# Patient Record
Sex: MALE | Race: WHITE | NOT HISPANIC OR LATINO | Employment: OTHER | URBAN - METROPOLITAN AREA
[De-identification: names, ages, dates, MRNs, and addresses within clinical notes are randomized per-mention and may not be internally consistent; named-entity substitution may affect disease eponyms.]

---

## 2017-01-03 ENCOUNTER — TELEPHONE (OUTPATIENT)
Dept: RADIOLOGY | Facility: HOSPITAL | Age: 62
End: 2017-01-03

## 2017-01-04 ENCOUNTER — HOSPITAL ENCOUNTER (OUTPATIENT)
Dept: RADIOLOGY | Facility: HOSPITAL | Age: 62
Discharge: HOME | End: 2017-01-04
Attending: INTERNAL MEDICINE | Admitting: INTERNAL MEDICINE
Payer: MEDICARE

## 2017-01-04 ENCOUNTER — OFFICE VISIT (OUTPATIENT)
Dept: PULMONOLOGY | Facility: CLINIC | Age: 62
End: 2017-01-04
Payer: MEDICARE

## 2017-01-04 VITALS
WEIGHT: 197.06 LBS | RESPIRATION RATE: 20 BRPM | OXYGEN SATURATION: 98 % | BODY MASS INDEX: 28.21 KG/M2 | SYSTOLIC BLOOD PRESSURE: 108 MMHG | DIASTOLIC BLOOD PRESSURE: 64 MMHG | HEART RATE: 88 BPM | HEIGHT: 70 IN

## 2017-01-04 DIAGNOSIS — R93.89 ABNORMAL CT OF THE CHEST: ICD-10-CM

## 2017-01-04 DIAGNOSIS — J15.0: Primary | ICD-10-CM

## 2017-01-04 DIAGNOSIS — F32.89 OTHER DEPRESSION: ICD-10-CM

## 2017-01-04 PROCEDURE — 71250 CT THORAX DX C-: CPT | Mod: 26,,, | Performed by: RADIOLOGY

## 2017-01-04 PROCEDURE — 99215 OFFICE O/P EST HI 40 MIN: CPT | Mod: PBBFAC,PO | Performed by: INTERNAL MEDICINE

## 2017-01-04 PROCEDURE — 99999 PR PBB SHADOW E&M-EST. PATIENT-LVL V: CPT | Mod: PBBFAC,,, | Performed by: INTERNAL MEDICINE

## 2017-01-04 PROCEDURE — 99215 OFFICE O/P EST HI 40 MIN: CPT | Mod: S$PBB,,, | Performed by: INTERNAL MEDICINE

## 2017-01-04 RX ORDER — PAROXETINE 10 MG/1
10 TABLET, FILM COATED ORAL EVERY MORNING
Qty: 30 TABLET | Refills: 11 | Status: SHIPPED | OUTPATIENT
Start: 2017-01-04 | End: 2017-03-01

## 2017-01-04 RX ORDER — PEN NEEDLE, DIABETIC 31 GX5/16"
NEEDLE, DISPOSABLE MISCELLANEOUS
COMMUNITY
Start: 2016-12-13 | End: 2019-01-14 | Stop reason: ALTCHOICE

## 2017-01-04 RX ORDER — TRAMADOL HYDROCHLORIDE 200 MG/1
TABLET, EXTENDED RELEASE ORAL
COMMUNITY
Start: 2016-12-15 | End: 2017-01-09 | Stop reason: ALTCHOICE

## 2017-01-04 NOTE — PATIENT INSTRUCTIONS
Paroxetine Hydrochloride Oral tablet  What is this medicine?  PAROXETINE (pa ROBI e teen) is used to treat depression. It may also be used to treat anxiety disorders, obsessive compulsive disorder, panic attacks, post traumatic stress, and premenstrual dysphoric disorder (PMDD).  This medicine may be used for other purposes; ask your health care provider or pharmacist if you have questions.  What should I tell my health care provider before I take this medicine?  They need to know if you have any of these conditions:  · bleeding disorders  · glaucoma  · heart disease  · kidney disease  · liver disease  · low levels of sodium in the blood  · kathrin or bipolar disorder  · seizures  · suicidal thoughts, plans, or attempt; a previous suicide attempt by you or a family member  · take MAOIs like Carbex, Eldepryl, Marplan, Nardil, and Parnate  · take medicines that treat or prevent blood clots  · an unusual or allergic reaction to paroxetine, other medicines, foods, dyes, or preservatives  · pregnant or trying to get pregnant  · breast-feeding  How should I use this medicine?  Take this medicine by mouth with a glass of water. Follow the directions on the prescription label. You can take it with or without food. Take your medicine at regular intervals. Do not take your medicine more often than directed. Do not stop taking this medicine suddenly except upon the advice of your doctor. Stopping this medicine too quickly may cause serious side effects or your condition may worsen.  A special MedGuide will be given to you by the pharmacist with each prescription and refill. Be sure to read this information carefully each time.  Talk to your pediatrician regarding the use of this medicine in children. Special care may be needed.  Overdosage: If you think you have taken too much of this medicine contact a poison control center or emergency room at once.  NOTE: This medicine is only for you. Do not share this medicine with  others.  What if I miss a dose?  If you miss a dose, take it as soon as you can. If it is almost time for your next dose, take only that dose. Do not take double or extra doses.  What may interact with this medicine?  Do not take this medicine with any of the following medications:  · linezolid  · MAOIs like Carbex, Eldepryl, Marplan, Nardil, and Parnate  · methylene blue (injected into a vein)  · pimozide  · thioridazine  This medicine may also interact with the following medications:  · alcohol  · aspirin and aspirin-like medicines  · atomoxetine  · certain medicines for depression, anxiety, or psychotic disturbances  · certain medicines for irregular heart beat like propafenone, flecainide, encainide, and quinidine  · certain medicines for migraine headache like almotriptan, eletriptan, frovatriptan, naratriptan, rizatriptan, sumatriptan, zolmitriptan  · cimetidine  · digoxin  · diuretics  · fentanyl  · fosamprenavir/ritonavir  · furazolidone  · isoniazid  · lithium  · medicines that treat or prevent blood clots like warfarin, enoxaparin, and dalteparin  · medicines for sleep  · metoprolol  · NSAIDs, medicines for pain and inflammation, like ibuprofen or naproxen  · phenobarbital  · phenytoin  · procarbazine  · procyclidine  · rasagiline  · supplements like Danny's wort, kava kava, valerian  · tamoxifen  · theophylline  · tramadol  · tryptophan  This list may not describe all possible interactions. Give your health care provider a list of all the medicines, herbs, non-prescription drugs, or dietary supplements you use. Also tell them if you smoke, drink alcohol, or use illegal drugs. Some items may interact with your medicine.  What should I watch for while using this medicine?  Tell your doctor if your symptoms do not get better or if they get worse. Visit your doctor or health care professional for regular checks on your progress. Because it may take several weeks to see the full effects of this medicine, it  is important to continue your treatment as prescribed by your doctor.  Patients and their families should watch out for new or worsening thoughts of suicide or depression. Also watch out for sudden changes in feelings such as feeling anxious, agitated, panicky, irritable, hostile, aggressive, impulsive, severely restless, overly excited and hyperactive, or not being able to sleep. If this happens, especially at the beginning of treatment or after a change in dose, call your health care professional.  You may get drowsy or dizzy. Do not drive, use machinery, or do anything that needs mental alertness until you know how this medicine affects you. Do not stand or sit up quickly, especially if you are an older patient. This reduces the risk of dizzy or fainting spells. Alcohol may interfere with the effect of this medicine. Avoid alcoholic drinks.  Your mouth may get dry. Chewing sugarless gum or sucking hard candy, and drinking plenty of water will help. Contact your doctor if the problem does not go away or is severe.  What side effects may I notice from receiving this medicine?  Side effects that you should report to your doctor or health care professional as soon as possible:  · allergic reactions like skin rash, itching or hives, swelling of the face, lips, or tongue  · black or bloody stools, blood in the urine or vomit  · fast, irregular heartbeat  · hallucination, loss of contact with reality  · painful or prolonged erection (men)  · seizures  · suicidal thoughts or other mood changes  · trouble passing urine or change in the amount of urine  · unusual bleeding or bruising  · unusually weak or tired  · vomiting  Side effects that usually do not require medical attention (report to your doctor or health care professional if they continue or are bothersome):  · change in appetite, weight  · change in sex drive or performance  · constipation or diarrhea  · difficulty sleeping  · drowsy  · headache  · increased  sweating  · muscle pain or weakness  · tremors  This list may not describe all possible side effects. Call your doctor for medical advice about side effects. You may report side effects to FDA at 7-017-HOI-0059.  Where should I keep my medicine?  Keep out of the reach of children.  Store at room temperature between 15 and 30 degrees C (59 and 86 degrees F). Keep container tightly closed. Throw away any unused medicine after the expiration date.  NOTE:This sheet is a summary. It may not cover all possible information. If you have questions about this medicine, talk to your doctor, pharmacist, or health care provider. Copyright© 2016 [employer]

## 2017-01-04 NOTE — MR AVS SNAPSHOT
[facility] - Pulmonary Services  [address] [address]  Phone: [phone]  Fax: [phone]                  Lavelle Ladd   2017 8:40 AM   Office Visit    Description:  Male : 1955   Provider:  Colin Garcia MD   Department:  [facility] - Pulmonary Services           Reason for Visit     Abnormal CT     Pneumonia           Diagnoses this Visit        Comments    Pneumonia of right upper lobe due to Klebsiella pneumoniae    -  Primary     Other depression                To Do List           Future Appointments        Provider Department Dept Phone    2017 8:00 AM RHEUMATOLOGY, INFUSION [facility] [phone]    2017 8:10 AM LAB, TRANSPLANT [facility] [phone]    2017 11:00 AM Radha Pereyra MD [facility] [phone]    2017 9:15 AM LABORATORY, [facility] [phone]    2017 8:45 AM LABORATORY, [facility] [phone]      Goals (5 Years of Data)     None      Follow-Up and Disposition     Return in about 2 years (around 2019) for review of CT scan.       These Medications        Disp Refills Start End    paroxetine (PAXIL) 10 MG tablet 30 tablet 11 2017    Take 1 tablet (10 mg total) by mouth every morning. - Oral    Pharmacy: [id number] - [place] - [address] [facility] Ph #: [phone]         [facility]     [facility] -  Assistance  Registered nurses in the [facility] provide clinical advisement, health education, appointment booking, and other advisory services.  Call for this free service at [phone].             Medications           Message regarding Medications     Verify the changes and/or additions to your medication regime listed below are the same as discussed with your clinician today.  If any of these changes or additions are incorrect, please notify  "your healthcare provider.        START taking these NEW medications        Refills    paroxetine (PAXIL) 10 MG tablet 11    Sig: Take 1 tablet (10 mg total) by mouth every morning.    Class: Normal    Route: Oral           Verify that the below list of medications is an accurate representation of the medications you are currently taking.  If none reported, the list may be blank. If incorrect, please contact your healthcare provider. Carry this list with you in case of emergency.           Current Medications     albuterol-ipratropium 2.5mg-0.5mg/3mL (DUO-NEB) 0.5 mg-3 mg(2.5 mg base)/3 mL nebulizer solution Take 3 mLs by nebulization every 6 (six) hours.    aspirin (ECOTRIN) 81 MG EC tablet Take 1 tablet (81 mg total) by mouth once daily.    atorvastatin (LIPITOR) 10 MG tablet Take 1 tablet (10 mg total) by mouth once daily. While on Harvoni.    atovaquone (MEPRON) 750 mg/5 mL Susp Take 10 mLs (1,500 mg total) by mouth once daily.    BD INSULIN PEN NEEDLE UF SHORT 31 gauge x 5/16" Ndle     blood sugar diagnostic Strp 1 each by Misc.(Non-Drug; Combo Route) route 3 (three) times daily.    blood sugar diagnostic Strp 1 each by Misc.(Non-Drug; Combo Route) route 4 (four) times daily.    blood-glucose meter kit Use as instructed    budesonide-formoterol 160-4.5 mcg (SYMBICORT) 160-4.5 mcg/actuation HFAA Inhale 2 puffs into the lungs every 12 (twelve) hours. Wash out mouth after using    ergocalciferol (ERGOCALCIFEROL) 50,000 unit Cap Take 1 capsule (50,000 Units total) by mouth every 7 days. Take on Mondays    ERTAPENEM SODIUM (ERTAPENEM, INVANZ, 1 G/100 ML NS, READY TO MIX,) Inject 100 mLs (1 g total) into the vein once daily.    famotidine (PEPCID) 20 MG tablet Take 1 tablet (20 mg total) by mouth every evening.    inhalation device (BREATHERITE VALVED MDI CHAMBER) Use as directed for inhalation.    insulin NPH (NOVOLIN N) 100 unit/mL injection Take 20 units subq with breakfast and 8 units at bedtime    insulin regular " "100 unit/mL Inj injection Take 10 units subq with breakfast and 8 units with dinner    k phos di & mono-sod phos mono (K-PHOS-NEUTRAL) 250 mg Tab Take 2 tablets by mouth 3 (three) times daily.    lancets Misc 1 each by Misc.(Non-Drug; Combo Route) route 3 (three) times daily.    lancets Misc 1 each by Misc.(Non-Drug; Combo Route) route 4 (four) times daily.    ledipasvir-sofosbuvir (HARVONI)  mg Tab Take 1 tablet by mouth once daily.    magnesium oxide (MAG-OX) 400 mg tablet Take 1 tablet (400 mg total) by mouth 2 (two) times daily.    metoprolol tartrate (LOPRESSOR) 50 MG tablet Take 0.5 tablets (25 mg total) by mouth 2 (two) times daily.    multivitamin (THERAGRAN) tablet Take 1 tablet by mouth once daily.    nystatin (MYCOSTATIN) 100,000 unit/mL suspension Take 5 mLs (500,000 Units total) by mouth 3 (three) times daily after meals.    olanzapine (ZYPREXA) 5 MG tablet Take 1 tablet (5 mg total) by mouth every evening.    ondansetron (ZOFRAN-ODT) 4 MG TbDL Take 2 tablets (8 mg total) by mouth every 8 (eight) hours as needed (nausea).    pen needle, diabetic (EASY COMFORT PEN NEEDLES) 32 gauge x 5/32" Ndle Inject 1 each into the skin 3 (three) times daily.    pen needle, diabetic 31 gauge x 1/4" Ndle 1 each by Misc.(Non-Drug; Combo Route) route 4 (four) times daily.    predniSONE (DELTASONE) 10 MG tablet 20 mg PO QD 12/3-1/2; 15 mg PO QD 1/3-2/2; 10 mg PO QD 2/3-3/2; 5 mg thereafter    ropinirole (REQUIP) 1 MG tablet Take 1 tablet (1 mg total) by mouth every evening.    sodium bicarbonate 650 MG tablet Take 4 tablets BID  four hours before or after Harvoni    tacrolimus (PROGRAF) 0.5 MG Cap Take 8 capsules (4 mg total) by mouth every 12 (twelve) hours. Z94.0 Kidney Transplant    tramadol (ULTRAM-ER) 200 MG Tb24     oxycodone (ROXICODONE) 15 MG Tab Take 1 tablet (15 mg total) by mouth every 6 (six) hours as needed for Pain.    paroxetine (PAXIL) 10 MG tablet Take 1 tablet (10 mg total) by mouth every " "morning.           Clinical Reference Information           Vital Signs - Last Recorded  Most recent update: 1/4/2017  8:29 AM by Lynne Salvador LPN    BP Pulse Resp Ht Wt SpO2    108/64 88 20 5' 10" (1.778 m) 89.4 kg (197 lb 1.5 oz) 98%    BMI                28.28 kg/m2          Blood Pressure          Most Recent Value    BP  108/64      Allergies as of 1/4/2017     Tylenol [Acetaminophen]      Immunizations Administered on Date of Encounter - 1/4/2017     None      Orders Placed During Today's Visit     Future Labs/Procedures Expected by Expires    CT Chest Without Contrast  1/4/2017 1/4/2018      Maintenance Dialysis History     Start End Type Comments Center    10/30/2013 5/21/2016 Hemo  [facility]            Current Dialysis Center Information     No center information to display.            Transplant Information        Txp Date Organ Coordinator Care Team    5/21/2016 Kidney Ros[facility] Newton RN Referring Physician:  Avel Estrada MD   Current Nephrologist:  Avel Estrada MD   Surgeon:  Ronny Bergeron MD   Assisting Surgeon:  Terrell Navarrete MD   Resident:  Jose David Rowley MD         Instructions    Paroxetine Hydrochloride Oral tablet  What is this medicine?  PAROXETINE (pa ROBI e teen) is used to treat depression. It may also be used to treat anxiety disorders, obsessive compulsive disorder, panic attacks, post traumatic stress, and premenstrual dysphoric disorder (PMDD).  This medicine may be used for other purposes; ask your health care provider or pharmacist if you have questions.  What should I tell my health care provider before I take this medicine?  They need to know if you have any of these conditions:  · bleeding disorders  · glaucoma  · heart disease  · kidney disease  · liver disease  · low levels of sodium in the blood  · kathrin or bipolar disorder  · seizures  · suicidal thoughts, plans, or attempt; a previous suicide attempt by you or a family member  · take MAOIs like " Carbex, Eldepryl, Marplan, Nardil, and Parnate  · take medicines that treat or prevent blood clots  · an unusual or allergic reaction to paroxetine, other medicines, foods, dyes, or preservatives  · pregnant or trying to get pregnant  · breast-feeding  How should I use this medicine?  Take this medicine by mouth with a glass of water. Follow the directions on the prescription label. You can take it with or without food. Take your medicine at regular intervals. Do not take your medicine more often than directed. Do not stop taking this medicine suddenly except upon the advice of your doctor. Stopping this medicine too quickly may cause serious side effects or your condition may worsen.  A special MedGuide will be given to you by the pharmacist with each prescription and refill. Be sure to read this information carefully each time.  Talk to your pediatrician regarding the use of this medicine in children. Special care may be needed.  Overdosage: If you think you have taken too much of this medicine contact a poison control center or emergency room at once.  NOTE: This medicine is only for you. Do not share this medicine with others.  What if I miss a dose?  If you miss a dose, take it as soon as you can. If it is almost time for your next dose, take only that dose. Do not take double or extra doses.  What may interact with this medicine?  Do not take this medicine with any of the following medications:  · linezolid  · MAOIs like Carbex, Eldepryl, Marplan, Nardil, and Parnate  · methylene blue (injected into a vein)  · pimozide  · thioridazine  This medicine may also interact with the following medications:  · alcohol  · aspirin and aspirin-like medicines  · atomoxetine  · certain medicines for depression, anxiety, or psychotic disturbances  · certain medicines for irregular heart beat like propafenone, flecainide, encainide, and quinidine  · certain medicines for migraine headache like almotriptan, eletriptan,  frovatriptan, naratriptan, rizatriptan, sumatriptan, zolmitriptan  · cimetidine  · digoxin  · diuretics  · fentanyl  · fosamprenavir/ritonavir  · furazolidone  · isoniazid  · lithium  · medicines that treat or prevent blood clots like warfarin, enoxaparin, and dalteparin  · medicines for sleep  · metoprolol  · NSAIDs, medicines for pain and inflammation, like ibuprofen or naproxen  · phenobarbital  · phenytoin  · procarbazine  · procyclidine  · rasagiline  · supplements like [place]'s wort, kava kava, valerian  · tamoxifen  · theophylline  · tramadol  · tryptophan  This list may not describe all possible interactions. Give your health care provider a list of all the medicines, herbs, non-prescription drugs, or dietary supplements you use. Also tell them if you smoke, drink alcohol, or use illegal drugs. Some items may interact with your medicine.  What should I watch for while using this medicine?  Tell your doctor if your symptoms do not get better or if they get worse. Visit your doctor or health care professional for regular checks on your progress. Because it may take several weeks to see the full effects of this medicine, it is important to continue your treatment as prescribed by your doctor.  Patients and their families should watch out for new or worsening thoughts of suicide or depression. Also watch out for sudden changes in feelings such as feeling anxious, agitated, panicky, irritable, hostile, aggressive, impulsive, severely restless, overly excited and hyperactive, or not being able to sleep. If this happens, especially at the beginning of treatment or after a change in dose, call your health care professional.  You may get drowsy or dizzy. Do not drive, use machinery, or do anything that needs mental alertness until you know how this medicine affects you. Do not stand or sit up quickly, especially if you are an older patient. This reduces the risk of dizzy or fainting spells. Alcohol may interfere  with the effect of this medicine. Avoid alcoholic drinks.  Your mouth may get dry. Chewing sugarless gum or sucking hard candy, and drinking plenty of water will help. Contact your doctor if the problem does not go away or is severe.  What side effects may I notice from receiving this medicine?  Side effects that you should report to your doctor or health care professional as soon as possible:  · allergic reactions like skin rash, itching or hives, swelling of the face, lips, or tongue  · black or bloody stools, blood in the urine or vomit  · fast, irregular heartbeat  · hallucination, loss of contact with reality  · painful or prolonged erection (men)  · seizures  · suicidal thoughts or other mood changes  · trouble passing urine or change in the amount of urine  · unusual bleeding or bruising  · unusually weak or tired  · vomiting  Side effects that usually do not require medical attention (report to your doctor or health care professional if they continue or are bothersome):  · change in appetite, weight  · change in sex drive or performance  · constipation or diarrhea  · difficulty sleeping  · drowsy  · headache  · increased sweating  · muscle pain or weakness  · tremors  This list may not describe all possible side effects. Call your doctor for medical advice about side effects. You may report side effects to FDA at 7-104-FDA-6880.  Where should I keep my medicine?  Keep out of the reach of children.  Store at room temperature between 15 and 30 degrees C (59 and 86 degrees F). Keep container tightly closed. Throw away any unused medicine after the expiration date.  NOTE:This sheet is a summary. It may not cover all possible information. If you have questions about this medicine, talk to your doctor, pharmacist, or health care provider. Copyright© 2016 [employer]

## 2017-01-04 NOTE — PROGRESS NOTES
Subjective:       Patient ID: Lavelle Ladd is a 61 y.o. male.    Chief Complaint:   He   presents for evaluation and treatment of pneumonia - Klebsiella after being discharged from the hospital  1  month ago. Since discharge symptoms have unchanged course since that time. Patient denies fever or chills. Symptoms are aggravated by activity. Symptoms improve with rest.  Respiratory: positive for dyspnea on exertion and wheezing; Cardiovascular: no chest pain or palpitations.  Patient currently is not on home oxygen therapy..  Depressed - no energy    REVIEW OF D/C Summary  Discharge Summary  Admit Date: 2016   Discharge Date and Time: 2016   Attending Physician: Kvng Cespedes MD    Discharge Physician: Kvng Cespedes MD    Principal Diagnoses: Kidney transplant rejection   Other Secondary Diagnoses:   1. DINORAH (acute kidney injury)    2. Kidney transplant rejection    3. Essential hypertension    4. Hep C w/o coma, chronic    5. Long-term use of immunosuppressant medication    6. Prophylactic immunotherapy    7. S/P kidney transplant    8. Type 2 diabetes mellitus with hyperglycemia, with long-term current use of insulin    9. H/O amputation    10. Type 2 diabetes mellitus with diabetic neuropathy, with long-term current use of insulin    11. Type 2 diabetes mellitus with retinopathy, with long-term current use of insulin, macular edema presence unspecified, unspecified laterality, unspecified retinopathy severity    12. Adverse effect of glucocorticoids and synthetic analogues, sequela       Discharged Condition: good  Indication for Admission: Kidney transplant rejection [T86.11]    Hospital Course:  Mr. Ladd is a 61 y.o. male who is status post  donor kidney transplant on 16 for DM2. His maintenance immunosuppression consists of mycophenolate mofetil and tacrolimus. Pt was admitted to the hospital for treatment of biopsy proven kidney rejection. Kidney biopsy on  suspicious for AMR,  ACR and moderate microcirculatory changes. C4d negative; early transplant glomerulopathy .Of note, patient with h/o previous rejection: 5/31 AVR tx with SM x 8 and 8/15 ACR tx with SM x 3. HLA DSA and non HLA DSA have been negative in past. During this admission, patient received SM pulse (#1 11/30, #2 12/1, #3 12/2) along with PLEX x 3 (#1 12/1, #2 12/1, #3 12/3). DSA was obtained on 12/1 with no detection seen. Creatinine trended down nicely with aforementioned treatments of SM and PLEX. Patient was prepared for discharge after completing PLEX#3. Plan to continue rejection treatments of IVIG in [place], pt with decreased IgG levels on labs from 12/12/16. Planning to discharge pt, but patient developed SOB while inpatient, no hypoxia, no cough; patient never required supplemental oxygen.      Mr. Ladd has a hx of REBEKA lung nodules with absence of cavitation seen on previous CT chest on 9/16. Repeat CT chest obtained on 12/4 as with complaints of SOB. ECHO performed on 12/12/16, Mostly unchanged from previous ECHO 6/16 with an EF 65-70%, final report pending. Chest CT showed cavitary lung lesion in RUL. Pulmonolgy and ID were consulted for help with management. Non invasive fungal markers were ordered via ID: Fungitell (negative), Aspergillus Antigen serum (negative), Urine Histoplasma (negative), Cryptococcal antigen serum (negative) along with quantiferon gold, negative Bronchoscopy/BAL performed via pulmonology on 12/6. Per ID and pulmonology, apical segment of RUL were sent for the following: Fungitell (negative), Aspergillus (negative), AFB stain (neg), AFB culture (pending). Nocardia (negative), KOH prep (negative), fungus culture (pending), RSV PCR (neg), and culture. Respiratory culture was found to be growing kleb pneumo from BAL (which per ID is known for causing nectrotixing PNA).      He was started on IV Cefepime and transitioned to oral moxi for treatment of kleb pneumo per ID with plan for 14-21  days of treatment with follow up CT Chest in 3-6 weeks per pulmonology. However, patient noted to have febrile episode on 12/8 while on IV cefepime. Blood and urine cultures were obtained. Urine culture with no growth. Blood culture from central line noted to be growing Enterobacter cloacae. Therefore, pt transitioned to IV ertapenem for coverage. PICC line placed 12/12/16. Pt will d/c home on IV ertapenem x4 weeks (stop date 1/12/17). He has been educated by [facility] for abx care and management, and will follow up in [facility] 1x weekly for line care.      Patient will need follow up with pulmonology and ID clinic upon completion of treatment/following repeat CT Chest.      Of note, patient with hx of uncontrolled DM. Endocrine was consulted for help with management while inpatient. He was placed on insulin gtt secondary to steroid induced hyperglycemia. He was transitioned off gtt prior to discharge and will be discharged home on NPH 20 units before breakfast, 8 units before bedtime; Regular insulin 10 units before breakfast, 8 units before dinner. Has NPH/R supply, plans to start pump in future with provider in . He will need follow up with Endocrinology as an outpatient.     Also of note, pt with CMV PCR detectable, not quantifiable on 11/16. Repeat CMV PCR obtained 12/1,negative.    Patient feeling well on day of discharge without complaint. He is stable and ready for discharge. He will follow up with labs and clinic appointments via transplant coordinator. Patient verbalized his understanding prior to discharge.    Consults: Endocrinology, KTM, pulmonology, ID     Abnormal CT (rev CT today) and Pneumonia (Hospital follow up, bacterial)    HPI   Abnormal CT of CH[facility]:  Lung Nodule  He presents for evaluation and treatment of a lung nodule - resolved on last CT scan. Now with pneumonia followup The patient reports that the imaging was performed to evaluate symptoms of dyspnea on exertion which have  been present for 2 months and are gradually worsening. Symptoms are exacerbated by exercise and relieved by rest. The patient denies other associated symptoms. He quit smoking approximately 5 years ago. 40 pack years history of smoking The patient has no known exposure to tuberculosis. The patient does not have a history of cancer.   CT scan - 1.5 cm pulmonary nodule has resolved ,recent pneumonia from right upper lobe nearly completely resolved.  Renal transplant on 2016    Past Medical History   Diagnosis Date    DINORAH (acute kidney injury) 2016    Arthritis     Chronic obstructive pulmonary disease 2016    Coronary artery disease involving native coronary artery of native heart without angina pectoris 2016     donor kidney transplant for DM 16      Induction with Thymo x3 and IV solumedrol to total 875mg  Kidney Biopsy  2016: 16 glomeruli, ACR type 1 AVR type 2, significant microcirculatory changes, c4d negative, No DSA, 5 to10% fibrosis. Treated with thymo x8 2016- no rejection      Diastolic heart failure     DM2 (diabetes mellitus, type 2)     Encounter for blood transfusion     ESRD on RRT since 10/2013 10/29/2013     Biopsy proven diabetic nephropathy and lymphoplasmacytic interstitial infiltrate not c/w with AIN (ddx sjogrens or assoc with tamm-horsefall protein extravasation)     GERD (gastroesophageal reflux disease)     Hep C w/o coma, chronic     Hyperlipidemia     Hypertension     Prophylactic immunotherapy     Proteinuria     Reactive airway disease     Renal hypertension     Type 2 diabetes mellitus with diabetic neuropathy, with long-term current use of insulin 2016    Type 2 diabetes mellitus with hyperglycemia, with long-term current use of insulin     Type 2 diabetes mellitus with retinopathy, with long-term current use of insulin 2016    Vitamin B12 deficiency      Past Surgical History   Procedure Laterality Date    Leg  amputation through knee  2011     right LE, started as nail puncture leading to diabetic ulcer    Av fistula placement       left UE    Av bovine graft       Left UE    Cardiac catheterization  02/2015    Kidney transplant       Social History     Social History    Marital status: Single     Spouse name: N/A    Number of children: N/A    Years of education: N/A     Occupational History    Disabled      Disabled     Social History Main Topics    Smoking status: Former Smoker     Packs/day: 1.00     Years: 40.00     Quit date: 1/11/2013    Smokeless tobacco: Never Used    Alcohol use No    Drug use: No    Sexual activity: No     Other Topics Concern    Not on file     Social History Narrative    Lives alone. Retired from construction and various jobs, now retired. Would like to return to work PT to aleviate boredom.     Review of Systems   Constitutional: Positive for fatigue. Negative for fever.   HENT: Negative for postnasal drip, rhinorrhea and congestion.    Respiratory: Positive for shortness of breath, dyspnea on extertion and use of rescue inhaler. Negative for cough, sputum production and Paroxysmal Nocturnal Dyspnea.    Cardiovascular: Negative for chest pain, palpitations and leg swelling.   Skin: Negative for rash.   Gastrointestinal: Negative for nausea and abdominal pain.   Neurological: Negative for dizziness, syncope, weakness and light-headedness.   Hematological: Negative for adenopathy. Does not bruise/bleed easily.   Psychiatric/Behavioral: Positive for sleep disturbance. The patient is not nervous/anxious.        Objective:      Physical Exam   Constitutional: He is oriented to person, place, and time. He appears well-developed and well-nourished.   HENT:   Head: Normocephalic and atraumatic.   Mouth/Throat: Oropharyngeal exudate present.   Eyes: Conjunctivae are normal. Pupils are equal, round, and reactive to light.   Neck: Neck supple. No JVD present. No tracheal deviation  "present. No thyromegaly present.   Cardiovascular: Normal rate, regular rhythm and normal heart sounds.    No murmur heard.  Pulmonary/Chest: Effort normal. He has decreased breath sounds. He has wheezes in the right lower field and the left lower field. He has no rhonchi. He has no rales.   Abdominal: Soft. Bowel sounds are normal.   Musculoskeletal: Normal range of motion. He exhibits no edema or tenderness.   Lymphadenopathy:     He has no cervical adenopathy.   Neurological: He is alert and oriented to person, place, and time.   Skin: Skin is warm and dry.   Nursing note and vitals reviewed.    Personal Diagnostic Review  CT of chest performed on 1/4/2017 without contrast revealed much improved right upper lobe pneumonia - nearly completely resolved.  .[email or web address]  No flowsheet data found.      Assessment:       1. Pneumonia of right upper lobe due to Klebsiella pneumoniae    2. Other depression       - nearly completely resolved     - stop antibiotics on 1/12/2017 as planned  Outpatient Encounter Prescriptions as of 1/4/2017   Medication Sig Dispense Refill    albuterol-ipratropium 2.5mg-0.5mg/3mL (DUO-NEB) 0.5 mg-3 mg(2.5 mg base)/3 mL nebulizer solution Take 3 mLs by nebulization every 6 (six) hours. 120 vial 12    aspirin (ECOTRIN) 81 MG EC tablet Take 1 tablet (81 mg total) by mouth once daily. 30 tablet 0    atorvastatin (LIPITOR) 10 MG tablet Take 1 tablet (10 mg total) by mouth once daily. While on Harvoni. 90 tablet 0    atovaquone (MEPRON) 750 mg/5 mL Susp Take 10 mLs (1,500 mg total) by mouth once daily. 300 mL 7    BD INSULIN PEN NEEDLE UF SHORT 31 gauge x 5/16" Ndle       blood sugar diagnostic Strp 1 each by Misc.(Non-Drug; Combo Route) route 3 (three) times daily. 100 each 11    blood sugar diagnostic Strp 1 each by Misc.(Non-Drug; Combo Route) route 4 (four) times daily. 100 each 5    blood-glucose meter kit Use as instructed 1 each 0    budesonide-formoterol 160-4.5 mcg (SYMBICORT) " 160-4.5 mcg/actuation HFAA Inhale 2 puffs into the lungs every 12 (twelve) hours. Wash out mouth after using 1 Inhaler 12    ergocalciferol (ERGOCALCIFEROL) 50,000 unit Cap Take 1 capsule (50,000 Units total) by mouth every 7 days. Take on Mondays 4 capsule 11    ERTAPENEM SODIUM (ERTAPENEM, INVANZ, 1 G/100 ML NS, READY TO MIX,) Inject 100 mLs (1 g total) into the vein once daily.      famotidine (PEPCID) 20 MG tablet Take 1 tablet (20 mg total) by mouth every evening. 30 tablet 11    inhalation device (BREATHERITE VALVED MDI CHAMBER) Use as directed for inhalation. 1 Device prn    insulin NPH (NOVOLIN N) 100 unit/mL injection Take 20 units subq with breakfast and 8 units at bedtime 1 vial 0    insulin regular 100 unit/mL Inj injection Take 10 units subq with breakfast and 8 units with dinner 10 mL 0    k phos di & mono-sod phos mono (K-PHOS-NEUTRAL) 250 mg Tab Take 2 tablets by mouth 3 (three) times daily. 180 tablet 11    lancets Misc 1 each by Misc.(Non-Drug; Combo Route) route 3 (three) times daily. 100 each 11    lancets Misc 1 each by Misc.(Non-Drug; Combo Route) route 4 (four) times daily. 100 each 5    ledipasvir-sofosbuvir (HARVONI)  mg Tab Take 1 tablet by mouth once daily. 28 tablet 2    magnesium oxide (MAG-OX) 400 mg tablet Take 1 tablet (400 mg total) by mouth 2 (two) times daily. (Patient taking differently: Take 400 mg by mouth once daily. ) 60 tablet 11    metoprolol tartrate (LOPRESSOR) 50 MG tablet Take 0.5 tablets (25 mg total) by mouth 2 (two) times daily. 30 tablet 11    multivitamin (THERAGRAN) tablet Take 1 tablet by mouth once daily. 30 tablet 11    nystatin (MYCOSTATIN) 100,000 unit/mL suspension Take 5 mLs (500,000 Units total) by mouth 3 (three) times daily after meals. 473 mL 0    olanzapine (ZYPREXA) 5 MG tablet Take 1 tablet (5 mg total) by mouth every evening. (Patient taking differently: Take 5 mg by mouth once daily. ) 30 tablet 11    ondansetron (ZOFRAN-ODT) 4  "MG TbDL Take 2 tablets (8 mg total) by mouth every 8 (eight) hours as needed (nausea). 12 tablet 0    pen needle, diabetic (EASY COMFORT PEN NEEDLES) 32 gauge x 5/32" Ndle Inject 1 each into the skin 3 (three) times daily. 100 each 11    pen needle, diabetic 31 gauge x 1/4" Ndle 1 each by Misc.(Non-Drug; Combo Route) route 4 (four) times daily. 100 each 0    predniSONE (DELTASONE) 10 MG tablet 20 mg PO QD 12/3-1/2; 15 mg PO QD 1/3-2/2; 10 mg PO QD 2/3-3/2; 5 mg thereafter 100 tablet 1    ropinirole (REQUIP) 1 MG tablet Take 1 tablet (1 mg total) by mouth every evening. 30 tablet 11    sodium bicarbonate 650 MG tablet Take 4 tablets BID  four hours before or after Harvoni 240 tablet 11    tacrolimus (PROGRAF) 0.5 MG Cap Take 8 capsules (4 mg total) by mouth every 12 (twelve) hours. Z94.0 Kidney Transplant 480 capsule 11    tramadol (ULTRAM-ER) 200 MG Tb24       oxycodone (ROXICODONE) 15 MG Tab Take 1 tablet (15 mg total) by mouth every 6 (six) hours as needed for Pain. 20 tablet 0    paroxetine (PAXIL) 10 MG tablet Take 1 tablet (10 mg total) by mouth every morning. 30 tablet 11    [DISCONTINUED] amlodipine (NORVASC) 10 MG tablet Take 1 tablet (10 mg total) by mouth once daily. 30 tablet 11    [DISCONTINUED] insulin NPH (NOVOLIN N) 100 unit/mL injection Take 20 units subq with breakfast and 12 units at bedtime 1 vial 0    [DISCONTINUED] insulin regular 100 unit/mL Inj injection Take 4 units subq with breakfast and 6 units with dinner 10 mL 0    [DISCONTINUED] oxycodone (OXYCONTIN) 40 mg 12 hr tablet Take 1 tablet (40 mg total) by mouth every 12 (twelve) hours. 60 tablet 0    [DISCONTINUED] oxycodone (ROXICODONE) 10 mg Tab immediate release tablet Take 1 tablet (10 mg total) by mouth every 6 (six) hours as needed. 7 tablet 0    [DISCONTINUED] predniSONE (DELTASONE) 10 MG tablet Take 1/2 tablet (5mg) by mouth once daily      [DISCONTINUED] sodium bicarbonate 650 MG tablet Take 3 tablets (1,950 " mg total) by mouth 2 (two) times daily. (Patient taking differently: Take 650 mg by mouth once daily. ) 300 tablet 11    [DISCONTINUED] sodium bicarbonate 650 MG tablet Take 3 tablets BID  four hours before or after Harvoni 300 tablet 11    [DISCONTINUED] tacrolimus (PROGRAF) 0.5 MG Cap Take 2mg (4 caps) in the AM and 1mg (2 caps) in the PM by mouth. Z94.0 Kidney Transplant 180 capsule 11    [DISCONTINUED] tacrolimus (PROGRAF) 0.5 MG Cap Take 6 capsules (3 mg total) by mouth every 12 (twelve) hours. Z94.0 Kidney Transplant 180 capsule 11    [DISCONTINUED] tramadol (ULTRAM-ER) 200 MG Tb24       [DISCONTINUED] acetaminophen tablet 650 mg       [DISCONTINUED] albuterol-ipratropium 2.5mg-0.5mg/3mL nebulizer solution 3 mL       [DISCONTINUED] aspirin EC tablet 81 mg       [DISCONTINUED] atorvastatin tablet 10 mg       [DISCONTINUED] atovaquone suspension 1,500 mg       [DISCONTINUED] dextrose 50% injection 12.5 g       [DISCONTINUED] dextrose 50% injection 25 g       [DISCONTINUED] ergocalciferol capsule 50,000 Units       [DISCONTINUED] ertapenem (INVANZ) 1 g in sodium chloride 0.9 % 100 mL IVPB (ready to mix system)       [DISCONTINUED] famotidine tablet 20 mg       [DISCONTINUED] fluticasone-vilanterol 100-25 mcg/dose diskus inhaler 1 puff       [DISCONTINUED] glucagon (human recombinant) injection 1 mg       [DISCONTINUED] glucose chewable tablet 16 g       [DISCONTINUED] glucose chewable tablet 24 g       [DISCONTINUED] heparin (porcine) injection 5,000 Units       [DISCONTINUED] insulin aspart pen 1-10 Units       [DISCONTINUED] insulin aspart pen 12 Units       [DISCONTINUED] insulin detemir pen 23 Units       [DISCONTINUED] k phos di & mono-sod phos mono 250 mg tablet 2 tablet       [DISCONTINUED] ledipasvir-sofosbuvir  mg Tab 1 tablet       [DISCONTINUED] magnesium oxide tablet 400 mg       [DISCONTINUED] metoprolol tartrate (LOPRESSOR) tablet 25 mg       [DISCONTINUED]  multivitamin tablet 1 tablet       [DISCONTINUED] nystatin 100,000 unit/mL suspension 500,000 Units       [DISCONTINUED] olanzapine tablet 5 mg       [DISCONTINUED] ondansetron disintegrating tablet 8 mg       [DISCONTINUED] oxycodone immediate release tablet 15 mg       [DISCONTINUED] predniSONE tablet 20 mg       [DISCONTINUED] ropinirole tablet 1 mg       [DISCONTINUED] sodium bicarbonate tablet 1,950 mg       [DISCONTINUED] sodium chloride 0.9% flush 10 mL       [DISCONTINUED] sodium chloride 0.9% flush 10 mL       [DISCONTINUED] sodium chloride 0.9% flush 3 mL       [DISCONTINUED] tacrolimus capsule 3 mg       [DISCONTINUED] tramadol tablet 50 mg        No facility-administered encounter medications on file as of 1/4/2017.      Orders Placed This Encounter   Procedures    CT Chest Without Contrast     Standing Status:   Future     Standing Expiration Date:   1/4/2018     Order Specific Question:   May the Radiologist modify the order per protocol to meet the clinical needs of the patient?     Answer:   Yes     Plan:       Requested Prescriptions     Signed Prescriptions Disp Refills    paroxetine (PAXIL) 10 MG tablet 30 tablet 11     Sig: Take 1 tablet (10 mg total) by mouth every morning.     Pneumonia of right upper lobe due to Klebsiella pneumoniae  -     CT Chest Without Contrast; Future; Expected date: 1/4/17    Other depression  -     paroxetine (PAXIL) 10 MG tablet; Take 1 tablet (10 mg total) by mouth every morning.  Dispense: 30 tablet; Refill: 11      D/C antibiotics on 1/12/2017    [facility]  [phone]         Return in about 2 months (around 3/4/2017) for review of CT scan.   MEDICAL DECISION MAKING: Moderate to high complexity.  Overall, the multiple problems listed are of moderate to high severity that may impact quality of life and activities of daily living. Side effects of medications, treatment plan as well as options and alternatives reviewed and discussed with patient.  There was counseling of patient concerning these issues.    Total time spent in face to face counseling and coordination of care - 60 minutes over 50% of time was used in discussion of prognosis, risks, benefits of treatment, instructions and compliance with regimen . Discussion with other physicians or health care providers occurred.

## 2017-01-05 ENCOUNTER — INFUSION (OUTPATIENT)
Dept: RHEUMATOLOGY | Facility: HOSPITAL | Age: 62
End: 2017-01-05
Attending: INTERNAL MEDICINE
Payer: MEDICARE

## 2017-01-05 ENCOUNTER — TELEPHONE (OUTPATIENT)
Dept: RHEUMATOLOGY | Facility: HOSPITAL | Age: 62
End: 2017-01-05

## 2017-01-05 VITALS
BODY MASS INDEX: 27.65 KG/M2 | DIASTOLIC BLOOD PRESSURE: 80 MMHG | SYSTOLIC BLOOD PRESSURE: 194 MMHG | RESPIRATION RATE: 18 BRPM | HEART RATE: 84 BPM | WEIGHT: 192.69 LBS | TEMPERATURE: 97 F

## 2017-01-05 DIAGNOSIS — T86.11 KIDNEY TRANSPLANT REJECTION: Primary | ICD-10-CM

## 2017-01-05 DIAGNOSIS — Z94.0 S/P KIDNEY TRANSPLANT: ICD-10-CM

## 2017-01-05 PROCEDURE — 96415 CHEMO IV INFUSION ADDL HR: CPT | Mod: PO

## 2017-01-05 PROCEDURE — 63600175 PHARM REV CODE 636 W HCPCS: Mod: PO | Performed by: INTERNAL MEDICINE

## 2017-01-05 PROCEDURE — 96366 THER/PROPH/DIAG IV INF ADDON: CPT | Mod: PO

## 2017-01-05 PROCEDURE — 96365 THER/PROPH/DIAG IV INF INIT: CPT | Mod: PO

## 2017-01-05 PROCEDURE — 96413 CHEMO IV INFUSION 1 HR: CPT | Mod: PO

## 2017-01-05 PROCEDURE — 25000003 PHARM REV CODE 250: Mod: PO | Performed by: INTERNAL MEDICINE

## 2017-01-05 PROCEDURE — 96375 TX/PRO/DX INJ NEW DRUG ADDON: CPT | Mod: PO

## 2017-01-05 RX ORDER — DIPHENHYDRAMINE HYDROCHLORIDE 50 MG/ML
25 INJECTION INTRAMUSCULAR; INTRAVENOUS
Status: DISCONTINUED | OUTPATIENT
Start: 2017-01-05 | End: 2017-01-05 | Stop reason: HOSPADM

## 2017-01-05 RX ORDER — HEPARIN SODIUM (PORCINE) LOCK FLUSH IV SOLN 100 UNIT/ML 100 UNIT/ML
100 SOLUTION INTRAVENOUS
Status: CANCELLED | OUTPATIENT
Start: 2017-01-05

## 2017-01-05 RX ORDER — SODIUM CHLORIDE 0.9 % (FLUSH) 0.9 %
10 SYRINGE (ML) INJECTION
Status: DISCONTINUED | OUTPATIENT
Start: 2017-01-05 | End: 2017-01-05 | Stop reason: HOSPADM

## 2017-01-05 RX ORDER — SODIUM CHLORIDE 0.9 % (FLUSH) 0.9 %
10 SYRINGE (ML) INJECTION
Status: CANCELLED | OUTPATIENT
Start: 2017-01-05

## 2017-01-05 RX ORDER — DIPHENHYDRAMINE HCL 25 MG
25 CAPSULE ORAL
Status: CANCELLED | OUTPATIENT
Start: 2017-01-05

## 2017-01-05 RX ORDER — DIPHENHYDRAMINE HYDROCHLORIDE 50 MG/ML
25 INJECTION INTRAMUSCULAR; INTRAVENOUS
Status: CANCELLED | OUTPATIENT
Start: 2017-01-05

## 2017-01-05 RX ORDER — ACETAMINOPHEN 500 MG
500 TABLET ORAL
Status: CANCELLED | OUTPATIENT
Start: 2017-01-05

## 2017-01-05 RX ADMIN — HUMAN IMMUNOGLOBULIN G 30 G: 20 LIQUID INTRAVENOUS at 09:01

## 2017-01-05 RX ADMIN — SODIUM CHLORIDE, PRESERVATIVE FREE 10 ML: 5 INJECTION INTRAVENOUS at 09:01

## 2017-01-05 RX ADMIN — DIPHENHYDRAMINE HYDROCHLORIDE 25 MG: 50 INJECTION INTRAMUSCULAR; INTRAVENOUS at 09:01

## 2017-01-05 NOTE — MR AVS SNAPSHOT
[facility]  [address] [place] [address]  Phone: [phone]  Fax: [phone]                  Lavelle Ladd   2017 8:00 AM   Infusion    Description:  Male : 1955   Provider:  RHEUMATOLOGY, INFUSION   Department:  [facility]           Diagnoses this Visit        Comments    Kidney transplant rejection    -  Primary     S/P kidney transplant                To Do List           Future Appointments        Provider Department Dept Phone    2017 8:10 AM LAB, TRANSPLANT [facility] [phone]    2017 11:00 AM Radha Pereyra MD [address]- Transplant [phone]    2017 9:15 AM LABORATORY, [facility] [phone]    2017 8:45 AM LABORATORY, [place] [facility] [phone]    3/1/2017 9:00 AM [facility] CT1 LIMIT 500 LBS [facility] - [phone]      Goals (5 Years of Data)     None      [facility]     [facility] -  Assistance  Registered nurses in the [facility] provide clinical advisement, health education, appointment booking, and other advisory services.  Call for this free service at [phone].             Medications           Message regarding Medications     Verify the changes and/or additions to your medication regime listed below are the same as discussed with your clinician today.  If any of these changes or additions are incorrect, please notify your healthcare provider.        These medications were administered today        Dose Freq    diphenhydrAMINE injection 25 mg 25 mg As needed (PRN)    Sig: Inject 0.5 mLs (25 mg total) into the vein as needed (Pre-med for Privigen).    Class: Normal    Route: Intravenous    Non-formulary Exception Code: Specific indication for non-formulary alternative    sodium chloride 0.9% flush 10 mL 10 mL As needed (PRN)    Sig: Inject 10 mLs into the vein as needed  "for Line Care.    Class: Normal    Route: Intravenous    Non-formulary Exception Code: Specific indication for non-formulary alternative    Immune Globulin G (IGG)-PRO-IGA 10 % injection (Privigen) 10 % injection 30 g 30 g Clinic/HOD 1 time    Sig: Inject 300 mLs (30 g total) into the vein one time.    Class: Normal    Route: Intravenous           Verify that the below list of medications is an accurate representation of the medications you are currently taking.  If none reported, the list may be blank. If incorrect, please contact your healthcare provider. Carry this list with you in case of emergency.           Current Medications     albuterol-ipratropium 2.5mg-0.5mg/3mL (DUO-NEB) 0.5 mg-3 mg(2.5 mg base)/3 mL nebulizer solution Take 3 mLs by nebulization every 6 (six) hours.    aspirin (ECOTRIN) 81 MG EC tablet Take 1 tablet (81 mg total) by mouth once daily.    atorvastatin (LIPITOR) 10 MG tablet Take 1 tablet (10 mg total) by mouth once daily. While on Harvoni.    atovaquone (MEPRON) 750 mg/5 mL Susp Take 10 mLs (1,500 mg total) by mouth once daily.    BD INSULIN PEN NEEDLE UF SHORT 31 gauge x 5/16" Ndle     blood sugar diagnostic Strp 1 each by Misc.(Non-Drug; Combo Route) route 3 (three) times daily.    blood sugar diagnostic Strp 1 each by Misc.(Non-Drug; Combo Route) route 4 (four) times daily.    blood-glucose meter kit Use as instructed    budesonide-formoterol 160-4.5 mcg (SYMBICORT) 160-4.5 mcg/actuation HFAA Inhale 2 puffs into the lungs every 12 (twelve) hours. Wash out mouth after using    ergocalciferol (ERGOCALCIFEROL) 50,000 unit Cap Take 1 capsule (50,000 Units total) by mouth every 7 days. Take on Mondays    ERTAPENEM SODIUM (ERTAPENEM, INVANZ, 1 G/100 ML NS, READY TO MIX,) Inject 100 mLs (1 g total) into the vein once daily.    famotidine (PEPCID) 20 MG tablet Take 1 tablet (20 mg total) by mouth every evening.    inhalation device (BREATHERITE VALVED MDI CHAMBER) Use as directed for " "inhalation.    insulin NPH (NOVOLIN N) 100 unit/mL injection Take 20 units subq with breakfast and 8 units at bedtime    insulin regular 100 unit/mL Inj injection Take 10 units subq with breakfast and 8 units with dinner    k phos di & mono-sod phos mono (K-PHOS-NEUTRAL) 250 mg Tab Take 2 tablets by mouth 3 (three) times daily.    lancets Misc 1 each by Misc.(Non-Drug; Combo Route) route 3 (three) times daily.    lancets Misc 1 each by Misc.(Non-Drug; Combo Route) route 4 (four) times daily.    ledipasvir-sofosbuvir (HARVONI)  mg Tab Take 1 tablet by mouth once daily.    magnesium oxide (MAG-OX) 400 mg tablet Take 1 tablet (400 mg total) by mouth 2 (two) times daily.    metoprolol tartrate (LOPRESSOR) 50 MG tablet Take 0.5 tablets (25 mg total) by mouth 2 (two) times daily.    multivitamin (THERAGRAN) tablet Take 1 tablet by mouth once daily.    nystatin (MYCOSTATIN) 100,000 unit/mL suspension Take 5 mLs (500,000 Units total) by mouth 3 (three) times daily after meals.    olanzapine (ZYPREXA) 5 MG tablet Take 1 tablet (5 mg total) by mouth every evening.    ondansetron (ZOFRAN-ODT) 4 MG TbDL Take 2 tablets (8 mg total) by mouth every 8 (eight) hours as needed (nausea).    oxycodone (ROXICODONE) 15 MG Tab Take 1 tablet (15 mg total) by mouth every 6 (six) hours as needed for Pain.    paroxetine (PAXIL) 10 MG tablet Take 1 tablet (10 mg total) by mouth every morning.    pen needle, diabetic (EASY COMFORT PEN NEEDLES) 32 gauge x 5/32" Ndle Inject 1 each into the skin 3 (three) times daily.    pen needle, diabetic 31 gauge x 1/4" Ndle 1 each by Misc.(Non-Drug; Combo Route) route 4 (four) times daily.    predniSONE (DELTASONE) 10 MG tablet 20 mg PO QD 12/3-1/2; 15 mg PO QD 1/3-2/2; 10 mg PO QD 2/3-3/2; 5 mg thereafter    ropinirole (REQUIP) 1 MG tablet Take 1 tablet (1 mg total) by mouth every evening.    sodium bicarbonate 650 MG tablet Take 4 tablets BID  four hours before or after Harvoni    tacrolimus " (PROGRAF) 0.5 MG Cap Take 8 capsules (4 mg total) by mouth every 12 (twelve) hours. Z94.0 Kidney Transplant    tramadol (ULTRAM-ER) 200 MG Tb24            Clinical Reference Information           Vital Signs - Last Recorded  Most recent update: 1/5/2017 10:44 AM by Milvia Moreira RN    BP Pulse Temp Resp Wt BMI    (!) 184/71 88 97.2 °F (36.2 °C) 18 87.4 kg (192 lb 10.9 oz) 27.65 kg/m2      Allergies as of 1/5/2017     Tylenol [Acetaminophen]      Immunizations Administered on Date of Encounter - 1/5/2017     None      Administrations This Visit     diphenhydrAMINE injection 25 mg     Admin Date Action Dose Route Administered By             01/05/2017 Given 25 mg Intravenous Milvia Moreira RN                    Immune Globulin G (IGG)-PRO-IGA 10 % injection (Privigen) 10 % injection 30 g     Admin Date Action Dose Rate Route Administered By          01/05/2017 New Bag 30 g 26 mL/hr Intravenous Milvia Moreira RN             Admin Date Action Dose Rate Route Administered By          01/05/2017 Rate/Dose Change 30 g 52 mL/hr Intravenous Milvia Moreira RN             Admin Date Action Dose Rate Route Administered By          01/05/2017 Rate/Dose Change 30 g 104 mL/hr Intravenous Milvia Moreira RN             Admin Date Action Dose Rate Route Administered By          01/05/2017 Rate/Dose Change 30 g 208 mL/hr Intravenous Milvia Moreira RN                   sodium chloride 0.9% flush 10 mL     Admin Date Action Dose Route Administered By             01/05/2017 Given 10 mL Intravenous Milvia Moreira RN                      Maintenance Dialysis History     Start End Type Comments Center    10/30/2013 5/21/2016 Hemo  [facility]            Current Dialysis Center Information     No center information to display.            Transplant Information        Txp Date Organ Coordinator Care Team    5/21/2016 Kidney Rosita Newton RN Referring Physician:  Avel Estrada MD   Current Nephrologist:   Avel Estrada MD   Surgeon:  Ronny Bergeron MD   Assisting Surgeon:  Terrell Navarrete MD   Resident:  Jose David Rowley MD         Instructions      Immune Globulin Solution for injection  What is this medicine?  IMMUNE GLOBULIN (im MUNE GLOB anna ken) helps to prevent or reduce the severity of certain infections in patients who are at risk. This medicine is collected from the pooled blood of many donors. It is used to treat immune system problems, thrombocytopenia, and Kawasaki syndrome.  This medicine may be used for other purposes; ask your health care provider or pharmacist if you have questions.  What should I tell my health care provider before I take this medicine?  They need to know if you have any of these conditions:  · diabetes  · extremely low or no immune antibodies in the blood  · heart disease  · history of blood clots  · hyperprolinemia  · infection in the blood, sepsis  · kidney disease  · taking medicine that may change kidney function - ask your health care provider about your medicine  · an unusual or allergic reaction to human immune globulin, albumin, maltose, sucrose, polysorbate 80, other medicines, foods, dyes, or preservatives  · pregnant or trying to get pregnant  · breast-feeding  How should I use this medicine?  This medicine is for injection into a muscle or infusion into a vein or skin. It is usually given by a health care professional in a hospital or clinic setting.  In rare cases, some brands of this medicine might be given at home. You will be taught how to give this medicine. Use exactly as directed. Take your medicine at regular intervals. Do not take your medicine more often than directed.  Talk to your pediatrician regarding the use of this medicine in children. Special care may be needed.  Overdosage: If you think you have taken too much of this medicine contact a poison control center or emergency room at once.  NOTE: This medicine is only for you. Do not share this  medicine with others.  What if I miss a dose?  It is important not to miss your dose. Call your doctor or health care professional if you are unable to keep an appointment. If you give yourself the medicine and you miss a dose, take it as soon as you can. If it is almost time for your next dose, take only that dose. Do not take double or extra doses.  What may interact with this medicine?  · aspirin and aspirin-like medicines  · cisplatin  · cyclosporine  · medicines for infection like acyclovir, adefovir, amphotericin B, bacitracin, cidofovir, foscarnet, ganciclovir, gentamicin, pentamidine, vancomycin  · NSAIDS, medicines for pain and inflammation, like ibuprofen or naproxen  · pamidronate  · vaccines  · zoledronic acid  This list may not describe all possible interactions. Give your health care provider a list of all the medicines, herbs, non-prescription drugs, or dietary supplements you use. Also tell them if you smoke, drink alcohol, or use illegal drugs. Some items may interact with your medicine.  What should I watch for while using this medicine?  Your condition will be monitored carefully while you are receiving this medicine.  This medicine is made from pooled blood donations of many different people. It may be possible to pass an infection in this medicine. However, the donors are screened for infections and all products are tested for HIV and hepatitis. The medicine is treated to kill most or all bacteria and viruses. Talk to your doctor about the risks and benefits of this medicine.  Do not have vaccinations for at least 14 days before, or until at least 3 months after receiving this medicine.  What side effects may I notice from receiving this medicine?  Side effects that you should report to your doctor or health care professional as soon as possible:  · allergic reactions like skin rash, itching or hives, swelling of the face, lips, or tongue  · breathing problems  · chest pain or  tightness  · fever, chills  · headache with nausea, vomiting  · neck pain or difficulty moving neck  · pain when moving eyes  · pain, swelling, warmth in the leg  · problems with balance, talking, walking  · sudden weight gain  · swelling of the ankles, feet, hands  · trouble passing urine or change in the amount of urine  Side effects that usually do not require medical attention (report to your doctor or health care professional if they continue or are bothersome):  · dizzy, drowsy  · flushing  · increased sweating  · leg cramps  · muscle aches and pains  · pain at site where injected  This list may not describe all possible side effects. Call your doctor for medical advice about side effects. You may report side effects to FDA at 4-681-RCW-5339.  Where should I keep my medicine?  Keep out of the reach of children.  This drug is usually given in a hospital or clinic and will not be stored at home.  In rare cases, some brands of this medicine may be given at home. If you are using this medicine at home, you will be instructed on how to store this medicine. Throw away any unused medicine after the expiration date on the label.  NOTE: This sheet is a summary. It may not cover all possible information. If you have questions about this medicine, talk to your doctor, pharmacist, or health care provider.  NOTE:This sheet is a summary. It may not cover all possible information. If you have questions about this medicine, talk to your doctor, pharmacist, or health care provider. Copyright© 2016 [employer]      WAYS TO HELP PREVENT INFECTION         WASH YOUR HANDS OFTEN DURING THE DAY, ESPECIALLY BEFORE YOU EAT, AFTER USING THE BATHROOM, AND AFTER TOUCHING ANIMALS     STAY AWAY FROM PEOPLE WHO HAVE ILLNESSES YOU CAN CATCH; SUCH AS COLDS, FLU, CHICKEN POX     TRY TO AVOID CROWDS     STAY AWAY FROM CHILDREN WHO RECENTLY HAVE RECEIVED LIVE VIRUS VACCINES     MAINTAIN GOOD MOUTH CARE     DO NOT SQUEEZE OR SCRATCH  PIMPLES     CLEAN CUTS & SCRAPES RIGHT AWAY AND DAILY UNTIL HEALED WITH WARM WATER, SOAP & AN ANTISEPTIC     AVOID CONTACT WITH LITTER BOXES, BIRD CAGES, & FISH TANKS     AVOID STANDING WATER, IE., BIRD BATHS, FLOWER POTS/VASES, OR HUMIDIFIERS     WEAR GLOVES WHEN GARDENING OR CLEANING UP AFTER OTHERS, ESPECIALLY BABIES & SMALL CHILDREN    DO NOT EAT RAW FISH, SEAFOOD, MEAT, OR EGGS

## 2017-01-05 NOTE — PATIENT INSTRUCTIONS
Immune Globulin Solution for injection  What is this medicine?  IMMUNE GLOBULIN (im MUNE GLOB anna rogers) helps to prevent or reduce the severity of certain infections in patients who are at risk. This medicine is collected from the pooled blood of many donors. It is used to treat immune system problems, thrombocytopenia, and Kawasaki syndrome.  This medicine may be used for other purposes; ask your health care provider or pharmacist if you have questions.  What should I tell my health care provider before I take this medicine?  They need to know if you have any of these conditions:  · diabetes  · extremely low or no immune antibodies in the blood  · heart disease  · history of blood clots  · hyperprolinemia  · infection in the blood, sepsis  · kidney disease  · taking medicine that may change kidney function - ask your health care provider about your medicine  · an unusual or allergic reaction to human immune globulin, albumin, maltose, sucrose, polysorbate 80, other medicines, foods, dyes, or preservatives  · pregnant or trying to get pregnant  · breast-feeding  How should I use this medicine?  This medicine is for injection into a muscle or infusion into a vein or skin. It is usually given by a health care professional in a hospital or clinic setting.  In rare cases, some brands of this medicine might be given at home. You will be taught how to give this medicine. Use exactly as directed. Take your medicine at regular intervals. Do not take your medicine more often than directed.  Talk to your pediatrician regarding the use of this medicine in children. Special care may be needed.  Overdosage: If you think you have taken too much of this medicine contact a poison control center or emergency room at once.  NOTE: This medicine is only for you. Do not share this medicine with others.  What if I miss a dose?  It is important not to miss your dose. Call your doctor or health care professional if you are unable to keep  an appointment. If you give yourself the medicine and you miss a dose, take it as soon as you can. If it is almost time for your next dose, take only that dose. Do not take double or extra doses.  What may interact with this medicine?  · aspirin and aspirin-like medicines  · cisplatin  · cyclosporine  · medicines for infection like acyclovir, adefovir, amphotericin B, bacitracin, cidofovir, foscarnet, ganciclovir, gentamicin, pentamidine, vancomycin  · NSAIDS, medicines for pain and inflammation, like ibuprofen or naproxen  · pamidronate  · vaccines  · zoledronic acid  This list may not describe all possible interactions. Give your health care provider a list of all the medicines, herbs, non-prescription drugs, or dietary supplements you use. Also tell them if you smoke, drink alcohol, or use illegal drugs. Some items may interact with your medicine.  What should I watch for while using this medicine?  Your condition will be monitored carefully while you are receiving this medicine.  This medicine is made from pooled blood donations of many different people. It may be possible to pass an infection in this medicine. However, the donors are screened for infections and all products are tested for HIV and hepatitis. The medicine is treated to kill most or all bacteria and viruses. Talk to your doctor about the risks and benefits of this medicine.  Do not have vaccinations for at least 14 days before, or until at least 3 months after receiving this medicine.  What side effects may I notice from receiving this medicine?  Side effects that you should report to your doctor or health care professional as soon as possible:  · allergic reactions like skin rash, itching or hives, swelling of the face, lips, or tongue  · breathing problems  · chest pain or tightness  · fever, chills  · headache with nausea, vomiting  · neck pain or difficulty moving neck  · pain when moving eyes  · pain, swelling, warmth in the leg  · problems  with balance, talking, walking  · sudden weight gain  · swelling of the ankles, feet, hands  · trouble passing urine or change in the amount of urine  Side effects that usually do not require medical attention (report to your doctor or health care professional if they continue or are bothersome):  · dizzy, drowsy  · flushing  · increased sweating  · leg cramps  · muscle aches and pains  · pain at site where injected  This list may not describe all possible side effects. Call your doctor for medical advice about side effects. You may report side effects to FDA at 5-373-HEO-4155.  Where should I keep my medicine?  Keep out of the reach of children.  This drug is usually given in a hospital or clinic and will not be stored at home.  In rare cases, some brands of this medicine may be given at home. If you are using this medicine at home, you will be instructed on how to store this medicine. Throw away any unused medicine after the expiration date on the label.  NOTE: This sheet is a summary. It may not cover all possible information. If you have questions about this medicine, talk to your doctor, pharmacist, or health care provider.  NOTE:This sheet is a summary. It may not cover all possible information. If you have questions about this medicine, talk to your doctor, pharmacist, or health care provider. Copyright© 2016 [employer]      WAYS TO HELP PREVENT INFECTION         WASH YOUR HANDS OFTEN DURING THE DAY, ESPECIALLY BEFORE YOU EAT, AFTER USING THE BATHROOM, AND AFTER TOUCHING ANIMALS     STAY AWAY FROM PEOPLE WHO HAVE ILLNESSES YOU CAN CATCH; SUCH AS COLDS, FLU, CHICKEN POX     TRY TO AVOID CROWDS     STAY AWAY FROM CHILDREN WHO RECENTLY HAVE RECEIVED LIVE VIRUS VACCINES     MAINTAIN GOOD MOUTH CARE     DO NOT SQUEEZE OR SCRATCH PIMPLES     CLEAN CUTS & SCRAPES RIGHT AWAY AND DAILY UNTIL HEALED WITH WARM WATER, SOAP & AN ANTISEPTIC     AVOID CONTACT WITH LITTER BOXES, BIRD CAGES, & FISH  TANKS     AVOID STANDING WATER, IE., BIRD BATHS, FLOWER POTS/VASES, OR HUMIDIFIERS     WEAR GLOVES WHEN GARDENING OR CLEANING UP AFTER OTHERS, ESPECIALLY BABIES & SMALL CHILDREN    DO NOT EAT RAW FISH, SEAFOOD, MEAT, OR EGGS

## 2017-01-05 NOTE — PROGRESS NOTES
Infusion# 1st here (patient states he has had 2 in hospital)  S/S infection noted or voiced? denies  Recent labs? yes    Premeds? Benadryl 25 mg IVP over 2 minutes    IVIG(Privigen) 30 Grams administered IV at a 0.5mgkg/minl x 30 minutes, then the dose doubled every 30 minutes to max dose of 5mg/kg/min per orders; see MAR and vitals for more  details. Tolerated well without adverse events, discharged and ambulatory out of clinic.

## 2017-01-05 NOTE — TELEPHONE ENCOUNTER
Spoke to Pelon, transplant RN, to verify product; reports she spoke to transplant pharmacist, Viviana and Privigen is the product of choice.

## 2017-01-06 RX ORDER — TACROLIMUS 1 MG/1
5 CAPSULE ORAL EVERY 12 HOURS
Qty: 300 CAPSULE | Refills: 11 | Status: SHIPPED | OUTPATIENT
Start: 2017-01-06 | End: 2017-01-11 | Stop reason: SDUPTHER

## 2017-01-09 ENCOUNTER — HOSPITAL ENCOUNTER (OUTPATIENT)
Dept: RADIOLOGY | Facility: HOSPITAL | Age: 62
Discharge: HOME | End: 2017-01-09
Attending: INTERNAL MEDICINE
Payer: MEDICARE

## 2017-01-09 ENCOUNTER — OFFICE VISIT (OUTPATIENT)
Dept: TRANSPLANT | Facility: CLINIC | Age: 62
End: 2017-01-09
Payer: MEDICARE

## 2017-01-09 ENCOUNTER — TELEPHONE (OUTPATIENT)
Dept: DIABETES | Facility: CLINIC | Age: 62
End: 2017-01-09

## 2017-01-09 VITALS
DIASTOLIC BLOOD PRESSURE: 77 MMHG | WEIGHT: 197.06 LBS | RESPIRATION RATE: 18 BRPM | BODY MASS INDEX: 28.21 KG/M2 | SYSTOLIC BLOOD PRESSURE: 114 MMHG | HEIGHT: 70 IN | TEMPERATURE: 97 F | OXYGEN SATURATION: 98 % | HEART RATE: 90 BPM

## 2017-01-09 DIAGNOSIS — Z94.0 S/P KIDNEY TRANSPLANT: Chronic | ICD-10-CM

## 2017-01-09 DIAGNOSIS — N18.30 CKD (CHRONIC KIDNEY DISEASE) STAGE 3, GFR 30-59 ML/MIN: ICD-10-CM

## 2017-01-09 DIAGNOSIS — E11.65 TYPE 2 DIABETES MELLITUS WITH HYPERGLYCEMIA, WITH LONG-TERM CURRENT USE OF INSULIN: ICD-10-CM

## 2017-01-09 DIAGNOSIS — Z79.4 TYPE 2 DIABETES MELLITUS WITH HYPERGLYCEMIA, WITH LONG-TERM CURRENT USE OF INSULIN: ICD-10-CM

## 2017-01-09 DIAGNOSIS — J44.0 CHRONIC OBSTRUCTIVE PULMONARY DISEASE WITH ACUTE LOWER RESPIRATORY INFECTION: ICD-10-CM

## 2017-01-09 DIAGNOSIS — I10 ESSENTIAL HYPERTENSION: ICD-10-CM

## 2017-01-09 DIAGNOSIS — B18.2 HEP C W/O COMA, CHRONIC: Chronic | ICD-10-CM

## 2017-01-09 DIAGNOSIS — J11.00 PNEUMONIA AND INFLUENZA: ICD-10-CM

## 2017-01-09 DIAGNOSIS — Z79.60 LONG-TERM USE OF IMMUNOSUPPRESSANT MEDICATION: ICD-10-CM

## 2017-01-09 DIAGNOSIS — Z16.12 ESBL (EXTENDED SPECTRUM BETA-LACTAMASE) PRODUCING BACTERIA INFECTION: ICD-10-CM

## 2017-01-09 DIAGNOSIS — A49.9 ESBL (EXTENDED SPECTRUM BETA-LACTAMASE) PRODUCING BACTERIA INFECTION: ICD-10-CM

## 2017-01-09 DIAGNOSIS — I13.10 MALIGNANT HTN WITH HEART DISEASE, W/O CHF, WITH CHRONIC KIDNEY DISEASE: ICD-10-CM

## 2017-01-09 DIAGNOSIS — I12.9 RENAL HYPERTENSION, STAGE 1-4 OR UNSPECIFIED CHRONIC KIDNEY DISEASE: Chronic | ICD-10-CM

## 2017-01-09 DIAGNOSIS — Z94.0 S/P KIDNEY TRANSPLANT: Primary | Chronic | ICD-10-CM

## 2017-01-09 PROCEDURE — 99215 OFFICE O/P EST HI 40 MIN: CPT | Mod: S$PBB,,, | Performed by: INTERNAL MEDICINE

## 2017-01-09 PROCEDURE — 99999 PR PBB SHADOW E&M-EST. PATIENT-LVL III: CPT | Mod: PBBFAC,,, | Performed by: INTERNAL MEDICINE

## 2017-01-09 PROCEDURE — 71020 XR CHEST PA AND LATERAL: CPT | Mod: 26,,, | Performed by: RADIOLOGY

## 2017-01-09 PROCEDURE — 71020 XR CHEST PA AND LATERAL: CPT | Mod: TC

## 2017-01-09 NOTE — TELEPHONE ENCOUNTER
----- Message from Dana Morillo RD, CDE sent at 1/9/2017  2:10 PM CST -----  Please coord visit with Flako for pump eval/orders this or next week if possible.    I'd like to see him back in Feb.   Thanks so much! Dana    ----- Message -----     From: Bryce aBdillo MD     Sent: 1/9/2017   2:00 PM       To: Dana Morillo RD, CDE, #    Hi    I saw in our transplant clinic Mr Ladd. He ius a very complex patient with a recent treatment for kidney rejection and Pneumonia. Unfortunately he has not been able to make it to see you for the insulin pump  Just wanted to check if you have an open spot to see this gentleman soon. His A1c in september 2016 was 11%    Thanks for your help    Bryce Badillo MD  Transplant Nephrology

## 2017-01-09 NOTE — LETTER
January 9, 2017        Avel Estrada  [address]  [address]  [place] [address]  Phone: [phone]  Fax: [phone]             [facility]  [address]  [facility] [address]  Phone: [phone]   Patient: Lavelle Ladd   MR Number: [id number]   YOB: 1955   Date of Visit: 1/9/2017       Dear Dr. Avel Estrada    Thank you for referring Lavelle Ladd to me for evaluation. Attached you will find relevant portions of my assessment and plan of care.    If you have questions, please do not hesitate to call me. I look forward to following Lavelle Ladd along with you.    Sincerely,    Bryce Badillo MD    Enclosure    If you would like to receive this communication electronically, please contact [email or web address] or [phone] to request [employer] access.    [employer] is a tool which provides read-only access to select patient information with whom you have a relationship. Its easy to use and provides real time access to review your patients record including encounter summaries, notes, results, and demographic information.    If you feel you have received this communication in error or would no longer like to receive these types of communications, please e-mail [email or web address]

## 2017-01-09 NOTE — PROGRESS NOTES
"   Kidney Post-Transplant Assessment    Referring Physician: Avel Estrada  Current Nephrologist: Avel Estrada    ORGAN: RIGHT KIDNEY  Donor Type:  - brain death  PHS Increased Risk: yes  Cold Ischemia: 1088 mins  Induction Medications: thymoglobulin    Subjective:     CC:  Reassessment of renal allograft function and management of chronic immunosuppression.    HPI:  Mr. Ladd is a 61 y.o. year old White male who received a  - brain death kidney transplant on 16.  He has CKD stage 3 - GFR 30-59 and his baseline creatinine is between 1.3 to 1.8. He takes tacrolimus for maintenance immunosuppression. He denies any recent hospitalizations or ER visits since his previous clinic visit.    Patient still refers lack of energy    He denies today dyspnea or chest pain    Great appetite    Patient still living alone    Patient drives himself to [facility] in [place]    No fever, No cough    Patient received last does of IVIG due to hypogammaglobulinemia and h/o AM rejection    Refers some nasal congestion and sneezing in the last 3 days, no fever    Patient was seen by Dr Garcia (pulmonary medicine), scheduled for a follow up CT of the Chest 2017    So far cultures negative for Fungal infection or TB    Only positive sputum culture for Klebsiella     Discharge summary:    "Mr. Ladd is a 61 y.o. male who is status post  donor kidney transplant on 16 for DM2. His maintenance immunosuppression consists of mycophenolate mofetil and tacrolimus. Pt was admitted to the hospital for treatment of biopsy proven kidney rejection. Kidney biopsy on  suspicious for AMR, ACR and moderate microcirculatory changes. 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 HLA DSA and non HLA DSA have been negative in past. During this admission, patient received SM pulse (#1 , #2 , #3 " 12/2) along with PLEX x 3 (#1 12/1, #2 12/1, #3 12/3). DSA was obtained on 12/1 with no detection seen. Creatinine trended down nicely with aforementioned treatments of SM and PLEX. Patient was prepared for discharge after completing PLEX#3. Plan to continue rejection treatments of IVIG in [place],  with decreased IgG levels on labs from 12/12/16. Planning to discharge pt, but patient developed SOB while inpatient, no hypoxia, no cough; patient never required supplemental oxygen.      Mr. Ladd has a hx of REBEKA lung nodules with absence of cavitation seen on previous CT chest on 9/16. Repeat CT chest obtained on 12/4 as with complaints of SOB. ECHO performed on 12/12/16, Mostly unchanged from previous ECHO 6/16 with an EF 65-70%, final report pending. Chest CT showed cavitary lung lesion in RUL. Pulmonolgy and ID were consulted for help with management. Non invasive fungal markers were ordered via ID: Fungitell (negative), Aspergillus Antigen serum (negative), Urine Histoplasma (negative), Cryptococcal antigen serum (negative) along with quantiferon gold, negative Bronchoscopy/BAL performed via pulmonology on 12/6. Per ID and pulmonology, apical segment of RUL were sent for the following: Fungitell (negative), Aspergillus (negative), AFB stain (neg), AFB culture (pending). Nocardia (negative), KOH prep (negative), fungus culture (pending), RSV PCR (neg), and culture. Respiratory culture was found to be growing kleb pneumo from BAL (which per ID is known for causing nectrotixing PNA).      He was started on IV Cefepime and transitioned to oral moxi for treatment of kleb pneumo per ID with plan for 14-21 days of treatment with follow up CT Chest in 3-6 weeks per pulmonology. However, patient noted to have febrile episode on 12/8 while on IV cefepime. Blood and urine cultures were obtained. Urine culture with no growth. Blood culture from central line noted to be growing Enterobacter cloacae. Therefore, pt  "transitioned to IV ertapenem for coverage. PICC line placed 12/12/16. Pt will d/c home on IV ertapenem x4 weeks (stop date 1/12/17). He has been educated by [facility] for abx care and management, and will follow up in [facility] 1x weekly for line care.      Patient will need follow up with pulmonology and ID clinic upon completion of treatment/following repeat CT Chest.      Of note, patient with hx of uncontrolled DM. Endocrine was consulted for help with management while inpatient. He was placed on insulin gtt secondary to steroid induced hyperglycemia. He was transitioned off gtt prior to discharge and will be discharged home on NPH 20 units before breakfast, 8 units before bedtime; Regular insulin 10 units before breakfast, 8 units before dinner. Has NPH/R supply, plans to start pump in future with provider in . He will need follow up with Endocrinology as an outpatient.       Also of note, pt with CMV PCR detectable, not quantifiable on 11/16. Repeat CMV PCR obtained 12/1,negative.        Review of Systems     As above    Fatigue  No chest pain fever or SOB    Skin: no skin rash  CNS; no headaches, blurred vision, seizure, or syncope  ENT: No JVD,  Adenopathies,  nasal congestion. No oral lesions  Cardiac: No chest pain, dyspnea, claudication, edema or palpitations  Respiratory: No SOB, cough, hemoptysis   Gastro-intestinal: No diarrhea, constipation, abdominal pain, nausea, vomit. No ascitis  Genitourinary: no hematuria, dysuria, frequency, frequency  Musculoskeletal: joint pain, arthritis or vasculitic changes  Psych: alert awake, oriented, No cranial nerves deficit.      Objective:     Blood pressure 114/77, pulse 90, temperature 97.4 °F (36.3 °C), temperature source Oral, resp. rate 18, height 5' 10" (1.778 m), weight 89.4 kg (197 lb 1.5 oz), SpO2 98 %.body mass index is 28.28 kg/(m^2).    Physical Exam     Head: normocephalic  Neck: No JVD, cervical axillary, or femoral adenopathies  Heart: no " murmurs, Normal s1 and s2, No gallops, no rubs, No murmurs  Lungs; CTA, good respiratory effort, no crackles  Abdomen: soft, non tender, no splenomegaly or hepatomegaly, no massess, no bruits  Extremities: No edema, skin rash, joint pain  SNC: awake, alert oriented. Cranial nerves are intact, no focalized, sensitivity and strength preserved      Labs:  Lab Results   Component Value Date    WBC 4.33 2017    HGB 14.6 2017    HCT 42.3 2017     2017     2017    K 4.7 2017    K 4.7 2017     2017     2017    CO2 25 2017    CO2 25 2017    BUN 28 (H) 2017    BUN 28 (H) 2017    CREATININE 1.8 (H) 2017    CREATININE 1.8 (H) 2017    EGFRNONAA 39.2 (A) 2017    EGFRNONAA 39.2 (A) 2017    CALCIUM 9.5 2017    CALCIUM 9.5 2017    PHOS 3.1 2017    MG 2.1 2017    ALBUMIN 3.1 (L) 2017    ALBUMIN 3.1 (L) 2017    AST 66 (H) 2017     (H) 2017    UTPCR 0.31 (H) 10/31/2016    .0 (H) 2016    TACROLIMUS 5.2 2017       No results found for: EXTANC, EXTWBC, EXTSEGS, EXTPLATELETS, EXTHEMOGLOBI, EXTHEMATOCRI, EXTCREATININ, EXTSODIUM, EXTPOTASSIUM, EXTBUN, EXTCO2, EXTCALCIUM, EXTPHOSPHORU, EXTGLUCOSE, EXTALBUMIN, EXTAST, EXTALT, EXTBILITOTAL, EXTLIPASE, EXTAMYLASE    No results found for: EXTCYCLOSLVL, EXTSIROLIMUS, EXTTACROLVL, EXTPROTCRE, EXTPTHINTACT, EXTPROTEINUA, EXTWBCUA, EXTRBCUA    Labs were reviewed with the patient.    Assessment:     1.  donor kidney transplant for DM 16    2. Renal hypertension, stage 1-4 or unspecified chronic kidney disease    3. Hep C w/o coma, chronic    4. Malignant HTN with heart disease, w/o CHF, with chronic kidney disease    5. Essential hypertension    6. Long-term use of immunosuppressant medication    7. Type 2 diabetes mellitus with hyperglycemia, with long-term current use of insulin    8.  Chronic obstructive pulmonary disease with acute lower respiratory infection    9. CKD (chronic kidney disease) stage 3, GFR 30-59 ml/min    10. ESBL (extended spectrum beta-lactamase) producing bacteria infection        Plan:       At the present time will continue with IV antibiotics as per ID  Will order a Chest x Ry today. X Ry was reviewed and seems unremarkable to me. No evidence of acute or worsening Pneumonia  Same dose of prograf  I sent a message to Endocrine to address appointment for Insulin pump due to poorly controlled diabetes. The NP will see the patient ASAP  Will not do a kidney biopsy at the present time. Patient is still ocnvalexcense from severe Pneumonia requiring prolonged treatment with IV antibiotics  Will monitor labs and prograf level closely for now. Dose was adjusted last week. Level today not doene. P[atient did not show for labs early and he had non fasting labs         1. CKD stage: ckd stage 3 with elevated creatinine, will monitor prograf level for now, hydrate, continue treatment with IV abs, No kidney biopsy at the present time Education provided in appropriate fluid intake, potassium intake. Continue with oral hydration    2. Immunosuppression: No change, once patient is recovered from Pneumonia can restart some Cellcept low dose at 500 bid Will closely monitor for toxicities, education provided about adherence to medicines and need to communicate any side effct to the transplant nurse or physician    3. Allograft Function: elevated creatinien, supportive care for now, he received IVIG, will monitor labs next week, including prograf level   Lab Results   Component Value Date    CREATININE 1.8 (H) 01/09/2017    CREATININE 1.8 (H) 01/09/2017       4. Hypertension management:  Well controlled, No immediate action Continue with home blood pressure monitoring, low salt and healthy life discussed with the patient    5. Metabolic Bone Disease/Secondary Hyperparathyroidism: calcium and  phosphorus as per our center protocol. Will monitor PTH, Vit D level, calcium      Lab Results   Component Value Date    .0 (H) 09/19/2016    CALCIUM 9.5 01/09/2017    CALCIUM 9.5 01/09/2017    CAION 1.10 05/30/2016    PHOS 3.1 01/09/2017       6. Electrolytes: reviewed with the patient, essentially within the normal range no need for acute changes today, will monitor as per our center guidelines     Lab Results   Component Value Date     01/09/2017     01/09/2017    K 4.7 01/09/2017    K 4.7 01/09/2017     01/09/2017     01/09/2017    CO2 25 01/09/2017    CO2 25 01/09/2017       7. Anemia: will continue monitoring as per our center guidelines. No indication for acute intervention today     Lab Results   Component Value Date    WBC 4.33 01/09/2017    HGB 14.6 01/09/2017    HCT 42.3 01/09/2017    MCV 95 01/09/2017     (L) 01/09/2017       8.Proteinuria: will continue with pr/cr ratio as per our center guidelines  Lab Results   Component Value Date    PROTEINURINE 9 10/31/2016    CREATRANDUR 29.0 10/31/2016    UTPCR 0.31 (H) 10/31/2016        9. BK virus infection screening: will continue with urine or blood PCR as per our guidelines to prevent BK virus viremia and allograft dysfunction  Lab Results   Component Value Date    BKVIRUSPCRQB <250 10/31/2016         10. Weight education: provided during the clinic visit   Body mass index is 28.28 kg/(m^2).       11.Patient safety education regarding immunosuppression including prophylaxis posttransplant for CMV, PCP : Education provided about vaccination and prevention of infections    12.  Cytopenias: no significant cytopenias will monitor as per our guidelines. Medicine list reviewed including potential causes of drug-induced cytopenias     Lab Results   Component Value Date    WBC 4.33 01/09/2017    HGB 14.6 01/09/2017    HCT 42.3 01/09/2017    MCV 95 01/09/2017     (L) 01/09/2017       I spoke with the patient for 30  minutes. More than half dedicated to counseling and education. All questions answered                Follow-up:   Clinic: return to transplant clinic weekly for the first month after transplant; every 2 weeks during months 2-3; then at 6-, 9-, 12-, 18-, 24-, and 36- months post-transplant to reassess for complications from immunosuppression toxicity and monitor for rejection.  Annually thereafter.    Labs: since patient remains at high risk for rejection and drug-related complications that warrant close monitoring, labs will be ordered as follows: continue twice weekly CBC, renal panel, and drug level for first month; then same labs once weekly through 3rd month post-transplant.  Urine for UA and protein/creatinine ratio monthly.  Urine BK - PCR at 1-, 3-, 6-, 9-, 12-, 18-, 24-, and 36- months post-transplant.  Hepatic panel at 1-, 2-, 3-, 6-, 9-, 12-, 18-, 24-, and 36- months post-transplant.    Bryce Badillo MD       Education:   Material provided to the patient.  Patient reminded to call with any health changes since these can be early signs of significant complications.  Also, I advised the patient to be sure any new medications or changes of old medications are discussed with either a pharmacist or physician knowledgeable with transplant to avoid rejection/drug toxicity related to significant drug interactions.    UNOS Patient Status  Functional Status: 70% - Cares for self: unable to carry on normal activity or active work  Physical Capacity: Limited Mobility

## 2017-01-10 ENCOUNTER — TELEPHONE (OUTPATIENT)
Dept: HEPATOLOGY | Facility: CLINIC | Age: 62
End: 2017-01-10

## 2017-01-10 DIAGNOSIS — B18.2 CHRONIC HEPATITIS C WITHOUT HEPATIC COMA: Primary | ICD-10-CM

## 2017-01-10 NOTE — TELEPHONE ENCOUNTER
HCV LAB REVIEW  Week 12 of [facility],  END OF TREATMENT  Radha 1b, tx naive, Stage 1 fibrosis  Post kidney transplant    Pertinent labs:  1/9/17  BMP stable overall - monitored by Kidney Transplant, Cr 1.8, GFR 39  LFT - elevated transaminases, presumed due to antibiotics for pneumonia  HCV neg      pls call pt:  Liver numbers are up but I suspect this is from his antibiotics. We will monitor  HCV is negative  Patient should be finishing HCV treatment any day now.   There is a small chance the virus can return. We will monitor blood for this.      Next labs due - pls schedule  - CMP, HCV RNA - SVR4 - 2/6  - CMP, HCV RNA - SVR12 - 4/3

## 2017-01-11 ENCOUNTER — DOCUMENTATION ONLY (OUTPATIENT)
Dept: TRANSPLANT | Facility: CLINIC | Age: 62
End: 2017-01-11

## 2017-01-11 RX ORDER — TACROLIMUS 1 MG/1
CAPSULE ORAL
Qty: 300 CAPSULE | Refills: 11 | Status: SHIPPED | OUTPATIENT
Start: 2017-01-11 | End: 2017-01-19 | Stop reason: SDUPTHER

## 2017-01-11 RX ORDER — MYCOPHENOLATE MOFETIL 250 MG/1
500 CAPSULE ORAL 2 TIMES DAILY
Qty: 120 CAPSULE | Refills: 11 | Status: SHIPPED | OUTPATIENT
Start: 2017-01-11 | End: 2018-01-03 | Stop reason: SDUPTHER

## 2017-01-11 NOTE — PROGRESS NOTES
Call placed to patient , Patient missed repeat labs on yesterday which were rescheduled for this AM. Patient did not complete lab draw today. Patient last Prograf level 9.7, but was not a true trough level. Patient has not adjusted and reports currently taking Prograf 4/4. Patient will continue this dose today and repeat labs in AM.

## 2017-01-12 ENCOUNTER — DOCUMENTATION ONLY (OUTPATIENT)
Dept: TRANSPLANT | Facility: CLINIC | Age: 62
End: 2017-01-12

## 2017-01-12 ENCOUNTER — LAB VISIT (OUTPATIENT)
Dept: LAB | Facility: HOSPITAL | Age: 62
End: 2017-01-12
Attending: INTERNAL MEDICINE
Payer: MEDICARE

## 2017-01-12 DIAGNOSIS — Z94.0 KIDNEY REPLACED BY TRANSPLANT: ICD-10-CM

## 2017-01-12 DIAGNOSIS — T86.10 COMPLICATION OF TRANSPLANTED KIDNEY, UNSPECIFIED COMPLICATION: Primary | ICD-10-CM

## 2017-01-12 NOTE — PROGRESS NOTES
IV ABX completed today. Patient will be seen for follow up with Dr. STEPHANIE Love, ID on Monday 1/16/17. If no further treatment is required per ID, patient will have PICC line removed by [facility] on 1/16/17. Patient called, informed of plan. States understanding. [facility] also informed.

## 2017-01-12 NOTE — TELEPHONE ENCOUNTER
MA called pt to relay provider message. Call successful. Spoke with pt. Provider message relayed. Pt verbalized understanding. Lab appts scheduled for 2/6/17

## 2017-01-13 ENCOUNTER — TELEPHONE (OUTPATIENT)
Dept: TRANSPLANT | Facility: CLINIC | Age: 62
End: 2017-01-13

## 2017-01-16 ENCOUNTER — OFFICE VISIT (OUTPATIENT)
Dept: INFECTIOUS DISEASES | Facility: CLINIC | Age: 62
End: 2017-01-16
Payer: MEDICARE

## 2017-01-16 ENCOUNTER — DOCUMENTATION ONLY (OUTPATIENT)
Dept: TRANSPLANT | Facility: CLINIC | Age: 62
End: 2017-01-16

## 2017-01-16 ENCOUNTER — TELEPHONE (OUTPATIENT)
Dept: INFECTIOUS DISEASES | Facility: CLINIC | Age: 62
End: 2017-01-16

## 2017-01-16 ENCOUNTER — LAB VISIT (OUTPATIENT)
Dept: LAB | Facility: HOSPITAL | Age: 62
End: 2017-01-16
Attending: INTERNAL MEDICINE
Payer: MEDICARE

## 2017-01-16 VITALS
TEMPERATURE: 98 F | WEIGHT: 198.44 LBS | SYSTOLIC BLOOD PRESSURE: 164 MMHG | BODY MASS INDEX: 28.41 KG/M2 | DIASTOLIC BLOOD PRESSURE: 70 MMHG | HEIGHT: 70 IN | HEART RATE: 84 BPM

## 2017-01-16 DIAGNOSIS — J98.4 CAVITARY LESION OF LUNG: Primary | ICD-10-CM

## 2017-01-16 DIAGNOSIS — Z94.0 S/P KIDNEY TRANSPLANT: Chronic | ICD-10-CM

## 2017-01-16 DIAGNOSIS — Z94.0 KIDNEY REPLACED BY TRANSPLANT: ICD-10-CM

## 2017-01-16 DIAGNOSIS — Z79.60 LONG-TERM USE OF IMMUNOSUPPRESSANT MEDICATION: ICD-10-CM

## 2017-01-16 LAB
ALBUMIN SERPL BCP-MCNC: 3.2 G/DL
ANION GAP SERPL CALC-SCNC: 11 MMOL/L
BASOPHILS # BLD AUTO: 0.02 K/UL
BASOPHILS NFR BLD: 0.5 %
BUN SERPL-MCNC: 23 MG/DL
CALCIUM SERPL-MCNC: 9.2 MG/DL
CHLORIDE SERPL-SCNC: 103 MMOL/L
CO2 SERPL-SCNC: 22 MMOL/L
CREAT SERPL-MCNC: 2 MG/DL
DIFFERENTIAL METHOD: ABNORMAL
EOSINOPHIL # BLD AUTO: 0.1 K/UL
EOSINOPHIL NFR BLD: 1.8 %
ERYTHROCYTE [DISTWIDTH] IN BLOOD BY AUTOMATED COUNT: 13.7 %
EST. GFR  (AFRICAN AMERICAN): 39.9 ML/MIN/1.73 M^2
EST. GFR  (NON AFRICAN AMERICAN): 34.5 ML/MIN/1.73 M^2
GLUCOSE SERPL-MCNC: 473 MG/DL
HCT VFR BLD AUTO: 41.9 %
HGB BLD-MCNC: 14.2 G/DL
LYMPHOCYTES # BLD AUTO: 1.4 K/UL
LYMPHOCYTES NFR BLD: 31.2 %
MAGNESIUM SERPL-MCNC: 1.8 MG/DL
MCH RBC QN AUTO: 32.9 PG
MCHC RBC AUTO-ENTMCNC: 33.9 %
MCV RBC AUTO: 97 FL
MONOCYTES # BLD AUTO: 0.4 K/UL
MONOCYTES NFR BLD: 8 %
NEUTROPHILS # BLD AUTO: 2.5 K/UL
NEUTROPHILS NFR BLD: 57.6 %
PHOSPHATE SERPL-MCNC: 3.3 MG/DL
PLATELET # BLD AUTO: 184 K/UL
PMV BLD AUTO: 10.5 FL
POTASSIUM SERPL-SCNC: 4.7 MMOL/L
RBC # BLD AUTO: 4.32 M/UL
SODIUM SERPL-SCNC: 136 MMOL/L
TACROLIMUS BLD-MCNC: 18.5 NG/ML
WBC # BLD AUTO: 4.39 K/UL

## 2017-01-16 PROCEDURE — 99215 OFFICE O/P EST HI 40 MIN: CPT | Mod: PBBFAC | Performed by: INTERNAL MEDICINE

## 2017-01-16 PROCEDURE — 99214 OFFICE O/P EST MOD 30 MIN: CPT | Mod: S$PBB,,, | Performed by: INTERNAL MEDICINE

## 2017-01-16 PROCEDURE — 99999 PR PBB SHADOW E&M-EST. PATIENT-LVL V: CPT | Mod: PBBFAC,,, | Performed by: INTERNAL MEDICINE

## 2017-01-16 NOTE — PROGRESS NOTES
Infectious Diseases Clinic Note    Subjective:       Patient ID: Lavelle Ladd is a 61 y.o. male.    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 Pt with SOB for past month, now at rest with CT chest here with cavitary lung lesion in RUL and remains clinically very well found to have kleb pneumo growing from BAL and Enterobacter septicemia now s/p over 4 weeks of IV ertapenem.  Pt states SOB he was having is now improved after treatment      Past Medical History   Diagnosis Date    DINORAH (acute kidney injury) 2016    Arthritis     Chronic obstructive pulmonary disease 2016    Coronary artery disease involving native coronary artery of native heart without angina pectoris 2016     donor kidney transplant for DM 16      Induction with Thymo x3 and IV solumedrol to total 875mg  Kidney Biopsy  2016: 16 glomeruli, ACR type 1 AVR type 2, significant microcirculatory changes, c4d negative, No DSA, 5 to10% fibrosis. Treated with thymo x8 2016- no rejection      Diastolic heart failure     DM2 (diabetes mellitus, type 2)     Encounter for blood transfusion     ESRD on RRT since 10/2013 10/29/2013     Biopsy proven diabetic nephropathy and lymphoplasmacytic interstitial infiltrate not c/w with AIN (ddx sjogrens or assoc with tamm-horsefall protein extravasation)     GERD (gastroesophageal reflux disease)     Hep C w/o coma, chronic     Hyperlipidemia     Hypertension     Prophylactic immunotherapy     Proteinuria     Reactive airway disease     Renal hypertension     Type 2 diabetes mellitus with diabetic neuropathy, with long-term current use of insulin 2016    Type 2 diabetes mellitus  "with hyperglycemia, with long-term current use of insulin     Type 2 diabetes mellitus with retinopathy, with long-term current use of insulin 12/1/2016    Vitamin B12 deficiency        Social History     Social History    Marital status: Single     Spouse name: N/A    Number of children: N/A    Years of education: N/A     Occupational History    Disabled      Disabled     Social History Main Topics    Smoking status: Former Smoker     Packs/day: 1.00     Years: 40.00     Quit date: 1/11/2013    Smokeless tobacco: Never Used    Alcohol use No    Drug use: No    Sexual activity: No     Other Topics Concern    Not on file     Social History Narrative    Lives alone. Retired from construction and various jobs, now retired. Would like to return to work PT [facility].         Current Outpatient Prescriptions:     albuterol-ipratropium 2.5mg-0.5mg/3mL (DUO-NEB) 0.5 mg-3 mg(2.5 mg base)/3 mL nebulizer solution, Take 3 mLs by nebulization every 6 (six) hours., Disp: 120 vial, Rfl: 12    aspirin (ECOTRIN) 81 MG EC tablet, Take 1 tablet (81 mg total) by mouth once daily., Disp: 30 tablet, Rfl: 0    atorvastatin (LIPITOR) 10 MG tablet, Take 1 tablet (10 mg total) by mouth once daily. While on Harvoni., Disp: 90 tablet, Rfl: 0    atovaquone (MEPRON) 750 mg/5 mL Susp, Take 10 mLs (1,500 mg total) by mouth once daily., Disp: 300 mL, Rfl: 7    BD INSULIN PEN NEEDLE UF SHORT 31 gauge x 5/16" Ndselena, , Disp: , Rfl:     blood sugar diagnostic Strp, 1 each by Misc.(Non-Drug; Combo Route) route 3 (three) times daily., Disp: 100 each, Rfl: 11    blood sugar diagnostic Strp, 1 each by Misc.(Non-Drug; Combo Route) route 4 (four) times daily., Disp: 100 each, Rfl: 5    blood-glucose meter kit, Use as instructed, Disp: 1 each, Rfl: 0    budesonide-formoterol 160-4.5 mcg (SYMBICORT) 160-4.5 mcg/actuation HFAA, Inhale 2 puffs into the lungs every 12 (twelve) hours. Wash out mouth after using, Disp: 1 Inhaler, Rfl: " 12    ergocalciferol (ERGOCALCIFEROL) 50,000 unit Cap, Take 1 capsule (50,000 Units total) by mouth every 7 days. Take on Mondays, Disp: 4 capsule, Rfl: 11    ERTAPENEM SODIUM (ERTAPENEM, INVANZ, 1 G/100 ML NS, READY TO MIX,), Inject 100 mLs (1 g total) into the vein once daily., Disp: , Rfl:     famotidine (PEPCID) 20 MG tablet, Take 1 tablet (20 mg total) by mouth every evening., Disp: 30 tablet, Rfl: 11    inhalation device (BREATHERITE VALVED MDI CHAMBER), Use as directed for inhalation., Disp: 1 Device, Rfl: prn    insulin NPH (NOVOLIN N) 100 unit/mL injection, Take 20 units subq with breakfast and 8 units at bedtime, Disp: 1 vial, Rfl: 0    insulin regular 100 unit/mL Inj injection, Take 10 units subq with breakfast and 8 units with dinner, Disp: 10 mL, Rfl: 0    k phos di & mono-sod phos mono (K-PHOS-NEUTRAL) 250 mg Tab, Take 2 tablets by mouth 3 (three) times daily., Disp: 180 tablet, Rfl: 11    lancets Misc, 1 each by Misc.(Non-Drug; Combo Route) route 3 (three) times daily., Disp: 100 each, Rfl: 11    lancets Misc, 1 each by Misc.(Non-Drug; Combo Route) route 4 (four) times daily., Disp: 100 each, Rfl: 5    magnesium oxide (MAG-OX) 400 mg tablet, Take 1 tablet (400 mg total) by mouth 2 (two) times daily. (Patient taking differently: Take 400 mg by mouth once daily. ), Disp: 60 tablet, Rfl: 11    metoprolol tartrate (LOPRESSOR) 50 MG tablet, Take 0.5 tablets (25 mg total) by mouth 2 (two) times daily., Disp: 30 tablet, Rfl: 11    multivitamin (THERAGRAN) tablet, Take 1 tablet by mouth once daily., Disp: 30 tablet, Rfl: 11    mycophenolate (CELLCEPT) 250 mg Cap, Take 2 capsules (500 mg total) by mouth 2 (two) times daily. Z94.0 Kidney Transplant 5/21/2016., Disp: 120 capsule, Rfl: 11    nystatin (MYCOSTATIN) 100,000 unit/mL suspension, Take 5 mLs (500,000 Units total) by mouth 3 (three) times daily after meals., Disp: 473 mL, Rfl: 0    olanzapine (ZYPREXA) 5 MG tablet, Take 1 tablet (5 mg  "total) by mouth every evening. (Patient taking differently: Take 5 mg by mouth once daily. ), Disp: 30 tablet, Rfl: 11    ondansetron (ZOFRAN-ODT) 4 MG TbDL, Take 2 tablets (8 mg total) by mouth every 8 (eight) hours as needed (nausea)., Disp: 12 tablet, Rfl: 0    oxycodone (ROXICODONE) 15 MG Tab, Take 1 tablet (15 mg total) by mouth every 6 (six) hours as needed for Pain., Disp: 20 tablet, Rfl: 0    paroxetine (PAXIL) 10 MG tablet, Take 1 tablet (10 mg total) by mouth every morning., Disp: 30 tablet, Rfl: 11    pen needle, diabetic (EASY COMFORT PEN NEEDLES) 32 gauge x 5/32" Ndle, Inject 1 each into the skin 3 (three) times daily., Disp: 100 each, Rfl: 11    pen needle, diabetic 31 gauge x 1/4" Ndle, 1 each by Misc.(Non-Drug; Combo Route) route 4 (four) times daily., Disp: 100 each, Rfl: 0    predniSONE (DELTASONE) 10 MG tablet, 20 mg PO QD 12/3-1/2; 15 mg PO QD 1/3-2/2; 10 mg PO QD 2/3-3/2; 5 mg thereafter, Disp: 100 tablet, Rfl: 1    ropinirole (REQUIP) 1 MG tablet, Take 1 tablet (1 mg total) by mouth every evening., Disp: 30 tablet, Rfl: 11    sodium bicarbonate 650 MG tablet, Take 4 tablets BID  four hours before or after Harvoni, Disp: 240 tablet, Rfl: 11    tacrolimus (PROGRAF) 1 MG Cap, Take 4 mg in the AM and 3 mg in the PM by mouth. Z94.0 Kidney Transplant, Disp: 300 capsule, Rfl: 11    Review of Systems   Constitutional: Negative for activity change, appetite change, chills, fatigue and fever.   HENT: Negative for congestion, dental problem, mouth sores and sinus pressure.    Eyes: Negative for pain, redness and visual disturbance.   Respiratory: Negative for cough, shortness of breath and wheezing.    Cardiovascular: Negative for chest pain and leg swelling.   Gastrointestinal: Negative for abdominal distention, abdominal pain, diarrhea, nausea and vomiting.   Endocrine: Negative for polyuria.   Genitourinary: Negative for decreased urine volume, dysuria and frequency. "   Musculoskeletal: Negative for joint swelling.   Skin: Negative for color change.   Allergic/Immunologic: Negative for food allergies.   Neurological: Negative for dizziness, weakness and headaches.   Hematological: Negative for adenopathy.   Psychiatric/Behavioral: Negative for agitation and confusion. The patient is not nervous/anxious.            Objective:      Vitals:    01/16/17 1104   BP: (!) 164/70   Pulse: 84   Temp: 97.6 °F (36.4 °C)     Physical Exam   Constitutional: He is oriented to person, place, and time. He appears well-developed and well-nourished.   HENT:   Head: Normocephalic and atraumatic.   Mouth/Throat: Oropharynx is clear and moist.   Eyes: Conjunctivae are normal.   Neck: Neck supple.   Cardiovascular: Normal rate, regular rhythm and normal heart sounds.    No murmur heard.  Pulmonary/Chest: Effort normal and breath sounds normal. No respiratory distress. He has no wheezes.   Abdominal: Soft. Bowel sounds are normal. He exhibits no distension. There is no tenderness.   Musculoskeletal: Normal range of motion. He exhibits no edema or tenderness.   R brachial PICC c/d/i   Lymphadenopathy:     He has no cervical adenopathy.   Neurological: He is alert and oriented to person, place, and time. Coordination normal.   Skin: Skin is warm and dry. No rash noted.   Psychiatric: He has a normal mood and affect. His behavior is normal.           Assessment/Plan:       62 y/o M h/o DDKT 5/21/16 (CMV +/+, thymo induction, tacro/pred) multiple episodes of AVR 5/31 s/p 8 doses of thymo, ACR 8/15 s/p SM pulse and IVIG admitted 9/23/16 for neutropenia (thought to be drug induced) now admitted 11/30 with ACR s/p sM pulse, PLEX with improvement in renal fxn. Pt with SOB for past month, now at rest with CT chest here with cavitary lung lesion in RUL and remains clinically very well found to have kleb pneumo growing from BAL and Enterobacter septicemia now s/p over 4 weeks of IV ertapenem.  Pt states SOB he was  having is now improved after treatment  - repeat CT chest pending, continue IV ertapenem until repeat imaging and will decide at that time whether to extend, discussed with Dr. Badillo

## 2017-01-16 NOTE — PROGRESS NOTES
Results reviewed, and action is needed: Reinforce need for better glucose control, and to drink more fluids since has mild DINORAH. Please repeat tacrolimus (Prograf) within next week and ensure it is drawn 12 hour after last dose for accuracy.

## 2017-01-16 NOTE — NURSING
Received critical value:  Glucose 473  Reviewed with Dr Boston, VVO:chronic elevated glucose, defer to local endocrinologist.

## 2017-01-16 NOTE — TELEPHONE ENCOUNTER
Advised Carlos from [facility] to continue Ertapenem until two weeks or until pt gets his CT chest scan.

## 2017-01-17 ENCOUNTER — TELEPHONE (OUTPATIENT)
Dept: TRANSPLANT | Facility: CLINIC | Age: 62
End: 2017-01-17

## 2017-01-17 DIAGNOSIS — R91.1 LESION OF LUNG: Primary | ICD-10-CM

## 2017-01-17 NOTE — TELEPHONE ENCOUNTER
Call placed, labs reviewed by Dr. Boston. Patient with elevated Prograf level will repeat level on tomorrow. Unsure of true trough. Patient will also complete CT Chest per order or Dr. Mccollum, ID.

## 2017-01-18 ENCOUNTER — HOSPITAL ENCOUNTER (OUTPATIENT)
Dept: RADIOLOGY | Facility: HOSPITAL | Age: 62
Discharge: HOME | End: 2017-01-18
Attending: INTERNAL MEDICINE
Payer: MEDICARE

## 2017-01-18 DIAGNOSIS — R91.1 LESION OF LUNG: ICD-10-CM

## 2017-01-18 LAB
CYTOMEGALOVIRUS DNA: NOT DETECTED
CYTOMEGALOVIRUS LOG (IU/ML): <2.4 LOG (10) COPIES/ML
CYTOMEGALOVIRUS PCR, QUANT: <250 COPIES/ML
CYTOMEGALOVIRUS SOURCE: NORMAL

## 2017-01-18 PROCEDURE — 71250 CT THORAX DX C-: CPT | Mod: 26,,, | Performed by: RADIOLOGY

## 2017-01-19 RX ORDER — TACROLIMUS 1 MG/1
CAPSULE ORAL
Qty: 150 CAPSULE | Refills: 11 | Status: SHIPPED | OUTPATIENT
Start: 2017-01-19 | End: 2017-10-14 | Stop reason: SDUPTHER

## 2017-01-19 NOTE — TELEPHONE ENCOUNTER
Call placed, labs reviewed by Dr. Boston. Prograf dose decreased to 3/2. Patient states understanding.

## 2017-01-20 ENCOUNTER — TELEPHONE (OUTPATIENT)
Dept: INFECTIOUS DISEASES | Facility: CLINIC | Age: 62
End: 2017-01-20

## 2017-01-20 NOTE — TELEPHONE ENCOUNTER
S/p over 4 weeks of IV ertapenem with repeat CT chest imaging significantly improved and clinically better.  Will stop IV ertapenem at this time and remove PICC

## 2017-01-26 ENCOUNTER — TELEPHONE (OUTPATIENT)
Dept: TRANSPLANT | Facility: CLINIC | Age: 62
End: 2017-01-26

## 2017-01-26 NOTE — TELEPHONE ENCOUNTER
Call placed, labs reviewed by Dr. Boston. Patient reminded of need for compliance with medication regiment. Prograf level low, patient will repeat labs 1/30/17.

## 2017-01-30 ENCOUNTER — TELEPHONE (OUTPATIENT)
Dept: TRANSPLANT | Facility: CLINIC | Age: 62
End: 2017-01-30

## 2017-01-31 NOTE — TELEPHONE ENCOUNTER
Spoke with pt this pm.  Advised his glucose on labs this am were over 400.  Pt stated he checked his glucose this evening and  It was 230.  Took insulin as per his sliding scale.  Advised pt to go to ER for any glucose checks over 350.  Verbalized understanding.

## 2017-02-02 ENCOUNTER — TELEPHONE (OUTPATIENT)
Dept: TRANSPLANT | Facility: CLINIC | Age: 62
End: 2017-02-02

## 2017-02-02 NOTE — TELEPHONE ENCOUNTER
Call placed, labs reviewed by Dr. Boston with no changes. As previously agreed to, message left. Repeat labs 2/6/17.

## 2017-02-06 ENCOUNTER — LAB VISIT (OUTPATIENT)
Dept: LAB | Facility: HOSPITAL | Age: 62
End: 2017-02-06
Attending: INTERNAL MEDICINE
Payer: MEDICARE

## 2017-02-06 ENCOUNTER — EPISODE CHANGES (OUTPATIENT)
Dept: HEPATOLOGY | Facility: CLINIC | Age: 62
End: 2017-02-06

## 2017-02-06 DIAGNOSIS — B18.2 CHRONIC HEPATITIS C WITHOUT HEPATIC COMA: ICD-10-CM

## 2017-02-06 LAB
ALBUMIN SERPL BCP-MCNC: 3.5 G/DL
ALP SERPL-CCNC: 115 U/L
ALT SERPL W/O P-5'-P-CCNC: 21 U/L
ANION GAP SERPL CALC-SCNC: 11 MMOL/L
AST SERPL-CCNC: 15 U/L
BILIRUB SERPL-MCNC: 0.6 MG/DL
BUN SERPL-MCNC: 21 MG/DL
CALCIUM SERPL-MCNC: 9.8 MG/DL
CHLORIDE SERPL-SCNC: 99 MMOL/L
CO2 SERPL-SCNC: 26 MMOL/L
CREAT SERPL-MCNC: 1.9 MG/DL
EST. GFR  (AFRICAN AMERICAN): 42.4 ML/MIN/1.73 M^2
EST. GFR  (NON AFRICAN AMERICAN): 36.7 ML/MIN/1.73 M^2
GLUCOSE SERPL-MCNC: 400 MG/DL
POTASSIUM SERPL-SCNC: 4.3 MMOL/L
PROT SERPL-MCNC: 7.2 G/DL
SODIUM SERPL-SCNC: 136 MMOL/L

## 2017-02-06 PROCEDURE — 80053 COMPREHEN METABOLIC PANEL: CPT

## 2017-02-06 PROCEDURE — 87522 HEPATITIS C REVRS TRNSCRPJ: CPT

## 2017-02-06 PROCEDURE — 36415 COLL VENOUS BLD VENIPUNCTURE: CPT | Mod: PO

## 2017-02-07 ENCOUNTER — TELEPHONE (OUTPATIENT)
Dept: TRANSPLANT | Facility: CLINIC | Age: 62
End: 2017-02-07

## 2017-02-07 NOTE — TELEPHONE ENCOUNTER
----- Message from Tiana Blanco MD sent at 2/7/2017  2:06 PM CST -----  Results reviewed, and action is needed: Please repeat tacrolimus (Prograf) within next week and ensure it is drawn 12 hour after last dose for accuracy. I cannot make sense of fluctuating levels.

## 2017-02-08 ENCOUNTER — TELEPHONE (OUTPATIENT)
Dept: HEPATOLOGY | Facility: CLINIC | Age: 62
End: 2017-02-08

## 2017-02-08 LAB
HCV LOG: <1.08 LOG (10) IU/ML
HCV RNA QUANT PCR: <12 IU/ML
HCV, QUALITATIVE: NOT DETECTED IU/ML

## 2017-02-08 NOTE — TELEPHONE ENCOUNTER
HCV LAB REVIEW  Completed 12 weeks of Harvoni - 1/9/17  Radha 1b, tx naive, Stage 1 fibrosis  Post kidney transplant    Pertinent labs:  2/6/17  CMP stable overall  LFTs normal  HCV neg - SVR4      pls call pt:  Liver numbers are now normal  HCV is negative still  There is a small chance the virus can return. We will monitor blood for this.      Next labs due  - CMP, HCV RNA - SVR12 - 4/3

## 2017-02-09 NOTE — TELEPHONE ENCOUNTER
MA called pt to relay provider message. Call successful. Spoke with pt.  Provider message relayed. Pt verbalized understanding. Labs scheduled for 4/3/17.Lp

## 2017-02-20 ENCOUNTER — LAB VISIT (OUTPATIENT)
Dept: LAB | Facility: HOSPITAL | Age: 62
End: 2017-02-20
Attending: INTERNAL MEDICINE
Payer: MEDICARE

## 2017-02-20 DIAGNOSIS — Z94.0 KIDNEY REPLACED BY TRANSPLANT: ICD-10-CM

## 2017-02-20 LAB
ALBUMIN SERPL BCP-MCNC: 3.1 G/DL
ANION GAP SERPL CALC-SCNC: 11 MMOL/L
BASOPHILS # BLD AUTO: 0.02 K/UL
BASOPHILS NFR BLD: 0.6 %
BUN SERPL-MCNC: 11 MG/DL
CALCIUM SERPL-MCNC: 9.5 MG/DL
CHLORIDE SERPL-SCNC: 107 MMOL/L
CO2 SERPL-SCNC: 22 MMOL/L
CREAT SERPL-MCNC: 1.7 MG/DL
DIFFERENTIAL METHOD: ABNORMAL
EOSINOPHIL # BLD AUTO: 0.1 K/UL
EOSINOPHIL NFR BLD: 2.9 %
ERYTHROCYTE [DISTWIDTH] IN BLOOD BY AUTOMATED COUNT: 14.1 %
EST. GFR  (AFRICAN AMERICAN): 48.5 ML/MIN/1.73 M^2
EST. GFR  (NON AFRICAN AMERICAN): 42 ML/MIN/1.73 M^2
GLUCOSE SERPL-MCNC: 168 MG/DL
HCT VFR BLD AUTO: 43.5 %
HGB BLD-MCNC: 14.4 G/DL
LYMPHOCYTES # BLD AUTO: 1.4 K/UL
LYMPHOCYTES NFR BLD: 40.9 %
MAGNESIUM SERPL-MCNC: 1.5 MG/DL
MCH RBC QN AUTO: 32.4 PG
MCHC RBC AUTO-ENTMCNC: 33.1 %
MCV RBC AUTO: 98 FL
MONOCYTES # BLD AUTO: 0.4 K/UL
MONOCYTES NFR BLD: 11.9 %
NEUTROPHILS # BLD AUTO: 1.5 K/UL
NEUTROPHILS NFR BLD: 43.1 %
PHOSPHATE SERPL-MCNC: 3.2 MG/DL
PLATELET # BLD AUTO: 196 K/UL
PMV BLD AUTO: 10.9 FL
POTASSIUM SERPL-SCNC: 4.6 MMOL/L
RBC # BLD AUTO: 4.44 M/UL
SODIUM SERPL-SCNC: 140 MMOL/L
WBC # BLD AUTO: 3.45 K/UL

## 2017-02-20 PROCEDURE — 83735 ASSAY OF MAGNESIUM: CPT

## 2017-02-20 PROCEDURE — 36415 COLL VENOUS BLD VENIPUNCTURE: CPT | Mod: PO

## 2017-02-20 PROCEDURE — 85025 COMPLETE CBC W/AUTO DIFF WBC: CPT

## 2017-02-20 PROCEDURE — 80069 RENAL FUNCTION PANEL: CPT

## 2017-02-20 PROCEDURE — 80197 ASSAY OF TACROLIMUS: CPT

## 2017-02-21 LAB — TACROLIMUS BLD-MCNC: 9.8 NG/ML

## 2017-02-22 ENCOUNTER — OFFICE VISIT (OUTPATIENT)
Dept: TRANSPLANT | Facility: CLINIC | Age: 62
End: 2017-02-22
Payer: MEDICARE

## 2017-02-22 ENCOUNTER — LAB VISIT (OUTPATIENT)
Dept: LAB | Facility: HOSPITAL | Age: 62
End: 2017-02-22
Payer: MEDICARE

## 2017-02-22 VITALS
BODY MASS INDEX: 29.01 KG/M2 | SYSTOLIC BLOOD PRESSURE: 168 MMHG | HEART RATE: 70 BPM | TEMPERATURE: 98 F | RESPIRATION RATE: 18 BRPM | HEIGHT: 70 IN | OXYGEN SATURATION: 97 % | DIASTOLIC BLOOD PRESSURE: 105 MMHG | WEIGHT: 202.63 LBS

## 2017-02-22 DIAGNOSIS — T86.11 KIDNEY TRANSPLANT REJECTION: ICD-10-CM

## 2017-02-22 DIAGNOSIS — E78.5 HYPERLIPIDEMIA, UNSPECIFIED HYPERLIPIDEMIA TYPE: ICD-10-CM

## 2017-02-22 DIAGNOSIS — N18.30 CKD (CHRONIC KIDNEY DISEASE) STAGE 3, GFR 30-59 ML/MIN: ICD-10-CM

## 2017-02-22 DIAGNOSIS — J98.4 CAVITARY LESION OF LUNG: ICD-10-CM

## 2017-02-22 DIAGNOSIS — Z79.60 LONG-TERM USE OF IMMUNOSUPPRESSANT MEDICATION: ICD-10-CM

## 2017-02-22 DIAGNOSIS — R06.02 SHORTNESS OF BREATH: ICD-10-CM

## 2017-02-22 DIAGNOSIS — Z94.0 KIDNEY REPLACED BY TRANSPLANT: ICD-10-CM

## 2017-02-22 DIAGNOSIS — Z94.0 S/P KIDNEY TRANSPLANT: Primary | Chronic | ICD-10-CM

## 2017-02-22 DIAGNOSIS — Z29.89 PROPHYLACTIC IMMUNOTHERAPY: Chronic | ICD-10-CM

## 2017-02-22 LAB
BASOPHILS # BLD AUTO: 0.02 K/UL
BASOPHILS NFR BLD: 0.6 %
CYTOMEGALOVIRUS DNA: NOT DETECTED
CYTOMEGALOVIRUS LOG (IU/ML): <2.4 LOG (10) COPIES/ML
CYTOMEGALOVIRUS PCR, QUANT: <250 COPIES/ML
CYTOMEGALOVIRUS SOURCE: NORMAL
DIFFERENTIAL METHOD: ABNORMAL
EOSINOPHIL # BLD AUTO: 0.1 K/UL
EOSINOPHIL NFR BLD: 2.6 %
ERYTHROCYTE [DISTWIDTH] IN BLOOD BY AUTOMATED COUNT: 14 %
HCT VFR BLD AUTO: 43.2 %
HGB BLD-MCNC: 14.4 G/DL
LYMPHOCYTES # BLD AUTO: 1.1 K/UL
LYMPHOCYTES NFR BLD: 31.9 %
MCH RBC QN AUTO: 32.7 PG
MCHC RBC AUTO-ENTMCNC: 33.3 %
MCV RBC AUTO: 98 FL
MONOCYTES # BLD AUTO: 0.5 K/UL
MONOCYTES NFR BLD: 13.4 %
NEUTROPHILS # BLD AUTO: 1.8 K/UL
NEUTROPHILS NFR BLD: 50.9 %
PLATELET # BLD AUTO: 175 K/UL
PMV BLD AUTO: 10.6 FL
RBC # BLD AUTO: 4.41 M/UL
WBC # BLD AUTO: 3.51 K/UL

## 2017-02-22 PROCEDURE — 99215 OFFICE O/P EST HI 40 MIN: CPT | Mod: S$PBB,,, | Performed by: INTERNAL MEDICINE

## 2017-02-22 PROCEDURE — 99999 PR PBB SHADOW E&M-EST. PATIENT-LVL III: CPT | Mod: PBBFAC,,, | Performed by: INTERNAL MEDICINE

## 2017-02-22 PROCEDURE — 36415 COLL VENOUS BLD VENIPUNCTURE: CPT

## 2017-02-22 PROCEDURE — 87040 BLOOD CULTURE FOR BACTERIA: CPT

## 2017-02-22 PROCEDURE — 85025 COMPLETE CBC W/AUTO DIFF WBC: CPT

## 2017-02-22 RX ORDER — TRAMADOL HYDROCHLORIDE 100 MG/1
100 TABLET, EXTENDED RELEASE ORAL
COMMUNITY
End: 2017-09-19

## 2017-02-22 RX ORDER — ATORVASTATIN CALCIUM 20 MG/1
20 TABLET, FILM COATED ORAL DAILY
Qty: 30 TABLET | Refills: 11 | Status: SHIPPED | OUTPATIENT
Start: 2017-02-22 | End: 2017-03-16 | Stop reason: ALTCHOICE

## 2017-02-22 NOTE — Clinical Note
HOLD KPN for now and see what his level does off supplement (will need to notify him as not seen he was on supplements during visit). [facility]

## 2017-02-22 NOTE — PROGRESS NOTES
Kidney Post-Transplant Assessment    Referring Physician: Avel Estrada  Current Nephrologist: Avel Estrada    ORGAN: RIGHT KIDNEY  Donor Type:  - brain death  PHS Increased Risk: yes  Cold Ischemia: 1088 mins  Induction Medications: thymoglobulin    Subjective:     CC:  Reassessment of renal allograft function and management of chronic immunosuppression.    HPI:  Mr. Ladd is a 61 y.o. year old White male who received a  - brain death kidney transplant on 16.  He has CKD stage 3 - GFR 30-59 and his baseline creatinine is between 1.7-1.9. He takes mycophenolate mofetil, prednisone and tacrolimus for maintenance immunosuppression. He denies any recent hospitalizations or ER visits since his previous clinic visit.    Pertinent History:  ESRD from DM, on hemodialysis started on 10/2013  Organ: SCD, increased PHS risk donor, 54% KDPI  partial dehiscence. 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 Thymo x8 doses (3 half doses, others full dose)  2016 f/u Biopsy - no rejection   +orthostatic hypotension in hospital, d/c'd on midodrine (d/c'd 16)  8/15/16: suspicious for ACR and AMR, no AVR. Rx- SMx3 - Non HLA Ab neg   biopsy with ACR s/p SM pulse and PLEX x3; IVIG in BR   Dec 2016 + SOB and dound to have RUL cavitary lung lesion, found to have kleb pneumo growing from BAL and Enterobacter septicemia now s/p over 4 weeks of IV ertapenem    Today Lavelle complains that his breathing is bothering him.  He states he feels like he did when he had pneumonia before.  He reports he gets short of breath easily when he lies down or with any exertion.  On arrival to clinic, his room air sat was normal at 97%.  He reports he has a CT scan and pulmonary appointment scheduled  "coming up, but it is not until March 1.  He notes several days ago he felt bad enough he almost thought about going to the ED.  He has a nebulizer at home, but reports he has not been using it.     Other than chest complaints, he reports no other symptoms.  He has no ear nose throat problems, chest pain, nausea, vomiting, abdominal pain, musculoskeletal concerns.  He also has no urinary concerns: He denies any kidney-related complaints, such as pain over allograft, flank pain, dysuria, frequency, or other LUTS.     Review of Systems   Constitutional: Positive for fatigue. Negative for diaphoresis and fever.   Respiratory: Positive for cough and shortness of breath (intermittent).    Cardiovascular: Negative for chest pain and leg swelling.   Gastrointestinal: Negative for abdominal distention, abdominal pain, constipation, diarrhea, nausea and vomiting.   Genitourinary: Negative for decreased urine volume, flank pain, frequency and hematuria.   Musculoskeletal: Positive for back pain (chronic). Negative for arthralgias and myalgias.        Right BKA with prosthesis   Skin: Negative for rash.   Allergic/Immunologic: Positive for immunocompromised state.   Neurological: Negative for tremors, weakness, light-headedness and headaches.   Psychiatric/Behavioral: Negative for confusion and sleep disturbance. The patient is not nervous/anxious.      Objective:     Blood pressure (!) 168/105, pulse 70, temperature 97.8 °F (36.6 °C), temperature source Oral, resp. rate 18, height 5' 10" (1.778 m), weight 91.9 kg (202 lb 9.6 oz), SpO2 97 %.body mass index is 29.07 kg/(m^2).    Physical Exam   Constitutional: He is oriented to person, place, and time. He appears well-developed and well-nourished.   HENT:   Head: Normocephalic.   Eyes: Conjunctivae are normal.   Neck: No JVD present.   Pulmonary/Chest: Effort normal and breath sounds normal.   + rhonchi   Abdominal: Soft. He exhibits no mass. There is no tenderness. "   Musculoskeletal: He exhibits no edema.   Lymphadenopathy:     He has no cervical adenopathy.   Neurological: He is alert and oriented to person, place, and time.   Skin: Skin is dry. No rash noted.   Psychiatric: He has a normal mood and affect. Judgment normal.     Labs:  Lab Results   Component Value Date    WBC 3.45 (L) 2017    HGB 14.4 2017    HCT 43.5 2017     2017    K 4.6 2017     2017    CO2 22 (L) 2017    BUN 11 2017    CREATININE 1.7 (H) 2017    EGFRNONAA 42.0 (A) 2017    CALCIUM 9.5 2017    PHOS 3.2 2017    MG 1.5 (L) 2017    ALBUMIN 3.1 (L) 2017    AST 19 2017    ALT 22 2017    UTPCR 0.38 (H) 2017    .0 (H) 2016    TACROLIMUS 9.8 2017   Labs were reviewed with the patient.    Assessment:     1.  donor kidney transplant for DM 16    2. Hyperlipidemia, unspecified hyperlipidemia type    3. Prophylactic immunotherapy    4. Long-term use of immunosuppressant medication    5. Kidney transplant rejection    6. CKD (chronic kidney disease) stage 3, GFR 30-59 ml/min    7. Cavitary lesion of lung    8. Shortness of breath        Plan:     Allograft function assessment  Lab Results   Component Value Date    CREATININE 1.7 (H) 2017   CKD3 s/p kidney transplantation at baseline, . Continue to monitor renal function and electrolytes, HTN, secondary hyperparathyroidism and other issues related to underlying ESRD.3     Recurrent Dyspnea - although he is scheduled for CT of chest and pulmonary follow-up next week, I will try to move up appointment given his worsened symptoms.  He is advised to go to ED if his symptoms worsen in the meantime.    Immunosuppression evaluation  He reports he has been taking tacrolimus 4 mg every morning and 2 mg every afternoon.  I have advised him to return back to 3 mg in the a.m. and 2 in the PM.      Evaluation for bone marrow  suppression (r/t immunosuppression toxicity or infection)  Lab Results   Component Value Date    WBC 3.45 (L) 02/20/2017    HGB 14.4 02/20/2017    HCT 43.5 02/20/2017     02/20/2017   Neutropenia persists but, overall stable.      Metabolic bone disease [Secondary hyperparathyroidism, Phosphorus metabolism disorders]  Lab Results   Component Value Date    .0 (H) 09/19/2016    CALCIUM 9.5 02/20/2017    CAION 1.10 05/30/2016    PHOS 3.2 02/20/2017   Calcium and phosphorus are acceptable at the present time.  Has history of hypophosphatemia.  We will HOLD for now and see what his level does off supplement (will need to notify him as not seen he was on supplements during visit).  Continue to follow with episodic labs.      Assessment of electrolytes  Lab Results   Component Value Date     02/20/2017    K 4.6 02/20/2017    CO2 22 (L) 02/20/2017     02/20/2017    MG 1.5 (L) 02/20/2017   Mild hypomagnesemia noted, acceptable given difficulty tolerating supplements. continue Mag-Ox 400 mg once daily  Acidosis-continue sodium bicarbonate.      Screening for BK infection to prevent allograft dysfunction  Lab Results   Component Value Date    BKVIRUSPCRQB <250 02/13/2017   -BK virus NOT detected in patient's blood, as desired. Continue blood PCR screening as per program guidelines to prevent BK viremia and nephropathy leading to allograft dysfunction and potential graft failure   Continue blood PCR screening as per program guidelines to prevent BK viremia and nephropathy leading to allograft dysfunction and potential graft failure    Screening for proteinuria  Lab Results   Component Value Date    PROTEINURINE 29 (H) 02/13/2017    CREATRANDUR 77.0 02/13/2017    UTPCR 0.38 (H) 02/13/2017    Negligible, as desired. Continue monitoring as per program guidelines      Follow-up:   Clinic: return to transplant clinic weekly for the first month after transplant; every 2 weeks during months 2-3; then at 6-,  9-, 12-, 18-, 24-, and 36- months post-transplant to reassess for complications from immunosuppression toxicity and monitor for rejection.  Annually thereafter.    Labs: since patient remains at high risk for rejection and drug-related complications that warrant close monitoring, labs will be ordered as follows: continue twice weekly CBC, renal panel, and drug level for first month; then same labs once weekly through 3rd month post-transplant.  Urine for UA and protein/creatinine ratio monthly.  Urine BK - PCR at 1-, 3-, 6-, 9-, 12-, 18-, 24-, and 36- months post-transplant.  Hepatic panel at 1-, 2-, 3-, 6-, 9-, 12-, 18-, 24-, and 36- months post-transplant.    Tiana Blanco MD       Education:   Material provided to the patient.  Patient reminded to call with any health changes since these can be early signs of significant complications.  Also, I advised the patient to be sure any new medications or changes of old medications are discussed with either a pharmacist or physician knowledgeable with transplant to avoid rejection/drug toxicity related to significant drug interactions.    UNOS Patient Status  Functional Status: 60% - Requires occasional assistance but is able to care for needs  Physical Capacity: Limited Mobility sometimes she might see your patient and it would only be

## 2017-02-22 NOTE — LETTER
February 22, 2017        Avel Estrada  [address]  [place] [address]  Phone: [phone]  Fax: [phone]             [facility]  [address]  [facility] [address]  Phone: [phone]   Patient: Lavelle Ladd   MR Number: [id number]   YOB: 1955   Date of Visit: 2/22/2017       Dear Dr. Avel Estrada    Thank you for referring Lavelle Ladd to me for evaluation. Attached you will find relevant portions of my assessment and plan of care.    If you have questions, please do not hesitate to call me. I look forward to following Lavelle Ladd along with you.    Sincerely,    Tiana Blanco MD    Enclosure    If you would like to receive this communication electronically, please contact [email or web address] or [phone] to request [employer] access.    [employer] is a tool which provides read-only access to select patient information with whom you have a relationship. Its easy to use and provides real time access to review your patients record including encounter summaries, notes, results, and demographic information.    If you feel you have received this communication in error or would no longer like to receive these types of communications, please e-mail [email or web address]

## 2017-02-22 NOTE — PATIENT INSTRUCTIONS
From Dr. Apple:  It is my privilege to participate in your post-transplant care!      Go back to tacrolimus 3 mg in AM and 2 mg in PM.  Get blood cultures today.  We will try to move up CT scan.  Go to ED if breathing worsens.    Please be sure to let us know if you have any questions or concerns about your health care - we cannot help you if we do not know.  Don't forget we are on call 24/7 for any emergencies.   Best Wishes,  Dr. Ambika Blanco

## 2017-02-23 ENCOUNTER — HOSPITAL ENCOUNTER (OUTPATIENT)
Dept: RADIOLOGY | Facility: HOSPITAL | Age: 62
Discharge: HOME | End: 2017-02-23
Attending: INTERNAL MEDICINE
Payer: MEDICARE

## 2017-02-23 DIAGNOSIS — J15.0: ICD-10-CM

## 2017-02-23 PROCEDURE — 71250 CT THORAX DX C-: CPT | Mod: 26,,, | Performed by: RADIOLOGY

## 2017-02-23 PROCEDURE — 71250 CT THORAX DX C-: CPT | Mod: TC,PO

## 2017-02-27 LAB — BACTERIA BLD CULT: NORMAL

## 2017-03-01 ENCOUNTER — HOSPITAL ENCOUNTER (OUTPATIENT)
Dept: RADIOLOGY | Facility: HOSPITAL | Age: 62
Discharge: HOME | End: 2017-03-01
Attending: INTERNAL MEDICINE
Payer: MEDICARE

## 2017-03-01 ENCOUNTER — OFFICE VISIT (OUTPATIENT)
Dept: PULMONOLOGY | Facility: CLINIC | Age: 62
End: 2017-03-01
Payer: MEDICARE

## 2017-03-01 VITALS
WEIGHT: 208.56 LBS | SYSTOLIC BLOOD PRESSURE: 124 MMHG | HEART RATE: 72 BPM | DIASTOLIC BLOOD PRESSURE: 76 MMHG | OXYGEN SATURATION: 98 % | HEIGHT: 70 IN | BODY MASS INDEX: 29.86 KG/M2

## 2017-03-01 DIAGNOSIS — R06.02 SOB (SHORTNESS OF BREATH): ICD-10-CM

## 2017-03-01 DIAGNOSIS — J44.89 ASTHMA WITH COPD: ICD-10-CM

## 2017-03-01 DIAGNOSIS — J44.9 CHRONIC OBSTRUCTIVE PULMONARY DISEASE, UNSPECIFIED COPD TYPE: ICD-10-CM

## 2017-03-01 DIAGNOSIS — R06.02 SOB (SHORTNESS OF BREATH): Primary | ICD-10-CM

## 2017-03-01 PROCEDURE — 71020 XR CHEST PA AND LATERAL: CPT | Mod: TC,PO

## 2017-03-01 PROCEDURE — 71020 XR CHEST PA AND LATERAL: CPT | Mod: 26,,, | Performed by: RADIOLOGY

## 2017-03-01 PROCEDURE — 99214 OFFICE O/P EST MOD 30 MIN: CPT | Mod: S$PBB,,, | Performed by: INTERNAL MEDICINE

## 2017-03-01 PROCEDURE — 99999 PR PBB SHADOW E&M-EST. PATIENT-LVL V: CPT | Mod: PBBFAC,,, | Performed by: INTERNAL MEDICINE

## 2017-03-01 RX ORDER — IPRATROPIUM BROMIDE AND ALBUTEROL SULFATE 2.5; .5 MG/3ML; MG/3ML
3 SOLUTION RESPIRATORY (INHALATION) EVERY 6 HOURS
Qty: 120 VIAL | Refills: 12 | Status: SHIPPED | OUTPATIENT
Start: 2017-03-01 | End: 2017-03-01 | Stop reason: SDUPTHER

## 2017-03-01 RX ORDER — BUDESONIDE AND FORMOTEROL FUMARATE DIHYDRATE 160; 4.5 UG/1; UG/1
2 AEROSOL RESPIRATORY (INHALATION) EVERY 12 HOURS
Qty: 1 INHALER | Refills: 12 | Status: SHIPPED | OUTPATIENT
Start: 2017-03-01 | End: 2017-09-06 | Stop reason: SDUPTHER

## 2017-03-01 RX ORDER — IPRATROPIUM BROMIDE AND ALBUTEROL SULFATE 2.5; .5 MG/3ML; MG/3ML
3 SOLUTION RESPIRATORY (INHALATION) EVERY 6 HOURS
Qty: 120 VIAL | Refills: 12 | Status: SHIPPED | OUTPATIENT
Start: 2017-03-01 | End: 2017-09-06 | Stop reason: SDUPTHER

## 2017-03-01 NOTE — PROGRESS NOTES
Subjective:       Patient ID: Lavelle Ladd is a 61 y.o. male.    Chief Complaint: He       Pneumonia    HPI   He feels like he is having a relapse of pneumonia  C/o chest congestion  He presents for evaluation and treatment of pneumonia - Klebsiella after being discharged from the hospital 3 month ago. Since discharge symptoms have unchanged course since that time. Patient denies fever or chills. Symptoms are aggravated by activity. Symptoms improve with rest.  Respiratory: positive for dyspnea on exertion and wheezing; Cardiovascular: no chest pain or palpitations.  Patient currently is not on home oxygen therapy..  Depressed - no energy    NO sinus congestion, c/o shortness of breath     Past Medical History:   Diagnosis Date    DINORAH (acute kidney injury) 2016    Arthritis     Chronic obstructive pulmonary disease 2016    Coronary artery disease involving native coronary artery of native heart without angina pectoris 2016     donor kidney transplant for DM 16     Induction with Thymo x3 and IV solumedrol to total 875mg  Kidney Biopsy  2016: 16 glomeruli, ACR type 1 AVR type 2, significant microcirculatory changes, c4d negative, No DSA, 5 to10% fibrosis. Treated with thymo x8 2016- no rejection      Diastolic heart failure     DM2 (diabetes mellitus, type 2)     Encounter for blood transfusion     ESRD on RRT since 10/2013 10/29/2013    Biopsy proven diabetic nephropathy and lymphoplasmacytic interstitial infiltrate not c/w with AIN (ddx sjogrens or assoc with tamm-horsefall protein extravasation)     GERD (gastroesophageal reflux disease)     Hep C w/o coma, chronic     Hyperlipidemia     Hypertension     Prophylactic immunotherapy     Proteinuria     Reactive airway disease     Renal hypertension     Type 2 diabetes mellitus with diabetic neuropathy, with long-term current use of insulin 2016    Type 2 diabetes mellitus with hyperglycemia, with  long-term current use of insulin     Type 2 diabetes mellitus with retinopathy, with long-term current use of insulin 12/1/2016    Vitamin B12 deficiency      Past Surgical History:   Procedure Laterality Date    av bovine graft      Left UE    AV FISTULA PLACEMENT      left UE    CARDIAC CATHETERIZATION  02/2015    KIDNEY TRANSPLANT      LEG AMPUTATION THROUGH KNEE  2011    right LE, started as nail puncture leading to diabetic ulcer     Social History     Social History    Marital status: Single     Spouse name: N/A    Number of children: N/A    Years of education: N/A     Occupational History    Disabled      Disabled     Social History Main Topics    Smoking status: Former Smoker     Packs/day: 1.00     Years: 40.00     Quit date: 1/11/2013    Smokeless tobacco: Never Used    Alcohol use No    Drug use: No    Sexual activity: No     Other Topics Concern    Not on file     Social History Narrative    Lives alone. Retired from construction and various jobs, now retired. Would like to return to work PT to aleviate boredom.     Review of Systems   Constitutional: Positive for fatigue. Negative for fever.   HENT: Positive for postnasal drip and rhinorrhea. Negative for congestion.    Respiratory: Positive for cough, sputum production, shortness of breath, dyspnea on extertion, use of rescue inhaler and Paroxysmal Nocturnal Dyspnea.    Cardiovascular: Negative for chest pain, palpitations and leg swelling.   Skin: Negative for rash.   Gastrointestinal: Negative for nausea and abdominal pain.   Neurological: Negative for dizziness, syncope, weakness and light-headedness.   Hematological: Negative for adenopathy. Does not bruise/bleed easily.   Psychiatric/Behavioral: Negative for sleep disturbance. The patient is not nervous/anxious.        Objective:      Physical Exam   Constitutional: He is oriented to person, place, and time. He appears well-developed and well-nourished.   HENT:   Head:  Normocephalic and atraumatic.   Eyes: Conjunctivae are normal. Pupils are equal, round, and reactive to light.   Neck: Neck supple. No JVD present. No tracheal deviation present. No thyromegaly present.   Cardiovascular: Normal rate and regular rhythm.    No murmur heard.  Pulmonary/Chest: He has decreased breath sounds. He has wheezes in the right lower field and the left lower field. He has no rhonchi. He has no rales.   Abdominal: Soft. Bowel sounds are normal.   Musculoskeletal: He exhibits no edema or tenderness.   Amputation left leg   Lymphadenopathy:     He has no cervical adenopathy.   Neurological: He is alert and oriented to person, place, and time.   Skin: Skin is warm and dry.   Nursing note and vitals reviewed.    Personal Diagnostic Review  Chest x-ray: hyperinflation    Spirometry: mod obsrtruction    Results for AISHA CURRIE (MRN [id number]) as of 3/1/2017 10:01   Ref. Range 2/27/2017 08:05   WBC Latest Ref Range: 3.90 - 12.70 K/uL 2.88 (L)   RBC Latest Ref Range: 4.60 - 6.20 M/uL 4.19 (L)   Hemoglobin Latest Ref Range: 14.0 - 18.0 g/dL 13.5 (L)   Hematocrit Latest Ref Range: 40.0 - 54.0 % 41.1   MCV Latest Ref Range: 82 - 98 fL 98   MCH Latest Ref Range: 27.0 - 31.0 pg 32.2 (H)   MCHC Latest Ref Range: 32.0 - 36.0 % 32.8   RDW Latest Ref Range: 11.5 - 14.5 % 14.0   Platelets Latest Ref Range: 150 - 350 K/uL 197   MPV Latest Ref Range: 9.2 - 12.9 fL 10.7   Gran% Latest Ref Range: 38.0 - 73.0 % 41.3   Gran # Latest Ref Range: 1.8 - 7.7 K/uL 1.2 (L)   Lymph% Latest Ref Range: 18.0 - 48.0 % 42.4   Lymph # Latest Ref Range: 1.0 - 4.8 K/uL 1.2   Mono% Latest Ref Range: 4.0 - 15.0 % 11.5   Mono # Latest Ref Range: 0.3 - 1.0 K/uL 0.3   Eosinophil% Latest Ref Range: 0.0 - 8.0 % 3.8   Eos # Latest Ref Range: 0.0 - 0.5 K/uL 0.1   Basophil% Latest Ref Range: 0.0 - 1.9 % 0.3   Baso # Latest Ref Range: 0.00 - 0.20 K/uL 0.01     No flowsheet data found.      Assessment:       1. SOB (shortness of breath)    2.  "Asthma with COPD    3. Chronic obstructive pulmonary disease, unspecified COPD type        Outpatient Encounter Prescriptions as of 3/1/2017   Medication Sig Dispense Refill    aspirin (ECOTRIN) 81 MG EC tablet Take 1 tablet (81 mg total) by mouth once daily. 30 tablet 0    ergocalciferol (ERGOCALCIFEROL) 50,000 unit Cap Take 1 capsule (50,000 Units total) by mouth every 7 days. Take on Mondays 4 capsule 11    famotidine (PEPCID) 20 MG tablet Take 1 tablet (20 mg total) by mouth every evening. 30 tablet 11    inhalation device (BREATHERITE VALVED MDI CHAMBER) Use as directed for inhalation. 1 Device prn    insulin NPH (NOVOLIN N) 100 unit/mL injection Take 20 units subq with breakfast and 8 units at bedtime (Patient taking differently: Take 20 units subq with breakfast and 10 units at bedtime) 1 vial 0    lancets Misc 1 each by Misc.(Non-Drug; Combo Route) route 3 (three) times daily. 100 each 11    magnesium oxide (MAG-OX) 400 mg tablet Take 1 tablet (400 mg total) by mouth 2 (two) times daily. (Patient taking differently: Take 400 mg by mouth once daily. ) 60 tablet 11    multivitamin (THERAGRAN) tablet Take 1 tablet by mouth once daily. 30 tablet 11    mycophenolate (CELLCEPT) 250 mg Cap Take 2 capsules (500 mg total) by mouth 2 (two) times daily. Z94.0 Kidney Transplant 5/21/2016. 120 capsule 11    olanzapine (ZYPREXA) 5 MG tablet Take 1 tablet (5 mg total) by mouth every evening. (Patient taking differently: Take 5 mg by mouth once daily. ) 30 tablet 11    ondansetron (ZOFRAN-ODT) 4 MG TbDL Take 2 tablets (8 mg total) by mouth every 8 (eight) hours as needed (nausea). 12 tablet 0    oxycodone (ROXICODONE) 15 MG Tab Take 1 tablet (15 mg total) by mouth every 6 (six) hours as needed for Pain. 20 tablet 0    pen needle, diabetic (EASY COMFORT PEN NEEDLES) 32 gauge x 5/32" Ndle Inject 1 each into the skin 3 (three) times daily. 100 each 11    predniSONE (DELTASONE) 10 MG tablet 20 mg PO QD 12/3-1/2; " "15 mg PO QD 1/3-2/2; 10 mg PO QD 2/3-3/2; 5 mg thereafter 100 tablet 1    sodium bicarbonate 650 MG tablet Take 4 tablets BID  four hours before or after Harvoni 240 tablet 11    tacrolimus (PROGRAF) 1 MG Cap Take 3mg in the AM and 2mg in the PM by mouth. Z94.0 Kidney Transplant 150 capsule 11    tramadol (ULTRAM-ER) 100 MG Tb24 Take 100 mg by mouth as needed.      [DISCONTINUED] k phos di & mono-sod phos mono (K-PHOS-NEUTRAL) 250 mg Tab Take 2 tablets by mouth 3 (three) times daily. 180 tablet 11    atorvastatin (LIPITOR) 20 MG tablet Take 1 tablet (20 mg total) by mouth once daily. While on Harvoni. 30 tablet 11    atovaquone (MEPRON) 750 mg/5 mL Susp Take 10 mLs (1,500 mg total) by mouth once daily. 300 mL 7    BD INSULIN PEN NEEDLE UF SHORT 31 gauge x 5/16" Ndle       blood sugar diagnostic Strp 1 each by Misc.(Non-Drug; Combo Route) route 3 (three) times daily. 100 each 11    blood sugar diagnostic Strp 1 each by Misc.(Non-Drug; Combo Route) route 4 (four) times daily. 100 each 5    blood-glucose meter kit Use as instructed 1 each 0    budesonide-formoterol 160-4.5 mcg (SYMBICORT) 160-4.5 mcg/actuation HFAA Inhale 2 puffs into the lungs every 12 (twelve) hours. Wash out mouth after using 1 Inhaler 12    ERTAPENEM SODIUM (ERTAPENEM, INVANZ, 1 G/100 ML NS, READY TO MIX,) Inject 100 mLs (1 g total) into the vein once daily.      metoprolol tartrate (LOPRESSOR) 50 MG tablet Take 0.5 tablets (25 mg total) by mouth 2 (two) times daily. 30 tablet 11    pen needle, diabetic 31 gauge x 1/4" Ndle 1 each by Misc.(Non-Drug; Combo Route) route 4 (four) times daily. 100 each 0    [DISCONTINUED] albuterol-ipratropium 2.5mg-0.5mg/3mL (DUO-NEB) 0.5 mg-3 mg(2.5 mg base)/3 mL nebulizer solution Take 3 mLs by nebulization every 6 (six) hours. 120 vial 12    [DISCONTINUED] albuterol-ipratropium 2.5mg-0.5mg/3mL (DUO-NEB) 0.5 mg-3 mg(2.5 mg base)/3 mL nebulizer solution Take 3 mLs by nebulization every 6 " (six) hours. 120 vial 12    [DISCONTINUED] budesonide-formoterol 160-4.5 mcg (SYMBICORT) 160-4.5 mcg/actuation HFAA Inhale 2 puffs into the lungs every 12 (twelve) hours. Wash out mouth after using 1 Inhaler 12    [DISCONTINUED] lancets Misc 1 each by Misc.(Non-Drug; Combo Route) route 4 (four) times daily. 100 each 5    [DISCONTINUED] paroxetine (PAXIL) 10 MG tablet Take 1 tablet (10 mg total) by mouth every morning. 30 tablet 11     No facility-administered encounter medications on file as of 3/1/2017.      Orders Placed This Encounter   Procedures    X-Ray Chest PA And Lateral     Standing Status:   Future     Number of Occurrences:   1     Standing Expiration Date:   3/1/2018     Order Specific Question:   Reason for Exam:     Answer:   SOB    Complete PFT with bronchodilator     Standing Status:   Future     Standing Expiration Date:   3/1/2018     Plan:       Requested Prescriptions     Signed Prescriptions Disp Refills    budesonide-formoterol 160-4.5 mcg (SYMBICORT) 160-4.5 mcg/actuation HFAA 1 Inhaler 12     Sig: Inhale 2 puffs into the lungs every 12 (twelve) hours. Wash out mouth after using     SOB (shortness of breath)  -     X-Ray Chest PA And Lateral; Future  -     Complete PFT with bronchodilator; Future    Asthma with COPD  -     budesonide-formoterol 160-4.5 mcg (SYMBICORT) 160-4.5 mcg/actuation HFAA; Inhale 2 puffs into the lungs every 12 (twelve) hours. Wash out mouth after using  Dispense: 1 Inhaler; Refill: 12    Chronic obstructive pulmonary disease, unspecified COPD type  -     Discontinue: albuterol-ipratropium 2.5mg-0.5mg/3mL (DUO-NEB) 0.5 mg-3 mg(2.5 mg base)/3 mL nebulizer solution; Take 3 mLs by nebulization every 6 (six) hours.  Dispense: 120 vial; Refill: 12  -     Complete PFT with bronchodilator; Future           Return in about 6 months (around 9/1/2017) for PFT.    MEDICAL DECISION MAKING: Moderate to high complexity.  Overall, the multiple problems listed are of moderate to  high severity that may impact quality of life and activities of daily living. Side effects of medications, treatment plan as well as options and alternatives reviewed and discussed with patient. There was counseling of patient concerning these issues.    Total time spent in face to face counseling and coordination of care - 25  minutes over 50% of time was used in discussion of prognosis, risks, benefits of treatment, instructions and compliance with regimen . Discussion with other physicians or health care providers (DME, NP, pharmacy, respiratory therapy) occurred.

## 2017-03-01 NOTE — PATIENT INSTRUCTIONS
Budesonide; Formoterol Inhalation  What is this medicine?  BUDESONIDE; FORMOTEROL (byoo SELENA oh nide; for [facility]) inhalation is a combination of two medicines that decrease inflammation and help to open up the airways in your lungs. It is used to treat asthma. Do NOT use for an acute asthma attack.  How should I use this medicine?  This medicine is inhaled through the mouth. Rinse your mouth with water after use. Make sure not to swallow the water. Follow the directions on your prescription label. Do not use more often than directed. Do not stop taking except on your doctor's advice. Make sure that you are using your inhaler correctly. Ask your doctor or health care provider if you have any questions.  A special MedGuide will be given to you by the pharmacist with each prescription and refill. Be sure to read this information carefully each time.  Talk to your pediatrician regarding the use of this medicine in children. While this drug may be prescribed for children as young as 12 years of age for selected conditions, precautions do apply.  What side effects may I notice from receiving this medicine?  Side effects that you should report to your doctor or health care professional as soon as possible:  · allergic reactions such as skin rash or itching, hives, swelling of the face, lips or tongue  · breathing problems  · changes in vision  · chest pain  · fast, irregular heartbeat  · feeling faint or lightheaded, falls  · fever  · high blood pressure  · nervousness  · tremors  · white patches or sores in mouth  Side effects that usually do not require medical attention (report to your doctor or health care professional if they continue or are bothersome):  · cough  · different taste in mouth  · headache  · sore throat  · stuffy nose  · stomach upset  What may interact with this medicine?  Do not take this medicine with any of the following medications:  · MAOIs like Carbex, Eldepryl, Marplan, Nardil, and  Parnate  · mifepristone  · probucol  · procarbazine  · some other medicines for asthma like formoterol, salmeterol  This medicine may also interact with the following medications:  · antibiotics like clarithromycin, erythromycin  · cimetidine  · diuretics  · grapefruit juice  · itraconazole  · ketoconazole  · medicines for depression, anxiety, or psychotic disturbances  · medicines for irregular heartbeat  · methadone  · some heart medicines like atenolol, metoprolol  · some other medicines for breathing problems  · some vaccines  What if I miss a dose?  If you miss a dose, use it as soon as you remember. If it is almost time for your next dose, use only that dose and continue with your regular schedule, spacing doses evenly. Do not use double or extra doses.  Where should I keep my medicine?  Keep out of the reach of children.  Store in a dry place at room temperature between 20 and 25 degrees C (68 and 77 degrees F). Do not get the inhaler wet. Keep track of the number of doses used. Throw away the inhaler after using the marked number of inhalations or after the expiration date, whichever comes first. Do not burn or puncture canister.  What should I tell my health care provider before I take this medicine?  They need to know if you have any of these conditions:  · bone problems  · diabetes  · heart disease or irregular heartbeat  · high blood pressure  · immune system problems  · infection  · liver disease  · worsening asthma  · an unusual or allergic reaction to budesonide, formoterol, medicines, foods, dyes, or preservatives  · pregnant or trying to get pregnant  · breast-feeding  What should I watch for while using this medicine?  Tell your doctor or health care professional if your symptoms do not improve or get worse. If you need to use your short-acting inhalers more often, call your doctor right away. Do not use more than every 12 hours.  If you have asthma, be aware that using this medicine may increase  your risk of dying from asthma related problems. Talk to your doctor about the risks and benefits of taking this medicine. NEVER use this medicine for an acute asthma attack.  This medicine may increase your risk of getting an infection. Tell your doctor or health care professional if you are around anyone with measles or chickenpox, or if you develop sores or blisters that do not heal properly.  Date Last Reviewed:   NOTE:This sheet is a summary. It may not cover all possible information. If you have questions about this medicine, talk to your doctor, pharmacist, or health care provider. Copyright© 2016 [employer]        Albuterol inhalation aerosol  What is this medicine?  ALBUTEROL (al BYOO ter ole) is a bronchodilator. It helps open up the airways in your lungs to make it easier to breathe. This medicine is used to treat and to prevent bronchospasm.  How should I use this medicine?  This medicine is for inhalation through the mouth. Follow the directions on your prescription label. Take your medicine at regular intervals. Do not use more often than directed. Make sure that you are using your inhaler correctly. Ask you doctor or health care provider if you have any questions.  Talk to your pediatrician regarding the use of this medicine in children. Special care may be needed.  What side effects may I notice from receiving this medicine?  Side effects that you should report to your doctor or health care professional as soon as possible:  · allergic reactions like skin rash, itching or hives, swelling of the face, lips, or tongue  · breathing problems  · chest pain  · feeling faint or lightheaded, falls  · high blood pressure  · irregular heartbeat  · fever  · muscle cramps or weakness  · pain, tingling, numbness in the hands or feet  · vomiting  Side effects that usually do not require medical attention (report to your doctor or health care professional if they continue or are  bothersome):  · cough  · difficulty sleeping  · headache  · nervousness or trembling  · stomach upset  · stuffy or runny nose  · throat irritation  · unusual taste  What may interact with this medicine?  · anti-infectives like chloroquine and pentamidine  · caffeine  · cisapride  · diuretics  · medicines for colds  · medicines for depression or for emotional or psychotic conditions  · medicines for weight loss including some herbal products  · methadone  · some antibiotics like clarithromycin, erythromycin, levofloxacin, and linezolid  · some heart medicines  · steroid hormones like dexamethasone, cortisone, hydrocortisone  · theophylline  · thyroid hormones  What if I miss a dose?  If you miss a dose, use it as soon as you can. If it is almost time for your next dose, use only that dose. Do not use double or extra doses.  Where should I keep my medicine?  Keep out of the reach of children.  Store at room temperature between 15 and 30 degrees C (59 and 86 degrees F). The contents are under pressure and may burst when exposed to heat or flame. Do not freeze. This medicine does not work as well if it is too cold. Throw away any unused medicine after the expiration date. Inhalers need to be thrown away after the labeled number of puffs have been used or by the expiration date; whichever comes first. Ventolin HFA should be thrown away 12 months after removing from foil pouch. Check the instructions that come with your medicine.  What should I tell my health care provider before I take this medicine?  They need to know if you have any of the following conditions:  · diabetes  · heart disease or irregular heartbeat  · high blood pressure  · pheochromocytoma  · seizures  · thyroid disease  · an unusual or allergic reaction to albuterol, levalbuterol, sulfites, other medicines, foods, dyes, or preservatives  · pregnant or trying to get pregnant  · breast-feeding  What should I watch for while using this medicine?  Tell your  doctor or health care professional if your symptoms do not improve. Do not use extra albuterol. If your asthma or bronchitis gets worse while you are using this medicine, call your doctor right away.  If your mouth gets dry try chewing sugarless gum or sucking hard candy. Drink water as directed.  Date Last Reviewed:   NOTE:This sheet is a summary. It may not cover all possible information. If you have questions about this medicine, talk to your doctor, pharmacist, or health care provider. Copyright© 2016 Gold Standard        Albuterol inhalation solution  What is this medicine?  ALBUTEROL (al BYOO ter ole) is a bronchodilator. It helps to open up the airways in your lungs to make it easier to breathe. This medicine is used to treat and to prevent bronchospasm.  How should I use this medicine?  This medicine is used in a nebulizer. Nebulizers make a liquid into an aerosol that you breathe in through your mouth or your mouth and nose into your lungs. You will be taught how to use your nebulizer. Follow the directions on your prescription label. Take your medicine at regular intervals. Do not use more often than directed.  Talk to your pediatrician regarding the use of this medicine in children. Special care may be needed.  What side effects may I notice from receiving this medicine?  Side effects that you should report to your doctor or health care professional as soon as possible:  · allergic reactions like skin rash, itching or hives, swelling of the face, lips, or tongue  · breathing problems  · chest pain  · feeling faint or lightheaded, falls  · high blood pressure  · irregular heartbeat  · fever  · muscle cramps or weakness  · pain, tingling, numbness in the hands or feet  · vomiting  Side effects that usually do not require medical attention (report to your doctor or health care professional if they continue or are bothersome):  · cough  · difficulty sleeping  · headache  · nervousness, trembling  · stomach  upset  · stuffy or runny nose  · throat irritation  · unusual taste  What may interact with this medicine?  · anti-infectives like chloroquine and pentamidine  · caffeine  · cisapride  · diuretics  · medicines for colds  · medicines for depression or emotional or psychotic conditions  · medicines for weight loss including some herbal products  · methadone  · some antibiotics like clarithromycin, erythromycin, levofloxacin, and linezolid  · some heart medicines  · steroid hormones like dexamethasone, cortisone, hydrocortisone  · theophylline  · thyroid hormones  What if I miss a dose?  If you miss a dose, use it as soon as you can. If it is almost time for your next dose, use only that dose. Do not use double or extra doses.  Where should I keep my medicine?  Keep out of the reach of children.  Store between 2 and 25 degrees C (36 and 77 degrees F). Do not freeze. Protect from light. Throw away any unused medicine after the expiration date. Most products are kept in the foil package until time of use. Some products can be used up to 1 week after they are removed from the foil pouch. Check the instructions that come with your medicine.  What should I tell my health care provider before I take this medicine?  They need to know if you have any of the following conditions:  · diabetes  · heart disease or irregular heartbeat  · high blood pressure  · pheochromocytoma  · seizures  · thyroid disease  · an unusual or allergic reaction to albuterol, levalbuterol, sulfites, other medicines, foods, dyes, or preservatives  · pregnant or trying to get pregnant  · breast-feeding  What should I watch for while using this medicine?  Tell your doctor or health care professional if your symptoms do not improve. Do not use extra albuterol. Call your doctor right away if your asthma or bronchitis gets worse while you are using this medicine.  If your mouth gets dry try chewing sugarless gum or sucking hard candy. Drink water as  directed.  Date Last Reviewed:   NOTE:This sheet is a summary. It may not cover all possible information. If you have questions about this medicine, talk to your doctor, pharmacist, or health care provider. Copyright© 2016 Gold Standard

## 2017-03-02 ENCOUNTER — TELEPHONE (OUTPATIENT)
Dept: TRANSPLANT | Facility: CLINIC | Age: 62
End: 2017-03-02

## 2017-03-02 NOTE — TELEPHONE ENCOUNTER
Call placed, labs reviewed by Dr. Boston with no changes. Patient has stopped KPN. Will move to monthly labs. Id and Pulm. To continue f/u. Patient reports Dr. Caballero started him on Symbicort inhaler yesterday.

## 2017-03-07 ENCOUNTER — NUTRITION (OUTPATIENT)
Dept: DIABETES | Facility: CLINIC | Age: 62
End: 2017-03-07
Payer: MEDICARE

## 2017-03-07 VITALS
BODY MASS INDEX: 28.53 KG/M2 | SYSTOLIC BLOOD PRESSURE: 117 MMHG | WEIGHT: 199.31 LBS | HEIGHT: 70 IN | DIASTOLIC BLOOD PRESSURE: 79 MMHG

## 2017-03-07 DIAGNOSIS — E11.65 TYPE 2 DIABETES MELLITUS WITH HYPERGLYCEMIA, WITH LONG-TERM CURRENT USE OF INSULIN: Primary | ICD-10-CM

## 2017-03-07 DIAGNOSIS — Z89.511 S/P UNILATERAL BKA (BELOW KNEE AMPUTATION), RIGHT: ICD-10-CM

## 2017-03-07 DIAGNOSIS — E11.40 TYPE 2 DIABETES MELLITUS WITH DIABETIC NEUROPATHY, WITH LONG-TERM CURRENT USE OF INSULIN: ICD-10-CM

## 2017-03-07 DIAGNOSIS — E11.319 TYPE 2 DIABETES MELLITUS WITH RETINOPATHY, WITH LONG-TERM CURRENT USE OF INSULIN, MACULAR EDEMA PRESENCE UNSPECIFIED, UNSPECIFIED LATERALITY, UNSPECIFIED RETINOPATHY SEVERITY: ICD-10-CM

## 2017-03-07 DIAGNOSIS — Z79.4 TYPE 2 DIABETES MELLITUS WITH HYPERGLYCEMIA, WITH LONG-TERM CURRENT USE OF INSULIN: Primary | ICD-10-CM

## 2017-03-07 DIAGNOSIS — Z79.4 TYPE 2 DIABETES MELLITUS WITH RETINOPATHY, WITH LONG-TERM CURRENT USE OF INSULIN, MACULAR EDEMA PRESENCE UNSPECIFIED, UNSPECIFIED LATERALITY, UNSPECIFIED RETINOPATHY SEVERITY: ICD-10-CM

## 2017-03-07 DIAGNOSIS — Z79.4 TYPE 2 DIABETES MELLITUS WITH DIABETIC NEUROPATHY, WITH LONG-TERM CURRENT USE OF INSULIN: ICD-10-CM

## 2017-03-07 PROCEDURE — G0108 DIAB MANAGE TRN  PER INDIV: HCPCS | Mod: PBBFAC,PO | Performed by: DIETITIAN, REGISTERED

## 2017-03-07 PROCEDURE — 99212 OFFICE O/P EST SF 10 MIN: CPT | Mod: PBBFAC,PO | Performed by: DIETITIAN, REGISTERED

## 2017-03-07 PROCEDURE — 99999 PR PBB SHADOW E&M-EST. PATIENT-LVL II: CPT | Mod: PBBFAC,,, | Performed by: DIETITIAN, REGISTERED

## 2017-03-07 NOTE — PROGRESS NOTES
Diabetes Education  Author: Dana Morillo RD, CDE  Date: 3/7/2017    Diabetes Education Visit  Diabetes Education Record Assessment/Progress: Initial (FU diabetes, carb counting to proceed to insulin pump therapy.)    Diabetes Type  Diabetes Type : Type II (LABS: Reviewed Diabetes Management Flowsheet and Results Reviewed.HEALTH MAINTENANCE: Reviewed and Reconciled; Eye exam:  Dr. Ramos - 3/16 (pt desires ehsan at [facility]); Podiatry exam:  7/16; Dental exam: Recommend regular exams; denies gums bleeding.)    Diabetes History  Diabetes Diagnosis: 5-10 years (2009)    Nutrition  Meal Planning: eats out often  Meal Plan 24 Hour Recall - Breakfast: excess carb, fat, sodium from fast foods     Monitoring   Monitoring: Other  Self Monitoring : Pt not testing BG. Patient denies polyuria, polydipsia, polyphagia, blurred vision, nausea, vomiting, diarrhea, and hypoglycemia  Blood Glucose Logs: No    Exercise   Intensity: Low  Frequency:  (yard work twice per week)    Current Diabetes Treatment   Current Treatment: Diet, Exercise, Insulin (Patient currently takes NPH insulin 20 units am, 12 units pm; regular insulin 4-6 units ac. No smart charts, BG glucometer for review. Pt admits to running out of NPH insulin 3 days ago w/out calling pharm or clinic for refill. Not taking Regular insulin)    Social History  Preferred Learning Method: Face to Face  Primary Support: Self, Family  Occupation: Lives alone. Retired from construction and various jobs, now retired.   Smoking Status: Ex Smoker  Alcohol Use: Rarely                   Barriers to Change  Barriers to Change: None  Learning Challenges : None    Readiness to Learn   Readiness to Learn : Non Acceptance    Cultural Influences  Cultural Influences: No    Diabetes Education Assessment/Progress  Acute Complications (preventing, detecting, and treating acute complications): Discussion, Competent (verbalizes/demonstrates), Individual Session  Chronic Complications  (preventing, detecting, and treating chronic complications): Discussion, Competent (verbalizes/demonstrates), Individual Session  Diabetes Disease Process (diabetes disease process and treatment options): Discussion, Competent (verbalizes/demonstrates), Individual Session  Nutrition (Incorporating nutritional management into one's lifestyle): Discussion, Competent (verbalizes/demonstrates), Individual Session, Written Materials Provided  Physical Activity (incorporating physical activity into one's lifestyle): Discussion, Competent (verbalizes/demonstrates), Individual Session  Medications (states correct name, dose, onset, peak, duration, side effects & timing of meds): Discussion, Competent (verbalizes/demonstrates), Requires Assistance, Individual Session, Written Materials Provided  Monitoring (monitoring blood glucose/other parameters & using results): Discussion, Competent (verbalizes/demonstrates), Individual Session, Written Materials Provided  Goal Setting and Problem Solving (verbalizes behavior change strategies & sets realistic goals): Discussion, Competent (verbalizes/demonstrates), Requires Assistance, Individual Session, Written Materials Provided  Behavior Change (developing personal strategies to health & behavior change): Discussion, Comnpetent (verbalizes/demonstrates), Individual Session, Requires Assistance  Psychosocial Issues (developing personal srategies to address psychosocial concerns): Discussion, Competent (verbalizes/demonstrates), Individual Session    Goals  Monitoring: Set  Start Date: 03/07/17 (test BG as directed - fasting, ac 4-6x/d.)  Medications: Set  Start Date: 03/07/17 (take medications as directed using written materials provided.)  Target Date: 06/07/17         Diabetes Care Plan/Intervention  Education Plan/Intervention: Individual Follow-Up DSMT, Endocrine Provider Visit Set Up, Eye Exam (Instructed to test glucose 4-6 x/d - fasting, ac.  Inbsk msg sent to [facility] to  assist w/ Rx refills. Written medication instruction provided to pt. Smart charts provided to pt.)    Diabetes Meal Plan  Restrictions:  (MNT ASSESSMENT: 9091-2032 calories; 6 ounces protein daily; 45-60 grams carb/meal; 5-15 grams carb/snack; low-fat, low-sodium, low-potassium, low-phosphorus; 150 min physical activity per week, moderate intensity, as tolerated)  Fat:      Education Units of Time   Time Spent: 30 min              Health Maintenance Due   Topic Date Due    Pneumococcal PCV13 (High Risk) (1 - PCV13 Required) 07/25/1956    Eye Exam  07/25/1963    TETANUS VACCINE  07/25/1971    Pneumococcal PPSV23 (High Risk) (1) 07/25/1971    Zoster Vaccine  07/25/2013    Influenza Vaccine  08/01/2016

## 2017-03-07 NOTE — MR AVS SNAPSHOT
[facility]  [address] [place] [address]  Phone: [phone]  Fax: [phone]                  Lavelle Ladd   3/7/2017 8:30 AM   Nutrition    Description:  Male : 1955   Provider:  Dana Morillo RD, CDE   Department:  [facility] - Diabetes Management           Reason for Visit     Follow-up           Diagnoses this Visit        Comments    Type 2 diabetes mellitus with hyperglycemia, with long-term current use of insulin    -  Primary     S/P unilateral BKA (below knee amputation), right                To Do List           Future Appointments        Provider Department Dept Phone    3/13/2017 1:45 PM Jaciel Malhotra OD [facility] - Ophthalmology [phone]    3/14/2017 11:00 AM Amira Lynn Jr., PAGladisC [facility] [phone]    3/16/2017 9:40 AM Santosh Ivory MD [facility] Cardiology [phone]    3/20/2017 9:20 AM LABORATORY, [facility] [phone]    4/3/2017 9:50 AM LABORATORY, [facility] [phone]      Goals (5 Years of Data)     None      Follow-Up and Disposition     Return in about 6 weeks (around 2017), or [facility] (Asap). NP appt for Eye exam. [facility] mid am/RD 6wks; E11.9 [place] .      [facility]     [facility] Care [facility] -  Assistance  Registered nurses in the [facility] provide clinical advisement, health education, appointment booking, and other advisory services.  Call for this free service at [phone].             Medications           Message regarding Medications     Verify the changes and/or additions to your medication regime listed below are the same as discussed with your clinician today.  If any of these changes or additions are incorrect, please notify your healthcare provider.             Verify that the below list of medications is an accurate representation of the medications you are currently taking.  If none reported, the  "list may be blank. If incorrect, please contact your healthcare provider. Carry this list with you in case of emergency.           Current Medications     albuterol-ipratropium 2.5mg-0.5mg/3mL (DUO-NEB) 0.5 mg-3 mg(2.5 mg base)/3 mL nebulizer solution Take 3 mLs by nebulization every 6 (six) hours.    aspirin (ECOTRIN) 81 MG EC tablet Take 1 tablet (81 mg total) by mouth once daily.    atorvastatin (LIPITOR) 20 MG tablet Take 1 tablet (20 mg total) by mouth once daily. While on Harvoni.    atovaquone (MEPRON) 750 mg/5 mL Susp Take 10 mLs (1,500 mg total) by mouth once daily.    BD INSULIN PEN NEEDLE UF SHORT 31 gauge x 5/16" Ndle     blood sugar diagnostic Strp 1 each by Misc.(Non-Drug; Combo Route) route 3 (three) times daily.    blood sugar diagnostic Strp 1 each by Misc.(Non-Drug; Combo Route) route 4 (four) times daily.    blood-glucose meter kit Use as instructed    budesonide-formoterol 160-4.5 mcg (SYMBICORT) 160-4.5 mcg/actuation HFAA Inhale 2 puffs into the lungs every 12 (twelve) hours. Wash out mouth after using    ergocalciferol (ERGOCALCIFEROL) 50,000 unit Cap Take 1 capsule (50,000 Units total) by mouth every 7 days. Take on Mondays    ERTAPENEM SODIUM (ERTAPENEM, INVANZ, 1 G/100 ML NS, READY TO MIX,) Inject 100 mLs (1 g total) into the vein once daily.    famotidine (PEPCID) 20 MG tablet Take 1 tablet (20 mg total) by mouth every evening.    inhalation device (BREATHERITE VALVED MDI CHAMBER) Use as directed for inhalation.    insulin NPH (NOVOLIN N) 100 unit/mL injection Take 20 units subq with breakfast and 8 units at bedtime    lancets Misc 1 each by Misc.(Non-Drug; Combo Route) route 3 (three) times daily.    magnesium oxide (MAG-OX) 400 mg tablet Take 1 tablet (400 mg total) by mouth 2 (two) times daily.    metoprolol tartrate (LOPRESSOR) 50 MG tablet Take 0.5 tablets (25 mg total) by mouth 2 (two) times daily.    multivitamin (THERAGRAN) tablet Take 1 tablet by mouth once daily.    mycophenolate " "(CELLCEPT) 250 mg Cap Take 2 capsules (500 mg total) by mouth 2 (two) times daily. Z94.0 Kidney Transplant 5/21/2016.    olanzapine (ZYPREXA) 5 MG tablet Take 1 tablet (5 mg total) by mouth every evening.    ondansetron (ZOFRAN-ODT) 4 MG TbDL Take 2 tablets (8 mg total) by mouth every 8 (eight) hours as needed (nausea).    oxycodone (ROXICODONE) 15 MG Tab Take 1 tablet (15 mg total) by mouth every 6 (six) hours as needed for Pain.    pen needle, diabetic (EASY COMFORT PEN NEEDLES) 32 gauge x 5/32" Ndle Inject 1 each into the skin 3 (three) times daily.    pen needle, diabetic 31 gauge x 1/4" Ndle 1 each by Misc.(Non-Drug; Combo Route) route 4 (four) times daily.    predniSONE (DELTASONE) 10 MG tablet 20 mg PO QD 12/3-1/2; 15 mg PO QD 1/3-2/2; 10 mg PO QD 2/3-3/2; 5 mg thereafter    sodium bicarbonate 650 MG tablet Take 4 tablets BID  four hours before or after Harvoni    tacrolimus (PROGRAF) 1 MG Cap Take 3mg in the AM and 2mg in the PM by mouth. Z94.0 Kidney Transplant    tramadol (ULTRAM-ER) 100 MG Tb24 Take 100 mg by mouth as needed.           Clinical Reference Information           Your Vitals Were     BP Height Weight BMI       117/79 (BP Location: Right arm, Patient Position: Sitting, BP Method: Manual) 5' 10" (1.778 m) 90.4 kg (199 lb 4.7 oz) 28.6 kg/m2       Allergies as of 3/7/2017     Tylenol [Acetaminophen]      Immunizations Administered on Date of Encounter - 3/7/2017     None      Maintenance Dialysis History     Start End Type Comments Center    10/30/2013 5/21/2016 Hemo  [facility]            Current Dialysis Center Information     No center information to display.            Transplant Information        Txp Date Organ Coordinator Care Team    5/21/2016 Kidney Rosita Newton RN Referring Physician:  Avel Estrada MD   Current Nephrologist:  Avel Estrada MD   Surgeon:  Ronny Bergeron MD   Assisting Surgeon:  Terrell Navarrete MD   Resident:  Jose David Rowley MD       "   Language Assistance Services     ATTENTION: Language assistance services are available, free of charge. Please call [phone].      ATENCIÓN: Si habla artemio, tiene a hollis disposición servicios gratuitos de asistencia lingüística. Llame al [phone].     CHÚ Ý: N?u b?n nói Ti?ng Vi?t, có các d?ch v? h? tr? ngôn ng? mi?n phí dành cho b?n. G?i s? [phone].         [facility] complies with applicable Federal civil rights laws and does not discriminate on the basis of race, color, national origin, age, disability, or sex.

## 2017-03-08 ENCOUNTER — TELEPHONE (OUTPATIENT)
Dept: DIABETES | Facility: CLINIC | Age: 62
End: 2017-03-08

## 2017-03-08 NOTE — TELEPHONE ENCOUNTER
----- Message from Allison Coleman sent at 3/8/2017  8:33 AM CST -----  Contact: [facility]  Sharon called to clarify the directions for:    1. [id number] [place] - [address] [address]  [address] [place] [address]  Phone: [phone] Fax: [phone]    Allison Rodríguez

## 2017-03-13 ENCOUNTER — OFFICE VISIT (OUTPATIENT)
Dept: OPHTHALMOLOGY | Facility: CLINIC | Age: 62
End: 2017-03-13
Payer: MEDICARE

## 2017-03-13 DIAGNOSIS — E11.3299 BDR (BACKGROUND DIABETIC RETINOPATHY): Primary | ICD-10-CM

## 2017-03-13 DIAGNOSIS — E11.9 ENCOUNTER FOR EYE EXAM IN PATIENT WITH TYPE 2 DIABETES MELLITUS: ICD-10-CM

## 2017-03-13 DIAGNOSIS — H52.4 BILATERAL PRESBYOPIA: ICD-10-CM

## 2017-03-13 DIAGNOSIS — Z01.00 ENCOUNTER FOR EYE EXAM IN PATIENT WITH TYPE 2 DIABETES MELLITUS: ICD-10-CM

## 2017-03-13 PROCEDURE — 92004 COMPRE OPH EXAM NEW PT 1/>: CPT | Mod: S$PBB,,, | Performed by: OPTOMETRIST

## 2017-03-13 PROCEDURE — 92015 DETERMINE REFRACTIVE STATE: CPT | Mod: ,,, | Performed by: OPTOMETRIST

## 2017-03-13 NOTE — LETTER
March 13, 2017      Dana Morillo RD, CDE  [address] [facility] [address]           [facility]  [address] [place] [address]  Phone: [phone]  Fax: [phone]          Patient: Lavelle Ladd   MR Number: [id number]   YOB: 1955   Date of Visit: 3/13/2017       Dear Dana Morillo:    Thank you for referring Lavelle Ladd to me for evaluation. Attached you will find relevant portions of my assessment and plan of care.    If you have questions, please do not hesitate to call me. I look forward to following Lavelle Ladd along with you.    Sincerely,    Jaciel Malhotra, OD    Enclosure  CC:  No Recipients    If you would like to receive this communication electronically, please contact [email or web address] or [phone] to request more information on [employer] access.    For providers and/or their staff who would like to refer a patient to [facility], please contact us through our one-stop-shop provider referral line, Alec Monsivais, at [phone].    If you feel you have received this communication in error or would no longer like to receive these types of communications, please e-mail [email or web address]

## 2017-03-14 ENCOUNTER — OFFICE VISIT (OUTPATIENT)
Dept: DIABETES | Facility: CLINIC | Age: 62
End: 2017-03-14
Payer: MEDICARE

## 2017-03-14 VITALS
WEIGHT: 201.94 LBS | BODY MASS INDEX: 28.91 KG/M2 | HEIGHT: 70 IN | SYSTOLIC BLOOD PRESSURE: 166 MMHG | DIASTOLIC BLOOD PRESSURE: 88 MMHG

## 2017-03-14 DIAGNOSIS — E11.65 TYPE 2 DIABETES MELLITUS WITH HYPERGLYCEMIA, WITH LONG-TERM CURRENT USE OF INSULIN: Primary | ICD-10-CM

## 2017-03-14 DIAGNOSIS — Z79.4 TYPE 2 DIABETES MELLITUS WITH HYPERGLYCEMIA, WITH LONG-TERM CURRENT USE OF INSULIN: Primary | ICD-10-CM

## 2017-03-14 LAB — GLUCOSE SERPL-MCNC: 228 MG/DL (ref 70–110)

## 2017-03-14 PROCEDURE — 99214 OFFICE O/P EST MOD 30 MIN: CPT | Mod: PBBFAC,PO | Performed by: PHYSICIAN ASSISTANT

## 2017-03-14 PROCEDURE — 99214 OFFICE O/P EST MOD 30 MIN: CPT | Mod: S$PBB,,, | Performed by: PHYSICIAN ASSISTANT

## 2017-03-14 PROCEDURE — 82948 REAGENT STRIP/BLOOD GLUCOSE: CPT | Mod: PBBFAC,PO | Performed by: PHYSICIAN ASSISTANT

## 2017-03-14 PROCEDURE — 99999 PR PBB SHADOW E&M-EST. PATIENT-LVL IV: CPT | Mod: PBBFAC,,, | Performed by: PHYSICIAN ASSISTANT

## 2017-03-14 NOTE — MR AVS SNAPSHOT
[facility]  [address] [facility] [place] [address]  Phone: [phone]  Fax: [phone]                  Lavelle Ladd   3/14/2017 11:00 AM   Office Visit    Description:  Male : 1955   Provider:  Amira Lynn Jr., PA-C   Department:  [facility]           Reason for Visit     Diabetes Mellitus           Diagnoses this Visit        Comments    Type 2 diabetes mellitus with hyperglycemia, with long-term current use of insulin    -  Primary            To Do List           Future Appointments        Provider Department Dept Phone    3/16/2017 9:40 AM Santosh Ivory MD [facility] [phone]    3/20/2017 9:20 AM LABORATORY, [facility] [phone]    3/24/2017 9:45 AM STEPHANIE Ruiz MD [facility] [phone]    4/3/2017 9:50 AM LAB[facility]ATORY, [facility] [phone]    2017 8:35 AM LAB[facility]ATORY, [facility] [phone]      Goals (5 Years of Data)     None      Follow-Up and Disposition     Return in about 3 months (around 2017), or if symptoms worsen or fail to improve.       These Medications        Disp Refills Start End    insulin regular (NOVOLIN R) 100 unit/mL Inj injection 10 mL 11 3/14/2017     Inject 6 units with breakfast and 8 units with dinner, subcutaneously 30 min before meal.    Pharmacy: [id number] - [place] - [address] [facility] Ph #: [phone]       insulin NPH (NOVOLIN N) 100 unit/mL injection 1 vial 11 3/14/2017     Take 25 units subq with breakfast and 15 units at bedtime    Pharmacy: [id number] - [place] - [address] [facility] Ph #: [phone]         [facility]     [facility] On Call Nurse Care Line -  Assistance  Registered nurses in the [facility] Center provide clinical advisement, health education, appointment booking, and other advisory services.  Call for this free service at  "[phone].             Medications           Message regarding Medications     Verify the changes and/or additions to your medication regime listed below are the same as discussed with your clinician today.  If any of these changes or additions are incorrect, please notify your healthcare provider.        CHANGE how you are taking these medications     Start Taking Instead of    insulin regular (NOVOLIN R) 100 unit/mL Inj injection insulin regular (NOVOLIN R) 100 unit/mL Inj injection    Dosage:  Inject 6 units with breakfast and 8 units with dinner, subcutaneously 30 min before meal. Dosage:  Inject 4 units with breakfast and 6 units with dinner, subcutaneously 30 min before meal.    Reason for Change:  Reorder     insulin NPH (NOVOLIN N) 100 unit/mL injection insulin NPH (NOVOLIN N) 100 unit/mL injection    Dosage:  Take 25 units subq with breakfast and 15 units at bedtime Dosage:  Take 20 units subq with breakfast and 10 units at bedtime    Reason for Change:  Reorder            Verify that the below list of medications is an accurate representation of the medications you are currently taking.  If none reported, the list may be blank. If incorrect, please contact your healthcare provider. Carry this list with you in case of emergency.           Current Medications     albuterol-ipratropium 2.5mg-0.5mg/3mL (DUO-NEB) 0.5 mg-3 mg(2.5 mg base)/3 mL nebulizer solution Take 3 mLs by nebulization every 6 (six) hours.    aspirin (ECOTRIN) 81 MG EC tablet Take 1 tablet (81 mg total) by mouth once daily.    atorvastatin (LIPITOR) 20 MG tablet Take 1 tablet (20 mg total) by mouth once daily. While on Harvoni.    BD INSULIN PEN NEEDLE UF SHORT 31 gauge x 5/16" Ndle     blood sugar diagnostic Strp 1 each by Misc.(Non-Drug; Combo Route) route 3 (three) times daily.    blood sugar diagnostic Strp 1 each by Misc.(Non-Drug; Combo Route) route 4 (four) times daily.    blood-glucose meter kit Use as instructed    " "budesonide-formoterol 160-4.5 mcg (SYMBICORT) 160-4.5 mcg/actuation HFAA Inhale 2 puffs into the lungs every 12 (twelve) hours. Wash out mouth after using    ergocalciferol (ERGOCALCIFEROL) 50,000 unit Cap Take 1 capsule (50,000 Units total) by mouth every 7 days. Take on Mondays    ERTAPENEM SODIUM (ERTAPENEM, INVANZ, 1 G/100 ML NS, READY TO MIX,) Inject 100 mLs (1 g total) into the vein once daily.    inhalation device (BREATHERITE VALVED MDI CHAMBER) Use as directed for inhalation.    insulin NPH (NOVOLIN N) 100 unit/mL injection Take 25 units subq with breakfast and 15 units at bedtime    insulin regular (NOVOLIN R) 100 unit/mL Inj injection Inject 6 units with breakfast and 8 units with dinner, subcutaneously 30 min before meal.    lancets Misc 1 each by Misc.(Non-Drug; Combo Route) route 3 (three) times daily.    magnesium oxide (MAG-OX) 400 mg tablet Take 1 tablet (400 mg total) by mouth 2 (two) times daily.    metoprolol tartrate (LOPRESSOR) 50 MG tablet Take 0.5 tablets (25 mg total) by mouth 2 (two) times daily.    multivitamin (THERAGRAN) tablet Take 1 tablet by mouth once daily.    mycophenolate (CELLCEPT) 250 mg Cap Take 2 capsules (500 mg total) by mouth 2 (two) times daily. Z94.0 Kidney Transplant 5/21/2016.    olanzapine (ZYPREXA) 5 MG tablet Take 1 tablet (5 mg total) by mouth every evening.    ondansetron (ZOFRAN-ODT) 4 MG TbDL Take 2 tablets (8 mg total) by mouth every 8 (eight) hours as needed (nausea).    oxycodone (ROXICODONE) 15 MG Tab Take 1 tablet (15 mg total) by mouth every 6 (six) hours as needed for Pain.    pen needle, diabetic (EASY COMFORT PEN NEEDLES) 32 gauge x 5/32" Ndle Inject 1 each into the skin 3 (three) times daily.    pen needle, diabetic 31 gauge x 1/4" Ndle 1 each by Misc.(Non-Drug; Combo Route) route 4 (four) times daily.    predniSONE (DELTASONE) 10 MG tablet 20 mg PO QD 12/3-1/2; 15 mg PO QD 1/3-2/2; 10 mg PO QD 2/3-3/2; 5 mg thereafter    sodium bicarbonate 650 MG " "tablet Take 4 tablets BID  four hours before or after Harvoni    tacrolimus (PROGRAF) 1 MG Cap Take 3mg in the AM and 2mg in the PM by mouth. Z94.0 Kidney Transplant    tramadol (ULTRAM-ER) 100 MG Tb24 Take 100 mg by mouth as needed.    atovaquone (MEPRON) 750 mg/5 mL Susp Take 10 mLs (1,500 mg total) by mouth once daily.    famotidine (PEPCID) 20 MG tablet Take 1 tablet (20 mg total) by mouth every evening.           Clinical Reference Information           Your Vitals Were     BP Height Weight BMI       166/88 (BP Location: Right arm, Patient Position: Sitting, BP Method: Manual) 5' 10" (1.778 m) 91.6 kg (201 lb 15.1 oz) 28.98 kg/m2       Blood Pressure          Most Recent Value    BP  (!)  166/88      Allergies as of 3/14/2017     Tylenol [Acetaminophen]      Immunizations Administered on Date of Encounter - 3/14/2017     None      Orders Placed During Today's Visit      Normal Orders This Visit    POCT glucose     Future Labs/Procedures Expected by Expires    C-peptide  3/14/2017 5/13/2018    Glucose, fasting  3/14/2017 5/13/2018    Glutamic acid decarboxylase  3/14/2017 5/13/2018    Hemoglobin A1c  3/14/2017 5/13/2018         3/14/2017 11:12 AM - Nicol Cuevas LPN      Component Results     Component Value Flag Ref Range Units Status    POC Glucose 228 (A) 70 - 110 mg/dL Final            Maintenance Dialysis History     Start End Type Comments Center    10/30/2013 5/21/2016 Hemo  [facility]            Current Dialysis Center Information     No center information to display.            Transplant Information        Txp Date Organ Coordinator Care Team    5/21/2016 Kidney Rosita Newton RN Referring Physician:  Avel Estrada MD   Current Nephrologist:  Avel Estrada MD   Surgeon:  Ronny Bergeron MD   Assisting Surgeon:  Terrell Navarrete MD   Resident:  Jose David Rowley MD         Language Assistance Services     ATTENTION: Language assistance services are available, free of " charge. Please call [phone].      ATENCIÓN: Si habla español, tiene a hollis disposición servicios gratuitos de asistencia lingüística. Llame al [phone].     CHÚ Ý: N?u b?n nói Ti?ng Vi?t, có các d?ch v? h? tr? ngôn ng? mi?n phí dành cho b?n. G?i s? [phone].         [facility] complies with applicable Federal civil rights laws and does not discriminate on the basis of race, color, national origin, age, disability, or sex.

## 2017-03-15 DIAGNOSIS — Z79.4 TYPE 2 DIABETES MELLITUS WITH HYPERGLYCEMIA, WITH LONG-TERM CURRENT USE OF INSULIN: Primary | ICD-10-CM

## 2017-03-15 DIAGNOSIS — E11.65 TYPE 2 DIABETES MELLITUS WITH HYPERGLYCEMIA, WITH LONG-TERM CURRENT USE OF INSULIN: Primary | ICD-10-CM

## 2017-03-15 RX ORDER — PEN NEEDLE, DIABETIC 29 G X1/2"
1 NEEDLE, DISPOSABLE MISCELLANEOUS 4 TIMES DAILY
Qty: 100 EACH | Refills: 11 | OUTPATIENT
Start: 2017-03-15

## 2017-03-15 NOTE — TELEPHONE ENCOUNTER
----- Message from Concha Marline sent at 3/15/2017  9:55 AM CDT -----  Pt at [phone]//states he is needing a refill of his insulin syringes//uses [facility] on [facility]//please call when sent in//key/tacos

## 2017-03-15 NOTE — PROGRESS NOTES
Subjective:      Patient ID: Lavelle Ladd is a 61 y.o. male.    PCP: Rishabh Esteban MD      Lavelle Ladd is a pleasant 61 y.o. male presenting to follow up on diabetes mellitus. He has had diabetes for 8 or more years. His last visit in Diabetes Management was 7/29/2016. Since that time he has had mild improvement in his symptoms. His blood sugar range fasting has been  and 2 hour post meal has been not taken, and he has been monitoring 1 times per day as directed. His glucometer down load reveals he has had 26 data points between 2/13/17 - 3/14/17; 4 of which were before meals. His average blood glucose is 201 mg/dl and his average reading per day is 0.9.  He is within the target range (ITR) 50%; above target range (ATR) 50%; and below target range (BTR) 0%; his standard deviation is 73.  His current concerns are request insulin pump.    He denies any hospital admissions, emergency room visits, hypoglycemia, syncope, diaphoresis, chest pain, or dyspnea.    He has gained 2 pounds since last visit. His BMI is 28.98    His blood sugar in the clinic today was:   Lab Results   Component Value Date    POCGLU 228 (A) 03/14/2017       We discussed the American diabetes Association recommendations:  hemoglobin A1c below 7.0%; all diabetics should be on statins unless contraindicated; one aspirin daily unless contraindicated; fasting blood sugar between 80 and 130 mg/dL; postprandial blood sugar below 180 mg/dl; prevention of hypoglycemia, may adjust goals to higher levels if persistent; ACE or ARB therapy if not contraindicated; and maintain in an ideal body weight with BMI below 25.    Lavelle is compliant most of the time with DM medications.     Lavelle is compliant most of the time with lifestyle modifications to include activity and meal planning.       STANDARDS OF CARE:  Eye doctor: Dr. AUREA Malhotra, last exam 3/13/2017.  Dental exam: Recommend regular exams; denies gums bleeding.  Podiatry  doctor:    ACTIVITY LEVEL: He exercises never.  MEAL PLANNING: Number of meals per day - 3. Number of snacks per day - 2.  Per dietary recall, patient states he is now limiting carbohydrates, saturated fats and sodium. This is working well for him and he is in the early stages and still learning. But his last 7 days blood sugar log is showing a decline and better glycemic control.  BLOOD GLUCOSE TESTING: Self-monitoring with One touch Verio  SOCIAL HISTORY: single. Lives alone. on permanent disability no tobacco use    The following results were reviewed with patient.    Lab Results   Component Value Date    WBC 2.88 (L) 02/27/2017    HGB 13.5 (L) 02/27/2017    HCT 41.1 02/27/2017     02/27/2017    CHOL 132 09/09/2016    TRIG 130 09/09/2016    HDL 48 09/09/2016    ALT 22 02/13/2017    AST 19 02/13/2017     02/27/2017    K 4.2 02/27/2017     02/27/2017    CREATININE 1.7 (H) 02/27/2017    ESTGFRAFRICA 48.5 (A) 02/27/2017    EGFRNONAA 42.0 (A) 02/27/2017    BUN 13 02/27/2017    CO2 21 (L) 02/27/2017    TSH 1.417 08/01/2016    PSA 0.29 08/25/2015    INR 1.0 11/17/2016     02/27/2017       Lab Results   Component Value Date    HGBA1C 10.5 (H) 12/01/2016    HGBA1C 11.4 (H) 09/24/2016    HGBA1C 7.4 (H) 07/16/2016       Lab Results   Component Value Date    CPEPTIDE 5.7 (H) 08/01/2016     Lab Results   Component Value Date    TSH 1.417 08/01/2016     Lab Results   Component Value Date    IRON 41 (L) 10/30/2013    TIBC 306 10/30/2013    FERRITIN 132 02/26/2013       Lab Results   Component Value Date    BGPONTIL03 192 (L) 02/26/2013       Lab Results   Component Value Date    .0 (H) 09/19/2016    CALCIUM 9.4 02/27/2017    CAION 1.10 05/30/2016    PHOS 3.8 02/27/2017           Review of patient's allergies indicates:   Allergen Reactions    Tylenol [acetaminophen]      Told not to take per Transplant Team       Past Medical History:   Diagnosis Date    DINORAH (acute kidney injury) 9/25/2016     Arthritis     Chronic obstructive pulmonary disease 2016    Coronary artery disease involving native coronary artery of native heart without angina pectoris 2016     donor kidney transplant for DM 16     Induction with Thymo x3 and IV solumedrol to total 875mg  Kidney Biopsy  2016: 16 glomeruli, ACR type 1 AVR type 2, significant microcirculatory changes, c4d negative, No DSA, 5 to10% fibrosis. Treated with thymo x8 2016- no rejection      Diastolic heart failure     DM2 (diabetes mellitus, type 2)     Encounter for blood transfusion     ESRD on RRT since 10/2013 10/29/2013    Biopsy proven diabetic nephropathy and lymphoplasmacytic interstitial infiltrate not c/w with AIN (ddx sjogrens or assoc with tamm-horsefall protein extravasation)     GERD (gastroesophageal reflux disease)     Hep C w/o coma, chronic     Hyperlipidemia     Hypertension     Prophylactic immunotherapy     Proteinuria     Reactive airway disease     Renal hypertension     Type 2 diabetes mellitus with diabetic neuropathy, with long-term current use of insulin 2016    Type 2 diabetes mellitus with hyperglycemia, with long-term current use of insulin     Type 2 diabetes mellitus with retinopathy, with long-term current use of insulin 2016    Vitamin B12 deficiency        Review of Systems   Constitutional: Positive for activity change and fatigue. Negative for appetite change, chills, diaphoresis, fever and unexpected weight change.   HENT: Negative.  Negative for dental problem, facial swelling, hearing loss, nosebleeds, trouble swallowing and voice change.    Eyes: Positive for visual disturbance. Negative for photophobia, pain, discharge, redness and itching.   Respiratory: Positive for shortness of breath (with prolong walking). Negative for cough, choking, chest tightness and wheezing.    Cardiovascular: Negative.  Negative for chest pain, palpitations and leg swelling.  "  Gastrointestinal: Negative.  Negative for abdominal distention, abdominal pain, blood in stool, constipation, diarrhea, nausea and vomiting.   Endocrine: Negative.  Negative for cold intolerance, heat intolerance, polydipsia, polyphagia and polyuria.   Genitourinary: Negative.  Negative for decreased urine volume, difficulty urinating, dysuria, frequency, scrotal swelling, testicular pain and urgency.   Musculoskeletal: Positive for gait problem (Below the knee amputation of the right knee and abrasions of the medial ankle from his foot wear, suggest he follow up with podiatry). Negative for arthralgias, back pain, joint swelling, myalgias, neck pain and neck stiffness.   Skin: Negative.  Negative for color change, pallor, rash and wound.   Allergic/Immunologic: Negative.  Negative for environmental allergies, food allergies and immunocompromised state.   Neurological: Negative for dizziness, tremors, seizures, syncope, facial asymmetry, speech difficulty, weakness, light-headedness, numbness and headaches.   Hematological: Negative.  Negative for adenopathy. Does not bruise/bleed easily.   Psychiatric/Behavioral: Negative.  Negative for agitation, behavioral problems, confusion, decreased concentration, dysphoric mood, hallucinations, self-injury, sleep disturbance and suicidal ideas. The patient is not nervous/anxious and is not hyperactive.       Objective:     Vitals - 1 value per visit 3/1/2017 3/7/2017 3/14/2017   SYSTOLIC 124 117 166   DIASTOLIC 76 79 88   PULSE 72 - -   TEMPERATURE - - -   RESPIRATIONS - - -   SPO2 98 - -   Weight (lb) 208.56 199.3 201.94   HEIGHT 5' 10" 5' 10" 5' 10"   BODY MASS INDEX 29.92 28.6 28.98   VISIT REPORT - - -   Pain Score  6 - -       Physical Exam   Constitutional: He is oriented to person, place, and time. He appears well-developed and well-nourished. He is cooperative.  Non-toxic appearance. He does not have a sickly appearance. He does not appear ill. No distress. He is " not intubated.   HENT:   Head: Normocephalic and atraumatic. Not macrocephalic and not microcephalic. Head is without raccoon's eyes, without Li's sign, without abrasion, without contusion, without laceration, without right periorbital erythema and without left periorbital erythema. Hair is normal.   Right Ear: External ear normal. No lacerations. No drainage, swelling or tenderness. No foreign bodies. No mastoid tenderness. Tympanic membrane is not injected, not scarred, not perforated, not erythematous, not retracted and not bulging. Tympanic membrane mobility is normal. No middle ear effusion. No hemotympanum. No decreased hearing is noted.   Left Ear: External ear normal. No lacerations. No drainage, swelling or tenderness. No foreign bodies. No mastoid tenderness. Tympanic membrane is not injected, not scarred, not perforated, not erythematous, not retracted and not bulging. Tympanic membrane mobility is normal.  No middle ear effusion. No hemotympanum. No decreased hearing is noted.   Nose: Nose normal.   Mouth/Throat: Oropharynx is clear and moist.   Eyes: EOM and lids are normal. Pupils are equal, round, and reactive to light. Right eye exhibits no chemosis, no discharge, no exudate and no hordeolum. No foreign body present in the right eye. Left eye exhibits no chemosis, no discharge, no exudate and no hordeolum. No foreign body present in the left eye. Right conjunctiva is not injected. Right conjunctiva has no hemorrhage. Left conjunctiva is not injected. Left conjunctiva has no hemorrhage. No scleral icterus. Right eye exhibits normal extraocular motion and no nystagmus. Left eye exhibits normal extraocular motion and no nystagmus. Right pupil is round and reactive. Left pupil is round and reactive. Pupils are equal.   Neck: Normal range of motion, full passive range of motion without pain and phonation normal. Neck supple. Normal carotid pulses, no hepatojugular reflux and no JVD present. No  tracheal tenderness, no spinous process tenderness and no muscular tenderness present. Carotid bruit is not present. No rigidity. No tracheal deviation, no edema, no erythema and normal range of motion present. No thyroid mass and no thyromegaly present.   Cardiovascular: Normal rate, regular rhythm, normal heart sounds and intact distal pulses.   No extrasystoles are present. PMI is not displaced.  Exam reveals no gallop, no friction rub and no decreased pulses.    No murmur heard.  Pulses:       Carotid pulses are 2+ on the right side, and 2+ on the left side.       Radial pulses are 2+ on the right side, and 2+ on the left side.        Dorsalis pedis pulses are 2+ on the left side.        Posterior tibial pulses are 2+ on the left side.   Pulmonary/Chest: Effort normal and breath sounds normal. No accessory muscle usage or stridor. No apnea, no tachypnea and no bradypnea. He is not intubated. No respiratory distress. He has no decreased breath sounds. He has no wheezes. He has no rhonchi. He has no rales. He exhibits no tenderness.   Abdominal: Soft. Bowel sounds are normal. He exhibits no shifting dullness, no distension, no pulsatile liver, no fluid wave, no abdominal bruit, no ascites, no pulsatile midline mass and no mass. There is no hepatosplenomegaly, splenomegaly or hepatomegaly. There is no tenderness. There is no rigidity, no rebound, no guarding, no CVA tenderness and no tenderness at McBurney's point. No hernia. Hernia confirmed negative in the ventral area.   Musculoskeletal: Normal range of motion. He exhibits deformity. He exhibits no edema or tenderness.        Left foot: There is normal range of motion and no deformity.   Below the knee amputation right lower leg.   Feet:   Right Foot: amputated  Left Foot:   Protective Sensation: 6 sites tested. 2 sites sensed.   Skin Integrity: Negative for ulcer, blister, skin breakdown (erythema and abrasion at the medial ankle), erythema, warmth, callus or  dry skin.   Lymphadenopathy:        Head (right side): No submental, no submandibular, no tonsillar, no preauricular, no posterior auricular and no occipital adenopathy present.        Head (left side): No submental, no submandibular, no tonsillar, no preauricular, no posterior auricular and no occipital adenopathy present.     He has no cervical adenopathy.        Right cervical: No superficial cervical, no deep cervical and no posterior cervical adenopathy present.       Left cervical: No superficial cervical, no deep cervical and no posterior cervical adenopathy present.   Neurological: He is alert and oriented to person, place, and time. He has normal reflexes. He displays no atrophy and no tremor. No cranial nerve deficit or sensory deficit. He exhibits normal muscle tone. He displays no seizure activity. Coordination and gait normal.   Reflex Scores:       Bicep reflexes are 2+ on the right side and 2+ on the left side.       Brachioradialis reflexes are 2+ on the right side and 2+ on the left side.       Patellar reflexes are 2+ on the right side and 2+ on the left side.  Skin: Skin is warm and dry. No rash noted. No erythema. No pallor.   Psychiatric: He has a normal mood and affect. His mood appears not anxious. His affect is not angry, not blunt, not labile and not inappropriate. His speech is not rapid and/or pressured, not delayed, not tangential and not slurred. He is not agitated, not aggressive, not hyperactive, not slowed, not withdrawn, not actively hallucinating and not combative. Thought content is not paranoid and not delusional. Cognition and memory are not impaired. He does not express impulsivity or inappropriate judgment. He does not exhibit a depressed mood. He expresses no homicidal and no suicidal ideation. He expresses no suicidal plans and no homicidal plans. He is communicative. He exhibits normal recent memory and normal remote memory. He is attentive.     Assessment:     1. Type 2  diabetes mellitus with hyperglycemia, with long-term current use of insulin       Plan:   Lavelle Ladd is seen today for   1. Type 2 diabetes mellitus with hyperglycemia, with long-term current use of insulin      We have discussed the etiology and treatment options associated with the diagnosis as well as alternatives. He has elected the following treatments.     Type 2 diabetes mellitus with hyperglycemia, with long-term current use of insulin  -     POCT glucose  -     Glucose, fasting; Future; Expected date: 3/14/17  -     C-peptide; Future; Expected date: 3/14/17  -     Glutamic acid decarboxylase; Future; Expected date: 3/14/17  -     Hemoglobin A1c; Future; Expected date: 3/14/17  -     insulin regular (NOVOLIN R) 100 unit/mL Inj injection; Inject 6 units with breakfast and 8 units with dinner, subcutaneously 30 min before meal.  Dispense: 10 mL; Refill: 11  -     insulin NPH (NOVOLIN N) 100 unit/mL injection; Take 25 units subq with breakfast and 15 units at bedtime  Dispense: 1 vial; Refill: 11  -  Will order Tandem Insulin pump to assist with better glycemic control.      1.) Patient was instructed to monitor blood glucose twice daily, fasting, and 2 hour post meal; if on Multiple Daily Injections (MDI) he will need to have pre-meal blood glucose as well. Reminded to bring BG meter or record to each visit for review.  2.) Reviewed pathophysiology of type 2 diabetes, complications related to the disease, importance of annual dilated eye exam and self daily foot examination.  3.) Continue medications as prescribed MDI with [facility] My[facility] or Phone review in 1 week with BG records for adjustment of medication.  4.) Reviewed carb counting, portion control, importance of spacing meals throughout the day to prevent post prandial elevations. Recommended low saturated fat, low sodium diet to aid in control of hypertension and cholesterol.  5.) Discussed activity, benefits, methods, and precautions.  Recommended patient start/continue some form of exercise and increase as tolerated to 60 minutes per day to facilitate weight loss and aid in control of BGs. Also reminded patient of WHO recommendation of 10,000 steps daily as a goal.   6.) A1C, TSH, Lipid Panel, CMP with eGFR and Micro/Creatinine per ADA protocol.  7.) Return to clinic in 3 months for follow up. Advised patient to call clinic with any questions or concerns.    A total of 45 minutes was spent in face to face time, of which 50 % was spent in counseling patient on disease process, complications, treatment, and side effects of medications.    The patient was explained the above plan and given opportunity to ask questions.  He understands, chooses and consents to this plan and accepts all the risks, which include but are not limited to the risks mentioned above.   He understands the alternative of having no testing, interventions or treatments at this time. He left content and without further questions.

## 2017-03-16 ENCOUNTER — OFFICE VISIT (OUTPATIENT)
Dept: CARDIOLOGY | Facility: CLINIC | Age: 62
End: 2017-03-16
Payer: MEDICARE

## 2017-03-16 VITALS
BODY MASS INDEX: 28.66 KG/M2 | WEIGHT: 200.19 LBS | HEIGHT: 70 IN | DIASTOLIC BLOOD PRESSURE: 82 MMHG | HEART RATE: 64 BPM | SYSTOLIC BLOOD PRESSURE: 138 MMHG

## 2017-03-16 DIAGNOSIS — E78.2 MIXED HYPERLIPIDEMIA: ICD-10-CM

## 2017-03-16 DIAGNOSIS — I10 ESSENTIAL HYPERTENSION: ICD-10-CM

## 2017-03-16 DIAGNOSIS — Z79.4 TYPE 2 DIABETES MELLITUS WITH HYPERGLYCEMIA, WITH LONG-TERM CURRENT USE OF INSULIN: ICD-10-CM

## 2017-03-16 DIAGNOSIS — T86.11 KIDNEY TRANSPLANT REJECTION: ICD-10-CM

## 2017-03-16 DIAGNOSIS — E11.65 TYPE 2 DIABETES MELLITUS WITH HYPERGLYCEMIA, WITH LONG-TERM CURRENT USE OF INSULIN: ICD-10-CM

## 2017-03-16 DIAGNOSIS — I25.10 CORONARY ARTERY DISEASE INVOLVING NATIVE CORONARY ARTERY OF NATIVE HEART WITHOUT ANGINA PECTORIS: Primary | ICD-10-CM

## 2017-03-16 PROCEDURE — 99213 OFFICE O/P EST LOW 20 MIN: CPT | Mod: PBBFAC,PO | Performed by: INTERNAL MEDICINE

## 2017-03-16 PROCEDURE — 99999 PR PBB SHADOW E&M-EST. PATIENT-LVL III: CPT | Mod: PBBFAC,,, | Performed by: INTERNAL MEDICINE

## 2017-03-16 PROCEDURE — 99214 OFFICE O/P EST MOD 30 MIN: CPT | Mod: S$PBB,,, | Performed by: INTERNAL MEDICINE

## 2017-03-16 NOTE — MR AVS SNAPSHOT
[facility] Card[facility]  [address] [place] [address]  Phone: [phone]  Fax: [phone]                  Lavelle Ladd   3/16/2017 9:40 AM   Office Visit    Description:  Male : 1955   Provider:  Santosh Ivory MD   Department:  [facility] - Cardiology           Reason for Visit     Coronary Artery Disease           Diagnoses this Visit        Comments    Coronary artery disease involving native coronary artery of native heart without angina pectoris    -  Primary     Essential hypertension         Kidney transplant rejection         Type 2 diabetes mellitus with hyperglycemia, with long-term current use of insulin         Mixed hyperlipidemia                To Do List           Future Appointments        Provider Department Dept Phone    3/20/2017 9:20 AM LABORATORY, [facility] [phone]    3/24/2017 9:45 AM STEPHANIE Ruiz MD [facility] Ophthalmology [phone]    4/3/2017 9:50 AM LABORATORY, [facility] [phone]    2017 8:35 AM LABORATORY, [facility] [phone]    2017 9:30 AM Dana Morillo RD, CDE [facility] Diabetes Management [phone]      Goals (5 Years of Data)     None      Follow-Up and Disposition     Return in about 6 months (around 2017).      [facility]     [facility] Nurse Care Line -  Assistance  Registered nurses in the [facility] provide clinical advisement, health education, appointment booking, and other advisory services.  Call for this free service at [phone].             Medications           Message regarding Medications     Verify the changes and/or additions to your medication regime listed below are the same as discussed with your clinician today.  If any of these changes or additions are incorrect, please notify your healthcare provider.        STOP taking these medications     atorvastatin (LIPITOR) 20 MG tablet Take 1 tablet  "(20 mg total) by mouth once daily. While on Harvoni.           Verify that the below list of medications is an accurate representation of the medications you are currently taking.  If none reported, the list may be blank. If incorrect, please contact your healthcare provider. Carry this list with you in case of emergency.           Current Medications     albuterol-ipratropium 2.5mg-0.5mg/3mL (DUO-NEB) 0.5 mg-3 mg(2.5 mg base)/3 mL nebulizer solution Take 3 mLs by nebulization every 6 (six) hours.    aspirin (ECOTRIN) 81 MG EC tablet Take 1 tablet (81 mg total) by mouth once daily.    atovaquone (MEPRON) 750 mg/5 mL Susp Take 10 mLs (1,500 mg total) by mouth once daily.    BD INSULIN PEN NEEDLE UF SHORT 31 gauge x 5/16" Ndle     blood sugar diagnostic Strp 1 each by Misc.(Non-Drug; Combo Route) route 3 (three) times daily.    blood sugar diagnostic Strp 1 each by Misc.(Non-Drug; Combo Route) route 4 (four) times daily.    blood-glucose meter kit Use as instructed    budesonide-formoterol 160-4.5 mcg (SYMBICORT) 160-4.5 mcg/actuation HFAA Inhale 2 puffs into the lungs every 12 (twelve) hours. Wash out mouth after using    ergocalciferol (ERGOCALCIFEROL) 50,000 unit Cap Take 1 capsule (50,000 Units total) by mouth every 7 days. Take on Mondays    ERTAPENEM SODIUM (ERTAPENEM, INVANZ, 1 G/100 ML NS, READY TO MIX,) Inject 100 mLs (1 g total) into the vein once daily.    famotidine (PEPCID) 20 MG tablet Take 1 tablet (20 mg total) by mouth every evening.    inhalation device (BREATHERITE VALVED MDI CHAMBER) Use as directed for inhalation.    insulin NPH (NOVOLIN N) 100 unit/mL injection Take 25 units subq with breakfast and 15 units at bedtime    insulin regular (NOVOLIN R) 100 unit/mL Inj injection Inject 6 units with breakfast and 8 units with dinner, subcutaneously 30 min before meal.    insulin syringe,safetyneedle 1 mL 31 gauge x 5/16" Syrg 1 Syringe by Misc.(Non-Drug; Combo Route) route 4 (four) times daily with " "meals and nightly.    lancets Misc 1 each by Misc.(Non-Drug; Combo Route) route 3 (three) times daily.    magnesium oxide (MAG-OX) 400 mg tablet Take 1 tablet (400 mg total) by mouth 2 (two) times daily.    metoprolol tartrate (LOPRESSOR) 50 MG tablet Take 0.5 tablets (25 mg total) by mouth 2 (two) times daily.    multivitamin (THERAGRAN) tablet Take 1 tablet by mouth once daily.    mycophenolate (CELLCEPT) 250 mg Cap Take 2 capsules (500 mg total) by mouth 2 (two) times daily. Z94.0 Kidney Transplant 5/21/2016.    olanzapine (ZYPREXA) 5 MG tablet Take 1 tablet (5 mg total) by mouth every evening.    ondansetron (ZOFRAN-ODT) 4 MG TbDL Take 2 tablets (8 mg total) by mouth every 8 (eight) hours as needed (nausea).    oxycodone (ROXICODONE) 15 MG Tab Take 1 tablet (15 mg total) by mouth every 6 (six) hours as needed for Pain.    pen needle, diabetic (EASY COMFORT PEN NEEDLES) 32 gauge x 5/32" Ndle Inject 1 each into the skin 3 (three) times daily.    pen needle, diabetic 31 gauge x 1/4" Ndle 1 each by Misc.(Non-Drug; Combo Route) route 4 (four) times daily.    predniSONE (DELTASONE) 10 MG tablet 20 mg PO QD 12/3-1/2; 15 mg PO QD 1/3-2/2; 10 mg PO QD 2/3-3/2; 5 mg thereafter    sodium bicarbonate 650 MG tablet Take 4 tablets BID  four hours before or after Harvoni    tacrolimus (PROGRAF) 1 MG Cap Take 3mg in the AM and 2mg in the PM by mouth. Z94.0 Kidney Transplant    tramadol (ULTRAM-ER) 100 MG Tb24 Take 100 mg by mouth as needed.           Clinical Reference Information           Your Vitals Were     BP Pulse Height Weight BMI    138/82 (BP Location: Right arm) 64 5' 10" (1.778 m) 90.8 kg (200 lb 2.8 oz) 28.72 kg/m2      Blood Pressure          Most Recent Value    BP  138/82      Allergies as of 3/16/2017     Tylenol [Acetaminophen]      Immunizations Administered on Date of Encounter - 3/16/2017     None      Maintenance Dialysis History     Start End Type Comments Center    10/30/2013 5/21/2016 Hemo  " [facility]            Current Dialysis Center Information     No center information to display.            Transplant Information        Txp Date Organ Coordinator Care Team    5/21/2016 Kidney Rosita Newton, MAYKEL Referring Physician:  Avel Estrada MD   Current Nephrologist:  Avel Estrada MD   Surgeon:  Ronny Bergeron MD   Assisting Surgeon:  Terrell Navarrete MD   Resident:  Jose David Rowley MD         Language Assistance Services     ATTENTION: Language assistance services are available, free of charge. Please call [phone].      ATENCIÓN: Si habla español, tiene a hollis disposición servicios gratuitos de asistencia lingüística. Llame al [phone].     CHÚ Ý: N?u b?n nói Ti?ng Vi?t, có các d?ch v? h? tr? ngôn ng? mi?n phí dành cho b?n. G?i s? [phone].         [facility] complies with applicable Federal civil rights laws and does not discriminate on the basis of race, color, national origin, age, disability, or sex.

## 2017-03-16 NOTE — PROGRESS NOTES
Subjective:   Patient ID:  Lavelle Ladd is a 61 y.o. male who presents for follow up of Coronary Artery Disease      HPI Comments: 60 yo, male, came in for routine f/u.  PMH HTN, HLP, nonobstructive CAD, COPD, ESRD, s/p kidney transplant on 2016, DM x 8yrs, s/p right BKA after skin infection, s/p left arm fistula.  2015 LHC nonobstructive CAD, LAD 50% stenosis.    2016 2D echo normal LVEF, LVH, DD, biatrial enlargement.    Chronic stable CAMARA, on inhaler.  No chest pain, palpitation, dizziness,orthopnea, and syncope.   Occasional drinking. No smoking      Past Medical History:   Diagnosis Date    DINORAH (acute kidney injury) 2016    Arthritis     Chronic obstructive pulmonary disease 2016    Coronary artery disease involving native coronary artery of native heart without angina pectoris 2016     donor kidney transplant for DM 16     Induction with Thymo x3 and IV solumedrol to total 875mg  Kidney Biopsy  2016: 16 glomeruli, ACR type 1 AVR type 2, significant microcirculatory changes, c4d negative, No DSA, 5 to10% fibrosis. Treated with thymo x8 2016- no rejection      Diastolic heart failure     DM2 (diabetes mellitus, type 2)     Encounter for blood transfusion     ESRD on RRT since 10/2013 10/29/2013    Biopsy proven diabetic nephropathy and lymphoplasmacytic interstitial infiltrate not c/w with AIN (ddx sjogrens or assoc with tamm-horsefall protein extravasation)     GERD (gastroesophageal reflux disease)     Hep C w/o coma, chronic     Hyperlipidemia     Hypertension     Prophylactic immunotherapy     Proteinuria     Reactive airway disease     Renal hypertension     Type 2 diabetes mellitus with diabetic neuropathy, with long-term current use of insulin 2016    Type 2 diabetes mellitus with hyperglycemia, with long-term current use of insulin     Type 2 diabetes mellitus with retinopathy, with long-term current use of insulin 2016     Vitamin B12 deficiency        Past Surgical History:   Procedure Laterality Date    av bovine graft      Left UE    AV FISTULA PLACEMENT      left UE    CARDIAC CATHETERIZATION  02/2015    KIDNEY TRANSPLANT      LEG AMPUTATION THROUGH KNEE  2011    right LE, started as nail puncture leading to diabetic ulcer       Social History   Substance Use Topics    Smoking status: Former Smoker     Packs/day: 1.00     Years: 40.00     Quit date: 1/11/2013    Smokeless tobacco: Never Used    Alcohol use No       Family History   Problem Relation Age of Onset    Cancer Father     Diabetes Father     Heart failure Father     Stroke Father     Heart failure Mother     Kidney disease Neg Hx          Review of Systems   Constitution: Negative.   HENT: Negative.    Eyes: Negative.    Cardiovascular: Negative for chest pain.   Respiratory: Negative.    Endocrine: Negative.    Hematologic/Lymphatic: Negative.    Skin: Negative.    Musculoskeletal: Positive for back pain.   Gastrointestinal: Negative.    Genitourinary: Negative.    Neurological: Negative.    Psychiatric/Behavioral: Negative.    Allergic/Immunologic: Negative.        Objective:   Physical Exam   Constitutional: He is oriented to person, place, and time. He appears well-developed and well-nourished.   HENT:   Head: Normocephalic and atraumatic.   Eyes: Conjunctivae are normal. Pupils are equal, round, and reactive to light.   Neck: Normal range of motion. Neck supple.   Cardiovascular: Normal rate and regular rhythm.    Pulmonary/Chest: Effort normal and breath sounds normal.   Abdominal: Soft. Bowel sounds are normal.   Musculoskeletal: Normal range of motion.   S/p right BKA.   Neurological: He is alert and oriented to person, place, and time.   Skin: Skin is warm and dry.   Psychiatric: He has a normal mood and affect.       Lab Results   Component Value Date    CHOL 132 09/09/2016    CHOL 153 08/15/2016    CHOL 151 08/01/2016     Lab Results   Component  Value Date    HDL 48 09/09/2016    HDL 63 08/15/2016    HDL 51 08/01/2016     Lab Results   Component Value Date    LDLCALC 58.0 (L) 09/09/2016    LDLCALC 73.0 08/15/2016    LDLCALC 62.4 (L) 08/01/2016     Lab Results   Component Value Date    TRIG 130 09/09/2016    TRIG 85 08/15/2016    TRIG 188 (H) 08/01/2016     Lab Results   Component Value Date    CHOLHDL 36.4 09/09/2016    CHOLHDL 41.2 08/15/2016    CHOLHDL 33.8 08/01/2016       Chemistry        Component Value Date/Time     02/27/2017 0805    K 4.2 02/27/2017 0805     02/27/2017 0805    CO2 21 (L) 02/27/2017 0805    BUN 13 02/27/2017 0805    CREATININE 1.7 (H) 02/27/2017 0805     02/27/2017 0805        Component Value Date/Time    CALCIUM 9.4 02/27/2017 0805    ALKPHOS 106 02/13/2017 0705    AST 19 02/13/2017 0705    ALT 22 02/13/2017 0705    BILITOT 0.6 02/13/2017 0705          Lab Results   Component Value Date    TSH 1.417 08/01/2016     Lab Results   Component Value Date    INR 1.0 11/17/2016    INR 1.0 10/06/2016    INR 1.0 08/15/2016     Lab Results   Component Value Date    WBC 2.88 (L) 02/27/2017    HGB 13.5 (L) 02/27/2017    HCT 41.1 02/27/2017    MCV 98 02/27/2017     02/27/2017     BMP  Sodium   Date Value Ref Range Status   02/27/2017 140 136 - 145 mmol/L Final     Potassium   Date Value Ref Range Status   02/27/2017 4.2 3.5 - 5.1 mmol/L Final     Chloride   Date Value Ref Range Status   02/27/2017 106 95 - 110 mmol/L Final     CO2   Date Value Ref Range Status   02/27/2017 21 (L) 23 - 29 mmol/L Final     BUN, Bld   Date Value Ref Range Status   02/27/2017 13 8 - 23 mg/dL Final     Creatinine   Date Value Ref Range Status   02/27/2017 1.7 (H) 0.5 - 1.4 mg/dL Final     Calcium   Date Value Ref Range Status   02/27/2017 9.4 8.7 - 10.5 mg/dL Final     Anion Gap   Date Value Ref Range Status   02/27/2017 13 8 - 16 mmol/L Final     eGFR if    Date Value Ref Range Status   02/27/2017 48.5 (A) >60 mL/min/1.73 m^2  Final     eGFR if non    Date Value Ref Range Status   02/27/2017 42.0 (A) >60 mL/min/1.73 m^2 Final     Comment:     Calculation used to obtain the estimated glomerular filtration  rate (eGFR) is the CKD-EPI equation. Since race is unknown   in our information system, the eGFR values for   -American and Non--American patients are given   for each creatinine result.       CrCl cannot be calculated (Patient has no serum creatinine result on file.).     Assessment:      1. Coronary artery disease involving native coronary artery of native heart without angina pectoris    2. Essential hypertension    3. Kidney transplant rejection    4. Type 2 diabetes mellitus with hyperglycemia, with long-term current use of insulin    5. Mixed hyperlipidemia      BP and LDL wnl    Plan:   Continue ASA and metoprolol  DASH  Continue current meds.  Recommend heart-healthy diet, weight control and regular exercise.  John. Risk modification.   RTC in 6 months    I have reviewed all pertinent labs and cardiac studies. Plans and recommendations have been formulated under my direct supervision. All questions answered and patient voiced understanding. Patient to continue current medications.

## 2017-03-20 ENCOUNTER — LAB VISIT (OUTPATIENT)
Dept: LAB | Facility: HOSPITAL | Age: 62
End: 2017-03-20
Attending: INTERNAL MEDICINE
Payer: MEDICARE

## 2017-03-20 DIAGNOSIS — Z79.4 TYPE 2 DIABETES MELLITUS WITH HYPERGLYCEMIA, WITH LONG-TERM CURRENT USE OF INSULIN: ICD-10-CM

## 2017-03-20 DIAGNOSIS — E11.65 TYPE 2 DIABETES MELLITUS WITH HYPERGLYCEMIA, WITH LONG-TERM CURRENT USE OF INSULIN: ICD-10-CM

## 2017-03-20 DIAGNOSIS — Z94.0 KIDNEY REPLACED BY TRANSPLANT: ICD-10-CM

## 2017-03-20 LAB
ALBUMIN SERPL BCP-MCNC: 3.5 G/DL
ANION GAP SERPL CALC-SCNC: 12 MMOL/L
BASOPHILS # BLD AUTO: 0.01 K/UL
BASOPHILS NFR BLD: 0.2 %
BUN SERPL-MCNC: 27 MG/DL
CALCIUM SERPL-MCNC: 9.5 MG/DL
CHLORIDE SERPL-SCNC: 106 MMOL/L
CO2 SERPL-SCNC: 23 MMOL/L
CREAT SERPL-MCNC: 2 MG/DL
DIFFERENTIAL METHOD: ABNORMAL
EOSINOPHIL # BLD AUTO: 0 K/UL
EOSINOPHIL NFR BLD: 1 %
ERYTHROCYTE [DISTWIDTH] IN BLOOD BY AUTOMATED COUNT: 13.3 %
EST. GFR  (AFRICAN AMERICAN): 39.9 ML/MIN/1.73 M^2
EST. GFR  (NON AFRICAN AMERICAN): 34.5 ML/MIN/1.73 M^2
GLUCOSE SERPL-MCNC: 171 MG/DL
HCT VFR BLD AUTO: 42.9 %
HGB BLD-MCNC: 13.9 G/DL
LYMPHOCYTES # BLD AUTO: 1 K/UL
LYMPHOCYTES NFR BLD: 25.2 %
MAGNESIUM SERPL-MCNC: 1.6 MG/DL
MCH RBC QN AUTO: 31.7 PG
MCHC RBC AUTO-ENTMCNC: 32.4 %
MCV RBC AUTO: 98 FL
MONOCYTES # BLD AUTO: 0.5 K/UL
MONOCYTES NFR BLD: 10.9 %
NEUTROPHILS # BLD AUTO: 2.6 K/UL
NEUTROPHILS NFR BLD: 62 %
PHOSPHATE SERPL-MCNC: 2.7 MG/DL
PLATELET # BLD AUTO: 225 K/UL
PMV BLD AUTO: 11.2 FL
POTASSIUM SERPL-SCNC: 4.9 MMOL/L
RBC # BLD AUTO: 4.38 M/UL
SODIUM SERPL-SCNC: 141 MMOL/L
WBC # BLD AUTO: 4.13 K/UL

## 2017-03-20 PROCEDURE — 80197 ASSAY OF TACROLIMUS: CPT

## 2017-03-20 PROCEDURE — 85025 COMPLETE CBC W/AUTO DIFF WBC: CPT

## 2017-03-20 PROCEDURE — 36415 COLL VENOUS BLD VENIPUNCTURE: CPT | Mod: PO

## 2017-03-20 PROCEDURE — 84681 ASSAY OF C-PEPTIDE: CPT

## 2017-03-20 PROCEDURE — 83036 HEMOGLOBIN GLYCOSYLATED A1C: CPT

## 2017-03-20 PROCEDURE — 82947 ASSAY GLUCOSE BLOOD QUANT: CPT

## 2017-03-20 PROCEDURE — 83735 ASSAY OF MAGNESIUM: CPT

## 2017-03-20 PROCEDURE — 80069 RENAL FUNCTION PANEL: CPT

## 2017-03-21 ENCOUNTER — TELEPHONE (OUTPATIENT)
Dept: TRANSPLANT | Facility: CLINIC | Age: 62
End: 2017-03-21

## 2017-03-21 LAB
ESTIMATED AVG GLUCOSE: 212 MG/DL
HBA1C MFR BLD HPLC: 9 %
TACROLIMUS BLD-MCNC: 25.9 NG/ML

## 2017-03-21 NOTE — PROGRESS NOTES
Results reviewed, and action is needed: Please repeat tacrolimus (Prograf) within next week and ensure it is drawn 12 hour after last dose for accuracy.

## 2017-03-22 LAB — GAD65 AB SER-SCNC: 0 NMOL/L

## 2017-03-23 ENCOUNTER — TELEPHONE (OUTPATIENT)
Dept: PULMONOLOGY | Facility: CLINIC | Age: 62
End: 2017-03-23

## 2017-03-23 DIAGNOSIS — R06.02 SOB (SHORTNESS OF BREATH): Primary | ICD-10-CM

## 2017-03-23 LAB — C PEPTIDE SERPL-MCNC: 3.5 NG/ML

## 2017-03-24 ENCOUNTER — OFFICE VISIT (OUTPATIENT)
Dept: OPHTHALMOLOGY | Facility: CLINIC | Age: 62
End: 2017-03-24
Payer: MEDICARE

## 2017-03-24 DIAGNOSIS — Z79.4 TYPE 2 DIABETES MELLITUS WITH STABLE PROLIFERATIVE RETINOPATHY OF BOTH EYES, WITH LONG-TERM CURRENT USE OF INSULIN: Primary | ICD-10-CM

## 2017-03-24 DIAGNOSIS — E11.3553 TYPE 2 DIABETES MELLITUS WITH STABLE PROLIFERATIVE RETINOPATHY OF BOTH EYES, WITH LONG-TERM CURRENT USE OF INSULIN: Primary | ICD-10-CM

## 2017-03-24 PROCEDURE — 99999 PR PBB SHADOW E&M-EST. PATIENT-LVL II: CPT | Mod: PBBFAC,,, | Performed by: OPHTHALMOLOGY

## 2017-03-24 PROCEDURE — 92002 INTRM OPH EXAM NEW PATIENT: CPT | Mod: S$PBB,,, | Performed by: OPHTHALMOLOGY

## 2017-03-24 RX ORDER — OXYCODONE HYDROCHLORIDE 10 MG/1
TABLET ORAL
COMMUNITY
Start: 2017-03-15 | End: 2017-10-16

## 2017-03-24 RX ORDER — PEN NEEDLE, DIABETIC 29 G X1/2"
NEEDLE, DISPOSABLE MISCELLANEOUS
COMMUNITY
Start: 2017-03-15 | End: 2018-06-11 | Stop reason: SDUPTHER

## 2017-03-24 NOTE — MR AVS SNAPSHOT
[facility]  [address] [place] [address]  Phone: [phone]  Fax: [phone]                  Lavelle Ladd   3/24/2017 9:45 AM   Office Visit    Description:  Male : 1955   Provider:  STEPHANIE Ruiz MD   Department:  [facility] - Op[facility]           Reason for Visit     BDR           Diagnoses this Visit        Comments    Type 2 diabetes mellitus with stable proliferative retinopathy of both eyes, with long-term current use of insulin    -  Primary            To Do List           Future Appointments        Provider Department Dept Phone    3/24/2017 10:45 AM LABORATORY, [facility] [phone]    3/30/2017 10:15 AM [facility] XR2 [facility] [phone]    3/30/2017 10:40 AM PULMONARY LAB, [facility] Pul[facility] Function [facility] [phone]    3/30/2017 11:00 AM PULMONARY LAB, [facility]- Pulmonary Function S[facility] [phone]    3/30/2017 11:20 AM Elizabeth Lejeune, NP [facility] [phone]      Goals (5 Years of Data)     None      Follow-Up and Disposition     Return in about 1 year (around 3/24/2018).      [facility]     [facility] -  Assistance  Registered nurses in the [facility] provide clinical advisement, health education, appointment booking, and other advisory services.  Call for this free service at [phone].             Medications           Message regarding Medications     Verify the changes and/or additions to your medication regime listed below are the same as discussed with your clinician today.  If any of these changes or additions are incorrect, please notify your healthcare provider.             Verify that the below list of medications is an accurate representation of the medications you are currently taking.  If none reported, the list may be blank. If incorrect, please contact your healthcare provider. Carry this list with you in case of emergency.  "          Current Medications     albuterol-ipratropium 2.5mg-0.5mg/3mL (DUO-NEB) 0.5 mg-3 mg(2.5 mg base)/3 mL nebulizer solution Take 3 mLs by nebulization every 6 (six) hours.    aspirin (ECOTRIN) 81 MG EC tablet Take 1 tablet (81 mg total) by mouth once daily.    atovaquone (MEPRON) 750 mg/5 mL Susp Take 10 mLs (1,500 mg total) by mouth once daily.    BD INSULIN PEN NEEDLE UF SHORT 31 gauge x 5/16" Ndle     BD INSULIN SYRINGE ULTRA-FINE 1 mL 31 gauge x 5/16 Syrg     blood sugar diagnostic Strp 1 each by Misc.(Non-Drug; Combo Route) route 3 (three) times daily.    blood sugar diagnostic Strp 1 each by Misc.(Non-Drug; Combo Route) route 4 (four) times daily.    blood-glucose meter kit Use as instructed    budesonide-formoterol 160-4.5 mcg (SYMBICORT) 160-4.5 mcg/actuation HFAA Inhale 2 puffs into the lungs every 12 (twelve) hours. Wash out mouth after using    ergocalciferol (ERGOCALCIFEROL) 50,000 unit Cap Take 1 capsule (50,000 Units total) by mouth every 7 days. Take on Mondays    ERTAPENEM SODIUM (ERTAPENEM, INVANZ, 1 G/100 ML NS, READY TO MIX,) Inject 100 mLs (1 g total) into the vein once daily.    famotidine (PEPCID) 20 MG tablet Take 1 tablet (20 mg total) by mouth every evening.    inhalation device (BREATHERITE VALVED MDI CHAMBER) Use as directed for inhalation.    insulin NPH (NOVOLIN N) 100 unit/mL injection Take 25 units subq with breakfast and 15 units at bedtime    insulin regular (NOVOLIN R) 100 unit/mL Inj injection Inject 6 units with breakfast and 8 units with dinner, subcutaneously 30 min before meal.    insulin syringe,safetyneedle 1 mL 31 gauge x 5/16" Syrg 1 Syringe by Misc.(Non-Drug; Combo Route) route 4 (four) times daily with meals and nightly.    lancets Misc 1 each by Misc.(Non-Drug; Combo Route) route 3 (three) times daily.    magnesium oxide (MAG-OX) 400 mg tablet Take 1 tablet (400 mg total) by mouth 2 (two) times daily.    metoprolol tartrate (LOPRESSOR) 50 MG tablet Take 0.5 " "tablets (25 mg total) by mouth 2 (two) times daily.    multivitamin (THERAGRAN) tablet Take 1 tablet by mouth once daily.    mycophenolate (CELLCEPT) 250 mg Cap Take 2 capsules (500 mg total) by mouth 2 (two) times daily. Z94.0 Kidney Transplant 5/21/2016.    olanzapine (ZYPREXA) 5 MG tablet Take 1 tablet (5 mg total) by mouth every evening.    ondansetron (ZOFRAN-ODT) 4 MG TbDL Take 2 tablets (8 mg total) by mouth every 8 (eight) hours as needed (nausea).    oxycodone (ROXICODONE) 10 mg Tab immediate release tablet     oxycodone (ROXICODONE) 15 MG Tab Take 1 tablet (15 mg total) by mouth every 6 (six) hours as needed for Pain.    pen needle, diabetic (EASY COMFORT PEN NEEDLES) 32 gauge x 5/32" Ndle Inject 1 each into the skin 3 (three) times daily.    pen needle, diabetic 31 gauge x 1/4" Ndle 1 each by Misc.(Non-Drug; Combo Route) route 4 (four) times daily.    predniSONE (DELTASONE) 10 MG tablet 20 mg PO QD 12/3-1/2; 15 mg PO QD 1/3-2/2; 10 mg PO QD 2/3-3/2; 5 mg thereafter    sodium bicarbonate 650 MG tablet Take 4 tablets BID  four hours before or after Harvoni    tacrolimus (PROGRAF) 1 MG Cap Take 3mg in the AM and 2mg in the PM by mouth. Z94.0 Kidney Transplant    tramadol (ULTRAM-ER) 100 MG Tb24 Take 100 mg by mouth as needed.           Clinical Reference Information           Allergies as of 3/24/2017     Tylenol [Acetaminophen]      Immunizations Administered on Date of Encounter - 3/24/2017     None      Maintenance Dialysis History     Start End Type Comments Center    10/30/2013 5/21/2016 Hemo  [facility]            Current Dialysis Center Information     No center information to display.            Transplant Information        Txp Date Organ Coordinator Care Team    5/21/2016 Kidney Rosita Newton RN Referring Physician:  Avel Estrada MD   Current Nephrologist:  Avel Estrada MD   Surgeon:  Ronny Bergeron MD   Assisting Surgeon:  Terrell Navarrete MD   Resident:  Jose David" NOA Rowley MD         Language Assistance Services     ATTENTION: Language assistance services are available, free of charge. Please call [phone].      ATENCIÓN: Si habla artemio, tiene a hollis disposición servicios gratuitos de asistencia lingüística. Llame al [phone].     CHÚ Ý: N?u b?n nói Ti?ng Vi?t, có các d?ch v? h? tr? ngôn ng? mi?n phí dành cho b?n. G?i s? [phone].         [facility] complies with applicable Federal civil rights laws and does not discriminate on the basis of race, color, national origin, age, disability, or sex.

## 2017-03-24 NOTE — PROGRESS NOTES
===============================  Lavelle Ladd,   61 y.o. male   Last visit [facility]: :Visit date not found   Last visit eye dept. Visit date not found  [facility]:  Corrected distance visual acuity was 20/25 in the right eye and 20/30 in the left eye.  Tonometry     Tonometry (Applanation, 9:12 AM)      Right Left   Pressure 14 14                  Not recorded         Not recorded        Chief Complaint   Patient presents with    BDR     BDR EVAL PER DR. KIRK        HPI     BDR    Additional comments: BDR EVAL PER DR. KIRK           Comments   Diabetic x 2010  Saw Dr. Ramos in the past for BDR  Had Retina Laser and Injections   Last saw him 1.5 years ago     May 21, 2016 Kidney transplant     Referred by Dana Zacarias       Last edited by Jessica Giordano on 3/24/2017  9:04 AM. (History)      Read Studies:   Vitals    ________________  3/24/2017  1. Type 2 diabetes mellitus with stable proliferative retinopathy of both eyes, with long-term current use of insulin Dm x  8 years  sp prev focal Post limited PRP No active NV, no PRH, No VH Few ma's Stable Post PDR  rtc with me in 1 year       .  Dm x  8 years   sp prev focal  Post limited PRP  No active NV, no PRH, No VH  Few ma's  Stable Post PDR  rtc with me in 1 year       ===========================

## 2017-03-30 ENCOUNTER — PROCEDURE VISIT (OUTPATIENT)
Dept: PULMONOLOGY | Facility: CLINIC | Age: 62
End: 2017-03-30
Payer: MEDICARE

## 2017-03-30 ENCOUNTER — HOSPITAL ENCOUNTER (OUTPATIENT)
Dept: RADIOLOGY | Facility: HOSPITAL | Age: 62
Discharge: HOME | End: 2017-03-30
Attending: NURSE PRACTITIONER
Payer: MEDICARE

## 2017-03-30 ENCOUNTER — OFFICE VISIT (OUTPATIENT)
Dept: PULMONOLOGY | Facility: CLINIC | Age: 62
End: 2017-03-30
Payer: MEDICARE

## 2017-03-30 VITALS
OXYGEN SATURATION: 99 % | BODY MASS INDEX: 29.92 KG/M2 | HEIGHT: 70 IN | WEIGHT: 209 LBS | SYSTOLIC BLOOD PRESSURE: 114 MMHG | DIASTOLIC BLOOD PRESSURE: 74 MMHG | HEART RATE: 67 BPM | RESPIRATION RATE: 24 BRPM

## 2017-03-30 DIAGNOSIS — R06.02 SOB (SHORTNESS OF BREATH): ICD-10-CM

## 2017-03-30 DIAGNOSIS — J44.9 CHRONIC OBSTRUCTIVE PULMONARY DISEASE: ICD-10-CM

## 2017-03-30 DIAGNOSIS — J44.9 CHRONIC OBSTRUCTIVE PULMONARY DISEASE, UNSPECIFIED COPD TYPE: Primary | ICD-10-CM

## 2017-03-30 DIAGNOSIS — R93.89 ABNORMAL CT OF THE CHEST: ICD-10-CM

## 2017-03-30 LAB
PRE FEF 25 75: 1.81 L/S (ref 2.03–3.63)
PRE FET 100: 15.77 S
PRE FEV1 FVC: 69 %
PRE FEV1: 2.99 L (ref 3.12–3.91)
PRE FVC: 4.31 L (ref 4.22–5.15)
PRE PEF: 6.28 L/S (ref 7.85–10.17)
PREDICTED FEV1 FVC: 75.05 % (ref 70.21–79.89)
PREDICTED FEV1: 3.52 L (ref 3.12–3.91)
PREDICTED FVC: 4.69 L (ref 4.22–5.15)
PROVOCATION PROTOCOL: ABNORMAL

## 2017-03-30 PROCEDURE — 71020 XR CHEST PA AND LATERAL: CPT | Mod: 26,,, | Performed by: RADIOLOGY

## 2017-03-30 PROCEDURE — 99215 OFFICE O/P EST HI 40 MIN: CPT | Mod: PBBFAC,PO | Performed by: NURSE PRACTITIONER

## 2017-03-30 PROCEDURE — 36600 WITHDRAWAL OF ARTERIAL BLOOD: CPT | Mod: S$PBB,,, | Performed by: INTERNAL MEDICINE

## 2017-03-30 PROCEDURE — 99999 PR PBB SHADOW E&M-EST. PATIENT-LVL V: CPT | Mod: PBBFAC,,, | Performed by: NURSE PRACTITIONER

## 2017-03-30 PROCEDURE — 94010 BREATHING CAPACITY TEST: CPT | Mod: 26,S$PBB,, | Performed by: INTERNAL MEDICINE

## 2017-03-30 PROCEDURE — 99214 OFFICE O/P EST MOD 30 MIN: CPT | Mod: 25,S$PBB,, | Performed by: NURSE PRACTITIONER

## 2017-03-30 RX ORDER — ALBUTEROL SULFATE 90 UG/1
2 AEROSOL, METERED RESPIRATORY (INHALATION) EVERY 4 HOURS PRN
Qty: 18 G | Refills: 3 | Status: SHIPPED | OUTPATIENT
Start: 2017-03-30 | End: 2017-09-06 | Stop reason: SDUPTHER

## 2017-03-30 RX ORDER — METHYLPREDNISOLONE 4 MG/1
TABLET ORAL
Qty: 1 PACKAGE | Refills: 0 | Status: ON HOLD | OUTPATIENT
Start: 2017-03-30 | End: 2017-04-08 | Stop reason: HOSPADM

## 2017-03-30 NOTE — PROGRESS NOTES
"Subjective:      Patient ID: Lavelle Ladd is a 61 y.o. male.    Chief Complaint: COPD    HPI Comments: Patient presents to the office today for evaluation to have a dental procedure dental procedure is anticipated to be with local anesthetic only.  Patient has COPD, shortness of breath, history of Klebsiella pneumonia 2016.  Patient states over the last few days he has had a wet type cough which is unproductive.  No fever.  Issue may be lying down in the dental chair at this time.  Possible postnasal drip.  He has Symbicort but only takes as needed.  Neb treatments as needed.    COPD   Associated symptoms include coughing.       /74  Pulse 67  Resp (!) 24  Ht 5' 10" (1.778 m)  Wt 94.8 kg (209 lb)  SpO2 99%  BMI 29.99 kg/m2  Body mass index is 29.99 kg/(m^2).    Review of Systems   Constitutional: Negative.    HENT: Negative.    Respiratory: Positive for cough.    Cardiovascular: Negative.    Musculoskeletal: Negative.    Gastrointestinal: Negative.    Neurological: Negative.      Objective:      Physical Exam   Constitutional: He is oriented to person, place, and time. He appears well-developed and well-nourished.   Neck: Normal range of motion. Neck supple.   Cardiovascular: Normal rate and regular rhythm.    Pulmonary/Chest: Effort normal and breath sounds normal.   Abdominal: Soft. He exhibits no mass.   Musculoskeletal:   R AKA   Neurological: He is alert and oriented to person, place, and time.   Skin: Skin is warm and dry.   Psychiatric: He has a normal mood and affect.     Personal Diagnostic Review  Spirometry with an FEV1 of 85% of predicted.    Results for orders placed during the hospital encounter of 03/30/17   X-Ray Chest PA And Lateral    Narrative Comparison: 3/1/2017    Technique: PA and lateral views of the chest was obtained    Findings: The cardiac and mediastinal silhouettes are within normal limits.   Minimal scarring versus subsegmental atelectasis near the left costophrenic " "angle is unchanged.  No parenchymal consolidations or pleural effusions demonstrated. Multilevel degenerative changes noted throughout the visualized spine.    Impression No acute findings              Electronically signed by: SHIRA PATEL MD  Date:     03/30/17  Time:    07:54          Assessment:       1. Chronic obstructive pulmonary disease, unspecified COPD type    2. SOB (shortness of breath)    3. Abnormal CT of the chest- transient pulmonar opacities        Outpatient Encounter Prescriptions as of 3/30/2017   Medication Sig Dispense Refill    albuterol-ipratropium 2.5mg-0.5mg/3mL (DUO-NEB) 0.5 mg-3 mg(2.5 mg base)/3 mL nebulizer solution Take 3 mLs by nebulization every 6 (six) hours. 120 vial 12    aspirin (ECOTRIN) 81 MG EC tablet Take 1 tablet (81 mg total) by mouth once daily. 30 tablet 0    atovaquone (MEPRON) 750 mg/5 mL Susp Take 10 mLs (1,500 mg total) by mouth once daily. 300 mL 7    BD INSULIN PEN NEEDLE UF SHORT 31 gauge x 5/16" Ndle       BD INSULIN SYRINGE ULTRA-FINE 1 mL 31 gauge x 5/16 Syrg       blood sugar diagnostic Strp 1 each by Misc.(Non-Drug; Combo Route) route 3 (three) times daily. 100 each 11    blood-glucose meter kit Use as instructed 1 each 0    budesonide-formoterol 160-4.5 mcg (SYMBICORT) 160-4.5 mcg/actuation HFAA Inhale 2 puffs into the lungs every 12 (twelve) hours. Wash out mouth after using 1 Inhaler 12    ergocalciferol (ERGOCALCIFEROL) 50,000 unit Cap Take 1 capsule (50,000 Units total) by mouth every 7 days. Take on Mondays 4 capsule 11    ERTAPENEM SODIUM (ERTAPENEM, INVANZ, 1 G/100 ML NS, READY TO MIX,) Inject 100 mLs (1 g total) into the vein once daily.      famotidine (PEPCID) 20 MG tablet Take 1 tablet (20 mg total) by mouth every evening. 30 tablet 11    inhalation device (BREATHERITE VALVED MDI CHAMBER) Use as directed for inhalation. 1 Device prn    insulin NPH (NOVOLIN N) 100 unit/mL injection Take 25 units subq with breakfast and 15 units at " "bedtime 1 vial 11    insulin regular (NOVOLIN R) 100 unit/mL Inj injection Inject 6 units with breakfast and 8 units with dinner, subcutaneously 30 min before meal. 10 mL 11    insulin syringe,safetyneedle 1 mL 31 gauge x 5/16" Syrg 1 Syringe by Misc.(Non-Drug; Combo Route) route 4 (four) times daily with meals and nightly. 120 Syringe 11    lancets Misc 1 each by Misc.(Non-Drug; Combo Route) route 3 (three) times daily. 100 each 11    magnesium oxide (MAG-OX) 400 mg tablet Take 1 tablet (400 mg total) by mouth 2 (two) times daily. (Patient taking differently: Take 400 mg by mouth once daily. ) 60 tablet 11    metoprolol tartrate (LOPRESSOR) 50 MG tablet Take 0.5 tablets (25 mg total) by mouth 2 (two) times daily. 30 tablet 11    multivitamin (THERAGRAN) tablet Take 1 tablet by mouth once daily. 30 tablet 11    mycophenolate (CELLCEPT) 250 mg Cap Take 2 capsules (500 mg total) by mouth 2 (two) times daily. Z94.0 Kidney Transplant 5/21/2016. 120 capsule 11    olanzapine (ZYPREXA) 5 MG tablet Take 1 tablet (5 mg total) by mouth every evening. (Patient taking differently: Take 5 mg by mouth once daily. ) 30 tablet 11    ondansetron (ZOFRAN-ODT) 4 MG TbDL Take 2 tablets (8 mg total) by mouth every 8 (eight) hours as needed (nausea). 12 tablet 0    oxycodone (ROXICODONE) 10 mg Tab immediate release tablet       oxycodone (ROXICODONE) 15 MG Tab Take 1 tablet (15 mg total) by mouth every 6 (six) hours as needed for Pain. 20 tablet 0    pen needle, diabetic (EASY COMFORT PEN NEEDLES) 32 gauge x 5/32" Ndle Inject 1 each into the skin 3 (three) times daily. 100 each 11    pen needle, diabetic 31 gauge x 1/4" Ndle 1 each by Misc.(Non-Drug; Combo Route) route 4 (four) times daily. 100 each 0    sodium bicarbonate 650 MG tablet Take 4 tablets BID  four hours before or after Harvoni 240 tablet 11    tacrolimus (PROGRAF) 1 MG Cap Take 3mg in the AM and 2mg in the PM by mouth. Z94.0 Kidney Transplant 150 " capsule 11    tramadol (ULTRAM-ER) 100 MG Tb24 Take 100 mg by mouth as needed.      [DISCONTINUED] blood sugar diagnostic Strp 1 each by Misc.(Non-Drug; Combo Route) route 4 (four) times daily. 100 each 5    [DISCONTINUED] predniSONE (DELTASONE) 10 MG tablet 20 mg PO QD 12/3-1/2; 15 mg PO QD 1/3-2/2; 10 mg PO QD 2/3-3/2; 5 mg thereafter 100 tablet 1    albuterol (VENTOLIN HFA) 90 mcg/actuation inhaler Inhale 2 puffs into the lungs every 4 (four) hours as needed for Wheezing. 18 g 3    methylPREDNISolone (MEDROL DOSEPACK) 4 mg tablet use as directed 1 Package 0     No facility-administered encounter medications on file as of 3/30/2017.      No orders of the defined types were placed in this encounter.    Plan:      No contraindication for local anesthesia for dental procedure.  At this time patient has cough which increases when lying back.  Patient will tolerate procedure once cough resolves.   Medrol dose pack and sliding  scale insulin if needed.   Takes Symbicort 2 puffs every 12 hours.    Prescription of Ventolin sent to pharmacy.

## 2017-04-03 ENCOUNTER — EPISODE CHANGES (OUTPATIENT)
Dept: HEPATOLOGY | Facility: CLINIC | Age: 62
End: 2017-04-03

## 2017-04-03 ENCOUNTER — LAB VISIT (OUTPATIENT)
Dept: LAB | Facility: HOSPITAL | Age: 62
End: 2017-04-03
Attending: INTERNAL MEDICINE
Payer: MEDICARE

## 2017-04-03 DIAGNOSIS — B18.2 CHRONIC HEPATITIS C WITHOUT HEPATIC COMA: ICD-10-CM

## 2017-04-03 LAB
ALBUMIN SERPL BCP-MCNC: 3.3 G/DL
ALP SERPL-CCNC: 96 U/L
ALT SERPL W/O P-5'-P-CCNC: 14 U/L
ANION GAP SERPL CALC-SCNC: 10 MMOL/L
AST SERPL-CCNC: 16 U/L
BILIRUB SERPL-MCNC: 0.3 MG/DL
BUN SERPL-MCNC: 40 MG/DL
CALCIUM SERPL-MCNC: 9.5 MG/DL
CHLORIDE SERPL-SCNC: 105 MMOL/L
CO2 SERPL-SCNC: 23 MMOL/L
CREAT SERPL-MCNC: 1.8 MG/DL
EST. GFR  (AFRICAN AMERICAN): 45.3 ML/MIN/1.73 M^2
EST. GFR  (NON AFRICAN AMERICAN): 39.2 ML/MIN/1.73 M^2
GLUCOSE SERPL-MCNC: 324 MG/DL
POTASSIUM SERPL-SCNC: 4.5 MMOL/L
PROT SERPL-MCNC: 6.5 G/DL
SODIUM SERPL-SCNC: 138 MMOL/L

## 2017-04-03 PROCEDURE — 80053 COMPREHEN METABOLIC PANEL: CPT

## 2017-04-03 PROCEDURE — 87522 HEPATITIS C REVRS TRNSCRPJ: CPT

## 2017-04-03 PROCEDURE — 36415 COLL VENOUS BLD VENIPUNCTURE: CPT | Mod: PO

## 2017-04-04 ENCOUNTER — TELEPHONE (OUTPATIENT)
Dept: PULMONOLOGY | Facility: CLINIC | Age: 62
End: 2017-04-04

## 2017-04-04 NOTE — TELEPHONE ENCOUNTER
Spoke with Mirlande with Dr. Jackson's office. She is requesting clearance from visit on 3/30/17. Will over to her office.

## 2017-04-04 NOTE — TELEPHONE ENCOUNTER
----- Message from Lisa Judd sent at 4/4/2017 10:22 AM CDT -----  Contact: Mirlande/[facility]  Caller request call from nurse regarding pt medical clearance for dental treatment, please contact caller at [phone] and the fax number is [phone]

## 2017-04-05 ENCOUNTER — TELEPHONE (OUTPATIENT)
Dept: HEPATOLOGY | Facility: CLINIC | Age: 62
End: 2017-04-05

## 2017-04-05 DIAGNOSIS — Z86.19 HISTORY OF HEPATITIS C: ICD-10-CM

## 2017-04-05 LAB
HCV LOG: <1.08 LOG (10) IU/ML
HCV RNA QUANT PCR: <12 IU/ML
HCV, QUALITATIVE: NOT DETECTED IU/ML

## 2017-04-05 NOTE — TELEPHONE ENCOUNTER
HCV LAB REVIEW  Completed 12 weeks of Harvoni - 1/9/17  Radha 1b, tx naive, Stage 1 fibrosis  Post kidney transplant    Pertinent labs:  4/3/17  HCV neg     These labs document SVR12 following successful HCV treatment with Harvoni   This is consistent with a cure. Relapse is not expected.   No immunity is conferred and patient could be reinfected.     Pt has been notified       Please schedule labs - HCV RNA - SVR24 - 7/5/17

## 2017-04-07 ENCOUNTER — HOSPITAL ENCOUNTER (OUTPATIENT)
Facility: HOSPITAL | Age: 62
Discharge: HOME | End: 2017-04-08
Attending: EMERGENCY MEDICINE | Admitting: INTERNAL MEDICINE
Payer: MEDICARE

## 2017-04-07 DIAGNOSIS — R53.83 FATIGUE, UNSPECIFIED TYPE: ICD-10-CM

## 2017-04-07 DIAGNOSIS — Z94.0 KIDNEY TRANSPLANT RECIPIENT: Primary | ICD-10-CM

## 2017-04-07 DIAGNOSIS — R11.2 INTRACTABLE VOMITING WITH NAUSEA, UNSPECIFIED VOMITING TYPE: ICD-10-CM

## 2017-04-07 DIAGNOSIS — R11.2 NAUSEA AND VOMITING, INTRACTABILITY OF VOMITING NOT SPECIFIED, UNSPECIFIED VOMITING TYPE: ICD-10-CM

## 2017-04-07 LAB
ALBUMIN SERPL BCP-MCNC: 3.4 G/DL
ALBUMIN SERPL BCP-MCNC: 3.5 G/DL
ALP SERPL-CCNC: 77 U/L
ALP SERPL-CCNC: 78 U/L
ALT SERPL W/O P-5'-P-CCNC: 16 U/L
ALT SERPL W/O P-5'-P-CCNC: 16 U/L
ANION GAP SERPL CALC-SCNC: 10 MMOL/L
ANION GAP SERPL CALC-SCNC: 12 MMOL/L
ANION GAP SERPL CALC-SCNC: 12 MMOL/L
ANISOCYTOSIS BLD QL SMEAR: SLIGHT
APTT BLDCRRT: 25.4 SEC
AST SERPL-CCNC: 23 U/L
AST SERPL-CCNC: 23 U/L
BACTERIA #/AREA URNS HPF: NORMAL /HPF
BASOPHILS # BLD AUTO: 0.01 K/UL
BASOPHILS # BLD AUTO: ABNORMAL K/UL
BASOPHILS NFR BLD: 0 %
BASOPHILS NFR BLD: 0.2 %
BILIRUB SERPL-MCNC: 0.6 MG/DL
BILIRUB SERPL-MCNC: 0.9 MG/DL
BILIRUB UR QL STRIP: NEGATIVE
BNP SERPL-MCNC: 94 PG/ML
BUN SERPL-MCNC: 30 MG/DL
BUN SERPL-MCNC: 36 MG/DL
BUN SERPL-MCNC: 36 MG/DL
CALCIUM SERPL-MCNC: 8.7 MG/DL
CALCIUM SERPL-MCNC: 8.7 MG/DL
CALCIUM SERPL-MCNC: 9 MG/DL
CHLORIDE SERPL-SCNC: 101 MMOL/L
CHLORIDE SERPL-SCNC: 101 MMOL/L
CHLORIDE SERPL-SCNC: 99 MMOL/L
CK SERPL-CCNC: 104 U/L
CLARITY UR: CLEAR
CO2 SERPL-SCNC: 21 MMOL/L
CO2 SERPL-SCNC: 21 MMOL/L
CO2 SERPL-SCNC: 26 MMOL/L
COLOR UR: YELLOW
CREAT SERPL-MCNC: 1.5 MG/DL
CREAT SERPL-MCNC: 1.5 MG/DL
CREAT SERPL-MCNC: 1.6 MG/DL
DACRYOCYTES BLD QL SMEAR: ABNORMAL
DIFFERENTIAL METHOD: ABNORMAL
DIFFERENTIAL METHOD: ABNORMAL
EOSINOPHIL # BLD AUTO: 0 K/UL
EOSINOPHIL # BLD AUTO: ABNORMAL K/UL
EOSINOPHIL NFR BLD: 0.3 %
EOSINOPHIL NFR BLD: 1 %
ERYTHROCYTE [DISTWIDTH] IN BLOOD BY AUTOMATED COUNT: 13.1 %
ERYTHROCYTE [DISTWIDTH] IN BLOOD BY AUTOMATED COUNT: 13.4 %
EST. GFR  (AFRICAN AMERICAN): 52 ML/MIN/1.73 M^2
EST. GFR  (AFRICAN AMERICAN): 56 ML/MIN/1.73 M^2
EST. GFR  (AFRICAN AMERICAN): 56 ML/MIN/1.73 M^2
EST. GFR  (NON AFRICAN AMERICAN): 45 ML/MIN/1.73 M^2
EST. GFR  (NON AFRICAN AMERICAN): 49 ML/MIN/1.73 M^2
EST. GFR  (NON AFRICAN AMERICAN): 49 ML/MIN/1.73 M^2
GLUCOSE SERPL-MCNC: 124 MG/DL
GLUCOSE SERPL-MCNC: 185 MG/DL
GLUCOSE SERPL-MCNC: 185 MG/DL
GLUCOSE UR QL STRIP: NEGATIVE
HCT VFR BLD AUTO: 40.9 %
HCT VFR BLD AUTO: 42.8 %
HCT VFR BLD AUTO: 43.6 %
HGB BLD-MCNC: 13.6 G/DL
HGB BLD-MCNC: 14.2 G/DL
HGB BLD-MCNC: 14.2 G/DL
HGB UR QL STRIP: NEGATIVE
HYALINE CASTS #/AREA URNS LPF: 0 /LPF
INR PPP: 1
KETONES UR QL STRIP: NEGATIVE
LEUKOCYTE ESTERASE UR QL STRIP: NEGATIVE
LIPASE SERPL-CCNC: 12 U/L
LYMPHOCYTES # BLD AUTO: 1 K/UL
LYMPHOCYTES # BLD AUTO: ABNORMAL K/UL
LYMPHOCYTES NFR BLD: 16.3 %
LYMPHOCYTES NFR BLD: 21 %
MAGNESIUM SERPL-MCNC: 1.6 MG/DL
MCH RBC QN AUTO: 31.7 PG
MCH RBC QN AUTO: 31.9 PG
MCHC RBC AUTO-ENTMCNC: 32.6 %
MCHC RBC AUTO-ENTMCNC: 33.3 %
MCV RBC AUTO: 96 FL
MCV RBC AUTO: 97 FL
MICROSCOPIC COMMENT: NORMAL
MONOCYTES # BLD AUTO: 0.5 K/UL
MONOCYTES # BLD AUTO: ABNORMAL K/UL
MONOCYTES NFR BLD: 6 %
MONOCYTES NFR BLD: 7.6 %
NEUTROPHILS # BLD AUTO: 4.6 K/UL
NEUTROPHILS NFR BLD: 70 %
NEUTROPHILS NFR BLD: 75.6 %
NEUTS BAND NFR BLD MANUAL: 2 %
NITRITE UR QL STRIP: NEGATIVE
OB PNL GAST: POSITIVE
PH UR STRIP: 6 [PH] (ref 5–8)
PHOSPHATE SERPL-MCNC: 2.8 MG/DL
PLATELET # BLD AUTO: 176 K/UL
PLATELET # BLD AUTO: 208 K/UL
PMV BLD AUTO: 10 FL
PMV BLD AUTO: 10.3 FL
POCT GLUCOSE: 142 MG/DL (ref 70–110)
POIKILOCYTOSIS BLD QL SMEAR: SLIGHT
POTASSIUM SERPL-SCNC: 3.7 MMOL/L
POTASSIUM SERPL-SCNC: 5.1 MMOL/L
POTASSIUM SERPL-SCNC: 5.1 MMOL/L
PROT SERPL-MCNC: 6.6 G/DL
PROT SERPL-MCNC: 6.6 G/DL
PROT UR QL STRIP: ABNORMAL
PROTHROMBIN TIME: 10.8 SEC
RBC # BLD AUTO: 4.27 M/UL
RBC # BLD AUTO: 4.48 M/UL
RBC #/AREA URNS HPF: 2 /HPF (ref 0–4)
SODIUM SERPL-SCNC: 134 MMOL/L
SODIUM SERPL-SCNC: 134 MMOL/L
SODIUM SERPL-SCNC: 135 MMOL/L
SP GR UR STRIP: 1.02 (ref 1–1.03)
SPHEROCYTES BLD QL SMEAR: ABNORMAL
TROPONIN I SERPL DL<=0.01 NG/ML-MCNC: <0.006 NG/ML
URN SPEC COLLECT METH UR: ABNORMAL
UROBILINOGEN UR STRIP-ACNC: NEGATIVE EU/DL
WBC # BLD AUTO: 3.65 K/UL
WBC # BLD AUTO: 6.09 K/UL
WBC #/AREA URNS HPF: 1 /HPF (ref 0–5)

## 2017-04-07 PROCEDURE — 94640 AIRWAY INHALATION TREATMENT: CPT

## 2017-04-07 PROCEDURE — 25000003 PHARM REV CODE 250: Performed by: EMERGENCY MEDICINE

## 2017-04-07 PROCEDURE — G0378 HOSPITAL OBSERVATION PER HR: HCPCS

## 2017-04-07 PROCEDURE — 81000 URINALYSIS NONAUTO W/SCOPE: CPT

## 2017-04-07 PROCEDURE — 25000003 PHARM REV CODE 250: Performed by: INTERNAL MEDICINE

## 2017-04-07 PROCEDURE — 25000003 PHARM REV CODE 250: Performed by: NURSE PRACTITIONER

## 2017-04-07 PROCEDURE — 99285 EMERGENCY DEPT VISIT HI MDM: CPT | Mod: 25

## 2017-04-07 PROCEDURE — 80053 COMPREHEN METABOLIC PANEL: CPT | Mod: 91

## 2017-04-07 PROCEDURE — 80053 COMPREHEN METABOLIC PANEL: CPT

## 2017-04-07 PROCEDURE — 96361 HYDRATE IV INFUSION ADD-ON: CPT

## 2017-04-07 PROCEDURE — 96375 TX/PRO/DX INJ NEW DRUG ADDON: CPT

## 2017-04-07 PROCEDURE — 83690 ASSAY OF LIPASE: CPT

## 2017-04-07 PROCEDURE — 96365 THER/PROPH/DIAG IV INF INIT: CPT

## 2017-04-07 PROCEDURE — 85730 THROMBOPLASTIN TIME PARTIAL: CPT

## 2017-04-07 PROCEDURE — 83735 ASSAY OF MAGNESIUM: CPT

## 2017-04-07 PROCEDURE — 63600175 PHARM REV CODE 636 W HCPCS: Performed by: NURSE PRACTITIONER

## 2017-04-07 PROCEDURE — 83880 ASSAY OF NATRIURETIC PEPTIDE: CPT

## 2017-04-07 PROCEDURE — 85610 PROTHROMBIN TIME: CPT

## 2017-04-07 PROCEDURE — 63600175 PHARM REV CODE 636 W HCPCS: Performed by: EMERGENCY MEDICINE

## 2017-04-07 PROCEDURE — 82550 ASSAY OF CK (CPK): CPT

## 2017-04-07 PROCEDURE — 85018 HEMOGLOBIN: CPT

## 2017-04-07 PROCEDURE — 36415 COLL VENOUS BLD VENIPUNCTURE: CPT

## 2017-04-07 PROCEDURE — 93005 ELECTROCARDIOGRAM TRACING: CPT

## 2017-04-07 PROCEDURE — 85025 COMPLETE CBC W/AUTO DIFF WBC: CPT

## 2017-04-07 PROCEDURE — 84100 ASSAY OF PHOSPHORUS: CPT

## 2017-04-07 PROCEDURE — 84484 ASSAY OF TROPONIN QUANT: CPT

## 2017-04-07 PROCEDURE — 25000242 PHARM REV CODE 250 ALT 637 W/ HCPCS: Performed by: NURSE PRACTITIONER

## 2017-04-07 PROCEDURE — 93010 ELECTROCARDIOGRAM REPORT: CPT | Mod: ,,, | Performed by: INTERNAL MEDICINE

## 2017-04-07 PROCEDURE — 85007 BL SMEAR W/DIFF WBC COUNT: CPT

## 2017-04-07 PROCEDURE — 85027 COMPLETE CBC AUTOMATED: CPT

## 2017-04-07 PROCEDURE — 63600175 PHARM REV CODE 636 W HCPCS: Performed by: INTERNAL MEDICINE

## 2017-04-07 PROCEDURE — 82271 OCCULT BLOOD OTHER SOURCES: CPT

## 2017-04-07 PROCEDURE — 85014 HEMATOCRIT: CPT

## 2017-04-07 RX ORDER — SODIUM CHLORIDE 9 MG/ML
INJECTION, SOLUTION INTRAVENOUS CONTINUOUS
Status: DISCONTINUED | OUTPATIENT
Start: 2017-04-07 | End: 2017-04-08 | Stop reason: HOSPADM

## 2017-04-07 RX ORDER — OLANZAPINE 5 MG/1
5 TABLET ORAL DAILY
Status: DISCONTINUED | OUTPATIENT
Start: 2017-04-07 | End: 2017-04-08 | Stop reason: HOSPADM

## 2017-04-07 RX ORDER — ONDANSETRON 2 MG/ML
4 INJECTION INTRAMUSCULAR; INTRAVENOUS EVERY 12 HOURS PRN
Status: DISCONTINUED | OUTPATIENT
Start: 2017-04-07 | End: 2017-04-07

## 2017-04-07 RX ORDER — METOPROLOL TARTRATE 25 MG/1
25 TABLET, FILM COATED ORAL 2 TIMES DAILY
Status: DISCONTINUED | OUTPATIENT
Start: 2017-04-07 | End: 2017-04-08 | Stop reason: HOSPADM

## 2017-04-07 RX ORDER — ASPIRIN 81 MG/1
81 TABLET ORAL DAILY
Status: DISCONTINUED | OUTPATIENT
Start: 2017-04-08 | End: 2017-04-08 | Stop reason: HOSPADM

## 2017-04-07 RX ORDER — LANOLIN ALCOHOL/MO/W.PET/CERES
400 CREAM (GRAM) TOPICAL 2 TIMES DAILY
Status: DISCONTINUED | OUTPATIENT
Start: 2017-04-07 | End: 2017-04-08 | Stop reason: HOSPADM

## 2017-04-07 RX ORDER — PREDNISONE 10 MG/1
10 TABLET ORAL DAILY
COMMUNITY
End: 2017-11-30 | Stop reason: SDUPTHER

## 2017-04-07 RX ORDER — ARFORMOTEROL TARTRATE 15 UG/2ML
15 SOLUTION RESPIRATORY (INHALATION) EVERY 12 HOURS
Status: DISCONTINUED | OUTPATIENT
Start: 2017-04-07 | End: 2017-04-08 | Stop reason: HOSPADM

## 2017-04-07 RX ORDER — BUDESONIDE 0.5 MG/2ML
0.5 INHALANT ORAL EVERY 12 HOURS
Status: DISCONTINUED | OUTPATIENT
Start: 2017-04-07 | End: 2017-04-08 | Stop reason: HOSPADM

## 2017-04-07 RX ORDER — PANTOPRAZOLE SODIUM 40 MG/10ML
40 INJECTION, POWDER, LYOPHILIZED, FOR SOLUTION INTRAVENOUS DAILY
Status: DISCONTINUED | OUTPATIENT
Start: 2017-04-07 | End: 2017-04-07

## 2017-04-07 RX ORDER — TACROLIMUS 1 MG/1
4 CAPSULE ORAL EVERY MORNING
Status: DISCONTINUED | OUTPATIENT
Start: 2017-04-08 | End: 2017-04-08 | Stop reason: HOSPADM

## 2017-04-07 RX ORDER — RAMELTEON 8 MG/1
8 TABLET ORAL NIGHTLY PRN
Status: DISCONTINUED | OUTPATIENT
Start: 2017-04-07 | End: 2017-04-08 | Stop reason: HOSPADM

## 2017-04-07 RX ORDER — METOCLOPRAMIDE HYDROCHLORIDE 5 MG/ML
10 INJECTION INTRAMUSCULAR; INTRAVENOUS EVERY 8 HOURS PRN
Status: DISCONTINUED | OUTPATIENT
Start: 2017-04-07 | End: 2017-04-08 | Stop reason: HOSPADM

## 2017-04-07 RX ORDER — ATOVAQUONE 750 MG/5ML
1500 SUSPENSION ORAL DAILY
Status: DISCONTINUED | OUTPATIENT
Start: 2017-04-07 | End: 2017-04-07

## 2017-04-07 RX ORDER — INSULIN ASPART 100 [IU]/ML
0-5 INJECTION, SOLUTION INTRAVENOUS; SUBCUTANEOUS
Status: DISCONTINUED | OUTPATIENT
Start: 2017-04-07 | End: 2017-04-08 | Stop reason: HOSPADM

## 2017-04-07 RX ORDER — LEDIPASVIR AND SOFOSBUVIR 90; 400 MG/1; MG/1
1 TABLET, FILM COATED ORAL DAILY
Status: DISCONTINUED | OUTPATIENT
Start: 2017-04-08 | End: 2017-04-07

## 2017-04-07 RX ORDER — IPRATROPIUM BROMIDE AND ALBUTEROL SULFATE 2.5; .5 MG/3ML; MG/3ML
3 SOLUTION RESPIRATORY (INHALATION) EVERY 6 HOURS
Status: DISCONTINUED | OUTPATIENT
Start: 2017-04-07 | End: 2017-04-07

## 2017-04-07 RX ORDER — ONDANSETRON 2 MG/ML
4 INJECTION INTRAMUSCULAR; INTRAVENOUS EVERY 8 HOURS PRN
Status: DISCONTINUED | OUTPATIENT
Start: 2017-04-07 | End: 2017-04-08 | Stop reason: HOSPADM

## 2017-04-07 RX ORDER — IPRATROPIUM BROMIDE AND ALBUTEROL SULFATE 2.5; .5 MG/3ML; MG/3ML
3 SOLUTION RESPIRATORY (INHALATION) EVERY 6 HOURS
Status: DISCONTINUED | OUTPATIENT
Start: 2017-04-07 | End: 2017-04-08 | Stop reason: HOSPADM

## 2017-04-07 RX ORDER — FAMOTIDINE 20 MG/1
20 TABLET, FILM COATED ORAL NIGHTLY
Status: DISCONTINUED | OUTPATIENT
Start: 2017-04-07 | End: 2017-04-07

## 2017-04-07 RX ORDER — ATORVASTATIN CALCIUM 10 MG/1
10 TABLET, FILM COATED ORAL DAILY
Status: DISCONTINUED | OUTPATIENT
Start: 2017-04-07 | End: 2017-04-08 | Stop reason: HOSPADM

## 2017-04-07 RX ORDER — TACROLIMUS 1 MG/1
2 CAPSULE ORAL EVERY MORNING
Status: DISCONTINUED | OUTPATIENT
Start: 2017-04-08 | End: 2017-04-08 | Stop reason: HOSPADM

## 2017-04-07 RX ORDER — AMOXICILLIN 250 MG
1 CAPSULE ORAL 2 TIMES DAILY PRN
Status: DISCONTINUED | OUTPATIENT
Start: 2017-04-07 | End: 2017-04-08 | Stop reason: HOSPADM

## 2017-04-07 RX ORDER — IBUPROFEN 200 MG
16 TABLET ORAL
Status: DISCONTINUED | OUTPATIENT
Start: 2017-04-07 | End: 2017-04-08 | Stop reason: HOSPADM

## 2017-04-07 RX ORDER — LEDIPASVIR AND SOFOSBUVIR 90; 400 MG/1; MG/1
TABLET, FILM COATED ORAL DAILY
COMMUNITY
End: 2017-10-16 | Stop reason: ALTCHOICE

## 2017-04-07 RX ORDER — ROPINIROLE 0.25 MG/1
1 TABLET, FILM COATED ORAL 3 TIMES DAILY
Status: DISCONTINUED | OUTPATIENT
Start: 2017-04-07 | End: 2017-04-08 | Stop reason: HOSPADM

## 2017-04-07 RX ORDER — OXYCODONE HYDROCHLORIDE 5 MG/1
15 TABLET ORAL EVERY 6 HOURS PRN
Status: DISCONTINUED | OUTPATIENT
Start: 2017-04-07 | End: 2017-04-08 | Stop reason: HOSPADM

## 2017-04-07 RX ORDER — PREDNISONE 10 MG/1
10 TABLET ORAL DAILY
Status: DISCONTINUED | OUTPATIENT
Start: 2017-04-07 | End: 2017-04-08 | Stop reason: HOSPADM

## 2017-04-07 RX ORDER — POLYETHYLENE GLYCOL 3350 17 G/17G
17 POWDER, FOR SOLUTION ORAL 2 TIMES DAILY PRN
Status: DISCONTINUED | OUTPATIENT
Start: 2017-04-07 | End: 2017-04-08 | Stop reason: HOSPADM

## 2017-04-07 RX ORDER — ACETAMINOPHEN 325 MG/1
650 TABLET ORAL EVERY 6 HOURS PRN
Status: DISCONTINUED | OUTPATIENT
Start: 2017-04-07 | End: 2017-04-07

## 2017-04-07 RX ORDER — GLUCAGON 1 MG
1 KIT INJECTION
Status: DISCONTINUED | OUTPATIENT
Start: 2017-04-07 | End: 2017-04-08 | Stop reason: HOSPADM

## 2017-04-07 RX ORDER — ENOXAPARIN SODIUM 100 MG/ML
40 INJECTION SUBCUTANEOUS EVERY 24 HOURS
Status: DISCONTINUED | OUTPATIENT
Start: 2017-04-07 | End: 2017-04-07

## 2017-04-07 RX ORDER — PANTOPRAZOLE SODIUM 40 MG/10ML
40 INJECTION, POWDER, LYOPHILIZED, FOR SOLUTION INTRAVENOUS 2 TIMES DAILY
Status: DISCONTINUED | OUTPATIENT
Start: 2017-04-08 | End: 2017-04-08

## 2017-04-07 RX ORDER — IBUPROFEN 200 MG
24 TABLET ORAL
Status: DISCONTINUED | OUTPATIENT
Start: 2017-04-07 | End: 2017-04-08 | Stop reason: HOSPADM

## 2017-04-07 RX ORDER — ONDANSETRON 2 MG/ML
4 INJECTION INTRAMUSCULAR; INTRAVENOUS
Status: COMPLETED | OUTPATIENT
Start: 2017-04-07 | End: 2017-04-07

## 2017-04-07 RX ADMIN — ONDANSETRON 4 MG: 2 INJECTION INTRAMUSCULAR; INTRAVENOUS at 08:04

## 2017-04-07 RX ADMIN — SODIUM CHLORIDE: 0.9 INJECTION, SOLUTION INTRAVENOUS at 01:04

## 2017-04-07 RX ADMIN — MAGNESIUM OXIDE TAB 400 MG (241.3 MG ELEMENTAL MG) 400 MG: 400 (241.3 MG) TAB at 09:04

## 2017-04-07 RX ADMIN — METOPROLOL TARTRATE 25 MG: 25 TABLET ORAL at 09:04

## 2017-04-07 RX ADMIN — SODIUM CHLORIDE 1000 ML: 0.9 INJECTION, SOLUTION INTRAVENOUS at 11:04

## 2017-04-07 RX ADMIN — IPRATROPIUM BROMIDE AND ALBUTEROL SULFATE 3 ML: .5; 3 SOLUTION RESPIRATORY (INHALATION) at 08:04

## 2017-04-07 RX ADMIN — PROMETHAZINE HYDROCHLORIDE 6.25 MG: 25 INJECTION, SOLUTION INTRAMUSCULAR; INTRAVENOUS at 10:04

## 2017-04-07 RX ADMIN — OXYCODONE HYDROCHLORIDE 15 MG: 5 TABLET ORAL at 02:04

## 2017-04-07 RX ADMIN — OLANZAPINE 5 MG: 5 TABLET, FILM COATED ORAL at 05:04

## 2017-04-07 RX ADMIN — METOCLOPRAMIDE 10 MG: 5 INJECTION, SOLUTION INTRAMUSCULAR; INTRAVENOUS at 05:04

## 2017-04-07 RX ADMIN — ARFORMOTEROL TARTRATE 15 MCG: 15 SOLUTION RESPIRATORY (INHALATION) at 08:04

## 2017-04-07 RX ADMIN — ONDANSETRON 4 MG: 2 INJECTION INTRAMUSCULAR; INTRAVENOUS at 11:04

## 2017-04-07 RX ADMIN — PREDNISONE 10 MG: 10 TABLET ORAL at 04:04

## 2017-04-07 RX ADMIN — PROMETHAZINE HYDROCHLORIDE 12.5 MG: 25 INJECTION, SOLUTION INTRAMUSCULAR; INTRAVENOUS at 10:04

## 2017-04-07 RX ADMIN — BUDESONIDE 0.5 MG: 0.5 SUSPENSION RESPIRATORY (INHALATION) at 08:04

## 2017-04-07 RX ADMIN — ROPINIROLE HYDROCHLORIDE 1 MG: 0.25 TABLET, FILM COATED ORAL at 05:04

## 2017-04-07 RX ADMIN — ROPINIROLE HYDROCHLORIDE 1 MG: 0.25 TABLET, FILM COATED ORAL at 09:04

## 2017-04-07 RX ADMIN — RAMELTEON 8 MG: 8 TABLET, FILM COATED ORAL at 09:04

## 2017-04-07 NOTE — PROGRESS NOTES
Patient seen and examined. He vomited small amount coffee ground emesis. NP spoke with Dr. Tam, Nephrology, who was consulted. Dr. Tam recommended stopping cellcept and drawing tracrolimus level in the morning. He doesn't think N/V is due to transplant rejection. NP spoke with Dr. Albert, GI, who recommended BID PPI. Reevaluated patient in the am and call her if needed. She states not uncommon for dry heaves and vomiting to cause some GI coffee ground emesis. Will draw H/H q 12 hours. No lovenox - SCD for DVT prophylaxis. ppt

## 2017-04-07 NOTE — PROGRESS NOTES
Pt arrived to floor via stretcher. BS report received/  VS taken and assessment completed ,see flow sheets for detailed assessment. Pt placed on tele monitor.  No acute distress noted.Pt oriented to room service, call bell within reach, hourly rounding and fall prevention measures in placed.

## 2017-04-07 NOTE — PLAN OF CARE
04/07/17 1504   Discharge Assessment   Assessment Type Discharge Planning Assessment   Confirmed/corrected address and phone number on facesheet? Yes   Assessment information obtained from? Patient;Medical Record   Expected Length of Stay (days) (observation)   Communicated expected length of stay with patient/caregiver yes   Prior to hospitilization cognitive status: Alert/Oriented   Prior to hospitalization functional status: Independent   Current cognitive status: Alert/Oriented   Current Functional Status: Independent   Arrived From home or self-care   Lives With parent(s)  (Patient stated that he lives with his 84 yo mother. )   Able to Return to Prior Arrangements yes   Is patient able to care for self after discharge? Yes   Patient's perception of discharge disposition home or selfcare   Readmission Within The Last 30 Days no previous admission in last 30 days   Patient currently being followed by outpatient case management? No   Patient currently receives home health services? No   Does the patient currently use HME? No   Patient currently receives private duty nursing? No   Equipment Currently Used at Home power chair;wheelchair;walker, rolling;cane, straight  (Does not currently use. )   Do you have any problems affording any of your prescribed medications? No   Is the patient taking medications as prescribed? yes   Do you have any financial concerns preventing you from receiving the healthcare you need? No   Does the patient have transportation to healthcare appointments? Yes   Transportation Available family or friend will provide   On Dialysis? No  (kidney transplant 11 months ago)   Discharge Plan A Home   Discharge Plan B Home with family   Patient/Family In Agreement With Plan yes

## 2017-04-07 NOTE — IP AVS SNAPSHOT
[facility]  [id number] [place] [address] Dr Francheska H[place]ER [address]           Patient Discharge Instructions   Our goal is to set you up for success. This packet includes information on your condition, medications, and your home care.  It will help you care for yourself to prevent having to return to the hospital.     Please ask your nurse if you have any questions.      There are many details to remember when preparing to leave the hospital. Here is what you will need to do:    1. Take your medicine. If you are prescribed medications, review your Medication List on the following pages. You may have new medications to  at the pharmacy and others that you'll need to stop taking. Review the instructions for how and when to take your medications. Talk with your doctor or nurses if you are unsure of what to do.     2. Go to your follow-up appointments. Specific follow-up information is listed in the following pages. Your may be contacted by a nurse or clinical provider about future appointments. Be sure we have all of the phone numbers to reach you. Please contact your provider's office if you are unable to make an appointment.     3. Watch for warning signs. Your doctor or nurse will give you detailed warning signs to watch for and when to call for assistance. These instructions may also include educational information about your condition. If you experience any of warning signs to your health, call your doctor.               ** Verify the list of medication(s) below is accurate and up to date. Carry this with you in case of emergency. If your medications have changed, please notify your healthcare provider.             Medication List      CHANGE how you take these medications        Additional Info                      insulin  unit/mL injection   Commonly known as:  NOVOLIN N   Quantity:  1 vial   Refills:  11   What changed:  additional instructions    Instructions:  Take 25 units subq  with breakfast and 15 units at bedtime     Begin Date    AM    Noon    PM    Bedtime       insulin regular 100 unit/mL injection   Commonly known as:  NOVOLIN R   Quantity:  10 mL   Refills:  11   What changed:  additional instructions    Instructions:  Inject 6 units with breakfast and 8 units with dinner, subcutaneously 30 min before meal.     Begin Date    AM    Noon    PM    Bedtime       magnesium oxide 400 mg tablet   Commonly known as:  MAG-OX   Quantity:  60 tablet   Refills:  11   Dose:  400 mg   What changed:  when to take this    Last time this was given:  400 mg on 4/8/2017  9:12 AM   Instructions:  Take 1 tablet (400 mg total) by mouth 2 (two) times daily.     Begin Date    AM    Noon    PM    Bedtime       olanzapine 5 MG tablet   Commonly known as:  ZyPREXA   Quantity:  30 tablet   Refills:  11   Dose:  5 mg   What changed:  when to take this    Last time this was given:  5 mg on 4/8/2017  9:11 AM   Instructions:  Take 1 tablet (5 mg total) by mouth every evening.     Begin Date    AM    Noon    PM    Bedtime       tacrolimus 1 MG Cap   Commonly known as:  PROGRAF   Quantity:  150 capsule   Refills:  11   What changed:  additional instructions   Comments:  KTx 5/21/16; Z94.0    Last time this was given:  4 mg on 4/8/2017  8:11 AM   Instructions:  Take 3mg in the AM and 2mg in the PM by mouth. Z94.0 Kidney Transplant     Begin Date    AM    Noon    PM    Bedtime         CONTINUE taking these medications        Additional Info                      albuterol 90 mcg/actuation inhaler   Commonly known as:  VENTOLIN HFA   Quantity:  18 g   Refills:  3   Dose:  2 puff    Instructions:  Inhale 2 puffs into the lungs every 4 (four) hours as needed for Wheezing.     Begin Date    AM    Noon    PM    Bedtime       albuterol-ipratropium 2.5mg-0.5mg/3mL 0.5 mg-3 mg(2.5 mg base)/3 mL nebulizer solution   Commonly known as:  DUO-NEB   Quantity:  120 vial   Refills:  12   Dose:  3 mL    Last time this was given:  3  mLs on 4/8/2017 12:41 PM   Instructions:  Take 3 mLs by nebulization every 6 (six) hours.     Begin Date    AM    Noon    PM    Bedtime       aspirin 81 MG EC tablet   Commonly known as:  ECOTRIN   Quantity:  30 tablet   Refills:  0   Dose:  81 mg    Last time this was given:  81 mg on 4/8/2017  9:12 AM   Instructions:  Take 1 tablet (81 mg total) by mouth once daily.     Begin Date    AM    Noon    PM    Bedtime       atovaquone 750 mg/5 mL Susp   Commonly known as:  MEPRON   Quantity:  300 mL   Refills:  7   Dose:  1500 mg    Instructions:  Take 10 mLs (1,500 mg total) by mouth once daily.     Begin Date    AM    Noon    PM    Bedtime       BD INSULIN SYRINGE ULTRA-FINE 1 mL 31 gauge x 5/16 Syrg   Refills:  0   Generic drug:  insulin syringe-needle U-100      Begin Date    AM    Noon    PM    Bedtime       blood sugar diagnostic Strp   Quantity:  100 each   Refills:  11   Dose:  1 each    Instructions:  1 each by Misc.(Non-Drug; Combo Route) route 3 (three) times daily.     Begin Date    AM    Noon    PM    Bedtime       blood-glucose meter kit   Quantity:  1 each   Refills:  0    Instructions:  Use as instructed     Begin Date    AM    Noon    PM    Bedtime       budesonide-formoterol 160-4.5 mcg 160-4.5 mcg/actuation Hfaa   Commonly known as:  SYMBICORT   Quantity:  1 Inhaler   Refills:  12   Dose:  2 puff    Instructions:  Inhale 2 puffs into the lungs every 12 (twelve) hours. Wash out mouth after using     Begin Date    AM    Noon    PM    Bedtime       ergocalciferol 50,000 unit Cap   Commonly known as:  ERGOCALCIFEROL   Quantity:  4 capsule   Refills:  11   Dose:  80718 Units    Instructions:  Take 1 capsule (50,000 Units total) by mouth every 7 days. Take on Mondays     Begin Date    AM    Noon    PM    Bedtime       ERTAPENEM (INVANZ) 1 G/100 ML NS (READY TO MIX)   Refills:  0   Dose:  1 g   Indications:  Bacteremia    Instructions:  Inject 100 mLs (1 g total) into the vein once daily.     Begin Date    AM  "   Noon    PM    Bedtime       famotidine 20 MG tablet   Commonly known as:  PEPCID   Quantity:  30 tablet   Refills:  11   Dose:  20 mg    Instructions:  Take 1 tablet (20 mg total) by mouth every evening.     Begin Date    AM    Noon    PM    Bedtime       inhalation spacing device   Commonly known as:  BREATHERITE VALVED MDI CHAMBER   Quantity:  1 Device   Refills:  prn    Instructions:  Use as directed for inhalation.     Begin Date    AM    Noon    PM    Bedtime       insulin syringe,safetyneedle 1 mL 31 gauge x 5/16" Syrg   Quantity:  120 Syringe   Refills:  11   Dose:  1 Syringe    Instructions:  1 Syringe by Misc.(Non-Drug; Combo Route) route 4 (four) times daily with meals and nightly.     Begin Date    AM    Noon    PM    Bedtime       lancets Misc   Quantity:  100 each   Refills:  11   Dose:  1 each    Instructions:  1 each by Misc.(Non-Drug; Combo Route) route 3 (three) times daily.     Begin Date    AM    Noon    PM    Bedtime       ledipasvir-sofosbuvir  mg Tab   Refills:  0    Instructions:  Take by mouth once daily.     Begin Date    AM    Noon    PM    Bedtime       metoprolol tartrate 50 MG tablet   Commonly known as:  LOPRESSOR   Quantity:  30 tablet   Refills:  11   Dose:  25 mg    Last time this was given:  25 mg on 4/8/2017  9:12 AM   Instructions:  Take 0.5 tablets (25 mg total) by mouth 2 (two) times daily.     Begin Date    AM    Noon    PM    Bedtime       multivitamin tablet   Commonly known as:  THERAGRAN   Quantity:  30 tablet   Refills:  11   Dose:  1 tablet    Instructions:  Take 1 tablet by mouth once daily.     Begin Date    AM    Noon    PM    Bedtime       mycophenolate 250 mg Cap   Commonly known as:  CELLCEPT   Quantity:  120 capsule   Refills:  11   Dose:  500 mg    Instructions:  Take 2 capsules (500 mg total) by mouth 2 (two) times daily. Z94.0 Kidney Transplant 5/21/2016.     Begin Date    AM    Noon    PM    Bedtime       ondansetron 4 MG Tbdl   Commonly known as:  " "ZOFRAN-ODT   Quantity:  12 tablet   Refills:  0   Dose:  8 mg    Instructions:  Take 2 tablets (8 mg total) by mouth every 8 (eight) hours as needed (nausea).     Begin Date    AM    Noon    PM    Bedtime       * oxycodone 15 MG Tab   Commonly known as:  ROXICODONE   Quantity:  20 tablet   Refills:  0   Dose:  15 mg    Last time this was given:  15 mg on 4/7/2017  2:47 PM   Instructions:  Take 1 tablet (15 mg total) by mouth every 6 (six) hours as needed for Pain.     Begin Date    AM    Noon    PM    Bedtime       * oxycodone 10 mg Tab immediate release tablet   Commonly known as:  ROXICODONE   Refills:  0    Last time this was given:  15 mg on 4/7/2017  2:47 PM     Begin Date    AM    Noon    PM    Bedtime       * pen needle, diabetic 32 gauge x 5/32" Ndle   Commonly known as:  EASY COMFORT PEN NEEDLES   Quantity:  100 each   Refills:  11   Dose:  1 each    Instructions:  Inject 1 each into the skin 3 (three) times daily.     Begin Date    AM    Noon    PM    Bedtime       * pen needle, diabetic 31 gauge x 1/4" Ndle   Quantity:  100 each   Refills:  0   Dose:  1 each    Instructions:  1 each by Misc.(Non-Drug; Combo Route) route 4 (four) times daily.     Begin Date    AM    Noon    PM    Bedtime       * BD INSULIN PEN NEEDLE UF SHORT 31 gauge x 5/16" Ndle   Refills:  0   Generic drug:  pen needle, diabetic      Begin Date    AM    Noon    PM    Bedtime       predniSONE 10 MG tablet   Commonly known as:  DELTASONE   Refills:  0   Dose:  10 mg    Last time this was given:  10 mg on 4/8/2017  9:12 AM   Instructions:  Take 10 mg by mouth once daily.     Begin Date    AM    Noon    PM    Bedtime       sodium bicarbonate 650 MG tablet   Quantity:  240 tablet   Refills:  11    Instructions:  Take 4 tablets BID  four hours before or after Harvoni     Begin Date    AM    Noon    PM    Bedtime       tramadol 100 MG Tb24   Commonly known as:  ULTRAM-ER   Refills:  0   Dose:  100 mg   Indications:  Chronic Pain    " Instructions:  Take 100 mg by mouth as needed.     Begin Date    AM    Noon    PM    Bedtime       * Notice:  This list has 5 medication(s) that are the same as other medications prescribed for you. Read the directions carefully, and ask your doctor or other care provider to review them with you.      STOP taking these medications     methylPREDNISolone 4 mg tablet   Commonly known as:  MEDROL DOSEPACK                  Please bring to all follow up appointments:    1. A copy of your discharge instructions.  2. All medicines you are currently taking in their original bottles.  3. Identification and insurance card.    Please arrive 15 minutes ahead of scheduled appointment time.    Please call 24 hours in advance if you must reschedule your appointment and/or time.        Your Scheduled Appointments     Apr 11, 2017  8:35 AM CDT   Fasting Lab with [facility]    [address]  [place] [address]   [phone]            Apr 18, 2017  9:30 AM CDT   Diabetes Ed Follow-Up with Dana Morillo RD, CDE   [facility] ([facility])    [address]  [place] [address]   [phone]            May 08, 2017  9:10 AM CDT   Fasting Lab with [facility], [facility]    [address] [facility] [address]   [phone]            May 08, 2017 10:10 AM CDT   Urine with SPECIMEN, [facility]    [address] [facility] [address]   [phone]            May 10, 2017  2:00 PM CDT   Established Patient with MD uKmar Vasquez marcelina- Transplant ([facility] )    [address]  [facility] [address] [phone]              Follow-up Information     Follow up with Rishabh Esteban MD In 3 days.    Specialty:  Family Medicine    Why:  hospital follow up     Contact information:    [address]  "[place] [address]  [phone]          Follow up with Charlette Estrada MD In 1 week.    Specialty:  Neurology    Why:  hospital follow up     Contact information:    [address] [place] [address] [phone]          Follow up with Valeri Albert MD In 1 week.    Specialty:  Gastroenterology    Why:  hospital follow up for N/V    Contact information:    [address] [place] [address] [phone]          Discharge Instructions     Future Orders    Activity as tolerated     Call MD for:  difficulty breathing or increased cough     Call MD for:  increased confusion or weakness     Call MD for:  persistent dizziness, light-headedness, or visual disturbances     Call MD for:  persistent nausea and vomiting or diarrhea     Call MD for:  severe persistent headache     Call MD for:  severe uncontrolled pain     Call MD for:  temperature >100.4     Call MD for:  worsening rash     Diet general     Questions:    Total calories:      Fat restriction, if any:      Protein restriction, if any:      Na restriction, if any:  2gNa    Fluid restriction:      Additional restrictions:  Renal    Diabetic 1800        Primary Diagnosis     Your primary diagnosis was:  Kidney Transplant Recipient      Admission Information     Date & Time Provider Department CSN    4/7/2017  9:13 AM Shelton Edgar MD [facility] -  [id number]      Care Providers     Provider Role Specialty Primary office phone    Shelton Edgar MD Attending Provider Internal Medicine [phone]    Shelton Edgar MD Team Attending  Internal Medicine [phone]    Bud Tam MD Consulting Physician  Nephrology [phone]      Your Vitals Were     BP Pulse Temp Resp Height Weight    142/86 (BP Location: Right arm, Patient Position: Lying, BP Method: Automatic) 81 98.2 °F (36.8 °C) (Oral) 20 5' 10" (1.778 m) 96.6 kg (213 lb)    SpO2 BMI             95% 30.56 kg/m2         Recent Lab Values        10/12/2013 3/18/2014 " 2/19/2015 5/22/2016 7/16/2016 9/24/2016 12/1/2016 3/20/2017      7:14 PM  6:50 AM  5:57 PM  1:06 PM  7:21 PM  4:22 AM  5:10 AM  8:21 AM    A1C 5.6 5.3 4.1 (L) 4.4 (L) 7.4 (H) 11.4 (H) 10.5 (H) 9.0 (H)    Comment for A1C at  7:21 PM on 7/16/2016:  According to ADA guidelines, hemoglobin A1C <7.0% represents  optimal control in non-pregnant diabetic patients.  Different  metrics may apply to specific populations.   Standards of Medical Care in Diabetes - 2016.  For the purpose of screening for the presence of diabetes:  <5.7%     Consistent with the absence of diabetes  5.7-6.4%  Consistent with increasing risk for diabetes   (prediabetes)  >or=6.5%  Consistent with diabetes  Currently no consensus exists for use of hemoglobin A1C  for diagnosis of diabetes for children.      Comment for A1C at  4:22 AM on 9/24/2016:  According to ADA guidelines, hemoglobin A1C <7.0% represents  optimal control in non-pregnant diabetic patients.  Different  metrics may apply to specific populations.   Standards of Medical Care in Diabetes - 2016.  For the purpose of screening for the presence of diabetes:  <5.7%     Consistent with the absence of diabetes  5.7-6.4%  Consistent with increasing risk for diabetes   (prediabetes)  >or=6.5%  Consistent with diabetes  Currently no consensus exists for use of hemoglobin A1C  for diagnosis of diabetes for children.      Comment for A1C at  5:10 AM on 12/1/2016:  According to ADA guidelines, hemoglobin A1C <7.0% represents  optimal control in non-pregnant diabetic patients.  Different  metrics may apply to specific populations.   Standards of Medical Care in Diabetes - 2016.  For the purpose of screening for the presence of diabetes:  <5.7%     Consistent with the absence of diabetes  5.7-6.4%  Consistent with increasing risk for diabetes   (prediabetes)  >or=6.5%  Consistent with diabetes  Currently no consensus exists for use of hemoglobin A1C  for diagnosis of diabetes for children.       Comment for A1C at  8:21 AM on 3/20/2017:  According to ADA guidelines, hemoglobin A1C <7.0% represents  optimal control in non-pregnant diabetic patients.  Different  metrics may apply to specific populations.   Standards of Medical Care in Diabetes - 2016.  For the purpose of screening for the presence of diabetes:  <5.7%     Consistent with the absence of diabetes  5.7-6.4%  Consistent with increasing risk for diabetes   (prediabetes)  >or=6.5%  Consistent with diabetes  Currently no consensus exists for use of hemoglobin A1C  for diagnosis of diabetes for children.        Pending Labs     Order Current Status    Tacrolimus level In process      Allergies as of 4/8/2017        Reactions    Tylenol [Acetaminophen]     Told not to take per Transplant Team      [facility]     [facility] Nurse Care Line - 24/7 Assistance  Unless otherwise directed by your provider, please contact [facility], our nurse care line that is available for 24/7 assistance.     Registered nurses in the [facility] provide clinical advisement, health education, appointment booking, and other advisory services.  Call for this free service at [phone].        Advance Directives     An advance directive is a document which, in the event you are no longer able to make decisions for yourself, tells your healthcare team what kind of treatment you do or do not want to receive, or who you would like to make those decisions for you.  If you do not currently have an advance directive, [facility] encourages you to create one.  For more information call:  [phone]), [phone] ([phone]),  or log on to [email or web address].        Smoking Cessation     If you would like to quit smoking:   You may be eligible for free services if you are a [place] resident and started smoking cigarettes before September 1, 1988.  Call the Smoking Cessation Trust (SCT) toll free at (894) 409-4344 or (907)  518-6540.   Call 1-800-QUIT-NOW if you do not meet the above criteria.   Contact us via email: [email or web address]   View our website for more information: [email or web address]        Language Assistance Services     ATTENTION: Language assistance services are available, free of charge. Please call [phone].      ATENCIÓN: Si habla artemio, tiene a hollis disposición servicios gratuitos de asistencia lingüística. Llame al [phone].     CHÚ Ý: N?u b?n nói Ti?ng Vi?t, có các d?ch v? h? tr? ngôn ng? mi?n phí dành cho b?n. G?i s? [phone].        Heart Failure Education       Heart Failure: Being Active  You have a condition called heart failure. Being active doesnt mean that you have to wear yourself out. Even a little movement each day helps to strengthen your heart. If you cant get out to exercise, you can do simple stretching and strengthening exercises at home. These are good ways to keep you well-conditioned and prevent you and your heart from becoming excessively weak.    Ideas to get you started  · Add a little movement to things you do now. Walk to mail letters. Park your car at the far end of the parking lot and walk to the store. Walk up a flight of stairs instead of taking the elevator.  · Choose activities you enjoy. You might walk, swim, or ride an exercise bike. Things like gardening and washing the car count, too. Other possibilities include: washing dishes, walking the dog, walking around the mall, and doing aerobic activities with friends.  · Join a group exercise program at a [facility] or [facility], a senior center, or a community center. Or look into a hospital cardiac rehabilitation program. Ask your doctor if you qualify.  Tips to keep you going  · Get up and get dressed each day. Go to a coffee shop and read a newspaper or go somewhere that you'll be in the presence of other active people. Youll feel more like being active.  · Make a plan. Choose one or more activities that  you enjoy and that you can easily do. Then plan to do at least one each day. You might write your plan on a calendar.  · Go with a friend or a group if you like company. This can help you feel supported and stay motivated, too.  · Plan social events that you enjoy. This will keep you mentally engaged as well as physically motivated to do things you find pleasure in.  For your safety  · Talk with your healthcare provider before starting an exercise program.  · Exercise indoors when its too hot or too cold outside, or when the air quality is poor. Try walking at a shopping mall.  · Wear socks and sturdy shoes to maintain your balance and prevent falls.  · Start slowly. Do a few minutes several times a day at first. Increase your time and speed little by little.  · Stop and rest whenever you feel tired or get short of breath.  · Dont push yourself on days when you dont feel well.  Date Last Reviewed: 3/20/2016  © 2669-4598 [employer]. [address] [place] [address]. All rights reserved. This information is not intended as a substitute for professional medical care. Always follow your healthcare professional's instructions.              Heart Failure: Evaluating Your Heart  You have a condition called heart failure. To evaluate your condition, your doctor will examine you, ask questions, and do some tests. Along with looking for signs of heart failure, the doctor looks for any other health problems that may have led to heart failure. The results of your evaluation will help your doctor form a treatment plan.  Health history and physical exam  Your visit will start with a health history. Tell the doctor about any symptoms youve noticed and about all medicines you take. Then youll have a physical exam. This includes listening to your heartbeat and breathing. Youll also be checked for swelling (edema) in your legs and neck. When you have fluid buildup or fluid in the lungs, it may be called  congestive heart failure.  Diagnosing heart failure     During an echocardiogram, sound waves bounce off the heart. These are converted into a picture on the screen.   The following may be done to help your doctor form a diagnosis:  · X-rays show the size and shape of your heart. These pictures can also show fluid in your lungs.  · An electrocardiogram (ECG or EKG) shows the pattern of your heartbeat. Small pads (electrodes) are placed on your chest, arms, and legs. Wires connect the pads to the ECG machine, which records your hearts electrical signals. This can give the doctor information about heart function.  · An echocardiogram uses ultrasound waves to show the structure and movement of your heart muscle. This shows how well the heart pumps. It also shows the thickness of the heart walls, and if the heart is enlarged. It is one of the most useful, non-invasive tests as it provides information about the heart's general function. This helps your doctor make treatment decisions.  · Lab tests evaluate small amounts of blood or urine for signs of problems. A BNP lab test can help diagnose and evaluate heart failure. BNP stands for B-type natriuretic peptide. The ventricles secrete more BNP when heart failure worsens. Lab tests can also provide information about metabolic dysfunction or heart dysfunction.  Your treatment plan  Based on the results of your evaluation and tests, your doctor will develop a treatment plan. This plan is designed to relieve some of your heart failure symptoms and help make you more comfortable. Your treatment plan may include:  · Medicine to help your heart work better and improve your quality of life  · Changes in what you eat and drink to help prevent fluid from backing up in your body  · Daily monitoring of your weight and heart failure symptoms to see how well your treatment plan is working  · Exercise to help you stay healthy  · Help with quitting smoking  · Emotional and  psychological support to help adjust to the changes  · Referrals to other specialists to make sure you are being treated comprehensively  Date Last Reviewed: 3/21/2016  © 2262-2371 [employer]. [address] [place] [address], [place] [address]. All rights reserved. This information is not intended as a substitute for professional medical care. Always follow your healthcare professional's instructions.              Heart Failure: Making Changes to Your Diet  You have a condition called heart failure. When you have heart failure, excess fluid is more likely to build up in your body because your heart isn't working well. This makes the heart work harder to pump blood. Fluid buildup causes symptoms such as shortness of breath and swelling (edema). This is often referred to as congestive heart failure or CHF. Controlling the amount of salt (sodium) you eat may help stop fluid from building up. Your doctor may also tell you to reduce the amount of fluid you drink.  Reading food labels    Your healthcare provider will tell you how much sodium you can eat each day. Read food labels to keep track. Keep in mind that certain foods are high in salt. These include canned, frozen, and processed foods. Check the amount of sodium in each serving. Watch out for high-sodium ingredients. These include MSG (monosodium glutamate), baking soda, and sodium phosphate.   Eating less salt  Give yourself time to get used to eating less salt. It may take a little while. Here are some tips to help:  · Take the saltshaker off the table. Replace it with salt-free herb mixes and spices.  · Eat fresh or plain frozen vegetables. These have much less salt than canned vegetables.  · Choose low-sodium snacks like sodium-free pretzels, crackers, or air-popped popcorn.  · Dont add salt to your food when youre cooking. Instead, season your foods with pepper, lemon, garlic, or onion.  · When you eat out, ask that your food be cooked without added  salt.  · Avoid eating fried foods as these often have a great deal of salt.  If youre told to limit fluids  You may need to limit how much fluid you have to help prevent swelling. This includes anything that is liquid at room temperature, such as ice cream and soup. If your doctor tells you to limit fluid, try these tips:  · Measure drinks in a measuring cup before you drink them. This will help you meet daily goals.  · Chill drinks to make them more refreshing.  · Suck on frozen lemon wedges to quench thirst.  · Only drink when youre thirsty.  · Chew sugarless gum or suck on hard candy to keep your mouth moist.  · Weigh yourself daily to know if your body's fluid content is rising.  My sodium goal  Your healthcare provider may give you a sodium goal to meet each day. This includes sodium found in food as well as salt that you add. My goal is to eat no more than ___________ mg of sodium per day.     When to call your doctor  Call your doctor right away if you have any symptoms of worsening heart failure. These can include:  · Sudden weight gain  · Increased swelling of your legs or ankles  · Trouble breathing when youre resting or at night  · Increase in the number of pillows you have to sleep on  · Chest pain, pressure, discomfort, or pain in the jaw, neck, or back   Date Last Reviewed: 3/21/2016  © 3523-9259 [employer]. [address] [place] [address], [place] [address]. All rights reserved. This information is not intended as a substitute for professional medical care. Always follow your healthcare professional's instructions.              Heart Failure: Medicines to Help Your Heart    You have a condition called heart failure (also known as congestive heart failure, or CHF). Your doctor will likely prescribe medicines for heart failure and any underlying health problems you have. Most heart failure patients take one or more types of medicinen. Your healthcare provider will work to find the combination  of medicines that works best for you.  Heart failure medicines  Here are the most common heart failure medicines:  · ACE inhibitors lower blood pressure and decrease strain on the heart. This makes it easier for the heart to pump. Angiotensin receptor blockers have similar effects. These are prescribed for some patients instead of ACE inhibitors.  · Beta-blockers relieve stress on the heart. They also improve symptoms. They may also improve the heart's pumping action over time.  · Diuretics (also called water pills) help rid your body of excess water. This can help rid your body of swelling (edema). Having less fluid to pump means your heart doesnt have to work as hard. Some diuretics make your body lose a mineral called potassium. Your doctor will tell you if you need to take supplements or eat more foods high in potassium.  · Digoxin helps your heart pump with more strength. This helps your heart pump more blood with each beat. So, more oxygen-rich blood travels to the rest of the body.  · Aldosterone antagonists help alter hormones and decrease strain on the heart.  · Hydralazine and nitrates are two separate medicines used together to treat heart failure. They may come in one combination pill. They lower blood pressure and decrease how hard the heart has to pump.  Medicines for related conditions  Controlling other heart problems helps keep heart failure under control, too. Depending on other heart problems you have, medicines may be prescribed to:  · Lower blood pressure (antihypertensives).  · Lower cholesterol levels (statins).  · Prevent blood clots (anticoagulants or aspirin).  · Keep the heartbeat steady (antiarrhythmics).  Date Last Reviewed: 3/5/2016  © 0139-3105 [employer]. [address] [place] [address], [place] [address]. All rights reserved. This information is not intended as a substitute for professional medical care. Always follow your healthcare professional's instructions.               Heart Failure: Procedures That May Help    The heart is a muscle that pumps oxygen-rich blood to all parts of the body. When you have heart failure, the heart is not able to pump as well as it should. Blood and fluid may back up into the lungs (congestive heart failure), and some parts of the body dont get enough oxygen-rich blood to work normally. These problems lead to the symptoms of heart failure.     Certain procedures may help the heart pump better in some cases of heart failure. Some procedures are done to treat health problems that may have caused the heart failure such as coronary artery disease or heart rhythm problems. For more serious heart failure, other options are available.  Treating artery and valve problems  If you have coronary artery disease or valve disease, procedures may be done to improve blood flow. This helps the heart pump better, which can improve heart failure symptoms. First, your doctor may do a cardiac catheterization to help detect clogged blood vessels or valve damage. During this procedure, a  thin tube (catheter) in inserted into a blood vessel and guided to the heart. There a dye is injected and a special type of X-ray (angiogram) is taken of the blood vessels. Procedures to open a blocked artery or fix damaged valves can also be done using catheterization.  · Angioplasty uses a balloon-tipped instrument at the end of the catheter. The balloon is inflated to widen the narrowed artery. In many cases, a stent is expanded to further support the narrowed artery. A stent is a metal mesh tube.  · Valve surgery repairs or replacement of faulty valves can also be done during catheterization so blood can flow properly through the chambers of the heart.  Bypass surgery is another option to help treat blocked arteries. It uses a healthy blood vessel from elsewhere in the body. The healthy blood vessel is attached above and below the blocked area so that blood can flow around the blocked  artery.  Treating heart rhythm problems  A device may be placed in the chest to help a weak heart maintain a healthy, heartbeat so the heart can pump more effectively:  · Pacemaker. A pacemaker is an implanted device that regulates your heartbeat electronically. It monitors your heart's rhythm and generates a painless electric impulse that helps the heart beat in a regular rhythm. A pacemaker is programmed to meet your specific heart rhythm needs.  · Biventricular pacing/cardiac resynchronization therapy. A type of pacemaker that paces both pumping chambers of the heart at the same time to coordinate contractions and to improve the heart's function. Some people with heart failure are candidates for this therapy.  · Implantable cardioverter defibrillator. A device similar to a pacemaker that senses when the heart is beating too fast and delivers an electrical shock to convert the fast rhythm to a normal rhythm. This can be a life saving device.  In severe cases  In more serious cases of heart failure when other treatments no longer work, other options may include:  · Ventricular assist devices (VADs). These are mechanical devices used to take over the pumping function for one or both of the heart's ventricles, or pumping chambers. A VAD may be necessary when heart failure progresses to the point that medicines and other treatments no longer help. In some cases, a VAD may be used as a bridge to transplant.  · Heart transplant. This is replacing the diseased heart with a healthy one from a donor. This is an option for a few people who are very sick. A heart transplant is very serious and not an option for all patients. Your doctor can tell you more.  Date Last Reviewed: 3/20/2016  © 4766-4873 [employer]. [address] [place] [address], [place] [address]. All rights reserved. This information is not intended as a substitute for professional medical care. Always follow your healthcare professional's  instructions.              Heart Failure: Tracking Your Weight  You have a condition called heart failure. When you have heart failure, a sudden weight gain or a steady rise in weight is a warning sign that your body is retaining too much water and salt. This could mean your heart failure is getting worse. If left untreated, it can cause problems for your lungs and result in shortness of breath. Weighing yourself each day is the best way to know if youre retaining water. If your weight goes up quickly, call your doctor. You will be given instructions on how to get rid of the excess water. You will likely need medicines and to avoid salt. This will help your heart work better.  Call your doctor if you gain more than 2 pounds in 1 day, more than 5 pounds in 1 week, or whatever weight gain you were told to report by your doctor. This is often a sign of worsening heart failure and needs to be evaluated and treated. Your doctor will tell you what to do next.   Tips for weighing yourself    · Weigh yourself at the same time each morning, wearing the same clothes. Weigh yourself after urinating and before eating.  · Use the same scale each day. Make sure the numbers are easy to read. Put the scale on a flat, hard surface -- not on a rug or carpet.  · Do not stop weighing yourself. If you forget one day, weigh again the next morning.  How to use your weight chart  · Keep your weight chart near the scale. Write your weight on the chart as soon as you get off the scale.  · Fill in the month and the start date on the chart. Then write down your weight each day. Your chart will look like this:    · If you miss a day, leave the space blank. Weigh yourself the next day and write your weight in the next space.  · Take your weight chart with you when you go to see your doctor.  Date Last Reviewed: 3/20/2016  © 5964-5300 [employer]. [address] [place] [address], [place] [address]. All rights reserved. This information is  not intended as a substitute for professional medical care. Always follow your healthcare professional's instructions.              Heart Failure: Warning Signs of a Flare-Up  You have a condition called heart failure. Once you have heart failure, flare-ups can happen. Below are signs that can mean your heart failure is getting worse. If you notice any of these warning signs, call your healthcare provider.  Swelling    · Your feet, ankles, or lower legs get puffier.  · You notice skin changes on your lower legs.  · Your shoes feel too tight.  · Your clothes are tighter in the waist.  · You have trouble getting rings on or off your fingers.  Shortness of breath  · You have to breathe harder even when youre doing your normal activities or when youre resting.  · You are short of breath walking up stairs or even short distances.  · You wake up at night short of breath or coughing.  · You need to use more pillows or sit up to sleep.  · You wake up tired or restless.  Other warning signs  · You feel weaker, dizzy, or more tired.  · You have chest pain or changes in your heartbeat.  · You have a cough that wont go away.  · You cant remember things or dont feel like eating.  Tracking your weight  Gaining weight is often the first warning sign that heart failure is getting worse. Gaining even a few pounds can be a sign that your body is retaining excess water and salt. Weighing yourself each day in the morning after you urinate and before you eat, is the best way to know if you're retaining water. Get a scale that is easy to read and make sure you wear the same clothes and use the same scale every time you weigh. Your healthcare provider will show you how to track your weight. Call your doctor if you gain more than 2 pounds in 1 day, 5 pounds in 1 week, or whatever weight gain you were told to report by your doctor. This is often a sign of worsening heart failure and needs to be evaluated and treated before it  compromises your breathing. Your doctor will tell you what to do next.    Date Last Reviewed: 3/15/2016  © 5473-6266 [employer]. [address] [place] [address], [place] [address]. All rights reserved. This information is not intended as a substitute for professional medical care. Always follow your healthcare professional's instructions.              Chronic [place]ey Disease Education             Diabetes Discharge Instructions                                    [facility] complies with applicable Federal civil rights laws and does not discriminate on the basis of race, color, national origin, age, disability, or sex.

## 2017-04-07 NOTE — H&P
[facility] Medicine  History & Physical    Patient Name: Lavelle Ladd  MRN: [id number]  Admission Date: 2017  Attending Physician: Shelton Edgar MD   Primary Care Provider: Rishabh Esteban MD         Patient information was obtained from patient and ER records.     Subjective:     Principal Problem:Kidney transplant recipient    Chief Complaint:   Chief Complaint   Patient presents with    Fatigue     weakness and vomiting.  pt had kidney transplant 11 months ago        HPI: Lavelle Ladd is a 61 y.o. male with PMHx of kidney transplant, HLP, HTN, who presented to the ER for N/V which onset suddenly this AM. Symptoms are episodic and moderate in severity. Associated sxs included fatigue, generalized weakness, and malaise. No other sxs reported. He reported vomiting/dry heaving over 10 x over night. [facility] transplant MD was called by ER. Urinalysis normal. Creatinine 1.6. Patient will be admitted to Observation for IV fluids and anti-emetics. He will be reassessed in the morning.    Past Medical History:   Diagnosis Date    DINORAH (acute kidney injury) 2016    Arthritis     Chronic obstructive pulmonary disease 2016    Coronary artery disease involving native coronary artery of native heart without angina pectoris 2016     donor kidney transplant for DM 16     Induction with Thymo x3 and IV solumedrol to total 875mg  Kidney Biopsy  2016: 16 glomeruli, ACR type 1 AVR type 2, significant microcirculatory changes, c4d negative, No DSA, 5 to10% fibrosis. Treated with thymo x8 2016- no rejection      Diastolic heart failure     DM2 (diabetes mellitus, type 2)     Encounter for blood transfusion     ESRD on RRT since 10/2013 10/29/2013    Biopsy proven diabetic nephropathy and lymphoplasmacytic interstitial infiltrate not c/w with AIN (ddx sjogrens or assoc with tamm-horsefall protein extravasation)     GERD (gastroesophageal reflux  "disease)     History of hepatitis C, s/p successful Rx w/ SVR12 - 4/2017 4/5/2017    Completed 12 weeks harvoni w/ SVR    Hyperlipidemia     Hypertension     Prophylactic immunotherapy     Proteinuria     Reactive airway disease     Renal hypertension     Type 2 diabetes mellitus with diabetic neuropathy, with long-term current use of insulin 12/1/2016    Type 2 diabetes mellitus with hyperglycemia, with long-term current use of insulin     Type 2 diabetes mellitus with retinopathy, with long-term current use of insulin 12/1/2016    Vitamin B12 deficiency        Past Surgical History:   Procedure Laterality Date    av bovine graft      Left UE    AV FISTULA PLACEMENT      left UE    CARDIAC CATHETERIZATION  02/2015    KIDNEY TRANSPLANT  05/21/2016    LEG AMPUTATION THROUGH KNEE  2011    right LE, started as nail puncture leading to diabetic ulcer       Review of patient's allergies indicates:   Allergen Reactions    Tylenol [acetaminophen]      Told not to take per Transplant Team       No current facility-administered medications on file prior to encounter.      Current Outpatient Prescriptions on File Prior to Encounter   Medication Sig    aspirin (ECOTRIN) 81 MG EC tablet Take 1 tablet (81 mg total) by mouth once daily.    atovaquone (MEPRON) 750 mg/5 mL Susp Take 10 mLs (1,500 mg total) by mouth once daily.    BD INSULIN PEN NEEDLE UF SHORT 31 gauge x 5/16" Ndle     BD INSULIN SYRINGE ULTRA-FINE 1 mL 31 gauge x 5/16 Syrg     blood sugar diagnostic Strp 1 each by Misc.(Non-Drug; Combo Route) route 3 (three) times daily.    blood-glucose meter kit Use as instructed    budesonide-formoterol 160-4.5 mcg (SYMBICORT) 160-4.5 mcg/actuation HFAA Inhale 2 puffs into the lungs every 12 (twelve) hours. Wash out mouth after using    famotidine (PEPCID) 20 MG tablet Take 1 tablet (20 mg total) by mouth every evening.    inhalation device (BREATHERITE VALVED MDI CHAMBER) Use as directed for " "inhalation.    insulin NPH (NOVOLIN N) 100 unit/mL injection Take 25 units subq with breakfast and 15 units at bedtime (Patient taking differently: Take 20 units subq with breakfast and 8 units at bedtime)    insulin regular (NOVOLIN R) 100 unit/mL Inj injection Inject 6 units with breakfast and 8 units with dinner, subcutaneously 30 min before meal. (Patient taking differently: Inject 10 units with breakfast and 8 units with dinner, subcutaneously 30 min before meal.)    insulin syringe,safetyneedle 1 mL 31 gauge x 5/16" Syrg 1 Syringe by Misc.(Non-Drug; Combo Route) route 4 (four) times daily with meals and nightly.    lancets Misc 1 each by Misc.(Non-Drug; Combo Route) route 3 (three) times daily.    magnesium oxide (MAG-OX) 400 mg tablet Take 1 tablet (400 mg total) by mouth 2 (two) times daily. (Patient taking differently: Take 400 mg by mouth once daily. )    metoprolol tartrate (LOPRESSOR) 50 MG tablet Take 0.5 tablets (25 mg total) by mouth 2 (two) times daily.    mycophenolate (CELLCEPT) 250 mg Cap Take 2 capsules (500 mg total) by mouth 2 (two) times daily. Z94.0 Kidney Transplant 5/21/2016.    olanzapine (ZYPREXA) 5 MG tablet Take 1 tablet (5 mg total) by mouth every evening. (Patient taking differently: Take 5 mg by mouth once daily. )    ondansetron (ZOFRAN-ODT) 4 MG TbDL Take 2 tablets (8 mg total) by mouth every 8 (eight) hours as needed (nausea).    pen needle, diabetic (EASY COMFORT PEN NEEDLES) 32 gauge x 5/32" Ndle Inject 1 each into the skin 3 (three) times daily.    pen needle, diabetic 31 gauge x 1/4" Ndle 1 each by Misc.(Non-Drug; Combo Route) route 4 (four) times daily.    sodium bicarbonate 650 MG tablet Take 4 tablets BID  four hours before or after Harvoni    tacrolimus (PROGRAF) 1 MG Cap Take 3mg in the AM and 2mg in the PM by mouth. Z94.0 Kidney Transplant (Patient taking differently: Take 4mg in the AM and 2mg in the PM by mouth. Z94.0 Kidney Transplant)    " albuterol (VENTOLIN HFA) 90 mcg/actuation inhaler Inhale 2 puffs into the lungs every 4 (four) hours as needed for Wheezing.    albuterol-ipratropium 2.5mg-0.5mg/3mL (DUO-NEB) 0.5 mg-3 mg(2.5 mg base)/3 mL nebulizer solution Take 3 mLs by nebulization every 6 (six) hours.    ergocalciferol (ERGOCALCIFEROL) 50,000 unit Cap Take 1 capsule (50,000 Units total) by mouth every 7 days. Take on Mondays    ERTAPENEM SODIUM (ERTAPENEM, INVANZ, 1 G/100 ML NS, READY TO MIX,) Inject 100 mLs (1 g total) into the vein once daily.    methylPREDNISolone (MEDROL DOSEPACK) 4 mg tablet use as directed    multivitamin (THERAGRAN) tablet Take 1 tablet by mouth once daily.    oxycodone (ROXICODONE) 10 mg Tab immediate release tablet     oxycodone (ROXICODONE) 15 MG Tab Take 1 tablet (15 mg total) by mouth every 6 (six) hours as needed for Pain.    tramadol (ULTRAM-ER) 100 MG Tb24 Take 100 mg by mouth as needed.     Family History     Problem Relation (Age of Onset)    Cancer Father    Diabetes Father    Heart failure Father, Mother    Stroke Father        Social History Main Topics    Smoking status: Former Smoker     Packs/day: 1.00     Years: 40.00     Quit date: 1/11/2013    Smokeless tobacco: Never Used    Alcohol use No    Drug use: No    Sexual activity: No     Review of Systems   Constitutional: Positive for fatigue. Negative for activity change, appetite change, chills, diaphoresis, fever and unexpected weight change.        Malaise   HENT: Negative for congestion, ear discharge, ear pain, facial swelling, hearing loss, postnasal drip, sore throat, tinnitus, trouble swallowing and voice change.    Eyes: Negative for photophobia, pain, discharge, redness, itching and visual disturbance.   Respiratory: Negative for cough, choking, chest tightness, shortness of breath, wheezing and stridor.    Cardiovascular: Negative for chest pain, palpitations and leg swelling.   Gastrointestinal: Positive for nausea and vomiting.  Negative for abdominal distention, abdominal pain, blood in stool, constipation and diarrhea.   Endocrine: Negative for cold intolerance, heat intolerance, polydipsia, polyphagia and polyuria.   Genitourinary: Negative for difficulty urinating, flank pain, frequency, hematuria and urgency.   Musculoskeletal: Negative for arthralgias, back pain, gait problem and myalgias.   Skin: Negative for color change, pallor, rash and wound.   Neurological: Positive for weakness. Negative for dizziness, tremors, seizures, syncope, facial asymmetry, speech difficulty, light-headedness, numbness and headaches.   Hematological: Negative for adenopathy. Does not bruise/bleed easily.   Psychiatric/Behavioral: Negative for agitation, confusion, hallucinations and suicidal ideas. The patient is not nervous/anxious.    All other systems reviewed and are negative.    Objective:     Vital Signs (Most Recent):  Temp: 98.1 °F (36.7 °C) (04/07/17 1331)  Pulse: 79 (04/07/17 1331)  Resp: 16 (04/07/17 1331)  BP: (!) 162/87 (04/07/17 1331)  SpO2: 95 % (04/07/17 1331) Vital Signs (24h Range):  Temp:  [98.1 °F (36.7 °C)-98.7 °F (37.1 °C)] 98.1 °F (36.7 °C)  Pulse:  [71-81] 79  Resp:  [14-18] 16  SpO2:  [95 %-100 %] 95 %  BP: (132-201)/(27-90) 162/87     Weight: 96.6 kg (213 lb)  Body mass index is 30.56 kg/(m^2).    Physical Exam   Constitutional: He is oriented to person, place, and time. He appears well-developed and well-nourished.   HENT:   Head: Normocephalic and atraumatic.   Nose: Nose normal.   Eyes: Conjunctivae and EOM are normal. Pupils are equal, round, and reactive to light.   Neck: Normal range of motion. Neck supple. No JVD present. No tracheal deviation present. No thyromegaly present.   Cardiovascular: Normal rate, regular rhythm, normal heart sounds and intact distal pulses.  Exam reveals no gallop and no friction rub.    No murmur heard.  Pulmonary/Chest: Effort normal. No stridor. No respiratory distress. He has no wheezes. He  has no rales. He exhibits no tenderness.   Abdominal: Soft. Bowel sounds are normal. He exhibits no distension. There is no tenderness. There is no rebound and no guarding.   Musculoskeletal: Right BKA. Normal range of motion. He exhibits no edema, tenderness or deformity.   Neurological: He is alert and oriented to person, place, and time. He has normal reflexes. No cranial nerve deficit. Coordination normal.   Skin: Skin is warm and dry. No rash noted. No erythema. No pallor.   Psychiatric: He has a normal mood and affect. His behavior is normal. Judgment and thought content normal.   Nursing note and vitals reviewed.       Significant Labs:   CBC:   Recent Labs  Lab 04/07/17  0950   WBC 3.65*   HGB 13.6*   HCT 40.9        CMP:   Recent Labs  Lab 04/07/17  0950   *   K 3.7   CL 99   CO2 26   *   BUN 30*   CREATININE 1.6*   CALCIUM 9.0   PROT 6.6   ALBUMIN 3.5   BILITOT 0.6   ALKPHOS 77   AST 23   ALT 16   ANIONGAP 10   EGFRNONAA 45*       Significant Imaging: I have reviewed all pertinent imaging results/findings within the past 24 hours.   Imaging Results         X-Ray Chest AP Portable (Final result) Result time:  04/07/17 10:11:20    Final result by ANA CRISTINA Joseph Sr., MD (04/07/17 10:11:20)    Impression:        1.  The lungs are clear.  2.  There is a mild amount of atherosclerosis.      Electronically signed by: ANA CRISTINA JOSEPH MD  Date:     04/07/17  Time:    10:11     Narrative:    One-view chest x-ray    Clinical History: Weakness    Finding: Comparison was made to a prior examination performed on 3/30/2017.  The size of the heart is normal. The lungs are clear. There is no pneumothorax.  The costophrenic angles are sharp.  There is a mild amount of atherosclerosis.            Assessment/Plan:     * Kidney transplant recipient  Resume transplant meds      Intractable vomiting with nausea  -antiemetics  -IV Fluids  -clear liquids      VTE Risk Mitigation         Ordered     enoxaparin  injection 40 mg  Daily     Route:  Subcutaneous        04/07/17 1636     Medium Risk of VTE  Once      04/07/17 1636        Reanna Pavon NP  Department of Hospital Medicine   [facility]

## 2017-04-07 NOTE — ED PROVIDER NOTES
SCRIBE #1 NOTE: I, Jose Jordan, am scribing for, and in the presence of, Judd Rasmussen MD. I have scribed the entire note.      History      Chief Complaint   Patient presents with    Fatigue     weakness and vomiting.  pt had kidney transplant 11 months ago       Review of patient's allergies indicates:   Allergen Reactions    Tylenol [acetaminophen]      Told not to take per Transplant Team        HPI   HPI    2017, 9:13 AM   History obtained from the patient      History of Present Illness: Lavelle Ladd is a 61 y.o. male patient who presents to the Emergency Department for N/V which onset suddenly this AM. Symptoms are episodic and moderate in severity. Sx are exacerbated by nothing and relieved by nothing. Associated sxs include fatigue, generalized weakness, and malaise. No other sxs reported. Pt reports Hx of kidney transplant 11 months ago. Patient denies any fever, chills, abd pain, numbness, lightheadedness, dizziness, HA, dysuria, difficulty urinating, hematuria, urinary frequency/urgency, CP, SOB, palpitations, leg swelling and all other sxs at this time. No further complaints or concerns at this time.     Arrival mode: EMS    PCP: Rishabh Esteban MD       Past Medical History:  Past Medical History:   Diagnosis Date    DINORAH (acute kidney injury) 2016    Arthritis     Chronic obstructive pulmonary disease 2016    Coronary artery disease involving native coronary artery of native heart without angina pectoris 2016     donor kidney transplant for DM 16     Induction with Thymo x3 and IV solumedrol to total 875mg  Kidney Biopsy  2016: 16 glomeruli, ACR type 1 AVR type 2, significant microcirculatory changes, c4d negative, No DSA, 5 to10% fibrosis. Treated with thymo x8 2016- no rejection      Diastolic heart failure     DM2 (diabetes mellitus, type 2)     Encounter for blood transfusion     ESRD on RRT since 10/2013 10/29/2013    Biopsy proven  diabetic nephropathy and lymphoplasmacytic interstitial infiltrate not c/w with AIN (ddx sjogrens or assoc with tamm-horsefall protein extravasation)     GERD (gastroesophageal reflux disease)     History of hepatitis C, s/p successful Rx w/ SVR12 - 4/2017 4/5/2017    Completed 12 weeks harvoni w/ SVR    Hyperlipidemia     Hypertension     Prophylactic immunotherapy     Proteinuria     Reactive airway disease     Renal hypertension     Type 2 diabetes mellitus with diabetic neuropathy, with long-term current use of insulin 12/1/2016    Type 2 diabetes mellitus with hyperglycemia, with long-term current use of insulin     Type 2 diabetes mellitus with retinopathy, with long-term current use of insulin 12/1/2016    Vitamin B12 deficiency        Past Surgical History:  Past Surgical History:   Procedure Laterality Date    av bovine graft      Left UE    AV FISTULA PLACEMENT      left UE    CARDIAC CATHETERIZATION  02/2015    KIDNEY TRANSPLANT      LEG AMPUTATION THROUGH KNEE  2011    right LE, started as nail puncture leading to diabetic ulcer         Family History:  Family History   Problem Relation Age of Onset    Cancer Father     Diabetes Father     Heart failure Father     Stroke Father     Heart failure Mother     Kidney disease Neg Hx        Social History:  Social History     Social History Main Topics    Smoking status: Former Smoker     Packs/day: 1.00     Years: 40.00     Quit date: 1/11/2013    Smokeless tobacco: Never Used    Alcohol use No    Drug use: No    Sexual activity: No       ROS   Review of Systems   Constitutional: Positive for fatigue. Negative for chills and fever.        (+)generalized weakness (+)malaise   HENT: Negative for congestion and sore throat.    Respiratory: Negative for chest tightness and shortness of breath.    Cardiovascular: Negative for chest pain.   Gastrointestinal: Positive for nausea and vomiting. Negative for abdominal pain, constipation and  diarrhea.   Genitourinary: Negative for decreased urine volume, difficulty urinating, dysuria, frequency, hematuria and urgency.   Musculoskeletal: Negative for back pain and neck pain.   Skin: Negative for rash.   Neurological: Negative for dizziness, numbness and headaches.   Psychiatric/Behavioral: Negative for agitation and confusion.   All other systems reviewed and are negative.      Physical Exam      Vitals:    04/07/17 1147 04/07/17 1152 04/07/17 1155 04/07/17 1250   BP: (!) 201/70 (!) 173/61 (!) 164/57 (!) 172/48   BP Location: Left arm Left arm Left arm    Patient Position: Lying Sitting Standing    BP Method: Automatic Automatic Automatic    Pulse: 81 77 81 77   Resp:    14   Temp:       TempSrc:       SpO2: 100% 100% 100% 99%       Physical Exam  Nursing Notes and Vital Signs Reviewed.  Constitutional: Patient is in no apparent distress. Awake and alert. Well-developed and well-nourished.  Head: Atraumatic. Normocephalic.  Eyes: PERRL. EOM intact. Conjunctivae are not pale. No scleral icterus.  ENT: Mucous membranes are moist. Oropharynx is clear and symmetric.    Neck: Supple. Full ROM. No lymphadenopathy.  Cardiovascular: Regular rate. Regular rhythm. No murmurs, rubs, or gallops. Distal pulses are 2+ and symmetric.  Pulmonary/Chest: No respiratory distress. Clear to auscultation bilaterally. No wheezing, rales, or rhonchi.  Abdominal: Soft and non-distended.  There is no tenderness.  No rebound, guarding, or rigidity. Good bowel sounds.  Musculoskeletal: Moves all extremities. R leg amputation noted. No edema. No calf tenderness.  Skin: Warm and dry.  Neurological:  Alert, awake, and appropriate.  Normal speech.  No acute focal neurological deficits are appreciated.  Psychiatric: Normal affect. Good eye contact. Appropriate in content.    ED Course    Procedures  ED Vital Signs:  Vitals:    04/07/17 0915 04/07/17 0945 04/07/17 1015 04/07/17 1030   BP: (!) 180/90  (!) 132/29 (!) 133/27   Pulse: 75 73  73 71   Resp: 18  14 16   Temp: 98.7 °F (37.1 °C)      TempSrc: Oral      SpO2: 98%  100% 100%    04/07/17 1147 04/07/17 1152 04/07/17 1155 04/07/17 1250   BP: (!) 201/70 (!) 173/61 (!) 164/57 (!) 172/48   Pulse: 81 77 81 77   Resp:    14   Temp:       TempSrc:       SpO2: 100% 100% 100% 99%       Abnormal Lab Results:  Labs Reviewed   CBC W/ AUTO DIFFERENTIAL - Abnormal; Notable for the following:        Result Value    WBC 3.65 (*)     RBC 4.27 (*)     Hemoglobin 13.6 (*)     MCH 31.9 (*)     All other components within normal limits   COMPREHENSIVE METABOLIC PANEL - Abnormal; Notable for the following:     Sodium 135 (*)     Glucose 124 (*)     BUN, Bld 30 (*)     Creatinine 1.6 (*)     eGFR if  52 (*)     eGFR if non  45 (*)     All other components within normal limits   URINALYSIS - Abnormal; Notable for the following:     Protein, UA 1+ (*)     All other components within normal limits   B-TYPE NATRIURETIC PEPTIDE   CK   TROPONIN I   URINALYSIS MICROSCOPIC        All Lab Results:  Results for orders placed or performed during the hospital encounter of 04/07/17   CBC auto differential   Result Value Ref Range    WBC 3.65 (L) 3.90 - 12.70 K/uL    RBC 4.27 (L) 4.60 - 6.20 M/uL    Hemoglobin 13.6 (L) 14.0 - 18.0 g/dL    Hematocrit 40.9 40.0 - 54.0 %    MCV 96 82 - 98 fL    MCH 31.9 (H) 27.0 - 31.0 pg    MCHC 33.3 32.0 - 36.0 %    RDW 13.1 11.5 - 14.5 %    Platelets 176 150 - 350 K/uL    MPV 10.0 9.2 - 12.9 fL    Lymph # CANCELED 1.0 - 4.8 K/uL    Mono # CANCELED 0.3 - 1.0 K/uL    Eos # CANCELED 0.0 - 0.5 K/uL    Baso # CANCELED 0.00 - 0.20 K/uL    Gran% 70.0 38.0 - 73.0 %    Lymph% 21.0 18.0 - 48.0 %    Mono% 6.0 4.0 - 15.0 %    Eosinophil% 1.0 0.0 - 8.0 %    Basophil% 0.0 0.0 - 1.9 %    Bands 2.0 %    Aniso Slight     Poik Slight     Tear Drop Cells Occasional     Spherocytes Occasional     Differential Method Manual    Comprehensive metabolic panel   Result Value Ref Range     Sodium 135 (L) 136 - 145 mmol/L    Potassium 3.7 3.5 - 5.1 mmol/L    Chloride 99 95 - 110 mmol/L    CO2 26 23 - 29 mmol/L    Glucose 124 (H) 70 - 110 mg/dL    BUN, Bld 30 (H) 8 - 23 mg/dL    Creatinine 1.6 (H) 0.5 - 1.4 mg/dL    Calcium 9.0 8.7 - 10.5 mg/dL    Total Protein 6.6 6.0 - 8.4 g/dL    Albumin 3.5 3.5 - 5.2 g/dL    Total Bilirubin 0.6 0.1 - 1.0 mg/dL    Alkaline Phosphatase 77 55 - 135 U/L    AST 23 10 - 40 U/L    ALT 16 10 - 44 U/L    Anion Gap 10 8 - 16 mmol/L    eGFR if African American 52 (A) >60 mL/min/1.73 m^2    eGFR if non African American 45 (A) >60 mL/min/1.73 m^2   Urinalysis   Result Value Ref Range    Specimen UA Urine, Clean Catch     Color, UA Yellow Yellow, Straw, Jessa    Appearance, UA Clear Clear    pH, UA 6.0 5.0 - 8.0    Specific Gravity, UA 1.020 1.005 - 1.030    Protein, UA 1+ (A) Negative    Glucose, UA Negative Negative    Ketones, UA Negative Negative    Bilirubin (UA) Negative Negative    Occult Blood UA Negative Negative    Nitrite, UA Negative Negative    Urobilinogen, UA Negative <2.0 EU/dL    Leukocytes, UA Negative Negative   Brain natriuretic peptide   Result Value Ref Range    BNP 94 0 - 99 pg/mL   CK   Result Value Ref Range     20 - 200 U/L   Troponin I   Result Value Ref Range    Troponin I <0.006 0.000 - 0.026 ng/mL   Urinalysis Microscopic   Result Value Ref Range    RBC, UA 2 0 - 4 /hpf    WBC, UA 1 0 - 5 /hpf    Bacteria, UA Rare None-Occ /hpf    Hyaline Casts, UA 0 0-1/lpf /lpf    Microscopic Comment SEE COMMENT          Imaging Results:  Imaging Results         X-Ray Chest AP Portable (Final result) Result time:  04/07/17 10:11:20    Final result by ANA CRISTINA Joseph Sr., MD (04/07/17 10:11:20)    Impression:        1.  The lungs are clear.  2.  There is a mild amount of atherosclerosis.      Electronically signed by: ANA CRISTINA JOSEPH MD  Date:     04/07/17  Time:    10:11     Narrative:    One-view chest x-ray    Clinical History: Weakness    Finding: Comparison  was made to a prior examination performed on 3/30/2017.  The size of the heart is normal. The lungs are clear. There is no pneumothorax.  The costophrenic angles are sharp.  There is a mild amount of atherosclerosis.               The EKG was ordered, reviewed, and independently interpreted by the ED provider.  Interpretation time: 0924  Rate: 73 BPM  Rhythm: normal sinus rhythm  Interpretation: Left axis deviation. No STEMI.         The Emergency Provider reviewed the vital signs and test results, which are outlined above.    ED Discussion     11:48 PM: When pt was about to get the CT done he started having N/V, unable to get the CT done at this time.    12:06 PM: Re-evaluated pt. Informed pt and family that there are no transplant services available at this time. I have discussed test results, shared treatment plan, and the need for transfer with patient. All historical, clinical, radiographic, and laboratory findings were reviewed with the patient/family in detail. Patient will be transferred by [facility] with cardiac monitoring care required en route. Patient understands that there could be unforeseen motor vehicle accidents or loss of vital signs that could result in potential death or permanent disability. Pt express understanding at this time and agree with all information. All questions answered. Pt has no further questions or concerns at this time. Pt is ready for transfer.    12:11 PM: Dr. Rasmussen discussed the pt's case with transplant team who recommends keeping him here at [facility] in [place] and placing him in observation. Transplant team recommends giving pt fluids and antiemetics.    12:22 PM: Re-evaluated pt. I have discussed test results, shared treatment plan, and the need for admission to observation here in [place]. Pt express understanding at this time and agree with all information. All questions answered. Pt has no further questions or concerns at this time. Pt is ready for  admit.    12:28 PM: Discussed case with Carola ([facility]). Dr. Edgar agrees with current care and management of pt and accepts admission.   Admitting Service: Hospital medicine   Admitting Physician: Dr. Edgar  Admit to: Obs        ED Medication(s):  Medications   promethazine (PHENERGAN) 12.5 mg in dextrose 5 % 50 mL IVPB (0 mg Intravenous Stopped 4/7/17 1101)   sodium chloride 0.9% bolus 1,000 mL (0 mLs Intravenous Stopped 4/7/17 1258)   ondansetron injection 4 mg (4 mg Intravenous Given 4/7/17 1128)       New Prescriptions    No medications on file             Medical Decision Making    Medical Decision Making:   Clinical Tests:   Lab Tests: Reviewed and Ordered  Radiological Study: Reviewed and Ordered  Medical Tests: Ordered and Reviewed           Scribe Attestation:   Scribe #1: I performed the above scribed service and the documentation accurately describes the services I performed. I attest to the accuracy of the note.    Attending:   Physician Attestation Statement for Scribe #1: I, Judd Rasmussen MD, personally performed the services described in this documentation, as scribed by Jose Jordan, in my presence, and it is both accurate and complete.          Clinical Impression       ICD-10-CM ICD-9-CM   1. Nausea and vomiting, intractability of vomiting not specified, unspecified vomiting type R11.2 787.01   2. Kidney transplant recipient Z94.0 V42.0   3. Fatigue, unspecified type R53.83 780.79       Disposition:   Disposition: Placed in Observation  Condition: Stable         Judd Rasmussen MD  04/07/17 1310

## 2017-04-07 NOTE — PLAN OF CARE
Problem: Patient Care Overview  Goal: Plan of Care Review  Outcome: Ongoing (interventions implemented as appropriate)  Patient remains free from falls, fall precaution in place. Assist x1.   VS stable. Patient nauseated and vomit. Specimen sent to lab. IV infusing. Chronic back pain. PRN pain med given.  Call bell and belongings within reach, reminded to call for assistance.

## 2017-04-07 NOTE — SUBJECTIVE & OBJECTIVE
Past Medical History:   Diagnosis Date    DINORAH (acute kidney injury) 2016    Arthritis     Chronic obstructive pulmonary disease 2016    Coronary artery disease involving native coronary artery of native heart without angina pectoris 2016     donor kidney transplant for DM 16     Induction with Thymo x3 and IV solumedrol to total 875mg  Kidney Biopsy  2016: 16 glomeruli, ACR type 1 AVR type 2, significant microcirculatory changes, c4d negative, No DSA, 5 to10% fibrosis. Treated with thymo x8 2016- no rejection      Diastolic heart failure     DM2 (diabetes mellitus, type 2)     Encounter for blood transfusion     ESRD on RRT since 10/2013 10/29/2013    Biopsy proven diabetic nephropathy and lymphoplasmacytic interstitial infiltrate not c/w with AIN (ddx sjogrens or assoc with tamm-horsefall protein extravasation)     GERD (gastroesophageal reflux disease)     History of hepatitis C, s/p successful Rx w/ SVR12 - 2017    Completed 12 weeks harvoni w/ SVR    Hyperlipidemia     Hypertension     Prophylactic immunotherapy     Proteinuria     Reactive airway disease     Renal hypertension     Type 2 diabetes mellitus with diabetic neuropathy, with long-term current use of insulin 2016    Type 2 diabetes mellitus with hyperglycemia, with long-term current use of insulin     Type 2 diabetes mellitus with retinopathy, with long-term current use of insulin 2016    Vitamin B12 deficiency        Past Surgical History:   Procedure Laterality Date    av bovine graft      Left UE    AV FISTULA PLACEMENT      left UE    CARDIAC CATHETERIZATION  2015    KIDNEY TRANSPLANT  2016    LEG AMPUTATION THROUGH KNEE      right LE, started as nail puncture leading to diabetic ulcer       Review of patient's allergies indicates:   Allergen Reactions    Tylenol [acetaminophen]      Told not to take per Transplant Team       No current  "facility-administered medications on file prior to encounter.      Current Outpatient Prescriptions on File Prior to Encounter   Medication Sig    aspirin (ECOTRIN) 81 MG EC tablet Take 1 tablet (81 mg total) by mouth once daily.    atovaquone (MEPRON) 750 mg/5 mL Susp Take 10 mLs (1,500 mg total) by mouth once daily.    BD INSULIN PEN NEEDLE UF SHORT 31 gauge x 5/16" Ndle     BD INSULIN SYRINGE ULTRA-FINE 1 mL 31 gauge x 5/16 Syrg     blood sugar diagnostic Strp 1 each by Misc.(Non-Drug; Combo Route) route 3 (three) times daily.    blood-glucose meter kit Use as instructed    budesonide-formoterol 160-4.5 mcg (SYMBICORT) 160-4.5 mcg/actuation HFAA Inhale 2 puffs into the lungs every 12 (twelve) hours. Wash out mouth after using    famotidine (PEPCID) 20 MG tablet Take 1 tablet (20 mg total) by mouth every evening.    inhalation device (BREATHERITE VALVED MDI CHAMBER) Use as directed for inhalation.    insulin NPH (NOVOLIN N) 100 unit/mL injection Take 25 units subq with breakfast and 15 units at bedtime (Patient taking differently: Take 20 units subq with breakfast and 8 units at bedtime)    insulin regular (NOVOLIN R) 100 unit/mL Inj injection Inject 6 units with breakfast and 8 units with dinner, subcutaneously 30 min before meal. (Patient taking differently: Inject 10 units with breakfast and 8 units with dinner, subcutaneously 30 min before meal.)    insulin syringe,safetyneedle 1 mL 31 gauge x 5/16" Syrg 1 Syringe by Misc.(Non-Drug; Combo Route) route 4 (four) times daily with meals and nightly.    lancets Misc 1 each by Misc.(Non-Drug; Combo Route) route 3 (three) times daily.    magnesium oxide (MAG-OX) 400 mg tablet Take 1 tablet (400 mg total) by mouth 2 (two) times daily. (Patient taking differently: Take 400 mg by mouth once daily. )    metoprolol tartrate (LOPRESSOR) 50 MG tablet Take 0.5 tablets (25 mg total) by mouth 2 (two) times daily.    mycophenolate (CELLCEPT) 250 mg Cap Take 2 " "capsules (500 mg total) by mouth 2 (two) times daily. Z94.0 Kidney Transplant 5/21/2016.    olanzapine (ZYPREXA) 5 MG tablet Take 1 tablet (5 mg total) by mouth every evening. (Patient taking differently: Take 5 mg by mouth once daily. )    ondansetron (ZOFRAN-ODT) 4 MG TbDL Take 2 tablets (8 mg total) by mouth every 8 (eight) hours as needed (nausea).    pen needle, diabetic (EASY COMFORT PEN NEEDLES) 32 gauge x 5/32" Ndle Inject 1 each into the skin 3 (three) times daily.    pen needle, diabetic 31 gauge x 1/4" Ndle 1 each by Misc.(Non-Drug; Combo Route) route 4 (four) times daily.    sodium bicarbonate 650 MG tablet Take 4 tablets BID  four hours before or after Harvoni    tacrolimus (PROGRAF) 1 MG Cap Take 3mg in the AM and 2mg in the PM by mouth. Z94.0 Kidney Transplant (Patient taking differently: Take 4mg in the AM and 2mg in the PM by mouth. Z94.0 Kidney Transplant)    albuterol (VENTOLIN HFA) 90 mcg/actuation inhaler Inhale 2 puffs into the lungs every 4 (four) hours as needed for Wheezing.    albuterol-ipratropium 2.5mg-0.5mg/3mL (DUO-NEB) 0.5 mg-3 mg(2.5 mg base)/3 mL nebulizer solution Take 3 mLs by nebulization every 6 (six) hours.    ergocalciferol (ERGOCALCIFEROL) 50,000 unit Cap Take 1 capsule (50,000 Units total) by mouth every 7 days. Take on Mondays    ERTAPENEM SODIUM (ERTAPENEM, INVANZ, 1 G/100 ML NS, READY TO MIX,) Inject 100 mLs (1 g total) into the vein once daily.    methylPREDNISolone (MEDROL DOSEPACK) 4 mg tablet use as directed    multivitamin (THERAGRAN) tablet Take 1 tablet by mouth once daily.    oxycodone (ROXICODONE) 10 mg Tab immediate release tablet     oxycodone (ROXICODONE) 15 MG Tab Take 1 tablet (15 mg total) by mouth every 6 (six) hours as needed for Pain.    tramadol (ULTRAM-ER) 100 MG Tb24 Take 100 mg by mouth as needed.     Family History     Problem Relation (Age of Onset)    Cancer Father    Diabetes Father    Heart failure Father, Mother    Stroke " Father        Social History Main Topics    Smoking status: Former Smoker     Packs/day: 1.00     Years: 40.00     Quit date: 1/11/2013    Smokeless tobacco: Never Used    Alcohol use No    Drug use: No    Sexual activity: No     Review of Systems   Constitutional: Positive for fatigue. Negative for activity change, appetite change, chills, diaphoresis, fever and unexpected weight change.        Malaise   HENT: Negative for congestion, ear discharge, ear pain, facial swelling, hearing loss, postnasal drip, sore throat, tinnitus, trouble swallowing and voice change.    Eyes: Negative for photophobia, pain, discharge, redness, itching and visual disturbance.   Respiratory: Negative for cough, choking, chest tightness, shortness of breath, wheezing and stridor.    Cardiovascular: Negative for chest pain, palpitations and leg swelling.   Gastrointestinal: Positive for nausea and vomiting. Negative for abdominal distention, abdominal pain, blood in stool, constipation and diarrhea.   Endocrine: Negative for cold intolerance, heat intolerance, polydipsia, polyphagia and polyuria.   Genitourinary: Negative for difficulty urinating, flank pain, frequency, hematuria and urgency.   Musculoskeletal: Negative for arthralgias, back pain, gait problem and myalgias.   Skin: Negative for color change, pallor, rash and wound.   Neurological: Positive for weakness. Negative for dizziness, tremors, seizures, syncope, facial asymmetry, speech difficulty, light-headedness, numbness and headaches.   Hematological: Negative for adenopathy. Does not bruise/bleed easily.   Psychiatric/Behavioral: Negative for agitation, confusion, hallucinations and suicidal ideas. The patient is not nervous/anxious.    All other systems reviewed and are negative.    Objective:     Vital Signs (Most Recent):  Temp: 98.1 °F (36.7 °C) (04/07/17 1331)  Pulse: 79 (04/07/17 1331)  Resp: 16 (04/07/17 1331)  BP: (!) 162/87 (04/07/17 1331)  SpO2: 95 %  (04/07/17 1331) Vital Signs (24h Range):  Temp:  [98.1 °F (36.7 °C)-98.7 °F (37.1 °C)] 98.1 °F (36.7 °C)  Pulse:  [71-81] 79  Resp:  [14-18] 16  SpO2:  [95 %-100 %] 95 %  BP: (132-201)/(27-90) 162/87     Weight: 96.6 kg (213 lb)  Body mass index is 30.56 kg/(m^2).    Physical Exam   Constitutional: He is oriented to person, place, and time. He appears well-developed and well-nourished.   HENT:   Head: Normocephalic and atraumatic.   Nose: Nose normal.   Eyes: Conjunctivae and EOM are normal. Pupils are equal, round, and reactive to light.   Neck: Normal range of motion. Neck supple. No JVD present. No tracheal deviation present. No thyromegaly present.   Cardiovascular: Normal rate, regular rhythm, normal heart sounds and intact distal pulses.  Exam reveals no gallop and no friction rub.    No murmur heard.  Pulmonary/Chest: Effort normal. No stridor. No respiratory distress. He has no wheezes. He has no rales. He exhibits no tenderness.   Abdominal: Soft. Bowel sounds are normal. He exhibits no distension. There is no tenderness. There is no rebound and no guarding.   Musculoskeletal: Normal range of motion. He exhibits no edema, tenderness or deformity.   Neurological: He is alert and oriented to person, place, and time. He has normal reflexes. No cranial nerve deficit. Coordination normal.   Skin: Skin is warm and dry. No rash noted. No erythema. No pallor.   Psychiatric: He has a normal mood and affect. His behavior is normal. Judgment and thought content normal.   Nursing note and vitals reviewed.       Significant Labs:   CBC:   Recent Labs  Lab 04/07/17  0950   WBC 3.65*   HGB 13.6*   HCT 40.9        CMP:   Recent Labs  Lab 04/07/17  0950   *   K 3.7   CL 99   CO2 26   *   BUN 30*   CREATININE 1.6*   CALCIUM 9.0   PROT 6.6   ALBUMIN 3.5   BILITOT 0.6   ALKPHOS 77   AST 23   ALT 16   ANIONGAP 10   EGFRNONAA 45*       Significant Imaging: I have reviewed all pertinent imaging  results/findings within the past 24 hours.   Imaging Results         X-Ray Chest AP Portable (Final result) Result time:  04/07/17 10:11:20    Final result by ANA CRISTINA Joseph Sr., MD (04/07/17 10:11:20)    Impression:        1.  The lungs are clear.  2.  There is a mild amount of atherosclerosis.      Electronically signed by: ANA CRISTINA JOSEPH MD  Date:     04/07/17  Time:    10:11     Narrative:    One-view chest x-ray    Clinical History: Weakness    Finding: Comparison was made to a prior examination performed on 3/30/2017.  The size of the heart is normal. The lungs are clear. There is no pneumothorax.  The costophrenic angles are sharp.  There is a mild amount of atherosclerosis.

## 2017-04-07 NOTE — ASSESSMENT & PLAN NOTE
-PPI TID  -No lovenox due to positive occult blood in vomitus  -NP spoke with Dr. Albert - reassess patient in am and call GI if needed  -H/H q 12  hours

## 2017-04-08 VITALS
HEART RATE: 81 BPM | DIASTOLIC BLOOD PRESSURE: 86 MMHG | SYSTOLIC BLOOD PRESSURE: 142 MMHG | TEMPERATURE: 98 F | WEIGHT: 213 LBS | RESPIRATION RATE: 20 BRPM | BODY MASS INDEX: 30.49 KG/M2 | HEIGHT: 70 IN | OXYGEN SATURATION: 95 %

## 2017-04-08 LAB
ALBUMIN SERPL BCP-MCNC: 2.9 G/DL
ALP SERPL-CCNC: 66 U/L
ALT SERPL W/O P-5'-P-CCNC: 15 U/L
ANION GAP SERPL CALC-SCNC: 10 MMOL/L
ANISOCYTOSIS BLD QL SMEAR: SLIGHT
AST SERPL-CCNC: 17 U/L
BASOPHILS # BLD AUTO: ABNORMAL K/UL
BASOPHILS NFR BLD: 0 %
BILIRUB SERPL-MCNC: 0.7 MG/DL
BUN SERPL-MCNC: 34 MG/DL
CALCIUM SERPL-MCNC: 8.5 MG/DL
CHLORIDE SERPL-SCNC: 107 MMOL/L
CO2 SERPL-SCNC: 19 MMOL/L
CREAT SERPL-MCNC: 1.4 MG/DL
DIFFERENTIAL METHOD: ABNORMAL
EOSINOPHIL # BLD AUTO: ABNORMAL K/UL
EOSINOPHIL NFR BLD: 0 %
ERYTHROCYTE [DISTWIDTH] IN BLOOD BY AUTOMATED COUNT: 13.5 %
EST. GFR  (AFRICAN AMERICAN): >60 ML/MIN/1.73 M^2
EST. GFR  (NON AFRICAN AMERICAN): 53 ML/MIN/1.73 M^2
GLUCOSE SERPL-MCNC: 149 MG/DL
HCT VFR BLD AUTO: 39 %
HGB BLD-MCNC: 12.4 G/DL
LYMPHOCYTES # BLD AUTO: ABNORMAL K/UL
LYMPHOCYTES NFR BLD: 15 %
MCH RBC QN AUTO: 31.3 PG
MCHC RBC AUTO-ENTMCNC: 31.8 %
MCV RBC AUTO: 99 FL
MONOCYTES # BLD AUTO: ABNORMAL K/UL
MONOCYTES NFR BLD: 4 %
NEUTROPHILS NFR BLD: 81 %
OVALOCYTES BLD QL SMEAR: ABNORMAL
PLATELET # BLD AUTO: 121 K/UL
PLATELET BLD QL SMEAR: ABNORMAL
PMV BLD AUTO: 11.4 FL
POCT GLUCOSE: 160 MG/DL (ref 70–110)
POCT GLUCOSE: 162 MG/DL (ref 70–110)
POCT GLUCOSE: 178 MG/DL (ref 70–110)
POIKILOCYTOSIS BLD QL SMEAR: SLIGHT
POTASSIUM SERPL-SCNC: 4.6 MMOL/L
PROT SERPL-MCNC: 5.6 G/DL
RBC # BLD AUTO: 3.96 M/UL
SODIUM SERPL-SCNC: 136 MMOL/L
SPHEROCYTES BLD QL SMEAR: ABNORMAL
WBC # BLD AUTO: 3.19 K/UL

## 2017-04-08 PROCEDURE — 63600175 PHARM REV CODE 636 W HCPCS: Performed by: NURSE PRACTITIONER

## 2017-04-08 PROCEDURE — 63600175 PHARM REV CODE 636 W HCPCS: Performed by: INTERNAL MEDICINE

## 2017-04-08 PROCEDURE — 96361 HYDRATE IV INFUSION ADD-ON: CPT

## 2017-04-08 PROCEDURE — 99214 OFFICE O/P EST MOD 30 MIN: CPT | Mod: ,,, | Performed by: INTERNAL MEDICINE

## 2017-04-08 PROCEDURE — C9113 INJ PANTOPRAZOLE SODIUM, VIA: HCPCS | Performed by: NURSE PRACTITIONER

## 2017-04-08 PROCEDURE — 25000003 PHARM REV CODE 250: Performed by: NURSE PRACTITIONER

## 2017-04-08 PROCEDURE — 36415 COLL VENOUS BLD VENIPUNCTURE: CPT

## 2017-04-08 PROCEDURE — 85007 BL SMEAR W/DIFF WBC COUNT: CPT

## 2017-04-08 PROCEDURE — G0378 HOSPITAL OBSERVATION PER HR: HCPCS

## 2017-04-08 PROCEDURE — 80197 ASSAY OF TACROLIMUS: CPT

## 2017-04-08 PROCEDURE — 82962 GLUCOSE BLOOD TEST: CPT

## 2017-04-08 PROCEDURE — 96365 THER/PROPH/DIAG IV INF INIT: CPT

## 2017-04-08 PROCEDURE — 94640 AIRWAY INHALATION TREATMENT: CPT

## 2017-04-08 PROCEDURE — 96367 TX/PROPH/DG ADDL SEQ IV INF: CPT

## 2017-04-08 PROCEDURE — 25000242 PHARM REV CODE 250 ALT 637 W/ HCPCS: Performed by: NURSE PRACTITIONER

## 2017-04-08 PROCEDURE — 85027 COMPLETE CBC AUTOMATED: CPT

## 2017-04-08 PROCEDURE — 96375 TX/PRO/DX INJ NEW DRUG ADDON: CPT

## 2017-04-08 PROCEDURE — 80053 COMPREHEN METABOLIC PANEL: CPT

## 2017-04-08 PROCEDURE — 25000003 PHARM REV CODE 250: Performed by: INTERNAL MEDICINE

## 2017-04-08 RX ORDER — PANTOPRAZOLE SODIUM 40 MG/1
40 TABLET, DELAYED RELEASE ORAL 2 TIMES DAILY
Status: DISCONTINUED | OUTPATIENT
Start: 2017-04-08 | End: 2017-04-08 | Stop reason: HOSPADM

## 2017-04-08 RX ADMIN — IPRATROPIUM BROMIDE AND ALBUTEROL SULFATE 3 ML: .5; 3 SOLUTION RESPIRATORY (INHALATION) at 12:04

## 2017-04-08 RX ADMIN — METOPROLOL TARTRATE 25 MG: 25 TABLET ORAL at 09:04

## 2017-04-08 RX ADMIN — ARFORMOTEROL TARTRATE 15 MCG: 15 SOLUTION RESPIRATORY (INHALATION) at 07:04

## 2017-04-08 RX ADMIN — ASPIRIN 81 MG: 81 TABLET, COATED ORAL at 09:04

## 2017-04-08 RX ADMIN — MAGNESIUM OXIDE TAB 400 MG (241.3 MG ELEMENTAL MG) 400 MG: 400 (241.3 MG) TAB at 09:04

## 2017-04-08 RX ADMIN — PROMETHAZINE HYDROCHLORIDE 6.25 MG: 25 INJECTION, SOLUTION INTRAMUSCULAR; INTRAVENOUS at 04:04

## 2017-04-08 RX ADMIN — BUDESONIDE 0.5 MG: 0.5 SUSPENSION RESPIRATORY (INHALATION) at 07:04

## 2017-04-08 RX ADMIN — ROPINIROLE HYDROCHLORIDE 1 MG: 0.25 TABLET, FILM COATED ORAL at 05:04

## 2017-04-08 RX ADMIN — PREDNISONE 10 MG: 10 TABLET ORAL at 09:04

## 2017-04-08 RX ADMIN — OLANZAPINE 5 MG: 5 TABLET, FILM COATED ORAL at 09:04

## 2017-04-08 RX ADMIN — ATORVASTATIN CALCIUM 10 MG: 10 TABLET, FILM COATED ORAL at 09:04

## 2017-04-08 RX ADMIN — TACROLIMUS 4 MG: 1 CAPSULE ORAL at 08:04

## 2017-04-08 RX ADMIN — PANTOPRAZOLE SODIUM 40 MG: 40 INJECTION, POWDER, FOR SOLUTION INTRAVENOUS at 09:04

## 2017-04-08 RX ADMIN — IPRATROPIUM BROMIDE AND ALBUTEROL SULFATE 3 ML: .5; 3 SOLUTION RESPIRATORY (INHALATION) at 07:04

## 2017-04-08 RX ADMIN — ROPINIROLE HYDROCHLORIDE 1 MG: 0.25 TABLET, FILM COATED ORAL at 02:04

## 2017-04-08 NOTE — CONSULTS
"HPI:  61 year old male with history of HTN, DM2, COPD, CAD, s/p renal transplant, diastolic CHF, GERD, hyperlipidemia, Hep C, proteinuria presents to [facility] with nausea/vomiting. Nephrology was consulted to help with the patient's renal transplant care while he is admitted at [facility]. I saw and examined the patient in his hospital room. Patient reports that he developed nausea/vomiting 1 day PTO. He denies any unusual food intake or going to restaurant. However, he reports that his girlfriend is "sick". He denies any abdominal pain, diarrhea, LE edema, dysuria, chest pain, fever, SOB. Nausea/vomiting has now largely resolved. Patient is renal transplant recipient ( donor transplant on 16) and is followed by renal transplant team in [place]. Patient has also been seen by Dr. Evan Estrada at [facility] in [place]. Baseline creatinine is about 1.5. He is on Cellcept/Prograf/Prednisone immunosuppressive regimen.         Past medical history:  Past Medical History:   Diagnosis Date    DINORAH (acute kidney injury) 2016    Arthritis     Chronic obstructive pulmonary disease 2016    Coronary artery disease involving native coronary artery of native heart without angina pectoris 2016     donor kidney transplant for DM 16     Induction with Thymo x3 and IV solumedrol to total 875mg  Kidney Biopsy  2016: 16 glomeruli, ACR type 1 AVR type 2, significant microcirculatory changes, c4d negative, No DSA, 5 to10% fibrosis. Treated with thymo x8 2016- no rejection      Diastolic heart failure     DM2 (diabetes mellitus, type 2)     Encounter for blood transfusion     ESRD on RRT since 10/2013 10/29/2013    Biopsy proven diabetic nephropathy and lymphoplasmacytic interstitial infiltrate not c/w with AIN (ddx sjogrens or assoc with tamm-horsefall protein extravasation)     GERD (gastroesophageal reflux disease)     History of hepatitis C, s/p " successful Rx w/ SVR12 - 4/2017 4/5/2017    Completed 12 weeks harvoni w/ SVR    Hyperlipidemia     Hypertension     Prophylactic immunotherapy     Proteinuria     Reactive airway disease     Renal hypertension     Type 2 diabetes mellitus with diabetic neuropathy, with long-term current use of insulin 12/1/2016    Type 2 diabetes mellitus with hyperglycemia, with long-term current use of insulin     Type 2 diabetes mellitus with retinopathy, with long-term current use of insulin 12/1/2016    Vitamin B12 deficiency          Surgical history:  Past Surgical History:   Procedure Laterality Date    av bovine graft      Left UE    AV FISTULA PLACEMENT      left UE    CARDIAC CATHETERIZATION  02/2015    KIDNEY TRANSPLANT  05/21/2016    LEG AMPUTATION THROUGH KNEE  2011    right LE, started as nail puncture leading to diabetic ulcer       Social History:  Social History     Social History    Marital status: Single     Spouse name: N/A    Number of children: N/A    Years of education: N/A     Occupational History    Disabled      Disabled     Social History Main Topics    Smoking status: Former Smoker     Packs/day: 1.00     Years: 40.00     Quit date: 1/11/2013    Smokeless tobacco: Never Used    Alcohol use No    Drug use: No    Sexual activity: No     Other Topics Concern    Not on file     Social History Narrative    Lives alone. Retired from construction and various jobs, now retired. Would like to return to work PT to alleviate boredom.       Family history:  Family History   Problem Relation Age of Onset    Cancer Father     Diabetes Father     Heart failure Father     Stroke Father     Heart failure Mother     Kidney disease Neg Hx        Allergies:   Review of patient's allergies indicates:   Allergen Reactions    Tylenol [acetaminophen]      Told not to take per Transplant Team       Medications:  Current Facility-Administered Medications   Medication Dose Route Frequency Provider  Last Rate Last Dose    0.9%  NaCl infusion   Intravenous Continuous Reanna P. Librado,  mL/hr at 04/07/17 1823      albuterol-ipratropium 2.5mg-0.5mg/3mL nebulizer solution 3 mL  3 mL Nebulization Q6H Reanna P. Librado, NP   3 mL at 04/08/17 0701    arformoterol nebulizer solution 15 mcg  15 mcg Nebulization Q12H Reanna P. Librado, NP   15 mcg at 04/08/17 0705    aspirin EC tablet 81 mg  81 mg Oral Daily Reanna P. Librado, NP        atorvastatin tablet 10 mg  10 mg Oral Daily Reanna P. Librado, NP        budesonide nebulizer solution 0.5 mg  0.5 mg Nebulization Q12H Reanna P. Librado, NP   0.5 mg at 04/08/17 0701    dextrose 50% injection 12.5 g  12.5 g Intravenous PRN Reanna P. Librado, NP        dextrose 50% injection 25 g  25 g Intravenous PRN Reanna P. Librado, NP        glucagon (human recombinant) injection 1 mg  1 mg Intramuscular PRN Reanna P. Librado, NP        glucose chewable tablet 16 g  16 g Oral PRN Reanna P. Librado, NP        glucose chewable tablet 24 g  24 g Oral PRN Reanna P. Librado, NP        insulin aspart pen 0-5 Units  0-5 Units Subcutaneous QID (AC + HS) PRN Reanna P. Librado, NP        magnesium oxide tablet 400 mg  400 mg Oral BID Reanna P. Librado, NP   400 mg at 04/07/17 2102    metoclopramide HCl injection 10 mg  10 mg Intravenous Q8H PRN Reanna P. Librado, NP   10 mg at 04/07/17 1742    metoprolol tartrate (LOPRESSOR) tablet 25 mg  25 mg Oral BID Reanna P. Librado, NP   25 mg at 04/07/17 2102    olanzapine tablet 5 mg  5 mg Oral Daily Reanna P. Librado, NP   5 mg at 04/07/17 1721    ondansetron injection 4 mg  4 mg Intravenous Q8H PRN Reanna PShayna Librado, NP   4 mg at 04/07/17 2043    oxycodone immediate release tablet 15 mg  15 mg Oral Q6H PRN Lavelle Kamara, VERO   15 mg at 04/07/17 1447    pantoprazole injection 40 mg  40 mg Intravenous BID Reanna Pavon NP        polyethylene glycol packet 17 g  17 g Oral BID PRN Reanna Pavon, NP  "       predniSONE tablet 10 mg  10 mg Oral Daily Reanna Pavon, NP   10 mg at 04/07/17 1603    promethazine (PHENERGAN) 6.25 mg in dextrose 5 % 50 mL IVPB  6.25 mg Intravenous Q6H PRN Shelotn Edgar  mL/hr at 04/08/17 0451 6.25 mg at 04/08/17 0451    ramelteon tablet 8 mg  8 mg Oral Nightly PRN Reanna P. Librado, NP   8 mg at 04/07/17 2102    ropinirole tablet 1 mg  1 mg Oral TID Reanna P. Librado, NP   1 mg at 04/08/17 0502    senna-docusate 8.6-50 mg per tablet 1 tablet  1 tablet Oral BID PRN Reanna P. Librado, NP        tacrolimus capsule 2 mg  2 mg Oral Daily Reanna P. Librado, NP        tacrolimus capsule 4 mg  4 mg Oral Daily Reanna STRINGER Librado, NP             Review of Systems:  1. GENERAL: patient denies fevers, weight change, generalized weakness/fatigue  2. HEENT: no headaches, visual disturbances, swallowing problems.  3. CARDIOVASCULAR: patient denies chest pain, palpitations.  4. PULMONARY: Patient denies SOB, wheezing, hemoptysis.  5. GI: no abdominal pain, patient reports nausea/vomiting, no diarrhea.  6. GENITOURINARY: no dysuria, hematuria.  7. EXTREMITIES: no LE edema.  8. NEURO: no extremity weakness, no extremity numbness/tingling.  9. SKIN: no rashes  10. MUSKULOSKELETAL: no joint pain or joint swelling.  11. HEMATOLOGY: no rectal or gum bleeding.  12. PSYCH: no anxiety or depression.      Physical Exam:  BP (!) 123/52 (BP Location: Right arm, Patient Position: Lying, BP Method: Automatic)  Pulse 80  Temp 98.7 °F (37.1 °C) (Oral)   Resp 18  Ht 5' 10" (1.778 m)  Wt 96.6 kg (213 lb)  SpO2 (!) 94%  BMI 30.56 kg/m2    HEENT: slightly dry mucosal membranes, PER, no oral exudates, no nasal discharge.  NECK: no lymphadenopathy, no thyroid masses.  HEART: Regular, S1/S2 audible, no rubs, good extremity pulses.  LUNGS: Clear to auscultation, bilaterally, no wheezing, breathing is non-labored.  ABDOMEN: soft, nontender, not distended, bowel sounds are present, no " abdominal hernia.  EXTREMITIES: no LE edema, s/p right AKA.   SKIN: no visible rashes.  NEURO: A & O x3      Labs:  Lab Results   Component Value Date    CREATININE 1.4 04/08/2017    BUN 34 (H) 04/08/2017     04/08/2017    K 4.6 04/08/2017     04/08/2017    CO2 19 (L) 04/08/2017     Lab Results   Component Value Date    .0 (H) 09/19/2016    CALCIUM 8.5 (L) 04/08/2017    CAION 1.10 05/30/2016    PHOS 2.8 04/07/2017     Lab Results   Component Value Date    ALBUMIN 2.9 (L) 04/08/2017       Recent Labs  Lab 04/07/17  2019   MG 1.6     Lab Results   Component Value Date    WBC 3.19 (L) 04/08/2017    HGB 12.4 (L) 04/08/2017    HCT 39.0 (L) 04/08/2017    MCV 99 (H) 04/08/2017     (L) 04/08/2017     Urinalysis    Recent Labs  Lab 04/07/17  1201   COLORU Yellow   SPECGRAV 1.020   PHUR 6.0   PROTEINUA 1+*   BACTERIA Rare   NITRITE Negative   LEUKOCYTESUR Negative   UROBILINOGEN Negative   HYALINECASTS 0   2 RBC, 1 WBC        Imaging:  Imaging Results         X-Ray Chest AP Portable (Final result) Result time:  04/07/17 10:11:20    Final result by ANA CRISTINA Joseph Sr., MD (04/07/17 10:11:20)    Impression:        1.  The lungs are clear.  2.  There is a mild amount of atherosclerosis.      Electronically signed by: ANA CRISTINA JOSEPH MD  Date:     04/07/17  Time:    10:11     Narrative:    One-view chest x-ray    Clinical History: Weakness    Finding: Comparison was made to a prior examination performed on 3/30/2017.  The size of the heart is normal. The lungs are clear. There is no pneumothorax.  The costophrenic angles are sharp.  There is a mild amount of atherosclerosis.                Assessment and Plan:  61 year old male with history of HTN, DM2, COPD, CAD, s/p renal transplant, diastolic CHF, GERD, hyperlipidemia, Hep C, proteinuria presents to [facility] with abdominal pain.      1. Renal transplant: Patient received edonor transplant on 5/21/16. Baseline creatinine about 1.5. Patient on  cellcept/Prograf/Prednisone immunosuppression. Cellcept was discontinued given his GI symptoms. Creatinine has remained at his baseline (1.4 today). Will follow up on Prograf level.     2. Electrolytes: at goal.     3. Acid-base: mild non-gap acidosis, monitor.     4. Volume: no gross overload.     5. HTN: good BP control.     6. Anemia: mild, monitor for now.     7. Medications: Medications reviewed. Agree with medical regimen. Avoid cellcept.     8. Nausea/vomiting: now largely resolved.

## 2017-04-08 NOTE — DISCHARGE SUMMARY
[facility] Hospital Medicine  Discharge Summary      Patient Name: Lavelle Ladd  MRN: [id number]  Admission Date: 4/7/2017  Hospital Length of Stay: 0 days  Discharge Date and Time: 4/8/2017  4:14 PM  Attending Physician: Dr. Shelton Edgar   Discharging Provider: Mimi Mazariegos NP  Primary Care Provider: Rishabh Esteban MD      HPI:   Lavelle Ladd is a 61 y.o. male with PMHx of kidney transplant, HLP, HTN, who presented to the ER for N/V which onset suddenly this AM. Symptoms are episodic and moderate in severity. Associated sxs included fatigue, generalized weakness, and malaise. No other sxs reported. He reported vomiting/dry heaving over 10 x over night. [facility] [place] transplant MD was called by ER. Urinalysis normal. Creatinine 1.6. Patient will be admitted to Observation for IV fluids and anti-emetics. He will be reassessed in the morning.    * No surgery found *      Indwelling Lines/Drains at time of discharge:   Lines/Drains/Airways     Drain                 Hemodialysis AV Fistula Left upper arm -- days              Hospital Course:   Pt admitted to Observation Unit for Nausea/Vomiting with history of kidney transplant.  Nephrology consulted and Cellcept stopped.  Pt treated with IVF and antiemetics prn.  GI consulted and PPI initiated.  Tacrolimus level obtained.  H/H stable.  Creatinine normalized.  Nausea and vomiting resolved and pt verbalized symptom relief.  Pt seen and examined on the date of discharge and deemed suitable for discharge to home.  Pepcid and current medications resumed per Nephrology recommendation.  Pt instructed to follow up with Dr. Rose (Nephrology) and GI.  Pt to keep previously scheduled appointments with Transplant team at [facility].      Consults:       Significant Diagnostic Studies:   Imaging Results         X-Ray Chest AP Portable (Final result) Result time:  04/07/17 10:11:20    Final result by ANA CRISTINA Joseph Sr., MD (04/07/17 10:11:20)    Impression:         1.  The lungs are clear.  2.  There is a mild amount of atherosclerosis.      Electronically signed by: ANA CRISTINA AGUIRRE MD  Date:     04/07/17  Time:    10:11     Narrative:    One-view chest x-ray    Clinical History: Weakness    Finding: Comparison was made to a prior examination performed on 3/30/2017.  The size of the heart is normal. The lungs are clear. There is no pneumothorax.  The costophrenic angles are sharp.  There is a mild amount of atherosclerosis.              Pending Diagnostic Studies:     None        Final Active Diagnoses:    Diagnosis Date Noted POA    PRINCIPAL PROBLEM:  Kidney transplant recipient [Z94.0] 04/07/2017 Not Applicable    Intractable vomiting with nausea [R11.2] 04/07/2017 Yes    Peptic ulcer disease [K27.9] 10/12/2013 Yes      Problems Resolved During this Admission:    Diagnosis Date Noted Date Resolved POA        Discharged Condition: stable    Disposition: Home or Self Care    Follow Up:  Follow-up Information     Follow up with Rishabh Esteban MD In 3 days.    Specialty:  Family Medicine    Why:  hospital follow up     Contact information:    [address] [facility] [address]  [place] [address]  [phone]          Follow up with Charlette Estrada MD In 1 week.    Specialty:  Neurology    Why:  hospital follow up     Contact information:    [address] [facility] [address] [phone]          Follow up with Valeri Albert MD In 1 week.    Specialty:  Gastroenterology    Why:  hospital follow up for N/V    Contact information:    [address] [facility] [address] [phone]          Patient Instructions:     Diet general   Order Specific Question Answer Comments   Na restriction, if any: 2gNa    Additional restrictions: Renal    Additional restrictions: Diabetic 1800      Activity as tolerated     Call MD for:  temperature >100.4     Call MD for:  persistent nausea and vomiting or diarrhea     Call MD for:  severe uncontrolled pain     Call MD for:  increased  "confusion or weakness     Call MD for:  persistent dizziness, light-headedness, or visual disturbances     Call MD for:  worsening rash     Call MD for:  severe persistent headache     Call MD for:  difficulty breathing or increased cough       Medications:  Reconciled Home Medications:   Discharge Medication List as of 4/8/2017  3:44 PM      CONTINUE these medications which have NOT CHANGED    Details   aspirin (ECOTRIN) 81 MG EC tablet Take 1 tablet (81 mg total) by mouth once daily., Starting 7/1/2016, Until Sat 7/1/17, No Print      atovaquone (MEPRON) 750 mg/5 mL Susp Take 10 mLs (1,500 mg total) by mouth once daily., Starting 5/12/2016, Until Fri 5/12/17, Normal      BD INSULIN PEN NEEDLE UF SHORT 31 gauge x 5/16" Ndle Starting 12/13/2016, Until Discontinued, Historical Med      BD INSULIN SYRINGE ULTRA-FINE 1 mL 31 gauge x 5/16 Syrg Starting 3/15/2017, Until Discontinued, Historical Med      blood sugar diagnostic Strp 1 each by Misc.(Non-Drug; Combo Route) route 3 (three) times daily., Starting 6/9/2016, Until Discontinued, Normal      blood-glucose meter kit Use as instructed, Normal      budesonide-formoterol 160-4.5 mcg (SYMBICORT) 160-4.5 mcg/actuation HFAA Inhale 2 puffs into the lungs every 12 (twelve) hours. Wash out mouth after using, Starting 3/1/2017, Until Thu 3/1/18, Normal      famotidine (PEPCID) 20 MG tablet Take 1 tablet (20 mg total) by mouth every evening., Starting 5/23/2016, Until Tue 5/23/17, Normal      inhalation device (BREATHERITE VALVED MDI CHAMBER) Use as directed for inhalation., Print      insulin NPH (NOVOLIN N) 100 unit/mL injection Take 25 units subq with breakfast and 15 units at bedtime, Normal      insulin regular (NOVOLIN R) 100 unit/mL Inj injection Inject 6 units with breakfast and 8 units with dinner, subcutaneously 30 min before meal., Normal      insulin syringe,safetyneedle 1 mL 31 gauge x 5/16" Syrg 1 Syringe by Misc.(Non-Drug; Combo Route) route 4 (four) times " "daily with meals and nightly., Starting 3/15/2017, Until Thu 3/15/18, Normal      lancets Misc 1 each by Misc.(Non-Drug; Combo Route) route 3 (three) times daily., Starting 6/9/2016, Until Discontinued, Normal      ledipasvir-sofosbuvir  mg Tab Take by mouth once daily., Until Discontinued, Historical Med      magnesium oxide (MAG-OX) 400 mg tablet Take 1 tablet (400 mg total) by mouth 2 (two) times daily., Starting 7/19/2016, Until Wed 7/19/17, Normal      metoprolol tartrate (LOPRESSOR) 50 MG tablet Take 0.5 tablets (25 mg total) by mouth 2 (two) times daily., Starting 9/28/2016, Until Thu 9/28/17, No Print      mycophenolate (CELLCEPT) 250 mg Cap Take 2 capsules (500 mg total) by mouth 2 (two) times daily. Z94.0 Kidney Transplant 5/21/2016., Starting 1/11/2017, Until Thu 1/11/18, Normal      olanzapine (ZYPREXA) 5 MG tablet Take 1 tablet (5 mg total) by mouth every evening., Starting 6/9/2016, Until Fri 6/9/17, Normal      ondansetron (ZOFRAN-ODT) 4 MG TbDL Take 2 tablets (8 mg total) by mouth every 8 (eight) hours as needed (nausea)., Starting 8/22/2016, Until Discontinued, Normal      pen needle, diabetic (EASY COMFORT PEN NEEDLES) 32 gauge x 5/32" Ndle Inject 1 each into the skin 3 (three) times daily., Starting 6/9/2016, Until Discontinued, Normal      pen needle, diabetic 31 gauge x 1/4" Ndle 1 each by Misc.(Non-Drug; Combo Route) route 4 (four) times daily., Starting 12/13/2016, Until Discontinued, Normal      predniSONE (DELTASONE) 10 MG tablet Take 10 mg by mouth once daily., Until Discontinued, Historical Med      sodium bicarbonate 650 MG tablet Take 4 tablets BID  four hours before or after Harvoni, Normal      tacrolimus (PROGRAF) 1 MG Cap Take 3mg in the AM and 2mg in the PM by mouth. Z94.0 Kidney Transplant, Normal      albuterol (VENTOLIN HFA) 90 mcg/actuation inhaler Inhale 2 puffs into the lungs every 4 (four) hours as needed for Wheezing., Starting 3/30/2017, Until Fri 3/30/18, " Normal      albuterol-ipratropium 2.5mg-0.5mg/3mL (DUO-NEB) 0.5 mg-3 mg(2.5 mg base)/3 mL nebulizer solution Take 3 mLs by nebulization every 6 (six) hours., Starting 3/1/2017, Until Thu 3/1/18, Normal      ergocalciferol (ERGOCALCIFEROL) 50,000 unit Cap Take 1 capsule (50,000 Units total) by mouth every 7 days. Take on Mondays, Starting 5/23/2016, Until Discontinued, Normal      ERTAPENEM SODIUM (ERTAPENEM, INVANZ, 1 G/100 ML NS, READY TO MIX,) Inject 100 mLs (1 g total) into the vein once daily., Starting 12/13/2016, Until Discontinued, No Print      multivitamin (THERAGRAN) tablet Take 1 tablet by mouth once daily., Starting 5/23/2016, Until Discontinued, Normal      !! oxycodone (ROXICODONE) 10 mg Tab immediate release tablet Starting 3/15/2017, Until Discontinued, Historical Med      !! oxycodone (ROXICODONE) 15 MG Tab Take 1 tablet (15 mg total) by mouth every 6 (six) hours as needed for Pain., Starting 12/13/2016, Until Discontinued, Print      tramadol (ULTRAM-ER) 100 MG Tb24 Take 100 mg by mouth as needed., Until Discontinued, Historical Med       !! - Potential duplicate medications found. Please discuss with provider.      STOP taking these medications       methylPREDNISolone (MEDROL DOSEPACK) 4 mg tablet Comments:   Reason for Stopping:             Time spent on the discharge of patient: 40 minutes    Mimi Mazariegos NP  Department of Hospital Medicine  [facility]

## 2017-04-08 NOTE — PLAN OF CARE
Problem: Patient Care Overview  Goal: Plan of Care Review  Outcome: Ongoing (interventions implemented as appropriate)  Patient 's spo2 and breathing are stable.

## 2017-04-08 NOTE — PLAN OF CARE
Problem: Patient Care Overview  Goal: Plan of Care Review  Outcome: Ongoing (interventions implemented as appropriate)  Pt alert and oriented. C/o continous nausea with emesis episode X1. No relief from PRN Zofran, moderate relief with Phenergan. NS @ 100 ml/hr maintained. R BKA with prosthesis present. Up in chair for comfort, repositioned self, ambulated to RR, remained free of falls during shift, call light in reach, room free of clutter, side rails up X2, pt on telemetry monitor SR, will continue to monitor.

## 2017-04-09 LAB — TACROLIMUS BLD-MCNC: 3.1 NG/ML

## 2017-04-10 ENCOUNTER — TELEPHONE (OUTPATIENT)
Dept: NEPHROLOGY | Facility: CLINIC | Age: 62
End: 2017-04-10

## 2017-04-10 NOTE — TELEPHONE ENCOUNTER
Returned call and spoke with Dr. Jackson's nurse. She states that patient is coming in tomorrow for some simple fillings. She would like to know if patient needs to be premedicated with antibiotics before the procedure? Documents needs to be faxed over to them. Please advise

## 2017-04-10 NOTE — TELEPHONE ENCOUNTER
----- Message from Vannesa Valdez sent at 4/10/2017 11:12 AM CDT -----  Contact: Dr Renetta Jackson  [phone] ,, calling to see if patient can be medicated for a dental visit,, please fax consent to [phone],,,  please call back

## 2017-04-10 NOTE — TELEPHONE ENCOUNTER
Called and informed Mirlande that patient does not need to be premedicated for dental work per Dr. Estrada. She expressed understanding.

## 2017-04-11 ENCOUNTER — TELEPHONE (OUTPATIENT)
Dept: PHARMACY | Facility: CLINIC | Age: 62
End: 2017-04-11

## 2017-04-14 ENCOUNTER — TELEPHONE (OUTPATIENT)
Dept: TRANSPLANT | Facility: CLINIC | Age: 62
End: 2017-04-14

## 2017-04-14 NOTE — TELEPHONE ENCOUNTER
Call placed, patient reports he has in Hospital prior to last lab draw with emesis. Agreeable to increase PO hydration and repeat labs next week.

## 2017-04-23 ENCOUNTER — HOSPITAL ENCOUNTER (EMERGENCY)
Facility: HOSPITAL | Age: 62
Discharge: HOME | End: 2017-04-23
Attending: EMERGENCY MEDICINE
Payer: MEDICARE

## 2017-04-23 VITALS
RESPIRATION RATE: 12 BRPM | OXYGEN SATURATION: 96 % | TEMPERATURE: 98 F | WEIGHT: 200 LBS | HEART RATE: 76 BPM | SYSTOLIC BLOOD PRESSURE: 173 MMHG | HEIGHT: 70 IN | BODY MASS INDEX: 28.63 KG/M2 | DIASTOLIC BLOOD PRESSURE: 94 MMHG

## 2017-04-23 DIAGNOSIS — R06.02 SOB (SHORTNESS OF BREATH): ICD-10-CM

## 2017-04-23 LAB
ALBUMIN SERPL BCP-MCNC: 3.3 G/DL
ALP SERPL-CCNC: 110 U/L
ALT SERPL W/O P-5'-P-CCNC: 10 U/L
ANION GAP SERPL CALC-SCNC: 11 MMOL/L
AST SERPL-CCNC: 21 U/L
BASOPHILS # BLD AUTO: 0.02 K/UL
BASOPHILS NFR BLD: 0.6 %
BILIRUB SERPL-MCNC: 0.3 MG/DL
BNP SERPL-MCNC: 100 PG/ML
BUN SERPL-MCNC: 22 MG/DL
CALCIUM SERPL-MCNC: 9 MG/DL
CHLORIDE SERPL-SCNC: 104 MMOL/L
CO2 SERPL-SCNC: 23 MMOL/L
CREAT SERPL-MCNC: 1.9 MG/DL
DIFFERENTIAL METHOD: ABNORMAL
EOSINOPHIL # BLD AUTO: 0.1 K/UL
EOSINOPHIL NFR BLD: 2.3 %
ERYTHROCYTE [DISTWIDTH] IN BLOOD BY AUTOMATED COUNT: 13.1 %
EST. GFR  (AFRICAN AMERICAN): 42 ML/MIN/1.73 M^2
EST. GFR  (NON AFRICAN AMERICAN): 37 ML/MIN/1.73 M^2
GLUCOSE SERPL-MCNC: 251 MG/DL
HCT VFR BLD AUTO: 38.4 %
HGB BLD-MCNC: 12.6 G/DL
INR PPP: 1
LYMPHOCYTES # BLD AUTO: 1.1 K/UL
LYMPHOCYTES NFR BLD: 35.3 %
MCH RBC QN AUTO: 31 PG
MCHC RBC AUTO-ENTMCNC: 32.8 %
MCV RBC AUTO: 95 FL
MONOCYTES # BLD AUTO: 0.4 K/UL
MONOCYTES NFR BLD: 13.9 %
NEUTROPHILS # BLD AUTO: 1.5 K/UL
NEUTROPHILS NFR BLD: 47.9 %
PLATELET # BLD AUTO: 218 K/UL
PMV BLD AUTO: 9.8 FL
POTASSIUM SERPL-SCNC: 4.5 MMOL/L
PROT SERPL-MCNC: 6.8 G/DL
PROTHROMBIN TIME: 10.7 SEC
RBC # BLD AUTO: 4.06 M/UL
SODIUM SERPL-SCNC: 138 MMOL/L
TROPONIN I SERPL DL<=0.01 NG/ML-MCNC: <0.006 NG/ML
WBC # BLD AUTO: 3.09 K/UL

## 2017-04-23 PROCEDURE — 94640 AIRWAY INHALATION TREATMENT: CPT

## 2017-04-23 PROCEDURE — 84484 ASSAY OF TROPONIN QUANT: CPT

## 2017-04-23 PROCEDURE — 85025 COMPLETE CBC W/AUTO DIFF WBC: CPT

## 2017-04-23 PROCEDURE — 80053 COMPREHEN METABOLIC PANEL: CPT

## 2017-04-23 PROCEDURE — 99284 EMERGENCY DEPT VISIT MOD MDM: CPT | Mod: 25

## 2017-04-23 PROCEDURE — 93005 ELECTROCARDIOGRAM TRACING: CPT

## 2017-04-23 PROCEDURE — 25000242 PHARM REV CODE 250 ALT 637 W/ HCPCS: Performed by: EMERGENCY MEDICINE

## 2017-04-23 PROCEDURE — 83880 ASSAY OF NATRIURETIC PEPTIDE: CPT

## 2017-04-23 PROCEDURE — 85610 PROTHROMBIN TIME: CPT

## 2017-04-23 RX ORDER — IPRATROPIUM BROMIDE AND ALBUTEROL SULFATE 2.5; .5 MG/3ML; MG/3ML
3 SOLUTION RESPIRATORY (INHALATION) ONCE
Status: COMPLETED | OUTPATIENT
Start: 2017-04-23 | End: 2017-04-23

## 2017-04-23 RX ADMIN — IPRATROPIUM BROMIDE AND ALBUTEROL SULFATE 3 ML: .5; 3 SOLUTION RESPIRATORY (INHALATION) at 10:04

## 2017-04-23 NOTE — ED PROVIDER NOTES
SCRIBE #1 NOTE: I, Ryan Fitzpatrick, am scribing for, and in the presence of, Maria Eugenia Rand MD. I have scribed the entire note.      History      Chief Complaint   Patient presents with    Shortness of Breath     intermittent SOB for months, hx of copd, wants a tx        Review of patient's allergies indicates:   Allergen Reactions    Tylenol [acetaminophen]      Told not to take per Transplant Team        HPI   HPI    2017, 10:38 AM   History obtained from the patient      History of Present Illness: Lavelle Ladd is a 61 y.o. male patient who presents to the Emergency Department for shortness of breath which onset gradually 2 days ago. Symptoms are intermittent and moderate in severity. Pt had a kidney transplant performed 7-8 months ago. No mitigating or exacerbating factors reported. Associated sxs include CAMARA, nausea, and cough. Patient denies any vomiting, CP, fever, chills, HA, lightheadedness, dizziness, numbness, weakness, recent long travel, and all other sxs at this time. No further complaints or concerns at this time.         Arrival mode: Personal vehicle     PCP: Rishabh Esteban MD       Past Medical History:  Past Medical History:   Diagnosis Date    DINORAH (acute kidney injury) 2016    Arthritis     Chronic obstructive pulmonary disease 2016    Coronary artery disease involving native coronary artery of native heart without angina pectoris 2016     donor kidney transplant for DM 16     Induction with Thymo x3 and IV solumedrol to total 875mg  Kidney Biopsy  2016: 16 glomeruli, ACR type 1 AVR type 2, significant microcirculatory changes, c4d negative, No DSA, 5 to10% fibrosis. Treated with thymo x8 2016- no rejection      Diastolic heart failure     DM2 (diabetes mellitus, type 2)     Encounter for blood transfusion     ESRD on RRT since 10/2013 10/29/2013    Biopsy proven diabetic nephropathy and lymphoplasmacytic interstitial infiltrate not c/w with  AIN (ddx sjogrens or assoc with tamm-horsefall protein extravasation)     GERD (gastroesophageal reflux disease)     History of hepatitis C, s/p successful Rx w/ SVR12 - 4/2017 4/5/2017    Completed 12 weeks harvoni w/ SVR    Hyperlipidemia     Hypertension     Prophylactic immunotherapy     Proteinuria     Reactive airway disease     Renal hypertension     Type 2 diabetes mellitus with diabetic neuropathy, with long-term current use of insulin 12/1/2016    Type 2 diabetes mellitus with hyperglycemia, with long-term current use of insulin     Type 2 diabetes mellitus with retinopathy, with long-term current use of insulin 12/1/2016    Vitamin B12 deficiency        Past Surgical History:  Past Surgical History:   Procedure Laterality Date    av bovine graft      Left UE    AV FISTULA PLACEMENT      left UE    CARDIAC CATHETERIZATION  02/2015    KIDNEY TRANSPLANT  05/21/2016    LEG AMPUTATION THROUGH KNEE  2011    right LE, started as nail puncture leading to diabetic ulcer         Family History:  Family History   Problem Relation Age of Onset    Cancer Father     Diabetes Father     Heart failure Father     Stroke Father     Heart failure Mother     Kidney disease Neg Hx        Social History:  Social History     Social History Main Topics    Smoking status: Former Smoker     Packs/day: 1.00     Years: 40.00     Quit date: 1/11/2013    Smokeless tobacco: Never Used    Alcohol use No    Drug use: No    Sexual activity: No       ROS   Review of Systems   Constitutional: Negative for chills and fever.        - recent long travel   HENT: Negative for sore throat.    Respiratory: Positive for cough and shortness of breath.         + CAMARA   Cardiovascular: Negative for chest pain.   Gastrointestinal: Positive for nausea. Negative for abdominal pain, diarrhea and vomiting.   Genitourinary: Negative for dysuria.   Musculoskeletal: Negative for back pain.   Skin: Negative for rash.   Neurological:  "Negative for dizziness, weakness, light-headedness, numbness and headaches.   Hematological: Does not bruise/bleed easily.       Physical Exam    Initial Vitals   BP Pulse Resp Temp SpO2   04/23/17 1014 04/23/17 1014 04/23/17 1014 04/23/17 1014 04/23/17 1014   187/86 80 16 97.5 °F (36.4 °C) 95 %      Physical Exam  Nursing Notes and Vital Signs Reviewed.  Constitutional: Patient is in no acute distress. Awake and alert. Well-developed and well-nourished.  Head: Atraumatic. Normocephalic.  Eyes: PERRL. EOM intact. Conjunctivae are not pale. No scleral icterus.  ENT: Mucous membranes are moist. Oropharynx is clear and symmetric.    Neck: Supple. Full ROM. No lymphadenopathy. No JVD.  Cardiovascular: Regular rate. Regular rhythm. No murmurs, rubs, or gallops. Distal pulses are 2+ and symmetric.  Pulmonary/Chest: No respiratory distress. Clear to auscultation bilaterally. No wheezing, rales, or rhonchi.  Abdominal: Soft and non-distended.  There is no tenderness.  No rebound, guarding, or rigidity. Good bowel sounds.  Genitourinary: No CVA tenderness  Musculoskeletal: Moves all extremities. No obvious deformities. No edema. No calf tenderness.  Skin: Warm and dry.  Neurological:  Alert, awake, and appropriate.  Normal speech.  No acute focal neurological deficits are appreciated.  Psychiatric: Normal affect. Good eye contact. Appropriate in content.    ED Course    Procedures  ED Vital Signs:  Vitals:    04/23/17 1014 04/23/17 1030 04/23/17 1148 04/23/17 1149   BP: (!) 187/86  (!) 175/73    Pulse: 80 76 75 74   Resp: 16 16     Temp: 97.5 °F (36.4 °C)      TempSrc: Oral      SpO2: 95% 98% (!) 94% 97%   Weight: 90.7 kg (200 lb)      Height: 5' 10" (1.778 m)       04/23/17 1232 04/23/17 1247 04/23/17 1249 04/23/17 1317   BP: (!) 168/89 (!) 179/94  (!) 173/94   Pulse: 79 74  76   Resp:    12   Temp:   97.7 °F (36.5 °C)    TempSrc:   Oral    SpO2: 95% 95%  96%   Weight:       Height:           Abnormal Lab Results:  Labs " Reviewed   CBC W/ AUTO DIFFERENTIAL - Abnormal; Notable for the following:        Result Value    WBC 3.09 (*)     RBC 4.06 (*)     Hemoglobin 12.6 (*)     Hematocrit 38.4 (*)     Gran # 1.5 (*)     All other components within normal limits   COMPREHENSIVE METABOLIC PANEL - Abnormal; Notable for the following:     Glucose 251 (*)     Creatinine 1.9 (*)     Albumin 3.3 (*)     eGFR if  42 (*)     eGFR if non  37 (*)     All other components within normal limits   B-TYPE NATRIURETIC PEPTIDE - Abnormal; Notable for the following:      (*)     All other components within normal limits   TROPONIN I   PROTIME-INR        All Lab Results:  Results for orders placed or performed during the hospital encounter of 04/23/17   CBC auto differential   Result Value Ref Range    WBC 3.09 (L) 3.90 - 12.70 K/uL    RBC 4.06 (L) 4.60 - 6.20 M/uL    Hemoglobin 12.6 (L) 14.0 - 18.0 g/dL    Hematocrit 38.4 (L) 40.0 - 54.0 %    MCV 95 82 - 98 fL    MCH 31.0 27.0 - 31.0 pg    MCHC 32.8 32.0 - 36.0 %    RDW 13.1 11.5 - 14.5 %    Platelets 218 150 - 350 K/uL    MPV 9.8 9.2 - 12.9 fL    Gran # 1.5 (L) 1.8 - 7.7 K/uL    Lymph # 1.1 1.0 - 4.8 K/uL    Mono # 0.4 0.3 - 1.0 K/uL    Eos # 0.1 0.0 - 0.5 K/uL    Baso # 0.02 0.00 - 0.20 K/uL    Gran% 47.9 38.0 - 73.0 %    Lymph% 35.3 18.0 - 48.0 %    Mono% 13.9 4.0 - 15.0 %    Eosinophil% 2.3 0.0 - 8.0 %    Basophil% 0.6 0.0 - 1.9 %    Differential Method Automated    Comprehensive metabolic panel   Result Value Ref Range    Sodium 138 136 - 145 mmol/L    Potassium 4.5 3.5 - 5.1 mmol/L    Chloride 104 95 - 110 mmol/L    CO2 23 23 - 29 mmol/L    Glucose 251 (H) 70 - 110 mg/dL    BUN, Bld 22 8 - 23 mg/dL    Creatinine 1.9 (H) 0.5 - 1.4 mg/dL    Calcium 9.0 8.7 - 10.5 mg/dL    Total Protein 6.8 6.0 - 8.4 g/dL    Albumin 3.3 (L) 3.5 - 5.2 g/dL    Total Bilirubin 0.3 0.1 - 1.0 mg/dL    Alkaline Phosphatase 110 55 - 135 U/L    AST 21 10 - 40 U/L    ALT 10 10 - 44 U/L     Anion Gap 11 8 - 16 mmol/L    eGFR if African American 42 (A) >60 mL/min/1.73 m^2    eGFR if non African American 37 (A) >60 mL/min/1.73 m^2   Brain natriuretic peptide   Result Value Ref Range     (H) 0 - 99 pg/mL   Troponin I   Result Value Ref Range    Troponin I <0.006 0.000 - 0.026 ng/mL   Protime-INR   Result Value Ref Range    Prothrombin Time 10.7 9.0 - 12.5 sec    INR 1.0 0.8 - 1.2         Imaging Results:  Imaging Results         X-Ray Chest PA And Lateral (Final result) Result time:  04/23/17 10:55:17    Final result by Nicolas aSwyer MD (04/23/17 10:55:17)    Impression:           1.  Mild left basilar atelectasis.  2.  Stable findings as noted above.  Negative for acute process otherwise.      Electronically signed by: NICOLAS SAWYER MD  Date:     04/23/17  Time:    10:55     Narrative:    PA and Lateral Chest x-ray    Clinical Indication: Shortness of breath.      Findings:    Comparison is made to multiple prior studies dating back to 03/16/2015.  Most recent comparison study is 04/07/2017.   Mild left basilar atelectasis.  The lungs are otherwise clear. The cardiac silhouette size is normal. The trachea is midline and the mediastinal width is normal. Negative for focal infiltrate, effusion or pneumothorax. Pulmonary vasculature is normal. Negative for osseous abnormalities. There are calcifications of the aortic knob and tortuosity of the descending thoracic aorta. There are degenerative changes of spine and both shoulder girdles.                      The Emergency Provider reviewed the vital signs and test results, which are outlined above.    ED Discussion     1:23 PM: Reassessed pt at this time.  Pt states his condition has improved at this time. Discussed with pt all pertinent ED information and results. Discussed pt dx and plan of tx. Gave pt all f/u and return to the ED instructions. All questions and concerns were addressed at this time. Pt expresses understanding of information and  instructions, and is comfortable with plan to discharge. Pt is stable for discharge.    I discussed with patient and/or family/caretaker that evaluation in the ED does not suggest any emergent or life threatening medical conditions requiring immediate intervention beyond what was provided in the ED, and I believe patient is safe for discharge.  Regardless, an unremarkable evaluation in the ED does not preclude the development or presence of a serious of life threatening condition. As such, patient was instructed to return immediately for any worsening or change in current symptoms.        ED Medication(s):  Medications   albuterol-ipratropium 2.5mg-0.5mg/3mL nebulizer solution 3 mL (3 mLs Nebulization Given 4/23/17 1030)       Discharge Medication List as of 4/23/2017  1:22 PM          Follow-up Information     Follow up with Rishabh Esteban MD In 2 days.    Specialty:  Family Medicine    Contact information:    [address]  [address]  [facility] [address]  [phone]          Follow up with [facility]    Specialty:  Emergency Medicine    Why:  As needed, If symptoms worsen    Contact information:    [address]  [facility] [id number] [phone]            Medical Decision Making    Medical Decision Making:   Clinical Tests:   Lab Tests: Reviewed and Ordered  Radiological Study: Ordered and Reviewed  Medical Tests: Reviewed and Ordered           Scribe Attestation:   Scribe #1: I performed the above scribed service and the documentation accurately describes the services I performed. I attest to the accuracy of the note.    Attending:   Physician Attestation Statement for Scribe #1: I, Maria Eugenia Rand MD, personally performed the services described in this documentation, as scribed by Ryan Fitzpatrick, in my presence, and it is both accurate and complete.          Clinical Impression       ICD-10-CM ICD-9-CM   1. SOB (shortness of breath) R06.02 786.05   2. SOB (shortness  of breath) R06.02 786.05       Disposition:   Disposition: Discharged  Condition: Stable         Maria Eugenia Rand MD  04/23/17 1427

## 2017-04-23 NOTE — DISCHARGE INSTRUCTIONS

## 2017-04-23 NOTE — ED AVS SNAPSHOT
[facility] [address] [place] [address]               Lavelle Ladd   2017 10:16 AM   ED    Description:  Male : 1955   Department:  [facility]           Your Care was Coordinated By:     Provider Role From To    Maria Eugenia Rand MD Attending Provider 17 1026 --      Reason for Visit     Shortness of Breath           Diagnoses this Visit        Comments    SOB (shortness of breath)         SOB (shortness of breath)           ED Disposition     ED Disposition Condition Comment    Discharge             To Do List           Follow-up Information     Follow up with Rishabh Esteban MD In 2 days.    Specialty:  Family Medicine    Contact information:     [facility]  [address]  [facility] [address]  [phone]          Follow up with [facility].    Specialty:  Emergency Medicine    Why:  As needed, If symptoms worsen    Contact information:    [address] [place] [id number] [phone]      [facility]     [facility] Care Line -  Assistance  Unless otherwise directed by your provider, please contact [facility], our nurse care line that is available for  assistance.     Registered nurses in the [facility] provide: appointment scheduling, clinical advisement, health education, and other advisory services.  Call: [phone] (toll free)               Medications           Message regarding Medications     Verify the changes and/or additions to your medication regime listed below are the same as discussed with your clinician today.  If any of these changes or additions are incorrect, please notify your healthcare provider.        These medications were administered today        Dose Freq    albuterol-ipratropium 2.5mg-0.5mg/3mL nebulizer solution 3 mL 3 mL Once    Sig: Take 3 mLs by nebulization once.    Class: Normal    Route: Nebulization     "       Verify that the below list of medications is an accurate representation of the medications you are currently taking.  If none reported, the list may be blank. If incorrect, please contact your healthcare provider. Carry this list with you in case of emergency.           Current Medications     albuterol (VENTOLIN HFA) 90 mcg/actuation inhaler Inhale 2 puffs into the lungs every 4 (four) hours as needed for Wheezing.    albuterol-ipratropium 2.5mg-0.5mg/3mL (DUO-NEB) 0.5 mg-3 mg(2.5 mg base)/3 mL nebulizer solution Take 3 mLs by nebulization every 6 (six) hours.    aspirin (ECOTRIN) 81 MG EC tablet Take 1 tablet (81 mg total) by mouth once daily.    atovaquone (MEPRON) 750 mg/5 mL Susp Take 10 mLs (1,500 mg total) by mouth once daily.    BD INSULIN PEN NEEDLE UF SHORT 31 gauge x 5/16" Ndle     BD INSULIN SYRINGE ULTRA-FINE 1 mL 31 gauge x 5/16 Syrg     blood sugar diagnostic Strp 1 each by Misc.(Non-Drug; Combo Route) route 3 (three) times daily.    blood-glucose meter kit Use as instructed    budesonide-formoterol 160-4.5 mcg (SYMBICORT) 160-4.5 mcg/actuation HFAA Inhale 2 puffs into the lungs every 12 (twelve) hours. Wash out mouth after using    ergocalciferol (ERGOCALCIFEROL) 50,000 unit Cap Take 1 capsule (50,000 Units total) by mouth every 7 days. Take on Mondays    ERTAPENEM SODIUM (ERTAPENEM, INVANZ, 1 G/100 ML NS, READY TO MIX,) Inject 100 mLs (1 g total) into the vein once daily.    famotidine (PEPCID) 20 MG tablet Take 1 tablet (20 mg total) by mouth every evening.    inhalation device (BREATHERITE VALVED MDI CHAMBER) Use as directed for inhalation.    insulin NPH (NOVOLIN N) 100 unit/mL injection Take 25 units subq with breakfast and 15 units at bedtime    insulin regular (NOVOLIN R) 100 unit/mL Inj injection Inject 6 units with breakfast and 8 units with dinner, subcutaneously 30 min before meal.    insulin syringe,safetyneedle 1 mL 31 gauge x 5/16" Syrg 1 Syringe by Misc.(Non-Drug; Combo Route) " "route 4 (four) times daily with meals and nightly.    lancets Misc 1 each by Misc.(Non-Drug; Combo Route) route 3 (three) times daily.    ledipasvir-sofosbuvir  mg Tab Take by mouth once daily.    magnesium oxide (MAG-OX) 400 mg tablet Take 1 tablet (400 mg total) by mouth 2 (two) times daily.    metoprolol tartrate (LOPRESSOR) 50 MG tablet Take 0.5 tablets (25 mg total) by mouth 2 (two) times daily.    multivitamin (THERAGRAN) tablet Take 1 tablet by mouth once daily.    mycophenolate (CELLCEPT) 250 mg Cap Take 2 capsules (500 mg total) by mouth 2 (two) times daily. Z94.0 Kidney Transplant 5/21/2016.    olanzapine (ZYPREXA) 5 MG tablet Take 1 tablet (5 mg total) by mouth every evening.    ondansetron (ZOFRAN-ODT) 4 MG TbDL Take 2 tablets (8 mg total) by mouth every 8 (eight) hours as needed (nausea).    oxycodone (ROXICODONE) 10 mg Tab immediate release tablet     oxycodone (ROXICODONE) 15 MG Tab Take 1 tablet (15 mg total) by mouth every 6 (six) hours as needed for Pain.    pen needle, diabetic (EASY COMFORT PEN NEEDLES) 32 gauge x 5/32" Ndle Inject 1 each into the skin 3 (three) times daily.    pen needle, diabetic 31 gauge x 1/4" Ndle 1 each by Misc.(Non-Drug; Combo Route) route 4 (four) times daily.    predniSONE (DELTASONE) 10 MG tablet Take 10 mg by mouth once daily.    sodium bicarbonate 650 MG tablet Take 4 tablets BID  four hours before or after Harvoni    tramadol (ULTRAM-ER) 100 MG Tb24 Take 100 mg by mouth as needed.    albuterol-ipratropium 2.5mg-0.5mg/3mL nebulizer solution 3 mL Take 3 mLs by nebulization once.    tacrolimus (PROGRAF) 1 MG Cap Take 3mg in the AM and 2mg in the PM by mouth. Z94.0 Kidney Transplant           Clinical Reference Information           Your Vitals Were     BP Pulse Temp Resp Height Weight    179/94 74 97.7 °F (36.5 °C) (Oral) 16 5' 10" (1.778 m) 90.7 kg (200 lb)    SpO2 BMI             95% 28.7 kg/m2         Allergies as of 4/23/2017        Reactions    " Tylenol [Acetaminophen]     Told not to take per Transplant Team      Immunizations Administered on Date of Encounter - 4/23/2017     None      ED Micro, Lab, POCT     Start Ordered       Status Ordering Provider    04/23/17 1039 04/23/17 1039  CBC auto differential  STAT      Final result     04/23/17 1039 04/23/17 1039  Comprehensive metabolic panel  STAT      Final result     04/23/17 1039 04/23/17 1039  Brain natriuretic peptide  STAT      Final result     04/23/17 1039 04/23/17 1039  Troponin I  STAT      Final result     04/23/17 1039 04/23/17 1039  Protime-INR  STAT      Final result       ED Imaging Orders     Start Ordered       Status Ordering Provider    04/23/17 1039 04/23/17 1039  X-Ray Chest PA And Lateral  1 time imaging      Final result         Discharge Instructions         Shortness of Breath (Dyspnea)  Shortness of breath is the feeling that you can't catch your breath or get enough air. It is also known as dyspnea.  Dyspnea can be caused by many different conditions. They include:  · Acute asthma attack.  · Worsening of chronic lung diseases such as chronic bronchitis and emphysema.  · Heart failure. This is when weak heart muscle allows extra fluid to collect in the lungs.  · Panic attacks or anxiety. Fear can cause rapid breathing (hyperventilation).  · Pneumonia, or an infection in the lung tissue.  · Exposure to toxic substances, fumes, smoke, or certain medicines.  · Blood clot in the lung (pulmonary embolism). This is often from a piece of blood clot in a deep vein of the leg (deep vein thrombosis) that breaks off and travels to the lungs.  · Heart attack or heart-related chest pain (angina).  · Anemia.  · Collapsed lung (pneumothorax).  · Dehydration.  · Pregnancy.  Based on your visit today, the exact cause of your shortness of breath is not certain. Your tests dont show any of the serious causes of dyspnea. You may need other tests to find out if you have a serious problem. Its  important to watch for any new symptoms or symptoms that get worse. Follow up with your healthcare provider as directed.  Home care  Follow these tips to take care of yourself at home:  · When your symptoms are better, go back to your usual activities.  · If you smoke, you should stop. Join a quit-smoking program or ask your healthcare provider for help.  · Eat a healthy diet and get plenty of sleep.  · Get regular exercise. Talk with your healthcare provider before starting to exercise, especially if you have other medical problems.  · Cut down on the amount of caffeine and stimulants you consume.  Follow-up care  Follow up with your healthcare provider, or as advised.  If tests were done, you will be told if your treatment needs to be changed. You can call as directed for the results.  (Note: If an X-ray was taken, a specialist will review it. You will be notified of any new findings that may affect your care.)  Call 911 or get immediate medical care  Shortness of breath may be a sign of a serious medical problem. For example, it may be a problem with your heart or lungs. Call 911 if you have worsening shortness of breath or trouble breathing, especially with any of the symptoms below:  · You are confused or its difficult to wake you.  · You faint or lose consciousness.  · You have a fast heartbeat, or your heartbeat is irregular.  · You are coughing up blood.  · You have pain in your chest, arm, shoulder, neck, or upper back.  · You break out in a sweat.  When to seek medical advice  Call your healthcare provider right away if any of these occur:  · Slight shortness of breath or wheezing  · Redness, pain or swelling in your leg, arm, or other body area  · Swelling in both legs or ankles  · Fast weight gain  · Dizziness or weakness  · Fever of 100.4ºF (38ºC) or higher, or as directed by your healthcare provider  Date Last Reviewed: 9/13/2015  © 2159-1926 [employer]. [address] [place] [address],  [place] [address]. All rights reserved. This information is not intended as a substitute for professional medical care. Always follow your healthcare professional's instructions.          Your Scheduled Appointments     Apr 24, 2017 11:20 AM CDT   Fasting Lab with [facility]    [address] [address]   [phone]            May 08, 2017  9:10 AM CDT   Fasting Lab with [facility]    [address] [facility] [facility] [facility] [place] [address]   [phone]            May 08, 2017 10:10 AM CDT   Urine with SPECIMEN, [facility]    [address]  [place] [address]   [phone]            May 10, 2017  2:00 PM CDT   Established Patient with Byrce Badillo MD   [facility] ([facility] )    [address]  [place] [address] [phone]            May 10, 2017  4:00 PM CDT   Diabetes Ed Follow-Up with Dana Morillo RD, [facility] - Diabetes Management ([facility])    [address] [facility] [place] [address] [phone]              Smoking Cessation     If you would like to quit smoking:   You may be eligible for free services if you are a [place] resident and started smoking cigarettes before September 1, 1988.  Call the Smoking Cessation Trust (SCT) toll free at (997) 666-9106 or (566) 477-4495.   Call -950-QUIT-NOW if you do not meet the above criteria.   Contact us via email: [email or web address]   View our website for more information: [email or web address]         [facility]  complies with applicable Federal civil rights laws and does not discriminate on the basis of race, color, national origin, age, disability, or sex.        Language Assistance Services     ATTENTION: Language assistance services are available, free of charge. Please call [phone].      ATENCIÓN: Si  habla español, tiene a hollis disposición servicios gratuitos de asistencia lingüística. Llame al [phone].     CHÚ Ý: N?u b?n nói Ti?ng Vi?t, có các d?ch v? h? tr? ngôn ng? mi?n phí dành cho b?n. G?i s? [phone].

## 2017-04-24 DIAGNOSIS — E11.65 TYPE 2 DIABETES MELLITUS WITH HYPERGLYCEMIA, WITH LONG-TERM CURRENT USE OF INSULIN: ICD-10-CM

## 2017-04-24 DIAGNOSIS — Z79.4 TYPE 2 DIABETES MELLITUS WITH HYPERGLYCEMIA, WITH LONG-TERM CURRENT USE OF INSULIN: ICD-10-CM

## 2017-04-26 ENCOUNTER — TELEPHONE (OUTPATIENT)
Dept: TRANSPLANT | Facility: CLINIC | Age: 62
End: 2017-04-26

## 2017-05-04 ENCOUNTER — HOSPITAL ENCOUNTER (EMERGENCY)
Facility: HOSPITAL | Age: 62
Discharge: HOME | End: 2017-05-04
Attending: EMERGENCY MEDICINE
Payer: MEDICARE

## 2017-05-04 VITALS
WEIGHT: 200 LBS | DIASTOLIC BLOOD PRESSURE: 69 MMHG | HEIGHT: 70 IN | RESPIRATION RATE: 18 BRPM | SYSTOLIC BLOOD PRESSURE: 123 MMHG | OXYGEN SATURATION: 98 % | BODY MASS INDEX: 28.63 KG/M2 | TEMPERATURE: 98 F | HEART RATE: 76 BPM

## 2017-05-04 DIAGNOSIS — J18.9 PNEUMONIA OF RIGHT LOWER LOBE DUE TO INFECTIOUS ORGANISM: Primary | ICD-10-CM

## 2017-05-04 DIAGNOSIS — N28.9 RENAL INSUFFICIENCY: ICD-10-CM

## 2017-05-04 DIAGNOSIS — R06.00 DYSPNEA: ICD-10-CM

## 2017-05-04 LAB
ALBUMIN SERPL BCP-MCNC: 3.3 G/DL
ALP SERPL-CCNC: 90 U/L
ALT SERPL W/O P-5'-P-CCNC: 15 U/L
ANION GAP SERPL CALC-SCNC: 9 MMOL/L
AST SERPL-CCNC: 16 U/L
BASOPHILS # BLD AUTO: 0.02 K/UL
BASOPHILS NFR BLD: 0.5 %
BILIRUB SERPL-MCNC: 0.6 MG/DL
BNP SERPL-MCNC: 170 PG/ML
BUN SERPL-MCNC: 26 MG/DL
CALCIUM SERPL-MCNC: 8.9 MG/DL
CHLORIDE SERPL-SCNC: 105 MMOL/L
CK MB SERPL-MCNC: 2 NG/ML
CK MB SERPL-RTO: 5.9 %
CK SERPL-CCNC: 34 U/L
CK SERPL-CCNC: 34 U/L
CO2 SERPL-SCNC: 23 MMOL/L
CREAT SERPL-MCNC: 1.8 MG/DL
D DIMER PPP IA.FEU-MCNC: 1.73 MG/L FEU
DIFFERENTIAL METHOD: ABNORMAL
EOSINOPHIL # BLD AUTO: 0.1 K/UL
EOSINOPHIL NFR BLD: 2.1 %
ERYTHROCYTE [DISTWIDTH] IN BLOOD BY AUTOMATED COUNT: 13.7 %
EST. GFR  (AFRICAN AMERICAN): 45 ML/MIN/1.73 M^2
EST. GFR  (NON AFRICAN AMERICAN): 39 ML/MIN/1.73 M^2
GLUCOSE SERPL-MCNC: 232 MG/DL
HCT VFR BLD AUTO: 39 %
HGB BLD-MCNC: 12.7 G/DL
LYMPHOCYTES # BLD AUTO: 0.9 K/UL
LYMPHOCYTES NFR BLD: 23.9 %
MCH RBC QN AUTO: 31.1 PG
MCHC RBC AUTO-ENTMCNC: 32.6 %
MCV RBC AUTO: 95 FL
MONOCYTES # BLD AUTO: 0.6 K/UL
MONOCYTES NFR BLD: 14.9 %
NEUTROPHILS # BLD AUTO: 2.3 K/UL
NEUTROPHILS NFR BLD: 58.6 %
PLATELET # BLD AUTO: 173 K/UL
PMV BLD AUTO: 10.3 FL
POTASSIUM SERPL-SCNC: 4.2 MMOL/L
PROT SERPL-MCNC: 6.4 G/DL
RBC # BLD AUTO: 4.09 M/UL
SODIUM SERPL-SCNC: 137 MMOL/L
TROPONIN I SERPL DL<=0.01 NG/ML-MCNC: 0.02 NG/ML
WBC # BLD AUTO: 3.89 K/UL

## 2017-05-04 PROCEDURE — 83880 ASSAY OF NATRIURETIC PEPTIDE: CPT

## 2017-05-04 PROCEDURE — 85379 FIBRIN DEGRADATION QUANT: CPT

## 2017-05-04 PROCEDURE — 80053 COMPREHEN METABOLIC PANEL: CPT

## 2017-05-04 PROCEDURE — 84484 ASSAY OF TROPONIN QUANT: CPT

## 2017-05-04 PROCEDURE — 25000003 PHARM REV CODE 250: Performed by: EMERGENCY MEDICINE

## 2017-05-04 PROCEDURE — 82553 CREATINE MB FRACTION: CPT

## 2017-05-04 PROCEDURE — 93005 ELECTROCARDIOGRAM TRACING: CPT

## 2017-05-04 PROCEDURE — 87040 BLOOD CULTURE FOR BACTERIA: CPT | Mod: 59

## 2017-05-04 PROCEDURE — 93010 ELECTROCARDIOGRAM REPORT: CPT | Mod: ,,, | Performed by: INTERNAL MEDICINE

## 2017-05-04 PROCEDURE — 99285 EMERGENCY DEPT VISIT HI MDM: CPT | Mod: 25

## 2017-05-04 PROCEDURE — 85025 COMPLETE CBC W/AUTO DIFF WBC: CPT

## 2017-05-04 RX ORDER — AZITHROMYCIN 250 MG/1
500 TABLET, FILM COATED ORAL
Status: COMPLETED | OUTPATIENT
Start: 2017-05-04 | End: 2017-05-04

## 2017-05-04 RX ORDER — AZITHROMYCIN 250 MG/1
250 TABLET, FILM COATED ORAL DAILY
Qty: 4 TABLET | Refills: 0 | Status: SHIPPED | OUTPATIENT
Start: 2017-05-04 | End: 2017-05-08

## 2017-05-04 RX ORDER — AMOXICILLIN AND CLAVULANATE POTASSIUM 500; 125 MG/1; MG/1
1 TABLET, FILM COATED ORAL
Status: COMPLETED | OUTPATIENT
Start: 2017-05-04 | End: 2017-05-04

## 2017-05-04 RX ORDER — AMOXICILLIN AND CLAVULANATE POTASSIUM 500; 125 MG/1; MG/1
1 TABLET, FILM COATED ORAL 2 TIMES DAILY
Qty: 19 TABLET | Refills: 0 | Status: SHIPPED | OUTPATIENT
Start: 2017-05-04 | End: 2017-09-06 | Stop reason: ALTCHOICE

## 2017-05-04 RX ADMIN — AZITHROMYCIN 500 MG: 250 TABLET, FILM COATED ORAL at 02:05

## 2017-05-04 RX ADMIN — AMOXICILLIN AND CLAVULANATE POTASSIUM 500 MG: 500; 125 TABLET, FILM COATED ORAL at 02:05

## 2017-05-04 NOTE — ED NOTES
Pt lying in bed resting with eyes closed in NAD, VSS, RR equal and unlabored, orientated x3. Bed is low, locked, and call light in reach. Side rails up x 2. Pt updated on POC.

## 2017-05-04 NOTE — DISCHARGE INSTRUCTIONS
Pneumonia (Adult)  Pneumonia is an infection deep within the lungs. It is in the small air sacs (alveoli). Pneumonia may be caused by a virus or bacteria. Pneumonia caused by bacteria is usually treated with an antibiotic. Severe cases may need to be treated in the hospital. Milder cases can be treated at home. Symptoms usually start to get better during the first 2 days of treatment.    Home care  Follow these guidelines when caring for yourself at home:  · Rest at home for the first 2 to 3 days, or until you feel stronger. Dont let yourself get overly tired when you go back to your activities.  · Stay away from cigarette smoke - yours or other peoples.  · You may use acetaminophen or ibuprofen to control fever or pain, unless another medicine was prescribed. If you have chronic liver or kidney disease, talk with your health care provider before using these medicines. Also talk with your provider if youve had a stomach ulcer or GI bleeding. Dont give aspirin to anyone younger than 18 years of age who is ill with a fever. It may cause severe liver damage.  · Your appetite may be poor, so a light diet is fine.  · Drink 6 to 8 glasses of fluids every day to make sure you are getting enough fluids. Beverages can include water, sport drinks, sodas without caffeine, juices, tea, or soup. Fluids will help loosen secretions in the lung. This will make it easier for you to cough up the phlegm (sputum). If you also have heart or kidney disease, check with your health care provider before you drink extra fluids.  · Take antibiotic medicine prescribed until it is all gone, even if you are feeling better after a few days.  Follow-up care  Follow up with your health care provider in the next 2 to 3 days, or as advised. This is to be sure the medicine is helping you get better.  If you are 65 or older, you should get a pneumococcal vaccine and a yearly flu (influenza) shot. You should also get these vaccines if you have  chronic lung disease like asthma, emphysema, or COPD. Ask your provider about this.  When to seek medical advice  Call your health care provider right away if any of these occur:  · You dont get better within the first 48 hours of treatment  · Shortness of breath gets worse  · Rapid breathing (more than 25 breaths per minute)  · Coughing up blood  · Chest pain gets worse with breathing  · Fever of 102°F (38°C) or higher that doesnt get better with fever medicine  · Weakness, dizziness, or fainting that gets worse  · Thirst or dry mouth that gets worse  · Sinus pain, headache, or a stiff neck  · Chest pain not caused by coughing  Date Last Reviewed: 12/23/2014  © 9746-4356 [employer]. [address] [place] [address], [place] [address]. All rights reserved. This information is not intended as a substitute for professional medical care. Always follow your healthcare professional's instructions.

## 2017-05-04 NOTE — ED AVS SNAPSHOT
[facility] [address] [place] [address]               Lavelle Ladd   2017 10:01 AM   ED    Description:  Male : 1955   Department:  [facility]           Your Care was Coordinated By:     Provider Role From To    Viviana Mosquera DO Attending Provider 17 1006 --      Reason for Visit     Shortness of Breath           Diagnoses this Visit        Comments    Pneumonia of right lower lobe due to infectious organism    -  Primary     Dyspnea         Renal insufficiency           ED Disposition     None           To Do List           Follow-up Information     Follow up with [facility]. Schedule an appointment as soon as possible for a visit in 2 days.    Specialty:  Nephrology    Why:  Return to the ED for:   shortness of breath, difficulty breathing, chest pain, chest pressure, nausea, vomiting,, weakness or other concerns.     Contact information:    [address] [place] [address]  [phone]       These Medications        Disp Refills Start End    amoxicillin-clavulanate 500-125mg (AUGMENTIN) 500-125 mg Tab 19 tablet 0 2017     Take 1 tablet (500 mg total) by mouth 2 (two) times daily. - Oral    Pharmacy: [id number] - [place] - [address] [facility] Ph #: [phone]       azithromycin (Z-KOKI) 250 MG tablet 4 tablet 0 2017    Take 1 tablet (250 mg total) by mouth once daily. 1 every day until finished. - Oral    [id number] [place] [facility] [address] [facility] Ph #: [phone]         [facility]     [facility] Care [facility] - 24/ Assistance  Unless otherwise directed by your provider, please contact [facility], our nurse care line that is available for 24/ assistance.     Registered nurses in the [facility] provide: appointment scheduling, clinical advisement, health education, and other advisory  services.  Call: [phone] (toll free)               Medications           Message regarding Medications     Verify the changes and/or additions to your medication regime listed below are the same as discussed with your clinician today.  If any of these changes or additions are incorrect, please notify your healthcare provider.        START taking these NEW medications        Refills    amoxicillin-clavulanate 500-125mg (AUGMENTIN) 500-125 mg Tab 0    Sig: Take 1 tablet (500 mg total) by mouth 2 (two) times daily.    Class: Print    Route: Oral    azithromycin (Z-KOKI) 250 MG tablet 0    Sig: Take 1 tablet (250 mg total) by mouth once daily. 1 every day until finished.    Class: Print    Route: Oral      These medications were administered today        Dose Freq    amoxicillin-clavulanate 500-125mg per tablet 500 mg 1 tablet ED 1 Time    Sig: Take 1 tablet (500 mg total) by mouth ED 1 Time.    Class: Normal    Route: Oral    azithromycin tablet 500 mg 500 mg ED 1 Time    Sig: Take 2 tablets (500 mg total) by mouth ED 1 Time.    Class: Normal    Route: Oral           Verify that the below list of medications is an accurate representation of the medications you are currently taking.  If none reported, the list may be blank. If incorrect, please contact your healthcare provider. Carry this list with you in case of emergency.           Current Medications     albuterol (VENTOLIN HFA) 90 mcg/actuation inhaler Inhale 2 puffs into the lungs every 4 (four) hours as needed for Wheezing.    albuterol-ipratropium 2.5mg-0.5mg/3mL (DUO-NEB) 0.5 mg-3 mg(2.5 mg base)/3 mL nebulizer solution Take 3 mLs by nebulization every 6 (six) hours.    amoxicillin-clavulanate 500-125mg (AUGMENTIN) 500-125 mg Tab Take 1 tablet (500 mg total) by mouth 2 (two) times daily.    amoxicillin-clavulanate 500-125mg per tablet 500 mg Take 1 tablet (500 mg total) by mouth ED 1 Time.    aspirin (ECOTRIN) 81 MG EC tablet Take 1 tablet (81 mg total) by  "mouth once daily.    atovaquone (MEPRON) 750 mg/5 mL Susp Take 10 mLs (1,500 mg total) by mouth once daily.    azithromycin (Z-KOKI) 250 MG tablet Take 1 tablet (250 mg total) by mouth once daily. 1 every day until finished.    azithromycin tablet 500 mg Take 2 tablets (500 mg total) by mouth ED 1 Time.    BD INSULIN PEN NEEDLE UF SHORT 31 gauge x 5/16" Ndle     BD INSULIN SYRINGE ULTRA-FINE 1 mL 31 gauge x 5/16 Syrg     blood sugar diagnostic Strp 1 each by Misc.(Non-Drug; Combo Route) route 3 (three) times daily.    blood-glucose meter kit Use as instructed    budesonide-formoterol 160-4.5 mcg (SYMBICORT) 160-4.5 mcg/actuation HFAA Inhale 2 puffs into the lungs every 12 (twelve) hours. Wash out mouth after using    ergocalciferol (ERGOCALCIFEROL) 50,000 unit Cap Take 1 capsule (50,000 Units total) by mouth every 7 days. Take on Mondays    ERTAPENEM SODIUM (ERTAPENEM, INVANZ, 1 G/100 ML NS, READY TO MIX,) Inject 100 mLs (1 g total) into the vein once daily.    famotidine (PEPCID) 20 MG tablet Take 1 tablet (20 mg total) by mouth every evening.    inhalation device (BREATHERITE VALVED MDI CHAMBER) Use as directed for inhalation.    insulin NPH (NOVOLIN N) 100 unit/mL injection Take 25 units subq with breakfast and 15 units at bedtime    insulin regular (NOVOLIN R) 100 unit/mL Inj injection Inject 6 units with breakfast and 8 units with dinner, subcutaneously 30 min before meal.    insulin syringe,safetyneedle 1 mL 31 gauge x 5/16" Syrg 1 Syringe by Misc.(Non-Drug; Combo Route) route 4 (four) times daily with meals and nightly.    lancets Misc 1 each by Misc.(Non-Drug; Combo Route) route 3 (three) times daily.    ledipasvir-sofosbuvir  mg Tab Take by mouth once daily.    magnesium oxide (MAG-OX) 400 mg tablet Take 1 tablet (400 mg total) by mouth 2 (two) times daily.    metoprolol tartrate (LOPRESSOR) 50 MG tablet Take 0.5 tablets (25 mg total) by mouth 2 (two) times daily.    multivitamin (THERAGRAN) tablet " "Take 1 tablet by mouth once daily.    mycophenolate (CELLCEPT) 250 mg Cap Take 2 capsules (500 mg total) by mouth 2 (two) times daily. Z94.0 Kidney Transplant 5/21/2016.    olanzapine (ZYPREXA) 5 MG tablet Take 1 tablet (5 mg total) by mouth every evening.    ondansetron (ZOFRAN-ODT) 4 MG TbDL Take 2 tablets (8 mg total) by mouth every 8 (eight) hours as needed (nausea).    oxycodone (ROXICODONE) 10 mg Tab immediate release tablet     oxycodone (ROXICODONE) 15 MG Tab Take 1 tablet (15 mg total) by mouth every 6 (six) hours as needed for Pain.    pen needle, diabetic (EASY COMFORT PEN NEEDLES) 32 gauge x 5/32" Ndle Inject 1 each into the skin 3 (three) times daily.    pen needle, diabetic 31 gauge x 1/4" Ndle 1 each by Misc.(Non-Drug; Combo Route) route 4 (four) times daily.    predniSONE (DELTASONE) 10 MG tablet Take 10 mg by mouth once daily.    sodium bicarbonate 650 MG tablet Take 4 tablets BID  four hours before or after Harvoni    tacrolimus (PROGRAF) 1 MG Cap Take 3mg in the AM and 2mg in the PM by mouth. Z94.0 Kidney Transplant    tramadol (ULTRAM-ER) 100 MG Tb24 Take 100 mg by mouth as needed.           Clinical Reference Information           Your Vitals Were     BP Pulse Temp Resp Height Weight    126/73 73 97.9 °F (36.6 °C) (Oral) 19 5' 10" (1.778 m) 90.7 kg (200 lb)    SpO2 BMI             97% 28.7 kg/m2         Allergies as of 5/4/2017        Reactions    Tylenol [Acetaminophen]     Told not to take per Transplant Team      Immunizations Administered on Date of Encounter - 5/4/2017     None      ED Micro, Lab, POCT     Start Ordered       Status Ordering Provider    05/04/17 1318 05/04/17 1318  Blood Culture #1 **CANNOT BE ORDERED STAT**  Once      In process     05/04/17 1318 05/04/17 1318  Blood Culture #2 **CANNOT BE ORDERED STAT**  Once      In process     05/04/17 1109 05/04/17 1108  Troponin I  STAT      Final result     05/04/17 1109 05/04/17 1108  CPK  STAT      Final result     " 05/04/17 1109 05/04/17 1108  CK-MB  STAT      Final result     05/04/17 1108 05/04/17 1108  CBC auto differential  STAT      Final result     05/04/17 1108 05/04/17 1108  Comprehensive metabolic panel  STAT      Final result     05/04/17 1108 05/04/17 1108  Brain natriuretic peptide  STAT      Final result     05/04/17 1108 05/04/17 1108  D dimer, quantitative  STAT      Final result       ED Imaging Orders     Start Ordered       Status Ordering Provider    05/04/17 1213 05/04/17 1212  NM Lung Ventilation Perfusion Imaging  1 time imaging      Final result     05/04/17 1109 05/04/17 1108  X-Ray Chest PA And Lateral  1 time imaging      Final result         Discharge Instructions         Pneumonia (Adult)  Pneumonia is an infection deep within the lungs. It is in the small air sacs (alveoli). Pneumonia may be caused by a virus or bacteria. Pneumonia caused by bacteria is usually treated with an antibiotic. Severe cases may need to be treated in the hospital. Milder cases can be treated at home. Symptoms usually start to get better during the first 2 days of treatment.    Home care  Follow these guidelines when caring for yourself at home:  · Rest at home for the first 2 to 3 days, or until you feel stronger. Dont let yourself get overly tired when you go back to your activities.  · Stay away from cigarette smoke - yours or other peoples.  · You may use acetaminophen or ibuprofen to control fever or pain, unless another medicine was prescribed. If you have chronic liver or kidney disease, talk with your health care provider before using these medicines. Also talk with your provider if youve had a stomach ulcer or GI bleeding. Dont give aspirin to anyone younger than 18 years of age who is ill with a fever. It may cause severe liver damage.  · Your appetite may be poor, so a light diet is fine.  · Drink 6 to 8 glasses of fluids every day to make sure you are getting enough fluids. Beverages can include water,  sport drinks, sodas without caffeine, juices, tea, or soup. Fluids will help loosen secretions in the lung. This will make it easier for you to cough up the phlegm (sputum). If you also have heart or kidney disease, check with your health care provider before you drink extra fluids.  · Take antibiotic medicine prescribed until it is all gone, even if you are feeling better after a few days.  Follow-up care  Follow up with your health care provider in the next 2 to 3 days, or as advised. This is to be sure the medicine is helping you get better.  If you are 65 or older, you should get a pneumococcal vaccine and a yearly flu (influenza) shot. You should also get these vaccines if you have chronic lung disease like asthma, emphysema, or COPD. Ask your provider about this.  When to seek medical advice  Call your health care provider right away if any of these occur:  · You dont get better within the first 48 hours of treatment  · Shortness of breath gets worse  · Rapid breathing (more than 25 breaths per minute)  · Coughing up blood  · Chest pain gets worse with breathing  · Fever of 102°F (38°C) or higher that doesnt get better with fever medicine  · Weakness, dizziness, or fainting that gets worse  · Thirst or dry mouth that gets worse  · Sinus pain, headache, or a stiff neck  · Chest pain not caused by coughing  Date Last Reviewed: 12/23/2014  © 4445-4643 [employer]. [address] [place] [address]. All rights reserved. This information is not intended as a substitute for professional medical care. Always follow your healthcare professional's instructions.          Discharge References/Attachments     ADULT, PNEUMONIA (ENGLISH)    AMOXICILLIN; CLAVULANIC ACID TABLETS (ENGLISH)    AZITHROMYCIN TABLETS (ENGLISH)    RENAL INSUFFICIENCY (ENGLISH)      Your Scheduled Appointments     May 08, 2017  7:35 AM CDT   Fasting Lab with LAB[facility]   [facility]    [address]  [facility] [address]   [phone]            May 08, 2017  9:35 AM CDT   Urine with SPECIMEN LAB, TRANSPLANT   [facility]    [address]  [facility] [address]   [phone]            May 08, 2017 10:00 AM CDT   Established Patient with Bryce Badillo MD   [facility] ([facility] )    [address]  [facility] [address]   [phone]            May 10, 2017  4:00 PM CDT   Diabetes Ed Follow-Up with Dana Morillo RD, ANALY   [facility]    [address] [facility] [address]  [place] [id number] [phone]            Jun 13, 2017 11:30 AM CDT   Established Patient Visit with Amira Lynn Jr., [facility] - Diabetes Management ([facility])    [address] [facility] [address]  [place] [id number] [phone]              Smoking Cessation     If you would like to quit smoking:   You may be eligible for free services if you are a [place] resident and started smoking cigarettes before September 1, 1988.  Call the Smoking Cessation Trust ([facility]) toll free at (744) 183-1316 or (142) 044-8813.   Call 1-800-QUIT-NOW if you do not meet the above criteria.   Contact us via email: [email or web address]   View our website for more information: [email or web address]         [facility] complies with applicable Federal civil rights laws and does not discriminate on the basis of race, color, national origin, age, disability, or sex.        Language Assistance Services     ATTENTION: Language assistance services are available, free of charge. Please call [phone].      ATENCIÓN: Si habla español, tiene a hollis disposición servicios gratuitos de asistencia lingüística. Llame al [phone].     CHÚ Ý: N?u b?n nói Ti?ng Vi?t, có các d?ch v? h? tr? ngôn ng? mi?n phí dành cho b?n. G?i s? [phone].

## 2017-05-04 NOTE — ED PROVIDER NOTES
SCRIBE #1 NOTE: I, Jose Jordan/Edilma Fierro, am scribing for, and in the presence of, Viviana Mosquera DO. I have scribed the entire note.      History      Chief Complaint   Patient presents with    Shortness of Breath     x several weeks; hx of COPD; pt denies CP       Review of patient's allergies indicates:   Allergen Reactions    Tylenol [acetaminophen]      Told not to take per Transplant Team        HPI   HPI    2017, 10:52 AM   History obtained from the patient      History of Present Illness: Lavelle Ladd is a 61 y.o. male patient who presents to the Emergency Department for SOB which onset gradually for 2-3d ago. Symptoms are intermittent and moderate in severity. Pt states that he has been battling these sx for a few months now. No mitigating or exacerbating factors reported. Associated sxs include n/v and heartburn for the past 2 nights. Patient denies any CP, dysuria, trouble urinating, penile discharge, fever, chills, diaphoresis, leg pain/swelling, and all other sxs at this time. Pt states that he is concerned because he lives alone and has anxiety about his SOB. No further complaints or concerns at this time. Pt reports PMhx of CHF and DM.     Arrival mode: Personal vehicle    PCP: Rishabh Esteban MD       Past Medical History:  Past Medical History:   Diagnosis Date    DINORAH (acute kidney injury) 2016    Arthritis     Chronic obstructive pulmonary disease 2016    Coronary artery disease involving native coronary artery of native heart without angina pectoris 2016     donor kidney transplant for DM 16     Induction with Thymo x3 and IV solumedrol to total 875mg  Kidney Biopsy  2016: 16 glomeruli, ACR type 1 AVR type 2, significant microcirculatory changes, c4d negative, No DSA, 5 to10% fibrosis. Treated with thymo x8 2016- no rejection      Diastolic heart failure     DM2 (diabetes mellitus, type 2)     Encounter for blood transfusion     ESRD  "on RRT since 10/2013 10/29/2013    Biopsy proven diabetic nephropathy and lymphoplasmacytic interstitial infiltrate not c/w with AIN (ddx sjogrens or assoc with tamm-horsefall protein extravasation)     GERD (gastroesophageal reflux disease)     History of hepatitis C, s/p successful Rx w/ SVR12 - 4/2017 4/5/2017    Completed 12 weeks harvoni w/ SVR    Hyperlipidemia     Hypertension     Prophylactic immunotherapy     Proteinuria     Reactive airway disease     Renal hypertension     Type 2 diabetes mellitus with diabetic neuropathy, with long-term current use of insulin 12/1/2016    Type 2 diabetes mellitus with hyperglycemia, with long-term current use of insulin     Type 2 diabetes mellitus with retinopathy, with long-term current use of insulin 12/1/2016    Vitamin B12 deficiency        Past Surgical History:  Past Surgical History:   Procedure Laterality Date    av bovine graft      Left UE    AV FISTULA PLACEMENT      left UE    CARDIAC CATHETERIZATION  02/2015    KIDNEY TRANSPLANT  05/21/2016    LEG AMPUTATION THROUGH KNEE  2011    right LE, started as nail puncture leading to diabetic ulcer         Family History:  Family History   Problem Relation Age of Onset    Cancer Father     Diabetes Father     Heart failure Father     Stroke Father     Heart failure Mother     Kidney disease Neg Hx        Social History:  Social History     Social History Main Topics    Smoking status: Former Smoker     Packs/day: 1.00     Years: 40.00     Quit date: 1/11/2013    Smokeless tobacco: Never Used    Alcohol use No    Drug use: No    Sexual activity: No       ROS   Review of Systems   Constitutional: Negative for chills and fever.   HENT: Negative for congestion and sore throat.    Respiratory: Positive for shortness of breath. Negative for cough and chest tightness.    Cardiovascular: Negative for chest pain.        (+)"heart burn"   Gastrointestinal: Positive for nausea and vomiting. Negative " "for abdominal pain.   Genitourinary: Negative for discharge, dysuria and hematuria.   Musculoskeletal: Negative for back pain and neck pain.   Skin: Negative for rash.   Neurological: Negative for dizziness, numbness and headaches.   Psychiatric/Behavioral: Negative for agitation and confusion.   All other systems reviewed and are negative.      Physical Exam    Initial Vitals   BP Pulse Resp Temp SpO2   05/04/17 0956 05/04/17 0956 05/04/17 0956 05/04/17 0956 05/04/17 0956   152/71 78 22 97.9 °F (36.6 °C) 95 %      Physical Exam  Nursing Notes and Vital Signs Reviewed.  Constitutional: Patient is in no apparent distress. Awake and alert. Well-developed and well-nourished.  Head: Atraumatic. Normocephalic.  Eyes: PERRL. EOM intact. Conjunctivae are not pale. No scleral icterus.  ENT: Mucous membranes are moist. Oropharynx is clear and symmetric.    Neck: Supple. Full ROM. No lymphadenopathy.  Cardiovascular: Regular rate. Regular rhythm. No murmurs, rubs, or gallops. Distal pulses are 2+ and symmetric.  Pulmonary/Chest: No respiratory distress. Clear to auscultation bilaterally. No wheezing, rales, or rhonchi.  Abdominal: Soft and non-distended.  There is no tenderness.  No rebound, guarding, or rigidity. Good bowel sounds.  Musculoskeletal: Moves all extremities. No obvious deformities. No edema. R below the knee amputation noted.   Skin: Warm and dry.  Neurological:  Alert, awake, and appropriate.  Normal speech.  No acute focal neurological deficits are appreciated.  Psychiatric: Normal affect. Good eye contact. Appropriate in content.    ED Course    Procedures  ED Vital Signs:  Vitals:    05/04/17 0956 05/04/17 1009 05/04/17 1017 05/04/17 1118   BP: (!) 152/71  123/67 128/88   Pulse: 78 67 75 77   Resp: (!) 22  16 19   Temp: 97.9 °F (36.6 °C)      TempSrc: Oral      SpO2: 95%  99% 97%   Weight: 90.7 kg (200 lb)      Height: 5' 10" (1.778 m)       05/04/17 1256   BP: 126/73   Pulse: 73   Resp: 19   Temp:  "   [facility]    SpO2: 97%   Weight:    Height:        Abnormal Lab Results:  Labs Reviewed   CBC W/ AUTO DIFFERENTIAL - Abnormal; Notable for the following:        Result Value    WBC 3.89 (*)     RBC 4.09 (*)     Hemoglobin 12.7 (*)     Hematocrit 39.0 (*)     MCH 31.1 (*)     Lymph # 0.9 (*)     All other components within normal limits   COMPREHENSIVE METABOLIC PANEL - Abnormal; Notable for the following:     Glucose 232 (*)     BUN, Bld 26 (*)     Creatinine 1.8 (*)     Albumin 3.3 (*)     eGFR if  45 (*)     eGFR if non  39 (*)     All other components within normal limits   B-TYPE NATRIURETIC PEPTIDE - Abnormal; Notable for the following:      (*)     All other components within normal limits   D DIMER, QUANTITATIVE - Abnormal; Notable for the following:     D-Dimer 1.73 (*)     All other components within normal limits   CK-MB - Abnormal; Notable for the following:     MB% 5.9 (*)     All other components within normal limits   CULTURE, BLOOD   CULTURE, BLOOD   TROPONIN I   CK        All Lab Results:  Results for orders placed or performed during the hospital encounter of 05/04/17   CBC auto differential   Result Value Ref Range    WBC 3.89 (L) 3.90 - 12.70 K/uL    RBC 4.09 (L) 4.60 - 6.20 M/uL    Hemoglobin 12.7 (L) 14.0 - 18.0 g/dL    Hematocrit 39.0 (L) 40.0 - 54.0 %    MCV 95 82 - 98 fL    MCH 31.1 (H) 27.0 - 31.0 pg    MCHC 32.6 32.0 - 36.0 %    RDW 13.7 11.5 - 14.5 %    Platelets 173 150 - 350 K/uL    MPV 10.3 9.2 - 12.9 fL    Gran # 2.3 1.8 - 7.7 K/uL    Lymph # 0.9 (L) 1.0 - 4.8 K/uL    Mono # 0.6 0.3 - 1.0 K/uL    Eos # 0.1 0.0 - 0.5 K/uL    Baso # 0.02 0.00 - 0.20 K/uL    Gran% 58.6 38.0 - 73.0 %    Lymph% 23.9 18.0 - 48.0 %    Mono% 14.9 4.0 - 15.0 %    Eosinophil% 2.1 0.0 - 8.0 %    Basophil% 0.5 0.0 - 1.9 %    Differential Method Automated    Comprehensive metabolic panel   Result Value Ref Range    Sodium 137 136 - 145 mmol/L    Potassium 4.2 3.5 - 5.1 mmol/L     Chloride 105 95 - 110 mmol/L    CO2 23 23 - 29 mmol/L    Glucose 232 (H) 70 - 110 mg/dL    BUN, Bld 26 (H) 8 - 23 mg/dL    Creatinine 1.8 (H) 0.5 - 1.4 mg/dL    Calcium 8.9 8.7 - 10.5 mg/dL    Total Protein 6.4 6.0 - 8.4 g/dL    Albumin 3.3 (L) 3.5 - 5.2 g/dL    Total Bilirubin 0.6 0.1 - 1.0 mg/dL    Alkaline Phosphatase 90 55 - 135 U/L    AST 16 10 - 40 U/L    ALT 15 10 - 44 U/L    Anion Gap 9 8 - 16 mmol/L    eGFR if African American 45 (A) >60 mL/min/1.73 m^2    eGFR if non African American 39 (A) >60 mL/min/1.73 m^2   Brain natriuretic peptide   Result Value Ref Range     (H) 0 - 99 pg/mL   D dimer, quantitative   Result Value Ref Range    D-Dimer 1.73 (H) <0.50 mg/L FEU   Troponin I   Result Value Ref Range    Troponin I 0.017 0.000 - 0.026 ng/mL   CPK   Result Value Ref Range    CPK 34 20 - 200 U/L   CK-MB   Result Value Ref Range    CPK 34 20 - 200 U/L    CPK MB 2.0 0.1 - 6.5 ng/mL    MB% 5.9 (H) 0.0 - 5.0 %         Imaging Results:  Imaging Results         NM Lung Ventilation Perfusion Imaging (Final result) Result time:  05/04/17 13:30:30    Final result by Cierra Serrano MD (05/04/17 13:30:30)    Impression:         Low probability for pulmonary embolus.      Electronically signed by: CIERRA SERRANO MD  Date:     05/04/17  Time:    13:30     Narrative:    Exam: Nuclear medicine ventilation/perfusion scan    Clinical History:     Findings:     The patient received  1  mCi of technetium 99 DTPA for ventilation images and 6 mCi of technetium 99 MAA for perfusion images. Ventilation images appear normal. Perfusion images demonstrate homogeneous activity no focal segmental or lobar defects.            X-Ray Chest PA And Lateral (Final result) Result time:  05/04/17 11:36:31    Final result by Cierra Serrano MD (05/04/17 11:36:31)    Impression:         Right lower lobe interstitial infiltrates.  Recommend followup after treatment.            Electronically signed by: CIERRA SERRANO MD  Date:      05/04/17  Time:    11:36     Narrative:    Exam: XR CHEST PA AND LATERAL    Clinical History:   Unspecified dyspnea     Findings:   Hazy reticular-nodular interstitial opacities have developed in the right lower lobe.  Left basilar atelectasis.  Aortic atherosclerosis. The cardiac silhouette is within normal limits.               The EKG was ordered, reviewed, and independently interpreted by the ED provider.  Interpretation time: 1008  Rate: 78 BPM  Rhythm: normal sinus rhythm  Interpretation: septal intract. No STEMI.             The Emergency Provider reviewed the vital signs and test results, which are outlined above.    ED Discussion     1:40 PM: Dr. Mosquera discussed the pt's case with Dr. Cherry (Nephrology) who recommends placing pt on Zithromax and Augmentin.     2:09 PM: Re-evaluated pt. Pt is resting comfortably and is in no acute distress.  D/w pt risks and benefits of Abx due to his sx. Pt agreed to be tx.  D/w pt any concerns expressed at this time. Answered all questions. Pt expresses understanding at this time.    2:19 PM: Reassessed pt at this time.  Pt states his condition has improved slightly at this time. Discussed with pt all pertinent ED information and results. Discussed pt dx and plan of tx. Gave pt all f/u and return to the ED instructions. All questions and concerns were addressed at this time. Pt expresses understanding of information and instructions, and is comfortable with plan to discharge. Pt is stable for discharge.    ED Medication(s):  Medications   amoxicillin-clavulanate 500-125mg per tablet 500 mg (not administered)   azithromycin tablet 500 mg (not administered)       New Prescriptions    AMOXICILLIN-CLAVULANATE 500-125MG (AUGMENTIN) 500-125 MG TAB    Take 1 tablet (500 mg total) by mouth 2 (two) times daily.    AZITHROMYCIN (Z-KOKI) 250 MG TABLET    Take 1 tablet (250 mg total) by mouth once daily. 1 every day until finished.       Follow-up Information     Follow up  with [facility]. Schedule an appointment as soon as possible for a visit in 2 days.    Specialty:  Nephrology    Why:  Return to the ED for:   shortness of breath, difficulty breathing, chest pain, chest pressure, nausea, vomiting,, weakness or other concerns.     Contact information:    [address] [address] [place] [address] [phone]            Medical Decision Making    Medical Decision Making:   Clinical Tests:   Lab Tests: Reviewed and Ordered  Radiological Study: Reviewed and Ordered  Medical Tests: Ordered and Reviewed           Scribe Attestation:   Scribe #1: I performed the above scribed service and the documentation accurately describes the services I performed. I attest to the accuracy of the note.    Attending:   Physician Attestation Statement for Scribe #1: I, Viviana Mosquera DO, personally performed the services described in this documentation, as scribed by Jose Jordan/Edilma Fierro, in my presence, and it is both accurate and complete.          Clinical Impression       ICD-10-CM ICD-9-CM   1. Pneumonia of right lower lobe due to infectious organism J18.1 486   2. Dyspnea R06.00 786.09   3. Renal insufficiency N28.9 593.9       Disposition:   Disposition: Discharged  Condition: Stable         Viviana Mosquera DO  05/04/17 1713

## 2017-05-05 ENCOUNTER — LAB VISIT (OUTPATIENT)
Dept: LAB | Facility: HOSPITAL | Age: 62
End: 2017-05-05
Attending: INTERNAL MEDICINE
Payer: MEDICARE

## 2017-05-05 DIAGNOSIS — Z94.0 KIDNEY REPLACED BY TRANSPLANT: ICD-10-CM

## 2017-05-05 LAB
BILIRUB UR QL STRIP: NEGATIVE
CLARITY UR REFRACT.AUTO: CLEAR
COLOR UR AUTO: YELLOW
CREAT UR-MCNC: 53 MG/DL
GLUCOSE UR QL STRIP: NEGATIVE
HGB UR QL STRIP: NEGATIVE
KETONES UR QL STRIP: NEGATIVE
LEUKOCYTE ESTERASE UR QL STRIP: NEGATIVE
NITRITE UR QL STRIP: NEGATIVE
PH UR STRIP: 7 [PH] (ref 5–8)
PROT UR QL STRIP: NEGATIVE
PROT UR-MCNC: 13 MG/DL
PROT/CREAT RATIO, UR: 0.25
SP GR UR STRIP: 1.01 (ref 1–1.03)
URN SPEC COLLECT METH UR: NORMAL
UROBILINOGEN UR STRIP-ACNC: NEGATIVE EU/DL

## 2017-05-05 PROCEDURE — 81003 URINALYSIS AUTO W/O SCOPE: CPT

## 2017-05-05 PROCEDURE — 82570 ASSAY OF URINE CREATININE: CPT

## 2017-05-08 ENCOUNTER — HOSPITAL ENCOUNTER (EMERGENCY)
Facility: HOSPITAL | Age: 62
Discharge: HOME | End: 2017-05-08
Attending: EMERGENCY MEDICINE
Payer: MEDICARE

## 2017-05-08 VITALS
HEIGHT: 70 IN | HEART RATE: 67 BPM | BODY MASS INDEX: 27.92 KG/M2 | SYSTOLIC BLOOD PRESSURE: 155 MMHG | DIASTOLIC BLOOD PRESSURE: 62 MMHG | TEMPERATURE: 99 F | WEIGHT: 195 LBS | OXYGEN SATURATION: 98 % | RESPIRATION RATE: 14 BRPM

## 2017-05-08 DIAGNOSIS — Z94.0 S/P KIDNEY TRANSPLANT: Chronic | ICD-10-CM

## 2017-05-08 DIAGNOSIS — E16.2 HYPOGLYCEMIA: ICD-10-CM

## 2017-05-08 DIAGNOSIS — Z79.60 LONG-TERM USE OF IMMUNOSUPPRESSANT MEDICATION: ICD-10-CM

## 2017-05-08 DIAGNOSIS — R06.02 SHORTNESS OF BREATH: Primary | ICD-10-CM

## 2017-05-08 LAB
ABO + RH BLD: NORMAL
ALBUMIN SERPL BCP-MCNC: 3.6 G/DL
ALP SERPL-CCNC: 68 U/L
ALT SERPL W/O P-5'-P-CCNC: 13 U/L
ANION GAP SERPL CALC-SCNC: 11 MMOL/L
APTT BLDCRRT: 23.6 SEC
AST SERPL-CCNC: 20 U/L
BASOPHILS # BLD AUTO: 0.02 K/UL
BASOPHILS NFR BLD: 0.4 %
BILIRUB SERPL-MCNC: 0.4 MG/DL
BLD GP AB SCN CELLS X3 SERPL QL: NORMAL
BNP SERPL-MCNC: 105 PG/ML
BUN SERPL-MCNC: 15 MG/DL
CALCIUM SERPL-MCNC: 9.4 MG/DL
CHLORIDE SERPL-SCNC: 105 MMOL/L
CO2 SERPL-SCNC: 27 MMOL/L
CREAT SERPL-MCNC: 1.7 MG/DL
DIFFERENTIAL METHOD: NORMAL
EOSINOPHIL # BLD AUTO: 0.1 K/UL
EOSINOPHIL NFR BLD: 2 %
ERYTHROCYTE [DISTWIDTH] IN BLOOD BY AUTOMATED COUNT: 13.5 %
EST. GFR  (AFRICAN AMERICAN): 49 ML/MIN/1.73 M^2
EST. GFR  (NON AFRICAN AMERICAN): 42 ML/MIN/1.73 M^2
GLUCOSE SERPL-MCNC: 40 MG/DL
HCT VFR BLD AUTO: 44.1 %
HGB BLD-MCNC: 14.4 G/DL
INR PPP: 1
LACTATE SERPL-SCNC: 1.4 MMOL/L
LIPASE SERPL-CCNC: 20 U/L
LYMPHOCYTES # BLD AUTO: 1.3 K/UL
LYMPHOCYTES NFR BLD: 26 %
MAGNESIUM SERPL-MCNC: 1.9 MG/DL
MCH RBC QN AUTO: 31 PG
MCHC RBC AUTO-ENTMCNC: 32.7 %
MCV RBC AUTO: 95 FL
MONOCYTES # BLD AUTO: 0.5 K/UL
MONOCYTES NFR BLD: 9.2 %
NEUTROPHILS # BLD AUTO: 3.2 K/UL
NEUTROPHILS NFR BLD: 62.4 %
PHOSPHATE SERPL-MCNC: 2.9 MG/DL
PLATELET # BLD AUTO: 229 K/UL
PMV BLD AUTO: 9.9 FL
POCT GLUCOSE: 101 MG/DL (ref 70–110)
POTASSIUM SERPL-SCNC: 3.7 MMOL/L
PROT SERPL-MCNC: 7.1 G/DL
PROTHROMBIN TIME: 10.5 SEC
RBC # BLD AUTO: 4.64 M/UL
SODIUM SERPL-SCNC: 143 MMOL/L
TROPONIN I SERPL DL<=0.01 NG/ML-MCNC: 0.02 NG/ML
TSH SERPL DL<=0.005 MIU/L-ACNC: 1.33 UIU/ML
WBC # BLD AUTO: 5.11 K/UL

## 2017-05-08 PROCEDURE — 93010 ELECTROCARDIOGRAM REPORT: CPT | Mod: ,,, | Performed by: INTERNAL MEDICINE

## 2017-05-08 PROCEDURE — 96360 HYDRATION IV INFUSION INIT: CPT

## 2017-05-08 PROCEDURE — 83605 ASSAY OF LACTIC ACID: CPT

## 2017-05-08 PROCEDURE — 83735 ASSAY OF MAGNESIUM: CPT

## 2017-05-08 PROCEDURE — 84100 ASSAY OF PHOSPHORUS: CPT

## 2017-05-08 PROCEDURE — 85610 PROTHROMBIN TIME: CPT

## 2017-05-08 PROCEDURE — 83690 ASSAY OF LIPASE: CPT

## 2017-05-08 PROCEDURE — 85730 THROMBOPLASTIN TIME PARTIAL: CPT

## 2017-05-08 PROCEDURE — 25000003 PHARM REV CODE 250: Performed by: EMERGENCY MEDICINE

## 2017-05-08 PROCEDURE — 80053 COMPREHEN METABOLIC PANEL: CPT

## 2017-05-08 PROCEDURE — 84443 ASSAY THYROID STIM HORMONE: CPT

## 2017-05-08 PROCEDURE — 96361 HYDRATE IV INFUSION ADD-ON: CPT

## 2017-05-08 PROCEDURE — 85025 COMPLETE CBC W/AUTO DIFF WBC: CPT

## 2017-05-08 PROCEDURE — 83880 ASSAY OF NATRIURETIC PEPTIDE: CPT

## 2017-05-08 PROCEDURE — 86850 RBC ANTIBODY SCREEN: CPT

## 2017-05-08 PROCEDURE — 84484 ASSAY OF TROPONIN QUANT: CPT

## 2017-05-08 PROCEDURE — 87040 BLOOD CULTURE FOR BACTERIA: CPT

## 2017-05-08 PROCEDURE — 86900 BLOOD TYPING SEROLOGIC ABO: CPT

## 2017-05-08 PROCEDURE — 99284 EMERGENCY DEPT VISIT MOD MDM: CPT | Mod: 25

## 2017-05-08 RX ADMIN — SODIUM CHLORIDE 1000 ML: 0.9 INJECTION, SOLUTION INTRAVENOUS at 11:05

## 2017-05-08 NOTE — ED NOTES
Lab reported a critical lab value:  Gluc: 40.   Md Notified. New orders received to treat pt per protocol.  Pt stated he would not take oral glucose, pt agreed to consume apple juice x2, and venus crackers x2 packs. Pt stated he did not feel weak or dizzy. Pt A&Ox3, able to sit up and ambulate.

## 2017-05-08 NOTE — ED PROVIDER NOTES
"SCRIBE #1 NOTE: I, Edilma Fierro, am scribing for, and in the presence of, Dinesh Garvin MD. I have scribed the entire note.      History      Chief Complaint   Patient presents with    Shortness of Breath     "i was here this weekend and dx with pneamonia, they told me to come back if I didn't get any better". reports increase shortness of breath       Review of patient's allergies indicates:   Allergen Reactions    Tylenol [acetaminophen]      Told not to take per Transplant Team        HPI   HPI    2017, 10:47 AM   History obtained from the patient      History of Present Illness: Lavelle Ladd is a 61 y.o. male patient who presents to the Emergency Department for SOB which onset gradually 1 week ago. Symptoms are constant and moderate in severity. No mitigating or exacerbating factors reported. Associated sxs include productive cough and RUQ abd pain. Patient denies any recent travel, long car trips, fever, leg pain/swelling, CP, N/V, and all other sxs at this time. Prior Tx includes ABX with no improvement secondary to dx of pneumonia 4 days ago. No further complaints or concerns at this time.     Arrival mode: Personal vehicle      PCP: Rishabh Esteban MD       Past Medical History:  Past Medical History:   Diagnosis Date    DINORAH (acute kidney injury) 2016    Arthritis     CHF (congestive heart failure)     Chronic obstructive pulmonary disease 2016    Coronary artery disease involving native coronary artery of native heart without angina pectoris 2016     donor kidney transplant for DM 16     Induction with Thymo x3 and IV solumedrol to total 875mg  Kidney Biopsy  2016: 16 glomeruli, ACR type 1 AVR type 2, significant microcirculatory changes, c4d negative, No DSA, 5 to10% fibrosis. Treated with thymo x8 2016- no rejection      Diastolic heart failure     DM2 (diabetes mellitus, type 2)     Encounter for blood transfusion     ESRD on RRT since 10/2013 " 10/29/2013    Biopsy proven diabetic nephropathy and lymphoplasmacytic interstitial infiltrate not c/w with AIN (ddx sjogrens or assoc with tamm-horsefall protein extravasation)     GERD (gastroesophageal reflux disease)     History of hepatitis C, s/p successful Rx w/ SVR12 - 4/2017 4/5/2017    Completed 12 weeks harvoni w/ SVR    Hyperlipidemia     Hypertension     Prophylactic immunotherapy     Proteinuria     Reactive airway disease     Renal hypertension     Type 2 diabetes mellitus with diabetic neuropathy, with long-term current use of insulin 12/1/2016    Type 2 diabetes mellitus with hyperglycemia, with long-term current use of insulin     Type 2 diabetes mellitus with retinopathy, with long-term current use of insulin 12/1/2016    Vitamin B12 deficiency        Past Surgical History:  Past Surgical History:   Procedure Laterality Date    av bovine graft      Left UE    AV FISTULA PLACEMENT      left UE    CARDIAC CATHETERIZATION  02/2015    KIDNEY TRANSPLANT  05/21/2016    LEG AMPUTATION THROUGH KNEE  2011    right LE, started as nail puncture leading to diabetic ulcer         Family History:  Family History   Problem Relation Age of Onset    Cancer Father     Diabetes Father     Heart failure Father     Stroke Father     Heart failure Mother     Kidney disease Neg Hx        Social History:  Social History     Social History Main Topics    Smoking status: Former Smoker     Packs/day: 1.00     Years: 40.00     Quit date: 1/11/2013    Smokeless tobacco: Never Used    Alcohol use No    Drug use: No    Sexual activity: No       ROS   Review of Systems   Constitutional: Negative for fever.        -recent travel   HENT: Negative for sore throat.    Respiratory: Positive for cough and shortness of breath.    Cardiovascular: Negative for chest pain.   Gastrointestinal: Positive for abdominal pain. Negative for nausea and vomiting.   Genitourinary: Negative for dysuria.   Musculoskeletal:  "Negative for back pain.        -leg pain/swelling   Skin: Negative for rash.   Neurological: Negative for weakness.   Hematological: Does not bruise/bleed easily.       Physical Exam    Initial Vitals   BP Pulse Resp Temp SpO2   05/08/17 1001 05/08/17 1001 05/08/17 1001 05/08/17 1001 05/08/17 1001   195/94 66 22 97.5 °F (36.4 °C) 97 %      Physical Exam  Nursing Notes and Vital Signs Reviewed.  Constitutional: Patient is in no apparent distress. Awake and alert. Well-developed and well-nourished.  Head: Atraumatic. Normocephalic.  Eyes: PERRL. EOM intact. Conjunctivae are not pale. No scleral icterus.  ENT: Mucous membranes are moist. Oropharynx is clear and symmetric.    Neck: Supple. Full ROM. No lymphadenopathy.  Cardiovascular: Regular rate. Regular rhythm. No murmurs, rubs, or gallops. Distal pulses are 2+ and symmetric.  Pulmonary/Chest: No respiratory distress. Clear to auscultation bilaterally. No wheezing, rales, or rhonchi.  Abdominal: Soft and non-distended.  RUQ tenderness.  No rebound, guarding, or rigidity. Good bowel sounds.  Musculoskeletal: Moves all extremities. No obvious deformities. No edema. No calf tenderness.  Skin: Warm and dry.  Neurological:  Alert, awake, and appropriate.  Normal speech.  No acute focal neurological deficits are appreciated.  Psychiatric: Normal affect. Good eye contact. Appropriate in content.    ED Course    Procedures  ED Vital Signs:  Vitals:    05/08/17 1001 05/08/17 1014 05/08/17 1234 05/08/17 1235   BP: (!) 195/94 (!) 162/78  (!) 155/62   Pulse: 66 62 67    Resp: (!) 22 16 14    Temp: 97.5 °F (36.4 °C)   98.5 °F (36.9 °C)   TempSrc: Oral   Oral   SpO2: 97% 98% 97% 98%   Weight: 88.5 kg (195 lb)      Height: 5' 10" (1.778 m)          Abnormal Lab Results:  Labs Reviewed   B-TYPE NATRIURETIC PEPTIDE - Abnormal; Notable for the following:        Result Value     (*)     All other components within normal limits   COMPREHENSIVE METABOLIC PANEL - Abnormal; " Notable for the following:     Glucose 40 (*)     Creatinine 1.7 (*)     eGFR if  49 (*)     eGFR if non  42 (*)     All other components within normal limits    Narrative:       Glusose critical result(s) called and verbal readback obtained from   April Katerine, 05/08/2017 12:18   CULTURE, BLOOD    Narrative:     Aerobic and anaerobic   CULTURE, BLOOD    Narrative:     Aerobic and anaerobic   APTT   CBC W/ AUTO DIFFERENTIAL   LACTIC ACID, PLASMA   LIPASE   MAGNESIUM   PHOSPHORUS   PROTIME-INR   TROPONIN I   TSH   TYPE & SCREEN   POCT GLUCOSE        All Lab Results:  Results for orders placed or performed during the hospital encounter of 05/08/17   APTT   Result Value Ref Range    aPTT 23.6 21.0 - 32.0 sec   Brain natriuretic peptide   Result Value Ref Range     (H) 0 - 99 pg/mL   CBC auto differential   Result Value Ref Range    WBC 5.11 3.90 - 12.70 K/uL    RBC 4.64 4.60 - 6.20 M/uL    Hemoglobin 14.4 14.0 - 18.0 g/dL    Hematocrit 44.1 40.0 - 54.0 %    MCV 95 82 - 98 fL    MCH 31.0 27.0 - 31.0 pg    MCHC 32.7 32.0 - 36.0 %    RDW 13.5 11.5 - 14.5 %    Platelets 229 150 - 350 K/uL    MPV 9.9 9.2 - 12.9 fL    Gran # 3.2 1.8 - 7.7 K/uL    Lymph # 1.3 1.0 - 4.8 K/uL    Mono # 0.5 0.3 - 1.0 K/uL    Eos # 0.1 0.0 - 0.5 K/uL    Baso # 0.02 0.00 - 0.20 K/uL    Gran% 62.4 38.0 - 73.0 %    Lymph% 26.0 18.0 - 48.0 %    Mono% 9.2 4.0 - 15.0 %    Eosinophil% 2.0 0.0 - 8.0 %    Basophil% 0.4 0.0 - 1.9 %    Differential Method Automated    Comprehensive metabolic panel   Result Value Ref Range    Sodium 143 136 - 145 mmol/L    Potassium 3.7 3.5 - 5.1 mmol/L    Chloride 105 95 - 110 mmol/L    CO2 27 23 - 29 mmol/L    Glucose 40 (LL) 70 - 110 mg/dL    BUN, Bld 15 8 - 23 mg/dL    Creatinine 1.7 (H) 0.5 - 1.4 mg/dL    Calcium 9.4 8.7 - 10.5 mg/dL    Total Protein 7.1 6.0 - 8.4 g/dL    Albumin 3.6 3.5 - 5.2 g/dL    Total Bilirubin 0.4 0.1 - 1.0 mg/dL    Alkaline Phosphatase 68 55 - 135 U/L     AST 20 10 - 40 U/L    ALT 13 10 - 44 U/L    Anion Gap 11 8 - 16 mmol/L    eGFR if African American 49 (A) >60 mL/min/1.73 m^2    eGFR if non African American 42 (A) >60 mL/min/1.73 m^2   Lactic acid, plasma #1   Result Value Ref Range    Lactate (Lactic Acid) 1.4 0.5 - 2.2 mmol/L   Lipase   Result Value Ref Range    Lipase 20 4 - 60 U/L   Magnesium   Result Value Ref Range    Magnesium 1.9 1.6 - 2.6 mg/dL   Phosphorus   Result Value Ref Range    Phosphorus 2.9 2.7 - 4.5 mg/dL   Protime-INR   Result Value Ref Range    Prothrombin Time 10.5 9.0 - 12.5 sec    INR 1.0 0.8 - 1.2   Troponin I   Result Value Ref Range    Troponin I 0.019 0.000 - 0.026 ng/mL   TSH   Result Value Ref Range    TSH 1.329 0.400 - 4.000 uIU/mL   Type & Screen   Result Value Ref Range    Group & Rh O POS     Indirect Lesly NEG    POCT glucose   Result Value Ref Range    POCT Glucose 101 70 - 110 mg/dL         Imaging Results:  Imaging Results         X-Ray Chest AP Portable (Final result) Result time:  05/08/17 11:11:51    Final result by Porter Armas MD (05/08/17 11:11:51)    Impression:     No acute findings      Electronically signed by: PORTER ARMAS MD  Date:     05/08/17  Time:    11:11     Narrative:    History: Fever, sepsis    Normal heart size. No infiltrates. Linear scar left base.            The EKG was ordered, reviewed, and independently interpreted by the ED provider.  Interpretation time: 1008  Rate: 63 BPM  Rhythm: normal sinus rhythm  Interpretation:  No STEMI.          The Emergency Provider reviewed the vital signs and test results, which are outlined above.            ED Discussion     ED Discussions:    12:42 PM: Reassessed pt at this time.  Pt states his SOB has resolved at this time secondary to having apple juice and crackers. Suspect that pt's sx are related to periodic hypoglycemia. Discussed with pt all pertinent ED information and results. Discussed pt dx and plan of tx. Gave pt renal f/u instruction and  return to the ED instructions. All questions and concerns were addressed at this time. Pt expresses understanding of information and instructions, and is comfortable with plan to discharge. Pt is stable for discharge.    I discussed with patient and/or family/caretaker that evaluation in the ED does not suggest any emergent or life threatening medical conditions requiring immediate intervention beyond what was provided in the ED, and I believe patient is safe for discharge.  Regardless, an unremarkable evaluation in the ED does not preclude the development or presence of a serious of life threatening condition. As such, patient was instructed to return immediately for any worsening or change in current symptoms.      ED Medication(s):  Medications   sodium chloride 0.9% bolus 2,655 mL (0 mL/kg × 88.5 kg Intravenous Stopped 5/8/17 1307)       Follow-up Information     Follow up with Rishabh Esteban MD In 1 day.    Specialty:  Family Medicine    Contact information:    [address]  [address]  [facility] [address]  [phone]          Follow up with [facility] - .    Specialty:  Emergency Medicine    Why:  If symptoms worsen    Contact information:    [address]  [facility] [id number] [phone]            Medical Decision Making    Medical Decision Making:   Clinical Tests:   Lab Tests: Ordered and Reviewed  Radiological Study: Ordered and Reviewed  Medical Tests: Ordered and Reviewed           Scribe Attestation:   Scribe #1: I performed the above scribed service and the documentation accurately describes the services I performed. I attest to the accuracy of the note.    Attending:   Physician Attestation Statement for Scribe #1: I, Dinesh Garvin MD, personally performed the services described in this documentation, as scribed by Edilma Fierro, in my presence, and it is both accurate and complete.          Clinical Impression       ICD-10-CM ICD-9-CM   1. Shortness of  breath R06.02 786.05   2. Hypoglycemia E16.2 251.2   3.  donor kidney transplant for DM 16 Z94.0 V42.0   4. Long-term use of immunosuppressant medication Z79.899 V58.69       Disposition:   Disposition: Discharged  Condition: Stable         Dinesh Garvin MD  [phone]

## 2017-05-08 NOTE — ED NOTES
Patient placed on continuous cardiac monitor, automatic blood pressure cuff and continuous pulse oximeter. NSR, pulse: 69

## 2017-05-08 NOTE — ED NOTES
"Pt stated he felt "more like himself" at discharge. Educated pt on ensuring adequate meals are consumed when ill. Pt stated understanding and gave examples of adequate meals to consume and to consume sufficient carbohydrates.   "

## 2017-05-08 NOTE — DISCHARGE INSTRUCTIONS

## 2017-05-08 NOTE — ED AVS SNAPSHOT
[facility]  [id number] [place] [id number]               Lavelle Ladd   2017 10:03 AM   ED    Description:  Male : 1955   Department:  [facility]           Your Care was Coordinated By:     Provider Role From To    Dinesh Garvin MD Attending Provider 17 1015 --      Reason for Visit     Shortness of Breath           Diagnoses this Visit        Comments    Shortness of breath    -  Primary     Hypoglycemia         S/P kidney transplant         Long-term use of immunosuppressant medication           ED Disposition     None           To Do List           Follow-up Information     Follow up with Rishabh Esteban MD In 1 day.    Specialty:  Family Medicine    Contact information:     [facility]  [address]  [facility] [address]  [phone]          Follow up with [facility].    Specialty:  Emergency Medicine    Why:  If symptoms worsen    Contact information:    [address] [place] [id number] [phone]      [facility]     [facility] -  Assistance  Unless otherwise directed by your provider, please contact [facility], our nurse care line that is available for  assistance.     Registered nurses in the [facility] provide: appointment scheduling, clinical advisement, health education, and other advisory services.  Call: [phone] (toll free)               Medications           Message regarding Medications     Verify the changes and/or additions to your medication regime listed below are the same as discussed with your clinician today.  If any of these changes or additions are incorrect, please notify your healthcare provider.        These medications were administered today        Dose Freq    sodium chloride 0.9% bolus 2,655 mL 30 mL/kg × 88.5 kg ED 1 Time    Sig: Inject 2,655 mLs into the vein ED 1 Time.    Class: Normal     "Route: Intravenous           Verify that the below list of medications is an accurate representation of the medications you are currently taking.  If none reported, the list may be blank. If incorrect, please contact your healthcare provider. Carry this list with you in case of emergency.           Current Medications     albuterol (VENTOLIN HFA) 90 mcg/actuation inhaler Inhale 2 puffs into the lungs every 4 (four) hours as needed for Wheezing.    albuterol-ipratropium 2.5mg-0.5mg/3mL (DUO-NEB) 0.5 mg-3 mg(2.5 mg base)/3 mL nebulizer solution Take 3 mLs by nebulization every 6 (six) hours.    amoxicillin-clavulanate 500-125mg (AUGMENTIN) 500-125 mg Tab Take 1 tablet (500 mg total) by mouth 2 (two) times daily.    aspirin (ECOTRIN) 81 MG EC tablet Take 1 tablet (81 mg total) by mouth once daily.    atovaquone (MEPRON) 750 mg/5 mL Susp Take 10 mLs (1,500 mg total) by mouth once daily.    azithromycin (Z-KOKI) 250 MG tablet Take 1 tablet (250 mg total) by mouth once daily. 1 every day until finished.    BD INSULIN PEN NEEDLE UF SHORT 31 gauge x 5/16" Ndle     BD INSULIN SYRINGE ULTRA-FINE 1 mL 31 gauge x 5/16 Syrg     blood sugar diagnostic Strp 1 each by Misc.(Non-Drug; Combo Route) route 3 (three) times daily.    blood-glucose meter kit Use as instructed    budesonide-formoterol 160-4.5 mcg (SYMBICORT) 160-4.5 mcg/actuation HFAA Inhale 2 puffs into the lungs every 12 (twelve) hours. Wash out mouth after using    ergocalciferol (ERGOCALCIFEROL) 50,000 unit Cap Take 1 capsule (50,000 Units total) by mouth every 7 days. Take on Mondays    ERTAPENEM SODIUM (ERTAPENEM, INVANZ, 1 G/100 ML NS, READY TO MIX,) Inject 100 mLs (1 g total) into the vein once daily.    famotidine (PEPCID) 20 MG tablet Take 1 tablet (20 mg total) by mouth every evening.    inhalation device (BREATHERITE VALVED MDI CHAMBER) Use as directed for inhalation.    insulin NPH (NOVOLIN N) 100 unit/mL injection Take 25 units subq with breakfast and 15 units " "at bedtime    insulin regular (NOVOLIN R) 100 unit/mL Inj injection Inject 6 units with breakfast and 8 units with dinner, subcutaneously 30 min before meal.    insulin syringe,safetyneedle 1 mL 31 gauge x 5/16" Syrg 1 Syringe by Misc.(Non-Drug; Combo Route) route 4 (four) times daily with meals and nightly.    lancets Misc 1 each by Misc.(Non-Drug; Combo Route) route 3 (three) times daily.    ledipasvir-sofosbuvir  mg Tab Take by mouth once daily.    magnesium oxide (MAG-OX) 400 mg tablet Take 1 tablet (400 mg total) by mouth 2 (two) times daily.    metoprolol tartrate (LOPRESSOR) 50 MG tablet Take 0.5 tablets (25 mg total) by mouth 2 (two) times daily.    multivitamin (THERAGRAN) tablet Take 1 tablet by mouth once daily.    mycophenolate (CELLCEPT) 250 mg Cap Take 2 capsules (500 mg total) by mouth 2 (two) times daily. Z94.0 Kidney Transplant 5/21/2016.    olanzapine (ZYPREXA) 5 MG tablet Take 1 tablet (5 mg total) by mouth every evening.    ondansetron (ZOFRAN-ODT) 4 MG TbDL Take 2 tablets (8 mg total) by mouth every 8 (eight) hours as needed (nausea).    oxycodone (ROXICODONE) 10 mg Tab immediate release tablet     oxycodone (ROXICODONE) 15 MG Tab Take 1 tablet (15 mg total) by mouth every 6 (six) hours as needed for Pain.    pen needle, diabetic (EASY COMFORT PEN NEEDLES) 32 gauge x 5/32" Ndle Inject 1 each into the skin 3 (three) times daily.    pen needle, diabetic 31 gauge x 1/4" Ndle 1 each by Misc.(Non-Drug; Combo Route) route 4 (four) times daily.    predniSONE (DELTASONE) 10 MG tablet Take 10 mg by mouth once daily.    sodium bicarbonate 650 MG tablet Take 4 tablets BID  four hours before or after Harvoni    tacrolimus (PROGRAF) 1 MG Cap Take 3mg in the AM and 2mg in the PM by mouth. Z94.0 Kidney Transplant    tramadol (ULTRAM-ER) 100 MG Tb24 Take 100 mg by mouth as needed.           Clinical Reference Information           Your Vitals Were     BP Pulse Temp Resp Height Weight    155/62 " "(BP Location: Right arm, Patient Position: Lying, BP Method: Automatic) 67 98.5 °F (36.9 °C) (Oral) 14 5' 10" (1.778 m) 88.5 kg (195 lb)    SpO2 BMI             98% 27.98 kg/m2         Allergies as of 5/8/2017        Reactions    Tylenol [Acetaminophen]     Told not to take per Transplant Team      Immunizations Administered on Date of Encounter - 5/8/2017     None      ED Micro, Lab, POCT     Start Ordered       Status Ordering Provider    05/08/17 1847 05/08/17 1047  Lactic acid, plasma #3  In 8 hours      Acknowledged     05/08/17 1447 05/08/17 1047  Lactic acid, plasma #2  In 4 hours      Acknowledged     05/08/17 1300 05/08/17 1300  POCT glucose  Once      Final result     05/08/17 1243 05/08/17 1242  POCT glucose  Once      Ordered     05/08/17 1047 05/08/17 1047  APTT  Once      Final result     05/08/17 1047 05/08/17 1047  Blood culture x two cultures. Draw prior to antibiotics.  Every 15 min     Comments:  Aerobic and anaerobic   Start Status   05/08/17 1047 In process   05/08/17 1102 In process       Acknowledged     05/08/17 1047 05/08/17 1047  Brain natriuretic peptide  STAT      Final result     05/08/17 1047 05/08/17 1047  CBC auto differential  STAT      Final result     05/08/17 1047 05/08/17 1047  Comprehensive metabolic panel  STAT      Final result     05/08/17 1047 05/08/17 1047  Lactic acid, plasma #1  STAT      Final result     05/08/17 1047 05/08/17 1047  Lipase  Once      Final result     05/08/17 1047 05/08/17 1047  Magnesium  STAT      Final result     05/08/17 1047 05/08/17 1047  Phosphorus  STAT      Final result     05/08/17 1047 05/08/17 1047  Protime-INR  STAT      Final result     05/08/17 1047 05/08/17 1047  Troponin I  STAT      Final result     05/08/17 1047 05/08/17 1047  TSH  Once      Final result     05/08/17 1047 05/08/17 1047  Urinalysis  STAT      Acknowledged     05/08/17 1047 05/08/17 1047  Urine culture  STAT      Acknowledged       ED Imaging Orders     Start Ordered    "    Status Ordering Provider    05/08/17 1047 05/08/17 1047  X-Ray Chest AP Portable  1 time imaging      Final result         Discharge Instructions         Shortness of Breath (Dyspnea)  Shortness of breath is the feeling that you can't catch your breath or get enough air. It is also known as dyspnea.  Dyspnea can be caused by many different conditions. They include:  · Acute asthma attack.  · Worsening of chronic lung diseases such as chronic bronchitis and emphysema.  · Heart failure. This is when weak heart muscle allows extra fluid to collect in the lungs.  · Panic attacks or anxiety. Fear can cause rapid breathing (hyperventilation).  · Pneumonia, or an infection in the lung tissue.  · Exposure to toxic substances, fumes, smoke, or certain medicines.  · Blood clot in the lung (pulmonary embolism). This is often from a piece of blood clot in a deep vein of the leg (deep vein thrombosis) that breaks off and travels to the lungs.  · Heart attack or heart-related chest pain (angina).  · Anemia.  · Collapsed lung (pneumothorax).  · Dehydration.  · Pregnancy.  Based on your visit today, the exact cause of your shortness of breath is not certain. Your tests dont show any of the serious causes of dyspnea. You may need other tests to find out if you have a serious problem. Its important to watch for any new symptoms or symptoms that get worse. Follow up with your healthcare provider as directed.  Home care  Follow these tips to take care of yourself at home:  · When your symptoms are better, go back to your usual activities.  · If you smoke, you should stop. Join a quit-smoking program or ask your healthcare provider for help.  · Eat a healthy diet and get plenty of sleep.  · Get regular exercise. Talk with your healthcare provider before starting to exercise, especially if you have other medical problems.  · Cut down on the amount of caffeine and stimulants you consume.  Follow-up care  Follow up with your  healthcare provider, or as advised.  If tests were done, you will be told if your treatment needs to be changed. You can call as directed for the results.  (Note: If an X-ray was taken, a specialist will review it. You will be notified of any new findings that may affect your care.)  Call 911 or get immediate medical care  Shortness of breath may be a sign of a serious medical problem. For example, it may be a problem with your heart or lungs. Call 911 if you have worsening shortness of breath or trouble breathing, especially with any of the symptoms below:  · You are confused or its difficult to wake you.  · You faint or lose consciousness.  · You have a fast heartbeat, or your heartbeat is irregular.  · You are coughing up blood.  · You have pain in your chest, arm, shoulder, neck, or upper back.  · You break out in a sweat.  When to seek medical advice  Call your healthcare provider right away if any of these occur:  · Slight shortness of breath or wheezing  · Redness, pain or swelling in your leg, arm, or other body area  · Swelling in both legs or ankles  · Fast weight gain  · Dizziness or weakness  · Fever of 100.4ºF (38ºC) or higher, or as directed by your healthcare provider  Date Last Reviewed: 9/13/2015 © 2000-2016 [email or web address]. [address] [place] [address]. All rights reserved. This information is not intended as a substitute for professional medical care. Always follow your healthcare professional's instructions.          Discharge References/Attachments     BLOOD SUGAR, LOW; HYPOGLYCEMIA (ENGLISH)      Your Scheduled Appointments     May 10, 2017  4:00 PM CDT   Diabetes Ed Follow-Up with Dana Morillo RD, CDE   [facility] ([facility])    [address] [place] [address]   [phone]            Jun 13, 2017 11:30 AM CDT   Established Patient Visit with Amira Lynn Jr., PAGladisC   [facility] ([facility])    [address]  [place] [address]   [phone]            Jul 05, 2017 11:05 AM CDT   Non-Fasting Lab with LABORATORY, [facility] ([facility]    [address] [place] [address]   [phone]            Sep 06, 2017  9:20 AM CDT   Complete PFT with PULMONARY LAB, [facility] ([facility])    [address] [place] [address]   [phone]            Sep 06, 2017 10:00 AM CDT   Established Patient Visit with Colin Garcia MD   [facility] ([facility])    [address] [place] [address]   [phone]              Smoking Cessation     If you would like to quit smoking:   You may be eligible for free services if you are a [place] resident and started smoking cigarettes before September 1, 1988.  Call the Smoking Cessation Trust ([facility]) toll free at (752) [phone] or ([phone]) 739-9551.   Call 1-800-QUIT-NOW if you do not meet the above criteria.   Contact us via email: [email or web address]   View our website for more information: [email or web address]         [facility]  complies with applicable Federal civil rights laws and does not discriminate on the basis of race, color, national origin, age, disability, or sex.        Language Assistance Services     ATTENTION: Language assistance services are available, free of charge. Please call [phone].      ATENCIÓN: Si habla español, tiene a hollis disposición servicios gratuitos de asistencia lingüística. Llame al [phone].     CHÚ Ý: N?u b?n nói Ti?ng Vi?t, có các d?ch v? h? tr? ngôn ng? mi?n phí dành cho b?n. G?i s? [phone].

## 2017-05-09 LAB
BACTERIA BLD CULT: NORMAL
BACTERIA BLD CULT: NORMAL

## 2017-05-13 LAB
BACTERIA BLD CULT: NORMAL
BACTERIA BLD CULT: NORMAL

## 2017-06-15 ENCOUNTER — HOSPITAL ENCOUNTER (EMERGENCY)
Facility: HOSPITAL | Age: 62
Discharge: HOME | End: 2017-06-15
Attending: EMERGENCY MEDICINE
Payer: MEDICARE

## 2017-06-15 VITALS
HEART RATE: 58 BPM | WEIGHT: 190 LBS | DIASTOLIC BLOOD PRESSURE: 77 MMHG | TEMPERATURE: 98 F | OXYGEN SATURATION: 100 % | SYSTOLIC BLOOD PRESSURE: 145 MMHG | RESPIRATION RATE: 14 BRPM | HEIGHT: 70 IN | BODY MASS INDEX: 27.2 KG/M2

## 2017-06-15 DIAGNOSIS — N18.9 CHRONIC KIDNEY DISEASE, UNSPECIFIED STAGE: ICD-10-CM

## 2017-06-15 DIAGNOSIS — R11.2 NAUSEA AND VOMITING, INTRACTABILITY OF VOMITING NOT SPECIFIED, UNSPECIFIED VOMITING TYPE: Primary | ICD-10-CM

## 2017-06-15 LAB
ABO + RH BLD: NORMAL
ALBUMIN SERPL BCP-MCNC: 3.6 G/DL
ALP SERPL-CCNC: 74 U/L
ALT SERPL W/O P-5'-P-CCNC: 16 U/L
ANION GAP SERPL CALC-SCNC: 11 MMOL/L
APTT BLDCRRT: 25 SEC
AST SERPL-CCNC: 21 U/L
BASOPHILS # BLD AUTO: 0.02 K/UL
BASOPHILS NFR BLD: 0.5 %
BILIRUB SERPL-MCNC: 0.7 MG/DL
BLD GP AB SCN CELLS X3 SERPL QL: NORMAL
BUN SERPL-MCNC: 27 MG/DL
CALCIUM SERPL-MCNC: 9.6 MG/DL
CHLORIDE SERPL-SCNC: 101 MMOL/L
CO2 SERPL-SCNC: 24 MMOL/L
CREAT SERPL-MCNC: 1.7 MG/DL
DIFFERENTIAL METHOD: NORMAL
EOSINOPHIL # BLD AUTO: 0.1 K/UL
EOSINOPHIL NFR BLD: 1.9 %
ERYTHROCYTE [DISTWIDTH] IN BLOOD BY AUTOMATED COUNT: 13 %
EST. GFR  (AFRICAN AMERICAN): 49 ML/MIN/1.73 M^2
EST. GFR  (NON AFRICAN AMERICAN): 42 ML/MIN/1.73 M^2
GLUCOSE SERPL-MCNC: 126 MG/DL
HCT VFR BLD AUTO: 44.3 %
HGB BLD-MCNC: 15.2 G/DL
INR PPP: 1
LIPASE SERPL-CCNC: 13 U/L
LYMPHOCYTES # BLD AUTO: 1.2 K/UL
LYMPHOCYTES NFR BLD: 27.5 %
MAGNESIUM SERPL-MCNC: 2.1 MG/DL
MCH RBC QN AUTO: 30.5 PG
MCHC RBC AUTO-ENTMCNC: 34.3 %
MCV RBC AUTO: 89 FL
MONOCYTES # BLD AUTO: 0.5 K/UL
MONOCYTES NFR BLD: 12.5 %
NEUTROPHILS # BLD AUTO: 2.5 K/UL
NEUTROPHILS NFR BLD: 57.6 %
PLATELET # BLD AUTO: 200 K/UL
PMV BLD AUTO: 10.5 FL
POTASSIUM SERPL-SCNC: 4.4 MMOL/L
PROT SERPL-MCNC: 7.2 G/DL
PROTHROMBIN TIME: 10.7 SEC
RBC # BLD AUTO: 4.98 M/UL
SODIUM SERPL-SCNC: 136 MMOL/L
WBC # BLD AUTO: 4.25 K/UL

## 2017-06-15 PROCEDURE — 93010 ELECTROCARDIOGRAM REPORT: CPT | Mod: ,,, | Performed by: INTERNAL MEDICINE

## 2017-06-15 PROCEDURE — 99284 EMERGENCY DEPT VISIT MOD MDM: CPT | Mod: 25

## 2017-06-15 PROCEDURE — 96374 THER/PROPH/DIAG INJ IV PUSH: CPT

## 2017-06-15 PROCEDURE — 85025 COMPLETE CBC W/AUTO DIFF WBC: CPT

## 2017-06-15 PROCEDURE — 96361 HYDRATE IV INFUSION ADD-ON: CPT

## 2017-06-15 PROCEDURE — 83690 ASSAY OF LIPASE: CPT

## 2017-06-15 PROCEDURE — 85610 PROTHROMBIN TIME: CPT

## 2017-06-15 PROCEDURE — 85730 THROMBOPLASTIN TIME PARTIAL: CPT

## 2017-06-15 PROCEDURE — 80053 COMPREHEN METABOLIC PANEL: CPT

## 2017-06-15 PROCEDURE — 25000003 PHARM REV CODE 250: Performed by: EMERGENCY MEDICINE

## 2017-06-15 PROCEDURE — 36415 COLL VENOUS BLD VENIPUNCTURE: CPT

## 2017-06-15 PROCEDURE — 86901 BLOOD TYPING SEROLOGIC RH(D): CPT

## 2017-06-15 PROCEDURE — 83735 ASSAY OF MAGNESIUM: CPT

## 2017-06-15 PROCEDURE — 63600175 PHARM REV CODE 636 W HCPCS: Performed by: EMERGENCY MEDICINE

## 2017-06-15 PROCEDURE — 86900 BLOOD TYPING SEROLOGIC ABO: CPT

## 2017-06-15 RX ORDER — ONDANSETRON 4 MG/1
4 TABLET, ORALLY DISINTEGRATING ORAL EVERY 8 HOURS PRN
Qty: 21 TABLET | Refills: 1 | Status: SHIPPED | OUTPATIENT
Start: 2017-06-15 | End: 2022-01-01 | Stop reason: SDUPTHER

## 2017-06-15 RX ORDER — ONDANSETRON 2 MG/ML
4 INJECTION INTRAMUSCULAR; INTRAVENOUS ONCE
Status: COMPLETED | OUTPATIENT
Start: 2017-06-15 | End: 2017-06-15

## 2017-06-15 RX ADMIN — ONDANSETRON 4 MG: 2 INJECTION, SOLUTION INTRAMUSCULAR; INTRAVENOUS at 07:06

## 2017-06-15 RX ADMIN — SODIUM CHLORIDE 1000 ML: 0.9 INJECTION, SOLUTION INTRAVENOUS at 07:06

## 2017-06-15 NOTE — ED PROVIDER NOTES
"SCRIBE #1 NOTE: I, Vipin Zhou, am scribing for, and in the presence of, Viviana Mosquera DO. I have scribed the entire note.      History      Chief Complaint   Patient presents with    Nausea     PT states, "I have been nauseated for 2 days now, I am a recent Kidney transplant patient".       Review of patient's allergies indicates:   Allergen Reactions    Tylenol [acetaminophen]      Told not to take per Transplant Team        HPI   HPI    6/15/2017, 7:13 AM   History obtained from the patient      History of Present Illness: Lavelle Ladd is a 61 y.o. male patient with a PMHx of kidney transplant in , who presents to the Emergency Department for emesis which onset gradually yesterday. Sxs are episodic (10 episodes) and moderate in severity. Pt reports seeing some red color in emesis and thinks it may be blood. There are no mitigating or exacerbating factors noted. Associated sxs include nausea.  Pt denies any fever, diarrhea, blood in stool, chills, HA, dysuria, dizziness, lightheadedness, abd pain, and all other sxs at this time. Pt states he ran out of Zofran a few months ago. Pt takes he takes oxycodone and morphine as needed for pain. No further complaints or concerns at this time.       Arrival mode: Personal vehicle     PCP: Rishabh Esteban MD       Past Medical History:  Past Medical History:   Diagnosis Date    DINORAH (acute kidney injury) 2016    Arthritis     CHF (congestive heart failure)     Chronic obstructive pulmonary disease 2016    Coronary artery disease involving native coronary artery of native heart without angina pectoris 2016     donor kidney transplant for DM 16     Induction with Thymo x3 and IV solumedrol to total 875mg  Kidney Biopsy  2016: 16 glomeruli, ACR type 1 AVR type 2, significant microcirculatory changes, c4d negative, No DSA, 5 to10% fibrosis. Treated with thymo x8 2016- no rejection      Diastolic heart failure  "    DM2 (diabetes mellitus, type 2)     Encounter for blood transfusion     ESRD on RRT since 10/2013 10/29/2013    Biopsy proven diabetic nephropathy and lymphoplasmacytic interstitial infiltrate not c/w with AIN (ddx sjogrens or assoc with tamm-horsefall protein extravasation)     GERD (gastroesophageal reflux disease)     History of hepatitis C, s/p successful Rx w/ SVR12 - 4/2017 4/5/2017    Completed 12 weeks harvoni w/ SVR    Hyperlipidemia     Hypertension     Prophylactic immunotherapy     Proteinuria     Reactive airway disease     Renal hypertension     Type 2 diabetes mellitus with diabetic neuropathy, with long-term current use of insulin 12/1/2016    Type 2 diabetes mellitus with hyperglycemia, with long-term current use of insulin     Type 2 diabetes mellitus with retinopathy, with long-term current use of insulin 12/1/2016    Vitamin B12 deficiency        Past Surgical History:  Past Surgical History:   Procedure Laterality Date    av bovine graft      Left UE    AV FISTULA PLACEMENT      left UE    CARDIAC CATHETERIZATION  02/2015    KIDNEY TRANSPLANT  05/21/2016    LEG AMPUTATION THROUGH KNEE  2011    right LE, started as nail puncture leading to diabetic ulcer         Family History:  Family History   Problem Relation Age of Onset    Cancer Father     Diabetes Father     Heart failure Father     Stroke Father     Heart failure Mother     Kidney disease Neg Hx        Social History:  Social History     Social History Main Topics    Smoking status: Former Smoker     Packs/day: 1.00     Years: 40.00     Quit date: 1/11/2013    Smokeless tobacco: Never Used    Alcohol use No    Drug use: No    Sexual activity: No       ROS   Review of Systems   Constitutional: Negative for fever.   HENT: Negative for sore throat.    Respiratory: Negative for shortness of breath.    Cardiovascular: Negative for chest pain.   Gastrointestinal: Positive for nausea and vomiting. Negative for  "abdominal pain, blood in stool, constipation and diarrhea.   Genitourinary: Negative for dysuria.   Musculoskeletal: Negative for back pain.   Skin: Negative for rash.   Neurological: Negative for dizziness, syncope, weakness, numbness and headaches.   Hematological: Does not bruise/bleed easily.       Physical Exam      Initial Vitals [06/15/17 0711]   BP Pulse Resp Temp SpO2   120/65 62 20 97.4 °F (36.3 °C) 98 %      Physical Exam  Nursing Notes and Vital Signs Reviewed.  Constitutional: Patient is in no acute distress. Well-developed and well-nourished.  Head: Atraumatic. Normocephalic.  Eyes: PERRL. EOM intact. Conjunctivae are not pale. No scleral icterus.  ENT: Mucous membranes are moist. Oropharynx is clear and symmetric.    Neck: Supple. Full ROM. No lymphadenopathy.  Cardiovascular: Regular rate. Regular rhythm. No murmurs, rubs, or gallops. Distal pulses are 2+ and symmetric.  Pulmonary/Chest: No respiratory distress. Clear to auscultation bilaterally. No wheezing, rales, or rhonchi.  Abdominal: Soft and non-distended.  There is no tenderness.  No rebound, guarding, or rigidity. Good bowel sounds.  Genitourinary: No CVA tenderness  Musculoskeletal: Moves all extremities. Right BKA. No edema. No calf tenderness.  Skin: Warm and dry.  Neurological:  Alert, awake, and appropriate.  Normal speech.  No acute focal neurological deficits are appreciated.  Psychiatric: Normal affect. Good eye contact. Appropriate in content.    ED Course    Procedures  ED Vital Signs:  Vitals:    06/15/17 0711 06/15/17 0723   BP: 120/65    Pulse: 62 61   Resp: 20    Temp: 97.4 °F (36.3 °C)    TempSrc: Oral    SpO2: 98%    Weight: 86.2 kg (190 lb)    Height: 5' 10" (1.778 m)        Abnormal Lab Results:  Labs Reviewed   COMPREHENSIVE METABOLIC PANEL - Abnormal; Notable for the following:        Result Value    Glucose 126 (*)     BUN, Bld 27 (*)     Creatinine 1.7 (*)     eGFR if  49 (*)     eGFR if non  " American 42 (*)     All other components within normal limits   CBC W/ AUTO DIFFERENTIAL   MAGNESIUM   LIPASE   PROTIME-INR   APTT   PROTIME-INR   APTT   URINALYSIS   TYPE & SCREEN        All Lab Results:  Results for orders placed or performed during the hospital encounter of 06/15/17   CBC auto differential   Result Value Ref Range    WBC 4.25 3.90 - 12.70 K/uL    RBC 4.98 4.60 - 6.20 M/uL    Hemoglobin 15.2 14.0 - 18.0 g/dL    Hematocrit 44.3 40.0 - 54.0 %    MCV 89 82 - 98 fL    MCH 30.5 27.0 - 31.0 pg    MCHC 34.3 32.0 - 36.0 %    RDW 13.0 11.5 - 14.5 %    Platelets 200 150 - 350 K/uL    MPV 10.5 9.2 - 12.9 fL    Gran # 2.5 1.8 - 7.7 K/uL    Lymph # 1.2 1.0 - 4.8 K/uL    Mono # 0.5 0.3 - 1.0 K/uL    Eos # 0.1 0.0 - 0.5 K/uL    Baso # 0.02 0.00 - 0.20 K/uL    Gran% 57.6 38.0 - 73.0 %    Lymph% 27.5 18.0 - 48.0 %    Mono% 12.5 4.0 - 15.0 %    Eosinophil% 1.9 0.0 - 8.0 %    Basophil% 0.5 0.0 - 1.9 %    Differential Method Automated    Comprehensive metabolic panel   Result Value Ref Range    Sodium 136 136 - 145 mmol/L    Potassium 4.4 3.5 - 5.1 mmol/L    Chloride 101 95 - 110 mmol/L    CO2 24 23 - 29 mmol/L    Glucose 126 (H) 70 - 110 mg/dL    BUN, Bld 27 (H) 8 - 23 mg/dL    Creatinine 1.7 (H) 0.5 - 1.4 mg/dL    Calcium 9.6 8.7 - 10.5 mg/dL    Total Protein 7.2 6.0 - 8.4 g/dL    Albumin 3.6 3.5 - 5.2 g/dL    Total Bilirubin 0.7 0.1 - 1.0 mg/dL    Alkaline Phosphatase 74 55 - 135 U/L    AST 21 10 - 40 U/L    ALT 16 10 - 44 U/L    Anion Gap 11 8 - 16 mmol/L    eGFR if African American 49 (A) >60 mL/min/1.73 m^2    eGFR if non African American 42 (A) >60 mL/min/1.73 m^2   Magnesium   Result Value Ref Range    Magnesium 2.1 1.6 - 2.6 mg/dL   Lipase   Result Value Ref Range    Lipase 13 4 - 60 U/L   Protime-INR   Result Value Ref Range    Prothrombin Time 10.7 9.0 - 12.5 sec    INR 1.0 0.8 - 1.2   APTT   Result Value Ref Range    aPTT 25.0 21.0 - 32.0 sec   Type & Screen   Result Value Ref Range    Group & Rh O POS      Indirect Lesly NEG          Imaging Results:  Imaging Results          X-Ray Chest 1 View (Final result)  Result time 06/15/17 08:10:54    Final result by Timoteo Lang MD (06/15/17 08:10:54)                 Impression:        Stable negative chest x-ray.      Electronically signed by: TIMOTEO LANG MD  Date:     06/15/17  Time:    08:10              Narrative:    Procedure: XR CHEST 1 VIEW, 06/15/17 07:48:56    Clinical indication:  Chest pain    Findings: Comparison with 05/08/2017.  The heart size is normal.  Right paratracheal soft tissue density is unchanged.  Previous CT revealed paratracheal fat.    The lung fields remain clear.                             X-Ray Abdomen Flat And Erect (Final result)  Result time 06/15/17 08:15:15    Final result by Timoteo Lang MD (06/15/17 08:15:15)                 Impression:     Nonobstructed bowel gas pattern.      Electronically signed by: TIMOTEO LANG MD  Date:     06/15/17  Time:    08:15              Narrative:    Procedure: XR ABDOMEN FLAT AND ERECT, 06/15/17 07:48:49    History:  vomiting    Two views of the abdomen. Comparison with 02/24/2015.    The lung bases appear clear.    Bowel gas pattern is unremarkable.    Vascular calcification is present.  Lumbar degenerative disc disease.                             Results for AISHA CURRIE (MRN [id number]) as of 6/15/2017 09:32   Ref. Range 5/5/2017 07:48 5/5/2017 07:48 5/8/2017 11:15 6/15/2017 07:44   BUN, Bld Latest Ref Range: 8 - 23 mg/dL 23  15 27 (H)   Creatinine Latest Ref Range: 0.5 - 1.4 mg/dL 1.8 (H)  1.7 (H) 1.7 (H)          The EKG was ordered, reviewed, and independently interpreted by the ED provider.  Interpretation time: 7:53  Rate: 60 BPM  Rhythm: normal sinus rhythm  Interpretation: No acute ST changes. No STEMI.      The Emergency Provider reviewed the vital signs and test results, which are outlined above.    ED Discussion     9:39 AM: Reassessed pt. Pt states their  condition has improved at this time.  Discussed with pt all pertinent ED information and results. Discussed plan of treatment with pt. Gave pt all f/u and return to the ED instructions. All questions and concerns were addressed at this time. Pt understands and agrees to plan as discussed. Pt is stable for discharge.       ED Medication(s):  Medications   sodium chloride 0.9% bolus 1,000 mL (1,000 mLs Intravenous New Bag 6/15/17 0744)   ondansetron injection 4 mg (4 mg Intravenous Given 6/15/17 0730)       New Prescriptions    ONDANSETRON (ZOFRAN-ODT) 4 MG TBDL    Take 1 tablet (4 mg total) by mouth every 8 (eight) hours as needed.       Follow-up Information     [facility]. Schedule an appointment as soon as possible for a visit in 2 days.    Specialty:  Nephrology  Why:  Return to the ED for:  nausea, vomiting, fever, abdominal pain or other concerns.  Contact information:  [address] [place] [address] [phone]  Additional information:  (off [facility]) 2nd floor                   Medical Decision Making    Medical Decision Making:   Clinical Tests:   Lab Tests: Reviewed and Ordered  Radiological Study: Reviewed and Ordered  Medical Tests: Reviewed and Ordered           Scribe Attestation:   Scribe #1: I performed the above scribed service and the documentation accurately describes the services I performed. I attest to the accuracy of the note.    Attending:   Physician Attestation Statement for Scribe #1: I, Viviana Mosquera, DO, personally performed the services described in this documentation, as scribed by Vipin Zhou, in my presence, and it is both accurate and complete.          Clinical Impression       ICD-10-CM ICD-9-CM   1. Nausea and vomiting, intractability of vomiting not specified, unspecified vomiting type R11.2 787.01   2. Chronic kidney disease, unspecified stage N18.9 585.9       Disposition:   Disposition: Discharged  Condition: Stable          Viviana Mosquera,   06/15/17 1706

## 2017-06-22 DIAGNOSIS — Z94.0 KIDNEY REPLACED BY TRANSPLANT: Primary | ICD-10-CM

## 2017-06-26 ENCOUNTER — LAB VISIT (OUTPATIENT)
Dept: LAB | Facility: HOSPITAL | Age: 62
End: 2017-06-26
Attending: INTERNAL MEDICINE
Payer: MEDICARE

## 2017-06-26 DIAGNOSIS — Z94.0 KIDNEY REPLACED BY TRANSPLANT: ICD-10-CM

## 2017-06-26 PROBLEM — I73.9 PVD (PERIPHERAL VASCULAR DISEASE): Chronic | Status: ACTIVE | Noted: 2017-06-26

## 2017-06-26 LAB
ALBUMIN SERPL BCP-MCNC: 3.4 G/DL
ANION GAP SERPL CALC-SCNC: 9 MMOL/L
BASOPHILS # BLD AUTO: 0.01 K/UL
BASOPHILS NFR BLD: 0.2 %
BUN SERPL-MCNC: 35 MG/DL
CALCIUM SERPL-MCNC: 9.3 MG/DL
CHLORIDE SERPL-SCNC: 104 MMOL/L
CO2 SERPL-SCNC: 24 MMOL/L
CREAT SERPL-MCNC: 2.1 MG/DL
DIFFERENTIAL METHOD: NORMAL
EOSINOPHIL # BLD AUTO: 0.1 K/UL
EOSINOPHIL NFR BLD: 1.7 %
ERYTHROCYTE [DISTWIDTH] IN BLOOD BY AUTOMATED COUNT: 13.7 %
EST. GFR  (AFRICAN AMERICAN): 37.6 ML/MIN/1.73 M^2
EST. GFR  (NON AFRICAN AMERICAN): 32.5 ML/MIN/1.73 M^2
GLUCOSE SERPL-MCNC: 145 MG/DL
HCT VFR BLD AUTO: 43.2 %
HGB BLD-MCNC: 14.2 G/DL
LYMPHOCYTES # BLD AUTO: 1.2 K/UL
LYMPHOCYTES NFR BLD: 28.3 %
MAGNESIUM SERPL-MCNC: 1.8 MG/DL
MCH RBC QN AUTO: 30.3 PG
MCHC RBC AUTO-ENTMCNC: 32.9 %
MCV RBC AUTO: 92 FL
MONOCYTES # BLD AUTO: 0.6 K/UL
MONOCYTES NFR BLD: 13.3 %
NEUTROPHILS # BLD AUTO: 2.3 K/UL
NEUTROPHILS NFR BLD: 55.1 %
PHOSPHATE SERPL-MCNC: 3.7 MG/DL
PLATELET # BLD AUTO: 170 K/UL
PMV BLD AUTO: 10.6 FL
POTASSIUM SERPL-SCNC: 4.7 MMOL/L
RBC # BLD AUTO: 4.68 M/UL
SODIUM SERPL-SCNC: 137 MMOL/L
WBC # BLD AUTO: 4.21 K/UL

## 2017-06-26 PROCEDURE — 80069 RENAL FUNCTION PANEL: CPT

## 2017-06-26 PROCEDURE — 83735 ASSAY OF MAGNESIUM: CPT

## 2017-06-26 PROCEDURE — 80197 ASSAY OF TACROLIMUS: CPT

## 2017-06-26 PROCEDURE — 85025 COMPLETE CBC W/AUTO DIFF WBC: CPT

## 2017-06-26 PROCEDURE — 36415 COLL VENOUS BLD VENIPUNCTURE: CPT | Mod: PO

## 2017-06-27 LAB — TACROLIMUS BLD-MCNC: 10 NG/ML

## 2017-06-27 NOTE — PROGRESS NOTES
Results reviewed, and action is needed: Please decrease Prograf/tacrolimus to 2 mg twice daily for goal of ~ 6.

## 2017-06-28 ENCOUNTER — TELEPHONE (OUTPATIENT)
Dept: TRANSPLANT | Facility: CLINIC | Age: 62
End: 2017-06-28

## 2017-06-28 NOTE — TELEPHONE ENCOUNTER
Call placed to patient regarding appointments. Patient unable to come to clinic visit today. Patient has a  financial HOLD, appointments cannot be booked or rescheduled. Transplant financial services called, hold should be removed. Call placed to [facility] [employer], message left on voicemail.

## 2017-06-29 ENCOUNTER — TELEPHONE (OUTPATIENT)
Dept: TRANSPLANT | Facility: CLINIC | Age: 62
End: 2017-06-29

## 2017-06-29 NOTE — TELEPHONE ENCOUNTER
Call returned from Patient financial services. Patient has a bad debt balance of $4100 in which payment arrangements can not be made. He also has a current transplant related balance of $525. Appointments are blocked for booking when this balance reached $500. Patient has received a financial assistance application and is agreeable to complete it today. HOLD temporarily lifted to book transplant related appointments today.

## 2017-07-12 ENCOUNTER — TELEPHONE (OUTPATIENT)
Dept: TRANSPLANT | Facility: CLINIC | Age: 62
End: 2017-07-12

## 2017-07-12 NOTE — TELEPHONE ENCOUNTER
Call placed to patient in review of several missed appointment. No answer. Message left on voicemail. Patient outstanding for follow up after kidney transplant. Financial services has allowed appointments to be scheduled for Transplant follow up.

## 2017-07-16 ENCOUNTER — HOSPITAL ENCOUNTER (EMERGENCY)
Facility: HOSPITAL | Age: 62
Discharge: HOME | End: 2017-07-17
Attending: INTERNAL MEDICINE
Payer: MEDICARE

## 2017-07-16 VITALS
WEIGHT: 180 LBS | TEMPERATURE: 98 F | DIASTOLIC BLOOD PRESSURE: 65 MMHG | OXYGEN SATURATION: 97 % | SYSTOLIC BLOOD PRESSURE: 178 MMHG | HEART RATE: 63 BPM | HEIGHT: 70 IN | BODY MASS INDEX: 25.77 KG/M2 | RESPIRATION RATE: 15 BRPM

## 2017-07-16 DIAGNOSIS — G47.33 OSA (OBSTRUCTIVE SLEEP APNEA): Primary | ICD-10-CM

## 2017-07-16 DIAGNOSIS — J45.30 MILD PERSISTENT ASTHMA WITHOUT COMPLICATION: ICD-10-CM

## 2017-07-16 PROCEDURE — 25500020 PHARM REV CODE 255: Performed by: INTERNAL MEDICINE

## 2017-07-16 PROCEDURE — 96360 HYDRATION IV INFUSION INIT: CPT | Mod: 59

## 2017-07-16 PROCEDURE — 99284 EMERGENCY DEPT VISIT MOD MDM: CPT | Mod: 25

## 2017-07-16 PROCEDURE — 25000003 PHARM REV CODE 250: Performed by: INTERNAL MEDICINE

## 2017-07-16 PROCEDURE — 96361 HYDRATE IV INFUSION ADD-ON: CPT

## 2017-07-16 RX ORDER — SODIUM CHLORIDE 9 MG/ML
1000 INJECTION, SOLUTION INTRAVENOUS
Status: COMPLETED | OUTPATIENT
Start: 2017-07-16 | End: 2017-07-16

## 2017-07-16 RX ADMIN — SODIUM CHLORIDE 1000 ML: 0.9 INJECTION, SOLUTION INTRAVENOUS at 07:07

## 2017-07-16 RX ADMIN — IOHEXOL 100 ML: 350 INJECTION, SOLUTION INTRAVENOUS at 08:07

## 2017-07-17 NOTE — ED PROVIDER NOTES
SCRIBE #1 NOTE: I, Bryan Lim, am scribing for, and in the presence of, Avel Estrada MD. I have scribed the entire note.      History      Chief Complaint   Patient presents with    Shortness of Breath     here in er this am states having trouble breathing again       Review of patient's allergies indicates:   Allergen Reactions    Tylenol [acetaminophen]      Told not to take per Transplant Team        HPI   HPI    2017, 7:14 PM   History obtained from the patient      History of Present Illness: Lavelle Ladd is a 61 y.o. male patient who presents to the Emergency Department for an evaluation of SOB which onset gradually this morning. Pt was reportedly seen in this ED earlier this morning for similar sx, but returned due to sx returning. Symptoms are intermittent and moderate in severity. Exacerbated by nothing and relieved by nothing. Patient denies any recent travel, long car trips, fever, leg pain/swelling, CP, cough, n/v, and all other sxs at this time. No further complaints or concerns at this time.     Arrival mode: Personal vehicle    PCP: Rishabh Esteban MD       Past Medical History:  Past Medical History:   Diagnosis Date    DINORAH (acute kidney injury) 2016    Arthritis     CHF (congestive heart failure)     Chronic obstructive pulmonary disease 2016    Coronary artery disease involving native coronary artery of native heart without angina pectoris 2016     donor kidney transplant for DM 16     Induction with Thymo x3 and IV solumedrol to total 875mg  Kidney Biopsy  2016: 16 glomeruli, ACR type 1 AVR type 2, significant microcirculatory changes, c4d negative, No DSA, 5 to10% fibrosis. Treated with thymo x8 2016- no rejection      Diastolic heart failure     DM2 (diabetes mellitus, type 2)     Encounter for blood transfusion     ESRD on RRT since 10/2013 10/29/2013    Biopsy proven diabetic nephropathy and lymphoplasmacytic interstitial  infiltrate not c/w with AIN (ddx sjogrens or assoc with tamm-horsefall protein extravasation)     GERD (gastroesophageal reflux disease)     History of hepatitis C, s/p successful Rx w/ SVR12 - 4/2017 4/5/2017    Completed 12 weeks harvoni w/ SVR    Hyperlipidemia     Hypertension     Prophylactic immunotherapy     Proteinuria     PVD (peripheral vascular disease) 6/26/2017    RLE BKA CT 12/11/16 Extensive atherosclerotic disease of the aorta and mesenteric arteries.     Reactive airway disease     Renal hypertension     Type 2 diabetes mellitus with diabetic neuropathy, with long-term current use of insulin 12/1/2016    Type 2 diabetes mellitus with hyperglycemia, with long-term current use of insulin     Type 2 diabetes mellitus with retinopathy, with long-term current use of insulin 12/1/2016    Vitamin B12 deficiency        Past Surgical History:  Past Surgical History:   Procedure Laterality Date    av bovine graft      Left UE    AV FISTULA PLACEMENT      left UE    CARDIAC CATHETERIZATION  02/2015    KIDNEY TRANSPLANT  05/21/2016    LEG AMPUTATION THROUGH KNEE  2011    right LE, started as nail puncture leading to diabetic ulcer         Family History:  Family History   Problem Relation Age of Onset    Cancer Father     Diabetes Father     Heart failure Father     Stroke Father     Heart failure Mother     Kidney disease Neg Hx        Social History:  Social History     Social History Main Topics    Smoking status: Former Smoker     Packs/day: 1.00     Years: 40.00     Quit date: 1/11/2013    Smokeless tobacco: Never Used    Alcohol use 3.6 oz/week     6 Cans of beer per week      Comment: 6 beers/week    Drug use: No    Sexual activity: No       ROS   Review of Systems   Constitutional: Negative for appetite change, chills and fever.   HENT: Negative for sore throat and trouble swallowing.    Respiratory: Positive for shortness of breath. Negative for cough and chest tightness.   "  Cardiovascular: Negative for chest pain, palpitations and leg swelling.   Gastrointestinal: Negative for abdominal pain, diarrhea, nausea and vomiting.   Genitourinary: Negative for decreased urine volume, difficulty urinating, dysuria, frequency and hematuria.   Musculoskeletal: Negative for back pain, neck pain and neck stiffness.   Skin: Negative for rash.   Neurological: Negative for dizziness, weakness, light-headedness and headaches.   Hematological: Does not bruise/bleed easily.     Physical Exam      Initial Vitals [07/16/17 1900]   BP Pulse Resp Temp SpO2   115/69 74 20 96 °F (35.6 °C) --      MAP       84.33          Physical Exam  Nursing Notes and Vital Signs Reviewed.  Constitutional: Patient is in no acute distress. Well-developed and well-nourished.  Head: Atraumatic. Normocephalic.  Eyes: PERRL. EOM intact. Conjunctivae are not pale. No scleral icterus.  ENT: Mucous membranes are moist. Oropharynx is clear and symmetric.    Neck: Supple. Full ROM. No lymphadenopathy.  Cardiovascular: Regular rate. Regular rhythm. No murmurs, rubs, or gallops. Distal pulses are 2+ and symmetric.  Pulmonary/Chest: Decreased breath sounds on the right.  Abdominal: Soft and non-distended.  There is no tenderness.   Musculoskeletal: Moves all extremities.Right BKA noted.   Neurological:  Alert, awake, and appropriate.  Normal speech.  No acute focal neurological deficits are appreciated.  Psychiatric: Normal affect. Good eye contact. Appropriate in content.    ED Course    Procedures  ED Vital Signs:  Vitals:    07/16/17 1900 07/16/17 2248 07/16/17 2348   BP: 115/69 (!) 180/106 (!) 178/65   Pulse: 74 63 63   Resp: 20 18 15   Temp: 96 °F (35.6 °C) 98.1 °F (36.7 °C) 98 °F (36.7 °C)   TempSrc: Tympanic Oral Oral   SpO2:  97% 97%   Weight: 81.6 kg (180 lb)     Height: 5' 10" (1.778 m)             Imaging Results:  Imaging Results          CTA Chest Non-Coronary (PE Study) (Final result)  Result time 07/16/17 20:59:57    " Final result by Jaydno Young Jr., MD (07/16/17 20:59:57)                 Impression:       No pulmonary emboli.  Aortic calcifications.  No acute pulmonary parenchymal findings.    All CT scans at this facility use dose modulation, iterative reconstruction, and/or weight based dosing when appropriate to reduce radiation dose to as low as reasonably achievable.      Electronically signed by: JAYDON YOUNG MD  Date:     07/16/17  Time:    20:59              Narrative:    EXAM:   CTA CHEST NON CORONARY    CLINICAL HISTORY:  severe SOB ; kidney transplant      COMPARISON: None    TECHNIQUE:  Post contrast axial images were obtained. Multiplanar thick Slab MIP reconstruction images were obtained and stored.  100 mL Omnipaque 350 was administered.    FINDINGS:     Angiogram findings: Good opacification of the pulmonary arterial system.  The central pulmonary emboli.  No peripheral pulmonary emboli.    Aortic calcifications.  No aortic aneurysm or dissection.    Pulmonary findings: Lungs appear clear of active disease.  No infiltrates or effusions.  No dominant adenopathy.  No acute upper abdominal findings.  Elongated gallbladder.  No intrahepatic bile duct dilatation is evident.                                      The Emergency Provider reviewed the vital signs and test results, which are outlined above.    ED Discussion     8:51 PM: IV fluids to protect kidney transplants from the IV dye. 1 liter given and now 1 liter of fluid IV is being given. Pt is having CT done.     10:42 PM: No signs of pneumothorax via bedside ultrasound.     11:18 PM: Reassessed pt at this time. Pt is awake, alert, and in NAD. Pt states his condition has improved at this time. Discussed with pt all pertinent ED information and results. Discussed pt dx of MARIA INES and plan of tx. Gave pt all f/u and return to the ED instructions.Advised pt to f/u with referred physician concerning possible sleep apnea or asthma. All questions and concerns were  addressed at this time. Pt expresses understanding of information and instructions, and is comfortable with plan to discharge. Pt is stable for discharge.    I discussed with patient and/or family/caretaker that evaluation in the ED does not suggest any emergent or life threatening medical conditions requiring immediate intervention beyond what was provided in the ED, and I believe patient is safe for discharge.  Regardless, an unremarkable evaluation in the ED does not preclude the development or presence of a serious of life threatening condition. As such, patient was instructed to return immediately for any worsening or change in current symptoms.      ED Medication(s):  Medications   0.9%  NaCl infusion (0 mLs Intravenous Stopped 7/16/17 6377)   omnipaque 350 iohexol 100 mL (100 mLs Intravenous Given 7/16/17 2057)       Discharge Medication List as of 7/16/2017 11:26 PM          Follow-up Information     Kaiser Wright MD. Schedule an appointment as soon as possible for a visit in 3 days.    Specialty:  Pulmonary Disease  Contact information:  [address]  [place] [address] [phone]                     Medical Decision Making    Medical Decision Making:   Clinical Tests:   Radiological Study: Ordered and Reviewed           Scribe Attestation:   Scribe #1: I performed the above scribed service and the documentation accurately describes the services I performed. I attest to the accuracy of the note.    Attending:   Physician Attestation Statement for Scribe #1: I, Avel Estrada MD, personally performed the services described in this documentation, as scribed by Bryan Lim, in my presence, and it is both accurate and complete.          Clinical Impression       ICD-10-CM ICD-9-CM   1. MARIA INES (obstructive sleep apnea) G47.33 327.23   2. Mild persistent asthma without complication J45.30 493.90       Disposition:   Disposition: Discharged  Condition: Stable         Avel Estrada  MD  07/17/17 7312

## 2017-07-21 ENCOUNTER — EPISODE CHANGES (OUTPATIENT)
Dept: HEPATOLOGY | Facility: CLINIC | Age: 62
End: 2017-07-21

## 2017-09-06 ENCOUNTER — PROCEDURE VISIT (OUTPATIENT)
Dept: PULMONOLOGY | Facility: CLINIC | Age: 62
End: 2017-09-06
Payer: MEDICARE

## 2017-09-06 ENCOUNTER — TELEPHONE (OUTPATIENT)
Dept: PULMONOLOGY | Facility: CLINIC | Age: 62
End: 2017-09-06

## 2017-09-06 ENCOUNTER — OFFICE VISIT (OUTPATIENT)
Dept: PULMONOLOGY | Facility: CLINIC | Age: 62
End: 2017-09-06
Payer: MEDICARE

## 2017-09-06 VITALS
DIASTOLIC BLOOD PRESSURE: 78 MMHG | WEIGHT: 213.88 LBS | BODY MASS INDEX: 30.62 KG/M2 | HEIGHT: 70 IN | SYSTOLIC BLOOD PRESSURE: 116 MMHG | OXYGEN SATURATION: 98 % | HEART RATE: 69 BPM

## 2017-09-06 DIAGNOSIS — J44.89 ASTHMA WITH COPD: Primary | ICD-10-CM

## 2017-09-06 DIAGNOSIS — R06.02 SOB (SHORTNESS OF BREATH): ICD-10-CM

## 2017-09-06 DIAGNOSIS — J44.9 CHRONIC OBSTRUCTIVE PULMONARY DISEASE, UNSPECIFIED COPD TYPE: ICD-10-CM

## 2017-09-06 DIAGNOSIS — I73.9 PVD (PERIPHERAL VASCULAR DISEASE): ICD-10-CM

## 2017-09-06 DIAGNOSIS — H54.7 DECREASED VISION: ICD-10-CM

## 2017-09-06 PROCEDURE — 99214 OFFICE O/P EST MOD 30 MIN: CPT | Mod: 25,S$PBB,, | Performed by: INTERNAL MEDICINE

## 2017-09-06 PROCEDURE — 94060 EVALUATION OF WHEEZING: CPT | Mod: PBBFAC,PO | Performed by: GENERAL PRACTICE

## 2017-09-06 PROCEDURE — 94729 DIFFUSING CAPACITY: CPT | Mod: 26,S$PBB,, | Performed by: INTERNAL MEDICINE

## 2017-09-06 PROCEDURE — 94726 PLETHYSMOGRAPHY LUNG VOLUMES: CPT | Mod: PBBFAC,PO | Performed by: GENERAL PRACTICE

## 2017-09-06 PROCEDURE — 94060 EVALUATION OF WHEEZING: CPT | Mod: 26,S$PBB,, | Performed by: INTERNAL MEDICINE

## 2017-09-06 PROCEDURE — 94727 GAS DIL/WSHOT DETER LNG VOL: CPT | Mod: PBBFAC,PO | Performed by: GENERAL PRACTICE

## 2017-09-06 PROCEDURE — 3078F DIAST BP <80 MM HG: CPT | Mod: ,,, | Performed by: INTERNAL MEDICINE

## 2017-09-06 PROCEDURE — 94729 DIFFUSING CAPACITY: CPT | Mod: PBBFAC,PO | Performed by: GENERAL PRACTICE

## 2017-09-06 PROCEDURE — 99215 OFFICE O/P EST HI 40 MIN: CPT | Mod: PBBFAC,PO | Performed by: INTERNAL MEDICINE

## 2017-09-06 PROCEDURE — 99999 PR PBB SHADOW E&M-EST. PATIENT-LVL V: CPT | Mod: PBBFAC,,, | Performed by: INTERNAL MEDICINE

## 2017-09-06 PROCEDURE — 3074F SYST BP LT 130 MM HG: CPT | Mod: ,,, | Performed by: INTERNAL MEDICINE

## 2017-09-06 PROCEDURE — 94726 PLETHYSMOGRAPHY LUNG VOLUMES: CPT | Mod: 26,S$PBB,, | Performed by: INTERNAL MEDICINE

## 2017-09-06 RX ORDER — ALBUTEROL SULFATE 90 UG/1
2 AEROSOL, METERED RESPIRATORY (INHALATION) EVERY 4 HOURS PRN
Qty: 18 G | Refills: 3 | Status: SHIPPED | OUTPATIENT
Start: 2017-09-06 | End: 2017-11-03

## 2017-09-06 RX ORDER — IPRATROPIUM BROMIDE AND ALBUTEROL SULFATE 2.5; .5 MG/3ML; MG/3ML
3 SOLUTION RESPIRATORY (INHALATION) EVERY 6 HOURS
Qty: 120 VIAL | Refills: 12 | Status: SHIPPED | OUTPATIENT
Start: 2017-09-06 | End: 2017-11-03

## 2017-09-06 RX ORDER — BUDESONIDE AND FORMOTEROL FUMARATE DIHYDRATE 160; 4.5 UG/1; UG/1
2 AEROSOL RESPIRATORY (INHALATION) EVERY 12 HOURS
Qty: 1 INHALER | Refills: 12 | Status: SHIPPED | OUTPATIENT
Start: 2017-09-06 | End: 2019-09-09 | Stop reason: CLARIF

## 2017-09-06 NOTE — PROGRESS NOTES
Subjective:       Patient ID: Lavelle Ladd is a 62 y.o. male.    Chief Complaint: No chief complaint on file.    HPI COPD  He presents for evaluation and treatment of COPD. The patient is not currently have symptoms / an exacerbation. The patient has COPD for approximately 10 years. Symptoms in previous episodes have included dyspnea, and typically last 2 weeks. Previous episodes have been exacerbated by moderate activity and yardwork. Current treatment includes albuterol inhaler, which generally provides some relief of symptoms.     He uses 1 pillows at night. Patient currently is not on home oxygen therapy.. The patient is having no constitutional symptoms, denying fever, chills, anorexia, or weight loss. The patient has not been hospitalized for this condition before. He quit smoking approximately 6 years ago. The patient is experiencing exercise intolerance (difficulty walking 3 blocks on flat ground).    Kidney transplant 1 year ago  Stopped smoking 5-6 years ago    C/o claudication in leg    Past Medical History:   Diagnosis Date    DINORAH (acute kidney injury) 2016    Arthritis     CHF (congestive heart failure)     Chronic obstructive pulmonary disease 2016    Coronary artery disease involving native coronary artery of native heart without angina pectoris 2016     donor kidney transplant for DM 16     Induction with Thymo x3 and IV solumedrol to total 875mg  Kidney Biopsy  2016: 16 glomeruli, ACR type 1 AVR type 2, significant microcirculatory changes, c4d negative, No DSA, 5 to10% fibrosis. Treated with thymo x8 2016- no rejection      Diastolic heart failure     DM2 (diabetes mellitus, type 2)     Encounter for blood transfusion     ESRD on RRT since 10/2013 10/29/2013    Biopsy proven diabetic nephropathy and lymphoplasmacytic interstitial infiltrate not c/w with AIN (ddx sjogrens or assoc with tamm-horsefall protein extravasation)     GERD  (gastroesophageal reflux disease)     History of hepatitis C, s/p successful Rx w/ SVR12 - 4/2017 4/5/2017    Completed 12 weeks harvoni w/ SVR    Hyperlipidemia     Hypertension     Prophylactic immunotherapy     Proteinuria     PVD (peripheral vascular disease) 6/26/2017    RLE BKA CT 12/11/16 Extensive atherosclerotic disease of the aorta and mesenteric arteries.     Reactive airway disease     Renal hypertension     Type 2 diabetes mellitus with diabetic neuropathy, with long-term current use of insulin 12/1/2016    Type 2 diabetes mellitus with hyperglycemia, with long-term current use of insulin     Type 2 diabetes mellitus with retinopathy, with long-term current use of insulin 12/1/2016    Vitamin B12 deficiency      Past Surgical History:   Procedure Laterality Date    av bovine graft      Left UE    AV FISTULA PLACEMENT      left UE    CARDIAC CATHETERIZATION  02/2015    KIDNEY TRANSPLANT  05/21/2016    LEG AMPUTATION THROUGH KNEE  2011    right LE, started as nail puncture leading to diabetic ulcer     Social History     Social History    Marital status: Single     Spouse name: N/A    Number of children: N/A    Years of education: N/A     Occupational History    Disabled      Disabled     Social History Main Topics    Smoking status: Former Smoker     Packs/day: 1.00     Years: 40.00     Quit date: 1/11/2013    Smokeless tobacco: Never Used    Alcohol use 3.6 oz/week     6 Cans of beer per week      Comment: 6 beers/week    Drug use: No    Sexual activity: No     Other Topics Concern    Not on file     Social History Narrative    Lives alone. Retired from construction and various jobs, now retired. Would like to return to work PT to alleviate boredom.     Review of Systems   Constitutional: Positive for fatigue.   Eyes:        Decreased vision left eye   Respiratory: Positive for cough, wheezing and dyspnea on extertion.    Cardiovascular:        Leg pain with exercise    Genitourinary: Negative.    Endocrine: endocrine negative   Musculoskeletal: Positive for arthralgias.   Skin: Negative.    Gastrointestinal: Negative.    Neurological: Positive for weakness.   Psychiatric/Behavioral: Negative.        Objective:      Physical Exam   Constitutional: He is oriented to person, place, and time. He appears well-developed and well-nourished.   HENT:   Head: Normocephalic and atraumatic.   Mouth/Throat: Oropharyngeal exudate present.   Eyes: Conjunctivae are normal. Pupils are equal, round, and reactive to light.   Neck: Neck supple. No JVD present. No tracheal deviation present. No thyromegaly present.   Cardiovascular: Normal rate, regular rhythm and normal heart sounds.  Exam reveals decreased pulses.    No murmur heard.  Pulmonary/Chest: Effort normal. He has decreased breath sounds. He has wheezes in the right lower field and the left lower field. He has no rhonchi. He has no rales.   Abdominal: Soft. Bowel sounds are normal.   Musculoskeletal: He exhibits no edema or tenderness.   Right leg amputated   Lymphadenopathy:     He has no cervical adenopathy.   Neurological: He is alert and oriented to person, place, and time.   Skin: Skin is warm and dry.   Nursing note and vitals reviewed.    Personal Diagnostic Review  Chest X-Ray: I personally reviewed the films and findings are:, air trapping/emphysema  Pulmonary function tests:  Normal;  No flowsheet data found.      Assessment:       1. Asthma with COPD    2. Decreased vision    3. PVD (peripheral vascular disease)    4. Chronic obstructive pulmonary disease, unspecified COPD type        Outpatient Encounter Prescriptions as of 9/6/2017   Medication Sig Dispense Refill    albuterol (VENTOLIN HFA) 90 mcg/actuation inhaler Inhale 2 puffs into the lungs every 4 (four) hours as needed for Wheezing. 18 g 3    albuterol-ipratropium 2.5mg-0.5mg/3mL (DUO-NEB) 0.5 mg-3 mg(2.5 mg base)/3 mL nebulizer solution Take 3 mLs by nebulization  "every 6 (six) hours. 120 vial 12    BD INSULIN PEN NEEDLE UF SHORT 31 gauge x 5/16" Ndle       BD INSULIN SYRINGE ULTRA-FINE 1 mL 31 gauge x 5/16 Syrg       blood sugar diagnostic Strp 1 each by Misc.(Non-Drug; Combo Route) route 3 (three) times daily. 100 each 11    blood-glucose meter kit Use as instructed 1 each 0    budesonide-formoterol 160-4.5 mcg (SYMBICORT) 160-4.5 mcg/actuation HFAA Inhale 2 puffs into the lungs every 12 (twelve) hours. Wash out mouth after using 1 Inhaler 12    ergocalciferol (ERGOCALCIFEROL) 50,000 unit Cap Take 1 capsule (50,000 Units total) by mouth every 7 days. Take on Mondays 4 capsule 11    ERTAPENEM SODIUM (ERTAPENEM, INVANZ, 1 G/100 ML NS, READY TO MIX,) Inject 100 mLs (1 g total) into the vein once daily.      insulin syringe,safetyneedle 1 mL 31 gauge x 5/16" Syrg 1 Syringe by Misc.(Non-Drug; Combo Route) route 4 (four) times daily with meals and nightly. 120 Syringe 11    lancets Misc 1 each by Misc.(Non-Drug; Combo Route) route 3 (three) times daily. 100 each 11    ledipasvir-sofosbuvir  mg Tab Take by mouth once daily.      metoprolol tartrate (LOPRESSOR) 50 MG tablet Take 0.5 tablets (25 mg total) by mouth 2 (two) times daily. 30 tablet 11    multivitamin (THERAGRAN) tablet Take 1 tablet by mouth once daily. 30 tablet 11    mycophenolate (CELLCEPT) 250 mg Cap Take 2 capsules (500 mg total) by mouth 2 (two) times daily. Z94.0 Kidney Transplant 5/21/2016. 120 capsule 11    ondansetron (ZOFRAN-ODT) 4 MG TbDL Take 1 tablet (4 mg total) by mouth every 8 (eight) hours as needed. 21 tablet 1    oxycodone (ROXICODONE) 10 mg Tab immediate release tablet       oxycodone (ROXICODONE) 15 MG Tab Take 1 tablet (15 mg total) by mouth every 6 (six) hours as needed for Pain. 20 tablet 0    pen needle, diabetic (EASY COMFORT PEN NEEDLES) 32 gauge x 5/32" Ndle Inject 1 each into the skin 3 (three) times daily. 100 each 11    pen needle, diabetic 31 gauge x 1/4" Ndle 1 " each by Misc.(Non-Drug; Combo Route) route 4 (four) times daily. 100 each 0    predniSONE (DELTASONE) 10 MG tablet Take 10 mg by mouth once daily.      sodium bicarbonate 650 MG tablet Take 4 tablets BID  four hours before or after Harvoni 240 tablet 11    tacrolimus (PROGRAF) 1 MG Cap Take 3mg in the AM and 2mg in the PM by mouth. Z94.0 Kidney Transplant (Patient taking differently: Take 4mg in the AM and 2mg in the PM by mouth. Z94.0 Kidney Transplant) 150 capsule 11    [DISCONTINUED] albuterol (VENTOLIN HFA) 90 mcg/actuation inhaler Inhale 2 puffs into the lungs every 4 (four) hours as needed for Wheezing. 18 g 3    [DISCONTINUED] albuterol-ipratropium 2.5mg-0.5mg/3mL (DUO-NEB) 0.5 mg-3 mg(2.5 mg base)/3 mL nebulizer solution Take 3 mLs by nebulization every 6 (six) hours. 120 vial 12    [DISCONTINUED] budesonide-formoterol 160-4.5 mcg (SYMBICORT) 160-4.5 mcg/actuation HFAA Inhale 2 puffs into the lungs every 12 (twelve) hours. Wash out mouth after using 1 Inhaler 12    inhalation device (BREATHERITE VALVED MDI CHAMBER) Use as directed for inhalation. 1 Device prn    insulin NPH (NOVOLIN N) 100 unit/mL injection Take 25 units subq with breakfast and 15 units at bedtime 4 vial 3    insulin regular (NOVOLIN R) 100 unit/mL Inj injection Inject 6 units with breakfast and 8 units with dinner, subcutaneously 30 min before meal. (Patient taking differently: Inject 10 units with breakfast and 8 units with dinner, subcutaneously 30 min before meal.) 10 mL 11    tramadol (ULTRAM-ER) 100 MG Tb24 Take 100 mg by mouth as needed.      [DISCONTINUED] amoxicillin-clavulanate 500-125mg (AUGMENTIN) 500-125 mg Tab Take 1 tablet (500 mg total) by mouth 2 (two) times daily. 19 tablet 0    [DISCONTINUED] famotidine (PEPCID) 20 MG tablet Take 1 tablet (20 mg total) by mouth every evening. 30 tablet 11    [DISCONTINUED] olanzapine (ZYPREXA) 5 MG tablet Take 1 tablet (5 mg total) by mouth every evening. (Patient  taking differently: Take 5 mg by mouth once daily. ) 30 tablet 11     No facility-administered encounter medications on file as of 9/6/2017.      Orders Placed This Encounter   Procedures    VAS US Doppler Arterial Leg Left     Standing Status:   Future     Standing Expiration Date:   9/6/2018    Ambulatory referral to Cardiology     Referral Priority:   Routine     Referral Type:   Consultation     Referral Reason:   Specialty Services Required     Requested Specialty:   Cardiology     Number of Visits Requested:   1    Ambulatory referral to Ophthalmology     Referral Priority:   Routine     Referral Type:   Consultation     Referral Reason:   Specialty Services Required     Requested Specialty:   Ophthalmology     Number of Visits Requested:   1    Spirometry with/without bronchodilator     Standing Status:   Future     Standing Expiration Date:   9/6/2018     Plan:       Requested Prescriptions     Signed Prescriptions Disp Refills    albuterol (VENTOLIN HFA) 90 mcg/actuation inhaler 18 g 3     Sig: Inhale 2 puffs into the lungs every 4 (four) hours as needed for Wheezing.    albuterol-ipratropium 2.5mg-0.5mg/3mL (DUO-NEB) 0.5 mg-3 mg(2.5 mg base)/3 mL nebulizer solution 120 vial 12     Sig: Take 3 mLs by nebulization every 6 (six) hours.    budesonide-formoterol 160-4.5 mcg (SYMBICORT) 160-4.5 mcg/actuation HFAA 1 Inhaler 12     Sig: Inhale 2 puffs into the lungs every 12 (twelve) hours. Wash out mouth after using     Asthma with COPD  -     albuterol (VENTOLIN HFA) 90 mcg/actuation inhaler; Inhale 2 puffs into the lungs every 4 (four) hours as needed for Wheezing.  Dispense: 18 g; Refill: 3  -     budesonide-formoterol 160-4.5 mcg (SYMBICORT) 160-4.5 mcg/actuation HFAA; Inhale 2 puffs into the lungs every 12 (twelve) hours. Wash out mouth after using  Dispense: 1 Inhaler; Refill: 12    Decreased vision  -     Ambulatory referral to Ophthalmology    PVD (peripheral vascular disease)  -     Ambulatory  referral to Cardiology  -     VAS US Doppler Arterial Leg Left; Future    Chronic obstructive pulmonary disease, unspecified COPD type  -     albuterol-ipratropium 2.5mg-0.5mg/3mL (DUO-NEB) 0.5 mg-3 mg(2.5 mg base)/3 mL nebulizer solution; Take 3 mLs by nebulization every 6 (six) hours.  Dispense: 120 vial; Refill: 12  -     Spirometry with/without bronchodilator; Future; Expected date: 03/09/2018      Return in about 6 months (around 3/6/2018) for birgit.    MEDICAL DECISION MAKING: Moderate  complexity.  Overall, the multiple problems listed are of moderate severity that may impact quality of life and activities of daily living. Side effects of medications, treatment plan as well as options and alternatives reviewed and discussed with patient. There was counseling of patient concerning these issues.    Total time spent in face to face counseling and coordination of care - 25 minutes over 50% of time was used in discussion of prognosis, risks, benefits of treatment, instructions and compliance with regimen . Discussion with other physicians and/or health care providers ( home health or for use of durable medical equipment (oxygen, nebulizers, CPAP, BiPAP) occurred.

## 2017-09-14 ENCOUNTER — TELEPHONE (OUTPATIENT)
Dept: PULMONOLOGY | Facility: CLINIC | Age: 62
End: 2017-09-14

## 2017-09-14 ENCOUNTER — HOSPITAL ENCOUNTER (OUTPATIENT)
Dept: RADIOLOGY | Facility: HOSPITAL | Age: 62
Discharge: HOME | End: 2017-09-14
Attending: INTERNAL MEDICINE
Payer: MEDICARE

## 2017-09-14 DIAGNOSIS — I73.9 PVD (PERIPHERAL VASCULAR DISEASE): ICD-10-CM

## 2017-09-14 DIAGNOSIS — I73.9 PVD (PERIPHERAL VASCULAR DISEASE): Primary | ICD-10-CM

## 2017-09-14 PROCEDURE — 93926 LOWER EXTREMITY STUDY: CPT | Mod: TC,PO

## 2017-09-14 PROCEDURE — 93926 LOWER EXTREMITY STUDY: CPT | Mod: 26,,, | Performed by: RADIOLOGY

## 2017-09-19 ENCOUNTER — OFFICE VISIT (OUTPATIENT)
Dept: CARDIOLOGY | Facility: CLINIC | Age: 62
End: 2017-09-19
Payer: MEDICARE

## 2017-09-19 VITALS
SYSTOLIC BLOOD PRESSURE: 132 MMHG | OXYGEN SATURATION: 98 % | HEART RATE: 77 BPM | HEIGHT: 70 IN | DIASTOLIC BLOOD PRESSURE: 94 MMHG | WEIGHT: 213.88 LBS | BODY MASS INDEX: 30.62 KG/M2

## 2017-09-19 DIAGNOSIS — I73.9 PVD (PERIPHERAL VASCULAR DISEASE): Chronic | ICD-10-CM

## 2017-09-19 DIAGNOSIS — I10 ESSENTIAL HYPERTENSION: ICD-10-CM

## 2017-09-19 DIAGNOSIS — Z79.4 TYPE 2 DIABETES MELLITUS WITH RETINOPATHY, WITH LONG-TERM CURRENT USE OF INSULIN, MACULAR EDEMA PRESENCE UNSPECIFIED, UNSPECIFIED LATERALITY, UNSPECIFIED RETINOPATHY SEVERITY: ICD-10-CM

## 2017-09-19 DIAGNOSIS — J44.9 CHRONIC OBSTRUCTIVE PULMONARY DISEASE, UNSPECIFIED COPD TYPE: ICD-10-CM

## 2017-09-19 DIAGNOSIS — E11.319 TYPE 2 DIABETES MELLITUS WITH RETINOPATHY, WITH LONG-TERM CURRENT USE OF INSULIN, MACULAR EDEMA PRESENCE UNSPECIFIED, UNSPECIFIED LATERALITY, UNSPECIFIED RETINOPATHY SEVERITY: ICD-10-CM

## 2017-09-19 DIAGNOSIS — I13.10 MALIGNANT HTN WITH HEART DISEASE, W/O CHF, WITH CHRONIC KIDNEY DISEASE: Primary | ICD-10-CM

## 2017-09-19 DIAGNOSIS — I25.10 CORONARY ARTERY DISEASE INVOLVING NATIVE CORONARY ARTERY OF NATIVE HEART WITHOUT ANGINA PECTORIS: ICD-10-CM

## 2017-09-19 PROCEDURE — 99999 PR PBB SHADOW E&M-EST. PATIENT-LVL III: CPT | Mod: PBBFAC,,, | Performed by: INTERNAL MEDICINE

## 2017-09-19 PROCEDURE — 99214 OFFICE O/P EST MOD 30 MIN: CPT | Mod: S$PBB,,, | Performed by: INTERNAL MEDICINE

## 2017-09-19 PROCEDURE — 99213 OFFICE O/P EST LOW 20 MIN: CPT | Mod: PBBFAC | Performed by: INTERNAL MEDICINE

## 2017-09-19 NOTE — PROGRESS NOTES
Subjective:   Patient ID:  Lavelle Ladd is a 62 y.o. male who presents for follow-up of PAD.  Pt with numbness of L foot. US with monophasic waveforms below the knee but no ?apparent high grade stenosis.  L foot exam with ischemic changes  Patient denies CP, angina or anginal equivalent.    Hypertension   This is a chronic problem. The current episode started more than 1 year ago. The problem has been gradually improving since onset. The problem is controlled. Pertinent negatives include no chest pain, palpitations or shortness of breath. Past treatments include beta blockers. The current treatment provides moderate improvement. Compliance problems include medication side effects.  Identifiable causes of hypertension include chronic renal disease.   Hyperlipidemia   This is a chronic problem. The current episode started more than 1 year ago. Exacerbating diseases include chronic renal disease. Pertinent negatives include no chest pain or shortness of breath. He is currently on no antihyperlipidemic treatment. Compliance problems include medication side effects.        Review of Systems   Constitution: Negative. Negative for weight gain.   HENT: Negative.    Eyes: Negative.    Cardiovascular: Negative.  Negative for chest pain, leg swelling and palpitations.   Respiratory: Negative.  Negative for shortness of breath.    Endocrine: Negative.    Hematologic/Lymphatic: Negative.    Skin: Negative.    Musculoskeletal: Negative for muscle weakness.   Gastrointestinal: Negative.    Genitourinary: Negative.    Neurological: Negative.  Negative for dizziness.   Psychiatric/Behavioral: Negative.    Allergic/Immunologic: Negative.      Family History   Problem Relation Age of Onset    Cancer Father     Diabetes Father     Heart failure Father     Stroke Father     Heart failure Mother     Kidney disease Neg Hx      Past Medical History:   Diagnosis Date    DINORAH (acute kidney injury) 9/25/2016    Arthritis     CHF  (congestive heart failure)     Chronic obstructive pulmonary disease 2016    Coronary artery disease involving native coronary artery of native heart without angina pectoris 2016     donor kidney transplant for DM 16     Induction with Thymo x3 and IV solumedrol to total 875mg  Kidney Biopsy  2016: 16 glomeruli, ACR type 1 AVR type 2, significant microcirculatory changes, c4d negative, No DSA, 5 to10% fibrosis. Treated with thymo x8 2016- no rejection      Diastolic heart failure     DM2 (diabetes mellitus, type 2)     Encounter for blood transfusion     ESRD on RRT since 10/2013 10/29/2013    Biopsy proven diabetic nephropathy and lymphoplasmacytic interstitial infiltrate not c/w with AIN (ddx sjogrens or assoc with tamm-horsefall protein extravasation)     GERD (gastroesophageal reflux disease)     History of hepatitis C, s/p successful Rx w/ SVR12 - 2017    Completed 12 weeks harvoni w/ SVR    Hyperlipidemia     Hypertension     Prophylactic immunotherapy     Proteinuria     PVD (peripheral vascular disease) 2017    RLE BKA CT 16 Extensive atherosclerotic disease of the aorta and mesenteric arteries.     Reactive airway disease     Renal hypertension     Type 2 diabetes mellitus with diabetic neuropathy, with long-term current use of insulin 2016    Type 2 diabetes mellitus with hyperglycemia, with long-term current use of insulin     Type 2 diabetes mellitus with retinopathy, with long-term current use of insulin 2016    Vitamin B12 deficiency      Current Outpatient Prescriptions on File Prior to Visit   Medication Sig Dispense Refill    albuterol (VENTOLIN HFA) 90 mcg/actuation inhaler Inhale 2 puffs into the lungs every 4 (four) hours as needed for Wheezing. 18 g 3    albuterol-ipratropium 2.5mg-0.5mg/3mL (DUO-NEB) 0.5 mg-3 mg(2.5 mg base)/3 mL nebulizer solution Take 3 mLs by nebulization every 6 (six) hours. 120 vial 12  "   BD INSULIN PEN NEEDLE UF SHORT 31 gauge x 5/16" Ndle       BD INSULIN SYRINGE ULTRA-FINE 1 mL 31 gauge x 5/16 Syrg       blood sugar diagnostic Strp 1 each by Misc.(Non-Drug; Combo Route) route 3 (three) times daily. 100 each 11    blood-glucose meter kit Use as instructed 1 each 0    budesonide-formoterol 160-4.5 mcg (SYMBICORT) 160-4.5 mcg/actuation HFAA Inhale 2 puffs into the lungs every 12 (twelve) hours. Wash out mouth after using 1 Inhaler 12    ergocalciferol (ERGOCALCIFEROL) 50,000 unit Cap Take 1 capsule (50,000 Units total) by mouth every 7 days. Take on Mondays 4 capsule 11    ERTAPENEM SODIUM (ERTAPENEM, INVANZ, 1 G/100 ML NS, READY TO MIX,) Inject 100 mLs (1 g total) into the vein once daily.      inhalation device (BREATHERITE VALVED MDI CHAMBER) Use as directed for inhalation. 1 Device prn    insulin NPH (NOVOLIN N) 100 unit/mL injection Take 25 units subq with breakfast and 15 units at bedtime 4 vial 3    insulin regular (NOVOLIN R) 100 unit/mL Inj injection Inject 6 units with breakfast and 8 units with dinner, subcutaneously 30 min before meal. (Patient taking differently: Inject 10 units with breakfast and 8 units with dinner, subcutaneously 30 min before meal.) 10 mL 11    insulin syringe,safetyneedle 1 mL 31 gauge x 5/16" Syrg 1 Syringe by Misc.(Non-Drug; Combo Route) route 4 (four) times daily with meals and nightly. 120 Syringe 11    lancets Misc 1 each by Misc.(Non-Drug; Combo Route) route 3 (three) times daily. 100 each 11    ledipasvir-sofosbuvir  mg Tab Take by mouth once daily.      metoprolol tartrate (LOPRESSOR) 50 MG tablet Take 0.5 tablets (25 mg total) by mouth 2 (two) times daily. 30 tablet 11    multivitamin (THERAGRAN) tablet Take 1 tablet by mouth once daily. 30 tablet 11    mycophenolate (CELLCEPT) 250 mg Cap Take 2 capsules (500 mg total) by mouth 2 (two) times daily. Z94.0 Kidney Transplant 5/21/2016. 120 capsule 11    ondansetron (ZOFRAN-ODT) 4 MG " "TbDL Take 1 tablet (4 mg total) by mouth every 8 (eight) hours as needed. 21 tablet 1    oxycodone (ROXICODONE) 10 mg Tab immediate release tablet       oxycodone (ROXICODONE) 15 MG Tab Take 1 tablet (15 mg total) by mouth every 6 (six) hours as needed for Pain. 20 tablet 0    pen needle, diabetic (EASY COMFORT PEN NEEDLES) 32 gauge x 5/32" Ndle Inject 1 each into the skin 3 (three) times daily. 100 each 11    pen needle, diabetic 31 gauge x 1/4" Ndle 1 each by Misc.(Non-Drug; Combo Route) route 4 (four) times daily. 100 each 0    predniSONE (DELTASONE) 10 MG tablet Take 10 mg by mouth once daily.      sodium bicarbonate 650 MG tablet Take 4 tablets BID  four hours before or after Harvoni 240 tablet 11    tacrolimus (PROGRAF) 1 MG Cap Take 3mg in the AM and 2mg in the PM by mouth. Z94.0 Kidney Transplant (Patient taking differently: Take 4mg in the AM and 2mg in the PM by mouth. Z94.0 Kidney Transplant) 150 capsule 11    [DISCONTINUED] tramadol (ULTRAM-ER) 100 MG Tb24 Take 100 mg by mouth as needed.       No current facility-administered medications on file prior to visit.      Review of patient's allergies indicates:   Allergen Reactions    Tylenol [acetaminophen]      Told not to take per Transplant Team       Objective:     Physical Exam   Constitutional: He is oriented to person, place, and time. He appears well-developed and well-nourished.   HENT:   Head: Normocephalic and atraumatic.   Eyes: Conjunctivae are normal. Pupils are equal, round, and reactive to light.   Neck: Normal range of motion. Neck supple.   Cardiovascular: Normal rate, regular rhythm, normal heart sounds and intact distal pulses.    Pulses:       Femoral pulses are 2+ on the right side, and 2+ on the left side.       Popliteal pulses are 2+ on the right side, and 1+ on the left side.        Dorsalis pedis pulses are 2+ on the right side, and 1+ on the left side.        Posterior tibial pulses are 2+ on the right side, and " 1+ on the left side.   Pulmonary/Chest: Effort normal and breath sounds normal.   Abdominal: Soft. Bowel sounds are normal.   Neurological: He is alert and oriented to person, place, and time.   Skin: Skin is warm and dry.   Psychiatric: He has a normal mood and affect.   Nursing note and vitals reviewed.      Assessment:   1. HTN  2. HLP  3. PAD      Plan:     LE angiogram needs clearance nephrology  Continue metoprolol-htn

## 2017-09-19 NOTE — LETTER
September 22, 2017      Colin Garcia MD  [address] [facility] [address]           [facility]  [address] [place] [id number]  Phone: [phone]  Fax: [phone]          Patient: Lavelle Ladd   MR Number: [id number]   YOB: 1955   Date of Visit: 9/19/2017       Dear Dr. Colin Garcia:    Thank you for referring Lavelle Ladd to me for evaluation. Attached you will find relevant portions of my assessment and plan of care.    If you have questions, please do not hesitate to call me. I look forward to following Lavelle Ladd along with you.    Sincerely,        Enclosure  CC:  No Recipients    If you would like to receive this communication electronically, please contact [email or web address] or [phone] to request more information on [employer] access.    For providers and/or their staff who would like to refer a patient to [facility], please contact us through our one-stop-shop provider referral line, Alec Monsivais, at [phone].    If you feel you have received this communication in error or would no longer like to receive these types of communications, please e-mail [email or web address]

## 2017-10-05 ENCOUNTER — EPISODE CHANGES (OUTPATIENT)
Dept: HEPATOLOGY | Facility: CLINIC | Age: 62
End: 2017-10-05

## 2017-10-05 ENCOUNTER — TELEPHONE (OUTPATIENT)
Dept: TRANSPLANT | Facility: CLINIC | Age: 62
End: 2017-10-05

## 2017-10-05 DIAGNOSIS — Z94.0 KIDNEY REPLACED BY TRANSPLANT: Primary | ICD-10-CM

## 2017-10-05 NOTE — TELEPHONE ENCOUNTER
Call received from patient requesting approval to take Methadone with Tacrolimus. Patient reports that he was prescribed Methadone for back pain by Pain Management, Dr. Lamar Guadalupe in [phone]. Pharmacy will not fill until clearance received.  Will review with Pharm D.

## 2017-10-06 ENCOUNTER — TELEPHONE (OUTPATIENT)
Dept: TRANSPLANT | Facility: CLINIC | Age: 62
End: 2017-10-06

## 2017-10-06 NOTE — TELEPHONE ENCOUNTER
Call placed to pain management office of Dr. Good. Medication reviewed with PharmD. Message left on Nurses line in regards to interaction of Tacrolimus and Methadone. Patient also notified. Transplant remains available for consult. Pain management should follow EKG monitoring is patient to start Methadone.

## 2017-10-06 NOTE — TELEPHONE ENCOUNTER
----- Message from Kaleigh Del Real PharmD sent at 10/6/2017  8:40 AM CDT -----  Regarding: RE: Methadone use with Tacrolimus  Interaction that is likely flagging is prolonged QTc between these two drugs.  The only additional agents on his list that can also prolong the QTc are oxycodone (would he still take this with methadone??) and ondansetron.  This interaction isn't a contraindication, just something we need to be aware of and monitor.  His most recent EKG in [facility] was from July and his QTc was 439; 500 is when we are concerned.     Thanks,  Kaleigh    ----- Message -----  From: Rosita Newton RN  Sent: 10/5/2017   4:21 PM  To: Tiana Blanco MD, #  Subject: Methadone use with Tacrolimus                    Call received from patient requesting approval to take Methadone with Tacrolimus. Patient reports that he was prescribed Methadone for back pain by Pain Management, Dr. Lamar Guadalupe in [phone]. Pharmacy will not fill until clearance received.  Please review and advise.

## 2017-10-09 ENCOUNTER — LAB VISIT (OUTPATIENT)
Dept: LAB | Facility: HOSPITAL | Age: 62
End: 2017-10-09
Attending: INTERNAL MEDICINE
Payer: MEDICARE

## 2017-10-09 DIAGNOSIS — Z94.0 KIDNEY REPLACED BY TRANSPLANT: ICD-10-CM

## 2017-10-09 LAB
ALBUMIN SERPL BCP-MCNC: 3.4 G/DL
ALBUMIN SERPL BCP-MCNC: 3.4 G/DL
ALP SERPL-CCNC: 92 U/L
ALT SERPL W/O P-5'-P-CCNC: 15 U/L
ANION GAP SERPL CALC-SCNC: 8 MMOL/L
AST SERPL-CCNC: 18 U/L
BASOPHILS # BLD AUTO: 0.02 K/UL
BASOPHILS NFR BLD: 0.6 %
BILIRUB DIRECT SERPL-MCNC: 0.2 MG/DL
BILIRUB SERPL-MCNC: 0.5 MG/DL
BUN SERPL-MCNC: 27 MG/DL
CALCIUM SERPL-MCNC: 9.9 MG/DL
CHLORIDE SERPL-SCNC: 104 MMOL/L
CO2 SERPL-SCNC: 30 MMOL/L
CREAT SERPL-MCNC: 1.9 MG/DL
DIFFERENTIAL METHOD: ABNORMAL
EOSINOPHIL # BLD AUTO: 0.1 K/UL
EOSINOPHIL NFR BLD: 4 %
ERYTHROCYTE [DISTWIDTH] IN BLOOD BY AUTOMATED COUNT: 14.3 %
EST. GFR  (AFRICAN AMERICAN): 42.1 ML/MIN/1.73 M^2
EST. GFR  (NON AFRICAN AMERICAN): 36.4 ML/MIN/1.73 M^2
GLUCOSE SERPL-MCNC: 141 MG/DL
HCT VFR BLD AUTO: 47.6 %
HGB BLD-MCNC: 14.8 G/DL
LYMPHOCYTES # BLD AUTO: 1.6 K/UL
LYMPHOCYTES NFR BLD: 46.2 %
MAGNESIUM SERPL-MCNC: 1.7 MG/DL
MCH RBC QN AUTO: 29.3 PG
MCHC RBC AUTO-ENTMCNC: 31.1 G/DL
MCV RBC AUTO: 94 FL
MONOCYTES # BLD AUTO: 0.4 K/UL
MONOCYTES NFR BLD: 12 %
NEUTROPHILS # BLD AUTO: 1.3 K/UL
NEUTROPHILS NFR BLD: 36.9 %
PHOSPHATE SERPL-MCNC: 3.4 MG/DL
PLATELET # BLD AUTO: 213 K/UL
PMV BLD AUTO: 10.3 FL
POTASSIUM SERPL-SCNC: 5.2 MMOL/L
PROT SERPL-MCNC: 7.2 G/DL
RBC # BLD AUTO: 5.05 M/UL
SODIUM SERPL-SCNC: 142 MMOL/L
WBC # BLD AUTO: 3.51 K/UL

## 2017-10-09 PROCEDURE — 82247 BILIRUBIN TOTAL: CPT

## 2017-10-09 PROCEDURE — 87799 DETECT AGENT NOS DNA QUANT: CPT

## 2017-10-09 PROCEDURE — 80197 ASSAY OF TACROLIMUS: CPT

## 2017-10-09 PROCEDURE — 80069 RENAL FUNCTION PANEL: CPT

## 2017-10-09 PROCEDURE — 83735 ASSAY OF MAGNESIUM: CPT

## 2017-10-09 PROCEDURE — 85025 COMPLETE CBC W/AUTO DIFF WBC: CPT

## 2017-10-09 PROCEDURE — 84075 ASSAY ALKALINE PHOSPHATASE: CPT

## 2017-10-10 LAB — TACROLIMUS BLD-MCNC: 16.8 NG/ML

## 2017-10-10 NOTE — PROGRESS NOTES
Results reviewed, and action is needed: Please verify his current dose and whether this is 12 hr level. Goal should be around 5, so I suspect her needs dose reduction.

## 2017-10-11 LAB
BK VIRUS DNA PCR, QUANT, BLOOD: <125 COPIES/ML
BK VIRUS DNA, BLOOD: NOT DETECTED
LOG BKV COPIES/ML: <2.1 LOG (10) COPIES/ML

## 2017-10-13 ENCOUNTER — TELEPHONE (OUTPATIENT)
Dept: TRANSPLANT | Facility: CLINIC | Age: 62
End: 2017-10-13

## 2017-10-13 ENCOUNTER — LAB VISIT (OUTPATIENT)
Dept: LAB | Facility: HOSPITAL | Age: 62
End: 2017-10-13
Attending: INTERNAL MEDICINE
Payer: MEDICARE

## 2017-10-13 DIAGNOSIS — Z94.0 KIDNEY REPLACED BY TRANSPLANT: ICD-10-CM

## 2017-10-13 LAB
ALBUMIN SERPL BCP-MCNC: 3.4 G/DL
ANION GAP SERPL CALC-SCNC: 9 MMOL/L
BUN SERPL-MCNC: 27 MG/DL
CALCIUM SERPL-MCNC: 9.4 MG/DL
CHLORIDE SERPL-SCNC: 107 MMOL/L
CO2 SERPL-SCNC: 24 MMOL/L
CREAT SERPL-MCNC: 1.8 MG/DL
EST. GFR  (AFRICAN AMERICAN): 45 ML/MIN/1.73 M^2
EST. GFR  (NON AFRICAN AMERICAN): 38.9 ML/MIN/1.73 M^2
GLUCOSE SERPL-MCNC: 65 MG/DL
PHOSPHATE SERPL-MCNC: 3.5 MG/DL
POTASSIUM SERPL-SCNC: 4.3 MMOL/L
SODIUM SERPL-SCNC: 140 MMOL/L

## 2017-10-13 PROCEDURE — 36415 COLL VENOUS BLD VENIPUNCTURE: CPT | Mod: PO

## 2017-10-13 PROCEDURE — 80197 ASSAY OF TACROLIMUS: CPT

## 2017-10-13 PROCEDURE — 80069 RENAL FUNCTION PANEL: CPT

## 2017-10-14 LAB — TACROLIMUS BLD-MCNC: 13.7 NG/ML

## 2017-10-14 NOTE — TELEPHONE ENCOUNTER
Call placed to patient in review of Prograf dose. Patient reports that he decreased his dose today to 3mg in the Am and 2mg in the PM.

## 2017-10-14 NOTE — PROGRESS NOTES
Results reviewed, and action is needed: Need to reduce tacrolimus IF this is 12 hr trough. Please clarify if he is on 3/2 or 4/2 since both doses listed.

## 2017-10-15 RX ORDER — TACROLIMUS 1 MG/1
CAPSULE ORAL
Qty: 150 CAPSULE | Refills: 11 | Status: SHIPPED | OUTPATIENT
Start: 2017-10-15 | End: 2017-10-16 | Stop reason: SDUPTHER

## 2017-10-15 NOTE — PROGRESS NOTES
Kidney Post-Transplant Assessment    Referring Physician: Avel Estrada  Current Nephrologist: Avel Estrada    ORGAN: RIGHT KIDNEY  Donor Type:  - brain death   PHS Increased Risk: yes  Cold Ischemia: 1088 mins  Induction Medications: thymoglobulin      Subjective:     CC:  Reassessment of renal allograft function and management of chronic immunosuppression.    Kidney History:    Mr. Ladd is a 61 y.o. year old White male who received a  - brain death kidney transplant on 16.  He has CKD stage 3 - GFR 30-59 and his baseline creatinine is between 1.7-1.9. He takes mycophenolate mofetil, prednisone, and tacrolimus for maintenance immunosuppression. He denies any recent hospitalizations or ER visits since his previous clinic visit.     Pertinent History:  ESRD from DM, on hemodialysis started on 10/2013  Organ: SCD, increased PHS risk donor, 54% KDPI  partial dehiscence. 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 Thymo x8 doses (3 half doses, others full dose)  2016 f/u Biopsy - no rejection   +orthostatic hypotension in hospital, d/c'd on midodrine (d/c'd 16)  8/15/16: suspicious for ACR and AMR, no AVR. Rx- SMx3 - Non HLA Ab neg  16: biopsy with ACR s/p SM pulse and PLEX x3; IVIG   Dec 2016 + SOB and dound to have RUL cavitary lung lesion, found to have kleb pneumo growing from BAL and Enterobacter septicemia now s/p over 4 weeks of IV ertapenem    Interval History: Patient last seen in transplant clinic on 2016. Since last visit he denies any hospitalizations or ER visits. He has not had any episodes of acute rejection or opportunistic infections since transplantation.  His BP usually runs between 120-125 mmHg systolic. Pt complains of SOB when he tries  "to sleep at night. Worse when he lays on his stomach and improves when he lays on his back. Improves with neb treatment. Also left foot numbness that improves once pt takes his sock off, it involves whole foot top and bottom, no radiation. He is being worked up for peripheral artery disease with his physicians in [place]. He also states that he has frequent urination at night and that he feels as though he is not completely emptying his bladder with urination. Pt states that the reason he missed last visit is that he had an outstanding balance with the hospital system and that he was not being seen because of this. Pt states that he has now arranged payment plans for his healthcare bills and now can be seen for appointment. Pt states that he has no problems getting his medications filled. Prograf level of 16 was not a true trough but the level of 13.7 was.            Medications:   Current Outpatient Prescriptions   Medication Sig Dispense Refill    albuterol-ipratropium 2.5mg-0.5mg/3mL (DUO-NEB) 0.5 mg-3 mg(2.5 mg base)/3 mL nebulizer solution Take 3 mLs by nebulization every 6 (six) hours. 120 vial 12    BD INSULIN PEN NEEDLE UF SHORT 31 gauge x 5/16" Ndle       BD INSULIN SYRINGE ULTRA-FINE 1 mL 31 gauge x 5/16 Syrg       blood sugar diagnostic Strp 1 each by Misc.(Non-Drug; Combo Route) route 3 (three) times daily. 100 each 11    blood-glucose meter kit Use as instructed 1 each 0    inhalation device (BREATHERITE VALVED MDI CHAMBER) Use as directed for inhalation. 1 Device prn    insulin NPH (NOVOLIN N) 100 unit/mL injection Take 25 units subq with breakfast and 15 units at bedtime 4 vial 3    insulin regular (NOVOLIN R) 100 unit/mL Inj injection Inject 6 units with breakfast and 8 units with dinner, subcutaneously 30 min before meal. (Patient taking differently: Inject 10 units with breakfast and 8 units with dinner, subcutaneously 30 min before meal.) 10 mL 11    insulin syringe,safetyneedle 1 mL " "31 gauge x 5/16" Syrg 1 Syringe by Misc.(Non-Drug; Combo Route) route 4 (four) times daily with meals and nightly. 120 Syringe 11    lancets Misc 1 each by Misc.(Non-Drug; Combo Route) route 3 (three) times daily. 100 each 11    methadone (DOLOPHINE) 5 MG tablet Take 5 mg by mouth 2 (two) times daily.      mycophenolate (CELLCEPT) 250 mg Cap Take 2 capsules (500 mg total) by mouth 2 (two) times daily. Z94.0 Kidney Transplant 5/21/2016. 120 capsule 11    pen needle, diabetic (EASY COMFORT PEN NEEDLES) 32 gauge x 5/32" Ndle Inject 1 each into the skin 3 (three) times daily. 100 each 11    pen needle, diabetic 31 gauge x 1/4" Ndle 1 each by Misc.(Non-Drug; Combo Route) route 4 (four) times daily. 100 each 0    predniSONE (DELTASONE) 10 MG tablet Take 10 mg by mouth once daily.      sodium bicarbonate 650 MG tablet Take 4 tablets BID  four hours before or after Harvoni 240 tablet 11    tacrolimus (PROGRAF) 1 MG Cap Take 2mg in the AM and 2mg in the PM by mouth. Z94.0 Kidney Transplant 150 capsule 11    albuterol (VENTOLIN HFA) 90 mcg/actuation inhaler Inhale 2 puffs into the lungs every 4 (four) hours as needed for Wheezing. 18 g 3    budesonide-formoterol 160-4.5 mcg (SYMBICORT) 160-4.5 mcg/actuation HFAA Inhale 2 puffs into the lungs every 12 (twelve) hours. Wash out mouth after using 1 Inhaler 12    ergocalciferol (ERGOCALCIFEROL) 50,000 unit Cap Take 1 capsule (50,000 Units total) by mouth every 7 days. Take on Mondays 4 capsule 11    multivitamin (THERAGRAN) tablet Take 1 tablet by mouth once daily. 30 tablet 11    ondansetron (ZOFRAN-ODT) 4 MG TbDL Take 1 tablet (4 mg total) by mouth every 8 (eight) hours as needed. 21 tablet 1     No current facility-administered medications for this visit.            Review of Systems  Constitutional: Negative for fever, appetite change and fatigue.   HENT: Negative for hearing loss, sore throat and mouth sores.   Eyes: Negative for photophobia, pain and " "visual disturbance.   Respiratory: Negative for cough, chest tightness. SOB as above.   Cardiovascular: Negative for chest pain, palpitations and leg swelling.   Gastrointestinal: Negative for nausea, vomiting, abdominal pain, diarrhea, constipation, blood in stool and abdominal distention.   Genitourinary: Negative for dysuria, urgency, hematuria, difficulty urinating. Frequent urination at night with small volume of urine and feels that bladder is not emptying.  Musculoskeletal: Negative for back pain, joint swelling, arthralgias and gait problem. Left foot numbness that improves when pt is not wearing sock.   Skin: Negative for pallor, rash and wound.   Neurological: Negative for dizziness, tremors, syncope, weakness, light-headedness and headaches.   Hematological: Negative for adenopathy. Does not bruise/bleed easily.   Psychiatric/Behavioral: Negative for confusion, sleep disturbance and dysphoric mood. The patient is not nervous/anxious.         Objective:     Blood pressure (!) 145/90, pulse 89, temperature 97.6 °F (36.4 °C), temperature source Oral, resp. rate 16, height 5' 11" (1.803 m), weight 98 kg (216 lb 0.8 oz), SpO2 96 %.  body mass index is 30.13 kg/m².    Physical Exam  General: No acute distress, well groomed  HEENT: Normocephalic, atraumatic, EOM's intact bilaterally, external inspection of ears and nose normal, moist mucous membranes, no oral ulcerations/lesions  Neck: Supple, symmetrical, trachea midline, no thyromegaly, no JVD  Respiratory: Clear to auscultation bilaterally, respirations unlabored, no rales/rhonchi/wheezing  Cardiovacular: Regular rate and rhythm, S1, S2 normal, no murmurs, rubs or gallops, no carotid bruit  Gastrointestinal: Soft, non-tender, bowel sounds normal, no hepatosplenomegaly  Renal allograft exam: No tenderness, no bruits, normal exam  Musculoskeletal: No knee or ankle joint tenderness or swelling.   Extremities: No clubbing or cyanosis of bilateral upper " extremities; no lower extremity edema bilaterally, radial pulses 2+ bilaterally, symmetric  Skin: warm and dry; no rash on exposed skin  Neurologic: CN grossly intact, alert and oriented x 3    Labs:  Lab Results   Component Value Date    WBC 3.51 (L) 10/09/2017    HGB 14.8 10/09/2017    HCT 47.6 10/09/2017     10/13/2017    K 4.3 10/13/2017     10/13/2017    CO2 24 10/13/2017    BUN 27 (H) 10/13/2017    CREATININE 1.8 (H) 10/13/2017    EGFRNONAA 38.9 (A) 10/13/2017    CALCIUM 9.4 10/13/2017    PHOS 3.5 10/13/2017    MG 1.7 10/09/2017    ALBUMIN 3.4 (L) 10/13/2017    AST 18 10/09/2017    ALT 15 10/09/2017    UTPCR 0.11 10/09/2017    .0 (H) 09/19/2016    TACROLIMUS 13.7 10/13/2017       Labs were reviewed with the patient.    Assessment/Plan:     1. Long-term use of immunosuppressant medication    2. Prophylactic immunotherapy    3. Type 2 diabetes mellitus with hyperglycemia, with long-term current use of insulin    4. Kidney transplant recipient    5. CKD (chronic kidney disease) stage 3, GFR 30-59 ml/min    6. Essential hypertension        Mr. Ladd is a 62 y.o. male with:       # History of ESRD presumed secondary to DM   - DDRT (Thymo x3 doses for induction, KDPI 54%, WIT 23 minutes, CIT 18 hours and 8 minutes, CMV D+/R+) on 5/21/16:   - renal allograft biopsy on 5/31/16 : ACR type 1, AVR type 2, moderate microcirculatory changes, treated with THYMO x8 (4 of them were half doses).   - repeat renal allograft biopsy on 6/21/16 which was an insufficient sample since it had no glomeruli;  - renal biopsy in August 2016: ACITVE AND CHRONIC ANTIBODY-MEDIATED  REJECTION; CHANGES SUGGESTIVE OF CALCINEURIN. Rx- SMx3 8/19-21,  Non HLA Ab neg   - renal biopsy in November 2016: EARLY TRANSPLANT GLOMERULOPATHY, SUSPICIOUS FOR ANTIBODY-MEDIATED REJECTION; and ACUTE CELLULAR REJECTION, CCTT TYPE I : s/p SM pulse and PLEX x3; IVIG  - Urine protein creatinine ratio 0.11 and Cr 1.8 which is at baseline.    #  Immunosuppression:   - Change Prograf to 2 mg BID; repeat Prograf level in one week  - continue  mg BID  - continue prednisone 10 mg daily - taper per protocol  - continue to monitor for toxicities from immunosuppressive medications     # CMV Viremia: resolved  - CMV PCR from 2/27/17 was negative    # HTN:   - BPs well controlled   - continue with home blood pressure monitoring  - low salt and healthy life discussed with the patient     # Metabolic Bone Disease/Secondary Hyperparathyroidism: last calcium/phos normal  - decrease K-phos from 500 mg BID to 250 mg BID  - Check PTH with next labs     # Metabolic acidosis:   - bicarb 24   - continue NaHCO3     # Anemia of chronic disease:   - Hb stable at 14.8  - will continue monitoring as per our center guidelines.        # DM:   - follows with endocrinologist in [place]  - continue insulin     # Chronic pain:   - pain management per his chronic pain provider       # History of COPD  - follow up with pulmonary team  - had bronchial wash with negative cytology in 2016    Danny Bui MD        I have reviewed and concur with the fellow's history, physical, assessment, and plan.  I have personally interviewed and examined the patient at bedside.     Plan:  - change prograf to 2 mg BID and check the level in a week  - check PTH and vitamin D with next lab  - clarify the dose of NaHCO3 (he does not know it and did not bring his meds at clinic)      Radha Pereyra MD

## 2017-10-16 ENCOUNTER — LAB VISIT (OUTPATIENT)
Dept: LAB | Facility: HOSPITAL | Age: 62
End: 2017-10-16
Attending: INTERNAL MEDICINE
Payer: MEDICARE

## 2017-10-16 ENCOUNTER — OFFICE VISIT (OUTPATIENT)
Dept: TRANSPLANT | Facility: CLINIC | Age: 62
End: 2017-10-16
Payer: MEDICARE

## 2017-10-16 VITALS
TEMPERATURE: 98 F | HEIGHT: 71 IN | OXYGEN SATURATION: 96 % | RESPIRATION RATE: 16 BRPM | DIASTOLIC BLOOD PRESSURE: 90 MMHG | BODY MASS INDEX: 30.25 KG/M2 | HEART RATE: 89 BPM | SYSTOLIC BLOOD PRESSURE: 145 MMHG | WEIGHT: 216.06 LBS

## 2017-10-16 DIAGNOSIS — Z86.19 HISTORY OF HEPATITIS C: ICD-10-CM

## 2017-10-16 DIAGNOSIS — N18.30 CKD (CHRONIC KIDNEY DISEASE) STAGE 3, GFR 30-59 ML/MIN: ICD-10-CM

## 2017-10-16 DIAGNOSIS — E11.65 TYPE 2 DIABETES MELLITUS WITH HYPERGLYCEMIA, WITH LONG-TERM CURRENT USE OF INSULIN: ICD-10-CM

## 2017-10-16 DIAGNOSIS — Z79.60 LONG-TERM USE OF IMMUNOSUPPRESSANT MEDICATION: Primary | ICD-10-CM

## 2017-10-16 DIAGNOSIS — Z79.4 TYPE 2 DIABETES MELLITUS WITH HYPERGLYCEMIA, WITH LONG-TERM CURRENT USE OF INSULIN: ICD-10-CM

## 2017-10-16 DIAGNOSIS — Z94.0 KIDNEY TRANSPLANT RECIPIENT: ICD-10-CM

## 2017-10-16 DIAGNOSIS — Z29.89 PROPHYLACTIC IMMUNOTHERAPY: Chronic | ICD-10-CM

## 2017-10-16 DIAGNOSIS — I10 ESSENTIAL HYPERTENSION: ICD-10-CM

## 2017-10-16 PROCEDURE — 99214 OFFICE O/P EST MOD 30 MIN: CPT | Mod: PBBFAC | Performed by: INTERNAL MEDICINE

## 2017-10-16 PROCEDURE — 99215 OFFICE O/P EST HI 40 MIN: CPT | Mod: S$PBB,,, | Performed by: INTERNAL MEDICINE

## 2017-10-16 PROCEDURE — 87522 HEPATITIS C REVRS TRNSCRPJ: CPT

## 2017-10-16 PROCEDURE — 99999 PR PBB SHADOW E&M-EST. PATIENT-LVL IV: CPT | Mod: PBBFAC,,, | Performed by: INTERNAL MEDICINE

## 2017-10-16 PROCEDURE — 36415 COLL VENOUS BLD VENIPUNCTURE: CPT

## 2017-10-16 RX ORDER — ERGOCALCIFEROL 1.25 MG/1
50000 CAPSULE ORAL
Qty: 4 CAPSULE | Refills: 11 | Status: SHIPPED | OUTPATIENT
Start: 2017-10-16 | End: 2023-01-01

## 2017-10-16 RX ORDER — METHADONE HYDROCHLORIDE 5 MG/1
5 TABLET ORAL 3 TIMES DAILY
COMMUNITY
End: 2018-05-25 | Stop reason: ALTCHOICE

## 2017-10-16 RX ORDER — TACROLIMUS 1 MG/1
CAPSULE ORAL
Qty: 150 CAPSULE | Refills: 11 | Status: SHIPPED | OUTPATIENT
Start: 2017-10-16 | End: 2018-01-03 | Stop reason: SDUPTHER

## 2017-10-16 NOTE — LETTER
October 16, 2017        Avel Estrada  [address]  [place] [address]  Phone: [phone]  Fax: [phone]             [facility]  [address]  [facility] [address]  Phone: [phone]   Patient: Lavelle Ladd   MR Number: [id number]   YOB: 1955   Date of Visit: 10/16/2017       Dear Dr. Avel Estrada    Thank you for referring Lavelle Ladd to me for evaluation. Attached you will find relevant portions of my assessment and plan of care.    If you have questions, please do not hesitate to call me. I look forward to following Lavelle Ladd along with you.    Sincerely,    Radha Pereyra MD    Enclosure    If you would like to receive this communication electronically, please contact [email or web address] or [phone] to request [facility] access.    [facility] is a tool which provides read-only access to select patient information with whom you have a relationship. Its easy to use and provides real time access to review your patients record including encounter summaries, notes, results, and demographic information.    If you feel you have received this communication in error or would no longer like to receive these types of communications, please e-mail [email or web address]

## 2017-10-17 ENCOUNTER — LAB VISIT (OUTPATIENT)
Dept: LAB | Facility: HOSPITAL | Age: 62
End: 2017-10-17
Attending: INTERNAL MEDICINE
Payer: MEDICARE

## 2017-10-17 DIAGNOSIS — Z94.0 KIDNEY REPLACED BY TRANSPLANT: ICD-10-CM

## 2017-10-17 DIAGNOSIS — Z94.0 KIDNEY REPLACED BY TRANSPLANT: Primary | ICD-10-CM

## 2017-10-17 DIAGNOSIS — E55.9 VITAMIN D DEFICIENCY: ICD-10-CM

## 2017-10-17 LAB
ALBUMIN SERPL BCP-MCNC: 3.5 G/DL
ANION GAP SERPL CALC-SCNC: 12 MMOL/L
BASOPHILS # BLD AUTO: 0.02 K/UL
BASOPHILS NFR BLD: 0.6 %
BUN SERPL-MCNC: 27 MG/DL
CALCIUM SERPL-MCNC: 9.6 MG/DL
CHLORIDE SERPL-SCNC: 104 MMOL/L
CO2 SERPL-SCNC: 24 MMOL/L
CREAT SERPL-MCNC: 1.7 MG/DL
DIFFERENTIAL METHOD: ABNORMAL
EOSINOPHIL # BLD AUTO: 0.1 K/UL
EOSINOPHIL NFR BLD: 2.8 %
ERYTHROCYTE [DISTWIDTH] IN BLOOD BY AUTOMATED COUNT: 13.9 %
EST. GFR  (AFRICAN AMERICAN): 48.2 ML/MIN/1.73 M^2
EST. GFR  (NON AFRICAN AMERICAN): 41.7 ML/MIN/1.73 M^2
GLUCOSE SERPL-MCNC: 92 MG/DL
HCT VFR BLD AUTO: 45.2 %
HGB BLD-MCNC: 14.5 G/DL
IMM GRANULOCYTES # BLD AUTO: 0.05 K/UL
IMM GRANULOCYTES NFR BLD AUTO: 1.4 %
LYMPHOCYTES # BLD AUTO: 1.3 K/UL
LYMPHOCYTES NFR BLD: 35.9 %
MAGNESIUM SERPL-MCNC: 1.7 MG/DL
MCH RBC QN AUTO: 29.6 PG
MCHC RBC AUTO-ENTMCNC: 32.1 G/DL
MCV RBC AUTO: 92 FL
MONOCYTES # BLD AUTO: 0.5 K/UL
MONOCYTES NFR BLD: 12.8 %
NEUTROPHILS # BLD AUTO: 1.6 K/UL
NEUTROPHILS NFR BLD: 46.5 %
NRBC BLD-RTO: 0 /100 WBC
PHOSPHATE SERPL-MCNC: 3.4 MG/DL
PLATELET # BLD AUTO: 207 K/UL
PMV BLD AUTO: 10.6 FL
POTASSIUM SERPL-SCNC: 4.5 MMOL/L
RBC # BLD AUTO: 4.9 M/UL
SODIUM SERPL-SCNC: 140 MMOL/L
WBC # BLD AUTO: 3.51 K/UL

## 2017-10-17 PROCEDURE — 80069 RENAL FUNCTION PANEL: CPT

## 2017-10-17 PROCEDURE — 36415 COLL VENOUS BLD VENIPUNCTURE: CPT | Mod: PO

## 2017-10-17 PROCEDURE — 83735 ASSAY OF MAGNESIUM: CPT

## 2017-10-17 PROCEDURE — 85025 COMPLETE CBC W/AUTO DIFF WBC: CPT

## 2017-10-17 PROCEDURE — 80197 ASSAY OF TACROLIMUS: CPT

## 2017-10-18 ENCOUNTER — TELEPHONE (OUTPATIENT)
Dept: TRANSPLANT | Facility: CLINIC | Age: 62
End: 2017-10-18

## 2017-10-18 LAB
HCV LOG: <1.08 LOG (10) IU/ML
HCV RNA QUANT PCR: <12 IU/ML
HCV, QUALITATIVE: NOT DETECTED IU/ML
TACROLIMUS BLD-MCNC: 9.2 NG/ML

## 2017-10-18 NOTE — TELEPHONE ENCOUNTER
----- Message from Rosita Newton RN sent at 10/16/2017  5:07 PM CDT -----  Contact: merrick jose roberto pharm/Gloria      ----- Message -----  From: Kavita Polanco  Sent: 10/16/2017   3:06 PM  To: [facility]    Needs a call right away on the [facility] script for this pt needs a new script faxed to them asap @ # [phone]

## 2017-10-19 ENCOUNTER — TELEPHONE (OUTPATIENT)
Dept: TRANSPLANT | Facility: CLINIC | Age: 62
End: 2017-10-19

## 2017-10-19 NOTE — PROGRESS NOTES
Results reviewed, and action is needed: Tacro level appears to have been collected 2-3 days after dose change (please confirm). If this is the case, let's repeat tacro level in 2 weeks (no need to repeat other labs).

## 2017-10-19 NOTE — TELEPHONE ENCOUNTER
----- Message from Tiana Blanco MD sent at 10/19/2017  4:11 PM CDT -----  Results reviewed, and action is needed: Tacro level appears to have been collected 2-3 days after dose change (please confirm). If this is the case, let's repeat tacro level in 2 weeks (no need to repeat other labs).

## 2017-10-20 ENCOUNTER — EPISODE CHANGES (OUTPATIENT)
Dept: HEPATOLOGY | Facility: CLINIC | Age: 62
End: 2017-10-20

## 2017-10-23 DIAGNOSIS — Z79.4 TYPE 2 DIABETES MELLITUS WITH HYPERGLYCEMIA, WITH LONG-TERM CURRENT USE OF INSULIN: ICD-10-CM

## 2017-10-23 DIAGNOSIS — E11.65 TYPE 2 DIABETES MELLITUS WITH HYPERGLYCEMIA, WITH LONG-TERM CURRENT USE OF INSULIN: ICD-10-CM

## 2017-10-30 ENCOUNTER — LAB VISIT (OUTPATIENT)
Dept: LAB | Facility: HOSPITAL | Age: 62
End: 2017-10-30
Attending: INTERNAL MEDICINE
Payer: MEDICARE

## 2017-10-30 DIAGNOSIS — Z94.0 KIDNEY REPLACED BY TRANSPLANT: ICD-10-CM

## 2017-10-30 DIAGNOSIS — E55.9 VITAMIN D DEFICIENCY: ICD-10-CM

## 2017-10-30 LAB
25(OH)D3+25(OH)D2 SERPL-MCNC: 20 NG/ML
PTH-INTACT SERPL-MCNC: 232 PG/ML

## 2017-10-30 PROCEDURE — 82306 VITAMIN D 25 HYDROXY: CPT

## 2017-10-30 PROCEDURE — 83970 ASSAY OF PARATHORMONE: CPT

## 2017-10-30 PROCEDURE — 36415 COLL VENOUS BLD VENIPUNCTURE: CPT | Mod: PO

## 2017-10-30 PROCEDURE — 80197 ASSAY OF TACROLIMUS: CPT

## 2017-10-31 ENCOUNTER — TELEPHONE (OUTPATIENT)
Dept: TRANSPLANT | Facility: CLINIC | Age: 62
End: 2017-10-31

## 2017-10-31 ENCOUNTER — TELEPHONE (OUTPATIENT)
Dept: NEPHROLOGY | Facility: CLINIC | Age: 62
End: 2017-10-31

## 2017-10-31 DIAGNOSIS — Z94.0 KIDNEY TRANSPLANTED: Primary | ICD-10-CM

## 2017-10-31 LAB — TACROLIMUS BLD-MCNC: 24.1 NG/ML

## 2017-10-31 NOTE — TELEPHONE ENCOUNTER
Informed patient he will need to be seen in clinic to get clearance. Patient states he will contact transplant provider.

## 2017-10-31 NOTE — PROGRESS NOTES
Results reviewed and the following message sent to patient via [facility]: Continue your vitamin D as ordered. Once it is complete, start taking daily over the counter D [id number] units daily

## 2017-10-31 NOTE — TELEPHONE ENCOUNTER
Patient call returned, request surgical clearance from transplant perspective for back surgery on Tuesday, but pt unsure of exact procedure to be performed. Patient will check with Surgeon and call back.

## 2017-10-31 NOTE — TELEPHONE ENCOUNTER
----- Message from Doris Alcocer sent at 10/31/2017  1:30 PM CDT -----  Contact: patient   Patient calling to speak with Rosita about his clearance for his back surgery.         Please call

## 2017-10-31 NOTE — TELEPHONE ENCOUNTER
----- Message from Moni Newton sent at 10/31/2017  9:51 AM CDT -----  Contact: Pt   States he needs a clearance for surgery and can be reached at [phone]//[email or web address]

## 2017-11-01 ENCOUNTER — LAB VISIT (OUTPATIENT)
Dept: LAB | Facility: HOSPITAL | Age: 62
End: 2017-11-01
Attending: INTERNAL MEDICINE
Payer: MEDICARE

## 2017-11-01 DIAGNOSIS — Z94.0 KIDNEY TRANSPLANTED: ICD-10-CM

## 2017-11-01 PROCEDURE — 36415 COLL VENOUS BLD VENIPUNCTURE: CPT | Mod: PO

## 2017-11-01 PROCEDURE — 80197 ASSAY OF TACROLIMUS: CPT

## 2017-11-02 LAB — TACROLIMUS BLD-MCNC: 4.9 NG/ML

## 2017-11-02 NOTE — PROGRESS NOTES
Subjective:      Patient ID: Lavelle Ladd is a 62 y.o. male.    PCP: Rishabh Esteban MD      Lavelle Ladd is a pleasant 62 y.o. male presenting to follow up on diabetes mellitus. He has had diabetes for 8 or more years. His last visit in Diabetes Management was 03/14/2017. Since that time he has had mild improvement in his glycemia.  He states that his blood sugar has been adequately controlled with minimal improvement since his last visit.  His current A1c is documented to be 9.0 down from 10.5.  His more expedient reason for being here today is to see if [facility] would be able to intervene and take over his case of lower back pain.  He has been denied surgery on his lower back as recommended by his neurosurgeon because he has an open lawsuit with [employer] where he fell.  His blood sugar range fasting has been 180-200 and 2 hour post meal has been 200+, and he has been monitoring 0-2 times per day as directed. His current concerns are lower back pain and glycemic control.    He denies any hospital admissions, emergency room visits, hypoglycemia, syncope, diaphoresis, chest pain, or dyspnea.    He has maintained 216 pounds since last visit. His BMI is 30.13      His blood sugar in the clinic today was:   Lab Results   Component Value Date    POCGLU 140 (A) 11/03/2017       We discussed the American diabetes Association recommendations:  hemoglobin A1c below 7.0%; all diabetics should be on statins unless contraindicated; one aspirin daily unless contraindicated; fasting blood sugar between 80 and 130 mg/dL; postprandial blood sugar below 180 mg/dl; prevention of hypoglycemia, may adjust goals to higher levels if persistent; ACE or ARB therapy if not contraindicated; and maintain in an ideal body weight with BMI below 25.    Lavelle is noncompliant some of the time with DM medications.     Lavelle is noncompliant some of the time with lifestyle modifications to include activity and meal planning.        STANDARDS OF CARE:  Eye doctor: Dr. AUREA Malhotra, last exam 3/13/2017.  Dental exam: Recommend regular exams; denies gums bleeding.  Podiatry doctor:     ACTIVITY LEVEL: He exercises never.  MEAL PLANNING: Number of meals per day - 3. Number of snacks per day - 2.  Per dietary recall, patient states he is now limiting carbohydrates, saturated fats and sodium. This is working well for him and he is in the early stages and still learning. But his last 7 days blood sugar log is showing a decline and better glycemic control.  BLOOD GLUCOSE TESTING: Self-monitoring with One [employer]  SOCIAL HISTORY: single. Lives alone. on permanent disability no tobacco use    The following results were reviewed with patient.    Lab Results   Component Value Date    WBC 3.51 (L) 10/17/2017    HGB 14.5 10/17/2017    HCT 45.2 10/17/2017     10/17/2017    CHOL 132 09/09/2016    TRIG 130 09/09/2016    HDL 48 09/09/2016    ALT 15 10/09/2017    AST 18 10/09/2017     10/17/2017    K 4.5 10/17/2017     10/17/2017    CREATININE 1.7 (H) 10/17/2017    ESTGFRAFRICA 48.2 (A) 10/17/2017    EGFRNONAA 41.7 (A) 10/17/2017    BUN 27 (H) 10/17/2017    CO2 24 10/17/2017    TSH 1.329 05/08/2017    PSA 0.29 08/25/2015    INR 1.0 06/15/2017    GLU 92 10/17/2017       Lab Results   Component Value Date    HGBA1C 9.0 (H) 03/20/2017    HGBA1C 10.5 (H) 12/01/2016    HGBA1C 11.4 (H) 09/24/2016       Lab Results   Component Value Date    GLUTAMICACID 0.00 03/20/2017    CPEPTIDE 3.5 03/20/2017     Lab Results   Component Value Date    TSH 1.329 05/08/2017     Lab Results   Component Value Date    IRON 41 (L) 10/30/2013    TIBC 306 10/30/2013    FERRITIN 132 02/26/2013     Lab Results   Component Value Date    KVOOWOWA69 192 (L) 02/26/2013     Lab Results   Component Value Date    .0 (H) 10/30/2017    CALCIUM 9.6 10/17/2017    CAION 1.10 05/30/2016    PHOS 3.4 10/17/2017       Review of patient's allergies indicates:   Allergen  Reactions    Tylenol [acetaminophen]      Told not to take per Transplant Team       Past Medical History:   Diagnosis Date    DINORAH (acute kidney injury) 2016    Arthritis     CHF (congestive heart failure)     Chronic obstructive pulmonary disease 2016    Coronary artery disease involving native coronary artery of native heart without angina pectoris 2016     donor kidney transplant for DM 16     Induction with Thymo x3 and IV solumedrol to total 875mg  Kidney Biopsy  2016: 16 glomeruli, ACR type 1 AVR type 2, significant microcirculatory changes, c4d negative, No DSA, 5 to10% fibrosis. Treated with thymo x8 2016- no rejection      Diastolic heart failure     DM2 (diabetes mellitus, type 2)     Encounter for blood transfusion     ESRD on RRT since 10/2013 10/29/2013    Biopsy proven diabetic nephropathy and lymphoplasmacytic interstitial infiltrate not c/w with AIN (ddx sjogrens or assoc with tamm-horsefall protein extravasation)     GERD (gastroesophageal reflux disease)     History of hepatitis C, s/p successful Rx w/ SVR12 - 2017    Completed 12 weeks harvoni w/ SVR    Hyperlipidemia     Hypertension     Prophylactic immunotherapy     Proteinuria     PVD (peripheral vascular disease) 2017    RLE BKA CT 16 Extensive atherosclerotic disease of the aorta and mesenteric arteries.     Reactive airway disease     Renal hypertension     Type 2 diabetes mellitus with diabetic neuropathy, with long-term current use of insulin 2016    Type 2 diabetes mellitus with hyperglycemia, with long-term current use of insulin     Type 2 diabetes mellitus with retinopathy, with long-term current use of insulin 2016    Vitamin B12 deficiency        Review of Systems   Constitutional: Negative.  Negative for activity change, appetite change, chills, diaphoresis, fatigue, fever and unexpected weight change.   HENT: Negative.  Negative for dental  "problem, facial swelling, hearing loss, nosebleeds, trouble swallowing and voice change.    Eyes: Negative.  Negative for photophobia, pain, discharge, redness, itching and visual disturbance.   Respiratory: Negative.  Negative for cough, choking, chest tightness, shortness of breath and wheezing.    Cardiovascular: Negative.  Negative for chest pain, palpitations and leg swelling.   Gastrointestinal: Negative.  Negative for abdominal distention, abdominal pain, blood in stool, constipation, diarrhea, nausea and vomiting.   Endocrine: Negative.  Negative for cold intolerance, heat intolerance, polydipsia, polyphagia and polyuria.   Genitourinary: Negative.  Negative for decreased urine volume, difficulty urinating, dysuria, frequency, scrotal swelling, testicular pain and urgency.   Musculoskeletal: Negative.  Negative for arthralgias, back pain, gait problem, joint swelling, myalgias, neck pain and neck stiffness.   Skin: Negative.  Negative for color change, pallor, rash and wound.   Allergic/Immunologic: Negative.  Negative for environmental allergies, food allergies and immunocompromised state.   Neurological: Negative.  Negative for dizziness, tremors, seizures, syncope, facial asymmetry, speech difficulty, weakness, light-headedness, numbness and headaches.   Hematological: Negative.  Negative for adenopathy. Does not bruise/bleed easily.   Psychiatric/Behavioral: Negative.  Negative for agitation, behavioral problems, confusion, decreased concentration, dysphoric mood, hallucinations, self-injury, sleep disturbance and suicidal ideas. The patient is not nervous/anxious and is not hyperactive.       Objective:     Vitals - 1 value per visit 9/19/2017 10/16/2017 11/3/2017   SYSTOLIC 132 145 136   DIASTOLIC 94 90 82   PULSE 77 89 -   TEMPERATURE - 97.6 -   RESPIRATIONS - 16 -   SPO2 98 96 -   Weight (lb) 213.85 216.05 216.05   Weight (kg) 97 98 98   HEIGHT 5' 10" 5' 11" 5' 11"   BODY MASS INDEX 30.68 30.13 30.13 "   VISIT REPORT - - -   Pain Score  - 0 4   Some recent data might be hidden       Physical Exam   Constitutional: He is oriented to person, place, and time. He appears well-developed and well-nourished. He is cooperative.  Non-toxic appearance. He does not have a sickly appearance. He does not appear ill. No distress. He is not intubated.   HENT:   Head: Normocephalic and atraumatic. Not macrocephalic and not microcephalic. Head is without raccoon's eyes, without Li's sign, without abrasion, without contusion, without laceration, without right periorbital erythema and without left periorbital erythema. Hair is normal.   Right Ear: External ear normal. No lacerations. No drainage, swelling or tenderness. No foreign bodies. No mastoid tenderness. Tympanic membrane is not injected, not scarred, not perforated, not erythematous, not retracted and not bulging. Tympanic membrane mobility is normal. No middle ear effusion. No hemotympanum. No decreased hearing is noted.   Left Ear: External ear normal. No lacerations. No drainage, swelling or tenderness. No foreign bodies. No mastoid tenderness. Tympanic membrane is not injected, not scarred, not perforated, not erythematous, not retracted and not bulging. Tympanic membrane mobility is normal.  No middle ear effusion. No hemotympanum. No decreased hearing is noted.   Nose: Nose normal.   Mouth/Throat: Oropharynx is clear and moist.   Eyes: EOM and lids are normal. Pupils are equal, round, and reactive to light. Right eye exhibits no chemosis, no discharge, no exudate and no hordeolum. No foreign body present in the right eye. Left eye exhibits no chemosis, no discharge, no exudate and no hordeolum. No foreign body present in the left eye. Right conjunctiva is not injected. Right conjunctiva has no hemorrhage. Left conjunctiva is not injected. Left conjunctiva has no hemorrhage. No scleral icterus. Right eye exhibits normal extraocular motion and no nystagmus. Left eye  exhibits normal extraocular motion and no nystagmus. Right pupil is round and reactive. Left pupil is round and reactive. Pupils are equal.   Neck: Normal range of motion, full passive range of motion without pain and phonation normal. Neck supple. Normal carotid pulses, no hepatojugular reflux and no JVD present. No tracheal tenderness, no spinous process tenderness and no muscular tenderness present. Carotid bruit is not present. No neck rigidity. No tracheal deviation, no edema, no erythema and normal range of motion present. No thyroid mass and no thyromegaly present.   Cardiovascular: Normal rate, regular rhythm, normal heart sounds and intact distal pulses.   No extrasystoles are present. PMI is not displaced.  Exam reveals no gallop, no friction rub and no decreased pulses.    No murmur heard.  Pulses:       Carotid pulses are 2+ on the right side, and 2+ on the left side.       Radial pulses are 2+ on the right side, and 2+ on the left side.        Dorsalis pedis pulses are 2+ on the right side, and 2+ on the left side.        Posterior tibial pulses are 2+ on the right side, and 2+ on the left side.   Pulmonary/Chest: Effort normal and breath sounds normal. No accessory muscle usage or stridor. No apnea, no tachypnea and no bradypnea. He is not intubated. No respiratory distress. He has no decreased breath sounds. He has no wheezes. He has no rhonchi. He has no rales. He exhibits no tenderness.   Abdominal: Soft. Bowel sounds are normal. He exhibits no shifting dullness, no distension, no pulsatile liver, no fluid wave, no abdominal bruit, no ascites, no pulsatile midline mass and no mass. There is no hepatosplenomegaly, splenomegaly or hepatomegaly. There is no tenderness. There is no rigidity, no rebound, no guarding, no CVA tenderness and no tenderness at McBurney's point. No hernia. Hernia confirmed negative in the ventral area.   Musculoskeletal: Normal range of motion. He exhibits no edema or  tenderness.        Right foot: There is normal range of motion and no deformity.        Left foot: There is normal range of motion and no deformity.   Feet:   Right Foot:   Protective Sensation: 6 sites tested. 6 sites sensed.   Skin Integrity: Negative for ulcer, blister, skin breakdown, erythema, warmth, callus or dry skin.   Left Foot:   Protective Sensation: 6 sites tested. 6 sites sensed.   Skin Integrity: Negative for ulcer, blister, skin breakdown, erythema, warmth, callus or dry skin.   Lymphadenopathy:        Head (right side): No submental, no submandibular, no tonsillar, no preauricular, no posterior auricular and no occipital adenopathy present.        Head (left side): No submental, no submandibular, no tonsillar, no preauricular, no posterior auricular and no occipital adenopathy present.     He has no cervical adenopathy.        Right cervical: No superficial cervical, no deep cervical and no posterior cervical adenopathy present.       Left cervical: No superficial cervical, no deep cervical and no posterior cervical adenopathy present.   Neurological: He is alert and oriented to person, place, and time. He has normal reflexes. He displays no atrophy and no tremor. No cranial nerve deficit or sensory deficit. He exhibits normal muscle tone. He displays no seizure activity. Coordination and gait normal.   Reflex Scores:       Bicep reflexes are 2+ on the right side and 2+ on the left side.       Brachioradialis reflexes are 2+ on the right side and 2+ on the left side.       Patellar reflexes are 2+ on the right side and 2+ on the left side.  Skin: Skin is warm and dry. No rash noted. No erythema. No pallor.   Psychiatric: He has a normal mood and affect. His mood appears not anxious. His affect is not angry, not blunt, not labile and not inappropriate. His speech is not rapid and/or pressured, not delayed, not tangential and not slurred. He is not agitated, not aggressive, not hyperactive, not  slowed, not withdrawn, not actively hallucinating and not combative. Thought content is not paranoid and not delusional. Cognition and memory are not impaired. He does not express impulsivity or inappropriate judgment. He does not exhibit a depressed mood. He expresses no homicidal and no suicidal ideation. He expresses no suicidal plans and no homicidal plans. He is communicative. He exhibits normal recent memory and normal remote memory. He is attentive.     Assessment:     1. Type 2 diabetes mellitus with hyperglycemia, with long-term current use of insulin       Plan:     Lavelle Ladd is seen today for   1. Type 2 diabetes mellitus with hyperglycemia, with long-term current use of insulin    2. Spinal stenosis of lumbar region with neurogenic claudication      We have discussed the etiology and treatment options associated with the diagnosis as well as alternatives. He has elected the following treatments.     Type 2 diabetes mellitus with hyperglycemia, with long-term current use of insulin  -     POCT glucose  -     Hemoglobin A1c; Future; Expected date: 11/03/2017    Spinal stenosis of lumbar region with neurogenic claudication  -     Ambulatory referral to Neurology      1.) Patient was instructed to monitor blood glucose twice daily, fasting, and 2 hour post meal; if on Multiple Daily Injections (MDI) he will need to have pre-meal blood glucose as well. Reminded to bring BG meter or record to each visit for review.  2.) Reviewed pathophysiology of diabetes, complications related to the disease, importance of annual dilated eye exam and self daily foot examination.  3.) Continue medications as prescribed Novolin R and Novolin N. [facility] or Phone review in 1 week with [facility] records for adjustment of medication.  4.) Advised patient to continue to follow up with Dietician/CDE as directed.  5.) Discussed activity, benefits, methods, and precautions. Recommended patient start/continue some form of  exercise and increase as tolerated to 60 minutes per day to facilitate weight loss and aid in control of BGs. Also reminded patient of WHO recommendation of 10,000 steps daily as a goal.   6.) A1C, TSH, Lipid Panel, CMP/renal panel with eGFR and Micro/Creatinine.  7.) Return to clinic in 3 months for follow up. Advised patient to call clinic with any questions or concerns.    A total of 40 minutes was spent in face to face time, of which 50 % was spent in counseling patient on disease process, complications, treatment, and side effects of medications.    The patient was explained the above plan and given opportunity to ask questions.  He understands, chooses and consents to this plan and accepts all the risks, which include but are not limited to the risks mentioned above.   He understands the alternative of having no testing, interventions or treatments at this time. He left content and without further questions.

## 2017-11-03 ENCOUNTER — LAB VISIT (OUTPATIENT)
Dept: LAB | Facility: HOSPITAL | Age: 62
End: 2017-11-03
Attending: PHYSICIAN ASSISTANT
Payer: MEDICARE

## 2017-11-03 ENCOUNTER — OFFICE VISIT (OUTPATIENT)
Dept: DIABETES | Facility: CLINIC | Age: 62
End: 2017-11-03
Payer: MEDICARE

## 2017-11-03 VITALS
WEIGHT: 216.06 LBS | HEIGHT: 71 IN | BODY MASS INDEX: 30.25 KG/M2 | DIASTOLIC BLOOD PRESSURE: 82 MMHG | SYSTOLIC BLOOD PRESSURE: 136 MMHG

## 2017-11-03 DIAGNOSIS — Z79.4 TYPE 2 DIABETES MELLITUS WITH HYPERGLYCEMIA, WITH LONG-TERM CURRENT USE OF INSULIN: Primary | ICD-10-CM

## 2017-11-03 DIAGNOSIS — M48.062 SPINAL STENOSIS OF LUMBAR REGION WITH NEUROGENIC CLAUDICATION: ICD-10-CM

## 2017-11-03 DIAGNOSIS — E11.65 TYPE 2 DIABETES MELLITUS WITH HYPERGLYCEMIA, WITH LONG-TERM CURRENT USE OF INSULIN: ICD-10-CM

## 2017-11-03 DIAGNOSIS — Z79.4 TYPE 2 DIABETES MELLITUS WITH HYPERGLYCEMIA, WITH LONG-TERM CURRENT USE OF INSULIN: ICD-10-CM

## 2017-11-03 DIAGNOSIS — E11.65 TYPE 2 DIABETES MELLITUS WITH HYPERGLYCEMIA, WITH LONG-TERM CURRENT USE OF INSULIN: Primary | ICD-10-CM

## 2017-11-03 LAB
ESTIMATED AVG GLUCOSE: 157 MG/DL
GLUCOSE SERPL-MCNC: 140 MG/DL (ref 70–110)
HBA1C MFR BLD HPLC: 7.1 %

## 2017-11-03 PROCEDURE — 82948 REAGENT STRIP/BLOOD GLUCOSE: CPT | Mod: PBBFAC,PO | Performed by: PHYSICIAN ASSISTANT

## 2017-11-03 PROCEDURE — 99214 OFFICE O/P EST MOD 30 MIN: CPT | Mod: S$PBB,,, | Performed by: PHYSICIAN ASSISTANT

## 2017-11-03 PROCEDURE — 83036 HEMOGLOBIN GLYCOSYLATED A1C: CPT

## 2017-11-03 PROCEDURE — 36415 COLL VENOUS BLD VENIPUNCTURE: CPT | Mod: PO

## 2017-11-03 PROCEDURE — 99999 PR PBB SHADOW E&M-EST. PATIENT-LVL IV: CPT | Mod: PBBFAC,,, | Performed by: PHYSICIAN ASSISTANT

## 2017-11-03 PROCEDURE — 99214 OFFICE O/P EST MOD 30 MIN: CPT | Mod: PBBFAC,PO | Performed by: PHYSICIAN ASSISTANT

## 2017-11-03 RX ORDER — OXYCODONE HCL 10 MG/1
10 TABLET, FILM COATED, EXTENDED RELEASE ORAL EVERY 12 HOURS PRN
COMMUNITY
End: 2018-01-03 | Stop reason: ALTCHOICE

## 2017-11-07 ENCOUNTER — TELEPHONE (OUTPATIENT)
Dept: TRANSPLANT | Facility: CLINIC | Age: 62
End: 2017-11-07

## 2017-11-22 ENCOUNTER — OFFICE VISIT (OUTPATIENT)
Dept: CARDIOLOGY | Facility: CLINIC | Age: 62
End: 2017-11-22
Payer: MEDICARE

## 2017-11-22 VITALS
DIASTOLIC BLOOD PRESSURE: 72 MMHG | HEIGHT: 71 IN | BODY MASS INDEX: 30.15 KG/M2 | HEART RATE: 74 BPM | WEIGHT: 215.38 LBS | SYSTOLIC BLOOD PRESSURE: 120 MMHG

## 2017-11-22 DIAGNOSIS — K21.9 GASTROESOPHAGEAL REFLUX DISEASE, ESOPHAGITIS PRESENCE NOT SPECIFIED: Primary | ICD-10-CM

## 2017-11-22 DIAGNOSIS — I25.10 CORONARY ARTERY DISEASE INVOLVING NATIVE CORONARY ARTERY OF NATIVE HEART WITHOUT ANGINA PECTORIS: ICD-10-CM

## 2017-11-22 DIAGNOSIS — E78.2 MIXED HYPERLIPIDEMIA: ICD-10-CM

## 2017-11-22 DIAGNOSIS — I12.9 RENAL HYPERTENSION: Chronic | ICD-10-CM

## 2017-11-22 DIAGNOSIS — I10 ESSENTIAL HYPERTENSION: ICD-10-CM

## 2017-11-22 DIAGNOSIS — I73.9 PVD (PERIPHERAL VASCULAR DISEASE): Chronic | ICD-10-CM

## 2017-11-22 PROCEDURE — 99213 OFFICE O/P EST LOW 20 MIN: CPT | Mod: PBBFAC | Performed by: INTERNAL MEDICINE

## 2017-11-22 PROCEDURE — 99214 OFFICE O/P EST MOD 30 MIN: CPT | Mod: S$PBB,,, | Performed by: INTERNAL MEDICINE

## 2017-11-22 PROCEDURE — 99999 PR PBB SHADOW E&M-EST. PATIENT-LVL III: CPT | Mod: PBBFAC,,, | Performed by: INTERNAL MEDICINE

## 2017-11-22 RX ORDER — ASPIRIN 81 MG/1
81 TABLET ORAL DAILY
Refills: 0 | COMMUNITY
Start: 2017-11-22 | End: 2020-04-22 | Stop reason: SDUPTHER

## 2017-11-22 NOTE — PROGRESS NOTES
Subjective:   Patient ID:  Lavelle Ladd is a 62 y.o. male who presents for follow-up of Hypertension and Coronary Artery Disease  Radiology arterial us was wnl of LLE. Pt seeing neurosurgery of neuropathy/pain of LLE.  Patient denies CP, angina or anginal equivalent.Echo-nml 1 year ago, NMT (-) 2 years ago  Coronary Artery Disease   Presents for follow-up visit. Pertinent negatives include no chest pain, chest pressure, chest tightness, dizziness, leg swelling, muscle weakness, palpitations, shortness of breath or weight gain. Risk factors include hyperlipidemia. The symptoms have been stable. Compliance with diet is variable. Compliance with exercise is variable. Compliance with medications is good.   Hyperlipidemia   This is a chronic problem. The current episode started more than 1 year ago. Recent lipid tests were reviewed and are variable. Pertinent negatives include no chest pain or shortness of breath. He is currently on no antihyperlipidemic treatment. The current treatment provides mild improvement of lipids. Compliance problems include medication side effects.        Review of Systems   Constitution: Negative. Negative for weight gain.   HENT: Negative.    Eyes: Negative.    Cardiovascular: Negative.  Negative for chest pain, leg swelling and palpitations.   Respiratory: Negative.  Negative for chest tightness and shortness of breath.    Endocrine: Negative.    Hematologic/Lymphatic: Negative.    Skin: Negative.    Musculoskeletal: Negative for muscle weakness.   Gastrointestinal: Negative.    Genitourinary: Negative.    Neurological: Negative.  Negative for dizziness.   Psychiatric/Behavioral: Negative.    Allergic/Immunologic: Negative.      Family History   Problem Relation Age of Onset    Cancer Father     Diabetes Father     Heart failure Father     Stroke Father     Heart failure Mother     Kidney disease Neg Hx      Past Medical History:   Diagnosis Date    DINORAH (acute kidney injury)  2016    Arthritis     CHF (congestive heart failure)     Chronic obstructive pulmonary disease 2016    Coronary artery disease involving native coronary artery of native heart without angina pectoris 2016     donor kidney transplant for DM 16     Induction with Thymo x3 and IV solumedrol to total 875mg  Kidney Biopsy  2016: 16 glomeruli, ACR type 1 AVR type 2, significant microcirculatory changes, c4d negative, No DSA, 5 to10% fibrosis. Treated with thymo x8 2016- no rejection      Diastolic heart failure     DM2 (diabetes mellitus, type 2)     Encounter for blood transfusion     ESRD on RRT since 10/2013 10/29/2013    Biopsy proven diabetic nephropathy and lymphoplasmacytic interstitial infiltrate not c/w with AIN (ddx sjogrens or assoc with tamm-horsefall protein extravasation)     GERD (gastroesophageal reflux disease)     History of hepatitis C, s/p successful Rx w/ SVR12 - 2017    Completed 12 weeks harvoni w/ SVR    Hyperlipidemia     Hypertension     Prophylactic immunotherapy     Proteinuria     PVD (peripheral vascular disease) 2017    RLE BKA CT 16 Extensive atherosclerotic disease of the aorta and mesenteric arteries.     Reactive airway disease     Renal hypertension     Type 2 diabetes mellitus with diabetic neuropathy, with long-term current use of insulin 2016    Type 2 diabetes mellitus with hyperglycemia, with long-term current use of insulin     Type 2 diabetes mellitus with retinopathy, with long-term current use of insulin 2016    Vitamin B12 deficiency      Current Outpatient Prescriptions on File Prior to Visit   Medication Sig Dispense Refill    ergocalciferol (ERGOCALCIFEROL) 50,000 unit Cap Take 1 capsule (50,000 Units total) by mouth every 7 days. Take on  4 capsule 11    insulin regular (NOVOLIN R) 100 unit/mL Inj injection Inject 6 units with breakfast and 8 units with dinner,  "subcutaneously 30 min before meal. (Patient taking differently: Inject 10 units with breakfast and 8 units with dinner, subcutaneously 30 min before meal.) 10 mL 11    insulin syringe,safetyneedle 1 mL 31 gauge x 5/16" Syrg 1 Syringe by Misc.(Non-Drug; Combo Route) route 4 (four) times daily with meals and nightly. 120 Syringe 11    lancets Misc 1 each by Misc.(Non-Drug; Combo Route) route 3 (three) times daily. 100 each 11    methadone (DOLOPHINE) 5 MG tablet Take 5 mg by mouth 2 (two) times daily.      mycophenolate (CELLCEPT) 250 mg Cap Take 2 capsules (500 mg total) by mouth 2 (two) times daily. Z94.0 Kidney Transplant 5/21/2016. 120 capsule 11    ondansetron (ZOFRAN-ODT) 4 MG TbDL Take 1 tablet (4 mg total) by mouth every 8 (eight) hours as needed. 21 tablet 1    oxyCODONE (OXYCONTIN) 10 mg 12 hr tablet Take 10 mg by mouth every 12 (twelve) hours as needed for Pain.      pen needle, diabetic (EASY COMFORT PEN NEEDLES) 32 gauge x 5/32" Ndle Inject 1 each into the skin 3 (three) times daily. 100 each 11    pen needle, diabetic 31 gauge x 1/4" Ndle 1 each by Misc.(Non-Drug; Combo Route) route 4 (four) times daily. 100 each 0    predniSONE (DELTASONE) 10 MG tablet Take 10 mg by mouth once daily.      sodium bicarbonate 650 MG tablet Take 4 tablets BID  four hours before or after Harvoni 240 tablet 11    tacrolimus (PROGRAF) 1 MG Cap Take 2mg in the AM and 2mg in the PM by mouth. Z94.0 Kidney Transplant 150 capsule 11    BD INSULIN PEN NEEDLE UF SHORT 31 gauge x 5/16" Ndle       BD INSULIN SYRINGE ULTRA-FINE 1 mL 31 gauge x 5/16 Syrg       blood sugar diagnostic Strp 1 each by Misc.(Non-Drug; Combo Route) route 3 (three) times daily. 100 each 11    blood-glucose meter kit Use as instructed 1 each 0    budesonide-formoterol 160-4.5 mcg (SYMBICORT) 160-4.5 mcg/actuation HFAA Inhale 2 puffs into the lungs every 12 (twelve) hours. Wash out mouth after using 1 Inhaler 12    inhalation device " (BREATHERITE VALVED MDI CHAMBER) Use as directed for inhalation. 1 Device prn    insulin NPH (NOVOLIN N) 100 unit/mL injection Take 25 units subq with breakfast and 15 units at bedtime 4 vial 0     No current facility-administered medications on file prior to visit.      Review of patient's allergies indicates:   Allergen Reactions    Tylenol [acetaminophen]      Told not to take per Transplant Team       Objective:     Physical Exam   Constitutional: He is oriented to person, place, and time. He appears well-developed and well-nourished.   HENT:   Head: Normocephalic and atraumatic.   Eyes: Conjunctivae are normal. Pupils are equal, round, and reactive to light.   Neck: Normal range of motion. Neck supple.   Cardiovascular: Normal rate, regular rhythm, normal heart sounds and intact distal pulses.    Pulmonary/Chest: Effort normal and breath sounds normal.   Abdominal: Soft. Bowel sounds are normal.   Neurological: He is alert and oriented to person, place, and time.   Skin: Skin is warm and dry.   Psychiatric: He has a normal mood and affect.   Nursing note and vitals reviewed.      Assessment:     1. Gastroesophageal reflux disease, esophagitis presence not specified    2. Essential hypertension    3. Coronary artery disease involving native coronary artery of native heart without angina pectoris    4. Mixed hyperlipidemia    5. Renal hypertension    6. PVD (peripheral vascular disease)        Plan:     Gastroesophageal reflux disease, esophagitis presence not specified    Essential hypertension    Coronary artery disease involving native coronary artery of native heart without angina pectoris    Mixed hyperlipidemia    Renal hypertension    PVD (peripheral vascular disease)      Aspirin - cad  Check lipids -note hx of liver ds

## 2017-11-22 NOTE — LETTER
November 25, 2017      Colin Garcia MD  [address] [facility] [address]           [facility]  [address] [place] [address]  Phone: [phone]  Fax: [phone]          Patient: Lavelle Ladd   MR Number: [id number]   YOB: 1955   Date of Visit: 11/22/2017       Dear Dr. Colin Garcia:    Thank you for referring Lavelle Ladd to me for evaluation. Attached you will find relevant portions of my assessment and plan of care.    If you have questions, please do not hesitate to call me. I look forward to following Lavelle Ladd along with you.    Sincerely,    Umang Hannah MD    Enclosure  CC:  No Recipients    If you would like to receive this communication electronically, please contact [email or web address] or [phone] to request more information on [employer] Link access.    For providers and/or their staff who would like to refer a patient to [facility], please contact us through our one-stop-shop provider referral line, [facility], at [phone].    If you feel you have received this communication in error or would no longer like to receive these types of communications, please e-mail [email or web address]

## 2017-11-30 RX ORDER — PREDNISONE 5 MG/1
5 TABLET ORAL DAILY
Qty: 30 TABLET | Refills: 11 | Status: SHIPPED | OUTPATIENT
Start: 2017-11-30 | End: 2018-01-03 | Stop reason: SDUPTHER

## 2017-12-04 ENCOUNTER — LAB VISIT (OUTPATIENT)
Dept: LAB | Facility: HOSPITAL | Age: 62
End: 2017-12-04
Attending: INTERNAL MEDICINE
Payer: MEDICARE

## 2017-12-04 DIAGNOSIS — I25.10 CORONARY ARTERY DISEASE INVOLVING NATIVE CORONARY ARTERY OF NATIVE HEART WITHOUT ANGINA PECTORIS: ICD-10-CM

## 2017-12-04 DIAGNOSIS — Z94.0 KIDNEY REPLACED BY TRANSPLANT: ICD-10-CM

## 2017-12-04 LAB
ALBUMIN SERPL BCP-MCNC: 3.4 G/DL
ALBUMIN SERPL BCP-MCNC: 3.4 G/DL
ALP SERPL-CCNC: 94 U/L
ALT SERPL W/O P-5'-P-CCNC: 12 U/L
ANION GAP SERPL CALC-SCNC: 12 MMOL/L
AST SERPL-CCNC: 13 U/L
BASOPHILS # BLD AUTO: 0.02 K/UL
BASOPHILS NFR BLD: 0.5 %
BILIRUB DIRECT SERPL-MCNC: 0.2 MG/DL
BILIRUB SERPL-MCNC: 0.5 MG/DL
BUN SERPL-MCNC: 25 MG/DL
CALCIUM SERPL-MCNC: 9.8 MG/DL
CHLORIDE SERPL-SCNC: 104 MMOL/L
CHOLEST SERPL-MCNC: 192 MG/DL
CHOLEST/HDLC SERPL: 3.9 {RATIO}
CO2 SERPL-SCNC: 26 MMOL/L
CREAT SERPL-MCNC: 1.7 MG/DL
DIFFERENTIAL METHOD: ABNORMAL
EOSINOPHIL # BLD AUTO: 0.1 K/UL
EOSINOPHIL NFR BLD: 2.9 %
ERYTHROCYTE [DISTWIDTH] IN BLOOD BY AUTOMATED COUNT: 13.6 %
EST. GFR  (AFRICAN AMERICAN): 48.2 ML/MIN/1.73 M^2
EST. GFR  (NON AFRICAN AMERICAN): 41.7 ML/MIN/1.73 M^2
GLUCOSE SERPL-MCNC: 134 MG/DL
HCT VFR BLD AUTO: 44.1 %
HDLC SERPL-MCNC: 49 MG/DL
HDLC SERPL: 25.5 %
HGB BLD-MCNC: 14 G/DL
IMM GRANULOCYTES # BLD AUTO: 0.03 K/UL
IMM GRANULOCYTES NFR BLD AUTO: 0.8 %
LDLC SERPL CALC-MCNC: 123.2 MG/DL
LYMPHOCYTES # BLD AUTO: 1.1 K/UL
LYMPHOCYTES NFR BLD: 30.2 %
MAGNESIUM SERPL-MCNC: 1.8 MG/DL
MCH RBC QN AUTO: 29 PG
MCHC RBC AUTO-ENTMCNC: 31.7 G/DL
MCV RBC AUTO: 91 FL
MONOCYTES # BLD AUTO: 0.4 K/UL
MONOCYTES NFR BLD: 11.6 %
NEUTROPHILS # BLD AUTO: 2 K/UL
NEUTROPHILS NFR BLD: 54 %
NONHDLC SERPL-MCNC: 143 MG/DL
NRBC BLD-RTO: 0 /100 WBC
PHOSPHATE SERPL-MCNC: 2.8 MG/DL
PLATELET # BLD AUTO: 222 K/UL
PMV BLD AUTO: 10.1 FL
POTASSIUM SERPL-SCNC: 5 MMOL/L
PROT SERPL-MCNC: 7 G/DL
RBC # BLD AUTO: 4.83 M/UL
SODIUM SERPL-SCNC: 142 MMOL/L
TRIGL SERPL-MCNC: 99 MG/DL
WBC # BLD AUTO: 3.78 K/UL

## 2017-12-04 PROCEDURE — 82247 BILIRUBIN TOTAL: CPT

## 2017-12-04 PROCEDURE — 87799 DETECT AGENT NOS DNA QUANT: CPT

## 2017-12-04 PROCEDURE — 84075 ASSAY ALKALINE PHOSPHATASE: CPT

## 2017-12-04 PROCEDURE — 36415 COLL VENOUS BLD VENIPUNCTURE: CPT | Mod: PO

## 2017-12-04 PROCEDURE — 80069 RENAL FUNCTION PANEL: CPT

## 2017-12-04 PROCEDURE — 80197 ASSAY OF TACROLIMUS: CPT

## 2017-12-04 PROCEDURE — 80061 LIPID PANEL: CPT

## 2017-12-04 PROCEDURE — 85025 COMPLETE CBC W/AUTO DIFF WBC: CPT

## 2017-12-04 PROCEDURE — 83735 ASSAY OF MAGNESIUM: CPT

## 2017-12-05 ENCOUNTER — TELEPHONE (OUTPATIENT)
Dept: CARDIOLOGY | Facility: CLINIC | Age: 62
End: 2017-12-05

## 2017-12-05 ENCOUNTER — TELEPHONE (OUTPATIENT)
Dept: TRANSPLANT | Facility: CLINIC | Age: 62
End: 2017-12-05

## 2017-12-05 LAB — TACROLIMUS BLD-MCNC: 12.3 NG/ML

## 2017-12-05 NOTE — PROGRESS NOTES
Results reviewed, and action is needed: Tacro levels are erratic and likley not accurate. Will not check as often and not adjust. Dr. Galloway PGY-2

## 2017-12-18 ENCOUNTER — LAB VISIT (OUTPATIENT)
Dept: LAB | Facility: HOSPITAL | Age: 62
End: 2017-12-18
Attending: INTERNAL MEDICINE
Payer: MEDICARE

## 2017-12-18 DIAGNOSIS — Z94.0 KIDNEY REPLACED BY TRANSPLANT: ICD-10-CM

## 2017-12-18 LAB
ALBUMIN SERPL BCP-MCNC: 3.4 G/DL
ALBUMIN SERPL BCP-MCNC: 3.4 G/DL
ALP SERPL-CCNC: 86 U/L
ALT SERPL W/O P-5'-P-CCNC: 12 U/L
ANION GAP SERPL CALC-SCNC: 11 MMOL/L
AST SERPL-CCNC: 14 U/L
BASOPHILS # BLD AUTO: 0.01 K/UL
BASOPHILS NFR BLD: 0.2 %
BILIRUB DIRECT SERPL-MCNC: 0.2 MG/DL
BILIRUB SERPL-MCNC: 0.5 MG/DL
BUN SERPL-MCNC: 29 MG/DL
CALCIUM SERPL-MCNC: 10.2 MG/DL
CHLORIDE SERPL-SCNC: 103 MMOL/L
CO2 SERPL-SCNC: 26 MMOL/L
CREAT SERPL-MCNC: 1.7 MG/DL
DIFFERENTIAL METHOD: NORMAL
EOSINOPHIL # BLD AUTO: 0.1 K/UL
EOSINOPHIL NFR BLD: 1.2 %
ERYTHROCYTE [DISTWIDTH] IN BLOOD BY AUTOMATED COUNT: 14.1 %
EST. GFR  (AFRICAN AMERICAN): 48.2 ML/MIN/1.73 M^2
EST. GFR  (NON AFRICAN AMERICAN): 41.7 ML/MIN/1.73 M^2
GLUCOSE SERPL-MCNC: 103 MG/DL
HCT VFR BLD AUTO: 45.9 %
HGB BLD-MCNC: 14.8 G/DL
LYMPHOCYTES # BLD AUTO: 1.4 K/UL
LYMPHOCYTES NFR BLD: 28.2 %
MAGNESIUM SERPL-MCNC: 1.8 MG/DL
MCH RBC QN AUTO: 29.9 PG
MCHC RBC AUTO-ENTMCNC: 32.2 G/DL
MCV RBC AUTO: 93 FL
MONOCYTES # BLD AUTO: 0.7 K/UL
MONOCYTES NFR BLD: 14 %
NEUTROPHILS # BLD AUTO: 2.7 K/UL
NEUTROPHILS NFR BLD: 56.4 %
PHOSPHATE SERPL-MCNC: 3.5 MG/DL
PLATELET # BLD AUTO: 214 K/UL
PMV BLD AUTO: 10.8 FL
POTASSIUM SERPL-SCNC: 4.3 MMOL/L
PROT SERPL-MCNC: 7 G/DL
RBC # BLD AUTO: 4.95 M/UL
SODIUM SERPL-SCNC: 140 MMOL/L
WBC # BLD AUTO: 4.85 K/UL

## 2017-12-18 PROCEDURE — 80197 ASSAY OF TACROLIMUS: CPT

## 2017-12-18 PROCEDURE — 82247 BILIRUBIN TOTAL: CPT

## 2017-12-18 PROCEDURE — 80069 RENAL FUNCTION PANEL: CPT

## 2017-12-18 PROCEDURE — 84075 ASSAY ALKALINE PHOSPHATASE: CPT

## 2017-12-18 PROCEDURE — 85025 COMPLETE CBC W/AUTO DIFF WBC: CPT | Mod: PO

## 2017-12-18 PROCEDURE — 87799 DETECT AGENT NOS DNA QUANT: CPT

## 2017-12-18 PROCEDURE — 83735 ASSAY OF MAGNESIUM: CPT

## 2017-12-18 PROCEDURE — 36415 COLL VENOUS BLD VENIPUNCTURE: CPT | Mod: PO

## 2017-12-19 LAB — TACROLIMUS BLD-MCNC: 11.5 NG/ML

## 2017-12-19 NOTE — PROGRESS NOTES
Results reviewed, and action is needed: Please assess if this is 12 hr trough and remind patient he should NOT go to lab unless level will be reliably close to 12 hrs after last tacrolimus dose.

## 2018-01-03 ENCOUNTER — OFFICE VISIT (OUTPATIENT)
Dept: TRANSPLANT | Facility: CLINIC | Age: 63
End: 2018-01-03
Payer: MEDICARE

## 2018-01-03 VITALS
DIASTOLIC BLOOD PRESSURE: 84 MMHG | SYSTOLIC BLOOD PRESSURE: 144 MMHG | TEMPERATURE: 98 F | WEIGHT: 214.94 LBS | HEART RATE: 88 BPM | OXYGEN SATURATION: 100 % | HEIGHT: 71 IN | RESPIRATION RATE: 20 BRPM | BODY MASS INDEX: 30.09 KG/M2

## 2018-01-03 DIAGNOSIS — Z94.0 S/P KIDNEY TRANSPLANT: Chronic | ICD-10-CM

## 2018-01-03 DIAGNOSIS — N18.30 CHRONIC KIDNEY DISEASE (CKD), STAGE III (MODERATE): Primary | Chronic | ICD-10-CM

## 2018-01-03 DIAGNOSIS — Z29.89 PROPHYLACTIC IMMUNOTHERAPY: Chronic | ICD-10-CM

## 2018-01-03 DIAGNOSIS — Z79.60 LONG-TERM USE OF IMMUNOSUPPRESSANT MEDICATION: ICD-10-CM

## 2018-01-03 PROCEDURE — 99214 OFFICE O/P EST MOD 30 MIN: CPT | Mod: S$GLB,,, | Performed by: INTERNAL MEDICINE

## 2018-01-03 PROCEDURE — 99213 OFFICE O/P EST LOW 20 MIN: CPT | Mod: PBBFAC | Performed by: INTERNAL MEDICINE

## 2018-01-03 PROCEDURE — 99999 PR PBB SHADOW E&M-EST. PATIENT-LVL III: CPT | Mod: PBBFAC,,, | Performed by: INTERNAL MEDICINE

## 2018-01-03 RX ORDER — PREDNISONE 5 MG/1
5 TABLET ORAL DAILY
Qty: 30 TABLET | Refills: 11 | Status: SHIPPED | OUTPATIENT
Start: 2018-01-03 | End: 2018-05-25 | Stop reason: SDUPTHER

## 2018-01-03 RX ORDER — HYDROCODONE BITARTRATE AND ACETAMINOPHEN 10; 325 MG/1; MG/1
1 TABLET ORAL 2 TIMES DAILY
COMMUNITY
End: 2018-05-25 | Stop reason: ALTCHOICE

## 2018-01-03 RX ORDER — TACROLIMUS 1 MG/1
CAPSULE ORAL
Qty: 150 CAPSULE | Refills: 11 | Status: SHIPPED | OUTPATIENT
Start: 2018-01-03 | End: 2018-05-10 | Stop reason: SDUPTHER

## 2018-01-03 RX ORDER — MYCOPHENOLATE MOFETIL 250 MG/1
500 CAPSULE ORAL 2 TIMES DAILY
Qty: 120 CAPSULE | Refills: 11 | Status: SHIPPED | OUTPATIENT
Start: 2018-01-03 | End: 2018-05-25 | Stop reason: SDUPTHER

## 2018-01-03 NOTE — LETTER
January 3, 2018        Avel Estrada  [address]  [place] [address]  Phone: [phone]  Fax: [phone]             [facility]  [address]  [facility] [address]  Phone: [phone]   Patient: Lavelle Ladd   MR Number: [id number]   YOB: 1955   Date of Visit: 1/3/2018       Dear Dr. Avel Estrada    Thank you for referring Lavelle Ladd to me for evaluation. Attached you will find relevant portions of my assessment and plan of care.    If you have questions, please do not hesitate to call me. I look forward to following Lavelle Ladd along with you.    Sincerely,    Tiana Blanco MD    Enclosure    If you would like to receive this communication electronically, please contact [email or web address] or [phone] to request [employer] Link access.    [employer] Link is a tool which provides read-only access to select patient information with whom you have a relationship. Its easy to use and provides real time access to review your patients record including encounter summaries, notes, results, and demographic information.    If you feel you have received this communication in error or would no longer like to receive these types of communications, please e-mail [email or web address]

## 2018-01-03 NOTE — PROGRESS NOTES
Kidney Post-Transplant Assessment    Referring Physician: Avel Estrada  Current Nephrologist: Avel Estrada    ORGAN: RIGHT KIDNEY  Donor Type:  - brain death  PHS Increased Risk: yes  Cold Ischemia: 1088 mins  Induction Medications: thymoglobulin    Subjective:     CC:  Reassessment of renal allograft function and management of chronic immunosuppression.    HPI:  Mr. Ladd is a 62 y.o. year old White male who received a  - brain death kidney transplant on 16.  He has CKD stage 3 - GFR 30-59 and his baseline creatinine is between 1.7-1.9. He takes mycophenolate mofetil, prednisone and tacrolimus for maintenance immunosuppression. He denies any recent hospitalizations or ER visits since his previous clinic visit.    Pertinent History:  ESRD from DM, on hemodialysis started on 10/2013  Organ: SCD, increased PHS risk donor, 54% KDPI  partial dehiscence. 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 Thymo x8 doses (3 half doses, others full dose)  2016 f/u Biopsy - no rejection   +orthostatic hypotension in hospital, d/c'd on midodrine (d/c'd 16)  8/15/16: suspicious for ACR and AMR, no AVR. Rx- SMx3 - Non HLA Ab neg   biopsy with ACR s/p SM pulse and PLEX x3; IVIG in BR   Dec 2016 + SOB and dound to have RUL cavitary lung lesion, found to have kleb pneumo growing from BAL and Enterobacter septicemia now s/p over 4 weeks of IV ertapenem    Lavelle notes he recently was found to have influenza and completed Tamiflu recently. He denies hs breathing is worse than baseline, but notes he remains tired. He denies any kidney-related complaints, such as pain over allograft, flank pain, dysuria, frequency, or other LUTS. He is walking with his prosthesis w/o  "complications.    Review of Systems   Constitutional: Positive for fatigue. Negative for diaphoresis and fever.   Respiratory: Positive for shortness of breath (intermittent).    Cardiovascular: Negative for chest pain and leg swelling.   Gastrointestinal: Negative for abdominal pain.   Genitourinary: Negative for decreased urine volume, flank pain, frequency and hematuria.   Musculoskeletal: Positive for back pain (chronic, on methadone).        Right BKA with prosthesis   Skin: Negative for rash.   Allergic/Immunologic: Positive for immunocompromised state.   Neurological: Negative for light-headedness.   Psychiatric/Behavioral: Negative for confusion.     Objective:     Blood pressure (!) 144/84, pulse 88, temperature 97.8 °F (36.6 °C), temperature source Oral, resp. rate 20, height 5' 11" (1.803 m), weight 97.5 kg (214 lb 15.2 oz), SpO2 100 %.body mass index is 29.98 kg/m².    Physical Exam   Constitutional: He is oriented to person, place, and time. He has a sickly appearance.   HENT:   Head: Normocephalic.   Eyes: Conjunctivae are normal.   Pulmonary/Chest: Effort normal. He has wheezes. He has no rales.   Abdominal: Soft. He exhibits no mass. There is no tenderness.   Musculoskeletal: He exhibits no edema.   Neurological: He is alert and oriented to person, place, and time.   Skin: Skin is dry. No rash noted.     Labs:  Lab Results   Component Value Date    WBC 4.85 12/18/2017    HGB 14.8 12/18/2017    HCT 45.9 12/18/2017     12/18/2017    K 4.3 12/18/2017     12/18/2017    CO2 26 12/18/2017    BUN 29 (H) 12/18/2017    CREATININE 1.7 (H) 12/18/2017    EGFRNONAA 41.7 (A) 12/18/2017    CALCIUM 10.2 12/18/2017    PHOS 3.5 12/18/2017    MG 1.8 12/18/2017    ALBUMIN 3.4 (L) 12/18/2017    ALBUMIN 3.4 (L) 12/18/2017    AST 14 12/18/2017    ALT 12 12/18/2017    UTPCR 0.12 12/04/2017    .0 (H) 10/30/2017    TACROLIMUS 11.5 12/18/2017   Labs were reviewed with the patient.    Assessment:     1. " Chronic kidney disease (CKD), stage III (moderate)    2.  donor kidney transplant for DM 16    3. Prophylactic immunotherapy    4. Long-term use of immunosuppressant medication        Plan:     Allograft function assessment  Lab Results   Component Value Date    CREATININE 1.7 (H) 2017   CKD3 s/p kidney transplantation at baseline, . Continue to monitor renal function and electrolytes, HTN, secondary hyperparathyroidism and other issues related to underlying ESRD.3     Continue tacrolimus-based immunosuppression.  Will continue to watch signs, symptoms and levels for side effects and drug toxicity. Levels have been mostly high and he states he has been taking his med prior to having labs done, as stated in lab sheet. Re-educated he should not take tacrolimus despite whatever it says on generic instructions. Knowing level is not trough and past rejection, I will not adjust.    -Monitoring for proteinuria and BK -  Most recent results show:   Lab Results   Component Value Date    BKVIRUSPCRQB <125 2017    BKVIRUSPCRQB <125 2017    UTPCR 0.12 2017    UTPCR 0.11 10/09/2017   -Both BK PCR and urine p/c ratio are acceptable  -Continue monitoring as per program guidelines since BK infection & proteinuria are harbingers of possible allograft dysfunction/failure.     Management of kidney disease and related issues should continue by community nephrologist.   Advised if breathing worsens, to f/u with his lung doctor given known lung disease.     Follow-up:   Clinic: return to transplant clinic at 24-, and 36- months post-transplant to reassess for complications from immunosuppression toxicity and monitor for rejection.  Annually thereafter.    Labs: since patient remains at high risk for rejection and drug-related complications that warrant close monitoring, labs will be ordered as follows: continue twice weekly CBC, renal panel, and drug level for first month; then same labs once weekly  through 3rd month post-transplant.  Urine for UA and protein/creatinine ratio monthly.  Urine BK - PCR at 1-, 3-, 6-, 9-, 12-, 18-, 24-, and 36- months post-transplant.  Hepatic panel at 1-, 2-, 3-, 6-, 9-, 12-, 18-, 24-, and 36- months post-transplant.    Tiana Blanco MD       Education:   Material provided to the patient.  Patient reminded to call with any health changes since these can be early signs of significant complications.  Also, I advised the patient to be sure any new medications or changes of old medications are discussed with either a pharmacist or physician knowledgeable with transplant to avoid rejection/drug toxicity related to significant drug interactions.    UNOS Patient Status  Functional Status: 60% - Requires occasional assistance but is able to care for needs  Physical Capacity: Limited Mobility sometimes she might see your patient and it would only be

## 2018-01-03 NOTE — PATIENT INSTRUCTIONS
From Dr. Apple:  It is my privilege to participate in your post-transplant care!  Please be sure to let us know if you have any questions or concerns about your health care - we cannot help you if we do not know.  Don't forget we are on call 24/7 for any emergencies.   Best Wishes,  Dr. Ambika Blanco

## 2018-01-25 ENCOUNTER — OFFICE VISIT (OUTPATIENT)
Dept: NEUROLOGY | Facility: CLINIC | Age: 63
End: 2018-01-25
Payer: MEDICARE

## 2018-01-25 ENCOUNTER — HOSPITAL ENCOUNTER (OUTPATIENT)
Dept: RADIOLOGY | Facility: HOSPITAL | Age: 63
Discharge: HOME | End: 2018-01-25
Attending: PSYCHIATRY & NEUROLOGY
Payer: MEDICARE

## 2018-01-25 VITALS
SYSTOLIC BLOOD PRESSURE: 140 MMHG | WEIGHT: 214.94 LBS | BODY MASS INDEX: 30.09 KG/M2 | HEIGHT: 71 IN | DIASTOLIC BLOOD PRESSURE: 78 MMHG

## 2018-01-25 DIAGNOSIS — E11.42 DIABETIC POLYNEUROPATHY ASSOCIATED WITH TYPE 2 DIABETES MELLITUS: ICD-10-CM

## 2018-01-25 DIAGNOSIS — G89.29 CHRONIC BILATERAL LOW BACK PAIN WITH LEFT-SIDED SCIATICA: ICD-10-CM

## 2018-01-25 DIAGNOSIS — M54.42 CHRONIC BILATERAL LOW BACK PAIN WITH LEFT-SIDED SCIATICA: ICD-10-CM

## 2018-01-25 DIAGNOSIS — S22.42XA CLOSED FRACTURE OF MULTIPLE RIBS OF LEFT SIDE, INITIAL ENCOUNTER: Primary | ICD-10-CM

## 2018-01-25 DIAGNOSIS — R07.89 OTHER CHEST PAIN: ICD-10-CM

## 2018-01-25 PROCEDURE — 71100 X-RAY EXAM RIBS UNI 2 VIEWS: CPT | Mod: TC

## 2018-01-25 PROCEDURE — 99999 PR PBB SHADOW E&M-EST. PATIENT-LVL V: CPT | Mod: PBBFAC,,, | Performed by: PSYCHIATRY & NEUROLOGY

## 2018-01-25 PROCEDURE — 71100 X-RAY EXAM RIBS UNI 2 VIEWS: CPT | Mod: 26,,, | Performed by: RADIOLOGY

## 2018-01-25 PROCEDURE — 99205 OFFICE O/P NEW HI 60 MIN: CPT | Mod: S$GLB,,, | Performed by: PSYCHIATRY & NEUROLOGY

## 2018-01-25 RX ORDER — GABAPENTIN 300 MG/1
300 CAPSULE ORAL 3 TIMES DAILY
Qty: 90 CAPSULE | Refills: 11 | Status: SHIPPED | OUTPATIENT
Start: 2018-01-25 | End: 2018-03-19 | Stop reason: SDUPTHER

## 2018-01-25 RX ORDER — ARM BRACE
EACH MISCELLANEOUS
Qty: 1 EACH | Status: SHIPPED | OUTPATIENT
Start: 2018-01-25 | End: 2018-06-12 | Stop reason: ALTCHOICE

## 2018-01-25 RX ORDER — NAPROXEN 500 MG/1
TABLET ORAL
COMMUNITY
Start: 2018-01-19 | End: 2018-06-12

## 2018-01-25 NOTE — PROGRESS NOTES
Consult Requested By: No att. providers found  Reason for Consult:   1. Back pain   2. Left foot numbness     SUBJECTIVE:       HPI:   This is a 62-year-old right-handed pleasant gentleman with multiple medical   problems including diabetes, kidney disease status post kidney transplant two   years ago, presented this time with the complaint of back pain and also left   foot numbness.  He had kidney transplant about two years ago and diabetes about   8 or 9 years ago.  He is now on prednisone and also CellCept.  He said that for   about a year, he has back pain  and this pain sometime goes to his leg up to   the toes.  He has two kind of pain.  One is continuous pain in the back and one   is routine pain.  Lately over 6-8 months, he has noted that his left foot is   numb, tingly, and sometimes it gets worse.  He feels cold and clammy in the left   foot.  He also had right leg amputated below-knee because of infections five   years ago.  He has been going to Pain Clinic and now on methadone and also pain   medications.  [facility]   and advised him to get surgery, but he is a little bit scared of surgery.  He   also has 2 injections in the back and also nerve was burned in the Pain Clinic.    He is here for neurological evaluation and further treatment.  He also fell a   couple of days ago on ice and now hurting on his left side of the chest.  He is   scared of broken ribs.  Otherwise, he is a very nice person and spirited.  He   needs help for his pain and neurological conditions.  MRI of the lumbar spine   done last year in [facility].      Past Medical History:   Diagnosis Date    DINORAH (acute kidney injury) 2016    Arthritis     CHF (congestive heart failure)     Chronic obstructive pulmonary disease 2016    Coronary artery disease involving native coronary artery of native heart without angina pectoris 2016     donor kidney  transplant for DM 5/21/16     Induction with Thymo x3 and IV solumedrol to total 875mg  Kidney Biopsy  5/31/2016: 16 glomeruli, ACR type 1 AVR type 2, significant microcirculatory changes, c4d negative, No DSA, 5 to10% fibrosis. Treated with thymo x8 6/21/2016- no rejection      Diastolic heart failure     DM2 (diabetes mellitus, type 2)     Encounter for blood transfusion     ESRD on RRT since 10/2013 10/29/2013    Biopsy proven diabetic nephropathy and lymphoplasmacytic interstitial infiltrate not c/w with AIN (ddx sjogrens or assoc with tamm-horsefall protein extravasation)     GERD (gastroesophageal reflux disease)     History of hepatitis C, s/p successful Rx w/ SVR12 - 4/2017 4/5/2017    Completed 12 weeks harvoni w/ SVR    Hyperlipidemia     Hypertension     Prophylactic immunotherapy     Proteinuria     PVD (peripheral vascular disease) 6/26/2017    RLE BKA CT 12/11/16 Extensive atherosclerotic disease of the aorta and mesenteric arteries.     Reactive airway disease     Renal hypertension     Type 2 diabetes mellitus with diabetic neuropathy, with long-term current use of insulin 12/1/2016    Type 2 diabetes mellitus with hyperglycemia, with long-term current use of insulin     Type 2 diabetes mellitus with retinopathy, with long-term current use of insulin 12/1/2016    Vitamin B12 deficiency      Past Surgical History:   Procedure Laterality Date    av bovine graft      Left UE    AV FISTULA PLACEMENT      left UE    CARDIAC CATHETERIZATION  02/2015    KIDNEY TRANSPLANT  05/21/2016    LEG AMPUTATION THROUGH KNEE  2011    right LE, started as nail puncture leading to diabetic ulcer     Family History   Problem Relation Age of Onset    Cancer Father     Diabetes Father     Heart failure Father     Stroke Father     Heart failure Mother     Kidney disease Neg Hx      Social History   Substance Use Topics    Smoking status: Former Smoker     Packs/day: 1.00     Years: 40.00      Quit date: 1/11/2013    Smokeless tobacco: Never Used    Alcohol use 3.6 oz/week     6 Cans of beer per week      Comment: 6 beers/week     Review of patient's allergies indicates:   Allergen Reactions    Tylenol [acetaminophen]      Told not to take per Transplant Team       Review of Systems   Constitutional: Negative for fever and weight loss.   HENT: Negative for ear pain, hearing loss and tinnitus.    Eyes: Negative for blurred vision, double vision, photophobia, pain, discharge and redness.   Respiratory: Negative for cough and shortness of breath.    Cardiovascular: Positive for chest pain. Negative for palpitations, claudication and leg swelling.   Gastrointestinal: Negative for abdominal pain, heartburn, nausea and vomiting.   Genitourinary: Negative for dysuria, flank pain, frequency and urgency.   Musculoskeletal: Positive for back pain, falls, joint pain and myalgias. Negative for neck pain.   Skin: Negative for itching and rash.   Neurological: Positive for tingling and sensory change. Negative for dizziness, tremors, speech change, focal weakness, seizures, loss of consciousness, weakness and headaches.   Endo/Heme/Allergies: Does not bruise/bleed easily.   Psychiatric/Behavioral: Negative for depression, hallucinations, memory loss and suicidal ideas. The patient is not nervous/anxious and does not have insomnia.        OBJECTIVE:     Vital Signs (Most Recent)  BP: (!) 140/78 (01/25/18 0954)    Physical Exam   Constitutional: He is oriented to person, place, and time. He appears well-developed and well-nourished. No distress.   HENT:   Head: Normocephalic.   Right Ear: External ear normal.   Left Ear: External ear normal.   Mouth/Throat: Oropharynx is clear and moist.   Eyes: Conjunctivae and EOM are normal. Pupils are equal, round, and reactive to light.   Neck: Normal range of motion. Neck supple. No tracheal deviation present. No thyromegaly present.   Cardiovascular: Regular rhythm, normal  heart sounds and intact distal pulses.    Pulmonary/Chest: Effort normal and breath sounds normal. No respiratory distress.   Abdominal: Soft. Bowel sounds are normal. There is no tenderness.   Musculoskeletal: He exhibits no edema or tenderness.   Lymphadenopathy:     He has no cervical adenopathy.   Neurological: He is alert and oriented to person, place, and time. He has normal strength. He displays no tremor and normal reflexes. A sensory deficit is present. No cranial nerve deficit. He exhibits normal muscle tone. Gait abnormal. Coordination normal. He displays no Babinski's sign on the right side. He displays no Babinski's sign on the left side.   Reflex Scores:       Tricep reflexes are 0 on the right side and 0 on the left side.       Bicep reflexes are 0 on the right side and 0 on the left side.       Brachioradialis reflexes are 0 on the right side and 0 on the left side.       Patellar reflexes are 0 on the left side.       Achilles reflexes are 0 on the left side.  He has decreased sensation in his left foot. Left side of the upper chest is tender and hurts on movements after the fall couple of days ago on the ice.  He is on artificial limb on the right side.   Skin: Skin is warm and dry. No rash noted. He is not diaphoretic. No erythema. No pallor.   Psychiatric: He has a normal mood and affect. His behavior is normal. Judgment and thought content normal.         Strength  Deltoids Triceps Biceps Wrist Extension Wrist Flexion Hand    Upper: R 5/5 5/5 5/5 5/5 5/5 5/5    L 5/5 5/5 5/5 5/5 5/5 5/5     Iliopsoas Quadriceps Knee  Flexion Tibialis  anterior Gastro- cnemius EHL   Lower: R 5/5 5/5        L 5/5 5/5 5/5 5/5 5/5 5/5     Laboratory:  Lab Results   Component Value Date    WBC 4.85 12/18/2017    HGB 14.8 12/18/2017    HCT 45.9 12/18/2017     12/18/2017    CHOL 192 12/04/2017    TRIG 99 12/04/2017    HDL 49 12/04/2017    ALT 12 12/18/2017    AST 14 12/18/2017     12/18/2017    K 4.3  12/18/2017     12/18/2017    CREATININE 1.7 (H) 12/18/2017    BUN 29 (H) 12/18/2017    CO2 26 12/18/2017    TSH 1.329 05/08/2017    PSA 0.29 08/25/2015    INR 1.0 06/15/2017    HGBA1C 7.1 (H) 11/03/2017         Diagnostic Results:    MRI of L-spine: 5/28/2016      Impression      Mild to moderate multilevel lumbar spine degenerative disease, most prominent at L3-4 were there is moderate central canal narrowing and moderate bilateral neural foramina stenosis.         ASSESSMENT/PLAN:     Primary Diagnoses:  Problem List Items Addressed This Visit     Chronic bilateral low back pain with left-sided sciatica    Overview     He has long standing back pain. He had lumbar disc disease, moderately severe and disc bulging. This is causing him to have consatant pain in the back and shooting pain in the left leg. He has injections in the back and now in the pain clinic. Constant pain is making him lose quality of life. Neurosurgerical evaluation appreciated. He might need fusion of his vertebra.         Relevant Orders    Ambulatory consult to Neurosurgery    Ambulatory consult to Neurosurgery    X-Ray Ribs 2 View Left (Completed)    Diabetic polyneuropathy    Overview     He has chronic diabetic neuropathy which is causing numb feeling in his feet . No feeling in the toes in the left foot.         Relevant Orders    Ambulatory consult to Neurosurgery    Ambulatory consult to Neurosurgery    X-Ray Ribs 2 View Left (Completed)    Other chest pain    Overview     He fell and hurt his left side of chest and rib case. Fracture is the possibility.         Relevant Orders    Ambulatory consult to Neurosurgery    Ambulatory consult to Neurosurgery    X-Ray Ribs 2 View Left (Completed)            Patient Active Problem List   Diagnosis    Renal hypertension    GERD (gastroesophageal reflux disease)    Peptic ulcer disease    Malignant HTN with heart disease, w/o CHF, with chronic kidney disease    Essential hypertension     Acute diastolic heart failure    Gastroparesis     donor kidney transplant for DM 16    Long-term use of immunosuppressant medication    Prophylactic immunotherapy    Adverse effect of glucocorticoids and synthetic analogues, sequela    Osteoarthritis of lumbar spine    Osteoarthritis of thoracic spine with myelopathy    Type 2 diabetes mellitus with hyperglycemia, with long-term current use of insulin    Coronary artery disease involving native coronary artery of native heart without angina pectoris    Hyperlipidemia    Chronic obstructive pulmonary disease    Ulnar neuropathy    Chronic kidney disease (CKD), stage III (moderate)    Reactive airway disease    Kidney transplant rejection    Type 2 diabetes mellitus with retinopathy, with long-term current use of insulin    Type 2 diabetes mellitus with diabetic neuropathy, with long-term current use of insulin    H/O amputation    Cavitary lesion of lung    Opacity of lung on imaging study    Bacteremia due to Gram-negative bacteria    ESBL (extended spectrum beta-lactamase) producing bacteria infection    History of hepatitis C, s/p successful Rx w/ SVR12 - 2017    Kidney transplant recipient    Intractable vomiting with nausea    PVD (peripheral vascular disease)        Plan: Back brace has been prescribed.  I will see him PRN

## 2018-01-25 NOTE — LETTER
January 25, 2018      Amira Lynn Jr., USMAN  [address] [facility] [address]           [facility]  [address] [place] [id number]  Phone: [phone]  Fax: [phone]          Patient: Lavelle Ladd   MR Number: [id number]   YOB: 1955   Date of Visit: 1/25/2018       Dear Amira Lynn Jr.:    Thank you for referring Lavelle Ladd to me for evaluation. Attached you will find relevant portions of my assessment and plan of care.    If you have questions, please do not hesitate to call me. I look forward to following Lavelle Ladd along with you.    Sincerely,    Charlette Estrada MD    Enclosure  CC:  No Recipients    If you would like to receive this communication electronically, please contact [email or web address] or [phone] to request more information on [employer] Link access.    For providers and/or their staff who would like to refer a patient to [facility], please contact us through our one-stop-shop provider referral line, [facility], at [phone].    If you feel you have received this communication in error or would no longer like to receive these types of communications, please e-mail [email or web address]

## 2018-01-26 ENCOUNTER — TELEPHONE (OUTPATIENT)
Dept: NEUROLOGY | Facility: CLINIC | Age: 63
End: 2018-01-26

## 2018-01-26 NOTE — TELEPHONE ENCOUNTER
Contacted pt on message he left about xray results, informed pt on results he stated he made an appt with orthopedic and  They called and cx his appt deann that did see pt with broken ribs . Pt placed me on hold and came back and told me he was in the drive thru he will get back at me and disconnected the line !    Hector Hamilton

## 2018-02-05 ENCOUNTER — TELEPHONE (OUTPATIENT)
Dept: NEUROLOGY | Facility: CLINIC | Age: 63
End: 2018-02-05

## 2018-02-05 DIAGNOSIS — M54.50 LOW BACK PAIN, NON-SPECIFIC: ICD-10-CM

## 2018-02-08 ENCOUNTER — TELEPHONE (OUTPATIENT)
Dept: RADIOLOGY | Facility: HOSPITAL | Age: 63
End: 2018-02-08

## 2018-02-09 ENCOUNTER — HOSPITAL ENCOUNTER (OUTPATIENT)
Dept: RADIOLOGY | Facility: HOSPITAL | Age: 63
Discharge: HOME | End: 2018-02-09
Attending: PSYCHIATRY & NEUROLOGY
Payer: MEDICARE

## 2018-02-09 DIAGNOSIS — M54.50 LOW BACK PAIN, NON-SPECIFIC: ICD-10-CM

## 2018-02-09 RX ORDER — ALPRAZOLAM 1 MG/1
1 TABLET ORAL ONCE AS NEEDED
Qty: 3 TABLET | Refills: 0 | Status: SHIPPED | OUTPATIENT
Start: 2018-02-09 | End: 2018-02-17

## 2018-02-12 ENCOUNTER — TELEPHONE (OUTPATIENT)
Dept: NEUROLOGY | Facility: CLINIC | Age: 63
End: 2018-02-12

## 2018-02-12 DIAGNOSIS — M54.50 LOW BACK PAIN, NON-SPECIFIC: ICD-10-CM

## 2018-02-12 NOTE — TELEPHONE ENCOUNTER
Called pt. Informed that orders have been faxed to [facility] for open MRI and advised pt. That their staff will contact him to schedule an appt.

## 2018-02-12 NOTE — TELEPHONE ENCOUNTER
----- Message from Caterina Sandoval sent at 2/12/2018 10:04 AM CST -----  Patient states that he was not able to do his MRI.  He said that it is too closed up and he has been collecting recent MRI's that he has had done.  He would like to do an open MRI.   Call him at [phone].                                                 floyd

## 2018-02-16 ENCOUNTER — TELEPHONE (OUTPATIENT)
Dept: RADIOLOGY | Facility: HOSPITAL | Age: 63
End: 2018-02-16

## 2018-02-17 ENCOUNTER — HOSPITAL ENCOUNTER (EMERGENCY)
Facility: HOSPITAL | Age: 63
Discharge: HOME | End: 2018-02-17
Attending: INTERNAL MEDICINE
Payer: MEDICARE

## 2018-02-17 ENCOUNTER — NURSE TRIAGE (OUTPATIENT)
Dept: ADMINISTRATIVE | Facility: CLINIC | Age: 63
End: 2018-02-17

## 2018-02-17 VITALS
TEMPERATURE: 98 F | BODY MASS INDEX: 30.29 KG/M2 | WEIGHT: 217.13 LBS | OXYGEN SATURATION: 98 % | RESPIRATION RATE: 15 BRPM | SYSTOLIC BLOOD PRESSURE: 168 MMHG | HEART RATE: 61 BPM | DIASTOLIC BLOOD PRESSURE: 80 MMHG

## 2018-02-17 DIAGNOSIS — Z94.0 S/P KIDNEY TRANSPLANT: Chronic | ICD-10-CM

## 2018-02-17 DIAGNOSIS — Z86.19 HISTORY OF HEPATITIS C: ICD-10-CM

## 2018-02-17 DIAGNOSIS — R07.9 CHEST PAIN: ICD-10-CM

## 2018-02-17 DIAGNOSIS — F41.0 ANXIETY ATTACK: ICD-10-CM

## 2018-02-17 DIAGNOSIS — F11.93 OPIOID WITHDRAWAL: ICD-10-CM

## 2018-02-17 DIAGNOSIS — R06.02 SOB (SHORTNESS OF BREATH): ICD-10-CM

## 2018-02-17 DIAGNOSIS — I16.0 HYPERTENSIVE URGENCY: Primary | ICD-10-CM

## 2018-02-17 LAB
ALBUMIN SERPL BCP-MCNC: 3.9 G/DL
ALP SERPL-CCNC: 131 U/L
ALT SERPL W/O P-5'-P-CCNC: 14 U/L
ANION GAP SERPL CALC-SCNC: 14 MMOL/L
AST SERPL-CCNC: 16 U/L
BASOPHILS # BLD AUTO: 0.01 K/UL
BASOPHILS NFR BLD: 0.2 %
BILIRUB SERPL-MCNC: 0.5 MG/DL
BNP SERPL-MCNC: 32 PG/ML
BUN SERPL-MCNC: 24 MG/DL
CALCIUM SERPL-MCNC: 9.8 MG/DL
CHLORIDE SERPL-SCNC: 104 MMOL/L
CO2 SERPL-SCNC: 20 MMOL/L
CREAT SERPL-MCNC: 1.7 MG/DL
D DIMER PPP IA.FEU-MCNC: 0.86 MG/L FEU
DIFFERENTIAL METHOD: NORMAL
EOSINOPHIL # BLD AUTO: 0 K/UL
EOSINOPHIL NFR BLD: 0.6 %
ERYTHROCYTE [DISTWIDTH] IN BLOOD BY AUTOMATED COUNT: 14.3 %
EST. GFR  (AFRICAN AMERICAN): 48 ML/MIN/1.73 M^2
EST. GFR  (NON AFRICAN AMERICAN): 42 ML/MIN/1.73 M^2
GLUCOSE SERPL-MCNC: 180 MG/DL
HCT VFR BLD AUTO: 47.3 %
HGB BLD-MCNC: 15.8 G/DL
LYMPHOCYTES # BLD AUTO: 1.9 K/UL
LYMPHOCYTES NFR BLD: 29.9 %
MCH RBC QN AUTO: 30.8 PG
MCHC RBC AUTO-ENTMCNC: 33.4 G/DL
MCV RBC AUTO: 92 FL
MONOCYTES # BLD AUTO: 0.7 K/UL
MONOCYTES NFR BLD: 11.8 %
NEUTROPHILS # BLD AUTO: 3.6 K/UL
NEUTROPHILS NFR BLD: 57.5 %
PLATELET # BLD AUTO: 189 K/UL
PMV BLD AUTO: 10.3 FL
POTASSIUM SERPL-SCNC: 4.9 MMOL/L
PROT SERPL-MCNC: 7.5 G/DL
RBC # BLD AUTO: 5.13 M/UL
SODIUM SERPL-SCNC: 138 MMOL/L
TROPONIN I SERPL DL<=0.01 NG/ML-MCNC: <0.006 NG/ML
WBC # BLD AUTO: 6.28 K/UL

## 2018-02-17 PROCEDURE — 85025 COMPLETE CBC W/AUTO DIFF WBC: CPT

## 2018-02-17 PROCEDURE — 25000003 PHARM REV CODE 250: Performed by: INTERNAL MEDICINE

## 2018-02-17 PROCEDURE — 80053 COMPREHEN METABOLIC PANEL: CPT

## 2018-02-17 PROCEDURE — 93010 ELECTROCARDIOGRAM REPORT: CPT | Mod: ,,, | Performed by: INTERNAL MEDICINE

## 2018-02-17 PROCEDURE — 83880 ASSAY OF NATRIURETIC PEPTIDE: CPT

## 2018-02-17 PROCEDURE — 96365 THER/PROPH/DIAG IV INF INIT: CPT | Mod: 59

## 2018-02-17 PROCEDURE — 96375 TX/PRO/DX INJ NEW DRUG ADDON: CPT | Mod: 59

## 2018-02-17 PROCEDURE — 63600175 PHARM REV CODE 636 W HCPCS: Performed by: INTERNAL MEDICINE

## 2018-02-17 PROCEDURE — 25500020 PHARM REV CODE 255: Performed by: INTERNAL MEDICINE

## 2018-02-17 PROCEDURE — 99285 EMERGENCY DEPT VISIT HI MDM: CPT | Mod: 25

## 2018-02-17 PROCEDURE — 93005 ELECTROCARDIOGRAM TRACING: CPT

## 2018-02-17 PROCEDURE — 84484 ASSAY OF TROPONIN QUANT: CPT

## 2018-02-17 PROCEDURE — 85379 FIBRIN DEGRADATION QUANT: CPT

## 2018-02-17 RX ORDER — ASPIRIN 325 MG
325 TABLET ORAL
Status: COMPLETED | OUTPATIENT
Start: 2018-02-17 | End: 2018-02-17

## 2018-02-17 RX ORDER — METOPROLOL TARTRATE 1 MG/ML
5 INJECTION, SOLUTION INTRAVENOUS
Status: COMPLETED | OUTPATIENT
Start: 2018-02-17 | End: 2018-02-17

## 2018-02-17 RX ORDER — ONDANSETRON 2 MG/ML
4 INJECTION INTRAMUSCULAR; INTRAVENOUS
Status: COMPLETED | OUTPATIENT
Start: 2018-02-17 | End: 2018-02-17

## 2018-02-17 RX ORDER — CLONIDINE HYDROCHLORIDE 0.3 MG/1
0.3 TABLET ORAL
Status: COMPLETED | OUTPATIENT
Start: 2018-02-17 | End: 2018-02-17

## 2018-02-17 RX ORDER — METHADONE HYDROCHLORIDE 5 MG/1
5 TABLET ORAL EVERY 6 HOURS PRN
Qty: 30 TABLET | Refills: 0 | Status: SHIPPED | OUTPATIENT
Start: 2018-02-17 | End: 2018-05-25 | Stop reason: ALTCHOICE

## 2018-02-17 RX ORDER — HYDRALAZINE HYDROCHLORIDE 20 MG/ML
20 INJECTION INTRAMUSCULAR; INTRAVENOUS
Status: DISCONTINUED | OUTPATIENT
Start: 2018-02-17 | End: 2018-02-17 | Stop reason: HOSPADM

## 2018-02-17 RX ORDER — ALPRAZOLAM 1 MG/1
1 TABLET ORAL 3 TIMES DAILY PRN
Qty: 30 TABLET | Refills: 0 | Status: SHIPPED | OUTPATIENT
Start: 2018-02-17 | End: 2018-06-12

## 2018-02-17 RX ORDER — ACETAMINOPHEN 10 MG/ML
1000 INJECTION, SOLUTION INTRAVENOUS
Status: COMPLETED | OUTPATIENT
Start: 2018-02-17 | End: 2018-02-17

## 2018-02-17 RX ADMIN — IOHEXOL 100 ML: 350 INJECTION, SOLUTION INTRAVENOUS at 06:02

## 2018-02-17 RX ADMIN — ACETAMINOPHEN 1000 MG: 10 INJECTION, SOLUTION INTRAVENOUS at 06:02

## 2018-02-17 RX ADMIN — CLONIDINE HYDROCHLORIDE 0.3 MG: 0.3 TABLET ORAL at 06:02

## 2018-02-17 RX ADMIN — METOPROLOL TARTRATE 5 MG: 5 INJECTION INTRAVENOUS at 05:02

## 2018-02-17 RX ADMIN — ASPIRIN 325 MG ORAL TABLET 325 MG: 325 PILL ORAL at 05:02

## 2018-02-17 RX ADMIN — ONDANSETRON 4 MG: 2 INJECTION INTRAMUSCULAR; INTRAVENOUS at 05:02

## 2018-02-17 NOTE — ED PROVIDER NOTES
SCRIBE #1 NOTE: I, Corie Murillo/Lesly José, am scribing for, and in the presence of, Avel Estrada MD. I have scribed the entire note.      History      Chief Complaint   Patient presents with    Chest Pain     began last night; also reports SOB       Review of patient's allergies indicates:   Allergen Reactions    Tylenol [acetaminophen]      Told not to take per Transplant Team        HPI   HPI    2018, 5:11 PM   History obtained from the patient      History of Present Illness: Lavelle Ladd is a 62 y.o. male patient who presents to the Emergency Department fo L sided pleuritic CP which onset gradually yesterday. Symptoms are intermittent and moderate in severity. Worse with deep breath, coughing, and with movement. No mitigating factors reported. Associated sxs include SOB and diaphoresis. Patient denies any fever, extremity weakness/numbness, dizziness, cough, N/V, BLE edema/pain, recent travel/long car rides, fever, chills, and all other sxs at this time. Prior Tx reported. Pt has RLE BKA and reports that he has been on pain management (Methadone) for chronic phantom pain. Pt states he self discontinued Methadone 6 days ago. No further complaints or concerns at this time.       Arrival mode: Personal vehicle    PCP: Rishabh Esteban MD       Past Medical History:  Past Medical History:   Diagnosis Date    DINORAH (acute kidney injury) 2016    Arthritis     CHF (congestive heart failure)     Chronic obstructive pulmonary disease 2016    Coronary artery disease involving native coronary artery of native heart without angina pectoris 2016     donor kidney transplant for DM 16     Induction with Thymo x3 and IV solumedrol to total 875mg  Kidney Biopsy  2016: 16 glomeruli, ACR type 1 AVR type 2, significant microcirculatory changes, c4d negative, No DSA, 5 to10% fibrosis. Treated with thymo x8 2016- no rejection      Diastolic heart failure     DM2 (diabetes  mellitus, type 2)     Encounter for blood transfusion     ESRD on RRT since 10/2013 10/29/2013    Biopsy proven diabetic nephropathy and lymphoplasmacytic interstitial infiltrate not c/w with AIN (ddx sjogrens or assoc with tamm-horsefall protein extravasation)     GERD (gastroesophageal reflux disease)     History of hepatitis C, s/p successful Rx w/ SVR12 - 4/2017 4/5/2017    Completed 12 weeks harvoni w/ SVR    Hyperlipidemia     Hypertension     Prophylactic immunotherapy     Proteinuria     PVD (peripheral vascular disease) 6/26/2017    RLE BKA CT 12/11/16 Extensive atherosclerotic disease of the aorta and mesenteric arteries.     Reactive airway disease     Renal hypertension     Type 2 diabetes mellitus with diabetic neuropathy, with long-term current use of insulin 12/1/2016    Type 2 diabetes mellitus with hyperglycemia, with long-term current use of insulin     Type 2 diabetes mellitus with retinopathy, with long-term current use of insulin 12/1/2016    Vitamin B12 deficiency        Past Surgical History:  Past Surgical History:   Procedure Laterality Date    av bovine graft      Left UE    AV FISTULA PLACEMENT      left UE    CARDIAC CATHETERIZATION  02/2015    KIDNEY TRANSPLANT  05/21/2016    LEG AMPUTATION THROUGH KNEE  2011    right LE, started as nail puncture leading to diabetic ulcer         Family History:  Family History   Problem Relation Age of Onset    Cancer Father     Diabetes Father     Heart failure Father     Stroke Father     Heart failure Mother     Kidney disease Neg Hx        Social History:  Social History     Social History Main Topics    Smoking status: Former Smoker     Packs/day: 1.00     Years: 40.00     Quit date: 1/11/2013    Smokeless tobacco: Never Used    Alcohol use 3.6 oz/week     6 Cans of beer per week      Comment: 6 beers/week    Drug use: No    Sexual activity: No       ROS   Review of Systems   Constitutional: Positive for diaphoresis.  Negative for chills and fever.   HENT: Negative for congestion, rhinorrhea and sore throat.    Respiratory: Positive for shortness of breath. Negative for cough.    Cardiovascular: Positive for chest pain. Negative for leg swelling.   Gastrointestinal: Negative for abdominal pain, diarrhea, nausea and vomiting.   Genitourinary: Negative for dysuria and hematuria.   Musculoskeletal: Negative for back pain, neck pain and neck stiffness.   Neurological: Negative for dizziness, weakness, light-headedness, numbness and headaches.       Physical Exam      Initial Vitals [02/17/18 1655]   BP Pulse Resp Temp SpO2   (!) 205/100 84 (!) 22 97.9 °F (36.6 °C) 99 %      MAP       135          Physical Exam  Nursing Notes and Vital Signs Reviewed.  Constitutional: Patient is in no acute distress. Well-developed and well-nourished.  Head: Atraumatic. Normocephalic.  Eyes: PERRL. EOM intact. Conjunctivae are not pale. No scleral icterus.  ENT: Mucous membranes are moist. Oropharynx is clear and symmetric.    Neck: Supple. Full ROM. No lymphadenopathy.  Cardiovascular: Regular rate. Regular rhythm. No murmurs, rubs, or gallops. Distal pulses are 2+ and symmetric.  Pulmonary/Chest: No respiratory distress. Occasional wheezing bilaterally. L lateral rib tenderness.  Abdominal: Soft and non-distended.  There is no tenderness.  No rebound, guarding, or rigidity.  Musculoskeletal: Moves all extremities. No obvious deformities. No edema. No calf tenderness. Right BKA  Skin: Warm and diaphoretic.  Neurological:  Alert, awake, and appropriate.  Normal speech.  No acute focal neurological deficits are appreciated.  Psychiatric: Normal affect. Good eye contact. Appropriate in content.    ED Course    Procedures  ED Vital Signs:  Vitals:    02/17/18 1655 02/17/18 1714 02/17/18 1726 02/17/18 1747   BP: (!) 205/100 (!) 199/119 (!) 188/91 (!) 177/92   Pulse: 84 80 69 68   Resp: (!) 22 13 16 18   Temp: 97.9 °F (36.6 °C)      TempSrc: Oral       SpO2: 99% 99% 99% 97%   Weight: 98.5 kg (217 lb 2.5 oz)       02/17/18 1834 02/17/18 1901 02/17/18 1931 02/17/18 1950   BP: (!) 213/95 (!) 202/96 (!) 174/86 (!) 168/80   Pulse: 67 65 63 61   Resp: 19 19 (!) 23 15   Temp:       TempSrc:       SpO2: 99% 98% 97% 98%   Weight:        02/17/18 1955   BP:    Pulse:    Resp:    Temp: 98 °F (36.7 °C)   TempSrc: Oral   SpO2:    Weight:        Abnormal Lab Results:  Labs Reviewed   COMPREHENSIVE METABOLIC PANEL - Abnormal; Notable for the following:        Result Value    CO2 20 (*)     Glucose 180 (*)     BUN, Bld 24 (*)     Creatinine 1.7 (*)     eGFR if  48 (*)     eGFR if non  42 (*)     All other components within normal limits   D DIMER, QUANTITATIVE - Abnormal; Notable for the following:     D-Dimer 0.86 (*)     All other components within normal limits   CBC W/ AUTO DIFFERENTIAL   TROPONIN I   B-TYPE NATRIURETIC PEPTIDE        All Lab Results:  Results for orders placed or performed during the hospital encounter of 02/17/18   CBC auto differential   Result Value Ref Range    WBC 6.28 3.90 - 12.70 K/uL    RBC 5.13 4.60 - 6.20 M/uL    Hemoglobin 15.8 14.0 - 18.0 g/dL    Hematocrit 47.3 40.0 - 54.0 %    MCV 92 82 - 98 fL    MCH 30.8 27.0 - 31.0 pg    MCHC 33.4 32.0 - 36.0 g/dL    RDW 14.3 11.5 - 14.5 %    Platelets 189 150 - 350 K/uL    MPV 10.3 9.2 - 12.9 fL    Gran # (ANC) 3.6 1.8 - 7.7 K/uL    Lymph # 1.9 1.0 - 4.8 K/uL    Mono # 0.7 0.3 - 1.0 K/uL    Eos # 0.0 0.0 - 0.5 K/uL    Baso # 0.01 0.00 - 0.20 K/uL    Gran% 57.5 38.0 - 73.0 %    Lymph% 29.9 18.0 - 48.0 %    Mono% 11.8 4.0 - 15.0 %    Eosinophil% 0.6 0.0 - 8.0 %    Basophil% 0.2 0.0 - 1.9 %    Differential Method Automated    Comprehensive metabolic panel   Result Value Ref Range    Sodium 138 136 - 145 mmol/L    Potassium 4.9 3.5 - 5.1 mmol/L    Chloride 104 95 - 110 mmol/L    CO2 20 (L) 23 - 29 mmol/L    Glucose 180 (H) 70 - 110 mg/dL    BUN, Bld 24 (H) 8 - 23 mg/dL     Creatinine 1.7 (H) 0.5 - 1.4 mg/dL    Calcium 9.8 8.7 - 10.5 mg/dL    Total Protein 7.5 6.0 - 8.4 g/dL    Albumin 3.9 3.5 - 5.2 g/dL    Total Bilirubin 0.5 0.1 - 1.0 mg/dL    Alkaline Phosphatase 131 55 - 135 U/L    AST 16 10 - 40 U/L    ALT 14 10 - 44 U/L    Anion Gap 14 8 - 16 mmol/L    eGFR if African American 48 (A) >60 mL/min/1.73 m^2    eGFR if non African American 42 (A) >60 mL/min/1.73 m^2   Troponin I #1   Result Value Ref Range    Troponin I <0.006 0.000 - 0.026 ng/mL   B-Type natriuretic peptide (BNP)   Result Value Ref Range    BNP 32 0 - 99 pg/mL   D dimer, quantitative   Result Value Ref Range    D-Dimer 0.86 (H) <0.50 mg/L FEU         Imaging Results:  Imaging Results          CTA Chest Non-Coronary - PE Study (Final result)  Result time 02/17/18 18:42:18    Final result by Seferino Laguerre MD (02/17/18 18:42:18)                 Impression:      1.  Negative CT angiogram chest.  No pulmonary embolism.  2.  Normal size heart with suspected left ventricular hypertrophy.  3 vessel coronary calcification.           All CT scans at this facility use dose modulation, iterative reconstruction, and/or weight based dosing when appropriate to reduce radiation dose to as low as reasonably achievable.      Electronically signed by: SEFERINO ALGUERRE  Date:     02/17/18  Time:    18:42              Narrative:    Exam: CTA CHEST NON CORONARY     Indication: Chest pain.  Shortness of breath.    Technique: CT angiogram chest performed with pulmonary embolism protocol with 100 cc Omnipaque 350.  Multiplanar maximum intensity projections.  Soft tissue and lung operative.    Comparison Study: 7/16/2017.    Findings: There is excellent opacification of the pulmonary arteries without intraluminal filling defect to suggest pulmonary embolism.  Normal caliber pulmonary arteries.      Normal caliber aorta without dissection.  There is aortic and great vessel atherosclerosis.      Normal-sized heart, however, the left ventricular  wall is thickened.  Coronary artery calcifications are noted.      There is no mediastinal or hilar lymphadenopathy.  Trachea and bronchi are widely patent.      Stable thin pleural-parenchymal bands of scarring anteromedial right upper lobe and bibasilar distribution.  Otherwise, lungs are clear.      Visualized portion of the upper abdominal viscera is unremarkable.  Mild thoracic spondylosis.  C6-C7 degenerative disc disease.                             X-Ray Chest 1 View (Final result)  Result time 02/17/18 17:50:12    Final result by Seferino Laguerre MD (02/17/18 17:50:12)                 Impression:     Stable chest x-ray.      Electronically signed by: SEFERINO LAGUERRE  Date:     02/17/18  Time:    17:50              Narrative:    Chest x-ray single view    Indication: Shortness of breath    Findings: Comparison study 7/16/2017.  No change.  Normal-sized heart.  Aortic calcification.  No congestion.  Minimal left basilar scarring.  Otherwise, lungs are clear.  Bilateral subclavian/upper extremity atherosclerosis.                             The EKG was ordered, reviewed, and independently interpreted by the ED provider.  Interpretation time: 17:06  Rate: 83 BPM  Rhythm: normal sinus rhythm  Interpretation: Peaked T waves in V4 and V3. No STEMI.  When compared to EKG performed 7/2017, peaked T waves are present.           The Emergency Provider reviewed the vital signs and test results, which are outlined above.    ED Discussion     5:17 PM: Reviewed pt's medical records. Pt had a heart cath in 2015 which showed nonobstructive coronary artery disease.    5:50 PM Pt re-evaluated at this time. Pt reports he has a Hx of a rib fracture. Pt states he fell yesterday and thinks he may have fractured his rib.     6:10 PM: Bedside US of lungs performed by Dr. Estrada shows no signs of pneumothorax. Normal seashore sign.    7:15 PM: Reassessed pt at this time. Discussed with pt all pertinent ED information and results. Discussed  pt dx and plan of tx. Gave pt all f/u and return to the ED instructions. All questions and concerns were addressed at this time. Pt expresses understanding of information and instructions, and is comfortable with plan to discharge. Pt is stable for discharge.    I have discussed with patient and/or family/caretaker chest pain precautions, specifically to return for worsening chest pain, shortness of breath, fever, or any concern.  I have low suspicion for cardiopulmonary, vascular, infectious, respiratory, or other emergent medical condition based on my evaluation in the ED.      ED Medication(s):  Medications   aspirin tablet 325 mg (325 mg Oral Given 2/17/18 1714)   ondansetron injection 4 mg (4 mg Intravenous Given 2/17/18 1714)   metoprolol injection 5 mg (5 mg Intravenous Given 2/17/18 1714)   omnipaque 350 iohexol 100 mL (100 mLs Intravenous Given 2/17/18 1828)   acetaminophen (10 mg/mL) injection 1,000 mg (0 mg Intravenous Stopped 2/1953)   cloNIDine tablet 0.3 mg (0.3 mg Oral Given 2/17/18 1843)       Discharge Medication List as of 2/17/2018  7:14 PM      START taking these medications    Details   !! methadone (DOLOPHINE) 5 MG tablet Take 1 tablet (5 mg total) by mouth every 6 (six) hours as needed for Pain., Starting Sat 2/17/2018, Print       !! - Potential duplicate medications found. Please discuss with provider.          Follow-up Information     Go to  [facility].    Specialty:  Emergency Medicine  Why:  If symptoms worsen  Contact information:  [address]  [facility] [address] [phone]                   Medical Decision Making    Medical Decision Making:   Clinical Tests:   Lab Tests: Ordered and Reviewed  Radiological Study: Reviewed and Ordered  Medical Tests: Reviewed and Ordered           Scribe Attestation:   Scribe #1: I performed the above scribed service and the documentation accurately describes the services I performed. I attest to  the accuracy of the note.    Attending:   Physician Attestation Statement for Scribe #1: I, Avel Estrada MD, personally performed the services described in this documentation, as scribed by Corie Murillo/Lesly José, in my presence, and it is both accurate and complete.          Clinical Impression       ICD-10-CM ICD-9-CM   1. Hypertensive urgency I16.0 401.9   2. Chest pain R07.9 786.50   3. SOB (shortness of breath) R06.02 786.05   4.  donor kidney transplant for DM 16 Z94.0 V42.0   5. History of hepatitis C, s/p successful Rx w/ SVR12 - 2017 Z86.19 V12.09   6. Opioid withdrawal F11.23 292.0     304.00   7. Anxiety attack F41.0 300.01       Disposition:   Disposition: Discharged  Condition: Stable         Avel Estrada MD  18 1104       Avel Estrada MD  18 1106

## 2018-02-18 NOTE — DISCHARGE INSTRUCTIONS
Drink plenty of fluids to make urine look like water     Come back to ER if cannot keep fluid down or with Nausea vomiting or diarrhea or any of the current symptoms get worse.    tylenol 500 mg 2-3 tablets every 8 hours for pain

## 2018-02-18 NOTE — ED NOTES
Pt sitting in bed. IV infusing appropriately. Pt updated on his plan of care. No acute distress noted at this time. Will continue to monitor pt.

## 2018-02-19 ENCOUNTER — TELEPHONE (OUTPATIENT)
Dept: NEUROLOGY | Facility: CLINIC | Age: 63
End: 2018-02-19

## 2018-02-19 ENCOUNTER — TELEPHONE (OUTPATIENT)
Dept: NEPHROLOGY | Facility: CLINIC | Age: 63
End: 2018-02-19

## 2018-02-19 NOTE — TELEPHONE ENCOUNTER
----- Message from Doris Yepez sent at 2/16/2018 12:28 PM CST -----  Good Afternoon!    This patient has been canceled for his MRI exam on Monday. Patient stated that he would like to be scheduled in [place] where they have a open MRI. Please contact the patient to get this scheduled.    Thank You,    Doris VICENTE  Radiology Dept

## 2018-02-19 NOTE — TELEPHONE ENCOUNTER
I called pt. To advise that I faxed orders to the  imaging center of [place] for open MRI, no answer, left voice message

## 2018-02-19 NOTE — TELEPHONE ENCOUNTER
----- Message from Louis Paredes sent at 2/16/2018  9:31 AM CST -----  Contact: [phone]  Pt would like to consult with the nurse about MRI.  Please call back at [phone].  Thx-AH

## 2018-02-26 ENCOUNTER — HOSPITAL ENCOUNTER (EMERGENCY)
Facility: HOSPITAL | Age: 63
Discharge: HOME | End: 2018-02-26
Attending: EMERGENCY MEDICINE
Payer: MEDICARE

## 2018-02-26 VITALS
BODY MASS INDEX: 29.52 KG/M2 | WEIGHT: 211.63 LBS | HEART RATE: 79 BPM | OXYGEN SATURATION: 95 % | DIASTOLIC BLOOD PRESSURE: 81 MMHG | TEMPERATURE: 99 F | RESPIRATION RATE: 33 BRPM | SYSTOLIC BLOOD PRESSURE: 114 MMHG

## 2018-02-26 DIAGNOSIS — N28.9 RENAL INSUFFICIENCY: Primary | ICD-10-CM

## 2018-02-26 DIAGNOSIS — R53.1 WEAKNESS: ICD-10-CM

## 2018-02-26 DIAGNOSIS — R41.82 ALTERED MENTAL STATUS: ICD-10-CM

## 2018-02-26 LAB
ALBUMIN SERPL BCP-MCNC: 3.4 G/DL
ALP SERPL-CCNC: 97 U/L
ALT SERPL W/O P-5'-P-CCNC: 11 U/L
AMPHET+METHAMPHET UR QL: NEGATIVE
ANION GAP SERPL CALC-SCNC: 11 MMOL/L
APAP SERPL-MCNC: <3 UG/ML
AST SERPL-CCNC: 14 U/L
BACTERIA #/AREA URNS HPF: ABNORMAL /HPF
BARBITURATES UR QL SCN>200 NG/ML: NEGATIVE
BASOPHILS # BLD AUTO: 0.01 K/UL
BASOPHILS NFR BLD: 0.1 %
BENZODIAZ UR QL SCN>200 NG/ML: NORMAL
BILIRUB SERPL-MCNC: 0.8 MG/DL
BILIRUB UR QL STRIP: NEGATIVE
BUN SERPL-MCNC: 18 MG/DL
BZE UR QL SCN: NEGATIVE
CALCIUM SERPL-MCNC: 9.6 MG/DL
CANNABINOIDS UR QL SCN: NEGATIVE
CHLORIDE SERPL-SCNC: 109 MMOL/L
CLARITY UR: ABNORMAL
CO2 SERPL-SCNC: 18 MMOL/L
COLOR UR: YELLOW
CREAT SERPL-MCNC: 1.8 MG/DL
CREAT UR-MCNC: 72 MG/DL
DIFFERENTIAL METHOD: ABNORMAL
EOSINOPHIL # BLD AUTO: 0.1 K/UL
EOSINOPHIL NFR BLD: 0.7 %
ERYTHROCYTE [DISTWIDTH] IN BLOOD BY AUTOMATED COUNT: 14.6 %
EST. GFR  (AFRICAN AMERICAN): 45 ML/MIN/1.73 M^2
EST. GFR  (NON AFRICAN AMERICAN): 39 ML/MIN/1.73 M^2
ETHANOL SERPL-MCNC: <10 MG/DL
GLUCOSE SERPL-MCNC: 174 MG/DL
GLUCOSE UR QL STRIP: NEGATIVE
HCT VFR BLD AUTO: 43.8 %
HGB BLD-MCNC: 14.5 G/DL
HGB UR QL STRIP: ABNORMAL
KETONES UR QL STRIP: NEGATIVE
LEUKOCYTE ESTERASE UR QL STRIP: NEGATIVE
LYMPHOCYTES # BLD AUTO: 1.4 K/UL
LYMPHOCYTES NFR BLD: 20.4 %
MCH RBC QN AUTO: 30.8 PG
MCHC RBC AUTO-ENTMCNC: 33.1 G/DL
MCV RBC AUTO: 93 FL
METHADONE UR QL SCN>300 NG/ML: NORMAL
MICROSCOPIC COMMENT: ABNORMAL
MONOCYTES # BLD AUTO: 0.9 K/UL
MONOCYTES NFR BLD: 13.5 %
NEUTROPHILS # BLD AUTO: 4.4 K/UL
NEUTROPHILS NFR BLD: 65.3 %
NITRITE UR QL STRIP: NEGATIVE
OPIATES UR QL SCN: NEGATIVE
PCP UR QL SCN>25 NG/ML: NEGATIVE
PH UR STRIP: 6 [PH] (ref 5–8)
PLATELET # BLD AUTO: 153 K/UL
PMV BLD AUTO: 9.8 FL
POCT GLUCOSE: 180 MG/DL (ref 70–110)
POTASSIUM SERPL-SCNC: 4.3 MMOL/L
PROT SERPL-MCNC: 6.6 G/DL
PROT UR QL STRIP: ABNORMAL
RBC # BLD AUTO: 4.71 M/UL
RBC #/AREA URNS HPF: 5 /HPF (ref 0–4)
SALICYLATES SERPL-MCNC: <5 MG/DL
SODIUM SERPL-SCNC: 138 MMOL/L
SP GR UR STRIP: 1.01 (ref 1–1.03)
SQUAMOUS #/AREA URNS HPF: 4 /HPF
TOXICOLOGY INFORMATION: NORMAL
TROPONIN I SERPL DL<=0.01 NG/ML-MCNC: 0.02 NG/ML
URN SPEC COLLECT METH UR: ABNORMAL
UROBILINOGEN UR STRIP-ACNC: NEGATIVE EU/DL
WBC # BLD AUTO: 6.68 K/UL

## 2018-02-26 PROCEDURE — 99285 EMERGENCY DEPT VISIT HI MDM: CPT | Mod: 25

## 2018-02-26 PROCEDURE — 25000003 PHARM REV CODE 250: Performed by: EMERGENCY MEDICINE

## 2018-02-26 PROCEDURE — 84484 ASSAY OF TROPONIN QUANT: CPT

## 2018-02-26 PROCEDURE — 80307 DRUG TEST PRSMV CHEM ANLYZR: CPT

## 2018-02-26 PROCEDURE — 96361 HYDRATE IV INFUSION ADD-ON: CPT

## 2018-02-26 PROCEDURE — 80053 COMPREHEN METABOLIC PANEL: CPT

## 2018-02-26 PROCEDURE — 82962 GLUCOSE BLOOD TEST: CPT

## 2018-02-26 PROCEDURE — 80320 DRUG SCREEN QUANTALCOHOLS: CPT

## 2018-02-26 PROCEDURE — 36415 COLL VENOUS BLD VENIPUNCTURE: CPT

## 2018-02-26 PROCEDURE — 85025 COMPLETE CBC W/AUTO DIFF WBC: CPT

## 2018-02-26 PROCEDURE — 96374 THER/PROPH/DIAG INJ IV PUSH: CPT

## 2018-02-26 PROCEDURE — 81000 URINALYSIS NONAUTO W/SCOPE: CPT

## 2018-02-26 PROCEDURE — 63600175 PHARM REV CODE 636 W HCPCS: Performed by: EMERGENCY MEDICINE

## 2018-02-26 PROCEDURE — 93010 ELECTROCARDIOGRAM REPORT: CPT | Mod: ,,, | Performed by: INTERNAL MEDICINE

## 2018-02-26 PROCEDURE — 80329 ANALGESICS NON-OPIOID 1 OR 2: CPT

## 2018-02-26 RX ORDER — SODIUM CHLORIDE 9 MG/ML
1000 INJECTION, SOLUTION INTRAVENOUS
Status: COMPLETED | OUTPATIENT
Start: 2018-02-26 | End: 2018-02-26

## 2018-02-26 RX ORDER — NALOXONE HCL 0.4 MG/ML
1 VIAL (ML) INJECTION ONCE
Status: COMPLETED | OUTPATIENT
Start: 2018-02-26 | End: 2018-02-26

## 2018-02-26 RX ADMIN — NALOXONE HYDROCHLORIDE 1 MG: 0.4 INJECTION, SOLUTION INTRAMUSCULAR; INTRAVENOUS; SUBCUTANEOUS at 01:02

## 2018-02-26 RX ADMIN — SODIUM CHLORIDE 1000 ML: 0.9 INJECTION, SOLUTION INTRAVENOUS at 02:02

## 2018-02-26 RX ADMIN — SODIUM CHLORIDE 1000 ML: 0.9 INJECTION, SOLUTION INTRAVENOUS at 11:02

## 2018-02-26 NOTE — ED PROVIDER NOTES
" SCRIBE #1 NOTE: I, Ryan Fitzpatrick, am scribing for, and in the presence of, Ilan Grissom Jr., MD. I have scribed the entire note.      History      Chief Complaint   Patient presents with    Fatigue     pt states everything is "not feeling good", began last night; was recently seen here for the same complaint; possible overdose (xanax/methadone) per mother       Review of patient's allergies indicates:  No Known Allergies     HPI   HPI    2018, 10:09 AM   History obtained from the patient      History of Present Illness: Lavelle Ladd is a 62 y.o. male patient who presents to the Emergency Department for fatigue which onset yesterday.. Symptoms are constant and moderate in severity. Pt states he called EMS because "he's not feeling good." When EMS arrived, pt's mother was overheard asking him "did you take too much again." Per EMS, pt is aggrevated with EMS. Everytime EMS would ask him a question pt said "it's in my chart." Pt just keeps stating "I feel terrible." Pt states he fell out of his wheelchair yesterday. No mitigating or exacerbating factors reported. No associated sx reported. Patient denies any fever, chills, n/v/d, CP, SOB, HA, lightheadedness, dizziness, and all other sxs at this time. No further complaints or concerns at this time.     Pt had 30 days worth of 5 mg methadone filled on 1/15/18. Pt had 60 pills of hydrocodone filled on 18. Pt was given xanax on 18. Pt was given percocet over 1 month ago.     Arrival mode: EMS    PCP: Rishabh Esteban MD       Past Medical History:  Past Medical History:   Diagnosis Date    DINORAH (acute kidney injury) 2016    Arthritis     CHF (congestive heart failure)     Chronic obstructive pulmonary disease 2016    Coronary artery disease involving native coronary artery of native heart without angina pectoris 2016     donor kidney transplant for DM 16     Induction with Thymo x3 and IV solumedrol to total 875mg  Kidney " Biopsy  5/31/2016: 16 glomeruli, ACR type 1 AVR type 2, significant microcirculatory changes, c4d negative, No DSA, 5 to10% fibrosis. Treated with thymo x8 6/21/2016- no rejection      Diastolic heart failure     DM2 (diabetes mellitus, type 2)     Encounter for blood transfusion     ESRD on RRT since 10/2013 10/29/2013    Biopsy proven diabetic nephropathy and lymphoplasmacytic interstitial infiltrate not c/w with AIN (ddx sjogrens or assoc with tamm-horsefall protein extravasation)     GERD (gastroesophageal reflux disease)     History of hepatitis C, s/p successful Rx w/ SVR12 - 4/2017 4/5/2017    Completed 12 weeks harvoni w/ SVR    Hyperlipidemia     Hypertension     Prophylactic immunotherapy     Proteinuria     PVD (peripheral vascular disease) 6/26/2017    RLE BKA CT 12/11/16 Extensive atherosclerotic disease of the aorta and mesenteric arteries.     Reactive airway disease     Renal hypertension     Type 2 diabetes mellitus with diabetic neuropathy, with long-term current use of insulin 12/1/2016    Type 2 diabetes mellitus with hyperglycemia, with long-term current use of insulin     Type 2 diabetes mellitus with retinopathy, with long-term current use of insulin 12/1/2016    Vitamin B12 deficiency        Past Surgical History:  Past Surgical History:   Procedure Laterality Date    av bovine graft      Left UE    AV FISTULA PLACEMENT      left UE    CARDIAC CATHETERIZATION  02/2015    KIDNEY TRANSPLANT  05/21/2016    LEG AMPUTATION THROUGH KNEE  2011    right LE, started as nail puncture leading to diabetic ulcer         Family History:  Family History   Problem Relation Age of Onset    Cancer Father     Diabetes Father     Heart failure Father     Stroke Father     Heart failure Mother     Kidney disease Neg Hx        Social History:  Social History     Social History Main Topics    Smoking status: Former Smoker     Packs/day: 1.00     Years: 40.00     Quit date: 1/11/2013     Smokeless tobacco: Never Used    Alcohol use 3.6 oz/week     6 Cans of beer per week      Comment: 6 beers/week    Drug use: No    Sexual activity: No       ROS   Review of Systems   Constitutional: Positive for fatigue. Negative for chills and fever.   HENT: Negative for sore throat.    Respiratory: Negative for shortness of breath.    Cardiovascular: Negative for chest pain.   Gastrointestinal: Negative for abdominal pain, diarrhea, nausea and vomiting.   Genitourinary: Negative for dysuria.   Musculoskeletal: Negative for back pain.   Skin: Negative for rash.   Neurological: Negative for dizziness, weakness, light-headedness, numbness and headaches.   Hematological: Does not bruise/bleed easily.       Physical Exam      Initial Vitals [02/26/18 1003]   BP Pulse Resp Temp SpO2   93/62 86 18 98.5 °F (36.9 °C) 100 %      MAP       72.33          Physical Exam  Nursing Notes and Vital Signs Reviewed.  Constitutional: Patient is in no acute distress. Older than stated age.   Head: Atraumatic. Normocephalic. White flakes noted right scalp.  Eyes: PERRL. EOM intact. Conjunctivae are not pale. No scleral icterus.  ENT: Mucous membranes are dry. Oropharynx is clear and symmetric.    Neck: Supple. Full ROM. No lymphadenopathy.  Cardiovascular: Regular rate. Regular rhythm. No murmurs, rubs, or gallops. Distal pulses are 2+ and symmetric.  Pulmonary/Chest: No respiratory distress. Clear to auscultation bilaterally. No wheezing or rales.  Abdominal: Soft and non-distended.  There is no tenderness.  No rebound, guarding, or rigidity.  Musculoskeletal: Right BKA. No edema. No calf tenderness.  Skin: Warm and dry.  Neurological: Patient is alert and oriented to person, place and time. Pupils ERRL and EOM normal. Cranial nerves II-XII are intact. Strength is full bilaterally; it is equal and 5/5 in bilateral upper and lower extremities. Speech is clear and normal. Slow to answer questions. Yawning frequently. No acute focal  neurological deficits noted.  Psychiatric: Normal affect. Good eye contact. Appropriate in content.    ED Course    Procedures  ED Vital Signs:  Vitals:    02/26/18 1003 02/26/18 1009 02/26/18 1016 02/26/18 1022   BP: 93/62 116/80     Pulse: 86 82 75 75   Resp: 18 16     Temp: 98.5 °F (36.9 °C)      TempSrc: Oral      SpO2: 100% 99%     Weight: 96 kg (211 lb 10.3 oz)          Abnormal Lab Results:  Labs Reviewed   CBC W/ AUTO DIFFERENTIAL - Abnormal; Notable for the following:        Result Value    RDW 14.6 (*)     All other components within normal limits   COMPREHENSIVE METABOLIC PANEL - Abnormal; Notable for the following:     CO2 18 (*)     Glucose 174 (*)     Creatinine 1.8 (*)     Albumin 3.4 (*)     eGFR if  45 (*)     eGFR if non  39 (*)     All other components within normal limits   URINALYSIS - Abnormal; Notable for the following:     Appearance, UA Hazy (*)     Protein, UA Trace (*)     Occult Blood UA 1+ (*)     All other components within normal limits   ACETAMINOPHEN LEVEL - Abnormal; Notable for the following:     Acetaminophen (Tylenol), Serum <3.0 (*)     All other components within normal limits   SALICYLATE LEVEL - Abnormal; Notable for the following:     Salicylate Lvl <5.0 (*)     All other components within normal limits   URINALYSIS MICROSCOPIC - Abnormal; Notable for the following:     RBC, UA 5 (*)     All other components within normal limits   POCT GLUCOSE - Abnormal; Notable for the following:     POCT Glucose 180 (*)     All other components within normal limits   TROPONIN I   DRUG SCREEN PANEL, URINE EMERGENCY   ALCOHOL,MEDICAL (ETHANOL)   POCT GLUCOSE MONITORING CONTINUOUS        All Lab Results:  Results for orders placed or performed during the hospital encounter of 02/26/18   CBC auto differential   Result Value Ref Range    WBC 6.68 3.90 - 12.70 K/uL    RBC 4.71 4.60 - 6.20 M/uL    Hemoglobin 14.5 14.0 - 18.0 g/dL    Hematocrit 43.8 40.0 - 54.0 %     MCV 93 82 - 98 fL    MCH 30.8 27.0 - 31.0 pg    MCHC 33.1 32.0 - 36.0 g/dL    RDW 14.6 (H) 11.5 - 14.5 %    Platelets 153 150 - 350 K/uL    MPV 9.8 9.2 - 12.9 fL    Gran # (ANC) 4.4 1.8 - 7.7 K/uL    Lymph # 1.4 1.0 - 4.8 K/uL    Mono # 0.9 0.3 - 1.0 K/uL    Eos # 0.1 0.0 - 0.5 K/uL    Baso # 0.01 0.00 - 0.20 K/uL    Gran% 65.3 38.0 - 73.0 %    Lymph% 20.4 18.0 - 48.0 %    Mono% 13.5 4.0 - 15.0 %    Eosinophil% 0.7 0.0 - 8.0 %    Basophil% 0.1 0.0 - 1.9 %    Differential Method Automated    Comprehensive metabolic panel   Result Value Ref Range    Sodium 138 136 - 145 mmol/L    Potassium 4.3 3.5 - 5.1 mmol/L    Chloride 109 95 - 110 mmol/L    CO2 18 (L) 23 - 29 mmol/L    Glucose 174 (H) 70 - 110 mg/dL    BUN, Bld 18 8 - 23 mg/dL    Creatinine 1.8 (H) 0.5 - 1.4 mg/dL    Calcium 9.6 8.7 - 10.5 mg/dL    Total Protein 6.6 6.0 - 8.4 g/dL    Albumin 3.4 (L) 3.5 - 5.2 g/dL    Total Bilirubin 0.8 0.1 - 1.0 mg/dL    Alkaline Phosphatase 97 55 - 135 U/L    AST 14 10 - 40 U/L    ALT 11 10 - 44 U/L    Anion Gap 11 8 - 16 mmol/L    eGFR if African American 45 (A) >60 mL/min/1.73 m^2    eGFR if non African American 39 (A) >60 mL/min/1.73 m^2   Troponin I   Result Value Ref Range    Troponin I 0.016 0.000 - 0.026 ng/mL   Urinalysis   Result Value Ref Range    Specimen UA Urine, Clean Catch     Color, UA Yellow Yellow, Straw, Jessa    Appearance, UA Hazy (A) Clear    pH, UA 6.0 5.0 - 8.0    Specific Gravity, UA 1.015 1.005 - 1.030    Protein, UA Trace (A) Negative    Glucose, UA Negative Negative    Ketones, UA Negative Negative    Bilirubin (UA) Negative Negative    Occult Blood UA 1+ (A) Negative    Nitrite, UA Negative Negative    Urobilinogen, UA Negative <2.0 EU/dL    Leukocytes, UA Negative Negative   Drug screen panel, emergency   Result Value Ref Range    Benzodiazepines Presumptive Positive     Methadone metabolites Presumptive Positive     Cocaine (Metab.) Negative     Opiate Scrn, Ur Negative     Barbiturate Screen,  Ur Negative     Amphetamine Screen, Ur Negative     THC Negative     Phencyclidine Negative     Creatinine, Random Ur 72.0 23.0 - 375.0 mg/dL    Toxicology Information SEE COMMENT    Ethanol   Result Value Ref Range    Alcohol, Medical, Serum <10 <10 mg/dL   Acetaminophen level   Result Value Ref Range    Acetaminophen (Tylenol), Serum <3.0 (L) 10.0 - 20.0 ug/mL   Salicylate level   Result Value Ref Range    Salicylate Lvl <5.0 (L) 15.0 - 30.0 mg/dL   Urinalysis Microscopic   Result Value Ref Range    RBC, UA 5 (H) 0 - 4 /hpf    Bacteria, UA Occasional None-Occ /hpf    Squam Epithel, UA 4 /hpf    Microscopic Comment SEE COMMENT    POCT glucose   Result Value Ref Range    POCT Glucose 180 (H) 70 - 110 mg/dL         Imaging Results:  Imaging Results          X-Ray Chest AP Portable (Final result)  Result time 02/26/18 11:22:42    Final result by Timoteo Lang MD (02/26/18 11:22:42)                 Impression:      Stable exam.  No acute findings.      Electronically signed by: TIMOTEO LANG MD  Date:     02/26/18  Time:    11:22              Narrative:    Exam: XR CHEST AP PORTABLE,    Date:  02/26/18 10:52:00    History: Weakness.  Shortness of breath.    Comparison:  02/17/2018    Findings: Cardiac size is normal.  Aortic arch is calcified.  The lung fields remain grossly clear.                             CT Head Without Contrast (Final result)  Result time 02/26/18 10:53:43    Final result by Kike Ashley Jr., MD (02/26/18 10:53:43)                 Impression:     No acute intracranial CT abnormality.  Chronic lacunar infarct within the right thalamus is unchanged.    All CT scans at this facility use dose modulation, iterative reconstruction, and/or weight base dosing when appropriate to reduce radiation dose to as low as reasonably achievable.       Electronically signed by: KIKE ASHLEY M.D.  Date:     02/26/18  Time:    10:53              Narrative:    EXAM: [id number] HEAD WITHOUT  CONTRAST    CLINICAL HISTORY: slurred speech     TECHNIQUE: Contiguous axial images were obtained from the skull base through the vertex without intravenous contrast.    COMPARISON: 03/16/2015.    FINDINGS: No intracranial hemorrhage. No mass effect or midline shift. No extra axial fluid collections. There is likely a chronic lacunar infarct within the right thalamus.  This is unchanged. The ventricles and sulci are normal in size and configuration. There is no evidence of hydrocephalus. The pineal region is unremarkable. The posterior fossa structures are grossly unremarkable within the limits of CT scan. The paranasal sinuses and mastoid air cells are clear. No fractures are identified. No concerning osseous lesions.                             The EKG was ordered, reviewed, and independently interpreted by the ED provider.  Interpretation time: 10:11  Rate: 81 BPM  Rhythm: normal sinus rhythm  Interpretation: Septal infarct. No STEMI.           The Emergency Provider reviewed the vital signs and test results, which are outlined above.    ED Discussion     1:39 PM: No significant change to pt condition after narcan.    4:10 PM: Reassessed pt at this time.  Pt is able to answer questions appropriately. Pt is more awake, alert, and oriented. Discussed with pt all pertinent ED information and results. Discussed pt dx and plan of tx. Gave pt all f/u and return to the ED instructions. All questions and concerns were addressed at this time. Pt expresses understanding of information and instructions, and is comfortable with plan to discharge. Pt is stable for discharge.      4:41 PM Patient is awake, alert, oriented.  GCS =15.  He was counseled on over medication. He was counseled to avoid alcohol, sleeping aids.   Labs are at baseline level.        I discussed with patient and/or family/caretaker that evaluation in the ED does not suggest any emergent or life threatening medical conditions requiring immediate  intervention beyond what was provided in the ED, and I believe patient is safe for discharge.  Regardless, an unremarkable evaluation in the ED does not preclude the development or presence of a serious of life threatening condition. As such, patient was instructed to return immediately for any worsening or change in current symptoms.      ED Medication(s):  Medications   sodium chloride 0.9% bolus 1,000 mL (0 mLs Intravenous Stopped 2/26/18 1415)   naloxone 0.4 mg/mL injection 1 mg (1 mg Intravenous Given 2/26/18 1302)   0.9%  NaCl infusion (1,000 mLs Intravenous New Bag 2/26/18 1454)       New Prescriptions    No medications on file       Follow-up Information     Rishabh Esteban MD. Schedule an appointment as soon as possible for a visit in 2 days.    Specialty:  Family Medicine  Why:  Rturn to the ED for:  weakness, confusion, slurred, or worsening condition. Avoid sedating meds, alcohol, pain medications, anxiety  medications.  Contact information:  [address] [facility] [address]  [facility] [address]  [phone]                     Medical Decision Making    Medical Decision Making:   Clinical Tests:   Lab Tests: Ordered and Reviewed  Radiological Study: Ordered and Reviewed  Medical Tests: Ordered and Reviewed           Scribe Attestation:   Scribe #1: I performed the above scribed service and the documentation accurately describes the services I performed. I attest to the accuracy of the note.    Attending:   Physician Attestation Statement for Scribe #1: I, Ilan Grissom Jr., MD, personally performed the services described in this documentation, as scribed by Ryan Fitzpatrick, in my presence, and it is both accurate and complete.          Clinical Impression       ICD-10-CM ICD-9-CM   1. Renal insufficiency N28.9 593.9   2. Weakness R53.1 780.79   3. Altered mental status R41.82 780.97       Disposition:   Disposition: Discharged  Condition: Stable         Ilan Grissom Jr., MD  02/26/18 [id number]       Viviana REHMAN  DO Demetri  02/26/18 1647

## 2018-02-26 NOTE — ED NOTES
"Patient urinated on floor, on bed, and in bedpan at this time, when patient was asked if he urinated on the floor on purpose, patient said "I'm in pain."  "

## 2018-02-26 NOTE — ED NOTES
"Patient requesting pain medication. Patient informed that he needed to give a urine specimen at this time. Patient raises voice at MAYKEL Molina, stating "I need some pain medication!". Patient asked how he used the restroom at home, stated he gets up and uses the restroom; when asked why patient soiled himself and his bed, patient stated "I was hurting". Patient instructed to call for assistance to use restroom and bedside commode.  "

## 2018-02-27 ENCOUNTER — PES CALL (OUTPATIENT)
Dept: ADMINISTRATIVE | Facility: CLINIC | Age: 63
End: 2018-02-27

## 2018-02-27 NOTE — ED NOTES
Discharge instructions explained to patient. Patient verbalizes understanding. Patient and discharge paperwork escorted to registration desk by RN at this time. Patient instructed to wait in lobby for family to pick him up. Discharge paperwork given to registration for completion of discharge.

## 2018-03-14 ENCOUNTER — TELEPHONE (OUTPATIENT)
Dept: NEPHROLOGY | Facility: CLINIC | Age: 63
End: 2018-03-14

## 2018-03-14 NOTE — TELEPHONE ENCOUNTER
----- Message from Griselda Garcia sent at 3/14/2018 10:03 AM CDT -----  Contact: Pt  Pt calling in regards to a missed call from Jessa and would like a call back at .[phone] (home)

## 2018-03-15 ENCOUNTER — TELEPHONE (OUTPATIENT)
Dept: NEPHROLOGY | Facility: CLINIC | Age: 63
End: 2018-03-15

## 2018-03-15 NOTE — TELEPHONE ENCOUNTER
----- Message from Jonathan Zaragoza sent at 3/15/2018  2:29 PM CDT -----  Contact: self [phone]  Returning call,please call back at [phone]. Md Rachel

## 2018-03-15 NOTE — TELEPHONE ENCOUNTER
Returned call to patient. He states that he has not be able to sleep due to sever numbness in legs, feet and hands. Patient is scheduled to see Neurology next month that you referred him to, but he would like you to call something in until then so he can rest at night.

## 2018-03-19 ENCOUNTER — OFFICE VISIT (OUTPATIENT)
Dept: NEUROLOGY | Facility: CLINIC | Age: 63
End: 2018-03-19
Payer: MEDICARE

## 2018-03-19 ENCOUNTER — TELEPHONE (OUTPATIENT)
Dept: NEPHROLOGY | Facility: CLINIC | Age: 63
End: 2018-03-19

## 2018-03-19 ENCOUNTER — NURSE TRIAGE (OUTPATIENT)
Dept: ADMINISTRATIVE | Facility: CLINIC | Age: 63
End: 2018-03-19

## 2018-03-19 VITALS
HEIGHT: 70 IN | WEIGHT: 213.88 LBS | HEART RATE: 82 BPM | SYSTOLIC BLOOD PRESSURE: 110 MMHG | BODY MASS INDEX: 30.62 KG/M2 | DIASTOLIC BLOOD PRESSURE: 60 MMHG

## 2018-03-19 DIAGNOSIS — R20.2 NUMBNESS AND TINGLING: ICD-10-CM

## 2018-03-19 DIAGNOSIS — M51.37 DISC DISEASE, DEGENERATIVE, LUMBAR OR LUMBOSACRAL: ICD-10-CM

## 2018-03-19 DIAGNOSIS — S22.42XD CLOSED FRACTURE OF MULTIPLE RIBS OF LEFT SIDE WITH ROUTINE HEALING, SUBSEQUENT ENCOUNTER: ICD-10-CM

## 2018-03-19 DIAGNOSIS — M47.14 OSTEOARTHRITIS OF THORACIC SPINE WITH MYELOPATHY: ICD-10-CM

## 2018-03-19 DIAGNOSIS — Z86.19 HISTORY OF HEPATITIS C: ICD-10-CM

## 2018-03-19 DIAGNOSIS — R20.0 NUMBNESS AND TINGLING: ICD-10-CM

## 2018-03-19 DIAGNOSIS — E11.42 DIABETIC POLYNEUROPATHY ASSOCIATED WITH TYPE 2 DIABETES MELLITUS: Primary | ICD-10-CM

## 2018-03-19 DIAGNOSIS — F51.02 ADJUSTMENT INSOMNIA: ICD-10-CM

## 2018-03-19 DIAGNOSIS — Z79.60 LONG-TERM USE OF IMMUNOSUPPRESSANT MEDICATION: ICD-10-CM

## 2018-03-19 PROCEDURE — 3074F SYST BP LT 130 MM HG: CPT | Mod: CPTII,S$GLB,, | Performed by: PSYCHIATRY & NEUROLOGY

## 2018-03-19 PROCEDURE — 3045F PR MOST RECENT HEMOGLOBIN A1C LEVEL 7.0-9.0%: CPT | Mod: CPTII,S$GLB,, | Performed by: PSYCHIATRY & NEUROLOGY

## 2018-03-19 PROCEDURE — 3078F DIAST BP <80 MM HG: CPT | Mod: CPTII,S$GLB,, | Performed by: PSYCHIATRY & NEUROLOGY

## 2018-03-19 PROCEDURE — 99215 OFFICE O/P EST HI 40 MIN: CPT | Mod: S$GLB,,, | Performed by: PSYCHIATRY & NEUROLOGY

## 2018-03-19 PROCEDURE — 99999 PR PBB SHADOW E&M-EST. PATIENT-LVL IV: CPT | Mod: PBBFAC,,, | Performed by: PSYCHIATRY & NEUROLOGY

## 2018-03-19 RX ORDER — ZOLPIDEM TARTRATE 5 MG/1
10 TABLET ORAL NIGHTLY PRN
Qty: 30 TABLET | Refills: 1 | Status: ON HOLD | OUTPATIENT
Start: 2018-03-19 | End: 2018-09-25 | Stop reason: HOSPADM

## 2018-03-19 RX ORDER — GABAPENTIN 300 MG/1
CAPSULE ORAL
Qty: 120 CAPSULE | Refills: 11 | Status: SHIPPED | OUTPATIENT
Start: 2018-03-19 | End: 2018-07-06

## 2018-03-19 NOTE — TELEPHONE ENCOUNTER
Reason for Disposition   Nursing judgment    Protocols used: ST NO PROTOCOL AVAILABLE - SICK ADULT-A-[place]    Pt states that he is sleep deprived from his neuropathy and is hallucinating. Advised ER. No BPA used

## 2018-03-19 NOTE — PROGRESS NOTES
Follow up:  1. Back pain   2. Left foot numbness     SUBJECTIVE:       HPI:   Today he is saying that he has stopped taking all pain medications.  He can not sleep and has increased numbness and tingling sensation  In hands , mainly in the left and also in the left foot. He has disc problems and on his last visit , he was referred to to neurosurgery. He is telling that he does not have pain now.     Past Medical History:   Diagnosis Date    DINORAH (acute kidney injury) 2016    Arthritis     CHF (congestive heart failure)     Chronic obstructive pulmonary disease 2016    Coronary artery disease involving native coronary artery of native heart without angina pectoris 2016     donor kidney transplant for DM 16     Induction with Thymo x3 and IV solumedrol to total 875mg  Kidney Biopsy  2016: 16 glomeruli, ACR type 1 AVR type 2, significant microcirculatory changes, c4d negative, No DSA, 5 to10% fibrosis. Treated with thymo x8 2016- no rejection      Diastolic heart failure     DM2 (diabetes mellitus, type 2)     Encounter for blood transfusion     ESRD on RRT since 10/2013 10/29/2013    Biopsy proven diabetic nephropathy and lymphoplasmacytic interstitial infiltrate not c/w with AIN (ddx sjogrens or assoc with tamm-horsefall protein extravasation)     GERD (gastroesophageal reflux disease)     History of hepatitis C, s/p successful Rx w/ SVR - 2017    Completed 12 weeks harvoni w/ SVR    Hyperlipidemia     Hypertension     Prophylactic immunotherapy     Proteinuria     PVD (peripheral vascular disease) 2017    RLE BKA CT 16 Extensive atherosclerotic disease of the aorta and mesenteric arteries.     Reactive airway disease     Renal hypertension     Type 2 diabetes mellitus with diabetic neuropathy, with long-term current use of insulin 2016    Type 2 diabetes mellitus with hyperglycemia, with long-term current use of insulin     Type 2  diabetes mellitus with retinopathy, with long-term current use of insulin 12/1/2016    Vitamin B12 deficiency      Past Surgical History:   Procedure Laterality Date    av bovine graft      Left UE    AV FISTULA PLACEMENT      left UE    CARDIAC CATHETERIZATION  02/2015    KIDNEY TRANSPLANT  05/21/2016    LEG AMPUTATION THROUGH KNEE  2011    right LE, started as nail puncture leading to diabetic ulcer     Family History   Problem Relation Age of Onset    Cancer Father     Diabetes Father     Heart failure Father     Stroke Father     Heart failure Mother     Kidney disease Neg Hx      Social History   Substance Use Topics    Smoking status: Former Smoker     Packs/day: 1.00     Years: 40.00     Quit date: 1/11/2013    Smokeless tobacco: Never Used    Alcohol use 3.6 oz/week     6 Cans of beer per week      Comment: 6 beers/week     Review of patient's allergies indicates:   Allergen Reactions    Tylenol [acetaminophen]      Told not to take per Transplant Team       Review of Systems   Constitutional: Negative for fever and weight loss.   HENT: Negative for ear pain, hearing loss and tinnitus.    Eyes: Negative for blurred vision, double vision, photophobia, pain, discharge and redness.   Respiratory: Negative for cough and shortness of breath.    Cardiovascular: Positive for chest pain. Negative for palpitations, claudication and leg swelling.   Gastrointestinal: Negative for abdominal pain, heartburn, nausea and vomiting.   Genitourinary: Negative for dysuria, flank pain, frequency and urgency.   Musculoskeletal: Positive for back pain, falls, joint pain and myalgias. Negative for neck pain.   Skin: Negative for itching and rash.   Neurological: Positive for tingling and sensory change. Negative for dizziness, tremors, speech change, focal weakness, seizures, loss of consciousness, weakness and headaches.   Endo/Heme/Allergies: Does not bruise/bleed easily.   Psychiatric/Behavioral: Negative for  depression, hallucinations, memory loss and suicidal ideas. The patient is not nervous/anxious and does not have insomnia.        OBJECTIVE:     Physical Exam   Constitutional: He is oriented to person, place, and time. He appears well-developed and well-nourished. No distress.   HENT:   Head: Normocephalic.   Right Ear: External ear normal.   Left Ear: External ear normal.   Mouth/Throat: Oropharynx is clear and moist.   Eyes: Conjunctivae and EOM are normal. Pupils are equal, round, and reactive to light.   Neck: Normal range of motion. Neck supple. No tracheal deviation present. No thyromegaly present.   Cardiovascular: Regular rhythm, normal heart sounds and intact distal pulses.    Pulmonary/Chest: Effort normal and breath sounds normal. No respiratory distress.   Abdominal: Soft. Bowel sounds are normal. There is no tenderness.   Musculoskeletal: He exhibits no edema or tenderness.   Lymphadenopathy:     He has no cervical adenopathy.   Neurological: He is alert and oriented to person, place, and time. He has normal strength. He displays no tremor and normal reflexes. A sensory deficit is present. No cranial nerve deficit. He exhibits normal muscle tone. Gait abnormal. Coordination normal. He displays no Babinski's sign on the right side. He displays no Babinski's sign on the left side.   Reflex Scores:       Tricep reflexes are 0 on the right side and 0 on the left side.       Bicep reflexes are 0 on the right side and 0 on the left side.       Brachioradialis reflexes are 0 on the right side and 0 on the left side.       Patellar reflexes are 0 on the left side.       Achilles reflexes are 0 on the left side.  He has decreased sensation in his left foot. Left side of the upper chest is tender and hurts on movements after the fall couple of days ago on the ice.  He is on artificial limb on the right side.   Skin: Skin is warm and dry. No rash noted. He is not diaphoretic. No erythema. No pallor.    Psychiatric: He has a normal mood and affect. His behavior is normal. Judgment and thought content normal.         Strength  Deltoids Triceps Biceps Wrist Extension Wrist Flexion Hand    Upper: R 5/5 5/5 5/5 5/5 5/5 5/5    L 5/5 5/5 5/5 5/5 5/5 5/5     Iliopsoas Quadriceps Knee  Flexion Tibialis  anterior Gastro- cnemius EHL   Lower: R 5/5 5/5        L 5/5 5/5 5/5 5/5 5/5 5/5     Laboratory:  Lab Results   Component Value Date    WBC 4.85 12/18/2017    HGB 14.8 12/18/2017    HCT 45.9 12/18/2017     12/18/2017    CHOL 192 12/04/2017    TRIG 99 12/04/2017    HDL 49 12/04/2017    ALT 12 12/18/2017    AST 14 12/18/2017     12/18/2017    K 4.3 12/18/2017     12/18/2017    CREATININE 1.7 (H) 12/18/2017    BUN 29 (H) 12/18/2017    CO2 26 12/18/2017    TSH 1.329 05/08/2017    PSA 0.29 08/25/2015    INR 1.0 06/15/2017    HGBA1C 7.1 (H) 11/03/2017         Diagnostic Results:    MRI of L-spine: 5/28/2016      Impression      Mild to moderate multilevel lumbar spine degenerative disease, most prominent at L3-4 were there is moderate central canal narrowing and moderate bilateral neural foramina stenosis.     Xray of the left Chest: 1/25/2018  Left rib series    Comparison: None.    Findings: There are acute fractures involving the anterolateral 7th and 8th ribs.  No pleural effusion or pneumothorax.  Vascular calcifications noted.  Surgical clips noted within the left arm.    New scanned MRI report is in Media file in outside radiology reports.    ASSESSMENT/PLAN:     Problem List Items Addressed This Visit     Long-term use of immunosuppressant medication    Osteoarthritis of thoracic spine with myelopathy    History of hepatitis C, s/p successful Rx w/ SVR12 - 4/2017    Overview     Completed 12 weeks harvoni w/ SVR         Current Assessment & Plan     Hepatitis C , can cause sensory neuropathy .         Diabetic polyneuropathy - Primary    Overview     He has chronic diabetic neuropathy which is  causing numb feeling in his feet . No feeling in the toes in the left foot.           Disc disease, degenerative, lumbar or lumbosacral    Overview     He has significant degenerative disc disease and its complications.         Numbness and tingling    Current Assessment & Plan     Due to neuropathy.         Adjustment insomnia      Other Visit Diagnoses     Closed fracture of multiple ribs of left side with routine healing, subsequent encounter                Patient Active Problem List   Diagnosis    Renal hypertension    GERD (gastroesophageal reflux disease)    Peptic ulcer disease    Malignant HTN with heart disease, w/o CHF, with chronic kidney disease    Essential hypertension    Acute diastolic heart failure    Gastroparesis     donor kidney transplant for DM 16    Long-term use of immunosuppressant medication    Prophylactic immunotherapy    Adverse effect of glucocorticoids and synthetic analogues, sequela    Osteoarthritis of lumbar spine    Osteoarthritis of thoracic spine with myelopathy    Type 2 diabetes mellitus with hyperglycemia, with long-term current use of insulin    Coronary artery disease involving native coronary artery of native heart without angina pectoris    Hyperlipidemia    Chronic obstructive pulmonary disease    Ulnar neuropathy    Chronic kidney disease (CKD), stage III (moderate)    Reactive airway disease    Kidney transplant rejection    Type 2 diabetes mellitus with retinopathy, with long-term current use of insulin    Type 2 diabetes mellitus with diabetic neuropathy, with long-term current use of insulin    H/O amputation    Cavitary lesion of lung    Opacity of lung on imaging study    Bacteremia due to Gram-negative bacteria    ESBL (extended spectrum beta-lactamase) producing bacteria infection    History of hepatitis C, s/p successful Rx w/ SVR - 2017    Kidney transplant recipient    Intractable vomiting with nausea    PVD  (peripheral vascular disease)        Plan:  Ambien and increase Gabapentin 3oomg po , bid and 2 at night.  Keep follow up with Neurosurgery.  Time spent: 30 minutes in face to face discussion concerning diagnosis, prognosis, review of lab and test results, benefits of treatment as well as management of disease, counseling of patient and coordination of care between various health care providers . Greater than half the time spent was used for coordination of care and counseling of patient. This note may have some spelling or wording mistakes which might have been overlooked during proof reading.

## 2018-03-19 NOTE — TELEPHONE ENCOUNTER
----- Message from Dea Cervantes MA sent at 3/19/2018  8:37 AM CDT -----  Contact: pt    Pt wants to talk to you/please advise/  ----- Message -----  From: Mariposa Centeno  Sent: 3/19/2018   8:04 AM  To: Sean DURAN Staff    Call pt regarding appt. Pt states that he has been talking to Jessa and need to talk back to her.  [phone] (home)

## 2018-03-26 ENCOUNTER — TELEPHONE (OUTPATIENT)
Dept: NEPHROLOGY | Facility: CLINIC | Age: 63
End: 2018-03-26

## 2018-03-26 NOTE — TELEPHONE ENCOUNTER
Returned call to pt. Who states that he is in pain the numbness and tingling is not better with neurontin. Please advise.

## 2018-03-26 NOTE — TELEPHONE ENCOUNTER
----- Message from Dea Cervantes MA sent at 3/26/2018  9:52 AM CDT -----  Contact: Self-[phone]  I know you have been talking to this person, was wondering if you could take care of this one message for me/thanks in advanced..  ----- Message -----  From: Louis Paredes  Sent: 3/26/2018   8:00 AM  To: Sean DURAN Staff    Pt  Would like to consult with the nurse about Rx medication and side effects.  Please call back at [phone].  Thx-AH

## 2018-03-26 NOTE — TELEPHONE ENCOUNTER
----- Message from Yari Bob sent at 3/26/2018 12:38 PM CDT -----  Contact: pt  Pt states the medication the doctor proscribed is not working and he can be reached at [phone] Thanks

## 2018-03-27 DIAGNOSIS — G89.29 CHRONIC BACK PAIN, UNSPECIFIED BACK LOCATION, UNSPECIFIED BACK PAIN LATERALITY: Primary | ICD-10-CM

## 2018-03-27 DIAGNOSIS — M54.9 CHRONIC BACK PAIN, UNSPECIFIED BACK LOCATION, UNSPECIFIED BACK PAIN LATERALITY: Primary | ICD-10-CM

## 2018-03-27 NOTE — TELEPHONE ENCOUNTER
Called pt. To schedule with pain management advised of availablity in June, pt. Declines stating he will wait to see neurosurgery in April.

## 2018-04-11 ENCOUNTER — TELEPHONE (OUTPATIENT)
Dept: NEUROLOGY | Facility: CLINIC | Age: 63
End: 2018-04-11

## 2018-04-11 NOTE — TELEPHONE ENCOUNTER
----- Message from Marimar Boyle LPN sent at 4/10/2018 12:56 PM CDT -----  Pt was not able to have MRI d/t claustrophobia. Pt is requesting that he have an open MRI. Once he has that, we will be able to reschedule his appt with Dr. Berrios. Thanks in advance.

## 2018-04-18 ENCOUNTER — TELEPHONE (OUTPATIENT)
Dept: NEUROLOGY | Facility: CLINIC | Age: 63
End: 2018-04-18

## 2018-04-18 ENCOUNTER — TELEPHONE (OUTPATIENT)
Dept: NEUROSURGERY | Facility: CLINIC | Age: 63
End: 2018-04-18

## 2018-04-18 NOTE — TELEPHONE ENCOUNTER
----- Message from Anuradha Felix sent at 4/18/2018 10:23 AM CDT -----  Contact: Patient  1. What is the name of the medication you are requesting? Generic Ambien  2. What is the dose? 5mg  3. How do you take the medication? Orally, topically, etc? orally  4. How often do you take this medication?   5. Do you need a 30 day or 90 day supply? 90  6. How many refills are you requesting?   7. What is your preferred pharmacy and location of the pharmacy? [facility] on [address]  8. Who can we contact with further questions? Patient,please call when sent to pharmacy, [phone]    Also needs to know when his open MRI will be done, please call him back at [phone]

## 2018-04-18 NOTE — TELEPHONE ENCOUNTER
Attempted to call pt back, left informing to call the office to schedule an appt. Pt has no MRI in system, if he has had outside MRI, will need to bring disc to appt. Cannot discuss surgery d/t he has not been seen in our clinic at this time.     '----- Message from Anuradha Felix sent at 4/18/2018 10:27 AM CDT -----  Contact: Chan  Patient wants to know when his surgery will be, please call him back at [phone]. Thank you

## 2018-04-18 NOTE — TELEPHONE ENCOUNTER
----- Message from Benny Menon MD sent at 4/18/2018 12:49 PM CDT -----  Contact: Patient  Noted.  Do I need to do anything else or can Dr. Estrada handle it next Monday?  ----- Message -----  From: Janna Sorto LPN  Sent: 4/18/2018  10:45 AM  To: Benny Menon MD    I scheduled him for open mri 4/24  ----- Message -----  From: Anuradha Felix  Sent: 4/18/2018  10:23 AM  To: Sean DURAN Staff    1. What is the name of the medication you are requesting? Generic Ambien  2. What is the dose? 5mg  3. How do you take the medication? Orally, topically, etc? orally  4. How often do you take this medication?   5. Do you need a 30 day or 90 day supply? 90  6. How many refills are you requesting?   7. What is your preferred pharmacy and location of the pharmacy? [facility] on [address]  8. Who can we contact with further questions? Patient,please call when sent to pharmacy, [phone]    Also needs to know when his open MRI will be done, please call him back at [phone]

## 2018-04-18 NOTE — TELEPHONE ENCOUNTER
Called to inform that he will have to wait for  to return to handle this ambien refill. Dr dalton is not comfortable writing that with all his other meds. Sf4/18 1:34pm

## 2018-04-19 ENCOUNTER — TELEPHONE (OUTPATIENT)
Dept: NEUROLOGY | Facility: CLINIC | Age: 63
End: 2018-04-19

## 2018-04-19 ENCOUNTER — TELEPHONE (OUTPATIENT)
Dept: NEUROSURGERY | Facility: CLINIC | Age: 63
End: 2018-04-19

## 2018-04-19 NOTE — TELEPHONE ENCOUNTER
Spoke with pt and informed that we have not seen pt as of yet. MRI was ordered by Dr. Estrada's office. Transferred pt to his office.    ----- Message from Sheri Bernardo sent at 4/19/2018 10:02 AM CDT -----  Contact: Pt  Please give pt a  Call at ..[phone] (home) regarding the MRI not being covered through his insurance.

## 2018-04-19 NOTE — TELEPHONE ENCOUNTER
Called him to say we will get somewhere tomorrow with open mri as we are not in [facility] today. Sf 4/19

## 2018-04-19 NOTE — TELEPHONE ENCOUNTER
----- Message from Anuradha Felix sent at 4/19/2018 11:00 AM CDT -----  Contact: Patient  Patient states that he could not do MRI, because they are not in network. Please call him back at [phone]. Thank you

## 2018-04-23 ENCOUNTER — TELEPHONE (OUTPATIENT)
Dept: NEUROLOGY | Facility: CLINIC | Age: 63
End: 2018-04-23

## 2018-04-23 NOTE — TELEPHONE ENCOUNTER
Called a few times to find an open mri for him d/t claustrophia but unable to find due to his [employer] ins. He wants [facility] appt so I booked for 4/24 10am called to inform him of this. Sf 4/23 2;05pm sf

## 2018-04-24 ENCOUNTER — HOSPITAL ENCOUNTER (OUTPATIENT)
Dept: RADIOLOGY | Facility: HOSPITAL | Age: 63
Discharge: HOME | End: 2018-04-24
Attending: PSYCHIATRY & NEUROLOGY
Payer: MEDICARE

## 2018-04-24 PROCEDURE — 72148 MRI LUMBAR SPINE W/O DYE: CPT | Mod: 26,,, | Performed by: RADIOLOGY

## 2018-04-24 PROCEDURE — 72148 MRI LUMBAR SPINE W/O DYE: CPT | Mod: TC

## 2018-04-25 ENCOUNTER — TELEPHONE (OUTPATIENT)
Dept: NEUROLOGY | Facility: CLINIC | Age: 63
End: 2018-04-25

## 2018-04-25 NOTE — TELEPHONE ENCOUNTER
----- Message from Charlette Estrada MD sent at 4/25/2018  7:53 AM CDT -----  Call the patient with result. No significant changes from MRI of 5/28/2016. There is problem but not serious. Keep up Neurosurgery appointment.

## 2018-04-30 ENCOUNTER — INITIAL CONSULT (OUTPATIENT)
Dept: NEUROSURGERY | Facility: CLINIC | Age: 63
End: 2018-04-30
Payer: MEDICARE

## 2018-04-30 VITALS
BODY MASS INDEX: 31.34 KG/M2 | SYSTOLIC BLOOD PRESSURE: 169 MMHG | HEIGHT: 71 IN | HEART RATE: 78 BPM | WEIGHT: 223.88 LBS | DIASTOLIC BLOOD PRESSURE: 82 MMHG

## 2018-04-30 DIAGNOSIS — M48.062 LUMBAR STENOSIS WITH NEUROGENIC CLAUDICATION: ICD-10-CM

## 2018-04-30 PROCEDURE — 99999 PR PBB SHADOW E&M-EST. PATIENT-LVL V: CPT | Mod: PBBFAC,,, | Performed by: NEUROLOGICAL SURGERY

## 2018-04-30 PROCEDURE — 3079F DIAST BP 80-89 MM HG: CPT | Mod: CPTII,S$GLB,, | Performed by: NEUROLOGICAL SURGERY

## 2018-04-30 PROCEDURE — 3077F SYST BP >= 140 MM HG: CPT | Mod: CPTII,S$GLB,, | Performed by: NEUROLOGICAL SURGERY

## 2018-04-30 PROCEDURE — 99205 OFFICE O/P NEW HI 60 MIN: CPT | Mod: S$GLB,,, | Performed by: NEUROLOGICAL SURGERY

## 2018-04-30 RX ORDER — ACETAMINOPHEN 500 MG
15 TABLET ORAL NIGHTLY PRN
COMMUNITY
End: 2018-05-25 | Stop reason: ALTCHOICE

## 2018-04-30 NOTE — PROGRESS NOTES
Neurosurgery History and Physical    Patient ID: Lavelle Ladd is a 62 y.o. male.    Chief Complaint   Patient presents with    Lumbar Spine Pain (L-Spine)     pt states fell 1 year ago and injured back, was seeing pain management and was prescribed pain medication and pt states he was scared to become addicted, so he stopped taking it, pt states pain is from mid lumbar area into LLE causing numbness and tingling, even causing phantom pains into RLE which has been amputated, denies bowel and bladder issues, pt states pain disrupts sleep was seeing PT then had kidney transplant and stopped, was seeing Dr. Rees who offered discectomy of L3-L4 but pt refused         Review of Systems   Constitutional: Positive for activity change.   HENT: Negative.    Eyes: Negative.    Respiratory: Negative.    Cardiovascular: Negative.    Gastrointestinal: Negative.    Endocrine: Negative.    Genitourinary: Negative.    Musculoskeletal: Positive for back pain and gait problem.   Skin: Negative.    Allergic/Immunologic: Negative.    Neurological: Positive for numbness. Negative for weakness.   Hematological: Negative.    Psychiatric/Behavioral: Negative.        Past Medical History:   Diagnosis Date    DINORAH (acute kidney injury) 2016    Arthritis     CHF (congestive heart failure)     Chronic obstructive pulmonary disease 2016    Coronary artery disease involving native coronary artery of native heart without angina pectoris 2016     donor kidney transplant for DM 16     Induction with Thymo x3 and IV solumedrol to total 875mg  Kidney Biopsy  2016: 16 glomeruli, ACR type 1 AVR type 2, significant microcirculatory changes, c4d negative, No DSA, 5 to10% fibrosis. Treated with thymo x8 2016- no rejection      Diastolic heart failure     DM2 (diabetes mellitus, type 2)     Encounter for blood transfusion     ESRD on RRT since 10/2013 10/29/2013    Biopsy proven diabetic nephropathy and  "lymphoplasmacytic interstitial infiltrate not c/w with AIN (ddx sjogrens or assoc with tamm-horsefall protein extravasation)     GERD (gastroesophageal reflux disease)     History of hepatitis C, s/p successful Rx w/ SVR12 - 4/2017 4/5/2017    Completed 12 weeks harvoni w/ SVR    Hyperlipidemia     Hypertension     Prophylactic immunotherapy     Proteinuria     PVD (peripheral vascular disease) 6/26/2017    RLE BKA CT 12/11/16 Extensive atherosclerotic disease of the aorta and mesenteric arteries.     Reactive airway disease     Renal hypertension     Type 2 diabetes mellitus with diabetic neuropathy, with long-term current use of insulin 12/1/2016    Type 2 diabetes mellitus with hyperglycemia, with long-term current use of insulin     Type 2 diabetes mellitus with retinopathy, with long-term current use of insulin 12/1/2016    Vitamin B12 deficiency      Social History     Social History    Marital status: Single     Spouse name: N/A    Number of children: N/A    Years of education: N/A     Occupational History    Disabled      Disabled     Social History Main Topics    Smoking status: Former Smoker     Packs/day: 1.00     Years: 40.00     Quit date: 1/11/2013    Smokeless tobacco: Never Used    Alcohol use 3.6 oz/week     6 Cans of beer per week      Comment: 6 beers/week    Drug use: No    Sexual activity: No     Other Topics Concern    Not on file     Social History Narrative    Lives alone. Retired from construction and various jobs, now retired. Would like to return to work PT to alleviate boredom.     Family History   Problem Relation Age of Onset    Cancer Father     Diabetes Father     Heart failure Father     Stroke Father     Heart failure Mother     Kidney disease Neg Hx      Review of patient's allergies indicates:  No Known Allergies    Current Outpatient Prescriptions:     BD INSULIN PEN NEEDLE UF SHORT 31 gauge x 5/16" Ndselena, , Disp: , Rfl:     BD INSULIN SYRINGE " "ULTRA-FINE 1 mL 31 gauge x 5/16 Syrg, , Disp: , Rfl:     blood sugar diagnostic Strp, 1 each by Misc.(Non-Drug; Combo Route) route 3 (three) times daily., Disp: 100 each, Rfl: 11    blood-glucose meter kit, Use as instructed, Disp: 1 each, Rfl: 0    budesonide-formoterol 160-4.5 mcg (SYMBICORT) 160-4.5 mcg/actuation HFAA, Inhale 2 puffs into the lungs every 12 (twelve) hours. Wash out mouth after using, Disp: 1 Inhaler, Rfl: 12    ergocalciferol (ERGOCALCIFEROL) 50,000 unit Cap, Take 1 capsule (50,000 Units total) by mouth every 7 days. Take on Mondays, Disp: 4 capsule, Rfl: 11    gabapentin (NEURONTIN) 300 MG capsule, Take one tab bid and 2 at night., Disp: 120 capsule, Rfl: 11    inhalation device (BREATHERITE VALVED MDI CHAMBER), Use as directed for inhalation., Disp: 1 Device, Rfl: prn    insulin NPH (NOVOLIN N) 100 unit/mL injection, Take 25 units subq with breakfast and 15 units at bedtime, Disp: 4 vial, Rfl: 0    insulin regular (NOVOLIN R) 100 unit/mL Inj injection, Inject 6 units with breakfast and 8 units with dinner, subcutaneously 30 min before meal. (Patient taking differently: Inject 10 units with breakfast and 8 units with dinner, subcutaneously 30 min before meal.), Disp: 10 mL, Rfl: 11    lancets Misc, 1 each by Misc.(Non-Drug; Combo Route) route 3 (three) times daily., Disp: 100 each, Rfl: 11    melatonin 5 mg Tab, Take 15 mg by mouth nightly as needed., Disp: , Rfl:     mycophenolate (CELLCEPT) 250 mg Cap, Take 2 capsules (500 mg total) by mouth 2 (two) times daily. Z94.0 Kidney Transplant 5/21/2016., Disp: 120 capsule, Rfl: 11    ondansetron (ZOFRAN-ODT) 4 MG TbDL, Take 1 tablet (4 mg total) by mouth every 8 (eight) hours as needed., Disp: 21 tablet, Rfl: 1    pen needle, diabetic (EASY COMFORT PEN NEEDLES) 32 gauge x 5/32" Ndle, Inject 1 each into the skin 3 (three) times daily., Disp: 100 each, Rfl: 11    pen needle, diabetic 31 gauge x 1/4" Ndle, 1 each by Misc.(Non-Drug; Combo " "Route) route 4 (four) times daily., Disp: 100 each, Rfl: 0    predniSONE (DELTASONE) 5 MG tablet, Take 1 tablet (5 mg total) by mouth once daily., Disp: 30 tablet, Rfl: 11    sodium bicarbonate 650 MG tablet, Take 4 tablets BID  four hours before or after Harvoni, Disp: 240 tablet, Rfl: 11    tacrolimus (PROGRAF) 1 MG Cap, Take 2mg in the AM and 2mg in the PM by mouth. Z94.0 Kidney Transplant, Disp: 150 capsule, Rfl: 11    ALPRAZolam (XANAX) 1 MG tablet, Take 1 tablet (1 mg total) by mouth 3 (three) times daily as needed for Anxiety., Disp: 30 tablet, Rfl: 0    aspirin (ECOTRIN) 81 MG EC tablet, Take 1 tablet (81 mg total) by mouth once daily., Disp: , Rfl: 0    back brace Misc, Back brace, Disp: 1 each, Rfl: o    hydrocodone-acetaminophen 10-325mg (NORCO)  mg Tab, Take 1 tablet by mouth 2 (two) times daily., Disp: , Rfl:     methadone (DOLOPHINE) 5 MG tablet, Take 5 mg by mouth 3 (three) times daily. , Disp: , Rfl:     methadone (DOLOPHINE) 5 MG tablet, Take 1 tablet (5 mg total) by mouth every 6 (six) hours as needed for Pain., Disp: 30 tablet, Rfl: 0    naproxen (NAPROSYN) 500 MG tablet, , Disp: , Rfl:     zolpidem (AMBIEN) 5 MG Tab, Take 2 tablets (10 mg total) by mouth nightly as needed., Disp: 30 tablet, Rfl: 1  Blood pressure (!) 169/82, pulse 78, height 5' 11" (1.803 m), weight 101.5 kg (223 lb 14 oz).      Neurologic Exam     Mental Status   Oriented to person, place, and time.   Attention: normal. Concentration: normal.   Speech: speech is normal   Level of consciousness: alert  Knowledge: good.     Cranial Nerves     CN II   Visual acuity: normal    CN III, IV, VI   Pupils are equal, round, and reactive to light.  Extraocular motions are normal.     CN V   Facial sensation intact.     CN VII   Facial expression full, symmetric.     CN VIII   Hearing: intact    CN IX, X   Palate: symmetric    CN XI   CN XI normal.     CN XII   CN XII normal.     Motor Exam   Muscle bulk: " normal  Overall muscle tone: normal  Right arm pronator drift: absent  Left arm pronator drift: absent    Strength   Right deltoid: 5/5  Left deltoid: 5/5  Right biceps: 5/5  Left biceps: 5/5  Right triceps: 5/5  Left triceps: 5/5  Right wrist flexion: 5/5  Left wrist flexion: 5/5  Right wrist extension: 5/5  Left wrist extension: 5/5  Right interossei: 5/5  Left interossei: 5/5  Right iliopsoas: 5/5  Left iliopsoas: 5/5  Right quadriceps: 5/5  Left quadriceps: 5/5  Left anterior tibial: 5/5  Left posterior tibial: 5/5  Left peroneal: 4/5  Left gastroc: 5/5    Sensory Exam   Right arm light touch: normal  Left arm light touch: normal  Left leg light touch: decreased from knee    Gait, Coordination, and Reflexes     Gait  Gait: normal    Coordination   Romberg: negative  Finger to nose coordination: normal    Tremor   Resting tremor: absent    Reflexes   Right brachioradialis: 0  Left brachioradialis: 0  Right biceps: 0  Left biceps: 0  Right triceps: 0  Left triceps: 0  Right patellar: 0  Left patellar: 0  Right achilles: 1+  Left achilles: 0  Left plantar: normal  Right Drew: absent  Left Drew: absent  Left ankle clonus: absent      Physical Exam   Constitutional: He is oriented to person, place, and time. He appears well-developed and well-nourished.   HENT:   Head: Normocephalic and atraumatic.   Eyes: EOM are normal. Pupils are equal, round, and reactive to light.   Neck: Normal range of motion. Neck supple.   Cardiovascular: Normal rate and regular rhythm.    Pulmonary/Chest: Effort normal.   SOB     Abdominal: Soft.   Musculoskeletal: Normal range of motion.   Neurological: He is alert and oriented to person, place, and time. He has a normal Finger-Nose-Finger Test and a normal Romberg Test. Gait normal.   Reflex Scores:       Tricep reflexes are 0 on the right side and 0 on the left side.       Bicep reflexes are 0 on the right side and 0 on the left side.       Brachioradialis reflexes are 0 on the  "right side and 0 on the left side.       Patellar reflexes are 0 on the right side and 0 on the left side.       Achilles reflexes are 1+ on the right side and 0 on the left side.  Skin: Skin is warm and dry.   Psychiatric: He has a normal mood and affect. His speech is normal and behavior is normal. Judgment and thought content normal.   Nursing note and vitals reviewed.      Vital Signs  Pulse: 78  BP: (!) 169/82  Pain Score:   5  Pain Loc: Back  Height and Weight  Height: 5' 11" (180.3 cm)  Weight: 101.5 kg (223 lb 14 oz)  BSA (Calculated - sq m): 2.26 sq meters  BMI (Calculated): 31.3  Weight in (lb) to have BMI = 25: 178.9]    Provider dictation:  I reviewed the imaging. He has advanced multilevel spondylotic changes and severe stenosis at L3-L4>L4-L5. This is due to a combination of congeniatally narrow spinal canal and disc herniation with some contribution from prominent epidural fat. He describes bilateral leg pain, numbness and tingling starting in hie lower back and radiating down . This is worse with ambulation. He has a H/O diabetic peripheral neuropathy and right BKA. He has seen Dr Rees in the past who offered him L3-L4 decompression. He has tried PT and ALLI in the past with no improvement.     He has multiple severe medical comorbidities, including diabetes, renal failure S/P renal transplant on immunosuppressants, heart failure, chronic pain. I have explained that outcome of lumbar decompression in patients with severe diabetic neuropathy is not as succesful. Furthermore, surgical intervention would be risky with his medical comorbidities. Nevertheless, he may benefit from minimally invasive decompression. I will order an EMG/NCT to assess his leg pain and numbness and flexion-extension XR of the L spine to rule out dynamic instability. F/u after tests.    Visit Diagnosis:  Lumbar stenosis with neurogenic claudication      "

## 2018-04-30 NOTE — LETTER
April 30, 2018      Charlette Estrada MD  [address] [facility] [address]  [place] [address]           [facility]  [address] [facility] [address] [place] [address]  Phone: [phone]  Fax: [phone]          Patient: Lavelle Ladd   MR Number: [id number]   YOB: 1955   Date of Visit: 4/30/2018       Dear Dr. Charlette Estrada:    Thank you for referring Lavelle Ladd to me for evaluation. Attached you will find relevant portions of my assessment and plan of care.    If you have questions, please do not hesitate to call me. I look forward to following Lavelle Ladd along with you.    Sincerely,    Kary Berrios MD    Enclosure  CC:  No Recipients    If you would like to receive this communication electronically, please contact [email or web address] or [phone] to request more information on [employer] access.    For providers and/or their staff who would like to refer a patient to [facility], please contact us through our one-stop-shop provider referral line, [facility], at [phone].    If you feel you have received this communication in error or would no longer like to receive these types of communications, please e-mail [email or web address]

## 2018-05-07 ENCOUNTER — HOSPITAL ENCOUNTER (OUTPATIENT)
Dept: RADIOLOGY | Facility: HOSPITAL | Age: 63
Discharge: HOME | End: 2018-05-07
Attending: NEUROLOGICAL SURGERY
Payer: MEDICARE

## 2018-05-07 DIAGNOSIS — M48.062 LUMBAR STENOSIS WITH NEUROGENIC CLAUDICATION: ICD-10-CM

## 2018-05-07 PROCEDURE — 72100 X-RAY EXAM L-S SPINE 2/3 VWS: CPT | Mod: TC,FY,PO

## 2018-05-07 PROCEDURE — 72100 X-RAY EXAM L-S SPINE 2/3 VWS: CPT | Mod: 26,,, | Performed by: RADIOLOGY

## 2018-05-07 PROCEDURE — 72120 X-RAY BEND ONLY L-S SPINE: CPT | Mod: 26,,, | Performed by: RADIOLOGY

## 2018-05-11 RX ORDER — TACROLIMUS 0.5 MG/1
1.5 CAPSULE ORAL EVERY 12 HOURS
Qty: 180 CAPSULE | Refills: 11 | Status: SHIPPED | OUTPATIENT
Start: 2018-05-11 | End: 2018-05-25 | Stop reason: SDUPTHER

## 2018-05-22 ENCOUNTER — LAB VISIT (OUTPATIENT)
Dept: LAB | Facility: HOSPITAL | Age: 63
End: 2018-05-22
Attending: INTERNAL MEDICINE
Payer: MEDICARE

## 2018-05-22 DIAGNOSIS — Z94.0 KIDNEY REPLACED BY TRANSPLANT: ICD-10-CM

## 2018-05-22 LAB
ALBUMIN SERPL BCP-MCNC: 3.5 G/DL
ANION GAP SERPL CALC-SCNC: 12 MMOL/L
BUN SERPL-MCNC: 21 MG/DL
CALCIUM SERPL-MCNC: 9.9 MG/DL
CHLORIDE SERPL-SCNC: 105 MMOL/L
CO2 SERPL-SCNC: 22 MMOL/L
CREAT SERPL-MCNC: 1.6 MG/DL
EST. GFR  (AFRICAN AMERICAN): 51.9 ML/MIN/1.73 M^2
EST. GFR  (NON AFRICAN AMERICAN): 44.9 ML/MIN/1.73 M^2
GLUCOSE SERPL-MCNC: 163 MG/DL
PHOSPHATE SERPL-MCNC: 3.1 MG/DL
POTASSIUM SERPL-SCNC: 4.6 MMOL/L
SODIUM SERPL-SCNC: 139 MMOL/L

## 2018-05-22 PROCEDURE — 80069 RENAL FUNCTION PANEL: CPT

## 2018-05-22 PROCEDURE — 36415 COLL VENOUS BLD VENIPUNCTURE: CPT | Mod: PO

## 2018-05-22 PROCEDURE — 80197 ASSAY OF TACROLIMUS: CPT

## 2018-05-23 LAB — TACROLIMUS BLD-MCNC: 4.3 NG/ML

## 2018-05-25 ENCOUNTER — OFFICE VISIT (OUTPATIENT)
Dept: TRANSPLANT | Facility: CLINIC | Age: 63
End: 2018-05-25
Payer: MEDICARE

## 2018-05-25 VITALS
TEMPERATURE: 98 F | HEART RATE: 78 BPM | DIASTOLIC BLOOD PRESSURE: 109 MMHG | HEIGHT: 70 IN | SYSTOLIC BLOOD PRESSURE: 185 MMHG | WEIGHT: 227.06 LBS | OXYGEN SATURATION: 96 % | BODY MASS INDEX: 32.51 KG/M2 | RESPIRATION RATE: 17 BRPM

## 2018-05-25 DIAGNOSIS — M48.062 LUMBAR STENOSIS WITH NEUROGENIC CLAUDICATION: ICD-10-CM

## 2018-05-25 DIAGNOSIS — Z29.89 PROPHYLACTIC IMMUNOTHERAPY: Chronic | ICD-10-CM

## 2018-05-25 DIAGNOSIS — I12.9 RENAL HYPERTENSION: Chronic | ICD-10-CM

## 2018-05-25 DIAGNOSIS — N18.30 CHRONIC KIDNEY DISEASE (CKD), STAGE III (MODERATE): Primary | Chronic | ICD-10-CM

## 2018-05-25 DIAGNOSIS — Z94.0 S/P KIDNEY TRANSPLANT: Chronic | ICD-10-CM

## 2018-05-25 DIAGNOSIS — E87.20 ACIDOSIS: ICD-10-CM

## 2018-05-25 DIAGNOSIS — Z79.60 LONG-TERM USE OF IMMUNOSUPPRESSANT MEDICATION: ICD-10-CM

## 2018-05-25 PROCEDURE — 99999 PR PBB SHADOW E&M-EST. PATIENT-LVL III: CPT | Mod: PBBFAC,,, | Performed by: INTERNAL MEDICINE

## 2018-05-25 PROCEDURE — 3008F BODY MASS INDEX DOCD: CPT | Mod: CPTII,S$GLB,, | Performed by: INTERNAL MEDICINE

## 2018-05-25 PROCEDURE — 99215 OFFICE O/P EST HI 40 MIN: CPT | Mod: S$GLB,,, | Performed by: INTERNAL MEDICINE

## 2018-05-25 RX ORDER — SODIUM BICARBONATE 650 MG/1
2600 TABLET ORAL 2 TIMES DAILY
Qty: 240 TABLET | Refills: 11 | Status: SHIPPED | OUTPATIENT
Start: 2018-05-25 | End: 2019-12-30

## 2018-05-25 RX ORDER — TACROLIMUS 0.5 MG/1
1.5 CAPSULE ORAL EVERY 12 HOURS
Qty: 180 CAPSULE | Refills: 11 | Status: SHIPPED | OUTPATIENT
Start: 2018-05-25 | End: 2019-07-08 | Stop reason: SDUPTHER

## 2018-05-25 RX ORDER — MYCOPHENOLATE MOFETIL 250 MG/1
500 CAPSULE ORAL 2 TIMES DAILY
Qty: 120 CAPSULE | Refills: 11 | Status: SHIPPED | OUTPATIENT
Start: 2018-05-25 | End: 2018-06-01

## 2018-05-25 RX ORDER — PREDNISONE 5 MG/1
5 TABLET ORAL DAILY
Qty: 30 TABLET | Refills: 11 | Status: SHIPPED | OUTPATIENT
Start: 2018-05-25 | End: 2019-01-24 | Stop reason: SDUPTHER

## 2018-05-25 NOTE — LETTER
May 25, 2018        Avel Estrada  [address]  [place] [address]  Phone: [phone]  Fax: [phone]             [facility]  [address]  [facility] [address]  Phone: [phone]   Patient: Lavelle Ladd   MR Number: [id number]   YOB: 1955   Date of Visit: 5/25/2018       Dear Dr. Avel Estrada    Thank you for referring Lavelle Ladd to me for evaluation. Attached you will find relevant portions of my assessment and plan of care.    If you have questions, please do not hesitate to call me. I look forward to following Lavelle Ladd along with you.    Sincerely,    Tiana Blanco MD    Enclosure    If you would like to receive this communication electronically, please contact [email or web address] or [phone] to request [employer] access.    [employer] is a tool which provides read-only access to select patient information with whom you have a relationship. Its easy to use and provides real time access to review your patients record including encounter summaries, notes, results, and demographic information.    If you feel you have received this communication in error or would no longer like to receive these types of communications, please e-mail [email or web address]

## 2018-05-25 NOTE — PROGRESS NOTES
"   Kidney Post-Transplant Assessment    Referring Physician: Avel Estrada  Current Nephrologist: Avel Estrada    ORGAN: RIGHT KIDNEY  Donor Type:  - brain death  PHS Increased Risk: yes  Cold Ischemia: 1088 mins  Induction Medications: thymoglobulin    Subjective:     CC:  Reassessment of renal allograft function and management of chronic immunosuppression.    HPI:  Mr. Ladd is a 62 y.o. year old White male who received a  - brain death kidney transplant on 16.  He has CKD stage 3 - GFR 30-59 and his baseline creatinine is between 1.6-1.8. He takes mycophenolate mofetil, prednisone and tacrolimus for maintenance immunosuppression.     Pertinent History:  ESRD from DM, on hemodialysis started on 10/2013  Organ: SCD, increased PHS risk donor, 54% KDPI  partial dehiscence. 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 Thymo x8 doses (3 half doses, others full dose)  2016 f/u Biopsy - no rejection   +orthostatic hypotension in hospital, d/c'd on midodrine (d/c'd 16)  8/15/16: suspicious for ACR and AMR, no AVR. Rx- SMx3 - Non HLA Ab neg   biopsy with ACR s/p SM pulse and PLEX x3; IVIG in BR   Dec 2016 + SOB and dound to have RUL cavitary lung lesion, found to have kleb pneumo growing from BAL and Enterobacter septicemia now s/p over 4 weeks of IV ertapenem    Lavelle reports URI last week. Denies SOB, cough not productive but feels wet. Dry cough x 1 month, then last weekend started with rhinorrhea. No fever reported. Feels " a lot better than I was last weekend."     He also reports significant back and extr pain (U and L limbs). He has seen neuro and NS, and states he may need operation. Dr. Berrios(neurosurg) noted, "advanced multilevel spondylotic changes " "and severe stenosis at L3-L4>L4-L5. This is due to a combination of congeniatally narrow spinal canal and disc herniation with some contribution from prominent epidural fat." Also sees Dr. LINDSEY Estrada for neurology.    Review of Systems   Constitutional: Negative for fever.   Respiratory: Negative for cough and shortness of breath.    Cardiovascular: Negative for chest pain and leg swelling.   Gastrointestinal: Negative for abdominal pain.   Genitourinary: Negative for decreased urine volume, flank pain and frequency.   Musculoskeletal: Positive for back pain (chronic, off methadone).        Right BKA with prosthesis   Skin: Negative for rash.   Allergic/Immunologic: Positive for immunocompromised state.   Neurological: Negative for light-headedness.   Psychiatric/Behavioral: Negative for confusion.     Objective:     Pulse 78, temperature 97.8 °F (36.6 °C), temperature source Oral, resp. rate 17, height 5' 10" (1.778 m), weight 103 kg (227 lb 1.2 oz), SpO2 96 %.body mass index is 32.58 kg/m².    Physical Exam   Constitutional: He is oriented to person, place, and time. He has a sickly appearance.   HENT:   Head: Normocephalic.   Eyes: Conjunctivae are normal.   Pulmonary/Chest: Effort normal. He has wheezes. He has no rales.   Abdominal: Soft. He exhibits no mass. There is no tenderness.   Musculoskeletal: He exhibits no edema.   Neurological: He is alert and oriented to person, place, and time.   Skin: Skin is dry. No rash noted.     Labs:  Lab Results   Component Value Date    WBC 4.79 05/07/2018    HGB 16.2 05/07/2018    HCT 49.9 05/07/2018     05/22/2018    K 4.6 05/22/2018     05/22/2018    CO2 22 (L) 05/22/2018    BUN 21 05/22/2018    CREATININE 1.6 (H) 05/22/2018    EGFRNONAA 44.9 (A) 05/22/2018    CALCIUM 9.9 05/22/2018    PHOS 3.1 05/22/2018    MG 1.9 05/07/2018    ALBUMIN 3.5 05/22/2018    AST 11 05/07/2018    ALT 16 05/07/2018    UTPCR 0.28 (H) 05/07/2018    .0 (H) 10/30/2017    TACROLIMUS " 4.3 (L) 2018   Labs were reviewed with the patient.    Assessment:     1. Chronic kidney disease (CKD), stage III (moderate)    2.  donor kidney transplant for DM 16    3. Prophylactic immunotherapy    4. Renal hypertension    5. Long-term use of immunosuppressant medication    6. Acidosis    7. Lumbar stenosis with neurogenic claudication        Plan:     Allograft function assessment  Lab Results   Component Value Date    CREATININE 1.6 (H) 2018   CKD3 s/p kidney transplantation at baseline, remains stable. Continue to monitor renal function and electrolytes, HTN, secondary hyperparathyroidism and other issues related to underlying ESRD.3     Continue tacrolimus-based immunosuppression.  Will continue to watch signs, symptoms and levels for side effects and drug toxicity.  IS refilled.    -Monitoring for proteinuria and BK -  Most recent results show:   Lab Results   Component Value Date    BKVIRUSPCRQB <125 2018    BKVIRUSPCRQB <125 2017    UTPCR 0.28 (H) 2018    UTPCR 0.12 2017   -Both BK PCR and urine p/c ratio are acceptable  -Continue monitoring as per program guidelines since BK infection & proteinuria are harbingers of possible allograft dysfunction/failure.     -Resolving URI. Lungs are clear and symptoms improved. Given list of OTC remedies he may try. CXR offered (given past hx PNA), but he declined since feels better than Monday. Advised if s/s not resolving well by end of next week, he should see someone at home given known lung disease..    -Acidosis - Advised of damage acidosis can cause; encourgaed to remain on NaHCO3 2600 mg BID.    Back and neuropathic pain- W/U per neuro and NS. If surgery deemed necessary, I advised him he is on low level IS and medically stable from renal txp perspective to proceed.    He expressed understanding of the plan and agrees with recommendations. At the end of our visit, Lavelle voiced he had no further questions for  me. I reminded him on how to reach us if further questions develop.     Management of kidney disease and related issues should continue by community nephrologist, Dr. ARNULFO Estrada.        Follow-up:   Clinic: return to transplant clinic at 24-, and 36- months post-transplant to reassess for complications from immunosuppression toxicity and monitor for rejection.  Annually thereafter.    Labs: since patient remains at high risk for rejection and drug-related complications that warrant close monitoring, labs will be ordered as follows: continue twice weekly CBC, renal panel, and drug level for first month; then same labs once weekly through 3rd month post-transplant.  Urine for UA and protein/creatinine ratio monthly.  Urine BK - PCR at 1-, 3-, 6-, 9-, 12-, 18-, 24-, and 36- months post-transplant.  Hepatic panel at 1-, 2-, 3-, 6-, 9-, 12-, 18-, 24-, and 36- months post-transplant.    Tiana Blanco MD       Education:   Material provided to the patient.  Patient reminded to call with any health changes since these can be early signs of significant complications.  Also, I advised the patient to be sure any new medications or changes of old medications are discussed with either a pharmacist or physician knowledgeable with transplant to avoid rejection/drug toxicity related to significant drug interactions.    UNOS Patient Status  Functional Status: 60% - Requires occasional assistance but is able to care for needs  Physical Capacity: Limited Mobility sometimes she might see your patient and it would only be

## 2018-05-25 NOTE — PATIENT INSTRUCTIONS
From Dr. Apple:  It is my privilege to participate in your post-transplant care!    For your cold, you may try over the counter remedies after your transplant:  Fever or pain - Tylenol (acetaminophen). We do not recommend NSAIDS such as Motrin, Advil, Aleve, etc.  Cough Suppressant - Dextromethorphan Hydrobromide 30 mg  Nasal congestion - Afrin nasal spray for a few days. Note tablet form decongestants (sudafed, phenylephrine) can raise blood pressure and are not recommended  Allergy symptoms - Antihistamine (benadryl, claritin, zyrtec). These can also help dry up drainage.  For irrigating irritants and thin out mucous - Saline nasal spray    To thin mucous - Guaifenesin (Mucinex),      Please call your health care provider if the upper respiratory symptoms worsen, or you develop new symptoms (such as fever, shortness of breath, etc).    Dont' forget we recommend yearly influenza vaccine. It does not protect you against all infections, but flu kills thousands of patients every year!    Please be sure to let us know if you have any questions or concerns about your health care - we cannot help you if we do not know.  Don't forget we are on call 24/7 for any emergencies.   Best Wishes,  Dr. Ambika Blanco

## 2018-06-01 ENCOUNTER — TELEPHONE (OUTPATIENT)
Dept: TRANSPLANT | Facility: CLINIC | Age: 63
End: 2018-06-01

## 2018-06-01 ENCOUNTER — HOSPITAL ENCOUNTER (EMERGENCY)
Facility: HOSPITAL | Age: 63
Discharge: HOME | End: 2018-06-01
Attending: EMERGENCY MEDICINE
Payer: MEDICARE

## 2018-06-01 VITALS
OXYGEN SATURATION: 95 % | BODY MASS INDEX: 31.07 KG/M2 | WEIGHT: 217 LBS | RESPIRATION RATE: 20 BRPM | TEMPERATURE: 98 F | HEART RATE: 79 BPM | HEIGHT: 70 IN | SYSTOLIC BLOOD PRESSURE: 175 MMHG | DIASTOLIC BLOOD PRESSURE: 100 MMHG

## 2018-06-01 DIAGNOSIS — M48.062 LUMBAR STENOSIS WITH NEUROGENIC CLAUDICATION: Primary | ICD-10-CM

## 2018-06-01 DIAGNOSIS — M79.673 FOOT PAIN: ICD-10-CM

## 2018-06-01 DIAGNOSIS — L03.119 CELLULITIS OF FOOT: Primary | ICD-10-CM

## 2018-06-01 DIAGNOSIS — Z94.0 KIDNEY REPLACED BY TRANSPLANT: Primary | ICD-10-CM

## 2018-06-01 DIAGNOSIS — L08.9 INFECTED ABRASION OF TOE OF RIGHT FOOT, INITIAL ENCOUNTER: Primary | ICD-10-CM

## 2018-06-01 DIAGNOSIS — E08.621 DIABETIC ULCER OF TOE ASSOCIATED WITH DIABETES MELLITUS DUE TO UNDERLYING CONDITION, LIMITED TO BREAKDOWN OF SKIN, UNSPECIFIED LATERALITY: ICD-10-CM

## 2018-06-01 DIAGNOSIS — S90.414A INFECTED ABRASION OF TOE OF RIGHT FOOT, INITIAL ENCOUNTER: Primary | ICD-10-CM

## 2018-06-01 DIAGNOSIS — L97.501 DIABETIC ULCER OF TOE ASSOCIATED WITH DIABETES MELLITUS DUE TO UNDERLYING CONDITION, LIMITED TO BREAKDOWN OF SKIN, UNSPECIFIED LATERALITY: ICD-10-CM

## 2018-06-01 PROCEDURE — 25000003 PHARM REV CODE 250: Performed by: NURSE PRACTITIONER

## 2018-06-01 PROCEDURE — 99283 EMERGENCY DEPT VISIT LOW MDM: CPT | Mod: 25

## 2018-06-01 PROCEDURE — S0077 INJECTION, CLINDAMYCIN PHOSP: HCPCS | Performed by: NURSE PRACTITIONER

## 2018-06-01 PROCEDURE — 96372 THER/PROPH/DIAG INJ SC/IM: CPT

## 2018-06-01 RX ORDER — CLINDAMYCIN HYDROCHLORIDE 300 MG/1
300 CAPSULE ORAL 4 TIMES DAILY
Qty: 28 CAPSULE | Refills: 0 | Status: SHIPPED | OUTPATIENT
Start: 2018-06-01 | End: 2018-06-08

## 2018-06-01 RX ORDER — CLINDAMYCIN PHOSPHATE 150 MG/ML
450 INJECTION, SOLUTION INTRAVENOUS
Status: COMPLETED | OUTPATIENT
Start: 2018-06-01 | End: 2018-06-01

## 2018-06-01 RX ADMIN — BACITRACIN, NEOMYCIN, POLYMYXIN B 1 EACH: 400; 3.5; 5 OINTMENT TOPICAL at 12:06

## 2018-06-01 RX ADMIN — CLINDAMYCIN PHOSPHATE 450 MG: 150 INJECTION, SOLUTION INTRAMUSCULAR; INTRAVENOUS at 12:06

## 2018-06-01 NOTE — TELEPHONE ENCOUNTER
Call placed to patient. Patient reports that he is not feeling well and has started ABX. Will go to ED if symptoms worsen. Will repeat labs on Monday. Consult for ID and Podiatry entered. Cellcept placed on HOLD.

## 2018-06-01 NOTE — ED PROVIDER NOTES
Encounter Date: 2018       History     Chief Complaint   Patient presents with    Wound Check     wound check on his big toe . Hx DM      62 year old male with complaint of wound to left great toe X one week.  Pt reports that he clipped his toe nails over one week ago and injured his left great toe.  Denies pain.  Pt sent to ER for evaluation.  No fever or chills.  No other complaints.           Review of patient's allergies indicates:  No Known Allergies  Past Medical History:   Diagnosis Date    DINORAH (acute kidney injury) 2016    Arthritis     CHF (congestive heart failure)     Chronic obstructive pulmonary disease 2016    Coronary artery disease involving native coronary artery of native heart without angina pectoris 2016     donor kidney transplant for DM 16     Induction with Thymo x3 and IV solumedrol to total 875mg  Kidney Biopsy  2016: 16 glomeruli, ACR type 1 AVR type 2, significant microcirculatory changes, c4d negative, No DSA, 5 to10% fibrosis. Treated with thymo x8 2016- no rejection      Diastolic heart failure     DM2 (diabetes mellitus, type 2)     Encounter for blood transfusion     ESRD on RRT since 10/2013 10/29/2013    Biopsy proven diabetic nephropathy and lymphoplasmacytic interstitial infiltrate not c/w with AIN (ddx sjogrens or assoc with tamm-horsefall protein extravasation)     GERD (gastroesophageal reflux disease)     History of hepatitis C, s/p successful Rx w/ SVR12 - 2017    Completed 12 weeks harvoni w/ SVR    Hyperlipidemia     Hypertension     Prophylactic immunotherapy     Proteinuria     PVD (peripheral vascular disease) 2017    RLE BKA CT 16 Extensive atherosclerotic disease of the aorta and mesenteric arteries.     Reactive airway disease     Renal hypertension     Type 2 diabetes mellitus with diabetic neuropathy, with long-term current use of insulin 2016    Type 2 diabetes mellitus with  hyperglycemia, with long-term current use of insulin     Type 2 diabetes mellitus with retinopathy, with long-term current use of insulin 12/1/2016    Vitamin B12 deficiency      Past Surgical History:   Procedure Laterality Date    av bovine graft      Left UE    AV FISTULA PLACEMENT      left UE    CARDIAC CATHETERIZATION  02/2015    KIDNEY TRANSPLANT  05/21/2016    LEG AMPUTATION THROUGH KNEE  2011    right LE, started as nail puncture leading to diabetic ulcer     Family History   Problem Relation Age of Onset    Cancer Father     Diabetes Father     Heart failure Father     Stroke Father     Heart failure Mother     Kidney disease Neg Hx      Social History   Substance Use Topics    Smoking status: Former Smoker     Packs/day: 1.00     Years: 40.00     Quit date: 1/11/2013    Smokeless tobacco: Never Used    Alcohol use 3.6 oz/week     6 Cans of beer per week      Comment: 6 beers/week     Review of Systems   Constitutional: Negative for fever.   HENT: Negative for sore throat.    Respiratory: Negative for shortness of breath.    Cardiovascular: Negative for chest pain.   Gastrointestinal: Negative for nausea.   Genitourinary: Negative for dysuria.   Musculoskeletal: Negative for back pain.        Toe wound   Skin: Negative for rash.   Neurological: Negative for weakness.   Hematological: Does not bruise/bleed easily.       Physical Exam     Initial Vitals [06/01/18 1138]   BP Pulse Resp Temp SpO2   (!) 175/100 79 20 98.2 °F (36.8 °C) 95 %      MAP       125         Physical Exam    Nursing note and vitals reviewed.  Constitutional: He appears well-developed and well-nourished.   HENT:   Head: Normocephalic and atraumatic.   Eyes: Conjunctivae are normal. Pupils are equal, round, and reactive to light.   Neck: Normal range of motion. Neck supple.   Cardiovascular: Normal rate, regular rhythm, normal heart sounds and intact distal pulses.   Pulmonary/Chest: Breath sounds normal.   Abdominal:  Soft. There is no rebound and no guarding.   Musculoskeletal: Normal range of motion.   Weak left dorsalis pedis pulse, erythema left great toe with 3 mm epidermal ulceration mid dorsal toe, 3 mm area of ecchymosis distal medial great toe, no pus drainage   Neurological: He is alert.   Skin: Skin is warm and dry.   Psychiatric: He has a normal mood and affect. His behavior is normal. Thought content normal.             ED Course   Procedures  Labs Reviewed - No data to display        12:00 PM  Ecchymotic tissue debrided and removed        Medical Decision Makin:17 PM  Incidental fracture to 2nd toe, age of injury undetermined, will have pt follow up with PCP for recheck                      Clinical Impression:   The primary encounter diagnosis was Cellulitis of foot. Diagnoses of Foot pain and Diabetic ulcer of toe associated with diabetes mellitus due to underlying condition, limited to breakdown of skin, unspecified laterality were also pertinent to this visit.    X-Ray Foot Complete Left   Final Result      Minimally displaced intra-articular fracture of the 2nd proximal phalanx base.  Diffuse arterial calcifications.         Electronically signed by: Chapin Dorman MD   Date:    2018   Time:    12:05                                 Gustavo Newton NP  18 [id number]

## 2018-06-01 NOTE — ED NOTES
Triple antibiotic ointment applied to wound on toe. Non stick dressing applied. Wrapped with ace wrap

## 2018-06-01 NOTE — TELEPHONE ENCOUNTER
----- Message from Radha Pereyra MD sent at 6/1/2018  3:22 PM CDT -----  Regarding: RE: Immuno monitoring  Hey,    Hello,      From ER note,  He has active infection/wound. No blood test in ER from yesterday!!  He needs to be off MMF (is still on the med list).I can see that he is clindamycin.  Is he scheduled to follow with podiatrist/or transplant ID? If no, he should do ASAP.  Please request lab on Monday (CBC, renal panel, cylex, prograf level) and hold MMF today. Based on lab, we plan accordingly.  Please educate him to come back to ER if he develops fever, chills, pussy drain or foot swelling.    Thank you   Radha        ----- Message -----  From: Rosita Newton RN  Sent: 6/1/2018   2:20 PM  To: Tiana Blanco MD, #  Subject: Immuno monitoring                                Patient seen in ED for gangrenous infected toe. Should we change his immunosuppression? Hx of rejection and previous amputation. Should we follow labs closer than protocol? Patient was recently seen at 24 months, would not be due for any tx f/u until 2019.   Thanks,  Rosita

## 2018-06-04 ENCOUNTER — TELEPHONE (OUTPATIENT)
Dept: TRANSPLANT | Facility: HOSPITAL | Age: 63
End: 2018-06-04

## 2018-06-04 ENCOUNTER — TELEPHONE (OUTPATIENT)
Dept: TRANSPLANT | Facility: CLINIC | Age: 63
End: 2018-06-04

## 2018-06-04 ENCOUNTER — TELEPHONE (OUTPATIENT)
Dept: NEPHROLOGY | Facility: CLINIC | Age: 63
End: 2018-06-04

## 2018-06-04 NOTE — TELEPHONE ENCOUNTER
----- Message from Avel Estrada MD sent at 6/1/2018  1:12 PM CDT -----  I saw his X-ray show fracture. I didnt order it. Please check on it and let him see his orthopedic. He has seen our orthopedic I think dr. Yousif or irvin. Let them see him clinic ASAP.  Midelvel is fine too

## 2018-06-04 NOTE — TELEPHONE ENCOUNTER
Call placed to patient. Will monitor for healing of foot wound and consider lowering Is if delayed healing results. Patient states understanding.

## 2018-06-04 NOTE — TELEPHONE ENCOUNTER
----- Message from Tiana Blanco MD sent at 6/4/2018  2:48 PM CDT -----  Regarding: RE: Immuno monitoring  His IS has already been minimized. Would advise him to let us know if wound not healing, and that may warrant further lowering IS.  For now, let his local MDs manage and follow him.  Thanks for asking!    ----- Message -----  From: Rosita Newton RN  Sent: 6/1/2018   2:20 PM  To: Tiana Blanco MD, #  Subject: Immuno monitoring                                Patient seen in ED for gangrenous infected toe. Should we change his immunosuppression? Hx of rejection and previous amputation. Should we follow labs closer than protocol? Patient was recently seen at 24 months, would not be due for any tx f/u until 2019.   Thanks,  Rosita

## 2018-06-04 NOTE — TELEPHONE ENCOUNTER
----- Message from Rosita Newton RN sent at 6/1/2018  2:20 PM CDT -----  Regarding: Immuno monitoring  Patient seen in ED for gangrenous infected toe. Should we change his immunosuppression? Hx of rejection and previous amputation. Should we follow labs closer than protocol? Patient was recently seen at 24 months, would not be due for any tx f/u until 2019.   Thanks,  Rosita

## 2018-06-04 NOTE — TELEPHONE ENCOUNTER
Called patient to schedule with Orthopedic per . No answer, left message for patient to return the call.

## 2018-06-05 ENCOUNTER — LAB VISIT (OUTPATIENT)
Dept: LAB | Facility: HOSPITAL | Age: 63
End: 2018-06-05
Attending: FAMILY MEDICINE
Payer: MEDICARE

## 2018-06-05 ENCOUNTER — TELEPHONE (OUTPATIENT)
Dept: NEUROLOGY | Facility: CLINIC | Age: 63
End: 2018-06-05

## 2018-06-05 DIAGNOSIS — Z94.0 KIDNEY REPLACED BY TRANSPLANT: ICD-10-CM

## 2018-06-05 LAB
ALBUMIN SERPL BCP-MCNC: 3.3 G/DL
ANION GAP SERPL CALC-SCNC: 10 MMOL/L
BASOPHILS # BLD AUTO: 0.03 K/UL
BASOPHILS NFR BLD: 0.6 %
BUN SERPL-MCNC: 24 MG/DL
CALCIUM SERPL-MCNC: 9.6 MG/DL
CHLORIDE SERPL-SCNC: 105 MMOL/L
CO2 SERPL-SCNC: 24 MMOL/L
CREAT SERPL-MCNC: 1.7 MG/DL
DIFFERENTIAL METHOD: ABNORMAL
EOSINOPHIL # BLD AUTO: 0.1 K/UL
EOSINOPHIL NFR BLD: 1.4 %
ERYTHROCYTE [DISTWIDTH] IN BLOOD BY AUTOMATED COUNT: 12.9 %
EST. GFR  (AFRICAN AMERICAN): 48.2 ML/MIN/1.73 M^2
EST. GFR  (NON AFRICAN AMERICAN): 41.7 ML/MIN/1.73 M^2
GLUCOSE SERPL-MCNC: 138 MG/DL
HCT VFR BLD AUTO: 50.1 %
HGB BLD-MCNC: 16 G/DL
IMM GRANULOCYTES # BLD AUTO: 0.02 K/UL
IMM GRANULOCYTES NFR BLD AUTO: 0.4 %
LYMPHOCYTES # BLD AUTO: 1.5 K/UL
LYMPHOCYTES NFR BLD: 31.7 %
MCH RBC QN AUTO: 30.7 PG
MCHC RBC AUTO-ENTMCNC: 31.9 G/DL
MCV RBC AUTO: 96 FL
MONOCYTES # BLD AUTO: 0.6 K/UL
MONOCYTES NFR BLD: 12.4 %
NEUTROPHILS # BLD AUTO: 2.6 K/UL
NEUTROPHILS NFR BLD: 53.5 %
NRBC BLD-RTO: 0 /100 WBC
PHOSPHATE SERPL-MCNC: 3.1 MG/DL
PLATELET # BLD AUTO: 222 K/UL
PMV BLD AUTO: 10.2 FL
POTASSIUM SERPL-SCNC: 4.6 MMOL/L
RBC # BLD AUTO: 5.21 M/UL
SODIUM SERPL-SCNC: 139 MMOL/L
WBC # BLD AUTO: 4.83 K/UL

## 2018-06-05 PROCEDURE — 36415 COLL VENOUS BLD VENIPUNCTURE: CPT | Mod: PO

## 2018-06-05 PROCEDURE — 80197 ASSAY OF TACROLIMUS: CPT

## 2018-06-05 PROCEDURE — 85025 COMPLETE CBC W/AUTO DIFF WBC: CPT

## 2018-06-05 PROCEDURE — 80069 RENAL FUNCTION PANEL: CPT

## 2018-06-05 PROCEDURE — 86352 CELL FUNCTION ASSAY W/STIM: CPT

## 2018-06-05 NOTE — TELEPHONE ENCOUNTER
----- Message from Noah Fierro sent at 6/5/2018  7:01 AM CDT -----  Contact: pt  He's calling in regards to a missed call, [phone] (home)

## 2018-06-05 NOTE — TELEPHONE ENCOUNTER
----- Message from Noah Fierro sent at 6/5/2018  7:01 AM CDT -----  Contact: pt  He's calling in regards to a missed call, [phone] (home)

## 2018-06-06 LAB — TACROLIMUS BLD-MCNC: 8.8 NG/ML

## 2018-06-06 NOTE — PROGRESS NOTES
Results reviewed, and action is needed: tacro remains labile, but prefer to error on side of it being a bit too high than low. No change.

## 2018-06-07 ENCOUNTER — TELEPHONE (OUTPATIENT)
Dept: TRANSPLANT | Facility: CLINIC | Age: 63
End: 2018-06-07

## 2018-06-07 DIAGNOSIS — E11.65 TYPE 2 DIABETES MELLITUS WITH HYPERGLYCEMIA, WITH LONG-TERM CURRENT USE OF INSULIN: ICD-10-CM

## 2018-06-07 DIAGNOSIS — Z79.4 TYPE 2 DIABETES MELLITUS WITH HYPERGLYCEMIA, WITH LONG-TERM CURRENT USE OF INSULIN: ICD-10-CM

## 2018-06-07 LAB — IMMUNKNOW (STIMULATED): 245 NG/ML

## 2018-06-07 RX ORDER — HUMAN INSULIN 100 [IU]/ML
INJECTION, SOLUTION SUBCUTANEOUS
Qty: 10 ML | Refills: 1 | Status: ON HOLD | OUTPATIENT
Start: 2018-06-07 | End: 2018-11-13 | Stop reason: HOSPADM

## 2018-06-08 ENCOUNTER — TELEPHONE (OUTPATIENT)
Dept: TRANSPLANT | Facility: CLINIC | Age: 63
End: 2018-06-08

## 2018-06-08 NOTE — TELEPHONE ENCOUNTER
----- Message from Tiana Blanco MD sent at 6/7/2018  5:50 PM CDT -----  Regarding: RE: Immuno monitoring  Sure! No apology needed.  Issue was addressed but message left in my in basket, so it looked like not handled;-)    ----- Message -----  From: Rosita Newton RN  Sent: 6/7/2018   5:22 PM  To: Tiana Blanco MD  Subject: RE: Immuno monitoring                            Sorry, I realized that I already stopped his Cellcept on 6/1/18 as advised by Dr. Pereyra. Would it be ok to repeat labs w/ cylex in 1 month and re-evaluate? He will be scheduled to see ID and Podiatry also.  Rosita  ----- Message -----  From: Tiana Blanco MD  Sent: 6/4/2018   2:48 PM  To: Rosita Newton RN  Subject: RE: Immuno monitoring                            His IS has already been minimized. Would advise him to let us know if wound not healing, and that may warrant further lowering IS.  For now, let his local MDs manage and follow him.  Thanks for asking!    ----- Message -----  From: Rosita Newton RN  Sent: 6/1/2018   2:20 PM  To: Tiana Blanco MD, #  Subject: Immuno monitoring                                Patient seen in ED for gangrenous infected toe. Should we change his immunosuppression? Hx of rejection and previous amputation. Should we follow labs closer than protocol? Patient was recently seen at 24 months, would not be due for any tx f/u until 2019.   Thanks,  Rosita

## 2018-06-11 RX ORDER — PEN NEEDLE, DIABETIC 29 G X1/2"
NEEDLE, DISPOSABLE MISCELLANEOUS
Qty: 120 EACH | Refills: 10 | Status: SHIPPED | OUTPATIENT
Start: 2018-06-11 | End: 2022-01-01

## 2018-06-12 ENCOUNTER — HOSPITAL ENCOUNTER (INPATIENT)
Facility: HOSPITAL | Age: 63
LOS: 3 days | Discharge: HOME | DRG: 271 | End: 2018-06-15
Attending: EMERGENCY MEDICINE | Admitting: INTERNAL MEDICINE
Payer: MEDICARE

## 2018-06-12 DIAGNOSIS — M79.606 PAIN OF LOWER EXTREMITY: ICD-10-CM

## 2018-06-12 DIAGNOSIS — S91.102D OPEN WOUND OF LEFT GREAT TOE, SUBSEQUENT ENCOUNTER: ICD-10-CM

## 2018-06-12 DIAGNOSIS — Z79.4 TYPE 2 DIABETES MELLITUS WITH OTHER SKIN COMPLICATION, WITH LONG-TERM CURRENT USE OF INSULIN: ICD-10-CM

## 2018-06-12 DIAGNOSIS — I73.9 PVD (PERIPHERAL VASCULAR DISEASE): Primary | Chronic | ICD-10-CM

## 2018-06-12 DIAGNOSIS — R03.0 ELEVATED BLOOD PRESSURE READING: ICD-10-CM

## 2018-06-12 DIAGNOSIS — Z94.0 RENAL TRANSPLANT, STATUS POST: ICD-10-CM

## 2018-06-12 DIAGNOSIS — E11.628 TYPE 2 DIABETES MELLITUS WITH OTHER SKIN COMPLICATION, WITH LONG-TERM CURRENT USE OF INSULIN: ICD-10-CM

## 2018-06-12 DIAGNOSIS — D84.9 IMMUNOCOMPROMISED STATE: ICD-10-CM

## 2018-06-12 DIAGNOSIS — S91.102A OPEN WOUND OF LEFT GREAT TOE: ICD-10-CM

## 2018-06-12 LAB
ALBUMIN SERPL BCP-MCNC: 3.5 G/DL
ALP SERPL-CCNC: 73 U/L
ALT SERPL W/O P-5'-P-CCNC: 10 U/L
ANION GAP SERPL CALC-SCNC: 11 MMOL/L
AST SERPL-CCNC: 17 U/L
BASOPHILS # BLD AUTO: 0.01 K/UL
BASOPHILS NFR BLD: 0.2 %
BILIRUB SERPL-MCNC: 0.4 MG/DL
BUN SERPL-MCNC: 23 MG/DL
CALCIUM SERPL-MCNC: 9.4 MG/DL
CHLORIDE SERPL-SCNC: 102 MMOL/L
CO2 SERPL-SCNC: 23 MMOL/L
CREAT SERPL-MCNC: 1.7 MG/DL
CRP SERPL-MCNC: 2.5 MG/L
DIFFERENTIAL METHOD: ABNORMAL
EOSINOPHIL # BLD AUTO: 0.1 K/UL
EOSINOPHIL NFR BLD: 1 %
ERYTHROCYTE [DISTWIDTH] IN BLOOD BY AUTOMATED COUNT: 13.4 %
ERYTHROCYTE [SEDIMENTATION RATE] IN BLOOD BY WESTERGREN METHOD: 11 MM/HR
EST. GFR  (AFRICAN AMERICAN): 48 ML/MIN/1.73 M^2
EST. GFR  (NON AFRICAN AMERICAN): 42 ML/MIN/1.73 M^2
ESTIMATED AVG GLUCOSE: 237 MG/DL
GLUCOSE SERPL-MCNC: 233 MG/DL (ref 70–110)
GLUCOSE SERPL-MCNC: 243 MG/DL
HBA1C MFR BLD HPLC: 9.9 %
HCT VFR BLD AUTO: 46 %
HGB BLD-MCNC: 15.6 G/DL
LYMPHOCYTES # BLD AUTO: 1.4 K/UL
LYMPHOCYTES NFR BLD: 26.7 %
MCH RBC QN AUTO: 31.5 PG
MCHC RBC AUTO-ENTMCNC: 33.9 G/DL
MCV RBC AUTO: 93 FL
MONOCYTES # BLD AUTO: 0.6 K/UL
MONOCYTES NFR BLD: 11 %
NEUTROPHILS # BLD AUTO: 3.1 K/UL
NEUTROPHILS NFR BLD: 61.1 %
PLATELET # BLD AUTO: 209 K/UL
PMV BLD AUTO: 10.1 FL
POCT GLUCOSE: 230 MG/DL (ref 70–110)
POCT GLUCOSE: 233 MG/DL (ref 70–110)
POCT GLUCOSE: 268 MG/DL (ref 70–110)
POTASSIUM SERPL-SCNC: 4.4 MMOL/L
PROT SERPL-MCNC: 7 G/DL
RBC # BLD AUTO: 4.95 M/UL
SODIUM SERPL-SCNC: 136 MMOL/L
WBC # BLD AUTO: 5.09 K/UL

## 2018-06-12 PROCEDURE — 87040 BLOOD CULTURE FOR BACTERIA: CPT

## 2018-06-12 PROCEDURE — 85651 RBC SED RATE NONAUTOMATED: CPT

## 2018-06-12 PROCEDURE — 25000003 PHARM REV CODE 250: Performed by: PHYSICIAN ASSISTANT

## 2018-06-12 PROCEDURE — 25000003 PHARM REV CODE 250: Performed by: EMERGENCY MEDICINE

## 2018-06-12 PROCEDURE — 99223 1ST HOSP IP/OBS HIGH 75: CPT | Mod: ,,, | Performed by: INTERNAL MEDICINE

## 2018-06-12 PROCEDURE — 63600175 PHARM REV CODE 636 W HCPCS: Performed by: EMERGENCY MEDICINE

## 2018-06-12 PROCEDURE — 21400001 HC TELEMETRY ROOM

## 2018-06-12 PROCEDURE — 85025 COMPLETE CBC W/AUTO DIFF WBC: CPT

## 2018-06-12 PROCEDURE — A9585 GADOBUTROL INJECTION: HCPCS | Performed by: INTERNAL MEDICINE

## 2018-06-12 PROCEDURE — 80053 COMPREHEN METABOLIC PANEL: CPT

## 2018-06-12 PROCEDURE — 99285 EMERGENCY DEPT VISIT HI MDM: CPT | Mod: 25

## 2018-06-12 PROCEDURE — 82962 GLUCOSE BLOOD TEST: CPT

## 2018-06-12 PROCEDURE — 25500020 PHARM REV CODE 255: Performed by: INTERNAL MEDICINE

## 2018-06-12 PROCEDURE — 83036 HEMOGLOBIN GLYCOSYLATED A1C: CPT

## 2018-06-12 PROCEDURE — 36415 COLL VENOUS BLD VENIPUNCTURE: CPT

## 2018-06-12 PROCEDURE — 63600175 PHARM REV CODE 636 W HCPCS: Performed by: PHYSICIAN ASSISTANT

## 2018-06-12 PROCEDURE — 86140 C-REACTIVE PROTEIN: CPT

## 2018-06-12 PROCEDURE — 94640 AIRWAY INHALATION TREATMENT: CPT

## 2018-06-12 PROCEDURE — 87070 CULTURE OTHR SPECIMN AEROBIC: CPT

## 2018-06-12 PROCEDURE — 96374 THER/PROPH/DIAG INJ IV PUSH: CPT

## 2018-06-12 PROCEDURE — 25000242 PHARM REV CODE 250 ALT 637 W/ HCPCS: Performed by: PHYSICIAN ASSISTANT

## 2018-06-12 RX ORDER — BUDESONIDE 0.5 MG/2ML
0.5 INHALANT ORAL EVERY 12 HOURS
Status: DISCONTINUED | OUTPATIENT
Start: 2018-06-12 | End: 2018-06-15 | Stop reason: HOSPADM

## 2018-06-12 RX ORDER — ONDANSETRON 8 MG/1
8 TABLET, ORALLY DISINTEGRATING ORAL EVERY 8 HOURS PRN
Status: DISCONTINUED | OUTPATIENT
Start: 2018-06-12 | End: 2018-06-15 | Stop reason: HOSPADM

## 2018-06-12 RX ORDER — PREDNISONE 5 MG/1
5 TABLET ORAL DAILY
Status: DISCONTINUED | OUTPATIENT
Start: 2018-06-12 | End: 2018-06-15 | Stop reason: HOSPADM

## 2018-06-12 RX ORDER — IPRATROPIUM BROMIDE AND ALBUTEROL SULFATE 2.5; .5 MG/3ML; MG/3ML
3 SOLUTION RESPIRATORY (INHALATION) EVERY 6 HOURS PRN
Status: DISCONTINUED | OUTPATIENT
Start: 2018-06-12 | End: 2018-06-15 | Stop reason: HOSPADM

## 2018-06-12 RX ORDER — GADOBUTROL 604.72 MG/ML
9 INJECTION INTRAVENOUS
Status: COMPLETED | OUTPATIENT
Start: 2018-06-12 | End: 2018-06-12

## 2018-06-12 RX ORDER — IBUPROFEN 200 MG
24 TABLET ORAL
Status: DISCONTINUED | OUTPATIENT
Start: 2018-06-12 | End: 2018-06-15 | Stop reason: HOSPADM

## 2018-06-12 RX ORDER — GLUCAGON 1 MG
1 KIT INJECTION
Status: DISCONTINUED | OUTPATIENT
Start: 2018-06-12 | End: 2018-06-15 | Stop reason: HOSPADM

## 2018-06-12 RX ORDER — ASPIRIN 81 MG/1
81 TABLET ORAL DAILY
Status: DISCONTINUED | OUTPATIENT
Start: 2018-06-13 | End: 2018-06-15 | Stop reason: HOSPADM

## 2018-06-12 RX ORDER — ZOLPIDEM TARTRATE 5 MG/1
5 TABLET ORAL NIGHTLY PRN
Status: DISCONTINUED | OUTPATIENT
Start: 2018-06-12 | End: 2018-06-15 | Stop reason: HOSPADM

## 2018-06-12 RX ORDER — IBUPROFEN 200 MG
16 TABLET ORAL
Status: DISCONTINUED | OUTPATIENT
Start: 2018-06-12 | End: 2018-06-15 | Stop reason: HOSPADM

## 2018-06-12 RX ORDER — ACETAMINOPHEN 325 MG/1
650 TABLET ORAL EVERY 6 HOURS PRN
Status: DISCONTINUED | OUTPATIENT
Start: 2018-06-12 | End: 2018-06-15 | Stop reason: HOSPADM

## 2018-06-12 RX ORDER — INSULIN ASPART 100 [IU]/ML
0-5 INJECTION, SOLUTION INTRAVENOUS; SUBCUTANEOUS
Status: DISCONTINUED | OUTPATIENT
Start: 2018-06-12 | End: 2018-06-15 | Stop reason: HOSPADM

## 2018-06-12 RX ORDER — ARFORMOTEROL TARTRATE 15 UG/2ML
15 SOLUTION RESPIRATORY (INHALATION) EVERY 12 HOURS
Status: DISCONTINUED | OUTPATIENT
Start: 2018-06-12 | End: 2018-06-15 | Stop reason: HOSPADM

## 2018-06-12 RX ORDER — GABAPENTIN 100 MG/1
100 CAPSULE ORAL 3 TIMES DAILY
Status: DISCONTINUED | OUTPATIENT
Start: 2018-06-12 | End: 2018-06-15 | Stop reason: HOSPADM

## 2018-06-12 RX ORDER — HYDRALAZINE HYDROCHLORIDE 20 MG/ML
10 INJECTION INTRAMUSCULAR; INTRAVENOUS EVERY 6 HOURS PRN
Status: DISCONTINUED | OUTPATIENT
Start: 2018-06-12 | End: 2018-06-15 | Stop reason: HOSPADM

## 2018-06-12 RX ORDER — HYDROCODONE BITARTRATE AND ACETAMINOPHEN 10; 325 MG/1; MG/1
1 TABLET ORAL EVERY 6 HOURS PRN
Status: DISCONTINUED | OUTPATIENT
Start: 2018-06-12 | End: 2018-06-13

## 2018-06-12 RX ORDER — SODIUM BICARBONATE 650 MG/1
2600 TABLET ORAL 2 TIMES DAILY
Status: DISCONTINUED | OUTPATIENT
Start: 2018-06-12 | End: 2018-06-15 | Stop reason: HOSPADM

## 2018-06-12 RX ADMIN — INSULIN DETEMIR 5 UNITS: 100 INJECTION, SOLUTION SUBCUTANEOUS at 09:06

## 2018-06-12 RX ADMIN — INSULIN ASPART 3 UNITS: 100 INJECTION, SOLUTION INTRAVENOUS; SUBCUTANEOUS at 04:06

## 2018-06-12 RX ADMIN — BUDESONIDE 0.5 MG: 0.5 SUSPENSION RESPIRATORY (INHALATION) at 08:06

## 2018-06-12 RX ADMIN — SODIUM BICARBONATE TAB 650 MG 2600 MG: 650 TAB at 09:06

## 2018-06-12 RX ADMIN — GABAPENTIN 100 MG: 100 CAPSULE ORAL at 09:06

## 2018-06-12 RX ADMIN — HYDROCODONE BITARTRATE AND ACETAMINOPHEN 1 TABLET: 10; 325 TABLET ORAL at 12:06

## 2018-06-12 RX ADMIN — PIPERACILLIN AND TAZOBACTAM 4.5 G: 4; .5 INJECTION, POWDER, LYOPHILIZED, FOR SOLUTION INTRAVENOUS; PARENTERAL at 06:06

## 2018-06-12 RX ADMIN — HYDROCODONE BITARTRATE AND ACETAMINOPHEN 1 TABLET: 10; 325 TABLET ORAL at 06:06

## 2018-06-12 RX ADMIN — ARFORMOTEROL TARTRATE 15 MCG: 15 SOLUTION RESPIRATORY (INHALATION) at 08:06

## 2018-06-12 RX ADMIN — VANCOMYCIN HYDROCHLORIDE 1250 MG: 1 INJECTION, POWDER, LYOPHILIZED, FOR SOLUTION INTRAVENOUS at 09:06

## 2018-06-12 RX ADMIN — GADOBUTROL 9 ML: 604.72 INJECTION INTRAVENOUS at 08:06

## 2018-06-12 RX ADMIN — PIPERACILLIN AND TAZOBACTAM 4.5 G: 4; .5 INJECTION, POWDER, LYOPHILIZED, FOR SOLUTION INTRAVENOUS; PARENTERAL at 11:06

## 2018-06-12 NOTE — ASSESSMENT & PLAN NOTE
1. S/p DDRT in 2016 : stable allograft fn , cont Prograf 1.5 mg bid and Pred 5 mg daily ,    2. HTN : resume home BP meds, follow    3. Left toe gangrene, awaiting Podiatry eval , started on broad spectrum abx     4. Metabolic acidosis : resume Bicarb supplements as before     5. Anemia of CKD : stable Hb

## 2018-06-12 NOTE — CONSULTS
Pharmacy Vancomycin Consult Note    WBC=5.09  Tmax=98.4  CrCl=52.5ml/min  Scr=1.7    Cultures: pending  Dx. Skin/soft tissue  Goal trough=10-15mcg/ml    Current dose: Vancomycin 1250mg Q18  Trough due 6/13 @2030    Thank you for allowing us to participate in this patient's care.  Amy Rome, Pharm D 6/12/2018 11:59 AM

## 2018-06-12 NOTE — CONSULTS
[facility]  Nephrology  Consult Note      Patient Name: Lavelle Ladd  MRN: [id number]  Admission Date: 2018  Hospital Length of Stay: 0 days  Attending Provider: Stephanie Mueller MD   Primary Care Physician: Rishabh Esteban MD  Principal Problem:<principal problem not specified>    Consults  Subjective:     HPI: Lavelle Ladd is a pleasant 62 y old  man  who received a  - brain death kidney transplant on 16.  He has CKD stage 3 - GFR 30-59 and his baseline creatinine is between 1.6-1.8. He takes  prednisone and tacrolimus for maintenance immunosuppression.  MMF was stopped few months ago , s/p right BKA , has dry gangrene on his left great toe for few days, now developed purulent discharge, admitted for the same , Podiatry consulted , renal fn at his baseline ,        Past Medical History:   Diagnosis Date    DINORAH (acute kidney injury) 2016    Arthritis     Chronic obstructive pulmonary disease 2016    Coronary artery disease involving native coronary artery of native heart without angina pectoris 2016     donor kidney transplant for DM 16     Induction with Thymo x3 and IV solumedrol to total 875mg  Kidney Biopsy  2016: 16 glomeruli, ACR type 1 AVR type 2, significant microcirculatory changes, c4d negative, No DSA, 5 to10% fibrosis. Treated with thymo x8 2016- no rejection      Diastolic heart failure     Encounter for blood transfusion     ESRD on RRT since 10/2013 10/29/2013    Biopsy proven diabetic nephropathy and lymphoplasmacytic interstitial infiltrate not c/w with AIN (ddx sjogrens or assoc with tamm-horsefall protein extravasation)     GERD (gastroesophageal reflux disease)     History of hepatitis C, s/p successful Rx w/ SVR12 - 2017    Completed 12 weeks harvoni w/ SVR    Hyperlipidemia     Hypertension     Prophylactic immunotherapy     Proteinuria     PVD (peripheral vascular disease)  6/26/2017    RLE BKA CT 12/11/16 Extensive atherosclerotic disease of the aorta and mesenteric arteries.     Renal hypertension     Type 2 diabetes mellitus with diabetic neuropathy, with long-term current use of insulin 12/1/2016    Vitamin B12 deficiency        Past Surgical History:   Procedure Laterality Date    av bovine graft      Left UE    AV FISTULA PLACEMENT      left UE    CARDIAC CATHETERIZATION  02/2015    KIDNEY TRANSPLANT  05/21/2016    LEG AMPUTATION THROUGH KNEE  2011    right LE, started as nail puncture leading to diabetic ulcer       Review of patient's allergies indicates:  No Known Allergies  Current Facility-Administered Medications   Medication Frequency    dextrose 50% injection 12.5 g PRN    dextrose 50% injection 25 g PRN    glucagon (human recombinant) injection 1 mg PRN    glucose chewable tablet 16 g PRN    glucose chewable tablet 24 g PRN    hydrALAZINE injection 10 mg Q6H PRN    HYDROcodone-acetaminophen  mg per tablet 1 tablet Q6H PRN    insulin aspart U-100 pen 0-5 Units QID (AC + HS) PRN    piperacillin-tazobactam 4.5 g in dextrose 5 % 100 mL IVPB (ready to mix system) Q8H    predniSONE tablet 5 mg Daily    tacrolimus capsule 1.5 mg BID    [START ON 6/13/2018] vancomycin (VANCOCIN) 1,250 mg in sodium chloride 0.9% 250 mL IVPB Q18H     Family History     Problem Relation (Age of Onset)    Cancer Father    Diabetes Father    Heart failure Father, Mother    Stroke Father        Social History Main Topics    Smoking status: Former Smoker     Packs/day: 1.00     Years: 40.00     Quit date: 1/11/2013    Smokeless tobacco: Never Used    Alcohol use 3.6 oz/week     6 Cans of beer per week      Comment: 6 beers/week    Drug use: No    Sexual activity: No     Review of Systems   Constitutional: Negative for activity change and appetite change.   HENT: Negative for congestion and facial swelling.    Eyes: Negative for pain, discharge and redness.   Respiratory:  Negative for apnea, cough and chest tightness.    Cardiovascular: Negative for chest pain, palpitations and leg swelling.   Gastrointestinal: Negative for abdominal distention.   Genitourinary: Negative for difficulty urinating, dysuria and frequency.   Musculoskeletal: Negative for neck pain and neck stiffness.   Skin: Positive for wound. Negative for color change and rash.        Dry gangrene on left great toe , purulent collection noted    Neurological: Negative for dizziness, weakness and numbness.   Psychiatric/Behavioral: Negative for sleep disturbance.   All other systems reviewed and are negative.    Objective:     Vital Signs (Most Recent):  Temp: 98.2 °F (36.8 °C) (06/12/18 1108)  Pulse: 77 (06/12/18 1330)  Resp: 17 (06/12/18 1108)  BP: (!) 182/97 (06/12/18 1108)  SpO2: 97 % (06/12/18 1108)  O2 Device (Oxygen Therapy): room air (06/12/18 1108) Vital Signs (24h Range):  Temp:  [98.1 °F (36.7 °C)-98.4 °F (36.9 °C)] 98.2 °F (36.8 °C)  Pulse:  [73-82] 77  Resp:  [17-18] 17  SpO2:  [94 %-98 %] 97 %  BP: (137-190)/(84-97) 182/97     Weight: 98.4 kg (217 lb) (06/12/18 0814)  Body mass index is 30.27 kg/m².  Body surface area is 2.22 meters squared.    No intake/output data recorded.    Physical Exam   Constitutional: He is oriented to person, place, and time. He appears well-developed and well-nourished. No distress.   HENT:   Head: Normocephalic and atraumatic.   Eyes: Pupils are equal, round, and reactive to light. Right eye exhibits no discharge.   Neck: Normal range of motion. Neck supple. No tracheal deviation present. No thyromegaly present.   Cardiovascular: Normal rate, regular rhythm, normal heart sounds and intact distal pulses.  Exam reveals no gallop and no friction rub.    No murmur heard.  Pulmonary/Chest: Effort normal and breath sounds normal. He has no wheezes. He has no rales.   Abdominal: Soft. He exhibits no mass. There is no tenderness. There is no rebound and no guarding.   No allograft  tenderness    Musculoskeletal: Normal range of motion. He exhibits no edema.   S/p right BKA , left great toe with dry gangrene , purulent collection    Lymphadenopathy:     He has no cervical adenopathy.   Neurological: He is alert and oriented to person, place, and time.   Skin: Skin is warm. No rash noted. He is not diaphoretic. No erythema.   Nursing note and vitals reviewed.      Significant Labs:  Cardiac Markers: No results for input(s): CKMB, TROPONINT, MYOGLOBIN in the last 168 hours.  CBC:   Recent Labs  Lab 18  09   WBC 5.09   RBC 4.95   HGB 15.6   HCT 46.0      MCV 93   MCH 31.5*   MCHC 33.9     CMP:   Recent Labs  Lab 18   *   CALCIUM 9.4   ALBUMIN 3.5   PROT 7.0      K 4.4   CO2 23      BUN 23   CREATININE 1.7*   ALKPHOS 73   ALT 10   AST 17   BILITOT 0.4     Coagulation: No results for input(s): PT, INR, APTT in the last 168 hours.  LFTs:   Recent Labs  Lab 18  09   ALT 10   AST 17   ALKPHOS 73   BILITOT 0.4   PROT 7.0   ALBUMIN 3.5     All labs within the past 24 hours have been reviewed.    Significant Imaging: reviewed     Assessment/Plan:      donor kidney transplant for DM 16    1. S/p DDRT in 2016 : stable allograft fn , cont Prograf 1.5 mg bid and Pred 5 mg daily ,    2. HTN : resume home BP meds, follow    3. Left toe gangrene, awaiting Podiatry eval , started on broad spectrum abx     4. Metabolic acidosis : resume Bicarb supplements as before     5. Anemia of CKD : stable Hb             Thank you for your consult. I will follow-up with patient. Please contact us if you have any additional questions.     Total time spent 70 minutes including time needed to review the records,  patient  evaluation, documentation, face-to-face discussion with the patient,  primary team ,   more than 50% of the time was spent on coordination of care and counseling.       Carlos Staley MD   Nephrology  [facility]

## 2018-06-12 NOTE — PLAN OF CARE
Problem: Patient Care Overview  Goal: Plan of Care Review  Outcome: Ongoing (interventions implemented as appropriate)    Reviewed plan of care, including indications and possible side effects of administered medications. Remains free from injury at this time. Respirations even and unlabored. Bed locked and in lowest position. Call light within reach. Side rails up x2. Pt complains of pain. Administered Norco 10mg as prescribed. Telemetry monitor in place NSR. Chart check complete.

## 2018-06-12 NOTE — ED PROVIDER NOTES
SCRIBE #1 NOTE: I, Onelia Moreno, am scribing for, and in the presence of, Charito Oleary MD. I have scribed the entire note.      History      Chief Complaint   Patient presents with    Wound Infection     drainage on L foot wound. States he has podiatrist appt tomorrow. Was informed to go to ED if wound started to drain. Denies fever/chills at home.        Review of patient's allergies indicates:  No Known Allergies     HPI   HPI    2018, 8:17 AM   History obtained from the patient      History of Present Illness: Lavelle Ladd is a 62 y.o. male patient with PMHx of DM and renal transplant who presents to the Emergency Department for drainage from wound which onset gradually this morning. Patient reports injuring his L great toe when clipping his nails 3 weeks ago. Patient was evaluated in the ED for sxs 2 weeks ago and was prescribed Clindamycin. Patient states he has been compliant with the medication, but began having drainage from L toe this morning. Patient states he has a podiatry appointment tomorrow. Symptoms are constant and moderate in severity. No mitigating or exacerbating factors reported. No associated sxs included. Patient denies any fever, chills, L foot pain, L ankle pain, N/V/D, rash, extremity weakness/numbness/tingling, and all other sxs at this time. Patient reports renal transplant was 2 years ago. No further complaints or concerns at this time.     Arrival mode: Personal vehicle     PCP: Rishabh Esteban MD       Past Medical History:  Past Medical History:   Diagnosis Date    DINORAH (acute kidney injury) 2016    Arthritis     Chronic obstructive pulmonary disease 2016    Coronary artery disease involving native coronary artery of native heart without angina pectoris 2016     donor kidney transplant for DM 16     Induction with Thymo x3 and IV solumedrol to total 875mg  Kidney Biopsy  2016: 16 glomeruli, ACR type 1 AVR type 2, significant  microcirculatory changes, c4d negative, No DSA, 5 to10% fibrosis. Treated with thymo x8 6/21/2016- no rejection      Diastolic heart failure     Encounter for blood transfusion     ESRD on RRT since 10/2013 10/29/2013    Biopsy proven diabetic nephropathy and lymphoplasmacytic interstitial infiltrate not c/w with AIN (ddx sjogrens or assoc with tamm-horsefall protein extravasation)     GERD (gastroesophageal reflux disease)     History of hepatitis C, s/p successful Rx w/ SVR12 - 4/2017 4/5/2017    Completed 12 weeks harvoni w/ SVR    Hyperlipidemia     Hypertension     Prophylactic immunotherapy     Proteinuria     PVD (peripheral vascular disease) 6/26/2017    RLE BKA CT 12/11/16 Extensive atherosclerotic disease of the aorta and mesenteric arteries.     Renal hypertension     Type 2 diabetes mellitus with diabetic neuropathy, with long-term current use of insulin 12/1/2016    Vitamin B12 deficiency        Past Surgical History:  Past Surgical History:   Procedure Laterality Date    av bovine graft      Left UE    AV FISTULA PLACEMENT      left UE    CARDIAC CATHETERIZATION  02/2015    KIDNEY TRANSPLANT  05/21/2016    LEG AMPUTATION THROUGH KNEE  2011    right LE, started as nail puncture leading to diabetic ulcer         Family History:  Family History   Problem Relation Age of Onset    Cancer Father     Diabetes Father     Heart failure Father     Stroke Father     Heart failure Mother     Kidney disease Neg Hx        Social History:  Social History     Social History Main Topics    Smoking status: Former Smoker     Packs/day: 1.00     Years: 40.00     Quit date: 1/11/2013    Smokeless tobacco: Never Used    Alcohol use 3.6 oz/week     6 Cans of beer per week      Comment: 6 beers/week    Drug use: No    Sexual activity: No       ROS   Review of Systems   Constitutional: Negative for chills and fever.   HENT: Negative for sore throat.    Respiratory: Negative for shortness of breath.     Cardiovascular: Negative for chest pain.   Gastrointestinal: Negative for diarrhea, nausea and vomiting.   Genitourinary: Negative for dysuria.   Musculoskeletal: Negative for back pain.        (-) L foot pain, L ankle pain   Skin: Positive for wound (drainage from L great toe wound). Negative for rash.   Neurological: Negative for weakness and numbness.        (-) extremity tingling   Hematological: Does not bruise/bleed easily.   All other systems reviewed and are negative.      Physical Exam      Initial Vitals [06/12/18 0814]   BP Pulse Resp Temp SpO2   137/84 82 18 98.1 °F (36.7 °C) 98 %      MAP       --          Physical Exam  Nursing Notes and Vital Signs Reviewed.  Constitutional: Patient is in no acute distress. Well-developed and well-nourished.  Head: Atraumatic. Normocephalic.  Eyes: PERRL. EOM intact. Conjunctivae are not pale. No scleral icterus.  ENT: Mucous membranes are moist. Oropharynx is clear and symmetric.    Neck: Supple. Full ROM. No lymphadenopathy.  Cardiovascular: Regular rate. Regular rhythm. No murmurs, rubs, or gallops. Distal pulses are 2+ and symmetric.  Pulmonary/Chest: No respiratory distress. Clear to auscultation bilaterally. No wheezing or rales.  Abdominal: Soft and non-distended.  There is no tenderness.  No rebound, guarding, or rigidity. Good bowel sounds.  Genitourinary: No CVA tenderness  Musculoskeletal: Right BKA. No obvious deformities. No edema. No calf tenderness.  LLE: Necrosis noted to tip of L 1st toe with scant purulent drainage noted upon palpation. Mild surrounding erythema and warmth. No induration or fluctuance. No obvious abscess. Pulses diminished to L foot. Patient able to move all toes. No motor deficit. Sensation diminished, baseline for patient.  Skin: Warm and dry.  Neurological:  Alert, awake, and appropriate.  Normal speech.  No acute focal neurological deficits are appreciated.  Psychiatric: Normal affect. Good eye contact. Appropriate in  "content.                  ED Course    Procedures  ED Vital Signs:  Vitals:    06/12/18 0814 06/12/18 0858 06/12/18 0938 06/12/18 1000   BP: 137/84  (!) 190/90 (!) 180/97   Pulse: 82  73 75   Resp: 18      Temp: 98.1 °F (36.7 °C) 98.4 °F (36.9 °C)     TempSrc: Oral Oral     SpO2: 98%  95% 95%   Weight: 98.4 kg (217 lb)      Height: 5' 11" (1.803 m)       06/12/18 1030 06/12/18 1108   BP: (!) 150/90 (!) 182/97   Pulse: 75 78   Resp:  17   Temp:  98.2 °F (36.8 °C)   TempSrc:  Oral   SpO2: (!) 94% 97%   Weight:     Height:         Abnormal Lab Results:  Labs Reviewed   SEDIMENTATION RATE - Abnormal; Notable for the following:        Result Value    Sed Rate 11 (*)     All other components within normal limits   CBC W/ AUTO DIFFERENTIAL - Abnormal; Notable for the following:     MCH 31.5 (*)     All other components within normal limits   COMPREHENSIVE METABOLIC PANEL - Abnormal; Notable for the following:     Glucose 243 (*)     Creatinine 1.7 (*)     eGFR if  48 (*)     eGFR if non  42 (*)     All other components within normal limits   POCT GLUCOSE MONITORING CONTINUOUS - Abnormal; Notable for the following:     POC Glucose 233 (*)     All other components within normal limits   POCT GLUCOSE - Abnormal; Notable for the following:     POCT Glucose 233 (*)     All other components within normal limits   CULTURE, BLOOD   CULTURE, BLOOD   CULTURE, AEROBIC  (SPECIFY SOURCE)   C-REACTIVE PROTEIN        All Lab Results:  Results for orders placed or performed during the hospital encounter of 06/12/18   Sedimentation rate, manual   Result Value Ref Range    Sed Rate 11 (H) 0 - 10 mm/Hr   C-reactive protein   Result Value Ref Range    CRP 2.5 0.0 - 8.2 mg/L   CBC auto differential   Result Value Ref Range    WBC 5.09 3.90 - 12.70 K/uL    RBC 4.95 4.60 - 6.20 M/uL    Hemoglobin 15.6 14.0 - 18.0 g/dL    Hematocrit 46.0 40.0 - 54.0 %    MCV 93 82 - 98 fL    MCH 31.5 (H) 27.0 - 31.0 pg    MCHC 33.9 " 32.0 - 36.0 g/dL    RDW 13.4 11.5 - 14.5 %    Platelets 209 150 - 350 K/uL    MPV 10.1 9.2 - 12.9 fL    Gran # (ANC) 3.1 1.8 - 7.7 K/uL    Lymph # 1.4 1.0 - 4.8 K/uL    Mono # 0.6 0.3 - 1.0 K/uL    Eos # 0.1 0.0 - 0.5 K/uL    Baso # 0.01 0.00 - 0.20 K/uL    Gran% 61.1 38.0 - 73.0 %    Lymph% 26.7 18.0 - 48.0 %    Mono% 11.0 4.0 - 15.0 %    Eosinophil% 1.0 0.0 - 8.0 %    Basophil% 0.2 0.0 - 1.9 %    Differential Method Automated    Comprehensive metabolic panel   Result Value Ref Range    Sodium 136 136 - 145 mmol/L    Potassium 4.4 3.5 - 5.1 mmol/L    Chloride 102 95 - 110 mmol/L    CO2 23 23 - 29 mmol/L    Glucose 243 (H) 70 - 110 mg/dL    BUN, Bld 23 8 - 23 mg/dL    Creatinine 1.7 (H) 0.5 - 1.4 mg/dL    Calcium 9.4 8.7 - 10.5 mg/dL    Total Protein 7.0 6.0 - 8.4 g/dL    Albumin 3.5 3.5 - 5.2 g/dL    Total Bilirubin 0.4 0.1 - 1.0 mg/dL    Alkaline Phosphatase 73 55 - 135 U/L    AST 17 10 - 40 U/L    ALT 10 10 - 44 U/L    Anion Gap 11 8 - 16 mmol/L    eGFR if African American 48 (A) >60 mL/min/1.73 m^2    eGFR if non African American 42 (A) >60 mL/min/1.73 m^2   POCT glucose   Result Value Ref Range    POC Glucose 233 (A) 70 - 110 MG/DL   POCT glucose   Result Value Ref Range    POCT Glucose 233 (H) 70 - 110 mg/dL       Imaging Results:  Imaging Results          X-Ray Toe 2 or More Views Left (Final result)  Result time 06/12/18 08:43:42    Final result by Jose David Araiza MD (06/12/18 08:43:42)                 Impression:      Stable exam with small focal soft tissue defect of the distal left 1st toe.  No evidence of osteomyelitis by plain film at this time.  Chronic findings as described above.      Electronically signed by: Jose David Araiza MD  Date:    06/12/2018  Time:    08:43             Narrative:    EXAMINATION:  XR TOE 2 OR MORE VIEWS LEFT    CLINICAL HISTORY:  left first toe wound;    TECHNIQUE:  Standard radiography performed.    COMPARISON:  Plain x-ray 06/01/2018    FINDINGS:  There is a focal  defect of the soft tissues of the superior aspect of the left 1st toe along the distal margin.  Similar to the previous study.    No bone erosion or destruction.  No acute fracture.    Vascular calcification.  Old fracture deformity versus paratracheal a calcification adjacent to the 2nd metacarpophalangeal joint, similar to the previous study.                                        The Emergency Provider reviewed the vital signs and test results, which are outlined above.    ED Discussion     9:41 AM: Dr. Oleary discussed the pt's case with Dr. Pereyra (Transplant). Dr. Pereyra states patient be admitted at this facility services, [facility], patient requies.    9:58 AM: Re-evaluated pt. I have discussed test results, shared treatment plan, and the need for admission with patient at bedside. Pt expresses understanding at this time and agree with all information. All questions answered. Pt and family have no further questions or concerns at this time. Pt is ready for admit.    10:20 AM: Discussed case with JUVENAL Pelletier (Hospital Medicine) who agrees with current care and management of pt and accepts admission.   Admitting Service: Hospital medicine   Admitting Physician: Dr. Mueller  Admit to: Inpatient        ED Medication(s):  Medications   piperacillin-tazobactam 4.5 g in dextrose 5 % 100 mL IVPB (ready to mix system) (4.5 g Intravenous New Bag 6/12/18 1117)   hydrALAZINE injection 10 mg (not administered)   vancomycin (VANCOCIN) 1,250 mg in sodium chloride 0.9% 250 mL IVPB (1,250 mg Intravenous Trough Due 30 minutes Before Dose 6/13/18 2030)   HYDROcodone-acetaminophen  mg per tablet 1 tablet (1 tablet Oral Given 6/12/18 1230)   glucose chewable tablet 16 g (not administered)   glucose chewable tablet 24 g (not administered)   dextrose 50% injection 12.5 g (not administered)   dextrose 50% injection 25 g (not administered)   glucagon (human recombinant) injection 1 mg (not administered)    insulin aspart U-100 pen 0-5 Units (not administered)   vancomycin (VANCOCIN) 1,250 mg in sodium chloride 0.9% 250 mL IVPB (1,250 mg Intravenous New Bag 6/12/18 1484)       Current Discharge Medication List                Medical Decision Making    Medical Decision Making:   Clinical Tests:   Lab Tests: Ordered and Reviewed  Radiological Study: Ordered and Reviewed           Scribe Attestation:   Scribe #1: I performed the above scribed service and the documentation accurately describes the services I performed. I attest to the accuracy of the note.    Attending:   Physician Attestation Statement for Scribe #1: I, Charito Oleary MD, personally performed the services described in this documentation, as scribed by Onelia Moreno, in my presence, and it is both accurate and complete.          Clinical Impression       ICD-10-CM ICD-9-CM   1. Open wound of left great toe, subsequent encounter S91.102D V58.89     893.0   2. Elevated blood pressure reading R03.0 796.2   3. Renal transplant, status post Z94.0 V42.0   4. Immunocompromised state D84.9 279.3   5. Type 2 diabetes mellitus with other skin complication, with long-term current use of insulin E11.628 250.80    Z79.4 V58.67       Disposition:   Disposition: Admitted (Inpatient)  Condition: Fair         Charito Oleary MD  06/12/18 1708

## 2018-06-12 NOTE — H&P
[facility] Hospital Medicine  History & Physical    Patient Name: Lavelle Ladd  MRN: [id number]  Admission Date: 6/12/2018  Attending Physician: Stephanie Mueller MD   Primary Care Provider: Rishabh Esteban MD         Patient information was obtained from patient, past medical records and ER records.     Subjective:     Principal Problem:Open wound of left great toe    Chief Complaint:   Chief Complaint   Patient presents with    Wound Infection     drainage on L foot wound. States he has podiatrist appt tomorrow. Was informed to go to ED if wound started to drain. Denies fever/chills at home.         HPI: Lavelle Ladd is a 62 year old male with type II diabetes mellitus, diabetic neuropathy, chronic back pain, right above the knee amputation and history of kidney transplant on 5/21/16, on Prograf and prednisone, who presents to the ED for an infected wound on his left distal hallux. The patient states he injured his toe while cutting his toe nail approximately three weeks ago. He initially presented to the ED on 6/1/18, a week after the initial injury. In the ED, the ecchymotic tissue on the toe was debrided and he was sent home on clindamycin. The patient states he finished the clindamycin regimen on 6/9/18. He reports that he has a Podiatrist appointment tomorrow 6/13/18. However, he came in today because his foot began draining this morning. The patient has additional complaints of diffuse left foot pain, but he has decreased sensation at the site of his wound. He denies any fever, chills or known history of peripheral artery disease. He does express that he generally does not feel well and has chronic back pain. In the ED, purulent drainage was noted from his wound by the ED physician. His WBC count is within normal limits. His creatinine is at his baseline of 1.7. He has a mildly elevated ESR of 11, but CRP is normal. US of the left lower extremity is significant for evidence of  peripheral vascular disease of the left lower extremity arterial system with variable plaque, probable 50% stenosis of the distal left SFA and monophasic waveforms within the anterior tibial, posterior tibial and peroneal arteries of the left calf. X-ray of the left foot was negative for osteomyelitis.     Past Medical History:   Diagnosis Date    DINORAH (acute kidney injury) 2016    Arthritis     Chronic obstructive pulmonary disease 2016    Coronary artery disease involving native coronary artery of native heart without angina pectoris 2016     donor kidney transplant for DM 16     Induction with Thymo x3 and IV solumedrol to total 875mg  Kidney Biopsy  2016: 16 glomeruli, ACR type 1 AVR type 2, significant microcirculatory changes, c4d negative, No DSA, 5 to10% fibrosis. Treated with thymo x8 2016- no rejection      Diastolic heart failure     Encounter for blood transfusion     ESRD on RRT since 10/2013 10/29/2013    Biopsy proven diabetic nephropathy and lymphoplasmacytic interstitial infiltrate not c/w with AIN (ddx sjogrens or assoc with tamm-horsefall protein extravasation)     GERD (gastroesophageal reflux disease)     History of hepatitis C, s/p successful Rx w/ SVR12 - 2017    Completed 12 weeks harvoni w/ SVR    Hyperlipidemia     Hypertension     Prophylactic immunotherapy     Proteinuria     PVD (peripheral vascular disease) 2017    RLE BKA CT 16 Extensive atherosclerotic disease of the aorta and mesenteric arteries.     Renal hypertension     Type 2 diabetes mellitus with diabetic neuropathy, with long-term current use of insulin 2016    Vitamin B12 deficiency        Past Surgical History:   Procedure Laterality Date    av bovine graft      Left UE    AV FISTULA PLACEMENT      left UE    CARDIAC CATHETERIZATION  2015    KIDNEY TRANSPLANT  2016    LEG AMPUTATION THROUGH KNEE      right LE, started as nail  "puncture leading to diabetic ulcer       Review of patient's allergies indicates:  No Known Allergies    No current facility-administered medications on file prior to encounter.      Current Outpatient Prescriptions on File Prior to Encounter   Medication Sig    aspirin (ECOTRIN) 81 MG EC tablet Take 1 tablet (81 mg total) by mouth once daily.    BD INSULIN PEN NEEDLE UF SHORT 31 gauge x 5/16" Ndle     BD INSULIN SYRINGE ULTRA-FINE 1 mL 31 gauge x 5/16 Syrg USE ONE AS DIRECTED FOUR TIMES DAILY WITH MEALS AND AT BEDTIME    blood sugar diagnostic Strp 1 each by Misc.(Non-Drug; Combo Route) route 3 (three) times daily.    blood-glucose meter kit Use as instructed    budesonide-formoterol 160-4.5 mcg (SYMBICORT) 160-4.5 mcg/actuation HFAA Inhale 2 puffs into the lungs every 12 (twelve) hours. Wash out mouth after using    ergocalciferol (ERGOCALCIFEROL) 50,000 unit Cap Take 1 capsule (50,000 Units total) by mouth every 7 days. Take on Mondays    gabapentin (NEURONTIN) 300 MG capsule Take one tab bid and 2 at night. (Patient taking differently: 3 (three) times daily. Take one tab bid and 2 at night.)    inhalation device (BREATHERITE VALVED MDI CHAMBER) Use as directed for inhalation.    insulin NPH (NOVOLIN N) 100 unit/mL injection Take 25 units subq with breakfast and 15 units at bedtime    lancets Misc 1 each by Misc.(Non-Drug; Combo Route) route 3 (three) times daily.    NOVOLIN R REGULAR U-100 INSULN 100 unit/mL injection INJECT 6 UNITS WITH BREAKFAST AND 8 UNITS WITH DINNER, SUBCUTANEOUSLY 30 MIN BEFORE MEAL.    ondansetron (ZOFRAN-ODT) 4 MG TbDL Take 1 tablet (4 mg total) by mouth every 8 (eight) hours as needed.    pen needle, diabetic (EASY COMFORT PEN NEEDLES) 32 gauge x 5/32" Ndle Inject 1 each into the skin 3 (three) times daily.    pen needle, diabetic 31 gauge x 1/4" Ndle 1 each by Misc.(Non-Drug; Combo Route) route 4 (four) times daily.    predniSONE (DELTASONE) 5 MG tablet Take 1 tablet (5 " mg total) by mouth once daily.    sodium bicarbonate 650 MG tablet Take 4 tablets (2,600 mg total) by mouth 2 (two) times daily.    tacrolimus (PROGRAF) 0.5 MG Cap Take 3 capsules (1.5 mg total) by mouth every 12 (twelve) hours. Z94.0 Kidney Transplant    zolpidem (AMBIEN) 5 MG Tab Take 2 tablets (10 mg total) by mouth nightly as needed.    [DISCONTINUED] ALPRAZolam (XANAX) 1 MG tablet Take 1 tablet (1 mg total) by mouth 3 (three) times daily as needed for Anxiety.    [DISCONTINUED] back brace Misc Back brace    [DISCONTINUED] naproxen (NAPROSYN) 500 MG tablet      Family History     Problem Relation (Age of Onset)    Cancer Father    Diabetes Father    Heart failure Father, Mother    Stroke Father        Social History Main Topics    Smoking status: Former Smoker     Packs/day: 1.00     Years: 40.00     Quit date: 1/11/2013    Smokeless tobacco: Never Used    Alcohol use 3.6 oz/week     6 Cans of beer per week      Comment: 6 beers/week    Drug use: No    Sexual activity: No     Review of Systems   Constitutional: Negative for appetite change, chills, diaphoresis, fatigue, fever and unexpected weight change.        Positive for malaise.    HENT: Negative for congestion, ear pain, mouth sores, sore throat and trouble swallowing.    Eyes: Negative for pain and visual disturbance.   Respiratory: Negative for cough, chest tightness and shortness of breath.    Cardiovascular: Negative for chest pain, palpitations and leg swelling.   Gastrointestinal: Negative for abdominal pain, constipation, diarrhea and nausea.   Endocrine: Negative for cold intolerance, heat intolerance, polydipsia and polyuria.   Genitourinary: Negative for dysuria, frequency and hematuria.   Musculoskeletal: Positive for back pain (chronic) and myalgias (left lower extremity pain). Negative for arthralgias and neck pain.   Skin: Negative for pallor, rash and wound.   Allergic/Immunologic: Negative for environmental allergies and  immunocompromised state.   Neurological: Negative for dizziness, seizures, syncope, weakness, numbness and headaches.   Hematological: Negative for adenopathy. Does not bruise/bleed easily.   Psychiatric/Behavioral: Negative for agitation, confusion and sleep disturbance.     Objective:     Vital Signs (Most Recent):  Temp: 98.2 °F (36.8 °C) (06/12/18 1108)  Pulse: 77 (06/12/18 1330)  Resp: 17 (06/12/18 1108)  BP: (!) 182/97 (06/12/18 1108)  SpO2: 97 % (06/12/18 1108) Vital Signs (24h Range):  Temp:  [98.1 °F (36.7 °C)-98.4 °F (36.9 °C)] 98.2 °F (36.8 °C)  Pulse:  [73-82] 77  Resp:  [17-18] 17  SpO2:  [94 %-98 %] 97 %  BP: (137-190)/(84-97) 182/97     Weight: 98.4 kg (217 lb)  Body mass index is 30.27 kg/m².    Physical Exam   Constitutional: He is oriented to person, place, and time. He appears well-developed and well-nourished.   HENT:   Head: Normocephalic and atraumatic.   Eyes: Conjunctivae are normal.   Neck: Neck supple. No JVD present.   Cardiovascular: Normal rate, regular rhythm and normal heart sounds.    Pulmonary/Chest: Effort normal and breath sounds normal. He has no wheezes.   Abdominal: Soft. Bowel sounds are normal. He exhibits no distension. There is no tenderness.   Musculoskeletal: Normal range of motion.        Right knee: He exhibits deformity (BKA).   Neurological: He is alert and oriented to person, place, and time.   Skin: Skin is warm and dry. Rash (Erythematous, macular rash to torso. Apperars chronic. ) noted.   Necrotic wound to distal left great toe and ulceration with necrosis to dorsal surface of left greater toe. Surrounding erythema, mild edema and warmth.    Psychiatric: He has a normal mood and affect. His behavior is normal. Thought content normal.   Nursing note and vitals reviewed.                  Significant Labs:   CBC:   Recent Labs  Lab 06/12/18  0901   WBC 5.09   HGB 15.6   HCT 46.0        CMP:   Recent Labs  Lab 06/12/18  0901      K 4.4      CO2 23    *   BUN 23   CREATININE 1.7*   CALCIUM 9.4   PROT 7.0   ALBUMIN 3.5   BILITOT 0.4   ALKPHOS 73   AST 17   ALT 10   ANIONGAP 11   EGFRNONAA 42*     All pertinent labs within the past 24 hours have been reviewed.    Significant Imaging: I have reviewed all pertinent imaging results/findings within the past 24 hours.   Imaging Results          X-Ray Toe 2 or More Views Left (Final result)  Result time 18 08:43:42    Final result by Jose David Araiza MD (18 08:43:42)                 Impression:      Stable exam with small focal soft tissue defect of the distal left 1st toe.  No evidence of osteomyelitis by plain film at this time.  Chronic findings as described above.      Electronically signed by: Jose David Araiza MD  Date:    2018  Time:    08:43             Narrative:    EXAMINATION:  XR TOE 2 OR MORE VIEWS LEFT    CLINICAL HISTORY:  left first toe wound;    TECHNIQUE:  Standard radiography performed.    COMPARISON:  Plain x-ray 2018    FINDINGS:  There is a focal defect of the soft tissues of the superior aspect of the left 1st toe along the distal margin.  Similar to the previous study.    No bone erosion or destruction.  No acute fracture.    Vascular calcification.  Old fracture deformity versus paratracheal a calcification adjacent to the 2nd metacarpophalangeal joint, similar to the previous study.                                  Assessment/Plan:     * Open wound of left great toe    -Continue IV vancomycin and Zosyn.   -MRI of left foot.   -Consult Podiatry.   -NPO after midnight for possible Podiatry intervention.          Long-term use of immunosuppressant medication    Continue prednisone and Prograf.            donor kidney transplant for DM 16    -Continue prednisone and Prograf, per Nephrology.  -Monitor.         PVD (peripheral vascular disease)    -Vascular surgery consult.   -Continue ASA.         Type 2 diabetes mellitus with diabetic neuropathy,  with long-term current use of insulin    -Levemir and NISS.   -Diabetic diet.   -Continue gabapentin.           Chronic obstructive pulmonary disease    Continue inhaled medications.             VTE Risk Mitigation         Ordered     IP VTE HIGH RISK PATIENT  Once      06/12/18 1606     Place sequential compression device  Until discontinued      06/12/18 1044             JUVENAL Shay  Department of Hospital Medicine   [facility]

## 2018-06-12 NOTE — CONSULTS
Subjective:     Patient ID: Lavelle Ladd is a 62 y.o. male.    Chief Complaint: Wound Infection (drainage on L foot wound. States he has podiatrist appt tomorrow. Was informed to go to ED if wound started to drain. Denies fever/chills at home. )    Lavelle is a 62 y.o. male who presents to the ER for evaluation and treatment of high risk feet. Lavelle has a past medical history of DINORAH (acute kidney injury) (2016); Arthritis; Chronic obstructive pulmonary disease (2016); Coronary artery disease involving native coronary artery of native heart without angina pectoris (2016);  donor kidney transplant for DM 16; Diastolic heart failure; Encounter for blood transfusion; ESRD on RRT since 10/2013 (10/29/2013); GERD (gastroesophageal reflux disease); History of hepatitis C, s/p successful Rx w/ SVR12 - 2017 (2017); Hyperlipidemia; Hypertension; Prophylactic immunotherapy; Proteinuria; PVD (peripheral vascular disease) (2017); Renal hypertension; Type 2 diabetes mellitus with diabetic neuropathy, with long-term current use of insulin (2016); and Vitamin B12 deficiency. The patient's chief complaint is left toe wound. Patient states he clipped toenail about 4 weeks ago and cut and the wound started after that. Patient states he was seen in the ER last week and they cleaned toe up and gave him antibiotics. Patient states he has been applying neosporin to the toe and finished the antibiotics on Saturday. This patient has documented high risk feet requiring routine maintenance secondary to peripheral vascular disease.    History of Trauma: positive    Hemoglobin A1C   Date Value Ref Range Status   2017 7.1 (H) 4.0 - 5.6 % Final     Comment:     According to ADA guidelines, hemoglobin A1c <7.0% represents  optimal control in non-pregnant diabetic patients. Different  metrics may apply to specific patient populations.   Standards of Medical Care in Diabetes-2016.  For the  purpose of screening for the presence of diabetes:  <5.7%     Consistent with the absence of diabetes  5.7-6.4%  Consistent with increasing risk for diabetes   (prediabetes)  >or=6.5%  Consistent with diabetes  Currently, no consensus exists for use of hemoglobin A1c  for diagnosis of diabetes for children.  This Hemoglobin A1c assay has significant interference with fetal   hemoglobin   (HbF). The results are invalid for patients with abnormal amounts of   HbF,   including those with known Hereditary Persistence   of Fetal Hemoglobin. Heterozygous hemoglobin variants (HbAS, HbAC,   HbAD, HbAE, HbA2) do not significantly interfere with this assay;   however, presence of multiple variants in a sample may impact the %   interference.     03/20/2017 9.0 (H) 4.5 - 6.2 % Final     Comment:     According to ADA guidelines, hemoglobin A1C <7.0% represents  optimal control in non-pregnant diabetic patients.  Different  metrics may apply to specific populations.   Standards of Medical Care in Diabetes - 2016.  For the purpose of screening for the presence of diabetes:  <5.7%     Consistent with the absence of diabetes  5.7-6.4%  Consistent with increasing risk for diabetes   (prediabetes)  >or=6.5%  Consistent with diabetes  Currently no consensus exists for use of hemoglobin A1C  for diagnosis of diabetes for children.     12/01/2016 10.5 (H) 4.5 - 6.2 % Final     Comment:     According to ADA guidelines, hemoglobin A1C <7.0% represents  optimal control in non-pregnant diabetic patients.  Different  metrics may apply to specific populations.   Standards of Medical Care in Diabetes - 2016.  For the purpose of screening for the presence of diabetes:  <5.7%     Consistent with the absence of diabetes  5.7-6.4%  Consistent with increasing risk for diabetes   (prediabetes)  >or=6.5%  Consistent with diabetes  Currently no consensus exists for use of hemoglobin A1C  for diagnosis of diabetes for children.             Patient  Active Problem List   Diagnosis    Renal hypertension    GERD (gastroesophageal reflux disease)    Peptic ulcer disease    Malignant HTN with heart disease, w/o CHF, with chronic kidney disease    Acidosis    Essential hypertension    Acute diastolic heart failure    Gastroparesis     donor kidney transplant for DM 16    Long-term use of immunosuppressant medication    Prophylactic immunotherapy    Adverse effect of glucocorticoids and synthetic analogues, sequela    Osteoarthritis of lumbar spine    Osteoarthritis of thoracic spine with myelopathy    Type 2 diabetes mellitus with hyperglycemia, with long-term current use of insulin    Coronary artery disease involving native coronary artery of native heart without angina pectoris    Hyperlipidemia    Chronic obstructive pulmonary disease    Ulnar neuropathy    Chronic kidney disease (CKD), stage III (moderate)    Reactive airway disease    Kidney transplant rejection    Type 2 diabetes mellitus with retinopathy, with long-term current use of insulin    Type 2 diabetes mellitus with diabetic neuropathy, with long-term current use of insulin    H/O amputation    Cavitary lesion of lung    Opacity of lung on imaging study    Bacteremia due to Gram-negative bacteria    ESBL (extended spectrum beta-lactamase) producing bacteria infection    History of hepatitis C, s/p successful Rx w/ SVR - 2017    Kidney transplant recipient    Intractable vomiting with nausea    PVD (peripheral vascular disease)    Chronic bilateral low back pain with left-sided sciatica    Diabetic polyneuropathy    Other chest pain    Disc disease, degenerative, lumbar or lumbosacral    Numbness and tingling    Adjustment insomnia    Lumbar stenosis with neurogenic claudication    Open wound of left great toe       Current Discharge Medication List      CONTINUE these medications which have NOT CHANGED    Details   aspirin (ECOTRIN) 81 MG EC  "tablet Take 1 tablet (81 mg total) by mouth once daily.  Refills: 0      BD INSULIN PEN NEEDLE UF SHORT 31 gauge x 5/16" Ndle       BD INSULIN SYRINGE ULTRA-FINE 1 mL 31 gauge x 5/16 Syrg USE ONE AS DIRECTED FOUR TIMES DAILY WITH MEALS AND AT BEDTIME  Qty: 120 each, Refills: 10      blood sugar diagnostic Strp 1 each by Misc.(Non-Drug; Combo Route) route 3 (three) times daily.  Qty: 100 each, Refills: 11      blood-glucose meter kit Use as instructed  Qty: 1 each, Refills: 0      budesonide-formoterol 160-4.5 mcg (SYMBICORT) 160-4.5 mcg/actuation HFAA Inhale 2 puffs into the lungs every 12 (twelve) hours. Wash out mouth after using  Qty: 1 Inhaler, Refills: 12    Associated Diagnoses: Asthma with COPD      ergocalciferol (ERGOCALCIFEROL) 50,000 unit Cap Take 1 capsule (50,000 Units total) by mouth every 7 days. Take on Mondays  Qty: 4 capsule, Refills: 11      gabapentin (NEURONTIN) 300 MG capsule Take one tab bid and 2 at night.  Qty: 120 capsule, Refills: 11      inhalation device (BREATHERITE VALVED MDI CHAMBER) Use as directed for inhalation.  Qty: 1 Device, Refills: prn    Associated Diagnoses: Asthma with COPD      insulin NPH (NOVOLIN N) 100 unit/mL injection Take 25 units subq with breakfast and 15 units at bedtime  Qty: 4 vial, Refills: 0    Associated Diagnoses: Type 2 diabetes mellitus with hyperglycemia, with long-term current use of insulin      lancets Misc 1 each by Misc.(Non-Drug; Combo Route) route 3 (three) times daily.  Qty: 100 each, Refills: 11      NOVOLIN R REGULAR U-100 INSULN 100 unit/mL injection INJECT 6 UNITS WITH BREAKFAST AND 8 UNITS WITH DINNER, SUBCUTANEOUSLY 30 MIN BEFORE MEAL.  Qty: 10 mL, Refills: 1    Associated Diagnoses: Type 2 diabetes mellitus with hyperglycemia, with long-term current use of insulin      ondansetron (ZOFRAN-ODT) 4 MG TbDL Take 1 tablet (4 mg total) by mouth every 8 (eight) hours as needed.  Qty: 21 tablet, Refills: 1      pen needle, diabetic (EASY COMFORT " "PEN NEEDLES) 32 gauge x 5/32" Ndle Inject 1 each into the skin 3 (three) times daily.  Qty: 100 each, Refills: 11      pen needle, diabetic 31 gauge x 1/4" Ndle 1 each by Misc.(Non-Drug; Combo Route) route 4 (four) times daily.  Qty: 100 each, Refills: 0      predniSONE (DELTASONE) 5 MG tablet Take 1 tablet (5 mg total) by mouth once daily.  Qty: 30 tablet, Refills: 11      sodium bicarbonate 650 MG tablet Take 4 tablets (2,600 mg total) by mouth 2 (two) times daily.  Qty: 240 tablet, Refills: 11      tacrolimus (PROGRAF) 0.5 MG Cap Take 3 capsules (1.5 mg total) by mouth every 12 (twelve) hours. Z94.0 Kidney Transplant  Qty: 180 capsule, Refills: 11    Comments: KTx 5/21/16; Z94.0      zolpidem (AMBIEN) 5 MG Tab Take 2 tablets (10 mg total) by mouth nightly as needed.  Qty: 30 tablet, Refills: 1             Review of patient's allergies indicates:  No Known Allergies    Past Surgical History:   Procedure Laterality Date    av bovine graft      Left UE    AV FISTULA PLACEMENT      left UE    CARDIAC CATHETERIZATION  02/2015    KIDNEY TRANSPLANT  05/21/2016    LEG AMPUTATION THROUGH KNEE  2011    right LE, started as nail puncture leading to diabetic ulcer       Family History   Problem Relation Age of Onset    Cancer Father     Diabetes Father     Heart failure Father     Stroke Father     Heart failure Mother     Kidney disease Neg Hx        Social History     Social History    Marital status: Single     Spouse name: N/A    Number of children: N/A    Years of education: N/A     Occupational History    Disabled      Disabled     Social History Main Topics    Smoking status: Former Smoker     Packs/day: 1.00     Years: 40.00     Quit date: 1/11/2013    Smokeless tobacco: Never Used    Alcohol use 3.6 oz/week     6 Cans of beer per week      Comment: 6 beers/week    Drug use: No    Sexual activity: No     Other Topics Concern    Not on file     Social History Narrative    Lives alone. Retired " from construction and various jobs, now retired. Would like to return to work PT to alleviate boredom.       Vitals:    06/12/18 1108 06/12/18 1330 06/12/18 1522 06/12/18 1611   BP: (!) 182/97   (!) 168/81   Pulse: 78 77 72 73   Resp: 17      Temp: 98.2 °F (36.8 °C)   98.3 °F (36.8 °C)   TempSrc: Oral   Oral   SpO2: 97%   95%   Weight:       Height:           Hemoglobin A1C   Date Value Ref Range Status   11/03/2017 7.1 (H) 4.0 - 5.6 % Final     Comment:     According to ADA guidelines, hemoglobin A1c <7.0% represents  optimal control in non-pregnant diabetic patients. Different  metrics may apply to specific patient populations.   Standards of Medical Care in Diabetes-2016.  For the purpose of screening for the presence of diabetes:  <5.7%     Consistent with the absence of diabetes  5.7-6.4%  Consistent with increasing risk for diabetes   (prediabetes)  >or=6.5%  Consistent with diabetes  Currently, no consensus exists for use of hemoglobin A1c  for diagnosis of diabetes for children.  This Hemoglobin A1c assay has significant interference with fetal   hemoglobin   (HbF). The results are invalid for patients with abnormal amounts of   HbF,   including those with known Hereditary Persistence   of Fetal Hemoglobin. Heterozygous hemoglobin variants (HbAS, HbAC,   HbAD, HbAE, HbA2) do not significantly interfere with this assay;   however, presence of multiple variants in a sample may impact the %   interference.     03/20/2017 9.0 (H) 4.5 - 6.2 % Final     Comment:     According to ADA guidelines, hemoglobin A1C <7.0% represents  optimal control in non-pregnant diabetic patients.  Different  metrics may apply to specific populations.   Standards of Medical Care in Diabetes - 2016.  For the purpose of screening for the presence of diabetes:  <5.7%     Consistent with the absence of diabetes  5.7-6.4%  Consistent with increasing risk for diabetes   (prediabetes)  >or=6.5%  Consistent with diabetes  Currently no  consensus exists for use of hemoglobin A1C  for diagnosis of diabetes for children.     12/01/2016 10.5 (H) 4.5 - 6.2 % Final     Comment:     According to ADA guidelines, hemoglobin A1C <7.0% represents  optimal control in non-pregnant diabetic patients.  Different  metrics may apply to specific populations.   Standards of Medical Care in Diabetes - 2016.  For the purpose of screening for the presence of diabetes:  <5.7%     Consistent with the absence of diabetes  5.7-6.4%  Consistent with increasing risk for diabetes   (prediabetes)  >or=6.5%  Consistent with diabetes  Currently no consensus exists for use of hemoglobin A1C  for diagnosis of diabetes for children.         Review of Systems   Constitutional: Negative for chills and fever.   Respiratory: Negative for shortness of breath.    Cardiovascular: Positive for claudication. Negative for chest pain, palpitations, orthopnea and leg swelling.   Gastrointestinal: Negative for diarrhea, nausea and vomiting.   Musculoskeletal: Negative for joint pain.   Skin: Negative for rash.   Neurological: Negative for dizziness, tingling, sensory change, focal weakness and weakness.   Psychiatric/Behavioral: Negative.              Objective:      PHYSICAL EXAM: Apperance: Alert and orient in no distress,well developed, and with good attention to grooming and body habits  Lower Extremity Exam  VASCULAR: Dorsalis pedis pulses 0/4 left and Posterior Tibial pulses 1/4 left. Capillary fill time <4 seconds left. Mild edema observed left. Varicosities present left. Skin temperature of the lower extremities is warm to warm, proximal to distal. Hair growth absent left. (--) lymphangitis or (+) cellulitis noted left.  DERMATOLOGICAL: (+) edema, (+) erythema, (--) malodor, (--) drainage, (+) warmth to left foot.  Ulcer noted to left distal hallux and dorsal hallux  with eschar base. Ulcer measurement (dorsal) 0.3cm x 0.3cm (distal) 1cm x 1cm.  The ulcer does not extend into deeper  tissue and (--) sinus tracts exist.  The dorsum surface of the feet are soft and supple.  The plantar aspects of feet are dry and scaly. Webspaces 1,2,3,4 clean, dry and without evidence of break in skin integrity left.   NEUROLOGICAL: Light touch, sharp-dull, proprioception all present and equal bilaterally.    MUSCULOSKELETAL: Muscle strength is 5/5 for foot inverters, everters, plantarflexors, and dorsiflexors. Muscle tone is normal.  Inspection/palpation of bone, joints and muscles unremarkable with the exception of that noted above. Right BKA noted.                         Assessment:       Gangrene, left hallux  Open wound of left great toe, subsequent encounter    Elevated blood pressure reading    Renal transplant, status post    Immunocompromised state    Type 2 diabetes mellitus with other skin complication, with long-term current use of insulin    Other orders  -     Saline lock IV; Standing  -     Sedimentation rate, manual; Standing  -     C-reactive protein; Standing  -     CBC auto differential; Standing  -     Comprehensive metabolic panel; Standing  -     POCT glucose; Standing  -     X-Ray Toe 2 or More Views Left; Standing  -     Blood Culture #1 **CANNOT BE ORDERED STAT**; Standing  -     Blood Culture #2 **CANNOT BE ORDERED STAT**; Standing  -     Aerobic culture; Standing  -     Pharmacy to dose Vancomycin consult; Standing  -     piperacillin-tazobactam 4.5 g in dextrose 5 % 100 mL IVPB (ready to mix system); Inject 100 mLs (4.5 g total) into the vein every 8 (eight) hours.  -     vancomycin (VANCOCIN) 1,250 mg in sodium chloride 0.9% 250 mL IVPB; Inject 1,250 mg into the vein once.  -     POCT glucose; Standing  -     hydrALAZINE injection 10 mg; Inject 0.5 mLs (10 mg total) into the vein every 6 (six) hours as needed for SBP greater than (180).  -     Vital signs; Standing  -     Notify physician ; Standing  -     Cancel: IP VTE HIGH RISK PATIENT; Standing  -     Pulse Oximetry Q4H;  Standing  -     Full code; Standing  -     Admit to Inpatient; Standing  -     Cardiac Monitoring - Adult; Standing  -     Ambulate with assistance; Standing  -     Diet diabetic [facility]; 1500 Calorie; Standing  -     Place sequential compression device; Standing  -     Inpatient consult to Podiatry; Standing  -     vancomycin (VANCOCIN) 1,250 mg in sodium chloride 0.9% 250 mL IVPB; Inject 1,250 mg into the vein every 18 (eighteen) hours.  -     VANCOMYCIN, TROUGH before 3rd dose; Standing  -     HYDROcodone-acetaminophen  mg per tablet 1 tablet; Take 1 tablet by mouth every 6 (six) hours as needed for moderate pain 4-6/10 pain scale.  -     glucose chewable tablet 16 g; Take 4 tablets (16 g total) by mouth as needed for Blood Glucose less than (70 mg/dL X 1 dose for patients who can take PO.).  -     glucose chewable tablet 24 g; Take 6 tablets (24 g total) by mouth as needed for Blood Glucose less than (50 mg/dL X 1 dose for patients who can take PO.).  -     dextrose 50% injection 12.5 g; Inject 25 mLs (12.5 g total) into the vein as needed for For blood glucose (between 51 - 70 mg/dL if patient cannnot take PO but has IV access.).  -     dextrose 50% injection 25 g; Inject 50 mLs (25 g total) into the vein as needed for For blood glucose (less than 50 mg/dL if patient cannnot take PO but has IV access.).  -     glucagon (human recombinant) injection 1 mg; Inject 1 mg into the muscle as needed for Blood Glucose less than (70 mg/dL if patient is unconscious or obtunded and DOES NOT HAVE IV ACCESS.).  -     Recheck Blood Glucose:; Standing  -     Hemoglobin A1c if not done in past 6 weeks; Standing  -     POCT glucose; Standing  -     If any glucose result is less than 50 or greater than 400:; Standing  -     If 2nd result is less than 50 or greater than 400:; Standing  -     Do not admin Aspart correction between scheduled prandial Aspart; Standing  -     insulin aspart U-100 pen 0-5 Units; Inject  0-5 Units into the skin before meals and at bedtime as needed for For blood glucose (Hyperglycemia).  -     Cancel: MRI Foot Toes Without Contrast Left; Standing  -     US Lower Extremity Arteries Left; Standing  -     Inpatient consult to Podiatry; Standing  -     Inpatient consult to Nephrology; Standing  -     predniSONE tablet 5 mg; Take 1 tablet (5 mg total) by mouth once daily.  -     tacrolimus capsule 1.5 mg; Take 1.5 mg by mouth 0800, 1800.  -     Renal function panel; Standing  -     Tacrolimus level; Standing  -     MRI Foot Toes W WO Contrast Left; Standing  -     aspirin EC tablet 81 mg; Take 1 tablet (81 mg total) by mouth once daily.  -     gabapentin capsule 100 mg; Take 1 capsule (100 mg total) by mouth 3 (three) times daily.  -     sodium bicarbonate tablet 2,600 mg; Take 4 tablets (2,600 mg total) by mouth 2 (two) times daily.  -     zolpidem tablet 5 mg; Take 1 tablet (5 mg total) by mouth nightly as needed for Insomnia.  -     acetaminophen tablet 650 mg; Take 2 tablets (650 mg total) by mouth every 6 (six) hours as needed for mild pain 1-3/10 pain scale, Headaches or Temperature greater than 101.  -     ondansetron disintegrating tablet 8 mg; Take 1 tablet (8 mg total) by mouth every 8 (eight) hours as needed for Nausea/Vomiting (1st choice) - use as first treatment.  -     Basic metabolic panel; Standing  -     Magnesium; Standing  -     Phosphorus; Standing  -     CBC auto differential; Standing  -     Pulse Oximetry Q4H; Standing  -     IP VTE HIGH RISK PATIENT; Standing  -     Inpatient consult to Vascular Surgery; Standing  -     insulin detemir U-100 pen 5 Units; Inject 5 Units into the skin every evening.  -     Diet NPO; Standing  -     albuterol-ipratropium 2.5 mg-0.5 mg/3 mL nebulizer solution 3 mL; Take 3 mLs by nebulization every 6 (six) hours as needed for Wheezing.  -     Inhalation Treatment Q6H PRN; Standing  -     budesonide nebulizer solution 0.5 mg; Take 2 mLs (0.5 mg total)  by nebulization every 12 (twelve) hours.  -     Inhalation Treatment Daily; Standing  -     arformoterol nebulizer solution 15 mcg; Take 2 mLs (15 mcg total) by nebulization every 12 (twelve) hours.  -     Inhalation Treatment BID; Standing  -     POCT glucose; Standing          Plan:   Open wound of left great toe, subsequent encounter    Elevated blood pressure reading    Renal transplant, status post    Immunocompromised state    Type 2 diabetes mellitus with other skin complication, with long-term current use of insulin    Other orders  -     Saline lock IV; Standing  -     Sedimentation rate, manual; Standing  -     C-reactive protein; Standing  -     CBC auto differential; Standing  -     Comprehensive metabolic panel; Standing  -     POCT glucose; Standing  -     X-Ray Toe 2 or More Views Left; Standing  -     Blood Culture #1 **CANNOT BE ORDERED STAT**; Standing  -     Blood Culture #2 **CANNOT BE ORDERED STAT**; Standing  -     Aerobic culture; Standing  -     Pharmacy to dose Vancomycin consult; Standing  -     piperacillin-tazobactam 4.5 g in dextrose 5 % 100 mL IVPB (ready to mix system); Inject 100 mLs (4.5 g total) into the vein every 8 (eight) hours.  -     vancomycin (VANCOCIN) 1,250 mg in sodium chloride 0.9% 250 mL IVPB; Inject 1,250 mg into the vein once.  -     POCT glucose; Standing  -     hydrALAZINE injection 10 mg; Inject 0.5 mLs (10 mg total) into the vein every 6 (six) hours as needed for SBP greater than (180).  -     Vital signs; Standing  -     Notify physician ; Standing  -     Cancel: IP VTE HIGH RISK PATIENT; Standing  -     Pulse Oximetry Q4H; Standing  -     Full code; Standing  -     Admit to Inpatient; Standing  -     Cardiac Monitoring - Adult; Standing  -     Ambulate with assistance; Standing  -     Diet diabetic [facility]; 1500 Calorie; Standing  -     Place sequential compression device; Standing  -     Inpatient consult to Podiatry; Standing  -     vancomycin  (VANCOCIN) 1,250 mg in sodium chloride 0.9% 250 mL IVPB; Inject 1,250 mg into the vein every 18 (eighteen) hours.  -     VANCOMYCIN, TROUGH before 3rd dose; Standing  -     HYDROcodone-acetaminophen  mg per tablet 1 tablet; Take 1 tablet by mouth every 6 (six) hours as needed for moderate pain 4-6/10 pain scale.  -     glucose chewable tablet 16 g; Take 4 tablets (16 g total) by mouth as needed for Blood Glucose less than (70 mg/dL X 1 dose for patients who can take PO.).  -     glucose chewable tablet 24 g; Take 6 tablets (24 g total) by mouth as needed for Blood Glucose less than (50 mg/dL X 1 dose for patients who can take PO.).  -     dextrose 50% injection 12.5 g; Inject 25 mLs (12.5 g total) into the vein as needed for For blood glucose (between 51 - 70 mg/dL if patient cannnot take PO but has IV access.).  -     dextrose 50% injection 25 g; Inject 50 mLs (25 g total) into the vein as needed for For blood glucose (less than 50 mg/dL if patient cannnot take PO but has IV access.).  -     glucagon (human recombinant) injection 1 mg; Inject 1 mg into the muscle as needed for Blood Glucose less than (70 mg/dL if patient is unconscious or obtunded and DOES NOT HAVE IV ACCESS.).  -     Recheck Blood Glucose:; Standing  -     Hemoglobin A1c if not done in past 6 weeks; Standing  -     POCT glucose; Standing  -     If any glucose result is less than 50 or greater than 400:; Standing  -     If 2nd result is less than 50 or greater than 400:; Standing  -     Do not admin Aspart correction between scheduled prandial Aspart; Standing  -     insulin aspart U-100 pen 0-5 Units; Inject 0-5 Units into the skin before meals and at bedtime as needed for For blood glucose (Hyperglycemia).  -     Cancel: MRI Foot Toes Without Contrast Left; Standing  -     US Lower Extremity Arteries Left; Standing  -     Inpatient consult to Podiatry; Standing  -     Inpatient consult to Nephrology; Standing  -     predniSONE tablet 5 mg;  Take 1 tablet (5 mg total) by mouth once daily.  -     tacrolimus capsule 1.5 mg; Take 1.5 mg by mouth 0800, 1800.  -     Renal function panel; Standing  -     Tacrolimus level; Standing  -     MRI Foot Toes W WO Contrast Left; Standing  -     aspirin EC tablet 81 mg; Take 1 tablet (81 mg total) by mouth once daily.  -     gabapentin capsule 100 mg; Take 1 capsule (100 mg total) by mouth 3 (three) times daily.  -     sodium bicarbonate tablet 2,600 mg; Take 4 tablets (2,600 mg total) by mouth 2 (two) times daily.  -     zolpidem tablet 5 mg; Take 1 tablet (5 mg total) by mouth nightly as needed for Insomnia.  -     acetaminophen tablet 650 mg; Take 2 tablets (650 mg total) by mouth every 6 (six) hours as needed for mild pain 1-3/10 pain scale, Headaches or Temperature greater than 101.  -     ondansetron disintegrating tablet 8 mg; Take 1 tablet (8 mg total) by mouth every 8 (eight) hours as needed for Nausea/Vomiting (1st choice) - use as first treatment.  -     Basic metabolic panel; Standing  -     Magnesium; Standing  -     Phosphorus; Standing  -     CBC auto differential; Standing  -     Pulse Oximetry Q4H; Standing  -     IP VTE HIGH RISK PATIENT; Standing  -     Inpatient consult to Vascular Surgery; Standing  -     insulin detemir U-100 pen 5 Units; Inject 5 Units into the skin every evening.  -     Diet NPO; Standing  -     albuterol-ipratropium 2.5 mg-0.5 mg/3 mL nebulizer solution 3 mL; Take 3 mLs by nebulization every 6 (six) hours as needed for Wheezing.  -     Inhalation Treatment Q6H PRN; Standing  -     budesonide nebulizer solution 0.5 mg; Take 2 mLs (0.5 mg total) by nebulization every 12 (twelve) hours.  -     Inhalation Treatment Daily; Standing  -     arformoterol nebulizer solution 15 mcg; Take 2 mLs (15 mcg total) by nebulization every 12 (twelve) hours.  -     Inhalation Treatment BID; Standing  -     POCT glucose; Standing      I counseled the patient on his conditions, regarding findings  of my examination, my impressions, and usual treatment plan.   Discussed with patient in detail the treatment options for infected ulcer with bone infection, including (1) local wound care with IV antibiotic for 6-8 weeks, or (2) surgical excision(amputation) of infected bone. Risk for treatments including further limb loss, delayed healing, non-healing was also discussed. Patient states he understands both treatment options and would like to see the results from MRI and vascular evaluation.  Patient states he also understands that he may still need amputation.   Vascular consult pending.   MRI pending  Podiatry to continue to follow.               Sharnette Miles, DPM [facility]

## 2018-06-12 NOTE — SUBJECTIVE & OBJECTIVE
Past Medical History:   Diagnosis Date    DINORAH (acute kidney injury) 2016    Arthritis     Chronic obstructive pulmonary disease 2016    Coronary artery disease involving native coronary artery of native heart without angina pectoris 2016     donor kidney transplant for DM 16     Induction with Thymo x3 and IV solumedrol to total 875mg  Kidney Biopsy  2016: 16 glomeruli, ACR type 1 AVR type 2, significant microcirculatory changes, c4d negative, No DSA, 5 to10% fibrosis. Treated with thymo x8 2016- no rejection      Diastolic heart failure     Encounter for blood transfusion     ESRD on RRT since 10/2013 10/29/2013    Biopsy proven diabetic nephropathy and lymphoplasmacytic interstitial infiltrate not c/w with AIN (ddx sjogrens or assoc with tamm-horsefall protein extravasation)     GERD (gastroesophageal reflux disease)     History of hepatitis C, s/p successful Rx w/ SVR12 - 2017    Completed 12 weeks harvoni w/ SVR    Hyperlipidemia     Hypertension     Prophylactic immunotherapy     Proteinuria     PVD (peripheral vascular disease) 2017    RLE BKA CT 16 Extensive atherosclerotic disease of the aorta and mesenteric arteries.     Renal hypertension     Type 2 diabetes mellitus with diabetic neuropathy, with long-term current use of insulin 2016    Vitamin B12 deficiency        Past Surgical History:   Procedure Laterality Date    av bovine graft      Left UE    AV FISTULA PLACEMENT      left UE    CARDIAC CATHETERIZATION  2015    KIDNEY TRANSPLANT  2016    LEG AMPUTATION THROUGH KNEE  2011    right LE, started as nail puncture leading to diabetic ulcer       Review of patient's allergies indicates:  No Known Allergies  Current Facility-Administered Medications   Medication Frequency    dextrose 50% injection 12.5 g PRN    dextrose 50% injection 25 g PRN    glucagon (human recombinant) injection 1 mg PRN    glucose  chewable tablet 16 g PRN    glucose chewable tablet 24 g PRN    hydrALAZINE injection 10 mg Q6H PRN    HYDROcodone-acetaminophen  mg per tablet 1 tablet Q6H PRN    insulin aspart U-100 pen 0-5 Units QID (AC + HS) PRN    piperacillin-tazobactam 4.5 g in dextrose 5 % 100 mL IVPB (ready to mix system) Q8H    predniSONE tablet 5 mg Daily    tacrolimus capsule 1.5 mg BID    [START ON 6/13/2018] vancomycin (VANCOCIN) 1,250 mg in sodium chloride 0.9% 250 mL IVPB Q18H     Family History     Problem Relation (Age of Onset)    Cancer Father    Diabetes Father    Heart failure Father, Mother    Stroke Father        Social History Main Topics    Smoking status: Former Smoker     Packs/day: 1.00     Years: 40.00     Quit date: 1/11/2013    Smokeless tobacco: Never Used    Alcohol use 3.6 oz/week     6 Cans of beer per week      Comment: 6 beers/week    Drug use: No    Sexual activity: No     Review of Systems   Constitutional: Negative for activity change and appetite change.   HENT: Negative for congestion and facial swelling.    Eyes: Negative for pain, discharge and redness.   Respiratory: Negative for apnea, cough and chest tightness.    Cardiovascular: Negative for chest pain, palpitations and leg swelling.   Gastrointestinal: Negative for abdominal distention.   Genitourinary: Negative for difficulty urinating, dysuria and frequency.   Musculoskeletal: Negative for neck pain and neck stiffness.   Skin: Positive for wound. Negative for color change and rash.        Dry gangrene on left great toe , purulent collection noted    Neurological: Negative for dizziness, weakness and numbness.   Psychiatric/Behavioral: Negative for sleep disturbance.   All other systems reviewed and are negative.    Objective:     Vital Signs (Most Recent):  Temp: 98.2 °F (36.8 °C) (06/12/18 1108)  Pulse: 77 (06/12/18 1330)  Resp: 17 (06/12/18 1108)  BP: (!) 182/97 (06/12/18 1108)  SpO2: 97 % (06/12/18 1108)  O2 Device (Oxygen  Therapy): room air (06/12/18 1108) Vital Signs (24h Range):  Temp:  [98.1 °F (36.7 °C)-98.4 °F (36.9 °C)] 98.2 °F (36.8 °C)  Pulse:  [73-82] 77  Resp:  [17-18] 17  SpO2:  [94 %-98 %] 97 %  BP: (137-190)/(84-97) 182/97     Weight: 98.4 kg (217 lb) (06/12/18 0814)  Body mass index is 30.27 kg/m².  Body surface area is 2.22 meters squared.    No intake/output data recorded.    Physical Exam   Constitutional: He is oriented to person, place, and time. He appears well-developed and well-nourished. No distress.   HENT:   Head: Normocephalic and atraumatic.   Eyes: Pupils are equal, round, and reactive to light. Right eye exhibits no discharge.   Neck: Normal range of motion. Neck supple. No tracheal deviation present. No thyromegaly present.   Cardiovascular: Normal rate, regular rhythm, normal heart sounds and intact distal pulses.  Exam reveals no gallop and no friction rub.    No murmur heard.  Pulmonary/Chest: Effort normal and breath sounds normal. He has no wheezes. He has no rales.   Abdominal: Soft. He exhibits no mass. There is no tenderness. There is no rebound and no guarding.   No allograft tenderness    Musculoskeletal: Normal range of motion. He exhibits no edema.   S/p right BKA , left great toe with dry gangrene , purulent collection    Lymphadenopathy:     He has no cervical adenopathy.   Neurological: He is alert and oriented to person, place, and time.   Skin: Skin is warm. No rash noted. He is not diaphoretic. No erythema.   Nursing note and vitals reviewed.      Significant Labs:  Cardiac Markers: No results for input(s): CKMB, TROPONINT, MYOGLOBIN in the last 168 hours.  CBC:   Recent Labs  Lab 06/12/18  0901   WBC 5.09   RBC 4.95   HGB 15.6   HCT 46.0      MCV 93   MCH 31.5*   MCHC 33.9     CMP:   Recent Labs  Lab 06/12/18  0901   *   CALCIUM 9.4   ALBUMIN 3.5   PROT 7.0      K 4.4   CO2 23      BUN 23   CREATININE 1.7*   ALKPHOS 73   ALT 10   AST 17   BILITOT 0.4      Coagulation: No results for input(s): PT, INR, APTT in the last 168 hours.  LFTs:   Recent Labs  Lab 06/12/18  0901   ALT 10   AST 17   ALKPHOS 73   BILITOT 0.4   PROT 7.0   ALBUMIN 3.5     All labs within the past 24 hours have been reviewed.    Significant Imaging: reviewed

## 2018-06-12 NOTE — HPI
Lavelle Ladd is a pleasant 62 y old  man  who received a  - brain death kidney transplant on 16.  He has CKD stage 3 - GFR 30-59 and his baseline creatinine is between 1.6-1.8. He takes  prednisone and tacrolimus for maintenance immunosuppression. MMF was stopped few months ago , s/p right BKA , has dry gangrene on his left great toe for few days, now developed purulent discharge, admitted for the same , Podiatry consulted , renal fn at his baseline ,

## 2018-06-12 NOTE — SUBJECTIVE & OBJECTIVE
Past Medical History:   Diagnosis Date    DINORAH (acute kidney injury) 2016    Arthritis     Chronic obstructive pulmonary disease 2016    Coronary artery disease involving native coronary artery of native heart without angina pectoris 2016     donor kidney transplant for DM 16     Induction with Thymo x3 and IV solumedrol to total 875mg  Kidney Biopsy  2016: 16 glomeruli, ACR type 1 AVR type 2, significant microcirculatory changes, c4d negative, No DSA, 5 to10% fibrosis. Treated with thymo x8 2016- no rejection      Diastolic heart failure     Encounter for blood transfusion     ESRD on RRT since 10/2013 10/29/2013    Biopsy proven diabetic nephropathy and lymphoplasmacytic interstitial infiltrate not c/w with AIN (ddx sjogrens or assoc with tamm-horsefall protein extravasation)     GERD (gastroesophageal reflux disease)     History of hepatitis C, s/p successful Rx w/ SVR12 - 2017    Completed 12 weeks harvoni w/ SVR    Hyperlipidemia     Hypertension     Prophylactic immunotherapy     Proteinuria     PVD (peripheral vascular disease) 2017    RLE BKA CT 16 Extensive atherosclerotic disease of the aorta and mesenteric arteries.     Renal hypertension     Type 2 diabetes mellitus with diabetic neuropathy, with long-term current use of insulin 2016    Vitamin B12 deficiency        Past Surgical History:   Procedure Laterality Date    av bovine graft      Left UE    AV FISTULA PLACEMENT      left UE    CARDIAC CATHETERIZATION  2015    KIDNEY TRANSPLANT  2016    LEG AMPUTATION THROUGH KNEE  2011    right LE, started as nail puncture leading to diabetic ulcer       Review of patient's allergies indicates:  No Known Allergies    No current facility-administered medications on file prior to encounter.      Current Outpatient Prescriptions on File Prior to Encounter   Medication Sig    aspirin (ECOTRIN) 81 MG EC tablet Take 1  "tablet (81 mg total) by mouth once daily.    BD INSULIN PEN NEEDLE UF SHORT 31 gauge x 5/16" Ndle     BD INSULIN SYRINGE ULTRA-FINE 1 mL 31 gauge x 5/16 Syrg USE ONE AS DIRECTED FOUR TIMES DAILY WITH MEALS AND AT BEDTIME    blood sugar diagnostic Strp 1 each by Misc.(Non-Drug; Combo Route) route 3 (three) times daily.    blood-glucose meter kit Use as instructed    budesonide-formoterol 160-4.5 mcg (SYMBICORT) 160-4.5 mcg/actuation HFAA Inhale 2 puffs into the lungs every 12 (twelve) hours. Wash out mouth after using    ergocalciferol (ERGOCALCIFEROL) 50,000 unit Cap Take 1 capsule (50,000 Units total) by mouth every 7 days. Take on Mondays    gabapentin (NEURONTIN) 300 MG capsule Take one tab bid and 2 at night. (Patient taking differently: 3 (three) times daily. Take one tab bid and 2 at night.)    inhalation device (BREATHERITE VALVED MDI CHAMBER) Use as directed for inhalation.    insulin NPH (NOVOLIN N) 100 unit/mL injection Take 25 units subq with breakfast and 15 units at bedtime    lancets Misc 1 each by Misc.(Non-Drug; Combo Route) route 3 (three) times daily.    NOVOLIN R REGULAR U-100 INSULN 100 unit/mL injection INJECT 6 UNITS WITH BREAKFAST AND 8 UNITS WITH DINNER, SUBCUTANEOUSLY 30 MIN BEFORE MEAL.    ondansetron (ZOFRAN-ODT) 4 MG TbDL Take 1 tablet (4 mg total) by mouth every 8 (eight) hours as needed.    pen needle, diabetic (EASY COMFORT PEN NEEDLES) 32 gauge x 5/32" Ndle Inject 1 each into the skin 3 (three) times daily.    pen needle, diabetic 31 gauge x 1/4" Ndle 1 each by Misc.(Non-Drug; Combo Route) route 4 (four) times daily.    predniSONE (DELTASONE) 5 MG tablet Take 1 tablet (5 mg total) by mouth once daily.    sodium bicarbonate 650 MG tablet Take 4 tablets (2,600 mg total) by mouth 2 (two) times daily.    tacrolimus (PROGRAF) 0.5 MG Cap Take 3 capsules (1.5 mg total) by mouth every 12 (twelve) hours. Z94.0 Kidney Transplant    zolpidem (AMBIEN) 5 MG Tab Take 2 tablets (10 " mg total) by mouth nightly as needed.    [DISCONTINUED] ALPRAZolam (XANAX) 1 MG tablet Take 1 tablet (1 mg total) by mouth 3 (three) times daily as needed for Anxiety.    [DISCONTINUED] back brace Misc Back brace    [DISCONTINUED] naproxen (NAPROSYN) 500 MG tablet      Family History     Problem Relation (Age of Onset)    Cancer Father    Diabetes Father    Heart failure Father, Mother    Stroke Father        Social History Main Topics    Smoking status: Former Smoker     Packs/day: 1.00     Years: 40.00     Quit date: 1/11/2013    Smokeless tobacco: Never Used    Alcohol use 3.6 oz/week     6 Cans of beer per week      Comment: 6 beers/week    Drug use: No    Sexual activity: No     Review of Systems   Constitutional: Negative for appetite change, chills, diaphoresis, fatigue, fever and unexpected weight change.        Positive for malaise.    HENT: Negative for congestion, ear pain, mouth sores, sore throat and trouble swallowing.    Eyes: Negative for pain and visual disturbance.   Respiratory: Negative for cough, chest tightness and shortness of breath.    Cardiovascular: Negative for chest pain, palpitations and leg swelling.   Gastrointestinal: Negative for abdominal pain, constipation, diarrhea and nausea.   Endocrine: Negative for cold intolerance, heat intolerance, polydipsia and polyuria.   Genitourinary: Negative for dysuria, frequency and hematuria.   Musculoskeletal: Positive for back pain (chronic) and myalgias (left lower extremity pain). Negative for arthralgias and neck pain.   Skin: Negative for pallor, rash and wound.   Allergic/Immunologic: Negative for environmental allergies and immunocompromised state.   Neurological: Negative for dizziness, seizures, syncope, weakness, numbness and headaches.   Hematological: Negative for adenopathy. Does not bruise/bleed easily.   Psychiatric/Behavioral: Negative for agitation, confusion and sleep disturbance.     Objective:     Vital Signs (Most  Recent):  Temp: 98.2 °F (36.8 °C) (06/12/18 1108)  Pulse: 77 (06/12/18 1330)  Resp: 17 (06/12/18 1108)  BP: (!) 182/97 (06/12/18 1108)  SpO2: 97 % (06/12/18 1108) Vital Signs (24h Range):  Temp:  [98.1 °F (36.7 °C)-98.4 °F (36.9 °C)] 98.2 °F (36.8 °C)  Pulse:  [73-82] 77  Resp:  [17-18] 17  SpO2:  [94 %-98 %] 97 %  BP: (137-190)/(84-97) 182/97     Weight: 98.4 kg (217 lb)  Body mass index is 30.27 kg/m².    Physical Exam   Constitutional: He is oriented to person, place, and time. He appears well-developed and well-nourished.   HENT:   Head: Normocephalic and atraumatic.   Eyes: Conjunctivae are normal.   Neck: Neck supple. No JVD present.   Cardiovascular: Normal rate, regular rhythm and normal heart sounds.    Pulmonary/Chest: Effort normal and breath sounds normal. He has no wheezes.   Abdominal: Soft. Bowel sounds are normal. He exhibits no distension. There is no tenderness.   Musculoskeletal: Normal range of motion.        Right knee: He exhibits deformity (BKA).   Neurological: He is alert and oriented to person, place, and time.   Skin: Skin is warm and dry. Rash (Erythematous, macular rash to torso. Apperars chronic. ) noted.   Necrotic wound to distal left great toe and ulceration with necrosis to dorsal surface of left greater toe. Surrounding erythema, mild edema and warmth.    Psychiatric: He has a normal mood and affect. His behavior is normal. Thought content normal.   Nursing note and vitals reviewed.                  Significant Labs:   CBC:   Recent Labs  Lab 06/12/18  0901   WBC 5.09   HGB 15.6   HCT 46.0        CMP:   Recent Labs  Lab 06/12/18  0901      K 4.4      CO2 23   *   BUN 23   CREATININE 1.7*   CALCIUM 9.4   PROT 7.0   ALBUMIN 3.5   BILITOT 0.4   ALKPHOS 73   AST 17   ALT 10   ANIONGAP 11   EGFRNONAA 42*     All pertinent labs within the past 24 hours have been reviewed.    Significant Imaging: I have reviewed all pertinent imaging results/findings within the  past 24 hours.   Imaging Results          X-Ray Toe 2 or More Views Left (Final result)  Result time 06/12/18 08:43:42    Final result by Jose David Araiza MD (06/12/18 08:43:42)                 Impression:      Stable exam with small focal soft tissue defect of the distal left 1st toe.  No evidence of osteomyelitis by plain film at this time.  Chronic findings as described above.      Electronically signed by: Jose David Araiza MD  Date:    06/12/2018  Time:    08:43             Narrative:    EXAMINATION:  XR TOE 2 OR MORE VIEWS LEFT    CLINICAL HISTORY:  left first toe wound;    TECHNIQUE:  Standard radiography performed.    COMPARISON:  Plain x-ray 06/01/2018    FINDINGS:  There is a focal defect of the soft tissues of the superior aspect of the left 1st toe along the distal margin.  Similar to the previous study.    No bone erosion or destruction.  No acute fracture.    Vascular calcification.  Old fracture deformity versus paratracheal a calcification adjacent to the 2nd metacarpophalangeal joint, similar to the previous study.

## 2018-06-12 NOTE — HPI
Lavelle Ladd is a 62 year old male with type II diabetes mellitus, diabetic neuropathy, chronic back pain, right above the knee amputation and history of kidney transplant on 5/21/16, on Prograf and prednisone, who presents to the ED for an infected wound on his left distal hallux. The patient states he injured his toe while cutting his toe nail approximately three weeks ago. He initially presented to the ED on 6/1/18, a week after the initial injury. In the ED, the ecchymotic tissue on the toe was debrided and he was sent home on clindamycin. The patient states he finished the clindamycin regimen on 6/9/18. He reports that he has a Podiatrist appointment tomorrow 6/13/18. However, he came in today because his foot began draining this morning. The patient has additional complaints of diffuse left foot pain, but he has decreased sensation at the site of his wound. He denies any fever, chills or known history of peripheral artery disease. He does express that he generally does not feel well and has chronic back pain. In the ED, purulent drainage was noted from his wound by the ED physician. His WBC count is within normal limits. His creatinine is at his baseline of 1.7. He has a mildly elevated ESR of 11, but CRP is normal. US of the left lower extremity is significant for evidence of peripheral vascular disease of the left lower extremity arterial system with variable plaque, probable 50% stenosis of the distal left SFA and monophasic waveforms within the anterior tibial, posterior tibial and peroneal arteries of the left calf. X-ray of the left foot was negative for osteomyelitis.

## 2018-06-12 NOTE — ASSESSMENT & PLAN NOTE
-Continue IV vancomycin and Zosyn.   -MRI of left foot.   -Consult Podiatry.   -NPO after midnight for possible Podiatry intervention.

## 2018-06-13 PROBLEM — I96 GANGRENE: Status: ACTIVE | Noted: 2018-06-13

## 2018-06-13 LAB
ALBUMIN SERPL BCP-MCNC: 3.3 G/DL
ANION GAP SERPL CALC-SCNC: 8 MMOL/L
ANION GAP SERPL CALC-SCNC: 8 MMOL/L
BASOPHILS # BLD AUTO: 0.01 K/UL
BASOPHILS NFR BLD: 0.2 %
BUN SERPL-MCNC: 24 MG/DL
BUN SERPL-MCNC: 24 MG/DL
CALCIUM SERPL-MCNC: 9.2 MG/DL
CALCIUM SERPL-MCNC: 9.3 MG/DL
CHLORIDE SERPL-SCNC: 104 MMOL/L
CHLORIDE SERPL-SCNC: 104 MMOL/L
CO2 SERPL-SCNC: 23 MMOL/L
CO2 SERPL-SCNC: 23 MMOL/L
CREAT SERPL-MCNC: 1.8 MG/DL
CREAT SERPL-MCNC: 1.8 MG/DL
DIFFERENTIAL METHOD: ABNORMAL
EOSINOPHIL # BLD AUTO: 0.1 K/UL
EOSINOPHIL NFR BLD: 1.8 %
ERYTHROCYTE [DISTWIDTH] IN BLOOD BY AUTOMATED COUNT: 13.6 %
EST. GFR  (AFRICAN AMERICAN): 45 ML/MIN/1.73 M^2
EST. GFR  (AFRICAN AMERICAN): 45 ML/MIN/1.73 M^2
EST. GFR  (NON AFRICAN AMERICAN): 39 ML/MIN/1.73 M^2
EST. GFR  (NON AFRICAN AMERICAN): 39 ML/MIN/1.73 M^2
GLUCOSE SERPL-MCNC: 211 MG/DL
GLUCOSE SERPL-MCNC: 212 MG/DL
HCT VFR BLD AUTO: 46.3 %
HGB BLD-MCNC: 15.5 G/DL
LYMPHOCYTES # BLD AUTO: 1.6 K/UL
LYMPHOCYTES NFR BLD: 35.9 %
MAGNESIUM SERPL-MCNC: 1.9 MG/DL
MCH RBC QN AUTO: 31.4 PG
MCHC RBC AUTO-ENTMCNC: 33.5 G/DL
MCV RBC AUTO: 94 FL
MONOCYTES # BLD AUTO: 0.4 K/UL
MONOCYTES NFR BLD: 9.7 %
NEUTROPHILS # BLD AUTO: 2.3 K/UL
NEUTROPHILS NFR BLD: 52.4 %
PHOSPHATE SERPL-MCNC: 2.8 MG/DL
PHOSPHATE SERPL-MCNC: 2.9 MG/DL
PLATELET # BLD AUTO: 198 K/UL
PMV BLD AUTO: 10.2 FL
POCT GLUCOSE: 208 MG/DL (ref 70–110)
POCT GLUCOSE: 210 MG/DL (ref 70–110)
POCT GLUCOSE: 252 MG/DL (ref 70–110)
POCT GLUCOSE: 333 MG/DL (ref 70–110)
POTASSIUM SERPL-SCNC: 4.1 MMOL/L
POTASSIUM SERPL-SCNC: 4.1 MMOL/L
RBC # BLD AUTO: 4.94 M/UL
SODIUM SERPL-SCNC: 135 MMOL/L
SODIUM SERPL-SCNC: 135 MMOL/L
VANCOMYCIN TROUGH SERPL-MCNC: 11.6 UG/ML
WBC # BLD AUTO: 4.35 K/UL

## 2018-06-13 PROCEDURE — 25000003 PHARM REV CODE 250: Performed by: PHYSICIAN ASSISTANT

## 2018-06-13 PROCEDURE — 83735 ASSAY OF MAGNESIUM: CPT

## 2018-06-13 PROCEDURE — 63600175 PHARM REV CODE 636 W HCPCS: Performed by: PHYSICIAN ASSISTANT

## 2018-06-13 PROCEDURE — 99232 SBSQ HOSP IP/OBS MODERATE 35: CPT | Mod: ,,, | Performed by: PODIATRIST

## 2018-06-13 PROCEDURE — 63600175 PHARM REV CODE 636 W HCPCS: Performed by: EMERGENCY MEDICINE

## 2018-06-13 PROCEDURE — 94640 AIRWAY INHALATION TREATMENT: CPT

## 2018-06-13 PROCEDURE — 63600175 PHARM REV CODE 636 W HCPCS: Performed by: INTERNAL MEDICINE

## 2018-06-13 PROCEDURE — 84100 ASSAY OF PHOSPHORUS: CPT

## 2018-06-13 PROCEDURE — 80048 BASIC METABOLIC PNL TOTAL CA: CPT

## 2018-06-13 PROCEDURE — 25000003 PHARM REV CODE 250: Performed by: EMERGENCY MEDICINE

## 2018-06-13 PROCEDURE — 25000003 PHARM REV CODE 250: Performed by: INTERNAL MEDICINE

## 2018-06-13 PROCEDURE — 99233 SBSQ HOSP IP/OBS HIGH 50: CPT | Mod: ,,, | Performed by: INTERNAL MEDICINE

## 2018-06-13 PROCEDURE — 96372 THER/PROPH/DIAG INJ SC/IM: CPT

## 2018-06-13 PROCEDURE — 80197 ASSAY OF TACROLIMUS: CPT

## 2018-06-13 PROCEDURE — 80202 ASSAY OF VANCOMYCIN: CPT

## 2018-06-13 PROCEDURE — 85025 COMPLETE CBC W/AUTO DIFF WBC: CPT

## 2018-06-13 PROCEDURE — 21400001 HC TELEMETRY ROOM

## 2018-06-13 PROCEDURE — 94761 N-INVAS EAR/PLS OXIMETRY MLT: CPT

## 2018-06-13 PROCEDURE — 36415 COLL VENOUS BLD VENIPUNCTURE: CPT

## 2018-06-13 PROCEDURE — 25000242 PHARM REV CODE 250 ALT 637 W/ HCPCS: Performed by: PHYSICIAN ASSISTANT

## 2018-06-13 PROCEDURE — 80069 RENAL FUNCTION PANEL: CPT

## 2018-06-13 RX ORDER — OXYCODONE AND ACETAMINOPHEN 5; 325 MG/1; MG/1
2 TABLET ORAL EVERY 6 HOURS PRN
Status: DISCONTINUED | OUTPATIENT
Start: 2018-06-13 | End: 2018-06-15 | Stop reason: HOSPADM

## 2018-06-13 RX ORDER — SODIUM CHLORIDE 9 MG/ML
INJECTION, SOLUTION INTRAVENOUS CONTINUOUS
Status: DISCONTINUED | OUTPATIENT
Start: 2018-06-13 | End: 2018-06-15

## 2018-06-13 RX ORDER — AMLODIPINE BESYLATE 2.5 MG/1
2.5 TABLET ORAL DAILY
Status: DISCONTINUED | OUTPATIENT
Start: 2018-06-13 | End: 2018-06-15 | Stop reason: HOSPADM

## 2018-06-13 RX ADMIN — VANCOMYCIN HYDROCHLORIDE 1250 MG: 1 INJECTION, POWDER, LYOPHILIZED, FOR SOLUTION INTRAVENOUS at 10:06

## 2018-06-13 RX ADMIN — HYDROCODONE BITARTRATE AND ACETAMINOPHEN 1 TABLET: 10; 325 TABLET ORAL at 06:06

## 2018-06-13 RX ADMIN — INSULIN ASPART 2 UNITS: 100 INJECTION, SOLUTION INTRAVENOUS; SUBCUTANEOUS at 11:06

## 2018-06-13 RX ADMIN — AMLODIPINE BESYLATE 2.5 MG: 2.5 TABLET ORAL at 03:06

## 2018-06-13 RX ADMIN — PREDNISONE 5 MG: 5 TABLET ORAL at 08:06

## 2018-06-13 RX ADMIN — SODIUM BICARBONATE TAB 650 MG 2600 MG: 650 TAB at 09:06

## 2018-06-13 RX ADMIN — TACROLIMUS 1.5 MG: 1 CAPSULE ORAL at 05:06

## 2018-06-13 RX ADMIN — INSULIN ASPART 2 UNITS: 100 INJECTION, SOLUTION INTRAVENOUS; SUBCUTANEOUS at 05:06

## 2018-06-13 RX ADMIN — GABAPENTIN 100 MG: 100 CAPSULE ORAL at 02:06

## 2018-06-13 RX ADMIN — PIPERACILLIN AND TAZOBACTAM 4.5 G: 4; .5 INJECTION, POWDER, LYOPHILIZED, FOR SOLUTION INTRAVENOUS; PARENTERAL at 04:06

## 2018-06-13 RX ADMIN — ARFORMOTEROL TARTRATE 15 MCG: 15 SOLUTION RESPIRATORY (INHALATION) at 07:06

## 2018-06-13 RX ADMIN — GABAPENTIN 100 MG: 100 CAPSULE ORAL at 08:06

## 2018-06-13 RX ADMIN — HYDROCODONE BITARTRATE AND ACETAMINOPHEN 1 TABLET: 10; 325 TABLET ORAL at 01:06

## 2018-06-13 RX ADMIN — VANCOMYCIN HYDROCHLORIDE 1250 MG: 1 INJECTION, POWDER, LYOPHILIZED, FOR SOLUTION INTRAVENOUS at 02:06

## 2018-06-13 RX ADMIN — PIPERACILLIN AND TAZOBACTAM 4.5 G: 4; .5 INJECTION, POWDER, LYOPHILIZED, FOR SOLUTION INTRAVENOUS; PARENTERAL at 07:06

## 2018-06-13 RX ADMIN — SODIUM BICARBONATE TAB 650 MG 2600 MG: 650 TAB at 08:06

## 2018-06-13 RX ADMIN — TACROLIMUS 1.5 MG: 1 CAPSULE ORAL at 08:06

## 2018-06-13 RX ADMIN — SODIUM CHLORIDE: 0.9 INJECTION, SOLUTION INTRAVENOUS at 03:06

## 2018-06-13 RX ADMIN — BUDESONIDE 0.5 MG: 0.5 SUSPENSION RESPIRATORY (INHALATION) at 07:06

## 2018-06-13 RX ADMIN — INSULIN ASPART 4 UNITS: 100 INJECTION, SOLUTION INTRAVENOUS; SUBCUTANEOUS at 05:06

## 2018-06-13 RX ADMIN — HYDRALAZINE HYDROCHLORIDE 10 MG: 20 INJECTION INTRAMUSCULAR; INTRAVENOUS at 11:06

## 2018-06-13 RX ADMIN — GABAPENTIN 100 MG: 100 CAPSULE ORAL at 09:06

## 2018-06-13 RX ADMIN — OXYCODONE HYDROCHLORIDE AND ACETAMINOPHEN 2 TABLET: 5; 325 TABLET ORAL at 07:06

## 2018-06-13 RX ADMIN — HYDRALAZINE HYDROCHLORIDE 10 MG: 20 INJECTION INTRAMUSCULAR; INTRAVENOUS at 08:06

## 2018-06-13 RX ADMIN — HYDROCODONE BITARTRATE AND ACETAMINOPHEN 1 TABLET: 10; 325 TABLET ORAL at 12:06

## 2018-06-13 RX ADMIN — INSULIN DETEMIR 5 UNITS: 100 INJECTION, SOLUTION SUBCUTANEOUS at 09:06

## 2018-06-13 RX ADMIN — PIPERACILLIN AND TAZOBACTAM 4.5 G: 4; .5 INJECTION, POWDER, LYOPHILIZED, FOR SOLUTION INTRAVENOUS; PARENTERAL at 11:06

## 2018-06-13 NOTE — ASSESSMENT & PLAN NOTE
1. S/p DDRT in 2016 : stable allograft fn , cont Prograf 1.5 mg bid and Pred 5 mg daily ,    2. HTN : resume home BP meds, follow , add Aml    3. Left toe gangrene,  on broad spectrum abx , reviewed Vascular and Podiatry notes, awaiting angiogram, discussed with vascular, will start gently hydration ,     4. Metabolic acidosis : on  Bicarb supplements     5. Anemia of CKD : stable Hb

## 2018-06-13 NOTE — HOSPITAL COURSE
6/13  Patient complaining of pain in toe . Waiting on vascular recommendation . Continue with iv antibotics   6/14  Patient will have arteriogram tomorrow   6/15  Patient was seen and examined today . Case disscused with poditry dr Hill . Ok to discharge on empirically antibotics for 7 days . Follow up with dr ball outpatient . And follow up with dr monique vascular in two weeks . Added norvasc to control blood pressure . Ok to take with tacrolimus discuss with nephrology

## 2018-06-13 NOTE — PROGRESS NOTES
Podiatry Progress Note    HD: 2  IV antibiotics: vancomycin and zosyn    HPI:    Lavelle Ladd is a 62 y.o. male seen bedside for left hallux gangrene.  Patient relates that he was seen by the vascular surgeon today to his scheduling an angiogram for tomorrow.  He states that the toe currently is dry that he did see a droplet of pus yesterday morning on the corner of the nail plate.     Past Medical History:   Diagnosis Date    DINORAH (acute kidney injury) 2016    Arthritis     Chronic obstructive pulmonary disease 2016    Coronary artery disease involving native coronary artery of native heart without angina pectoris 2016     donor kidney transplant for DM 16     Induction with Thymo x3 and IV solumedrol to total 875mg  Kidney Biopsy  2016: 16 glomeruli, ACR type 1 AVR type 2, significant microcirculatory changes, c4d negative, No DSA, 5 to10% fibrosis. Treated with thymo x8 2016- no rejection      Diastolic heart failure     Encounter for blood transfusion     ESRD on RRT since 10/2013 10/29/2013    Biopsy proven diabetic nephropathy and lymphoplasmacytic interstitial infiltrate not c/w with AIN (ddx sjogrens or assoc with tamm-horsefall protein extravasation)     GERD (gastroesophageal reflux disease)     History of hepatitis C, s/p successful Rx w/ SVR12 - 2017    Completed 12 weeks harvoni w/ SVR    Hyperlipidemia     Hypertension     Prophylactic immunotherapy     Proteinuria     PVD (peripheral vascular disease) 2017    RLE BKA CT 16 Extensive atherosclerotic disease of the aorta and mesenteric arteries.     Renal hypertension     Type 2 diabetes mellitus with diabetic neuropathy, with long-term current use of insulin 2016    Vitamin B12 deficiency         Past Surgical History:   Procedure Laterality Date    av bovine graft      Left UE    AV FISTULA PLACEMENT      left UE    CARDIAC CATHETERIZATION  2015     KIDNEY TRANSPLANT  05/21/2016    LEG AMPUTATION THROUGH KNEE  2011    right LE, started as nail puncture leading to diabetic ulcer        Family History   Problem Relation Age of Onset    Cancer Father     Diabetes Father     Heart failure Father     Stroke Father     Heart failure Mother     Kidney disease Neg Hx         Social History     Social History    Marital status: Single     Spouse name: N/A    Number of children: N/A    Years of education: N/A     Occupational History    Disabled      Disabled     Social History Main Topics    Smoking status: Former Smoker     Packs/day: 1.00     Years: 40.00     Quit date: 1/11/2013    Smokeless tobacco: Never Used    Alcohol use 3.6 oz/week     6 Cans of beer per week      Comment: 6 beers/week    Drug use: No    Sexual activity: No     Other Topics Concern    Not on file     Social History Narrative    Lives alone. Retired from construction and various jobs, now retired. Would like to return to work PT to alleviate boredom.          Review of patient's allergies indicates:  No Known Allergies    Medications:      Current Facility-Administered Medications   Medication    0.9%  NaCl infusion    acetaminophen tablet 650 mg    albuterol-ipratropium 2.5 mg-0.5 mg/3 mL nebulizer solution 3 mL    amLODIPine tablet 2.5 mg    arformoterol nebulizer solution 15 mcg    aspirin EC tablet 81 mg    budesonide nebulizer solution 0.5 mg    dextrose 50% injection 12.5 g    dextrose 50% injection 25 g    gabapentin capsule 100 mg    glucagon (human recombinant) injection 1 mg    glucose chewable tablet 16 g    glucose chewable tablet 24 g    hydrALAZINE injection 10 mg    insulin aspart U-100 pen 0-5 Units    insulin detemir U-100 pen 5 Units    ondansetron disintegrating tablet 8 mg    oxyCODONE-acetaminophen 5-325 mg per tablet 2 tablet    piperacillin-tazobactam 4.5 g in dextrose 5 % 100 mL IVPB (ready to mix system)    predniSONE tablet 5 mg  "   sodium bicarbonate tablet 2,600 mg    tacrolimus capsule 1.5 mg    vancomycin (VANCOCIN) 1,250 mg in sodium chloride 0.9% 250 mL IVPB    zolpidem tablet 5 mg       No current facility-administered medications on file prior to encounter.      Current Outpatient Prescriptions on File Prior to Encounter   Medication Sig Dispense Refill    aspirin (ECOTRIN) 81 MG EC tablet Take 1 tablet (81 mg total) by mouth once daily.  0    BD INSULIN PEN NEEDLE UF SHORT 31 gauge x 5/16" Ndle       BD INSULIN SYRINGE ULTRA-FINE 1 mL 31 gauge x 5/16 Syrg USE ONE AS DIRECTED FOUR TIMES DAILY WITH MEALS AND AT BEDTIME 120 each 10    blood sugar diagnostic Strp 1 each by Misc.(Non-Drug; Combo Route) route 3 (three) times daily. 100 each 11    blood-glucose meter kit Use as instructed 1 each 0    budesonide-formoterol 160-4.5 mcg (SYMBICORT) 160-4.5 mcg/actuation HFAA Inhale 2 puffs into the lungs every 12 (twelve) hours. Wash out mouth after using 1 Inhaler 12    ergocalciferol (ERGOCALCIFEROL) 50,000 unit Cap Take 1 capsule (50,000 Units total) by mouth every 7 days. Take on Mondays 4 capsule 11    gabapentin (NEURONTIN) 300 MG capsule Take one tab bid and 2 at night. (Patient taking differently: 3 (three) times daily. Take one tab bid and 2 at night.) 120 capsule 11    inhalation device (BREATHERITE VALVED MDI CHAMBER) Use as directed for inhalation. 1 Device prn    insulin NPH (NOVOLIN N) 100 unit/mL injection Take 25 units subq with breakfast and 15 units at bedtime 4 vial 0    lancets Misc 1 each by Misc.(Non-Drug; Combo Route) route 3 (three) times daily. 100 each 11    NOVOLIN R REGULAR U-100 INSULN 100 unit/mL injection INJECT 6 UNITS WITH BREAKFAST AND 8 UNITS WITH DINNER, SUBCUTANEOUSLY 30 MIN BEFORE MEAL. 10 mL 1    ondansetron (ZOFRAN-ODT) 4 MG TbDL Take 1 tablet (4 mg total) by mouth every 8 (eight) hours as needed. 21 tablet 1    pen needle, diabetic (EASY COMFORT PEN NEEDLES) 32 gauge x 5/32" Ndle " "Inject 1 each into the skin 3 (three) times daily. 100 each 11    pen needle, diabetic 31 gauge x 1/4" Ndle 1 each by Misc.(Non-Drug; Combo Route) route 4 (four) times daily. 100 each 0    predniSONE (DELTASONE) 5 MG tablet Take 1 tablet (5 mg total) by mouth once daily. 30 tablet 11    sodium bicarbonate 650 MG tablet Take 4 tablets (2,600 mg total) by mouth 2 (two) times daily. 240 tablet 11    tacrolimus (PROGRAF) 0.5 MG Cap Take 3 capsules (1.5 mg total) by mouth every 12 (twelve) hours. Z94.0 Kidney Transplant 180 capsule 11    zolpidem (AMBIEN) 5 MG Tab Take 2 tablets (10 mg total) by mouth nightly as needed. 30 tablet 1       INTAKE & OUTPUT (Last 24H):    Intake/Output Summary (Last 24 hours) at 06/13/18 1516  Last data filed at 06/13/18 0644   Gross per 24 hour   Intake              690 ml   Output              350 ml   Net              340 ml       REVIEW OF SYSTEMS:           Patient has no current complaints of fever, chills, sweat, chest pain, shortness of breath, nausea, vomiting, or diarrhea.     PHYSICAL EXAM:      Vital Signs (Most Recent)  Temp: 98.2 °F (36.8 °C) (06/13/18 1125)  Pulse: 75 (06/13/18 1126)  Resp: 18 (06/13/18 1125)  BP: (!) 176/93 (06/13/18 1126)  SpO2: 98 % (06/13/18 1125)    Vital Signs Range (Last 24H):  Temp:  [96 °F (35.6 °C)-98.3 °F (36.8 °C)]   Pulse:  [60-81]   Resp:  [16-19]   BP: (141-225)/()   SpO2:  [92 %-98 %]     Constitutional: Appears well-developed, well-nourished and in no acute distress.  Pt is oriented to person, place, and time.     LOWER EXTREMITY PHYSICAL EXAM:    VASCULAR: Pulse absent DP/PT left.  Capillary fill time is between 3 and 5 seconds left.    DERMATOLOGIC: Lower extremities:    Thin, dry, atrophic left. Digital hair absent left. Digits cool with proximal foot warmth left.  Wound: left distal hallux 2x1cm and dorsal hipj 1x0.8cm area of dry gangrene.  Drainage: dry  Malodor: None  Erythema: Mild local periwound, dependent rubor to left " digits  Edema: Mild local periwound  Varicosities: None  Pigmentary changes: None  Interdigital maceration: None  Skin lesions: None  Nails are dystrophic and discolored left.    NEURO: Plantar protective threshold sensation by touch diminished to the left.    MUSCULOSKELETAL Lower extremities:    Deformities: right bka    Normal arch height and rectus left foot.    5/5 muscle strength and tone in all four quadrants left.    Pain-free range of motion in all four quadrants with stiffness and limitation left.     Pain: None    Laboratory Data:  Reviewed and noted in plan where applicable. Please see chart for full laboratory data.      Recent Labs  Lab 06/13/18  1151   POCTGLUCOSE 210*        Lab Results   Component Value Date    INR 1.0 06/15/2017    INR 1.0 05/08/2017    INR 1.0 04/23/2017       Lab Results   Component Value Date    WBC 4.35 06/13/2018    HGB 15.5 06/13/2018    HCT 46.3 06/13/2018    MCV 94 06/13/2018     06/13/2018       BMP    Recent Labs  Lab 06/13/18  0457   *  211*   *  135*   K 4.1  4.1     104   CO2 23  23   BUN 24*  24*   CREATININE 1.8*  1.8*   CALCIUM 9.3  9.2   MG 1.9       Lab Results   Component Value Date    HGBA1C 9.9 (H) 06/12/2018       Diagnostic Results:    Imaging Results          X-Ray Toe 2 or More Views Left (Final result)  Result time 06/12/18 08:43:42    Final result by Jose David Araiza MD (06/12/18 08:43:42)                 Impression:      Stable exam with small focal soft tissue defect of the distal left 1st toe.  No evidence of osteomyelitis by plain film at this time.  Chronic findings as described above.      Electronically signed by: Jose David Araiza MD  Date:    06/12/2018  Time:    08:43             Narrative:    EXAMINATION:  XR TOE 2 OR MORE VIEWS LEFT    CLINICAL HISTORY:  left first toe wound;    TECHNIQUE:  Standard radiography performed.    COMPARISON:  Plain x-ray 06/01/2018    FINDINGS:  There is a focal defect of the  soft tissues of the superior aspect of the left 1st toe along the distal margin.  Similar to the previous study.    No bone erosion or destruction.  No acute fracture.    Vascular calcification.  Old fracture deformity versus paratracheal a calcification adjacent to the 2nd metacarpophalangeal joint, similar to the previous study.                                Microbiology Results (last 7 days)     Procedure Component Value Units Date/Time    Aerobic culture [[id number]] Collected:  18 0912    Order Status:  Completed Specimen:  Wound from Toe, Left Foot Updated:  18 1128     Aerobic Bacterial Culture No significant isolate to date    Blood Culture #1 **CANNOT BE ORDERED STAT** [[id number]] Collected:  18 0901    Order Status:  Completed Specimen:  Blood from Peripheral, Hand, Left Updated:  18 2145     Blood Culture, Routine No Growth to date    Blood Culture #2 **CANNOT BE ORDERED STAT** [[id number]] Collected:  18 0908    Order Status:  Completed Specimen:  Blood from Peripheral, Wrist, Right Updated:  18 2145     Blood Culture, Routine No Growth to date          ASSESSMENT/PLAN:     Active Hospital Problems    Diagnosis  POA    *Open wound of left great toe [S91.102A]  Yes    PVD (peripheral vascular disease) [I73.9]  Yes     Chronic     RLE BKA  CT 16 Extensive atherosclerotic disease of the aorta and mesenteric arteries.        Type 2 diabetes mellitus with diabetic neuropathy, with long-term current use of insulin [E11.40, Z79.4]  Not Applicable    Chronic obstructive pulmonary disease [J44.9]  Yes     donor kidney transplant for DM 16 [Z94.0]  Not Applicable     Chronic     Induction with Thymo x3 and IV solumedrol to total 875mg    Kidney Biopsy   2016: 16 glomeruli, ACR type 1 AVR type 2, significant microcirculatory changes, c4d negative, No DSA, 5 to10% fibrosis. Treated with thymo x8 (5 full, 3 half doses)  2016: insufficient sample  since it had no glomeruli; C4d was negative in peritubular capillaries but insufficient tissue to comment on presence of rejection  8/15/16: 25 glomeruli, moderate microcirculatory inflammation, positive C4d (20-30%), interstitial infiltrate with tubulitis but <5% thus not meeting diagnostic criteria for ACR but suspicious for ACR, no AVR, CNI toxicity, ~10% interstitial fibrosis, 7 foci of calcium phos crystals. Rx SM pulsex3 in BR -21  16 Endothelial cell crossmatch against target cells EC1 and EC2 negative, no DILIA antibodies detected. AT1R also negative. At present time no change in management. (resulted )        Long-term use of immunosuppressant medication [Z79.899]  Not Applicable      Resolved Hospital Problems    Diagnosis Date Resolved POA   No resolved problems to display.       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     TREATMENT/PLAN: Treatment today consisted of the followin. The patient was counseled regarding findings of my examination, my impressions, treatment options, results of diagnostic tests and usual treatment plan.  MRI results were reviewed with the patient indicating that there were no signal changes consistent with osteomyelitis or bone infection.  Given patient is high risk and immunocompromise we discussed that there is the possibility of continuing with IV antibiotics short-term.  We will discuss this further with hospital medicine.  He does have an appointment with the immunologist in [place] that he will follow-up with in the near future.  2. Ulcer was painted with Betadine with nursing orders to continue.  Nothing to culture at this time.  3. Patient to continue antibiotics per hospital medicine or ID to be adjusted according to culture results.  4.  He is scheduled for angiogram tomorrow and will follow-up with results to decide how to proceed next, but for the time being he will continue with Betadine  painting.  5. Will follow up bedside.     Thank you for allowing us to participate in the care of this patent.

## 2018-06-13 NOTE — PLAN OF CARE
CM assessment complete. Pt stated that he lives alone. He uses a prosthetic leg, a straight cane, standard walker and a wheelchair. Denies falls or other injuries. Denies financial needs and is able to obtain home medication without difficulty.   Pt's plan after admission is unknown at this time whether he will need HH or a      06/13/18 1319   Discharge Assessment   Assessment Type Discharge Planning Assessment   Confirmed/corrected address and phone number on facesheet? Yes   Assessment information obtained from? Patient   Expected Length of Stay (days) (Unknown at this time)   Communicated expected length of stay with patient/caregiver yes   Prior to hospitilization cognitive status: Alert/Oriented   Prior to hospitalization functional status: Assistive Equipment   Current cognitive status: Alert/Oriented   Current Functional Status: Assistive Equipment   Facility Arrived From: (Home)   Lives With alone   Able to Return to Prior Arrangements yes   Is patient able to care for self after discharge? Unable to determine at this time (comments)   Who are your caregiver(s) and their phone number(s)? (Kecia Sagastume (Sister) [phone])   Patient's perception of discharge disposition home or selfcare   Readmission Within The Last 30 Days no previous admission in last 30 days   Patient currently being followed by outpatient case management? No   Patient currently receives home health services? No   Patient currently receives any other outside agency services? No   Equipment Currently Used at Home cane, straight;wheelchair;walker, standard   Do you have any problems affording any of your prescribed medications? No   Is the patient taking medications as prescribed? yes   Does the patient have transportation home? Yes   Transportation Available family or friend will provide   Dialysis Name and Scheduled days (n/a)   Does the patient receive services at the [facility]? No   Discharge Plan A Home Health   Discharge  Plan B Skilled Nursing Facility   Patient/Family In Agreement With Plan yes   Skilled facility. Discharge planning pamphlet, Advance directive and [facility] bedside pharmacy information discussed and placed in [facility]. CM name and number written on white board. Payor: [employer].

## 2018-06-13 NOTE — PLAN OF CARE
Problem: Patient Care Overview  Goal: Plan of Care Review  Outcome: Ongoing (interventions implemented as appropriate)  POC discussed w/patient, verbalized understanding.   NSR on monitor. AAO X 4. VSS.   Voids per urinal at bedside.   IV intact. Medications administered as prescribed.   Patient prothesis at bedside.   Patient turns independently in bed.   Fall precautions in place, call bell in reach, bed in low and locked position.  Patient remains free from falls/injuries.   Patient denies needs at this time.   Will continue to monitor.

## 2018-06-13 NOTE — PLAN OF CARE
Problem: Patient Care Overview  Goal: Plan of Care Review  Outcome: Ongoing (interventions implemented as appropriate)  Plan of care discussed with patient, and patient verbalized understanding.  Patient remained AOx4, room air, and NSR on the monitor. Medication adjusted to address pain.  Patient remained free of falls, accidents, and trama during the day shift.  Bed is in the lowest position and call light is within reach - Continue to monitor.

## 2018-06-13 NOTE — SUBJECTIVE & OBJECTIVE
Review of Systems   Constitutional: Negative for activity change and appetite change.   HENT: Negative for congestion and facial swelling.    Eyes: Negative for pain, discharge and redness.   Respiratory: Negative for apnea, cough and chest tightness.    Cardiovascular: Negative for chest pain, palpitations and leg swelling.   Gastrointestinal: Negative for abdominal distention.   Genitourinary: Negative for difficulty urinating, dysuria and frequency.   Musculoskeletal: Negative for neck pain and neck stiffness.   Skin: Positive for wound. Negative for color change and rash.        Dry gangrene on left great toe , purulent collection noted    Neurological: Negative for dizziness, weakness and numbness.   Psychiatric/Behavioral: Negative for sleep disturbance.   All other systems reviewed and are negative.    Objective:     Vital Signs (Most Recent):  Temp: 98.2 °F (36.8 °C) (06/13/18 1125)  Pulse: 75 (06/13/18 1126)  Resp: 18 (06/13/18 1125)  BP: (!) 176/93 (06/13/18 1126)  SpO2: 98 % (06/13/18 1125) Vital Signs (24h Range):  Temp:  [96 °F (35.6 °C)-98.3 °F (36.8 °C)] 98.2 °F (36.8 °C)  Pulse:  [60-81] 75  Resp:  [16-19] 18  SpO2:  [92 %-98 %] 98 %  BP: (141-225)/() 176/93     Weight: 98.4 kg (217 lb)  Body mass index is 30.27 kg/m².    Intake/Output Summary (Last 24 hours) at 06/13/18 1529  Last data filed at 06/13/18 0644   Gross per 24 hour   Intake              690 ml   Output              350 ml   Net              340 ml      Physical Exam   Constitutional: He is oriented to person, place, and time. He appears well-developed and well-nourished. No distress.   HENT:   Head: Normocephalic and atraumatic.   Eyes: Pupils are equal, round, and reactive to light. Right eye exhibits no discharge.   Neck: Normal range of motion. Neck supple. No tracheal deviation present. No thyromegaly present.   Cardiovascular: Normal rate, regular rhythm, normal heart sounds and intact distal pulses.  Exam reveals no  gallop and no friction rub.    No murmur heard.  Pulmonary/Chest: Effort normal and breath sounds normal. He has no wheezes. He has no rales.   Abdominal: Soft. He exhibits no mass. There is no tenderness. There is no rebound and no guarding.   No allograft tenderness    Musculoskeletal: Normal range of motion. He exhibits no edema.   Left toe gangrene , s/p right BKA    Lymphadenopathy:     He has no cervical adenopathy.   Neurological: He is alert and oriented to person, place, and time.   Skin: Skin is warm. No rash noted. He is not diaphoretic. No erythema.   Nursing note and vitals reviewed.      Significant Labs: All pertinent labs within the past 24 hours have been reviewed.    Significant Imaging: I have reviewed all pertinent imaging results/findings within the past 24 hours.

## 2018-06-13 NOTE — CONSULTS
"[facility] -   Vascular Surgery  Consult Note    Consults  Subjective:     Chief Complaint/Reason for Admission: left foot ulceration    History of Present Illness: 62 y.o. male who presents to the ER for evaluation and treatment of high risk feet. Lavelle has a past medical history of DINORAH (acute kidney injury) (2016); Arthritis; Chronic obstructive pulmonary disease (2016); Coronary artery disease involving native coronary artery of native heart without angina pectoris (2016);  donor kidney transplant for DM 16; Diastolic heart failure; Encounter for blood transfusion; ESRD on RRT since 10/2013 (10/29/2013); GERD (gastroesophageal reflux disease); History of hepatitis C, s/p successful Rx w/ SVR12 - 2017 (2017); Hyperlipidemia; Hypertension; Prophylactic immunotherapy; Proteinuria; PVD (peripheral vascular disease) (2017); Renal hypertension; Type 2 diabetes mellitus with diabetic neuropathy, with long-term current use of insulin (2016); and Vitamin B12 deficiency. The patient's chief complaint is left toe wound. Patient states he clipped toenail about 4 weeks ago and cut and the wound started after that. Patient states he was seen in the ER last week and they cleaned toe up and gave him antibiotics. Patient states he has been applying neosporin to the toe and finished the antibiotics on Saturday. This patient has documented high risk feet requiring routine maintenance secondary to peripheral vascular disease.    Pt has had right BKA performed by Dr. Ernst several years ago.    Prescriptions Prior to Admission   Medication Sig Dispense Refill Last Dose    aspirin (ECOTRIN) 81 MG EC tablet Take 1 tablet (81 mg total) by mouth once daily.  0 2018    BD INSULIN PEN NEEDLE UF SHORT 31 gauge x 5/16" Ndle    Taking    BD INSULIN SYRINGE ULTRA-FINE 1 mL 31 gauge x 5/16 Syrg USE ONE AS DIRECTED FOUR TIMES DAILY WITH MEALS AND AT BEDTIME 120 each 10     " "blood sugar diagnostic Strp 1 each by Misc.(Non-Drug; Combo Route) route 3 (three) times daily. 100 each 11 Taking    blood-glucose meter kit Use as instructed 1 each 0 Taking    budesonide-formoterol 160-4.5 mcg (SYMBICORT) 160-4.5 mcg/actuation HFAA Inhale 2 puffs into the lungs every 12 (twelve) hours. Wash out mouth after using 1 Inhaler 12 Taking    ergocalciferol (ERGOCALCIFEROL) 50,000 unit Cap Take 1 capsule (50,000 Units total) by mouth every 7 days. Take on Mondays 4 capsule 11 6/11/2018    gabapentin (NEURONTIN) 300 MG capsule Take one tab bid and 2 at night. (Patient taking differently: 3 (three) times daily. Take one tab bid and 2 at night.) 120 capsule 11 6/12/2018    inhalation device (BREATHERITE VALVED MDI CHAMBER) Use as directed for inhalation. 1 Device prn Taking    insulin NPH (NOVOLIN N) 100 unit/mL injection Take 25 units subq with breakfast and 15 units at bedtime 4 vial 0 6/12/2018    lancets Misc 1 each by Misc.(Non-Drug; Combo Route) route 3 (three) times daily. 100 each 11 Taking    NOVOLIN R REGULAR U-100 INSULN 100 unit/mL injection INJECT 6 UNITS WITH BREAKFAST AND 8 UNITS WITH DINNER, SUBCUTANEOUSLY 30 MIN BEFORE MEAL. 10 mL 1 6/12/2018    ondansetron (ZOFRAN-ODT) 4 MG TbDL Take 1 tablet (4 mg total) by mouth every 8 (eight) hours as needed. 21 tablet 1 Not Taking    pen needle, diabetic (EASY COMFORT PEN NEEDLES) 32 gauge x 5/32" Ndle Inject 1 each into the skin 3 (three) times daily. 100 each 11 Taking    pen needle, diabetic 31 gauge x 1/4" Ndle 1 each by Misc.(Non-Drug; Combo Route) route 4 (four) times daily. 100 each 0 Taking    predniSONE (DELTASONE) 5 MG tablet Take 1 tablet (5 mg total) by mouth once daily. 30 tablet 11 6/12/2018    sodium bicarbonate 650 MG tablet Take 4 tablets (2,600 mg total) by mouth 2 (two) times daily. 240 tablet 11 6/12/2018    tacrolimus (PROGRAF) 0.5 MG Cap Take 3 capsules (1.5 mg total) by mouth every 12 (twelve) hours. Z94.0 Kidney " Transplant 180 capsule 11 2018    zolpidem (AMBIEN) 5 MG Tab Take 2 tablets (10 mg total) by mouth nightly as needed. 30 tablet 1 Taking       Review of patient's allergies indicates:  No Known Allergies    Past Medical History:   Diagnosis Date    DINORAH (acute kidney injury) 2016    Arthritis     Chronic obstructive pulmonary disease 2016    Coronary artery disease involving native coronary artery of native heart without angina pectoris 2016     donor kidney transplant for DM 16     Induction with Thymo x3 and IV solumedrol to total 875mg  Kidney Biopsy  2016: 16 glomeruli, ACR type 1 AVR type 2, significant microcirculatory changes, c4d negative, No DSA, 5 to10% fibrosis. Treated with thymo x8 2016- no rejection      Diastolic heart failure     Encounter for blood transfusion     ESRD on RRT since 10/2013 10/29/2013    Biopsy proven diabetic nephropathy and lymphoplasmacytic interstitial infiltrate not c/w with AIN (ddx sjogrens or assoc with tamm-horsefall protein extravasation)     GERD (gastroesophageal reflux disease)     History of hepatitis C, s/p successful Rx w/ SVR12 - 2017    Completed 12 weeks harvoni w/ SVR    Hyperlipidemia     Hypertension     Prophylactic immunotherapy     Proteinuria     PVD (peripheral vascular disease) 2017    RLE BKA CT 16 Extensive atherosclerotic disease of the aorta and mesenteric arteries.     Renal hypertension     Type 2 diabetes mellitus with diabetic neuropathy, with long-term current use of insulin 2016    Vitamin B12 deficiency      Past Surgical History:   Procedure Laterality Date    av bovine graft      Left UE    AV FISTULA PLACEMENT      left UE    CARDIAC CATHETERIZATION  2015    KIDNEY TRANSPLANT  2016    LEG AMPUTATION THROUGH KNEE      right LE, started as nail puncture leading to diabetic ulcer     Family History     Problem Relation (Age of Onset)     Cancer Father    Diabetes Father    Heart failure Father, Mother    Stroke Father        Social History Main Topics    Smoking status: Former Smoker     Packs/day: 1.00     Years: 40.00     Quit date: 1/11/2013    Smokeless tobacco: Never Used    Alcohol use 3.6 oz/week     6 Cans of beer per week      Comment: 6 beers/week    Drug use: No    Sexual activity: No     Review of Systems   Negative except for above    Objective:     Vital Signs (Most Recent):  Temp: 98.2 °F (36.8 °C) (06/13/18 1125)  Pulse: 75 (06/13/18 1126)  Resp: 18 (06/13/18 1125)  BP: (!) 176/93 (06/13/18 1126)  SpO2: 98 % (06/13/18 1125) Vital Signs (24h Range):  Temp:  [96 °F (35.6 °C)-98.3 °F (36.8 °C)] 98.2 °F (36.8 °C)  Pulse:  [60-81] 75  Resp:  [16-19] 18  SpO2:  [92 %-98 %] 98 %  BP: (141-225)/() 176/93     Weight: 98.4 kg (217 lb)  Body mass index is 30.27 kg/m².         Physical Exam   Constitutional: He is oriented to person, place, and time. He appears well-developed and well-nourished. No distress.   HENT:   Head: Normocephalic and atraumatic.   Eyes: Pupils are equal, round, and reactive to light. Right eye exhibits no discharge.   Neck: Normal range of motion. Neck supple. No tracheal deviation present. No thyromegaly present.   Cardiovascular: Normal rate, regular rhythm, normal heart sounds and intact distal pulses.  Exam reveals no gallop and no friction rub.    No murmur heard.  Pulmonary/Chest: Effort normal and breath sounds normal. He has no wheezes. He has no rales.   Abdominal: Soft. He exhibits no mass. There is no tenderness. There is no rebound and no guarding.   No allograft tenderness    Musculoskeletal: Normal range of motion. He exhibits no edema.   Left toe gangrene , s/p right BKA    Lymphadenopathy:     He has no cervical adenopathy.   Neurological: He is alert and oriented to person, place, and time.   Skin: Skin is warm. No rash noted. He is not diaphoretic. No erythema.   Nursing note and vitals  reviewed.      Significant Labs:  BMP:   Recent Labs  Lab 18  0457   *  211*   *  135*   K 4.1  4.1     104   CO2 23  23   BUN 24*  24*   CREATININE 1.8*  1.8*   CALCIUM 9.3  9.2   MG 1.9     CBC:   Recent Labs  Lab 18  0457   WBC 4.35   RBC 4.94   HGB 15.5   HCT 46.3      MCV 94   MCH 31.4*   MCHC 33.5     Coagulation: No results for input(s): LABPROT, INR, APTT in the last 48 hours.    Significant Diagnostics:  I have reviewed all pertinent imaging results/findings within the past 24 hours.     Left leg arterial duplex:  Probable 50% stenosis distal left SFA.  Monophasic waveform within the anterior tibial, posterior tibial and peroneal arteries of the left calf.    Assessment/Plan:     Active Diagnoses:    Diagnosis Date Noted POA    PRINCIPAL PROBLEM:  Open wound of left great toe [S91.102A] 2018 Yes    PVD (peripheral vascular disease) [I73.9] 2017 Yes     Chronic    Type 2 diabetes mellitus with diabetic neuropathy, with long-term current use of insulin [E11.40, Z79.4] 2016 Not Applicable    Chronic obstructive pulmonary disease [J44.9] 2016 Yes     donor kidney transplant for DM 16 [Z94.0]  Not Applicable     Chronic    Long-term use of immunosuppressant medication [Z79.899]  Not Applicable      Problems Resolved During this Admission:    Diagnosis Date Noted Date Resolved POA     Will proceed with LLE angiogram with CO2 and minimal contrast tomorrow  NPO after midnight  Discussed with patient and Renal    Thank you for your consult. I will follow-up with patient. Please contact us if you have any additional questions.    Horacio Uribe IV, MD  Vascular Surgery  [facility] -

## 2018-06-13 NOTE — NURSING
Patient transported back to room from MRI by tech.  Patient in no distress. Cardiac monitor replaced. Patient denies needs at this time.

## 2018-06-13 NOTE — PROGRESS NOTES
[facility] -   Nephrology  Progress Note    Patient Name: Lavelle Ladd  MRN: [id number]  Admission Date: 2018  Hospital Length of Stay: 1 days  Attending Provider: Stephanie Mueller MD   Primary Care Physician: Rishabh Esteban MD  Principal Problem:Open wound of left great toe    Subjective:     HPI: Lavelle Ladd is a pleasant 62 y old  man  who received a  - brain death kidney transplant on 16.  He has CKD stage 3 - GFR 30-59 and his baseline creatinine is between 1.6-1.8. He takes  prednisone and tacrolimus for maintenance immunosuppression.  MMF was stopped few months ago , s/p right BKA , has dry gangrene on his left great toe for few days, now developed purulent discharge, admitted for the same , Podiatry consulted , renal fn at his baseline ,        Past Medical History:   Diagnosis Date    DINORAH (acute kidney injury) 2016    Arthritis     Chronic obstructive pulmonary disease 2016    Coronary artery disease involving native coronary artery of native heart without angina pectoris 2016     donor kidney transplant for DM 16     Induction with Thymo x3 and IV solumedrol to total 875mg  Kidney Biopsy  2016: 16 glomeruli, ACR type 1 AVR type 2, significant microcirculatory changes, c4d negative, No DSA, 5 to10% fibrosis. Treated with thymo x8 2016- no rejection      Diastolic heart failure     Encounter for blood transfusion     ESRD on RRT since 10/2013 10/29/2013    Biopsy proven diabetic nephropathy and lymphoplasmacytic interstitial infiltrate not c/w with AIN (ddx sjogrens or assoc with tamm-horsefall protein extravasation)     GERD (gastroesophageal reflux disease)     History of hepatitis C, s/p successful Rx w/ SVR12 - 2017    Completed 12 weeks harvoni w/ SVR    Hyperlipidemia     Hypertension     Prophylactic immunotherapy     Proteinuria     PVD (peripheral vascular disease) 2017    RLE BKA  CT 12/11/16 Extensive atherosclerotic disease of the aorta and mesenteric arteries.     Renal hypertension     Type 2 diabetes mellitus with diabetic neuropathy, with long-term current use of insulin 12/1/2016    Vitamin B12 deficiency        Past Surgical History:   Procedure Laterality Date    av bovine graft      Left UE    AV FISTULA PLACEMENT      left UE    CARDIAC CATHETERIZATION  02/2015    KIDNEY TRANSPLANT  05/21/2016    LEG AMPUTATION THROUGH KNEE  2011    right LE, started as nail puncture leading to diabetic ulcer       Review of patient's allergies indicates:  No Known Allergies  Current Facility-Administered Medications   Medication Frequency    acetaminophen tablet 650 mg Q6H PRN    albuterol-ipratropium 2.5 mg-0.5 mg/3 mL nebulizer solution 3 mL Q6H PRN    amLODIPine tablet 2.5 mg Daily    arformoterol nebulizer solution 15 mcg Q12H    aspirin EC tablet 81 mg Daily    budesonide nebulizer solution 0.5 mg Q12H    dextrose 50% injection 12.5 g PRN    dextrose 50% injection 25 g PRN    gabapentin capsule 100 mg TID    glucagon (human recombinant) injection 1 mg PRN    glucose chewable tablet 16 g PRN    glucose chewable tablet 24 g PRN    hydrALAZINE injection 10 mg Q6H PRN    insulin aspart U-100 pen 0-5 Units QID (AC + HS) PRN    insulin detemir U-100 pen 5 Units QHS    ondansetron disintegrating tablet 8 mg Q8H PRN    oxyCODONE-acetaminophen 5-325 mg per tablet 2 tablet Q6H PRN    piperacillin-tazobactam 4.5 g in dextrose 5 % 100 mL IVPB (ready to mix system) Q8H    predniSONE tablet 5 mg Daily    sodium bicarbonate tablet 2,600 mg BID    tacrolimus capsule 1.5 mg BID    vancomycin (VANCOCIN) 1,250 mg in sodium chloride 0.9% 250 mL IVPB Q18H    zolpidem tablet 5 mg Nightly PRN     Family History     Problem Relation (Age of Onset)    Cancer Father    Diabetes Father    Heart failure Father, Mother    Stroke Father        Social History Main Topics    Smoking status:  Former Smoker     Packs/day: 1.00     Years: 40.00     Quit date: 1/11/2013    Smokeless tobacco: Never Used    Alcohol use 3.6 oz/week     6 Cans of beer per week      Comment: 6 beers/week    Drug use: No    Sexual activity: No     Review of Systems   Constitutional: Negative for activity change and appetite change.   HENT: Negative for congestion and facial swelling.    Eyes: Negative for pain, discharge and redness.   Respiratory: Negative for apnea, cough and chest tightness.    Cardiovascular: Negative for chest pain, palpitations and leg swelling.   Gastrointestinal: Negative for abdominal distention.   Genitourinary: Negative for difficulty urinating, dysuria and frequency.   Musculoskeletal: Negative for neck pain and neck stiffness.   Skin: Positive for wound. Negative for color change and rash.        Dry gangrene on left great toe , purulent collection noted    Neurological: Negative for dizziness, weakness and numbness.   Psychiatric/Behavioral: Negative for sleep disturbance.   All other systems reviewed and are negative.    Objective:     Vital Signs (Most Recent):  Temp: 98.2 °F (36.8 °C) (06/13/18 1125)  Pulse: 75 (06/13/18 1126)  Resp: 18 (06/13/18 1125)  BP: (!) 176/93 (06/13/18 1126)  SpO2: 98 % (06/13/18 1125)  O2 Device (Oxygen Therapy): room air (06/13/18 0900) Vital Signs (24h Range):  Temp:  [96 °F (35.6 °C)-98.3 °F (36.8 °C)] 98.2 °F (36.8 °C)  Pulse:  [60-81] 75  Resp:  [16-19] 18  SpO2:  [92 %-98 %] 98 %  BP: (141-225)/() 176/93     Weight: 98.4 kg (217 lb) (06/12/18 0814)  Body mass index is 30.27 kg/m².  Body surface area is 2.22 meters squared.    I/O last 3 completed shifts:  In: 930 [P.O.:480; IV Piggyback:450]  Out: 650 [Urine:650]    Physical Exam   Constitutional: He is oriented to person, place, and time. He appears well-developed and well-nourished. No distress.   HENT:   Head: Normocephalic and atraumatic.   Eyes: Pupils are equal, round, and reactive to light. Right  eye exhibits no discharge.   Neck: Normal range of motion. Neck supple. No tracheal deviation present. No thyromegaly present.   Cardiovascular: Normal rate, regular rhythm, normal heart sounds and intact distal pulses.  Exam reveals no gallop and no friction rub.    No murmur heard.  Pulmonary/Chest: Effort normal and breath sounds normal. He has no wheezes. He has no rales.   Abdominal: Soft. He exhibits no mass. There is no tenderness. There is no rebound and no guarding.   No allograft tenderness    Musculoskeletal: Normal range of motion. He exhibits no edema.   Left toe gangrene , s/p right BKA    Lymphadenopathy:     He has no cervical adenopathy.   Neurological: He is alert and oriented to person, place, and time.   Skin: Skin is warm. No rash noted. He is not diaphoretic. No erythema.   Nursing note and vitals reviewed.      Significant Labs:  CBC:   Recent Labs  Lab 18   WBC 4.35   RBC 4.94   HGB 15.5   HCT 46.3      MCV 94   MCH 31.4*   MCHC 33.5     CMP:   Recent Labs  Lab 18  0901 18   * 212*  211*   CALCIUM 9.4 9.3  9.2   ALBUMIN 3.5 3.3*   PROT 7.0  --     135*  135*   K 4.4 4.1  4.1   CO2 23 23  23    104  104   BUN 23 24*  24*   CREATININE 1.7* 1.8*  1.8*   ALKPHOS 73  --    ALT 10  --    AST 17  --    BILITOT 0.4  --      Coagulation: No results for input(s): PT, INR, APTT in the last 168 hours.  LFTs:   Recent Labs  Lab 18  0901 18  0457   ALT 10  --    AST 17  --    ALKPHOS 73  --    BILITOT 0.4  --    PROT 7.0  --    ALBUMIN 3.5 3.3*     All labs within the past 24 hours have been reviewed.    Significant Imaging: reviewed     Assessment/Plan:      donor kidney transplant for DM 16    1. S/p DDRT in 2016 : stable allograft fn , cont Prograf 1.5 mg bid and Pred 5 mg daily ,    2. HTN : resume home BP meds, follow , add Aml    3. Left toe gangrene,  on broad spectrum abx , reviewed Vascular and Podiatry notes,  awaiting angiogram, discussed with vascular, will start gently hydration ,     4. Metabolic acidosis : on  Bicarb supplements     5. Anemia of CKD : stable Hb              I will follow-up with patient. Please contact us if you have any additional questions.     Total time spent 40 minutes including time needed to review the records,  patient  evaluation, documentation, face-to-face discussion with the patient,  primary and vascular , more than 50% of the time was spent on coordination of care and counseling.       Carlos Staley MD  Nephrology  [facility] [place]

## 2018-06-13 NOTE — SUBJECTIVE & OBJECTIVE
Past Medical History:   Diagnosis Date    DINORAH (acute kidney injury) 2016    Arthritis     Chronic obstructive pulmonary disease 2016    Coronary artery disease involving native coronary artery of native heart without angina pectoris 2016     donor kidney transplant for DM 16     Induction with Thymo x3 and IV solumedrol to total 875mg  Kidney Biopsy  2016: 16 glomeruli, ACR type 1 AVR type 2, significant microcirculatory changes, c4d negative, No DSA, 5 to10% fibrosis. Treated with thymo x8 2016- no rejection      Diastolic heart failure     Encounter for blood transfusion     ESRD on RRT since 10/2013 10/29/2013    Biopsy proven diabetic nephropathy and lymphoplasmacytic interstitial infiltrate not c/w with AIN (ddx sjogrens or assoc with tamm-horsefall protein extravasation)     GERD (gastroesophageal reflux disease)     History of hepatitis C, s/p successful Rx w/ SVR12 - 2017    Completed 12 weeks harvoni w/ SVR    Hyperlipidemia     Hypertension     Prophylactic immunotherapy     Proteinuria     PVD (peripheral vascular disease) 2017    RLE BKA CT 16 Extensive atherosclerotic disease of the aorta and mesenteric arteries.     Renal hypertension     Type 2 diabetes mellitus with diabetic neuropathy, with long-term current use of insulin 2016    Vitamin B12 deficiency        Past Surgical History:   Procedure Laterality Date    av bovine graft      Left UE    AV FISTULA PLACEMENT      left UE    CARDIAC CATHETERIZATION  2015    KIDNEY TRANSPLANT  2016    LEG AMPUTATION THROUGH KNEE  2011    right LE, started as nail puncture leading to diabetic ulcer       Review of patient's allergies indicates:  No Known Allergies  Current Facility-Administered Medications   Medication Frequency    acetaminophen tablet 650 mg Q6H PRN    albuterol-ipratropium 2.5 mg-0.5 mg/3 mL nebulizer solution 3 mL Q6H PRN    amLODIPine tablet  2.5 mg Daily    arformoterol nebulizer solution 15 mcg Q12H    aspirin EC tablet 81 mg Daily    budesonide nebulizer solution 0.5 mg Q12H    dextrose 50% injection 12.5 g PRN    dextrose 50% injection 25 g PRN    gabapentin capsule 100 mg TID    glucagon (human recombinant) injection 1 mg PRN    glucose chewable tablet 16 g PRN    glucose chewable tablet 24 g PRN    hydrALAZINE injection 10 mg Q6H PRN    insulin aspart U-100 pen 0-5 Units QID (AC + HS) PRN    insulin detemir U-100 pen 5 Units QHS    ondansetron disintegrating tablet 8 mg Q8H PRN    oxyCODONE-acetaminophen 5-325 mg per tablet 2 tablet Q6H PRN    piperacillin-tazobactam 4.5 g in dextrose 5 % 100 mL IVPB (ready to mix system) Q8H    predniSONE tablet 5 mg Daily    sodium bicarbonate tablet 2,600 mg BID    tacrolimus capsule 1.5 mg BID    vancomycin (VANCOCIN) 1,250 mg in sodium chloride 0.9% 250 mL IVPB Q18H    zolpidem tablet 5 mg Nightly PRN     Family History     Problem Relation (Age of Onset)    Cancer Father    Diabetes Father    Heart failure Father, Mother    Stroke Father        Social History Main Topics    Smoking status: Former Smoker     Packs/day: 1.00     Years: 40.00     Quit date: 1/11/2013    Smokeless tobacco: Never Used    Alcohol use 3.6 oz/week     6 Cans of beer per week      Comment: 6 beers/week    Drug use: No    Sexual activity: No     Review of Systems   Constitutional: Negative for activity change and appetite change.   HENT: Negative for congestion and facial swelling.    Eyes: Negative for pain, discharge and redness.   Respiratory: Negative for apnea, cough and chest tightness.    Cardiovascular: Negative for chest pain, palpitations and leg swelling.   Gastrointestinal: Negative for abdominal distention.   Genitourinary: Negative for difficulty urinating, dysuria and frequency.   Musculoskeletal: Negative for neck pain and neck stiffness.   Skin: Positive for wound. Negative for color change and  rash.        Dry gangrene on left great toe , purulent collection noted    Neurological: Negative for dizziness, weakness and numbness.   Psychiatric/Behavioral: Negative for sleep disturbance.   All other systems reviewed and are negative.    Objective:     Vital Signs (Most Recent):  Temp: 98.2 °F (36.8 °C) (06/13/18 1125)  Pulse: 75 (06/13/18 1126)  Resp: 18 (06/13/18 1125)  BP: (!) 176/93 (06/13/18 1126)  SpO2: 98 % (06/13/18 1125)  O2 Device (Oxygen Therapy): room air (06/13/18 0900) Vital Signs (24h Range):  Temp:  [96 °F (35.6 °C)-98.3 °F (36.8 °C)] 98.2 °F (36.8 °C)  Pulse:  [60-81] 75  Resp:  [16-19] 18  SpO2:  [92 %-98 %] 98 %  BP: (141-225)/() 176/93     Weight: 98.4 kg (217 lb) (06/12/18 0814)  Body mass index is 30.27 kg/m².  Body surface area is 2.22 meters squared.    I/O last 3 completed shifts:  In: 930 [P.O.:480; IV Piggyback:450]  Out: 650 [Urine:650]    Physical Exam   Constitutional: He is oriented to person, place, and time. He appears well-developed and well-nourished. No distress.   HENT:   Head: Normocephalic and atraumatic.   Eyes: Pupils are equal, round, and reactive to light. Right eye exhibits no discharge.   Neck: Normal range of motion. Neck supple. No tracheal deviation present. No thyromegaly present.   Cardiovascular: Normal rate, regular rhythm, normal heart sounds and intact distal pulses.  Exam reveals no gallop and no friction rub.    No murmur heard.  Pulmonary/Chest: Effort normal and breath sounds normal. He has no wheezes. He has no rales.   Abdominal: Soft. He exhibits no mass. There is no tenderness. There is no rebound and no guarding.   No allograft tenderness    Musculoskeletal: Normal range of motion. He exhibits no edema.   Left toe gangrene , s/p right BKA    Lymphadenopathy:     He has no cervical adenopathy.   Neurological: He is alert and oriented to person, place, and time.   Skin: Skin is warm. No rash noted. He is not diaphoretic. No erythema.   Nursing  note and vitals reviewed.      Significant Labs:  CBC:   Recent Labs  Lab 06/13/18 0457   WBC 4.35   RBC 4.94   HGB 15.5   HCT 46.3      MCV 94   MCH 31.4*   MCHC 33.5     CMP:   Recent Labs  Lab 06/12/18  0901 06/13/18 0457   * 212*  211*   CALCIUM 9.4 9.3  9.2   ALBUMIN 3.5 3.3*   PROT 7.0  --     135*  135*   K 4.4 4.1  4.1   CO2 23 23  23    104  104   BUN 23 24*  24*   CREATININE 1.7* 1.8*  1.8*   ALKPHOS 73  --    ALT 10  --    AST 17  --    BILITOT 0.4  --      Coagulation: No results for input(s): PT, INR, APTT in the last 168 hours.  LFTs:   Recent Labs  Lab 06/12/18 0901 06/13/18 0457   ALT 10  --    AST 17  --    ALKPHOS 73  --    BILITOT 0.4  --    PROT 7.0  --    ALBUMIN 3.5 3.3*     All labs within the past 24 hours have been reviewed.    Significant Imaging: reviewed

## 2018-06-13 NOTE — PROGRESS NOTES
[facility] Hospital Medicine  Progress Note    Patient Name: Lavelle Ladd  MRN: [id number]  Patient Class: IP- Inpatient   Admission Date: 6/12/2018  Length of Stay: 1 days  Attending Physician: Stephanie Mueller MD  Primary Care Provider: Rishabh Esteban MD        Subjective:     Principal Problem:Open wound of left great toe    HPI:  Lavelle Ladd is a 62 year old male with type II diabetes mellitus, diabetic neuropathy, chronic back pain, right above the knee amputation and history of kidney transplant on 5/21/16, on Prograf and prednisone, who presents to the ED for an infected wound on his left distal hallux. The patient states he injured his toe while cutting his toe nail approximately three weeks ago. He initially presented to the ED on 6/1/18, a week after the initial injury. In the ED, the ecchymotic tissue on the toe was debrided and he was sent home on clindamycin. The patient states he finished the clindamycin regimen on 6/9/18. He reports that he has a Podiatrist appointment tomorrow 6/13/18. However, he came in today because his foot began draining this morning. The patient has additional complaints of diffuse left foot pain, but he has decreased sensation at the site of his wound. He denies any fever, chills or known history of peripheral artery disease. He does express that he generally does not feel well and has chronic back pain. In the ED, purulent drainage was noted from his wound by the ED physician. His WBC count is within normal limits. His creatinine is at his baseline of 1.7. He has a mildly elevated ESR of 11, but CRP is normal. US of the left lower extremity is significant for evidence of peripheral vascular disease of the left lower extremity arterial system with variable plaque, probable 50% stenosis of the distal left SFA and monophasic waveforms within the anterior tibial, posterior tibial and peroneal arteries of the left calf. X-ray of the left foot was negative  for osteomyelitis.     Hospital Course:  6/13  Patient complaining of pain in toe . Waiting on vascular recommendation . Continue with iv antibotics         Review of Systems   Constitutional: Negative for activity change and appetite change.   HENT: Negative for congestion and facial swelling.    Eyes: Negative for pain, discharge and redness.   Respiratory: Negative for apnea, cough and chest tightness.    Cardiovascular: Negative for chest pain, palpitations and leg swelling.   Gastrointestinal: Negative for abdominal distention.   Genitourinary: Negative for difficulty urinating, dysuria and frequency.   Musculoskeletal: Negative for neck pain and neck stiffness.   Skin: Positive for wound. Negative for color change and rash.        Dry gangrene on left great toe , purulent collection noted    Neurological: Negative for dizziness, weakness and numbness.   Psychiatric/Behavioral: Negative for sleep disturbance.   All other systems reviewed and are negative.    Objective:     Vital Signs (Most Recent):  Temp: 98.2 °F (36.8 °C) (06/13/18 1125)  Pulse: 75 (06/13/18 1126)  Resp: 18 (06/13/18 1125)  BP: (!) 176/93 (06/13/18 1126)  SpO2: 98 % (06/13/18 1125) Vital Signs (24h Range):  Temp:  [96 °F (35.6 °C)-98.3 °F (36.8 °C)] 98.2 °F (36.8 °C)  Pulse:  [60-81] 75  Resp:  [16-19] 18  SpO2:  [92 %-98 %] 98 %  BP: (141-225)/() 176/93     Weight: 98.4 kg (217 lb)  Body mass index is 30.27 kg/m².    Intake/Output Summary (Last 24 hours) at 06/13/18 1529  Last data filed at 06/13/18 0644   Gross per 24 hour   Intake              690 ml   Output              350 ml   Net              340 ml      Physical Exam   Constitutional: He is oriented to person, place, and time. He appears well-developed and well-nourished. No distress.   HENT:   Head: Normocephalic and atraumatic.   Eyes: Pupils are equal, round, and reactive to light. Right eye exhibits no discharge.   Neck: Normal range of motion. Neck supple. No tracheal  deviation present. No thyromegaly present.   Cardiovascular: Normal rate, regular rhythm, normal heart sounds and intact distal pulses.  Exam reveals no gallop and no friction rub.    No murmur heard.  Pulmonary/Chest: Effort normal and breath sounds normal. He has no wheezes. He has no rales.   Abdominal: Soft. He exhibits no mass. There is no tenderness. There is no rebound and no guarding.   No allograft tenderness    Musculoskeletal: Normal range of motion. He exhibits no edema.   Left toe gangrene , s/p right BKA    Lymphadenopathy:     He has no cervical adenopathy.   Neurological: He is alert and oriented to person, place, and time.   Skin: Skin is warm. No rash noted. He is not diaphoretic. No erythema.   Nursing note and vitals reviewed.      Significant Labs: All pertinent labs within the past 24 hours have been reviewed.    Significant Imaging: I have reviewed all pertinent imaging results/findings within the past 24 hours.    Assessment/Plan:      * Open wound of left great toe    -Continue IV vancomycin and Zosyn.   -MRI of left foot.   -Consult Podiatry.   -NPO after midnight for possible Podiatry intervention.          PVD (peripheral vascular disease)    -Vascular surgery consult.   -Continue ASA.         Type 2 diabetes mellitus with diabetic neuropathy, with long-term current use of insulin    -Levemir and NISS.   -Diabetic diet.   -Continue gabapentin.           Chronic obstructive pulmonary disease    Continue inhaled medications.           Long-term use of immunosuppressant medication    Continue prednisone and Prograf.            donor kidney transplant for DM 16    -Continue prednisone and Prograf, per Nephrology.  -Monitor.           VTE Risk Mitigation         Ordered     IP VTE HIGH RISK PATIENT  Once      18 1606     Place sequential compression device  Until discontinued      18 1042              Stephanie Mueller MD  Department of Hospital Medicine   [facility]

## 2018-06-14 LAB
ALBUMIN SERPL BCP-MCNC: 3.4 G/DL
ANION GAP SERPL CALC-SCNC: 10 MMOL/L
ANION GAP SERPL CALC-SCNC: 9 MMOL/L
BACTERIA SPEC AEROBE CULT: NORMAL
BASOPHILS # BLD AUTO: 0.01 K/UL
BASOPHILS NFR BLD: 0.2 %
BUN SERPL-MCNC: 21 MG/DL
BUN SERPL-MCNC: 23 MG/DL
CALCIUM SERPL-MCNC: 9.7 MG/DL
CALCIUM SERPL-MCNC: 9.7 MG/DL
CHLORIDE SERPL-SCNC: 103 MMOL/L
CHLORIDE SERPL-SCNC: 103 MMOL/L
CO2 SERPL-SCNC: 22 MMOL/L
CO2 SERPL-SCNC: 23 MMOL/L
CREAT SERPL-MCNC: 1.8 MG/DL
CREAT SERPL-MCNC: 1.9 MG/DL
DIFFERENTIAL METHOD: NORMAL
EOSINOPHIL # BLD AUTO: 0.1 K/UL
EOSINOPHIL NFR BLD: 1.1 %
ERYTHROCYTE [DISTWIDTH] IN BLOOD BY AUTOMATED COUNT: 13.7 %
EST. GFR  (AFRICAN AMERICAN): 42 ML/MIN/1.73 M^2
EST. GFR  (AFRICAN AMERICAN): 45 ML/MIN/1.73 M^2
EST. GFR  (NON AFRICAN AMERICAN): 36 ML/MIN/1.73 M^2
EST. GFR  (NON AFRICAN AMERICAN): 39 ML/MIN/1.73 M^2
GLUCOSE SERPL-MCNC: 240 MG/DL
GLUCOSE SERPL-MCNC: 242 MG/DL
HCT VFR BLD AUTO: 48.7 %
HGB BLD-MCNC: 16.1 G/DL
LYMPHOCYTES # BLD AUTO: 1.4 K/UL
LYMPHOCYTES NFR BLD: 21.6 %
MAGNESIUM SERPL-MCNC: 1.8 MG/DL
MCH RBC QN AUTO: 31 PG
MCHC RBC AUTO-ENTMCNC: 33.1 G/DL
MCV RBC AUTO: 94 FL
MONOCYTES # BLD AUTO: 0.6 K/UL
MONOCYTES NFR BLD: 9.5 %
NEUTROPHILS # BLD AUTO: 4.3 K/UL
NEUTROPHILS NFR BLD: 67.6 %
PERIPHERAL PTA: YES
PHOSPHATE SERPL-MCNC: 2.4 MG/DL
PHOSPHATE SERPL-MCNC: 2.4 MG/DL
PLATELET # BLD AUTO: 214 K/UL
PMV BLD AUTO: 10 FL
POC ACTIVATED CLOTTING TIME K: 224 SEC (ref 74–137)
POCT GLUCOSE: 159 MG/DL (ref 70–110)
POCT GLUCOSE: 198 MG/DL (ref 70–110)
POCT GLUCOSE: 201 MG/DL (ref 70–110)
POTASSIUM SERPL-SCNC: 4.2 MMOL/L
POTASSIUM SERPL-SCNC: 4.2 MMOL/L
RBC # BLD AUTO: 5.19 M/UL
SAMPLE: ABNORMAL
SODIUM SERPL-SCNC: 135 MMOL/L
SODIUM SERPL-SCNC: 135 MMOL/L
TACROLIMUS BLD-MCNC: 4.2 NG/ML
WBC # BLD AUTO: 6.34 K/UL

## 2018-06-14 PROCEDURE — 80069 RENAL FUNCTION PANEL: CPT

## 2018-06-14 PROCEDURE — 25000003 PHARM REV CODE 250: Performed by: PHYSICIAN ASSISTANT

## 2018-06-14 PROCEDURE — 99233 SBSQ HOSP IP/OBS HIGH 50: CPT | Mod: ,,, | Performed by: INTERNAL MEDICINE

## 2018-06-14 PROCEDURE — 25000242 PHARM REV CODE 250 ALT 637 W/ HCPCS: Performed by: PHYSICIAN ASSISTANT

## 2018-06-14 PROCEDURE — 047L3ZZ DILATION OF LEFT FEMORAL ARTERY, PERCUTANEOUS APPROACH: ICD-10-PCS | Performed by: SURGERY

## 2018-06-14 PROCEDURE — 25000003 PHARM REV CODE 250: Performed by: INTERNAL MEDICINE

## 2018-06-14 PROCEDURE — 04CL3ZZ EXTIRPATION OF MATTER FROM LEFT FEMORAL ARTERY, PERCUTANEOUS APPROACH: ICD-10-PCS | Performed by: SURGERY

## 2018-06-14 PROCEDURE — 63600175 PHARM REV CODE 636 W HCPCS

## 2018-06-14 PROCEDURE — 82962 GLUCOSE BLOOD TEST: CPT | Performed by: SURGERY

## 2018-06-14 PROCEDURE — C1769 GUIDE WIRE: HCPCS

## 2018-06-14 PROCEDURE — 96372 THER/PROPH/DIAG INJ SC/IM: CPT

## 2018-06-14 PROCEDURE — 94761 N-INVAS EAR/PLS OXIMETRY MLT: CPT

## 2018-06-14 PROCEDURE — 25000003 PHARM REV CODE 250

## 2018-06-14 PROCEDURE — 63600175 PHARM REV CODE 636 W HCPCS: Performed by: INTERNAL MEDICINE

## 2018-06-14 PROCEDURE — 27200991 HC D-STAT PATCH: Performed by: SURGERY

## 2018-06-14 PROCEDURE — 85347 COAGULATION TIME ACTIVATED: CPT | Performed by: SURGERY

## 2018-06-14 PROCEDURE — 94640 AIRWAY INHALATION TREATMENT: CPT

## 2018-06-14 PROCEDURE — 85025 COMPLETE CBC W/AUTO DIFF WBC: CPT

## 2018-06-14 PROCEDURE — 63600175 PHARM REV CODE 636 W HCPCS: Performed by: EMERGENCY MEDICINE

## 2018-06-14 PROCEDURE — 94799 UNLISTED PULMONARY SVC/PX: CPT

## 2018-06-14 PROCEDURE — 25000003 PHARM REV CODE 250: Performed by: EMERGENCY MEDICINE

## 2018-06-14 PROCEDURE — 83735 ASSAY OF MAGNESIUM: CPT

## 2018-06-14 PROCEDURE — 36415 COLL VENOUS BLD VENIPUNCTURE: CPT

## 2018-06-14 PROCEDURE — 84100 ASSAY OF PHOSPHORUS: CPT

## 2018-06-14 PROCEDURE — 80048 BASIC METABOLIC PNL TOTAL CA: CPT

## 2018-06-14 PROCEDURE — B41D1ZZ FLUOROSCOPY OF AORTA AND BILATERAL LOWER EXTREMITY ARTERIES USING LOW OSMOLAR CONTRAST: ICD-10-PCS | Performed by: SURGERY

## 2018-06-14 PROCEDURE — 25500020 PHARM REV CODE 255

## 2018-06-14 PROCEDURE — 21400001 HC TELEMETRY ROOM

## 2018-06-14 RX ORDER — MORPHINE SULFATE 10 MG/ML
3 INJECTION INTRAMUSCULAR; INTRAVENOUS; SUBCUTANEOUS
Status: DISCONTINUED | OUTPATIENT
Start: 2018-06-14 | End: 2018-06-15 | Stop reason: HOSPADM

## 2018-06-14 RX ORDER — PROTAMINE SULFATE 10 MG/ML
25 INJECTION, SOLUTION INTRAVENOUS ONCE
Status: DISCONTINUED | OUTPATIENT
Start: 2018-06-14 | End: 2018-06-14

## 2018-06-14 RX ORDER — SODIUM CHLORIDE 450 MG/100ML
20 INJECTION, SOLUTION INTRAVENOUS CONTINUOUS
Status: DISCONTINUED | OUTPATIENT
Start: 2018-06-14 | End: 2018-06-15

## 2018-06-14 RX ORDER — HYDROCODONE BITARTRATE AND ACETAMINOPHEN 5; 325 MG/1; MG/1
1 TABLET ORAL EVERY 4 HOURS PRN
Status: DISCONTINUED | OUTPATIENT
Start: 2018-06-14 | End: 2018-06-15 | Stop reason: HOSPADM

## 2018-06-14 RX ORDER — PROTAMINE SULFATE 10 MG/ML
20 INJECTION, SOLUTION INTRAVENOUS ONCE AS NEEDED
Status: COMPLETED | OUTPATIENT
Start: 2018-06-14 | End: 2018-06-14

## 2018-06-14 RX ADMIN — PROTAMINE SULFATE 20 MG: 10 INJECTION, SOLUTION INTRAVENOUS at 06:06

## 2018-06-14 RX ADMIN — SODIUM BICARBONATE TAB 650 MG 2600 MG: 650 TAB at 09:06

## 2018-06-14 RX ADMIN — OXYCODONE HYDROCHLORIDE AND ACETAMINOPHEN 2 TABLET: 5; 325 TABLET ORAL at 01:06

## 2018-06-14 RX ADMIN — PREDNISONE 5 MG: 5 TABLET ORAL at 08:06

## 2018-06-14 RX ADMIN — OXYCODONE HYDROCHLORIDE AND ACETAMINOPHEN 2 TABLET: 5; 325 TABLET ORAL at 09:06

## 2018-06-14 RX ADMIN — OXYCODONE HYDROCHLORIDE AND ACETAMINOPHEN 2 TABLET: 5; 325 TABLET ORAL at 08:06

## 2018-06-14 RX ADMIN — ASPIRIN 81 MG: 81 TABLET, COATED ORAL at 08:06

## 2018-06-14 RX ADMIN — BUDESONIDE 0.5 MG: 0.5 SUSPENSION RESPIRATORY (INHALATION) at 07:06

## 2018-06-14 RX ADMIN — SODIUM CHLORIDE: 0.9 INJECTION, SOLUTION INTRAVENOUS at 07:06

## 2018-06-14 RX ADMIN — PIPERACILLIN AND TAZOBACTAM 4.5 G: 4; .5 INJECTION, POWDER, LYOPHILIZED, FOR SOLUTION INTRAVENOUS; PARENTERAL at 03:06

## 2018-06-14 RX ADMIN — VANCOMYCIN HYDROCHLORIDE 1250 MG: 1 INJECTION, POWDER, LYOPHILIZED, FOR SOLUTION INTRAVENOUS at 11:06

## 2018-06-14 RX ADMIN — INSULIN ASPART 2 UNITS: 100 INJECTION, SOLUTION INTRAVENOUS; SUBCUTANEOUS at 06:06

## 2018-06-14 RX ADMIN — INSULIN DETEMIR 5 UNITS: 100 INJECTION, SOLUTION SUBCUTANEOUS at 09:06

## 2018-06-14 RX ADMIN — TACROLIMUS 1.5 MG: 1 CAPSULE ORAL at 08:06

## 2018-06-14 RX ADMIN — GABAPENTIN 100 MG: 100 CAPSULE ORAL at 08:06

## 2018-06-14 RX ADMIN — SODIUM BICARBONATE TAB 650 MG 2600 MG: 650 TAB at 08:06

## 2018-06-14 RX ADMIN — AMLODIPINE BESYLATE 2.5 MG: 2.5 TABLET ORAL at 08:06

## 2018-06-14 RX ADMIN — GABAPENTIN 100 MG: 100 CAPSULE ORAL at 09:06

## 2018-06-14 RX ADMIN — TACROLIMUS 1.5 MG: 1 CAPSULE ORAL at 11:06

## 2018-06-14 RX ADMIN — ARFORMOTEROL TARTRATE 15 MCG: 15 SOLUTION RESPIRATORY (INHALATION) at 07:06

## 2018-06-14 RX ADMIN — PIPERACILLIN AND TAZOBACTAM 4.5 G: 4; .5 INJECTION, POWDER, LYOPHILIZED, FOR SOLUTION INTRAVENOUS; PARENTERAL at 11:06

## 2018-06-14 RX ADMIN — PIPERACILLIN AND TAZOBACTAM 4.5 G: 4; .5 INJECTION, POWDER, LYOPHILIZED, FOR SOLUTION INTRAVENOUS; PARENTERAL at 08:06

## 2018-06-14 RX ADMIN — ZOLPIDEM TARTRATE 5 MG: 5 TABLET ORAL at 11:06

## 2018-06-14 NOTE — INTERVAL H&P NOTE
The patient has been examined and the H&P has been reviewed:    I concur with the findings and no changes have occurred since H&P was written.    Anesthesia/Surgery risks, benefits and alternative options discussed and understood by patient/family.          Active Hospital Problems    Diagnosis  POA    *Open wound of left great toe [S91.102A]  Yes    Gangrene [I96]  Yes    PVD (peripheral vascular disease) [I73.9]  Yes     Chronic     RLE BKA  CT 16 Extensive atherosclerotic disease of the aorta and mesenteric arteries.        Type 2 diabetes mellitus with diabetic neuropathy, with long-term current use of insulin [E11.40, Z79.4]  Not Applicable    Chronic obstructive pulmonary disease [J44.9]  Yes     donor kidney transplant for DM 16 [Z94.0]  Not Applicable     Chronic     Induction with Thymo x3 and IV solumedrol to total 875mg    Kidney Biopsy   2016: 16 glomeruli, ACR type 1 AVR type 2, significant microcirculatory changes, c4d negative, No DSA, 5 to10% fibrosis. Treated with thymo x8 (5 full, 3 half doses)  2016: insufficient sample since it had no glomeruli; C4d was negative in peritubular capillaries but insufficient tissue to comment on presence of rejection  8/15/16: 25 glomeruli, moderate microcirculatory inflammation, positive C4d (20-30%), interstitial infiltrate with tubulitis but <5% thus not meeting diagnostic criteria for ACR but suspicious for ACR, no AVR, CNI toxicity, ~10% interstitial fibrosis, 7 foci of calcium phos crystals. Rx SM pulsex3 in [place] -16 Endothelial cell crossmatch against target cells EC1 and EC2 negative, no DILIA antibodies detected. AT1R also negative. At present time no change in management. (resulted )        Long-term use of immunosuppressant medication [Z79.899]  Not Applicable      Resolved Hospital Problems    Diagnosis Date Resolved POA   No resolved problems to display.

## 2018-06-14 NOTE — ASSESSMENT & PLAN NOTE
1. S/p DDRT in 2016 : stable allograft fn , cont Prograf 1.5 mg bid and Pred 5 mg daily ,    2. HTN : resume home BP meds, follow , add Aml    3. Left toe gangrene,  on broad spectrum abx , reviewed Vascular and Podiatry notes, awaiting angiogram, discussed with vascular, cont  gently hydration ,     4. Metabolic acidosis : on  Bicarb supplements       5. Anemia of CKD : stable Hb

## 2018-06-14 NOTE — SUBJECTIVE & OBJECTIVE
Interval History:  No acute events ,     Review of patient's allergies indicates: No Known Allergies    Current Facility-Administered Medications   Medication Frequency    0.9%  NaCl infusion Continuous    acetaminophen tablet 650 mg Q6H PRN    albuterol-ipratropium 2.5 mg-0.5 mg/3 mL nebulizer solution 3 mL Q6H PRN    amLODIPine tablet 2.5 mg Daily    arformoterol nebulizer solution 15 mcg Q12H    aspirin EC tablet 81 mg Daily    budesonide nebulizer solution 0.5 mg Q12H    dextrose 50% injection 12.5 g PRN    dextrose 50% injection 25 g PRN    gabapentin capsule 100 mg TID    glucagon (human recombinant) injection 1 mg PRN    glucose chewable tablet 16 g PRN    glucose chewable tablet 24 g PRN    hydrALAZINE injection 10 mg Q6H PRN    insulin aspart U-100 pen 0-5 Units QID (AC + HS) PRN    insulin detemir U-100 pen 5 Units QHS    ondansetron disintegrating tablet 8 mg Q8H PRN    oxyCODONE-acetaminophen 5-325 mg per tablet 2 tablet Q6H PRN    piperacillin-tazobactam 4.5 g in dextrose 5 % 100 mL IVPB (ready to mix system) Q8H    predniSONE tablet 5 mg Daily    sodium bicarbonate tablet 2,600 mg BID    tacrolimus capsule 1.5 mg BID    vancomycin (VANCOCIN) 1,250 mg in sodium chloride 0.9% 250 mL IVPB Q18H    zolpidem tablet 5 mg Nightly PRN       Objective:     Vital Signs (Most Recent):  Temp: 97.5 °F (36.4 °C) (06/14/18 0731)  Pulse: 79 (06/14/18 0731)  Resp: 18 (06/14/18 0731)  BP: 121/83 (06/14/18 0731)  SpO2: 96 % (06/14/18 0731)  O2 Device (Oxygen Therapy): room air (06/14/18 0900) Vital Signs (24h Range):  Temp:  [97.5 °F (36.4 °C)-98.3 °F (36.8 °C)] 97.5 °F (36.4 °C)  Pulse:  [69-83] 79  Resp:  [18-20] 18  SpO2:  [92 %-97 %] 96 %  BP: (121-182)/(72-87) 121/83     Weight: 97.3 kg (214 lb 8.1 oz) (06/14/18 0319)  Body mass index is 29.92 kg/m².  Body surface area is 2.21 meters squared.    I/O last 3 completed shifts:  In: 2140 [P.O.:690; I.V.:550; IV Piggyback:900]  Out: 1900  [Urine:1900]    Physical Exam   Constitutional: He is oriented to person, place, and time. He appears well-developed and well-nourished. No distress.   HENT:   Head: Normocephalic and atraumatic.   Eyes: Pupils are equal, round, and reactive to light. Right eye exhibits no discharge.   Neck: Normal range of motion. Neck supple. No tracheal deviation present. No thyromegaly present.   Cardiovascular: Normal rate, regular rhythm, normal heart sounds and intact distal pulses.  Exam reveals no gallop and no friction rub.    No murmur heard.  Pulmonary/Chest: Effort normal and breath sounds normal. He has no wheezes. He has no rales.   Abdominal: Soft. He exhibits no mass. There is no tenderness. There is no rebound and no guarding.   No allograft tenderness    Musculoskeletal: Normal range of motion. He exhibits no edema.   Left toe gangrene , s/p right BKA    Lymphadenopathy:     He has no cervical adenopathy.   Neurological: He is alert and oriented to person, place, and time.   Skin: Skin is warm. No rash noted. He is not diaphoretic. No erythema.   Nursing note and vitals reviewed.      Significant Labs:  CBC:   Recent Labs  Lab 06/14/18 0451   WBC 6.34   RBC 5.19   HGB 16.1   HCT 48.7      MCV 94   MCH 31.0   MCHC 33.1     CMP:   Recent Labs  Lab 06/12/18  0901 06/14/18  0451   *  < > 240*  242*   CALCIUM 9.4  < > 9.7  9.7   ALBUMIN 3.5  < > 3.4*   PROT 7.0  --   --      < > 135*  135*   K 4.4  < > 4.2  4.2   CO2 23  < > 23  22*     < > 103  103   BUN 23  < > 23  21   CREATININE 1.7*  < > 1.8*  1.9*   ALKPHOS 73  --   --    ALT 10  --   --    AST 17  --   --    BILITOT 0.4  --   --    < > = values in this interval not displayed.  Coagulation: No results for input(s): PT, INR, APTT in the last 168 hours.  LFTs:   Recent Labs  Lab 06/12/18  0901  06/14/18  0451   ALT 10  --   --    AST 17  --   --    ALKPHOS 73  --   --    BILITOT 0.4  --   --    PROT 7.0  --   --    ALBUMIN 3.5  <  > 3.4*   < > = values in this interval not displayed.     All labs within the past 24 hours have been reviewed.     Significant Imaging: reviewed

## 2018-06-14 NOTE — PLAN OF CARE
Problem: Patient Care Overview  Goal: Plan of Care Review  Outcome: Ongoing (interventions implemented as appropriate)  POC discussed w/patient, verbalized understanding.   NSR on monitor. AAO X 4. VSS.   Voids per urinal at bedside.   IV intact. Medications administered as prescribed.   PRN hydralazine given for evaluated blood pressure.  Patient NPO since midnight.      Patient prothesis at bedside.   Patient turns independently in bed.   Fall precautions in place, call bell in reach, bed in low and locked position.  Patient remains free from falls/injuries.   Patient denies needs at this time.   Will continue to monitor.

## 2018-06-14 NOTE — PROGRESS NOTES
[facility] -   Nephrology  Progress Note    Patient Name: Lavelle Ladd  MRN: [id number]  Admission Date: 2018  Hospital Length of Stay: 2 days  Attending Provider: Stephanie Mueller MD   Primary Care Physician: Rishabh Esteban MD  Principal Problem:Open wound of left great toe    Subjective:     HPI: Lavelle Ladd is a pleasant 62 y old  man  who received a  - brain death kidney transplant on 16.  He has CKD stage 3 - GFR 30-59 and his baseline creatinine is between 1.6-1.8. He takes  prednisone and tacrolimus for maintenance immunosuppression.  MMF was stopped few months ago , s/p right BKA , has dry gangrene on his left great toe for few days, now developed purulent discharge, admitted for the same , Podiatry consulted , renal fn at his baseline ,        Interval History:  No acute events ,     Review of patient's allergies indicates: No Known Allergies    Current Facility-Administered Medications   Medication Frequency    0.9%  NaCl infusion Continuous    acetaminophen tablet 650 mg Q6H PRN    albuterol-ipratropium 2.5 mg-0.5 mg/3 mL nebulizer solution 3 mL Q6H PRN    amLODIPine tablet 2.5 mg Daily    arformoterol nebulizer solution 15 mcg Q12H    aspirin EC tablet 81 mg Daily    budesonide nebulizer solution 0.5 mg Q12H    dextrose 50% injection 12.5 g PRN    dextrose 50% injection 25 g PRN    gabapentin capsule 100 mg TID    glucagon (human recombinant) injection 1 mg PRN    glucose chewable tablet 16 g PRN    glucose chewable tablet 24 g PRN    hydrALAZINE injection 10 mg Q6H PRN    insulin aspart U-100 pen 0-5 Units QID (AC + HS) PRN    insulin detemir U-100 pen 5 Units QHS    ondansetron disintegrating tablet 8 mg Q8H PRN    oxyCODONE-acetaminophen 5-325 mg per tablet 2 tablet Q6H PRN    piperacillin-tazobactam 4.5 g in dextrose 5 % 100 mL IVPB (ready to mix system) Q8H    predniSONE tablet 5 mg Daily    sodium bicarbonate tablet 2,600 mg  BID    tacrolimus capsule 1.5 mg BID    vancomycin (VANCOCIN) 1,250 mg in sodium chloride 0.9% 250 mL IVPB Q18H    zolpidem tablet 5 mg Nightly PRN       Objective:     Vital Signs (Most Recent):  Temp: 97.5 °F (36.4 °C) (06/14/18 0731)  Pulse: 79 (06/14/18 0731)  Resp: 18 (06/14/18 0731)  BP: 121/83 (06/14/18 0731)  SpO2: 96 % (06/14/18 0731)  O2 Device (Oxygen Therapy): room air (06/14/18 0900) Vital Signs (24h Range):  Temp:  [97.5 °F (36.4 °C)-98.3 °F (36.8 °C)] 97.5 °F (36.4 °C)  Pulse:  [69-83] 79  Resp:  [18-20] 18  SpO2:  [92 %-97 %] 96 %  BP: (121-182)/(72-87) 121/83     Weight: 97.3 kg (214 lb 8.1 oz) (06/14/18 0319)  Body mass index is 29.92 kg/m².  Body surface area is 2.21 meters squared.    I/O last 3 completed shifts:  In: 2140 [P.O.:690; I.V.:550; IV Piggyback:900]  Out: 1900 [Urine:1900]    Physical Exam   Constitutional: He is oriented to person, place, and time. He appears well-developed and well-nourished. No distress.   HENT:   Head: Normocephalic and atraumatic.   Eyes: Pupils are equal, round, and reactive to light. Right eye exhibits no discharge.   Neck: Normal range of motion. Neck supple. No tracheal deviation present. No thyromegaly present.   Cardiovascular: Normal rate, regular rhythm, normal heart sounds and intact distal pulses.  Exam reveals no gallop and no friction rub.    No murmur heard.  Pulmonary/Chest: Effort normal and breath sounds normal. He has no wheezes. He has no rales.   Abdominal: Soft. He exhibits no mass. There is no tenderness. There is no rebound and no guarding.   No allograft tenderness    Musculoskeletal: Normal range of motion. He exhibits no edema.   Left toe gangrene , s/p right BKA    Lymphadenopathy:     He has no cervical adenopathy.   Neurological: He is alert and oriented to person, place, and time.   Skin: Skin is warm. No rash noted. He is not diaphoretic. No erythema.   Nursing note and vitals reviewed.      Significant Labs:  CBC:   Recent  Labs  Lab 18   WBC 6.34   RBC 5.19   HGB 16.1   HCT 48.7      MCV 94   MCH 31.0   MCHC 33.1     CMP:   Recent Labs  Lab 18   *  < > 240*  242*   CALCIUM 9.4  < > 9.7  9.7   ALBUMIN 3.5  < > 3.4*   PROT 7.0  --   --      < > 135*  135*   K 4.4  < > 4.2  4.2   CO2 23  < > 23  22*     < > 103  103   BUN 23  < > 23  21   CREATININE 1.7*  < > 1.8*  1.9*   ALKPHOS 73  --   --    ALT 10  --   --    AST 17  --   --    BILITOT 0.4  --   --    < > = values in this interval not displayed.  Coagulation: No results for input(s): PT, INR, APTT in the last 168 hours.  LFTs:   Recent Labs  Lab 18   ALT 10  --   --    AST 17  --   --    ALKPHOS 73  --   --    BILITOT 0.4  --   --    PROT 7.0  --   --    ALBUMIN 3.5  < > 3.4*   < > = values in this interval not displayed.     All labs within the past 24 hours have been reviewed.     Significant Imaging: reviewed     Assessment/Plan:      donor kidney transplant for DM 16    1. S/p DDRT in 2016 : stable allograft fn , cont Prograf 1.5 mg bid and Pred 5 mg daily ,    2. HTN : resume home BP meds, follow , add Aml    3. Left toe gangrene,  on broad spectrum abx , reviewed Vascular and Podiatry notes, awaiting angiogram, discussed with vascular, cont  gently hydration ,     4. Metabolic acidosis : on  Bicarb supplements       5. Anemia of CKD : stable Hb              I will follow-up with patient. Please contact us if you have any additional questions.     Total time spent 40 minutes including time needed to review the records,  patient  evaluation, documentation, face-to-face discussion with the patient,  primary team ,   more than 50% of the time was spent on coordination of care and counseling.       Carlos Staley MD  Nephrology  [facility] -

## 2018-06-14 NOTE — NURSING
"Patient assessed for diabetes educational needs following chart review  He reports was diagnosed over 7 years ago and has attended diabetes education class in the past  He has a home glucose monitor and alternates testing times, and checks once daily    Averages 200-300's  He reports is "ok" with administering his insulin    He uses the vial/syringe method    Recommended he store insulin in the refrigerator     Reports proper site selection/rotation  Reviewed info on target glucose values/hyper/hypoglycemia  He acknowledges he "knows what he's supposed to do but is not the best at it"   Discussed making small changes for improvement and provided with examples  He was receptive but did not seem to be interested in changes  Verbalizes understanding     Literature provided  "

## 2018-06-15 VITALS
SYSTOLIC BLOOD PRESSURE: 167 MMHG | HEART RATE: 87 BPM | TEMPERATURE: 98 F | BODY MASS INDEX: 31.18 KG/M2 | RESPIRATION RATE: 17 BRPM | WEIGHT: 222.69 LBS | OXYGEN SATURATION: 93 % | DIASTOLIC BLOOD PRESSURE: 90 MMHG | HEIGHT: 71 IN

## 2018-06-15 LAB
ALBUMIN SERPL BCP-MCNC: 3.4 G/DL
ANION GAP SERPL CALC-SCNC: 10 MMOL/L
ANION GAP SERPL CALC-SCNC: 11 MMOL/L
BASOPHILS # BLD AUTO: 0.01 K/UL
BASOPHILS # BLD AUTO: 0.01 K/UL
BASOPHILS NFR BLD: 0.2 %
BASOPHILS NFR BLD: 0.2 %
BUN SERPL-MCNC: 21 MG/DL
BUN SERPL-MCNC: 21 MG/DL
CALCIUM SERPL-MCNC: 9.4 MG/DL
CALCIUM SERPL-MCNC: 9.4 MG/DL
CHLORIDE SERPL-SCNC: 107 MMOL/L
CHLORIDE SERPL-SCNC: 108 MMOL/L
CO2 SERPL-SCNC: 21 MMOL/L
CO2 SERPL-SCNC: 21 MMOL/L
CREAT SERPL-MCNC: 1.8 MG/DL
CREAT SERPL-MCNC: 1.8 MG/DL
DIFFERENTIAL METHOD: ABNORMAL
DIFFERENTIAL METHOD: ABNORMAL
EOSINOPHIL # BLD AUTO: 0.1 K/UL
EOSINOPHIL # BLD AUTO: 0.1 K/UL
EOSINOPHIL NFR BLD: 1.2 %
EOSINOPHIL NFR BLD: 1.4 %
ERYTHROCYTE [DISTWIDTH] IN BLOOD BY AUTOMATED COUNT: 14 %
ERYTHROCYTE [DISTWIDTH] IN BLOOD BY AUTOMATED COUNT: 14.2 %
EST. GFR  (AFRICAN AMERICAN): 45 ML/MIN/1.73 M^2
EST. GFR  (AFRICAN AMERICAN): 45 ML/MIN/1.73 M^2
EST. GFR  (NON AFRICAN AMERICAN): 39 ML/MIN/1.73 M^2
EST. GFR  (NON AFRICAN AMERICAN): 39 ML/MIN/1.73 M^2
GLUCOSE SERPL-MCNC: 184 MG/DL
GLUCOSE SERPL-MCNC: 184 MG/DL
HCT VFR BLD AUTO: 47.8 %
HCT VFR BLD AUTO: 48 %
HGB BLD-MCNC: 15.8 G/DL
HGB BLD-MCNC: 16 G/DL
LYMPHOCYTES # BLD AUTO: 1.3 K/UL
LYMPHOCYTES # BLD AUTO: 2.1 K/UL
LYMPHOCYTES NFR BLD: 20.7 %
LYMPHOCYTES NFR BLD: 31.3 %
MAGNESIUM SERPL-MCNC: 1.9 MG/DL
MCH RBC QN AUTO: 31.2 PG
MCH RBC QN AUTO: 31.6 PG
MCHC RBC AUTO-ENTMCNC: 32.9 G/DL
MCHC RBC AUTO-ENTMCNC: 33.5 G/DL
MCV RBC AUTO: 95 FL
MCV RBC AUTO: 95 FL
MONOCYTES # BLD AUTO: 0.5 K/UL
MONOCYTES # BLD AUTO: 0.7 K/UL
MONOCYTES NFR BLD: 8.8 %
MONOCYTES NFR BLD: 9.9 %
NEUTROPHILS # BLD AUTO: 3.7 K/UL
NEUTROPHILS # BLD AUTO: 4.2 K/UL
NEUTROPHILS NFR BLD: 57.2 %
NEUTROPHILS NFR BLD: 69.1 %
PHOSPHATE SERPL-MCNC: 2.3 MG/DL
PHOSPHATE SERPL-MCNC: 2.3 MG/DL
PLATELET # BLD AUTO: 195 K/UL
PLATELET # BLD AUTO: 205 K/UL
PMV BLD AUTO: 10.3 FL
PMV BLD AUTO: 9.9 FL
POCT GLUCOSE: 168 MG/DL (ref 70–110)
POCT GLUCOSE: 258 MG/DL (ref 70–110)
POCT GLUCOSE: 298 MG/DL (ref 70–110)
POTASSIUM SERPL-SCNC: 4 MMOL/L
POTASSIUM SERPL-SCNC: 4.1 MMOL/L
RBC # BLD AUTO: 5.06 M/UL
RBC # BLD AUTO: 5.07 M/UL
SODIUM SERPL-SCNC: 139 MMOL/L
SODIUM SERPL-SCNC: 139 MMOL/L
VANCOMYCIN TROUGH SERPL-MCNC: 18.4 UG/ML
WBC # BLD AUTO: 6.03 K/UL
WBC # BLD AUTO: 6.54 K/UL

## 2018-06-15 PROCEDURE — 80202 ASSAY OF VANCOMYCIN: CPT

## 2018-06-15 PROCEDURE — 63600175 PHARM REV CODE 636 W HCPCS: Performed by: INTERNAL MEDICINE

## 2018-06-15 PROCEDURE — 25000003 PHARM REV CODE 250: Performed by: EMERGENCY MEDICINE

## 2018-06-15 PROCEDURE — 25000003 PHARM REV CODE 250: Performed by: INTERNAL MEDICINE

## 2018-06-15 PROCEDURE — 84100 ASSAY OF PHOSPHORUS: CPT

## 2018-06-15 PROCEDURE — 63600175 PHARM REV CODE 636 W HCPCS: Performed by: EMERGENCY MEDICINE

## 2018-06-15 PROCEDURE — 80048 BASIC METABOLIC PNL TOTAL CA: CPT

## 2018-06-15 PROCEDURE — 36415 COLL VENOUS BLD VENIPUNCTURE: CPT

## 2018-06-15 PROCEDURE — 94799 UNLISTED PULMONARY SVC/PX: CPT

## 2018-06-15 PROCEDURE — 83735 ASSAY OF MAGNESIUM: CPT

## 2018-06-15 PROCEDURE — 80069 RENAL FUNCTION PANEL: CPT

## 2018-06-15 PROCEDURE — 85025 COMPLETE CBC W/AUTO DIFF WBC: CPT

## 2018-06-15 PROCEDURE — 94640 AIRWAY INHALATION TREATMENT: CPT

## 2018-06-15 PROCEDURE — 25000003 PHARM REV CODE 250: Performed by: SURGERY

## 2018-06-15 PROCEDURE — 25000242 PHARM REV CODE 250 ALT 637 W/ HCPCS: Performed by: PHYSICIAN ASSISTANT

## 2018-06-15 PROCEDURE — 99233 SBSQ HOSP IP/OBS HIGH 50: CPT | Mod: ,,, | Performed by: INTERNAL MEDICINE

## 2018-06-15 PROCEDURE — 25000003 PHARM REV CODE 250: Performed by: PHYSICIAN ASSISTANT

## 2018-06-15 RX ORDER — CEPHALEXIN 500 MG/1
500 CAPSULE ORAL EVERY 12 HOURS
Status: DISCONTINUED | OUTPATIENT
Start: 2018-06-15 | End: 2018-06-15 | Stop reason: HOSPADM

## 2018-06-15 RX ORDER — AMLODIPINE BESYLATE 2.5 MG/1
2.5 TABLET ORAL DAILY
Qty: 30 TABLET | Refills: 11 | Status: ON HOLD | OUTPATIENT
Start: 2018-06-16 | End: 2018-11-13 | Stop reason: HOSPADM

## 2018-06-15 RX ORDER — SODIUM,POTASSIUM PHOSPHATES 280-250MG
1 POWDER IN PACKET (EA) ORAL 2 TIMES DAILY
Status: DISCONTINUED | OUTPATIENT
Start: 2018-06-15 | End: 2018-06-15 | Stop reason: HOSPADM

## 2018-06-15 RX ORDER — CEPHALEXIN 500 MG/1
500 CAPSULE ORAL EVERY 12 HOURS
Qty: 14 CAPSULE | Refills: 0 | Status: SHIPPED | OUTPATIENT
Start: 2018-06-15 | End: 2018-06-22

## 2018-06-15 RX ORDER — HYDROCODONE BITARTRATE AND ACETAMINOPHEN 5; 325 MG/1; MG/1
1 TABLET ORAL EVERY 6 HOURS PRN
Qty: 20 TABLET | Refills: 0 | Status: SHIPPED | OUTPATIENT
Start: 2018-06-15 | End: 2018-07-06

## 2018-06-15 RX ADMIN — PIPERACILLIN AND TAZOBACTAM 4.5 G: 4; .5 INJECTION, POWDER, LYOPHILIZED, FOR SOLUTION INTRAVENOUS; PARENTERAL at 03:06

## 2018-06-15 RX ADMIN — PREDNISONE 5 MG: 5 TABLET ORAL at 08:06

## 2018-06-15 RX ADMIN — POTASSIUM & SODIUM PHOSPHATES POWDER PACK 280-160-250 MG 1 PACKET: 280-160-250 PACK at 04:06

## 2018-06-15 RX ADMIN — TACROLIMUS 1.5 MG: 1 CAPSULE ORAL at 05:06

## 2018-06-15 RX ADMIN — HYDRALAZINE HYDROCHLORIDE 10 MG: 20 INJECTION INTRAMUSCULAR; INTRAVENOUS at 05:06

## 2018-06-15 RX ADMIN — SODIUM BICARBONATE TAB 650 MG 2600 MG: 650 TAB at 08:06

## 2018-06-15 RX ADMIN — PIPERACILLIN AND TAZOBACTAM 4.5 G: 4; .5 INJECTION, POWDER, LYOPHILIZED, FOR SOLUTION INTRAVENOUS; PARENTERAL at 11:06

## 2018-06-15 RX ADMIN — ARFORMOTEROL TARTRATE 15 MCG: 15 SOLUTION RESPIRATORY (INHALATION) at 06:06

## 2018-06-15 RX ADMIN — INSULIN ASPART 3 UNITS: 100 INJECTION, SOLUTION INTRAVENOUS; SUBCUTANEOUS at 04:06

## 2018-06-15 RX ADMIN — BUDESONIDE 0.5 MG: 0.5 SUSPENSION RESPIRATORY (INHALATION) at 06:06

## 2018-06-15 RX ADMIN — VANCOMYCIN HYDROCHLORIDE 1250 MG: 1 INJECTION, POWDER, LYOPHILIZED, FOR SOLUTION INTRAVENOUS at 04:06

## 2018-06-15 RX ADMIN — GABAPENTIN 100 MG: 100 CAPSULE ORAL at 04:06

## 2018-06-15 RX ADMIN — OXYCODONE HYDROCHLORIDE AND ACETAMINOPHEN 2 TABLET: 5; 325 TABLET ORAL at 05:06

## 2018-06-15 RX ADMIN — HYDROCODONE BITARTRATE AND ACETAMINOPHEN 1 TABLET: 5; 325 TABLET ORAL at 12:06

## 2018-06-15 RX ADMIN — TACROLIMUS 1.5 MG: 1 CAPSULE ORAL at 08:06

## 2018-06-15 RX ADMIN — INSULIN ASPART 3 UNITS: 100 INJECTION, SOLUTION INTRAVENOUS; SUBCUTANEOUS at 11:06

## 2018-06-15 RX ADMIN — GABAPENTIN 100 MG: 100 CAPSULE ORAL at 08:06

## 2018-06-15 RX ADMIN — ASPIRIN 81 MG: 81 TABLET, COATED ORAL at 08:06

## 2018-06-15 RX ADMIN — AMLODIPINE BESYLATE 2.5 MG: 2.5 TABLET ORAL at 08:06

## 2018-06-15 NOTE — PROGRESS NOTES
[facility] Hospital Medicine  Progress Note    Patient Name: Lavelle Ladd  MRN: [id number]  Patient Class: IP- Inpatient   Admission Date: 6/12/2018  Length of Stay: 3 days  Attending Physician: Stephanie Mueller MD  Primary Care Provider: Rishabh Esteban MD        Subjective:     Principal Problem:Open wound of left great toe    HPI:  Lavelle Ladd is a 62 year old male with type II diabetes mellitus, diabetic neuropathy, chronic back pain, right above the knee amputation and history of kidney transplant on 5/21/16, on Prograf and prednisone, who presents to the ED for an infected wound on his left distal hallux. The patient states he injured his toe while cutting his toe nail approximately three weeks ago. He initially presented to the ED on 6/1/18, a week after the initial injury. In the ED, the ecchymotic tissue on the toe was debrided and he was sent home on clindamycin. The patient states he finished the clindamycin regimen on 6/9/18. He reports that he has a Podiatrist appointment tomorrow 6/13/18. However, he came in today because his foot began draining this morning. The patient has additional complaints of diffuse left foot pain, but he has decreased sensation at the site of his wound. He denies any fever, chills or known history of peripheral artery disease. He does express that he generally does not feel well and has chronic back pain. In the ED, purulent drainage was noted from his wound by the ED physician. His WBC count is within normal limits. His creatinine is at his baseline of 1.7. He has a mildly elevated ESR of 11, but CRP is normal. US of the left lower extremity is significant for evidence of peripheral vascular disease of the left lower extremity arterial system with variable plaque, probable 50% stenosis of the distal left SFA and monophasic waveforms within the anterior tibial, posterior tibial and peroneal arteries of the left calf. X-ray of the left foot was negative  for osteomyelitis.     Hospital Course:  6/13  Patient complaining of pain in toe . Waiting on vascular recommendation . Continue with iv antibotics   6/14  Patient will have arteriogram tomorrow         Review of Systems   Constitutional: Negative for activity change and appetite change.   HENT: Negative for congestion and facial swelling.    Eyes: Negative for pain, discharge and redness.   Respiratory: Negative for apnea, cough and chest tightness.    Cardiovascular: Negative for chest pain, palpitations and leg swelling.   Gastrointestinal: Negative for abdominal distention.   Genitourinary: Negative for difficulty urinating, dysuria and frequency.   Musculoskeletal: Negative for neck pain and neck stiffness.   Skin: Positive for wound. Negative for color change and rash.        Dry gangrene on left great toe , purulent collection noted    Neurological: Negative for dizziness, weakness and numbness.   Psychiatric/Behavioral: Negative for sleep disturbance.   All other systems reviewed and are negative.    Objective:     Vital Signs (Most Recent):  Temp: 97.5 °F (36.4 °C) (06/15/18 1651)  Pulse: 87 (06/15/18 1738)  Resp: 17 (06/15/18 0726)  BP: (!) 167/90 (06/15/18 1651)  SpO2: (!) 93 % (06/15/18 1651) Vital Signs (24h Range):  Temp:  [96.4 °F (35.8 °C)-98.4 °F (36.9 °C)] 97.5 °F (36.4 °C)  Pulse:  [74-99] 87  Resp:  [11-20] 17  SpO2:  [90 %-98 %] 93 %  BP: (120-207)/() 167/90     Weight: 101 kg (222 lb 10.6 oz)  Body mass index is 31.06 kg/m².    Intake/Output Summary (Last 24 hours) at 06/15/18 1803  Last data filed at 06/15/18 1209   Gross per 24 hour   Intake           1967.5 ml   Output              420 ml   Net           1547.5 ml      Physical Exam   Constitutional: He is oriented to person, place, and time. He appears well-developed and well-nourished. No distress.   HENT:   Head: Normocephalic and atraumatic.   Eyes: Pupils are equal, round, and reactive to light. Right eye exhibits no discharge.    Neck: Normal range of motion. Neck supple. No tracheal deviation present. No thyromegaly present.   Cardiovascular: Normal rate, regular rhythm, normal heart sounds and intact distal pulses.  Exam reveals no gallop and no friction rub.    No murmur heard.  Pulmonary/Chest: Effort normal and breath sounds normal. He has no wheezes. He has no rales.   Abdominal: Soft. He exhibits no mass. There is no tenderness. There is no rebound and no guarding.   No allograft tenderness    Musculoskeletal: Normal range of motion. He exhibits no edema.   Left toe gangrene , s/p right BKA    Lymphadenopathy:     He has no cervical adenopathy.   Neurological: He is alert and oriented to person, place, and time.   Skin: Skin is warm. No rash noted. He is not diaphoretic. No erythema.   Nursing note and vitals reviewed.      Significant Labs: All pertinent labs within the past 24 hours have been reviewed.    Significant Imaging: I have reviewed all pertinent imaging results/findings within the past 24 hours.    Assessment/Plan:      * Open wound of left great toe    -Continue IV vancomycin and Zosyn.   -MRI of left foot.   -Consult Podiatry.   -NPO after midnight for possible Podiatry intervention.          PVD (peripheral vascular disease)    -Vascular surgery consult.   -Continue ASA.         Type 2 diabetes mellitus with diabetic neuropathy, with long-term current use of insulin    -Levemir and NISS.   -Diabetic diet.   -Continue gabapentin.           Chronic obstructive pulmonary disease    Continue inhaled medications.            donor kidney transplant for DM 16    -Continue prednisone and Prograf, per Nephrology.  -Monitor.           VTE Risk Mitigation         Ordered     IP VTE HIGH RISK PATIENT  Once      18 1606     Place sequential compression device  Until discontinued      18 1044              Stephanie Mueller MD  Department of Hospital Medicine   [facility]

## 2018-06-15 NOTE — ASSESSMENT & PLAN NOTE
1. S/p DDRT in 2016 : stable allograft fn , cont Prograf 1.5 mg bid and Pred 5 mg daily ,    2. HTN : BP controlled,     3. Left toe gangrene,  on broad spectrum abx , reviewed Vascular and Podiatry notes,    S/p angiogram , will stop IV fluids at this point,     4. Metabolic acidosis : on  Bicarb supplements     5. Anemia of CKD : stable Hb     6. Replete Phos

## 2018-06-15 NOTE — DISCHARGE SUMMARY
[facility] Hospital Medicine  Discharge Summary      Patient Name: Lavelle Ladd  MRN: [id number]  Admission Date: 6/12/2018  Hospital Length of Stay: 3 days  Discharge Date and Time:  06/15/2018 6:28 PM  Attending Physician: Stephanie Mueller MD   Discharging Provider: Stephanie Mueller MD  Primary Care Provider: Rishabh Esteban MD      HPI:   Lavelle Ladd is a 62 year old male with type II diabetes mellitus, diabetic neuropathy, chronic back pain, right above the knee amputation and history of kidney transplant on 5/21/16, on Prograf and prednisone, who presents to the ED for an infected wound on his left distal hallux. The patient states he injured his toe while cutting his toe nail approximately three weeks ago. He initially presented to the ED on 6/1/18, a week after the initial injury. In the ED, the ecchymotic tissue on the toe was debrided and he was sent home on clindamycin. The patient states he finished the clindamycin regimen on 6/9/18. He reports that he has a Podiatrist appointment tomorrow 6/13/18. However, he came in today because his foot began draining this morning. The patient has additional complaints of diffuse left foot pain, but he has decreased sensation at the site of his wound. He denies any fever, chills or known history of peripheral artery disease. He does express that he generally does not feel well and has chronic back pain. In the ED, purulent drainage was noted from his wound by the ED physician. His WBC count is within normal limits. His creatinine is at his baseline of 1.7. He has a mildly elevated ESR of 11, but CRP is normal. US of the left lower extremity is significant for evidence of peripheral vascular disease of the left lower extremity arterial system with variable plaque, probable 50% stenosis of the distal left SFA and monophasic waveforms within the anterior tibial, posterior tibial and peroneal arteries of the left calf. X-ray of the left foot was  negative for osteomyelitis.     Procedure(s) (LRB):  LEFT LEG ANGIOGRAM (N/A)      Hospital Course:     Patient complaining of pain in toe . Waiting on vascular recommendation . Continue with iv antibotics     Patient will have arteriogram tomorrow   6/15  Patient was seen and examined today . Case disscused with poditry dr Hill . Ok to discharge on empirically antibotics for 7 days . Follow up with dr ball outpatient . And follow up with dr monique vascular in two weeks . Added norvasc to control blood pressure . Ok to take with tacrolimus discuss with nephrology      Consults:   Consults         Status Ordering Provider     Inpatient consult to Nephrology  Once     Provider:  Carlos Staley MD    Completed KAMI FLORES     Inpatient consult to Podiatry  Once     Provider:  Katerina Ball DPM    Completed RAMYA BELTRAN     Inpatient consult to Podiatry  Once     Provider:  Katerina Ball DPM    Completed KAMI FLORES     Inpatient consult to Vascular Surgery  Once     Provider:  Shelton Willis MD    Completed KAMI FLORES     Pharmacy to dose Vancomycin consult  Once     Provider:  (Not yet assigned)    Acknowledged RAMYA BELTRAN          No new Assessment & Plan notes have been filed under this hospital service since the last note was generated.  Service: Hospital Medicine    Final Active Diagnoses:    Diagnosis Date Noted POA    PRINCIPAL PROBLEM:  Open wound of left great toe [S91.102A] 2018 Yes    Gangrene [I96] 2018 Yes    PVD (peripheral vascular disease) [I73.9] 2017 Yes     Chronic    Type 2 diabetes mellitus with diabetic neuropathy, with long-term current use of insulin [E11.40, Z79.4] 2016 Not Applicable    Chronic obstructive pulmonary disease [J44.9] 2016 Yes     donor kidney transplant for DM 16 [Z94.0]  Not Applicable     Chronic      Problems Resolved During this Admission:    Diagnosis Date Noted Date  Resolved POA    Long-term use of immunosuppressant medication [Z79.899]  06/14/2018 Not Applicable       Discharged Condition: stable    Disposition: Home or Self Care    Follow Up:  Follow-up Information     Rishabh Esteban MD In 1 week.    Specialty:  Family Medicine  Contact information:  [facility]  [address]  [place] [address]  [phone]             Donal Mcdonald MD In 2 weeks.    Specialty:  Vascular Surgery  Contact information:  [address]  [place] [address]  [phone]             Katerina Magaña DPM In 1 week.    Specialty:  Podiatry  Contact information:  [id number] [place] [address] [address] [place] [address]  [phone]                 Patient Instructions:   No discharge procedures on file.    Significant Diagnostic Studies: Labs:   BMP:   Recent Labs  Lab 06/14/18  0451 06/15/18  0712   *  242* 184*  184*   *  135* 139  139   K 4.2  4.2 4.0  4.1     103 107  108   CO2 23  22* 21*  21*   BUN 23  21 21  21   CREATININE 1.8*  1.9* 1.8*  1.8*   CALCIUM 9.7  9.7 9.4  9.4   MG 1.8 1.9       Pending Diagnostic Studies:     None         Medications:  Reconciled Home Medications:      Medication List      START taking these medications    amLODIPine 2.5 MG tablet  Commonly known as:  NORVASC  Take 1 tablet (2.5 mg total) by mouth once daily.  Start taking on:  6/16/2018     cephALEXin 500 MG capsule  Commonly known as:  KEFLEX  Take 1 capsule (500 mg total) by mouth every 12 (twelve) hours.     HYDROcodone-acetaminophen 5-325 mg per tablet  Commonly known as:  NORCO  Take 1 tablet by mouth every 6 (six) hours as needed for Pain.        CHANGE how you take these medications    gabapentin 300 MG capsule  Commonly known as:  NEURONTIN  Take one tab bid and 2 at night.  What changed:  · when to take this  · additional instructions        CONTINUE taking these medications    aspirin 81 MG EC tablet  Commonly known as:  ECOTRIN  Take 1 tablet (81 mg total) by  "mouth once daily.     BD INSULIN SYRINGE ULTRA-FINE 1 mL 31 gauge x 5/16 Syrg  Generic drug:  insulin syringe-needle U-100  USE ONE AS DIRECTED FOUR TIMES DAILY WITH MEALS AND AT BEDTIME     blood sugar diagnostic Strp  1 each by Misc.(Non-Drug; Combo Route) route 3 (three) times daily.     blood-glucose meter kit  Use as instructed     budesonide-formoterol 160-4.5 mcg 160-4.5 mcg/actuation Hfaa  Commonly known as:  SYMBICORT  Inhale 2 puffs into the lungs every 12 (twelve) hours. Wash out mouth after using     ergocalciferol 50,000 unit Cap  Commonly known as:  ERGOCALCIFEROL  Take 1 capsule (50,000 Units total) by mouth every 7 days. Take on Mondays     inhalation spacing device  Commonly known as:  BREATHERITE VALVED MDI CHAMBER  Use as directed for inhalation.     insulin  unit/mL injection  Commonly known as:  NovoLIN N NPH U-100 Insulin  Take 25 units subq with breakfast and 15 units at bedtime     lancets Misc  1 each by Misc.(Non-Drug; Combo Route) route 3 (three) times daily.     NovoLIN R Regular U-100 Insuln 100 unit/mL injection  Generic drug:  insulin regular  INJECT 6 UNITS WITH BREAKFAST AND 8 UNITS WITH DINNER, SUBCUTANEOUSLY 30 MIN BEFORE MEAL.     ondansetron 4 MG Tbdl  Commonly known as:  ZOFRAN-ODT  Take 1 tablet (4 mg total) by mouth every 8 (eight) hours as needed.     * pen needle, diabetic 32 gauge x 5/32" Ndle  Commonly known as:  EASY COMFORT PEN NEEDLES  Inject 1 each into the skin 3 (three) times daily.     * pen needle, diabetic 31 gauge x 1/4" Ndle  1 each by Misc.(Non-Drug; Combo Route) route 4 (four) times daily.     * BD ULTRA-FINE SHORT PEN NEEDLE 31 gauge x 5/16" Ndle  Generic drug:  pen needle, diabetic     predniSONE 5 MG tablet  Commonly known as:  DELTASONE  Take 1 tablet (5 mg total) by mouth once daily.     sodium bicarbonate 650 MG tablet  Take 4 tablets (2,600 mg total) by mouth 2 (two) times daily.     tacrolimus 0.5 MG Cap  Commonly known as:  PROGRAF  Take 3 " capsules (1.5 mg total) by mouth every 12 (twelve) hours. Z94.0 Kidney Transplant     zolpidem 5 MG Tab  Commonly known as:  AMBIEN  Take 2 tablets (10 mg total) by mouth nightly as needed.        * This list has 3 medication(s) that are the same as other medications prescribed for you. Read the directions carefully, and ask your doctor or other care provider to review them with you.                Indwelling Lines/Drains at time of discharge:   Lines/Drains/Airways          No matching active lines, drains, or airways          Time spent on the discharge of patient: 40 minutes  Patient was seen and examined on the date of discharge and determined to be suitable for discharge.         Stephanie Mueller MD  Department of Hospital Medicine  [facility]

## 2018-06-15 NOTE — NURSING TRANSFER
Nursing Transfer Note      6/14/2018     Transfer To: 214    Transfer via bed    Transfer with cardiac monitoring    Transported by rocio jones    Medicines sent: none    Chart send with patient: Yes    Notified: no family available    Patient reassessed at: TRANSFERRED TO ROOM. TRANSFERRED TO BED.  TELEMETRY MONITER ON.  IV FLUIDS ON PUMP AT PRESCRIBED RATE.  PROCEDURAL ACCESS SITE WITHOUT BLEEDING OR HEMATOMA.  DRESSING DRY AND INTACT.  CARE HANDED OFF TOana 6/14/18 1930......... (date, time)    Upon arrival to floor: cardiac monitor applied, patient oriented to room, call bell in reach and bed in lowest position

## 2018-06-15 NOTE — SUBJECTIVE & OBJECTIVE
Review of Systems   Constitutional: Negative for activity change and appetite change.   HENT: Negative for congestion and facial swelling.    Eyes: Negative for pain, discharge and redness.   Respiratory: Negative for apnea, cough and chest tightness.    Cardiovascular: Negative for chest pain, palpitations and leg swelling.   Gastrointestinal: Negative for abdominal distention.   Genitourinary: Negative for difficulty urinating, dysuria and frequency.   Musculoskeletal: Negative for neck pain and neck stiffness.   Skin: Positive for wound. Negative for color change and rash.        Dry gangrene on left great toe , purulent collection noted    Neurological: Negative for dizziness, weakness and numbness.   Psychiatric/Behavioral: Negative for sleep disturbance.   All other systems reviewed and are negative.    Objective:     Vital Signs (Most Recent):  Temp: 97.5 °F (36.4 °C) (06/15/18 1651)  Pulse: 87 (06/15/18 1738)  Resp: 17 (06/15/18 0726)  BP: (!) 167/90 (06/15/18 1651)  SpO2: (!) 93 % (06/15/18 1651) Vital Signs (24h Range):  Temp:  [96.4 °F (35.8 °C)-98.4 °F (36.9 °C)] 97.5 °F (36.4 °C)  Pulse:  [74-99] 87  Resp:  [11-20] 17  SpO2:  [90 %-98 %] 93 %  BP: (120-207)/() 167/90     Weight: 101 kg (222 lb 10.6 oz)  Body mass index is 31.06 kg/m².    Intake/Output Summary (Last 24 hours) at 06/15/18 1803  Last data filed at 06/15/18 1209   Gross per 24 hour   Intake           1967.5 ml   Output              420 ml   Net           1547.5 ml      Physical Exam   Constitutional: He is oriented to person, place, and time. He appears well-developed and well-nourished. No distress.   HENT:   Head: Normocephalic and atraumatic.   Eyes: Pupils are equal, round, and reactive to light. Right eye exhibits no discharge.   Neck: Normal range of motion. Neck supple. No tracheal deviation present. No thyromegaly present.   Cardiovascular: Normal rate, regular rhythm, normal heart sounds and intact distal pulses.  Exam  reveals no gallop and no friction rub.    No murmur heard.  Pulmonary/Chest: Effort normal and breath sounds normal. He has no wheezes. He has no rales.   Abdominal: Soft. He exhibits no mass. There is no tenderness. There is no rebound and no guarding.   No allograft tenderness    Musculoskeletal: Normal range of motion. He exhibits no edema.   Left toe gangrene , s/p right BKA    Lymphadenopathy:     He has no cervical adenopathy.   Neurological: He is alert and oriented to person, place, and time.   Skin: Skin is warm. No rash noted. He is not diaphoretic. No erythema.   Nursing note and vitals reviewed.      Significant Labs: All pertinent labs within the past 24 hours have been reviewed.    Significant Imaging: I have reviewed all pertinent imaging results/findings within the past 24 hours.

## 2018-06-15 NOTE — PLAN OF CARE
Problem: Patient Care Overview  Goal: Plan of Care Review  Outcome: Ongoing (interventions implemented as appropriate)  POC reviewed with patient, verbalized understanding. Pt remains free from falls. Fall precautions in place. NS on cardiac monitor. VSS. No other complaints at this time. Call bell w/in reach. Reminded to call for assistance. Pt off of bedrest at 2300 and has been up since the 2 hour charissa at 45 degrees. Groin site intact and no active bleeding or hematoma. Left foot has been slightly warmer just underneath the toes. And pulse is palpable without the doppler. A weak pulse but it is present. Pain controlled with PO pain medication. Pt prosthetic by the bedside.

## 2018-06-15 NOTE — PLAN OF CARE
Problem: Patient Care Overview  Goal: Plan of Care Review  Outcome: Ongoing (interventions implemented as appropriate)  Patient 's spo2 and breathing are stable in room air.

## 2018-06-15 NOTE — PROGRESS NOTES
[facility] -   Nephrology  Progress Note    Patient Name: Lavelle Ladd  MRN: [id number]  Admission Date: 2018  Hospital Length of Stay: 3 days  Attending Provider: Stephanie Mueller MD   Primary Care Physician: Rishabh Esteban MD  Principal Problem:Open wound of left great toe    Subjective:     HPI: Lavelle Ladd is a pleasant 62 y old  man  who received a  - brain death kidney transplant on 16.  He has CKD stage 3 - GFR 30-59 and his baseline creatinine is between 1.6-1.8. He takes  prednisone and tacrolimus for maintenance immunosuppression.  MMF was stopped few months ago , s/p right BKA , has dry gangrene on his left great toe for few days, now developed purulent discharge, admitted for the same , Podiatry consulted , renal fn at his baseline ,        Interval History:  No acute events , s/p angiogram,.     Review of patient's allergies indicates: No Known Allergies    Current Facility-Administered Medications   Medication Frequency    0.45% NaCl infusion Continuous    0.9%  NaCl infusion Continuous    acetaminophen tablet 650 mg Q6H PRN    albuterol-ipratropium 2.5 mg-0.5 mg/3 mL nebulizer solution 3 mL Q6H PRN    amLODIPine tablet 2.5 mg Daily    arformoterol nebulizer solution 15 mcg Q12H    aspirin EC tablet 81 mg Daily    budesonide nebulizer solution 0.5 mg Q12H    dextrose 50% injection 12.5 g PRN    dextrose 50% injection 25 g PRN    gabapentin capsule 100 mg TID    glucagon (human recombinant) injection 1 mg PRN    glucose chewable tablet 16 g PRN    glucose chewable tablet 24 g PRN    hydrALAZINE injection 10 mg Q6H PRN    HYDROcodone-acetaminophen 5-325 mg per tablet 1 tablet Q4H PRN    insulin aspart U-100 pen 0-5 Units QID (AC + HS) PRN    insulin detemir U-100 pen 5 Units QHS    morphine injection 3 mg Q1H PRN    ondansetron disintegrating tablet 8 mg Q8H PRN    oxyCODONE-acetaminophen 5-325 mg per tablet 2 tablet Q6H PRN     piperacillin-tazobactam 4.5 g in dextrose 5 % 100 mL IVPB (ready to mix system) Q8H    potassium, sodium phosphates 280-160-250 mg packet 1 packet BID    predniSONE tablet 5 mg Daily    sodium bicarbonate tablet 2,600 mg BID    tacrolimus capsule 1.5 mg BID    vancomycin (VANCOCIN) 1,250 mg in sodium chloride 0.9% 250 mL IVPB Q18H    zolpidem tablet 5 mg Nightly PRN       Objective:     Vital Signs (Most Recent):  Temp: 98.3 °F (36.8 °C) (06/15/18 1247)  Pulse: 99 (06/15/18 1314)  Resp: 17 (06/15/18 0726)  BP: 126/73 (06/15/18 1247)  SpO2: (!) 90 % (06/15/18 1247)  O2 Device (Oxygen Therapy): room air (06/15/18 1247) Vital Signs (24h Range):  Temp:  [96.2 °F (35.7 °C)-98.4 °F (36.9 °C)] 98.3 °F (36.8 °C)  Pulse:  [74-99] 99  Resp:  [11-20] 17  SpO2:  [90 %-99 %] 90 %  BP: (120-207)/() 126/73     Weight: 101 kg (222 lb 10.6 oz) (06/15/18 0533)  Body mass index is 31.06 kg/m².  Body surface area is 2.25 meters squared.    I/O last 3 completed shifts:  In: 1210 [P.O.:210; I.V.:550; IV Piggyback:450]  Out: 1300 [Urine:1300]    Physical Exam   Constitutional: He is oriented to person, place, and time. He appears well-developed and well-nourished. No distress.   HENT:   Head: Normocephalic and atraumatic.   Eyes: Pupils are equal, round, and reactive to light. Right eye exhibits no discharge.   Neck: Normal range of motion. Neck supple. No tracheal deviation present. No thyromegaly present.   Cardiovascular: Normal rate, regular rhythm, normal heart sounds and intact distal pulses.  Exam reveals no gallop and no friction rub.    No murmur heard.  Pulmonary/Chest: Effort normal and breath sounds normal. He has no wheezes. He has no rales.   Abdominal: Soft. He exhibits no mass. There is no tenderness. There is no rebound and no guarding.   No allograft tenderness    Musculoskeletal: Normal range of motion. He exhibits no edema.   Left toe gangrene , s/p right BKA    Lymphadenopathy:     He has no cervical  adenopathy.   Neurological: He is alert and oriented to person, place, and time.   Skin: Skin is warm. No rash noted. He is not diaphoretic. No erythema.   Nursing note and vitals reviewed.      Significant Labs:  CBC:     Recent Labs  Lab 06/15/18  0712   WBC 6.54   RBC 5.07   HGB 15.8   HCT 48.0      MCV 95   MCH 31.2*   MCHC 32.9     CMP:   Recent Labs  Lab 18  0901  06/15/18  0712   *  < > 184*  184*   CALCIUM 9.4  < > 9.4  9.4   ALBUMIN 3.5  < > 3.4*   PROT 7.0  --   --      < > 139  139   K 4.4  < > 4.0  4.1   CO2 23  < > 21*  21*     < > 107  108   BUN 23  < > 21  21   CREATININE 1.7*  < > 1.8*  1.8*   ALKPHOS 73  --   --    ALT 10  --   --    AST 17  --   --    BILITOT 0.4  --   --    < > = values in this interval not displayed.  Coagulation: No results for input(s): PT, INR, APTT in the last 168 hours.  LFTs:   Recent Labs  Lab 18  0901  06/15/18  0712   ALT 10  --   --    AST 17  --   --    ALKPHOS 73  --   --    BILITOT 0.4  --   --    PROT 7.0  --   --    ALBUMIN 3.5  < > 3.4*   < > = values in this interval not displayed.     All labs within the past 24 hours have been reviewed.     Significant Imaging: reviewed     Assessment/Plan:      donor kidney transplant for DM 16    1. S/p DDRT in 2016 : stable allograft fn , cont Prograf 1.5 mg bid and Pred 5 mg daily ,    2. HTN : BP controlled,     3. Left toe gangrene,  on broad spectrum abx , reviewed Vascular and Podiatry notes,    S/p angiogram , will stop IV fluids at this point,     4. Metabolic acidosis : on  Bicarb supplements     5. Anemia of CKD : stable Hb     6. Replete Phos              I will follow-up with patient. Please contact us if you have any additional questions.     Total time spent 40 minutes including time needed to review the records,  patient  evaluation, documentation, face-to-face discussion with the patient,  primary team ,   more than 50% of the time was spent on  coordination of care and counseling.       Carlos Staley MD  Nephrology  [facility]

## 2018-06-15 NOTE — PROGRESS NOTES
[facility] Hospital Medicine  Progress Note    Patient Name: Lavelle Ladd  MRN: [id number]  Patient Class: IP- Inpatient   Admission Date: 6/12/2018  Length of Stay: 3 days  Attending Physician: Stephanie Mueller MD  Primary Care Provider: Rishabh Esteban MD        Subjective:     Principal Problem:Open wound of left great toe    HPI:  Lavelle Ladd is a 62 year old male with type II diabetes mellitus, diabetic neuropathy, chronic back pain, right above the knee amputation and history of kidney transplant on 5/21/16, on Prograf and prednisone, who presents to the ED for an infected wound on his left distal hallux. The patient states he injured his toe while cutting his toe nail approximately three weeks ago. He initially presented to the ED on 6/1/18, a week after the initial injury. In the ED, the ecchymotic tissue on the toe was debrided and he was sent home on clindamycin. The patient states he finished the clindamycin regimen on 6/9/18. He reports that he has a Podiatrist appointment tomorrow 6/13/18. However, he came in today because his foot began draining this morning. The patient has additional complaints of diffuse left foot pain, but he has decreased sensation at the site of his wound. He denies any fever, chills or known history of peripheral artery disease. He does express that he generally does not feel well and has chronic back pain. In the ED, purulent drainage was noted from his wound by the ED physician. His WBC count is within normal limits. His creatinine is at his baseline of 1.7. He has a mildly elevated ESR of 11, but CRP is normal. US of the left lower extremity is significant for evidence of peripheral vascular disease of the left lower extremity arterial system with variable plaque, probable 50% stenosis of the distal left SFA and monophasic waveforms within the anterior tibial, posterior tibial and peroneal arteries of the left calf. X-ray of the left foot was negative  for osteomyelitis.     Hospital Course:  6/13  Patient complaining of pain in toe . Waiting on vascular recommendation . Continue with iv antibotics   6/14  Patient will have arteriogram tomorrow         Review of Systems   Constitutional: Negative for activity change and appetite change.   HENT: Negative for congestion and facial swelling.    Eyes: Negative for pain, discharge and redness.   Respiratory: Negative for apnea, cough and chest tightness.    Cardiovascular: Negative for chest pain, palpitations and leg swelling.   Gastrointestinal: Negative for abdominal distention.   Genitourinary: Negative for difficulty urinating, dysuria and frequency.   Musculoskeletal: Negative for neck pain and neck stiffness.   Skin: Positive for wound. Negative for color change and rash.        Dry gangrene on left great toe , purulent collection noted    Neurological: Negative for dizziness, weakness and numbness.   Psychiatric/Behavioral: Negative for sleep disturbance.   All other systems reviewed and are negative.    Objective:     Vital Signs (Most Recent):  Temp: 97.5 °F (36.4 °C) (06/15/18 1651)  Pulse: 87 (06/15/18 1738)  Resp: 17 (06/15/18 0726)  BP: (!) 167/90 (06/15/18 1651)  SpO2: (!) 93 % (06/15/18 1651) Vital Signs (24h Range):  Temp:  [96.2 °F (35.7 °C)-98.4 °F (36.9 °C)] 97.5 °F (36.4 °C)  Pulse:  [74-99] 87  Resp:  [11-20] 17  SpO2:  [90 %-99 %] 93 %  BP: (120-207)/() 167/90     Weight: 101 kg (222 lb 10.6 oz)  Body mass index is 31.06 kg/m².    Intake/Output Summary (Last 24 hours) at 06/15/18 1739  Last data filed at 06/15/18 1209   Gross per 24 hour   Intake           1967.5 ml   Output              420 ml   Net           1547.5 ml      Physical Exam   Constitutional: He is oriented to person, place, and time. He appears well-developed and well-nourished. No distress.   HENT:   Head: Normocephalic and atraumatic.   Eyes: Pupils are equal, round, and reactive to light. Right eye exhibits no discharge.    Neck: Normal range of motion. Neck supple. No tracheal deviation present. No thyromegaly present.   Cardiovascular: Normal rate, regular rhythm, normal heart sounds and intact distal pulses.  Exam reveals no gallop and no friction rub.    No murmur heard.  Pulmonary/Chest: Effort normal and breath sounds normal. He has no wheezes. He has no rales.   Abdominal: Soft. He exhibits no mass. There is no tenderness. There is no rebound and no guarding.   No allograft tenderness    Musculoskeletal: Normal range of motion. He exhibits no edema.   Left toe gangrene , s/p right BKA    Lymphadenopathy:     He has no cervical adenopathy.   Neurological: He is alert and oriented to person, place, and time.   Skin: Skin is warm. No rash noted. He is not diaphoretic. No erythema.   Nursing note and vitals reviewed.      Significant Labs: All pertinent labs within the past 24 hours have been reviewed.    Significant Imaging: I have reviewed all pertinent imaging results/findings within the past 24 hours.    Assessment/Plan:      * Open wound of left great toe    -Continue IV vancomycin and Zosyn.   -MRI of left foot.   -Consult Podiatry.   -NPO after midnight for possible Podiatry intervention.          PVD (peripheral vascular disease)    -Vascular surgery consult.   -Continue ASA.         Type 2 diabetes mellitus with diabetic neuropathy, with long-term current use of insulin    -Levemir and NISS.   -Diabetic diet.   -Continue gabapentin.           Chronic obstructive pulmonary disease    Continue inhaled medications.            donor kidney transplant for DM 16    -Continue prednisone and Prograf, per Nephrology.  -Monitor.           VTE Risk Mitigation         Ordered     IP VTE HIGH RISK PATIENT  Once      18 1606     Place sequential compression device  Until discontinued      18 1044              Stephanie Mueller MD  Department of Hospital Medicine   [facility]

## 2018-06-15 NOTE — SUBJECTIVE & OBJECTIVE
Interval History:  No acute events , s/p angiogram,.     Review of patient's allergies indicates: No Known Allergies    Current Facility-Administered Medications   Medication Frequency    0.45% NaCl infusion Continuous    0.9%  NaCl infusion Continuous    acetaminophen tablet 650 mg Q6H PRN    albuterol-ipratropium 2.5 mg-0.5 mg/3 mL nebulizer solution 3 mL Q6H PRN    amLODIPine tablet 2.5 mg Daily    arformoterol nebulizer solution 15 mcg Q12H    aspirin EC tablet 81 mg Daily    budesonide nebulizer solution 0.5 mg Q12H    dextrose 50% injection 12.5 g PRN    dextrose 50% injection 25 g PRN    gabapentin capsule 100 mg TID    glucagon (human recombinant) injection 1 mg PRN    glucose chewable tablet 16 g PRN    glucose chewable tablet 24 g PRN    hydrALAZINE injection 10 mg Q6H PRN    HYDROcodone-acetaminophen 5-325 mg per tablet 1 tablet Q4H PRN    insulin aspart U-100 pen 0-5 Units QID (AC + HS) PRN    insulin detemir U-100 pen 5 Units QHS    morphine injection 3 mg Q1H PRN    ondansetron disintegrating tablet 8 mg Q8H PRN    oxyCODONE-acetaminophen 5-325 mg per tablet 2 tablet Q6H PRN    piperacillin-tazobactam 4.5 g in dextrose 5 % 100 mL IVPB (ready to mix system) Q8H    potassium, sodium phosphates 280-160-250 mg packet 1 packet BID    predniSONE tablet 5 mg Daily    sodium bicarbonate tablet 2,600 mg BID    tacrolimus capsule 1.5 mg BID    vancomycin (VANCOCIN) 1,250 mg in sodium chloride 0.9% 250 mL IVPB Q18H    zolpidem tablet 5 mg Nightly PRN       Objective:     Vital Signs (Most Recent):  Temp: 98.3 °F (36.8 °C) (06/15/18 1247)  Pulse: 99 (06/15/18 1314)  Resp: 17 (06/15/18 0726)  BP: 126/73 (06/15/18 1247)  SpO2: (!) 90 % (06/15/18 1247)  O2 Device (Oxygen Therapy): room air (06/15/18 1247) Vital Signs (24h Range):  Temp:  [96.2 °F (35.7 °C)-98.4 °F (36.9 °C)] 98.3 °F (36.8 °C)  Pulse:  [74-99] 99  Resp:  [11-20] 17  SpO2:  [90 %-99 %] 90 %  BP: (120-207)/() 126/73      Weight: 101 kg (222 lb 10.6 oz) (06/15/18 0533)  Body mass index is 31.06 kg/m².  Body surface area is 2.25 meters squared.    I/O last 3 completed shifts:  In: 1210 [P.O.:210; I.V.:550; IV Piggyback:450]  Out: 1300 [Urine:1300]    Physical Exam   Constitutional: He is oriented to person, place, and time. He appears well-developed and well-nourished. No distress.   HENT:   Head: Normocephalic and atraumatic.   Eyes: Pupils are equal, round, and reactive to light. Right eye exhibits no discharge.   Neck: Normal range of motion. Neck supple. No tracheal deviation present. No thyromegaly present.   Cardiovascular: Normal rate, regular rhythm, normal heart sounds and intact distal pulses.  Exam reveals no gallop and no friction rub.    No murmur heard.  Pulmonary/Chest: Effort normal and breath sounds normal. He has no wheezes. He has no rales.   Abdominal: Soft. He exhibits no mass. There is no tenderness. There is no rebound and no guarding.   No allograft tenderness    Musculoskeletal: Normal range of motion. He exhibits no edema.   Left toe gangrene , s/p right BKA    Lymphadenopathy:     He has no cervical adenopathy.   Neurological: He is alert and oriented to person, place, and time.   Skin: Skin is warm. No rash noted. He is not diaphoretic. No erythema.   Nursing note and vitals reviewed.      Significant Labs:  CBC:     Recent Labs  Lab 06/15/18  0712   WBC 6.54   RBC 5.07   HGB 15.8   HCT 48.0      MCV 95   MCH 31.2*   MCHC 32.9     CMP:   Recent Labs  Lab 06/12/18  0901  06/15/18  0712   *  < > 184*  184*   CALCIUM 9.4  < > 9.4  9.4   ALBUMIN 3.5  < > 3.4*   PROT 7.0  --   --      < > 139  139   K 4.4  < > 4.0  4.1   CO2 23  < > 21*  21*     < > 107  108   BUN 23  < > 21  21   CREATININE 1.7*  < > 1.8*  1.8*   ALKPHOS 73  --   --    ALT 10  --   --    AST 17  --   --    BILITOT 0.4  --   --    < > = values in this interval not displayed.  Coagulation: No results for  input(s): PT, INR, APTT in the last 168 hours.  LFTs:   Recent Labs  Lab 06/12/18  0901  06/15/18  0712   ALT 10  --   --    AST 17  --   --    ALKPHOS 73  --   --    BILITOT 0.4  --   --    PROT 7.0  --   --    ALBUMIN 3.5  < > 3.4*   < > = values in this interval not displayed.     All labs within the past 24 hours have been reviewed.     Significant Imaging: reviewed

## 2018-06-15 NOTE — PROGRESS NOTES
[facility] -   Vascular Surgery  Progress Note    Patient Name: Lavelle Ladd  MRN: [id number]  Admission Date: 6/12/2018  Primary Care Provider: Rishabh Esteban MD    Subjective:     Interval History: pod 1 left leg arthrectomy with successful in line flow to foot now.     Post-Op Info:  Procedure(s) (LRB):  LEFT LEG ANGIOGRAM (N/A)   1 Day Post-Op      Medications:  Continuous Infusions:   sodium chloride 0.45%      sodium chloride 0.9% 50 mL/hr at 06/14/18 1911     Scheduled Meds:   amLODIPine  2.5 mg Oral Daily    arformoterol  15 mcg Nebulization Q12H    aspirin  81 mg Oral Daily    budesonide  0.5 mg Nebulization Q12H    gabapentin  100 mg Oral TID    insulin detemir U-100  5 Units Subcutaneous QHS    piperacillin-tazobactam (ZOSYN) IVPB  4.5 g Intravenous Q8H    predniSONE  5 mg Oral Daily    sodium bicarbonate  2,600 mg Oral BID    tacrolimus  1.5 mg Oral BID    vancomycin (VANCOCIN) IVPB  1,250 mg Intravenous Q18H     PRN Meds:acetaminophen, albuterol-ipratropium, dextrose 50%, dextrose 50%, glucagon (human recombinant), glucose, glucose, hydrALAZINE, HYDROcodone-acetaminophen, insulin aspart U-100, morphine, ondansetron, oxyCODONE-acetaminophen, zolpidem     Objective:     Vital Signs (Most Recent):  Temp: 98.4 °F (36.9 °C) (06/15/18 0726)  Pulse: 91 (06/15/18 0915)  Resp: 17 (06/15/18 0726)  BP: 120/68 (06/15/18 0726)  SpO2: (!) 92 % (06/15/18 0726) Vital Signs (24h Range):  Temp:  [96.2 °F (35.7 °C)-98.4 °F (36.9 °C)] 98.4 °F (36.9 °C)  Pulse:  [74-96] 91  Resp:  [11-20] 17  SpO2:  [92 %-99 %] 92 %  BP: (120-207)/() 120/68          Physical Exam    Significant Labs:  All pertinent labs from the last 24 hours have been reviewed.    Significant Diagnostics:  I have reviewed all pertinent imaging results/findings within the past 24 hours.    Assessment/Plan:     Active Diagnoses:    Diagnosis Date Noted POA    PRINCIPAL PROBLEM:  Open wound of left great toe [S91.102A]  2018 Yes    Gangrene [I96] 2018 Yes    PVD (peripheral vascular disease) [I73.9] 2017 Yes     Chronic    Type 2 diabetes mellitus with diabetic neuropathy, with long-term current use of insulin [E11.40, Z79.4] 2016 Not Applicable    Chronic obstructive pulmonary disease [J44.9] 2016 Yes     donor kidney transplant for DM 16 [Z94.0]  Not Applicable     Chronic      Problems Resolved During this Admission:    Diagnosis Date Noted Date Resolved POA    Long-term use of immunosuppressant medication [Z79.899]  2018 Not Applicable   now has good flow to foot.  No further vascular issues.  Should heal with wound care.  Please have him follow up with Dr. Mcdonald in 2 weeks.    thx for consult    Ilan Rivera MD  Vascular Surgery  [facility]

## 2018-06-15 NOTE — SUBJECTIVE & OBJECTIVE
Review of Systems   Constitutional: Negative for activity change and appetite change.   HENT: Negative for congestion and facial swelling.    Eyes: Negative for pain, discharge and redness.   Respiratory: Negative for apnea, cough and chest tightness.    Cardiovascular: Negative for chest pain, palpitations and leg swelling.   Gastrointestinal: Negative for abdominal distention.   Genitourinary: Negative for difficulty urinating, dysuria and frequency.   Musculoskeletal: Negative for neck pain and neck stiffness.   Skin: Positive for wound. Negative for color change and rash.        Dry gangrene on left great toe , purulent collection noted    Neurological: Negative for dizziness, weakness and numbness.   Psychiatric/Behavioral: Negative for sleep disturbance.   All other systems reviewed and are negative.    Objective:     Vital Signs (Most Recent):  Temp: 97.5 °F (36.4 °C) (06/15/18 1651)  Pulse: 87 (06/15/18 1738)  Resp: 17 (06/15/18 0726)  BP: (!) 167/90 (06/15/18 1651)  SpO2: (!) 93 % (06/15/18 1651) Vital Signs (24h Range):  Temp:  [96.2 °F (35.7 °C)-98.4 °F (36.9 °C)] 97.5 °F (36.4 °C)  Pulse:  [74-99] 87  Resp:  [11-20] 17  SpO2:  [90 %-99 %] 93 %  BP: (120-207)/() 167/90     Weight: 101 kg (222 lb 10.6 oz)  Body mass index is 31.06 kg/m².    Intake/Output Summary (Last 24 hours) at 06/15/18 1739  Last data filed at 06/15/18 1209   Gross per 24 hour   Intake           1967.5 ml   Output              420 ml   Net           1547.5 ml      Physical Exam   Constitutional: He is oriented to person, place, and time. He appears well-developed and well-nourished. No distress.   HENT:   Head: Normocephalic and atraumatic.   Eyes: Pupils are equal, round, and reactive to light. Right eye exhibits no discharge.   Neck: Normal range of motion. Neck supple. No tracheal deviation present. No thyromegaly present.   Cardiovascular: Normal rate, regular rhythm, normal heart sounds and intact distal pulses.  Exam  reveals no gallop and no friction rub.    No murmur heard.  Pulmonary/Chest: Effort normal and breath sounds normal. He has no wheezes. He has no rales.   Abdominal: Soft. He exhibits no mass. There is no tenderness. There is no rebound and no guarding.   No allograft tenderness    Musculoskeletal: Normal range of motion. He exhibits no edema.   Left toe gangrene , s/p right BKA    Lymphadenopathy:     He has no cervical adenopathy.   Neurological: He is alert and oriented to person, place, and time.   Skin: Skin is warm. No rash noted. He is not diaphoretic. No erythema.   Nursing note and vitals reviewed.      Significant Labs: All pertinent labs within the past 24 hours have been reviewed.    Significant Imaging: I have reviewed all pertinent imaging results/findings within the past 24 hours.

## 2018-06-15 NOTE — PLAN OF CARE
Problem: Patient Care Overview  Goal: Plan of Care Review  Outcome: Ongoing (interventions implemented as appropriate)  Pt AAO x4.  VSS.  Pt remained afebrile throughout this shift.   IV fluids and abx administered per order.   Pt remained free of falls this shift.   Pt complains of pain this shift.  Plan of care reviewed. Patient verbalizes understanding.   Pain meds administered as ordered.   Pt moving/turing independently.   Patient SR on monitor.   Blood glucose monitoring.  Bed low, side rails up x 2, wheels locked, call light in reach.   Patient instructed to call for assistance.   Hourly rounding completed.   Will continue to monitor.

## 2018-06-15 NOTE — CONSULTS
Pharmacy Vancomycin Consult Note    WBC=6.54  Tmax=98.4  CrCl=50.2ml/min Scr=1.8    Cultures: NGTD  Dx. Open wound of left great toe  Goal trough=10-15mcg/ml    VAncomycin trough=18.4mcg/ml (previous trough at same dose=11.6mcg/ml)  Rn gave next dose.  Will decrease dosing interval to Vancomycin 1250mg Q24.   Trough due 6/16 @1700    Thank you for allowing us to participate in this patient's care.  Amy Rome, Pharm D 6/15/2018 4:34 PM

## 2018-06-15 NOTE — PLAN OF CARE
Problem: Patient Care Overview  Goal: Plan of Care Review  Outcome: Ongoing (interventions implemented as appropriate)  Plan of care discussed with patient, and patient verbalized understanding.  Patient remained AOx4, room air, and NSR on the monitor.  Patient remained free of falls, accidents, and trama during the day shift.  Bed is in the lowest position and call light is within reach - Continue to monitor.

## 2018-06-15 NOTE — PROGRESS NOTES
Clinical Pharmacy Progress Note: Vancomycin Dosing     Vancomycin Day # 4  Estimated Creatinine Clearance: 50.2 mL/min (A) (based on SCr of 1.8 mg/dL (H)).   SCr trend: (stable @ 1.8 x 2 days )   Lab Results   Component Value Date    WBC 6.54 06/15/2018     WBC trend: (6.54 wnl)         Tmax/24h: 98.4 F  Goal trough: 10-15  Indication:   Open wound of left great toe/Hx-DM2  Cultures: Blood and wound cxs -ngtd  Next level due:  6/15 @ 1530    Thank you for allowing us to participate in this patient's care.    Mari Amaya [facility] 6/15/2018 11:22 AM

## 2018-06-16 NOTE — PLAN OF CARE
Jun21 EMG - 2 Limbs with Samantha Restrepo MD   Thursday Jun 21, 2018 9:00 AM  [facility] - [facility]   [address] [facility] [address] [place] [address]   [phone]    Jun22 Established Patient Visit with Kary Berrios MD   Friday Jun 22, 2018 9:30 AM  [facility] - Neurosurgery   [address] [place] [address] Dr.  Second Floor  [place] [address]   [phone]    Jul3 New Patient - Pain with Mumtaz Menchaca MD   Tuesday Jul 3, 2018 9:20 AM  [facility]   [address] [facility] [address] [place] [address]   [phone]    Jul9 Established Patient Visit with Katerina Magaña DPM   Monday Jul 9, 2018 8:40 AM  [facility] - Podiatry   [address] [facility] [place] [address]   [phone]         06/16/18 1549   Final Note   Assessment Type Final Discharge Note   Discharge Disposition Home   Hospital Follow Up  Appt(s) scheduled? Yes   Right Care Referral Info   Post Acute Recommendation No Care

## 2018-06-16 NOTE — NURSING
Discharged orders received and reviewed with family and pt. Pt instructed when to take each medication next dose. IV removed, telemetry removed. Pt assisted with dressing by staff. Pt transported to [facility] via w/c by staff for family to transport home.

## 2018-06-17 LAB
BACTERIA BLD CULT: NORMAL
BACTERIA BLD CULT: NORMAL

## 2018-06-18 NOTE — OP NOTE
DATE OF PROCEDURE:  06/14/2018    PREOPERATIVE DIAGNOSIS:  Peripheral vascular disease, nonhealing toe ulcer.    POSTOPERATIVE DIAGNOSIS:  Peripheral vascular disease, nonhealing toe ulcer.    ATTENDING SURGEON:  Donal Mcdonald M.D.    ANESTHESIA:  Conscious sedation with local.    PROCEDURE:  Right common femoral artery puncture, aortogram with CO2 left lower   extremity runoff, left lower extremity, third order tibial and posterior tibial   artery atherectomy with a balloon angioplasty and a left SFA atherectomy with   balloon angioplasty.    COMPLICATIONS:  None.    SPECIMENS:  None.    MEDICATIONS GIVEN:  IV heparin as well as nitro intra-arterially.    INDICATIONS:  A 62-year-old male with a history of remote kidney injury.  He has   known peripheral vascular disease.  He had a toenail removed on the left foot   several weeks ago and developed an infection was found to have significant   peripheral vascular disease and presents now for the above procedure.    PROCEDURE IN DETAIL:  After discussion with the patient regarding risks,   benefits, and potential complication, he agreed and signed informed consent.  He   was brought to the specialist procedure room and placed in supine position.    Then, after adequate conscious sedation, we prepped and draped the patient's   bilateral groins in the standard surgical fashion.  At this point, I cannulated   the right common femoral artery using a micropuncture needle, placing a metal   guidewire upsized to a 5-[place] introducer sheath and passed 0.035 guidewire   into the aorta and advanced pigtail catheter over the wire, I shot aortoiliac   CO2 angiogram, which revealed no significant aortoiliac disease despite   calcified vessels.  We then selectively cannulated the contralateral iliac limb   positioned the catheter in the left lower extremity.  We then shot a left lower   extremity CO2 runoff angiogram.    RADIOLOGIC FINDINGS:  The patient had a widely patent  calcified common femoral   and deep femoral arteries, superficial femoral artery was widely patent.  There   was a focal calcific plaque in the mid and distal SFA.  There appeared to be a   patent popliteal with a patent tibioperoneal trunk.  The origins of the anterior   tibial, posterior tibial and peroneal were patent.  The peroneal continued down   to the mid calf.  The posterior tibial occluded for a long segment and then   reconstituted at the ankle.  The anterior tibial also had a mid segment short   segmental chronic total occlusion with chronic stenosis.  At this point, we   heparinized the patient fully and advanced a 6-Citizen of [place] up and over catheter and   parked in the left mid SFA.  We then selectively cannulated in a third order   fashion the left popliteal artery and then advanced our 0.035 Glidewire into the   posterior tibial artery.  We then downsized to 0.014  tip cath wire and   advanced the guidewire and positioned it in the distal posterior tibial artery   near the ankle.  We then performed third order tibial atherectomy using a CSI at   1.8 bullet.  After multiple passes, we postdilated after the atherectomy and   performed balloon angioplasty using a 2.5 x 300 balloon.  Subsequent images   revealed improvement in flow with only mild residual stenosis.  The segment of   chronic total occlusion appeared to be patent and there was flow into the foot.    At this point, we backed our 6-Citizen of [place] destination catheter back into the mid   SFA and then performed a mid distal SFA atherectomy of the focal calcific   lesion.  We used a 2.2 CSI device and then this was postdilated with a 6 x 40 mm   chocolate balloon and subsequent images revealed improvement in flow with only   mild residual stenosis.  At this point, no further interventions were   undertaken.  All catheters and wires removed from the groin.  Direct manual   pressure was held for approximately 30 minutes.  The patient tolerated procedure   and  transferred to Recovery Room with stable vital signs.      VT/HN  dd: 06/14/2018 17:28:47 (CDT)  td: 06/14/2018 18:40:53 (CDT)  Doc ID   #[id number]  Job ID #[id number]    CC:

## 2018-06-20 LAB — POCT GLUCOSE: 193 MG/DL (ref 70–110)

## 2018-06-21 ENCOUNTER — OFFICE VISIT (OUTPATIENT)
Dept: PHYSICAL MEDICINE AND REHAB | Facility: CLINIC | Age: 63
End: 2018-06-21
Payer: MEDICARE

## 2018-06-21 ENCOUNTER — TELEPHONE (OUTPATIENT)
Dept: TRANSPLANT | Facility: CLINIC | Age: 63
End: 2018-06-21

## 2018-06-21 VITALS
DIASTOLIC BLOOD PRESSURE: 85 MMHG | RESPIRATION RATE: 14 BRPM | HEART RATE: 91 BPM | WEIGHT: 222 LBS | BODY MASS INDEX: 31.08 KG/M2 | HEIGHT: 71 IN | SYSTOLIC BLOOD PRESSURE: 142 MMHG

## 2018-06-21 DIAGNOSIS — M54.16 ACUTE LUMBAR RADICULOPATHY: Primary | ICD-10-CM

## 2018-06-21 DIAGNOSIS — M48.062 LUMBAR STENOSIS WITH NEUROGENIC CLAUDICATION: ICD-10-CM

## 2018-06-21 DIAGNOSIS — M54.16 CHRONIC LUMBAR RADICULOPATHY: ICD-10-CM

## 2018-06-21 PROCEDURE — 99204 OFFICE O/P NEW MOD 45 MIN: CPT | Mod: 25,S$GLB,, | Performed by: PHYSICAL MEDICINE & REHABILITATION

## 2018-06-21 PROCEDURE — 99999 PR PBB SHADOW E&M-EST. PATIENT-LVL III: CPT | Mod: PBBFAC,,, | Performed by: PHYSICAL MEDICINE & REHABILITATION

## 2018-06-21 PROCEDURE — 3008F BODY MASS INDEX DOCD: CPT | Mod: CPTII,S$GLB,, | Performed by: PHYSICAL MEDICINE & REHABILITATION

## 2018-06-21 PROCEDURE — 3077F SYST BP >= 140 MM HG: CPT | Mod: CPTII,S$GLB,, | Performed by: PHYSICAL MEDICINE & REHABILITATION

## 2018-06-21 PROCEDURE — 3079F DIAST BP 80-89 MM HG: CPT | Mod: CPTII,S$GLB,, | Performed by: PHYSICAL MEDICINE & REHABILITATION

## 2018-06-21 PROCEDURE — 95886 MUSC TEST DONE W/N TEST COMP: CPT | Mod: S$GLB,,, | Performed by: PHYSICAL MEDICINE & REHABILITATION

## 2018-06-21 PROCEDURE — 95908 NRV CNDJ TST 3-4 STUDIES: CPT | Mod: S$GLB,,, | Performed by: PHYSICAL MEDICINE & REHABILITATION

## 2018-06-21 NOTE — TELEPHONE ENCOUNTER
----- Message from Daisy Woods sent at 6/20/2018  8:18 AM CDT -----  Contact: pt  Pt calling bc he has clear drainage from toe     Advised by Kavita Laguna  To take msg and she will call pt back once she pulls his chart    Pt contact [phone]

## 2018-06-21 NOTE — PATIENT INSTRUCTIONS
Exercises to Strengthen Your Lower Back  Strong lower back and abdominal muscles work together to support your spine. The exercises below will help strengthen the lower back. It is important that you begin exercising slowly and increase levels gradually.  Always begin any exercise program with stretching. If you feel pain while doing any of these exercises, stop and talk to your doctor about a more specific exercise program that better suits your condition.   Low back stretch  The point of stretching is to make you more flexible and increase your range of motion. Stretch only as much as you are able. Stretch slowly. Do not push your stretch to the limit. If at any point you feel pain while stretching, this is your (temporary) limit.  · Lie on your back with your knees bent and both feet on the ground.  · Slowly raise your left knee to your chest as you flatten your lower back against the floor. Hold for 5 seconds.  · Relax and repeat the exercise with your right knee.  · Do 10 of these exercises for each leg.  · Repeat hugging both knees to your chest at the same time.  Building lower back strength  Start your exercise routine with 10 to 30 minutes a day, 1 to 3 times a day.  Initial exercises  Lying on your back:  1. Ankle pumps: Move your foot up and down, towards your head, and then away. Repeat 10 times with each foot.  2. Heel slides: Slowly bend your knee, drawing the heel of your foot towards you. Then slide your heel/foot from you, straightening your knee. Do not lift your foot off the floor (this is not a leg lift).  3. Abdominal contraction: Bend your knees and put your hands on your stomach. Tighten your stomach muscles. Hold for 5 seconds, then relax. Repeat 10 times.  4. Straight leg raise: Bend one leg at the knee and keep the other leg straight. Tighten your stomach muscles. Slowly lift your straight leg 6 to 12 inches off the floor and hold for up to 5 seconds. Repeat 10 times on each  side.  Standin. Wall squats: Stand with your back against the wall. Move your feet about 12 inches away from the wall. Tighten your stomach muscles, and slowly bend your knees until they are at about a 45 degree angle. Do not go down too far. Hold about 5 seconds. Then slowly return to your starting position. Repeat 10 times.  2. Heel raises: Stand facing the wall. Slowly raise the heels of your feet up and down, while keeping your toes on the floor. If you have trouble balancing, you can touch the wall with your hands. Repeat 10 times.  More advanced exercises  When you feel comfortable enough, try these exercises.  1. Kneeling lumbar extension: Begin on your hands and knees. At the same time, raise and straighten your right arm and left leg until they are parallel to the ground. Hold for 2 seconds and come back slowly to a starting position. Repeat with left arm and right leg, alternating 10 times.  2. Prone lumbar extension: Lie face down, arms extended overhead, palms on the floor. At the same time, raise your right arm and left leg as high as comfortably possible. Hold for 10 seconds and slowly return to start. Repeat with left arm and right leg, alternating 10 times. Gradually build up to 20 times. (Advanced: Repeat this exercise raising both arms and both legs a few inches off the floor at the same time. Hold for 5 seconds and release.)  3. Pelvic tilt: Lie on the floor on your back with your knees bent at 90 degrees. Your feet should be flat on the floor. Inhale, exhale, then slowly contract your abdominal muscles bringing your navel toward your spine. Let your pelvis rock back until your lower back is flat on the floor. Hold for 10 seconds while breathing smoothly.  4. Abdominal crunch: Perform a pelvic tilt (above) flattening your lower back against the floor. Holding the tension in your abdominal muscles, take another breath and raise your shoulder blades off the ground (this is not a full sit-up).  Keep your head in line with your body (dont bend your neck forward). Hold for 2 seconds, then slowly lower.  Date Last Reviewed: 6/1/2016  © 0683-0362 [employer]. [address] [place] [address]. All rights reserved. This information is not intended as a substitute for professional medical care. Always follow your healthcare professional's instructions.        Possible Causes of Low Back or Leg Pain    The symptoms in your back or leg may be due to pressure on a nerve. This pressure may be caused by a damaged disk or by abnormal bone growth. Either way, you may feel pain, burning, tingling, or numbness. If you have pressure on a nerve that connects to the sciatic nerve, pain may shoot down your leg.    Pressure from the disk  Constant wear and tear can weaken a disk over time and cause back pain. The disk can then be damaged by a sudden movement or injury. If its soft center begins to bulge, the disk may press on a nerve. Or the outside of the disk may tear, and the soft center may squeeze through and pinch a nerve.    Pressure from bone  As a disk wears out, the vertebrae right above and below the disk begin to touch. This can put pressure on a nerve. Often, abnormal bone (called bone spurs) grows where the vertebrae rub against each other. This can cause the foramen or the spinal canal to narrow (called stenosis) and press against a nerve.  Date Last Reviewed: 10/4/2015  © 2129-1892 [employer]. [address] [place] [address]. All rights reserved. This information is not intended as a substitute for professional medical care. Always follow your healthcare professional's instructions.        Understanding Lumbar Radiculopathy    Lumbar radiculopathy is irritation or inflammation of a nerve root in the low back. It causes symptoms that spread out from the back down one or both legs. To understand this condition, it helps to understand the parts of the  spine:  · Vertebrae. These are bones that stack to form the spine. The lumbar spine contains the 5 bottom vertebrae.  · Disks. These are soft pads of tissue between the vertebrae. They act as shock absorbers for the spine.  · Spinal canal. This is a tunnel formed within the stacked vertebrae. In the lumbar spine, nerves run through this canal.  · Nerves. These branch off and leave the spinal canal, traveling out to parts of the body. As they leave the spinal canal, nerves pass through openings between the vertebrae. The nerve root is the part of the nerve that is closest to the spinal canal.  · Sciatic nerve. This is a large nerve formed from several nerve roots in the low back. This nerve extends down the back of the leg to the foot.  With lumbar radiculopathy, nerve roots in the low back become irritated. This leads to pain and symptoms. The sciatic nerve is commonly involved, so the condition is often called sciatica.  What causes lumbar radiculopathy?  Aging, injury, poor posture, extra body weight, and other issues can lead to problems in the low back. These problems may then irritate nerve roots. They include:  · Damage to a disk in the lumbar spine. The damaged disk may then press on nearby nerve roots.  · Degeneration from wear and tear, and aging. This can lead to narrowing (stenosis) of the openings between the vertebrae. The narrowed openings press on nerve roots as they leave the spinal canal.  · Unstable spine. This is when a vertebra slips forward. It can then press on a nerve root.  Other, less common things can put pressure on nerves in the low back. These include diabetes, infection, or a tumor.  Symptoms of lumbar radiculopathy  These include:  · Pain in the low back  · Pain, numbness, tingling, or weakness that travels into the buttocks, hip, groin, or leg  · Muscle spasms  Treatment for lumbar radiculopathy  In most cases, your healthcare provider will first try treatments that help relieve  symptoms. These may include:  · Prescription and over-the-counter pain medicines. These help relieve pain, swelling, and irritation.  · Limits on positions and activities that increase pain. But lying in bed or avoiding all movement is only recommended for a short period of time.  · Physical therapy, including exercises and stretches. This helps decrease pain and increase movement and function.  · Steroid shots into the lower back. This may help relieve symptoms for a time.  · Weight-loss program. If you are overweight, losing extra pounds may help relieve symptoms.  In some cases, you may need surgery to fix the underlying problem. This depends on the cause, the symptoms, and how long the pain has lasted.  Possible complications  Over time, an irritated and inflamed nerve may become damaged. This may lead to long-lasting (permanent) numbness or weakness in your legs and feet. If symptoms change suddenly or get worse, be sure to let your healthcare provider know.  When to call your healthcare provider  Call your healthcare provider right away if you have any of these:  · New pain or pain that gets worse  · New or increasing weakness, tingling, or numbness in your leg or foot  · Problems controlling your bladder or bowel   Date Last Reviewed: 3/10/2016  © 1116-0487 [employer]. [address] [place] [address], [place] [address]. All rights reserved. This information is not intended as a substitute for professional medical care. Always follow your healthcare professional's instructions.

## 2018-06-21 NOTE — PROGRESS NOTES
[facility] [address] [place] [address]  Phone: [phone]          Full Name: marilynn tsang YOB: 1955  Patient ID: [id number]      Visit Date: 6/21/2018 09:06  Age: 62 Years 10 Months Old  Examining Physician: Samantha Restrepo M.D.      Chief Complaint   Patient presents with    Leg Pain       HPI: This is a 62 y.o.  male being seen in clinic today for evaluation of chronic low back and left foot pain with numbness/tingling.  His symptoms are constant, but often worse with prolonged walking/standing.  Rest/sitting provides some relief.  He has difficulty walking due to abnormal sensation in his foot and his right BKA.  He denies weakness in his leg.  He is still undergoing treatment for his left great toe ulcer-still may be facing amputation     History obtained from patient    Past family, medical, social, and surgical history reviewed in chart    Review of Systems:     General- denies lethargy, weight change, fever, chills  Head/neck- denies swallowing difficulties  ENT- denies hearing changes  Cardiovascular-denies chest pain  Pulmonary- denies shortness of breath  GI- denies constipation or bowel incontinence  - denies bladder incontinence  Skin- denies wounds or rashes  Musculoskeletal- denies weakness, +pain  Neurologic- +numbness and tingling  Psychiatric- denies depressive or psychotic features, denies anxiety  Lymphatic-denies swelling  Endocrine- denies hypoglycemic symptoms/+DM history  All other pertinent systems negative     Physical Examination:  General: Well developed, well nourished male, NAD  HEENT:NCAT EOMI bilaterally   Pulmonary:Normal respirations    Spinal Examination: CERVICAL  Active ROM is within normal limits.  Inspection: No deformity of spinal alignment.  Palpation: No vertebral tenderness to percussion.      Spinal Examination: LUMBAR or THORACIC  Active ROM is limited at endranges  Inspection: No deformity of spinal alignment.     Palpation: No vertebral tenderness to percussion. Ttpat si joints and paraspinals       Bilateral Upper and Lower Extremities:  Pulses are 2+ at radial bilaterally.  Shoulder/Elbow/Wrist/Hand ROM   Hip/Knee/Ankle ROM wnl  Bilateral Extremities show normal capillary refill.  No signs of cyanosis, rubor, edema, skin changes, or dysvascular changes of appendages.  Nails appear intact.    Neurological Exam:  Cranial Nerves:  II-XII grossly intact    Manual Muscle Testing: (Motor 5=normal)  5/5 strength bilateral lower extremities except nt at right adf/apf ehl (BKA)    No focal atrophy is noted of either lower extremity.    Bilateral Reflexes:hypo at patellar  No clonus at knee or ankle.    Sensation: tested to light touch  - intact in legs except dec at plantar and dorsal surface of foot, mid-lateral shin    Gait: mildly antalgic, slight hip hike with right BKA     Entire procedure explained to patient prior to proceeding.  Verbal consent obtained    SNC      Nerve / Sites Rec. Site Onset Lat Peak Lat Amp Segments Distance Velocity     ms ms µV  mm m/s   L Sural - Ankle (Calf)      Calf Ankle 3.8 4.3 1.6 Calf - Ankle 140 37       MNC      Nerve / Sites Muscle Latency Amplitude Duration Rel Amp Segments Distance Lat Diff Velocity     ms mV ms %  mm ms m/s   L Peroneal - EDB      Ankle EDB 6.6 0.1 5.9 100 Ankle - EDB 80        Fib head EDB 16.6 0.0 5.9 51.1 Fib head - Ankle  10.0       Pop fossa EDB 16.7 0.0 6.6 117 Pop fossa - Fib head  0.1          Pop fossa - Ankle  10.1    L Tibial - AH      Ankle AH 6.4 0.0 5.5 100 Ankle - AH 80        Pop fossa AH 24.7 0.0 5.5 14.3 Pop fossa - Ankle 420 18.3 23       EMG            EMG Summary Table     Spontaneous MUAP Recruitment   Muscle IA Fib PSW Fasc Other Amp Dur Polys Pattern Effort   L. Tibialis anterior Incr 1+ 1+ None . N Sl Incr 1+ Reduced Max   L. Gastrocnemius (Medial head) N None None None . N Sl Incr 1+ Reduced Max   L. Rectus femoris Incr None None None . N n 1+  Reduced Max   L. Peroneus longus Incr None None None . N n 1+ Reduced Max   L. Biceps femoris (short head) N None None None . N Sl Incr 1+ Reduced Max                    INTERPRETATION  -Left sural sensory nerve  conduction study showed normal peak latency and dec amplitude  -Left peroneal motor nerve conduction study showed prolonged latency, dec amplitude, and dec conduction velocity  -Right tibial motor nerve conduction study showed prolonged latency, dec amplitude, and dec conduction velocity  -Needle EMG examination performed to above mentioned muscles     IMPRESSION  1. ABNORMAL study  2. There is electrodiagnostic evidence of an ACUTE on CHRONIC radiculopathy of the Left L4 nerve root and a CHRONIC radiculopathy of the Left L5 and S1 nerve roots     PLAN  1. Follow up with referring provider: Dr Berrios  2. Handouts on lumbar radic provided.  3. This study is good for one year. If symptoms worsen or do not improve, please re-consult.    Samantha Restrepo M.D.  Physical Medicine and Rehab

## 2018-06-21 NOTE — TELEPHONE ENCOUNTER
Returned patient's call regarding issues with his foot. Patient reports he noticed clear drainage on his foot after walking around. Patient states he was calling for directions. Patient reports he had a doctor's appointment today & the nurse look at it and there was no drainage, redness or swelling noted. Patient reports he has a follow-up appointment with podiatry 7/9/18. Coordinator advised patient to contact his Podiatrist if he notices any usual drainage, redness, swelling, foul odor or fever; also advised patient to keep his appointment with podiatry. Patient verbalized understanding.

## 2018-06-22 ENCOUNTER — OFFICE VISIT (OUTPATIENT)
Dept: NEUROSURGERY | Facility: CLINIC | Age: 63
End: 2018-06-22
Payer: MEDICARE

## 2018-06-22 VITALS — BODY MASS INDEX: 32.26 KG/M2 | HEIGHT: 70 IN | WEIGHT: 225.31 LBS

## 2018-06-22 DIAGNOSIS — M48.062 LUMBAR STENOSIS WITH NEUROGENIC CLAUDICATION: Primary | ICD-10-CM

## 2018-06-22 PROCEDURE — 99214 OFFICE O/P EST MOD 30 MIN: CPT | Mod: S$GLB,,, | Performed by: NEUROLOGICAL SURGERY

## 2018-06-22 PROCEDURE — 3008F BODY MASS INDEX DOCD: CPT | Mod: CPTII,S$GLB,, | Performed by: NEUROLOGICAL SURGERY

## 2018-06-22 NOTE — PROGRESS NOTES
Neurosurgery History and Physical    Patient ID: Lavelle Ladd is a 62 y.o. male.    Chief Complaint   Patient presents with    Lumbar Spine Pain (L-Spine)     pt states lower back and LLE with numbness to foot; increase in urination; infection to left foot and spent 4 days in hospital for it at [facility]; bowel issues only d/t antibiotics lately; had angiogram recently; had EMG and xrays         Review of Systems   Constitutional: Positive for activity change.   HENT: Negative.    Eyes: Negative.    Respiratory: Negative.    Cardiovascular: Negative.    Gastrointestinal: Negative.    Endocrine: Negative.    Genitourinary: Negative.    Musculoskeletal: Positive for back pain and gait problem.   Skin: Positive for wound.   Allergic/Immunologic: Negative.    Neurological: Positive for numbness. Negative for weakness.   Hematological: Negative.    Psychiatric/Behavioral: Negative.        Past Medical History:   Diagnosis Date    DINORAH (acute kidney injury) 2016    Arthritis     Chronic obstructive pulmonary disease 2016    Coronary artery disease involving native coronary artery of native heart without angina pectoris 2016     donor kidney transplant for DM 16     Induction with Thymo x3 and IV solumedrol to total 875mg  Kidney Biopsy  2016: 16 glomeruli, ACR type 1 AVR type 2, significant microcirculatory changes, c4d negative, No DSA, 5 to10% fibrosis. Treated with thymo x8 2016- no rejection      Diastolic heart failure     Encounter for blood transfusion     ESRD on RRT since 10/2013 10/29/2013    Biopsy proven diabetic nephropathy and lymphoplasmacytic interstitial infiltrate not c/w with AIN (ddx sjogrens or assoc with tamm-horsefall protein extravasation)     GERD (gastroesophageal reflux disease)     History of hepatitis C, s/p successful Rx w/ SVR12 - 2017    Completed 12 weeks harvoni w/ SVR    Hyperlipidemia     Hypertension     Prophylactic  "immunotherapy     Proteinuria     PVD (peripheral vascular disease) 6/26/2017    RLE BKA CT 12/11/16 Extensive atherosclerotic disease of the aorta and mesenteric arteries.     Renal hypertension     Type 2 diabetes mellitus with diabetic neuropathy, with long-term current use of insulin 12/1/2016    Vitamin B12 deficiency      Social History     Social History    Marital status: Single     Spouse name: N/A    Number of children: N/A    Years of education: N/A     Occupational History    Disabled      Disabled     Social History Main Topics    Smoking status: Former Smoker     Packs/day: 1.00     Years: 40.00     Quit date: 1/11/2013    Smokeless tobacco: Never Used    Alcohol use 3.6 oz/week     6 Cans of beer per week      Comment: 6 beers/week    Drug use: No    Sexual activity: No     Other Topics Concern    Not on file     Social History Narrative    Lives alone. Retired from construction and various jobs, now retired. Would like to return to work PT to alleviate boredom.     Family History   Problem Relation Age of Onset    Cancer Father     Diabetes Father     Heart failure Father     Stroke Father     Heart failure Mother     Kidney disease Neg Hx      Review of patient's allergies indicates:  No Known Allergies    Current Outpatient Prescriptions:     amLODIPine (NORVASC) 2.5 MG tablet, Take 1 tablet (2.5 mg total) by mouth once daily., Disp: 30 tablet, Rfl: 11    aspirin (ECOTRIN) 81 MG EC tablet, Take 1 tablet (81 mg total) by mouth once daily., Disp: , Rfl: 0    BD INSULIN PEN NEEDLE UF SHORT 31 gauge x 5/16" Ndle, , Disp: , Rfl:     BD INSULIN SYRINGE ULTRA-FINE 1 mL 31 gauge x 5/16 Syrg, USE ONE AS DIRECTED FOUR TIMES DAILY WITH MEALS AND AT BEDTIME, Disp: 120 each, Rfl: 10    blood sugar diagnostic Strp, 1 each by Misc.(Non-Drug; Combo Route) route 3 (three) times daily., Disp: 100 each, Rfl: 11    blood-glucose meter kit, Use as instructed, Disp: 1 each, Rfl: 0    " "budesonide-formoterol 160-4.5 mcg (SYMBICORT) 160-4.5 mcg/actuation HFAA, Inhale 2 puffs into the lungs every 12 (twelve) hours. Wash out mouth after using, Disp: 1 Inhaler, Rfl: 12    cephALEXin (KEFLEX) 500 MG capsule, Take 1 capsule (500 mg total) by mouth every 12 (twelve) hours., Disp: 14 capsule, Rfl: 0    ergocalciferol (ERGOCALCIFEROL) 50,000 unit Cap, Take 1 capsule (50,000 Units total) by mouth every 7 days. Take on Mondays, Disp: 4 capsule, Rfl: 11    gabapentin (NEURONTIN) 300 MG capsule, Take one tab bid and 2 at night. (Patient taking differently: 3 (three) times daily. Take one tab bid and 2 at night.), Disp: 120 capsule, Rfl: 11    HYDROcodone-acetaminophen (NORCO) 5-325 mg per tablet, Take 1 tablet by mouth every 6 (six) hours as needed for Pain., Disp: 20 tablet, Rfl: 0    inhalation device (BREATHERITE VALVED MDI CHAMBER), Use as directed for inhalation., Disp: 1 Device, Rfl: prn    insulin NPH (NOVOLIN N) 100 unit/mL injection, Take 25 units subq with breakfast and 15 units at bedtime, Disp: 4 vial, Rfl: 0    lancets Misc, 1 each by Misc.(Non-Drug; Combo Route) route 3 (three) times daily., Disp: 100 each, Rfl: 11    NOVOLIN R REGULAR U-100 INSULN 100 unit/mL injection, INJECT 6 UNITS WITH BREAKFAST AND 8 UNITS WITH DINNER, SUBCUTANEOUSLY 30 MIN BEFORE MEAL., Disp: 10 mL, Rfl: 1    ondansetron (ZOFRAN-ODT) 4 MG TbDL, Take 1 tablet (4 mg total) by mouth every 8 (eight) hours as needed., Disp: 21 tablet, Rfl: 1    pen needle, diabetic (EASY COMFORT PEN NEEDLES) 32 gauge x 5/32" Ndle, Inject 1 each into the skin 3 (three) times daily., Disp: 100 each, Rfl: 11    pen needle, diabetic 31 gauge x 1/4" Ndle, 1 each by Misc.(Non-Drug; Combo Route) route 4 (four) times daily., Disp: 100 each, Rfl: 0    predniSONE (DELTASONE) 5 MG tablet, Take 1 tablet (5 mg total) by mouth once daily., Disp: 30 tablet, Rfl: 11    sodium bicarbonate 650 MG tablet, Take 4 tablets (2,600 mg total) by mouth 2 " "(two) times daily., Disp: 240 tablet, Rfl: 11    tacrolimus (PROGRAF) 0.5 MG Cap, Take 3 capsules (1.5 mg total) by mouth every 12 (twelve) hours. Z94.0 Kidney Transplant, Disp: 180 capsule, Rfl: 11    zolpidem (AMBIEN) 5 MG Tab, Take 2 tablets (10 mg total) by mouth nightly as needed., Disp: 30 tablet, Rfl: 1  Height 5' 10" (1.778 m), weight 102.2 kg (225 lb 5 oz).      Neurologic Exam     Mental Status   Oriented to person, place, and time.   Attention: normal. Concentration: normal.   Speech: speech is normal   Level of consciousness: alert  Knowledge: good.     Cranial Nerves     CN II   Visual acuity: normal    CN III, IV, VI   Pupils are equal, round, and reactive to light.  Extraocular motions are normal.     CN V   Facial sensation intact.     CN VII   Facial expression full, symmetric.     CN VIII   Hearing: intact    CN IX, X   Palate: symmetric    CN XI   CN XI normal.     CN XII   CN XII normal.     Motor Exam   Muscle bulk: normal  Overall muscle tone: normal  Right arm pronator drift: absent  Left arm pronator drift: absent    Strength   Right deltoid: 5/5  Left deltoid: 5/5  Right biceps: 5/5  Left biceps: 5/5  Right triceps: 5/5  Left triceps: 5/5  Right wrist flexion: 5/5  Left wrist flexion: 5/5  Right wrist extension: 5/5  Left wrist extension: 5/5  Right interossei: 5/5  Left interossei: 5/5  Right iliopsoas: 5/5  Left iliopsoas: 5/5  Right quadriceps: 5/5  Left quadriceps: 5/5  Left anterior tibial: 5/5  Left posterior tibial: 5/5  Left peroneal: 4/5  Left gastroc: 5/5    Sensory Exam   Right arm light touch: normal  Left arm light touch: normal  Left leg light touch: decreased from knee    Gait, Coordination, and Reflexes     Gait  Gait: normal    Coordination   Romberg: negative  Finger to nose coordination: normal    Tremor   Resting tremor: absent    Reflexes   Right brachioradialis: 0  Left brachioradialis: 0  Right biceps: 0  Left biceps: 0  Right triceps: 0  Left triceps: 0  Right " "patellar: 0  Left patellar: 0  Right achilles: 1+  Left achilles: 0  Left plantar: normal  Right Drew: absent  Left Drew: absent  Left ankle clonus: absent      Physical Exam   Constitutional: He is oriented to person, place, and time. He appears well-developed and well-nourished.   HENT:   Head: Normocephalic and atraumatic.   Eyes: EOM are normal. Pupils are equal, round, and reactive to light.   Neck: Normal range of motion. Neck supple.   Cardiovascular: Normal rate and regular rhythm.    Pulmonary/Chest: Effort normal.   SOB     Abdominal: Soft.   Musculoskeletal: Normal range of motion.   Neurological: He is alert and oriented to person, place, and time. He has a normal Finger-Nose-Finger Test and a normal Romberg Test. Gait normal.   Reflex Scores:       Tricep reflexes are 0 on the right side and 0 on the left side.       Bicep reflexes are 0 on the right side and 0 on the left side.       Brachioradialis reflexes are 0 on the right side and 0 on the left side.       Patellar reflexes are 0 on the right side and 0 on the left side.       Achilles reflexes are 1+ on the right side and 0 on the left side.  Skin: Skin is warm and dry.   Psychiatric: He has a normal mood and affect. His speech is normal and behavior is normal. Judgment and thought content normal.   Nursing note and vitals reviewed.      Vital Signs  Pain Score:   5  Pain Loc: Back  Height and Weight  Height: 5' 10" (177.8 cm)  Weight: 102.2 kg (225 lb 5 oz)  BSA (Calculated - sq m): 2.25 sq meters  BMI (Calculated): 32.4  Weight in (lb) to have BMI = 25: 173.9]    Provider dictation:  I reviewed the imaging. He has advanced multilevel spondylotic changes and severe stenosis at L3-L4>L4-L5. This is due to a combination of congeniatally narrow spinal canal and disc herniation with some contribution from prominent epidural fat. He describes bilateral leg pain, numbness and tingling starting in hie lower back and radiating down . This is worse " with ambulation. He has a H/O diabetic peripheral neuropathy and right BKA. He has seen Dr Rees in the past who offered him L3-L4 decompression. He has tried PT and ALLI in the past with no improvement.     He was recently admitted for a left foot infection. He also recently underwent endovascular revascularization of his left leg. He continues to have progressive low back pain and leg weakness when he stands or walks for short distances.    EMG/NCT showed active left L4 and chronic left L5 and S1 radiculopathies. Flexion-extension XR did not show any instability at L3-L4.     He has multiple severe medical comorbidities, including diabetes, renal failure S/P renal transplant on immunosuppressants, heart failure, chronic pain. I have again explained that outcome of lumbar decompression in patients with severe diabetic neuropathy is often not as successful and that surgical intervention would be risky with his medical comorbidities. Furthermore, at this point, he is not a candidate for elective spine surgery due to his chronic left toe infection. He stated that he understands and he wants something to be done to help with low back and leg symptoms and his walking limitation. He will call to schedule an appointment once his chronic infection issues are resolved to be re-evaluated for possible left L3-L4 microdiscectomy.    Visit Diagnosis:  Lumbar stenosis with neurogenic claudication

## 2018-06-27 LAB
POC ACTIVATED CLOTTING TIME K: 125 SEC (ref 74–137)
SAMPLE: NORMAL

## 2018-06-28 ENCOUNTER — HOSPITAL ENCOUNTER (EMERGENCY)
Facility: HOSPITAL | Age: 63
Discharge: HOME | End: 2018-06-29
Attending: EMERGENCY MEDICINE
Payer: MEDICARE

## 2018-06-28 DIAGNOSIS — L08.9 FOOT INFECTION: Primary | ICD-10-CM

## 2018-06-28 LAB
BACTERIA #/AREA URNS HPF: NORMAL /HPF
BASOPHILS # BLD AUTO: 0.01 K/UL
BASOPHILS NFR BLD: 0.2 %
BILIRUB UR QL STRIP: NEGATIVE
CLARITY UR: CLEAR
COLOR UR: YELLOW
DIFFERENTIAL METHOD: ABNORMAL
EOSINOPHIL # BLD AUTO: 0.1 K/UL
EOSINOPHIL NFR BLD: 1 %
ERYTHROCYTE [DISTWIDTH] IN BLOOD BY AUTOMATED COUNT: 13.4 %
GLUCOSE UR QL STRIP: ABNORMAL
HCT VFR BLD AUTO: 46.5 %
HGB BLD-MCNC: 15.5 G/DL
HGB UR QL STRIP: ABNORMAL
HYALINE CASTS #/AREA URNS LPF: 0 /LPF
KETONES UR QL STRIP: NEGATIVE
LEUKOCYTE ESTERASE UR QL STRIP: NEGATIVE
LYMPHOCYTES # BLD AUTO: 1.5 K/UL
LYMPHOCYTES NFR BLD: 25.7 %
MCH RBC QN AUTO: 31.4 PG
MCHC RBC AUTO-ENTMCNC: 33.3 G/DL
MCV RBC AUTO: 94 FL
MICROSCOPIC COMMENT: NORMAL
MONOCYTES # BLD AUTO: 0.6 K/UL
MONOCYTES NFR BLD: 9.4 %
NEUTROPHILS # BLD AUTO: 3.7 K/UL
NEUTROPHILS NFR BLD: 63.7 %
NITRITE UR QL STRIP: NEGATIVE
PH UR STRIP: 7 [PH] (ref 5–8)
PLATELET # BLD AUTO: 178 K/UL
PMV BLD AUTO: 10.5 FL
PROT UR QL STRIP: ABNORMAL
RBC # BLD AUTO: 4.93 M/UL
RBC #/AREA URNS HPF: 1 /HPF (ref 0–4)
SP GR UR STRIP: 1.01 (ref 1–1.03)
SQUAMOUS #/AREA URNS HPF: 1 /HPF
URN SPEC COLLECT METH UR: ABNORMAL
UROBILINOGEN UR STRIP-ACNC: NEGATIVE EU/DL
WBC # BLD AUTO: 5.87 K/UL
WBC #/AREA URNS HPF: 0 /HPF (ref 0–5)
YEAST URNS QL MICRO: NORMAL

## 2018-06-28 PROCEDURE — 96372 THER/PROPH/DIAG INJ SC/IM: CPT | Mod: 59

## 2018-06-28 PROCEDURE — 82962 GLUCOSE BLOOD TEST: CPT

## 2018-06-28 PROCEDURE — 87040 BLOOD CULTURE FOR BACTERIA: CPT | Mod: 59

## 2018-06-28 PROCEDURE — 85025 COMPLETE CBC W/AUTO DIFF WBC: CPT

## 2018-06-28 PROCEDURE — 96375 TX/PRO/DX INJ NEW DRUG ADDON: CPT

## 2018-06-28 PROCEDURE — 25000003 PHARM REV CODE 250: Performed by: EMERGENCY MEDICINE

## 2018-06-28 PROCEDURE — 99284 EMERGENCY DEPT VISIT MOD MDM: CPT | Mod: 25

## 2018-06-28 PROCEDURE — 96365 THER/PROPH/DIAG IV INF INIT: CPT

## 2018-06-28 PROCEDURE — 80053 COMPREHEN METABOLIC PANEL: CPT

## 2018-06-28 PROCEDURE — 63600175 PHARM REV CODE 636 W HCPCS: Performed by: EMERGENCY MEDICINE

## 2018-06-28 PROCEDURE — 96361 HYDRATE IV INFUSION ADD-ON: CPT

## 2018-06-28 PROCEDURE — 81000 URINALYSIS NONAUTO W/SCOPE: CPT

## 2018-06-28 RX ADMIN — VANCOMYCIN HYDROCHLORIDE 1250 MG: 100 INJECTION, POWDER, LYOPHILIZED, FOR SOLUTION INTRAVENOUS at 10:06

## 2018-06-29 VITALS
SYSTOLIC BLOOD PRESSURE: 195 MMHG | HEART RATE: 82 BPM | OXYGEN SATURATION: 97 % | DIASTOLIC BLOOD PRESSURE: 90 MMHG | HEIGHT: 71 IN | TEMPERATURE: 98 F | WEIGHT: 215 LBS | BODY MASS INDEX: 30.1 KG/M2 | RESPIRATION RATE: 18 BRPM

## 2018-06-29 LAB
ALBUMIN SERPL BCP-MCNC: 3.5 G/DL
ALP SERPL-CCNC: 127 U/L
ALT SERPL W/O P-5'-P-CCNC: 12 U/L
ANION GAP SERPL CALC-SCNC: 13 MMOL/L
AST SERPL-CCNC: 13 U/L
BILIRUB SERPL-MCNC: 0.6 MG/DL
BUN SERPL-MCNC: 31 MG/DL
CALCIUM SERPL-MCNC: 9.8 MG/DL
CHLORIDE SERPL-SCNC: 100 MMOL/L
CO2 SERPL-SCNC: 21 MMOL/L
CREAT SERPL-MCNC: 2.1 MG/DL
EST. GFR  (AFRICAN AMERICAN): 37 ML/MIN/1.73 M^2
EST. GFR  (NON AFRICAN AMERICAN): 32 ML/MIN/1.73 M^2
GLUCOSE SERPL-MCNC: 311 MG/DL (ref 70–110)
GLUCOSE SERPL-MCNC: 451 MG/DL
POCT GLUCOSE: 311 MG/DL (ref 70–110)
POTASSIUM SERPL-SCNC: 4.6 MMOL/L
PROT SERPL-MCNC: 7.2 G/DL
SODIUM SERPL-SCNC: 134 MMOL/L

## 2018-06-29 PROCEDURE — 63600175 PHARM REV CODE 636 W HCPCS: Performed by: EMERGENCY MEDICINE

## 2018-06-29 PROCEDURE — 25000003 PHARM REV CODE 250: Performed by: EMERGENCY MEDICINE

## 2018-06-29 RX ORDER — CLINDAMYCIN HYDROCHLORIDE 150 MG/1
450 CAPSULE ORAL EVERY 8 HOURS
Qty: 45 CAPSULE | Refills: 0 | Status: SHIPPED | OUTPATIENT
Start: 2018-06-29 | End: 2018-07-04

## 2018-06-29 RX ORDER — HYDRALAZINE HYDROCHLORIDE 20 MG/ML
10 INJECTION INTRAMUSCULAR; INTRAVENOUS
Status: COMPLETED | OUTPATIENT
Start: 2018-06-29 | End: 2018-06-29

## 2018-06-29 RX ADMIN — INSULIN HUMAN 10 UNITS: 100 INJECTION, SOLUTION PARENTERAL at 12:06

## 2018-06-29 RX ADMIN — HYDRALAZINE HYDROCHLORIDE 10 MG: 20 INJECTION INTRAMUSCULAR; INTRAVENOUS at 12:06

## 2018-06-29 RX ADMIN — SODIUM CHLORIDE 1000 ML: 0.9 INJECTION, SOLUTION INTRAVENOUS at 12:06

## 2018-06-29 NOTE — ED PROVIDER NOTES
SCRIBE #1 NOTE: I, Bryan Lim, am scribing for, and in the presence of, Judd Rasmussen MD. I have scribed the entire note.      History      Chief Complaint   Patient presents with    Wound Infection     left big toe infection. Reports hx of same        Review of patient's allergies indicates:  No Known Allergies     HPI   HPI    2018, 9:14 PM   History obtained from the patient      History of Present Illness: Lavelle Ladd is a 62 y.o. male patient who presents to the Emergency Department for an evaluation of an infection to his left great toe which onset gradually a few days ago. Pt reports having a similar infection in the past. Pt has reportedly been on abx for his foot since being admitted during his visit on 18. Symptoms are constant and moderate in severity. Exacerbated by nothing and relieved by nothing. Associated sxs include redness/swelling/warmth to area. Patient denies any fever, chills, gait problem, drainage, trauma, and all other sxs at this time. No further complaints or concerns at this time.       Arrival mode: Personal vehicle    PCP: Rishabh Esteban MD       Past Medical History:  Past Medical History:   Diagnosis Date    DINORAH (acute kidney injury) 2016    Arthritis     Chronic obstructive pulmonary disease 2016    Coronary artery disease involving native coronary artery of native heart without angina pectoris 2016     donor kidney transplant for DM 16     Induction with Thymo x3 and IV solumedrol to total 875mg  Kidney Biopsy  2016: 16 glomeruli, ACR type 1 AVR type 2, significant microcirculatory changes, c4d negative, No DSA, 5 to10% fibrosis. Treated with thymo x8 2016- no rejection      Diastolic heart failure     Encounter for blood transfusion     ESRD on RRT since 10/2013 10/29/2013    Biopsy proven diabetic nephropathy and lymphoplasmacytic interstitial infiltrate not c/w with AIN (ddx sjogrens or assoc with tamm-horsefall  protein extravasation)     GERD (gastroesophageal reflux disease)     History of hepatitis C, s/p successful Rx w/ SVR12 - 4/2017 4/5/2017    Completed 12 weeks harvoni w/ SVR    Hyperlipidemia     Hypertension     Prophylactic immunotherapy     Proteinuria     PVD (peripheral vascular disease) 6/26/2017    RLE BKA CT 12/11/16 Extensive atherosclerotic disease of the aorta and mesenteric arteries.     Renal hypertension     Type 2 diabetes mellitus with diabetic neuropathy, with long-term current use of insulin 12/1/2016    Vitamin B12 deficiency        Past Surgical History:  Past Surgical History:   Procedure Laterality Date    AORTOGRAPHY WITH SERIALOGRAPHY N/A 6/14/2018    Procedure: LEFT LEG ANGIOGRAM;  Surgeon: Donal Mcdonald MD;  Location: [facility] CATH LAB;  Service: Vascular;  Laterality: N/A;    av bovine graft      Left UE    AV FISTULA PLACEMENT      left UE    CARDIAC CATHETERIZATION  02/2015    KIDNEY TRANSPLANT  05/21/2016    LEG AMPUTATION THROUGH KNEE  2011    right LE, started as nail puncture leading to diabetic ulcer         Family History:  Family History   Problem Relation Age of Onset    Cancer Father     Diabetes Father     Heart failure Father     Stroke Father     Heart failure Mother     Kidney disease Neg Hx        Social History:  Social History     Social History Main Topics    Smoking status: Former Smoker     Packs/day: 1.00     Years: 40.00     Quit date: 1/11/2013    Smokeless tobacco: Never Used    Alcohol use 3.6 oz/week     6 Cans of beer per week      Comment: 6 beers/week    Drug use: No    Sexual activity: No       ROS   Review of Systems   Constitutional: Negative for chills and fever.        (-) Trauma   HENT: Negative for congestion and sore throat.    Respiratory: Negative for cough and shortness of breath.    Cardiovascular: Negative for chest pain.   Gastrointestinal: Negative for abdominal pain, nausea and vomiting.   Genitourinary: Negative for  "dysuria, frequency, hematuria and urgency.   Musculoskeletal: Negative for back pain and neck pain.   Skin: Negative for pallor and rash.        (+) Redness/swelling/warmth to left great toe  (-) Drainage   Neurological: Negative for weakness and numbness.        (-) Paresthesia   Hematological: Does not bruise/bleed easily.     Physical Exam      Initial Vitals [06/28/18 1905]   BP Pulse Resp Temp SpO2   (!) 187/97 98 18 97.7 °F (36.5 °C) 96 %      MAP       --          Physical Exam  Nursing Notes and Vital Signs Reviewed.  Constitutional: Patient is in no acute distress. Well-developed and well-nourished.  Head: Atraumatic. Normocephalic.  Eyes: PERRL. EOM intact. Conjunctivae are not pale. No scleral icterus.  ENT: Mucous membranes are moist. Oropharynx is clear and symmetric.    Neck: Supple. Full ROM. No lymphadenopathy.  Cardiovascular: Regular rate. Regular rhythm.  Pulmonary/Chest: No respiratory distress. Clear to auscultation bilaterally. No wheezing or rales.  Abdominal: Soft and non-distended.  There is no tenderness.   Musculoskeletal: Moves all extremities. Right BKA. Purulent drainage noted coming from left great toenail with an ulcer to the distal tip of left great toe.  Skin: Warm and dry.  Neurological:  Alert, awake, and appropriate.  Normal speech.  No acute focal neurological deficits are appreciated.  Psychiatric: Normal affect. Good eye contact. Appropriate in content.    ED Course    Procedures  ED Vital Signs:  Vitals:    06/28/18 1905 06/28/18 2251 06/29/18 0006   BP: (!) 187/97 (!) 180/97 (!) 209/105   Pulse: 98 74 77   Resp: 18 18 18   Temp: 97.7 °F (36.5 °C) 98 °F (36.7 °C)    TempSrc: Oral Oral    SpO2: 96% 97% 99%   Weight: 97.5 kg (215 lb)     Height: 5' 11" (1.803 m)         Abnormal Lab Results:  Labs Reviewed   CBC W/ AUTO DIFFERENTIAL - Abnormal; Notable for the following:        Result Value    MCH 31.4 (*)     All other components within normal limits   COMPREHENSIVE METABOLIC " PANEL - Abnormal; Notable for the following:     Sodium 134 (*)     CO2 21 (*)     Glucose 451 (*)     BUN, Bld 31 (*)     Creatinine 2.1 (*)     eGFR if  37 (*)     eGFR if non  32 (*)     All other components within normal limits    Narrative:     Glucose critical result(s) called and verbal readback obtained from   Marek Taylor RN., 06/29/2018 00:06   URINALYSIS, REFLEX TO URINE CULTURE - Abnormal; Notable for the following:     Protein, UA 1+ (*)     Glucose, UA 3+ (*)     Occult Blood UA Trace (*)     All other components within normal limits    Narrative:     Preferred Collection Type->Urine, Clean Catch   POCT GLUCOSE MONITORING CONTINUOUS - Abnormal; Notable for the following:     POC Glucose 311 (*)     All other components within normal limits   POCT GLUCOSE - Abnormal; Notable for the following:     POCT Glucose 311 (*)     All other components within normal limits   CULTURE, BLOOD   CULTURE, BLOOD   URINALYSIS MICROSCOPIC    Narrative:     Preferred Collection Type->Urine, Clean Catch        All Lab Results:  Results for orders placed or performed during the hospital encounter of 06/28/18   CBC auto differential   Result Value Ref Range    WBC 5.87 3.90 - 12.70 K/uL    RBC 4.93 4.60 - 6.20 M/uL    Hemoglobin 15.5 14.0 - 18.0 g/dL    Hematocrit 46.5 40.0 - 54.0 %    MCV 94 82 - 98 fL    MCH 31.4 (H) 27.0 - 31.0 pg    MCHC 33.3 32.0 - 36.0 g/dL    RDW 13.4 11.5 - 14.5 %    Platelets 178 150 - 350 K/uL    MPV 10.5 9.2 - 12.9 fL    Gran # (ANC) 3.7 1.8 - 7.7 K/uL    Lymph # 1.5 1.0 - 4.8 K/uL    Mono # 0.6 0.3 - 1.0 K/uL    Eos # 0.1 0.0 - 0.5 K/uL    Baso # 0.01 0.00 - 0.20 K/uL    Gran% 63.7 38.0 - 73.0 %    Lymph% 25.7 18.0 - 48.0 %    Mono% 9.4 4.0 - 15.0 %    Eosinophil% 1.0 0.0 - 8.0 %    Basophil% 0.2 0.0 - 1.9 %    Differential Method Automated    Comprehensive metabolic panel   Result Value Ref Range    Sodium 134 (L) 136 - 145 mmol/L    Potassium 4.6 3.5 - 5.1  mmol/L    Chloride 100 95 - 110 mmol/L    CO2 21 (L) 23 - 29 mmol/L    Glucose 451 (HH) 70 - 110 mg/dL    BUN, Bld 31 (H) 8 - 23 mg/dL    Creatinine 2.1 (H) 0.5 - 1.4 mg/dL    Calcium 9.8 8.7 - 10.5 mg/dL    Total Protein 7.2 6.0 - 8.4 g/dL    Albumin 3.5 3.5 - 5.2 g/dL    Total Bilirubin 0.6 0.1 - 1.0 mg/dL    Alkaline Phosphatase 127 55 - 135 U/L    AST 13 10 - 40 U/L    ALT 12 10 - 44 U/L    Anion Gap 13 8 - 16 mmol/L    eGFR if African American 37 (A) >60 mL/min/1.73 m^2    eGFR if non African American 32 (A) >60 mL/min/1.73 m^2   Urinalysis, Reflex to Urine Culture Urine, Clean Catch   Result Value Ref Range    Specimen UA Urine, Clean Catch     Color, UA Yellow Yellow, Straw, Jessa    Appearance, UA Clear Clear    pH, UA 7.0 5.0 - 8.0    Specific Gravity, UA 1.010 1.005 - 1.030    Protein, UA 1+ (A) Negative    Glucose, UA 3+ (A) Negative    Ketones, UA Negative Negative    Bilirubin (UA) Negative Negative    Occult Blood UA Trace (A) Negative    Nitrite, UA Negative Negative    Urobilinogen, UA Negative <2.0 EU/dL    Leukocytes, UA Negative Negative   Urinalysis Microscopic   Result Value Ref Range    RBC, UA 1 0 - 4 /hpf    WBC, UA 0 0 - 5 /hpf    Bacteria, UA None None-Occ /hpf    Yeast, UA None None    Squam Epithel, UA 1 /hpf    Hyaline Casts, UA 0 0-1/lpf /lpf    Microscopic Comment SEE COMMENT    POCT glucose   Result Value Ref Range    POC Glucose 311 (A) 70 - 110 MG/DL   POCT glucose   Result Value Ref Range    POCT Glucose 311 (H) 70 - 110 mg/dL     *Note: Due to a large number of results and/or encounters for the requested time period, some results have not been displayed. A complete set of results can be found in Results Review.         Imaging Results:  Imaging Results          X-Ray Foot Complete Left (Final result)  Result time 06/28/18 21:48:13    Final result by Nicolas Sawyer MD (06/28/18 21:48:13)                 Impression:      1.  Stable non healed fracture of the base of the 2nd  proximal phalanx.  Other stable findings as noted above.    2.  Negative for acute process otherwise.  Negative for plain film evidence for osteomyelitis.  Early osteomyelitis can be radiographically occult, however.    3.  Stable findings as noted above.      Electronically signed by: Nicolas Sawyer MD  Date:    06/28/2018  Time:    21:48             Narrative:    EXAMINATION:  XR FOOT COMPLETE 3 VIEW LEFT    CLINICAL HISTORY:  .  Local infection of the skin and subcutaneous tissue, unspecified    TECHNIQUE:  AP, lateral and oblique views of the left foot were performed.    COMPARISON:  January 1, 2018    FINDINGS:  Stable non healed fracture of the base of the 2nd proximal phalanx.  Stable osteopenia.  Stable degenerative changes of the interphalangeal joints, dorsal surface of the tarsal bones along with a large Achilles calcaneal spur.  Stable vascular calcifications.    No other acute osseous abnormalities are seen.                                        The Emergency Provider reviewed the vital signs and test results, which are outlined above.    ED Discussion     12:12 AM: Re-evaluated pt. Pt is resting comfortably and is in no acute distress. Pt was offered admission but denied it stating he   Would rather go home and return if his sxs worsen.  D/w pt all pertinent results. D/w pt any concerns expressed at this time. Answered all questions. Pt expresses understanding at this time.    12:24 AM: Reassessed pt at this time. Pt is awake, alert, and in NAD. Pt states his condition has improved at this time. Discussed with pt all pertinent ED information and results. Discussed pt dx and plan of tx. Gave pt all f/u and return to the ED instructions. All questions and concerns were addressed at this time. Pt expresses understanding of information and instructions, and is comfortable with plan to discharge. Pt is stable for discharge.    I discussed with patient and/or family/caretaker that evaluation in the ED does  not suggest any emergent or life threatening medical conditions requiring immediate intervention beyond what was provided in the ED, and I believe patient is safe for discharge.  Regardless, an unremarkable evaluation in the ED does not preclude the development or presence of a serious of life threatening condition. As such, patient was instructed to return immediately for any worsening or change in current symptoms.        ED Medication(s):  Medications   vancomycin (VANCOCIN) 1,250 mg in dextrose 5 % 250 mL IVPB (0 mg/kg × 84.2 kg (Order-Specific) Intravenous Stopped 6/29/18 0016)   hydrALAZINE injection 10 mg (10 mg Intravenous Given 6/29/18 0022)   insulin regular injection 10 Units (10 Units Subcutaneous Given 6/29/18 0023)   sodium chloride 0.9% bolus 1,000 mL (1,000 mLs Intravenous New Bag 6/29/18 0022)       New Prescriptions    CLINDAMYCIN (CLEOCIN) 150 MG CAPSULE    Take 3 capsules (450 mg total) by mouth every 8 (eight) hours. for 5 days       Follow-up Information     Rishabh Esteban MD In 2 days.    Specialty:  Family Medicine  Contact information:  [address]  [address]  [facility] [address]  [phone]                     Medical Decision Making              Scribe Attestation:   Scribe #1: I performed the above scribed service and the documentation accurately describes the services I performed. I attest to the accuracy of the note.    Attending:   Physician Attestation Statement for Scribe #1: I, Judd Rasmussen MD, personally performed the services described in this documentation, as scribed by Bryan Lim, in my presence, and it is both accurate and complete.          Clinical Impression       ICD-10-CM ICD-9-CM   1. Foot infection L08.9 686.9       Disposition:   Disposition: Discharged  Condition: Stable         Judd Rasmussen MD  06/29/18 0041

## 2018-06-29 NOTE — ED NOTES
Pt aware of bp and states he is ready to go. Pt also stating he does not need glucose checked again. Dr Rasmussen notified and states ok to discharge home.

## 2018-07-04 LAB
BACTERIA BLD CULT: NORMAL
BACTERIA BLD CULT: NORMAL

## 2018-07-06 ENCOUNTER — HOSPITAL ENCOUNTER (INPATIENT)
Facility: HOSPITAL | Age: 63
LOS: 4 days | Discharge: HOME | DRG: 264 | End: 2018-07-10
Attending: EMERGENCY MEDICINE | Admitting: INTERNAL MEDICINE
Payer: MEDICARE

## 2018-07-06 DIAGNOSIS — Z79.4 TYPE 2 DIABETES MELLITUS WITH DIABETIC NEUROPATHY, WITH LONG-TERM CURRENT USE OF INSULIN: ICD-10-CM

## 2018-07-06 DIAGNOSIS — Z79.899 LONG TERM CURRENT USE OF IMMUNOSUPPRESSIVE DRUG: ICD-10-CM

## 2018-07-06 DIAGNOSIS — I96 GANGRENE OF TOE OF LEFT FOOT: Primary | ICD-10-CM

## 2018-07-06 DIAGNOSIS — L08.9 DIABETIC FOOT INFECTION: ICD-10-CM

## 2018-07-06 DIAGNOSIS — I96 GANGRENE: ICD-10-CM

## 2018-07-06 DIAGNOSIS — I73.9 PVD (PERIPHERAL VASCULAR DISEASE): Chronic | ICD-10-CM

## 2018-07-06 DIAGNOSIS — E11.628 DIABETIC FOOT INFECTION: ICD-10-CM

## 2018-07-06 DIAGNOSIS — L08.9 LEFT FOOT INFECTION: ICD-10-CM

## 2018-07-06 DIAGNOSIS — Z94.0 KIDNEY TRANSPLANT RECIPIENT: ICD-10-CM

## 2018-07-06 DIAGNOSIS — E11.40 TYPE 2 DIABETES MELLITUS WITH DIABETIC NEUROPATHY, WITH LONG-TERM CURRENT USE OF INSULIN: ICD-10-CM

## 2018-07-06 LAB
ALBUMIN SERPL BCP-MCNC: 3.6 G/DL
ALP SERPL-CCNC: 99 U/L
ALT SERPL W/O P-5'-P-CCNC: 15 U/L
ANION GAP SERPL CALC-SCNC: 12 MMOL/L
APTT BLDCRRT: 23.9 SEC
AST SERPL-CCNC: 14 U/L
BASOPHILS # BLD AUTO: 0.01 K/UL
BASOPHILS NFR BLD: 0.2 %
BILIRUB SERPL-MCNC: 0.7 MG/DL
BUN SERPL-MCNC: 21 MG/DL
CALCIUM SERPL-MCNC: 9.6 MG/DL
CHLORIDE SERPL-SCNC: 102 MMOL/L
CO2 SERPL-SCNC: 21 MMOL/L
CREAT SERPL-MCNC: 1.9 MG/DL
CRP SERPL-MCNC: 9.6 MG/L
DIFFERENTIAL METHOD: ABNORMAL
EOSINOPHIL # BLD AUTO: 0.1 K/UL
EOSINOPHIL NFR BLD: 1.1 %
ERYTHROCYTE [DISTWIDTH] IN BLOOD BY AUTOMATED COUNT: 13.1 %
ERYTHROCYTE [SEDIMENTATION RATE] IN BLOOD BY WESTERGREN METHOD: 18 MM/HR
EST. GFR  (AFRICAN AMERICAN): 42 ML/MIN/1.73 M^2
EST. GFR  (NON AFRICAN AMERICAN): 36 ML/MIN/1.73 M^2
GLUCOSE SERPL-MCNC: 356 MG/DL
HCT VFR BLD AUTO: 45.6 %
HGB BLD-MCNC: 15.6 G/DL
INR PPP: 0.9
LACTATE SERPL-SCNC: 2.2 MMOL/L
LYMPHOCYTES # BLD AUTO: 1.4 K/UL
LYMPHOCYTES NFR BLD: 24.4 %
MCH RBC QN AUTO: 31.8 PG
MCHC RBC AUTO-ENTMCNC: 34.2 G/DL
MCV RBC AUTO: 93 FL
MONOCYTES # BLD AUTO: 0.5 K/UL
MONOCYTES NFR BLD: 9.1 %
NEUTROPHILS # BLD AUTO: 3.7 K/UL
NEUTROPHILS NFR BLD: 65.2 %
PLATELET # BLD AUTO: 185 K/UL
PMV BLD AUTO: 10.3 FL
POCT GLUCOSE: 191 MG/DL (ref 70–110)
POTASSIUM SERPL-SCNC: 5 MMOL/L
PROT SERPL-MCNC: 7.3 G/DL
PROTHROMBIN TIME: 9.8 SEC
RBC # BLD AUTO: 4.91 M/UL
SODIUM SERPL-SCNC: 135 MMOL/L
WBC # BLD AUTO: 5.62 K/UL

## 2018-07-06 PROCEDURE — 63600175 PHARM REV CODE 636 W HCPCS: Performed by: NURSE PRACTITIONER

## 2018-07-06 PROCEDURE — 80053 COMPREHEN METABOLIC PANEL: CPT

## 2018-07-06 PROCEDURE — 25000003 PHARM REV CODE 250: Performed by: NURSE PRACTITIONER

## 2018-07-06 PROCEDURE — 82962 GLUCOSE BLOOD TEST: CPT

## 2018-07-06 PROCEDURE — 11000001 HC ACUTE MED/SURG PRIVATE ROOM

## 2018-07-06 PROCEDURE — 87040 BLOOD CULTURE FOR BACTERIA: CPT

## 2018-07-06 PROCEDURE — 25000242 PHARM REV CODE 250 ALT 637 W/ HCPCS: Performed by: EMERGENCY MEDICINE

## 2018-07-06 PROCEDURE — 96365 THER/PROPH/DIAG IV INF INIT: CPT

## 2018-07-06 PROCEDURE — 83605 ASSAY OF LACTIC ACID: CPT

## 2018-07-06 PROCEDURE — 99285 EMERGENCY DEPT VISIT HI MDM: CPT | Mod: 25

## 2018-07-06 PROCEDURE — 99900031 HC PATIENT EDUCATION (STAT)

## 2018-07-06 PROCEDURE — 85730 THROMBOPLASTIN TIME PARTIAL: CPT

## 2018-07-06 PROCEDURE — 94640 AIRWAY INHALATION TREATMENT: CPT

## 2018-07-06 PROCEDURE — 85651 RBC SED RATE NONAUTOMATED: CPT

## 2018-07-06 PROCEDURE — 63600175 PHARM REV CODE 636 W HCPCS: Performed by: EMERGENCY MEDICINE

## 2018-07-06 PROCEDURE — 85025 COMPLETE CBC W/AUTO DIFF WBC: CPT

## 2018-07-06 PROCEDURE — 96372 THER/PROPH/DIAG INJ SC/IM: CPT

## 2018-07-06 PROCEDURE — 85610 PROTHROMBIN TIME: CPT

## 2018-07-06 PROCEDURE — 96375 TX/PRO/DX INJ NEW DRUG ADDON: CPT

## 2018-07-06 PROCEDURE — 25000003 PHARM REV CODE 250: Performed by: EMERGENCY MEDICINE

## 2018-07-06 PROCEDURE — 86140 C-REACTIVE PROTEIN: CPT

## 2018-07-06 PROCEDURE — 96366 THER/PROPH/DIAG IV INF ADDON: CPT

## 2018-07-06 PROCEDURE — 96367 TX/PROPH/DG ADDL SEQ IV INF: CPT

## 2018-07-06 RX ORDER — GLUCAGON 1 MG
1 KIT INJECTION
Status: DISCONTINUED | OUTPATIENT
Start: 2018-07-06 | End: 2018-07-10 | Stop reason: HOSPADM

## 2018-07-06 RX ORDER — SODIUM CHLORIDE 9 MG/ML
INJECTION, SOLUTION INTRAVENOUS CONTINUOUS
Status: DISCONTINUED | OUTPATIENT
Start: 2018-07-06 | End: 2018-07-10 | Stop reason: HOSPADM

## 2018-07-06 RX ORDER — SODIUM BICARBONATE 650 MG/1
2600 TABLET ORAL 2 TIMES DAILY
Status: DISCONTINUED | OUTPATIENT
Start: 2018-07-06 | End: 2018-07-10 | Stop reason: HOSPADM

## 2018-07-06 RX ORDER — ONDANSETRON 2 MG/ML
4 INJECTION INTRAMUSCULAR; INTRAVENOUS EVERY 8 HOURS PRN
Status: DISCONTINUED | OUTPATIENT
Start: 2018-07-06 | End: 2018-07-10 | Stop reason: HOSPADM

## 2018-07-06 RX ORDER — IBUPROFEN 200 MG
16 TABLET ORAL
Status: DISCONTINUED | OUTPATIENT
Start: 2018-07-06 | End: 2018-07-10 | Stop reason: HOSPADM

## 2018-07-06 RX ORDER — VANCOMYCIN HCL IN 5 % DEXTROSE 1G/250ML
1000 PLASTIC BAG, INJECTION (ML) INTRAVENOUS
Status: COMPLETED | OUTPATIENT
Start: 2018-07-06 | End: 2018-07-06

## 2018-07-06 RX ORDER — IBUPROFEN 200 MG
24 TABLET ORAL
Status: DISCONTINUED | OUTPATIENT
Start: 2018-07-06 | End: 2018-07-10 | Stop reason: HOSPADM

## 2018-07-06 RX ORDER — PREDNISONE 5 MG/1
5 TABLET ORAL DAILY
Status: DISCONTINUED | OUTPATIENT
Start: 2018-07-07 | End: 2018-07-10 | Stop reason: HOSPADM

## 2018-07-06 RX ORDER — ASPIRIN 81 MG/1
81 TABLET ORAL DAILY
Status: DISCONTINUED | OUTPATIENT
Start: 2018-07-07 | End: 2018-07-10 | Stop reason: HOSPADM

## 2018-07-06 RX ORDER — IBUPROFEN 200 MG
24 TABLET ORAL
Status: CANCELLED | OUTPATIENT
Start: 2018-07-06

## 2018-07-06 RX ORDER — GLUCAGON 1 MG
1 KIT INJECTION
Status: CANCELLED | OUTPATIENT
Start: 2018-07-06

## 2018-07-06 RX ORDER — BUDESONIDE 0.5 MG/2ML
0.5 INHALANT ORAL 2 TIMES DAILY
Status: DISCONTINUED | OUTPATIENT
Start: 2018-07-06 | End: 2018-07-10 | Stop reason: HOSPADM

## 2018-07-06 RX ORDER — ENOXAPARIN SODIUM 100 MG/ML
40 INJECTION SUBCUTANEOUS EVERY 24 HOURS
Status: CANCELLED | OUTPATIENT
Start: 2018-07-06

## 2018-07-06 RX ORDER — MORPHINE SULFATE 4 MG/ML
4 INJECTION, SOLUTION INTRAMUSCULAR; INTRAVENOUS EVERY 4 HOURS PRN
Status: DISCONTINUED | OUTPATIENT
Start: 2018-07-06 | End: 2018-07-08

## 2018-07-06 RX ORDER — AMLODIPINE BESYLATE 2.5 MG/1
2.5 TABLET ORAL DAILY
Status: DISCONTINUED | OUTPATIENT
Start: 2018-07-07 | End: 2018-07-09

## 2018-07-06 RX ORDER — FORMOTEROL FUMARATE DIHYDRATE 20 UG/2ML
20 SOLUTION RESPIRATORY (INHALATION) EVERY 12 HOURS
Status: DISCONTINUED | OUTPATIENT
Start: 2018-07-07 | End: 2018-07-06

## 2018-07-06 RX ORDER — IBUPROFEN 200 MG
16 TABLET ORAL
Status: CANCELLED | OUTPATIENT
Start: 2018-07-06

## 2018-07-06 RX ORDER — INSULIN ASPART 100 [IU]/ML
1-10 INJECTION, SOLUTION INTRAVENOUS; SUBCUTANEOUS
Status: DISCONTINUED | OUTPATIENT
Start: 2018-07-06 | End: 2018-07-10 | Stop reason: HOSPADM

## 2018-07-06 RX ORDER — VANCOMYCIN HCL IN 5 % DEXTROSE 1G/250ML
1000 PLASTIC BAG, INJECTION (ML) INTRAVENOUS
Status: DISCONTINUED | OUTPATIENT
Start: 2018-07-08 | End: 2018-07-07

## 2018-07-06 RX ORDER — INSULIN ASPART 100 [IU]/ML
0-5 INJECTION, SOLUTION INTRAVENOUS; SUBCUTANEOUS
Status: CANCELLED | OUTPATIENT
Start: 2018-07-06

## 2018-07-06 RX ORDER — MORPHINE SULFATE 4 MG/ML
2 INJECTION, SOLUTION INTRAMUSCULAR; INTRAVENOUS EVERY 4 HOURS PRN
Status: DISCONTINUED | OUTPATIENT
Start: 2018-07-06 | End: 2018-07-08

## 2018-07-06 RX ORDER — ARFORMOTEROL TARTRATE 15 UG/2ML
15 SOLUTION RESPIRATORY (INHALATION) 2 TIMES DAILY
Status: DISCONTINUED | OUTPATIENT
Start: 2018-07-06 | End: 2018-07-10 | Stop reason: HOSPADM

## 2018-07-06 RX ADMIN — ONDANSETRON 4 MG: 2 INJECTION INTRAMUSCULAR; INTRAVENOUS at 07:07

## 2018-07-06 RX ADMIN — SODIUM CHLORIDE: 0.9 INJECTION, SOLUTION INTRAVENOUS at 07:07

## 2018-07-06 RX ADMIN — MORPHINE SULFATE 2 MG: 4 INJECTION INTRAVENOUS at 07:07

## 2018-07-06 RX ADMIN — INSULIN ASPART 2 UNITS: 100 INJECTION, SOLUTION INTRAVENOUS; SUBCUTANEOUS at 07:07

## 2018-07-06 RX ADMIN — SODIUM BICARBONATE 2600 MG: 650 TABLET ORAL at 09:07

## 2018-07-06 RX ADMIN — PIPERACILLIN AND TAZOBACTAM 4.5 G: 4; .5 INJECTION, POWDER, LYOPHILIZED, FOR SOLUTION INTRAVENOUS; PARENTERAL at 07:07

## 2018-07-06 RX ADMIN — VANCOMYCIN HYDROCHLORIDE 1000 MG: 1 INJECTION, POWDER, LYOPHILIZED, FOR SOLUTION INTRAVENOUS at 07:07

## 2018-07-06 RX ADMIN — TACROLIMUS 1.5 MG: 1 CAPSULE ORAL at 11:07

## 2018-07-06 RX ADMIN — INSULIN DETEMIR 10 UNITS: 100 INJECTION, SOLUTION SUBCUTANEOUS at 07:07

## 2018-07-06 RX ADMIN — ARFORMOTEROL TARTRATE 15 MCG: 15 SOLUTION RESPIRATORY (INHALATION) at 08:07

## 2018-07-06 NOTE — ASSESSMENT & PLAN NOTE
Baseline 1.6 - 1.8  Nephrology consult as patient is renal transplant recipient taking Prograf and prednisone

## 2018-07-06 NOTE — ASSESSMENT & PLAN NOTE
Podiatry consult - spoke with Dr. Magaña in the ED  Likely will need amputation per Dr. Magaña  Empiric ABX with Vanco and Zosyn  Vascular consult to evaluate for possible intervention vs amputation  Neurovascular checks  NPO after MN for possible procedure in AM

## 2018-07-06 NOTE — ED PROVIDER NOTES
"SCRIBE #1 NOTE: I, Ambar Grissom, am scribing for, and in the presence of, Ilan Grissom Jr., MD. I have scribed the entire note.      History      Chief Complaint   Patient presents with    Foot Pain     left foot wound and infection       Review of patient's allergies indicates:  No Known Allergies     HPI   HPI    2018, 2:04 PM   History obtained from the patient      History of Present Illness: Lavelle Ladd is a 62 y.o. male patient HTN, PVD, DM, who presents to the Emergency Department for a L great toe wound which began 1 month ago after he clipped his toenail too deep.  He states that he has been seen here 4 times within the last month for the wound. He was prescribed Clindamycin at the last visit, which he finished yesterday. Sxs have not improved. He has an area on his great toe that is gangrenous. Pt reportedly had an angiogram weeks ago. He states "they found blockages and opened them up." Symptoms are constant and moderate in severity. No mitigating or exacerbating factors reported. Patient denies fever, chills, N/V, and all other sxs at this time. No further complaints or concerns at this time.     Arrival mode: Personal vehicle    PCP: Rishabh Esteban MD       Past Medical History:  Past Medical History:   Diagnosis Date    DINORAH (acute kidney injury) 2016    Arthritis     Chronic obstructive pulmonary disease 2016    Coronary artery disease involving native coronary artery of native heart without angina pectoris 2016     donor kidney transplant for DM 16     Induction with Thymo x3 and IV solumedrol to total 875mg  Kidney Biopsy  2016: 16 glomeruli, ACR type 1 AVR type 2, significant microcirculatory changes, c4d negative, No DSA, 5 to10% fibrosis. Treated with thymo x8 2016- no rejection      Diastolic heart failure     Encounter for blood transfusion     ESRD on RRT since 10/2013 10/29/2013    Biopsy proven diabetic nephropathy and lymphoplasmacytic " interstitial infiltrate not c/w with AIN (ddx sjogrens or assoc with tamm-horsefall protein extravasation)     GERD (gastroesophageal reflux disease)     History of hepatitis C, s/p successful Rx w/ SVR12 - 4/2017 4/5/2017    Completed 12 weeks harvoni w/ SVR    Hyperlipidemia     Hypertension     Prophylactic immunotherapy     Proteinuria     PVD (peripheral vascular disease) 6/26/2017    RLE BKA CT 12/11/16 Extensive atherosclerotic disease of the aorta and mesenteric arteries.     Renal hypertension     Type 2 diabetes mellitus with diabetic neuropathy, with long-term current use of insulin 12/1/2016    Vitamin B12 deficiency        Past Surgical History:  Past Surgical History:   Procedure Laterality Date    AORTOGRAPHY WITH SERIALOGRAPHY N/A 6/14/2018    Procedure: LEFT LEG ANGIOGRAM;  Surgeon: Donal Mcdonald MD;  Location: [facility] CATH LAB;  Service: Vascular;  Laterality: N/A;    av bovine graft      Left UE    AV FISTULA PLACEMENT      left UE    CARDIAC CATHETERIZATION  02/2015    KIDNEY TRANSPLANT  05/21/2016    LEG AMPUTATION THROUGH KNEE  2011    right LE, started as nail puncture leading to diabetic ulcer         Family History:  Family History   Problem Relation Age of Onset    Cancer Father     Diabetes Father     Heart failure Father     Stroke Father     Heart failure Mother     Kidney disease Neg Hx        Social History:  Social History     Social History Main Topics    Smoking status: Former Smoker     Packs/day: 1.00     Years: 40.00     Quit date: 1/11/2013    Smokeless tobacco: Never Used    Alcohol use 3.6 oz/week     6 Cans of beer per week      Comment: 6 beers/week    Drug use: No    Sexual activity: No       ROS   Review of Systems   Constitutional: Negative for chills and fever.   HENT: Negative for sore throat.    Respiratory: Negative for shortness of breath.    Cardiovascular: Negative for chest pain.   Gastrointestinal: Negative for abdominal pain, diarrhea,  "nausea and vomiting.   Genitourinary: Negative for dysuria.   Musculoskeletal: Negative for back pain.   Skin: Positive for color change and wound. Negative for rash.   Allergic/Immunologic: Positive for immunocompromised state.   Neurological: Negative for weakness and numbness.   Hematological: Does not bruise/bleed easily.   All other systems reviewed and are negative.      Physical Exam      Initial Vitals [07/06/18 1357]   BP Pulse Resp Temp SpO2   (!) 185/98 90 18 97.7 °F (36.5 °C) (!) 94 %      MAP       --          Physical Exam  Nursing Notes and Vital Signs Reviewed.  Constitutional: Patient is in no acute distress. Awake and alert. Well-developed and well-nourished.  Head: Atraumatic. Normocephalic.  Eyes: PERRL. EOM intact. Conjunctivae are not pale. No scleral icterus.  ENT: Mucous membranes are moist. Oropharynx is clear and symmetric.    Neck: Supple. Full ROM.  Cardiovascular: Regular rate. Regular rhythm. No murmurs, rubs, or gallops.   Pulmonary/Chest: No respiratory distress. Clear to auscultation bilaterally. No wheezing, rales, or rhonchi.  Abdominal: Soft and non-distended.  There is no tenderness.  Musculoskeletal: R BKA. No calf tenderness. No edema.   L foot: Gangrene to the L first toe with erythema surrounding the sole of foot. There is warmth and TTP. Minimally palpable pulse.  Skin: Warm and dry.   Neurological:  Alert, awake, and appropriate.  Normal speech.  No acute focal neurological deficits are appreciated.  Psychiatric: Normal affect. Good eye contact. Appropriate in content.    ED Course    Procedures  ED Vital Signs:  Vitals:    07/06/18 1357   BP: (!) 185/98   Pulse: 90   Resp: 18   Temp: 97.7 °F (36.5 °C)   TempSrc: Oral   SpO2: (!) 94%   Weight: 101.4 kg (223 lb 8.7 oz)   Height: 5' 11" (1.803 m)       Abnormal Lab Results:  Labs Reviewed   CBC W/ AUTO DIFFERENTIAL - Abnormal; Notable for the following:        Result Value    MCH 31.8 (*)     All other components within " normal limits   COMPREHENSIVE METABOLIC PANEL - Abnormal; Notable for the following:     Sodium 135 (*)     CO2 21 (*)     Glucose 356 (*)     Creatinine 1.9 (*)     eGFR if  42 (*)     eGFR if non  36 (*)     All other components within normal limits   C-REACTIVE PROTEIN - Abnormal; Notable for the following:     CRP 9.6 (*)     All other components within normal limits   LACTIC ACID, PLASMA   PROTIME-INR   APTT   SEDIMENTATION RATE        All Lab Results:  Results for orders placed or performed during the hospital encounter of 07/06/18   CBC auto differential   Result Value Ref Range    WBC 5.62 3.90 - 12.70 K/uL    RBC 4.91 4.60 - 6.20 M/uL    Hemoglobin 15.6 14.0 - 18.0 g/dL    Hematocrit 45.6 40.0 - 54.0 %    MCV 93 82 - 98 fL    MCH 31.8 (H) 27.0 - 31.0 pg    MCHC 34.2 32.0 - 36.0 g/dL    RDW 13.1 11.5 - 14.5 %    Platelets 185 150 - 350 K/uL    MPV 10.3 9.2 - 12.9 fL    Gran # (ANC) 3.7 1.8 - 7.7 K/uL    Lymph # 1.4 1.0 - 4.8 K/uL    Mono # 0.5 0.3 - 1.0 K/uL    Eos # 0.1 0.0 - 0.5 K/uL    Baso # 0.01 0.00 - 0.20 K/uL    Gran% 65.2 38.0 - 73.0 %    Lymph% 24.4 18.0 - 48.0 %    Mono% 9.1 4.0 - 15.0 %    Eosinophil% 1.1 0.0 - 8.0 %    Basophil% 0.2 0.0 - 1.9 %    Differential Method Automated    Comprehensive metabolic panel   Result Value Ref Range    Sodium 135 (L) 136 - 145 mmol/L    Potassium 5.0 3.5 - 5.1 mmol/L    Chloride 102 95 - 110 mmol/L    CO2 21 (L) 23 - 29 mmol/L    Glucose 356 (H) 70 - 110 mg/dL    BUN, Bld 21 8 - 23 mg/dL    Creatinine 1.9 (H) 0.5 - 1.4 mg/dL    Calcium 9.6 8.7 - 10.5 mg/dL    Total Protein 7.3 6.0 - 8.4 g/dL    Albumin 3.6 3.5 - 5.2 g/dL    Total Bilirubin 0.7 0.1 - 1.0 mg/dL    Alkaline Phosphatase 99 55 - 135 U/L    AST 14 10 - 40 U/L    ALT 15 10 - 44 U/L    Anion Gap 12 8 - 16 mmol/L    eGFR if African American 42 (A) >60 mL/min/1.73 m^2    eGFR if non African American 36 (A) >60 mL/min/1.73 m^2   C-reactive protein   Result Value Ref Range     CRP 9.6 (H) 0.0 - 8.2 mg/L   Lactic acid, plasma   Result Value Ref Range    Lactate (Lactic Acid) 2.2 0.5 - 2.2 mmol/L   Protime-INR   Result Value Ref Range    Prothrombin Time 9.8 9.0 - 12.5 sec    INR 0.9 0.8 - 1.2   APTT   Result Value Ref Range    aPTT 23.9 21.0 - 32.0 sec     *Note: Due to a large number of results and/or encounters for the requested time period, some results have not been displayed. A complete set of results can be found in Results Review.     Imaging Results:  Imaging Results          US Lower Extremity Arteries Left (Final result)  Result time 07/06/18 17:53:04    Final result by Porter Young MD (07/06/18 17:53:04)                 Impression:      1. Diffuse atherosclerotic change of the left lower extremity arteries.  2. No findings suspicious for focal stenosis or occlusion.      Electronically signed by: Porter Young MD  Date:    07/06/2018  Time:    17:53             Narrative:    EXAMINATION:  US LOWER EXTREMITY ARTERIES LEFT    CLINICAL HISTORY:  LEFT leg infection; peripheral vascular disease.  History of previous left lower extremity angioplasty.    COMPARISON:  None    FINDINGS:  Left lower extremity arterial Doppler evaluation demonstrates diffuse atherosclerotic changes.  Velocities within the common femoral, superficial femoral, popliteal and deep profunda are within normal limits without evidence of focal stenosis or occlusion.  Anterior tibial, posterior tibial and dorsalis pedis arteries are patent with slightly diminished flow, but no evidence of focal stenosis or occlusion.                               X-Ray Foot Complete Left (Final result)  Result time 07/06/18 15:57:20    Final result by Porter Armas MD (07/06/18 15:57:20)                 Impression:      No acute findings.  No change in the incompletely healed fracture of the base of the proximal phalanx of the 2nd toe.      Electronically signed by: Porter Armas  MD  Date:    07/06/2018  Time:    15:57             Narrative:    EXAMINATION:  XR FOOT COMPLETE 3 VIEW LEFT    CLINICAL HISTORY:  Local infection of the skin and subcutaneous tissue, unspecified    COMPARISON:  06/28/2018    FINDINGS:  There is unchanged non healed fracture of the base of the proximal phalanx of the 2nd toe.  No acute bony or joint abnormality is seen.  Extensive arterial calcifications.  No air in the soft tissues.                               The Emergency Provider reviewed the vital signs and test results, which are outlined above.    ED Discussion     5:40 PM: Discussed case with Gloria Pavon NP ([facility]). Dr. Mathias agrees with current care and management of pt and accepts admission.   Admitting Service: [facility] medicine   Admitting Physician: Dr. Mathias  Admit to: Med surg    6:09 PM: Re-evaluated pt. I have discussed test results, shared treatment plan, and the need for admission with patient and family at bedside. Pt and family express understanding at this time and agree with all information. All questions answered. Pt and family have no further questions or concerns at this time. Pt is ready for admit.    ED Medication(s):  Medications - No data to display    New Prescriptions    No medications on file            Medical Decision Making    Medical Decision Making:   Clinical Tests:   Lab Tests: Ordered and Reviewed  Radiological Study: Ordered and Reviewed           Scribe Attestation:   Scribe #1: I performed the above scribed service and the documentation accurately describes the services I performed. I attest to the accuracy of the note.    Attending:   Physician Attestation Statement for Scribe #1: I, Ilan Grissom Jr., MD, personally performed the services described in this documentation, as scribed by Ambar Grissom, in my presence, and it is both accurate and complete.          Clinical Impression       ICD-10-CM ICD-9-CM   1. Gangrene I96 785.4   2. Left foot infection L08.9  686.9   3. Long term current use of immunosuppressive drug Z79.899 V58.69   4. Kidney transplant recipient Z94.0 V42.0   5. Type 2 diabetes mellitus with diabetic neuropathy, with long-term current use of insulin E11.40 250.60    Z79.4 357.2     V58.67   6. PVD (peripheral vascular disease) I73.9 443.9   7. Diabetic foot infection E11.628 250.80    L08.9 686.9       Disposition:   Disposition: Admitted (Med-surg)  Condition: Kurt Grissom Jr., MD  07/06/18 2030

## 2018-07-06 NOTE — ED NOTES
Patient reports coming to ER multiple previous times for right foot wound/ ulceration.    Patient identifiers verified and correct for Lavelle Ladd.    LOC: The patient is awake, alert and aware of environment with an appropriate affect, the patient is oriented x 3 and speaking appropriately.  APPEARANCE: Patient resting comfortably and in no acute distress, patient is clean and well groomed, patient's clothing is properly fastened.  SKIN: The skin is warm and dry, color consistent with ethnicity, patient has normal skin turgor and moist mucus membranes, no bruising noted. Wound to left great toe with eschar present; redness and swelling to toes of left foot.  MUSCULOSKELETAL: Patient moving all extremities spontaneously.  Above knee amputation to right leg, prosthetic in place.  RESPIRATORY: Airway is open and patent, respirations are spontaneous.  CARDIAC: Patient has a normal rate, no periphreal edema noted, capillary refill < 3 seconds.  ABDOMEN: Soft and non tender to palpation.

## 2018-07-06 NOTE — HPI
Lavelle Ladd is a 62 year old male with type II diabetes mellitus, diabetic neuropathy, chronic back pain, right above the knee amputation and history of kidney transplant on 5/21/16, on Prograf and prednisone, who presents to the ED for an darkened wound on his left distal hallux. He describes increased pain and swelling to the left foot. He states the area of eschar has increased, the nail has fallen off and there is clear fluid drainage from the wound. He denies fever, chills.          Arterial US indicates normal velocities within the common femoral, superficial femoral, popliteal and deep profunda - no focal stenosis or occlusion.          Plain film shows a non healed fracture of the base of the proximal phalanx of the 2nd toe, no air in the soft tissues. CBC is unremarkable, CMP shows hyponatremia, CKD stage 3 (baseline creatinine 1.6 - 1.8), glucose 356, CRP 9.6, lactate 2.2. Pt was admitted in early June for toe infection, treated with ABX and underwent angiogram performed by Dr. Mcdonald.  (tibial and posterior tibial artery atherectomy with a balloon angioplasty and a left SFA atherectomy with balloon angioplasty). He was discharged on Keflex. Mr. Ladd returned to the ED on 6/29 for toe infection and prescribed Clindamycin.

## 2018-07-06 NOTE — ASSESSMENT & PLAN NOTE
Early June/2018  underwent angiogram performed by Dr. Mcdonald to left leg (tibial and posterior tibial artery atherectomy with a balloon angioplasty and a left SFA atherectomy with balloon angioplasty).   Vascular consult - ? Intervention vs amputation

## 2018-07-07 LAB
ALBUMIN SERPL BCP-MCNC: 3.3 G/DL
ALP SERPL-CCNC: 77 U/L
ALT SERPL W/O P-5'-P-CCNC: 13 U/L
ANION GAP SERPL CALC-SCNC: 12 MMOL/L
AST SERPL-CCNC: 10 U/L
BACTERIA #/AREA URNS HPF: NORMAL /HPF
BASOPHILS # BLD AUTO: 0.01 K/UL
BASOPHILS NFR BLD: 0.2 %
BILIRUB SERPL-MCNC: 0.6 MG/DL
BILIRUB UR QL STRIP: NEGATIVE
BUN SERPL-MCNC: 22 MG/DL
CALCIUM SERPL-MCNC: 9.4 MG/DL
CHLORIDE SERPL-SCNC: 107 MMOL/L
CLARITY UR: CLEAR
CO2 SERPL-SCNC: 22 MMOL/L
COLOR UR: YELLOW
CREAT SERPL-MCNC: 1.7 MG/DL
DIFFERENTIAL METHOD: ABNORMAL
EOSINOPHIL # BLD AUTO: 0.1 K/UL
EOSINOPHIL NFR BLD: 1.7 %
ERYTHROCYTE [DISTWIDTH] IN BLOOD BY AUTOMATED COUNT: 13.3 %
EST. GFR  (AFRICAN AMERICAN): 48 ML/MIN/1.73 M^2
EST. GFR  (NON AFRICAN AMERICAN): 42 ML/MIN/1.73 M^2
ESTIMATED AVG GLUCOSE: 246 MG/DL
GLUCOSE SERPL-MCNC: 112 MG/DL
GLUCOSE UR QL STRIP: ABNORMAL
HBA1C MFR BLD HPLC: 10.2 %
HCT VFR BLD AUTO: 45.2 %
HGB BLD-MCNC: 15.3 G/DL
HGB UR QL STRIP: ABNORMAL
HYALINE CASTS #/AREA URNS LPF: 0 /LPF
KETONES UR QL STRIP: NEGATIVE
LEUKOCYTE ESTERASE UR QL STRIP: NEGATIVE
LYMPHOCYTES # BLD AUTO: 1.5 K/UL
LYMPHOCYTES NFR BLD: 26.9 %
MCH RBC QN AUTO: 31.7 PG
MCHC RBC AUTO-ENTMCNC: 33.8 G/DL
MCV RBC AUTO: 94 FL
MICROSCOPIC COMMENT: NORMAL
MONOCYTES # BLD AUTO: 0.7 K/UL
MONOCYTES NFR BLD: 13.1 %
NEUTROPHILS # BLD AUTO: 3.2 K/UL
NEUTROPHILS NFR BLD: 58.1 %
NITRITE UR QL STRIP: NEGATIVE
PH UR STRIP: 6 [PH] (ref 5–8)
PLATELET # BLD AUTO: 190 K/UL
PMV BLD AUTO: 10.3 FL
POCT GLUCOSE: 112 MG/DL (ref 70–110)
POCT GLUCOSE: 150 MG/DL (ref 70–110)
POCT GLUCOSE: 250 MG/DL (ref 70–110)
POCT GLUCOSE: 265 MG/DL (ref 70–110)
POTASSIUM SERPL-SCNC: 4.1 MMOL/L
PROT SERPL-MCNC: 6.7 G/DL
PROT UR QL STRIP: ABNORMAL
RBC # BLD AUTO: 4.82 M/UL
RBC #/AREA URNS HPF: 3 /HPF (ref 0–4)
SODIUM SERPL-SCNC: 141 MMOL/L
SP GR UR STRIP: >=1.03 (ref 1–1.03)
SQUAMOUS #/AREA URNS HPF: 3 /HPF
URN SPEC COLLECT METH UR: ABNORMAL
UROBILINOGEN UR STRIP-ACNC: NEGATIVE EU/DL
VANCOMYCIN SERPL-MCNC: 9.2 UG/ML
WBC # BLD AUTO: 5.43 K/UL
WBC #/AREA URNS HPF: 1 /HPF (ref 0–5)

## 2018-07-07 PROCEDURE — 25000242 PHARM REV CODE 250 ALT 637 W/ HCPCS: Performed by: EMERGENCY MEDICINE

## 2018-07-07 PROCEDURE — 94640 AIRWAY INHALATION TREATMENT: CPT

## 2018-07-07 PROCEDURE — 63600175 PHARM REV CODE 636 W HCPCS: Performed by: NURSE PRACTITIONER

## 2018-07-07 PROCEDURE — 63600175 PHARM REV CODE 636 W HCPCS: Performed by: EMERGENCY MEDICINE

## 2018-07-07 PROCEDURE — 99223 1ST HOSP IP/OBS HIGH 75: CPT | Mod: ,,, | Performed by: INTERNAL MEDICINE

## 2018-07-07 PROCEDURE — 25000003 PHARM REV CODE 250: Performed by: PODIATRIST

## 2018-07-07 PROCEDURE — 81000 URINALYSIS NONAUTO W/SCOPE: CPT

## 2018-07-07 PROCEDURE — 25000003 PHARM REV CODE 250: Performed by: NURSE PRACTITIONER

## 2018-07-07 PROCEDURE — 25000003 PHARM REV CODE 250: Performed by: INTERNAL MEDICINE

## 2018-07-07 PROCEDURE — 83036 HEMOGLOBIN GLYCOSYLATED A1C: CPT

## 2018-07-07 PROCEDURE — 25000242 PHARM REV CODE 250 ALT 637 W/ HCPCS: Performed by: NURSE PRACTITIONER

## 2018-07-07 PROCEDURE — 80202 ASSAY OF VANCOMYCIN: CPT

## 2018-07-07 PROCEDURE — 96372 THER/PROPH/DIAG INJ SC/IM: CPT

## 2018-07-07 PROCEDURE — 85025 COMPLETE CBC W/AUTO DIFF WBC: CPT

## 2018-07-07 PROCEDURE — 63600175 PHARM REV CODE 636 W HCPCS: Performed by: INTERNAL MEDICINE

## 2018-07-07 PROCEDURE — 80053 COMPREHEN METABOLIC PANEL: CPT

## 2018-07-07 PROCEDURE — 11000001 HC ACUTE MED/SURG PRIVATE ROOM

## 2018-07-07 RX ORDER — VANCOMYCIN HCL IN 5 % DEXTROSE 1G/250ML
1000 PLASTIC BAG, INJECTION (ML) INTRAVENOUS
Status: DISCONTINUED | OUTPATIENT
Start: 2018-07-14 | End: 2018-07-08

## 2018-07-07 RX ORDER — SILVER SULFADIAZINE 10 G/1000G
CREAM TOPICAL DAILY
Status: DISCONTINUED | OUTPATIENT
Start: 2018-07-07 | End: 2018-07-09

## 2018-07-07 RX ADMIN — MORPHINE SULFATE 4 MG: 4 INJECTION INTRAVENOUS at 05:07

## 2018-07-07 RX ADMIN — BUDESONIDE 0.5 MG: 0.5 SUSPENSION RESPIRATORY (INHALATION) at 08:07

## 2018-07-07 RX ADMIN — ASPIRIN 81 MG: 81 TABLET, COATED ORAL at 08:07

## 2018-07-07 RX ADMIN — ARFORMOTEROL TARTRATE 15 MCG: 15 SOLUTION RESPIRATORY (INHALATION) at 08:07

## 2018-07-07 RX ADMIN — TACROLIMUS 1.5 MG: 1 CAPSULE ORAL at 08:07

## 2018-07-07 RX ADMIN — ARFORMOTEROL TARTRATE 15 MCG: 15 SOLUTION RESPIRATORY (INHALATION) at 07:07

## 2018-07-07 RX ADMIN — SODIUM BICARBONATE 2600 MG: 650 TABLET ORAL at 08:07

## 2018-07-07 RX ADMIN — SILVER SULFADIAZINE: 10 CREAM TOPICAL at 04:07

## 2018-07-07 RX ADMIN — MORPHINE SULFATE 4 MG: 4 INJECTION INTRAVENOUS at 02:07

## 2018-07-07 RX ADMIN — SODIUM BICARBONATE 2600 MG: 650 TABLET ORAL at 09:07

## 2018-07-07 RX ADMIN — VANCOMYCIN HYDROCHLORIDE 1250 MG: 10 INJECTION, POWDER, LYOPHILIZED, FOR SOLUTION INTRAVENOUS at 08:07

## 2018-07-07 RX ADMIN — PREDNISONE 5 MG: 5 TABLET ORAL at 08:07

## 2018-07-07 RX ADMIN — TACROLIMUS 1.5 MG: 1 CAPSULE ORAL at 05:07

## 2018-07-07 RX ADMIN — INSULIN DETEMIR 10 UNITS: 100 INJECTION, SOLUTION SUBCUTANEOUS at 09:07

## 2018-07-07 RX ADMIN — MORPHINE SULFATE 4 MG: 4 INJECTION INTRAVENOUS at 01:07

## 2018-07-07 RX ADMIN — PIPERACILLIN SODIUM AND TAZOBACTAM SODIUM 4.5 G: 4; .5 INJECTION, POWDER, LYOPHILIZED, FOR SOLUTION INTRAVENOUS at 10:07

## 2018-07-07 RX ADMIN — MORPHINE SULFATE 4 MG: 4 INJECTION INTRAVENOUS at 07:07

## 2018-07-07 RX ADMIN — BUDESONIDE 0.5 MG: 0.5 SUSPENSION RESPIRATORY (INHALATION) at 07:07

## 2018-07-07 RX ADMIN — MORPHINE SULFATE 4 MG: 4 INJECTION INTRAVENOUS at 11:07

## 2018-07-07 RX ADMIN — AMLODIPINE BESYLATE 2.5 MG: 2.5 TABLET ORAL at 09:07

## 2018-07-07 RX ADMIN — PIPERACILLIN SODIUM AND TAZOBACTAM SODIUM 4.5 G: 4; .5 INJECTION, POWDER, LYOPHILIZED, FOR SOLUTION INTRAVENOUS at 05:07

## 2018-07-07 RX ADMIN — INSULIN ASPART 2 UNITS: 100 INJECTION, SOLUTION INTRAVENOUS; SUBCUTANEOUS at 09:07

## 2018-07-07 RX ADMIN — INSULIN ASPART 6 UNITS: 100 INJECTION, SOLUTION INTRAVENOUS; SUBCUTANEOUS at 05:07

## 2018-07-07 RX ADMIN — MORPHINE SULFATE 4 MG: 4 INJECTION INTRAVENOUS at 10:07

## 2018-07-07 NOTE — HOSPITAL COURSE
On 7/6 patient was admitted to Medicine secondary to Gangrene of Left great toe.  Xray of left foot showed no acute findings.  No change in the incompletely healed fracture of the base of the proximal phalanx of the 2nd toe.  Arterial US showed diffuse atherosclerotic change of the left lower extremity arteries.  No findings suspicious for focal stenosis or occlusion.  He has a h/o PVD s/p Angiogram with balloon angioplasty and atherectomy to LLE per Dr. Mcdonald in June.  Podiatry consulted in the ER and per Dr. Magaña he will likely need amputation.  Vascular consult pending.  Started on Vanc and Zosyn.  Nephrology consulted given h/o kidney transplant.  BC continue to show NGTD.  As of 7/8 patient has been evaluated by vascular surgeon Dr. Uribe.  Per vascular, left foot arterial blood flow is adequate.  Ok for podiatry to proceed with OR for aggressive local debridement/amputation.  Reconsult if any further assistance is required.  Will d/w Dr. Magaña to determine POC moving forward.  Continue local care per podiatry recs for now.  As of 7/9 patient is s/p I and D per Dr. Magaña today.  Wound culture pending.  As of 7/10 patient evaluated by Dr. Guthrie with podiatry and cleared for DC from their standpoint.  Per podiatry patient will continue local wound care daily and was instructed to f/u with Dr. Magaña later this week for a wound re-check and verbalized + understanding.  Per podiatry, patient can complete 7 days of oral Zyvox to cover for MRSA.  BC and wound culture continues to show NGTD.  Case d/w Dr. Mueller.  Patient seen and examined and deemed medically stable to DC home today.  He was offered home health to continue wound care, but declined.  He will be instructed on daily wound care per nursing prior to DC.  Medications reconicled for DC home.  Also instructed to f/u with his PCP within 3 days and with Nephrology within 2-3 weeks and verbalized + understanding.

## 2018-07-07 NOTE — PLAN OF CARE
Problem: Patient Care Overview  Goal: Plan of Care Review  Outcome: Ongoing (interventions implemented as appropriate)  Remains free from harm, pain controlled via IV pain meds, refuses assistance and bed alarm, will continue to monitor. 24 hour chart check complete.

## 2018-07-07 NOTE — ASSESSMENT & PLAN NOTE
- Baseline Cr 1.6 - 1.8  - Nephrology consulted as patient is renal transplant recipient taking Prograf and prednisone

## 2018-07-07 NOTE — PROGRESS NOTES
Clinical Pharmacy Progress Note: Vancomycin Dosing     Vancomycin Day #2  Estimated Creatinine Clearance: 53.2 mL/min (A) (based on SCr of 1.7 mg/dL (H)).   SCr trend: (decreased x 1 day)   Lab Results   Component Value Date    WBC 5.43 07/07/2018   WBC trend: (stable x 1 day)         Tmax/24h: 98.2   Level:   Vancomycin, random [[id number]] Collected: 07/07/18 0432   Specimen: Blood from Blood Updated: 07/07/18 0727    Vancomycin, Random 9.2 ug/mL    Goal trough: 15-20 mcg/mL   Indication: left foot gangrene, likely will require amputation per Podiatry      PMH of T2DM, kidney transplant on immunosuppressive therapy, right AKA, HTN, CKD, hx of ESBL infection, Hep C  Cultures: Blood on 7/6- NGTD   Plan: Pharmacy will continue current vancomycin pulse dose approach. Patient will receive vancomycin 1250 mg (15 mg/Kg adjusted body weight) dose today. Will re-dose patient when random level < 18 mcg/mL   Please note: A vancomycin 1000 mg IV every 48 hours placeholder dose only has been entered.    Next level due:  Vancomycin random due 07/08/18 @ 0600 with AM labs.     Thank you for allowing us to participate in this patient's care.    Radha Lopez, PharmD 7/7/2018 9:20 AM

## 2018-07-07 NOTE — PROGRESS NOTES
[facility] Hospital Medicine  Progress Note    Patient Name: Lavelle Ladd  MRN: [id number]  Patient Class: IP- Inpatient   Admission Date: 7/6/2018  Length of Stay: 1 days  Attending Physician: Gabi Mathias MD  Primary Care Provider: Rishabh Esteban MD        Subjective:     Principal Problem:Gangrene of toe of left foot    HPI:  Lavelle Ladd is a 62 year old male with type II diabetes mellitus, diabetic neuropathy, chronic back pain, right above the knee amputation and history of kidney transplant on 5/21/16, on Prograf and prednisone, who presents to the ED for an darkened wound on his left distal hallux. He describes increased pain and swelling to the left foot. He states the area of eschar has increased, the nail has fallen off and there is clear fluid drainage from the wound. He denies fever, chills.          Arterial US indicates normal velocities within the common femoral, superficial femoral, popliteal and deep profunda - no focal stenosis or occlusion.          Plain film shows a non healed fracture of the base of the proximal phalanx of the 2nd toe, no air in the soft tissues. CBC is unremarkable, CMP shows hyponatremia, CKD stage 3 (baseline creatinine 1.6 - 1.8), glucose 356, CRP 9.6, lactate 2.2. Pt was admitted in early June for toe infection, treated with ABX and underwent angiogram performed by Dr. Mcdonald.  (tibial and posterior tibial artery atherectomy with a balloon angioplasty and a left SFA atherectomy with balloon angioplasty). He was discharged on Keflex. Mr. Ladd returned to the ED on 6/29 for toe infection and prescribed Clindamycin.           Hospital Course:  On 7/6 patient was admitted to Medicine secondary to Gangrene of Left great toe.  Xray of left foot showed no acute findings.  No change in the incompletely healed fracture of the base of the proximal phalanx of the 2nd toe.  Arterial US showed diffuse atherosclerotic change of the left lower extremity  arteries.  No findings suspicious for focal stenosis or occlusion.  He has a h/o PVD s/p Angiogram with balloon angioplasty and atherectomy to LLE per Dr. Mcdonald in June.  Podiatry consulted in the ER and per Dr. Magaña he will likely need amputation.  Vascular consult pending.  Started on Vanc and Zosyn.  Nephrology consulted given h/o kidney transplant.  BC show NGTD.      Interval History: No acute events overnight.  Resting comfortably in bed.  Vascular consult pending.  Continue IV ABX.      Review of Systems   Constitutional: Negative for activity change, appetite change, chills, fatigue and fever.   HENT: Negative.  Negative for congestion.    Eyes: Negative for pain, redness and visual disturbance.   Respiratory: Negative for cough, shortness of breath and wheezing.    Cardiovascular: Positive for leg swelling. Negative for chest pain and palpitations.   Gastrointestinal: Negative for abdominal pain, constipation, diarrhea, nausea and vomiting.   Genitourinary: Negative.    Musculoskeletal: Positive for back pain and gait problem.   Skin: Positive for color change and wound.   Neurological: Positive for numbness. Negative for dizziness and light-headedness.        + numbness to bilateral feet   Psychiatric/Behavioral: Negative for agitation and confusion. The patient is not nervous/anxious.      Objective:     Vital Signs (Most Recent):  Temp: 97.7 °F (36.5 °C) (07/07/18 1201)  Pulse: 69 (07/07/18 1201)  Resp: 18 (07/07/18 1201)  BP: 126/82 (07/07/18 1201)  SpO2: 95 % (07/07/18 1201) Vital Signs (24h Range):  Temp:  [97.6 °F (36.4 °C)-98.2 °F (36.8 °C)] 97.7 °F (36.5 °C)  Pulse:  [69-93] 69  Resp:  [16-33] 18  SpO2:  [93 %-99 %] 95 %  BP: (115-202)/() 126/82     Weight: 101.4 kg (223 lb 8.7 oz)  Body mass index is 31.18 kg/m².    Intake/Output Summary (Last 24 hours) at 07/07/18 1429  Last data filed at 07/07/18 1300   Gross per 24 hour   Intake             1990 ml   Output             1150 ml   Net               840 ml      Physical Exam   Constitutional: He is oriented to person, place, and time. He appears well-developed and well-nourished. No distress.   HENT:   Head: Normocephalic and atraumatic.   Eyes: Conjunctivae and EOM are normal. Right eye exhibits no discharge.   Neck: Normal range of motion. Neck supple. No tracheal deviation present. No thyromegaly present.   Cardiovascular: Normal rate, regular rhythm and normal heart sounds.  Exam reveals no gallop and no friction rub.    No murmur heard.  Left PT and DP pulses audible per doppler   Pulmonary/Chest: Effort normal and breath sounds normal. He has no wheezes. He has no rales.   Abdominal: Soft. Bowel sounds are normal. He exhibits no mass. There is no tenderness. There is no rebound and no guarding.   Musculoskeletal: He exhibits edema.   Left toe gangrene , s/p right BKA, mild edema to left foot   Lymphadenopathy:     He has no cervical adenopathy.   Neurological: He is alert and oriented to person, place, and time.   Skin: Skin is warm. No rash noted. He is not diaphoretic. There is erythema.   Eschar to distal left great toe, nail is removed from nailbed, mild erythema present to the toe   Psychiatric: He has a normal mood and affect. His behavior is normal.   Nursing note and vitals reviewed.              Significant Labs:   A1C:   Recent Labs  Lab 06/12/18  0901 07/07/18  0432   HGBA1C 9.9* 10.2*     Blood Culture:   Recent Labs  Lab 07/06/18  1800 07/06/18  1837   LABBLOO No Growth to date No Growth to date     CBC:   Recent Labs  Lab 07/06/18  1521 07/07/18  0432   WBC 5.62 5.43   HGB 15.6 15.3   HCT 45.6 45.2    190     CMP:   Recent Labs  Lab 07/06/18  1521 07/07/18  0432   * 141   K 5.0 4.1    107   CO2 21* 22*   * 112*   BUN 21 22   CREATININE 1.9* 1.7*   CALCIUM 9.6 9.4   PROT 7.3 6.7   ALBUMIN 3.6 3.3*   BILITOT 0.7 0.6   ALKPHOS 99 77   AST 14 10   ALT 15 13   ANIONGAP 12 12   EGFRNONAA 36* 42*       Significant  Imaging: I have reviewed all pertinent imaging results/findings within the past 24 hours.    Assessment/Plan:      * Gangrene of toe of left foot    - Admitted to Medicine  - Podiatry consulted from the ER; per Dr. Magaña will likely need amputation  - Vascular consult pending  - Continue empiric ABX with Vanco and Zosyn  - Local wound care per podiatry recs  - Neurovascular checks  - BC show NGTD  - Daily labs  - Monitor        Diabetic foot infection    - See #1          Peripheral vascular disease    -S/P angiogram performed by Dr. Mcdonald in early June to left leg (tibial and posterior tibial artery atherectomy with a balloon angioplasty and a left SFA atherectomy with balloon angioplasty).   - Vascular consult pending   - Continue ASA         Kidney transplant recipient    - Nephrology consulted  - Continue Prograf and Prednisone  - Monitor Prograf levels          Chronic kidney disease (CKD), stage III (moderate)    - Baseline Cr 1.6 - 1.8  - Nephrology consulted as patient is renal transplant recipient taking Prograf and prednisone          Type 2 diabetes mellitus with hyperglycemia, with long-term current use of insulin    - A1c 10.2  - Continue Detemir  - Accu checks with sliding scale insulin prn  - Diabetic diet          Chronic bilateral low back pain with left-sided sciatica    - PRN analgesia  - Monitor          Long term current use of immunosuppressive drug    - Continue Prednisone and Prograf per Nephrology             Essential hypertension    - BP labile  - Continue Norvasc  - Monitor            VTE Risk Mitigation         Ordered     IP VTE HIGH RISK PATIENT  Once      07/06/18 1914     Reason for no Mechanical VTE Prophylaxis  Once     Hold pharmacological AC pending vascular consult and potential need for surgery.  07/06/18 1914   Place SCD to ARELIS Robbins, MARCELINO, ACNP-BC  Department of Hospital Medicine   [facility]

## 2018-07-07 NOTE — SUBJECTIVE & OBJECTIVE
Past Medical History:   Diagnosis Date    DINORAH (acute kidney injury) 2016    Arthritis     Chronic obstructive pulmonary disease 2016    Coronary artery disease involving native coronary artery of native heart without angina pectoris 2016     donor kidney transplant for DM 16     Induction with Thymo x3 and IV solumedrol to total 875mg  Kidney Biopsy  2016: 16 glomeruli, ACR type 1 AVR type 2, significant microcirculatory changes, c4d negative, No DSA, 5 to10% fibrosis. Treated with thymo x8 2016- no rejection      Diastolic heart failure     Encounter for blood transfusion     ESRD on RRT since 10/2013 10/29/2013    Biopsy proven diabetic nephropathy and lymphoplasmacytic interstitial infiltrate not c/w with AIN (ddx sjogrens or assoc with tamm-horsefall protein extravasation)     GERD (gastroesophageal reflux disease)     History of hepatitis C, s/p successful Rx w/ SVR12 - 2017    Completed 12 weeks harvoni w/ SVR    Hyperlipidemia     Hypertension     Prophylactic immunotherapy     Proteinuria     PVD (peripheral vascular disease) 2017    RLE BKA CT 16 Extensive atherosclerotic disease of the aorta and mesenteric arteries.     Renal hypertension     Type 2 diabetes mellitus with diabetic neuropathy, with long-term current use of insulin 2016    Vitamin B12 deficiency        Past Surgical History:   Procedure Laterality Date    AORTOGRAPHY WITH SERIALOGRAPHY N/A 2018    Procedure: LEFT LEG ANGIOGRAM;  Surgeon: Donal Mcdonald MD;  [facility] C[facility] LAB;  Service: Vascular;  Laterality: N/A;    av bovine graft      Left UE    AV FISTULA PLACEMENT      left UE    CARDIAC CATHETERIZATION  2015    KIDNEY TRANSPLANT  2016    LEG AMPUTATION THROUGH KNEE      right LE, started as nail puncture leading to diabetic ulcer       Review of patient's allergies indicates:  No Known Allergies  Current Facility-Administered  Medications   Medication Frequency    0.9%  NaCl infusion Continuous    amLODIPine tablet 2.5 mg Daily    arformoterol nebulizer solution 15 mcg BID    aspirin EC tablet 81 mg Daily    budesonide nebulizer solution 0.5 mg BID    dextrose 50% injection 12.5 g PRN    dextrose 50% injection 25 g PRN    glucagon (human recombinant) injection 1 mg PRN    glucose chewable tablet 16 g PRN    glucose chewable tablet 24 g PRN    insulin aspart U-100 pen 1-10 Units QID (AC + HS) PRN    insulin detemir U-100 pen 10 Units QHS    morphine injection 2 mg Q4H PRN    morphine injection 4 mg Q4H PRN    ondansetron injection 4 mg Q8H PRN    piperacillin-tazobactam 4.5 g in dextrose 5 % 100 mL IVPB (ready to mix system) Q8H    predniSONE tablet 5 mg Daily    silver sulfADIAZINE 1% cream Daily    sodium bicarbonate tablet 2,600 mg BID    tacrolimus capsule 1.5 mg BID    [START ON 7/14/2018] vancomycin in dextrose 5 % 1 gram/250 mL IVPB 1,000 mg Q48H     Family History     Problem Relation (Age of Onset)    Cancer Father    Diabetes Father    Heart failure Father, Mother    Stroke Father        Social History Main Topics    Smoking status: Former Smoker     Packs/day: 1.00     Years: 40.00     Quit date: 1/11/2013    Smokeless tobacco: Never Used    Alcohol use 3.6 oz/week     6 Cans of beer per week      Comment: 6 beers/week    Drug use: No    Sexual activity: No     Review of Systems   Constitutional: Positive for fatigue. Negative for activity change and appetite change.   HENT: Negative for congestion and facial swelling.    Eyes: Negative for pain, discharge and redness.   Respiratory: Negative for apnea, cough and chest tightness.    Cardiovascular: Negative for chest pain, palpitations and leg swelling.   Gastrointestinal: Negative for abdominal distention.   Genitourinary: Negative for difficulty urinating, dysuria and frequency.   Musculoskeletal: Negative for neck pain and neck stiffness.   Skin:  Negative for color change, rash and wound.   Neurological: Negative for dizziness, weakness and numbness.   Psychiatric/Behavioral: Negative for sleep disturbance.   All other systems reviewed and are negative.    Objective:     Vital Signs (Most Recent):  Temp: 97.7 °F (36.5 °C) (07/07/18 1201)  Pulse: 69 (07/07/18 1201)  Resp: 18 (07/07/18 1201)  BP: 126/82 (07/07/18 1201)  SpO2: 95 % (07/07/18 1201)  O2 Device (Oxygen Therapy): room air (07/07/18 0745) Vital Signs (24h Range):  Temp:  [97.6 °F (36.4 °C)-98.2 °F (36.8 °C)] 97.7 °F (36.5 °C)  Pulse:  [69-93] 69  Resp:  [16-33] 18  SpO2:  [93 %-99 %] 95 %  BP: (115-202)/() 126/82     Weight: 101.4 kg (223 lb 8.7 oz) (07/06/18 1357)  Body mass index is 31.18 kg/m².  Body surface area is 2.25 meters squared.    I/O last 3 completed shifts:  In: 1048.8 [I.V.:698.8; IV Piggyback:350]  Out: 1150 [Urine:1150]    Physical Exam   Constitutional: He is oriented to person, place, and time. He appears well-developed and well-nourished. No distress.   HENT:   Head: Normocephalic and atraumatic.   Eyes: Pupils are equal, round, and reactive to light.   Neck: Normal range of motion. Neck supple. No tracheal deviation present. No thyromegaly present.   Cardiovascular: Normal rate, regular rhythm, normal heart sounds and intact distal pulses.  Exam reveals no gallop and no friction rub.    No murmur heard.  Pulmonary/Chest: Effort normal and breath sounds normal. He has no wheezes. He has no rales.   Abdominal: Soft. He exhibits no mass. There is no tenderness. There is no rebound and no guarding.   No allograft tenderness    Musculoskeletal: Normal range of motion. He exhibits no edema.   S/p right BKA , left great toe with partial gangrene   Lymphadenopathy:     He has no cervical adenopathy.   Neurological: He is alert and oriented to person, place, and time.   Skin: Skin is warm. No rash noted. He is not diaphoretic. No erythema.   Nursing note and vitals  reviewed.      Significant Labs:  CBC:   Recent Labs  Lab 07/07/18  0432   WBC 5.43   RBC 4.82   HGB 15.3   HCT 45.2      MCV 94   MCH 31.7*   MCHC 33.8     CMP:   Recent Labs  Lab 07/07/18  0432   *   CALCIUM 9.4   ALBUMIN 3.3*   PROT 6.7      K 4.1   CO2 22*      BUN 22   CREATININE 1.7*   ALKPHOS 77   ALT 13   AST 10   BILITOT 0.6     Coagulation:   Recent Labs  Lab 07/06/18  1521   INR 0.9   APTT 23.9     LFTs:   Recent Labs  Lab 07/07/18  0432   ALT 13   AST 10   ALKPHOS 77   BILITOT 0.6   PROT 6.7   ALBUMIN 3.3*     All labs within the past 24 hours have been reviewed.    Lab Results   Component Value Date    .0 (H) 10/30/2017    CALCIUM 9.4 07/07/2018    CAION 1.10 05/30/2016    PHOS 2.3 (L) 06/15/2018    PHOS 2.3 (L) 06/15/2018           Significant Imaging: reviewed

## 2018-07-07 NOTE — ASSESSMENT & PLAN NOTE
- Admitted to Medicine  - Podiatry consulted from the ER; per Dr. Magaña will likely need amputation  - Vascular consult pending  - Continue empiric ABX with Vanco and Zosyn  - Local wound care per podiatry recs  - Neurovascular checks  - BC show NGTD  - Daily labs  - Monitor

## 2018-07-07 NOTE — PROGRESS NOTES
On savita pager called for vascular. States that Dr. Uribe is on call today. The answering service states that when they page Dr. Uribe, he states that he is not on call and will not speak with us, then the answering service hangs up. We have called back the on call pager trying to speak with the answering service to ask questions and keep getting hung up on. Robert BRAR, OC notified. He is following the chain of command.

## 2018-07-07 NOTE — PLAN OF CARE
Met with patient and family. Patient denies any post hospital needs or services at this time. Discussed that his discharge plans are dependent upon hospital procedures and progress. Discussed skilled placement and home health.   Weekday  to follow for discharge needs. Patient indicated that he would prefer to be able to discharge home with services. Transitional Care Folder, Discharge Planning Begins on Admission pamphlet, [facility] Delivery pamphlet, Advance Directive information given to patient along with the contact information given.Instructed patient or family to call with any questions or concerns.        [id number] - [place], [place] - [address]  [address]  [place] [address]  Phone: [phone] Fax: [phone]    [facility] [place] [address] [facility] [place] [address]  Phone: [phone] Fax: [phone]    [facility] [id number] - [place], [place] - [address]  [address]  [place] [address]  Phone: [phone] Fax: [phone]    [facility]  [address] [facility]  [place] [address]  Phone: [phone] Fax: [phone]    Rishabh Esteban MD  Payor: [employer] MANAGED MEDICARE / Plan: BLOVES TOTAL CARE ADVANTAGE / Product Type: Medicare Advantage /           07/07/18 1453   Discharge Assessment   Assessment Type Discharge Planning Assessment   Confirmed/corrected address and phone number on facesheet? Yes   Assessment information obtained from? Patient;Medical Record   Expected Length of Stay (days) (tbd)   Communicated expected length of stay with patient/caregiver yes   Prior to hospitilization cognitive status: Alert/Oriented   Prior to hospitalization functional status: Assistive Equipment   Current cognitive status: Alert/Oriented   Current Functional Status: Needs Assistance   Facility Arrived From: (home)   Lives With alone   Able to Return to Prior Arrangements unable to determine at this time  (comments)   Is patient able to care for self after discharge? Unable to determine at this time (comments)   Who are your caregiver(s) and their phone number(s)? Kecia Sagastume ( sister ) [phone]   Patient's perception of discharge disposition home health;skilled nursing facility   Readmission Within The Last 30 Days previous discharge plan unsuccessful   If yes, [facility]   Patient currently being followed by outpatient case management? No   Patient currently receives home health services? No   Patient currently receives any other outside agency services? No   Equipment Currently Used at Home cane, straight;walker, standard;wheelchair;other (see comments)  (prosthesis)   Do you have any problems affording any of your prescribed medications? No   Is the patient taking medications as prescribed? yes   Does the patient have transportation home? Yes   Transportation Available car;family or friend will provide   Does the patient receive services at the [facility]? No   Discharge Plan A Home;Home with family   Discharge Plan B Skilled Nursing Facility   Patient/Family In Agreement With Plan yes

## 2018-07-07 NOTE — PROGRESS NOTES
Have tried to contact through secure chat to Vascular, Dr. Mcdonald as well as the vascular on call MD. I have no response from either MD. Will discuss plans with Dr. Magaña, podiatry and if ok will allow patient to eat.

## 2018-07-07 NOTE — PLAN OF CARE
Problem: Patient Care Overview  Goal: Plan of Care Review  Outcome: Ongoing (interventions implemented as appropriate)  Patient remains free of falls and safety precautions maintained. Afebrile. Pain controlled. Patient seen by podiatry, not yet from vascular. Accu checks as ordered. Doppler used for left pedal pulse. Wound care done as ordered. Will continue to monitor. 12 hour chart check.

## 2018-07-07 NOTE — ASSESSMENT & PLAN NOTE
-S/P angiogram performed by Dr. Mcdonald in early June to left leg (tibial and posterior tibial artery atherectomy with a balloon angioplasty and a left SFA atherectomy with balloon angioplasty).   - Vascular consult pending   - Continue ASA

## 2018-07-07 NOTE — PLAN OF CARE
07/07/18 1450   Readmission Questionnaire   At the time of your discharge, did someone talk to you about what your health problems were? Yes   At the time of discharge, did someone talk to you about what to watch out for regarding worsening of your health problem? Yes   At the time of discharge, did someone talk to you about what to do if you experienced worsening of your health problem? Yes   What do you believe caused you to be sick enough to be re-admitted? foot draining after completion of abx.   How often do you need to have someone help you when you read instructions, pamphlets, or other written material from your doctor or pharmacy? Never   Do you have problems taking your medications as prescribed? No   Do you have any problems affording any of  your prescribed medications? No   Do you have problems obtaining/receiving your medications? No   Does the patient have transportation to healthcare appointments? Yes   Lives With alone   Living Arrangements house   Does the patient have family/friends to help with healtcare needs after discharge? yes   Who are your caregiver(s) and their phone number(s)? Kecia Tanika ( sister ) [phone]   If no, what kind of help do you need at home? (TBD)   Are you currently feeling confused? No   Are you currently having problems thinking? No   Are you currently having memory problems? No

## 2018-07-07 NOTE — SUBJECTIVE & OBJECTIVE
Past Medical History:   Diagnosis Date    DINORAH (acute kidney injury) 2016    Arthritis     Chronic obstructive pulmonary disease 2016    Coronary artery disease involving native coronary artery of native heart without angina pectoris 2016     donor kidney transplant for DM 16     Induction with Thymo x3 and IV solumedrol to total 875mg  Kidney Biopsy  2016: 16 glomeruli, ACR type 1 AVR type 2, significant microcirculatory changes, c4d negative, No DSA, 5 to10% fibrosis. Treated with thymo x8 2016- no rejection      Diastolic heart failure     Encounter for blood transfusion     ESRD on RRT since 10/2013 10/29/2013    Biopsy proven diabetic nephropathy and lymphoplasmacytic interstitial infiltrate not c/w with AIN (ddx sjogrens or assoc with tamm-horsefall protein extravasation)     GERD (gastroesophageal reflux disease)     History of hepatitis C, s/p successful Rx w/ SVR12 - 2017    Completed 12 weeks harvoni w/ SVR    Hyperlipidemia     Hypertension     Prophylactic immunotherapy     Proteinuria     PVD (peripheral vascular disease) 2017    RLE BKA CT 16 Extensive atherosclerotic disease of the aorta and mesenteric arteries.     Renal hypertension     Type 2 diabetes mellitus with diabetic neuropathy, with long-term current use of insulin 2016    Vitamin B12 deficiency        Past Surgical History:   Procedure Laterality Date    AORTOGRAPHY WITH SERIALOGRAPHY N/A 2018    Procedure: LEFT LEG ANGIOGRAM;  Surgeon: Donal Mcdonald MD;  Location: [facility] CATH LAB;  Service: Vascular;  Laterality: N/A;    av bovine graft      Left UE    AV FISTULA PLACEMENT      left UE    CARDIAC CATHETERIZATION  2015    KIDNEY TRANSPLANT  2016    LEG AMPUTATION THROUGH KNEE      right LE, started as nail puncture leading to diabetic ulcer       Review of patient's allergies indicates:  No Known Allergies    No current  "facility-administered medications on file prior to encounter.      Current Outpatient Prescriptions on File Prior to Encounter   Medication Sig    amLODIPine (NORVASC) 2.5 MG tablet Take 1 tablet (2.5 mg total) by mouth once daily.    aspirin (ECOTRIN) 81 MG EC tablet Take 1 tablet (81 mg total) by mouth once daily.    BD INSULIN PEN NEEDLE UF SHORT 31 gauge x 5/16" Ndle     BD INSULIN SYRINGE ULTRA-FINE 1 mL 31 gauge x 5/16 Syrg USE ONE AS DIRECTED FOUR TIMES DAILY WITH MEALS AND AT BEDTIME    blood sugar diagnostic Strp 1 each by Misc.(Non-Drug; Combo Route) route 3 (three) times daily.    blood-glucose meter kit Use as instructed    ergocalciferol (ERGOCALCIFEROL) 50,000 unit Cap Take 1 capsule (50,000 Units total) by mouth every 7 days. Take on Mondays    insulin NPH (NOVOLIN N) 100 unit/mL injection Take 25 units subq with breakfast and 15 units at bedtime    lancets Misc 1 each by Misc.(Non-Drug; Combo Route) route 3 (three) times daily.    NOVOLIN R REGULAR U-100 INSULN 100 unit/mL injection INJECT 6 UNITS WITH BREAKFAST AND 8 UNITS WITH DINNER, SUBCUTANEOUSLY 30 MIN BEFORE MEAL.    pen needle, diabetic (EASY COMFORT PEN NEEDLES) 32 gauge x 5/32" Ndle Inject 1 each into the skin 3 (three) times daily.    pen needle, diabetic 31 gauge x 1/4" Ndle 1 each by Misc.(Non-Drug; Combo Route) route 4 (four) times daily.    predniSONE (DELTASONE) 5 MG tablet Take 1 tablet (5 mg total) by mouth once daily.    sodium bicarbonate 650 MG tablet Take 4 tablets (2,600 mg total) by mouth 2 (two) times daily.    tacrolimus (PROGRAF) 0.5 MG Cap Take 3 capsules (1.5 mg total) by mouth every 12 (twelve) hours. Z94.0 Kidney Transplant    zolpidem (AMBIEN) 5 MG Tab Take 2 tablets (10 mg total) by mouth nightly as needed.    [DISCONTINUED] gabapentin (NEURONTIN) 300 MG capsule Take one tab bid and 2 at night. (Patient taking differently: 3 (three) times daily. Take one tab bid and 2 at night.)    " budesonide-formoterol 160-4.5 mcg (SYMBICORT) 160-4.5 mcg/actuation HFAA Inhale 2 puffs into the lungs every 12 (twelve) hours. Wash out mouth after using    inhalation device (BREATHERITE VALVED MDI CHAMBER) Use as directed for inhalation.    ondansetron (ZOFRAN-ODT) 4 MG TbDL Take 1 tablet (4 mg total) by mouth every 8 (eight) hours as needed.    [DISCONTINUED] HYDROcodone-acetaminophen (NORCO) 5-325 mg per tablet Take 1 tablet by mouth every 6 (six) hours as needed for Pain.     Family History     Problem Relation (Age of Onset)    Cancer Father    Diabetes Father    Heart failure Father, Mother    Stroke Father        Social History Main Topics    Smoking status: Former Smoker     Packs/day: 1.00     Years: 40.00     Quit date: 1/11/2013    Smokeless tobacco: Never Used    Alcohol use 3.6 oz/week     6 Cans of beer per week      Comment: 6 beers/week    Drug use: No    Sexual activity: No     Review of Systems   Constitutional: Negative for activity change, appetite change, chills, fatigue and fever.   HENT: Negative.    Eyes: Negative for pain, redness and visual disturbance.   Respiratory: Positive for cough. Negative for shortness of breath and wheezing.    Cardiovascular: Positive for leg swelling. Negative for chest pain and palpitations.   Gastrointestinal: Negative for abdominal pain, constipation, diarrhea, nausea and vomiting.   Genitourinary: Negative.    Musculoskeletal: Positive for back pain and gait problem.   Skin: Positive for color change and wound.   Neurological: Positive for numbness. Negative for dizziness and light-headedness.   Psychiatric/Behavioral: Negative for agitation and confusion. The patient is not nervous/anxious.      Objective:     Vital Signs (Most Recent):  Temp: 97.7 °F (36.5 °C) (07/06/18 1357)  Pulse: 90 (07/06/18 1357)  Resp: 18 (07/06/18 1357)  BP: (!) 185/98 (07/06/18 1357)  SpO2: (!) 94 % (07/06/18 1357) Vital Signs (24h Range):  Temp:  [97.7 °F (36.5 °C)] 97.7  °F (36.5 °C)  Pulse:  [90] 90  Resp:  [18] 18  SpO2:  [94 %] 94 %  BP: (185)/(98) 185/98     Weight: 101.4 kg (223 lb 8.7 oz)  Body mass index is 31.18 kg/m².    Physical Exam   Constitutional: He is oriented to person, place, and time. He appears well-developed and well-nourished. No distress.   HENT:   Head: Normocephalic and atraumatic.   Eyes: Conjunctivae are normal. Right eye exhibits no discharge.   Neck: Normal range of motion. Neck supple. No tracheal deviation present. No thyromegaly present.   Cardiovascular: Normal rate, regular rhythm and normal heart sounds.  Exam reveals no gallop and no friction rub.    No murmur heard.  Pulmonary/Chest: Effort normal and breath sounds normal. He has no wheezes. He has no rales.   Abdominal: Soft. Bowel sounds are normal. He exhibits no mass. There is no tenderness. There is no rebound and no guarding.   obese   Musculoskeletal: He exhibits edema.   Left toe gangrene , s/p right BKA, mild edema to left foot   Lymphadenopathy:     He has no cervical adenopathy.   Neurological: He is alert and oriented to person, place, and time.   Skin: Skin is warm. No rash noted. He is not diaphoretic. No erythema.   Eschar to distal left great toe, nail is removed from nailbed, mild erythema present to the toe   Psychiatric: He has a normal mood and affect. His behavior is normal.   Nursing note and vitals reviewed.          Significant Labs:   CBC:   Recent Labs  Lab 07/06/18  1521   WBC 5.62   HGB 15.6   HCT 45.6        CMP:   Recent Labs  Lab 07/06/18  1521   *   K 5.0      CO2 21*   *   BUN 21   CREATININE 1.9*   CALCIUM 9.6   PROT 7.3   ALBUMIN 3.6   BILITOT 0.7   ALKPHOS 99   AST 14   ALT 15   ANIONGAP 12   EGFRNONAA 36*     All pertinent labs within the past 24 hours have been reviewed.    Significant Imaging: I have reviewed all pertinent imaging results/findings within the past 24 hours.

## 2018-07-07 NOTE — PROGRESS NOTES
Vancomycin Consult Note    Pharmacy consulted to dose vancomycin by Dr. Grissom in ER     Pt is a 63 y/o male with PMH of T2DM, kidney transplant on immunosuppressive therapy, right AKA, HTN, CKD, hx of ESBL infection, Hep C    Indication: L foot gangrene   WBC = 5.62  Tmax = 97.7  Blood cultures pending     IBW = 75.3 kg  ABW = 101.4 kg  Adj wt = 85.7 kg --> use for dosing  SCr = 1.9 --> we will pulse-dose vancomycin PRN     Pt received a 1 gm dose @ 1954  Random level due tomorrow 7/7 with AM labs  Re-dose when level < 18 mcg/ml     Thank you for allowing us to participate in this patient's care.   Katherine McArdle, Pharm.D. 7/6/2018 8:57 PM

## 2018-07-07 NOTE — ASSESSMENT & PLAN NOTE
1. S/p DDRT in 2016 : stable allograft fn , Cr at baseline at 1.7 mg/dl,     cont Prograf 1.5 mg bid and Pred 5 mg daily , follow Tac level ,      2. HTN : resume home BP meds, follow      3. Left toe gangrene, awaiting Podiatry eval , started on broad spectrum abx      4. Metabolic acidosis : resume Bicarb supplements      5. Anemia of CKD : stable Hb     6. Replete Phos

## 2018-07-07 NOTE — CONSULTS
[facility]  Nephrology  Consult Note        Patient Name: Lavelle Ladd  MRN: [id number]  Admission Date: 2018  Hospital Length of Stay: 1 days  Attending Provider: Gabi Mathias MD   Primary Care Physician: Rishabh Esteban MD  Principal Problem:Gangrene of toe of left foot    Consults  Subjective:     HPI: Lavelle Ladd is a pleasant 62 y old  man  who received a  - ( brain death ) kidney transplant on 16.  He has CKD stage 3 - GFR 30-59 and his baseline creatinine is between 1.6-1.8. He takes  prednisone and tacrolimus for maintenance immunosuppression.  MMF was stopped few months ago , s/p right BKA in  , has dry gangrene on his left great toe for few days, now developed purulent discharge, admitted for management,  Podiatry consulted , renal fn at his baseline , several admissions in the past few months for same reason ,     Past Medical History:   Diagnosis Date    DINORAH (acute kidney injury) 2016    Arthritis     Chronic obstructive pulmonary disease 2016    Coronary artery disease involving native coronary artery of native heart without angina pectoris 2016     donor kidney transplant for DM 16     Induction with Thymo x3 and IV solumedrol to total 875mg  Kidney Biopsy  2016: 16 glomeruli, ACR type 1 AVR type 2, significant microcirculatory changes, c4d negative, No DSA, 5 to10% fibrosis. Treated with thymo x8 2016- no rejection      Diastolic heart failure     Encounter for blood transfusion     ESRD on RRT since 10/2013 10/29/2013    Biopsy proven diabetic nephropathy and lymphoplasmacytic interstitial infiltrate not c/w with AIN (ddx sjogrens or assoc with tamm-horsefall protein extravasation)     GERD (gastroesophageal reflux disease)     History of hepatitis C, s/p successful Rx w/ SVR12 - 2017    Completed 12 weeks harvoni w/ SVR    Hyperlipidemia     Hypertension     Prophylactic  immunotherapy     Proteinuria     PVD (peripheral vascular disease) 6/26/2017    RLE BKA CT 12/11/16 Extensive atherosclerotic disease of the aorta and mesenteric arteries.     Renal hypertension     Type 2 diabetes mellitus with diabetic neuropathy, with long-term current use of insulin 12/1/2016    Vitamin B12 deficiency        Past Surgical History:   Procedure Laterality Date    AORTOGRAPHY WITH SERIALOGRAPHY N/A 6/14/2018    Procedure: LEFT LEG ANGIOGRAM;  Surgeon: Donal Mcdonald MD;  Location: [facility] CATH LAB;  Service: Vascular;  Laterality: N/A;    av bovine graft      Left UE    AV FISTULA PLACEMENT      left UE    CARDIAC CATHETERIZATION  02/2015    KIDNEY TRANSPLANT  05/21/2016    LEG AMPUTATION THROUGH KNEE  2011    right LE, started as nail puncture leading to diabetic ulcer       Review of patient's allergies indicates:  No Known Allergies  Current Facility-Administered Medications   Medication Frequency    0.9%  NaCl infusion Continuous    amLODIPine tablet 2.5 mg Daily    arformoterol nebulizer solution 15 mcg BID    aspirin EC tablet 81 mg Daily    budesonide nebulizer solution 0.5 mg BID    dextrose 50% injection 12.5 g PRN    dextrose 50% injection 25 g PRN    glucagon (human recombinant) injection 1 mg PRN    glucose chewable tablet 16 g PRN    glucose chewable tablet 24 g PRN    insulin aspart U-100 pen 1-10 Units QID (AC + HS) PRN    insulin detemir U-100 pen 10 Units QHS    morphine injection 2 mg Q4H PRN    morphine injection 4 mg Q4H PRN    ondansetron injection 4 mg Q8H PRN    piperacillin-tazobactam 4.5 g in dextrose 5 % 100 mL IVPB (ready to mix system) Q8H    predniSONE tablet 5 mg Daily    silver sulfADIAZINE 1% cream Daily    sodium bicarbonate tablet 2,600 mg BID    tacrolimus capsule 1.5 mg BID    [START ON 7/14/2018] vancomycin in dextrose 5 % 1 gram/250 mL IVPB 1,000 mg Q48H     Family History     Problem Relation (Age of Onset)    Cancer Father     Diabetes Father    Heart failure Father, Mother    Stroke Father        Social History Main Topics    Smoking status: Former Smoker     Packs/day: 1.00     Years: 40.00     Quit date: 1/11/2013    Smokeless tobacco: Never Used    Alcohol use 3.6 oz/week     6 Cans of beer per week      Comment: 6 beers/week    Drug use: No    Sexual activity: No     Review of Systems   Constitutional: Positive for fatigue. Negative for activity change and appetite change.   HENT: Negative for congestion and facial swelling.    Eyes: Negative for pain, discharge and redness.   Respiratory: Negative for apnea, cough and chest tightness.    Cardiovascular: Negative for chest pain, palpitations and leg swelling.   Gastrointestinal: Negative for abdominal distention.   Genitourinary: Negative for difficulty urinating, dysuria and frequency.   Musculoskeletal: Negative for neck pain and neck stiffness.   Skin: Negative for color change, rash and wound.   Neurological: Negative for dizziness, weakness and numbness.   Psychiatric/Behavioral: Negative for sleep disturbance.   All other systems reviewed and are negative.    Objective:     Vital Signs (Most Recent):  Temp: 97.7 °F (36.5 °C) (07/07/18 1201)  Pulse: 69 (07/07/18 1201)  Resp: 18 (07/07/18 1201)  BP: 126/82 (07/07/18 1201)  SpO2: 95 % (07/07/18 1201)  O2 Device (Oxygen Therapy): room air (07/07/18 0745) Vital Signs (24h Range):  Temp:  [97.6 °F (36.4 °C)-98.2 °F (36.8 °C)] 97.7 °F (36.5 °C)  Pulse:  [69-93] 69  Resp:  [16-33] 18  SpO2:  [93 %-99 %] 95 %  BP: (115-202)/() 126/82     Weight: 101.4 kg (223 lb 8.7 oz) (07/06/18 1357)  Body mass index is 31.18 kg/m².  Body surface area is 2.25 meters squared.    I/O last 3 completed shifts:  In: 1048.8 [I.V.:698.8; IV Piggyback:350]  Out: 1150 [Urine:1150]    Physical Exam   Constitutional: He is oriented to person, place, and time. He appears well-developed and well-nourished. No distress.   HENT:   Head: Normocephalic and  atraumatic.   Eyes: Pupils are equal, round, and reactive to light.   Neck: Normal range of motion. Neck supple. No tracheal deviation present. No thyromegaly present.   Cardiovascular: Normal rate, regular rhythm, normal heart sounds and intact distal pulses.  Exam reveals no gallop and no friction rub.    No murmur heard.  Pulmonary/Chest: Effort normal and breath sounds normal. He has no wheezes. He has no rales.   Abdominal: Soft. He exhibits no mass. There is no tenderness. There is no rebound and no guarding.   No allograft tenderness    Musculoskeletal: Normal range of motion. He exhibits no edema.   S/p right BKA , left great toe with partial gangrene   Lymphadenopathy:     He has no cervical adenopathy.   Neurological: He is alert and oriented to person, place, and time.   Skin: Skin is warm. No rash noted. He is not diaphoretic. No erythema.   Nursing note and vitals reviewed.      Significant Labs:  CBC:   Recent Labs  Lab 07/07/18  0432   WBC 5.43   RBC 4.82   HGB 15.3   HCT 45.2      MCV 94   MCH 31.7*   MCHC 33.8     CMP:   Recent Labs  Lab 07/07/18  0432   *   CALCIUM 9.4   ALBUMIN 3.3*   PROT 6.7      K 4.1   CO2 22*      BUN 22   CREATININE 1.7*   ALKPHOS 77   ALT 13   AST 10   BILITOT 0.6     Coagulation:   Recent Labs  Lab 07/06/18  1521   INR 0.9   APTT 23.9     LFTs:   Recent Labs  Lab 07/07/18  0432   ALT 13   AST 10   ALKPHOS 77   BILITOT 0.6   PROT 6.7   ALBUMIN 3.3*     All labs within the past 24 hours have been reviewed.    Lab Results   Component Value Date    .0 (H) 10/30/2017    CALCIUM 9.4 07/07/2018    CAION 1.10 05/30/2016    PHOS 2.3 (L) 06/15/2018    PHOS 2.3 (L) 06/15/2018           Significant Imaging: reviewed     Assessment/Plan:     Kidney transplant recipient     1. S/p DDRT in 2016 : stable allograft fn , Cr at baseline at 1.7 mg/dl,     cont Prograf 1.5 mg bid and Pred 5 mg daily , follow Tac level ,      2. HTN : resume home BP meds, follow       3. Left toe gangrene, awaiting Podiatry eval , started on broad spectrum abx      4. Metabolic acidosis : resume Bicarb supplements      5. Anemia of CKD : stable Hb     6. Replete Phos                     Thank you for your consult. I will follow-up with patient. Please contact us if you have any additional questions.     Total time spent 70 minutes including time needed to review the records,  patient  evaluation, documentation, face-to-face discussion with the patient,  primary team   more than 50% of the time was spent on coordination of care and counseling.       Carlos Staley MD   Nephrology  [facility]

## 2018-07-07 NOTE — H&P
[facility] Hospital Medicine  History & Physical    Patient Name: Lavelle Ladd  MRN: [id number]  Admission Date: 7/6/2018  Attending Physician: Ilan Grissom Jr., MD   Primary Care Provider: Rishabh Esteban MD         Patient information was obtained from patient, past medical records and ER records.     Subjective:     Principal Problem:Gangrene of toe of left foot    Chief Complaint:   Chief Complaint   Patient presents with    Foot Pain     left foot wound and infection        HPI: Lavelle Ladd is a 62 year old male with type II diabetes mellitus, diabetic neuropathy, chronic back pain, right above the knee amputation and history of kidney transplant on 5/21/16, on Prograf and prednisone, who presents to the ED for an darkened wound on his left distal hallux. He describes increased pain and swelling to the left foot. He states the area of eschar has increased, the nail has fallen off and there is clear fluid drainage from the wound. He denies fever, chills.          Arterial US indicates normal velocities within the common femoral, superficial femoral, popliteal and deep profunda - no focal stenosis or occlusion.          Plain film shows a non healed fracture of the base of the proximal phalanx of the 2nd toe, no air in the soft tissues. CBC is unremarkable, CMP shows hyponatremia, CKD stage 3 (baseline creatinine 1.6 - 1.8), glucose 356, CRP 9.6, lactate 2.2. Pt was admitted in early June for toe infection, treated with ABX and underwent angiogram performed by Dr. Mcdonald.  (tibial and posterior tibial artery atherectomy with a balloon angioplasty and a left SFA atherectomy with balloon angioplasty). He was discharged on Keflex. Mr. Ladd returned to the ED on 6/29 for toe infection and prescribed Clindamycin.           Past Medical History:   Diagnosis Date    DINORAH (acute kidney injury) 9/25/2016    Arthritis     Chronic obstructive pulmonary disease 9/12/2016    Coronary artery disease  involving native coronary artery of native heart without angina pectoris 2016     donor kidney transplant for DM 16     Induction with Thymo x3 and IV solumedrol to total 875mg  Kidney Biopsy  2016: 16 glomeruli, ACR type 1 AVR type 2, significant microcirculatory changes, c4d negative, No DSA, 5 to10% fibrosis. Treated with thymo x8 2016- no rejection      Diastolic heart failure     Encounter for blood transfusion     ESRD on RRT since 10/2013 10/29/2013    Biopsy proven diabetic nephropathy and lymphoplasmacytic interstitial infiltrate not c/w with AIN (ddx sjogrens or assoc with tamm-horsefall protein extravasation)     GERD (gastroesophageal reflux disease)     History of hepatitis C, s/p successful Rx w/ SVR12 - 2017    Completed 12 weeks harvoni w/ SVR    Hyperlipidemia     Hypertension     Prophylactic immunotherapy     Proteinuria     PVD (peripheral vascular disease) 2017    RLE BKA CT 16 Extensive atherosclerotic disease of the aorta and mesenteric arteries.     Renal hypertension     Type 2 diabetes mellitus with diabetic neuropathy, with long-term current use of insulin 2016    Vitamin B12 deficiency        Past Surgical History:   Procedure Laterality Date    AORTOGRAPHY WITH SERIALOGRAPHY N/A 2018    Procedure: LEFT LEG ANGIOGRAM;  Surgeon: Donal Mcdonald MD;  Location: [facility] CATH LAB;  Service: Vascular;  Laterality: N/A;    av bovine graft      Left UE    AV FISTULA PLACEMENT      left UE    CARDIAC CATHETERIZATION  2015    KIDNEY TRANSPLANT  2016    LEG AMPUTATION THROUGH KNEE      right LE, started as nail puncture leading to diabetic ulcer       Review of patient's allergies indicates:  No Known Allergies    No current facility-administered medications on file prior to encounter.      Current Outpatient Prescriptions on File Prior to Encounter   Medication Sig    amLODIPine (NORVASC) 2.5 MG tablet Take 1  "tablet (2.5 mg total) by mouth once daily.    aspirin (ECOTRIN) 81 MG EC tablet Take 1 tablet (81 mg total) by mouth once daily.    BD INSULIN PEN NEEDLE UF SHORT 31 gauge x 5/16" Ndle     BD INSULIN SYRINGE ULTRA-FINE 1 mL 31 gauge x 5/16 Syrg USE ONE AS DIRECTED FOUR TIMES DAILY WITH MEALS AND AT BEDTIME    blood sugar diagnostic Strp 1 each by Misc.(Non-Drug; Combo Route) route 3 (three) times daily.    blood-glucose meter kit Use as instructed    ergocalciferol (ERGOCALCIFEROL) 50,000 unit Cap Take 1 capsule (50,000 Units total) by mouth every 7 days. Take on Mondays    insulin NPH (NOVOLIN N) 100 unit/mL injection Take 25 units subq with breakfast and 15 units at bedtime    lancets Misc 1 each by Misc.(Non-Drug; Combo Route) route 3 (three) times daily.    NOVOLIN R REGULAR U-100 INSULN 100 unit/mL injection INJECT 6 UNITS WITH BREAKFAST AND 8 UNITS WITH DINNER, SUBCUTANEOUSLY 30 MIN BEFORE MEAL.    pen needle, diabetic (EASY COMFORT PEN NEEDLES) 32 gauge x 5/32" Ndle Inject 1 each into the skin 3 (three) times daily.    pen needle, diabetic 31 gauge x 1/4" Ndle 1 each by Misc.(Non-Drug; Combo Route) route 4 (four) times daily.    predniSONE (DELTASONE) 5 MG tablet Take 1 tablet (5 mg total) by mouth once daily.    sodium bicarbonate 650 MG tablet Take 4 tablets (2,600 mg total) by mouth 2 (two) times daily.    tacrolimus (PROGRAF) 0.5 MG Cap Take 3 capsules (1.5 mg total) by mouth every 12 (twelve) hours. Z94.0 Kidney Transplant    zolpidem (AMBIEN) 5 MG Tab Take 2 tablets (10 mg total) by mouth nightly as needed.    [DISCONTINUED] gabapentin (NEURONTIN) 300 MG capsule Take one tab bid and 2 at night. (Patient taking differently: 3 (three) times daily. Take one tab bid and 2 at night.)    budesonide-formoterol 160-4.5 mcg (SYMBICORT) 160-4.5 mcg/actuation HFAA Inhale 2 puffs into the lungs every 12 (twelve) hours. Wash out mouth after using    inhalation device (BREATHERITE VALVED MDI " CHAMBER) Use as directed for inhalation.    ondansetron (ZOFRAN-ODT) 4 MG TbDL Take 1 tablet (4 mg total) by mouth every 8 (eight) hours as needed.    [DISCONTINUED] HYDROcodone-acetaminophen (NORCO) 5-325 mg per tablet Take 1 tablet by mouth every 6 (six) hours as needed for Pain.     Family History     Problem Relation (Age of Onset)    Cancer Father    Diabetes Father    Heart failure Father, Mother    Stroke Father        Social History Main Topics    Smoking status: Former Smoker     Packs/day: 1.00     Years: 40.00     Quit date: 1/11/2013    Smokeless tobacco: Never Used    Alcohol use 3.6 oz/week     6 Cans of beer per week      Comment: 6 beers/week    Drug use: No    Sexual activity: No     Review of Systems   Constitutional: Negative for activity change, appetite change, chills, fatigue and fever.   HENT: Negative.    Eyes: Negative for pain, redness and visual disturbance.   Respiratory: Positive for cough. Negative for shortness of breath and wheezing.    Cardiovascular: Positive for leg swelling. Negative for chest pain and palpitations.   Gastrointestinal: Negative for abdominal pain, constipation, diarrhea, nausea and vomiting.   Genitourinary: Negative.    Musculoskeletal: Positive for back pain and gait problem.   Skin: Positive for color change and wound.   Neurological: Positive for numbness. Negative for dizziness and light-headedness.   Psychiatric/Behavioral: Negative for agitation and confusion. The patient is not nervous/anxious.      Objective:     Vital Signs (Most Recent):  Temp: 97.7 °F (36.5 °C) (07/06/18 1357)  Pulse: 90 (07/06/18 1357)  Resp: 18 (07/06/18 1357)  BP: (!) 185/98 (07/06/18 1357)  SpO2: (!) 94 % (07/06/18 1357) Vital Signs (24h Range):  Temp:  [97.7 °F (36.5 °C)] 97.7 °F (36.5 °C)  Pulse:  [90] 90  Resp:  [18] 18  SpO2:  [94 %] 94 %  BP: (185)/(98) 185/98     Weight: 101.4 kg (223 lb 8.7 oz)  Body mass index is 31.18 kg/m².    Physical Exam   Constitutional: He is  oriented to person, place, and time. He appears well-developed and well-nourished. No distress.   HENT:   Head: Normocephalic and atraumatic.   Eyes: Conjunctivae are normal. Right eye exhibits no discharge.   Neck: Normal range of motion. Neck supple. No tracheal deviation present. No thyromegaly present.   Cardiovascular: Normal rate, regular rhythm and normal heart sounds.  Exam reveals no gallop and no friction rub.    No murmur heard.  Pulmonary/Chest: Effort normal and breath sounds normal. He has no wheezes. He has no rales.   Abdominal: Soft. Bowel sounds are normal. He exhibits no mass. There is no tenderness. There is no rebound and no guarding.   obese   Musculoskeletal: He exhibits edema.   Left toe gangrene , s/p right BKA, mild edema to left foot   Lymphadenopathy:     He has no cervical adenopathy.   Neurological: He is alert and oriented to person, place, and time.   Skin: Skin is warm. No rash noted. He is not diaphoretic. No erythema.   Eschar to distal left great toe, nail is removed from nailbed, mild erythema present to the toe   Psychiatric: He has a normal mood and affect. His behavior is normal.   Nursing note and vitals reviewed.          Significant Labs:   CBC:   Recent Labs  Lab 07/06/18  1521   WBC 5.62   HGB 15.6   HCT 45.6        CMP:   Recent Labs  Lab 07/06/18  1521   *   K 5.0      CO2 21*   *   BUN 21   CREATININE 1.9*   CALCIUM 9.6   PROT 7.3   ALBUMIN 3.6   BILITOT 0.7   ALKPHOS 99   AST 14   ALT 15   ANIONGAP 12   EGFRNONAA 36*     All pertinent labs within the past 24 hours have been reviewed.    Significant Imaging: I have reviewed all pertinent imaging results/findings within the past 24 hours.    Assessment/Plan:     * Gangrene of toe of left foot    Podiatry consult - spoke with Dr. Magaña in the ED  Likely will need amputation per Dr. Magaña  Empiric ABX with Vanco and Zosyn  Vascular consult to evaluate for possible intervention vs  amputation  Neurovascular checks  NPO after MN for possible procedure in AM          Long term current use of immunosuppressive drug    Continue prednisone and Prograf per Nephrology             Peripheral vascular disease    Early June/2018  underwent angiogram performed by Dr. Mcdonald to left leg (tibial and posterior tibial artery atherectomy with a balloon angioplasty and a left SFA atherectomy with balloon angioplasty).   Vascular consult - ? Intervention vs amputation        Kidney transplant recipient    Nephrology consult  Do we resume Prograf and prednisone        Chronic kidney disease (CKD), stage III (moderate)    Baseline 1.6 - 1.8  Nephrology consult as patient is renal transplant recipient taking Prograf and prednisone          Type 2 diabetes mellitus with hyperglycemia, with long-term current use of insulin    Continue long acting insulin  Sliding scale insulin  Diabetic diet            VTE Risk Mitigation     None             Indira Perry NP  Department of Hospital Medicine   [facility]

## 2018-07-07 NOTE — SUBJECTIVE & OBJECTIVE
Interval History: No acute events overnight.  Resting comfortably in bed.  Vascular consult pending.  Continue IV ABX.      Review of Systems   Constitutional: Negative for activity change, appetite change, chills, fatigue and fever.   HENT: Negative.  Negative for congestion.    Eyes: Negative for pain, redness and visual disturbance.   Respiratory: Negative for cough, shortness of breath and wheezing.    Cardiovascular: Positive for leg swelling. Negative for chest pain and palpitations.   Gastrointestinal: Negative for abdominal pain, constipation, diarrhea, nausea and vomiting.   Genitourinary: Negative.    Musculoskeletal: Positive for back pain and gait problem.   Skin: Positive for color change and wound.   Neurological: Positive for numbness. Negative for dizziness and light-headedness.        + numbness to bilateral feet   Psychiatric/Behavioral: Negative for agitation and confusion. The patient is not nervous/anxious.      Objective:     Vital Signs (Most Recent):  Temp: 97.7 °F (36.5 °C) (07/07/18 1201)  Pulse: 69 (07/07/18 1201)  Resp: 18 (07/07/18 1201)  BP: 126/82 (07/07/18 1201)  SpO2: 95 % (07/07/18 1201) Vital Signs (24h Range):  Temp:  [97.6 °F (36.4 °C)-98.2 °F (36.8 °C)] 97.7 °F (36.5 °C)  Pulse:  [69-93] 69  Resp:  [16-33] 18  SpO2:  [93 %-99 %] 95 %  BP: (115-202)/() 126/82     Weight: 101.4 kg (223 lb 8.7 oz)  Body mass index is 31.18 kg/m².    Intake/Output Summary (Last 24 hours) at 07/07/18 1429  Last data filed at 07/07/18 1300   Gross per 24 hour   Intake             1990 ml   Output             1150 ml   Net              840 ml      Physical Exam   Constitutional: He is oriented to person, place, and time. He appears well-developed and well-nourished. No distress.   HENT:   Head: Normocephalic and atraumatic.   Eyes: Conjunctivae and EOM are normal. Right eye exhibits no discharge.   Neck: Normal range of motion. Neck supple. No tracheal deviation present. No thyromegaly present.    Cardiovascular: Normal rate, regular rhythm and normal heart sounds.  Exam reveals no gallop and no friction rub.    No murmur heard.  Left PT and DP pulses audible per doppler   Pulmonary/Chest: Effort normal and breath sounds normal. He has no wheezes. He has no rales.   Abdominal: Soft. Bowel sounds are normal. He exhibits no mass. There is no tenderness. There is no rebound and no guarding.   Musculoskeletal: He exhibits edema.   Left toe gangrene , s/p right BKA, mild edema to left foot   Lymphadenopathy:     He has no cervical adenopathy.   Neurological: He is alert and oriented to person, place, and time.   Skin: Skin is warm. No rash noted. He is not diaphoretic. There is erythema.   Eschar to distal left great toe, nail is removed from nailbed, mild erythema present to the toe   Psychiatric: He has a normal mood and affect. His behavior is normal.   Nursing note and vitals reviewed.              Significant Labs:   A1C:   Recent Labs  Lab 06/12/18  0901 07/07/18  0432   HGBA1C 9.9* 10.2*     Blood Culture:   Recent Labs  Lab 07/06/18  1800 07/06/18  1837   LABBLOO No Growth to date No Growth to date     CBC:   Recent Labs  Lab 07/06/18  1521 07/07/18  0432   WBC 5.62 5.43   HGB 15.6 15.3   HCT 45.6 45.2    190     CMP:   Recent Labs  Lab 07/06/18  1521 07/07/18  0432   * 141   K 5.0 4.1    107   CO2 21* 22*   * 112*   BUN 21 22   CREATININE 1.9* 1.7*   CALCIUM 9.6 9.4   PROT 7.3 6.7   ALBUMIN 3.6 3.3*   BILITOT 0.7 0.6   ALKPHOS 99 77   AST 14 10   ALT 15 13   ANIONGAP 12 12   EGFRNONAA 36* 42*       Significant Imaging: I have reviewed all pertinent imaging results/findings within the past 24 hours.

## 2018-07-08 LAB
ALBUMIN SERPL BCP-MCNC: 3.2 G/DL
ANION GAP SERPL CALC-SCNC: 11 MMOL/L
BASOPHILS # BLD AUTO: 0.03 K/UL
BASOPHILS NFR BLD: 0.6 %
BUN SERPL-MCNC: 22 MG/DL
CALCIUM SERPL-MCNC: 9.3 MG/DL
CHLORIDE SERPL-SCNC: 104 MMOL/L
CO2 SERPL-SCNC: 22 MMOL/L
CREAT SERPL-MCNC: 1.8 MG/DL
DIFFERENTIAL METHOD: NORMAL
EOSINOPHIL # BLD AUTO: 0.1 K/UL
EOSINOPHIL NFR BLD: 2.4 %
ERYTHROCYTE [DISTWIDTH] IN BLOOD BY AUTOMATED COUNT: 13.2 %
EST. GFR  (AFRICAN AMERICAN): 45 ML/MIN/1.73 M^2
EST. GFR  (NON AFRICAN AMERICAN): 39 ML/MIN/1.73 M^2
GLUCOSE SERPL-MCNC: 139 MG/DL
HCT VFR BLD AUTO: 44.5 %
HGB BLD-MCNC: 14.7 G/DL
LYMPHOCYTES # BLD AUTO: 1.4 K/UL
LYMPHOCYTES NFR BLD: 27.9 %
MCH RBC QN AUTO: 30.8 PG
MCHC RBC AUTO-ENTMCNC: 33 G/DL
MCV RBC AUTO: 93 FL
MONOCYTES # BLD AUTO: 0.6 K/UL
MONOCYTES NFR BLD: 12.4 %
NEUTROPHILS # BLD AUTO: 2.9 K/UL
NEUTROPHILS NFR BLD: 56.7 %
PHOSPHATE SERPL-MCNC: 2.6 MG/DL
PLATELET # BLD AUTO: 187 K/UL
PMV BLD AUTO: 10 FL
POCT GLUCOSE: 155 MG/DL (ref 70–110)
POCT GLUCOSE: 210 MG/DL (ref 70–110)
POCT GLUCOSE: 275 MG/DL (ref 70–110)
POCT GLUCOSE: 330 MG/DL (ref 70–110)
POTASSIUM SERPL-SCNC: 4.3 MMOL/L
RBC # BLD AUTO: 4.77 M/UL
SODIUM SERPL-SCNC: 137 MMOL/L
TACROLIMUS BLD-MCNC: 5.6 NG/ML
VANCOMYCIN SERPL-MCNC: 13.2 UG/ML
WBC # BLD AUTO: 5.02 K/UL

## 2018-07-08 PROCEDURE — 25000003 PHARM REV CODE 250: Performed by: NURSE PRACTITIONER

## 2018-07-08 PROCEDURE — 63600175 PHARM REV CODE 636 W HCPCS: Performed by: NURSE PRACTITIONER

## 2018-07-08 PROCEDURE — 63600175 PHARM REV CODE 636 W HCPCS: Performed by: INTERNAL MEDICINE

## 2018-07-08 PROCEDURE — 25000003 PHARM REV CODE 250: Performed by: INTERNAL MEDICINE

## 2018-07-08 PROCEDURE — 96372 THER/PROPH/DIAG INJ SC/IM: CPT

## 2018-07-08 PROCEDURE — 63600175 PHARM REV CODE 636 W HCPCS: Performed by: EMERGENCY MEDICINE

## 2018-07-08 PROCEDURE — 25000242 PHARM REV CODE 250 ALT 637 W/ HCPCS: Performed by: EMERGENCY MEDICINE

## 2018-07-08 PROCEDURE — 80197 ASSAY OF TACROLIMUS: CPT

## 2018-07-08 PROCEDURE — 85025 COMPLETE CBC W/AUTO DIFF WBC: CPT

## 2018-07-08 PROCEDURE — 25000242 PHARM REV CODE 250 ALT 637 W/ HCPCS: Performed by: NURSE PRACTITIONER

## 2018-07-08 PROCEDURE — 94640 AIRWAY INHALATION TREATMENT: CPT

## 2018-07-08 PROCEDURE — 99233 SBSQ HOSP IP/OBS HIGH 50: CPT | Mod: ,,, | Performed by: INTERNAL MEDICINE

## 2018-07-08 PROCEDURE — 80202 ASSAY OF VANCOMYCIN: CPT

## 2018-07-08 PROCEDURE — 80069 RENAL FUNCTION PANEL: CPT

## 2018-07-08 PROCEDURE — 11000001 HC ACUTE MED/SURG PRIVATE ROOM

## 2018-07-08 PROCEDURE — 36415 COLL VENOUS BLD VENIPUNCTURE: CPT

## 2018-07-08 PROCEDURE — 94760 N-INVAS EAR/PLS OXIMETRY 1: CPT

## 2018-07-08 RX ORDER — HYDROCODONE BITARTRATE AND ACETAMINOPHEN 10; 325 MG/1; MG/1
1 TABLET ORAL EVERY 6 HOURS PRN
Status: DISCONTINUED | OUTPATIENT
Start: 2018-07-08 | End: 2018-07-10 | Stop reason: HOSPADM

## 2018-07-08 RX ORDER — HYDROCODONE BITARTRATE AND ACETAMINOPHEN 10; 325 MG/1; MG/1
1 TABLET ORAL EVERY 6 HOURS PRN
Status: DISCONTINUED | OUTPATIENT
Start: 2018-07-08 | End: 2018-07-08

## 2018-07-08 RX ORDER — HYDROCODONE BITARTRATE AND ACETAMINOPHEN 5; 325 MG/1; MG/1
1 TABLET ORAL EVERY 6 HOURS PRN
Status: DISCONTINUED | OUTPATIENT
Start: 2018-07-08 | End: 2018-07-10 | Stop reason: HOSPADM

## 2018-07-08 RX ORDER — MORPHINE SULFATE 4 MG/ML
2 INJECTION, SOLUTION INTRAMUSCULAR; INTRAVENOUS EVERY 4 HOURS PRN
Status: DISCONTINUED | OUTPATIENT
Start: 2018-07-08 | End: 2018-07-10 | Stop reason: HOSPADM

## 2018-07-08 RX ORDER — HYDRALAZINE HYDROCHLORIDE 20 MG/ML
10 INJECTION INTRAMUSCULAR; INTRAVENOUS EVERY 8 HOURS PRN
Status: DISCONTINUED | OUTPATIENT
Start: 2018-07-08 | End: 2018-07-09

## 2018-07-08 RX ORDER — DIPHENHYDRAMINE HYDROCHLORIDE 50 MG/ML
25 INJECTION INTRAMUSCULAR; INTRAVENOUS EVERY 6 HOURS PRN
Status: DISCONTINUED | OUTPATIENT
Start: 2018-07-08 | End: 2018-07-10 | Stop reason: HOSPADM

## 2018-07-08 RX ADMIN — PREDNISONE 5 MG: 5 TABLET ORAL at 09:07

## 2018-07-08 RX ADMIN — ARFORMOTEROL TARTRATE 15 MCG: 15 SOLUTION RESPIRATORY (INHALATION) at 07:07

## 2018-07-08 RX ADMIN — TACROLIMUS 1.5 MG: 1 CAPSULE ORAL at 08:07

## 2018-07-08 RX ADMIN — INSULIN DETEMIR 10 UNITS: 100 INJECTION, SOLUTION SUBCUTANEOUS at 08:07

## 2018-07-08 RX ADMIN — SODIUM CHLORIDE: 0.9 INJECTION, SOLUTION INTRAVENOUS at 04:07

## 2018-07-08 RX ADMIN — VANCOMYCIN HYDROCHLORIDE 1250 MG: 1 INJECTION, POWDER, LYOPHILIZED, FOR SOLUTION INTRAVENOUS at 08:07

## 2018-07-08 RX ADMIN — PIPERACILLIN SODIUM AND TAZOBACTAM SODIUM 4.5 G: 4; .5 INJECTION, POWDER, LYOPHILIZED, FOR SOLUTION INTRAVENOUS at 05:07

## 2018-07-08 RX ADMIN — HYDRALAZINE HYDROCHLORIDE 10 MG: 20 INJECTION INTRAMUSCULAR; INTRAVENOUS at 08:07

## 2018-07-08 RX ADMIN — MORPHINE SULFATE 4 MG: 4 INJECTION INTRAVENOUS at 07:07

## 2018-07-08 RX ADMIN — MORPHINE SULFATE 4 MG: 4 INJECTION INTRAVENOUS at 03:07

## 2018-07-08 RX ADMIN — INSULIN ASPART 3 UNITS: 100 INJECTION, SOLUTION INTRAVENOUS; SUBCUTANEOUS at 10:07

## 2018-07-08 RX ADMIN — HYDROCODONE BITARTRATE AND ACETAMINOPHEN 1 TABLET: 10; 325 TABLET ORAL at 10:07

## 2018-07-08 RX ADMIN — BUDESONIDE 0.5 MG: 0.5 SUSPENSION RESPIRATORY (INHALATION) at 07:07

## 2018-07-08 RX ADMIN — ASPIRIN 81 MG: 81 TABLET, COATED ORAL at 09:07

## 2018-07-08 RX ADMIN — SILVER SULFADIAZINE: 10 CREAM TOPICAL at 10:07

## 2018-07-08 RX ADMIN — PIPERACILLIN SODIUM AND TAZOBACTAM SODIUM 4.5 G: 4; .5 INJECTION, POWDER, LYOPHILIZED, FOR SOLUTION INTRAVENOUS at 10:07

## 2018-07-08 RX ADMIN — PIPERACILLIN SODIUM AND TAZOBACTAM SODIUM 4.5 G: 4; .5 INJECTION, POWDER, LYOPHILIZED, FOR SOLUTION INTRAVENOUS at 02:07

## 2018-07-08 RX ADMIN — HYDROCODONE BITARTRATE AND ACETAMINOPHEN 1 TABLET: 10; 325 TABLET ORAL at 04:07

## 2018-07-08 RX ADMIN — SODIUM BICARBONATE 2600 MG: 650 TABLET ORAL at 09:07

## 2018-07-08 RX ADMIN — TACROLIMUS 1.5 MG: 1 CAPSULE ORAL at 05:07

## 2018-07-08 RX ADMIN — AMLODIPINE BESYLATE 2.5 MG: 2.5 TABLET ORAL at 09:07

## 2018-07-08 RX ADMIN — INSULIN ASPART 4 UNITS: 100 INJECTION, SOLUTION INTRAVENOUS; SUBCUTANEOUS at 04:07

## 2018-07-08 RX ADMIN — MORPHINE SULFATE 4 MG: 4 INJECTION INTRAVENOUS at 11:07

## 2018-07-08 RX ADMIN — INSULIN ASPART 8 UNITS: 100 INJECTION, SOLUTION INTRAVENOUS; SUBCUTANEOUS at 11:07

## 2018-07-08 RX ADMIN — SODIUM BICARBONATE 2600 MG: 650 TABLET ORAL at 08:07

## 2018-07-08 RX ADMIN — DIPHENHYDRAMINE HYDROCHLORIDE 25 MG: 50 INJECTION, SOLUTION INTRAMUSCULAR; INTRAVENOUS at 10:07

## 2018-07-08 RX ADMIN — MORPHINE SULFATE 2 MG: 4 INJECTION INTRAVENOUS at 07:07

## 2018-07-08 NOTE — PROGRESS NOTES
The below note by Carole Muñoz RN is NOT correct.      I NEVER told the answering service that I was not on call.    I was only contacted one time by the answering service and corrected them and informed them that I was on call for my group.  This took place at 631 pm.  At that time I spoke to the nursing supervisor for the floor and ordered appropriate vascular studies.      Again, I NEVER told the answering service that I would not speak to [facility] or take a consult

## 2018-07-08 NOTE — PLAN OF CARE
Problem: Patient Care Overview  Goal: Plan of Care Review  Outcome: Ongoing (interventions implemented as appropriate)  Remained free from injury. Tolerating diet. Insulin given per sliding scale. Ambulating with assist. Prosthetic at bedside. Pain contolled with po and iv medication. Wound care performed this shift. Continuing iv abx. 12 hour chart check complete.

## 2018-07-08 NOTE — PROGRESS NOTES
[facility] Hospital Medicine  Progress Note    Patient Name: Lavelle Ladd  MRN: [id number]  Patient Class: IP- Inpatient   Admission Date: 7/6/2018  Length of Stay: 2 days  Attending Physician: Gabi Mathias MD  Primary Care Provider: Rishabh Esteban MD        Subjective:     Principal Problem:Gangrene of toe of left foot    HPI:  Lavelle Ladd is a 62 year old male with type II diabetes mellitus, diabetic neuropathy, chronic back pain, right above the knee amputation and history of kidney transplant on 5/21/16, on Prograf and prednisone, who presents to the ED for an darkened wound on his left distal hallux. He describes increased pain and swelling to the left foot. He states the area of eschar has increased, the nail has fallen off and there is clear fluid drainage from the wound. He denies fever, chills.          Arterial US indicates normal velocities within the common femoral, superficial femoral, popliteal and deep profunda - no focal stenosis or occlusion.          Plain film shows a non healed fracture of the base of the proximal phalanx of the 2nd toe, no air in the soft tissues. CBC is unremarkable, CMP shows hyponatremia, CKD stage 3 (baseline creatinine 1.6 - 1.8), glucose 356, CRP 9.6, lactate 2.2. Pt was admitted in early June for toe infection, treated with ABX and underwent angiogram performed by Dr. Mcdonald.  (tibial and posterior tibial artery atherectomy with a balloon angioplasty and a left SFA atherectomy with balloon angioplasty). He was discharged on Keflex. Mr. Ladd returned to the ED on 6/29 for toe infection and prescribed Clindamycin.           Hospital Course:  On 7/6 patient was admitted to Medicine secondary to Gangrene of Left great toe.  Xray of left foot showed no acute findings.  No change in the incompletely healed fracture of the base of the proximal phalanx of the 2nd toe.  Arterial US showed diffuse atherosclerotic change of the left lower extremity  arteries.  No findings suspicious for focal stenosis or occlusion.  He has a h/o PVD s/p Angiogram with balloon angioplasty and atherectomy to LLE per Dr. Mcdonald in June.  Podiatry consulted in the ER and per Dr. Magaña he will likely need amputation.  Vascular consult pending.  Started on Vanc and Zosyn.  Nephrology consulted given h/o kidney transplant.  BC continue to show NGTD.  As of 7/8 patient has been evaluated by vascular surgeon Dr. Uribe.  Per vascular, left foot arterial blood flow is adequate.  Ok for podiatry to proceed with OR for aggressive local debridement/amputation.  Reconsult if any further assistance is required.  Will d/w Dr. Magaña to determine POC moving forward.  Continue local care per podiatry recs for now.     Interval History:  No acute events overnight.  Resting comfortably in bed.  Evaluated by vascular surgeon Dr. Uribe.  Per vascular, left foot arterial blood flow is adequate.  Ok for podiatry to proceed with OR for aggressive local debridement/amputation.  Reconsult if any further assistance is required.  Will d/w Dr. Magaña to determine POC moving forward.  Continue local care per podiatry recs for now.       Review of Systems   Constitutional: Negative for activity change, appetite change, chills, fatigue and fever.   HENT: Negative.  Negative for congestion.    Eyes: Negative for pain, redness and visual disturbance.   Respiratory: Negative for cough, shortness of breath and wheezing.    Cardiovascular: Positive for leg swelling. Negative for chest pain and palpitations.   Gastrointestinal: Negative for abdominal pain, constipation, diarrhea, nausea and vomiting.   Genitourinary: Negative.    Musculoskeletal: Positive for back pain and gait problem.   Skin: Positive for color change and wound.   Neurological: Positive for numbness. Negative for dizziness and light-headedness.        + numbness to bilateral feet   Psychiatric/Behavioral: Negative for agitation and confusion. The  patient is not nervous/anxious.      Objective:     Vital Signs (Most Recent):  Temp: 97.8 °F (36.6 °C) (07/08/18 0800)  Pulse: 72 (07/08/18 0800)  Resp: 16 (07/08/18 0800)  BP: 114/80 (07/08/18 0800)  SpO2: (!) 94 % (07/08/18 0800) Vital Signs (24h Range):  Temp:  [96.7 °F (35.9 °C)-97.9 °F (36.6 °C)] 97.8 °F (36.6 °C)  Pulse:  [68-86] 72  Resp:  [16-20] 16  SpO2:  [94 %-97 %] 94 %  BP: (114-139)/(61-84) 114/80     Weight: 101.4 kg (223 lb 8.7 oz)  Body mass index is 31.18 kg/m².    Intake/Output Summary (Last 24 hours) at 07/08/18 1051  Last data filed at 07/08/18 0500   Gross per 24 hour   Intake          2336.66 ml   Output              825 ml   Net          1511.66 ml      Physical Exam   Constitutional: He is oriented to person, place, and time. He appears well-developed and well-nourished. No distress.   HENT:   Head: Normocephalic and atraumatic.   Eyes: Conjunctivae and EOM are normal. Right eye exhibits no discharge.   Neck: Normal range of motion. Neck supple. No tracheal deviation present. No thyromegaly present.   Cardiovascular: Normal rate, regular rhythm and normal heart sounds.  Exam reveals no gallop and no friction rub.    No murmur heard.  Left PT and DP pulses audible per doppler   Pulmonary/Chest: Effort normal and breath sounds normal. He has no wheezes. He has no rales.   Abdominal: Soft. Bowel sounds are normal. He exhibits no mass. There is no tenderness. There is no rebound and no guarding.   Musculoskeletal: He exhibits edema.   Left toe gangrene , s/p right BKA, mild edema to left foot   Lymphadenopathy:     He has no cervical adenopathy.   Neurological: He is alert and oriented to person, place, and time.   Skin: Skin is warm. No rash noted. He is not diaphoretic. There is erythema.   Eschar to distal left great toe, nail is removed from nailbed, mild erythema present to the toe   Psychiatric: He has a normal mood and affect. His behavior is normal.   Nursing note and vitals  reviewed.      Significant Labs:   Blood Culture:   Recent Labs  Lab 07/06/18  1800 07/06/18  1837   LABBLOO No Growth to date  No Growth to date No Growth to date  No Growth to date     BMP:   Recent Labs  Lab 07/08/18  0429   *      K 4.3      CO2 22*   BUN 22   CREATININE 1.8*   CALCIUM 9.3     CBC:   Recent Labs  Lab 07/06/18  1521 07/07/18  0432 07/08/18  0429   WBC 5.62 5.43 5.02   HGB 15.6 15.3 14.7   HCT 45.6 45.2 44.5    190 187       Significant Imaging: I have reviewed all pertinent imaging results/findings within the past 24 hours.    Assessment/Plan:      * Gangrene of toe of left foot    - Admitted to Medicine  - Podiatry consulted from the ER; per Dr. Magaña will likely need amputation  - Evaluated by vascular surgeon Dr. Uribe.  Per vascular, left foot arterial blood flow is adequate.  Ok for podiatry to proceed with OR for aggressive local debridement/amputation.  Reconsult if any further assistance is required.   - Will d/w Dr. Magaña to determine POC moving forward  - Continue empiric ABX with Vanco and Zosyn  - Local wound care per podiatry recs  - Neurovascular checks  - BC show NGTD  - Daily labs  - Monitor        Diabetic foot infection    - See #1          Peripheral vascular disease    -S/P angiogram performed by Dr. Mcdonald in early June to left leg (tibial and posterior tibial artery atherectomy with a balloon angioplasty and a left SFA atherectomy with balloon angioplasty).   - Vascular evaluated; left foot arterial blood flow is adequate.   - Continue ASA         Kidney transplant recipient    - Nephrology consulted  - Continue Prograf and Prednisone  - Monitor Prograf levels; currently 5.6          Chronic kidney disease (CKD), stage III (moderate)    - Baseline Cr 1.6 - 1.8  - Nephrology consulted as patient is renal transplant recipient taking Prograf and prednisone          Type 2 diabetes mellitus with hyperglycemia, with long-term current use of insulin    -  A1c 10.2  - BG under fair control here  - Continue Detemir  - Accu checks with sliding scale insulin prn  - Diabetic diet          Chronic bilateral low back pain with left-sided sciatica    - PRN analgesia  - Monitor          Long term current use of immunosuppressive drug    - Continue Prednisone and Prograf per Nephrology             Essential hypertension    - BP well controlled  - Continue Norvasc  - Monitor            VTE Risk Mitigation         Ordered     Place sequential compression device  Until discontinued      07/07/18 1441     IP VTE HIGH RISK PATIENT  Once      07/06/18 1914     Reason for no Mechanical VTE Prophylaxis  Once     Hold off on pharmacological AC for now pending need for surgery. 07/06/18 1914              hSeyla Robbins, MARCELINO, ACNP-BC  Department of Hospital Medicine   [facility]

## 2018-07-08 NOTE — ASSESSMENT & PLAN NOTE
- A1c 10.2  - BG under fair control here  - Continue Detemir  - Accu checks with sliding scale insulin prn  - Diabetic diet

## 2018-07-08 NOTE — PLAN OF CARE
Problem: Patient Care Overview  Goal: Plan of Care Review  Outcome: Ongoing (interventions implemented as appropriate)  Fall prevention precautions maintained, pt remained free of falls throughout shift, call bell and personal items within reach, pt's pain adequately controlled by prn pain medication, IV antibiotics continued. 24 hour chart check completed. Will continue to monitor

## 2018-07-08 NOTE — CONSULTS
"[facility] -   Vascular Surgery  Consult Note    Consults  Subjective:     Chief Complaint/Reason for Admission: non-healing left 1st toe gangrene    History of Present Illness: 62 year old male with type II diabetes mellitus, diabetic neuropathy, chronic back pain, right above the knee amputation and history of kidney transplant on 5/21/16, on Prograf and prednisone, who presents to the ED for an darkened wound on his left distal hallux. He describes increased pain and swelling to the left foot. He states the area of eschar has increased, the nail has fallen off and there is clear fluid drainage from the wound. He denies fever, chills.          Arterial US indicates normal velocities within the common femoral, superficial femoral, popliteal and deep profunda - no focal stenosis or occlusion.          Plain film shows a non healed fracture of the base of the proximal phalanx of the 2nd toe, no air in the soft tissues. CBC is unremarkable, CMP shows hyponatremia, CKD stage 3 (baseline creatinine 1.6 - 1.8), glucose 356, CRP 9.6, lactate 2.2. Pt was admitted in early June for toe infection, treated with ABX and underwent angiogram performed by Dr. Mcdonald.  (tibial and posterior tibial artery atherectomy with a balloon angioplasty and a left SFA atherectomy with balloon angioplasty). He was discharged on Keflex. Mr. Ladd returned to the ED on 6/29 for toe infection and prescribed Clindamycin.    Prescriptions Prior to Admission   Medication Sig Dispense Refill Last Dose    amLODIPine (NORVASC) 2.5 MG tablet Take 1 tablet (2.5 mg total) by mouth once daily. 30 tablet 11 7/6/2018    aspirin (ECOTRIN) 81 MG EC tablet Take 1 tablet (81 mg total) by mouth once daily.  0 7/6/2018    BD INSULIN PEN NEEDLE UF SHORT 31 gauge x 5/16" Ndle    7/6/2018    BD INSULIN SYRINGE ULTRA-FINE 1 mL 31 gauge x 5/16 Syrg USE ONE AS DIRECTED FOUR TIMES DAILY WITH MEALS AND AT BEDTIME 120 each 10 7/6/2018    blood sugar " "diagnostic Strp 1 each by Misc.(Non-Drug; Combo Route) route 3 (three) times daily. 100 each 11 7/6/2018    blood-glucose meter kit Use as instructed 1 each 0 7/6/2018    ergocalciferol (ERGOCALCIFEROL) 50,000 unit Cap Take 1 capsule (50,000 Units total) by mouth every 7 days. Take on Mondays 4 capsule 11 7/6/2018    insulin NPH (NOVOLIN N) 100 unit/mL injection Take 25 units subq with breakfast and 15 units at bedtime 4 vial 0 7/6/2018    lancets Misc 1 each by Misc.(Non-Drug; Combo Route) route 3 (three) times daily. 100 each 11 7/6/2018    NOVOLIN R REGULAR U-100 INSULN 100 unit/mL injection INJECT 6 UNITS WITH BREAKFAST AND 8 UNITS WITH DINNER, SUBCUTANEOUSLY 30 MIN BEFORE MEAL. 10 mL 1 7/6/2018    pen needle, diabetic (EASY COMFORT PEN NEEDLES) 32 gauge x 5/32" Ndle Inject 1 each into the skin 3 (three) times daily. 100 each 11 7/6/2018    pen needle, diabetic 31 gauge x 1/4" Ndle 1 each by Misc.(Non-Drug; Combo Route) route 4 (four) times daily. 100 each 0 7/6/2018    predniSONE (DELTASONE) 5 MG tablet Take 1 tablet (5 mg total) by mouth once daily. 30 tablet 11 7/6/2018    sodium bicarbonate 650 MG tablet Take 4 tablets (2,600 mg total) by mouth 2 (two) times daily. 240 tablet 11 7/6/2018    tacrolimus (PROGRAF) 0.5 MG Cap Take 3 capsules (1.5 mg total) by mouth every 12 (twelve) hours. Z94.0 Kidney Transplant 180 capsule 11 7/6/2018    zolpidem (AMBIEN) 5 MG Tab Take 2 tablets (10 mg total) by mouth nightly as needed. 30 tablet 1 7/5/2018    budesonide-formoterol 160-4.5 mcg (SYMBICORT) 160-4.5 mcg/actuation HFAA Inhale 2 puffs into the lungs every 12 (twelve) hours. Wash out mouth after using 1 Inhaler 12 Unknown    inhalation device (BREATHERITE VALVED MDI CHAMBER) Use as directed for inhalation. 1 Device prn Unknown    ondansetron (ZOFRAN-ODT) 4 MG TbDL Take 1 tablet (4 mg total) by mouth every 8 (eight) hours as needed. 21 tablet 1 More than a month       Review of patient's allergies " indicates:  No Known Allergies    Past Medical History:   Diagnosis Date    DINORAH (acute kidney injury) 2016    Arthritis     Chronic obstructive pulmonary disease 2016    Coronary artery disease involving native coronary artery of native heart without angina pectoris 2016     donor kidney transplant for DM 16     Induction with Thymo x3 and IV solumedrol to total 875mg  Kidney Biopsy  2016: 16 glomeruli, ACR type 1 AVR type 2, significant microcirculatory changes, c4d negative, No DSA, 5 to10% fibrosis. Treated with thymo x8 2016- no rejection      Diastolic heart failure     Encounter for blood transfusion     ESRD on RRT since 10/2013 10/29/2013    Biopsy proven diabetic nephropathy and lymphoplasmacytic interstitial infiltrate not c/w with AIN (ddx sjogrens or assoc with tamm-horsefall protein extravasation)     GERD (gastroesophageal reflux disease)     History of hepatitis C, s/p successful Rx w/ SVR12 - 2017    Completed 12 weeks harvoni w/ SVR    Hyperlipidemia     Hypertension     Prophylactic immunotherapy     Proteinuria     PVD (peripheral vascular disease) 2017    RLE BKA CT 16 Extensive atherosclerotic disease of the aorta and mesenteric arteries.     Renal hypertension     Type 2 diabetes mellitus with diabetic neuropathy, with long-term current use of insulin 2016    Vitamin B12 deficiency      Past Surgical History:   Procedure Laterality Date    AORTOGRAPHY WITH SERIALOGRAPHY N/A 2018    Procedure: LEFT LEG ANGIOGRAM;  Surgeon: Donal Mcdonald MD;  [facility] C[facility] LAB;  Service: Vascular;  Laterality: N/A;    av bovine graft      Left UE    AV FISTULA PLACEMENT      left UE    CARDIAC CATHETERIZATION  2015    KIDNEY TRANSPLANT  2016    LEG AMPUTATION THROUGH KNEE      right LE, started as nail puncture leading to diabetic ulcer     Family History     Problem Relation (Age of Onset)    Cancer  Father    Diabetes Father    Heart failure Father, Mother    Stroke Father        Social History Main Topics    Smoking status: Former Smoker     Packs/day: 1.00     Years: 40.00     Quit date: 1/11/2013    Smokeless tobacco: Never Used    Alcohol use 3.6 oz/week     6 Cans of beer per week      Comment: 6 beers/week    Drug use: No    Sexual activity: No     Review of Systems   Negative except for above    Objective:     Vital Signs (Most Recent):  Temp: 97.8 °F (36.6 °C) (07/08/18 0800)  Pulse: 72 (07/08/18 0800)  Resp: 16 (07/08/18 0800)  BP: 114/80 (07/08/18 0800)  SpO2: (!) 94 % (07/08/18 0800) Vital Signs (24h Range):  Temp:  [96.7 °F (35.9 °C)-97.9 °F (36.6 °C)] 97.8 °F (36.6 °C)  Pulse:  [68-86] 72  Resp:  [16-20] 16  SpO2:  [94 %-97 %] 94 %  BP: (114-139)/(61-84) 114/80     Weight: 101.4 kg (223 lb 8.7 oz)  Body mass index is 31.18 kg/m².         Physical Exam  Constitutional: He is oriented to person, place, and time. He appears well-developed and well-nourished. No distress.   HENT:   Head: Normocephalic and atraumatic.   Eyes: Conjunctivae and EOM are normal. Right eye exhibits no discharge.   Neck: Normal range of motion. Neck supple. No tracheal deviation present. No thyromegaly present.   Cardiovascular: Normal rate, regular rhythm and normal heart sounds.  Exam reveals no gallop and no friction rub.    No murmur heard.  Left PT and DP pulses audible per doppler   Pulmonary/Chest: Effort normal and breath sounds normal. He has no wheezes. He has no rales.   Abdominal: Soft. Bowel sounds are normal. He exhibits no mass. There is no tenderness. There is no rebound and no guarding.   Musculoskeletal: He exhibits edema.   Left toe gangrene , s/p right BKA, mild edema to left foot   Lymphadenopathy:     He has no cervical adenopathy.   Neurological: He is alert and oriented to person, place, and time.   Skin: Skin is warm. No rash noted. He is not diaphoretic. There is erythema.   Eschar to distal  left great toe, nail is removed from nailbed, mild erythema present to the toe   Psychiatric: He has a normal mood and affect. His behavior is normal.   Nursing note and vitals reviewed.    Significant Labs:  BMP:   Recent Labs  Lab 07/08/18  0429   *      K 4.3      CO2 22*   BUN 22   CREATININE 1.8*   CALCIUM 9.3     CBC:   Recent Labs  Lab 07/08/18  0429   WBC 5.02   RBC 4.77   HGB 14.7   HCT 44.5      MCV 93   MCH 30.8   MCHC 33.0     Coagulation:   Recent Labs  Lab 07/06/18  1521   LABPROT 9.8   INR 0.9   APTT 23.9       Significant Diagnostics:  Left Lower Extremity arterial ultrasound:Left lower extremity arterial Doppler evaluation demonstrates diffuse atherosclerotic changes.  Velocities within the common femoral, superficial femoral, popliteal and deep profunda are within normal limits without evidence of focal stenosis or occlusion.  Anterior tibial, posterior tibial and dorsalis pedis arteries are patent with slightly diminished flow, but no evidence of focal stenosis or occlusion.    Assessment/Plan:     Active Diagnoses:    Diagnosis Date Noted POA    PRINCIPAL PROBLEM:  Gangrene of toe of left foot [I96] 07/06/2018 Yes    Long term current use of immunosuppressive drug [Z79.899] 07/06/2018 Not Applicable    Peripheral vascular disease [I73.9] 07/06/2018 Yes    Diabetic foot infection [E11.628, L08.9] 07/06/2018 Yes    Chronic bilateral low back pain with left-sided sciatica [M54.42, G89.29] 01/25/2018 Yes    Kidney transplant recipient [Z94.0] 04/07/2017 Not Applicable    Chronic kidney disease (CKD), stage III (moderate) [N18.3] 09/25/2016 Yes     Chronic    Type 2 diabetes mellitus with hyperglycemia, with long-term current use of insulin [E11.65, Z79.4]  Not Applicable    Essential hypertension [I10] 02/20/2015 Yes      Problems Resolved During this Admission:    Diagnosis Date Noted Date Resolved POA     Nonhealing left 1st toe gangrene  Left foot arterial blood  flow is adequate   Ok for podiatry to proceed with OR for aggressive local debridement/amputation  reconsult if any further assistance is required  Will sign off    Thank you for your consult. I will sign off. Please contact us if you have any additional questions.    Horacio Uribe IV, MD  Vascular Surgery  [facility] [place]

## 2018-07-08 NOTE — ASSESSMENT & PLAN NOTE
1. S/p DDRT in 2016 : stable allograft fn , Cr at baseline at 1.8 mg/dl,     cont Prograf 1.5 mg bid and Pred 5 mg daily , follow Tac level ,      2. HTN : on amlodipine ,      3. Left toe gangrene,  Podiatry and vascular following , on broad spectrum abx , follow Vanc levels closely,      4. Metabolic acidosis : cont Bicarb supplements      5. Anemia of CKD : stable Hb

## 2018-07-08 NOTE — CONSULTS
Subjective:     Patient ID: Lavelle Ladd is a 62 y.o. male.    Chief Complaint: Foot Pain (left foot wound and infection)    Lavelle is a 62 y.o. male who presents to the clinic for evaluation and treatment of high risk feet. Lavelle has a past medical history of DINORAH (acute kidney injury) (2016); Arthritis; Chronic obstructive pulmonary disease (2016); Coronary artery disease involving native coronary artery of native heart without angina pectoris (2016);  donor kidney transplant for DM 16; Diastolic heart failure; Encounter for blood transfusion; ESRD on RRT since 10/2013 (10/29/2013); GERD (gastroesophageal reflux disease); History of hepatitis C, s/p successful Rx w/ SVR12 - 2017 (2017); Hyperlipidemia; Hypertension; Prophylactic immunotherapy; Proteinuria; PVD (peripheral vascular disease) (2017); Renal hypertension; Type 2 diabetes mellitus with diabetic neuropathy, with long-term current use of insulin (2016); and Vitamin B12 deficiency. The patient's chief complaint is left toe wound. Patient states he clipped toenail about 2 months ago and cut and the wound started after that. Patient states he was seen in the ER last week and they cleaned toe up and gave him antibiotics. Patient states he has been applying neosporin to the toe and taking the antibiotics. Patient admits minimal drainage from the toe. This patient has documented high risk feet requiring routine maintenance secondary to peripheral vascular disease.      Hemoglobin A1C   Date Value Ref Range Status   2018 10.2 (H) 4.0 - 5.6 % Final     Comment:     ADA Screening Guidelines:  5.7-6.4%  Consistent with prediabetes  >or=6.5%  Consistent with diabetes  High levels of fetal hemoglobin interfere with the HbA1C  assay. Heterozygous hemoglobin variants (HbS, HgC, etc)do  not significantly interfere with this assay.   However, presence of multiple variants may affect accuracy.     2018 9.9 (H)  4.0 - 5.6 % Final     Comment:     ADA Screening Guidelines:  5.7-6.4%  Consistent with prediabetes  >or=6.5%  Consistent with diabetes  High levels of fetal hemoglobin interfere with the HbA1C  assay. Heterozygous hemoglobin variants (HbS, HgC, etc)do  not significantly interfere with this assay.   However, presence of multiple variants may affect accuracy.     2017 7.1 (H) 4.0 - 5.6 % Final     Comment:     According to ADA guidelines, hemoglobin A1c <7.0% represents  optimal control in non-pregnant diabetic patients. Different  metrics may apply to specific patient populations.   Standards of Medical Care in Diabetes-2016.  For the purpose of screening for the presence of diabetes:  <5.7%     Consistent with the absence of diabetes  5.7-6.4%  Consistent with increasing risk for diabetes   (prediabetes)  >or=6.5%  Consistent with diabetes  Currently, no consensus exists for use of hemoglobin A1c  for diagnosis of diabetes for children.  This Hemoglobin A1c assay has significant interference with fetal   hemoglobin   (HbF). The results are invalid for patients with abnormal amounts of   HbF,   including those with known Hereditary Persistence   of Fetal Hemoglobin. Heterozygous hemoglobin variants (HbAS, HbAC,   HbAD, HbAE, HbA2) do not significantly interfere with this assay;   however, presence of multiple variants in a sample may impact the %   interference.             Patient Active Problem List   Diagnosis    Renal hypertension    GERD (gastroesophageal reflux disease)    Peptic ulcer disease    Malignant HTN with heart disease, w/o CHF, with chronic kidney disease    Acidosis    Essential hypertension    Acute diastolic heart failure    Gastroparesis     donor kidney transplant for DM 16    Prophylactic immunotherapy    Adverse effect of glucocorticoids and synthetic analogues, sequela    Osteoarthritis of lumbar spine    Osteoarthritis of thoracic spine with myelopathy     "Type 2 diabetes mellitus with hyperglycemia, with long-term current use of insulin    Coronary artery disease involving native coronary artery of native heart without angina pectoris    Hyperlipidemia    Chronic obstructive pulmonary disease    Ulnar neuropathy    Chronic kidney disease (CKD), stage III (moderate)    Reactive airway disease    Kidney transplant rejection    Type 2 diabetes mellitus with retinopathy, with long-term current use of insulin    Type 2 diabetes mellitus with diabetic neuropathy, with long-term current use of insulin    H/O amputation    Cavitary lesion of lung    Opacity of lung on imaging study    Bacteremia due to Gram-negative bacteria    ESBL (extended spectrum beta-lactamase) producing bacteria infection    History of hepatitis C, s/p successful Rx w/ SVR12 - 4/2017    Kidney transplant recipient    Intractable vomiting with nausea    PVD (peripheral vascular disease)    Chronic bilateral low back pain with left-sided sciatica    Diabetic polyneuropathy    Other chest pain    Disc disease, degenerative, lumbar or lumbosacral    Numbness and tingling    Adjustment insomnia    Lumbar stenosis with neurogenic claudication    Open wound of left great toe    Gangrene    Acute lumbar radiculopathy    Chronic lumbar radiculopathy    Gangrene of toe of left foot    Long term current use of immunosuppressive drug    Peripheral vascular disease    Diabetic foot infection       Current Discharge Medication List      CONTINUE these medications which have NOT CHANGED    Details   amLODIPine (NORVASC) 2.5 MG tablet Take 1 tablet (2.5 mg total) by mouth once daily.  Qty: 30 tablet, Refills: 11      aspirin (ECOTRIN) 81 MG EC tablet Take 1 tablet (81 mg total) by mouth once daily.  Refills: 0      BD INSULIN PEN NEEDLE UF SHORT 31 gauge x 5/16" Ndle       BD INSULIN SYRINGE ULTRA-FINE 1 mL 31 gauge x 5/16 Syrg USE ONE AS DIRECTED FOUR TIMES DAILY WITH MEALS AND AT " "BEDTIME  Qty: 120 each, Refills: 10      blood sugar diagnostic Strp 1 each by Misc.(Non-Drug; Combo Route) route 3 (three) times daily.  Qty: 100 each, Refills: 11      blood-glucose meter kit Use as instructed  Qty: 1 each, Refills: 0      ergocalciferol (ERGOCALCIFEROL) 50,000 unit Cap Take 1 capsule (50,000 Units total) by mouth every 7 days. Take on Mondays  Qty: 4 capsule, Refills: 11      insulin NPH (NOVOLIN N) 100 unit/mL injection Take 25 units subq with breakfast and 15 units at bedtime  Qty: 4 vial, Refills: 0    Associated Diagnoses: Type 2 diabetes mellitus with hyperglycemia, with long-term current use of insulin      lancets Misc 1 each by Misc.(Non-Drug; Combo Route) route 3 (three) times daily.  Qty: 100 each, Refills: 11      NOVOLIN R REGULAR U-100 INSULN 100 unit/mL injection INJECT 6 UNITS WITH BREAKFAST AND 8 UNITS WITH DINNER, SUBCUTANEOUSLY 30 MIN BEFORE MEAL.  Qty: 10 mL, Refills: 1    Associated Diagnoses: Type 2 diabetes mellitus with hyperglycemia, with long-term current use of insulin      pen needle, diabetic (EASY COMFORT PEN NEEDLES) 32 gauge x 5/32" Ndle Inject 1 each into the skin 3 (three) times daily.  Qty: 100 each, Refills: 11      pen needle, diabetic 31 gauge x 1/4" Ndle 1 each by Misc.(Non-Drug; Combo Route) route 4 (four) times daily.  Qty: 100 each, Refills: 0      predniSONE (DELTASONE) 5 MG tablet Take 1 tablet (5 mg total) by mouth once daily.  Qty: 30 tablet, Refills: 11      sodium bicarbonate 650 MG tablet Take 4 tablets (2,600 mg total) by mouth 2 (two) times daily.  Qty: 240 tablet, Refills: 11      tacrolimus (PROGRAF) 0.5 MG Cap Take 3 capsules (1.5 mg total) by mouth every 12 (twelve) hours. Z94.0 Kidney Transplant  Qty: 180 capsule, Refills: 11    Comments: KTx 5/21/16; Z94.0      zolpidem (AMBIEN) 5 MG Tab Take 2 tablets (10 mg total) by mouth nightly as needed.  Qty: 30 tablet, Refills: 1      budesonide-formoterol 160-4.5 mcg (SYMBICORT) 160-4.5 " mcg/actuation HFAA Inhale 2 puffs into the lungs every 12 (twelve) hours. Wash out mouth after using  Qty: 1 Inhaler, Refills: 12    Associated Diagnoses: Asthma with COPD      inhalation device (BREATHERITE VALVED MDI CHAMBER) Use as directed for inhalation.  Qty: 1 Device, Refills: prn    Associated Diagnoses: Asthma with COPD      ondansetron (ZOFRAN-ODT) 4 MG TbDL Take 1 tablet (4 mg total) by mouth every 8 (eight) hours as needed.  Qty: 21 tablet, Refills: 1             Review of patient's allergies indicates:  No Known Allergies    Past Surgical History:   Procedure Laterality Date    AORTOGRAPHY WITH SERIALOGRAPHY N/A 6/14/2018    Procedure: LEFT LEG ANGIOGRAM;  Surgeon: Donal Mcdonald MD;  Location[facility] CATH LAB;  Service: Vascular;  Laterality: N/A;    av bovine graft      Left UE    AV FISTULA PLACEMENT      left UE    CARDIAC CATHETERIZATION  02/2015    KIDNEY TRANSPLANT  05/21/2016    LEG AMPUTATION THROUGH KNEE  2011    right LE, started as nail puncture leading to diabetic ulcer       Family History   Problem Relation Age of Onset    Cancer Father     Diabetes Father     Heart failure Father     Stroke Father     Heart failure Mother     Kidney disease Neg Hx        Social History     Social History    Marital status: Single     Spouse name: N/A    Number of children: N/A    Years of education: N/A     Occupational History    Disabled      Disabled     Social History Main Topics    Smoking status: Former Smoker     Packs/day: 1.00     Years: 40.00     Quit date: 1/11/2013    Smokeless tobacco: Never Used    Alcohol use 3.6 oz/week     6 Cans of beer per week      Comment: 6 beers/week    Drug use: No    Sexual activity: No     Other Topics Concern    Not on file     Social History Narrative    Lives alone. Retired from construction and various jobs, now retired. Would like to return to work PT to alleviate boredom.       Vitals:    07/07/18 1201 07/07/18 1550 07/07/18 1954  07/07/18 2007   BP: 126/82 130/84 139/75    Pulse: 69 70 69 70   Resp: 18 18 18 18   Temp: 97.7 °F (36.5 °C) 97.9 °F (36.6 °C) 97.5 °F (36.4 °C)    TempSrc: Oral  Oral    SpO2: 95% (!) 94% 97% 97%   Weight:       Height:           Hemoglobin A1C   Date Value Ref Range Status   07/07/2018 10.2 (H) 4.0 - 5.6 % Final     Comment:     ADA Screening Guidelines:  5.7-6.4%  Consistent with prediabetes  >or=6.5%  Consistent with diabetes  High levels of fetal hemoglobin interfere with the HbA1C  assay. Heterozygous hemoglobin variants (HbS, HgC, etc)do  not significantly interfere with this assay.   However, presence of multiple variants may affect accuracy.     06/12/2018 9.9 (H) 4.0 - 5.6 % Final     Comment:     ADA Screening Guidelines:  5.7-6.4%  Consistent with prediabetes  >or=6.5%  Consistent with diabetes  High levels of fetal hemoglobin interfere with the HbA1C  assay. Heterozygous hemoglobin variants (HbS, HgC, etc)do  not significantly interfere with this assay.   However, presence of multiple variants may affect accuracy.     11/03/2017 7.1 (H) 4.0 - 5.6 % Final     Comment:     According to ADA guidelines, hemoglobin A1c <7.0% represents  optimal control in non-pregnant diabetic patients. Different  metrics may apply to specific patient populations.   Standards of Medical Care in Diabetes-2016.  For the purpose of screening for the presence of diabetes:  <5.7%     Consistent with the absence of diabetes  5.7-6.4%  Consistent with increasing risk for diabetes   (prediabetes)  >or=6.5%  Consistent with diabetes  Currently, no consensus exists for use of hemoglobin A1c  for diagnosis of diabetes for children.  This Hemoglobin A1c assay has significant interference with fetal   hemoglobin   (HbF). The results are invalid for patients with abnormal amounts of   HbF,   including those with known Hereditary Persistence   of Fetal Hemoglobin. Heterozygous hemoglobin variants (HbAS, HbAC,   HbAD, HbAE, HbA2) do not  significantly interfere with this assay;   however, presence of multiple variants in a sample may impact the %   interference.         Review of Systems   Constitutional: Negative for chills and fever.   Respiratory: Negative for shortness of breath.    Cardiovascular: Negative for chest pain, palpitations, orthopnea, claudication and leg swelling.   Gastrointestinal: Negative for diarrhea, nausea and vomiting.   Musculoskeletal: Negative for joint pain.   Skin: Negative for rash.   Neurological: Positive for sensory change. Negative for dizziness, tingling, focal weakness and weakness.   Psychiatric/Behavioral: Negative.              Objective:      PHYSICAL EXAM: Apperance: Alert and orient in no distress,well developed, and with good attention to grooming and body habits  Lower Extremity Exam  VASCULAR: Dorsalis pedis pulses 0/4 left and Posterior Tibial pulses 1/4 left. Capillary fill time <4 seconds left. Mild edema observed left. Varicosities present left. Skin temperature of the lower extremities is warm to warm, proximal to distal. Hair growth absent left. (--) lymphangitis or (+) cellulitis noted left.  DERMATOLOGICAL: (+) edema, (+) minimal erythema, (--) malodor, (--) drainage, (+) warmth to left foot.  Ulcer noted to left distal hallux and dorsal hallux  with eschar base. Ulcer measurement (dorsal) 0.5cm x 0.5cm (distal) 1.5cm x 1cm.  The ulcer does not extend into deeper tissue and (--) sinus tracts exist.  The dorsum surface of the feet are soft and supple.  The plantar aspects of feet are dry and scaly. Webspaces 1,2,3,4 clean, dry and without evidence of break in skin integrity left.   NEUROLOGICAL: Light touch, sharp-dull, proprioception all present and equal bilaterally.    MUSCULOSKELETAL: Muscle strength is 5/5 for foot inverters, everters, plantarflexors, and dorsiflexors. Muscle tone is normal.  Inspection/palpation of bone, joints and muscles unremarkable with the exception of that noted  above. Right BKA noted.                   Imaging Results          US Lower Extremity Arteries Left (Final result)  Result time 07/06/18 17:53:04    Final result by Porter Young MD (07/06/18 17:53:04)                 Impression:      1. Diffuse atherosclerotic change of the left lower extremity arteries.  2. No findings suspicious for focal stenosis or occlusion.      Electronically signed by: Porter Young MD  Date:    07/06/2018  Time:    17:53             Narrative:    EXAMINATION:  US LOWER EXTREMITY ARTERIES LEFT    CLINICAL HISTORY:  LEFT leg infection; peripheral vascular disease.  History of previous left lower extremity angioplasty.    COMPARISON:  None    FINDINGS:  Left lower extremity arterial Doppler evaluation demonstrates diffuse atherosclerotic changes.  Velocities within the common femoral, superficial femoral, popliteal and deep profunda are within normal limits without evidence of focal stenosis or occlusion.  Anterior tibial, posterior tibial and dorsalis pedis arteries are patent with slightly diminished flow, but no evidence of focal stenosis or occlusion.                               X-Ray Foot Complete Left (Final result)  Result time 07/06/18 15:57:20    Final result by Porter Armas MD (07/06/18 15:57:20)                 Impression:      No acute findings.  No change in the incompletely healed fracture of the base of the proximal phalanx of the 2nd toe.      Electronically signed by: Porter Armas MD  Date:    07/06/2018  Time:    15:57             Narrative:    EXAMINATION:  XR FOOT COMPLETE 3 VIEW LEFT    CLINICAL HISTORY:  Local infection of the skin and subcutaneous tissue, unspecified    COMPARISON:  06/28/2018    FINDINGS:  There is unchanged non healed fracture of the base of the proximal phalanx of the 2nd toe.  No acute bony or joint abnormality is seen.  Extensive arterial calcifications.  No air in the soft tissues.                                       Assessment:       Small Vessel disease  Gangrene    Left foot infection  -     X-Ray Foot Complete Left; Standing    Long term current use of immunosuppressive drug    Kidney transplant recipient    Type 2 diabetes mellitus with diabetic neuropathy, with long-term current use of insulin    PVD (peripheral vascular disease)    Diabetic foot infection    Gangrene of toe of left foot    Other orders  -     Saline lock IV; Standing  -     CBC auto differential; Standing  -     Comprehensive metabolic panel; Standing  -     Sedimentation rate; Standing  -     C-reactive protein; Standing  -     Lactic acid, plasma; Standing  -     Protime-INR; Standing  -     APTT; Standing  -     US Lower Extremity Arteries Left; Standing  -     Blood culture; Standing  -     Blood Culture #2 **CANNOT BE ORDERED STAT**; Standing  -     Pharmacy to dose Vancomycin consult; Standing  -     piperacillin-tazobactam 4.5 g in dextrose 5 % 100 mL IVPB (ready to mix system); Inject 100 mLs (4.5 g total) into the vein ED 1 Time.  -     vancomycin in dextrose 5 % 1 gram/250 mL IVPB 1,000 mg; Inject 250 mLs (1,000 mg total) into the vein ED 1 Time.  -     Vital signs; Standing  -     Notify physician ; Standing  -     IP VTE HIGH RISK PATIENT; Standing  -     Cancel: glucose chewable tablet 16 g; Take 4 tablets (16 g total) by mouth as needed for Blood Glucose less than (70 mg/dL X 1 dose for patients who can take PO.).  -     Cancel: glucose chewable tablet 24 g; Take 6 tablets (24 g total) by mouth as needed for Blood Glucose less than (50 mg/dL X 1 dose for patients who can take PO.).  -     Cancel: dextrose 50% injection 12.5 g; Inject 25 mLs (12.5 g total) into the vein as needed for For blood glucose (between 51 - 70 mg/dL if patient cannnot take PO but has IV access.).  -     Cancel: dextrose 50% injection 25 g; Inject 50 mLs (25 g total) into the vein as needed for For blood glucose (less than 50 mg/dL if patient cannnot take PO but has  IV access.).  -     Cancel: glucagon (human recombinant) injection 1 mg; Inject 1 mg into the muscle as needed for Blood Glucose less than (70 mg/dL if patient is unconscious or obtunded and DOES NOT HAVE IV ACCESS.).  -     Cancel: Recheck Blood Glucose:; Standing  -     Pulse Oximetry Q4H; Standing  -     Cancel: POCT glucose; Standing  -     Cancel: If any glucose result is less than 50 or greater than 400:; Standing  -     Cancel: If 2nd result is less than 50 or greater than 400:; Standing  -     Do not admin Aspart correction between scheduled prandial Aspart; Standing  -     Full code; Standing  -     Admit to Inpatient; Standing  -     Up in chair; Standing  -     Cancel: Diet diabetic [facility]; 2000 Calorie; Standing  -     Reason for no Mechanical VTE Prophylaxis; Standing  -     Cancel: enoxaparin injection 40 mg; Inject 0.4 mLs (40 mg total) into the skin Daily.  -     Discontinue: piperacillin-tazobactam 4.5 g in dextrose 5 % 100 mL IVPB (ready to mix system); Inject 100 mLs (4.5 g total) into the vein every 8 (eight) hours.  -     morphine injection 2 mg; Inject 0.5 mLs (2 mg total) into the vein every 4 (four) hours as needed for moderate pain 4-6/10 pain scale (not relieved by oral medication).  -     morphine injection 4 mg; Inject 1 mL (4 mg total) into the vein every 4 (four) hours as needed for severe pain 7-10/10 pain scale.  -     ondansetron injection 4 mg; Inject 4 mg into the vein every 8 (eight) hours as needed for Nausea/Vomiting (1st choice) - use as first treatment.  -     Cancel: insulin aspart U-100 pen 0-5 Units; Inject 0-5 Units into the skin before meals and at bedtime as needed for For blood glucose (Hyperglycemia).  -     Cancel: Hemoglobin A1c; Standing  -     Cancel: CBC auto differential; Standing  -     Cancel: Comprehensive metabolic panel; Standing  -     Inpatient consult to Vascular Surgery; Standing  -     Pharmacy to dose Vancomycin consult; Standing  -      Cancel: Diet NPO; Standing  -     Discontinue: piperacillin-tazobactam 4.5 g in dextrose 5 % 100 mL IVPB (ready to mix system); Inject 100 mLs (4.5 g total) into the vein every 8 (eight) hours.  -     Inpatient consult to Podiatry; Standing  -     amLODIPine tablet 2.5 mg; Take 1 tablet (2.5 mg total) by mouth once daily.  -     aspirin EC tablet 81 mg; Take 1 tablet (81 mg total) by mouth once daily.  -     predniSONE tablet 5 mg; Take 1 tablet (5 mg total) by mouth once daily.  -     sodium bicarbonate tablet 2,600 mg; Take 4 tablets (2,600 mg total) by mouth 2 (two) times daily.  -     tacrolimus capsule 1.5 mg; Take 1.5 mg by mouth 0800, 1800.  -     0.9%  NaCl infusion; Inject into the vein continuous.  -     insulin detemir U-100 pen 10 Units; Inject 10 Units into the skin every evening.  -     budesonide nebulizer solution 0.5 mg; Take 2 mLs (0.5 mg total) by nebulization 2 (two) times daily.  -     Inhalation Treatment Daily; Standing  -     Cancel: Inhalation Treatment Q12H; Standing  -     Discontinue: formoterol nebulizer solution 20 mcg; Take 2 mLs (20 mcg total) by nebulization every 12 (twelve) hours.  -     glucose chewable tablet 16 g; Take 4 tablets (16 g total) by mouth as needed for Blood Glucose less than (70 mg/dL X 1 dose for patients who can take PO.).  -     glucose chewable tablet 24 g; Take 6 tablets (24 g total) by mouth as needed for Blood Glucose less than (50 mg/dL X 1 dose for patients who can take PO.).  -     dextrose 50% injection 12.5 g; Inject 25 mLs (12.5 g total) into the vein as needed for For blood glucose (between 51 - 70 mg/dL if patient cannnot take PO but has IV access.).  -     dextrose 50% injection 25 g; Inject 50 mLs (25 g total) into the vein as needed for For blood glucose (less than 50 mg/dL if patient cannnot take PO but has IV access.).  -     glucagon (human recombinant) injection 1 mg; Inject 1 mg into the muscle as needed for Blood Glucose less than (70 mg/dL  if patient is unconscious or obtunded and DOES NOT HAVE IV ACCESS.).  -     Recheck Blood Glucose:; Standing  -     Cancel: POCT glucose; Standing  -     If any glucose result is less than 50 or greater than 400:; Standing  -     If 2nd result is less than 50 or greater than 400:; Standing  -     Do not admin Aspart correction between scheduled prandial Aspart; Standing  -     insulin aspart U-100 pen 1-10 Units; Inject 1-10 Units into the skin before meals and at bedtime as needed for For blood glucose (Hyperglycemia).  -     Neurovascular checks; Standing  -     Inpatient consult to Nephrology; Standing  -     arformoterol nebulizer solution 15 mcg; Take 2 mLs (15 mcg total) by nebulization 2 (two) times daily.  -     Inhalation Treatment BID; Standing  -     POCT glucose; Standing  -     Discontinue: vancomycin in dextrose 5 % 1 gram/250 mL IVPB 1,000 mg; Inject 250 mLs (1,000 mg total) into the vein every 48 hours.  -     Cancel: Vancomycin, random; Standing  -     Cancel: Hemoglobin A1c; Standing  -     IP consult to case management; Standing  -     CBC auto differential; Standing  -     Cancel: Comprehensive metabolic panel; Standing  -     Hemoglobin A1c; Standing  -     Vancomycin, random; Standing  -     POCT glucose; Standing  -     POCT glucose; Standing  -     vancomycin (VANCOCIN) 1,250 mg in dextrose 5 % 250 mL IVPB; Inject 1,250 mg into the vein ED 1 Time.  -     Cancel: Diet NPO Except for: Sips with Medication; Standing  -     piperacillin-tazobactam 4.5 g in dextrose 5 % 100 mL IVPB (ready to mix system); Inject 100 mLs (4.5 g total) into the vein every 8 (eight) hours.  -     vancomycin in dextrose 5 % 1 gram/250 mL IVPB 1,000 mg; Inject 250 mLs (1,000 mg total) into the vein every 48 hours.  -     Vancomycin, random; Standing  -     POCT glucose; Standing  -     Diet diabetic [facility]; 1500 Calorie; Standing  -     silver sulfADIAZINE 1% cream; Apply topically once daily.  -     Change  dressing; Standing  -     Tacrolimus level; Standing  -     Renal function panel; Standing  -     Urinalysis; Standing  -     Place sequential compression device; Standing  -     Urinalysis Microscopic; Standing  -     POCT glucose; Standing  -     US Lower Extremity Arteries Left; Standing  -     US Lower Extrem Arteries Bilat with LYNN (xpd); Standing            Plan:   Gangrene    Left foot infection  -     X-Ray Foot Complete Left; Standing    Long term current use of immunosuppressive drug    Kidney transplant recipient    Type 2 diabetes mellitus with diabetic neuropathy, with long-term current use of insulin    PVD (peripheral vascular disease)    Diabetic foot infection    Gangrene of toe of left foot    Other orders  -     Saline lock IV; Standing  -     CBC auto differential; Standing  -     Comprehensive metabolic panel; Standing  -     Sedimentation rate; Standing  -     C-reactive protein; Standing  -     Lactic acid, plasma; Standing  -     Protime-INR; Standing  -     APTT; Standing  -     US Lower Extremity Arteries Left; Standing  -     Blood culture; Standing  -     Blood Culture #2 **CANNOT BE ORDERED STAT**; Standing  -     Pharmacy to dose Vancomycin consult; Standing  -     piperacillin-tazobactam 4.5 g in dextrose 5 % 100 mL IVPB (ready to mix system); Inject 100 mLs (4.5 g total) into the vein ED 1 Time.  -     vancomycin in dextrose 5 % 1 gram/250 mL IVPB 1,000 mg; Inject 250 mLs (1,000 mg total) into the vein ED 1 Time.  -     Vital signs; Standing  -     Notify physician ; Standing  -     IP VTE HIGH RISK PATIENT; Standing  -     Cancel: glucose chewable tablet 16 g; Take 4 tablets (16 g total) by mouth as needed for Blood Glucose less than (70 mg/dL X 1 dose for patients who can take PO.).  -     Cancel: glucose chewable tablet 24 g; Take 6 tablets (24 g total) by mouth as needed for Blood Glucose less than (50 mg/dL X 1 dose for patients who can take PO.).  -     Cancel: dextrose 50%  injection 12.5 g; Inject 25 mLs (12.5 g total) into the vein as needed for For blood glucose (between 51 - 70 mg/dL if patient cannnot take PO but has IV access.).  -     Cancel: dextrose 50% injection 25 g; Inject 50 mLs (25 g total) into the vein as needed for For blood glucose (less than 50 mg/dL if patient cannnot take PO but has IV access.).  -     Cancel: glucagon (human recombinant) injection 1 mg; Inject 1 mg into the muscle as needed for Blood Glucose less than (70 mg/dL if patient is unconscious or obtunded and DOES NOT HAVE IV ACCESS.).  -     Cancel: Recheck Blood Glucose:; Standing  -     Pulse Oximetry Q4H; Standing  -     Cancel: POCT glucose; Standing  -     Cancel: If any glucose result is less than 50 or greater than 400:; Standing  -     Cancel: If 2nd result is less than 50 or greater than 400:; Standing  -     Do not admin Aspart correction between scheduled prandial Aspart; Standing  -     Full code; Standing  -     Admit to Inpatient; Standing  -     Up in chair; Standing  -     Cancel: Diet diabetic [facility]; 2000 Calorie; Standing  -     Reason for no Mechanical VTE Prophylaxis; Standing  -     Cancel: enoxaparin injection 40 mg; Inject 0.4 mLs (40 mg total) into the skin Daily.  -     Discontinue: piperacillin-tazobactam 4.5 g in dextrose 5 % 100 mL IVPB (ready to mix system); Inject 100 mLs (4.5 g total) into the vein every 8 (eight) hours.  -     morphine injection 2 mg; Inject 0.5 mLs (2 mg total) into the vein every 4 (four) hours as needed for moderate pain 4-6/10 pain scale (not relieved by oral medication).  -     morphine injection 4 mg; Inject 1 mL (4 mg total) into the vein every 4 (four) hours as needed for severe pain 7-10/10 pain scale.  -     ondansetron injection 4 mg; Inject 4 mg into the vein every 8 (eight) hours as needed for Nausea/Vomiting (1st choice) - use as first treatment.  -     Cancel: insulin aspart U-100 pen 0-5 Units; Inject 0-5 Units into the skin  before meals and at bedtime as needed for For blood glucose (Hyperglycemia).  -     Cancel: Hemoglobin A1c; Standing  -     Cancel: CBC auto differential; Standing  -     Cancel: Comprehensive metabolic panel; Standing  -     Inpatient consult to Vascular Surgery; Standing  -     Pharmacy to dose Vancomycin consult; Standing  -     Cancel: Diet NPO; Standing  -     Discontinue: piperacillin-tazobactam 4.5 g in dextrose 5 % 100 mL IVPB (ready to mix system); Inject 100 mLs (4.5 g total) into the vein every 8 (eight) hours.  -     Inpatient consult to Podiatry; Standing  -     amLODIPine tablet 2.5 mg; Take 1 tablet (2.5 mg total) by mouth once daily.  -     aspirin EC tablet 81 mg; Take 1 tablet (81 mg total) by mouth once daily.  -     predniSONE tablet 5 mg; Take 1 tablet (5 mg total) by mouth once daily.  -     sodium bicarbonate tablet 2,600 mg; Take 4 tablets (2,600 mg total) by mouth 2 (two) times daily.  -     tacrolimus capsule 1.5 mg; Take 1.5 mg by mouth 0800, 1800.  -     0.9%  NaCl infusion; Inject into the vein continuous.  -     insulin detemir U-100 pen 10 Units; Inject 10 Units into the skin every evening.  -     budesonide nebulizer solution 0.5 mg; Take 2 mLs (0.5 mg total) by nebulization 2 (two) times daily.  -     Inhalation Treatment Daily; Standing  -     Cancel: Inhalation Treatment Q12H; Standing  -     Discontinue: formoterol nebulizer solution 20 mcg; Take 2 mLs (20 mcg total) by nebulization every 12 (twelve) hours.  -     glucose chewable tablet 16 g; Take 4 tablets (16 g total) by mouth as needed for Blood Glucose less than (70 mg/dL X 1 dose for patients who can take PO.).  -     glucose chewable tablet 24 g; Take 6 tablets (24 g total) by mouth as needed for Blood Glucose less than (50 mg/dL X 1 dose for patients who can take PO.).  -     dextrose 50% injection 12.5 g; Inject 25 mLs (12.5 g total) into the vein as needed for For blood glucose (between 51 - 70 mg/dL if patient cannnot  take PO but has IV access.).  -     dextrose 50% injection 25 g; Inject 50 mLs (25 g total) into the vein as needed for For blood glucose (less than 50 mg/dL if patient cannnot take PO but has IV access.).  -     glucagon (human recombinant) injection 1 mg; Inject 1 mg into the muscle as needed for Blood Glucose less than (70 mg/dL if patient is unconscious or obtunded and DOES NOT HAVE IV ACCESS.).  -     Recheck Blood Glucose:; Standing  -     Cancel: POCT glucose; Standing  -     If any glucose result is less than 50 or greater than 400:; Standing  -     If 2nd result is less than 50 or greater than 400:; Standing  -     Do not admin Aspart correction between scheduled prandial Aspart; Standing  -     insulin aspart U-100 pen 1-10 Units; Inject 1-10 Units into the skin before meals and at bedtime as needed for For blood glucose (Hyperglycemia).  -     Neurovascular checks; Standing  -     Inpatient consult to Nephrology; Standing  -     arformoterol nebulizer solution 15 mcg; Take 2 mLs (15 mcg total) by nebulization 2 (two) times daily.  -     Inhalation Treatment BID; Standing  -     POCT glucose; Standing  -     Discontinue: vancomycin in dextrose 5 % 1 gram/250 mL IVPB 1,000 mg; Inject 250 mLs (1,000 mg total) into the vein every 48 hours.  -     Cancel: Vancomycin, random; Standing  -     Cancel: Hemoglobin A1c; Standing  -     IP consult to case management; Standing  -     CBC auto differential; Standing  -     Cancel: Comprehensive metabolic panel; Standing  -     Hemoglobin A1c; Standing  -     Vancomycin, random; Standing  -     POCT glucose; Standing  -     POCT glucose; Standing  -     vancomycin (VANCOCIN) 1,250 mg in dextrose 5 % 250 mL IVPB; Inject 1,250 mg into the vein ED 1 Time.  -     Cancel: Diet NPO Except for: Sips with Medication; Standing  -     piperacillin-tazobactam 4.5 g in dextrose 5 % 100 mL IVPB (ready to mix system); Inject 100 mLs (4.5 g total) into the vein every 8 (eight)  hours.  -     vancomycin in dextrose 5 % 1 gram/250 mL IVPB 1,000 mg; Inject 250 mLs (1,000 mg total) into the vein every 48 hours.  -     Vancomycin, random; Standing  -     POCT glucose; Standing  -     Diet diabetic [facility]; 1500 Calorie; Standing  -     silver sulfADIAZINE 1% cream; Apply topically once daily.  -     Change dressing; Standing  -     Tacrolimus level; Standing  -     Renal function panel; Standing  -     Urinalysis; Standing  -     Place sequential compression device; Standing  -     Urinalysis Microscopic; Standing  -     POCT glucose; Standing  -     US Lower Extremity Arteries Left; Standing  -     US Lower Extrem Arteries Bilat with LYNN (xpd); Standing      I counseled the patient on his conditions, regarding findings of my examination, my impressions, and usual treatment plan.   Reviewed labs, x-rays, and arterial studies results at bedside with patient.   Discussed with patient in detail the treatment options for dry gangrene, including (1) local wound care, or (2) surgical excision(amputation) of dry gangrene. Risk for treatments including further limb loss, delayed healing, non-healing was also discussed. Patient states he understands both treatment options and would like to try local wound care pending the vascular evaluation. Patient states he also understands that he may still need amputation if local wound care does not work.   Ordered Silvadene to be applied to left hallux once daily by nursing.   Vascular consult pending.   Home health to be ordered upon discharge.   Referral to Hyperbarics to be completed.   Patient to be scheduled with myself at the [facility] clinic upon discharge.                   Katerina Magaña DPM  [facility]

## 2018-07-08 NOTE — PROGRESS NOTES
[facility] -   Nephrology  Progress Note    Patient Name: Lavelle Ladd  MRN: [id number]  Admission Date: 2018  Hospital Length of Stay: 2 days  Attending Provider: Gabi Mathias MD   Primary Care Physician: Rishabh Esteban MD  Principal Problem:Gangrene of toe of left foot    Subjective:     HPI: Lavelle Ladd is a pleasant 62 y old  man  who received a  - ( brain death ) kidney transplant on 16.  He has CKD stage 3 - GFR 30-59 and his baseline creatinine is between 1.6-1.8. He takes  prednisone and tacrolimus for maintenance immunosuppression.  MMF was stopped few months ago , s/p right BKA in  , has dry gangrene on his left great toe for few days, now developed purulent discharge, admitted for management,  Podiatry consulted , renal fn at his baseline , several admissions in the past few months for same reason ,     Interval History:  No acute events ,     Review of patient's allergies indicates: No Known Allergies      Current Facility-Administered Medications   Medication Frequency    0.9%  NaCl infusion Continuous    amLODIPine tablet 2.5 mg Daily    arformoterol nebulizer solution 15 mcg BID    aspirin EC tablet 81 mg Daily    budesonide nebulizer solution 0.5 mg BID    dextrose 50% injection 12.5 g PRN    dextrose 50% injection 25 g PRN    glucagon (human recombinant) injection 1 mg PRN    glucose chewable tablet 16 g PRN    glucose chewable tablet 24 g PRN    insulin aspart U-100 pen 1-10 Units QID (AC + HS) PRN    insulin detemir U-100 pen 10 Units QHS    morphine injection 2 mg Q4H PRN    morphine injection 4 mg Q4H PRN    ondansetron injection 4 mg Q8H PRN    piperacillin-tazobactam 4.5 g in dextrose 5 % 100 mL IVPB (ready to mix system) Q8H    predniSONE tablet 5 mg Daily    silver sulfADIAZINE 1% cream Daily    sodium bicarbonate tablet 2,600 mg BID    tacrolimus capsule 1.5 mg BID    vancomycin (VANCOCIN) 1,250 mg in dextrose 5 %  250 mL IVPB Q24H       Objective:     Vital Signs (Most Recent):  Temp: 97.9 °F (36.6 °C) (07/08/18 1152)  Pulse: 71 (07/08/18 1152)  Resp: 18 (07/08/18 1152)  BP: (!) 168/75 (07/08/18 1152)  SpO2: (!) 93 % (07/08/18 1152)  O2 Device (Oxygen Therapy): room air (07/08/18 0800) Vital Signs (24h Range):  Temp:  [96.7 °F (35.9 °C)-97.9 °F (36.6 °C)] 97.9 °F (36.6 °C)  Pulse:  [68-86] 71  Resp:  [16-20] 18  SpO2:  [93 %-97 %] 93 %  BP: (114-168)/(61-84) 168/75     Weight: 101.4 kg (223 lb 8.7 oz) (07/06/18 1357)  Body mass index is 31.18 kg/m².  Body surface area is 2.25 meters squared.    I/O last 3 completed shifts:  In: 3385.4 [P.O.:360; I.V.:2125.4; IV Piggyback:900]  Out: 1975 [Urine:1975]    Physical Exam   Constitutional: He is oriented to person, place, and time. He appears well-developed and well-nourished. No distress.   HENT:   Head: Normocephalic and atraumatic.   Eyes: Pupils are equal, round, and reactive to light.   Neck: Normal range of motion. Neck supple. No tracheal deviation present. No thyromegaly present.   Cardiovascular: Normal rate, regular rhythm, normal heart sounds and intact distal pulses.  Exam reveals no gallop and no friction rub.    No murmur heard.  Pulmonary/Chest: Effort normal and breath sounds normal. He has no wheezes. He has no rales.   Abdominal: Soft. He exhibits no mass. There is no tenderness. There is no rebound and no guarding.   No allograft tenderness    Musculoskeletal: Normal range of motion. He exhibits no edema.   S/p right BKA , left great toe with partial gangrene   Lymphadenopathy:     He has no cervical adenopathy.   Neurological: He is alert and oriented to person, place, and time.   Skin: Skin is warm. No rash noted. He is not diaphoretic. No erythema.   Nursing note and vitals reviewed.      Significant Labs:    CBC:   Recent Labs  Lab 07/08/18  0429   WBC 5.02   RBC 4.77   HGB 14.7   HCT 44.5      MCV 93   MCH 30.8   MCHC 33.0     CMP:   Recent Labs  Lab  07/07/18  0432 07/08/18  0429   * 139*   CALCIUM 9.4 9.3   ALBUMIN 3.3* 3.2*   PROT 6.7  --     137   K 4.1 4.3   CO2 22* 22*    104   BUN 22 22   CREATININE 1.7* 1.8*   ALKPHOS 77  --    ALT 13  --    AST 10  --    BILITOT 0.6  --      Coagulation:   Recent Labs  Lab 07/06/18  1521   INR 0.9   APTT 23.9     LFTs:   Recent Labs  Lab 07/07/18  0432 07/08/18  0429   ALT 13  --    AST 10  --    ALKPHOS 77  --    BILITOT 0.6  --    PROT 6.7  --    ALBUMIN 3.3* 3.2*     All labs within the past 24 hours have been reviewed.     Lab Results   Component Value Date    .0 (H) 10/30/2017    CALCIUM 9.3 07/08/2018    CAION 1.10 05/30/2016    PHOS 2.6 (L) 07/08/2018           Significant Imaging: reviewed     Assessment/Plan:     Kidney transplant recipient      1. S/p DDRT in 2016 : stable allograft fn , Cr at baseline at 1.8 mg/dl,     cont Prograf 1.5 mg bid and Pred 5 mg daily , follow Tac level ,      2. HTN : on amlodipine ,      3. Left toe gangrene,  Podiatry and vascular following , on broad spectrum abx , follow Vanc levels closely,      4. Metabolic acidosis : cont Bicarb supplements      5. Anemia of CKD : stable Hb                  I will follow-up with patient. Please contact us if you have any additional questions.     Total time spent 40 minutes including time needed to review the records,  patient  evaluation, documentation, face-to-face discussion with the patient,  primary team,   more than 50% of the time was spent on coordination of care and counseling.       Carlos Staley MD  Nephrology  [facility] -

## 2018-07-08 NOTE — ASSESSMENT & PLAN NOTE
- Admitted to Medicine  - Podiatry consulted from the ER; per Dr. Magaña will likely need amputation  - Evaluated by vascular surgeon Dr. Uribe.  Per vascular, left foot arterial blood flow is adequate.  Ok for podiatry to proceed with OR for aggressive local debridement/amputation.  Reconsult if any further assistance is required.   - Will d/w Dr. Magaña to determine POC moving forward  - Continue empiric ABX with Vanco and Zosyn  - Local wound care per podiatry recs  - Neurovascular checks  - BC show NGTD  - Daily labs  - Monitor

## 2018-07-08 NOTE — PROGRESS NOTES
Clinical Pharmacy Progress Note: Vancomycin Dosing     Vancomycin Day #3    Estimated Creatinine Clearance: 50.3 mL/min (A) (based on SCr of 1.8 mg/dL (H)).   SCr trend: (increased x 1 day)   Lab Results   Component Value Date    WBC 5.02 07/08/2018   WBC trend: (stable)         Tmax/24h: 97.9  Level:   Vancomycin, random [[id number]] Collected: 07/08/18 0429   Specimen: Blood from Blood Updated: 07/08/18 0604    Vancomycin, Random 13.2 ug/mL    Goal trough: 15-20 mcg/mL   Indication: left foot gangrene, likely will require amputation per Podiatry      PMH of T2DM, kidney transplant on immunosuppressive therapy, right AKA, HTN, CKD, hx of ESBL infection, Hep C    Cultures: Blood on 7/6- NGTD   Plan: Pharmacy will change vancomycin dose to 1250 mg IV every 24 hours and draw a trough prior to the 3rd dose.   Next level due: Vancomycin trough due 07/09/18 @ 0700.     Thank you for allowing us to participate in this patient's care.    Radha Lopez, PharmD 7/8/2018 6:36 AM

## 2018-07-08 NOTE — ASSESSMENT & PLAN NOTE
-S/P angiogram performed by Dr. Mcdonald in early June to left leg (tibial and posterior tibial artery atherectomy with a balloon angioplasty and a left SFA atherectomy with balloon angioplasty).   - Vascular evaluated; left foot arterial blood flow is adequate.   - Continue ASA

## 2018-07-08 NOTE — SUBJECTIVE & OBJECTIVE
Interval History:  No acute events ,     Review of patient's allergies indicates: No Known Allergies      Current Facility-Administered Medications   Medication Frequency    0.9%  NaCl infusion Continuous    amLODIPine tablet 2.5 mg Daily    arformoterol nebulizer solution 15 mcg BID    aspirin EC tablet 81 mg Daily    budesonide nebulizer solution 0.5 mg BID    dextrose 50% injection 12.5 g PRN    dextrose 50% injection 25 g PRN    glucagon (human recombinant) injection 1 mg PRN    glucose chewable tablet 16 g PRN    glucose chewable tablet 24 g PRN    insulin aspart U-100 pen 1-10 Units QID (AC + HS) PRN    insulin detemir U-100 pen 10 Units QHS    morphine injection 2 mg Q4H PRN    morphine injection 4 mg Q4H PRN    ondansetron injection 4 mg Q8H PRN    piperacillin-tazobactam 4.5 g in dextrose 5 % 100 mL IVPB (ready to mix system) Q8H    predniSONE tablet 5 mg Daily    silver sulfADIAZINE 1% cream Daily    sodium bicarbonate tablet 2,600 mg BID    tacrolimus capsule 1.5 mg BID    vancomycin (VANCOCIN) 1,250 mg in dextrose 5 % 250 mL IVPB Q24H       Objective:     Vital Signs (Most Recent):  Temp: 97.9 °F (36.6 °C) (07/08/18 1152)  Pulse: 71 (07/08/18 1152)  Resp: 18 (07/08/18 1152)  BP: (!) 168/75 (07/08/18 1152)  SpO2: (!) 93 % (07/08/18 1152)  O2 Device (Oxygen Therapy): room air (07/08/18 0800) Vital Signs (24h Range):  Temp:  [96.7 °F (35.9 °C)-97.9 °F (36.6 °C)] 97.9 °F (36.6 °C)  Pulse:  [68-86] 71  Resp:  [16-20] 18  SpO2:  [93 %-97 %] 93 %  BP: (114-168)/(61-84) 168/75     Weight: 101.4 kg (223 lb 8.7 oz) (07/06/18 1357)  Body mass index is 31.18 kg/m².  Body surface area is 2.25 meters squared.    I/O last 3 completed shifts:  In: 3385.4 [P.O.:360; I.V.:2125.4; IV Piggyback:900]  Out: 1975 [Urine:1975]    Physical Exam   Constitutional: He is oriented to person, place, and time. He appears well-developed and well-nourished. No distress.   HENT:   Head: Normocephalic and atraumatic.    Eyes: Pupils are equal, round, and reactive to light.   Neck: Normal range of motion. Neck supple. No tracheal deviation present. No thyromegaly present.   Cardiovascular: Normal rate, regular rhythm, normal heart sounds and intact distal pulses.  Exam reveals no gallop and no friction rub.    No murmur heard.  Pulmonary/Chest: Effort normal and breath sounds normal. He has no wheezes. He has no rales.   Abdominal: Soft. He exhibits no mass. There is no tenderness. There is no rebound and no guarding.   No allograft tenderness    Musculoskeletal: Normal range of motion. He exhibits no edema.   S/p right BKA , left great toe with partial gangrene   Lymphadenopathy:     He has no cervical adenopathy.   Neurological: He is alert and oriented to person, place, and time.   Skin: Skin is warm. No rash noted. He is not diaphoretic. No erythema.   Nursing note and vitals reviewed.      Significant Labs:    CBC:   Recent Labs  Lab 07/08/18 0429   WBC 5.02   RBC 4.77   HGB 14.7   HCT 44.5      MCV 93   MCH 30.8   MCHC 33.0     CMP:   Recent Labs  Lab 07/07/18  0432 07/08/18  0429   * 139*   CALCIUM 9.4 9.3   ALBUMIN 3.3* 3.2*   PROT 6.7  --     137   K 4.1 4.3   CO2 22* 22*    104   BUN 22 22   CREATININE 1.7* 1.8*   ALKPHOS 77  --    ALT 13  --    AST 10  --    BILITOT 0.6  --      Coagulation:   Recent Labs  Lab 07/06/18  1521   INR 0.9   APTT 23.9     LFTs:   Recent Labs  Lab 07/07/18  0432 07/08/18  0429   ALT 13  --    AST 10  --    ALKPHOS 77  --    BILITOT 0.6  --    PROT 6.7  --    ALBUMIN 3.3* 3.2*     All labs within the past 24 hours have been reviewed.     Lab Results   Component Value Date    .0 (H) 10/30/2017    CALCIUM 9.3 07/08/2018    CAION 1.10 05/30/2016    PHOS 2.6 (L) 07/08/2018           Significant Imaging: reviewed

## 2018-07-08 NOTE — ASSESSMENT & PLAN NOTE
- Nephrology consulted  - Continue Prograf and Prednisone  - Monitor Prograf levels; currently 5.6

## 2018-07-08 NOTE — SUBJECTIVE & OBJECTIVE
Interval History:  No acute events overnight.  Resting comfortably in bed.  Evaluated by vascular surgeon Dr. Uribe.  Per vascular, left foot arterial blood flow is adequate.  Ok for podiatry to proceed with OR for aggressive local debridement/amputation.  Reconsult if any further assistance is required.  Will d/w Dr. Magaña to determine POC moving forward.  Continue local care per podiatry recs for now.       Review of Systems   Constitutional: Negative for activity change, appetite change, chills, fatigue and fever.   HENT: Negative.  Negative for congestion.    Eyes: Negative for pain, redness and visual disturbance.   Respiratory: Negative for cough, shortness of breath and wheezing.    Cardiovascular: Positive for leg swelling. Negative for chest pain and palpitations.   Gastrointestinal: Negative for abdominal pain, constipation, diarrhea, nausea and vomiting.   Genitourinary: Negative.    Musculoskeletal: Positive for back pain and gait problem.   Skin: Positive for color change and wound.   Neurological: Positive for numbness. Negative for dizziness and light-headedness.        + numbness to bilateral feet   Psychiatric/Behavioral: Negative for agitation and confusion. The patient is not nervous/anxious.      Objective:     Vital Signs (Most Recent):  Temp: 97.8 °F (36.6 °C) (07/08/18 0800)  Pulse: 72 (07/08/18 0800)  Resp: 16 (07/08/18 0800)  BP: 114/80 (07/08/18 0800)  SpO2: (!) 94 % (07/08/18 0800) Vital Signs (24h Range):  Temp:  [96.7 °F (35.9 °C)-97.9 °F (36.6 °C)] 97.8 °F (36.6 °C)  Pulse:  [68-86] 72  Resp:  [16-20] 16  SpO2:  [94 %-97 %] 94 %  BP: (114-139)/(61-84) 114/80     Weight: 101.4 kg (223 lb 8.7 oz)  Body mass index is 31.18 kg/m².    Intake/Output Summary (Last 24 hours) at 07/08/18 1051  Last data filed at 07/08/18 0500   Gross per 24 hour   Intake          2336.66 ml   Output              825 ml   Net          1511.66 ml      Physical Exam   Constitutional: He is oriented to person,  place, and time. He appears well-developed and well-nourished. No distress.   HENT:   Head: Normocephalic and atraumatic.   Eyes: Conjunctivae and EOM are normal. Right eye exhibits no discharge.   Neck: Normal range of motion. Neck supple. No tracheal deviation present. No thyromegaly present.   Cardiovascular: Normal rate, regular rhythm and normal heart sounds.  Exam reveals no gallop and no friction rub.    No murmur heard.  Left PT and DP pulses audible per doppler   Pulmonary/Chest: Effort normal and breath sounds normal. He has no wheezes. He has no rales.   Abdominal: Soft. Bowel sounds are normal. He exhibits no mass. There is no tenderness. There is no rebound and no guarding.   Musculoskeletal: He exhibits edema.   Left toe gangrene , s/p right BKA, mild edema to left foot   Lymphadenopathy:     He has no cervical adenopathy.   Neurological: He is alert and oriented to person, place, and time.   Skin: Skin is warm. No rash noted. He is not diaphoretic. There is erythema.   Eschar to distal left great toe, nail is removed from nailbed, mild erythema present to the toe   Psychiatric: He has a normal mood and affect. His behavior is normal.   Nursing note and vitals reviewed.      Significant Labs:   Blood Culture:   Recent Labs  Lab 07/06/18  1800 07/06/18  1837   LABBLOO No Growth to date  No Growth to date No Growth to date  No Growth to date     BMP:   Recent Labs  Lab 07/08/18  0429   *      K 4.3      CO2 22*   BUN 22   CREATININE 1.8*   CALCIUM 9.3     CBC:   Recent Labs  Lab 07/06/18  1521 07/07/18  0432 07/08/18  0429   WBC 5.62 5.43 5.02   HGB 15.6 15.3 14.7   HCT 45.6 45.2 44.5    190 187       Significant Imaging: I have reviewed all pertinent imaging results/findings within the past 24 hours.

## 2018-07-09 ENCOUNTER — ANESTHESIA (OUTPATIENT)
Dept: SURGERY | Facility: HOSPITAL | Age: 63
DRG: 264 | End: 2018-07-09
Payer: MEDICARE

## 2018-07-09 ENCOUNTER — ANESTHESIA EVENT (OUTPATIENT)
Dept: SURGERY | Facility: HOSPITAL | Age: 63
DRG: 264 | End: 2018-07-09
Payer: MEDICARE

## 2018-07-09 LAB
ALBUMIN SERPL BCP-MCNC: 3.5 G/DL
ANION GAP SERPL CALC-SCNC: 12 MMOL/L
BASOPHILS # BLD AUTO: 0.02 K/UL
BASOPHILS NFR BLD: 0.4 %
BUN SERPL-MCNC: 21 MG/DL
CALCIUM SERPL-MCNC: 10.5 MG/DL
CHLORIDE SERPL-SCNC: 104 MMOL/L
CO2 SERPL-SCNC: 22 MMOL/L
CREAT SERPL-MCNC: 1.9 MG/DL
DIFFERENTIAL METHOD: ABNORMAL
EOSINOPHIL # BLD AUTO: 0.1 K/UL
EOSINOPHIL NFR BLD: 2.3 %
ERYTHROCYTE [DISTWIDTH] IN BLOOD BY AUTOMATED COUNT: 13.2 %
EST. GFR  (AFRICAN AMERICAN): 42 ML/MIN/1.73 M^2
EST. GFR  (NON AFRICAN AMERICAN): 36 ML/MIN/1.73 M^2
GLUCOSE SERPL-MCNC: 184 MG/DL
HCT VFR BLD AUTO: 45.6 %
HGB BLD-MCNC: 15.5 G/DL
LYMPHOCYTES # BLD AUTO: 1.9 K/UL
LYMPHOCYTES NFR BLD: 33.3 %
MCH RBC QN AUTO: 31.4 PG
MCHC RBC AUTO-ENTMCNC: 34 G/DL
MCV RBC AUTO: 93 FL
MONOCYTES # BLD AUTO: 0.6 K/UL
MONOCYTES NFR BLD: 10.6 %
NEUTROPHILS # BLD AUTO: 3 K/UL
NEUTROPHILS NFR BLD: 53.4 %
PHOSPHATE SERPL-MCNC: 2.8 MG/DL
PLATELET # BLD AUTO: 211 K/UL
PMV BLD AUTO: 10.2 FL
POCT GLUCOSE: 143 MG/DL (ref 70–110)
POCT GLUCOSE: 172 MG/DL (ref 70–110)
POCT GLUCOSE: 259 MG/DL (ref 70–110)
POCT GLUCOSE: 260 MG/DL (ref 70–110)
POCT GLUCOSE: 285 MG/DL (ref 70–110)
POTASSIUM SERPL-SCNC: 3.9 MMOL/L
RBC # BLD AUTO: 4.93 M/UL
SODIUM SERPL-SCNC: 138 MMOL/L
VANCOMYCIN TROUGH SERPL-MCNC: 14.7 UG/ML
WBC # BLD AUTO: 5.58 K/UL

## 2018-07-09 PROCEDURE — 87075 CULTR BACTERIA EXCEPT BLOOD: CPT

## 2018-07-09 PROCEDURE — 25000003 PHARM REV CODE 250: Performed by: INTERNAL MEDICINE

## 2018-07-09 PROCEDURE — 11000001 HC ACUTE MED/SURG PRIVATE ROOM

## 2018-07-09 PROCEDURE — 63600175 PHARM REV CODE 636 W HCPCS: Performed by: NURSE PRACTITIONER

## 2018-07-09 PROCEDURE — 11042 DBRDMT SUBQ TIS 1ST 20SQCM/<: CPT | Mod: ,,, | Performed by: PODIATRIST

## 2018-07-09 PROCEDURE — 87070 CULTURE OTHR SPECIMN AEROBIC: CPT

## 2018-07-09 PROCEDURE — 25000242 PHARM REV CODE 250 ALT 637 W/ HCPCS: Performed by: EMERGENCY MEDICINE

## 2018-07-09 PROCEDURE — 63600175 PHARM REV CODE 636 W HCPCS: Performed by: INTERNAL MEDICINE

## 2018-07-09 PROCEDURE — 25000003 PHARM REV CODE 250: Performed by: PODIATRIST

## 2018-07-09 PROCEDURE — 25000242 PHARM REV CODE 250 ALT 637 W/ HCPCS: Performed by: NURSE PRACTITIONER

## 2018-07-09 PROCEDURE — 25000003 PHARM REV CODE 250: Performed by: NURSE ANESTHETIST, CERTIFIED REGISTERED

## 2018-07-09 PROCEDURE — 94760 N-INVAS EAR/PLS OXIMETRY 1: CPT

## 2018-07-09 PROCEDURE — 0JBR0ZZ EXCISION OF LEFT FOOT SUBCUTANEOUS TISSUE AND FASCIA, OPEN APPROACH: ICD-10-PCS | Performed by: PODIATRIST

## 2018-07-09 PROCEDURE — 80069 RENAL FUNCTION PANEL: CPT

## 2018-07-09 PROCEDURE — 85025 COMPLETE CBC W/AUTO DIFF WBC: CPT

## 2018-07-09 PROCEDURE — 87186 SC STD MICRODIL/AGAR DIL: CPT

## 2018-07-09 PROCEDURE — 96372 THER/PROPH/DIAG INJ SC/IM: CPT

## 2018-07-09 PROCEDURE — 94640 AIRWAY INHALATION TREATMENT: CPT

## 2018-07-09 PROCEDURE — 88311 DECALCIFY TISSUE: CPT | Mod: 26,,, | Performed by: PATHOLOGY

## 2018-07-09 PROCEDURE — S0020 INJECTION, BUPIVICAINE HYDRO: HCPCS | Performed by: PODIATRIST

## 2018-07-09 PROCEDURE — 71000033 HC RECOVERY, INTIAL HOUR: Performed by: PODIATRIST

## 2018-07-09 PROCEDURE — 37000008 HC ANESTHESIA 1ST 15 MINUTES: Performed by: PODIATRIST

## 2018-07-09 PROCEDURE — 37000009 HC ANESTHESIA EA ADD 15 MINS: Performed by: PODIATRIST

## 2018-07-09 PROCEDURE — 88305 TISSUE EXAM BY PATHOLOGIST: CPT | Mod: 26,,, | Performed by: PATHOLOGY

## 2018-07-09 PROCEDURE — 36000707: Performed by: PODIATRIST

## 2018-07-09 PROCEDURE — 36415 COLL VENOUS BLD VENIPUNCTURE: CPT

## 2018-07-09 PROCEDURE — 36000706: Performed by: PODIATRIST

## 2018-07-09 PROCEDURE — 25000003 PHARM REV CODE 250: Performed by: NURSE PRACTITIONER

## 2018-07-09 PROCEDURE — 94761 N-INVAS EAR/PLS OXIMETRY MLT: CPT

## 2018-07-09 PROCEDURE — 87077 CULTURE AEROBIC IDENTIFY: CPT

## 2018-07-09 PROCEDURE — 99900035 HC TECH TIME PER 15 MIN (STAT)

## 2018-07-09 PROCEDURE — 99233 SBSQ HOSP IP/OBS HIGH 50: CPT | Mod: ,,, | Performed by: INTERNAL MEDICINE

## 2018-07-09 PROCEDURE — 80202 ASSAY OF VANCOMYCIN: CPT

## 2018-07-09 PROCEDURE — 88311 DECALCIFY TISSUE: CPT | Performed by: PATHOLOGY

## 2018-07-09 PROCEDURE — 63600175 PHARM REV CODE 636 W HCPCS: Performed by: NURSE ANESTHETIST, CERTIFIED REGISTERED

## 2018-07-09 RX ORDER — HYDRALAZINE HYDROCHLORIDE 20 MG/ML
10 INJECTION INTRAMUSCULAR; INTRAVENOUS EVERY 6 HOURS PRN
Status: DISCONTINUED | OUTPATIENT
Start: 2018-07-09 | End: 2018-07-10 | Stop reason: HOSPADM

## 2018-07-09 RX ORDER — SODIUM CHLORIDE 0.9 % (FLUSH) 0.9 %
3 SYRINGE (ML) INJECTION EVERY 8 HOURS
Status: DISCONTINUED | OUTPATIENT
Start: 2018-07-09 | End: 2018-07-09

## 2018-07-09 RX ORDER — MEPERIDINE HYDROCHLORIDE 50 MG/ML
12.5 INJECTION INTRAMUSCULAR; INTRAVENOUS; SUBCUTANEOUS EVERY 10 MIN PRN
Status: DISCONTINUED | OUTPATIENT
Start: 2018-07-09 | End: 2018-07-09

## 2018-07-09 RX ORDER — PROPOFOL 10 MG/ML
VIAL (ML) INTRAVENOUS
Status: DISCONTINUED | OUTPATIENT
Start: 2018-07-09 | End: 2018-07-09

## 2018-07-09 RX ORDER — FENTANYL CITRATE 50 UG/ML
25 INJECTION, SOLUTION INTRAMUSCULAR; INTRAVENOUS EVERY 5 MIN PRN
Status: DISCONTINUED | OUTPATIENT
Start: 2018-07-09 | End: 2018-07-09

## 2018-07-09 RX ORDER — MIDAZOLAM HYDROCHLORIDE 1 MG/ML
INJECTION, SOLUTION INTRAMUSCULAR; INTRAVENOUS
Status: DISCONTINUED | OUTPATIENT
Start: 2018-07-09 | End: 2018-07-09

## 2018-07-09 RX ORDER — DIPHENHYDRAMINE HYDROCHLORIDE 50 MG/ML
12.5 INJECTION INTRAMUSCULAR; INTRAVENOUS EVERY 6 HOURS PRN
Status: DISCONTINUED | OUTPATIENT
Start: 2018-07-09 | End: 2018-07-09

## 2018-07-09 RX ORDER — LIDOCAINE HCL/PF 100 MG/5ML
SYRINGE (ML) INTRAVENOUS
Status: DISCONTINUED | OUTPATIENT
Start: 2018-07-09 | End: 2018-07-09

## 2018-07-09 RX ORDER — SODIUM CHLORIDE, SODIUM LACTATE, POTASSIUM CHLORIDE, CALCIUM CHLORIDE 600; 310; 30; 20 MG/100ML; MG/100ML; MG/100ML; MG/100ML
INJECTION, SOLUTION INTRAVENOUS CONTINUOUS PRN
Status: DISCONTINUED | OUTPATIENT
Start: 2018-07-09 | End: 2018-07-09

## 2018-07-09 RX ORDER — LIDOCAINE HYDROCHLORIDE 10 MG/ML
INJECTION, SOLUTION EPIDURAL; INFILTRATION; INTRACAUDAL; PERINEURAL
Status: DISCONTINUED | OUTPATIENT
Start: 2018-07-09 | End: 2018-07-09 | Stop reason: HOSPADM

## 2018-07-09 RX ORDER — OXYCODONE HYDROCHLORIDE 5 MG/1
5 TABLET ORAL
Status: DISCONTINUED | OUTPATIENT
Start: 2018-07-09 | End: 2018-07-09

## 2018-07-09 RX ORDER — SODIUM CHLORIDE 0.9 % (FLUSH) 0.9 %
3 SYRINGE (ML) INJECTION
Status: DISCONTINUED | OUTPATIENT
Start: 2018-07-09 | End: 2018-07-10 | Stop reason: HOSPADM

## 2018-07-09 RX ORDER — BUPIVACAINE HYDROCHLORIDE 5 MG/ML
INJECTION, SOLUTION EPIDURAL; INTRACAUDAL
Status: DISCONTINUED | OUTPATIENT
Start: 2018-07-09 | End: 2018-07-09 | Stop reason: HOSPADM

## 2018-07-09 RX ADMIN — MORPHINE SULFATE 2 MG: 4 INJECTION INTRAVENOUS at 01:07

## 2018-07-09 RX ADMIN — SODIUM CHLORIDE, SODIUM LACTATE, POTASSIUM CHLORIDE, AND CALCIUM CHLORIDE: 600; 310; 30; 20 INJECTION, SOLUTION INTRAVENOUS at 08:07

## 2018-07-09 RX ADMIN — MORPHINE SULFATE 2 MG: 4 INJECTION INTRAVENOUS at 11:07

## 2018-07-09 RX ADMIN — INSULIN ASPART 6 UNITS: 100 INJECTION, SOLUTION INTRAVENOUS; SUBCUTANEOUS at 04:07

## 2018-07-09 RX ADMIN — TACROLIMUS 1.5 MG: 1 CAPSULE ORAL at 10:07

## 2018-07-09 RX ADMIN — ASPIRIN 81 MG: 81 TABLET, COATED ORAL at 10:07

## 2018-07-09 RX ADMIN — PIPERACILLIN SODIUM AND TAZOBACTAM SODIUM 4.5 G: 4; .5 INJECTION, POWDER, LYOPHILIZED, FOR SOLUTION INTRAVENOUS at 11:07

## 2018-07-09 RX ADMIN — ARFORMOTEROL TARTRATE 15 MCG: 15 SOLUTION RESPIRATORY (INHALATION) at 07:07

## 2018-07-09 RX ADMIN — PREDNISONE 5 MG: 5 TABLET ORAL at 10:07

## 2018-07-09 RX ADMIN — BUDESONIDE 0.5 MG: 0.5 SUSPENSION RESPIRATORY (INHALATION) at 07:07

## 2018-07-09 RX ADMIN — INSULIN ASPART 6 UNITS: 100 INJECTION, SOLUTION INTRAVENOUS; SUBCUTANEOUS at 11:07

## 2018-07-09 RX ADMIN — HYDROCODONE BITARTRATE AND ACETAMINOPHEN 1 TABLET: 10; 325 TABLET ORAL at 01:07

## 2018-07-09 RX ADMIN — AMLODIPINE BESYLATE 2.5 MG: 2.5 TABLET ORAL at 10:07

## 2018-07-09 RX ADMIN — HYDROCODONE BITARTRATE AND ACETAMINOPHEN 1 TABLET: 10; 325 TABLET ORAL at 05:07

## 2018-07-09 RX ADMIN — HYDROCODONE BITARTRATE AND ACETAMINOPHEN 1 TABLET: 10; 325 TABLET ORAL at 09:07

## 2018-07-09 RX ADMIN — INSULIN DETEMIR 10 UNITS: 100 INJECTION, SOLUTION SUBCUTANEOUS at 11:07

## 2018-07-09 RX ADMIN — LIDOCAINE HYDROCHLORIDE 50 ML: 20 INJECTION, SOLUTION INTRAVENOUS at 08:07

## 2018-07-09 RX ADMIN — MORPHINE SULFATE 2 MG: 4 INJECTION INTRAVENOUS at 04:07

## 2018-07-09 RX ADMIN — MIDAZOLAM 2 MG: 1 INJECTION INTRAMUSCULAR; INTRAVENOUS at 08:07

## 2018-07-09 RX ADMIN — SODIUM BICARBONATE 2600 MG: 650 TABLET ORAL at 09:07

## 2018-07-09 RX ADMIN — TACROLIMUS 1.5 MG: 1 CAPSULE ORAL at 05:07

## 2018-07-09 RX ADMIN — VANCOMYCIN HYDROCHLORIDE 1250 MG: 10 INJECTION, POWDER, LYOPHILIZED, FOR SOLUTION INTRAVENOUS at 09:07

## 2018-07-09 RX ADMIN — PROPOFOL 50 MG: 10 INJECTION, EMULSION INTRAVENOUS at 08:07

## 2018-07-09 RX ADMIN — SODIUM BICARBONATE 2600 MG: 650 TABLET ORAL at 10:07

## 2018-07-09 RX ADMIN — INSULIN ASPART 3 UNITS: 100 INJECTION, SOLUTION INTRAVENOUS; SUBCUTANEOUS at 11:07

## 2018-07-09 RX ADMIN — PIPERACILLIN SODIUM AND TAZOBACTAM SODIUM 4.5 G: 4; .5 INJECTION, POWDER, LYOPHILIZED, FOR SOLUTION INTRAVENOUS at 05:07

## 2018-07-09 RX ADMIN — HYDRALAZINE HYDROCHLORIDE 10 MG: 20 INJECTION INTRAMUSCULAR; INTRAVENOUS at 03:07

## 2018-07-09 RX ADMIN — PIPERACILLIN SODIUM AND TAZOBACTAM SODIUM 4.5 G: 4; .5 INJECTION, POWDER, LYOPHILIZED, FOR SOLUTION INTRAVENOUS at 01:07

## 2018-07-09 NOTE — PROGRESS NOTES
[facility] -   Nephrology  Progress Note    Patient Name: Lavelle Ladd  MRN: [id number]  Admission Date: 2018  Hospital Length of Stay: 3 days  Attending Provider: Stephanie Mueller MD   Primary Care Physician: Rishabh Esteban MD  Principal Problem:Gangrene of toe of left foot    Subjective:     HPI: Lavelle Ladd is a pleasant 62 y old  man  who received a  - ( brain death ) kidney transplant on 16.  He has CKD stage 3 - GFR 30-59 and his baseline creatinine is between 1.6-1.8. He takes  prednisone and tacrolimus for maintenance immunosuppression.  MMF was stopped few months ago , s/p right BKA in  , has dry gangrene on his left great toe for few days, now developed purulent discharge, admitted for management,  Podiatry consulted , renal fn at his baseline , several admissions in the past few months for same reason ,     Interval History:  No acute events , s/p surgery for right toe gangrene,     Review of patient's allergies indicates: No Known Allergies      Current Facility-Administered Medications   Medication Frequency    0.9%  NaCl infusion Continuous    arformoterol nebulizer solution 15 mcg BID    aspirin EC tablet 81 mg Daily    budesonide nebulizer solution 0.5 mg BID    dextrose 50% injection 12.5 g PRN    dextrose 50% injection 25 g PRN    diphenhydrAMINE injection 25 mg Q6H PRN    glucagon (human recombinant) injection 1 mg PRN    glucose chewable tablet 16 g PRN    glucose chewable tablet 24 g PRN    hydrALAZINE injection 10 mg Q6H PRN    HYDROcodone-acetaminophen  mg per tablet 1 tablet Q6H PRN    HYDROcodone-acetaminophen 5-325 mg per tablet 1 tablet Q6H PRN    insulin aspart U-100 pen 1-10 Units QID (AC + HS) PRN    insulin detemir U-100 pen 10 Units QHS    morphine injection 2 mg Q4H PRN    nozaseptin (NOZIN) nasal  BID    ondansetron injection 4 mg Q8H PRN    piperacillin-tazobactam 4.5 g in dextrose 5 % 100 mL  IVPB (ready to mix system) Q8H    predniSONE tablet 5 mg Daily    sodium bicarbonate tablet 2,600 mg BID    sodium chloride 0.9% flush 3 mL PRN    tacrolimus capsule 1.5 mg BID    vancomycin (VANCOCIN) 1,250 mg in dextrose 5 % 250 mL IVPB Q48H       Objective:     Vital Signs (Most Recent):  Temp: 97.5 °F (36.4 °C) (07/09/18 1123)  Pulse: 93 (07/09/18 1123)  Resp: 18 (07/09/18 1123)  BP: (!) 108/59 (07/09/18 1123)  SpO2: 95 % (07/09/18 1123)  O2 Device (Oxygen Therapy): room air (07/09/18 1002) Vital Signs (24h Range):  Temp:  [97.5 °F (36.4 °C)-98 °F (36.7 °C)] 97.5 °F (36.4 °C)  Pulse:  [] 93  Resp:  [14-20] 18  SpO2:  [92 %-99 %] 95 %  BP: (105-230)/() 108/59     Weight: 101.4 kg (223 lb 8.7 oz) (07/06/18 1357)  Body mass index is 31.18 kg/m².  Body surface area is 2.25 meters squared.    I/O last 3 completed shifts:  In: 3210.8 [P.O.:720; I.V.:1840.8; IV Piggyback:650]  Out: 425 [Urine:425]    Physical Exam   Constitutional: He is oriented to person, place, and time. He appears well-developed and well-nourished. No distress.   HENT:   Head: Normocephalic and atraumatic.   Eyes: Pupils are equal, round, and reactive to light.   Neck: Normal range of motion. Neck supple. No tracheal deviation present. No thyromegaly present.   Cardiovascular: Normal rate, regular rhythm, normal heart sounds and intact distal pulses.  Exam reveals no gallop and no friction rub.    No murmur heard.  Pulmonary/Chest: Effort normal and breath sounds normal. He has no wheezes. He has no rales.   Abdominal: Soft. He exhibits no mass. There is no tenderness. There is no rebound and no guarding.   No allograft tenderness    Musculoskeletal: Normal range of motion. He exhibits no edema.   S/p right BKA , left great toe with partial gangrene   Lymphadenopathy:     He has no cervical adenopathy.   Neurological: He is alert and oriented to person, place, and time.   Skin: Skin is warm. No rash noted. He is not diaphoretic. No  erythema.   Nursing note and vitals reviewed.      Significant Labs:    CBC:     Recent Labs  Lab 07/09/18 0452   WBC 5.58   RBC 4.93   HGB 15.5   HCT 45.6      MCV 93   MCH 31.4*   MCHC 34.0     CMP:   Recent Labs  Lab 07/07/18 0432 07/09/18 0452   *  < > 184*   CALCIUM 9.4  < > 10.5   ALBUMIN 3.3*  < > 3.5   PROT 6.7  --   --      < > 138   K 4.1  < > 3.9   CO2 22*  < > 22*     < > 104   BUN 22  < > 21   CREATININE 1.7*  < > 1.9*   ALKPHOS 77  --   --    ALT 13  --   --    AST 10  --   --    BILITOT 0.6  --   --    < > = values in this interval not displayed.  Coagulation:     Recent Labs  Lab 07/06/18  1521   INR 0.9   APTT 23.9     LFTs:   Recent Labs  Lab 07/07/18 0432 07/09/18 0452   ALT 13  --   --    AST 10  --   --    ALKPHOS 77  --   --    BILITOT 0.6  --   --    PROT 6.7  --   --    ALBUMIN 3.3*  < > 3.5   < > = values in this interval not displayed.  All labs within the past 24 hours have been reviewed.     Lab Results   Component Value Date    .0 (H) 10/30/2017    CALCIUM 10.5 07/09/2018    CAION 1.10 05/30/2016    PHOS 2.8 07/09/2018           Significant Imaging: reviewed     Assessment/Plan:     Kidney transplant recipient      1. S/p DDRT in 2016 : stable allograft fn , Cr at baseline at 1.9 mg/dl,     cont Prograf 1.5 mg bid and Pred 5 mg daily , follow Tac level ,      2. HTN : BP on low side, will stop amlodipine and monitor      3. Left toe gangrene,  Podiatry and vascular following , on broad spectrum abx , follow Vanc levels closely,      4. Metabolic acidosis : cont Bicarb supplements      5. Anemia of CKD : stable Hb                  I will follow-up with patient. Please contact us if you have any additional questions.     Total time spent 40 minutes including time needed to review the records,  patient  evaluation, documentation, face-to-face discussion with the patient,  primary team   more than 50% of the time was spent on coordination of care and  counseling.       Carlos Staley MD  Nephrology  [facility] [place]

## 2018-07-09 NOTE — PLAN OF CARE
Problem: Patient Care Overview  Goal: Plan of Care Review  Outcome: Ongoing (interventions implemented as appropriate)  Remained free from injury. Sx this shift. Pain controlled with po and iv medication. Continuing iv abx. Tolerating diet. insulin given per sliding scale. 12 hour chart check complete.

## 2018-07-09 NOTE — PROGRESS NOTES
Vancomycin Progress Notes:    Dx: Toe Gangrene of left Foot  Hx:    Chronic kidney disease (CKD), stage III (moderate   Secondary to Diabetes Mellitus Type 2   Estimated Creatinine Clearance: 47.6 mL/min (A) (based on SCr of 1.9 mg/dL (H)).  Scr increased today from 1.8 yesterday  white blood cell count = 5.58   Tmax/24h = 97.9  Cultures -Blood-ngtd x 4 days/wound from left toe-collected 7/9/18  Trough today is 14.7-instructed nursing to give a dose today.  Changed current dosing to pulse dosing since increase in scr and considering patient CKD 3 stage history with kidney transplant on 5/21/16(normal baseline is 1.6-1.8) per Dr. Staley's notes.  Pulse dosing with 1250 mg iv q48hrs as a place torres dose only and will give a dose when randoms are < 18.   Random ordered for 7/10 @ 0700  Mari Amaya [facility] 7/9/2018 11:14 AM

## 2018-07-09 NOTE — ASSESSMENT & PLAN NOTE
- Cr stable at 1.9  - Baseline Cr 1.6 - 1.8  - Nephrology consulted as patient is renal transplant recipient taking Prograf and prednisone

## 2018-07-09 NOTE — PLAN OF CARE
CM met with the patient post surg debridement. He stated he stays alone but has assistance of his sister if needed. He stated he has no needs at this time

## 2018-07-09 NOTE — PLAN OF CARE
Problem: Patient Care Overview  Goal: Plan of Care Review  Outcome: Ongoing (interventions implemented as appropriate)  Fall prevention precautions maintained, pt remained free of falls throughout shift, call bell and personal items within reach, pt's pain moderately controlled by prn pain medication. 24 hour chart check completed. Will continue to monitor

## 2018-07-09 NOTE — PROGRESS NOTES
Subjective:     Patient ID: Lavelle Ladd is a 62 y.o. male.    Chief Complaint: Foot Pain (left foot wound and infection)    Lavelle is a 62 y.o. male who presents to the OR for evaluation and treatment of high risk feet. Lavelle has a past medical history of DINORAH (acute kidney injury) (2016); Arthritis; Chronic obstructive pulmonary disease (2016); Coronary artery disease involving native coronary artery of native heart without angina pectoris (2016);  donor kidney transplant for DM 16; Diastolic heart failure; Encounter for blood transfusion; ESRD on RRT since 10/2013 (10/29/2013); GERD (gastroesophageal reflux disease); History of hepatitis C, s/p successful Rx w/ SVR12 - 2017 (2017); Hyperlipidemia; Hypertension; Prophylactic immunotherapy; Proteinuria; PVD (peripheral vascular disease) (2017); Renal hypertension; Type 2 diabetes mellitus with diabetic neuropathy, with long-term current use of insulin (2016); and Vitamin B12 deficiency. The patient's chief complaint is  for surgical management of left big toe wound.       Hemoglobin A1C   Date Value Ref Range Status   2018 10.2 (H) 4.0 - 5.6 % Final     Comment:     ADA Screening Guidelines:  5.7-6.4%  Consistent with prediabetes  >or=6.5%  Consistent with diabetes  High levels of fetal hemoglobin interfere with the HbA1C  assay. Heterozygous hemoglobin variants (HbS, HgC, etc)do  not significantly interfere with this assay.   However, presence of multiple variants may affect accuracy.     2018 9.9 (H) 4.0 - 5.6 % Final     Comment:     ADA Screening Guidelines:  5.7-6.4%  Consistent with prediabetes  >or=6.5%  Consistent with diabetes  High levels of fetal hemoglobin interfere with the HbA1C  assay. Heterozygous hemoglobin variants (HbS, HgC, etc)do  not significantly interfere with this assay.   However, presence of multiple variants may affect accuracy.     2017 7.1 (H) 4.0 - 5.6 % Final      Comment:     According to ADA guidelines, hemoglobin A1c <7.0% represents  optimal control in non-pregnant diabetic patients. Different  metrics may apply to specific patient populations.   Standards of Medical Care in Diabetes-2016.  For the purpose of screening for the presence of diabetes:  <5.7%     Consistent with the absence of diabetes  5.7-6.4%  Consistent with increasing risk for diabetes   (prediabetes)  >or=6.5%  Consistent with diabetes  Currently, no consensus exists for use of hemoglobin A1c  for diagnosis of diabetes for children.  This Hemoglobin A1c assay has significant interference with fetal   hemoglobin   (HbF). The results are invalid for patients with abnormal amounts of   HbF,   including those with known Hereditary Persistence   of Fetal Hemoglobin. Heterozygous hemoglobin variants (HbAS, HbAC,   HbAD, HbAE, HbA2) do not significantly interfere with this assay;   however, presence of multiple variants in a sample may impact the %   interference.             Patient Active Problem List   Diagnosis    Renal hypertension    GERD (gastroesophageal reflux disease)    Peptic ulcer disease    Malignant HTN with heart disease, w/o CHF, with chronic kidney disease    Acidosis    Essential hypertension    Acute diastolic heart failure    Gastroparesis     donor kidney transplant for DM 16    Prophylactic immunotherapy    Adverse effect of glucocorticoids and synthetic analogues, sequela    Osteoarthritis of lumbar spine    Osteoarthritis of thoracic spine with myelopathy    Type 2 diabetes mellitus with hyperglycemia, with long-term current use of insulin    Coronary artery disease involving native coronary artery of native heart without angina pectoris    Hyperlipidemia    Chronic obstructive pulmonary disease    Ulnar neuropathy    Chronic kidney disease (CKD), stage III (moderate)    Reactive airway disease    Kidney transplant rejection    Type 2 diabetes  "mellitus with retinopathy, with long-term current use of insulin    Type 2 diabetes mellitus with diabetic neuropathy, with long-term current use of insulin    H/O amputation    Cavitary lesion of lung    Opacity of lung on imaging study    Bacteremia due to Gram-negative bacteria    ESBL (extended spectrum beta-lactamase) producing bacteria infection    History of hepatitis C, s/p successful Rx w/ SVR12 - 4/2017    Kidney transplant recipient    Intractable vomiting with nausea    PVD (peripheral vascular disease)    Chronic bilateral low back pain with left-sided sciatica    Diabetic polyneuropathy    Other chest pain    Disc disease, degenerative, lumbar or lumbosacral    Numbness and tingling    Adjustment insomnia    Lumbar stenosis with neurogenic claudication    Open wound of left great toe    Gangrene    Acute lumbar radiculopathy    Chronic lumbar radiculopathy    Gangrene of toe of left foot    Long term current use of immunosuppressive drug    Peripheral vascular disease    Diabetic foot infection       Current Discharge Medication List      CONTINUE these medications which have NOT CHANGED    Details   amLODIPine (NORVASC) 2.5 MG tablet Take 1 tablet (2.5 mg total) by mouth once daily.  Qty: 30 tablet, Refills: 11      aspirin (ECOTRIN) 81 MG EC tablet Take 1 tablet (81 mg total) by mouth once daily.  Refills: 0      BD INSULIN PEN NEEDLE UF SHORT 31 gauge x 5/16" Ndle       BD INSULIN SYRINGE ULTRA-FINE 1 mL 31 gauge x 5/16 Syrg USE ONE AS DIRECTED FOUR TIMES DAILY WITH MEALS AND AT BEDTIME  Qty: 120 each, Refills: 10      blood sugar diagnostic Strp 1 each by Misc.(Non-Drug; Combo Route) route 3 (three) times daily.  Qty: 100 each, Refills: 11      blood-glucose meter kit Use as instructed  Qty: 1 each, Refills: 0      ergocalciferol (ERGOCALCIFEROL) 50,000 unit Cap Take 1 capsule (50,000 Units total) by mouth every 7 days. Take on Mondays  Qty: 4 capsule, Refills: 11    " "  insulin NPH (NOVOLIN N) 100 unit/mL injection Take 25 units subq with breakfast and 15 units at bedtime  Qty: 4 vial, Refills: 0    Associated Diagnoses: Type 2 diabetes mellitus with hyperglycemia, with long-term current use of insulin      lancets Misc 1 each by Misc.(Non-Drug; Combo Route) route 3 (three) times daily.  Qty: 100 each, Refills: 11      NOVOLIN R REGULAR U-100 INSULN 100 unit/mL injection INJECT 6 UNITS WITH BREAKFAST AND 8 UNITS WITH DINNER, SUBCUTANEOUSLY 30 MIN BEFORE MEAL.  Qty: 10 mL, Refills: 1    Associated Diagnoses: Type 2 diabetes mellitus with hyperglycemia, with long-term current use of insulin      pen needle, diabetic (EASY COMFORT PEN NEEDLES) 32 gauge x 5/32" Ndle Inject 1 each into the skin 3 (three) times daily.  Qty: 100 each, Refills: 11      pen needle, diabetic 31 gauge x 1/4" Ndle 1 each by Misc.(Non-Drug; Combo Route) route 4 (four) times daily.  Qty: 100 each, Refills: 0      predniSONE (DELTASONE) 5 MG tablet Take 1 tablet (5 mg total) by mouth once daily.  Qty: 30 tablet, Refills: 11      sodium bicarbonate 650 MG tablet Take 4 tablets (2,600 mg total) by mouth 2 (two) times daily.  Qty: 240 tablet, Refills: 11      tacrolimus (PROGRAF) 0.5 MG Cap Take 3 capsules (1.5 mg total) by mouth every 12 (twelve) hours. Z94.0 Kidney Transplant  Qty: 180 capsule, Refills: 11    Comments: KTx 5/21/16; Z94.0      zolpidem (AMBIEN) 5 MG Tab Take 2 tablets (10 mg total) by mouth nightly as needed.  Qty: 30 tablet, Refills: 1      budesonide-formoterol 160-4.5 mcg (SYMBICORT) 160-4.5 mcg/actuation HFAA Inhale 2 puffs into the lungs every 12 (twelve) hours. Wash out mouth after using  Qty: 1 Inhaler, Refills: 12    Associated Diagnoses: Asthma with COPD      inhalation device (BREATHERITE VALVED MDI CHAMBER) Use as directed for inhalation.  Qty: 1 Device, Refills: prn    Associated Diagnoses: Asthma with COPD      ondansetron (ZOFRAN-ODT) 4 MG TbDL Take 1 tablet (4 mg total) by mouth " every 8 (eight) hours as needed.  Qty: 21 tablet, Refills: 1             Review of patient's allergies indicates:  No Known Allergies    Past Surgical History:   Procedure Laterality Date    AORTOGRAPHY WITH SERIALOGRAPHY N/A 6/14/2018    Procedure: LEFT LEG ANGIOGRAM;  Surgeon: Donal Mcdonald MD;  Location: [facility] CATH LAB;  Service: Vascular;  Laterality: N/A;    av bovine graft      Left UE    AV FISTULA PLACEMENT      left UE    CARDIAC CATHETERIZATION  02/2015    KIDNEY TRANSPLANT  05/21/2016    LEG AMPUTATION THROUGH KNEE  2011    right LE, started as nail puncture leading to diabetic ulcer       Family History   Problem Relation Age of Onset    Cancer Father     Diabetes Father     Heart failure Father     Stroke Father     Heart failure Mother     Kidney disease Neg Hx        Social History     Social History    Marital status: Single     Spouse name: N/A    Number of children: N/A    Years of education: N/A     Occupational History    Disabled      Disabled     Social History Main Topics    Smoking status: Former Smoker     Packs/day: 1.00     Years: 40.00     Quit date: 1/11/2013    Smokeless tobacco: Never Used    Alcohol use 3.6 oz/week     6 Cans of beer per week      Comment: 6 beers/week    Drug use: No    Sexual activity: No     Other Topics Concern    Not on file     Social History Narrative    Lives alone. Retired from construction and various jobs, now retired. Would like to return to work PT to alleviate boredom.       Vitals:    07/09/18 0535 07/09/18 0704 07/09/18 0707 07/09/18 0730   BP: 126/76   105/69   Pulse: 93 91 91 100   Resp:  16 14 18   Temp:    97.5 °F (36.4 °C)   TempSrc:    Oral   SpO2:  95% 95% (!) 92%   Weight:       Height:           Hemoglobin A1C   Date Value Ref Range Status   07/07/2018 10.2 (H) 4.0 - 5.6 % Final     Comment:     ADA Screening Guidelines:  5.7-6.4%  Consistent with prediabetes  >or=6.5%  Consistent with diabetes  High levels of fetal  hemoglobin interfere with the HbA1C  assay. Heterozygous hemoglobin variants (HbS, HgC, etc)do  not significantly interfere with this assay.   However, presence of multiple variants may affect accuracy.     06/12/2018 9.9 (H) 4.0 - 5.6 % Final     Comment:     ADA Screening Guidelines:  5.7-6.4%  Consistent with prediabetes  >or=6.5%  Consistent with diabetes  High levels of fetal hemoglobin interfere with the HbA1C  assay. Heterozygous hemoglobin variants (HbS, HgC, etc)do  not significantly interfere with this assay.   However, presence of multiple variants may affect accuracy.     11/03/2017 7.1 (H) 4.0 - 5.6 % Final     Comment:     According to ADA guidelines, hemoglobin A1c <7.0% represents  optimal control in non-pregnant diabetic patients. Different  metrics may apply to specific patient populations.   Standards of Medical Care in Diabetes-2016.  For the purpose of screening for the presence of diabetes:  <5.7%     Consistent with the absence of diabetes  5.7-6.4%  Consistent with increasing risk for diabetes   (prediabetes)  >or=6.5%  Consistent with diabetes  Currently, no consensus exists for use of hemoglobin A1c  for diagnosis of diabetes for children.  This Hemoglobin A1c assay has significant interference with fetal   hemoglobin   (HbF). The results are invalid for patients with abnormal amounts of   HbF,   including those with known Hereditary Persistence   of Fetal Hemoglobin. Heterozygous hemoglobin variants (HbAS, HbAC,   HbAD, HbAE, HbA2) do not significantly interfere with this assay;   however, presence of multiple variants in a sample may impact the %   interference.         Review of Systems   Constitutional: Negative for chills and fever.   Respiratory: Negative for shortness of breath.    Cardiovascular: Negative for chest pain, palpitations, orthopnea, claudication and leg swelling.   Gastrointestinal: Negative for diarrhea, nausea and vomiting.   Musculoskeletal: Negative for joint pain.    Skin: Negative for rash.   Neurological: Positive for sensory change. Negative for dizziness, tingling, focal weakness and weakness.   Psychiatric/Behavioral: Negative.              Objective:      PHYSICAL EXAM: Apperance: Alert and orient in no distress,well developed, and with good attention to grooming and body habits  Lower Extremity Exam  VASCULAR: Dorsalis pedis pulses 0/4 left and Posterior Tibial pulses 1/4 left. Capillary fill time <4 seconds left. Mild edema observed left. Varicosities present left. Skin temperature of the lower extremities is warm to warm, proximal to distal. Hair growth absent left. (--) lymphangitis or (+) cellulitis noted left.  DERMATOLOGICAL: (+) edema, (+) minimal erythema, (--) malodor, (--) drainage, (+) warmth to left foot.  Ulcer noted to left distal hallux and dorsal hallux  with eschar base. Ulcer measurement (dorsal) 0.5cm x 0.5cm (distal) 1.5cm x 1cm.  The ulcer does not extend into deeper tissue and (--) sinus tracts exist.  The dorsum surface of the feet are soft and supple.  The plantar aspects of feet are dry and scaly. Webspaces 1,2,3,4 clean, dry and without evidence of break in skin integrity left.   NEUROLOGICAL: Light touch, sharp-dull, proprioception all present and equal bilaterally.    MUSCULOSKELETAL: Muscle strength is 5/5 for foot inverters, everters, plantarflexors, and dorsiflexors. Muscle tone is normal.  Inspection/palpation of bone, joints and muscles unremarkable with the exception of that noted above. Right BKA noted.                         Assessment:       Gangrene  -     Case Request Operating Room: IRRIGATION AND DEBRIDEMENT, AMPUTATION, TOE; Standing    Left foot infection  -     X-Ray Foot Complete Left; Standing    Long term current use of immunosuppressive drug    Kidney transplant recipient    Type 2 diabetes mellitus with diabetic neuropathy, with long-term current use of insulin    PVD (peripheral vascular disease)    Diabetic foot  infection    Gangrene of toe of left foot    Other orders  -     Saline lock IV; Standing  -     CBC auto differential; Standing  -     Comprehensive metabolic panel; Standing  -     Sedimentation rate; Standing  -     C-reactive protein; Standing  -     Lactic acid, plasma; Standing  -     Protime-INR; Standing  -     APTT; Standing  -     US Lower Extremity Arteries Left; Standing  -     Blood culture; Standing  -     Blood Culture #2 **CANNOT BE ORDERED STAT**; Standing  -     Pharmacy to dose Vancomycin consult; Standing  -     piperacillin-tazobactam 4.5 g in dextrose 5 % 100 mL IVPB (ready to mix system); Inject 100 mLs (4.5 g total) into the vein ED 1 Time.  -     vancomycin in dextrose 5 % 1 gram/250 mL IVPB 1,000 mg; Inject 250 mLs (1,000 mg total) into the vein ED 1 Time.  -     Vital signs; Standing  -     Notify physician ; Standing  -     IP VTE HIGH RISK PATIENT; Standing  -     Cancel: glucose chewable tablet 16 g; Take 4 tablets (16 g total) by mouth as needed for Blood Glucose less than (70 mg/dL X 1 dose for patients who can take PO.).  -     Cancel: glucose chewable tablet 24 g; Take 6 tablets (24 g total) by mouth as needed for Blood Glucose less than (50 mg/dL X 1 dose for patients who can take PO.).  -     Cancel: dextrose 50% injection 12.5 g; Inject 25 mLs (12.5 g total) into the vein as needed for For blood glucose (between 51 - 70 mg/dL if patient cannnot take PO but has IV access.).  -     Cancel: dextrose 50% injection 25 g; Inject 50 mLs (25 g total) into the vein as needed for For blood glucose (less than 50 mg/dL if patient cannnot take PO but has IV access.).  -     Cancel: glucagon (human recombinant) injection 1 mg; Inject 1 mg into the muscle as needed for Blood Glucose less than (70 mg/dL if patient is unconscious or obtunded and DOES NOT HAVE IV ACCESS.).  -     Cancel: Recheck Blood Glucose:; Standing  -     Pulse Oximetry Q4H; Standing  -     Cancel: POCT glucose; Standing  -      Cancel: If any glucose result is less than 50 or greater than 400:; Standing  -     Cancel: If 2nd result is less than 50 or greater than 400:; Standing  -     Do not admin Aspart correction between scheduled prandial Aspart; Standing  -     Full code; Standing  -     Admit to Inpatient; Standing  -     Up in chair; Standing  -     Cancel: Diet diabetic [facility]; 2000 Calorie; Standing  -     Reason for no Mechanical VTE Prophylaxis; Standing  -     Cancel: enoxaparin injection 40 mg; Inject 0.4 mLs (40 mg total) into the skin Daily.  -     Discontinue: piperacillin-tazobactam 4.5 g in dextrose 5 % 100 mL IVPB (ready to mix system); Inject 100 mLs (4.5 g total) into the vein every 8 (eight) hours.  -     Discontinue: morphine injection 2 mg; Inject 0.5 mLs (2 mg total) into the vein every 4 (four) hours as needed for moderate pain 4-6/10 pain scale (not relieved by oral medication).  -     Discontinue: morphine injection 4 mg; Inject 1 mL (4 mg total) into the vein every 4 (four) hours as needed for severe pain 7-10/10 pain scale.  -     ondansetron injection 4 mg; Inject 4 mg into the vein every 8 (eight) hours as needed for Nausea/Vomiting (1st choice) - use as first treatment.  -     Cancel: insulin aspart U-100 pen 0-5 Units; Inject 0-5 Units into the skin before meals and at bedtime as needed for For blood glucose (Hyperglycemia).  -     Cancel: Hemoglobin A1c; Standing  -     Cancel: CBC auto differential; Standing  -     Cancel: Comprehensive metabolic panel; Standing  -     Inpatient consult to Vascular Surgery; Standing  -     Pharmacy to dose Vancomycin consult; Standing  -     Cancel: Diet NPO; Standing  -     Discontinue: piperacillin-tazobactam 4.5 g in dextrose 5 % 100 mL IVPB (ready to mix system); Inject 100 mLs (4.5 g total) into the vein every 8 (eight) hours.  -     Inpatient consult to Podiatry; Standing  -     Discontinue: amLODIPine tablet 2.5 mg; Take 1 tablet (2.5 mg total) by mouth  once daily.  -     aspirin EC tablet 81 mg; Take 1 tablet (81 mg total) by mouth once daily.  -     predniSONE tablet 5 mg; Take 1 tablet (5 mg total) by mouth once daily.  -     sodium bicarbonate tablet 2,600 mg; Take 4 tablets (2,600 mg total) by mouth 2 (two) times daily.  -     tacrolimus capsule 1.5 mg; Take 1.5 mg by mouth 0800, 1800.  -     0.9%  NaCl infusion; Inject into the vein continuous.  -     insulin detemir U-100 pen 10 Units; Inject 10 Units into the skin every evening.  -     budesonide nebulizer solution 0.5 mg; Take 2 mLs (0.5 mg total) by nebulization 2 (two) times daily.  -     Inhalation Treatment Daily; Standing  -     Cancel: Inhalation Treatment Q12H; Standing  -     Discontinue: formoterol nebulizer solution 20 mcg; Take 2 mLs (20 mcg total) by nebulization every 12 (twelve) hours.  -     glucose chewable tablet 16 g; Take 4 tablets (16 g total) by mouth as needed for Blood Glucose less than (70 mg/dL X 1 dose for patients who can take PO.).  -     glucose chewable tablet 24 g; Take 6 tablets (24 g total) by mouth as needed for Blood Glucose less than (50 mg/dL X 1 dose for patients who can take PO.).  -     dextrose 50% injection 12.5 g; Inject 25 mLs (12.5 g total) into the vein as needed for For blood glucose (between 51 - 70 mg/dL if patient cannnot take PO but has IV access.).  -     dextrose 50% injection 25 g; Inject 50 mLs (25 g total) into the vein as needed for For blood glucose (less than 50 mg/dL if patient cannnot take PO but has IV access.).  -     glucagon (human recombinant) injection 1 mg; Inject 1 mg into the muscle as needed for Blood Glucose less than (70 mg/dL if patient is unconscious or obtunded and DOES NOT HAVE IV ACCESS.).  -     Recheck Blood Glucose:; Standing  -     Cancel: POCT glucose; Standing  -     If any glucose result is less than 50 or greater than 400:; Standing  -     If 2nd result is less than 50 or greater than 400:; Standing  -     Do not admin  Aspart correction between scheduled prandial Aspart; Standing  -     insulin aspart U-100 pen 1-10 Units; Inject 1-10 Units into the skin before meals and at bedtime as needed for For blood glucose (Hyperglycemia).  -     Neurovascular checks; Standing  -     Inpatient consult to Nephrology; Standing  -     arformoterol nebulizer solution 15 mcg; Take 2 mLs (15 mcg total) by nebulization 2 (two) times daily.  -     Inhalation Treatment BID; Standing  -     POCT glucose; Standing  -     Discontinue: vancomycin in dextrose 5 % 1 gram/250 mL IVPB 1,000 mg; Inject 250 mLs (1,000 mg total) into the vein every 48 hours.  -     Cancel: Vancomycin, random; Standing  -     Cancel: Hemoglobin A1c; Standing  -     IP consult to case management; Standing  -     CBC auto differential; Standing  -     Cancel: Comprehensive metabolic panel; Standing  -     Hemoglobin A1c; Standing  -     Vancomycin, random; Standing  -     POCT glucose; Standing  -     POCT glucose; Standing  -     vancomycin (VANCOCIN) 1,250 mg in dextrose 5 % 250 mL IVPB; Inject 1,250 mg into the vein ED 1 Time.  -     Cancel: Diet NPO Except for: Sips with Medication; Standing  -     piperacillin-tazobactam 4.5 g in dextrose 5 % 100 mL IVPB (ready to mix system); Inject 100 mLs (4.5 g total) into the vein every 8 (eight) hours.  -     Discontinue: vancomycin in dextrose 5 % 1 gram/250 mL IVPB 1,000 mg; Inject 250 mLs (1,000 mg total) into the vein every 48 hours.  -     Vancomycin, random; Standing  -     POCT glucose; Standing  -     Cancel: Diet diabetic [facility]; 1500 Calorie; Standing  -     Discontinue: silver sulfADIAZINE 1% cream; Apply topically once daily.  -     Cancel: Change dressing; Standing  -     Tacrolimus level; Standing  -     Renal function panel; Standing  -     Urinalysis; Standing  -     Place sequential compression device; Standing  -     Urinalysis Microscopic; Standing  -     POCT glucose; Standing  -     US Lower Extremity  Arteries Left; Standing  -     Cancel: US Lower Extrem Arteries Bilat with LYNN (xpd); Standing  -     POCT glucose; Standing  -     POCT glucose; Standing  -     Discontinue: vancomycin (VANCOCIN) 1,250 mg in dextrose 5 % 250 mL IVPB; Inject 1,250 mg into the vein every 24 hours.  -     VANCOMYCIN, TROUGH before 3rd dose; Standing  -     POCT glucose; Standing  -     HYDROcodone-acetaminophen 5-325 mg per tablet 1 tablet; Take 1 tablet by mouth every 6 (six) hours as needed for mild pain 1-3/10 pain scale.  -     Discontinue: HYDROcodone-acetaminophen  mg per tablet 1 tablet; Take 1 tablet by mouth every 6 (six) hours as needed for moderate pain 4-6/10 pain scale.  -     morphine injection 2 mg; Inject 0.5 mLs (2 mg total) into the vein every 4 (four) hours as needed for severe pain 7-10/10 pain scale (not relieved by oral medication).  -     HYDROcodone-acetaminophen  mg per tablet 1 tablet; Take 1 tablet by mouth every 6 (six) hours as needed for moderate pain 4-6/10 pain scale or severe pain 7-10/10 pain scale.  -     POCT glucose; Standing  -     Discontinue: hydrALAZINE injection 10 mg; Inject 0.5 mLs (10 mg total) into the vein every 8 (eight) hours as needed for SBP greater than (SBP >180).  -     Cancel: Diet NPO Except for: Sips with Medication; Standing  -     POCT glucose; Standing  -     diphenhydrAMINE injection 25 mg; Inject 0.5 mLs (25 mg total) into the vein every 6 (six) hours as needed for Itching.  -     hydrALAZINE injection 10 mg; Inject 0.5 mLs (10 mg total) into the vein every 6 (six) hours as needed for SBP greater than (SBP >180).  -     POCT glucose; Standing  -     nozaseptin (NOZIN) nasal ; by Each Nare route 2 (two) times daily.  -     Cancel: Admit to Phase 1 PACU, transfer to Phase 2 per protocol when indicated ; Standing  -     Cancel: Vital signs; Standing  -     Cancel: Intake and output Per protocol; Standing  -     Cancel: Apply warming blanket; Standing  -      Cancel: Notify Anesthesiologist; Standing  -     Notify Physician - Potential Need of Opioid Reversal; Standing  -     Discontinue: sodium chloride 0.9% flush 3 mL; Inject 3 mLs into the vein every 8 (eight) hours.  -     sodium chloride 0.9% flush 3 mL; Inject 3 mLs into the vein as needed for Line Care.  -     Cancel: Oxygen Continuous; Standing  -     Cancel: Pulse Oximetry Q4H; Standing  -     Discontinue: oxyCODONE immediate release tablet 5 mg; Take 1 tablet (5 mg total) by mouth every 3 (three) hours as needed (moderate pain 2-5/10 pain scale).  -     Discontinue: fentaNYL injection 25 mcg; Inject 0.5 mLs (25 mcg total) into the vein every 5 (five) minutes as needed for severe pain 7-10/10 pain scale.  -     Discontinue: meperidine injection 12.5 mg; Inject 0.25 mLs (12.5 mg total) into the vein every 10 (ten) minutes as needed for tremors.  -     Discontinue: diphenhydrAMINE injection 12.5 mg; Inject 0.25 mLs (12.5 mg total) into the vein every 6 (six) hours as needed for Itching.  -     Discontinue: promethazine (PHENERGAN) 6.25 mg in dextrose 5 % 50 mL IVPB; Inject 6.25 mg into the vein every 10 (ten) minutes as needed for Nausea/Vomiting (1st choice) - use as first treatment.  -     vancomycin (VANCOCIN) 1,250 mg in dextrose 5 % 250 mL IVPB; Inject 1,250 mg into the vein every 48 hours.  -     Discontinue: lidocaine (PF) 10 mg/ml (1%) injection; as needed.  -     Discontinue: bupivacaine (PF) injection; as needed.  -     Transfer patient; Standing  -     Diet diabetic [facility]; 2000 Calorie; Standing  -     Culture, Anaerobe; Standing  -     Aerobic culture; Standing  -     Specimen to Pathology - Surgery; Standing  -     POCT glucose; Standing  -     Vancomycin, random; Standing  -     POCT glucose; Standing          Plan:   Gangrene  -     Case Request Operating Room: IRRIGATION AND DEBRIDEMENT, AMPUTATION, TOE; Standing    Left foot infection  -     X-Ray Foot Complete Left; Standing    Long  term current use of immunosuppressive drug    Kidney transplant recipient    Type 2 diabetes mellitus with diabetic neuropathy, with long-term current use of insulin    PVD (peripheral vascular disease)    Diabetic foot infection    Gangrene of toe of left foot    Other orders  -     Saline lock IV; Standing  -     CBC auto differential; Standing  -     Comprehensive metabolic panel; Standing  -     Sedimentation rate; Standing  -     C-reactive protein; Standing  -     Lactic acid, plasma; Standing  -     Protime-INR; Standing  -     APTT; Standing  -     US Lower Extremity Arteries Left; Standing  -     Blood culture; Standing  -     Blood Culture #2 **CANNOT BE ORDERED STAT**; Standing  -     Pharmacy to dose Vancomycin consult; Standing  -     piperacillin-tazobactam 4.5 g in dextrose 5 % 100 mL IVPB (ready to mix system); Inject 100 mLs (4.5 g total) into the vein ED 1 Time.  -     vancomycin in dextrose 5 % 1 gram/250 mL IVPB 1,000 mg; Inject 250 mLs (1,000 mg total) into the vein ED 1 Time.  -     Vital signs; Standing  -     Notify physician ; Standing  -     IP VTE HIGH RISK PATIENT; Standing  -     Cancel: glucose chewable tablet 16 g; Take 4 tablets (16 g total) by mouth as needed for Blood Glucose less than (70 mg/dL X 1 dose for patients who can take PO.).  -     Cancel: glucose chewable tablet 24 g; Take 6 tablets (24 g total) by mouth as needed for Blood Glucose less than (50 mg/dL X 1 dose for patients who can take PO.).  -     Cancel: dextrose 50% injection 12.5 g; Inject 25 mLs (12.5 g total) into the vein as needed for For blood glucose (between 51 - 70 mg/dL if patient cannnot take PO but has IV access.).  -     Cancel: dextrose 50% injection 25 g; Inject 50 mLs (25 g total) into the vein as needed for For blood glucose (less than 50 mg/dL if patient cannnot take PO but has IV access.).  -     Cancel: glucagon (human recombinant) injection 1 mg; Inject 1 mg into the muscle as needed for Blood  Glucose less than (70 mg/dL if patient is unconscious or obtunded and DOES NOT HAVE IV ACCESS.).  -     Cancel: Recheck Blood Glucose:; Standing  -     Pulse Oximetry Q4H; Standing  -     Cancel: POCT glucose; Standing  -     Cancel: If any glucose result is less than 50 or greater than 400:; Standing  -     Cancel: If 2nd result is less than 50 or greater than 400:; Standing  -     Do not admin Aspart correction between scheduled prandial Aspart; Standing  -     Full code; Standing  -     Admit to Inpatient; Standing  -     Up in chair; Standing  -     Cancel: Diet diabetic [facility]; 2000 Calorie; Standing  -     Reason for no Mechanical VTE Prophylaxis; Standing  -     Cancel: enoxaparin injection 40 mg; Inject 0.4 mLs (40 mg total) into the skin Daily.  -     Discontinue: piperacillin-tazobactam 4.5 g in dextrose 5 % 100 mL IVPB (ready to mix system); Inject 100 mLs (4.5 g total) into the vein every 8 (eight) hours.  -     Discontinue: morphine injection 2 mg; Inject 0.5 mLs (2 mg total) into the vein every 4 (four) hours as needed for moderate pain 4-6/10 pain scale (not relieved by oral medication).  -     Discontinue: morphine injection 4 mg; Inject 1 mL (4 mg total) into the vein every 4 (four) hours as needed for severe pain 7-10/10 pain scale.  -     ondansetron injection 4 mg; Inject 4 mg into the vein every 8 (eight) hours as needed for Nausea/Vomiting (1st choice) - use as first treatment.  -     Cancel: insulin aspart U-100 pen 0-5 Units; Inject 0-5 Units into the skin before meals and at bedtime as needed for For blood glucose (Hyperglycemia).  -     Cancel: Hemoglobin A1c; Standing  -     Cancel: CBC auto differential; Standing  -     Cancel: Comprehensive metabolic panel; Standing  -     Inpatient consult to Vascular Surgery; Standing  -     Pharmacy to dose Vancomycin consult; Standing  -     Cancel: Diet NPO; Standing  -     Discontinue: piperacillin-tazobactam 4.5 g in dextrose 5 % 100 mL  IVPB (ready to mix system); Inject 100 mLs (4.5 g total) into the vein every 8 (eight) hours.  -     Inpatient consult to Podiatry; Standing  -     amLODIPine tablet 2.5 mg; Take 1 tablet (2.5 mg total) by mouth once daily.  -     aspirin EC tablet 81 mg; Take 1 tablet (81 mg total) by mouth once daily.  -     predniSONE tablet 5 mg; Take 1 tablet (5 mg total) by mouth once daily.  -     sodium bicarbonate tablet 2,600 mg; Take 4 tablets (2,600 mg total) by mouth 2 (two) times daily.  -     tacrolimus capsule 1.5 mg; Take 1.5 mg by mouth 0800, 1800.  -     0.9%  NaCl infusion; Inject into the vein continuous.  -     insulin detemir U-100 pen 10 Units; Inject 10 Units into the skin every evening.  -     budesonide nebulizer solution 0.5 mg; Take 2 mLs (0.5 mg total) by nebulization 2 (two) times daily.  -     Inhalation Treatment Daily; Standing  -     Cancel: Inhalation Treatment Q12H; Standing  -     Discontinue: formoterol nebulizer solution 20 mcg; Take 2 mLs (20 mcg total) by nebulization every 12 (twelve) hours.  -     glucose chewable tablet 16 g; Take 4 tablets (16 g total) by mouth as needed for Blood Glucose less than (70 mg/dL X 1 dose for patients who can take PO.).  -     glucose chewable tablet 24 g; Take 6 tablets (24 g total) by mouth as needed for Blood Glucose less than (50 mg/dL X 1 dose for patients who can take PO.).  -     dextrose 50% injection 12.5 g; Inject 25 mLs (12.5 g total) into the vein as needed for For blood glucose (between 51 - 70 mg/dL if patient cannnot take PO but has IV access.).  -     dextrose 50% injection 25 g; Inject 50 mLs (25 g total) into the vein as needed for For blood glucose (less than 50 mg/dL if patient cannnot take PO but has IV access.).  -     glucagon (human recombinant) injection 1 mg; Inject 1 mg into the muscle as needed for Blood Glucose less than (70 mg/dL if patient is unconscious or obtunded and DOES NOT HAVE IV ACCESS.).  -     Recheck Blood Glucose:;  Standing  -     Cancel: POCT glucose; Standing  -     If any glucose result is less than 50 or greater than 400:; Standing  -     If 2nd result is less than 50 or greater than 400:; Standing  -     Do not admin Aspart correction between scheduled prandial Aspart; Standing  -     insulin aspart U-100 pen 1-10 Units; Inject 1-10 Units into the skin before meals and at bedtime as needed for For blood glucose (Hyperglycemia).  -     Neurovascular checks; Standing  -     Inpatient consult to Nephrology; Standing  -     arformoterol nebulizer solution 15 mcg; Take 2 mLs (15 mcg total) by nebulization 2 (two) times daily.  -     Inhalation Treatment BID; Standing  -     POCT glucose; Standing  -     Discontinue: vancomycin in dextrose 5 % 1 gram/250 mL IVPB 1,000 mg; Inject 250 mLs (1,000 mg total) into the vein every 48 hours.  -     Cancel: Vancomycin, random; Standing  -     Cancel: Hemoglobin A1c; Standing  -     IP consult to case management; Standing  -     CBC auto differential; Standing  -     Cancel: Comprehensive metabolic panel; Standing  -     Hemoglobin A1c; Standing  -     Vancomycin, random; Standing  -     POCT glucose; Standing  -     POCT glucose; Standing  -     vancomycin (VANCOCIN) 1,250 mg in dextrose 5 % 250 mL IVPB; Inject 1,250 mg into the vein ED 1 Time.  -     Cancel: Diet NPO Except for: Sips with Medication; Standing  -     piperacillin-tazobactam 4.5 g in dextrose 5 % 100 mL IVPB (ready to mix system); Inject 100 mLs (4.5 g total) into the vein every 8 (eight) hours.  -     Discontinue: vancomycin in dextrose 5 % 1 gram/250 mL IVPB 1,000 mg; Inject 250 mLs (1,000 mg total) into the vein every 48 hours.  -     Vancomycin, random; Standing  -     POCT glucose; Standing  -     Cancel: Diet diabetic [facility]; 1500 Calorie; Standing  -     silver sulfADIAZINE 1% cream; Apply topically once daily.  -     Change dressing; Standing  -     Tacrolimus level; Standing  -     Renal function panel;  Standing  -     Urinalysis; Standing  -     Place sequential compression device; Standing  -     Urinalysis Microscopic; Standing  -     POCT glucose; Standing  -     US Lower Extremity Arteries Left; Standing  -     Cancel: US Lower Extrem Arteries Bilat with LYNN (xpd); Standing  -     POCT glucose; Standing  -     POCT glucose; Standing  -     vancomycin (VANCOCIN) 1,250 mg in dextrose 5 % 250 mL IVPB; Inject 1,250 mg into the vein every 24 hours.  -     VANCOMYCIN, TROUGH before 3rd dose; Standing  -     POCT glucose; Standing  -     HYDROcodone-acetaminophen 5-325 mg per tablet 1 tablet; Take 1 tablet by mouth every 6 (six) hours as needed for mild pain 1-3/10 pain scale.  -     Discontinue: HYDROcodone-acetaminophen  mg per tablet 1 tablet; Take 1 tablet by mouth every 6 (six) hours as needed for moderate pain 4-6/10 pain scale.  -     morphine injection 2 mg; Inject 0.5 mLs (2 mg total) into the vein every 4 (four) hours as needed for severe pain 7-10/10 pain scale (not relieved by oral medication).  -     HYDROcodone-acetaminophen  mg per tablet 1 tablet; Take 1 tablet by mouth every 6 (six) hours as needed for moderate pain 4-6/10 pain scale or severe pain 7-10/10 pain scale.  -     POCT glucose; Standing  -     Discontinue: hydrALAZINE injection 10 mg; Inject 0.5 mLs (10 mg total) into the vein every 8 (eight) hours as needed for SBP greater than (SBP >180).  -     Diet NPO Except for: Sips with Medication; Standing  -     POCT glucose; Standing  -     diphenhydrAMINE injection 25 mg; Inject 0.5 mLs (25 mg total) into the vein every 6 (six) hours as needed for Itching.  -     hydrALAZINE injection 10 mg; Inject 0.5 mLs (10 mg total) into the vein every 6 (six) hours as needed for SBP greater than (SBP >180).  -     POCT glucose; Standing  -     nozaseptin (NOZIN) nasal ; by Each Nare route 2 (two) times daily.      I counseled the patient on his conditions, regarding findings of my  examination, my impressions, and usual treatment plan.   Discussed with patient in detail the treatment options for dry gangrene, including (1) local wound care, or (2) surgical excision(amputation) of dry gangrene. Risk for treatments including further limb loss, delayed healing, non-healing was also discussed. Patient states he understands both treatment options and would like to proceed with surgical wound debridement and local wound care. Patient states he also understands that he may still need amputation if local wound care does not work.   Patient to OR 7/9/18 for surgical management for gangrene and ulcer of left hallux. All concerns and questions answered by myself, Dr. Gómez DPM. No gurantees given nor implied.                 Katerina Magaña DPM  [facility]

## 2018-07-09 NOTE — ANESTHESIA PREPROCEDURE EVALUATION
07/09/2018  Lavelle Ladd is a 62 y.o., male with ESRD secondary to diabetic nephropathy on HD since 2013, HTN, DM2, anemia, secondary hyperparathyroidism, diastolic heart failure, Hep C, & GERD who presents for:    Past Surgical History:   Procedure Laterality Date    AORTOGRAPHY WITH SERIALOGRAPHY N/A 6/14/2018    Procedure: LEFT LEG ANGIOGRAM;  Surgeon: Donal Mcdonald MD;  Location: [facility] CATH LAB;  Service: Vascular;  Laterality: N/A;    av bovine graft      Left UE    AV FISTULA PLACEMENT      left UE    CARDIAC CATHETERIZATION  02/2015    KIDNEY TRANSPLANT  05/21/2016    LEG AMPUTATION THROUGH KNEE  2011    right LE, started as nail puncture leading to diabetic ulcer       Pre-op Assessment    I have reviewed the Patient Summary Reports.     I have reviewed the Nursing Notes.   I have reviewed the Medications.     Review of Systems  Anesthesia Hx:  No problems with previous Anesthesia  History of prior surgery of interest to airway management or planning: nephrectomy. Previous anesthesia: General kidney transplant 2016 with general anesthesia.  Denies Family Hx of Anesthesia complications.   Denies Personal Hx of Anesthesia complications.   Social:  Former Smoker    Hematology/Oncology:     Oncology Normal    -- Anemia:   Cardiovascular:   Hypertension, poorly controlled CAD   Angina CHF PND ECG has been reviewed. EKG - NSR     2015 2D echo -EF 55-60%   Pulmonary:   Denies COPD. Shortness of breath  Denies Sleep Apnea.    Renal/:   Chronic Renal Disease, ESRD, Dialysis    Hepatic/GI:   PUD, GERD Liver Disease, Hepatitis, C    Musculoskeletal:   Arthritis  DDD and osteoarthritis in Lumbar region   Neurological:   Denies CVA. Denies Seizures. Ulnar nueropathy   Endocrine:   Diabetes, type 2        Physical Exam  General:  Well nourished    Airway/Jaw/Neck:  Airway Findings: Mouth Opening: Normal  Tongue: Normal  Pre-Existing Airway Tube(s): Oral Endotracheal tube  General Airway Assessment: Adult  Mallampati: II  Improves to I with phonation.  TM Distance: Normal, at least 6 cm  Jaw/Neck Findings:  Neck ROM: Normal ROM      Dental:  Dental Findings:    Chest/Lungs:  Chest/Lungs Findings: Normal Respiratory Rate     Heart/Vascular:  Heart Findings: Rate: Normal       Skin:  R BKA   Mental Status:  Mental Status Findings:  Cooperative, Alert and Oriented         Anesthesia Plan  Type of Anesthesia, risks & benefits discussed:  Anesthesia Type:  MAC  Patient's Preference:   Intra-op Monitoring Plan: standard ASA monitors  Intra-op Monitoring Plan Comments:   Post Op Pain Control Plan:   Post Op Pain Control Plan Comments:   Induction:   IV  Beta Blocker:  Patient is not currently on a Beta-Blocker (No further documentation required).       Informed Consent: Patient understands risks and agrees with Anesthesia plan.  Questions answered. Anesthesia consent signed with patient.  ASA Score: 3     Day of Surgery Review of History & Physical: I have interviewed and examined the patient. I have reviewed the patient's H&P dated:            Ready For Surgery From Anesthesia Perspective.

## 2018-07-09 NOTE — TRANSFER OF CARE
"Anesthesia Transfer of Care Note    Patient: Lavelle Ladd    Procedure(s) Performed: Procedure(s) (LRB):  IRRIGATION AND DEBRIDEMENT (Left)    Patient location: PACU    Anesthesia Type: MAC    Transport from OR: Transported from OR on room air with adequate spontaneous ventilation    Post pain: adequate analgesia    Post assessment: no apparent anesthetic complications    Post vital signs: stable    Level of consciousness: awake    Nausea/Vomiting: no nausea/vomiting    Complications: none    Transfer of care protocol was followed      Last vitals:   Visit Vitals  /69 (BP Location: Right arm, Patient Position: Sitting)   Pulse 100   Temp 36.4 °C (97.5 °F) (Oral)   Resp 18   Ht 5' 11" (1.803 m)   Wt 101.4 kg (223 lb 8.7 oz)   SpO2 (!) 92%   BMI 31.18 kg/m²     "

## 2018-07-09 NOTE — SUBJECTIVE & OBJECTIVE
Interval History:  No acute events overnight.  Resting comfortably in bed eating breakfast.  s/p I and D per Dr. Magaña today.  Wound culture pending.        Review of Systems   Constitutional: Negative for activity change, appetite change, chills, fatigue and fever.   HENT: Negative.  Negative for congestion.    Eyes: Negative for pain, redness and visual disturbance.   Respiratory: Negative for cough, shortness of breath and wheezing.    Cardiovascular: Positive for leg swelling. Negative for chest pain and palpitations.   Gastrointestinal: Negative for abdominal pain, constipation, diarrhea, nausea and vomiting.   Genitourinary: Negative.    Musculoskeletal: Positive for back pain and gait problem.   Skin: Positive for color change and wound.   Neurological: Positive for numbness. Negative for dizziness and light-headedness.        + numbness to bilateral feet   Psychiatric/Behavioral: Negative for agitation and confusion. The patient is not nervous/anxious.      Objective:     Vital Signs (Most Recent):  Temp: 97.5 °F (36.4 °C) (07/09/18 1123)  Pulse: 93 (07/09/18 1123)  Resp: 18 (07/09/18 1123)  BP: (!) 108/59 (07/09/18 1123)  SpO2: 95 % (07/09/18 1123) Vital Signs (24h Range):  Temp:  [97.5 °F (36.4 °C)-98 °F (36.7 °C)] 97.5 °F (36.4 °C)  Pulse:  [] 93  Resp:  [14-20] 18  SpO2:  [92 %-99 %] 95 %  BP: (105-230)/() 108/59     Weight: 101.4 kg (223 lb 8.7 oz)  Body mass index is 31.18 kg/m².    Intake/Output Summary (Last 24 hours) at 07/09/18 1126  Last data filed at 07/09/18 1000   Gross per 24 hour   Intake          2879.16 ml   Output                0 ml   Net          2879.16 ml      Physical Exam   Constitutional: He is oriented to person, place, and time. He appears well-developed and well-nourished. No distress.   HENT:   Head: Normocephalic and atraumatic.   Eyes: Conjunctivae and EOM are normal. Right eye exhibits no discharge.   Neck: Normal range of motion. Neck supple. No tracheal  deviation present. No thyromegaly present.   Cardiovascular: Normal rate, regular rhythm and normal heart sounds.  Exam reveals no gallop and no friction rub.    No murmur heard.  Pulmonary/Chest: Effort normal and breath sounds normal. He has no wheezes. He has no rales.   Abdominal: Soft. Bowel sounds are normal. He exhibits no mass. There is no tenderness. There is no rebound and no guarding.   Musculoskeletal: He exhibits edema.   Left toe gangrene , s/p right BKA, mild edema to left foot   Lymphadenopathy:     He has no cervical adenopathy.   Neurological: He is alert and oriented to person, place, and time.   Skin: Skin is warm. No rash noted. He is not diaphoretic. There is erythema.   Surgical DSG to Left foot CDI   Psychiatric: He has a normal mood and affect. His behavior is normal.   Nursing note and vitals reviewed.      Significant Labs:   Blood Culture: No results for input(s): LABBLOO in the last 48 hours.  BMP:   Recent Labs  Lab 07/09/18  0452   *      K 3.9      CO2 22*   BUN 21   CREATININE 1.9*   CALCIUM 10.5     CBC:   Recent Labs  Lab 07/08/18  0429 07/09/18  0452   WBC 5.02 5.58   HGB 14.7 15.5   HCT 44.5 45.6    211       Significant Imaging: I have reviewed all pertinent imaging results/findings within the past 24 hours.

## 2018-07-09 NOTE — ASSESSMENT & PLAN NOTE
- BP labile with elevated levels noted last night  - Continue Norvasc  - Hydralazine PRN SBP >180  - Monitor

## 2018-07-09 NOTE — ASSESSMENT & PLAN NOTE
1. S/p DDRT in 2016 : stable allograft fn , Cr at baseline at 1.9 mg/dl,     cont Prograf 1.5 mg bid and Pred 5 mg daily , follow Tac level ,      2. HTN : BP on low side, will stop amlodipine and monitor      3. Left toe gangrene,  Podiatry and vascular following , on broad spectrum abx , follow Vanc levels closely,      4. Metabolic acidosis : cont Bicarb supplements      5. Anemia of CKD : stable Hb

## 2018-07-09 NOTE — ASSESSMENT & PLAN NOTE
- Admitted to Medicine  - Podiatry consulted from the ER; per Dr. Magaña will likely need amputation  - Evaluated by vascular surgeon Dr. Uribe.  Per vascular, left foot arterial blood flow is adequate.  Ok for podiatry to proceed with OR for aggressive local debridement/amputation.  Reconsult if any further assistance is required.   - s/p I and D per Dr. Magaña on 7/9  - Wound culture pending.    - Continue empiric ABX with Vanco and Zosyn  - Local wound care per podiatry recs  - Neurovascular checks  - BC show NGTD  - Daily labs  - Monitor

## 2018-07-09 NOTE — OP NOTE
Patient: Lavelle Ladd  : 1955  MR# [id number]  DOS: 16  Surgeon: Dr. Katerina Magaña DPM  Assistant: None  Pre-Op Dx: Gangrene and Ulcer Left Hallux  Post-Op Dx: Gangrene and Ulcer Left Hallux  Procedure: Irrigation and Debridement of Left Hallux Gangrene and Ulcer  Anesthesia: Local MAC  Hemostasis: Pressure  EBL: 2cc  Materials: None  Findings: Necrotic soft tissue and bone      Procedure in Detail:    Patient was brought into operating room and placed on operating table in the supine position. Following IV sedation the Left foot was then scrubbed, prepped, and draped in the usual aseptic manner.    Attention was then directed to the dorsal hallux ulcer of the Left foot, where utilizing a #15 blade sharp debridement the fibrotic slough tissue was removed until pik granular tissue was seen. Next utilizing a #15 blade sharp debridement of all eschar and necrotic tissue at the distal medial hallux was removed until pink granular tissue was seen. At this time the distal medial aspect of the distal phalanx was noted to be soft and necrotic. The wound was then irrigated with sterile saline solution.   Wound was reinspected and found to have a pink granular base. The wound was then dressed with compressive dressing consisting of 4x4 gauze, leonard, and ACE bandages    The patient tolerated anesthesia and procedure well. The patient was transported to PACU with vital signs stable and neurovascular status intact to the Left foot.

## 2018-07-09 NOTE — PROGRESS NOTES
[facility] Hospital Medicine  Progress Note    Patient Name: Lavelle Ladd  MRN: [id number]  Patient Class: IP- Inpatient   Admission Date: 7/6/2018  Length of Stay: 3 days  Attending Physician: Stephanie Mueller MD  Primary Care Provider: Rishabh Esteban MD        Subjective:     Principal Problem:Gangrene of toe of left foot    HPI:  Lavelle Ladd is a 62 year old male with type II diabetes mellitus, diabetic neuropathy, chronic back pain, right above the knee amputation and history of kidney transplant on 5/21/16, on Prograf and prednisone, who presents to the ED for an darkened wound on his left distal hallux. He describes increased pain and swelling to the left foot. He states the area of eschar has increased, the nail has fallen off and there is clear fluid drainage from the wound. He denies fever, chills.          Arterial US indicates normal velocities within the common femoral, superficial femoral, popliteal and deep profunda - no focal stenosis or occlusion.          Plain film shows a non healed fracture of the base of the proximal phalanx of the 2nd toe, no air in the soft tissues. CBC is unremarkable, CMP shows hyponatremia, CKD stage 3 (baseline creatinine 1.6 - 1.8), glucose 356, CRP 9.6, lactate 2.2. Pt was admitted in early June for toe infection, treated with ABX and underwent angiogram performed by Dr. Mcdonald.  (tibial and posterior tibial artery atherectomy with a balloon angioplasty and a left SFA atherectomy with balloon angioplasty). He was discharged on Keflex. Mr. Ladd returned to the ED on 6/29 for toe infection and prescribed Clindamycin.           Hospital Course:  On 7/6 patient was admitted to Medicine secondary to Gangrene of Left great toe.  Xray of left foot showed no acute findings.  No change in the incompletely healed fracture of the base of the proximal phalanx of the 2nd toe.  Arterial US showed diffuse atherosclerotic change of the left lower extremity  arteries.  No findings suspicious for focal stenosis or occlusion.  He has a h/o PVD s/p Angiogram with balloon angioplasty and atherectomy to LLE per Dr. Mcdonald in June.  Podiatry consulted in the ER and per Dr. Magaña he will likely need amputation.  Vascular consult pending.  Started on Vanc and Zosyn.  Nephrology consulted given h/o kidney transplant.  BC continue to show NGTD.  As of 7/8 patient has been evaluated by vascular surgeon Dr. Uribe.  Per vascular, left foot arterial blood flow is adequate.  Ok for podiatry to proceed with OR for aggressive local debridement/amputation.  Reconsult if any further assistance is required.  Will d/w Dr. Magaña to determine POC moving forward.  Continue local care per podiatry recs for now.  As of 7/9 patient is s/p I and D per Dr. Magaña today.  Wound culture pending.      Interval History:  No acute events overnight.  Resting comfortably in bed eating breakfast.  s/p I and D per Dr. Magaña today.  Wound culture pending.        Review of Systems   Constitutional: Negative for activity change, appetite change, chills, fatigue and fever.   HENT: Negative.  Negative for congestion.    Eyes: Negative for pain, redness and visual disturbance.   Respiratory: Negative for cough, shortness of breath and wheezing.    Cardiovascular: Positive for leg swelling. Negative for chest pain and palpitations.   Gastrointestinal: Negative for abdominal pain, constipation, diarrhea, nausea and vomiting.   Genitourinary: Negative.    Musculoskeletal: Positive for back pain and gait problem.   Skin: Positive for color change and wound.   Neurological: Positive for numbness. Negative for dizziness and light-headedness.        + numbness to bilateral feet   Psychiatric/Behavioral: Negative for agitation and confusion. The patient is not nervous/anxious.      Objective:     Vital Signs (Most Recent):  Temp: 97.5 °F (36.4 °C) (07/09/18 1123)  Pulse: 93 (07/09/18 1123)  Resp: 18 (07/09/18 1123)  BP:  (!) 108/59 (07/09/18 1123)  SpO2: 95 % (07/09/18 1123) Vital Signs (24h Range):  Temp:  [97.5 °F (36.4 °C)-98 °F (36.7 °C)] 97.5 °F (36.4 °C)  Pulse:  [] 93  Resp:  [14-20] 18  SpO2:  [92 %-99 %] 95 %  BP: (105-230)/() 108/59     Weight: 101.4 kg (223 lb 8.7 oz)  Body mass index is 31.18 kg/m².    Intake/Output Summary (Last 24 hours) at 07/09/18 1126  Last data filed at 07/09/18 1000   Gross per 24 hour   Intake          2879.16 ml   Output                0 ml   Net          2879.16 ml      Physical Exam   Constitutional: He is oriented to person, place, and time. He appears well-developed and well-nourished. No distress.   HENT:   Head: Normocephalic and atraumatic.   Eyes: Conjunctivae and EOM are normal. Right eye exhibits no discharge.   Neck: Normal range of motion. Neck supple. No tracheal deviation present. No thyromegaly present.   Cardiovascular: Normal rate, regular rhythm and normal heart sounds.  Exam reveals no gallop and no friction rub.    No murmur heard.  Pulmonary/Chest: Effort normal and breath sounds normal. He has no wheezes. He has no rales.   Abdominal: Soft. Bowel sounds are normal. He exhibits no mass. There is no tenderness. There is no rebound and no guarding.   Musculoskeletal: He exhibits edema.   Left toe gangrene , s/p right BKA, mild edema to left foot   Lymphadenopathy:     He has no cervical adenopathy.   Neurological: He is alert and oriented to person, place, and time.   Skin: Skin is warm. No rash noted. He is not diaphoretic. There is erythema.   Surgical DSG to Left foot CDI   Psychiatric: He has a normal mood and affect. His behavior is normal.   Nursing note and vitals reviewed.      Significant Labs:     BMP:   Recent Labs  Lab 07/09/18  0452   *      K 3.9      CO2 22*   BUN 21   CREATININE 1.9*   CALCIUM 10.5     CBC:   Recent Labs  Lab 07/08/18  0429 07/09/18  0452   WBC 5.02 5.58   HGB 14.7 15.5   HCT 44.5 45.6    211        Significant Imaging: I have reviewed all pertinent imaging results/findings within the past 24 hours.    Assessment/Plan:      * Gangrene of toe of left foot    - Admitted to Medicine  - Podiatry consulted from the ER; per Dr. Magaña will likely need amputation  - Evaluated by vascular surgeon Dr. Uribe.  Per vascular, left foot arterial blood flow is adequate.  Ok for podiatry to proceed with OR for aggressive local debridement/amputation.  Reconsult if any further assistance is required.   - s/p I and D per Dr. Magaña on 7/9  - Wound culture pending.    - Continue empiric ABX with Vanco and Zosyn  - Local wound care per podiatry recs  - Neurovascular checks  - BC show NGTD  - Daily labs  - Monitor        Diabetic foot infection    - See #1          Peripheral vascular disease    -S/P angiogram performed by Dr. Mcdonald in early June to left leg (tibial and posterior tibial artery atherectomy with a balloon angioplasty and a left SFA atherectomy with balloon angioplasty).   - Vascular evaluated; left foot arterial blood flow is adequate.   - Continue ASA         Kidney transplant recipient    - Nephrology consulted  - Continue Prograf and Prednisone  - Monitor Prograf levels; currently 5.6          Chronic kidney disease (CKD), stage III (moderate)    - Cr stable at 1.9  - Baseline Cr 1.6 - 1.8  - Nephrology consulted as patient is renal transplant recipient taking Prograf and prednisone          Type 2 diabetes mellitus with hyperglycemia, with long-term current use of insulin    - A1c 10.2  - BG under fair control here  - Continue Detemir  - Accu checks with sliding scale insulin prn  - Diabetic diet          Chronic bilateral low back pain with left-sided sciatica    - PRN analgesia  - Monitor          Long term current use of immunosuppressive drug    - Continue Prednisone and Prograf per Nephrology             Essential hypertension    - BP labile with elevated levels noted last night  - Continue Norvasc  -  Hydralazine PRN SBP >180  - Monitor            VTE Risk Mitigation         Ordered     Place sequential compression device  Until discontinued      07/07/18 1441     IP VTE HIGH RISK PATIENT  Once      07/06/18 1914     Reason for no Mechanical VTE Prophylaxis  Once      07/06/18 1914              Sheyla Robbins, MARCELINO, ACNP-BC  Department of Hospital Medicine   [facility]

## 2018-07-09 NOTE — BRIEF OP NOTE
[facility] -   Brief Operative Note    SUMMARY     Surgery Date: 7/9/2018     Surgeon(s) and Role:     * Katerina Magaña DPM - Primary    Assisting Surgeon: None    Pre-op Diagnosis:  Gangrene [I96]    Post-op Diagnosis:  Post-Op Diagnosis Codes:     * Gangrene [I96]      Anesthesia: Local MAC    Description of Procedure: Debridement of left hallux ulcer/gangrene    Description of the findings of the procedure: Necrotic soft tissue bone of distal left hallux    Estimated Blood Loss: 2cc         Specimens:   Specimen (12h ago through future)    None

## 2018-07-09 NOTE — SUBJECTIVE & OBJECTIVE
Interval History:  No acute events , s/p surgery for right toe gangrene,     Review of patient's allergies indicates: No Known Allergies      Current Facility-Administered Medications   Medication Frequency    0.9%  NaCl infusion Continuous    arformoterol nebulizer solution 15 mcg BID    aspirin EC tablet 81 mg Daily    budesonide nebulizer solution 0.5 mg BID    dextrose 50% injection 12.5 g PRN    dextrose 50% injection 25 g PRN    diphenhydrAMINE injection 25 mg Q6H PRN    glucagon (human recombinant) injection 1 mg PRN    glucose chewable tablet 16 g PRN    glucose chewable tablet 24 g PRN    hydrALAZINE injection 10 mg Q6H PRN    HYDROcodone-acetaminophen  mg per tablet 1 tablet Q6H PRN    HYDROcodone-acetaminophen 5-325 mg per tablet 1 tablet Q6H PRN    insulin aspart U-100 pen 1-10 Units QID (AC + HS) PRN    insulin detemir U-100 pen 10 Units QHS    morphine injection 2 mg Q4H PRN    nozaseptin (NOZIN) nasal  BID    ondansetron injection 4 mg Q8H PRN    piperacillin-tazobactam 4.5 g in dextrose 5 % 100 mL IVPB (ready to mix system) Q8H    predniSONE tablet 5 mg Daily    sodium bicarbonate tablet 2,600 mg BID    sodium chloride 0.9% flush 3 mL PRN    tacrolimus capsule 1.5 mg BID    vancomycin (VANCOCIN) 1,250 mg in dextrose 5 % 250 mL IVPB Q48H       Objective:     Vital Signs (Most Recent):  Temp: 97.5 °F (36.4 °C) (07/09/18 1123)  Pulse: 93 (07/09/18 1123)  Resp: 18 (07/09/18 1123)  BP: (!) 108/59 (07/09/18 1123)  SpO2: 95 % (07/09/18 1123)  O2 Device (Oxygen Therapy): room air (07/09/18 1002) Vital Signs (24h Range):  Temp:  [97.5 °F (36.4 °C)-98 °F (36.7 °C)] 97.5 °F (36.4 °C)  Pulse:  [] 93  Resp:  [14-20] 18  SpO2:  [92 %-99 %] 95 %  BP: (105-230)/() 108/59     Weight: 101.4 kg (223 lb 8.7 oz) (07/06/18 1357)  Body mass index is 31.18 kg/m².  Body surface area is 2.25 meters squared.    I/O last 3 completed shifts:  In: 3210.8 [P.O.:720; I.V.:1840.8;  IV Piggyback:650]  Out: 425 [Urine:425]    Physical Exam   Constitutional: He is oriented to person, place, and time. He appears well-developed and well-nourished. No distress.   HENT:   Head: Normocephalic and atraumatic.   Eyes: Pupils are equal, round, and reactive to light.   Neck: Normal range of motion. Neck supple. No tracheal deviation present. No thyromegaly present.   Cardiovascular: Normal rate, regular rhythm, normal heart sounds and intact distal pulses.  Exam reveals no gallop and no friction rub.    No murmur heard.  Pulmonary/Chest: Effort normal and breath sounds normal. He has no wheezes. He has no rales.   Abdominal: Soft. He exhibits no mass. There is no tenderness. There is no rebound and no guarding.   No allograft tenderness    Musculoskeletal: Normal range of motion. He exhibits no edema.   S/p right BKA , left great toe with partial gangrene   Lymphadenopathy:     He has no cervical adenopathy.   Neurological: He is alert and oriented to person, place, and time.   Skin: Skin is warm. No rash noted. He is not diaphoretic. No erythema.   Nursing note and vitals reviewed.      Significant Labs:    CBC:     Recent Labs  Lab 07/09/18 0452   WBC 5.58   RBC 4.93   HGB 15.5   HCT 45.6      MCV 93   MCH 31.4*   MCHC 34.0     CMP:   Recent Labs  Lab 07/07/18 0432 07/09/18 0452   *  < > 184*   CALCIUM 9.4  < > 10.5   ALBUMIN 3.3*  < > 3.5   PROT 6.7  --   --      < > 138   K 4.1  < > 3.9   CO2 22*  < > 22*     < > 104   BUN 22  < > 21   CREATININE 1.7*  < > 1.9*   ALKPHOS 77  --   --    ALT 13  --   --    AST 10  --   --    BILITOT 0.6  --   --    < > = values in this interval not displayed.  Coagulation:     Recent Labs  Lab 07/06/18  1521   INR 0.9   APTT 23.9     LFTs:   Recent Labs  Lab 07/07/18 0432 07/09/18  0452   ALT 13  --   --    AST 10  --   --    ALKPHOS 77  --   --    BILITOT 0.6  --   --    PROT 6.7  --   --    ALBUMIN 3.3*  < > 3.5   < > = values in this  interval not displayed.  All labs within the past 24 hours have been reviewed.     Lab Results   Component Value Date    .0 (H) 10/30/2017    CALCIUM 10.5 07/09/2018    CAION 1.10 05/30/2016    PHOS 2.8 07/09/2018           Significant Imaging: reviewed

## 2018-07-09 NOTE — ANESTHESIA RELEASE NOTE
"Anesthesia Release from PACU Note    Patient: Lavelle Ladd    Procedure(s) Performed: Procedure(s) (LRB):  IRRIGATION AND DEBRIDEMENT (Left)    Anesthesia type: MAC    Post pain: Adequate analgesia    Post assessment: no apparent anesthetic complications, tolerated procedure well and no evidence of recall    Last Vitals:   Visit Vitals  BP (!) 108/59 (Patient Position: Lying)   Pulse 93   Temp 36.4 °C (97.5 °F) (Oral)   Resp 18   Ht 5' 11" (1.803 m)   Wt 101.4 kg (223 lb 8.7 oz)   SpO2 95%   BMI 31.18 kg/m²       Post vital signs: stable    Level of consciousness: awake, alert  and oriented    Nausea/Vomiting: no nausea/no vomiting    Complications: none    Airway Patency: patent    Respiratory: unassisted, spontaneous ventilation, room air    Cardiovascular: stable and blood pressure at baseline    Hydration: euvolemic  "

## 2018-07-09 NOTE — ANESTHESIA POSTPROCEDURE EVALUATION
"Anesthesia Post Evaluation    Patient: Lavelle Ladd    Procedure(s) Performed: Procedure(s) (LRB):  IRRIGATION AND DEBRIDEMENT (Left)    Final Anesthesia Type: MAC  Patient location during evaluation: PACU  Level of consciousness: awake and alert and oriented  Post-procedure vital signs: reviewed and stable  Pain management: adequate  Airway patency: patent  PONV status at discharge: No PONV  Anesthetic complications: no      Cardiovascular status: hemodynamically stable  Respiratory status: spontaneous ventilation  Hydration status: euvolemic  Follow-up not needed.        Visit Vitals  BP (!) 108/59 (Patient Position: Lying)   Pulse 93   Temp 36.4 °C (97.5 °F) (Oral)   Resp 18   Ht 5' 11" (1.803 m)   Wt 101.4 kg (223 lb 8.7 oz)   SpO2 95%   BMI 31.18 kg/m²       Pain/Shereen Score: Pain Assessment Performed: Yes (7/9/2018  1:36 PM)  Presence of Pain: complains of pain/discomfort (7/9/2018  1:36 PM)  Pain Rating Prior to Med Admin: 5 (7/9/2018  1:43 PM)  Pain Rating Post Med Admin: 6 (7/9/2018  6:33 AM)  Shereen Score: 10 (7/9/2018  9:30 AM)      "

## 2018-07-10 VITALS
RESPIRATION RATE: 18 BRPM | SYSTOLIC BLOOD PRESSURE: 186 MMHG | DIASTOLIC BLOOD PRESSURE: 90 MMHG | WEIGHT: 223.56 LBS | HEIGHT: 71 IN | HEART RATE: 92 BPM | TEMPERATURE: 98 F | OXYGEN SATURATION: 92 % | BODY MASS INDEX: 31.3 KG/M2

## 2018-07-10 LAB
ALBUMIN SERPL BCP-MCNC: 3.2 G/DL
ANION GAP SERPL CALC-SCNC: 11 MMOL/L
BASOPHILS # BLD AUTO: 0.01 K/UL
BASOPHILS NFR BLD: 0.2 %
BUN SERPL-MCNC: 21 MG/DL
CALCIUM SERPL-MCNC: 10.4 MG/DL
CHLORIDE SERPL-SCNC: 107 MMOL/L
CO2 SERPL-SCNC: 22 MMOL/L
CREAT SERPL-MCNC: 1.9 MG/DL
DIFFERENTIAL METHOD: NORMAL
EOSINOPHIL # BLD AUTO: 0.1 K/UL
EOSINOPHIL NFR BLD: 2.1 %
ERYTHROCYTE [DISTWIDTH] IN BLOOD BY AUTOMATED COUNT: 13.5 %
EST. GFR  (AFRICAN AMERICAN): 42 ML/MIN/1.73 M^2
EST. GFR  (NON AFRICAN AMERICAN): 36 ML/MIN/1.73 M^2
GLUCOSE SERPL-MCNC: 164 MG/DL
HCT VFR BLD AUTO: 45.2 %
HGB BLD-MCNC: 15 G/DL
LYMPHOCYTES # BLD AUTO: 1.5 K/UL
LYMPHOCYTES NFR BLD: 29.7 %
MCH RBC QN AUTO: 31 PG
MCHC RBC AUTO-ENTMCNC: 33.2 G/DL
MCV RBC AUTO: 93 FL
MONOCYTES # BLD AUTO: 0.6 K/UL
MONOCYTES NFR BLD: 10.8 %
NEUTROPHILS # BLD AUTO: 3 K/UL
NEUTROPHILS NFR BLD: 57.2 %
PHOSPHATE SERPL-MCNC: 2.5 MG/DL
PLATELET # BLD AUTO: 219 K/UL
PMV BLD AUTO: 10.1 FL
POCT GLUCOSE: 162 MG/DL (ref 70–110)
POCT GLUCOSE: 286 MG/DL (ref 70–110)
POTASSIUM SERPL-SCNC: 4 MMOL/L
RBC # BLD AUTO: 4.84 M/UL
SODIUM SERPL-SCNC: 140 MMOL/L
VANCOMYCIN SERPL-MCNC: 17.1 UG/ML
WBC # BLD AUTO: 5.19 K/UL

## 2018-07-10 PROCEDURE — 63600175 PHARM REV CODE 636 W HCPCS: Performed by: INTERNAL MEDICINE

## 2018-07-10 PROCEDURE — 25000003 PHARM REV CODE 250: Performed by: NURSE PRACTITIONER

## 2018-07-10 PROCEDURE — 63600175 PHARM REV CODE 636 W HCPCS: Performed by: NURSE PRACTITIONER

## 2018-07-10 PROCEDURE — 25000242 PHARM REV CODE 250 ALT 637 W/ HCPCS: Performed by: EMERGENCY MEDICINE

## 2018-07-10 PROCEDURE — 96372 THER/PROPH/DIAG INJ SC/IM: CPT

## 2018-07-10 PROCEDURE — 25000242 PHARM REV CODE 250 ALT 637 W/ HCPCS: Performed by: NURSE PRACTITIONER

## 2018-07-10 PROCEDURE — 80069 RENAL FUNCTION PANEL: CPT

## 2018-07-10 PROCEDURE — 85025 COMPLETE CBC W/AUTO DIFF WBC: CPT

## 2018-07-10 PROCEDURE — 99232 SBSQ HOSP IP/OBS MODERATE 35: CPT | Mod: ,,, | Performed by: PODIATRIST

## 2018-07-10 PROCEDURE — 97802 MEDICAL NUTRITION INDIV IN: CPT

## 2018-07-10 PROCEDURE — 80202 ASSAY OF VANCOMYCIN: CPT

## 2018-07-10 PROCEDURE — 94640 AIRWAY INHALATION TREATMENT: CPT

## 2018-07-10 PROCEDURE — 25000003 PHARM REV CODE 250: Performed by: INTERNAL MEDICINE

## 2018-07-10 PROCEDURE — 36415 COLL VENOUS BLD VENIPUNCTURE: CPT

## 2018-07-10 PROCEDURE — 99233 SBSQ HOSP IP/OBS HIGH 50: CPT | Mod: ,,, | Performed by: INTERNAL MEDICINE

## 2018-07-10 PROCEDURE — 99900035 HC TECH TIME PER 15 MIN (STAT)

## 2018-07-10 RX ORDER — AMLODIPINE BESYLATE 2.5 MG/1
2.5 TABLET ORAL DAILY
Status: DISCONTINUED | OUTPATIENT
Start: 2018-07-10 | End: 2018-07-10 | Stop reason: HOSPADM

## 2018-07-10 RX ORDER — HYDROCODONE BITARTRATE AND ACETAMINOPHEN 10; 325 MG/1; MG/1
1 TABLET ORAL EVERY 6 HOURS PRN
Qty: 20 TABLET | Refills: 0 | Status: ON HOLD | OUTPATIENT
Start: 2018-07-10 | End: 2018-09-25 | Stop reason: HOSPADM

## 2018-07-10 RX ORDER — LINEZOLID 600 MG/1
600 TABLET, FILM COATED ORAL EVERY 12 HOURS
Qty: 14 TABLET | Refills: 0 | Status: SHIPPED | OUTPATIENT
Start: 2018-07-10 | End: 2018-07-10

## 2018-07-10 RX ORDER — LINEZOLID 600 MG/1
600 TABLET, FILM COATED ORAL EVERY 12 HOURS
Qty: 14 TABLET | Refills: 0 | Status: SHIPPED | OUTPATIENT
Start: 2018-07-10 | End: 2018-07-17

## 2018-07-10 RX ADMIN — PREDNISONE 5 MG: 5 TABLET ORAL at 08:07

## 2018-07-10 RX ADMIN — TACROLIMUS 1.5 MG: 1 CAPSULE ORAL at 08:07

## 2018-07-10 RX ADMIN — HYDRALAZINE HYDROCHLORIDE 10 MG: 20 INJECTION INTRAMUSCULAR; INTRAVENOUS at 01:07

## 2018-07-10 RX ADMIN — MORPHINE SULFATE 2 MG: 4 INJECTION INTRAVENOUS at 07:07

## 2018-07-10 RX ADMIN — HYDROCODONE BITARTRATE AND ACETAMINOPHEN 1 TABLET: 10; 325 TABLET ORAL at 05:07

## 2018-07-10 RX ADMIN — ASPIRIN 81 MG: 81 TABLET, COATED ORAL at 08:07

## 2018-07-10 RX ADMIN — INSULIN ASPART 2 UNITS: 100 INJECTION, SOLUTION INTRAVENOUS; SUBCUTANEOUS at 05:07

## 2018-07-10 RX ADMIN — SODIUM BICARBONATE 2600 MG: 650 TABLET ORAL at 08:07

## 2018-07-10 RX ADMIN — VANCOMYCIN HYDROCHLORIDE 1250 MG: 10 INJECTION, POWDER, LYOPHILIZED, FOR SOLUTION INTRAVENOUS at 09:07

## 2018-07-10 RX ADMIN — PIPERACILLIN SODIUM AND TAZOBACTAM SODIUM 4.5 G: 4; .5 INJECTION, POWDER, LYOPHILIZED, FOR SOLUTION INTRAVENOUS at 01:07

## 2018-07-10 RX ADMIN — BUDESONIDE 0.5 MG: 0.5 SUSPENSION RESPIRATORY (INHALATION) at 07:07

## 2018-07-10 RX ADMIN — AMLODIPINE BESYLATE 2.5 MG: 2.5 TABLET ORAL at 01:07

## 2018-07-10 RX ADMIN — PIPERACILLIN SODIUM AND TAZOBACTAM SODIUM 4.5 G: 4; .5 INJECTION, POWDER, LYOPHILIZED, FOR SOLUTION INTRAVENOUS at 11:07

## 2018-07-10 RX ADMIN — ARFORMOTEROL TARTRATE 15 MCG: 15 SOLUTION RESPIRATORY (INHALATION) at 07:07

## 2018-07-10 RX ADMIN — INSULIN ASPART 6 UNITS: 100 INJECTION, SOLUTION INTRAVENOUS; SUBCUTANEOUS at 11:07

## 2018-07-10 NOTE — ASSESSMENT & PLAN NOTE
1. S/p DDRT in 2016 : stable allograft fn , Cr at baseline at 1.9 mg/dl,     cont Prograf 1.5 mg bid and Pred 5 mg daily ,      2. HTN : BP fluctuations noted,      3. Left toe gangrene,  Podiatry and vascular following , on broad spectrum abx , primary team to decide on discharge regimen , ? Zyvox ,      4. Metabolic acidosis : cont Bicarb supplements      5. Anemia of CKD : stable Hb     6. Ok d/c home and f/u in clinic in 2-3 weeks

## 2018-07-10 NOTE — DISCHARGE SUMMARY
[facility] Hospital Medicine  Discharge Summary      Patient Name: Lavelle Ladd  MRN: [id number]  Admission Date: 7/6/2018   Hospital Length of Stay: 4 days  Discharge Date and Time:  07/10/2018 1:33 PM  Attending Physician: Stephanie Mueller MD   Discharging Provider: Sheyla Robbins NP  Primary Care Provider: Rishabh Esteban MD      HPI:   Lavelle Ladd is a 62 year old male with type II diabetes mellitus, diabetic neuropathy, chronic back pain, right above the knee amputation and history of kidney transplant on 5/21/16, on Prograf and prednisone, who presents to the ED for an darkened wound on his left distal hallux. He describes increased pain and swelling to the left foot. He states the area of eschar has increased, the nail has fallen off and there is clear fluid drainage from the wound. He denies fever, chills.          Arterial US indicates normal velocities within the common femoral, superficial femoral, popliteal and deep profunda - no focal stenosis or occlusion.          Plain film shows a non healed fracture of the base of the proximal phalanx of the 2nd toe, no air in the soft tissues. CBC is unremarkable, CMP shows hyponatremia, CKD stage 3 (baseline creatinine 1.6 - 1.8), glucose 356, CRP 9.6, lactate 2.2. Pt was admitted in early June for toe infection, treated with ABX and underwent angiogram performed by Dr. Mcdonald.  (tibial and posterior tibial artery atherectomy with a balloon angioplasty and a left SFA atherectomy with balloon angioplasty). He was discharged on Keflex. Mr. Ladd returned to the ED on 6/29 for toe infection and prescribed Clindamycin.           Procedure(s) (LRB):  IRRIGATION AND DEBRIDEMENT (Left)      Hospital Course:   On 7/6 patient was admitted to Medicine secondary to Gangrene of Left great toe.  Xray of left foot showed no acute findings.  No change in the incompletely healed fracture of the base of the proximal phalanx of the 2nd toe.  Arterial  US showed diffuse atherosclerotic change of the left lower extremity arteries.  No findings suspicious for focal stenosis or occlusion.  He has a h/o PVD s/p Angiogram with balloon angioplasty and atherectomy to LLE per Dr. Mcdonald in June.  Podiatry consulted in the ER and per Dr. Magaña he will likely need amputation.  Vascular consult pending.  Started on Vanc and Zosyn.  Nephrology consulted given h/o kidney transplant.  BC continue to show NGTD.  As of 7/8 patient has been evaluated by vascular surgeon Dr. Uribe.  Per vascular, left foot arterial blood flow is adequate.  Ok for podiatry to proceed with OR for aggressive local debridement/amputation.  Reconsult if any further assistance is required.  Will d/w Dr. Magaña to determine POC moving forward.  Continue local care per podiatry recs for now.  As of 7/9 patient is s/p I and D per Dr. Magaña today.  Wound culture pending.  As of 7/10 patient evaluated by Dr. Guthrie with podiatry and cleared for DC from their standpoint.  Per podiatry patient will continue local wound care daily and was instructed to f/u with Dr. Magaña later this week for a wound re-check and verbalized + understanding.  Per podiatry, patient can complete 7 days of oral Zyvox to cover for MRSA.  BC and wound culture continues to show NGTD.  Case d/w Dr. Mueller.  Patient seen and examined and deemed medically stable to DC home today.  He was offered home health to continue wound care, but declined.  He will be instructed on daily wound care per nursing prior to DC.  Medications reconicled for DC home.  Also instructed to f/u with his PCP within 3 days and with Nephrology within 2-3 weeks and verbalized + understanding.       Consults:   Consults         Status Ordering Provider     Inpatient consult to Nephrology  Once     Provider:  Carlos Staley MD    Completed PATRICIA ALVAREZ     Inpatient consult to Podiatry  Once     Provider:  Katerina Magaña DPM    Completed PATRICIA ALVAREZ      Inpatient consult to Vascular Surgery  Once     Provider:  Horacio Uribe IV, MD    Completed SABA JAVIER JR     IP consult to case management  Once     Provider:  (Not yet assigned)    Completed KARINE OREILLY     Pharmacy to dose Vancomycin consult  Once     Provider:  (Not yet assigned)    Acknowledged SABA JAVIER JR     Pharmacy to dose Vancomycin consult  Once     Provider:  (Not yet assigned)    Acknowledged PATRICIA ALVAREZ          No new Assessment & Plan notes have been filed under this hospital service since the last note was generated.  Service: Hospital Medicine    Final Active Diagnoses:    Diagnosis Date Noted POA    PRINCIPAL PROBLEM:  Gangrene of toe of left foot [I96] 07/06/2018 Yes    Diabetic foot infection [E11.628, L08.9] 07/06/2018 Yes    Peripheral vascular disease [I73.9] 07/06/2018 Yes    Kidney transplant recipient [Z94.0] 04/07/2017 Not Applicable    Chronic kidney disease (CKD), stage III (moderate) [N18.3] 09/25/2016 Yes     Chronic    Type 2 diabetes mellitus with hyperglycemia, with long-term current use of insulin [E11.65, Z79.4]  Not Applicable    Chronic bilateral low back pain with left-sided sciatica [M54.42, G89.29] 01/25/2018 Yes    Long term current use of immunosuppressive drug [Z79.899] 07/06/2018 Not Applicable    Essential hypertension [I10] 02/20/2015 Yes      Problems Resolved During this Admission:    Diagnosis Date Noted Date Resolved POA       Discharged Condition: stable    Disposition: Home or Self Care    Follow Up:  Follow-up Information     Rishabh Esteban MD In 3 days.    Specialty:  Family Medicine  Why:  F/U with PCP within 3 days for post hospital evaluation  Contact information:  [address]  [facility] [address]  [phone]             Katerina Magaña DPM In 3 days.    Specialty:  Podiatry  Why:  F/U with Podiatry later this week for wound re-check, call to make appointment  Contact information:  [id number] [place] [address]  Dr [place] [address]  [phone]             Bud Tam MD In 2 weeks.    Specialty:  Nephrology  Why:  F/U with Nephrology within 2-3 weeks for routine monitoring  Contact information:  [address]  [place] [address]  [phone]                 Patient Instructions:     Notify your health care provider if you experience any of the following:  temperature >100.4     Notify your health care provider if you experience any of the following:  severe uncontrolled pain     Notify your health care provider if you experience any of the following:  redness, tenderness, or signs of infection (pain, swelling, redness, odor or green/yellow discharge around incision site)     Notify your health care provider if you experience any of the following:  worsening rash     Change dressing (specify)   Order Comments: Dressing change Daily: Flush left hallux with sterile saline. Apply wet to dry gauze to wound base. Cover with kerlix and paper tape.     Activity as tolerated         Significant Diagnostic Studies: Labs:   BMP:   Recent Labs  Lab 07/09/18  0452 07/10/18  0422   * 164*    140   K 3.9 4.0    107   CO2 22* 22*   BUN 21 21   CREATININE 1.9* 1.9*   CALCIUM 10.5 10.4    and CBC   Recent Labs  Lab 07/09/18 0452 07/10/18  0422   WBC 5.58 5.19   HGB 15.5 15.0   HCT 45.6 45.2    219     Microbiology:   Blood Culture   Lab Results   Component Value Date    LABBLOO No Growth to date 07/06/2018    LABBLOO No Growth to date 07/06/2018    LABBLOO No Growth to date 07/06/2018    LABBLOO No Growth to date 07/06/2018    and Wound Culture: negative    Pending Diagnostic Studies:     Procedure Component Value Units Date/Time    US Lower Extremity Arteries Left [[id number]] Resulted:  07/08/18 0946    Order Status:  Sent Lab Status:  No result Updated:  07/08/18 1154         Imaging Results          US Lower Extremity Arteries Left (Final result)  Result time 07/06/18 17:53:04    Final result by  Porter Young MD (07/06/18 17:53:04)                 Impression:      1. Diffuse atherosclerotic change of the left lower extremity arteries.  2. No findings suspicious for focal stenosis or occlusion.      Electronically signed by: Porter Young MD  Date:    07/06/2018  Time:    17:53             Narrative:    EXAMINATION:  US LOWER EXTREMITY ARTERIES LEFT    CLINICAL HISTORY:  LEFT leg infection; peripheral vascular disease.  History of previous left lower extremity angioplasty.    COMPARISON:  None    FINDINGS:  Left lower extremity arterial Doppler evaluation demonstrates diffuse atherosclerotic changes.  Velocities within the common femoral, superficial femoral, popliteal and deep profunda are within normal limits without evidence of focal stenosis or occlusion.  Anterior tibial, posterior tibial and dorsalis pedis arteries are patent with slightly diminished flow, but no evidence of focal stenosis or occlusion.                               X-Ray Foot Complete Left (Final result)  Result time 07/06/18 15:57:20    Final result by Porter Armas MD (07/06/18 15:57:20)                 Impression:      No acute findings.  No change in the incompletely healed fracture of the base of the proximal phalanx of the 2nd toe.      Electronically signed by: Porter Armas MD  Date:    07/06/2018  Time:    15:57             Narrative:    EXAMINATION:  XR FOOT COMPLETE 3 VIEW LEFT    CLINICAL HISTORY:  Local infection of the skin and subcutaneous tissue, unspecified    COMPARISON:  06/28/2018    FINDINGS:  There is unchanged non healed fracture of the base of the proximal phalanx of the 2nd toe.  No acute bony or joint abnormality is seen.  Extensive arterial calcifications.  No air in the soft tissues.                                Medications:  Reconciled Home Medications:      Medication List      START taking these medications    linezolid 600 mg Tab  Commonly known as:  ZYVOX  Take 1 tablet (600 mg  "total) by mouth every 12 (twelve) hours. for 7 days        CONTINUE taking these medications    amLODIPine 2.5 MG tablet  Commonly known as:  NORVASC  Take 1 tablet (2.5 mg total) by mouth once daily.     aspirin 81 MG EC tablet  Commonly known as:  ECOTRIN  Take 1 tablet (81 mg total) by mouth once daily.     BD INSULIN SYRINGE ULTRA-FINE 1 mL 31 gauge x 5/16 Syrg  Generic drug:  insulin syringe-needle U-100  USE ONE AS DIRECTED FOUR TIMES DAILY WITH MEALS AND AT BEDTIME     blood sugar diagnostic Strp  1 each by Misc.(Non-Drug; Combo Route) route 3 (three) times daily.     blood-glucose meter kit  Use as instructed     budesonide-formoterol 160-4.5 mcg 160-4.5 mcg/actuation Hfaa  Commonly known as:  SYMBICORT  Inhale 2 puffs into the lungs every 12 (twelve) hours. Wash out mouth after using     ergocalciferol 50,000 unit Cap  Commonly known as:  ERGOCALCIFEROL  Take 1 capsule (50,000 Units total) by mouth every 7 days. Take on Mondays     inhalation spacing device  Commonly known as:  BREATHERITE VALVED MDI CHAMBER  Use as directed for inhalation.     insulin  unit/mL injection  Commonly known as:  NovoLIN N NPH U-100 Insulin  Take 25 units subq with breakfast and 15 units at bedtime     lancets Misc  1 each by Misc.(Non-Drug; Combo Route) route 3 (three) times daily.     NovoLIN R Regular U-100 Insuln 100 unit/mL injection  Generic drug:  insulin regular  INJECT 6 UNITS WITH BREAKFAST AND 8 UNITS WITH DINNER, SUBCUTANEOUSLY 30 MIN BEFORE MEAL.     ondansetron 4 MG Tbdl  Commonly known as:  ZOFRAN-ODT  Take 1 tablet (4 mg total) by mouth every 8 (eight) hours as needed.     * pen needle, diabetic 32 gauge x 5/32" Ndle  Commonly known as:  EASY COMFORT PEN NEEDLES  Inject 1 each into the skin 3 (three) times daily.     * pen needle, diabetic 31 gauge x 1/4" Ndle  1 each by Misc.(Non-Drug; Combo Route) route 4 (four) times daily.     * BD ULTRA-FINE SHORT PEN NEEDLE 31 gauge x 5/16" Ndle  Generic drug:  pen " needle, diabetic     predniSONE 5 MG tablet  Commonly known as:  DELTASONE  Take 1 tablet (5 mg total) by mouth once daily.     sodium bicarbonate 650 MG tablet  Take 4 tablets (2,600 mg total) by mouth 2 (two) times daily.     tacrolimus 0.5 MG Cap  Commonly known as:  PROGRAF  Take 3 capsules (1.5 mg total) by mouth every 12 (twelve) hours. Z94.0 Kidney Transplant     zolpidem 5 MG Tab  Commonly known as:  AMBIEN  Take 2 tablets (10 mg total) by mouth nightly as needed.        * This list has 3 medication(s) that are the same as other medications prescribed for you. Read the directions carefully, and ask your doctor or other care provider to review them with you.                Indwelling Lines/Drains at time of discharge:   Lines/Drains/Airways          No matching active lines, drains, or airways          Time spent on the discharge of patient: 45 minutes  Patient was seen and examined on the date of discharge and determined to be suitable for discharge.         Sheyla Robbins DNP, ACNP-BC  Department of Hospital Medicine  [facility]

## 2018-07-10 NOTE — PHYSICIAN QUERY
"PT Name: Lavelle Ladd  MR #: [id number]     Physician Query Form - Documentation Clarification      CDS: Sheyla Kingston RN, CCDS         Contact information :[phone]  [email or web address]       This form is a permanent document in the medical record.     Query Date: July 10, 2018    By submitting this query, we are merely seeking further clarification of documentation. Please utilize your independent clinical judgment when addressing the question(s) below.    The Medical record reflects the following:    Supporting Clinical Findings Location in Medical Record     "Type 2 diabetes mellitus with diabetic neuropathy, with long-term current use of insulin   PVD (peripheral vascular disease)   Diabetic foot infection   Gangrene of toe of left foot"    "Ulcer noted to left distal hallux and dorsal hallux  with eschar base. Ulcer measurement (dorsal) 0.5cm x 0.5cm (distal) 1.5cm x 1cm.  The ulcer does not extend into deeper tissue and (--) sinus tracts exist."       Podiatry Consult 7/7/18                                                                                  Doctor, Please specify diagnosis or diagnoses associated with above clinical findings.  Please document depth of ulcer.  Provider Use Only      ___Skin breakdown only  ___Fat layer exposed  ___Muscle involvement without evidence of necrosis  ___Muscle necrosis  ___Bone involvement without evidence of necrosis  ___Bone necrosis  _x_other, skin and tissue necrosis                                                                                                               [  ] Clinically undetermined            "

## 2018-07-10 NOTE — PLAN OF CARE
Problem: Patient Care Overview  Goal: Plan of Care Review  Outcome: Ongoing (interventions implemented as appropriate)  Wound care performed to LLE as ordered, pt tolerated well. Pain adequately controlled. Pt refused a skin assessment to his back side the entire shift. Cbg corrected with SSI. Hydralazine administered once this shift as ordered, adequate relief obtained. Chart reviewed. Will monitor.

## 2018-07-10 NOTE — PLAN OF CARE
Problem: Patient Care Overview  Goal: Plan of Care Review  Outcome: Ongoing (interventions implemented as appropriate)  Recommendations     Recommendation/Intervention: 1.Add cardiac restriction to current diet. 2. Add beneprotein TID. 3. Patient was educated on diabetic diet. Pt verbalized understanding. All questions and concerns answered. 4. Will continue to monitor.   Goals: Meet > 85 % EEN/EPN while admitted  Nutrition Goal Status: new  Communication of RD Recs:  (POC, sticky note)

## 2018-07-10 NOTE — PROGRESS NOTES
[facility] Progress Notes:    Left toe gangrene  Estimated Creatinine Clearance: 47.6 mL/min (A) (based on SCr of 1.9 mg/dL (H)).  white blood cell count = 5.19  Tmax/24h = 98.1 F  Cultures -Blood -ngtd x 5 days/wound from lt toe -ngtd x 1 day    Serum creatinine stable x 2 days at 1.9  Patient to be discharged home this afternoon/evening per Podiatrist notes today/Primary team to decide on discharge regimen ? Zyvox per nephrology notes/Mari Amaya [facility] 7/10/2018 12:40 PM

## 2018-07-10 NOTE — SUBJECTIVE & OBJECTIVE
Interval History:  No acute events , s/p surgery for right toe gangrene,     Review of patient's allergies indicates: No Known Allergies      Current Facility-Administered Medications   Medication Frequency    0.9%  NaCl infusion Continuous    arformoterol nebulizer solution 15 mcg BID    aspirin EC tablet 81 mg Daily    budesonide nebulizer solution 0.5 mg BID    dextrose 50% injection 12.5 g PRN    dextrose 50% injection 25 g PRN    diphenhydrAMINE injection 25 mg Q6H PRN    glucagon (human recombinant) injection 1 mg PRN    glucose chewable tablet 16 g PRN    glucose chewable tablet 24 g PRN    hydrALAZINE injection 10 mg Q6H PRN    HYDROcodone-acetaminophen  mg per tablet 1 tablet Q6H PRN    HYDROcodone-acetaminophen 5-325 mg per tablet 1 tablet Q6H PRN    insulin aspart U-100 pen 1-10 Units QID (AC + HS) PRN    insulin detemir U-100 pen 10 Units QHS    morphine injection 2 mg Q4H PRN    nozaseptin (NOZIN) nasal  BID    ondansetron injection 4 mg Q8H PRN    piperacillin-tazobactam 4.5 g in dextrose 5 % 100 mL IVPB (ready to mix system) Q8H    predniSONE tablet 5 mg Daily    sodium bicarbonate tablet 2,600 mg BID    sodium chloride 0.9% flush 3 mL PRN    tacrolimus capsule 1.5 mg BID    vancomycin (VANCOCIN) 1,250 mg in dextrose 5 % 250 mL IVPB Q48H       Objective:     Vital Signs (Most Recent):  Temp: 97.6 °F (36.4 °C) (07/10/18 0728)  Pulse: 74 (07/10/18 0735)  Resp: 16 (07/10/18 0735)  BP: (!) 179/86 (07/10/18 0728)  SpO2: 95 % (07/10/18 0735)  O2 Device (Oxygen Therapy): room air (07/10/18 0732) Vital Signs (24h Range):  Temp:  [97.6 °F (36.4 °C)-98.1 °F (36.7 °C)] 97.6 °F (36.4 °C)  Pulse:  [74-89] 74  Resp:  [16-18] 16  SpO2:  [93 %-97 %] 95 %  BP: (123-196)/(74-94) 179/86     Weight: 101.4 kg (223 lb 8.7 oz) (07/06/18 1357)  Body mass index is 31.18 kg/m².  Body surface area is 2.25 meters squared.    I/O last 3 completed shifts:  In: 3873.3 [P.O.:1160; I.V.:2063.3;  IV Piggyback:650]  Out: -     Physical Exam   Constitutional: He is oriented to person, place, and time. He appears well-developed and well-nourished. No distress.   HENT:   Head: Normocephalic and atraumatic.   Eyes: Pupils are equal, round, and reactive to light.   Neck: Normal range of motion. Neck supple. No tracheal deviation present. No thyromegaly present.   Cardiovascular: Normal rate, regular rhythm, normal heart sounds and intact distal pulses.  Exam reveals no gallop and no friction rub.    No murmur heard.  Pulmonary/Chest: Effort normal and breath sounds normal. He has no wheezes. He has no rales.   Abdominal: Soft. He exhibits no mass. There is no tenderness. There is no rebound and no guarding.   No allograft tenderness    Musculoskeletal: Normal range of motion. He exhibits no edema.   S/p right BKA , left great toe with partial gangrene   Lymphadenopathy:     He has no cervical adenopathy.   Neurological: He is alert and oriented to person, place, and time.   Skin: Skin is warm. No rash noted. He is not diaphoretic. No erythema.   Nursing note and vitals reviewed.      Significant Labs:    CBC:     Recent Labs  Lab 07/10/18  0422   WBC 5.19   RBC 4.84   HGB 15.0   HCT 45.2      MCV 93   MCH 31.0   MCHC 33.2     CMP:   Recent Labs  Lab 07/07/18  0432  07/10/18  0422   *  < > 164*   CALCIUM 9.4  < > 10.4   ALBUMIN 3.3*  < > 3.2*   PROT 6.7  --   --      < > 140   K 4.1  < > 4.0   CO2 22*  < > 22*     < > 107   BUN 22  < > 21   CREATININE 1.7*  < > 1.9*   ALKPHOS 77  --   --    ALT 13  --   --    AST 10  --   --    BILITOT 0.6  --   --    < > = values in this interval not displayed.  Coagulation:     Recent Labs  Lab 07/06/18  1521   INR 0.9   APTT 23.9     LFTs:   Recent Labs  Lab 07/07/18 0432  07/10/18  0422   ALT 13  --   --    AST 10  --   --    ALKPHOS 77  --   --    BILITOT 0.6  --   --    PROT 6.7  --   --    ALBUMIN 3.3*  < > 3.2*   < > = values in this interval not  displayed.  All labs within the past 24 hours have been reviewed.     Lab Results   Component Value Date    .0 (H) 10/30/2017    CALCIUM 10.4 07/10/2018    CAION 1.10 05/30/2016    PHOS 2.5 (L) 07/10/2018           Significant Imaging: reviewed

## 2018-07-10 NOTE — PROGRESS NOTES
[facility] -   Nephrology  Progress Note    Patient Name: Lavelle Ladd  MRN: [id number]  Admission Date: 2018  Hospital Length of Stay: 4 days  Attending Provider: Stephanie Mueller MD   Primary Care Physician: Rishabh Esteban MD  Principal Problem:Gangrene of toe of left foot    Subjective:     HPI: Lavelle Ladd is a pleasant 62 y old  man  who received a  - ( brain death ) kidney transplant on 16.  He has CKD stage 3 - GFR 30-59 and his baseline creatinine is between 1.6-1.8. He takes  prednisone and tacrolimus for maintenance immunosuppression.  MMF was stopped few months ago , s/p right BKA in  , has dry gangrene on his left great toe for few days, now developed purulent discharge, admitted for management,  Podiatry consulted , renal fn at his baseline , several admissions in the past few months for same reason ,     Interval History:  No acute events , s/p surgery for right toe gangrene,     Review of patient's allergies indicates: No Known Allergies      Current Facility-Administered Medications   Medication Frequency    0.9%  NaCl infusion Continuous    arformoterol nebulizer solution 15 mcg BID    aspirin EC tablet 81 mg Daily    budesonide nebulizer solution 0.5 mg BID    dextrose 50% injection 12.5 g PRN    dextrose 50% injection 25 g PRN    diphenhydrAMINE injection 25 mg Q6H PRN    glucagon (human recombinant) injection 1 mg PRN    glucose chewable tablet 16 g PRN    glucose chewable tablet 24 g PRN    hydrALAZINE injection 10 mg Q6H PRN    HYDROcodone-acetaminophen  mg per tablet 1 tablet Q6H PRN    HYDROcodone-acetaminophen 5-325 mg per tablet 1 tablet Q6H PRN    insulin aspart U-100 pen 1-10 Units QID (AC + HS) PRN    insulin detemir U-100 pen 10 Units QHS    morphine injection 2 mg Q4H PRN    nozaseptin (NOZIN) nasal  BID    ondansetron injection 4 mg Q8H PRN    piperacillin-tazobactam 4.5 g in dextrose 5 % 100 mL  IVPB (ready to mix system) Q8H    predniSONE tablet 5 mg Daily    sodium bicarbonate tablet 2,600 mg BID    sodium chloride 0.9% flush 3 mL PRN    tacrolimus capsule 1.5 mg BID    vancomycin (VANCOCIN) 1,250 mg in dextrose 5 % 250 mL IVPB Q48H       Objective:     Vital Signs (Most Recent):  Temp: 97.6 °F (36.4 °C) (07/10/18 0728)  Pulse: 74 (07/10/18 0735)  Resp: 16 (07/10/18 0735)  BP: (!) 179/86 (07/10/18 0728)  SpO2: 95 % (07/10/18 0735)  O2 Device (Oxygen Therapy): room air (07/10/18 0732) Vital Signs (24h Range):  Temp:  [97.6 °F (36.4 °C)-98.1 °F (36.7 °C)] 97.6 °F (36.4 °C)  Pulse:  [74-89] 74  Resp:  [16-18] 16  SpO2:  [93 %-97 %] 95 %  BP: (123-196)/(74-94) 179/86     Weight: 101.4 kg (223 lb 8.7 oz) (07/06/18 1357)  Body mass index is 31.18 kg/m².  Body surface area is 2.25 meters squared.    I/O last 3 completed shifts:  In: 3873.3 [P.O.:1160; I.V.:2063.3; IV Piggyback:650]  Out: -     Physical Exam   Constitutional: He is oriented to person, place, and time. He appears well-developed and well-nourished. No distress.   HENT:   Head: Normocephalic and atraumatic.   Eyes: Pupils are equal, round, and reactive to light.   Neck: Normal range of motion. Neck supple. No tracheal deviation present. No thyromegaly present.   Cardiovascular: Normal rate, regular rhythm, normal heart sounds and intact distal pulses.  Exam reveals no gallop and no friction rub.    No murmur heard.  Pulmonary/Chest: Effort normal and breath sounds normal. He has no wheezes. He has no rales.   Abdominal: Soft. He exhibits no mass. There is no tenderness. There is no rebound and no guarding.   No allograft tenderness    Musculoskeletal: Normal range of motion. He exhibits no edema.   S/p right BKA , left great toe with partial gangrene   Lymphadenopathy:     He has no cervical adenopathy.   Neurological: He is alert and oriented to person, place, and time.   Skin: Skin is warm. No rash noted. He is not diaphoretic. No erythema.    Nursing note and vitals reviewed.      Significant Labs:    CBC:     Recent Labs  Lab 07/10/18  0422   WBC 5.19   RBC 4.84   HGB 15.0   HCT 45.2      MCV 93   MCH 31.0   MCHC 33.2     CMP:   Recent Labs  Lab 07/07/18  0432  07/10/18  0422   *  < > 164*   CALCIUM 9.4  < > 10.4   ALBUMIN 3.3*  < > 3.2*   PROT 6.7  --   --      < > 140   K 4.1  < > 4.0   CO2 22*  < > 22*     < > 107   BUN 22  < > 21   CREATININE 1.7*  < > 1.9*   ALKPHOS 77  --   --    ALT 13  --   --    AST 10  --   --    BILITOT 0.6  --   --    < > = values in this interval not displayed.  Coagulation:     Recent Labs  Lab 07/06/18  1521   INR 0.9   APTT 23.9     LFTs:   Recent Labs  Lab 07/07/18  0432  07/10/18  0422   ALT 13  --   --    AST 10  --   --    ALKPHOS 77  --   --    BILITOT 0.6  --   --    PROT 6.7  --   --    ALBUMIN 3.3*  < > 3.2*   < > = values in this interval not displayed.  All labs within the past 24 hours have been reviewed.     Lab Results   Component Value Date    .0 (H) 10/30/2017    CALCIUM 10.4 07/10/2018    CAION 1.10 05/30/2016    PHOS 2.5 (L) 07/10/2018           Significant Imaging: reviewed     Assessment/Plan:     Kidney transplant recipient      1. S/p DDRT in 2016 : stable allograft fn , Cr at baseline at 1.9 mg/dl,     cont Prograf 1.5 mg bid and Pred 5 mg daily ,      2. HTN : BP fluctuations noted,      3. Left toe gangrene,  Podiatry and vascular following , on broad spectrum abx , primary team to decide on discharge regimen , ? Zyvox ,      4. Metabolic acidosis : cont Bicarb supplements      5. Anemia of CKD : stable Hb     6. Ok d/c home and f/u in clinic in 2-3 weeks                  I will follow-up with patient. Please contact us if you have any additional questions.     Total time spent 40 minutes including time needed to review the records,  patient  evaluation, documentation, face-to-face discussion with the patient,  primary team ,   more than 50% of the time was spent  on coordination of care and counseling.       Carlos Staley MD  Nephrology  [facility] [place]

## 2018-07-10 NOTE — PROGRESS NOTES
"Patient refused to let MAYKEL Floyd change his dressing for patient educational purposes. Patient states, "I've seen y'all change it a bunch of times. I know how to do it." Patient educated verbally on how to change the dressing, verbalized understanding. Saline, gauze, kerlex, and paper tape given to patient. Surgical shoe placed on patient, instructed to wear at all times. Verbalized understanding. Waiting on medications from pharmacy.   "

## 2018-07-10 NOTE — DISCHARGE INSTRUCTIONS
Linezolid tablets  What is this medicine?  LINEZOLID (li NE zoh lid) is an oxazolidinone antibiotic. It is used to treat certain kinds of bacterial infections. It will not work for colds, flu, or other viral infections.  How should I use this medicine?  Take this medicine by mouth with a glass of water. Follow the directions on the prescription label. Take with food or on an empty stomach. Take your medicine at regular intervals. Do not take your medicine more often than directed. Take all of your medicine as directed even if you think your are better. Do not skip doses or stop your medicine early.  Talk to your pediatrician regarding the use of this medicine in children. While this drug may be prescribed for selected conditions, precautions do apply.  What side effects may I notice from receiving this medicine?  Side effects that you should report to your doctor or health care professional as soon as possible:  · allergic reactions like skin rash, itching or hives, swelling of the face, lips, or tongue  · breathing problems  · burning, numbness, or tingling  · changes in vision  · confused, restless  · discolored, sore mouth  · fever  · irregular heart beat, blood pressure  · seizures  · tremor, trouble walking  · unusual bleeding or bruising  · unusually weak or tired  Side effects that usually do not require medical attention (report to your doctor or health care professional if they continue or are bothersome):  · changes in taste  · constipation or diarrhea  · dizzy  · headache  · nausea, vomiting  · stomach upset  · trouble sleeping  · vaginal itch, irritation  What may interact with this medicine?  Do not take this medicine with any of the following medications:  · bupropion  · certain medicines for depression, anxiety, or psychotic disturbances  · doxepin  · fluoxetine  · furazolidone  · green tea  · MAOIs like Carbex, Eldepryl, Marplan, Nardil, and  Parnate  · milnacipran  · procarbazine  · rasagiline  · selegiline  · Danny's wort  · tryptophan  This medicine may also interact with the following medications:  · birth control pills  · medicines for allergies or colds like phenylpropanolamine, pseudoephedrine  · medicines for blood pressure  · stimulant medicines for attention disorders, weight loss, or to stay awake  What if I miss a dose?  If you miss a dose, take it as soon as you can. If it is almost time for your next dose, take only that dose. Do not take double or extra doses.  Where should I keep my medicine?  Keep out of the reach of children.  Store at room temperature between 15 and 30 degrees C (59 and 86 degrees F). Keep container tightly closed to protect from light and moisture. Throw away any unused medicine after the expiration date.  What should I tell my health care provider before I take this medicine?  They need to know if you have any of these conditions:  · cancer  · diabetes  · heart disease  · high blood pressure  · kidney disease  · pheochromocytoma  · untreated thyroid disease  · an unusual or allergic reaction to linezolid, other antibiotics or medicines, foods, dyes, or preservatives  · pregnant or trying to get pregnant  · breast-feeding  What should I watch for while using this medicine?  Tell your doctor or health care professional if your symptoms do not begin to improve or if you get new symptoms.  You will need to be on a special diet while taking this medicine. Ask your doctor or health care professional for a list of foods that you should try to avoid. This includes, but is not limited to, smoked or processed meats, aged cheeses, soy sauce, red janak and beer.  Do not treat diarrhea with over-the-counter products. Contact your doctor if you have diarrhea that lasts more than 2 days or if the diarrhea is severe and watery.  NOTE:This sheet is a summary. It may not cover all possible information. If you have questions about  this medicine, talk to your doctor, pharmacist, or health care provider. Copyright© 2017 [employer]          Cleanse left great toe with saline. Apply gauze saturated with saline to great toe. Cover wet gauze with dry gauze. Wrap foot with kerlex. Secure with paper tape. Change dressing twice a day and when dressing is dirty.

## 2018-07-10 NOTE — PROGRESS NOTES
[facility] -   Adult Nutrition  Progress Note    SUMMARY     Recommendations    Recommendation/Intervention: 1.Add cardiac restriction to current diet. 2. Add beneprotein TID. 3. Patient was educated on diabetic diet. Pt verbalized understanding. All questions and concerns answered. 4. Will continue to monitor.   Goals: Meet > 85 % EEN/EPN while admitted  Nutrition Goal Status: new  Communication of RD Recs:  (POC, sticky note)    Reason for Assessment    Reason for Assessment: identified at risk by screening criteria  Dx:.  1. Gangrene    2. Left foot infection    3. Long term current use of immunosuppressive drug    4. Kidney transplant recipient    5. Type 2 diabetes mellitus with diabetic neuropathy, with long-term current use of insulin    6. PVD (peripheral vascular disease)    7. Diabetic foot infection    8. Gangrene of toe of left foot      Past Medical History:   Diagnosis Date    DINORAH (acute kidney injury) 2016    Arthritis     Chronic obstructive pulmonary disease 2016    Coronary artery disease involving native coronary artery of native heart without angina pectoris 2016     donor kidney transplant for DM 16     Induction with Thymo x3 and IV solumedrol to total 875mg  Kidney Biopsy  2016: 16 glomeruli, ACR type 1 AVR type 2, significant microcirculatory changes, c4d negative, No DSA, 5 to10% fibrosis. Treated with thymo x8 2016- no rejection      Diastolic heart failure     Encounter for blood transfusion     ESRD on RRT since 10/2013 10/29/2013    Biopsy proven diabetic nephropathy and lymphoplasmacytic interstitial infiltrate not c/w with AIN (ddx sjogrens or assoc with tamm-horsefall protein extravasation)     GERD (gastroesophageal reflux disease)     History of hepatitis C, s/p successful Rx w/ SVR12 - 2017    Completed 12 weeks harvoni w/ SVR    Hyperlipidemia     Hypertension     Prophylactic immunotherapy      "Proteinuria     PVD (peripheral vascular disease) 6/26/2017    RLE BKA CT 12/11/16 Extensive atherosclerotic disease of the aorta and mesenteric arteries.     Renal hypertension     Type 2 diabetes mellitus with diabetic neuropathy, with long-term current use of insulin 12/1/2016    Vitamin B12 deficiency        General Information Comments: Presented to the ED x darkened wound on his left distal hallux. Right BKA. Patient is s/p excision of wound debridement of left great toe.  Patient currently on diabetic diet, tolerating. Patient reports good appetite( PO intake today ~ 100 %). Patient states he does not follow a diabetic diet at home. Patient was educated on diabetic diet.   Nutrition Discharge Planning: Diabetic cardiac diet    Nutrition Risk Screen    Nutrition Risk Screen: large or nonhealing wound, burn or pressure ulcer    Nutrition/Diet History    Do you have any cultural, spiritual, Cheondoism conflicts, given your current situation?: na    Anthropometrics    Temp: 97.7 °F (36.5 °C)  Height Method: Stated  Height: 5' 11" (180.3 cm)  Height (inches): 71 in  Weight Method: Standard Scale  Weight: 101.4 kg (223 lb 8.7 oz)  Weight (lb): 223.55 lb  Ideal Body Weight (IBW), Male: 172 lb  % Ideal Body Weight, Male (lb): 129.97 lb  BMI (Calculated): 31.2  BMI Grade: 30 - 34.9- obesity - grade I  Amputation %: 5.9  Total Amputation %: 5.9  Amputation Ideal Body Weight (IBW), Male (lb): 166.1 lb  Amputee BMI (kg/m2): 33.22 kg/m2       Lab/Procedures/Meds    Pertinent Labs Reviewed: reviewed  .[email or web address]  BMP  Lab Results   Component Value Date     07/10/2018    K 4.0 07/10/2018     07/10/2018    CO2 22 (L) 07/10/2018    BUN 21 07/10/2018    CREATININE 1.9 (H) 07/10/2018    CALCIUM 10.4 07/10/2018    ANIONGAP 11 07/10/2018    ESTGFRAFRICA 42 (A) 07/10/2018    EGFRNONAA 36 (A) 07/10/2018     Lab Results   Component Value Date    CALCIUM 10.4 07/10/2018    PHOS 2.5 (L) 07/10/2018       Lab Results "   Component Value Date    ALBUMIN 3.2 (L) 07/10/2018       Recent Labs  Lab 07/10/18  1110   POCTGLUCOSE 286*       Lab Results   Component Value Date    HGBA1C 10.2 (H) 07/07/2018       Pertinent Medications Reviewed: reviewed    Physical Findings/Assessment    Overall Physical Appearance: obese  Oral/Mouth Cavity: tooth/teeth missing  Skin:  (incision site foot, wound toe )    Estimated/Assessed Needs    Weight Used For Calorie Calculations: 101.4 kg (223 lb 8.7 oz)  Energy Calorie Requirements (kcal): 2191  Energy Need Method: [place]-St Jeor (x1.2 )  Protein Requirements: 121 g   Weight Used For Protein Calculations: 101.4 kg (223 lb 8.7 oz)     Fluid Need Method: RDA Method (or per MD)  RDA Method (mL): 2191  CHO Requirement: 50 % EEN      Nutrition Prescription Ordered    Current Diet Order: Diabetic 2000 kcal    Evaluation of Received Nutrient/Fluid Intake         Intake/Output Summary (Last 24 hours) at 07/10/18 1304  Last data filed at 07/10/18 0902   Gross per 24 hour   Intake          2039.16 ml   Output                0 ml   Net          2039.16 ml       % Intake of Estimated Energy Needs: 75 - 100 %  % Meal Intake: 75 - 100 %    Nutrition Risk      1xweekly    Assessment and Plan    Nutrition Problem  Excessive carbohydrate intake     Related to (etiology):   undesirable food choices    Signs and Symptoms (as evidenced by):   Lab Results   Component Value Date    HGBA1C 10.2 (H) 07/07/2018         Interventions/Recommendations (treatment strategy):  See above     Nutrition Diagnosis Status:   New      Nutrition Problem  Increased nutrient needs (protein)    Related to (etiology):   Wound healing     Signs and Symptoms (as evidenced by):   EPN    Interventions/Recommendations (treatment strategy):  See above    Nutrition Diagnosis Status:   New        Monitor and Evaluation    Food and Nutrient Intake: energy intake, food and beverage intake  Food and Nutrient Adminstration: diet order  Anthropometric  Measurements: weight  Biochemical Data, Medical Tests and Procedures: electrolyte and renal panel, glucose/endocrine profile  Nutrition-Focused Physical Findings: overall appearance, skin     Nutrition Follow-Up    RD Follow-up?: Yes

## 2018-07-10 NOTE — PLAN OF CARE
Patient refused to sign - ready to go home     07/10/18 1539   Medicare Message   Important Message from Medicare regarding Discharge Appeal Rights Given to patient/caregiver;Explained to patient/caregiver;Signed/date by patient/caregiver   Date IMM was signed 07/10/18

## 2018-07-10 NOTE — PROGRESS NOTES
[facility]  Podiatry  Progress Note    Patient Name: Lavelle Ladd  MRN: [id number]  Admission Date: 7/6/2018  Hospital Length of Stay: 4 days  Attending Physician: Stephanie Mueller MD  Primary Care Provider: Rishabh Esteban MD     Subjective:     Interval History:  Patient is status post 07/09/2018 excision of wound debridement of left great toe due to ulceration which was secondary to trauma as result of self nail debridement in the setting of DMII with PVD and Peripheral Neuropathy.  Patient denies pain to the extremity at this time.  He does have a contralateral below-knee amputation.  States that limit weight-bearing ambulation.  No GI distress as stated.  Anxious and eager to be discharged home.  States that he had to replace the tape distressing several times due to wait dislodging.  His covering nurse has no other information for me.  No overnight complaints are stated in relation to the foot. Vancomycin intravenous is currently running at bedside.    Follow-up For: Procedure(s) (LRB):  IRRIGATION AND DEBRIDEMENT (Left)    Post-Operative Day: 1 Day Post-Op    Scheduled Meds:   arformoterol  15 mcg Nebulization BID    aspirin  81 mg Oral Daily    budesonide  0.5 mg Nebulization BID    insulin detemir U-100  10 Units Subcutaneous QHS    nozaseptin   Each Nare BID    piperacillin-tazobactam (ZOSYN) IVPB  4.5 g Intravenous Q8H    predniSONE  5 mg Oral Daily    sodium bicarbonate  2,600 mg Oral BID    tacrolimus  1.5 mg Oral BID    vancomycin (VANCOCIN) IVPB  1,250 mg Intravenous Q48H     Continuous Infusions:   sodium chloride 0.9% 50 mL/hr at 07/08/18 1623     PRN Meds:dextrose 50%, dextrose 50%, diphenhydrAMINE, glucagon (human recombinant), glucose, glucose, hydrALAZINE, HYDROcodone-acetaminophen, HYDROcodone-acetaminophen, insulin aspart U-100, morphine, ondansetron, sodium chloride 0.9%    Review of Systems   Constitutional: Positive for activity change. Negative for appetite  change, chills, diaphoresis, fatigue and fever.   HENT: Negative for congestion, ear pain, facial swelling, hearing loss, nosebleeds, sneezing, sore throat and trouble swallowing.    Eyes: Positive for photophobia. Negative for pain, redness and itching.   Respiratory: Negative for apnea, cough and chest tightness.    Cardiovascular: Positive for leg swelling. Negative for palpitations.   Gastrointestinal: Negative for abdominal pain, diarrhea and nausea.   Endocrine: Negative for cold intolerance, heat intolerance, polydipsia, polyphagia and polyuria.   Genitourinary: Negative for difficulty urinating.   Musculoskeletal: Positive for joint swelling. Negative for arthralgias, myalgias and neck pain.   Skin: Positive for color change and wound. Negative for pallor and rash.   Neurological: Positive for numbness. Negative for dizziness, seizures, facial asymmetry and headaches.   Psychiatric/Behavioral: Negative for agitation, behavioral problems and confusion.     Objective:     Vital Signs (Most Recent):  Temp: 97.6 °F (36.4 °C) (07/10/18 0728)  Pulse: 74 (07/10/18 0735)  Resp: 16 (07/10/18 0735)  BP: (!) 179/86 (07/10/18 0728)  SpO2: 95 % (07/10/18 0735) Vital Signs (24h Range):  Temp:  [97.5 °F (36.4 °C)-98.1 °F (36.7 °C)] 97.6 °F (36.4 °C)  Pulse:  [74-93] 74  Resp:  [16-18] 16  SpO2:  [93 %-97 %] 95 %  BP: (108-196)/(59-94) 179/86     Weight: 101.4 kg (223 lb 8.7 oz)  Body mass index is 31.18 kg/m².    Foot Exam    General  General Appearance: appears stated age and healthy   Orientation: alert and oriented to person, place, and time   Affect: appropriate   Assistance: (Right lower extremity below knee amputation)      Left Foot/Ankle      Inspection and Palpation  Ecchymosis: none  Tenderness: none   Swelling: (Minimal edema, distal left forefoot, particularly the great toe.)  Skin Exam: (Ulceration, subcutaneous tissues, fascia exposure, distal medial aspect of the left great toe in the area of the status post  wound debridement.  No malodor is noted. No drainage is noted. Minimal periwound erythema is noted. No cellulitis noted.  )    Neurovascular  Dorsalis pedis: 1+  Posterior tibial: absent  Saphenous nerve sensation: absent  Tibial nerve sensation: absent  Superficial peroneal nerve sensation: absent  Deep peroneal nerve sensation: absent  Sural nerve sensation: absent    Muscle Strength  Ankle dorsiflexion: 5  Ankle plantar flexion: 5  Ankle inversion: 5  Great toe extension: 4+  Great toe flexion: 4+    Comments  Fibro granular wound base is noted to the distal medial aspect of the left great toe in the area status post excision wound debridement site.  No malodor is appreciated.  No sinus tract noted. No probe to bone or osseous exposure is noted.  The wound measures approximately 2.25 x 2.1 cm and length and width with a depth of approximately 2 mm.  No signs of localized infection.      Laboratory:  A1C:   Recent Labs  Lab 06/12/18  0901 07/07/18  0432   HGBA1C 9.9* 10.2*     BMP:   Recent Labs  Lab 07/10/18  0422   *      K 4.0      CO2 22*   BUN 21   CREATININE 1.9*   CALCIUM 10.4     Cardiac Markers: No results for input(s): CKMB, TROPONINT, MYOGLOBIN in the last 168 hours.  CBC:   Recent Labs  Lab 07/10/18  0422   WBC 5.19   RBC 4.84   HGB 15.0   HCT 45.2      MCV 93   MCH 31.0   MCHC 33.2     CMP:   Recent Labs  Lab 07/07/18  0432  07/10/18  0422   *  < > 164*   CALCIUM 9.4  < > 10.4   ALBUMIN 3.3*  < > 3.2*   PROT 6.7  --   --      < > 140   K 4.1  < > 4.0   CO2 22*  < > 22*     < > 107   BUN 22  < > 21   CREATININE 1.7*  < > 1.9*   ALKPHOS 77  --   --    ALT 13  --   --    AST 10  --   --    BILITOT 0.6  --   --    < > = values in this interval not displayed.  Coagulation:   Recent Labs  Lab 07/06/18  1521   INR 0.9   APTT 23.9     CRP:   Recent Labs  Lab 07/06/18  1521   CRP 9.6*     ESR:   Recent Labs  Lab 07/06/18  1521   SEDRATE 18*     LFTs:   Recent  Labs  Lab 07/07/18  0432  07/10/18  0422   ALT 13  --   --    AST 10  --   --    ALKPHOS 77  --   --    BILITOT 0.6  --   --    PROT 6.7  --   --    ALBUMIN 3.3*  < > 3.2*   < > = values in this interval not displayed.  Prealbumin: No results for input(s): PREALBUMIN in the last 48 hours.  Wound Cultures:   Recent Labs  Lab 06/12/18 0912 07/09/18 0856   LABAERO Skin skylar,  no predominant organism No growth     Microbiology Results (last 7 days)     Procedure Component Value Units Date/Time    Aerobic culture [[id number]] Collected:  07/09/18 0856    Order Status:  Completed Specimen:  Wound from Toe, Left Foot Updated:  07/10/18 0854     Aerobic Bacterial Culture No growth    Narrative:       Left great toe    Culture, Anaerobe [[id number]] Collected:  07/09/18 0856    Order Status:  Completed Specimen:  Wound from Toe, Left Foot Updated:  07/10/18 0724     Anaerobic Culture Culture in progress    Narrative:       Great toe    Blood culture [[id number]] Collected:  07/06/18 1800    Order Status:  Completed Specimen:  Blood from Peripheral, Forearm, Right Updated:  07/10/18 0612     Blood Culture, Routine No Growth to date     Blood Culture, Routine No Growth to date     Blood Culture, Routine No Growth to date     Blood Culture, Routine No Growth to date    Blood Culture #2 **CANNOT BE ORDERED STAT** [[id number]] Collected:  07/06/18 1837    Order Status:  Completed Specimen:  Blood from Peripheral, Hand, Right Updated:  07/10/18 0612     Blood Culture, Routine No Growth to date     Blood Culture, Routine No Growth to date     Blood Culture, Routine No Growth to date     Blood Culture, Routine No Growth to date        Specimen (12h ago through future)    None          Diagnostic Results:  X-Ray: I have reviewed all pertinent results/findings within the past 24 hours.  Done.    Clinical Findings:  There was no tenderness to palpation in the area of pathology at the s/p excision wound debridement site.  No local signs  of infection noted.     Assessment/Plan:     Active Diagnoses:    Diagnosis Date Noted POA    PRINCIPAL PROBLEM:  Gangrene of toe of left foot [I96] 07/06/2018 Yes    Long term current use of immunosuppressive drug [Z79.899] 07/06/2018 Not Applicable    Peripheral vascular disease [I73.9] 07/06/2018 Yes    Diabetic foot infection [E11.628, L08.9] 07/06/2018 Yes    Chronic bilateral low back pain with left-sided sciatica [M54.42, G89.29] 01/25/2018 Yes    Kidney transplant recipient [Z94.0] 04/07/2017 Not Applicable    Chronic kidney disease (CKD), stage III (moderate) [N18.3] 09/25/2016 Yes     Chronic    Type 2 diabetes mellitus with hyperglycemia, with long-term current use of insulin [E11.65, Z79.4]  Not Applicable    Essential hypertension [I10] 02/20/2015 Yes      Problems Resolved During this Admission:    Diagnosis Date Noted Date Resolved POA       Dx: DMII with Peripheral Neuropathy, DMII with RLE BKA, DMII with PVD, DMII with Left Foot Ulceration.  The patient is status post irrigation with excisional wound debridement on last evening.  At this time, there are no signs of infection.  Hospitalist management for comorbid state.  Patient be discharged home this afternoon/evening.  Intravenous antibiotics as per primary team and Infectious Disease consultants.  Patient may follow up with Dr. Katerina Magaña DPM this week at the [facility]. DSD is applied (wet-2-dry).  Please limit weight-bearing ambulation.  Surgical shoe to the left lower extremity at all times.  Patient may continue daily wet to dry dressings until follow-up.  Please establish home health orders.     Tex Guthrie DPM  Podiatry  [facility]  [phone] (cellular)

## 2018-07-10 NOTE — PLAN OF CARE
Problem: Patient Care Overview  Goal: Plan of Care Review  Outcome: Ongoing (interventions implemented as appropriate)  Remained free from injury. Tolerating diet. Insulin given per sliding scale. Ambulating independently. Pain controlled with po medication. Chart check complete.

## 2018-07-11 NOTE — PLAN OF CARE
D/C home with self care.      07/11/18 0931   Final Note   Assessment Type Final Discharge Note   Discharge Disposition Home   Discharge plans and expectations educations in teach back method with documentation complete? Yes   Right Care Referral Info   Post Acute Recommendation No Care

## 2018-07-12 ENCOUNTER — PATIENT OUTREACH (OUTPATIENT)
Dept: ADMINISTRATIVE | Facility: CLINIC | Age: 63
End: 2018-07-12

## 2018-07-12 LAB
BACTERIA BLD CULT: NORMAL
BACTERIA BLD CULT: NORMAL
BACTERIA SPEC AEROBE CULT: NORMAL
BACTERIA SPEC AEROBE CULT: NORMAL

## 2018-07-12 NOTE — PATIENT INSTRUCTIONS
Discharge Instructions: Caring for Your Incision  You are going home with stitches (sutures), surgical staples, special strips of surgical tape called Steri-Strips, or surgical skin glue. One of these items was used to close your incision, help stop bleeding, and speed healing. Follow the tips on this sheet to help your incision heal.  Home care  · Always wash your hands before touching your incision.  · Keep your incision clean and dry.  · Avoid doing things that could cause dirt or sweat to get on your incision.  · Dont pick at scabs. They help protect the wound.  · Keep your incision out of water.  · Take a sponge bath to avoid getting your incision wet, unless your healthcare provider tells you otherwise.  · Ask your provider when can you take a shower or bathe.  · Ask your provider about the best way to keep your incision dry when bathing or showering.  · Pat sutures dry if they get wet. Dont rub.  · Leave the dressing (bandage) in place until you are told to remove it or change it. Change it only as directed, using clean hands.  · After the first 12 hours, change your dressing every 24 hours, or as directed by your healthcare provider.  · Change your dressing if it gets wet or soiled.  Care for specific closures  Follow these guidelines unless your child's healthcare provider tells you otherwise:  · Sutures or staples. Once you no longer need to keep these dry, clean the incision or wound daily. First remove the bandage using clean hands. Then wash the area gently with soap and warm water. Use a wet cotton swab to loosen and remove any blood or crust that forms. After cleaning, put a thin layer of antibiotic ointment on. Then put on a new bandage.  · Skin glue. Dont put liquid, ointment, or cream on your incision or wound while the glue is in place. Avoid activities that cause heavy sweating. Protect the incision or wound from sunlight. Do not scratch, rub, or pick at the glue. Do not put tape directly over  the glue. The glue should peel off within 5 to 10 days.  · Surgical tape. Keep your incision or wound dry. If it gets wet, blot the area dry with a clean towel. Surgical tape usually falls off within 7 to 10 days. If it has not fallen off after 10 days, contact your healthcare provider before taking it off yourself. If you are told to remove the tape, put mineral oil or petroleum jelly on a cotton ball. Gently rub the tape until it is removed.  Follow-up care  Follow up with your healthcare provider to ask how long sutures or staples should be left in place. Be sure to return for suture or staple removal as directed. If dissolving stitches were used in your mouth, these will not need to be removed. They should fall out or dissolve on their own.  If tape closures were used, remove them yourself when your provider recommends if they have not fallen off on their own. If skin glue was used, the glue will wear off by itself.      When to seek medical care  Call your healthcare provider right away if you have any of the following:  · More pain, redness, swelling, bleeding, or foul-smelling discharge around the incision area  · Fever of 100.4°F (38°C) or higher, or as directed by your healthcare provider  · Shaking chills  · Vomiting or nausea that doesnt go away  · Numbness, coldness, or tingling around the incision area, or changes in skin color  · Opening of the sutures or wound  · Stitches or staples come apart or fall out or surgical tape falls off before 7 days, or as directed by your provider   Date Last Reviewed: 10/22/2014  © 1081-2946 [employer]. [address] [place] [address], [place] [address]. All rights reserved. This information is not intended as a substitute for professional medical care. Always follow your healthcare professional's instructions.

## 2018-07-13 ENCOUNTER — OFFICE VISIT (OUTPATIENT)
Dept: PODIATRY | Facility: CLINIC | Age: 63
End: 2018-07-13
Payer: MEDICARE

## 2018-07-13 VITALS
WEIGHT: 225.06 LBS | DIASTOLIC BLOOD PRESSURE: 97 MMHG | RESPIRATION RATE: 20 BRPM | HEART RATE: 73 BPM | BODY MASS INDEX: 31.51 KG/M2 | HEIGHT: 71 IN | SYSTOLIC BLOOD PRESSURE: 187 MMHG

## 2018-07-13 DIAGNOSIS — M86.172 ACUTE OSTEOMYELITIS OF TOE OF LEFT FOOT: ICD-10-CM

## 2018-07-13 DIAGNOSIS — Z98.890 STATUS POST LEFT FOOT SURGERY: Primary | ICD-10-CM

## 2018-07-13 LAB — BACTERIA SPEC ANAEROBE CULT: NORMAL

## 2018-07-13 PROCEDURE — 99024 POSTOP FOLLOW-UP VISIT: CPT | Mod: S$GLB,,, | Performed by: PODIATRIST

## 2018-07-13 PROCEDURE — 99999 PR PBB SHADOW E&M-EST. PATIENT-LVL III: CPT | Mod: PBBFAC,,, | Performed by: PODIATRIST

## 2018-07-13 NOTE — PROGRESS NOTES
Subjective:     Patient ID: Lavelle Ladd is a 62 y.o. male.    Chief Complaint: Post-op Evaluation    HPI: This 62 year old male returns to the clinic 4 days status post left toe ulcer debridement procedure. Patient has no complaints of fever chills or sweats. Positive pain. Patient states dressing was kept dry, clean, and intact.       Patient Active Problem List   Diagnosis    Renal hypertension    GERD (gastroesophageal reflux disease)    Peptic ulcer disease    Malignant HTN with heart disease, w/o CHF, with chronic kidney disease    Acidosis    Essential hypertension    Acute diastolic heart failure    Gastroparesis     donor kidney transplant for DM 16    Prophylactic immunotherapy    Adverse effect of glucocorticoids and synthetic analogues, sequela    Osteoarthritis of lumbar spine    Osteoarthritis of thoracic spine with myelopathy    Type 2 diabetes mellitus with hyperglycemia, with long-term current use of insulin    Coronary artery disease involving native coronary artery of native heart without angina pectoris    Hyperlipidemia    Chronic obstructive pulmonary disease    Ulnar neuropathy    Chronic kidney disease (CKD), stage III (moderate)    Reactive airway disease    Kidney transplant rejection    Type 2 diabetes mellitus with retinopathy, with long-term current use of insulin    Type 2 diabetes mellitus with diabetic neuropathy, with long-term current use of insulin    H/O amputation    Cavitary lesion of lung    Opacity of lung on imaging study    Bacteremia due to Gram-negative bacteria    ESBL (extended spectrum beta-lactamase) producing bacteria infection    History of hepatitis C, s/p successful Rx w/ SVR12 - 2017    Kidney transplant recipient    Intractable vomiting with nausea    PVD (peripheral vascular disease)    Chronic bilateral low back pain with left-sided sciatica    Diabetic polyneuropathy    Other chest pain    Disc disease,  "degenerative, lumbar or lumbosacral    Numbness and tingling    Adjustment insomnia    Lumbar stenosis with neurogenic claudication    Open wound of left great toe    Gangrene    Acute lumbar radiculopathy    Chronic lumbar radiculopathy    Gangrene of toe of left foot    Long term current use of immunosuppressive drug    Peripheral vascular disease    Diabetic foot infection       Medication List with Changes/Refills   New Medications    CIPROFLOXACIN HCL (CIPRO) 500 MG TABLET    Take 1 tablet (500 mg total) by mouth every 12 (twelve) hours. for 10 days   Current Medications    AMLODIPINE (NORVASC) 2.5 MG TABLET    Take 1 tablet (2.5 mg total) by mouth once daily.    ASPIRIN (ECOTRIN) 81 MG EC TABLET    Take 1 tablet (81 mg total) by mouth once daily.    BD INSULIN PEN NEEDLE UF SHORT 31 GAUGE X 5/16" NDLE        BD INSULIN SYRINGE ULTRA-FINE 1 ML 31 GAUGE X 5/16 SYRG    USE ONE AS DIRECTED FOUR TIMES DAILY WITH MEALS AND AT BEDTIME    BLOOD SUGAR DIAGNOSTIC STRP    1 each by Misc.(Non-Drug; Combo Route) route 3 (three) times daily.    BLOOD-GLUCOSE METER KIT    Use as instructed    BUDESONIDE-FORMOTEROL 160-4.5 MCG (SYMBICORT) 160-4.5 MCG/ACTUATION HFAA    Inhale 2 puffs into the lungs every 12 (twelve) hours. Wash out mouth after using    ERGOCALCIFEROL (ERGOCALCIFEROL) 50,000 UNIT CAP    Take 1 capsule (50,000 Units total) by mouth every 7 days. Take on Mondays    HYDROCODONE-ACETAMINOPHEN (NORCO)  MG PER TABLET    Take 1 tablet by mouth every 6 (six) hours as needed.    INHALATION DEVICE (BREATHERITE VALVED MDI CHAMBER)    Use as directed for inhalation.    INSULIN NPH (NOVOLIN N) 100 UNIT/ML INJECTION    Take 25 units subq with breakfast and 15 units at bedtime    LANCETS MISC    1 each by Misc.(Non-Drug; Combo Route) route 3 (three) times daily.    NOVOLIN R REGULAR U-100 INSULN 100 UNIT/ML INJECTION    INJECT 6 UNITS WITH BREAKFAST AND 8 UNITS WITH DINNER, SUBCUTANEOUSLY 30 MIN BEFORE MEAL. " "   ONDANSETRON (ZOFRAN-ODT) 4 MG TBDL    Take 1 tablet (4 mg total) by mouth every 8 (eight) hours as needed.    PEN NEEDLE, DIABETIC (EASY COMFORT PEN NEEDLES) 32 GAUGE X 5/32" NDLE    Inject 1 each into the skin 3 (three) times daily.    PEN NEEDLE, DIABETIC 31 GAUGE X 1/4" NDLE    1 each by Misc.(Non-Drug; Combo Route) route 4 (four) times daily.    PREDNISONE (DELTASONE) 5 MG TABLET    Take 1 tablet (5 mg total) by mouth once daily.    SODIUM BICARBONATE 650 MG TABLET    Take 4 tablets (2,600 mg total) by mouth 2 (two) times daily.    TACROLIMUS (PROGRAF) 0.5 MG CAP    Take 3 capsules (1.5 mg total) by mouth every 12 (twelve) hours. Z94.0 Kidney Transplant    ZOLPIDEM (AMBIEN) 5 MG TAB    Take 2 tablets (10 mg total) by mouth nightly as needed.       Review of patient's allergies indicates:  No Known Allergies    Past Surgical History:   Procedure Laterality Date    AORTOGRAPHY WITH SERIALOGRAPHY N/A 6/14/2018    Procedure: LEFT LEG ANGIOGRAM;  Surgeon: Donal Mcdonald MD;  Location: [facility] CATH LAB;  Service: Vascular;  Laterality: N/A;    av bovine graft      Left UE    AV FISTULA PLACEMENT      left UE    CARDIAC CATHETERIZATION  02/2015    KIDNEY TRANSPLANT  05/21/2016    LEG AMPUTATION THROUGH KNEE  2011    right LE, started as nail puncture leading to diabetic ulcer       Family History   Problem Relation Age of Onset    Cancer Father     Diabetes Father     Heart failure Father     Stroke Father     Heart failure Mother     Kidney disease Neg Hx        Social History     Social History    Marital status: Single     Spouse name: N/A    Number of children: N/A    Years of education: N/A     Occupational History    Disabled      Disabled     Social History Main Topics    Smoking status: Former Smoker     Packs/day: 1.00     Years: 40.00     Quit date: 1/11/2013    Smokeless tobacco: Never Used    Alcohol use 3.6 oz/week     6 Cans of beer per week      Comment: 6 beers/week    Drug use: No " "   Sexual activity: No     Other Topics Concern    Not on file     Social History Narrative    Lives alone. Retired from construction and various jobs, now retired. Would like to return to work PT to alleviate boredom.       Vitals:    07/13/18 0857   BP: (!) 187/97   Pulse: 73   Resp: 20   Weight: 102.1 kg (225 lb 1.4 oz)   Height: 5' 11" (1.803 m)   PainSc:   6   PainLoc: Foot       Hemoglobin A1C   Date Value Ref Range Status   07/07/2018 10.2 (H) 4.0 - 5.6 % Final     Comment:     ADA Screening Guidelines:  5.7-6.4%  Consistent with prediabetes  >or=6.5%  Consistent with diabetes  High levels of fetal hemoglobin interfere with the HbA1C  assay. Heterozygous hemoglobin variants (HbS, HgC, etc)do  not significantly interfere with this assay.   However, presence of multiple variants may affect accuracy.     06/12/2018 9.9 (H) 4.0 - 5.6 % Final     Comment:     ADA Screening Guidelines:  5.7-6.4%  Consistent with prediabetes  >or=6.5%  Consistent with diabetes  High levels of fetal hemoglobin interfere with the HbA1C  assay. Heterozygous hemoglobin variants (HbS, HgC, etc)do  not significantly interfere with this assay.   However, presence of multiple variants may affect accuracy.     11/03/2017 7.1 (H) 4.0 - 5.6 % Final     Comment:     According to ADA guidelines, hemoglobin A1c <7.0% represents  optimal control in non-pregnant diabetic patients. Different  metrics may apply to specific patient populations.   Standards of Medical Care in Diabetes-2016.  For the purpose of screening for the presence of diabetes:  <5.7%     Consistent with the absence of diabetes  5.7-6.4%  Consistent with increasing risk for diabetes   (prediabetes)  >or=6.5%  Consistent with diabetes  Currently, no consensus exists for use of hemoglobin A1c  for diagnosis of diabetes for children.  This Hemoglobin A1c assay has significant interference with fetal   hemoglobin   (HbF). The results are invalid for patients with abnormal amounts of "   HbF,   including those with known Hereditary Persistence   of Fetal Hemoglobin. Heterozygous hemoglobin variants (HbAS, HbAC,   HbAD, HbAE, HbA2) do not significantly interfere with this assay;   however, presence of multiple variants in a sample may impact the %   interference.         Review of Systems   Constitutional: Negative for chills and fever.   Respiratory: Negative for shortness of breath.    Cardiovascular: Negative for chest pain, palpitations, orthopnea, claudication and leg swelling.   Gastrointestinal: Negative for diarrhea, nausea and vomiting.   Musculoskeletal: Negative for joint pain.   Skin: Negative for rash.   Neurological: Positive for tingling and sensory change. Negative for dizziness, focal weakness and weakness.   Psychiatric/Behavioral: Negative.              Objective:      Physical examination: General: Patient is in no acute distress, alert and oriented x 3.  Dressing to left foot clean, dry, and intact.   Lower Extremity Exam:  Vascular: Dorsalis pedis and Posterior tibial pulses faint on left foot.  Capillary fill time blanchabe sec to toes on left foot. No edema noted on left foot.   Dermatologic: Left hallux dorsal ulcer noted with granular base. Left distal hallux ulcer noted with fibrotic/granular base. No exposed bone noted.  Positive decreased erythema, drainage, or increased temp noted to surgical site.   Neurological: Light touch sensation intact to left foot.   Musculoskeletal: Negative pain on palpation/ROM of left foot. Right BKA noted.     TEST RESULTS: Pathology bone culture results reveals acute osteomyelitis and aerobic cultures reveals                   Assessment:       Encounter Diagnoses   Name Primary?    Status post left foot surgery Yes    Acute osteomyelitis of toe of left foot          Plan:   Status post left foot surgery    Acute osteomyelitis of toe of left foot  -     ciprofloxacin HCl (CIPRO) 500 MG tablet; Take 1 tablet (500 mg total) by mouth every  12 (twelve) hours. for 10 days  Dispense: 20 tablet; Refill: 1      I counseled the patient on his conditions, regarding findings of my examination, my impressions, and usual treatment plan.   Reviewed bone pathology results in exam room with patient.   Short-term goals include maintaining good offloading and minimizing bioburden, promoting granulation and epithelialization to healing.  Long-term goals include keeping the wound healed by good offloading and medical management under the direction of internist.  Wound was irrigated with sterile saline and cleansed with wound cleanse cloth. The patient tolerated this well. Wound was then dressed with Mesalt, gauze, and papertape. Patient was given instructions on daily dressing changes. Patient was also instructed on the importance of keeping the wound and dressings clean dry and intact and keeping pressure off the wound area until complete healing of the wound. Home wound care instructions ar provided.  Pathology and culture results discussed with infectious disease (Dr. Veliz) and prescription for Ciprofloxacin 500mg to be taken twice daily was prescribed. Patient to be scheduled with infectious disease in 2 weeks.   Follow-up:Patient is to return to the clinic in 1 weeks  for follow-up but should call [facility] immediately if any signs of infection, such as fever, chills, sweats, increased redness or pain.                        Sharnette Miles, DPM [facility]

## 2018-07-16 RX ORDER — CIPROFLOXACIN 500 MG/1
500 TABLET ORAL EVERY 12 HOURS
Qty: 20 TABLET | Refills: 1 | Status: SHIPPED | OUTPATIENT
Start: 2018-07-16 | End: 2018-07-26

## 2018-07-20 ENCOUNTER — HOSPITAL ENCOUNTER (INPATIENT)
Facility: HOSPITAL | Age: 63
LOS: 3 days | Discharge: HOME HEALTH | DRG: 300 | End: 2018-07-23
Attending: EMERGENCY MEDICINE | Admitting: INTERNAL MEDICINE
Payer: MEDICARE

## 2018-07-20 ENCOUNTER — OFFICE VISIT (OUTPATIENT)
Dept: PODIATRY | Facility: CLINIC | Age: 63
DRG: 300 | End: 2018-07-20
Payer: MEDICARE

## 2018-07-20 VITALS
HEART RATE: 96 BPM | RESPIRATION RATE: 20 BRPM | BODY MASS INDEX: 31.26 KG/M2 | HEIGHT: 71 IN | DIASTOLIC BLOOD PRESSURE: 82 MMHG | SYSTOLIC BLOOD PRESSURE: 118 MMHG | WEIGHT: 223.31 LBS

## 2018-07-20 DIAGNOSIS — E11.628 DIABETIC FOOT INFECTION: ICD-10-CM

## 2018-07-20 DIAGNOSIS — I73.9 PVD (PERIPHERAL VASCULAR DISEASE): Chronic | ICD-10-CM

## 2018-07-20 DIAGNOSIS — L03.90 CELLULITIS: ICD-10-CM

## 2018-07-20 DIAGNOSIS — L03.116 CELLULITIS OF LEFT FOOT: Primary | ICD-10-CM

## 2018-07-20 DIAGNOSIS — L97.522 TOE ULCER, LEFT, WITH FAT LAYER EXPOSED: ICD-10-CM

## 2018-07-20 DIAGNOSIS — L08.9 DIABETIC FOOT INFECTION: ICD-10-CM

## 2018-07-20 DIAGNOSIS — M86.172 ACUTE OSTEOMYELITIS OF TOE OF LEFT FOOT: ICD-10-CM

## 2018-07-20 DIAGNOSIS — I96 GANGRENE OF TOE OF LEFT FOOT: Primary | ICD-10-CM

## 2018-07-20 LAB
ALBUMIN SERPL BCP-MCNC: 3.6 G/DL
ALP SERPL-CCNC: 92 U/L
ALT SERPL W/O P-5'-P-CCNC: 16 U/L
ANION GAP SERPL CALC-SCNC: 12 MMOL/L
AST SERPL-CCNC: 12 U/L
BACTERIA #/AREA URNS HPF: ABNORMAL /HPF
BASOPHILS # BLD AUTO: 0.01 K/UL
BASOPHILS NFR BLD: 0.1 %
BILIRUB SERPL-MCNC: 0.6 MG/DL
BILIRUB UR QL STRIP: NEGATIVE
BUN SERPL-MCNC: 19 MG/DL
CALCIUM SERPL-MCNC: 9.5 MG/DL
CHLORIDE SERPL-SCNC: 100 MMOL/L
CLARITY UR: CLEAR
CO2 SERPL-SCNC: 23 MMOL/L
COLOR UR: YELLOW
CREAT SERPL-MCNC: 1.8 MG/DL
DIFFERENTIAL METHOD: NORMAL
EOSINOPHIL # BLD AUTO: 0.1 K/UL
EOSINOPHIL NFR BLD: 0.6 %
ERYTHROCYTE [DISTWIDTH] IN BLOOD BY AUTOMATED COUNT: 12.7 %
EST. GFR  (AFRICAN AMERICAN): 45 ML/MIN/1.73 M^2
EST. GFR  (NON AFRICAN AMERICAN): 39 ML/MIN/1.73 M^2
GLUCOSE SERPL-MCNC: 280 MG/DL
GLUCOSE UR QL STRIP: ABNORMAL
HCT VFR BLD AUTO: 44.1 %
HGB BLD-MCNC: 14.8 G/DL
HGB UR QL STRIP: ABNORMAL
HYALINE CASTS #/AREA URNS LPF: 1 /LPF
KETONES UR QL STRIP: NEGATIVE
LACTATE SERPL-SCNC: 1.2 MMOL/L
LACTATE SERPL-SCNC: 1.3 MMOL/L
LEUKOCYTE ESTERASE UR QL STRIP: NEGATIVE
LYMPHOCYTES # BLD AUTO: 1.6 K/UL
LYMPHOCYTES NFR BLD: 18.4 %
MCH RBC QN AUTO: 31 PG
MCHC RBC AUTO-ENTMCNC: 33.6 G/DL
MCV RBC AUTO: 92 FL
MICROSCOPIC COMMENT: ABNORMAL
MONOCYTES # BLD AUTO: 0.9 K/UL
MONOCYTES NFR BLD: 10.6 %
NEUTROPHILS # BLD AUTO: 6.2 K/UL
NEUTROPHILS NFR BLD: 70.3 %
NITRITE UR QL STRIP: NEGATIVE
PH UR STRIP: 7 [PH] (ref 5–8)
PLATELET # BLD AUTO: 161 K/UL
PMV BLD AUTO: 10.1 FL
POCT GLUCOSE: 269 MG/DL (ref 70–110)
POTASSIUM SERPL-SCNC: 4.2 MMOL/L
PROCALCITONIN SERPL IA-MCNC: 0.11 NG/ML
PROT SERPL-MCNC: 7.4 G/DL
PROT UR QL STRIP: ABNORMAL
RBC # BLD AUTO: 4.78 M/UL
RBC #/AREA URNS HPF: 1 /HPF (ref 0–4)
SODIUM SERPL-SCNC: 135 MMOL/L
SP GR UR STRIP: 1.01 (ref 1–1.03)
SQUAMOUS #/AREA URNS HPF: 1 /HPF
URN SPEC COLLECT METH UR: ABNORMAL
UROBILINOGEN UR STRIP-ACNC: NEGATIVE EU/DL
WBC # BLD AUTO: 8.85 K/UL
WBC #/AREA URNS HPF: 4 /HPF (ref 0–5)
YEAST URNS QL MICRO: ABNORMAL

## 2018-07-20 PROCEDURE — 63600175 PHARM REV CODE 636 W HCPCS: Performed by: PHYSICIAN ASSISTANT

## 2018-07-20 PROCEDURE — 21400001 HC TELEMETRY ROOM

## 2018-07-20 PROCEDURE — 93010 ELECTROCARDIOGRAM REPORT: CPT | Mod: ,,, | Performed by: INTERNAL MEDICINE

## 2018-07-20 PROCEDURE — 25000003 PHARM REV CODE 250: Performed by: EMERGENCY MEDICINE

## 2018-07-20 PROCEDURE — 87040 BLOOD CULTURE FOR BACTERIA: CPT

## 2018-07-20 PROCEDURE — 81000 URINALYSIS NONAUTO W/SCOPE: CPT

## 2018-07-20 PROCEDURE — 11000001 HC ACUTE MED/SURG PRIVATE ROOM

## 2018-07-20 PROCEDURE — 99024 POSTOP FOLLOW-UP VISIT: CPT | Mod: S$GLB,,, | Performed by: PODIATRIST

## 2018-07-20 PROCEDURE — 80053 COMPREHEN METABOLIC PANEL: CPT

## 2018-07-20 PROCEDURE — 99999 PR PBB SHADOW E&M-EST. PATIENT-LVL III: CPT | Mod: PBBFAC,,, | Performed by: PODIATRIST

## 2018-07-20 PROCEDURE — 83605 ASSAY OF LACTIC ACID: CPT | Mod: 91

## 2018-07-20 PROCEDURE — 36415 COLL VENOUS BLD VENIPUNCTURE: CPT

## 2018-07-20 PROCEDURE — 63600175 PHARM REV CODE 636 W HCPCS: Performed by: INTERNAL MEDICINE

## 2018-07-20 PROCEDURE — 63600175 PHARM REV CODE 636 W HCPCS: Performed by: EMERGENCY MEDICINE

## 2018-07-20 PROCEDURE — 84145 PROCALCITONIN (PCT): CPT

## 2018-07-20 PROCEDURE — 96375 TX/PRO/DX INJ NEW DRUG ADDON: CPT

## 2018-07-20 PROCEDURE — 85025 COMPLETE CBC W/AUTO DIFF WBC: CPT

## 2018-07-20 PROCEDURE — 93005 ELECTROCARDIOGRAM TRACING: CPT

## 2018-07-20 PROCEDURE — 99285 EMERGENCY DEPT VISIT HI MDM: CPT | Mod: 25

## 2018-07-20 PROCEDURE — 96365 THER/PROPH/DIAG IV INF INIT: CPT

## 2018-07-20 RX ORDER — INSULIN ASPART 100 [IU]/ML
1-10 INJECTION, SOLUTION INTRAVENOUS; SUBCUTANEOUS
Status: DISCONTINUED | OUTPATIENT
Start: 2018-07-20 | End: 2018-07-23 | Stop reason: HOSPADM

## 2018-07-20 RX ORDER — IBUPROFEN 200 MG
16 TABLET ORAL
Status: DISCONTINUED | OUTPATIENT
Start: 2018-07-20 | End: 2018-07-23 | Stop reason: HOSPADM

## 2018-07-20 RX ORDER — AMLODIPINE BESYLATE 2.5 MG/1
2.5 TABLET ORAL DAILY
Status: DISCONTINUED | OUTPATIENT
Start: 2018-07-21 | End: 2018-07-21

## 2018-07-20 RX ORDER — HYDROMORPHONE HYDROCHLORIDE 2 MG/ML
1 INJECTION, SOLUTION INTRAMUSCULAR; INTRAVENOUS; SUBCUTANEOUS EVERY 4 HOURS PRN
Status: DISCONTINUED | OUTPATIENT
Start: 2018-07-20 | End: 2018-07-21

## 2018-07-20 RX ORDER — MORPHINE SULFATE 4 MG/ML
4 INJECTION, SOLUTION INTRAMUSCULAR; INTRAVENOUS
Status: COMPLETED | OUTPATIENT
Start: 2018-07-20 | End: 2018-07-20

## 2018-07-20 RX ORDER — HYDROCODONE BITARTRATE AND ACETAMINOPHEN 10; 325 MG/1; MG/1
1 TABLET ORAL EVERY 6 HOURS PRN
Status: DISCONTINUED | OUTPATIENT
Start: 2018-07-20 | End: 2018-07-23 | Stop reason: HOSPADM

## 2018-07-20 RX ORDER — IBUPROFEN 200 MG
24 TABLET ORAL
Status: DISCONTINUED | OUTPATIENT
Start: 2018-07-20 | End: 2018-07-23 | Stop reason: HOSPADM

## 2018-07-20 RX ORDER — GLUCAGON 1 MG
1 KIT INJECTION
Status: DISCONTINUED | OUTPATIENT
Start: 2018-07-20 | End: 2018-07-23 | Stop reason: HOSPADM

## 2018-07-20 RX ORDER — PREDNISONE 5 MG/1
5 TABLET ORAL DAILY
Status: DISCONTINUED | OUTPATIENT
Start: 2018-07-21 | End: 2018-07-23 | Stop reason: HOSPADM

## 2018-07-20 RX ADMIN — INSULIN DETEMIR 15 UNITS: 100 INJECTION, SOLUTION SUBCUTANEOUS at 09:07

## 2018-07-20 RX ADMIN — VANCOMYCIN HYDROCHLORIDE 1250 MG: 10 INJECTION, POWDER, LYOPHILIZED, FOR SOLUTION INTRAVENOUS at 06:07

## 2018-07-20 RX ADMIN — HYDROMORPHONE HYDROCHLORIDE 1 MG: 2 INJECTION, SOLUTION INTRAMUSCULAR; INTRAVENOUS; SUBCUTANEOUS at 06:07

## 2018-07-20 RX ADMIN — INSULIN ASPART 3 UNITS: 100 INJECTION, SOLUTION INTRAVENOUS; SUBCUTANEOUS at 10:07

## 2018-07-20 RX ADMIN — MORPHINE SULFATE 4 MG: 4 INJECTION INTRAVENOUS at 04:07

## 2018-07-20 RX ADMIN — HYDROMORPHONE HYDROCHLORIDE 1 MG: 2 INJECTION, SOLUTION INTRAMUSCULAR; INTRAVENOUS; SUBCUTANEOUS at 10:07

## 2018-07-20 RX ADMIN — TACROLIMUS 1.5 MG: 1 CAPSULE ORAL at 09:07

## 2018-07-20 RX ADMIN — PIPERACILLIN AND TAZOBACTAM 4.5 G: 4; .5 INJECTION, POWDER, LYOPHILIZED, FOR SOLUTION INTRAVENOUS; PARENTERAL at 05:07

## 2018-07-20 NOTE — HPI
62 year old male with type II diabetes mellitus, diabetic neuropathy, chronic back pain, right above the knee amputation and history of kidney transplant on 5/21/16, on Prograf and prednisone presented to the er  Due to left foot infection and cellulitis. Pt was referred to the ED by podiatry for IV abx. Symptoms are constant and moderate in severity. No mitigating or exacerbating factors reported. Associated sxs include L foot pain, L foot swelling. Patient denies any falls, trauma, fever, chills, worsening extremity weakness/numbness, and all other sxs at this time. Pt had a debridement of his L great toe on 7/9. No further complaints or concerns at this time.   He was  Recently d/c  from [facility] : Per D/C summary :On 7/6 patient was admitted to Medicine secondary to Gangrene of Left great toe.  Xray of left foot showed no acute findings.  No change in the incompletely healed fracture of the base of the proximal phalanx of the 2nd toe.  Arterial US showed diffuse atherosclerotic change of the left lower extremity arteries.  No findings suspicious for focal stenosis or occlusion.  He has a h/o PVD s/p Angiogram with balloon angioplasty and atherectomy to LLE per Dr. Mcdonald in June.  Podiatry consulted in the ER and per Dr. Magaña he will likely need amputation.  Vascular consult pending.  Started on Vanc and Zosyn.  Nephrology consulted given h/o kidney transplant.  BC continue to show NGTD.  As of 7/8 patient has been evaluated by vascular surgeon Dr. Uribe.  Per vascular, left foot arterial blood flow is adequate.  Ok for podiatry to proceed with OR for aggressive local debridement/amputation.  Reconsult if any further assistance is required.  Will d/w Dr. Magaña to determine POC moving forward.  Continue local care per podiatry recs for now.  As of 7/9 patient is s/p I and D per Dr. Magaña today.  Wound culture pending.  As of 7/10 patient evaluated by Dr. Guthrie with podiatry and cleared for DC from their standpoint.   Per podiatry patient will continue local wound care daily and was instructed to f/u with Dr. Magaña later this week for a wound re-check and verbalized + understanding.  Per podiatry, patient can complete 7 days of oral Zyvox to cover for MRSA.  BC and wound culture continues to show NGTD.  Case d/w Dr. Mueller.  Patient seen and examined and deemed medically stable to DC home today.  He was offered home health to continue wound care, but declined.  He will be instructed on daily wound care per nursing prior to DC.  Medications reconicled for DC home.  Also instructed to f/u with his PCP within 3 days and with Nephrology within 2-3 weeks and verbalized + understanding.

## 2018-07-20 NOTE — PROGRESS NOTES
Subjective:     Patient ID: Lavelle Ladd is a 62 y.o. male.    Chief Complaint: Post-op Evaluation (DOS 2018, rated pain 6/10)    HPI: This 62 year old male returns to the clinic 11 days status post status post left toe ulcer debridement procedure. Patient has no complaints of fever chills or sweats. Positive pain, rates pain 6/10 Patient states dressing was kept dry, clean, and intact. Patient admits discoloration of left toes 3,4,5.         Patient Active Problem List   Diagnosis    Renal hypertension    GERD (gastroesophageal reflux disease)    Peptic ulcer disease    Malignant HTN with heart disease, w/o CHF, with chronic kidney disease    Acidosis    Essential hypertension    Acute diastolic heart failure    Gastroparesis     donor kidney transplant for DM 16    Prophylactic immunotherapy    Adverse effect of glucocorticoids and synthetic analogues, sequela    Osteoarthritis of lumbar spine    Osteoarthritis of thoracic spine with myelopathy    Type 2 diabetes mellitus with hyperglycemia, with long-term current use of insulin    Coronary artery disease involving native coronary artery of native heart without angina pectoris    Hyperlipidemia    Chronic obstructive pulmonary disease    Ulnar neuropathy    Chronic kidney disease (CKD), stage III (moderate)    Reactive airway disease    Kidney transplant rejection    Type 2 diabetes mellitus with retinopathy, with long-term current use of insulin    Type 2 diabetes mellitus with diabetic neuropathy, with long-term current use of insulin    H/O amputation    Cavitary lesion of lung    Opacity of lung on imaging study    Bacteremia due to Gram-negative bacteria    ESBL (extended spectrum beta-lactamase) producing bacteria infection    History of hepatitis C, s/p successful Rx w/ SVR12 - 2017    Kidney transplant recipient    Intractable vomiting with nausea    PVD (peripheral vascular disease)    Chronic  "bilateral low back pain with left-sided sciatica    Diabetic polyneuropathy    Other chest pain    Disc disease, degenerative, lumbar or lumbosacral    Numbness and tingling    Adjustment insomnia    Lumbar stenosis with neurogenic claudication    Open wound of left great toe    Gangrene    Acute lumbar radiculopathy    Chronic lumbar radiculopathy    Gangrene of toe of left foot    Long term current use of immunosuppressive drug    Peripheral vascular disease    Diabetic foot infection       Medication List with Changes/Refills   Current Medications    AMLODIPINE (NORVASC) 2.5 MG TABLET    Take 1 tablet (2.5 mg total) by mouth once daily.    ASPIRIN (ECOTRIN) 81 MG EC TABLET    Take 1 tablet (81 mg total) by mouth once daily.    BD INSULIN PEN NEEDLE UF SHORT 31 GAUGE X 5/16" NDLE        BD INSULIN SYRINGE ULTRA-FINE 1 ML 31 GAUGE X 5/16 SYRG    USE ONE AS DIRECTED FOUR TIMES DAILY WITH MEALS AND AT BEDTIME    BLOOD SUGAR DIAGNOSTIC STRP    1 each by Misc.(Non-Drug; Combo Route) route 3 (three) times daily.    BLOOD-GLUCOSE METER KIT    Use as instructed    BUDESONIDE-FORMOTEROL 160-4.5 MCG (SYMBICORT) 160-4.5 MCG/ACTUATION HFAA    Inhale 2 puffs into the lungs every 12 (twelve) hours. Wash out mouth after using    CIPROFLOXACIN HCL (CIPRO) 500 MG TABLET    Take 1 tablet (500 mg total) by mouth every 12 (twelve) hours. for 10 days    ERGOCALCIFEROL (ERGOCALCIFEROL) 50,000 UNIT CAP    Take 1 capsule (50,000 Units total) by mouth every 7 days. Take on Mondays    HYDROCODONE-ACETAMINOPHEN (NORCO)  MG PER TABLET    Take 1 tablet by mouth every 6 (six) hours as needed.    INHALATION DEVICE (BREATHERITE VALVED MDI CHAMBER)    Use as directed for inhalation.    INSULIN NPH (NOVOLIN N) 100 UNIT/ML INJECTION    Take 25 units subq with breakfast and 15 units at bedtime    LANCETS MISC    1 each by Misc.(Non-Drug; Combo Route) route 3 (three) times daily.    NOVOLIN R REGULAR U-100 INSULN 100 UNIT/ML " "INJECTION    INJECT 6 UNITS WITH BREAKFAST AND 8 UNITS WITH DINNER, SUBCUTANEOUSLY 30 MIN BEFORE MEAL.    ONDANSETRON (ZOFRAN-ODT) 4 MG TBDL    Take 1 tablet (4 mg total) by mouth every 8 (eight) hours as needed.    PEN NEEDLE, DIABETIC (EASY COMFORT PEN NEEDLES) 32 GAUGE X 5/32" NDLE    Inject 1 each into the skin 3 (three) times daily.    PEN NEEDLE, DIABETIC 31 GAUGE X 1/4" NDLE    1 each by Misc.(Non-Drug; Combo Route) route 4 (four) times daily.    PREDNISONE (DELTASONE) 5 MG TABLET    Take 1 tablet (5 mg total) by mouth once daily.    SODIUM BICARBONATE 650 MG TABLET    Take 4 tablets (2,600 mg total) by mouth 2 (two) times daily.    TACROLIMUS (PROGRAF) 0.5 MG CAP    Take 3 capsules (1.5 mg total) by mouth every 12 (twelve) hours. Z94.0 Kidney Transplant    ZOLPIDEM (AMBIEN) 5 MG TAB    Take 2 tablets (10 mg total) by mouth nightly as needed.       Review of patient's allergies indicates:  No Known Allergies    Past Surgical History:   Procedure Laterality Date    AORTOGRAPHY WITH SERIALOGRAPHY N/A 6/14/2018    Procedure: LEFT LEG ANGIOGRAM;  Surgeon: Donal Mcdonald MD;  Location: [facility] CATH LAB;  Service: Vascular;  Laterality: N/A;    av bovine graft      Left UE    AV FISTULA PLACEMENT      left UE    CARDIAC CATHETERIZATION  02/2015    KIDNEY TRANSPLANT  05/21/2016    LEG AMPUTATION THROUGH KNEE  2011    right LE, started as nail puncture leading to diabetic ulcer       Family History   Problem Relation Age of Onset    Cancer Father     Diabetes Father     Heart failure Father     Stroke Father     Heart failure Mother     Kidney disease Neg Hx        Social History     Social History    Marital status: Single     Spouse name: N/A    Number of children: N/A    Years of education: N/A     Occupational History    Disabled      Disabled     Social History Main Topics    Smoking status: Former Smoker     Packs/day: 1.00     Years: 40.00     Quit date: 1/11/2013    Smokeless tobacco: Never Used " "   Alcohol use 3.6 oz/week     6 Cans of beer per week      Comment: 6 beers/week    Drug use: No    Sexual activity: No     Other Topics Concern    Not on file     Social History Narrative    Lives alone. Retired from construction and various jobs, now retired. Would like to return to work PT to alleviate boredom.       Vitals:    07/20/18 1031   BP: 118/82   Pulse: 96   Resp: 20   Weight: 101.3 kg (223 lb 5.2 oz)   Height: 5' 11" (1.803 m)   PainSc:   6   PainLoc: Foot       Hemoglobin A1C   Date Value Ref Range Status   07/07/2018 10.2 (H) 4.0 - 5.6 % Final     Comment:     ADA Screening Guidelines:  5.7-6.4%  Consistent with prediabetes  >or=6.5%  Consistent with diabetes  High levels of fetal hemoglobin interfere with the HbA1C  assay. Heterozygous hemoglobin variants (HbS, HgC, etc)do  not significantly interfere with this assay.   However, presence of multiple variants may affect accuracy.     06/12/2018 9.9 (H) 4.0 - 5.6 % Final     Comment:     ADA Screening Guidelines:  5.7-6.4%  Consistent with prediabetes  >or=6.5%  Consistent with diabetes  High levels of fetal hemoglobin interfere with the HbA1C  assay. Heterozygous hemoglobin variants (HbS, HgC, etc)do  not significantly interfere with this assay.   However, presence of multiple variants may affect accuracy.     11/03/2017 7.1 (H) 4.0 - 5.6 % Final     Comment:     According to ADA guidelines, hemoglobin A1c <7.0% represents  optimal control in non-pregnant diabetic patients. Different  metrics may apply to specific patient populations.   Standards of Medical Care in Diabetes-2016.  For the purpose of screening for the presence of diabetes:  <5.7%     Consistent with the absence of diabetes  5.7-6.4%  Consistent with increasing risk for diabetes   (prediabetes)  >or=6.5%  Consistent with diabetes  Currently, no consensus exists for use of hemoglobin A1c  for diagnosis of diabetes for children.  This Hemoglobin A1c assay has significant " interference with fetal   hemoglobin   (HbF). The results are invalid for patients with abnormal amounts of   HbF,   including those with known Hereditary Persistence   of Fetal Hemoglobin. Heterozygous hemoglobin variants (HbAS, HbAC,   HbAD, HbAE, HbA2) do not significantly interfere with this assay;   however, presence of multiple variants in a sample may impact the %   interference.         Review of Systems   Constitutional: Negative for chills and fever.   Respiratory: Negative for shortness of breath.    Cardiovascular: Negative for chest pain, palpitations, orthopnea, claudication and leg swelling.   Gastrointestinal: Negative for diarrhea, nausea and vomiting.   Musculoskeletal: Negative for joint pain.   Skin: Negative for rash.   Neurological: Positive for sensory change. Negative for dizziness, tingling, focal weakness and weakness.   Psychiatric/Behavioral: Negative.            Objective:      Physical examination: General: Patient is in no acute distress, alert and oriented x 3.  Dressing to left foot clean, dry, and intact.   Lower Extremity Exam:  Vascular: Dorsalis pedis and Posterior tibial pulses faint on left foot.  Capillary fill time blanchabe  to toes on left foot. No edema noted on left foot.   Dermatologic: Left hallux dorsal ulcer noted with fibrotic base. Left distal hallux ulcer noted with fibrotic/eschar base. No exposed bone noted. Positive increased erythema toe left toe 1,3,4,5.   Neurological: Light touch sensation intact to left foot.   Musculoskeletal: Negative pain on palpation/ROM of left foot. Right BKA noted.     TEST RESULTS: Pathology bone culture results reveals acute osteomyelitis and aerobic cultures reveals         Assessment:       Encounter Diagnoses   Name Primary?    Cellulitis of left foot Yes    Acute osteomyelitis of toe of left foot     PVD (peripheral vascular disease)     Toe ulcer, left, with fat layer exposed          Plan:   Cellulitis of left  foot    Acute osteomyelitis of toe of left foot    PVD (peripheral vascular disease)    Toe ulcer, left, with fat layer exposed      I counseled the patient on his conditions, regarding findings of my examination, my impressions, and usual treatment plan.   Discussed with patient the need for IV antibiotics and SNF due to failed oral antibiotics (Cipro). Reminded patient that he did refuse these treatment options when he was admitted to hospital on 7/6/18. Patient states he understands and would like to be report to ER for admittance for IV antibiotics and placement into SNF for IV antibiotics and local wound care.             Katerina Magaña, LASHONDA  [facility]

## 2018-07-20 NOTE — ED PROVIDER NOTES
SCRIBE #1 NOTE: I, Corie Murillo, am scribing for, and in the presence of, Ronny Medley PA-C/Wade Segura MD. I have scribed the entire note.      History      Chief Complaint   Patient presents with    Wound Infection     sent here by Dr. [facility] podiatrist for IV antibiotics       Review of patient's allergies indicates:  No Known Allergies     HPI   HPI    2018, 3:20 PM   History obtained from the patient      History of Present Illness: Lavelle Ladd is a 62 y.o. male patient with PSHx of Kidney Txp, PMHx DM, COPD, CAD who presents to the Emergency Department for further evaluation of worsening L foot erythema which onset gradually PTA. Pt was referred to the ED by podiatry for IV abx. Symptoms are constant and moderate in severity. No mitigating or exacerbating factors reported. Associated sxs include L foot pain, L foot swelling. Patient denies any falls, trauma, fever, chills, worsening extremity weakness/numbness, and all other sxs at this time. Pt had a debridement of his L great toe on . No further complaints or concerns at this time.       Arrival mode: Personal vehicle    PCP: Rishabh Esteban MD       Past Medical History:  Past Medical History:   Diagnosis Date    DIONRAH (acute kidney injury) 2016    Arthritis     Chronic obstructive pulmonary disease 2016    Coronary artery disease involving native coronary artery of native heart without angina pectoris 2016     donor kidney transplant for DM 16     Induction with Thymo x3 and IV solumedrol to total 875mg  Kidney Biopsy  2016: 16 glomeruli, ACR type 1 AVR type 2, significant microcirculatory changes, c4d negative, No DSA, 5 to10% fibrosis. Treated with thymo x8 2016- no rejection      Diastolic heart failure     Encounter for blood transfusion     ESRD on RRT since 10/2013 10/29/2013    Biopsy proven diabetic nephropathy and lymphoplasmacytic interstitial infiltrate not c/w with AIN (ddx  sjogrens or assoc with tamm-horsefall protein extravasation)     GERD (gastroesophageal reflux disease)     History of hepatitis C, s/p successful Rx w/ SVR12 - 4/2017 4/5/2017    Completed 12 weeks harvoni w/ SVR    Hyperlipidemia     Hypertension     Prophylactic immunotherapy     Proteinuria     PVD (peripheral vascular disease) 6/26/2017    RLE BKA CT 12/11/16 Extensive atherosclerotic disease of the aorta and mesenteric arteries.     Renal hypertension     Type 2 diabetes mellitus with diabetic neuropathy, with long-term current use of insulin 12/1/2016    Vitamin B12 deficiency        Past Surgical History:  Past Surgical History:   Procedure Laterality Date    AORTOGRAPHY WITH SERIALOGRAPHY N/A 6/14/2018    Procedure: LEFT LEG ANGIOGRAM;  Surgeon: Donal Mcdonald MD;  Location: [facility] CATH LAB;  Service: Vascular;  Laterality: N/A;    av bovine graft      Left UE    AV FISTULA PLACEMENT      left UE    CARDIAC CATHETERIZATION  02/2015    KIDNEY TRANSPLANT  05/21/2016    LEG AMPUTATION THROUGH KNEE  2011    right LE, started as nail puncture leading to diabetic ulcer         Family History:  Family History   Problem Relation Age of Onset    Cancer Father     Diabetes Father     Heart failure Father     Stroke Father     Heart failure Mother     Kidney disease Neg Hx        Social History:  Social History     Social History Main Topics    Smoking status: Former Smoker     Packs/day: 1.00     Years: 40.00     Quit date: 1/11/2013    Smokeless tobacco: Never Used    Alcohol use 3.6 oz/week     6 Cans of beer per week      Comment: 6 beers/week    Drug use: No    Sexual activity: No       ROS   Review of Systems   Constitutional: Negative for chills and fever.   HENT: Negative for sore throat.    Respiratory: Negative for shortness of breath.    Cardiovascular: Negative for chest pain.   Gastrointestinal: Negative for nausea and vomiting.   Genitourinary: Negative for dysuria.    Musculoskeletal: Positive for arthralgias (L foot) and joint swelling (L foot). Negative for back pain.   Skin: Positive for color change (erythema, L foot).   Neurological: Negative for weakness and numbness.   Hematological: Does not bruise/bleed easily.   All other systems reviewed and are negative.      Physical Exam      Initial Vitals [07/20/18 1145]   BP Pulse Resp Temp SpO2   123/72 98 20 98 °F (36.7 °C) 96 %      MAP       --          Physical Exam  Nursing Notes and Vital Signs Reviewed.  Constitutional: Patient is in no acute distress. Well-developed and well-nourished.  Head: Atraumatic. Normocephalic.  Eyes: PERRL. EOM intact. Conjunctivae are not pale. No scleral icterus.  ENT: Mucous membranes are moist. Oropharynx is clear and symmetric.    Neck: Supple. Full ROM. No lymphadenopathy.  Cardiovascular: Regular rate. Regular rhythm. No murmurs, rubs, or gallops. Distal pulses are 2+ and symmetric.  Pulmonary/Chest: No respiratory distress. Clear to auscultation bilaterally. No wheezing or rales.  Abdominal: Soft and non-distended.  There is no tenderness.  No rebound, guarding, or rigidity.   Musculoskeletal: R BKA. Moves all extremities. No obvious deformities. No calf tenderness.  Left foot:  Partial amputation of great toe with possible gangrenous tissue. Erythema and edema extending from mid foot up to toes with TTP. Ankle dorsiflexion and ankle plantar flexion are intact.  Intact sensation to light touch. Distal capillary refill takes less than 2 seconds.  PT and DP pulses are 2+ bilaterally.  Skin: Warm and dry.  Neurological:  Alert, awake, and appropriate.  Normal speech.  No acute focal neurological deficits are appreciated.  Psychiatric: Normal affect. Good eye contact. Appropriate in content.    ED Course    Procedures  ED Vital Signs:  Vitals:    07/20/18 1145 07/20/18 1444 07/20/18 1600 07/20/18 1656   BP: 123/72 (!) 165/109 (!) 182/104 (!) 185/106   Pulse: 98 82 75 79   Resp: 20 18 18 16    Temp: 98 °F (36.7 °C)  98.5 °F (36.9 °C)    TempSrc: Oral  Oral    SpO2: 96% 97% 96% 97%   Weight: 101 kg (222 lb 10.6 oz)       07/20/18 1800 07/20/18 2003 07/20/18 2301   BP: (!) 185/96 (!) 144/88 (!) 173/98   Pulse: 83 78 77   Resp: 18 16 20   Temp: 98.3 °F (36.8 °C) 98.4 °F (36.9 °C) 98.3 °F (36.8 °C)   TempSrc: Oral Oral    SpO2: 95% 95% (!) 94%   Weight:          Abnormal Lab Results:  Labs Reviewed   COMPREHENSIVE METABOLIC PANEL - Abnormal; Notable for the following:        Result Value    Sodium 135 (*)     Glucose 280 (*)     Creatinine 1.8 (*)     eGFR if  45 (*)     eGFR if non  39 (*)     All other components within normal limits   URINALYSIS - Abnormal; Notable for the following:     Protein, UA 1+ (*)     Glucose, UA 3+ (*)     Occult Blood UA Trace (*)     All other components within normal limits   URINALYSIS MICROSCOPIC - Abnormal; Notable for the following:     Yeast, UA Few (*)     All other components within normal limits   CBC W/ AUTO DIFFERENTIAL   LACTIC ACID, PLASMA   PROCALCITONIN   PROCALCITONIN   LACTIC ACID, PLASMA   POCT GLUCOSE MONITORING CONTINUOUS        All Lab Results:  Results for orders placed or performed during the hospital encounter of 07/20/18   CBC auto differential   Result Value Ref Range    WBC 8.85 3.90 - 12.70 K/uL    RBC 4.78 4.60 - 6.20 M/uL    Hemoglobin 14.8 14.0 - 18.0 g/dL    Hematocrit 44.1 40.0 - 54.0 %    MCV 92 82 - 98 fL    MCH 31.0 27.0 - 31.0 pg    MCHC 33.6 32.0 - 36.0 g/dL    RDW 12.7 11.5 - 14.5 %    Platelets 161 150 - 350 K/uL    MPV 10.1 9.2 - 12.9 fL    Gran # (ANC) 6.2 1.8 - 7.7 K/uL    Lymph # 1.6 1.0 - 4.8 K/uL    Mono # 0.9 0.3 - 1.0 K/uL    Eos # 0.1 0.0 - 0.5 K/uL    Baso # 0.01 0.00 - 0.20 K/uL    Gran% 70.3 38.0 - 73.0 %    Lymph% 18.4 18.0 - 48.0 %    Mono% 10.6 4.0 - 15.0 %    Eosinophil% 0.6 0.0 - 8.0 %    Basophil% 0.1 0.0 - 1.9 %    Differential Method Automated    Comprehensive metabolic panel   Result  Value Ref Range    Sodium 135 (L) 136 - 145 mmol/L    Potassium 4.2 3.5 - 5.1 mmol/L    Chloride 100 95 - 110 mmol/L    CO2 23 23 - 29 mmol/L    Glucose 280 (H) 70 - 110 mg/dL    BUN, Bld 19 8 - 23 mg/dL    Creatinine 1.8 (H) 0.5 - 1.4 mg/dL    Calcium 9.5 8.7 - 10.5 mg/dL    Total Protein 7.4 6.0 - 8.4 g/dL    Albumin 3.6 3.5 - 5.2 g/dL    Total Bilirubin 0.6 0.1 - 1.0 mg/dL    Alkaline Phosphatase 92 55 - 135 U/L    AST 12 10 - 40 U/L    ALT 16 10 - 44 U/L    Anion Gap 12 8 - 16 mmol/L    eGFR if African American 45 (A) >60 mL/min/1.73 m^2    eGFR if non African American 39 (A) >60 mL/min/1.73 m^2   Lactic acid, plasma #1   Result Value Ref Range    Lactate (Lactic Acid) 1.3 0.5 - 2.2 mmol/L   Urinalysis   Result Value Ref Range    Specimen UA Urine, Clean Catch     Color, UA Yellow Yellow, Straw, Jessa    Appearance, UA Clear Clear    pH, UA 7.0 5.0 - 8.0    Specific Gravity, UA 1.015 1.005 - 1.030    Protein, UA 1+ (A) Negative    Glucose, UA 3+ (A) Negative    Ketones, UA Negative Negative    Bilirubin (UA) Negative Negative    Occult Blood UA Trace (A) Negative    Nitrite, UA Negative Negative    Urobilinogen, UA Negative <2.0 EU/dL    Leukocytes, UA Negative Negative   Procalcitonin   Result Value Ref Range    Procalcitonin 0.11 <0.25 ng/mL   Urinalysis Microscopic   Result Value Ref Range    RBC, UA 1 0 - 4 /hpf    WBC, UA 4 0 - 5 /hpf    Bacteria, UA Rare None-Occ /hpf    Yeast, UA Few (A) None    Squam Epithel, UA 1 /hpf    Hyaline Casts, UA 1 0-1/lpf /lpf    Microscopic Comment SEE COMMENT    Lactic acid, plasma #2   Result Value Ref Range    Lactate (Lactic Acid) 1.2 0.5 - 2.2 mmol/L   POCT glucose   Result Value Ref Range    POCT Glucose 269 (H) 70 - 110 mg/dL     *Note: Due to a large number of results and/or encounters for the requested time period, some results have not been displayed. A complete set of results can be found in Results Review.       Imaging Results:  Imaging Results          X-Ray  Foot Complete Left (Final result)  Result time 07/20/18 17:31:42    Final result by Jose David Araiza MD (07/20/18 17:31:42)                 Impression:      Soft tissue defect of the 1st toe with apparent exposure of the distal phalanx.  Please correlate with clinical exam.  No evidence of focal bone destruction.    Otherwise chronic findings.      Electronically signed by: Jose David Araiza MD  Date:    07/20/2018  Time:    17:31             Narrative:    EXAMINATION:  XR FOOT COMPLETE 3 VIEW LEFT    CLINICAL HISTORY:  Cellulitis, unspecified    TECHNIQUE:  Standard radiography performed.    COMPARISON:  07/06/2018    FINDINGS:  There appears to be a defect of the soft tissues of the tip of the 1st toe with possible exposed bone.  Please correlate with clinical exam.  No definite bone destruction.    Extensive vascular calcification.    Multifocal degenerative joint disease.  Prominent calcaneal spurring.                               X-Ray Chest AP Portable (Final result)  Result time 07/20/18 14:05:40    Final result by Jose David Araiza MD (07/20/18 14:05:40)                 Impression:      Aortic atherosclerosis.  No acute findings.      Electronically signed by: Jose David Araiza MD  Date:    07/20/2018  Time:    14:05             Narrative:    EXAMINATION:  XR CHEST AP PORTABLE    CLINICAL HISTORY:  Fever., Sepsis;    COMPARISON:  02/26/2018    FINDINGS:  Heart size is normal.  Calcification of the aortic arch is noted.  The lung fields are stable.  No acute cardiopulmonary in full trade.                               The EKG was ordered, reviewed, and independently interpreted by the ED provider.  Interpretation time: 1411  Rate: 82 BPM  Rhythm: normal sinus rhythm  Interpretation: Low voltage QRS. No STEMI.         The Emergency Provider reviewed the vital signs and test results, which are outlined above.    ED Discussion     4:00 PM: JUVENAL Mata transfers care to Dr. Segura.    4:38 PM:   Imelda evaluated pt at this time. He has been on Cipro x3-4 days and had a debridement of the first digit on his L foot on 7/9 after being diagnosed with gangrene. He reports worsening erythema, swelling, and pain to his L foot. He was referred to the ED by podiatry for IV abx.     4:45 PM: Discussed case with JUVENAL Pelletier ([facility]). Dr. Perkins agrees with current care and management of pt and accepts admission.   Admitting Service: Hospital medicine   Admitting Physician: Dr. Perkins  Admit to: Med/surg    4:49 PM: Re-evaluated pt. I have discussed test results, shared treatment plan, and the need for admission with patient. Pt expresses understanding at this time and agree with all information. All questions answered. Pt has no further questions or concerns at this time. Pt is ready for admit.      ED Medication(s):  Medications   amLODIPine tablet 2.5 mg (not administered)   predniSONE tablet 5 mg (not administered)   tacrolimus capsule 1.5 mg (1.5 mg Oral Given 7/20/18 2118)   hydromorphone (PF) injection 1 mg (1 mg Intravenous Given 7/20/18 2236)   glucose chewable tablet 16 g (not administered)   glucose chewable tablet 24 g (not administered)   dextrose 50% injection 12.5 g (not administered)   dextrose 50% injection 25 g (not administered)   glucagon (human recombinant) injection 1 mg (not administered)   insulin aspart U-100 pen 1-10 Units (3 Units Subcutaneous Given 7/20/18 2242)   HYDROcodone-acetaminophen  mg per tablet 1 tablet (not administered)   insulin detemir U-100 pen 15 Units (15 Units Subcutaneous Given 7/20/18 2118)   vancomycin (VANCOCIN) 1,250 mg in dextrose 5 % 250 mL IVPB (not administered)   morphine injection 4 mg (4 mg Intravenous Given 7/20/18 1627)   vancomycin (VANCOCIN) 1,250 mg in dextrose 5 % 250 mL IVPB (1,250 mg Intravenous New Bag 7/20/18 1812)   piperacillin-tazobactam 4.5 g in dextrose 5 % 100 mL IVPB (ready to mix system) (0 g Intravenous Stopped  7/20/18 1810)       Current Discharge Medication List                Medical Decision Making    Medical Decision Making:   Clinical Tests:   Lab Tests: Ordered and Reviewed  Radiological Study: Reviewed and Ordered  Medical Tests: Reviewed and Ordered           Scribe Attestation:   Scribe #1: I performed the above scribed service and the documentation accurately describes the services I performed. I attest to the accuracy of the note.    Attending:   Physician Attestation Statement for Scribe #1: I, JUVENAL Mata/Wade Segura MD, personally performed the services described in this documentation, as scribed by Corie Murillo, in my presence, and it is both accurate and complete.          Clinical Impression       ICD-10-CM ICD-9-CM   1. Cellulitis L03.90 682.9       Disposition:   Disposition: Admitted  Condition: Fair         Wade Segura MD  07/20/18 4227

## 2018-07-21 LAB
ANION GAP SERPL CALC-SCNC: 10 MMOL/L
BUN SERPL-MCNC: 18 MG/DL
CALCIUM SERPL-MCNC: 9.5 MG/DL
CHLORIDE SERPL-SCNC: 101 MMOL/L
CO2 SERPL-SCNC: 22 MMOL/L
CREAT SERPL-MCNC: 1.7 MG/DL
EST. GFR  (AFRICAN AMERICAN): 48 ML/MIN/1.73 M^2
EST. GFR  (NON AFRICAN AMERICAN): 42 ML/MIN/1.73 M^2
GLUCOSE SERPL-MCNC: 189 MG/DL
POCT GLUCOSE: 169 MG/DL (ref 70–110)
POCT GLUCOSE: 186 MG/DL (ref 70–110)
POCT GLUCOSE: 229 MG/DL (ref 70–110)
POCT GLUCOSE: 251 MG/DL (ref 70–110)
POTASSIUM SERPL-SCNC: 3.8 MMOL/L
SODIUM SERPL-SCNC: 133 MMOL/L
VANCOMYCIN SERPL-MCNC: 8.6 UG/ML

## 2018-07-21 PROCEDURE — 25000003 PHARM REV CODE 250: Performed by: INTERNAL MEDICINE

## 2018-07-21 PROCEDURE — 63600175 PHARM REV CODE 636 W HCPCS: Performed by: NURSE PRACTITIONER

## 2018-07-21 PROCEDURE — 36415 COLL VENOUS BLD VENIPUNCTURE: CPT

## 2018-07-21 PROCEDURE — 21400001 HC TELEMETRY ROOM

## 2018-07-21 PROCEDURE — 96372 THER/PROPH/DIAG INJ SC/IM: CPT

## 2018-07-21 PROCEDURE — 25000003 PHARM REV CODE 250: Performed by: PODIATRIST

## 2018-07-21 PROCEDURE — 25000242 PHARM REV CODE 250 ALT 637 W/ HCPCS: Performed by: INTERNAL MEDICINE

## 2018-07-21 PROCEDURE — 80202 ASSAY OF VANCOMYCIN: CPT

## 2018-07-21 PROCEDURE — 11000001 HC ACUTE MED/SURG PRIVATE ROOM

## 2018-07-21 PROCEDURE — 63600175 PHARM REV CODE 636 W HCPCS: Performed by: EMERGENCY MEDICINE

## 2018-07-21 PROCEDURE — 80048 BASIC METABOLIC PNL TOTAL CA: CPT

## 2018-07-21 PROCEDURE — 63600175 PHARM REV CODE 636 W HCPCS: Performed by: INTERNAL MEDICINE

## 2018-07-21 PROCEDURE — 94640 AIRWAY INHALATION TREATMENT: CPT

## 2018-07-21 PROCEDURE — 25000003 PHARM REV CODE 250: Performed by: EMERGENCY MEDICINE

## 2018-07-21 RX ORDER — DIPHENHYDRAMINE HCL 12.5MG/5ML
12.5 ELIXIR ORAL 3 TIMES DAILY PRN
Status: DISCONTINUED | OUTPATIENT
Start: 2018-07-21 | End: 2018-07-23 | Stop reason: HOSPADM

## 2018-07-21 RX ORDER — HYDRALAZINE HYDROCHLORIDE 20 MG/ML
10 INJECTION INTRAMUSCULAR; INTRAVENOUS EVERY 6 HOURS PRN
Status: DISCONTINUED | OUTPATIENT
Start: 2018-07-21 | End: 2018-07-23 | Stop reason: HOSPADM

## 2018-07-21 RX ORDER — ARFORMOTEROL TARTRATE 15 UG/2ML
15 SOLUTION RESPIRATORY (INHALATION) 2 TIMES DAILY
Status: DISCONTINUED | OUTPATIENT
Start: 2018-07-21 | End: 2018-07-23 | Stop reason: HOSPADM

## 2018-07-21 RX ORDER — BUDESONIDE 0.5 MG/2ML
0.5 INHALANT ORAL 2 TIMES DAILY
Status: DISCONTINUED | OUTPATIENT
Start: 2018-07-21 | End: 2018-07-23 | Stop reason: HOSPADM

## 2018-07-21 RX ORDER — AMLODIPINE BESYLATE 5 MG/1
5 TABLET ORAL DAILY
Status: DISCONTINUED | OUTPATIENT
Start: 2018-07-22 | End: 2018-07-23 | Stop reason: HOSPADM

## 2018-07-21 RX ORDER — FORMOTEROL FUMARATE DIHYDRATE 20 UG/2ML
20 SOLUTION RESPIRATORY (INHALATION) EVERY 12 HOURS
Status: DISCONTINUED | OUTPATIENT
Start: 2018-07-21 | End: 2018-07-21

## 2018-07-21 RX ORDER — MORPHINE SULFATE 4 MG/ML
4 INJECTION, SOLUTION INTRAMUSCULAR; INTRAVENOUS EVERY 4 HOURS PRN
Status: DISCONTINUED | OUTPATIENT
Start: 2018-07-22 | End: 2018-07-23 | Stop reason: HOSPADM

## 2018-07-21 RX ADMIN — VANCOMYCIN HYDROCHLORIDE 1250 MG: 1 INJECTION, POWDER, LYOPHILIZED, FOR SOLUTION INTRAVENOUS at 02:07

## 2018-07-21 RX ADMIN — BUDESONIDE 0.5 MG: 0.5 SUSPENSION RESPIRATORY (INHALATION) at 09:07

## 2018-07-21 RX ADMIN — ARFORMOTEROL TARTRATE 15 MCG: 15 SOLUTION RESPIRATORY (INHALATION) at 09:07

## 2018-07-21 RX ADMIN — COLLAGENASE SANTYL: 250 OINTMENT TOPICAL at 02:07

## 2018-07-21 RX ADMIN — PREDNISONE 5 MG: 5 TABLET ORAL at 08:07

## 2018-07-21 RX ADMIN — HYDROMORPHONE HYDROCHLORIDE 1 MG: 2 INJECTION, SOLUTION INTRAMUSCULAR; INTRAVENOUS; SUBCUTANEOUS at 02:07

## 2018-07-21 RX ADMIN — HYDROMORPHONE HYDROCHLORIDE 1 MG: 2 INJECTION, SOLUTION INTRAMUSCULAR; INTRAVENOUS; SUBCUTANEOUS at 11:07

## 2018-07-21 RX ADMIN — INSULIN ASPART 2 UNITS: 100 INJECTION, SOLUTION INTRAVENOUS; SUBCUTANEOUS at 06:07

## 2018-07-21 RX ADMIN — TACROLIMUS 1.5 MG: 1 CAPSULE ORAL at 08:07

## 2018-07-21 RX ADMIN — TACROLIMUS 1.5 MG: 1 CAPSULE ORAL at 05:07

## 2018-07-21 RX ADMIN — INSULIN ASPART 2 UNITS: 100 INJECTION, SOLUTION INTRAVENOUS; SUBCUTANEOUS at 11:07

## 2018-07-21 RX ADMIN — DIPHENHYDRAMINE HYDROCHLORIDE 12.5 MG: 12.5 SOLUTION ORAL at 09:07

## 2018-07-21 RX ADMIN — AMLODIPINE BESYLATE 2.5 MG: 2.5 TABLET ORAL at 08:07

## 2018-07-21 RX ADMIN — HYDROMORPHONE HYDROCHLORIDE 1 MG: 2 INJECTION, SOLUTION INTRAMUSCULAR; INTRAVENOUS; SUBCUTANEOUS at 06:07

## 2018-07-21 RX ADMIN — INSULIN ASPART 6 UNITS: 100 INJECTION, SOLUTION INTRAVENOUS; SUBCUTANEOUS at 05:07

## 2018-07-21 RX ADMIN — INSULIN DETEMIR 15 UNITS: 100 INJECTION, SOLUTION SUBCUTANEOUS at 09:07

## 2018-07-21 RX ADMIN — HYDROMORPHONE HYDROCHLORIDE 1 MG: 2 INJECTION, SOLUTION INTRAMUSCULAR; INTRAVENOUS; SUBCUTANEOUS at 04:07

## 2018-07-21 RX ADMIN — HYDRALAZINE HYDROCHLORIDE 10 MG: 20 INJECTION, SOLUTION INTRAMUSCULAR; INTRAVENOUS at 05:07

## 2018-07-21 NOTE — PLAN OF CARE
07/21/18 1001   Readmission Questionnaire   At the time of your discharge, did someone talk to you about what your health problems were? Yes   At the time of discharge, did someone talk to you about what to watch out for regarding worsening of your health problem? Yes   At the time of discharge, did someone talk to you about what to do if you experienced worsening of your health problem? Yes   At the time of discharge, did someone talk to you about which medication to take when you left the hospital and which ones to stop taking? Yes   At the time of discharge, did someone talk to you about when and where to follow up with a doctor after you left the hospital? Yes   What do you believe caused you to be sick enough to be re-admitted? (Infection keeps coming back)   How often do you need to have someone help you when you read instructions, pamphlets, or other written material from your doctor or pharmacy? Never   Do you have problems taking your medications as prescribed? No   Do you have any problems affording any of  your prescribed medications? No   Do you have problems obtaining/receiving your medications? No   Does the patient have transportation to healthcare appointments? Yes   Lives With alone   Living Arrangements house   Does the patient have family/friends to help with healtcare needs after discharge? yes   Who are your caregiver(s) and their phone number(s)? (Kecia Sagastume [phone])   Does your caregiver provide all the help you need? No   If no, what kind of help do you need at home? NONE   Are you currently feeling confused? No   Are you currently having problems thinking? No   Are you currently having memory problems? No   Have you felt down, depressed, or hopeless? 0   Have you felt little interest or pleasure in doing things? 0   In the last 7 days, my sleep quality was: fair

## 2018-07-21 NOTE — PROGRESS NOTES
[facility] Hospital Medicine  Progress Note    Patient Name: Lavelle Ladd  MRN: [id number]  Patient Class: IP- Inpatient   Admission Date: 7/20/2018  Length of Stay: 1 days  Attending Physician: Tahir Carmichael, *  Primary Care Provider: Rishabh Esteban MD        Subjective:     Principal Problem:Cellulitis    HPI:   62 year old male with type II diabetes mellitus, diabetic neuropathy, chronic back pain, right above the knee amputation and history of kidney transplant on 5/21/16, on Prograf and prednisone presented to the er  Due to left foot infection and cellulitis. Pt was referred to the ED by podiatry for IV abx. Symptoms are constant and moderate in severity. No mitigating or exacerbating factors reported. Associated sxs include L foot pain, L foot swelling. Patient denies any falls, trauma, fever, chills, worsening extremity weakness/numbness, and all other sxs at this time. Pt had a debridement of his L great toe on 7/9. No further complaints or concerns at this time.   He was  Recently d/c  from [facility] : Per D/C summary :On 7/6 patient was admitted to Medicine secondary to Gangrene of Left great toe.  Xray of left foot showed no acute findings.  No change in the incompletely healed fracture of the base of the proximal phalanx of the 2nd toe.  Arterial US showed diffuse atherosclerotic change of the left lower extremity arteries.  No findings suspicious for focal stenosis or occlusion.  He has a h/o PVD s/p Angiogram with balloon angioplasty and atherectomy to LLE per Dr. Mcdonald in June.  Podiatry consulted in the ER and per Dr. Magaña he will likely need amputation.  Vascular consult pending.  Started on Vanc and Zosyn.  Nephrology consulted given h/o kidney transplant.  BC continue to show NGTD.  As of 7/8 patient has been evaluated by vascular surgeon Dr. Uribe.  Per vascular, left foot arterial blood flow is adequate.  Ok for podiatry to proceed with OR for aggressive local  debridement/amputation.  Reconsult if any further assistance is required.  Will d/w Dr. Magaña to determine POC moving forward.  Continue local care per podiatry recs for now.  As of 7/9 patient is s/p I and D per Dr. Magaña today.  Wound culture pending.  As of 7/10 patient evaluated by Dr. Guthrie with podiatry and cleared for DC from their standpoint.  Per podiatry patient will continue local wound care daily and was instructed to f/u with Dr. Magaña later this week for a wound re-check and verbalized + understanding.  Per podiatry, patient can complete 7 days of oral Zyvox to cover for MRSA.  BC and wound culture continues to show NGTD.  Case d/w Dr. Mueller.  Patient seen and examined and deemed medically stable to DC home today.  He was offered home health to continue wound care, but declined.  He will be instructed on daily wound care per nursing prior to DC.  Medications reconicled for DC home.  Also instructed to f/u with his PCP within 3 days and with Nephrology within 2-3 weeks and verbalized + understanding.            Hospital Course:  61 y/o  Wm admitted with a dx of left foot   Cellulitis  And gangrene .  He was started on IVAB . Podiatrist was consulted from the ER     Interval History: Pt  Was seen and examined at bedside . He denies any complaint for today .     Review of Systems   Constitutional: Negative for activity change, appetite change, chills, fatigue and fever.   HENT: Negative.  Negative for congestion.    Eyes: Negative for pain, redness and visual disturbance.   Respiratory: Negative for cough, shortness of breath and wheezing.    Cardiovascular: Positive for leg swelling. Negative for chest pain and palpitations.   Gastrointestinal: Negative for abdominal pain, constipation, diarrhea, nausea and vomiting.   Genitourinary: Negative.    Musculoskeletal: Positive for back pain and gait problem.   Skin: Positive for color change and wound.   Neurological: Positive for numbness. Negative for  dizziness and light-headedness.        + numbness to bilateral feet   Psychiatric/Behavioral: Negative for agitation and confusion. The patient is not nervous/anxious.      Objective:     Vital Signs (Most Recent):  Temp: 97.9 °F (36.6 °C) (07/21/18 0840)  Pulse: 79 (07/21/18 0840)  Resp: 20 (07/21/18 0840)  BP: 115/60 (07/21/18 0840)  SpO2: 100 % (07/21/18 0840) Vital Signs (24h Range):  Temp:  [97.9 °F (36.6 °C)-98.5 °F (36.9 °C)] 97.9 °F (36.6 °C)  Pulse:  [75-98] 79  Resp:  [16-20] 20  SpO2:  [94 %-100 %] 100 %  BP: (115-185)/() 115/60     Weight: 101 kg (222 lb 10.6 oz) (with prosthesis)  Body mass index is 31.06 kg/m².    Intake/Output Summary (Last 24 hours) at 07/21/18 1021  Last data filed at 07/21/18 0900   Gross per 24 hour   Intake              540 ml   Output                0 ml   Net              540 ml      Physical Exam   Constitutional: He is oriented to person, place, and time. He appears well-developed and well-nourished. No distress.   HENT:   Head: Normocephalic and atraumatic.   Eyes: Conjunctivae and EOM are normal. Right eye exhibits no discharge.   Neck: Normal range of motion. Neck supple. No tracheal deviation present. No thyromegaly present.   Cardiovascular: Normal rate, regular rhythm and normal heart sounds.  Exam reveals no gallop and no friction rub.    No murmur heard.  Pulmonary/Chest: Effort normal and breath sounds normal. He has no wheezes. He has no rales.   Abdominal: Soft. Bowel sounds are normal. He exhibits no mass. There is no tenderness. There is no rebound and no guarding.   Musculoskeletal: He exhibits edema.   Left toe gangrene , s/p right BKA, mild edema to left foot   Lymphadenopathy:     He has no cervical adenopathy.   Neurological: He is alert and oriented to person, place, and time.   Skin: Skin is warm. No rash noted. He is not diaphoretic. There is erythema.   Surgical DSG to Left foot CDI   Psychiatric: He has a normal mood and affect. His behavior is  normal.   Nursing note and vitals reviewed.      Significant Labs:   Recent Lab Results       07/21/18  0624 07/20/18  2240 07/20/18  1744 07/20/18  1433 07/20/18  1415      Procalcitonin          Albumin          Alkaline Phosphatase          ALT          Anion Gap          Appearance, UA    Clear      AST          Bacteria, UA    Rare      Baso #          Basophil%          Bilirubin (UA)    Negative      Total Bilirubin          Blood Culture, Routine     No Growth to date[P]     BUN, Bld          Calcium          Chloride          CO2          Color, UA    Yellow      Creatinine          Differential Method          eGFR if           eGFR if non           Eos #          Eosinophil%          Glucose          Glucose, UA    3+(A)      Gran # (ANC)          Gran%          Hematocrit          Hemoglobin          Hyaline Casts, UA    1      Ketones, UA    Negative      Lactate, Massimo   1.2  Comment:  Falsely low lactic acid results can be found in samples   containing >=13.0 mg/dL total bilirubin and/or >=3.5 mg/dL   direct bilirubin.         Leukocytes, UA    Negative      Lymph #          Lymph%          MCH          MCHC          MCV          Microscopic Comment    SEE COMMENT  Comment:  Other formed elements not mentioned in the report are not   present in the microscopic examination.         Mono #          Mono%          MPV          Nitrite, UA    Negative      Occult Blood UA    Trace(A)      pH, UA    7.0      Platelets          POCT Glucose 169(H) 269(H)        Potassium          Total Protein          Protein, UA    1+  Comment:  Recommend a 24 hour urine protein or a urine   protein/creatinine ratio if globulin induced proteinuria is  clinically suspected.  (A)      RBC          RBC, UA    1      RDW          Sodium          Specific [place], UA    1.015      Specimen UA    Urine, Clean Catch      Squam Epithel, UA    1      Urobilinogen, UA    Negative      WBC, UA    4       WBC          Yeast, UA    Few(A)                  07/20/18  1359 07/20/18  1345      Procalcitonin 0.11  Comment:  Please re-baseline procalcitonin if a patient is transferred from  other facilities to [facility].  A concentration < 0.25 ng/mL represents a low risk bacterial   infection.  Procalcitonin may not be accurate among patients with localized   infection, recent trauma or major surgery, immunosuppressed state,   invasive fungal infection, renal dysfunction. Decisions regarding   initiation or continuation of antibiotic therapy should not be based   solely on procalcitonin levels.        Albumin  3.6     Alkaline Phosphatase  92     ALT  16     Anion Gap  12     Appearance, UA       AST  12     Bacteria, UA       Baso #  0.01     Basophil%  0.1     Bilirubin (UA)       Total Bilirubin  0.6  Comment:  For infants and newborns, interpretation of results should be based  on gestational age, weight and in agreement with clinical  observations.  Premature Infant recommended reference ranges:  Up to 24 hours.............<8.0 mg/dL  Up to 48 hours............<12.0 mg/dL  3-5 days..................<15.0 mg/dL  6-29 days.................<15.0 mg/dL       Blood Culture, Routine  No Growth to date[P]     BUN, Bld  19     Calcium  9.5     Chloride  100     CO2  23     Color, UA       Creatinine  1.8(H)     Differential Method  Automated     eGFR if African American  45(A)     eGFR if non   39  Comment:  Calculation used to obtain the estimated glomerular filtration  rate (eGFR) is the CKD-EPI equation.   (A)     Eos #  0.1     Eosinophil%  0.6     Glucose  280(H)     Glucose, UA       Gran # (ANC)  6.2     Gran%  70.3     Hematocrit  44.1     Hemoglobin  14.8     Hyaline Casts, UA       Ketones, UA       Lactate, Massimo  1.3  Comment:  Falsely low lactic acid results can be found in samples   containing >=13.0 mg/dL total bilirubin and/or >=3.5 mg/dL   direct bilirubin.       Leukocytes, UA       Lymph  #  1.6     Lymph%  18.4     MCH  31.0     MCHC  33.6     MCV  92     Microscopic Comment       Mono #  0.9     Mono%  10.6     MPV  10.1     Nitrite, UA       Occult Blood UA       pH, UA       Platelets  161     POCT Glucose       Potassium  4.2     Total Protein  7.4     Protein, UA       RBC  4.78     RBC, UA       RDW  12.7     Sodium  135(L)     Specific [place], UA       Specimen UA       Squam Epithel, UA       Urobilinogen, UA       WBC, UA       WBC  8.85     Yeast, UA             Significant Imaging: I have reviewed all pertinent imaging results/findings within the past 24 hours.    Assessment/Plan:      * Cellulitis      Cellulitis and gangrene  Of the right foot   Cont broad spectrum ivab  Podiatrist consulted           PVD (peripheral vascular disease)    Cont asa         H/O amputation      Cont current tx         Chronic kidney disease (CKD), stage III (moderate)      Monitor kidney function           Chronic obstructive pulmonary disease      Resume laba/ihc         Hyperlipidemia    Cont statin           Type 2 diabetes mellitus with hyperglycemia, with long-term current use of insulin      Start levemir and iss        Prophylactic immunotherapy               donor kidney transplant for DM 14    Cont prograf            VTE Risk Mitigation         Ordered     IP VTE HIGH RISK PATIENT  Once      18     Place GOPI hose  Until discontinued      18     Place sequential compression device  Until discontinued      18              Tahir Carmichael MD  Department of Hospital Medicine   [facility]

## 2018-07-21 NOTE — PLAN OF CARE
Assessment completed.  Met with the patient/ family. CM explained and left info in blue transition of care folder regarding Advance Directives, Living Will ( FULL CODE ) ,  Pamphlet on D/C planning on admission and Pharmacy bedside delivery.  The role of CM explained for  transitions of care/ discharge planning. Patient stated he was told to go  to the ED by podiatry for IV abx Patient has family support of his sister. CM asked patient about having assistance with with wd care for HH. This is his 5 th admit over a few months. A skilled RN would be able to immediately assess for s/s infection and treatment can be done on outpatient bases instead of inpatient and she can as well assist with your technique for wd care changes. Patient again refused HH but really needs to reconsider assistance or possible skilled placement. Patient has [employer]  insurance. Patient has no needs at this time. CM to f/u for safe transition     07/21/18 0900   Discharge Assessment   Assessment Type Discharge Planning Assessment   Confirmed/corrected address and phone number on facesheet? Yes   Assessment information obtained from? Patient;Medical Record   Expected Length of Stay (days) (TBD)   Communicated expected length of stay with patient/caregiver yes   Prior to hospitilization cognitive status: Alert/Oriented   Prior to hospitalization functional status: Assistive Equipment  (right leg Prothesis)   Current cognitive status: Alert/Oriented   Current Functional Status: Assistive Equipment;Needs Assistance   Lives With alone   Able to Return to Prior Arrangements unable to determine at this time (comments)   Is patient able to care for self after discharge? Unable to determine at this time (comments)   Patient's perception of discharge disposition home or selfcare   Readmission Within The Last 30 Days previous discharge plan unsuccessful   If yes, most recent facility name: ([facility])   Patient currently being followed by  outpatient case management? No   Patient currently receives home health services? No   Patient currently receives any other outside agency services? No   Equipment Currently Used at Home cane, straight;walker, standard;wheelchair  (right leg prothesis)   Do you have any problems affording any of your prescribed medications? No   Is the patient taking medications as prescribed? yes   Does the patient have transportation home? Yes   Transportation Available car;family or friend will provide   Does the patient receive services at the [facility]? No   Patient/Family In Agreement With Plan yes

## 2018-07-21 NOTE — PLAN OF CARE
Problem: Patient Care Overview  Goal: Plan of Care Review  Outcome: Ongoing (interventions implemented as appropriate)  Remained free from injury. Tolerating diet. Insulin given per sliding scale. Pain controlled with iv medication. Continuing iv abx. Wound care performed this shift. NSR on monitor. 12 hour chart check complete.

## 2018-07-21 NOTE — HOSPITAL COURSE
Admitted with a dx of left great toe cellulitis and gangrene.  He was started on IVAB.  Podiatrist was consulted from the ER.  Continuing wound care and Vancomycin.  Infectious Disease evaluation.  Discharge plan for home daily wound care and Cefepime 2 g twice daily for 6 weeks.

## 2018-07-21 NOTE — H&P
[facility] Hospital Medicine  History & Physical    Patient Name: Lavelle Ladd  MRN: [id number]  Admission Date: 7/20/2018  Attending Physician: Wade Segura MD   Primary Care Provider: Rishabh Esteban MD         Patient information was obtained from patient and ER records.     Subjective:     Principal Problem:Cellulitis    Chief Complaint:   Chief Complaint   Patient presents with    Wound Infection     sent here by Dr. [facility] podiatrist for IV antibiotics        HPI:  62 year old male with type II diabetes mellitus, diabetic neuropathy, chronic back pain, right above the knee amputation and history of kidney transplant on 5/21/16, on Prograf and prednisone presented to the er  Due to left foot infection and cellulitis. Pt was referred to the ED by podiatry for IV abx. Symptoms are constant and moderate in severity. No mitigating or exacerbating factors reported. Associated sxs include L foot pain, L foot swelling. Patient denies any falls, trauma, fever, chills, worsening extremity weakness/numbness, and all other sxs at this time. Pt had a debridement of his L great toe on 7/9. No further complaints or concerns at this time.   He was  Recently d/c  from [facility] : Per D/C summary :On 7/6 patient was admitted to Medicine secondary to Gangrene of Left great toe.  Xray of left foot showed no acute findings.  No change in the incompletely healed fracture of the base of the proximal phalanx of the 2nd toe.  Arterial US showed diffuse atherosclerotic change of the left lower extremity arteries.  No findings suspicious for focal stenosis or occlusion.  He has a h/o PVD s/p Angiogram with balloon angioplasty and atherectomy to LLE per Dr. Mcdonald in June.  Podiatry consulted in the ER and per Dr. Magaña he will likely need amputation.  Vascular consult pending.  Started on Vanc and Zosyn.  Nephrology consulted given h/o kidney transplant.  BC continue to show NGTD.  As of 7/8 patient has been  evaluated by vascular surgeon Dr. Uribe.  Per vascular, left foot arterial blood flow is adequate.  Ok for podiatry to proceed with OR for aggressive local debridement/amputation.  Reconsult if any further assistance is required.  Will d/w Dr. Magaña to determine POC moving forward.  Continue local care per podiatry recs for now.  As of  patient is s/p I and D per Dr. Magaña today.  Wound culture pending.  As of 7/10 patient evaluated by Dr. Guthrie with podiatry and cleared for DC from their standpoint.  Per podiatry patient will continue local wound care daily and was instructed to f/u with Dr. Magaña later this week for a wound re-check and verbalized + understanding.  Per podiatry, patient can complete 7 days of oral Zyvox to cover for MRSA.  BC and wound culture continues to show NGTD.  Case d/w Dr. Mueller.  Patient seen and examined and deemed medically stable to DC home today.  He was offered home health to continue wound care, but declined.  He will be instructed on daily wound care per nursing prior to DC.  Medications reconicled for DC home.  Also instructed to f/u with his PCP within 3 days and with Nephrology within 2-3 weeks and verbalized + understanding.            Past Medical History:   Diagnosis Date    DINORAH (acute kidney injury) 2016    Arthritis     Chronic obstructive pulmonary disease 2016    Coronary artery disease involving native coronary artery of native heart without angina pectoris 2016     donor kidney transplant for DM 16     Induction with Thymo x3 and IV solumedrol to total 875mg  Kidney Biopsy  2016: 16 glomeruli, ACR type 1 AVR type 2, significant microcirculatory changes, c4d negative, No DSA, 5 to10% fibrosis. Treated with thymo x8 2016- no rejection      Diastolic heart failure     Encounter for blood transfusion     ESRD on RRT since 10/2013 10/29/2013    Biopsy proven diabetic nephropathy and lymphoplasmacytic interstitial infiltrate  "not c/w with AIN (ddx sjogrens or assoc with tamm-horsefall protein extravasation)     GERD (gastroesophageal reflux disease)     History of hepatitis C, s/p successful Rx w/ SVR12 - 4/2017 4/5/2017    Completed 12 weeks harvoni w/ SVR    Hyperlipidemia     Hypertension     Prophylactic immunotherapy     Proteinuria     PVD (peripheral vascular disease) 6/26/2017    RLE BKA CT 12/11/16 Extensive atherosclerotic disease of the aorta and mesenteric arteries.     Renal hypertension     Type 2 diabetes mellitus with diabetic neuropathy, with long-term current use of insulin 12/1/2016    Vitamin B12 deficiency        Past Surgical History:   Procedure Laterality Date    AORTOGRAPHY WITH SERIALOGRAPHY N/A 6/14/2018    Procedure: LEFT LEG ANGIOGRAM;  Surgeon: Donal Mcdonald MD;  Location: [facility] CATH LAB;  Service: Vascular;  Laterality: N/A;    av bovine graft      Left UE    AV FISTULA PLACEMENT      left UE    CARDIAC CATHETERIZATION  02/2015    KIDNEY TRANSPLANT  05/21/2016    LEG AMPUTATION THROUGH KNEE  2011    right LE, started as nail puncture leading to diabetic ulcer       Review of patient's allergies indicates:  No Known Allergies    No current facility-administered medications on file prior to encounter.      Current Outpatient Prescriptions on File Prior to Encounter   Medication Sig    BD INSULIN PEN NEEDLE UF SHORT 31 gauge x 5/16" Ndle     BD INSULIN SYRINGE ULTRA-FINE 1 mL 31 gauge x 5/16 Syrg USE ONE AS DIRECTED FOUR TIMES DAILY WITH MEALS AND AT BEDTIME    blood sugar diagnostic Strp 1 each by Misc.(Non-Drug; Combo Route) route 3 (three) times daily.    blood-glucose meter kit Use as instructed    ciprofloxacin HCl (CIPRO) 500 MG tablet Take 1 tablet (500 mg total) by mouth every 12 (twelve) hours. for 10 days    ergocalciferol (ERGOCALCIFEROL) 50,000 unit Cap Take 1 capsule (50,000 Units total) by mouth every 7 days. Take on Mondays    HYDROcodone-acetaminophen (NORCO)  mg per " "tablet Take 1 tablet by mouth every 6 (six) hours as needed.    insulin NPH (NOVOLIN N) 100 unit/mL injection Take 25 units subq with breakfast and 15 units at bedtime    lancets Misc 1 each by Misc.(Non-Drug; Combo Route) route 3 (three) times daily.    NOVOLIN R REGULAR U-100 INSULN 100 unit/mL injection INJECT 6 UNITS WITH BREAKFAST AND 8 UNITS WITH DINNER, SUBCUTANEOUSLY 30 MIN BEFORE MEAL.    pen needle, diabetic (EASY COMFORT PEN NEEDLES) 32 gauge x 5/32" Ndle Inject 1 each into the skin 3 (three) times daily.    pen needle, diabetic 31 gauge x 1/4" Ndle 1 each by Misc.(Non-Drug; Combo Route) route 4 (four) times daily.    predniSONE (DELTASONE) 5 MG tablet Take 1 tablet (5 mg total) by mouth once daily.    sodium bicarbonate 650 MG tablet Take 4 tablets (2,600 mg total) by mouth 2 (two) times daily.    tacrolimus (PROGRAF) 0.5 MG Cap Take 3 capsules (1.5 mg total) by mouth every 12 (twelve) hours. Z94.0 Kidney Transplant    zolpidem (AMBIEN) 5 MG Tab Take 2 tablets (10 mg total) by mouth nightly as needed.    amLODIPine (NORVASC) 2.5 MG tablet Take 1 tablet (2.5 mg total) by mouth once daily.    aspirin (ECOTRIN) 81 MG EC tablet Take 1 tablet (81 mg total) by mouth once daily.    budesonide-formoterol 160-4.5 mcg (SYMBICORT) 160-4.5 mcg/actuation HFAA Inhale 2 puffs into the lungs every 12 (twelve) hours. Wash out mouth after using    inhalation device (BREATHERITE VALVED MDI CHAMBER) Use as directed for inhalation.    ondansetron (ZOFRAN-ODT) 4 MG TbDL Take 1 tablet (4 mg total) by mouth every 8 (eight) hours as needed.     Family History     Problem Relation (Age of Onset)    Cancer Father    Diabetes Father    Heart failure Father, Mother    Stroke Father        Social History Main Topics    Smoking status: Former Smoker     Packs/day: 1.00     Years: 40.00     Quit date: 1/11/2013    Smokeless tobacco: Never Used    Alcohol use 3.6 oz/week     6 Cans of beer per week      Comment: 6 " beers/week    Drug use: No    Sexual activity: No     Review of Systems   Constitutional: Negative for activity change, appetite change, chills, fatigue and fever.   HENT: Negative.  Negative for congestion.    Eyes: Negative for pain, redness and visual disturbance.   Respiratory: Negative for cough, shortness of breath and wheezing.    Cardiovascular: Positive for leg swelling. Negative for chest pain and palpitations.   Gastrointestinal: Negative for abdominal pain, constipation, diarrhea, nausea and vomiting.   Genitourinary: Negative.    Musculoskeletal: Positive for back pain and gait problem.   Skin: Positive for color change and wound.   Neurological: Positive for numbness. Negative for dizziness and light-headedness.        + numbness to bilateral feet   Psychiatric/Behavioral: Negative for agitation and confusion. The patient is not nervous/anxious.      Objective:     Vital Signs (Most Recent):  Temp: 98.3 °F (36.8 °C) (07/20/18 1800)  Pulse: 83 (07/20/18 1800)  Resp: 18 (07/20/18 1800)  BP: (!) 185/96 (07/20/18 1800)  SpO2: 95 % (07/20/18 1800) Vital Signs (24h Range):  Temp:  [98 °F (36.7 °C)-98.5 °F (36.9 °C)] 98.3 °F (36.8 °C)  Pulse:  [75-98] 83  Resp:  [16-20] 18  SpO2:  [95 %-97 %] 95 %  BP: (118-185)/() 185/96     Weight: 101 kg (222 lb 10.6 oz) (with prosthesis)  Body mass index is 31.06 kg/m².    Physical Exam   Constitutional: He is oriented to person, place, and time. He appears well-developed and well-nourished. No distress.   HENT:   Head: Normocephalic and atraumatic.   Eyes: Conjunctivae and EOM are normal. Right eye exhibits no discharge.   Neck: Normal range of motion. Neck supple. No tracheal deviation present. No thyromegaly present.   Cardiovascular: Normal rate, regular rhythm and normal heart sounds.  Exam reveals no gallop and no friction rub.    No murmur heard.  Pulmonary/Chest: Effort normal and breath sounds normal. He has no wheezes. He has no rales.   Abdominal: Soft.  Bowel sounds are normal. He exhibits no mass. There is no tenderness. There is no rebound and no guarding.   Musculoskeletal: He exhibits edema.   Left toe gangrene , s/p right BKA, mild edema to left foot   Lymphadenopathy:     He has no cervical adenopathy.   Neurological: He is alert and oriented to person, place, and time.   Skin: Skin is warm. No rash noted. He is not diaphoretic. There is erythema.   Surgical DSG to Left foot CDI   Psychiatric: He has a normal mood and affect. His behavior is normal.   Nursing note and vitals reviewed.        CRANIAL NERVES     CN III, IV, VI   Extraocular motions are normal.        Significant Labs:   Recent Lab Results       07/20/18  1744 07/20/18  1433 07/20/18  1359 07/20/18  1345      Procalcitonin   0.11  Comment:  Please re-baseline procalcitonin if a patient is transferred from  other facilities to [facility].  A concentration < 0.25 ng/mL represents a low risk bacterial   infection.  Procalcitonin may not be accurate among patients with localized   infection, recent trauma or major surgery, immunosuppressed state,   invasive fungal infection, renal dysfunction. Decisions regarding   initiation or continuation of antibiotic therapy should not be based   solely on procalcitonin levels.        Albumin    3.6     Alkaline Phosphatase    92     ALT    16     Anion Gap    12     Appearance, UA  Clear       AST    12     Bacteria, UA  Rare       Baso #    0.01     Basophil%    0.1     Bilirubin (UA)  Negative       Total Bilirubin    0.6  Comment:  For infants and newborns, interpretation of results should be based  on gestational age, weight and in agreement with clinical  observations.  Premature Infant recommended reference ranges:  Up to 24 hours.............<8.0 mg/dL  Up to 48 hours............<12.0 mg/dL  3-5 days..................<15.0 mg/dL  6-29 days.................<15.0 mg/dL       BUN, Bld    19     Calcium    9.5     Chloride    100     CO2    23      Color, UA  Yellow       Creatinine    1.8(H)     Differential Method    Automated     eGFR if     45(A)     eGFR if non     39  Comment:  Calculation used to obtain the estimated glomerular filtration  rate (eGFR) is the CKD-EPI equation.   (A)     Eos #    0.1     Eosinophil%    0.6     Glucose    280(H)     Glucose, UA  3+(A)       Gran # (ANC)    6.2     Gran%    70.3     Hematocrit    44.1     Hemoglobin    14.8     Hyaline Casts, UA  1       Ketones, UA  Negative       Lactate, Massimo 1.2  Comment:  Falsely low lactic acid results can be found in samples   containing >=13.0 mg/dL total bilirubin and/or >=3.5 mg/dL   direct bilirubin.     1.3  Comment:  Falsely low lactic acid results can be found in samples   containing >=13.0 mg/dL total bilirubin and/or >=3.5 mg/dL   direct bilirubin.       Leukocytes, UA  Negative       Lymph #    1.6     Lymph%    18.4     MCH    31.0     MCHC    33.6     MCV    92     Microscopic Comment  SEE COMMENT  Comment:  Other formed elements not mentioned in the report are not   present in the microscopic examination.          Mono #    0.9     Mono%    10.6     MPV    10.1     Nitrite, UA  Negative       Occult Blood UA  Trace(A)       pH, UA  7.0       Platelets    161     Potassium    4.2     Total Protein    7.4     Protein, UA  1+  Comment:  Recommend a 24 hour urine protein or a urine   protein/creatinine ratio if globulin induced proteinuria is  clinically suspected.  (A)       RBC    4.78     RBC, UA  1       RDW    12.7     Sodium    135(L)     Specific [place], UA  1.015       Specimen UA  Urine, Clean Catch       Squam Epithel, UA  1       Urobilinogen, UA  Negative       WBC, UA  4       WBC    8.85     Yeast, UA  Few(A)             Significant Imaging: I have reviewed all pertinent imaging results/findings within the past 24 hours.    Assessment/Plan:     * Cellulitis      Cellulitis and gangrene  Of the right foot   Cont broad spectrum  ivab  Podiatrist consulted           H/O amputation      Cont current tx         Chronic kidney disease (CKD), stage III (moderate)      Monitor kidney function           Chronic obstructive pulmonary disease      Resume laba/ihc         Hyperlipidemia    Cont statin           Type 2 diabetes mellitus with hyperglycemia, with long-term current use of insulin      Start levemir and iss         donor kidney transplant for DM 16    Cont prograf            VTE Risk Mitigation     None             Tahir Carmichael MD  Department of Hospital Medicine   [facility]

## 2018-07-21 NOTE — SUBJECTIVE & OBJECTIVE
Interval History: Pt  Was seen and examined at bedside . He denies any complaint for today .     Review of Systems   Constitutional: Negative for activity change, appetite change, chills, fatigue and fever.   HENT: Negative.  Negative for congestion.    Eyes: Negative for pain, redness and visual disturbance.   Respiratory: Negative for cough, shortness of breath and wheezing.    Cardiovascular: Positive for leg swelling. Negative for chest pain and palpitations.   Gastrointestinal: Negative for abdominal pain, constipation, diarrhea, nausea and vomiting.   Genitourinary: Negative.    Musculoskeletal: Positive for back pain and gait problem.   Skin: Positive for color change and wound.   Neurological: Positive for numbness. Negative for dizziness and light-headedness.        + numbness to bilateral feet   Psychiatric/Behavioral: Negative for agitation and confusion. The patient is not nervous/anxious.      Objective:     Vital Signs (Most Recent):  Temp: 97.9 °F (36.6 °C) (07/21/18 0840)  Pulse: 79 (07/21/18 0840)  Resp: 20 (07/21/18 0840)  BP: 115/60 (07/21/18 0840)  SpO2: 100 % (07/21/18 0840) Vital Signs (24h Range):  Temp:  [97.9 °F (36.6 °C)-98.5 °F (36.9 °C)] 97.9 °F (36.6 °C)  Pulse:  [75-98] 79  Resp:  [16-20] 20  SpO2:  [94 %-100 %] 100 %  BP: (115-185)/() 115/60     Weight: 101 kg (222 lb 10.6 oz) (with prosthesis)  Body mass index is 31.06 kg/m².    Intake/Output Summary (Last 24 hours) at 07/21/18 1021  Last data filed at 07/21/18 0900   Gross per 24 hour   Intake              540 ml   Output                0 ml   Net              540 ml      Physical Exam   Constitutional: He is oriented to person, place, and time. He appears well-developed and well-nourished. No distress.   HENT:   Head: Normocephalic and atraumatic.   Eyes: Conjunctivae and EOM are normal. Right eye exhibits no discharge.   Neck: Normal range of motion. Neck supple. No tracheal deviation present. No thyromegaly present.    Cardiovascular: Normal rate, regular rhythm and normal heart sounds.  Exam reveals no gallop and no friction rub.    No murmur heard.  Pulmonary/Chest: Effort normal and breath sounds normal. He has no wheezes. He has no rales.   Abdominal: Soft. Bowel sounds are normal. He exhibits no mass. There is no tenderness. There is no rebound and no guarding.   Musculoskeletal: He exhibits edema.   Left toe gangrene , s/p right BKA, mild edema to left foot   Lymphadenopathy:     He has no cervical adenopathy.   Neurological: He is alert and oriented to person, place, and time.   Skin: Skin is warm. No rash noted. He is not diaphoretic. There is erythema.   Surgical DSG to Left foot CDI   Psychiatric: He has a normal mood and affect. His behavior is normal.   Nursing note and vitals reviewed.      Significant Labs:   Recent Lab Results       07/21/18  0624 07/20/18  2240 07/20/18  1744 07/20/18  1433 07/20/18  1415      Procalcitonin          Albumin          Alkaline Phosphatase          ALT          Anion Gap          Appearance, UA    Clear      AST          Bacteria, UA    Rare      Baso #          Basophil%          Bilirubin (UA)    Negative      Total Bilirubin          Blood Culture, Routine     No Growth to date[P]     BUN, Bld          Calcium          Chloride          CO2          Color, UA    Yellow      Creatinine          Differential Method          eGFR if           eGFR if non           Eos #          Eosinophil%          Glucose          Glucose, UA    3+(A)      Gran # (ANC)          Gran%          Hematocrit          Hemoglobin          Hyaline Casts, UA    1      Ketones, UA    Negative      Lactate, Massimo   1.2  Comment:  Falsely low lactic acid results can be found in samples   containing >=13.0 mg/dL total bilirubin and/or >=3.5 mg/dL   direct bilirubin.         Leukocytes, UA    Negative      Lymph #          Lymph%          MCH          MCHC          MCV           Microscopic Comment    SEE COMMENT  Comment:  Other formed elements not mentioned in the report are not   present in the microscopic examination.         Mono #          Mono%          MPV          Nitrite, UA    Negative      Occult Blood UA    Trace(A)      pH, UA    7.0      Platelets          POCT Glucose 169(H) 269(H)        Potassium          Total Protein          Protein, UA    1+  Comment:  Recommend a 24 hour urine protein or a urine   protein/creatinine ratio if globulin induced proteinuria is  clinically suspected.  (A)      RBC          RBC, UA    1      RDW          Sodium          Specific [place], UA    1.015      Specimen UA    Urine, Clean Catch      Squam Epithel, UA    1      Urobilinogen, UA    Negative      WBC, UA    4      WBC          Yeast, UA    Few(A)                  07/20/18  1359 07/20/18  1345      Procalcitonin 0.11  Comment:  Please re-baseline procalcitonin if a patient is transferred from  other facilities to [facility].  A concentration < 0.25 ng/mL represents a low risk bacterial   infection.  Procalcitonin may not be accurate among patients with localized   infection, recent trauma or major surgery, immunosuppressed state,   invasive fungal infection, renal dysfunction. Decisions regarding   initiation or continuation of antibiotic therapy should not be based   solely on procalcitonin levels.        Albumin  3.6     Alkaline Phosphatase  92     ALT  16     Anion Gap  12     Appearance, UA       AST  12     Bacteria, UA       Baso #  0.01     Basophil%  0.1     Bilirubin (UA)       Total Bilirubin  0.6  Comment:  For infants and newborns, interpretation of results should be based  on gestational age, weight and in agreement with clinical  observations.  Premature Infant recommended reference ranges:  Up to 24 hours.............<8.0 mg/dL  Up to 48 hours............<12.0 mg/dL  3-5 days..................<15.0 mg/dL  6-29 days.................<15.0 mg/dL       Blood Culture,  Routine  No Growth to date[P]     BUN, Bld  19     Calcium  9.5     Chloride  100     CO2  23     Color, UA       Creatinine  1.8(H)     Differential Method  Automated     eGFR if African American  45(A)     eGFR if non   39  Comment:  Calculation used to obtain the estimated glomerular filtration  rate (eGFR) is the CKD-EPI equation.   (A)     Eos #  0.1     Eosinophil%  0.6     Glucose  280(H)     Glucose, UA       Gran # (ANC)  6.2     Gran%  70.3     Hematocrit  44.1     Hemoglobin  14.8     Hyaline Casts, UA       Ketones, UA       Lactate, Massimo  1.3  Comment:  Falsely low lactic acid results can be found in samples   containing >=13.0 mg/dL total bilirubin and/or >=3.5 mg/dL   direct bilirubin.       Leukocytes, UA       Lymph #  1.6     Lymph%  18.4     MCH  31.0     MCHC  33.6     MCV  92     Microscopic Comment       Mono #  0.9     Mono%  10.6     MPV  10.1     Nitrite, UA       Occult Blood UA       pH, UA       Platelets  161     POCT Glucose       Potassium  4.2     Total Protein  7.4     Protein, UA       RBC  4.78     RBC, UA       RDW  12.7     Sodium  135(L)     Specific [place], UA       Specimen UA       Squam Epithel, UA       Urobilinogen, UA       WBC, UA       WBC  8.85     Yeast, UA             Significant Imaging: I have reviewed all pertinent imaging results/findings within the past 24 hours.

## 2018-07-21 NOTE — PLAN OF CARE
Problem: Patient Care Overview  Goal: Plan of Care Review  Outcome: Ongoing (interventions implemented as appropriate)  Pt refused full skin assessment. VSS. NSR on monitor. Pt repositions independently. Prosthesis to RLE  Fall precautions in place. Call light and personal items within reach. Educated patient on side effects of medication administered. Pt verbalized understanding. 24 hour order check done.  Will continue to monitor.

## 2018-07-21 NOTE — CONSULTS
Subjective:     Patient ID: Lavelle Ladd is a 62 y.o. male.    Chief Complaint: Wound Infection (sent here by Dr. [facility] podiatrist for IV antibiotics)    Lavelle is a 62 y.o. male who presents to the ER for evaluation and treatment of high risk feet. Lavelle has a past medical history of DINORAH (acute kidney injury) (2016); Arthritis; Chronic obstructive pulmonary disease (2016); Coronary artery disease involving native coronary artery of native heart without angina pectoris (2016);  donor kidney transplant for DM 16; Diastolic heart failure; Encounter for blood transfusion; ESRD on RRT since 10/2013 (10/29/2013); GERD (gastroesophageal reflux disease); History of hepatitis C, s/p successful Rx w/ SVR12 - 2017 (2017); Hyperlipidemia; Hypertension; Prophylactic immunotherapy; Proteinuria; PVD (peripheral vascular disease) (2017); Renal hypertension; Type 2 diabetes mellitus with diabetic neuropathy, with long-term current use of insulin (2016); and Vitamin B12 deficiency. The patient was sent from my clinic office yesterday due to left foot cellulitis and acute osteomyelitis. Patient seen at bedside eating lunch. Patient states the foot looks a little better today.  This patient has documented high risk feet requiring routine maintenance secondary to diabetes mellitis and those secondary complications of diabetes, as mentioned..        Hemoglobin A1C   Date Value Ref Range Status   2018 10.2 (H) 4.0 - 5.6 % Final     Comment:     ADA Screening Guidelines:  5.7-6.4%  Consistent with prediabetes  >or=6.5%  Consistent with diabetes  High levels of fetal hemoglobin interfere with the HbA1C  assay. Heterozygous hemoglobin variants (HbS, HgC, etc)do  not significantly interfere with this assay.   However, presence of multiple variants may affect accuracy.     2018 9.9 (H) 4.0 - 5.6 % Final     Comment:     ADA Screening Guidelines:  5.7-6.4%  Consistent with  prediabetes  >or=6.5%  Consistent with diabetes  High levels of fetal hemoglobin interfere with the HbA1C  assay. Heterozygous hemoglobin variants (HbS, HgC, etc)do  not significantly interfere with this assay.   However, presence of multiple variants may affect accuracy.     2017 7.1 (H) 4.0 - 5.6 % Final     Comment:     According to ADA guidelines, hemoglobin A1c <7.0% represents  optimal control in non-pregnant diabetic patients. Different  metrics may apply to specific patient populations.   Standards of Medical Care in Diabetes-2016.  For the purpose of screening for the presence of diabetes:  <5.7%     Consistent with the absence of diabetes  5.7-6.4%  Consistent with increasing risk for diabetes   (prediabetes)  >or=6.5%  Consistent with diabetes  Currently, no consensus exists for use of hemoglobin A1c  for diagnosis of diabetes for children.  This Hemoglobin A1c assay has significant interference with fetal   hemoglobin   (HbF). The results are invalid for patients with abnormal amounts of   HbF,   including those with known Hereditary Persistence   of Fetal Hemoglobin. Heterozygous hemoglobin variants (HbAS, HbAC,   HbAD, HbAE, HbA2) do not significantly interfere with this assay;   however, presence of multiple variants in a sample may impact the %   interference.             Patient Active Problem List   Diagnosis    Renal hypertension    GERD (gastroesophageal reflux disease)    Peptic ulcer disease    Malignant HTN with heart disease, w/o CHF, with chronic kidney disease    Acidosis    Essential hypertension    Acute diastolic heart failure    Gastroparesis     donor kidney transplant for DM 16    Prophylactic immunotherapy    Adverse effect of glucocorticoids and synthetic analogues, sequela    Osteoarthritis of lumbar spine    Osteoarthritis of thoracic spine with myelopathy    Type 2 diabetes mellitus with hyperglycemia, with long-term current use of insulin     "Coronary artery disease involving native coronary artery of native heart without angina pectoris    Hyperlipidemia    Chronic obstructive pulmonary disease    Ulnar neuropathy    Chronic kidney disease (CKD), stage III (moderate)    Reactive airway disease    Kidney transplant rejection    Type 2 diabetes mellitus with retinopathy, with long-term current use of insulin    Type 2 diabetes mellitus with diabetic neuropathy, with long-term current use of insulin    H/O amputation    Cavitary lesion of lung    Opacity of lung on imaging study    Bacteremia due to Gram-negative bacteria    ESBL (extended spectrum beta-lactamase) producing bacteria infection    History of hepatitis C, s/p successful Rx w/ SVR12 - 4/2017    Kidney transplant recipient    Intractable vomiting with nausea    PVD (peripheral vascular disease)    Chronic bilateral low back pain with left-sided sciatica    Diabetic polyneuropathy    Other chest pain    Disc disease, degenerative, lumbar or lumbosacral    Numbness and tingling    Adjustment insomnia    Lumbar stenosis with neurogenic claudication    Open wound of left great toe    Gangrene    Acute lumbar radiculopathy    Chronic lumbar radiculopathy    Gangrene of toe of left foot    Long term current use of immunosuppressive drug    Peripheral vascular disease    Diabetic foot infection    Cellulitis       Current Discharge Medication List      CONTINUE these medications which have NOT CHANGED    Details   BD INSULIN PEN NEEDLE UF SHORT 31 gauge x 5/16" Ndle       BD INSULIN SYRINGE ULTRA-FINE 1 mL 31 gauge x 5/16 Syrg USE ONE AS DIRECTED FOUR TIMES DAILY WITH MEALS AND AT BEDTIME  Qty: 120 each, Refills: 10      blood sugar diagnostic Strp 1 each by Misc.(Non-Drug; Combo Route) route 3 (three) times daily.  Qty: 100 each, Refills: 11      blood-glucose meter kit Use as instructed  Qty: 1 each, Refills: 0      ciprofloxacin HCl (CIPRO) 500 MG tablet Take 1 " "tablet (500 mg total) by mouth every 12 (twelve) hours. for 10 days  Qty: 20 tablet, Refills: 1    Associated Diagnoses: Acute osteomyelitis of toe of left foot      ergocalciferol (ERGOCALCIFEROL) 50,000 unit Cap Take 1 capsule (50,000 Units total) by mouth every 7 days. Take on Mondays  Qty: 4 capsule, Refills: 11      HYDROcodone-acetaminophen (NORCO)  mg per tablet Take 1 tablet by mouth every 6 (six) hours as needed.  Qty: 20 tablet, Refills: 0      insulin NPH (NOVOLIN N) 100 unit/mL injection Take 25 units subq with breakfast and 15 units at bedtime  Qty: 4 vial, Refills: 0    Associated Diagnoses: Type 2 diabetes mellitus with hyperglycemia, with long-term current use of insulin      lancets Misc 1 each by Misc.(Non-Drug; Combo Route) route 3 (three) times daily.  Qty: 100 each, Refills: 11      NOVOLIN R REGULAR U-100 INSULN 100 unit/mL injection INJECT 6 UNITS WITH BREAKFAST AND 8 UNITS WITH DINNER, SUBCUTANEOUSLY 30 MIN BEFORE MEAL.  Qty: 10 mL, Refills: 1    Associated Diagnoses: Type 2 diabetes mellitus with hyperglycemia, with long-term current use of insulin      pen needle, diabetic (EASY COMFORT PEN NEEDLES) 32 gauge x 5/32" Ndle Inject 1 each into the skin 3 (three) times daily.  Qty: 100 each, Refills: 11      pen needle, diabetic 31 gauge x 1/4" Ndle 1 each by Misc.(Non-Drug; Combo Route) route 4 (four) times daily.  Qty: 100 each, Refills: 0      predniSONE (DELTASONE) 5 MG tablet Take 1 tablet (5 mg total) by mouth once daily.  Qty: 30 tablet, Refills: 11      sodium bicarbonate 650 MG tablet Take 4 tablets (2,600 mg total) by mouth 2 (two) times daily.  Qty: 240 tablet, Refills: 11      tacrolimus (PROGRAF) 0.5 MG Cap Take 3 capsules (1.5 mg total) by mouth every 12 (twelve) hours. Z94.0 Kidney Transplant  Qty: 180 capsule, Refills: 11    Comments: KTx 5/21/16; Z94.0      zolpidem (AMBIEN) 5 MG Tab Take 2 tablets (10 mg total) by mouth nightly as needed.  Qty: 30 tablet, Refills: 1    "   amLODIPine (NORVASC) 2.5 MG tablet Take 1 tablet (2.5 mg total) by mouth once daily.  Qty: 30 tablet, Refills: 11      aspirin (ECOTRIN) 81 MG EC tablet Take 1 tablet (81 mg total) by mouth once daily.  Refills: 0      budesonide-formoterol 160-4.5 mcg (SYMBICORT) 160-4.5 mcg/actuation HFAA Inhale 2 puffs into the lungs every 12 (twelve) hours. Wash out mouth after using  Qty: 1 Inhaler, Refills: 12    Associated Diagnoses: Asthma with COPD      inhalation device (BREATHERITE VALVED MDI CHAMBER) Use as directed for inhalation.  Qty: 1 Device, Refills: prn    Associated Diagnoses: Asthma with COPD      ondansetron (ZOFRAN-ODT) 4 MG TbDL Take 1 tablet (4 mg total) by mouth every 8 (eight) hours as needed.  Qty: 21 tablet, Refills: 1             Review of patient's allergies indicates:  No Known Allergies    Past Surgical History:   Procedure Laterality Date    AORTOGRAPHY WITH SERIALOGRAPHY N/A 6/14/2018    Procedure: LEFT LEG ANGIOGRAM;  Surgeon: Donal Mcdonald MD;  Location: [facility] CATH LAB;  Service: Vascular;  Laterality: N/A;    av bovine graft      Left UE    AV FISTULA PLACEMENT      left UE    CARDIAC CATHETERIZATION  02/2015    KIDNEY TRANSPLANT  05/21/2016    LEG AMPUTATION THROUGH KNEE  2011    right LE, started as nail puncture leading to diabetic ulcer       Family History   Problem Relation Age of Onset    Cancer Father     Diabetes Father     Heart failure Father     Stroke Father     Heart failure Mother     Kidney disease Neg Hx        Social History     Social History    Marital status: Single     Spouse name: N/A    Number of children: N/A    Years of education: N/A     Occupational History    Disabled      Disabled     Social History Main Topics    Smoking status: Former Smoker     Packs/day: 1.00     Years: 40.00     Quit date: 1/11/2013    Smokeless tobacco: Never Used    Alcohol use 3.6 oz/week     6 Cans of beer per week      Comment: 6 beers/week    Drug use: No    Sexual  activity: No     Other Topics Concern    Not on file     Social History Narrative    Lives alone. Retired from construction and various jobs, now retired. Would like to return to work PT to alleviate boredom.       Vitals:    07/20/18 2354 07/21/18 0347 07/21/18 0840 07/21/18 1129   BP: 120/81 (!) 164/82 115/60 129/79   Pulse: 80 79 79 75   Resp:  19 20 18   Temp:  98.4 °F (36.9 °C) 97.9 °F (36.6 °C) 98 °F (36.7 °C)   TempSrc:   Oral Oral   SpO2:  95% 100% (!) 93%   Weight:       Height:           Hemoglobin A1C   Date Value Ref Range Status   07/07/2018 10.2 (H) 4.0 - 5.6 % Final     Comment:     ADA Screening Guidelines:  5.7-6.4%  Consistent with prediabetes  >or=6.5%  Consistent with diabetes  High levels of fetal hemoglobin interfere with the HbA1C  assay. Heterozygous hemoglobin variants (HbS, HgC, etc)do  not significantly interfere with this assay.   However, presence of multiple variants may affect accuracy.     06/12/2018 9.9 (H) 4.0 - 5.6 % Final     Comment:     ADA Screening Guidelines:  5.7-6.4%  Consistent with prediabetes  >or=6.5%  Consistent with diabetes  High levels of fetal hemoglobin interfere with the HbA1C  assay. Heterozygous hemoglobin variants (HbS, HgC, etc)do  not significantly interfere with this assay.   However, presence of multiple variants may affect accuracy.     11/03/2017 7.1 (H) 4.0 - 5.6 % Final     Comment:     According to ADA guidelines, hemoglobin A1c <7.0% represents  optimal control in non-pregnant diabetic patients. Different  metrics may apply to specific patient populations.   Standards of Medical Care in Diabetes-2016.  For the purpose of screening for the presence of diabetes:  <5.7%     Consistent with the absence of diabetes  5.7-6.4%  Consistent with increasing risk for diabetes   (prediabetes)  >or=6.5%  Consistent with diabetes  Currently, no consensus exists for use of hemoglobin A1c  for diagnosis of diabetes for children.  This Hemoglobin A1c assay has  significant interference with fetal   hemoglobin   (HbF). The results are invalid for patients with abnormal amounts of   HbF,   including those with known Hereditary Persistence   of Fetal Hemoglobin. Heterozygous hemoglobin variants (HbAS, HbAC,   HbAD, HbAE, HbA2) do not significantly interfere with this assay;   however, presence of multiple variants in a sample may impact the %   interference.         Review of Systems   Constitutional: Negative for chills and fever.   Respiratory: Negative for shortness of breath.    Cardiovascular: Negative for chest pain, palpitations, orthopnea, claudication and leg swelling.   Gastrointestinal: Negative for diarrhea, nausea and vomiting.   Musculoskeletal: Negative for joint pain.   Skin: Negative for rash.   Neurological: Positive for tingling and sensory change. Negative for dizziness, focal weakness and weakness.   Psychiatric/Behavioral: Negative.              Objective:      Physical examination: General: Patient is in no acute distress, alert and oriented x 3.  Dressing to left toe clean, dry, and intact.   Lower Extremity Exam:  Vascular: Dorsalis pedis and Posterior tibial pulses faint on left foot.  Capillary fill time blanchabe  to toes on left foot. No edema noted on left foot.   Dermatologic: Left hallux dorsal ulcer noted with fibrotic base. Left distal hallux ulcer noted with fibrotic/eschar base. No exposed bone noted. Decreased  erythema toe left toe 1,3,4,5.   Neurological: Light touch sensation intact to left foot.   Musculoskeletal: Negative pain on palpation/ROM of left foot. Right BKA noted.      TEST RESULTS: Pathology bone culture results reveals acute osteomyelitis and aerobic cultures reveals           Assessment:       Cellulitis  -     EKG 12-lead; Standing  -     X-Ray Foot Complete Left; Standing    Other orders  -     Cancel: ED Preference List Used to Initiate Sepsis Orders; Standing  -     Blood culture x two cultures. Draw prior to  antibiotics.; Standing  -     CBC auto differential; Standing  -     Comprehensive metabolic panel; Standing  -     Lactic acid, plasma #1; Standing  -     Lactic acid, plasma #2; Standing  -     Urinalysis; Standing  -     Saline lock IV; Standing  -     X-Ray Chest AP Portable; Standing  -     Cancel: Procalcitonin; Standing  -     Procalcitonin; Standing  -     Procalcitonin; Standing  -     Urinalysis Microscopic; Standing  -     morphine injection 4 mg; Inject 1 mL (4 mg total) into the vein ED 1 Time.  -     Discontinue: piperacillin-tazobactam 4.5 g in dextrose 5 % 100 mL IVPB (ready to mix system); Inject 100 mLs (4.5 g total) into the vein ED 1 Time.  -     Pharmacy to dose Vancomycin consult; Standing  -     Discontinue: amLODIPine tablet 2.5 mg; Take 1 tablet (2.5 mg total) by mouth once daily.  -     predniSONE tablet 5 mg; Take 1 tablet (5 mg total) by mouth once daily.  -     tacrolimus capsule 1.5 mg; Take 1.5 mg by mouth 0800, 1800.  -     Vital signs; Standing  -     Notify physician ; Standing  -     IP VTE HIGH RISK PATIENT; Standing  -     Pulse Oximetry Q4H; Standing  -     Full code; Standing  -     Cardiac Monitoring - Adult; Standing  -     Ambulate; Standing  -     Diet diabetic [facility]; 2000 Calorie; Standing  -     Place GOPI hose; Standing  -     Place sequential compression device; Standing  -     hydromorphone (PF) injection 1 mg; Inject 0.5 mLs (1 mg total) into the vein every 4 (four) hours as needed for severe pain 7-10/10 pain scale.  -     Admit to Inpatient; Standing  -     glucose chewable tablet 16 g; Take 4 tablets (16 g total) by mouth as needed for Blood Glucose less than (70 mg/dL X 1 dose for patients who can take PO.).  -     glucose chewable tablet 24 g; Take 6 tablets (24 g total) by mouth as needed for Blood Glucose less than (50 mg/dL X 1 dose for patients who can take PO.).  -     dextrose 50% injection 12.5 g; Inject 25 mLs (12.5 g total) into the vein as  needed for For blood glucose (between 51 - 70 mg/dL if patient cannnot take PO but has IV access.).  -     dextrose 50% injection 25 g; Inject 50 mLs (25 g total) into the vein as needed for For blood glucose (less than 50 mg/dL if patient cannnot take PO but has IV access.).  -     glucagon (human recombinant) injection 1 mg; Inject 1 mg into the muscle as needed for Blood Glucose less than (70 mg/dL if patient is unconscious or obtunded and DOES NOT HAVE IV ACCESS.).  -     Recheck Blood Glucose:; Standing  -     POCT glucose; Standing  -     If any glucose result is less than 50 or greater than 400:; Standing  -     If 2nd result is less than 50 or greater than 400:; Standing  -     Do not admin Aspart correction between scheduled prandial Aspart; Standing  -     insulin aspart U-100 pen 1-10 Units; Inject 1-10 Units into the skin before meals and at bedtime as needed for For blood glucose (Hyperglycemia).  -     vancomycin (VANCOCIN) 1,250 mg in dextrose 5 % 250 mL IVPB; Inject 1,250 mg into the vein ED 1 Time.  -     piperacillin-tazobactam 4.5 g in dextrose 5 % 100 mL IVPB (ready to mix system); Inject 100 mLs (4.5 g total) into the vein ED 1 Time.  -     HYDROcodone-acetaminophen  mg per tablet 1 tablet; Take 1 tablet by mouth every 6 (six) hours as needed for moderate pain 4-6/10 pain scale.  -     insulin detemir U-100 pen 15 Units; Inject 15 Units into the skin every evening.  -     Vancomycin, random; Standing  -     vancomycin (VANCOCIN) 1,250 mg in dextrose 5 % 250 mL IVPB; Inject 1,250 mg into the vein every 48 hours.  -     POCT glucose; Standing  -     IP consult to case management; Standing  -     POCT glucose; Standing  -     diphenhydrAMINE 12.5 mg/5 mL elixir 12.5 mg; Take 5 mLs (12.5 mg total) by mouth 3 (three) times daily as needed for Itching.  -     Basic metabolic panel; Standing  -     amLODIPine tablet 5 mg; Take 1 tablet (5 mg total) by mouth once daily.  -     budesonide nebulizer  solution 0.5 mg; Take 2 mLs (0.5 mg total) by nebulization 2 (two) times daily.  -     Inhalation Treatment Daily; Standing  -     Cancel: Inhalation Treatment Q12H; Standing  -     Discontinue: formoterol nebulizer solution 20 mcg; Take 2 mLs (20 mcg total) by nebulization every 12 (twelve) hours.  -     arformoterol nebulizer solution 15 mcg; Take 2 mLs (15 mcg total) by nebulization 2 (two) times daily.  -     Inhalation Treatment BID; Standing  -     POCT glucose; Standing            Plan:   Cellulitis  -     EKG 12-lead; Standing  -     X-Ray Foot Complete Left; Standing    Other orders  -     Cancel: ED Preference List Used to Initiate Sepsis Orders; Standing  -     Blood culture x two cultures. Draw prior to antibiotics.; Standing  -     CBC auto differential; Standing  -     Comprehensive metabolic panel; Standing  -     Lactic acid, plasma #1; Standing  -     Lactic acid, plasma #2; Standing  -     Urinalysis; Standing  -     Saline lock IV; Standing  -     X-Ray Chest AP Portable; Standing  -     Cancel: Procalcitonin; Standing  -     Procalcitonin; Standing  -     Procalcitonin; Standing  -     Urinalysis Microscopic; Standing  -     morphine injection 4 mg; Inject 1 mL (4 mg total) into the vein ED 1 Time.  -     Discontinue: piperacillin-tazobactam 4.5 g in dextrose 5 % 100 mL IVPB (ready to mix system); Inject 100 mLs (4.5 g total) into the vein ED 1 Time.  -     Pharmacy to dose Vancomycin consult; Standing  -     Discontinue: amLODIPine tablet 2.5 mg; Take 1 tablet (2.5 mg total) by mouth once daily.  -     predniSONE tablet 5 mg; Take 1 tablet (5 mg total) by mouth once daily.  -     tacrolimus capsule 1.5 mg; Take 1.5 mg by mouth 0800, 1800.  -     Vital signs; Standing  -     Notify physician ; Standing  -     IP VTE HIGH RISK PATIENT; Standing  -     Pulse Oximetry Q4H; Standing  -     Full code; Standing  -     Cardiac Monitoring - Adult; Standing  -     Ambulate; Standing  -     Diet diabetic  [facility]; 2000 Calorie; Standing  -     Place GOPI hose; Standing  -     Place sequential compression device; Standing  -     hydromorphone (PF) injection 1 mg; Inject 0.5 mLs (1 mg total) into the vein every 4 (four) hours as needed for severe pain 7-10/10 pain scale.  -     Admit to Inpatient; Standing  -     glucose chewable tablet 16 g; Take 4 tablets (16 g total) by mouth as needed for Blood Glucose less than (70 mg/dL X 1 dose for patients who can take PO.).  -     glucose chewable tablet 24 g; Take 6 tablets (24 g total) by mouth as needed for Blood Glucose less than (50 mg/dL X 1 dose for patients who can take PO.).  -     dextrose 50% injection 12.5 g; Inject 25 mLs (12.5 g total) into the vein as needed for For blood glucose (between 51 - 70 mg/dL if patient cannnot take PO but has IV access.).  -     dextrose 50% injection 25 g; Inject 50 mLs (25 g total) into the vein as needed for For blood glucose (less than 50 mg/dL if patient cannnot take PO but has IV access.).  -     glucagon (human recombinant) injection 1 mg; Inject 1 mg into the muscle as needed for Blood Glucose less than (70 mg/dL if patient is unconscious or obtunded and DOES NOT HAVE IV ACCESS.).  -     Recheck Blood Glucose:; Standing  -     POCT glucose; Standing  -     If any glucose result is less than 50 or greater than 400:; Standing  -     If 2nd result is less than 50 or greater than 400:; Standing  -     Do not admin Aspart correction between scheduled prandial Aspart; Standing  -     insulin aspart U-100 pen 1-10 Units; Inject 1-10 Units into the skin before meals and at bedtime as needed for For blood glucose (Hyperglycemia).  -     vancomycin (VANCOCIN) 1,250 mg in dextrose 5 % 250 mL IVPB; Inject 1,250 mg into the vein ED 1 Time.  -     piperacillin-tazobactam 4.5 g in dextrose 5 % 100 mL IVPB (ready to mix system); Inject 100 mLs (4.5 g total) into the vein ED 1 Time.  -     HYDROcodone-acetaminophen  mg per tablet  1 tablet; Take 1 tablet by mouth every 6 (six) hours as needed for moderate pain 4-6/10 pain scale.  -     insulin detemir U-100 pen 15 Units; Inject 15 Units into the skin every evening.  -     Vancomycin, random; Standing  -     vancomycin (VANCOCIN) 1,250 mg in dextrose 5 % 250 mL IVPB; Inject 1,250 mg into the vein every 48 hours.  -     POCT glucose; Standing  -     IP consult to case management; Standing  -     POCT glucose; Standing  -     diphenhydrAMINE 12.5 mg/5 mL elixir 12.5 mg; Take 5 mLs (12.5 mg total) by mouth 3 (three) times daily as needed for Itching.  -     Basic metabolic panel; Standing  -     amLODIPine tablet 5 mg; Take 1 tablet (5 mg total) by mouth once daily.  -     budesonide nebulizer solution 0.5 mg; Take 2 mLs (0.5 mg total) by nebulization 2 (two) times daily.  -     Inhalation Treatment Daily; Standing  -     Cancel: Inhalation Treatment Q12H; Standing  -     Discontinue: formoterol nebulizer solution 20 mcg; Take 2 mLs (20 mcg total) by nebulization every 12 (twelve) hours.  -     arformoterol nebulizer solution 15 mcg; Take 2 mLs (15 mcg total) by nebulization 2 (two) times daily.  -     Inhalation Treatment BID; Standing  -     POCT glucose; Standing      I counseled the patient on his conditions, regarding findings of my examination, my impressions, and usual treatment plan.   Again discussed with patient in detail the treatment options for infected ulcer with bone infection, including (1) local wound care with IV antibiotic for 6-8 weeks, or (2) surgical excision(amputation) of infected bone. Risk for treatments including further limb loss, delayed healing, non-healing was also discussed. Patient states he understands both treatment options and would like to try local wound care with IV antibiotics. Patient states he also understands that he may still need amputation if IV antibiotics and wound care does not work.   Social work orders completed for LTAC placement for IV  antibiotics and local wound care.   Wound dressing changes completed.   Please contact or reconsult podiatry if needed.             Katerina Magaña DPM  [facility]

## 2018-07-21 NOTE — CONSULTS
Lavelle Ladd [id number] is a 62 y.o. male who has been consulted for vancomycin dosing.    Dx: SSTI of left foot  Goal trough: 10-15    The patient has the following labs:     Date Creatinine (mg/dl)    BUN WBC Count   7/20/2018 Scr 1.8 Lab Results   Component Value Date    BUN 19 07/20/2018     Lab Results   Component Value Date    WBC 8.85 07/20/2018        Current weight is 101 kg (222 lb 10.6 oz)    The patient will be started on vancomycin at a dose of 1,250 mg every ED 1 time. Patient will be PULSE DOSED. Placeholder dose of 1,250 mg to be given once Vancomycin level <13. Patient will be followed by pharmacy for changes in renal function, toxicity, and efficacy.  The vancomycin RANDOM has been ordered for 7/21 at 1200.      Thank you for allowing us to participate in this patient's care.     Mary Jane Aguiar

## 2018-07-21 NOTE — SUBJECTIVE & OBJECTIVE
Past Medical History:   Diagnosis Date    DINORAH (acute kidney injury) 2016    Arthritis     Chronic obstructive pulmonary disease 2016    Coronary artery disease involving native coronary artery of native heart without angina pectoris 2016     donor kidney transplant for DM 16     Induction with Thymo x3 and IV solumedrol to total 875mg  Kidney Biopsy  2016: 16 glomeruli, ACR type 1 AVR type 2, significant microcirculatory changes, c4d negative, No DSA, 5 to10% fibrosis. Treated with thymo x8 2016- no rejection      Diastolic heart failure     Encounter for blood transfusion     ESRD on RRT since 10/2013 10/29/2013    Biopsy proven diabetic nephropathy and lymphoplasmacytic interstitial infiltrate not c/w with AIN (ddx sjogrens or assoc with tamm-horsefall protein extravasation)     GERD (gastroesophageal reflux disease)     History of hepatitis C, s/p successful Rx w/ SVR12 - 2017    Completed 12 weeks harvoni w/ SVR    Hyperlipidemia     Hypertension     Prophylactic immunotherapy     Proteinuria     PVD (peripheral vascular disease) 2017    RLE BKA CT 16 Extensive atherosclerotic disease of the aorta and mesenteric arteries.     Renal hypertension     Type 2 diabetes mellitus with diabetic neuropathy, with long-term current use of insulin 2016    Vitamin B12 deficiency        Past Surgical History:   Procedure Laterality Date    AORTOGRAPHY WITH SERIALOGRAPHY N/A 2018    Procedure: LEFT LEG ANGIOGRAM;  Surgeon: Donal Mcdonald MD;  Location: [facility] CATH LAB;  Service: Vascular;  Laterality: N/A;    av bovine graft      Left UE    AV FISTULA PLACEMENT      left UE    CARDIAC CATHETERIZATION  2015    KIDNEY TRANSPLANT  2016    LEG AMPUTATION THROUGH KNEE      right LE, started as nail puncture leading to diabetic ulcer       Review of patient's allergies indicates:  No Known Allergies    No current  "facility-administered medications on file prior to encounter.      Current Outpatient Prescriptions on File Prior to Encounter   Medication Sig    BD INSULIN PEN NEEDLE UF SHORT 31 gauge x 5/16" Ndle     BD INSULIN SYRINGE ULTRA-FINE 1 mL 31 gauge x 5/16 Syrg USE ONE AS DIRECTED FOUR TIMES DAILY WITH MEALS AND AT BEDTIME    blood sugar diagnostic Strp 1 each by Misc.(Non-Drug; Combo Route) route 3 (three) times daily.    blood-glucose meter kit Use as instructed    ciprofloxacin HCl (CIPRO) 500 MG tablet Take 1 tablet (500 mg total) by mouth every 12 (twelve) hours. for 10 days    ergocalciferol (ERGOCALCIFEROL) 50,000 unit Cap Take 1 capsule (50,000 Units total) by mouth every 7 days. Take on Mondays    HYDROcodone-acetaminophen (NORCO)  mg per tablet Take 1 tablet by mouth every 6 (six) hours as needed.    insulin NPH (NOVOLIN N) 100 unit/mL injection Take 25 units subq with breakfast and 15 units at bedtime    lancets Misc 1 each by Misc.(Non-Drug; Combo Route) route 3 (three) times daily.    NOVOLIN R REGULAR U-100 INSULN 100 unit/mL injection INJECT 6 UNITS WITH BREAKFAST AND 8 UNITS WITH DINNER, SUBCUTANEOUSLY 30 MIN BEFORE MEAL.    pen needle, diabetic (EASY COMFORT PEN NEEDLES) 32 gauge x 5/32" Ndle Inject 1 each into the skin 3 (three) times daily.    pen needle, diabetic 31 gauge x 1/4" Ndle 1 each by Misc.(Non-Drug; Combo Route) route 4 (four) times daily.    predniSONE (DELTASONE) 5 MG tablet Take 1 tablet (5 mg total) by mouth once daily.    sodium bicarbonate 650 MG tablet Take 4 tablets (2,600 mg total) by mouth 2 (two) times daily.    tacrolimus (PROGRAF) 0.5 MG Cap Take 3 capsules (1.5 mg total) by mouth every 12 (twelve) hours. Z94.0 Kidney Transplant    zolpidem (AMBIEN) 5 MG Tab Take 2 tablets (10 mg total) by mouth nightly as needed.    amLODIPine (NORVASC) 2.5 MG tablet Take 1 tablet (2.5 mg total) by mouth once daily.    aspirin (ECOTRIN) 81 MG EC tablet Take 1 tablet " (81 mg total) by mouth once daily.    budesonide-formoterol 160-4.5 mcg (SYMBICORT) 160-4.5 mcg/actuation HFAA Inhale 2 puffs into the lungs every 12 (twelve) hours. Wash out mouth after using    inhalation device (BREATHERITE VALVED MDI CHAMBER) Use as directed for inhalation.    ondansetron (ZOFRAN-ODT) 4 MG TbDL Take 1 tablet (4 mg total) by mouth every 8 (eight) hours as needed.     Family History     Problem Relation (Age of Onset)    Cancer Father    Diabetes Father    Heart failure Father, Mother    Stroke Father        Social History Main Topics    Smoking status: Former Smoker     Packs/day: 1.00     Years: 40.00     Quit date: 1/11/2013    Smokeless tobacco: Never Used    Alcohol use 3.6 oz/week     6 Cans of beer per week      Comment: 6 beers/week    Drug use: No    Sexual activity: No     Review of Systems   Constitutional: Negative for activity change, appetite change, chills, fatigue and fever.   HENT: Negative.  Negative for congestion.    Eyes: Negative for pain, redness and visual disturbance.   Respiratory: Negative for cough, shortness of breath and wheezing.    Cardiovascular: Positive for leg swelling. Negative for chest pain and palpitations.   Gastrointestinal: Negative for abdominal pain, constipation, diarrhea, nausea and vomiting.   Genitourinary: Negative.    Musculoskeletal: Positive for back pain and gait problem.   Skin: Positive for color change and wound.   Neurological: Positive for numbness. Negative for dizziness and light-headedness.        + numbness to bilateral feet   Psychiatric/Behavioral: Negative for agitation and confusion. The patient is not nervous/anxious.      Objective:     Vital Signs (Most Recent):  Temp: 98.3 °F (36.8 °C) (07/20/18 1800)  Pulse: 83 (07/20/18 1800)  Resp: 18 (07/20/18 1800)  BP: (!) 185/96 (07/20/18 1800)  SpO2: 95 % (07/20/18 1800) Vital Signs (24h Range):  Temp:  [98 °F (36.7 °C)-98.5 °F (36.9 °C)] 98.3 °F (36.8 °C)  Pulse:  [75-98]  83  Resp:  [16-20] 18  SpO2:  [95 %-97 %] 95 %  BP: (118-185)/() 185/96     Weight: 101 kg (222 lb 10.6 oz) (with prosthesis)  Body mass index is 31.06 kg/m².    Physical Exam   Constitutional: He is oriented to person, place, and time. He appears well-developed and well-nourished. No distress.   HENT:   Head: Normocephalic and atraumatic.   Eyes: Conjunctivae and EOM are normal. Right eye exhibits no discharge.   Neck: Normal range of motion. Neck supple. No tracheal deviation present. No thyromegaly present.   Cardiovascular: Normal rate, regular rhythm and normal heart sounds.  Exam reveals no gallop and no friction rub.    No murmur heard.  Pulmonary/Chest: Effort normal and breath sounds normal. He has no wheezes. He has no rales.   Abdominal: Soft. Bowel sounds are normal. He exhibits no mass. There is no tenderness. There is no rebound and no guarding.   Musculoskeletal: He exhibits edema.   Left toe gangrene , s/p right BKA, mild edema to left foot   Lymphadenopathy:     He has no cervical adenopathy.   Neurological: He is alert and oriented to person, place, and time.   Skin: Skin is warm. No rash noted. He is not diaphoretic. There is erythema.   Surgical DSG to Left foot CDI   Psychiatric: He has a normal mood and affect. His behavior is normal.   Nursing note and vitals reviewed.        CRANIAL NERVES     CN III, IV, VI   Extraocular motions are normal.        Significant Labs:   Recent Lab Results       07/20/18  1744 07/20/18  1433 07/20/18  1359 07/20/18  1345      Procalcitonin   0.11  Comment:  Please re-baseline procalcitonin if a patient is transferred from  other facilities to [facility].  A concentration < 0.25 ng/mL represents a low risk bacterial   infection.  Procalcitonin may not be accurate among patients with localized   infection, recent trauma or major surgery, immunosuppressed state,   invasive fungal infection, renal dysfunction. Decisions regarding   initiation or  continuation of antibiotic therapy should not be based   solely on procalcitonin levels.        Albumin    3.6     Alkaline Phosphatase    92     ALT    16     Anion Gap    12     Appearance, UA  Clear       AST    12     Bacteria, UA  Rare       Baso #    0.01     Basophil%    0.1     Bilirubin (UA)  Negative       Total Bilirubin    0.6  Comment:  For infants and newborns, interpretation of results should be based  on gestational age, weight and in agreement with clinical  observations.  Premature Infant recommended reference ranges:  Up to 24 hours.............<8.0 mg/dL  Up to 48 hours............<12.0 mg/dL  3-5 days..................<15.0 mg/dL  6-29 days.................<15.0 mg/dL       BUN, Bld    19     Calcium    9.5     Chloride    100     CO2    23     Color, UA  Yellow       Creatinine    1.8(H)     Differential Method    Automated     eGFR if     45(A)     eGFR if non     39  Comment:  Calculation used to obtain the estimated glomerular filtration  rate (eGFR) is the CKD-EPI equation.   (A)     Eos #    0.1     Eosinophil%    0.6     Glucose    280(H)     Glucose, UA  3+(A)       Gran # (ANC)    6.2     Gran%    70.3     Hematocrit    44.1     Hemoglobin    14.8     Hyaline Casts, UA  1       Ketones, UA  Negative       Lactate, Massimo 1.2  Comment:  Falsely low lactic acid results can be found in samples   containing >=13.0 mg/dL total bilirubin and/or >=3.5 mg/dL   direct bilirubin.     1.3  Comment:  Falsely low lactic acid results can be found in samples   containing >=13.0 mg/dL total bilirubin and/or >=3.5 mg/dL   direct bilirubin.       Leukocytes, UA  Negative       Lymph #    1.6     Lymph%    18.4     MCH    31.0     MCHC    33.6     MCV    92     Microscopic Comment  SEE COMMENT  Comment:  Other formed elements not mentioned in the report are not   present in the microscopic examination.          Mono #    0.9     Mono%    10.6     MPV    10.1     Nitrite, UA   Negative       Occult Blood UA  Trace(A)       pH, UA  7.0       Platelets    161     Potassium    4.2     Total Protein    7.4     Protein, UA  1+  Comment:  Recommend a 24 hour urine protein or a urine   protein/creatinine ratio if globulin induced proteinuria is  clinically suspected.  (A)       RBC    4.78     RBC, UA  1       RDW    12.7     Sodium    135(L)     Specific [place], UA  1.015       Specimen UA  Urine, Clean Catch       Squam Epithel, UA  1       Urobilinogen, UA  Negative       WBC, UA  4       WBC    8.85     Yeast, UA  Few(A)             Significant Imaging: I have reviewed all pertinent imaging results/findings within the past 24 hours.

## 2018-07-21 NOTE — PROGRESS NOTES
Clinical Pharmacy Review    Pharmacy Vancomycin consult day #2   PULSE DOSE  Scr 1.7, donor kidney   Vancomycin random 7/21 at 1105 = 8.6 (approximately 17 hour charissa)   Give 1,250 mg dose 7/21 at 1330  AM random level due 7/22 at 0430  Placeholder dose of 1,250 mg every 48 hours to be given once Vancomycin level <18  Dx: Cellulitis WITH Osteomyelitis, therefore  Goal trough: 15-20   Patient will need several weeks of IV antibiotics for osteo   Patient is on no other antibiotics at this time   WBC/Temp WNL  Blood cultures x 2 (7/20) NGTD    Pharmacy will continue to closely monitor patient and make adjustments as necessary.   Thank you for allowing us to participate in this patient's care,   Mary Jane Aguiar 7/21/2018 1:04 PM

## 2018-07-22 LAB
CRP SERPL-MCNC: 34.18 MG/L
ERYTHROCYTE [SEDIMENTATION RATE] IN BLOOD BY WESTERGREN METHOD: 31 MM/HR
POCT GLUCOSE: 189 MG/DL (ref 70–110)
POCT GLUCOSE: 229 MG/DL (ref 70–110)
POCT GLUCOSE: 238 MG/DL (ref 70–110)
POCT GLUCOSE: 369 MG/DL (ref 70–110)
VANCOMYCIN SERPL-MCNC: 13.5 UG/ML

## 2018-07-22 PROCEDURE — 11000001 HC ACUTE MED/SURG PRIVATE ROOM

## 2018-07-22 PROCEDURE — 63600175 PHARM REV CODE 636 W HCPCS: Performed by: EMERGENCY MEDICINE

## 2018-07-22 PROCEDURE — 36415 COLL VENOUS BLD VENIPUNCTURE: CPT

## 2018-07-22 PROCEDURE — 21400001 HC TELEMETRY ROOM

## 2018-07-22 PROCEDURE — 25000242 PHARM REV CODE 250 ALT 637 W/ HCPCS: Performed by: INTERNAL MEDICINE

## 2018-07-22 PROCEDURE — 94640 AIRWAY INHALATION TREATMENT: CPT

## 2018-07-22 PROCEDURE — 85651 RBC SED RATE NONAUTOMATED: CPT

## 2018-07-22 PROCEDURE — 86141 C-REACTIVE PROTEIN HS: CPT

## 2018-07-22 PROCEDURE — 80202 ASSAY OF VANCOMYCIN: CPT

## 2018-07-22 PROCEDURE — 63600175 PHARM REV CODE 636 W HCPCS: Performed by: INTERNAL MEDICINE

## 2018-07-22 PROCEDURE — 25000003 PHARM REV CODE 250: Performed by: INTERNAL MEDICINE

## 2018-07-22 PROCEDURE — 25000003 PHARM REV CODE 250: Performed by: EMERGENCY MEDICINE

## 2018-07-22 PROCEDURE — 96372 THER/PROPH/DIAG INJ SC/IM: CPT

## 2018-07-22 PROCEDURE — 63600175 PHARM REV CODE 636 W HCPCS: Performed by: HOSPITALIST

## 2018-07-22 RX ADMIN — HYDROCODONE BITARTRATE AND ACETAMINOPHEN 1 TABLET: 10; 325 TABLET ORAL at 06:07

## 2018-07-22 RX ADMIN — HYDROCODONE BITARTRATE AND ACETAMINOPHEN 1 TABLET: 10; 325 TABLET ORAL at 12:07

## 2018-07-22 RX ADMIN — INSULIN ASPART 10 UNITS: 100 INJECTION, SOLUTION INTRAVENOUS; SUBCUTANEOUS at 04:07

## 2018-07-22 RX ADMIN — COLLAGENASE SANTYL: 250 OINTMENT TOPICAL at 02:07

## 2018-07-22 RX ADMIN — INSULIN ASPART 2 UNITS: 100 INJECTION, SOLUTION INTRAVENOUS; SUBCUTANEOUS at 09:07

## 2018-07-22 RX ADMIN — INSULIN DETEMIR 15 UNITS: 100 INJECTION, SOLUTION SUBCUTANEOUS at 08:07

## 2018-07-22 RX ADMIN — TACROLIMUS 1.5 MG: 1 CAPSULE ORAL at 06:07

## 2018-07-22 RX ADMIN — TACROLIMUS 1.5 MG: 1 CAPSULE ORAL at 08:07

## 2018-07-22 RX ADMIN — VANCOMYCIN HYDROCHLORIDE 1250 MG: 1 INJECTION, POWDER, LYOPHILIZED, FOR SOLUTION INTRAVENOUS at 02:07

## 2018-07-22 RX ADMIN — AMLODIPINE BESYLATE 5 MG: 5 TABLET ORAL at 08:07

## 2018-07-22 RX ADMIN — INSULIN ASPART 2 UNITS: 100 INJECTION, SOLUTION INTRAVENOUS; SUBCUTANEOUS at 06:07

## 2018-07-22 RX ADMIN — MORPHINE SULFATE 4 MG: 4 INJECTION, SOLUTION INTRAMUSCULAR; INTRAVENOUS at 02:07

## 2018-07-22 RX ADMIN — ARFORMOTEROL TARTRATE 15 MCG: 15 SOLUTION RESPIRATORY (INHALATION) at 08:07

## 2018-07-22 RX ADMIN — BUDESONIDE 0.5 MG: 0.5 SUSPENSION RESPIRATORY (INHALATION) at 08:07

## 2018-07-22 RX ADMIN — PREDNISONE 5 MG: 5 TABLET ORAL at 08:07

## 2018-07-22 RX ADMIN — INSULIN ASPART 6 UNITS: 100 INJECTION, SOLUTION INTRAVENOUS; SUBCUTANEOUS at 11:07

## 2018-07-22 NOTE — PLAN OF CARE
Problem: Patient Care Overview  Goal: Plan of Care Review  Outcome: Ongoing (interventions implemented as appropriate)  Remained free from injury. Ambulating with stand by assist. Prosthesis at bedside. Continuing iv abx. Tolerating diet. Supplemental insulin given per sliding scale. Pain controlled with po medication. NSR on monitor. 12 hour chart check complete.

## 2018-07-22 NOTE — PLAN OF CARE
"Problem: Patient Care Overview  Goal: Plan of Care Review  Outcome: Ongoing (interventions implemented as appropriate)  Patient had no adverse events during shift. BG was elevated and insulin coverage was needed. Dressing change performed per order. Pt refuses SCD's. IV SL. Pain adequately controlled with IV PRN meds but pt requested Morphine instead of Dilaudid due to feelings of nausea and "flushed feeling". NSR on cardiac monitor in the 60s-70s. Elevated BP controlled with PRN Hydralazine. Accuchecks have been >200 required additional insulin. Chart reviewed will continue to monitor.       "

## 2018-07-22 NOTE — ASSESSMENT & PLAN NOTE
Cellulitis and gangrene of the right foot.  Cont broad spectrum antibiotics.  Podiatrist and Infectious Disease consulted.

## 2018-07-22 NOTE — PROGRESS NOTES
Clinical Pharmacy:Vancomycin Progress Note    Day #3 of Vancomycin    Vancomycin random level = 13.5 mcg/ml    WBC and Temp are WNL  SCr = 1.7 (trending down x 2 days from 1.9)    Blood Cx x 2 sets (07/20) -- NGTD x 2 days  Dx: Osteomyelitis/Cellulitis    Goal trough: 15-20 mcg/ml    Will order a 1250mg dose to be given at 1400. Patient will continue to be pulse dosed at this time. A random level will be ordered with AM labs. Patient may receive another dose once level is <18. If patient's SCr remains stable, patient's dosing may be able to be scheduled.   Random level due: 07/23 at 0430    Thank you for allowing us to participate in this patient's care.   Latoya Florez, PharmD 7/22/2018 3:10 PM

## 2018-07-22 NOTE — PROGRESS NOTES
[facility] Hospital Medicine  Progress Note    Patient Name: Lavelle Ladd  MRN: [id number]  Patient Class: IP- Inpatient   Admission Date: 7/20/2018  Length of Stay: 2 days  Attending Physician: Shelton Edgar MD  Primary Care Provider: Rishabh Esteban MD        Subjective:     Principal Problem:Cellulitis    HPI:   62 year old male with type II diabetes mellitus, diabetic neuropathy, chronic back pain, right above the knee amputation and history of kidney transplant on 5/21/16, on Prograf and prednisone presented to the er  Due to left foot infection and cellulitis. Pt was referred to the ED by podiatry for IV abx. Symptoms are constant and moderate in severity. No mitigating or exacerbating factors reported. Associated sxs include L foot pain, L foot swelling. Patient denies any falls, trauma, fever, chills, worsening extremity weakness/numbness, and all other sxs at this time. Pt had a debridement of his L great toe on 7/9. No further complaints or concerns at this time.   He was  Recently d/c  from [facility] : Per D/C summary :On 7/6 patient was admitted to Medicine secondary to Gangrene of Left great toe.  Xray of left foot showed no acute findings.  No change in the incompletely healed fracture of the base of the proximal phalanx of the 2nd toe.  Arterial US showed diffuse atherosclerotic change of the left lower extremity arteries.  No findings suspicious for focal stenosis or occlusion.  He has a h/o PVD s/p Angiogram with balloon angioplasty and atherectomy to LLE per Dr. Mcdonald in June.  Podiatry consulted in the ER and per Dr. Magaña he will likely need amputation.  Vascular consult pending.  Started on Vanc and Zosyn.  Nephrology consulted given h/o kidney transplant.  BC continue to show NGTD.  As of 7/8 patient has been evaluated by vascular surgeon Dr. Uribe.  Per vascular, left foot arterial blood flow is adequate.  Ok for podiatry to proceed with OR for aggressive local  debridement/amputation.  Reconsult if any further assistance is required.  Will d/w Dr. Magaña to determine POC moving forward.  Continue local care per podiatry recs for now.  As of 7/9 patient is s/p I and D per Dr. Magaña today.  Wound culture pending.  As of 7/10 patient evaluated by Dr. Guthrie with podiatry and cleared for DC from their standpoint.  Per podiatry patient will continue local wound care daily and was instructed to f/u with Dr. Magaña later this week for a wound re-check and verbalized + understanding.  Per podiatry, patient can complete 7 days of oral Zyvox to cover for MRSA.  BC and wound culture continues to show NGTD.  Case d/w Dr. Mueller.  Patient seen and examined and deemed medically stable to DC home today.  He was offered home health to continue wound care, but declined.  He will be instructed on daily wound care per nursing prior to DC.  Medications reconicled for DC home.  Also instructed to f/u with his PCP within 3 days and with Nephrology within 2-3 weeks and verbalized + understanding.            Hospital Course:  Admitted with a dx of left great toe cellulitis and gangrene.  He was started on IVAB.  Podiatrist was consulted from the ER.  Continuing wound care and Vancomycin.  Infectious Disease evaluation.    Interval History:  Seen and examined at bedside . He denies any complaint for today .     Review of Systems   Constitutional: Negative for activity change, appetite change, chills, fatigue and fever.   HENT: Negative.  Negative for congestion.    Eyes: Negative for pain, redness and visual disturbance.   Respiratory: Negative for cough, shortness of breath and wheezing.    Cardiovascular: Positive for leg swelling. Negative for chest pain and palpitations.   Gastrointestinal: Negative for abdominal pain, constipation, diarrhea, nausea and vomiting.   Genitourinary: Negative.    Musculoskeletal: Positive for back pain and gait problem.   Skin: Positive for color change and wound.    Neurological: Positive for numbness. Negative for dizziness and light-headedness.        + numbness to bilateral feet   Psychiatric/Behavioral: Negative for agitation and confusion. The patient is not nervous/anxious.      Objective:     Vital Signs (Most Recent):  Temp: 98 °F (36.7 °C) (07/22/18 1210)  Pulse: 70 (07/22/18 1210)  Resp: 17 (07/22/18 1210)  BP: 139/86 (07/22/18 1210)  SpO2: (!) 94 % (07/22/18 1210) Vital Signs (24h Range):  Temp:  [97.9 °F (36.6 °C)-98.4 °F (36.9 °C)] 98 °F (36.7 °C)  Pulse:  [62-91] 70  Resp:  [16-20] 17  SpO2:  [94 %-97 %] 94 %  BP: (120-205)/(63-99) 139/86     Weight: 101 kg (222 lb 10.6 oz) (with prosthesis)  Body mass index is 31.06 kg/m².    Intake/Output Summary (Last 24 hours) at 07/22/18 1524  Last data filed at 07/22/18 1247   Gross per 24 hour   Intake              850 ml   Output              200 ml   Net              650 ml      Physical Exam   Constitutional: He is oriented to person, place, and time. He appears well-developed and well-nourished. No distress.   HENT:   Head: Normocephalic and atraumatic.   Eyes: Conjunctivae and EOM are normal. Right eye exhibits no discharge.   Neck: Normal range of motion. Neck supple. No tracheal deviation present. No thyromegaly present.   Cardiovascular: Normal rate, regular rhythm and normal heart sounds.  Exam reveals no gallop and no friction rub.    No murmur heard.  Pulmonary/Chest: Effort normal and breath sounds normal. He has no wheezes. He has no rales.   Abdominal: Soft. Bowel sounds are normal. He exhibits no mass. There is no tenderness. There is no rebound and no guarding.   Musculoskeletal: He exhibits edema.   Left toe gangrene , s/p right BKA, mild edema to left foot   Lymphadenopathy:     He has no cervical adenopathy.   Neurological: He is alert and oriented to person, place, and time.   Skin: Skin is warm. No rash noted. He is not diaphoretic. There is erythema.   Surgical DSG to Left foot CDI   Psychiatric: He  has a normal mood and affect. His behavior is normal.   Nursing note and vitals reviewed.      Significant Labs:   Recent Lab Results       18  1145 18  0614 18  0421 18  2121 18  1704      POCT Glucose 238(H) 189(H)  229(H) 251(H)     Vancomycin, Random   13.5                       Significant Imaging: I have reviewed all pertinent imaging results/findings within the past 24 hours.    Assessment/Plan:      * Cellulitis    Cellulitis and gangrene of the right foot.  Cont broad spectrum antibiotics.  Podiatrist and Infectious Disease consulted.        PVD (peripheral vascular disease)    Cont asa         H/O amputation      Cont current tx         Chronic kidney disease (CKD), stage III (moderate)      Monitor kidney function           Chronic obstructive pulmonary disease      Resume laba/ihc         Hyperlipidemia    Cont statin           Type 2 diabetes mellitus with hyperglycemia, with long-term current use of insulin      Start levemir and iss        Prophylactic immunotherapy               donor kidney transplant for DM 16    Cont prograf          VTE Risk Mitigation         Ordered     IP VTE HIGH RISK PATIENT  Once      18     Place GOPI hose  Until discontinued      18     Place sequential compression device  Until discontinued      18              Shelton Edgar MD  Department of Hospital Medicine   [facility]

## 2018-07-22 NOTE — SUBJECTIVE & OBJECTIVE
Interval History:  Seen and examined at bedside . He denies any complaint for today .     Review of Systems   Constitutional: Negative for activity change, appetite change, chills, fatigue and fever.   HENT: Negative.  Negative for congestion.    Eyes: Negative for pain, redness and visual disturbance.   Respiratory: Negative for cough, shortness of breath and wheezing.    Cardiovascular: Positive for leg swelling. Negative for chest pain and palpitations.   Gastrointestinal: Negative for abdominal pain, constipation, diarrhea, nausea and vomiting.   Genitourinary: Negative.    Musculoskeletal: Positive for back pain and gait problem.   Skin: Positive for color change and wound.   Neurological: Positive for numbness. Negative for dizziness and light-headedness.        + numbness to bilateral feet   Psychiatric/Behavioral: Negative for agitation and confusion. The patient is not nervous/anxious.      Objective:     Vital Signs (Most Recent):  Temp: 98 °F (36.7 °C) (07/22/18 1210)  Pulse: 70 (07/22/18 1210)  Resp: 17 (07/22/18 1210)  BP: 139/86 (07/22/18 1210)  SpO2: (!) 94 % (07/22/18 1210) Vital Signs (24h Range):  Temp:  [97.9 °F (36.6 °C)-98.4 °F (36.9 °C)] 98 °F (36.7 °C)  Pulse:  [62-91] 70  Resp:  [16-20] 17  SpO2:  [94 %-97 %] 94 %  BP: (120-205)/(63-99) 139/86     Weight: 101 kg (222 lb 10.6 oz) (with prosthesis)  Body mass index is 31.06 kg/m².    Intake/Output Summary (Last 24 hours) at 07/22/18 1524  Last data filed at 07/22/18 1247   Gross per 24 hour   Intake              850 ml   Output              200 ml   Net              650 ml      Physical Exam   Constitutional: He is oriented to person, place, and time. He appears well-developed and well-nourished. No distress.   HENT:   Head: Normocephalic and atraumatic.   Eyes: Conjunctivae and EOM are normal. Right eye exhibits no discharge.   Neck: Normal range of motion. Neck supple. No tracheal deviation present. No thyromegaly present.   Cardiovascular:  Normal rate, regular rhythm and normal heart sounds.  Exam reveals no gallop and no friction rub.    No murmur heard.  Pulmonary/Chest: Effort normal and breath sounds normal. He has no wheezes. He has no rales.   Abdominal: Soft. Bowel sounds are normal. He exhibits no mass. There is no tenderness. There is no rebound and no guarding.   Musculoskeletal: He exhibits edema.   Left toe gangrene , s/p right BKA, mild edema to left foot   Lymphadenopathy:     He has no cervical adenopathy.   Neurological: He is alert and oriented to person, place, and time.   Skin: Skin is warm. No rash noted. He is not diaphoretic. There is erythema.   Surgical DSG to Left foot CDI   Psychiatric: He has a normal mood and affect. His behavior is normal.   Nursing note and vitals reviewed.      Significant Labs:   Recent Lab Results       07/22/18  1145 07/22/18  0614 07/22/18  0421 07/21/18  2121 07/21/18  1704      POCT Glucose 238(H) 189(H)  229(H) 251(H)     Vancomycin, Random   13.5                       Significant Imaging: I have reviewed all pertinent imaging results/findings within the past 24 hours.

## 2018-07-23 VITALS
TEMPERATURE: 98 F | BODY MASS INDEX: 31.18 KG/M2 | HEIGHT: 71 IN | OXYGEN SATURATION: 95 % | SYSTOLIC BLOOD PRESSURE: 168 MMHG | DIASTOLIC BLOOD PRESSURE: 73 MMHG | WEIGHT: 222.69 LBS | RESPIRATION RATE: 18 BRPM | HEART RATE: 93 BPM

## 2018-07-23 DIAGNOSIS — J44.89 ASTHMA WITH COPD: Primary | ICD-10-CM

## 2018-07-23 LAB
ALBUMIN SERPL BCP-MCNC: 3.1 G/DL
ANION GAP SERPL CALC-SCNC: 10 MMOL/L
BASOPHILS # BLD AUTO: 0.03 K/UL
BASOPHILS NFR BLD: 0.5 %
BUN SERPL-MCNC: 23 MG/DL
CALCIUM SERPL-MCNC: 9.6 MG/DL
CHLORIDE SERPL-SCNC: 106 MMOL/L
CO2 SERPL-SCNC: 20 MMOL/L
CREAT SERPL-MCNC: 1.7 MG/DL
DIFFERENTIAL METHOD: NORMAL
EOSINOPHIL # BLD AUTO: 0.1 K/UL
EOSINOPHIL NFR BLD: 1.5 %
ERYTHROCYTE [DISTWIDTH] IN BLOOD BY AUTOMATED COUNT: 13 %
EST. GFR  (AFRICAN AMERICAN): 48 ML/MIN/1.73 M^2
EST. GFR  (NON AFRICAN AMERICAN): 42 ML/MIN/1.73 M^2
GLUCOSE SERPL-MCNC: 183 MG/DL
HCT VFR BLD AUTO: 43.2 %
HGB BLD-MCNC: 14.5 G/DL
LYMPHOCYTES # BLD AUTO: 1.3 K/UL
LYMPHOCYTES NFR BLD: 21.5 %
MCH RBC QN AUTO: 30.9 PG
MCHC RBC AUTO-ENTMCNC: 33.6 G/DL
MCV RBC AUTO: 92 FL
MONOCYTES # BLD AUTO: 0.8 K/UL
MONOCYTES NFR BLD: 12.8 %
NEUTROPHILS # BLD AUTO: 3.8 K/UL
NEUTROPHILS NFR BLD: 63.7 %
PHOSPHATE SERPL-MCNC: 2.8 MG/DL
PLATELET # BLD AUTO: 183 K/UL
PMV BLD AUTO: 10.2 FL
POCT GLUCOSE: 172 MG/DL (ref 70–110)
POCT GLUCOSE: 344 MG/DL (ref 70–110)
POTASSIUM SERPL-SCNC: 4.1 MMOL/L
RBC # BLD AUTO: 4.69 M/UL
SODIUM SERPL-SCNC: 136 MMOL/L
VANCOMYCIN SERPL-MCNC: 16.2 UG/ML
WBC # BLD AUTO: 5.96 K/UL

## 2018-07-23 PROCEDURE — 25000003 PHARM REV CODE 250: Performed by: INTERNAL MEDICINE

## 2018-07-23 PROCEDURE — 25000242 PHARM REV CODE 250 ALT 637 W/ HCPCS: Performed by: INTERNAL MEDICINE

## 2018-07-23 PROCEDURE — 25000003 PHARM REV CODE 250: Performed by: NURSE PRACTITIONER

## 2018-07-23 PROCEDURE — 63600175 PHARM REV CODE 636 W HCPCS: Performed by: INTERNAL MEDICINE

## 2018-07-23 PROCEDURE — 36415 COLL VENOUS BLD VENIPUNCTURE: CPT

## 2018-07-23 PROCEDURE — 99900035 HC TECH TIME PER 15 MIN (STAT)

## 2018-07-23 PROCEDURE — 63600175 PHARM REV CODE 636 W HCPCS: Performed by: EMERGENCY MEDICINE

## 2018-07-23 PROCEDURE — 80202 ASSAY OF VANCOMYCIN: CPT

## 2018-07-23 PROCEDURE — 94640 AIRWAY INHALATION TREATMENT: CPT

## 2018-07-23 PROCEDURE — 02HV33Z INSERTION OF INFUSION DEVICE INTO SUPERIOR VENA CAVA, PERCUTANEOUS APPROACH: ICD-10-PCS

## 2018-07-23 PROCEDURE — 96372 THER/PROPH/DIAG INJ SC/IM: CPT

## 2018-07-23 PROCEDURE — 94760 N-INVAS EAR/PLS OXIMETRY 1: CPT

## 2018-07-23 PROCEDURE — 63600175 PHARM REV CODE 636 W HCPCS: Performed by: NURSE PRACTITIONER

## 2018-07-23 PROCEDURE — 80069 RENAL FUNCTION PANEL: CPT

## 2018-07-23 PROCEDURE — 85025 COMPLETE CBC W/AUTO DIFF WBC: CPT

## 2018-07-23 PROCEDURE — 25000003 PHARM REV CODE 250: Performed by: EMERGENCY MEDICINE

## 2018-07-23 RX ORDER — CEFEPIME HYDROCHLORIDE 2 G/50ML
2 INJECTION, SOLUTION INTRAVENOUS
Status: DISCONTINUED | OUTPATIENT
Start: 2018-07-23 | End: 2018-07-23

## 2018-07-23 RX ORDER — SODIUM BICARBONATE 650 MG/1
2600 TABLET ORAL 2 TIMES DAILY
Status: DISCONTINUED | OUTPATIENT
Start: 2018-07-23 | End: 2018-07-23 | Stop reason: HOSPADM

## 2018-07-23 RX ADMIN — TACROLIMUS 1.5 MG: 1 CAPSULE ORAL at 05:07

## 2018-07-23 RX ADMIN — ERTAPENEM SODIUM 1 G: 1 INJECTION, POWDER, LYOPHILIZED, FOR SOLUTION INTRAMUSCULAR; INTRAVENOUS at 02:07

## 2018-07-23 RX ADMIN — HYDRALAZINE HYDROCHLORIDE 10 MG: 20 INJECTION, SOLUTION INTRAMUSCULAR; INTRAVENOUS at 05:07

## 2018-07-23 RX ADMIN — ARFORMOTEROL TARTRATE 15 MCG: 15 SOLUTION RESPIRATORY (INHALATION) at 07:07

## 2018-07-23 RX ADMIN — HYDROCODONE BITARTRATE AND ACETAMINOPHEN 1 TABLET: 10; 325 TABLET ORAL at 08:07

## 2018-07-23 RX ADMIN — SODIUM BICARBONATE 2600 MG: 650 TABLET ORAL at 08:07

## 2018-07-23 RX ADMIN — BUDESONIDE 0.5 MG: 0.5 SUSPENSION RESPIRATORY (INHALATION) at 07:07

## 2018-07-23 RX ADMIN — INSULIN ASPART 6 UNITS: 100 INJECTION, SOLUTION INTRAVENOUS; SUBCUTANEOUS at 05:07

## 2018-07-23 RX ADMIN — PREDNISONE 5 MG: 5 TABLET ORAL at 08:07

## 2018-07-23 RX ADMIN — INSULIN ASPART 2 UNITS: 100 INJECTION, SOLUTION INTRAVENOUS; SUBCUTANEOUS at 05:07

## 2018-07-23 RX ADMIN — AMLODIPINE BESYLATE 5 MG: 5 TABLET ORAL at 08:07

## 2018-07-23 RX ADMIN — HYDROCODONE BITARTRATE AND ACETAMINOPHEN 1 TABLET: 10; 325 TABLET ORAL at 12:07

## 2018-07-23 RX ADMIN — INSULIN ASPART 8 UNITS: 100 INJECTION, SOLUTION INTRAVENOUS; SUBCUTANEOUS at 11:07

## 2018-07-23 RX ADMIN — COLLAGENASE SANTYL: 250 OINTMENT TOPICAL at 08:07

## 2018-07-23 RX ADMIN — VANCOMYCIN HYDROCHLORIDE 1250 MG: 10 INJECTION, POWDER, LYOPHILIZED, FOR SOLUTION INTRAVENOUS at 10:07

## 2018-07-23 RX ADMIN — CEFEPIME 2 G: 2 INJECTION, POWDER, FOR SOLUTION INTRAVENOUS at 05:07

## 2018-07-23 RX ADMIN — TACROLIMUS 1.5 MG: 1 CAPSULE ORAL at 08:07

## 2018-07-23 NOTE — PLAN OF CARE
Problem: Patient Care Overview  Goal: Plan of Care Review  Outcome: Ongoing (interventions implemented as appropriate)  Pt is maintaining adequate SpO2 levels on room air.Pt is receiving nebulized medications as ordered with NARN. Pt board in room updated with RT POC.

## 2018-07-23 NOTE — PLAN OF CARE
Problem: Patient Care Overview  Goal: Plan of Care Review  Outcome: Ongoing (interventions implemented as appropriate)  Pt had no adverse events during shift. Accuchecks >200 administered corrective insulin as ordered. Dressing changed during shift w/ NS, Santyl, gauze, and paper tape and pt tolerated well. Pt NSR on monitor. Pain well controlled with PRN PO Norco 10mg. VS stable. Did not require PRN Hydralazine. Pt complained of sweating not associated w/ fever or pain, encouraged to discuss with physician in the morning. Pt requested we restart Sodium Bicarb 2600mg PO BID that he takes at home. Chart reviewed. Will continue to monitor.

## 2018-07-23 NOTE — OP NOTE
The RUE was sterilely prepped and draped.  Lidocaine was used as a local anesthetic.  Using u/s guidance a 5 Serbian 40 cm picc was placed into the brachial vein into the SVC/right atrium junction.  Placement was verified using X Ray.  PICC was secured in place with attachment device and is ready to use.  Pt tolerated proc well without immediate complications.

## 2018-07-23 NOTE — NURSING
RENE picc has blood return on both lumen, flushes w/o difficulty. First dose of IV abx currently infusing.

## 2018-07-23 NOTE — PROGRESS NOTES
Clinical Pharmacy Progress Note: Vancomycin Dosing     Vancomycin Day #4   Estimated Creatinine Clearance: 53.2 mL/min (A) (based on SCr of 1.7 mg/dL (H)).   SCr trend: (stable x 1 day)   Lab Results   Component Value Date    WBC 5.96 07/23/2018   WBC trend: (WNL)         Tmax/24h: 98.1   Level:   Vancomycin, random [[id number]] Collected: 07/23/18 0439   Specimen: Blood from Blood Updated: 07/23/18 0546    Vancomycin, Random 16.2 ug/mL    Dx: Osteomyelitis/Cellulitis    Goal trough: 15-20 mcg/ml  Cultures:   Blood on 7/20 NGTD   Toe wound on 7/9- C. Freundii, Klebsiella oxytoca   Plan: Pharmacy will change vancomycin dose to 1250 mg IV every 18 hours in light of continued improvement/stability in renal function with consistent therapeutic levels  Next level due:  Vancomycin trough due 07/24/18 @ 0300 prior to 3rd scheduled dose.     Thank you for allowing us to participate in this patient's care.    Radha Lopez, PharmD 7/23/2018 8:06 AM

## 2018-07-23 NOTE — PLAN OF CARE
Patient's discharge noted. His home health is [facility] ( has been accepted) also his infusion company is [facility] ( [employer]). IMM signed.     Per Norma Liaison with [facility], patient has a large balance over $ 700.00  and his co pay is $3526.80  If CP puts him on a 6 month payment plan he would need to pay $ 800.00 per month.  per Dr Veliz the medication may be changed to  Vanco and rocephin. If changed the  co pay will be $1743.90  * his balance of $700.00 so his payment plan would be $400.00 per month. Update to Dr Edgar.      IMM signed

## 2018-07-23 NOTE — PLAN OF CARE
Dressing clean, dry, and intact. Denies pain. PICC line dressing clean, dry, and intact. Both lumen flushes without difficulty, blood return noted. Adequate for dc.

## 2018-07-23 NOTE — NURSING
Discharge instructions, medications, and appointments reviewed with patient. Teach back method used for PICC line care and dressing change. Pt verbalized understanding. First dose of Cefepime infusing.

## 2018-07-23 NOTE — NURSING
Dressing change done to left toe. Irrigated with NS, dressed with santyl, gauze, and paper tape. Pt tolerated well. Educated on dressing change, pt verbalized understanding.

## 2018-07-24 ENCOUNTER — TELEPHONE (OUTPATIENT)
Dept: TRANSPLANT | Facility: CLINIC | Age: 63
End: 2018-07-24

## 2018-07-24 LAB — POCT GLUCOSE: 251 MG/DL (ref 70–110)

## 2018-07-24 NOTE — TELEPHONE ENCOUNTER
Hosp d/c on IV abx via PICC line. Off MMF until foot wound infection resolved. Transplant will f/u with review in 30 days.

## 2018-07-24 NOTE — PLAN OF CARE
07/24/18 0828   Final Note   Assessment Type Final Discharge Note   Discharge Disposition Home-Health   Right Care Referral Info   Post Acute Recommendation Home-care   [facility] Name [facility]

## 2018-07-25 LAB
BACTERIA BLD CULT: NORMAL
BACTERIA BLD CULT: NORMAL

## 2018-07-30 ENCOUNTER — LAB VISIT (OUTPATIENT)
Dept: LAB | Facility: HOSPITAL | Age: 63
End: 2018-07-30
Attending: INTERNAL MEDICINE
Payer: MEDICARE

## 2018-07-30 DIAGNOSIS — L03.90 CELLULITIS OF SKIN WITH LYMPHANGITIS: Primary | ICD-10-CM

## 2018-07-30 LAB
ANION GAP SERPL CALC-SCNC: 16 MMOL/L
BASOPHILS # BLD AUTO: 0.02 K/UL
BASOPHILS NFR BLD: 0.4 %
BUN SERPL-MCNC: 22 MG/DL
CALCIUM SERPL-MCNC: 9.6 MG/DL
CHLORIDE SERPL-SCNC: 99 MMOL/L
CO2 SERPL-SCNC: 26 MMOL/L
CREAT SERPL-MCNC: 1.6 MG/DL
CRP SERPL-MCNC: 6.6 MG/L
DIFFERENTIAL METHOD: ABNORMAL
EOSINOPHIL # BLD AUTO: 0.2 K/UL
EOSINOPHIL NFR BLD: 2.9 %
ERYTHROCYTE [DISTWIDTH] IN BLOOD BY AUTOMATED COUNT: 13.4 %
EST. GFR  (AFRICAN AMERICAN): 51.5 ML/MIN/1.73 M^2
EST. GFR  (NON AFRICAN AMERICAN): 44.5 ML/MIN/1.73 M^2
GLUCOSE SERPL-MCNC: 221 MG/DL
HCT VFR BLD AUTO: 45 %
HGB BLD-MCNC: 14.2 G/DL
LYMPHOCYTES # BLD AUTO: 1.2 K/UL
LYMPHOCYTES NFR BLD: 21.4 %
MCH RBC QN AUTO: 29.9 PG
MCHC RBC AUTO-ENTMCNC: 31.6 G/DL
MCV RBC AUTO: 95 FL
MONOCYTES # BLD AUTO: 0.6 K/UL
MONOCYTES NFR BLD: 10 %
NEUTROPHILS # BLD AUTO: 3.6 K/UL
NEUTROPHILS NFR BLD: 65.1 %
PLATELET # BLD AUTO: 215 K/UL
PMV BLD AUTO: 10.6 FL
POTASSIUM SERPL-SCNC: 4.5 MMOL/L
RBC # BLD AUTO: 4.75 M/UL
SODIUM SERPL-SCNC: 141 MMOL/L
WBC # BLD AUTO: 5.52 K/UL

## 2018-07-30 PROCEDURE — 86140 C-REACTIVE PROTEIN: CPT | Mod: PO

## 2018-07-30 PROCEDURE — 85025 COMPLETE CBC W/AUTO DIFF WBC: CPT | Mod: PO

## 2018-07-30 PROCEDURE — 85651 RBC SED RATE NONAUTOMATED: CPT

## 2018-07-30 PROCEDURE — 80048 BASIC METABOLIC PNL TOTAL CA: CPT | Mod: PO

## 2018-07-31 LAB — ERYTHROCYTE [SEDIMENTATION RATE] IN BLOOD BY WESTERGREN METHOD: 13 MM/HR

## 2018-08-02 ENCOUNTER — OFFICE VISIT (OUTPATIENT)
Dept: PODIATRY | Facility: CLINIC | Age: 63
End: 2018-08-02
Payer: MEDICARE

## 2018-08-02 VITALS
WEIGHT: 225.5 LBS | DIASTOLIC BLOOD PRESSURE: 75 MMHG | HEART RATE: 80 BPM | BODY MASS INDEX: 31.57 KG/M2 | HEIGHT: 71 IN | SYSTOLIC BLOOD PRESSURE: 143 MMHG

## 2018-08-02 DIAGNOSIS — I73.9 PVD (PERIPHERAL VASCULAR DISEASE): ICD-10-CM

## 2018-08-02 DIAGNOSIS — L97.524 TOE ULCER, LEFT, WITH NECROSIS OF BONE: ICD-10-CM

## 2018-08-02 DIAGNOSIS — M86.172 ACUTE OSTEOMYELITIS OF TOE OF LEFT FOOT: Primary | ICD-10-CM

## 2018-08-02 PROCEDURE — 99999 PR PBB SHADOW E&M-EST. PATIENT-LVL III: CPT | Mod: PBBFAC,,, | Performed by: PODIATRIST

## 2018-08-02 PROCEDURE — 3077F SYST BP >= 140 MM HG: CPT | Mod: CPTII,S$GLB,, | Performed by: PODIATRIST

## 2018-08-02 PROCEDURE — 3078F DIAST BP <80 MM HG: CPT | Mod: CPTII,S$GLB,, | Performed by: PODIATRIST

## 2018-08-02 PROCEDURE — 99212 OFFICE O/P EST SF 10 MIN: CPT | Mod: S$GLB,,, | Performed by: PODIATRIST

## 2018-08-02 PROCEDURE — 3008F BODY MASS INDEX DOCD: CPT | Mod: CPTII,S$GLB,, | Performed by: PODIATRIST

## 2018-08-02 NOTE — PATIENT INSTRUCTIONS
[facility]   [id number] [facility] [address] [facility] [place] [address]  [phone]  Dr. Tamara Rodríguez, August 7, 2018  1:45pm

## 2018-08-02 NOTE — PROGRESS NOTES
Subjective:     Patient ID: Lavelle Ladd is a 63 y.o. male.    Chief Complaint: Foot Problem (diabetic patient, left great toe, 3rd and 4th toe needs to be examined )    HPI: This 63 year old male returns to the clinic 7 weeks status post post status post left toe ulcer debridement procedure. Patient has no complaints of fever chills or sweats. Positive pain, rates pain 2/10 Patient states dressing was kept dry, clean, and intact. Patient admits discoloration of left toe 4. Patient states he has been applying the Santyl to the toes as instructed. Patient states home health has not come out much, but the antibiotic people have been coming out.       Patient Active Problem List   Diagnosis    Renal hypertension    GERD (gastroesophageal reflux disease)    Peptic ulcer disease    Malignant HTN with heart disease, w/o CHF, with chronic kidney disease    Acidosis    Essential hypertension    Acute diastolic heart failure    Gastroparesis     donor kidney transplant for DM 16    Prophylactic immunotherapy    Adverse effect of glucocorticoids and synthetic analogues, sequela    Osteoarthritis of lumbar spine    Osteoarthritis of thoracic spine with myelopathy    Type 2 diabetes mellitus with hyperglycemia, with long-term current use of insulin    Coronary artery disease involving native coronary artery of native heart without angina pectoris    Hyperlipidemia    Chronic obstructive pulmonary disease    Ulnar neuropathy    Chronic kidney disease (CKD), stage III (moderate)    Reactive airway disease    Kidney transplant rejection    Type 2 diabetes mellitus with retinopathy, with long-term current use of insulin    Type 2 diabetes mellitus with diabetic neuropathy, with long-term current use of insulin    H/O amputation    Cavitary lesion of lung    Opacity of lung on imaging study    Bacteremia due to Gram-negative bacteria    ESBL (extended spectrum beta-lactamase) producing  "bacteria infection    History of hepatitis C, s/p successful Rx w/ SVR12 - 4/2017    Kidney transplant recipient    Intractable vomiting with nausea    PVD (peripheral vascular disease)    Chronic bilateral low back pain with left-sided sciatica    Diabetic polyneuropathy    Other chest pain    Disc disease, degenerative, lumbar or lumbosacral    Numbness and tingling    Adjustment insomnia    Lumbar stenosis with neurogenic claudication    Open wound of left great toe    Gangrene    Acute lumbar radiculopathy    Chronic lumbar radiculopathy    Gangrene of toe of left foot    Long term current use of immunosuppressive drug    Peripheral vascular disease    Diabetic foot infection    Cellulitis       Medication List with Changes/Refills   Current Medications    AMLODIPINE (NORVASC) 2.5 MG TABLET    Take 1 tablet (2.5 mg total) by mouth once daily.    ASPIRIN (ECOTRIN) 81 MG EC TABLET    Take 1 tablet (81 mg total) by mouth once daily.    BD INSULIN PEN NEEDLE UF SHORT 31 GAUGE X 5/16" NDLE        BD INSULIN SYRINGE ULTRA-FINE 1 ML 31 GAUGE X 5/16 SYRG    USE ONE AS DIRECTED FOUR TIMES DAILY WITH MEALS AND AT BEDTIME    BLOOD SUGAR DIAGNOSTIC STRP    1 each by Misc.(Non-Drug; Combo Route) route 3 (three) times daily.    BLOOD-GLUCOSE METER KIT    Use as instructed    BUDESONIDE-FORMOTEROL 160-4.5 MCG (SYMBICORT) 160-4.5 MCG/ACTUATION HFAA    Inhale 2 puffs into the lungs every 12 (twelve) hours. Wash out mouth after using    CEFEPIME (MAXIPIME) 2 GRAM INJECTION    Inject 2 g into the vein every 12 (twelve) hours.    ERGOCALCIFEROL (ERGOCALCIFEROL) 50,000 UNIT CAP    Take 1 capsule (50,000 Units total) by mouth every 7 days. Take on Mondays    HYDROCODONE-ACETAMINOPHEN (NORCO)  MG PER TABLET    Take 1 tablet by mouth every 6 (six) hours as needed.    INHALATION DEVICE (BREATHERITE VALVED MDI CHAMBER)    Use as directed for inhalation.    INSULIN NPH (NOVOLIN N) 100 UNIT/ML INJECTION    Take 25 " "units subq with breakfast and 15 units at bedtime    LANCETS MISC    1 each by Misc.(Non-Drug; Combo Route) route 3 (three) times daily.    NOVOLIN R REGULAR U-100 INSULN 100 UNIT/ML INJECTION    INJECT 6 UNITS WITH BREAKFAST AND 8 UNITS WITH DINNER, SUBCUTANEOUSLY 30 MIN BEFORE MEAL.    ONDANSETRON (ZOFRAN-ODT) 4 MG TBDL    Take 1 tablet (4 mg total) by mouth every 8 (eight) hours as needed.    PEN NEEDLE, DIABETIC (EASY COMFORT PEN NEEDLES) 32 GAUGE X 5/32" NDLE    Inject 1 each into the skin 3 (three) times daily.    PEN NEEDLE, DIABETIC 31 GAUGE X 1/4" NDLE    1 each by Misc.(Non-Drug; Combo Route) route 4 (four) times daily.    PREDNISONE (DELTASONE) 5 MG TABLET    Take 1 tablet (5 mg total) by mouth once daily.    SODIUM BICARBONATE 650 MG TABLET    Take 4 tablets (2,600 mg total) by mouth 2 (two) times daily.    TACROLIMUS (PROGRAF) 0.5 MG CAP    Take 3 capsules (1.5 mg total) by mouth every 12 (twelve) hours. Z94.0 Kidney Transplant    ZOLPIDEM (AMBIEN) 5 MG TAB    Take 2 tablets (10 mg total) by mouth nightly as needed.       Review of patient's allergies indicates:  No Known Allergies    Past Surgical History:   Procedure Laterality Date    AORTOGRAPHY WITH SERIALOGRAPHY N/A 6/14/2018    Procedure: LEFT LEG ANGIOGRAM;  Surgeon: Donal Mcdonald MD;  Location: [facility] CATH LAB;  Service: Vascular;  Laterality: N/A;    av bovine graft      Left UE    AV FISTULA PLACEMENT      left UE    CARDIAC CATHETERIZATION  02/2015    KIDNEY TRANSPLANT  05/21/2016    LEG AMPUTATION THROUGH KNEE  2011    right LE, started as nail puncture leading to diabetic ulcer       Family History   Problem Relation Age of Onset    Cancer Father     Diabetes Father     Heart failure Father     Stroke Father     Heart failure Mother     Kidney disease Neg Hx        Social History     Social History    Marital status: Single     Spouse name: N/A    Number of children: N/A    Years of education: N/A     Occupational History    " "Disabled      Disabled     Social History Main Topics    Smoking status: Former Smoker     Packs/day: 1.00     Years: 40.00     Quit date: 1/11/2013    Smokeless tobacco: Never Used    Alcohol use 3.6 oz/week     6 Cans of beer per week      Comment: 6 beers/week    Drug use: No    Sexual activity: No     Other Topics Concern    Not on file     Social History Narrative    Lives alone. Retired from construction and various jobs, now retired. Would like to return to work PT to alleviate boredom.       Vitals:    08/02/18 0906   BP: (!) 143/75   Pulse: 80   Weight: 102.3 kg (225 lb 8.5 oz)   Height: 5' 11" (1.803 m)   PainSc:   5       Hemoglobin A1C   Date Value Ref Range Status   07/07/2018 10.2 (H) 4.0 - 5.6 % Final     Comment:     ADA Screening Guidelines:  5.7-6.4%  Consistent with prediabetes  >or=6.5%  Consistent with diabetes  High levels of fetal hemoglobin interfere with the HbA1C  assay. Heterozygous hemoglobin variants (HbS, HgC, etc)do  not significantly interfere with this assay.   However, presence of multiple variants may affect accuracy.     06/12/2018 9.9 (H) 4.0 - 5.6 % Final     Comment:     ADA Screening Guidelines:  5.7-6.4%  Consistent with prediabetes  >or=6.5%  Consistent with diabetes  High levels of fetal hemoglobin interfere with the HbA1C  assay. Heterozygous hemoglobin variants (HbS, HgC, etc)do  not significantly interfere with this assay.   However, presence of multiple variants may affect accuracy.     11/03/2017 7.1 (H) 4.0 - 5.6 % Final     Comment:     According to ADA guidelines, hemoglobin A1c <7.0% represents  optimal control in non-pregnant diabetic patients. Different  metrics may apply to specific patient populations.   Standards of Medical Care in Diabetes-2016.  For the purpose of screening for the presence of diabetes:  <5.7%     Consistent with the absence of diabetes  5.7-6.4%  Consistent with increasing risk for diabetes   (prediabetes)  >or=6.5%  Consistent with " diabetes  Currently, no consensus exists for use of hemoglobin A1c  for diagnosis of diabetes for children.  This Hemoglobin A1c assay has significant interference with fetal   hemoglobin   (HbF). The results are invalid for patients with abnormal amounts of   HbF,   including those with known Hereditary Persistence   of Fetal Hemoglobin. Heterozygous hemoglobin variants (HbAS, HbAC,   HbAD, HbAE, HbA2) do not significantly interfere with this assay;   however, presence of multiple variants in a sample may impact the %   interference.         Review of Systems   Constitutional: Negative for chills and fever.   Respiratory: Negative for shortness of breath.    Cardiovascular: Positive for claudication. Negative for chest pain, palpitations, orthopnea and leg swelling.   Gastrointestinal: Negative for diarrhea, nausea and vomiting.   Musculoskeletal: Negative for joint pain.   Skin: Negative for rash.   Neurological: Positive for sensory change. Negative for dizziness, tingling, focal weakness and weakness.   Psychiatric/Behavioral: Negative.              Objective:      Physical examination: General: Patient is in no acute distress, alert and oriented x 3.  Dressing to left foot clean, dry, and intact.   Lower Extremity Exam:  Vascular: Dorsalis pedis and Posterior tibial pulses faint on left foot.  Capillary fill time blanchabe  to toes on left foot. Toes are cold. No edema noted on left foot.   Dermatologic: Left hallux dorsal ulcer noted with fibrotic base. Left distal hallux ulcer noted with eschar base. No exposed bone noted. Positive decreased erythema toe left toe 1,3,4,5.   Neurological: Light touch sensation intact to left foot.   Musculoskeletal: Negative pain on palpation/ROM of left foot. Right BKA noted.     TEST RESULTS: Pathology bone culture results reveals acute osteomyelitis and aerobic cultures reveals               Assessment:       Encounter Diagnoses   Name Primary?    Acute osteomyelitis of  toe of left foot Yes    Toe ulcer, left, with necrosis of bone     PVD (peripheral vascular disease)          Plan:   Acute osteomyelitis of toe of left foot    Toe ulcer, left, with necrosis of bone    PVD (peripheral vascular disease)      I counseled the patient on his conditions, regarding findings of my examination, my impressions, and usual treatment plan.   Short-term goals include maintaining good offloading and minimizing bioburden, promoting granulation and epithelialization to healing.  Long-term goals include keeping the wound healed by good offloading and medical management under the direction of internist.  Wound was irrigated with sterile saline and cleansed with wound cleanse cloth. The patient tolerated this well. Wound was then dressed with Santyl, gauze, and papertape. Patient was given instructions on daily dressing changes. Patient was also instructed on the importance of keeping the wound and dressings clean dry and intact and keeping pressure off the wound area until complete healing of the wound. Home wound care instructions ar provided.  Patient was scheduled with Dr. Mcdonald (vascular) for 8/7/18 for hospital f/u gangrene.   Patient to continue home health and IV antibiotics.   Follow-up:Patient is to return to the clinic in 2 weeks  for follow-up but should call [facility] immediately if any signs of infection, such as fever, chills, sweats, increased redness or pain.                    [facility]

## 2018-08-16 ENCOUNTER — OFFICE VISIT (OUTPATIENT)
Dept: PODIATRY | Facility: CLINIC | Age: 63
End: 2018-08-16
Payer: MEDICARE

## 2018-08-16 ENCOUNTER — HOSPITAL ENCOUNTER (OUTPATIENT)
Dept: RADIOLOGY | Facility: HOSPITAL | Age: 63
Discharge: HOME | End: 2018-08-16
Attending: PODIATRIST
Payer: MEDICARE

## 2018-08-16 VITALS
HEART RATE: 90 BPM | BODY MASS INDEX: 32.07 KG/M2 | HEIGHT: 71 IN | WEIGHT: 229.06 LBS | SYSTOLIC BLOOD PRESSURE: 142 MMHG | DIASTOLIC BLOOD PRESSURE: 76 MMHG

## 2018-08-16 DIAGNOSIS — I73.9 PVD (PERIPHERAL VASCULAR DISEASE): ICD-10-CM

## 2018-08-16 DIAGNOSIS — M86.172 ACUTE OSTEOMYELITIS OF TOE OF LEFT FOOT: ICD-10-CM

## 2018-08-16 DIAGNOSIS — L97.524 TOE ULCER, LEFT, WITH NECROSIS OF BONE: ICD-10-CM

## 2018-08-16 DIAGNOSIS — M86.172 ACUTE OSTEOMYELITIS OF TOE OF LEFT FOOT: Primary | ICD-10-CM

## 2018-08-16 PROCEDURE — 99999 PR PBB SHADOW E&M-EST. PATIENT-LVL III: CPT | Mod: PBBFAC,,, | Performed by: PODIATRIST

## 2018-08-16 PROCEDURE — 99024 POSTOP FOLLOW-UP VISIT: CPT | Mod: S$GLB,,, | Performed by: PODIATRIST

## 2018-08-16 PROCEDURE — 73630 X-RAY EXAM OF FOOT: CPT | Mod: TC,FY,PO,LT

## 2018-08-16 PROCEDURE — 73630 X-RAY EXAM OF FOOT: CPT | Mod: 26,LT,, | Performed by: RADIOLOGY

## 2018-08-16 NOTE — PROGRESS NOTES
Subjective:     Patient ID: Lavelle Ladd is a 63 y.o. male.    Chief Complaint: toe problem (diabetic patient, left great toe and left 4th toe )    HPI: This 63 year old male returns to the clinic 9 weeks status post post status post left toe ulcer debridement procedure. Patient has no complaints of fever chills or sweats. Positive pain, rates pain 2/10 Patient states dressing was kept dry, clean, and intact. Patienet states he did see Dr. Mcdonald and circulation is fine. Patient states he has been applying the Santyl to the toes as instructed. Patient states home health has not come out much, but the antibiotic people have been coming out.       Patient Active Problem List   Diagnosis    Renal hypertension    GERD (gastroesophageal reflux disease)    Peptic ulcer disease    Malignant HTN with heart disease, w/o CHF, with chronic kidney disease    Acidosis    Essential hypertension    Acute diastolic heart failure    Gastroparesis     donor kidney transplant for DM 16    Prophylactic immunotherapy    Adverse effect of glucocorticoids and synthetic analogues, sequela    Osteoarthritis of lumbar spine    Osteoarthritis of thoracic spine with myelopathy    Type 2 diabetes mellitus with hyperglycemia, with long-term current use of insulin    Coronary artery disease involving native coronary artery of native heart without angina pectoris    Hyperlipidemia    Chronic obstructive pulmonary disease    Ulnar neuropathy    Chronic kidney disease (CKD), stage III (moderate)    Reactive airway disease    Kidney transplant rejection    Type 2 diabetes mellitus with retinopathy, with long-term current use of insulin    Type 2 diabetes mellitus with diabetic neuropathy, with long-term current use of insulin    H/O amputation    Cavitary lesion of lung    Opacity of lung on imaging study    Bacteremia due to Gram-negative bacteria    ESBL (extended spectrum beta-lactamase) producing  "bacteria infection    History of hepatitis C, s/p successful Rx w/ SVR12 - 4/2017    Kidney transplant recipient    Intractable vomiting with nausea    PVD (peripheral vascular disease)    Chronic bilateral low back pain with left-sided sciatica    Diabetic polyneuropathy    Other chest pain    Disc disease, degenerative, lumbar or lumbosacral    Numbness and tingling    Adjustment insomnia    Lumbar stenosis with neurogenic claudication    Open wound of left great toe    Gangrene    Acute lumbar radiculopathy    Chronic lumbar radiculopathy    Gangrene of toe of left foot    Long term current use of immunosuppressive drug    Peripheral vascular disease    Diabetic foot infection    Cellulitis       Medication List with Changes/Refills   Current Medications    AMLODIPINE (NORVASC) 2.5 MG TABLET    Take 1 tablet (2.5 mg total) by mouth once daily.    ASPIRIN (ECOTRIN) 81 MG EC TABLET    Take 1 tablet (81 mg total) by mouth once daily.    BD INSULIN PEN NEEDLE UF SHORT 31 GAUGE X 5/16" NDLE        BD INSULIN SYRINGE ULTRA-FINE 1 ML 31 GAUGE X 5/16 SYRG    USE ONE AS DIRECTED FOUR TIMES DAILY WITH MEALS AND AT BEDTIME    BLOOD SUGAR DIAGNOSTIC STRP    1 each by Misc.(Non-Drug; Combo Route) route 3 (three) times daily.    BLOOD-GLUCOSE METER KIT    Use as instructed    BUDESONIDE-FORMOTEROL 160-4.5 MCG (SYMBICORT) 160-4.5 MCG/ACTUATION HFAA    Inhale 2 puffs into the lungs every 12 (twelve) hours. Wash out mouth after using    CEFEPIME (MAXIPIME) 2 GRAM INJECTION    Inject 2 g into the vein every 12 (twelve) hours.    ERGOCALCIFEROL (ERGOCALCIFEROL) 50,000 UNIT CAP    Take 1 capsule (50,000 Units total) by mouth every 7 days. Take on Mondays    HYDROCODONE-ACETAMINOPHEN (NORCO)  MG PER TABLET    Take 1 tablet by mouth every 6 (six) hours as needed.    INHALATION DEVICE (BREATHERITE VALVED MDI CHAMBER)    Use as directed for inhalation.    INSULIN NPH (NOVOLIN N) 100 UNIT/ML INJECTION    Take 25 " "units subq with breakfast and 15 units at bedtime    LANCETS MISC    1 each by Misc.(Non-Drug; Combo Route) route 3 (three) times daily.    NOVOLIN R REGULAR U-100 INSULN 100 UNIT/ML INJECTION    INJECT 6 UNITS WITH BREAKFAST AND 8 UNITS WITH DINNER, SUBCUTANEOUSLY 30 MIN BEFORE MEAL.    ONDANSETRON (ZOFRAN-ODT) 4 MG TBDL    Take 1 tablet (4 mg total) by mouth every 8 (eight) hours as needed.    PEN NEEDLE, DIABETIC (EASY COMFORT PEN NEEDLES) 32 GAUGE X 5/32" NDLE    Inject 1 each into the skin 3 (three) times daily.    PEN NEEDLE, DIABETIC 31 GAUGE X 1/4" NDLE    1 each by Misc.(Non-Drug; Combo Route) route 4 (four) times daily.    PREDNISONE (DELTASONE) 5 MG TABLET    Take 1 tablet (5 mg total) by mouth once daily.    SODIUM BICARBONATE 650 MG TABLET    Take 4 tablets (2,600 mg total) by mouth 2 (two) times daily.    TACROLIMUS (PROGRAF) 0.5 MG CAP    Take 3 capsules (1.5 mg total) by mouth every 12 (twelve) hours. Z94.0 Kidney Transplant    ZOLPIDEM (AMBIEN) 5 MG TAB    Take 2 tablets (10 mg total) by mouth nightly as needed.       Review of patient's allergies indicates:  No Known Allergies    Past Surgical History:   Procedure Laterality Date    av bovine graft      Left UE    AV FISTULA PLACEMENT      left UE    CARDIAC CATHETERIZATION  02/2015    KIDNEY TRANSPLANT  05/21/2016    LEG AMPUTATION THROUGH KNEE  2011    right LE, started as nail puncture leading to diabetic ulcer       Family History   Problem Relation Age of Onset    Cancer Father     Diabetes Father     Heart failure Father     Stroke Father     Heart failure Mother     Kidney disease Neg Hx        Social History     Socioeconomic History    Marital status: Single     Spouse name: Not on file    Number of children: Not on file    Years of education: Not on file    Highest education level: Not on file   Social Needs    Financial resource strain: Not on file    Food insecurity - worry: Not on file    Food insecurity - inability: " "Not on file    Transportation needs - medical: Not on file    Transportation needs - non-medical: Not on file   Occupational History    Occupation: Disabled     Employer: DISABLED   Tobacco Use    Smoking status: Former Smoker     Packs/day: 1.00     Years: 40.00     Pack years: 40.00     Last attempt to quit: 2013     Years since quittin.5    Smokeless tobacco: Never Used   Substance and Sexual Activity    Alcohol use: Yes     Alcohol/week: 3.6 oz     Types: 6 Cans of beer per week     Comment: 6 beers/week    Drug use: No    Sexual activity: No   Other Topics Concern    Not on file   Social History Narrative    Lives alone. Retired from construction and various jobs, now retired. Would like to return to work PT to alleviate boredom.       Vitals:    18 0842   BP: (!) 142/76   Pulse: 90   Weight: 103.9 kg (229 lb 0.9 oz)   Height: 5' 11" (1.803 m)   PainSc: 0-No pain       Hemoglobin A1C   Date Value Ref Range Status   2018 10.2 (H) 4.0 - 5.6 % Final     Comment:     ADA Screening Guidelines:  5.7-6.4%  Consistent with prediabetes  >or=6.5%  Consistent with diabetes  High levels of fetal hemoglobin interfere with the HbA1C  assay. Heterozygous hemoglobin variants (HbS, HgC, etc)do  not significantly interfere with this assay.   However, presence of multiple variants may affect accuracy.     2018 9.9 (H) 4.0 - 5.6 % Final     Comment:     ADA Screening Guidelines:  5.7-6.4%  Consistent with prediabetes  >or=6.5%  Consistent with diabetes  High levels of fetal hemoglobin interfere with the HbA1C  assay. Heterozygous hemoglobin variants (HbS, HgC, etc)do  not significantly interfere with this assay.   However, presence of multiple variants may affect accuracy.     2017 7.1 (H) 4.0 - 5.6 % Final     Comment:     According to ADA guidelines, hemoglobin A1c <7.0% represents  optimal control in non-pregnant diabetic patients. Different  metrics may apply to specific patient " populations.   Standards of Medical Care in Diabetes-2016.  For the purpose of screening for the presence of diabetes:  <5.7%     Consistent with the absence of diabetes  5.7-6.4%  Consistent with increasing risk for diabetes   (prediabetes)  >or=6.5%  Consistent with diabetes  Currently, no consensus exists for use of hemoglobin A1c  for diagnosis of diabetes for children.  This Hemoglobin A1c assay has significant interference with fetal   hemoglobin   (HbF). The results are invalid for patients with abnormal amounts of   HbF,   including those with known Hereditary Persistence   of Fetal Hemoglobin. Heterozygous hemoglobin variants (HbAS, HbAC,   HbAD, HbAE, HbA2) do not significantly interfere with this assay;   however, presence of multiple variants in a sample may impact the %   interference.         Review of Systems   Constitutional: Negative for chills and fever.   Respiratory: Negative for shortness of breath.    Cardiovascular: Positive for claudication. Negative for chest pain, palpitations, orthopnea and leg swelling.   Gastrointestinal: Negative for diarrhea, nausea and vomiting.   Musculoskeletal: Negative for joint pain.   Skin: Negative for rash.   Neurological: Positive for sensory change. Negative for dizziness, tingling, focal weakness and weakness.   Psychiatric/Behavioral: Negative.              Objective:      Physical examination: General: Patient is in no acute distress, alert and oriented x 3.  Dressing to left foot clean, dry, and intact.   Lower Extremity Exam:  Vascular: Dorsalis pedis and Posterior tibial pulses faint on left foot.  Capillary fill time blanchabe  to toes on left foot. Toes are cold. No edema noted on left foot.   Dermatologic: Left hallux dorsal ulcer noted with fibrotic base. Left distal hallux ulcer noted with eschar base. Exposed bone noted. Positive decreased erythema toe left toe 1,3,4,5. Dry eschar noted to left 4th toe.   Neurological: Light touch sensation  intact to left foot.   Musculoskeletal: Negative pain on palpation/ROM of left foot. Right BKA noted.     TEST RESULTS: Pathology bone culture results reveals acute osteomyelitis and aerobic cultures reveals                       Assessment:       Encounter Diagnoses   Name Primary?    Acute osteomyelitis of toe of left foot Yes    Toe ulcer, left, with necrosis of bone     PVD (peripheral vascular disease)          Plan:   Acute osteomyelitis of toe of left foot  -     X-Ray Foot Complete Left; Future; Expected date: 08/16/2018    Toe ulcer, left, with necrosis of bone  -     X-Ray Foot Complete Left; Future; Expected date: 08/16/2018    PVD (peripheral vascular disease)  -     X-Ray Foot Complete Left; Future; Expected date: 08/16/2018      I counseled the patient on his conditions, regarding findings of my examination, my impressions, and usual treatment plan.   Short-term goals include maintaining good offloading and minimizing bioburden, promoting granulation and epithelialization to healing.  Long-term goals include keeping the wound healed by good offloading and medical management under the direction of internist.  Wound was irrigated with sterile saline and cleansed with wound cleanse cloth. The patient tolerated this well. Wound was then dressed with Santyl, gauze, and papertape. Patient was given instructions on daily dressing changes. Patient was also instructed on the importance of keeping the wound and dressings clean dry and intact and keeping pressure off the wound area until complete healing of the wound. Home wound care instructions ar provided.  Patient to continue home health and IV antibiotics.   Follow-up:Patient is to return to the clinic in 2 weeks  for follow-up but should call [facility] immediately if any signs of infection, such as fever, chills, sweats, increased redness or pain.                    Katerina Magaña DPM  [facility]

## 2018-08-20 ENCOUNTER — LAB VISIT (OUTPATIENT)
Dept: LAB | Facility: HOSPITAL | Age: 63
End: 2018-08-20
Attending: INTERNAL MEDICINE
Payer: MEDICARE

## 2018-08-20 DIAGNOSIS — Z94.0 KIDNEY REPLACED BY TRANSPLANT: ICD-10-CM

## 2018-08-20 LAB
ALBUMIN SERPL BCP-MCNC: 3.1 G/DL
ANION GAP SERPL CALC-SCNC: 10 MMOL/L
BASOPHILS # BLD AUTO: 0.04 K/UL
BASOPHILS NFR BLD: 0.8 %
BUN SERPL-MCNC: 23 MG/DL
CALCIUM SERPL-MCNC: 10.1 MG/DL
CHLORIDE SERPL-SCNC: 106 MMOL/L
CO2 SERPL-SCNC: 24 MMOL/L
CREAT SERPL-MCNC: 1.3 MG/DL
DIFFERENTIAL METHOD: ABNORMAL
EOSINOPHIL # BLD AUTO: 0.3 K/UL
EOSINOPHIL NFR BLD: 5.2 %
ERYTHROCYTE [DISTWIDTH] IN BLOOD BY AUTOMATED COUNT: 13 %
EST. GFR  (AFRICAN AMERICAN): >60 ML/MIN/1.73 M^2
EST. GFR  (NON AFRICAN AMERICAN): 57.3 ML/MIN/1.73 M^2
GLUCOSE SERPL-MCNC: 112 MG/DL
HCT VFR BLD AUTO: 46.1 %
HGB BLD-MCNC: 14.4 G/DL
IMM GRANULOCYTES # BLD AUTO: 0.01 K/UL
IMM GRANULOCYTES NFR BLD AUTO: 0.2 %
LYMPHOCYTES # BLD AUTO: 1.4 K/UL
LYMPHOCYTES NFR BLD: 27.5 %
MAGNESIUM SERPL-MCNC: 1.8 MG/DL
MCH RBC QN AUTO: 29.5 PG
MCHC RBC AUTO-ENTMCNC: 31.2 G/DL
MCV RBC AUTO: 95 FL
MONOCYTES # BLD AUTO: 0.6 K/UL
MONOCYTES NFR BLD: 12.4 %
NEUTROPHILS # BLD AUTO: 2.7 K/UL
NEUTROPHILS NFR BLD: 53.9 %
NRBC BLD-RTO: 0 /100 WBC
PHOSPHATE SERPL-MCNC: 3.2 MG/DL
PLATELET # BLD AUTO: 198 K/UL
PMV BLD AUTO: 10.8 FL
POTASSIUM SERPL-SCNC: 4.4 MMOL/L
RBC # BLD AUTO: 4.88 M/UL
SODIUM SERPL-SCNC: 140 MMOL/L
WBC # BLD AUTO: 4.98 K/UL

## 2018-08-20 PROCEDURE — 80197 ASSAY OF TACROLIMUS: CPT

## 2018-08-20 PROCEDURE — 80069 RENAL FUNCTION PANEL: CPT

## 2018-08-20 PROCEDURE — 36415 COLL VENOUS BLD VENIPUNCTURE: CPT | Mod: PO

## 2018-08-20 PROCEDURE — 85025 COMPLETE CBC W/AUTO DIFF WBC: CPT

## 2018-08-20 PROCEDURE — 83735 ASSAY OF MAGNESIUM: CPT

## 2018-08-21 ENCOUNTER — TELEPHONE (OUTPATIENT)
Dept: TRANSPLANT | Facility: CLINIC | Age: 63
End: 2018-08-21

## 2018-08-21 LAB — TACROLIMUS BLD-MCNC: 4.1 NG/ML

## 2018-08-21 NOTE — TELEPHONE ENCOUNTER
Call placed to patient. Labs reviewed with no changes. Message left on voicemail in inquiry of foot wound and still off [facility]. Contact info provided.

## 2018-08-31 ENCOUNTER — OFFICE VISIT (OUTPATIENT)
Dept: PODIATRY | Facility: CLINIC | Age: 63
End: 2018-08-31
Payer: MEDICARE

## 2018-08-31 VITALS
SYSTOLIC BLOOD PRESSURE: 167 MMHG | WEIGHT: 229.5 LBS | DIASTOLIC BLOOD PRESSURE: 80 MMHG | HEART RATE: 82 BPM | BODY MASS INDEX: 32.13 KG/M2 | HEIGHT: 71 IN

## 2018-08-31 DIAGNOSIS — I73.9 PVD (PERIPHERAL VASCULAR DISEASE): ICD-10-CM

## 2018-08-31 DIAGNOSIS — L97.524 TOE ULCER, LEFT, WITH NECROSIS OF BONE: ICD-10-CM

## 2018-08-31 DIAGNOSIS — L97.521 ISCHEMIC TOE ULCER, LEFT, LIMITED TO BREAKDOWN OF SKIN: ICD-10-CM

## 2018-08-31 DIAGNOSIS — M86.172 ACUTE OSTEOMYELITIS OF TOE OF LEFT FOOT: Primary | ICD-10-CM

## 2018-08-31 PROCEDURE — 99999 PR PBB SHADOW E&M-EST. PATIENT-LVL III: CPT | Mod: PBBFAC,,, | Performed by: PODIATRIST

## 2018-08-31 PROCEDURE — 99213 OFFICE O/P EST LOW 20 MIN: CPT | Mod: PBBFAC,PO | Performed by: PODIATRIST

## 2018-08-31 PROCEDURE — 99024 POSTOP FOLLOW-UP VISIT: CPT | Mod: ,,, | Performed by: PODIATRIST

## 2018-08-31 RX ORDER — GABAPENTIN 300 MG/1
300 CAPSULE ORAL 3 TIMES DAILY
Status: ON HOLD | COMMUNITY
Start: 2018-08-22 | End: 2018-11-13 | Stop reason: SDUPTHER

## 2018-08-31 NOTE — PROGRESS NOTES
Subjective:     Patient ID: Lavelle Ladd is a 63 y.o. male.    Chief Complaint: Foot Problem (left great toe, and left 4th toe )    HPI: This 63 year old male returns to the clinic 12  weeks status post post status post left toe ulcer debridement procedure. Patient has no complaints of fever chills or sweats. Positive pain, rates pain 2/10 Patient states dressing was kept dry, clean, and intact. Patienet states he did see Dr. Mcdonald and circulation is fine. Patient states he has been applying the Santyl to the toes as instructed. Patient states home health has not come out much, but the antibiotic people have been coming out.       Patient Active Problem List   Diagnosis    Renal hypertension    GERD (gastroesophageal reflux disease)    Peptic ulcer disease    Malignant HTN with heart disease, w/o CHF, with chronic kidney disease    Acidosis    Essential hypertension    Acute diastolic heart failure    Gastroparesis     donor kidney transplant for DM 16    Prophylactic immunotherapy    Adverse effect of glucocorticoids and synthetic analogues, sequela    Osteoarthritis of lumbar spine    Osteoarthritis of thoracic spine with myelopathy    Type 2 diabetes mellitus with hyperglycemia, with long-term current use of insulin    Coronary artery disease involving native coronary artery of native heart without angina pectoris    Hyperlipidemia    Chronic obstructive pulmonary disease    Ulnar neuropathy    Chronic kidney disease (CKD), stage III (moderate)    Reactive airway disease    Kidney transplant rejection    Type 2 diabetes mellitus with retinopathy, with long-term current use of insulin    Type 2 diabetes mellitus with diabetic neuropathy, with long-term current use of insulin    H/O amputation    Cavitary lesion of lung    Opacity of lung on imaging study    Bacteremia due to Gram-negative bacteria    ESBL (extended spectrum beta-lactamase) producing bacteria infection     History of hepatitis C, s/p successful Rx w/ SVR12 - 4/2017    Kidney transplant recipient    Intractable vomiting with nausea    PVD (peripheral vascular disease)    Chronic bilateral low back pain with left-sided sciatica    Diabetic polyneuropathy    Other chest pain    Disc disease, degenerative, lumbar or lumbosacral    Numbness and tingling    Adjustment insomnia    Lumbar stenosis with neurogenic claudication    Open wound of left great toe    Gangrene    Acute lumbar radiculopathy    Chronic lumbar radiculopathy    Gangrene of toe of left foot    Long term current use of immunosuppressive drug    Peripheral vascular disease    Diabetic foot infection    Cellulitis          Medication List           Accurate as of 8/31/18 11:59 PM. If you have any questions, ask your nurse or doctor.               CONTINUE taking these medications    amLODIPine 2.5 MG tablet  Commonly known as:  NORVASC  Take 1 tablet (2.5 mg total) by mouth once daily.     aspirin 81 MG EC tablet  Commonly known as:  ECOTRIN  Take 1 tablet (81 mg total) by mouth once daily.     BD INSULIN SYRINGE ULTRA-FINE 1 mL 31 gauge x 5/16 Syrg  Generic drug:  insulin syringe-needle U-100  USE ONE AS DIRECTED FOUR TIMES DAILY WITH MEALS AND AT BEDTIME     blood sugar diagnostic Strp  1 each by Misc.(Non-Drug; Combo Route) route 3 (three) times daily.     blood-glucose meter kit  Use as instructed     budesonide-formoterol 160-4.5 mcg 160-4.5 mcg/actuation Hfaa  Commonly known as:  SYMBICORT  Inhale 2 puffs into the lungs every 12 (twelve) hours. Wash out mouth after using     ceFEPIme 2 gram injection  Commonly known as:  MAXIPIME  Inject 2 g into the vein every 12 (twelve) hours.     ergocalciferol 50,000 unit Cap  Commonly known as:  ERGOCALCIFEROL  Take 1 capsule (50,000 Units total) by mouth every 7 days. Take on Mondays     gabapentin 300 MG capsule  Commonly known as:  NEURONTIN     HYDROcodone-acetaminophen  mg per  "tablet  Commonly known as:  NORCO  Take 1 tablet by mouth every 6 (six) hours as needed.     inhalation spacing device  Commonly known as:  BREATHERITE VALVED MDI CHAMBER  Use as directed for inhalation.     insulin  unit/mL injection  Commonly known as:  NovoLIN N NPH U-100 Insulin  Take 25 units subq with breakfast and 15 units at bedtime     lancets Misc  1 each by Misc.(Non-Drug; Combo Route) route 3 (three) times daily.     NovoLIN R Regular U-100 Insuln 100 unit/mL injection  Generic drug:  insulin regular  INJECT 6 UNITS WITH BREAKFAST AND 8 UNITS WITH DINNER, SUBCUTANEOUSLY 30 MIN BEFORE MEAL.     ondansetron 4 MG Tbdl  Commonly known as:  ZOFRAN-ODT  Take 1 tablet (4 mg total) by mouth every 8 (eight) hours as needed.     * pen needle, diabetic 32 gauge x 5/32" Ndle  Commonly known as:  EASY COMFORT PEN NEEDLES  Inject 1 each into the skin 3 (three) times daily.     * pen needle, diabetic 31 gauge x 1/4" Ndle  1 each by Misc.(Non-Drug; Combo Route) route 4 (four) times daily.     * BD ULTRA-FINE SHORT PEN NEEDLE 31 gauge x 5/16" Ndle  Generic drug:  pen needle, diabetic     predniSONE 5 MG tablet  Commonly known as:  DELTASONE  Take 1 tablet (5 mg total) by mouth once daily.     sodium bicarbonate 650 MG tablet  Take 4 tablets (2,600 mg total) by mouth 2 (two) times daily.     tacrolimus 0.5 MG Cap  Commonly known as:  PROGRAF  Take 3 capsules (1.5 mg total) by mouth every 12 (twelve) hours. Z94.0 Kidney Transplant     zolpidem 5 MG Tab  Commonly known as:  AMBIEN  Take 2 tablets (10 mg total) by mouth nightly as needed.         * This list has 3 medication(s) that are the same as other medications prescribed for you. Read the directions carefully, and ask your doctor or other care provider to review them with you.                Review of patient's allergies indicates:  No Known Allergies    Past Surgical History:   Procedure Laterality Date    AORTOGRAPHY WITH SERIALOGRAPHY N/A 6/14/2018    " Procedure: LEFT LEG ANGIOGRAM;  Surgeon: Donal Mcdonald MD;  Location: [facility] CATH LAB;  Service: Vascular;  Laterality: N/A;    av bovine graft      Left UE    AV FISTULA PLACEMENT      left UE    BIOPSY Tranplanted Kidney N/A 8/15/2016    Per[facility] at [facility] OR [address] [place] [address]    BIOPSY, LIVER, TRANSJUGULAR APPROACH N/A 2014    Performed by [facility] Diagnostic Provider at [facility] CATH LAB    CARDIAC CATHETERIZATION  2015    INSERTION, CATHETER, VASCULAR N/A 10/31/2013    Performed by Ralph Mckeon MD at [facility] CATH LAB    IRRIGATION AND DEBRIDEMENT Left 2018    Performed by Katerina Magaña DPM at [facility] OR    KIDNEY TRANSPLANT  2016    LEFT LEG ANGIOGRAM N/A 2018    Performed by Donal Mcdonald MD at [facility] CATH LAB    LEG AMPUTATION THROUGH KNEE      right LE, started as nail puncture leading to diabetic ulcer    TRANSPLANT-KIDNEY Right 2016    [facility]       Family History   Problem Relation Age of Onset    Cancer Father     Diabetes Father     Heart failure Father     Stroke Father     Heart failure Mother     Kidney disease Neg Hx        Social History     Socioeconomic History    Marital status: Single     Spouse name: Not on file    Number of children: Not on file    Years of education: Not on file    Highest education level: Not on file   Social Needs    Financial resource strain: Not on file    Food insecurity - worry: Not on file    Food insecurity - inability: Not on file    Transportation needs - medical: Not on file    Transportation needs - non-medical: Not on file   Occupational History    Occupation: Disabled     Employer: DISABLED   Tobacco Use    Smoking status: Former Smoker     Packs/day: 1.00     Years: 40.00     Pack years: 40.00     Last attempt to quit: 2013     Years since quittin.6    Smokeless tobacco: Never Used   Substance and Sexual Activity    Alcohol use: Yes     Alcohol/week:  "3.6 oz     Types: 6 Cans of beer per week     Comment: 6 beers/week    Drug use: No    Sexual activity: No   Other Topics Concern    Not on file   Social History Narrative    Lives alone. Retired from construction and various jobs, now retired. Would like to return to work PT to alleviate boredom.       Vitals:    08/31/18 0913 08/31/18 0915   BP:  (!) 167/80   Pulse:  82   Weight:  104.1 kg (229 lb 8 oz)   Height: 5' 11" (1.803 m) 5' 11" (1.803 m)   PainSc: 0-No pain   5       Hemoglobin A1C   Date Value Ref Range Status   07/07/2018 10.2 (H) 4.0 - 5.6 % Final     Comment:     ADA Screening Guidelines:  5.7-6.4%  Consistent with prediabetes  >or=6.5%  Consistent with diabetes  High levels of fetal hemoglobin interfere with the HbA1C  assay. Heterozygous hemoglobin variants (HbS, HgC, etc)do  not significantly interfere with this assay.   However, presence of multiple variants may affect accuracy.     06/12/2018 9.9 (H) 4.0 - 5.6 % Final     Comment:     ADA Screening Guidelines:  5.7-6.4%  Consistent with prediabetes  >or=6.5%  Consistent with diabetes  High levels of fetal hemoglobin interfere with the HbA1C  assay. Heterozygous hemoglobin variants (HbS, HgC, etc)do  not significantly interfere with this assay.   However, presence of multiple variants may affect accuracy.     11/03/2017 7.1 (H) 4.0 - 5.6 % Final     Comment:     According to ADA guidelines, hemoglobin A1c <7.0% represents  optimal control in non-pregnant diabetic patients. Different  metrics may apply to specific patient populations.   Standards of Medical Care in Diabetes-2016.  For the purpose of screening for the presence of diabetes:  <5.7%     Consistent with the absence of diabetes  5.7-6.4%  Consistent with increasing risk for diabetes   (prediabetes)  >or=6.5%  Consistent with diabetes  Currently, no consensus exists for use of hemoglobin A1c  for diagnosis of diabetes for children.  This Hemoglobin A1c assay has significant " interference with fetal   hemoglobin   (HbF). The results are invalid for patients with abnormal amounts of   HbF,   including those with known Hereditary Persistence   of Fetal Hemoglobin. Heterozygous hemoglobin variants (HbAS, HbAC,   HbAD, HbAE, HbA2) do not significantly interfere with this assay;   however, presence of multiple variants in a sample may impact the %   interference.         Review of Systems   Constitutional: Negative for chills and fever.   Respiratory: Negative for shortness of breath.    Cardiovascular: Positive for claudication. Negative for chest pain, palpitations, orthopnea and leg swelling.   Gastrointestinal: Negative for diarrhea, nausea and vomiting.   Musculoskeletal: Negative for joint pain.   Skin: Negative for rash.   Neurological: Positive for sensory change. Negative for dizziness, tingling, focal weakness and weakness.   Psychiatric/Behavioral: Negative.              Objective:      Physical examination: General: Patient is in no acute distress, alert and oriented x 3.  Dressing to left foot clean, dry, and intact.   Lower Extremity Exam:  Vascular: Dorsalis pedis and Posterior tibial pulses faint on left foot.  Capillary fill time blanchabe  to toes on left foot. Toes are cold. No edema noted on left foot.   Dermatologic: Left hallux dorsal ulcer noted with fibrotic base. Left distal hallux ulcer noted with eschar base. Exposed bone noted. Positive decreased erythema toe left toe 1,3,4,5. Dry eschar noted to left 4th toe.   Neurological: Light touch sensation intact to left foot.   Musculoskeletal: Negative pain on palpation/ROM of left foot. Right BKA noted.     TEST RESULTS: Pathology bone culture results reveals acute osteomyelitis and aerobic cultures reveals                           Assessment:       Encounter Diagnoses   Name Primary?    Acute osteomyelitis of toe of left foot Yes    Toe ulcer, left, with necrosis of bone     PVD (peripheral vascular disease)      Ischemic toe ulcer, left, limited to breakdown of skin          Plan:   Acute osteomyelitis of toe of left foot    Toe ulcer, left, with necrosis of bone    PVD (peripheral vascular disease)    Ischemic toe ulcer, left, limited to breakdown of skin      I counseled the patient on his conditions, regarding findings of my examination, my impressions, and usual treatment plan.   Short-term goals include maintaining good offloading and minimizing bioburden, promoting granulation and epithelialization to healing.  Long-term goals include keeping the wound healed by good offloading and medical management under the direction of internist.  Wound was irrigated with sterile saline and cleansed with wound cleanse cloth. The patient tolerated this well. Wound was then dressed with Santyl, gauze, and papertape. Patient was given instructions on daily dressing changes. Patient was also instructed on the importance of keeping the wound and dressings clean dry and intact and keeping pressure off the wound area until complete healing of the wound. Home wound care instructions ar provided.  Patient to continue home health and IV antibiotics.   Contact Dr. Mcdonald (vascular) for future apportionment for re-evaluation.   Follow-up:Patient is to return to the clinic in 3 weeks  for follow-up but should call [facility] immediately if any signs of infection, such as fever, chills, sweats, increased redness or pain.                    Sharnette Miles, DPM [facility]

## 2018-09-14 ENCOUNTER — TELEPHONE (OUTPATIENT)
Dept: PODIATRY | Facility: CLINIC | Age: 63
End: 2018-09-14

## 2018-09-14 NOTE — TELEPHONE ENCOUNTER
Rescheduled pt for 9/17/2018  ----- Message from Allsion Coleman sent at 9/14/2018  8:34 AM CDT -----  Contact: Kecia/sister  Kecia called and cancelled this patient's appointment today because he fell. The next available is in Oct but she wants him to be worked in next week because he is coming in for the doctor to check on his foot that he has an infection in (he is on antibiotics).    She can be contacted at [phone] or [phone].    Thanks,  Allison

## 2018-09-18 ENCOUNTER — HOSPITAL ENCOUNTER (INPATIENT)
Facility: HOSPITAL | Age: 63
LOS: 8 days | Discharge: HOME HEALTH | DRG: 853 | End: 2018-09-26
Attending: EMERGENCY MEDICINE | Admitting: HOSPITALIST
Payer: MEDICARE

## 2018-09-18 DIAGNOSIS — I96 GANGRENE: ICD-10-CM

## 2018-09-18 DIAGNOSIS — Z79.4 TYPE 2 DIABETES MELLITUS WITH HYPERGLYCEMIA, WITH LONG-TERM CURRENT USE OF INSULIN: ICD-10-CM

## 2018-09-18 DIAGNOSIS — A41.9 SEPSIS: ICD-10-CM

## 2018-09-18 DIAGNOSIS — E86.0 DEHYDRATION: Primary | ICD-10-CM

## 2018-09-18 DIAGNOSIS — I96 GANGRENE OF TOE OF LEFT FOOT: ICD-10-CM

## 2018-09-18 DIAGNOSIS — E11.311 TYPE 2 DIABETES MELLITUS WITH RIGHT EYE AFFECTED BY RETINOPATHY AND MACULAR EDEMA, WITH LONG-TERM CURRENT USE OF INSULIN, UNSPECIFIED RETINOPATHY SEVERITY: ICD-10-CM

## 2018-09-18 DIAGNOSIS — R73.9 HYPERGLYCEMIA: ICD-10-CM

## 2018-09-18 DIAGNOSIS — R62.7 FAILURE TO THRIVE IN ADULT: ICD-10-CM

## 2018-09-18 DIAGNOSIS — R78.81 BACTEREMIA: ICD-10-CM

## 2018-09-18 DIAGNOSIS — I96 GANGRENE OF LEFT FOOT: ICD-10-CM

## 2018-09-18 DIAGNOSIS — Z79.4 TYPE 2 DIABETES MELLITUS WITH RIGHT EYE AFFECTED BY RETINOPATHY AND MACULAR EDEMA, WITH LONG-TERM CURRENT USE OF INSULIN, UNSPECIFIED RETINOPATHY SEVERITY: ICD-10-CM

## 2018-09-18 DIAGNOSIS — A48.0 GAS GANGRENE OF FOOT: ICD-10-CM

## 2018-09-18 DIAGNOSIS — I73.9 PVD (PERIPHERAL VASCULAR DISEASE): Chronic | ICD-10-CM

## 2018-09-18 DIAGNOSIS — E11.65 TYPE 2 DIABETES MELLITUS WITH HYPERGLYCEMIA, WITH LONG-TERM CURRENT USE OF INSULIN: ICD-10-CM

## 2018-09-18 DIAGNOSIS — R10.9 FLANK PAIN: ICD-10-CM

## 2018-09-18 LAB
ALBUMIN SERPL BCP-MCNC: 2.5 G/DL
ALP SERPL-CCNC: 95 U/L
ALT SERPL W/O P-5'-P-CCNC: 13 U/L
AMORPH CRY URNS QL MICRO: ABNORMAL
ANION GAP SERPL CALC-SCNC: 18 MMOL/L
AST SERPL-CCNC: 19 U/L
BACTERIA #/AREA URNS HPF: ABNORMAL /HPF
BASOPHILS # BLD AUTO: 0.01 K/UL
BASOPHILS NFR BLD: 0.1 %
BILIRUB SERPL-MCNC: 0.9 MG/DL
BILIRUB UR QL STRIP: ABNORMAL
BNP SERPL-MCNC: 218 PG/ML
BUN SERPL-MCNC: 28 MG/DL
CALCIUM SERPL-MCNC: 9.2 MG/DL
CHLORIDE SERPL-SCNC: 96 MMOL/L
CK MB SERPL-MCNC: 1 NG/ML
CK MB SERPL-RTO: 1 %
CK SERPL-CCNC: 98 U/L
CK SERPL-CCNC: 98 U/L
CLARITY UR: CLEAR
CO2 SERPL-SCNC: 13 MMOL/L
COLOR UR: YELLOW
CREAT SERPL-MCNC: 1.9 MG/DL
DIFFERENTIAL METHOD: ABNORMAL
EOSINOPHIL # BLD AUTO: 0 K/UL
EOSINOPHIL NFR BLD: 0 %
ERYTHROCYTE [DISTWIDTH] IN BLOOD BY AUTOMATED COUNT: 13.5 %
EST. GFR  (AFRICAN AMERICAN): 42 ML/MIN/1.73 M^2
EST. GFR  (NON AFRICAN AMERICAN): 36 ML/MIN/1.73 M^2
GLUCOSE SERPL-MCNC: 329 MG/DL
GLUCOSE UR QL STRIP: ABNORMAL
HCT VFR BLD AUTO: 40.6 %
HGB BLD-MCNC: 13.4 G/DL
HGB UR QL STRIP: ABNORMAL
HYALINE CASTS #/AREA URNS LPF: 0 /LPF
KETONES UR QL STRIP: ABNORMAL
LACTATE SERPL-SCNC: 1.2 MMOL/L
LEUKOCYTE ESTERASE UR QL STRIP: NEGATIVE
LYMPHOCYTES # BLD AUTO: 0.8 K/UL
LYMPHOCYTES NFR BLD: 5 %
MAGNESIUM SERPL-MCNC: 1.9 MG/DL
MCH RBC QN AUTO: 29.1 PG
MCHC RBC AUTO-ENTMCNC: 33 G/DL
MCV RBC AUTO: 88 FL
MICROSCOPIC COMMENT: ABNORMAL
MONOCYTES # BLD AUTO: 1.8 K/UL
MONOCYTES NFR BLD: 10.9 %
NEUTROPHILS # BLD AUTO: 14.1 K/UL
NEUTROPHILS NFR BLD: 84 %
NITRITE UR QL STRIP: NEGATIVE
PH UR STRIP: 6 [PH] (ref 5–8)
PHOSPHATE SERPL-MCNC: 2.1 MG/DL
PLATELET # BLD AUTO: 263 K/UL
PMV BLD AUTO: 9.8 FL
POCT GLUCOSE: 282 MG/DL (ref 70–110)
POTASSIUM SERPL-SCNC: 4.6 MMOL/L
PROT SERPL-MCNC: 7.1 G/DL
PROT UR QL STRIP: ABNORMAL
RBC # BLD AUTO: 4.61 M/UL
RBC #/AREA URNS HPF: 0 /HPF (ref 0–4)
SODIUM SERPL-SCNC: 127 MMOL/L
SP GR UR STRIP: >=1.03 (ref 1–1.03)
T4 FREE SERPL-MCNC: 1.15 NG/DL
TROPONIN I SERPL DL<=0.01 NG/ML-MCNC: 0.04 NG/ML
TSH SERPL DL<=0.005 MIU/L-ACNC: 0.39 UIU/ML
URN SPEC COLLECT METH UR: ABNORMAL
UROBILINOGEN UR STRIP-ACNC: NEGATIVE EU/DL
WBC # BLD AUTO: 16.72 K/UL
WBC #/AREA URNS HPF: 0 /HPF (ref 0–5)

## 2018-09-18 PROCEDURE — 84439 ASSAY OF FREE THYROXINE: CPT

## 2018-09-18 PROCEDURE — 51701 INSERT BLADDER CATHETER: CPT

## 2018-09-18 PROCEDURE — 96367 TX/PROPH/DG ADDL SEQ IV INF: CPT

## 2018-09-18 PROCEDURE — 63600175 PHARM REV CODE 636 W HCPCS: Performed by: EMERGENCY MEDICINE

## 2018-09-18 PROCEDURE — 25000003 PHARM REV CODE 250: Performed by: EMERGENCY MEDICINE

## 2018-09-18 PROCEDURE — 93005 ELECTROCARDIOGRAM TRACING: CPT

## 2018-09-18 PROCEDURE — 87186 SC STD MICRODIL/AGAR DIL: CPT

## 2018-09-18 PROCEDURE — 63600175 PHARM REV CODE 636 W HCPCS: Performed by: NURSE PRACTITIONER

## 2018-09-18 PROCEDURE — 85025 COMPLETE CBC W/AUTO DIFF WBC: CPT

## 2018-09-18 PROCEDURE — 83735 ASSAY OF MAGNESIUM: CPT

## 2018-09-18 PROCEDURE — 84443 ASSAY THYROID STIM HORMONE: CPT

## 2018-09-18 PROCEDURE — 87077 CULTURE AEROBIC IDENTIFY: CPT

## 2018-09-18 PROCEDURE — 82550 ASSAY OF CK (CPK): CPT

## 2018-09-18 PROCEDURE — 81000 URINALYSIS NONAUTO W/SCOPE: CPT

## 2018-09-18 PROCEDURE — 96361 HYDRATE IV INFUSION ADD-ON: CPT

## 2018-09-18 PROCEDURE — 84484 ASSAY OF TROPONIN QUANT: CPT

## 2018-09-18 PROCEDURE — 25000242 PHARM REV CODE 250 ALT 637 W/ HCPCS: Performed by: NURSE PRACTITIONER

## 2018-09-18 PROCEDURE — 82962 GLUCOSE BLOOD TEST: CPT

## 2018-09-18 PROCEDURE — 82553 CREATINE MB FRACTION: CPT

## 2018-09-18 PROCEDURE — 96375 TX/PRO/DX INJ NEW DRUG ADDON: CPT

## 2018-09-18 PROCEDURE — 21400001 HC TELEMETRY ROOM

## 2018-09-18 PROCEDURE — 83880 ASSAY OF NATRIURETIC PEPTIDE: CPT

## 2018-09-18 PROCEDURE — 87040 BLOOD CULTURE FOR BACTERIA: CPT | Mod: 59

## 2018-09-18 PROCEDURE — 84100 ASSAY OF PHOSPHORUS: CPT

## 2018-09-18 PROCEDURE — 96372 THER/PROPH/DIAG INJ SC/IM: CPT | Mod: 59

## 2018-09-18 PROCEDURE — 94640 AIRWAY INHALATION TREATMENT: CPT

## 2018-09-18 PROCEDURE — 96365 THER/PROPH/DIAG IV INF INIT: CPT

## 2018-09-18 PROCEDURE — 83605 ASSAY OF LACTIC ACID: CPT

## 2018-09-18 PROCEDURE — 99285 EMERGENCY DEPT VISIT HI MDM: CPT | Mod: 25

## 2018-09-18 PROCEDURE — 80053 COMPREHEN METABOLIC PANEL: CPT

## 2018-09-18 PROCEDURE — 25000003 PHARM REV CODE 250: Performed by: NURSE PRACTITIONER

## 2018-09-18 PROCEDURE — 93010 ELECTROCARDIOGRAM REPORT: CPT | Mod: ,,, | Performed by: INTERNAL MEDICINE

## 2018-09-18 RX ORDER — SODIUM CHLORIDE 0.9 % (FLUSH) 0.9 %
5 SYRINGE (ML) INJECTION
Status: DISCONTINUED | OUTPATIENT
Start: 2018-09-18 | End: 2018-09-26 | Stop reason: HOSPADM

## 2018-09-18 RX ORDER — IBUPROFEN 200 MG
16 TABLET ORAL
Status: DISCONTINUED | OUTPATIENT
Start: 2018-09-18 | End: 2018-09-26 | Stop reason: HOSPADM

## 2018-09-18 RX ORDER — ASPIRIN 81 MG/1
81 TABLET ORAL DAILY
Status: DISCONTINUED | OUTPATIENT
Start: 2018-09-19 | End: 2018-09-26 | Stop reason: HOSPADM

## 2018-09-18 RX ORDER — MORPHINE SULFATE 4 MG/ML
2 INJECTION, SOLUTION INTRAMUSCULAR; INTRAVENOUS ONCE
Status: COMPLETED | OUTPATIENT
Start: 2018-09-18 | End: 2018-09-18

## 2018-09-18 RX ORDER — PREDNISONE 5 MG/1
5 TABLET ORAL DAILY
Status: DISCONTINUED | OUTPATIENT
Start: 2018-09-19 | End: 2018-09-26 | Stop reason: HOSPADM

## 2018-09-18 RX ORDER — SODIUM CHLORIDE 9 MG/ML
1000 INJECTION, SOLUTION INTRAVENOUS
Status: COMPLETED | OUTPATIENT
Start: 2018-09-18 | End: 2018-09-18

## 2018-09-18 RX ORDER — AMLODIPINE BESYLATE 2.5 MG/1
2.5 TABLET ORAL DAILY
Status: DISCONTINUED | OUTPATIENT
Start: 2018-09-19 | End: 2018-09-22

## 2018-09-18 RX ORDER — GABAPENTIN 100 MG/1
100 CAPSULE ORAL 3 TIMES DAILY
Status: DISCONTINUED | OUTPATIENT
Start: 2018-09-18 | End: 2018-09-26 | Stop reason: HOSPADM

## 2018-09-18 RX ORDER — ONDANSETRON 2 MG/ML
4 INJECTION INTRAMUSCULAR; INTRAVENOUS EVERY 8 HOURS PRN
Status: DISCONTINUED | OUTPATIENT
Start: 2018-09-18 | End: 2018-09-26 | Stop reason: HOSPADM

## 2018-09-18 RX ORDER — BUDESONIDE 0.5 MG/2ML
0.5 INHALANT ORAL EVERY 12 HOURS
Status: DISCONTINUED | OUTPATIENT
Start: 2018-09-18 | End: 2018-09-26 | Stop reason: HOSPADM

## 2018-09-18 RX ORDER — IBUPROFEN 200 MG
24 TABLET ORAL
Status: DISCONTINUED | OUTPATIENT
Start: 2018-09-18 | End: 2018-09-26 | Stop reason: HOSPADM

## 2018-09-18 RX ORDER — ARFORMOTEROL TARTRATE 15 UG/2ML
15 SOLUTION RESPIRATORY (INHALATION) EVERY 12 HOURS
Status: DISCONTINUED | OUTPATIENT
Start: 2018-09-18 | End: 2018-09-26 | Stop reason: HOSPADM

## 2018-09-18 RX ORDER — HEPARIN SODIUM 5000 [USP'U]/ML
5000 INJECTION, SOLUTION INTRAVENOUS; SUBCUTANEOUS EVERY 8 HOURS
Status: DISCONTINUED | OUTPATIENT
Start: 2018-09-18 | End: 2018-09-19

## 2018-09-18 RX ORDER — GLUCAGON 1 MG
1 KIT INJECTION
Status: DISCONTINUED | OUTPATIENT
Start: 2018-09-18 | End: 2018-09-26 | Stop reason: HOSPADM

## 2018-09-18 RX ORDER — ACETAMINOPHEN 325 MG/1
650 TABLET ORAL EVERY 4 HOURS PRN
Status: DISCONTINUED | OUTPATIENT
Start: 2018-09-18 | End: 2018-09-26 | Stop reason: HOSPADM

## 2018-09-18 RX ORDER — SODIUM BICARBONATE 650 MG/1
2600 TABLET ORAL 2 TIMES DAILY
Status: DISCONTINUED | OUTPATIENT
Start: 2018-09-18 | End: 2018-09-26 | Stop reason: HOSPADM

## 2018-09-18 RX ORDER — MORPHINE SULFATE 4 MG/ML
4 INJECTION, SOLUTION INTRAMUSCULAR; INTRAVENOUS
Status: COMPLETED | OUTPATIENT
Start: 2018-09-18 | End: 2018-09-18

## 2018-09-18 RX ORDER — TRAMADOL HYDROCHLORIDE 50 MG/1
50 TABLET ORAL EVERY 8 HOURS PRN
Status: DISCONTINUED | OUTPATIENT
Start: 2018-09-18 | End: 2018-09-20

## 2018-09-18 RX ORDER — ONDANSETRON 2 MG/ML
4 INJECTION INTRAMUSCULAR; INTRAVENOUS
Status: COMPLETED | OUTPATIENT
Start: 2018-09-18 | End: 2018-09-18

## 2018-09-18 RX ORDER — INSULIN ASPART 100 [IU]/ML
0-5 INJECTION, SOLUTION INTRAVENOUS; SUBCUTANEOUS
Status: DISCONTINUED | OUTPATIENT
Start: 2018-09-18 | End: 2018-09-26 | Stop reason: HOSPADM

## 2018-09-18 RX ADMIN — MORPHINE SULFATE 4 MG: 4 INJECTION INTRAVENOUS at 04:09

## 2018-09-18 RX ADMIN — TACROLIMUS 1.5 MG: 1 CAPSULE ORAL at 09:09

## 2018-09-18 RX ADMIN — PIPERACILLIN SODIUM AND TAZOBACTAM SODIUM 4.5 G: 4; .5 INJECTION, POWDER, LYOPHILIZED, FOR SOLUTION INTRAVENOUS at 06:09

## 2018-09-18 RX ADMIN — INSULIN ASPART 1 UNITS: 100 INJECTION, SOLUTION INTRAVENOUS; SUBCUTANEOUS at 10:09

## 2018-09-18 RX ADMIN — SODIUM CHLORIDE 1000 ML: 0.9 INJECTION, SOLUTION INTRAVENOUS at 04:09

## 2018-09-18 RX ADMIN — GABAPENTIN 100 MG: 100 CAPSULE ORAL at 08:09

## 2018-09-18 RX ADMIN — ONDANSETRON 4 MG: 2 INJECTION, SOLUTION INTRAMUSCULAR; INTRAVENOUS at 04:09

## 2018-09-18 RX ADMIN — VANCOMYCIN HYDROCHLORIDE 1250 MG: 10 INJECTION, POWDER, LYOPHILIZED, FOR SOLUTION INTRAVENOUS at 07:09

## 2018-09-18 RX ADMIN — SODIUM BICARBONATE 650 MG TABLET 2600 MG: at 09:09

## 2018-09-18 RX ADMIN — ARFORMOTEROL TARTRATE 15 MCG: 15 SOLUTION RESPIRATORY (INHALATION) at 09:09

## 2018-09-18 RX ADMIN — MORPHINE SULFATE 2 MG: 4 INJECTION INTRAVENOUS at 09:09

## 2018-09-18 RX ADMIN — BUDESONIDE 0.5 MG: 0.5 SUSPENSION RESPIRATORY (INHALATION) at 09:09

## 2018-09-18 RX ADMIN — SODIUM CHLORIDE 1000 ML: 0.9 INJECTION, SOLUTION INTRAVENOUS at 03:09

## 2018-09-18 RX ADMIN — HEPARIN SODIUM 5000 UNITS: 5000 INJECTION, SOLUTION INTRAVENOUS; SUBCUTANEOUS at 09:09

## 2018-09-18 NOTE — ED NOTES
Patient presents with altered mental status.     Level of Consciousness: The patient is awake, lethargic, and oriented to person, place and time.  Appearance: Sitting up in bed, malodorous. Feces present on multiple parts of patients body including hands, groin, legs.   Skin: Skin is warm and dry with good skin turgor; intact; color consistent with ethnicity.  Mucous membranes are dry. amputation to right lower extremity above knee. Left greater toe wound. Third left middle toe is black. Erythema present to left foot.   Musculoskeletal: Moves all extremities well in full range of motion. No obvious deformities or swelling noted. PT complaint of back pain.   Respiratory: Airway open and patent, respirations spontaneous, even and unlabored. No accessory muscles in use. Breath sounds clear.  Cardiac: Regular rate and rhythm, no peripheral edema noted, good pulses palpated peripherally, capillary refill < 3 seconds. Blood pressure elevated.   Abdomen: Soft, non-tender to palpation. No distention noted.complaint of nausea.   Neurologic: PERRLA, face exhibits symmetrical expression, hand grasps equal and even bilaterally.  Psychosocial: patient unable to sit still.     Patient verbalized understanding of status and plan of care.Side rails up x 2, call light in reach, bed low and locked. Cardiac monitor applied to patient; alarms on, audible, and set. Will continue to monitor.

## 2018-09-18 NOTE — ED PROVIDER NOTES
SCRIBE #1 NOTE: I, Ambar Grissom, am scribing for, and in the presence of, Jn Lopez MD. I have scribed the HPI, ROS, PEx.    SCRIBE #2 NOTE: I, Herbert Kyle, am scribing for, and in the presence of,  Jackie Oleary MD. I have scribed the remaining portions of the note not scribed by Scribe #1.     History      Chief Complaint   Patient presents with    Failure To Thrive     pts room was covered in human and dog feces, pt refusing to take his medications, diabetic, glucose 348 by EMS       Review of patient's allergies indicates:  No Known Allergies     HPI   HPI    2018, 2:31 PM   History obtained from the EMS and patient      History of Present Illness: Lavelle Ladd is a 63 y.o. male kidney transplant patient with hx of HTN, CAD, diastolic heart failure, Type 2 DM with diabetic neuropathy, ESRD, PVD, COPD who was brought to ED via EMS secondary to failure to thrive which has been present for an unknown amount of time. EMS reports that there was human and dog feces in patient's room and that patient does not want to take his medications. His CBG was 348. Patient is currently c/o of R flank pain and is requesting pain medication. Symptoms are constant and moderate in severity. No mitigating or exacerbating factors reported. No c/o fever, chills, N/V, abdominal pain, diarrhea, constipation, dysuria, hematuria, and all other sxs at this time. No further complaints or concerns at this time.       Arrival mode: Personal vehicle    PCP: Rishabh Esteban MD       Past Medical History:  Past Medical History:   Diagnosis Date    DINORAH (acute kidney injury) 2016    Arthritis     Chronic obstructive pulmonary disease 2016    Coronary artery disease involving native coronary artery of native heart without angina pectoris 2016     donor kidney transplant for DM 16     Induction with Thymo x3 and IV solumedrol to total 875mg  Kidney Biopsy  2016: 16 glomeruli, ACR  type 1 AVR type 2, significant microcirculatory changes, c4d negative, No DSA, 5 to10% fibrosis. Treated with thymo x8 6/21/2016- no rejection      Diastolic heart failure     Encounter for blood transfusion     ESRD on RRT since 10/2013 10/29/2013    Biopsy proven diabetic nephropathy and lymphoplasmacytic interstitial infiltrate not c/w with AIN (ddx sjogrens or assoc with tamm-horsefall protein extravasation)     GERD (gastroesophageal reflux disease)     History of hepatitis C, s/p successful Rx w/ SVR12 - 4/2017 4/5/2017    Completed 12 weeks harvoni w/ SVR    Hyperlipidemia     Hypertension     PIC line (peripherally inserted central catheter) flush     Prophylactic immunotherapy     Proteinuria     PVD (peripheral vascular disease) 6/26/2017    RLE BKA CT 12/11/16 Extensive atherosclerotic disease of the aorta and mesenteric arteries.     Renal hypertension     Type 2 diabetes mellitus with diabetic neuropathy, with long-term current use of insulin 12/1/2016    Vitamin B12 deficiency        Past Surgical History:  Past Surgical History:   Procedure Laterality Date    AORTOGRAPHY WITH SERIALOGRAPHY N/A 6/14/2018    Procedure: LEFT LEG ANGIOGRAM;  Surgeon: Donal Mcdonald MD;  Location: [facility] CATH LAB;  Service: Vascular;  Laterality: N/A;    av bovine graft      Left UE    AV FISTULA PLACEMENT      left UE    BIOPSY Tranplanted Kidney N/A 8/15/2016    Performed by Paulette Hanna MD at [facility] OR 2ND FLR    BIOPSY, LIVER, TRANSJUGULAR APPROACH N/A 4/24/2014    Performed by [facility] Diagnostic Provider at [facility] CATH LAB    CARDIAC CATHETERIZATION  02/2015    INSERTION, CATHETER, VASCULAR N/A 10/31/2013    Performed by Ralph Mckeon MD at [facility] CATH LAB    IRRIGATION AND DEBRIDEMENT Left 7/9/2018    Performed by Katerina Magaña DPM at [facility] OR    KIDNEY TRANSPLANT  05/21/2016    LEFT LEG ANGIOGRAM N/A 6/14/2018    Performed by Donal Mcdonald MD at [facility] CATH LAB    LEG AMPUTATION THROUGH KNEE  2011     right LE, started as nail puncture leading to diabetic ulcer    TRANSPLANT-KIDNEY Right 2016    Performed by Ronny Bergeron MD at [facility] OR [address] [place] [address]         Family History:  Family History   Problem Relation Age of Onset    Cancer Father     Diabetes Father     Heart failure Father     Stroke Father     Heart failure Mother     Kidney disease Neg Hx        Social History:  Social History     Tobacco Use    Smoking status: Former Smoker     Packs/day: 1.00     Years: 40.00     Pack years: 40.00     Last attempt to quit: 2013     Years since quittin.6    Smokeless tobacco: Never Used   Substance and Sexual Activity    Alcohol use: Yes     Alcohol/week: 3.6 oz     Types: 6 Cans of beer per week     Comment: 6 beers/week    Drug use: No    Sexual activity: No       ROS   Review of Systems   Constitutional: Negative for chills and fever.        (+) failure to thrive   HENT: Negative for sore throat.    Respiratory: Negative for shortness of breath.    Cardiovascular: Negative for chest pain.   Gastrointestinal: Negative for abdominal pain, constipation, diarrhea, nausea and vomiting.   Genitourinary: Positive for flank pain. Negative for dysuria and hematuria.   Musculoskeletal: Negative for back pain.   Skin: Negative for rash.   Neurological: Negative for weakness.   Hematological: Does not bruise/bleed easily.   All other systems reviewed and are negative.      Physical Exam      Initial Vitals [18 1427]   BP Pulse Resp Temp SpO2   (!) 152/80 110 16 98.3 °F (36.8 °C) 98 %      MAP       --          Physical Exam  Nursing Notes and Vital Signs Reviewed.  Constitutional: Patient is in no acute distress. Awake and alert. Well-developed and well-nourished.  Head: Atraumatic. Normocephalic.  Eyes: PERRL. EOM intact. Conjunctivae are not pale. No scleral icterus.  ENT: Mucous membranes are dry. Oropharynx is clear and symmetric.    Neck: Supple. Full ROM. No lymphadenopathy.  Cardiovascular:  "Regular rate. Regular rhythm. No murmurs, rubs, or gallops. Distal pulses are 2+ and symmetric.  Pulmonary/Chest: No respiratory distress. Clear to auscultation bilaterally. No wheezing, rales, or rhonchi.  Abdominal: Soft and non-distended.  There is no tenderness.  No rebound, guarding, or rigidity.  Good bowel sounds.    : No CVA TTP.  Musculoskeletal: R BKA. Moves all extremities. No calf tenderness.  Skin: Blackened, gangrenous toes on the left foot. There is a wound to the great toe.  Neurological: GCS 15. Alert, awake, and appropriate.  Normal speech.  No acute focal neurological deficits are appreciated.  Psychiatric: Normal affect. Good eye contact. Appropriate in content.    ED Course    Procedures  ED Vital Signs:  Vitals:    09/18/18 2100 09/18/18 2104 09/18/18 2159 09/18/18 2207   BP:    (!) 165/84   Pulse: 109 100 106 104   Resp: 18 20 17 20   Temp:    98.8 °F (37.1 °C)   TempSrc:    Oral   SpO2: 100% 100% 100% 100%   Weight:       Height:        09/18/18 2231 09/18/18 2309 09/19/18 0416 09/19/18 0557   BP: (!) 152/81  (!) 174/93    Pulse: 104 106 108 105   Resp: 18      Temp: 100 °F (37.8 °C)  98.6 °F (37 °C)    TempSrc: Oral  Oral    SpO2: (!) 92%  (!) 92%    Weight:       Height:        09/19/18 0710 09/19/18 0713 09/19/18 1205 09/19/18 1517   BP:  131/85 (!) 152/67 (!) 170/63   Pulse: 107 (!) 111 100 97   Resp: 18 14 18 18   Temp:  96.9 °F (36.1 °C)  99.7 °F (37.6 °C)   TempSrc:  Oral Oral Oral   SpO2: (!) 93% (!) 93% (!) 93% (!) 93%   Weight:       Height:        09/19/18 1520 09/19/18 1526 09/19/18 2058   BP:   (!) 181/77   Pulse:   92   Resp:   18   Temp:   97.1 °F (36.2 °C)   TempSrc:   Oral   SpO2:   (!) 94%   Weight: 93.1 kg (205 lb 3.2 oz)     Height: 5' 11" (1.803 m) 5' 11" (1.803 m)        Abnormal Lab Results:  Labs Reviewed   CBC W/ AUTO DIFFERENTIAL - Abnormal; Notable for the following components:       Result Value    WBC 16.72 (*)     Hemoglobin 13.4 (*)     Gran # (ANC) 14.1 (*)  "    Lymph # 0.8 (*)     Mono # 1.8 (*)     Gran% 84.0 (*)     Lymph% 5.0 (*)     All other components within normal limits   URINALYSIS - Abnormal; Notable for the following components:    Specific Gravity, UA >=1.030 (*)     Protein, UA 3+ (*)     Glucose, UA 2+ (*)     Ketones, UA 2+ (*)     Bilirubin (UA) 2+ (*)     Occult Blood UA 3+ (*)     All other components within normal limits   TSH - Abnormal; Notable for the following components:    TSH 0.387 (*)     All other components within normal limits   TROPONIN I - Abnormal; Notable for the following components:    Troponin I 0.036 (*)     All other components within normal limits   B-TYPE NATRIURETIC PEPTIDE - Abnormal; Notable for the following components:     (*)     All other components within normal limits   COMPREHENSIVE METABOLIC PANEL - Abnormal; Notable for the following components:    Sodium 127 (*)     CO2 13 (*)     Glucose 329 (*)     BUN, Bld 28 (*)     Creatinine 1.9 (*)     Albumin 2.5 (*)     Anion Gap 18 (*)     eGFR if  42 (*)     eGFR if non  36 (*)     All other components within normal limits   URINALYSIS MICROSCOPIC - Abnormal; Notable for the following components:    Bacteria, UA Few (*)     Amorphous, UA Many (*)     All other components within normal limits   PHOSPHORUS - Abnormal; Notable for the following components:    Phosphorus 2.1 (*)     All other components within normal limits   POCT GLUCOSE - Abnormal; Notable for the following components:    POCT Glucose 282 (*)     All other components within normal limits   CK   CK-MB   T4, FREE   LACTIC ACID, PLASMA   MAGNESIUM   PHOSPHORUS   MAGNESIUM   URINALYSIS   POCT GLUCOSE MONITORING CONTINUOUS        All Lab Results:  Results for orders placed or performed during the hospital encounter of 09/18/18   Blood Culture #1 **CANNOT BE ORDERED STAT**   Result Value Ref Range    Blood Culture, Routine       Gram stain susan bottle: Gram positive cocci in  clusters resembling Staph     Blood Culture, Routine       Results called to and read back by:NOA Eckert RN 09/19/2018  15:30    Blood Culture, Routine       Gram stain aer bottle: Gram positive cocci in clusters resembling Staph     Blood Culture, Routine 09/19/2018  15:56    Blood Culture #2 **CANNOT BE ORDERED STAT**   Result Value Ref Range    Blood Culture, Routine       Gram stain aer bottle: Gram positive cocci in clusters resembling Staph     Blood Culture, Routine       Gram stain susan bottle: Gram positive cocci in clusters resembling Staph     Blood Culture, Routine       Results called to and read back by:NOA Eckert RN 09/19/2018  15:31   CBC auto differential   Result Value Ref Range    WBC 16.72 (H) 3.90 - 12.70 K/uL    RBC 4.61 4.60 - 6.20 M/uL    Hemoglobin 13.4 (L) 14.0 - 18.0 g/dL    Hematocrit 40.6 40.0 - 54.0 %    MCV 88 82 - 98 fL    MCH 29.1 27.0 - 31.0 pg    MCHC 33.0 32.0 - 36.0 g/dL    RDW 13.5 11.5 - 14.5 %    Platelets 263 150 - 350 K/uL    MPV 9.8 9.2 - 12.9 fL    Gran # (ANC) 14.1 (H) 1.8 - 7.7 K/uL    Lymph # 0.8 (L) 1.0 - 4.8 K/uL    Mono # 1.8 (H) 0.3 - 1.0 K/uL    Eos # 0.0 0.0 - 0.5 K/uL    Baso # 0.01 0.00 - 0.20 K/uL    Gran% 84.0 (H) 38.0 - 73.0 %    Lymph% 5.0 (L) 18.0 - 48.0 %    Mono% 10.9 4.0 - 15.0 %    Eosinophil% 0.0 0.0 - 8.0 %    Basophil% 0.1 0.0 - 1.9 %    Differential Method Automated    Urinalysis   Result Value Ref Range    Specimen UA Urine, Catheterized     Color, UA Yellow Yellow, Straw, Jessa    Appearance, UA Clear Clear    pH, UA 6.0 5.0 - 8.0    Specific Gravity, UA >=1.030 (A) 1.005 - 1.030    Protein, UA 3+ (A) Negative    Glucose, UA 2+ (A) Negative    Ketones, UA 2+ (A) Negative    Bilirubin (UA) 2+ (A) Negative    Occult Blood UA 3+ (A) Negative    Nitrite, UA Negative Negative    Urobilinogen, UA Negative <2.0 EU/dL    Leukocytes, UA Negative Negative   TSH   Result Value Ref Range    TSH 0.387 (L) 0.400 - 4.000 uIU/mL   CK   Result Value Ref Range    CPK 98  20 - 200 U/L   CK-MB   Result Value Ref Range    CPK 98 20 - 200 U/L    CPK MB 1.0 0.1 - 6.5 ng/mL    MB% 1.0 0.0 - 5.0 %   Troponin I   Result Value Ref Range    Troponin I 0.036 (H) 0.000 - 0.026 ng/mL   Brain natriuretic peptide   Result Value Ref Range     (H) 0 - 99 pg/mL   Comprehensive metabolic panel   Result Value Ref Range    Sodium 127 (L) 136 - 145 mmol/L    Potassium 4.6 3.5 - 5.1 mmol/L    Chloride 96 95 - 110 mmol/L    CO2 13 (L) 23 - 29 mmol/L    Glucose 329 (H) 70 - 110 mg/dL    BUN, Bld 28 (H) 8 - 23 mg/dL    Creatinine 1.9 (H) 0.5 - 1.4 mg/dL    Calcium 9.2 8.7 - 10.5 mg/dL    Total Protein 7.1 6.0 - 8.4 g/dL    Albumin 2.5 (L) 3.5 - 5.2 g/dL    Total Bilirubin 0.9 0.1 - 1.0 mg/dL    Alkaline Phosphatase 95 55 - 135 U/L    AST 19 10 - 40 U/L    ALT 13 10 - 44 U/L    Anion Gap 18 (H) 8 - 16 mmol/L    eGFR if African American 42 (A) >60 mL/min/1.73 m^2    eGFR if non African American 36 (A) >60 mL/min/1.73 m^2   T4, free   Result Value Ref Range    Free T4 1.15 0.71 - 1.51 ng/dL   Urinalysis Microscopic   Result Value Ref Range    RBC, UA 0 0 - 4 /hpf    WBC, UA 0 0 - 5 /hpf    Bacteria, UA Few (A) None-Occ /hpf    Hyaline Casts, UA 0 0-1/lpf /lpf    Amorphous, UA Many (A) None-Moderate    Microscopic Comment SEE COMMENT    Lactic acid, plasma   Result Value Ref Range    Lactate (Lactic Acid) 1.2 0.5 - 2.2 mmol/L   Phosphorus   Result Value Ref Range    Phosphorus 2.1 (L) 2.7 - 4.5 mg/dL   Magnesium   Result Value Ref Range    Magnesium 1.9 1.6 - 2.6 mg/dL   TSH   Result Value Ref Range    TSH 0.238 (L) 0.400 - 4.000 uIU/mL   Basic Metabolic Panel (BMP)   Result Value Ref Range    Sodium 129 (L) 136 - 145 mmol/L    Potassium 4.2 3.5 - 5.1 mmol/L    Chloride 99 95 - 110 mmol/L    CO2 17 (L) 23 - 29 mmol/L    Glucose 204 (H) 70 - 110 mg/dL    BUN, Bld 24 (H) 8 - 23 mg/dL    Creatinine 1.8 (H) 0.5 - 1.4 mg/dL    Calcium 8.9 8.7 - 10.5 mg/dL    Anion Gap 13 8 - 16 mmol/L    eGFR if African  American 45 (A) >60 mL/min/1.73 m^2    eGFR if non African American 39 (A) >60 mL/min/1.73 m^2   Lipid panel   Result Value Ref Range    Cholesterol 149 120 - 199 mg/dL    Triglycerides 99 30 - 150 mg/dL    HDL 29 (L) 40 - 75 mg/dL    LDL Cholesterol 100.2 63.0 - 159.0 mg/dL    HDL/Chol Ratio 19.5 (L) 20.0 - 50.0 %    Total Cholesterol/HDL Ratio 5.1 (H) 2.0 - 5.0    Non-HDL Cholesterol 120 mg/dL   Hemoglobin A1c   Result Value Ref Range    Hemoglobin A1C 8.0 (H) 4.0 - 5.6 %    Estimated Avg Glucose 183 (H) 68 - 131 mg/dL   CBC with Automated Differential   Result Value Ref Range    WBC 13.63 (H) 3.90 - 12.70 K/uL    RBC 4.39 (L) 4.60 - 6.20 M/uL    Hemoglobin 12.7 (L) 14.0 - 18.0 g/dL    Hematocrit 38.6 (L) 40.0 - 54.0 %    MCV 88 82 - 98 fL    MCH 28.9 27.0 - 31.0 pg    MCHC 32.9 32.0 - 36.0 g/dL    RDW 13.4 11.5 - 14.5 %    Platelets 222 150 - 350 K/uL    MPV 9.3 9.2 - 12.9 fL    Gran # (ANC) 11.2 (H) 1.8 - 7.7 K/uL    Lymph # 0.8 (L) 1.0 - 4.8 K/uL    Mono # 1.6 (H) 0.3 - 1.0 K/uL    Eos # 0.0 0.0 - 0.5 K/uL    Baso # 0.01 0.00 - 0.20 K/uL    Gran% 82.2 (H) 38.0 - 73.0 %    Lymph% 6.1 (L) 18.0 - 48.0 %    Mono% 11.6 4.0 - 15.0 %    Eosinophil% 0.0 0.0 - 8.0 %    Basophil% 0.1 0.0 - 1.9 %    Differential Method Automated    PT/INR   Result Value Ref Range    Prothrombin Time 11.8 9.0 - 12.5 sec    INR 1.1 0.8 - 1.2   PTT   Result Value Ref Range    aPTT 33.1 (H) 21.0 - 32.0 sec   Tacrolimus level   Result Value Ref Range    Tacrolimus Lvl 5.3 5.0 - 15.0 ng/mL   Vancomycin, random   Result Value Ref Range    Vancomycin, Random 11.8 Not established ug/mL   T4, free   Result Value Ref Range    Free T4 1.10 0.71 - 1.51 ng/dL   POCT glucose   Result Value Ref Range    POCT Glucose 282 (H) 70 - 110 mg/dL   POCT glucose   Result Value Ref Range    POCT Glucose 203 (H) 70 - 110 mg/dL   POCT glucose   Result Value Ref Range    POCT Glucose 214 (H) 70 - 110 mg/dL   POCT glucose   Result Value Ref Range    POCT Glucose 223  (H) 70 - 110 mg/dL   POCT glucose   Result Value Ref Range    POCT Glucose 248 (H) 70 - 110 mg/dL   POCT glucose   Result Value Ref Range    POCT Glucose 246 (H) 70 - 110 mg/dL   POCT glucose   Result Value Ref Range    POCT Glucose 293 (H) 70 - 110 mg/dL     *Note: Due to a large number of results and/or encounters for the requested time period, some results have not been displayed. A complete set of results can be found in Results Review.         Imaging Results:  Imaging Results          X-Ray Foot Complete Left (Final result)  Result time 09/18/18 19:50:43    Final result by Nicolas Sawyer MD (09/18/18 19:50:43)                 Impression:      1.  Progressive erosive changes with bone loss involving the 1st distal phalanx concerning for osteomyelitis.    2.  Air bubbles within the left 5th toe, concerning for possible infectious process or gangrene.  Clinical correlation is advised.    3.  Progressive soft tissue loss involving the 4th toe, which could also indicate gangrene.  Clinical correlation is advised.    4.  Persistent healing fracture of the 2nd proximal phalanx.  Other stable findings as noted above.      Electronically signed by: Nicolas Sawyer MD  Date:    09/18/2018  Time:    19:50             Narrative:    EXAMINATION:  XR FOOT COMPLETE 3 VIEW LEFT    CLINICAL HISTORY:  Pain in left foot.  Gangrene, not elsewhere classified    TECHNIQUE:  AP, lateral and oblique views of the left foot were performed.    COMPARISON:  August 16, 2018    FINDINGS:  There is been interval bone loss involving the distal 1st phalanx, with overlying soft tissue defect.  Healing fracture of the 2nd proximal phalanx again seen.  There has been progressive soft tissue loss overlying the 4th toe and there are air bubbles in the 5th toe soft tissues.  No other bone loss is seen.    Plantar and Achilles calcaneal spurs.  Osteopenia.  Vascular calcifications.  Accessory ossicle adjacent to the navicular bone.                                CT Renal Stone Study ABD Pelvis WO (Final result)  Result time 09/18/18 18:31:38    Final result by Nicolas Sawyer MD (09/18/18 18:31:38)                 Impression:      1.  Negative for acute process involving the abdomen or pelvis.  Negative for inflammatory changes.  Normal appendix.  Negative for renal stone disease or hydronephrosis, in this patient who has atrophic native kidneys and a right lower quadrant transplant kidney.    2. Again seen is mild prominence of the transplant kidney pelvocaliceal system, without dilation of the ureter, likely a chronic finding.    3.  Mild distention of the gallbladder, which is otherwise normal, nonspecific finding.    4.  Fluid within the nondilated colon, which can be seen in patients with diarrhea or gastroenteritis.  Clinical correlation is advised.    5.  Dependent atelectasis in the lung bases.    6.  Moderate sliding-type hiatal hernia with possible distal esophageal thickening.  Does the patient have symptoms of esophagitis?    7.  Stable findings as noted above.    All CT scans at this facility are performed  using dose modulation techniques as appropriate to performed exam including the following:  automated exposure control; adjustment of mA and/or kV according to the patients size (this includes techniques or standardized protocols for targeted exams where dose is matched to indication/reason for exam: i.e. extremities or head);  iterative reconstruction technique.      Electronically signed by: Nicolas Sawyer MD  Date:    09/18/2018  Time:    18:31             Narrative:    EXAMINATION:  CT RENAL STONE STUDY ABD PELVIS WO    CLINICAL HISTORY:  Flank pain, stone disease suspected;    TECHNIQUE:  Axial images through the abdomen and pelvis were obtained without the use of IV contrast.  Sagittal and coronal reconstructions are provided for review.  Oral contrast was not utilized.    COMPARISON:  December 11, 2016    FINDINGS:  LUNG BASES: Respiratory  motion artifact is present on a number of images.  Subtle abnormalities could be overlooked.  Stable scarring or atelectasis in the inferior lingula and middle lobe.  Lung bases are otherwise clear.  Negative for pleural effusions.  There is a small amount of pericardial fluid.  Coronary artery calcifications.  Moderate size hiatal hernia with thickening of the distal esophagus.    ABDOMEN: The gallbladder is mildly distended.  No surrounding inflammatory changes or wall thickening is seen.  Bilateral perinephric stranding densities noted, with mild atrophy of both kidneys again seen.  Vascular calcifications within the spleen.  Again seen is mild prominence of the biliary tree measuring up to 8 mm.  The liver, spleen and gallbladder otherwise appear normal.  The pancreas is unremarkable.  Kidneys and adrenal glands are otherwise normal.  Negative for hydronephrosis or renal stone disease.  Significant vascular calcifications are present within the renal pelves, with bilateral renal sinus lipomatosis again seen.    There remains to be a transplant kidney in the right anterior hemipelvis.  Mild prominence of the renal collecting system is again seen.  Negative for perinephric stranding densities.  Negative for definite renal stones.    Negative for adenopathy, free fluid or inflammatory change noted within the abdomen or pelvis.  Vascular calcifications are present without aneurysmal changes, with the aorta measuring a maximum of 2.9 cm.    The small bowel appears normal. Normal appendix.  There is fluid within the proximal and mid colon, without dilation or surrounding inflammatory changes.    PELVIS: The urinary bladder is mildly thick wall, a stable finding.  Prostate calcifications.  The   pelvic organs are breast of e-mail.  There are pelvic phleboliths.    No significant osseous abnormality is identified.  Negative for significant spinal canal stenosis. Similar degree of degenerative disc changes throughout the  thoracic and lumbar spine, most significantly involving L3/L4.    Negative for groin adenopathy.    Postoperative changes involve the right pelvic abdominal wall from the transplant kidney placement.  The abdominal wall is intact.                               X-Ray Chest 1 View (Final result)  Result time 09/18/18 17:03:13    Final result by ANA CRISTINA Joseph Sr., MD (09/18/18 17:03:13)                 Impression:      1. There is a mild amount of haziness in the lateral aspect of the base of the left hemithorax.  This is characteristic of atelectasis or subtle pneumonia.  2. The left costophrenic angle is blunted.  This is characteristic of a tiny pleural effusion.  3. There is a mild amount of levoconvex curvature of the thoracic spine.  4. There is mild cardiomegaly.  .      Electronically signed by: Porter Joseph MD  Date:    09/18/2018  Time:    17:03             Narrative:    EXAMINATION:  XR CHEST 1 VIEW    CLINICAL HISTORY:  Adult failure to thrive    COMPARISON:  07/20/2018    FINDINGS:  The size of the heart is prominent.  There is a mild amount of haziness in the lateral aspect of the base of the left hemithorax.  The right lung is clear.  There is no pneumothorax.  The left costophrenic angle is blunted.  The right costophrenic angle is sharp.  There is a mild amount of levoconvex curvature of the thoracic spine.                               The EKG was ordered, reviewed, and independently interpreted by the ED provider.  Interpretation time: 1456  Rate: 107 BPM  Rhythm: sinus tachycardia  Interpretation: Borderline ECG. No STEMI.        The Emergency Provider reviewed the vital signs and test results, which are outlined above.    ED Discussion   4:00 PM: Dr. Lopez transfers care of pt to Dr. Oleary, pending imaging and lab results.    5:15 PM: Discussed pt with his sister. She reports that he lives with his 88 year old mother. She reports that she stopped by his house today and she realized that  "something "wasn't right" due to the state of disarray of his room (there were feces in the room). She reports he has a hx of kidney transplant. She reports that he already had a btk amputation of the right knee. She reports that his left foot has worsened substantially over the past 3 weeks. She reports that he had an appointment with Dr. Chapman (podiatry) on Friday but did not go to his appointment. He was prescribed abxs for the foot, and claims to have finished the entire course. Pt's family POC is Ms. Kecia Williamson. Her number is [phone]. Pt has blackened, gangrenous toes on the left foot. There is a wound to the great toe.    7:45 PM: Discussed case with Ryan Fowler NP, (Hospital Medicine). Dr. Dominguez agrees with current care and management of pt and accepts admission. Dr. Dominguez recommends using a doppler to check pulse on pt's foot.  Admitting Service: Hospital medicine   Admitting Physician: Alberto  Admit to: Telemetry    7:53 PM: Re-evaluated pt. I have discussed test results, shared treatment plan, and the need for admission with patient and family at bedside. Pt and family express understanding at this time and agree with all information. All questions answered. Pt and family have no further questions or concerns at this time. Pt is ready for admit.    8:11 PM: Discussed pt's case with Ryan Fowler NP. He states that they have decidie to consult with Dr. Estrada (nephrology) prior to admitting pt, due to pt's kidney transplant.    ED Medication(s):  Medications   piperacillin-tazobactam 4.5 g in dextrose 5 % 100 mL IVPB (ready to mix system) (4.5 g Intravenous New Bag 9/19/18 1732)   amLODIPine tablet 2.5 mg (2.5 mg Oral Given 9/19/18 0941)   aspirin EC tablet 81 mg (81 mg Oral Not Given 9/19/18 0900)   gabapentin capsule 100 mg (100 mg Oral Given 9/19/18 2042)   predniSONE tablet 5 mg (5 mg Oral Given 9/19/18 0944)   sodium bicarbonate tablet 2,600 mg (2,600 mg Oral Given 9/19/18 2042)   sodium chloride 0.9% " flush 5 mL (not administered)   acetaminophen tablet 650 mg (650 mg Oral Given 9/19/18 2042)   ondansetron injection 4 mg (not administered)   insulin aspart U-100 pen 0-5 Units (1 Units Subcutaneous Given 9/19/18 2051)   glucose chewable tablet 16 g (not administered)   glucose chewable tablet 24 g (not administered)   dextrose 50% injection 12.5 g (not administered)   dextrose 50% injection 25 g (not administered)   glucagon (human recombinant) injection 1 mg (not administered)   tacrolimus capsule 1.5 mg (1.5 mg Oral Given 9/19/18 1732)   arformoterol nebulizer solution 15 mcg (15 mcg Nebulization Not Given 9/19/18 1900)   budesonide nebulizer solution 0.5 mg (0.5 mg Nebulization Not Given 9/19/18 1900)   traMADol tablet 50 mg (50 mg Oral Given 9/19/18 1813)   olanzapine zydis disintegrating tablet 5 mg (5 mg Oral Given 9/19/18 2044)   pantoprazole EC tablet 40 mg (40 mg Oral Given 9/19/18 1033)   vancomycin in dextrose 5 % 1 gram/250 mL PLACEHOLDER DOSE (not administered)   sodium chloride 0.9% bolus 1,000 mL (0 mLs Intravenous Stopped 9/18/18 1627)   morphine injection 4 mg (4 mg Intravenous Given 9/18/18 1634)   ondansetron injection 4 mg (4 mg Intravenous Given 9/18/18 1634)   0.9%  NaCl infusion (0 mLs Intravenous Stopped 9/18/18 1636)   morphine injection 2 mg (2 mg Intravenous Given 9/18/18 2154)   vancomycin (VANCOCIN) 1,250 mg in dextrose 5 % 250 mL IVPB (1,250 mg Intravenous New Bag 9/19/18 0940)          Medication List      ASK your doctor about these medications    amLODIPine 2.5 MG tablet  Commonly known as:  NORVASC  Take 1 tablet (2.5 mg total) by mouth once daily.     aspirin 81 MG EC tablet  Commonly known as:  ECOTRIN  Take 1 tablet (81 mg total) by mouth once daily.     BD INSULIN SYRINGE ULTRA-FINE 1 mL 31 gauge x 5/16 Syrg  Generic drug:  insulin syringe-needle U-100  USE ONE AS DIRECTED FOUR TIMES DAILY WITH MEALS AND AT BEDTIME     blood sugar diagnostic Strp  1 each by Misc.(Non-Drug;  "Combo Route) route 3 (three) times daily.     blood-glucose meter kit  Use as instructed     budesonide-formoterol 160-4.5 mcg 160-4.5 mcg/actuation Hfaa  Commonly known as:  SYMBICORT  Inhale 2 puffs into the lungs every 12 (twelve) hours. Wash out mouth after using     ergocalciferol 50,000 unit Cap  Commonly known as:  ERGOCALCIFEROL  Take 1 capsule (50,000 Units total) by mouth every 7 days. Take on Mondays     gabapentin 300 MG capsule  Commonly known as:  NEURONTIN     HYDROcodone-acetaminophen  mg per tablet  Commonly known as:  NORCO  Take 1 tablet by mouth every 6 (six) hours as needed.     inhalation spacing device  Commonly known as:  BREATHERITE VALVED MDI CHAMBER  Use as directed for inhalation.     insulin  unit/mL injection  Commonly known as:  NovoLIN N NPH U-100 Insulin  Take 25 units subq with breakfast and 15 units at bedtime     lancets Misc  1 each by Misc.(Non-Drug; Combo Route) route 3 (three) times daily.     NovoLIN R Regular U-100 Insuln 100 unit/mL injection  Generic drug:  insulin regular  INJECT 6 UNITS WITH BREAKFAST AND 8 UNITS WITH DINNER, SUBCUTANEOUSLY 30 MIN BEFORE MEAL.     ondansetron 4 MG Tbdl  Commonly known as:  ZOFRAN-ODT  Take 1 tablet (4 mg total) by mouth every 8 (eight) hours as needed.     * pen needle, diabetic 32 gauge x 5/32" Ndle  Commonly known as:  EASY COMFORT PEN NEEDLES  Inject 1 each into the skin 3 (three) times daily.     * pen needle, diabetic 31 gauge x 1/4" Ndle  1 each by Misc.(Non-Drug; Combo Route) route 4 (four) times daily.     * BD ULTRA-FINE SHORT PEN NEEDLE 31 gauge x 5/16" Ndle  Generic drug:  pen needle, diabetic     predniSONE 5 MG tablet  Commonly known as:  DELTASONE  Take 1 tablet (5 mg total) by mouth once daily.     sodium bicarbonate 650 MG tablet  Take 4 tablets (2,600 mg total) by mouth 2 (two) times daily.     tacrolimus 0.5 MG Cap  Commonly known as:  PROGRAF  Take 3 capsules (1.5 mg total) by mouth every 12 (twelve) " hours. Z94.0 Kidney Transplant     zolpidem 5 MG Tab  Commonly known as:  AMBIEN  Take 2 tablets (10 mg total) by mouth nightly as needed.         * This list has 3 medication(s) that are the same as other medications prescribed for you. Read the directions carefully, and ask your doctor or other care provider to review them with you.                     Medical Decision Making    Medical Decision Making:   Clinical Tests:   Lab Tests: Ordered and Reviewed  Radiological Study: Ordered and Reviewed  Medical Tests: Ordered and Reviewed          Scribe Attestation:   Scribe #1: I performed the above scribed service and the documentation accurately describes the services I performed. I attest to the accuracy of the note.    Attending:   Physician Attestation Statement for Scribe #1: I, Jn Lopez MD, personally performed the services described in this documentation, as scribed by Ambar Grissom, in my presence, and it is both accurate and complete.       Scribe Attestation:   Scribe #2: I performed the above scribed service and the documentation accurately describes the services I performed. I attest to the accuracy of the note.    Attending Attestation:           Physician Attestation for Scribe:    Physician Attestation Statement for Scribe #2: I, Jackie Oleary MD, reviewed documentation, as scribed by Herbert Kyle in my presence, and it is both accurate and complete. I also acknowledge and confirm the content of the note done by Scribe #1.          Clinical Impression       ICD-10-CM ICD-9-CM   1. Dehydration E86.0 276.51   2. Flank pain R10.9 789.09   3. Failure to thrive in adult R62.7 783.7   4. Gangrene of left foot I96 785.4   5. Hyperglycemia R73.9 790.29   6. Sepsis A41.9 038.9     995.91   7. PVD (peripheral vascular disease) I73.9 443.9   8. Gas gangrene of foot A48.0 040.0   9. Type 2 diabetes mellitus with hyperglycemia, with long-term current use of insulin E11.65 250.00    Z79.4 790.29      V58.67       Disposition:   Disposition: Admitted  Condition: Fair         Jackie Oleary MD  09/19/18 2200       Jackie Oleary MD  09/19/18 2205

## 2018-09-18 NOTE — ED NOTES
Elderly Protective services contacted and report given.  EMS reported to ED staff that patient home/room was covered with feces.

## 2018-09-18 NOTE — PROGRESS NOTES
[facility] Consult / Dr Guzman:    Diagnosis: Skin/Soft Tissue, Kidney Transplant.  CrCl 45.2, SCR 1.9, WBC 16.72, Temp 98.3.   Vancomycin 1250 mg q48h pulse dosing. Random in AM.     Darcy Reynaga RPh. 9/18/2018 5:52 PM

## 2018-09-19 PROBLEM — M86.9 OSTEOMYELITIS OF LEFT FOOT: Status: ACTIVE | Noted: 2018-09-19

## 2018-09-19 PROBLEM — A48.0 GAS GANGRENE OF FOOT: Status: ACTIVE | Noted: 2018-09-19

## 2018-09-19 PROBLEM — R62.7 FAILURE TO THRIVE IN ADULT: Status: ACTIVE | Noted: 2018-09-19

## 2018-09-19 LAB
ANION GAP SERPL CALC-SCNC: 13 MMOL/L
APTT BLDCRRT: 33.1 SEC
BASOPHILS # BLD AUTO: 0.01 K/UL
BASOPHILS NFR BLD: 0.1 %
BUN SERPL-MCNC: 24 MG/DL
CALCIUM SERPL-MCNC: 8.9 MG/DL
CHLORIDE SERPL-SCNC: 99 MMOL/L
CHOLEST SERPL-MCNC: 149 MG/DL
CHOLEST/HDLC SERPL: 5.1 {RATIO}
CO2 SERPL-SCNC: 17 MMOL/L
CREAT SERPL-MCNC: 1.8 MG/DL
DIFFERENTIAL METHOD: ABNORMAL
EOSINOPHIL # BLD AUTO: 0 K/UL
EOSINOPHIL NFR BLD: 0 %
ERYTHROCYTE [DISTWIDTH] IN BLOOD BY AUTOMATED COUNT: 13.4 %
EST. GFR  (AFRICAN AMERICAN): 45 ML/MIN/1.73 M^2
EST. GFR  (NON AFRICAN AMERICAN): 39 ML/MIN/1.73 M^2
ESTIMATED AVG GLUCOSE: 183 MG/DL
GLUCOSE SERPL-MCNC: 204 MG/DL
HBA1C MFR BLD HPLC: 8 %
HCT VFR BLD AUTO: 38.6 %
HDLC SERPL-MCNC: 29 MG/DL
HDLC SERPL: 19.5 %
HGB BLD-MCNC: 12.7 G/DL
INR PPP: 1.1
LDLC SERPL CALC-MCNC: 100.2 MG/DL
LYMPHOCYTES # BLD AUTO: 0.8 K/UL
LYMPHOCYTES NFR BLD: 6.1 %
MCH RBC QN AUTO: 28.9 PG
MCHC RBC AUTO-ENTMCNC: 32.9 G/DL
MCV RBC AUTO: 88 FL
MONOCYTES # BLD AUTO: 1.6 K/UL
MONOCYTES NFR BLD: 11.6 %
NEUTROPHILS # BLD AUTO: 11.2 K/UL
NEUTROPHILS NFR BLD: 82.2 %
NONHDLC SERPL-MCNC: 120 MG/DL
PLATELET # BLD AUTO: 222 K/UL
PMV BLD AUTO: 9.3 FL
POCT GLUCOSE: 203 MG/DL (ref 70–110)
POCT GLUCOSE: 214 MG/DL (ref 70–110)
POCT GLUCOSE: 223 MG/DL (ref 70–110)
POCT GLUCOSE: 246 MG/DL (ref 70–110)
POCT GLUCOSE: 248 MG/DL (ref 70–110)
POCT GLUCOSE: 293 MG/DL (ref 70–110)
POTASSIUM SERPL-SCNC: 4.2 MMOL/L
PROTHROMBIN TIME: 11.8 SEC
RBC # BLD AUTO: 4.39 M/UL
SODIUM SERPL-SCNC: 129 MMOL/L
T4 FREE SERPL-MCNC: 1.1 NG/DL
TACROLIMUS BLD-MCNC: 5.3 NG/ML
TRIGL SERPL-MCNC: 99 MG/DL
TSH SERPL DL<=0.005 MIU/L-ACNC: 0.24 UIU/ML
VANCOMYCIN SERPL-MCNC: 11.8 UG/ML
WBC # BLD AUTO: 13.63 K/UL

## 2018-09-19 PROCEDURE — 80061 LIPID PANEL: CPT

## 2018-09-19 PROCEDURE — 94799 UNLISTED PULMONARY SVC/PX: CPT

## 2018-09-19 PROCEDURE — 83036 HEMOGLOBIN GLYCOSYLATED A1C: CPT

## 2018-09-19 PROCEDURE — 85730 THROMBOPLASTIN TIME PARTIAL: CPT

## 2018-09-19 PROCEDURE — 63600175 PHARM REV CODE 636 W HCPCS: Performed by: NURSE PRACTITIONER

## 2018-09-19 PROCEDURE — 80202 ASSAY OF VANCOMYCIN: CPT

## 2018-09-19 PROCEDURE — 84439 ASSAY OF FREE THYROXINE: CPT

## 2018-09-19 PROCEDURE — 99900035 HC TECH TIME PER 15 MIN (STAT)

## 2018-09-19 PROCEDURE — 63600175 PHARM REV CODE 636 W HCPCS: Performed by: INTERNAL MEDICINE

## 2018-09-19 PROCEDURE — 84443 ASSAY THYROID STIM HORMONE: CPT

## 2018-09-19 PROCEDURE — 80048 BASIC METABOLIC PNL TOTAL CA: CPT

## 2018-09-19 PROCEDURE — 94640 AIRWAY INHALATION TREATMENT: CPT

## 2018-09-19 PROCEDURE — 25000242 PHARM REV CODE 250 ALT 637 W/ HCPCS: Performed by: NURSE PRACTITIONER

## 2018-09-19 PROCEDURE — 85025 COMPLETE CBC W/AUTO DIFF WBC: CPT

## 2018-09-19 PROCEDURE — 99900037 HC PT THERAPY SCREENING (STAT)

## 2018-09-19 PROCEDURE — 36415 COLL VENOUS BLD VENIPUNCTURE: CPT

## 2018-09-19 PROCEDURE — 80197 ASSAY OF TACROLIMUS: CPT

## 2018-09-19 PROCEDURE — 85610 PROTHROMBIN TIME: CPT

## 2018-09-19 PROCEDURE — 25000003 PHARM REV CODE 250: Performed by: INTERNAL MEDICINE

## 2018-09-19 PROCEDURE — 63600175 PHARM REV CODE 636 W HCPCS: Performed by: EMERGENCY MEDICINE

## 2018-09-19 PROCEDURE — 25000003 PHARM REV CODE 250: Performed by: EMERGENCY MEDICINE

## 2018-09-19 PROCEDURE — 25000003 PHARM REV CODE 250: Performed by: NURSE PRACTITIONER

## 2018-09-19 PROCEDURE — 97802 MEDICAL NUTRITION INDIV IN: CPT

## 2018-09-19 PROCEDURE — 21400001 HC TELEMETRY ROOM

## 2018-09-19 PROCEDURE — 94761 N-INVAS EAR/PLS OXIMETRY MLT: CPT

## 2018-09-19 PROCEDURE — 99222 1ST HOSP IP/OBS MODERATE 55: CPT | Mod: ,,, | Performed by: INTERNAL MEDICINE

## 2018-09-19 RX ORDER — PANTOPRAZOLE SODIUM 40 MG/1
40 TABLET, DELAYED RELEASE ORAL DAILY
Status: DISCONTINUED | OUTPATIENT
Start: 2018-09-19 | End: 2018-09-26 | Stop reason: HOSPADM

## 2018-09-19 RX ORDER — OLANZAPINE 5 MG/1
5 TABLET, ORALLY DISINTEGRATING ORAL NIGHTLY
Status: DISCONTINUED | OUTPATIENT
Start: 2018-09-19 | End: 2018-09-26 | Stop reason: HOSPADM

## 2018-09-19 RX ORDER — VANCOMYCIN HCL IN 5 % DEXTROSE 1G/250ML
1000 PLASTIC BAG, INJECTION (ML) INTRAVENOUS
Status: DISCONTINUED | OUTPATIENT
Start: 2018-09-26 | End: 2018-09-20

## 2018-09-19 RX ADMIN — TRAMADOL HYDROCHLORIDE 50 MG: 50 TABLET, FILM COATED ORAL at 06:09

## 2018-09-19 RX ADMIN — VANCOMYCIN HYDROCHLORIDE 1250 MG: 10 INJECTION, POWDER, LYOPHILIZED, FOR SOLUTION INTRAVENOUS at 09:09

## 2018-09-19 RX ADMIN — TRAMADOL HYDROCHLORIDE 50 MG: 50 TABLET, FILM COATED ORAL at 09:09

## 2018-09-19 RX ADMIN — GABAPENTIN 100 MG: 100 CAPSULE ORAL at 09:09

## 2018-09-19 RX ADMIN — INSULIN ASPART 2 UNITS: 100 INJECTION, SOLUTION INTRAVENOUS; SUBCUTANEOUS at 11:09

## 2018-09-19 RX ADMIN — PIPERACILLIN SODIUM AND TAZOBACTAM SODIUM 4.5 G: 4; .5 INJECTION, POWDER, LYOPHILIZED, FOR SOLUTION INTRAVENOUS at 05:09

## 2018-09-19 RX ADMIN — SODIUM BICARBONATE 650 MG TABLET 2600 MG: at 09:09

## 2018-09-19 RX ADMIN — AMLODIPINE BESYLATE 2.5 MG: 2.5 TABLET ORAL at 09:09

## 2018-09-19 RX ADMIN — PIPERACILLIN SODIUM AND TAZOBACTAM SODIUM 4.5 G: 4; .5 INJECTION, POWDER, LYOPHILIZED, FOR SOLUTION INTRAVENOUS at 01:09

## 2018-09-19 RX ADMIN — OLANZAPINE 5 MG: 5 TABLET, ORALLY DISINTEGRATING ORAL at 02:09

## 2018-09-19 RX ADMIN — TRAMADOL HYDROCHLORIDE 50 MG: 50 TABLET, FILM COATED ORAL at 01:09

## 2018-09-19 RX ADMIN — OLANZAPINE 5 MG: 5 TABLET, ORALLY DISINTEGRATING ORAL at 08:09

## 2018-09-19 RX ADMIN — PIPERACILLIN SODIUM AND TAZOBACTAM SODIUM 4.5 G: 4; .5 INJECTION, POWDER, LYOPHILIZED, FOR SOLUTION INTRAVENOUS at 11:09

## 2018-09-19 RX ADMIN — INSULIN ASPART 2 UNITS: 100 INJECTION, SOLUTION INTRAVENOUS; SUBCUTANEOUS at 06:09

## 2018-09-19 RX ADMIN — ARFORMOTEROL TARTRATE 15 MCG: 15 SOLUTION RESPIRATORY (INHALATION) at 07:09

## 2018-09-19 RX ADMIN — TACROLIMUS 1.5 MG: 1 CAPSULE ORAL at 05:09

## 2018-09-19 RX ADMIN — PREDNISONE 5 MG: 5 TABLET ORAL at 09:09

## 2018-09-19 RX ADMIN — BUDESONIDE 0.5 MG: 0.5 SUSPENSION RESPIRATORY (INHALATION) at 07:09

## 2018-09-19 RX ADMIN — SODIUM BICARBONATE 650 MG TABLET 2600 MG: at 08:09

## 2018-09-19 RX ADMIN — GABAPENTIN 100 MG: 100 CAPSULE ORAL at 08:09

## 2018-09-19 RX ADMIN — ACETAMINOPHEN 650 MG: 325 TABLET ORAL at 08:09

## 2018-09-19 RX ADMIN — GABAPENTIN 100 MG: 100 CAPSULE ORAL at 05:09

## 2018-09-19 RX ADMIN — TACROLIMUS 1.5 MG: 1 CAPSULE ORAL at 09:09

## 2018-09-19 RX ADMIN — HEPARIN SODIUM 5000 UNITS: 5000 INJECTION, SOLUTION INTRAVENOUS; SUBCUTANEOUS at 05:09

## 2018-09-19 RX ADMIN — PANTOPRAZOLE SODIUM 40 MG: 40 TABLET, DELAYED RELEASE ORAL at 10:09

## 2018-09-19 RX ADMIN — INSULIN ASPART 1 UNITS: 100 INJECTION, SOLUTION INTRAVENOUS; SUBCUTANEOUS at 08:09

## 2018-09-19 NOTE — PLAN OF CARE
Attempted to complete d/c planning assessment. Pt asleep in room and did not wake for MSW. Assessment completed via chart review. Per notes, Teja Peoples MSW made EPS report yesterday due to living conditions patient was found in. Patient was confused, not taking his medication, and found in room with feces. PAOLA Forte Emelyn will follow for d/c needs.      09/19/18 1055   Discharge Assessment   Assessment Type Discharge Planning Assessment   Confirmed/corrected address and phone number on facesheet? Yes   Assessment information obtained from? Medical Record   Prior to hospitilization cognitive status: Unable to Assess   Prior to hospitalization functional status: Assistive Equipment;Needs Assistance   Current cognitive status: Unable to Assess   Current Functional Status: Assistive Equipment;Needs Assistance   Lives With alone   Able to Return to Prior Arrangements no   Is patient able to care for self after discharge? No   Who are your caregiver(s) and their phone number(s)? sisterKecia [phone]   Readmission Within The Last 30 Days no previous admission in last 30 days   Patient currently being followed by outpatient case management? No   Patient currently receives any other outside agency services? No   Equipment Currently Used at Home cane, straight;walker, standard;wheelchair   Is the patient taking medications as prescribed? no   Does the patient have transportation home? Yes   Transportation Available car;family or friend will provide   Discharge Plan A Home with family;Home   Discharge Plan B New Nursing Home placement - care home care facility   Patient/Family In Agreement With Plan unable to assess

## 2018-09-19 NOTE — PLAN OF CARE
Problem: Patient Care Overview  Goal: Plan of Care Review  Outcome: Ongoing (interventions implemented as appropriate)  Recommendations    Recommendation/Intervention:   1. Continue current diet order.   2. Will continue to monitor, provide diet education when pt more alert    Goals: Meal intake >50% at all meals  Nutrition Goal Status: new  Communication of RD Recs: (POC review, sticky note)

## 2018-09-19 NOTE — ASSESSMENT & PLAN NOTE
Gunjan/zosyn  Podiatry consult  Source of sepsis  -Toe amputation scheduled 9/19, possible foot amputation

## 2018-09-19 NOTE — HOSPITAL COURSE
The patient was admitted to Telemetry with gas gangrene of the left foot under the care of Hospital Medicine. He was started on IV vancomycin and IV Zosyn. Vascular Surgery was consulted and recommended a left BKA. Podiatry was consulted who also recommended a left BKA, but the patient refused, instead agreeing to a left TMA. Blood cultures on 9/18/18 grew MSSA. Repeat blood cultures on 9/21/18 were negative. On 9/21/18, the patient had a TMA. On 9/22/18, the patient was ambulating on his TMA and advised of his non-weight bearing status. On 9/24/18, the patient underwent wound closure by Podiatry. SNF was recommended, but the patient declined and requests home health and home IV infusion. Infectious Disease recommended IV Cefazolin every 8 hours for 14 days for his MSSA gangrenous infection. Plans for PICC line placement today.

## 2018-09-19 NOTE — PT/OT/SLP PROGRESS
Occupational Therapy      Patient Name:  Lavelle Ladd   MRN:  [id number]    Eval initiated via chart review and interview. . Pt adamantaly refused.  Will complete eval on later date  Lola Quintero OT  9/19/2018   [id number]

## 2018-09-19 NOTE — PT/OT/SLP PROGRESS
Physical Therapy      Patient Name:  Lavelle Ladd   MRN:  [id number]    JAMES ALSTON INITIATED THIS AM VIA CHART REVIEW AND PT INTERVIEW, PT REFUSED TO PARTICIPATE DESPITE MAX ENCOURAGEMENT AND EDUCATION, WILL RE-ATTEMPT COMPLETION OF JAMES ALSTON NEXT VISIT, NOTIFIED NURSE MARIPOSA OF PT'S STATUS    Kavita Arango, PT   9/19/2018  [id number]

## 2018-09-19 NOTE — PROGRESS NOTES
Patient has been evaluated by Dr. Willis, Vascular Surgery who recommends left BKA. Patient is undecided at this time. Will cancel toe amputation today until arterial studies completed per Podiatry. If patient has adequate circulation TMA will be considered. He will be evaluated by Podiatry in AM. Diet advanced. Further recommendations to follow pending arterial studies.

## 2018-09-19 NOTE — PROGRESS NOTES
[facility]  Vascular Surgery  Progress Note    Patient Name: Lavelle Ladd  MRN: [id number]  Admission Date: 9/18/2018  Primary Care Provider: Rishabh Esteban MD    Subjective:     Interval History: gangrene of toes left foot; also right BKA and hx of angio left leg     Post-Op Info:  Procedure(s) (LRB):  AMPUTATION, TOE (Left)  AMPUTATION, FOOT, TRANSMETATARSAL (Left)          Medications:  Continuous Infusions:  Scheduled Meds:   amLODIPine  2.5 mg Oral Daily    arformoterol  15 mcg Nebulization Q12H    aspirin  81 mg Oral Daily    budesonide  0.5 mg Nebulization Q12H    gabapentin  100 mg Oral TID    olanzapine zydis  5 mg Oral QHS    pantoprazole  40 mg Oral Daily    piperacillin-tazobactam (ZOSYN) IVPB  4.5 g Intravenous Q8H    predniSONE  5 mg Oral Daily    sodium bicarbonate  2,600 mg Oral BID    tacrolimus  1.5 mg Oral BID    [START ON 9/26/2018] vancomycin (VANCOCIN) IVPB  1,000 mg Intravenous Q48H     PRN Meds:acetaminophen, dextrose 50%, dextrose 50%, glucagon (human recombinant), glucose, glucose, insulin aspart U-100, ondansetron, sodium chloride 0.9%, traMADol     Objective:     Vital Signs (Most Recent):  Temp: 99.7 °F (37.6 °C) (09/19/18 1517)  Pulse: 97 (09/19/18 1517)  Resp: 18 (09/19/18 1517)  BP: (!) 170/63 (09/19/18 1517)  SpO2: (!) 93 % (09/19/18 1517) Vital Signs (24h Range):  Temp:  [96.9 °F (36.1 °C)-100 °F (37.8 °C)] 99.7 °F (37.6 °C)  Pulse:  [] 97  Resp:  [14-47] 18  SpO2:  [92 %-100 %] 93 %  BP: (104-183)/() 170/63          Physical Exam  Gangrene toes left foot and erythema dorsum of foot; good popliteal pulse but absent pedal pulses  Significant Labs:  All pertinent labs from the last 24 hours have been reviewed.    Significant Diagnostics:  I have reviewed all pertinent imaging results/findings within the past 24 hours.    Assessment/Plan:   Doubt TMA will heal - best option I believe would be BKA - will proceed if OK with patient and  family  Active Diagnoses:    Diagnosis Date Noted POA    PRINCIPAL PROBLEM:  Gas gangrene of foot [A48.0] 2018 Yes    Diabetes mellitus type 2, uncontrolled, with complications [E11.8, E11.65] 2018 Yes    Failure to thrive in adult [R62.7] 2018 Yes    Osteomyelitis of left foot [M86.9] 2018 Yes    Sepsis [A41.9] 2018 Yes    PVD (peripheral vascular disease) [I73.9] 2017 Yes     Chronic    Chronic kidney disease (CKD), stage III (moderate) [N18.3] 2016 Yes     Chronic     donor kidney transplant for DM 14 [Z94.0]  Not Applicable     Chronic    Essential hypertension [I10] 2015 Yes      Problems Resolved During this Admission:       Shelton Willis MD  Vascular Surgery  [facility]

## 2018-09-19 NOTE — CONSULTS
[facility] -   PODIATRIC MEDICINE AND SURGERY  CONSULTATION NOTE  2018  Hospital Length of Stay: 2    CONSULTING PHYSICIAN Ryan Rae    REASON FOR CONSULTATION left foot gangrene     CHIEF COMPLAINT left foot gangrene    HISTORY OF PRESENT ILLNESS    Lavelle Ladd is a 63 y.o. male  w/ PMH of PVD, RLE VKA 2/2 to atherosclerotic disease, ESRD, Renal transplant,  hx of Hep C, CAD, DM2 with neuropathy  and other multiple medical co-morbidities, who was admitted to the hospital for left foot gangrene.  Patient is well known to [facility] as he is a patient of my colleague, Dr. Magaña.  Patient has history of gangrene on left toe and apparently gangrenous changes appeared on left 4th and 5th toe over the past week.  Patient was admitted to hospital found to have gas gangrene on x-ray.  Vascular surgeon (Dr. Willis) has evaluated patient and recommend BKA.  Patient and sister at bedside and declines below-knee amputation.  Patient relates he would like to proceed with proximal foot amputation .  Arterial ultrasound performed and consistent with monophasic waveform ELLIE.     PMH   Past Medical History:   Diagnosis Date    DINORAH (acute kidney injury) 2016    Arthritis     Chronic obstructive pulmonary disease 2016    Coronary artery disease involving native coronary artery of native heart without angina pectoris 2016     donor kidney transplant for DM 16     Induction with Thymo x3 and IV solumedrol to total 875mg  Kidney Biopsy  2016: 16 glomeruli, ACR type 1 AVR type 2, significant microcirculatory changes, c4d negative, No DSA, 5 to10% fibrosis. Treated with thymo x8 2016- no rejection      Diastolic heart failure     Encounter for blood transfusion     ESRD on RRT since 10/2013 10/29/2013    Biopsy proven diabetic nephropathy and lymphoplasmacytic interstitial infiltrate not c/w with AIN (ddx sjogrens or assoc with tamm-horsefall protein  extravasation)     GERD (gastroesophageal reflux disease)     History of hepatitis C, s/p successful Rx w/ SVR12 - 4/2017 4/5/2017    Completed 12 weeks harvoni w/ SVR    Hyperlipidemia     Hypertension     PIC line (peripherally inserted central catheter) flush     Prophylactic immunotherapy     Proteinuria     PVD (peripheral vascular disease) 6/26/2017    RLE BKA CT 12/11/16 Extensive atherosclerotic disease of the aorta and mesenteric arteries.     Renal hypertension     Type 2 diabetes mellitus with diabetic neuropathy, with long-term current use of insulin 12/1/2016    Vitamin B12 deficiency         MEDS  Current Facility-Administered Medications   Medication Dose Route Frequency Provider Last Rate Last Dose    acetaminophen tablet 650 mg  650 mg Oral Q4H PRN Ryan Rae, NP   650 mg at 09/20/18 0621    amLODIPine tablet 2.5 mg  2.5 mg Oral Daily Ryan Rae, NP   2.5 mg at 09/20/18 0924    arformoterol nebulizer solution 15 mcg  15 mcg Nebulization Q12H Ryan Rae, NP   15 mcg at 09/20/18 0721    aspirin EC tablet 81 mg  81 mg Oral Daily Ryan Rae, NP   81 mg at 09/20/18 0924    budesonide nebulizer solution 0.5 mg  0.5 mg Nebulization Q12H Ryan Rae, NP   0.5 mg at 09/20/18 0715    dextrose 50% injection 12.5 g  12.5 g Intravenous PRN Ryan Rae NP        dextrose 50% injection 25 g  25 g Intravenous PRN Ryan Rae NP        gabapentin capsule 100 mg  100 mg Oral TID Ryan Rae NP   100 mg at 09/20/18 0924    glucagon (human recombinant) injection 1 mg  1 mg Intramuscular PRN Ryan Rae NP        glucose chewable tablet 16 g  16 g Oral PRN Ryan Rae NP        glucose chewable tablet 24 g  24 g Oral PRN Ryan Rae NP        hydrALAZINE injection 10 mg  10 mg Intravenous Q8H PRN Rodrick Dominguez MD   10 mg at 09/20/18 0443    HYDROcodone-acetaminophen  mg per tablet 1 tablet  1 tablet Oral TID PRN FAN Goodrich         insulin aspart U-100 pen 0-5 Units  0-5 Units Subcutaneous QID (AC + HS) PRN Ryan Rae NP   3 Units at 09/20/18 0610    olanzapine zydis disintegrating tablet 5 mg  5 mg Oral QHS Ryan Rae NP   5 mg at 09/19/18 2044    ondansetron injection 4 mg  4 mg Intravenous Q8H PRN Ryan Rae NP        pantoprazole EC tablet 40 mg  40 mg Oral Daily Ryan aRe NP   40 mg at 09/20/18 0924    piperacillin-tazobactam 4.5 g in dextrose 5 % 100 mL IVPB (ready to mix system)  4.5 g Intravenous Q8H Jackie Oleary MD 25 mL/hr at 09/20/18 0142 4.5 g at 09/20/18 0142    predniSONE tablet 5 mg  5 mg Oral Daily Ryan Rae NP   5 mg at 09/20/18 0924    sodium bicarbonate tablet 2,600 mg  2,600 mg Oral BID Ryan Rae NP   2,600 mg at 09/20/18 0923    sodium chloride 0.9% flush 5 mL  5 mL Intravenous PRN Ryan Rae NP        tacrolimus capsule 1.5 mg  1.5 mg Oral BID Ryan Rae NP   1.5 mg at 09/20/18 0924    [START ON 9/21/2018] vancomycin (VANCOCIN) 1,250 mg in dextrose 5 % 250 mL IVPB  1,250 mg Intravenous Q18H Stephanie Mueller MD           Current Facility-Administered Medications   Medication    acetaminophen tablet 650 mg    amLODIPine tablet 2.5 mg    arformoterol nebulizer solution 15 mcg    aspirin EC tablet 81 mg    budesonide nebulizer solution 0.5 mg    dextrose 50% injection 12.5 g    dextrose 50% injection 25 g    gabapentin capsule 100 mg    glucagon (human recombinant) injection 1 mg    glucose chewable tablet 16 g    glucose chewable tablet 24 g    hydrALAZINE injection 10 mg    HYDROcodone-acetaminophen  mg per tablet 1 tablet    insulin aspart U-100 pen 0-5 Units    olanzapine zydis disintegrating tablet 5 mg    ondansetron injection 4 mg    pantoprazole EC tablet 40 mg    piperacillin-tazobactam 4.5 g in dextrose 5 % 100 mL IVPB (ready to mix system)    predniSONE tablet 5 mg    sodium bicarbonate tablet 2,600 mg    sodium chloride 0.9%  flush 5 mL    tacrolimus capsule 1.5 mg    [START ON 2018] vancomycin (VANCOCIN) 1,250 mg in dextrose 5 % 250 mL IVPB       VITAL SIGNS (Last 24H):  Temp:  [97.1 °F (36.2 °C)-99.7 °F (37.6 °C)]   Pulse:  []   Resp:  [17-20]   BP: (123-214)/()   SpO2:  [88 %-95 %]     PSH   Past Surgical History:   Procedure Laterality Date    AORTOGRAPHY WITH SERIALOGRAPHY N/A 2018    Procedure: LEFT LEG ANGIOGRAM;  Surgeon: Donal Mcdonald MD;  Location: [facility] CATH LAB;  Service: Vascular;  Laterality: N/A;    av bovine graft      Left UE    AV FISTULA PLACEMENT      left UE    BIOPSY Tranplanted Kidney N/A 8/15/2016    Performed by Paulette Hanna MD at [facility] OR [address] [place] [address]    BIOPSY, LIVER, TRANSJUGULAR APPROACH N/A 2014    Performed by [facility] Diagnostic Provider at [facility] CATH LAB    CARDIAC CATHETERIZATION  2015    INSERTION, CATHETER, VASCULAR N/A 10/31/2013    Performed by Ralph Mckeon MD at [facility] CATH LAB    IRRIGATION AND DEBRIDEMENT Left 2018    Performed by Katerina Magaña DPM at [facility] OR    KIDNEY TRANSPLANT  2016    LEFT LEG ANGIOGRAM N/A 2018    Performed by Donal Mcdonald MD at [facility] CATH LAB    LEG AMPUTATION THROUGH KNEE      right LE, started as nail puncture leading to diabetic ulcer    TRANSPLANT-KIDNEY Right 2016    Performed by Ronny Bergeron MD at [facility] OR [address] [place] [address]        ALL  Review of patient's allergies indicates:  No Known Allergies     SOC    Social History     Tobacco Use    Smoking status: Former Smoker     Packs/day: 1.00     Years: 40.00     Pack years: 40.00     Last attempt to quit: 2013     Years since quittin.6    Smokeless tobacco: Never Used   Substance Use Topics    Alcohol use: Yes     Alcohol/week: 3.6 oz     Types: 6 Cans of beer per week     Comment: 6 beers/week    Drug use: No       Family HX  Family History   Problem Relation Age of Onset    Cancer Father     Diabetes Father     Heart failure Father     Stroke Father   "   Heart failure Mother     Kidney disease Neg Hx           OBJECTIVE  Temp (24hrs), Av.2 °F (36.8 °C), Min:97.1 °F (36.2 °C), Max:99.7 °F (37.6 °C)    Vitals:    18 1517 18 1520 18 1526 18 1900   BP: (!) 170/63      Pulse: 97   90   Resp: 18      Temp: 99.7 °F (37.6 °C)      TempSrc: Oral      SpO2: (!) 93%      Weight:  93.1 kg (205 lb 3.2 oz)     Height:  5' 11" (1.803 m) 5' 11" (1.803 m)     188 18 2100 18 2300 18 2305   BP: (!) 181/77   (!) 159/76   Pulse: 92  80 80   Resp: 18   17   Temp: 97.1 °F (36.2 °C)   97.9 °F (36.6 °C)   TempSrc: Oral   Oral   SpO2: (!) 94% (!) 94%  (!) 93%   Weight:       Height:        18 0105 18 0300 18 0429 18 0507   BP:   (!) 214/109 (!) 143/80   Pulse: 79 100 91 96   Resp:       Temp:   97.9 °F (36.6 °C)    TempSrc:   Oral    SpO2:   95%    Weight:       Height:        18 0715 18 0721 18 0735   BP:   123/83   Pulse: 94 96 94   Resp:  18   Temp:   98.2 °F (36.8 °C)   TempSrc:   Oral   SpO2: (!) 94% 95% (S) (!) 88%   Weight:      Height:          VITAL SIGNS (Last 24H):  Temp:  [97.1 °F (36.2 °C)-99.7 °F (37.6 °C)]   Pulse:  []   Resp:  [17-20]   BP: (123-214)/()   SpO2:  [88 %-95 %]     GENERAL  -14 point ROS negative other than stated in HPI  - Pleasant male with no apparent distress    LOWER EXTREMITY PHYSICAL EXAM  VASCULAR  Dorsalis pedis and posterior tibial pulses palpable 1/4  Capillary refill time immediate to the toes.   Feet are warm to the touch. Skin temperature warm to warm from proximally to distally   There is localized erythema left foot   There is left foot gross lower extremity edema.    DERMATOLOGIC  There is gangrenous changes to fourth and fifth digit, plantar pulp left hallux, there is  cyanosis to left second digit, there is malodor noted , negative drainage to above noted sites            NEUROLOGIC  Epicritic sensation is grossly " diminished  Babinski reflex absent    ORTHOPEDIC/BIOMECHANICAL  R BKA  Muscle strength AT/EHL/EDL/PT: 5/5; Achilles/Gastroc/Soleus: 5/5; PB/PL: 5/5 Muscle tone is normal.   There is no crepitus or fluctuance with palpation of left foot    SELECTED RESULTS  Recent Labs      09/18/18   1446  09/19/18   0520  09/20/18   0524   NA  127*  129*  131*   K  4.6  4.2  3.9   CL  96  99  99   CO2  13*  17*  17*   BUN  28*  24*  27*   ALT  13   --    --    AST  19   --    --      Lab Results   Component Value Date     (L) 09/20/2018    K 3.9 09/20/2018    CL 99 09/20/2018    CO2 17 (L) 09/20/2018     Lab Results   Component Value Date    WBC 14.14 (H) 09/20/2018    HGB 13.1 (L) 09/20/2018    HCT 38.7 (L) 09/20/2018    MCV 87 09/20/2018     09/20/2018     Lab Results   Component Value Date    SEDRATE 13 (H) 07/30/2018     Lab Results   Component Value Date    CRP 6.6 07/30/2018     Lab Results   Component Value Date    HGBA1C 8.0 (H) 09/19/2018       Reviewed and noted in plan where applicable. Please see chart for full laboratory data.    No results for input(s): CPK, CPKMB, TROPONINI, MB in the last 24 hours.   Recent Labs   Lab  09/20/18   1002   POCTGLUCOSE  379*        Lab Results   Component Value Date    INR 1.1 09/19/2018    INR 0.9 07/06/2018    INR 1.0 06/15/2017       Lab Results   Component Value Date    WBC 14.14 (H) 09/20/2018    HGB 13.1 (L) 09/20/2018    HCT 38.7 (L) 09/20/2018    MCV 87 09/20/2018     09/20/2018       BMP  Recent Labs   Lab  09/20/18   0524   GLU  274*   NA  131*   K  3.9   CL  99   CO2  17*   BUN  27*   CREATININE  1.7*   CALCIUM  9.1       INTAKE & OUTPUT (Last 24H):    Intake/Output Summary (Last 24 hours) at 9/20/2018 1107  Last data filed at 9/20/2018 1000  Gross per 24 hour   Intake --   Output 2200 ml   Net -2200 ml       RESULTS OF DIAGNOSTIC STUDIES  Imaging Results          X-Ray Foot Complete Left (Final result)  Result time 09/18/18 19:50:43    Final result by  Nicolas Sawyer MD (09/18/18 19:50:43)                 Impression:      1.  Progressive erosive changes with bone loss involving the 1st distal phalanx concerning for osteomyelitis.    2.  Air bubbles within the left 5th toe, concerning for possible infectious process or gangrene.  Clinical correlation is advised.    3.  Progressive soft tissue loss involving the 4th toe, which could also indicate gangrene.  Clinical correlation is advised.    4.  Persistent healing fracture of the 2nd proximal phalanx.  Other stable findings as noted above.      Electronically signed by: Nicolas Sawyer MD  Date:    09/18/2018  Time:    19:50             Narrative:    EXAMINATION:  XR FOOT COMPLETE 3 VIEW LEFT    CLINICAL HISTORY:  Pain in left foot.  Gangrene, not elsewhere classified    TECHNIQUE:  AP, lateral and oblique views of the left foot were performed.    COMPARISON:  August 16, 2018    FINDINGS:  There is been interval bone loss involving the distal 1st phalanx, with overlying soft tissue defect.  Healing fracture of the 2nd proximal phalanx again seen.  There has been progressive soft tissue loss overlying the 4th toe and there are air bubbles in the 5th toe soft tissues.  No other bone loss is seen.    Plantar and Achilles calcaneal spurs.  Osteopenia.  Vascular calcifications.  Accessory ossicle adjacent to the navicular bone.                               CT Renal Stone Study ABD Pelvis WO (Final result)  Result time 09/18/18 18:31:38    Final result by Nicolas Sawyer MD (09/18/18 18:31:38)                 Impression:      1.  Negative for acute process involving the abdomen or pelvis.  Negative for inflammatory changes.  Normal appendix.  Negative for renal stone disease or hydronephrosis, in this patient who has atrophic native kidneys and a right lower quadrant transplant kidney.    2. Again seen is mild prominence of the transplant kidney pelvocaliceal system, without dilation of the ureter, likely a chronic  finding.    3.  Mild distention of the gallbladder, which is otherwise normal, nonspecific finding.    4.  Fluid within the nondilated colon, which can be seen in patients with diarrhea or gastroenteritis.  Clinical correlation is advised.    5.  Dependent atelectasis in the lung bases.    6.  Moderate sliding-type hiatal hernia with possible distal esophageal thickening.  Does the patient have symptoms of esophagitis?    7.  Stable findings as noted above.    All CT scans at this facility are performed  using dose modulation techniques as appropriate to performed exam including the following:  automated exposure control; adjustment of mA and/or kV according to the patients size (this includes techniques or standardized protocols for targeted exams where dose is matched to indication/reason for exam: i.e. extremities or head);  iterative reconstruction technique.      Electronically signed by: Nicolas Sawyer MD  Date:    09/18/2018  Time:    18:31             Narrative:    EXAMINATION:  CT RENAL STONE STUDY ABD PELVIS WO    CLINICAL HISTORY:  Flank pain, stone disease suspected;    TECHNIQUE:  Axial images through the abdomen and pelvis were obtained without the use of IV contrast.  Sagittal and coronal reconstructions are provided for review.  Oral contrast was not utilized.    COMPARISON:  December 11, 2016    FINDINGS:  LUNG BASES: Respiratory motion artifact is present on a number of images.  Subtle abnormalities could be overlooked.  Stable scarring or atelectasis in the inferior lingula and middle lobe.  Lung bases are otherwise clear.  Negative for pleural effusions.  There is a small amount of pericardial fluid.  Coronary artery calcifications.  Moderate size hiatal hernia with thickening of the distal esophagus.    ABDOMEN: The gallbladder is mildly distended.  No surrounding inflammatory changes or wall thickening is seen.  Bilateral perinephric stranding densities noted, with mild atrophy of both kidneys  again seen.  Vascular calcifications within the spleen.  Again seen is mild prominence of the biliary tree measuring up to 8 mm.  The liver, spleen and gallbladder otherwise appear normal.  The pancreas is unremarkable.  Kidneys and adrenal glands are otherwise normal.  Negative for hydronephrosis or renal stone disease.  Significant vascular calcifications are present within the renal pelves, with bilateral renal sinus lipomatosis again seen.    There remains to be a transplant kidney in the right anterior hemipelvis.  Mild prominence of the renal collecting system is again seen.  Negative for perinephric stranding densities.  Negative for definite renal stones.    Negative for adenopathy, free fluid or inflammatory change noted within the abdomen or pelvis.  Vascular calcifications are present without aneurysmal changes, with the aorta measuring a maximum of 2.9 cm.    The small bowel appears normal. Normal appendix.  There is fluid within the proximal and mid colon, without dilation or surrounding inflammatory changes.    PELVIS: The urinary bladder is mildly thick wall, a stable finding.  Prostate calcifications.  The   pelvic organs are breast of e-mail.  There are pelvic phleboliths.    No significant osseous abnormality is identified.  Negative for significant spinal canal stenosis. Similar degree of degenerative disc changes throughout the thoracic and lumbar spine, most significantly involving L3/L4.    Negative for groin adenopathy.    Postoperative changes involve the right pelvic abdominal wall from the transplant kidney placement.  The abdominal wall is intact.                               X-Ray Chest 1 View (Final result)  Result time 09/18/18 17:03:13    Final result by ANA CRISTINA Joseph Sr., MD (09/18/18 17:03:13)                 Impression:      1. There is a mild amount of haziness in the lateral aspect of the base of the left hemithorax.  This is characteristic of atelectasis or subtle  pneumonia.  2. The left costophrenic angle is blunted.  This is characteristic of a tiny pleural effusion.  3. There is a mild amount of levoconvex curvature of the thoracic spine.  4. There is mild cardiomegaly.  .      Electronically signed by: Porter Joseph MD  Date:    09/18/2018  Time:    17:03             Narrative:    EXAMINATION:  XR CHEST 1 VIEW    CLINICAL HISTORY:  Adult failure to thrive    COMPARISON:  07/20/2018    FINDINGS:  The size of the heart is prominent.  There is a mild amount of haziness in the lateral aspect of the base of the left hemithorax.  The right lung is clear.  There is no pneumothorax.  The left costophrenic angle is blunted.  The right costophrenic angle is sharp.  There is a mild amount of levoconvex curvature of the thoracic spine.                                MICRO  Microbiology Results (last 7 days)     Procedure Component Value Units Date/Time    Blood Culture #2 **CANNOT BE ORDERED STAT** [[id number]] Collected:  09/18/18 1812    Order Status:  Completed Specimen:  Blood from Peripheral, Forearm, Right Updated:  09/20/18 0806     Blood Culture, Routine Gram stain aer bottle: Gram positive cocci in clusters resembling Staph      Blood Culture, Routine Gram stain susan bottle: Gram positive cocci in clusters resembling Staph      Blood Culture, Routine Results called to and read back by:NOA Eckert RN 09/19/2018  15:31     Blood Culture, Routine --     STAPHYLOCOCCUS AUREUS  ID consult required at [facility] and [facility].  For susceptibility see order # [id number]      Blood Culture #1 **CANNOT BE ORDERED STAT** [[id number]] Collected:  09/18/18 1800    Order Status:  Completed Specimen:  Blood from Peripheral, Lower Arm, Left Updated:  09/20/18 0804     Blood Culture, Routine Gram stain susan bottle: Gram positive cocci in clusters resembling Staph      Blood Culture, Routine Results called to and read back by:NOA Eckert RN 09/19/2018  15:30     Blood Culture,  Routine Gram stain aer bottle: Gram positive cocci in clusters resembling Staph      Blood Culture, Routine 09/19/2018  15:56     Blood Culture, Routine --     STAPHYLOCOCCUS AUREUS  Susceptibility pending  ID consult required at [facility] and [facility] locations.          Antibiotics (From admission, onward)    Start     Stop Route Frequency Ordered    09/21/18 0300  vancomycin (VANCOCIN) 1,250 mg in dextrose 5 % 250 mL IVPB      -- IV Every 18 hours 09/20/18 0758    09/18/18 1800  piperacillin-tazobactam 4.5 g in dextrose 5 % 100 mL IVPB (ready to mix system)      -- IV Every 8 hours (non-standard times) 09/18/18 1721          ASSESSMENT / PLAN    Sepsis  WBC elevated  Bacteremia - Staph  Life and/or limb threatening infection left lower extremity.  A thorough discussion was made with patient and sister at bedside this a.m..  I did discuss with patient all attempts with limb salvage can be performed and will be scheduled for transmetatarsal amputation of left lower extremity.  With that said, I do agree with vascular surgeon recommendations that proximal of BKA would be optimal prognosis as patient has poor healing potential and viability of amputation of foot is low. Arterial ultrasound reviewed- no stenosis noted. LYNN pending.  Patient does understand all risks associated and still relates that he is adamant about not proceeding with BKA as he already has a BKA on his contralateral extremity.  Surgery will be scheduled tomorrow, patient has been NPO after midnight.  Informed consent was provided to the patient written consent will be obtained tomorrow at bedside.  All questions were answered to patient and sister satisfaction.  Nurse orders to continue with Betadine paint to gangrenous digits.  DC anticoagulants 24 hours prior to surgery, restart post operatively  NPO after midnight      PVD (peripheral vascular disease)  Appreciate vascular recommendations. Pt declines BKA. Will proceed with TMA and  patient instructed if TMA fails, no further options except BKA.   LYNN pending  Arterial ultrasound reviewed     Gas gangrene of foot  Vanc/zosyn      Osteomyelitis of left foot  Vanc/zosyn      Report Electronically Signed By:     Karla Wheeler DPM   Podiatric Medicine & Surgery  [facility]  9/20/2018

## 2018-09-19 NOTE — HPI
HPI limited due to patient confusion and obtained from Er records.  Lavelle Ladd is a 63 y.o. male kidney transplant patient with hx of HTN, CAD, diastolic heart failure, Type 2 DM with diabetic neuropathy, ESRD, PVD, COPD who was brought to ED via EMS secondary to failure to thrive which has been present for an unknown amount of time. EMS reported that there was human and dog feces in patient's room and that patient does not want to take his medications. His CBG was 348. Patient with lower foot wound to left. Patient evalauted in ER. WBC 16.72, Na 127, Anion 18, Cr. 1.9, Trop 0.036, ,  CT renal:1. Negative for acute process involving the abdomen or pelvis. Negative for inflammatory changes. Normal appendix. Negative for renal stone disease or hydronephrosis, in this patient who has atrophic native kidneys and a right lower quadrant transplant kidney.2. Again seen is mild prominence of the transplant kidney pelvocaliceal system, without dilation of the ureter, likely a chronic finding.3. Mild distention of the gallbladder, which is otherwise normal, nonspecific finding.4. Fluid within the nondilated colon, which can be seen in patients with diarrhea or gastroenteritis. Clinical correlation is advised.5. Dependent atelectasis in the lung bases.6. Moderate sliding-type hiatal hernia with possible distal esophageal thickening. Does the patient have symptoms of esophagitis?7. Stable findings as noted above.  Chest Xray1. There is a mild amount of haziness in the lateral aspect of the base of the left hemithorax.  This is characteristic of atelectasis or subtle pneumonia.2. The left costophrenic angle is blunted.  This is characteristic of a tiny pleural effusion.3. There is a mild amount of levoconvex curvature of the thoracic spine.4. There is mild cardiomegaly. Xray left foot:1.  Progressive erosive changes with bone loss involving the 1st distal phalanx concerning for osteomyelitis.2.  Air bubbles within  the left 5th toe, concerning for possible infectious process or gangrene.  Clinical correlation is advised.3.  Progressive soft tissue loss involving the 4th toe, which could also indicate gangrene.  Clinical correlation is advised. Hosptial medicine consulted. Patient admitted    HPI Composed by Ryan Rae 9/19/18 0559

## 2018-09-19 NOTE — HPI
Patient is a 63-year-old male with type 1 diabetes with multiple complications including peripheral neuropathy and peripheral arterial disease.  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  Patient has been struggling with left foot wound and peripheral arterial disease along with osteomyelitis and gangrene of the left foot.  Patient is admitted with severe infection of the left foot with the possibility of needing surgical intervention.  Patient's baseline creatinine has been ranging between 1.6 and 1.9.  Patient's creatinine on admission is 1.9 and today is 1.8.  Patient is off and on confused and in bed social situation at home he was found to have feces all over himself.  Patient has an elderly mother at home as well with multiple social issues which are being addressed at this time.  Patient seen and examined in the hospital room bedside for renal transplant issues.  Patient has been on Prograf and prednisone.  No CellCept has been given to her at this time.

## 2018-09-19 NOTE — ASSESSMENT & PLAN NOTE
Consider flow studies pending course  Consider vascular consult pending cousre  poditary consult for foot woudn.

## 2018-09-19 NOTE — ASSESSMENT & PLAN NOTE
Dr. Estrada with renal consulted due to patient with transplant.  He reports patient can stay here  Renal consulted   Monitor rfp

## 2018-09-19 NOTE — SUBJECTIVE & OBJECTIVE
"Interval History: Patient is complaining of pain and says he is "not good." He is upset and does not want to talk. He does not check his sugar levels at home. He sees Dr. Magaña, Podiatry, as outpatient. He says he lives with his mother right now.     Review of Systems   Unable to perform ROS: Other   Patient not willing to provide history or answer questions.    Objective:     Vital Signs (Most Recent):  Temp: 96.9 °F (36.1 °C) (09/19/18 0713)  Pulse: 100 (09/19/18 1205)  Resp: 18 (09/19/18 1205)  BP: (!) 152/67 (09/19/18 1205)  SpO2: (!) 93 % (09/19/18 1205) Vital Signs (24h Range):  Temp:  [96.9 °F (36.1 °C)-100 °F (37.8 °C)] 96.9 °F (36.1 °C)  Pulse:  [100-111] 100  Resp:  [14-47] 18  SpO2:  [92 %-100 %] 93 %  BP: (104-183)/() 152/67     Weight: 93.1 kg (205 lb 3.2 oz)  Body mass index is 28.62 kg/m².    Intake/Output Summary (Last 24 hours) at 9/19/2018 1207  Last data filed at 9/18/2018 1636  Gross per 24 hour   Intake 2000 ml   Output --   Net 2000 ml      Physical Exam   Constitutional: He appears well-developed. He has a sickly appearance. He appears ill. No distress.   Elderly, unkept   HENT:   Head: Normocephalic and atraumatic.   Nose: Nose normal.   Eyes: Conjunctivae and EOM are normal. Pupils are equal, round, and reactive to light. No scleral icterus.   Neck: Normal range of motion. Neck supple. No tracheal deviation present.   Cardiovascular: Normal rate, regular rhythm, normal heart sounds and intact distal pulses.   No murmur heard.  Pulmonary/Chest: Effort normal and breath sounds normal. No stridor. No respiratory distress. He has no wheezes. He has no rales.   Abdominal: Soft. Bowel sounds are normal. He exhibits no distension. There is no tenderness. There is no guarding.   obese   Genitourinary:   Genitourinary Comments: See ua   Musculoskeletal: Normal range of motion. He exhibits edema (1+pitting to left foot.). He exhibits no deformity.   Amputation to right lower   Left foot wounds "   Neurological: No cranial nerve deficit.   Drowsy, oriented to person and situation     Skin: Skin is warm and dry. Capillary refill takes 2 to 3 seconds. He is not diaphoretic.   Unkept, dusky 2nd toe, necrotic wound to 4th and 5th toes.  See image of foot wound to left   Psychiatric:   Withdrawn, depressed    Nursing note and vitals reviewed.                Significant Labs:   Recent Lab Results       09/19/18  1117 09/19/18  1025 09/19/18  0916 09/19/18  0602 09/19/18  0520      Albumin          Alkaline Phosphatase          ALT          Amorphous, UA          Anion Gap     13     Appearance, UA          aPTT     33.1  Comment:  aPTT therapeutic range = 39-69 seconds     AST          Bacteria, UA          Baso #     0.01     Basophil%     0.1     Bilirubin (UA)          Total Bilirubin          Blood Culture, Routine          BNP          BUN, Bld     24     Calcium     8.9     Chloride     99     Cholesterol     149  Comment:  The National Cholesterol Education Program (NCEP) has set the  following guidelines (reference ranges) for Cholesterol:  Optimal.....................<200 mg/dL  Borderline High.............200-239 mg/dL  High........................> or = 240 mg/dL       CO2     17     Color, UA          CPK          CPK MB          Creatinine     1.8     Differential Method     Automated     eGFR if      45     eGFR if non      39  Comment:  Calculation used to obtain the estimated glomerular filtration  rate (eGFR) is the CKD-EPI equation.        Eos #     0.0     Eosinophil%     0.0     Estimated Avg Glucose     183     Free T4     1.10     Glucose     204     Glucose, UA          Gran # (ANC)     11.2     Gran%     82.2     HDL     29  Comment:  The National Cholesterol Education Program (NCEP) has set the  following guidelines (reference values) for HDL Cholesterol:  Low...............<40 mg/dL  Optimal...........>60 mg/dL       HDL/Chol Ratio     19.5     Hematocrit      38.6     Hemoglobin     12.7     Hemoglobin A1C     8.0  Comment:  ADA Screening Guidelines:  5.7-6.4%  Consistent with prediabetes  >or=6.5%  Consistent with diabetes  High levels of fetal hemoglobin interfere with the HbA1C  assay. Heterozygous hemoglobin variants (HbS, HgC, etc)do  not significantly interfere with this assay.   However, presence of multiple variants may affect accuracy.       Hyaline Casts, UA          Coumadin Monitoring INR     1.1  Comment:  Coumadin Therapy:  2.0 - 3.0 for INR for all indicators except mechanical heart valves  and antiphospholipid syndromes which should use 2.5 - 3.5.       Ketones, UA          Lactate, Massimo          LDL Cholesterol     100.2  Comment:  The National Cholesterol Education Program (NCEP) has set the  following guidelines (reference values) for LDL Cholesterol:  Optimal.......................<130 mg/dL  Borderline High...............130-159 mg/dL  High..........................160-189 mg/dL  Very High.....................>190 mg/dL       Leukocytes, UA          Lymph #     0.8     Lymph%     6.1     Magnesium          MB%          MCH     28.9     MCHC     32.9     MCV     88     Microscopic Comment          Mono #     1.6     Mono%     11.6     MPV     9.3     Nitrite, UA          Non-HDL Cholesterol     120  Comment:  Risk category and Non-HDL cholesterol goals:  Coronary heart disease (CHD)or equivalent (10-year risk of CHD >20%):  Non-HDL cholesterol goal     <130 mg/dL  Two or more CHD risk factors and 10-year risk of CHD <= 20%:  Non-HDL cholesterol goal     <160 mg/dL  0 to 1 CHD risk factor:  Non-HDL cholesterol goal     <190 mg/dL       Occult Blood UA          pH, UA          Phosphorus          Platelets     222     POCT Glucose 248 223 214 203      Potassium     4.2     Total Protein          Protein, UA          Protime     11.8     RBC     4.39     RBC, UA          RDW     13.4     Sodium     129     Specific Stendal, UA          Specimen UA           Total Cholesterol/HDL Ratio     5.1     Triglycerides     99  Comment:  The National Cholesterol Education Program (NCEP) has set the  following guidelines (reference values) for triglycerides:  Normal......................<150 mg/dL  Borderline High.............150-199 mg/dL  High........................200-499 mg/dL       Troponin I          TSH     0.238     Urobilinogen, UA          Vancomycin, Random     11.8     WBC, UA          WBC     13.63                 09/18/18  2200 09/18/18  1813 09/18/18  1812 09/18/18  1800 09/18/18  1706      Albumin          Alkaline Phosphatase          ALT          Amorphous, UA     Many     Anion Gap          Appearance, UA     Clear     aPTT          AST          Bacteria, UA     Few     Baso #          Basophil%          Bilirubin (UA)     2+  Comment:  Positive urine bilirubin is not confirmed. Correlate with   serum bilirubin and clinical presentation.       Total Bilirubin          Blood Culture, Routine   No Growth to date[P] No Growth to date[P]      BNP          BUN, Bld          Calcium          Chloride          Cholesterol          CO2          Color, UA     Yellow     CPK          CPK MB          Creatinine          Differential Method          eGFR if           eGFR if non           Eos #          Eosinophil%          Estimated Avg Glucose          Free T4          Glucose          Glucose, UA     2+     Gran # (ANC)          Gran%          HDL          HDL/Chol Ratio          Hematocrit          Hemoglobin          Hemoglobin A1C          Hyaline Casts, UA     0     Coumadin Monitoring INR          Ketones, UA     2+     Lactate, Massimo  1.2  Comment:  Falsely low lactic acid results can be found in samples   containing >=13.0 mg/dL total bilirubin and/or >=3.5 mg/dL   direct bilirubin.          LDL Cholesterol          Leukocytes, UA     Negative     Lymph #          Lymph%          Magnesium          MB%          MCH          MCHC           MCV          Microscopic Comment     SEE COMMENT  Comment:  Other formed elements not mentioned in the report are not   present in the microscopic examination.        Mono #          Mono%          MPV          Nitrite, UA     Negative     Non-HDL Cholesterol          Occult Blood UA     3+     pH, UA     6.0     Phosphorus          Platelets          POCT Glucose 282         Potassium          Total Protein          Protein, UA     3+  Comment:  Recommend a 24 hour urine protein or a urine   protein/creatinine ratio if globulin induced proteinuria is  clinically suspected.       Protime          RBC          RBC, UA     0     RDW          Sodium          Specific Gravity, UA     >=1.030     Specimen UA     Urine, Catheterized     Total Cholesterol/HDL Ratio          Triglycerides          Troponin I          TSH          Urobilinogen, UA     Negative     Vancomycin, Random          WBC, UA     0     WBC                      09/18/18  1446      Albumin 2.5     Alkaline Phosphatase 95     ALT 13     Amorphous, UA      Anion Gap 18     Appearance, UA      aPTT      AST 19     Bacteria, UA      Baso # 0.01     Basophil% 0.1     Bilirubin (UA)      Total Bilirubin 0.9  Comment:  For infants and newborns, interpretation of results should be based  on gestational age, weight and in agreement with clinical  observations.  Premature Infant recommended reference ranges:  Up to 24 hours.............<8.0 mg/dL  Up to 48 hours............<12.0 mg/dL  3-5 days..................<15.0 mg/dL  6-29 days.................<15.0 mg/dL       Blood Culture, Routine        Comment:  Values of less than 100 pg/ml are consistent with non-CHF populations.     BUN, Bld 28     Calcium 9.2     Chloride 96     Cholesterol      CO2 13     Color, UA      CPK 98      98     CPK MB 1.0     Creatinine 1.9     Differential Method Automated     eGFR if  42     eGFR if non  36  Comment:  Calculation used to  "obtain the estimated glomerular filtration  rate (eGFR) is the CKD-EPI equation.        Eos # 0.0     Eosinophil% 0.0     Estimated Avg Glucose      Free T4 1.15     Glucose 329     Glucose, UA      Gran # (ANC) 14.1     Gran% 84.0     HDL      HDL/Chol Ratio      Hematocrit 40.6     Hemoglobin 13.4     Hemoglobin A1C      Hyaline Casts, UA      Coumadin Monitoring INR      Ketones, UA      Lactate, Massimo      LDL Cholesterol      Leukocytes, UA      Lymph # 0.8     Lymph% 5.0     Magnesium 1.9     MB% 1.0  Comment:  To be positive, the MB% must be greater than 5% AND the CK-MB  greater than 6.5 ng/mL. Values not in the reference interval,   but not qualifying as positive, should be considered "trace".       MCH 29.1     MCHC 33.0     MCV 88     Microscopic Comment      Mono # 1.8     Mono% 10.9     MPV 9.8     Nitrite, UA      Non-HDL Cholesterol      Occult Blood UA      pH, UA      Phosphorus 2.1     Platelets 263     POCT Glucose      Potassium 4.6     Total Protein 7.1     Protein, UA      Protime      RBC 4.61     RBC, UA      RDW 13.5     Sodium 127     Specific Gravity, UA      Specimen UA      Total Cholesterol/HDL Ratio      Triglycerides      Troponin I 0.036  Comment:  The reference interval for Troponin I represents the 99th percentile   cutoff   for our facility and is consistent with 3rd generation assay   performance.       TSH 0.387     Urobilinogen, UA      Vancomycin, Random      WBC, UA      WBC 16.72         All pertinent labs within the past 24 hours have been reviewed.    Significant Imaging: I have reviewed all pertinent imaging results/findings within the past 24 hours.     Imaging Results          X-Ray Foot Complete Left (Final result)  Result time 09/18/18 19:50:43    Final result by Nicolas Sawyer MD (09/18/18 19:50:43)                 Impression:      1.  Progressive erosive changes with bone loss involving the 1st distal phalanx concerning for osteomyelitis.    2.  Air bubbles within " the left 5th toe, concerning for possible infectious process or gangrene.  Clinical correlation is advised.    3.  Progressive soft tissue loss involving the 4th toe, which could also indicate gangrene.  Clinical correlation is advised.    4.  Persistent healing fracture of the 2nd proximal phalanx.  Other stable findings as noted above.      Electronically signed by: Nicolas Sawyer MD  Date:    09/18/2018  Time:    19:50             Narrative:    EXAMINATION:  XR FOOT COMPLETE 3 VIEW LEFT    CLINICAL HISTORY:  Pain in left foot.  Gangrene, not elsewhere classified    TECHNIQUE:  AP, lateral and oblique views of the left foot were performed.    COMPARISON:  August 16, 2018    FINDINGS:  There is been interval bone loss involving the distal 1st phalanx, with overlying soft tissue defect.  Healing fracture of the 2nd proximal phalanx again seen.  There has been progressive soft tissue loss overlying the 4th toe and there are air bubbles in the 5th toe soft tissues.  No other bone loss is seen.    Plantar and Achilles calcaneal spurs.  Osteopenia.  Vascular calcifications.  Accessory ossicle adjacent to the navicular bone.                               CT Renal Stone Study ABD Pelvis WO (Final result)  Result time 09/18/18 18:31:38    Final result by Nicolas Sawyer MD (09/18/18 18:31:38)                 Impression:      1.  Negative for acute process involving the abdomen or pelvis.  Negative for inflammatory changes.  Normal appendix.  Negative for renal stone disease or hydronephrosis, in this patient who has atrophic native kidneys and a right lower quadrant transplant kidney.    2. Again seen is mild prominence of the transplant kidney pelvocaliceal system, without dilation of the ureter, likely a chronic finding.    3.  Mild distention of the gallbladder, which is otherwise normal, nonspecific finding.    4.  Fluid within the nondilated colon, which can be seen in patients with diarrhea or gastroenteritis.   Clinical correlation is advised.    5.  Dependent atelectasis in the lung bases.    6.  Moderate sliding-type hiatal hernia with possible distal esophageal thickening.  Does the patient have symptoms of esophagitis?    7.  Stable findings as noted above.    All CT scans at this facility are performed  using dose modulation techniques as appropriate to performed exam including the following:  automated exposure control; adjustment of mA and/or kV according to the patients size (this includes techniques or standardized protocols for targeted exams where dose is matched to indication/reason for exam: i.e. extremities or head);  iterative reconstruction technique.      Electronically signed by: Nicolas Sawyer MD  Date:    09/18/2018  Time:    18:31             Narrative:    EXAMINATION:  CT RENAL STONE STUDY ABD PELVIS WO    CLINICAL HISTORY:  Flank pain, stone disease suspected;    TECHNIQUE:  Axial images through the abdomen and pelvis were obtained without the use of IV contrast.  Sagittal and coronal reconstructions are provided for review.  Oral contrast was not utilized.    COMPARISON:  December 11, 2016    FINDINGS:  LUNG BASES: Respiratory motion artifact is present on a number of images.  Subtle abnormalities could be overlooked.  Stable scarring or atelectasis in the inferior lingula and middle lobe.  Lung bases are otherwise clear.  Negative for pleural effusions.  There is a small amount of pericardial fluid.  Coronary artery calcifications.  Moderate size hiatal hernia with thickening of the distal esophagus.    ABDOMEN: The gallbladder is mildly distended.  No surrounding inflammatory changes or wall thickening is seen.  Bilateral perinephric stranding densities noted, with mild atrophy of both kidneys again seen.  Vascular calcifications within the spleen.  Again seen is mild prominence of the biliary tree measuring up to 8 mm.  The liver, spleen and gallbladder otherwise appear normal.  The pancreas is  unremarkable.  Kidneys and adrenal glands are otherwise normal.  Negative for hydronephrosis or renal stone disease.  Significant vascular calcifications are present within the renal pelves, with bilateral renal sinus lipomatosis again seen.    There remains to be a transplant kidney in the right anterior hemipelvis.  Mild prominence of the renal collecting system is again seen.  Negative for perinephric stranding densities.  Negative for definite renal stones.    Negative for adenopathy, free fluid or inflammatory change noted within the abdomen or pelvis.  Vascular calcifications are present without aneurysmal changes, with the aorta measuring a maximum of 2.9 cm.    The small bowel appears normal. Normal appendix.  There is fluid within the proximal and mid colon, without dilation or surrounding inflammatory changes.    PELVIS: The urinary bladder is mildly thick wall, a stable finding.  Prostate calcifications.  The   pelvic organs are breast of e-mail.  There are pelvic phleboliths.    No significant osseous abnormality is identified.  Negative for significant spinal canal stenosis. Similar degree of degenerative disc changes throughout the thoracic and lumbar spine, most significantly involving L3/L4.    Negative for groin adenopathy.    Postoperative changes involve the right pelvic abdominal wall from the transplant kidney placement.  The abdominal wall is intact.                               X-Ray Chest 1 View (Final result)  Result time 09/18/18 17:03:13    Final result by ANA CRISTINA Joseph Sr., MD (09/18/18 17:03:13)                 Impression:      1. There is a mild amount of haziness in the lateral aspect of the base of the left hemithorax.  This is characteristic of atelectasis or subtle pneumonia.  2. The left costophrenic angle is blunted.  This is characteristic of a tiny pleural effusion.  3. There is a mild amount of levoconvex curvature of the thoracic spine.  4. There is mild  cardiomegaly.  .      Electronically signed by: Porter Joseph MD  Date:    09/18/2018  Time:    17:03             Narrative:    EXAMINATION:  XR CHEST 1 VIEW    CLINICAL HISTORY:  Adult failure to thrive    COMPARISON:  07/20/2018    FINDINGS:  The size of the heart is prominent.  There is a mild amount of haziness in the lateral aspect of the base of the left hemithorax.  The right lung is clear.  There is no pneumothorax.  The left costophrenic angle is blunted.  The right costophrenic angle is sharp.  There is a mild amount of levoconvex curvature of the thoracic spine.

## 2018-09-19 NOTE — SUBJECTIVE & OBJECTIVE
Past Medical History:   Diagnosis Date    DINORAH (acute kidney injury) 2016    Arthritis     Chronic obstructive pulmonary disease 2016    Coronary artery disease involving native coronary artery of native heart without angina pectoris 2016     donor kidney transplant for DM 16     Induction with Thymo x3 and IV solumedrol to total 875mg  Kidney Biopsy  2016: 16 glomeruli, ACR type 1 AVR type 2, significant microcirculatory changes, c4d negative, No DSA, 5 to10% fibrosis. Treated with thymo x8 2016- no rejection      Diastolic heart failure     Encounter for blood transfusion     ESRD on RRT since 10/2013 10/29/2013    Biopsy proven diabetic nephropathy and lymphoplasmacytic interstitial infiltrate not c/w with AIN (ddx sjogrens or assoc with tamm-horsefall protein extravasation)     GERD (gastroesophageal reflux disease)     History of hepatitis C, s/p successful Rx w/ SVR12 - 2017    Completed 12 weeks harvoni w/ SVR    Hyperlipidemia     Hypertension     PIC line (peripherally inserted central catheter) flush     Prophylactic immunotherapy     Proteinuria     PVD (peripheral vascular disease) 2017    RLE BKA CT 16 Extensive atherosclerotic disease of the aorta and mesenteric arteries.     Renal hypertension     Type 2 diabetes mellitus with diabetic neuropathy, with long-term current use of insulin 2016    Vitamin B12 deficiency        Past Surgical History:   Procedure Laterality Date    AORTOGRAPHY WITH SERIALOGRAPHY N/A 2018    Procedure: LEFT LEG ANGIOGRAM;  Surgeon: Donal Mcdonald MD;  Location: [facility] CATH LAB;  Service: Vascular;  Laterality: N/A;    av bovine graft      Left UE    AV FISTULA PLACEMENT      left UE    BIOPSY Tranplanted Kidney N/A 8/15/2016    Performed by Paulette Hanna MD at [facility] OR 2ND FLR    BIOPSY, LIVER, TRANSJUGULAR APPROACH N/A 2014    Performed by [facility] Diagnostic Provider at [facility] CATH LAB     "CARDIAC CATHETERIZATION  02/2015    INSERTION, CATHETER, VASCULAR N/A 10/31/2013    Performed by Ralph Mckeon MD at [facility] CATH LAB    IRRIGATION AND DEBRIDEMENT Left 7/9/2018    Performed by Katerina Magaña DPM at [facility] OR    KIDNEY TRANSPLANT  05/21/2016    LEFT LEG ANGIOGRAM N/A 6/14/2018    Performed by Donal Mcdonald MD at [facility] CATH LAB    LEG AMPUTATION THROUGH KNEE  2011    right LE, started as nail puncture leading to diabetic ulcer    TRANSPLANT-KIDNEY Right 5/21/2016    Performed by Ronny Bergeron MD at [facility] OR [address] [place] [address]       Review of patient's allergies indicates:  No Known Allergies    No current facility-administered medications on file prior to encounter.      Current Outpatient Medications on File Prior to Encounter   Medication Sig    amLODIPine (NORVASC) 2.5 MG tablet Take 1 tablet (2.5 mg total) by mouth once daily.    aspirin (ECOTRIN) 81 MG EC tablet Take 1 tablet (81 mg total) by mouth once daily.    BD INSULIN PEN NEEDLE UF SHORT 31 gauge x 5/16" Ndle     BD INSULIN SYRINGE ULTRA-FINE 1 mL 31 gauge x 5/16 Syrg USE ONE AS DIRECTED FOUR TIMES DAILY WITH MEALS AND AT BEDTIME    blood sugar diagnostic Strp 1 each by Misc.(Non-Drug; Combo Route) route 3 (three) times daily.    blood-glucose meter kit Use as instructed    budesonide-formoterol 160-4.5 mcg (SYMBICORT) 160-4.5 mcg/actuation HFAA Inhale 2 puffs into the lungs every 12 (twelve) hours. Wash out mouth after using    ergocalciferol (ERGOCALCIFEROL) 50,000 unit Cap Take 1 capsule (50,000 Units total) by mouth every 7 days. Take on Mondays    gabapentin (NEURONTIN) 300 MG capsule     HYDROcodone-acetaminophen (NORCO)  mg per tablet Take 1 tablet by mouth every 6 (six) hours as needed.    inhalation device (BREATHERITE VALVED MDI CHAMBER) Use as directed for inhalation.    insulin NPH (NOVOLIN N) 100 unit/mL injection Take 25 units subq with breakfast and 15 units at bedtime    lancets Misc 1 each by Misc.(Non-Drug; " "Combo Route) route 3 (three) times daily.    NOVOLIN R REGULAR U-100 INSULN 100 unit/mL injection INJECT 6 UNITS WITH BREAKFAST AND 8 UNITS WITH DINNER, SUBCUTANEOUSLY 30 MIN BEFORE MEAL.    ondansetron (ZOFRAN-ODT) 4 MG TbDL Take 1 tablet (4 mg total) by mouth every 8 (eight) hours as needed.    pen needle, diabetic (EASY COMFORT PEN NEEDLES) 32 gauge x 5/32" Ndle Inject 1 each into the skin 3 (three) times daily.    pen needle, diabetic 31 gauge x 1/4" Ndle 1 each by Misc.(Non-Drug; Combo Route) route 4 (four) times daily.    predniSONE (DELTASONE) 5 MG tablet Take 1 tablet (5 mg total) by mouth once daily.    sodium bicarbonate 650 MG tablet Take 4 tablets (2,600 mg total) by mouth 2 (two) times daily.    tacrolimus (PROGRAF) 0.5 MG Cap Take 3 capsules (1.5 mg total) by mouth every 12 (twelve) hours. Z94.0 Kidney Transplant    zolpidem (AMBIEN) 5 MG Tab Take 2 tablets (10 mg total) by mouth nightly as needed.     Family History     Problem Relation (Age of Onset)    Cancer Father    Diabetes Father    Heart failure Father, Mother    Stroke Father        Tobacco Use    Smoking status: Former Smoker     Packs/day: 1.00     Years: 40.00     Pack years: 40.00     Last attempt to quit: 2013     Years since quittin.6    Smokeless tobacco: Never Used   Substance and Sexual Activity    Alcohol use: Yes     Alcohol/week: 3.6 oz     Types: 6 Cans of beer per week     Comment: 6 beers/week    Drug use: No    Sexual activity: No     Review of Systems   Unable to perform ROS: Mental status change     Objective:     Vital Signs (Most Recent):  Temp: 98.6 °F (37 °C) (18)  Pulse: 108 (18)  Resp: 18 (18)  BP: (!) 174/93 (18)  SpO2: (!) 92 % (18) Vital Signs (24h Range):  Temp:  [98.3 °F (36.8 °C)-100 °F (37.8 °C)] 98.6 °F (37 °C)  Pulse:  [100-110] 108  Resp:  [16-47] 18  SpO2:  [92 %-100 %] 92 %  BP: (104-183)/() 174/93     Weight: 93.1 kg (205 lb " 3.2 oz)  Body mass index is 28.62 kg/m².    Physical Exam   Constitutional: He appears well-developed. He has a sickly appearance. He appears ill. No distress.   Elderly, unkept   HENT:   Head: Normocephalic and atraumatic.   Nose: Nose normal.   Eyes: Conjunctivae and EOM are normal. Pupils are equal, round, and reactive to light. No scleral icterus.   Neck: Normal range of motion. Neck supple. No tracheal deviation present.   Cardiovascular: Normal rate, regular rhythm, normal heart sounds and intact distal pulses.   No murmur heard.  Pulmonary/Chest: Effort normal and breath sounds normal. No stridor. No respiratory distress. He has no wheezes. He has no rales.   Abdominal: Soft. Bowel sounds are normal. He exhibits no distension. There is no tenderness. There is no guarding.   obese   Genitourinary:   Genitourinary Comments: See ua   Musculoskeletal: Normal range of motion. He exhibits no edema or deformity.   Amputation to right lower   Left foot wounds   Neurological: No cranial nerve deficit.   Drowsy, oriented to person and situation     Skin: Skin is warm and dry. Capillary refill takes 2 to 3 seconds. No rash noted. He is not diaphoretic.   Unkept,   See image of foot wound to left   Psychiatric:   Withdrawn, depressed    Nursing note and vitals reviewed.        CRANIAL NERVES     CN III, IV, VI   Pupils are equal, round, and reactive to light.  Extraocular motions are normal.                Significant Labs: All pertinent labs within the past 24 hours have been reviewed.  Results for orders placed or performed during the hospital encounter of 09/18/18   CBC auto differential   Result Value Ref Range    WBC 16.72 (H) 3.90 - 12.70 K/uL    RBC 4.61 4.60 - 6.20 M/uL    Hemoglobin 13.4 (L) 14.0 - 18.0 g/dL    Hematocrit 40.6 40.0 - 54.0 %    MCV 88 82 - 98 fL    MCH 29.1 27.0 - 31.0 pg    MCHC 33.0 32.0 - 36.0 g/dL    RDW 13.5 11.5 - 14.5 %    Platelets 263 150 - 350 K/uL    MPV 9.8 9.2 - 12.9 fL    Gran #  (ANC) 14.1 (H) 1.8 - 7.7 K/uL    Lymph # 0.8 (L) 1.0 - 4.8 K/uL    Mono # 1.8 (H) 0.3 - 1.0 K/uL    Eos # 0.0 0.0 - 0.5 K/uL    Baso # 0.01 0.00 - 0.20 K/uL    Gran% 84.0 (H) 38.0 - 73.0 %    Lymph% 5.0 (L) 18.0 - 48.0 %    Mono% 10.9 4.0 - 15.0 %    Eosinophil% 0.0 0.0 - 8.0 %    Basophil% 0.1 0.0 - 1.9 %    Differential Method Automated    Urinalysis   Result Value Ref Range    Specimen UA Urine, Catheterized     Color, UA Yellow Yellow, Straw, Jessa    Appearance, UA Clear Clear    pH, UA 6.0 5.0 - 8.0    Specific Gravity, UA >=1.030 (A) 1.005 - 1.030    Protein, UA 3+ (A) Negative    Glucose, UA 2+ (A) Negative    Ketones, UA 2+ (A) Negative    Bilirubin (UA) 2+ (A) Negative    Occult Blood UA 3+ (A) Negative    Nitrite, UA Negative Negative    Urobilinogen, UA Negative <2.0 EU/dL    Leukocytes, UA Negative Negative   TSH   Result Value Ref Range    TSH 0.387 (L) 0.400 - 4.000 uIU/mL   CK   Result Value Ref Range    CPK 98 20 - 200 U/L   CK-MB   Result Value Ref Range    CPK 98 20 - 200 U/L    CPK MB 1.0 0.1 - 6.5 ng/mL    MB% 1.0 0.0 - 5.0 %   Troponin I   Result Value Ref Range    Troponin I 0.036 (H) 0.000 - 0.026 ng/mL   Brain natriuretic peptide   Result Value Ref Range     (H) 0 - 99 pg/mL   Comprehensive metabolic panel   Result Value Ref Range    Sodium 127 (L) 136 - 145 mmol/L    Potassium 4.6 3.5 - 5.1 mmol/L    Chloride 96 95 - 110 mmol/L    CO2 13 (L) 23 - 29 mmol/L    Glucose 329 (H) 70 - 110 mg/dL    BUN, Bld 28 (H) 8 - 23 mg/dL    Creatinine 1.9 (H) 0.5 - 1.4 mg/dL    Calcium 9.2 8.7 - 10.5 mg/dL    Total Protein 7.1 6.0 - 8.4 g/dL    Albumin 2.5 (L) 3.5 - 5.2 g/dL    Total Bilirubin 0.9 0.1 - 1.0 mg/dL    Alkaline Phosphatase 95 55 - 135 U/L    AST 19 10 - 40 U/L    ALT 13 10 - 44 U/L    Anion Gap 18 (H) 8 - 16 mmol/L    eGFR if African American 42 (A) >60 mL/min/1.73 m^2    eGFR if non African American 36 (A) >60 mL/min/1.73 m^2   T4, free   Result Value Ref Range    Free T4 1.15 0.71 -  1.51 ng/dL   Urinalysis Microscopic   Result Value Ref Range    RBC, UA 0 0 - 4 /hpf    WBC, UA 0 0 - 5 /hpf    Bacteria, UA Few (A) None-Occ /hpf    Hyaline Casts, UA 0 0-1/lpf /lpf    Amorphous, UA Many (A) None-Moderate    Microscopic Comment SEE COMMENT    Lactic acid, plasma   Result Value Ref Range    Lactate (Lactic Acid) 1.2 0.5 - 2.2 mmol/L   Phosphorus   Result Value Ref Range    Phosphorus 2.1 (L) 2.7 - 4.5 mg/dL   Magnesium   Result Value Ref Range    Magnesium 1.9 1.6 - 2.6 mg/dL   CBC with Automated Differential   Result Value Ref Range    WBC 13.63 (H) 3.90 - 12.70 K/uL    RBC 4.39 (L) 4.60 - 6.20 M/uL    Hemoglobin 12.7 (L) 14.0 - 18.0 g/dL    Hematocrit 38.6 (L) 40.0 - 54.0 %    MCV 88 82 - 98 fL    MCH 28.9 27.0 - 31.0 pg    MCHC 32.9 32.0 - 36.0 g/dL    RDW 13.4 11.5 - 14.5 %    Platelets 222 150 - 350 K/uL    MPV 9.3 9.2 - 12.9 fL    Gran # (ANC) 11.2 (H) 1.8 - 7.7 K/uL    Lymph # 0.8 (L) 1.0 - 4.8 K/uL    Mono # 1.6 (H) 0.3 - 1.0 K/uL    Eos # 0.0 0.0 - 0.5 K/uL    Baso # 0.01 0.00 - 0.20 K/uL    Gran% 82.2 (H) 38.0 - 73.0 %    Lymph% 6.1 (L) 18.0 - 48.0 %    Mono% 11.6 4.0 - 15.0 %    Eosinophil% 0.0 0.0 - 8.0 %    Basophil% 0.1 0.0 - 1.9 %    Differential Method Automated    PT/INR   Result Value Ref Range    Prothrombin Time 11.8 9.0 - 12.5 sec    INR 1.1 0.8 - 1.2   PTT   Result Value Ref Range    aPTT 33.1 (H) 21.0 - 32.0 sec   Vancomycin, random   Result Value Ref Range    Vancomycin, Random 11.8 Not established ug/mL   POCT glucose   Result Value Ref Range    POCT Glucose 282 (H) 70 - 110 mg/dL     *Note: Due to a large number of results and/or encounters for the requested time period, some results have not been displayed. A complete set of results can be found in Results Review.       Significant Imaging: I have reviewed all pertinent imaging results/findings within the past 24 hours.   Imaging Results          X-Ray Foot Complete Left (Final result)  Result time 09/18/18 19:50:43     Final result by Nicolas Sawyer MD (09/18/18 19:50:43)                 Impression:      1.  Progressive erosive changes with bone loss involving the 1st distal phalanx concerning for osteomyelitis.    2.  Air bubbles within the left 5th toe, concerning for possible infectious process or gangrene.  Clinical correlation is advised.    3.  Progressive soft tissue loss involving the 4th toe, which could also indicate gangrene.  Clinical correlation is advised.    4.  Persistent healing fracture of the 2nd proximal phalanx.  Other stable findings as noted above.      Electronically signed by: Nicolas Sawyer MD  Date:    09/18/2018  Time:    19:50             Narrative:    EXAMINATION:  XR FOOT COMPLETE 3 VIEW LEFT    CLINICAL HISTORY:  Pain in left foot.  Gangrene, not elsewhere classified    TECHNIQUE:  AP, lateral and oblique views of the left foot were performed.    COMPARISON:  August 16, 2018    FINDINGS:  There is been interval bone loss involving the distal 1st phalanx, with overlying soft tissue defect.  Healing fracture of the 2nd proximal phalanx again seen.  There has been progressive soft tissue loss overlying the 4th toe and there are air bubbles in the 5th toe soft tissues.  No other bone loss is seen.    Plantar and Achilles calcaneal spurs.  Osteopenia.  Vascular calcifications.  Accessory ossicle adjacent to the navicular bone.                               CT Renal Stone Study ABD Pelvis WO (Final result)  Result time 09/18/18 18:31:38    Final result by Nicolas Sawyer MD (09/18/18 18:31:38)                 Impression:      1.  Negative for acute process involving the abdomen or pelvis.  Negative for inflammatory changes.  Normal appendix.  Negative for renal stone disease or hydronephrosis, in this patient who has atrophic native kidneys and a right lower quadrant transplant kidney.    2. Again seen is mild prominence of the transplant kidney pelvocaliceal system, without dilation of the ureter, likely  a chronic finding.    3.  Mild distention of the gallbladder, which is otherwise normal, nonspecific finding.    4.  Fluid within the nondilated colon, which can be seen in patients with diarrhea or gastroenteritis.  Clinical correlation is advised.    5.  Dependent atelectasis in the lung bases.    6.  Moderate sliding-type hiatal hernia with possible distal esophageal thickening.  Does the patient have symptoms of esophagitis?    7.  Stable findings as noted above.    All CT scans at this facility are performed  using dose modulation techniques as appropriate to performed exam including the following:  automated exposure control; adjustment of mA and/or kV according to the patients size (this includes techniques or standardized protocols for targeted exams where dose is matched to indication/reason for exam: i.e. extremities or head);  iterative reconstruction technique.      Electronically signed by: Nicolas Sawyer MD  Date:    09/18/2018  Time:    18:31             Narrative:    EXAMINATION:  CT RENAL STONE STUDY ABD PELVIS WO    CLINICAL HISTORY:  Flank pain, stone disease suspected;    TECHNIQUE:  Axial images through the abdomen and pelvis were obtained without the use of IV contrast.  Sagittal and coronal reconstructions are provided for review.  Oral contrast was not utilized.    COMPARISON:  December 11, 2016    FINDINGS:  LUNG BASES: Respiratory motion artifact is present on a number of images.  Subtle abnormalities could be overlooked.  Stable scarring or atelectasis in the inferior lingula and middle lobe.  Lung bases are otherwise clear.  Negative for pleural effusions.  There is a small amount of pericardial fluid.  Coronary artery calcifications.  Moderate size hiatal hernia with thickening of the distal esophagus.    ABDOMEN: The gallbladder is mildly distended.  No surrounding inflammatory changes or wall thickening is seen.  Bilateral perinephric stranding densities noted, with mild atrophy of both  kidneys again seen.  Vascular calcifications within the spleen.  Again seen is mild prominence of the biliary tree measuring up to 8 mm.  The liver, spleen and gallbladder otherwise appear normal.  The pancreas is unremarkable.  Kidneys and adrenal glands are otherwise normal.  Negative for hydronephrosis or renal stone disease.  Significant vascular calcifications are present within the renal pelves, with bilateral renal sinus lipomatosis again seen.    There remains to be a transplant kidney in the right anterior hemipelvis.  Mild prominence of the renal collecting system is again seen.  Negative for perinephric stranding densities.  Negative for definite renal stones.    Negative for adenopathy, free fluid or inflammatory change noted within the abdomen or pelvis.  Vascular calcifications are present without aneurysmal changes, with the aorta measuring a maximum of 2.9 cm.    The small bowel appears normal. Normal appendix.  There is fluid within the proximal and mid colon, without dilation or surrounding inflammatory changes.    PELVIS: The urinary bladder is mildly thick wall, a stable finding.  Prostate calcifications.  The   pelvic organs are breast of e-mail.  There are pelvic phleboliths.    No significant osseous abnormality is identified.  Negative for significant spinal canal stenosis. Similar degree of degenerative disc changes throughout the thoracic and lumbar spine, most significantly involving L3/L4.    Negative for groin adenopathy.    Postoperative changes involve the right pelvic abdominal wall from the transplant kidney placement.  The abdominal wall is intact.                               X-Ray Chest 1 View (Final result)  Result time 09/18/18 17:03:13    Final result by ANA CRISTINA Joseph Sr., MD (09/18/18 17:03:13)                 Impression:      1. There is a mild amount of haziness in the lateral aspect of the base of the left hemithorax.  This is characteristic of atelectasis or subtle  pneumonia.  2. The left costophrenic angle is blunted.  This is characteristic of a tiny pleural effusion.  3. There is a mild amount of levoconvex curvature of the thoracic spine.  4. There is mild cardiomegaly.  .      Electronically signed by: Porter Joseph MD  Date:    09/18/2018  Time:    17:03             Narrative:    EXAMINATION:  XR CHEST 1 VIEW    CLINICAL HISTORY:  Adult failure to thrive    COMPARISON:  07/20/2018    FINDINGS:  The size of the heart is prominent.  There is a mild amount of haziness in the lateral aspect of the base of the left hemithorax.  The right lung is clear.  There is no pneumothorax.  The left costophrenic angle is blunted.  The right costophrenic angle is sharp.  There is a mild amount of levoconvex curvature of the thoracic spine.

## 2018-09-19 NOTE — CONSULTS
Consulted on this 64 y/o M patient due to necrotic left foot wounds. Podiatry consult also noted and patient was seen in podiatry clinic 10 days ago.  On this visit, patient lying in bed with eyes closed.  He opens eyes to light touch, follows commands, but does not participate in care at this time.  Right BKA noted.  Left distal foot wet gangrene noted. Left great toe with exposed bone and small amount tan creamy drainage from intact black eschar.  Left 4th and 5th toes necrotic with moist tan borders.  Foul odor noted.  Painted all with betadine at this time and await podiatry consultation.  Sacrum, coccyx, bilateral buttock assessed with no redness or breakdown noted.  Left distal knee abrasion with intact scab, cleansed with saline and patted dry. Painted with cavilon and covered with tegaderm.  Defer to podiatry for further wound management.

## 2018-09-19 NOTE — CONSULTS
"  [facility] -   Adult Nutrition  Consult Note    SUMMARY     Recommendations    Recommendation/Intervention:   1. Continue current diet order.   2. Will continue to monitor, provide diet education when pt more alert    Goals: Meal intake >50% at all meals  Nutrition Goal Status: new  Communication of RD Recs: (POC review, sticky note)    Reason for Assessment    Reason for Assessment: consult("assess needs")  Dx:  1. Dehydration    2. Flank pain    3. Failure to thrive in adult    4. Gangrene of left foot    5. Hyperglycemia    6. Sepsis    7. PVD (peripheral vascular disease)    8. Gas gangrene of foot    9. Type 2 diabetes mellitus with hyperglycemia, with long-term current use of insulin      Past Medical History:   Diagnosis Date    DINORAH (acute kidney injury) 2016    Arthritis     Chronic obstructive pulmonary disease 2016    Coronary artery disease involving native coronary artery of native heart without angina pectoris 2016     donor kidney transplant for DM 16     Induction with Thymo x3 and IV solumedrol to total 875mg  Kidney Biopsy  2016: 16 glomeruli, ACR type 1 AVR type 2, significant microcirculatory changes, c4d negative, No DSA, 5 to10% fibrosis. Treated with thymo x8 2016- no rejection      Diastolic heart failure     Encounter for blood transfusion     ESRD on RRT since 10/2013 10/29/2013    Biopsy proven diabetic nephropathy and lymphoplasmacytic interstitial infiltrate not c/w with AIN (ddx sjogrens or assoc with tamm-horsefall protein extravasation)     GERD (gastroesophageal reflux disease)     History of hepatitis C, s/p successful Rx w/ SVR12 - 2017    Completed 12 weeks harvoni w/ SVR    Hyperlipidemia     Hypertension     PIC line (peripherally inserted central catheter) flush     Prophylactic immunotherapy     Proteinuria     PVD (peripheral vascular disease) 2017    RLE BKA CT 16 Extensive " "atherosclerotic disease of the aorta and mesenteric arteries.     Renal hypertension     Type 2 diabetes mellitus with diabetic neuropathy, with long-term current use of insulin 12/1/2016    Vitamin B12 deficiency        General Information Comments: Pt admitted w/ flank pain, FTT. Pt was oriented, not alert, had slurred speech. Pt noted to be noncompliant, states he doesn't follow any diet at home. RD encouraged following diabetic diet to control BG levels, prevent adverse consequences. Pt verbalized understanding. Pt diet just changed to ADA 2000 today. Before, pt reports eating yesterday w/ no subsequent n/v.  Nutrition Discharge Planning: Diabetic diet    Nutrition Risk Screen    Nutrition Risk Screen: no indicators present    Nutrition/Diet History    Do you have any cultural, spiritual, Rastafarian conflicts, given your current situation?: Bharti    Anthropometrics    Temp: 99.7 °F (37.6 °C)  Height Method: Stated  Height: 5' 11" (180.3 cm)  Height (inches): 71 in  Weight Method: Bed Scale  Weight: 93.1 kg (205 lb 3.2 oz)  Weight (lb): 205.2 lb  Ideal Body Weight (IBW), Male: 172 lb  % Ideal Body Weight, Male (lb): 119.3 lb  BMI (Calculated): 28.7  BMI Grade: 25 - 29.9 - overweight  Amputation %: 5.9  Total Amputation %: 5.9  Amputation Ideal Body Weight (IBW), Male (lb): 166.1 lb  Amputee BMI (kg/m2): 30.5 kg/m2  Anthropometrics Special Considerations: Amputations Present (Ideal Body Weight)    Lab/Procedures/Meds    Pertinent Labs Reviewed: reviewed  BMP  Lab Results   Component Value Date     (L) 09/19/2018    K 4.2 09/19/2018    CL 99 09/19/2018    CO2 17 (L) 09/19/2018    BUN 24 (H) 09/19/2018    CREATININE 1.8 (H) 09/19/2018    CALCIUM 8.9 09/19/2018    ANIONGAP 13 09/19/2018    ESTGFRAFRICA 45 (A) 09/19/2018    EGFRNONAA 39 (A) 09/19/2018     Lab Results   Component Value Date    CALCIUM 8.9 09/19/2018    PHOS 2.1 (L) 09/18/2018     Lab Results   Component Value Date    ALBUMIN 2.5 (L) " 09/18/2018     Recent Labs   Lab  09/19/18   1117   POCTGLUCOSE  248*     Lab Results   Component Value Date    HGBA1C 8.0 (H) 09/19/2018       Pertinent Medications Reviewed: reviewed  Scheduled Meds:   amLODIPine  2.5 mg Oral Daily    arformoterol  15 mcg Nebulization Q12H    aspirin  81 mg Oral Daily    budesonide  0.5 mg Nebulization Q12H    gabapentin  100 mg Oral TID    olanzapine zydis  5 mg Oral QHS    pantoprazole  40 mg Oral Daily    piperacillin-tazobactam (ZOSYN) IVPB  4.5 g Intravenous Q8H    predniSONE  5 mg Oral Daily    sodium bicarbonate  2,600 mg Oral BID    tacrolimus  1.5 mg Oral BID    [START ON 9/26/2018] vancomycin (VANCOCIN) IVPB  1,000 mg Intravenous Q48H     Continuous Infusions:  PRN Meds:.acetaminophen, dextrose 50%, dextrose 50%, glucagon (human recombinant), glucose, glucose, insulin aspart U-100, ondansetron, sodium chloride 0.9%, traMADol    Physical Findings/Assessment    Overall Physical Appearance: obese  Oral/Mouth Cavity: decay/carries, tooth/teeth missing  Skin: (incision to L foot; Matthew=15)    Estimated/Assessed Needs    Weight Used For Calorie Calculations: 93.1 kg (205 lb 4 oz)  Energy Calorie Requirements (kcal): 2086 kcal/day  Energy Need Method: [place](x1.2)  Protein Requirements:  gm/day  Weight Used For Protein Calculations: 93.1 kg (205 lb 4 oz)(x1-1.2)     Fluid Need Method: RDA Method(or per MD)  RDA Method (mL): 2086  CHO Requirement: 50% EEN      Nutrition Prescription Ordered    Current Diet Order: ADA 2000    Evaluation of Received Nutrient/Fluid Intake       % Intake of Estimated Energy Needs: STEVENSON  % Meal Intake: STEVENSON    Nutrition Risk      F/u x2 weekly    Assessment and Plan    Nutrition Problem  Altered nutrition-related lab values    Related to (etiology):   Endocrine dysfunction    Signs and Symptoms (as evidenced by):   Lab Results   Component Value Date    HGBA1C 8.0 (H) 09/19/2018     Interventions/Recommendations (treatment  strategy):  See above    Nutrition Diagnosis Status:   New         Monitor and Evaluation    Food and Nutrient Intake: energy intake, food and beverage intake  Food and Nutrient Adminstration: diet order  Knowledge/Beliefs/Attitudes: food and nutrition knowledge/skill  Anthropometric Measurements: weight  Biochemical Data, Medical Tests and Procedures: electrolyte and renal panel, glucose/endocrine profile  Nutrition-Focused Physical Findings: overall appearance, skin     Nutrition Follow-Up     Yes

## 2018-09-19 NOTE — PROGRESS NOTES
Clinical Pharmacy Progress Note: Vancomycin Dosing     Vancomycin Day #1 (<24h of therapy)     Estimated Creatinine Clearance: 47.7 mL/min (A) (based on SCr of 1.8 mg/dL (H)).   Kidney transplant patient. Baseline SCr is around 1.7.   Lab Results   Component Value Date    WBC 13.63 (H) 09/19/2018   WBC trend: (decreased x 1 day)         Tmax/24h: 100   Level: 11.8 mcg/mL @ 0520   Goal trough: 15-20 mcg/mL   Indication: osteomyelitis of left foot/gas gangrene       Cultures:   Blood on 9/18 NGTD   Historic culture from 7/9/18: C. Freundii, K. Oxytoca. Both susceptible to Zosyn, which patient is currently receiving.     Plan: Pharmacy will continue current vancomycin pulse dose approach; however, will change the trough goal to 15-20 mcg/mL in light of osteomyelitis. Patient will receive vancomycin 1250 mg IV vancomycin dose today. Patient will receive another dose when random level < 18 mcg/mL.   Please note: A vancomycin 1000 mg IV every 48 hours placeholder dose only has been entered.       Next level due:  Vancomycin random level due 09/20/18 @ 0600.     Thank you for allowing us to participate in this patient's care.    Radha Lopez, PharmD 9/19/2018 7:27 AM

## 2018-09-19 NOTE — ASSESSMENT & PLAN NOTE
Gunjan/zosyn  poditary consult   -Left toe amputation scheduled 9/19, possible left foot amputation might be required.

## 2018-09-19 NOTE — PROGRESS NOTES
[facility] Hospital Medicine  Progress Note    Patient Name: Lavelle Ladd  MRN: [id number]  Patient Class: IP- Inpatient   Admission Date: 9/18/2018  Length of Stay: 1 days  Attending Physician: Stephanie Mueller MD  Primary Care Provider: Rishabh Esteban MD        Subjective:     Principal Problem:Gas gangrene of foot    HPI:  HPI limited due to patient confusion and obtained from Er records.  Lavelle Ladd is a 63 y.o. male kidney transplant patient with hx of HTN, CAD, diastolic heart failure, Type 2 DM with diabetic neuropathy, ESRD, PVD, COPD who was brought to ED via EMS secondary to failure to thrive which has been present for an unknown amount of time. EMS reported that there was human and dog feces in patient's room and that patient does not want to take his medications. His CBG was 348. Patient with lower foot wound to left. Patient evalauted in ER. WBC 16.72, Na 127, Anion 18, Cr. 1.9, Trop 0.036, ,  CT renal:1. Negative for acute process involving the abdomen or pelvis. Negative for inflammatory changes. Normal appendix. Negative for renal stone disease or hydronephrosis, in this patient who has atrophic native kidneys and a right lower quadrant transplant kidney.2. Again seen is mild prominence of the transplant kidney pelvocaliceal system, without dilation of the ureter, likely a chronic finding.3. Mild distention of the gallbladder, which is otherwise normal, nonspecific finding.4. Fluid within the nondilated colon, which can be seen in patients with diarrhea or gastroenteritis. Clinical correlation is advised.5. Dependent atelectasis in the lung bases.6. Moderate sliding-type hiatal hernia with possible distal esophageal thickening. Does the patient have symptoms of esophagitis?7. Stable findings as noted above.  Chest Xray1. There is a mild amount of haziness in the lateral aspect of the base of the left hemithorax.  This is characteristic of atelectasis or subtle  "pneumonia.2. The left costophrenic angle is blunted.  This is characteristic of a tiny pleural effusion.3. There is a mild amount of levoconvex curvature of the thoracic spine.4. There is mild cardiomegaly. Xray left foot:1.  Progressive erosive changes with bone loss involving the 1st distal phalanx concerning for osteomyelitis.2.  Air bubbles within the left 5th toe, concerning for possible infectious process or gangrene.  Clinical correlation is advised.3.  Progressive soft tissue loss involving the 4th toe, which could also indicate gangrene.  Clinical correlation is advised. Hosptial medicine consulted. Patient admitted    Hospital Course:  9/19/18 - The patient was admitted to Inpatient for possible gas gangrene of the left foot under the care of Hospital Medicine. Dr. Wheeler, Podiatry, is following with plans to amputate the left 5th toe with consideration of left foot amputation. Patient is NPO for surgery. Vascular surgery consulted. Cr 1.8, BUN 24, eGFR 39. Nephrology consulted.      Interval History: Patient is complaining of pain and says he is "not good." He is upset and does not want to talk. He does not check his sugar levels at home. He sees Dr. Magaña, Podiatry, as outpatient. He says he lives with his mother right now.     Review of Systems   Unable to perform ROS: Other   Patient not willing to provide history or answer questions.    Objective:     Vital Signs (Most Recent):  Temp: 96.9 °F (36.1 °C) (09/19/18 0713)  Pulse: 100 (09/19/18 1205)  Resp: 18 (09/19/18 1205)  BP: (!) 152/67 (09/19/18 1205)  SpO2: (!) 93 % (09/19/18 1205) Vital Signs (24h Range):  Temp:  [96.9 °F (36.1 °C)-100 °F (37.8 °C)] 96.9 °F (36.1 °C)  Pulse:  [100-111] 100  Resp:  [14-47] 18  SpO2:  [92 %-100 %] 93 %  BP: (104-183)/() 152/67     Weight: 93.1 kg (205 lb 3.2 oz)  Body mass index is 28.62 kg/m².    Intake/Output Summary (Last 24 hours) at 9/19/2018 1207  Last data filed at 9/18/2018 1636  Gross per 24 hour "   Intake 2000 ml   Output --   Net 2000 ml      Physical Exam   Constitutional: He appears well-developed. He has a sickly appearance. He appears ill. No distress.   Elderly, unkept   HENT:   Head: Normocephalic and atraumatic.   Nose: Nose normal.   Eyes: Conjunctivae and EOM are normal. Pupils are equal, round, and reactive to light. No scleral icterus.   Neck: Normal range of motion. Neck supple. No tracheal deviation present.   Cardiovascular: Normal rate, regular rhythm, normal heart sounds and intact distal pulses.   No murmur heard.  Pulmonary/Chest: Effort normal and breath sounds normal. No stridor. No respiratory distress. He has no wheezes. He has no rales.   Abdominal: Soft. Bowel sounds are normal. He exhibits no distension. There is no tenderness. There is no guarding.   obese   Genitourinary:   Genitourinary Comments: See ua   Musculoskeletal: Normal range of motion. He exhibits edema (1+pitting to left foot.). He exhibits no deformity.   Amputation to right lower   Left foot wounds   Neurological: No cranial nerve deficit.   Drowsy, oriented to person and situation     Skin: Skin is warm and dry. Capillary refill takes 2 to 3 seconds. He is not diaphoretic.   Unkept, dusky 2nd toe, necrotic wound to 4th and 5th toes.  Malodorous wound.  See image of foot wound to left   Psychiatric:   Withdrawn, depressed    Nursing note and vitals reviewed.                Significant Labs:   Recent Lab Results       09/19/18  1117 09/19/18  1025 09/19/18  0916 09/19/18  0602 09/19/18  0520      Albumin          Alkaline Phosphatase          ALT          Amorphous, UA          Anion Gap     13     Appearance, UA          aPTT     33.1  Comment:  aPTT therapeutic range = 39-69 seconds     AST          Bacteria, UA          Baso #     0.01     Basophil%     0.1     Bilirubin (UA)          Total Bilirubin          Blood Culture, Routine          BNP          BUN, Bld     24     Calcium     8.9     Chloride     99      Cholesterol     149  Comment:  The National Cholesterol Education Program (NCEP) has set the  following guidelines (reference ranges) for Cholesterol:  Optimal.....................<200 mg/dL  Borderline High.............200-239 mg/dL  High........................> or = 240 mg/dL       CO2     17     Color, UA          CPK          CPK MB          Creatinine     1.8     Differential Method     Automated     eGFR if      45     eGFR if non      39  Comment:  Calculation used to obtain the estimated glomerular filtration  rate (eGFR) is the CKD-EPI equation.        Eos #     0.0     Eosinophil%     0.0     Estimated Avg Glucose     183     Free T4     1.10     Glucose     204     Glucose, UA          Gran # (ANC)     11.2     Gran%     82.2     HDL     29  Comment:  The National Cholesterol Education Program (NCEP) has set the  following guidelines (reference values) for HDL Cholesterol:  Low...............<40 mg/dL  Optimal...........>60 mg/dL       HDL/Chol Ratio     19.5     Hematocrit     38.6     Hemoglobin     12.7     Hemoglobin A1C     8.0  Comment:  ADA Screening Guidelines:  5.7-6.4%  Consistent with prediabetes  >or=6.5%  Consistent with diabetes  High levels of fetal hemoglobin interfere with the HbA1C  assay. Heterozygous hemoglobin variants (HbS, HgC, etc)do  not significantly interfere with this assay.   However, presence of multiple variants may affect accuracy.       Hyaline Casts, UA          Coumadin Monitoring INR     1.1  Comment:  Coumadin Therapy:  2.0 - 3.0 for INR for all indicators except mechanical heart valves  and antiphospholipid syndromes which should use 2.5 - 3.5.       Ketones, UA          Lactate, Massimo          LDL Cholesterol     100.2  Comment:  The National Cholesterol Education Program (NCEP) has set the  following guidelines (reference values) for LDL Cholesterol:  Optimal.......................<130 mg/dL  Borderline High...............130-159  mg/dL  High..........................160-189 mg/dL  Very High.....................>190 mg/dL       Leukocytes, UA          Lymph #     0.8     Lymph%     6.1     Magnesium          MB%          MCH     28.9     MCHC     32.9     MCV     88     Microscopic Comment          Mono #     1.6     Mono%     11.6     MPV     9.3     Nitrite, UA          Non-HDL Cholesterol     120  Comment:  Risk category and Non-HDL cholesterol goals:  Coronary heart disease (CHD)or equivalent (10-year risk of CHD >20%):  Non-HDL cholesterol goal     <130 mg/dL  Two or more CHD risk factors and 10-year risk of CHD <= 20%:  Non-HDL cholesterol goal     <160 mg/dL  0 to 1 CHD risk factor:  Non-HDL cholesterol goal     <190 mg/dL       Occult Blood UA          pH, UA          Phosphorus          Platelets     222     POCT Glucose 248 223 214 203      Potassium     4.2     Total Protein          Protein, UA          Protime     11.8     RBC     4.39     RBC, UA          RDW     13.4     Sodium     129     Specific [place], UA          Specimen UA          Total Cholesterol/HDL Ratio     5.1     Triglycerides     99  Comment:  The National Cholesterol Education Program (NCEP) has set the  following guidelines (reference values) for triglycerides:  Normal......................<150 mg/dL  Borderline High.............150-199 mg/dL  High........................200-499 mg/dL       Troponin I          TSH     0.238     Urobilinogen, UA          Vancomycin, Random     11.8     WBC, UA          WBC     13.63                 09/18/18  2200 09/18/18  1813 09/18/18  1812 09/18/18  1800 09/18/18  1706      Albumin          Alkaline Phosphatase          ALT          Amorphous, UA     Many     Anion Gap          Appearance, UA     Clear     aPTT          AST          Bacteria, UA     Few     Baso #          Basophil%          Bilirubin (UA)     2+  Comment:  Positive urine bilirubin is not confirmed. Correlate with   serum bilirubin and clinical  presentation.       Total Bilirubin          Blood Culture, Routine   No Growth to date[P] No Growth to date[P]      BNP          BUN, Bld          Calcium          Chloride          Cholesterol          CO2          Color, UA     Yellow     CPK          CPK MB          Creatinine          Differential Method          eGFR if           eGFR if non           Eos #          Eosinophil%          Estimated Avg Glucose          Free T4          Glucose          Glucose, UA     2+     Gran # (ANC)          Gran%          HDL          HDL/Chol Ratio          Hematocrit          Hemoglobin          Hemoglobin A1C          Hyaline Casts, UA     0     Coumadin Monitoring INR          Ketones, UA     2+     Lactate, Massimo  1.2  Comment:  Falsely low lactic acid results can be found in samples   containing >=13.0 mg/dL total bilirubin and/or >=3.5 mg/dL   direct bilirubin.          LDL Cholesterol          Leukocytes, UA     Negative     Lymph #          Lymph%          Magnesium          MB%          MCH          MCHC          MCV          Microscopic Comment     SEE COMMENT  Comment:  Other formed elements not mentioned in the report are not   present in the microscopic examination.        Mono #          Mono%          MPV          Nitrite, UA     Negative     Non-HDL Cholesterol          Occult Blood UA     3+     pH, UA     6.0     Phosphorus          Platelets          POCT Glucose 282         Potassium          Total Protein          Protein, UA     3+  Comment:  Recommend a 24 hour urine protein or a urine   protein/creatinine ratio if globulin induced proteinuria is  clinically suspected.       Protime          RBC          RBC, UA     0     RDW          Sodium          Specific Gravity, UA     >=1.030     Specimen UA     Urine, Catheterized     Total Cholesterol/HDL Ratio          Triglycerides          Troponin I          TSH          Urobilinogen, UA     Negative     Vancomycin, Random   "        WBC, UA     0     WBC                      09/18/18  1446      Albumin 2.5     Alkaline Phosphatase 95     ALT 13     Amorphous, UA      Anion Gap 18     Appearance, UA      aPTT      AST 19     Bacteria, UA      Baso # 0.01     Basophil% 0.1     Bilirubin (UA)      Total Bilirubin 0.9  Comment:  For infants and newborns, interpretation of results should be based  on gestational age, weight and in agreement with clinical  observations.  Premature Infant recommended reference ranges:  Up to 24 hours.............<8.0 mg/dL  Up to 48 hours............<12.0 mg/dL  3-5 days..................<15.0 mg/dL  6-29 days.................<15.0 mg/dL       Blood Culture, Routine        Comment:  Values of less than 100 pg/ml are consistent with non-CHF populations.     BUN, Bld 28     Calcium 9.2     Chloride 96     Cholesterol      CO2 13     Color, UA      CPK 98      98     CPK MB 1.0     Creatinine 1.9     Differential Method Automated     eGFR if  42     eGFR if non  36  Comment:  Calculation used to obtain the estimated glomerular filtration  rate (eGFR) is the CKD-EPI equation.        Eos # 0.0     Eosinophil% 0.0     Estimated Avg Glucose      Free T4 1.15     Glucose 329     Glucose, UA      Gran # (ANC) 14.1     Gran% 84.0     HDL      HDL/Chol Ratio      Hematocrit 40.6     Hemoglobin 13.4     Hemoglobin A1C      Hyaline Casts, UA      Coumadin Monitoring INR      Ketones, UA      Lactate, Massimo      LDL Cholesterol      Leukocytes, UA      Lymph # 0.8     Lymph% 5.0     Magnesium 1.9     MB% 1.0  Comment:  To be positive, the MB% must be greater than 5% AND the CK-MB  greater than 6.5 ng/mL. Values not in the reference interval,   but not qualifying as positive, should be considered "trace".       MCH 29.1     MCHC 33.0     MCV 88     Microscopic Comment      Mono # 1.8     Mono% 10.9     MPV 9.8     Nitrite, UA      Non-HDL Cholesterol      Occult Blood UA      pH, UA      " Phosphorus 2.1     Platelets 263     POCT Glucose      Potassium 4.6     Total Protein 7.1     Protein, UA      Protime      RBC 4.61     RBC, UA      RDW 13.5     Sodium 127     Specific Gravity, UA      Specimen UA      Total Cholesterol/HDL Ratio      Triglycerides      Troponin I 0.036  Comment:  The reference interval for Troponin I represents the 99th percentile   cutoff   for our facility and is consistent with 3rd generation assay   performance.       TSH 0.387     Urobilinogen, UA      Vancomycin, Random      WBC, UA      WBC 16.72         All pertinent labs within the past 24 hours have been reviewed.    Significant Imaging: I have reviewed all pertinent imaging results/findings within the past 24 hours.     Imaging Results          X-Ray Foot Complete Left (Final result)  Result time 09/18/18 19:50:43    Final result by Nicolas Sawyer MD (09/18/18 19:50:43)                 Impression:      1.  Progressive erosive changes with bone loss involving the 1st distal phalanx concerning for osteomyelitis.    2.  Air bubbles within the left 5th toe, concerning for possible infectious process or gangrene.  Clinical correlation is advised.    3.  Progressive soft tissue loss involving the 4th toe, which could also indicate gangrene.  Clinical correlation is advised.    4.  Persistent healing fracture of the 2nd proximal phalanx.  Other stable findings as noted above.      Electronically signed by: Nicolas Sawyer MD  Date:    09/18/2018  Time:    19:50             Narrative:    EXAMINATION:  XR FOOT COMPLETE 3 VIEW LEFT    CLINICAL HISTORY:  Pain in left foot.  Gangrene, not elsewhere classified    TECHNIQUE:  AP, lateral and oblique views of the left foot were performed.    COMPARISON:  August 16, 2018    FINDINGS:  There is been interval bone loss involving the distal 1st phalanx, with overlying soft tissue defect.  Healing fracture of the 2nd proximal phalanx again seen.  There has been progressive soft tissue  loss overlying the 4th toe and there are air bubbles in the 5th toe soft tissues.  No other bone loss is seen.    Plantar and Achilles calcaneal spurs.  Osteopenia.  Vascular calcifications.  Accessory ossicle adjacent to the navicular bone.                               CT Renal Stone Study ABD Pelvis WO (Final result)  Result time 09/18/18 18:31:38    Final result by Nicolas Sawyer MD (09/18/18 18:31:38)                 Impression:      1.  Negative for acute process involving the abdomen or pelvis.  Negative for inflammatory changes.  Normal appendix.  Negative for renal stone disease or hydronephrosis, in this patient who has atrophic native kidneys and a right lower quadrant transplant kidney.    2. Again seen is mild prominence of the transplant kidney pelvocaliceal system, without dilation of the ureter, likely a chronic finding.    3.  Mild distention of the gallbladder, which is otherwise normal, nonspecific finding.    4.  Fluid within the nondilated colon, which can be seen in patients with diarrhea or gastroenteritis.  Clinical correlation is advised.    5.  Dependent atelectasis in the lung bases.    6.  Moderate sliding-type hiatal hernia with possible distal esophageal thickening.  Does the patient have symptoms of esophagitis?    7.  Stable findings as noted above.    All CT scans at this facility are performed  using dose modulation techniques as appropriate to performed exam including the following:  automated exposure control; adjustment of mA and/or kV according to the patients size (this includes techniques or standardized protocols for targeted exams where dose is matched to indication/reason for exam: i.e. extremities or head);  iterative reconstruction technique.      Electronically signed by: Nicolas Sawyer MD  Date:    09/18/2018  Time:    18:31             Narrative:    EXAMINATION:  CT RENAL STONE STUDY ABD PELVIS WO    CLINICAL HISTORY:  Flank pain, stone disease  suspected;    TECHNIQUE:  Axial images through the abdomen and pelvis were obtained without the use of IV contrast.  Sagittal and coronal reconstructions are provided for review.  Oral contrast was not utilized.    COMPARISON:  December 11, 2016    FINDINGS:  LUNG BASES: Respiratory motion artifact is present on a number of images.  Subtle abnormalities could be overlooked.  Stable scarring or atelectasis in the inferior lingula and middle lobe.  Lung bases are otherwise clear.  Negative for pleural effusions.  There is a small amount of pericardial fluid.  Coronary artery calcifications.  Moderate size hiatal hernia with thickening of the distal esophagus.    ABDOMEN: The gallbladder is mildly distended.  No surrounding inflammatory changes or wall thickening is seen.  Bilateral perinephric stranding densities noted, with mild atrophy of both kidneys again seen.  Vascular calcifications within the spleen.  Again seen is mild prominence of the biliary tree measuring up to 8 mm.  The liver, spleen and gallbladder otherwise appear normal.  The pancreas is unremarkable.  Kidneys and adrenal glands are otherwise normal.  Negative for hydronephrosis or renal stone disease.  Significant vascular calcifications are present within the renal pelves, with bilateral renal sinus lipomatosis again seen.    There remains to be a transplant kidney in the right anterior hemipelvis.  Mild prominence of the renal collecting system is again seen.  Negative for perinephric stranding densities.  Negative for definite renal stones.    Negative for adenopathy, free fluid or inflammatory change noted within the abdomen or pelvis.  Vascular calcifications are present without aneurysmal changes, with the aorta measuring a maximum of 2.9 cm.    The small bowel appears normal. Normal appendix.  There is fluid within the proximal and mid colon, without dilation or surrounding inflammatory changes.    PELVIS: The urinary bladder is mildly thick  wall, a stable finding.  Prostate calcifications.  The   pelvic organs are breast of e-mail.  There are pelvic phleboliths.    No significant osseous abnormality is identified.  Negative for significant spinal canal stenosis. Similar degree of degenerative disc changes throughout the thoracic and lumbar spine, most significantly involving L3/L4.    Negative for groin adenopathy.    Postoperative changes involve the right pelvic abdominal wall from the transplant kidney placement.  The abdominal wall is intact.                               X-Ray Chest 1 View (Final result)  Result time 09/18/18 17:03:13    Final result by ANA CRISTINA Joseph Sr., MD (09/18/18 17:03:13)                 Impression:      1. There is a mild amount of haziness in the lateral aspect of the base of the left hemithorax.  This is characteristic of atelectasis or subtle pneumonia.  2. The left costophrenic angle is blunted.  This is characteristic of a tiny pleural effusion.  3. There is a mild amount of levoconvex curvature of the thoracic spine.  4. There is mild cardiomegaly.  .      Electronically signed by: Porter Joseph MD  Date:    09/18/2018  Time:    17:03             Narrative:    EXAMINATION:  XR CHEST 1 VIEW    CLINICAL HISTORY:  Adult failure to thrive    COMPARISON:  07/20/2018    FINDINGS:  The size of the heart is prominent.  There is a mild amount of haziness in the lateral aspect of the base of the left hemithorax.  The right lung is clear.  There is no pneumothorax.  The left costophrenic angle is blunted.  The right costophrenic angle is sharp.  There is a mild amount of levoconvex curvature of the thoracic spine.                                  Assessment/Plan:      * Gas gangrene of foot    Vanc/zosyn  Podiatry consult  Source of sepsis  -Toe amputation scheduled 9/19, possible foot amputation        Osteomyelitis of left foot    Vanc/zosyn  poditary consult   -Left toe amputation scheduled 9/19, possible left foot  amputation might be required.          Failure to thrive in adult    Social work, pt/ot, dieatry consults          Diabetes mellitus type 2, uncontrolled, with complications    Hgb A1c 8.0  ssi  Consider long acting pending course          Sepsis    Not severe sepsis. Patients kidney function baseline with kidney transplant and troponin elevation related to CKD.   Continue IV antx therapy with vanc and zosyn for suspected source of foot wound  Cultures pending  Monitor labs        PVD (peripheral vascular disease)    Vascular surgery consult.  Poditary consult for foot wound.           Chronic kidney disease (CKD), stage III (moderate)    Dr. Estrada with renal consulted due to patient with transplant.  He reports patient can stay here  Renal consulted   Monitor rfp           Essential hypertension    Hold bp meds at this time due to sepsis   Monitor trends and restart once need    -sepsis status improving.  -Restart BP meds.  -Monitor VS          VTE Risk Mitigation (From admission, onward)        Ordered     Place GOPI hose  Until discontinued      09/18/18 2020     Place sequential compression device  Until discontinued      09/18/18 2020     IP VTE HIGH RISK PATIENT  Once      09/18/18 2020              Albino Paredes NP  Department of Hospital Medicine   [facility]

## 2018-09-19 NOTE — ASSESSMENT & PLAN NOTE
Patient's kidney function is about at baseline.  Mild increase in creatinine in the presence of infection is not a concern at this time.  Patient is has being life-threatening infections and peripheral arterial disease remained require either partial foot or complete below-knee amputation.  At this point I would recommend continuation of Prograf and prednisone without any adjustment.  Prograf level is 5.2 which is acceptable at this time.  We will continue follow the patient very closely.

## 2018-09-19 NOTE — SUBJECTIVE & OBJECTIVE
Past Medical History:   Diagnosis Date    DINORAH (acute kidney injury) 2016    Arthritis     Chronic obstructive pulmonary disease 2016    Coronary artery disease involving native coronary artery of native heart without angina pectoris 2016     donor kidney transplant for DM 16     Induction with Thymo x3 and IV solumedrol to total 875mg  Kidney Biopsy  2016: 16 glomeruli, ACR type 1 AVR type 2, significant microcirculatory changes, c4d negative, No DSA, 5 to10% fibrosis. Treated with thymo x8 2016- no rejection      Diastolic heart failure     Encounter for blood transfusion     ESRD on RRT since 10/2013 10/29/2013    Biopsy proven diabetic nephropathy and lymphoplasmacytic interstitial infiltrate not c/w with AIN (ddx sjogrens or assoc with tamm-horsefall protein extravasation)     GERD (gastroesophageal reflux disease)     History of hepatitis C, s/p successful Rx w/ SVR12 - 2017    Completed 12 weeks harvoni w/ SVR    Hyperlipidemia     Hypertension     PIC line (peripherally inserted central catheter) flush     Prophylactic immunotherapy     Proteinuria     PVD (peripheral vascular disease) 2017    RLE BKA CT 16 Extensive atherosclerotic disease of the aorta and mesenteric arteries.     Renal hypertension     Type 2 diabetes mellitus with diabetic neuropathy, with long-term current use of insulin 2016    Vitamin B12 deficiency        Past Surgical History:   Procedure Laterality Date    AORTOGRAPHY WITH SERIALOGRAPHY N/A 2018    Procedure: LEFT LEG ANGIOGRAM;  Surgeon: Donal Mcdonald MD;  Location: [facility] CATH LAB;  Service: Vascular;  Laterality: N/A;    av bovine graft      Left UE    AV FISTULA PLACEMENT      left UE    BIOPSY Tranplanted Kidney N/A 8/15/2016    Performed [facility] 2ND FLR    BIOPSY, LIVER, TRANSJUGULAR APPROACH N/A 2014    Performed by [facility] Diagnostic Provider at [facility] CATH LAB     CARDIAC CATHETERIZATION  02/2015    INSERTION, CATHETER, VASCULAR N/A 10/31/2013    Performed by Ralph Mckeon MD at [facility] CATH LAB    IRRIGATION AND DEBRIDEMENT Left 7/9/2018    Performed by Katerina Magaña DPM at [facility] OR    KIDNEY TRANSPLANT  05/21/2016    LEFT LEG ANGIOGRAM N/A 6/14/2018    Performed by Donal Mcdonald MD at [facility] CATH LAB    LEG AMPUTATION THROUGH KNEE  2011    right LE, started as nail puncture leading to diabetic ulcer    TRANSPLANT-KIDNEY Right 5/21/2016    Performed by Ronny Bergeron MD at [facility] OR [address] [place] [address]       Review of patient's allergies indicates:  No Known Allergies  Current Facility-Administered Medications   Medication Frequency    acetaminophen tablet 650 mg Q4H PRN    amLODIPine tablet 2.5 mg Daily    arformoterol nebulizer solution 15 mcg Q12H    aspirin EC tablet 81 mg Daily    budesonide nebulizer solution 0.5 mg Q12H    dextrose 50% injection 12.5 g PRN    dextrose 50% injection 25 g PRN    gabapentin capsule 100 mg TID    glucagon (human recombinant) injection 1 mg PRN    glucose chewable tablet 16 g PRN    glucose chewable tablet 24 g PRN    insulin aspart U-100 pen 0-5 Units QID (AC + HS) PRN    olanzapine zydis disintegrating tablet 5 mg QHS    ondansetron injection 4 mg Q8H PRN    pantoprazole EC tablet 40 mg Daily    piperacillin-tazobactam 4.5 g in dextrose 5 % 100 mL IVPB (ready to mix system) Q8H    predniSONE tablet 5 mg Daily    sodium bicarbonate tablet 2,600 mg BID    sodium chloride 0.9% flush 5 mL PRN    tacrolimus capsule 1.5 mg BID    traMADol tablet 50 mg Q8H PRN    [START ON 9/26/2018] vancomycin in dextrose 5 % 1 gram/250 mL PLACEHOLDER DOSE Q48H     Family History     Problem Relation (Age of Onset)    Cancer Father    Diabetes Father    Heart failure Father, Mother    Stroke Father        Tobacco Use    Smoking status: Former Smoker     Packs/day: 1.00     Years: 40.00     Pack years: 40.00     Last attempt to quit: 1/11/2013      Years since quittin.6    Smokeless tobacco: Never Used   Substance and Sexual Activity    Alcohol use: Yes     Alcohol/week: 3.6 oz     Types: 6 Cans of beer per week     Comment: 6 beers/week    Drug use: No    Sexual activity: No     Review of Systems   Unable to perform ROS: Acuity of condition   Constitutional: Negative.  Negative for activity change, appetite change, chills, diaphoresis, fatigue and fever.        At times patient is confused.  Patient was able to recognize me.  Patient knows that he is in the hospital.  At times he gives irrelevant answers.  At times patient is not reliable.  Denies any chest pain or shortness of Breath at this time.  Does complain of left foot with pain and numbness issues.  He does feel like he does not feel well overall accurate review of system is not obtainable   HENT: Negative.  Negative for congestion and trouble swallowing.    Eyes: Negative.    Respiratory: Negative.  Negative for cough, chest tightness, shortness of breath and wheezing.    Cardiovascular: Negative.  Negative for chest pain, palpitations and leg swelling.   Gastrointestinal: Negative.  Negative for abdominal distention, abdominal pain, nausea and vomiting.   Genitourinary: Negative.  Negative for decreased urine volume, difficulty urinating, dysuria, enuresis, flank pain, frequency, hematuria, penile swelling, scrotal swelling and urgency.   Musculoskeletal: Negative.  Negative for arthralgias, back pain, joint swelling and neck pain.   Skin: Negative for rash.   Neurological: Negative.  Negative for tremors, seizures and headaches.   Psychiatric/Behavioral: Negative.  Negative for confusion and sleep disturbance. The patient is not nervous/anxious.      Objective:     Vital Signs (Most Recent):  Temp: 96.9 °F (36.1 °C) (18 0713)  Pulse: 100 (18 1205)  Resp: 18 (18 1205)  BP: (!) 152/67 (18 1205)  SpO2: (!) 93 % (18 1205)  O2 Device (Oxygen Therapy): room air  (09/19/18 0713) Vital Signs (24h Range):  Temp:  [96.9 °F (36.1 °C)-100 °F (37.8 °C)] 96.9 °F (36.1 °C)  Pulse:  [100-111] 100  Resp:  [14-47] 18  SpO2:  [92 %-100 %] 93 %  BP: (104-183)/() 152/67     Weight: 93.1 kg (205 lb 3.2 oz) (09/18/18 1427)  Body mass index is 28.62 kg/m².  Body surface area is 2.16 meters squared.    I/O last 3 completed shifts:  In: 2000 [I.V.:1000; IV Piggyback:1000]  Out: -     Physical Exam   Constitutional: He appears well-developed. He has a sickly appearance. He appears ill. No distress.   Elderly, unkept   HENT:   Head: Normocephalic and atraumatic.   Nose: Nose normal.   Eyes: Conjunctivae and EOM are normal. Pupils are equal, round, and reactive to light. No scleral icterus.   Neck: Normal range of motion. Neck supple. No tracheal deviation present.   Cardiovascular: Normal rate, regular rhythm, normal heart sounds and intact distal pulses.   No murmur heard.  Pulmonary/Chest: Effort normal and breath sounds normal. No stridor. No respiratory distress. He has no wheezes. He has no rales.   Abdominal: Soft. Bowel sounds are normal. He exhibits no distension. There is no tenderness. There is no guarding.   obese   Genitourinary:   Genitourinary Comments: See ua   Musculoskeletal: Normal range of motion. He exhibits edema (1+pitting to left foot.). He exhibits no deformity.   Amputation to right lower   Left foot wounds   Neurological: No cranial nerve deficit.   Drowsy, oriented to person and situation     Skin: Skin is warm and dry. Capillary refill takes 2 to 3 seconds. He is not diaphoretic.   Unkept, dusky 2nd toe, necrotic wound to 4th and 5th toes.  See image of foot wound to left   Psychiatric:   Withdrawn, depressed    Nursing note and vitals reviewed.      Significant Labs:  All labs within the past 24 hours have been reviewed.    Significant Imaging:  Labs: Reviewed  US: Reviewed

## 2018-09-19 NOTE — CONSULTS
[facility]  Nephrology  Consult Note        Patient Name: Lavelle Ladd  MRN: [id number]  Admission Date: 2018  Hospital Length of Stay: 1 days  Attending Provider: Stephanie Mueller MD   Primary Care Physician: Rishabh Esteban MD  Principal Problem:Gas gangrene of foot    Consults  Subjective:     HPI: Patient is a 63-year-old male with type 1 diabetes with multiple complications including peripheral neuropathy and peripheral arterial disease.  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  Patient has been struggling with left foot wound and peripheral arterial disease along with osteomyelitis and gangrene of the left foot.  Patient is admitted with severe infection of the left foot with the possibility of needing surgical intervention.  Patient's baseline creatinine has been ranging between 1.6 and 1.9.  Patient's creatinine on admission is 1.9 and today is 1.8.  Patient is off and on confused and in bed social situation at home he was found to have feces all over himself.  Patient has an elderly mother at home as well with multiple social issues which are being addressed at this time.  Patient seen and examined in the hospital room bedside for renal transplant issues.  Patient has been on Prograf and prednisone.  No CellCept has been given to her at this time.    Past Medical History:   Diagnosis Date    DINORAH (acute kidney injury) 2016    Arthritis     Chronic obstructive pulmonary disease 2016    Coronary artery disease involving native coronary artery of native heart without angina pectoris 2016     donor kidney transplant for DM 16     Induction with Thymo x3 and IV solumedrol to total 875mg  Kidney Biopsy  2016: 16 glomeruli, ACR type 1 AVR type 2, significant microcirculatory changes, c4d negative, No DSA, 5 to10% fibrosis. Treated with thymo x8 2016- no rejection      Diastolic heart failure     Encounter for blood transfusion     ESRD on RRT since  10/2013 10/29/2013    Biopsy proven diabetic nephropathy and lymphoplasmacytic interstitial infiltrate not c/w with AIN (ddx sjogrens or assoc with tamm-horsefall protein extravasation)     GERD (gastroesophageal reflux disease)     History of hepatitis C, s/p successful Rx w/ SVR12 - 4/2017 4/5/2017    Completed 12 weeks harvoni w/ SVR    Hyperlipidemia     Hypertension     PIC line (peripherally inserted central catheter) flush     Prophylactic immunotherapy     Proteinuria     PVD (peripheral vascular disease) 6/26/2017    RLE BKA CT 12/11/16 Extensive atherosclerotic disease of the aorta and mesenteric arteries.     Renal hypertension     Type 2 diabetes mellitus with diabetic neuropathy, with long-term current use of insulin 12/1/2016    Vitamin B12 deficiency        Past Surgical History:   Procedure Laterality Date    AORTOGRAPHY WITH SERIALOGRAPHY N/A 6/14/2018    Procedure: LEFT LEG ANGIOGRAM;  Surgeon: Donal Mcdonald MD;  Location: [facility] CATH LAB;  Service: Vascular;  Laterality: N/A;    av bovine graft      Left UE    AV FISTULA PLACEMENT      left UE    BIOPSY Tranplanted Kidney N/A 8/15/2016    Performed by Paulette Hanna MD at [facility] OR 2ND FLR    BIOPSY, LIVER, TRANSJUGULAR APPROACH N/A 4/24/2014    Performed by [facility] Provider at [facility] CATH LAB    CARDIAC CATHETERIZATION  02/2015    INSERTION, CATHETER, VASCULAR N/A 10/31/2013    Performed by Ralph Mckeon MD at [facility] CATH LAB    IRRIGATION AND DEBRIDEMENT Left 7/9/2018    Performed by Katerina Magaña DPM at [facility] OR    KIDNEY TRANSPLANT  05/21/2016    LEFT LEG ANGIOGRAM N/A 6/14/2018    Performed by Donal Mcdonald MD at [facility] CATH LAB    LEG AMPUTATION THROUGH KNEE  2011    right LE, started as nail puncture leading to diabetic ulcer    TRANSPLANT-KIDNEY Right 5/21/2016    Performed by Ronny Bergeron MD at [facility] OR 2ND FLR       Review of patient's allergies indicates:  No Known Allergies  Current Facility-Administered  Medications   Medication Frequency    acetaminophen tablet 650 mg Q4H PRN    amLODIPine tablet 2.5 mg Daily    arformoterol nebulizer solution 15 mcg Q12H    aspirin EC tablet 81 mg Daily    budesonide nebulizer solution 0.5 mg Q12H    dextrose 50% injection 12.5 g PRN    dextrose 50% injection 25 g PRN    gabapentin capsule 100 mg TID    glucagon (human recombinant) injection 1 mg PRN    glucose chewable tablet 16 g PRN    glucose chewable tablet 24 g PRN    insulin aspart U-100 pen 0-5 Units QID (AC + HS) PRN    olanzapine zydis disintegrating tablet 5 mg QHS    ondansetron injection 4 mg Q8H PRN    pantoprazole EC tablet 40 mg Daily    piperacillin-tazobactam 4.5 g in dextrose 5 % 100 mL IVPB (ready to mix system) Q8H    predniSONE tablet 5 mg Daily    sodium bicarbonate tablet 2,600 mg BID    sodium chloride 0.9% flush 5 mL PRN    tacrolimus capsule 1.5 mg BID    traMADol tablet 50 mg Q8H PRN    [START ON 2018] vancomycin in dextrose 5 % 1 gram/250 mL PLACEHOLDER DOSE Q48H     Family History     Problem Relation (Age of Onset)    Cancer Father    Diabetes Father    Heart failure Father, Mother    Stroke Father        Tobacco Use    Smoking status: Former Smoker     Packs/day: 1.00     Years: 40.00     Pack years: 40.00     Last attempt to quit: 2013     Years since quittin.6    Smokeless tobacco: Never Used   Substance and Sexual Activity    Alcohol use: Yes     Alcohol/week: 3.6 oz     Types: 6 Cans of beer per week     Comment: 6 beers/week    Drug use: No    Sexual activity: No     Review of Systems   Unable to perform ROS: Acuity of condition   Constitutional: Negative.  Negative for activity change, appetite change, chills, diaphoresis, fatigue and fever.        At times patient is confused.  Patient was able to recognize me.  Patient knows that he is in the hospital.  At times he gives irrelevant answers.  At times patient is not reliable.  Denies any chest pain or  shortness of Breath at this time.  Does complain of left foot with pain and numbness issues.  He does feel like he does not feel well overall accurate review of system is not obtainable   HENT: Negative.  Negative for congestion and trouble swallowing.    Eyes: Negative.    Respiratory: Negative.  Negative for cough, chest tightness, shortness of breath and wheezing.    Cardiovascular: Negative.  Negative for chest pain, palpitations and leg swelling.   Gastrointestinal: Negative.  Negative for abdominal distention, abdominal pain, nausea and vomiting.   Genitourinary: Negative.  Negative for decreased urine volume, difficulty urinating, dysuria, enuresis, flank pain, frequency, hematuria, penile swelling, scrotal swelling and urgency.   Musculoskeletal: Negative.  Negative for arthralgias, back pain, joint swelling and neck pain.   Skin: Negative for rash.   Neurological: Negative.  Negative for tremors, seizures and headaches.   Psychiatric/Behavioral: Negative.  Negative for confusion and sleep disturbance. The patient is not nervous/anxious.      Objective:     Vital Signs (Most Recent):  Temp: 96.9 °F (36.1 °C) (09/19/18 0713)  Pulse: 100 (09/19/18 1205)  Resp: 18 (09/19/18 1205)  BP: (!) 152/67 (09/19/18 1205)  SpO2: (!) 93 % (09/19/18 1205)  O2 Device (Oxygen Therapy): room air (09/19/18 0713) Vital Signs (24h Range):  Temp:  [96.9 °F (36.1 °C)-100 °F (37.8 °C)] 96.9 °F (36.1 °C)  Pulse:  [100-111] 100  Resp:  [14-47] 18  SpO2:  [92 %-100 %] 93 %  BP: (104-183)/() 152/67     Weight: 93.1 kg (205 lb 3.2 oz) (09/18/18 1427)  Body mass index is 28.62 kg/m².  Body surface area is 2.16 meters squared.    I/O last 3 completed shifts:  In: 2000 [I.V.:1000; IV Piggyback:1000]  Out: -     Physical Exam   Constitutional: He appears well-developed. He has a sickly appearance. He appears ill. No distress.   Elderly, unkept   HENT:   Head: Normocephalic and atraumatic.   Nose: Nose normal.   Eyes: Conjunctivae and  EOM are normal. Pupils are equal, round, and reactive to light. No scleral icterus.   Neck: Normal range of motion. Neck supple. No tracheal deviation present.   Cardiovascular: Normal rate, regular rhythm, normal heart sounds and intact distal pulses.   No murmur heard.  Pulmonary/Chest: Effort normal and breath sounds normal. No stridor. No respiratory distress. He has no wheezes. He has no rales.   Abdominal: Soft. Bowel sounds are normal. He exhibits no distension. There is no tenderness. There is no guarding.   obese   Genitourinary:   Genitourinary Comments: See ua   Musculoskeletal: Normal range of motion. He exhibits edema (1+pitting to left foot.). He exhibits no deformity.   Amputation to right lower   Left foot wounds   Neurological: No cranial nerve deficit.   Drowsy, oriented to person and situation     Skin: Skin is warm and dry. Capillary refill takes 2 to 3 seconds. He is not diaphoretic.   Unkept, dusky 2nd toe, necrotic wound to 4th and 5th toes.  See image of foot wound to left   Psychiatric:   Withdrawn, depressed    Nursing note and vitals reviewed.      Significant Labs:  All labs within the past 24 hours have been reviewed.    Significant Imaging:  Labs: Reviewed  US: Reviewed    Assessment/Plan:      donor kidney transplant for DM 16    Patient's kidney function is about at baseline.  Mild increase in creatinine in the presence of infection is not a concern at this time.  Patient is has being life-threatening infections and peripheral arterial disease remained require either partial foot or complete below-knee amputation.  At this point I would recommend continuation of Prograf and prednisone without any adjustment.  Prograf level is 5.2 which is acceptable at this time.  We will continue follow the patient very closely.            Thank you for your consult.     Avel Estrada MD   Nephrology  [facility]

## 2018-09-19 NOTE — ASSESSMENT & PLAN NOTE
Not severe sepsis. Patients kidney function baseline with kidney transplant and troponin elevation related to CKD.   Continue IV antx therapy with vanc and zosyn for suspected source of foot wound  Cultures pending  Monitor labs

## 2018-09-19 NOTE — ASSESSMENT & PLAN NOTE
Hold bp meds at this time due to sepsis   Monitor trends and restart once need    -sepsis status improving.  -Restart BP meds.  -Monitor VS

## 2018-09-19 NOTE — H&P
[facility] Hospital Medicine  History & Physical    Patient Name: Lavelle Ladd  MRN: [id number]  Admission Date: 9/18/2018  Attending Physician: Rodrick Dominguez MD   Primary Care Provider: Rishabh Esteban MD         Patient information was obtained from past medical records and ER records.     Subjective:     Principal Problem:Gas gangrene of foot    Chief Complaint:   Chief Complaint   Patient presents with    Failure To Thrive     pts room was covered in human and dog feces, pt refusing to take his medications, diabetic, glucose 348 by EMS        HPI: HPI limited due to patient confusion and obtained from Er records.  Lavelle Ladd is a 63 y.o. male kidney transplant patient with hx of HTN, CAD, diastolic heart failure, Type 2 DM with diabetic neuropathy, ESRD, PVD, COPD who was brought to ED via EMS secondary to failure to thrive which has been present for an unknown amount of time. EMS reported that there was human and dog feces in patient's room and that patient does not want to take his medications. His CBG was 348. Patient with lower foot wound to left. Patient evalauted in ER. WBC 16.72, Na 127, Anion 18, Cr. 1.9, Trop 0.036, ,  CT renal:1. Negative for acute process involving the abdomen or pelvis. Negative for inflammatory changes. Normal appendix. Negative for renal stone disease or hydronephrosis, in this patient who has atrophic native kidneys and a right lower quadrant transplant kidney.2. Again seen is mild prominence of the transplant kidney pelvocaliceal system, without dilation of the ureter, likely a chronic finding.3. Mild distention of the gallbladder, which is otherwise normal, nonspecific finding.4. Fluid within the nondilated colon, which can be seen in patients with diarrhea or gastroenteritis. Clinical correlation is advised.5. Dependent atelectasis in the lung bases.6. Moderate sliding-type hiatal hernia with possible distal esophageal thickening. Does the  patient have symptoms of esophagitis?7. Stable findings as noted above.  Chest Xray1. There is a mild amount of haziness in the lateral aspect of the base of the left hemithorax.  This is characteristic of atelectasis or subtle pneumonia.2. The left costophrenic angle is blunted.  This is characteristic of a tiny pleural effusion.3. There is a mild amount of levoconvex curvature of the thoracic spine.4. There is mild cardiomegaly. Xray left foot:1.  Progressive erosive changes with bone loss involving the 1st distal phalanx concerning for osteomyelitis.2.  Air bubbles within the left 5th toe, concerning for possible infectious process or gangrene.  Clinical correlation is advised.3.  Progressive soft tissue loss involving the 4th toe, which could also indicate gangrene.  Clinical correlation is advised. Hosptial medicine consulted. Patient admitted    Past Medical History:   Diagnosis Date    DINORAH (acute kidney injury) 2016    Arthritis     Chronic obstructive pulmonary disease 2016    Coronary artery disease involving native coronary artery of native heart without angina pectoris 2016     donor kidney transplant for DM 16     Induction with Thymo x3 and IV solumedrol to total 875mg  Kidney Biopsy  2016: 16 glomeruli, ACR type 1 AVR type 2, significant microcirculatory changes, c4d negative, No DSA, 5 to10% fibrosis. Treated with thymo x8 2016- no rejection      Diastolic heart failure     Encounter for blood transfusion     ESRD on RRT since 10/2013 10/29/2013    Biopsy proven diabetic nephropathy and lymphoplasmacytic interstitial infiltrate not c/w with AIN (ddx sjogrens or assoc with tamm-horsefall protein extravasation)     GERD (gastroesophageal reflux disease)     History of hepatitis C, s/p successful Rx w/ SVR12 - 2017    Completed 12 weeks harvoni w/ SVR    Hyperlipidemia     Hypertension     PIC line (peripherally inserted central catheter) flush  "    Prophylactic immunotherapy     Proteinuria     PVD (peripheral vascular disease) 6/26/2017    RLE BKA CT 12/11/16 Extensive atherosclerotic disease of the aorta and mesenteric arteries.     Renal hypertension     Type 2 diabetes mellitus with diabetic neuropathy, with long-term current use of insulin 12/1/2016    Vitamin B12 deficiency        Past Surgical History:   Procedure Laterality Date    AORTOGRAPHY WITH SERIALOGRAPHY N/A 6/14/2018    Procedure: LEFT LEG ANGIOGRAM;  Surgeon: Donal Mcdonald MD;  Location: [facility] CATH LAB;  Service: Vascular;  Laterality: N/A;    av bovine graft      Left UE    AV FISTULA PLACEMENT      left UE    BIOPSY Tranplanted Kidney N/A 8/15/2016    Performed by Paulette Hanna MD at [facility] OR [address] [place] [address]    BIOPSY, LIVER, TRANSJUGULAR APPROACH N/A 4/24/2014    Performed by [facility] Diagnostic Provider at [facility] CATH LAB    CARDIAC CATHETERIZATION  02/2015    INSERTION, CATHETER, VASCULAR N/A 10/31/2013    Performed by Ralph Mckeon MD at [facility] CATH LAB    IRRIGATION AND DEBRIDEMENT Left 7/9/2018    Performed by Katerina Magaña DPM at [facility] OR    KIDNEY TRANSPLANT  05/21/2016    LEFT LEG ANGIOGRAM N/A 6/14/2018    Performed by Donal Mcdonald MD at [facility] CATH LAB    LEG AMPUTATION THROUGH KNEE  2011    right LE, started as nail puncture leading to diabetic ulcer    TRANSPLANT-KIDNEY Right 5/21/2016    Performed by Ronny Bergeron MD at [facility] OR [address] [place] [address]       Review of patient's allergies indicates:  No Known Allergies    No current facility-administered medications on file prior to encounter.      Current Outpatient Medications on File Prior to Encounter   Medication Sig    amLODIPine (NORVASC) 2.5 MG tablet Take 1 tablet (2.5 mg total) by mouth once daily.    aspirin (ECOTRIN) 81 MG EC tablet Take 1 tablet (81 mg total) by mouth once daily.    BD INSULIN PEN NEEDLE UF SHORT 31 gauge x 5/16" Ndle     BD INSULIN SYRINGE ULTRA-FINE 1 mL 31 gauge x 5/16 Syrg USE ONE AS DIRECTED FOUR " "TIMES DAILY WITH MEALS AND AT BEDTIME    blood sugar diagnostic Strp 1 each by Misc.(Non-Drug; Combo Route) route 3 (three) times daily.    blood-glucose meter kit Use as instructed    budesonide-formoterol 160-4.5 mcg (SYMBICORT) 160-4.5 mcg/actuation HFAA Inhale 2 puffs into the lungs every 12 (twelve) hours. Wash out mouth after using    ergocalciferol (ERGOCALCIFEROL) 50,000 unit Cap Take 1 capsule (50,000 Units total) by mouth every 7 days. Take on Mondays    gabapentin (NEURONTIN) 300 MG capsule     HYDROcodone-acetaminophen (NORCO)  mg per tablet Take 1 tablet by mouth every 6 (six) hours as needed.    inhalation device (BREATHERITE VALVED MDI CHAMBER) Use as directed for inhalation.    insulin NPH (NOVOLIN N) 100 unit/mL injection Take 25 units subq with breakfast and 15 units at bedtime    lancets Misc 1 each by Misc.(Non-Drug; Combo Route) route 3 (three) times daily.    NOVOLIN R REGULAR U-100 INSULN 100 unit/mL injection INJECT 6 UNITS WITH BREAKFAST AND 8 UNITS WITH DINNER, SUBCUTANEOUSLY 30 MIN BEFORE MEAL.    ondansetron (ZOFRAN-ODT) 4 MG TbDL Take 1 tablet (4 mg total) by mouth every 8 (eight) hours as needed.    pen needle, diabetic (EASY COMFORT PEN NEEDLES) 32 gauge x 5/32" Ndle Inject 1 each into the skin 3 (three) times daily.    pen needle, diabetic 31 gauge x 1/4" Ndle 1 each by Misc.(Non-Drug; Combo Route) route 4 (four) times daily.    predniSONE (DELTASONE) 5 MG tablet Take 1 tablet (5 mg total) by mouth once daily.    sodium bicarbonate 650 MG tablet Take 4 tablets (2,600 mg total) by mouth 2 (two) times daily.    tacrolimus (PROGRAF) 0.5 MG Cap Take 3 capsules (1.5 mg total) by mouth every 12 (twelve) hours. Z94.0 Kidney Transplant    zolpidem (AMBIEN) 5 MG Tab Take 2 tablets (10 mg total) by mouth nightly as needed.     Family History     Problem Relation (Age of Onset)    Cancer Father    Diabetes Father    Heart failure Father, Mother    Stroke Father    "     Tobacco Use    Smoking status: Former Smoker     Packs/day: 1.00     Years: 40.00     Pack years: 40.00     Last attempt to quit: 2013     Years since quittin.6    Smokeless tobacco: Never Used   Substance and Sexual Activity    Alcohol use: Yes     Alcohol/week: 3.6 oz     Types: 6 Cans of beer per week     Comment: 6 beers/week    Drug use: No    Sexual activity: No     *I have personally reviewed the patients allergies, medical, surgical, family and social history as listed above. Unable to verify with patient .    Review of Systems   Unable to perform ROS: Mental status change     Objective:     Vital Signs (Most Recent):  Temp: 98.6 °F (37 °C) (18 041)  Pulse: 108 (18)  Resp: 18 (18)  BP: (!) 174/93 (18)  SpO2: (!) 92 % (18) Vital Signs (24h Range):  Temp:  [98.3 °F (36.8 °C)-100 °F (37.8 °C)] 98.6 °F (37 °C)  Pulse:  [100-110] 108  Resp:  [16-47] 18  SpO2:  [92 %-100 %] 92 %  BP: (104-183)/() 174/93     Weight: 93.1 kg (205 lb 3.2 oz)  Body mass index is 28.62 kg/m².    Physical Exam   Constitutional: He appears well-developed. He has a sickly appearance. He appears ill. No distress.   Elderly, unkept   HENT:   Head: Normocephalic and atraumatic.   Nose: Nose normal.   Eyes: Conjunctivae and EOM are normal. Pupils are equal, round, and reactive to light. No scleral icterus.   Neck: Normal range of motion. Neck supple. No tracheal deviation present.   Cardiovascular: Normal rate, regular rhythm, normal heart sounds and intact distal pulses.   No murmur heard.  Pulmonary/Chest: Effort normal and breath sounds normal. No stridor. No respiratory distress. He has no wheezes. He has no rales.   Abdominal: Soft. Bowel sounds are normal. He exhibits no distension. There is no tenderness. There is no guarding.   obese   Genitourinary:   Genitourinary Comments: See ua   Musculoskeletal: Normal range of motion. He exhibits no edema or deformity.    Amputation to right lower   Left foot wounds   Neurological: No cranial nerve deficit.   Drowsy, oriented to person and situation     Skin: Skin is warm and dry. Capillary refill takes 2 to 3 seconds. No rash noted. He is not diaphoretic.   Unkept,   See image of foot wound to left   Psychiatric:   Withdrawn, depressed    Nursing note and vitals reviewed.        CRANIAL NERVES     CN III, IV, VI   Pupils are equal, round, and reactive to light.  Extraocular motions are normal.                Significant Labs: All pertinent labs within the past 24 hours have been reviewed.  Results for orders placed or performed during the hospital encounter of 09/18/18   CBC auto differential   Result Value Ref Range    WBC 16.72 (H) 3.90 - 12.70 K/uL    RBC 4.61 4.60 - 6.20 M/uL    Hemoglobin 13.4 (L) 14.0 - 18.0 g/dL    Hematocrit 40.6 40.0 - 54.0 %    MCV 88 82 - 98 fL    MCH 29.1 27.0 - 31.0 pg    MCHC 33.0 32.0 - 36.0 g/dL    RDW 13.5 11.5 - 14.5 %    Platelets 263 150 - 350 K/uL    MPV 9.8 9.2 - 12.9 fL    Gran # (ANC) 14.1 (H) 1.8 - 7.7 K/uL    Lymph # 0.8 (L) 1.0 - 4.8 K/uL    Mono # 1.8 (H) 0.3 - 1.0 K/uL    Eos # 0.0 0.0 - 0.5 K/uL    Baso # 0.01 0.00 - 0.20 K/uL    Gran% 84.0 (H) 38.0 - 73.0 %    Lymph% 5.0 (L) 18.0 - 48.0 %    Mono% 10.9 4.0 - 15.0 %    Eosinophil% 0.0 0.0 - 8.0 %    Basophil% 0.1 0.0 - 1.9 %    Differential Method Automated    Urinalysis   Result Value Ref Range    Specimen UA Urine, Catheterized     Color, UA Yellow Yellow, Straw, Jessa    Appearance, UA Clear Clear    pH, UA 6.0 5.0 - 8.0    Specific Gravity, UA >=1.030 (A) 1.005 - 1.030    Protein, UA 3+ (A) Negative    Glucose, UA 2+ (A) Negative    Ketones, UA 2+ (A) Negative    Bilirubin (UA) 2+ (A) Negative    Occult Blood UA 3+ (A) Negative    Nitrite, UA Negative Negative    Urobilinogen, UA Negative <2.0 EU/dL    Leukocytes, UA Negative Negative   TSH   Result Value Ref Range    TSH 0.387 (L) 0.400 - 4.000 uIU/mL   CK   Result Value Ref  Range    CPK 98 20 - 200 U/L   CK-MB   Result Value Ref Range    CPK 98 20 - 200 U/L    CPK MB 1.0 0.1 - 6.5 ng/mL    MB% 1.0 0.0 - 5.0 %   Troponin I   Result Value Ref Range    Troponin I 0.036 (H) 0.000 - 0.026 ng/mL   Brain natriuretic peptide   Result Value Ref Range     (H) 0 - 99 pg/mL   Comprehensive metabolic panel   Result Value Ref Range    Sodium 127 (L) 136 - 145 mmol/L    Potassium 4.6 3.5 - 5.1 mmol/L    Chloride 96 95 - 110 mmol/L    CO2 13 (L) 23 - 29 mmol/L    Glucose 329 (H) 70 - 110 mg/dL    BUN, Bld 28 (H) 8 - 23 mg/dL    Creatinine 1.9 (H) 0.5 - 1.4 mg/dL    Calcium 9.2 8.7 - 10.5 mg/dL    Total Protein 7.1 6.0 - 8.4 g/dL    Albumin 2.5 (L) 3.5 - 5.2 g/dL    Total Bilirubin 0.9 0.1 - 1.0 mg/dL    Alkaline Phosphatase 95 55 - 135 U/L    AST 19 10 - 40 U/L    ALT 13 10 - 44 U/L    Anion Gap 18 (H) 8 - 16 mmol/L    eGFR if African American 42 (A) >60 mL/min/1.73 m^2    eGFR if non African American 36 (A) >60 mL/min/1.73 m^2   T4, free   Result Value Ref Range    Free T4 1.15 0.71 - 1.51 ng/dL   Urinalysis Microscopic   Result Value Ref Range    RBC, UA 0 0 - 4 /hpf    WBC, UA 0 0 - 5 /hpf    Bacteria, UA Few (A) None-Occ /hpf    Hyaline Casts, UA 0 0-1/lpf /lpf    Amorphous, UA Many (A) None-Moderate    Microscopic Comment SEE COMMENT    Lactic acid, plasma   Result Value Ref Range    Lactate (Lactic Acid) 1.2 0.5 - 2.2 mmol/L   Phosphorus   Result Value Ref Range    Phosphorus 2.1 (L) 2.7 - 4.5 mg/dL   Magnesium   Result Value Ref Range    Magnesium 1.9 1.6 - 2.6 mg/dL   CBC with Automated Differential   Result Value Ref Range    WBC 13.63 (H) 3.90 - 12.70 K/uL    RBC 4.39 (L) 4.60 - 6.20 M/uL    Hemoglobin 12.7 (L) 14.0 - 18.0 g/dL    Hematocrit 38.6 (L) 40.0 - 54.0 %    MCV 88 82 - 98 fL    MCH 28.9 27.0 - 31.0 pg    MCHC 32.9 32.0 - 36.0 g/dL    RDW 13.4 11.5 - 14.5 %    Platelets 222 150 - 350 K/uL    MPV 9.3 9.2 - 12.9 fL    Gran # (ANC) 11.2 (H) 1.8 - 7.7 K/uL    Lymph # 0.8 (L) 1.0  - 4.8 K/uL    Mono # 1.6 (H) 0.3 - 1.0 K/uL    Eos # 0.0 0.0 - 0.5 K/uL    Baso # 0.01 0.00 - 0.20 K/uL    Gran% 82.2 (H) 38.0 - 73.0 %    Lymph% 6.1 (L) 18.0 - 48.0 %    Mono% 11.6 4.0 - 15.0 %    Eosinophil% 0.0 0.0 - 8.0 %    Basophil% 0.1 0.0 - 1.9 %    Differential Method Automated    PT/INR   Result Value Ref Range    Prothrombin Time 11.8 9.0 - 12.5 sec    INR 1.1 0.8 - 1.2   PTT   Result Value Ref Range    aPTT 33.1 (H) 21.0 - 32.0 sec   Vancomycin, random   Result Value Ref Range    Vancomycin, Random 11.8 Not established ug/mL   POCT glucose   Result Value Ref Range    POCT Glucose 282 (H) 70 - 110 mg/dL     *Note: Due to a large number of results and/or encounters for the requested time period, some results have not been displayed. A complete set of results can be found in Results Review.       Significant Imaging: I have reviewed all pertinent imaging results/findings within the past 24 hours.   Imaging Results          X-Ray Foot Complete Left (Final result)  Result time 09/18/18 19:50:43    Final result by Nicolas Sawyer MD (09/18/18 19:50:43)                 Impression:      1.  Progressive erosive changes with bone loss involving the 1st distal phalanx concerning for osteomyelitis.    2.  Air bubbles within the left 5th toe, concerning for possible infectious process or gangrene.  Clinical correlation is advised.    3.  Progressive soft tissue loss involving the 4th toe, which could also indicate gangrene.  Clinical correlation is advised.    4.  Persistent healing fracture of the 2nd proximal phalanx.  Other stable findings as noted above.      Electronically signed by: Nicolas Sawyer MD  Date:    09/18/2018  Time:    19:50             Narrative:    EXAMINATION:  XR FOOT COMPLETE 3 VIEW LEFT    CLINICAL HISTORY:  Pain in left foot.  Gangrene, not elsewhere classified    TECHNIQUE:  AP, lateral and oblique views of the left foot were performed.    COMPARISON:  August 16, 2018    FINDINGS:  There is  been interval bone loss involving the distal 1st phalanx, with overlying soft tissue defect.  Healing fracture of the 2nd proximal phalanx again seen.  There has been progressive soft tissue loss overlying the 4th toe and there are air bubbles in the 5th toe soft tissues.  No other bone loss is seen.    Plantar and Achilles calcaneal spurs.  Osteopenia.  Vascular calcifications.  Accessory ossicle adjacent to the navicular bone.                               CT Renal Stone Study ABD Pelvis WO (Final result)  Result time 09/18/18 18:31:38    Final result by Nicolas Sawyer MD (09/18/18 18:31:38)                 Impression:      1.  Negative for acute process involving the abdomen or pelvis.  Negative for inflammatory changes.  Normal appendix.  Negative for renal stone disease or hydronephrosis, in this patient who has atrophic native kidneys and a right lower quadrant transplant kidney.    2. Again seen is mild prominence of the transplant kidney pelvocaliceal system, without dilation of the ureter, likely a chronic finding.    3.  Mild distention of the gallbladder, which is otherwise normal, nonspecific finding.    4.  Fluid within the nondilated colon, which can be seen in patients with diarrhea or gastroenteritis.  Clinical correlation is advised.    5.  Dependent atelectasis in the lung bases.    6.  Moderate sliding-type hiatal hernia with possible distal esophageal thickening.  Does the patient have symptoms of esophagitis?    7.  Stable findings as noted above.    All CT scans at this facility are performed  using dose modulation techniques as appropriate to performed exam including the following:  automated exposure control; adjustment of mA and/or kV according to the patients size (this includes techniques or standardized protocols for targeted exams where dose is matched to indication/reason for exam: i.e. extremities or head);  iterative reconstruction technique.      Electronically signed by: Nicolas  MD Digna  Date:    09/18/2018  Time:    18:31             Narrative:    EXAMINATION:  CT RENAL STONE STUDY ABD PELVIS WO    CLINICAL HISTORY:  Flank pain, stone disease suspected;    TECHNIQUE:  Axial images through the abdomen and pelvis were obtained without the use of IV contrast.  Sagittal and coronal reconstructions are provided for review.  Oral contrast was not utilized.    COMPARISON:  December 11, 2016    FINDINGS:  LUNG BASES: Respiratory motion artifact is present on a number of images.  Subtle abnormalities could be overlooked.  Stable scarring or atelectasis in the inferior lingula and middle lobe.  Lung bases are otherwise clear.  Negative for pleural effusions.  There is a small amount of pericardial fluid.  Coronary artery calcifications.  Moderate size hiatal hernia with thickening of the distal esophagus.    ABDOMEN: The gallbladder is mildly distended.  No surrounding inflammatory changes or wall thickening is seen.  Bilateral perinephric stranding densities noted, with mild atrophy of both kidneys again seen.  Vascular calcifications within the spleen.  Again seen is mild prominence of the biliary tree measuring up to 8 mm.  The liver, spleen and gallbladder otherwise appear normal.  The pancreas is unremarkable.  Kidneys and adrenal glands are otherwise normal.  Negative for hydronephrosis or renal stone disease.  Significant vascular calcifications are present within the renal pelves, with bilateral renal sinus lipomatosis again seen.    There remains to be a transplant kidney in the right anterior hemipelvis.  Mild prominence of the renal collecting system is again seen.  Negative for perinephric stranding densities.  Negative for definite renal stones.    Negative for adenopathy, free fluid or inflammatory change noted within the abdomen or pelvis.  Vascular calcifications are present without aneurysmal changes, with the aorta measuring a maximum of 2.9 cm.    The small bowel appears  normal. Normal appendix.  There is fluid within the proximal and mid colon, without dilation or surrounding inflammatory changes.    PELVIS: The urinary bladder is mildly thick wall, a stable finding.  Prostate calcifications.  The   pelvic organs are breast of e-mail.  There are pelvic phleboliths.    No significant osseous abnormality is identified.  Negative for significant spinal canal stenosis. Similar degree of degenerative disc changes throughout the thoracic and lumbar spine, most significantly involving L3/L4.    Negative for groin adenopathy.    Postoperative changes involve the right pelvic abdominal wall from the transplant kidney placement.  The abdominal wall is intact.                               X-Ray Chest 1 View (Final result)  Result time 09/18/18 17:03:13    Final result by ANA CRISTINA Joseph Sr., MD (09/18/18 17:03:13)                 Impression:      1. There is a mild amount of haziness in the lateral aspect of the base of the left hemithorax.  This is characteristic of atelectasis or subtle pneumonia.  2. The left costophrenic angle is blunted.  This is characteristic of a tiny pleural effusion.  3. There is a mild amount of levoconvex curvature of the thoracic spine.  4. There is mild cardiomegaly.  .      Electronically signed by: Porter Joseph MD  Date:    09/18/2018  Time:    17:03             Narrative:    EXAMINATION:  XR CHEST 1 VIEW    CLINICAL HISTORY:  Adult failure to thrive    COMPARISON:  07/20/2018    FINDINGS:  The size of the heart is prominent.  There is a mild amount of haziness in the lateral aspect of the base of the left hemithorax.  The right lung is clear.  There is no pneumothorax.  The left costophrenic angle is blunted.  The right costophrenic angle is sharp.  There is a mild amount of levoconvex curvature of the thoracic spine.                                I have personally reviewed the patients labs, imaging, and discussed the patient case in detail with the Er  provider. Discussed case with Dr. Estrada, he reports ok to keep renal transplant patient here and to check prograf level in AM. States continue predinose and prograf.     Assessment/Plan:     * Gas gangrene of foot    Vanc/zosyn  Podiatry consult  Source of sepsis        Sepsis    Not severe sepsis. Patients kidney function baseline with kidney transplant and troponin elevation related to CKD.   Continue IV antx therapy with vanc and zosyn for suspected source of foot wound  Cultures pending  Monitor labs        Osteomyelitis of left foot    Vanc/zosyn  poditary consult           Failure to thrive in adult    Social work, pt/ot, dieatry consults          Diabetes mellitus type 2, uncontrolled, with complications    Check a1c  ssi  Consider long acting pending course          PVD (peripheral vascular disease)    Consider flow studies pending course  Consider vascular consult pending cousre  poditary consult for foot woudn.           Chronic kidney disease (CKD), stage III (moderate)    Dr. Estrada with renal consulted due to patient with transplant.  He reports patient can stay here  Renal consulted   Monitor rfp           Essential hypertension      Hold bp meds at this time due to sepsis   Monitor trends and restart once need          VTE Risk Mitigation (From admission, onward)        Ordered     heparin (porcine) injection 5,000 Units  Every 8 hours      09/18/18 2020     Place GOPI hose  Until discontinued      09/18/18 2020     Place sequential compression device  Until discontinued      09/18/18 2020     IP VTE HIGH RISK PATIENT  Once      09/18/18 2020         total critical care time on case: 55 mins    Ryan Rae NP  Department of Hospital Medicine   [facility]

## 2018-09-20 PROBLEM — R78.81 BACTEREMIA: Status: ACTIVE | Noted: 2018-09-20

## 2018-09-20 LAB
ANION GAP SERPL CALC-SCNC: 15 MMOL/L
BASOPHILS # BLD AUTO: 0.01 K/UL
BASOPHILS NFR BLD: 0.1 %
BUN SERPL-MCNC: 27 MG/DL
CALCIUM SERPL-MCNC: 9.1 MG/DL
CHLORIDE SERPL-SCNC: 99 MMOL/L
CO2 SERPL-SCNC: 17 MMOL/L
CREAT SERPL-MCNC: 1.7 MG/DL
DIFFERENTIAL METHOD: ABNORMAL
EOSINOPHIL # BLD AUTO: 0 K/UL
EOSINOPHIL NFR BLD: 0.1 %
ERYTHROCYTE [DISTWIDTH] IN BLOOD BY AUTOMATED COUNT: 13.4 %
EST. GFR  (AFRICAN AMERICAN): 48 ML/MIN/1.73 M^2
EST. GFR  (NON AFRICAN AMERICAN): 41 ML/MIN/1.73 M^2
GLUCOSE SERPL-MCNC: 274 MG/DL
HCT VFR BLD AUTO: 38.7 %
HGB BLD-MCNC: 13.1 G/DL
LYMPHOCYTES # BLD AUTO: 1.4 K/UL
LYMPHOCYTES NFR BLD: 9.8 %
MCH RBC QN AUTO: 29.4 PG
MCHC RBC AUTO-ENTMCNC: 33.9 G/DL
MCV RBC AUTO: 87 FL
MONOCYTES # BLD AUTO: 1.3 K/UL
MONOCYTES NFR BLD: 9 %
NEUTROPHILS # BLD AUTO: 11.5 K/UL
NEUTROPHILS NFR BLD: 81 %
PLATELET # BLD AUTO: 247 K/UL
PMV BLD AUTO: 9.8 FL
POCT GLUCOSE: 261 MG/DL (ref 70–110)
POCT GLUCOSE: 300 MG/DL (ref 70–110)
POCT GLUCOSE: 309 MG/DL (ref 70–110)
POCT GLUCOSE: 379 MG/DL (ref 70–110)
POCT GLUCOSE: 384 MG/DL (ref 70–110)
POTASSIUM SERPL-SCNC: 3.9 MMOL/L
RBC # BLD AUTO: 4.45 M/UL
SODIUM SERPL-SCNC: 131 MMOL/L
VANCOMYCIN SERPL-MCNC: 13.6 UG/ML
WBC # BLD AUTO: 14.14 K/UL

## 2018-09-20 PROCEDURE — 80048 BASIC METABOLIC PNL TOTAL CA: CPT

## 2018-09-20 PROCEDURE — 94761 N-INVAS EAR/PLS OXIMETRY MLT: CPT

## 2018-09-20 PROCEDURE — G8978 MOBILITY CURRENT STATUS: HCPCS | Mod: CK

## 2018-09-20 PROCEDURE — 99900035 HC TECH TIME PER 15 MIN (STAT)

## 2018-09-20 PROCEDURE — 97162 PT EVAL MOD COMPLEX 30 MIN: CPT

## 2018-09-20 PROCEDURE — 25000003 PHARM REV CODE 250: Performed by: EMERGENCY MEDICINE

## 2018-09-20 PROCEDURE — 63600175 PHARM REV CODE 636 W HCPCS: Performed by: INTERNAL MEDICINE

## 2018-09-20 PROCEDURE — 25000242 PHARM REV CODE 250 ALT 637 W/ HCPCS: Performed by: NURSE PRACTITIONER

## 2018-09-20 PROCEDURE — 25000003 PHARM REV CODE 250: Performed by: NURSE PRACTITIONER

## 2018-09-20 PROCEDURE — 63600175 PHARM REV CODE 636 W HCPCS: Performed by: HOSPITALIST

## 2018-09-20 PROCEDURE — 99223 1ST HOSP IP/OBS HIGH 75: CPT | Mod: ,,, | Performed by: PODIATRIST

## 2018-09-20 PROCEDURE — G8987 SELF CARE CURRENT STATUS: HCPCS | Mod: CK

## 2018-09-20 PROCEDURE — 21400001 HC TELEMETRY ROOM

## 2018-09-20 PROCEDURE — 63600175 PHARM REV CODE 636 W HCPCS: Performed by: EMERGENCY MEDICINE

## 2018-09-20 PROCEDURE — 63600175 PHARM REV CODE 636 W HCPCS: Performed by: NURSE PRACTITIONER

## 2018-09-20 PROCEDURE — 25000003 PHARM REV CODE 250: Performed by: INTERNAL MEDICINE

## 2018-09-20 PROCEDURE — 85025 COMPLETE CBC W/AUTO DIFF WBC: CPT

## 2018-09-20 PROCEDURE — 36415 COLL VENOUS BLD VENIPUNCTURE: CPT

## 2018-09-20 PROCEDURE — 97530 THERAPEUTIC ACTIVITIES: CPT

## 2018-09-20 PROCEDURE — 94640 AIRWAY INHALATION TREATMENT: CPT

## 2018-09-20 PROCEDURE — 80202 ASSAY OF VANCOMYCIN: CPT

## 2018-09-20 PROCEDURE — 97166 OT EVAL MOD COMPLEX 45 MIN: CPT

## 2018-09-20 PROCEDURE — 99232 SBSQ HOSP IP/OBS MODERATE 35: CPT | Mod: ,,, | Performed by: INTERNAL MEDICINE

## 2018-09-20 PROCEDURE — G8979 MOBILITY GOAL STATUS: HCPCS | Mod: CI

## 2018-09-20 PROCEDURE — G8988 SELF CARE GOAL STATUS: HCPCS | Mod: CJ

## 2018-09-20 RX ORDER — HYDROCODONE BITARTRATE AND ACETAMINOPHEN 10; 325 MG/1; MG/1
1 TABLET ORAL 3 TIMES DAILY PRN
Status: DISCONTINUED | OUTPATIENT
Start: 2018-09-20 | End: 2018-09-20

## 2018-09-20 RX ORDER — HYDROCODONE BITARTRATE AND ACETAMINOPHEN 10; 325 MG/1; MG/1
1 TABLET ORAL EVERY 4 HOURS PRN
Status: DISCONTINUED | OUTPATIENT
Start: 2018-09-20 | End: 2018-09-26 | Stop reason: HOSPADM

## 2018-09-20 RX ORDER — HYDRALAZINE HYDROCHLORIDE 20 MG/ML
10 INJECTION INTRAMUSCULAR; INTRAVENOUS EVERY 8 HOURS PRN
Status: DISCONTINUED | OUTPATIENT
Start: 2018-09-20 | End: 2018-09-22

## 2018-09-20 RX ORDER — TRAZODONE HYDROCHLORIDE 50 MG/1
50 TABLET ORAL NIGHTLY PRN
Status: DISCONTINUED | OUTPATIENT
Start: 2018-09-20 | End: 2018-09-26 | Stop reason: HOSPADM

## 2018-09-20 RX ADMIN — HYDRALAZINE HYDROCHLORIDE 10 MG: 20 INJECTION INTRAMUSCULAR; INTRAVENOUS at 04:09

## 2018-09-20 RX ADMIN — HYDROCODONE BITARTRATE AND ACETAMINOPHEN 1 TABLET: 10; 325 TABLET ORAL at 05:09

## 2018-09-20 RX ADMIN — BUDESONIDE 0.5 MG: 0.5 SUSPENSION RESPIRATORY (INHALATION) at 07:09

## 2018-09-20 RX ADMIN — GABAPENTIN 100 MG: 100 CAPSULE ORAL at 03:09

## 2018-09-20 RX ADMIN — TRAMADOL HYDROCHLORIDE 50 MG: 50 TABLET, FILM COATED ORAL at 09:09

## 2018-09-20 RX ADMIN — INSULIN ASPART 1 UNITS: 100 INJECTION, SOLUTION INTRAVENOUS; SUBCUTANEOUS at 09:09

## 2018-09-20 RX ADMIN — PIPERACILLIN SODIUM AND TAZOBACTAM SODIUM 4.5 G: 4; .5 INJECTION, POWDER, LYOPHILIZED, FOR SOLUTION INTRAVENOUS at 05:09

## 2018-09-20 RX ADMIN — PIPERACILLIN SODIUM AND TAZOBACTAM SODIUM 4.5 G: 4; .5 INJECTION, POWDER, LYOPHILIZED, FOR SOLUTION INTRAVENOUS at 01:09

## 2018-09-20 RX ADMIN — ACETAMINOPHEN 650 MG: 325 TABLET ORAL at 06:09

## 2018-09-20 RX ADMIN — ARFORMOTEROL TARTRATE 15 MCG: 15 SOLUTION RESPIRATORY (INHALATION) at 07:09

## 2018-09-20 RX ADMIN — TACROLIMUS 1.5 MG: 1 CAPSULE ORAL at 05:09

## 2018-09-20 RX ADMIN — PREDNISONE 5 MG: 5 TABLET ORAL at 09:09

## 2018-09-20 RX ADMIN — VANCOMYCIN HYDROCHLORIDE 1250 MG: 10 INJECTION, POWDER, LYOPHILIZED, FOR SOLUTION INTRAVENOUS at 09:09

## 2018-09-20 RX ADMIN — AMLODIPINE BESYLATE 2.5 MG: 2.5 TABLET ORAL at 09:09

## 2018-09-20 RX ADMIN — TACROLIMUS 1.5 MG: 1 CAPSULE ORAL at 09:09

## 2018-09-20 RX ADMIN — INSULIN ASPART 5 UNITS: 100 INJECTION, SOLUTION INTRAVENOUS; SUBCUTANEOUS at 11:09

## 2018-09-20 RX ADMIN — TRAMADOL HYDROCHLORIDE 50 MG: 50 TABLET, FILM COATED ORAL at 01:09

## 2018-09-20 RX ADMIN — TRAZODONE HYDROCHLORIDE 50 MG: 50 TABLET ORAL at 09:09

## 2018-09-20 RX ADMIN — PANTOPRAZOLE SODIUM 40 MG: 40 TABLET, DELAYED RELEASE ORAL at 09:09

## 2018-09-20 RX ADMIN — PIPERACILLIN SODIUM AND TAZOBACTAM SODIUM 4.5 G: 4; .5 INJECTION, POWDER, LYOPHILIZED, FOR SOLUTION INTRAVENOUS at 11:09

## 2018-09-20 RX ADMIN — HYDROCODONE BITARTRATE AND ACETAMINOPHEN 1 TABLET: 10; 325 TABLET ORAL at 09:09

## 2018-09-20 RX ADMIN — SODIUM BICARBONATE 650 MG TABLET 2600 MG: at 09:09

## 2018-09-20 RX ADMIN — HYDROCODONE BITARTRATE AND ACETAMINOPHEN 1 TABLET: 10; 325 TABLET ORAL at 11:09

## 2018-09-20 RX ADMIN — INSULIN DETEMIR 10 UNITS: 100 INJECTION, SOLUTION SUBCUTANEOUS at 09:09

## 2018-09-20 RX ADMIN — ASPIRIN 81 MG: 81 TABLET, COATED ORAL at 09:09

## 2018-09-20 RX ADMIN — INSULIN ASPART 3 UNITS: 100 INJECTION, SOLUTION INTRAVENOUS; SUBCUTANEOUS at 06:09

## 2018-09-20 RX ADMIN — GABAPENTIN 100 MG: 100 CAPSULE ORAL at 09:09

## 2018-09-20 RX ADMIN — OLANZAPINE 5 MG: 5 TABLET, ORALLY DISINTEGRATING ORAL at 09:09

## 2018-09-20 RX ADMIN — INSULIN ASPART 4 UNITS: 100 INJECTION, SOLUTION INTRAVENOUS; SUBCUTANEOUS at 05:09

## 2018-09-20 NOTE — SUBJECTIVE & OBJECTIVE
Interval History: Patient has agreed to BKA in AM. Will be NPO after MN for AM surgery. Pt sees pain management and has pain contract. Restarted home pain meds.    Review of Systems   Constitutional: Negative for chills, diaphoresis, fatigue and fever.   HENT: Negative for congestion, postnasal drip, rhinorrhea and sore throat.    Eyes: Negative for pain and visual disturbance.   Respiratory: Negative for cough, shortness of breath and wheezing.    Cardiovascular: Negative for chest pain, palpitations and leg swelling.   Gastrointestinal: Negative for abdominal distention, abdominal pain, constipation, diarrhea, nausea and vomiting.   Endocrine: Negative for cold intolerance and heat intolerance.   Genitourinary: Negative for difficulty urinating, dysuria and hematuria.   Musculoskeletal: Positive for back pain.   Skin: Positive for wound.   Allergic/Immunologic: Positive for immunocompromised state.   Neurological: Negative for dizziness, syncope, speech difficulty, weakness, light-headedness and headaches.   Hematological: Negative.    Psychiatric/Behavioral: Negative for agitation, behavioral problems and confusion.     Objective:     Vital Signs (Most Recent):  Temp: 96.8 °F (36 °C) (09/20/18 1142)  Pulse: 79 (09/20/18 1142)  Resp: 18 (09/20/18 1142)  BP: (!) 165/74 (09/20/18 1142)  SpO2: (!) 93 % (09/20/18 1142) Vital Signs (24h Range):  Temp:  [96.8 °F (36 °C)-99.7 °F (37.6 °C)] 96.8 °F (36 °C)  Pulse:  [] 79  Resp:  [17-20] 18  SpO2:  [88 %-95 %] 93 %  BP: (123-214)/() 165/74     Weight: 93.1 kg (205 lb 3.2 oz)  Body mass index is 28.62 kg/m².    Intake/Output Summary (Last 24 hours) at 9/20/2018 1208  Last data filed at 9/20/2018 1000  Gross per 24 hour   Intake --   Output 2200 ml   Net -2200 ml      Physical Exam   Constitutional: He is oriented to person, place, and time. He appears well-developed. He has a sickly appearance. He appears ill. No distress.   Elderly, unkept   HENT:   Head:  Normocephalic and atraumatic.   Nose: Nose normal.   Eyes: Conjunctivae and EOM are normal. Pupils are equal, round, and reactive to light. No scleral icterus.   Neck: Normal range of motion. Neck supple. No JVD present. No tracheal deviation present.   Cardiovascular: Normal rate, regular rhythm, normal heart sounds and intact distal pulses.   No murmur heard.  Pulmonary/Chest: Effort normal and breath sounds normal. No respiratory distress. He has no wheezes.   Abdominal: Soft. Bowel sounds are normal. He exhibits no distension. There is no tenderness.   obese   Genitourinary:   Genitourinary Comments: Urinating independently   Musculoskeletal: Normal range of motion. He exhibits edema (1+pitting to left foot.). He exhibits no deformity.   Amputation to right lower   Left foot wounds   Neurological: He is alert and oriented to person, place, and time. No cranial nerve deficit.        Skin: Skin is warm and dry. Capillary refill takes 2 to 3 seconds. He is not diaphoretic.   Unkept, dusky 2nd toe, necrotic wound to 4th and 5th toes.     Psychiatric: He has a normal mood and affect. His behavior is normal. Judgment and thought content normal.   Nursing note and vitals reviewed.                Significant Labs:   Recent Lab Results       09/20/18  1115 09/20/18  1002 09/20/18  0608 09/20/18  0524 09/19/18  2048      Anion Gap    15      Baso #    0.01      Basophil%    0.1      BUN, Bld    27      Calcium    9.1      Chloride    99      CO2    17      Creatinine    1.7      Differential Method    Automated      eGFR if     48      eGFR if non     41  Comment:  Calculation used to obtain the estimated glomerular filtration  rate (eGFR) is the CKD-EPI equation.         Eos #    0.0      Eosinophil%    0.1      Glucose    274      Gran # (ANC)    11.5      Gran%    81.0      Hematocrit    38.7      Hemoglobin    13.1      Lymph #    1.4      Lymph%    9.8      MCH    29.4      MCHC     33.9      MCV    87      Mono #    1.3      Mono%    9.0      MPV    9.8      Platelets    247      POCT Glucose 384 379 261  293     Potassium    3.9      RBC    4.45      RDW    13.4      Sodium    131      Vancomycin, Random    13.6      WBC    14.14                  09/19/18  1725      Anion Gap      Baso #      Basophil%      BUN, Bld      Calcium      Chloride      CO2      Creatinine      Differential Method      eGFR if       eGFR if non       Eos #      Eosinophil%      Glucose      Gran # (ANC)      Gran%      Hematocrit      Hemoglobin      Lymph #      Lymph%      MCH      MCHC      MCV      Mono #      Mono%      MPV      Platelets      POCT Glucose 246     Potassium      RBC      RDW      Sodium      Vancomycin, Random      WBC          All pertinent labs within the past 24 hours have been reviewed.    Significant Imaging: I have reviewed all pertinent imaging results/findings within the past 24 hours.

## 2018-09-20 NOTE — ASSESSMENT & PLAN NOTE
Gunjan/zosyn  Podiatry consult  Source of sepsis  -Toe amputation scheduled 9/19, possible foot amputation    --TMA scheduled for 9/21

## 2018-09-20 NOTE — PROGRESS NOTES
[facility] -   Nephrology  Progress Note    Patient Name: Lavelle Ladd  MRN: [id number]  Admission Date: 9/18/2018  Hospital Length of Stay: 2 days  Attending Provider: Stephanie Mueller MD   Primary Care Physician: Rishabh Esteban MD  Principal Problem:Gas gangrene of foot    Subjective:     HPI: Patient is a 63-year-old male with type 1 diabetes with multiple complications including peripheral neuropathy and peripheral arterial disease.  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  Patient has been struggling with left foot wound and peripheral arterial disease along with osteomyelitis and gangrene of the left foot.  Patient is admitted with severe infection of the left foot with the possibility of needing surgical intervention.  Patient's baseline creatinine has been ranging between 1.6 and 1.9.  Patient's creatinine on admission is 1.9 and today is 1.8.  Patient is off and on confused and in bed social situation at home he was found to have feces all over himself.  Patient has an elderly mother at home as well with multiple social issues which are being addressed at this time.  Patient seen and examined in the hospital room bedside for renal transplant issues.  Patient has been on Prograf and prednisone.  No CellCept has been given to her at this time.    Interval History: foot surgery tomorrow Blood Cx +    Review of patient's allergies indicates:  No Known Allergies  Current Facility-Administered Medications   Medication Frequency    acetaminophen tablet 650 mg Q4H PRN    amLODIPine tablet 2.5 mg Daily    arformoterol nebulizer solution 15 mcg Q12H    aspirin EC tablet 81 mg Daily    budesonide nebulizer solution 0.5 mg Q12H    dextrose 50% injection 12.5 g PRN    dextrose 50% injection 25 g PRN    gabapentin capsule 100 mg TID    glucagon (human recombinant) injection 1 mg PRN    glucose chewable tablet 16 g PRN    glucose chewable tablet 24 g PRN    hydrALAZINE injection 10 mg Q8H PRN     HYDROcodone-acetaminophen  mg per tablet 1 tablet TID PRN    insulin aspart U-100 pen 0-5 Units QID (AC + HS) PRN    olanzapine zydis disintegrating tablet 5 mg QHS    ondansetron injection 4 mg Q8H PRN    pantoprazole EC tablet 40 mg Daily    piperacillin-tazobactam 4.5 g in dextrose 5 % 100 mL IVPB (ready to mix system) Q8H    predniSONE tablet 5 mg Daily    sodium bicarbonate tablet 2,600 mg BID    sodium chloride 0.9% flush 5 mL PRN    tacrolimus capsule 1.5 mg BID    [START ON 9/21/2018] vancomycin (VANCOCIN) 1,250 mg in dextrose 5 % 250 mL IVPB Q18H       Objective:     Vital Signs (Most Recent):  Temp: 96.8 °F (36 °C) (09/20/18 1142)  Pulse: 79 (09/20/18 1142)  Resp: 18 (09/20/18 1142)  BP: (!) 165/74 (09/20/18 1142)  SpO2: (!) 93 % (09/20/18 1142)  O2 Device (Oxygen Therapy): (S) nasal cannula(Simultaneous filing. User may not have seen previous data.) (09/20/18 0735) Vital Signs (24h Range):  Temp:  [96.8 °F (36 °C)-99.7 °F (37.6 °C)] 96.8 °F (36 °C)  Pulse:  [] 79  Resp:  [17-20] 18  SpO2:  [88 %-95 %] 93 %  BP: (123-214)/() 165/74     Weight: 93.1 kg (205 lb 3.2 oz) (09/19/18 1520)  Body mass index is 28.62 kg/m².  Body surface area is 2.16 meters squared.    I/O last 3 completed shifts:  In: -   Out: 1400 [Urine:1400]    Physical Exam   Constitutional: He appears well-developed. He has a sickly appearance. He appears ill. No distress.   Elderly, unkept   HENT:   Head: Normocephalic and atraumatic.   Nose: Nose normal.   Eyes: Conjunctivae and EOM are normal. Pupils are equal, round, and reactive to light. No scleral icterus.   Neck: Normal range of motion. Neck supple. No tracheal deviation present.   Cardiovascular: Normal rate, regular rhythm, normal heart sounds and intact distal pulses.   No murmur heard.  Pulmonary/Chest: Effort normal and breath sounds normal. No stridor. No respiratory distress. He has no wheezes. He has no rales.   Abdominal: Soft. Bowel sounds are  normal. He exhibits no distension. There is no tenderness. There is no guarding.   obese   Genitourinary:   Genitourinary Comments: See ua   Musculoskeletal: Normal range of motion. He exhibits edema (1+pitting to left foot.). He exhibits no deformity.   Amputation to right lower   Left foot wounds   Neurological: No cranial nerve deficit.   Drowsy, oriented to person and situation     Skin: Skin is warm and dry. Capillary refill takes 2 to 3 seconds. He is not diaphoretic.   Unkept, dusky 2nd toe, necrotic wound to 4th and 5th toes.  See image of foot wound to left   Psychiatric:   Withdrawn, depressed    Nursing note and vitals reviewed.      Significant Labs:  All labs within the past 24 hours have been reviewed.     Significant Imaging:  Labs: Reviewed    Assessment/Plan:      donor kidney transplant for DM 16    Patient's kidney function is about at baseline.  Mild increase in creatinine in the presence of infection is not a concern at this time.  Patient is has being life-threatening infections and peripheral arterial disease remained require either partial foot or complete below-knee amputation.  At this point I would recommend continuation of Prograf and prednisone without any adjustment.  Prograf levelis acceptable at this time.  We will continue follow the patient very closely.    Foot surgery tomorrow ( ? refused BKA ) ? TMA             Thank you for your consult.     Avel Estrada MD  Nephrology  [facility] -

## 2018-09-20 NOTE — SUBJECTIVE & OBJECTIVE
Interval History: foot surgery tomorrow Blood Cx +    Review of patient's allergies indicates:  No Known Allergies  Current Facility-Administered Medications   Medication Frequency    acetaminophen tablet 650 mg Q4H PRN    amLODIPine tablet 2.5 mg Daily    arformoterol nebulizer solution 15 mcg Q12H    aspirin EC tablet 81 mg Daily    budesonide nebulizer solution 0.5 mg Q12H    dextrose 50% injection 12.5 g PRN    dextrose 50% injection 25 g PRN    gabapentin capsule 100 mg TID    glucagon (human recombinant) injection 1 mg PRN    glucose chewable tablet 16 g PRN    glucose chewable tablet 24 g PRN    hydrALAZINE injection 10 mg Q8H PRN    HYDROcodone-acetaminophen  mg per tablet 1 tablet TID PRN    insulin aspart U-100 pen 0-5 Units QID (AC + HS) PRN    olanzapine zydis disintegrating tablet 5 mg QHS    ondansetron injection 4 mg Q8H PRN    pantoprazole EC tablet 40 mg Daily    piperacillin-tazobactam 4.5 g in dextrose 5 % 100 mL IVPB (ready to mix system) Q8H    predniSONE tablet 5 mg Daily    sodium bicarbonate tablet 2,600 mg BID    sodium chloride 0.9% flush 5 mL PRN    tacrolimus capsule 1.5 mg BID    [START ON 9/21/2018] vancomycin (VANCOCIN) 1,250 mg in dextrose 5 % 250 mL IVPB Q18H       Objective:     Vital Signs (Most Recent):  Temp: 96.8 °F (36 °C) (09/20/18 1142)  Pulse: 79 (09/20/18 1142)  Resp: 18 (09/20/18 1142)  BP: (!) 165/74 (09/20/18 1142)  SpO2: (!) 93 % (09/20/18 1142)  O2 Device (Oxygen Therapy): (S) nasal cannula(Simultaneous filing. User may not have seen previous data.) (09/20/18 0735) Vital Signs (24h Range):  Temp:  [96.8 °F (36 °C)-99.7 °F (37.6 °C)] 96.8 °F (36 °C)  Pulse:  [] 79  Resp:  [17-20] 18  SpO2:  [88 %-95 %] 93 %  BP: (123-214)/() 165/74     Weight: 93.1 kg (205 lb 3.2 oz) (09/19/18 1520)  Body mass index is 28.62 kg/m².  Body surface area is 2.16 meters squared.    I/O last 3 completed shifts:  In: -   Out: 1400  [Urine:1400]    Physical Exam   Constitutional: He appears well-developed. He has a sickly appearance. He appears ill. No distress.   Elderly, unkept   HENT:   Head: Normocephalic and atraumatic.   Nose: Nose normal.   Eyes: Conjunctivae and EOM are normal. Pupils are equal, round, and reactive to light. No scleral icterus.   Neck: Normal range of motion. Neck supple. No tracheal deviation present.   Cardiovascular: Normal rate, regular rhythm, normal heart sounds and intact distal pulses.   No murmur heard.  Pulmonary/Chest: Effort normal and breath sounds normal. No stridor. No respiratory distress. He has no wheezes. He has no rales.   Abdominal: Soft. Bowel sounds are normal. He exhibits no distension. There is no tenderness. There is no guarding.   obese   Genitourinary:   Genitourinary Comments: See ua   Musculoskeletal: Normal range of motion. He exhibits edema (1+pitting to left foot.). He exhibits no deformity.   Amputation to right lower   Left foot wounds   Neurological: No cranial nerve deficit.   Drowsy, oriented to person and situation     Skin: Skin is warm and dry. Capillary refill takes 2 to 3 seconds. He is not diaphoretic.   Unkept, dusky 2nd toe, necrotic wound to 4th and 5th toes.  See image of foot wound to left   Psychiatric:   Withdrawn, depressed    Nursing note and vitals reviewed.      Significant Labs:  All labs within the past 24 hours have been reviewed.     Significant Imaging:  Labs: Reviewed

## 2018-09-20 NOTE — PROGRESS NOTES
Clinical Pharmacy Progress Note: Vancomycin Dosing     Vancomycin Day #2   Estimated Creatinine Clearance: 50.5 mL/min (A) (based on SCr of 1.7 mg/dL (H)).   SCr trend: (decreased x 1 day)   Kidney transplant patient   Lab Results   Component Value Date    WBC 14.14 (H) 09/20/2018   WBC trend: (increased slightly x 1 day)         Tmax/24h: 99.7   Level:   Vancomycin, random [[id number]] Collected: 09/20/18 0524   Specimen: Blood Updated: 09/20/18 0617    Vancomycin, Random 13.6 ug/mL    Goal trough: 15-20 mcg/mL   Indication: Staph bacteremia, osteomyelitis of left foot/gas gangrene  Potential BKA per Vascular Surgery, which Podiatry evaluation planned for today.      Cultures:   Blood on 9/18- G+ cocci in clusters x 2 tubes  Historic culture from 7/9/18: C. Freundii, K. Oxytoca. Both susceptible to Zosyn, which patient is currently receiving.   Plan: Pharmacy will change vancomycin dose to 1250 mg IV every 18 hours and draw a trough prior to the 3rd dose of the scheduled regimen.   Next level due: Vancomycin trough due 09/21/18 @ 2000.     Thank you for allowing us to participate in this patient's care.    Radha Lopez, PharmD 9/20/2018 7:47 AM

## 2018-09-20 NOTE — PROGRESS NOTES
Pain not controlled under current regimen. TMA scheduled for the AM. Pain med frequency increased to q4h.

## 2018-09-20 NOTE — PROGRESS NOTES
Pt. Is ready for discharge.  Family member going home to get home O2.  Patient will let me know when she returns with oxygen.

## 2018-09-20 NOTE — ASSESSMENT & PLAN NOTE
Not severe sepsis. Patients kidney function baseline with kidney transplant and troponin elevation related to CKD.   Continue IV antx therapy with vanc and zosyn for suspected source of foot wound  --BC grew STAPHYLOCOCCUS AUREUS, sensitivities pending   --continue IV Vancomycin  Monitor labs

## 2018-09-20 NOTE — PT/OT/SLP EVAL
Occupational Therapy   Evaluation    Name: Lavelle Ladd  MRN: [id number]  Admitting Diagnosis:  Gas gangrene of foot      Recommendations:     Discharge Recommendations: nursing facility, skilled, LTACH (long term acute care hospital)  Discharge Equipment Recommendations:  none  Barriers to discharge:  Decreased caregiver support    History:     Occupational Profile:  Living Environment: lives 90 year old mother in 1 story house with no steps to enter  Previous level of function: (i) with adl's and functional mobility  Roles and Routines: occupational therapy  Equipment Used at Home:  walker, rolling, wheelchair, cane, straight, bath bench, power chair  Assistance upon Discharge:     Past Medical History:   Diagnosis Date    DINORAH (acute kidney injury) 2016    Arthritis     Chronic obstructive pulmonary disease 2016    Coronary artery disease involving native coronary artery of native heart without angina pectoris 2016     donor kidney transplant for DM 16     Induction with Thymo x3 and IV solumedrol to total 875mg  Kidney Biopsy  2016: 16 glomeruli, ACR type 1 AVR type 2, significant microcirculatory changes, c4d negative, No DSA, 5 to10% fibrosis. Treated with thymo x8 2016- no rejection      Diastolic heart failure     Encounter for blood transfusion     ESRD on RRT since 10/2013 10/29/2013    Biopsy proven diabetic nephropathy and lymphoplasmacytic interstitial infiltrate not c/w with AIN (ddx sjogrens or assoc with tamm-horsefall protein extravasation)     GERD (gastroesophageal reflux disease)     History of hepatitis C, s/p successful Rx w/ SVR12 - 2017    Completed 12 weeks harvoni w/ SVR    Hyperlipidemia     Hypertension     PIC line (peripherally inserted central catheter) flush     Prophylactic immunotherapy     Proteinuria     PVD (peripheral vascular disease) 2017    RLE BKA CT 16 Extensive atherosclerotic disease of the aorta  and mesenteric arteries.     Renal hypertension     Type 2 diabetes mellitus with diabetic neuropathy, with long-term current use of insulin 12/1/2016    Vitamin B12 deficiency        Past Surgical History:   Procedure Laterality Date    AORTOGRAPHY WITH SERIALOGRAPHY N/A 6/14/2018    Procedure: LEFT LEG ANGIOGRAM;  Surgeon: Donal Mcdonald MD;  Location: [facility] CATH LAB;  Service: Vascular;  Laterality: N/A;    av bovine graft      Left UE    AV FISTULA PLACEMENT      left UE    BIOPSY Tranplanted Kidney N/A 8/15/2016    Performed by Paulette Hanna MD at [facility] OR [address] [place] [address]    BIOPSY, LIVER, TRANSJUGULAR APPROACH N/A 4/24/2014    Performed by [facility] Diagnostic Provider at [facility] CATH LAB    CARDIAC CATHETERIZATION  02/2015    INSERTION, CATHETER, VASCULAR N/A 10/31/2013    Performed by Ralph Mckeon MD at [facility] CATH LAB    IRRIGATION AND DEBRIDEMENT Left 7/9/2018    Performed by Katerina Magaña DPM at [facility] OR    KIDNEY TRANSPLANT  05/21/2016    LEFT LEG ANGIOGRAM N/A 6/14/2018    Performed by Donal Mcdonald MD at [facility] CATH LAB    LEG AMPUTATION THROUGH KNEE  2011    right LE, started as nail puncture leading to diabetic ulcer    TRANSPLANT-KIDNEY Right 5/21/2016    [facility]       Subjective     Chief Complaint: debility and generalized weakness  Patient/Family Comments/goals:     Pain/Comfort:  · Pain Rating 1: 9/10  · Location - Side 1: Right  · Location - Orientation 1: lower  · Location 1: foot    Patients cultural, spiritual, Catholic conflicts given the current situation:      Objective:     Communicated with: nurse and epic chrt review prior to session.  Patient found all lines intact, call button in reach and nurse  notified    and telemetry, peripheral IV upon OT entry to room.    General Precautions: Standard, fall   Orthopedic Precautions:N/A   Braces: N/A     Occupational Performance:    Bed Mobility:    · Patient completed Rolling/Turning to Left with   stand by assistance  · Patient completed Rolling/Turning to Right with stand by assistance  · Patient completed Scooting/Bridging with contact guard assistance  · Patient completed Supine to Sit with contact guard assistance  · Patient completed Sit to Supine with contact guard assistance    Functional Mobility/Transfers:  · Patient completed Sit <> Stand Transfer with contact guard assistance  with  rolling walker   · Functional Mobility: pt req cga with functional mobility with rolling walker and slow pace    Activities of Daily Living:  · Upper Body Dressing: minimum assistance .       Cognitive/Visual Perceptual:  Cognitive/Psychosocial Skills:     -       Oriented to: Person, Place, Time and Situation   -       Follows Commands/attention:Follows multistep  commands  -       Communication: clear/fluent  -       Memory: No Deficits noted  -       Safety awareness/insight to disability: impaired   Visual/Perceptual:      -Intact .    Physical Exam:  Upper Extremity Range of Motion:     -       Right Upper Extremity: WFL .  -       Left Upper Extremity: WFL . .  Upper Extremity Strength:    -       Right Upper Extremity: mmt: 3+/5 grossly  -       Left Upper Extremity: mmt: 3+/5 grossly .   Strength:    -       Right Upper Extremity: mmt: 3+/5 grossly .  -       Left Upper Extremity: mmt: 3+/5 grossly .       AMPAC 6 Click ADL:  AMPAC Total Score: 20    Treatment & Education:    Education:    Patient left right sidelying with all lines intact, call button in reach, bed alarm on and nurse Gretchne notified    Assessment:     Lavelle Ladd is a 63 y.o. male with a medical diagnosis of Gas gangrene of foot.  He presents with the following performance deficits affecting function: weakness, impaired self care skills, impaired balance, decreased safety awareness, impaired endurance, impaired functional mobilty, gait instability, pain.      Rehab Prognosis: Good; patient would benefit from acute skilled OT services  "to address these deficits and reach maximum level of function.         Clinical Decision Makin.  OT Mod:  "Pt evaluation falls under moderate complexity for evaluation coding due to identification of 3-5 performance deficits noted as stated above. Eval required Min/Mod assistance to complete on this date and detailed assessment(s) were utilized. Moreover, an expanded review of history and occupational profile obtained with additional review of cognitive, physical and psychosocial hx."     Plan:     Patient to be seen 3 x/week to address the above listed problems via self-care/home management, therapeutic exercises, therapeutic activities  · Plan of Care Expires: 18  · Plan of Care Reviewed with: parent    This Plan of care has been discussed with the patient who was involved in its development and understands and is in agreement with the identified goals and treatment plan    GOALS:   Multidisciplinary Problems     Occupational Therapy Goals        Problem: Occupational Therapy Goal    Goal Priority Disciplines Outcome Interventions   Occupational Therapy Goal     OT, PT/OT     Description:  OT goals to be met by 18  sba with ue dressing  Pt will tolerate 1 set x 15 reps b ue rom exercise with min resistance  sba with bsc t/f's.                     Time Tracking:     OT Date of Treatment: 18  OT Start Time: 1016  OT Stop Time: 1040  OT Total Time (min): 24 min    Billable Minutes:Evaluation 10 minutes  Therapeutic Activity 14 minutes    Lola Quintero OT  2018    "

## 2018-09-20 NOTE — ASSESSMENT & PLAN NOTE
Gunjan/zosyn  poditary consult   -Left toe amputation scheduled 9/19, possible left foot amputation might be required.    --TMA schedueled 9/21

## 2018-09-20 NOTE — ASSESSMENT & PLAN NOTE
Patient's kidney function is about at baseline.  Mild increase in creatinine in the presence of infection is not a concern at this time.  Patient is has being life-threatening infections and peripheral arterial disease remained require either partial foot or complete below-knee amputation.  At this point I would recommend continuation of Prograf and prednisone without any adjustment.  Prograf levelis acceptable at this time.  We will continue follow the patient very closely.    Foot surgery tomorrow ( ? refused BKA ) ? TMA

## 2018-09-20 NOTE — PLAN OF CARE
09/20/18 1138   Medicare Message   Important Message from Medicare regarding Discharge Appeal Rights Given to patient/caregiver;Explained to patient/caregiver;Signed/date by patient/caregiver   Date IMM was signed 09/20/18   Time IMM was signed 1133

## 2018-09-20 NOTE — ASSESSMENT & PLAN NOTE
--vascular and podiatry following  --Pt to have TMA tomorrow - refuses BKA  --continue daily  ASA

## 2018-09-20 NOTE — PROGRESS NOTES
[facility] Hospital Medicine  Progress Note    Patient Name: Lavelle Ladd  MRN: [id number]  Patient Class: IP- Inpatient   Admission Date: 9/18/2018  Length of Stay: 2 days  Attending Physician: Stephanie Mueller MD  Primary Care Provider: Rishabh Esteban MD        Subjective:     Principal Problem:Gas gangrene of foot    HPI:  HPI limited due to patient confusion and obtained from Er records.  Lavelle Ladd is a 63 y.o. male kidney transplant patient with hx of HTN, CAD, diastolic heart failure, Type 2 DM with diabetic neuropathy, ESRD, PVD, COPD who was brought to ED via EMS secondary to failure to thrive which has been present for an unknown amount of time. EMS reported that there was human and dog feces in patient's room and that patient does not want to take his medications. His CBG was 348. Patient with lower foot wound to left. Patient evalauted in ER. WBC 16.72, Na 127, Anion 18, Cr. 1.9, Trop 0.036, ,  CT renal:1. Negative for acute process involving the abdomen or pelvis. Negative for inflammatory changes. Normal appendix. Negative for renal stone disease or hydronephrosis, in this patient who has atrophic native kidneys and a right lower quadrant transplant kidney.2. Again seen is mild prominence of the transplant kidney pelvocaliceal system, without dilation of the ureter, likely a chronic finding.3. Mild distention of the gallbladder, which is otherwise normal, nonspecific finding.4. Fluid within the nondilated colon, which can be seen in patients with diarrhea or gastroenteritis. Clinical correlation is advised.5. Dependent atelectasis in the lung bases.6. Moderate sliding-type hiatal hernia with possible distal esophageal thickening. Does the patient have symptoms of esophagitis?7. Stable findings as noted above.  Chest Xray1. There is a mild amount of haziness in the lateral aspect of the base of the left hemithorax.  This is characteristic of atelectasis or subtle  pneumonia.2. The left costophrenic angle is blunted.  This is characteristic of a tiny pleural effusion.3. There is a mild amount of levoconvex curvature of the thoracic spine.4. There is mild cardiomegaly. Xray left foot:1.  Progressive erosive changes with bone loss involving the 1st distal phalanx concerning for osteomyelitis.2.  Air bubbles within the left 5th toe, concerning for possible infectious process or gangrene.  Clinical correlation is advised.3.  Progressive soft tissue loss involving the 4th toe, which could also indicate gangrene.  Clinical correlation is advised. Hosptial medicine consulted. Patient admitted    Hospital Course:  9/19/18 - The patient was admitted to Inpatient for possible gas gangrene of the left foot under the care of Hospital Medicine. Dr. Wheeler, Podiatry, is following with plans to amputate the left 5th toe with consideration of left foot amputation. Patient is NPO for surgery. Vascular surgery consulted. Cr 1.8, BUN 24, eGFR 39. Nephrology consulted.     Interval History: Patient has agreed to BKA in AM. Will be NPO after MN for AM surgery. Pt sees pain management and has pain contract. Restarted home pain meds.    Review of Systems   Constitutional: Negative for chills, diaphoresis, fatigue and fever.   HENT: Negative for congestion, postnasal drip, rhinorrhea and sore throat.    Eyes: Negative for pain and visual disturbance.   Respiratory: Negative for cough, shortness of breath and wheezing.    Cardiovascular: Negative for chest pain, palpitations and leg swelling.   Gastrointestinal: Negative for abdominal distention, abdominal pain, constipation, diarrhea, nausea and vomiting.   Endocrine: Negative for cold intolerance and heat intolerance.   Genitourinary: Negative for difficulty urinating, dysuria and hematuria.   Musculoskeletal: Positive for back pain.   Skin: Positive for wound.   Allergic/Immunologic: Positive for immunocompromised state.   Neurological: Negative  for dizziness, syncope, speech difficulty, weakness, light-headedness and headaches.   Hematological: Negative.    Psychiatric/Behavioral: Negative for agitation, behavioral problems and confusion.     Objective:     Vital Signs (Most Recent):  Temp: 96.8 °F (36 °C) (09/20/18 1142)  Pulse: 79 (09/20/18 1142)  Resp: 18 (09/20/18 1142)  BP: (!) 165/74 (09/20/18 1142)  SpO2: (!) 93 % (09/20/18 1142) Vital Signs (24h Range):  Temp:  [96.8 °F (36 °C)-99.7 °F (37.6 °C)] 96.8 °F (36 °C)  Pulse:  [] 79  Resp:  [17-20] 18  SpO2:  [88 %-95 %] 93 %  BP: (123-214)/() 165/74     Weight: 93.1 kg (205 lb 3.2 oz)  Body mass index is 28.62 kg/m².    Intake/Output Summary (Last 24 hours) at 9/20/2018 1208  Last data filed at 9/20/2018 1000  Gross per 24 hour   Intake --   Output 2200 ml   Net -2200 ml      Physical Exam   Constitutional: He is oriented to person, place, and time. He appears well-developed. He has a sickly appearance. He appears ill. No distress.   Elderly, unkept   HENT:   Head: Normocephalic and atraumatic.   Nose: Nose normal.   Eyes: Conjunctivae and EOM are normal. Pupils are equal, round, and reactive to light. No scleral icterus.   Neck: Normal range of motion. Neck supple. No JVD present. No tracheal deviation present.   Cardiovascular: Normal rate, regular rhythm, normal heart sounds and intact distal pulses.   No murmur heard.  Pulmonary/Chest: Effort normal and breath sounds normal. No respiratory distress. He has no wheezes.   Abdominal: Soft. Bowel sounds are normal. He exhibits no distension. There is no tenderness.   obese   Genitourinary:   Genitourinary Comments: Urinating independently   Musculoskeletal: Normal range of motion. He exhibits edema (1+pitting to left foot.). He exhibits no deformity.   Amputation to right lower   Left foot wounds   Neurological: He is alert and oriented to person, place, and time. No cranial nerve deficit.        Skin: Skin is warm and dry. Capillary refill  takes 2 to 3 seconds. He is not diaphoretic.   Unkept, dusky 2nd toe, necrotic wound to 4th and 5th toes.     Psychiatric: He has a normal mood and affect. His behavior is normal. Judgment and thought content normal.   Nursing note and vitals reviewed.                Significant Labs:   Recent Lab Results       09/20/18  1115 09/20/18  1002 09/20/18  0608 09/20/18  0524 09/19/18  2048      Anion Gap    15      Baso #    0.01      Basophil%    0.1      BUN, Bld    27      Calcium    9.1      Chloride    99      CO2    17      Creatinine    1.7      Differential Method    Automated      eGFR if     48      eGFR if non     41  Comment:  Calculation used to obtain the estimated glomerular filtration  rate (eGFR) is the CKD-EPI equation.         Eos #    0.0      Eosinophil%    0.1      Glucose    274      Gran # (ANC)    11.5      Gran%    81.0      Hematocrit    38.7      Hemoglobin    13.1      Lymph #    1.4      Lymph%    9.8      MCH    29.4      MCHC    33.9      MCV    87      Mono #    1.3      Mono%    9.0      MPV    9.8      Platelets    247      POCT Glucose 384 379 261  293     Potassium    3.9      RBC    4.45      RDW    13.4      Sodium    131      Vancomycin, Random    13.6      WBC    14.14                  09/19/18  1725      Anion Gap      Baso #      Basophil%      BUN, Bld      Calcium      Chloride      CO2      Creatinine      Differential Method      eGFR if       eGFR if non       Eos #      Eosinophil%      Glucose      Gran # (ANC)      Gran%      Hematocrit      Hemoglobin      Lymph #      Lymph%      MCH      MCHC      MCV      Mono #      Mono%      MPV      Platelets      POCT Glucose 246     Potassium      RBC      RDW      Sodium      Vancomycin, Random      WBC          All pertinent labs within the past 24 hours have been reviewed.    Significant Imaging: I have reviewed all pertinent imaging results/findings within the  past 24 hours.    Assessment/Plan:      * Gas gangrene of foot    Vanc/zosyn  Podiatry consult  Source of sepsis  -Toe amputation scheduled , possible foot amputation    --TMA scheduled for         Bacteremia    --BC grew STAPHYLOCOCCUS AUREUS, sensitivities pending   --continue IV Vancomycin          Osteomyelitis of left foot    Vanc/zosyn  poditary consult   -Left toe amputation scheduled , possible left foot amputation might be required.    --TMA schedueled           Failure to thrive in adult    --registered dietitian following            Diabetes mellitus type 2, uncontrolled, with complications    Hgb A1c 8.0  --accuchecks and SSI  --Levemir 10U qd            Sepsis    Not severe sepsis. Patients kidney function baseline with kidney transplant and troponin elevation related to CKD.   Continue IV antx therapy with vanc and zosyn for suspected source of foot wound  --BC grew STAPHYLOCOCCUS AUREUS, sensitivities pending   --continue IV Vancomycin  Monitor labs        PVD (peripheral vascular disease)    --vascular and podiatry following  --Pt to have TMA tomorrow - refuses BKA  --continue daily  ASA          Chronic kidney disease (CKD), stage III (moderate)    Dr. Estrada with renal consulted due to patient with transplant.  He reports patient can stay here  Renal consulted   Monitor rfp            donor kidney transplant for DM 16    --Nephrology following  --see above        Essential hypertension    Hold bp meds at this time due to sepsis   Monitor trends and restart once need    -sepsis status improving.  -Restart BP meds.  -Monitor VS          VTE Risk Mitigation (From admission, onward)        Ordered     Place GOPI hose  Until discontinued      18     Place sequential compression device  Until discontinued      18     IP VTE HIGH RISK PATIENT  Once      18              THIAGO Salas-C  Department of Hospital Medicine   [facility]

## 2018-09-21 ENCOUNTER — ANESTHESIA EVENT (OUTPATIENT)
Dept: SURGERY | Facility: HOSPITAL | Age: 63
DRG: 853 | End: 2018-09-21
Payer: MEDICARE

## 2018-09-21 ENCOUNTER — ANESTHESIA (OUTPATIENT)
Dept: SURGERY | Facility: HOSPITAL | Age: 63
DRG: 853 | End: 2018-09-21
Payer: MEDICARE

## 2018-09-21 PROBLEM — I96 GANGRENE OF LEFT FOOT: Status: ACTIVE | Noted: 2018-09-21

## 2018-09-21 LAB
ANION GAP SERPL CALC-SCNC: 11 MMOL/L
BACTERIA BLD CULT: NORMAL
BASOPHILS # BLD AUTO: 0.01 K/UL
BASOPHILS NFR BLD: 0.1 %
BUN SERPL-MCNC: 26 MG/DL
CALCIUM SERPL-MCNC: 9 MG/DL
CHLORIDE SERPL-SCNC: 96 MMOL/L
CO2 SERPL-SCNC: 25 MMOL/L
CREAT SERPL-MCNC: 1.6 MG/DL
DIASTOLIC DYSFUNCTION: NO
DIFFERENTIAL METHOD: ABNORMAL
EOSINOPHIL # BLD AUTO: 0 K/UL
EOSINOPHIL NFR BLD: 0.2 %
ERYTHROCYTE [DISTWIDTH] IN BLOOD BY AUTOMATED COUNT: 13.6 %
EST. GFR  (AFRICAN AMERICAN): 51 ML/MIN/1.73 M^2
EST. GFR  (NON AFRICAN AMERICAN): 45 ML/MIN/1.73 M^2
GLUCOSE SERPL-MCNC: 274 MG/DL
HCT VFR BLD AUTO: 37.8 %
HGB BLD-MCNC: 12.4 G/DL
LYMPHOCYTES # BLD AUTO: 1 K/UL
LYMPHOCYTES NFR BLD: 8.9 %
MCH RBC QN AUTO: 28.9 PG
MCHC RBC AUTO-ENTMCNC: 32.8 G/DL
MCV RBC AUTO: 88 FL
MONOCYTES # BLD AUTO: 1 K/UL
MONOCYTES NFR BLD: 8.1 %
NEUTROPHILS # BLD AUTO: 9.7 K/UL
NEUTROPHILS NFR BLD: 82.7 %
PLATELET # BLD AUTO: 255 K/UL
PMV BLD AUTO: 10 FL
POCT GLUCOSE: 223 MG/DL (ref 70–110)
POCT GLUCOSE: 264 MG/DL (ref 70–110)
POCT GLUCOSE: 277 MG/DL (ref 70–110)
POCT GLUCOSE: 320 MG/DL (ref 70–110)
POTASSIUM SERPL-SCNC: 3.7 MMOL/L
RBC # BLD AUTO: 4.29 M/UL
RETIRED EF AND QEF - SEE NOTES: 60 (ref 55–65)
SODIUM SERPL-SCNC: 132 MMOL/L
WBC # BLD AUTO: 11.73 K/UL

## 2018-09-21 PROCEDURE — 88311 DECALCIFY TISSUE: CPT | Performed by: PATHOLOGY

## 2018-09-21 PROCEDURE — 25000003 PHARM REV CODE 250: Performed by: PODIATRIST

## 2018-09-21 PROCEDURE — 0Y6N0Z9 DETACHMENT AT LEFT FOOT, PARTIAL 1ST RAY, OPEN APPROACH: ICD-10-PCS | Performed by: PODIATRIST

## 2018-09-21 PROCEDURE — 87077 CULTURE AEROBIC IDENTIFY: CPT

## 2018-09-21 PROCEDURE — 71000033 HC RECOVERY, INTIAL HOUR: Performed by: PODIATRIST

## 2018-09-21 PROCEDURE — 25000003 PHARM REV CODE 250: Performed by: CLINIC/CENTER

## 2018-09-21 PROCEDURE — 37000008 HC ANESTHESIA 1ST 15 MINUTES: Performed by: PODIATRIST

## 2018-09-21 PROCEDURE — 21400001 HC TELEMETRY ROOM

## 2018-09-21 PROCEDURE — 63600175 PHARM REV CODE 636 W HCPCS: Performed by: EMERGENCY MEDICINE

## 2018-09-21 PROCEDURE — 25000003 PHARM REV CODE 250: Performed by: INTERNAL MEDICINE

## 2018-09-21 PROCEDURE — 99900035 HC TECH TIME PER 15 MIN (STAT)

## 2018-09-21 PROCEDURE — 36000706: Performed by: PODIATRIST

## 2018-09-21 PROCEDURE — 28805 AMPUTATION THRU METATARSAL: CPT | Mod: LT,,, | Performed by: PODIATRIST

## 2018-09-21 PROCEDURE — 0Y6N0ZF DETACHMENT AT LEFT FOOT, PARTIAL 5TH RAY, OPEN APPROACH: ICD-10-PCS | Performed by: PODIATRIST

## 2018-09-21 PROCEDURE — 93306 TTE W/DOPPLER COMPLETE: CPT | Mod: 26,,, | Performed by: INTERNAL MEDICINE

## 2018-09-21 PROCEDURE — 87070 CULTURE OTHR SPECIMN AEROBIC: CPT

## 2018-09-21 PROCEDURE — 94761 N-INVAS EAR/PLS OXIMETRY MLT: CPT

## 2018-09-21 PROCEDURE — 36415 COLL VENOUS BLD VENIPUNCTURE: CPT

## 2018-09-21 PROCEDURE — 63600175 PHARM REV CODE 636 W HCPCS: Performed by: INTERNAL MEDICINE

## 2018-09-21 PROCEDURE — 37000009 HC ANESTHESIA EA ADD 15 MINS: Performed by: PODIATRIST

## 2018-09-21 PROCEDURE — 25000242 PHARM REV CODE 250 ALT 637 W/ HCPCS: Performed by: NURSE PRACTITIONER

## 2018-09-21 PROCEDURE — 87075 CULTR BACTERIA EXCEPT BLOOD: CPT

## 2018-09-21 PROCEDURE — 0Y6N0ZC DETACHMENT AT LEFT FOOT, PARTIAL 3RD RAY, OPEN APPROACH: ICD-10-PCS | Performed by: PODIATRIST

## 2018-09-21 PROCEDURE — 97803 MED NUTRITION INDIV SUBSEQ: CPT

## 2018-09-21 PROCEDURE — 94640 AIRWAY INHALATION TREATMENT: CPT

## 2018-09-21 PROCEDURE — 85025 COMPLETE CBC W/AUTO DIFF WBC: CPT

## 2018-09-21 PROCEDURE — 87040 BLOOD CULTURE FOR BACTERIA: CPT

## 2018-09-21 PROCEDURE — 63600175 PHARM REV CODE 636 W HCPCS: Performed by: ANESTHESIOLOGY

## 2018-09-21 PROCEDURE — 87205 SMEAR GRAM STAIN: CPT

## 2018-09-21 PROCEDURE — 63600175 PHARM REV CODE 636 W HCPCS: Performed by: NURSE PRACTITIONER

## 2018-09-21 PROCEDURE — 99233 SBSQ HOSP IP/OBS HIGH 50: CPT | Mod: ,,, | Performed by: INTERNAL MEDICINE

## 2018-09-21 PROCEDURE — 36000707: Performed by: PODIATRIST

## 2018-09-21 PROCEDURE — 0Y6N0ZD DETACHMENT AT LEFT FOOT, PARTIAL 4TH RAY, OPEN APPROACH: ICD-10-PCS | Performed by: PODIATRIST

## 2018-09-21 PROCEDURE — 25000003 PHARM REV CODE 250: Performed by: NURSE PRACTITIONER

## 2018-09-21 PROCEDURE — 63600175 PHARM REV CODE 636 W HCPCS: Performed by: CLINIC/CENTER

## 2018-09-21 PROCEDURE — 27201423 OPTIME MED/SURG SUP & DEVICES STERILE SUPPLY: Performed by: PODIATRIST

## 2018-09-21 PROCEDURE — 80048 BASIC METABOLIC PNL TOTAL CA: CPT

## 2018-09-21 PROCEDURE — 88305 TISSUE EXAM BY PATHOLOGIST: CPT | Mod: 26,,, | Performed by: PATHOLOGY

## 2018-09-21 PROCEDURE — 0Y6N0ZB DETACHMENT AT LEFT FOOT, PARTIAL 2ND RAY, OPEN APPROACH: ICD-10-PCS | Performed by: PODIATRIST

## 2018-09-21 PROCEDURE — S0020 INJECTION, BUPIVICAINE HYDRO: HCPCS | Performed by: PODIATRIST

## 2018-09-21 PROCEDURE — 88311 DECALCIFY TISSUE: CPT | Mod: 26,,, | Performed by: PATHOLOGY

## 2018-09-21 PROCEDURE — 87186 SC STD MICRODIL/AGAR DIL: CPT

## 2018-09-21 PROCEDURE — 93306 TTE W/DOPPLER COMPLETE: CPT

## 2018-09-21 RX ORDER — FENTANYL CITRATE 50 UG/ML
INJECTION, SOLUTION INTRAMUSCULAR; INTRAVENOUS
Status: DISCONTINUED | OUTPATIENT
Start: 2018-09-21 | End: 2018-09-21

## 2018-09-21 RX ORDER — PHENYLEPHRINE HYDROCHLORIDE 10 MG/ML
INJECTION INTRAVENOUS
Status: DISCONTINUED | OUTPATIENT
Start: 2018-09-21 | End: 2018-09-21

## 2018-09-21 RX ORDER — ACETAMINOPHEN 10 MG/ML
INJECTION, SOLUTION INTRAVENOUS
Status: DISCONTINUED | OUTPATIENT
Start: 2018-09-21 | End: 2018-09-21

## 2018-09-21 RX ORDER — SODIUM CHLORIDE 9 MG/ML
INJECTION, SOLUTION INTRAVENOUS CONTINUOUS PRN
Status: DISCONTINUED | OUTPATIENT
Start: 2018-09-21 | End: 2018-09-21

## 2018-09-21 RX ORDER — LIDOCAINE HYDROCHLORIDE 10 MG/ML
INJECTION INFILTRATION; PERINEURAL
Status: DISCONTINUED | OUTPATIENT
Start: 2018-09-21 | End: 2018-09-21

## 2018-09-21 RX ORDER — MORPHINE SULFATE 4 MG/ML
2 INJECTION, SOLUTION INTRAMUSCULAR; INTRAVENOUS ONCE
Status: COMPLETED | OUTPATIENT
Start: 2018-09-21 | End: 2018-09-21

## 2018-09-21 RX ORDER — BUPIVACAINE HYDROCHLORIDE 5 MG/ML
INJECTION, SOLUTION EPIDURAL; INTRACAUDAL
Status: DISCONTINUED | OUTPATIENT
Start: 2018-09-21 | End: 2018-09-21 | Stop reason: HOSPADM

## 2018-09-21 RX ORDER — CISATRACURIUM BESYLATE 2 MG/ML
INJECTION, SOLUTION INTRAVENOUS
Status: DISCONTINUED | OUTPATIENT
Start: 2018-09-21 | End: 2018-09-21

## 2018-09-21 RX ORDER — SUCCINYLCHOLINE CHLORIDE 20 MG/ML
INJECTION INTRAMUSCULAR; INTRAVENOUS
Status: DISCONTINUED | OUTPATIENT
Start: 2018-09-21 | End: 2018-09-21

## 2018-09-21 RX ORDER — DEXAMETHASONE SODIUM PHOSPHATE 4 MG/ML
INJECTION, SOLUTION INTRA-ARTICULAR; INTRALESIONAL; INTRAMUSCULAR; INTRAVENOUS; SOFT TISSUE
Status: DISCONTINUED | OUTPATIENT
Start: 2018-09-21 | End: 2018-09-21

## 2018-09-21 RX ORDER — ETOMIDATE 2 MG/ML
INJECTION INTRAVENOUS
Status: DISCONTINUED | OUTPATIENT
Start: 2018-09-21 | End: 2018-09-21

## 2018-09-21 RX ORDER — GLYCOPYRROLATE 0.2 MG/ML
INJECTION INTRAMUSCULAR; INTRAVENOUS
Status: DISCONTINUED | OUTPATIENT
Start: 2018-09-21 | End: 2018-09-21

## 2018-09-21 RX ORDER — PROPOFOL 10 MG/ML
VIAL (ML) INTRAVENOUS
Status: DISCONTINUED | OUTPATIENT
Start: 2018-09-21 | End: 2018-09-21

## 2018-09-21 RX ORDER — MIDAZOLAM HYDROCHLORIDE 1 MG/ML
INJECTION, SOLUTION INTRAMUSCULAR; INTRAVENOUS
Status: DISCONTINUED | OUTPATIENT
Start: 2018-09-21 | End: 2018-09-21

## 2018-09-21 RX ADMIN — GABAPENTIN 100 MG: 100 CAPSULE ORAL at 09:09

## 2018-09-21 RX ADMIN — ROBINUL 0.2 MG: 0.2 INJECTION INTRAMUSCULAR; INTRAVENOUS at 01:09

## 2018-09-21 RX ADMIN — ETOMIDATE 10 MG: 2 INJECTION, SOLUTION INTRAVENOUS at 12:09

## 2018-09-21 RX ADMIN — MIDAZOLAM 2 MG: 1 INJECTION INTRAMUSCULAR; INTRAVENOUS at 11:09

## 2018-09-21 RX ADMIN — ARFORMOTEROL TARTRATE 15 MCG: 15 SOLUTION RESPIRATORY (INHALATION) at 07:09

## 2018-09-21 RX ADMIN — PROPOFOL 50 MG: 10 INJECTION, EMULSION INTRAVENOUS at 12:09

## 2018-09-21 RX ADMIN — PREDNISONE 5 MG: 5 TABLET ORAL at 04:09

## 2018-09-21 RX ADMIN — HYDROCODONE BITARTRATE AND ACETAMINOPHEN 1 TABLET: 10; 325 TABLET ORAL at 04:09

## 2018-09-21 RX ADMIN — INSULIN HUMAN 6 UNITS: 100 INJECTION, SOLUTION PARENTERAL at 10:09

## 2018-09-21 RX ADMIN — PHENYLEPHRINE HYDROCHLORIDE 100 MCG: 10 INJECTION INTRAVENOUS at 01:09

## 2018-09-21 RX ADMIN — BUDESONIDE 0.5 MG: 0.5 SUSPENSION RESPIRATORY (INHALATION) at 07:09

## 2018-09-21 RX ADMIN — HYDROCODONE BITARTRATE AND ACETAMINOPHEN 1 TABLET: 10; 325 TABLET ORAL at 03:09

## 2018-09-21 RX ADMIN — PANTOPRAZOLE SODIUM 40 MG: 40 TABLET, DELAYED RELEASE ORAL at 04:09

## 2018-09-21 RX ADMIN — TACROLIMUS 1.5 MG: 1 CAPSULE ORAL at 05:09

## 2018-09-21 RX ADMIN — FENTANYL CITRATE 100 MCG: 50 INJECTION, SOLUTION INTRAMUSCULAR; INTRAVENOUS at 12:09

## 2018-09-21 RX ADMIN — AMLODIPINE BESYLATE 2.5 MG: 2.5 TABLET ORAL at 04:09

## 2018-09-21 RX ADMIN — SODIUM BICARBONATE 650 MG TABLET 2600 MG: at 04:09

## 2018-09-21 RX ADMIN — INSULIN ASPART 3 UNITS: 100 INJECTION, SOLUTION INTRAVENOUS; SUBCUTANEOUS at 04:09

## 2018-09-21 RX ADMIN — INSULIN DETEMIR 10 UNITS: 100 INJECTION, SOLUTION SUBCUTANEOUS at 09:09

## 2018-09-21 RX ADMIN — GABAPENTIN 100 MG: 100 CAPSULE ORAL at 04:09

## 2018-09-21 RX ADMIN — HYDROCODONE BITARTRATE AND ACETAMINOPHEN 1 TABLET: 10; 325 TABLET ORAL at 09:09

## 2018-09-21 RX ADMIN — HYDROCORTISONE SODIUM SUCCINATE 50 MG: 100 INJECTION, POWDER, FOR SOLUTION INTRAMUSCULAR; INTRAVENOUS at 01:09

## 2018-09-21 RX ADMIN — PIPERACILLIN SODIUM AND TAZOBACTAM SODIUM 4.5 G: 4; .5 INJECTION, POWDER, LYOPHILIZED, FOR SOLUTION INTRAVENOUS at 04:09

## 2018-09-21 RX ADMIN — SUCCINYLCHOLINE CHLORIDE 100 MG: 20 INJECTION, SOLUTION INTRAMUSCULAR; INTRAVENOUS at 12:09

## 2018-09-21 RX ADMIN — VANCOMYCIN HYDROCHLORIDE 1250 MG: 10 INJECTION, POWDER, LYOPHILIZED, FOR SOLUTION INTRAVENOUS at 02:09

## 2018-09-21 RX ADMIN — OLANZAPINE 5 MG: 5 TABLET, ORALLY DISINTEGRATING ORAL at 09:09

## 2018-09-21 RX ADMIN — CISATRACURIUM BESYLATE 1 MG: 2 INJECTION INTRAVENOUS at 12:09

## 2018-09-21 RX ADMIN — ACETAMINOPHEN 1000 MG: 10 INJECTION, SOLUTION INTRAVENOUS at 12:09

## 2018-09-21 RX ADMIN — TACROLIMUS 1.5 MG: 1 CAPSULE ORAL at 07:09

## 2018-09-21 RX ADMIN — INSULIN ASPART 2 UNITS: 100 INJECTION, SOLUTION INTRAVENOUS; SUBCUTANEOUS at 09:09

## 2018-09-21 RX ADMIN — MORPHINE SULFATE 2 MG: 4 INJECTION INTRAVENOUS at 10:09

## 2018-09-21 RX ADMIN — ASPIRIN 81 MG: 81 TABLET, COATED ORAL at 04:09

## 2018-09-21 RX ADMIN — LIDOCAINE HYDROCHLORIDE 40 MG: 10 INJECTION, SOLUTION INFILTRATION; PERINEURAL at 12:09

## 2018-09-21 RX ADMIN — SODIUM CHLORIDE: 9 INJECTION, SOLUTION INTRAVENOUS at 11:09

## 2018-09-21 RX ADMIN — PIPERACILLIN SODIUM AND TAZOBACTAM SODIUM 4.5 G: 4; .5 INJECTION, POWDER, LYOPHILIZED, FOR SOLUTION INTRAVENOUS at 11:09

## 2018-09-21 RX ADMIN — ONDANSETRON 4 MG: 2 INJECTION INTRAMUSCULAR; INTRAVENOUS at 12:09

## 2018-09-21 RX ADMIN — HYDROCODONE BITARTRATE AND ACETAMINOPHEN 1 TABLET: 10; 325 TABLET ORAL at 07:09

## 2018-09-21 RX ADMIN — PIPERACILLIN SODIUM AND TAZOBACTAM SODIUM 4.5 G: 4; .5 INJECTION, POWDER, LYOPHILIZED, FOR SOLUTION INTRAVENOUS at 09:09

## 2018-09-21 RX ADMIN — SODIUM BICARBONATE 650 MG TABLET 2600 MG: at 09:09

## 2018-09-21 RX ADMIN — DEXAMETHASONE SODIUM PHOSPHATE 4 MG: 4 INJECTION, SOLUTION INTRA-ARTICULAR; INTRALESIONAL; INTRAMUSCULAR; INTRAVENOUS; SOFT TISSUE at 12:09

## 2018-09-21 NOTE — PROGRESS NOTES
[facility] Hospital Medicine  Progress Note    Patient Name: Lavelle Ladd  MRN: [id number]  Patient Class: IP- Inpatient   Admission Date: 9/18/2018  Length of Stay: 3 days  Attending Physician: Stephanie Mueller MD  Primary Care Provider: Rishabh Esteban MD        Subjective:     Principal Problem:Gas gangrene of foot    HPI:  HPI limited due to patient confusion and obtained from Er records.  Lavelle Ladd is a 63 y.o. male kidney transplant patient with hx of HTN, CAD, diastolic heart failure, Type 2 DM with diabetic neuropathy, ESRD, PVD, COPD who was brought to ED via EMS secondary to failure to thrive which has been present for an unknown amount of time. EMS reported that there was human and dog feces in patient's room and that patient does not want to take his medications. His CBG was 348. Patient with lower foot wound to left. Patient evalauted in ER. WBC 16.72, Na 127, Anion 18, Cr. 1.9, Trop 0.036, ,  CT renal:1. Negative for acute process involving the abdomen or pelvis. Negative for inflammatory changes. Normal appendix. Negative for renal stone disease or hydronephrosis, in this patient who has atrophic native kidneys and a right lower quadrant transplant kidney.2. Again seen is mild prominence of the transplant kidney pelvocaliceal system, without dilation of the ureter, likely a chronic finding.3. Mild distention of the gallbladder, which is otherwise normal, nonspecific finding.4. Fluid within the nondilated colon, which can be seen in patients with diarrhea or gastroenteritis. Clinical correlation is advised.5. Dependent atelectasis in the lung bases.6. Moderate sliding-type hiatal hernia with possible distal esophageal thickening. Does the patient have symptoms of esophagitis?7. Stable findings as noted above.  Chest Xray1. There is a mild amount of haziness in the lateral aspect of the base of the left hemithorax.  This is characteristic of atelectasis or subtle  pneumonia.2. The left costophrenic angle is blunted.  This is characteristic of a tiny pleural effusion.3. There is a mild amount of levoconvex curvature of the thoracic spine.4. There is mild cardiomegaly. Xray left foot:1.  Progressive erosive changes with bone loss involving the 1st distal phalanx concerning for osteomyelitis.2.  Air bubbles within the left 5th toe, concerning for possible infectious process or gangrene.  Clinical correlation is advised.3.  Progressive soft tissue loss involving the 4th toe, which could also indicate gangrene.  Clinical correlation is advised. Hosptial medicine consulted. Patient admitted    Hospital Course:  Patient admitted to Telemetry gas gangrene of the left foot under the care of Hospital Medicine. Vascular surgery was consulted who recommended BKA. Podiatry was consulted who also recommended BKA but pt refuses, instead agreeing to TMA.     Interval History: TMA today. BC+ - will need 6 weeks IV abx. ID consulted. Repeat BC drawn 9/21. Pt's surgical wound to be flushed BID - will likely close early next week, pending clinical course. Pt refuses to discuss SNF or rehab saying he will go home on DC. Case Management aware.    Review of Systems   Constitutional: Negative for chills, diaphoresis, fatigue and fever.   HENT: Negative for congestion, postnasal drip, rhinorrhea and sore throat.    Eyes: Negative for pain and visual disturbance.   Respiratory: Negative for cough, shortness of breath and wheezing.    Cardiovascular: Negative for chest pain, palpitations and leg swelling.   Gastrointestinal: Negative for abdominal distention, abdominal pain, constipation, diarrhea, nausea and vomiting.   Endocrine: Negative for cold intolerance and heat intolerance.   Genitourinary: Negative for difficulty urinating, dysuria and hematuria.   Musculoskeletal: Positive for back pain.   Skin: Positive for wound.   Allergic/Immunologic: Positive for immunocompromised state.   Neurological:  Negative for dizziness, syncope, speech difficulty, weakness, light-headedness and headaches.   Hematological: Negative.    Psychiatric/Behavioral: Negative for agitation, behavioral problems and confusion.     Objective:     Vital Signs (Most Recent):  Temp: 98.8 °F (37.1 °C) (09/21/18 1327)  Pulse: 79 (09/21/18 1351)  Resp: 12 (09/21/18 1351)  BP: 131/75 (09/21/18 1351)  SpO2: (!) 94 % (09/21/18 1351) Vital Signs (24h Range):  Temp:  [97.4 °F (36.3 °C)-98.8 °F (37.1 °C)] 98.8 °F (37.1 °C)  Pulse:  [70-85] 79  Resp:  [10-26] 12  SpO2:  [90 %-100 %] 94 %  BP: (103-185)/(65-97) 131/75     Weight: 93 kg (205 lb 0.4 oz)  Body mass index is 28.6 kg/m².    Intake/Output Summary (Last 24 hours) at 9/21/2018 1401  Last data filed at 9/21/2018 1323  Gross per 24 hour   Intake 350 ml   Output 500 ml   Net -150 ml      Physical Exam   Constitutional: He is oriented to person, place, and time. He appears well-developed. He has a sickly appearance. He appears ill. No distress.   Elderly, unkept   HENT:   Head: Normocephalic and atraumatic.   Nose: Nose normal.   Mouth/Throat: Oropharynx is clear and moist.   Eyes: Conjunctivae and EOM are normal. Pupils are equal, round, and reactive to light. No scleral icterus.   Neck: Normal range of motion. Neck supple. No JVD present. No tracheal deviation present.   Cardiovascular: Normal rate, regular rhythm, normal heart sounds and intact distal pulses.   No murmur heard.  Pulmonary/Chest: Effort normal and breath sounds normal. No respiratory distress. He has no wheezes.   Abdominal: Soft. Bowel sounds are normal. He exhibits no distension. There is no tenderness.   obese   Genitourinary:   Genitourinary Comments: Urinating independently   Musculoskeletal: Normal range of motion. He exhibits edema (1+pitting to left foot.). He exhibits no deformity.   Amputation to right lower   Left foot wounds   Neurological: He is alert and oriented to person, place, and time. No cranial nerve  deficit.        Skin: Skin is warm and dry. Capillary refill takes 2 to 3 seconds. He is not diaphoretic.   Unkept, dusky 2nd toe, necrotic wound to 4th and 5th toes.     Psychiatric: He has a normal mood and affect. His behavior is normal. Judgment and thought content normal.   Nursing note and vitals reviewed.      Significant Labs:   Recent Lab Results       09/21/18  1351 09/21/18  1011 09/21/18  0535 09/20/18  2107 09/20/18  1730      Anion Gap   11       Baso #   0.01       Basophil%   0.1       BUN, Bld   26       Calcium   9.0       Chloride   96       CO2   25       Creatinine   1.6       Differential Method   Automated       eGFR if    51       eGFR if non    45  Comment:  Calculation used to obtain the estimated glomerular filtration  rate (eGFR) is the CKD-EPI equation.          Eos #   0.0       Eosinophil%   0.2       Glucose   274       Gran # (ANC)   9.7       Gran%   82.7       Hematocrit   37.8       Hemoglobin   12.4       Lymph #   1.0       Lymph%   8.9       MCH   28.9       MCHC   32.8       MCV   88       Mono #   1.0       Mono%   8.1       MPV   10.0       Platelets   255       POCT Glucose 223 264  300 309     Potassium   3.7       RBC   4.29       RDW   13.6       Sodium   132       WBC   11.73                     All pertinent labs within the past 24 hours have been reviewed.    Significant Imaging: I have reviewed all pertinent imaging results/findings within the past 24 hours.    Assessment/Plan:      * Gas gangrene of foot    Vanc/zosyn  Podiatry consult  Source of sepsis    --TMA scheduled for 9/21  --continue IV vanc, dc IV Zosyn  --BC + staph aureus  --ID consulted  --repeat BC drawn 9/21        Bacteremia    --BC grew STAPHYLOCOCCUS AUREUS, sensitivities pending   --continue IV Vancomycin  --ID consulted  --repeat bc drawn 9/21          Osteomyelitis of left foot    Vanc/zosyn  poditary consult   --TMA schedueled 9/21          Failure to thrive in  adult    --registered dietitian following            Diabetes mellitus type 2, uncontrolled, with complications    Hgb A1c 8.0  --accuchecks and SSI  --Levemir 15U qd            Sepsis    Not severe sepsis. Patients kidney function baseline with kidney transplant and troponin elevation related to CKD.   Continue IV antx therapy with vanc and zosyn for suspected source of foot wound  --BC grew STAPHYLOCOCCUS AUREUS, sensitivities pending   --continue IV Vancomycin, DC IV Zosyn  --ID consulted  --repeat bc drawn   Monitor labs        PVD (peripheral vascular disease)    --vascular and podiatry following  --TMA today ()  --continue daily  ASA          Chronic kidney disease (CKD), stage III (moderate)    Dr. Estrada with renal consulted due to patient with transplant.  He reports patient can stay here  Renal consulted   Monitor rfp            donor kidney transplant for DM 14    --Nephrology following  --see above        Essential hypertension    Hold bp meds at this time due to sepsis   Monitor trends and restart once need    -sepsis status improving.  -Restart BP meds.  -Monitor VS          VTE Risk Mitigation (From admission, onward)        Ordered     Place GOPI hose  Until discontinued      18     Place sequential compression device  Until discontinued      18     IP VTE HIGH RISK PATIENT  Once      18              FAN Salas  Department of Hospital Medicine   [facility]

## 2018-09-21 NOTE — PROGRESS NOTES
Pt s/p open TMA left lower extremity with gangrene and purulent drainage noted intraoperatively. Wound packed open   Nursing dressing changes BID   Dr. Magaña on call this weekend and will follow up at bedside   Tentative wound closure Monday pending clinical improvement     Report Electronically Signed By:     Karla Wheeler DPM   Podiatry  [facility]  9/21/2018

## 2018-09-21 NOTE — ASSESSMENT & PLAN NOTE
Patient's renal function stable with creatinine at 1.6 (his baseline).     Continue immunosuppression with Prograf, prednisone.     Will monitor closely with repeat renal panel.    Tacrolimus at goal.

## 2018-09-21 NOTE — ASSESSMENT & PLAN NOTE
Not severe sepsis. Patients kidney function baseline with kidney transplant and troponin elevation related to CKD.   Continue IV antx therapy with vanc and zosyn for suspected source of foot wound  --BC grew STAPHYLOCOCCUS AUREUS, sensitivities pending   --continue IV Vancomycin, DC IV Zosyn  --ID consulted  --repeat bc drawn 9/21  Monitor labs    9/22  Monitor BC  PICC  IV abx x 2 weeks  ID  [employer]

## 2018-09-21 NOTE — ASSESSMENT & PLAN NOTE
--BC grew STAPHYLOCOCCUS AUREUS, sensitivities pending   --continue IV Vancomycin  --ID consulted  --repeat bc drawn 9/21

## 2018-09-21 NOTE — PROGRESS NOTES
"Recommendations   Recommendation/Intervention 1. ADAT to cardiac, diabetic after procedure 2. RD to monitor    Goals Meal intake of >50% of meals after NPO    Nutrition Goal Status new   Communication of RD Recs (POC, sticky note)            09/21/18 1100   Reason for Assessment   Reason for Assessment F/u    Diagnosis (Heart block)   Relevant Medical History (CHF, COPD, DM, HTN)   General Information Comments Unable to speak w/pt d/t pt being in procedure. Will attempt to interview and educate pt at f/u. Per previous RD notes, pt was educated on diabetic diet on  7/10/18.   Nutrition Discharge Planning D/c on diabetic cardiac diet   Anthropometrics   Height Method Stated   Height 5' 11" (1.803 m)   Height (inches) 71 in   Weight Method Standard Scale   Weight 93 kg (205 lb 0.4 oz)   Weight (lb) 205.03 lb   Ideal Body Weight (IBW), Male 172 lb   % Ideal Body Weight, Male (lb) 119.2 lb   BMI (Calculated) 28.7   BMI Grade 25 - 29.9 - overweight   Labs/Tests/Procedures/Meds   Pertinent Labs Reviewed Reviewed  BMP  Lab Results   Component Value Date     (L) 09/21/2018    K 3.7 09/21/2018    CL 96 09/21/2018    CO2 25 09/21/2018    BUN 26 (H) 09/21/2018    CREATININE 1.6 (H) 09/21/2018    CALCIUM 9.0 09/21/2018    ANIONGAP 11 09/21/2018    ESTGFRAFRICA 51 (A) 09/21/2018    EGFRNONAA 45 (A) 09/21/2018     Lab Results   Component Value Date    CALCIUM 9.0 09/21/2018    PHOS 2.1 (L) 09/18/2018     Lab Results   Component Value Date    ALBUMIN 2.5 (L) 09/18/2018     Recent Labs   Lab  09/21/18   1351   POCTGLUCOSE  223*     Lab Results   Component Value Date    HGBA1C 8.0 (H) 09/19/2018        Pertinent Medications Reviewed reviewed   Physical Findings/Assessment   Oral/Mouth Cavity tooth/teeth missing   Skin (Matthew score = 18)   Estimated/Assessed Needs   Weight Used For Calorie Calculations 93 kg (205 lb 0.4 oz)   Energy Calorie Requirements (kcal) 2084   Energy Need Method Grayson-St Jeor  (x 1.2)   RMR " (Clarke-St. Jeor Equation) 1737.12   Protein Requirements 75-93 g, 0.8-1.0 g/kg   Weight Used For Protein Calculations 93 kg (205 lb 0.4 oz)   0.8 gm Protein (gm) 74.56   1.0 gm Protein (gm) 93.19   Fluid Requirements (mL) 1 mL/kcal or per MD   Fluid Need Method RDA Method   RDA Method (mL) 2084   Nutrition Prescription Ordered   Current Diet Order (NPO )   Nutrition Risk   Level of Risk/Frequency of Follow-up (f/u 2x/week )   Monitor and Evaluation   Food and Nutrient Intake energy intake   Food and Nutrient Adminstration diet order   Anthropometric Measurements height/length;weight   Nutrition Follow-up   RD Follow-up? Yes   Next Date to be Seen by RD 09/24/18         Aakash Puckett, Dietetic intern      I certify that I directed the dietetic intern in service delivery and guided them using my skilled judgment. As the cosigning dietitian, I have reviewed the dietetic interns documentation and am responsible for the treatment, assessment, and plan.

## 2018-09-21 NOTE — SUBJECTIVE & OBJECTIVE
Interval History:     9/21/18: s/p left TMA today. Patient reports fatigue and mild abdominal pain (he had fall and fell on his abdomen, work-up was negative).         Review of patient's allergies indicates:  No Known Allergies  Current Facility-Administered Medications   Medication Frequency    acetaminophen tablet 650 mg Q4H PRN    amLODIPine tablet 2.5 mg Daily    arformoterol nebulizer solution 15 mcg Q12H    aspirin EC tablet 81 mg Daily    budesonide nebulizer solution 0.5 mg Q12H    dextrose 50% injection 12.5 g PRN    dextrose 50% injection 25 g PRN    gabapentin capsule 100 mg TID    glucagon (human recombinant) injection 1 mg PRN    glucose chewable tablet 16 g PRN    glucose chewable tablet 24 g PRN    hydrALAZINE injection 10 mg Q8H PRN    HYDROcodone-acetaminophen  mg per tablet 1 tablet Q4H PRN    insulin aspart U-100 pen 0-5 Units QID (AC + HS) PRN    insulin detemir U-100 pen 10 Units QHS    nozaseptin (NOZIN) nasal  BID    olanzapine zydis disintegrating tablet 5 mg QHS    ondansetron injection 4 mg Q8H PRN    pantoprazole EC tablet 40 mg Daily    piperacillin-tazobactam 4.5 g in dextrose 5 % 100 mL IVPB (ready to mix system) Q8H    predniSONE tablet 5 mg Daily    sodium bicarbonate tablet 2,600 mg BID    sodium chloride 0.9% flush 5 mL PRN    tacrolimus capsule 1.5 mg BID    traZODone tablet 50 mg Nightly PRN    vancomycin (VANCOCIN) 1,250 mg in dextrose 5 % 250 mL IVPB Q18H       Objective:     Vital Signs (Most Recent):  Temp: 98.6 °F (37 °C) (09/21/18 1015)  Pulse: 80 (09/21/18 1015)  Resp: 19 (09/21/18 1015)  BP: (!) 154/97 (09/21/18 1015)  SpO2: (!) 90 % (09/21/18 1015)  O2 Device (Oxygen Therapy): nasal cannula (09/21/18 1015) Vital Signs (24h Range):  Temp:  [96.8 °F (36 °C)-98.6 °F (37 °C)] 98.6 °F (37 °C)  Pulse:  [70-85] 80  Resp:  [18-20] 19  SpO2:  [90 %-95 %] 90 %  BP: (113-185)/(65-97) 154/97     Weight: 93 kg (205 lb 0.4 oz) (09/21/18  1015)  Body mass index is 28.6 kg/m².  Body surface area is 2.16 meters squared.    I/O last 3 completed shifts:  In: -   Out: 2400 [Urine:2400]    Physical Exam   Constitutional: He is oriented to person, place, and time. He appears well-developed. He is cooperative.   HENT:   Head: Normocephalic.   Nose: No rhinorrhea.   Mouth/Throat: Mucous membranes are normal. No oropharyngeal exudate.   Eyes: Pupils are equal, round, and reactive to light.   Neck: No thyroid mass present.   Cardiovascular: Normal rate, regular rhythm, S1 normal, S2 normal and intact distal pulses.   Pulmonary/Chest: Effort normal. No respiratory distress. He has no wheezes.   Abdominal: Soft. Bowel sounds are normal. He exhibits no distension. There is no tenderness. No hernia.   Musculoskeletal: Tenderness: lastpt.   S/p left TMA.    S/p right BKA.   Lymphadenopathy:     He has no cervical adenopathy.   Neurological: He is alert and oriented to person, place, and time.   Skin: Skin is warm and dry.   Psychiatric: He has a normal mood and affect.       Significant Labs:  Lab Results   Component Value Date    CREATININE 1.6 (H) 09/21/2018    BUN 26 (H) 09/21/2018     (L) 09/21/2018    K 3.7 09/21/2018    CL 96 09/21/2018    CO2 25 09/21/2018     Lab Results   Component Value Date    .0 (H) 10/30/2017    CALCIUM 9.0 09/21/2018    CAION 1.10 05/30/2016    PHOS 2.1 (L) 09/18/2018     Lab Results   Component Value Date    ALBUMIN 2.5 (L) 09/18/2018     Lab Results   Component Value Date    WBC 11.73 09/21/2018    HGB 12.4 (L) 09/21/2018    HCT 37.8 (L) 09/21/2018    MCV 88 09/21/2018     09/21/2018     Recent Labs   Lab  09/18/18   1446   MG  1.9     Tacrolimus: 5.3 (9/19/18)      Significant Imaging:  Imaging Results          X-Ray Foot Complete Left (Final result)  Result time 09/18/18 19:50:43    Final result by Nicolas Sawyer MD (09/18/18 19:50:43)                 Impression:      1.  Progressive erosive changes with bone  loss involving the 1st distal phalanx concerning for osteomyelitis.    2.  Air bubbles within the left 5th toe, concerning for possible infectious process or gangrene.  Clinical correlation is advised.    3.  Progressive soft tissue loss involving the 4th toe, which could also indicate gangrene.  Clinical correlation is advised.    4.  Persistent healing fracture of the 2nd proximal phalanx.  Other stable findings as noted above.      Electronically signed by: Nicolas Sawyer MD  Date:    09/18/2018  Time:    19:50             Narrative:    EXAMINATION:  XR FOOT COMPLETE 3 VIEW LEFT    CLINICAL HISTORY:  Pain in left foot.  Gangrene, not elsewhere classified    TECHNIQUE:  AP, lateral and oblique views of the left foot were performed.    COMPARISON:  August 16, 2018    FINDINGS:  There is been interval bone loss involving the distal 1st phalanx, with overlying soft tissue defect.  Healing fracture of the 2nd proximal phalanx again seen.  There has been progressive soft tissue loss overlying the 4th toe and there are air bubbles in the 5th toe soft tissues.  No other bone loss is seen.    Plantar and Achilles calcaneal spurs.  Osteopenia.  Vascular calcifications.  Accessory ossicle adjacent to the navicular bone.                               CT Renal Stone Study ABD Pelvis WO (Final result)  Result time 09/18/18 18:31:38    Final result by Nicolas Sawyer MD (09/18/18 18:31:38)                 Impression:      1.  Negative for acute process involving the abdomen or pelvis.  Negative for inflammatory changes.  Normal appendix.  Negative for renal stone disease or hydronephrosis, in this patient who has atrophic native kidneys and a right lower quadrant transplant kidney.    2. Again seen is mild prominence of the transplant kidney pelvocaliceal system, without dilation of the ureter, likely a chronic finding.    3.  Mild distention of the gallbladder, which is otherwise normal, nonspecific finding.    4.  Fluid within  the nondilated colon, which can be seen in patients with diarrhea or gastroenteritis.  Clinical correlation is advised.    5.  Dependent atelectasis in the lung bases.    6.  Moderate sliding-type hiatal hernia with possible distal esophageal thickening.  Does the patient have symptoms of esophagitis?    7.  Stable findings as noted above.    All CT scans at this facility are performed  using dose modulation techniques as appropriate to performed exam including the following:  automated exposure control; adjustment of mA and/or kV according to the patients size (this includes techniques or standardized protocols for targeted exams where dose is matched to indication/reason for exam: i.e. extremities or head);  iterative reconstruction technique.      Electronically signed by: Nicolas Sawyer MD  Date:    09/18/2018  Time:    18:31             Narrative:    EXAMINATION:  CT RENAL STONE STUDY ABD PELVIS WO    CLINICAL HISTORY:  Flank pain, stone disease suspected;    TECHNIQUE:  Axial images through the abdomen and pelvis were obtained without the use of IV contrast.  Sagittal and coronal reconstructions are provided for review.  Oral contrast was not utilized.    COMPARISON:  December 11, 2016    FINDINGS:  LUNG BASES: Respiratory motion artifact is present on a number of images.  Subtle abnormalities could be overlooked.  Stable scarring or atelectasis in the inferior lingula and middle lobe.  Lung bases are otherwise clear.  Negative for pleural effusions.  There is a small amount of pericardial fluid.  Coronary artery calcifications.  Moderate size hiatal hernia with thickening of the distal esophagus.    ABDOMEN: The gallbladder is mildly distended.  No surrounding inflammatory changes or wall thickening is seen.  Bilateral perinephric stranding densities noted, with mild atrophy of both kidneys again seen.  Vascular calcifications within the spleen.  Again seen is mild prominence of the biliary tree measuring up  to 8 mm.  The liver, spleen and gallbladder otherwise appear normal.  The pancreas is unremarkable.  Kidneys and adrenal glands are otherwise normal.  Negative for hydronephrosis or renal stone disease.  Significant vascular calcifications are present within the renal pelves, with bilateral renal sinus lipomatosis again seen.    There remains to be a transplant kidney in the right anterior hemipelvis.  Mild prominence of the renal collecting system is again seen.  Negative for perinephric stranding densities.  Negative for definite renal stones.    Negative for adenopathy, free fluid or inflammatory change noted within the abdomen or pelvis.  Vascular calcifications are present without aneurysmal changes, with the aorta measuring a maximum of 2.9 cm.    The small bowel appears normal. Normal appendix.  There is fluid within the proximal and mid colon, without dilation or surrounding inflammatory changes.    PELVIS: The urinary bladder is mildly thick wall, a stable finding.  Prostate calcifications.  The   pelvic organs are breast of e-mail.  There are pelvic phleboliths.    No significant osseous abnormality is identified.  Negative for significant spinal canal stenosis. Similar degree of degenerative disc changes throughout the thoracic and lumbar spine, most significantly involving L3/L4.    Negative for groin adenopathy.    Postoperative changes involve the right pelvic abdominal wall from the transplant kidney placement.  The abdominal wall is intact.                               X-Ray Chest 1 View (Final result)  Result time 09/18/18 17:03:13    Final result by ANA CRISTINA Joseph Sr., MD (09/18/18 17:03:13)                 Impression:      1. There is a mild amount of haziness in the lateral aspect of the base of the left hemithorax.  This is characteristic of atelectasis or subtle pneumonia.  2. The left costophrenic angle is blunted.  This is characteristic of a tiny pleural effusion.  3. There is a mild  amount of levoconvex curvature of the thoracic spine.  4. There is mild cardiomegaly.  .      Electronically signed by: Porter Joseph MD  Date:    09/18/2018  Time:    17:03             Narrative:    EXAMINATION:  XR CHEST 1 VIEW    CLINICAL HISTORY:  Adult failure to thrive    COMPARISON:  07/20/2018    FINDINGS:  The size of the heart is prominent.  There is a mild amount of haziness in the lateral aspect of the base of the left hemithorax.  The right lung is clear.  There is no pneumothorax.  The left costophrenic angle is blunted.  The right costophrenic angle is sharp.  There is a mild amount of levoconvex curvature of the thoracic spine.

## 2018-09-21 NOTE — ANESTHESIA POSTPROCEDURE EVALUATION
"Anesthesia Post Evaluation    Patient: Lavelle Ladd    Procedure(s) Performed: Procedure(s) (LRB):  AMPUTATION, FOOT, TRANSMETATARSAL (Left)    Final Anesthesia Type: general  Patient location during evaluation: PACU  Patient participation: Yes- Able to Participate  Level of consciousness: awake and alert  Post-procedure vital signs: reviewed and stable  Pain management: adequate  Airway patency: patent  PONV status at discharge: No PONV  Anesthetic complications: no      Cardiovascular status: blood pressure returned to baseline  Respiratory status: unassisted  Hydration status: euvolemic  Follow-up not needed.        Visit Vitals  /75   Pulse 79   Temp 37.1 °C (98.8 °F) (Oral)   Resp 12   Ht 5' 11" (1.803 m)   Wt 93 kg (205 lb 0.4 oz)   SpO2 (!) 94%   BMI 28.60 kg/m²       Pain/Shereen Score: Pain Assessment Performed: Yes (9/21/2018 10:17 AM)  Presence of Pain: denies (9/21/2018  1:45 PM)  Pain Rating Prior to Med Admin: 7 (9/21/2018 10:17 AM)  Pain Rating Post Med Admin: 0 (9/20/2018  6:27 PM)  Shereen Score: 10 (9/21/2018  1:45 PM)        "

## 2018-09-21 NOTE — NURSING
Chart reviewed for diabetes  Attempted to assess patient unable   Ultrasound in progress  9:40am   Re-attempted to assess unable as patient is preparing for surgery  Diabetes is not a new diagnosis

## 2018-09-21 NOTE — ASSESSMENT & PLAN NOTE
Vanc/zosyn  Podiatry consult  Source of sepsis    --TMA scheduled for 9/21  --continue IV vanc, dc IV Zosyn  --BC + staph aureus  --ID consulted  --repeat BC drawn 9/21 9/22  ABX  Podiatry  S/p TMA  ID  Plan for Home Support - PICC / WC / [facility]  CM on Consult

## 2018-09-21 NOTE — OP NOTE
[facility]  LONG OPERATIVE NOTE    Date: 9/21/2018   Patient: Lavelle Ladd   Medical Record Number: [id number]   Surgeon: Karla Wheeler DPM   Assistant: None  Preoperative Diagnosis: Left foot gangrene   Postoperative Diagnosis: Left foot gangrene  Surgical Procedure(s): Left Transmetatarsal Amputation   Pathology: deep wound cultures, left forefoot, Left 1st metatarsal and second metatarsal with clean margins marked  Anesthesia: General   Hemostasis: pneumatic ankle tourniquet @ 250mm Hg for 40 minutes  Estimated blood loss: 30mL  Materials:   1.2-0 nylon sutures, Iodoform packing   Implants:none   Injectables: Post operative block consisting of 20 ccs of 0.5% marcaine plain   Complications: None  Condition: Stable    Indications for Surgery:   Lavelle Ladd is a 63 y.o. male who was admitted to the hospital on for left foot infection complicated with gangrene of multiple digits. Pt was placed on IV antibiosis and obtained diagnostic imaging. Potential risks and complications were discussed with the patient in detail. The patient voiced understanding of such. The patient elected to undergo surgical treatment to aid in treatment of infection and accepts risks and possible complications. Preoperative history and physical were reviewed. There were no obvious contraindications noted to the surgical procedure. All the patient's questions were answered. Absolutely no guarantees were given or implied.     The patient was brought to operating room and placed on table in supine position.  A time out was performed to confirm correct patient identification and surgical procedure. General anesthesia was performed by the CRNA/Anesthesiologist. A well padded ankle tourniquet was applied to the operative extremity.  The operative foot was then scrubbed, prepped and draped in usual sterile manner.       Transmetatarsal Amputation, Left Foot   Attention was directed to the distal aspect of the operative lower  extremity. The necrotic digits were noted- left hallux, fourth, and fifth digits. The left second digit was noted to be cyanotic. A fishmouth incision was placed over the distal aspect of the foot encompassing all digits. The incision was deepened down through the subcutaneous tissues with sharp dissection to the deep fascial layer. There was adequate bleeding noted and hemostasis was obtained with electrocautery. The soft tissue was freed from the metatarsal bones. There was purulent pocket extending from second metatarsal and third interspace. The area was milked until no further purulent drainage was noted. At this point, the tourniquet was inflated to 250 mmhg.  The power sagittal saw was utilized to remove the distal forefoot at the distal metatarsal level. Care was taken to leave a good parabola shape of distal bones with no rohit prominences. The first metatarsal was angulated dorsal distal to plantar proximal slanted slightly proximally from medially to laterally. The lesser metatarsals were cut in a distal to proximal fashion with care to resect more bone laterally.  The second metatarsal bone was noted to be necrotic at the distal aspect. Additional bone was removed from the first and second metatarsal with clean margins marked for further pathological analysis. The forefoot was then disarticulated from the plantar flap with care to preserve maximum soft tissue coverage in the plantar flap. The specimens were passed from the operative field and sent to pathology. Any remaining non viable tissue and tendon was removed. The wound was then copiously irrigated with 3 L of pulse lavage with normal Saline. The remaining tissue was evaluated and no further necrosis was noted. The tourniquet was deflated at this time and hyperemia was noted to amputation stump.  Hemostasis was achieved with electrocauterization and hand ties as necessary. The wound was then packed open with iodoform gauze. Retention sutures were  placed on the skin flaps with 2-0 nylon sutures.     A post operative block was injected into the surgical site in an ankle block fashion consisting of 20 ccs of 0.5% marcaine plain. The incision site was dressed with adaptic, gauze, and a dry sterile dressing.The patient tolerated procedure and anesthesia well and was transferred to recovery room with vital signs stable and vascular status to pre operative level. The patient will be transferred back to the medical surgical floor in which bedside irrigations will ensue twice daily. This is a staged procedure in which final wound closure will be performed in 48-72 hours pending clinical improvement.     Report Electronically Signed By:     Karla Wheeler DPM   Podiatry  [facility]  9/21/2018

## 2018-09-21 NOTE — PROGRESS NOTES
[facility] -   Nephrology  Progress Note    Patient Name: Lavelle Ladd  MRN: [id number]  Admission Date: 9/18/2018  Hospital Length of Stay: 3 days  Attending Provider: Stephanie uMeller MD   Primary Care Physician: Rishabh Esteban MD  Principal Problem:Gas gangrene of foot    Subjective:     HPI: Patient is a 63-year-old male with type 1 diabetes with multiple complications including peripheral neuropathy and peripheral arterial disease.  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  Patient has been struggling with left foot wound and peripheral arterial disease along with osteomyelitis and gangrene of the left foot.  Patient is admitted with severe infection of the left foot with the possibility of needing surgical intervention.  Patient's baseline creatinine has been ranging between 1.6 and 1.9.  Patient's creatinine on admission is 1.9 and today is 1.8.  Patient is off and on confused and in bed social situation at home he was found to have feces all over himself.  Patient has an elderly mother at home as well with multiple social issues which are being addressed at this time.  Patient seen and examined in the hospital room bedside for renal transplant issues.  Patient has been on Prograf and prednisone.  No CellCept has been given to her at this time.    Interval History:     9/21/18: s/p left TMA today. Patient reports fatigue and mild abdominal pain (he had fall and fell on his abdomen, work-up was negative).         Review of patient's allergies indicates:  No Known Allergies  Current Facility-Administered Medications   Medication Frequency    acetaminophen tablet 650 mg Q4H PRN    amLODIPine tablet 2.5 mg Daily    arformoterol nebulizer solution 15 mcg Q12H    aspirin EC tablet 81 mg Daily    budesonide nebulizer solution 0.5 mg Q12H    dextrose 50% injection 12.5 g PRN    dextrose 50% injection 25 g PRN    gabapentin capsule 100 mg TID    glucagon (human recombinant) injection 1 mg PRN     glucose chewable tablet 16 g PRN    glucose chewable tablet 24 g PRN    hydrALAZINE injection 10 mg Q8H PRN    HYDROcodone-acetaminophen  mg per tablet 1 tablet Q4H PRN    insulin aspart U-100 pen 0-5 Units QID (AC + HS) PRN    insulin detemir U-100 pen 10 Units QHS    nozaseptin (NOZIN) nasal  BID    olanzapine zydis disintegrating tablet 5 mg QHS    ondansetron injection 4 mg Q8H PRN    pantoprazole EC tablet 40 mg Daily    piperacillin-tazobactam 4.5 g in dextrose 5 % 100 mL IVPB (ready to mix system) Q8H    predniSONE tablet 5 mg Daily    sodium bicarbonate tablet 2,600 mg BID    sodium chloride 0.9% flush 5 mL PRN    tacrolimus capsule 1.5 mg BID    traZODone tablet 50 mg Nightly PRN    vancomycin (VANCOCIN) 1,250 mg in dextrose 5 % 250 mL IVPB Q18H       Objective:     Vital Signs (Most Recent):  Temp: 98.6 °F (37 °C) (09/21/18 1015)  Pulse: 80 (09/21/18 1015)  Resp: 19 (09/21/18 1015)  BP: (!) 154/97 (09/21/18 1015)  SpO2: (!) 90 % (09/21/18 1015)  O2 Device (Oxygen Therapy): nasal cannula (09/21/18 1015) Vital Signs (24h Range):  Temp:  [96.8 °F (36 °C)-98.6 °F (37 °C)] 98.6 °F (37 °C)  Pulse:  [70-85] 80  Resp:  [18-20] 19  SpO2:  [90 %-95 %] 90 %  BP: (113-185)/(65-97) 154/97     Weight: 93 kg (205 lb 0.4 oz) (09/21/18 1015)  Body mass index is 28.6 kg/m².  Body surface area is 2.16 meters squared.    I/O last 3 completed shifts:  In: -   Out: 2400 [Urine:2400]    Physical Exam   Constitutional: He is oriented to person, place, and time. He appears well-developed. He is cooperative.   HENT:   Head: Normocephalic.   Nose: No rhinorrhea.   Mouth/Throat: Mucous membranes are normal. No oropharyngeal exudate.   Eyes: Pupils are equal, round, and reactive to light.   Neck: No thyroid mass present.   Cardiovascular: Normal rate, regular rhythm, S1 normal, S2 normal and intact distal pulses.   Pulmonary/Chest: Effort normal. No respiratory distress. He has no wheezes.   Abdominal:  Soft. Bowel sounds are normal. He exhibits no distension. There is no tenderness. No hernia.   Musculoskeletal: Tenderness: lastpt.   S/p left TMA.    S/p right BKA.   Lymphadenopathy:     He has no cervical adenopathy.   Neurological: He is alert and oriented to person, place, and time.   Skin: Skin is warm and dry.   Psychiatric: He has a normal mood and affect.       Significant Labs:  Lab Results   Component Value Date    CREATININE 1.6 (H) 09/21/2018    BUN 26 (H) 09/21/2018     (L) 09/21/2018    K 3.7 09/21/2018    CL 96 09/21/2018    CO2 25 09/21/2018     Lab Results   Component Value Date    .0 (H) 10/30/2017    CALCIUM 9.0 09/21/2018    CAION 1.10 05/30/2016    PHOS 2.1 (L) 09/18/2018     Lab Results   Component Value Date    ALBUMIN 2.5 (L) 09/18/2018     Lab Results   Component Value Date    WBC 11.73 09/21/2018    HGB 12.4 (L) 09/21/2018    HCT 37.8 (L) 09/21/2018    MCV 88 09/21/2018     09/21/2018     Recent Labs   Lab  09/18/18   1446   MG  1.9     Tacrolimus: 5.3 (9/19/18)      Significant Imaging:  Imaging Results          X-Ray Foot Complete Left (Final result)  Result time 09/18/18 19:50:43    Final result by Nicolas Sawyer MD (09/18/18 19:50:43)                 Impression:      1.  Progressive erosive changes with bone loss involving the 1st distal phalanx concerning for osteomyelitis.    2.  Air bubbles within the left 5th toe, concerning for possible infectious process or gangrene.  Clinical correlation is advised.    3.  Progressive soft tissue loss involving the 4th toe, which could also indicate gangrene.  Clinical correlation is advised.    4.  Persistent healing fracture of the 2nd proximal phalanx.  Other stable findings as noted above.      Electronically signed by: Nicolas Sawyer MD  Date:    09/18/2018  Time:    19:50             Narrative:    EXAMINATION:  XR FOOT COMPLETE 3 VIEW LEFT    CLINICAL HISTORY:  Pain in left foot.  Gangrene, not elsewhere  classified    TECHNIQUE:  AP, lateral and oblique views of the left foot were performed.    COMPARISON:  August 16, 2018    FINDINGS:  There is been interval bone loss involving the distal 1st phalanx, with overlying soft tissue defect.  Healing fracture of the 2nd proximal phalanx again seen.  There has been progressive soft tissue loss overlying the 4th toe and there are air bubbles in the 5th toe soft tissues.  No other bone loss is seen.    Plantar and Achilles calcaneal spurs.  Osteopenia.  Vascular calcifications.  Accessory ossicle adjacent to the navicular bone.                               CT Renal Stone Study ABD Pelvis WO (Final result)  Result time 09/18/18 18:31:38    Final result by Nicolas Sawyer MD (09/18/18 18:31:38)                 Impression:      1.  Negative for acute process involving the abdomen or pelvis.  Negative for inflammatory changes.  Normal appendix.  Negative for renal stone disease or hydronephrosis, in this patient who has atrophic native kidneys and a right lower quadrant transplant kidney.    2. Again seen is mild prominence of the transplant kidney pelvocaliceal system, without dilation of the ureter, likely a chronic finding.    3.  Mild distention of the gallbladder, which is otherwise normal, nonspecific finding.    4.  Fluid within the nondilated colon, which can be seen in patients with diarrhea or gastroenteritis.  Clinical correlation is advised.    5.  Dependent atelectasis in the lung bases.    6.  Moderate sliding-type hiatal hernia with possible distal esophageal thickening.  Does the patient have symptoms of esophagitis?    7.  Stable findings as noted above.    All CT scans at this facility are performed  using dose modulation techniques as appropriate to performed exam including the following:  automated exposure control; adjustment of mA and/or kV according to the patients size (this includes techniques or standardized protocols for targeted exams where dose is  matched to indication/reason for exam: i.e. extremities or head);  iterative reconstruction technique.      Electronically signed by: Nicolas Sawyer MD  Date:    09/18/2018  Time:    18:31             Narrative:    EXAMINATION:  CT RENAL STONE STUDY ABD PELVIS WO    CLINICAL HISTORY:  Flank pain, stone disease suspected;    TECHNIQUE:  Axial images through the abdomen and pelvis were obtained without the use of IV contrast.  Sagittal and coronal reconstructions are provided for review.  Oral contrast was not utilized.    COMPARISON:  December 11, 2016    FINDINGS:  LUNG BASES: Respiratory motion artifact is present on a number of images.  Subtle abnormalities could be overlooked.  Stable scarring or atelectasis in the inferior lingula and middle lobe.  Lung bases are otherwise clear.  Negative for pleural effusions.  There is a small amount of pericardial fluid.  Coronary artery calcifications.  Moderate size hiatal hernia with thickening of the distal esophagus.    ABDOMEN: The gallbladder is mildly distended.  No surrounding inflammatory changes or wall thickening is seen.  Bilateral perinephric stranding densities noted, with mild atrophy of both kidneys again seen.  Vascular calcifications within the spleen.  Again seen is mild prominence of the biliary tree measuring up to 8 mm.  The liver, spleen and gallbladder otherwise appear normal.  The pancreas is unremarkable.  Kidneys and adrenal glands are otherwise normal.  Negative for hydronephrosis or renal stone disease.  Significant vascular calcifications are present within the renal pelves, with bilateral renal sinus lipomatosis again seen.    There remains to be a transplant kidney in the right anterior hemipelvis.  Mild prominence of the renal collecting system is again seen.  Negative for perinephric stranding densities.  Negative for definite renal stones.    Negative for adenopathy, free fluid or inflammatory change noted within the abdomen or pelvis.   Vascular calcifications are present without aneurysmal changes, with the aorta measuring a maximum of 2.9 cm.    The small bowel appears normal. Normal appendix.  There is fluid within the proximal and mid colon, without dilation or surrounding inflammatory changes.    PELVIS: The urinary bladder is mildly thick wall, a stable finding.  Prostate calcifications.  The   pelvic organs are breast of e-mail.  There are pelvic phleboliths.    No significant osseous abnormality is identified.  Negative for significant spinal canal stenosis. Similar degree of degenerative disc changes throughout the thoracic and lumbar spine, most significantly involving L3/L4.    Negative for groin adenopathy.    Postoperative changes involve the right pelvic abdominal wall from the transplant kidney placement.  The abdominal wall is intact.                               X-Ray Chest 1 View (Final result)  Result time 09/18/18 17:03:13    Final result by ANA CRISTINA Joseph Sr., MD (09/18/18 17:03:13)                 Impression:      1. There is a mild amount of haziness in the lateral aspect of the base of the left hemithorax.  This is characteristic of atelectasis or subtle pneumonia.  2. The left costophrenic angle is blunted.  This is characteristic of a tiny pleural effusion.  3. There is a mild amount of levoconvex curvature of the thoracic spine.  4. There is mild cardiomegaly.  .      Electronically signed by: Porter Joseph MD  Date:    09/18/2018  Time:    17:03             Narrative:    EXAMINATION:  XR CHEST 1 VIEW    CLINICAL HISTORY:  Adult failure to thrive    COMPARISON:  07/20/2018    FINDINGS:  The size of the heart is prominent.  There is a mild amount of haziness in the lateral aspect of the base of the left hemithorax.  The right lung is clear.  There is no pneumothorax.  The left costophrenic angle is blunted.  The right costophrenic angle is sharp.  There is a mild amount of levoconvex curvature of the thoracic spine.                                   Assessment/Plan:     Gangrene of left foot    S/p left TMA on 18. Podiatry is following.         Osteomyelitis of left foot    Continue antibiotics.         Chronic kidney disease (CKD), stage III (moderate)    Patient with CKD 3. Creatinine has remained at his baseline.          donor kidney transplant for DM 16    Patient's renal function stable with creatinine at 1.6 (his baseline).     Continue immunosuppression with Prograf, prednisone.     Will monitor closely with repeat renal panel.    Tacrolimus at goal.             Thank you for your consult. I will follow-up with patient. Please contact us if you have any additional questions.    Bud Tam MD  Nephrology  [facility]

## 2018-09-21 NOTE — ANESTHESIA PREPROCEDURE EVALUATION
09/21/2018  Lavelle Ladd is a 63 y.o., male.    Pre-op Assessment    I have reviewed the Patient Summary Reports.     I have reviewed the Nursing Notes.   I have reviewed the Medications.     Review of Systems  Anesthesia Hx:  No problems with previous Anesthesia  Denies Family Hx of Anesthesia complications.   Denies Personal Hx of Anesthesia complications.   Social:  Former Smoker    Cardiovascular:   Hypertension CAD   ECG has been reviewed. Diastolic dysfunction   Pulmonary:   COPD    Renal/:   Chronic Renal Disease Kidney transplant.  On prednisone    Hepatic/GI:   GERD Liver Disease, Hepatitis    Musculoskeletal:   Arthritis     Neurological:   Neuromuscular Disease,    Endocrine:   Diabetes, type 2        Physical Exam  General:  Well nourished    Airway/Jaw/Neck:  Airway Findings: Mouth Opening: Normal Mallampati: II      Dental:  Poor dentition   Chest/Lungs:  Chest/Lungs Findings: Normal Respiratory Rate, Clear to auscultation     Heart/Vascular:  Heart Findings: Rate: Normal  Rhythm: Regular Rhythm  Sounds: Normal             Anesthesia Plan  Type of Anesthesia, risks & benefits discussed:  Anesthesia Type:  general  Patient's Preference:   Intra-op Monitoring Plan:   Intra-op Monitoring Plan Comments:   Post Op Pain Control Plan: multimodal analgesia  Post Op Pain Control Plan Comments:   Induction:   IV  Beta Blocker:  Patient is not currently on a Beta-Blocker (No further documentation required).       Informed Consent: Patient understands risks and agrees with Anesthesia plan.  Questions answered. Anesthesia consent signed with patient.  ASA Score: 4     Day of Surgery Review of History & Physical: I have interviewed and examined the patient. I have reviewed the patient's H&P dated:  There are no significant changes.      Anesthesia Plan Notes: Gangrene of L foot.

## 2018-09-21 NOTE — PLAN OF CARE
Problem: Patient Care Overview  Goal: Plan of Care Review  Outcome: Ongoing (interventions implemented as appropriate)  Pt free of falls during shift. VSS. Peripheral IV intact. No Fluids infusing. Tele ordered pt on the monitor. Pt on RA no shortness of breath noted. Pain controlled as much as possible with ordered meds. Call light in reach. Hourly rounds done. Family present at bedside. Will continue to monitor

## 2018-09-21 NOTE — PLAN OF CARE
Problem: Patient Care Overview  Goal: Plan of Care Review  Outcome: Ongoing (interventions implemented as appropriate)  Recommendations   Recommendation/Intervention 1. ADAT to cardiac, diabetic after procedure 2. RD to monitor    Goals Meal intake of >50% of meals after NPO    Nutrition Goal Status new   Communication of RD Recs (POC, sticky note)

## 2018-09-21 NOTE — ASSESSMENT & PLAN NOTE
Gunjan/zosyn  poditary consult   --TMA schedueled 9/21 9/22  Plan for wound closure on Monday  Podiatry  Plan for post acute care  Patient declining SNF placement  CM on consult

## 2018-09-21 NOTE — ASSESSMENT & PLAN NOTE
Not severe sepsis. Patients kidney function baseline with kidney transplant and troponin elevation related to CKD.   Continue IV antx therapy with vanc and zosyn for suspected source of foot wound  --BC grew STAPHYLOCOCCUS AUREUS, sensitivities pending   --continue IV Vancomycin  --ID consulted  --repeat bc drawn 9/21  Monitor labs

## 2018-09-21 NOTE — TRANSFER OF CARE
"Anesthesia Transfer of Care Note    Patient: Lavelle Ladd    Procedure(s) Performed: Procedure(s) (LRB):  AMPUTATION, FOOT, TRANSMETATARSAL (Left)    Patient location: PACU    Anesthesia Type: general    Transport from OR: Transported from OR on room air with adequate spontaneous ventilation    Post pain: adequate analgesia    Post assessment: no apparent anesthetic complications and tolerated procedure well    Post vital signs: stable    Level of consciousness: awake    Nausea/Vomiting: no nausea/vomiting    Complications: none    Transfer of care protocol was followed      Last vitals:   Visit Vitals  BP (!) 154/97 (BP Location: Right arm, Patient Position: Lying)   Pulse 80   Temp 37 °C (98.6 °F) (Tympanic)   Resp 19   Ht 5' 11" (1.803 m)   Wt 93 kg (205 lb 0.4 oz)   SpO2 (!) 90%   BMI 28.60 kg/m²     "

## 2018-09-21 NOTE — PT/OT/SLP PROGRESS
Physical Therapy      Patient Name:  Lavelle Ladd   MRN:  [id number]    Patient not seen today secondary to at sx now for toe amp.  . Will follow-up when appropriate.  Time: 11:40a.jessica Alvarez PTA

## 2018-09-21 NOTE — PLAN OF CARE
Problem: Patient Care Overview  Goal: Plan of Care Review  Outcome: Ongoing (interventions implemented as appropriate)  Plan of care discussed with patient, and patient verbalized understanding.  Patient remained AOx4, 2L NC, and NSR on the monitor.  Pain medication adjusted.  Patient will be NPO after midnight for a procedure 9/20.  Patient remained free of falls, accidents, and trama during the day shift.  Bed is in the lowest position and call light is within reach - Continue to monitor.

## 2018-09-21 NOTE — SUBJECTIVE & OBJECTIVE
Interval History: TMA today. BC+ - will need 6 weeks IV abx. ID consulted.     Review of Systems   Constitutional: Negative for chills, diaphoresis, fatigue and fever.   HENT: Negative for congestion, postnasal drip, rhinorrhea and sore throat.    Eyes: Negative for pain and visual disturbance.   Respiratory: Negative for cough, shortness of breath and wheezing.    Cardiovascular: Negative for chest pain, palpitations and leg swelling.   Gastrointestinal: Negative for abdominal distention, abdominal pain, constipation, diarrhea, nausea and vomiting.   Endocrine: Negative for cold intolerance and heat intolerance.   Genitourinary: Negative for difficulty urinating, dysuria and hematuria.   Musculoskeletal: Positive for back pain.   Skin: Positive for wound.   Allergic/Immunologic: Positive for immunocompromised state.   Neurological: Negative for dizziness, syncope, speech difficulty, weakness, light-headedness and headaches.   Hematological: Negative.    Psychiatric/Behavioral: Negative for agitation, behavioral problems and confusion.     Objective:     Vital Signs (Most Recent):  Temp: 98.8 °F (37.1 °C) (09/21/18 1327)  Pulse: 79 (09/21/18 1351)  Resp: 12 (09/21/18 1351)  BP: 131/75 (09/21/18 1351)  SpO2: (!) 94 % (09/21/18 1351) Vital Signs (24h Range):  Temp:  [97.4 °F (36.3 °C)-98.8 °F (37.1 °C)] 98.8 °F (37.1 °C)  Pulse:  [70-85] 79  Resp:  [10-26] 12  SpO2:  [90 %-100 %] 94 %  BP: (103-185)/(65-97) 131/75     Weight: 93 kg (205 lb 0.4 oz)  Body mass index is 28.6 kg/m².    Intake/Output Summary (Last 24 hours) at 9/21/2018 1401  Last data filed at 9/21/2018 1323  Gross per 24 hour   Intake 350 ml   Output 500 ml   Net -150 ml      Physical Exam   Constitutional: He is oriented to person, place, and time. He appears well-developed. He has a sickly appearance. He appears ill. No distress.   Elderly, unkept   HENT:   Head: Normocephalic and atraumatic.   Nose: Nose normal.   Mouth/Throat: Oropharynx is clear and  moist.   Eyes: Conjunctivae and EOM are normal. Pupils are equal, round, and reactive to light. No scleral icterus.   Neck: Normal range of motion. Neck supple. No JVD present. No tracheal deviation present.   Cardiovascular: Normal rate, regular rhythm, normal heart sounds and intact distal pulses.   No murmur heard.  Pulmonary/Chest: Effort normal and breath sounds normal. No respiratory distress. He has no wheezes.   Abdominal: Soft. Bowel sounds are normal. He exhibits no distension. There is no tenderness.   obese   Genitourinary:   Genitourinary Comments: Urinating independently   Musculoskeletal: Normal range of motion. He exhibits edema (1+pitting to left foot.). He exhibits no deformity.   Amputation to right lower   Left foot wounds   Neurological: He is alert and oriented to person, place, and time. No cranial nerve deficit.        Skin: Skin is warm and dry. Capillary refill takes 2 to 3 seconds. He is not diaphoretic.   Unkept, dusky 2nd toe, necrotic wound to 4th and 5th toes.     Psychiatric: He has a normal mood and affect. His behavior is normal. Judgment and thought content normal.   Nursing note and vitals reviewed.      Significant Labs:   Recent Lab Results       09/21/18  1351 09/21/18  1011 09/21/18  0535 09/20/18  2107 09/20/18  1730      Anion Gap   11       Baso #   0.01       Basophil%   0.1       BUN, Bld   26       Calcium   9.0       Chloride   96       CO2   25       Creatinine   1.6       Differential Method   Automated       eGFR if    51       eGFR if non    45  Comment:  Calculation used to obtain the estimated glomerular filtration  rate (eGFR) is the CKD-EPI equation.          Eos #   0.0       Eosinophil%   0.2       Glucose   274       Gran # (ANC)   9.7       Gran%   82.7       Hematocrit   37.8       Hemoglobin   12.4       Lymph #   1.0       Lymph%   8.9       MCH   28.9       MCHC   32.8       MCV   88       Mono #   1.0       Mono%   8.1        MPV   10.0       Platelets   255       POCT Glucose 223 264  300 309     Potassium   3.7       RBC   4.29       RDW   13.6       Sodium   132       WBC   11.73                     All pertinent labs within the past 24 hours have been reviewed.    Significant Imaging: I have reviewed all pertinent imaging results/findings within the past 24 hours.

## 2018-09-21 NOTE — ASSESSMENT & PLAN NOTE
Hold bp meds at this time due to sepsis   Monitor trends and restart once need    -sepsis status improving.  -Restart BP meds.  -Monitor VS    9/22  [facility]  Monitor

## 2018-09-21 NOTE — ASSESSMENT & PLAN NOTE
--BC grew STAPHYLOCOCCUS AUREUS, sensitivities pending   --continue IV Vancomycin  --ID consulted  --repeat bc drawn 9/21 9/22  ABX  ID  Monitor  PICC  Home IV abx infusion

## 2018-09-22 ENCOUNTER — ANESTHESIA EVENT (OUTPATIENT)
Dept: SURGERY | Facility: HOSPITAL | Age: 63
DRG: 853 | End: 2018-09-22
Payer: MEDICARE

## 2018-09-22 LAB
ANION GAP SERPL CALC-SCNC: 14 MMOL/L
BASOPHILS # BLD AUTO: 0.01 K/UL
BASOPHILS NFR BLD: 0.1 %
BUN SERPL-MCNC: 28 MG/DL
CALCIUM SERPL-MCNC: 9 MG/DL
CHLORIDE SERPL-SCNC: 96 MMOL/L
CO2 SERPL-SCNC: 22 MMOL/L
CREAT SERPL-MCNC: 1.8 MG/DL
DIFFERENTIAL METHOD: ABNORMAL
EOSINOPHIL # BLD AUTO: 0 K/UL
EOSINOPHIL NFR BLD: 0 %
ERYTHROCYTE [DISTWIDTH] IN BLOOD BY AUTOMATED COUNT: 13.6 %
EST. GFR  (AFRICAN AMERICAN): 45 ML/MIN/1.73 M^2
EST. GFR  (NON AFRICAN AMERICAN): 39 ML/MIN/1.73 M^2
GLUCOSE SERPL-MCNC: 382 MG/DL
GRAM STN SPEC: NORMAL
GRAM STN SPEC: NORMAL
HCT VFR BLD AUTO: 38 %
HGB BLD-MCNC: 12.3 G/DL
LYMPHOCYTES # BLD AUTO: 1.4 K/UL
LYMPHOCYTES NFR BLD: 11.2 %
MCH RBC QN AUTO: 28.5 PG
MCHC RBC AUTO-ENTMCNC: 32.4 G/DL
MCV RBC AUTO: 88 FL
MONOCYTES # BLD AUTO: 0.9 K/UL
MONOCYTES NFR BLD: 7.2 %
NEUTROPHILS # BLD AUTO: 10.1 K/UL
NEUTROPHILS NFR BLD: 81.5 %
PHOSPHATE SERPL-MCNC: 2.1 MG/DL
PLATELET # BLD AUTO: 283 K/UL
PMV BLD AUTO: 10.4 FL
POCT GLUCOSE: 260 MG/DL (ref 70–110)
POCT GLUCOSE: 294 MG/DL (ref 70–110)
POCT GLUCOSE: 298 MG/DL (ref 70–110)
POCT GLUCOSE: 370 MG/DL (ref 70–110)
POTASSIUM SERPL-SCNC: 4.1 MMOL/L
RBC # BLD AUTO: 4.31 M/UL
SODIUM SERPL-SCNC: 132 MMOL/L
WBC # BLD AUTO: 12.39 K/UL

## 2018-09-22 PROCEDURE — 85025 COMPLETE CBC W/AUTO DIFF WBC: CPT

## 2018-09-22 PROCEDURE — 36415 COLL VENOUS BLD VENIPUNCTURE: CPT

## 2018-09-22 PROCEDURE — 99900035 HC TECH TIME PER 15 MIN (STAT)

## 2018-09-22 PROCEDURE — 63600175 PHARM REV CODE 636 W HCPCS: Performed by: NURSE PRACTITIONER

## 2018-09-22 PROCEDURE — 63600175 PHARM REV CODE 636 W HCPCS: Performed by: HOSPITALIST

## 2018-09-22 PROCEDURE — 25000003 PHARM REV CODE 250: Performed by: INTERNAL MEDICINE

## 2018-09-22 PROCEDURE — 63600175 PHARM REV CODE 636 W HCPCS: Performed by: INTERNAL MEDICINE

## 2018-09-22 PROCEDURE — 94761 N-INVAS EAR/PLS OXIMETRY MLT: CPT

## 2018-09-22 PROCEDURE — 25000003 PHARM REV CODE 250: Performed by: NURSE PRACTITIONER

## 2018-09-22 PROCEDURE — 25000242 PHARM REV CODE 250 ALT 637 W/ HCPCS: Performed by: NURSE PRACTITIONER

## 2018-09-22 PROCEDURE — 94640 AIRWAY INHALATION TREATMENT: CPT

## 2018-09-22 PROCEDURE — 21400001 HC TELEMETRY ROOM

## 2018-09-22 PROCEDURE — 84100 ASSAY OF PHOSPHORUS: CPT

## 2018-09-22 PROCEDURE — 80048 BASIC METABOLIC PNL TOTAL CA: CPT

## 2018-09-22 PROCEDURE — 99233 SBSQ HOSP IP/OBS HIGH 50: CPT | Mod: ,,, | Performed by: INTERNAL MEDICINE

## 2018-09-22 RX ORDER — SODIUM CHLORIDE 0.9 % (FLUSH) 0.9 %
3 SYRINGE (ML) INJECTION
Status: CANCELLED | OUTPATIENT
Start: 2018-09-22

## 2018-09-22 RX ORDER — METOPROLOL TARTRATE 25 MG/1
25 TABLET, FILM COATED ORAL 2 TIMES DAILY
Status: DISCONTINUED | OUTPATIENT
Start: 2018-09-22 | End: 2018-09-26 | Stop reason: HOSPADM

## 2018-09-22 RX ORDER — MEPERIDINE HYDROCHLORIDE 50 MG/ML
12.5 INJECTION INTRAMUSCULAR; INTRAVENOUS; SUBCUTANEOUS ONCE AS NEEDED
Status: CANCELLED | OUTPATIENT
Start: 2018-09-22 | End: 2018-09-22

## 2018-09-22 RX ORDER — SODIUM CHLORIDE, SODIUM LACTATE, POTASSIUM CHLORIDE, CALCIUM CHLORIDE 600; 310; 30; 20 MG/100ML; MG/100ML; MG/100ML; MG/100ML
INJECTION, SOLUTION INTRAVENOUS CONTINUOUS
Status: CANCELLED | OUTPATIENT
Start: 2018-09-22

## 2018-09-22 RX ORDER — MORPHINE SULFATE 4 MG/ML
2 INJECTION, SOLUTION INTRAMUSCULAR; INTRAVENOUS EVERY 5 MIN PRN
Status: CANCELLED | OUTPATIENT
Start: 2018-09-22

## 2018-09-22 RX ORDER — MORPHINE SULFATE 4 MG/ML
3 INJECTION, SOLUTION INTRAMUSCULAR; INTRAVENOUS EVERY 4 HOURS PRN
Status: DISCONTINUED | OUTPATIENT
Start: 2018-09-22 | End: 2018-09-26 | Stop reason: HOSPADM

## 2018-09-22 RX ORDER — MORPHINE SULFATE 2 MG/ML
2 INJECTION, SOLUTION INTRAMUSCULAR; INTRAVENOUS ONCE
Status: COMPLETED | OUTPATIENT
Start: 2018-09-22 | End: 2018-09-22

## 2018-09-22 RX ORDER — METOCLOPRAMIDE HYDROCHLORIDE 5 MG/ML
10 INJECTION INTRAMUSCULAR; INTRAVENOUS EVERY 10 MIN PRN
Status: CANCELLED | OUTPATIENT
Start: 2018-09-22

## 2018-09-22 RX ORDER — AMLODIPINE BESYLATE 5 MG/1
5 TABLET ORAL ONCE
Status: COMPLETED | OUTPATIENT
Start: 2018-09-22 | End: 2018-09-22

## 2018-09-22 RX ORDER — OXYCODONE HYDROCHLORIDE 5 MG/1
5 TABLET ORAL
Status: CANCELLED | OUTPATIENT
Start: 2018-09-22

## 2018-09-22 RX ORDER — HYDRALAZINE HYDROCHLORIDE 20 MG/ML
10 INJECTION INTRAMUSCULAR; INTRAVENOUS EVERY 6 HOURS PRN
Status: DISCONTINUED | OUTPATIENT
Start: 2018-09-22 | End: 2018-09-26 | Stop reason: HOSPADM

## 2018-09-22 RX ORDER — SODIUM CHLORIDE 0.9 % (FLUSH) 0.9 %
3 SYRINGE (ML) INJECTION EVERY 8 HOURS
Status: CANCELLED | OUTPATIENT
Start: 2018-09-22

## 2018-09-22 RX ORDER — AMLODIPINE BESYLATE 5 MG/1
5 TABLET ORAL DAILY
Status: DISCONTINUED | OUTPATIENT
Start: 2018-09-23 | End: 2018-09-26 | Stop reason: HOSPADM

## 2018-09-22 RX ORDER — MORPHINE SULFATE 2 MG/ML
2 INJECTION, SOLUTION INTRAMUSCULAR; INTRAVENOUS EVERY 6 HOURS PRN
Status: DISCONTINUED | OUTPATIENT
Start: 2018-09-22 | End: 2018-09-22

## 2018-09-22 RX ADMIN — PANTOPRAZOLE SODIUM 40 MG: 40 TABLET, DELAYED RELEASE ORAL at 09:09

## 2018-09-22 RX ADMIN — HYDROCODONE BITARTRATE AND ACETAMINOPHEN 1 TABLET: 10; 325 TABLET ORAL at 05:09

## 2018-09-22 RX ADMIN — INSULIN ASPART 1 UNITS: 100 INJECTION, SOLUTION INTRAVENOUS; SUBCUTANEOUS at 11:09

## 2018-09-22 RX ADMIN — BUDESONIDE 0.5 MG: 0.5 SUSPENSION RESPIRATORY (INHALATION) at 07:09

## 2018-09-22 RX ADMIN — GABAPENTIN 100 MG: 100 CAPSULE ORAL at 09:09

## 2018-09-22 RX ADMIN — INSULIN DETEMIR 25 UNITS: 100 INJECTION, SOLUTION SUBCUTANEOUS at 09:09

## 2018-09-22 RX ADMIN — PREDNISONE 5 MG: 5 TABLET ORAL at 09:09

## 2018-09-22 RX ADMIN — SODIUM BICARBONATE 650 MG TABLET 2600 MG: at 09:09

## 2018-09-22 RX ADMIN — ARFORMOTEROL TARTRATE 15 MCG: 15 SOLUTION RESPIRATORY (INHALATION) at 07:09

## 2018-09-22 RX ADMIN — HYDRALAZINE HYDROCHLORIDE 10 MG: 20 INJECTION INTRAMUSCULAR; INTRAVENOUS at 04:09

## 2018-09-22 RX ADMIN — HYDROCODONE BITARTRATE AND ACETAMINOPHEN 1 TABLET: 10; 325 TABLET ORAL at 01:09

## 2018-09-22 RX ADMIN — GABAPENTIN 100 MG: 100 CAPSULE ORAL at 02:09

## 2018-09-22 RX ADMIN — METOPROLOL TARTRATE 25 MG: 25 TABLET ORAL at 06:09

## 2018-09-22 RX ADMIN — OLANZAPINE 5 MG: 5 TABLET, ORALLY DISINTEGRATING ORAL at 09:09

## 2018-09-22 RX ADMIN — AMLODIPINE BESYLATE 2.5 MG: 2.5 TABLET ORAL at 09:09

## 2018-09-22 RX ADMIN — AMLODIPINE BESYLATE 5 MG: 5 TABLET ORAL at 10:09

## 2018-09-22 RX ADMIN — HYDROCODONE BITARTRATE AND ACETAMINOPHEN 1 TABLET: 10; 325 TABLET ORAL at 09:09

## 2018-09-22 RX ADMIN — PIPERACILLIN SODIUM AND TAZOBACTAM SODIUM 4.5 G: 4; .5 INJECTION, POWDER, LYOPHILIZED, FOR SOLUTION INTRAVENOUS at 05:09

## 2018-09-22 RX ADMIN — ASPIRIN 81 MG: 81 TABLET, COATED ORAL at 09:09

## 2018-09-22 RX ADMIN — INSULIN ASPART 3 UNITS: 100 INJECTION, SOLUTION INTRAVENOUS; SUBCUTANEOUS at 05:09

## 2018-09-22 RX ADMIN — INSULIN ASPART 3 UNITS: 100 INJECTION, SOLUTION INTRAVENOUS; SUBCUTANEOUS at 11:09

## 2018-09-22 RX ADMIN — TACROLIMUS 1.5 MG: 1 CAPSULE ORAL at 09:09

## 2018-09-22 RX ADMIN — HYDROCODONE BITARTRATE AND ACETAMINOPHEN 1 TABLET: 10; 325 TABLET ORAL at 02:09

## 2018-09-22 RX ADMIN — Medication 2 MG: at 05:09

## 2018-09-22 RX ADMIN — TACROLIMUS 1.5 MG: 1 CAPSULE ORAL at 05:09

## 2018-09-22 RX ADMIN — INSULIN ASPART 5 UNITS: 100 INJECTION, SOLUTION INTRAVENOUS; SUBCUTANEOUS at 06:09

## 2018-09-22 RX ADMIN — PIPERACILLIN SODIUM AND TAZOBACTAM SODIUM 4.5 G: 4; .5 INJECTION, POWDER, LYOPHILIZED, FOR SOLUTION INTRAVENOUS at 09:09

## 2018-09-22 RX ADMIN — PIPERACILLIN SODIUM AND TAZOBACTAM SODIUM 4.5 G: 4; .5 INJECTION, POWDER, LYOPHILIZED, FOR SOLUTION INTRAVENOUS at 02:09

## 2018-09-22 RX ADMIN — MORPHINE SULFATE 3 MG: 4 INJECTION INTRAVENOUS at 09:09

## 2018-09-22 NOTE — ASSESSMENT & PLAN NOTE
Dr. Estrada with renal consulted due to patient with transplant.  He reports patient can stay here  Renal consulted   Monitor rfp    9/22  IVF's  Nephrology

## 2018-09-22 NOTE — ASSESSMENT & PLAN NOTE
S/P TMA of left foot .  Blood cultures are positive for MSSA.     Will continue Zosyn.Stop Vancomycin as there is no MRSA.

## 2018-09-22 NOTE — PROGRESS NOTES
[facility] -   Nephrology  Progress Note    Patient Name: Lavelle Ladd  MRN: [id number]  Admission Date: 9/18/2018  Hospital Length of Stay: 4 days  Attending Provider: Chapin Proctor MD   Primary Care Physician: Rishabh Esteban MD  Principal Problem:Gas gangrene of foot    Subjective:     HPI: Patient is a 63-year-old male with type 1 diabetes with multiple complications including peripheral neuropathy and peripheral arterial disease.  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  Patient has been struggling with left foot wound and peripheral arterial disease along with osteomyelitis and gangrene of the left foot.  Patient is admitted with severe infection of the left foot with the possibility of needing surgical intervention.  Patient's baseline creatinine has been ranging between 1.6 and 1.9.  Patient's creatinine on admission is 1.9 and today is 1.8.  Patient is off and on confused and in bed social situation at home he was found to have feces all over himself.  Patient has an elderly mother at home as well with multiple social issues which are being addressed at this time.  Patient seen and examined in the hospital room bedside for renal transplant issues.  Patient has been on Prograf and prednisone.  No CellCept has been given to her at this time.    Interval History:     9/21/18: s/p left TMA today. Patient reports fatigue and mild abdominal pain (he had fall and fell on his abdomen, work-up was negative).     9/22/18:Patient states that he has been drinking plenty of fluids. He reports intermittent back pain.             Review of patient's allergies indicates:  No Known Allergies  Current Facility-Administered Medications   Medication Frequency    acetaminophen tablet 650 mg Q4H PRN    amLODIPine tablet 2.5 mg Daily    arformoterol nebulizer solution 15 mcg Q12H    aspirin EC tablet 81 mg Daily    budesonide nebulizer solution 0.5 mg Q12H    dextrose 50% injection 12.5 g PRN    dextrose 50%  injection 25 g PRN    gabapentin capsule 100 mg TID    glucagon (human recombinant) injection 1 mg PRN    glucose chewable tablet 16 g PRN    glucose chewable tablet 24 g PRN    hydrALAZINE injection 10 mg Q8H PRN    HYDROcodone-acetaminophen  mg per tablet 1 tablet Q4H PRN    insulin aspart U-100 pen 0-5 Units QID (AC + HS) PRN    insulin detemir U-100 pen 20 Units QHS    nozaseptin (NOZIN) nasal  BID    olanzapine zydis disintegrating tablet 5 mg QHS    ondansetron injection 4 mg Q8H PRN    pantoprazole EC tablet 40 mg Daily    piperacillin-tazobactam 4.5 g in dextrose 5 % 100 mL IVPB (ready to mix system) Q8H    predniSONE tablet 5 mg Daily    sodium bicarbonate tablet 2,600 mg BID    sodium chloride 0.9% flush 5 mL PRN    tacrolimus capsule 1.5 mg BID    traZODone tablet 50 mg Nightly PRN       Objective:     Vital Signs (Most Recent):  Temp: 98.1 °F (36.7 °C) (09/22/18 0743)  Pulse: 71 (09/22/18 0930)  Resp: 18 (09/22/18 0743)  BP: (!) 145/82 (09/22/18 0743)  SpO2: 95 % (09/22/18 0743)  O2 Device (Oxygen Therapy): room air (09/22/18 0743) Vital Signs (24h Range):  Temp:  [96.2 °F (35.7 °C)-98.8 °F (37.1 °C)] 98.1 °F (36.7 °C)  Pulse:  [71-84] 71  Resp:  [10-26] 18  SpO2:  [90 %-100 %] 95 %  BP: (103-177)/(65-93) 145/82     Weight: 93 kg (205 lb 0.4 oz) (09/21/18 1100)  Body mass index is 28.6 kg/m².  Body surface area is 2.16 meters squared.    I/O last 3 completed shifts:  In: 350 [I.V.:350]  Out: 1850 [Urine:1850]    Physical Exam   Constitutional: He is oriented to person, place, and time. He appears well-developed. He is cooperative.   HENT:   Head: Normocephalic.   Nose: No rhinorrhea.   Mouth/Throat: Mucous membranes are normal. No oropharyngeal exudate.   Eyes: Pupils are equal, round, and reactive to light.   Neck: No thyroid mass present.   Cardiovascular: Normal rate, regular rhythm, S1 normal, S2 normal and intact distal pulses.   Pulmonary/Chest: Effort normal. No  respiratory distress. He has no wheezes.   Abdominal: Soft. Bowel sounds are normal. He exhibits no distension. There is no tenderness. No hernia.   Musculoskeletal: Tenderness: lastpt.   S/p left TMA.    S/p right BKA.   Lymphadenopathy:     He has no cervical adenopathy.   Neurological: He is alert and oriented to person, place, and time.   Skin: Skin is warm and dry.   Psychiatric: He has a normal mood and affect.       Significant Labs:  Lab Results   Component Value Date    CREATININE 1.8 (H) 09/22/2018    BUN 28 (H) 09/22/2018     (L) 09/22/2018    K 4.1 09/22/2018    CL 96 09/22/2018    CO2 22 (L) 09/22/2018     Lab Results   Component Value Date    .0 (H) 10/30/2017    CALCIUM 9.0 09/22/2018    CAION 1.10 05/30/2016    PHOS 2.1 (L) 09/18/2018     Lab Results   Component Value Date    ALBUMIN 2.5 (L) 09/18/2018     Lab Results   Component Value Date    WBC 12.39 09/22/2018    HGB 12.3 (L) 09/22/2018    HCT 38.0 (L) 09/22/2018    MCV 88 09/22/2018     09/22/2018     Recent Labs   Lab  09/18/18   1446   MG  1.9     Tacrolimus: 5.3 (9/19/18)      Significant Imaging:  Imaging Results          X-Ray Foot Complete Left (Final result)  Result time 09/18/18 19:50:43    Final result by Nicolas Sawyer MD (09/18/18 19:50:43)                 Impression:      1.  Progressive erosive changes with bone loss involving the 1st distal phalanx concerning for osteomyelitis.    2.  Air bubbles within the left 5th toe, concerning for possible infectious process or gangrene.  Clinical correlation is advised.    3.  Progressive soft tissue loss involving the 4th toe, which could also indicate gangrene.  Clinical correlation is advised.    4.  Persistent healing fracture of the 2nd proximal phalanx.  Other stable findings as noted above.      Electronically signed by: Nicolas Sawyer MD  Date:    09/18/2018  Time:    19:50             Narrative:    EXAMINATION:  XR FOOT COMPLETE 3 VIEW LEFT    CLINICAL  HISTORY:  Pain in left foot.  Gangrene, not elsewhere classified    TECHNIQUE:  AP, lateral and oblique views of the left foot were performed.    COMPARISON:  August 16, 2018    FINDINGS:  There is been interval bone loss involving the distal 1st phalanx, with overlying soft tissue defect.  Healing fracture of the 2nd proximal phalanx again seen.  There has been progressive soft tissue loss overlying the 4th toe and there are air bubbles in the 5th toe soft tissues.  No other bone loss is seen.    Plantar and Achilles calcaneal spurs.  Osteopenia.  Vascular calcifications.  Accessory ossicle adjacent to the navicular bone.                               CT Renal Stone Study ABD Pelvis WO (Final result)  Result time 09/18/18 18:31:38    Final result by Nicolas Sawyer MD (09/18/18 18:31:38)                 Impression:      1.  Negative for acute process involving the abdomen or pelvis.  Negative for inflammatory changes.  Normal appendix.  Negative for renal stone disease or hydronephrosis, in this patient who has atrophic native kidneys and a right lower quadrant transplant kidney.    2. Again seen is mild prominence of the transplant kidney pelvocaliceal system, without dilation of the ureter, likely a chronic finding.    3.  Mild distention of the gallbladder, which is otherwise normal, nonspecific finding.    4.  Fluid within the nondilated colon, which can be seen in patients with diarrhea or gastroenteritis.  Clinical correlation is advised.    5.  Dependent atelectasis in the lung bases.    6.  Moderate sliding-type hiatal hernia with possible distal esophageal thickening.  Does the patient have symptoms of esophagitis?    7.  Stable findings as noted above.    All CT scans at this facility are performed  using dose modulation techniques as appropriate to performed exam including the following:  automated exposure control; adjustment of mA and/or kV according to the patients size (this includes techniques or  standardized protocols for targeted exams where dose is matched to indication/reason for exam: i.e. extremities or head);  iterative reconstruction technique.      Electronically signed by: Nicolas Sawyer MD  Date:    09/18/2018  Time:    18:31             Narrative:    EXAMINATION:  CT RENAL STONE STUDY ABD PELVIS WO    CLINICAL HISTORY:  Flank pain, stone disease suspected;    TECHNIQUE:  Axial images through the abdomen and pelvis were obtained without the use of IV contrast.  Sagittal and coronal reconstructions are provided for review.  Oral contrast was not utilized.    COMPARISON:  December 11, 2016    FINDINGS:  LUNG BASES: Respiratory motion artifact is present on a number of images.  Subtle abnormalities could be overlooked.  Stable scarring or atelectasis in the inferior lingula and middle lobe.  Lung bases are otherwise clear.  Negative for pleural effusions.  There is a small amount of pericardial fluid.  Coronary artery calcifications.  Moderate size hiatal hernia with thickening of the distal esophagus.    ABDOMEN: The gallbladder is mildly distended.  No surrounding inflammatory changes or wall thickening is seen.  Bilateral perinephric stranding densities noted, with mild atrophy of both kidneys again seen.  Vascular calcifications within the spleen.  Again seen is mild prominence of the biliary tree measuring up to 8 mm.  The liver, spleen and gallbladder otherwise appear normal.  The pancreas is unremarkable.  Kidneys and adrenal glands are otherwise normal.  Negative for hydronephrosis or renal stone disease.  Significant vascular calcifications are present within the renal pelves, with bilateral renal sinus lipomatosis again seen.    There remains to be a transplant kidney in the right anterior hemipelvis.  Mild prominence of the renal collecting system is again seen.  Negative for perinephric stranding densities.  Negative for definite renal stones.    Negative for adenopathy, free fluid or  inflammatory change noted within the abdomen or pelvis.  Vascular calcifications are present without aneurysmal changes, with the aorta measuring a maximum of 2.9 cm.    The small bowel appears normal. Normal appendix.  There is fluid within the proximal and mid colon, without dilation or surrounding inflammatory changes.    PELVIS: The urinary bladder is mildly thick wall, a stable finding.  Prostate calcifications.  The   pelvic organs are breast of e-mail.  There are pelvic phleboliths.    No significant osseous abnormality is identified.  Negative for significant spinal canal stenosis. Similar degree of degenerative disc changes throughout the thoracic and lumbar spine, most significantly involving L3/L4.    Negative for groin adenopathy.    Postoperative changes involve the right pelvic abdominal wall from the transplant kidney placement.  The abdominal wall is intact.                               X-Ray Chest 1 View (Final result)  Result time 09/18/18 17:03:13    Final result by ANA CRISTINA Joseph Sr., MD (09/18/18 17:03:13)                 Impression:      1. There is a mild amount of haziness in the lateral aspect of the base of the left hemithorax.  This is characteristic of atelectasis or subtle pneumonia.  2. The left costophrenic angle is blunted.  This is characteristic of a tiny pleural effusion.  3. There is a mild amount of levoconvex curvature of the thoracic spine.  4. There is mild cardiomegaly.  .      Electronically signed by: Porter Joseph MD  Date:    09/18/2018  Time:    17:03             Narrative:    EXAMINATION:  XR CHEST 1 VIEW    CLINICAL HISTORY:  Adult failure to thrive    COMPARISON:  07/20/2018    FINDINGS:  The size of the heart is prominent.  There is a mild amount of haziness in the lateral aspect of the base of the left hemithorax.  The right lung is clear.  There is no pneumothorax.  The left costophrenic angle is blunted.  The right costophrenic angle is sharp.  There is a  mild amount of levoconvex curvature of the thoracic spine.                                  Assessment/Plan:     Gangrene of left foot    S/p left TMA on 18. Podiatry is following.         Osteomyelitis of left foot    Continue antibiotics.         Chronic kidney disease (CKD), stage III (moderate)    Patient with CKD 3. Creatinine has remained at his baseline.          donor kidney transplant for DM 16    Patient's renal function stable with creatinine at 1.8.     Continue immunosuppression with Prograf, prednisone.     Will monitor closely with repeat renal panel.    Tacrolimus at goal.             Thank you for your consult. I will follow-up with patient. Please contact us if you have any additional questions.    Bud Tam MD  Nephrology  [facility]

## 2018-09-22 NOTE — CONSULTS
[facility]  Infectious Disease  Consult Note    Patient Name: Lavelle Ladd  MRN: [id number]  Admission Date: 9/18/2018  Hospital Length of Stay: 4 days  Attending Physician: Chapin Proctor MD  Primary Care Provider: Rishabh Esteban MD     Isolation Status: No active isolations    Patient information was obtained from patient, past medical records and ER records.      Consults  Assessment/Plan:     * Gas gangrene of foot    S/P TMA of left foot .  Blood cultures are positive for MSSA.     Will continue Zosyn.Stop Vancomycin as there is no MRSA.        Bacteremia    Will continue IV Zosyn.  Source is likely the left foot infection.  Will repeat blood cultures and complete 2 weeks of IV therapy .         Osteomyelitis of left foot    S/p TMA -will need therapy for 2 weeks for MSSA bacteremia         Type 2 diabetes mellitus with retinopathy, with long-term current use of insulin    Continue insulin sliding scale , diabetic diet             Thank you for your consult. I will follow-up with patient. Please contact us if you have any additional questions.    Ronny Veliz MD  Infectious Disease  [facility]    Subjective:     Principal Problem: Gas gangrene of foot    HPI:  63 year old man s/p  kidney transplant patient with hx of HTN, CAD, diastolic heart failure, Type 2 DM with diabetic neuropathy, ESRD, PVD, COPD who was brought to ED via EMS secondary to failure to thrive .He was noted to be covered in human and dog feces when he seen by EMS  Since admission, imaging test showed 1.  Progressive erosive changes with bone loss involving the 1st distal phalanx concerning for osteomyelitis.2.  Air bubbles within the left 5th toe, concerning for possible infectious process or gangrene.3.  Progressive soft tissue loss involving the 4th toe, which could also indicate gangrene.    Since admission, he was seen by podiatry and had  open TMA left lower extremity with gangrene and purulent  drainage noted intraoperatively..  Blood cultures are positive for MSSA.      Past Medical History:   Diagnosis Date    DINORAH (acute kidney injury) 2016    Arthritis     Chronic obstructive pulmonary disease 2016    Coronary artery disease involving native coronary artery of native heart without angina pectoris 2016     donor kidney transplant for DM 16     Induction with Thymo x3 and IV solumedrol to total 875mg  Kidney Biopsy  2016: 16 glomeruli, ACR type 1 AVR type 2, significant microcirculatory changes, c4d negative, No DSA, 5 to10% fibrosis. Treated with thymo x8 2016- no rejection      Diastolic heart failure     Encounter for blood transfusion     ESRD on RRT since 10/2013 10/29/2013    Biopsy proven diabetic nephropathy and lymphoplasmacytic interstitial infiltrate not c/w with AIN (ddx sjogrens or assoc with tamm-horsefall protein extravasation)     GERD (gastroesophageal reflux disease)     History of hepatitis C, s/p successful Rx w/ SVR12 - 2017    Completed 12 weeks harvoni w/ SVR    Hyperlipidemia     Hypertension     PIC line (peripherally inserted central catheter) flush     Prophylactic immunotherapy     Proteinuria     PVD (peripheral vascular disease) 2017    RLE BKA CT 16 Extensive atherosclerotic disease of the aorta and mesenteric arteries.     Renal hypertension     Type 2 diabetes mellitus with diabetic neuropathy, with long-term current use of insulin 2016    Vitamin B12 deficiency        Past Surgical History:   Procedure Laterality Date    AORTOGRAPHY WITH SERIALOGRAPHY N/A 2018    Procedure: LEFT LEG ANGIOGRAM;  Surgeon: Donal Mcdonald MD;  Location[facility] CATH LAB;  Service: Vascular;  Laterality: N/A;    av bovine graft      Left UE    AV FISTULA PLACEMENT      left UE    BIOPSY Tranplanted Kidney N/A 8/15/2016    Performed by Paulette Hanna [facility] at [facility] OR 2ND FLR    BIOPSY, LIVER, TRANSJUGULAR  "APPROACH N/A 4/24/2014    [facility]    CARDIAC CATHETERIZATION  02/2015    INSERTION, CATHETER, VASCULAR N/A 10/31/2013    Performed by Ralph Mckeon MD at [facility] CATH LAB    IRRIGATION AND DEBRIDEMENT Left 7/9/2018    Performed by Katerina Magaña DPM at [facility] OR    KIDNEY TRANSPLANT  05/21/2016    LEFT LEG ANGIOGRAM N/A 6/14/2018    Performed by Donal Mcdonald MD at [facility] CATH LAB    LEG AMPUTATION THROUGH KNEE  2011    right LE, started as nail puncture leading to diabetic ulcer    TRANSPLANT-KIDNEY Right 5/21/2016    Performed by [facility]ny Bergeron MD at [facility] OR [address] [place] [address]       Review of patient's allergies indicates:  No Known Allergies    Medications:  Medications Prior to Admission   Medication Sig    amLODIPine (NORVASC) 2.5 MG tablet Take 1 tablet (2.5 mg total) by mouth once daily.    aspirin (ECOTRIN) 81 MG EC tablet Take 1 tablet (81 mg total) by mouth once daily.    BD INSULIN PEN NEEDLE UF SHORT 31 gauge x 5/16" Ndle     BD INSULIN SYRINGE ULTRA-FINE 1 mL 31 gauge x 5/16 Syrg USE ONE AS DIRECTED FOUR TIMES DAILY WITH MEALS AND AT BEDTIME    blood sugar diagnostic Strp 1 each by Misc.(Non-Drug; Combo Route) route 3 (three) times daily.    blood-glucose meter kit Use as instructed    budesonide-formoterol 160-4.5 mcg (SYMBICORT) 160-4.5 mcg/actuation HFAA Inhale 2 puffs into the lungs every 12 (twelve) hours. Wash out mouth after using    ergocalciferol (ERGOCALCIFEROL) 50,000 unit Cap Take 1 capsule (50,000 Units total) by mouth every 7 days. Take on Mondays    gabapentin (NEURONTIN) 300 MG capsule     HYDROcodone-acetaminophen (NORCO)  mg per tablet Take 1 tablet by mouth every 6 (six) hours as needed.    inhalation device (BREATHERITE VALVED MDI CHAMBER) Use as directed for inhalation.    insulin NPH (NOVOLIN N) 100 unit/mL injection Take 25 units subq with breakfast and 15 units at bedtime    lancets Misc 1 each by Misc.(Non-Drug; Combo " "Route) route 3 (three) times daily.    NOVOLIN R REGULAR U-100 INSULN 100 unit/mL injection INJECT 6 UNITS WITH BREAKFAST AND 8 UNITS WITH DINNER, SUBCUTANEOUSLY 30 MIN BEFORE MEAL.    ondansetron (ZOFRAN-ODT) 4 MG TbDL Take 1 tablet (4 mg total) by mouth every 8 (eight) hours as needed.    pen needle, diabetic (EASY COMFORT PEN NEEDLES) 32 gauge x 5/32" Ndle Inject 1 each into the skin 3 (three) times daily.    pen needle, diabetic 31 gauge x 1/4" Ndle 1 each by Misc.(Non-Drug; Combo Route) route 4 (four) times daily.    predniSONE (DELTASONE) 5 MG tablet Take 1 tablet (5 mg total) by mouth once daily.    sodium bicarbonate 650 MG tablet Take 4 tablets (2,600 mg total) by mouth 2 (two) times daily.    tacrolimus (PROGRAF) 0.5 MG Cap Take 3 capsules (1.5 mg total) by mouth every 12 (twelve) hours. Z94.0 Kidney Transplant    zolpidem (AMBIEN) 5 MG Tab Take 2 tablets (10 mg total) by mouth nightly as needed.     Antibiotics (From admission, onward)    Start     Stop Route Frequency Ordered    09/21/18 2100  piperacillin-tazobactam 4.5 g in dextrose 5 % 100 mL IVPB (ready to mix system)      -- IV Every 8 hours (non-standard times) 09/21/18 1955        Antifungals (From admission, onward)    None        Antivirals (From admission, onward)    None           Immunization History   Administered Date(s) Administered    Hepatitis A 03/18/2014       Family History     Problem Relation (Age of Onset)    Cancer Father    Diabetes Father    Heart failure Father, Mother    Stroke Father        Social History     Socioeconomic History    Marital status: Single     Spouse name: None    Number of children: None    Years of education: None    Highest education level: None   Social Needs    Financial resource strain: None    Food insecurity - worry: None    Food insecurity - inability: None    Transportation needs - medical: None    Transportation needs - non-medical: None   Occupational History    Occupation: " Disabled     Employer: DISABLED   Tobacco Use    Smoking status: Former Smoker     Packs/day: 1.00     Years: 40.00     Pack years: 40.00     Last attempt to quit: 2013     Years since quittin.6    Smokeless tobacco: Never Used   Substance and Sexual Activity    Alcohol use: Yes     Alcohol/week: 3.6 oz     Types: 6 Cans of beer per week     Comment: 6 beers/week    Drug use: No    Sexual activity: No   Other Topics Concern    None   Social History Narrative    Lives alone. Retired from construction and various jobs, now retired. Would like to return to work PT to alleviate boredom.     Review of Systems   Constitutional: Negative for chills, diaphoresis, fatigue and fever.   HENT: Negative for congestion, postnasal drip, rhinorrhea and sore throat.    Eyes: Negative for pain and visual disturbance.   Respiratory: Negative for cough, shortness of breath and wheezing.    Cardiovascular: Negative for chest pain, palpitations and leg swelling.   Gastrointestinal: Negative for abdominal distention, abdominal pain, constipation, diarrhea, nausea and vomiting.   Endocrine: Negative for cold intolerance and heat intolerance.   Genitourinary: Negative for difficulty urinating, dysuria and hematuria.   Musculoskeletal: Positive for back pain.   Skin: Positive for wound.   Allergic/Immunologic: Positive for immunocompromised state.   Neurological: Negative for dizziness, syncope, speech difficulty, weakness, light-headedness and headaches.   Hematological: Negative.    Psychiatric/Behavioral: Negative for agitation, behavioral problems and confusion.     Objective:     Vital Signs (Most Recent):  Temp: 98.1 °F (36.7 °C) (18 0743)  Pulse: 82 (18 0743)  Resp: 18 (18 0743)  BP: (!) 145/82 (18 0743)  SpO2: 95 % (18 0743) Vital Signs (24h Range):  Temp:  [96.2 °F (35.7 °C)-98.8 °F (37.1 °C)] 98.1 °F (36.7 °C)  Pulse:  [77-84] 82  Resp:  [10-26] 18  SpO2:  [90 %-100 %] 95 %  BP:  (103-177)/(65-97) 145/82     Weight: 93 kg (205 lb 0.4 oz)  Body mass index is 28.6 kg/m².    Estimated Creatinine Clearance: 47.7 mL/min (A) (based on SCr of 1.8 mg/dL (H)).    Physical Exam   Constitutional: He is oriented to person, place, and time. He appears well-developed. He is cooperative.   HENT:   Head: Normocephalic.   Nose: No rhinorrhea.   Mouth/Throat: Mucous membranes are normal. No oropharyngeal exudate.   Eyes: Pupils are equal, round, and reactive to light.   Neck: No thyroid mass present.   Cardiovascular: Normal rate, regular rhythm, S1 normal, S2 normal and intact distal pulses.   Pulmonary/Chest: Effort normal. No respiratory distress. He has no wheezes.   Abdominal: Soft. Bowel sounds are normal. He exhibits no distension. There is no tenderness. No hernia.   Musculoskeletal: Tenderness: lastpt.   S/p left TMA.    S/p right BKA.   Lymphadenopathy:     He has no cervical adenopathy.   Neurological: He is alert and oriented to person, place, and time.   Skin: Skin is warm and dry.   Psychiatric: He has a normal mood and affect.   Nursing note and vitals reviewed.      Significant Labs:   BMP:   Recent Labs   Lab  09/22/18   0521   GLU  382*   NA  132*   K  4.1   CL  96   CO2  22*   BUN  28*   CREATININE  1.8*   CALCIUM  9.0     CBC:   Recent Labs   Lab  09/21/18   0535  09/22/18   0521   WBC  11.73  12.39   HGB  12.4*  12.3*   HCT  37.8*  38.0*   PLT  255  283     Wound Culture:   Recent Labs   Lab  06/12/18   0912  07/09/18   0856   LABAERO  Skin skylar,  no predominant organism  CITROBACTER FREUNDII  Rare    KLEBSIELLA OXYTOCA  Rare       All pertinent labs within the past 24 hours have been reviewed.    Significant Imaging: I have reviewed all pertinent imaging results/findings within the past 24 hours.

## 2018-09-22 NOTE — PT/OT/SLP PROGRESS
Physical Therapy      Patient Name:  Lavelle Ladd   MRN:  [id number]    Patient not seen today secondary to patient refusal.  PT spent time attempting to obtain patient's weight-bearing status after his surgical procedure yesterday as foot wound was left open with plan to close it in 48-72 hours per doctor's noted.  Charge nurse stated that when the doctor rounds this p.m. they will inquire about weight-bearing status vs bedrest until wound closure.  PT offered patient to do supine therapeutic exercises today; patient refused.  Will follow-up at next visit.    Dinorah Fishman, PT, DPT  9/22/2018  1123

## 2018-09-22 NOTE — SUBJECTIVE & OBJECTIVE
Interval History:     9/21/18: s/p left TMA today. Patient reports fatigue and mild abdominal pain (he had fall and fell on his abdomen, work-up was negative).     9/22/18:Patient states that he has been drinking plenty of fluids. He reports intermittent back pain.             Review of patient's allergies indicates:  No Known Allergies  Current Facility-Administered Medications   Medication Frequency    acetaminophen tablet 650 mg Q4H PRN    amLODIPine tablet 2.5 mg Daily    arformoterol nebulizer solution 15 mcg Q12H    aspirin EC tablet 81 mg Daily    budesonide nebulizer solution 0.5 mg Q12H    dextrose 50% injection 12.5 g PRN    dextrose 50% injection 25 g PRN    gabapentin capsule 100 mg TID    glucagon (human recombinant) injection 1 mg PRN    glucose chewable tablet 16 g PRN    glucose chewable tablet 24 g PRN    hydrALAZINE injection 10 mg Q8H PRN    HYDROcodone-acetaminophen  mg per tablet 1 tablet Q4H PRN    insulin aspart U-100 pen 0-5 Units QID (AC + HS) PRN    insulin detemir U-100 pen 20 Units QHS    nozaseptin (NOZIN) nasal  BID    olanzapine zydis disintegrating tablet 5 mg QHS    ondansetron injection 4 mg Q8H PRN    pantoprazole EC tablet 40 mg Daily    piperacillin-tazobactam 4.5 g in dextrose 5 % 100 mL IVPB (ready to mix system) Q8H    predniSONE tablet 5 mg Daily    sodium bicarbonate tablet 2,600 mg BID    sodium chloride 0.9% flush 5 mL PRN    tacrolimus capsule 1.5 mg BID    traZODone tablet 50 mg Nightly PRN       Objective:     Vital Signs (Most Recent):  Temp: 98.1 °F (36.7 °C) (09/22/18 0743)  Pulse: 71 (09/22/18 0930)  Resp: 18 (09/22/18 0743)  BP: (!) 145/82 (09/22/18 0743)  SpO2: 95 % (09/22/18 0743)  O2 Device (Oxygen Therapy): room air (09/22/18 0743) Vital Signs (24h Range):  Temp:  [96.2 °F (35.7 °C)-98.8 °F (37.1 °C)] 98.1 °F (36.7 °C)  Pulse:  [71-84] 71  Resp:  [10-26] 18  SpO2:  [90 %-100 %] 95 %  BP: (103-177)/(65-93) 145/82      Weight: 93 kg (205 lb 0.4 oz) (09/21/18 1100)  Body mass index is 28.6 kg/m².  Body surface area is 2.16 meters squared.    I/O last 3 completed shifts:  In: 350 [I.V.:350]  Out: 1850 [Urine:1850]    Physical Exam   Constitutional: He is oriented to person, place, and time. He appears well-developed. He is cooperative.   HENT:   Head: Normocephalic.   Nose: No rhinorrhea.   Mouth/Throat: Mucous membranes are normal. No oropharyngeal exudate.   Eyes: Pupils are equal, round, and reactive to light.   Neck: No thyroid mass present.   Cardiovascular: Normal rate, regular rhythm, S1 normal, S2 normal and intact distal pulses.   Pulmonary/Chest: Effort normal. No respiratory distress. He has no wheezes.   Abdominal: Soft. Bowel sounds are normal. He exhibits no distension. There is no tenderness. No hernia.   Musculoskeletal: Tenderness: lastpt.   S/p left TMA.    S/p right BKA.   Lymphadenopathy:     He has no cervical adenopathy.   Neurological: He is alert and oriented to person, place, and time.   Skin: Skin is warm and dry.   Psychiatric: He has a normal mood and affect.       Significant Labs:  Lab Results   Component Value Date    CREATININE 1.8 (H) 09/22/2018    BUN 28 (H) 09/22/2018     (L) 09/22/2018    K 4.1 09/22/2018    CL 96 09/22/2018    CO2 22 (L) 09/22/2018     Lab Results   Component Value Date    .0 (H) 10/30/2017    CALCIUM 9.0 09/22/2018    CAION 1.10 05/30/2016    PHOS 2.1 (L) 09/18/2018     Lab Results   Component Value Date    ALBUMIN 2.5 (L) 09/18/2018     Lab Results   Component Value Date    WBC 12.39 09/22/2018    HGB 12.3 (L) 09/22/2018    HCT 38.0 (L) 09/22/2018    MCV 88 09/22/2018     09/22/2018     Recent Labs   Lab  09/18/18   1446   MG  1.9     Tacrolimus: 5.3 (9/19/18)      Significant Imaging:  Imaging Results          X-Ray Foot Complete Left (Final result)  Result time 09/18/18 19:50:43    Final result by Nicolas Sawyer MD (09/18/18 19:50:43)                  Impression:      1.  Progressive erosive changes with bone loss involving the 1st distal phalanx concerning for osteomyelitis.    2.  Air bubbles within the left 5th toe, concerning for possible infectious process or gangrene.  Clinical correlation is advised.    3.  Progressive soft tissue loss involving the 4th toe, which could also indicate gangrene.  Clinical correlation is advised.    4.  Persistent healing fracture of the 2nd proximal phalanx.  Other stable findings as noted above.      Electronically signed by: Nicolas Sawyer MD  Date:    09/18/2018  Time:    19:50             Narrative:    EXAMINATION:  XR FOOT COMPLETE 3 VIEW LEFT    CLINICAL HISTORY:  Pain in left foot.  Gangrene, not elsewhere classified    TECHNIQUE:  AP, lateral and oblique views of the left foot were performed.    COMPARISON:  August 16, 2018    FINDINGS:  There is been interval bone loss involving the distal 1st phalanx, with overlying soft tissue defect.  Healing fracture of the 2nd proximal phalanx again seen.  There has been progressive soft tissue loss overlying the 4th toe and there are air bubbles in the 5th toe soft tissues.  No other bone loss is seen.    Plantar and Achilles calcaneal spurs.  Osteopenia.  Vascular calcifications.  Accessory ossicle adjacent to the navicular bone.                               CT Renal Stone Study ABD Pelvis WO (Final result)  Result time 09/18/18 18:31:38    Final result by Nicolas Sawyer MD (09/18/18 18:31:38)                 Impression:      1.  Negative for acute process involving the abdomen or pelvis.  Negative for inflammatory changes.  Normal appendix.  Negative for renal stone disease or hydronephrosis, in this patient who has atrophic native kidneys and a right lower quadrant transplant kidney.    2. Again seen is mild prominence of the transplant kidney pelvocaliceal system, without dilation of the ureter, likely a chronic finding.    3.  Mild distention of the gallbladder, which is  otherwise normal, nonspecific finding.    4.  Fluid within the nondilated colon, which can be seen in patients with diarrhea or gastroenteritis.  Clinical correlation is advised.    5.  Dependent atelectasis in the lung bases.    6.  Moderate sliding-type hiatal hernia with possible distal esophageal thickening.  Does the patient have symptoms of esophagitis?    7.  Stable findings as noted above.    All CT scans at this facility are performed  using dose modulation techniques as appropriate to performed exam including the following:  automated exposure control; adjustment of mA and/or kV according to the patients size (this includes techniques or standardized protocols for targeted exams where dose is matched to indication/reason for exam: i.e. extremities or head);  iterative reconstruction technique.      Electronically signed by: Nicolas Sawyer MD  Date:    09/18/2018  Time:    18:31             Narrative:    EXAMINATION:  CT RENAL STONE STUDY ABD PELVIS WO    CLINICAL HISTORY:  Flank pain, stone disease suspected;    TECHNIQUE:  Axial images through the abdomen and pelvis were obtained without the use of IV contrast.  Sagittal and coronal reconstructions are provided for review.  Oral contrast was not utilized.    COMPARISON:  December 11, 2016    FINDINGS:  LUNG BASES: Respiratory motion artifact is present on a number of images.  Subtle abnormalities could be overlooked.  Stable scarring or atelectasis in the inferior lingula and middle lobe.  Lung bases are otherwise clear.  Negative for pleural effusions.  There is a small amount of pericardial fluid.  Coronary artery calcifications.  Moderate size hiatal hernia with thickening of the distal esophagus.    ABDOMEN: The gallbladder is mildly distended.  No surrounding inflammatory changes or wall thickening is seen.  Bilateral perinephric stranding densities noted, with mild atrophy of both kidneys again seen.  Vascular calcifications within the spleen.  Again  seen is mild prominence of the biliary tree measuring up to 8 mm.  The liver, spleen and gallbladder otherwise appear normal.  The pancreas is unremarkable.  Kidneys and adrenal glands are otherwise normal.  Negative for hydronephrosis or renal stone disease.  Significant vascular calcifications are present within the renal pelves, with bilateral renal sinus lipomatosis again seen.    There remains to be a transplant kidney in the right anterior hemipelvis.  Mild prominence of the renal collecting system is again seen.  Negative for perinephric stranding densities.  Negative for definite renal stones.    Negative for adenopathy, free fluid or inflammatory change noted within the abdomen or pelvis.  Vascular calcifications are present without aneurysmal changes, with the aorta measuring a maximum of 2.9 cm.    The small bowel appears normal. Normal appendix.  There is fluid within the proximal and mid colon, without dilation or surrounding inflammatory changes.    PELVIS: The urinary bladder is mildly thick wall, a stable finding.  Prostate calcifications.  The   pelvic organs are breast of e-mail.  There are pelvic phleboliths.    No significant osseous abnormality is identified.  Negative for significant spinal canal stenosis. Similar degree of degenerative disc changes throughout the thoracic and lumbar spine, most significantly involving L3/L4.    Negative for groin adenopathy.    Postoperative changes involve the right pelvic abdominal wall from the transplant kidney placement.  The abdominal wall is intact.                               X-Ray Chest 1 View (Final result)  Result time 09/18/18 17:03:13    Final result by ANA CRISTINA Joseph Sr., MD (09/18/18 17:03:13)                 Impression:      1. There is a mild amount of haziness in the lateral aspect of the base of the left hemithorax.  This is characteristic of atelectasis or subtle pneumonia.  2. The left costophrenic angle is blunted.  This is  characteristic of a tiny pleural effusion.  3. There is a mild amount of levoconvex curvature of the thoracic spine.  4. There is mild cardiomegaly.  .      Electronically signed by: Porter Joseph MD  Date:    09/18/2018  Time:    17:03             Narrative:    EXAMINATION:  XR CHEST 1 VIEW    CLINICAL HISTORY:  Adult failure to thrive    COMPARISON:  07/20/2018    FINDINGS:  The size of the heart is prominent.  There is a mild amount of haziness in the lateral aspect of the base of the left hemithorax.  The right lung is clear.  There is no pneumothorax.  The left costophrenic angle is blunted.  The right costophrenic angle is sharp.  There is a mild amount of levoconvex curvature of the thoracic spine.

## 2018-09-22 NOTE — PLAN OF CARE
Problem: Patient Care Overview  Goal: Individualization & Mutuality  P.T. NOTE: PT CURRENTLY REQUIRES SBA FOR BED MOBILITY, CGA FOR TF'S AND GAIT USING RW   Outcome: Ongoing (interventions implemented as appropriate)  Patient awake and alert, in NAD. Patient had uneventful shift. VS stable. Patient denies pain or SOB. Patient on telemetry, NSR. Patient ambulates with assistance. Fall precautions in place. Bed locked and in lowest position. Yellow fall risk band and non-skid socks on patient. Patient free from fall/injury. Plan of care reviewed with patient. All questions answered. Will continue to monitor.

## 2018-09-22 NOTE — ASSESSMENT & PLAN NOTE
Will continue IV Zosyn.  Source is likely the left foot infection.  Will repeat blood cultures and complete 2 weeks of IV therapy .

## 2018-09-22 NOTE — PROGRESS NOTES
[facility] Hospital Medicine  Progress Note    Patient Name: Lavelle Ladd  MRN: [id number]  Patient Class: IP- Inpatient   Admission Date: 9/18/2018  Length of Stay: 4 days  Attending Physician: Chapin Proctor MD  Primary Care Provider: Rishabh Esteban MD        Subjective:     Principal Problem:Gas gangrene of foot    HPI:  HPI limited due to patient confusion and obtained from Er records.  Lavelle Ladd is a 63 y.o. male kidney transplant patient with hx of HTN, CAD, diastolic heart failure, Type 2 DM with diabetic neuropathy, ESRD, PVD, COPD who was brought to ED via EMS secondary to failure to thrive which has been present for an unknown amount of time. EMS reported that there was human and dog feces in patient's room and that patient does not want to take his medications. His CBG was 348. Patient with lower foot wound to left. Patient evalauted in ER. WBC 16.72, Na 127, Anion 18, Cr. 1.9, Trop 0.036, ,  CT renal:1. Negative for acute process involving the abdomen or pelvis. Negative for inflammatory changes. Normal appendix. Negative for renal stone disease or hydronephrosis, in this patient who has atrophic native kidneys and a right lower quadrant transplant kidney.2. Again seen is mild prominence of the transplant kidney pelvocaliceal system, without dilation of the ureter, likely a chronic finding.3. Mild distention of the gallbladder, which is otherwise normal, nonspecific finding.4. Fluid within the nondilated colon, which can be seen in patients with diarrhea or gastroenteritis. Clinical correlation is advised.5. Dependent atelectasis in the lung bases.6. Moderate sliding-type hiatal hernia with possible distal esophageal thickening. Does the patient have symptoms of esophagitis?7. Stable findings as noted above.  Chest Xray1. There is a mild amount of haziness in the lateral aspect of the base of the left hemithorax.  This is characteristic of atelectasis or subtle  pneumonia.2. The left costophrenic angle is blunted.  This is characteristic of a tiny pleural effusion.3. There is a mild amount of levoconvex curvature of the thoracic spine.4. There is mild cardiomegaly. Xray left foot:1.  Progressive erosive changes with bone loss involving the 1st distal phalanx concerning for osteomyelitis.2.  Air bubbles within the left 5th toe, concerning for possible infectious process or gangrene.  Clinical correlation is advised.3.  Progressive soft tissue loss involving the 4th toe, which could also indicate gangrene.  Clinical correlation is advised. Hosptial medicine consulted. Patient admitted    Hospital Course:  Patient admitted to Telemetry with gas gangrene of the left foot under the care of Hospital Medicine. Started on IV vancomycin and IV zosyn. Vascular surgery was consulted who recommended BKA. Podiatry was consulted who also recommended BKA but pt refuses, instead agreeing to TMA. BC grew staphylococcus aureus - sensitivities pending. ID consulted. Pt on IV vancomycin - Zosyn DC'd. Pt had TMA on 9/21. Surgical incision to be flushed twice daily and podiatry to close at later time, depending on clinical course (likely Monday)    9/22  Patient with some bleeding o/n due to ambulating to BR. Advised patient to remain non-weight bearing and to notify Nsg of assistance needs. Patient declining the need for SNF referral. PT/OT to work with patient. Plan for Wound Closure  On Monday per Podiatry. Patient denies cp / SOB. CM for Post acute needs Wheel Chair / [facility] Follow up.     Interval History: Bleeding noted O/N Denies discomfort    Review of Systems   Constitutional: Positive for activity change and fatigue. Negative for appetite change and fever.   HENT: Negative.  Negative for congestion, sinus pressure and sore throat.    Eyes: Negative.  Negative for photophobia, discharge and visual disturbance.   Respiratory: Negative.  Negative for cough, chest tightness, shortness of  breath and wheezing.    Cardiovascular: Negative.  Negative for chest pain and palpitations.   Gastrointestinal: Negative.  Negative for abdominal pain, blood in stool, constipation, diarrhea, nausea and vomiting.   Endocrine: Negative.    Genitourinary: Negative.    Musculoskeletal: Positive for back pain and myalgias. Negative for neck pain and neck stiffness.   Skin: Positive for wound.   Allergic/Immunologic: Negative.    Neurological: Positive for weakness. Negative for seizures, syncope and headaches.   Hematological: Negative.    Psychiatric/Behavioral: Negative.  Negative for agitation, behavioral problems, hallucinations, self-injury and suicidal ideas.     Objective:     Vital Signs (Most Recent):  Temp: 97.7 °F (36.5 °C) (09/22/18 1129)  Pulse: 76 (09/22/18 1129)  Resp: 18 (09/22/18 1129)  BP: (!) 166/76 (09/22/18 1129)  SpO2: (!) 93 % (09/22/18 1129) Vital Signs (24h Range):  Temp:  [96.2 °F (35.7 °C)-98.8 °F (37.1 °C)] 97.7 °F (36.5 °C)  Pulse:  [71-84] 76  Resp:  [10-26] 18  SpO2:  [90 %-100 %] 93 %  BP: (103-177)/(65-93) 166/76     Weight: 93 kg (205 lb 0.4 oz)  Body mass index is 28.6 kg/m².    Intake/Output Summary (Last 24 hours) at 9/22/2018 1223  Last data filed at 9/22/2018 0100  Gross per 24 hour   Intake 350 ml   Output 1350 ml   Net -1000 ml      Physical Exam   Constitutional: He is oriented to person, place, and time. He appears well-developed and well-nourished.   HENT:   Head: Normocephalic and atraumatic.   Eyes: EOM are normal. Pupils are equal, round, and reactive to light.   Neck: Normal range of motion. Neck supple.   Cardiovascular: Normal rate, regular rhythm and intact distal pulses.   Murmur heard.   Systolic murmur is present with a grade of 3/6.  Pulmonary/Chest: Effort normal and breath sounds normal. No respiratory distress. He has no wheezes.   Abdominal: Soft. Bowel sounds are normal.   Musculoskeletal: Normal range of motion. He exhibits no deformity.        Legs:  Feet:    Left Foot: amputated  Neurological: He is alert and oriented to person, place, and time. He has normal reflexes.   Skin: Skin is warm and dry. Capillary refill takes 2 to 3 seconds.   Psychiatric: He has a normal mood and affect. His behavior is normal. Judgment and thought content normal.   Nursing note and vitals reviewed.      Significant Labs:   BMP:   Recent Labs   Lab  09/22/18 0521   GLU  382*   NA  132*   K  4.1   CL  96   CO2  22*   BUN  28*   CREATININE  1.8*   CALCIUM  9.0     CBC:   Recent Labs   Lab  09/21/18 0535  09/22/18 0521   WBC  11.73  12.39   HGB  12.4*  12.3*   HCT  37.8*  38.0*   PLT  255  283     CMP:   Recent Labs   Lab  09/21/18 0535  09/22/18 0521   NA  132*  132*   K  3.7  4.1   CL  96  96   CO2  25  22*   GLU  274*  382*   BUN  26*  28*   CREATININE  1.6*  1.8*   CALCIUM  9.0  9.0   ANIONGAP  11  14   EGFRNONAA  45*  39*     All pertinent labs within the past 24 hours have been reviewed.    Significant Imaging: I have reviewed all pertinent imaging results/findings within the past 24 hours.    Assessment/Plan:      * Gas gangrene of foot    Vanc/zosyn  Podiatry consult  Source of sepsis    --TMA scheduled for 9/21  --continue IV vanc, dc IV Zosyn  --BC + staph aureus  --ID consulted  --repeat BC drawn 9/21 9/22  ABX  Podiatry  S/p TMA  ID  Plan for Home Support - PICC / WC / [facility]  CM on Consult        Bacteremia    --BC grew STAPHYLOCOCCUS AUREUS, sensitivities pending   --continue IV Vancomycin  --ID consulted  --repeat bc drawn 9/21 9/22  ABX  ID  Monitor  PICC  Home IV abx infusion          Osteomyelitis of left foot    Vanc/zosyn  poditary consult   --TMA schedueled 9/21 9/22  Plan for wound closure on Monday  Podiatry  Plan for post acute care  Patient declining SNF placement  CM on consult          Failure to thrive in adult    --registered dietitian following    9/22  Diet  Monitor              Diabetes mellitus type 2, uncontrolled, with complications     Hgb A1c 8.0  --accuchecks and SSI  --Levemir 15U qd      Increase Levemir to 25 units            Sepsis    Not severe sepsis. Patients kidney function baseline with kidney transplant and troponin elevation related to CKD.   Continue IV antx therapy with vanc and zosyn for suspected source of foot wound  --BC grew STAPHYLOCOCCUS AUREUS, sensitivities pending   --continue IV Vancomycin, DC IV Zosyn  --ID consulted  --repeat bc drawn   Monitor labs      Monitor BC  PICC  IV abx x 2 weeks  ID  [facility]        PVD (peripheral vascular disease)    --vascular and podiatry following  --TMA today ()  --continue daily  ASA          Type 2 diabetes mellitus with retinopathy, with long-term current use of insulin      Increase Levemir  NISS  Accuchecks          Chronic kidney disease (CKD), stage III (moderate)    Dr. Estrada with renal consulted due to patient with transplant.  He reports patient can stay here  Renal consulted   Monitor rfp      [facility]  Nephrology            donor kidney transplant for DM 16    --Nephrology following  --see above        Essential hypertension    Hold bp meds at this time due to sepsis   Monitor trends and restart once need    -sepsis status improving.  -Restart BP meds.  -Monitor VS      CCB  Monitor          VTE Risk Mitigation (From admission, onward)        Ordered     Place GOPI hose  Until discontinued      18     Place sequential compression device  Until discontinued      18     IP VTE HIGH RISK PATIENT  Once      18              Chapin Proctor MD  Department of Hospital Medicine   [facility]

## 2018-09-22 NOTE — ASSESSMENT & PLAN NOTE
Patient's renal function stable with creatinine at 1.8.     Continue immunosuppression with Prograf, prednisone.     Will monitor closely with repeat renal panel.    Tacrolimus at goal.

## 2018-09-22 NOTE — SUBJECTIVE & OBJECTIVE
Past Medical History:   Diagnosis Date    DINORAH (acute kidney injury) 2016    Arthritis     Chronic obstructive pulmonary disease 2016    Coronary artery disease involving native coronary artery of native heart without angina pectoris 2016     donor kidney transplant for DM 16     Induction with Thymo x3 and IV solumedrol to total 875mg  Kidney Biopsy  2016: 16 glomeruli, ACR type 1 AVR type 2, significant microcirculatory changes, c4d negative, No DSA, 5 to10% fibrosis. Treated with thymo x8 2016- no rejection      Diastolic heart failure     Encounter for blood transfusion     ESRD on RRT since 10/2013 10/29/2013    Biopsy proven diabetic nephropathy and lymphoplasmacytic interstitial infiltrate not c/w with AIN (ddx sjogrens or assoc with tamm-horsefall protein extravasation)     GERD (gastroesophageal reflux disease)     History of hepatitis C, s/p successful Rx w/ SVR12 - 2017    Completed 12 weeks harvoni w/ SVR    Hyperlipidemia     Hypertension     PIC line (peripherally inserted central catheter) flush     Prophylactic immunotherapy     Proteinuria     PVD (peripheral vascular disease) 2017    RLE BKA CT 16 Extensive atherosclerotic disease of the aorta and mesenteric arteries.     Renal hypertension     Type 2 diabetes mellitus with diabetic neuropathy, with long-term current use of insulin 2016    Vitamin B12 deficiency        Past Surgical History:   Procedure Laterality Date    AORTOGRAPHY WITH SERIALOGRAPHY N/A 2018    Procedure: LEFT LEG ANGIOGRAM;  Surgeon: Donal Mcdonald MD;  Location: [facility] CATH LAB;  Service: Vascular;  Laterality: N/A;    av bovine graft      Left UE    AV FISTULA PLACEMENT      left UE    BIOPSY Tranplanted Kidney N/A 8/15/2016    Performed by Paulette Hanna [facility] at [facility] OR 2ND FLR    BIOPSY, LIVER, TRANSJUGULAR APPROACH N/A 2014    Performed by [facility] Diagnostic Provider at [facility] CATH LAB     "CARDIAC CATHETERIZATION  02/2015    INSERTION, CATHETER, VASCULAR N/A 10/31/2013    Performed by Ralph Mckeon MD at [facility] CATH LAB    IRRIGATION AND DEBRIDEMENT Left 7/9/2018    Performed by Katerina Magaña DPM at [facility] OR    KIDNEY TRANSPLANT  05/21/2016    LEFT LEG ANGIOGRAM N/A 6/14/2018    Performed by Donal Mcdonald MD at [facility] CATH LAB    LEG AMPUTATION THROUGH KNEE  2011    right LE, started as nail puncture leading to diabetic ulcer    TRANSPLANT-KIDNEY Right 5/21/2016    Performed by [facility]ny Bergeron [facility] at [facility] OR [address] [place] [address]       Review of patient's allergies indicates:  No Known Allergies    Medications:  Medications Prior to Admission   Medication Sig    amLODIPine (NORVASC) 2.5 MG tablet Take 1 tablet (2.5 mg total) by mouth once daily.    aspirin (ECOTRIN) 81 MG EC tablet Take 1 tablet (81 mg total) by mouth once daily.    BD INSULIN PEN NEEDLE UF SHORT 31 gauge x 5/16" Ndle     BD INSULIN SYRINGE ULTRA-FINE 1 mL 31 gauge x 5/16 Syrg USE ONE AS DIRECTED FOUR TIMES DAILY WITH MEALS AND AT BEDTIME    blood sugar diagnostic Strp 1 each by Misc.(Non-Drug; Combo Route) route 3 (three) times daily.    blood-glucose meter kit Use as instructed    budesonide-formoterol 160-4.5 mcg (SYMBICORT) 160-4.5 mcg/actuation HFAA Inhale 2 puffs into the lungs every 12 (twelve) hours. Wash out mouth after using    ergocalciferol (ERGOCALCIFEROL) 50,000 unit Cap Take 1 capsule (50,000 Units total) by mouth every 7 days. Take on Mondays    gabapentin (NEURONTIN) 300 MG capsule     HYDROcodone-acetaminophen (NORCO)  mg per tablet Take 1 tablet by mouth every 6 (six) hours as needed.    inhalation device (BREATHERITE VALVED MDI CHAMBER) Use as directed for inhalation.    insulin NPH (NOVOLIN N) 100 unit/mL injection Take 25 units subq with breakfast and 15 units at bedtime    lancets Misc 1 each by Misc.(Non-Drug; Combo Route) route 3 (three) times daily.    NOVOLIN R REGULAR U-100 INSULN 100 unit/mL " "injection INJECT 6 UNITS WITH BREAKFAST AND 8 UNITS WITH DINNER, SUBCUTANEOUSLY 30 MIN BEFORE MEAL.    ondansetron (ZOFRAN-ODT) 4 MG TbDL Take 1 tablet (4 mg total) by mouth every 8 (eight) hours as needed.    pen needle, diabetic (EASY COMFORT PEN NEEDLES) 32 gauge x 5/32" Ndle Inject 1 each into the skin 3 (three) times daily.    pen needle, diabetic 31 gauge x 1/4" Ndle 1 each by Misc.(Non-Drug; Combo Route) route 4 (four) times daily.    predniSONE (DELTASONE) 5 MG tablet Take 1 tablet (5 mg total) by mouth once daily.    sodium bicarbonate 650 MG tablet Take 4 tablets (2,600 mg total) by mouth 2 (two) times daily.    tacrolimus (PROGRAF) 0.5 MG Cap Take 3 capsules (1.5 mg total) by mouth every 12 (twelve) hours. Z94.0 Kidney Transplant    zolpidem (AMBIEN) 5 MG Tab Take 2 tablets (10 mg total) by mouth nightly as needed.     Antibiotics (From admission, onward)    Start     Stop Route Frequency Ordered    09/21/18 2100  piperacillin-tazobactam 4.5 g in dextrose 5 % 100 mL IVPB (ready to mix system)      -- IV Every 8 hours (non-standard times) 09/21/18 1955        Antifungals (From admission, onward)    None        Antivirals (From admission, onward)    None           Immunization History   Administered Date(s) Administered    Hepatitis A 03/18/2014       Family History     Problem Relation (Age of Onset)    Cancer Father    Diabetes Father    Heart failure Father, Mother    Stroke Father        Social History     Socioeconomic History    Marital status: Single     Spouse name: None    Number of children: None    Years of education: None    Highest education level: None   Social Needs    Financial resource strain: None    Food insecurity - worry: None    Food insecurity - inability: None    Transportation needs - medical: None    Transportation needs - non-medical: None   Occupational History    Occupation: Disabled     Employer: DISABLED   Tobacco Use    Smoking status: Former Smoker     " Packs/day: 1.00     Years: 40.00     Pack years: 40.00     Last attempt to quit: 2013     Years since quittin.6    Smokeless tobacco: Never Used   Substance and Sexual Activity    Alcohol use: Yes     Alcohol/week: 3.6 oz     Types: 6 Cans of beer per week     Comment: 6 beers/week    Drug use: No    Sexual activity: No   Other Topics Concern    None   Social History Narrative    Lives alone. Retired from construction and various jobs, now retired. Would like to return to work PT to alleviate boredom.     Review of Systems   Constitutional: Negative for chills, diaphoresis, fatigue and fever.   HENT: Negative for congestion, postnasal drip, rhinorrhea and sore throat.    Eyes: Negative for pain and visual disturbance.   Respiratory: Negative for cough, shortness of breath and wheezing.    Cardiovascular: Negative for chest pain, palpitations and leg swelling.   Gastrointestinal: Negative for abdominal distention, abdominal pain, constipation, diarrhea, nausea and vomiting.   Endocrine: Negative for cold intolerance and heat intolerance.   Genitourinary: Negative for difficulty urinating, dysuria and hematuria.   Musculoskeletal: Positive for back pain.   Skin: Positive for wound.   Allergic/Immunologic: Positive for immunocompromised state.   Neurological: Negative for dizziness, syncope, speech difficulty, weakness, light-headedness and headaches.   Hematological: Negative.    Psychiatric/Behavioral: Negative for agitation, behavioral problems and confusion.     Objective:     Vital Signs (Most Recent):  Temp: 98.1 °F (36.7 °C) (18 0743)  Pulse: 82 (18 0743)  Resp: 18 (18 0743)  BP: (!) 145/82 (18 0743)  SpO2: 95 % (18 0743) Vital Signs (24h Range):  Temp:  [96.2 °F (35.7 °C)-98.8 °F (37.1 °C)] 98.1 °F (36.7 °C)  Pulse:  [77-84] 82  Resp:  [10-26] 18  SpO2:  [90 %-100 %] 95 %  BP: (103-177)/(65-97) 145/82     Weight: 93 kg (205 lb 0.4 oz)  Body mass index is 28.6  kg/m².    Estimated Creatinine Clearance: 47.7 mL/min (A) (based on SCr of 1.8 mg/dL (H)).    Physical Exam   Constitutional: He is oriented to person, place, and time. He appears well-developed. He is cooperative.   HENT:   Head: Normocephalic.   Nose: No rhinorrhea.   Mouth/Throat: Mucous membranes are normal. No oropharyngeal exudate.   Eyes: Pupils are equal, round, and reactive to light.   Neck: No thyroid mass present.   Cardiovascular: Normal rate, regular rhythm, S1 normal, S2 normal and intact distal pulses.   Pulmonary/Chest: Effort normal. No respiratory distress. He has no wheezes.   Abdominal: Soft. Bowel sounds are normal. He exhibits no distension. There is no tenderness. No hernia.   Musculoskeletal: Tenderness: lastpt.   S/p left TMA.    S/p right BKA.   Lymphadenopathy:     He has no cervical adenopathy.   Neurological: He is alert and oriented to person, place, and time.   Skin: Skin is warm and dry.   Psychiatric: He has a normal mood and affect.   Nursing note and vitals reviewed.      Significant Labs:   BMP:   Recent Labs   Lab  09/22/18   0521   GLU  382*   NA  132*   K  4.1   CL  96   CO2  22*   BUN  28*   CREATININE  1.8*   CALCIUM  9.0     CBC:   Recent Labs   Lab  09/21/18   0535  09/22/18   0521   WBC  11.73  12.39   HGB  12.4*  12.3*   HCT  37.8*  38.0*   PLT  255  283     Wound Culture:   Recent Labs   Lab  06/12/18   0912  07/09/18   0856   LABAERO  Skin skylar,  no predominant organism  CITROBACTER FREUNDII  Rare    KLEBSIELLA OXYTOCA  Rare       All pertinent labs within the past 24 hours have been reviewed.    Significant Imaging: I have reviewed all pertinent imaging results/findings within the past 24 hours.

## 2018-09-22 NOTE — PT/OT/SLP PROGRESS
Occupational Therapy      Patient Name:  Lavelle Ladd   MRN:  [id number]    Patient not seen today secondary to pt refused. Will follow-up on next visit.    Lola Quintero OT  9/22/2018   1123

## 2018-09-22 NOTE — NURSING
"Entered pt room for hourly rounds to find pt sitting at edge of bed, bathroom door open, and a blood trail from the toilet to the bed. Pt states he "thought he could make it to the bathroom by himself" with use of his walker. Pt had partial amputation of his left foot on 9/20. Bandages to the site were dripping with fresh blood. ACE wrap removed, dressing reinforced with gauze, and wrap reapplied. Pt verbalized an understanding of not getting out of bed without assistance, and importance of remaining on bedrest during healing process. Will continue to monitor.   "

## 2018-09-22 NOTE — HPI
63 year old man s/p  kidney transplant patient with hx of HTN, CAD, diastolic heart failure, Type 2 DM with diabetic neuropathy, ESRD, PVD, COPD who was brought to ED via EMS secondary to failure to thrive .He was noted to be covered in human and dog feces when he seen by EMS  Since admission, imaging test showed 1.  Progressive erosive changes with bone loss involving the 1st distal phalanx concerning for osteomyelitis.2.  Air bubbles within the left 5th toe, concerning for possible infectious process or gangrene.3.  Progressive soft tissue loss involving the 4th toe, which could also indicate gangrene.    Since admission, he was seen by podiatry and had  open TMA left lower extremity with gangrene and purulent drainage noted intraoperatively..  Blood cultures are positive for MSSA.

## 2018-09-22 NOTE — CONSULTS
Home health choice form obtained for [facility] and referral made via [facility]. Infusion referral made to [facility] via [facility]. PICC not yet in place. Patient has [employer] and is not eligible for [facility].

## 2018-09-22 NOTE — SUBJECTIVE & OBJECTIVE
Interval History: Bleeding noted O/N Denies discomfort    Review of Systems   Constitutional: Positive for activity change and fatigue. Negative for appetite change and fever.   HENT: Negative.  Negative for congestion, sinus pressure and sore throat.    Eyes: Negative.  Negative for photophobia, discharge and visual disturbance.   Respiratory: Negative.  Negative for cough, chest tightness, shortness of breath and wheezing.    Cardiovascular: Negative.  Negative for chest pain and palpitations.   Gastrointestinal: Negative.  Negative for abdominal pain, blood in stool, constipation, diarrhea, nausea and vomiting.   Endocrine: Negative.    Genitourinary: Negative.    Musculoskeletal: Positive for back pain and myalgias. Negative for neck pain and neck stiffness.   Skin: Positive for wound.   Allergic/Immunologic: Negative.    Neurological: Positive for weakness. Negative for seizures, syncope and headaches.   Hematological: Negative.    Psychiatric/Behavioral: Negative.  Negative for agitation, behavioral problems, hallucinations, self-injury and suicidal ideas.     Objective:     Vital Signs (Most Recent):  Temp: 97.7 °F (36.5 °C) (09/22/18 1129)  Pulse: 76 (09/22/18 1129)  Resp: 18 (09/22/18 1129)  BP: (!) 166/76 (09/22/18 1129)  SpO2: (!) 93 % (09/22/18 1129) Vital Signs (24h Range):  Temp:  [96.2 °F (35.7 °C)-98.8 °F (37.1 °C)] 97.7 °F (36.5 °C)  Pulse:  [71-84] 76  Resp:  [10-26] 18  SpO2:  [90 %-100 %] 93 %  BP: (103-177)/(65-93) 166/76     Weight: 93 kg (205 lb 0.4 oz)  Body mass index is 28.6 kg/m².    Intake/Output Summary (Last 24 hours) at 9/22/2018 1223  Last data filed at 9/22/2018 0100  Gross per 24 hour   Intake 350 ml   Output 1350 ml   Net -1000 ml      Physical Exam   Constitutional: He is oriented to person, place, and time. He appears well-developed and well-nourished.   HENT:   Head: Normocephalic and atraumatic.   Eyes: EOM are normal. Pupils are equal, round, and reactive to light.   Neck:  Normal range of motion. Neck supple.   Cardiovascular: Normal rate, regular rhythm and intact distal pulses.   Murmur heard.   Systolic murmur is present with a grade of 3/6.  Pulmonary/Chest: Effort normal and breath sounds normal. No respiratory distress. He has no wheezes.   Abdominal: Soft. Bowel sounds are normal.   Musculoskeletal: Normal range of motion. He exhibits no deformity.        Legs:  Feet:   Left Foot: amputated  Neurological: He is alert and oriented to person, place, and time. He has normal reflexes.   Skin: Skin is warm and dry. Capillary refill takes 2 to 3 seconds.   Psychiatric: He has a normal mood and affect. His behavior is normal. Judgment and thought content normal.   Nursing note and vitals reviewed.      Significant Labs:   BMP:   Recent Labs   Lab  09/22/18   0521   GLU  382*   NA  132*   K  4.1   CL  96   CO2  22*   BUN  28*   CREATININE  1.8*   CALCIUM  9.0     CBC:   Recent Labs   Lab  09/21/18   0535  09/22/18   0521   WBC  11.73  12.39   HGB  12.4*  12.3*   HCT  37.8*  38.0*   PLT  255  283     CMP:   Recent Labs   Lab  09/21/18   0535  09/22/18   0521   NA  132*  132*   K  3.7  4.1   CL  96  96   CO2  25  22*   GLU  274*  382*   BUN  26*  28*   CREATININE  1.6*  1.8*   CALCIUM  9.0  9.0   ANIONGAP  11  14   EGFRNONAA  45*  39*     All pertinent labs within the past 24 hours have been reviewed.    Significant Imaging: I have reviewed all pertinent imaging results/findings within the past 24 hours.

## 2018-09-23 PROBLEM — E83.39 HYPOPHOSPHATEMIA: Status: ACTIVE | Noted: 2018-09-23

## 2018-09-23 LAB
ANION GAP SERPL CALC-SCNC: 12 MMOL/L
BASOPHILS # BLD AUTO: 0.01 K/UL
BASOPHILS NFR BLD: 0.1 %
BUN SERPL-MCNC: 25 MG/DL
CALCIUM SERPL-MCNC: 9.1 MG/DL
CHLORIDE SERPL-SCNC: 101 MMOL/L
CO2 SERPL-SCNC: 25 MMOL/L
CREAT SERPL-MCNC: 1.5 MG/DL
DIFFERENTIAL METHOD: ABNORMAL
EOSINOPHIL # BLD AUTO: 0.1 K/UL
EOSINOPHIL NFR BLD: 0.8 %
ERYTHROCYTE [DISTWIDTH] IN BLOOD BY AUTOMATED COUNT: 13.6 %
EST. GFR  (AFRICAN AMERICAN): 56 ML/MIN/1.73 M^2
EST. GFR  (NON AFRICAN AMERICAN): 48 ML/MIN/1.73 M^2
GLUCOSE SERPL-MCNC: 151 MG/DL
HCT VFR BLD AUTO: 39.3 %
HGB BLD-MCNC: 12.7 G/DL
LYMPHOCYTES # BLD AUTO: 1.5 K/UL
LYMPHOCYTES NFR BLD: 16.6 %
MCH RBC QN AUTO: 28.7 PG
MCHC RBC AUTO-ENTMCNC: 32.3 G/DL
MCV RBC AUTO: 89 FL
MONOCYTES # BLD AUTO: 0.8 K/UL
MONOCYTES NFR BLD: 9.3 %
NEUTROPHILS # BLD AUTO: 6.4 K/UL
NEUTROPHILS NFR BLD: 73.2 %
PLATELET # BLD AUTO: 292 K/UL
PMV BLD AUTO: 10.2 FL
POCT GLUCOSE: 106 MG/DL (ref 70–110)
POCT GLUCOSE: 199 MG/DL (ref 70–110)
POCT GLUCOSE: 209 MG/DL (ref 70–110)
POCT GLUCOSE: 291 MG/DL (ref 70–110)
POCT GLUCOSE: 312 MG/DL (ref 70–110)
POTASSIUM SERPL-SCNC: 3.6 MMOL/L
RBC # BLD AUTO: 4.43 M/UL
SODIUM SERPL-SCNC: 138 MMOL/L
WBC # BLD AUTO: 8.75 K/UL

## 2018-09-23 PROCEDURE — 25000003 PHARM REV CODE 250: Performed by: INTERNAL MEDICINE

## 2018-09-23 PROCEDURE — 25000003 PHARM REV CODE 250: Performed by: NURSE PRACTITIONER

## 2018-09-23 PROCEDURE — 63600175 PHARM REV CODE 636 W HCPCS: Performed by: NURSE PRACTITIONER

## 2018-09-23 PROCEDURE — 21400001 HC TELEMETRY ROOM

## 2018-09-23 PROCEDURE — 85025 COMPLETE CBC W/AUTO DIFF WBC: CPT

## 2018-09-23 PROCEDURE — 99900035 HC TECH TIME PER 15 MIN (STAT)

## 2018-09-23 PROCEDURE — 63600175 PHARM REV CODE 636 W HCPCS: Performed by: INTERNAL MEDICINE

## 2018-09-23 PROCEDURE — 36415 COLL VENOUS BLD VENIPUNCTURE: CPT

## 2018-09-23 PROCEDURE — 80048 BASIC METABOLIC PNL TOTAL CA: CPT

## 2018-09-23 PROCEDURE — 99233 SBSQ HOSP IP/OBS HIGH 50: CPT | Mod: ,,, | Performed by: INTERNAL MEDICINE

## 2018-09-23 PROCEDURE — 94761 N-INVAS EAR/PLS OXIMETRY MLT: CPT

## 2018-09-23 RX ADMIN — INSULIN ASPART 2 UNITS: 100 INJECTION, SOLUTION INTRAVENOUS; SUBCUTANEOUS at 08:09

## 2018-09-23 RX ADMIN — INSULIN ASPART 2 UNITS: 100 INJECTION, SOLUTION INTRAVENOUS; SUBCUTANEOUS at 12:09

## 2018-09-23 RX ADMIN — METOPROLOL TARTRATE 25 MG: 25 TABLET ORAL at 08:09

## 2018-09-23 RX ADMIN — GABAPENTIN 100 MG: 100 CAPSULE ORAL at 02:09

## 2018-09-23 RX ADMIN — HYDROCODONE BITARTRATE AND ACETAMINOPHEN 1 TABLET: 10; 325 TABLET ORAL at 08:09

## 2018-09-23 RX ADMIN — SODIUM PHOSPHATE, MONOBASIC, MONOHYDRATE 30 MMOL: 276; 142 INJECTION, SOLUTION INTRAVENOUS at 06:09

## 2018-09-23 RX ADMIN — PANTOPRAZOLE SODIUM 40 MG: 40 TABLET, DELAYED RELEASE ORAL at 08:09

## 2018-09-23 RX ADMIN — ASPIRIN 81 MG: 81 TABLET, COATED ORAL at 08:09

## 2018-09-23 RX ADMIN — PIPERACILLIN SODIUM AND TAZOBACTAM SODIUM 4.5 G: 4; .5 INJECTION, POWDER, LYOPHILIZED, FOR SOLUTION INTRAVENOUS at 12:09

## 2018-09-23 RX ADMIN — SODIUM BICARBONATE 650 MG TABLET 2600 MG: at 08:09

## 2018-09-23 RX ADMIN — OLANZAPINE 5 MG: 5 TABLET, ORALLY DISINTEGRATING ORAL at 08:09

## 2018-09-23 RX ADMIN — GABAPENTIN 100 MG: 100 CAPSULE ORAL at 08:09

## 2018-09-23 RX ADMIN — PIPERACILLIN SODIUM AND TAZOBACTAM SODIUM 4.5 G: 4; .5 INJECTION, POWDER, LYOPHILIZED, FOR SOLUTION INTRAVENOUS at 04:09

## 2018-09-23 RX ADMIN — HYDROCODONE BITARTRATE AND ACETAMINOPHEN 1 TABLET: 10; 325 TABLET ORAL at 12:09

## 2018-09-23 RX ADMIN — HYDROCODONE BITARTRATE AND ACETAMINOPHEN 1 TABLET: 10; 325 TABLET ORAL at 02:09

## 2018-09-23 RX ADMIN — AMLODIPINE BESYLATE 5 MG: 5 TABLET ORAL at 08:09

## 2018-09-23 RX ADMIN — MORPHINE SULFATE 3 MG: 4 INJECTION INTRAVENOUS at 08:09

## 2018-09-23 RX ADMIN — TACROLIMUS 1.5 MG: 1 CAPSULE ORAL at 05:09

## 2018-09-23 RX ADMIN — INSULIN ASPART 3 UNITS: 100 INJECTION, SOLUTION INTRAVENOUS; SUBCUTANEOUS at 05:09

## 2018-09-23 RX ADMIN — TACROLIMUS 1.5 MG: 1 CAPSULE ORAL at 08:09

## 2018-09-23 RX ADMIN — MORPHINE SULFATE 3 MG: 4 INJECTION INTRAVENOUS at 01:09

## 2018-09-23 RX ADMIN — MORPHINE SULFATE 3 MG: 4 INJECTION INTRAVENOUS at 05:09

## 2018-09-23 RX ADMIN — PREDNISONE 5 MG: 5 TABLET ORAL at 08:09

## 2018-09-23 NOTE — PLAN OF CARE
Problem: Patient Care Overview  Goal: Plan of Care Review  Outcome: Ongoing (interventions implemented as appropriate)  Pt AAO x4.  VSS.  Pt remained afebrile throughout this shift.   IV antibiotics administered per order.   Pt remained free of falls this shift.   Pt has complaints of right sided hip pain from a fall prior to his hospitalization. Pt also c/o L foot pain.  Pain meds administered as ordered.   Blood glucose monitored, corrected via SSI.  Plan of care reviewed. Patient verbalizes understanding.   Pt moving/turing independently. Frequent weight shifting encouraged.   R BKA, uses prosthetic and walker, both at bedside. L foot non weightbearing.  Patient NSR on monitor.   Bed low, side rails up x 2, wheels locked, call light in reach.   Patient instructed to call for assistance.   Hourly rounding completed.   Will continue to monitor.

## 2018-09-23 NOTE — PROGRESS NOTES
[facility] Hospital Medicine  Progress Note    Patient Name: Lavelle Ladd  MRN: [id number]  Patient Class: IP- Inpatient   Admission Date: 9/18/2018  Length of Stay: 5 days  Attending Physician: Chapin Proctor MD  Primary Care Provider: Rishabh Esteban MD        Subjective:     Principal Problem:Gas gangrene of foot    HPI:  HPI limited due to patient confusion and obtained from Er records.  Lavelle Ladd is a 63 y.o. male kidney transplant patient with hx of HTN, CAD, diastolic heart failure, Type 2 DM with diabetic neuropathy, ESRD, PVD, COPD who was brought to ED via EMS secondary to failure to thrive which has been present for an unknown amount of time. EMS reported that there was human and dog feces in patient's room and that patient does not want to take his medications. His CBG was 348. Patient with lower foot wound to left. Patient evalauted in ER. WBC 16.72, Na 127, Anion 18, Cr. 1.9, Trop 0.036, ,  CT renal:1. Negative for acute process involving the abdomen or pelvis. Negative for inflammatory changes. Normal appendix. Negative for renal stone disease or hydronephrosis, in this patient who has atrophic native kidneys and a right lower quadrant transplant kidney.2. Again seen is mild prominence of the transplant kidney pelvocaliceal system, without dilation of the ureter, likely a chronic finding.3. Mild distention of the gallbladder, which is otherwise normal, nonspecific finding.4. Fluid within the nondilated colon, which can be seen in patients with diarrhea or gastroenteritis. Clinical correlation is advised.5. Dependent atelectasis in the lung bases.6. Moderate sliding-type hiatal hernia with possible distal esophageal thickening. Does the patient have symptoms of esophagitis?7. Stable findings as noted above.  Chest Xray1. There is a mild amount of haziness in the lateral aspect of the base of the left hemithorax.  This is characteristic of atelectasis or subtle  pneumonia.2. The left costophrenic angle is blunted.  This is characteristic of a tiny pleural effusion.3. There is a mild amount of levoconvex curvature of the thoracic spine.4. There is mild cardiomegaly. Xray left foot:1.  Progressive erosive changes with bone loss involving the 1st distal phalanx concerning for osteomyelitis.2.  Air bubbles within the left 5th toe, concerning for possible infectious process or gangrene.  Clinical correlation is advised.3.  Progressive soft tissue loss involving the 4th toe, which could also indicate gangrene.  Clinical correlation is advised. Hosptial medicine consulted. Patient admitted    Hospital Course:  Patient admitted to Telemetry with gas gangrene of the left foot under the care of Hospital Medicine. Started on IV vancomycin and IV zosyn. Vascular surgery was consulted who recommended BKA. Podiatry was consulted who also recommended BKA but pt refuses, instead agreeing to TMA. BC grew staphylococcus aureus - sensitivities pending. ID consulted. Pt on IV vancomycin - Zosyn DC'd. Pt had TMA on 9/21. Surgical incision to be flushed twice daily and podiatry to close at later time, depending on clinical course (likely Monday)    9/22  Patient with some bleeding o/n due to ambulating to BR. Advised patient to remain non-weight bearing and to notify Nsg of assistance needs. Patient declining the need for SNF referral. PT/OT to work with patient. Plan for Wound Closure  On Monday per Podiatry. Patient denies cp / SOB. CM for Post acute needs Wheel Chair / [facility] Follow up.     9/23  Patient improving steadily. No events. C/O rib pain / Foot pain. Plan for wound closure tomorrow.  CM on consult for home needs- patient refusing SNF placement    Interval History: Awaiting wound closure planned for tomorrow. Non Weight Bearing    Review of Systems   Constitutional: Positive for activity change and fatigue. Negative for appetite change and fever.   HENT: Negative.  Negative for  congestion, sinus pressure and sore throat.    Eyes: Negative.  Negative for photophobia, discharge and visual disturbance.   Respiratory: Negative.  Negative for cough, chest tightness, shortness of breath and wheezing.    Cardiovascular: Negative.  Negative for chest pain and palpitations.   Gastrointestinal: Negative.  Negative for abdominal pain, blood in stool, constipation, diarrhea, nausea and vomiting.   Endocrine: Negative.    Genitourinary: Negative.    Musculoskeletal: Positive for myalgias. Negative for neck pain and neck stiffness.   Skin: Positive for wound.   Allergic/Immunologic: Negative.    Neurological: Positive for weakness. Negative for seizures, syncope and headaches.   Hematological: Negative.    Psychiatric/Behavioral: Negative.  Negative for agitation, behavioral problems, hallucinations, self-injury and suicidal ideas.     Objective:     Vital Signs (Most Recent):  Temp: 97 °F (36.1 °C) (09/23/18 0739)  Pulse: 62 (09/23/18 0757)  Resp: 16 (09/23/18 0757)  BP: 135/79 (09/23/18 0739)  SpO2: (!) 94 % (09/23/18 0757) Vital Signs (24h Range):  Temp:  [96.2 °F (35.7 °C)-98.5 °F (36.9 °C)] 97 °F (36.1 °C)  Pulse:  [59-83] 62  Resp:  [16-18] 16  SpO2:  [91 %-98 %] 94 %  BP: (123-207)/(76-97) 135/79     Weight: 93 kg (205 lb 0.4 oz)  Body mass index is 28.6 kg/m².    Intake/Output Summary (Last 24 hours) at 9/23/2018 0945  Last data filed at 9/23/2018 0500  Gross per 24 hour   Intake 1710 ml   Output 2600 ml   Net -890 ml      Physical Exam   Constitutional: He is oriented to person, place, and time. He appears well-developed and well-nourished.   HENT:   Head: Normocephalic and atraumatic.   Eyes: EOM are normal. Pupils are equal, round, and reactive to light.   Neck: Normal range of motion. Neck supple.   Cardiovascular: Normal rate, regular rhythm and intact distal pulses.   Murmur heard.   Systolic murmur is present with a grade of 3/6.  Pulmonary/Chest: Effort normal and breath sounds normal.  No respiratory distress. He has no wheezes.   Abdominal: Soft. Bowel sounds are normal.   Musculoskeletal: Normal range of motion. He exhibits no deformity.        Legs:  Feet:   Left Foot: amputated  Neurological: He is alert and oriented to person, place, and time. He has normal reflexes.   Skin: Skin is warm and dry. Capillary refill takes 2 to 3 seconds.   Psychiatric: He has a normal mood and affect. His behavior is normal. Judgment and thought content normal.   Nursing note and vitals reviewed.      Significant Labs:   Blood Culture:   Recent Labs   Lab  09/21/18   1516   LABBLOO  No Growth to date  No Growth to date     BMP:   Recent Labs   Lab  09/23/18   0553   GLU  151*   NA  138   K  3.6   CL  101   CO2  25   BUN  25*   CREATININE  1.5*   CALCIUM  9.1     CBC:   Recent Labs   Lab  09/22/18   0521 09/23/18   0553   WBC  12.39  8.75   HGB  12.3*  12.7*   HCT  38.0*  39.3*   PLT  283  292     CMP:   Recent Labs   Lab  09/22/18   0521 09/23/18   0553   NA  132*  138   K  4.1  3.6   CL  96  101   CO2  22*  25   GLU  382*  151*   BUN  28*  25*   CREATININE  1.8*  1.5*   CALCIUM  9.0  9.1   ANIONGAP  14  12   EGFRNONAA  39*  48*     All pertinent labs within the past 24 hours have been reviewed.    Significant Imaging: I have reviewed all pertinent imaging results/findings within the past 24 hours.    Assessment/Plan:      * Gas gangrene of foot    Vanc/zosyn  Podiatry consult  Source of sepsis    --TMA scheduled for 9/21  --continue IV vanc, dc IV Zosyn  --BC + staph aureus  --ID consulted  --repeat BC drawn 9/21 9/22  ABX  Podiatry  S/p TMA  ID  Plan for Home Support - PICC / WC / [facility]  CM on Consult    9/23  ABX  Podiatry for wound closure tomorrow  ID on Consult  CM on Consult        Gangrene of left foot              Bacteremia    --BC grew STAPHYLOCOCCUS AUREUS, sensitivities pending   --continue IV Vancomycin  --ID consulted  --repeat bc drawn 9/21 9/22  ABX  ID  Monitor  PICC  Home IV abx  infusion      ABX  ID on Consult  PICC         Osteomyelitis of left foot    Vanc/zosyn  poditary consult   --TMA schedueled   Plan for wound closure on Monday  Podiatry  Plan for post acute care  Patient declining SNF placement  CM on consult      ABX  Wound Closure  ID on Consult  CM for Home needs          Failure to thrive in adult    --registered dietitian following      Diet  Monitor        Nutrition  Refusing Post Acute Services - SNF  Will get H/H [facility]            Diabetes mellitus type 2, uncontrolled, with complications    Hgb A1c 8.0  --accuchecks and SSI  --Levemir 15U qd      Increase Levemir to 25 units      Improved control  Levemir to 28            Sepsis    Not severe sepsis. Patients kidney function baseline with kidney transplant and troponin elevation related to CKD.   Continue IV antx therapy with vanc and zosyn for suspected source of foot wound  --BC grew STAPHYLOCOCCUS AUREUS, sensitivities pending   --continue IV Vancomycin, DC IV Zosyn  --ID consulted  --repeat bc drawn   Monitor labs      Monitor BC  PICC  IV abx x 2 weeks  ID  [facility]      ABX  ID  Plan for possible PICC        PVD (peripheral vascular disease)    --vascular and podiatry following  --TMA today ()  --continue daily  ASA      NPO after MN for wound closure tomorrow per podiatry          Type 2 diabetes mellitus with retinopathy, with long-term current use of insulin      Increase Levemir  NISS  Accuchecks      Improved  NISS  Accuchecks          Chronic kidney disease (CKD), stage III (moderate)    Dr. Estrada with renal consulted due to patient with transplant.  He reports patient can stay here  Renal consulted   Monitor rfp      IVF's  Nephrology       IVF's  Nephrology  Improved           donor kidney transplant for DM 14    --Nephrology following  --see above        Essential hypertension    Hold bp meds at this time due to sepsis    Monitor trends and restart once need    -sepsis status improving.  -Restart BP meds.  -Monitor VS    9/22  CCB  Monitor    9/23  Stable  Monitor          VTE Risk Mitigation (From admission, onward)        Ordered     Place GOPI hose  Until discontinued      09/18/18 2020     Place sequential compression device  Until discontinued      09/18/18 2020     IP VTE HIGH RISK PATIENT  Once      09/18/18 2020              Chapin Proctor MD  Department of Hospital Medicine   [facility]

## 2018-09-23 NOTE — ASSESSMENT & PLAN NOTE
Vanc/zosyn  Podiatry consult  Source of sepsis    --TMA scheduled for 9/21  --continue IV vanc, dc IV Zosyn  --BC + staph aureus  --ID consulted  --repeat BC drawn 9/21 9/22  ABX  Podiatry  S/p TMA  ID  Plan for Home Support - PICC / WC / [facility]  CM on Consult    9/23  ABX  Podiatry for wound closure tomorrow  ID on Consult  CM on Consult

## 2018-09-23 NOTE — PLAN OF CARE
Problem: Fall Risk (Adult)  Goal: Absence of Falls  Patient will demonstrate the desired outcomes by discharge/transition of care.  No falls

## 2018-09-23 NOTE — ASSESSMENT & PLAN NOTE
S/p TMA -will need therapy for 2 weeks for MSSA bacteremia   Will continue IV Zosyn  .  He had interval TMA.

## 2018-09-23 NOTE — ASSESSMENT & PLAN NOTE
Not severe sepsis. Patients kidney function baseline with kidney transplant and troponin elevation related to CKD.   Continue IV antx therapy with vanc and zosyn for suspected source of foot wound  --BC grew STAPHYLOCOCCUS AUREUS, sensitivities pending   --continue IV Vancomycin, DC IV Zosyn  --ID consulted  --repeat bc drawn 9/21  Monitor labs    9/22  Monitor BC  PICC  IV abx x 2 weeks  ID  [facility]    9/23  ABX  ID  Plan for possible PICC

## 2018-09-23 NOTE — PROGRESS NOTES
[facility]  Infectious Disease  Progress Note    Patient Name: Lavelle Ladd  MRN: [id number]  Admission Date: 9/18/2018  Length of Stay: 5 days  Attending Physician: Chapin Proctor MD  Primary Care Provider: Rishabh Esteban MD    Isolation Status: No active isolations  Assessment/Plan:      Osteomyelitis of left foot    S/p TMA -will need therapy for 2 weeks for MSSA bacteremia   Will continue IV Zosyn  .  He had interval TMA.          Type 2 diabetes mellitus with retinopathy, with long-term current use of insulin    Continue insulin sliding scale , diabetic diet         Chronic kidney disease (CKD), stage III (moderate)    Will continue close monitoring of serum creatinine .  Avoid nephrotoxic meds.        Essential hypertension    Will continue Norvasc             Anticipated Disposition:     Thank you for your consult. I will follow-up with patient. Please contact us if you have any additional questions.    Ronny Veliz MD  Infectious Disease  [facility]    Subjective:     Principal Problem:Gas gangrene of foot    HPI:  63 year old man s/p  kidney transplant patient with hx of HTN, CAD, diastolic heart failure, Type 2 DM with diabetic neuropathy, ESRD, PVD, COPD who was brought to ED via EMS secondary to failure to thrive .He was noted to be covered in human and dog feces when he seen by EMS  Since admission, imaging test showed 1.  Progressive erosive changes with bone loss involving the 1st distal phalanx concerning for osteomyelitis.2.  Air bubbles within the left 5th toe, concerning for possible infectious process or gangrene.3.  Progressive soft tissue loss involving the 4th toe, which could also indicate gangrene.    Since admission, he was seen by podiatry and had  open TMA left lower extremity with gangrene and purulent drainage noted intraoperatively..  Blood cultures are positive for MSSA.    Interval History:  63 year old man with history of     Review of Systems    Constitutional: Negative for chills, diaphoresis, fatigue and fever.   HENT: Negative for congestion, postnasal drip, rhinorrhea and sore throat.    Eyes: Negative for pain and visual disturbance.   Respiratory: Negative for cough, shortness of breath and wheezing.    Cardiovascular: Negative for chest pain, palpitations and leg swelling.   Gastrointestinal: Negative for abdominal distention, abdominal pain, constipation, diarrhea, nausea and vomiting.   Endocrine: Negative for cold intolerance and heat intolerance.   Genitourinary: Negative for difficulty urinating, dysuria and hematuria.   Musculoskeletal: Positive for back pain.   Skin: Positive for wound.   Allergic/Immunologic: Positive for immunocompromised state.   Neurological: Negative for dizziness, syncope, speech difficulty, weakness, light-headedness and headaches.   Hematological: Negative.    Psychiatric/Behavioral: Negative for agitation, behavioral problems and confusion.     Objective:     Vital Signs (Most Recent):  Temp: 97 °F (36.1 °C) (09/23/18 0739)  Pulse: 63 (09/23/18 0739)  Resp: 16 (09/23/18 0739)  BP: 135/79 (09/23/18 0739)  SpO2: 98 % (09/23/18 0739) Vital Signs (24h Range):  Temp:  [96.2 °F (35.7 °C)-98.5 °F (36.9 °C)] 97 °F (36.1 °C)  Pulse:  [59-83] 63  Resp:  [16-18] 16  SpO2:  [91 %-98 %] 98 %  BP: (123-207)/(76-97) 135/79     Weight: 93 kg (205 lb 0.4 oz)  Body mass index is 28.6 kg/m².    Estimated Creatinine Clearance: 57.2 mL/min (A) (based on SCr of 1.5 mg/dL (H)).    Physical Exam   Constitutional: He is oriented to person, place, and time. He appears well-developed. He is cooperative.   HENT:   Head: Normocephalic.   Nose: No rhinorrhea.   Mouth/Throat: Mucous membranes are normal. No oropharyngeal exudate.   Eyes: Pupils are equal, round, and reactive to light.   Neck: No thyroid mass present.   Cardiovascular: Normal rate, regular rhythm, S1 normal, S2 normal and intact distal pulses.   Pulmonary/Chest: Effort normal. No  respiratory distress. He has no wheezes.   Abdominal: Soft. Bowel sounds are normal. He exhibits no distension. There is no tenderness. No hernia.   Musculoskeletal: Tenderness: lastpt.   S/p left TMA.    S/p right BKA.   Lymphadenopathy:     He has no cervical adenopathy.   Neurological: He is alert and oriented to person, place, and time.   Skin: Skin is warm and dry.   Psychiatric: He has a normal mood and affect.   Nursing note and vitals reviewed.      Significant Labs:   Blood Culture:   Recent Labs   Lab  07/20/18   1415  09/18/18   1800  09/18/18   1812  09/21/18   0907  09/21/18   1516   LABBLOO  No growth after 5 days.  Gram stain susan bottle: Gram positive cocci in clusters resembling Staph   Results called to and read back by:NOA Eckert RN 09/19/2018  15:30  Gram stain aer bottle: Gram positive cocci in clusters resembling Staph   09/19/2018  15:56  STAPHYLOCOCCUS AUREUS  ID consult required at [facility].    Gram stain aer bottle: Gram positive cocci in clusters resembling Staph   Gram stain susan bottle: Gram positive cocci in clusters resembling Staph   Results called to and read back by:NOA Eckert RN 09/19/2018  15:31  STAPHYLOCOCCUS AUREUS  ID consult required at [facility].  For susceptibility see order # [id number]    No Growth to date  No Growth to date  No Growth to date  No Growth to date     BMP:   Recent Labs   Lab  09/23/18   0553   GLU  151*   NA  138   K  3.6   CL  101   CO2  25   BUN  25*   CREATININE  1.5*   CALCIUM  9.1     CBC:   Recent Labs   Lab  09/22/18   0521  09/23/18   0553   WBC  12.39  8.75   HGB  12.3*  12.7*   HCT  38.0*  39.3*   PLT  283  292     All pertinent labs within the past 24 hours have been reviewed.    Significant Imaging: I have reviewed all pertinent imaging results/findings within the past 24 hours.

## 2018-09-23 NOTE — PROGRESS NOTES
Subjective:     Patient ID: Lavelle Ladd is a 63 y.o. male.    Chief Complaint: Failure To Thrive (pts room was covered in human and dog feces, pt refusing to take his medications, diabetic, glucose 348 by EMS)    HPI: This 63 year old male returns to the clinic 2 days status post left open TMA procedure. Patient has no complaints of fever chills or sweats. Positive improved pain. Patient states dressing was kept dry, clean, and intact.         Patient Active Problem List   Diagnosis    Renal hypertension    GERD (gastroesophageal reflux disease)    Peptic ulcer disease    Malignant HTN with heart disease, w/o CHF, with chronic kidney disease    Acidosis    Essential hypertension    Acute diastolic heart failure    Gastroparesis     donor kidney transplant for DM 16    Prophylactic immunotherapy    Adverse effect of glucocorticoids and synthetic analogues, sequela    Osteoarthritis of lumbar spine    Osteoarthritis of thoracic spine with myelopathy    Type 2 diabetes mellitus with hyperglycemia, with long-term current use of insulin    Coronary artery disease involving native coronary artery of native heart without angina pectoris    Hyperlipidemia    Chronic obstructive pulmonary disease    Ulnar neuropathy    Chronic kidney disease (CKD), stage III (moderate)    Reactive airway disease    Kidney transplant rejection    Type 2 diabetes mellitus with retinopathy, with long-term current use of insulin    Type 2 diabetes mellitus with diabetic neuropathy, with long-term current use of insulin    H/O amputation    Cavitary lesion of lung    Opacity of lung on imaging study    Bacteremia due to Gram-negative bacteria    ESBL (extended spectrum beta-lactamase) producing bacteria infection    History of hepatitis C, s/p successful Rx w/ SVR12 - 2017    Kidney transplant recipient    Intractable vomiting with nausea    PVD (peripheral vascular disease)    Chronic  bilateral low back pain with left-sided sciatica    Diabetic polyneuropathy    Other chest pain    Disc disease, degenerative, lumbar or lumbosacral    Numbness and tingling    Adjustment insomnia    Lumbar stenosis with neurogenic claudication    Open wound of left great toe    Gangrene    Acute lumbar radiculopathy    Chronic lumbar radiculopathy    Gangrene of toe of left foot    Long term current use of immunosuppressive drug    Peripheral vascular disease    Diabetic foot infection    Cellulitis    Sepsis    Diabetes mellitus type 2, uncontrolled, with complications    Gas gangrene of foot    Failure to thrive in adult    Osteomyelitis of left foot    Bacteremia    Gangrene of left foot          Medication List      ASK your doctor about these medications    amLODIPine 2.5 MG tablet  Commonly known as:  NORVASC  Take 1 tablet (2.5 mg total) by mouth once daily.     aspirin 81 MG EC tablet  Commonly known as:  ECOTRIN  Take 1 tablet (81 mg total) by mouth once daily.     BD INSULIN SYRINGE ULTRA-FINE 1 mL 31 gauge x 5/16 Syrg  Generic drug:  insulin syringe-needle U-100  USE ONE AS DIRECTED FOUR TIMES DAILY WITH MEALS AND AT BEDTIME     blood sugar diagnostic Strp  1 each by Misc.(Non-Drug; Combo Route) route 3 (three) times daily.     blood-glucose meter kit  Use as instructed     budesonide-formoterol 160-4.5 mcg 160-4.5 mcg/actuation Hfaa  Commonly known as:  SYMBICORT  Inhale 2 puffs into the lungs every 12 (twelve) hours. Wash out mouth after using     ergocalciferol 50,000 unit Cap  Commonly known as:  ERGOCALCIFEROL  Take 1 capsule (50,000 Units total) by mouth every 7 days. Take on Mondays     gabapentin 300 MG capsule  Commonly known as:  NEURONTIN     HYDROcodone-acetaminophen  mg per tablet  Commonly known as:  NORCO  Take 1 tablet by mouth every 6 (six) hours as needed.     inhalation spacing device  Commonly known as:  BREATHERITE VALVED MDI CHAMBER  Use as directed for  "inhalation.     insulin  unit/mL injection  Commonly known as:  NovoLIN N NPH U-100 Insulin  Take 25 units subq with breakfast and 15 units at bedtime     lancets Misc  1 each by Misc.(Non-Drug; Combo Route) route 3 (three) times daily.     NovoLIN R Regular U-100 Insuln 100 unit/mL injection  Generic drug:  insulin regular  INJECT 6 UNITS WITH BREAKFAST AND 8 UNITS WITH DINNER, SUBCUTANEOUSLY 30 MIN BEFORE MEAL.     ondansetron 4 MG Tbdl  Commonly known as:  ZOFRAN-ODT  Take 1 tablet (4 mg total) by mouth every 8 (eight) hours as needed.     * pen needle, diabetic 32 gauge x 5/32" Ndle  Commonly known as:  EASY COMFORT PEN NEEDLES  Inject 1 each into the skin 3 (three) times daily.     * pen needle, diabetic 31 gauge x 1/4" Ndle  1 each by Misc.(Non-Drug; Combo Route) route 4 (four) times daily.     * BD ULTRA-FINE SHORT PEN NEEDLE 31 gauge x 5/16" Ndle  Generic drug:  pen needle, diabetic     predniSONE 5 MG tablet  Commonly known as:  DELTASONE  Take 1 tablet (5 mg total) by mouth once daily.     sodium bicarbonate 650 MG tablet  Take 4 tablets (2,600 mg total) by mouth 2 (two) times daily.     tacrolimus 0.5 MG Cap  Commonly known as:  PROGRAF  Take 3 capsules (1.5 mg total) by mouth every 12 (twelve) hours. Z94.0 Kidney Transplant     zolpidem 5 MG Tab  Commonly known as:  AMBIEN  Take 2 tablets (10 mg total) by mouth nightly as needed.         * This list has 3 medication(s) that are the same as other medications prescribed for you. Read the directions carefully, and ask your doctor or other care provider to review them with you.                Review of patient's allergies indicates:  No Known Allergies    Past Surgical History:   Procedure Laterality Date    AORTOGRAPHY WITH SERIALOGRAPHY N/A 6/14/2018    Procedure: LEFT LEG ANGIOGRAM;  Surgeon: Donal Mcdonald MD;  Location: [facility] CATH LAB;  Service: Vascular;  Laterality: N/A;    av bovine graft      Left UE    AV FISTULA PLACEMENT      left UE    " BIOPSY Tranplanted Kidney N/A 8/15/2016    Performed by Paulette Hanna MD at [facility] OR 2ND FLR    BIOPSY, LIVER, TRANSJUGULAR APPROACH N/A 2014    Performed by [facility] at [facility] C[facility] LAB    CARDIAC CATHETERIZATION  2015    INSERTION, CATHETER, VASCULAR N/A 10/31/2013    Performed by Ralph Mckeon MD at [facility] CATH LAB    IRRIGATION AND DEBRIDEMENT Left 2018    Performed by Katerina Magaña DPM at [facility] OR    KIDNEY TRANSPLANT  2016    LEFT LEG ANGIOGRAM N/A 2018    Performed by Donal Mcdonald MD at [facility] CATH LAB    LEG AMPUTATION THROUGH KNEE      right LE, started as nail puncture leading to diabetic ulcer    TRANSPLANT-KIDNEY Right 2016    Performed by Ronny Bergeron MD at [facility] OR 2ND FLR       Family History   Problem Relation Age of Onset    Cancer Father     Diabetes Father     Heart failure Father     Stroke Father     Heart failure Mother     Kidney disease Neg Hx        Social History     Socioeconomic History    Marital status: Single     Spouse name: Not on file    Number of children: Not on file    Years of education: Not on file    Highest education level: Not on file   Social Needs    Financial resource strain: Not on file    Food insecurity - worry: Not on file    Food insecurity - inability: Not on file    Transportation needs - medical: Not on file    Transportation needs - non-medical: Not on file   Occupational History    Occupation: Disabled     Employer: DISABLED   Tobacco Use    Smoking status: Former Smoker     Packs/day: 1.00     Years: 40.00     Pack years: 40.00     Last attempt to quit: 2013     Years since quittin.7    Smokeless tobacco: Never Used   Substance and Sexual Activity    Alcohol use: Yes     Alcohol/week: 3.6 oz     Types: 6 Cans of beer per week     Comment: 6 beers/week    Drug use: No    Sexual activity: No   Other Topics Concern    Not on file   Social History Narrative    Lives alone.  Retired from construction and various jobs, now retired. Would like to return to work PT to alleviate boredom.       Vitals:    09/23/18 0441 09/23/18 0500 09/23/18 0739 09/23/18 0757   BP: 123/79  135/79    Pulse: 66 (!) 59 63 62   Resp: 18  16 16   Temp: 98.5 °F (36.9 °C)  97 °F (36.1 °C)    TempSrc:   Oral    SpO2: 95%  98% (!) 94%   Weight:       Height:       PainSc:           Hemoglobin A1C   Date Value Ref Range Status   09/19/2018 8.0 (H) 4.0 - 5.6 % Final     Comment:     ADA Screening Guidelines:  5.7-6.4%  Consistent with prediabetes  >or=6.5%  Consistent with diabetes  High levels of fetal hemoglobin interfere with the HbA1C  assay. Heterozygous hemoglobin variants (HbS, HgC, etc)do  not significantly interfere with this assay.   However, presence of multiple variants may affect accuracy.     07/07/2018 10.2 (H) 4.0 - 5.6 % Final     Comment:     ADA Screening Guidelines:  5.7-6.4%  Consistent with prediabetes  >or=6.5%  Consistent with diabetes  High levels of fetal hemoglobin interfere with the HbA1C  assay. Heterozygous hemoglobin variants (HbS, HgC, etc)do  not significantly interfere with this assay.   However, presence of multiple variants may affect accuracy.     06/12/2018 9.9 (H) 4.0 - 5.6 % Final     Comment:     ADA Screening Guidelines:  5.7-6.4%  Consistent with prediabetes  >or=6.5%  Consistent with diabetes  High levels of fetal hemoglobin interfere with the HbA1C  assay. Heterozygous hemoglobin variants (HbS, HgC, etc)do  not significantly interfere with this assay.   However, presence of multiple variants may affect accuracy.         Review of Systems   Constitutional: Negative for chills and fever.   Respiratory: Negative for shortness of breath.    Cardiovascular: Negative for chest pain, palpitations, orthopnea, claudication and leg swelling.   Gastrointestinal: Negative for diarrhea, nausea and vomiting.   Musculoskeletal: Negative for joint pain.   Skin: Negative for rash.    Neurological: Positive for sensory change. Negative for dizziness, tingling, focal weakness and weakness.   Psychiatric/Behavioral: Negative.              Objective:      Physical examination: General: Patient is in no acute distress, alert and oriented x 3.  Dressing to left foot clean and intact. Blood strike through noted to dressing.   Lower Extremity Exam:  Vascular: Dorsalis pedis and Posterior tibial pulses palpable on left foot. Mild edema noted on left foot.   Dermatologic: Sutures Intact. Open TMA site clean with granular viable tissue and wound margins. No purulent drainage, malodor, or increased temp noted to surgical site.   Neurological: Light touch sensation intact to left foot.   Musculoskeletal: Negative pain on palpation/ROM of left foot.                           Assessment:       Dehydration    Flank pain  -     EKG 12-lead; Standing    Failure to thrive in adult  -     X-Ray Chest 1 View; Standing    Gangrene of left foot  -     X-Ray Foot Complete Left; Standing  -     Case Request Operating Room: AMPUTATION, TOE, AMPUTATION, FOOT, TRANSMETATARSAL; Standing  -     US Lower Extremity Arteries Left; Standing  -     US Ankle Brachial Indices Ext LTD WO Str; Standing  -     Case Request Operating Room: AMPUTATION, FOOT, TRANSMETATARSAL; Standing  -     Case Request Operating Room: Layered Extremity Wound Closure, Left   ; Standing    Hyperglycemia    Sepsis  -     Inpatient consult to Vascular Surgery; Standing  -     Cancel: Verify surgical site; Standing  -     Cancel: Verify informed consent; Standing  -     Cancel: Void on call to OR; Standing  -     Cancel: Notify physician ; Standing  -     Cancel: Diet NPO; Standing  -     Cancel: Consult to Anesthesiology; Standing    PVD (peripheral vascular disease)  -     Inpatient consult to Vascular Surgery; Standing  -     Cancel: Verify surgical site; Standing  -     Cancel: Verify informed consent; Standing  -     Cancel: Void on call to OR;  Standing  -     Cancel: Notify physician ; Standing  -     Cancel: Diet NPO; Standing  -     Cancel: Consult to Anesthesiology; Standing  -     US Lower Extremity Arteries Left; Standing  -     US Ankle Brachial Indices Ext LTD WO Str; Standing    Gas gangrene of foot  -     Inpatient consult to Vascular Surgery; Standing  -     Cancel: Verify surgical site; Standing  -     Cancel: Verify informed consent; Standing  -     Cancel: Void on call to OR; Standing  -     Cancel: Notify physician ; Standing  -     Cancel: Diet NPO; Standing  -     Cancel: Consult to Anesthesiology; Standing  -     Cancel: Diet NPO; Standing  -     Vital signs; Standing  -     POCT glucose; Standing  -     Verify surgical site; Standing  -     Verify informed consent; Standing  -     Void on call to OR; Standing  -     Cancel: Diet NPO Except for: Sips with Medication; Standing  -     Consult to Anesthesiology; Standing  -     Vital signs; Standing  -     Verify surgical site; Standing  -     Verify informed consent; Standing  -     Void on call to OR; Standing  -     Notify physician ; Standing  -     Notify PCP of Admission ; Standing  -     Diet NPO Except for: Sips with Medication; Standing  -     Consult to Anesthesiology; Standing    Type 2 diabetes mellitus with hyperglycemia, with long-term current use of insulin  -     Cancel: Diet diabetic [facility]; 2000 Calorie; Standing    Bacteremia  -     2D echo with color flow doppler; Standing    Gangrene  -     Transfer patient; Standing  -     Notify Physician - Potential Need of Opioid Reversal; Standing  -     Cancel: Pulse Oximetry Q4H; Standing  -     Transfer patient; Standing  -     X-Ray Foot 2 View Left; Standing  -     Advance diet as tolerated; Standing  -     Change dressing; Standing    Gangrene of toe of left foot  -     Transfer patient; Standing  -     Notify Physician - Potential Need of Opioid Reversal; Standing  -     Cancel: Pulse Oximetry Q4H; Standing  -      Transfer patient; Standing  -     X-Ray Foot 2 View Left; Standing  -     Advance diet as tolerated; Standing  -     Change dressing; Standing    Other orders  -     Insert peripheral IV; Standing  -     sodium chloride 0.9% bolus 1,000 mL; Inject 1,000 mLs into the vein ED 1 Time.  -     CBC auto differential; Standing  -     Urinalysis; Standing  -     TSH; Standing  -     Cardiac Monitoring - Adult; Standing  -     CK; Standing  -     CK-MB; Standing  -     Troponin I; Standing  -     CT Renal Stone Study ABD Pelvis WO; Standing  -     Brain natriuretic peptide; Standing  -     Comprehensive metabolic panel; Standing  -     morphine injection 4 mg; Inject 1 mL (4 mg total) into the vein ED 1 Time.  -     ondansetron injection 4 mg; Inject 4 mg into the vein ED 1 Time.  -     0.9%  NaCl infusion; Inject 1,000 mLs into the vein ED 1 Time.  -     Straight cath if unable to void; Standing  -     T4, free; Standing  -     Urinalysis Microscopic; Standing  -     Blood Culture #1 **CANNOT BE ORDERED STAT**; Standing  -     Blood Culture #2 **CANNOT BE ORDERED STAT**; Standing  -     Lactic acid, plasma; Standing  -     Cancel: Pharmacy to dose Vancomycin consult; Standing  -     Discontinue: piperacillin-tazobactam 4.5 g in dextrose 5 % 100 mL IVPB (ready to mix system); Inject 100 mLs (4.5 g total) into the vein every 8 (eight) hours.  -     Discontinue: vancomycin (VANCOCIN) 1,250 mg in dextrose 5 % 250 mL IVPB; Inject 1,250 mg into the vein every 48 hours.  -     Cancel: Vancomycin, random; Standing  -     Inpatient consult to Hospitalist; Standing  -     Discontinue: amLODIPine tablet 2.5 mg; Take 1 tablet (2.5 mg total) by mouth once daily.  -     aspirin EC tablet 81 mg; Take 1 tablet (81 mg total) by mouth once daily.  -     gabapentin capsule 100 mg; Take 1 capsule (100 mg total) by mouth 3 (three) times daily.  -     predniSONE tablet 5 mg; Take 1 tablet (5 mg total) by mouth once daily.  -     sodium  bicarbonate tablet 2,600 mg; Take 4 tablets (2,600 mg total) by mouth 2 (two) times daily.  -     Vital Signs; Standing  -     Cardiac Monitoring - Adult; Standing  -     Progressive Mobility Protocol (mobilize patient to their highest level of functioning at least twice daily); Standing  -     Fall precautions; Standing  -     Aspiration precautions; Standing  -     Weigh patient; Standing  -     Strict intake and output (1) Document % meals taken (2) # of urinations (3) # and consistency of BMs; Standing  -     Neuro checks; Standing  -     Notify Physician; Standing  -     sodium chloride 0.9% flush 5 mL; Inject 5 mLs into the vein as needed for Line Care.  -     Insert saline lock; Standing  -     acetaminophen tablet 650 mg; Take 2 tablets (650 mg total) by mouth every 4 (four) hours as needed for mild pain 1-3/10 pain scale or Headaches.  -     ondansetron injection 4 mg; Inject 4 mg into the vein every 8 (eight) hours as needed for Nausea/Vomiting (1st choice) - use as first treatment.  -     insulin aspart U-100 pen 0-5 Units; Inject 0-5 Units into the skin before meals and at bedtime as needed for For blood glucose (Hyperglycemia).  -     glucose chewable tablet 16 g; Take 4 tablets (16 g total) by mouth as needed for Blood Glucose less than (70 mg/dL X 1 dose for patients who can take PO.).  -     glucose chewable tablet 24 g; Take 6 tablets (24 g total) by mouth as needed for Blood Glucose less than (50 mg/dL X 1 dose for patients who can take PO.).  -     dextrose 50% injection 12.5 g; Inject 25 mLs (12.5 g total) into the vein as needed for For blood glucose (between 51 - 70 mg/dL if patient cannnot take PO but has IV access.).  -     dextrose 50% injection 25 g; Inject 50 mLs (25 g total) into the vein as needed for For blood glucose (less than 50 mg/dL if patient cannnot take PO but has IV access.).  -     glucagon (human recombinant) injection 1 mg; Inject 1 mg into the muscle as needed for Blood  Glucose less than (70 mg/dL if patient is unconscious or obtunded and DOES NOT HAVE IV ACCESS.).  -     Recheck Blood Glucose:; Standing  -     TSH; Standing  -     Basic Metabolic Panel (BMP); Standing  -     Magnesium; Standing  -     Phosphorus; Standing  -     Lipid panel; Standing  -     Hemoglobin A1c; Standing  -     CBC with Automated Differential; Standing  -     PT/INR; Standing  -     PTT; Standing  -     Urinalysis; Standing  -     Pulse Oximetry Q4H; Standing  -     Full code; Standing  -     Admit to Inpatient; Standing  -     Turn patient; Standing  -     Cancel: Diet NPO; Standing  -     IP VTE HIGH RISK PATIENT; Standing  -     Discontinue: heparin (porcine) injection 5,000 Units; Inject 1 mL (5,000 Units total) into the skin every 8 (eight) hours.  -     Place GOPI hose; Standing  -     Place sequential compression device; Standing  -     Turn cough deep breathe; Standing  -     Incentive spirometry; Standing  -     POCT glucose; Standing  -     tacrolimus capsule 1.5 mg; Take 1.5 mg by mouth 0800, 1800.  -     arformoterol nebulizer solution 15 mcg; Take 2 mLs (15 mcg total) by nebulization every 12 (twelve) hours.  -     Inhalation Treatment BID; Standing  -     budesonide nebulizer solution 0.5 mg; Take 2 mLs (0.5 mg total) by nebulization every 12 (twelve) hours.  -     Oxygen Continuous; Standing  -     Tacrolimus level; Standing  -     Inpatient consult to Nephrology; Standing  -     Phosphorus; Standing  -     Magnesium; Standing  -     morphine injection 2 mg; Inject 0.5 mLs (2 mg total) into the vein once.  -     Discontinue: traMADol tablet 50 mg; Take 1 tablet (50 mg total) by mouth every 8 (eight) hours as needed for moderate pain 4-6/10 pain scale or severe pain 7-10/10 pain scale.  -     Vancomycin, random; Standing  -     POCT glucose; Standing  -     olanzapine zydis disintegrating tablet 5 mg; Take 1 tablet (5 mg total) by mouth every evening.  -     Inpatient consult to Wound Care;  Standing  -     POCT glucose; Standing  -     pantoprazole EC tablet 40 mg; Take 1 tablet (40 mg total) by mouth once daily.  -     Inpatient consult to Podiatry; Standing  -     Inpatient consult to Registered Dietitian/Nutritionist; Standing  -     Inpatient consult to Social Work; Standing  -     PT evaluate and treat; Standing  -     OT evaluate and treat; Standing  -     T4, free; Standing  -     vancomycin (VANCOCIN) 1,250 mg in dextrose 5 % 250 mL IVPB; Inject 1,250 mg into the vein once.  -     Discontinue: vancomycin in dextrose 5 % 1 gram/250 mL PLACEHOLDER DOSE; Inject 250 mLs (1,000 mg total) into the vein every 48 hours.  -     Vancomycin, random; Standing  -     POCT glucose; Standing  -     POCT glucose; Standing  -     POCT glucose; Standing  -     CARDIAC MONITORING STRIPS  -     EKG 12-LEAD  -     Cancel: Diet diabetic [facility]; 1500 Calorie; Standing  -     Nursing communication; Standing  -     POCT glucose; Standing  -     POCT glucose; Standing  -     Discontinue: hydrALAZINE injection 10 mg; Inject 0.5 mLs (10 mg total) into the vein every 8 (eight) hours as needed for SBP greater than (180).  -     POCT glucose; Standing  -     Cancel: Diet NPO Except for: Sips with Medication; Standing  -     vancomycin (VANCOCIN) 1,250 mg in dextrose 5 % 250 mL IVPB; Inject 1,250 mg into the vein once.  -     Discontinue: vancomycin (VANCOCIN) 1,250 mg in dextrose 5 % 250 mL IVPB; Inject 1,250 mg into the vein every 18 (eighteen) hours.  -     Cancel: VANCOMYCIN, TROUGH before 3rd dose; Standing  -     POCT glucose; Standing  -     Cancel: Diet Cardiac; Standing  -     Discontinue: HYDROcodone-acetaminophen  mg per tablet 1 tablet; Take 1 tablet by mouth 3 (three) times daily as needed for moderate pain 4-6/10 pain scale.  -     POCT glucose; Standing  -     Discontinue: insulin detemir U-100 pen 10 Units; Inject 10 Units into the skin every evening.  -     HYDROcodone-acetaminophen  mg  per tablet 1 tablet; Take 1 tablet by mouth every 4 (four) hours as needed for moderate pain 4-6/10 pain scale.  -     POCT glucose; Standing  -     POCT glucose; Standing  -     traZODone tablet 50 mg; Take 1 tablet (50 mg total) by mouth nightly as needed for Insomnia.  -     Inpatient consult to Infectious Diseases; Standing  -     Blood culture; Standing  -     Blood culture; Standing  -     Nursing communication; Standing  -     nozaseptin (NOZIN) nasal ; by Each Nare route 2 (two) times daily.  -     morphine injection 2 mg; Inject 0.5 mLs (2 mg total) into the vein once.  -     insulin regular injection 6 Units; Inject 6 Units into the vein once.  -     POCT glucose; Standing  -     Discontinue: bupivacaine (PF) injection; as needed.  -     Culture, Anaerobe; Standing  -     Aerobic culture; Standing  -     Gram stain; Standing  -     Specimen to Pathology - Surgery; Standing  -     Specimen to Pathology - Surgery; Standing  -     IP VTE HIGH RISK PATIENT; Standing  -     Specimen to Pathology - Surgery; Standing  -     POCT glucose; Standing  -     Diet Cardiac; Standing  -     POCT glucose; Standing  -     piperacillin-tazobactam 4.5 g in dextrose 5 % 100 mL IVPB (ready to mix system); Inject 100 mLs (4.5 g total) into the vein every 8 (eight) hours.  -     POCT glucose; Standing  -     POCT glucose; Standing  -     Discontinue: insulin detemir U-100 pen 20 Units; Inject 20 Units into the skin every evening.  -     Phosphorus; Standing  -     POCT glucose; Standing  -     Inpatient consult to Social Work/Case Management; Standing  -     Inpatient consult to Social Work/Case Management; Standing  -     Discontinue: insulin detemir U-100 pen 25 Units; Inject 25 Units into the skin every evening.  -     POCT glucose; Standing  -     morphine injection 2 mg; Inject 1 mL (2 mg total) into the vein once.  -     Vital signs; Standing  -     Insert peripheral IV; Standing  -     Use 1% lidocaine at IV  site; Standing  -     Notify Physician - Potential Need of Opioid Reversal; Standing  -     lactated ringers infusion; Inject into the vein continuous.  -     Notify Anesthesiologist if Patient on Home Insulin Pump; Standing  -     Pulse Oximetry Q4H; Standing  -     Admit to Phase 1 PACU, transfer to Phase 2 per protocol when indicated ; Standing  -     Vital signs; Standing  -     Apply warming blanket; Standing  -     Notify Anesthesiologist; Standing  -     Notify Physician - Potential Need of Opioid Reversal; Standing  -     maintenance fluids per service; Standing  -     sodium chloride 0.9% flush 3 mL; Inject 3 mLs into the vein every 8 (eight) hours.  -     sodium chloride 0.9% flush 3 mL; Inject 3 mLs into the vein as needed for Line Care.  -     oxyCODONE immediate release tablet 5 mg; Take 1 tablet (5 mg total) by mouth every 3 (three) hours as needed (moderate pain 2-5/10 pain scale).  -     morphine injection 2 mg; Inject 0.5 mLs (2 mg total) into the vein every 5 (five) minutes as needed (Severe pain 6-10/10 pain scale).  -     meperidine injection 12.5 mg; Inject 0.25 mLs (12.5 mg total) into the vein once as needed for tremors.  -     metoclopramide HCl injection 10 mg; Inject 2 mLs (10 mg total) into the vein every 10 (ten) minutes as needed for Nausea/Vomiting (1st choice) - use as first treatment.  -     Oxygen Continuous; Standing  -     Pulse Oximetry Continuous; Standing  -     Discontinue: morphine injection 2 mg; Inject 1 mL (2 mg total) into the vein every 6 (six) hours as needed for severe pain 7-10/10 pain scale.  -     metoprolol tartrate (LOPRESSOR) tablet 25 mg; Take 1 tablet (25 mg total) by mouth 2 (two) times daily.  -     morphine injection 3 mg; Inject 0.75 mLs (3 mg total) into the vein every 4 (four) hours as needed for severe pain 7-10/10 pain scale.  -     amLODIPine tablet 5 mg; Take 1 tablet (5 mg total) by mouth once daily.  -     amLODIPine tablet 5 mg; Take 1 tablet (5 mg  total) by mouth once.  -     hydrALAZINE injection 10 mg; Inject 0.5 mLs (10 mg total) into the vein every 6 (six) hours as needed for SBP greater than (180).  -     POCT glucose; Standing  -     POCT glucose; Standing  -     insulin detemir U-100 pen 28 Units; Inject 28 Units into the skin every evening.  -     POCT glucose; Standing  -     sodium phosphate 30 mmol in dextrose 5 % 250 mL IVPB; Inject 30 mmol into the vein once.  -     Phosphorus; Standing  -     POCT glucose; Standing  -     POCT glucose; Standing    e      Plan:   Dehydration    Flank pain  -     EKG 12-lead; Standing    Failure to thrive in adult  -     X-Ray Chest 1 View; Standing    Gangrene of left foot  -     X-Ray Foot Complete Left; Standing  -     Case Request Operating Room: AMPUTATION, TOE, AMPUTATION, FOOT, TRANSMETATARSAL; Standing  -     US Lower Extremity Arteries Left; Standing  -     US Ankle Brachial Indices Ext LTD WO Str; Standing  -     Case Request Operating Room: AMPUTATION, FOOT, TRANSMETATARSAL; Standing  -     Case Request Operating Room: Layered Extremity Wound Closure, Left   ; Standing    Hyperglycemia    Sepsis  -     Inpatient consult to Vascular Surgery; Standing  -     Cancel: Verify surgical site; Standing  -     Cancel: Verify informed consent; Standing  -     Cancel: Void on call to OR; Standing  -     Cancel: Notify physician ; Standing  -     Cancel: Diet NPO; Standing  -     Cancel: Consult to Anesthesiology; Standing    PVD (peripheral vascular disease)  -     Inpatient consult to Vascular Surgery; Standing  -     Cancel: Verify surgical site; Standing  -     Cancel: Verify informed consent; Standing  -     Cancel: Void on call to OR; Standing  -     Cancel: Notify physician ; Standing  -     Cancel: Diet NPO; Standing  -     Cancel: Consult to Anesthesiology; Standing  -     US Lower Extremity Arteries Left; Standing  -     US Ankle Brachial Indices Ext LTD WO Str; Standing    Gas gangrene of foot  -      Inpatient consult to Vascular Surgery; Standing  -     Cancel: Verify surgical site; Standing  -     Cancel: Verify informed consent; Standing  -     Cancel: Void on call to OR; Standing  -     Cancel: Notify physician ; Standing  -     Cancel: Diet NPO; Standing  -     Cancel: Consult to Anesthesiology; Standing  -     Cancel: Diet NPO; Standing  -     Vital signs; Standing  -     POCT glucose; Standing  -     Verify surgical site; Standing  -     Verify informed consent; Standing  -     Void on call to OR; Standing  -     Cancel: Diet NPO Except for: Sips with Medication; Standing  -     Consult to Anesthesiology; Standing  -     Vital signs; Standing  -     Verify surgical site; Standing  -     Verify informed consent; Standing  -     Void on call to OR; Standing  -     Notify physician ; Standing  -     Notify PCP of Admission ; Standing  -     Diet NPO Except for: Sips with Medication; Standing  -     Consult to Anesthesiology; Standing    Type 2 diabetes mellitus with hyperglycemia, with long-term current use of insulin  -     Cancel: Diet diabetic [facility]; 2000 Calorie; Standing    Bacteremia  -     2D echo with color flow doppler; Standing    Gangrene  -     Transfer patient; Standing  -     Notify Physician - Potential Need of Opioid Reversal; Standing  -     Cancel: Pulse Oximetry Q4H; Standing  -     Transfer patient; Standing  -     X-Ray Foot 2 View Left; Standing  -     Advance diet as tolerated; Standing  -     Change dressing; Standing    Gangrene of toe of left foot  -     Transfer patient; Standing  -     Notify Physician - Potential Need of Opioid Reversal; Standing  -     Cancel: Pulse Oximetry Q4H; Standing  -     Transfer patient; Standing  -     X-Ray Foot 2 View Left; Standing  -     Advance diet as tolerated; Standing  -     Change dressing; Standing    Other orders  -     Insert peripheral IV; Standing  -     sodium chloride 0.9% bolus 1,000 mL; Inject 1,000 mLs into the vein ED 1  Time.  -     CBC auto differential; Standing  -     Urinalysis; Standing  -     TSH; Standing  -     Cardiac Monitoring - Adult; Standing  -     CK; Standing  -     CK-MB; Standing  -     Troponin I; Standing  -     CT Renal Stone Study ABD Pelvis WO; Standing  -     Brain natriuretic peptide; Standing  -     Comprehensive metabolic panel; Standing  -     morphine injection 4 mg; Inject 1 mL (4 mg total) into the vein ED 1 Time.  -     ondansetron injection 4 mg; Inject 4 mg into the vein ED 1 Time.  -     0.9%  NaCl infusion; Inject 1,000 mLs into the vein ED 1 Time.  -     Straight cath if unable to void; Standing  -     T4, free; Standing  -     Urinalysis Microscopic; Standing  -     Blood Culture #1 **CANNOT BE ORDERED STAT**; Standing  -     Blood Culture #2 **CANNOT BE ORDERED STAT**; Standing  -     Lactic acid, plasma; Standing  -     Cancel: Pharmacy to dose Vancomycin consult; Standing  -     Discontinue: piperacillin-tazobactam 4.5 g in dextrose 5 % 100 mL IVPB (ready to mix system); Inject 100 mLs (4.5 g total) into the vein every 8 (eight) hours.  -     Discontinue: vancomycin (VANCOCIN) 1,250 mg in dextrose 5 % 250 mL IVPB; Inject 1,250 mg into the vein every 48 hours.  -     Cancel: Vancomycin, random; Standing  -     Inpatient consult to Hospitalist; Standing  -     Discontinue: amLODIPine tablet 2.5 mg; Take 1 tablet (2.5 mg total) by mouth once daily.  -     aspirin EC tablet 81 mg; Take 1 tablet (81 mg total) by mouth once daily.  -     gabapentin capsule 100 mg; Take 1 capsule (100 mg total) by mouth 3 (three) times daily.  -     predniSONE tablet 5 mg; Take 1 tablet (5 mg total) by mouth once daily.  -     sodium bicarbonate tablet 2,600 mg; Take 4 tablets (2,600 mg total) by mouth 2 (two) times daily.  -     Vital Signs; Standing  -     Cardiac Monitoring - Adult; Standing  -     Progressive Mobility Protocol (mobilize patient to their highest level of functioning at least twice daily);  Standing  -     Fall precautions; Standing  -     Aspiration precautions; Standing  -     Weigh patient; Standing  -     Strict intake and output (1) Document % meals taken (2) # of urinations (3) # and consistency of BMs; Standing  -     Neuro checks; Standing  -     Notify Physician; Standing  -     sodium chloride 0.9% flush 5 mL; Inject 5 mLs into the vein as needed for Line Care.  -     Insert saline lock; Standing  -     acetaminophen tablet 650 mg; Take 2 tablets (650 mg total) by mouth every 4 (four) hours as needed for mild pain 1-3/10 pain scale or Headaches.  -     ondansetron injection 4 mg; Inject 4 mg into the vein every 8 (eight) hours as needed for Nausea/Vomiting (1st choice) - use as first treatment.  -     insulin aspart U-100 pen 0-5 Units; Inject 0-5 Units into the skin before meals and at bedtime as needed for For blood glucose (Hyperglycemia).  -     glucose chewable tablet 16 g; Take 4 tablets (16 g total) by mouth as needed for Blood Glucose less than (70 mg/dL X 1 dose for patients who can take PO.).  -     glucose chewable tablet 24 g; Take 6 tablets (24 g total) by mouth as needed for Blood Glucose less than (50 mg/dL X 1 dose for patients who can take PO.).  -     dextrose 50% injection 12.5 g; Inject 25 mLs (12.5 g total) into the vein as needed for For blood glucose (between 51 - 70 mg/dL if patient cannnot take PO but has IV access.).  -     dextrose 50% injection 25 g; Inject 50 mLs (25 g total) into the vein as needed for For blood glucose (less than 50 mg/dL if patient cannnot take PO but has IV access.).  -     glucagon (human recombinant) injection 1 mg; Inject 1 mg into the muscle as needed for Blood Glucose less than (70 mg/dL if patient is unconscious or obtunded and DOES NOT HAVE IV ACCESS.).  -     Recheck Blood Glucose:; Standing  -     TSH; Standing  -     Basic Metabolic Panel (BMP); Standing  -     Magnesium; Standing  -     Phosphorus; Standing  -     Lipid panel;  Standing  -     Hemoglobin A1c; Standing  -     CBC with Automated Differential; Standing  -     PT/INR; Standing  -     PTT; Standing  -     Urinalysis; Standing  -     Pulse Oximetry Q4H; Standing  -     Full code; Standing  -     Admit to Inpatient; Standing  -     Turn patient; Standing  -     Cancel: Diet NPO; Standing  -     IP VTE HIGH RISK PATIENT; Standing  -     Discontinue: heparin (porcine) injection 5,000 Units; Inject 1 mL (5,000 Units total) into the skin every 8 (eight) hours.  -     Place GOPI hose; Standing  -     Place sequential compression device; Standing  -     Turn cough deep breathe; Standing  -     Incentive spirometry; Standing  -     POCT glucose; Standing  -     tacrolimus capsule 1.5 mg; Take 1.5 mg by mouth 0800, 1800.  -     arformoterol nebulizer solution 15 mcg; Take 2 mLs (15 mcg total) by nebulization every 12 (twelve) hours.  -     Inhalation Treatment BID; Standing  -     budesonide nebulizer solution 0.5 mg; Take 2 mLs (0.5 mg total) by nebulization every 12 (twelve) hours.  -     Oxygen Continuous; Standing  -     Tacrolimus level; Standing  -     Inpatient consult to Nephrology; Standing  -     Phosphorus; Standing  -     Magnesium; Standing  -     morphine injection 2 mg; Inject 0.5 mLs (2 mg total) into the vein once.  -     Discontinue: traMADol tablet 50 mg; Take 1 tablet (50 mg total) by mouth every 8 (eight) hours as needed for moderate pain 4-6/10 pain scale or severe pain 7-10/10 pain scale.  -     Vancomycin, random; Standing  -     POCT glucose; Standing  -     olanzapine zydis disintegrating tablet 5 mg; Take 1 tablet (5 mg total) by mouth every evening.  -     Inpatient consult to Wound Care; Standing  -     POCT glucose; Standing  -     pantoprazole EC tablet 40 mg; Take 1 tablet (40 mg total) by mouth once daily.  -     Inpatient consult to Podiatry; Standing  -     Inpatient consult to Registered Dietitian/Nutritionist; Standing  -     Inpatient consult to  Social Work; Standing  -     PT evaluate and treat; Standing  -     OT evaluate and treat; Standing  -     T4, free; Standing  -     vancomycin (VANCOCIN) 1,250 mg in dextrose 5 % 250 mL IVPB; Inject 1,250 mg into the vein once.  -     Discontinue: vancomycin in dextrose 5 % 1 gram/250 mL PLACEHOLDER DOSE; Inject 250 mLs (1,000 mg total) into the vein every 48 hours.  -     Vancomycin, random; Standing  -     POCT glucose; Standing  -     POCT glucose; Standing  -     POCT glucose; Standing  -     CARDIAC MONITORING STRIPS  -     EKG 12-LEAD  -     Cancel: Diet diabetic [facility]; 1500 Calorie; Standing  -     Nursing communication; Standing  -     POCT glucose; Standing  -     POCT glucose; Standing  -     Discontinue: hydrALAZINE injection 10 mg; Inject 0.5 mLs (10 mg total) into the vein every 8 (eight) hours as needed for SBP greater than (180).  -     POCT glucose; Standing  -     Cancel: Diet NPO Except for: Sips with Medication; Standing  -     vancomycin (VANCOCIN) 1,250 mg in dextrose 5 % 250 mL IVPB; Inject 1,250 mg into the vein once.  -     Discontinue: vancomycin (VANCOCIN) 1,250 mg in dextrose 5 % 250 mL IVPB; Inject 1,250 mg into the vein every 18 (eighteen) hours.  -     Cancel: VANCOMYCIN, TROUGH before 3rd dose; Standing  -     POCT glucose; Standing  -     Cancel: Diet Cardiac; Standing  -     Discontinue: HYDROcodone-acetaminophen  mg per tablet 1 tablet; Take 1 tablet by mouth 3 (three) times daily as needed for moderate pain 4-6/10 pain scale.  -     POCT glucose; Standing  -     Discontinue: insulin detemir U-100 pen 10 Units; Inject 10 Units into the skin every evening.  -     HYDROcodone-acetaminophen  mg per tablet 1 tablet; Take 1 tablet by mouth every 4 (four) hours as needed for moderate pain 4-6/10 pain scale.  -     POCT glucose; Standing  -     POCT glucose; Standing  -     traZODone tablet 50 mg; Take 1 tablet (50 mg total) by mouth nightly as needed for  Insomnia.  -     Inpatient consult to Infectious Diseases; Standing  -     Blood culture; Standing  -     Blood culture; Standing  -     Nursing communication; Standing  -     nozaseptin (NOZIN) nasal ; by Each Nare route 2 (two) times daily.  -     morphine injection 2 mg; Inject 0.5 mLs (2 mg total) into the vein once.  -     insulin regular injection 6 Units; Inject 6 Units into the vein once.  -     POCT glucose; Standing  -     Discontinue: bupivacaine (PF) injection; as needed.  -     Culture, Anaerobe; Standing  -     Aerobic culture; Standing  -     Gram stain; Standing  -     Specimen to Pathology - Surgery; Standing  -     Specimen to Pathology - Surgery; Standing  -     IP VTE HIGH RISK PATIENT; Standing  -     Specimen to Pathology - Surgery; Standing  -     POCT glucose; Standing  -     Diet Cardiac; Standing  -     POCT glucose; Standing  -     piperacillin-tazobactam 4.5 g in dextrose 5 % 100 mL IVPB (ready to mix system); Inject 100 mLs (4.5 g total) into the vein every 8 (eight) hours.  -     POCT glucose; Standing  -     POCT glucose; Standing  -     Discontinue: insulin detemir U-100 pen 20 Units; Inject 20 Units into the skin every evening.  -     Phosphorus; Standing  -     POCT glucose; Standing  -     Inpatient consult to Social Work/Case Management; Standing  -     Inpatient consult to Social Work/Case Management; Standing  -     Discontinue: insulin detemir U-100 pen 25 Units; Inject 25 Units into the skin every evening.  -     POCT glucose; Standing  -     morphine injection 2 mg; Inject 1 mL (2 mg total) into the vein once.  -     Vital signs; Standing  -     Insert peripheral IV; Standing  -     Use 1% lidocaine at IV site; Standing  -     Notify Physician - Potential Need of Opioid Reversal; Standing  -     lactated ringers infusion; Inject into the vein continuous.  -     Notify Anesthesiologist if Patient on Home Insulin Pump; Standing  -     Pulse Oximetry Q4H; Standing  -      Admit to Phase 1 PACU, transfer to Phase 2 per protocol when indicated ; Standing  -     Vital signs; Standing  -     Apply warming blanket; Standing  -     Notify Anesthesiologist; Standing  -     Notify Physician - Potential Need of Opioid Reversal; Standing  -     maintenance fluids per service; Standing  -     sodium chloride 0.9% flush 3 mL; Inject 3 mLs into the vein every 8 (eight) hours.  -     sodium chloride 0.9% flush 3 mL; Inject 3 mLs into the vein as needed for Line Care.  -     oxyCODONE immediate release tablet 5 mg; Take 1 tablet (5 mg total) by mouth every 3 (three) hours as needed (moderate pain 2-5/10 pain scale).  -     morphine injection 2 mg; Inject 0.5 mLs (2 mg total) into the vein every 5 (five) minutes as needed (Severe pain 6-10/10 pain scale).  -     meperidine injection 12.5 mg; Inject 0.25 mLs (12.5 mg total) into the vein once as needed for tremors.  -     metoclopramide HCl injection 10 mg; Inject 2 mLs (10 mg total) into the vein every 10 (ten) minutes as needed for Nausea/Vomiting (1st choice) - use as first treatment.  -     Oxygen Continuous; Standing  -     Pulse Oximetry Continuous; Standing  -     Discontinue: morphine injection 2 mg; Inject 1 mL (2 mg total) into the vein every 6 (six) hours as needed for severe pain 7-10/10 pain scale.  -     metoprolol tartrate (LOPRESSOR) tablet 25 mg; Take 1 tablet (25 mg total) by mouth 2 (two) times daily.  -     morphine injection 3 mg; Inject 0.75 mLs (3 mg total) into the vein every 4 (four) hours as needed for severe pain 7-10/10 pain scale.  -     amLODIPine tablet 5 mg; Take 1 tablet (5 mg total) by mouth once daily.  -     amLODIPine tablet 5 mg; Take 1 tablet (5 mg total) by mouth once.  -     hydrALAZINE injection 10 mg; Inject 0.5 mLs (10 mg total) into the vein every 6 (six) hours as needed for SBP greater than (180).  -     POCT glucose; Standing  -     POCT glucose; Standing  -     insulin detemir U-100 pen 28 Units;  Inject 28 Units into the skin every evening.  -     POCT glucose; Standing      I counseled the patient on his conditions, regarding findings of my examination, my impressions, and usual treatment plan.   With patient's permission, the left foot wound was irrigated with sterile saline and bleeding was controlled with direct pressure. Minimal blood loss.  Wound was then dressed with packing, gauze, kerlix, and ACE.   Patient to OR in am for delayed primary closure.             Katerina Magaña DPM  [facility]

## 2018-09-23 NOTE — PT/OT/SLP PROGRESS
Physical Therapy      Patient Name:  Lavelle Ladd   MRN:  [id number]    Patient not seen today secondary to PT REFUSING THERAPY, STATING HE GETS UP AND MOVES ON HIS OWN.  PT EDUCATED ON NWB STATUS PER DR DAVIDSON, AND SAFETY WITH TRANSFERS NWB LLE.  PT ALSO EDUCATED ON REASONS FOR NWB, AND RISKS OF FALLS, AND TO CALL FOR ASSISTANCE.  HE VERBALIZED UNDERSTANDING.      Abbey Estrada, PT                  09/23/18                     [id number]

## 2018-09-23 NOTE — ASSESSMENT & PLAN NOTE
Dr. Estrada with renal consulted due to patient with transplant.  He reports patient can stay here  Renal consulted   Monitor rfp    9/22  IVF's  Nephrology     9/23  IVF's  Nephrology  Improved

## 2018-09-23 NOTE — ASSESSMENT & PLAN NOTE
Hold bp meds at this time due to sepsis   Monitor trends and restart once need    -sepsis status improving.  -Restart BP meds.  -Monitor VS    9/22  [facility]  Monitor    9/23  Stable  Monitor

## 2018-09-23 NOTE — SUBJECTIVE & OBJECTIVE
Interval History:     9/21/18: s/p left TMA today. Patient reports fatigue and mild abdominal pain (he had fall and fell on his abdomen, work-up was negative).     9/22/18: Patient states that he has been drinking plenty of fluids. He reports intermittent back pain.     9/23/18: Patient is feeling OK today. He mentions that he was diagnosed with AAA and request follow-up as outpatient.             Review of patient's allergies indicates:  No Known Allergies  Current Facility-Administered Medications   Medication Frequency    acetaminophen tablet 650 mg Q4H PRN    amLODIPine tablet 5 mg Daily    arformoterol nebulizer solution 15 mcg Q12H    aspirin EC tablet 81 mg Daily    budesonide nebulizer solution 0.5 mg Q12H    dextrose 50% injection 12.5 g PRN    dextrose 50% injection 25 g PRN    gabapentin capsule 100 mg TID    glucagon (human recombinant) injection 1 mg PRN    glucose chewable tablet 16 g PRN    glucose chewable tablet 24 g PRN    hydrALAZINE injection 10 mg Q6H PRN    HYDROcodone-acetaminophen  mg per tablet 1 tablet Q4H PRN    insulin aspart U-100 pen 0-5 Units QID (AC + HS) PRN    insulin detemir U-100 pen 28 Units QHS    metoprolol tartrate (LOPRESSOR) tablet 25 mg BID    morphine injection 3 mg Q4H PRN    nozaseptin (NOZIN) nasal  BID    olanzapine zydis disintegrating tablet 5 mg QHS    ondansetron injection 4 mg Q8H PRN    pantoprazole EC tablet 40 mg Daily    piperacillin-tazobactam 4.5 g in dextrose 5 % 100 mL IVPB (ready to mix system) Q8H    predniSONE tablet 5 mg Daily    sodium bicarbonate tablet 2,600 mg BID    sodium chloride 0.9% flush 5 mL PRN    tacrolimus capsule 1.5 mg BID    traZODone tablet 50 mg Nightly PRN       Objective:     Vital Signs (Most Recent):  Temp: 97 °F (36.1 °C) (09/23/18 0739)  Pulse: 61 (09/23/18 0900)  Resp: 16 (09/23/18 0757)  BP: 135/79 (09/23/18 0739)  SpO2: (!) 94 % (09/23/18 0757)  O2 Device (Oxygen Therapy): room air  (09/23/18 0757) Vital Signs (24h Range):  Temp:  [96.2 °F (35.7 °C)-98.5 °F (36.9 °C)] 97 °F (36.1 °C)  Pulse:  [59-83] 61  Resp:  [16-18] 16  SpO2:  [91 %-98 %] 94 %  BP: (123-207)/(76-97) 135/79     Weight: 93 kg (205 lb 0.4 oz) (09/21/18 1100)  Body mass index is 28.6 kg/m².  Body surface area is 2.16 meters squared.    I/O last 3 completed shifts:  In: 1950 [P.O.:1720; I.V.:30; IV Piggyback:200]  Out: 3600 [Urine:3600]    Physical Exam   Constitutional: He is oriented to person, place, and time. He appears well-developed. He is cooperative.   HENT:   Head: Normocephalic.   Nose: No rhinorrhea.   Mouth/Throat: Mucous membranes are normal. No oropharyngeal exudate.   Eyes: Pupils are equal, round, and reactive to light.   Neck: No thyroid mass present.   Cardiovascular: Normal rate, regular rhythm, S1 normal, S2 normal and intact distal pulses.   Pulmonary/Chest: Effort normal. No respiratory distress. He has no wheezes.   Abdominal: Soft. Bowel sounds are normal. He exhibits no distension. There is no tenderness. No hernia.   Musculoskeletal: Tenderness: lastpt.   S/p left TMA.    S/p right BKA.   Lymphadenopathy:     He has no cervical adenopathy.   Neurological: He is alert and oriented to person, place, and time.   Skin: Skin is warm and dry.   Psychiatric: He has a normal mood and affect.       Significant Labs:  Lab Results   Component Value Date    CREATININE 1.5 (H) 09/23/2018    BUN 25 (H) 09/23/2018     09/23/2018    K 3.6 09/23/2018     09/23/2018    CO2 25 09/23/2018     Lab Results   Component Value Date    .0 (H) 10/30/2017    CALCIUM 9.1 09/23/2018    CAION 1.10 05/30/2016    PHOS 2.1 (L) 09/22/2018     Lab Results   Component Value Date    ALBUMIN 2.5 (L) 09/18/2018     Lab Results   Component Value Date    WBC 8.75 09/23/2018    HGB 12.7 (L) 09/23/2018    HCT 39.3 (L) 09/23/2018    MCV 89 09/23/2018     09/23/2018     Recent Labs   Lab  09/18/18   1446   MG  1.9      Tacrolimus: 5.3 (9/19/18)      Significant Imaging:  Imaging Results          X-Ray Foot Complete Left (Final result)  Result time 09/18/18 19:50:43    Final result by Nicolas Sawyer MD (09/18/18 19:50:43)                 Impression:      1.  Progressive erosive changes with bone loss involving the 1st distal phalanx concerning for osteomyelitis.    2.  Air bubbles within the left 5th toe, concerning for possible infectious process or gangrene.  Clinical correlation is advised.    3.  Progressive soft tissue loss involving the 4th toe, which could also indicate gangrene.  Clinical correlation is advised.    4.  Persistent healing fracture of the 2nd proximal phalanx.  Other stable findings as noted above.      Electronically signed by: Nicolas Sawyer MD  Date:    09/18/2018  Time:    19:50             Narrative:    EXAMINATION:  XR FOOT COMPLETE 3 VIEW LEFT    CLINICAL HISTORY:  Pain in left foot.  Gangrene, not elsewhere classified    TECHNIQUE:  AP, lateral and oblique views of the left foot were performed.    COMPARISON:  August 16, 2018    FINDINGS:  There is been interval bone loss involving the distal 1st phalanx, with overlying soft tissue defect.  Healing fracture of the 2nd proximal phalanx again seen.  There has been progressive soft tissue loss overlying the 4th toe and there are air bubbles in the 5th toe soft tissues.  No other bone loss is seen.    Plantar and Achilles calcaneal spurs.  Osteopenia.  Vascular calcifications.  Accessory ossicle adjacent to the navicular bone.                               CT Renal Stone Study ABD Pelvis WO (Final result)  Result time 09/18/18 18:31:38    Final result by Nicolas Sawyer MD (09/18/18 18:31:38)                 Impression:      1.  Negative for acute process involving the abdomen or pelvis.  Negative for inflammatory changes.  Normal appendix.  Negative for renal stone disease or hydronephrosis, in this patient who has atrophic native kidneys and a right  lower quadrant transplant kidney.    2. Again seen is mild prominence of the transplant kidney pelvocaliceal system, without dilation of the ureter, likely a chronic finding.    3.  Mild distention of the gallbladder, which is otherwise normal, nonspecific finding.    4.  Fluid within the nondilated colon, which can be seen in patients with diarrhea or gastroenteritis.  Clinical correlation is advised.    5.  Dependent atelectasis in the lung bases.    6.  Moderate sliding-type hiatal hernia with possible distal esophageal thickening.  Does the patient have symptoms of esophagitis?    7.  Stable findings as noted above.    All CT scans at this facility are performed  using dose modulation techniques as appropriate to performed exam including the following:  automated exposure control; adjustment of mA and/or kV according to the patients size (this includes techniques or standardized protocols for targeted exams where dose is matched to indication/reason for exam: i.e. extremities or head);  iterative reconstruction technique.      Electronically signed by: Nicolas Sawyer MD  Date:    09/18/2018  Time:    18:31             Narrative:    EXAMINATION:  CT RENAL STONE STUDY ABD PELVIS WO    CLINICAL HISTORY:  Flank pain, stone disease suspected;    TECHNIQUE:  Axial images through the abdomen and pelvis were obtained without the use of IV contrast.  Sagittal and coronal reconstructions are provided for review.  Oral contrast was not utilized.    COMPARISON:  December 11, 2016    FINDINGS:  LUNG BASES: Respiratory motion artifact is present on a number of images.  Subtle abnormalities could be overlooked.  Stable scarring or atelectasis in the inferior lingula and middle lobe.  Lung bases are otherwise clear.  Negative for pleural effusions.  There is a small amount of pericardial fluid.  Coronary artery calcifications.  Moderate size hiatal hernia with thickening of the distal esophagus.    ABDOMEN: The gallbladder is  mildly distended.  No surrounding inflammatory changes or wall thickening is seen.  Bilateral perinephric stranding densities noted, with mild atrophy of both kidneys again seen.  Vascular calcifications within the spleen.  Again seen is mild prominence of the biliary tree measuring up to 8 mm.  The liver, spleen and gallbladder otherwise appear normal.  The pancreas is unremarkable.  Kidneys and adrenal glands are otherwise normal.  Negative for hydronephrosis or renal stone disease.  Significant vascular calcifications are present within the renal pelves, with bilateral renal sinus lipomatosis again seen.    There remains to be a transplant kidney in the right anterior hemipelvis.  Mild prominence of the renal collecting system is again seen.  Negative for perinephric stranding densities.  Negative for definite renal stones.    Negative for adenopathy, free fluid or inflammatory change noted within the abdomen or pelvis.  Vascular calcifications are present without aneurysmal changes, with the aorta measuring a maximum of 2.9 cm.    The small bowel appears normal. Normal appendix.  There is fluid within the proximal and mid colon, without dilation or surrounding inflammatory changes.    PELVIS: The urinary bladder is mildly thick wall, a stable finding.  Prostate calcifications.  The   pelvic organs are breast of e-mail.  There are pelvic phleboliths.    No significant osseous abnormality is identified.  Negative for significant spinal canal stenosis. Similar degree of degenerative disc changes throughout the thoracic and lumbar spine, most significantly involving L3/L4.    Negative for groin adenopathy.    Postoperative changes involve the right pelvic abdominal wall from the transplant kidney placement.  The abdominal wall is intact.                               X-Ray Chest 1 View (Final result)  Result time 09/18/18 17:03:13    Final result by ANA CRISTINA Joseph Sr., MD (09/18/18 17:03:13)                  Impression:      1. There is a mild amount of haziness in the lateral aspect of the base of the left hemithorax.  This is characteristic of atelectasis or subtle pneumonia.  2. The left costophrenic angle is blunted.  This is characteristic of a tiny pleural effusion.  3. There is a mild amount of levoconvex curvature of the thoracic spine.  4. There is mild cardiomegaly.  .      Electronically signed by: Porter Joseph MD  Date:    09/18/2018  Time:    17:03             Narrative:    EXAMINATION:  XR CHEST 1 VIEW    CLINICAL HISTORY:  Adult failure to thrive    COMPARISON:  07/20/2018    FINDINGS:  The size of the heart is prominent.  There is a mild amount of haziness in the lateral aspect of the base of the left hemithorax.  The right lung is clear.  There is no pneumothorax.  The left costophrenic angle is blunted.  The right costophrenic angle is sharp.  There is a mild amount of levoconvex curvature of the thoracic spine.

## 2018-09-23 NOTE — SUBJECTIVE & OBJECTIVE
Interval History: Awaiting wound closure planned for tomorrow. Non Weight Bearing    Review of Systems   Constitutional: Positive for activity change and fatigue. Negative for appetite change and fever.   HENT: Negative.  Negative for congestion, sinus pressure and sore throat.    Eyes: Negative.  Negative for photophobia, discharge and visual disturbance.   Respiratory: Negative.  Negative for cough, chest tightness, shortness of breath and wheezing.    Cardiovascular: Negative.  Negative for chest pain and palpitations.   Gastrointestinal: Negative.  Negative for abdominal pain, blood in stool, constipation, diarrhea, nausea and vomiting.   Endocrine: Negative.    Genitourinary: Negative.    Musculoskeletal: Positive for myalgias. Negative for neck pain and neck stiffness.   Skin: Positive for wound.   Allergic/Immunologic: Negative.    Neurological: Positive for weakness. Negative for seizures, syncope and headaches.   Hematological: Negative.    Psychiatric/Behavioral: Negative.  Negative for agitation, behavioral problems, hallucinations, self-injury and suicidal ideas.     Objective:     Vital Signs (Most Recent):  Temp: 97 °F (36.1 °C) (09/23/18 0739)  Pulse: 62 (09/23/18 0757)  Resp: 16 (09/23/18 0757)  BP: 135/79 (09/23/18 0739)  SpO2: (!) 94 % (09/23/18 0757) Vital Signs (24h Range):  Temp:  [96.2 °F (35.7 °C)-98.5 °F (36.9 °C)] 97 °F (36.1 °C)  Pulse:  [59-83] 62  Resp:  [16-18] 16  SpO2:  [91 %-98 %] 94 %  BP: (123-207)/(76-97) 135/79     Weight: 93 kg (205 lb 0.4 oz)  Body mass index is 28.6 kg/m².    Intake/Output Summary (Last 24 hours) at 9/23/2018 0945  Last data filed at 9/23/2018 0500  Gross per 24 hour   Intake 1710 ml   Output 2600 ml   Net -890 ml      Physical Exam   Constitutional: He is oriented to person, place, and time. He appears well-developed and well-nourished.   HENT:   Head: Normocephalic and atraumatic.   Eyes: EOM are normal. Pupils are equal, round, and reactive to light.   Neck:  Normal range of motion. Neck supple.   Cardiovascular: Normal rate, regular rhythm and intact distal pulses.   Murmur heard.   Systolic murmur is present with a grade of 3/6.  Pulmonary/Chest: Effort normal and breath sounds normal. No respiratory distress. He has no wheezes.   Abdominal: Soft. Bowel sounds are normal.   Musculoskeletal: Normal range of motion. He exhibits no deformity.        Legs:  Feet:   Left Foot: amputated  Neurological: He is alert and oriented to person, place, and time. He has normal reflexes.   Skin: Skin is warm and dry. Capillary refill takes 2 to 3 seconds.   Psychiatric: He has a normal mood and affect. His behavior is normal. Judgment and thought content normal.   Nursing note and vitals reviewed.      Significant Labs:   Blood Culture:   Recent Labs   Lab  09/21/18   1516   LABBLOO  No Growth to date  No Growth to date     BMP:   Recent Labs   Lab  09/23/18   0553   GLU  151*   NA  138   K  3.6   CL  101   CO2  25   BUN  25*   CREATININE  1.5*   CALCIUM  9.1     CBC:   Recent Labs   Lab  09/22/18   0521  09/23/18   0553   WBC  12.39  8.75   HGB  12.3*  12.7*   HCT  38.0*  39.3*   PLT  283  292     CMP:   Recent Labs   Lab  09/22/18   0521  09/23/18   0553   NA  132*  138   K  4.1  3.6   CL  96  101   CO2  22*  25   GLU  382*  151*   BUN  28*  25*   CREATININE  1.8*  1.5*   CALCIUM  9.0  9.1   ANIONGAP  14  12   EGFRNONAA  39*  48*     All pertinent labs within the past 24 hours have been reviewed.    Significant Imaging: I have reviewed all pertinent imaging results/findings within the past 24 hours.

## 2018-09-23 NOTE — ASSESSMENT & PLAN NOTE
Hgb A1c 8.0  --accuchecks and SSI  --Levemir 15U qd    9/22  Increase Levemir to 25 units    9/23  Improved control  Levemir to 28

## 2018-09-23 NOTE — PROGRESS NOTES
[facility] -   Nephrology  Progress Note    Patient Name: Lavelle Ladd  MRN: [id number]  Admission Date: 9/18/2018  Hospital Length of Stay: 5 days  Attending Provider: Chapin Proctor MD   Primary Care Physician: Rishabh Esteban MD  Principal Problem:Gas gangrene of foot    Subjective:     HPI: Patient is a 63-year-old male with type 1 diabetes with multiple complications including peripheral neuropathy and peripheral arterial disease.  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  Patient has been struggling with left foot wound and peripheral arterial disease along with osteomyelitis and gangrene of the left foot.  Patient is admitted with severe infection of the left foot with the possibility of needing surgical intervention.  Patient's baseline creatinine has been ranging between 1.6 and 1.9.  Patient's creatinine on admission is 1.9 and today is 1.8.  Patient is off and on confused and in bed social situation at home he was found to have feces all over himself.  Patient has an elderly mother at home as well with multiple social issues which are being addressed at this time.  Patient seen and examined in the hospital room bedside for renal transplant issues.  Patient has been on Prograf and prednisone.  No CellCept has been given to her at this time.    Interval History:     9/21/18: s/p left TMA today. Patient reports fatigue and mild abdominal pain (he had fall and fell on his abdomen, work-up was negative).     9/22/18: Patient states that he has been drinking plenty of fluids. He reports intermittent back pain.     9/23/18: Patient is feeling OK today. He mentions that he was diagnosed with AAA and request follow-up as outpatient.             Review of patient's allergies indicates:  No Known Allergies  Current Facility-Administered Medications   Medication Frequency    acetaminophen tablet 650 mg Q4H PRN    amLODIPine tablet 5 mg Daily    arformoterol nebulizer solution 15 mcg Q12H    aspirin EC  tablet 81 mg Daily    budesonide nebulizer solution 0.5 mg Q12H    dextrose 50% injection 12.5 g PRN    dextrose 50% injection 25 g PRN    gabapentin capsule 100 mg TID    glucagon (human recombinant) injection 1 mg PRN    glucose chewable tablet 16 g PRN    glucose chewable tablet 24 g PRN    hydrALAZINE injection 10 mg Q6H PRN    HYDROcodone-acetaminophen  mg per tablet 1 tablet Q4H PRN    insulin aspart U-100 pen 0-5 Units QID (AC + HS) PRN    insulin detemir U-100 pen 28 Units QHS    metoprolol tartrate (LOPRESSOR) tablet 25 mg BID    morphine injection 3 mg Q4H PRN    nozaseptin (NOZIN) nasal  BID    olanzapine zydis disintegrating tablet 5 mg QHS    ondansetron injection 4 mg Q8H PRN    pantoprazole EC tablet 40 mg Daily    piperacillin-tazobactam 4.5 g in dextrose 5 % 100 mL IVPB (ready to mix system) Q8H    predniSONE tablet 5 mg Daily    sodium bicarbonate tablet 2,600 mg BID    sodium chloride 0.9% flush 5 mL PRN    tacrolimus capsule 1.5 mg BID    traZODone tablet 50 mg Nightly PRN       Objective:     Vital Signs (Most Recent):  Temp: 97 °F (36.1 °C) (09/23/18 0739)  Pulse: 61 (09/23/18 0900)  Resp: 16 (09/23/18 0757)  BP: 135/79 (09/23/18 0739)  SpO2: (!) 94 % (09/23/18 0757)  O2 Device (Oxygen Therapy): room air (09/23/18 0757) Vital Signs (24h Range):  Temp:  [96.2 °F (35.7 °C)-98.5 °F (36.9 °C)] 97 °F (36.1 °C)  Pulse:  [59-83] 61  Resp:  [16-18] 16  SpO2:  [91 %-98 %] 94 %  BP: (123-207)/(76-97) 135/79     Weight: 93 kg (205 lb 0.4 oz) (09/21/18 1100)  Body mass index is 28.6 kg/m².  Body surface area is 2.16 meters squared.    I/O last 3 completed shifts:  In: 1950 [P.O.:1720; I.V.:30; IV Piggyback:200]  Out: 3600 [Urine:3600]    Physical Exam   Constitutional: He is oriented to person, place, and time. He appears well-developed. He is cooperative.   HENT:   Head: Normocephalic.   Nose: No rhinorrhea.   Mouth/Throat: Mucous membranes are normal. No  oropharyngeal exudate.   Eyes: Pupils are equal, round, and reactive to light.   Neck: No thyroid mass present.   Cardiovascular: Normal rate, regular rhythm, S1 normal, S2 normal and intact distal pulses.   Pulmonary/Chest: Effort normal. No respiratory distress. He has no wheezes.   Abdominal: Soft. Bowel sounds are normal. He exhibits no distension. There is no tenderness. No hernia.   Musculoskeletal: Tenderness: lastpt.   S/p left TMA.    S/p right BKA.   Lymphadenopathy:     He has no cervical adenopathy.   Neurological: He is alert and oriented to person, place, and time.   Skin: Skin is warm and dry.   Psychiatric: He has a normal mood and affect.       Significant Labs:  Lab Results   Component Value Date    CREATININE 1.5 (H) 09/23/2018    BUN 25 (H) 09/23/2018     09/23/2018    K 3.6 09/23/2018     09/23/2018    CO2 25 09/23/2018     Lab Results   Component Value Date    .0 (H) 10/30/2017    CALCIUM 9.1 09/23/2018    CAION 1.10 05/30/2016    PHOS 2.1 (L) 09/22/2018     Lab Results   Component Value Date    ALBUMIN 2.5 (L) 09/18/2018     Lab Results   Component Value Date    WBC 8.75 09/23/2018    HGB 12.7 (L) 09/23/2018    HCT 39.3 (L) 09/23/2018    MCV 89 09/23/2018     09/23/2018     Recent Labs   Lab  09/18/18   1446   MG  1.9     Tacrolimus: 5.3 (9/19/18)      Significant Imaging:  Imaging Results          X-Ray Foot Complete Left (Final result)  Result time 09/18/18 19:50:43    Final result by Nicolas Sawyer MD (09/18/18 19:50:43)                 Impression:      1.  Progressive erosive changes with bone loss involving the 1st distal phalanx concerning for osteomyelitis.    2.  Air bubbles within the left 5th toe, concerning for possible infectious process or gangrene.  Clinical correlation is advised.    3.  Progressive soft tissue loss involving the 4th toe, which could also indicate gangrene.  Clinical correlation is advised.    4.  Persistent healing fracture of the 2nd  proximal phalanx.  Other stable findings as noted above.      Electronically signed by: Nicolas Sawyer MD  Date:    09/18/2018  Time:    19:50             Narrative:    EXAMINATION:  XR FOOT COMPLETE 3 VIEW LEFT    CLINICAL HISTORY:  Pain in left foot.  Gangrene, not elsewhere classified    TECHNIQUE:  AP, lateral and oblique views of the left foot were performed.    COMPARISON:  August 16, 2018    FINDINGS:  There is been interval bone loss involving the distal 1st phalanx, with overlying soft tissue defect.  Healing fracture of the 2nd proximal phalanx again seen.  There has been progressive soft tissue loss overlying the 4th toe and there are air bubbles in the 5th toe soft tissues.  No other bone loss is seen.    Plantar and Achilles calcaneal spurs.  Osteopenia.  Vascular calcifications.  Accessory ossicle adjacent to the navicular bone.                               CT Renal Stone Study ABD Pelvis WO (Final result)  Result time 09/18/18 18:31:38    Final result by Nicolas Sawyer MD (09/18/18 18:31:38)                 Impression:      1.  Negative for acute process involving the abdomen or pelvis.  Negative for inflammatory changes.  Normal appendix.  Negative for renal stone disease or hydronephrosis, in this patient who has atrophic native kidneys and a right lower quadrant transplant kidney.    2. Again seen is mild prominence of the transplant kidney pelvocaliceal system, without dilation of the ureter, likely a chronic finding.    3.  Mild distention of the gallbladder, which is otherwise normal, nonspecific finding.    4.  Fluid within the nondilated colon, which can be seen in patients with diarrhea or gastroenteritis.  Clinical correlation is advised.    5.  Dependent atelectasis in the lung bases.    6.  Moderate sliding-type hiatal hernia with possible distal esophageal thickening.  Does the patient have symptoms of esophagitis?    7.  Stable findings as noted above.    All CT scans at this facility  are performed  using dose modulation techniques as appropriate to performed exam including the following:  automated exposure control; adjustment of mA and/or kV according to the patients size (this includes techniques or standardized protocols for targeted exams where dose is matched to indication/reason for exam: i.e. extremities or head);  iterative reconstruction technique.      Electronically signed by: Nicolas Sawyer MD  Date:    09/18/2018  Time:    18:31             Narrative:    EXAMINATION:  CT RENAL STONE STUDY ABD PELVIS WO    CLINICAL HISTORY:  Flank pain, stone disease suspected;    TECHNIQUE:  Axial images through the abdomen and pelvis were obtained without the use of IV contrast.  Sagittal and coronal reconstructions are provided for review.  Oral contrast was not utilized.    COMPARISON:  December 11, 2016    FINDINGS:  LUNG BASES: Respiratory motion artifact is present on a number of images.  Subtle abnormalities could be overlooked.  Stable scarring or atelectasis in the inferior lingula and middle lobe.  Lung bases are otherwise clear.  Negative for pleural effusions.  There is a small amount of pericardial fluid.  Coronary artery calcifications.  Moderate size hiatal hernia with thickening of the distal esophagus.    ABDOMEN: The gallbladder is mildly distended.  No surrounding inflammatory changes or wall thickening is seen.  Bilateral perinephric stranding densities noted, with mild atrophy of both kidneys again seen.  Vascular calcifications within the spleen.  Again seen is mild prominence of the biliary tree measuring up to 8 mm.  The liver, spleen and gallbladder otherwise appear normal.  The pancreas is unremarkable.  Kidneys and adrenal glands are otherwise normal.  Negative for hydronephrosis or renal stone disease.  Significant vascular calcifications are present within the renal pelves, with bilateral renal sinus lipomatosis again seen.    There remains to be a transplant kidney in  the right anterior hemipelvis.  Mild prominence of the renal collecting system is again seen.  Negative for perinephric stranding densities.  Negative for definite renal stones.    Negative for adenopathy, free fluid or inflammatory change noted within the abdomen or pelvis.  Vascular calcifications are present without aneurysmal changes, with the aorta measuring a maximum of 2.9 cm.    The small bowel appears normal. Normal appendix.  There is fluid within the proximal and mid colon, without dilation or surrounding inflammatory changes.    PELVIS: The urinary bladder is mildly thick wall, a stable finding.  Prostate calcifications.  The   pelvic organs are breast of e-mail.  There are pelvic phleboliths.    No significant osseous abnormality is identified.  Negative for significant spinal canal stenosis. Similar degree of degenerative disc changes throughout the thoracic and lumbar spine, most significantly involving L3/L4.    Negative for groin adenopathy.    Postoperative changes involve the right pelvic abdominal wall from the transplant kidney placement.  The abdominal wall is intact.                               X-Ray Chest 1 View (Final result)  Result time 09/18/18 17:03:13    Final result by ANA CRISTINA Joseph Sr., MD (09/18/18 17:03:13)                 Impression:      1. There is a mild amount of haziness in the lateral aspect of the base of the left hemithorax.  This is characteristic of atelectasis or subtle pneumonia.  2. The left costophrenic angle is blunted.  This is characteristic of a tiny pleural effusion.  3. There is a mild amount of levoconvex curvature of the thoracic spine.  4. There is mild cardiomegaly.  .      Electronically signed by: Porter Joseph MD  Date:    09/18/2018  Time:    17:03             Narrative:    EXAMINATION:  XR CHEST 1 VIEW    CLINICAL HISTORY:  Adult failure to thrive    COMPARISON:  07/20/2018    FINDINGS:  The size of the heart is prominent.  There is a mild amount  of haziness in the lateral aspect of the base of the left hemithorax.  The right lung is clear.  There is no pneumothorax.  The left costophrenic angle is blunted.  The right costophrenic angle is sharp.  There is a mild amount of levoconvex curvature of the thoracic spine.                                  Assessment/Plan:     Hypophosphatemia    Replete phos.           Gangrene of left foot    S/p left TMA on 18. Podiatry is following.         Osteomyelitis of left foot    Continue antibiotics.         Chronic kidney disease (CKD), stage III (moderate)    Patient with CKD 3. Creatinine has declined to 1.5 (from 1.8).          donor kidney transplant for DM 16    Patient's renal function has improved with creatinine declining to 1.5.     Continue immunosuppression with Prograf, prednisone.     Will monitor closely with repeat renal panel.    Tacrolimus at goal.             Thank you for your consult. I will follow-up with patient. Please contact us if you have any additional questions.    Bud Tam MD  Nephrology  [facility]

## 2018-09-23 NOTE — ASSESSMENT & PLAN NOTE
--vascular and podiatry following  --TMA today (9/21)  --continue daily  ASA    9/23  NPO after MN for wound closure tomorrow per podiatry

## 2018-09-23 NOTE — PLAN OF CARE
Problem: Patient Care Overview  Goal: Plan of Care Review  Outcome: Ongoing (interventions implemented as appropriate)  POC reviewed, verbalized understanding. Pt remained free from falls, fall precautions in place. Pt is on monitor. VSS. No other c/o at this time. PIV intact. Call bell and personal belongings within reach. Hourly rounding complete. Reminded to call for assistance. Will continue to monitor., plan to closed wound on Monday,

## 2018-09-23 NOTE — NURSING
Spoke with Dr. Proctor concerning pt's work with PT. States that pt should work on transferring and should remain non-weightbearing.

## 2018-09-23 NOTE — SUBJECTIVE & OBJECTIVE
Interval History:  63 year old man with history of     Review of Systems   Constitutional: Negative for chills, diaphoresis, fatigue and fever.   HENT: Negative for congestion, postnasal drip, rhinorrhea and sore throat.    Eyes: Negative for pain and visual disturbance.   Respiratory: Negative for cough, shortness of breath and wheezing.    Cardiovascular: Negative for chest pain, palpitations and leg swelling.   Gastrointestinal: Negative for abdominal distention, abdominal pain, constipation, diarrhea, nausea and vomiting.   Endocrine: Negative for cold intolerance and heat intolerance.   Genitourinary: Negative for difficulty urinating, dysuria and hematuria.   Musculoskeletal: Positive for back pain.   Skin: Positive for wound.   Allergic/Immunologic: Positive for immunocompromised state.   Neurological: Negative for dizziness, syncope, speech difficulty, weakness, light-headedness and headaches.   Hematological: Negative.    Psychiatric/Behavioral: Negative for agitation, behavioral problems and confusion.     Objective:     Vital Signs (Most Recent):  Temp: 97 °F (36.1 °C) (09/23/18 0739)  Pulse: 63 (09/23/18 0739)  Resp: 16 (09/23/18 0739)  BP: 135/79 (09/23/18 0739)  SpO2: 98 % (09/23/18 0739) Vital Signs (24h Range):  Temp:  [96.2 °F (35.7 °C)-98.5 °F (36.9 °C)] 97 °F (36.1 °C)  Pulse:  [59-83] 63  Resp:  [16-18] 16  SpO2:  [91 %-98 %] 98 %  BP: (123-207)/(76-97) 135/79     Weight: 93 kg (205 lb 0.4 oz)  Body mass index is 28.6 kg/m².    Estimated Creatinine Clearance: 57.2 mL/min (A) (based on SCr of 1.5 mg/dL (H)).    Physical Exam   Constitutional: He is oriented to person, place, and time. He appears well-developed. He is cooperative.   HENT:   Head: Normocephalic.   Nose: No rhinorrhea.   Mouth/Throat: Mucous membranes are normal. No oropharyngeal exudate.   Eyes: Pupils are equal, round, and reactive to light.   Neck: No thyroid mass present.   Cardiovascular: Normal rate, regular rhythm, S1 normal,  S2 normal and intact distal pulses.   Pulmonary/Chest: Effort normal. No respiratory distress. He has no wheezes.   Abdominal: Soft. Bowel sounds are normal. He exhibits no distension. There is no tenderness. No hernia.   Musculoskeletal: Tenderness: lastpt.   S/p left TMA.    S/p right BKA.   Lymphadenopathy:     He has no cervical adenopathy.   Neurological: He is alert and oriented to person, place, and time.   Skin: Skin is warm and dry.   Psychiatric: He has a normal mood and affect.   Nursing note and vitals reviewed.      Significant Labs:   Blood Culture:   Recent Labs   Lab  07/20/18   1415  09/18/18   1800  09/18/18   1812  09/21/18   0907  09/21/18   1516   LABBLOO  No growth after 5 days.  Gram stain susan bottle: Gram positive cocci in clusters resembling Staph   Results called to and read back by:NOA Eckert RN 09/19/2018  15:30  Gram stain aer bottle: Gram positive cocci in clusters resembling Staph   09/19/2018  15:56  STAPHYLOCOCCUS AUREUS  ID consult required at [facility] locations.    Gram stain aer bottle: Gram positive cocci in clusters resembling Staph   Gram stain susan bottle: Gram positive cocci in clusters resembling Staph   Results called to and read back by:NOA Eckert RN 09/19/2018  15:31  STAPHYLOCOCCUS AUREUS  ID consult required at [facility].  For susceptibility see order # [id number]    No Growth to date  No Growth to date  No Growth to date  No Growth to date     BMP:   Recent Labs   Lab  09/23/18   0553   GLU  151*   NA  138   K  3.6   CL  101   CO2  25   BUN  25*   CREATININE  1.5*   CALCIUM  9.1     CBC:   Recent Labs   Lab  09/22/18   0521  09/23/18   0553   WBC  12.39  8.75   HGB  12.3*  12.7*   HCT  38.0*  39.3*   PLT  283  292     All pertinent labs within the past 24 hours have been reviewed.    Significant Imaging: I have reviewed all pertinent imaging results/findings within the past 24 hours.

## 2018-09-23 NOTE — ASSESSMENT & PLAN NOTE
--BC grew STAPHYLOCOCCUS AUREUS, sensitivities pending   --continue IV Vancomycin  --ID consulted  --repeat bc drawn 9/21 9/22  ABX  ID  Monitor  PICC  Home IV abx infusion    9/23  ABX  ID on Consult  PICC

## 2018-09-23 NOTE — ASSESSMENT & PLAN NOTE
--registered dietitian following    9/22  Diet  Monitor      9/23  Nutrition  Refusing Post Acute Services - SNF  Will get H/H [facility]

## 2018-09-24 ENCOUNTER — ANESTHESIA (OUTPATIENT)
Dept: SURGERY | Facility: HOSPITAL | Age: 63
DRG: 853 | End: 2018-09-24
Payer: MEDICARE

## 2018-09-24 PROBLEM — A48.0 GAS GANGRENE: Status: ACTIVE | Noted: 2018-09-24

## 2018-09-24 LAB
ANION GAP SERPL CALC-SCNC: 12 MMOL/L
BACTERIA SPEC AEROBE CULT: NORMAL
BASOPHILS # BLD AUTO: 0.02 K/UL
BASOPHILS NFR BLD: 0.2 %
BUN SERPL-MCNC: 25 MG/DL
CALCIUM SERPL-MCNC: 8.7 MG/DL
CHLORIDE SERPL-SCNC: 101 MMOL/L
CO2 SERPL-SCNC: 23 MMOL/L
CREAT SERPL-MCNC: 1.6 MG/DL
DIFFERENTIAL METHOD: ABNORMAL
EOSINOPHIL # BLD AUTO: 0.1 K/UL
EOSINOPHIL NFR BLD: 1.5 %
ERYTHROCYTE [DISTWIDTH] IN BLOOD BY AUTOMATED COUNT: 13.9 %
EST. GFR  (AFRICAN AMERICAN): 51 ML/MIN/1.73 M^2
EST. GFR  (NON AFRICAN AMERICAN): 45 ML/MIN/1.73 M^2
GLUCOSE SERPL-MCNC: 216 MG/DL
HCT VFR BLD AUTO: 38.7 %
HGB BLD-MCNC: 12.5 G/DL
LYMPHOCYTES # BLD AUTO: 1.5 K/UL
LYMPHOCYTES NFR BLD: 17.4 %
MCH RBC QN AUTO: 28.9 PG
MCHC RBC AUTO-ENTMCNC: 32.3 G/DL
MCV RBC AUTO: 89 FL
MONOCYTES # BLD AUTO: 0.9 K/UL
MONOCYTES NFR BLD: 10.8 %
NEUTROPHILS # BLD AUTO: 6 K/UL
NEUTROPHILS NFR BLD: 70.1 %
PHOSPHATE SERPL-MCNC: 2.4 MG/DL
PLATELET # BLD AUTO: 333 K/UL
PMV BLD AUTO: 10 FL
POCT GLUCOSE: 152 MG/DL (ref 70–110)
POCT GLUCOSE: 207 MG/DL (ref 70–110)
POCT GLUCOSE: 213 MG/DL (ref 70–110)
POCT GLUCOSE: 244 MG/DL (ref 70–110)
POCT GLUCOSE: 311 MG/DL (ref 70–110)
POTASSIUM SERPL-SCNC: 3.8 MMOL/L
RBC # BLD AUTO: 4.33 M/UL
SODIUM SERPL-SCNC: 136 MMOL/L
WBC # BLD AUTO: 8.5 K/UL

## 2018-09-24 PROCEDURE — 85025 COMPLETE CBC W/AUTO DIFF WBC: CPT

## 2018-09-24 PROCEDURE — 37000009 HC ANESTHESIA EA ADD 15 MINS: Performed by: PODIATRIST

## 2018-09-24 PROCEDURE — 25000003 PHARM REV CODE 250: Performed by: INTERNAL MEDICINE

## 2018-09-24 PROCEDURE — 27605 INCISION OF ACHILLES TENDON: CPT | Mod: 58,51,LT, | Performed by: PODIATRIST

## 2018-09-24 PROCEDURE — 25000003 PHARM REV CODE 250: Performed by: NURSE PRACTITIONER

## 2018-09-24 PROCEDURE — 71000033 HC RECOVERY, INTIAL HOUR: Performed by: PODIATRIST

## 2018-09-24 PROCEDURE — 63600175 PHARM REV CODE 636 W HCPCS: Performed by: INTERNAL MEDICINE

## 2018-09-24 PROCEDURE — 84100 ASSAY OF PHOSPHORUS: CPT

## 2018-09-24 PROCEDURE — 37000008 HC ANESTHESIA 1ST 15 MINUTES: Performed by: PODIATRIST

## 2018-09-24 PROCEDURE — S0020 INJECTION, BUPIVICAINE HYDRO: HCPCS | Performed by: PODIATRIST

## 2018-09-24 PROCEDURE — 80048 BASIC METABOLIC PNL TOTAL CA: CPT

## 2018-09-24 PROCEDURE — 63600175 PHARM REV CODE 636 W HCPCS: Performed by: NURSE ANESTHETIST, CERTIFIED REGISTERED

## 2018-09-24 PROCEDURE — 0L8P0ZZ DIVISION OF LEFT LOWER LEG TENDON, OPEN APPROACH: ICD-10-PCS | Performed by: PODIATRIST

## 2018-09-24 PROCEDURE — 27201423 OPTIME MED/SURG SUP & DEVICES STERILE SUPPLY: Performed by: PODIATRIST

## 2018-09-24 PROCEDURE — 63600175 PHARM REV CODE 636 W HCPCS: Performed by: NURSE PRACTITIONER

## 2018-09-24 PROCEDURE — 21400001 HC TELEMETRY ROOM

## 2018-09-24 PROCEDURE — 25000003 PHARM REV CODE 250: Performed by: PODIATRIST

## 2018-09-24 PROCEDURE — 94640 AIRWAY INHALATION TREATMENT: CPT

## 2018-09-24 PROCEDURE — 36415 COLL VENOUS BLD VENIPUNCTURE: CPT

## 2018-09-24 PROCEDURE — 36000709 HC OR TIME LEV III EA ADD 15 MIN: Performed by: PODIATRIST

## 2018-09-24 PROCEDURE — 36000708 HC OR TIME LEV III 1ST 15 MIN: Performed by: PODIATRIST

## 2018-09-24 PROCEDURE — 25000242 PHARM REV CODE 250 ALT 637 W/ HCPCS: Performed by: NURSE PRACTITIONER

## 2018-09-24 PROCEDURE — 99233 SBSQ HOSP IP/OBS HIGH 50: CPT | Mod: ,,, | Performed by: INTERNAL MEDICINE

## 2018-09-24 PROCEDURE — 25000003 PHARM REV CODE 250: Performed by: NURSE ANESTHETIST, CERTIFIED REGISTERED

## 2018-09-24 PROCEDURE — 13160 SEC CLSR SURG WND/DEHSN XTN: CPT | Mod: 58,LT,, | Performed by: PODIATRIST

## 2018-09-24 RX ORDER — LIDOCAINE HYDROCHLORIDE 10 MG/ML
INJECTION INFILTRATION; PERINEURAL
Status: DISCONTINUED | OUTPATIENT
Start: 2018-09-24 | End: 2018-09-24

## 2018-09-24 RX ORDER — SODIUM CHLORIDE 0.9 % (FLUSH) 0.9 %
3 SYRINGE (ML) INJECTION EVERY 8 HOURS
Status: DISCONTINUED | OUTPATIENT
Start: 2018-09-24 | End: 2018-09-24 | Stop reason: HOSPADM

## 2018-09-24 RX ORDER — FENTANYL CITRATE 50 UG/ML
INJECTION, SOLUTION INTRAMUSCULAR; INTRAVENOUS
Status: DISCONTINUED | OUTPATIENT
Start: 2018-09-24 | End: 2018-09-24

## 2018-09-24 RX ORDER — MEPERIDINE HYDROCHLORIDE 50 MG/ML
12.5 INJECTION INTRAMUSCULAR; INTRAVENOUS; SUBCUTANEOUS ONCE AS NEEDED
Status: DISCONTINUED | OUTPATIENT
Start: 2018-09-24 | End: 2018-09-24 | Stop reason: HOSPADM

## 2018-09-24 RX ORDER — SODIUM CHLORIDE 0.9 % (FLUSH) 0.9 %
3 SYRINGE (ML) INJECTION
Status: DISCONTINUED | OUTPATIENT
Start: 2018-09-24 | End: 2018-09-24 | Stop reason: HOSPADM

## 2018-09-24 RX ORDER — LIDOCAINE HYDROCHLORIDE 10 MG/ML
INJECTION, SOLUTION EPIDURAL; INFILTRATION; INTRACAUDAL; PERINEURAL
Status: DISCONTINUED | OUTPATIENT
Start: 2018-09-24 | End: 2018-09-24 | Stop reason: HOSPADM

## 2018-09-24 RX ORDER — SODIUM CHLORIDE, SODIUM LACTATE, POTASSIUM CHLORIDE, CALCIUM CHLORIDE 600; 310; 30; 20 MG/100ML; MG/100ML; MG/100ML; MG/100ML
INJECTION, SOLUTION INTRAVENOUS CONTINUOUS PRN
Status: DISCONTINUED | OUTPATIENT
Start: 2018-09-24 | End: 2018-09-24

## 2018-09-24 RX ORDER — HYDROMORPHONE HYDROCHLORIDE 2 MG/ML
0.2 INJECTION, SOLUTION INTRAMUSCULAR; INTRAVENOUS; SUBCUTANEOUS EVERY 5 MIN PRN
Status: DISCONTINUED | OUTPATIENT
Start: 2018-09-24 | End: 2018-09-24 | Stop reason: HOSPADM

## 2018-09-24 RX ORDER — PROPOFOL 10 MG/ML
VIAL (ML) INTRAVENOUS
Status: DISCONTINUED | OUTPATIENT
Start: 2018-09-24 | End: 2018-09-24

## 2018-09-24 RX ORDER — BUPIVACAINE HYDROCHLORIDE 5 MG/ML
INJECTION, SOLUTION EPIDURAL; INTRACAUDAL
Status: DISCONTINUED | OUTPATIENT
Start: 2018-09-24 | End: 2018-09-24 | Stop reason: HOSPADM

## 2018-09-24 RX ORDER — ACETAMINOPHEN 10 MG/ML
1000 INJECTION, SOLUTION INTRAVENOUS ONCE
Status: DISCONTINUED | OUTPATIENT
Start: 2018-09-24 | End: 2018-09-24 | Stop reason: HOSPADM

## 2018-09-24 RX ADMIN — METOPROLOL TARTRATE 25 MG: 25 TABLET ORAL at 08:09

## 2018-09-24 RX ADMIN — PIPERACILLIN SODIUM AND TAZOBACTAM SODIUM 4.5 G: 4; .5 INJECTION, POWDER, LYOPHILIZED, FOR SOLUTION INTRAVENOUS at 05:09

## 2018-09-24 RX ADMIN — HYDROCODONE BITARTRATE AND ACETAMINOPHEN 1 TABLET: 10; 325 TABLET ORAL at 12:09

## 2018-09-24 RX ADMIN — MORPHINE SULFATE 3 MG: 4 INJECTION INTRAVENOUS at 05:09

## 2018-09-24 RX ADMIN — ASPIRIN 81 MG: 81 TABLET, COATED ORAL at 11:09

## 2018-09-24 RX ADMIN — PROPOFOL 100 MG: 10 INJECTION, EMULSION INTRAVENOUS at 07:09

## 2018-09-24 RX ADMIN — PROPOFOL 20 MG: 10 INJECTION, EMULSION INTRAVENOUS at 07:09

## 2018-09-24 RX ADMIN — SODIUM PHOSPHATE, MONOBASIC, MONOHYDRATE 30 MMOL: 276; 142 INJECTION, SOLUTION INTRAVENOUS at 05:09

## 2018-09-24 RX ADMIN — FENTANYL CITRATE 50 MCG: 50 INJECTION, SOLUTION INTRAMUSCULAR; INTRAVENOUS at 07:09

## 2018-09-24 RX ADMIN — ARFORMOTEROL TARTRATE 15 MCG: 15 SOLUTION RESPIRATORY (INHALATION) at 08:09

## 2018-09-24 RX ADMIN — PREDNISONE 5 MG: 5 TABLET ORAL at 11:09

## 2018-09-24 RX ADMIN — HYDROCODONE BITARTRATE AND ACETAMINOPHEN 1 TABLET: 10; 325 TABLET ORAL at 04:09

## 2018-09-24 RX ADMIN — SODIUM CHLORIDE, SODIUM LACTATE, POTASSIUM CHLORIDE, AND CALCIUM CHLORIDE: 600; 310; 30; 20 INJECTION, SOLUTION INTRAVENOUS at 06:09

## 2018-09-24 RX ADMIN — GABAPENTIN 100 MG: 100 CAPSULE ORAL at 11:09

## 2018-09-24 RX ADMIN — SODIUM BICARBONATE 650 MG TABLET 2600 MG: at 08:09

## 2018-09-24 RX ADMIN — SODIUM BICARBONATE 650 MG TABLET 2600 MG: at 11:09

## 2018-09-24 RX ADMIN — HYDROCODONE BITARTRATE AND ACETAMINOPHEN 1 TABLET: 10; 325 TABLET ORAL at 08:09

## 2018-09-24 RX ADMIN — PIPERACILLIN SODIUM AND TAZOBACTAM SODIUM 4.5 G: 4; .5 INJECTION, POWDER, LYOPHILIZED, FOR SOLUTION INTRAVENOUS at 01:09

## 2018-09-24 RX ADMIN — AMLODIPINE BESYLATE 5 MG: 5 TABLET ORAL at 10:09

## 2018-09-24 RX ADMIN — LIDOCAINE HYDROCHLORIDE 40 MG: 10 INJECTION, SOLUTION INFILTRATION; PERINEURAL at 07:09

## 2018-09-24 RX ADMIN — TACROLIMUS 1.5 MG: 1 CAPSULE ORAL at 11:09

## 2018-09-24 RX ADMIN — HYDROCODONE BITARTRATE AND ACETAMINOPHEN 1 TABLET: 10; 325 TABLET ORAL at 10:09

## 2018-09-24 RX ADMIN — PANTOPRAZOLE SODIUM 40 MG: 40 TABLET, DELAYED RELEASE ORAL at 10:09

## 2018-09-24 RX ADMIN — INSULIN ASPART 1 UNITS: 100 INJECTION, SOLUTION INTRAVENOUS; SUBCUTANEOUS at 10:09

## 2018-09-24 RX ADMIN — GABAPENTIN 100 MG: 100 CAPSULE ORAL at 09:09

## 2018-09-24 RX ADMIN — TACROLIMUS 1.5 MG: 1 CAPSULE ORAL at 05:09

## 2018-09-24 RX ADMIN — BUDESONIDE 0.5 MG: 0.5 SUSPENSION RESPIRATORY (INHALATION) at 08:09

## 2018-09-24 RX ADMIN — PROPOFOL 20 MG: 10 INJECTION, EMULSION INTRAVENOUS at 08:09

## 2018-09-24 RX ADMIN — OLANZAPINE 5 MG: 5 TABLET, ORALLY DISINTEGRATING ORAL at 09:09

## 2018-09-24 RX ADMIN — METOPROLOL TARTRATE 25 MG: 25 TABLET ORAL at 11:09

## 2018-09-24 RX ADMIN — PIPERACILLIN SODIUM AND TAZOBACTAM SODIUM 4.5 G: 4; .5 INJECTION, POWDER, LYOPHILIZED, FOR SOLUTION INTRAVENOUS at 07:09

## 2018-09-24 RX ADMIN — INSULIN ASPART 4 UNITS: 100 INJECTION, SOLUTION INTRAVENOUS; SUBCUTANEOUS at 06:09

## 2018-09-24 RX ADMIN — GABAPENTIN 100 MG: 100 CAPSULE ORAL at 04:09

## 2018-09-24 RX ADMIN — PROPOFOL 40 MG: 10 INJECTION, EMULSION INTRAVENOUS at 07:09

## 2018-09-24 RX ADMIN — PIPERACILLIN SODIUM AND TAZOBACTAM SODIUM 4.5 G: 4; .5 INJECTION, POWDER, LYOPHILIZED, FOR SOLUTION INTRAVENOUS at 11:09

## 2018-09-24 RX ADMIN — INSULIN DETEMIR 30 UNITS: 100 INJECTION, SOLUTION SUBCUTANEOUS at 09:09

## 2018-09-24 NOTE — ASSESSMENT & PLAN NOTE
9/22  Increase Levemir  NISS  Accuchecks    9/23  Improved  NISS  Accuchecks    9/24  Improved  NISS  Accuchecks

## 2018-09-24 NOTE — ASSESSMENT & PLAN NOTE
Dr. Estrada with renal consulted due to patient with transplant.  He reports patient can stay here  Renal consulted   Monitor rfp    9/22  IVF's  Nephrology     9/23  IVF's  Nephrology  Improved    9/24  Monitor  Nephrology

## 2018-09-24 NOTE — CONSULTS
Consult received for ltac services.  Discussed with Dr Proctor that [facility] to be contacted to evaluate for medical necessity.  Also discussed that [facility] [employer] does not have long term acute care benefits.      Contacted Nancy at [facility], referral discussed to evaluate for medical necessity .  Post Acute to call with determination.     Contacted [facility]  @ [phone]. Discussed benefits, Patient does not currently have long term acute care benefits.  Spoke with Swapna  at [facility] , she confirmed that patient does not have LTAC benefits. Call Ref# [id number].

## 2018-09-24 NOTE — PROGRESS NOTES
[facility] Hospital Medicine  Progress Note    Patient Name: Lavelle Ladd  MRN: [id number]  Patient Class: IP- Inpatient   Admission Date: 9/18/2018  Length of Stay: 6 days  Attending Physician: Chapin Proctor MD  Primary Care Provider: Rishabh Esteban MD        Subjective:     Principal Problem:Gas gangrene of foot    HPI:  HPI limited due to patient confusion and obtained from Er records.  Lavelle Ladd is a 63 y.o. male kidney transplant patient with hx of HTN, CAD, diastolic heart failure, Type 2 DM with diabetic neuropathy, ESRD, PVD, COPD who was brought to ED via EMS secondary to failure to thrive which has been present for an unknown amount of time. EMS reported that there was human and dog feces in patient's room and that patient does not want to take his medications. His CBG was 348. Patient with lower foot wound to left. Patient evalauted in ER. WBC 16.72, Na 127, Anion 18, Cr. 1.9, Trop 0.036, ,  CT renal:1. Negative for acute process involving the abdomen or pelvis. Negative for inflammatory changes. Normal appendix. Negative for renal stone disease or hydronephrosis, in this patient who has atrophic native kidneys and a right lower quadrant transplant kidney.2. Again seen is mild prominence of the transplant kidney pelvocaliceal system, without dilation of the ureter, likely a chronic finding.3. Mild distention of the gallbladder, which is otherwise normal, nonspecific finding.4. Fluid within the nondilated colon, which can be seen in patients with diarrhea or gastroenteritis. Clinical correlation is advised.5. Dependent atelectasis in the lung bases.6. Moderate sliding-type hiatal hernia with possible distal esophageal thickening. Does the patient have symptoms of esophagitis?7. Stable findings as noted above.  Chest Xray1. There is a mild amount of haziness in the lateral aspect of the base of the left hemithorax.  This is characteristic of atelectasis or subtle  pneumonia.2. The left costophrenic angle is blunted.  This is characteristic of a tiny pleural effusion.3. There is a mild amount of levoconvex curvature of the thoracic spine.4. There is mild cardiomegaly. Xray left foot:1.  Progressive erosive changes with bone loss involving the 1st distal phalanx concerning for osteomyelitis.2.  Air bubbles within the left 5th toe, concerning for possible infectious process or gangrene.  Clinical correlation is advised.3.  Progressive soft tissue loss involving the 4th toe, which could also indicate gangrene.  Clinical correlation is advised. Hosptial medicine consulted. Patient admitted    Hospital Course:  Patient admitted to Telemetry with gas gangrene of the left foot under the care of Hospital Medicine. Started on IV vancomycin and IV zosyn. Vascular surgery was consulted who recommended BKA. Podiatry was consulted who also recommended BKA but pt refuses, instead agreeing to TMA. BC grew staphylococcus aureus - sensitivities pending. ID consulted. Pt on IV vancomycin - Zosyn DC'd. Pt had TMA on 9/21. Surgical incision to be flushed twice daily and podiatry to close at later time, depending on clinical course (likely Monday)    9/22  Patient with some bleeding o/n due to ambulating to BR. Advised patient to remain non-weight bearing and to notify Nsg of assistance needs. Patient declining the need for SNF referral. PT/OT to work with patient. Plan for Wound Closure  On Monday per Podiatry. Patient denies cp / SOB. CM for Post acute needs Wheel [facility] / [facility] Follow up.     9/23  Patient improving steadily. No events. C/O rib pain / Foot pain. Plan for wound closure tomorrow.  CM on consult for home needs- patient refusing SNF placement    9/24  Patient s/p wound closure per podiatry. Discussed with clinical team. Patient plan for LTAC with 6 weeks of IV abx. CM on consult    Interval History: Patient s/p wound closure. Plan for Post Acute Care - CM consulted    Review of  Systems   Constitutional: Positive for activity change and fatigue. Negative for appetite change and fever.   HENT: Negative.  Negative for congestion, sinus pressure and sore throat.    Eyes: Negative.  Negative for photophobia, discharge and visual disturbance.   Respiratory: Negative.  Negative for cough, chest tightness, shortness of breath and wheezing.    Cardiovascular: Negative.  Negative for chest pain and palpitations.   Gastrointestinal: Negative.  Negative for abdominal pain, blood in stool, constipation, diarrhea, nausea and vomiting.   Endocrine: Negative.    Genitourinary: Negative.    Musculoskeletal: Positive for myalgias. Negative for neck pain and neck stiffness.   Skin: Positive for wound.   Allergic/Immunologic: Negative.    Neurological: Positive for weakness. Negative for seizures, syncope and headaches.   Hematological: Negative.    Psychiatric/Behavioral: Negative.  Negative for agitation, behavioral problems, hallucinations, self-injury and suicidal ideas.     Objective:     Vital Signs (Most Recent):  Temp: 98.7 °F (37.1 °C) (09/24/18 1120)  Pulse: 64 (09/24/18 1120)  Resp: 18 (09/24/18 1120)  BP: 131/75 (09/24/18 1120)  SpO2: (!) 93 % (09/24/18 1120) Vital Signs (24h Range):  Temp:  [97.8 °F (36.6 °C)-98.9 °F (37.2 °C)] 98.7 °F (37.1 °C)  Pulse:  [58-71] 64  Resp:  [16-20] 18  SpO2:  [88 %-95 %] 93 %  BP: (131-212)/() 131/75     Weight: 93 kg (205 lb 0.4 oz)  Body mass index is 28.6 kg/m².    Intake/Output Summary (Last 24 hours) at 9/24/2018 1204  Last data filed at 9/24/2018 1100  Gross per 24 hour   Intake 800 ml   Output 1115 ml   Net -315 ml      Physical Exam   Constitutional: He is oriented to person, place, and time. He appears well-developed and well-nourished.   HENT:   Head: Normocephalic and atraumatic.   Eyes: EOM are normal. Pupils are equal, round, and reactive to light.   Neck: Normal range of motion. Neck supple.   Cardiovascular: Normal rate, regular rhythm and  intact distal pulses.   Murmur heard.   Systolic murmur is present with a grade of 3/6.  Pulmonary/Chest: Effort normal and breath sounds normal. No respiratory distress. He has no wheezes.   Abdominal: Soft. Bowel sounds are normal.   Musculoskeletal: Normal range of motion. He exhibits no deformity.        Legs:  Feet:   Left Foot: amputated  Neurological: He is alert and oriented to person, place, and time. He has normal reflexes.   Skin: Skin is warm and dry. Capillary refill takes 2 to 3 seconds.   Psychiatric: He has a normal mood and affect. His behavior is normal. Judgment and thought content normal.   Nursing note and vitals reviewed.      Significant Labs:   Blood Culture: No results for input(s): LABBLOO in the last 48 hours.  BMP:   Recent Labs   Lab  09/24/18   0503   GLU  216*   NA  136   K  3.8   CL  101   CO2  23   BUN  25*   CREATININE  1.6*   CALCIUM  8.7     CBC:   Recent Labs   Lab  09/23/18   0553  09/24/18   0503   WBC  8.75  8.50   HGB  12.7*  12.5*   HCT  39.3*  38.7*   PLT  292  333     CMP:   Recent Labs   Lab  09/23/18   0553  09/24/18   0503   NA  138  136   K  3.6  3.8   CL  101  101   CO2  25  23   GLU  151*  216*   BUN  25*  25*   CREATININE  1.5*  1.6*   CALCIUM  9.1  8.7   ANIONGAP  12  12   EGFRNONAA  48*  45*     All pertinent labs within the past 24 hours have been reviewed.    Significant Imaging: I have reviewed all pertinent imaging results/findings within the past 24 hours.    Assessment/Plan:      * Gas gangrene of foot    Vanc/zosyn  Podiatry consult  Source of sepsis    --TMA scheduled for 9/21  --continue IV vanc, dc IV Zosyn  --BC + staph aureus  --ID consulted  --repeat BC drawn 9/21 9/22  ABX  Podiatry  S/p TMA  ID  Plan for Home Support - PICC / WC / [facility]  CM on Consult    9/23  ABX  Podiatry for wound closure tomorrow  ID on Consult  CM on Consult        Gangrene of left foot              Bacteremia    --BC grew STAPHYLOCOCCUS AUREUS, sensitivities pending    --continue IV Vancomycin  --ID consulted  --repeat bc drawn 9/21 9/22  ABX  ID  Monitor  PICC  Home IV abx infusion    9/23  ABX  ID on Consult  PICC     9/24  ABX  PICC  ID on Consult        Osteomyelitis of left foot    Vanc/zosyn  poditary consult   --TMA schedueled 9/21 9/22  Plan for wound closure on Monday  Podiatry  Plan for post acute care  Patient declining SNF placement  CM on consult    9/23  ABX  Wound Closure  ID on Consult  CM for Home needs    9/24  ABX per ID  S/p wound closure  CM for LTAC referral        Failure to thrive in adult    --registered dietitian following    9/22  Diet  Monitor      9/23  Nutrition  Refusing Post Acute Services - SNF  Will get H/H [facility]    9/24  Podiatry recommending LTAC  CM on Consult  Nutrition              Diabetes mellitus type 2, uncontrolled, with complications    Hgb A1c 8.0  --accuchecks and SSI  --Levemir 15U qd    9/22  Increase Levemir to 25 units    9/23  Improved control  Levemir to 28    9/24  Increase Levemir to 30  NISS  Accuchecks            Sepsis    Not severe sepsis. Patients kidney function baseline with kidney transplant and troponin elevation related to CKD.   Continue IV antx therapy with vanc and zosyn for suspected source of foot wound  --BC grew STAPHYLOCOCCUS AUREUS, sensitivities pending   --continue IV Vancomycin, DC IV Zosyn  --ID consulted  --repeat bc drawn 9/21  Monitor labs    9/22  Monitor BC  PICC  IV abx x 2 weeks  ID  [facility]    9/23  ABX  ID  Plan for possible PICC    9/24  ABX  ID  PICC placement        PVD (peripheral vascular disease)    --vascular and podiatry following  --TMA today (9/21)  --continue daily  ASA    9/23  NPO after MN for wound closure tomorrow per podiatry          Type 2 diabetes mellitus with retinopathy, with long-term current use of insulin    9/22  Increase Levemir  NISS  Accuchecks    9/23  Improved  NISS  Accuchecks    9/24  Improved  NISS  Accuchecks          Chronic kidney disease (CKD),  stage III (moderate)    Dr. Estrada with renal consulted due to patient with transplant.  He reports patient can stay here  Renal consulted   Monitor rfp      IVF's  Nephrology       IVF's  Nephrology  Improved      Monitor  Nephrology           donor kidney transplant for DM 16    --Nephrology following  --see above        Essential hypertension    Hold bp meds at this time due to sepsis   Monitor trends and restart once need    -sepsis status improving.  -Restart BP meds.  -Monitor VS      CCB  Monitor      Stable  Monitor          VTE Risk Mitigation (From admission, onward)        Ordered     Place GOPI hose  Until discontinued      18     Place sequential compression device  Until discontinued      18     IP VTE HIGH RISK PATIENT  Once      18              Chapin Proctor MD  Department of Hospital Medicine   [facility]

## 2018-09-24 NOTE — PROGRESS NOTES
[facility]   Nephrology  Progress Note    Patient Name: Lavelle Ladd  MRN: [id number]  Admission Date: 9/18/2018  Hospital Length of Stay: 6 days  Attending Provider: Chapin Proctor MD   Primary Care Physician: Rishabh Esteban MD  Principal Problem:Gas gangrene of foot    Subjective:     HPI: Patient is a 63-year-old male with type 1 diabetes with multiple complications including peripheral neuropathy and peripheral arterial disease.  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  Patient has been struggling with left foot wound and peripheral arterial disease along with osteomyelitis and gangrene of the left foot.  Patient is admitted with severe infection of the left foot with the possibility of needing surgical intervention.  Patient's baseline creatinine has been ranging between 1.6 and 1.9.  Patient's creatinine on admission is 1.9 and today is 1.8.  Patient is off and on confused and in bed social situation at home he was found to have feces all over himself.  Patient has an elderly mother at home as well with multiple social issues which are being addressed at this time.  Patient seen and examined in the hospital room bedside for renal transplant issues.  Patient has been on Prograf and prednisone.  No CellCept has been given to her at this time.    Interval History:     9/21/18: s/p left TMA today. Patient reports fatigue and mild abdominal pain (he had fall and fell on his abdomen, work-up was negative).     9/22/18: Patient states that he has been drinking plenty of fluids. He reports intermittent back pain.     9/23/18: Patient is feeling OK today. He mentions that he was diagnosed with AAA and request follow-up as outpatient.     9/24/18: patient had wound closure and tendoachilles lengthening performed today in am. Patient reports back/flank pain.             Review of patient's allergies indicates:  No Known Allergies  Current Facility-Administered Medications   Medication Frequency     acetaminophen tablet 650 mg Q4H PRN    amLODIPine tablet 5 mg Daily    arformoterol nebulizer solution 15 mcg Q12H    aspirin EC tablet 81 mg Daily    budesonide nebulizer solution 0.5 mg Q12H    dextrose 50% injection 12.5 g PRN    dextrose 50% injection 25 g PRN    gabapentin capsule 100 mg TID    glucagon (human recombinant) injection 1 mg PRN    glucose chewable tablet 16 g PRN    glucose chewable tablet 24 g PRN    hydrALAZINE injection 10 mg Q6H PRN    HYDROcodone-acetaminophen  mg per tablet 1 tablet Q4H PRN    insulin aspart U-100 pen 0-5 Units QID (AC + HS) PRN    insulin detemir U-100 pen 30 Units QHS    metoprolol tartrate (LOPRESSOR) tablet 25 mg BID    morphine injection 3 mg Q4H PRN    nozaseptin (NOZIN) nasal  BID    olanzapine zydis disintegrating tablet 5 mg QHS    ondansetron injection 4 mg Q8H PRN    pantoprazole EC tablet 40 mg Daily    piperacillin-tazobactam 4.5 g in dextrose 5 % 100 mL IVPB (ready to mix system) Q8H    predniSONE tablet 5 mg Daily    sodium bicarbonate tablet 2,600 mg BID    sodium chloride 0.9% flush 5 mL PRN    tacrolimus capsule 1.5 mg BID    traZODone tablet 50 mg Nightly PRN       Objective:     Vital Signs (Most Recent):  Temp: 98.7 °F (37.1 °C) (09/24/18 1120)  Pulse: 64 (09/24/18 1120)  Resp: 18 (09/24/18 1120)  BP: 131/75 (09/24/18 1120)  SpO2: (!) 93 % (09/24/18 1120)  O2 Device (Oxygen Therapy): room air (09/24/18 0915) Vital Signs (24h Range):  Temp:  [97.8 °F (36.6 °C)-98.9 °F (37.2 °C)] 98.7 °F (37.1 °C)  Pulse:  [58-71] 64  Resp:  [16-20] 18  SpO2:  [88 %-95 %] 93 %  BP: (131-212)/() 131/75     Weight: 93 kg (205 lb 0.4 oz) (09/21/18 1100)  Body mass index is 28.6 kg/m².  Body surface area is 2.16 meters squared.    I/O last 3 completed shifts:  In: 1200 [P.O.:1000; IV Piggyback:200]  Out: 2650 [Urine:2650]    Physical Exam   Constitutional: He is oriented to person, place, and time. He appears well-developed. He is  cooperative.   HENT:   Head: Normocephalic.   Nose: No rhinorrhea.   Mouth/Throat: Mucous membranes are normal. No oropharyngeal exudate.   Eyes: Pupils are equal, round, and reactive to light.   Neck: No thyroid mass present.   Cardiovascular: Normal rate, regular rhythm, S1 normal, S2 normal and intact distal pulses.   Pulmonary/Chest: Effort normal. No respiratory distress. He has no wheezes.   Abdominal: Soft. Bowel sounds are normal. He exhibits no distension. There is no tenderness. No hernia.   Musculoskeletal: Tenderness: lastpt.   S/p left TMA.    S/p right BKA.   Lymphadenopathy:     He has no cervical adenopathy.   Neurological: He is alert and oriented to person, place, and time.   Skin: Skin is warm and dry.   Psychiatric: He has a normal mood and affect.       Significant Labs:  Lab Results   Component Value Date    CREATININE 1.6 (H) 09/24/2018    BUN 25 (H) 09/24/2018     09/24/2018    K 3.8 09/24/2018     09/24/2018    CO2 23 09/24/2018     Lab Results   Component Value Date    .0 (H) 10/30/2017    CALCIUM 8.7 09/24/2018    CAION 1.10 05/30/2016    PHOS 2.4 (L) 09/24/2018     Lab Results   Component Value Date    ALBUMIN 2.5 (L) 09/18/2018     Lab Results   Component Value Date    WBC 8.50 09/24/2018    HGB 12.5 (L) 09/24/2018    HCT 38.7 (L) 09/24/2018    MCV 89 09/24/2018     09/24/2018     Recent Labs   Lab  09/18/18   1446   MG  1.9     Tacrolimus: 5.3 (9/19/18)      Significant Imaging:  Imaging Results          X-Ray Foot Complete Left (Final result)  Result time 09/18/18 19:50:43    Final result by Nicolas Sawyer MD (09/18/18 19:50:43)                 Impression:      1.  Progressive erosive changes with bone loss involving the 1st distal phalanx concerning for osteomyelitis.    2.  Air bubbles within the left 5th toe, concerning for possible infectious process or gangrene.  Clinical correlation is advised.    3.  Progressive soft tissue loss involving the 4th toe,  which could also indicate gangrene.  Clinical correlation is advised.    4.  Persistent healing fracture of the 2nd proximal phalanx.  Other stable findings as noted above.      Electronically signed by: Nicolas Sawyer MD  Date:    09/18/2018  Time:    19:50             Narrative:    EXAMINATION:  XR FOOT COMPLETE 3 VIEW LEFT    CLINICAL HISTORY:  Pain in left foot.  Gangrene, not elsewhere classified    TECHNIQUE:  AP, lateral and oblique views of the left foot were performed.    COMPARISON:  August 16, 2018    FINDINGS:  There is been interval bone loss involving the distal 1st phalanx, with overlying soft tissue defect.  Healing fracture of the 2nd proximal phalanx again seen.  There has been progressive soft tissue loss overlying the 4th toe and there are air bubbles in the 5th toe soft tissues.  No other bone loss is seen.    Plantar and Achilles calcaneal spurs.  Osteopenia.  Vascular calcifications.  Accessory ossicle adjacent to the navicular bone.                               CT Renal Stone Study ABD Pelvis WO (Final result)  Result time 09/18/18 18:31:38    Final result by Nicolas Sawyer MD (09/18/18 18:31:38)                 Impression:      1.  Negative for acute process involving the abdomen or pelvis.  Negative for inflammatory changes.  Normal appendix.  Negative for renal stone disease or hydronephrosis, in this patient who has atrophic native kidneys and a right lower quadrant transplant kidney.    2. Again seen is mild prominence of the transplant kidney pelvocaliceal system, without dilation of the ureter, likely a chronic finding.    3.  Mild distention of the gallbladder, which is otherwise normal, nonspecific finding.    4.  Fluid within the nondilated colon, which can be seen in patients with diarrhea or gastroenteritis.  Clinical correlation is advised.    5.  Dependent atelectasis in the lung bases.    6.  Moderate sliding-type hiatal hernia with possible distal esophageal thickening.  Does  the patient have symptoms of esophagitis?    7.  Stable findings as noted above.    All CT scans at this facility are performed  using dose modulation techniques as appropriate to performed exam including the following:  automated exposure control; adjustment of mA and/or kV according to the patients size (this includes techniques or standardized protocols for targeted exams where dose is matched to indication/reason for exam: i.e. extremities or head);  iterative reconstruction technique.      Electronically signed by: Nicolas Sawyer MD  Date:    09/18/2018  Time:    18:31             Narrative:    EXAMINATION:  CT RENAL STONE STUDY ABD PELVIS WO    CLINICAL HISTORY:  Flank pain, stone disease suspected;    TECHNIQUE:  Axial images through the abdomen and pelvis were obtained without the use of IV contrast.  Sagittal and coronal reconstructions are provided for review.  Oral contrast was not utilized.    COMPARISON:  December 11, 2016    FINDINGS:  LUNG BASES: Respiratory motion artifact is present on a number of images.  Subtle abnormalities could be overlooked.  Stable scarring or atelectasis in the inferior lingula and middle lobe.  Lung bases are otherwise clear.  Negative for pleural effusions.  There is a small amount of pericardial fluid.  Coronary artery calcifications.  Moderate size hiatal hernia with thickening of the distal esophagus.    ABDOMEN: The gallbladder is mildly distended.  No surrounding inflammatory changes or wall thickening is seen.  Bilateral perinephric stranding densities noted, with mild atrophy of both kidneys again seen.  Vascular calcifications within the spleen.  Again seen is mild prominence of the biliary tree measuring up to 8 mm.  The liver, spleen and gallbladder otherwise appear normal.  The pancreas is unremarkable.  Kidneys and adrenal glands are otherwise normal.  Negative for hydronephrosis or renal stone disease.  Significant vascular calcifications are present within the  renal pelves, with bilateral renal sinus lipomatosis again seen.    There remains to be a transplant kidney in the right anterior hemipelvis.  Mild prominence of the renal collecting system is again seen.  Negative for perinephric stranding densities.  Negative for definite renal stones.    Negative for adenopathy, free fluid or inflammatory change noted within the abdomen or pelvis.  Vascular calcifications are present without aneurysmal changes, with the aorta measuring a maximum of 2.9 cm.    The small bowel appears normal. Normal appendix.  There is fluid within the proximal and mid colon, without dilation or surrounding inflammatory changes.    PELVIS: The urinary bladder is mildly thick wall, a stable finding.  Prostate calcifications.  The   pelvic organs are breast of e-mail.  There are pelvic phleboliths.    No significant osseous abnormality is identified.  Negative for significant spinal canal stenosis. Similar degree of degenerative disc changes throughout the thoracic and lumbar spine, most significantly involving L3/L4.    Negative for groin adenopathy.    Postoperative changes involve the right pelvic abdominal wall from the transplant kidney placement.  The abdominal wall is intact.                               X-Ray Chest 1 View (Final result)  Result time 09/18/18 17:03:13    Final result by ANA CRISTINA Joseph Sr., MD (09/18/18 17:03:13)                 Impression:      1. There is a mild amount of haziness in the lateral aspect of the base of the left hemithorax.  This is characteristic of atelectasis or subtle pneumonia.  2. The left costophrenic angle is blunted.  This is characteristic of a tiny pleural effusion.  3. There is a mild amount of levoconvex curvature of the thoracic spine.  4. There is mild cardiomegaly.  .      Electronically signed by: Porter Joseph MD  Date:    09/18/2018  Time:    17:03             Narrative:    EXAMINATION:  XR CHEST 1 VIEW    CLINICAL HISTORY:  Adult failure  to thrive    COMPARISON:  2018    FINDINGS:  The size of the heart is prominent.  There is a mild amount of haziness in the lateral aspect of the base of the left hemithorax.  The right lung is clear.  There is no pneumothorax.  The left costophrenic angle is blunted.  The right costophrenic angle is sharp.  There is a mild amount of levoconvex curvature of the thoracic spine.                                  Assessment/Plan:     Hypophosphatemia    S/p IV Na-phosphate. P at 2.4 now. Monitor for now.         Gangrene of left foot    S/p left TMA on 18.     S/p wound closure and tendoachilles lengthening today in am.     Podiatry is following.         Osteomyelitis of left foot    Continue antibiotics.         Chronic kidney disease (CKD), stage III (moderate)    Patient with CKD 3. Creatinine has stabilized at 1.6.           donor kidney transplant for DM 14    Patient's renal function has stabilized with creatinine at 1.6.     Continue immunosuppression with Prograf, prednisone.     Will monitor closely with repeat renal panel.    Tacrolimus at goal.             Thank you for your consult. I will follow-up with patient. Please contact us if you have any additional questions.    Bud Tam MD  Nephrology  [facility]

## 2018-09-24 NOTE — ASSESSMENT & PLAN NOTE
--registered dietitian following    9/22  Diet  Monitor      9/23  Nutrition  Refusing Post Acute Services - SNF  Will get H/H [facility]    9/24  Podiatry recommending LTAC  CM on Consult  Nutrition

## 2018-09-24 NOTE — ASSESSMENT & PLAN NOTE
Patient's renal function has stabilized with creatinine at 1.6.     Continue immunosuppression with Prograf, prednisone.     Will monitor closely with repeat renal panel.    Tacrolimus at goal.

## 2018-09-24 NOTE — ASSESSMENT & PLAN NOTE
S/p left TMA on 9/21/18.     S/p wound closure and tendoachilles lengthening today in am.     Podiatry is following.

## 2018-09-24 NOTE — ASSESSMENT & PLAN NOTE
Hgb A1c 8.0  --accuchecks and SSI  --Levemir 15U qd    9/22  Increase Levemir to 25 units    9/23  Improved control  Levemir to 28    9/24  Increase Levemir to 30  NISS  Accuchecks

## 2018-09-24 NOTE — ASSESSMENT & PLAN NOTE
--BC grew STAPHYLOCOCCUS AUREUS, sensitivities pending   --continue IV Vancomycin  --ID consulted  --repeat bc drawn 9/21 9/22  ABX  ID  Monitor  PICC  Home IV abx infusion    9/23  ABX  ID on Consult  PICC     9/24  ABX  PICC  ID on Consult

## 2018-09-24 NOTE — PROGRESS NOTES
Pt s/p left TMA with secondary wound closure, DOS: 9/24/2018 by Dr. Wheeler  Anatomic wound closure obtained and posterior splint dressing applied  PT orders NWB left lower extremity  Although patient has declined, I reconsulted social work for discussion with patient for LTAC. Patient agrees to discuss again. High risk of loss of limb. Patient has poor living dispo, also needs close monitoring of wound site for infection and/or gangrenous changes that would require re-admission to hospital if not monitored closely  I recommend completion total IV antibiosis 6-8 wks (discuss with Dr. Veliz)    Future Appointments   Date Time Provider Department Center   10/3/2018 11:00 AM Karla Wheeler DPM [facility]   10/11/2018 11:30 AM Tex Guthrie DPM [facility]   10/18/2018 11:00 AM LASHONDA [facility]a [facility]

## 2018-09-24 NOTE — ASSESSMENT & PLAN NOTE
Not severe sepsis. Patients kidney function baseline with kidney transplant and troponin elevation related to CKD.   Continue IV antx therapy with vanc and zosyn for suspected source of foot wound  --BC grew STAPHYLOCOCCUS AUREUS, sensitivities pending   --continue IV Vancomycin, DC IV Zosyn  --ID consulted  --repeat bc drawn 9/21  Monitor labs    9/22  Monitor BC  PICC  IV abx x 2 weeks  ID  [facility]    9/23  ABX  ID  Plan for possible PICC    9/24  ABX  ID  PICC placement

## 2018-09-24 NOTE — PT/OT/SLP PROGRESS
Occupational Therapy      Patient Name:  Lavelle Ladd   MRN:  [id number]  ASSESSED OT THIS AM, NURSE MARIPOSA REPORTS PT JUST BACK FROM SX. AND IN TERRIBLE PAIN, WILL HOLD PER PT'S REQUEST AND ASSESS NEXT VISIT      Lola Quintero, OT  9/24/2018   1052

## 2018-09-24 NOTE — ANESTHESIA PREPROCEDURE EVALUATION
09/23/2018  Lavelle Ladd is a 63 y.o., male.    Anesthesia Evaluation    I have reviewed the Patient Summary Reports.    I have reviewed the Nursing Notes.      Review of Systems  Anesthesia Hx:  No problems with previous Anesthesia  Denies Family Hx of Anesthesia complications.   Denies Personal Hx of Anesthesia complications.   Social:  Former Smoker, Alcohol Use    Hematology/Oncology:  Hematology Normal   Oncology Normal     EENT/Dental:EENT/Dental Normal   Cardiovascular:   Hypertension CAD   PVD hyperlipidemia Diastolic heart failure  AAA?    Sepsis   Pulmonary:   COPD Asthma    Renal/:   Chronic Renal Disease, ESRD, renal hypertension    Hepatic/GI:   PUD, GERD Liver Disease, Hepatitis, C FTT   Musculoskeletal:   Arthritis  Gas gangrene and osteomyelitis of foot   Neurological:   Neuromuscular Disease,    Endocrine:   Diabetes    Dermatological:  Skin Normal    Psych:  Psychiatric Normal           Physical Exam  General:  Obesity       Chest/Lungs:  Chest/Lungs Findings: Clear to auscultation, Normal Respiratory Rate     Heart/Vascular:  Heart Findings: Rate: Normal  Rhythm: Regular Rhythm        Mental Status:  Mental Status Findings:  Cooperative, Alert and Oriented         Anesthesia Plan  Type of Anesthesia, risks & benefits discussed:  Anesthesia Type:  MAC  Patient's Preference:   Intra-op Monitoring Plan: standard ASA monitors  Intra-op Monitoring Plan Comments:   Post Op Pain Control Plan: IV/PO Opioids PRN  Post Op Pain Control Plan Comments:   Induction:   IV  Beta Blocker:         Informed Consent: Patient understands risks and agrees with Anesthesia plan.  Questions answered. Anesthesia consent signed with patient.  ASA Score: 4     Day of Surgery Review of History & Physical: I have interviewed and examined the patient. I have reviewed the patient's H&P dated:            Ready For  Surgery From Anesthesia Perspective.

## 2018-09-24 NOTE — ASSESSMENT & PLAN NOTE
Gunjan/zosyn  poditary consult   --TMA schedueled 9/21 9/22  Plan for wound closure on Monday  Podiatry  Plan for post acute care  Patient declining SNF placement  CM on consult    9/23  ABX  Wound Closure  ID on Consult  CM for Home needs    9/24  ABX per ID  S/p wound closure  CM for LTAC referral

## 2018-09-24 NOTE — ANESTHESIA POSTPROCEDURE EVALUATION
"Anesthesia Post Evaluation    Patient: Lavelle Ladd    Procedure(s) Performed: Procedure(s) (LRB):  Layered Extremity Wound Closure, Left   (Left)  LENGTHENING, TENDON, ACHILLES (Left)    Final Anesthesia Type: MAC  Patient location during evaluation: PACU  Patient participation: Yes- Able to Participate  Level of consciousness: awake and alert and oriented  Post-procedure vital signs: reviewed and stable  Pain management: adequate  Airway patency: patent  PONV status at discharge: No PONV  Anesthetic complications: no      Cardiovascular status: hemodynamically stable  Respiratory status: unassisted, room air and spontaneous ventilation  Hydration status: euvolemic  Follow-up not needed.        Visit Vitals  BP (!) 178/93 (BP Location: Right arm, Patient Position: Lying)   Pulse 65   Temp 37 °C (98.6 °F) (Oral)   Resp 18   Ht 5' 11" (1.803 m)   Wt 93 kg (205 lb 0.4 oz)   SpO2 (!) 93%   BMI 28.60 kg/m²       Pain/Shereen Score: Pain Assessment Performed: Yes (9/24/2018  9:10 AM)  Presence of Pain: denies (9/24/2018  9:10 AM)  Pain Rating Prior to Med Admin: 10 (9/24/2018 10:52 AM)  Pain Rating Post Med Admin: 0 (9/24/2018  1:45 AM)  Shereen Score: 10 (9/24/2018  9:10 AM)        "

## 2018-09-24 NOTE — SUBJECTIVE & OBJECTIVE
Interval History: Patient s/p wound closure. Plan for Post Acute Care - CM consulted    Review of Systems   Constitutional: Positive for activity change and fatigue. Negative for appetite change and fever.   HENT: Negative.  Negative for congestion, sinus pressure and sore throat.    Eyes: Negative.  Negative for photophobia, discharge and visual disturbance.   Respiratory: Negative.  Negative for cough, chest tightness, shortness of breath and wheezing.    Cardiovascular: Negative.  Negative for chest pain and palpitations.   Gastrointestinal: Negative.  Negative for abdominal pain, blood in stool, constipation, diarrhea, nausea and vomiting.   Endocrine: Negative.    Genitourinary: Negative.    Musculoskeletal: Positive for myalgias. Negative for neck pain and neck stiffness.   Skin: Positive for wound.   Allergic/Immunologic: Negative.    Neurological: Positive for weakness. Negative for seizures, syncope and headaches.   Hematological: Negative.    Psychiatric/Behavioral: Negative.  Negative for agitation, behavioral problems, hallucinations, self-injury and suicidal ideas.     Objective:     Vital Signs (Most Recent):  Temp: 98.7 °F (37.1 °C) (09/24/18 1120)  Pulse: 64 (09/24/18 1120)  Resp: 18 (09/24/18 1120)  BP: 131/75 (09/24/18 1120)  SpO2: (!) 93 % (09/24/18 1120) Vital Signs (24h Range):  Temp:  [97.8 °F (36.6 °C)-98.9 °F (37.2 °C)] 98.7 °F (37.1 °C)  Pulse:  [58-71] 64  Resp:  [16-20] 18  SpO2:  [88 %-95 %] 93 %  BP: (131-212)/() 131/75     Weight: 93 kg (205 lb 0.4 oz)  Body mass index is 28.6 kg/m².    Intake/Output Summary (Last 24 hours) at 9/24/2018 1204  Last data filed at 9/24/2018 1100  Gross per 24 hour   Intake 800 ml   Output 1115 ml   Net -315 ml      Physical Exam   Constitutional: He is oriented to person, place, and time. He appears well-developed and well-nourished.   HENT:   Head: Normocephalic and atraumatic.   Eyes: EOM are normal. Pupils are equal, round, and reactive to light.    Neck: Normal range of motion. Neck supple.   Cardiovascular: Normal rate, regular rhythm and intact distal pulses.   Murmur heard.   Systolic murmur is present with a grade of 3/6.  Pulmonary/Chest: Effort normal and breath sounds normal. No respiratory distress. He has no wheezes.   Abdominal: Soft. Bowel sounds are normal.   Musculoskeletal: Normal range of motion. He exhibits no deformity.        Legs:  Feet:   Left Foot: amputated  Neurological: He is alert and oriented to person, place, and time. He has normal reflexes.   Skin: Skin is warm and dry. Capillary refill takes 2 to 3 seconds.   Psychiatric: He has a normal mood and affect. His behavior is normal. Judgment and thought content normal.   Nursing note and vitals reviewed.      Significant Labs:   Blood Culture: No results for input(s): LABBLOO in the last 48 hours.  BMP:   Recent Labs   Lab  09/24/18   0503   GLU  216*   NA  136   K  3.8   CL  101   CO2  23   BUN  25*   CREATININE  1.6*   CALCIUM  8.7     CBC:   Recent Labs   Lab  09/23/18   0553  09/24/18   0503   WBC  8.75  8.50   HGB  12.7*  12.5*   HCT  39.3*  38.7*   PLT  292  333     CMP:   Recent Labs   Lab  09/23/18   0553  09/24/18   0503   NA  138  136   K  3.6  3.8   CL  101  101   CO2  25  23   GLU  151*  216*   BUN  25*  25*   CREATININE  1.5*  1.6*   CALCIUM  9.1  8.7   ANIONGAP  12  12   EGFRNONAA  48*  45*     All pertinent labs within the past 24 hours have been reviewed.    Significant Imaging: I have reviewed all pertinent imaging results/findings within the past 24 hours.

## 2018-09-24 NOTE — PT/OT/SLP PROGRESS
Physical Therapy      Patient Name:  Lavelle Ladd   MRN:  [id number]    ASSESSED PT THIS AM, NURSE MARIPOSA REPORTS PT JUST BACK FROM SX. AND IN TERRIBLE PAIN, WILL HOLD PER PT'S REQUEST AND ASSESS NEXT VISIT    Kavita Arango, PT   9/24/2018  1050

## 2018-09-24 NOTE — OP NOTE
[facility]  LONG OPERATIVE NOTE    Date: 9/24/2018   Patient: Lavelle Ladd   Medical Record Number: [id number]   Surgeon: Karla Wheeler DPM   Assistant: None  Preoperative Diagnosis: Left foot gangrene   Postoperative Diagnosis: Left foot gangrene  Surgical Procedure(s): 1. Secondary Wound closure, extensive, Left foot, 2. Left tendoachilles lengthening  Pathology: none sent  Anesthesia: MAC with local injection of 20 ccs of 1:1 mixture 0.5% marcaine plain and 1% lidocaine plain  Hemostasis:none  Estimated blood loss: 15mL  Materials:   1.2-0 nylon sutures, 3-0 nylon sutures   Implants:none   Injectables: none   Complications: None  Condition: Stable    Indications for Surgery:   Lavelle Ladd is a 63 y.o. male who was admitted to the hospital  for left foot infection complicated with gangrene of multiple digits. He underwent open TMA on 9/21/18 . He has consented to proceed with wound closure of left foot. Potential risks and complications were discussed with the patient in detail. The patient voiced understanding of such. The patient elected to undergo surgical treatment to aid in treatment of infection and accepts risks and possible complications. Preoperative history and physical were reviewed. There were no obvious contraindications noted to the surgical procedure. All the patient's questions were answered. Absolutely no guarantees were given or implied.     The patient was brought to operating room and placed on table in supine position.  A time out was performed to confirm correct patient identification and surgical procedure.   IV sedation was performed by the CRNA/Anesthesiologist. A local injection along achilles tendon was injected with 3 ccs of 1:1 mixture of 0.5% marcaine plain and 1% lidocaine plain was infiltrated into the surgical site. An ankle block was then performed with 17 ccs of 1:1 mixture of 0.5% marcaine plain and 1% lidocaine plain. The operative foot was then scrubbed,  prepped and draped in usual sterile manner.       Tendoachilles lengthening, Left Lower extremity  Attention was then directed to the posterior aspect of the patients left Achilles tendon where 3 incisions were made approximately 3 cm from one another in a transverse fashion. The most distal and the most proximal of the 3 incisions were made along the medial border of the Achilles tendon while the center incision was made along the lateral border of the Achilles tendon at the midpoint between the 2 medial incisions. The incisions were deepened with a #15 blade directly down to the subcutaneous layer and into the paratenon, into the Achilles tendon creating a hemisection. Active doriflexion of the ankle joint with supination of the forefoot was able to increase the palpable dells made from the hemisection. Lengthening of the Achilles tendon was performed to increase the available dorsiflexion of the ankle past perpendicular with the knee; with this procedure, the patient was able to achieve approximately 5 degrees of dorsiflexion from neutral with knee fully extended. Negative Castanedas test insured an intact Achilles. The wounds were then copiously irrigated with normal saline solution. The skin margins were then reapproximated maintaining physiological normal tension with simple interrupted suture with 4-0 Nylon.      Secondary Wound Closure, Left Foot    Attention was directed to the open amputation site of the left foot. The wound was inspected and the minimal overlying necrotic or non viable tissue was debrided. The foot was milked and no remaining purulent drainage was expressed.  No deep pockets were noted.  The wound was copiously irrigated with pulse lavage using 3 Liters of normal saline.  Any bleeders were cauterized as needed.  Adequate bleeding tissue was noted with granular base.  The amputation flaps were then reconstructed with care to remove any non viable tissue and/or excess skin flaps for  optimal closure. There was adequate tissue preserved on both plantar and dorsal flaps. The skin was reapproximated without tension with a series of interrupted 2-0 nylon sutures first and then 3-0 Nylon sutures.  The wound was dressed with betadine ointment, betadine impregnated adaptic and dry sterile dressing. A posterior splint was then applied to the lower extremity. Patient tolerated procedure and anesthesia well.  He was transferred to recovery room with vital signs stable and vascular status to the pre operative level.      The patient tolerated procedure and anesthesia well and was transferred to recovery room with vital signs stable and vascular status to pre operative level.    Report Electronically Signed By:     Karla Wheeler DPM   Podiatry  [facility]  9/24/2018

## 2018-09-24 NOTE — SUBJECTIVE & OBJECTIVE
Interval History:     9/21/18: s/p left TMA today. Patient reports fatigue and mild abdominal pain (he had fall and fell on his abdomen, work-up was negative).     9/22/18: Patient states that he has been drinking plenty of fluids. He reports intermittent back pain.     9/23/18: Patient is feeling OK today. He mentions that he was diagnosed with AAA and request follow-up as outpatient.     9/24/18: patient had wound closure and tendoachilles lengthening performed today in am. Patient reports back/flank pain.             Review of patient's allergies indicates:  No Known Allergies  Current Facility-Administered Medications   Medication Frequency    acetaminophen tablet 650 mg Q4H PRN    amLODIPine tablet 5 mg Daily    arformoterol nebulizer solution 15 mcg Q12H    aspirin EC tablet 81 mg Daily    budesonide nebulizer solution 0.5 mg Q12H    dextrose 50% injection 12.5 g PRN    dextrose 50% injection 25 g PRN    gabapentin capsule 100 mg TID    glucagon (human recombinant) injection 1 mg PRN    glucose chewable tablet 16 g PRN    glucose chewable tablet 24 g PRN    hydrALAZINE injection 10 mg Q6H PRN    HYDROcodone-acetaminophen  mg per tablet 1 tablet Q4H PRN    insulin aspart U-100 pen 0-5 Units QID (AC + HS) PRN    insulin detemir U-100 pen 30 Units QHS    metoprolol tartrate (LOPRESSOR) tablet 25 mg BID    morphine injection 3 mg Q4H PRN    nozaseptin (NOZIN) nasal  BID    olanzapine zydis disintegrating tablet 5 mg QHS    ondansetron injection 4 mg Q8H PRN    pantoprazole EC tablet 40 mg Daily    piperacillin-tazobactam 4.5 g in dextrose 5 % 100 mL IVPB (ready to mix system) Q8H    predniSONE tablet 5 mg Daily    sodium bicarbonate tablet 2,600 mg BID    sodium chloride 0.9% flush 5 mL PRN    tacrolimus capsule 1.5 mg BID    traZODone tablet 50 mg Nightly PRN       Objective:     Vital Signs (Most Recent):  Temp: 98.7 °F (37.1 °C) (09/24/18 1120)  Pulse: 64 (09/24/18  1120)  Resp: 18 (09/24/18 1120)  BP: 131/75 (09/24/18 1120)  SpO2: (!) 93 % (09/24/18 1120)  O2 Device (Oxygen Therapy): room air (09/24/18 0915) Vital Signs (24h Range):  Temp:  [97.8 °F (36.6 °C)-98.9 °F (37.2 °C)] 98.7 °F (37.1 °C)  Pulse:  [58-71] 64  Resp:  [16-20] 18  SpO2:  [88 %-95 %] 93 %  BP: (131-212)/() 131/75     Weight: 93 kg (205 lb 0.4 oz) (09/21/18 1100)  Body mass index is 28.6 kg/m².  Body surface area is 2.16 meters squared.    I/O last 3 completed shifts:  In: 1200 [P.O.:1000; IV Piggyback:200]  Out: 2650 [Urine:2650]    Physical Exam   Constitutional: He is oriented to person, place, and time. He appears well-developed. He is cooperative.   HENT:   Head: Normocephalic.   Nose: No rhinorrhea.   Mouth/Throat: Mucous membranes are normal. No oropharyngeal exudate.   Eyes: Pupils are equal, round, and reactive to light.   Neck: No thyroid mass present.   Cardiovascular: Normal rate, regular rhythm, S1 normal, S2 normal and intact distal pulses.   Pulmonary/Chest: Effort normal. No respiratory distress. He has no wheezes.   Abdominal: Soft. Bowel sounds are normal. He exhibits no distension. There is no tenderness. No hernia.   Musculoskeletal: Tenderness: lastpt.   S/p left TMA.    S/p right BKA.   Lymphadenopathy:     He has no cervical adenopathy.   Neurological: He is alert and oriented to person, place, and time.   Skin: Skin is warm and dry.   Psychiatric: He has a normal mood and affect.       Significant Labs:  Lab Results   Component Value Date    CREATININE 1.6 (H) 09/24/2018    BUN 25 (H) 09/24/2018     09/24/2018    K 3.8 09/24/2018     09/24/2018    CO2 23 09/24/2018     Lab Results   Component Value Date    .0 (H) 10/30/2017    CALCIUM 8.7 09/24/2018    CAION 1.10 05/30/2016    PHOS 2.4 (L) 09/24/2018     Lab Results   Component Value Date    ALBUMIN 2.5 (L) 09/18/2018     Lab Results   Component Value Date    WBC 8.50 09/24/2018    HGB 12.5 (L) 09/24/2018     HCT 38.7 (L) 09/24/2018    MCV 89 09/24/2018     09/24/2018     Recent Labs   Lab  09/18/18   1446   MG  1.9     Tacrolimus: 5.3 (9/19/18)      Significant Imaging:  Imaging Results          X-Ray Foot Complete Left (Final result)  Result time 09/18/18 19:50:43    Final result by Nicolas Sawyer MD (09/18/18 19:50:43)                 Impression:      1.  Progressive erosive changes with bone loss involving the 1st distal phalanx concerning for osteomyelitis.    2.  Air bubbles within the left 5th toe, concerning for possible infectious process or gangrene.  Clinical correlation is advised.    3.  Progressive soft tissue loss involving the 4th toe, which could also indicate gangrene.  Clinical correlation is advised.    4.  Persistent healing fracture of the 2nd proximal phalanx.  Other stable findings as noted above.      Electronically signed by: Nicolas Sawyer MD  Date:    09/18/2018  Time:    19:50             Narrative:    EXAMINATION:  XR FOOT COMPLETE 3 VIEW LEFT    CLINICAL HISTORY:  Pain in left foot.  Gangrene, not elsewhere classified    TECHNIQUE:  AP, lateral and oblique views of the left foot were performed.    COMPARISON:  August 16, 2018    FINDINGS:  There is been interval bone loss involving the distal 1st phalanx, with overlying soft tissue defect.  Healing fracture of the 2nd proximal phalanx again seen.  There has been progressive soft tissue loss overlying the 4th toe and there are air bubbles in the 5th toe soft tissues.  No other bone loss is seen.    Plantar and Achilles calcaneal spurs.  Osteopenia.  Vascular calcifications.  Accessory ossicle adjacent to the navicular bone.                               CT Renal Stone Study ABD Pelvis WO (Final result)  Result time 09/18/18 18:31:38    Final result by Nicolas Sawyer MD (09/18/18 18:31:38)                 Impression:      1.  Negative for acute process involving the abdomen or pelvis.  Negative for inflammatory changes.  Normal  appendix.  Negative for renal stone disease or hydronephrosis, in this patient who has atrophic native kidneys and a right lower quadrant transplant kidney.    2. Again seen is mild prominence of the transplant kidney pelvocaliceal system, without dilation of the ureter, likely a chronic finding.    3.  Mild distention of the gallbladder, which is otherwise normal, nonspecific finding.    4.  Fluid within the nondilated colon, which can be seen in patients with diarrhea or gastroenteritis.  Clinical correlation is advised.    5.  Dependent atelectasis in the lung bases.    6.  Moderate sliding-type hiatal hernia with possible distal esophageal thickening.  Does the patient have symptoms of esophagitis?    7.  Stable findings as noted above.    All CT scans at this facility are performed  using dose modulation techniques as appropriate to performed exam including the following:  automated exposure control; adjustment of mA and/or kV according to the patients size (this includes techniques or standardized protocols for targeted exams where dose is matched to indication/reason for exam: i.e. extremities or head);  iterative reconstruction technique.      Electronically signed by: Nicolas Sawyer MD  Date:    09/18/2018  Time:    18:31             Narrative:    EXAMINATION:  CT RENAL STONE STUDY ABD PELVIS WO    CLINICAL HISTORY:  Flank pain, stone disease suspected;    TECHNIQUE:  Axial images through the abdomen and pelvis were obtained without the use of IV contrast.  Sagittal and coronal reconstructions are provided for review.  Oral contrast was not utilized.    COMPARISON:  December 11, 2016    FINDINGS:  LUNG BASES: Respiratory motion artifact is present on a number of images.  Subtle abnormalities could be overlooked.  Stable scarring or atelectasis in the inferior lingula and middle lobe.  Lung bases are otherwise clear.  Negative for pleural effusions.  There is a small amount of pericardial fluid.  Coronary  artery calcifications.  Moderate size hiatal hernia with thickening of the distal esophagus.    ABDOMEN: The gallbladder is mildly distended.  No surrounding inflammatory changes or wall thickening is seen.  Bilateral perinephric stranding densities noted, with mild atrophy of both kidneys again seen.  Vascular calcifications within the spleen.  Again seen is mild prominence of the biliary tree measuring up to 8 mm.  The liver, spleen and gallbladder otherwise appear normal.  The pancreas is unremarkable.  Kidneys and adrenal glands are otherwise normal.  Negative for hydronephrosis or renal stone disease.  Significant vascular calcifications are present within the renal pelves, with bilateral renal sinus lipomatosis again seen.    There remains to be a transplant kidney in the right anterior hemipelvis.  Mild prominence of the renal collecting system is again seen.  Negative for perinephric stranding densities.  Negative for definite renal stones.    Negative for adenopathy, free fluid or inflammatory change noted within the abdomen or pelvis.  Vascular calcifications are present without aneurysmal changes, with the aorta measuring a maximum of 2.9 cm.    The small bowel appears normal. Normal appendix.  There is fluid within the proximal and mid colon, without dilation or surrounding inflammatory changes.    PELVIS: The urinary bladder is mildly thick wall, a stable finding.  Prostate calcifications.  The   pelvic organs are breast of e-mail.  There are pelvic phleboliths.    No significant osseous abnormality is identified.  Negative for significant spinal canal stenosis. Similar degree of degenerative disc changes throughout the thoracic and lumbar spine, most significantly involving L3/L4.    Negative for groin adenopathy.    Postoperative changes involve the right pelvic abdominal wall from the transplant kidney placement.  The abdominal wall is intact.                               X-Ray Chest 1 View (Final  result)  Result time 09/18/18 17:03:13    Final result by ANA CRISTINA Joseph Sr., MD (09/18/18 17:03:13)                 Impression:      1. There is a mild amount of haziness in the lateral aspect of the base of the left hemithorax.  This is characteristic of atelectasis or subtle pneumonia.  2. The left costophrenic angle is blunted.  This is characteristic of a tiny pleural effusion.  3. There is a mild amount of levoconvex curvature of the thoracic spine.  4. There is mild cardiomegaly.  .      Electronically signed by: Porter Joseph MD  Date:    09/18/2018  Time:    17:03             Narrative:    EXAMINATION:  XR CHEST 1 VIEW    CLINICAL HISTORY:  Adult failure to thrive    COMPARISON:  07/20/2018    FINDINGS:  The size of the heart is prominent.  There is a mild amount of haziness in the lateral aspect of the base of the left hemithorax.  The right lung is clear.  There is no pneumothorax.  The left costophrenic angle is blunted.  The right costophrenic angle is sharp.  There is a mild amount of levoconvex curvature of the thoracic spine.

## 2018-09-24 NOTE — TRANSFER OF CARE
"Anesthesia Transfer of Care Note    Patient: Lavelle Ladd    Procedure(s) Performed: Procedure(s) (LRB):  Layered Extremity Wound Closure, Left   (Left)  LENGTHENING, TENDON, ACHILLES (Left)    Patient location: PACU    Anesthesia Type: MAC    Transport from OR: Transported from OR on room air with adequate spontaneous ventilation    Post pain: adequate analgesia    Post assessment: no apparent anesthetic complications    Post vital signs: stable    Level of consciousness: awake    Complications: none    Transfer of care protocol was followed      Last vitals:   Visit Vitals  BP (!) 178/79 (Patient Position: Lying)   Pulse (!) 59   Temp 37 °C (98.6 °F) (Oral)   Resp 18   Ht 5' 11" (1.803 m)   Wt 93 kg (205 lb 0.4 oz)   SpO2 95%   BMI 28.60 kg/m²     "

## 2018-09-25 PROBLEM — A48.0 GAS GANGRENE OF FOOT: Status: RESOLVED | Noted: 2018-09-19 | Resolved: 2018-09-25

## 2018-09-25 PROBLEM — A41.9 SEPSIS: Status: RESOLVED | Noted: 2018-09-18 | Resolved: 2018-09-25

## 2018-09-25 PROBLEM — R78.81 BACTEREMIA: Status: RESOLVED | Noted: 2018-09-20 | Resolved: 2018-09-25

## 2018-09-25 PROBLEM — A48.0 GAS GANGRENE: Status: RESOLVED | Noted: 2018-09-24 | Resolved: 2018-09-25

## 2018-09-25 LAB
ANION GAP SERPL CALC-SCNC: 13 MMOL/L
BASOPHILS # BLD AUTO: 0.02 K/UL
BASOPHILS NFR BLD: 0.2 %
BUN SERPL-MCNC: 23 MG/DL
CALCIUM SERPL-MCNC: 8.8 MG/DL
CHLORIDE SERPL-SCNC: 100 MMOL/L
CO2 SERPL-SCNC: 22 MMOL/L
CREAT SERPL-MCNC: 1.7 MG/DL
DIFFERENTIAL METHOD: ABNORMAL
EOSINOPHIL # BLD AUTO: 0.1 K/UL
EOSINOPHIL NFR BLD: 1.5 %
ERYTHROCYTE [DISTWIDTH] IN BLOOD BY AUTOMATED COUNT: 14.1 %
EST. GFR  (AFRICAN AMERICAN): 48 ML/MIN/1.73 M^2
EST. GFR  (NON AFRICAN AMERICAN): 41 ML/MIN/1.73 M^2
GLUCOSE SERPL-MCNC: 167 MG/DL
HCT VFR BLD AUTO: 37.9 %
HGB BLD-MCNC: 12.2 G/DL
LYMPHOCYTES # BLD AUTO: 1.6 K/UL
LYMPHOCYTES NFR BLD: 20.3 %
MCH RBC QN AUTO: 28.6 PG
MCHC RBC AUTO-ENTMCNC: 32.2 G/DL
MCV RBC AUTO: 89 FL
MONOCYTES # BLD AUTO: 1 K/UL
MONOCYTES NFR BLD: 12.2 %
NEUTROPHILS # BLD AUTO: 5.3 K/UL
NEUTROPHILS NFR BLD: 65.8 %
PHOSPHATE SERPL-MCNC: 2.8 MG/DL
PLATELET # BLD AUTO: 348 K/UL
PMV BLD AUTO: 10.2 FL
POCT GLUCOSE: 153 MG/DL (ref 70–110)
POCT GLUCOSE: 213 MG/DL (ref 70–110)
POCT GLUCOSE: 237 MG/DL (ref 70–110)
POTASSIUM SERPL-SCNC: 3.9 MMOL/L
RBC # BLD AUTO: 4.26 M/UL
SODIUM SERPL-SCNC: 135 MMOL/L
WBC # BLD AUTO: 8.03 K/UL

## 2018-09-25 PROCEDURE — 63600175 PHARM REV CODE 636 W HCPCS: Performed by: INTERNAL MEDICINE

## 2018-09-25 PROCEDURE — 99232 SBSQ HOSP IP/OBS MODERATE 35: CPT | Mod: ,,, | Performed by: INTERNAL MEDICINE

## 2018-09-25 PROCEDURE — 36415 COLL VENOUS BLD VENIPUNCTURE: CPT

## 2018-09-25 PROCEDURE — 25000242 PHARM REV CODE 250 ALT 637 W/ HCPCS: Performed by: NURSE PRACTITIONER

## 2018-09-25 PROCEDURE — 25000003 PHARM REV CODE 250: Performed by: NURSE PRACTITIONER

## 2018-09-25 PROCEDURE — 84100 ASSAY OF PHOSPHORUS: CPT

## 2018-09-25 PROCEDURE — 63600175 PHARM REV CODE 636 W HCPCS: Performed by: NURSE PRACTITIONER

## 2018-09-25 PROCEDURE — 25000003 PHARM REV CODE 250: Performed by: INTERNAL MEDICINE

## 2018-09-25 PROCEDURE — 80048 BASIC METABOLIC PNL TOTAL CA: CPT

## 2018-09-25 PROCEDURE — C1751 CATH, INF, PER/CENT/MIDLINE: HCPCS

## 2018-09-25 PROCEDURE — 97803 MED NUTRITION INDIV SUBSEQ: CPT

## 2018-09-25 PROCEDURE — 94640 AIRWAY INHALATION TREATMENT: CPT

## 2018-09-25 PROCEDURE — 21400001 HC TELEMETRY ROOM

## 2018-09-25 PROCEDURE — 97530 THERAPEUTIC ACTIVITIES: CPT

## 2018-09-25 PROCEDURE — 94761 N-INVAS EAR/PLS OXIMETRY MLT: CPT

## 2018-09-25 PROCEDURE — 36569 INSJ PICC 5 YR+ W/O IMAGING: CPT

## 2018-09-25 PROCEDURE — 02HV33Z INSERTION OF INFUSION DEVICE INTO SUPERIOR VENA CAVA, PERCUTANEOUS APPROACH: ICD-10-PCS | Performed by: INTERNAL MEDICINE

## 2018-09-25 PROCEDURE — 85025 COMPLETE CBC W/AUTO DIFF WBC: CPT

## 2018-09-25 RX ORDER — METOPROLOL TARTRATE 25 MG/1
25 TABLET, FILM COATED ORAL 2 TIMES DAILY
Qty: 60 TABLET | Refills: 11 | Status: SHIPPED | OUTPATIENT
Start: 2018-09-25 | End: 2019-11-21 | Stop reason: SDUPTHER

## 2018-09-25 RX ORDER — CEFAZOLIN SODIUM 2 G/50ML
2 SOLUTION INTRAVENOUS
Status: DISCONTINUED | OUTPATIENT
Start: 2018-09-25 | End: 2018-09-26 | Stop reason: HOSPADM

## 2018-09-25 RX ORDER — HYDROCODONE BITARTRATE AND ACETAMINOPHEN 10; 325 MG/1; MG/1
1 TABLET ORAL EVERY 4 HOURS PRN
Qty: 30 TABLET | Refills: 0 | Status: SHIPPED | OUTPATIENT
Start: 2018-09-25 | End: 2018-10-18 | Stop reason: SDUPTHER

## 2018-09-25 RX ORDER — CEFAZOLIN SODIUM 2 G/50ML
2000 SOLUTION INTRAVENOUS EVERY 8 HOURS
Qty: 2100 ML | Refills: 0 | Status: SHIPPED | OUTPATIENT
Start: 2018-09-25 | End: 2018-10-09

## 2018-09-25 RX ADMIN — CEFAZOLIN SODIUM 2 G: 2 SOLUTION INTRAVENOUS at 09:09

## 2018-09-25 RX ADMIN — TACROLIMUS 1.5 MG: 1 CAPSULE ORAL at 05:09

## 2018-09-25 RX ADMIN — CEFAZOLIN SODIUM 2 G: 2 SOLUTION INTRAVENOUS at 04:09

## 2018-09-25 RX ADMIN — ONDANSETRON 4 MG: 2 INJECTION INTRAMUSCULAR; INTRAVENOUS at 10:09

## 2018-09-25 RX ADMIN — MORPHINE SULFATE 3 MG: 4 INJECTION INTRAVENOUS at 10:09

## 2018-09-25 RX ADMIN — TACROLIMUS 1.5 MG: 1 CAPSULE ORAL at 09:09

## 2018-09-25 RX ADMIN — GABAPENTIN 100 MG: 100 CAPSULE ORAL at 02:09

## 2018-09-25 RX ADMIN — METOPROLOL TARTRATE 25 MG: 25 TABLET ORAL at 08:09

## 2018-09-25 RX ADMIN — SODIUM BICARBONATE 650 MG TABLET 2600 MG: at 08:09

## 2018-09-25 RX ADMIN — MORPHINE SULFATE 3 MG: 4 INJECTION INTRAVENOUS at 05:09

## 2018-09-25 RX ADMIN — HYDROCODONE BITARTRATE AND ACETAMINOPHEN 1 TABLET: 10; 325 TABLET ORAL at 07:09

## 2018-09-25 RX ADMIN — PANTOPRAZOLE SODIUM 40 MG: 40 TABLET, DELAYED RELEASE ORAL at 09:09

## 2018-09-25 RX ADMIN — HYDROCODONE BITARTRATE AND ACETAMINOPHEN 1 TABLET: 10; 325 TABLET ORAL at 02:09

## 2018-09-25 RX ADMIN — ARFORMOTEROL TARTRATE 15 MCG: 15 SOLUTION RESPIRATORY (INHALATION) at 07:09

## 2018-09-25 RX ADMIN — HYDROCODONE BITARTRATE AND ACETAMINOPHEN 1 TABLET: 10; 325 TABLET ORAL at 06:09

## 2018-09-25 RX ADMIN — INSULIN DETEMIR 30 UNITS: 100 INJECTION, SOLUTION SUBCUTANEOUS at 08:09

## 2018-09-25 RX ADMIN — AMLODIPINE BESYLATE 5 MG: 5 TABLET ORAL at 09:09

## 2018-09-25 RX ADMIN — GABAPENTIN 100 MG: 100 CAPSULE ORAL at 09:09

## 2018-09-25 RX ADMIN — MORPHINE SULFATE 3 MG: 4 INJECTION INTRAVENOUS at 03:09

## 2018-09-25 RX ADMIN — INSULIN ASPART 1 UNITS: 100 INJECTION, SOLUTION INTRAVENOUS; SUBCUTANEOUS at 08:09

## 2018-09-25 RX ADMIN — MORPHINE SULFATE 3 MG: 4 INJECTION INTRAVENOUS at 09:09

## 2018-09-25 RX ADMIN — GABAPENTIN 100 MG: 100 CAPSULE ORAL at 08:09

## 2018-09-25 RX ADMIN — METOPROLOL TARTRATE 25 MG: 25 TABLET ORAL at 09:09

## 2018-09-25 RX ADMIN — BUDESONIDE 0.5 MG: 0.5 SUSPENSION RESPIRATORY (INHALATION) at 07:09

## 2018-09-25 RX ADMIN — PREDNISONE 5 MG: 5 TABLET ORAL at 09:09

## 2018-09-25 RX ADMIN — OLANZAPINE 5 MG: 5 TABLET, ORALLY DISINTEGRATING ORAL at 08:09

## 2018-09-25 RX ADMIN — SODIUM BICARBONATE 650 MG TABLET 2600 MG: at 09:09

## 2018-09-25 RX ADMIN — ASPIRIN 81 MG: 81 TABLET, COATED ORAL at 09:09

## 2018-09-25 RX ADMIN — INSULIN ASPART 2 UNITS: 100 INJECTION, SOLUTION INTRAVENOUS; SUBCUTANEOUS at 12:09

## 2018-09-25 NOTE — PLAN OF CARE
Problem: Patient Care Overview  Goal: Plan of Care Review  Outcome: Ongoing (interventions implemented as appropriate)  Recommendations     Recommendation/Intervention:   1. Continue current diet order.   2. Will continue to monitor     Goals: Meal intake >50% at all meals  Nutrition Goal Status: continues  Communication of RD Recs: (POC review, sticky note)

## 2018-09-25 NOTE — PT/OT/SLP PROGRESS
Physical Therapy  Treatment    Lavelle Ladd   MRN: [id number]   Admitting Diagnosis: Gas gangrene of foot    PT Received On: 09/25/18  PT Start Time: 1040     PT Stop Time: 1055    PT Total Time (min): 15 min       Billable Minutes:  Therapeutic Activity 15    Treatment Type: Treatment  PT/PTA: PT       General Precautions: Standard, fall  Orthopedic Precautions: LLE non weight bearing   Braces: N/A    Subjective:  Communicated with NURSE MARIPOSA prior to session.  Pain/Comfort  Pain Rating 1: 8/10  Location 1: rib(s)    Objective:   Patient found with: telemetry, peripheral IV    Functional Mobility:  Therapeutic Activities and Exercises:  PT FOUND SUPINE IN BED UPON ARRIVAL, 2ND ATTEMPT TODAY FOR PT TO PARTICIPATE, PT AGREEABLE ONLY DUE TO REQUEST TO GO TO BATHROOM, PT REFUSED TO USE BEDSIDE COMMODE, WANTING TO WALK TO BATHROOM.  SBA FOR SUP>SIT TF, PT ABLE TO [place] PROSTHESIS TO RLE WITH SET UP, REVIEW RW USE AND SAFETY WITH PT, REVIEW NWB TO LLE WITH PT, SIT>STAND WITH SBA, PT REFUSING PHYSICAL ASSISTANCE FROM THERAPIST, PT AMB 8' FROM BED TO BATHROOM WITH RW AND SBA, NON-COMPLIANT WITH NWB TO LLE DESPITE CONSTANT EDUCATION, UNSTEADY TF IN BATHROOM BUT STILL REFUSING ASSISTANCE FROM THERAPIST, TF TO TOILET WITH SBA, PT LEFT SEATED WITH DOOR CLOSED PER HIS REQUEST, NOTIFIED NURSE MARIPOSA AND AIDE PT IN BATHROOM NOT ALLOWING ASSISTANCE    AM-PAC 6 CLICK MOBILITY  How much help from another person does this patient currently need?   1 = Unable, Total/Dependent Assistance  2 = A lot, Maximum/Moderate Assistance  3 = A little, Minimum/Contact Guard/Supervision  4 = None, Modified [place]/Independent    Turning over in bed (including adjusting bedclothes, sheets and blankets)?: 4  Sitting down on and standing up from a chair with arms (e.g., wheelchair, bedside commode, etc.): 4  Moving from lying on back to sitting on the side of the bed?: 4  Moving to and from a bed to a chair (including a wheelchair)?: 4  Need  to walk in hospital room?: 4  Climbing 3-5 steps with a railing?: 1  Basic Mobility Total Score: 21    AM-PAC Raw Score CMS G-Code Modifier Level of Impairment Assistance   6 % Total / Unable   7 - 9 CM 80 - 100% Maximal Assist   10 - 14 CL 60 - 80% Moderate Assist   15 - 19 CK 40 - 60% Moderate Assist   20 - 22 CJ 20 - 40% Minimal Assist   23 CI 1-20% SBA / CGA   24 CH 0% Independent/ Mod I     Patient left SEATED ON TOILET IN BATHROOM with all lines intact, call button in reach and NURSE notified.    Assessment:  Lavelle Ladd is a 63 y.o. male with a medical diagnosis of Gas gangrene of foot and presents with IMPAIRED FUNCTIONAL MOBILITY. PT WILL BENEFIT FROM CONT. SKILLED P.T. TO ADDRESS IMPAIRMENTS IF PT WILLING TO PARTICIPATE    Rehab identified problem list/impairments: Rehab identified problem list/impairments: weakness, impaired endurance, impaired functional mobilty, gait instability, impaired balance, decreased safety awareness, decreased coordination    Rehab potential is fair.    Activity tolerance: Fair    Discharge recommendations: Discharge Facility/Level Of Care Needs: nursing facility, skilled     Barriers to discharge:      Equipment recommendations: Equipment Needed After Discharge: none     GOALS:   Multidisciplinary Problems     Physical Therapy Goals        Problem: Physical Therapy Goal    Goal Priority Disciplines Outcome Goal Variances Interventions   Physical Therapy Goal     PT, PT/OT Ongoing (interventions implemented as appropriate)     Description:  LTG'S TO BE MET IN 7 DAYS (9-27-18)  1. PT WILL BE JOE WITH BED MOBILITY  2. PT WILL REQUIRE SBA FOR TF'S  3. PT WILL AMB 45' WITH RW AND SBA  4. PT WILL DEMO F DYNAMIC BALANCE DURING TF'S                    PLAN:    Patient to be seen 5 x/week  to address the above listed problems via gait training, therapeutic activities, therapeutic exercises  Plan of Care expires: 09/27/18  Plan of Care reviewed with: patient    PT  ENCOURAGED TO CALL FOR ASSISTANCE WITH ALL NEEDS DUE TO FALL RISK STATUS, PT AGREEABLE    Kavita Arango, PT  09/25/2018

## 2018-09-25 NOTE — ASSESSMENT & PLAN NOTE
Patient's renal function has stabilized with creatinine at 1.7.     Continue immunosuppression with Prograf, prednisone.     Will monitor closely with repeat renal panel.    Tacrolimus at goal.

## 2018-09-25 NOTE — PLAN OF CARE
Problem: Patient Care Overview  Goal: Plan of Care Review  Outcome: Ongoing (interventions implemented as appropriate)  Assessment complete per flow sheet  AAO3 name date and situation  Neuro checks assessed   Blood glucose monitoring; insulin administered  Tolerated PRN pain medication well;patient stated moderated relief   Vital signs stable   Patient rounds assessed; Free of falls on current shift   Will continue monitor for any signs or symptoms of vital decline

## 2018-09-25 NOTE — SUBJECTIVE & OBJECTIVE
Interval History:     9/21/18: s/p left TMA today. Patient reports fatigue and mild abdominal pain (he had fall and fell on his abdomen, work-up was negative).     9/22/18: Patient states that he has been drinking plenty of fluids. He reports intermittent back pain.     9/23/18: Patient is feeling OK today. He mentions that he was diagnosed with AAA and request follow-up as outpatient.     9/24/18: patient had wound closure and tendoachilles lengthening performed today in am. Patient reports back/flank pain.     9/25/18:             Review of patient's allergies indicates:  No Known Allergies  Current Facility-Administered Medications   Medication Frequency    acetaminophen tablet 650 mg Q4H PRN    amLODIPine tablet 5 mg Daily    arformoterol nebulizer solution 15 mcg Q12H    aspirin EC tablet 81 mg Daily    budesonide nebulizer solution 0.5 mg Q12H    cefazolin (ANCEF) 2 gram in dextrose 5% 50 mL IVPB (premix) Q8H    dextrose 50% injection 12.5 g PRN    dextrose 50% injection 25 g PRN    gabapentin capsule 100 mg TID    glucagon (human recombinant) injection 1 mg PRN    glucose chewable tablet 16 g PRN    glucose chewable tablet 24 g PRN    hydrALAZINE injection 10 mg Q6H PRN    HYDROcodone-acetaminophen  mg per tablet 1 tablet Q4H PRN    insulin aspart U-100 pen 0-5 Units QID (AC + HS) PRN    insulin detemir U-100 pen 30 Units QHS    metoprolol tartrate (LOPRESSOR) tablet 25 mg BID    morphine injection 3 mg Q4H PRN    nozaseptin (NOZIN) nasal  BID    olanzapine zydis disintegrating tablet 5 mg QHS    ondansetron injection 4 mg Q8H PRN    pantoprazole EC tablet 40 mg Daily    predniSONE tablet 5 mg Daily    sodium bicarbonate tablet 2,600 mg BID    sodium chloride 0.9% flush 5 mL PRN    tacrolimus capsule 1.5 mg BID    traZODone tablet 50 mg Nightly PRN       Objective:     Vital Signs (Most Recent):  Temp: 98.5 °F (36.9 °C) (09/25/18 1149)  Pulse: 64 (09/25/18  1149)  Resp: 18 (09/25/18 1149)  BP: (!) 153/73 (09/25/18 1149)  SpO2: (!) 92 % (09/25/18 1149)  O2 Device (Oxygen Therapy): room air (09/25/18 0729) Vital Signs (24h Range):  Temp:  [97.8 °F (36.6 °C)-98.8 °F (37.1 °C)] 98.5 °F (36.9 °C)  Pulse:  [60-70] 64  Resp:  [16-20] 18  SpO2:  [0 %-100 %] 92 %  BP: (139-178)/(65-93) 153/73     Weight: 93 kg (205 lb 0.4 oz) (09/24/18 2014)  Body mass index is 28.6 kg/m².  Body surface area is 2.16 meters squared.    I/O last 3 completed shifts:  In: 800 [I.V.:800]  Out: 2565 [Urine:2550; Blood:15]    Physical Exam   Constitutional: He is oriented to person, place, and time. He appears well-developed. He is cooperative.   HENT:   Head: Normocephalic.   Nose: No rhinorrhea.   Mouth/Throat: Mucous membranes are normal. No oropharyngeal exudate.   Eyes: Pupils are equal, round, and reactive to light.   Neck: No thyroid mass present.   Cardiovascular: Normal rate, regular rhythm, S1 normal, S2 normal and intact distal pulses.   Pulmonary/Chest: Effort normal. No respiratory distress. He has no wheezes.   Abdominal: Soft. Bowel sounds are normal. He exhibits no distension. There is no tenderness. No hernia.   Musculoskeletal: Tenderness: lastpt.   S/p left TMA.    S/p right BKA.   Lymphadenopathy:     He has no cervical adenopathy.   Neurological: He is alert and oriented to person, place, and time.   Skin: Skin is warm and dry.   Psychiatric: He has a normal mood and affect.       Significant Labs:  Lab Results   Component Value Date    CREATININE 1.7 (H) 09/25/2018    BUN 23 09/25/2018     (L) 09/25/2018    K 3.9 09/25/2018     09/25/2018    CO2 22 (L) 09/25/2018     Lab Results   Component Value Date    .0 (H) 10/30/2017    CALCIUM 8.8 09/25/2018    CAION 1.10 05/30/2016    PHOS 2.8 09/25/2018     Lab Results   Component Value Date    ALBUMIN 2.5 (L) 09/18/2018     Lab Results   Component Value Date    WBC 8.03 09/25/2018    HGB 12.2 (L) 09/25/2018    HCT 37.9  (L) 09/25/2018    MCV 89 09/25/2018     09/25/2018     Recent Labs   Lab  09/18/18   1446   MG  1.9     Tacrolimus: 5.3 (9/19/18)      Significant Imaging:  Imaging Results          X-Ray Foot Complete Left (Final result)  Result time 09/18/18 19:50:43    Final result by Nicolas Sawyer MD (09/18/18 19:50:43)                 Impression:      1.  Progressive erosive changes with bone loss involving the 1st distal phalanx concerning for osteomyelitis.    2.  Air bubbles within the left 5th toe, concerning for possible infectious process or gangrene.  Clinical correlation is advised.    3.  Progressive soft tissue loss involving the 4th toe, which could also indicate gangrene.  Clinical correlation is advised.    4.  Persistent healing fracture of the 2nd proximal phalanx.  Other stable findings as noted above.      Electronically signed by: Nicolas Sawyer MD  Date:    09/18/2018  Time:    19:50             Narrative:    EXAMINATION:  XR FOOT COMPLETE 3 VIEW LEFT    CLINICAL HISTORY:  Pain in left foot.  Gangrene, not elsewhere classified    TECHNIQUE:  AP, lateral and oblique views of the left foot were performed.    COMPARISON:  August 16, 2018    FINDINGS:  There is been interval bone loss involving the distal 1st phalanx, with overlying soft tissue defect.  Healing fracture of the 2nd proximal phalanx again seen.  There has been progressive soft tissue loss overlying the 4th toe and there are air bubbles in the 5th toe soft tissues.  No other bone loss is seen.    Plantar and Achilles calcaneal spurs.  Osteopenia.  Vascular calcifications.  Accessory ossicle adjacent to the navicular bone.                               CT Renal Stone Study ABD Pelvis WO (Final result)  Result time 09/18/18 18:31:38    Final result by Nicolas Sawyer MD (09/18/18 18:31:38)                 Impression:      1.  Negative for acute process involving the abdomen or pelvis.  Negative for inflammatory changes.  Normal appendix.   Negative for renal stone disease or hydronephrosis, in this patient who has atrophic native kidneys and a right lower quadrant transplant kidney.    2. Again seen is mild prominence of the transplant kidney pelvocaliceal system, without dilation of the ureter, likely a chronic finding.    3.  Mild distention of the gallbladder, which is otherwise normal, nonspecific finding.    4.  Fluid within the nondilated colon, which can be seen in patients with diarrhea or gastroenteritis.  Clinical correlation is advised.    5.  Dependent atelectasis in the lung bases.    6.  Moderate sliding-type hiatal hernia with possible distal esophageal thickening.  Does the patient have symptoms of esophagitis?    7.  Stable findings as noted above.    All CT scans at this facility are performed  using dose modulation techniques as appropriate to performed exam including the following:  automated exposure control; adjustment of mA and/or kV according to the patients size (this includes techniques or standardized protocols for targeted exams where dose is matched to indication/reason for exam: i.e. extremities or head);  iterative reconstruction technique.      Electronically signed by: Nicolas Sawyer MD  Date:    09/18/2018  Time:    18:31             Narrative:    EXAMINATION:  CT RENAL STONE STUDY ABD PELVIS WO    CLINICAL HISTORY:  Flank pain, stone disease suspected;    TECHNIQUE:  Axial images through the abdomen and pelvis were obtained without the use of IV contrast.  Sagittal and coronal reconstructions are provided for review.  Oral contrast was not utilized.    COMPARISON:  December 11, 2016    FINDINGS:  LUNG BASES: Respiratory motion artifact is present on a number of images.  Subtle abnormalities could be overlooked.  Stable scarring or atelectasis in the inferior lingula and middle lobe.  Lung bases are otherwise clear.  Negative for pleural effusions.  There is a small amount of pericardial fluid.  Coronary artery  calcifications.  Moderate size hiatal hernia with thickening of the distal esophagus.    ABDOMEN: The gallbladder is mildly distended.  No surrounding inflammatory changes or wall thickening is seen.  Bilateral perinephric stranding densities noted, with mild atrophy of both kidneys again seen.  Vascular calcifications within the spleen.  Again seen is mild prominence of the biliary tree measuring up to 8 mm.  The liver, spleen and gallbladder otherwise appear normal.  The pancreas is unremarkable.  Kidneys and adrenal glands are otherwise normal.  Negative for hydronephrosis or renal stone disease.  Significant vascular calcifications are present within the renal pelves, with bilateral renal sinus lipomatosis again seen.    There remains to be a transplant kidney in the right anterior hemipelvis.  Mild prominence of the renal collecting system is again seen.  Negative for perinephric stranding densities.  Negative for definite renal stones.    Negative for adenopathy, free fluid or inflammatory change noted within the abdomen or pelvis.  Vascular calcifications are present without aneurysmal changes, with the aorta measuring a maximum of 2.9 cm.    The small bowel appears normal. Normal appendix.  There is fluid within the proximal and mid colon, without dilation or surrounding inflammatory changes.    PELVIS: The urinary bladder is mildly thick wall, a stable finding.  Prostate calcifications.  The   pelvic organs are breast of e-mail.  There are pelvic phleboliths.    No significant osseous abnormality is identified.  Negative for significant spinal canal stenosis. Similar degree of degenerative disc changes throughout the thoracic and lumbar spine, most significantly involving L3/L4.    Negative for groin adenopathy.    Postoperative changes involve the right pelvic abdominal wall from the transplant kidney placement.  The abdominal wall is intact.                               X-Ray Chest 1 View (Final result)   Result time 09/18/18 17:03:13    Final result by ANA CRISTINA Joseph Sr., MD (09/18/18 17:03:13)                 Impression:      1. There is a mild amount of haziness in the lateral aspect of the base of the left hemithorax.  This is characteristic of atelectasis or subtle pneumonia.  2. The left costophrenic angle is blunted.  This is characteristic of a tiny pleural effusion.  3. There is a mild amount of levoconvex curvature of the thoracic spine.  4. There is mild cardiomegaly.  .      Electronically signed by: Porter Joseph MD  Date:    09/18/2018  Time:    17:03             Narrative:    EXAMINATION:  XR CHEST 1 VIEW    CLINICAL HISTORY:  Adult failure to thrive    COMPARISON:  07/20/2018    FINDINGS:  The size of the heart is prominent.  There is a mild amount of haziness in the lateral aspect of the base of the left hemithorax.  The right lung is clear.  There is no pneumothorax.  The left costophrenic angle is blunted.  The right costophrenic angle is sharp.  There is a mild amount of levoconvex curvature of the thoracic spine.

## 2018-09-25 NOTE — PT/OT/SLP PROGRESS
Physical Therapy      Patient Name:  Lavelle Ladd   MRN:  [id number]    ATTEMPTED P.T. TX. THIS AM, PT REFUSED TX. DUE TO FEELING POORLY AND INCREASED PAIN TO RIBS, WILL ASSESS PT LATER THIS AM IF WILLING TO PARTICIPATE    Kavita Arango, PT   9/25/2018  0817

## 2018-09-25 NOTE — PLAN OF CARE
Problem: Physical Therapy Goal  Goal: Physical Therapy Goal  LTG'S TO BE MET IN 7 DAYS (9-27-18)  1. PT WILL BE JOE WITH BED MOBILITY  2. PT WILL REQUIRE SBA FOR TF'S  3. PT WILL AMB 45' WITH RW AND SBA  4. PT WILL DEMO F DYNAMIC BALANCE DURING TF'S   Outcome: Ongoing (interventions implemented as appropriate)  PT AGREEABLE TO GO TO BATHROOM ONLY, WOULD NOT ALLOW ASSISTANCE FROM THERAPIST DESPITE EDUCATION

## 2018-09-25 NOTE — PROGRESS NOTES
[facility] -   Adult Nutrition  Progress Note    SUMMARY     Recommendations    Recommendation/Intervention:   1. Continue current diet order.   2. Will continue to monitor    Goals: Meal intake >50% at all meals  Nutrition Goal Status: continues  Communication of RD Recs: (POC review, sticky note)    Reason for Assessment    Reason for Assessment: RD follow up   Dx:  1. Dehydration    2. Flank pain    3. Failure to thrive in adult    4. Gangrene of left foot    5. Hyperglycemia    6. Sepsis    7. PVD (peripheral vascular disease)    8. Gas gangrene of foot    9. Type 2 diabetes mellitus with hyperglycemia, with long-term current use of insulin    10. Bacteremia    11. Gangrene    12. Gangrene of toe of left foot      Past Medical History:   Diagnosis Date    DINORAH (acute kidney injury) 2016    Arthritis     Chronic obstructive pulmonary disease 2016    Coronary artery disease involving native coronary artery of native heart without angina pectoris 2016     donor kidney transplant for DM 16     Induction with Thymo x3 and IV solumedrol to total 875mg  Kidney Biopsy  2016: 16 glomeruli, ACR type 1 AVR type 2, significant microcirculatory changes, c4d negative, No DSA, 5 to10% fibrosis. Treated with thymo x8 2016- no rejection      Diastolic heart failure     Encounter for blood transfusion     ESRD on RRT since 10/2013 10/29/2013    Biopsy proven diabetic nephropathy and lymphoplasmacytic interstitial infiltrate not c/w with AIN (ddx sjogrens or assoc with tamm-horsefall protein extravasation)     GERD (gastroesophageal reflux disease)     History of hepatitis C, s/p successful Rx w/ SVR12 - 2017    Completed 12 weeks harvoni w/ SVR    Hyperlipidemia     Hypertension     PIC line (peripherally inserted central catheter) flush     Prophylactic immunotherapy     Proteinuria     PVD (peripheral vascular disease) 2017    RLE BKA CT  "12/11/16 Extensive atherosclerotic disease of the aorta and mesenteric arteries.     Renal hypertension     Type 2 diabetes mellitus with diabetic neuropathy, with long-term current use of insulin 12/1/2016    Vitamin B12 deficiency        General Information Comments:     9/19/18 Pt admitted w/ flank pain, FTT. Pt was oriented, not alert, had slurred speech. Pt noted to be noncompliant, states he doesn't follow any diet at home. RD encouraged following diabetic diet to control BG levels, prevent adverse consequences. Pt verbalized understanding. Pt diet just changed to ADA 2000 today. Before, pt reports eating yesterday w/ no subsequent n/v.    9/25/18 Pt reports fair appetite, PO intake of ~ 50% of meals. Denies N/V/D/C. Chews and swallows fine. Pt is not interested in oral supplement at this time. Pt was reeducated on diabetic diet. Pt verbalized understanding. All questions and concerns answered.     Nutrition Discharge Planning: Diabetic diet    Nutrition Risk Screen    Nutrition Risk Screen: no indicators present    Nutrition/Diet History    Do you have any cultural, spiritual, Congregation conflicts, given your current situation?: none    Anthropometrics    Temp: 98.8 °F (37.1 °C)  Height Method: Stated  Height: 5' 11" (180.3 cm)  Height (inches): 71 in  Weight Method: Standard Scale  Weight: 93 kg (205 lb 0.4 oz)  Weight (lb): 205.03 lb  Ideal Body Weight (IBW), Male: 172 lb  % Ideal Body Weight, Male (lb): 119.2 lb  BMI (Calculated): 28.7  BMI Grade: 25 - 29.9 - overweight  Amputation %: 5.9  Total Amputation %: 5.9  Amputation Ideal Body Weight (IBW), Male (lb): 166.1 lb  Amputee BMI (kg/m2): 30.5 kg/m2  Anthropometrics Special Considerations: Amputations Present (Ideal Body Weight)    Lab/Procedures/Meds    Pertinent Labs Reviewed: reviewed  BMP  Lab Results   Component Value Date     (L) 09/25/2018    K 3.9 09/25/2018     09/25/2018    CO2 22 (L) 09/25/2018    BUN 23 09/25/2018    CREATININE " 1.7 (H) 09/25/2018    CALCIUM 8.8 09/25/2018    ANIONGAP 13 09/25/2018    ESTGFRAFRICA 48 (A) 09/25/2018    EGFRNONAA 41 (A) 09/25/2018     Lab Results   Component Value Date    CALCIUM 8.8 09/25/2018    PHOS 2.8 09/25/2018     Lab Results   Component Value Date    ALBUMIN 2.5 (L) 09/18/2018     Recent Labs   Lab  09/25/18   0632   POCTGLUCOSE  153*     Lab Results   Component Value Date    HGBA1C 8.0 (H) 09/19/2018       Pertinent Medications Reviewed: reviewed  Scheduled Meds:   amLODIPine  5 mg Oral Daily    arformoterol  15 mcg Nebulization Q12H    aspirin  81 mg Oral Daily    budesonide  0.5 mg Nebulization Q12H    ceFAZolin (ANCEF) IVPB  2 g Intravenous Q8H    gabapentin  100 mg Oral TID    insulin detemir U-100  30 Units Subcutaneous QHS    metoprolol tartrate  25 mg Oral BID    nozaseptin   Each Nare BID    olanzapine zydis  5 mg Oral QHS    pantoprazole  40 mg Oral Daily    predniSONE  5 mg Oral Daily    sodium bicarbonate  2,600 mg Oral BID    tacrolimus  1.5 mg Oral BID     Continuous Infusions:  PRN Meds:.acetaminophen, dextrose 50%, dextrose 50%, glucagon (human recombinant), glucose, glucose, hydrALAZINE, HYDROcodone-acetaminophen, insulin aspart U-100, morphine, ondansetron, sodium chloride 0.9%, traZODone    Physical Findings/Assessment    Overall Physical Appearance: obese  Oral/Mouth Cavity: tooth/teeth missing  Skin: (Matthew score = 18)    Estimated/Assessed Needs    Weight Used For Calorie Calculations: 93 kg (205 lb 0.4 oz)  Energy Calorie Requirements (kcal): 2084  Energy Need Method: [place]-St Jeor(x 1.2)  Protein Requirements: 75-93 g, 0.8-1.0 g/kg  Weight Used For Protein Calculations: 93 kg (205 lb 0.4 oz)  Fluid Requirements (mL): 1 mL/kcal or per MD  Fluid Need Method: RDA Method  RDA Method (mL): 2084  CHO Requirement: 50% EEN      Nutrition Prescription Ordered    Current Diet Order: ADA 2000 kcal    Evaluation of Received Nutrient/Fluid Intake       % Intake of Estimated  Energy Needs: STEVENSON  % Meal Intake: STEVENSON    Nutrition Risk    Level of Risk/Frequency of Follow-up: (f/u 1x/week ) F/u x2 weekly    Assessment and Plan    Nutrition Problem  Altered nutrition-related lab values    Related to (etiology):   Endocrine dysfunction    Signs and Symptoms (as evidenced by):   Lab Results   Component Value Date    HGBA1C 8.0 (H) 09/19/2018     Interventions/Recommendations (treatment strategy):  See above    Nutrition Diagnosis Status:   Continues         Monitor and Evaluation    Food and Nutrient Intake: energy intake  Food and Nutrient Adminstration: diet order  Knowledge/Beliefs/Attitudes: food and nutrition knowledge/skill  Anthropometric Measurements: height/length, weight  Biochemical Data, Medical Tests and Procedures: electrolyte and renal panel, glucose/endocrine profile  Nutrition-Focused Physical Findings: overall appearance, skin     Nutrition Follow-Up    RD Follow-up?: YesAshley Puckett, Dietetic intern      I certify that I directed the dietetic intern in service delivery and guided them using my skilled judgment. As the cosigning dietitian, I have reviewed the dietetic interns documentation and am responsible for the treatment, assessment, and plan.

## 2018-09-25 NOTE — DISCHARGE SUMMARY
[facility] Hospital Medicine  Discharge Summary      Patient Name: Lavelle Ladd  MRN: [id number]  Admission Date: 9/18/2018  Hospital Length of Stay: 8 days  Discharge Date and Time: 9/26/18 10:20 AM  Attending Physician: Chapin Procotr MD   Discharging Provider: JUVENAL Shay  Primary Care Provider: Rishabh Esteban MD      HPI:   HPI limited due to patient confusion and obtained from Er records.  Lavelle Ladd is a 63 y.o. male kidney transplant patient with hx of HTN, CAD, diastolic heart failure, Type 2 DM with diabetic neuropathy, ESRD, PVD, COPD who was brought to ED via EMS secondary to failure to thrive which has been present for an unknown amount of time. EMS reported that there was human and dog feces in patient's room and that patient does not want to take his medications. His CBG was 348. Patient with lower foot wound to left. Patient evalauted in ER. WBC 16.72, Na 127, Anion 18, Cr. 1.9, Trop 0.036, ,  CT renal:1. Negative for acute process involving the abdomen or pelvis. Negative for inflammatory changes. Normal appendix. Negative for renal stone disease or hydronephrosis, in this patient who has atrophic native kidneys and a right lower quadrant transplant kidney.2. Again seen is mild prominence of the transplant kidney pelvocaliceal system, without dilation of the ureter, likely a chronic finding.3. Mild distention of the gallbladder, which is otherwise normal, nonspecific finding.4. Fluid within the nondilated colon, which can be seen in patients with diarrhea or gastroenteritis. Clinical correlation is advised.5. Dependent atelectasis in the lung bases.6. Moderate sliding-type hiatal hernia with possible distal esophageal thickening. Does the patient have symptoms of esophagitis?7. Stable findings as noted above.  Chest Xray1. There is a mild amount of haziness in the lateral aspect of the base of the left hemithorax.  This is characteristic of atelectasis or  subtle pneumonia.2. The left costophrenic angle is blunted.  This is characteristic of a tiny pleural effusion.3. There is a mild amount of levoconvex curvature of the thoracic spine.4. There is mild cardiomegaly. Xray left foot:1.  Progressive erosive changes with bone loss involving the 1st distal phalanx concerning for osteomyelitis.2.  Air bubbles within the left 5th toe, concerning for possible infectious process or gangrene.  Clinical correlation is advised.3.  Progressive soft tissue loss involving the 4th toe, which could also indicate gangrene.  Clinical correlation is advised. Hosptial medicine consulted. Patient admitted    [facility] Composed by Ryan Rae 9/19/18 0559    Procedure(s) (LRB):  CLOSURE, WOUND (Left)  LENGTHENING, TENDON, ACHILLES (Left)      Hospital Course:   The patient was admitted to Telemetry with gas gangrene of the left foot under the care of Hospital Medicine. He was started on IV vancomycin and IV Zosyn. Vascular Surgery was consulted and recommended a left BKA. Podiatry was consulted who also recommended a left BKA, but the patient refused, instead agreeing to a left TMA. Blood cultures on 9/18/18 grew MSSA. Repeat blood cultures on 9/21/18 were negative. On 9/21/18, the patient had a TMA. On 9/22/18, the patient was ambulating on his TMA and advised of his non-weight bearing status. On 9/24/18, the patient underwent wound closure by Podiatry. SNF was recommended, but the patient declined and requests home health and home IV infusion. Infectious Disease recommended IV Cefazolin every 8 hours for 14 days for his MSSA gangrenous infection. Plans for PICC line placement today.         Consults:   Consults (From admission, onward)        Status Ordering Provider     Inpatient consult to Hospitalist  Once     Provider:  Rodrick Dominguez MD    Acknowledged MAMI FERRARO     Inpatient consult to Infectious Diseases  Once     Provider:  Ronny Veliz MD    Acknowledged ESTEBAN STANLEY  E.     Inpatient consult to Nephrology  Once     Provider:  Avel Estrada MD    Acknowledged FLORA MORA     Inpatient consult to PICC team ([facility])  Once     Provider:  (Not yet assigned)    Acknowledged EUSEBIA DAVIDSON     Inpatient consult to Podiatry  Once     Provider:  Karla Corbett DPM    Completed FLORA MORA     Inpatient consult to Registered Dietitian/Nutritionist  Once     Provider:  (Not yet assigned)    Completed FLORA MORA     Inpatient consult to Social Work  Once     Provider:  (Not yet assigned)    Completed FLORA MORA     Inpatient consult to Social Work/Case Management  Once     Provider:  (Not yet assigned)    Completed EUSEBIA DAVIDSON     Inpatient consult to Social Work/Case Management  Once     Provider:  (Not yet assigned)    Completed EUSEBIA DAVIDSON     Inpatient consult to Social Work/Case Management  Once     Provider:  (Not yet assigned)    Completed EUSEBIA DAVIDSON     Inpatient consult to Social Work/Case Management  Once     Provider:  (Not yet assigned)    Completed EUSEBIA DAVIDSON     Inpatient consult to Vascular Surgery  Once     Provider:  Horacio Uribe IV, MD    Acknowledged KARLA CORBETT          Final Active Diagnoses:    Diagnosis Date Noted POA    Gangrene of left foot [I96] 2018 Yes    MSSA bacteremia [R78.81] 2018 Yes     donor kidney transplant for DM 16 [Z94.0]  Not Applicable     Chronic    Fracture of one rib of right side [S22.31XA] 2018 Yes    PVD (peripheral vascular disease) [I73.9] 2017 Yes     Chronic    Failure to thrive in adult [R62.7] 2018 Yes    Type 2 diabetes mellitus with retinopathy, with long-term current use of insulin [E11.319, Z79.4] 2016 Not Applicable    Chronic kidney disease (CKD), stage III (moderate) [N18.3] 2016 Yes     Chronic    Essential hypertension [I10] 2015 Yes      Problems Resolved During this Admission:    Diagnosis  Date Noted Date Resolved POA    PRINCIPAL PROBLEM:  Gas gangrene of foot [A48.0] 09/19/2018 09/25/2018 Yes    Hypophosphatemia [E83.39] 09/23/2018 09/26/2018 Yes    Gas gangrene [A48.0] 09/24/2018 09/25/2018 Yes    Bacteremia [R78.81] 09/20/2018 09/25/2018 Yes    Osteomyelitis of left foot [M86.9] 09/19/2018 09/26/2018 Yes    Sepsis [A41.9] 09/18/2018 09/25/2018 Yes       Discharged Condition: stable    Disposition: Home-Health Care [facility]    Follow Up:  Follow-up Information     Rishabh Esteban MD In 3 days.    Specialty:  Family Medicine  Contact information:  [address]  [address]  [place] [address]  [phone]             Karla Wheeler DPM In 1 week.    Specialty:  Podiatry  Contact information:  [address]  [place] [address]  [phone]             [facility] .    Specialty:  Home Health Services  Contact information:  [address]  [place] [address]  [phone]             [facility].    Specialties:  Pharmacist, DME Provider, IV Infusion  Why:  Infusion  Contact information:  [address]  [address]  [place] [address]  [phone]             [facility] .    Specialty:  Home Health Services  Contact information:  [address]  [place] [address]  [phone]                 Patient Instructions:      Ambulatory referral to Home Health   Referral Priority: Routine Referral Type: Home Health   Referral Reason: Specialty Services Required   Ref[facility] Provider: [facility] Requested Specialty: Home Health Services   Number of Visits Requested: 1     Diet diabetic     Leave dressing on - Keep it clean, dry, and intact until clinic visit     Weight bearing restrictions (specify):   Order Comments: Non weight bearing L Foot       Significant Diagnostic Studies: Labs:   BMP:   Recent Labs   Lab  09/25/18   0536  09/26/18   0503   GLU  167*  199*   NA  135*  136   K  3.9  4.2  "  CL  100  102   CO2  22*  22*   BUN  23  26*   CREATININE  1.7*  1.7*   CALCIUM  8.8  9.2   , CMP   Recent Labs   Lab  09/25/18   0536  09/26/18   0503   NA  135*  136   K  3.9  4.2   CL  100  102   CO2  22*  22*   GLU  167*  199*   BUN  23  26*   CREATININE  1.7*  1.7*   CALCIUM  8.8  9.2   ANIONGAP  13  12   ESTGFRAFRICA  48*  48*   EGFRNONAA  41*  41*   , CBC   Recent Labs   Lab  09/25/18   0536  09/26/18   0503   WBC  8.03  8.00   HGB  12.2*  12.1*   HCT  37.9*  38.2*   PLT  348  364*   , INR   Lab Results   Component Value Date    INR 1.1 09/19/2018    INR 0.9 07/06/2018    INR 1.0 06/15/2017   , Troponin   No results for input(s): TROPONINI in the last 168 hours. and All labs within the past 24 hours have been reviewed    Pending Diagnostic Studies:     Procedure Component Value Units Date/Time    US Ankle Brachial Indices Ext LTD WO Str [[id number]]     Order Status:  Sent Lab Status:  No result          Medications:  Reconciled Home Medications:      Medication List      START taking these medications    ceFAZolin 2 g/50mL Dextrose IVPB 2 gram/50 mL Pgbk  Commonly known as:  ANCEF  Inject 50 mLs (2,000 mg total) into the vein every 8 (eight) hours. Patient to receive Home IV infusion from [facility]. X q 8 hrs x 14 days for 14 days     metoprolol tartrate 25 MG tablet  Commonly known as:  LOPRESSOR  Take 1 tablet (25 mg total) by mouth 2 (two) times daily.     nozaseptin nasal   Commonly known as:  NOZIN  1 each by Each Nare route 2 (two) times daily.        CHANGE how you take these medications    HYDROcodone-acetaminophen  mg per tablet  Commonly known as:  NORCO  Take 1 tablet by mouth every 4 (four) hours as needed.  What changed:  when to take this     * pen needle, diabetic 32 gauge x 5/32" Ndle  Commonly known as:  EASY COMFORT PEN NEEDLES  Inject 1 each into the skin 3 (three) times daily.  What changed:  Another medication with the same name was removed. Continue taking this medication, " "and follow the directions you see here.     * BD ULTRA-FINE SHORT PEN NEEDLE 31 gauge x 5/16" Ndle  Generic drug:  pen needle, diabetic  What changed:  Another medication with the same name was removed. Continue taking this medication, and follow the directions you see here.         * This list has 2 medication(s) that are the same as other medications prescribed for you. Read the directions carefully, and ask your doctor or other care provider to review them with you.            CONTINUE taking these medications    amLODIPine 2.5 MG tablet  Commonly known as:  NORVASC  Take 1 tablet (2.5 mg total) by mouth once daily.     aspirin 81 MG EC tablet  Commonly known as:  ECOTRIN  Take 1 tablet (81 mg total) by mouth once daily.     BD INSULIN SYRINGE ULTRA-FINE 1 mL 31 gauge x 5/16 Syrg  Generic drug:  insulin syringe-needle U-100  USE ONE AS DIRECTED FOUR TIMES DAILY WITH MEALS AND AT BEDTIME     blood sugar diagnostic Strp  1 each by Misc.(Non-Drug; Combo Route) route 3 (three) times daily.     blood-glucose meter kit  Use as instructed     budesonide-formoterol 160-4.5 mcg 160-4.5 mcg/actuation Hfaa  Commonly known as:  SYMBICORT  Inhale 2 puffs into the lungs every 12 (twelve) hours. Wash out mouth after using     ergocalciferol 50,000 unit Cap  Commonly known as:  ERGOCALCIFEROL  Take 1 capsule (50,000 Units total) by mouth every 7 days. Take on Mondays     gabapentin 300 MG capsule  Commonly known as:  NEURONTIN     inhalation spacing device  Commonly known as:  BREATHERITE VALVED MDI CHAMBER  Use as directed for inhalation.     insulin  unit/mL injection  Commonly known as:  NovoLIN N NPH U-100 Insulin  Take 25 units subq with breakfast and 15 units at bedtime     lancets Misc  1 each by Misc.(Non-Drug; Combo Route) route 3 (three) times daily.     NovoLIN R Regular U-100 Insuln 100 unit/mL injection  Generic drug:  insulin regular  INJECT 6 UNITS WITH BREAKFAST AND 8 UNITS WITH DINNER, SUBCUTANEOUSLY 30 " MIN BEFORE MEAL.     ondansetron 4 MG Tbdl  Commonly known as:  ZOFRAN-ODT  Take 1 tablet (4 mg total) by mouth every 8 (eight) hours as needed.     predniSONE 5 MG tablet  Commonly known as:  DELTASONE  Take 1 tablet (5 mg total) by mouth once daily.     sodium bicarbonate 650 MG tablet  Take 4 tablets (2,600 mg total) by mouth 2 (two) times daily.     tacrolimus 0.5 MG Cap  Commonly known as:  PROGRAF  Take 3 capsules (1.5 mg total) by mouth every 12 (twelve) hours. Z94.0 Kidney Transplant        STOP taking these medications    zolpidem 5 MG Tab  Commonly known as:  AMBIEN            Indwelling Lines/Drains at time of discharge:   Lines/Drains/Airways          None          Time spent on the discharge of patient: 45 minutes  Patient was seen and examined on the date of discharge and determined to be suitable for discharge.         JUVENAL Shay  Department of Hospital Medicine  [facility]

## 2018-09-25 NOTE — PROGRESS NOTES
[facility]  Infectious Disease  Progress Note    Patient Name: Lavelle Ladd  MRN: [id number]  Admission Date: 9/18/2018  Length of Stay: 7 days  Attending Physician: Chapin Proctor MD  Primary Care Provider: Rishabh Esteban MD    Isolation Status: No active isolations  Assessment/Plan:      Osteomyelitis of left foot    S/p TMA -will need therapy for 2 weeks for MSSA bacteremia   Will continue IV Zosyn  .  He had interval TMA.      09/24- will follow final cultures and will switch to IV Cefazolin in am .'All cultures -blood and from the foot -MSSA.        PVD (peripheral vascular disease)    Vascular surgery follow up         Type 2 diabetes mellitus with retinopathy, with long-term current use of insulin    Continue insulin sliding scale , diabetic diet   Closely follow primary team.        Chronic kidney disease (CKD), stage III (moderate)    Will continue close monitoring of serum creatinine .  Avoid nephrotoxic meds.            Anticipated Disposition:     Thank you for your consult. I will follow-up with patient. Please contact us if you have any additional questions.    Ronny Veliz MD  Infectious Disease  [facility]    Subjective:     Principal Problem:Gas gangrene of foot    HPI:  63 year old man s/p  kidney transplant patient with hx of HTN, CAD, diastolic heart failure, Type 2 DM with diabetic neuropathy, ESRD, PVD, COPD who was brought to ED via EMS secondary to failure to thrive .He was noted to be covered in human and dog feces when he seen by EMS  Since admission, imaging test showed 1.  Progressive erosive changes with bone loss involving the 1st distal phalanx concerning for osteomyelitis.2.  Air bubbles within the left 5th toe, concerning for possible infectious process or gangrene.3.  Progressive soft tissue loss involving the 4th toe, which could also indicate gangrene.    Since admission, he was seen by podiatry and had  open TMA left lower extremity with  gangrene and purulent drainage noted intraoperatively..  Blood cultures are positive for MSSA.    Interval History:  63 year old man with history of MSSA bacteremia  ,osteomyelitis , cultures from the foot -MSSA.  He has persistent pain over the right chest wall in the back.  Review of Systems   Constitutional: Negative for chills, diaphoresis, fatigue and fever.   HENT: Negative for congestion, postnasal drip, rhinorrhea and sore throat.    Eyes: Negative for pain and visual disturbance.   Respiratory: Negative for cough, shortness of breath and wheezing.    Cardiovascular: Negative for chest pain, palpitations and leg swelling.   Gastrointestinal: Negative for abdominal distention, abdominal pain, constipation, diarrhea, nausea and vomiting.   Endocrine: Negative for cold intolerance and heat intolerance.   Genitourinary: Negative for difficulty urinating, dysuria and hematuria.   Musculoskeletal: Positive for back pain.   Skin: Positive for wound.   Allergic/Immunologic: Positive for immunocompromised state.   Neurological: Negative for dizziness, syncope, speech difficulty, weakness, light-headedness and headaches.   Hematological: Negative.    Psychiatric/Behavioral: Negative for agitation, behavioral problems and confusion.     Objective:     Vital Signs (Most Recent):  Temp: 98.5 °F (36.9 °C) (09/24/18 1945)  Pulse: 66 (09/24/18 2014)  Resp: 18 (09/24/18 2014)  BP: (!) 176/85 (09/24/18 1945)  SpO2: 95 % (09/24/18 2014) Vital Signs (24h Range):  Temp:  [98.5 °F (36.9 °C)-98.9 °F (37.2 °C)] 98.5 °F (36.9 °C)  Pulse:  [58-70] 66  Resp:  [16-20] 18  SpO2:  [0 %-95 %] 95 %  BP: (131-212)/() 176/85     Weight: 93 kg (205 lb 0.4 oz)  Body mass index is 28.6 kg/m².    Estimated Creatinine Clearance: 53.6 mL/min (A) (based on SCr of 1.6 mg/dL (H)).    Physical Exam   Constitutional: He is oriented to person, place, and time. He appears well-developed. He is cooperative.   HENT:   Head: Normocephalic.   Nose: No  rhinorrhea.   Mouth/Throat: Mucous membranes are normal. No oropharyngeal exudate.   Eyes: Pupils are equal, round, and reactive to light.   Neck: No thyroid mass present.   Cardiovascular: Normal rate, regular rhythm, S1 normal, S2 normal and intact distal pulses.   Pulmonary/Chest: Effort normal. No respiratory distress. He has no wheezes.   Abdominal: Soft. Bowel sounds are normal. He exhibits no distension. There is no tenderness. No hernia.   Musculoskeletal: Tenderness: lastpt.   S/p left TMA.    S/p right BKA.   Lymphadenopathy:     He has no cervical adenopathy.   Neurological: He is alert and oriented to person, place, and time.   Skin: Skin is warm and dry.   Psychiatric: He has a normal mood and affect.   Nursing note and vitals reviewed.      Significant Labs:   Blood Culture:   Recent Labs   Lab  07/20/18   1415  09/18/18   1800  09/18/18   1812  09/21/18   0907  09/21/18   1516   LABBLOO  No growth after 5 days.  Gram stain susan bottle: Gram positive cocci in clusters resembling Staph   Results called to and read back by:NOA Eckert RN 09/19/2018  15:30  Gram stain aer bottle: Gram positive cocci in clusters resembling Staph   09/19/2018  15:56  STAPHYLOCOCCUS AUREUS  ID consult required at [facility].    Gram stain aer bottle: Gram positive cocci in clusters resembling Staph   Gram stain susan bottle: Gram positive cocci in clusters resembling Staph   Results called to and read back by:NOA Eckert RN 09/19/2018  15:31  STAPHYLOCOCCUS AUREUS  ID consult required at [facility].  For susceptibility see order # [id number]    No Growth to date  No Growth to date  No Growth to date  No Growth to date  No Growth to date  No Growth to date  No Growth to date     BMP:   Recent Labs   Lab  09/24/18   0503   GLU  216*   NA  136   K  3.8   CL  101   CO2  23   BUN  25*   CREATININE  1.6*   CALCIUM  8.7     CBC:   Recent Labs   Lab  09/23/18   0553   09/24/18   0503   WBC  8.75  8.50   HGB  12.7*  12.5*   HCT  39.3*  38.7*   PLT  292  333     All pertinent labs within the past 24 hours have been reviewed.    Significant Imaging: I have reviewed all pertinent imaging results/findings within the past 24 hours.

## 2018-09-25 NOTE — CONSULTS
Consult received for home infusion and home health. Patient previously signed preference letter for [facility]. Notified Norma Irby Clinical Liaison with [facility]. Patient has been accepted by Marie . [facility].      @ [phone] Discharge orders noted. Faxed appropriate paperwork to [facility]. Patient to discharge once picc placed and medication received.

## 2018-09-25 NOTE — ASSESSMENT & PLAN NOTE
S/p TMA -will need therapy for 2 weeks for MSSA bacteremia   Will continue IV Zosyn  .  He had interval TMA.      09/24- will follow final cultures and will switch to IV Cefazolin in am .'All cultures -blood and from the foot -MSSA.

## 2018-09-25 NOTE — PROGRESS NOTES
[facility] -   Nephrology  Progress Note    Patient Name: Lavelle Ladd  MRN: [id number]  Admission Date: 9/18/2018  Hospital Length of Stay: 7 days  Attending Provider: Chapin Proctor MD   Primary Care Physician: Rishabh Esteban MD  Principal Problem:Gas gangrene of foot    Subjective:     HPI: Patient is a 63-year-old male with type 1 diabetes with multiple complications including peripheral neuropathy and peripheral arterial disease.  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  Patient has been struggling with left foot wound and peripheral arterial disease along with osteomyelitis and gangrene of the left foot.  Patient is admitted with severe infection of the left foot with the possibility of needing surgical intervention.  Patient's baseline creatinine has been ranging between 1.6 and 1.9.  Patient's creatinine on admission is 1.9 and today is 1.8.  Patient is off and on confused and in bed social situation at home he was found to have feces all over himself.  Patient has an elderly mother at home as well with multiple social issues which are being addressed at this time.  Patient seen and examined in the hospital room bedside for renal transplant issues.  Patient has been on Prograf and prednisone.  No CellCept has been given to her at this time.    Interval History:     9/21/18: s/p left TMA today. Patient reports fatigue and mild abdominal pain (he had fall and fell on his abdomen, work-up was negative).     9/22/18: Patient states that he has been drinking plenty of fluids. He reports intermittent back pain.     9/23/18: Patient is feeling OK today. He mentions that he was diagnosed with AAA and request follow-up as outpatient.     9/24/18: patient had wound closure and tendoachilles lengthening performed today in am. Patient reports back/flank pain.     9/25/18: patient without complaints at present. Renal function has remained stable.             Review of patient's allergies indicates:  No  Known Allergies  Current Facility-Administered Medications   Medication Frequency    acetaminophen tablet 650 mg Q4H PRN    amLODIPine tablet 5 mg Daily    arformoterol nebulizer solution 15 mcg Q12H    aspirin EC tablet 81 mg Daily    budesonide nebulizer solution 0.5 mg Q12H    cefazolin (ANCEF) 2 gram in dextrose 5% 50 mL IVPB (premix) Q8H    dextrose 50% injection 12.5 g PRN    dextrose 50% injection 25 g PRN    gabapentin capsule 100 mg TID    glucagon (human recombinant) injection 1 mg PRN    glucose chewable tablet 16 g PRN    glucose chewable tablet 24 g PRN    hydrALAZINE injection 10 mg Q6H PRN    HYDROcodone-acetaminophen  mg per tablet 1 tablet Q4H PRN    insulin aspart U-100 pen 0-5 Units QID (AC + HS) PRN    insulin detemir U-100 pen 30 Units QHS    metoprolol tartrate (LOPRESSOR) tablet 25 mg BID    morphine injection 3 mg Q4H PRN    nozaseptin (NOZIN) nasal  BID    olanzapine zydis disintegrating tablet 5 mg QHS    ondansetron injection 4 mg Q8H PRN    pantoprazole EC tablet 40 mg Daily    predniSONE tablet 5 mg Daily    sodium bicarbonate tablet 2,600 mg BID    sodium chloride 0.9% flush 5 mL PRN    tacrolimus capsule 1.5 mg BID    traZODone tablet 50 mg Nightly PRN       Objective:     Vital Signs (Most Recent):  Temp: 98.5 °F (36.9 °C) (09/25/18 1149)  Pulse: 64 (09/25/18 1149)  Resp: 18 (09/25/18 1149)  BP: (!) 153/73 (09/25/18 1149)  SpO2: (!) 92 % (09/25/18 1149)  O2 Device (Oxygen Therapy): room air (09/25/18 0729) Vital Signs (24h Range):  Temp:  [97.8 °F (36.6 °C)-98.8 °F (37.1 °C)] 98.5 °F (36.9 °C)  Pulse:  [60-70] 64  Resp:  [16-20] 18  SpO2:  [0 %-100 %] 92 %  BP: (139-178)/(65-93) 153/73     Weight: 93 kg (205 lb 0.4 oz) (09/24/18 2014)  Body mass index is 28.6 kg/m².  Body surface area is 2.16 meters squared.    I/O last 3 completed shifts:  In: 800 [I.V.:800]  Out: 2565 [Urine:2550; Blood:15]    Physical Exam   Constitutional: He is oriented  to person, place, and time. He appears well-developed. He is cooperative.   HENT:   Head: Normocephalic.   Nose: No rhinorrhea.   Mouth/Throat: Mucous membranes are normal. No oropharyngeal exudate.   Eyes: Pupils are equal, round, and reactive to light.   Neck: No thyroid mass present.   Cardiovascular: Normal rate, regular rhythm, S1 normal, S2 normal and intact distal pulses.   Pulmonary/Chest: Effort normal. No respiratory distress. He has no wheezes.   Abdominal: Soft. Bowel sounds are normal. He exhibits no distension. There is no tenderness. No hernia.   Musculoskeletal: Tenderness: lastpt.   S/p left TMA.    S/p right BKA.   Lymphadenopathy:     He has no cervical adenopathy.   Neurological: He is alert and oriented to person, place, and time.   Skin: Skin is warm and dry.   Psychiatric: He has a normal mood and affect.       Significant Labs:  Lab Results   Component Value Date    CREATININE 1.7 (H) 09/25/2018    BUN 23 09/25/2018     (L) 09/25/2018    K 3.9 09/25/2018     09/25/2018    CO2 22 (L) 09/25/2018     Lab Results   Component Value Date    .0 (H) 10/30/2017    CALCIUM 8.8 09/25/2018    CAION 1.10 05/30/2016    PHOS 2.8 09/25/2018     Lab Results   Component Value Date    ALBUMIN 2.5 (L) 09/18/2018     Lab Results   Component Value Date    WBC 8.03 09/25/2018    HGB 12.2 (L) 09/25/2018    HCT 37.9 (L) 09/25/2018    MCV 89 09/25/2018     09/25/2018     Recent Labs   Lab  09/18/18   1446   MG  1.9     Tacrolimus: 5.3 (9/19/18)      Significant Imaging:  Imaging Results          X-Ray Foot Complete Left (Final result)  Result time 09/18/18 19:50:43    Final result by Nicolas Sawyer MD (09/18/18 19:50:43)                 Impression:      1.  Progressive erosive changes with bone loss involving the 1st distal phalanx concerning for osteomyelitis.    2.  Air bubbles within the left 5th toe, concerning for possible infectious process or gangrene.  Clinical correlation is  advised.    3.  Progressive soft tissue loss involving the 4th toe, which could also indicate gangrene.  Clinical correlation is advised.    4.  Persistent healing fracture of the 2nd proximal phalanx.  Other stable findings as noted above.      Electronically signed by: Nicolas Sawyer MD  Date:    09/18/2018  Time:    19:50             Narrative:    EXAMINATION:  XR FOOT COMPLETE 3 VIEW LEFT    CLINICAL HISTORY:  Pain in left foot.  Gangrene, not elsewhere classified    TECHNIQUE:  AP, lateral and oblique views of the left foot were performed.    COMPARISON:  August 16, 2018    FINDINGS:  There is been interval bone loss involving the distal 1st phalanx, with overlying soft tissue defect.  Healing fracture of the 2nd proximal phalanx again seen.  There has been progressive soft tissue loss overlying the 4th toe and there are air bubbles in the 5th toe soft tissues.  No other bone loss is seen.    Plantar and Achilles calcaneal spurs.  Osteopenia.  Vascular calcifications.  Accessory ossicle adjacent to the navicular bone.                               CT Renal Stone Study ABD Pelvis WO (Final result)  Result time 09/18/18 18:31:38    Final result by Nicolas Sawyer MD (09/18/18 18:31:38)                 Impression:      1.  Negative for acute process involving the abdomen or pelvis.  Negative for inflammatory changes.  Normal appendix.  Negative for renal stone disease or hydronephrosis, in this patient who has atrophic native kidneys and a right lower quadrant transplant kidney.    2. Again seen is mild prominence of the transplant kidney pelvocaliceal system, without dilation of the ureter, likely a chronic finding.    3.  Mild distention of the gallbladder, which is otherwise normal, nonspecific finding.    4.  Fluid within the nondilated colon, which can be seen in patients with diarrhea or gastroenteritis.  Clinical correlation is advised.    5.  Dependent atelectasis in the lung bases.    6.  Moderate  sliding-type hiatal hernia with possible distal esophageal thickening.  Does the patient have symptoms of esophagitis?    7.  Stable findings as noted above.    All CT scans at this facility are performed  using dose modulation techniques as appropriate to performed exam including the following:  automated exposure control; adjustment of mA and/or kV according to the patients size (this includes techniques or standardized protocols for targeted exams where dose is matched to indication/reason for exam: i.e. extremities or head);  iterative reconstruction technique.      Electronically signed by: Nicolas Sawyer MD  Date:    09/18/2018  Time:    18:31             Narrative:    EXAMINATION:  CT RENAL STONE STUDY ABD PELVIS WO    CLINICAL HISTORY:  Flank pain, stone disease suspected;    TECHNIQUE:  Axial images through the abdomen and pelvis were obtained without the use of IV contrast.  Sagittal and coronal reconstructions are provided for review.  Oral contrast was not utilized.    COMPARISON:  December 11, 2016    FINDINGS:  LUNG BASES: Respiratory motion artifact is present on a number of images.  Subtle abnormalities could be overlooked.  Stable scarring or atelectasis in the inferior lingula and middle lobe.  Lung bases are otherwise clear.  Negative for pleural effusions.  There is a small amount of pericardial fluid.  Coronary artery calcifications.  Moderate size hiatal hernia with thickening of the distal esophagus.    ABDOMEN: The gallbladder is mildly distended.  No surrounding inflammatory changes or wall thickening is seen.  Bilateral perinephric stranding densities noted, with mild atrophy of both kidneys again seen.  Vascular calcifications within the spleen.  Again seen is mild prominence of the biliary tree measuring up to 8 mm.  The liver, spleen and gallbladder otherwise appear normal.  The pancreas is unremarkable.  Kidneys and adrenal glands are otherwise normal.  Negative for hydronephrosis or  renal stone disease.  Significant vascular calcifications are present within the renal pelves, with bilateral renal sinus lipomatosis again seen.    There remains to be a transplant kidney in the right anterior hemipelvis.  Mild prominence of the renal collecting system is again seen.  Negative for perinephric stranding densities.  Negative for definite renal stones.    Negative for adenopathy, free fluid or inflammatory change noted within the abdomen or pelvis.  Vascular calcifications are present without aneurysmal changes, with the aorta measuring a maximum of 2.9 cm.    The small bowel appears normal. Normal appendix.  There is fluid within the proximal and mid colon, without dilation or surrounding inflammatory changes.    PELVIS: The urinary bladder is mildly thick wall, a stable finding.  Prostate calcifications.  The   pelvic organs are breast of e-mail.  There are pelvic phleboliths.    No significant osseous abnormality is identified.  Negative for significant spinal canal stenosis. Similar degree of degenerative disc changes throughout the thoracic and lumbar spine, most significantly involving L3/L4.    Negative for groin adenopathy.    Postoperative changes involve the right pelvic abdominal wall from the transplant kidney placement.  The abdominal wall is intact.                               X-Ray Chest 1 View (Final result)  Result time 09/18/18 17:03:13    Final result by ANA CRISTINA Joseph Sr., MD (09/18/18 17:03:13)                 Impression:      1. There is a mild amount of haziness in the lateral aspect of the base of the left hemithorax.  This is characteristic of atelectasis or subtle pneumonia.  2. The left costophrenic angle is blunted.  This is characteristic of a tiny pleural effusion.  3. There is a mild amount of levoconvex curvature of the thoracic spine.  4. There is mild cardiomegaly.  .      Electronically signed by: Porter Joseph MD  Date:    09/18/2018  Time:    17:03              Narrative:    EXAMINATION:  XR CHEST 1 VIEW    CLINICAL HISTORY:  Adult failure to thrive    COMPARISON:  2018    FINDINGS:  The size of the heart is prominent.  There is a mild amount of haziness in the lateral aspect of the base of the left hemithorax.  The right lung is clear.  There is no pneumothorax.  The left costophrenic angle is blunted.  The right costophrenic angle is sharp.  There is a mild amount of levoconvex curvature of the thoracic spine.                                  Assessment/Plan:     Hypophosphatemia    S/p IV Na-phosphate. Hypophosphatemia has resolved.         Gangrene of left foot    S/p left TMA on 18.     S/p wound closure and tendoachilles lengthening on 18.     Podiatry is following.         Osteomyelitis of left foot    Continue antibiotics.         Chronic kidney disease (CKD), stage III (moderate)    Patient with CKD 3. Creatinine has stabilized at 1.7.           donor kidney transplant for DM 16    Patient's renal function has stabilized with creatinine at 1.7.     Continue immunosuppression with Prograf, prednisone.     Will monitor closely with repeat renal panel.    Tacrolimus at goal.             Thank you for your consult. I will follow-up with patient. Please contact us if you have any additional questions.    Bud Tam MD  Nephrology  [facility] -

## 2018-09-25 NOTE — PT/OT/SLP PROGRESS
Occupational Therapy  Treatment    Lavelle Ladd   MRN: [id number]   Admitting Diagnosis: Gas gangrene of foot    OT Date of Treatment: 09/25/18   OT Start Time: 1025  OT Stop Time: 1050  OT Total Time (min): 25 min    Billable Minutes:  Therapeutic Activity 25 minutes    General Precautions: Standard, fall  Orthopedic Precautions: LLE non weight bearing  Braces: N/A         Subjective:  Communicated with Dr Proctor and [facility] chart review prior to session.    Pain/Comfort  Pain Rating 1: 8/10  Location - Side 1: Right  Location - Orientation 1: lower  Location 1: rib(s)    Objective:  Patient found with: telemetry, peripheral IV     Functional Mobility:  Bed Mobility:   sba    Transfers:    sba non compliant with prothesis    Functional Ambulation: pt ambulated o8 feet to ret room not adhering to weight bearing precautions    Activities of Daily Living:     Feeding adaptive equipment: na     UE adaptive equipment: na     LE adaptive equipment: sba with sudhir prothesis                    Bathing adaptive equipment: na    Balance:   Static Sit: GOOD+: Takes MAXIMAL challenges from all directions.    Dynamic Sit: GOOD+: Maintains balance through MAXIMAL excursions of active trunk motion  Static Stand: FAIR+: Takes MINIMAL challenges from all directions  Dynamic stand: FAIR+: Needs CLOSE SUPERVISION during gait and is able to right self with minor LOB    Therapeutic Activities and Exercises:  Pt seen in room. Pt requested toileting. Pt req sba with bed mobility and sit<>stand from EOB. Pt ambulated to restroom non compliant with weight bearing precautions. Pt refused assistance and req max encouragement to participate with therapy session. Pt is non compliant with Non-weight bearing on left le. Pt left in restroom with all needs met and call button in reach . Nurse notified    AM-PAC 6 CLICK ADL   How much help from another person does this patient currently need?   1 = Unable, Total/Dependent Assistance  2 = A lot,  "Maximum/Moderate Assistance  3 = A little, Minimum/Contact Guard/Supervision  4 = None, Modified [place]/Independent    Putting on and taking off regular lower body clothing? : 3  Bathing (including washing, rinsing, drying)?: 3  Toileting, which includes using toilet, bedpan, or urinal? : 3  Putting on and taking off regular upper body clothing?: 3  Taking care of personal grooming such as brushing teeth?: 4  Eating meals?: 4  Daily Activity Total Score: 20     AM-PAC Raw Score CMS "G-Code Modifier Level of Impairment Assistance   6 % Total / Unable   7 - 8 CM 80 - 100% Maximal Assist   9-13 CL 60 - 80% Moderate Assist   14 - 19 CK 40 - 60% Moderate Assist   20 - 22 CJ 20 - 40% Minimal Assist   23 CI 1-20% SBA / CGA   24 CH 0% Independent/ Mod I       Patient left left sitting on toilet with all lines intact, call button in reach and nurse notified    ASSESSMENT:  Lavelle Ladd is a 63 y.o. male with a medical diagnosis of Gas gangrene of foot and presents with debility and generalized weakness. Pt will continue to benefit from skilled O.T.    Rehab identified problem list/impairments: Rehab identified problem list/impairments: weakness, impaired endurance, impaired self care skills, impaired functional mobilty, gait instability, impaired balance, pain    Rehab potential is fair.    Activity tolerance: Fair    Discharge recommendations: Discharge Facility/Level Of Care Needs: nursing facility, skilled     Barriers to discharge: Barriers to Discharge: Decreased caregiver support    Equipment recommendations: none     GOALS:   Multidisciplinary Problems     Occupational Therapy Goals        Problem: Occupational Therapy Goal    Goal Priority Disciplines Outcome Interventions   Occupational Therapy Goal     OT, PT/OT Ongoing (interventions implemented as appropriate)    Description:  OT goals to be met by 9-27-18  sba with ue dressing  Pt will tolerate 1 set x 15 reps b ue rom exercise with min " resistance  sba with bsc t/f's.                     Plan:  Patient to be seen 3 x/week to address the above listed problems via self-care/home management, therapeutic activities, therapeutic exercises  Plan of Care expires: 09/20/18  Plan of Care reviewed with: patient         Lola Quintero, OT  09/25/2018

## 2018-09-25 NOTE — ASSESSMENT & PLAN NOTE
S/p left TMA on 9/21/18.     S/p wound closure and tendoachilles lengthening on 9/24/18.     Podiatry is following.

## 2018-09-25 NOTE — SUBJECTIVE & OBJECTIVE
Interval History:  63 year old man with history of MSSA bacteremia  ,osteomyelitis , cultures from the foot -MSSA.  He has persistent pain over the right chest wall in the back.  Review of Systems   Constitutional: Negative for chills, diaphoresis, fatigue and fever.   HENT: Negative for congestion, postnasal drip, rhinorrhea and sore throat.    Eyes: Negative for pain and visual disturbance.   Respiratory: Negative for cough, shortness of breath and wheezing.    Cardiovascular: Negative for chest pain, palpitations and leg swelling.   Gastrointestinal: Negative for abdominal distention, abdominal pain, constipation, diarrhea, nausea and vomiting.   Endocrine: Negative for cold intolerance and heat intolerance.   Genitourinary: Negative for difficulty urinating, dysuria and hematuria.   Musculoskeletal: Positive for back pain.   Skin: Positive for wound.   Allergic/Immunologic: Positive for immunocompromised state.   Neurological: Negative for dizziness, syncope, speech difficulty, weakness, light-headedness and headaches.   Hematological: Negative.    Psychiatric/Behavioral: Negative for agitation, behavioral problems and confusion.     Objective:     Vital Signs (Most Recent):  Temp: 98.5 °F (36.9 °C) (09/24/18 1945)  Pulse: 66 (09/24/18 2014)  Resp: 18 (09/24/18 2014)  BP: (!) 176/85 (09/24/18 1945)  SpO2: 95 % (09/24/18 2014) Vital Signs (24h Range):  Temp:  [98.5 °F (36.9 °C)-98.9 °F (37.2 °C)] 98.5 °F (36.9 °C)  Pulse:  [58-70] 66  Resp:  [16-20] 18  SpO2:  [0 %-95 %] 95 %  BP: (131-212)/() 176/85     Weight: 93 kg (205 lb 0.4 oz)  Body mass index is 28.6 kg/m².    Estimated Creatinine Clearance: 53.6 mL/min (A) (based on SCr of 1.6 mg/dL (H)).    Physical Exam   Constitutional: He is oriented to person, place, and time. He appears well-developed. He is cooperative.   HENT:   Head: Normocephalic.   Nose: No rhinorrhea.   Mouth/Throat: Mucous membranes are normal. No oropharyngeal exudate.   Eyes: Pupils  are equal, round, and reactive to light.   Neck: No thyroid mass present.   Cardiovascular: Normal rate, regular rhythm, S1 normal, S2 normal and intact distal pulses.   Pulmonary/Chest: Effort normal. No respiratory distress. He has no wheezes.   Abdominal: Soft. Bowel sounds are normal. He exhibits no distension. There is no tenderness. No hernia.   Musculoskeletal: Tenderness: lastpt.   S/p left TMA.    S/p right BKA.   Lymphadenopathy:     He has no cervical adenopathy.   Neurological: He is alert and oriented to person, place, and time.   Skin: Skin is warm and dry.   Psychiatric: He has a normal mood and affect.   Nursing note and vitals reviewed.      Significant Labs:   Blood Culture:   Recent Labs   Lab  07/20/18   1415  09/18/18   1800  09/18/18   1812  09/21/18   0907  09/21/18   1516   LABBLOO  No growth after 5 days.  Gram stain susan bottle: Gram positive cocci in clusters resembling Staph   Results called to and read back by:NOA Eckert RN 09/19/2018  15:30  Gram stain aer bottle: Gram positive cocci in clusters resembling Staph   09/19/2018  15:56  STAPHYLOCOCCUS AUREUS  ID consult required at [facility] locations.    Gram stain aer bottle: Gram positive cocci in clusters resembling Staph   Gram stain susan bottle: Gram positive cocci in clusters resembling Staph   Results called to and read back by:NOA Eckert RN 09/19/2018  15:31  STAPHYLOCOCCUS AUREUS  ID consult required at [facility].  For susceptibility see order # [id number]    No Growth to date  No Growth to date  No Growth to date  No Growth to date  No Growth to date  No Growth to date  No Growth to date     BMP:   Recent Labs   Lab  09/24/18   0503   GLU  216*   NA  136   K  3.8   CL  101   CO2  23   BUN  25*   CREATININE  1.6*   CALCIUM  8.7     CBC:   Recent Labs   Lab  09/23/18   0553  09/24/18   0503   WBC  8.75  8.50   HGB  12.7*  12.5*   HCT  39.3*  38.7*   PLT  292  333     All  pertinent labs within the past 24 hours have been reviewed.    Significant Imaging: I have reviewed all pertinent imaging results/findings within the past 24 hours.

## 2018-09-26 VITALS
RESPIRATION RATE: 20 BRPM | HEART RATE: 64 BPM | HEIGHT: 71 IN | SYSTOLIC BLOOD PRESSURE: 131 MMHG | DIASTOLIC BLOOD PRESSURE: 58 MMHG | WEIGHT: 205 LBS | BODY MASS INDEX: 28.7 KG/M2 | OXYGEN SATURATION: 93 % | TEMPERATURE: 97 F

## 2018-09-26 PROBLEM — S22.31XA FRACTURE OF ONE RIB OF RIGHT SIDE: Status: ACTIVE | Noted: 2018-09-26

## 2018-09-26 PROBLEM — R78.81 MSSA BACTEREMIA: Status: ACTIVE | Noted: 2018-09-26

## 2018-09-26 PROBLEM — E83.39 HYPOPHOSPHATEMIA: Status: RESOLVED | Noted: 2018-09-23 | Resolved: 2018-09-26

## 2018-09-26 PROBLEM — M86.9 OSTEOMYELITIS OF LEFT FOOT: Status: RESOLVED | Noted: 2018-09-19 | Resolved: 2018-09-26

## 2018-09-26 PROBLEM — B95.61 MSSA BACTEREMIA: Status: ACTIVE | Noted: 2018-09-26

## 2018-09-26 LAB
ANION GAP SERPL CALC-SCNC: 12 MMOL/L
BACTERIA BLD CULT: NORMAL
BACTERIA BLD CULT: NORMAL
BACTERIA SPEC ANAEROBE CULT: NORMAL
BASOPHILS # BLD AUTO: 0.01 K/UL
BASOPHILS NFR BLD: 0.1 %
BUN SERPL-MCNC: 26 MG/DL
CALCIUM SERPL-MCNC: 9.2 MG/DL
CHLORIDE SERPL-SCNC: 102 MMOL/L
CO2 SERPL-SCNC: 22 MMOL/L
CREAT SERPL-MCNC: 1.7 MG/DL
DIFFERENTIAL METHOD: ABNORMAL
EOSINOPHIL # BLD AUTO: 0.1 K/UL
EOSINOPHIL NFR BLD: 1.8 %
ERYTHROCYTE [DISTWIDTH] IN BLOOD BY AUTOMATED COUNT: 14.1 %
EST. GFR  (AFRICAN AMERICAN): 48 ML/MIN/1.73 M^2
EST. GFR  (NON AFRICAN AMERICAN): 41 ML/MIN/1.73 M^2
GLUCOSE SERPL-MCNC: 199 MG/DL
HCT VFR BLD AUTO: 38.2 %
HGB BLD-MCNC: 12.1 G/DL
LYMPHOCYTES # BLD AUTO: 1.7 K/UL
LYMPHOCYTES NFR BLD: 21.1 %
MCH RBC QN AUTO: 28.2 PG
MCHC RBC AUTO-ENTMCNC: 31.7 G/DL
MCV RBC AUTO: 89 FL
MONOCYTES # BLD AUTO: 1 K/UL
MONOCYTES NFR BLD: 11.9 %
NEUTROPHILS # BLD AUTO: 5.2 K/UL
NEUTROPHILS NFR BLD: 65.1 %
PHOSPHATE SERPL-MCNC: 3.2 MG/DL
PLATELET # BLD AUTO: 364 K/UL
PMV BLD AUTO: 10 FL
POCT GLUCOSE: 186 MG/DL (ref 70–110)
POCT GLUCOSE: 209 MG/DL (ref 70–110)
POTASSIUM SERPL-SCNC: 4.2 MMOL/L
RBC # BLD AUTO: 4.29 M/UL
SODIUM SERPL-SCNC: 136 MMOL/L
WBC # BLD AUTO: 8 K/UL

## 2018-09-26 PROCEDURE — 63600175 PHARM REV CODE 636 W HCPCS: Performed by: NURSE PRACTITIONER

## 2018-09-26 PROCEDURE — 36415 COLL VENOUS BLD VENIPUNCTURE: CPT

## 2018-09-26 PROCEDURE — 94640 AIRWAY INHALATION TREATMENT: CPT

## 2018-09-26 PROCEDURE — 25000242 PHARM REV CODE 250 ALT 637 W/ HCPCS: Performed by: NURSE PRACTITIONER

## 2018-09-26 PROCEDURE — 25000003 PHARM REV CODE 250: Performed by: INTERNAL MEDICINE

## 2018-09-26 PROCEDURE — 84100 ASSAY OF PHOSPHORUS: CPT

## 2018-09-26 PROCEDURE — 80048 BASIC METABOLIC PNL TOTAL CA: CPT

## 2018-09-26 PROCEDURE — 25000003 PHARM REV CODE 250: Performed by: NURSE PRACTITIONER

## 2018-09-26 PROCEDURE — 63600175 PHARM REV CODE 636 W HCPCS: Performed by: INTERNAL MEDICINE

## 2018-09-26 PROCEDURE — 96372 THER/PROPH/DIAG INJ SC/IM: CPT

## 2018-09-26 PROCEDURE — 85025 COMPLETE CBC W/AUTO DIFF WBC: CPT

## 2018-09-26 PROCEDURE — 94761 N-INVAS EAR/PLS OXIMETRY MLT: CPT

## 2018-09-26 RX ADMIN — BUDESONIDE 0.5 MG: 0.5 SUSPENSION RESPIRATORY (INHALATION) at 07:09

## 2018-09-26 RX ADMIN — CEFAZOLIN SODIUM 2 G: 2 SOLUTION INTRAVENOUS at 12:09

## 2018-09-26 RX ADMIN — PANTOPRAZOLE SODIUM 40 MG: 40 TABLET, DELAYED RELEASE ORAL at 08:09

## 2018-09-26 RX ADMIN — SODIUM BICARBONATE 650 MG TABLET 2600 MG: at 08:09

## 2018-09-26 RX ADMIN — ARFORMOTEROL TARTRATE 15 MCG: 15 SOLUTION RESPIRATORY (INHALATION) at 07:09

## 2018-09-26 RX ADMIN — HYDROCODONE BITARTRATE AND ACETAMINOPHEN 1 TABLET: 10; 325 TABLET ORAL at 08:09

## 2018-09-26 RX ADMIN — HYDROCODONE BITARTRATE AND ACETAMINOPHEN 1 TABLET: 10; 325 TABLET ORAL at 01:09

## 2018-09-26 RX ADMIN — TACROLIMUS 1.5 MG: 1 CAPSULE ORAL at 08:09

## 2018-09-26 RX ADMIN — PREDNISONE 5 MG: 5 TABLET ORAL at 08:09

## 2018-09-26 RX ADMIN — MORPHINE SULFATE 3 MG: 4 INJECTION INTRAVENOUS at 05:09

## 2018-09-26 RX ADMIN — ASPIRIN 81 MG: 81 TABLET, COATED ORAL at 08:09

## 2018-09-26 RX ADMIN — CEFAZOLIN SODIUM 2 G: 2 SOLUTION INTRAVENOUS at 08:09

## 2018-09-26 RX ADMIN — GABAPENTIN 100 MG: 100 CAPSULE ORAL at 08:09

## 2018-09-26 RX ADMIN — METOPROLOL TARTRATE 25 MG: 25 TABLET ORAL at 08:09

## 2018-09-26 RX ADMIN — AMLODIPINE BESYLATE 5 MG: 5 TABLET ORAL at 08:09

## 2018-09-26 NOTE — PT/OT/SLP PROGRESS
"Physical Therapy      Patient Name:  Lavelle Ladd   MRN:  [id number]     ATTEMPTED P.T. TX THIS AM, PT REFUSED DUE TO "ABOUT TO D/C HOME", NOTIFIED NURSE AVERY Arango, PT   9/26/2018  0915    "

## 2018-09-26 NOTE — ASSESSMENT & PLAN NOTE
-Repeat blood cultures 9/21/18 were negative.   -Cefazolin per ID.   -Outpatient IV infusion arranged.

## 2018-09-26 NOTE — PLAN OF CARE
Problem: Infection, Risk/Actual (Adult)  Intervention: Prevent Infection/Maximize Resistance  Patient awake and alert free from falls and injury neuro checks in progress, normal sinus rhythm on monitor, pain controlled with prn medication, weight shift assistance provided, dressing clean, dry and  intact to left leg and foot,  Right BKA with prosthetic, ABT continues,  POC reviewed with patient,  bed low locked, call light within reach, will continue to monitor

## 2018-09-26 NOTE — ASSESSMENT & PLAN NOTE
Continue insulin sliding scale , diabetic diet   Closely follow primary team.    09/25-will continue accuchecks, insulin sliding scale.

## 2018-09-26 NOTE — PLAN OF CARE
09/26/18 0835   Medicare Message   Important Message from Medicare regarding Discharge Appeal Rights Given to patient/caregiver;Signed/date by patient/caregiver;Explained to patient/caregiver   Date IMM was signed 09/26/18   Time IMM was signed 0835

## 2018-09-26 NOTE — PLAN OF CARE
Patient discharge order dated 9/25. Per physician patient did not obtain a picc line needed for home infusion until late last evening. Will discharge today. IMM given.       09/26/18 0836   Discharge Reassessment   Assessment Type Discharge Planning Reassessment   Do you have any problems affording any of your prescribed medications? No   Discharge Plan A Home;Home with family;Home Health   Patient choice form signed by patient/caregiver Yes   Can the patient answer the patient profile reliably? Yes, cognitively intact   How does the patient rate their overall health at the present time? Fair   Describe the patient's ability to walk at the present time. Walks with the help of equipment   How often would a person be available to care for the patient? Often   Number of comorbid conditions (as recorded on the chart) Five or more   During the past month, has the patient often been bothered by feeling down, depressed or hopeless? Yes

## 2018-09-26 NOTE — SUBJECTIVE & OBJECTIVE
Interval History: Plans for PICC placement today.     Review of Systems   Constitutional: Positive for activity change and fatigue. Negative for appetite change and fever.   HENT: Negative.  Negative for congestion, sinus pressure and sore throat.    Eyes: Negative.  Negative for photophobia, discharge and visual disturbance.   Respiratory: Negative.  Negative for cough, chest tightness, shortness of breath and wheezing.    Cardiovascular: Negative.  Negative for chest pain and palpitations.   Gastrointestinal: Negative.  Negative for abdominal pain, blood in stool, constipation, diarrhea, nausea and vomiting.   Endocrine: Negative.    Genitourinary: Negative.    Musculoskeletal: Positive for myalgias. Negative for neck pain and neck stiffness.   Skin: Positive for wound.   Allergic/Immunologic: Negative.    Neurological: Positive for weakness. Negative for seizures, syncope and headaches.   Hematological: Negative.    Psychiatric/Behavioral: Negative.  Negative for agitation, behavioral problems, hallucinations, self-injury and suicidal ideas.     Objective:     Vital Signs (Most Recent):  Temp: 96.6 °F (35.9 °C) (09/26/18 0736)  Pulse: 64 (09/26/18 0900)  Resp: 20 (09/26/18 0736)  BP: (!) 131/58 (09/26/18 0736)  SpO2: (!) 93 % (09/26/18 0736) Vital Signs (24h Range):  Temp:  [96.6 °F (35.9 °C)-99.1 °F (37.3 °C)] 96.6 °F (35.9 °C)  Pulse:  [62-69] 64  Resp:  [18-20] 20  SpO2:  [91 %-98 %] 93 %  BP: (131-161)/(58-87) 131/58     Weight: 93 kg (205 lb 0.4 oz)  Body mass index is 28.6 kg/m².    Intake/Output Summary (Last 24 hours) at 9/26/2018 1006  Last data filed at 9/26/2018 0150  Gross per 24 hour   Intake --   Output 300 ml   Net -300 ml      Physical Exam   Constitutional: He is oriented to person, place, and time. He appears well-developed and well-nourished.   HENT:   Head: Normocephalic and atraumatic.   Eyes: EOM are normal. Pupils are equal, round, and reactive to light.   Neck: Normal range of motion. Neck  supple.   Cardiovascular: Normal rate, regular rhythm and intact distal pulses.   Murmur heard.   Systolic murmur is present with a grade of 3/6.  Pulmonary/Chest: Effort normal and breath sounds normal. No respiratory distress. He has no wheezes.   Abdominal: Soft. Bowel sounds are normal.   Musculoskeletal: Normal range of motion. He exhibits no deformity.        Legs:  Feet:   Left Foot: amputated  Neurological: He is alert and oriented to person, place, and time. He has normal reflexes.   Skin: Skin is warm and dry. Capillary refill takes 2 to 3 seconds.   Psychiatric: He has a normal mood and affect. His behavior is normal. Judgment and thought content normal.   Nursing note and vitals reviewed.      Significant Labs:   CBC:   Recent Labs   Lab  09/25/18   0536  09/26/18   0503   WBC  8.03  8.00   HGB  12.2*  12.1*   HCT  37.9*  38.2*   PLT  348  364*     CMP:   Recent Labs   Lab  09/25/18   0536  09/26/18   0503   NA  135*  136   K  3.9  4.2   CL  100  102   CO2  22*  22*   GLU  167*  199*   BUN  23  26*   CREATININE  1.7*  1.7*   CALCIUM  8.8  9.2   ANIONGAP  13  12   EGFRNONAA  41*  41*     All pertinent labs within the past 24 hours have been reviewed.    Significant Imaging: I have reviewed all pertinent imaging results/findings within the past 24 hours.

## 2018-09-26 NOTE — ASSESSMENT & PLAN NOTE
S/p TMA -will need therapy for 2 weeks for MSSA bacteremia   Will continue IV Zosyn  .  He had interval TMA.      09/24- will follow final cultures and will switch to IV Cefazolin in am .'All cultures -blood and from the foot -MSSA.    09/25- will plan to complete 2 weeks of IV Cefazolin -  Day 4/14.  Will follow final pathology results form the TMA.

## 2018-09-26 NOTE — PROGRESS NOTES
[facility]  Infectious Disease  Progress Note    Patient Name: Lavelle Ladd  MRN: [id number]  Admission Date: 9/18/2018  Length of Stay: 8 days  Attending Physician: Chapin Proctor MD  Primary Care Provider: Rishabh Esteban MD    Isolation Status: No active isolations  Assessment/Plan:      Fracture of one rib of right side    Supportive care , generous analgesia ,incentive spirometry , ortho/primary team follow up         Osteomyelitis of left foot    S/p TMA -will need therapy for 2 weeks for MSSA bacteremia   Will continue IV Zosyn  .  He had interval TMA.      09/24- will follow final cultures and will switch to IV Cefazolin in am .'All cultures -blood and from the foot -MSSA.    09/25- will plan to complete 2 weeks of IV Cefazolin -  Day 4/14.  Will follow final pathology results form the TMA.        Type 2 diabetes mellitus with retinopathy, with long-term current use of insulin    Continue insulin sliding scale , diabetic diet   Closely follow primary team.    09/25-will continue accuchecks, insulin sliding scale.            Anticipated Disposition:     Thank you for your consult. I will follow-up with patient. Please contact us if you have any additional questions.    Ronny Veliz MD  Infectious Disease  [facility]    Subjective:     Principal Problem:Gas gangrene of foot    HPI:  63 year old man s/p  kidney transplant patient with hx of HTN, CAD, diastolic heart failure, Type 2 DM with diabetic neuropathy, ESRD, PVD, COPD who was brought to ED via EMS secondary to failure to thrive .He was noted to be covered in human and dog feces when he seen by EMS  Since admission, imaging test showed 1.  Progressive erosive changes with bone loss involving the 1st distal phalanx concerning for osteomyelitis.2.  Air bubbles within the left 5th toe, concerning for possible infectious process or gangrene.3.  Progressive soft tissue loss involving the 4th toe, which could also  indicate gangrene.    Since admission, he was seen by podiatry and had  open TMA left lower extremity with gangrene and purulent drainage noted intraoperatively..  Blood cultures are positive for MSSA.    Interval History:  63 year old man with history of MSSA bacteremia  ,osteomyelitis , cultures from the foot -MSSA.  He has persistent pain over the right chest wall in the back.    09/25-  Rib series showed acute fracture of the posterolateral right 10th rib.  Blood culture has remained negative.  Review of Systems   Constitutional: Negative for chills, diaphoresis, fatigue and fever.   HENT: Negative for congestion, postnasal drip, rhinorrhea and sore throat.    Eyes: Negative for pain and visual disturbance.   Respiratory: Negative for cough, shortness of breath and wheezing.    Cardiovascular: Negative for chest pain, palpitations and leg swelling.   Gastrointestinal: Negative for abdominal distention, abdominal pain, constipation, diarrhea, nausea and vomiting.   Endocrine: Negative for cold intolerance and heat intolerance.   Genitourinary: Negative for difficulty urinating, dysuria and hematuria.   Musculoskeletal: Positive for back pain.   Skin: Positive for wound.   Allergic/Immunologic: Positive for immunocompromised state.   Neurological: Negative for dizziness, syncope, speech difficulty, weakness, light-headedness and headaches.   Hematological: Negative.    Psychiatric/Behavioral: Negative for agitation, behavioral problems and confusion.     Objective:     Vital Signs (Most Recent):  Temp: 98.8 °F (37.1 °C) (09/26/18 0356)  Pulse: 64 (09/26/18 0705)  Resp: 18 (09/26/18 0705)  BP: (!) 161/84 (09/26/18 0356)  SpO2: 95 % (09/26/18 0705) Vital Signs (24h Range):  Temp:  [97.8 °F (36.6 °C)-99.1 °F (37.3 °C)] 98.8 °F (37.1 °C)  Pulse:  [62-69] 64  Resp:  [16-18] 18  SpO2:  [91 %-100 %] 95 %  BP: (135-161)/(65-87) 161/84     Weight: 93 kg (205 lb 0.4 oz)  Body mass index is 28.6 kg/m².    Estimated  Creatinine Clearance: 50.5 mL/min (A) (based on SCr of 1.7 mg/dL (H)).    Physical Exam   Constitutional: He is oriented to person, place, and time. He appears well-developed. He is cooperative.   HENT:   Head: Normocephalic.   Nose: No rhinorrhea.   Mouth/Throat: Mucous membranes are normal. No oropharyngeal exudate.   Eyes: Pupils are equal, round, and reactive to light.   Neck: No thyroid mass present.   Cardiovascular: Normal rate, regular rhythm, S1 normal, S2 normal and intact distal pulses.   Pulmonary/Chest: Effort normal. No respiratory distress. He has no wheezes.   Abdominal: Soft. Bowel sounds are normal. He exhibits no distension. There is no tenderness. No hernia.   Musculoskeletal: Tenderness: lastpt.   S/p left TMA.    S/p right BKA.   Lymphadenopathy:     He has no cervical adenopathy.   Neurological: He is alert and oriented to person, place, and time.   Skin: Skin is warm and dry.   Psychiatric: He has a normal mood and affect.   Nursing note and vitals reviewed.      Significant Labs:   Blood Culture:   Recent Labs   Lab  07/20/18   1415  09/18/18   1800  09/18/18   1812  09/21/18   0907  09/21/18   1516   LABBLOO  No growth after 5 days.  Gram stain susan bottle: Gram positive cocci in clusters resembling Staph   Results called to and read back by:NOA Eckert RN 09/19/2018  15:30  Gram stain aer bottle: Gram positive cocci in clusters resembling Staph   09/19/2018  15:56  STAPHYLOCOCCUS AUREUS  [facility]    Gram stain aer bottle: Gram positive cocci in clusters resembling Staph   Gram stain susan bottle: Gram positive cocci in clusters resembling Staph   Results called to and read back by:NOA Eckert RN 09/19/2018  15:31  STAPHYLOCOCCUS AUREUS  ID consult required at [facility].  For susceptibility see order # [id number]    No Growth to date  No Growth to date  No Growth to date  No Growth to date  No Growth  to date  No Growth to date  No Growth to date  No Growth to date  No Growth to date  No Growth to date     BMP:   Recent Labs   Lab  09/26/18   0503   GLU  199*   NA  136   K  4.2   CL  102   CO2  22*   BUN  26*   CREATININE  1.7*   CALCIUM  9.2     CBC:   Recent Labs   Lab  09/25/18   0536  09/26/18   0503   WBC  8.03  8.00   HGB  12.2*  12.1*   HCT  37.9*  38.2*   PLT  348  364*     All pertinent labs within the past 24 hours have been reviewed.    Significant Imaging: I have reviewed all pertinent imaging results/findings within the past 24 hours.

## 2018-09-26 NOTE — PROGRESS NOTES
[facility] Hospital Medicine  Progress Note    Patient Name: Lavelle Ladd  MRN: [id number]  Patient Class: IP- Inpatient   Admission Date: 9/18/2018  Length of Stay: 8 days  Attending Physician: Chapin Proctor MD  Primary Care Provider: Rishabh Esteban MD        Subjective:     Principal Problem:Gas gangrene of foot    HPI:  HPI limited due to patient confusion and obtained from Er records.  Laevlle Ladd is a 63 y.o. male kidney transplant patient with hx of HTN, CAD, diastolic heart failure, Type 2 DM with diabetic neuropathy, ESRD, PVD, COPD who was brought to ED via EMS secondary to failure to thrive which has been present for an unknown amount of time. EMS reported that there was human and dog feces in patient's room and that patient does not want to take his medications. His CBG was 348. Patient with lower foot wound to left. Patient evalauted in ER. WBC 16.72, Na 127, Anion 18, Cr. 1.9, Trop 0.036, ,  CT renal:1. Negative for acute process involving the abdomen or pelvis. Negative for inflammatory changes. Normal appendix. Negative for renal stone disease or hydronephrosis, in this patient who has atrophic native kidneys and a right lower quadrant transplant kidney.2. Again seen is mild prominence of the transplant kidney pelvocaliceal system, without dilation of the ureter, likely a chronic finding.3. Mild distention of the gallbladder, which is otherwise normal, nonspecific finding.4. Fluid within the nondilated colon, which can be seen in patients with diarrhea or gastroenteritis. Clinical correlation is advised.5. Dependent atelectasis in the lung bases.6. Moderate sliding-type hiatal hernia with possible distal esophageal thickening. Does the patient have symptoms of esophagitis?7. Stable findings as noted above.  Chest Xray1. There is a mild amount of haziness in the lateral aspect of the base of the left hemithorax.  This is characteristic of atelectasis or subtle  pneumonia.2. The left costophrenic angle is blunted.  This is characteristic of a tiny pleural effusion.3. There is a mild amount of levoconvex curvature of the thoracic spine.4. There is mild cardiomegaly. Xray left foot:1.  Progressive erosive changes with bone loss involving the 1st distal phalanx concerning for osteomyelitis.2.  Air bubbles within the left 5th toe, concerning for possible infectious process or gangrene.  Clinical correlation is advised.3.  Progressive soft tissue loss involving the 4th toe, which could also indicate gangrene.  Clinical correlation is advised. Hosptial medicine consulted. Patient admitted    \[facility]\"" Composed by Ryan Rae 9/19/18 0559    Hospital Course:  The patient was admitted to Telemetry with gas gangrene of the left foot under the care of Hospital Medicine. He was started on IV vancomycin and IV Zosyn. Vascular Surgery was consulted and recommended a left BKA. Podiatry was consulted who also recommended a left BKA, but the patient refused, instead agreeing to a left TMA. Blood cultures on 9/18/18 grew MSSA. Repeat blood cultures on 9/21/18 were negative. On 9/21/18, the patient had a TMA. On 9/22/18, the patient was ambulating on his TMA and advised of his non-weight bearing status. On 9/24/18, the patient underwent wound closure by Podiatry. SNF was recommended, but the patient declined and requests home health and home IV infusion. Infectious Disease recommended IV Cefazolin every 8 hours for 14 days for his MSSA gangrenous infection. Plans for PICC line placement today.       Interval History: Plans for PICC placement today.     Review of Systems   Constitutional: Positive for activity change and fatigue. Negative for appetite change and fever.   HENT: Negative.  Negative for congestion, sinus pressure and sore throat.    Eyes: Negative.  Negative for photophobia, discharge and visual disturbance.   Respiratory: Negative.  Negative for cough, chest tightness, shortness of  breath and wheezing.    Cardiovascular: Negative.  Negative for chest pain and palpitations.   Gastrointestinal: Negative.  Negative for abdominal pain, blood in stool, constipation, diarrhea, nausea and vomiting.   Endocrine: Negative.    Genitourinary: Negative.    Musculoskeletal: Positive for myalgias. Negative for neck pain and neck stiffness.   Skin: Positive for wound.   Allergic/Immunologic: Negative.    Neurological: Positive for weakness. Negative for seizures, syncope and headaches.   Hematological: Negative.    Psychiatric/Behavioral: Negative.  Negative for agitation, behavioral problems, hallucinations, self-injury and suicidal ideas.     Objective:     Vital Signs (Most Recent):  Temp: 96.6 °F (35.9 °C) (09/26/18 0736)  Pulse: 64 (09/26/18 0900)  Resp: 20 (09/26/18 0736)  BP: (!) 131/58 (09/26/18 0736)  SpO2: (!) 93 % (09/26/18 0736) Vital Signs (24h Range):  Temp:  [96.6 °F (35.9 °C)-99.1 °F (37.3 °C)] 96.6 °F (35.9 °C)  Pulse:  [62-69] 64  Resp:  [18-20] 20  SpO2:  [91 %-98 %] 93 %  BP: (131-161)/(58-87) 131/58     Weight: 93 kg (205 lb 0.4 oz)  Body mass index is 28.6 kg/m².    Intake/Output Summary (Last 24 hours) at 9/26/2018 1006  Last data filed at 9/26/2018 0150  Gross per 24 hour   Intake --   Output 300 ml   Net -300 ml      Physical Exam   Constitutional: He is oriented to person, place, and time. He appears well-developed and well-nourished.   HENT:   Head: Normocephalic and atraumatic.   Eyes: EOM are normal. Pupils are equal, round, and reactive to light.   Neck: Normal range of motion. Neck supple.   Cardiovascular: Normal rate, regular rhythm and intact distal pulses.   Murmur heard.   Systolic murmur is present with a grade of 3/6.  Pulmonary/Chest: Effort normal and breath sounds normal. No respiratory distress. He has no wheezes.   Abdominal: Soft. Bowel sounds are normal.   Musculoskeletal: Normal range of motion. He exhibits no deformity.        Legs:  Feet:   Left Foot:  amputated  Neurological: He is alert and oriented to person, place, and time. He has normal reflexes.   Skin: Skin is warm and dry. Capillary refill takes 2 to 3 seconds.   Psychiatric: He has a normal mood and affect. His behavior is normal. Judgment and thought content normal.   Nursing note and vitals reviewed.      Significant Labs:   CBC:   Recent Labs   Lab  18   0536  18   0503   WBC  8.03  8.00   HGB  12.2*  12.1*   HCT  37.9*  38.2*   PLT  348  364*     CMP:   Recent Labs   Lab  18   0536  18   0503   NA  135*  136   K  3.9  4.2   CL  100  102   CO2  22*  22*   GLU  167*  199*   BUN  23  26*   CREATININE  1.7*  1.7*   CALCIUM  8.8  9.2   ANIONGAP  13  12   EGFRNONAA  41*  41*     All pertinent labs within the past 24 hours have been reviewed.    Significant Imaging: I have reviewed all pertinent imaging results/findings within the past 24 hours.    Assessment/Plan:      Gangrene of left foot    -S/P TMA.   -Outpatient Podiatry follow up.   -Cefazolin per ID.           MSSA bacteremia    -Repeat blood cultures 18 were negative.   -Cefazolin per ID.   -Outpatient IV infusion arranged.          donor kidney transplant for DM 14    Nephrology following.         Fracture of one rib of right side    Supportive care.           PVD (peripheral vascular disease)    -Vascular and podiatry following  -S/P TMA with subsequent closure.   -Continue daily ASA.         Failure to thrive in adult    -S/P Dietary consult.   -SNF recommended, but patient refused.             Type 2 diabetes mellitus with retinopathy, with long-term current use of insulin    Continue Levemir and NISS.           Chronic kidney disease (CKD), stage III (moderate)    Nephrology following.           Essential hypertension    -Continue Norvasc and metoprolol.   -Hydralazine as needed.           VTE Risk Mitigation (From admission, onward)        Ordered     Place GOPI hose  Until discontinued      18  2020     Place sequential compression device  Until discontinued      09/18/18 2020     IP VTE HIGH RISK PATIENT  Once      09/18/18 2020              JUEVNAL Shay  Department of Hospital Medicine   [facility]

## 2018-09-26 NOTE — NURSING
Went over discharge instructions with patient.   Stressed importance of home health care services.  Patient verbalized understanding and has no questions in regards to discharge.  IV removed, catheter intact. PICC line in place.  Telemetry box removed.  Patient discharged to personal transportation via wheelchair.

## 2018-09-26 NOTE — SUBJECTIVE & OBJECTIVE
Interval History:  63 year old man with history of MSSA bacteremia  ,osteomyelitis , cultures from the foot -MSSA.  He has persistent pain over the right chest wall in the back.    09/25-  Rib series showed acute fracture of the posterolateral right 10th rib.  Blood culture has remained negative.  Review of Systems   Constitutional: Negative for chills, diaphoresis, fatigue and fever.   HENT: Negative for congestion, postnasal drip, rhinorrhea and sore throat.    Eyes: Negative for pain and visual disturbance.   Respiratory: Negative for cough, shortness of breath and wheezing.    Cardiovascular: Negative for chest pain, palpitations and leg swelling.   Gastrointestinal: Negative for abdominal distention, abdominal pain, constipation, diarrhea, nausea and vomiting.   Endocrine: Negative for cold intolerance and heat intolerance.   Genitourinary: Negative for difficulty urinating, dysuria and hematuria.   Musculoskeletal: Positive for back pain.   Skin: Positive for wound.   Allergic/Immunologic: Positive for immunocompromised state.   Neurological: Negative for dizziness, syncope, speech difficulty, weakness, light-headedness and headaches.   Hematological: Negative.    Psychiatric/Behavioral: Negative for agitation, behavioral problems and confusion.     Objective:     Vital Signs (Most Recent):  Temp: 98.8 °F (37.1 °C) (09/26/18 0356)  Pulse: 64 (09/26/18 0705)  Resp: 18 (09/26/18 0705)  BP: (!) 161/84 (09/26/18 0356)  SpO2: 95 % (09/26/18 0705) Vital Signs (24h Range):  Temp:  [97.8 °F (36.6 °C)-99.1 °F (37.3 °C)] 98.8 °F (37.1 °C)  Pulse:  [62-69] 64  Resp:  [16-18] 18  SpO2:  [91 %-100 %] 95 %  BP: (135-161)/(65-87) 161/84     Weight: 93 kg (205 lb 0.4 oz)  Body mass index is 28.6 kg/m².    Estimated Creatinine Clearance: 50.5 mL/min (A) (based on SCr of 1.7 mg/dL (H)).    Physical Exam   Constitutional: He is oriented to person, place, and time. He appears well-developed. He is cooperative.   HENT:   Head:  Normocephalic.   Nose: No rhinorrhea.   Mouth/Throat: Mucous membranes are normal. No oropharyngeal exudate.   Eyes: Pupils are equal, round, and reactive to light.   Neck: No thyroid mass present.   Cardiovascular: Normal rate, regular rhythm, S1 normal, S2 normal and intact distal pulses.   Pulmonary/Chest: Effort normal. No respiratory distress. He has no wheezes.   Abdominal: Soft. Bowel sounds are normal. He exhibits no distension. There is no tenderness. No hernia.   Musculoskeletal: Tenderness: lastpt.   S/p left TMA.    S/p right BKA.   Lymphadenopathy:     He has no cervical adenopathy.   Neurological: He is alert and oriented to person, place, and time.   Skin: Skin is warm and dry.   Psychiatric: He has a normal mood and affect.   Nursing note and vitals reviewed.      Significant Labs:   Blood Culture:   Recent Labs   Lab  07/20/18   1415  09/18/18   1800  09/18/18   1812  09/21/18   0907  09/21/18   1516   LABBLOO  No growth after 5 days.  Gram stain susan bottle: Gram positive cocci in clusters resembling Staph   Results called to and read back by:NOA Eckert RN 09/19/2018  15:30  Gram stain aer bottle: Gram positive cocci in clusters resembling Staph   09/19/2018  15:56  STAPHYLOCOCCUS AUREUS  ID consult required at [facility].    Gram stain aer bottle: Gram positive cocci in clusters resembling Staph   Gram stain susan bottle: Gram positive cocci in clusters resembling Staph   Results called to and read back by:NOA Eckert RN 09/19/2018  15:31  STAPHYLOCOCCUS AUREUS  ID consult required at [facility].  For susceptibility see order # [id number]    No Growth to date  No Growth to date  No Growth to date  No Growth to date  No Growth to date  No Growth to date  No Growth to date  No Growth to date  No Growth to date  No Growth to date     BMP:   Recent Labs   Lab  09/26/18   0503   GLU  199*   NA  136   K  4.2   CL  102   CO2  22*   BUN   26*   CREATININE  1.7*   CALCIUM  9.2     CBC:   Recent Labs   Lab  09/25/18   0536  09/26/18   0503   WBC  8.03  8.00   HGB  12.2*  12.1*   HCT  37.9*  38.2*   PLT  348  364*     All pertinent labs within the past 24 hours have been reviewed.    Significant Imaging: I have reviewed all pertinent imaging results/findings within the past 24 hours.

## 2018-09-26 NOTE — PLAN OF CARE
09/26/18 1329   Final Note   Assessment Type Final Discharge Note   Discharge Disposition Home-Health   [facility] Care Referral Info   Post Acute Recommendation Home-care   Facility Name [facility]         Notified Josephine at [facility] of patient's discharge today.  Patient discharge orders faxed via [employer] yesterday.

## 2018-09-28 ENCOUNTER — NURSE TRIAGE (OUTPATIENT)
Dept: ADMINISTRATIVE | Facility: CLINIC | Age: 63
End: 2018-09-28

## 2018-09-28 ENCOUNTER — PATIENT OUTREACH (OUTPATIENT)
Dept: ADMINISTRATIVE | Facility: CLINIC | Age: 63
End: 2018-09-28

## 2018-09-28 NOTE — TELEPHONE ENCOUNTER
Reason for Disposition   SEVERE difficulty breathing (e.g., struggling for each breath, speaks in single words, pulse > 120)    Protocols used: ST BREATHING DIFFICULTY-A-OH    I spoke with pt for a post discharge follow up call after his recent admission at [facility].  Patient seems very confused on the phone, speaking in single words and having difficult speaking.  I told pt to hang the phone and call 911.  Phone disconnected.  I called back in a few minutes and pt stated he did not fall 911 that he was just a little SOB.  I called [facility] and spoke with Linette MEDINA who is sending a nurse to the home to evaluate patient.  Patient refused disposition.

## 2018-09-28 NOTE — PATIENT INSTRUCTIONS
Understanding Sepsis  Sepsis is a severe response the body has to an infection. It is most often caused by bacteria. It is also known as septicemia, or systemic inflammatory response syndrome (SIRS). Sepsis is a medical emergency. It needs to be treated right away.  What is sepsis?  Sepsis is when the body reacts to an infection with a severe inflammatory response. It can be caused by bacteria, fungus, or a virus. Sepsis can cause many kinds of problems around the body. It can lead severe low blood pressure (shock) and organ failure. This can lead to death if not treated.  Sepsis is most common in:  · Infants and older adults  · People with an infection such as pneumonia, meningitis, or a urinary tract infection  · People who have an illness such as cancer, AIDS, or diabetes  · People being treated with chemotherapy medications or radiation  · People who have had a transplant  Symptoms of sepsis  Symptoms of sepsis can include:  · Chills and shaking  · Rapid heartbeat  · Rapid breathing  · Shortness of breath  · Severe nausea or uncontrolled vomiting  · Confusion  · Dizziness  · Decreased urination  · Severe pain, including in the back or joints   Diagnosing sepsis  If your health care provider thinks you may have sepsis, you will be given tests. You may have blood and urine tests. These are done to look for bacteria, viruses, or fungus. You may also have X-rays or other imaging tests. These may be done to look at your organs to locate the source of infection.  Treating sepsis  If you have sepsis, your health care provider will give you antibiotics through a thin, flexible tube put into a vein in your arm (IV). You will also be given fluids through the IV. You may also be given nutrition or other medications through your IV. Your health care provider will talk with you about other treatments you may need. These may include using an oxygen mask or a ventilator to help with breathing. Treatment may last at least 7  to 10 days. Sepsis must be treated in the hospital.  Date Last Reviewed: 7/15/2015  © 5833-6189 [employer]. [address] [place] [address], [place] [address]. All rights reserved. This information is not intended as a substitute for professional medical care. Always follow your healthcare professional's instructions.

## 2018-10-01 ENCOUNTER — HOSPITAL ENCOUNTER (EMERGENCY)
Facility: HOSPITAL | Age: 63
Discharge: HOME | End: 2018-10-01
Attending: FAMILY MEDICINE
Payer: MEDICARE

## 2018-10-01 VITALS
RESPIRATION RATE: 20 BRPM | OXYGEN SATURATION: 95 % | SYSTOLIC BLOOD PRESSURE: 132 MMHG | TEMPERATURE: 98 F | HEART RATE: 83 BPM | BODY MASS INDEX: 30.8 KG/M2 | WEIGHT: 220 LBS | DIASTOLIC BLOOD PRESSURE: 67 MMHG | HEIGHT: 71 IN

## 2018-10-01 DIAGNOSIS — R07.81 RIB PAIN: Primary | ICD-10-CM

## 2018-10-01 DIAGNOSIS — R07.9 CHEST PAIN: ICD-10-CM

## 2018-10-01 PROCEDURE — 25000003 PHARM REV CODE 250: Performed by: FAMILY MEDICINE

## 2018-10-01 PROCEDURE — 99283 EMERGENCY DEPT VISIT LOW MDM: CPT | Mod: 25

## 2018-10-01 PROCEDURE — 93010 ELECTROCARDIOGRAM REPORT: CPT | Mod: ,,, | Performed by: INTERNAL MEDICINE

## 2018-10-01 PROCEDURE — 93005 ELECTROCARDIOGRAM TRACING: CPT

## 2018-10-01 RX ORDER — HYDROCODONE BITARTRATE AND ACETAMINOPHEN 5; 325 MG/1; MG/1
2 TABLET ORAL
Status: COMPLETED | OUTPATIENT
Start: 2018-10-01 | End: 2018-10-01

## 2018-10-01 RX ADMIN — HYDROCODONE BITARTRATE AND ACETAMINOPHEN 2 TABLET: 5; 325 TABLET ORAL at 04:10

## 2018-10-01 NOTE — ED NOTES
Pt stated picc line was would not flush, nurse went in with 10 cc flush, and the line flushed with no problems with 10 cc normal saline

## 2018-10-01 NOTE — ED NOTES
Called to pt's room to speak with him regarding his discharge. I spoke with Carlee at [facility]/ She advised me that today their MSW was in contact with his PCP and to start  process of getting pt admitted into SNF. Pt is aggressive and argumentive. I advised him that he deemed medically clear by MD and that he had no life threatening illness that required intervention. Our MSW spoke with him today as well. Pt was provided transportation by cab which he subsequently refused in the lobby. Pt is still refusing to get out of bed. Security at bedside.

## 2018-10-01 NOTE — PLAN OF CARE
"Sw was consulted by ED to visit with pt. Sw met with pt at bedside. Pt reports he was recently discharged from [facility] with [facility]. Pt reports his nurse sent him to the ED to be admitted to Assisted Living. Sw explained Assisted living vs SNF with pt. Pt reports Snf was offered to him on his previous visit and he wants to go to SNF now. Sw reports pt has to stay three midnights on admission to be referred to SNF. Sw explained to pt he cannot be referred to SNF today because he possibly will not be admitted. Pt became angry and Sw explained its Medicare rules not [facility] rules. Sw offered pt a cab voucher. Pt reports he will call his ride himself when staff "is ready to kick him out".     Addendum  Pt does not have to be admitted three midnights to be considered for SNF with his type of insurance.   "

## 2018-10-01 NOTE — PLAN OF CARE
Sw obtained a cab voucher from house supervisor due to pt refusing to call a ride. Sw informed pt he can contact his PCP. Pt reports he have [facility] and [facility] in the home. Sw reports there is no other assistance she can provide. Sw asked could he afford a sitter to assist. Pt reports he cannot. However, pt reports his mother and girlfriend reside in the home with him. He reports his girlfriend dropped him off to the hospital. Sw reports she will provide him a cab voucher and explained he can follow up with his PCP. No further needs at this time.

## 2018-10-01 NOTE — ED PROVIDER NOTES
"SCRIBE #1 NOTE: I, Corinne Mack, am scribing for, and in the presence of, Kavita Nunez MD. I have scribed the entire note.      History      Chief Complaint   Patient presents with    Chest Pain     Pt states, "I had my foot amputated last week and I also hurt my ribs at the same time, anyway when I was discharged they wanted to send me to an assisted living place and I refused but that was a mistake so my home health people told me to come to the Er to be readmitted so I can be sent to the assisted living place."       Review of patient's allergies indicates:  No Known Allergies     HPI   HPI    10/1/2018, 4:15 PM   History obtained from the patient      History of Present Illness: Lavelle Ladd is a 63 y.o. male patient with PMHx of CAD, ESRD, HTN, PVD, and DM who presents to the Emergency Department for assisted living placement. Pt was admitted at this facility on 09/18/18 for multiple problems, including a gangrenous foot. Pt had his L foot amputated on 09/21/18 and was advised to go into assisted living for his recovery which the pt declined. Once the pt was home he "realized I needed more help" and stated that his refusal of assisted living placement was "a mistake". Pt was informed by home health that the quickest way to get into assisted living is to be redmitted to the hospital and d/c into the care of an assisted living facility. Pt c/o pain to the amputation site which onset several days ago after the pt was d/c from the hospital. Pt also c/o R rib pain secondary to an old rib Fx. Symptoms are constant and moderate in severity. Palpation worsens the pt's pain. No mitigating factors reported. No associated sxs reported. Pt's wound is healing well. Patient denies any fever, chills, fatigue, CP, SOB, N/V, back pain, neck pain, HA, dizziness, and all other sxs at this time. No prior Tx reported outside of the pt's daily medications (prescribed at d/c after his surgery). No further complaints or " concerns at this time.         Arrival mode: Personal vehicle    PCP: Rishabh Esteban MD       Past Medical History:  Past Medical History:   Diagnosis Date    DINORAH (acute kidney injury) 2016    Arthritis     Chronic obstructive pulmonary disease 2016    Coronary artery disease involving native coronary artery of native heart without angina pectoris 2016     donor kidney transplant for DM 16     Induction with Thymo x3 and IV solumedrol to total 875mg  Kidney Biopsy  2016: 16 glomeruli, ACR type 1 AVR type 2, significant microcirculatory changes, c4d negative, No DSA, 5 to10% fibrosis. Treated with thymo x8 2016- no rejection      Diastolic heart failure     Encounter for blood transfusion     ESRD on RRT since 10/2013 10/29/2013    Biopsy proven diabetic nephropathy and lymphoplasmacytic interstitial infiltrate not c/w with AIN (ddx sjogrens or assoc with tamm-horsefall protein extravasation)     GERD (gastroesophageal reflux disease)     History of hepatitis C, s/p successful Rx w/ SVR12 - 2017    Completed 12 weeks harvoni w/ SVR    Hyperlipidemia     Hypertension     PIC line (peripherally inserted central catheter) flush     Prophylactic immunotherapy     Proteinuria     PVD (peripheral vascular disease) 2017    RLE BKA CT 16 Extensive atherosclerotic disease of the aorta and mesenteric arteries.     Renal hypertension     Type 2 diabetes mellitus with diabetic neuropathy, with long-term current use of insulin 2016    Vitamin B12 deficiency        Past Surgical History:  Past Surgical History:   Procedure Laterality Date    AMPUTATION, FOOT, TRANSMETATARSAL Left 2018    Performed by Karla Wheeler DPM at [facility] OR    AORTOGRAPHY WITH SERIALOGRAPHY N/A 2018    Procedure: LEFT LEG ANGIOGRAM;  Surgeon: Donal Mcdonald MD;  Location: [facility] CATH LAB;  Service: Vascular;  Laterality: N/A;    av bovine graft      Left UE     AV FISTULA PLACEMENT      left UE    BIOPSY Tranplanted Kidney N/A 8/15/2016    Performed by Paulette Hanna [facility] at [facility] OR 2ND FLR    BIOPSY, LIVER, TRANSJUGULAR APPROACH N/A 2014    Performed by [facility] Diagnostic Provider at [facility] CATH LAB    CARDIAC CATHETERIZATION  2015    CLOSURE OF WOUND Left 2018    Procedure: CLOSURE, WOUND;  Surgeon: Karla Wheeler DPM;  Location: [facility] OR;  Service: Podiatry;  Laterality: Left;  Secondary Wound closure, extensive    CLOSURE, WOUND Left 2018    Performed by Karla Wheeler DPM at [facility] OR    FOOT AMPUTATION THROUGH METATARSAL Left 2018    Procedure: AMPUTATION, FOOT, TRANSMETATARSAL;  Surgeon: Karla Wheeler DPM;  Location: [facility] OR;  Service: Podiatry;  Laterality: Left;    INSERTION, CATHETER, VASCULAR N/A 10/31/2013    Performed by Ralph Mckeon MD at [facility] CATH LAB    IRRIGATION AND DEBRIDEMENT Left 2018    Performed by Katerina Magaña DPM at [facility] OR    KIDNEY TRANSPLANT  2016    LEFT LEG ANGIOGRAM N/A 2018    Performed by Donal Mcdonald MD at [facility] CATH LAB    LEG AMPUTATION THROUGH KNEE      right LE, started as nail puncture leading to diabetic ulcer    LENGTHENING, TENDON, ACHILLES Left 2018    Performed by Karla Wheeler DPM at [facility] OR    TRANSPLANT-KIDNEY Right 2016    Performed by Ronny Bergeron MD at [facility] OR 2ND FLR         Family History:  Family History   Problem Relation Age of Onset    Cancer Father     Diabetes Father     Heart failure Father     Stroke Father     Heart failure Mother     Kidney disease Neg Hx        Social History:  Social History     Tobacco Use    Smoking status: Former Smoker     Packs/day: 1.00     Years: 40.00     Pack years: 40.00     Last attempt to quit: 2013     Years since quittin.7    Smokeless tobacco: Never Used   Substance and Sexual Activity    Alcohol use: Yes     Alcohol/week: 3.6 oz     Types: 6 Cans of beer per week      Comment: 6 beers/week    Drug use: No    Sexual activity: No       ROS   Review of Systems   Constitutional: Negative for chills, diaphoresis, fatigue and fever.   Respiratory: Negative for cough and shortness of breath.    Cardiovascular: Negative for chest pain and leg swelling.   Gastrointestinal: Negative for abdominal pain, diarrhea, nausea and vomiting.   Musculoskeletal: Negative for back pain, neck pain and neck stiffness.        (+) chest wall pain   Skin: Positive for wound (surgical; healing; LLE). Negative for rash.   Neurological: Negative for dizziness, light-headedness, numbness and headaches.   All other systems reviewed and are negative.    Physical Exam      Initial Vitals [10/01/18 1427]   BP Pulse Resp Temp SpO2   132/67 83 20 97.7 °F (36.5 °C) 95 %      MAP       --          Physical Exam  Nursing Notes and Vital Signs Reviewed.  Constitutional: Patient is in no acute distress. Well-developed and well-nourished.  Head: Atraumatic. Normocephalic.  Eyes: PERRL. EOM intact. Conjunctivae are not pale. No scleral icterus.  ENT: Mucous membranes are moist. Oropharynx is clear and symmetric.    Neck: Supple. Full ROM. No lymphadenopathy.  Cardiovascular: Regular rate. Regular rhythm. No murmurs, rubs, or gallops. Distal pulses are 2+ and symmetric.  Pulmonary/Chest: No respiratory distress. Clear to auscultation bilaterally. No wheezing or rales. R lateral chest wall TTP with no redness or obvious deformities.  Abdominal: Soft and non-distended.  There is no tenderness.  No rebound, guarding, or rigidity.  Musculoskeletal: Moves all extremities. No obvious deformities. L transmetatarsal amputation with no drainage, erythema, warmth, or drainage.  Skin: Warm and dry.  Neurological:  Alert, awake, and appropriate.  Normal speech.  No acute focal neurological deficits are appreciated.  Psychiatric: Normal affect. Good eye contact. Appropriate in content.    ED Course    Procedures  ED Vital  "Signs:  Vitals:    10/01/18 1427   BP: 132/67   Pulse: 83   Resp: 20   Temp: 97.7 °F (36.5 °C)   TempSrc: Oral   SpO2: 95%   Weight: 99.8 kg (220 lb)   Height: 5' 11" (1.803 m)       Abnormal Lab Results:  Labs Reviewed - No data to display     All Lab Results:  None    Imaging Results:  Imaging Results    None          The EKG was ordered, reviewed, and independently interpreted by the ED provider.  Interpretation time: 1445  Rate: 84 BPM  Rhythm: normal sinus rhythm  Interpretation: Possible L atrial enlargement. No STEMI.             The Emergency Provider reviewed the vital signs and test results, which are outlined above.    ED Discussion     4:47 PM:  at pt bedside.    5:01 PM:  states that the pt refused to go to SN when he was d/c from this facility following his admission and surgery. Pt then decided that it was a mistake to refuse to go to SN and presented to the ED to be placed in SN.  explained to the pt that he cannot go to SN from the ED and that it requires a 3 day admission process. Pt requests admission. It has been explained to the pt that he does not meet in-patient criteria and that admitting to the hospital for this reason would be insurance fraud. Pt has been medically cleared and deemed stable for discharge home. Pt given resources for nursing homes and assisted living facilities. Pt states he only wants SNIF and does not need any of the resources offered. No further questions or complaints at this time.    5:14 PM: Reassessed pt at this time. Pt is awake, alert, and in no distress. Discussed with pt all pertinent ED information and results. Discussed pt dx and plan of tx. Gave pt all f/u and return to the ED instructions. All questions and concerns were addressed at this time. Pt expresses understanding of information and instructions, and is comfortable with plan to discharge. Pt is stable for discharge.    I discussed with patient and/or " "family/caretaker that evaluation in the ED does not suggest any emergent or life threatening medical conditions requiring immediate intervention beyond what was provided in the ED, and I believe patient is safe for discharge.  Regardless, an unremarkable evaluation in the ED does not preclude the development or presence of a serious of life threatening condition. As such, patient was instructed to return immediately for any worsening or change in current symptoms.    6:13 PM: Pt is refusing to leave the ED, citing he needs to be admitted so that he can go to [facility]. Pt states "I need help and y'all need to treat me". Pt is becoming verbally aggressive and abusive towards staff. Security has been called to the pt bedside.    6:22 PM: Pt has been escorted out of the ED by security. Pt remains verbally abusive towards staff.    ED Medication(s):  Medications   HYDROcodone-acetaminophen 5-325 mg per tablet 2 tablet (2 tablets Oral Given 10/1/18 1640)          Medication List      ASK your doctor about these medications    amLODIPine 2.5 MG tablet  Commonly known as:  NORVASC  Take 1 tablet (2.5 mg total) by mouth once daily.     aspirin 81 MG EC tablet  Commonly known as:  ECOTRIN  Take 1 tablet (81 mg total) by mouth once daily.     BD INSULIN SYRINGE ULTRA-FINE 1 mL 31 gauge x 5/16 Syrg  Generic drug:  insulin syringe-needle U-100  USE ONE AS DIRECTED FOUR TIMES DAILY WITH MEALS AND AT BEDTIME     blood sugar diagnostic Strp  1 each by Misc.(Non-Drug; Combo Route) route 3 (three) times daily.     blood-glucose meter kit  Use as instructed     budesonide-formoterol 160-4.5 mcg 160-4.5 mcg/actuation Hfaa  Commonly known as:  SYMBICORT  Inhale 2 puffs into the lungs every 12 (twelve) hours. Wash out mouth after using     ceFAZolin 2 g/50mL Dextrose IVPB 2 gram/50 mL Pgbk  Commonly known as:  ANCEF  Inject 50 mLs (2,000 mg total) into the vein every 8 (eight) hours. Patient to receive Home IV infusion from [facility]. X q 8 " "hrs x 14 days for 14 days     ergocalciferol 50,000 unit Cap  Commonly known as:  ERGOCALCIFEROL  Take 1 capsule (50,000 Units total) by mouth every 7 days. Take on Mondays     gabapentin 300 MG capsule  Commonly known as:  NEURONTIN     HYDROcodone-acetaminophen  mg per tablet  Commonly known as:  NORCO  Take 1 tablet by mouth every 4 (four) hours as needed.     inhalation spacing device  Commonly known as:  BREATHERITE VALVED MDI CHAMBER  Use as directed for inhalation.     insulin  unit/mL injection  Commonly known as:  NovoLIN N NPH U-100 Insulin  Take 25 units subq with breakfast and 15 units at bedtime     lancets Misc  1 each by Misc.(Non-Drug; Combo Route) route 3 (three) times daily.     metoprolol tartrate 25 MG tablet  Commonly known as:  LOPRESSOR  Take 1 tablet (25 mg total) by mouth 2 (two) times daily.     NovoLIN R Regular U-100 Insuln 100 unit/mL injection  Generic drug:  insulin regular  INJECT 6 UNITS WITH BREAKFAST AND 8 UNITS WITH DINNER, SUBCUTANEOUSLY 30 MIN BEFORE MEAL.     nozaseptin nasal   Commonly known as:  NOZIN  1 each by Each Nare route 2 (two) times daily.     ondansetron 4 MG Tbdl  Commonly known as:  ZOFRAN-ODT  Take 1 tablet (4 mg total) by mouth every 8 (eight) hours as needed.     * pen needle, diabetic 32 gauge x 5/32" Ndle  Commonly known as:  EASY COMFORT PEN NEEDLES  Inject 1 each into the skin 3 (three) times daily.     * BD ULTRA-FINE SHORT PEN NEEDLE 31 gauge x 5/16" Ndle  Generic drug:  pen needle, diabetic     predniSONE 5 MG tablet  Commonly known as:  DELTASONE  Take 1 tablet (5 mg total) by mouth once daily.     sodium bicarbonate 650 MG tablet  Take 4 tablets (2,600 mg total) by mouth 2 (two) times daily.     tacrolimus 0.5 MG Cap  Commonly known as:  PROGRAF  Take 3 capsules (1.5 mg total) by mouth every 12 (twelve) hours. Z94.0 Kidney Transplant         * This list has 2 medication(s) that are the same as other medications prescribed for you. " Read the directions carefully, and ask your doctor or other care provider to review them with you.                Follow-up Information     Rishabh Esteban MD. Schedule an appointment as soon as possible for a visit in 1 week.    Specialty:  Family Medicine  Contact information:  [address] [facility]  [address]  [place] [address]  [phone]                     Medical Decision Making    Medical Decision Making:   Clinical Tests:   Medical Tests: Ordered and Reviewed            Scribe Attestation:   Scribe #1: I performed the above scribed service and the documentation accurately describes the services I performed. I attest to the accuracy of the note.    Attending:   Physician Attestation Statement for Scribe #1: I, Kavita Nunez MD, personally performed the services described in this documentation, as scribed by Corinne Mack, in my presence, and it is both accurate and complete.          Clinical Impression       ICD-10-CM ICD-9-CM   1. Rib pain R07.81 786.50   2. Chest pain R07.9 786.50       Disposition:   Disposition: Discharged  Condition: Stable           Kavita Nunez MD  10/05/18 1150

## 2018-10-03 ENCOUNTER — TELEPHONE (OUTPATIENT)
Dept: PODIATRY | Facility: CLINIC | Age: 63
End: 2018-10-03

## 2018-10-03 ENCOUNTER — OFFICE VISIT (OUTPATIENT)
Dept: PODIATRY | Facility: CLINIC | Age: 63
End: 2018-10-03
Payer: MEDICARE

## 2018-10-03 VITALS — TEMPERATURE: 99 F | BODY MASS INDEX: 30.8 KG/M2 | WEIGHT: 220 LBS | HEIGHT: 71 IN

## 2018-10-03 DIAGNOSIS — E11.42 DIABETIC POLYNEUROPATHY ASSOCIATED WITH TYPE 2 DIABETES MELLITUS: ICD-10-CM

## 2018-10-03 DIAGNOSIS — I73.9 PVD (PERIPHERAL VASCULAR DISEASE): ICD-10-CM

## 2018-10-03 DIAGNOSIS — M86.172 ACUTE OSTEOMYELITIS OF TOE OF LEFT FOOT: ICD-10-CM

## 2018-10-03 DIAGNOSIS — Z94.0 KIDNEY TRANSPLANT RECIPIENT: ICD-10-CM

## 2018-10-03 DIAGNOSIS — Z89.432 HISTORY OF TRANSMETATARSAL AMPUTATION OF LEFT FOOT: Primary | ICD-10-CM

## 2018-10-03 PROCEDURE — 99214 OFFICE O/P EST MOD 30 MIN: CPT | Mod: PBBFAC,PO | Performed by: PODIATRIST

## 2018-10-03 PROCEDURE — 99999 PR PBB SHADOW E&M-EST. PATIENT-LVL IV: CPT | Mod: PBBFAC,,, | Performed by: PODIATRIST

## 2018-10-03 PROCEDURE — 99024 POSTOP FOLLOW-UP VISIT: CPT | Mod: ,,, | Performed by: PODIATRIST

## 2018-10-03 NOTE — TELEPHONE ENCOUNTER
----- Message from Becca Muñoz sent at 10/3/2018 12:03 PM CDT -----  Contact: self  Pt states he was seen today by Dr Wheeler. Pt is calling in regards to rx (pain meds) that was to be sent to pharmacy. Pharmacy does not have pt's rx and pt was calling for status. Please call back at [phone].      Pt uses:    [id number] - [place] - [address] [facility]  [address] [facility]  [place] [address]  Phone: [phone] Fax: [phone]      Thanks,   Becca Muñoz

## 2018-10-03 NOTE — PROGRESS NOTES
"[facility]  PODIATRIC MEDICINE AND SURGERY  PROGRESS NOTE  Chief Complaint   Patient presents with    Post-op Evaluation     DOS: 9/21/18 Ampution of foot      PCP: Dr. Esteban     SUBJECTIVE   Lavelle Ladd is a 63 y.o. male status post left transmetatarsal amputation secondary to gangrene of multiple digits and JOSE R. Pt underwent  delayed primary wound closure, DOS 9/24/18. Pt is appx 1 week(s) post operatively.  Patient admits the pain to the site.  He has been taking Tylenol to help as needed for pain.  Initially patient was recommended it is discharged to be admitted to skilled nursing facility but he declined.  He states on October 1st she reports [facility] due to shortness of breath and requested to be admitted to skilled nursing facility as he states he is unable to perform daily activities and remain compliant with postoperative care as he initially thought however he was declined admission.  Currently patient has  home health and home IV infusion. Infectious Disease recommended IV Cefazolin every 8 hours for 14 days for his MSSA gangrenous infection.  Patient denies any nausea vomiting fever chills or shortness of breath.  He relates he does try to stay off of his left lower extremity.  He has no further pedal complaints.      OBJECTIVE   Vitals:    10/03/18 1047   Temp: 98.5 °F (36.9 °C)   TempSrc: Oral   Weight: 99.8 kg (220 lb 0.3 oz)   Height: 5' 11" (1.803 m)       Neurovascular status - vascular status grossly intact.   Surgical incision well coapted with sutures, there is area of macerations at the central aspect of incision with mild serosanguineous drainage expressed from the site.  Skin texture warm from proximally distally, there and there are no evident size of necrosis or gangrenous changes; there is edema and erythema plantar flap; there is no ascending or dorsal foot erythema    Posterior achilles tendon sutures sites, clean, dry, intact                FINAL PATHOLOGIC " DIAGNOSIS  1. Left foot 1st metatarsal:  Benign bone.  Negative for osteomyelitis.  2. Left foot 2nd metatarsal:  Benign bone.  Negative for osteomyelitis.  3. Left forefoot:  Gangrenous necrosis.  Negative for osteomyelitis.  Inflammation extends to the skin and soft tissue resection margin.      Radiographs reviewed:   -  post op.    ASSESSMENT  1. Surgery follow-up examination    PLAN  - Weight bearing status -  Strict NWB to site   - Ambulatory aids -wheel chair   - Dressing - Dressing change was performed.  Macerated area was painted with gentian violet dye; well-padded sterile dressing and Valdez compression splint was applied to left lower extremity.  Reinforce compliance with strict nonweightbearing.  Pt instructed to keep clean, dry, intact. Do not get wet.     I discussed with patient as I initially informed him that I do recommend LTAC or SNF admission  due to postoperative demands, IV antibiosis, and high risk of further limb loss and/or further infection.  I will contact the hospital and try to get patient admitted as well as contact his primary care physician.  I did inform patient that he does have Norco prescribed and has been sent to his pharmacy for pickup.  All questions were answered to patient's satisfaction.      If Patient is not able to be admitted to skilled nursing facility he is to follow up as scheduled with my colleague, Dr. Guthrie, next week at our [facility] for post operative dressing change and re-application of splint.  I discussed with patient will closely monitor if signs of further maceration, drainage, and/or any signs of early ischemia. He will then follow back up with me the following week for possible suture removal.     Disclaimer:  This note may have been prepared using voice recognition software, it may have not been extensively proofed, as such there could be errors within the text such as sound alike errors.       Future Appointments   Date Time Provider Department  [place]   10/11/2018 11:30 AM Tex Guthrie DPM [facility]   10/18/2018 11:00 AM Karla Wheeler DPM [facility] [place]       Report Electronically Signed By:     Karla Wheeler DPM   Podiatric Medicine & Surgery  [facility]  10/3/2018

## 2018-10-03 NOTE — TELEPHONE ENCOUNTER
Contact pt and informed him of the information I was given about a possible SNF and was told he wanted to do it on his own and that he will stay where he is currently. I tried to inform him that it would be beneficial but he told me thanks anyway and the call ended.

## 2018-10-03 NOTE — TELEPHONE ENCOUNTER
----- Message from Karla Wheeler DPM sent at 10/3/2018  1:45 PM CDT -----  Inform patient I spoke with . They can try to get him admitted to SKILLED nursing facility. He will need to go to ER before 4 PM. Otherwise he can try tomorrow anytime before 4 PM and he must tell the ER that he needs to speak with case management to get admitted to SNF, he can let them know that he was sent here by podiatrist who was informed that case management at hospital will need to admit him to SNF since they did not do it when he came to ER on 10/1. The key is he has to be there b4 4 PM.

## 2018-10-11 ENCOUNTER — OFFICE VISIT (OUTPATIENT)
Dept: PODIATRY | Facility: CLINIC | Age: 63
End: 2018-10-11
Payer: MEDICARE

## 2018-10-11 DIAGNOSIS — Z89.432 HISTORY OF TRANSMETATARSAL AMPUTATION OF LEFT FOOT: Primary | ICD-10-CM

## 2018-10-11 DIAGNOSIS — M86.172 ACUTE OSTEOMYELITIS OF TOE OF LEFT FOOT: ICD-10-CM

## 2018-10-11 DIAGNOSIS — E11.51 TYPE 2 DIABETES MELLITUS WITH DIABETIC PERIPHERAL ANGIOPATHY WITHOUT GANGRENE, WITH LONG-TERM CURRENT USE OF INSULIN: ICD-10-CM

## 2018-10-11 DIAGNOSIS — Z79.4 TYPE 2 DIABETES MELLITUS WITH DIABETIC PERIPHERAL ANGIOPATHY WITHOUT GANGRENE, WITH LONG-TERM CURRENT USE OF INSULIN: ICD-10-CM

## 2018-10-11 DIAGNOSIS — E11.42 DIABETIC POLYNEUROPATHY ASSOCIATED WITH TYPE 2 DIABETES MELLITUS: ICD-10-CM

## 2018-10-11 PROCEDURE — 99999 PR PBB SHADOW E&M-EST. PATIENT-LVL II: CPT | Mod: PBBFAC,,, | Performed by: PODIATRIST

## 2018-10-11 PROCEDURE — 99212 OFFICE O/P EST SF 10 MIN: CPT | Mod: PBBFAC | Performed by: PODIATRIST

## 2018-10-11 PROCEDURE — 99024 POSTOP FOLLOW-UP VISIT: CPT | Mod: ,,, | Performed by: PODIATRIST

## 2018-10-18 ENCOUNTER — OFFICE VISIT (OUTPATIENT)
Dept: PODIATRY | Facility: CLINIC | Age: 63
End: 2018-10-18
Payer: MEDICARE

## 2018-10-18 VITALS
BODY MASS INDEX: 30.8 KG/M2 | HEART RATE: 77 BPM | WEIGHT: 220 LBS | TEMPERATURE: 95 F | DIASTOLIC BLOOD PRESSURE: 90 MMHG | HEIGHT: 71 IN | SYSTOLIC BLOOD PRESSURE: 140 MMHG

## 2018-10-18 DIAGNOSIS — Z89.432 HISTORY OF TRANSMETATARSAL AMPUTATION OF LEFT FOOT: ICD-10-CM

## 2018-10-18 DIAGNOSIS — T81.31XA POSTOPERATIVE WOUND DEHISCENCE, INITIAL ENCOUNTER: Primary | ICD-10-CM

## 2018-10-18 DIAGNOSIS — E11.42 DIABETIC POLYNEUROPATHY ASSOCIATED WITH TYPE 2 DIABETES MELLITUS: ICD-10-CM

## 2018-10-18 DIAGNOSIS — Z94.0 KIDNEY TRANSPLANT RECIPIENT: ICD-10-CM

## 2018-10-18 DIAGNOSIS — M86.172 ACUTE OSTEOMYELITIS OF TOE OF LEFT FOOT: ICD-10-CM

## 2018-10-18 DIAGNOSIS — I73.9 PVD (PERIPHERAL VASCULAR DISEASE): ICD-10-CM

## 2018-10-18 PROCEDURE — 87075 CULTR BACTERIA EXCEPT BLOOD: CPT

## 2018-10-18 PROCEDURE — 3045F PR MOST RECENT HEMOGLOBIN A1C LEVEL 7.0-9.0%: CPT | Mod: CPTII,,, | Performed by: PODIATRIST

## 2018-10-18 PROCEDURE — 99999 PR PBB SHADOW E&M-EST. PATIENT-LVL IV: CPT | Mod: PBBFAC,,, | Performed by: PODIATRIST

## 2018-10-18 PROCEDURE — 13160 SEC CLSR SURG WND/DEHSN XTN: CPT | Mod: S$PBB,58,, | Performed by: PODIATRIST

## 2018-10-18 PROCEDURE — 99214 OFFICE O/P EST MOD 30 MIN: CPT | Mod: PBBFAC,PO | Performed by: PODIATRIST

## 2018-10-18 PROCEDURE — 3080F DIAST BP >= 90 MM HG: CPT | Mod: CPTII,,, | Performed by: PODIATRIST

## 2018-10-18 PROCEDURE — 3008F BODY MASS INDEX DOCD: CPT | Mod: CPTII,,, | Performed by: PODIATRIST

## 2018-10-18 PROCEDURE — 99024 POSTOP FOLLOW-UP VISIT: CPT | Mod: S$PBB,,, | Performed by: PODIATRIST

## 2018-10-18 PROCEDURE — 13160 SEC CLSR SURG WND/DEHSN XTN: CPT | Mod: PBBFAC,PO | Performed by: PODIATRIST

## 2018-10-18 PROCEDURE — 3077F SYST BP >= 140 MM HG: CPT | Mod: CPTII,,, | Performed by: PODIATRIST

## 2018-10-18 PROCEDURE — 1101F PT FALLS ASSESS-DOCD LE1/YR: CPT | Mod: CPTII,,, | Performed by: PODIATRIST

## 2018-10-18 PROCEDURE — 87070 CULTURE OTHR SPECIMN AEROBIC: CPT

## 2018-10-18 RX ORDER — HYDROCODONE BITARTRATE AND ACETAMINOPHEN 10; 325 MG/1; MG/1
1 TABLET ORAL EVERY 4 HOURS PRN
Qty: 30 TABLET | Refills: 0 | Status: ON HOLD | OUTPATIENT
Start: 2018-10-18 | End: 2018-11-13 | Stop reason: SDUPTHER

## 2018-10-18 RX ORDER — AMOXICILLIN AND CLAVULANATE POTASSIUM 875; 125 MG/1; MG/1
1 TABLET, FILM COATED ORAL EVERY 12 HOURS
Qty: 20 TABLET | Refills: 0 | Status: SHIPPED | OUTPATIENT
Start: 2018-10-18 | End: 2018-10-28

## 2018-10-18 NOTE — PROGRESS NOTES
"[facility]   PODIATRIC MEDICINE AND SURGERY  PROGRESS NOTE    CC:  Pt is s/p LEFT TMA, Left JOSE R, DPC 9/27/18    PCP: Dr. Esteban     SUBJECTIVE   Lavelle Ladd is a 63 y.o. male status post left transmetatarsal amputation secondary to gangrene of multiple digits and JOSE R. Pt underwent  delayed primary wound closure, DOS 9/24/18. Pt is appx 1 week(s) post operatively.  Patient admits the pain to the site.  He has been taking Tylenol to help as needed for pain.  Initially patient was recommended it is discharged to be admitted to skilled nursing facility but he declined.  He states on October 1st she reports [facility] due to shortness of breath and requested to be admitted to skilled nursing facility as he states he is unable to perform daily activities and remain compliant with postoperative care as he initially thought however he was declined admission.  Currently patient has  home health and home IV infusion. Infectious Disease recommended IV Cefazolin every 8 hours for 14 days for his MSSA gangrenous infection.  Patient denies any nausea vomiting fever chills or shortness of breath.  He relates he does try to stay off of his left lower extremity.  He has no further pedal complaints.    10/18/18  Pt notes that he also has rib fractures. Has been changing dressing daily as instructed by my colleague. Notes pain still present to feet. Requests pain medication. He has not seen his PCP. No further pedal complaints. Denies N/V/F/C.    OBJECTIVE   Vitals:    10/18/18 1143   BP: (!) 140/90   Pulse: 77   Temp: (!) 94.5 °F (34.7 °C)   Weight: 99.8 kg (220 lb 0.3 oz)   Height: 5' 11" (1.803 m)       Neurovascular status - vascular status grossly intact.   Surgical incision well coapted with sutures at medial and lateral aspect of amputation stump, there is wound dehiscence throughout entire central aspect of TMA stump with serosanguineous drainage expressed from site.  Skin texture warm from proximally " distally, there and there are no evident size of necrosis or gangrenous changes; there is edema and erythema plantar flap; there is no ascending or dorsal foot erythema  Posterior achilles tendon sutures sites, clean, dry, intact    FINAL PATHOLOGIC DIAGNOSIS  1. Left foot 1st metatarsal:  Benign bone.  Negative for osteomyelitis.  2. Left foot 2nd metatarsal:  Benign bone.  Negative for osteomyelitis.  3. Left forefoot:  Gangrenous necrosis.  Negative for osteomyelitis.  Inflammation extends to the skin and soft tissue resection margin.      Radiographs reviewed:   -  post op.    ASSESSMENT  Postoperative wound dehiscence, initial encounter  -     Aerobic culture  -     Culture, Anaerobic    History of transmetatarsal amputation of left foot  -     Aerobic culture  -     Culture, Anaerobic    Acute osteomyelitis of toe of left foot  -     Aerobic culture  -     Culture, Anaerobic    Diabetic polyneuropathy associated with type 2 diabetes mellitus    PVD (peripheral vascular disease)    Kidney transplant recipient    Other orders  -     amoxicillin-clavulanate 875-125mg (AUGMENTIN) 875-125 mg per tablet; Take 1 tablet by mouth every 12 (twelve) hours. for 10 days  Dispense: 20 tablet; Refill: 0  -     HYDROcodone-acetaminophen (NORCO)  mg per tablet; Take 1 tablet by mouth every 4 (four) hours as needed.  Dispense: 30 tablet; Refill: 0          PLAN  At this point, I discussed with patient will need to irrigate wound, take deep cultures, and perform wound closure. If closure fails, will need operative debridement and flap remodeling. Written consent obtained and patient wishes to proceed with attempt at secondary wound closure today.     Date: 10/18/18  Patient: Lavelle Ladd   Medical Record Number: [id number]   Surgeon: Karla Wheeler DPM  Assistant: Latrice Centeno MA   Preoperative Diagnosis: Left foot wound dehiscence   Postoperative Diagnosis: left foot wound dehiscence   Surgical Procedure(s): Wound  closure, left foot, extensive   Pathology: Deep wound cultures   Anesthesia: 5ccs of 1% lidocaine plain   Hemostasis: none   Estimated blood loss: <5 ccs   Materials: 3-0 Nylon sutures, staples   Injectables: none   Complications: None  Condition: Stable    After written and informed consent obtained, a  local block was infiltrated into the left foot utilizing 5cc of  1% lidocaine plain. The left lower extremity was scrubbed, prepped and draped in the usual sterile manner. Attention was directed to the open wound on the left TMA stump. The sutures were removed from the amputation stump to fully access the wound dehiscence. There was a mild amount of serous drainage expressed from the amputation stump. Deep wound cultures were takne and sent for aerobic and anaerobic. The wound was inspected and any non viable tissue was debrided. The foot was milked and no  purulent drainage was expressed.  The wound was copiously irrigated with normal saline. Deep cultures were taken and sent for aerobic and anaerobic. The amputation skin flaps were then lightly debrided with #15 blade to freshen the wound edges. The skin flaps were then reapproximated with 3-0 Nylon and staples across the amputation stump. The wound was dressed with betadine ointment, adaptic and dry sterile dressing. A posterior splint was then re-applied. Pt was prescribed antibiotics and will f/u via telephone pending wound culture results. Discussed if failure of skin coaption in the setting of multiple co-morbidities specifically DM2 complicated with peripheral angiopathy , will need operative intervention.     The total visit time face to face with the patient was over 30 minutes. Time spent in counseling, discussion and coordination of care with the patient was over 15 minutes. Topics discussed as noted above.      Disclaimer:  This note may have been prepared using voice recognition software, it may have not been extensively proofed, as such there could be  errors within the text such as sound alike errors.       Future Appointments   Date Time Provider Department Center   10/25/2018  4:40 PM Karla Wheeler DPM [facility] POD [place]       Report Electronically Signed By:     Karla Wheeler DPM   Podiatric Medicine & Surgery  [facility]  10/22/2018

## 2018-10-22 LAB — BACTERIA SPEC AEROBE CULT: NORMAL

## 2018-10-23 LAB — BACTERIA SPEC ANAEROBE CULT: NORMAL

## 2018-10-31 ENCOUNTER — ANESTHESIA EVENT (OUTPATIENT)
Dept: SURGERY | Facility: HOSPITAL | Age: 63
DRG: 475 | End: 2018-10-31
Payer: MEDICARE

## 2018-10-31 ENCOUNTER — HOSPITAL ENCOUNTER (INPATIENT)
Facility: HOSPITAL | Age: 63
LOS: 13 days | Discharge: NURSING, REHAB OR LONG-TERM CARE | DRG: 475 | End: 2018-11-13
Attending: PODIATRIST | Admitting: INTERNAL MEDICINE
Payer: MEDICARE

## 2018-10-31 ENCOUNTER — ANESTHESIA (OUTPATIENT)
Dept: SURGERY | Facility: HOSPITAL | Age: 63
DRG: 475 | End: 2018-10-31
Payer: MEDICARE

## 2018-10-31 ENCOUNTER — OFFICE VISIT (OUTPATIENT)
Dept: PODIATRY | Facility: CLINIC | Age: 63
DRG: 475 | End: 2018-10-31
Payer: MEDICARE

## 2018-10-31 VITALS
WEIGHT: 220 LBS | HEART RATE: 64 BPM | TEMPERATURE: 97 F | SYSTOLIC BLOOD PRESSURE: 161 MMHG | DIASTOLIC BLOOD PRESSURE: 90 MMHG | HEIGHT: 71 IN | BODY MASS INDEX: 30.8 KG/M2

## 2018-10-31 DIAGNOSIS — L02.612 ABSCESS OF LEFT FOOT: ICD-10-CM

## 2018-10-31 DIAGNOSIS — N18.30 STAGE 3 CHRONIC KIDNEY DISEASE: ICD-10-CM

## 2018-10-31 DIAGNOSIS — N17.9 AKI (ACUTE KIDNEY INJURY): ICD-10-CM

## 2018-10-31 DIAGNOSIS — I10 ESSENTIAL HYPERTENSION: ICD-10-CM

## 2018-10-31 DIAGNOSIS — Z89.432 S/P TRANSMETATARSAL AMPUTATION OF FOOT, LEFT: Primary | ICD-10-CM

## 2018-10-31 DIAGNOSIS — I96 GANGRENE OF LEFT FOOT: ICD-10-CM

## 2018-10-31 DIAGNOSIS — L03.116 CELLULITIS OF LEFT FOOT: ICD-10-CM

## 2018-10-31 DIAGNOSIS — I73.9 PVD (PERIPHERAL VASCULAR DISEASE): ICD-10-CM

## 2018-10-31 DIAGNOSIS — I73.9 PERIPHERAL VASCULAR DISEASE: ICD-10-CM

## 2018-10-31 DIAGNOSIS — Z94.0 KIDNEY TRANSPLANT RECIPIENT: ICD-10-CM

## 2018-10-31 DIAGNOSIS — D84.9 IMMUNOSUPPRESSION: ICD-10-CM

## 2018-10-31 DIAGNOSIS — E11.628 DIABETIC FOOT INFECTION: ICD-10-CM

## 2018-10-31 DIAGNOSIS — B95.61 MSSA BACTEREMIA: ICD-10-CM

## 2018-10-31 DIAGNOSIS — Z89.511 HX OF RIGHT BKA: ICD-10-CM

## 2018-10-31 DIAGNOSIS — R00.1 BRADYCARDIA: ICD-10-CM

## 2018-10-31 DIAGNOSIS — Z71.89 ENCOUNTER FOR MEDICATION REVIEW AND COUNSELING: ICD-10-CM

## 2018-10-31 DIAGNOSIS — L08.9 DIABETIC FOOT INFECTION: ICD-10-CM

## 2018-10-31 DIAGNOSIS — R78.81 MSSA BACTEREMIA: ICD-10-CM

## 2018-10-31 PROBLEM — Z91.199 NON COMPLIANCE WITH MEDICAL TREATMENT: Status: ACTIVE | Noted: 2018-10-31

## 2018-10-31 LAB
ALBUMIN SERPL BCP-MCNC: 3.3 G/DL
ALP SERPL-CCNC: 86 U/L
ALT SERPL W/O P-5'-P-CCNC: 12 U/L
ANION GAP SERPL CALC-SCNC: 12 MMOL/L
AST SERPL-CCNC: 20 U/L
BASOPHILS # BLD AUTO: 0.02 K/UL
BASOPHILS NFR BLD: 0.3 %
BILIRUB SERPL-MCNC: 0.6 MG/DL
BUN SERPL-MCNC: 20 MG/DL
CALCIUM SERPL-MCNC: 9.5 MG/DL
CHLORIDE SERPL-SCNC: 101 MMOL/L
CO2 SERPL-SCNC: 23 MMOL/L
CREAT SERPL-MCNC: 1.5 MG/DL
CRP SERPL-MCNC: 1.2 MG/L
DIFFERENTIAL METHOD: ABNORMAL
EOSINOPHIL # BLD AUTO: 0 K/UL
EOSINOPHIL NFR BLD: 0.3 %
ERYTHROCYTE [DISTWIDTH] IN BLOOD BY AUTOMATED COUNT: 16 %
ERYTHROCYTE [SEDIMENTATION RATE] IN BLOOD BY WESTERGREN METHOD: 14 MM/HR
EST. GFR  (AFRICAN AMERICAN): 56 ML/MIN/1.73 M^2
EST. GFR  (NON AFRICAN AMERICAN): 48 ML/MIN/1.73 M^2
GLUCOSE SERPL-MCNC: 117 MG/DL
HCT VFR BLD AUTO: 43.2 %
HGB BLD-MCNC: 13.9 G/DL
INR PPP: 1
LACTATE SERPL-SCNC: 2 MMOL/L
LYMPHOCYTES # BLD AUTO: 2.4 K/UL
LYMPHOCYTES NFR BLD: 32.6 %
MAGNESIUM SERPL-MCNC: 1.9 MG/DL
MCH RBC QN AUTO: 29.2 PG
MCHC RBC AUTO-ENTMCNC: 32.2 G/DL
MCV RBC AUTO: 91 FL
MONOCYTES # BLD AUTO: 0.6 K/UL
MONOCYTES NFR BLD: 8.3 %
NEUTROPHILS # BLD AUTO: 4.3 K/UL
NEUTROPHILS NFR BLD: 58.5 %
PHOSPHATE SERPL-MCNC: 2.9 MG/DL
PLATELET # BLD AUTO: 193 K/UL
PMV BLD AUTO: 9.5 FL
POCT GLUCOSE: 190 MG/DL (ref 70–110)
POCT GLUCOSE: 66 MG/DL (ref 70–110)
POCT GLUCOSE: 78 MG/DL (ref 70–110)
POTASSIUM SERPL-SCNC: 4.8 MMOL/L
PROCALCITONIN SERPL IA-MCNC: 0.02 NG/ML
PROT SERPL-MCNC: 7.2 G/DL
PROTHROMBIN TIME: 9.9 SEC
RBC # BLD AUTO: 4.76 M/UL
SODIUM SERPL-SCNC: 136 MMOL/L
WBC # BLD AUTO: 7.39 K/UL

## 2018-10-31 PROCEDURE — 0QBP0ZZ EXCISION OF LEFT METATARSAL, OPEN APPROACH: ICD-10-PCS | Performed by: PODIATRIST

## 2018-10-31 PROCEDURE — 63600175 PHARM REV CODE 636 W HCPCS: Performed by: NURSE PRACTITIONER

## 2018-10-31 PROCEDURE — 93005 ELECTROCARDIOGRAM TRACING: CPT

## 2018-10-31 PROCEDURE — 63600175 PHARM REV CODE 636 W HCPCS: Performed by: ANESTHESIOLOGY

## 2018-10-31 PROCEDURE — 63600175 PHARM REV CODE 636 W HCPCS: Performed by: PODIATRIST

## 2018-10-31 PROCEDURE — 25000003 PHARM REV CODE 250: Performed by: PODIATRIST

## 2018-10-31 PROCEDURE — 25000003 PHARM REV CODE 250: Performed by: NURSE PRACTITIONER

## 2018-10-31 PROCEDURE — 36000706: Performed by: PODIATRIST

## 2018-10-31 PROCEDURE — 21400001 HC TELEMETRY ROOM

## 2018-10-31 PROCEDURE — 84100 ASSAY OF PHOSPHORUS: CPT

## 2018-10-31 PROCEDURE — 36415 COLL VENOUS BLD VENIPUNCTURE: CPT

## 2018-10-31 PROCEDURE — 36000707: Performed by: PODIATRIST

## 2018-10-31 PROCEDURE — 99024 POSTOP FOLLOW-UP VISIT: CPT | Mod: ,,, | Performed by: PODIATRIST

## 2018-10-31 PROCEDURE — 99223 1ST HOSP IP/OBS HIGH 75: CPT | Mod: ,,, | Performed by: INTERNAL MEDICINE

## 2018-10-31 PROCEDURE — 0Y6N0ZD DETACHMENT AT LEFT FOOT, PARTIAL 4TH RAY, OPEN APPROACH: ICD-10-PCS | Performed by: PODIATRIST

## 2018-10-31 PROCEDURE — 85610 PROTHROMBIN TIME: CPT

## 2018-10-31 PROCEDURE — 99999 PR PBB SHADOW E&M-EST. PATIENT-LVL V: CPT | Mod: PBBFAC,,, | Performed by: PODIATRIST

## 2018-10-31 PROCEDURE — 87186 SC STD MICRODIL/AGAR DIL: CPT | Mod: 59

## 2018-10-31 PROCEDURE — 87075 CULTR BACTERIA EXCEPT BLOOD: CPT | Mod: 59

## 2018-10-31 PROCEDURE — 37000008 HC ANESTHESIA 1ST 15 MINUTES: Performed by: PODIATRIST

## 2018-10-31 PROCEDURE — 83735 ASSAY OF MAGNESIUM: CPT

## 2018-10-31 PROCEDURE — 85025 COMPLETE CBC W/AUTO DIFF WBC: CPT

## 2018-10-31 PROCEDURE — 93010 ELECTROCARDIOGRAM REPORT: CPT | Mod: ,,, | Performed by: INTERNAL MEDICINE

## 2018-10-31 PROCEDURE — 28005 TREAT FOOT BONE LESION: CPT | Mod: 58,HCNC,LT, | Performed by: PODIATRIST

## 2018-10-31 PROCEDURE — 37000009 HC ANESTHESIA EA ADD 15 MINS: Performed by: PODIATRIST

## 2018-10-31 PROCEDURE — 27201423 OPTIME MED/SURG SUP & DEVICES STERILE SUPPLY: Performed by: PODIATRIST

## 2018-10-31 PROCEDURE — 99215 OFFICE O/P EST HI 40 MIN: CPT | Mod: PBBFAC,PO,25 | Performed by: PODIATRIST

## 2018-10-31 PROCEDURE — 11000001 HC ACUTE MED/SURG PRIVATE ROOM

## 2018-10-31 PROCEDURE — 87075 CULTR BACTERIA EXCEPT BLOOD: CPT

## 2018-10-31 PROCEDURE — 87186 SC STD MICRODIL/AGAR DIL: CPT

## 2018-10-31 PROCEDURE — 99223 1ST HOSP IP/OBS HIGH 75: CPT | Mod: 25,,, | Performed by: PODIATRIST

## 2018-10-31 PROCEDURE — 85651 RBC SED RATE NONAUTOMATED: CPT

## 2018-10-31 PROCEDURE — 63600175 PHARM REV CODE 636 W HCPCS: Performed by: NURSE ANESTHETIST, CERTIFIED REGISTERED

## 2018-10-31 PROCEDURE — 84145 PROCALCITONIN (PCT): CPT

## 2018-10-31 PROCEDURE — 87070 CULTURE OTHR SPECIMN AEROBIC: CPT | Mod: 59

## 2018-10-31 PROCEDURE — 0Y6N0Z9 DETACHMENT AT LEFT FOOT, PARTIAL 1ST RAY, OPEN APPROACH: ICD-10-PCS | Performed by: PODIATRIST

## 2018-10-31 PROCEDURE — 71000033 HC RECOVERY, INTIAL HOUR: Performed by: PODIATRIST

## 2018-10-31 PROCEDURE — S0020 INJECTION, BUPIVICAINE HYDRO: HCPCS | Performed by: PODIATRIST

## 2018-10-31 PROCEDURE — 25000003 PHARM REV CODE 250: Performed by: NURSE ANESTHETIST, CERTIFIED REGISTERED

## 2018-10-31 PROCEDURE — 87040 BLOOD CULTURE FOR BACTERIA: CPT

## 2018-10-31 PROCEDURE — 87070 CULTURE OTHR SPECIMN AEROBIC: CPT

## 2018-10-31 PROCEDURE — 80053 COMPREHEN METABOLIC PANEL: CPT

## 2018-10-31 PROCEDURE — 87077 CULTURE AEROBIC IDENTIFY: CPT | Mod: 59

## 2018-10-31 PROCEDURE — 86140 C-REACTIVE PROTEIN: CPT

## 2018-10-31 PROCEDURE — 0Y6N0ZF DETACHMENT AT LEFT FOOT, PARTIAL 5TH RAY, OPEN APPROACH: ICD-10-PCS | Performed by: PODIATRIST

## 2018-10-31 PROCEDURE — 83605 ASSAY OF LACTIC ACID: CPT

## 2018-10-31 RX ORDER — ASPIRIN 81 MG/1
81 TABLET ORAL DAILY
Status: DISCONTINUED | OUTPATIENT
Start: 2018-11-01 | End: 2018-11-05

## 2018-10-31 RX ORDER — ARFORMOTEROL TARTRATE 15 UG/2ML
15 SOLUTION RESPIRATORY (INHALATION) EVERY 12 HOURS
Status: DISCONTINUED | OUTPATIENT
Start: 2018-10-31 | End: 2018-11-13 | Stop reason: HOSPADM

## 2018-10-31 RX ORDER — CEFAZOLIN SODIUM 2 G/50ML
2 SOLUTION INTRAVENOUS
Status: DISCONTINUED | OUTPATIENT
Start: 2018-10-31 | End: 2018-10-31

## 2018-10-31 RX ORDER — SODIUM CHLORIDE 0.9 % (FLUSH) 0.9 %
5 SYRINGE (ML) INJECTION
Status: DISCONTINUED | OUTPATIENT
Start: 2018-10-31 | End: 2018-11-06 | Stop reason: SDUPTHER

## 2018-10-31 RX ORDER — INSULIN ASPART 100 [IU]/ML
0-5 INJECTION, SOLUTION INTRAVENOUS; SUBCUTANEOUS
Status: DISCONTINUED | OUTPATIENT
Start: 2018-10-31 | End: 2018-11-04

## 2018-10-31 RX ORDER — SODIUM CHLORIDE 0.9 % (FLUSH) 0.9 %
3 SYRINGE (ML) INJECTION
Status: DISCONTINUED | OUTPATIENT
Start: 2018-10-31 | End: 2018-11-06 | Stop reason: SDUPTHER

## 2018-10-31 RX ORDER — BUDESONIDE 0.5 MG/2ML
0.5 INHALANT ORAL EVERY 12 HOURS
Status: DISCONTINUED | OUTPATIENT
Start: 2018-10-31 | End: 2018-11-13 | Stop reason: HOSPADM

## 2018-10-31 RX ORDER — GLUCAGON 1 MG
1 KIT INJECTION
Status: DISCONTINUED | OUTPATIENT
Start: 2018-10-31 | End: 2018-11-13 | Stop reason: HOSPADM

## 2018-10-31 RX ORDER — AMLODIPINE BESYLATE 2.5 MG/1
2.5 TABLET ORAL DAILY
Status: DISCONTINUED | OUTPATIENT
Start: 2018-10-31 | End: 2018-11-03

## 2018-10-31 RX ORDER — TACROLIMUS 0.5 MG/1
1.5 CAPSULE ORAL 2 TIMES DAILY
Status: DISCONTINUED | OUTPATIENT
Start: 2018-10-31 | End: 2018-11-13 | Stop reason: HOSPADM

## 2018-10-31 RX ORDER — SODIUM BICARBONATE 650 MG/1
2600 TABLET ORAL 2 TIMES DAILY
Status: DISCONTINUED | OUTPATIENT
Start: 2018-10-31 | End: 2018-11-13 | Stop reason: HOSPADM

## 2018-10-31 RX ORDER — METOPROLOL TARTRATE 25 MG/1
25 TABLET, FILM COATED ORAL 2 TIMES DAILY
Status: DISCONTINUED | OUTPATIENT
Start: 2018-10-31 | End: 2018-11-09

## 2018-10-31 RX ORDER — IBUPROFEN 200 MG
24 TABLET ORAL
Status: DISCONTINUED | OUTPATIENT
Start: 2018-10-31 | End: 2018-11-04

## 2018-10-31 RX ORDER — HYDRALAZINE HYDROCHLORIDE 20 MG/ML
5 INJECTION INTRAMUSCULAR; INTRAVENOUS EVERY 10 MIN PRN
Status: COMPLETED | OUTPATIENT
Start: 2018-10-31 | End: 2018-10-31

## 2018-10-31 RX ORDER — GABAPENTIN 300 MG/1
300 CAPSULE ORAL 3 TIMES DAILY
Status: DISCONTINUED | OUTPATIENT
Start: 2018-10-31 | End: 2018-11-13 | Stop reason: HOSPADM

## 2018-10-31 RX ORDER — MYCOPHENOLATE MOFETIL 500 MG/1
500 TABLET ORAL 2 TIMES DAILY
COMMUNITY
End: 2019-02-08 | Stop reason: SDUPTHER

## 2018-10-31 RX ORDER — LIDOCAINE HYDROCHLORIDE 10 MG/ML
1 INJECTION, SOLUTION EPIDURAL; INFILTRATION; INTRACAUDAL; PERINEURAL ONCE
Status: CANCELLED | OUTPATIENT
Start: 2018-10-31 | End: 2018-10-31

## 2018-10-31 RX ORDER — FLUTICASONE FUROATE AND VILANTEROL 100; 25 UG/1; UG/1
1 POWDER RESPIRATORY (INHALATION) DAILY
Status: DISCONTINUED | OUTPATIENT
Start: 2018-10-31 | End: 2018-10-31

## 2018-10-31 RX ORDER — SODIUM CHLORIDE, SODIUM LACTATE, POTASSIUM CHLORIDE, CALCIUM CHLORIDE 600; 310; 30; 20 MG/100ML; MG/100ML; MG/100ML; MG/100ML
INJECTION, SOLUTION INTRAVENOUS CONTINUOUS PRN
Status: DISCONTINUED | OUTPATIENT
Start: 2018-10-31 | End: 2018-10-31

## 2018-10-31 RX ORDER — IBUPROFEN 200 MG
16 TABLET ORAL
Status: DISCONTINUED | OUTPATIENT
Start: 2018-10-31 | End: 2018-11-04

## 2018-10-31 RX ORDER — ONDANSETRON 2 MG/ML
4 INJECTION INTRAMUSCULAR; INTRAVENOUS EVERY 8 HOURS PRN
Status: DISCONTINUED | OUTPATIENT
Start: 2018-10-31 | End: 2018-11-13 | Stop reason: HOSPADM

## 2018-10-31 RX ORDER — HYDROCODONE BITARTRATE AND ACETAMINOPHEN 10; 325 MG/1; MG/1
1 TABLET ORAL EVERY 4 HOURS PRN
Status: DISCONTINUED | OUTPATIENT
Start: 2018-10-31 | End: 2018-11-06

## 2018-10-31 RX ORDER — ONDANSETRON 8 MG/1
8 TABLET, ORALLY DISINTEGRATING ORAL EVERY 8 HOURS PRN
Status: DISCONTINUED | OUTPATIENT
Start: 2018-10-31 | End: 2018-11-13 | Stop reason: HOSPADM

## 2018-10-31 RX ORDER — MORPHINE SULFATE 10 MG/ML
2 INJECTION INTRAMUSCULAR; INTRAVENOUS; SUBCUTANEOUS
Status: COMPLETED | OUTPATIENT
Start: 2018-10-31 | End: 2018-10-31

## 2018-10-31 RX ORDER — LIDOCAINE HYDROCHLORIDE 10 MG/ML
INJECTION, SOLUTION EPIDURAL; INFILTRATION; INTRACAUDAL; PERINEURAL
Status: DISCONTINUED | OUTPATIENT
Start: 2018-10-31 | End: 2018-10-31 | Stop reason: HOSPADM

## 2018-10-31 RX ORDER — PREDNISONE 5 MG/1
5 TABLET ORAL DAILY
Status: DISCONTINUED | OUTPATIENT
Start: 2018-10-31 | End: 2018-11-13 | Stop reason: HOSPADM

## 2018-10-31 RX ORDER — HYDRALAZINE HYDROCHLORIDE 25 MG/1
25 TABLET, FILM COATED ORAL EVERY 8 HOURS PRN
Status: DISCONTINUED | OUTPATIENT
Start: 2018-10-31 | End: 2018-11-04

## 2018-10-31 RX ORDER — PROPOFOL 10 MG/ML
VIAL (ML) INTRAVENOUS CONTINUOUS PRN
Status: DISCONTINUED | OUTPATIENT
Start: 2018-10-31 | End: 2018-10-31

## 2018-10-31 RX ORDER — BUPIVACAINE HYDROCHLORIDE 5 MG/ML
INJECTION, SOLUTION EPIDURAL; INTRACAUDAL
Status: DISCONTINUED | OUTPATIENT
Start: 2018-10-31 | End: 2018-10-31 | Stop reason: HOSPADM

## 2018-10-31 RX ORDER — MEPERIDINE HYDROCHLORIDE 25 MG/ML
12.5 INJECTION INTRAMUSCULAR; INTRAVENOUS; SUBCUTANEOUS ONCE
Status: COMPLETED | OUTPATIENT
Start: 2018-10-31 | End: 2018-10-31

## 2018-10-31 RX ADMIN — HYDROCODONE BITARTRATE AND ACETAMINOPHEN 1 TABLET: 10; 325 TABLET ORAL at 07:10

## 2018-10-31 RX ADMIN — HYDRALAZINE HYDROCHLORIDE 5 MG: 20 INJECTION INTRAMUSCULAR; INTRAVENOUS at 02:10

## 2018-10-31 RX ADMIN — TACROLIMUS 1.5 MG: 1 CAPSULE ORAL at 11:10

## 2018-10-31 RX ADMIN — VANCOMYCIN HYDROCHLORIDE 1250 MG: 10 INJECTION, POWDER, LYOPHILIZED, FOR SOLUTION INTRAVENOUS at 08:10

## 2018-10-31 RX ADMIN — MORPHINE SULFATE 2 MG: 10 INJECTION INTRAVENOUS at 03:10

## 2018-10-31 RX ADMIN — SODIUM BICARBONATE 650 MG TABLET 2600 MG: at 08:10

## 2018-10-31 RX ADMIN — METOPROLOL TARTRATE 25 MG: 25 TABLET ORAL at 11:10

## 2018-10-31 RX ADMIN — METOPROLOL TARTRATE 25 MG: 25 TABLET ORAL at 08:10

## 2018-10-31 RX ADMIN — SODIUM CHLORIDE, SODIUM LACTATE, POTASSIUM CHLORIDE, AND CALCIUM CHLORIDE: 600; 310; 30; 20 INJECTION, SOLUTION INTRAVENOUS at 03:10

## 2018-10-31 RX ADMIN — SODIUM BICARBONATE 650 MG TABLET 2600 MG: at 11:10

## 2018-10-31 RX ADMIN — PIPERACILLIN SODIUM AND TAZOBACTAM SODIUM 4.5 G: 4; .5 INJECTION, POWDER, LYOPHILIZED, FOR SOLUTION INTRAVENOUS at 10:10

## 2018-10-31 RX ADMIN — PIPERACILLIN SODIUM AND TAZOBACTAM SODIUM 4.5 G: 4; .5 INJECTION, POWDER, LYOPHILIZED, FOR SOLUTION INTRAVENOUS at 05:10

## 2018-10-31 RX ADMIN — PROPOFOL 800 MG/HR: 10 INJECTION, EMULSION INTRAVENOUS at 03:10

## 2018-10-31 RX ADMIN — PREDNISONE 5 MG: 5 TABLET ORAL at 11:10

## 2018-10-31 RX ADMIN — GABAPENTIN 300 MG: 300 CAPSULE ORAL at 08:10

## 2018-10-31 RX ADMIN — CEFAZOLIN SODIUM 2 G: 2 SOLUTION INTRAVENOUS at 03:10

## 2018-10-31 RX ADMIN — MEPERIDINE HYDROCHLORIDE 12.5 MG: 25 INJECTION INTRAMUSCULAR; INTRAVENOUS; SUBCUTANEOUS at 10:10

## 2018-10-31 RX ADMIN — MORPHINE SULFATE 2 MG: 10 INJECTION INTRAVENOUS at 02:10

## 2018-10-31 NOTE — ASSESSMENT & PLAN NOTE
- with delayed wound healing secondary to non-compliance with podiatry recommendations (patient reports getting foot wet and bearing weight)  - Defer care to podiatry  - See plan as per above

## 2018-10-31 NOTE — ASSESSMENT & PLAN NOTE
1. S/p DDRT in 2016 : stable allograft fn , Cr at baseline at 1.5 mg/dl, serum creatinine fluctuates between 1.5 and 2 mg/dL,     cont Prograf 1.5 mg bid and Pred 5 mg daily , was not CellCept 500 mg twice a day, this medication will be held due to cellulitis of his leg,     2. HTN : BP controlled,      3. Left foot infection,  Podiatry and ID  consulted, likely for surgery today,     4. Metabolic acidosis : cont Bicarb supplements      5. Anemia of CKD : stable Hb     6. DM-2 - per medicine team

## 2018-10-31 NOTE — CONSULTS
[facility] -   Podiatry  Consult Note    Patient Name: Lavelle Ladd  MRN: [id number]  Admission Date: 10/31/2018  Hospital Length of Stay: 0 days  Attending Physician: Karla Wheeler DPM  Primary Care Provider: Rishabh Esteban MD     Inpatient consult to Podiatry  Consult performed by: Karla Wheeler DPM  Consult ordered by: Sheyla Robbins NP  Reason for consult: left foot infection         Subjective:     History of Present Illness: Lavelle Ladd is a 63 y.o. male w/ PMH of PVD, Hep C, Right BKA, Kidney transplant, Uncontrolled DM2 and multiple other medical comoribdities who was seen at bedside this PM.  Patient was sent to hospital  for direct admission for surgical intervention consisting of incision and drainage of left transmetatarsal amputation site after presenting to my outpatient clinic with signs of infection.  Patient has long history of poor compliance and peripheral angiopathy complicated with uncontrolled diabetes.  Patient is immunocompromised and has had issues with healing the surgical site.  He underwent secondary wound closure 2 weeks ago and has been lost to follow-up.  He presented to my outpatient clinic with cellulitis of the amputation site. He has been NPO since midnight. Plan for surgical intervention scheduled this afternoon.    Scheduled Meds:   amLODIPine  2.5 mg Oral Daily    arformoterol  15 mcg Nebulization Q12H    [START ON 11/1/2018] aspirin  81 mg Oral Daily    budesonide  0.5 mg Nebulization Q12H    gabapentin  300 mg Oral TID    metoprolol tartrate  25 mg Oral BID    nozaseptin   Each Nare BID    piperacillin-tazobactam (ZOSYN) IVPB  4.5 g Intravenous Q8H    predniSONE  5 mg Oral Daily    sodium bicarbonate  2,600 mg Oral BID    tacrolimus  1.5 mg Oral BID     Continuous Infusions:  PRN Meds:ceFAZolin (ANCEF) IVPB, dextrose 50%, dextrose 50%, glucagon (human recombinant), glucose, glucose, HYDROcodone-acetaminophen, insulin aspart  U-100, ondansetron, ondansetron, sodium chloride 0.9%    Review of patient's allergies indicates:  No Known Allergies     Past Medical History:   Diagnosis Date    DINORAH (acute kidney injury) 2016    Arthritis     Chronic obstructive pulmonary disease 2016    Coronary artery disease involving native coronary artery of native heart without angina pectoris 2016     donor kidney transplant for DM 16     Induction with Thymo x3 and IV solumedrol to total 875mg  Kidney Biopsy  2016: 16 glomeruli, ACR type 1 AVR type 2, significant microcirculatory changes, c4d negative, No DSA, 5 to10% fibrosis. Treated with thymo x8 2016- no rejection      Diastolic heart failure     Encounter for blood transfusion     ESRD on RRT since 10/2013 10/29/2013    Biopsy proven diabetic nephropathy and lymphoplasmacytic interstitial infiltrate not c/w with AIN (ddx sjogrens or assoc with tamm-horsefall protein extravasation)     GERD (gastroesophageal reflux disease)     History of hepatitis C, s/p successful Rx w/ SVR12 - 2017    Completed 12 weeks harvoni w/ SVR    Hyperlipidemia     Hypertension     PIC line (peripherally inserted central catheter) flush     Prophylactic immunotherapy     Proteinuria     PVD (peripheral vascular disease) 2017    RLE BKA CT 16 Extensive atherosclerotic disease of the aorta and mesenteric arteries.     Renal hypertension     Type 2 diabetes mellitus with diabetic neuropathy, with long-term current use of insulin 2016    Vitamin B12 deficiency      Past Surgical History:   Procedure Laterality Date    AMPUTATION, FOOT, TRANSMETATARSAL Left 2018    Performed by Karla Wheeler DPM at [facility] OR    AORTOGRAPHY WITH SERIALOGRAPHY N/A 2018    Procedure: LEFT LEG ANGIOGRAM;  Surgeon: Donal Mcdonald MD;  Location: [facility] CATH LAB;  Service: Vascular;  Laterality: N/A;    av bovine graft      Left UE    AV FISTULA PLACEMENT       left UE    BIOPSY Tranplanted Kidney N/A 8/15/2016    Performed by Paulette Hanna [facility] at [facility] OR 2ND FLR    BIOPSY, LIVER, TRANSJUGULAR APPROACH N/A 2014    Performed by [facility] Provider at [facility] CATH LAB    CARDIAC CATHETERIZATION  2015    CLOSURE OF WOUND Left 2018    Procedure: CLOSURE, WOUND;  Surgeon: Karla Wheeler DPM;  Location: [facility] OR;  Service: Podiatry;  Laterality: Left;  Secondary Wound closure, extensive    CLOSURE, WOUND Left 2018    Performed by Karla Wheeler DPM at [facility] OR    FOOT AMPUTATION THROUGH METATARSAL Left 2018    Procedure: AMPUTATION, FOOT, TRANSMETATARSAL;  Surgeon: Karla Wheeler DPM;  Location: [facility] OR;  Service: Podiatry;  Laterality: Left;    INSERTION, CATHETER, VASCULAR N/A 10/31/2013    Performed by Ralph Mckeon MD at [facility] CATH LAB    IRRIGATION AND DEBRIDEMENT Left 2018    Performed by Katerina Magaña DPM at [facility] OR    KIDNEY TRANSPLANT  2016    LEFT LEG ANGIOGRAM N/A 2018    Performed by Donal Mcdonald MD at [facility] CATH LAB    LEG AMPUTATION THROUGH KNEE      right LE, started as nail puncture leading to diabetic ulcer    LENGTHENING, TENDON, ACHILLES Left 2018    Performed by Karla Wheeler DPM at [facility] OR    TRANSPLANT-KIDNEY Right 2016    Performed by Ronny Bergeron MD at [facility] OR 2ND FLR       Family History     Problem Relation (Age of Onset)    Cancer Father    Diabetes Father    Heart failure Father, Mother    Stroke Father        Tobacco Use    Smoking status: Former Smoker     Packs/day: 1.00     Years: 40.00     Pack years: 40.00     Last attempt to quit: 2013     Years since quittin.8    Smokeless tobacco: Never Used   Substance and Sexual Activity    Alcohol use: Yes     Alcohol/week: 3.6 oz     Types: 6 Cans of beer per week     Comment: 6 beers/week    Drug use: No    Sexual activity: No     Review of Systems  Objective:     Vital Signs (Most  Recent):  Temp: 97.8 °F (36.6 °C) (10/31/18 1426)  Pulse: 66 (10/31/18 1455)  Resp: (!) 23 (10/31/18 1455)  BP: (!) 186/78 (10/31/18 1455)  SpO2: 97 % (10/31/18 1455) Vital Signs (24h Range):  Temp:  [97 °F (36.1 °C)-98 °F (36.7 °C)] 97.8 °F (36.6 °C)  Pulse:  [62-67] 66  Resp:  [10-23] 23  SpO2:  [96 %-98 %] 97 %  BP: (139-208)/(57-99) 186/78     Weight: 99.8 kg (220 lb 0.3 oz)  Body mass index is 30.69 kg/m².    Foot Exam    Neurovascular status - vascular status grossly intact.   There is malodor,  Sutures are well coapted and staples to incision sites, there is a mild serosanguineous drainage and central aspect of amputation stump with mild maceration. There is localized erythema to amputation stump, there is no ascending lymphangitis; posterior incision site at Achilles tendon is clean dry intact and no acute no acute signs of infection noted                   Laboratory:  Blood Cultures: No results for input(s): LABBLOO in the last 48 hours.  BMP:   Recent Labs   Lab 10/31/18  1052 10/31/18  1053   GLU  --  117*   NA  --  136   K  --  4.8   CL  --  101   CO2  --  23   BUN  --  20   CREATININE  --  1.5*   CALCIUM  --  9.5   MG 1.9  --      CBC:   Recent Labs   Lab 10/31/18  1054   WBC 7.39   RBC 4.76   HGB 13.9*   HCT 43.2      MCV 91   MCH 29.2   MCHC 32.2     CMP:   Recent Labs   Lab 10/31/18  1053   *   CALCIUM 9.5   ALBUMIN 3.3*   PROT 7.2      K 4.8   CO2 23      BUN 20   CREATININE 1.5*   ALKPHOS 86   ALT 12   AST 20   BILITOT 0.6     CRP:   Recent Labs   Lab 10/31/18  1053   CRP 1.2     ESR:   Recent Labs   Lab 10/31/18  1054   SEDRATE 14*     Microbiology Results (last 7 days)     Procedure Component Value Units Date/Time    Blood culture [[id number]]     Order Status:  Canceled Specimen:  Blood     Blood culture [[id number]]     Order Status:  Canceled Specimen:  Blood     Blood culture [[id number]] Collected:  10/31/18 1055    Order Status:  Sent Specimen:  Blood Updated:   10/31/18 1055    Blood culture [[id number]] Collected:  10/31/18 1054    Order Status:  Sent Specimen:  Blood Updated:  10/31/18 1055          Diagnostic Results:  X-Ray: I have reviewed all pertinent results/findings within the past 24 hours.  no acute findings on foot xray      Assessment/Plan:     Active Diagnoses:    Diagnosis Date Noted POA    PRINCIPAL PROBLEM:  Abscess of left foot [L02.612] 10/31/2018 Unknown    S/P transmetatarsal amputation of foot, left [Z89.432] 10/31/2018 Not Applicable    Non compliance with medical treatment [Z91.19] 10/31/2018 Not Applicable    Cellulitis of left foot [L03.116] 07/20/2018 Yes    Peripheral vascular disease [I73.9] 07/06/2018 Yes    Chronic bilateral low back pain with left-sided sciatica [M54.42, G89.29] 01/25/2018 Yes    Kidney transplant recipient [Z94.0] 04/07/2017 Not Applicable    Chronic kidney disease (CKD), stage III (moderate) [N18.3] 09/25/2016 Yes     Chronic    Chronic obstructive pulmonary disease [J44.9] 09/12/2016 Yes    Coronary artery disease involving native coronary artery of native heart without angina pectoris [I25.10] 07/01/2016 Yes    Type 2 diabetes mellitus with hyperglycemia, with long-term current use of insulin [E11.65, Z79.4]  Not Applicable    Essential hypertension [I10] 02/20/2015 Yes      Problems Resolved During this Admission:         Informed Consent has been given to the patient. Plan for Incision and drainage of left foot, revisional amputation pending further debridement of amputation flaps. The patient understands the surgery, procedure, risks, alternatives and complications, including but not limited to reaction to medication/anesthetics, bleeding, infection, continuous pain, incomplete pain relief, worse pain, complex regional pain syndrome/RSD, failure to relieve pain, incomplete bone and soft tissue healing, numbness, nerve damage, prolonged or incomplete correction, recurrence, foot/ankle deterioration, new  deformity, permanent edema, nerve or vascular damage, blood clots, pulmonary embolism, scarring, instability, limb length inequality, problems with motion and strength, failure of implants/fixation (if applicable), the possible need for more extensive surgery, the possible need for future surgery, and the distant possibility of myocardial infarction, stroke, loss of limb/life. We also discussed the expected benefits and alternatives of the procedure, including the consequences of not proceeding with the surgery, as well as the expected postop course. No guarantees were given nor implied. The procedure has been fully reviewed with the patient and written informed consent has been obtained. We have discussed the perioperative expectations. Having answered these questions and understanding the risks and benefits of the surgery, patient has opted to proceed with the surgery.     This will be a staged procedure, and final closure will not ensue until clinical signs of infection has resolved. Dispo planning- highly recommend LTAC and/or SNF placement.      Thank you for your consult.     Karla Wheeler DPM  Podiatry  [facility]

## 2018-10-31 NOTE — TRANSFER OF CARE
"Anesthesia Transfer of Care Note    Patient: Lavelle Ladd    Procedure(s) Performed: Procedure(s) (LRB):  Incision and Drainage, Left Foot Possible Revisional Amputation- Transmetatarsal (Left)    Patient location: PACU    Anesthesia Type: MAC    Transport from OR: Transported from OR on room air with adequate spontaneous ventilation    Post pain: adequate analgesia    Post assessment: no apparent anesthetic complications    Post vital signs: stable    Level of consciousness: awake    Nausea/Vomiting: no nausea/vomiting    Complications: none    Transfer of care protocol was followed      Last vitals:   Visit Vitals  BP (!) 176/91 (BP Location: Left arm, Patient Position: Lying)   Pulse 65   Temp 36.6 °C (97.8 °F) (Temporal)   Resp (!) 21   Ht 5' 11" (1.803 m)   Wt 99.8 kg (220 lb 0.3 oz)   SpO2 96%   BMI 30.69 kg/m²     "

## 2018-10-31 NOTE — ASSESSMENT & PLAN NOTE
- Admit to Medicine  - Podiatry following and plans for surgery today  - Wound cx and BD pending, will need to f/u  - Continue local wound care post-op per podiatry rec  - Start Vanc and Zosyn post-operatively  - ID consult to guide ABX given h/o MSSA in wound on 9/21  - Monitor

## 2018-10-31 NOTE — CONSULTS
[facility]  Nephrology  Consult Note      Patient Name: Lavelle Ladd  MRN: [id number]  Admission Date: 10/31/2018  Hospital Length of Stay: 0 days  Attending Provider: Karla Wheeler DPM   Primary Care Physician: Rishabh Esteban MD  Principal Problem:Abscess of left foot    Consults  Subjective:     HPI: Lavelle Ladd is a pleasant 63 y old  man  , s/p   donor ( brain death ) kidney transplant on 16.  He has CKD stage 3 - GFR 30-59 and his baseline creatinine is between 1.5-1.8. He takes  prednisone 5 mg daily and tacrolimus 1.5 mg twice a day , for maintenance immunosuppression.  He he is also on CellCept 500 mg twice a day, will hold this medication due to cellulitis of his left foot, he is status post left transmetatarsal amputation on 2018, patient was seen for follow-up appointment in the clinic yesterday and notice that his foot was infected, patient was admitted to the hospital for further management,      Past Medical History:   Diagnosis Date    DINORAH (acute kidney injury) 2016    Arthritis     Chronic obstructive pulmonary disease 2016    Coronary artery disease involving native coronary artery of native heart without angina pectoris 2016     donor kidney transplant for DM 16     Induction with Thymo x3 and IV solumedrol to total 875mg  Kidney Biopsy  2016: 16 glomeruli, ACR type 1 AVR type 2, significant microcirculatory changes, c4d negative, No DSA, 5 to10% fibrosis. Treated with thymo x8 2016- no rejection      Diastolic heart failure     Encounter for blood transfusion     ESRD on RRT since 10/2013 10/29/2013    Biopsy proven diabetic nephropathy and lymphoplasmacytic interstitial infiltrate not c/w with AIN (ddx sjogrens or assoc with tamm-horsefall protein extravasation)     GERD (gastroesophageal reflux disease)     History of hepatitis C, s/p successful Rx w/ SVR12 - 2017    Completed 12  weeks harvoni w/ SVR    Hyperlipidemia     Hypertension     PIC line (peripherally inserted central catheter) flush     Prophylactic immunotherapy     Proteinuria     PVD (peripheral vascular disease) 6/26/2017    RLE BKA CT 12/11/16 Extensive atherosclerotic disease of the aorta and mesenteric arteries.     Renal hypertension     Type 2 diabetes mellitus with diabetic neuropathy, with long-term current use of insulin 12/1/2016    Vitamin B12 deficiency        Past Surgical History:   Procedure Laterality Date    AMPUTATION, FOOT, TRANSMETATARSAL Left 9/21/2018    Performed by Karla Wheeler DPM at [facility] OR    AORTOGRAPHY WITH SERIALOGRAPHY N/A 6/14/2018    Procedure: LEFT LEG ANGIOGRAM;  Surgeon: Donal Mcdonald MD;  Location: [facility] CATH LAB;  Service: Vascular;  Laterality: N/A;    av bovine graft      Left UE    AV FISTULA PLACEMENT      left UE    BIOPSY Tranplanted Kidney N/A 8/15/2016    Performed by Paulette Hanna MD at [facility] OR 2ND FLR    BIOPSY, LIVER, TRANSJUGULAR APPROACH N/A 4/24/2014    Performed by [facility] Provider at [facility] CATH LAB    CARDIAC CATHETERIZATION  02/2015    CLOSURE OF WOUND Left 9/24/2018    Procedure: CLOSURE, WOUND;  Surgeon: Karla Wheeler DPM;  Location: [facility] OR;  Service: Podiatry;  Laterality: Left;  Secondary Wound closure, extensive    CLOSURE, WOUND Left 9/24/2018    Performed by Karla Wheeler DPM at [facility] OR    FOOT AMPUTATION THROUGH METATARSAL Left 9/21/2018    Procedure: AMPUTATION, FOOT, TRANSMETATARSAL;  Surgeon: Karla Wheeler DPM;  Location: [facility] OR;  Service: Podiatry;  Laterality: Left;    INSERTION, CATHETER, VASCULAR N/A 10/31/2013    Performed by Ralph Mckeon MD at [facility] CATH LAB    IRRIGATION AND DEBRIDEMENT Left 7/9/2018    Performed by Katerina Magaña DPM at [facility] OR    KIDNEY TRANSPLANT  05/21/2016    LEFT LEG ANGIOGRAM N/A 6/14/2018    Performed by Donal Mcdonald MD at [facility] CATH LAB    LEG AMPUTATION THROUGH KNEE   2011    right LE, started as nail puncture leading to diabetic ulcer    LENGTHENING, TENDON, ACHILLES Left 2018    Performed by Karla Wheeler DPM at [facility] OR    TRANSPLANT-KIDNEY Right 2016    Performed by Ronny Bergeron MD at [facility] OR [address] [place] [address]       Review of patient's allergies indicates:  No Known Allergies  Current Facility-Administered Medications   Medication Frequency    amLODIPine tablet 2.5 mg Daily    arformoterol nebulizer solution 15 mcg Q12H    [START ON 2018] aspirin EC tablet 81 mg Daily    budesonide nebulizer solution 0.5 mg Q12H    dextrose 50% injection 12.5 g PRN    dextrose 50% injection 25 g PRN    gabapentin capsule 300 mg TID    glucagon (human recombinant) injection 1 mg PRN    glucose chewable tablet 16 g PRN    glucose chewable tablet 24 g PRN    hydrALAZINE injection 5 mg Q10 Min PRN    HYDROcodone-acetaminophen  mg per tablet 1 tablet Q4H PRN    insulin aspart U-100 pen 0-5 Units QID (AC + HS) PRN    metoprolol tartrate (LOPRESSOR) tablet 25 mg BID    nozaseptin (NOZIN) nasal  BID    ondansetron disintegrating tablet 8 mg Q8H PRN    ondansetron injection 4 mg Q8H PRN    piperacillin-tazobactam 4.5 g in dextrose 5 % 100 mL IVPB (ready to mix system) Q8H    predniSONE tablet 5 mg Daily    sodium bicarbonate tablet 2,600 mg BID    sodium chloride 0.9% flush 5 mL PRN    tacrolimus capsule 1.5 mg BID     Family History     Problem Relation (Age of Onset)    Cancer Father    Diabetes Father    Heart failure Father, Mother    Stroke Father        Tobacco Use    Smoking status: Former Smoker     Packs/day: 1.00     Years: 40.00     Pack years: 40.00     Last attempt to quit: 2013     Years since quittin.8    Smokeless tobacco: Never Used   Substance and Sexual Activity    Alcohol use: Yes     Alcohol/week: 3.6 oz     Types: 6 Cans of beer per week     Comment: 6 beers/week    Drug use: No    Sexual activity: No     Review of  Systems   Constitutional: Positive for activity change and fatigue. Negative for appetite change.   HENT: Negative for congestion and facial swelling.    Eyes: Negative for pain, discharge and redness.   Respiratory: Negative for apnea, cough and chest tightness.    Cardiovascular: Negative for chest pain, palpitations and leg swelling.   Gastrointestinal: Negative for abdominal distention.   Genitourinary: Negative for difficulty urinating, dysuria and frequency.   Musculoskeletal: Positive for arthralgias. Negative for neck pain and neck stiffness.        Left foot discomfort    Skin: Negative for color change, rash and wound.   Neurological: Negative for dizziness, weakness and numbness.   Psychiatric/Behavioral: Negative for sleep disturbance.   All other systems reviewed and are negative.    Objective:     Vital Signs (Most Recent):  Temp: 98 °F (36.7 °C) (10/31/18 1049)  Pulse: 62 (10/31/18 1049)  Resp: 16 (10/31/18 1049)  BP: 139/82 (10/31/18 1049)  SpO2: 96 % (10/31/18 1049)  O2 Device (Oxygen Therapy): room air (10/31/18 1049) Vital Signs (24h Range):  Temp:  [97 °F (36.1 °C)-98 °F (36.7 °C)] 98 °F (36.7 °C)  Pulse:  [62-64] 62  Resp:  [16] 16  SpO2:  [96 %] 96 %  BP: (139-161)/(82-90) 139/82     Weight: 99.8 kg (220 lb 0.3 oz) (10/31/18 1049)  Body mass index is 30.69 kg/m².  Body surface area is 2.24 meters squared.    No intake/output data recorded.    Physical Exam   Constitutional: He is oriented to person, place, and time. He appears well-developed and well-nourished. No distress.   HENT:   Head: Normocephalic and atraumatic.   Eyes: Pupils are equal, round, and reactive to light.   Neck: Normal range of motion. Neck supple. No tracheal deviation present. No thyromegaly present.   Cardiovascular: Normal rate, regular rhythm, normal heart sounds and intact distal pulses. Exam reveals no gallop and no friction rub.   No murmur heard.  Pulmonary/Chest: Effort normal and breath sounds normal. He has no  wheezes. He has no rales.   Abdominal: Soft. He exhibits no mass. There is no tenderness. There is no rebound and no guarding.   No allograft tenderness    Musculoskeletal: Normal range of motion. He exhibits no edema.   S/p right BKS, Left foot in dressings, s/p left transmetatarsal amputation   Lymphadenopathy:     He has no cervical adenopathy.   Neurological: He is alert and oriented to person, place, and time.   Skin: Skin is warm. No rash noted. He is not diaphoretic. No erythema.   Nursing note and vitals reviewed.      Significant Labs:  CBC:   Recent Labs   Lab 10/31/18  1054   WBC 7.39   RBC 4.76   HGB 13.9*   HCT 43.2      MCV 91   MCH 29.2   MCHC 32.2     CMP:   Recent Labs   Lab 10/31/18  1053   *   CALCIUM 9.5   ALBUMIN 3.3*   PROT 7.2      K 4.8   CO2 23      BUN 20   CREATININE 1.5*   ALKPHOS 86   ALT 12   AST 20   BILITOT 0.6     Coagulation:   Recent Labs   Lab 10/31/18  1126   INR 1.0     LFTs:   Recent Labs   Lab 10/31/18  1053   ALT 12   AST 20   ALKPHOS 86   BILITOT 0.6   PROT 7.2   ALBUMIN 3.3*     All labs within the past 24 hours have been reviewed.    Significant Imaging:  Reviewed     Lab Results   Component Value Date    .0 (H) 10/30/2017    CALCIUM 9.5 10/31/2018    CAION 1.10 05/30/2016    PHOS 2.9 10/31/2018         Assessment/Plan:     Kidney transplant recipient    1. S/p DDRT in 2016 : stable allograft fn , Cr at baseline at 1.5 mg/dl, serum creatinine fluctuates between 1.5 and 2 mg/dL,     cont Prograf 1.5 mg bid and Pred 5 mg daily , was not CellCept 500 mg twice a day, this medication will be held due to cellulitis of his leg,     2. HTN : BP controlled,      3. Left foot infection,  Podiatry and ID  consulted, likely for surgery today,     4. Metabolic acidosis : cont Bicarb supplements      5. Anemia of CKD : stable Hb     6. DM-2 - per medicine team               Thank you for your consult. I will follow-up with patient. Please contact us if  you have any additional questions.     Total time spent 70 minutes including time needed to review the records,  patient  evaluation, documentation, face-to-face discussion with the patient,  primary team,   more than 50% of the time was spent on coordination of care and counseling.       Carlos Staley MD   Nephrology  [facility]

## 2018-10-31 NOTE — OP NOTE
[facility]  Surgery Department  Operative Note    SUMMARY     Date of Procedure: 10/31/2018     Procedure: Procedure(s) (LRB):  Incision and Drainage, Left Foot Possible Revisional Amputation- Transmetatarsal (Left)     Surgeon(s) and Role:     * Karla Wheeler DPM - Primary    Assisting Surgeon: None    Pre-Operative Diagnosis:   Left Foot Abscess  Cellulitis of left foot [L03.116]    Post-Operative Diagnosis: Post-Op Diagnosis Codes:  Left Foot Abscess  Cellulitis of Left foot     Anesthesia: Local Mac with 20 ccs of 1:1 mixture 1% lidocaine plain and 0.5% marcain plain     Technical Procedures Used:   1. Left foot Irrigation and debridement to bone  2. Revisional Transmetatarsal Amputation, Left foot    Description of the Findings of the Procedure:   The patient was brought to operating room and placed on table in supine position.  A time out was performed to confirm correct patient identification and surgical procedure. Anesthesia was performed by the CRNA/Anesthesiologist. A well padded ankle tourniquet was applied to the operative extremity.  The operative foot was then scrubbed, prepped and draped in usual sterile manner.       Left Foot Irrigation and Debridement to Bone  Attention was directed to the amputation stump of the left lower extremity. The sutures and staples were removed from the skin flaps. The wound was then opened and there was a mild amount of purulent drainage expressed deep to subcutaneous tissue. There was noted fibrotic tissue and soft non viable bone which was sharply debrided with use of # 15 blade and rongeur. The wound was milked from proximal to distally and inspected plantarly and dorsally. No further purulent drainage noted.  There was adequate bleeding noted and hemostasis was obtained with electrocautery. The soft tissue was freed from the metatarsal bones in order to resect bone. The power sagittal saw was utilized to remove the first metatarsal, fourth and  fifth metatarsal further proximally. Care was taken to leave a good parabola shape of distal bones with no rohit prominences. The wound was then copiously irrigated with 3 L of pulse lavage with normal Saline. Deep wound cultures were taken from the site.  The remaining tissue was evaluated and no further necrosis was noted. The skin flaps were remodeled with use of #15 blade. Good approximation of skin flaps with no evidence of sharp rohit prominences plantarly was noted. The wound was then packed open with plain packing gauze. Retention sutures were placed on the skin flaps with 2-0 nylon sutures. The incision site was dressed with adaptic, gauze, and a dry sterile dressing. The patient tolerated procedure and anesthesia well and was transferred to recovery room with vital signs stable and vascular status to pre operative level. The patient will be transferred back to the medical surgical floor for post operative monitoring. This is a staged procedure in which final wound closure will be performed in 48-72 hours pending clinical improvement.     Significant Surgical Tasks Conducted by the Assistant(s), if Applicable: N/A    Complications: No    Estimated Blood Loss (EBL): * No values recorded between 10/31/2018  3:58 PM and 10/31/2018  4:53 PM *           Implants: * No implants in log *    Specimens:   Specimen (12h ago, onward)    None                  Condition: Good    Disposition: PACU - hemodynamically stable.    Attestation: I performed the procedure.

## 2018-10-31 NOTE — H&P
[facility] Hospital Medicine  History & Physical    Patient Name: Lavelle Ladd  MRN: [id number]  Admission Date: 10/31/2018  Attending Physician: Karla Wheeler DPM   Primary Care Provider: Rishabh Esteban MD         Patient information was obtained from patient, past medical records and ER records.     Subjective:     Principal Problem:Abscess of left foot    Chief Complaint:   Chief Complaint   Patient presents with    Non-healing Wound Follow Up        HPI: Lavelle Ladd is a 63 y.o. male  with a PMHx of COPD, CAD, Diastolic CHF, CKD, Renal transplant, GERD, Hepatitis C, HLD, HTN, PVD, and IDDM who presented to the hospital today as a direct admit for planned surgery today per Dr. Wheeler.  Left foot xray today showed amputation of the distal aspect of the left foot at the level of the metatarsal bones.  No lytic abnormalities to suggest osteomyelitis by plain radiograph.  No evidence of acute fracture or dislocation.  Bony mineralization is normal.  Diffuse soft tissue and dense vascular calcifications.  Large plantar calcaneal spur.  He is s/p left transmetatarsal amputation secondary to gangrene of multiple digits and JOSE R.  Patient underwent delayed primary wound closure, on 9/24/18.  He presented to podiatry today for his 2 week follow-up of wound closure 2/2 to wound dehiscence.  Patient missed his last postoperative visit as he states he was unable to get a ride to his appointment.  He also admits to ambulating on his amputation site.  He drove himself to clinic today as he was unable to get a ride.  He states that he has gotten his foot wet as he has taken showers covering extremity in a garbage bag.  He denies any fever, chills, foot pain, CP, SOB, N/V/D, and all other symptoms at this time.  Patient has a history of poor compliance and difficult living dispo as he lives alone.  In the past we did try to get patient admitted to skilled nursing facility, but he has refused  such services.   Wound cx on  showed MSSA for which he has completed IV cefazolin for 14 days secondary to MSSA gangrene.  He has been NPO since midnight.  He will be admitted to the Medicine unit under the care of Hospital Medicine.  ID consulted per podiatry recs to guide antibiotic therapy postoperatively.  He is a Full Code.  His SDM is his sister Kecia Williamson who can be reached at [phone].                Past Medical History:   Diagnosis Date    DINORAH (acute kidney injury) 2016    Arthritis     Chronic obstructive pulmonary disease 2016    Coronary artery disease involving native coronary artery of native heart without angina pectoris 2016     donor kidney transplant for DM 16     Induction with Thymo x3 and IV solumedrol to total 875mg  Kidney Biopsy  2016: 16 glomeruli, ACR type 1 AVR type 2, significant microcirculatory changes, c4d negative, No DSA, 5 to10% fibrosis. Treated with thymo x8 2016- no rejection      Diastolic heart failure     Encounter for blood transfusion     ESRD on RRT since 10/2013 10/29/2013    Biopsy proven diabetic nephropathy and lymphoplasmacytic interstitial infiltrate not c/w with AIN (ddx sjogrens or assoc with tamm-horsefall protein extravasation)     GERD (gastroesophageal reflux disease)     History of hepatitis C, s/p successful Rx w/ SVR - 2017    Completed 12 weeks harvoni w/ SVR    Hyperlipidemia     Hypertension     PIC line (peripherally inserted central catheter) flush     Prophylactic immunotherapy     Proteinuria     PVD (peripheral vascular disease) 2017    RLE BKA CT 16 Extensive atherosclerotic disease of the aorta and mesenteric arteries.     Renal hypertension     Type 2 diabetes mellitus with diabetic neuropathy, with long-term current use of insulin 2016    Vitamin B12 deficiency        Past Surgical History:   Procedure Laterality Date    AMPUTATION, FOOT,  TRANSMETATARSAL Left 9/21/2018    Performed by Kalra Wheeler DPM at [facility] OR    AORTOGRAPHY WITH SERIALOGRAPHY N/A 6/14/2018    Procedure: LEFT LEG ANGIOGRAM;  Surgeon: Donal Mcdonald MD;  Location: [facility] CATH LAB;  Service: Vascular;  Laterality: N/A;    av bovine graft      Left UE    AV FISTULA PLACEMENT      left UE    BIOPSY Tranplanted Kidney N/A 8/15/2016    [facility]    BIOPSY, LIVER, TRANSJUGULAR APPROACH N/A 4/24/2014    Performed by [facility] Diagnostic Provider at [facility] CATH LAB    CARDIAC CATHETERIZATION  02/2015    CLOSURE OF WOUND Left 9/24/2018    Procedure: CLOSURE, WOUND;  Surgeon: Karla Wheeler DPM;  Location: [facility] OR;  Service: Podiatry;  Laterality: Left;  Secondary Wound closure, extensive    CLOSURE, WOUND Left 9/24/2018    Performed by Karla Wheeler DPM at [facility] OR    FOOT AMPUTATION THROUGH METATARSAL Left 9/21/2018    Procedure: AMPUTATION, FOOT, TRANSMETATARSAL;  Surgeon: Karla Wheeler DPM;  Location: [facility] OR;  Service: Podiatry;  Laterality: Left;    INSERTION, CATHETER, VASCULAR N/A 10/31/2013    Performed by Ralph Mckeon MD at [facility] CATH LAB    IRRIGATION AND DEBRIDEMENT Left 7/9/2018    Performed by Katerina Magaña DPM at [facility] OR    KIDNEY TRANSPLANT  05/21/2016    LEFT LEG ANGIOGRAM N/A 6/14/2018    Performed by Donal Mcdonald MD at [facility] CATH LAB    LEG AMPUTATION THROUGH KNEE  2011    right LE, started as nail puncture leading to diabetic ulcer    LENGTHENING, TENDON, ACHILLES Left 9/24/2018    Performed by Karla Wheeler DPM at [facility] OR    TRANSPLANT-KIDNEY Right 5/21/2016    Performed by Ronny Bergeron MD at [facility] OR [address] [place] [address]       Review of patient's allergies indicates:  No Known Allergies    No current facility-administered medications on file prior to encounter.      Current Outpatient Medications on File Prior to Encounter   Medication Sig    amLODIPine (NORVASC) 2.5 MG tablet Take 1 tablet (2.5 mg  "total) by mouth once daily.    gabapentin (NEURONTIN) 300 MG capsule Take 300 mg by mouth 3 (three) times daily.     insulin NPH (NOVOLIN N) 100 unit/mL injection Take 25 units subq with breakfast and 15 units at bedtime    mycophenolate (CELLCEPT) 500 mg Tab Take 500 mg by mouth 2 (two) times daily.    predniSONE (DELTASONE) 5 MG tablet Take 1 tablet (5 mg total) by mouth once daily.    sodium bicarbonate 650 MG tablet Take 4 tablets (2,600 mg total) by mouth 2 (two) times daily.    tacrolimus (PROGRAF) 0.5 MG Cap Take 3 capsules (1.5 mg total) by mouth every 12 (twelve) hours. Z94.0 Kidney Transplant    aspirin (ECOTRIN) 81 MG EC tablet Take 1 tablet (81 mg total) by mouth once daily.    BD INSULIN PEN NEEDLE UF SHORT 31 gauge x 5/16" Ndle     BD INSULIN SYRINGE ULTRA-FINE 1 mL 31 gauge x 5/16 Syrg USE ONE AS DIRECTED FOUR TIMES DAILY WITH MEALS AND AT BEDTIME    blood sugar diagnostic Strp 1 each by Misc.(Non-Drug; Combo Route) route 3 (three) times daily.    blood-glucose meter kit Use as instructed    budesonide-formoterol 160-4.5 mcg (SYMBICORT) 160-4.5 mcg/actuation HFAA Inhale 2 puffs into the lungs every 12 (twelve) hours. Wash out mouth after using    ergocalciferol (ERGOCALCIFEROL) 50,000 unit Cap Take 1 capsule (50,000 Units total) by mouth every 7 days. Take on Mondays    HYDROcodone-acetaminophen (NORCO)  mg per tablet Take 1 tablet by mouth every 4 (four) hours as needed.    inhalation device (BREATHERITE VALVED MDI CHAMBER) Use as directed for inhalation.    lancets Misc 1 each by Misc.(Non-Drug; Combo Route) route 3 (three) times daily.    metoprolol tartrate (LOPRESSOR) 25 MG tablet Take 1 tablet (25 mg total) by mouth 2 (two) times daily.    NOVOLIN R REGULAR U-100 INSULN 100 unit/mL injection INJECT 6 UNITS WITH BREAKFAST AND 8 UNITS WITH DINNER, SUBCUTANEOUSLY 30 MIN BEFORE MEAL.    nozaseptin (NOZIN) nasal  1 each by Each Nare route 2 (two) times daily.    " "ondansetron (ZOFRAN-ODT) 4 MG TbDL Take 1 tablet (4 mg total) by mouth every 8 (eight) hours as needed.    pen needle, diabetic (EASY COMFORT PEN NEEDLES) 32 gauge x 5/32" Ndle Inject 1 each into the skin 3 (three) times daily.     Family History     Problem Relation (Age of Onset)    Cancer Father    Diabetes Father    Heart failure Father, Mother    Stroke Father        Tobacco Use    Smoking status: Former Smoker     Packs/day: 1.00     Years: 40.00     Pack years: 40.00     Last attempt to quit: 2013     Years since quittin.8    Smokeless tobacco: Never Used   Substance and Sexual Activity    Alcohol use: Yes     Alcohol/week: 3.6 oz     Types: 6 Cans of beer per week     Comment: 6 beers/week    Drug use: No    Sexual activity: No     Review of Systems   Constitutional: Negative for chills, diaphoresis, fatigue and fever.   HENT: Negative for hearing loss, mouth sores, sore throat, tinnitus and trouble swallowing.    Eyes: Negative for pain, discharge and redness.   Respiratory: Negative for apnea, cough, choking, chest tightness, shortness of breath, wheezing and stridor.    Cardiovascular: Negative for chest pain, palpitations and leg swelling.   Gastrointestinal: Negative for abdominal distention, abdominal pain, blood in stool, constipation, diarrhea, nausea, rectal pain and vomiting.   Endocrine: Negative for cold intolerance, heat intolerance, polydipsia, polyphagia and polyuria.   Genitourinary: Negative for difficulty urinating, dysuria, flank pain, frequency, hematuria and urgency.   Musculoskeletal: Negative for arthralgias, back pain, gait problem, joint swelling, neck pain and neck stiffness.   Skin: Positive for color change and wound. Negative for rash.        Left foot with worsening erythema.    Allergic/Immunologic: Negative for food allergies.   Neurological: Negative for dizziness, tremors, seizures, syncope, speech difficulty, light-headedness and headaches.   Hematological: " Negative for adenopathy. Does not bruise/bleed easily.   Psychiatric/Behavioral: Negative for agitation and confusion. The patient is not nervous/anxious.    All other systems reviewed and are negative.    Objective:     Vital Signs (Most Recent):  Temp: 98 °F (36.7 °C) (10/31/18 1049)  Pulse: 62 (10/31/18 1049)  Resp: 16 (10/31/18 1049)  BP: 139/82 (10/31/18 1049)  SpO2: 96 % (10/31/18 1049) Vital Signs (24h Range):  Temp:  [97 °F (36.1 °C)-98 °F (36.7 °C)] 98 °F (36.7 °C)  Pulse:  [62-64] 62  Resp:  [16] 16  SpO2:  [96 %] 96 %  BP: (139-161)/(82-90) 139/82     Weight: 99.8 kg (220 lb 0.3 oz)  Body mass index is 30.69 kg/m².    Physical Exam   Constitutional: He is oriented to person, place, and time. He appears well-developed and well-nourished. No distress.   HENT:   Head: Normocephalic and atraumatic.   Mouth/Throat: Oropharynx is clear and moist.   Eyes: Conjunctivae and EOM are normal. Pupils are equal, round, and reactive to light.   Neck: Normal range of motion. Neck supple. No JVD present.   Cardiovascular: Normal rate, regular rhythm, normal heart sounds and intact distal pulses. Exam reveals no gallop and no friction rub.   No murmur heard.  Pulmonary/Chest: Effort normal and breath sounds normal. No stridor. No respiratory distress. He has no wheezes. He has no rales. He exhibits no tenderness.   Abdominal: Soft. Bowel sounds are normal. He exhibits no distension and no mass. There is no tenderness. There is no rebound and no guarding.   Musculoskeletal: Normal range of motion. He exhibits no edema or tenderness.   Neurological: He is alert and oriented to person, place, and time. No cranial nerve deficit.   Skin: Skin is warm and dry. No rash noted. He is not diaphoretic. There is erythema.   LLE dressing CDI.  Pictures in clinic today show + erythema with some sutures still intact.  + malodor per podiatry (see pictures)   Psychiatric: He has a normal mood and affect. His speech is normal. Judgment and  thought content normal. He is withdrawn. Cognition and memory are normal. He is inattentive.   Nursing note and vitals reviewed.        CRANIAL NERVES     CN III, IV, VI   Pupils are equal, round, and reactive to light.  Extraocular motions are normal.                Significant Labs:   CBC:   Recent Labs   Lab 10/31/18  1054   WBC 7.39   HGB 13.9*   HCT 43.2        CMP:   Recent Labs   Lab 10/31/18  1053      K 4.8      CO2 23   *   BUN 20   CREATININE 1.5*   CALCIUM 9.5   PROT 7.2   ALBUMIN 3.3*   BILITOT 0.6   ALKPHOS 86   AST 20   ALT 12   ANIONGAP 12   EGFRNONAA 48*     Coagulation:   Recent Labs   Lab 10/31/18  1126   INR 1.0     Lactic Acid:   Recent Labs   Lab 10/31/18  1121   LACTATE 2.0       Significant Imaging: I have reviewed all pertinent imaging results/findings within the past 24 hours.             Assessment/Plan:     * Abscess of left foot    - Admit to Medicine  - Podiatry following and plans for surgery today  - Wound cx and BD pending, will need to f/u  - Continue local wound care post-op per podiatry rec  - Start Vanc and Zosyn post-operatively  - ID consult to guide ABX given h/o MSSA in wound on 9/21  - Monitor         Cellulitis of left foot    - See plan as per above       S/P transmetatarsal amputation of foot, left    - with delayed wound healing secondary to non-compliance with podiatry recommendations (patient reports getting foot wet and bearing weight)  - Defer care to podiatry  - See plan as per above       Kidney transplant recipient    - patient is noted to be a poor historian and does not know what medications he takes at home  - per d/w Edmundo Hernandez today, he is currently prescribed Prednisone 5 mg daily, Cellcept 500 mg BID, and Prograf 1.5 mg BID  - Nephrology consulted  - Continue home Prednisone and Prograf for now  - Hold Cellcept per Dr. Staley  - Monitor Prograf levels   - Followed by Dr. Blanco in [place]        Chronic kidney disease (CKD),  stage III (moderate)    - Cr stable at 1.5  - Daily BMP  - Monitor       Peripheral vascular disease    - Resume home ASA tomorrow  - Vascular f/u as needed       Type 2 diabetes mellitus with hyperglycemia, with long-term current use of insulin    - A1c 8.0 in September  - Accu checks ACHS with SSI PRN  - Resume home insulin once patient is eating  - ADA diet after surgery  - Monitor       Chronic bilateral low back pain with left-sided sciatica    - Resume home Norco PRN  - Monitor       Essential hypertension    - BP well controlled  - Continue home Norvasc and Metoprolol  - Monitor       Chronic obstructive pulmonary disease    - Currently appears compensated  - Continue home meds  - Supplemental O2 PRN  - Monitor       Coronary artery disease involving native coronary artery of native heart without angina pectoris    - Currently asymptomatic  - Continue home ASA in AM  - Tele montoring       Non compliance with medical treatment    - Educated on the importance of adhering to medical treatment plan  - Patient has a limited support system  -  consult for DC planning         VTE Risk Mitigation (From admission, onward)        Ordered     IP VTE HIGH RISK PATIENT  Once      10/31/18 1104     Place sequential compression device  Until discontinued     Start pharmacological AC after surgery complete. 10/31/18 1104             Sheyla Robbins, MARCELINO, ACNP-BC  Department of Hospital Medicine   [facility]

## 2018-10-31 NOTE — HPI
Lavelle Ladd is a pleasant 63 y old  man  , s/p   donor ( brain death ) kidney transplant on 16.  He has CKD stage 3 - GFR 30-59 and his baseline creatinine is between 1.5-1.8. He takes  prednisone 5 mg daily and tacrolimus 1.5 mg twice a day , for maintenance immunosuppression.  He he is also on CellCept 500 mg twice a day, will hold this medication due to cellulitis of his left foot, he is status post left transmetatarsal amputation on 2018, patient was seen for follow-up appointment in the clinic yesterday and notice that his foot was infected, patient was admitted to the hospital for further management,

## 2018-10-31 NOTE — PROGRESS NOTES
"[facility]   PODIATRIC MEDICINE AND SURGERY  PROGRESS NOTE    CC:  Pt is s/p LEFT TMA, Left JOSE R, DPC 9/27/18      SUBJECTIVE   Lavelle Ladd is a 63 y.o. male status post left transmetatarsal amputation secondary to gangrene of multiple digits and JOSE R. Pt underwent  delayed primary wound closure, DOS 9/24/18. Patient presents to clinic today for 2 week follow-up of s/p clinic wound closure 2/2 to wound dehiscence.  Wound cultures at that time was negative for any bacteria.  Patient missed his last postoperative visit as he states he was unable to get a ride to his appointment.  He also admits to ambulating on his amputation site. He drove himself to clinic today as he was unable to get a ride.  He states that he has gotten his foot wet as he has taken showers with garbage bag.  He denies any fever or pain.  There is moderate strike through noted to splint on left lower extremity.  Patient has a history of poor compliance and difficult living dispo as he lives alone.  In the past we did try to get patient admitted to skilled nursing facility.  Patient has completed postoperatively IV cefazolin for 14 days for MSSA gangrene. Pt has been NPO since midnight.      OBJECTIVE   Vitals:    10/31/18 0915   BP: (!) 161/90   Pulse: 64   Temp: 97 °F (36.1 °C)   TempSrc: Oral   Weight: 99.8 kg (220 lb 0.3 oz)   Height: 5' 11" (1.803 m)       Neurovascular status - vascular status grossly intact.   Upon removal of dressing there is moderate strike through to the outer layers, there is malodor, sutures are well coapted and staples to incision sites, there is a mild serosanguineous drainage and central aspect of amputation stump with mild maceration.  Centrally the sutures are removed and wound cultures are taken from the site.  There is localized erythema to amputation stump, there is no ascending lymphangitis; posterior incision site at Achilles tendon is clean dry intact and no acute no acute signs of " infection noted                FINAL PATHOLOGIC DIAGNOSIS  1. Left foot 1st metatarsal:  Benign bone.  Negative for osteomyelitis.  2. Left foot 2nd metatarsal:  Benign bone.  Negative for osteomyelitis.  3. Left forefoot:  Gangrenous necrosis.  Negative for osteomyelitis.  Inflammation extends to the skin and soft tissue resection margin.      Radiographs reviewed:   -  post op.    ASSESSMENT    Encounter Diagnoses   Name Primary?    S/P transmetatarsal amputation of foot, left Yes    Cellulitis of left foot     Abscess of left foot     PVD (peripheral vascular disease)     Gangrene of left foot     MSSA bacteremia          PLAN    Patient has history of poor compliance and has failed outpatient therapy and subsequent wound closure.  He presents today with cellulitis and infection of amputation stump.  He is at high risk of loss of limb in the setting of peripheral vascular disease and diabetic peripheral angiopathy.  He has also failed outpatient IV antibiosis at this point I recommend hospital admission for incision and drainage of amputation site, and possible revisional amputation.  Patient instructed to report to the hospital for direct admission.  I will consult hospital medicine for admission.  Patient is to remain NPO.  He has been NPO since midnight and case is scheduled for 2:30 p.m. this afternoon.    Upon admission to hospital, do not start IV antibiosis. Wound cultures will be taken intra-operatively and post surgery, initiation of broad spectrum antibiosis, MSSA coverage  Will need medical clearance prior to surgery and baseline labs      S/P transmetatarsal amputation of foot, left  -     Aerobic culture  -     Culture, Anaerobic  -     Case Request Operating Room: Incision and Drainage, Left Foot  Possible Revisional Amputation- Transmetatarsal  -     Place in Outpatient; Standing  -     Vital signs; Standing  -     Height and weight; Standing  -     POCT glucose; Standing  -     Verify  surgical site; Standing  -     Verify informed consent; Standing  -     Notify physician ; Standing  -     Pulse Oximetry Q4H; Standing  -     Consult to Anesthesiology; Standing  -     Full code; Standing    Cellulitis of left foot  -     Case Request Operating Room: Incision and Drainage, Left Foot  Possible Revisional Amputation- Transmetatarsal  -     Place in Outpatient; Standing  -     Vital signs; Standing  -     Height and weight; Standing  -     POCT glucose; Standing  -     Verify surgical site; Standing  -     Verify informed consent; Standing  -     Notify physician ; Standing  -     Pulse Oximetry Q4H; Standing  -     Consult to Anesthesiology; Standing  -     Full code; Standing    Abscess of left foot  -     Place in Outpatient; Standing  -     Vital signs; Standing  -     Height and weight; Standing  -     POCT glucose; Standing  -     Verify surgical site; Standing  -     Verify informed consent; Standing  -     Notify physician ; Standing  -     Pulse Oximetry Q4H; Standing  -     Consult to Anesthesiology; Standing  -     Full code; Standing    PVD (peripheral vascular disease)    Gangrene of left foot    MSSA bacteremia    Other orders  -     lidocaine (PF) 10 mg/ml (1%) injection 10 mg  -     ceFAZolin (ANCEF) 2 g in dextrose 5 % 50 mL IVPB      Disclaimer:  This note may have been prepared using voice recognition software, it may have not been extensively proofed, as such there could be errors within the text such as sound alike errors.       Report Electronically Signed By:     Karla Wheeler DPM   Podiatric Medicine & Surgery  [facility]  10/31/2018

## 2018-10-31 NOTE — ASSESSMENT & PLAN NOTE
- A1c 8.0 in September  - Accu checks ACHS with SSI PRN  - Resume home insulin once patient is eating  - ADA diet after surgery  - Monitor

## 2018-10-31 NOTE — INTERVAL H&P NOTE
The patient has been examined and the H&P has been reviewed:    I concur with the findings and no changes have occurred since H&P was written.    Anesthesia/Surgery risks, benefits and alternative options discussed and understood by patient/family.          Active Hospital Problems    Diagnosis  POA    *Abscess of left foot [L02.612]  Unknown    S/P transmetatarsal amputation of foot, left [Z89.432]  Not Applicable    Non compliance with medical treatment [Z91.19]  Not Applicable    Cellulitis of left foot [L03.116]  Yes    Peripheral vascular disease [I73.9]  Yes    Chronic bilateral low back pain with left-sided sciatica [M54.42, G89.29]  Yes     He has long standing back pain. He had lumbar disc disease, moderately severe and disc bulging. This is causing him to have consatant pain in the back and shooting pain in the left leg. He has injections in the back and now in the pain clinic. Constant pain is making him lose quality of life. Neurosurgerical evaluation appreciated. He might need fusion of his vertebra.      Kidney transplant recipient [Z94.0]  Not Applicable    Chronic kidney disease (CKD), stage III (moderate) [N18.3]  Yes     Chronic    Chronic obstructive pulmonary disease [J44.9]  Yes    Coronary artery disease involving native coronary artery of native heart without angina pectoris [I25.10]  Yes    Type 2 diabetes mellitus with hyperglycemia, with long-term current use of insulin [E11.65, Z79.4]  Not Applicable    Essential hypertension [I10]  Yes      Resolved Hospital Problems   No resolved problems to display.

## 2018-10-31 NOTE — HPI
Lavelle Ladd is a 63 y.o. male with a PMHx of COPD, CAD, Diastolic CHF, CKD, Renal transplant, GERD, Hepatitis C, HLD, HTN, PVD, and IDDM who presented to the hospital today as a direct admit for planned surgery today per Dr. Wheeler.  Left foot xray today showed amputation of the distal aspect of the left foot at the level of the metatarsal bones.  No lytic abnormalities to suggest osteomyelitis by plain radiograph.  No evidence of acute fracture or dislocation.  Bony mineralization is normal.  Diffuse soft tissue and dense vascular calcifications.  Large plantar calcaneal spur.  He is s/p left transmetatarsal amputation secondary to gangrene of multiple digits and JOSE R.  Patient underwent delayed primary wound closure, on 9/24/18.  He presented to podiatry today for his 2 week follow-up of wound closure 2/2 to wound dehiscence.  Patient missed his last postoperative visit as he states he was unable to get a ride to his appointment.  He also admits to ambulating on his amputation site.  He drove himself to clinic today as he was unable to get a ride.  He states that he has gotten his foot wet as he has taken showers covering extremity in a garbage bag.  He denies any fever, chills, foot pain, CP, SOB, N/V/D, and all other symptoms at this time.  Patient has a history of poor compliance and difficult living dispo as he lives alone.  In the past we did try to get patient admitted to skilled nursing facility, but he has refused such services.  Wound cx on 9/21 showed MSSA for which he has completed IV cefazolin for 14 days secondary to MSSA gangrene.  He has been NPO since midnight.  He will be admitted to the Medicine unit under the care of [facility].  ID consulted per podiatry recs to guide antibiotic therapy postoperatively.  He is a Full Code.  His SDM is his sister Kecia Williamson who can be reached at [phone].

## 2018-10-31 NOTE — PLAN OF CARE
Problem: Patient Care Overview  Goal: Plan of Care Review  Outcome: Ongoing (interventions implemented as appropriate)  Plan of care reviewed with patient; verbalized understanding. Fall precautions maintained, patient remains free from injury. Pain beng managed appropriately. Medications being administered as ordered. Vital signs stable with no signs of distress noted. Bed low and locked with call light in reach. Will continue monitor.

## 2018-10-31 NOTE — ASSESSMENT & PLAN NOTE
- Educated on the importance of adhering to medical treatment plan  - Patient has a limited support system  -  consult for DC planning

## 2018-10-31 NOTE — ASSESSMENT & PLAN NOTE
- patient is noted to be a poor historian and does not know what medications he takes at home  - per d/w Edmundo Hernandez today, he is currently prescribed Prednisone 5 mg daily, Cellcept 500 mg BID, and Prograf 1.5 mg BID  - Nephrology consulted  - Continue home Prednisone and Prograf for now  - Hold Cellcept per Dr. Staley  - Monitor Prograf levels   - Followed by Dr. Blanco in [place]

## 2018-10-31 NOTE — ANESTHESIA PREPROCEDURE EVALUATION
10/31/2018  Lavelle Ladd is a 63 y.o., male.    Anesthesia Evaluation    I have reviewed the Patient Summary Reports.    I have reviewed the Nursing Notes.   I have reviewed the Medications.     Review of Systems  Anesthesia Hx:  No problems with previous Anesthesia   Denies Personal Hx of Anesthesia complications.   Cardiovascular:   Hypertension CAD   ECG has been reviewed. EF normal  Diastolic dysfunction   Pulmonary:   COPD, mild    Renal/:   Chronic Renal Disease S/p renal transplant 2 years ago   Hepatic/GI:   PUD, GERD, well controlled Liver Disease, Hepatitis, C    Musculoskeletal:   Arthritis     Neurological:   Neuromuscular Disease,    Endocrine:   Diabetes, type 2, using insulin        Physical Exam  General:  Well nourished    Airway/Jaw/Neck:  Airway Findings: Mouth Opening: Normal Tongue: Normal  General Airway Assessment: Adult  Mallampati: II  Improves to II with phonation.  TM Distance: Normal, at least 6 cm  Jaw/Neck Findings:  Neck ROM: Normal ROM       Chest/Lungs:  Chest/Lungs Findings: Clear to auscultation, Normal Respiratory Rate     Heart/Vascular:  Heart Findings: Rate: Normal  Rhythm: Regular Rhythm  Sounds: Normal        Mental Status:  Mental Status Findings:  Cooperative, Alert and Oriented         Anesthesia Plan  Type of Anesthesia, risks & benefits discussed:  Anesthesia Type:  MAC  Patient's Preference:   Intra-op Monitoring Plan: standard ASA monitors  Intra-op Monitoring Plan Comments:   Post Op Pain Control Plan: multimodal analgesia and peripheral nerve block  Post Op Pain Control Plan Comments:   Induction:   IV  Beta Blocker:  Patient is on a Beta-Blocker and has received one dose within the past 24 hours (No further documentation required).       Informed Consent: Patient understands risks and agrees with Anesthesia plan.  Questions answered. Anesthesia consent  signed with patient.  ASA Score: 3     Day of Surgery Review of History & Physical: I have interviewed and examined the patient. I have reviewed the patient's H&P dated:  There are no significant changes.          Ready For Surgery From Anesthesia Perspective.

## 2018-10-31 NOTE — SUBJECTIVE & OBJECTIVE
Past Medical History:   Diagnosis Date    DINORAH (acute kidney injury) 2016    Arthritis     Chronic obstructive pulmonary disease 2016    Coronary artery disease involving native coronary artery of native heart without angina pectoris 2016     donor kidney transplant for DM 16     Induction with Thymo x3 and IV solumedrol to total 875mg  Kidney Biopsy  2016: 16 glomeruli, ACR type 1 AVR type 2, significant microcirculatory changes, c4d negative, No DSA, 5 to10% fibrosis. Treated with thymo x8 2016- no rejection      Diastolic heart failure     Encounter for blood transfusion     ESRD on RRT since 10/2013 10/29/2013    Biopsy proven diabetic nephropathy and lymphoplasmacytic interstitial infiltrate not c/w with AIN (ddx sjogrens or assoc with tamm-horsefall protein extravasation)     GERD (gastroesophageal reflux disease)     History of hepatitis C, s/p successful Rx w/ SVR12 - 2017    Completed 12 weeks harvoni w/ SVR    Hyperlipidemia     Hypertension     PIC line (peripherally inserted central catheter) flush     Prophylactic immunotherapy     Proteinuria     PVD (peripheral vascular disease) 2017    RLE BKA CT 16 Extensive atherosclerotic disease of the aorta and mesenteric arteries.     Renal hypertension     Type 2 diabetes mellitus with diabetic neuropathy, with long-term current use of insulin 2016    Vitamin B12 deficiency        Past Surgical History:   Procedure Laterality Date    AMPUTATION, FOOT, TRANSMETATARSAL Left 2018    Performed by Karla Wheeler DPM at [facility] OR    AORTOGRAPHY WITH SERIALOGRAPHY N/A 2018    Procedure: LEFT LEG ANGIOGRAM;  Surgeon: Donal Mcdonald MD;  Location: [facility] CATH LAB;  Service: Vascular;  Laterality: N/A;    av bovine graft      Left UE    AV FISTULA PLACEMENT      left UE    BIOPSY Tranplanted Kidney N/A 8/15/2016    Performed by Paulette Hanna MD at [facility] OR 2ND FLR    BIOPSY,  LIVER, TRANSJUGULAR APPROACH N/A 4/24/2014    Performed by [facility] at [facility] CATH LAB    CARDIAC CATHETERIZATION  02/2015    CLOSURE OF WOUND Left 9/24/2018    Procedure: CLOSURE, WOUND;  Surgeon: Karla Wheeler DPM;  Location: [facility] OR;  Service: Podiatry;  Laterality: Left;  Secondary Wound closure, extensive    CLOSURE, WOUND Left 9/24/2018    Performed by Karla Wheeler DPM at [facility] OR    FOOT AMPUTATION THROUGH METATARSAL Left 9/21/2018    Procedure: AMPUTATION, FOOT, TRANSMETATARSAL;  Surgeon: Karla Wheeler DPM;  Location: [facility] OR;  Service: Podiatry;  Laterality: Left;    INSERTION, CATHETER, VASCULAR N/A 10/31/2013    Performed by Ralph Mckeon MD at [facility] CATH LAB    IRRIGATION AND DEBRIDEMENT Left 7/9/2018    Performed by Katerina Magaña DPM at [facility] OR    KIDNEY TRANSPLANT  05/21/2016    LEFT LEG ANGIOGRAM N/A 6/14/2018    Performed by Donal Mcdonald MD at [facility] CATH LAB    LEG AMPUTATION THROUGH KNEE  2011    right LE, started as nail puncture leading to diabetic ulcer    LENGTHENING, TENDON, ACHILLES Left 9/24/2018    Performed by Karla Wheeler DPM at [facility] OR    TRANSPLANT-KIDNEY Right 5/21/2016    Performed by Ronny Bergeron MD at [facility] OR [address] [place] [address]       Review of patient's allergies indicates:  No Known Allergies  Current Facility-Administered Medications   Medication Frequency    amLODIPine tablet 2.5 mg Daily    arformoterol nebulizer solution 15 mcg Q12H    [START ON 11/1/2018] aspirin EC tablet 81 mg Daily    budesonide nebulizer solution 0.5 mg Q12H    dextrose 50% injection 12.5 g PRN    dextrose 50% injection 25 g PRN    gabapentin capsule 300 mg TID    glucagon (human recombinant) injection 1 mg PRN    glucose chewable tablet 16 g PRN    glucose chewable tablet 24 g PRN    hydrALAZINE injection 5 mg Q10 Min PRN    HYDROcodone-acetaminophen  mg per tablet 1 tablet Q4H PRN    insulin aspart U-100 pen 0-5 Units QID (AC + HS) PRN     metoprolol tartrate (LOPRESSOR) tablet 25 mg BID    nozaseptin (NOZIN) nasal  BID    ondansetron disintegrating tablet 8 mg Q8H PRN    ondansetron injection 4 mg Q8H PRN    piperacillin-tazobactam 4.5 g in dextrose 5 % 100 mL IVPB (ready to mix system) Q8H    predniSONE tablet 5 mg Daily    sodium bicarbonate tablet 2,600 mg BID    sodium chloride 0.9% flush 5 mL PRN    tacrolimus capsule 1.5 mg BID     Family History     Problem Relation (Age of Onset)    Cancer Father    Diabetes Father    Heart failure Father, Mother    Stroke Father        Tobacco Use    Smoking status: Former Smoker     Packs/day: 1.00     Years: 40.00     Pack years: 40.00     Last attempt to quit: 2013     Years since quittin.8    Smokeless tobacco: Never Used   Substance and Sexual Activity    Alcohol use: Yes     Alcohol/week: 3.6 oz     Types: 6 Cans of beer per week     Comment: 6 beers/week    Drug use: No    Sexual activity: No     Review of Systems   Constitutional: Positive for activity change and fatigue. Negative for appetite change.   HENT: Negative for congestion and facial swelling.    Eyes: Negative for pain, discharge and redness.   Respiratory: Negative for apnea, cough and chest tightness.    Cardiovascular: Negative for chest pain, palpitations and leg swelling.   Gastrointestinal: Negative for abdominal distention.   Genitourinary: Negative for difficulty urinating, dysuria and frequency.   Musculoskeletal: Positive for arthralgias. Negative for neck pain and neck stiffness.        Left foot discomfort    Skin: Negative for color change, rash and wound.   Neurological: Negative for dizziness, weakness and numbness.   Psychiatric/Behavioral: Negative for sleep disturbance.   All other systems reviewed and are negative.    Objective:     Vital Signs (Most Recent):  Temp: 98 °F (36.7 °C) (10/31/18 1049)  Pulse: 62 (10/31/18 1049)  Resp: 16 (10/31/18 1049)  BP: 139/82 (10/31/18 1049)  SpO2: 96 %  (10/31/18 1049)  O2 Device (Oxygen Therapy): room air (10/31/18 1049) Vital Signs (24h Range):  Temp:  [97 °F (36.1 °C)-98 °F (36.7 °C)] 98 °F (36.7 °C)  Pulse:  [62-64] 62  Resp:  [16] 16  SpO2:  [96 %] 96 %  BP: (139-161)/(82-90) 139/82     Weight: 99.8 kg (220 lb 0.3 oz) (10/31/18 1049)  Body mass index is 30.69 kg/m².  Body surface area is 2.24 meters squared.    No intake/output data recorded.    Physical Exam   Constitutional: He is oriented to person, place, and time. He appears well-developed and well-nourished. No distress.   HENT:   Head: Normocephalic and atraumatic.   Eyes: Pupils are equal, round, and reactive to light.   Neck: Normal range of motion. Neck supple. No tracheal deviation present. No thyromegaly present.   Cardiovascular: Normal rate, regular rhythm, normal heart sounds and intact distal pulses. Exam reveals no gallop and no friction rub.   No murmur heard.  Pulmonary/Chest: Effort normal and breath sounds normal. He has no wheezes. He has no rales.   Abdominal: Soft. He exhibits no mass. There is no tenderness. There is no rebound and no guarding.   No allograft tenderness    Musculoskeletal: Normal range of motion. He exhibits no edema.   S/p right BKS, Left foot in dressings, s/p left transmetatarsal amputation   Lymphadenopathy:     He has no cervical adenopathy.   Neurological: He is alert and oriented to person, place, and time.   Skin: Skin is warm. No rash noted. He is not diaphoretic. No erythema.   Nursing note and vitals reviewed.      Significant Labs:  CBC:   Recent Labs   Lab 10/31/18  1054   WBC 7.39   RBC 4.76   HGB 13.9*   HCT 43.2      MCV 91   MCH 29.2   MCHC 32.2     CMP:   Recent Labs   Lab 10/31/18  1053   *   CALCIUM 9.5   ALBUMIN 3.3*   PROT 7.2      K 4.8   CO2 23      BUN 20   CREATININE 1.5*   ALKPHOS 86   ALT 12   AST 20   BILITOT 0.6     Coagulation:   Recent Labs   Lab 10/31/18  1126   INR 1.0     LFTs:   Recent Labs   Lab  10/31/18  1053   ALT 12   AST 20   ALKPHOS 86   BILITOT 0.6   PROT 7.2   ALBUMIN 3.3*     All labs within the past 24 hours have been reviewed.    Significant Imaging:  Reviewed     Lab Results   Component Value Date    .0 (H) 10/30/2017    CALCIUM 9.5 10/31/2018    CAION 1.10 05/30/2016    PHOS 2.9 10/31/2018

## 2018-10-31 NOTE — ANESTHESIA RELEASE NOTE
"Anesthesia Release from PACU Note    Patient: Lavelle Ladd    Procedure(s) Performed: Procedure(s) (LRB):  Incision and Drainage, Left Foot Possible Revisional Amputation- Transmetatarsal (Left)    Anesthesia type: MAC    Post pain: Adequate analgesia    Post assessment: no apparent anesthetic complications    Last Vitals:   Visit Vitals  BP (!) 176/91 (BP Location: Left arm, Patient Position: Lying)   Pulse 65   Temp 36.6 °C (97.8 °F) (Temporal)   Resp (!) 21   Ht 5' 11" (1.803 m)   Wt 99.8 kg (220 lb 0.3 oz)   SpO2 96%   BMI 30.69 kg/m²       Post vital signs: stable    Level of consciousness: awake    Nausea/Vomiting: no nausea/no vomiting    Complications: none    Airway Patency: patent    Respiratory: unassisted    Cardiovascular: stable and blood pressure at baseline    Hydration: euvolemic  "

## 2018-10-31 NOTE — SUBJECTIVE & OBJECTIVE
Past Medical History:   Diagnosis Date    DINORAH (acute kidney injury) 2016    Arthritis     Chronic obstructive pulmonary disease 2016    Coronary artery disease involving native coronary artery of native heart without angina pectoris 2016     donor kidney transplant for DM 16     Induction with Thymo x3 and IV solumedrol to total 875mg  Kidney Biopsy  2016: 16 glomeruli, ACR type 1 AVR type 2, significant microcirculatory changes, c4d negative, No DSA, 5 to10% fibrosis. Treated with thymo x8 2016- no rejection      Diastolic heart failure     Encounter for blood transfusion     ESRD on RRT since 10/2013 10/29/2013    Biopsy proven diabetic nephropathy and lymphoplasmacytic interstitial infiltrate not c/w with AIN (ddx sjogrens or assoc with tamm-horsefall protein extravasation)     GERD (gastroesophageal reflux disease)     History of hepatitis C, s/p successful Rx w/ SVR12 - 2017    Completed 12 weeks harvoni w/ SVR    Hyperlipidemia     Hypertension     PIC line (peripherally inserted central catheter) flush     Prophylactic immunotherapy     Proteinuria     PVD (peripheral vascular disease) 2017    RLE BKA CT 16 Extensive atherosclerotic disease of the aorta and mesenteric arteries.     Renal hypertension     Type 2 diabetes mellitus with diabetic neuropathy, with long-term current use of insulin 2016    Vitamin B12 deficiency        Past Surgical History:   Procedure Laterality Date    AMPUTATION, FOOT, TRANSMETATARSAL Left 2018    Performed by Karla Wheeler DPM at [facility] OR    AORTOGRAPHY WITH SERIALOGRAPHY N/A 2018    Procedure: LEFT LEG ANGIOGRAM;  Surgeon: Donal Mcdonald MD;  Location: [facility] CATH LAB;  Service: Vascular;  Laterality: N/A;    av bovine graft      Left UE    AV FISTULA PLACEMENT      left UE    BIOPSY Tranplanted Kidney N/A 8/15/2016    Performed by Paulette Hanna MD at [facility] OR 2ND FLR    BIOPSY,  LIVER, TRANSJUGULAR APPROACH N/A 4/24/2014    Performed by [facility] at [facility] CATH LAB    CARDIAC CATHETERIZATION  02/2015    CLOSURE OF WOUND Left 9/24/2018    Procedure: CLOSURE, WOUND;  Surgeon: Karla Wheeler DPM;  Location: [facility] OR;  Service: Podiatry;  Laterality: Left;  Secondary Wound closure, extensive    CLOSURE, WOUND Left 9/24/2018    Performed by Karla Wheeler DPM at [facility] OR    FOOT AMPUTATION THROUGH METATARSAL Left 9/21/2018    Procedure: AMPUTATION, FOOT, TRANSMETATARSAL;  Surgeon: Karla Wheeler DPM;  Location: [facility] OR;  Service: Podiatry;  Laterality: Left;    INSERTION, CATHETER, VASCULAR N/A 10/31/2013    Performed by Ralph Mckeon MD at [facility] CATH LAB    IRRIGATION AND DEBRIDEMENT Left 7/9/2018    Performed by Katerina Magaña DPM at [facility] OR    KIDNEY TRANSPLANT  05/21/2016    LEFT LEG ANGIOGRAM N/A 6/14/2018    Performed by Donal Mcdonald MD at [facility] CATH LAB    LEG AMPUTATION THROUGH KNEE  2011    right LE, started as nail puncture leading to diabetic ulcer    LENGTHENING, TENDON, ACHILLES Left 9/24/2018    Performed by Karla Wheeler DPM at [facility] OR    TRANSPLANT-KIDNEY Right 5/21/2016    Performed by Ronny Bergeron MD at [facility] OR [address] [place] [address]       Review of patient's allergies indicates:  No Known Allergies    No current facility-administered medications on file prior to encounter.      Current Outpatient Medications on File Prior to Encounter   Medication Sig    amLODIPine (NORVASC) 2.5 MG tablet Take 1 tablet (2.5 mg total) by mouth once daily.    gabapentin (NEURONTIN) 300 MG capsule Take 300 mg by mouth 3 (three) times daily.     insulin NPH (NOVOLIN N) 100 unit/mL injection Take 25 units subq with breakfast and 15 units at bedtime    mycophenolate (CELLCEPT) 500 mg Tab Take 500 mg by mouth 2 (two) times daily.    predniSONE (DELTASONE) 5 MG tablet Take 1 tablet (5 mg total) by mouth once daily.    sodium bicarbonate 650 MG tablet Take 4  "tablets (2,600 mg total) by mouth 2 (two) times daily.    tacrolimus (PROGRAF) 0.5 MG Cap Take 3 capsules (1.5 mg total) by mouth every 12 (twelve) hours. Z94.0 Kidney Transplant    aspirin (ECOTRIN) 81 MG EC tablet Take 1 tablet (81 mg total) by mouth once daily.    BD INSULIN PEN NEEDLE UF SHORT 31 gauge x 5/16" Ndle     BD INSULIN SYRINGE ULTRA-FINE 1 mL 31 gauge x 5/16 Syrg USE ONE AS DIRECTED FOUR TIMES DAILY WITH MEALS AND AT BEDTIME    blood sugar diagnostic Strp 1 each by Misc.(Non-Drug; Combo Route) route 3 (three) times daily.    blood-glucose meter kit Use as instructed    budesonide-formoterol 160-4.5 mcg (SYMBICORT) 160-4.5 mcg/actuation HFAA Inhale 2 puffs into the lungs every 12 (twelve) hours. Wash out mouth after using    ergocalciferol (ERGOCALCIFEROL) 50,000 unit Cap Take 1 capsule (50,000 Units total) by mouth every 7 days. Take on Mondays    HYDROcodone-acetaminophen (NORCO)  mg per tablet Take 1 tablet by mouth every 4 (four) hours as needed.    inhalation device (BREATHERITE VALVED MDI CHAMBER) Use as directed for inhalation.    lancets Misc 1 each by Misc.(Non-Drug; Combo Route) route 3 (three) times daily.    metoprolol tartrate (LOPRESSOR) 25 MG tablet Take 1 tablet (25 mg total) by mouth 2 (two) times daily.    NOVOLIN R REGULAR U-100 INSULN 100 unit/mL injection INJECT 6 UNITS WITH BREAKFAST AND 8 UNITS WITH DINNER, SUBCUTANEOUSLY 30 MIN BEFORE MEAL.    nozaseptin (NOZIN) nasal  1 each by Each Nare route 2 (two) times daily.    ondansetron (ZOFRAN-ODT) 4 MG TbDL Take 1 tablet (4 mg total) by mouth every 8 (eight) hours as needed.    pen needle, diabetic (EASY COMFORT PEN NEEDLES) 32 gauge x 5/32" Ndle Inject 1 each into the skin 3 (three) times daily.     Family History     Problem Relation (Age of Onset)    Cancer Father    Diabetes Father    Heart failure Father, Mother    Stroke Father        Tobacco Use    Smoking status: Former Smoker     Packs/day: " 1.00     Years: 40.00     Pack years: 40.00     Last attempt to quit: 2013     Years since quittin.8    Smokeless tobacco: Never Used   Substance and Sexual Activity    Alcohol use: Yes     Alcohol/week: 3.6 oz     Types: 6 Cans of beer per week     Comment: 6 beers/week    Drug use: No    Sexual activity: No     Review of Systems   Constitutional: Negative for chills, diaphoresis, fatigue and fever.   HENT: Negative for hearing loss, mouth sores, sore throat, tinnitus and trouble swallowing.    Eyes: Negative for pain, discharge and redness.   Respiratory: Negative for apnea, cough, choking, chest tightness, shortness of breath, wheezing and stridor.    Cardiovascular: Negative for chest pain, palpitations and leg swelling.   Gastrointestinal: Negative for abdominal distention, abdominal pain, blood in stool, constipation, diarrhea, nausea, rectal pain and vomiting.   Endocrine: Negative for cold intolerance, heat intolerance, polydipsia, polyphagia and polyuria.   Genitourinary: Negative for difficulty urinating, dysuria, flank pain, frequency, hematuria and urgency.   Musculoskeletal: Negative for arthralgias, back pain, gait problem, joint swelling, neck pain and neck stiffness.   Skin: Positive for color change and wound. Negative for rash.        Left foot with worsening erythema.    Allergic/Immunologic: Negative for food allergies.   Neurological: Negative for dizziness, tremors, seizures, syncope, speech difficulty, light-headedness and headaches.   Hematological: Negative for adenopathy. Does not bruise/bleed easily.   Psychiatric/Behavioral: Negative for agitation and confusion. The patient is not nervous/anxious.    All other systems reviewed and are negative.    Objective:     Vital Signs (Most Recent):  Temp: 98 °F (36.7 °C) (10/31/18 104)  Pulse: 62 (10/31/18 104)  Resp: 16 (10/31/18 104)  BP: 139/82 (10/31/18 1049)  SpO2: 96 % (10/31/18 1049) Vital Signs (24h Range):  Temp:  [97 °F  (36.1 °C)-98 °F (36.7 °C)] 98 °F (36.7 °C)  Pulse:  [62-64] 62  Resp:  [16] 16  SpO2:  [96 %] 96 %  BP: (139-161)/(82-90) 139/82     Weight: 99.8 kg (220 lb 0.3 oz)  Body mass index is 30.69 kg/m².    Physical Exam   Constitutional: He is oriented to person, place, and time. He appears well-developed and well-nourished. No distress.   HENT:   Head: Normocephalic and atraumatic.   Mouth/Throat: Oropharynx is clear and moist.   Eyes: Conjunctivae and EOM are normal. Pupils are equal, round, and reactive to light.   Neck: Normal range of motion. Neck supple. No JVD present.   Cardiovascular: Normal rate, regular rhythm, normal heart sounds and intact distal pulses. Exam reveals no gallop and no friction rub.   No murmur heard.  Pulmonary/Chest: Effort normal and breath sounds normal. No stridor. No respiratory distress. He has no wheezes. He has no rales. He exhibits no tenderness.   Abdominal: Soft. Bowel sounds are normal. He exhibits no distension and no mass. There is no tenderness. There is no rebound and no guarding.   Musculoskeletal: Normal range of motion. He exhibits no edema or tenderness.   Neurological: He is alert and oriented to person, place, and time. No cranial nerve deficit.   Skin: Skin is warm and dry. No rash noted. He is not diaphoretic. There is erythema.   LLE dressing CDI.  Pictures in clinic today show + erythema with some sutures still intact.  + malodor per podiatry (see pictures)   Psychiatric: He has a normal mood and affect. His speech is normal. Judgment and thought content normal. He is withdrawn. Cognition and memory are normal. He is inattentive.   Nursing note and vitals reviewed.        CRANIAL NERVES     CN III, IV, VI   Pupils are equal, round, and reactive to light.  Extraocular motions are normal.                Significant Labs:   CBC:   Recent Labs   Lab 10/31/18  1054   WBC 7.39   HGB 13.9*   HCT 43.2        CMP:   Recent Labs   Lab 10/31/18  1053      K 4.8       CO2 23   *   BUN 20   CREATININE 1.5*   CALCIUM 9.5   PROT 7.2   ALBUMIN 3.3*   BILITOT 0.6   ALKPHOS 86   AST 20   ALT 12   ANIONGAP 12   EGFRNONAA 48*     Coagulation:   Recent Labs   Lab 10/31/18  1126   INR 1.0     Lactic Acid:   Recent Labs   Lab 10/31/18  1121   LACTATE 2.0       Significant Imaging: I have reviewed all pertinent imaging results/findings within the past 24 hours.

## 2018-11-01 PROBLEM — Z89.511 HX OF RIGHT BKA: Status: ACTIVE | Noted: 2018-11-01

## 2018-11-01 LAB
ANION GAP SERPL CALC-SCNC: 9 MMOL/L
BASOPHILS # BLD AUTO: 0.02 K/UL
BASOPHILS NFR BLD: 0.2 %
BUN SERPL-MCNC: 24 MG/DL
CALCIUM SERPL-MCNC: 8.8 MG/DL
CHLORIDE SERPL-SCNC: 100 MMOL/L
CO2 SERPL-SCNC: 24 MMOL/L
CREAT SERPL-MCNC: 1.6 MG/DL
DIFFERENTIAL METHOD: ABNORMAL
EOSINOPHIL # BLD AUTO: 0.1 K/UL
EOSINOPHIL NFR BLD: 0.4 %
ERYTHROCYTE [DISTWIDTH] IN BLOOD BY AUTOMATED COUNT: 16.6 %
EST. GFR  (AFRICAN AMERICAN): 51 ML/MIN/1.73 M^2
EST. GFR  (NON AFRICAN AMERICAN): 45 ML/MIN/1.73 M^2
GLUCOSE SERPL-MCNC: 193 MG/DL
HCT VFR BLD AUTO: 39.6 %
HGB BLD-MCNC: 12.4 G/DL
LYMPHOCYTES # BLD AUTO: 2.2 K/UL
LYMPHOCYTES NFR BLD: 18.9 %
MCH RBC QN AUTO: 28.5 PG
MCHC RBC AUTO-ENTMCNC: 31.3 G/DL
MCV RBC AUTO: 91 FL
MONOCYTES # BLD AUTO: 0.9 K/UL
MONOCYTES NFR BLD: 7.3 %
NEUTROPHILS # BLD AUTO: 8.5 K/UL
NEUTROPHILS NFR BLD: 73.2 %
PLATELET # BLD AUTO: 182 K/UL
PMV BLD AUTO: 9.9 FL
POCT GLUCOSE: 343 MG/DL (ref 70–110)
POTASSIUM SERPL-SCNC: 4.5 MMOL/L
RBC # BLD AUTO: 4.35 M/UL
SODIUM SERPL-SCNC: 133 MMOL/L
TACROLIMUS BLD-MCNC: 4.7 NG/ML
WBC # BLD AUTO: 11.65 K/UL

## 2018-11-01 PROCEDURE — 80197 ASSAY OF TACROLIMUS: CPT

## 2018-11-01 PROCEDURE — 25000003 PHARM REV CODE 250: Performed by: PODIATRIST

## 2018-11-01 PROCEDURE — 85025 COMPLETE CBC W/AUTO DIFF WBC: CPT

## 2018-11-01 PROCEDURE — 63600175 PHARM REV CODE 636 W HCPCS: Performed by: NURSE PRACTITIONER

## 2018-11-01 PROCEDURE — 25000003 PHARM REV CODE 250: Performed by: NURSE PRACTITIONER

## 2018-11-01 PROCEDURE — 80048 BASIC METABOLIC PNL TOTAL CA: CPT

## 2018-11-01 PROCEDURE — 25000242 PHARM REV CODE 250 ALT 637 W/ HCPCS: Performed by: PODIATRIST

## 2018-11-01 PROCEDURE — 94640 AIRWAY INHALATION TREATMENT: CPT

## 2018-11-01 PROCEDURE — 63600175 PHARM REV CODE 636 W HCPCS: Performed by: PODIATRIST

## 2018-11-01 PROCEDURE — 99233 SBSQ HOSP IP/OBS HIGH 50: CPT | Mod: ,,, | Performed by: INTERNAL MEDICINE

## 2018-11-01 PROCEDURE — 36415 COLL VENOUS BLD VENIPUNCTURE: CPT

## 2018-11-01 PROCEDURE — 21400001 HC TELEMETRY ROOM

## 2018-11-01 PROCEDURE — 94760 N-INVAS EAR/PLS OXIMETRY 1: CPT

## 2018-11-01 PROCEDURE — 99900035 HC TECH TIME PER 15 MIN (STAT)

## 2018-11-01 RX ADMIN — ASPIRIN 81 MG: 81 TABLET, COATED ORAL at 09:11

## 2018-11-01 RX ADMIN — GABAPENTIN 300 MG: 300 CAPSULE ORAL at 03:11

## 2018-11-01 RX ADMIN — HYDRALAZINE HYDROCHLORIDE 25 MG: 25 TABLET, FILM COATED ORAL at 12:11

## 2018-11-01 RX ADMIN — BUDESONIDE 0.5 MG: 0.5 SUSPENSION RESPIRATORY (INHALATION) at 07:11

## 2018-11-01 RX ADMIN — TACROLIMUS 1.5 MG: 0.5 CAPSULE ORAL at 07:11

## 2018-11-01 RX ADMIN — ARFORMOTEROL TARTRATE 15 MCG: 15 SOLUTION RESPIRATORY (INHALATION) at 09:11

## 2018-11-01 RX ADMIN — GABAPENTIN 300 MG: 300 CAPSULE ORAL at 09:11

## 2018-11-01 RX ADMIN — INSULIN ASPART 2 UNITS: 100 INJECTION, SOLUTION INTRAVENOUS; SUBCUTANEOUS at 09:11

## 2018-11-01 RX ADMIN — METOPROLOL TARTRATE 25 MG: 25 TABLET ORAL at 09:11

## 2018-11-01 RX ADMIN — HYDROCODONE BITARTRATE AND ACETAMINOPHEN 1 TABLET: 10; 325 TABLET ORAL at 08:11

## 2018-11-01 RX ADMIN — PIPERACILLIN SODIUM AND TAZOBACTAM SODIUM 4.5 G: 4; .5 INJECTION, POWDER, LYOPHILIZED, FOR SOLUTION INTRAVENOUS at 06:11

## 2018-11-01 RX ADMIN — ARFORMOTEROL TARTRATE 15 MCG: 15 SOLUTION RESPIRATORY (INHALATION) at 07:11

## 2018-11-01 RX ADMIN — AMLODIPINE BESYLATE 2.5 MG: 2.5 TABLET ORAL at 09:11

## 2018-11-01 RX ADMIN — HYDROCODONE BITARTRATE AND ACETAMINOPHEN 1 TABLET: 10; 325 TABLET ORAL at 10:11

## 2018-11-01 RX ADMIN — PREDNISONE 5 MG: 5 TABLET ORAL at 09:11

## 2018-11-01 RX ADMIN — SODIUM BICARBONATE 650 MG TABLET 2600 MG: at 09:11

## 2018-11-01 RX ADMIN — PIPERACILLIN SODIUM AND TAZOBACTAM SODIUM 4.5 G: 4; .5 INJECTION, POWDER, LYOPHILIZED, FOR SOLUTION INTRAVENOUS at 03:11

## 2018-11-01 RX ADMIN — HYDROCODONE BITARTRATE AND ACETAMINOPHEN 1 TABLET: 10; 325 TABLET ORAL at 06:11

## 2018-11-01 RX ADMIN — VANCOMYCIN HYDROCHLORIDE 1250 MG: 10 INJECTION, POWDER, LYOPHILIZED, FOR SOLUTION INTRAVENOUS at 09:11

## 2018-11-01 RX ADMIN — BUDESONIDE 0.5 MG: 0.5 SUSPENSION RESPIRATORY (INHALATION) at 09:11

## 2018-11-01 RX ADMIN — HYDROCODONE BITARTRATE AND ACETAMINOPHEN 1 TABLET: 10; 325 TABLET ORAL at 03:11

## 2018-11-01 NOTE — HPI
63 year old  male  with a PMHx of COPD, CAD, Diastolic CHF, CKD, Renal transplant, GERD, Hepatitis C, HLD, HTN, PVD, and IDDM who presented to the hospital as a direct admit for planned surgery today per Dr. Wheeler.   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  Patient underwent delayed primary wound closure, on 9/24/18.  He presented to podiatry today for his 2 week follow-up of wound closure 2/2 to wound dehiscence.    All previous cultures reviewed -  09/21-    Wound cx on 9/21 showed MSSA  07/2018-  Newer results are available. Click to view them now.   Component 3mo ago   Aerobic Bacterial Culture CITROBACTER FREUNDII   Rare       Aerobic Bacterial Culture KLEBSIELLA OXYTOCA   Rare           He had on 10/31-   1. Left foot Irrigation and debridement to bone  2. Revisional Transmetatarsal Amputation, Left foot

## 2018-11-01 NOTE — SUBJECTIVE & OBJECTIVE
Interval History: The patient is S/P I&D of left foot yesterday. No acute issues overnight. The patient reports that his pain is well controlled. Continue IV ABX, cultures pending. Infectious disease is following.    Review of Systems   Constitutional: Negative for chills, diaphoresis, fatigue and fever.   HENT: Negative for hearing loss, mouth sores, sore throat, tinnitus and trouble swallowing.    Eyes: Negative for pain, discharge and redness.   Respiratory: Negative for apnea, cough, choking, chest tightness, shortness of breath, wheezing and stridor.    Cardiovascular: Negative for chest pain, palpitations and leg swelling.   Gastrointestinal: Negative for abdominal distention, abdominal pain, blood in stool, constipation, diarrhea, nausea, rectal pain and vomiting.   Endocrine: Negative for cold intolerance, heat intolerance, polydipsia, polyphagia and polyuria.   Genitourinary: Negative for difficulty urinating, dysuria, flank pain, frequency, hematuria and urgency.   Musculoskeletal: Negative for arthralgias, back pain, gait problem, joint swelling, neck pain and neck stiffness.   Skin: Positive for color change and wound. Negative for rash.        Left foot with worsening erythema.    Allergic/Immunologic: Negative for food allergies.   Neurological: Negative for dizziness, tremors, seizures, syncope, speech difficulty, light-headedness and headaches.   Hematological: Negative for adenopathy. Does not bruise/bleed easily.   Psychiatric/Behavioral: Negative for agitation and confusion. The patient is not nervous/anxious.    All other systems reviewed and are negative.    Objective:     Vital Signs (Most Recent):  Temp: 96.6 °F (35.9 °C) (11/01/18 0949)  Pulse: 70 (11/01/18 0949)  Resp: 18 (11/01/18 0949)  BP: (!) 122/58 (11/01/18 0949)  SpO2: (!) 92 % (11/01/18 0949) Vital Signs (24h Range):  Temp:  [96.6 °F (35.9 °C)-99.8 °F (37.7 °C)] 96.6 °F (35.9 °C)  Pulse:  [62-80] 70  Resp:  [10-23] 18  SpO2:  [91  %-99 %] 92 %  BP: (107-208)/(52-99) 122/58     Weight: 99.8 kg (220 lb 0.3 oz)  Body mass index is 30.69 kg/m².    Intake/Output Summary (Last 24 hours) at 11/1/2018 1334  Last data filed at 11/1/2018 0606  Gross per 24 hour   Intake 600 ml   Output --   Net 600 ml      Physical Exam   Constitutional: He is oriented to person, place, and time. He appears well-developed and well-nourished. No distress.   HENT:   Head: Normocephalic and atraumatic.   Mouth/Throat: Oropharynx is clear and moist.   Eyes: Conjunctivae and EOM are normal. Pupils are equal, round, and reactive to light.   Neck: Normal range of motion. Neck supple. No JVD present.   Cardiovascular: Normal rate, regular rhythm, normal heart sounds and intact distal pulses. Exam reveals no gallop and no friction rub.   No murmur heard.  Pulmonary/Chest: Effort normal and breath sounds normal. No stridor. No respiratory distress. He has no wheezes. He has no rales. He exhibits no tenderness.   Abdominal: Soft. Bowel sounds are normal. He exhibits no distension and no mass. There is no tenderness. There is no rebound and no guarding.   Musculoskeletal: Normal range of motion. He exhibits no edema or tenderness.   Neurological: He is alert and oriented to person, place, and time. No cranial nerve deficit.   Skin: Skin is warm and dry. No rash noted. He is not diaphoretic. There is erythema.   LLE dressing CDI.  Pictures in clinic today show + erythema with some sutures still intact.  + malodor per podiatry (see pictures)   Psychiatric: He has a normal mood and affect. His speech is normal. Judgment and thought content normal. He is withdrawn. Cognition and memory are normal. He is inattentive.   Nursing note and vitals reviewed.      Significant Labs: All pertinent labs within the past 24 hours have been reviewed.    Significant Imaging:

## 2018-11-01 NOTE — PROGRESS NOTES
BP elevated. Denies any headache, pain, n/v, and numbness or tingling in extremities. No PRN orders. Informed Ryan Rae NP. New orders noted. Will continue to monitor.

## 2018-11-01 NOTE — ASSESSMENT & PLAN NOTE
10/31- will continue close podiatry follow up , will use cultures to guide therapy   All previous cultures reviewed-  Newer results are available. Click to view them now.   Component 3mo ago   Aerobic Bacterial Culture CITROBACTER FREUNDII   Rare       Aerobic Bacterial Culture KLEBSIELLA OXYTOCA   Rare         09/2018- MSSA  Will continue Zosyn/Vanco for now.  Vancomycin might be stopped soon

## 2018-11-01 NOTE — NURSING
Chart reviewed for diabetes  Patient has been seen by this nurse on recent admit  See note on 6-14-18

## 2018-11-01 NOTE — PLAN OF CARE
CM met with patient at the bedside to assess for discharge needs.  Patient states that she lives at home alone and is independent.  He states that he was sent to the hospital from his physicians office for surgery.  He said he has had home health/[facility] recently but services ended about a week ago.  He is unsure of what needs he will have at this time.  CM will continue to follow.  CM provided a transitional care folder, information on advanced directives, information on pharmacy bedside delivery, and discharge planning begins on admission with contact information for any needs/questions.    [id number] - [place], [place] - [address]  [address]  [place] [address]  Phone: [phone] Fax: [phone]    [facility] [address]  [place] [address]  Phone: [phone] Fax: [phone]    [address] [place], [place] - [address]  [address]  [place] [address]  Phone: [phone] Fax: [phone]    [facility]  [address] [facility]  [place] [address]  Phone: [phone] Fax: [phone]    [id number] - [place], [place] - [address]  [address]  [place] [address]  Phone: [phone] Fax: [phone]    Rishabh Esteban MD  Payor: [employer] MANAGED MEDICARE / Plan: [employer] TOTAL CARE ADVANTAGE / Product Type: Medicare Advantage /     D/C Plan:  Home vs Home Health     11/01/18 1014   Discharge Assessment   Assessment Type Discharge Planning Assessment   Confirmed/corrected address and phone number on facesheet? Yes   Assessment information obtained from? Medical Record   Expected Length of Stay (days) (TBD)   Communicated expected length of stay with patient/caregiver yes   Prior to hospitilization cognitive status: Alert/Oriented   Prior to hospitalization functional status: Independent;Assistive Equipment   Current cognitive status: Alert/Oriented   Current Functional Status:  Independent;Assistive Equipment   Facility Arrived From: MD office   Lives With alone   Able to Return to Prior Arrangements yes   Is patient able to care for self after discharge? Yes   Who are your caregiver(s) and their phone number(s)? Kecia Sagastume, sister [phone]   Patient's perception of discharge disposition home or selfcare   Patient currently being followed by outpatient case management? No   Patient currently receives any other outside agency services? No   Equipment Currently Used at Home cane, straight;walker, rolling;glucometer;wheelchair   Do you have any problems affording any of your prescribed medications? No   Is the patient taking medications as prescribed? yes   Does the patient have transportation home? Yes   Transportation Available car   Dialysis Name and Scheduled days NA   Does the patient receive services at the [facility]? No   Discharge Plan A Home   Discharge Plan B Home;Home Health   Patient/Family In Agreement With Plan yes

## 2018-11-01 NOTE — CONSULTS
[facility]  Infectious Disease  Consult Note    Patient Name: Lavelle Ladd  MRN: [id number]  Admission Date: 10/31/2018  Hospital Length of Stay: 1 days  Attending Physician: Karla Wheeler DPM  Primary Care Provider: Rishabh Esteban MD     Isolation Status: No active isolations    Patient information was obtained from patient, past medical records and ER records.      Consults  Assessment/Plan:     * Abscess of left foot    10/31- will continue close podiatry follow up , will use cultures to guide therapy   All previous cultures reviewed-  Newer results are available. Click to view them now.   Component 3mo ago   Aerobic Bacterial Culture CITROBACTER FREUNDII   Rare       Aerobic Bacterial Culture KLEBSIELLA OXYTOCA   Rare         09/2018- MSSA  Will continue Zosyn/Vanco for now.  Vancomycin might be stopped soon     S/P transmetatarsal amputation of foot, left    10/31- podiatry follow up , continue close follow up      Kidney transplant recipient    10/31- nephrology follow up      Chronic kidney disease (CKD), stage III (moderate)    10/31- will continue close monitoring , avoid nephrotoxic meds.       Type 2 diabetes mellitus with hyperglycemia, with long-term current use of insulin    10/31- will continue close glucose control.  Insulin therapy          Thank you for your consult. I will follow-up with patient. Please contact us if you have any additional questions.    Ronny Veliz MD  Infectious Disease  [facility]    Subjective:     Principal Problem: Abscess of left foot    HPI:      63 year old  male  with a PMHx of COPD, CAD, Diastolic CHF, CKD, Renal transplant, GERD, Hepatitis C, HLD, HTN, PVD, and IDDM who presented to the hospital as a direct admit for planned surgery today per Dr. Wheeler.   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  Patient underwent delayed primary wound closure, on 9/24/18.  He presented  to podiatry today for his 2 week follow-up of wound closure  to wound dehiscence.    All previous cultures reviewed -  -    Wound cx on  showed MSSA  2018-  Newer results are available. Click to view them now.   Component 3mo ago   Aerobic Bacterial Culture CITROBACTER FREUNDII   Rare       Aerobic Bacterial Culture KLEBSIELLA OXYTOCA   Rare           He had on 10/31-   1. Left foot Irrigation and debridement to bone  2. Revisional Transmetatarsal Amputation, Left foot          Past Medical History:   Diagnosis Date    DINORAH (acute kidney injury) 2016    Arthritis     Chronic obstructive pulmonary disease 2016    Coronary artery disease involving native coronary artery of native heart without angina pectoris 2016     donor kidney transplant for DM 16     Induction with Thymo x3 and IV solumedrol to total 875mg  Kidney Biopsy  2016: 16 glomeruli, ACR type 1 AVR type 2, significant microcirculatory changes, c4d negative, No DSA, 5 to10% fibrosis. Treated with thymo x8 2016- no rejection      Diastolic heart failure     Encounter for blood transfusion     ESRD on RRT since 10/2013 10/29/2013    Biopsy proven diabetic nephropathy and lymphoplasmacytic interstitial infiltrate not c/w with AIN (ddx sjogrens or assoc with tamm-horsefall protein extravasation)     GERD (gastroesophageal reflux disease)     History of hepatitis C, s/p successful Rx w/ SVR - 2017    Completed 12 weeks harvoni w/ SVR    Hyperlipidemia     Hypertension     PIC line (peripherally inserted central catheter) flush     Prophylactic immunotherapy     Proteinuria     PVD (peripheral vascular disease) 2017    RLE BKA CT 16 Extensive atherosclerotic disease of the aorta and mesenteric arteries.     Renal hypertension     Type 2 diabetes mellitus with diabetic neuropathy, with long-term current use of insulin 2016    Vitamin B12 deficiency        Past  Surgical History:   Procedure Laterality Date    AMPUTATION, FOOT, TRANSMETATARSAL Left 10/31/2018    Performed by Karla Wheeler DPM at [facility] OR    AMPUTATION, FOOT, TRANSMETATARSAL Left 9/21/2018    Performed by Karla Wheeler DPM at [facility] OR    AORTOGRAPHY WITH SERIALOGRAPHY N/A 6/14/2018    Procedure: LEFT LEG ANGIOGRAM;  Surgeon: Donal Mcdonald MD;  Location: [facility] CATH LAB;  Service: Vascular;  Laterality: N/A;    av bovine graft      Left UE    AV FISTULA PLACEMENT      left UE    BIOPSY Tranplanted Kidney N/A 8/15/2016    Performed by Paulette Hanna MD at [facility] OR 2ND FLR    BIOPSY, LIVER, TRANSJUGULAR APPROACH N/A 4/24/2014    Performed by [facility] Diagnostic Provider at [facility] CATH LAB    CARDIAC CATHETERIZATION  02/2015    CLOSURE OF WOUND Left 9/24/2018    Procedure: CLOSURE, WOUND;  Surgeon: Karla Wheeler DPM;  Location: [facility] OR;  Service: Podiatry;  Laterality: Left;  Secondary Wound closure, extensive    CLOSURE, WOUND Left 9/24/2018    Performed by Karla Wheeler DPM at [facility] OR    FOOT AMPUTATION THROUGH METATARSAL Left 9/21/2018    Procedure: AMPUTATION, FOOT, TRANSMETATARSAL;  Surgeon: Karla Wheeler DPM;  Location: [facility] OR;  Service: Podiatry;  Laterality: Left;    FOOT AMPUTATION THROUGH METATARSAL Left 10/31/2018    Procedure: AMPUTATION, FOOT, TRANSMETATARSAL;  Surgeon: Karla Wheeler DPM;  Location: [facility] OR;  Service: Podiatry;  Laterality: Left;    INSERTION, CATHETER, VASCULAR N/A 10/31/2013    Performed by Ralph Mckeon MD at [facility] CATH LAB    IRRIGATION AND DEBRIDEMENT Left 7/9/2018    Performed by Katerina Magaña DPM at [facility] OR    IRRIGATION AND DEBRIDEMENT OF LOWER EXTREMITY Left 10/31/2018    Procedure: IRRIGATION AND DEBRIDEMENT, LOWER EXTREMITY;  Surgeon: Karla Wheeler DPM;  Location: [facility] OR;  Service: Podiatry;  Laterality: Left;    IRRIGATION AND DEBRIDEMENT, LOWER EXTREMITY Left 10/31/2018    Performed by Karla Wheeler DPM at  "[facility] OR    KIDNEY TRANSPLANT  05/21/2016    LEFT LEG ANGIOGRAM N/A 6/14/2018    Performed by Donal Mcdonald MD at [facility] CATH LAB    LEG AMPUTATION THROUGH KNEE  2011    right LE, started as nail puncture leading to diabetic ulcer    LENGTHENING, TENDON, ACHILLES Left 9/24/2018    Performed by Karla Wheeler DPM at [facility] OR    TRANSPLANT-KIDNEY Right 5/21/2016    Performed by Ronny Bergeron MD at [facility] OR [address] [place] [address]       Review of patient's allergies indicates:  No Known Allergies    Medications:  Medications Prior to Admission   Medication Sig    amLODIPine (NORVASC) 2.5 MG tablet Take 1 tablet (2.5 mg total) by mouth once daily.    gabapentin (NEURONTIN) 300 MG capsule Take 300 mg by mouth 3 (three) times daily.     insulin NPH (NOVOLIN N) 100 unit/mL injection Take 25 units subq with breakfast and 15 units at bedtime    metoprolol tartrate (LOPRESSOR) 25 MG tablet Take 1 tablet (25 mg total) by mouth 2 (two) times daily.    mycophenolate (CELLCEPT) 500 mg Tab Take 500 mg by mouth 2 (two) times daily.    predniSONE (DELTASONE) 5 MG tablet Take 1 tablet (5 mg total) by mouth once daily.    sodium bicarbonate 650 MG tablet Take 4 tablets (2,600 mg total) by mouth 2 (two) times daily.    tacrolimus (PROGRAF) 0.5 MG Cap Take 3 capsules (1.5 mg total) by mouth every 12 (twelve) hours. Z94.0 Kidney Transplant    aspirin (ECOTRIN) 81 MG EC tablet Take 1 tablet (81 mg total) by mouth once daily.    BD INSULIN PEN NEEDLE UF SHORT 31 gauge x 5/16" Ndle     BD INSULIN SYRINGE ULTRA-FINE 1 mL 31 gauge x 5/16 Syrg USE ONE AS DIRECTED FOUR TIMES DAILY WITH MEALS AND AT BEDTIME    blood sugar diagnostic Strp 1 each by Misc.(Non-Drug; Combo Route) route 3 (three) times daily.    blood-glucose meter kit Use as instructed    budesonide-formoterol 160-4.5 mcg (SYMBICORT) 160-4.5 mcg/actuation HFAA Inhale 2 puffs into the lungs every 12 (twelve) hours. Wash out mouth after using    ergocalciferol (ERGOCALCIFEROL) " "50,000 unit Cap Take 1 capsule (50,000 Units total) by mouth every 7 days. Take on Mondays    HYDROcodone-acetaminophen (NORCO)  mg per tablet Take 1 tablet by mouth every 4 (four) hours as needed.    inhalation device (BREATHERITE VALVED MDI CHAMBER) Use as directed for inhalation.    lancets Misc 1 each by Misc.(Non-Drug; Combo Route) route 3 (three) times daily.    NOVOLIN R REGULAR U-100 INSULN 100 unit/mL injection INJECT 6 UNITS WITH BREAKFAST AND 8 UNITS WITH DINNER, SUBCUTANEOUSLY 30 MIN BEFORE MEAL.    nozaseptin (NOZIN) nasal  1 each by Each Nare route 2 (two) times daily.    ondansetron (ZOFRAN-ODT) 4 MG TbDL Take 1 tablet (4 mg total) by mouth every 8 (eight) hours as needed.    pen needle, diabetic (EASY COMFORT PEN NEEDLES) 32 gauge x 5/32" Ndle Inject 1 each into the skin 3 (three) times daily.     Antibiotics (From admission, onward)    Start     Stop Route Frequency Ordered    11/01/18 2000  vancomycin (VANCOCIN) 1,250 mg in dextrose 5 % 250 mL IVPB      -- IV Every 24 hours (non-standard times) 10/31/18 2125    10/31/18 1445  piperacillin-tazobactam 4.5 g in dextrose 5 % 100 mL IVPB (ready to mix system)      -- IV Every 8 hours (non-standard times) 10/31/18 1332        Antifungals (From admission, onward)    None        Antivirals (From admission, onward)    None           Immunization History   Administered Date(s) Administered    Hepatitis A 03/18/2014       Family History     Problem Relation (Age of Onset)    Cancer Father    Diabetes Father    Heart failure Father, Mother    Stroke Father        Social History     Socioeconomic History    Marital status: Single     Spouse name: None    Number of children: None    Years of education: None    Highest education level: None   Social Needs    Financial resource strain: None    Food insecurity - worry: None    Food insecurity - inability: None    Transportation needs - medical: None    Transportation needs - " non-medical: None   Occupational History    Occupation: Disabled     Employer: DISABLED   Tobacco Use    Smoking status: Former Smoker     Packs/day: 1.00     Years: 40.00     Pack years: 40.00     Last attempt to quit: 2013     Years since quittin.8    Smokeless tobacco: Never Used   Substance and Sexual Activity    Alcohol use: Yes     Alcohol/week: 3.6 oz     Types: 6 Cans of beer per week     Comment: 6 beers/week    Drug use: No    Sexual activity: No   Other Topics Concern    None   Social History Narrative    Lives alone. Retired from construction and various jobs, now retired. Would like to return to work PT to alleviate boredom.     Review of Systems   Constitutional: Negative for chills, diaphoresis, fatigue and fever.   HENT: Negative for hearing loss, mouth sores, sore throat, tinnitus and trouble swallowing.    Eyes: Negative for pain, discharge and redness.   Respiratory: Negative for apnea, cough, choking, chest tightness, shortness of breath, wheezing and stridor.    Cardiovascular: Negative for chest pain, palpitations and leg swelling.   Gastrointestinal: Negative for abdominal distention, abdominal pain, blood in stool, constipation, diarrhea, nausea, rectal pain and vomiting.   Endocrine: Negative for cold intolerance, heat intolerance, polydipsia, polyphagia and polyuria.   Genitourinary: Negative for difficulty urinating, dysuria, flank pain, frequency, hematuria and urgency.   Musculoskeletal: Negative for arthralgias, back pain, gait problem, joint swelling, neck pain and neck stiffness.   Skin: Positive for color change and wound. Negative for rash.        Left foot with worsening erythema.    Allergic/Immunologic: Negative for food allergies.   Neurological: Negative for dizziness, tremors, seizures, syncope, speech difficulty, light-headedness and headaches.   Hematological: Negative for adenopathy. Does not bruise/bleed easily.   Psychiatric/Behavioral: Negative for  agitation and confusion. The patient is not nervous/anxious.    All other systems reviewed and are negative.    Objective:     Vital Signs (Most Recent):  Temp: 96.6 °F (35.9 °C) (11/01/18 0949)  Pulse: 70 (11/01/18 0949)  Resp: 18 (11/01/18 0949)  BP: (!) 122/58 (11/01/18 0949)  SpO2: (!) 92 % (11/01/18 0949) Vital Signs (24h Range):  Temp:  [96.6 °F (35.9 °C)-99.8 °F (37.7 °C)] 96.6 °F (35.9 °C)  Pulse:  [62-80] 70  Resp:  [10-23] 18  SpO2:  [91 %-99 %] 92 %  BP: (107-208)/(52-99) 122/58     Weight: 99.8 kg (220 lb 0.3 oz)  Body mass index is 30.69 kg/m².    Estimated Creatinine Clearance: 55.4 mL/min (A) (based on SCr of 1.6 mg/dL (H)).    Physical Exam   Constitutional: He is oriented to person, place, and time. He appears well-developed and well-nourished. No distress.   HENT:   Head: Normocephalic and atraumatic.   Mouth/Throat: Oropharynx is clear and moist.   Eyes: Conjunctivae and EOM are normal. Pupils are equal, round, and reactive to light.   Neck: Normal range of motion. Neck supple. No JVD present.   Cardiovascular: Normal rate, regular rhythm, normal heart sounds and intact distal pulses. Exam reveals no gallop and no friction rub.   No murmur heard.  Pulmonary/Chest: Effort normal and breath sounds normal. No stridor. No respiratory distress. He has no wheezes. He has no rales. He exhibits no tenderness.   Abdominal: Soft. Bowel sounds are normal. He exhibits no distension and no mass. There is no tenderness. There is no rebound and no guarding.   Musculoskeletal: Normal range of motion. He exhibits no edema or tenderness.   Neurological: He is alert and oriented to person, place, and time. No cranial nerve deficit.   Skin: Skin is warm and dry. No rash noted. He is not diaphoretic. There is erythema.   LLE dressing CDI.  Pictures in clinic today show + erythema with some sutures still intact.  + malodor per podiatry (see pictures)   Psychiatric: He has a normal mood and affect. His speech is normal.  Judgment and thought content normal. He is withdrawn. Cognition and memory are normal. He is inattentive.   Nursing note and vitals reviewed.              Significant Labs:   Blood Culture:   Recent Labs   Lab 09/18/18  1812 09/21/18  0907 09/21/18  1516 10/31/18  1054 10/31/18  1055   LABBLOO Gram stain aer bottle: Gram positive cocci in clusters resembling Staph   Gram stain susan bottle: Gram positive cocci in clusters resembling Staph   Results called to and read back by:NOA Eckert RN 09/19/2018  15:31  STAPHYLOCOCCUS AUREUS  ID consult required at [facility] and [place] locations.  For susceptibility see order # [id number]   No growth after 5 days. No growth after 5 days. No Growth to date No Growth to date     BMP:   Recent Labs   Lab 10/31/18  1052  11/01/18  0533   GLU  --    < > 193*   NA  --    < > 133*   K  --    < > 4.5   CL  --    < > 100   CO2  --    < > 24   BUN  --    < > 24*   CREATININE  --    < > 1.6*   CALCIUM  --    < > 8.8   MG 1.9  --   --     < > = values in this interval not displayed.     CBC:   Recent Labs   Lab 10/31/18  1054 11/01/18  0533   WBC 7.39 11.65   HGB 13.9* 12.4*   HCT 43.2 39.6*    182     Wound Culture:   Recent Labs   Lab 06/12/18  0912 07/09/18  0856 09/21/18  1235 10/18/18  1328   LABAERO Skin skylar,  no predominant organism CITROBACTER FREUNDII  Rare    KLEBSIELLA OXYTOCA  Rare   STAPHYLOCOCCUS AUREUS  Few  Skin skylar also present   Skin skylar,  no predominant organism     All pertinent labs within the past 24 hours have been reviewed.    Significant Imaging: I have reviewed all pertinent imaging results/findings within the past 24 hours.

## 2018-11-01 NOTE — PROGRESS NOTES
[facility] Hospital Medicine  Progress Note    Patient Name: Lavelle Ladd  MRN: [id number]  Patient Class: IP- Inpatient   Admission Date: 10/31/2018  Length of Stay: 1 days  Attending Physician: Karla Wheeler DPM  Primary Care Provider: Rishabh Esteban MD        Subjective:     Principal Problem:Abscess of left foot    HPI:  Lavelle Ladd is a 63 y.o. male  with a PMHx of COPD, CAD, Diastolic CHF, CKD, Renal transplant, GERD, Hepatitis C, HLD, HTN, PVD, and IDDM who presented to the hospital today as a direct admit for planned surgery today per Dr. Wheeler.  Left foot xray today showed amputation of the distal aspect of the left foot at the level of the metatarsal bones.  No lytic abnormalities to suggest osteomyelitis by plain radiograph.  No evidence of acute fracture or dislocation.  Bony mineralization is normal.  Diffuse soft tissue and dense vascular calcifications.  Large plantar calcaneal spur.  He is s/p left transmetatarsal amputation secondary to gangrene of multiple digits and JOSE R.  Patient underwent delayed primary wound closure, on 9/24/18.  He presented to podiatry today for his 2 week follow-up of wound closure 2/2 to wound dehiscence.  Patient missed his last postoperative visit as he states he was unable to get a ride to his appointment.  He also admits to ambulating on his amputation site.  He drove himself to clinic today as he was unable to get a ride.  He states that he has gotten his foot wet as he has taken showers covering extremity in a garbage bag.  He denies any fever, chills, foot pain, CP, SOB, N/V/D, and all other symptoms at this time.  Patient has a history of poor compliance and difficult living dispo as he lives alone.  In the past we did try to get patient admitted to skilled nursing facility, but he has refused such services.   Wound cx on 9/21 showed MSSA for which he has completed IV cefazolin for 14 days secondary to MSSA gangrene.  He has been  NPO since midnight.  He will be admitted to the Medicine unit under the care of [facility].  ID consulted per podiatry recs to guide antibiotic therapy postoperatively.  He is a Full Code.  His SDM is his sister Kecia Williamson who can be reached at [phone].                Hospital Course:  11/1/18 The patient is S/P I&D of left foot yesterday. No acute issues overnight. The patient reports that his pain is well controlled. Continue IV ABX, cultures pending. Infectious disease is following.     Interval History: The patient is S/P I&D of left foot yesterday. No acute issues overnight. The patient reports that his pain is well controlled. Continue IV ABX, cultures pending. Infectious disease is following.    Review of Systems   Constitutional: Negative for chills, diaphoresis, fatigue and fever.   HENT: Negative for hearing loss, mouth sores, sore throat, tinnitus and trouble swallowing.    Eyes: Negative for pain, discharge and redness.   Respiratory: Negative for apnea, cough, choking, chest tightness, shortness of breath, wheezing and stridor.    Cardiovascular: Negative for chest pain, palpitations and leg swelling.   Gastrointestinal: Negative for abdominal distention, abdominal pain, blood in stool, constipation, diarrhea, nausea, rectal pain and vomiting.   Endocrine: Negative for cold intolerance, heat intolerance, polydipsia, polyphagia and polyuria.   Genitourinary: Negative for difficulty urinating, dysuria, flank pain, frequency, hematuria and urgency.   Musculoskeletal: Negative for arthralgias, back pain, gait problem, joint swelling, neck pain and neck stiffness.   Skin: Positive for color change and wound. Negative for rash.        Left foot with worsening erythema.    Allergic/Immunologic: Negative for food allergies.   Neurological: Negative for dizziness, tremors, seizures, syncope, speech difficulty, light-headedness and headaches.   Hematological: Negative for adenopathy. Does not  bruise/bleed easily.   Psychiatric/Behavioral: Negative for agitation and confusion. The patient is not nervous/anxious.    All other systems reviewed and are negative.    Objective:     Vital Signs (Most Recent):  Temp: 96.6 °F (35.9 °C) (11/01/18 0949)  Pulse: 70 (11/01/18 0949)  Resp: 18 (11/01/18 0949)  BP: (!) 122/58 (11/01/18 0949)  SpO2: (!) 92 % (11/01/18 0949) Vital Signs (24h Range):  Temp:  [96.6 °F (35.9 °C)-99.8 °F (37.7 °C)] 96.6 °F (35.9 °C)  Pulse:  [62-80] 70  Resp:  [10-23] 18  SpO2:  [91 %-99 %] 92 %  BP: (107-208)/(52-99) 122/58     Weight: 99.8 kg (220 lb 0.3 oz)  Body mass index is 30.69 kg/m².    Intake/Output Summary (Last 24 hours) at 11/1/2018 1334  Last data filed at 11/1/2018 0606  Gross per 24 hour   Intake 600 ml   Output --   Net 600 ml      Physical Exam   Constitutional: He is oriented to person, place, and time. He appears well-developed and well-nourished. No distress.   HENT:   Head: Normocephalic and atraumatic.   Mouth/Throat: Oropharynx is clear and moist.   Eyes: Conjunctivae and EOM are normal. Pupils are equal, round, and reactive to light.   Neck: Normal range of motion. Neck supple. No JVD present.   Cardiovascular: Normal rate, regular rhythm, normal heart sounds and intact distal pulses. Exam reveals no gallop and no friction rub.   No murmur heard.  Pulmonary/Chest: Effort normal and breath sounds normal. No stridor. No respiratory distress. He has no wheezes. He has no rales. He exhibits no tenderness.   Abdominal: Soft. Bowel sounds are normal. He exhibits no distension and no mass. There is no tenderness. There is no rebound and no guarding.   Musculoskeletal: Normal range of motion. He exhibits no edema or tenderness.   Neurological: He is alert and oriented to person, place, and time. No cranial nerve deficit.   Skin: Skin is warm and dry. No rash noted. He is not diaphoretic. There is erythema.   LLE dressing CDI.  Pictures in clinic today show + erythema with  some sutures still intact.  + malodor per podiatry (see pictures)   Psychiatric: He has a normal mood and affect. His speech is normal. Judgment and thought content normal. He is withdrawn. Cognition and memory are normal. He is inattentive.   Nursing note and vitals reviewed.      Significant Labs: All pertinent labs within the past 24 hours have been reviewed.    Significant Imaging:            Assessment/Plan:      * Abscess of left foot    - Admit to Medicine  - Podiatry following and plans for surgery today  - Wound cx and BD pending, will need to f/u  - Continue local wound care post-op per podiatry rec  - Start Vanc and Zosyn post-operatively  - ID consult to guide ABX given h/o MSSA in wound on 9/21  - Monitor  11/1/18 The patient is S/P I&D of left foot yesterday. No acute issues overnight. The patient reports that his pain is well controlled. Continue IV ABX, cultures pending. Infectious disease is following.       Hx of right BKA    - Site is intact       Non compliance with medical treatment    - Educated on the importance of adhering to medical treatment plan  - Patient has a limited support system  -  consult for DC planning       S/P transmetatarsal amputation of foot, left    - with delayed wound healing secondary to non-compliance with podiatry recommendations (patient reports getting foot wet and bearing weight)  - Defer care to podiatry  - See plan as per above       Cellulitis of left foot    - See plan as per above       Peripheral vascular disease    - Resume home ASA tomorrow  - Vascular f/u as needed       Chronic bilateral low back pain with left-sided sciatica    - Resume home Norco PRN  - Monitor       Kidney transplant recipient    - patient is noted to be a poor historian and does not know what medications he takes at home  - per d/w Edmundo Hernandez today, he is currently prescribed Prednisone 5 mg daily, Cellcept 500 mg BID, and Prograf 1.5 mg BID  - Nephrology consulted  - Continue home  Prednisone and Prograf for now  - Hold Cellcept per Dr. Staley  - Monitor Prograf levels   - Followed by Dr. Blanco in [place]        Chronic kidney disease (CKD), stage III (moderate)    - Cr stable at 1.5  - Daily BMP  - Monitor       Chronic obstructive pulmonary disease    - Currently appears compensated  - Continue home meds  - Supplemental O2 PRN  - Monitor       Coronary artery disease involving native coronary artery of native heart without angina pectoris    - Currently asymptomatic  - Continue home ASA in AM  - Tele montoring       Type 2 diabetes mellitus with hyperglycemia, with long-term current use of insulin    - A1c 8.0 in September  - Accu checks ACHS with SSI PRN  - Resume home insulin once patient is eating  - ADA diet after surgery  - Monitor       Essential hypertension    - BP well controlled  - Continue home Norvasc and Metoprolol  - Monitor         VTE Risk Mitigation (From admission, onward)        Ordered     IP VTE HIGH RISK PATIENT  Once      10/31/18 1731     Place sequential compression device  Until discontinued      10/31/18 1109              Lavelle Kamara NP  Department of Hospital Medicine   [facility]

## 2018-11-01 NOTE — PROGRESS NOTES
[facility]   Nephrology  Progress Note    Patient Name: Lavelle Ladd  MRN: [id number]  Admission Date: 10/31/2018  Hospital Length of Stay: 1 days  Attending Provider: Karla Wheeler DPM   Primary Care Physician: Rishabh Esteban MD  Principal Problem:Abscess of left foot    Subjective:     HPI: Lavelle Ladd is a pleasant 63 y old  man  , s/p   donor ( brain death ) kidney transplant on 16.  He has CKD stage 3 - GFR 30-59 and his baseline creatinine is between 1.5-1.8. He takes  prednisone 5 mg daily and tacrolimus 1.5 mg twice a day , for maintenance immunosuppression.  He he is also on CellCept 500 mg twice a day, will hold this medication due to cellulitis of his left foot, he is status post left transmetatarsal amputation on 2018, patient was seen for follow-up appointment in the clinic yesterday and notice that his foot was infected, patient was admitted to the hospital for further management,      Interval History:     18 - s/p surgery on his left foot y'day, wound closure ,     Review of patient's allergies indicates:No Known Allergies    Current Facility-Administered Medications   Medication Frequency    amLODIPine tablet 2.5 mg Daily    arformoterol nebulizer solution 15 mcg Q12H    aspirin EC tablet 81 mg Daily    budesonide nebulizer solution 0.5 mg Q12H    dextrose 50% injection 12.5 g PRN    dextrose 50% injection 25 g PRN    gabapentin capsule 300 mg TID    glucagon (human recombinant) injection 1 mg PRN    glucose chewable tablet 16 g PRN    glucose chewable tablet 24 g PRN    hydrALAZINE tablet 25 mg Q8H PRN    HYDROcodone-acetaminophen  mg per tablet 1 tablet Q4H PRN    insulin aspart U-100 pen 0-5 Units QID (AC + HS) PRN    metoprolol tartrate (LOPRESSOR) tablet 25 mg BID    nozaseptin (NOZIN) nasal  BID    ondansetron disintegrating tablet 8 mg Q8H PRN    ondansetron injection 4 mg Q8H PRN     piperacillin-tazobactam 4.5 g in dextrose 5 % 100 mL IVPB (ready to mix system) Q8H    predniSONE tablet 5 mg Daily    sodium bicarbonate tablet 2,600 mg BID    sodium chloride 0.9% flush 3 mL PRN    sodium chloride 0.9% flush 3 mL PRN    sodium chloride 0.9% flush 5 mL PRN    tacrolimus capsule 1.5 mg BID    vancomycin (VANCOCIN) 1,250 mg in dextrose 5 % 250 mL IVPB Q24H       Objective:     Vital Signs (Most Recent):  Temp: 96.6 °F (35.9 °C) (11/01/18 0949)  Pulse: 70 (11/01/18 0949)  Resp: 18 (11/01/18 0949)  BP: (!) 122/58 (11/01/18 0949)  SpO2: (!) 92 % (11/01/18 0949)  O2 Device (Oxygen Therapy): room air (11/01/18 0949) Vital Signs (24h Range):  Temp:  [96.6 °F (35.9 °C)-99.8 °F (37.7 °C)] 96.6 °F (35.9 °C)  Pulse:  [62-80] 70  Resp:  [10-23] 18  SpO2:  [91 %-99 %] 92 %  BP: (107-208)/(52-91) 122/58     Weight: 99.8 kg (220 lb 0.3 oz) (10/31/18 1049)  Body mass index is 30.69 kg/m².  Body surface area is 2.24 meters squared.    I/O last 3 completed shifts:  In: 600 [IV Piggyback:600]  Out: -     Physical Exam   Constitutional: He is oriented to person, place, and time. He appears well-developed and well-nourished. No distress.   HENT:   Head: Normocephalic and atraumatic.   Eyes: Pupils are equal, round, and reactive to light.   Neck: Normal range of motion. Neck supple. No tracheal deviation present. No thyromegaly present.   Cardiovascular: Normal rate, regular rhythm, normal heart sounds and intact distal pulses. Exam reveals no gallop and no friction rub.   No murmur heard.  Pulmonary/Chest: Effort normal and breath sounds normal. He has no wheezes. He has no rales.   Abdominal: Soft. He exhibits no mass. There is no tenderness. There is no rebound and no guarding.   No allograft tenderness    Musculoskeletal: Normal range of motion. He exhibits no edema.   S/p right BKS, Left foot in dressings, s/p left transmetatarsal amputation   Lymphadenopathy:     He has no cervical adenopathy.    Neurological: He is alert and oriented to person, place, and time.   Skin: Skin is warm. No rash noted. He is not diaphoretic. No erythema.   Nursing note and vitals reviewed.      Significant Labs:  CBC:   Recent Labs   Lab 11/01/18  0533   WBC 11.65   RBC 4.35*   HGB 12.4*   HCT 39.6*      MCV 91   MCH 28.5   MCHC 31.3*     CMP:   Recent Labs   Lab 10/31/18  1053 11/01/18  0533   * 193*   CALCIUM 9.5 8.8   ALBUMIN 3.3*  --    PROT 7.2  --     133*   K 4.8 4.5   CO2 23 24    100   BUN 20 24*   CREATININE 1.5* 1.6*   ALKPHOS 86  --    ALT 12  --    AST 20  --    BILITOT 0.6  --      Coagulation:   Recent Labs   Lab 10/31/18  1126   INR 1.0     LFTs:   Recent Labs   Lab 10/31/18  1053   ALT 12   AST 20   ALKPHOS 86   BILITOT 0.6   PROT 7.2   ALBUMIN 3.3*     All labs within the past 24 hours have been reviewed.     Significant Imaging:  Reviewed     Prograf level - 4.7     Assessment/Plan:     Kidney transplant recipient    1. S/p DDRT in 2016 : stable allograft fn , Cr at baseline at 1.5 mg/dl, serum creatinine fluctuates between 1.5 and 2 mg/dL,     cont Prograf 1.5 mg bid and Pred 5 mg daily , was on CellCept 500 mg twice a day prior to admission, this medication will be held due to cellulitis of his leg,     2. HTN : BP controlled,      3. Left foot infection,  Podiatry and ID  Following ,      4. Metabolic acidosis : cont Bicarb supplements      5. Anemia of CKD : stable Hb     6. DM-2 - per medicine team                I will follow-up with patient. Please contact us if you have any additional questions.     Total time spent 40 minutes including time needed to review the records,  patient  evaluation, documentation, face-to-face discussion with the patient,  primay team,   more than 50% of the time was spent on coordination of care and counseling.       Carlos Staley MD  Nephrology  [facility]

## 2018-11-01 NOTE — HOSPITAL COURSE
11/1/18 The patient is S/P I&D of left foot yesterday. No acute issues overnight. The patient reports that his pain is well controlled. Continue IV ABX, cultures pending. Infectious disease is following. 11/2/18 wound culture growing Enterococcus and Enterobacter. Podiatry recommends SNF vs LTAC placement due to potential for limb loss. 11/3/18- Will continue zyvox and zosyn. Creatinine level at baseline. Surgery planned for Monday by Dr. Wheeler for secondary wound closure. 11/4/18- Patient awake and alert today. Dr. Guthrie at bedside performing wound care to left transmetatarsal amputation stump. Patient tolerated well. Plan for surgery tomorrow. Continue zyvox, IV zosyn discontinued and IV Rocephin added. 11/5/18-Creatinine stable. Blood pressure elevate this morning, lisinopril added per nephrology. Awaiting surgery for wound closure. 11/6/18-  S/p Left foot Secondary Wound Closure and debridement of Bone/Transmetatarsal Amputation by Dr. Wheeler.  Patine spike fever this afternoon. WBCs remain negative. CXR pending. Encourage IS use. Infectious disease on board. Social work consult for discharge planning (SNF vs LTAC). Patient will need IV abx x 6 weeks. 11/7/18- Early morning patient became hypotensive. Bidil and Lisinopril discontinued, amlodipine decreased to 2.5 mg daily. Blood pressure still marginally low but better. Creatinine increased to 2.6, most likely due to hypotension. Continue IVFs. 11/8/18- Creatinine trending down, will continue gentle IVFs. CT chest negative for acute findings. 11/9/18-No change in status, will continue current management plan.  11/10/18- Osteomyelitis diagnosed per pathology report-per Podiatry with long term IV antibiotics anticipated.  ID following.  CM to assist with placement with discharge to [facility] anticipated.  11/11/18-Dressing to LLE changed per Podiatry.  Creatinine stable.  H/H stable.  Vital signs stable.  Zyvox and Levaquin continued.  11/12/18- PICC  line placed.  Pt counseled on the importance of maintaining compliance with prescribed medication regime and diabetic dietary restrictions with understanding verbalized.  Pt accepted to [facility] pending insurance authorization.  11/13/18- pt stable and eligible for discharge to [facility].  Pt seen and examined on the date of discharge and deemed suitable for discharge to SNF.  Pt encouraged to maintain compliance with prescribed medication regime, diabetic dietary restrictions, and follow up appointments with understanding verbalized.  Current medications resumed with Tessalon, Levaquin, Zyvox, and Norvasc prescribed.  Pt instructed to follow up with PCP, ID, and Podiatry.

## 2018-11-01 NOTE — ASSESSMENT & PLAN NOTE
- Admit to Medicine  - Podiatry following and plans for surgery today  - Wound cx and BD pending, will need to f/u  - Continue local wound care post-op per podiatry rec  - Start Vanc and Zosyn post-operatively  - ID consult to guide ABX given h/o MSSA in wound on 9/21  - Monitor  11/1/18 The patient is S/P I&D of left foot yesterday. No acute issues overnight. The patient reports that his pain is well controlled. Continue IV ABX, cultures pending. Infectious disease is following.

## 2018-11-01 NOTE — ANESTHESIA POSTPROCEDURE EVALUATION
"Anesthesia Post Evaluation    Patient: Lavelle Ladd    Procedure(s) Performed: Procedure(s) (LRB):  Incision and Drainage, Left Foot Possible Revisional Amputation- Transmetatarsal (Left)    Final Anesthesia Type: MAC  Patient location during evaluation: PACU  Patient participation: Yes- Able to Participate  Level of consciousness: awake and alert and oriented  Post-procedure vital signs: reviewed and stable  Pain management: adequate  Airway patency: patent  PONV status at discharge: No PONV  Anesthetic complications: no      Cardiovascular status: hemodynamically stable  Respiratory status: spontaneous ventilation  Hydration status: euvolemic  Follow-up not needed.        Visit Vitals  BP (!) 173/79   Pulse 63   Temp 36.2 °C (97.2 °F) (Temporal)   Resp 20   Ht 5' 11" (1.803 m)   Wt 99.8 kg (220 lb 0.3 oz)   SpO2 (!) 93%   BMI 30.69 kg/m²       Pain/Shereen Score: Pain Assessment Performed: Yes (10/31/2018  5:30 PM)  Presence of Pain: denies (10/31/2018  5:30 PM)  Pain Rating Prior to Med Admin: 6 (10/31/2018  7:39 PM)  Pain Rating Post Med Admin: 4 (10/31/2018  3:14 PM)  Shereen Score: 8 (10/31/2018  5:30 PM)        "

## 2018-11-01 NOTE — CONSULTS
Clinical Pharmacy Consult Note: Vancomycin Dosing   Date: 10/31/2018      Pharmacy consulted by JAY DuarteM, to dose vancomycin for treatment of left foot abscess/cellulitis of left foot.   Goal trough: 10-15 mcg/mL   Tmax/24h: 98.6   Estimated Creatinine Clearance: 59.1 mL/min (A) (based on SCr of 1.5 mg/dL (H)).   Lab Results   Component Value Date    BUN 20 10/31/2018      Lab Results   Component Value Date    WBC 7.39 10/31/2018      Microbiology Results (last 7 days)       Procedure Component Value Units Date/Time    Aerobic culture [[id number]] Collected:  10/31/18 1640    Order Status:  Sent Specimen:  Incision site from Foot, Left Updated:  10/31/18 2117    Culture, Anaerobic [[id number]] Collected:  10/31/18 1640    Order Status:  Sent Specimen:  Incision site from Foot, Left Updated:  10/31/18 2117    Blood culture [[id number]] Collected:  10/31/18 1054    Order Status:  Sent Specimen:  Blood Updated:  10/31/18 1650    Blood culture [[id number]] Collected:  10/31/18 1055    Order Status:  Sent Specimen:  Blood Updated:  10/31/18 1650    Blood culture [[id number]]     Order Status:  Canceled Specimen:  Blood     Blood culture [[id number]]     Order Status:  Canceled Specimen:  Blood            Ideal BW: 75.3 Kg    Actual BW: 99.8 Kg  Adjusted BW: 85.1 Kg <---Will use for dosing weight.     Antibiotics (From admission, onward)      Start     Stop Route Frequency Ordered    10/31/18 1915  vancomycin (VANCOCIN) 1,250 mg in dextrose 5 % 250 mL IVPB      -- IV Once 10/31/18 1813    10/31/18 1445  piperacillin-tazobactam 4.5 g in dextrose 5 % 100 mL IVPB (ready to mix system)      -- IV Every 8 hours (non-standard times) 10/31/18 1332            Plan: Based on Estimated Creatinine Clearance: 59.1 mL/min (A) (based on SCr of 1.5 mg/dL (H)). and weight of 85.1 Kg, pharmacy will initiate vancomycin 1250 mg IV every 24 hours and draw a level prior to the 3rd dose.     Vancomycin trough due 11/2/18 at 1900.      Pharmacy will continue to follow patients clinical status, renal function, C&S, and adjust dose as necessary to maintain trough levels between 10 and 15 mcg/mL.     Thank you for allowing us to participate in this patient's care.     Radha Lopez   10/31/2018 9:18 PM

## 2018-11-01 NOTE — SUBJECTIVE & OBJECTIVE
Past Medical History:   Diagnosis Date    DINORAH (acute kidney injury) 2016    Arthritis     Chronic obstructive pulmonary disease 2016    Coronary artery disease involving native coronary artery of native heart without angina pectoris 2016     donor kidney transplant for DM 16     Induction with Thymo x3 and IV solumedrol to total 875mg  Kidney Biopsy  2016: 16 glomeruli, ACR type 1 AVR type 2, significant microcirculatory changes, c4d negative, No DSA, 5 to10% fibrosis. Treated with thymo x8 2016- no rejection      Diastolic heart failure     Encounter for blood transfusion     ESRD on RRT since 10/2013 10/29/2013    Biopsy proven diabetic nephropathy and lymphoplasmacytic interstitial infiltrate not c/w with AIN (ddx sjogrens or assoc with tamm-horsefall protein extravasation)     GERD (gastroesophageal reflux disease)     History of hepatitis C, s/p successful Rx w/ SVR12 - 2017    Completed 12 weeks harvoni w/ SVR    Hyperlipidemia     Hypertension     PIC line (peripherally inserted central catheter) flush     Prophylactic immunotherapy     Proteinuria     PVD (peripheral vascular disease) 2017    RLE BKA CT 16 Extensive atherosclerotic disease of the aorta and mesenteric arteries.     Renal hypertension     Type 2 diabetes mellitus with diabetic neuropathy, with long-term current use of insulin 2016    Vitamin B12 deficiency        Past Surgical History:   Procedure Laterality Date    AMPUTATION, FOOT, TRANSMETATARSAL Left 10/31/2018    Performed by Karla Wheeler DPM at [facility] OR    AMPUTATION, FOOT, TRANSMETATARSAL Left 2018    Performed by Karla Wheeler DPM at [facility] OR    AORTOGRAPHY WITH SERIALOGRAPHY N/A 2018    Procedure: LEFT LEG ANGIOGRAM;  Surgeon: Donal Mcdonald MD;  Location: [facility] CATH LAB;  Service: Vascular;  Laterality: N/A;    av bovine graft      Left UE    AV FISTULA PLACEMENT      left UE     BIOPSY Tranplanted Kidney N/A 8/15/2016    Performed by Paulette Hanna MD at [facility] OR 2ND FLR    BIOPSY, LIVER, TRANSJUGULAR APPROACH N/A 4/24/2014    Performed by [facility] Diagnostic Provider at [facility] CATH LAB    CARDIAC CATHETERIZATION  02/2015    CLOSURE OF WOUND Left 9/24/2018    Procedure: CLOSURE, WOUND;  Surgeon: Karla Wheeler DPM;  Location: [facility] OR;  Service: Podiatry;  Laterality: Left;  Secondary Wound closure, extensive    CLOSURE, WOUND Left 9/24/2018    Performed by Karla Wheeler DPM at [facility] OR    FOOT AMPUTATION THROUGH METATARSAL Left 9/21/2018    Procedure: AMPUTATION, FOOT, TRANSMETATARSAL;  Surgeon: Karla Wheeler DPM;  Location: [facility] OR;  Service: Podiatry;  Laterality: Left;    FOOT AMPUTATION THROUGH METATARSAL Left 10/31/2018    Procedure: AMPUTATION, FOOT, TRANSMETATARSAL;  Surgeon: Karla Wheeler DPM;  Location: [facility] OR;  Service: Podiatry;  Laterality: Left;    INSERTION, CATHETER, VASCULAR N/A 10/31/2013    Performed by Ralph Mckeon MD at [facility] CATH LAB    IRRIGATION AND DEBRIDEMENT Left 7/9/2018    Performed by Katerina Magaña DPM at [facility] OR    IRRIGATION AND DEBRIDEMENT OF LOWER EXTREMITY Left 10/31/2018    Procedure: IRRIGATION AND DEBRIDEMENT, LOWER EXTREMITY;  Surgeon: Karla Wheeler DPM;  Location: [facility] OR;  Service: Podiatry;  Laterality: Left;    IRRIGATION AND DEBRIDEMENT, LOWER EXTREMITY Left 10/31/2018    Performed by Karla Wheeler DPM at [facility] OR    KIDNEY TRANSPLANT  05/21/2016    LEFT LEG ANGIOGRAM N/A 6/14/2018    Performed by Donal Mcdonald MD at [facility] CATH LAB    LEG AMPUTATION THROUGH KNEE  2011    right LE, started as nail puncture leading to diabetic ulcer    LENGTHENING, TENDON, ACHILLES Left 9/24/2018    Performed by Karla Wheeler DPM at [facility] OR    TRANSPLANT-KIDNEY Right 5/21/2016    Performed by Ronny Bergeron MD at [facility] OR 2ND FLR       Review of patient's allergies indicates:  No Known  "Allergies    Medications:  Medications Prior to Admission   Medication Sig    amLODIPine (NORVASC) 2.5 MG tablet Take 1 tablet (2.5 mg total) by mouth once daily.    gabapentin (NEURONTIN) 300 MG capsule Take 300 mg by mouth 3 (three) times daily.     insulin NPH (NOVOLIN N) 100 unit/mL injection Take 25 units subq with breakfast and 15 units at bedtime    metoprolol tartrate (LOPRESSOR) 25 MG tablet Take 1 tablet (25 mg total) by mouth 2 (two) times daily.    mycophenolate (CELLCEPT) 500 mg Tab Take 500 mg by mouth 2 (two) times daily.    predniSONE (DELTASONE) 5 MG tablet Take 1 tablet (5 mg total) by mouth once daily.    sodium bicarbonate 650 MG tablet Take 4 tablets (2,600 mg total) by mouth 2 (two) times daily.    tacrolimus (PROGRAF) 0.5 MG Cap Take 3 capsules (1.5 mg total) by mouth every 12 (twelve) hours. Z94.0 Kidney Transplant    aspirin (ECOTRIN) 81 MG EC tablet Take 1 tablet (81 mg total) by mouth once daily.    BD INSULIN PEN NEEDLE UF SHORT 31 gauge x 5/16" Ndle     BD INSULIN SYRINGE ULTRA-FINE 1 mL 31 gauge x 5/16 Syrg USE ONE AS DIRECTED FOUR TIMES DAILY WITH MEALS AND AT BEDTIME    blood sugar diagnostic Strp 1 each by Misc.(Non-Drug; Combo Route) route 3 (three) times daily.    blood-glucose meter kit Use as instructed    budesonide-formoterol 160-4.5 mcg (SYMBICORT) 160-4.5 mcg/actuation HFAA Inhale 2 puffs into the lungs every 12 (twelve) hours. Wash out mouth after using    ergocalciferol (ERGOCALCIFEROL) 50,000 unit Cap Take 1 capsule (50,000 Units total) by mouth every 7 days. Take on Mondays    HYDROcodone-acetaminophen (NORCO)  mg per tablet Take 1 tablet by mouth every 4 (four) hours as needed.    inhalation device (BREATHERITE VALVED MDI CHAMBER) Use as directed for inhalation.    lancets Misc 1 each by Misc.(Non-Drug; Combo Route) route 3 (three) times daily.    NOVOLIN R REGULAR U-100 INSULN 100 unit/mL injection INJECT 6 UNITS WITH BREAKFAST AND 8 UNITS WITH " "DINNER, SUBCUTANEOUSLY 30 MIN BEFORE MEAL.    nozaseptin (NOZIN) nasal  1 each by Each Nare route 2 (two) times daily.    ondansetron (ZOFRAN-ODT) 4 MG TbDL Take 1 tablet (4 mg total) by mouth every 8 (eight) hours as needed.    pen needle, diabetic (EASY COMFORT PEN NEEDLES) 32 gauge x 5/32" Ndle Inject 1 each into the skin 3 (three) times daily.     Antibiotics (From admission, onward)    Start     Stop Route Frequency Ordered    18  vancomycin (VANCOCIN) 1,250 mg in dextrose 5 % 250 mL IVPB      -- IV Every 24 hours (non-standard times) 10/31/18 2125    10/31/18 1445  piperacillin-tazobactam 4.5 g in dextrose 5 % 100 mL IVPB (ready to mix system)      -- IV Every 8 hours (non-standard times) 10/31/18 1332        Antifungals (From admission, onward)    None        Antivirals (From admission, onward)    None           Immunization History   Administered Date(s) Administered    Hepatitis A 2014       Family History     Problem Relation (Age of Onset)    Cancer Father    Diabetes Father    Heart failure Father, Mother    Stroke Father        Social History     Socioeconomic History    Marital status: Single     Spouse name: None    Number of children: None    Years of education: None    Highest education level: None   Social Needs    Financial resource strain: None    Food insecurity - worry: None    Food insecurity - inability: None    Transportation needs - medical: None    Transportation needs - non-medical: None   Occupational History    Occupation: Disabled     Employer: DISABLED   Tobacco Use    Smoking status: Former Smoker     Packs/day: 1.00     Years: 40.00     Pack years: 40.00     Last attempt to quit: 2013     Years since quittin.8    Smokeless tobacco: Never Used   Substance and Sexual Activity    Alcohol use: Yes     Alcohol/week: 3.6 oz     Types: 6 Cans of beer per week     Comment: 6 beers/week    Drug use: No    Sexual activity: No   Other " Topics Concern    None   Social History Narrative    Lives alone. Retired from construction and various jobs, now retired. Would like to return to work PT to alleviate boredom.     Review of Systems   Constitutional: Negative for chills, diaphoresis, fatigue and fever.   HENT: Negative for hearing loss, mouth sores, sore throat, tinnitus and trouble swallowing.    Eyes: Negative for pain, discharge and redness.   Respiratory: Negative for apnea, cough, choking, chest tightness, shortness of breath, wheezing and stridor.    Cardiovascular: Negative for chest pain, palpitations and leg swelling.   Gastrointestinal: Negative for abdominal distention, abdominal pain, blood in stool, constipation, diarrhea, nausea, rectal pain and vomiting.   Endocrine: Negative for cold intolerance, heat intolerance, polydipsia, polyphagia and polyuria.   Genitourinary: Negative for difficulty urinating, dysuria, flank pain, frequency, hematuria and urgency.   Musculoskeletal: Negative for arthralgias, back pain, gait problem, joint swelling, neck pain and neck stiffness.   Skin: Positive for color change and wound. Negative for rash.        Left foot with worsening erythema.    Allergic/Immunologic: Negative for food allergies.   Neurological: Negative for dizziness, tremors, seizures, syncope, speech difficulty, light-headedness and headaches.   Hematological: Negative for adenopathy. Does not bruise/bleed easily.   Psychiatric/Behavioral: Negative for agitation and confusion. The patient is not nervous/anxious.    All other systems reviewed and are negative.    Objective:     Vital Signs (Most Recent):  Temp: 96.6 °F (35.9 °C) (11/01/18 0949)  Pulse: 70 (11/01/18 0949)  Resp: 18 (11/01/18 0949)  BP: (!) 122/58 (11/01/18 0949)  SpO2: (!) 92 % (11/01/18 0949) Vital Signs (24h Range):  Temp:  [96.6 °F (35.9 °C)-99.8 °F (37.7 °C)] 96.6 °F (35.9 °C)  Pulse:  [62-80] 70  Resp:  [10-23] 18  SpO2:  [91 %-99 %] 92 %  BP: (107-208)/(52-99)  122/58     Weight: 99.8 kg (220 lb 0.3 oz)  Body mass index is 30.69 kg/m².    Estimated Creatinine Clearance: 55.4 mL/min (A) (based on SCr of 1.6 mg/dL (H)).    Physical Exam   Constitutional: He is oriented to person, place, and time. He appears well-developed and well-nourished. No distress.   HENT:   Head: Normocephalic and atraumatic.   Mouth/Throat: Oropharynx is clear and moist.   Eyes: Conjunctivae and EOM are normal. Pupils are equal, round, and reactive to light.   Neck: Normal range of motion. Neck supple. No JVD present.   Cardiovascular: Normal rate, regular rhythm, normal heart sounds and intact distal pulses. Exam reveals no gallop and no friction rub.   No murmur heard.  Pulmonary/Chest: Effort normal and breath sounds normal. No stridor. No respiratory distress. He has no wheezes. He has no rales. He exhibits no tenderness.   Abdominal: Soft. Bowel sounds are normal. He exhibits no distension and no mass. There is no tenderness. There is no rebound and no guarding.   Musculoskeletal: Normal range of motion. He exhibits no edema or tenderness.   Neurological: He is alert and oriented to person, place, and time. No cranial nerve deficit.   Skin: Skin is warm and dry. No rash noted. He is not diaphoretic. There is erythema.   LLE dressing CDI.  Pictures in clinic today show + erythema with some sutures still intact.  + malodor per podiatry (see pictures)   Psychiatric: He has a normal mood and affect. His speech is normal. Judgment and thought content normal. He is withdrawn. Cognition and memory are normal. He is inattentive.   Nursing note and vitals reviewed.              Significant Labs:   Blood Culture:   Recent Labs   Lab 09/18/18  1812 09/21/18  0907 09/21/18  1516 10/31/18  1054 10/31/18  1055   LABBLOO Gram stain aer bottle: Gram positive cocci in clusters resembling Staph   Gram stain susan bottle: Gram positive cocci in clusters resembling Staph   Results called to and read back by:NOA  Tomeka BRAR 09/19/2018  15:31  STAPHYLOCOCCUS AUREUS  ID consult required at [facility] and [facility] locations.  For susceptibility see order # [id number]   No growth after 5 days. No growth after 5 days. No Growth to date No Growth to date     BMP:   Recent Labs   Lab 10/31/18  1052  11/01/18  0533   GLU  --    < > 193*   NA  --    < > 133*   K  --    < > 4.5   CL  --    < > 100   CO2  --    < > 24   BUN  --    < > 24*   CREATININE  --    < > 1.6*   CALCIUM  --    < > 8.8   MG 1.9  --   --     < > = values in this interval not displayed.     CBC:   Recent Labs   Lab 10/31/18  1054 11/01/18  0533   WBC 7.39 11.65   HGB 13.9* 12.4*   HCT 43.2 39.6*    182     Wound Culture:   Recent Labs   Lab 06/12/18  0912 07/09/18  0856 09/21/18  1235 10/18/18  1328   LABAERO Skin skylar,  no predominant organism CITROBACTER FREUNDII  Rare    KLEBSIELLA OXYTOCA  Rare   STAPHYLOCOCCUS AUREUS  Few  Skin skylar also present   Skin skylar,  no predominant organism     All pertinent labs within the past 24 hours have been reviewed.    Significant Imaging: I have reviewed all pertinent imaging results/findings within the past 24 hours.

## 2018-11-01 NOTE — PLAN OF CARE
Problem: Patient Care Overview  Goal: Plan of Care Review  Outcome: Ongoing (interventions implemented as appropriate)  POC reviewed, including indications and possible side effects of administered medications. Patient verbalized understanding and teach back. No adverse reactions noted. Patient c/o L foot pain. Administered medications per order. Dressing remains CDI. NSR on heart monitor. Contact precautions maintained. Patient remains free of falls and injuries during shift. Will continue to monitor.      Chart check complete.

## 2018-11-01 NOTE — ASSESSMENT & PLAN NOTE
1. S/p DDRT in 2016 : stable allograft fn , Cr at baseline at 1.5 mg/dl, serum creatinine fluctuates between 1.5 and 2 mg/dL,     cont Prograf 1.5 mg bid and Pred 5 mg daily , was on CellCept 500 mg twice a day prior to admission, this medication will be held due to cellulitis of his leg,     2. HTN : BP controlled,      3. Left foot infection,  Podiatry and ID  Following ,      4. Metabolic acidosis : cont Bicarb supplements      5. Anemia of CKD : stable Hb     6. DM-2 - per medicine team

## 2018-11-01 NOTE — ASSESSMENT & PLAN NOTE
- patient is noted to be a poor historian and does not know what medications he takes at home  - per d/w Edmundo Hernandez today, he is currently prescribed Prednisone 5 mg daily, Cellcept 500 mg BID, and Prograf 1.5 mg BID  - Nephrology consulted  - Continue home Prednisone and Prograf for now  - Hold Cellcept per Dr. Staley  - Monitor Prograf levels   - Followed by Dr. Blanco in [place]

## 2018-11-01 NOTE — PROGRESS NOTES
[facility] -   PODIATRIC MEDICINE AND SURGERY  PROGRESS NOTE    Admission Date: 10/31/2018  LOS: 1      SUBJECTIVE  Pt seen at bedside this PM. Pt is s/p LEFT foot irrigation and debridement of bone.  This is POD #1.  Pt denies overnight events. Denies fever, chills, nausea, or vomiting. Relates pain is much improved with pain medication. He has had bowel movements.     OBJECTIVE  [unfilled]  Temp (24hrs), Av.5 °F (36.9 °C), Min:96.6 °F (35.9 °C), Max:99.8 °F (37.7 °C)    Vitals:    10/31/18 1730 10/31/18 1939 10/31/18 2030 10/31/18 2100   BP: (!) 173/79  (!) 124/58    Pulse: 63 70 65 76   Resp:   18    Temp:   98.6 °F (37 °C)    TempSrc:   Oral    SpO2: (!) 93%  95%    Weight:       Height:        10/31/18 2302 10/31/18 2341 18 0059 18 0108   BP:  (!) 194/85 135/74    Pulse: 80 79 75 73   Resp:  20     Temp:  99.8 °F (37.7 °C)     TempSrc:  Oral     SpO2:  95%     Weight:       Height:        18 0300 18 0355 18 0558 18 0944   BP:  (!) 107/52     Pulse: 75 73 70 68   Resp:    18   Temp:  99.5 °F (37.5 °C)     TempSrc:  Oral     SpO2:  95%  (!) 92%   Weight:       Height:        18 0945 18 0949 18 1559   BP:  (!) 122/58 (!) 143/65   Pulse: 67 70 74   Resp: 18 18 18   Temp:  96.6 °F (35.9 °C) 98 °F (36.7 °C)   TempSrc:  Oral Oral   SpO2:  (!) 92% (!) 94%   Weight:      Height:              GENERAL  - Pleasant male with no apparent distress    LOWER EXTREMITY PHYSICAL EXAM    Moderate strike through noted to outer layer of dressing Neurovascular status - vascular status grossly intact.   Retention stitches intact, negative malodor noted  Edema - mild distal amputation stump  Warmth - no increase in skin temp from prox to distally b/l     Radiographs reviewed:  -post op.      SELECTED RESULTS  Recent Labs     10/31/18  1054 18  0533   WBC 7.39 11.65   HGB 13.9* 12.4*   HCT 43.2 39.6*   MCV 91 91    182   LYMPH 32.6  2.4 18.9  2.2   MONO  8.3  0.6 7.3  0.9   EOS 0.0 0.1     Recent Labs     10/31/18  1053 11/01/18  0533    133*   K 4.8 4.5    100   CO2 23 24   BUN 20 24*   ALT 12  --    AST 20  --      Lab Results   Component Value Date     (L) 11/01/2018    K 4.5 11/01/2018     11/01/2018    CO2 24 11/01/2018     Lab Results   Component Value Date    WBC 11.65 11/01/2018    HGB 12.4 (L) 11/01/2018    HCT 39.6 (L) 11/01/2018    MCV 91 11/01/2018     11/01/2018      [unfilled]  Lab Results   Component Value Date    SEDRATE 14 (H) 10/31/2018     Lab Results   Component Value Date    CRP 1.2 10/31/2018     Lab Results   Component Value Date    HGBA1C 8.0 (H) 09/19/2018     ASSESSMENT  Lavelle Ladd is a 63 y.o. male  S/P Left TMA irrigation and debridement, POD #1 with history of non compliance, PVD, uncontrolled DM2.     PLAN  · Wound was flushed and repacked. Dry sterile dressing applied to site. Strict compliance with NWB instructed to patient. Plan for secondary wound closure scheduled Monday. Pt should be NPO midnight of surgery.   · Irrigation and packing performed by podiatry. I will discuss with my colleague Dr. Guthrie who will be on call this weekend.   · Social work- reconsulted. Pt high risk of loss of limb. Strongly recommend LTAC or SNF placement. Pending clinical improvement will need nursing dressing changes, IV antibiotics x 2 weeks, physical therapy. I discussed with patient who agrees with LTAC or SNF placement.       Report Electronically Signed By:    Karla Wheeler DPM   Podiatric Medicine & Surgery  [facility]

## 2018-11-01 NOTE — SUBJECTIVE & OBJECTIVE
Interval History:     11.1.18 - s/p surgery on his left foot y'day, wound closure ,     Review of patient's allergies indicates:No Known Allergies    Current Facility-Administered Medications   Medication Frequency    amLODIPine tablet 2.5 mg Daily    arformoterol nebulizer solution 15 mcg Q12H    aspirin EC tablet 81 mg Daily    budesonide nebulizer solution 0.5 mg Q12H    dextrose 50% injection 12.5 g PRN    dextrose 50% injection 25 g PRN    gabapentin capsule 300 mg TID    glucagon (human recombinant) injection 1 mg PRN    glucose chewable tablet 16 g PRN    glucose chewable tablet 24 g PRN    hydrALAZINE tablet 25 mg Q8H PRN    HYDROcodone-acetaminophen  mg per tablet 1 tablet Q4H PRN    insulin aspart U-100 pen 0-5 Units QID (AC + HS) PRN    metoprolol tartrate (LOPRESSOR) tablet 25 mg BID    nozaseptin (NOZIN) nasal  BID    ondansetron disintegrating tablet 8 mg Q8H PRN    ondansetron injection 4 mg Q8H PRN    piperacillin-tazobactam 4.5 g in dextrose 5 % 100 mL IVPB (ready to mix system) Q8H    predniSONE tablet 5 mg Daily    sodium bicarbonate tablet 2,600 mg BID    sodium chloride 0.9% flush 3 mL PRN    sodium chloride 0.9% flush 3 mL PRN    sodium chloride 0.9% flush 5 mL PRN    tacrolimus capsule 1.5 mg BID    vancomycin (VANCOCIN) 1,250 mg in dextrose 5 % 250 mL IVPB Q24H       Objective:     Vital Signs (Most Recent):  Temp: 96.6 °F (35.9 °C) (11/01/18 0949)  Pulse: 70 (11/01/18 0949)  Resp: 18 (11/01/18 0949)  BP: (!) 122/58 (11/01/18 0949)  SpO2: (!) 92 % (11/01/18 0949)  O2 Device (Oxygen Therapy): room air (11/01/18 0949) Vital Signs (24h Range):  Temp:  [96.6 °F (35.9 °C)-99.8 °F (37.7 °C)] 96.6 °F (35.9 °C)  Pulse:  [62-80] 70  Resp:  [10-23] 18  SpO2:  [91 %-99 %] 92 %  BP: (107-208)/(52-91) 122/58     Weight: 99.8 kg (220 lb 0.3 oz) (10/31/18 1049)  Body mass index is 30.69 kg/m².  Body surface area is 2.24 meters squared.    I/O last 3 completed  shifts:  In: 600 [IV Piggyback:600]  Out: -     Physical Exam   Constitutional: He is oriented to person, place, and time. He appears well-developed and well-nourished. No distress.   HENT:   Head: Normocephalic and atraumatic.   Eyes: Pupils are equal, round, and reactive to light.   Neck: Normal range of motion. Neck supple. No tracheal deviation present. No thyromegaly present.   Cardiovascular: Normal rate, regular rhythm, normal heart sounds and intact distal pulses. Exam reveals no gallop and no friction rub.   No murmur heard.  Pulmonary/Chest: Effort normal and breath sounds normal. He has no wheezes. He has no rales.   Abdominal: Soft. He exhibits no mass. There is no tenderness. There is no rebound and no guarding.   No allograft tenderness    Musculoskeletal: Normal range of motion. He exhibits no edema.   S/p right BKS, Left foot in dressings, s/p left transmetatarsal amputation   Lymphadenopathy:     He has no cervical adenopathy.   Neurological: He is alert and oriented to person, place, and time.   Skin: Skin is warm. No rash noted. He is not diaphoretic. No erythema.   Nursing note and vitals reviewed.      Significant Labs:  CBC:   Recent Labs   Lab 11/01/18  0533   WBC 11.65   RBC 4.35*   HGB 12.4*   HCT 39.6*      MCV 91   MCH 28.5   MCHC 31.3*     CMP:   Recent Labs   Lab 10/31/18  1053 11/01/18  0533   * 193*   CALCIUM 9.5 8.8   ALBUMIN 3.3*  --    PROT 7.2  --     133*   K 4.8 4.5   CO2 23 24    100   BUN 20 24*   CREATININE 1.5* 1.6*   ALKPHOS 86  --    ALT 12  --    AST 20  --    BILITOT 0.6  --      Coagulation:   Recent Labs   Lab 10/31/18  1126   INR 1.0     LFTs:   Recent Labs   Lab 10/31/18  1053   ALT 12   AST 20   ALKPHOS 86   BILITOT 0.6   PROT 7.2   ALBUMIN 3.3*     All labs within the past 24 hours have been reviewed.     Significant Imaging:  Reviewed     Prograf level - 4.7

## 2018-11-02 LAB
ANION GAP SERPL CALC-SCNC: 13 MMOL/L
BASOPHILS # BLD AUTO: 0.02 K/UL
BASOPHILS NFR BLD: 0.3 %
BUN SERPL-MCNC: 26 MG/DL
CALCIUM SERPL-MCNC: 9 MG/DL
CHLORIDE SERPL-SCNC: 99 MMOL/L
CO2 SERPL-SCNC: 21 MMOL/L
CREAT SERPL-MCNC: 1.8 MG/DL
DIFFERENTIAL METHOD: ABNORMAL
EOSINOPHIL # BLD AUTO: 0.1 K/UL
EOSINOPHIL NFR BLD: 1.9 %
ERYTHROCYTE [DISTWIDTH] IN BLOOD BY AUTOMATED COUNT: 16.7 %
EST. GFR  (AFRICAN AMERICAN): 45 ML/MIN/1.73 M^2
EST. GFR  (NON AFRICAN AMERICAN): 39 ML/MIN/1.73 M^2
GLUCOSE SERPL-MCNC: 267 MG/DL
HCT VFR BLD AUTO: 35.9 %
HGB BLD-MCNC: 11.5 G/DL
LYMPHOCYTES # BLD AUTO: 1.8 K/UL
LYMPHOCYTES NFR BLD: 25.8 %
MCH RBC QN AUTO: 29.3 PG
MCHC RBC AUTO-ENTMCNC: 32 G/DL
MCV RBC AUTO: 92 FL
MONOCYTES # BLD AUTO: 0.7 K/UL
MONOCYTES NFR BLD: 10.2 %
NEUTROPHILS # BLD AUTO: 4.2 K/UL
NEUTROPHILS NFR BLD: 61.8 %
PLATELET # BLD AUTO: 155 K/UL
PMV BLD AUTO: 10 FL
POCT GLUCOSE: 237 MG/DL (ref 70–110)
POCT GLUCOSE: 238 MG/DL (ref 70–110)
POCT GLUCOSE: 267 MG/DL (ref 70–110)
POCT GLUCOSE: 280 MG/DL (ref 70–110)
POTASSIUM SERPL-SCNC: 3.8 MMOL/L
RBC # BLD AUTO: 3.92 M/UL
SODIUM SERPL-SCNC: 133 MMOL/L
TACROLIMUS BLD-MCNC: 5.2 NG/ML
WBC # BLD AUTO: 6.86 K/UL

## 2018-11-02 PROCEDURE — 63600175 PHARM REV CODE 636 W HCPCS: Performed by: NURSE PRACTITIONER

## 2018-11-02 PROCEDURE — 99024 POSTOP FOLLOW-UP VISIT: CPT | Mod: ,,, | Performed by: PODIATRIST

## 2018-11-02 PROCEDURE — 99233 SBSQ HOSP IP/OBS HIGH 50: CPT | Mod: ,,, | Performed by: INTERNAL MEDICINE

## 2018-11-02 PROCEDURE — 96372 THER/PROPH/DIAG INJ SC/IM: CPT

## 2018-11-02 PROCEDURE — 80048 BASIC METABOLIC PNL TOTAL CA: CPT

## 2018-11-02 PROCEDURE — 80197 ASSAY OF TACROLIMUS: CPT

## 2018-11-02 PROCEDURE — 36415 COLL VENOUS BLD VENIPUNCTURE: CPT

## 2018-11-02 PROCEDURE — 25000003 PHARM REV CODE 250: Performed by: INTERNAL MEDICINE

## 2018-11-02 PROCEDURE — 94640 AIRWAY INHALATION TREATMENT: CPT

## 2018-11-02 PROCEDURE — 25000242 PHARM REV CODE 250 ALT 637 W/ HCPCS: Performed by: PODIATRIST

## 2018-11-02 PROCEDURE — 25000003 PHARM REV CODE 250: Performed by: NURSE PRACTITIONER

## 2018-11-02 PROCEDURE — 21400001 HC TELEMETRY ROOM

## 2018-11-02 PROCEDURE — 85025 COMPLETE CBC W/AUTO DIFF WBC: CPT

## 2018-11-02 RX ORDER — MORPHINE SULFATE 2 MG/ML
1 INJECTION, SOLUTION INTRAMUSCULAR; INTRAVENOUS EVERY 4 HOURS PRN
Status: DISCONTINUED | OUTPATIENT
Start: 2018-11-02 | End: 2018-11-04

## 2018-11-02 RX ORDER — LINEZOLID 600 MG/1
600 TABLET, FILM COATED ORAL EVERY 12 HOURS
Status: COMPLETED | OUTPATIENT
Start: 2018-11-02 | End: 2018-11-09

## 2018-11-02 RX ADMIN — MORPHINE SULFATE 1 MG: 2 INJECTION, SOLUTION INTRAMUSCULAR; INTRAVENOUS at 11:11

## 2018-11-02 RX ADMIN — TACROLIMUS 1.5 MG: 0.5 CAPSULE ORAL at 09:11

## 2018-11-02 RX ADMIN — AMLODIPINE BESYLATE 2.5 MG: 2.5 TABLET ORAL at 09:11

## 2018-11-02 RX ADMIN — HUMAN INSULIN 10 UNITS: 100 INJECTION, SUSPENSION SUBCUTANEOUS at 05:11

## 2018-11-02 RX ADMIN — MORPHINE SULFATE 1 MG: 2 INJECTION, SOLUTION INTRAMUSCULAR; INTRAVENOUS at 07:11

## 2018-11-02 RX ADMIN — HYDRALAZINE HYDROCHLORIDE 25 MG: 25 TABLET, FILM COATED ORAL at 07:11

## 2018-11-02 RX ADMIN — LINEZOLID 600 MG: 600 TABLET, FILM COATED ORAL at 08:11

## 2018-11-02 RX ADMIN — ASPIRIN 81 MG: 81 TABLET, COATED ORAL at 09:11

## 2018-11-02 RX ADMIN — HYDROCODONE BITARTRATE AND ACETAMINOPHEN 1 TABLET: 10; 325 TABLET ORAL at 04:11

## 2018-11-02 RX ADMIN — HYDROCODONE BITARTRATE AND ACETAMINOPHEN 1 TABLET: 10; 325 TABLET ORAL at 01:11

## 2018-11-02 RX ADMIN — PREDNISONE 5 MG: 5 TABLET ORAL at 09:11

## 2018-11-02 RX ADMIN — PIPERACILLIN SODIUM AND TAZOBACTAM SODIUM 4.5 G: 4; .5 INJECTION, POWDER, LYOPHILIZED, FOR SOLUTION INTRAVENOUS at 11:11

## 2018-11-02 RX ADMIN — TACROLIMUS 1.5 MG: 0.5 CAPSULE ORAL at 05:11

## 2018-11-02 RX ADMIN — BUDESONIDE 0.5 MG: 0.5 SUSPENSION RESPIRATORY (INHALATION) at 07:11

## 2018-11-02 RX ADMIN — ARFORMOTEROL TARTRATE 15 MCG: 15 SOLUTION RESPIRATORY (INHALATION) at 07:11

## 2018-11-02 RX ADMIN — SODIUM BICARBONATE 650 MG TABLET 2600 MG: at 09:11

## 2018-11-02 RX ADMIN — PIPERACILLIN SODIUM AND TAZOBACTAM SODIUM 4.5 G: 4; .5 INJECTION, POWDER, LYOPHILIZED, FOR SOLUTION INTRAVENOUS at 12:11

## 2018-11-02 RX ADMIN — METOPROLOL TARTRATE 25 MG: 25 TABLET ORAL at 09:11

## 2018-11-02 RX ADMIN — HYDROCODONE BITARTRATE AND ACETAMINOPHEN 1 TABLET: 10; 325 TABLET ORAL at 08:11

## 2018-11-02 RX ADMIN — SODIUM BICARBONATE 650 MG TABLET 2600 MG: at 08:11

## 2018-11-02 RX ADMIN — PIPERACILLIN SODIUM AND TAZOBACTAM SODIUM 4.5 G: 4; .5 INJECTION, POWDER, LYOPHILIZED, FOR SOLUTION INTRAVENOUS at 04:11

## 2018-11-02 RX ADMIN — INSULIN ASPART 2 UNITS: 100 INJECTION, SOLUTION INTRAVENOUS; SUBCUTANEOUS at 12:11

## 2018-11-02 RX ADMIN — GABAPENTIN 300 MG: 300 CAPSULE ORAL at 09:11

## 2018-11-02 RX ADMIN — METOPROLOL TARTRATE 25 MG: 25 TABLET ORAL at 08:11

## 2018-11-02 RX ADMIN — GABAPENTIN 300 MG: 300 CAPSULE ORAL at 08:11

## 2018-11-02 RX ADMIN — HYDROCODONE BITARTRATE AND ACETAMINOPHEN 1 TABLET: 10; 325 TABLET ORAL at 09:11

## 2018-11-02 RX ADMIN — PIPERACILLIN SODIUM AND TAZOBACTAM SODIUM 4.5 G: 4; .5 INJECTION, POWDER, LYOPHILIZED, FOR SOLUTION INTRAVENOUS at 09:11

## 2018-11-02 RX ADMIN — HYDROCODONE BITARTRATE AND ACETAMINOPHEN 1 TABLET: 10; 325 TABLET ORAL at 05:11

## 2018-11-02 RX ADMIN — INSULIN ASPART 2 UNITS: 100 INJECTION, SOLUTION INTRAVENOUS; SUBCUTANEOUS at 06:11

## 2018-11-02 RX ADMIN — GABAPENTIN 300 MG: 300 CAPSULE ORAL at 04:11

## 2018-11-02 NOTE — ASSESSMENT & PLAN NOTE
10/31- will continue close glucose control.  Insulin therapy     11/1- will continue insulin sliding scale , continue close follow up

## 2018-11-02 NOTE — ASSESSMENT & PLAN NOTE
- A1c 8.0 in September  -Glucose slightly uncontrolled  - Accu checks ACHS with SSI PRN  - add NPH at lower doses  - Monitor

## 2018-11-02 NOTE — ASSESSMENT & PLAN NOTE
- with delayed wound healing secondary to non-compliance with podiatry recommendations (patient reports getting foot wet and bearing weight)  - Defer care to podiatry  -wound cultures growing Enterobacter and Enterococcus  -scheduled for wound closure on Monday 11/5/18  -Infectious Disease following  -will need LTAC vs SNF

## 2018-11-02 NOTE — PROGRESS NOTES
[facility] -   Nephrology  Progress Note    Patient Name: Lavelle Ladd  MRN: [id number]  Admission Date: 10/31/2018  Hospital Length of Stay: 2 days  Attending Provider: Shelton Edgar MD   Primary Care Physician: Rishabh Esteban MD  Principal Problem:Abscess of left foot, h/o of kidney transplant    Subjective:     HPI: Lavelle Ladd is a pleasant 63 y old  man  , s/p   donor ( brain death ) kidney transplant on 16.  He has CKD stage 3 - GFR 30-59 and his baseline creatinine is between 1.5-1.8. He takes  prednisone 5 mg daily and tacrolimus 1.5 mg twice a day , for maintenance immunosuppression.  He he is also on CellCept 500 mg twice a day, will hold this medication due to cellulitis of his left foot, he is status post left transmetatarsal amputation on 2018, patient was seen for follow-up appointment in the clinic yesterday and notice that his foot was infected, patient was admitted to the hospital for further management,      Interval History: Pt was seen and examined. H/o was reviewed. Pt is a 64 y/o male [place] [facility] [place] being treated for L foot infections after L forefoot ampuation about 3 weeks ago. Wound cx is growing both Enterococcus and Enterobacter. Pt has no new c/o's today, no new issues overnight.    Review of patient's allergies indicates:  No Known Allergies  Current Facility-Administered Medications   Medication Frequency    amLODIPine tablet 2.5 mg Daily    arformoterol nebulizer solution 15 mcg Q12H    aspirin EC tablet 81 mg Daily    budesonide nebulizer solution 0.5 mg Q12H    dextrose 50% injection 12.5 g PRN    dextrose 50% injection 25 g PRN    gabapentin capsule 300 mg TID    glucagon (human recombinant) injection 1 mg PRN    glucose chewable tablet 16 g PRN    glucose chewable tablet 24 g PRN    hydrALAZINE tablet 25 mg Q8H PRN    HYDROcodone-acetaminophen  mg per tablet 1 tablet Q4H PRN     insulin aspart U-100 pen 0-5 Units QID (AC + HS) PRN    insulin NPH injection 10 Units BID AC    linezolid tablet 600 mg Q12H    metoprolol tartrate (LOPRESSOR) tablet 25 mg BID    nozaseptin (NOZIN) nasal  BID    ondansetron disintegrating tablet 8 mg Q8H PRN    ondansetron injection 4 mg Q8H PRN    piperacillin-tazobactam 4.5 g in dextrose 5 % 100 mL IVPB (ready to mix system) Q8H    predniSONE tablet 5 mg Daily    sodium bicarbonate tablet 2,600 mg BID    sodium chloride 0.9% flush 3 mL PRN    sodium chloride 0.9% flush 3 mL PRN    sodium chloride 0.9% flush 5 mL PRN    tacrolimus capsule 1.5 mg BID       Objective:     Vital Signs (Most Recent):  Temp: 97.6 °F (36.4 °C) (11/02/18 1617)  Pulse: 83 (11/02/18 1617)  Resp: 18 (11/02/18 1617)  BP: (!) 169/83 (11/02/18 1617)  SpO2: (!) 94 % (11/02/18 1617)  O2 Device (Oxygen Therapy): room air (11/02/18 1617) Vital Signs (24h Range):  Temp:  [97.6 °F (36.4 °C)-98.4 °F (36.9 °C)] 97.6 °F (36.4 °C)  Pulse:  [65-83] 83  Resp:  [18-20] 18  SpO2:  [92 %-97 %] 94 %  BP: (111-169)/(54-83) 169/83     Weight: 99.8 kg (220 lb 0.3 oz) (10/31/18 1049)  Body mass index is 30.69 kg/m².  Body surface area is 2.24 meters squared.    I/O last 3 completed shifts:  In: 1140 [P.O.:240; IV Piggyback:900]  Out: 725 [Urine:725]    Physical Exam   Constitutional: He is oriented to person, place, and time. He appears well-developed and well-nourished.   HENT:   Head: Normocephalic and atraumatic.   Neck: No JVD present.   Cardiovascular: Normal rate, regular rhythm and normal heart sounds.   Pulmonary/Chest: No respiratory distress. He has no rales.   Abdominal: Soft. He exhibits no distension.   Musculoskeletal: He exhibits no edema.   Neurological: He is oriented to person, place, and time.   Skin: Skin is dry.   Psychiatric: He has a normal mood and affect. His behavior is normal.   Nursing note and vitals reviewed.      Significant Labs: reviewed  BMP  Lab  Results   Component Value Date     (L) 11/02/2018    K 3.8 11/02/2018    CL 99 11/02/2018    CO2 21 (L) 11/02/2018    BUN 26 (H) 11/02/2018    CREATININE 1.8 (H) 11/02/2018    CALCIUM 9.0 11/02/2018    ANIONGAP 13 11/02/2018    ESTGFRAFRICA 45 (A) 11/02/2018    EGFRNONAA 39 (A) 11/02/2018     Lab Results   Component Value Date    WBC 6.86 11/02/2018    HGB 11.5 (L) 11/02/2018    HCT 35.9 (L) 11/02/2018    MCV 92 11/02/2018     11/02/2018     Prograf level 5.2    Significant Imaging: reviewed    Assessment/Plan:   64 y/o male with a h/o of KTx has wound infection after amputation:         Kidney transplant recipient    1. H/o of CKD stage 3.  s Cr is stable and has not worsened from baseline  K normal  Metabolic acidosis, mild  Mild hyponatremia noted     H/o of cadaveric kidney transplant in May 2016  Doing well, stable hemodynamically  On immunosuppressive therapy  Prograf level is within the therapeutic range  No change in dose of prograf  Cellcept is on hold, keep on hold, due to current and active infection    HTN : BP not controlled,   Meds reviewed  Will increase amlodipine dose to 5 mg po qd    H/o of DM  Diabetic nephropathy        * Abscess of left foot    Left foot wound infection post mid foot amputation  Wound cx growing Enterococcus and Enterobacter  Abx reviewed with ID,   Pt on zosyn and was on vancomycin until today  zyvox was added.  Agree with current mgmt.            Plans and recommendations:  As detailed above  Will f/u closely.    Maureen Cherry MD  Nephrology  [facility]

## 2018-11-02 NOTE — ASSESSMENT & PLAN NOTE
Left foot wound infection post mid foot amputation  Wound cx growing Enterococcus and Enterobacter  Abx reviewed with ID,   Pt on zosyn and was on vancomycin until today  zyvox was added.  Agree with current mgmt.

## 2018-11-02 NOTE — PROGRESS NOTES
[facility]  Infectious Disease  Progress Note    Patient Name: Lavelle Ladd  MRN: [id number]  Admission Date: 10/31/2018  Length of Stay: 2 days  Attending Physician: Shelton Edgar MD  Primary Care Provider: Rishabh Esteban MD    Isolation Status: Contact  Assessment/Plan:      Hx of right BKA    11/1- supportive care      S/P transmetatarsal amputation of foot, left    10/31- podiatry follow up , continue close follow up     11/1- will use operative cultures to guide therapy -prelim cultures -GNR, will continue zosyn and stop vanco ,follow ID of GNR      Peripheral vascular disease    11/1-will follow vascular surgery on discharge       Kidney transplant recipient    10/31- nephrology follow up   11/1- close nephrology follow up .     Type 2 diabetes mellitus with hyperglycemia, with long-term current use of insulin    10/31- will continue close glucose control.  Insulin therapy     11/1- will continue insulin sliding scale , continue close follow up     Essential hypertension    11/1- will continue norvasc          Anticipated Disposition:     Thank you for your consult. I will follow-up with patient. Please contact us if you have any additional questions.    Ronny Veliz MD  Infectious Disease  [facility]    Subjective:     Principal Problem:Abscess of left foot    HPI:      63 year old  male  with a PMHx of COPD, CAD, Diastolic CHF, CKD, Renal transplant, GERD, Hepatitis C, HLD, HTN, PVD, and IDDM who presented to the hospital as a direct admit for planned surgery today per Dr. Wheeler.   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  Patient underwent delayed primary wound closure, on 9/24/18.  He presented to podiatry today for his 2 week follow-up of wound closure 2/2 to wound dehiscence.    All previous cultures reviewed -  09/21-    Wound cx on 9/21 showed MSSA  07/2018-  Newer results are available. Click to view  them now.   Component 3mo ago   Aerobic Bacterial Culture CITROBACTER FREUNDII   Rare       Aerobic Bacterial Culture KLEBSIELLA OXYTOCA   Rare           He had on 10/31-   1. Left foot Irrigation and debridement to bone  2. Revisional Transmetatarsal Amputation, Left foot        Interval History: 63 year old man with diabetic foot.  All previous cultures reviewed   10/31- GNR  09/21- MSSA  He does not want LTAC   Review of Systems   Constitutional: Negative for chills, diaphoresis, fatigue and fever.   HENT: Negative for hearing loss, mouth sores, sore throat, tinnitus and trouble swallowing.    Eyes: Negative for pain, discharge and redness.   Respiratory: Negative for apnea, cough, choking, chest tightness, shortness of breath, wheezing and stridor.    Cardiovascular: Negative for chest pain, palpitations and leg swelling.   Gastrointestinal: Negative for abdominal distention, abdominal pain, blood in stool, constipation, diarrhea, nausea, rectal pain and vomiting.   Endocrine: Negative for cold intolerance, heat intolerance, polydipsia, polyphagia and polyuria.   Genitourinary: Negative for difficulty urinating, dysuria, flank pain, frequency, hematuria and urgency.   Musculoskeletal: Negative for arthralgias, back pain, gait problem, joint swelling, neck pain and neck stiffness.   Skin: Positive for color change and wound. Negative for rash.        Left foot with worsening erythema.    Allergic/Immunologic: Negative for food allergies.   Neurological: Negative for dizziness, tremors, seizures, syncope, speech difficulty, light-headedness and headaches.   Hematological: Negative for adenopathy. Does not bruise/bleed easily.   Psychiatric/Behavioral: Negative for agitation and confusion. The patient is not nervous/anxious.    All other systems reviewed and are negative.    Objective:     Vital Signs (Most Recent):  Temp: 97.6 °F (36.4 °C) (11/02/18 0923)  Pulse: 77 (11/02/18 0923)  Resp: 18 (11/02/18 0923)  BP:  (!) 111/54 (11/02/18 0923)  SpO2: 97 % (11/02/18 0923) Vital Signs (24h Range):  Temp:  [97.6 °F (36.4 °C)-98.4 °F (36.9 °C)] 97.6 °F (36.4 °C)  Pulse:  [65-77] 77  Resp:  [18-20] 18  SpO2:  [92 %-97 %] 97 %  BP: (111-157)/(54-70) 111/54     Weight: 99.8 kg (220 lb 0.3 oz)  Body mass index is 30.69 kg/m².    Estimated Creatinine Clearance: 49.2 mL/min (A) (based on SCr of 1.8 mg/dL (H)).    Physical Exam   Constitutional: He is oriented to person, place, and time. He appears well-developed and well-nourished. No distress.   HENT:   Head: Normocephalic and atraumatic.   Mouth/Throat: Oropharynx is clear and moist.   Eyes: Conjunctivae and EOM are normal. Pupils are equal, round, and reactive to light.   Neck: Normal range of motion. Neck supple. No JVD present.   Cardiovascular: Normal rate, regular rhythm, normal heart sounds and intact distal pulses. Exam reveals no gallop and no friction rub.   No murmur heard.  Pulmonary/Chest: Effort normal and breath sounds normal. No stridor. No respiratory distress. He has no wheezes. He has no rales. He exhibits no tenderness.   Abdominal: Soft. Bowel sounds are normal. He exhibits no distension and no mass. There is no tenderness. There is no rebound and no guarding.   Musculoskeletal: Normal range of motion. He exhibits no edema or tenderness.   Neurological: He is alert and oriented to person, place, and time. No cranial nerve deficit.   Skin: Skin is warm and dry. No rash noted. He is not diaphoretic. There is erythema.   LLE dressing CDI.  Pictures in clinic today show + erythema with some sutures still intact.  + malodor per podiatry (see pictures)   Psychiatric: He has a normal mood and affect. His speech is normal. Judgment and thought content normal. He is withdrawn. Cognition and memory are normal. He is inattentive.   Nursing note and vitals reviewed.      Significant Labs:   Blood Culture:   Recent Labs   Lab 09/18/18  1812 09/21/18  0907 09/21/18  1518  10/31/18  1054 10/31/18  1055   [facility] Gram stain aer bottle: Gram positive cocci in clusters resembling Staph   Gram stain susan bottle: Gram positive cocci in clusters resembling Staph   Results called to and read back by:NOA Eckert RN 09/19/2018  15:31  STAPHYLOCOCCUS AUREUS  ID consult required at [facility] and [facility] locations.  For susceptibility see order # [id number]   No growth after 5 days. No growth after 5 days. No Growth to date  No Growth to date No Growth to date  No Growth to date     BMP:   Recent Labs   Lab 10/31/18  1052  11/02/18  0450   GLU  --    < > 267*   NA  --    < > 133*   K  --    < > 3.8   CL  --    < > 99   CO2  --    < > 21*   BUN  --    < > 26*   CREATININE  --    < > 1.8*   CALCIUM  --    < > 9.0   MG 1.9  --   --     < > = values in this interval not displayed.     CBC:   Recent Labs   Lab 10/31/18  1054 11/01/18  0533 11/02/18  0450   WBC 7.39 11.65 6.86   HGB 13.9* 12.4* 11.5*   HCT 43.2 39.6* 35.9*    182 155     CMP:   Recent Labs   Lab 10/31/18  1053 11/01/18  0533 11/02/18  0450    133* 133*   K 4.8 4.5 3.8    100 99   CO2 23 24 21*   * 193* 267*   BUN 20 24* 26*   CREATININE 1.5* 1.6* 1.8*   CALCIUM 9.5 8.8 9.0   PROT 7.2  --   --    ALBUMIN 3.3*  --   --    BILITOT 0.6  --   --    ALKPHOS 86  --   --    AST 20  --   --    ALT 12  --   --    ANIONGAP 12 9 13   EGFRNONAA 48* 45* 39*     Wound Culture:   Recent Labs   Lab 06/12/18  0912 07/09/18  0856 09/21/18  1235 10/18/18  1328 10/31/18  0911   LABAERO Skin skylar,  no predominant organism CITROBACTER FREUNDII  Rare    KLEBSIELLA OXYTOCA  Rare   STAPHYLOCOCCUS AUREUS  Few  Skin skylar also present   Skin skylar,  no predominant organism GRAM NEGATIVE MIGUEL ANGEL  Many  Identification and susceptibility pending         Significant Imaging: I have reviewed all pertinent imaging results/findings within the past 24 hours.

## 2018-11-02 NOTE — PLAN OF CARE
Problem: Patient Care Overview  Goal: Plan of Care Review  Outcome: Ongoing (interventions implemented as appropriate)  Patient remained free from injury.c/o left LE pain.PRN pain meds administered as prescribed. Calm. Watching TV. No distress noted. Oriented x3. Respirations even and non labored. IV patent and infusing. Bed locked and low. LLE elevated on pillows.  Call light in reach. Safety measures in place. Will continue to monitor. Reviewed plan of care. Patient verbalized understanding and teach back.    12hr chart check complete.

## 2018-11-02 NOTE — PLAN OF CARE
Problem: Patient Care Overview  Goal: Plan of Care Review  Outcome: Ongoing (interventions implemented as appropriate)  Patient remained free from injury. C/o back pain. PRN pain meds administered as prescribed. . Calm. Watching TV. No distress noted.left LE elevated on pullow. Oriented x3. Respirations even and non labored. IV infusing patent and infusing. Bed locked and low. Call light in reach. Safety measures in place. Will continue to monitor. Reviewed plan of care. Patient verbalized understanding and teach back.    12hr chart check complete.

## 2018-11-02 NOTE — ASSESSMENT & PLAN NOTE
10/31- podiatry follow up , continue close follow up     11/1- will use operative cultures to guide therapy -prelim cultures -GNR, will continue zosyn and stop vanco ,follow ID of GNR

## 2018-11-02 NOTE — PROGRESS NOTES
[facility] Hospital Medicine  Progress Note    Patient Name: Lavelle Ladd  MRN: [id number]  Patient Class: IP- Inpatient   Admission Date: 10/31/2018  Length of Stay: 2 days  Attending Physician: Shelton Edgar MD  Primary Care Provider: Rishabh Esteban MD    Subjective:     Principal Problem:Abscess of left foot    HPI:  Lavelle Ladd is a 63 y.o. male  with a PMHx of COPD, CAD, Diastolic CHF, CKD, Renal transplant, GERD, Hepatitis C, HLD, HTN, PVD, and IDDM who presented to the hospital today as a direct admit for planned surgery today per Dr. Wheeler.  Left foot xray today showed amputation of the distal aspect of the left foot at the level of the metatarsal bones.  No lytic abnormalities to suggest osteomyelitis by plain radiograph.  No evidence of acute fracture or dislocation.  Bony mineralization is normal.  Diffuse soft tissue and dense vascular calcifications.  Large plantar calcaneal spur.  He is s/p left transmetatarsal amputation secondary to gangrene of multiple digits and JOSE R.  Patient underwent delayed primary wound closure, on 9/24/18.  He presented to podiatry today for his 2 week follow-up of wound closure 2/2 to wound dehiscence.  Patient missed his last postoperative visit as he states he was unable to get a ride to his appointment.  He also admits to ambulating on his amputation site.  He drove himself to clinic today as he was unable to get a ride.  He states that he has gotten his foot wet as he has taken showers covering extremity in a garbage bag.  He denies any fever, chills, foot pain, CP, SOB, N/V/D, and all other symptoms at this time.  Patient has a history of poor compliance and difficult living dispo as he lives alone.  In the past we did try to get patient admitted to skilled nursing facility, but he has refused such services.   Wound cx on 9/21 showed MSSA for which he has completed IV cefazolin for 14 days secondary to MSSA gangrene.  He has been NPO  since midnight.  He will be admitted to the Medicine unit under the care of [facility].  ID consulted per podiatry recs to guide antibiotic therapy postoperatively.  He is a Full [employer].  His SDM is his sister Kecia Williamson who can be reached at [phone].                Hospital Course:  11/1/18 The patient is S/P I&D of left foot yesterday. No acute issues overnight. The patient reports that his pain is well controlled. Continue IV ABX, cultures pending. Infectious disease is following.     11/2/18 wound culture growing Enterococcus and Enterobacter. Podiatry recommends SNF vs LTAC placement due to potential for limb loss. Continue Zosyn for now.     Interval History: Patient is without complaints. Did not talk much today. Reinforced NPO past midnight on Sunday for wound surgery.     Review of Systems   Constitutional: Negative for chills, diaphoresis, fatigue and fever.   HENT: Negative for hearing loss, mouth sores, sore throat, tinnitus and trouble swallowing.    Eyes: Negative for pain, discharge and redness.   Respiratory: Negative for apnea, cough, choking, chest tightness, shortness of breath, wheezing and stridor.    Cardiovascular: Negative for chest pain, palpitations and leg swelling.   Gastrointestinal: Negative for abdominal distention, abdominal pain, blood in stool, constipation, diarrhea, nausea, rectal pain and vomiting.   Endocrine: Negative for cold intolerance, heat intolerance, polydipsia, polyphagia and polyuria.   Genitourinary: Negative for difficulty urinating, dysuria, flank pain, frequency, hematuria and urgency.   Musculoskeletal: Negative for arthralgias, back pain, gait problem, joint swelling, neck pain and neck stiffness.   Skin: Positive for color change and wound. Negative for rash.   Allergic/Immunologic: Negative for food allergies.   Neurological: Negative for dizziness, tremors, seizures, syncope, speech difficulty, light-headedness and headaches.   Hematological:  Negative for adenopathy. Does not bruise/bleed easily.   Psychiatric/Behavioral: Negative for agitation and confusion. The patient is not nervous/anxious.    All other systems reviewed and are negative.    Objective:     Vital Signs (Most Recent):  Temp: 97.6 °F (36.4 °C) (11/02/18 0923)  Pulse: 77 (11/02/18 0923)  Resp: 18 (11/02/18 0923)  BP: (!) 111/54 (11/02/18 0923)  SpO2: 97 % (11/02/18 0923) Vital Signs (24h Range):  Temp:  [97.6 °F (36.4 °C)-98.4 °F (36.9 °C)] 97.6 °F (36.4 °C)  Pulse:  [65-77] 77  Resp:  [18-20] 18  SpO2:  [92 %-97 %] 97 %  BP: (111-157)/(54-70) 111/54     Weight: 99.8 kg (220 lb 0.3 oz)  Body mass index is 30.69 kg/m².    Intake/Output Summary (Last 24 hours) at 11/2/2018 1224  Last data filed at 11/2/2018 0850  Gross per 24 hour   Intake 810 ml   Output 725 ml   Net 85 ml      Physical Exam   Constitutional: He is oriented to person, place, and time. He appears well-developed and well-nourished. No distress.   HENT:   Head: Normocephalic and atraumatic.   Mouth/Throat: Oropharynx is clear and moist.   Eyes: Conjunctivae and EOM are normal. Pupils are equal, round, and reactive to light.   Neck: Normal range of motion. Neck supple. No JVD present.   Cardiovascular: Normal rate, regular rhythm, normal heart sounds and intact distal pulses. Exam reveals no gallop and no friction rub.   No murmur heard.  Pulmonary/Chest: Effort normal and breath sounds normal. No stridor. No respiratory distress. He has no wheezes. He has no rales. He exhibits no tenderness.   Abdominal: Soft. Bowel sounds are normal. He exhibits no distension and no mass. There is no tenderness. There is no rebound and no guarding.   Musculoskeletal: Normal range of motion. He exhibits no edema or tenderness.   Neurological: He is alert and oriented to person, place, and time. No cranial nerve deficit.   Skin: Skin is warm and dry. No rash noted. He is not diaphoretic. No erythema.   LLE dressing CDI.      Psychiatric: He  has a normal mood and affect. His speech is normal. Judgment and thought content normal. He is withdrawn. Cognition and memory are normal. He is inattentive.   Nursing note and vitals reviewed.    Significant Labs:   CBC:   Recent Labs   Lab 11/01/18 0533 11/02/18  0450   WBC 11.65 6.86   HGB 12.4* 11.5*   HCT 39.6* 35.9*    155     CMP:   Recent Labs   Lab 11/01/18 0533 11/02/18  0450   * 133*   K 4.5 3.8    99   CO2 24 21*   * 267*   BUN 24* 26*   CREATININE 1.6* 1.8*   CALCIUM 8.8 9.0   ANIONGAP 9 13   EGFRNONAA 45* 39*     Aerobic culture   Order: [id number]   Status:  Preliminary result   Visible to patient:  No (Not Released)   Next appt:  None   Specimen Information: Foot, Left; Incision site        Component 2d ago   Aerobic Bacterial Culture ENTEROCOCCUS FAECIUM   Rare   Susceptibility pending  P      Aerobic Bacterial Culture ENTEROBACTER CLOACAE COMPLEX   Rare   Susceptibility pending                   Significant Imaging:   EXAMINATION:  XR FOOT 2 VIEW LEFT    CLINICAL HISTORY:  post op;.  Cutaneous abscess of left foot    TECHNIQUE:  AP, lateral and oblique views of the left foot were performed.    COMPARISON:  10/31/2018 10:59 a.m.    FINDINGS:  Again noted is mid metatarsal amputation of the forefoot.  Interval removal of this skin staples.  Mild soft tissue irregularity distally.  Bones appear generally osteopenic.    No appreciable osseous erosion to suggest osteomyelitis.  If there is clinical concern for osteomyelitis, consider MRI of the left foot with IV contrast.    Atherosclerotic calcifications again noted.  Plantar calcaneal spur.    Assessment/Plan:      * Abscess of left foot    See plan for s/p transmetatarsal amputation of foot         Hx of right BKA    - Site is intact       Non compliance with medical treatment    - Educated on the importance of adhering to medical treatment plan  - Patient has a limited support system  - SW consult for DC planning       S/P  transmetatarsal amputation of foot, left    - with delayed wound healing secondary to non-compliance with podiatry recommendations (patient reports getting foot wet and bearing weight)  - Defer care to podiatry  -wound cultures growing Enterobacter and Enterococcus  -scheduled for wound closure on Monday 11/5/18  -Infectious Disease following  -will need LTAC vs SNF     Cellulitis of left foot    - See plan as per above       Peripheral vascular disease    - Resume home ASA tomorrow  - Vascular f/u as needed       Chronic bilateral low back pain with left-sided sciatica    - Resume home Norco PRN  - Monitor       Kidney transplant recipient    - patient is noted to be a poor historian and does not know what medications he takes at home  - per d/w Edmundo Hernandez today, he is currently prescribed Prednisone 5 mg daily, Cellcept 500 mg BID, and Prograf 1.5 mg BID  - Nephrology consulted  - Continue home Prednisone and Prograf for now  - Hold Cellcept per Dr. Staley  - Monitor Prograf levels   - Followed by Dr. Blanco in [place]        Chronic kidney disease (CKD), stage III (moderate)    -Creatinine 1.8 today. Slight increase. Closely monitor  - Daily BMP  - Monitor       Chronic obstructive pulmonary disease    - Currently appears compensated  - Continue home meds  - Supplemental O2 PRN  - Monitor       Coronary artery disease involving native coronary artery of native heart without angina pectoris    -Continue Cardiac medications  -Cardiac monitoring       Type 2 diabetes mellitus with hyperglycemia, with long-term current use of insulin    - A1c 8.0 in September  -Glucose slightly uncontrolled  - Accu checks ACHS with SSI PRN  - add NPH at lower doses  - Monitor       Essential hypertension    - BP well controlled  - Continue home Norvasc and Metoprolol  - Monitor         VTE Risk Mitigation (From admission, onward)        Ordered     IP VTE HIGH RISK PATIENT  Once      10/31/18 1731     Place sequential compression  device  Until discontinued      10/31/18 1108              Albino Paredes NP  Department of Hospital Medicine   [facility]

## 2018-11-02 NOTE — SUBJECTIVE & OBJECTIVE
Interval History: Pt was seen and examined. H/o was reviewed. Pt is a 64 y/o male with a h/o of CKTx in may 2016 at [facility]- who is being treated for L foot infections after L forefoot ampuation about 3 weeks ago. Wound cx is growing both Enterococcus and Enterobacter. Pt has no new c/o's today, no new issues overnight.    Review of patient's allergies indicates:  No Known Allergies  Current Facility-Administered Medications   Medication Frequency    amLODIPine tablet 2.5 mg Daily    arformoterol nebulizer solution 15 mcg Q12H    aspirin EC tablet 81 mg Daily    budesonide nebulizer solution 0.5 mg Q12H    dextrose 50% injection 12.5 g PRN    dextrose 50% injection 25 g PRN    gabapentin capsule 300 mg TID    glucagon (human recombinant) injection 1 mg PRN    glucose chewable tablet 16 g PRN    glucose chewable tablet 24 g PRN    hydrALAZINE tablet 25 mg Q8H PRN    HYDROcodone-acetaminophen  mg per tablet 1 tablet Q4H PRN    insulin aspart U-100 pen 0-5 Units QID (AC + HS) PRN    insulin NPH injection 10 Units BID AC    linezolid tablet 600 mg Q12H    metoprolol tartrate (LOPRESSOR) tablet 25 mg BID    nozaseptin (NOZIN) nasal  BID    ondansetron disintegrating tablet 8 mg Q8H PRN    ondansetron injection 4 mg Q8H PRN    piperacillin-tazobactam 4.5 g in dextrose 5 % 100 mL IVPB (ready to mix system) Q8H    predniSONE tablet 5 mg Daily    sodium bicarbonate tablet 2,600 mg BID    sodium chloride 0.9% flush 3 mL PRN    sodium chloride 0.9% flush 3 mL PRN    sodium chloride 0.9% flush 5 mL PRN    tacrolimus capsule 1.5 mg BID       Objective:     Vital Signs (Most Recent):  Temp: 97.6 °F (36.4 °C) (11/02/18 1617)  Pulse: 83 (11/02/18 1617)  Resp: 18 (11/02/18 1617)  BP: (!) 169/83 (11/02/18 1617)  SpO2: (!) 94 % (11/02/18 1617)  O2 Device (Oxygen Therapy): room air (11/02/18 1617) Vital Signs (24h Range):  Temp:  [97.6 °F (36.4 °C)-98.4 °F (36.9 °C)] 97.6 °F (36.4 °C)  Pulse:   [65-83] 83  Resp:  [18-20] 18  SpO2:  [92 %-97 %] 94 %  BP: (111-169)/(54-83) 169/83     Weight: 99.8 kg (220 lb 0.3 oz) (10/31/18 1049)  Body mass index is 30.69 kg/m².  Body surface area is 2.24 meters squared.    I/O last 3 completed shifts:  In: 1140 [P.O.:240; IV Piggyback:900]  Out: 725 [Urine:725]    Physical Exam   Constitutional: He is oriented to person, place, and time. He appears well-developed and well-nourished.   HENT:   Head: Normocephalic and atraumatic.   Neck: No JVD present.   Cardiovascular: Normal rate, regular rhythm and normal heart sounds.   Pulmonary/Chest: No respiratory distress. He has no rales.   Abdominal: Soft. He exhibits no distension.   Musculoskeletal: He exhibits no edema.   Neurological: He is oriented to person, place, and time.   Skin: Skin is dry.   Psychiatric: He has a normal mood and affect. His behavior is normal.   Nursing note and vitals reviewed.      Significant Labs: reviewed  BMP  Lab Results   Component Value Date     (L) 11/02/2018    K 3.8 11/02/2018    CL 99 11/02/2018    CO2 21 (L) 11/02/2018    BUN 26 (H) 11/02/2018    CREATININE 1.8 (H) 11/02/2018    CALCIUM 9.0 11/02/2018    ANIONGAP 13 11/02/2018    ESTGFRAFRICA 45 (A) 11/02/2018    EGFRNONAA 39 (A) 11/02/2018     Lab Results   Component Value Date    WBC 6.86 11/02/2018    HGB 11.5 (L) 11/02/2018    HCT 35.9 (L) 11/02/2018    MCV 92 11/02/2018     11/02/2018     Prograf level 5.2    Significant Imaging: reviewed

## 2018-11-03 LAB
ANION GAP SERPL CALC-SCNC: 12 MMOL/L
BACTERIA SPEC AEROBE CULT: NORMAL
BACTERIA SPEC AEROBE CULT: NORMAL
BASOPHILS # BLD AUTO: 0.01 K/UL
BASOPHILS NFR BLD: 0.1 %
BUN SERPL-MCNC: 21 MG/DL
CALCIUM SERPL-MCNC: 9.4 MG/DL
CHLORIDE SERPL-SCNC: 101 MMOL/L
CO2 SERPL-SCNC: 22 MMOL/L
CREAT SERPL-MCNC: 1.5 MG/DL
DIFFERENTIAL METHOD: ABNORMAL
EOSINOPHIL # BLD AUTO: 0.2 K/UL
EOSINOPHIL NFR BLD: 1.8 %
ERYTHROCYTE [DISTWIDTH] IN BLOOD BY AUTOMATED COUNT: 16.6 %
EST. GFR  (AFRICAN AMERICAN): 56 ML/MIN/1.73 M^2
EST. GFR  (NON AFRICAN AMERICAN): 48 ML/MIN/1.73 M^2
GLUCOSE SERPL-MCNC: 132 MG/DL
HCT VFR BLD AUTO: 37.7 %
HGB BLD-MCNC: 11.9 G/DL
LYMPHOCYTES # BLD AUTO: 2 K/UL
LYMPHOCYTES NFR BLD: 23.7 %
MCH RBC QN AUTO: 28.9 PG
MCHC RBC AUTO-ENTMCNC: 31.6 G/DL
MCV RBC AUTO: 92 FL
MONOCYTES # BLD AUTO: 0.8 K/UL
MONOCYTES NFR BLD: 9.8 %
NEUTROPHILS # BLD AUTO: 5.4 K/UL
NEUTROPHILS NFR BLD: 64.6 %
PLATELET # BLD AUTO: 176 K/UL
PMV BLD AUTO: 9.9 FL
POCT GLUCOSE: 150 MG/DL (ref 70–110)
POCT GLUCOSE: 265 MG/DL (ref 70–110)
POCT GLUCOSE: 325 MG/DL (ref 70–110)
POTASSIUM SERPL-SCNC: 3.9 MMOL/L
RBC # BLD AUTO: 4.12 M/UL
SODIUM SERPL-SCNC: 135 MMOL/L
TACROLIMUS BLD-MCNC: 5.2 NG/ML
WBC # BLD AUTO: 8.43 K/UL

## 2018-11-03 PROCEDURE — 63600175 PHARM REV CODE 636 W HCPCS: Performed by: NURSE PRACTITIONER

## 2018-11-03 PROCEDURE — 25000003 PHARM REV CODE 250: Performed by: INTERNAL MEDICINE

## 2018-11-03 PROCEDURE — 21400001 HC TELEMETRY ROOM

## 2018-11-03 PROCEDURE — 85025 COMPLETE CBC W/AUTO DIFF WBC: CPT

## 2018-11-03 PROCEDURE — 94640 AIRWAY INHALATION TREATMENT: CPT

## 2018-11-03 PROCEDURE — 80197 ASSAY OF TACROLIMUS: CPT

## 2018-11-03 PROCEDURE — 36415 COLL VENOUS BLD VENIPUNCTURE: CPT

## 2018-11-03 PROCEDURE — 99900035 HC TECH TIME PER 15 MIN (STAT)

## 2018-11-03 PROCEDURE — 25000003 PHARM REV CODE 250: Performed by: NURSE PRACTITIONER

## 2018-11-03 PROCEDURE — 99233 SBSQ HOSP IP/OBS HIGH 50: CPT | Mod: ,,, | Performed by: INTERNAL MEDICINE

## 2018-11-03 PROCEDURE — 25000242 PHARM REV CODE 250 ALT 637 W/ HCPCS: Performed by: PODIATRIST

## 2018-11-03 PROCEDURE — 80048 BASIC METABOLIC PNL TOTAL CA: CPT

## 2018-11-03 RX ORDER — CLONIDINE HYDROCHLORIDE 0.2 MG/1
0.2 TABLET ORAL ONCE
Status: COMPLETED | OUTPATIENT
Start: 2018-11-03 | End: 2018-11-03

## 2018-11-03 RX ORDER — AMLODIPINE BESYLATE 5 MG/1
5 TABLET ORAL DAILY
Status: DISCONTINUED | OUTPATIENT
Start: 2018-11-03 | End: 2018-11-04

## 2018-11-03 RX ADMIN — HUMAN INSULIN 20 UNITS: 100 INJECTION, SUSPENSION SUBCUTANEOUS at 05:11

## 2018-11-03 RX ADMIN — METOPROLOL TARTRATE 25 MG: 25 TABLET ORAL at 09:11

## 2018-11-03 RX ADMIN — BUDESONIDE 0.5 MG: 0.5 SUSPENSION RESPIRATORY (INHALATION) at 07:11

## 2018-11-03 RX ADMIN — PIPERACILLIN SODIUM AND TAZOBACTAM SODIUM 4.5 G: 4; .5 INJECTION, POWDER, LYOPHILIZED, FOR SOLUTION INTRAVENOUS at 09:11

## 2018-11-03 RX ADMIN — HYDROCODONE BITARTRATE AND ACETAMINOPHEN 1 TABLET: 10; 325 TABLET ORAL at 12:11

## 2018-11-03 RX ADMIN — GABAPENTIN 300 MG: 300 CAPSULE ORAL at 04:11

## 2018-11-03 RX ADMIN — GABAPENTIN 300 MG: 300 CAPSULE ORAL at 09:11

## 2018-11-03 RX ADMIN — SODIUM BICARBONATE 650 MG TABLET 2600 MG: at 10:11

## 2018-11-03 RX ADMIN — LINEZOLID 600 MG: 600 TABLET, FILM COATED ORAL at 09:11

## 2018-11-03 RX ADMIN — LINEZOLID 600 MG: 600 TABLET, FILM COATED ORAL at 10:11

## 2018-11-03 RX ADMIN — INSULIN ASPART 4 UNITS: 100 INJECTION, SOLUTION INTRAVENOUS; SUBCUTANEOUS at 02:11

## 2018-11-03 RX ADMIN — INSULIN ASPART 1 UNITS: 100 INJECTION, SOLUTION INTRAVENOUS; SUBCUTANEOUS at 09:11

## 2018-11-03 RX ADMIN — METOPROLOL TARTRATE 25 MG: 25 TABLET ORAL at 10:11

## 2018-11-03 RX ADMIN — SODIUM BICARBONATE 650 MG TABLET 2600 MG: at 09:11

## 2018-11-03 RX ADMIN — ARFORMOTEROL TARTRATE 15 MCG: 15 SOLUTION RESPIRATORY (INHALATION) at 07:11

## 2018-11-03 RX ADMIN — PREDNISONE 5 MG: 5 TABLET ORAL at 10:11

## 2018-11-03 RX ADMIN — ASPIRIN 81 MG: 81 TABLET, COATED ORAL at 10:11

## 2018-11-03 RX ADMIN — PIPERACILLIN SODIUM AND TAZOBACTAM SODIUM 4.5 G: 4; .5 INJECTION, POWDER, LYOPHILIZED, FOR SOLUTION INTRAVENOUS at 05:11

## 2018-11-03 RX ADMIN — TACROLIMUS 1.5 MG: 0.5 CAPSULE ORAL at 09:11

## 2018-11-03 RX ADMIN — TACROLIMUS 1.5 MG: 0.5 CAPSULE ORAL at 05:11

## 2018-11-03 RX ADMIN — HYDROCODONE BITARTRATE AND ACETAMINOPHEN 1 TABLET: 10; 325 TABLET ORAL at 07:11

## 2018-11-03 RX ADMIN — AMLODIPINE BESYLATE 5 MG: 5 TABLET ORAL at 10:11

## 2018-11-03 RX ADMIN — GABAPENTIN 300 MG: 300 CAPSULE ORAL at 10:11

## 2018-11-03 RX ADMIN — CLONIDINE HYDROCHLORIDE 0.2 MG: 0.2 TABLET ORAL at 09:11

## 2018-11-03 RX ADMIN — HYDRALAZINE HYDROCHLORIDE 25 MG: 25 TABLET, FILM COATED ORAL at 07:11

## 2018-11-03 NOTE — PROGRESS NOTES
[facility]  Infectious Disease  Progress Note    Patient Name: Lavelle Ladd  MRN: [id number]  Admission Date: 10/31/2018  Length of Stay: 3 days  Attending Physician: Shelton Edgar MD  Primary Care Provider: Rishabh Esteban MD    Isolation Status: Contact  Assessment/Plan:      S/P transmetatarsal amputation of foot, left    10/31- podiatry follow up , continue close follow up     11/1- will use operative cultures to guide therapy -prelim cultures -GNR, will continue zosyn and stop vanco ,follow ID of GNR     11/2- will follow pathology report and final cultures -prelim cultures- enterobacter /enterococcus-will add zyvox for possible VRE to zosyn.  Will adjust therapy with final drug susceptibility      Kidney transplant recipient    10/31- nephrology follow up   11/1- close nephrology follow up .    11/2- will continue to closely monitor , nephrology follow up      Type 2 diabetes mellitus with hyperglycemia, with long-term current use of insulin    10/31- will continue close glucose control.  Insulin therapy     11/1- will continue insulin sliding scale , continue close follow up    11/20 insulin sliding scale as per primary team      Essential hypertension    11/1- will continue norvasc     11/2- will continue Norvasc , closely monitor BP         Anticipated Disposition:     Thank you for your consult. I will follow-up with patient. Please contact us if you have any additional questions.    Ronny Veliz MD  Infectious Disease  [facility]    Subjective:     Principal Problem:Abscess of left foot    HPI:      63 year old  male  with a PMHx of COPD, CAD, Diastolic CHF, CKD, Renal transplant, GERD, Hepatitis C, HLD, HTN, PVD, and IDDM who presented to the hospital as a direct admit for planned surgery today per Dr. Wheeler.   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  Patient underwent delayed primary wound closure, on  9/24/18.  He presented to podiatry today for his 2 week follow-up of wound closure 2/2 to wound dehiscence.    All previous cultures reviewed -  09/21-    Wound cx on 9/21 showed MSSA  07/2018-  Newer results are available. Click to view them now.   Component 3mo ago   Aerobic Bacterial Culture CITROBACTER FREUNDII   Rare       Aerobic Bacterial Culture KLEBSIELLA OXYTOCA   Rare           He had on 10/31-   1. Left foot Irrigation and debridement to bone  2. Revisional Transmetatarsal Amputation, Left foot        Interval History: 63 year old man with diabetic foot.  All previous cultures reviewed   10/31- GNR  09/21- MSSA  He does not want LTAC   11/2- latest wound cultures -enterococcus/enterobacter   He is afebrile   Review of Systems   Constitutional: Negative for chills, diaphoresis, fatigue and fever.   HENT: Negative for hearing loss, mouth sores, sore throat, tinnitus and trouble swallowing.    Eyes: Negative for pain, discharge and redness.   Respiratory: Negative for apnea, cough, choking, chest tightness, shortness of breath, wheezing and stridor.    Cardiovascular: Negative for chest pain, palpitations and leg swelling.   Gastrointestinal: Negative for abdominal distention, abdominal pain, blood in stool, constipation, diarrhea, nausea, rectal pain and vomiting.   Endocrine: Negative for cold intolerance, heat intolerance, polydipsia, polyphagia and polyuria.   Genitourinary: Negative for difficulty urinating, dysuria, flank pain, frequency, hematuria and urgency.   Musculoskeletal: Negative for arthralgias, back pain, gait problem, joint swelling, neck pain and neck stiffness.   Skin: Positive for color change and wound. Negative for rash.        Left foot with worsening erythema.    Allergic/Immunologic: Negative for food allergies.   Neurological: Negative for dizziness, tremors, seizures, syncope, speech difficulty, light-headedness and headaches.   Hematological: Negative for adenopathy. Does not  bruise/bleed easily.   Psychiatric/Behavioral: Negative for agitation and confusion. The patient is not nervous/anxious.    All other systems reviewed and are negative.    Objective:     Vital Signs (Most Recent):  Temp: 98.5 °F (36.9 °C) (11/03/18 0400)  Pulse: 79 (11/03/18 0715)  Resp: 18 (11/03/18 0715)  BP: (!) 164/79 (11/03/18 0400)  SpO2: (!) 94 % (11/03/18 0715) Vital Signs (24h Range):  Temp:  [97.6 °F (36.4 °C)-98.6 °F (37 °C)] 98.5 °F (36.9 °C)  Pulse:  [68-94] 79  Resp:  [18-20] 18  SpO2:  [92 %-97 %] 94 %  BP: (111-175)/(54-95) 164/79     Weight: 99.8 kg (220 lb 0.3 oz)  Body mass index is 30.69 kg/m².    Estimated Creatinine Clearance: 59.1 mL/min (A) (based on SCr of 1.5 mg/dL (H)).    Physical Exam   Constitutional: He is oriented to person, place, and time. He appears well-developed and well-nourished. No distress.   HENT:   Head: Normocephalic and atraumatic.   Mouth/Throat: Oropharynx is clear and moist.   Eyes: Conjunctivae and EOM are normal. Pupils are equal, round, and reactive to light.   Neck: Normal range of motion. Neck supple. No JVD present.   Cardiovascular: Normal rate, regular rhythm, normal heart sounds and intact distal pulses. Exam reveals no gallop and no friction rub.   No murmur heard.  Pulmonary/Chest: Effort normal and breath sounds normal. No stridor. No respiratory distress. He has no wheezes. He has no rales. He exhibits no tenderness.   Abdominal: Soft. Bowel sounds are normal. He exhibits no distension and no mass. There is no tenderness. There is no rebound and no guarding.   Musculoskeletal: Normal range of motion. He exhibits no edema or tenderness.   Dressing over foot noted   Neurological: He is alert and oriented to person, place, and time. No cranial nerve deficit.   Skin: Skin is warm and dry. No rash noted. He is not diaphoretic. There is erythema.   -   Psychiatric: He has a normal mood and affect. His speech is normal. Judgment and thought content normal. He is  withdrawn. Cognition and memory are normal. He is inattentive.   Nursing note and vitals reviewed.      Significant Labs:   Blood Culture:   Recent Labs   Lab 09/18/18  1812 09/21/18  0907 09/21/18  1516 10/31/18  1054 10/31/18  1055   LABBLOO Gram stain aer bottle: Gram positive cocci in clusters resembling Staph   Gram stain susan bottle: Gram positive cocci in clusters resembling Staph   Results called to and read back by:NOA Eckert RN 09/19/2018  15:31  STAPHYLOCOCCUS AUREUS  ID consult required at [facility].[place] and [place] locations.  For susceptibility see order # [id number]   No growth after 5 days. No growth after 5 days. No Growth to date  No Growth to date  No Growth to date No Growth to date  No Growth to date  No Growth to date     BMP:   Recent Labs   Lab 11/03/18  0508   *   *   K 3.9      CO2 22*   BUN 21   CREATININE 1.5*   CALCIUM 9.4     CBC:   Recent Labs   Lab 11/02/18  0450 11/03/18  0508   WBC 6.86 8.43   HGB 11.5* 11.9*   HCT 35.9* 37.7*    176     CMP:   Recent Labs   Lab 11/02/18  0450 11/03/18  0508   * 135*   K 3.8 3.9   CL 99 101   CO2 21* 22*   * 132*   BUN 26* 21   CREATININE 1.8* 1.5*   CALCIUM 9.0 9.4   ANIONGAP 13 12   EGFRNONAA 39* 48*     Wound Culture:   Recent Labs   Lab 07/09/18  0856 09/21/18  1235 10/18/18  1328 10/31/18  0911 10/31/18  1640   LABAERO CITROBACTER FREUNDII  Rare    KLEBSIELLA OXYTOCA  Rare   STAPHYLOCOCCUS AUREUS  Few  Skin skylar also present   Skin skylar,  no predominant organism GRAM NEGATIVE MIGUEL ANGEL  Many  Identification and susceptibility pending   ENTEROCOCCUS FAECIUM  Rare  Susceptibility pending    ENTEROBACTER CLOACAE COMPLEX  Rare  Susceptibility pending         Significant Imaging: I have reviewed all pertinent imaging results/findings within the past 24 hours.

## 2018-11-03 NOTE — SUBJECTIVE & OBJECTIVE
Interval History: 63 year old man with diabetic foot.  All previous cultures reviewed   10/31- GNR  09/21- MSSA  He does not want LTAC   11/2- latest wound cultures -enterococcus/enterobacter   He is afebrile   Review of Systems   Constitutional: Negative for chills, diaphoresis, fatigue and fever.   HENT: Negative for hearing loss, mouth sores, sore throat, tinnitus and trouble swallowing.    Eyes: Negative for pain, discharge and redness.   Respiratory: Negative for apnea, cough, choking, chest tightness, shortness of breath, wheezing and stridor.    Cardiovascular: Negative for chest pain, palpitations and leg swelling.   Gastrointestinal: Negative for abdominal distention, abdominal pain, blood in stool, constipation, diarrhea, nausea, rectal pain and vomiting.   Endocrine: Negative for cold intolerance, heat intolerance, polydipsia, polyphagia and polyuria.   Genitourinary: Negative for difficulty urinating, dysuria, flank pain, frequency, hematuria and urgency.   Musculoskeletal: Negative for arthralgias, back pain, gait problem, joint swelling, neck pain and neck stiffness.   Skin: Positive for color change and wound. Negative for rash.        Left foot with worsening erythema.    Allergic/Immunologic: Negative for food allergies.   Neurological: Negative for dizziness, tremors, seizures, syncope, speech difficulty, light-headedness and headaches.   Hematological: Negative for adenopathy. Does not bruise/bleed easily.   Psychiatric/Behavioral: Negative for agitation and confusion. The patient is not nervous/anxious.    All other systems reviewed and are negative.    Objective:     Vital Signs (Most Recent):  Temp: 98.5 °F (36.9 °C) (11/03/18 0400)  Pulse: 79 (11/03/18 0715)  Resp: 18 (11/03/18 0715)  BP: (!) 164/79 (11/03/18 0400)  SpO2: (!) 94 % (11/03/18 0715) Vital Signs (24h Range):  Temp:  [97.6 °F (36.4 °C)-98.6 °F (37 °C)] 98.5 °F (36.9 °C)  Pulse:  [68-94] 79  Resp:  [18-20] 18  SpO2:  [92 %-97 %] 94  %  BP: (111-175)/(54-95) 164/79     Weight: 99.8 kg (220 lb 0.3 oz)  Body mass index is 30.69 kg/m².    Estimated Creatinine Clearance: 59.1 mL/min (A) (based on SCr of 1.5 mg/dL (H)).    Physical Exam   Constitutional: He is oriented to person, place, and time. He appears well-developed and well-nourished. No distress.   HENT:   Head: Normocephalic and atraumatic.   Mouth/Throat: Oropharynx is clear and moist.   Eyes: Conjunctivae and EOM are normal. Pupils are equal, round, and reactive to light.   Neck: Normal range of motion. Neck supple. No JVD present.   Cardiovascular: Normal rate, regular rhythm, normal heart sounds and intact distal pulses. Exam reveals no gallop and no friction rub.   No murmur heard.  Pulmonary/Chest: Effort normal and breath sounds normal. No stridor. No respiratory distress. He has no wheezes. He has no rales. He exhibits no tenderness.   Abdominal: Soft. Bowel sounds are normal. He exhibits no distension and no mass. There is no tenderness. There is no rebound and no guarding.   Musculoskeletal: Normal range of motion. He exhibits no edema or tenderness.   Dressing over foot noted   Neurological: He is alert and oriented to person, place, and time. No cranial nerve deficit.   Skin: Skin is warm and dry. No rash noted. He is not diaphoretic. There is erythema.   -   Psychiatric: He has a normal mood and affect. His speech is normal. Judgment and thought content normal. He is withdrawn. Cognition and memory are normal. He is inattentive.   Nursing note and vitals reviewed.      Significant Labs:   Blood Culture:   Recent Labs   Lab 09/18/18  1812 09/21/18  0907 09/21/18  1516 10/31/18  1054 10/31/18  1055   LABBLOO Gram stain aer bottle: Gram positive cocci in clusters resembling Staph   Gram stain susan bottle: Gram positive cocci in clusters resembling Staph   Results called to and read back by:NOA Eckert RN 09/19/2018  15:31  STAPHYLOCOCCUS AUREUS  [place] consult [place] [facility] [email or web address] and [facility] locations.  For susceptibility see order # [id number]   No growth after 5 days. No growth after 5 days. No Growth to date  No Growth to date  No Growth to date No Growth to date  No Growth to date  No Growth to date     BMP:   Recent Labs   Lab 11/03/18  0508   *   *   K 3.9      CO2 22*   BUN 21   CREATININE 1.5*   CALCIUM 9.4     CBC:   Recent Labs   Lab 11/02/18  0450 11/03/18  0508   WBC 6.86 8.43   HGB 11.5* 11.9*   HCT 35.9* 37.7*    176     CMP:   Recent Labs   Lab 11/02/18  0450 11/03/18  0508   * 135*   K 3.8 3.9   CL 99 101   CO2 21* 22*   * 132*   BUN 26* 21   CREATININE 1.8* 1.5*   CALCIUM 9.0 9.4   ANIONGAP 13 12   EGFRNONAA 39* 48*     Wound Culture:   Recent Labs   Lab 07/09/18  0856 09/21/18  1235 10/18/18  1328 10/31/18  0911 10/31/18  1640   LABAERO CITROBACTER FREUNDII  Rare    KLEBSIELLA OXYTOCA  Rare   STAPHYLOCOCCUS AUREUS  Few  Skin skylar also present   Skin skylar,  no predominant organism GRAM NEGATIVE MIGUEL ANGEL  Many  Identification and susceptibility pending   ENTEROCOCCUS FAECIUM  Rare  Susceptibility pending    ENTEROBACTER CLOACAE COMPLEX  Rare  Susceptibility pending         Significant Imaging: I have reviewed all pertinent imaging results/findings within the past 24 hours.

## 2018-11-03 NOTE — ASSESSMENT & PLAN NOTE
- patient is noted to be a poor historian and does not know what medications he takes at home  - per d/w Edmundo Hernandez today, he is currently prescribed Prednisone 5 mg daily, Cellcept 500 mg BID, and Prograf 1.5 mg BID  - Nephrology consulted  - Continue home Prednisone and Prograf for now  - Hold Cellcept per Dr. Staley  - Monitor Prograf levels   - Followed by Dr. Blanco in [place]   -Continue current medical management plan

## 2018-11-03 NOTE — ASSESSMENT & PLAN NOTE
- A1c 8.0 in September  -Glucose slightly uncontrolled  - Accu checks ACHS with SSI PRN  - add NPH at lower doses  - Monitor  -Better controlled

## 2018-11-03 NOTE — ASSESSMENT & PLAN NOTE
- with delayed wound healing secondary to non-compliance with podiatry recommendations (patient reports getting foot wet and bearing weight)  - Defer care to podiatry  -wound cultures growing Enterobacter and Enterococcus  -scheduled for wound closure on Monday 11/5/18  -Infectious Disease following  -will need LTAC vs SNF  -Will continue IV zyvox and zosyn

## 2018-11-03 NOTE — ASSESSMENT & PLAN NOTE
10/31- will continue close glucose control.  Insulin therapy     11/1- will continue insulin sliding scale , continue close follow up    11/20 insulin sliding scale as per primary team

## 2018-11-03 NOTE — ASSESSMENT & PLAN NOTE
-Creatinine 1.8 today. Slight increase. Closely monitor  - Daily BMP  - Monitor  -Creatinine 1.5 at baseline

## 2018-11-03 NOTE — PLAN OF CARE
Consult addressed but per Podiatry The wound does appear to be viable and will be scheduled for secondary closure by my colleague Dr. Wheeler on this coming Monday. Today's dressings are flush and pack. These may stay in place until my next evaluation early in the AM on Sunday.  IV antibiotics.

## 2018-11-03 NOTE — PLAN OF CARE
Problem: Patient Care Overview  Goal: Plan of Care Review  Outcome: Ongoing (interventions implemented as appropriate)   Patient remained free from injury. No c/o pain at this time. Calm. lethargic but arouses to voice. No distress noted. Oriented x3. Respirations even and non labored. IV patent and infusing. Dressing changed. Bed locked and low. Left LE elevated on pillow. Call light in reach. Safety measures in place. Will continue to monitor. Reviewed plan of care. Patient verbalized understanding and teach back.    12hr chart check complete.

## 2018-11-03 NOTE — ASSESSMENT & PLAN NOTE
10/31- podiatry follow up , continue close follow up     11/1- will use operative cultures to guide therapy -prelim cultures -GNR, will continue zosyn and stop vanco ,follow ID of GNR     11/2- will follow pathology report and final cultures -prelim cultures- enterobacter /enterococcus-will add zyvox for possible VRE to zosyn.  Will adjust therapy with final drug susceptibility

## 2018-11-03 NOTE — ASSESSMENT & PLAN NOTE
10/31- nephrology follow up   11/1- close nephrology follow up .    11/2- will continue to closely monitor , nephrology follow up

## 2018-11-03 NOTE — PLAN OF CARE
Problem: Patient Care Overview  Goal: Plan of Care Review  Outcome: Ongoing (interventions implemented as appropriate)  Plan of care discussed with patient . Possible side effects of medications was discussed. No concerns voiced.  Pain controled with PO & IV medications, positioning, and relaxation techniques. Pt advised to call for assistance when ambulating. Remains free from injuries. Side rails up, bed alarm initiated, call bell within reach, personal area free from clutter.

## 2018-11-03 NOTE — PLAN OF CARE
Problem: Patient Care Overview  Goal: Plan of Care Review  Outcome: Ongoing (interventions implemented as appropriate)  Pt is on room air; tolerates txs well.

## 2018-11-03 NOTE — PROGRESS NOTES
[facility]  Podiatry  Progress Note    Patient Name: Lavelle Ladd  MRN: [id number]  Admission Date: 10/31/2018  Hospital Length of Stay: 2 days  Attending Physician: Shelton Edgar MD  Primary Care Provider: Rishabh Esteban MD     Subjective:     Interval History: 63M, s/p 10/31/2018 incision and drainage to the level of bone, left foot, with revisional transmetatarsal amputation, open.  Patient does have a contralateral below-knee amputation.  Patient denies further systemic signs infection.  Resting comfortably in bed at this time.  Patient is pending repeat closure wound on this coming Monday with my colleague, Dr. Wheeler.     Follow-up For: Procedure(s) (LRB):  AMPUTATION, FOOT, TRANSMETATARSAL (Left)  IRRIGATION AND DEBRIDEMENT, LOWER EXTREMITY (Left)    Post-Operative Day: 2 Days Post-Op    Scheduled Meds:   amLODIPine  2.5 mg Oral Daily    arformoterol  15 mcg Nebulization Q12H    aspirin  81 mg Oral Daily    budesonide  0.5 mg Nebulization Q12H    gabapentin  300 mg Oral TID    insulin NPH  10 Units Subcutaneous BID AC    linezolid  600 mg Oral Q12H    metoprolol tartrate  25 mg Oral BID    nozaseptin   Each Nare BID    piperacillin-tazobactam (ZOSYN) IVPB  4.5 g Intravenous Q8H    predniSONE  5 mg Oral Daily    sodium bicarbonate  2,600 mg Oral BID    tacrolimus  1.5 mg Oral BID     Continuous Infusions:  PRN Meds:dextrose 50%, dextrose 50%, glucagon (human recombinant), glucose, glucose, hydrALAZINE, HYDROcodone-acetaminophen, insulin aspart U-100, morphine, ondansetron, ondansetron, sodium chloride 0.9%, sodium chloride 0.9%, sodium chloride 0.9%    Review of Systems  Objective:     Vital Signs (Most Recent):  Temp: 98.1 °F (36.7 °C) (11/02/18 1926)  Pulse: 72 (11/02/18 1944)  Resp: 18 (11/02/18 1944)  BP: (!) 175/95 (11/02/18 1926)  SpO2: (!) 93 % (11/02/18 1944) Vital Signs (24h Range):  Temp:  [97.6 °F (36.4 °C)-98.4 °F (36.9 °C)] 98.1 °F (36.7 °C)  Pulse:   [65-83] 72  Resp:  [18-20] 18  SpO2:  [92 %-97 %] 93 %  BP: (111-175)/(54-95) 175/95     Weight: 99.8 kg (220 lb 0.3 oz)  Body mass index is 30.69 kg/m².    Foot Exam    Laboratory:  A1C:   Recent Labs   Lab 06/12/18  0901 07/07/18  0432 09/19/18  0520   HGBA1C 9.9* 10.2* 8.0*     ABGs: No results for input(s): PH, PCO2, HCO3, POCSATURATED, BE in the last 168 hours.  Blood Cultures: No results for input(s): LABBLOO in the last 48 hours.  BMP:   Recent Labs   Lab 10/31/18  1052  11/02/18  0450   GLU  --    < > 267*   NA  --    < > 133*   K  --    < > 3.8   CL  --    < > 99   CO2  --    < > 21*   BUN  --    < > 26*   CREATININE  --    < > 1.8*   CALCIUM  --    < > 9.0   MG 1.9  --   --     < > = values in this interval not displayed.     Cardiac Markers: No results for input(s): CKMB, TROPONINT, MYOGLOBIN in the last 168 hours.  CBC:   Recent Labs   Lab 11/02/18  0450   WBC 6.86   RBC 3.92*   HGB 11.5*   HCT 35.9*      MCV 92   MCH 29.3   MCHC 32.0     CMP:   Recent Labs   Lab 10/31/18  1053  11/02/18  0450   *   < > 267*   CALCIUM 9.5   < > 9.0   ALBUMIN 3.3*  --   --    PROT 7.2  --   --       < > 133*   K 4.8   < > 3.8   CO2 23   < > 21*      < > 99   BUN 20   < > 26*   CREATININE 1.5*   < > 1.8*   ALKPHOS 86  --   --    ALT 12  --   --    AST 20  --   --    BILITOT 0.6  --   --     < > = values in this interval not displayed.     Coagulation:   Recent Labs   Lab 10/31/18  1126   INR 1.0     CPS: {:LNK,LABRCNTIP[  CRP:   Recent Labs   Lab 10/31/18  1053   CRP 1.2     ESR:   Recent Labs   Lab 10/31/18  1054   SEDRATE 14*     LFTs:   Recent Labs   Lab 10/31/18  1053   ALT 12   AST 20   ALKPHOS 86   BILITOT 0.6   PROT 7.2   ALBUMIN 3.3*     Prealbumin: No results for input(s): PREALBUMIN in the last 48 hours.  Wound Cultures:   Recent Labs   Lab 07/09/18  0856 09/21/18  1235 10/18/18  1328 10/31/18  0911 10/31/18  1640   LABAERO CITROBACTER FREUNDII  Rare    KLEBSIELLA OXYTOCA  Rare    STAPHYLOCOCCUS AUREUS  Few  Skin skylar also present   Skin skylar,  no predominant organism GRAM NEGATIVE MIGUEL ANGEL  Many  Identification and susceptibility pending   ENTEROCOCCUS FAECIUM  Rare  Susceptibility pending    ENTEROBACTER CLOACAE COMPLEX  Rare  Susceptibility pending       Microbiology Results (last 7 days)     Procedure Component Value Units Date/Time    Blood culture [[id number]] Collected:  10/31/18 1055    Order Status:  Completed Specimen:  Blood Updated:  11/02/18 1812     Blood Culture, Routine No Growth to date     Blood Culture, Routine No Growth to date     Blood Culture, Routine No Growth to date    Blood culture [[id number]] Collected:  10/31/18 1054    Order Status:  Completed Specimen:  Blood Updated:  11/02/18 1812     Blood Culture, Routine No Growth to date     Blood Culture, Routine No Growth to date     Blood Culture, Routine No Growth to date    Aerobic culture [[id number]] Collected:  10/31/18 1640    Order Status:  Completed Specimen:  Incision site from Foot, Left Updated:  11/02/18 1154     Aerobic Bacterial Culture --     ENTEROCOCCUS FAECIUM  Rare  Susceptibility pending       Aerobic Bacterial Culture --     ENTEROBACTER CLOACAE COMPLEX  Rare  Susceptibility pending      Culture, Anaerobic [[id number]] Collected:  10/31/18 1640    Order Status:  Completed Specimen:  Incision site from Foot, Left Updated:  11/02/18 0746     Anaerobic Culture Culture in progress    Blood culture [[id number]]     Order Status:  Canceled Specimen:  Blood     Blood culture [[id number]]     Order Status:  Canceled Specimen:  Blood         Specimen (12h ago, onward)    None          Diagnostic Results:  I have reviewed all pertinent imaging results/findings within the past 24 hours.    Clinical Findings:  Retention sutures remain intact. No purulence or malodor is noted. No periwound erythema cellulitis noted. No calor fluctuance noted.  Probe to bone.  Wound edges do appear to be viable.  Nonpalpable pedal  pulses.    Assessment/Plan:     Active Diagnoses:    Diagnosis Date Noted POA    PRINCIPAL PROBLEM:  Abscess of left foot [L02.612] 10/31/2018 Unknown    Immunosuppression [D89.9]  Unknown    Encounter for medication review and counseling [Z71.89]  Not Applicable    Hx of right BKA [Z89.511] 11/01/2018 Not Applicable    S/P transmetatarsal amputation of foot, left [Z89.432] 10/31/2018 Not Applicable    Non compliance with medical treatment [Z91.19] 10/31/2018 Not Applicable    Cellulitis of left foot [L03.116] 07/20/2018 Yes    Peripheral vascular disease [I73.9] 07/06/2018 Yes    Chronic bilateral low back pain with left-sided sciatica [M54.42, G89.29] 01/25/2018 Yes    Kidney transplant recipient [Z94.0] 04/07/2017 Not Applicable    Stage 3 chronic kidney disease [N18.3] 09/25/2016 Unknown     Chronic    Chronic obstructive pulmonary disease [J44.9] 09/12/2016 Yes    Coronary artery disease involving native coronary artery of native heart without angina pectoris [I25.10] 07/01/2016 Yes    Type 2 diabetes mellitus with hyperglycemia, with long-term current use of insulin [E11.65, Z79.4]  Not Applicable    Essential hypertension [I10] 02/20/2015 Yes      Problems Resolved During this Admission:       63M, s/p 10/31/2018 incision and drainage to the level of bone, left foot, with revisional transmetatarsal amputation, open.  No signs of complications postop.  The wound does appear to be viable and will be scheduled for secondary closure by my colleague Dr. Wheeler on this coming Monday. Today's dressings are flush and pack. These may stay in place until my next evaluation early in the AM on Sunday.  IV antibiotics.    Txe Guthrie DPM  Podiatry  [facility]

## 2018-11-03 NOTE — ASSESSMENT & PLAN NOTE
62 y/o male with a h/o of KTx has wound infection after amputation:                Kidney transplant recipient     1. H/o of CKD stage 3.  s Cr is stable and has not worsened from baseline  K normal  Metabolic acidosis, mild  Mild hyponatremia noted     H/o of cadaveric kidney transplant in May 2016  Doing well, stable hemodynamically  On immunosuppressive therapy  Prograf level is within the therapeutic range  No change in dose of prograf  Cellcept is on hold, keep on hold, due to current and active infection     HTN : BP not controlled,   Meds reviewed  Will increase amlodipine dose to 5 mg po qd     H/o of DM  Diabetic nephropathy             * Abscess of left foot     Left foot wound infection post mid foot amputation  Wound cx growing Enterococcus and Enterobacter  Abx reviewed with ID,   Pt on zosyn and was on vancomycin until today  zyvox was added.  Agree with current mgmt.                Plans and recommendations:  As detailed above  Will f/u closely.

## 2018-11-03 NOTE — PROGRESS NOTES
[facility]   Nephrology  Progress Note    Patient Name: Laevlle Ladd  MRN: [id number]  Admission Date: 10/31/2018  Hospital Length of Stay: 3 days  Attending Provider: Shelton Edgar MD   Primary Care Physician: Rishabh Estbean MD  Principal Problem:S/P transmetatarsal amputation of foot, left    Subjective:     HPI: Lavelle Ladd is a pleasant 63 y old  man  , s/p   donor ( brain death ) kidney transplant on 16.  He has CKD stage 3 - GFR 30-59 and his baseline creatinine is between 1.5-1.8. He takes  prednisone 5 mg daily and tacrolimus 1.5 mg twice a day , for maintenance immunosuppression.  He he is also on CellCept 500 mg twice a day, will hold this medication due to cellulitis of his left foot, he is status post left transmetatarsal amputation on 2018, patient was seen for follow-up appointment in the clinic yesterday and notice that his foot was infected, patient was admitted to the hospital for further management,      Interval History: Pt was seen and examined. No new c/o's, stable last pm, labs and meds reviewed with pt.    Review of patient's allergies indicates:  No Known Allergies  Current Facility-Administered Medications   Medication Frequency    amLODIPine tablet 5 mg Daily    arformoterol nebulizer solution 15 mcg Q12H    aspirin EC tablet 81 mg Daily    budesonide nebulizer solution 0.5 mg Q12H    dextrose 50% injection 12.5 g PRN    dextrose 50% injection 25 g PRN    gabapentin capsule 300 mg TID    glucagon (human recombinant) injection 1 mg PRN    glucose chewable tablet 16 g PRN    glucose chewable tablet 24 g PRN    hydrALAZINE tablet 25 mg Q8H PRN    HYDROcodone-acetaminophen  mg per tablet 1 tablet Q4H PRN    insulin aspart U-100 pen 0-5 Units QID (AC + HS) PRN    insulin NPH injection 20 Units BID AC    linezolid tablet 600 mg Q12H    metoprolol tartrate (LOPRESSOR) tablet 25 mg BID    morphine injection 1 mg Q4H  PRN    nozaseptin (NOZIN) nasal  BID    ondansetron disintegrating tablet 8 mg Q8H PRN    ondansetron injection 4 mg Q8H PRN    piperacillin-tazobactam 4.5 g in dextrose 5 % 100 mL IVPB (ready to mix system) Q8H    predniSONE tablet 5 mg Daily    sodium bicarbonate tablet 2,600 mg BID    sodium chloride 0.9% flush 3 mL PRN    sodium chloride 0.9% flush 3 mL PRN    sodium chloride 0.9% flush 5 mL PRN    tacrolimus capsule 1.5 mg BID       Objective:     Vital Signs (Most Recent):  Temp: 98.4 °F (36.9 °C) (11/03/18 1154)  Pulse: 102 (11/03/18 1154)  Resp: 20 (11/03/18 1154)  BP: (!) 125/58 (11/03/18 1154)  SpO2: 95 % (11/03/18 1154)  O2 Device (Oxygen Therapy): nasal cannula (11/03/18 1020) Vital Signs (24h Range):  Temp:  [97.6 °F (36.4 °C)-98.7 °F (37.1 °C)] 98.4 °F (36.9 °C)  Pulse:  [] 102  Resp:  [18-20] 20  SpO2:  [92 %-95 %] 95 %  BP: (125-175)/(58-95) 125/58     Weight: 99.8 kg (220 lb 0.3 oz) (10/31/18 1049)  Body mass index is 30.69 kg/m².  Body surface area is 2.24 meters squared.    I/O last 3 completed shifts:  In: 1855 [P.O.:1105; IV Piggyback:750]  Out: 1375 [Urine:1375]    Physical Exam   Constitutional: He is oriented to person, place, and time. He appears well-developed and well-nourished.   HENT:   Head: Normocephalic and atraumatic.   Neck: No JVD present.   Cardiovascular: Normal rate, regular rhythm and normal heart sounds.   Pulmonary/Chest: No respiratory distress. He has no rales.   Abdominal: Soft. He exhibits no distension.   Musculoskeletal: He exhibits no edema.   Neurological: He is oriented to person, place, and time.   Skin: Skin is dry.   Psychiatric: He has a normal mood and affect. His behavior is normal.   Nursing note and vitals reviewed.      Significant Labs: reviewed  BMP  Lab Results   Component Value Date     (L) 11/03/2018    K 3.9 11/03/2018     11/03/2018    CO2 22 (L) 11/03/2018    BUN 21 11/03/2018    CREATININE 1.5 (H) 11/03/2018     CALCIUM 9.4 11/03/2018    ANIONGAP 12 11/03/2018    ESTGFRAFRICA 56 (A) 11/03/2018    EGFRNONAA 48 (A) 11/03/2018     Lab Results   Component Value Date    WBC 8.43 11/03/2018    HGB 11.9 (L) 11/03/2018    HCT 37.7 (L) 11/03/2018    MCV 92 11/03/2018     11/03/2018     Prograf 5.2    Assessment/Plan:         64 y/o male with a h/o of KTx has wound infection after amputation:                Kidney transplant recipient     1. H/o of CKD stage 3. stable renal function  s Cr noted lower today.   K normal  Metabolic acidosis, mild, stable  Mild hyponatremia noted, stable     H/o of cadaveric kidney transplant in May 2016  Doing well, stable hemodynamically  On immunosuppressive therapy  Prograf level is within the therapeutic range  No change in dose of prograf  Cellcept is on hold, keep on hold, due to current and active infection     HTN : BP controlled now after amlodipine dose increase  Meds reviewed    H/o of DM  Diabetic nephropathy             * Abscess of left foot     Left foot wound infection post mid foot amputation  Wound cx growing Enterococcus and Enterobacter  Abx reviewed   Agree with current mgmt  Continue zosyn and zyvox             Plans and recommendations:  As discussed above  Will f/u closely.               Maureen Cherry MD  Nephrology  [facility]

## 2018-11-03 NOTE — PROGRESS NOTES
[facility] Hospital Medicine  Progress Note    Patient Name: Lavelle Ladd  MRN: [id number]  Patient Class: IP- Inpatient   Admission Date: 10/31/2018  Length of Stay: 3 days  Attending Physician: Shelton Edgar MD  Primary Care Provider: Rishabh Esteban MD        Subjective:     Principal Problem:S/P transmetatarsal amputation of foot, left    HPI:  Lavelle Ladd is a 63 y.o. male  with a PMHx of COPD, CAD, Diastolic CHF, CKD, Renal transplant, GERD, Hepatitis C, HLD, HTN, PVD, and IDDM who presented to the hospital today as a direct admit for planned surgery today per Dr. Wheeler.  Left foot xray today showed amputation of the distal aspect of the left foot at the level of the metatarsal bones.  No lytic abnormalities to suggest osteomyelitis by plain radiograph.  No evidence of acute fracture or dislocation.  Bony mineralization is normal.  Diffuse soft tissue and dense vascular calcifications.  Large plantar calcaneal spur.  He is s/p left transmetatarsal amputation secondary to gangrene of multiple digits and JOSE R.  Patient underwent delayed primary wound closure, on 9/24/18.  He presented to podiatry today for his 2 week follow-up of wound closure 2/2 to wound dehiscence.  Patient missed his last postoperative visit as he states he was unable to get a ride to his appointment.  He also admits to ambulating on his amputation site.  He drove himself to clinic today as he was unable to get a ride.  He states that he has gotten his foot wet as he has taken showers covering extremity in a garbage bag.  He denies any fever, chills, foot pain, CP, SOB, N/V/D, and all other symptoms at this time.  Patient has a history of poor compliance and difficult living dispo as he lives alone.  In the past we did try to get patient admitted to skilled nursing facility, but he has refused such services.   Wound cx on 9/21 showed MSSA for which he has completed IV cefazolin for 14 days secondary to MSSA  gangrene.  He has been NPO since midnight.  He will be admitted to the Medicine unit under the care of Hospital Medicine.  ID consulted per podiatry recs to guide antibiotic therapy postoperatively.  He is a Full [employer].  His SDM is his sister Kecia Williamson who can be reached at [phone].                Hospital Course:  11/1/18 The patient is S/P I&D of left foot yesterday. No acute issues overnight. The patient reports that his pain is well controlled. Continue IV ABX, cultures pending. Infectious disease is following. 11/2/18 wound culture growing Enterococcus and Enterobacter. Podiatry recommends SNF vs LTAC placement due to potential for limb loss. 11/3/18- Will continue IV zyvox and zosyn. Creatinine level at baseline. Surgery planned for Monday by Dr. Wheeler for secondary wound closure.           Interval History: No acute events overnight. Patient reports his not feeling well. Nurse reports patient started feeling like this last night after receiving morphine. Patient drowsy but easily aroused. Oriented x3.  Pain medications currently on HOLD. Monitor closely.     Review of Systems   Constitutional: Negative for chills, diaphoresis, fatigue and fever.   HENT: Negative for hearing loss, mouth sores, sore throat, tinnitus and trouble swallowing.    Eyes: Negative for pain, discharge and redness.   Respiratory: Negative for apnea, cough, choking, chest tightness, shortness of breath, wheezing and stridor.    Cardiovascular: Negative for chest pain, palpitations and leg swelling.   Gastrointestinal: Negative for abdominal distention, abdominal pain, blood in stool, constipation, diarrhea, nausea, rectal pain and vomiting.   Endocrine: Negative for cold intolerance, heat intolerance, polydipsia, polyphagia and polyuria.   Genitourinary: Negative for difficulty urinating, dysuria, flank pain, frequency, hematuria and urgency.   Musculoskeletal: Negative for arthralgias, back pain, gait problem, joint  swelling, neck pain and neck stiffness.   Skin: Positive for color change and wound. Negative for rash.   Allergic/Immunologic: Negative for food allergies.   Neurological: Negative for dizziness, tremors, seizures, syncope, speech difficulty, light-headedness and headaches.   Hematological: Negative for adenopathy. Does not bruise/bleed easily.   Psychiatric/Behavioral: Negative for agitation and confusion. The patient is not nervous/anxious.    All other systems reviewed and are negative.    Objective:     Vital Signs (Most Recent):  Temp: 98.4 °F (36.9 °C) (11/03/18 1154)  Pulse: 102 (11/03/18 1154)  Resp: 20 (11/03/18 1154)  BP: (!) 125/58 (11/03/18 1154)  SpO2: 95 % (11/03/18 1154) Vital Signs (24h Range):  Temp:  [97.6 °F (36.4 °C)-98.7 °F (37.1 °C)] 98.4 °F (36.9 °C)  Pulse:  [] 102  Resp:  [18-20] 20  SpO2:  [92 %-95 %] 95 %  BP: (125-175)/(58-95) 125/58     Weight: 99.8 kg (220 lb 0.3 oz)  Body mass index is 30.69 kg/m².    Intake/Output Summary (Last 24 hours) at 11/3/2018 1508  Last data filed at 11/3/2018 0800  Gross per 24 hour   Intake 1165 ml   Output 650 ml   Net 515 ml      Physical Exam   Constitutional: He is oriented to person, place, and time. He appears well-developed and well-nourished. No distress.   HENT:   Head: Normocephalic and atraumatic.   Mouth/Throat: Oropharynx is clear and moist.   Eyes: Conjunctivae and EOM are normal. Pupils are equal, round, and reactive to light.   Neck: Normal range of motion. Neck supple. No JVD present.   Cardiovascular: Normal rate, regular rhythm, normal heart sounds and intact distal pulses. Exam reveals no gallop and no friction rub.   No murmur heard.  Pulmonary/Chest: Effort normal and breath sounds normal. No stridor. No respiratory distress. He has no wheezes. He has no rales. He exhibits no tenderness.   Abdominal: Soft. Bowel sounds are normal. He exhibits no distension and no mass. There is no tenderness. There is no rebound and no guarding.    Musculoskeletal: Normal range of motion. He exhibits no edema or tenderness.   Neurological: He is alert and oriented to person, place, and time. No cranial nerve deficit.   Skin: Skin is warm and dry. No rash noted. He is not diaphoretic. No erythema.   LLE dressing CDI.    Psychiatric: He has a normal mood and affect. His speech is normal. Judgment and thought content normal. He is withdrawn. Cognition and memory are normal. He is inattentive.   Nursing note and vitals reviewed.      Significant Labs:   Blood Culture: No results for input(s): LABBLOO in the last 48 hours.  BMP:   Recent Labs   Lab 11/03/18  0508   *   *   K 3.9      CO2 22*   BUN 21   CREATININE 1.5*   CALCIUM 9.4     CBC:   Recent Labs   Lab 11/02/18  0450 11/03/18  0508   WBC 6.86 8.43   HGB 11.5* 11.9*   HCT 35.9* 37.7*    176     CMP:   Recent Labs   Lab 11/02/18  0450 11/03/18  0508   * 135*   K 3.8 3.9   CL 99 101   CO2 21* 22*   * 132*   BUN 26* 21   CREATININE 1.8* 1.5*   CALCIUM 9.0 9.4   ANIONGAP 13 12   EGFRNONAA 39* 48*     All pertinent labs within the past 24 hours have been reviewed.    Significant Imaging:       Assessment/Plan:      * S/P transmetatarsal amputation of foot, left    - with delayed wound healing secondary to non-compliance with podiatry recommendations (patient reports getting foot wet and bearing weight)  - Defer care to podiatry  -wound cultures growing Enterobacter and Enterococcus  -scheduled for wound closure on Monday 11/5/18  -Infectious Disease following  -will need LTAC vs SNF  -Will continue IV zyvox and zosyn      Immunosuppression    Hx of kidney transplant   Continue prednisone and Prograf per Nephrology       Hx of right BKA    - Site is intact  -No s/s of infection          Non compliance with medical treatment    - Educated on the importance of adhering to medical treatment plan  - Patient has a limited support system  - Social work consult for DC planning        Abscess of left foot    See plan for s/p transmetatarsal amputation of foot         Cellulitis of left foot    - See plan as per above       Peripheral vascular disease    - Resume home ASA   - Vascular f/u as needed       Chronic bilateral low back pain with left-sided sciatica    - Resume home Norco PRN  - Monitor  -Pain controlled     Kidney transplant recipient    - patient is noted to be a poor historian and does not know what medications he takes at home  - per d/w Edmundo Hernandez today, he is currently prescribed Prednisone 5 mg daily, Cellcept 500 mg BID, and Prograf 1.5 mg BID  - Nephrology consulted  - Continue home Prednisone and Prograf for now  - Hold Cellcept per Dr. Staley  - Monitor Prograf levels   - Followed by Dr. Blanco in [place]   -Continue current medical management plan        Stage 3 chronic kidney disease    -Creatinine 1.8 today. Slight increase. Closely monitor  - Daily BMP  - Monitor  -Creatinine 1.5 at baseline        Chronic obstructive pulmonary disease    - Currently appears compensated  - Continue home meds  - Supplemental O2 PRN  - Monitor       Coronary artery disease involving native coronary artery of native heart without angina pectoris    -Continue Cardiac medications  -Cardiac monitoring  -Stable        Type 2 diabetes mellitus with hyperglycemia, with long-term current use of insulin    - A1c 8.0 in September  -Glucose slightly uncontrolled  - Accu checks ACHS with SSI PRN  - add NPH at lower doses  - Monitor  -Better controlled        Essential hypertension    - BP well controlled  - Continue home Norvasc and Metoprolol  - Monitor         VTE Risk Mitigation (From admission, onward)        Ordered     IP VTE HIGH RISK PATIENT  Once      10/31/18 1731     Place sequential compression device  Until discontinued      10/31/18 1104              Carla Irby NP  Department of Hospital Medicine   [facility]

## 2018-11-03 NOTE — SUBJECTIVE & OBJECTIVE
Interval History: No acute events overnight. Patient reports his not feeling well. Nurse reports patient started feeling like this last night after receiving morphine. Patient drowsy but easily aroused. Oriented x3.  Pain medications currently on HOLD. Monitor closely.     Review of Systems   Constitutional: Negative for chills, diaphoresis, fatigue and fever.   HENT: Negative for hearing loss, mouth sores, sore throat, tinnitus and trouble swallowing.    Eyes: Negative for pain, discharge and redness.   Respiratory: Negative for apnea, cough, choking, chest tightness, shortness of breath, wheezing and stridor.    Cardiovascular: Negative for chest pain, palpitations and leg swelling.   Gastrointestinal: Negative for abdominal distention, abdominal pain, blood in stool, constipation, diarrhea, nausea, rectal pain and vomiting.   Endocrine: Negative for cold intolerance, heat intolerance, polydipsia, polyphagia and polyuria.   Genitourinary: Negative for difficulty urinating, dysuria, flank pain, frequency, hematuria and urgency.   Musculoskeletal: Negative for arthralgias, back pain, gait problem, joint swelling, neck pain and neck stiffness.   Skin: Positive for color change and wound. Negative for rash.   Allergic/Immunologic: Negative for food allergies.   Neurological: Negative for dizziness, tremors, seizures, syncope, speech difficulty, light-headedness and headaches.   Hematological: Negative for adenopathy. Does not bruise/bleed easily.   Psychiatric/Behavioral: Negative for agitation and confusion. The patient is not nervous/anxious.    All other systems reviewed and are negative.    Objective:     Vital Signs (Most Recent):  Temp: 98.4 °F (36.9 °C) (11/03/18 1154)  Pulse: 102 (11/03/18 1154)  Resp: 20 (11/03/18 1154)  BP: (!) 125/58 (11/03/18 1154)  SpO2: 95 % (11/03/18 1154) Vital Signs (24h Range):  Temp:  [97.6 °F (36.4 °C)-98.7 °F (37.1 °C)] 98.4 °F (36.9 °C)  Pulse:  [] 102  Resp:  [18-20]  20  SpO2:  [92 %-95 %] 95 %  BP: (125-175)/(58-95) 125/58     Weight: 99.8 kg (220 lb 0.3 oz)  Body mass index is 30.69 kg/m².    Intake/Output Summary (Last 24 hours) at 11/3/2018 1508  Last data filed at 11/3/2018 0800  Gross per 24 hour   Intake 1165 ml   Output 650 ml   Net 515 ml      Physical Exam   Constitutional: He is oriented to person, place, and time. He appears well-developed and well-nourished. No distress.   HENT:   Head: Normocephalic and atraumatic.   Mouth/Throat: Oropharynx is clear and moist.   Eyes: Conjunctivae and EOM are normal. Pupils are equal, round, and reactive to light.   Neck: Normal range of motion. Neck supple. No JVD present.   Cardiovascular: Normal rate, regular rhythm, normal heart sounds and intact distal pulses. Exam reveals no gallop and no friction rub.   No murmur heard.  Pulmonary/Chest: Effort normal and breath sounds normal. No stridor. No respiratory distress. He has no wheezes. He has no rales. He exhibits no tenderness.   Abdominal: Soft. Bowel sounds are normal. He exhibits no distension and no mass. There is no tenderness. There is no rebound and no guarding.   Musculoskeletal: Normal range of motion. He exhibits no edema or tenderness.   Neurological: He is alert and oriented to person, place, and time. No cranial nerve deficit.   Skin: Skin is warm and dry. No rash noted. He is not diaphoretic. No erythema.   LLE dressing CDI.    Psychiatric: He has a normal mood and affect. His speech is normal. Judgment and thought content normal. He is withdrawn. Cognition and memory are normal. He is inattentive.   Nursing note and vitals reviewed.      Significant Labs:   Blood Culture: No results for input(s): LABBLOO in the last 48 hours.  BMP:   Recent Labs   Lab 11/03/18  0508   *   *   K 3.9      CO2 22*   BUN 21   CREATININE 1.5*   CALCIUM 9.4     CBC:   Recent Labs   Lab 11/02/18  0450 11/03/18  0508   WBC 6.86 8.43   HGB 11.5* 11.9*   HCT 35.9* 37.7*     176     CMP:   Recent Labs   Lab 11/02/18  0450 11/03/18  0508   * 135*   K 3.8 3.9   CL 99 101   CO2 21* 22*   * 132*   BUN 26* 21   CREATININE 1.8* 1.5*   CALCIUM 9.0 9.4   ANIONGAP 13 12   EGFRNONAA 39* 48*     All pertinent labs within the past 24 hours have been reviewed.    Significant Imaging:

## 2018-11-03 NOTE — SUBJECTIVE & OBJECTIVE
Interval History: Pt was seen and examined. No new c/o's, stable last pm, labs and meds reviewed with pt.    Review of patient's allergies indicates:  No Known Allergies  Current Facility-Administered Medications   Medication Frequency    amLODIPine tablet 5 mg Daily    arformoterol nebulizer solution 15 mcg Q12H    aspirin EC tablet 81 mg Daily    budesonide nebulizer solution 0.5 mg Q12H    dextrose 50% injection 12.5 g PRN    dextrose 50% injection 25 g PRN    gabapentin capsule 300 mg TID    glucagon (human recombinant) injection 1 mg PRN    glucose chewable tablet 16 g PRN    glucose chewable tablet 24 g PRN    hydrALAZINE tablet 25 mg Q8H PRN    HYDROcodone-acetaminophen  mg per tablet 1 tablet Q4H PRN    insulin aspart U-100 pen 0-5 Units QID (AC + HS) PRN    insulin NPH injection 20 Units BID AC    linezolid tablet 600 mg Q12H    metoprolol tartrate (LOPRESSOR) tablet 25 mg BID    morphine injection 1 mg Q4H PRN    nozaseptin (NOZIN) nasal  BID    ondansetron disintegrating tablet 8 mg Q8H PRN    ondansetron injection 4 mg Q8H PRN    piperacillin-tazobactam 4.5 g in dextrose 5 % 100 mL IVPB (ready to mix system) Q8H    predniSONE tablet 5 mg Daily    sodium bicarbonate tablet 2,600 mg BID    sodium chloride 0.9% flush 3 mL PRN    sodium chloride 0.9% flush 3 mL PRN    sodium chloride 0.9% flush 5 mL PRN    tacrolimus capsule 1.5 mg BID       Objective:     Vital Signs (Most Recent):  Temp: 98.4 °F (36.9 °C) (11/03/18 1154)  Pulse: 102 (11/03/18 1154)  Resp: 20 (11/03/18 1154)  BP: (!) 125/58 (11/03/18 1154)  SpO2: 95 % (11/03/18 1154)  O2 Device (Oxygen Therapy): nasal cannula (11/03/18 1020) Vital Signs (24h Range):  Temp:  [97.6 °F (36.4 °C)-98.7 °F (37.1 °C)] 98.4 °F (36.9 °C)  Pulse:  [] 102  Resp:  [18-20] 20  SpO2:  [92 %-95 %] 95 %  BP: (125-175)/(58-95) 125/58     Weight: 99.8 kg (220 lb 0.3 oz) (10/31/18 1049)  Body mass index is 30.69 kg/m².  Body  surface area is 2.24 meters squared.    I/O last 3 completed shifts:  In: 1855 [P.O.:1105; IV Piggyback:750]  Out: 1375 [Urine:1375]    Physical Exam   Constitutional: He is oriented to person, place, and time. He appears well-developed and well-nourished.   HENT:   Head: Normocephalic and atraumatic.   Neck: No JVD present.   Cardiovascular: Normal rate, regular rhythm and normal heart sounds.   Pulmonary/Chest: No respiratory distress. He has no rales.   Abdominal: Soft. He exhibits no distension.   Musculoskeletal: He exhibits no edema.   Neurological: He is oriented to person, place, and time.   Skin: Skin is dry.   Psychiatric: He has a normal mood and affect. His behavior is normal.   Nursing note and vitals reviewed.      Significant Labs: reviewed  BMP  Lab Results   Component Value Date     (L) 11/03/2018    K 3.9 11/03/2018     11/03/2018    CO2 22 (L) 11/03/2018    BUN 21 11/03/2018    CREATININE 1.5 (H) 11/03/2018    CALCIUM 9.4 11/03/2018    ANIONGAP 12 11/03/2018    ESTGFRAFRICA 56 (A) 11/03/2018    EGFRNONAA 48 (A) 11/03/2018     Lab Results   Component Value Date    WBC 8.43 11/03/2018    HGB 11.9 (L) 11/03/2018    HCT 37.7 (L) 11/03/2018    MCV 92 11/03/2018     11/03/2018     Prograf 5.2

## 2018-11-04 LAB
ANION GAP SERPL CALC-SCNC: 12 MMOL/L
BASOPHILS # BLD AUTO: 0.01 K/UL
BASOPHILS NFR BLD: 0.2 %
BUN SERPL-MCNC: 19 MG/DL
CALCIUM SERPL-MCNC: 9.3 MG/DL
CHLORIDE SERPL-SCNC: 103 MMOL/L
CO2 SERPL-SCNC: 20 MMOL/L
CREAT SERPL-MCNC: 1.6 MG/DL
DIFFERENTIAL METHOD: ABNORMAL
EOSINOPHIL # BLD AUTO: 0.1 K/UL
EOSINOPHIL NFR BLD: 2.4 %
ERYTHROCYTE [DISTWIDTH] IN BLOOD BY AUTOMATED COUNT: 16.5 %
EST. GFR  (AFRICAN AMERICAN): 51 ML/MIN/1.73 M^2
EST. GFR  (NON AFRICAN AMERICAN): 45 ML/MIN/1.73 M^2
GLUCOSE SERPL-MCNC: 302 MG/DL
HCT VFR BLD AUTO: 36.5 %
HGB BLD-MCNC: 11.4 G/DL
LYMPHOCYTES # BLD AUTO: 1.6 K/UL
LYMPHOCYTES NFR BLD: 31.5 %
MCH RBC QN AUTO: 28.6 PG
MCHC RBC AUTO-ENTMCNC: 31.2 G/DL
MCV RBC AUTO: 92 FL
MONOCYTES # BLD AUTO: 0.6 K/UL
MONOCYTES NFR BLD: 11 %
NEUTROPHILS # BLD AUTO: 2.8 K/UL
NEUTROPHILS NFR BLD: 54.9 %
PLATELET # BLD AUTO: 180 K/UL
PMV BLD AUTO: 10.2 FL
POCT GLUCOSE: 198 MG/DL (ref 70–110)
POCT GLUCOSE: 231 MG/DL (ref 70–110)
POCT GLUCOSE: 256 MG/DL (ref 70–110)
POCT GLUCOSE: 285 MG/DL (ref 70–110)
POTASSIUM SERPL-SCNC: 4 MMOL/L
RBC # BLD AUTO: 3.99 M/UL
SODIUM SERPL-SCNC: 135 MMOL/L
TACROLIMUS BLD-MCNC: 4.4 NG/ML
WBC # BLD AUTO: 5.08 K/UL

## 2018-11-04 PROCEDURE — 25000003 PHARM REV CODE 250: Performed by: NURSE PRACTITIONER

## 2018-11-04 PROCEDURE — 99233 SBSQ HOSP IP/OBS HIGH 50: CPT | Mod: ,,, | Performed by: INTERNAL MEDICINE

## 2018-11-04 PROCEDURE — 36415 COLL VENOUS BLD VENIPUNCTURE: CPT

## 2018-11-04 PROCEDURE — 63600175 PHARM REV CODE 636 W HCPCS: Performed by: NURSE PRACTITIONER

## 2018-11-04 PROCEDURE — 21400001 HC TELEMETRY ROOM

## 2018-11-04 PROCEDURE — 94640 AIRWAY INHALATION TREATMENT: CPT

## 2018-11-04 PROCEDURE — 25000242 PHARM REV CODE 250 ALT 637 W/ HCPCS: Performed by: PODIATRIST

## 2018-11-04 PROCEDURE — 99024 POSTOP FOLLOW-UP VISIT: CPT | Mod: ,,, | Performed by: PODIATRIST

## 2018-11-04 PROCEDURE — 96372 THER/PROPH/DIAG INJ SC/IM: CPT

## 2018-11-04 PROCEDURE — 94760 N-INVAS EAR/PLS OXIMETRY 1: CPT

## 2018-11-04 PROCEDURE — 63600175 PHARM REV CODE 636 W HCPCS: Performed by: INTERNAL MEDICINE

## 2018-11-04 PROCEDURE — 80197 ASSAY OF TACROLIMUS: CPT

## 2018-11-04 PROCEDURE — 80048 BASIC METABOLIC PNL TOTAL CA: CPT

## 2018-11-04 PROCEDURE — 25000003 PHARM REV CODE 250: Performed by: INTERNAL MEDICINE

## 2018-11-04 PROCEDURE — 85025 COMPLETE CBC W/AUTO DIFF WBC: CPT

## 2018-11-04 RX ORDER — AMLODIPINE BESYLATE 10 MG/1
10 TABLET ORAL DAILY
Status: DISCONTINUED | OUTPATIENT
Start: 2018-11-04 | End: 2018-11-07

## 2018-11-04 RX ORDER — HYDRALAZINE HYDROCHLORIDE 25 MG/1
25 TABLET, FILM COATED ORAL EVERY 8 HOURS
Status: DISCONTINUED | OUTPATIENT
Start: 2018-11-04 | End: 2018-11-04

## 2018-11-04 RX ORDER — INSULIN ASPART 100 [IU]/ML
1-10 INJECTION, SOLUTION INTRAVENOUS; SUBCUTANEOUS
Status: DISCONTINUED | OUTPATIENT
Start: 2018-11-04 | End: 2018-11-13 | Stop reason: HOSPADM

## 2018-11-04 RX ORDER — ISOSORBIDE DINITRATE AND HYDRALAZINE HYDROCHLORIDE 37.5; 2 MG/1; MG/1
1 TABLET ORAL 3 TIMES DAILY
Status: DISCONTINUED | OUTPATIENT
Start: 2018-11-04 | End: 2018-11-07

## 2018-11-04 RX ORDER — IBUPROFEN 200 MG
24 TABLET ORAL
Status: DISCONTINUED | OUTPATIENT
Start: 2018-11-04 | End: 2018-11-13 | Stop reason: HOSPADM

## 2018-11-04 RX ORDER — HYDRALAZINE HYDROCHLORIDE 25 MG/1
25 TABLET, FILM COATED ORAL ONCE
Status: COMPLETED | OUTPATIENT
Start: 2018-11-04 | End: 2018-11-04

## 2018-11-04 RX ORDER — IBUPROFEN 200 MG
16 TABLET ORAL
Status: DISCONTINUED | OUTPATIENT
Start: 2018-11-04 | End: 2018-11-13 | Stop reason: HOSPADM

## 2018-11-04 RX ADMIN — CEFTRIAXONE 1 G: 1 INJECTION, SOLUTION INTRAVENOUS at 01:11

## 2018-11-04 RX ADMIN — INSULIN ASPART 3 UNITS: 100 INJECTION, SOLUTION INTRAVENOUS; SUBCUTANEOUS at 06:11

## 2018-11-04 RX ADMIN — TACROLIMUS 1.5 MG: 0.5 CAPSULE ORAL at 08:11

## 2018-11-04 RX ADMIN — GABAPENTIN 300 MG: 300 CAPSULE ORAL at 09:11

## 2018-11-04 RX ADMIN — HUMAN INSULIN 20 UNITS: 100 INJECTION, SUSPENSION SUBCUTANEOUS at 04:11

## 2018-11-04 RX ADMIN — SODIUM BICARBONATE 650 MG TABLET 2600 MG: at 11:11

## 2018-11-04 RX ADMIN — HYDRALAZINE HYDROCHLORIDE AND ISOSORBIDE DINITRATE 1 TABLET: 37.5; 2 TABLET, FILM COATED ORAL at 04:11

## 2018-11-04 RX ADMIN — BUDESONIDE 0.5 MG: 0.5 SUSPENSION RESPIRATORY (INHALATION) at 07:11

## 2018-11-04 RX ADMIN — INSULIN ASPART 2 UNITS: 100 INJECTION, SOLUTION INTRAVENOUS; SUBCUTANEOUS at 10:11

## 2018-11-04 RX ADMIN — PREDNISONE 5 MG: 5 TABLET ORAL at 09:11

## 2018-11-04 RX ADMIN — LINEZOLID 600 MG: 600 TABLET, FILM COATED ORAL at 09:11

## 2018-11-04 RX ADMIN — PIPERACILLIN SODIUM AND TAZOBACTAM SODIUM 4.5 G: 4; .5 INJECTION, POWDER, LYOPHILIZED, FOR SOLUTION INTRAVENOUS at 12:11

## 2018-11-04 RX ADMIN — ASPIRIN 81 MG: 81 TABLET, COATED ORAL at 09:11

## 2018-11-04 RX ADMIN — AMLODIPINE BESYLATE 10 MG: 10 TABLET ORAL at 05:11

## 2018-11-04 RX ADMIN — ARFORMOTEROL TARTRATE 15 MCG: 15 SOLUTION RESPIRATORY (INHALATION) at 07:11

## 2018-11-04 RX ADMIN — HUMAN INSULIN 20 UNITS: 100 INJECTION, SUSPENSION SUBCUTANEOUS at 08:11

## 2018-11-04 RX ADMIN — HYDROCODONE BITARTRATE AND ACETAMINOPHEN 1 TABLET: 10; 325 TABLET ORAL at 09:11

## 2018-11-04 RX ADMIN — HYDRALAZINE HYDROCHLORIDE AND ISOSORBIDE DINITRATE 1 TABLET: 37.5; 2 TABLET, FILM COATED ORAL at 11:11

## 2018-11-04 RX ADMIN — HYDROCODONE BITARTRATE AND ACETAMINOPHEN 1 TABLET: 10; 325 TABLET ORAL at 01:11

## 2018-11-04 RX ADMIN — INSULIN ASPART 6 UNITS: 100 INJECTION, SOLUTION INTRAVENOUS; SUBCUTANEOUS at 06:11

## 2018-11-04 RX ADMIN — TACROLIMUS 1.5 MG: 0.5 CAPSULE ORAL at 05:11

## 2018-11-04 RX ADMIN — SODIUM BICARBONATE 650 MG TABLET 2600 MG: at 09:11

## 2018-11-04 RX ADMIN — GABAPENTIN 300 MG: 300 CAPSULE ORAL at 04:11

## 2018-11-04 RX ADMIN — METOPROLOL TARTRATE 25 MG: 25 TABLET ORAL at 09:11

## 2018-11-04 RX ADMIN — HYDRALAZINE HYDROCHLORIDE 25 MG: 25 TABLET, FILM COATED ORAL at 01:11

## 2018-11-04 RX ADMIN — HYDRALAZINE HYDROCHLORIDE AND ISOSORBIDE DINITRATE 1 TABLET: 37.5; 2 TABLET, FILM COATED ORAL at 09:11

## 2018-11-04 RX ADMIN — HYDROCODONE BITARTRATE AND ACETAMINOPHEN 1 TABLET: 10; 325 TABLET ORAL at 05:11

## 2018-11-04 NOTE — ASSESSMENT & PLAN NOTE
- with delayed wound healing secondary to non-compliance with podiatry recommendations (patient reports getting foot wet and bearing weight)  - Defer care to podiatry  -wound cultures growing Enterobacter and Enterococcus  -scheduled for wound closure on Monday 11/5/18  -Infectious Disease following  -will need LTAC vs SNF  -Will continue zyvox and zosyn   11/4/18  -Surgery planned for tomorrow  -ID following  -IV zosyn discontinued   -Continue Zyvox po and IV Rocephin added

## 2018-11-04 NOTE — ASSESSMENT & PLAN NOTE
10/31- will continue close podiatry follow up , will use cultures to guide therapy   All previous cultures reviewed-  Newer results are available. Click to view them now.   Component 3mo ago   Aerobic Bacterial Culture CITROBACTER FREUNDII   Rare       Aerobic Bacterial Culture KLEBSIELLA OXYTOCA   Rare         09/2018- MSSA  Will continue Zosyn/Vanco for now.  Vancomycin might be stopped soon    11/3- wound cultures =VRE and enterobacter -will continue Zyvox and will use Rocephin .  Will follow anaerobic cultures

## 2018-11-04 NOTE — ASSESSMENT & PLAN NOTE
- A1c 8.0 in September  -Glucose slightly uncontrolled  - Accu checks ACHS with SSI PRN  - add NPH at lower doses  - Monitor  -SSI dose increased to moderate

## 2018-11-04 NOTE — ASSESSMENT & PLAN NOTE
See plan for s/p transmetatarsal amputation of foot  Wound cultures grew VRE and enterobacter  ID following   Zyvox po and IV Rocephin .  Will follow anaerobic cultures

## 2018-11-04 NOTE — ASSESSMENT & PLAN NOTE
11/1-will follow vascular surgery on discharge      11/2- will continue vascular surgery follow up

## 2018-11-04 NOTE — PROGRESS NOTES
[facility]  Infectious Disease  Progress Note    Patient Name: Lavelle Ladd  MRN: [id number]  Admission Date: 10/31/2018  Length of Stay: 4 days  Attending Physician: Shelton Edgar MD  Primary Care Provider: Rishabh Esteban MD    Isolation Status: Contact  Assessment/Plan:      Abscess of left foot    10/31- will continue close podiatry follow up , will use cultures to guide therapy   All previous cultures reviewed-  Newer results are available. Click to view them now.   Component 3mo ago   Aerobic Bacterial Culture CITROBACTER FREUNDII   Rare       Aerobic Bacterial Culture KLEBSIELLA OXYTOCA   Rare         09/2018- MSSA  Will continue Zosyn/Vanco for now.  Vancomycin might be stopped soon    11/3- wound cultures =VRE and enterobacter -will continue Zyvox and will use Rocephin .  Will follow anaerobic cultures     Peripheral vascular disease    11/1-will follow vascular surgery on discharge      11/2- will continue vascular surgery follow up      Kidney transplant recipient    10/31- nephrology follow up   11/1- close nephrology follow up .    11/2- will continue to closely monitor , nephrology follow up     11/3- will monitor closely as he is an immunocompromised host     Stage 3 chronic kidney disease    10/31- will continue close monitoring , avoid nephrotoxic meds.    11/3- will continue close monitoring of serum creatinine , avoid nephrotic meds     Type 2 diabetes mellitus with hyperglycemia, with long-term current use of insulin    10/31- will continue close glucose control.  Insulin therapy     11/1- will continue insulin sliding scale , continue close follow up    11/2- insulin sliding scale as per primary team   11/3-  Will continue insulin sliding scale ,diabetic diet          Anticipated Disposition:     Thank you for your consult. I will follow-up with patient. Please contact us if you have any additional questions.    Ronny Veliz MD  Infectious Disease  [facility]     Subjective:     Principal Problem:S/P transmetatarsal amputation of foot, left    HPI:      63 year old  male  with a PMHx of COPD, CAD, Diastolic CHF, CKD, Renal transplant, GERD, Hepatitis C, HLD, HTN, PVD, and IDDM who presented to the hospital as a direct admit for planned surgery today per Dr. Wheeler.   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  Patient underwent delayed primary wound closure, on 9/24/18.  He presented to podiatry today for his 2 week follow-up of wound closure 2/2 to wound dehiscence.    All previous cultures reviewed -  09/21-    Wound cx on 9/21 showed MSSA  07/2018-  Newer results are available. Click to view them now.   Component 3mo ago   Aerobic Bacterial Culture CITROBACTER FREUNDII   Rare       Aerobic Bacterial Culture KLEBSIELLA OXYTOCA   Rare           He had on 10/31-   1. Left foot Irrigation and debridement to bone  2. Revisional Transmetatarsal Amputation, Left foot        Interval History: 63 year old man with diabetic foot.  All previous cultures reviewed   10/31- GNR  09/21- MSSA  He does not want LTAC   11/2- latest wound cultures -enterococcus/enterobacter   He is afebrile   11/3-wound cultures -VRE/ enterobacter   He says he does not feel well   Review of Systems   Constitutional: Negative for chills, diaphoresis, fatigue and fever.   HENT: Negative for hearing loss, mouth sores, sore throat, tinnitus and trouble swallowing.    Eyes: Negative for pain, discharge and redness.   Respiratory: Negative for apnea, cough, choking, chest tightness, shortness of breath, wheezing and stridor.    Cardiovascular: Negative for chest pain, palpitations and leg swelling.   Gastrointestinal: Negative for abdominal distention, abdominal pain, blood in stool, constipation, diarrhea, nausea, rectal pain and vomiting.   Endocrine: Negative for cold intolerance, heat intolerance, polydipsia, polyphagia and polyuria.    Genitourinary: Negative for difficulty urinating, dysuria, flank pain, frequency, hematuria and urgency.   Musculoskeletal: Negative for arthralgias, back pain, gait problem, joint swelling, neck pain and neck stiffness.   Skin: Positive for color change and wound. Negative for rash.        Left foot with worsening erythema.    Allergic/Immunologic: Negative for food allergies.   Neurological: Negative for dizziness, tremors, seizures, syncope, speech difficulty, light-headedness and headaches.   Hematological: Negative for adenopathy. Does not bruise/bleed easily.   Psychiatric/Behavioral: Negative for agitation and confusion. The patient is not nervous/anxious.    All other systems reviewed and are negative.    Objective:     Vital Signs (Most Recent):  Temp: 98.6 °F (37 °C) (11/04/18 0811)  Pulse: (!) 57 (11/04/18 0811)  Resp: 14 (11/04/18 0811)  BP: (!) 184/79 (11/04/18 0811)  SpO2: 97 % (11/04/18 0811) Vital Signs (24h Range):  Temp:  [98 °F (36.7 °C)-99.3 °F (37.4 °C)] 98.6 °F (37 °C)  Pulse:  [] 57  Resp:  [14-20] 14  SpO2:  [93 %-97 %] 97 %  BP: (125-210)/(58-99) 184/79     Weight: 99.8 kg (220 lb 0.3 oz)  Body mass index is 30.69 kg/m².    Estimated Creatinine Clearance: 55.4 mL/min (A) (based on SCr of 1.6 mg/dL (H)).    Physical Exam   Constitutional: He is oriented to person, place, and time. He appears well-developed and well-nourished. No distress.   HENT:   Head: Normocephalic and atraumatic.   Mouth/Throat: Oropharynx is clear and moist.   Eyes: Conjunctivae and EOM are normal. Pupils are equal, round, and reactive to light.   Neck: Normal range of motion. Neck supple. No JVD present.   Cardiovascular: Normal rate, regular rhythm, normal heart sounds and intact distal pulses. Exam reveals no gallop and no friction rub.   No murmur heard.  Pulmonary/Chest: Effort normal and breath sounds normal. No stridor. No respiratory distress. He has no wheezes. He has no rales. He exhibits no tenderness.    Abdominal: Soft. Bowel sounds are normal. He exhibits no distension and no mass. There is no tenderness. There is no rebound and no guarding.   Musculoskeletal: Normal range of motion. He exhibits no edema or tenderness.   Dressing over foot noted   Neurological: He is alert and oriented to person, place, and time. No cranial nerve deficit.   Skin: Skin is warm and dry. No rash noted. He is not diaphoretic. There is erythema.   -   Psychiatric: He has a normal mood and affect. His speech is normal. Judgment and thought content normal. He is withdrawn. Cognition and memory are normal. He is inattentive.   Nursing note and vitals reviewed.      Significant Labs:   Blood Culture:   Recent Labs   Lab 09/18/18  1812 09/21/18  0907 09/21/18  1516 10/31/18  1054 10/31/18  1055   LABBLOO Gram stain aer bottle: Gram positive cocci in clusters resembling Staph   Gram stain susan bottle: Gram positive cocci in clusters resembling Staph   Results called to and read back by:NOA Eckert RN 09/19/2018  15:31  STAPHYLOCOCCUS AUREUS  ID consult required at [facility] locations.  For susceptibility see order # [id number]   No growth after 5 days. No growth after 5 days. No Growth to date  No Growth to date  No Growth to date  No Growth to date No Growth to date  No Growth to date  No Growth to date  No Growth to date     BMP:   Recent Labs   Lab 11/04/18  0430   *   *   K 4.0      CO2 20*   BUN 19   CREATININE 1.6*   CALCIUM 9.3     CBC:   Recent Labs   Lab 11/03/18  0508 11/04/18  0430   WBC 8.43 5.08   HGB 11.9* 11.4*   HCT 37.7* 36.5*    180     CMP:   Recent Labs   Lab 11/03/18  0508 11/04/18  0430   * 135*   K 3.9 4.0    103   CO2 22* 20*   * 302*   BUN 21 19   CREATININE 1.5* 1.6*   CALCIUM 9.4 9.3   ANIONGAP 12 12   EGFRNONAA 48* 45*     Wound Culture:   Recent Labs   Lab 07/09/18  0856 09/21/18  1235 10/18/18  1328 10/31/18  0911 10/31/18  1640   LABAERO  CITROBACTER FREUNDII  Rare    KLEBSIELLA OXYTOCA  Rare   STAPHYLOCOCCUS AUREUS  Few  Skin skylar also present   Skin skylar,  no predominant organism ENTEROBACTER CLOACAE  Many    STENOTROPHOMONAS (X.) MALTOPHILIA  Moderate  Susceptibility pending   ENTEROCOCCUS FAECIUM VRE  Rare    ENTEROBACTER CLOACAE  Rare         Significant Imaging: I have reviewed all pertinent imaging results/findings within the past 24 hours.

## 2018-11-04 NOTE — PROGRESS NOTES
[facility]  Podiatry  Progress Note    Patient Name: Lavelle Ladd  MRN: [id number]  Admission Date: 10/31/2018  Hospital Length of Stay: 4 days  Attending Physician: Shelton Edgar MD  Primary Care Provider: Rishabh Esteban MD     Subjective:     Interval History: 63M, s/p 10/31/2018 incision and drainage to the level of bone, left foot, with revisional transmetatarsal amputation, open.  No interval change in past medical history or status since my last evaluation with this patient on Friday evening.    Follow-up For: Procedure(s) (LRB):  AMPUTATION, FOOT, TRANSMETATARSAL (Left)  IRRIGATION AND DEBRIDEMENT, LOWER EXTREMITY (Left)    Post-Operative Day: 4 Days Post-Op    Scheduled Meds:   amLODIPine  10 mg Oral Daily    arformoterol  15 mcg Nebulization Q12H    aspirin  81 mg Oral Daily    budesonide  0.5 mg Nebulization Q12H    cefTRIAXone (ROCEPHIN) IVPB  1 g Intravenous Q24H    gabapentin  300 mg Oral TID    insulin NPH  20 Units Subcutaneous BID AC    isosorbide-hydrALAZINE 20-37.5 mg  1 tablet Oral TID    linezolid  600 mg Oral Q12H    metoprolol tartrate  25 mg Oral BID    nozaseptin   Each Nare BID    predniSONE  5 mg Oral Daily    sodium bicarbonate  2,600 mg Oral BID    tacrolimus  1.5 mg Oral BID     Continuous Infusions:  PRN Meds:dextrose 50%, dextrose 50%, glucagon (human recombinant), glucose, glucose, HYDROcodone-acetaminophen, insulin aspart U-100, ondansetron, ondansetron, sodium chloride 0.9%, sodium chloride 0.9%, sodium chloride 0.9%    Review of Systems   Constitutional: Negative for chills, fatigue and fever.   HENT: Negative for hearing loss.    Eyes: Negative for photophobia and visual disturbance.   Respiratory: Negative for cough, chest tightness, shortness of breath and wheezing.    Cardiovascular: Negative for chest pain and palpitations.   Gastrointestinal: Negative for constipation, diarrhea, nausea and vomiting.   Endocrine: Negative for cold  intolerance and heat intolerance.   Genitourinary: Negative for flank pain.   Musculoskeletal: Negative for neck pain and neck stiffness.   Skin: Positive for color change and wound.   Neurological: Positive for numbness. Negative for light-headedness and headaches.   Psychiatric/Behavioral: Negative for sleep disturbance.     Objective:     Vital Signs (Most Recent):  Temp: 97.9 °F (36.6 °C) (11/04/18 1247)  Pulse: 67 (11/04/18 1247)  Resp: 18 (11/04/18 1247)  BP: 133/72 (11/04/18 1247)  SpO2: 96 % (11/04/18 1247) Vital Signs (24h Range):  Temp:  [97.9 °F (36.6 °C)-99.3 °F (37.4 °C)] 97.9 °F (36.6 °C)  Pulse:  [57-97] 67  Resp:  [14-20] 18  SpO2:  [94 %-97 %] 96 %  BP: (133-210)/(72-99) 133/72     Weight: 99.8 kg (220 lb 0.3 oz)  Body mass index is 30.69 kg/m².    Foot Exam    Laboratory:  A1C:   Recent Labs   Lab 06/12/18  0901 07/07/18  0432 09/19/18  0520   HGBA1C 9.9* 10.2* 8.0*     ABGs: No results for input(s): PH, PCO2, HCO3, POCSATURATED, BE in the last 168 hours.  Blood Cultures: No results for input(s): LABBLOO in the last 48 hours.  BMP:   Recent Labs   Lab 10/31/18  1052  11/04/18  0430   GLU  --    < > 302*   NA  --    < > 135*   K  --    < > 4.0   CL  --    < > 103   CO2  --    < > 20*   BUN  --    < > 19   CREATININE  --    < > 1.6*   CALCIUM  --    < > 9.3   MG 1.9  --   --     < > = values in this interval not displayed.     Cardiac Markers: No results for input(s): CKMB, TROPONINT, MYOGLOBIN in the last 168 hours.  CBC:   Recent Labs   Lab 11/04/18  0430   WBC 5.08   RBC 3.99*   HGB 11.4*   HCT 36.5*      MCV 92   MCH 28.6   MCHC 31.2*     CMP:   Recent Labs   Lab 10/31/18  1053  11/04/18  0430   *   < > 302*   CALCIUM 9.5   < > 9.3   ALBUMIN 3.3*  --   --    PROT 7.2  --   --       < > 135*   K 4.8   < > 4.0   CO2 23   < > 20*      < > 103   BUN 20   < > 19   CREATININE 1.5*   < > 1.6*   ALKPHOS 86  --   --    ALT 12  --   --    AST 20  --   --    BILITOT 0.6  --   --      < > = values in this interval not displayed.     Coagulation:   Recent Labs   Lab 10/31/18  1126   INR 1.0     CPS: {:LNK,LABRCNTIP[  CRP:   Recent Labs   Lab 10/31/18  1053   CRP 1.2     ESR:   Recent Labs   Lab 10/31/18  1054   SEDRATE 14*     LFTs:   Recent Labs   Lab 10/31/18  1053   ALT 12   AST 20   ALKPHOS 86   BILITOT 0.6   PROT 7.2   ALBUMIN 3.3*     Prealbumin: No results for input(s): PREALBUMIN in the last 48 hours.  Wound Cultures:   Recent Labs   Lab 07/09/18  0856 09/21/18  1235 10/18/18  1328 10/31/18  0911 10/31/18  1640   LABAERO CITROBACTER FREUNDII  Rare    KLEBSIELLA OXYTOCA  Rare   STAPHYLOCOCCUS AUREUS  Few  Skin skylar also present   Skin skylar,  no predominant organism ENTEROBACTER CLOACAE  Many    STENOTROPHOMONAS (X.) MALTOPHILIA  Moderate  Susceptibility pending   ENTEROCOCCUS FAECIUM VRE  Rare    ENTEROBACTER CLOACAE  Rare       Microbiology Results (last 7 days)     Procedure Component Value Units Date/Time    Blood culture [[id number]] Collected:  10/31/18 1055    Order Status:  Completed Specimen:  Blood Updated:  11/03/18 1812     Blood Culture, Routine No Growth to date     Blood Culture, Routine No Growth to date     Blood Culture, Routine No Growth to date     Blood Culture, Routine No Growth to date    Blood culture [[id number]] Collected:  10/31/18 1054    Order Status:  Completed Specimen:  Blood Updated:  11/03/18 1812     Blood Culture, Routine No Growth to date     Blood Culture, Routine No Growth to date     Blood Culture, Routine No Growth to date     Blood Culture, Routine No Growth to date    Aerobic culture [[id number]]  (Susceptibility) Collected:  10/31/18 1640    Order Status:  Completed Specimen:  Incision site from Foot, Left Updated:  11/03/18 1139     Aerobic Bacterial Culture --     ENTEROCOCCUS FAECIUM VRE  Rare       Aerobic Bacterial Culture --     ENTEROBACTER CLOACAE  Rare      Culture, Anaerobic [[id number]] Collected:  10/31/18 1640    Order Status:   Completed Specimen:  Incision site from Foot, Left Updated:  11/02/18 0746     Anaerobic Culture Culture in progress    Blood culture [[id number]]     Order Status:  Canceled Specimen:  Blood     Blood culture [[id number]]     Order Status:  Canceled Specimen:  Blood         Specimen (12h ago, onward)    None          Diagnostic Results:  I have reviewed all pertinent imaging results/findings within the past 24 hours.    Clinical Findings:  The skin edges appear viable to the left transmetatarsal amputation stump.  Probe to bone with osseous exposure.  No malodor fluctuance is noted. No purulence.  Slight fibrosis to the plantar flap at the subcutaneous tissue level.  No drainage is noted. No malodor.  Resolved periwound erythema cellulitis noted. No overt edema is noted.    Assessment/Plan:     Active Diagnoses:    Diagnosis Date Noted POA    PRINCIPAL PROBLEM:  S/P transmetatarsal amputation of foot, left [Z89.432] 10/31/2018 Not Applicable    Immunosuppression [D89.9]  Yes    Hx of right BKA [Z89.511] 11/01/2018 Not Applicable    Abscess of left foot [L02.612] 10/31/2018 Unknown    Non compliance with medical treatment [Z91.19] 10/31/2018 Not Applicable    Cellulitis of left foot [L03.116] 07/20/2018 Yes    Peripheral vascular disease [I73.9] 07/06/2018 Yes    Chronic bilateral low back pain with left-sided sciatica [M54.42, G89.29] 01/25/2018 Yes    Kidney transplant recipient [Z94.0] 04/07/2017 Not Applicable    Stage 3 chronic kidney disease [N18.3] 09/25/2016 Yes     Chronic    Chronic obstructive pulmonary disease [J44.9] 09/12/2016 Yes    Coronary artery disease involving native coronary artery of native heart without angina pectoris [I25.10] 07/01/2016 Yes    Type 2 diabetes mellitus with hyperglycemia, with long-term current use of insulin [E11.65, Z79.4]  Not Applicable    Essential hypertension [I10] 02/20/2015 Yes      Problems Resolved During this Admission:       At this time, patient has  open transmetatarsal amputation site is amenable to secondary closure with subsequent debridement of subcutaneous tissues and possible further bone resection.  Continue intravenous antibiotics for now.  Limit weight-bearing ambulation to the left foot. Will place NPO for procedure on tomorrow with Dr. Wheeler. PT and OT.  Local wound care this afternoon flush irrigation and packing.    Tex Guthrie, LASHONDA  Podiatry  [facility]

## 2018-11-04 NOTE — PROGRESS NOTES
Pt's BP has remained elevated post PRN Hydralazine and pain medication. Notified Theodora Dumont NP. New orders for one time dose of Clonidine 0.2 mg PO. Will re-check BP

## 2018-11-04 NOTE — SUBJECTIVE & OBJECTIVE
Interval History: 63 year old man with diabetic foot.  All previous cultures reviewed   10/31- GNR  09/21- MSSA  He does not want LTAC   11/2- latest wound cultures -enterococcus/enterobacter   He is afebrile   11/3-wound cultures -VRE/ enterobacter   He says he does not feel well   Review of Systems   Constitutional: Negative for chills, diaphoresis, fatigue and fever.   HENT: Negative for hearing loss, mouth sores, sore throat, tinnitus and trouble swallowing.    Eyes: Negative for pain, discharge and redness.   Respiratory: Negative for apnea, cough, choking, chest tightness, shortness of breath, wheezing and stridor.    Cardiovascular: Negative for chest pain, palpitations and leg swelling.   Gastrointestinal: Negative for abdominal distention, abdominal pain, blood in stool, constipation, diarrhea, nausea, rectal pain and vomiting.   Endocrine: Negative for cold intolerance, heat intolerance, polydipsia, polyphagia and polyuria.   Genitourinary: Negative for difficulty urinating, dysuria, flank pain, frequency, hematuria and urgency.   Musculoskeletal: Negative for arthralgias, back pain, gait problem, joint swelling, neck pain and neck stiffness.   Skin: Positive for color change and wound. Negative for rash.        Left foot with worsening erythema.    Allergic/Immunologic: Negative for food allergies.   Neurological: Negative for dizziness, tremors, seizures, syncope, speech difficulty, light-headedness and headaches.   Hematological: Negative for adenopathy. Does not bruise/bleed easily.   Psychiatric/Behavioral: Negative for agitation and confusion. The patient is not nervous/anxious.    All other systems reviewed and are negative.    Objective:     Vital Signs (Most Recent):  Temp: 98.6 °F (37 °C) (11/04/18 0811)  Pulse: (!) 57 (11/04/18 0811)  Resp: 14 (11/04/18 0811)  BP: (!) 184/79 (11/04/18 0811)  SpO2: 97 % (11/04/18 0811) Vital Signs (24h Range):  Temp:  [98 °F (36.7 °C)-99.3 °F (37.4 °C)] 98.6 °F  (37 °C)  Pulse:  [] 57  Resp:  [14-20] 14  SpO2:  [93 %-97 %] 97 %  BP: (125-210)/(58-99) 184/79     Weight: 99.8 kg (220 lb 0.3 oz)  Body mass index is 30.69 kg/m².    Estimated Creatinine Clearance: 55.4 mL/min (A) (based on SCr of 1.6 mg/dL (H)).    Physical Exam   Constitutional: He is oriented to person, place, and time. He appears well-developed and well-nourished. No distress.   HENT:   Head: Normocephalic and atraumatic.   Mouth/Throat: Oropharynx is clear and moist.   Eyes: Conjunctivae and EOM are normal. Pupils are equal, round, and reactive to light.   Neck: Normal range of motion. Neck supple. No JVD present.   Cardiovascular: Normal rate, regular rhythm, normal heart sounds and intact distal pulses. Exam reveals no gallop and no friction rub.   No murmur heard.  Pulmonary/Chest: Effort normal and breath sounds normal. No stridor. No respiratory distress. He has no wheezes. He has no rales. He exhibits no tenderness.   Abdominal: Soft. Bowel sounds are normal. He exhibits no distension and no mass. There is no tenderness. There is no rebound and no guarding.   Musculoskeletal: Normal range of motion. He exhibits no edema or tenderness.   Dressing over foot noted   Neurological: He is alert and oriented to person, place, and time. No cranial nerve deficit.   Skin: Skin is warm and dry. No rash noted. He is not diaphoretic. There is erythema.   -   Psychiatric: He has a normal mood and affect. His speech is normal. Judgment and thought content normal. He is withdrawn. Cognition and memory are normal. He is inattentive.   Nursing note and vitals reviewed.      Significant Labs:   Blood Culture:   Recent Labs   Lab 09/18/18  1812 09/21/18  0907 09/21/18  1516 10/31/18  1054 10/31/18  1055   LABBLOO Gram stain aer bottle: Gram positive cocci in clusters resembling Staph   Gram stain susan bottle: Gram positive cocci in clusters resembling Staph   Results called to and read back by:NOA Eckert RN  09/19/2018  15:31  STAPHYLOCOCCUS AUREUS  ID consult required at [facility].  For susceptibility see order # [id number]   No growth after 5 days. No growth after 5 days. No Growth to date  No Growth to date  No Growth to date  No Growth to date No Growth to date  No Growth to date  No Growth to date  No Growth to date     BMP:   Recent Labs   Lab 11/04/18  0430   *   *   K 4.0      CO2 20*   BUN 19   CREATININE 1.6*   CALCIUM 9.3     CBC:   Recent Labs   Lab 11/03/18  0508 11/04/18  0430   WBC 8.43 5.08   HGB 11.9* 11.4*   HCT 37.7* 36.5*    180     CMP:   Recent Labs   Lab 11/03/18  0508 11/04/18  0430   * 135*   K 3.9 4.0    103   CO2 22* 20*   * 302*   BUN 21 19   CREATININE 1.5* 1.6*   CALCIUM 9.4 9.3   ANIONGAP 12 12   EGFRNONAA 48* 45*     Wound Culture:   Recent Labs   Lab 07/09/18  0856 09/21/18  1235 10/18/18  1328 10/31/18  0911 10/31/18  1640   LABAERO CITROBACTER FREUNDII  Rare    KLEBSIELLA OXYTOCA  Rare   STAPHYLOCOCCUS AUREUS  Few  Skin skylar also present   Skin skylar,  no predominant organism ENTEROBACTER CLOACAE  Many    STENOTROPHOMONAS (X.) MALTOPHILIA  Moderate  Susceptibility pending   ENTEROCOCCUS FAECIUM VRE  Rare    ENTEROBACTER CLOACAE  Rare         Significant Imaging: I have reviewed all pertinent imaging results/findings within the past 24 hours.

## 2018-11-04 NOTE — ASSESSMENT & PLAN NOTE
10/31- will continue close monitoring , avoid nephrotoxic meds.    11/3- will continue close monitoring of serum creatinine , avoid nephrotic meds

## 2018-11-04 NOTE — ASSESSMENT & PLAN NOTE
10/31- nephrology follow up   11/1- close nephrology follow up .    11/2- will continue to closely monitor , nephrology follow up     11/3- will monitor closely as he is an immunocompromised host

## 2018-11-04 NOTE — ASSESSMENT & PLAN NOTE
-Creatinine 1.8 today. Slight increase. Closely monitor  - Daily BMP  - Monitor  -Creatinine 1.5 at baseline   -Creatinine stable  -Nephrology following

## 2018-11-04 NOTE — ASSESSMENT & PLAN NOTE
10/31- will continue close glucose control.  Insulin therapy     11/1- will continue insulin sliding scale , continue close follow up    11/2- insulin sliding scale as per primary team   11/3-  Will continue insulin sliding scale ,diabetic diet

## 2018-11-04 NOTE — ASSESSMENT & PLAN NOTE
- Currently appears compensated  - Continue home meds  - Supplemental O2 PRN  - Monitor  -No change

## 2018-11-04 NOTE — ASSESSMENT & PLAN NOTE
- Educated on the importance of adhering to medical treatment plan  - Patient has a limited support system  - Social work consult for DC planning

## 2018-11-04 NOTE — PROGRESS NOTES
[facility] -   Nephrology  Progress Note    Patient Name: Lavelle Ladd  MRN: [id number]  Admission Date: 10/31/2018  Hospital Length of Stay: 4 days  Attending Provider: Shelton Edgar MD   Primary Care Physician: Rishabh Esteban MD  Principal Problem:S/P transmetatarsal amputation of foot, left    Subjective:     HPI: Lavelle Ladd is a pleasant 63 y old  man  , s/p   donor ( brain death ) kidney transplant on 16.  He has CKD stage 3 - GFR 30-59 and his baseline creatinine is between 1.5-1.8. He takes  prednisone 5 mg daily and tacrolimus 1.5 mg twice a day , for maintenance immunosuppression.  He he is also on CellCept 500 mg twice a day, will hold this medication due to cellulitis of his left foot, he is status post left transmetatarsal amputation on 2018, patient was seen for follow-up appointment in the clinic yesterday and notice that his foot was infected, patient was admitted to the hospital for further management,      Interval History: Pt was seen and examined. No new events, stable last night, no c/o's this am.    Review of patient's allergies indicates:  No Known Allergies  Current Facility-Administered Medications   Medication Frequency    amLODIPine tablet 10 mg Daily    arformoterol nebulizer solution 15 mcg Q12H    aspirin EC tablet 81 mg Daily    budesonide nebulizer solution 0.5 mg Q12H    cefTRIAXone (ROCEPHIN) 1 g in dextrose 5 % 50 mL IVPB Q24H    dextrose 50% injection 12.5 g PRN    dextrose 50% injection 25 g PRN    gabapentin capsule 300 mg TID    glucagon (human recombinant) injection 1 mg PRN    glucose chewable tablet 16 g PRN    glucose chewable tablet 24 g PRN    HYDROcodone-acetaminophen  mg per tablet 1 tablet Q4H PRN    insulin aspart U-100 pen 0-5 Units QID (AC + HS) PRN    insulin NPH injection 20 Units BID AC    isosorbide-hydrALAZINE 20-37.5 mg per tablet 1 tablet TID    linezolid tablet 600 mg Q12H     metoprolol tartrate (LOPRESSOR) tablet 25 mg BID    nozaseptin (NOZIN) nasal  BID    ondansetron disintegrating tablet 8 mg Q8H PRN    ondansetron injection 4 mg Q8H PRN    predniSONE tablet 5 mg Daily    sodium bicarbonate tablet 2,600 mg BID    sodium chloride 0.9% flush 3 mL PRN    sodium chloride 0.9% flush 3 mL PRN    sodium chloride 0.9% flush 5 mL PRN    tacrolimus capsule 1.5 mg BID       Objective:     Vital Signs (Most Recent):  Temp: 97.9 °F (36.6 °C) (11/04/18 1247)  Pulse: 67 (11/04/18 1247)  Resp: 18 (11/04/18 1247)  BP: 133/72 (11/04/18 1247)  SpO2: 96 % (11/04/18 1247)  O2 Device (Oxygen Therapy): room air (11/04/18 0811) Vital Signs (24h Range):  Temp:  [97.9 °F (36.6 °C)-99.3 °F (37.4 °C)] 97.9 °F (36.6 °C)  Pulse:  [57-97] 67  Resp:  [14-20] 18  SpO2:  [94 %-97 %] 96 %  BP: (133-210)/(72-99) 133/72     Weight: 99.8 kg (220 lb 0.3 oz) (10/31/18 1049)  Body mass index is 30.69 kg/m².  Body surface area is 2.24 meters squared.    I/O last 3 completed shifts:  In: 1745 [P.O.:1445; IV Piggyback:300]  Out: 2300 [Urine:2300]    Physical Exam   Constitutional: He is oriented to person, place, and time. He appears well-developed and well-nourished.   HENT:   Head: Normocephalic and atraumatic.   Neck: No JVD present.   Cardiovascular: Normal rate, regular rhythm and normal heart sounds.   Pulmonary/Chest: No respiratory distress. He has no rales.   Abdominal: Soft. He exhibits no distension.   Musculoskeletal: He exhibits no edema.   Neurological: He is oriented to person, place, and time.   Skin: Skin is dry.   Psychiatric: He has a normal mood and affect. His behavior is normal.   Nursing note and vitals reviewed.      Significant Labs: reviewed  BMP  Lab Results   Component Value Date     (L) 11/04/2018    K 4.0 11/04/2018     11/04/2018    CO2 20 (L) 11/04/2018    BUN 19 11/04/2018    CREATININE 1.6 (H) 11/04/2018    CALCIUM 9.3 11/04/2018    ANIONGAP 12 11/04/2018     ESTGFRAFRICA 51 (A) 11/04/2018    EGFRNONAA 45 (A) 11/04/2018     Lab Results   Component Value Date    WBC 5.08 11/04/2018    HGB 11.4 (L) 11/04/2018    HCT 36.5 (L) 11/04/2018    MCV 92 11/04/2018     11/04/2018       Significant Imaging: reviewed    Assessment/Plan:         62 y/o male with a h/o of KTx has wound infection after amputation:                  Kidney transplant recipient     CKD stage 3. stable renal function, no change, no worsening  s Cr stable  K normal  Metabolic acidosis, mild, stable  Hyponatremia, mild, stable     H/o of cadaveric kidney transplant in May 2016  Doing well, stable hemodynamically  On immunosuppressive therapy  Last prograf level was within the therapeutic range  No change in dose of prograf, continue same dose  Cellcept is on hold, keep on hold, due to current and active infection     HTN : BP well controlled after amlodipine dose increase  Meds reviewed     H/o of DM  Diabetic nephropathy               * Abscess of left foot     Left foot wound infection post mid foot amputation  Wound cx growing Enterococcus and Enterobacter  Abx reviewed   Agree with current mgmt  Continue zosyn and zyvox             Plans and recommendations:  As discussed above  Will f/u closely.               Maureen Cherry MD  Nephrology  [facility] -

## 2018-11-04 NOTE — SUBJECTIVE & OBJECTIVE
Interval History: No acute events overnight. Patient awake and alert today. Plan for secondary wound closure by .     Review of Systems   Constitutional: Negative for chills, diaphoresis, fatigue and fever.   HENT: Negative for hearing loss, mouth sores, sore throat, tinnitus and trouble swallowing.    Eyes: Negative for pain, discharge and redness.   Respiratory: Negative for apnea, cough, choking, chest tightness, shortness of breath, wheezing and stridor.    Cardiovascular: Negative for chest pain, palpitations and leg swelling.   Gastrointestinal: Negative for abdominal distention, abdominal pain, blood in stool, constipation, diarrhea, nausea, rectal pain and vomiting.   Endocrine: Negative for cold intolerance, heat intolerance, polydipsia, polyphagia and polyuria.   Genitourinary: Negative for difficulty urinating, dysuria, flank pain, frequency, hematuria and urgency.   Musculoskeletal: Negative for arthralgias, back pain, gait problem, joint swelling, neck pain and neck stiffness.   Skin: Positive for color change and wound. Negative for rash.   Allergic/Immunologic: Negative for food allergies.   Neurological: Negative for dizziness, tremors, seizures, syncope, speech difficulty, light-headedness and headaches.   Hematological: Negative for adenopathy. Does not bruise/bleed easily.   Psychiatric/Behavioral: Negative for agitation and confusion. The patient is not nervous/anxious.    All other systems reviewed and are negative.    Objective:     Vital Signs (Most Recent):  Temp: 97.9 °F (36.6 °C) (11/04/18 1247)  Pulse: 67 (11/04/18 1247)  Resp: 18 (11/04/18 1247)  BP: 133/72 (11/04/18 1247)  SpO2: 96 % (11/04/18 1247) Vital Signs (24h Range):  Temp:  [97.9 °F (36.6 °C)-99.3 °F (37.4 °C)] 97.9 °F (36.6 °C)  Pulse:  [57-97] 67  Resp:  [14-20] 18  SpO2:  [94 %-97 %] 96 %  BP: (133-210)/(72-99) 133/72     Weight: 99.8 kg (220 lb 0.3 oz)  Body mass index is 30.69 kg/m².    Intake/Output Summary  (Last 24 hours) at 11/4/2018 1455  Last data filed at 11/4/2018 1000  Gross per 24 hour   Intake 940 ml   Output 1950 ml   Net -1010 ml      Physical Exam   Constitutional: He is oriented to person, place, and time. He appears well-developed and well-nourished. No distress.   HENT:   Head: Normocephalic and atraumatic.   Mouth/Throat: Oropharynx is clear and moist.   Eyes: Conjunctivae and EOM are normal. Pupils are equal, round, and reactive to light.   Neck: Normal range of motion. Neck supple. No JVD present.   Cardiovascular: Normal rate, regular rhythm, normal heart sounds and intact distal pulses. Exam reveals no gallop and no friction rub.   No murmur heard.  Pulmonary/Chest: Effort normal and breath sounds normal. No stridor. No respiratory distress. He has no wheezes. He has no rales. He exhibits no tenderness.   Abdominal: Soft. Bowel sounds are normal. He exhibits no distension and no mass. There is no tenderness. There is no rebound and no guarding.   Musculoskeletal: Normal range of motion. He exhibits no edema or tenderness.   Neurological: He is alert and oriented to person, place, and time. No cranial nerve deficit.   Skin: Skin is warm and dry. No rash noted. He is not diaphoretic. No erythema.   LLE dressing CDI.    Psychiatric: He has a normal mood and affect. His speech is normal and behavior is normal. Judgment and thought content normal. Cognition and memory are normal.   Nursing note and vitals reviewed.      Significant Labs:   Blood Culture: No results for input(s): LABBLOO in the last 48 hours.  BMP:   Recent Labs   Lab 11/04/18  0430   *   *   K 4.0      CO2 20*   BUN 19   CREATININE 1.6*   CALCIUM 9.3     CBC:   Recent Labs   Lab 11/03/18  0508 11/04/18  0430   WBC 8.43 5.08   HGB 11.9* 11.4*   HCT 37.7* 36.5*    180     All pertinent labs within the past 24 hours have been reviewed.    Significant Imaging: I have reviewed all pertinent imaging results/findings  within the past 24 hours.

## 2018-11-04 NOTE — PROGRESS NOTES
[facility] Hospital Medicine  Progress Note    Patient Name: Lavelle Ladd  MRN: [id number]  Patient Class: IP- Inpatient   Admission Date: 10/31/2018  Length of Stay: 4 days  Attending Physician: Shelton Edgar MD  Primary Care Provider: Rishabh Esteban MD        Subjective:     Principal Problem:S/P transmetatarsal amputation of foot, left    HPI:  Lavelle Ladd is a 63 y.o. male  with a PMHx of COPD, CAD, Diastolic CHF, CKD, Renal transplant, GERD, Hepatitis C, HLD, HTN, PVD, and IDDM who presented to the hospital today as a direct admit for planned surgery today per Dr. Wheeler.  Left foot xray today showed amputation of the distal aspect of the left foot at the level of the metatarsal bones.  No lytic abnormalities to suggest osteomyelitis by plain radiograph.  No evidence of acute fracture or dislocation.  Bony mineralization is normal.  Diffuse soft tissue and dense vascular calcifications.  Large plantar calcaneal spur.  He is s/p left transmetatarsal amputation secondary to gangrene of multiple digits and JOSE R.  Patient underwent delayed primary wound closure, on 9/24/18.  He presented to podiatry today for his 2 week follow-up of wound closure 2/2 to wound dehiscence.  Patient missed his last postoperative visit as he states he was unable to get a ride to his appointment.  He also admits to ambulating on his amputation site.  He drove himself to clinic today as he was unable to get a ride.  He states that he has gotten his foot wet as he has taken showers covering extremity in a garbage bag.  He denies any fever, chills, foot pain, CP, SOB, N/V/D, and all other symptoms at this time.  Patient has a history of poor compliance and difficult living dispo as he lives alone.  In the past we did try to get patient admitted to skilled nursing facility, but he has refused such services.   Wound cx on 9/21 showed MSSA for which he has completed IV cefazolin for 14 days secondary to MSSA  gangrene.  He has been NPO since midnight.  He will be admitted to the Medicine unit under the care of Hospital Medicine.  ID consulted per podiatry recs to guide antibiotic therapy postoperatively.  He is a [employer].  His SDM is his sister Kecia Williamson who can be reached at [phone].                Hospital Course:  11/1/18 The patient is S/P I&D of left foot yesterday. No acute issues overnight. The patient reports that his pain is well controlled. Continue IV ABX, cultures pending. Infectious disease is following. 11/2/18 wound culture growing Enterococcus and Enterobacter. Podiatry recommends SNF vs LTAC placement due to potential for limb loss. 11/3/18- Will continue zyvox and zosyn. Creatinine level at baseline. Surgery planned for Monday by Dr. Wheeler for secondary wound closure. 11/4/18- Patient awake and alert today. Dr. Guthrie at bedside performing wound care to left transmetatarsal amputation stump. Patient tolerated well. Plan for surgery tomorrow. Continue zyvox, IV zosyn discontinued and IV Rocephin added.                Interval History: No acute events overnight. Patient awake and alert today. Plan for secondary wound closure by .     Review of Systems   Constitutional: Negative for chills, diaphoresis, fatigue and fever.   HENT: Negative for hearing loss, mouth sores, sore throat, tinnitus and trouble swallowing.    Eyes: Negative for pain, discharge and redness.   Respiratory: Negative for apnea, cough, choking, chest tightness, shortness of breath, wheezing and stridor.    Cardiovascular: Negative for chest pain, palpitations and leg swelling.   Gastrointestinal: Negative for abdominal distention, abdominal pain, blood in stool, constipation, diarrhea, nausea, rectal pain and vomiting.   Endocrine: Negative for cold intolerance, heat intolerance, polydipsia, polyphagia and polyuria.   Genitourinary: Negative for difficulty urinating, dysuria, flank pain, frequency, hematuria  and urgency.   Musculoskeletal: Negative for arthralgias, back pain, gait problem, joint swelling, neck pain and neck stiffness.   Skin: Positive for color change and wound. Negative for rash.   Allergic/Immunologic: Negative for food allergies.   Neurological: Negative for dizziness, tremors, seizures, syncope, speech difficulty, light-headedness and headaches.   Hematological: Negative for adenopathy. Does not bruise/bleed easily.   Psychiatric/Behavioral: Negative for agitation and confusion. The patient is not nervous/anxious.    All other systems reviewed and are negative.    Objective:     Vital Signs (Most Recent):  Temp: 97.9 °F (36.6 °C) (11/04/18 1247)  Pulse: 67 (11/04/18 1247)  Resp: 18 (11/04/18 1247)  BP: 133/72 (11/04/18 1247)  SpO2: 96 % (11/04/18 1247) Vital Signs (24h Range):  Temp:  [97.9 °F (36.6 °C)-99.3 °F (37.4 °C)] 97.9 °F (36.6 °C)  Pulse:  [57-97] 67  Resp:  [14-20] 18  SpO2:  [94 %-97 %] 96 %  BP: (133-210)/(72-99) 133/72     Weight: 99.8 kg (220 lb 0.3 oz)  Body mass index is 30.69 kg/m².    Intake/Output Summary (Last 24 hours) at 11/4/2018 1455  Last data filed at 11/4/2018 1000  Gross per 24 hour   Intake 940 ml   Output 1950 ml   Net -1010 ml      Physical Exam   Constitutional: He is oriented to person, place, and time. He appears well-developed and well-nourished. No distress.   HENT:   Head: Normocephalic and atraumatic.   Mouth/Throat: Oropharynx is clear and moist.   Eyes: Conjunctivae and EOM are normal. Pupils are equal, round, and reactive to light.   Neck: Normal range of motion. Neck supple. No JVD present.   Cardiovascular: Normal rate, regular rhythm, normal heart sounds and intact distal pulses. Exam reveals no gallop and no friction rub.   No murmur heard.  Pulmonary/Chest: Effort normal and breath sounds normal. No stridor. No respiratory distress. He has no wheezes. He has no rales. He exhibits no tenderness.   Abdominal: Soft. Bowel sounds are normal. He exhibits no  distension and no mass. There is no tenderness. There is no rebound and no guarding.   Musculoskeletal: Normal range of motion. He exhibits no edema or tenderness.   Neurological: He is alert and oriented to person, place, and time. No cranial nerve deficit.   Skin: Skin is warm and dry. No rash noted. He is not diaphoretic. No erythema.   LLE dressing CDI.    Psychiatric: He has a normal mood and affect. His speech is normal and behavior is normal. Judgment and thought content normal. Cognition and memory are normal.   Nursing note and vitals reviewed.      Significant Labs:   Blood Culture: No results for input(s): LABBLOO in the last 48 hours.  BMP:   Recent Labs   Lab 11/04/18  0430   *   *   K 4.0      CO2 20*   BUN 19   CREATININE 1.6*   CALCIUM 9.3     CBC:   Recent Labs   Lab 11/03/18  0508 11/04/18  0430   WBC 8.43 5.08   HGB 11.9* 11.4*   HCT 37.7* 36.5*    180     All pertinent labs within the past 24 hours have been reviewed.    Significant Imaging: I have reviewed all pertinent imaging results/findings within the past 24 hours.    Assessment/Plan:      * S/P transmetatarsal amputation of foot, left    - with delayed wound healing secondary to non-compliance with podiatry recommendations (patient reports getting foot wet and bearing weight)  - Defer care to podiatry  -wound cultures growing Enterobacter and Enterococcus  -scheduled for wound closure on Monday 11/5/18  -Infectious Disease following  -will need LTAC vs SNF  -Will continue zyvox and zosyn   11/4/18  -Surgery planned for tomorrow  -ID following  -IV zosyn discontinued   -Continue Zyvox po and IV Rocephin added      Immunosuppression    Hx of kidney transplant   Continue prednisone and Prograf per Nephrology       Hx of right BKA    - Site is intact  -No s/s of infection          Non compliance with medical treatment    - Educated on the importance of adhering to medical treatment plan  - Patient has a limited  support system  - Social work consult for DC planning       Abscess of left foot    See plan for s/p transmetatarsal amputation of foot  Wound cultures grew VRE and enterobacter  ID following   Zyvox po and IV Rocephin .  Will follow anaerobic cultures       Cellulitis of left foot    - See plan as per above         Peripheral vascular disease    - Resume home ASA   - Vascular f/u as needed       Chronic bilateral low back pain with left-sided sciatica    - Resume home Norco PRN  - Monitor  -Pain controlled     Kidney transplant recipient    - patient is noted to be a poor historian and does not know what medications he takes at home  - per d/w Edmundo Hernandez today, he is currently prescribed Prednisone 5 mg daily, Cellcept 500 mg BID, and Prograf 1.5 mg BID  - Nephrology consulted  - Continue home Prednisone and Prograf for now  - Hold Cellcept per Dr. Staley  - Monitor Prograf levels   - Followed by Dr. Blanco in [place]   -Continue current medical management plan   -No change in plan        Stage 3 chronic kidney disease    -Creatinine 1.8 today. Slight increase. Closely monitor  - Daily BMP  - Monitor  -Creatinine 1.5 at baseline   -Creatinine stable  -Nephrology following        Chronic obstructive pulmonary disease    - Currently appears compensated  - Continue home meds  - Supplemental O2 PRN  - Monitor  -No change      Coronary artery disease involving native coronary artery of native heart without angina pectoris    -Continue Cardiac medications  -Cardiac monitoring  -Stable        Type 2 diabetes mellitus with hyperglycemia, with long-term current use of insulin    - A1c 8.0 in September  -Glucose slightly uncontrolled  - Accu checks ACHS with SSI PRN  - add NPH at lower doses  - Monitor  -SSI dose increased to moderate        Essential hypertension    - BP well controlled  - Continue home Norvasc and Metoprolol  - Monitor  -Blood pressure needing better control, bidil added        VTE Risk Mitigation  (From admission, onward)        Ordered     IP VTE HIGH RISK PATIENT  Once      10/31/18 1731     Place sequential compression device  Until discontinued      10/31/18 1108              Carla Irby NP  Department of Hospital Medicine   [facility]

## 2018-11-04 NOTE — ASSESSMENT & PLAN NOTE
- patient is noted to be a poor historian and does not know what medications he takes at home  - per d/w Edmundo Hernandez today, he is currently prescribed Prednisone 5 mg daily, Cellcept 500 mg BID, and Prograf 1.5 mg BID  - Nephrology consulted  - Continue home Prednisone and Prograf for now  - Hold Cellcept per Dr. Staley  - Monitor Prograf levels   - Followed by Dr. Blanco in [place]   -Continue current medical management plan   -No change in plan

## 2018-11-04 NOTE — ASSESSMENT & PLAN NOTE
64 y/o male with a h/o of KTx has wound infection after amputation:                  Kidney transplant recipient     1. H/o of CKD stage 3. stable renal function  s Cr noted lower today.   K normal  Metabolic acidosis, mild, stable  Mild hyponatremia noted, stable     H/o of cadaveric kidney transplant in May 2016  Doing well, stable hemodynamically  On immunosuppressive therapy  Prograf level is within the therapeutic range  No change in dose of prograf  Cellcept is on hold, keep on hold, due to current and active infection     HTN : BP controlled now after amlodipine dose increase  Meds reviewed     H/o of DM  Diabetic nephropathy               * Abscess of left foot     Left foot wound infection post mid foot amputation  Wound cx growing Enterococcus and Enterobacter  Abx reviewed   Agree with current mgmt  Continue zosyn and zyvox             Plans and recommendations:  As discussed above  Will f/u closely.

## 2018-11-04 NOTE — ASSESSMENT & PLAN NOTE
- BP well controlled  - Continue home Norvasc and Metoprolol  - Monitor  -Blood pressure needing better control, bidil added

## 2018-11-04 NOTE — PROGRESS NOTES
Pt's BP now 174/73. Notified Theodora. New orders to increase daily amlodopine to 10 mg daily and give dose now.

## 2018-11-04 NOTE — SUBJECTIVE & OBJECTIVE
Interval History: Pt was seen and examined. No new events, stable last night, no c/o's this am.    Review of patient's allergies indicates:  No Known Allergies  Current Facility-Administered Medications   Medication Frequency    amLODIPine tablet 10 mg Daily    arformoterol nebulizer solution 15 mcg Q12H    aspirin EC tablet 81 mg Daily    budesonide nebulizer solution 0.5 mg Q12H    cefTRIAXone (ROCEPHIN) 1 g in dextrose 5 % 50 mL IVPB Q24H    dextrose 50% injection 12.5 g PRN    dextrose 50% injection 25 g PRN    gabapentin capsule 300 mg TID    glucagon (human recombinant) injection 1 mg PRN    glucose chewable tablet 16 g PRN    glucose chewable tablet 24 g PRN    HYDROcodone-acetaminophen  mg per tablet 1 tablet Q4H PRN    insulin aspart U-100 pen 0-5 Units QID (AC + HS) PRN    insulin NPH injection 20 Units BID AC    isosorbide-hydrALAZINE 20-37.5 mg per tablet 1 tablet TID    linezolid tablet 600 mg Q12H    metoprolol tartrate (LOPRESSOR) tablet 25 mg BID    nozaseptin (NOZIN) nasal  BID    ondansetron disintegrating tablet 8 mg Q8H PRN    ondansetron injection 4 mg Q8H PRN    predniSONE tablet 5 mg Daily    sodium bicarbonate tablet 2,600 mg BID    sodium chloride 0.9% flush 3 mL PRN    sodium chloride 0.9% flush 3 mL PRN    sodium chloride 0.9% flush 5 mL PRN    tacrolimus capsule 1.5 mg BID       Objective:     Vital Signs (Most Recent):  Temp: 97.9 °F (36.6 °C) (11/04/18 1247)  Pulse: 67 (11/04/18 1247)  Resp: 18 (11/04/18 1247)  BP: 133/72 (11/04/18 1247)  SpO2: 96 % (11/04/18 1247)  O2 Device (Oxygen Therapy): room air (11/04/18 0811) Vital Signs (24h Range):  Temp:  [97.9 °F (36.6 °C)-99.3 °F (37.4 °C)] 97.9 °F (36.6 °C)  Pulse:  [57-97] 67  Resp:  [14-20] 18  SpO2:  [94 %-97 %] 96 %  BP: (133-210)/(72-99) 133/72     Weight: 99.8 kg (220 lb 0.3 oz) (10/31/18 1049)  Body mass index is 30.69 kg/m².  Body surface area is 2.24 meters squared.    I/O last 3 completed  shifts:  In: 1745 [P.O.:1445; IV Piggyback:300]  Out: 2300 [Urine:2300]    Physical Exam   Constitutional: He is oriented to person, place, and time. He appears well-developed and well-nourished.   HENT:   Head: Normocephalic and atraumatic.   Neck: No JVD present.   Cardiovascular: Normal rate, regular rhythm and normal heart sounds.   Pulmonary/Chest: No respiratory distress. He has no rales.   Abdominal: Soft. He exhibits no distension.   Musculoskeletal: He exhibits no edema.   Neurological: He is oriented to person, place, and time.   Skin: Skin is dry.   Psychiatric: He has a normal mood and affect. His behavior is normal.   Nursing note and vitals reviewed.      Significant Labs: reviewed  BMP  Lab Results   Component Value Date     (L) 11/04/2018    K 4.0 11/04/2018     11/04/2018    CO2 20 (L) 11/04/2018    BUN 19 11/04/2018    CREATININE 1.6 (H) 11/04/2018    CALCIUM 9.3 11/04/2018    ANIONGAP 12 11/04/2018    ESTGFRAFRICA 51 (A) 11/04/2018    EGFRNONAA 45 (A) 11/04/2018     Lab Results   Component Value Date    WBC 5.08 11/04/2018    HGB 11.4 (L) 11/04/2018    HCT 36.5 (L) 11/04/2018    MCV 92 11/04/2018     11/04/2018       Significant Imaging: reviewed

## 2018-11-05 ENCOUNTER — ANESTHESIA EVENT (OUTPATIENT)
Dept: SURGERY | Facility: HOSPITAL | Age: 63
DRG: 475 | End: 2018-11-05
Payer: MEDICARE

## 2018-11-05 ENCOUNTER — ANESTHESIA (OUTPATIENT)
Dept: SURGERY | Facility: HOSPITAL | Age: 63
DRG: 475 | End: 2018-11-05
Payer: MEDICARE

## 2018-11-05 LAB
ANION GAP SERPL CALC-SCNC: 12 MMOL/L
BACTERIA BLD CULT: NORMAL
BACTERIA BLD CULT: NORMAL
BACTERIA SPEC AEROBE CULT: NORMAL
BACTERIA SPEC AEROBE CULT: NORMAL
BASOPHILS # BLD AUTO: 0.01 K/UL
BASOPHILS NFR BLD: 0.2 %
BUN SERPL-MCNC: 19 MG/DL
CALCIUM SERPL-MCNC: 9.4 MG/DL
CHLORIDE SERPL-SCNC: 103 MMOL/L
CO2 SERPL-SCNC: 22 MMOL/L
CREAT SERPL-MCNC: 1.6 MG/DL
DIFFERENTIAL METHOD: ABNORMAL
EOSINOPHIL # BLD AUTO: 0.1 K/UL
EOSINOPHIL NFR BLD: 2.1 %
ERYTHROCYTE [DISTWIDTH] IN BLOOD BY AUTOMATED COUNT: 16.4 %
EST. GFR  (AFRICAN AMERICAN): 51 ML/MIN/1.73 M^2
EST. GFR  (NON AFRICAN AMERICAN): 45 ML/MIN/1.73 M^2
GLUCOSE SERPL-MCNC: 211 MG/DL
HCT VFR BLD AUTO: 35.3 %
HGB BLD-MCNC: 11.1 G/DL
LYMPHOCYTES # BLD AUTO: 1.6 K/UL
LYMPHOCYTES NFR BLD: 30.6 %
MCH RBC QN AUTO: 28.6 PG
MCHC RBC AUTO-ENTMCNC: 31.4 G/DL
MCV RBC AUTO: 91 FL
MONOCYTES # BLD AUTO: 0.6 K/UL
MONOCYTES NFR BLD: 11.2 %
NEUTROPHILS # BLD AUTO: 2.9 K/UL
NEUTROPHILS NFR BLD: 55.9 %
PLATELET # BLD AUTO: 215 K/UL
PMV BLD AUTO: 10 FL
POCT GLUCOSE: 146 MG/DL (ref 70–110)
POCT GLUCOSE: 169 MG/DL (ref 70–110)
POCT GLUCOSE: 193 MG/DL (ref 70–110)
POCT GLUCOSE: 85 MG/DL (ref 70–110)
POTASSIUM SERPL-SCNC: 4 MMOL/L
RBC # BLD AUTO: 3.88 M/UL
SODIUM SERPL-SCNC: 137 MMOL/L
TACROLIMUS BLD-MCNC: 5.6 NG/ML
WBC # BLD AUTO: 5.26 K/UL

## 2018-11-05 PROCEDURE — 99233 SBSQ HOSP IP/OBS HIGH 50: CPT | Mod: ,,, | Performed by: INTERNAL MEDICINE

## 2018-11-05 PROCEDURE — 63600175 PHARM REV CODE 636 W HCPCS: Mod: HCNC | Performed by: NURSE PRACTITIONER

## 2018-11-05 PROCEDURE — 63600175 PHARM REV CODE 636 W HCPCS: Mod: HCNC | Performed by: NURSE ANESTHETIST, CERTIFIED REGISTERED

## 2018-11-05 PROCEDURE — 25000003 PHARM REV CODE 250: Performed by: INTERNAL MEDICINE

## 2018-11-05 PROCEDURE — 85025 COMPLETE CBC W/AUTO DIFF WBC: CPT | Mod: HCNC

## 2018-11-05 PROCEDURE — 0JDR0ZZ EXTRACTION OF LEFT FOOT SUBCUTANEOUS TISSUE AND FASCIA, OPEN APPROACH: ICD-10-PCS | Performed by: PODIATRIST

## 2018-11-05 PROCEDURE — 27201423 OPTIME MED/SURG SUP & DEVICES STERILE SUPPLY: Mod: HCNC | Performed by: PODIATRIST

## 2018-11-05 PROCEDURE — 25000003 PHARM REV CODE 250: Mod: HCNC | Performed by: NURSE ANESTHETIST, CERTIFIED REGISTERED

## 2018-11-05 PROCEDURE — 13160 SEC CLSR SURG WND/DEHSN XTN: CPT | Mod: 58,HCNC,, | Performed by: PODIATRIST

## 2018-11-05 PROCEDURE — 25000003 PHARM REV CODE 250: Performed by: NURSE PRACTITIONER

## 2018-11-05 PROCEDURE — 28122 PARTIAL REMOVAL OF FOOT BONE: CPT | Mod: 58,TA,HCNC, | Performed by: PODIATRIST

## 2018-11-05 PROCEDURE — 63600175 PHARM REV CODE 636 W HCPCS: Performed by: NURSE PRACTITIONER

## 2018-11-05 PROCEDURE — 25000003 PHARM REV CODE 250: Performed by: ANESTHESIOLOGY

## 2018-11-05 PROCEDURE — 36000706: Mod: HCNC | Performed by: PODIATRIST

## 2018-11-05 PROCEDURE — 0QBP0ZZ EXCISION OF LEFT METATARSAL, OPEN APPROACH: ICD-10-PCS | Performed by: PODIATRIST

## 2018-11-05 PROCEDURE — 88311 DECALCIFY TISSUE: CPT | Mod: HCNC | Performed by: PATHOLOGY

## 2018-11-05 PROCEDURE — 94640 AIRWAY INHALATION TREATMENT: CPT

## 2018-11-05 PROCEDURE — 37000009 HC ANESTHESIA EA ADD 15 MINS: Mod: HCNC | Performed by: PODIATRIST

## 2018-11-05 PROCEDURE — 25000003 PHARM REV CODE 250: Mod: HCNC | Performed by: NURSE PRACTITIONER

## 2018-11-05 PROCEDURE — 21400001 HC TELEMETRY ROOM: Mod: HCNC

## 2018-11-05 PROCEDURE — 80197 ASSAY OF TACROLIMUS: CPT | Mod: HCNC

## 2018-11-05 PROCEDURE — 63600175 PHARM REV CODE 636 W HCPCS: Performed by: INTERNAL MEDICINE

## 2018-11-05 PROCEDURE — A4216 STERILE WATER/SALINE, 10 ML: HCPCS | Mod: HCNC | Performed by: ANESTHESIOLOGY

## 2018-11-05 PROCEDURE — 25000003 PHARM REV CODE 250: Performed by: PODIATRIST

## 2018-11-05 PROCEDURE — 37000008 HC ANESTHESIA 1ST 15 MINUTES: Mod: HCNC | Performed by: PODIATRIST

## 2018-11-05 PROCEDURE — 36415 COLL VENOUS BLD VENIPUNCTURE: CPT | Mod: HCNC

## 2018-11-05 PROCEDURE — 80048 BASIC METABOLIC PNL TOTAL CA: CPT | Mod: HCNC

## 2018-11-05 PROCEDURE — 88311 DECALCIFY TISSUE: CPT | Mod: 26,HCNC,, | Performed by: PATHOLOGY

## 2018-11-05 PROCEDURE — 63600175 PHARM REV CODE 636 W HCPCS: Performed by: ANESTHESIOLOGY

## 2018-11-05 PROCEDURE — 71000033 HC RECOVERY, INTIAL HOUR: Mod: HCNC | Performed by: PODIATRIST

## 2018-11-05 PROCEDURE — 25000242 PHARM REV CODE 250 ALT 637 W/ HCPCS: Performed by: PODIATRIST

## 2018-11-05 PROCEDURE — 36000707: Mod: HCNC | Performed by: PODIATRIST

## 2018-11-05 PROCEDURE — 88305 TISSUE EXAM BY PATHOLOGIST: CPT | Mod: 26,HCNC,, | Performed by: PATHOLOGY

## 2018-11-05 PROCEDURE — S0020 INJECTION, BUPIVICAINE HYDRO: HCPCS | Performed by: PODIATRIST

## 2018-11-05 PROCEDURE — 88305 TISSUE EXAM BY PATHOLOGIST: CPT | Mod: HCNC | Performed by: PATHOLOGY

## 2018-11-05 RX ORDER — BUPIVACAINE HYDROCHLORIDE 5 MG/ML
INJECTION, SOLUTION EPIDURAL; INTRACAUDAL
Status: DISCONTINUED | OUTPATIENT
Start: 2018-11-05 | End: 2018-11-05 | Stop reason: HOSPADM

## 2018-11-05 RX ORDER — FENTANYL CITRATE 50 UG/ML
25 INJECTION, SOLUTION INTRAMUSCULAR; INTRAVENOUS EVERY 5 MIN PRN
Status: DISCONTINUED | OUTPATIENT
Start: 2018-11-05 | End: 2018-11-06

## 2018-11-05 RX ORDER — OXYCODONE HYDROCHLORIDE 5 MG/1
5 TABLET ORAL
Status: DISCONTINUED | OUTPATIENT
Start: 2018-11-05 | End: 2018-11-05 | Stop reason: SDUPTHER

## 2018-11-05 RX ORDER — SULFAMETHOXAZOLE AND TRIMETHOPRIM 800; 160 MG/1; MG/1
1 TABLET ORAL 2 TIMES DAILY
Status: DISCONTINUED | OUTPATIENT
Start: 2018-11-05 | End: 2018-11-07

## 2018-11-05 RX ORDER — MEPERIDINE HYDROCHLORIDE 50 MG/ML
12.5 INJECTION INTRAMUSCULAR; INTRAVENOUS; SUBCUTANEOUS ONCE AS NEEDED
Status: DISCONTINUED | OUTPATIENT
Start: 2018-11-05 | End: 2018-11-05 | Stop reason: SDUPTHER

## 2018-11-05 RX ORDER — ONDANSETRON 2 MG/ML
INJECTION INTRAMUSCULAR; INTRAVENOUS
Status: DISCONTINUED | OUTPATIENT
Start: 2018-11-05 | End: 2018-11-05

## 2018-11-05 RX ORDER — SODIUM CHLORIDE 0.9 % (FLUSH) 0.9 %
3 SYRINGE (ML) INJECTION
Status: DISCONTINUED | OUTPATIENT
Start: 2018-11-05 | End: 2018-11-13 | Stop reason: HOSPADM

## 2018-11-05 RX ORDER — PROPOFOL 10 MG/ML
VIAL (ML) INTRAVENOUS
Status: DISCONTINUED | OUTPATIENT
Start: 2018-11-05 | End: 2018-11-05

## 2018-11-05 RX ORDER — MORPHINE SULFATE 4 MG/ML
2 INJECTION, SOLUTION INTRAMUSCULAR; INTRAVENOUS EVERY 5 MIN PRN
Status: DISCONTINUED | OUTPATIENT
Start: 2018-11-05 | End: 2018-11-05 | Stop reason: SDUPTHER

## 2018-11-05 RX ORDER — MORPHINE SULFATE 2 MG/ML
2 INJECTION, SOLUTION INTRAMUSCULAR; INTRAVENOUS ONCE
Status: COMPLETED | OUTPATIENT
Start: 2018-11-05 | End: 2018-11-05

## 2018-11-05 RX ORDER — MIDAZOLAM HYDROCHLORIDE 1 MG/ML
INJECTION, SOLUTION INTRAMUSCULAR; INTRAVENOUS
Status: DISCONTINUED | OUTPATIENT
Start: 2018-11-05 | End: 2018-11-05

## 2018-11-05 RX ORDER — ASPIRIN 81 MG/1
81 TABLET ORAL DAILY
Status: DISCONTINUED | OUTPATIENT
Start: 2018-11-06 | End: 2018-11-13 | Stop reason: HOSPADM

## 2018-11-05 RX ORDER — METOCLOPRAMIDE HYDROCHLORIDE 5 MG/ML
10 INJECTION INTRAMUSCULAR; INTRAVENOUS EVERY 10 MIN PRN
Status: DISCONTINUED | OUTPATIENT
Start: 2018-11-05 | End: 2018-11-05 | Stop reason: SDUPTHER

## 2018-11-05 RX ORDER — SODIUM CHLORIDE 0.9 % (FLUSH) 0.9 %
3 SYRINGE (ML) INJECTION EVERY 8 HOURS
Status: DISCONTINUED | OUTPATIENT
Start: 2018-11-05 | End: 2018-11-05 | Stop reason: SDUPTHER

## 2018-11-05 RX ORDER — FENTANYL CITRATE 50 UG/ML
INJECTION, SOLUTION INTRAMUSCULAR; INTRAVENOUS
Status: DISCONTINUED | OUTPATIENT
Start: 2018-11-05 | End: 2018-11-05

## 2018-11-05 RX ORDER — BENZONATATE 100 MG/1
100 CAPSULE ORAL 3 TIMES DAILY PRN
Status: DISCONTINUED | OUTPATIENT
Start: 2018-11-05 | End: 2018-11-13 | Stop reason: HOSPADM

## 2018-11-05 RX ORDER — OXYCODONE HYDROCHLORIDE 5 MG/1
5 TABLET ORAL
Status: DISCONTINUED | OUTPATIENT
Start: 2018-11-05 | End: 2018-11-06

## 2018-11-05 RX ORDER — PROPOFOL 10 MG/ML
VIAL (ML) INTRAVENOUS CONTINUOUS PRN
Status: DISCONTINUED | OUTPATIENT
Start: 2018-11-05 | End: 2018-11-05

## 2018-11-05 RX ORDER — MORPHINE SULFATE 4 MG/ML
2 INJECTION, SOLUTION INTRAMUSCULAR; INTRAVENOUS EVERY 5 MIN PRN
Status: DISCONTINUED | OUTPATIENT
Start: 2018-11-05 | End: 2018-11-06

## 2018-11-05 RX ORDER — GLYCOPYRROLATE 0.2 MG/ML
INJECTION INTRAMUSCULAR; INTRAVENOUS
Status: DISCONTINUED | OUTPATIENT
Start: 2018-11-05 | End: 2018-11-05

## 2018-11-05 RX ORDER — LISINOPRIL 10 MG/1
10 TABLET ORAL DAILY
Status: DISCONTINUED | OUTPATIENT
Start: 2018-11-05 | End: 2018-11-07

## 2018-11-05 RX ORDER — METOCLOPRAMIDE HYDROCHLORIDE 5 MG/ML
10 INJECTION INTRAMUSCULAR; INTRAVENOUS EVERY 10 MIN PRN
Status: DISCONTINUED | OUTPATIENT
Start: 2018-11-05 | End: 2018-11-13 | Stop reason: HOSPADM

## 2018-11-05 RX ORDER — SODIUM CHLORIDE 450 MG/100ML
INJECTION, SOLUTION INTRAVENOUS CONTINUOUS
Status: ACTIVE | OUTPATIENT
Start: 2018-11-06 | End: 2018-11-06

## 2018-11-05 RX ORDER — LIDOCAINE HYDROCHLORIDE 10 MG/ML
INJECTION, SOLUTION EPIDURAL; INFILTRATION; INTRACAUDAL; PERINEURAL
Status: DISCONTINUED | OUTPATIENT
Start: 2018-11-05 | End: 2018-11-05 | Stop reason: HOSPADM

## 2018-11-05 RX ORDER — LIDOCAINE HCL/PF 100 MG/5ML
SYRINGE (ML) INTRAVENOUS
Status: DISCONTINUED | OUTPATIENT
Start: 2018-11-05 | End: 2018-11-05

## 2018-11-05 RX ORDER — MEPERIDINE HYDROCHLORIDE 50 MG/ML
12.5 INJECTION INTRAMUSCULAR; INTRAVENOUS; SUBCUTANEOUS ONCE AS NEEDED
Status: ACTIVE | OUTPATIENT
Start: 2018-11-05 | End: 2018-11-05

## 2018-11-05 RX ORDER — MEPERIDINE HYDROCHLORIDE 50 MG/ML
12.5 INJECTION INTRAMUSCULAR; INTRAVENOUS; SUBCUTANEOUS ONCE
Status: ACTIVE | OUTPATIENT
Start: 2018-11-05 | End: 2018-11-06

## 2018-11-05 RX ORDER — SODIUM CHLORIDE, SODIUM LACTATE, POTASSIUM CHLORIDE, CALCIUM CHLORIDE 600; 310; 30; 20 MG/100ML; MG/100ML; MG/100ML; MG/100ML
INJECTION, SOLUTION INTRAVENOUS CONTINUOUS PRN
Status: DISCONTINUED | OUTPATIENT
Start: 2018-11-05 | End: 2018-11-05

## 2018-11-05 RX ORDER — SODIUM CHLORIDE 0.9 % (FLUSH) 0.9 %
3 SYRINGE (ML) INJECTION EVERY 8 HOURS
Status: DISCONTINUED | OUTPATIENT
Start: 2018-11-05 | End: 2018-11-07

## 2018-11-05 RX ADMIN — SODIUM CHLORIDE, SODIUM LACTATE, POTASSIUM CHLORIDE, AND CALCIUM CHLORIDE: .6; .31; .03; .02 INJECTION, SOLUTION INTRAVENOUS at 01:11

## 2018-11-05 RX ADMIN — GABAPENTIN 300 MG: 300 CAPSULE ORAL at 08:11

## 2018-11-05 RX ADMIN — PREDNISONE 5 MG: 5 TABLET ORAL at 09:11

## 2018-11-05 RX ADMIN — TACROLIMUS 1.5 MG: 0.5 CAPSULE ORAL at 05:11

## 2018-11-05 RX ADMIN — LIDOCAINE HYDROCHLORIDE 80 MG: 20 INJECTION, SOLUTION INTRAVENOUS at 01:11

## 2018-11-05 RX ADMIN — BUDESONIDE 0.5 MG: 0.5 SUSPENSION RESPIRATORY (INHALATION) at 07:11

## 2018-11-05 RX ADMIN — FENTANYL CITRATE 25 MCG: 50 INJECTION, SOLUTION INTRAMUSCULAR; INTRAVENOUS at 02:11

## 2018-11-05 RX ADMIN — INSULIN ASPART 2 UNITS: 100 INJECTION, SOLUTION INTRAVENOUS; SUBCUTANEOUS at 05:11

## 2018-11-05 RX ADMIN — PROPOFOL 50 MCG/KG/MIN: 10 INJECTION, EMULSION INTRAVENOUS at 01:11

## 2018-11-05 RX ADMIN — LINEZOLID 600 MG: 600 TABLET, FILM COATED ORAL at 09:11

## 2018-11-05 RX ADMIN — GABAPENTIN 300 MG: 300 CAPSULE ORAL at 09:11

## 2018-11-05 RX ADMIN — METOPROLOL TARTRATE 25 MG: 25 TABLET ORAL at 08:11

## 2018-11-05 RX ADMIN — LISINOPRIL 10 MG: 10 TABLET ORAL at 10:11

## 2018-11-05 RX ADMIN — SODIUM BICARBONATE 650 MG TABLET 2600 MG: at 04:11

## 2018-11-05 RX ADMIN — PROPOFOL 50 MG: 10 INJECTION, EMULSION INTRAVENOUS at 01:11

## 2018-11-05 RX ADMIN — HYDROCODONE BITARTRATE AND ACETAMINOPHEN 1 TABLET: 10; 325 TABLET ORAL at 08:11

## 2018-11-05 RX ADMIN — INSULIN ASPART 2 UNITS: 100 INJECTION, SOLUTION INTRAVENOUS; SUBCUTANEOUS at 11:11

## 2018-11-05 RX ADMIN — HYDROCODONE BITARTRATE AND ACETAMINOPHEN 1 TABLET: 10; 325 TABLET ORAL at 03:11

## 2018-11-05 RX ADMIN — SULFAMETHOXAZOLE AND TRIMETHOPRIM 1 TABLET: 800; 160 TABLET ORAL at 09:11

## 2018-11-05 RX ADMIN — SODIUM BICARBONATE 650 MG TABLET 2600 MG: at 08:11

## 2018-11-05 RX ADMIN — LINEZOLID 600 MG: 600 TABLET, FILM COATED ORAL at 08:11

## 2018-11-05 RX ADMIN — HYDRALAZINE HYDROCHLORIDE AND ISOSORBIDE DINITRATE 1 TABLET: 37.5; 2 TABLET, FILM COATED ORAL at 09:11

## 2018-11-05 RX ADMIN — PROMETHAZINE HYDROCHLORIDE 6.25 MG: 25 INJECTION INTRAMUSCULAR; INTRAVENOUS at 08:11

## 2018-11-05 RX ADMIN — HYDRALAZINE HYDROCHLORIDE AND ISOSORBIDE DINITRATE 1 TABLET: 37.5; 2 TABLET, FILM COATED ORAL at 04:11

## 2018-11-05 RX ADMIN — METOPROLOL TARTRATE 25 MG: 25 TABLET ORAL at 09:11

## 2018-11-05 RX ADMIN — HYDRALAZINE HYDROCHLORIDE AND ISOSORBIDE DINITRATE 1 TABLET: 37.5; 2 TABLET, FILM COATED ORAL at 08:11

## 2018-11-05 RX ADMIN — TACROLIMUS 1.5 MG: 0.5 CAPSULE ORAL at 09:11

## 2018-11-05 RX ADMIN — FENTANYL CITRATE 25 MCG: 50 INJECTION, SOLUTION INTRAMUSCULAR; INTRAVENOUS at 01:11

## 2018-11-05 RX ADMIN — SULFAMETHOXAZOLE AND TRIMETHOPRIM 1 TABLET: 800; 160 TABLET ORAL at 08:11

## 2018-11-05 RX ADMIN — PROMETHAZINE HYDROCHLORIDE 6.25 MG: 25 INJECTION INTRAMUSCULAR; INTRAVENOUS at 05:11

## 2018-11-05 RX ADMIN — MORPHINE SULFATE 2 MG: 2 INJECTION, SOLUTION INTRAMUSCULAR; INTRAVENOUS at 01:11

## 2018-11-05 RX ADMIN — ARFORMOTEROL TARTRATE 15 MCG: 15 SOLUTION RESPIRATORY (INHALATION) at 07:11

## 2018-11-05 RX ADMIN — CEFTRIAXONE 1 G: 1 INJECTION, SOLUTION INTRAVENOUS at 09:11

## 2018-11-05 RX ADMIN — MIDAZOLAM 2 MG: 1 INJECTION INTRAMUSCULAR; INTRAVENOUS at 01:11

## 2018-11-05 RX ADMIN — HUMAN INSULIN 20 UNITS: 100 INJECTION, SUSPENSION SUBCUTANEOUS at 04:11

## 2018-11-05 RX ADMIN — AMLODIPINE BESYLATE 10 MG: 10 TABLET ORAL at 09:11

## 2018-11-05 RX ADMIN — ONDANSETRON 4 MG: 2 INJECTION, SOLUTION INTRAMUSCULAR; INTRAVENOUS at 01:11

## 2018-11-05 RX ADMIN — GABAPENTIN 300 MG: 300 CAPSULE ORAL at 04:11

## 2018-11-05 RX ADMIN — HYDROCODONE BITARTRATE AND ACETAMINOPHEN 1 TABLET: 10; 325 TABLET ORAL at 05:11

## 2018-11-05 RX ADMIN — Medication 3 ML: at 10:11

## 2018-11-05 RX ADMIN — ROBINUL 0.2 MG: 0.2 INJECTION INTRAMUSCULAR; INTRAVENOUS at 01:11

## 2018-11-05 RX ADMIN — HYDROCODONE BITARTRATE AND ACETAMINOPHEN 1 TABLET: 10; 325 TABLET ORAL at 10:11

## 2018-11-05 NOTE — SUBJECTIVE & OBJECTIVE
Interval History: 63 year old man with diabetic foot.  All previous cultures reviewed   10/31- GNR  09/21- MSSA  He does not want LTAC   11/2- latest wound cultures -enterococcus/enterobacter   He is afebrile   11/3-wound cultures -VRE/ enterobacter   He says he does not feel well     11/4- more wound cultures= stenotrophomonas  VRE and enterobacter   He is afebrile   Review of Systems   Constitutional: Negative for chills, diaphoresis, fatigue and fever.   HENT: Negative for hearing loss, mouth sores, sore throat, tinnitus and trouble swallowing.    Eyes: Negative for pain, discharge and redness.   Respiratory: Negative for apnea, cough, choking, chest tightness, shortness of breath, wheezing and stridor.    Cardiovascular: Negative for chest pain, palpitations and leg swelling.   Gastrointestinal: Negative for abdominal distention, abdominal pain, blood in stool, constipation, diarrhea, nausea, rectal pain and vomiting.   Endocrine: Negative for cold intolerance, heat intolerance, polydipsia, polyphagia and polyuria.   Genitourinary: Negative for difficulty urinating, dysuria, flank pain, frequency, hematuria and urgency.   Musculoskeletal: Negative for arthralgias, back pain, gait problem, joint swelling, neck pain and neck stiffness.   Skin: Positive for color change and wound. Negative for rash.        Left foot with worsening erythema.    Allergic/Immunologic: Negative for food allergies.   Neurological: Negative for dizziness, tremors, seizures, syncope, speech difficulty, light-headedness and headaches.   Hematological: Negative for adenopathy. Does not bruise/bleed easily.   Psychiatric/Behavioral: Negative for agitation and confusion. The patient is not nervous/anxious.    All other systems reviewed and are negative.    Objective:     Vital Signs (Most Recent):  Temp: 97.8 °F (36.6 °C) (11/05/18 0447)  Pulse: 66 (11/05/18 0521)  Resp: 18 (11/05/18 0447)  BP: 134/78 (11/05/18 0447)  SpO2: (!) 93 % (11/05/18  0447) Vital Signs (24h Range):  Temp:  [97.7 °F (36.5 °C)-98.6 °F (37 °C)] 97.8 °F (36.6 °C)  Pulse:  [57-70] 66  Resp:  [14-18] 18  SpO2:  [93 %-97 %] 93 %  BP: (107-184)/(55-79) 134/78     Weight: 99.8 kg (220 lb 0.3 oz)  Body mass index is 30.69 kg/m².    Estimated Creatinine Clearance: 55.4 mL/min (A) (based on SCr of 1.6 mg/dL (H)).    Physical Exam   Constitutional: He is oriented to person, place, and time. He appears well-developed and well-nourished. No distress.   HENT:   Head: Normocephalic and atraumatic.   Mouth/Throat: Oropharynx is clear and moist.   Eyes: Conjunctivae and EOM are normal. Pupils are equal, round, and reactive to light.   Neck: Normal range of motion. Neck supple. No JVD present.   Cardiovascular: Normal rate, regular rhythm, normal heart sounds and intact distal pulses. Exam reveals no gallop and no friction rub.   No murmur heard.  Pulmonary/Chest: Effort normal and breath sounds normal. No stridor. No respiratory distress. He has no wheezes. He has no rales. He exhibits no tenderness.   Abdominal: Soft. Bowel sounds are normal. He exhibits no distension and no mass. There is no tenderness. There is no rebound and no guarding.   Musculoskeletal: Normal range of motion. He exhibits no edema or tenderness.   Dressing over foot noted   Neurological: He is alert and oriented to person, place, and time. No cranial nerve deficit.   Skin: Skin is warm and dry. No rash noted. He is not diaphoretic. There is erythema.   -   Psychiatric: He has a normal mood and affect. His speech is normal. Judgment and thought content normal. He is withdrawn. Cognition and memory are normal. He is inattentive.   Nursing note and vitals reviewed.      Significant Labs:   Blood Culture:   Recent Labs   Lab 09/18/18  1812 09/21/18  0907 09/21/18  1516 10/31/18  1054 10/31/18  1055   LABBLOO Gram stain aer bottle: Gram positive cocci in clusters resembling Staph   Gram stain susan bottle: Gram positive cocci in  clusters resembling Staph   Results called to and read back by:NOA Eckert RN 09/19/2018  15:31  STAPHYLOCOCCUS AUREUS  ID consult required at [facility].Gerhard,Carla and Leigh Ann knapp.  For susceptibility see order # [id number]   No growth after 5 days. No growth after 5 days. No Growth to date  No Growth to date  No Growth to date  No Growth to date  No Growth to date No Growth to date  No Growth to date  No Growth to date  No Growth to date  No Growth to date     BMP:   Recent Labs   Lab 11/05/18  0507   *      K 4.0      CO2 22*   BUN 19   CREATININE 1.6*   CALCIUM 9.4     CBC:   Recent Labs   Lab 11/04/18  0430 11/05/18  0508   WBC 5.08 5.26   HGB 11.4* 11.1*   HCT 36.5* 35.3*    215     CMP:   Recent Labs   Lab 11/04/18  0430 11/05/18  0507   * 137   K 4.0 4.0    103   CO2 20* 22*   * 211*   BUN 19 19   CREATININE 1.6* 1.6*   CALCIUM 9.3 9.4   ANIONGAP 12 12   EGFRNONAA 45* 45*     Wound Culture:   Recent Labs   Lab 07/09/18  0856 09/21/18  1235 10/18/18  1328 10/31/18  0911 10/31/18  1640   LABAERO CITROBACTER FREUNDII  Rare    KLEBSIELLA OXYTOCA  Rare   STAPHYLOCOCCUS AUREUS  Few  Skin skylar also present   Skin skylar,  no predominant organism ENTEROBACTER CLOACAE  Many    STENOTROPHOMONAS (X.) MALTOPHILIA  Moderate  Susceptibility pending   ENTEROCOCCUS FAECIUM VRE  Rare    ENTEROBACTER CLOACAE  Rare         Significant Imaging: I have reviewed all pertinent imaging results/findings within the past 24 hours.

## 2018-11-05 NOTE — PROGRESS NOTES
[facility] -   Nephrology  Progress Note    Patient Name: Lavelle Ladd  MRN: [id number]  Admission Date: 10/31/2018  Hospital Length of Stay: 5 days  Attending Provider: Ruma Rand MD   Primary Care Physician: Rishabh Esteban MD  Principal Problem:S/P transmetatarsal amputation of foot, left    Subjective:     HPI: Lavelle Ladd is a pleasant 63 y old  man  , s/p   donor ( brain death ) kidney transplant on 16.  He has CKD stage 3 - GFR 30-59 and his baseline creatinine is between 1.5-1.8. He takes  prednisone 5 mg daily and tacrolimus 1.5 mg twice a day , for maintenance immunosuppression.  He he is also on CellCept 500 mg twice a day, will hold this medication due to cellulitis of his left foot, he is status post left transmetatarsal amputation on 2018, patient was seen for follow-up appointment in the clinic yesterday and notice that his foot was infected, patient was admitted to the hospital for further management,      Interval History: Pt was seen and examined. Stable last pm, no new issues, no c/o's this am, no SOB, no fever.    Review of patient's allergies indicates:  No Known Allergies  Current Facility-Administered Medications   Medication Frequency    amLODIPine tablet 10 mg Daily    arformoterol nebulizer solution 15 mcg Q12H    aspirin EC tablet 81 mg Daily    budesonide nebulizer solution 0.5 mg Q12H    cefTRIAXone (ROCEPHIN) 1 g in dextrose 5 % 50 mL IVPB Q24H    dextrose 50% injection 12.5 g PRN    dextrose 50% injection 25 g PRN    gabapentin capsule 300 mg TID    glucagon (human recombinant) injection 1 mg PRN    glucose chewable tablet 16 g PRN    glucose chewable tablet 24 g PRN    HYDROcodone-acetaminophen  mg per tablet 1 tablet Q4H PRN    insulin aspart U-100 pen 1-10 Units QID (AC + HS) PRN    insulin NPH injection 20 Units BID AC    isosorbide-hydrALAZINE 20-37.5 mg per tablet 1 tablet TID    linezolid tablet 600  mg Q12H    metoprolol tartrate (LOPRESSOR) tablet 25 mg BID    nozaseptin (NOZIN) nasal  BID    ondansetron disintegrating tablet 8 mg Q8H PRN    ondansetron injection 4 mg Q8H PRN    predniSONE tablet 5 mg Daily    sodium bicarbonate tablet 2,600 mg BID    sodium chloride 0.9% flush 3 mL PRN    sodium chloride 0.9% flush 3 mL PRN    sodium chloride 0.9% flush 5 mL PRN    sulfamethoxazole-trimethoprim 800-160mg per tablet 1 tablet BID    tacrolimus capsule 1.5 mg BID       Objective:     Vital Signs (Most Recent):  Temp: 97.7 °F (36.5 °C) (11/05/18 0917)  Pulse: 65 (11/05/18 0917)  Resp: 20 (11/05/18 0737)  BP: (!) 194/83 (11/05/18 0917)  SpO2: 95 % (11/05/18 0917)  O2 Device (Oxygen Therapy): room air (11/05/18 0737) Vital Signs (24h Range):  Temp:  [97.7 °F (36.5 °C)-98.1 °F (36.7 °C)] 97.7 °F (36.5 °C)  Pulse:  [60-70] 65  Resp:  [16-20] 20  SpO2:  [93 %-98 %] 95 %  BP: (107-194)/(55-83) 194/83     Weight: 99.8 kg (220 lb 0.3 oz) (10/31/18 1049)  Body mass index is 30.69 kg/m².  Body surface area is 2.24 meters squared.    I/O last 3 completed shifts:  In: 2210 [P.O.:2160; IV Piggyback:50]  Out: 2700 [Urine:2700]    Physical Exam   Constitutional: He is oriented to person, place, and time. He appears well-developed and well-nourished.   HENT:   Head: Normocephalic and atraumatic.   Neck: No JVD present.   Cardiovascular: Normal rate, regular rhythm and normal heart sounds.   Pulmonary/Chest: No respiratory distress. He has no rales.   Abdominal: Soft. He exhibits no distension.   Musculoskeletal: He exhibits no edema.   Neurological: He is oriented to person, place, and time.   Skin: Skin is dry.   Psychiatric: He has a normal mood and affect. His behavior is normal.   Nursing note and vitals reviewed.      Significant Labs: reviewed  BMP  Lab Results   Component Value Date     11/05/2018    K 4.0 11/05/2018     11/05/2018    CO2 22 (L) 11/05/2018    BUN 19 11/05/2018    CREATININE  1.6 (H) 11/05/2018    CALCIUM 9.4 11/05/2018    ANIONGAP 12 11/05/2018    ESTGFRAFRICA 51 (A) 11/05/2018    EGFRNONAA 45 (A) 11/05/2018     Lab Results   Component Value Date    WBC 5.26 11/05/2018    HGB 11.1 (L) 11/05/2018    HCT 35.3 (L) 11/05/2018    MCV 91 11/05/2018     11/05/2018         Assessment/Plan:         64 y/o male with a h/o of KTx has wound infection after amputation:                  Kidney transplant recipient     CKD stage 3. No change in s Cr, stable.  Stable renal function, no change, no worsening  K normal  Metabolic acidosis, mild, stable, improved  Hyponatremia, mild, improved, Na normal now     H/o of cadaveric kidney transplant in May 2016  Doing well, stable hemodynamically  On immunosuppressive therapy  Last prograf level was within the therapeutic range  Will repeat prograf level in am  No change in dose of prograf, continue same dose for now  Cellcept is on hold, keep on hold, due to current and active infection     HTN : BP was controlled yesterday  Elevated today  Meds reviewed  Will add lisinopril 10 mg po qd     H/o of DM  Diabetic nephropathy               * Abscess of left foot     Left foot wound infection post mid foot amputation  Wound cx growing Enterococcus and Enterobacter  Abx reviewed   Continue zosyn and zyvox   Pt going for further surgery and wound closure today            Plans and recommendations:  As discussed above  Will f/u closely.               Maureen Cherry MD  Nephrology  [facility] -

## 2018-11-05 NOTE — ASSESSMENT & PLAN NOTE
- A1c 8.0 in September  -Glucose slightly uncontrolled  - Accu checks ACHS with SSI PRN  - add NPH at lower doses  - Monitor  -SSI dose increased to moderate   -Continue to monitor adjust insulin accordingly

## 2018-11-05 NOTE — PLAN OF CARE
Problem: Patient Care Overview  Goal: Plan of Care Review  Outcome: Ongoing (interventions implemented as appropriate)  Pt had no adverse events during shift. Pt free of falls. Call light in reach. Side rails x 2. Pain controlled w/ PRN PO meds and 1x dose of IV meds (see note). NPO status maintained since midnight. LLE elevated during shift. Dressing maintained CDI. NSR on tele monitor. Blood glucose monitored, supplemental insulin given as needed. Contact precautions maintained. VSS. Chart reviewed, will continue to monitor.

## 2018-11-05 NOTE — ASSESSMENT & PLAN NOTE
11/1-will follow vascular surgery on discharge      11/2- will continue vascular surgery follow up     11/4- will continue close follow up by vascular surgery

## 2018-11-05 NOTE — ASSESSMENT & PLAN NOTE
-Creatinine 1.8 today. Slight increase. Closely monitor  - Daily BMP  - Monitor  -Creatinine 1.5 at baseline   -Creatinine stable  -Nephrology following   -Remains stable

## 2018-11-05 NOTE — PLAN OF CARE
Problem: Patient Care Overview  Goal: Plan of Care Review  Outcome: Ongoing (interventions implemented as appropriate)   11/05/18 1456   Coping/Psychosocial   Plan Of Care Reviewed With patient     Goal: Individualization & Mutuality  Patient anticipating wound closure. No current complaints at this time. Will continue to monitor. Chart check complete.    Problem: Diabetes, Type 2 (Adult)  Intervention: Support/Optimize Psychosocial Response to Condition   11/04/18 0913 11/05/18 0243 11/05/18 1456   Coping/Psychosocial Interventions   Supportive Measures --  --  active listening utilized;goal setting facilitated;counseling provided;decision-making supported   Environmental Support calm environment promoted --  --    Psychosocial Support   Family/Support System Care --  self-care encouraged --      Intervention: Optimize Glycemic Control   11/05/18 1456   Nutrition Interventions   Glycemic Management blood glucose monitoring;carbohydrate replacement provided;insulin infusion adjusted         Problem: Infection, Risk/Actual (Adult)  Intervention: Manage Suspected/Actual Infection   11/05/18 1456   Safety Interventions   Isolation Precautions environmental surveillance;contact precautions maintained     Intervention: Prevent Infection/Maximize Resistance   11/05/18 1456   Nutrition Interventions   Glycemic Management blood glucose monitoring;carbohydrate replacement provided;insulin infusion adjusted         Problem: Fall Risk (Adult)  Intervention: Monitor/Assist with Self Care   11/05/18 1456   Activity   Activity Assistance Provided assistance, 1 person   Functional Level Current   Ambulation 1 - assistive equipment   Transferring 1 - assistive equipment   Toileting 1 - assistive equipment   Bathing 1 - assistive equipment   Dressing 1 - assistive equipment   Eating 1 - assistive equipment   Communication 0 - understands/communicates without difficulty   Swallowing 0 - swallows foods/liquids without difficulty

## 2018-11-05 NOTE — ANESTHESIA PREPROCEDURE EVALUATION
11/05/2018  Lavelle Ladd is a 63 y.o., male.    Pre-op Assessment    I have reviewed the Patient Summary Reports.     I have reviewed the Nursing Notes.   I have reviewed the Medications.     Review of Systems  Anesthesia Hx:  No problems with previous Anesthesia  Denies Family Hx of Anesthesia complications.   Denies Personal Hx of Anesthesia complications.   Social:  Former Smoker    Cardiovascular:   Hypertension CAD   ECG has been reviewed. EF normal  Diastolic dysfunction   Pulmonary:   COPD, mild    Renal/:   Chronic Renal Disease S/p renal transplant 2 years ago   Hepatic/GI:   PUD, GERD, well controlled Liver Disease, Hepatitis, C    Musculoskeletal:   Arthritis     Neurological:   Neuromuscular Disease,    Endocrine:   Diabetes, type 2, using insulin           Anesthesia Plan  Type of Anesthesia, risks & benefits discussed:  Anesthesia Type:  MAC  Patient's Preference:   Intra-op Monitoring Plan:   Intra-op Monitoring Plan Comments:   Post Op Pain Control Plan:   Post Op Pain Control Plan Comments:   Induction:    Beta Blocker:         Informed Consent: Patient understands risks and agrees with Anesthesia plan.  Questions answered. Anesthesia consent signed with patient.  ASA Score: 3     Day of Surgery Review of History & Physical: I have interviewed and examined the patient. I have reviewed the patient's H&P dated:  There are no significant changes.

## 2018-11-05 NOTE — PROGRESS NOTES
Pt is s/p Left foot Secondary Wound Closure + Debridement of Bone/Transmetatarsal Amputation, DOS: 11/5/2018  I highly recommend transfer to LTAC or SNF for discharge.   Pt has history of NON COMPLIANCE and inability to remain NWB at home or comply with home care with dressing  He will need IV antibiotics and LTAC for at least 2 weeks to monitor wound for further necrosis/ and/or infection  He is unable to comply with NWB instructions at home. He has adamantly declined LTAC in the past, and then tried to get admitted after he was discharged  He has a high probability of wound failure requiring readmission to hospital with further amputation- Leg if he does not have close monitoring and follow up.  Nursing dressing orders:  1. Change surgical dressings every 3 days: adaptic, betadine along incision line, kerlix, cast padding, and application of posterior splint. If any signs of necrosis, contact MD.  2. Pt to remain NWB to operative extremity. Will benefit from physical therapy with gait training- NWB to operative extremity  3. Follow up in my outpatient clinic in 2 weeks after LTAC discharge or if patient still declines, then will f/u in my outpatient clinic in 3-5 days from discharge. Keep dressing clean, dry, intact. Do not remove. Do not get wet. Do not ambulate on operative extremity.     Report Electronically Signed By:     Karla Wheeler DPM   Podiatry  [facility]  11/5/2018

## 2018-11-05 NOTE — NURSING
Spoke to ANTONIO Dumont NP regarding pain management. She ordered 2mg Morphine for one time dose IV, continue Norco use.

## 2018-11-05 NOTE — ASSESSMENT & PLAN NOTE
10/31- will continue close monitoring , avoid nephrotoxic meds.    11/3- will continue close monitoring of serum creatinine , avoid nephrotoxic  meds    11/4- nephrology follow up , avoid nephrotoxic meds

## 2018-11-05 NOTE — SUBJECTIVE & OBJECTIVE
Interval History: Pt was seen and examined. Stable last pm, no new issues, no c/o's this am, no SOB, no fever.    Review of patient's allergies indicates:  No Known Allergies  Current Facility-Administered Medications   Medication Frequency    amLODIPine tablet 10 mg Daily    arformoterol nebulizer solution 15 mcg Q12H    aspirin EC tablet 81 mg Daily    budesonide nebulizer solution 0.5 mg Q12H    cefTRIAXone (ROCEPHIN) 1 g in dextrose 5 % 50 mL IVPB Q24H    dextrose 50% injection 12.5 g PRN    dextrose 50% injection 25 g PRN    gabapentin capsule 300 mg TID    glucagon (human recombinant) injection 1 mg PRN    glucose chewable tablet 16 g PRN    glucose chewable tablet 24 g PRN    HYDROcodone-acetaminophen  mg per tablet 1 tablet Q4H PRN    insulin aspart U-100 pen 1-10 Units QID (AC + HS) PRN    insulin NPH injection 20 Units BID AC    isosorbide-hydrALAZINE 20-37.5 mg per tablet 1 tablet TID    linezolid tablet 600 mg Q12H    metoprolol tartrate (LOPRESSOR) tablet 25 mg BID    nozaseptin (NOZIN) nasal  BID    ondansetron disintegrating tablet 8 mg Q8H PRN    ondansetron injection 4 mg Q8H PRN    predniSONE tablet 5 mg Daily    sodium bicarbonate tablet 2,600 mg BID    sodium chloride 0.9% flush 3 mL PRN    sodium chloride 0.9% flush 3 mL PRN    sodium chloride 0.9% flush 5 mL PRN    sulfamethoxazole-trimethoprim 800-160mg per tablet 1 tablet BID    tacrolimus capsule 1.5 mg BID       Objective:     Vital Signs (Most Recent):  Temp: 97.7 °F (36.5 °C) (11/05/18 0917)  Pulse: 65 (11/05/18 0917)  Resp: 20 (11/05/18 0737)  BP: (!) 194/83 (11/05/18 0917)  SpO2: 95 % (11/05/18 0917)  O2 Device (Oxygen Therapy): room air (11/05/18 0737) Vital Signs (24h Range):  Temp:  [97.7 °F (36.5 °C)-98.1 °F (36.7 °C)] 97.7 °F (36.5 °C)  Pulse:  [60-70] 65  Resp:  [16-20] 20  SpO2:  [93 %-98 %] 95 %  BP: (107-194)/(55-83) 194/83     Weight: 99.8 kg (220 lb 0.3 oz) (10/31/18 1049)  Body mass  index is 30.69 kg/m².  Body surface area is 2.24 meters squared.    I/O last 3 completed shifts:  In: 2210 [P.O.:2160; IV Piggyback:50]  Out: 2700 [Urine:2700]    Physical Exam   Constitutional: He is oriented to person, place, and time. He appears well-developed and well-nourished.   HENT:   Head: Normocephalic and atraumatic.   Neck: No JVD present.   Cardiovascular: Normal rate, regular rhythm and normal heart sounds.   Pulmonary/Chest: No respiratory distress. He has no rales.   Abdominal: Soft. He exhibits no distension.   Musculoskeletal: He exhibits no edema.   Neurological: He is oriented to person, place, and time.   Skin: Skin is dry.   Psychiatric: He has a normal mood and affect. His behavior is normal.   Nursing note and vitals reviewed.      Significant Labs: reviewed  BMP  Lab Results   Component Value Date     11/05/2018    K 4.0 11/05/2018     11/05/2018    CO2 22 (L) 11/05/2018    BUN 19 11/05/2018    CREATININE 1.6 (H) 11/05/2018    CALCIUM 9.4 11/05/2018    ANIONGAP 12 11/05/2018    ESTGFRAFRICA 51 (A) 11/05/2018    EGFRNONAA 45 (A) 11/05/2018     Lab Results   Component Value Date    WBC 5.26 11/05/2018    HGB 11.1 (L) 11/05/2018    HCT 35.3 (L) 11/05/2018    MCV 91 11/05/2018     11/05/2018

## 2018-11-05 NOTE — ASSESSMENT & PLAN NOTE
10/31- will continue close podiatry follow up , will use cultures to guide therapy   All previous cultures reviewed-  Newer results are available. Click to view them now.   Component 3mo ago   Aerobic Bacterial Culture CITROBACTER FREUNDII   Rare       Aerobic Bacterial Culture KLEBSIELLA OXYTOCA   Rare         09/2018- MSSA  Will continue Zosyn/Vanco for now.  Vancomycin might be stopped soon    11/3- wound cultures =VRE and enterobacter -will continue Zyvox and will use Rocephin .  Will follow anaerobic cultures    11/4 All cultures reviewed- VRE, enterobacter and stenotrophomonas- will add bactrim for stenotrophomonas and will contact microlab for Avelox sensitivity , continue Zyvox for VRE and Rocephin for enterobacter

## 2018-11-05 NOTE — NURSING
Pt complains of constant pain. Norco not available for 1 more hour. Pt was found to be drowsy after receiving breakthrough Morphine 2 days ago. Sent secure chat to \[facility]\"" regarding pain management.

## 2018-11-05 NOTE — ASSESSMENT & PLAN NOTE
64 y/o male with a h/o of KTx has wound infection after amputation:                  Kidney transplant recipient     CKD stage 3. stable renal function, no change, no worsening  s Cr stable  K normal  Metabolic acidosis, mild, stable  Hyponatremia, mild, stable     H/o of cadaveric kidney transplant in May 2016  Doing well, stable hemodynamically  On immunosuppressive therapy  Last prograf level was within the therapeutic range  No change in dose of prograf, continue same dose  Cellcept is on hold, keep on hold, due to current and active infection     HTN : BP well controlled after amlodipine dose increase  Meds reviewed     H/o of DM  Diabetic nephropathy               * Abscess of left foot     Left foot wound infection post mid foot amputation  Wound cx growing Enterococcus and Enterobacter  Abx reviewed   Agree with current mgmt  Continue zosyn and zyvox             Plans and recommendations:  As discussed above  Will f/u closely.

## 2018-11-05 NOTE — SUBJECTIVE & OBJECTIVE
Interval History: No acute events overnight. Creatinine stable. Lisinopril added for better blood pressure control. Awaiting surgery for wound closure.     Review of Systems   Constitutional: Negative for chills, diaphoresis, fatigue and fever.   HENT: Negative for hearing loss, mouth sores, sore throat, tinnitus and trouble swallowing.    Eyes: Negative for pain, discharge and redness.   Respiratory: Negative for apnea, cough, choking, chest tightness, shortness of breath, wheezing and stridor.    Cardiovascular: Negative for chest pain, palpitations and leg swelling.   Gastrointestinal: Negative for abdominal distention, abdominal pain, blood in stool, constipation, diarrhea, nausea, rectal pain and vomiting.   Endocrine: Negative for cold intolerance, heat intolerance, polydipsia, polyphagia and polyuria.   Genitourinary: Negative for difficulty urinating, dysuria, flank pain, frequency, hematuria and urgency.   Musculoskeletal: Negative for arthralgias, back pain, gait problem, joint swelling, neck pain and neck stiffness.   Skin: Positive for color change and wound. Negative for rash.   Allergic/Immunologic: Negative for food allergies.   Neurological: Negative for dizziness, tremors, seizures, syncope, speech difficulty, light-headedness and headaches.   Hematological: Negative for adenopathy. Does not bruise/bleed easily.   Psychiatric/Behavioral: Negative for agitation and confusion. The patient is not nervous/anxious.    All other systems reviewed and are negative.    Objective:     Vital Signs (Most Recent):  Temp: 97.7 °F (36.5 °C) (11/05/18 1137)  Pulse: 67 (11/05/18 1137)  Resp: 20 (11/05/18 0737)  BP: (!) 149/78 (11/05/18 1137)  SpO2: 98 % (11/05/18 1137) Vital Signs (24h Range):  Temp:  [97.7 °F (36.5 °C)-98.1 °F (36.7 °C)] 97.7 °F (36.5 °C)  Pulse:  [60-70] 67  Resp:  [16-20] 20  SpO2:  [93 %-98 %] 98 %  BP: (107-194)/(55-83) 149/78     Weight: 99.8 kg (220 lb 0.3 oz)  Body mass index is 30.69  kg/m².    Intake/Output Summary (Last 24 hours) at 11/5/2018 1358  Last data filed at 11/5/2018 0600  Gross per 24 hour   Intake 1090 ml   Output 1250 ml   Net -160 ml      Physical Exam   Constitutional: He is oriented to person, place, and time. He appears well-developed and well-nourished. No distress.   HENT:   Head: Normocephalic and atraumatic.   Mouth/Throat: Oropharynx is clear and moist.   Eyes: Conjunctivae and EOM are normal. Pupils are equal, round, and reactive to light.   Neck: Normal range of motion. Neck supple. No JVD present.   Cardiovascular: Normal rate, regular rhythm, normal heart sounds and intact distal pulses. Exam reveals no gallop and no friction rub.   No murmur heard.  Pulmonary/Chest: Effort normal and breath sounds normal. No stridor. No respiratory distress. He has no wheezes. He has no rales. He exhibits no tenderness.   Abdominal: Soft. Bowel sounds are normal. He exhibits no distension and no mass. There is no tenderness. There is no rebound and no guarding.   Musculoskeletal: Normal range of motion. He exhibits no edema or tenderness.   Neurological: He is alert and oriented to person, place, and time. No cranial nerve deficit.   Skin: Skin is warm and dry. No rash noted. He is not diaphoretic. No erythema.   LLE dressing CDI.    Psychiatric: He has a normal mood and affect. His speech is normal and behavior is normal. Judgment and thought content normal. Cognition and memory are normal.   Nursing note and vitals reviewed.      Significant Labs:   BMP:   Recent Labs   Lab 11/05/18  0507   *      K 4.0      CO2 22*   BUN 19   CREATININE 1.6*   CALCIUM 9.4     CBC:   Recent Labs   Lab 11/04/18  0430 11/05/18  0508   WBC 5.08 5.26   HGB 11.4* 11.1*   HCT 36.5* 35.3*    215       Significant Imaging: I have reviewed all pertinent imaging results/findings within the past 24 hours.

## 2018-11-05 NOTE — ASSESSMENT & PLAN NOTE
10/31- will continue close glucose control.  Insulin therapy     11/1- will continue insulin sliding scale , continue close follow up    11/2- insulin sliding scale as per primary team   11/3-  Will continue insulin sliding scale ,diabetic diet     11/4- diabetic diet , insulin sliding scale

## 2018-11-05 NOTE — ASSESSMENT & PLAN NOTE
- patient is noted to be a poor historian and does not know what medications he takes at home  - per d/w Edmundo Hernandez today, he is currently prescribed Prednisone 5 mg daily, Cellcept 500 mg BID, and Prograf 1.5 mg BID  - Nephrology consulted  - Continue home Prednisone and Prograf for now  - Hold Cellcept per Dr. Staley  - Monitor Prograf levels   - Followed by Dr. Blanco in [place]   -Continue current medical management plan   -Nephrology following closely

## 2018-11-05 NOTE — ASSESSMENT & PLAN NOTE
Hx of kidney transplant   Continue prednisone and Prograf per Nephrology  Prograf level in the Am

## 2018-11-05 NOTE — ASSESSMENT & PLAN NOTE
- with delayed wound healing secondary to non-compliance with podiatry recommendations (patient reports getting foot wet and bearing weight)  - Defer care to podiatry  -wound cultures growing Enterobacter and Enterococcus  -scheduled for wound closure on Monday 11/5/18  -Infectious Disease following  -will need LTAC vs SNF  -Will continue zyvox and zosyn   11/4/18  -Surgery planned for tomorrow  -ID following  -IV zosyn discontinued   -Continue Zyvox po and IV Rocephin added   11/5/18  -Awaiting surgery for wound closure

## 2018-11-05 NOTE — PLAN OF CARE
Problem: Patient Care Overview  Goal: Plan of Care Review  Outcome: Ongoing (interventions implemented as appropriate)  Plan of care reviewed with patient. Side effects of medications discussed. Patient request pain medication to be given when next dose is available. Cardiac monitor 8644 running NSR. Patient remains free from injury. Clutter free environment maintained. Call bell in reach. Will continue to monitor

## 2018-11-05 NOTE — OP NOTE
[facility]  Surgery Department  Operative Note    SUMMARY     Date of Procedure: 11/5/2018     Procedure: Procedure(s) (LRB):  CLOSURE, WOUND (Left)  DEBRIDEMENT, METATARSAL BONE, 2 OR MORE BONES (Left)     Surgeon(s) and Role:     * Karla Wheeler DPM - Primary    Assisting Surgeon: None    Pre-Operative Diagnosis: Abscess of left foot [L02.612]  S/P transmetatarsal amputation of foot, left [Z89.432]    Post-Operative Diagnosis: Post-Op Diagnosis Codes:     * Abscess of left foot [L02.612]     * S/P transmetatarsal amputation of foot, left [Z89.432]    Anesthesia: Local MAC with 20ccs of 1:1 mixture of 0.5% marcaine plain and 1% lidocaine plain     Technical Procedures Used:   1. Secondary Wound Closure, Left Foot  2. Irrigation and Debridement of Bone with revisional transmetatarsal amputation, Left foot    Description of the Findings of the Procedure:     The patient was brought to operating room and placed on table in supine position.  A time out was performed to confirm correct patient identification and surgical procedure.  An ankle tourniquet was placed on the operative extremity.  After induction of IV sedation, a local block was injected into the operative extremity in an ankle block fashion utilizing 20cc of 1:1 mix 1% lidocaine plain and 0.5% Marcaine plain.  The operative foot was then scrubbed, prepped and draped in usual sterile manner.    Secondary Wound Closure, Left Foot  Irrigation and Debridement of Bone with revisional transmetatarsal amputation, Left foot    Attention was directed to the open amputation site of the left foot. The area were retention sutures and the medial distal flap were noted to have mild necrosis. The necrotic areas were sharply debrided with a#15 blade.  There was fibrotic non viable tissue throughout the dorsal and plantar flaps of the amputation site. All non viable tissue was removed. The foot was milked from proximally to distally and no purulent  drainage noted. The tourniquet was then inflated. Attention was directed to the metatarsals. All necrotic bone and soft tissue was sharply debrided from the site. It was then decided that due to the amount of necrotic and/or fibrotic tissue, to resect further proximally to have optimal prognosis on wound healing and reduction of tension with the skin flaps. The soft tissue was reflected off the bone.  The power sagittal saw was utilized to resect the metatarsals 1-5 from medially to laterally.  Care was taken to leave a good parabola shape of distal bones with no rohit prominences.  Any remaining non viable tissue and tendon was removed. The bone specimens were sent to pathology for analysis. The wound was then copiously pulse lavaged with 3 liters of Normal Saline. The remaining tissue was evaluated and no further necrosis was noted.  The tourniquet was deflated and hyperemia was noted to the stump of the left foot. Hemostasis was achieved with electrocauterization and hand ties as necessary. The skin flaps were remodeled and freshened with a #15 blade. There was adequate bleeding tissue.  The  skin was closed  with 3-0, 4-0 nylon under normal anatomic tension using simple sutures. Staples were also utilized for further approximation of the skin flaps. The incision site was dressed with adaptic, gauze, and a posterior splint was applied.  The patient tolerated procedure and anesthesia well and was transferred to recovery room with vital signs stable and vascular status to pre operative level.    Significant Surgical Tasks Conducted by the Assistant(s), if Applicable: N/A    Complications: No    Estimated Blood Loss (EBL): 25 mL           Implants: * No implants in log *    Specimens:   Specimen (12h ago, onward)    Start     Ordered    11/05/18 3993  Specimen to Pathology - Surgery  Once     Comments:  1.) Metatarsal bone 1-5: evaluate for osteomyelitis (PERM).DX: Left foot open wound.     Start Status   11/05/18  1435 Collected (11/05/18 1435)       11/05/18 1435                  Condition: Good    Disposition: PACU - hemodynamically stable.    Attestation: I performed the procedure.

## 2018-11-05 NOTE — ASSESSMENT & PLAN NOTE
- BP well controlled  - Continue home Norvasc and Metoprolol  - Monitor  -Blood pressure needing better control, bidil added   -Lisinopril added

## 2018-11-05 NOTE — ASSESSMENT & PLAN NOTE
See plan for s/p transmetatarsal amputation of foot  Wound cultures grew VRE and enterobacter  ID following   Zyvox po and IV Rocephin .  Will follow anaerobic cultures  Bactrim added for stenotrophomonas by Dr. Veliz

## 2018-11-05 NOTE — TRANSFER OF CARE
"Anesthesia Transfer of Care Note    Patient: Lavelle Ladd    Procedure(s) Performed: Procedure(s) (LRB):  CLOSURE, WOUND (Left)  DEBRIDEMENT, METATARSAL BONE, 2 OR MORE BONES (Left)    Patient location: PACU    Anesthesia Type: MAC    Transport from OR: Transported from OR on room air with adequate spontaneous ventilation    Post pain: adequate analgesia    Post assessment: no apparent anesthetic complications and tolerated procedure well    Post vital signs: stable    Level of consciousness: awake    Nausea/Vomiting: no nausea/vomiting    Complications: none    Transfer of care protocol was followed      Last vitals:   Visit Vitals  BP (!) 149/78 (BP Location: Right arm, Patient Position: Sitting)   Pulse 67   Temp 36.5 °C (97.7 °F) (Oral)   Resp 20   Ht 5' 11" (1.803 m)   Wt 99.8 kg (220 lb 0.3 oz)   SpO2 98%   BMI 30.69 kg/m²     "

## 2018-11-05 NOTE — PROGRESS NOTES
[facility] Hospital Medicine  Progress Note    Patient Name: Lavelle Ladd  MRN: [id number]  Patient Class: IP- Inpatient   Admission Date: 10/31/2018  Length of Stay: 5 days  Attending Physician: Ruma Rand MD  Primary Care Provider: Rishabh Esteban MD        Subjective:     Principal Problem:S/P transmetatarsal amputation of foot, left    HPI:  Lavelle Ladd is a 63 y.o. male  with a PMHx of COPD, CAD, Diastolic CHF, CKD, Renal transplant, GERD, Hepatitis C, HLD, HTN, PVD, and IDDM who presented to the hospital today as a direct admit for planned surgery today per Dr. Wheeler.  Left foot xray today showed amputation of the distal aspect of the left foot at the level of the metatarsal bones.  No lytic abnormalities to suggest osteomyelitis by plain radiograph.  No evidence of acute fracture or dislocation.  Bony mineralization is normal.  Diffuse soft tissue and dense vascular calcifications.  Large plantar calcaneal spur.  He is s/p left transmetatarsal amputation secondary to gangrene of multiple digits and JOSE R.  Patient underwent delayed primary wound closure, on 9/24/18.  He presented to podiatry today for his 2 week follow-up of wound closure 2/2 to wound dehiscence.  Patient missed his last postoperative visit as he states he was unable to get a ride to his appointment.  He also admits to ambulating on his amputation site.  He drove himself to clinic today as he was unable to get a ride.  He states that he has gotten his foot wet as he has taken showers covering extremity in a garbage bag.  He denies any fever, chills, foot pain, CP, SOB, N/V/D, and all other symptoms at this time.  Patient has a history of poor compliance and difficult living dispo as he lives alone.  In the past we did try to get patient admitted to skilled nursing facility, but he has refused such services.   Wound cx on 9/21 showed MSSA for which he has completed IV cefazolin for 14 days secondary to MSSA  gangrene.  He has been NPO since midnight.  He will be admitted to the Medicine unit under the care of Hospital Medicine.  ID consulted per podiatry recs to guide antibiotic therapy postoperatively.  He is a Full [employer].  His SDM is his sister Kecia Williamson who can be reached at [phone].                Hospital Course:  11/1/18 The patient is S/P I&D of left foot yesterday. No acute issues overnight. The patient reports that his pain is well controlled. Continue IV ABX, cultures pending. Infectious disease is following. 11/2/18 wound culture growing Enterococcus and Enterobacter. Podiatry recommends SNF vs LTAC placement due to potential for limb loss. 11/3/18- Will continue zyvox and zosyn. Creatinine level at baseline. Surgery planned for Monday by Dr. Wheeler for secondary wound closure. 11/4/18- Patient awake and alert today. Dr. Guthrie at bedside performing wound care to left transmetatarsal amputation stump. Patient tolerated well. Plan for surgery tomorrow. Continue zyvox, IV zosyn discontinued and IV Rocephin added. 11/5/18-Creatinine stable. Blood pressure elevate this morning, lisinopril added per nephrology. Awaiting surgery for wound closure.               Interval History: No acute events overnight. Creatinine stable. Lisinopril added for better blood pressure control. Awaiting surgery for wound closure.     Review of Systems   Constitutional: Negative for chills, diaphoresis, fatigue and fever.   HENT: Negative for hearing loss, mouth sores, sore throat, tinnitus and trouble swallowing.    Eyes: Negative for pain, discharge and redness.   Respiratory: Negative for apnea, cough, choking, chest tightness, shortness of breath, wheezing and stridor.    Cardiovascular: Negative for chest pain, palpitations and leg swelling.   Gastrointestinal: Negative for abdominal distention, abdominal pain, blood in stool, constipation, diarrhea, nausea, rectal pain and vomiting.   Endocrine: Negative for cold  intolerance, heat intolerance, polydipsia, polyphagia and polyuria.   Genitourinary: Negative for difficulty urinating, dysuria, flank pain, frequency, hematuria and urgency.   Musculoskeletal: Negative for arthralgias, back pain, gait problem, joint swelling, neck pain and neck stiffness.   Skin: Positive for color change and wound. Negative for rash.   Allergic/Immunologic: Negative for food allergies.   Neurological: Negative for dizziness, tremors, seizures, syncope, speech difficulty, light-headedness and headaches.   Hematological: Negative for adenopathy. Does not bruise/bleed easily.   Psychiatric/Behavioral: Negative for agitation and confusion. The patient is not nervous/anxious.    All other systems reviewed and are negative.    Objective:     Vital Signs (Most Recent):  Temp: 97.7 °F (36.5 °C) (11/05/18 1137)  Pulse: 67 (11/05/18 1137)  Resp: 20 (11/05/18 0737)  BP: (!) 149/78 (11/05/18 1137)  SpO2: 98 % (11/05/18 1137) Vital Signs (24h Range):  Temp:  [97.7 °F (36.5 °C)-98.1 °F (36.7 °C)] 97.7 °F (36.5 °C)  Pulse:  [60-70] 67  Resp:  [16-20] 20  SpO2:  [93 %-98 %] 98 %  BP: (107-194)/(55-83) 149/78     Weight: 99.8 kg (220 lb 0.3 oz)  Body mass index is 30.69 kg/m².    Intake/Output Summary (Last 24 hours) at 11/5/2018 1358  Last data filed at 11/5/2018 0600  Gross per 24 hour   Intake 1090 ml   Output 1250 ml   Net -160 ml      Physical Exam   Constitutional: He is oriented to person, place, and time. He appears well-developed and well-nourished. No distress.   HENT:   Head: Normocephalic and atraumatic.   Mouth/Throat: Oropharynx is clear and moist.   Eyes: Conjunctivae and EOM are normal. Pupils are equal, round, and reactive to light.   Neck: Normal range of motion. Neck supple. No JVD present.   Cardiovascular: Normal rate, regular rhythm, normal heart sounds and intact distal pulses. Exam reveals no gallop and no friction rub.   No murmur heard.  Pulmonary/Chest: Effort normal and breath sounds  normal. No stridor. No respiratory distress. He has no wheezes. He has no rales. He exhibits no tenderness.   Abdominal: Soft. Bowel sounds are normal. He exhibits no distension and no mass. There is no tenderness. There is no rebound and no guarding.   Musculoskeletal: Normal range of motion. He exhibits no edema or tenderness.   Neurological: He is alert and oriented to person, place, and time. No cranial nerve deficit.   Skin: Skin is warm and dry. No rash noted. He is not diaphoretic. No erythema.   LLE dressing CDI.    Psychiatric: He has a normal mood and affect. His speech is normal and behavior is normal. Judgment and thought content normal. Cognition and memory are normal.   Nursing note and vitals reviewed.      Significant Labs:   BMP:   Recent Labs   Lab 11/05/18  0507   *      K 4.0      CO2 22*   BUN 19   CREATININE 1.6*   CALCIUM 9.4     CBC:   Recent Labs   Lab 11/04/18  0430 11/05/18  0508   WBC 5.08 5.26   HGB 11.4* 11.1*   HCT 36.5* 35.3*    215       Significant Imaging: I have reviewed all pertinent imaging results/findings within the past 24 hours.    Assessment/Plan:      * S/P transmetatarsal amputation of foot, left    - with delayed wound healing secondary to non-compliance with podiatry recommendations (patient reports getting foot wet and bearing weight)  - Defer care to podiatry  -wound cultures growing Enterobacter and Enterococcus  -scheduled for wound closure on Monday 11/5/18  -Infectious Disease following  -will need LTAC vs SNF  -Will continue zyvox and zosyn   11/4/18  -Surgery planned for tomorrow  -ID following  -IV zosyn discontinued   -Continue Zyvox po and IV Rocephin added   11/5/18  -Awaiting surgery for wound closure      Immunosuppression    Hx of kidney transplant   Continue prednisone and Prograf per Nephrology  Prograf level in the Am        Hx of right BKA    - Site is intact, well healed   -No s/s of infection          Non compliance with  medical treatment    - Educated on the importance of adhering to medical treatment plan  - Patient has a limited support system  - Social work consult for DC planning       Abscess of left foot    See plan for s/p transmetatarsal amputation of foot  Wound cultures grew VRE and enterobacter  ID following   Zyvox po and IV Rocephin .  Will follow anaerobic cultures  Bactrim added for stenotrophomonas by Dr. Veliz        Cellulitis of left foot    - See plan as per above         Peripheral vascular disease    - Resume home ASA   - Vascular f/u as needed       Chronic bilateral low back pain with left-sided sciatica    - Resume home Norco PRN  - Monitor  -Pain controlled     Kidney transplant recipient    - patient is noted to be a poor historian and does not know what medications he takes at home  - per d/w Edmundo Hernandez today, he is currently prescribed Prednisone 5 mg daily, Cellcept 500 mg BID, and Prograf 1.5 mg BID  - Nephrology consulted  - Continue home Prednisone and Prograf for now  - Hold Cellcept per Dr. Staley  - Monitor Prograf levels   - Followed by Dr. Blanco in [place]   -Continue current medical management plan   -Nephrology following closely         Stage 3 chronic kidney disease    -Creatinine 1.8 today. Slight increase. Closely monitor  - Daily BMP  - Monitor  -Creatinine 1.5 at baseline   -Creatinine stable  -Nephrology following   -Remains stable        Chronic obstructive pulmonary disease    - Currently appears compensated  - Continue home meds  - Supplemental O2 PRN  - Monitor  -No change      Coronary artery disease involving native coronary artery of native heart without angina pectoris    -Continue Cardiac medications  -Cardiac monitoring  -Stable        Type 2 diabetes mellitus with hyperglycemia, with long-term current use of insulin    - A1c 8.0 in September  -Glucose slightly uncontrolled  - Accu checks ACHS with SSI PRN  - add NPH at lower doses  - Monitor  -SSI dose increased to  moderate   -Continue to monitor adjust insulin accordingly        Essential hypertension    - BP well controlled  - Continue home Norvasc and Metoprolol  - Monitor  -Blood pressure needing better control, bidil added   -Lisinopril added        VTE Risk Mitigation (From admission, onward)        Ordered     IP VTE HIGH RISK PATIENT  Once      10/31/18 7581     Place sequential compression device  Until discontinued      10/31/18 7196              Carla Irby NP  Department of Hospital Medicine   [facility]

## 2018-11-05 NOTE — PROGRESS NOTES
[facility]  Infectious Disease  Progress Note    Patient Name: Lavelle Ladd  MRN: [id number]  Admission Date: 10/31/2018  Length of Stay: 5 days  Attending Physician: Shelton Edgar MD  Primary Care Provider: Rishabh Esteban MD    Isolation Status: Contact  Assessment/Plan:      Abscess of left foot    10/31- will continue close podiatry follow up , will use cultures to guide therapy   All previous cultures reviewed-  Newer results are available. Click to view them now.   Component 3mo ago   Aerobic Bacterial Culture CITROBACTER FREUNDII   Rare       Aerobic Bacterial Culture KLEBSIELLA OXYTOCA   Rare         09/2018- MSSA  Will continue Zosyn/Vanco for now.  Vancomycin might be stopped soon    11/3- wound cultures =VRE and enterobacter -will continue Zyvox and will use Rocephin .  Will follow anaerobic cultures    11/4 All cultures reviewed- VRE, enterobacter and stenotrophomonas- will add bactrim for stenotrophomonas and will contact microlab for Avelox sensitivity , continue Zyvox for VRE and Rocephin for enterobacter      Peripheral vascular disease    11/1-will follow vascular surgery on discharge      11/2- will continue vascular surgery follow up     11/4- will continue close follow up by vascular surgery      Stage 3 chronic kidney disease    10/31- will continue close monitoring , avoid nephrotoxic meds.    11/3- will continue close monitoring of serum creatinine , avoid nephrotoxic  meds    11/4- nephrology follow up , avoid nephrotoxic meds      Type 2 diabetes mellitus with hyperglycemia, with long-term current use of insulin    10/31- will continue close glucose control.  Insulin therapy     11/1- will continue insulin sliding scale , continue close follow up    11/2- insulin sliding scale as per primary team   11/3-  Will continue insulin sliding scale ,diabetic diet     11/4- diabetic diet , insulin sliding scale          Anticipated Disposition:     Thank you for your consult.  I will follow-up with patient. Please contact us if you have any additional questions.    Ronny Veliz MD  Infectious Disease  [facility]    Subjective:     Principal Problem:S/P transmetatarsal amputation of foot, left    HPI:      63 year old  male  with a PMHx of COPD, CAD, Diastolic CHF, CKD, Renal transplant, GERD, Hepatitis C, HLD, HTN, PVD, and IDDM who presented to the hospital as a direct admit for planned surgery today per Dr. Wheeler.   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  Patient underwent delayed primary wound closure, on 9/24/18.  He presented to podiatry today for his 2 week follow-up of wound closure 2/2 to wound dehiscence.    All previous cultures reviewed -  09/21-    Wound cx on 9/21 showed MSSA  07/2018-  Newer results are available. Click to view them now.   Component 3mo ago   Aerobic Bacterial Culture CITROBACTER FREUNDII   Rare       Aerobic Bacterial Culture KLEBSIELLA OXYTOCA   Rare           He had on 10/31-   1. Left foot Irrigation and debridement to bone  2. Revisional Transmetatarsal Amputation, Left foot        Interval History: 63 year old man with diabetic foot.  All previous cultures reviewed   10/31- GNR  09/21- MSSA  He does not want LTAC   11/2- latest wound cultures -enterococcus/enterobacter   He is afebrile   11/3-wound cultures -VRE/ enterobacter   He says he does not feel well     11/4- more wound cultures= stenotrophomonas  VRE and enterobacter   He is afebrile   Review of Systems   Constitutional: Negative for chills, diaphoresis, fatigue and fever.   HENT: Negative for hearing loss, mouth sores, sore throat, tinnitus and trouble swallowing.    Eyes: Negative for pain, discharge and redness.   Respiratory: Negative for apnea, cough, choking, chest tightness, shortness of breath, wheezing and stridor.    Cardiovascular: Negative for chest pain, palpitations and leg swelling.   Gastrointestinal:  Negative for abdominal distention, abdominal pain, blood in stool, constipation, diarrhea, nausea, rectal pain and vomiting.   Endocrine: Negative for cold intolerance, heat intolerance, polydipsia, polyphagia and polyuria.   Genitourinary: Negative for difficulty urinating, dysuria, flank pain, frequency, hematuria and urgency.   Musculoskeletal: Negative for arthralgias, back pain, gait problem, joint swelling, neck pain and neck stiffness.   Skin: Positive for color change and wound. Negative for rash.        Left foot with worsening erythema.    Allergic/Immunologic: Negative for food allergies.   Neurological: Negative for dizziness, tremors, seizures, syncope, speech difficulty, light-headedness and headaches.   Hematological: Negative for adenopathy. Does not bruise/bleed easily.   Psychiatric/Behavioral: Negative for agitation and confusion. The patient is not nervous/anxious.    All other systems reviewed and are negative.    Objective:     Vital Signs (Most Recent):  Temp: 97.8 °F (36.6 °C) (11/05/18 0447)  Pulse: 66 (11/05/18 0521)  Resp: 18 (11/05/18 0447)  BP: 134/78 (11/05/18 0447)  SpO2: (!) 93 % (11/05/18 0447) Vital Signs (24h Range):  Temp:  [97.7 °F (36.5 °C)-98.6 °F (37 °C)] 97.8 °F (36.6 °C)  Pulse:  [57-70] 66  Resp:  [14-18] 18  SpO2:  [93 %-97 %] 93 %  BP: (107-184)/(55-79) 134/78     Weight: 99.8 kg (220 lb 0.3 oz)  Body mass index is 30.69 kg/m².    Estimated Creatinine Clearance: 55.4 mL/min (A) (based on SCr of 1.6 mg/dL (H)).    Physical Exam   Constitutional: He is oriented to person, place, and time. He appears well-developed and well-nourished. No distress.   HENT:   Head: Normocephalic and atraumatic.   Mouth/Throat: Oropharynx is clear and moist.   Eyes: Conjunctivae and EOM are normal. Pupils are equal, round, and reactive to light.   Neck: Normal range of motion. Neck supple. No JVD present.   Cardiovascular: Normal rate, regular rhythm, normal heart sounds and intact distal  pulses. Exam reveals no gallop and no friction rub.   No murmur heard.  Pulmonary/Chest: Effort normal and breath sounds normal. No stridor. No respiratory distress. He has no wheezes. He has no rales. He exhibits no tenderness.   Abdominal: Soft. Bowel sounds are normal. He exhibits no distension and no mass. There is no tenderness. There is no rebound and no guarding.   Musculoskeletal: Normal range of motion. He exhibits no edema or tenderness.   Dressing over foot noted   Neurological: He is alert and oriented to person, place, and time. No cranial nerve deficit.   Skin: Skin is warm and dry. No rash noted. He is not diaphoretic. There is erythema.   -   Psychiatric: He has a normal mood and affect. His speech is normal. Judgment and thought content normal. He is withdrawn. Cognition and memory are normal. He is inattentive.   Nursing note and vitals reviewed.      Significant Labs:   Blood Culture:   Recent Labs   Lab 09/18/18  1812 09/21/18  0907 09/21/18  1516 10/31/18  1054 10/31/18  1055   LABBLOO Gram stain aer bottle: Gram positive cocci in clusters resembling Staph   Gram stain susan bottle: Gram positive cocci in clusters resembling Staph   Results called to and read back by:NOA Eckert RN 09/19/2018  15:31  STAPHYLOCOCCUS AUREUS  ID consult required at [facility].  For susceptibility see order # [id number]   No growth after 5 days. No growth after 5 days. No Growth to date  No Growth to date  No Growth to date  No Growth to date  No Growth to date No Growth to date  No Growth to date  No Growth to date  No Growth to date  No Growth to date     BMP:   Recent Labs   Lab 11/05/18  0507   *      K 4.0      CO2 22*   BUN 19   CREATININE 1.6*   CALCIUM 9.4     CBC:   Recent Labs   Lab 11/04/18  0430 11/05/18  0508   WBC 5.08 5.26   HGB 11.4* 11.1*   HCT 36.5* 35.3*    215     CMP:   Recent Labs   Lab 11/04/18  0430 11/05/18  0507   * 137    K 4.0 4.0    103   CO2 20* 22*   * 211*   BUN 19 19   CREATININE 1.6* 1.6*   CALCIUM 9.3 9.4   ANIONGAP 12 12   EGFRNONAA 45* 45*     Wound Culture:   Recent Labs   Lab 07/09/18  0856 09/21/18  1235 10/18/18  1328 10/31/18  0911 10/31/18  1640   LABAERO CITROBACTER FREUNDII  Rare    KLEBSIELLA OXYTOCA  Rare   STAPHYLOCOCCUS AUREUS  Few  Skin skylar also present   Skin skylar,  no predominant organism ENTEROBACTER CLOACAE  Many    STENOTROPHOMONAS (X.) MALTOPHILIA  Moderate  Susceptibility pending   ENTEROCOCCUS FAECIUM VRE  Rare    ENTEROBACTER CLOACAE  Rare         Significant Imaging: I have reviewed all pertinent imaging results/findings within the past 24 hours.

## 2018-11-06 PROBLEM — L03.116 CELLULITIS OF LEFT FOOT: Status: RESOLVED | Noted: 2018-07-20 | Resolved: 2018-11-06

## 2018-11-06 LAB
ANION GAP SERPL CALC-SCNC: 13 MMOL/L
BACTERIA SPEC ANAEROBE CULT: NORMAL
BACTERIA SPEC ANAEROBE CULT: NORMAL
BASOPHILS # BLD AUTO: 0.01 K/UL
BASOPHILS NFR BLD: 0.1 %
BUN SERPL-MCNC: 21 MG/DL
CALCIUM SERPL-MCNC: 9.4 MG/DL
CHLORIDE SERPL-SCNC: 105 MMOL/L
CO2 SERPL-SCNC: 22 MMOL/L
CREAT SERPL-MCNC: 1.8 MG/DL
DIFFERENTIAL METHOD: ABNORMAL
EOSINOPHIL # BLD AUTO: 0.1 K/UL
EOSINOPHIL NFR BLD: 1.1 %
ERYTHROCYTE [DISTWIDTH] IN BLOOD BY AUTOMATED COUNT: 16.6 %
EST. GFR  (AFRICAN AMERICAN): 45 ML/MIN/1.73 M^2
EST. GFR  (NON AFRICAN AMERICAN): 39 ML/MIN/1.73 M^2
GLUCOSE SERPL-MCNC: 95 MG/DL
HCT VFR BLD AUTO: 35.4 %
HGB BLD-MCNC: 11 G/DL
LYMPHOCYTES # BLD AUTO: 1.8 K/UL
LYMPHOCYTES NFR BLD: 25.5 %
MCH RBC QN AUTO: 28.5 PG
MCHC RBC AUTO-ENTMCNC: 31.1 G/DL
MCV RBC AUTO: 92 FL
MONOCYTES # BLD AUTO: 0.6 K/UL
MONOCYTES NFR BLD: 8.1 %
NEUTROPHILS # BLD AUTO: 4.7 K/UL
NEUTROPHILS NFR BLD: 65.2 %
PLATELET # BLD AUTO: 249 K/UL
PMV BLD AUTO: 10 FL
POCT GLUCOSE: 101 MG/DL (ref 70–110)
POCT GLUCOSE: 119 MG/DL (ref 70–110)
POCT GLUCOSE: 142 MG/DL (ref 70–110)
POCT GLUCOSE: 170 MG/DL (ref 70–110)
POCT GLUCOSE: 174 MG/DL (ref 70–110)
POTASSIUM SERPL-SCNC: 4.3 MMOL/L
RBC # BLD AUTO: 3.86 M/UL
SODIUM SERPL-SCNC: 140 MMOL/L
TACROLIMUS BLD-MCNC: 5.8 NG/ML
WBC # BLD AUTO: 7.15 K/UL

## 2018-11-06 PROCEDURE — 21400001 HC TELEMETRY ROOM: Mod: HCNC

## 2018-11-06 PROCEDURE — 25000003 PHARM REV CODE 250: Mod: HCNC | Performed by: ANESTHESIOLOGY

## 2018-11-06 PROCEDURE — 25000003 PHARM REV CODE 250: Mod: HCNC | Performed by: NURSE PRACTITIONER

## 2018-11-06 PROCEDURE — 25000003 PHARM REV CODE 250: Mod: HCNC | Performed by: HOSPITALIST

## 2018-11-06 PROCEDURE — 25000003 PHARM REV CODE 250: Mod: HCNC | Performed by: INTERNAL MEDICINE

## 2018-11-06 PROCEDURE — 25000242 PHARM REV CODE 250 ALT 637 W/ HCPCS: Mod: HCNC | Performed by: PODIATRIST

## 2018-11-06 PROCEDURE — 25000003 PHARM REV CODE 250: Mod: HCNC | Performed by: PODIATRIST

## 2018-11-06 PROCEDURE — A4216 STERILE WATER/SALINE, 10 ML: HCPCS | Mod: HCNC | Performed by: ANESTHESIOLOGY

## 2018-11-06 PROCEDURE — 63600175 PHARM REV CODE 636 W HCPCS: Mod: HCNC | Performed by: NURSE PRACTITIONER

## 2018-11-06 PROCEDURE — 80197 ASSAY OF TACROLIMUS: CPT | Mod: HCNC

## 2018-11-06 PROCEDURE — 96372 THER/PROPH/DIAG INJ SC/IM: CPT | Mod: HCNC

## 2018-11-06 PROCEDURE — 85025 COMPLETE CBC W/AUTO DIFF WBC: CPT | Mod: HCNC

## 2018-11-06 PROCEDURE — 99024 POSTOP FOLLOW-UP VISIT: CPT | Mod: HCNC,,, | Performed by: PODIATRIST

## 2018-11-06 PROCEDURE — 36415 COLL VENOUS BLD VENIPUNCTURE: CPT | Mod: HCNC

## 2018-11-06 PROCEDURE — 99233 SBSQ HOSP IP/OBS HIGH 50: CPT | Mod: HCNC,,, | Performed by: INTERNAL MEDICINE

## 2018-11-06 PROCEDURE — 63600175 PHARM REV CODE 636 W HCPCS: Mod: HCNC | Performed by: INTERNAL MEDICINE

## 2018-11-06 PROCEDURE — 94640 AIRWAY INHALATION TREATMENT: CPT | Mod: HCNC

## 2018-11-06 PROCEDURE — 80048 BASIC METABOLIC PNL TOTAL CA: CPT | Mod: HCNC

## 2018-11-06 RX ORDER — ACETAMINOPHEN 325 MG/1
650 TABLET ORAL EVERY 6 HOURS PRN
Status: DISCONTINUED | OUTPATIENT
Start: 2018-11-06 | End: 2018-11-13 | Stop reason: HOSPADM

## 2018-11-06 RX ORDER — ACETAMINOPHEN 650 MG/20.3ML
650 LIQUID ORAL EVERY 4 HOURS PRN
Status: DISCONTINUED | OUTPATIENT
Start: 2018-11-06 | End: 2018-11-08

## 2018-11-06 RX ADMIN — ACETAMINOPHEN 650 MG: 325 TABLET ORAL at 01:11

## 2018-11-06 RX ADMIN — TACROLIMUS 1.5 MG: 0.5 CAPSULE ORAL at 07:11

## 2018-11-06 RX ADMIN — Medication 3 ML: at 01:11

## 2018-11-06 RX ADMIN — HYDROCODONE BITARTRATE AND ACETAMINOPHEN 1 TABLET: 10; 325 TABLET ORAL at 10:11

## 2018-11-06 RX ADMIN — HYDRALAZINE HYDROCHLORIDE AND ISOSORBIDE DINITRATE 1 TABLET: 37.5; 2 TABLET, FILM COATED ORAL at 10:11

## 2018-11-06 RX ADMIN — SODIUM BICARBONATE 650 MG TABLET 2600 MG: at 09:11

## 2018-11-06 RX ADMIN — Medication 3 ML: at 09:11

## 2018-11-06 RX ADMIN — BENZONATATE 100 MG: 100 CAPSULE ORAL at 12:11

## 2018-11-06 RX ADMIN — GABAPENTIN 300 MG: 300 CAPSULE ORAL at 09:11

## 2018-11-06 RX ADMIN — LISINOPRIL 10 MG: 10 TABLET ORAL at 10:11

## 2018-11-06 RX ADMIN — ARFORMOTEROL TARTRATE 15 MCG: 15 SOLUTION RESPIRATORY (INHALATION) at 07:11

## 2018-11-06 RX ADMIN — METOPROLOL TARTRATE 25 MG: 25 TABLET ORAL at 10:11

## 2018-11-06 RX ADMIN — AMLODIPINE BESYLATE 10 MG: 10 TABLET ORAL at 10:11

## 2018-11-06 RX ADMIN — ASPIRIN 81 MG: 81 TABLET, COATED ORAL at 10:11

## 2018-11-06 RX ADMIN — GABAPENTIN 300 MG: 300 CAPSULE ORAL at 10:11

## 2018-11-06 RX ADMIN — Medication 3 ML: at 06:11

## 2018-11-06 RX ADMIN — SODIUM CHLORIDE: 0.45 INJECTION, SOLUTION INTRAVENOUS at 12:11

## 2018-11-06 RX ADMIN — HUMAN INSULIN 20 UNITS: 100 INJECTION, SUSPENSION SUBCUTANEOUS at 05:11

## 2018-11-06 RX ADMIN — HYDROCODONE BITARTRATE AND ACETAMINOPHEN 1 TABLET: 10; 325 TABLET ORAL at 03:11

## 2018-11-06 RX ADMIN — SULFAMETHOXAZOLE AND TRIMETHOPRIM 1 TABLET: 800; 160 TABLET ORAL at 10:11

## 2018-11-06 RX ADMIN — CEFTRIAXONE 1 G: 1 INJECTION, SOLUTION INTRAVENOUS at 10:11

## 2018-11-06 RX ADMIN — SULFAMETHOXAZOLE AND TRIMETHOPRIM 1 TABLET: 800; 160 TABLET ORAL at 09:11

## 2018-11-06 RX ADMIN — GABAPENTIN 300 MG: 300 CAPSULE ORAL at 03:11

## 2018-11-06 RX ADMIN — TACROLIMUS 1.5 MG: 0.5 CAPSULE ORAL at 05:11

## 2018-11-06 RX ADMIN — LINEZOLID 600 MG: 600 TABLET, FILM COATED ORAL at 09:11

## 2018-11-06 RX ADMIN — LINEZOLID 600 MG: 600 TABLET, FILM COATED ORAL at 10:11

## 2018-11-06 RX ADMIN — BUDESONIDE 0.5 MG: 0.5 SUSPENSION RESPIRATORY (INHALATION) at 07:11

## 2018-11-06 RX ADMIN — PREDNISONE 5 MG: 5 TABLET ORAL at 10:11

## 2018-11-06 RX ADMIN — SODIUM BICARBONATE 650 MG TABLET 2600 MG: at 10:11

## 2018-11-06 NOTE — PROGRESS NOTES
[facility]  Podiatry  Progress Note    Patient Name: Lavelle Ladd  MRN: [id number]  Admission Date: 10/31/2018  Hospital Length of Stay: 6 days  Attending Physician: Ruma Rand MD  Primary Care Provider: Rishabh Esteban MD     Subjective:     Interval History: 63M, s/p 10/31/2018 incision and drainage to the level of bone, left foot, with revisional transmetatarsal amputation, open. Now s/p 11/05/2018 secondary closure of wound, with debridement of bone to the transmetatarsal amputation site, left foot. No other interval change in medical history since my last evaluation with this patient on Sunday afternoon.    Follow-up For: Procedure(s) (LRB):  AMPUTATION, FOOT, TRANSMETATARSAL (Left)  IRRIGATION AND DEBRIDEMENT, LOWER EXTREMITY (Left)    Post-Operative Day: 4 Days Post-Op    Scheduled Meds:   amLODIPine  10 mg Oral Daily    arformoterol  15 mcg Nebulization Q12H    aspirin  81 mg Oral Daily    budesonide  0.5 mg Nebulization Q12H    cefTRIAXone (ROCEPHIN) IVPB  1 g Intravenous Q24H    gabapentin  300 mg Oral TID    insulin NPH  20 Units Subcutaneous BID AC    isosorbide-hydrALAZINE 20-37.5 mg  1 tablet Oral TID    linezolid  600 mg Oral Q12H    lisinopril  10 mg Oral Daily    meperidine  12.5 mg Intravenous Once    metoprolol tartrate  25 mg Oral BID    nozaseptin   Each Nare BID    predniSONE  5 mg Oral Daily    sodium bicarbonate  2,600 mg Oral BID    sodium chloride 0.9%  3 mL Intravenous Q8H    sulfamethoxazole-trimethoprim 800-160mg  1 tablet Oral BID    tacrolimus  1.5 mg Oral BID     Continuous Infusions:  PRN Meds:benzonatate, dextrose 50%, dextrose 50%, fentaNYL, glucagon (human recombinant), glucose, glucose, HYDROcodone-acetaminophen, insulin aspart U-100, metoclopramide HCl, morphine, ondansetron, ondansetron, oxyCODONE, promethazine (PHENERGAN) IVPB, sodium chloride 0.9%, sodium chloride 0.9%, sodium chloride 0.9%, sodium chloride 0.9%    Review of  Systems   Constitutional: Negative for chills, fatigue and fever.   HENT: Negative for hearing loss.    Eyes: Negative for photophobia and visual disturbance.   Respiratory: Negative for cough, chest tightness, shortness of breath and wheezing.    Cardiovascular: Negative for chest pain and palpitations.   Gastrointestinal: Negative for constipation, diarrhea, nausea and vomiting.   Endocrine: Negative for cold intolerance and heat intolerance.   Genitourinary: Negative for flank pain.   Musculoskeletal: Positive for back pain and gait problem. Negative for neck pain and neck stiffness.   Skin: Positive for color change and wound.   Neurological: Positive for numbness. Negative for light-headedness and headaches.   Psychiatric/Behavioral: Negative for sleep disturbance.     Objective:     Vital Signs (Most Recent):  Temp: (!) 100.4 °F (38 °C) (11/06/18 1251)  Pulse: 81 (11/06/18 1251)  Resp: 18 (11/06/18 1251)  BP: (!) 108/55 (11/06/18 1251)  SpO2: (!) 91 % (11/06/18 1251) Vital Signs (24h Range):  Temp:  [97.6 °F (36.4 °C)-100.4 °F (38 °C)] 100.4 °F (38 °C)  Pulse:  [60-81] 81  Resp:  [16-18] 18  SpO2:  [90 %-100 %] 91 %  BP: ()/(52-74) 108/55     Weight: 99.8 kg (220 lb 0.3 oz)  Body mass index is 30.69 kg/m².    Foot Exam    Laboratory:  A1C:   Recent Labs   Lab 06/12/18  0901 07/07/18  0432 09/19/18  0520   HGBA1C 9.9* 10.2* 8.0*     ABGs: No results for input(s): PH, PCO2, HCO3, POCSATURATED, BE in the last 168 hours.  Blood Cultures: No results for input(s): LABBLOO in the last 48 hours.  BMP:   Recent Labs   Lab 10/31/18  1052  11/06/18  0435   GLU  --    < > 95   NA  --    < > 140   K  --    < > 4.3   CL  --    < > 105   CO2  --    < > 22*   BUN  --    < > 21   CREATININE  --    < > 1.8*   CALCIUM  --    < > 9.4   MG 1.9  --   --     < > = values in this interval not displayed.     Cardiac Markers: No results for input(s): CKMB, TROPONINT, MYOGLOBIN in the last 168 hours.  CBC:   Recent Labs   Lab  11/06/18  0435   WBC 7.15   RBC 3.86*   HGB 11.0*   HCT 35.4*      MCV 92   MCH 28.5   MCHC 31.1*     CMP:   Recent Labs   Lab 10/31/18  1053  11/06/18  0435   *   < > 95   CALCIUM 9.5   < > 9.4   ALBUMIN 3.3*  --   --    PROT 7.2  --   --       < > 140   K 4.8   < > 4.3   CO2 23   < > 22*      < > 105   BUN 20   < > 21   CREATININE 1.5*   < > 1.8*   ALKPHOS 86  --   --    ALT 12  --   --    AST 20  --   --    BILITOT 0.6  --   --     < > = values in this interval not displayed.     Coagulation:   Recent Labs   Lab 10/31/18  1126   INR 1.0     CPS: {:LNK,LABRCNTIP[  CRP:   Recent Labs   Lab 10/31/18  1053   CRP 1.2     ESR:   Recent Labs   Lab 10/31/18  1054   SEDRATE 14*     LFTs:   Recent Labs   Lab 10/31/18  1053   ALT 12   AST 20   ALKPHOS 86   BILITOT 0.6   PROT 7.2   ALBUMIN 3.3*     Prealbumin: No results for input(s): PREALBUMIN in the last 48 hours.  Wound Cultures:   Recent Labs   Lab 07/09/18  0856 09/21/18  1235 10/18/18  1328 10/31/18  0911 10/31/18  1640   LABAERO CITROBACTER FREUNDII  Rare    KLEBSIELLA OXYTOCA  Rare   STAPHYLOCOCCUS AUREUS  Few  Skin skylar also present   Skin skylar,  no predominant organism ENTEROBACTER CLOACAE  Many    STENOTROPHOMONAS (X.) MALTOPHILIA  Moderate   ENTEROCOCCUS FAECIUM VRE  Rare    ENTEROBACTER CLOACAE  Rare       Microbiology Results (last 7 days)     Procedure Component Value Units Date/Time    Culture, Anaerobic [[id number]] Collected:  10/31/18 1640    Order Status:  Completed Specimen:  Incision site from Foot, Left Updated:  11/06/18 0934     Anaerobic Culture No anaerobes isolated    Blood culture [[id number]] Collected:  10/31/18 1055    Order Status:  Completed Specimen:  Blood Updated:  11/05/18 1812     Blood Culture, Routine No growth after 5 days.    Blood culture [[id number]] Collected:  10/31/18 1054    Order Status:  Completed Specimen:  Blood Updated:  11/05/18 1812     Blood Culture, Routine No growth after 5 days.     Aerobic culture [[id number]]  (Susceptibility) Collected:  10/31/18 1640    Order Status:  Completed Specimen:  Incision site from Foot, Left Updated:  11/03/18 1139     Aerobic Bacterial Culture --     ENTEROCOCCUS FAECIUM VRE  Rare       Aerobic Bacterial Culture --     ENTEROBACTER CLOACAE  Rare      Blood culture [[id number]]     Order Status:  Canceled Specimen:  Blood     Blood culture [[id number]]     Order Status:  Canceled Specimen:  Blood         Specimen (12h ago, onward)    None          Diagnostic Results:  I have reviewed all pertinent imaging results/findings within the past 24 hours.    Clinical Findings:  CFT to the flap is WNL. No drainage or malodor is noted. Sutures and stables intact. No erythema or cellulitis is noted.    Assessment/Plan:     Active Diagnoses:    Diagnosis Date Noted POA    PRINCIPAL PROBLEM:  S/P transmetatarsal amputation of foot, left [Z89.432] 10/31/2018 Not Applicable    Immunosuppression [D89.9]  Yes    Hx of right BKA [Z89.511] 11/01/2018 Not Applicable    Abscess of left foot [L02.612] 10/31/2018 Unknown    Non compliance with medical treatment [Z91.19] 10/31/2018 Not Applicable    Cellulitis of left foot [L03.116] 07/20/2018 Yes    Peripheral vascular disease [I73.9] 07/06/2018 Yes    Chronic bilateral low back pain with left-sided sciatica [M54.42, G89.29] 01/25/2018 Yes    Kidney transplant recipient [Z94.0] 04/07/2017 Not Applicable    Stage 3 chronic kidney disease [N18.3] 09/25/2016 Yes     Chronic    Chronic obstructive pulmonary disease [J44.9] 09/12/2016 Yes    Coronary artery disease involving native coronary artery of native heart without angina pectoris [I25.10] 07/01/2016 Yes    Type 2 diabetes mellitus with hyperglycemia, with long-term current use of insulin [E11.65, Z79.4]  Not Applicable    Essential hypertension [I10] 02/20/2015 Yes      Problems Resolved During this Admission:       63M, s/p 10/31/2018 incision and drainage to the level of  bone, left foot, with revisional transmetatarsal amputation, open. Now s/p 11/05/2018 secondary closure of wound, with debridement of bone to the transmetatarsal amputation site, left foot.    Local woundcare this afternoon with Xeroform + wet-2-dry saline dressings w/ posterior splint..     Rec. D/C to LTAC for at least 2 weeks to monitor wound healing progress, and as to somewhat force compliance. Upon D/C, please perform Q3 days dressings changes. NWB to the LLE. Patient may follow up with Dr. Wheeler within 2 weeks of D/C from LTAC, or prior, if wound decompensation.     Tex Guthrie DPM  Podiatry  [facility]

## 2018-11-06 NOTE — ASSESSMENT & PLAN NOTE
- with delayed wound healing secondary to non-compliance with podiatry recommendations (patient reports getting foot wet and bearing weight)  - Defer care to podiatry  -wound cultures growing Enterobacter and Enterococcus  -scheduled for wound closure on Monday 11/5/18  -Infectious Disease following  -will need LTAC vs SNF  -Will continue zyvox and zosyn   11/4/18  -Surgery planned for tomorrow  -ID following  -IV zosyn discontinued   -Continue Zyvox po and IV Rocephin added   11/5/18  -Awaiting surgery for wound closure   11/6/18  -S/p Left foot Secondary Wound Closure and debridement of Bone/Transmetatarsal Amputation by Dr. Wheeler.   -Patient spike fever of 101.4F, IS encouraged   - CXR pending   -Social work consult for discharge planning (SNF vs LTAC). Patient will need IV abx for 6 weeks

## 2018-11-06 NOTE — SUBJECTIVE & OBJECTIVE
Interval History: 63 year old man with diabetic foot.  All previous cultures reviewed   10/31- GNR  09/21- MSSA  He does not want LTAC   11/2- latest wound cultures -enterococcus/enterobacter   He is afebrile   11/3-wound cultures -VRE/ enterobacter   He says he does not feel well     11/4- more wound cultures= stenotrophomonas  VRE and enterobacter   He is afebrile   11/6- All cultures reviewed   Stenotrophomas is levaquin sensitive   Other cultures -VRE and enterobacter   Review of Systems   Constitutional: Negative for chills, diaphoresis, fatigue and fever.   HENT: Negative for hearing loss, mouth sores, sore throat, tinnitus and trouble swallowing.    Eyes: Negative for pain, discharge and redness.   Respiratory: Negative for apnea, cough, choking, chest tightness, shortness of breath, wheezing and stridor.    Cardiovascular: Negative for chest pain, palpitations and leg swelling.   Gastrointestinal: Negative for abdominal distention, abdominal pain, blood in stool, constipation, diarrhea, nausea, rectal pain and vomiting.   Endocrine: Negative for cold intolerance, heat intolerance, polydipsia, polyphagia and polyuria.   Genitourinary: Negative for difficulty urinating, dysuria, flank pain, frequency, hematuria and urgency.   Musculoskeletal: Negative for arthralgias, back pain, gait problem, joint swelling, neck pain and neck stiffness.   Skin: Positive for color change and wound. Negative for rash.        Left foot with worsening erythema.    Allergic/Immunologic: Negative for food allergies.   Neurological: Negative for dizziness, tremors, seizures, syncope, speech difficulty, light-headedness and headaches.   Hematological: Negative for adenopathy. Does not bruise/bleed easily.   Psychiatric/Behavioral: Negative for agitation and confusion. The patient is not nervous/anxious.    All other systems reviewed and are negative.    Objective:     Vital Signs (Most Recent):  Temp: 98.8 °F (37.1 °C) (11/07/18  0712)  Pulse: 76 (11/07/18 0712)  Resp: 16 (11/07/18 0712)  BP: (!) 109/58 (11/07/18 0712)  SpO2: 95 % (11/07/18 0712) Vital Signs (24h Range):  Temp:  [98.1 °F (36.7 °C)-101.4 °F (38.6 °C)] 98.8 °F (37.1 °C)  Pulse:  [70-81] 76  Resp:  [12-18] 16  SpO2:  [89 %-95 %] 95 %  BP: ()/(50-58) 109/58     Weight: 99.8 kg (220 lb 0.3 oz)  Body mass index is 30.69 kg/m².    Estimated Creatinine Clearance: 34.1 mL/min (A) (based on SCr of 2.6 mg/dL (H)).    Physical Exam   Constitutional: He is oriented to person, place, and time. He appears well-developed and well-nourished. No distress.   HENT:   Head: Normocephalic and atraumatic.   Mouth/Throat: Oropharynx is clear and moist.   Eyes: Conjunctivae and EOM are normal. Pupils are equal, round, and reactive to light.   Neck: Normal range of motion. Neck supple. No JVD present.   Cardiovascular: Normal rate, regular rhythm, normal heart sounds and intact distal pulses. Exam reveals no gallop and no friction rub.   No murmur heard.  Pulmonary/Chest: Effort normal and breath sounds normal. No stridor. No respiratory distress. He has no wheezes. He has no rales. He exhibits no tenderness.   Abdominal: Soft. Bowel sounds are normal. He exhibits no distension and no mass. There is no tenderness. There is no rebound and no guarding.   Musculoskeletal: Normal range of motion. He exhibits no edema or tenderness.   Dressing over foot noted   Neurological: He is alert and oriented to person, place, and time. No cranial nerve deficit.   Skin: Skin is warm and dry. No rash noted. He is not diaphoretic. There is erythema.   -   Psychiatric: He has a normal mood and affect. His speech is normal. Judgment and thought content normal. He is withdrawn. Cognition and memory are normal. He is inattentive.   Nursing note and vitals reviewed.      Significant Labs:   Blood Culture:   Recent Labs   Lab 09/18/18  1812 09/21/18  0907 09/21/18  1516 10/31/18  1054 10/31/18  1055   LABBLOO Gram  stain aer bottle: Gram positive cocci in clusters resembling Staph   Gram stain susan bottle: Gram positive cocci in clusters resembling Staph   Results called to and read back by:NOA Eckert RN 09/19/2018  15:31  STAPHYLOCOCCUS AUREUS  ID consult required at [facility] [place] [facility].  For susceptibility see order # [id number]   No growth after 5 days. No growth after 5 days. No growth after 5 days. No growth after 5 days.     BMP:   Recent Labs   Lab 11/07/18  0457   *   *   K 4.1      CO2 23   BUN 31*   CREATININE 2.6*   CALCIUM 8.5*     CBC:   Recent Labs   Lab 11/06/18  0435 11/07/18  0457   WBC 7.15 8.62   HGB 11.0* 9.8*   HCT 35.4* 31.8*    205     CMP:   Recent Labs   Lab 11/06/18  0435 11/07/18  0457    134*   K 4.3 4.1    101   CO2 22* 23   GLU 95 194*   BUN 21 31*   CREATININE 1.8* 2.6*   CALCIUM 9.4 8.5*   ANIONGAP 13 10   EGFRNONAA 39* 25*     Wound Culture:   Recent Labs   Lab 07/09/18  0856 09/21/18  1235 10/18/18  1328 10/31/18  0911 10/31/18  1640   LABAERO CITROBACTER FREUNDII  Rare    KLEBSIELLA OXYTOCA  Rare   STAPHYLOCOCCUS AUREUS  Few  Skin skylar also present   Skin skylar,  no predominant organism ENTEROBACTER CLOACAE  Many    STENOTROPHOMONAS (X.) MALTOPHILIA  Moderate   ENTEROCOCCUS FAECIUM VRE  Rare    ENTEROBACTER CLOACAE  Rare         Significant Imaging: I have reviewed all pertinent imaging results/findings within the past 24 hours.Significant Imaging: I have reviewed all pertinent imaging results/findings within the past 24 hours.

## 2018-11-06 NOTE — PROGRESS NOTES
[facility] Hospital Medicine  Progress Note    Patient Name: Lavelle Ladd  MRN: [id number]  Patient Class: IP- Inpatient   Admission Date: 10/31/2018  Length of Stay: 6 days  Attending Physician: Ruma Rand MD  Primary Care Provider: Rishabh Esteban MD        Subjective:     Principal Problem:S/P transmetatarsal amputation of foot, left    HPI:  Lavelle Ladd is a 63 y.o. male  with a PMHx of COPD, CAD, Diastolic CHF, CKD, Renal transplant, GERD, Hepatitis C, HLD, HTN, PVD, and IDDM who presented to the hospital today as a direct admit for planned surgery today per Dr. Wheeler.  Left foot xray today showed amputation of the distal aspect of the left foot at the level of the metatarsal bones.  No lytic abnormalities to suggest osteomyelitis by plain radiograph.  No evidence of acute fracture or dislocation.  Bony mineralization is normal.  Diffuse soft tissue and dense vascular calcifications.  Large plantar calcaneal spur.  He is s/p left transmetatarsal amputation secondary to gangrene of multiple digits and JOSE R.  Patient underwent delayed primary wound closure, on 9/24/18.  He presented to podiatry today for his 2 week follow-up of wound closure 2/2 to wound dehiscence.  Patient missed his last postoperative visit as he states he was unable to get a ride to his appointment.  He also admits to ambulating on his amputation site.  He drove himself to clinic today as he was unable to get a ride.  He states that he has gotten his foot wet as he has taken showers covering extremity in a garbage bag.  He denies any fever, chills, foot pain, CP, SOB, N/V/D, and all other symptoms at this time.  Patient has a history of poor compliance and difficult living dispo as he lives alone.  In the past we did try to get patient admitted to skilled nursing facility, but he has refused such services.   Wound cx on 9/21 showed MSSA for which he has completed IV cefazolin for 14 days secondary to MSSA  gangrene.  He has been NPO since midnight.  He will be admitted to the Medicine unit under the care of Hospital Medicine.  ID consulted per podiatry recs to guide antibiotic therapy postoperatively.  He is a [employer].  His SDM is his sister Kecia Williamson who can be reached at [phone].                Hospital Course:  11/1/18 The patient is S/P I&D of left foot yesterday. No acute issues overnight. The patient reports that his pain is well controlled. Continue IV ABX, cultures pending. Infectious disease is following. 11/2/18 wound culture growing Enterococcus and Enterobacter. Podiatry recommends SNF vs LTAC placement due to potential for limb loss. 11/3/18- Will continue zyvox and zosyn. Creatinine level at baseline. Surgery planned for Monday by Dr. Wheeler for secondary wound closure. 11/4/18- Patient awake and alert today. Dr. Guthrie at bedside performing wound care to left transmetatarsal amputation stump. Patient tolerated well. Plan for surgery tomorrow. Continue zyvox, IV zosyn discontinued and IV Rocephin added. 11/5/18-Creatinine stable. Blood pressure elevate this morning, lisinopril added per nephrology. Awaiting surgery for wound closure. 11/6/18-  S/p Left foot Secondary Wound Closure and debridement of Bone/Transmetatarsal Amputation by Dr. Wheeler.  Patine spike fever this afternoon. WBCs remain negative. CXR pending. Encourage IS use. Infectious disease on board. Social work consult for discharge planning (SNF vs LTAC). Patient will need IV abx x 6 weeks.              Interval History: S/p s/p Left foot Secondary Wound Closure and debridement of Bone/Transmetatarsal Amputation by Dr. Wheeler, POD #1, pain controlled. Social work consult for discharge planning (SNF vs LTAC). Patine spike fever this afternoon. WBCs remain negative. CXR pending. Encourage IS use. Will follow fever curve.     Review of Systems   Constitutional: Negative for chills, diaphoresis, fatigue and fever.   HENT:  Negative for hearing loss, mouth sores, sore throat, tinnitus and trouble swallowing.    Eyes: Negative for pain, discharge and redness.   Respiratory: Negative for apnea, cough, choking, chest tightness, shortness of breath, wheezing and stridor.    Cardiovascular: Negative for chest pain, palpitations and leg swelling.   Gastrointestinal: Negative for abdominal distention, abdominal pain, blood in stool, constipation, diarrhea, nausea, rectal pain and vomiting.   Endocrine: Negative for cold intolerance, heat intolerance, polydipsia, polyphagia and polyuria.   Genitourinary: Negative for difficulty urinating, dysuria, flank pain, frequency, hematuria and urgency.   Musculoskeletal: Negative for arthralgias, back pain, gait problem, joint swelling, neck pain and neck stiffness.   Skin: Positive for color change and wound. Negative for rash.   Allergic/Immunologic: Negative for food allergies.   Neurological: Negative for dizziness, tremors, seizures, syncope, speech difficulty, light-headedness and headaches.   Hematological: Negative for adenopathy. Does not bruise/bleed easily.   Psychiatric/Behavioral: Negative for agitation and confusion. The patient is not nervous/anxious.    All other systems reviewed and are negative.    Objective:     Vital Signs (Most Recent):  Temp: (!) 101.4 °F (38.6 °C) (11/06/18 1347)  Pulse: 81 (11/06/18 1251)  Resp: 18 (11/06/18 1251)  BP: (!) 108/55 (11/06/18 1251)  SpO2: (!) 91 % (11/06/18 1251) Vital Signs (24h Range):  Temp:  [97.7 °F (36.5 °C)-101.4 °F (38.6 °C)] 101.4 °F (38.6 °C)  Pulse:  [60-81] 81  Resp:  [16-18] 18  SpO2:  [90 %-99 %] 91 %  BP: ()/(52-74) 108/55     Weight: 99.8 kg (220 lb 0.3 oz)  Body mass index is 30.69 kg/m².    Intake/Output Summary (Last 24 hours) at 11/6/2018 1514  Last data filed at 11/6/2018 0444  Gross per 24 hour   Intake 645.83 ml   Output --   Net 645.83 ml      Physical Exam   Constitutional: He is oriented to person, place, and time. He  appears well-developed and well-nourished. No distress.   HENT:   Head: Normocephalic and atraumatic.   Mouth/Throat: Oropharynx is clear and moist.   Eyes: Conjunctivae and EOM are normal. Pupils are equal, round, and reactive to light.   Neck: Normal range of motion. Neck supple. No JVD present.   Cardiovascular: Normal rate, regular rhythm, normal heart sounds and intact distal pulses. Exam reveals no gallop and no friction rub.   No murmur heard.  Pulmonary/Chest: Effort normal and breath sounds normal. No stridor. No respiratory distress. He has no wheezes. He has no rales. He exhibits no tenderness.   Abdominal: Soft. Bowel sounds are normal. He exhibits no distension and no mass. There is no tenderness. There is no rebound and no guarding.   Musculoskeletal: Normal range of motion. He exhibits no edema or tenderness.   Neurological: He is alert and oriented to person, place, and time. No cranial nerve deficit.   Skin: Skin is warm and dry. No rash noted. He is not diaphoretic. No erythema.   LLE dressing CDI.    Psychiatric: He has a normal mood and affect. His speech is normal and behavior is normal. Judgment and thought content normal. Cognition and memory are normal.   Nursing note and vitals reviewed.      Significant Labs:   BMP:   Recent Labs   Lab 11/06/18  0435   GLU 95      K 4.3      CO2 22*   BUN 21   CREATININE 1.8*   CALCIUM 9.4     CBC:   Recent Labs   Lab 11/05/18  0508 11/06/18  0435   WBC 5.26 7.15   HGB 11.1* 11.0*   HCT 35.3* 35.4*    249       Significant Imaging:      Assessment/Plan:      * S/P transmetatarsal amputation of foot, left    - with delayed wound healing secondary to non-compliance with podiatry recommendations (patient reports getting foot wet and bearing weight)  - Defer care to podiatry  -wound cultures growing Enterobacter and Enterococcus  -scheduled for wound closure on Monday 11/5/18  -Infectious Disease following  -will need LTAC vs SNF  -Will  continue zyvox and zosyn   11/4/18  -Surgery planned for tomorrow  -ID following  -IV zosyn discontinued   -Continue Zyvox po and IV Rocephin added   11/5/18  -Awaiting surgery for wound closure   11/6/18  -S/p Left foot Secondary Wound Closure and debridement of Bone/Transmetatarsal Amputation by Dr. Wheeler.   -Patient spike fever of 101.4F, IS encouraged   - CXR pending   -Social work consult for discharge planning (SNF vs LTAC). Patient will need IV abx for 6 weeks      Immunosuppression    Hx of kidney transplant   Continue prednisone and Prograf per Nephrology  Prograf level 5.8       Hx of right BKA    - Site is intact, well healed   -No s/s of infection          Non compliance with medical treatment    - Educated on the importance of adhering to medical treatment plan  - Patient has a limited support system  - Social work consult for DC planning (SNF vs LTAC)       Abscess of left foot    See plan for s/p transmetatarsal amputation of foot  Wound cultures grew VRE and enterobacter  ID following   Zyvox po and IV Rocephin .  Will follow anaerobic cultures  Bactrim added for stenotrophomonas by Dr. Veliz   Continue current treatment      Peripheral vascular disease    - Resume home ASA   - Vascular f/u as needed       Chronic bilateral low back pain with left-sided sciatica    - Resume home Norco PRN  - Monitor  -Pain controlled  -Stable      Kidney transplant recipient    - patient is noted to be a poor historian and does not know what medications he takes at home  - per d/w Edmundo Hernandez today, he is currently prescribed Prednisone 5 mg daily, Cellcept 500 mg BID, and Prograf 1.5 mg BID  - Nephrology consulted  - Continue home Prednisone and Prograf for now  - Hold Cellcept per Dr. Staley  - Monitor Prograf levels   - Followed by Dr. Blanco in [place]   -Continue current medical management plan   -Nephrology following closely         Stage 3 chronic kidney disease    -Creatinine 1.8 today. Slight  increase. Closely monitor  - Daily BMP  - Monitor  -Creatinine 1.5 at baseline   -Creatinine stable  -Nephrology following   -Remains stable   -No change       Chronic obstructive pulmonary disease    - Currently appears compensated  - Continue home meds  - Supplemental O2 PRN  - Monitor  -No change      Coronary artery disease involving native coronary artery of native heart without angina pectoris    -Continue Cardiac medications  -Cardiac monitoring  -Stable        Type 2 diabetes mellitus with hyperglycemia, with long-term current use of insulin    - A1c 8.0 in September  -Glucose slightly uncontrolled  - Accu checks ACHS with SSI PRN  - add NPH at lower doses  - Monitor  -SSI dose increased to moderate   -Continue to monitor adjust insulin accordingly        Essential hypertension    - BP well controlled  - Continue home Norvasc and Metoprolol  - Monitor  -Blood pressure needing better control, bidil added   -Lisinopril added   -BP stabilized        VTE Risk Mitigation (From admission, onward)        Ordered     IP VTE HIGH RISK PATIENT  Once      11/05/18 1555     Place sequential compression device  Until discontinued      11/05/18 1555     Place sequential compression device  Until discontinued      10/31/18 1104              Carla Irby NP  Department of Hospital Medicine   [facility]

## 2018-11-06 NOTE — NURSING
Pt complaining of worsening cough since sx, tried Chloraseptic but not improving. Pt also vomiting after sx. Administered Phenergan IV as ordered-vomiting resolved. BP currently 93/52 and pt is not receiving IV fluids. Sent message to [facility] [place] for orders.

## 2018-11-06 NOTE — ASSESSMENT & PLAN NOTE
62 y/o male with a h/o of KTx has wound infection after amputation:                  Kidney transplant recipient     CKD stage 3. No change in s Cr, stable.  Stable renal function, no change, no worsening  K normal  Metabolic acidosis, mild, stable, improved  Hyponatremia, mild, improved, Na normal now     H/o of cadaveric kidney transplant in May 2016  Doing well, stable hemodynamically  On immunosuppressive therapy  Last prograf level was within the therapeutic range  Will repeat prograf level in am  No change in dose of prograf, continue same dose for now  Cellcept is on hold, keep on hold, due to current and active infection     HTN : BP was controlled yesterday  Elevated today  Meds reviewed  Will add lisinopril 10 mg po qd     H/o of DM  Diabetic nephropathy               * Abscess of left foot     Left foot wound infection post mid foot amputation  Wound cx growing Enterococcus and Enterobacter  Abx reviewed   Continue zosyn and zyvox   Pt going for further surgery and wound closure today            Plans and recommendations:  As discussed above  Will f/u closely.

## 2018-11-06 NOTE — PROGRESS NOTES
"[facility]  Nephrology  Progress Note    Patient Name: Lavelle Ladd  MRN: [id number]  Admission Date: 10/31/2018  Hospital Length of Stay: 6 days  Attending Provider: Ruma Rand MD   Primary Care Physician: Rishabh Esteban MD  Principal Problem:S/P transmetatarsal amputation of foot, left    Subjective:     HPI: Lavelle Ladd is a pleasant 63 y old  man  , s/p   donor ( brain death ) kidney transplant on 16.  He has CKD stage 3 - GFR 30-59 and his baseline creatinine is between 1.5-1.8. He takes  prednisone 5 mg daily and tacrolimus 1.5 mg twice a day , for maintenance immunosuppression.  He he is also on CellCept 500 mg twice a day, will hold this medication due to cellulitis of his left foot, he is status post left transmetatarsal amputation on 2018, patient was seen for follow-up appointment in the clinic yesterday and notice that his foot was infected, patient was admitted to the hospital for further management,      Interval History: Pt was seen this am. No new c/o's, except feels "warm", no new events.    Review of patient's allergies indicates:  No Known Allergies  Current Facility-Administered Medications   Medication Frequency    amLODIPine tablet 10 mg Daily    arformoterol nebulizer solution 15 mcg Q12H    aspirin EC tablet 81 mg Daily    benzonatate capsule 100 mg TID PRN    budesonide nebulizer solution 0.5 mg Q12H    cefTRIAXone (ROCEPHIN) 1 g in dextrose 5 % 50 mL IVPB Q24H    dextrose 50% injection 12.5 g PRN    dextrose 50% injection 25 g PRN    fentaNYL injection 25 mcg Q5 Min PRN    gabapentin capsule 300 mg TID    glucagon (human recombinant) injection 1 mg PRN    glucose chewable tablet 16 g PRN    glucose chewable tablet 24 g PRN    HYDROcodone-acetaminophen  mg per tablet 1 tablet Q4H PRN    insulin aspart U-100 pen 1-10 Units QID (AC + HS) PRN    insulin NPH injection 20 Units BID AC    isosorbide-hydrALAZINE " 20-37.5 mg per tablet 1 tablet TID    linezolid tablet 600 mg Q12H    lisinopril tablet 10 mg Daily    meperidine injection 12.5 mg Once    metoclopramide HCl injection 10 mg Q10 Min PRN    metoprolol tartrate (LOPRESSOR) tablet 25 mg BID    morphine injection 2 mg Q5 Min PRN    nozaseptin (NOZIN) nasal  BID    ondansetron disintegrating tablet 8 mg Q8H PRN    ondansetron injection 4 mg Q8H PRN    oxyCODONE immediate release tablet 5 mg Q3H PRN    predniSONE tablet 5 mg Daily    promethazine (PHENERGAN) 6.25 mg in dextrose 5 % 50 mL IVPB Q6H PRN    sodium bicarbonate tablet 2,600 mg BID    sodium chloride 0.9% flush 3 mL Q8H    sodium chloride 0.9% flush 3 mL PRN    sodium chloride 0.9% flush 3 mL PRN    sodium chloride 0.9% flush 3 mL PRN    sodium chloride 0.9% flush 5 mL PRN    sulfamethoxazole-trimethoprim 800-160mg per tablet 1 tablet BID    tacrolimus capsule 1.5 mg BID       Objective:     Vital Signs (Most Recent):  Temp: 99.3 °F (37.4 °C) (11/06/18 0704)  Pulse: 76 (11/06/18 1027)  Resp: 18 (11/06/18 0745)  BP: 131/63 (11/06/18 0704)  SpO2: (!) 93 % (11/06/18 0745)  O2 Device (Oxygen Therapy): room air (11/06/18 0745) Vital Signs (24h Range):  Temp:  [97.6 °F (36.4 °C)-99.3 °F (37.4 °C)] 99.3 °F (37.4 °C)  Pulse:  [60-81] 76  Resp:  [16-18] 18  SpO2:  [90 %-100 %] 93 %  BP: ()/(52-78) 131/63     Weight: 99.8 kg (220 lb 0.3 oz) (10/31/18 1049)  Body mass index is 30.69 kg/m².  Body surface area is 2.24 meters squared.    I/O last 3 completed shifts:  In: 1525.8 [P.O.:480; I.V.:945.8; IV Piggyback:100]  Out: 1275 [Urine:1250; Blood:25]    Physical Exam   Constitutional: He is oriented to person, place, and time. He appears well-developed and well-nourished.   HENT:   Head: Normocephalic and atraumatic.   Neck: No JVD present.   Cardiovascular: Normal rate, regular rhythm and normal heart sounds.   Pulmonary/Chest: No respiratory distress. He has no rales.   Abdominal: Soft.  He exhibits no distension.   Musculoskeletal: He exhibits no edema.   Neurological: He is oriented to person, place, and time.   Skin: Skin is dry.   Psychiatric: He has a normal mood and affect. His behavior is normal.   Nursing note and vitals reviewed.      Significant Labs: reviewed  BMP  Lab Results   Component Value Date     11/06/2018    K 4.3 11/06/2018     11/06/2018    CO2 22 (L) 11/06/2018    BUN 21 11/06/2018    CREATININE 1.8 (H) 11/06/2018    CALCIUM 9.4 11/06/2018    ANIONGAP 13 11/06/2018    ESTGFRAFRICA 45 (A) 11/06/2018    EGFRNONAA 39 (A) 11/06/2018     Lab Results   Component Value Date    WBC 7.15 11/06/2018    HGB 11.0 (L) 11/06/2018    HCT 35.4 (L) 11/06/2018    MCV 92 11/06/2018     11/06/2018         Assessment/Plan:         62 y/o male with a h/o of KTx has wound infection after amputation:                  Kidney transplant recipient     CKD stage 3. Noted s Cr a little higher today  Will monitor  Stable renal function overall  K normal  Metabolic acidosis, mild, stable, improved  Hyponatremia, corrected, Na normal now     H/o of cadaveric kidney transplant in May 2016  Doing well, stable hemodynamically  On immunosuppressive therapy  Last prograf level was within the therapeutic range  Repeat prograf level pending  No change in dose of prograf, continue same dose for now  Cellcept is on hold, keep on hold, due to current and active infection     HTN : BP was controlled after adding lisinopril yesterday  Meds reviewed  Will monitor     H/o of DM  Diabetic nephropathy               * Abscess of left foot     Left foot wound infection post mid foot amputation  Wound cx growing Enterococcus and Enterobacter  Has VRE  Contact precaution  Abx reviewed   Continue zosyn and zyvox   S/p surgery and wound closure yesterday            Plans and recommendations:  As discussed above  Will f/u closely.               Maureen Cherry MD  Nephrology  [facility]

## 2018-11-06 NOTE — ASSESSMENT & PLAN NOTE
- Educated on the importance of adhering to medical treatment plan  - Patient has a limited support system  - Social work consult for DC planning (SNF vs LTAC)

## 2018-11-06 NOTE — PROGRESS NOTES
Patient instructed on incentive spirometer and correct use. Patient states he knows he has 4 at home.

## 2018-11-06 NOTE — ASSESSMENT & PLAN NOTE
-Creatinine 1.8 today. Slight increase. Closely monitor  - Daily BMP  - Monitor  -Creatinine 1.5 at baseline   -Creatinine stable  -Nephrology following   -Remains stable   -No change

## 2018-11-06 NOTE — ASSESSMENT & PLAN NOTE
See plan for s/p transmetatarsal amputation of foot  Wound cultures grew VRE and enterobacter  ID following   Zyvox po and IV Rocephin .  Will follow anaerobic cultures  Bactrim added for stenotrophomonas by Dr. Veliz   Continue current treatment

## 2018-11-06 NOTE — PLAN OF CARE
Problem: Patient Care Overview  Goal: Plan of Care Review  Outcome: Ongoing (interventions implemented as appropriate)  Pt had no adverse events during shift. Pt free of falls. Call light in reach. Side rails x 2. Pain controlled w/ PRN PO meds. Pt complains of nausea and vomiting during shift treated w/ IV Phenergan as ordered. Pt experienced low BP during shift (see prev note). LLE elevated during shift. Dressing maintained CDI. NSR on tele monitor. Blood glucose monitored, supplemental insulin given as needed. Contact precautions maintained. VSS. Chart reviewed, will continue to monitor.

## 2018-11-06 NOTE — PLAN OF CARE
CM spoke to patient regarding plan for discharge.  He is agreeable to SNF placement.  CM spoke to patient to obtain preference.  Preference is for [facility].  Choice form completed and witnessed.  Referral made via [facility] care.

## 2018-11-06 NOTE — ASSESSMENT & PLAN NOTE
- patient is noted to be a poor historian and does not know what medications he takes at home  - per d/w Edmundo Hernandez today, he is currently prescribed Prednisone 5 mg daily, Cellcept 500 mg BID, and Prograf 1.5 mg BID  - Nephrology consulted  - Continue home Prednisone and Prograf for now  - Hold Cellcept per Dr. Staley  - Monitor Prograf levels   - Followed by Dr. Blanco in [place]   -Continue current medical management plan   -Nephrology following closely

## 2018-11-06 NOTE — PLAN OF CARE
Problem: Patient Care Overview  Goal: Plan of Care Review  Outcome: Ongoing (interventions implemented as appropriate)   11/06/18 1321   Coping/Psychosocial   Plan Of Care Reviewed With patient   Patient with no complaints of pain.  Chart check reviewed. Will continue to monitor,    Problem: Diabetes, Type 2 (Adult)  Intervention: Support/Optimize Psychosocial Response to Condition   11/06/18 1321   Coping/Psychosocial Interventions   Supportive Measures active listening utilized;counseling provided;decision-making supported;goal setting facilitated;positive reinforcement provided;problem solving facilitated         Problem: Infection, Risk/Actual (Adult)  Intervention: Manage Suspected/Actual Infection   11/06/18 1321   Safety Interventions   Isolation Precautions contact precautions maintained   Prevent/Manage Colorectal Surgical Infection   Fever Reduction/Comfort Measures fluid intake increased         Problem: Fall Risk (Adult)  Intervention: Review Medications/Identify Contributors to Fall Risk   11/06/18 1321   Safety Interventions   Medication Review/Management medications reviewed;high risk medications identified;dosing adjusted     Intervention: Patient Rounds   11/06/18 1321   Safety Interventions   Patient Rounds bed in low position;clutter free environment maintained;bed wheels locked;call light in reach;ID band on         Problem: Pressure Ulcer Risk (Matthew Scale) (Adult,Obstetrics,Pediatric)  Intervention: Maintain Head of Bed Elevation Less Than 30 Degrees as Tolerated   11/06/18 1321   Positioning   Head of Bed (HOB) HOB at 30-45 degrees         Problem: Pain, Acute (Adult)  Intervention: Monitor/Manage Analgesia   11/06/18 1321   Safety Interventions   Medication Review/Management medications reviewed;high risk medications identified;dosing adjusted

## 2018-11-06 NOTE — SUBJECTIVE & OBJECTIVE
"Interval History: Pt was seen this am. No new c/o's, except feels "warm", no new events.    Review of patient's allergies indicates:  No Known Allergies  Current Facility-Administered Medications   Medication Frequency    amLODIPine tablet 10 mg Daily    arformoterol nebulizer solution 15 mcg Q12H    aspirin EC tablet 81 mg Daily    benzonatate capsule 100 mg TID PRN    budesonide nebulizer solution 0.5 mg Q12H    cefTRIAXone (ROCEPHIN) 1 g in dextrose 5 % 50 mL IVPB Q24H    dextrose 50% injection 12.5 g PRN    dextrose 50% injection 25 g PRN    fentaNYL injection 25 mcg Q5 Min PRN    gabapentin capsule 300 mg TID    glucagon (human recombinant) injection 1 mg PRN    glucose chewable tablet 16 g PRN    glucose chewable tablet 24 g PRN    HYDROcodone-acetaminophen  mg per tablet 1 tablet Q4H PRN    insulin aspart U-100 pen 1-10 Units QID (AC + HS) PRN    insulin NPH injection 20 Units BID AC    isosorbide-hydrALAZINE 20-37.5 mg per tablet 1 tablet TID    linezolid tablet 600 mg Q12H    lisinopril tablet 10 mg Daily    meperidine injection 12.5 mg Once    metoclopramide HCl injection 10 mg Q10 Min PRN    metoprolol tartrate (LOPRESSOR) tablet 25 mg BID    morphine injection 2 mg Q5 Min PRN    nozaseptin (NOZIN) nasal  BID    ondansetron disintegrating tablet 8 mg Q8H PRN    ondansetron injection 4 mg Q8H PRN    oxyCODONE immediate release tablet 5 mg Q3H PRN    predniSONE tablet 5 mg Daily    promethazine (PHENERGAN) 6.25 mg in dextrose 5 % 50 mL IVPB Q6H PRN    sodium bicarbonate tablet 2,600 mg BID    sodium chloride 0.9% flush 3 mL Q8H    sodium chloride 0.9% flush 3 mL PRN    sodium chloride 0.9% flush 3 mL PRN    sodium chloride 0.9% flush 3 mL PRN    sodium chloride 0.9% flush 5 mL PRN    sulfamethoxazole-trimethoprim 800-160mg per tablet 1 tablet BID    tacrolimus capsule 1.5 mg BID       Objective:     Vital Signs (Most Recent):  Temp: 99.3 °F (37.4 °C) " (11/06/18 0704)  Pulse: 76 (11/06/18 1027)  Resp: 18 (11/06/18 0745)  BP: 131/63 (11/06/18 0704)  SpO2: (!) 93 % (11/06/18 0745)  O2 Device (Oxygen Therapy): room air (11/06/18 0745) Vital Signs (24h Range):  Temp:  [97.6 °F (36.4 °C)-99.3 °F (37.4 °C)] 99.3 °F (37.4 °C)  Pulse:  [60-81] 76  Resp:  [16-18] 18  SpO2:  [90 %-100 %] 93 %  BP: ()/(52-78) 131/63     Weight: 99.8 kg (220 lb 0.3 oz) (10/31/18 1049)  Body mass index is 30.69 kg/m².  Body surface area is 2.24 meters squared.    I/O last 3 completed shifts:  In: 1525.8 [P.O.:480; I.V.:945.8; IV Piggyback:100]  Out: 1275 [Urine:1250; Blood:25]    Physical Exam   Constitutional: He is oriented to person, place, and time. He appears well-developed and well-nourished.   HENT:   Head: Normocephalic and atraumatic.   Neck: No JVD present.   Cardiovascular: Normal rate, regular rhythm and normal heart sounds.   Pulmonary/Chest: No respiratory distress. He has no rales.   Abdominal: Soft. He exhibits no distension.   Musculoskeletal: He exhibits no edema.   Neurological: He is oriented to person, place, and time.   Skin: Skin is dry.   Psychiatric: He has a normal mood and affect. His behavior is normal.   Nursing note and vitals reviewed.      Significant Labs: reviewed  BMP  Lab Results   Component Value Date     11/06/2018    K 4.3 11/06/2018     11/06/2018    CO2 22 (L) 11/06/2018    BUN 21 11/06/2018    CREATININE 1.8 (H) 11/06/2018    CALCIUM 9.4 11/06/2018    ANIONGAP 13 11/06/2018    ESTGFRAFRICA 45 (A) 11/06/2018    EGFRNONAA 39 (A) 11/06/2018     Lab Results   Component Value Date    WBC 7.15 11/06/2018    HGB 11.0 (L) 11/06/2018    HCT 35.4 (L) 11/06/2018    MCV 92 11/06/2018     11/06/2018

## 2018-11-06 NOTE — NURSING
Carla Irby, NP with hospital medicine notified of patients blood pressure 94/58, map 69 Bidil held. NP notified of patient being more lethargic but easily arousable. NP discontinued all narcotics for now will continue to monitor the patient.

## 2018-11-06 NOTE — SUBJECTIVE & OBJECTIVE
Interval History: S/p s/p Left foot Secondary Wound Closure and debridement of Bone/Transmetatarsal Amputation by Dr. Wheeler, POD #1, pain controlled. Social work consult for discharge planning (SNF vs LTAC). Patine spike fever this afternoon. WBCs remain negative. CXR pending. Encourage IS use. Will follow fever curve.     Review of Systems   Constitutional: Negative for chills, diaphoresis, fatigue and fever.   HENT: Negative for hearing loss, mouth sores, sore throat, tinnitus and trouble swallowing.    Eyes: Negative for pain, discharge and redness.   Respiratory: Negative for apnea, cough, choking, chest tightness, shortness of breath, wheezing and stridor.    Cardiovascular: Negative for chest pain, palpitations and leg swelling.   Gastrointestinal: Negative for abdominal distention, abdominal pain, blood in stool, constipation, diarrhea, nausea, rectal pain and vomiting.   Endocrine: Negative for cold intolerance, heat intolerance, polydipsia, polyphagia and polyuria.   Genitourinary: Negative for difficulty urinating, dysuria, flank pain, frequency, hematuria and urgency.   Musculoskeletal: Negative for arthralgias, back pain, gait problem, joint swelling, neck pain and neck stiffness.   Skin: Positive for color change and wound. Negative for rash.   Allergic/Immunologic: Negative for food allergies.   Neurological: Negative for dizziness, tremors, seizures, syncope, speech difficulty, light-headedness and headaches.   Hematological: Negative for adenopathy. Does not bruise/bleed easily.   Psychiatric/Behavioral: Negative for agitation and confusion. The patient is not nervous/anxious.    All other systems reviewed and are negative.    Objective:     Vital Signs (Most Recent):  Temp: (!) 101.4 °F (38.6 °C) (11/06/18 1347)  Pulse: 81 (11/06/18 1251)  Resp: 18 (11/06/18 1251)  BP: (!) 108/55 (11/06/18 1251)  SpO2: (!) 91 % (11/06/18 1251) Vital Signs (24h Range):  Temp:  [97.7 °F (36.5 °C)-101.4 °F (38.6  °C)] 101.4 °F (38.6 °C)  Pulse:  [60-81] 81  Resp:  [16-18] 18  SpO2:  [90 %-99 %] 91 %  BP: ()/(52-74) 108/55     Weight: 99.8 kg (220 lb 0.3 oz)  Body mass index is 30.69 kg/m².    Intake/Output Summary (Last 24 hours) at 11/6/2018 1514  Last data filed at 11/6/2018 0444  Gross per 24 hour   Intake 645.83 ml   Output --   Net 645.83 ml      Physical Exam   Constitutional: He is oriented to person, place, and time. He appears well-developed and well-nourished. No distress.   HENT:   Head: Normocephalic and atraumatic.   Mouth/Throat: Oropharynx is clear and moist.   Eyes: Conjunctivae and EOM are normal. Pupils are equal, round, and reactive to light.   Neck: Normal range of motion. Neck supple. No JVD present.   Cardiovascular: Normal rate, regular rhythm, normal heart sounds and intact distal pulses. Exam reveals no gallop and no friction rub.   No murmur heard.  Pulmonary/Chest: Effort normal and breath sounds normal. No stridor. No respiratory distress. He has no wheezes. He has no rales. He exhibits no tenderness.   Abdominal: Soft. Bowel sounds are normal. He exhibits no distension and no mass. There is no tenderness. There is no rebound and no guarding.   Musculoskeletal: Normal range of motion. He exhibits no edema or tenderness.   Neurological: He is alert and oriented to person, place, and time. No cranial nerve deficit.   Skin: Skin is warm and dry. No rash noted. He is not diaphoretic. No erythema.   LLE dressing CDI.    Psychiatric: He has a normal mood and affect. His speech is normal and behavior is normal. Judgment and thought content normal. Cognition and memory are normal.   Nursing note and vitals reviewed.      Significant Labs:   BMP:   Recent Labs   Lab 11/06/18  0435   GLU 95      K 4.3      CO2 22*   BUN 21   CREATININE 1.8*   CALCIUM 9.4     CBC:   Recent Labs   Lab 11/05/18  0508 11/06/18  0435   WBC 5.26 7.15   HGB 11.1* 11.0*   HCT 35.3* 35.4*    115        Significant Imaging:

## 2018-11-06 NOTE — ASSESSMENT & PLAN NOTE
- BP well controlled  - Continue home Norvasc and Metoprolol  - Monitor  -Blood pressure needing better control, bidil added   -Lisinopril added   -BP stabilized

## 2018-11-07 LAB
ANION GAP SERPL CALC-SCNC: 10 MMOL/L
BASOPHILS # BLD AUTO: 0.01 K/UL
BASOPHILS NFR BLD: 0.1 %
BUN SERPL-MCNC: 31 MG/DL
CALCIUM SERPL-MCNC: 8.5 MG/DL
CHLORIDE SERPL-SCNC: 101 MMOL/L
CO2 SERPL-SCNC: 23 MMOL/L
CREAT SERPL-MCNC: 2.6 MG/DL
CREAT UR-MCNC: 140 MG/DL
DIFFERENTIAL METHOD: ABNORMAL
EOSINOPHIL # BLD AUTO: 0.1 K/UL
EOSINOPHIL NFR BLD: 0.9 %
ERYTHROCYTE [DISTWIDTH] IN BLOOD BY AUTOMATED COUNT: 16.7 %
EST. GFR  (AFRICAN AMERICAN): 29 ML/MIN/1.73 M^2
EST. GFR  (NON AFRICAN AMERICAN): 25 ML/MIN/1.73 M^2
GLUCOSE SERPL-MCNC: 194 MG/DL
HCT VFR BLD AUTO: 31.8 %
HGB BLD-MCNC: 9.8 G/DL
LYMPHOCYTES # BLD AUTO: 2.3 K/UL
LYMPHOCYTES NFR BLD: 26.7 %
MCH RBC QN AUTO: 28.2 PG
MCHC RBC AUTO-ENTMCNC: 30.8 G/DL
MCV RBC AUTO: 92 FL
MONOCYTES # BLD AUTO: 0.6 K/UL
MONOCYTES NFR BLD: 6.4 %
NEUTROPHILS # BLD AUTO: 5.7 K/UL
NEUTROPHILS NFR BLD: 65.9 %
PLATELET # BLD AUTO: 205 K/UL
PMV BLD AUTO: 9.5 FL
POCT GLUCOSE: 206 MG/DL (ref 70–110)
POCT GLUCOSE: 208 MG/DL (ref 70–110)
POCT GLUCOSE: 215 MG/DL (ref 70–110)
POTASSIUM SERPL-SCNC: 4.1 MMOL/L
RBC # BLD AUTO: 3.47 M/UL
SODIUM SERPL-SCNC: 134 MMOL/L
SODIUM UR-SCNC: 68 MMOL/L
TACROLIMUS BLD-MCNC: 7 NG/ML
WBC # BLD AUTO: 8.62 K/UL

## 2018-11-07 PROCEDURE — 25000003 PHARM REV CODE 250: Mod: HCNC | Performed by: NURSE PRACTITIONER

## 2018-11-07 PROCEDURE — 82570 ASSAY OF URINE CREATININE: CPT | Mod: HCNC

## 2018-11-07 PROCEDURE — 80048 BASIC METABOLIC PNL TOTAL CA: CPT | Mod: HCNC

## 2018-11-07 PROCEDURE — 27000221 HC OXYGEN, UP TO 24 HOURS: Mod: HCNC

## 2018-11-07 PROCEDURE — 25000003 PHARM REV CODE 250: Mod: HCNC | Performed by: PODIATRIST

## 2018-11-07 PROCEDURE — 94760 N-INVAS EAR/PLS OXIMETRY 1: CPT | Mod: HCNC

## 2018-11-07 PROCEDURE — 21400001 HC TELEMETRY ROOM: Mod: HCNC

## 2018-11-07 PROCEDURE — 96372 THER/PROPH/DIAG INJ SC/IM: CPT | Mod: HCNC

## 2018-11-07 PROCEDURE — 25000003 PHARM REV CODE 250: Mod: HCNC | Performed by: ANESTHESIOLOGY

## 2018-11-07 PROCEDURE — 94640 AIRWAY INHALATION TREATMENT: CPT | Mod: HCNC

## 2018-11-07 PROCEDURE — 99900035 HC TECH TIME PER 15 MIN (STAT): Mod: HCNC

## 2018-11-07 PROCEDURE — 36415 COLL VENOUS BLD VENIPUNCTURE: CPT | Mod: HCNC

## 2018-11-07 PROCEDURE — 63600175 PHARM REV CODE 636 W HCPCS: Mod: HCNC | Performed by: INTERNAL MEDICINE

## 2018-11-07 PROCEDURE — 80197 ASSAY OF TACROLIMUS: CPT | Mod: HCNC

## 2018-11-07 PROCEDURE — A4216 STERILE WATER/SALINE, 10 ML: HCPCS | Mod: HCNC | Performed by: ANESTHESIOLOGY

## 2018-11-07 PROCEDURE — 25000003 PHARM REV CODE 250: Mod: HCNC | Performed by: INTERNAL MEDICINE

## 2018-11-07 PROCEDURE — 25000003 PHARM REV CODE 250: Mod: HCNC | Performed by: FAMILY MEDICINE

## 2018-11-07 PROCEDURE — 25000242 PHARM REV CODE 250 ALT 637 W/ HCPCS: Mod: HCNC | Performed by: PODIATRIST

## 2018-11-07 PROCEDURE — 85025 COMPLETE CBC W/AUTO DIFF WBC: CPT | Mod: HCNC

## 2018-11-07 PROCEDURE — 87040 BLOOD CULTURE FOR BACTERIA: CPT | Mod: 59,HCNC

## 2018-11-07 PROCEDURE — 84300 ASSAY OF URINE SODIUM: CPT | Mod: HCNC

## 2018-11-07 PROCEDURE — 99233 SBSQ HOSP IP/OBS HIGH 50: CPT | Mod: HCNC,,, | Performed by: INTERNAL MEDICINE

## 2018-11-07 PROCEDURE — 63600175 PHARM REV CODE 636 W HCPCS: Mod: HCNC | Performed by: NURSE PRACTITIONER

## 2018-11-07 RX ORDER — SODIUM CHLORIDE 9 MG/ML
INJECTION, SOLUTION INTRAVENOUS CONTINUOUS
Status: DISCONTINUED | OUTPATIENT
Start: 2018-11-07 | End: 2018-11-13 | Stop reason: HOSPADM

## 2018-11-07 RX ORDER — LISINOPRIL 2.5 MG/1
2.5 TABLET ORAL DAILY
Status: DISCONTINUED | OUTPATIENT
Start: 2018-11-07 | End: 2018-11-07

## 2018-11-07 RX ORDER — NALOXONE HCL 0.4 MG/ML
0.4 VIAL (ML) INJECTION ONCE
Status: DISCONTINUED | OUTPATIENT
Start: 2018-11-07 | End: 2018-11-07

## 2018-11-07 RX ORDER — AMLODIPINE BESYLATE 2.5 MG/1
2.5 TABLET ORAL DAILY
Status: DISCONTINUED | OUTPATIENT
Start: 2018-11-08 | End: 2018-11-09

## 2018-11-07 RX ORDER — AMLODIPINE BESYLATE 5 MG/1
5 TABLET ORAL DAILY
Status: DISCONTINUED | OUTPATIENT
Start: 2018-11-07 | End: 2018-11-07

## 2018-11-07 RX ORDER — LEVOFLOXACIN 750 MG/1
750 TABLET ORAL EVERY OTHER DAY
Status: DISCONTINUED | OUTPATIENT
Start: 2018-11-07 | End: 2018-11-10

## 2018-11-07 RX ORDER — LEVOFLOXACIN 500 MG/1
500 TABLET, FILM COATED ORAL DAILY
Status: DISCONTINUED | OUTPATIENT
Start: 2018-11-07 | End: 2018-11-07

## 2018-11-07 RX ADMIN — SODIUM CHLORIDE: 0.9 INJECTION, SOLUTION INTRAVENOUS at 09:11

## 2018-11-07 RX ADMIN — ARFORMOTEROL TARTRATE 15 MCG: 15 SOLUTION RESPIRATORY (INHALATION) at 07:11

## 2018-11-07 RX ADMIN — ACETAMINOPHEN 650 MG: 325 TABLET ORAL at 03:11

## 2018-11-07 RX ADMIN — BUDESONIDE 0.5 MG: 0.5 SUSPENSION RESPIRATORY (INHALATION) at 08:11

## 2018-11-07 RX ADMIN — INSULIN ASPART 4 UNITS: 100 INJECTION, SOLUTION INTRAVENOUS; SUBCUTANEOUS at 04:11

## 2018-11-07 RX ADMIN — HUMAN INSULIN 20 UNITS: 100 INJECTION, SUSPENSION SUBCUTANEOUS at 05:11

## 2018-11-07 RX ADMIN — SODIUM CHLORIDE 500 ML: 0.9 INJECTION, SOLUTION INTRAVENOUS at 01:11

## 2018-11-07 RX ADMIN — TACROLIMUS 1.5 MG: 0.5 CAPSULE ORAL at 09:11

## 2018-11-07 RX ADMIN — ASPIRIN 81 MG: 81 TABLET, COATED ORAL at 09:11

## 2018-11-07 RX ADMIN — LINEZOLID 600 MG: 600 TABLET, FILM COATED ORAL at 09:11

## 2018-11-07 RX ADMIN — Medication 3 ML: at 05:11

## 2018-11-07 RX ADMIN — GABAPENTIN 300 MG: 300 CAPSULE ORAL at 03:11

## 2018-11-07 RX ADMIN — INSULIN ASPART 4 UNITS: 100 INJECTION, SOLUTION INTRAVENOUS; SUBCUTANEOUS at 12:11

## 2018-11-07 RX ADMIN — GABAPENTIN 300 MG: 300 CAPSULE ORAL at 09:11

## 2018-11-07 RX ADMIN — TACROLIMUS 1.5 MG: 0.5 CAPSULE ORAL at 05:11

## 2018-11-07 RX ADMIN — BUDESONIDE 0.5 MG: 0.5 SUSPENSION RESPIRATORY (INHALATION) at 07:11

## 2018-11-07 RX ADMIN — METOPROLOL TARTRATE 25 MG: 25 TABLET ORAL at 09:11

## 2018-11-07 RX ADMIN — HUMAN INSULIN 20 UNITS: 100 INJECTION, SUSPENSION SUBCUTANEOUS at 04:11

## 2018-11-07 RX ADMIN — LEVOFLOXACIN 750 MG: 750 TABLET, FILM COATED ORAL at 09:11

## 2018-11-07 RX ADMIN — SODIUM BICARBONATE 650 MG TABLET 2600 MG: at 09:11

## 2018-11-07 RX ADMIN — PREDNISONE 5 MG: 5 TABLET ORAL at 09:11

## 2018-11-07 NOTE — ASSESSMENT & PLAN NOTE
10/31- will continue close glucose control.  Insulin therapy     11/1- will continue insulin sliding scale , continue close follow up    11/2- insulin sliding scale as per primary team   11/3-  Will continue insulin sliding scale ,diabetic diet     11/4- diabetic diet , insulin sliding scale     11/6- will continue insulin scale

## 2018-11-07 NOTE — PROGRESS NOTES
[facility]  Infectious Disease  Progress Note    Patient Name: Lavelle Ladd  MRN: [id number]  Admission Date: 10/31/2018  Length of Stay: 7 days  Attending Physician: Ruma Rand MD  Primary Care Provider: Rishabh Esteban MD    Isolation Status: Contact  Assessment/Plan:      * S/P transmetatarsal amputation of foot, left    10/31- podiatry follow up , continue close follow up     11/1- will use operative cultures to guide therapy -prelim cultures -GNR, will continue zosyn and stop vanco ,follow ID of GNR     11/2- will follow pathology report and final cultures -prelim cultures- enterobacter /enterococcus-will add zyvox for possible VRE to zosyn.  Will adjust therapy with final drug susceptibility   11/6- will continue close podiatry follow up, will need therapy for osteomyelitis -  For stenotrophomonas-will use Levaquin -susceptibility confirmed with microlab  VRE-will use Zyvoc  Enterobacter -will use rocephin for now   Follow fever curve      Abscess of left foot    10/31- will continue close podiatry follow up , will use cultures to guide therapy   All previous cultures reviewed-  Newer results are available. Click to view them now.   Component 3mo ago   Aerobic Bacterial Culture CITROBACTER FREUNDII   Rare       Aerobic Bacterial Culture KLEBSIELLA OXYTOCA   Rare         09/2018- MSSA  Will continue Zosyn/Vanco for now.  Vancomycin might be stopped soon    11/3- wound cultures =VRE and enterobacter -will continue Zyvox and will use Rocephin .  Will follow anaerobic cultures    11/4 All cultures reviewed- VRE, enterobacter and stenotrophomonas- will add bactrim for stenotrophomonas and will contact microlab for Avelox sensitivity , continue Zyvox for VRE and Rocephin for enterobacter   11/6 will continue  Zyvox and will add Levaquin -this will cover both enterobacter and stenotrophomas.  Will discuss with primary team about discharge options as he might need parenteral therapy       Type 2 diabetes mellitus with hyperglycemia, with long-term current use of insulin    10/31- will continue close glucose control.  Insulin therapy     11/1- will continue insulin sliding scale , continue close follow up    11/2- insulin sliding scale as per primary team   11/3-  Will continue insulin sliding scale ,diabetic diet     11/4- diabetic diet , insulin sliding scale     11/6- will continue insulin scale      Essential hypertension    11/1- will continue norvasc     11/2- will continue Norvasc , closely monitor BP    11/3- will continue norvasc          Anticipated Disposition:     Thank you for your consult. I will follow-up with patient. Please contact us if you have any additional questions.    Ronny Veliz MD  Infectious Disease  [facility]    Subjective:     Principal Problem:S/P transmetatarsal amputation of foot, left    HPI:      63 year old  male  with a PMHx of COPD, CAD, Diastolic CHF, CKD, Renal transplant, GERD, Hepatitis C, HLD, HTN, PVD, and IDDM who presented to the hospital as a direct admit for planned surgery today per Dr. Wheeler.   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  Patient underwent delayed primary wound closure, on 9/24/18.  He presented to podiatry today for his 2 week follow-up of wound closure 2/2 to wound dehiscence.    All previous cultures reviewed -  09/21-    Wound cx on 9/21 showed MSSA  07/2018-  Newer results are available. Click to view them now.   Component 3mo ago   Aerobic Bacterial Culture CITROBACTER FREUNDII   Rare       Aerobic Bacterial Culture KLEBSIELLA OXYTOCA   Rare           He had on 10/31-   1. Left foot Irrigation and debridement to bone  2. Revisional Transmetatarsal Amputation, Left foot        Interval History: 63 year old man with diabetic foot.  All previous cultures reviewed   10/31- GNR  09/21- MSSA  He does not want LTAC   11/2- latest wound cultures  -enterococcus/enterobacter   He is afebrile   11/3-wound cultures -VRE/ enterobacter   He says he does not feel well     11/4- more wound cultures= stenotrophomonas  VRE and enterobacter   He is afebrile   11/6- All cultures reviewed   Stenotrophomas is levaquin sensitive   Other cultures -VRE and enterobacter   Review of Systems   Constitutional: Negative for chills, diaphoresis, fatigue and fever.   HENT: Negative for hearing loss, mouth sores, sore throat, tinnitus and trouble swallowing.    Eyes: Negative for pain, discharge and redness.   Respiratory: Negative for apnea, cough, choking, chest tightness, shortness of breath, wheezing and stridor.    Cardiovascular: Negative for chest pain, palpitations and leg swelling.   Gastrointestinal: Negative for abdominal distention, abdominal pain, blood in stool, constipation, diarrhea, nausea, rectal pain and vomiting.   Endocrine: Negative for cold intolerance, heat intolerance, polydipsia, polyphagia and polyuria.   Genitourinary: Negative for difficulty urinating, dysuria, flank pain, frequency, hematuria and urgency.   Musculoskeletal: Negative for arthralgias, back pain, gait problem, joint swelling, neck pain and neck stiffness.   Skin: Positive for color change and wound. Negative for rash.        Left foot with worsening erythema.    Allergic/Immunologic: Negative for food allergies.   Neurological: Negative for dizziness, tremors, seizures, syncope, speech difficulty, light-headedness and headaches.   Hematological: Negative for adenopathy. Does not bruise/bleed easily.   Psychiatric/Behavioral: Negative for agitation and confusion. The patient is not nervous/anxious.    All other systems reviewed and are negative.    Objective:     Vital Signs (Most Recent):  Temp: 98.8 °F (37.1 °C) (11/07/18 0712)  Pulse: 76 (11/07/18 0712)  Resp: 16 (11/07/18 0712)  BP: (!) 109/58 (11/07/18 0712)  SpO2: 95 % (11/07/18 0712) Vital Signs (24h Range):  Temp:  [98.1 °F (36.7  °C)-101.4 °F (38.6 °C)] 98.8 °F (37.1 °C)  Pulse:  [70-81] 76  Resp:  [12-18] 16  SpO2:  [89 %-95 %] 95 %  BP: ()/(50-58) 109/58     Weight: 99.8 kg (220 lb 0.3 oz)  Body mass index is 30.69 kg/m².    Estimated Creatinine Clearance: 34.1 mL/min (A) (based on SCr of 2.6 mg/dL (H)).    Physical Exam   Constitutional: He is oriented to person, place, and time. He appears well-developed and well-nourished. No distress.   HENT:   Head: Normocephalic and atraumatic.   Mouth/Throat: Oropharynx is clear and moist.   Eyes: Conjunctivae and EOM are normal. Pupils are equal, round, and reactive to light.   Neck: Normal range of motion. Neck supple. No JVD present.   Cardiovascular: Normal rate, regular rhythm, normal heart sounds and intact distal pulses. Exam reveals no gallop and no friction rub.   No murmur heard.  Pulmonary/Chest: Effort normal and breath sounds normal. No stridor. No respiratory distress. He has no wheezes. He has no rales. He exhibits no tenderness.   Abdominal: Soft. Bowel sounds are normal. He exhibits no distension and no mass. There is no tenderness. There is no rebound and no guarding.   Musculoskeletal: Normal range of motion. He exhibits no edema or tenderness.   Dressing over foot noted   Neurological: He is alert and oriented to person, place, and time. No cranial nerve deficit.   Skin: Skin is warm and dry. No rash noted. He is not diaphoretic. There is erythema.   -   Psychiatric: He has a normal mood and affect. His speech is normal. Judgment and thought content normal. He is withdrawn. Cognition and memory are normal. He is inattentive.   Nursing note and vitals reviewed.      Significant Labs:   Blood Culture:   Recent Labs   Lab 09/18/18  1812 09/21/18  0907 09/21/18  1516 10/31/18  1054 10/31/18  1055   LABBLOO Gram stain aer bottle: Gram positive cocci in clusters resembling Staph   Gram stain susan bottle: Gram positive cocci in clusters resembling Staph   Results called to and  read back by:NOA Eckert RN 09/19/2018  15:31  STAPHYLOCOCCUS AUREUS  ID consult required at [facility] [facility] locations.  For susceptibility see order # [id number]   No growth after 5 days. No growth after 5 days. No growth after 5 days. No growth after 5 days.     BMP:   Recent Labs   Lab 11/07/18 0457   *   *   K 4.1      CO2 23   BUN 31*   CREATININE 2.6*   CALCIUM 8.5*     CBC:   Recent Labs   Lab 11/06/18 0435 11/07/18 0457   WBC 7.15 8.62   HGB 11.0* 9.8*   HCT 35.4* 31.8*    205     CMP:   Recent Labs   Lab 11/06/18 0435 11/07/18 0457    134*   K 4.3 4.1    101   CO2 22* 23   GLU 95 194*   BUN 21 31*   CREATININE 1.8* 2.6*   CALCIUM 9.4 8.5*   ANIONGAP 13 10   EGFRNONAA 39* 25*     Wound Culture:   Recent Labs   Lab 07/09/18  0856 09/21/18  1235 10/18/18  1328 10/31/18  0911 10/31/18  1640   LABAERO CITROBACTER FREUNDII  Rare    KLEBSIELLA OXYTOCA  Rare   STAPHYLOCOCCUS AUREUS  Few  Skin skylar also present   Skin skylar,  no predominant organism ENTEROBACTER CLOACAE  Many    STENOTROPHOMONAS (X.) MALTOPHILIA  Moderate   ENTEROCOCCUS FAECIUM VRE  Rare    ENTEROBACTER CLOACAE  Rare         Significant Imaging: I have reviewed all pertinent imaging results/findings within the past 24 hours.Significant Imaging: I have reviewed all pertinent imaging results/findings within the past 24 hours.

## 2018-11-07 NOTE — PROGRESS NOTES
[facility] -   Nephrology  Progress Note    Patient Name: Lavelle Ladd  MRN: [id number]  Admission Date: 10/31/2018  Hospital Length of Stay: 7 days  Attending Provider: Ruma Rand MD   Primary Care Physician: Rishabh Esteban MD  Principal Problem:S/P transmetatarsal amputation of foot, left    Subjective:     HPI: Lavelle Ladd is a pleasant 63 y old  man  , s/p   donor ( brain death ) kidney transplant on 16.  He has CKD stage 3 - GFR 30-59 and his baseline creatinine is between 1.5-1.8. He takes  prednisone 5 mg daily and tacrolimus 1.5 mg twice a day , for maintenance immunosuppression.  He he is also on CellCept 500 mg twice a day, will hold this medication due to cellulitis of his left foot, he is status post left transmetatarsal amputation on 2018, patient was seen for follow-up appointment in the clinic yesterday and notice that his foot was infected, patient was admitted to the hospital for further management,      Interval History: Pt was seen and examined. Stable last pm, noted that s Cr is higher today, denies GI losses, no diarrhea, no NSAIDs given, on narcotics prn. Has no active c/o's, no discomfort, no urinary issues.    Review of patient's allergies indicates:  No Known Allergies  Current Facility-Administered Medications   Medication Frequency    0.9%  NaCl infusion Continuous    acetaminophen oral solution 650 mg Q4H PRN    acetaminophen tablet 650 mg Q6H PRN    [START ON 2018] amLODIPine tablet 2.5 mg Daily    arformoterol nebulizer solution 15 mcg Q12H    aspirin EC tablet 81 mg Daily    benzonatate capsule 100 mg TID PRN    budesonide nebulizer solution 0.5 mg Q12H    dextrose 50% injection 12.5 g PRN    dextrose 50% injection 25 g PRN    gabapentin capsule 300 mg TID    glucagon (human recombinant) injection 1 mg PRN    glucose chewable tablet 16 g PRN    glucose chewable tablet 24 g PRN    insulin aspart U-100 pen  1-10 Units QID (AC + HS) PRN    insulin NPH injection 20 Units BID AC    levoFLOXacin tablet 750 mg Every other day    linezolid tablet 600 mg Q12H    metoclopramide HCl injection 10 mg Q10 Min PRN    metoprolol tartrate (LOPRESSOR) tablet 25 mg BID    ondansetron disintegrating tablet 8 mg Q8H PRN    ondansetron injection 4 mg Q8H PRN    predniSONE tablet 5 mg Daily    sodium bicarbonate tablet 2,600 mg BID    sodium chloride 0.9% flush 3 mL PRN    tacrolimus capsule 1.5 mg BID       Objective:     Vital Signs (Most Recent):  Temp: 98.8 °F (37.1 °C) (11/07/18 0712)  Pulse: 78 (11/07/18 0750)  Resp: 20 (11/07/18 0750)  BP: (!) 100/53 (11/07/18 0844)  SpO2: (!) 94 % (11/07/18 0750)  O2 Device (Oxygen Therapy): nasal cannula (11/07/18 0750) Vital Signs (24h Range):  Temp:  [98.1 °F (36.7 °C)-101.4 °F (38.6 °C)] 98.8 °F (37.1 °C)  Pulse:  [70-81] 78  Resp:  [12-20] 20  SpO2:  [89 %-95 %] 94 %  BP: ()/(50-58) 100/53     Weight: 99.8 kg (220 lb 0.3 oz) (10/31/18 1049)  Body mass index is 30.69 kg/m².  Body surface area is 2.24 meters squared.    I/O last 3 completed shifts:  In: 1175.8 [P.O.:480; I.V.:545.8; IV Piggyback:150]  Out: 500 [Urine:500]    Physical Exam   Constitutional: He is oriented to person, place, and time. He appears well-developed and well-nourished.   HENT:   Head: Normocephalic and atraumatic.   Neck: No JVD present.   Cardiovascular: Normal rate, regular rhythm and normal heart sounds.   Pulmonary/Chest: No respiratory distress. He has no rales.   Abdominal: Soft. He exhibits no distension.   Musculoskeletal: He exhibits no edema.   Neurological: He is oriented to person, place, and time.   Skin: Skin is dry.   Psychiatric: He has a normal mood and affect. His behavior is normal.   Nursing note and vitals reviewed.      Significant Labs: reviewed  BMP  Lab Results   Component Value Date     (L) 11/07/2018    K 4.1 11/07/2018     11/07/2018    CO2 23 11/07/2018    BUN 31  (H) 11/07/2018    CREATININE 2.6 (H) 11/07/2018    CALCIUM 8.5 (L) 11/07/2018    ANIONGAP 10 11/07/2018    ESTGFRAFRICA 29 (A) 11/07/2018    EGFRNONAA 25 (A) 11/07/2018     Lab Results   Component Value Date    WBC 8.62 11/07/2018    HGB 9.8 (L) 11/07/2018    HCT 31.8 (L) 11/07/2018    MCV 92 11/07/2018     11/07/2018     Prograf 5.8    Assessment/Plan:         64 y/o male with a h/o of KTx has wound infection after amputation:                  Kidney transplant recipient     CKD stage 3. Noted s Cr higher today, c/w DINORAH on CKD  Likely cause may be hypotension  No diarrhea reported  No urinary issues  K normal  Metabolic acidosis, mild, stable, improved  Hyponatremia, had been corrected, has reoccurred, due to renal failure     H/o of cadaveric kidney transplant in May 2016  On immunosuppressive therapy  Last prograf level was within the therapeutic range  Repeat prograf level within the therapeutic range  No change in dose of prograf, continue same dose  Cellcept is on hold, keep on hold, due to current and active infection     HTN : BP was controlled to low  Was previously high  Meds reviewed  Will hold ACE-I and lower the dose of amlodipine     H/o of DM  Diabetic nephropathy               * Abscess of left foot     Left foot wound infection post mid foot amputation  Wound cx growing Enterococcus and Enterobacter  Has VRE  Contact precaution  Has hypotension today, but is afebrile, and WBC is not high  Blood cx's from 10/31/18 negative  Likely not septic, dipesh will monitor closely.  Abx reviewed   Continue zosyn and zyvox   S/p surgery and wound closure 2 days ago            Plans and recommendations:  As discussed above  Will f/u closely.  Send urine Na and Cr to calculate FENa  D/c lisinopril  Reduce amlodipine dose from 10 to 2.5 mg po qd  Repeat labs  Agree withgentle IVF rehydration  Will repeat blood cx x 2               Maureen Cherry MD  Nephrology  [facility]

## 2018-11-07 NOTE — ASSESSMENT & PLAN NOTE
10/31- will continue close podiatry follow up , will use cultures to guide therapy   All previous cultures reviewed-  Newer results are available. Click to view them now.   Component 3mo ago   Aerobic Bacterial Culture CITROBACTER FREUNDII   Rare       Aerobic Bacterial Culture KLEBSIELLA OXYTOCA   Rare         09/2018- MSSA  Will continue Zosyn/Vanco for now.  Vancomycin might be stopped soon    11/3- wound cultures =VRE and enterobacter -will continue Zyvox and will use Rocephin .  Will follow anaerobic cultures    11/4 All cultures reviewed- VRE, enterobacter and stenotrophomonas- will add bactrim for stenotrophomonas and will contact microlab for Avelox sensitivity , continue Zyvox for VRE and Rocephin for enterobacter   11/6 will continue  Zyvox and will add Levaquin -this will cover both enterobacter and stenotrophomas.  Will discuss with primary team about discharge options as he might need parenteral therapy

## 2018-11-07 NOTE — PLAN OF CARE
Problem: Patient Care Overview  Goal: Plan of Care Review  Outcome: Ongoing (interventions implemented as appropriate)  Remains free from harm, c/o pain - refuses offered meds, continues to be rude to staff and uncooperative / argumentative, positions self, will continue to monitor. 24 hour cahrt check complete.

## 2018-11-07 NOTE — ASSESSMENT & PLAN NOTE
- patient is noted to be a poor historian and does not know what medications he takes at home  - per d/w Edmundo Hernandez today, he is currently prescribed Prednisone 5 mg daily, Cellcept 500 mg BID, and Prograf 1.5 mg BID  - Nephrology consulted  - Continue home Prednisone and Prograf for now  - Hold Cellcept per Dr. Staley  - Monitor Prograf levels   - Followed by Dr. Blanco in [place]   -Continue current medical management plan   -Nephrology following closely    -Creatinine increased to 2.6 most likely due to hypotension   -IVFs

## 2018-11-07 NOTE — PLAN OF CARE
PAOLA spoke to Sari with [facility].  She is unsure if she has a private contact isolation room.  She will check with her office and call CM back.  CM made referrals to [facility] and [facility] to review in case [facility] does not have a bed.    @[facility] declined.  No bed available.

## 2018-11-07 NOTE — ASSESSMENT & PLAN NOTE
-Creatinine 1.8 today. Slight increase. Closely monitor  - Daily BMP  - Monitor  -Creatinine 1.5 at baseline   -Creatinine stable  -Nephrology following   -Remains stable   -No change  11/7/18  -Creatinine 2.6, most likely 2/2 hypotension   -Bp medications adjusted   -IVFs

## 2018-11-07 NOTE — PROGRESS NOTES
Patient has been persistently hypotensive with SBP <100 over the past 24 hours.  He remains asymptomatic.  Bidil and lisinopril recently added 11/4 & 11/5 for uncontrolled HTN.  Bidil discontinued, and lisinopril decreased to 2.5 mg daily.  Will continue to monitor BP trends and titrate antihypertensives as needed.          Theodora Dumont, NP

## 2018-11-07 NOTE — ASSESSMENT & PLAN NOTE
10/31- podiatry follow up , continue close follow up     11/1- will use operative cultures to guide therapy -prelim cultures -GNR, will continue zosyn and stop vanco ,follow ID of GNR     11/2- will follow pathology report and final cultures -prelim cultures- enterobacter /enterococcus-will add zyvox for possible VRE to zosyn.  Will adjust therapy with final drug susceptibility   11/6- will continue close podiatry follow up, will need therapy for osteomyelitis -  For stenotrophomonas-will use Levaquin -susceptibility confirmed with microlab  VRE-will use Zyvoc  Enterobacter -will use rocephin for now   Follow fever curve

## 2018-11-07 NOTE — SUBJECTIVE & OBJECTIVE
Interval History: Pt was seen and examined. Stable last pm, noted that s Cr is higher today, denies GI losses, no diarrhea, no NSAIDs given, on narcotics prn. Has no active c/o's, no discomfort, no urinary issues.    Review of patient's allergies indicates:  No Known Allergies  Current Facility-Administered Medications   Medication Frequency    0.9%  NaCl infusion Continuous    acetaminophen oral solution 650 mg Q4H PRN    acetaminophen tablet 650 mg Q6H PRN    [START ON 11/8/2018] amLODIPine tablet 2.5 mg Daily    arformoterol nebulizer solution 15 mcg Q12H    aspirin EC tablet 81 mg Daily    benzonatate capsule 100 mg TID PRN    budesonide nebulizer solution 0.5 mg Q12H    dextrose 50% injection 12.5 g PRN    dextrose 50% injection 25 g PRN    gabapentin capsule 300 mg TID    glucagon (human recombinant) injection 1 mg PRN    glucose chewable tablet 16 g PRN    glucose chewable tablet 24 g PRN    insulin aspart U-100 pen 1-10 Units QID (AC + HS) PRN    insulin NPH injection 20 Units BID AC    levoFLOXacin tablet 750 mg Every other day    linezolid tablet 600 mg Q12H    metoclopramide HCl injection 10 mg Q10 Min PRN    metoprolol tartrate (LOPRESSOR) tablet 25 mg BID    ondansetron disintegrating tablet 8 mg Q8H PRN    ondansetron injection 4 mg Q8H PRN    predniSONE tablet 5 mg Daily    sodium bicarbonate tablet 2,600 mg BID    sodium chloride 0.9% flush 3 mL PRN    tacrolimus capsule 1.5 mg BID       Objective:     Vital Signs (Most Recent):  Temp: 98.8 °F (37.1 °C) (11/07/18 0712)  Pulse: 78 (11/07/18 0750)  Resp: 20 (11/07/18 0750)  BP: (!) 100/53 (11/07/18 0844)  SpO2: (!) 94 % (11/07/18 0750)  O2 Device (Oxygen Therapy): nasal cannula (11/07/18 0750) Vital Signs (24h Range):  Temp:  [98.1 °F (36.7 °C)-101.4 °F (38.6 °C)] 98.8 °F (37.1 °C)  Pulse:  [70-81] 78  Resp:  [12-20] 20  SpO2:  [89 %-95 %] 94 %  BP: ()/(50-58) 100/53     Weight: 99.8 kg (220 lb 0.3 oz) (10/31/18  1049)  Body mass index is 30.69 kg/m².  Body surface area is 2.24 meters squared.    I/O last 3 completed shifts:  In: 1175.8 [P.O.:480; I.V.:545.8; IV Piggyback:150]  Out: 500 [Urine:500]    Physical Exam   Constitutional: He is oriented to person, place, and time. He appears well-developed and well-nourished.   HENT:   Head: Normocephalic and atraumatic.   Neck: No JVD present.   Cardiovascular: Normal rate, regular rhythm and normal heart sounds.   Pulmonary/Chest: No respiratory distress. He has no rales.   Abdominal: Soft. He exhibits no distension.   Musculoskeletal: He exhibits no edema.   Neurological: He is oriented to person, place, and time.   Skin: Skin is dry.   Psychiatric: He has a normal mood and affect. His behavior is normal.   Nursing note and vitals reviewed.      Significant Labs: reviewed  BMP  Lab Results   Component Value Date     (L) 11/07/2018    K 4.1 11/07/2018     11/07/2018    CO2 23 11/07/2018    BUN 31 (H) 11/07/2018    CREATININE 2.6 (H) 11/07/2018    CALCIUM 8.5 (L) 11/07/2018    ANIONGAP 10 11/07/2018    ESTGFRAFRICA 29 (A) 11/07/2018    EGFRNONAA 25 (A) 11/07/2018     Lab Results   Component Value Date    WBC 8.62 11/07/2018    HGB 9.8 (L) 11/07/2018    HCT 31.8 (L) 11/07/2018    MCV 92 11/07/2018     11/07/2018

## 2018-11-07 NOTE — NURSING
"Pt requested PRN Norco. Explained to pt that narcotics had been discontinued d/t lethargy and hypotension. Pt offered acetaminophen for pain. Pt became very angry and cursed at staff, stating "I'm a kidney transplant and can't have acetaminophen." Educated pt on being able to take acetaminophen and reminded pt that he had been taking it, and had had some earlier today. Pt stated "Well I didn't f*%^ing know that. And yall don't know s%*t." NELSON Dumont, VERO made aware, no new orders.   "

## 2018-11-07 NOTE — PROGRESS NOTES
[facility] Hospital Medicine  Progress Note    Patient Name: Lavelle Ladd  MRN: [id number]  Patient Class: IP- Inpatient   Admission Date: 10/31/2018  Length of Stay: 7 days  Attending Physician: Ruma Rand MD  Primary Care Provider: Rishabh Esteban MD        Subjective:     Principal Problem:S/P transmetatarsal amputation of foot, left    HPI:  Lavelle Ladd is a 63 y.o. male  with a PMHx of COPD, CAD, Diastolic CHF, CKD, Renal transplant, GERD, Hepatitis C, HLD, HTN, PVD, and IDDM who presented to the hospital today as a direct admit for planned surgery today per Dr. Wheeler.  Left foot xray today showed amputation of the distal aspect of the left foot at the level of the metatarsal bones.  No lytic abnormalities to suggest osteomyelitis by plain radiograph.  No evidence of acute fracture or dislocation.  Bony mineralization is normal.  Diffuse soft tissue and dense vascular calcifications.  Large plantar calcaneal spur.  He is s/p left transmetatarsal amputation secondary to gangrene of multiple digits and JOSE R.  Patient underwent delayed primary wound closure, on 9/24/18.  He presented to podiatry today for his 2 week follow-up of wound closure 2/2 to wound dehiscence.  Patient missed his last postoperative visit as he states he was unable to get a ride to his appointment.  He also admits to ambulating on his amputation site.  He drove himself to clinic today as he was unable to get a ride.  He states that he has gotten his foot wet as he has taken showers covering extremity in a garbage bag.  He denies any fever, chills, foot pain, CP, SOB, N/V/D, and all other symptoms at this time.  Patient has a history of poor compliance and difficult living dispo as he lives alone.  In the past we did try to get patient admitted to skilled nursing facility, but he has refused such services.   Wound cx on 9/21 showed MSSA for which he has completed IV cefazolin for 14 days secondary to MSSA  gangrene.  He has been NPO since midnight.  He will be admitted to the Medicine unit under the care of Hospital Medicine.  ID consulted per podiatry recs to guide antibiotic therapy postoperatively.  He is a Full [employer].  His SDM is his sister Kecia Williamson who can be reached at [phone].                Hospital Course:  11/1/18 The patient is S/P I&D of left foot yesterday. No acute issues overnight. The patient reports that his pain is well controlled. Continue IV ABX, cultures pending. Infectious disease is following. 11/2/18 wound culture growing Enterococcus and Enterobacter. Podiatry recommends SNF vs LTAC placement due to potential for limb loss. 11/3/18- Will continue zyvox and zosyn. Creatinine level at baseline. Surgery planned for Monday by Dr. Wheeler for secondary wound closure. 11/4/18- Patient awake and alert today. Dr. Guthrie at bedside performing wound care to left transmetatarsal amputation stump. Patient tolerated well. Plan for surgery tomorrow. Continue zyvox, IV zosyn discontinued and IV Rocephin added. 11/5/18-Creatinine stable. Blood pressure elevate this morning, lisinopril added per nephrology. Awaiting surgery for wound closure. 11/6/18-  S/p Left foot Secondary Wound Closure and debridement of Bone/Transmetatarsal Amputation by Dr. Wheeler.  Patine spike fever this afternoon. WBCs remain negative. CXR pending. Encourage IS use. Infectious disease on board. Social work consult for discharge planning (SNF vs LTAC). Patient will need IV abx x 6 weeks. 11/7/18- Early morning patient became hypotensive. Bidil and Lisinopril discontinued, amlodipine decreased to 2.5 mg daily. Blood pressure still marginally low but better. Creatinine increased to 2.6, most likely due to hypotension. Continue IVFs.                  Interval History: Early morning patient became hypotensive. Bidil discontinued, and lisinopril decreased to 2.5 mg daily. Blood pressure better but marginally low. Monitor  closely. Creatinine increases to 2.6, most likely due to hypotension. Continue IVFs.       Review of Systems   Constitutional: Negative for chills, diaphoresis, fatigue and fever.   HENT: Negative for hearing loss, mouth sores, sore throat, tinnitus and trouble swallowing.    Eyes: Negative for pain, discharge and redness.   Respiratory: Negative for apnea, cough, choking, chest tightness, shortness of breath, wheezing and stridor.    Cardiovascular: Negative for chest pain, palpitations and leg swelling.   Gastrointestinal: Negative for abdominal distention, abdominal pain, blood in stool, constipation, diarrhea, nausea, rectal pain and vomiting.   Endocrine: Negative for cold intolerance, heat intolerance, polydipsia, polyphagia and polyuria.   Genitourinary: Negative for difficulty urinating, dysuria, flank pain, frequency, hematuria and urgency.   Musculoskeletal: Negative for arthralgias, back pain, gait problem, joint swelling, neck pain and neck stiffness.   Skin: Positive for color change and wound. Negative for rash.   Allergic/Immunologic: Negative for food allergies.   Neurological: Negative for dizziness, tremors, seizures, syncope, speech difficulty, light-headedness and headaches.   Hematological: Negative for adenopathy. Does not bruise/bleed easily.   Psychiatric/Behavioral: Negative for agitation and confusion. The patient is not nervous/anxious.    All other systems reviewed and are negative.    Objective:     Vital Signs (Most Recent):  Temp: 99.1 °F (37.3 °C) (11/07/18 1157)  Pulse: 88 (11/07/18 1157)  Resp: 17 (11/07/18 1157)  BP: (!) 115/58 (11/07/18 1157)  SpO2: (!) 88 % (11/07/18 1157) Vital Signs (24h Range):  Temp:  [98.1 °F (36.7 °C)-101.4 °F (38.6 °C)] 99.1 °F (37.3 °C)  Pulse:  [70-88] 88  Resp:  [12-20] 17  SpO2:  [88 %-95 %] 88 %  BP: ()/(50-58) 115/58     Weight: 99.8 kg (220 lb 0.3 oz)  Body mass index is 30.69 kg/m².    Intake/Output Summary (Last 24 hours) at 11/7/2018  1223  Last data filed at 11/7/2018 0830  Gross per 24 hour   Intake 770 ml   Output 300 ml   Net 470 ml      Physical Exam   Constitutional: He is oriented to person, place, and time. He appears well-developed and well-nourished. No distress.   HENT:   Head: Normocephalic and atraumatic.   Mouth/Throat: Oropharynx is clear and moist.   Eyes: Conjunctivae and EOM are normal. Pupils are equal, round, and reactive to light.   Neck: Normal range of motion. Neck supple. No JVD present.   Cardiovascular: Normal rate, regular rhythm, normal heart sounds and intact distal pulses. Exam reveals no gallop and no friction rub.   No murmur heard.  Pulmonary/Chest: Effort normal and breath sounds normal. No stridor. No respiratory distress. He has no wheezes. He has no rales. He exhibits no tenderness.   Abdominal: Soft. Bowel sounds are normal. He exhibits no distension and no mass. There is no tenderness. There is no rebound and no guarding.   Musculoskeletal: Normal range of motion. He exhibits no edema or tenderness.   Neurological: He is alert and oriented to person, place, and time. No cranial nerve deficit.   Skin: Skin is warm and dry. No rash noted. He is not diaphoretic. No erythema.   LLE dressing CDI.    Psychiatric: He has a normal mood and affect. His speech is normal and behavior is normal. Judgment and thought content normal. Cognition and memory are normal.   Nursing note and vitals reviewed.      Significant Labs:   BMP:   Recent Labs   Lab 11/07/18  0457   *   *   K 4.1      CO2 23   BUN 31*   CREATININE 2.6*   CALCIUM 8.5*     CBC:   Recent Labs   Lab 11/06/18  0435 11/07/18  0457   WBC 7.15 8.62   HGB 11.0* 9.8*   HCT 35.4* 31.8*    205     All pertinent labs within the past 24 hours have been reviewed.    Significant Imaging:       Assessment/Plan:      * S/P transmetatarsal amputation of foot, left    - with delayed wound healing secondary to non-compliance with podiatry  recommendations (patient reports getting foot wet and bearing weight)  - Defer care to podiatry  -wound cultures growing Enterobacter and Enterococcus  -scheduled for wound closure on Monday 11/5/18  -Infectious Disease following  -will need LTAC vs SNF  -Will continue zyvox and zosyn   11/4/18  -Surgery planned for tomorrow  -ID following  -IV zosyn discontinued   -Continue Zyvox po and IV Rocephin added   11/5/18  -Awaiting surgery for wound closure   11/6/18  -S/p Left foot Secondary Wound Closure and debridement of Bone/Transmetatarsal Amputation by Dr. Wheeler.   -Patient spike fever of 101.4F, IS encouraged   - CXR pending   -Social work consult for discharge planning (SNF vs LTAC). Patient will need IV abx for 6 weeks   11/7/18  -CXR showed no acute findings   -WBCs remain negative, no fever in 24 hours   -hypotension today, but is afebrile, and WBC is not high  - Likely not septic, dipesh will monitor closely.     Immunosuppression    Hx of kidney transplant   Continue prednisone and Prograf per Nephrology  Prograf level 5.8       Hx of right BKA    - Site is intact, well healed   -No s/s of infection          Non compliance with medical treatment    - Educated on the importance of adhering to medical treatment plan  - Patient has a limited support system  - Social work consult for DC planning (SNF vs LTAC)       Abscess of left foot    See plan for s/p transmetatarsal amputation of foot  Wound cultures grew VRE and enterobacter  ID following   Zyvox po and IV Rocephin .  Will follow anaerobic cultures  Bactrim added for stenotrophomonas by Dr. Veliz   Continue current treatment   Continue wound care      Peripheral vascular disease    - Resume home ASA   - Vascular f/u as needed       Chronic bilateral low back pain with left-sided sciatica    - Resume home Norco PRN  - Monitor  -Pain controlled  -Stable      Kidney transplant recipient    - patient is noted to be a poor historian and does not know what  medications he takes at home  - per d/w Edmundo Hernandez today, he is currently prescribed Prednisone 5 mg daily, Cellcept 500 mg BID, and Prograf 1.5 mg BID  - Nephrology consulted  - Continue home Prednisone and Prograf for now  - Hold Cellcept per Dr. Staley  - Monitor Prograf levels   - Followed by Dr. Blanco in [place]   -Continue current medical management plan   -Nephrology following closely    -Creatinine increased to 2.6 most likely due to hypotension   -IVFs        Acute renal failure superimposed on stage 3 chronic kidney disease    -Creatinine 1.8 today. Slight increase. Closely monitor  - Daily BMP  - Monitor  -Creatinine 1.5 at baseline   -Creatinine stable  -Nephrology following   -Remains stable   -No change  11/7/18  -Creatinine 2.6, most likely 2/2 hypotension   -Bp medications adjusted   -IVFs        Chronic obstructive pulmonary disease    - Currently appears compensated  - Continue home meds  - Supplemental O2 PRN  - Monitor  -No change      Coronary artery disease involving native coronary artery of native heart without angina pectoris    -Continue Cardiac medications  -Cardiac monitoring  -Stable        Type 2 diabetes mellitus with hyperglycemia, with long-term current use of insulin    - A1c 8.0 in September  -Glucose slightly uncontrolled  - Accu checks ACHS with SSI PRN  - add NPH at lower doses  - Monitor  -SSI dose increased to moderate   -Continue to monitor adjust insulin accordingly        Essential hypertension    - BP well controlled  - Continue home Norvasc and Metoprolol  - Monitor  -Blood pressure needing better control, bidil added   -Lisinopril added   -BP stabilized   11/7/18  -Was previously elevated   -Bidil and Lisinopril stopped and amlodipine dose decreased to 2.5 daily   -Bp still marginally low, IVFs   -Monitor        VTE Risk Mitigation (From admission, onward)        Ordered     IP VTE HIGH RISK PATIENT  Once      11/05/18 1555     Place sequential compression device   Until discontinued      11/05/18 1557              Carla Irby NP  Department of Hospital Medicine   [facility]

## 2018-11-07 NOTE — PLAN OF CARE
Problem: Patient Care Overview  Goal: Plan of Care Review  Outcome: Ongoing (interventions implemented as appropriate)  Free from falls and injuries this shift. Pain controlled. Afebrile. Refuses I/S. Chart check completed.

## 2018-11-07 NOTE — ASSESSMENT & PLAN NOTE
- with delayed wound healing secondary to non-compliance with podiatry recommendations (patient reports getting foot wet and bearing weight)  - Defer care to podiatry  -wound cultures growing Enterobacter and Enterococcus  -scheduled for wound closure on Monday 11/5/18  -Infectious Disease following  -will need LTAC vs SNF  -Will continue zyvox and zosyn   11/4/18  -Surgery planned for tomorrow  -ID following  -IV zosyn discontinued   -Continue Zyvox po and IV Rocephin added   11/5/18  -Awaiting surgery for wound closure   11/6/18  -S/p Left foot Secondary Wound Closure and debridement of Bone/Transmetatarsal Amputation by Dr. Wheeler.   -Patient spike fever of 101.4F, IS encouraged   - CXR pending   -Social work consult for discharge planning (SNF vs LTAC). Patient will need IV abx for 6 weeks   11/7/18  -CXR showed no acute findings   -WBCs remain negative, no fever in 24 hours   -hypotension today, but is afebrile, and WBC is not high  - Likely not septic, dipesh will monitor closely.

## 2018-11-07 NOTE — ASSESSMENT & PLAN NOTE
- BP well controlled  - Continue home Norvasc and Metoprolol  - Monitor  -Blood pressure needing better control, bidil added   -Lisinopril added   -BP stabilized   11/7/18  -Was previously elevated   -Bidil and Lisinopril stopped and amlodipine dose decreased to 2.5 daily   -Bp still marginally low, IVFs   -Monitor

## 2018-11-07 NOTE — SUBJECTIVE & OBJECTIVE
Interval History: Early morning patient became hypotensive. Bidil discontinued, and lisinopril decreased to 2.5 mg daily. Blood pressure better but marginally low. Monitor closely. Creatinine increases to 2.6, most likely due to hypotension. Continue IVFs.       Review of Systems   Constitutional: Negative for chills, diaphoresis, fatigue and fever.   HENT: Negative for hearing loss, mouth sores, sore throat, tinnitus and trouble swallowing.    Eyes: Negative for pain, discharge and redness.   Respiratory: Negative for apnea, cough, choking, chest tightness, shortness of breath, wheezing and stridor.    Cardiovascular: Negative for chest pain, palpitations and leg swelling.   Gastrointestinal: Negative for abdominal distention, abdominal pain, blood in stool, constipation, diarrhea, nausea, rectal pain and vomiting.   Endocrine: Negative for cold intolerance, heat intolerance, polydipsia, polyphagia and polyuria.   Genitourinary: Negative for difficulty urinating, dysuria, flank pain, frequency, hematuria and urgency.   Musculoskeletal: Negative for arthralgias, back pain, gait problem, joint swelling, neck pain and neck stiffness.   Skin: Positive for color change and wound. Negative for rash.   Allergic/Immunologic: Negative for food allergies.   Neurological: Negative for dizziness, tremors, seizures, syncope, speech difficulty, light-headedness and headaches.   Hematological: Negative for adenopathy. Does not bruise/bleed easily.   Psychiatric/Behavioral: Negative for agitation and confusion. The patient is not nervous/anxious.    All other systems reviewed and are negative.    Objective:     Vital Signs (Most Recent):  Temp: 99.1 °F (37.3 °C) (11/07/18 1157)  Pulse: 88 (11/07/18 1157)  Resp: 17 (11/07/18 1157)  BP: (!) 115/58 (11/07/18 1157)  SpO2: (!) 88 % (11/07/18 1157) Vital Signs (24h Range):  Temp:  [98.1 °F (36.7 °C)-101.4 °F (38.6 °C)] 99.1 °F (37.3 °C)  Pulse:  [70-88] 88  Resp:  [12-20] 17  SpO2:  [88  %-95 %] 88 %  BP: ()/(50-58) 115/58     Weight: 99.8 kg (220 lb 0.3 oz)  Body mass index is 30.69 kg/m².    Intake/Output Summary (Last 24 hours) at 11/7/2018 1223  Last data filed at 11/7/2018 0830  Gross per 24 hour   Intake 770 ml   Output 300 ml   Net 470 ml      Physical Exam   Constitutional: He is oriented to person, place, and time. He appears well-developed and well-nourished. No distress.   HENT:   Head: Normocephalic and atraumatic.   Mouth/Throat: Oropharynx is clear and moist.   Eyes: Conjunctivae and EOM are normal. Pupils are equal, round, and reactive to light.   Neck: Normal range of motion. Neck supple. No JVD present.   Cardiovascular: Normal rate, regular rhythm, normal heart sounds and intact distal pulses. Exam reveals no gallop and no friction rub.   No murmur heard.  Pulmonary/Chest: Effort normal and breath sounds normal. No stridor. No respiratory distress. He has no wheezes. He has no rales. He exhibits no tenderness.   Abdominal: Soft. Bowel sounds are normal. He exhibits no distension and no mass. There is no tenderness. There is no rebound and no guarding.   Musculoskeletal: Normal range of motion. He exhibits no edema or tenderness.   Neurological: He is alert and oriented to person, place, and time. No cranial nerve deficit.   Skin: Skin is warm and dry. No rash noted. He is not diaphoretic. No erythema.   LLE dressing CDI.    Psychiatric: He has a normal mood and affect. His speech is normal and behavior is normal. Judgment and thought content normal. Cognition and memory are normal.   Nursing note and vitals reviewed.      Significant Labs:   BMP:   Recent Labs   Lab 11/07/18  0457   *   *   K 4.1      CO2 23   BUN 31*   CREATININE 2.6*   CALCIUM 8.5*     CBC:   Recent Labs   Lab 11/06/18  0435 11/07/18  0457   WBC 7.15 8.62   HGB 11.0* 9.8*   HCT 35.4* 31.8*    205     All pertinent labs within the past 24 hours have been reviewed.    Significant  Imaging:

## 2018-11-07 NOTE — ASSESSMENT & PLAN NOTE
11/1- will continue norvasc     11/2- will continue Norvasc , closely monitor BP    11/3- will continue norvasc

## 2018-11-07 NOTE — ASSESSMENT & PLAN NOTE
62 y/o male with a h/o of KTx has wound infection after amputation:                  Kidney transplant recipient     CKD stage 3. Noted s Cr a little higher today  Will monitor  Stable renal function overall  K normal  Metabolic acidosis, mild, stable, improved  Hyponatremia, corrected, Na normal now     H/o of cadaveric kidney transplant in May 2016  Doing well, stable hemodynamically  On immunosuppressive therapy  Last prograf level was within the therapeutic range  Repeat prograf level pending  No change in dose of prograf, continue same dose for now  Cellcept is on hold, keep on hold, due to current and active infection     HTN : BP was controlled after adding lisinopril yesterday  Meds reviewed  Will monitor     H/o of DM  Diabetic nephropathy               * Abscess of left foot     Left foot wound infection post mid foot amputation  Wound cx growing Enterococcus and Enterobacter  Has VRE  Contact precaution  Abx reviewed   Continue zosyn and zyvox   S/p surgery and wound closure yesterday            Plans and recommendations:  As discussed above  Will f/u closely.

## 2018-11-07 NOTE — ASSESSMENT & PLAN NOTE
See plan for s/p transmetatarsal amputation of foot  Wound cultures grew VRE and enterobacter  ID following   Zyvox po and IV Rocephin .  Will follow anaerobic cultures  Bactrim added for stenotrophomonas by Dr. Veliz   Continue current treatment   Continue wound care

## 2018-11-08 LAB
ANION GAP SERPL CALC-SCNC: 10 MMOL/L
BASOPHILS # BLD AUTO: 0 K/UL
BASOPHILS NFR BLD: 0 %
BUN SERPL-MCNC: 26 MG/DL
CALCIUM SERPL-MCNC: 8.9 MG/DL
CHLORIDE SERPL-SCNC: 104 MMOL/L
CO2 SERPL-SCNC: 23 MMOL/L
CREAT SERPL-MCNC: 2.2 MG/DL
DIFFERENTIAL METHOD: ABNORMAL
EOSINOPHIL # BLD AUTO: 0.1 K/UL
EOSINOPHIL NFR BLD: 1.5 %
ERYTHROCYTE [DISTWIDTH] IN BLOOD BY AUTOMATED COUNT: 16.3 %
EST. GFR  (AFRICAN AMERICAN): 35 ML/MIN/1.73 M^2
EST. GFR  (NON AFRICAN AMERICAN): 30 ML/MIN/1.73 M^2
GLUCOSE SERPL-MCNC: 108 MG/DL
HCT VFR BLD AUTO: 32.3 %
HGB BLD-MCNC: 10.3 G/DL
LYMPHOCYTES # BLD AUTO: 1.6 K/UL
LYMPHOCYTES NFR BLD: 21.3 %
MCH RBC QN AUTO: 28.9 PG
MCHC RBC AUTO-ENTMCNC: 31.9 G/DL
MCV RBC AUTO: 91 FL
MONOCYTES # BLD AUTO: 0.6 K/UL
MONOCYTES NFR BLD: 7.6 %
NEUTROPHILS # BLD AUTO: 5.1 K/UL
NEUTROPHILS NFR BLD: 69.6 %
PLATELET # BLD AUTO: 210 K/UL
PMV BLD AUTO: 9.2 FL
POCT GLUCOSE: 111 MG/DL (ref 70–110)
POCT GLUCOSE: 180 MG/DL (ref 70–110)
POCT GLUCOSE: 183 MG/DL (ref 70–110)
POCT GLUCOSE: 217 MG/DL (ref 70–110)
POCT GLUCOSE: 86 MG/DL (ref 70–110)
POTASSIUM SERPL-SCNC: 4.1 MMOL/L
RBC # BLD AUTO: 3.56 M/UL
SODIUM SERPL-SCNC: 137 MMOL/L
WBC # BLD AUTO: 7.26 K/UL

## 2018-11-08 PROCEDURE — 99233 SBSQ HOSP IP/OBS HIGH 50: CPT | Mod: HCNC,,, | Performed by: INTERNAL MEDICINE

## 2018-11-08 PROCEDURE — 94640 AIRWAY INHALATION TREATMENT: CPT | Mod: HCNC

## 2018-11-08 PROCEDURE — 99900035 HC TECH TIME PER 15 MIN (STAT): Mod: HCNC

## 2018-11-08 PROCEDURE — 21400001 HC TELEMETRY ROOM: Mod: HCNC

## 2018-11-08 PROCEDURE — 36415 COLL VENOUS BLD VENIPUNCTURE: CPT | Mod: HCNC

## 2018-11-08 PROCEDURE — 25000003 PHARM REV CODE 250: Mod: HCNC | Performed by: NURSE PRACTITIONER

## 2018-11-08 PROCEDURE — 80048 BASIC METABOLIC PNL TOTAL CA: CPT | Mod: HCNC

## 2018-11-08 PROCEDURE — 25000003 PHARM REV CODE 250: Mod: HCNC | Performed by: INTERNAL MEDICINE

## 2018-11-08 PROCEDURE — 94761 N-INVAS EAR/PLS OXIMETRY MLT: CPT | Mod: HCNC

## 2018-11-08 PROCEDURE — 85025 COMPLETE CBC W/AUTO DIFF WBC: CPT | Mod: HCNC

## 2018-11-08 PROCEDURE — 25000003 PHARM REV CODE 250: Mod: HCNC | Performed by: FAMILY MEDICINE

## 2018-11-08 PROCEDURE — 25000242 PHARM REV CODE 250 ALT 637 W/ HCPCS: Mod: HCNC | Performed by: PODIATRIST

## 2018-11-08 PROCEDURE — 63600175 PHARM REV CODE 636 W HCPCS: Mod: HCNC | Performed by: NURSE PRACTITIONER

## 2018-11-08 PROCEDURE — 25000003 PHARM REV CODE 250: Mod: HCNC | Performed by: PODIATRIST

## 2018-11-08 RX ORDER — HYDROCODONE BITARTRATE AND ACETAMINOPHEN 5; 325 MG/1; MG/1
1 TABLET ORAL EVERY 6 HOURS PRN
Status: DISCONTINUED | OUTPATIENT
Start: 2018-11-08 | End: 2018-11-11

## 2018-11-08 RX ADMIN — LINEZOLID 600 MG: 600 TABLET, FILM COATED ORAL at 08:11

## 2018-11-08 RX ADMIN — HUMAN INSULIN 20 UNITS: 100 INJECTION, SUSPENSION SUBCUTANEOUS at 03:11

## 2018-11-08 RX ADMIN — ASPIRIN 81 MG: 81 TABLET, COATED ORAL at 08:11

## 2018-11-08 RX ADMIN — AMLODIPINE BESYLATE 2.5 MG: 2.5 TABLET ORAL at 08:11

## 2018-11-08 RX ADMIN — SODIUM CHLORIDE: 0.9 INJECTION, SOLUTION INTRAVENOUS at 12:11

## 2018-11-08 RX ADMIN — INSULIN ASPART 1 UNITS: 100 INJECTION, SOLUTION INTRAVENOUS; SUBCUTANEOUS at 12:11

## 2018-11-08 RX ADMIN — METOPROLOL TARTRATE 25 MG: 25 TABLET ORAL at 10:11

## 2018-11-08 RX ADMIN — ARFORMOTEROL TARTRATE 15 MCG: 15 SOLUTION RESPIRATORY (INHALATION) at 07:11

## 2018-11-08 RX ADMIN — SODIUM BICARBONATE 650 MG TABLET 2600 MG: at 08:11

## 2018-11-08 RX ADMIN — HYDROCODONE BITARTRATE AND ACETAMINOPHEN 1 TABLET: 5; 325 TABLET ORAL at 06:11

## 2018-11-08 RX ADMIN — GABAPENTIN 300 MG: 300 CAPSULE ORAL at 10:11

## 2018-11-08 RX ADMIN — SODIUM BICARBONATE 650 MG TABLET 2600 MG: at 09:11

## 2018-11-08 RX ADMIN — BUDESONIDE 0.5 MG: 0.5 SUSPENSION RESPIRATORY (INHALATION) at 07:11

## 2018-11-08 RX ADMIN — PREDNISONE 5 MG: 5 TABLET ORAL at 08:11

## 2018-11-08 RX ADMIN — TACROLIMUS 1.5 MG: 0.5 CAPSULE ORAL at 08:11

## 2018-11-08 RX ADMIN — HYDROCODONE BITARTRATE AND ACETAMINOPHEN 1 TABLET: 5; 325 TABLET ORAL at 12:11

## 2018-11-08 RX ADMIN — GABAPENTIN 300 MG: 300 CAPSULE ORAL at 08:11

## 2018-11-08 RX ADMIN — TACROLIMUS 1.5 MG: 0.5 CAPSULE ORAL at 05:11

## 2018-11-08 RX ADMIN — GABAPENTIN 300 MG: 300 CAPSULE ORAL at 03:11

## 2018-11-08 RX ADMIN — HUMAN INSULIN 20 UNITS: 100 INJECTION, SUSPENSION SUBCUTANEOUS at 06:11

## 2018-11-08 RX ADMIN — METOPROLOL TARTRATE 25 MG: 25 TABLET ORAL at 08:11

## 2018-11-08 RX ADMIN — LINEZOLID 600 MG: 600 TABLET, FILM COATED ORAL at 10:11

## 2018-11-08 NOTE — ASSESSMENT & PLAN NOTE
- patient is noted to be a poor historian and does not know what medications he takes at home  - per d/w Edmundo Hernandez today, he is currently prescribed Prednisone 5 mg daily, Cellcept 500 mg BID, and Prograf 1.5 mg BID  - Nephrology consulted  - Continue home Prednisone and Prograf for now  - Hold Cellcept per Dr. Staley  - Monitor Prograf levels   - Followed by Dr. Blanco in [place]   -Continue current medical management plan   -Nephrology following closely    -Creatinine increased to 2.6 most likely due to hypotension   -IVFs

## 2018-11-08 NOTE — SUBJECTIVE & OBJECTIVE
Interval History: No acute events overnight. CT chest negative for acute findings. Blood pressure improved. Creatinine trending down currently 2.2. Will continue gentle IV hydration.      Review of Systems   Constitutional: Negative for chills, diaphoresis, fatigue and fever.   HENT: Negative for hearing loss, mouth sores, sore throat, tinnitus and trouble swallowing.    Eyes: Negative for pain, discharge and redness.   Respiratory: Negative for apnea, cough, choking, chest tightness, shortness of breath, wheezing and stridor.    Cardiovascular: Negative for chest pain, palpitations and leg swelling.   Gastrointestinal: Negative for abdominal distention, abdominal pain, blood in stool, constipation, diarrhea, nausea, rectal pain and vomiting.   Endocrine: Negative for cold intolerance, heat intolerance, polydipsia, polyphagia and polyuria.   Genitourinary: Negative for difficulty urinating, dysuria, flank pain, frequency, hematuria and urgency.   Musculoskeletal: Negative for arthralgias, back pain, gait problem, joint swelling, neck pain and neck stiffness.   Skin: Positive for color change and wound. Negative for rash.   Allergic/Immunologic: Negative for food allergies.   Neurological: Negative for dizziness, tremors, seizures, syncope, speech difficulty, light-headedness and headaches.   Hematological: Negative for adenopathy. Does not bruise/bleed easily.   Psychiatric/Behavioral: Negative for agitation and confusion. The patient is not nervous/anxious.    All other systems reviewed and are negative.    Objective:     Vital Signs (Most Recent):  Temp: 98.2 °F (36.8 °C) (11/08/18 1148)  Pulse: 73 (11/08/18 1148)  Resp: 18 (11/08/18 1148)  BP: 124/81 (11/08/18 1148)  SpO2: 97 % (11/08/18 1148) Vital Signs (24h Range):  Temp:  [98.2 °F (36.8 °C)-98.9 °F (37.2 °C)] 98.2 °F (36.8 °C)  Pulse:  [71-83] 73  Resp:  [17-20] 18  SpO2:  [92 %-97 %] 97 %  BP: (121-157)/(71-81) 124/81     Weight: 99.8 kg (220 lb 0.3  oz)  Body mass index is 30.69 kg/m².    Intake/Output Summary (Last 24 hours) at 11/8/2018 1245  Last data filed at 11/8/2018 0900  Gross per 24 hour   Intake 1307.08 ml   Output 900 ml   Net 407.08 ml      Physical Exam   Constitutional: He is oriented to person, place, and time. He appears well-developed and well-nourished. No distress.   HENT:   Head: Normocephalic and atraumatic.   Mouth/Throat: Oropharynx is clear and moist.   Eyes: Conjunctivae and EOM are normal. Pupils are equal, round, and reactive to light.   Neck: Normal range of motion. Neck supple. No JVD present.   Cardiovascular: Normal rate, regular rhythm, normal heart sounds and intact distal pulses. Exam reveals no gallop and no friction rub.   No murmur heard.  Pulmonary/Chest: Effort normal and breath sounds normal. No stridor. No respiratory distress. He has no wheezes. He has no rales. He exhibits no tenderness.   Abdominal: Soft. Bowel sounds are normal. He exhibits no distension and no mass. There is no tenderness. There is no rebound and no guarding.   Musculoskeletal: Normal range of motion. He exhibits no edema or tenderness.   Neurological: He is alert and oriented to person, place, and time. No cranial nerve deficit.   Skin: Skin is warm and dry. No rash noted. He is not diaphoretic. No erythema.   LLE dressing CDI.    Psychiatric: He has a normal mood and affect. His speech is normal and behavior is normal. Judgment and thought content normal. Cognition and memory are normal.   Nursing note and vitals reviewed.      Significant Labs:   BMP:   Recent Labs   Lab 11/08/18  0453         K 4.1      CO2 23   BUN 26*   CREATININE 2.2*   CALCIUM 8.9     CBC:   Recent Labs   Lab 11/07/18  0457 11/08/18  0453   WBC 8.62 7.26   HGB 9.8* 10.3*   HCT 31.8* 32.3*    210       Significant Imaging: I have reviewed all pertinent imaging results/findings within the past 24 hours.

## 2018-11-08 NOTE — PROGRESS NOTES
[facility] Hospital Medicine  Progress Note    Patient Name: Lavelle Ladd  MRN: [id number]  Patient Class: IP- Inpatient   Admission Date: 10/31/2018  Length of Stay: 8 days  Attending Physician: Ruma Rand MD  Primary Care Provider: Rishabh Esteban MD        Subjective:     Principal Problem:S/P transmetatarsal amputation of foot, left    HPI:  Lavelle Ladd is a 63 y.o. male  with a PMHx of COPD, CAD, Diastolic CHF, CKD, Renal transplant, GERD, Hepatitis C, HLD, HTN, PVD, and IDDM who presented to the hospital today as a direct admit for planned surgery today per Dr. Wheeler.  Left foot xray today showed amputation of the distal aspect of the left foot at the level of the metatarsal bones.  No lytic abnormalities to suggest osteomyelitis by plain radiograph.  No evidence of acute fracture or dislocation.  Bony mineralization is normal.  Diffuse soft tissue and dense vascular calcifications.  Large plantar calcaneal spur.  He is s/p left transmetatarsal amputation secondary to gangrene of multiple digits and JOSE R.  Patient underwent delayed primary wound closure, on 9/24/18.  He presented to podiatry today for his 2 week follow-up of wound closure 2/2 to wound dehiscence.  Patient missed his last postoperative visit as he states he was unable to get a ride to his appointment.  He also admits to ambulating on his amputation site.  He drove himself to clinic today as he was unable to get a ride.  He states that he has gotten his foot wet as he has taken showers covering extremity in a garbage bag.  He denies any fever, chills, foot pain, CP, SOB, N/V/D, and all other symptoms at this time.  Patient has a history of poor compliance and difficult living dispo as he lives alone.  In the past we did try to get patient admitted to skilled nursing facility, but he has refused such services.   Wound cx on 9/21 showed MSSA for which he has completed IV cefazolin for 14 days secondary to MSSA  gangrene.  He has been NPO since midnight.  He will be admitted to the Medicine unit under the care of Hospital Medicine.  ID consulted per podiatry recs to guide antibiotic therapy postoperatively.  He is a [employer].  His SDM is his sister Kecia Williamson who can be reached at [phone].                Hospital Course:  11/1/18 The patient is S/P I&D of left foot yesterday. No acute issues overnight. The patient reports that his pain is well controlled. Continue IV ABX, cultures pending. Infectious disease is following. 11/2/18 wound culture growing Enterococcus and Enterobacter. Podiatry recommends SNF vs LTAC placement due to potential for limb loss. 11/3/18- Will continue zyvox and zosyn. Creatinine level at baseline. Surgery planned for Monday by Dr. Wheeler for secondary wound closure. 11/4/18- Patient awake and alert today. Dr. Guthrie at bedside performing wound care to left transmetatarsal amputation stump. Patient tolerated well. Plan for surgery tomorrow. Continue zyvox, IV zosyn discontinued and IV Rocephin added. 11/5/18-Creatinine stable. Blood pressure elevate this morning, lisinopril added per nephrology. Awaiting surgery for wound closure. 11/6/18-  S/p Left foot Secondary Wound Closure and debridement of Bone/Transmetatarsal Amputation by Dr. Wheeler.  Patine spike fever this afternoon. WBCs remain negative. CXR pending. Encourage IS use. Infectious disease on board. Social work consult for discharge planning (SNF vs LTAC). Patient will need IV abx x 6 weeks. 11/7/18- Early morning patient became hypotensive. Bidil and Lisinopril discontinued, amlodipine decreased to 2.5 mg daily. Blood pressure still marginally low but better. Creatinine increased to 2.6, most likely due to hypotension. Continue IVFs. 11/8/18- Creatinine trending down, will continue gentle IVFs. CT chest negative for acute findings.                    Interval History: No acute events overnight. CT chest negative for acute  findings. Blood pressure improved. Creatinine trending down currently 2.2. Will continue gentle IV hydration.      Review of Systems   Constitutional: Negative for chills, diaphoresis, fatigue and fever.   HENT: Negative for hearing loss, mouth sores, sore throat, tinnitus and trouble swallowing.    Eyes: Negative for pain, discharge and redness.   Respiratory: Negative for apnea, cough, choking, chest tightness, shortness of breath, wheezing and stridor.    Cardiovascular: Negative for chest pain, palpitations and leg swelling.   Gastrointestinal: Negative for abdominal distention, abdominal pain, blood in stool, constipation, diarrhea, nausea, rectal pain and vomiting.   Endocrine: Negative for cold intolerance, heat intolerance, polydipsia, polyphagia and polyuria.   Genitourinary: Negative for difficulty urinating, dysuria, flank pain, frequency, hematuria and urgency.   Musculoskeletal: Negative for arthralgias, back pain, gait problem, joint swelling, neck pain and neck stiffness.   Skin: Positive for color change and wound. Negative for rash.   Allergic/Immunologic: Negative for food allergies.   Neurological: Negative for dizziness, tremors, seizures, syncope, speech difficulty, light-headedness and headaches.   Hematological: Negative for adenopathy. Does not bruise/bleed easily.   Psychiatric/Behavioral: Negative for agitation and confusion. The patient is not nervous/anxious.    All other systems reviewed and are negative.    Objective:     Vital Signs (Most Recent):  Temp: 98.2 °F (36.8 °C) (11/08/18 1148)  Pulse: 73 (11/08/18 1148)  Resp: 18 (11/08/18 1148)  BP: 124/81 (11/08/18 1148)  SpO2: 97 % (11/08/18 1148) Vital Signs (24h Range):  Temp:  [98.2 °F (36.8 °C)-98.9 °F (37.2 °C)] 98.2 °F (36.8 °C)  Pulse:  [71-83] 73  Resp:  [17-20] 18  SpO2:  [92 %-97 %] 97 %  BP: (121-157)/(71-81) 124/81     Weight: 99.8 kg (220 lb 0.3 oz)  Body mass index is 30.69 kg/m².    Intake/Output Summary (Last 24 hours) at  11/8/2018 1245  Last data filed at 11/8/2018 0900  Gross per 24 hour   Intake 1307.08 ml   Output 900 ml   Net 407.08 ml      Physical Exam   Constitutional: He is oriented to person, place, and time. He appears well-developed and well-nourished. No distress.   HENT:   Head: Normocephalic and atraumatic.   Mouth/Throat: Oropharynx is clear and moist.   Eyes: Conjunctivae and EOM are normal. Pupils are equal, round, and reactive to light.   Neck: Normal range of motion. Neck supple. No JVD present.   Cardiovascular: Normal rate, regular rhythm, normal heart sounds and intact distal pulses. Exam reveals no gallop and no friction rub.   No murmur heard.  Pulmonary/Chest: Effort normal and breath sounds normal. No stridor. No respiratory distress. He has no wheezes. He has no rales. He exhibits no tenderness.   Abdominal: Soft. Bowel sounds are normal. He exhibits no distension and no mass. There is no tenderness. There is no rebound and no guarding.   Musculoskeletal: Normal range of motion. He exhibits no edema or tenderness.   Neurological: He is alert and oriented to person, place, and time. No cranial nerve deficit.   Skin: Skin is warm and dry. No rash noted. He is not diaphoretic. No erythema.   LLE dressing CDI.    Psychiatric: He has a normal mood and affect. His speech is normal and behavior is normal. Judgment and thought content normal. Cognition and memory are normal.   Nursing note and vitals reviewed.      Significant Labs:   BMP:   Recent Labs   Lab 11/08/18  0453         K 4.1      CO2 23   BUN 26*   CREATININE 2.2*   CALCIUM 8.9     CBC:   Recent Labs   Lab 11/07/18 0457 11/08/18  0453   WBC 8.62 7.26   HGB 9.8* 10.3*   HCT 31.8* 32.3*    210       Significant Imaging: I have reviewed all pertinent imaging results/findings within the past 24 hours.    Assessment/Plan:      * S/P transmetatarsal amputation of foot, left    - with delayed wound healing secondary to  non-compliance with podiatry recommendations (patient reports getting foot wet and bearing weight)  - Defer care to podiatry  -wound cultures growing Enterobacter and Enterococcus  -scheduled for wound closure on Monday 11/5/18  -Infectious Disease following  -will need LTAC vs SNF  -Will continue zyvox and zosyn   11/4/18  -Surgery planned for tomorrow  -ID following  -IV zosyn discontinued   -Continue Zyvox po and IV Rocephin added   11/5/18  -Awaiting surgery for wound closure   11/6/18  -S/p Left foot Secondary Wound Closure and debridement of Bone/Transmetatarsal Amputation by Dr. Wheeler.   -Patient spike fever of 101.4F, IS encouraged   - CXR pending   -Social work consult for discharge planning (SNF vs LTAC). Patient will need IV abx for 6 weeks   11/7/18  -CXR showed no acute findings   -WBCs remain negative, no fever in 24 hours   -hypotension today, but is afebrile, and WBC is not high  - Likely not septic, will monitor closely.  11/8/18  -Continue current plan      Immunosuppression    Hx of kidney transplant   Continue prednisone and Prograf per Nephrology  Prograf level 7.0 on 11/7/18       Hx of right BKA    - Site is intact, well healed   -No s/s of infection          Non compliance with medical treatment    - Educated on the importance of adhering to medical treatment plan  - Patient has a limited support system  - Social work consult for DC planning (SNF vs LTAC)       Abscess of left foot    See plan for s/p transmetatarsal amputation of foot  Wound cultures grew VRE and enterobacter  ID following   Zyvox po and IV Rocephin .  Will follow anaerobic cultures  Bactrim added for stenotrophomonas by Dr. Veliz   Continue current treatment   Continue wound care        Peripheral vascular disease    - Resume home ASA   - Vascular f/u as needed       Chronic bilateral low back pain with left-sided sciatica    - Resume home Norco PRN  - Monitor  -Pain controlled  -Stable      Kidney transplant recipient     - patient is noted to be a poor historian and does not know what medications he takes at home  - per d/w Edmundo Hernandez today, he is currently prescribed Prednisone 5 mg daily, Cellcept 500 mg BID, and Prograf 1.5 mg BID  - Nephrology consulted  - Continue home Prednisone and Prograf for now  - Hold Cellcept per Dr. Staley  - Monitor Prograf levels   - Followed by Dr. Blanco in [place]   -Continue current medical management plan   -Nephrology following closely    -Creatinine increased to 2.6 most likely due to hypotension   -IVFs        Acute renal failure superimposed on stage 3 chronic kidney disease    -Creatinine 1.8 today. Slight increase. Closely monitor  - Daily BMP  - Monitor  -Creatinine 1.5 at baseline   -Creatinine stable  -Nephrology following   -Remains stable   -No change  11/7/18  -Creatinine 2.6, most likely 2/2 hypotension   -Bp medications adjusted   -IVFs   11/8/18  -Improving Creatinine currently 2.2  -Continue gentle IV hydration        Chronic obstructive pulmonary disease    - Currently appears compensated  - Continue home meds  - Supplemental O2 PRN  - Monitor  -No change      Coronary artery disease involving native coronary artery of native heart without angina pectoris    -Continue Cardiac medications  -Cardiac monitoring  -Stable        Type 2 diabetes mellitus with hyperglycemia, with long-term current use of insulin    - A1c 8.0 in September  -Glucose slightly uncontrolled  - Accu checks ACHS with SSI PRN  - add NPH at lower doses  - Monitor  -SSI dose increased to moderate   -Continue to monitor adjust insulin accordingly        Essential hypertension    - BP well controlled  - Continue home Norvasc and Metoprolol  - Monitor  -Blood pressure needing better control, bidil added   -Lisinopril added   -BP stabilized   11/7/18  -Was previously elevated   -Bidil and Lisinopril stopped and amlodipine dose decreased to 2.5 daily   -Bp still marginally low, IVFs   -Monitor   11/8/18  -Blood  pressure improved   -Will continue to hold ACEI        VTE Risk Mitigation (From admission, onward)        Ordered     IP VTE HIGH RISK PATIENT  Once      11/05/18 1555     Place sequential compression device  Until discontinued      11/05/18 1555              Carla Irby NP  Department of Hospital Medicine   [facility]

## 2018-11-08 NOTE — ASSESSMENT & PLAN NOTE
- with delayed wound healing secondary to non-compliance with podiatry recommendations (patient reports getting foot wet and bearing weight)  - Defer care to podiatry  -wound cultures growing Enterobacter and Enterococcus  -scheduled for wound closure on Monday 11/5/18  -Infectious Disease following  -will need LTAC vs SNF  -Will continue zyvox and zosyn   11/4/18  -Surgery planned for tomorrow  -ID following  -IV zosyn discontinued   -Continue Zyvox po and IV Rocephin added   11/5/18  -Awaiting surgery for wound closure   11/6/18  -S/p Left foot Secondary Wound Closure and debridement of Bone/Transmetatarsal Amputation by Dr. Wheeler.   -Patient spike fever of 101.4F, IS encouraged   - CXR pending   -Social work consult for discharge planning (SNF vs LTAC). Patient will need IV abx for 6 weeks   11/7/18  -CXR showed no acute findings   -WBCs remain negative, no fever in 24 hours   -hypotension today, but is afebrile, and WBC is not high  - Likely not septic, will monitor closely.  11/8/18  -Continue current plan

## 2018-11-08 NOTE — PLAN OF CARE
Problem: Patient Care Overview  Goal: Plan of Care Review  Outcome: Ongoing (interventions implemented as appropriate)  Plan of care reviewed and discussed with patient.  No acute distress noted.  Safety and fall precautions in place.  Patient free from injury.  Oral hydration promoted.  IV hydration and contact precautions maintained.  Pain managed adequately.  Will continue to monitor.    12 hour chart check complete.

## 2018-11-08 NOTE — ASSESSMENT & PLAN NOTE
- BP well controlled  - Continue home Norvasc and Metoprolol  - Monitor  -Blood pressure needing better control, bidil added   -Lisinopril added   -BP stabilized   11/7/18  -Was previously elevated   -Bidil and Lisinopril stopped and amlodipine dose decreased to 2.5 daily   -Bp still marginally low, IVFs   -Monitor   11/8/18  -Blood pressure improved   -Will continue to hold ACEI

## 2018-11-08 NOTE — PLAN OF CARE
Problem: Patient Care Overview  Goal: Plan of Care Review  Outcome: Ongoing (interventions implemented as appropriate)  Patient remained free from injury. Cardiac monitored-NSR;blood glucose monitored. Refused IS. No s/s of acute distress. 12hr chart check complete.

## 2018-11-08 NOTE — PROGRESS NOTES
[facility] -   PODIATRIC MEDICINE AND SURGERY  PROGRESS NOTE    Admission Date: 10/31/2018  LOS: 8      SUBJECTIVE  Pt seen at bedside this PM. Pt is s/p Left foot Secondary Wound Closure + Debridement of Bone/Transmetatarsal Amputation, DOS: 2018 Relates pain is well controlled. Awaiting SNF placement. Hypotensive episodes monitored by hospital medicine team. CT chest negative for acute findings. Currently on IVFs with improvement in renal fxn.     OBJECTIVE    Temp (24hrs), Av.6 °F (37 °C), Min:98.2 °F (36.8 °C), Max:98.9 °F (37.2 °C)    Vitals:    18 0712 18 0750 18 0844 18 1157   BP: (!) 109/58  (!) 100/53 (!) 115/58   Pulse: 76 78  88   Resp: 16 20  17   Temp: 98.8 °F (37.1 °C)   99.1 °F (37.3 °C)   TempSrc: Oral   Oral   SpO2: 95% (!) 94%  (!) 88%   Weight:       Height:        18 1727 18 1900 18 2001 18 2139   BP: 121/75   137/71   Pulse: 83 77 76 80   Resp: 18 18 18 18   Temp: 98.9 °F (37.2 °C)   98.3 °F (36.8 °C)   TempSrc: Oral   Oral   SpO2: (!) 92% 95% (!) 94% (!) 93%   Weight:       Height:        18 2145 18 2334 18 0401 18 0410   BP:  129/74 125/73    Pulse: 82 71 75 79   Resp:  20 18    Temp:  98.6 °F (37 °C) 98.7 °F (37.1 °C)    TempSrc:  Oral Oral    SpO2:  (!) 94% (!) 94%    Weight:       Height:        18 0730 18 0847 18 1148   BP:  (!) 157/72 124/81   Pulse:  72 73   Resp:  17 18   Temp:  98.8 °F (37.1 °C) 98.2 °F (36.8 °C)   TempSrc:  Oral Oral   SpO2: (!) 94% 95% 97%   Weight:      Height:              GENERAL  - Pleasant male with no apparent distress    LOWER EXTREMITY PHYSICAL EXAM    Neurovascular status - vascular status grossly intact.   Surgical incision well coapted with sutures/staples  Pins - N/A   Erythema - distal stump  Edema - mild  Warmth - no increase in skin temp from prox to distally b/l   TTP - mild at the incision site consistent with po course    Radiographs  reviewed:  -post op.      SELECTED RESULTS  Recent Labs     11/06/18 0435 11/07/18 0457 11/08/18  0453   WBC 7.15 8.62 7.26   HGB 11.0* 9.8* 10.3*   HCT 35.4* 31.8* 32.3*   MCV 92 92 91    205 210   LYMPH 25.5  1.8 26.7  2.3 21.3  1.6   MONO 8.1  0.6 6.4  0.6 7.6  0.6   EOS 0.1 0.1 0.1     Recent Labs     11/06/18 0435 11/07/18 0457 11/08/18 0453    134* 137   K 4.3 4.1 4.1    101 104   CO2 22* 23 23   BUN 21 31* 26*     Lab Results   Component Value Date     11/08/2018    K 4.1 11/08/2018     11/08/2018    CO2 23 11/08/2018     Lab Results   Component Value Date    WBC 7.26 11/08/2018    HGB 10.3 (L) 11/08/2018    HCT 32.3 (L) 11/08/2018    MCV 91 11/08/2018     11/08/2018      [unfilled]  Lab Results   Component Value Date    SEDRATE 14 (H) 10/31/2018     Lab Results   Component Value Date    CRP 1.2 10/31/2018     Lab Results   Component Value Date    HGBA1C 8.0 (H) 09/19/2018       ASSESSMENT  Lavelle Ladd is a 63 y.o. male s/p Left foot Secondary Wound Closure + Debridement of Bone/Transmetatarsal Amputation, DOS: 11/5/2018    PLAN  Dressing change performed and splint reapplication  Transfer SNF awaiting placement   Pt has history of NON COMPLIANCE and inability to remain NWB at home or comply with home care with dressing  He will need IV antibiotics at least 2 weeks to monitor wound for further necrosis/ and/or infection  He is unable to comply with NWB instructions at home. He has adamantly declined LTAC in the past, and then tried to get admitted after he was discharged  He has a high probability of wound failure requiring readmission to hospital with further amputation- Leg if he does not have close monitoring and follow up.    Nursing dressing orders:  1. Change surgical dressings every 3 days: adaptic, betadine along incision line, kerlix, cast padding, and application of posterior splint. If any signs of necrosis, contact MD.  2. Pt to remain NWB  to operative extremity. Will benefit from physical therapy with gait training- NWB to operative extremity  3. Follow up in my outpatient clinic in 2 weeks       Report Electronically Signed By:    Karla Wheeler DPM   Podiatric Medicine & Surgery  [facility]

## 2018-11-08 NOTE — ASSESSMENT & PLAN NOTE
64 y/o male with a h/o of KTx has wound infection after amputation:                  Kidney transplant recipient     CKD stage 3. Noted s Cr higher today, c/w DINORAH on CKD  Likely cause may be hypotension  No diarrhea reported  No urinary issues  K normal  Metabolic acidosis, mild, stable, improved  Hyponatremia, had been corrected, has reoccurred, due to renal failure     H/o of cadaveric kidney transplant in May 2016  On immunosuppressive therapy  Last prograf level was within the therapeutic range  Repeat prograf level within the therapeutic range  No change in dose of prograf, continue same dose  Cellcept is on hold, keep on hold, due to current and active infection     HTN : BP was controlled to low  Was previously high  Meds reviewed  Will hold ACE-I and lower the dose of amlodipine     H/o of DM  Diabetic nephropathy               * Abscess of left foot     Left foot wound infection post mid foot amputation  Wound cx growing Enterococcus and Enterobacter  Has VRE  Contact precaution  Has hypotension today, but is afebrile, and WBC is not high  Blood cx's from 10/31/18 negative  Likely not septic, dipesh will monitor closely.  Abx reviewed   Continue zosyn and zyvox   S/p surgery and wound closure 2 days ago            Plans and recommendations:  As discussed above  Will f/u closely.  Send urine Na and Cr to calculate FENa  D/c lisinopril  Reduce amlodipine dose from 10 to 2.5 mg po qd  Repeat labs  Agree withgentle IVF rehydration  Will repeat blood cx x 2

## 2018-11-08 NOTE — PLAN OF CARE
CM received declines form all in network SNF facilities: [facility], [facility], and [facility].      PAOLA left a message for Mirtha with [facility] to find out if they would approve another facility.      PAOLA made a referral to [facility] for review in case [facility] allows a referral outside of the in network facilities.      Awaiting a call back from Mattie with [facility]

## 2018-11-08 NOTE — PROGRESS NOTES
"[facility]  Nephrology  Progress Note    Patient Name: Lavelle Ladd  MRN: [id number]  Admission Date: 10/31/2018  Hospital Length of Stay: 8 days  Attending Provider: Ruma Rand MD   Primary Care Physician: Rishabh Esteban MD  Principal Problem:S/P transmetatarsal amputation of foot, left    Subjective:     HPI: Lavelle Ladd is a pleasant 63 y old  man  , s/p   donor ( brain death ) kidney transplant on 16.  He has CKD stage 3 - GFR 30-59 and his baseline creatinine is between 1.5-1.8. He takes  prednisone 5 mg daily and tacrolimus 1.5 mg twice a day , for maintenance immunosuppression.  He he is also on CellCept 500 mg twice a day, will hold this medication due to cellulitis of his left foot, he is status post left transmetatarsal amputation on 2018, patient was seen for follow-up appointment in the clinic yesterday and notice that his foot was infected, patient was admitted to the hospital for further management,      Interval History: Pt was seen and examined. No new c/o's, no new events, noted s Cr has improved, BP is not as low, pt feels "fine", no fever, no SOB, no abd pain.    Review of patient's allergies indicates:  No Known Allergies  Current Facility-Administered Medications   Medication Frequency    0.9%  NaCl infusion Continuous    acetaminophen tablet 650 mg Q6H PRN    amLODIPine tablet 2.5 mg Daily    arformoterol nebulizer solution 15 mcg Q12H    aspirin EC tablet 81 mg Daily    benzonatate capsule 100 mg TID PRN    budesonide nebulizer solution 0.5 mg Q12H    dextrose 50% injection 12.5 g PRN    dextrose 50% injection 25 g PRN    gabapentin capsule 300 mg TID    glucagon (human recombinant) injection 1 mg PRN    glucose chewable tablet 16 g PRN    glucose chewable tablet 24 g PRN    HYDROcodone-acetaminophen 5-325 mg per tablet 1 tablet Q6H PRN    insulin aspart U-100 pen 1-10 Units QID (AC + HS) PRN    insulin NPH injection " 20 Units BID AC    levoFLOXacin tablet 750 mg Every other day    linezolid tablet 600 mg Q12H    metoclopramide HCl injection 10 mg Q10 Min PRN    metoprolol tartrate (LOPRESSOR) tablet 25 mg BID    ondansetron disintegrating tablet 8 mg Q8H PRN    ondansetron injection 4 mg Q8H PRN    predniSONE tablet 5 mg Daily    sodium bicarbonate tablet 2,600 mg BID    sodium chloride 0.9% flush 3 mL PRN    tacrolimus capsule 1.5 mg BID       Objective:     Vital Signs (Most Recent):  Temp: 98.8 °F (37.1 °C) (11/08/18 0847)  Pulse: 72 (11/08/18 0847)  Resp: 17 (11/08/18 0847)  BP: (!) 157/72 (11/08/18 0847)  SpO2: 95 % (11/08/18 0847)  O2 Device (Oxygen Therapy): room air (11/08/18 0847) Vital Signs (24h Range):  Temp:  [98.3 °F (36.8 °C)-99.1 °F (37.3 °C)] 98.8 °F (37.1 °C)  Pulse:  [71-88] 72  Resp:  [17-20] 17  SpO2:  [88 %-95 %] 95 %  BP: (115-157)/(58-75) 157/72     Weight: 99.8 kg (220 lb 0.3 oz) (10/31/18 1049)  Body mass index is 30.69 kg/m².  Body surface area is 2.24 meters squared.    I/O last 3 completed shifts:  In: 1697.1 [P.O.:720; I.V.:927.1; IV Piggyback:50]  Out: 700 [Urine:700]    Physical Exam   Constitutional: He is oriented to person, place, and time. He appears well-developed and well-nourished.   HENT:   Head: Normocephalic and atraumatic.   Neck: No JVD present.   Cardiovascular: Normal rate, regular rhythm and normal heart sounds.   Pulmonary/Chest: No respiratory distress. He has no rales.   Abdominal: Soft. He exhibits no distension.   Musculoskeletal: He exhibits no edema.   Neurological: He is oriented to person, place, and time.   Skin: Skin is dry.   Psychiatric: He has a normal mood and affect. His behavior is normal.   Nursing note and vitals reviewed.      Significant Labs: reviewed  BMP  Lab Results   Component Value Date     11/08/2018    K 4.1 11/08/2018     11/08/2018    CO2 23 11/08/2018    BUN 26 (H) 11/08/2018    CREATININE 2.2 (H) 11/08/2018    CALCIUM 8.9  11/08/2018    ANIONGAP 10 11/08/2018    ESTGFRAFRICA 35 (A) 11/08/2018    EGFRNONAA 30 (A) 11/08/2018     Lab Results   Component Value Date    WBC 7.26 11/08/2018    HGB 10.3 (L) 11/08/2018    HCT 32.3 (L) 11/08/2018    MCV 91 11/08/2018     11/08/2018     Blood cx x 2 neg    Assessment/Plan:     Kidney transplant recipient    62 y/o male with a h/o of KTx has wound infection after amputation:                  Kidney transplant recipient     CKD stage 3. DINORAH on CKD  s Cr already improving after adjustment in BP meds  Likely cause was hypotension  No diarrhea reported  No urinary issues  Blood cx's neg  K normal  Metabolic acidosis, mild, stable, improved  Hyponatremia, improved, s Na normal     H/o of cadaveric kidney transplant in May 2016  On immunosuppressive therapy  Last prograf level within the therapeutic range  No change in dose of prograf, continue same dose  Cellcept is on hold, keep on hold, due to current and active infection     HTN : BP was low yesterday  Improved, slightly elevated today  Meds reveiwed  Will reduce NS IVF's rate  ACE-I is still on hold     H/o of DM  Diabetic nephropathy               * Abscess of left foot     Left foot wound infection post mid foot amputation  Wound cx growing Enterococcus and Enterobacter  Has VRE  Contact precaution  Blood cx's neg  Abx reviewed   Continue zosyn and zyvox   S/p surgery and wound closure 3 days ago            Plans and recommendations:  As discussed above  Continue IVF rehydration, but at a lower rate, 50-75 ml/hr                 Maureen Cherry MD  Nephrology  [facility]

## 2018-11-08 NOTE — SUBJECTIVE & OBJECTIVE
"Interval History: Pt was seen and examined. No new c/o's, no new events, noted s Cr has improved, BP is not as low, pt feels "fine", no fever, no SOB, no abd pain.    Review of patient's allergies indicates:  No Known Allergies  Current Facility-Administered Medications   Medication Frequency    0.9%  NaCl infusion Continuous    acetaminophen tablet 650 mg Q6H PRN    amLODIPine tablet 2.5 mg Daily    arformoterol nebulizer solution 15 mcg Q12H    aspirin EC tablet 81 mg Daily    benzonatate capsule 100 mg TID PRN    budesonide nebulizer solution 0.5 mg Q12H    dextrose 50% injection 12.5 g PRN    dextrose 50% injection 25 g PRN    gabapentin capsule 300 mg TID    glucagon (human recombinant) injection 1 mg PRN    glucose chewable tablet 16 g PRN    glucose chewable tablet 24 g PRN    HYDROcodone-acetaminophen 5-325 mg per tablet 1 tablet Q6H PRN    insulin aspart U-100 pen 1-10 Units QID (AC + HS) PRN    insulin NPH injection 20 Units BID AC    levoFLOXacin tablet 750 mg Every other day    linezolid tablet 600 mg Q12H    metoclopramide HCl injection 10 mg Q10 Min PRN    metoprolol tartrate (LOPRESSOR) tablet 25 mg BID    ondansetron disintegrating tablet 8 mg Q8H PRN    ondansetron injection 4 mg Q8H PRN    predniSONE tablet 5 mg Daily    sodium bicarbonate tablet 2,600 mg BID    sodium chloride 0.9% flush 3 mL PRN    tacrolimus capsule 1.5 mg BID       Objective:     Vital Signs (Most Recent):  Temp: 98.8 °F (37.1 °C) (11/08/18 0847)  Pulse: 72 (11/08/18 0847)  Resp: 17 (11/08/18 0847)  BP: (!) 157/72 (11/08/18 0847)  SpO2: 95 % (11/08/18 0847)  O2 Device (Oxygen Therapy): room air (11/08/18 0847) Vital Signs (24h Range):  Temp:  [98.3 °F (36.8 °C)-99.1 °F (37.3 °C)] 98.8 °F (37.1 °C)  Pulse:  [71-88] 72  Resp:  [17-20] 17  SpO2:  [88 %-95 %] 95 %  BP: (115-157)/(58-75) 157/72     Weight: 99.8 kg (220 lb 0.3 oz) (10/31/18 1049)  Body mass index is 30.69 kg/m².  Body surface area is 2.24 " meters squared.    I/O last 3 completed shifts:  In: 1697.1 [P.O.:720; I.V.:927.1; IV Piggyback:50]  Out: 700 [Urine:700]    Physical Exam   Constitutional: He is oriented to person, place, and time. He appears well-developed and well-nourished.   HENT:   Head: Normocephalic and atraumatic.   Neck: No JVD present.   Cardiovascular: Normal rate, regular rhythm and normal heart sounds.   Pulmonary/Chest: No respiratory distress. He has no rales.   Abdominal: Soft. He exhibits no distension.   Musculoskeletal: He exhibits no edema.   Neurological: He is oriented to person, place, and time.   Skin: Skin is dry.   Psychiatric: He has a normal mood and affect. His behavior is normal.   Nursing note and vitals reviewed.      Significant Labs: reviewed  BMP  Lab Results   Component Value Date     11/08/2018    K 4.1 11/08/2018     11/08/2018    CO2 23 11/08/2018    BUN 26 (H) 11/08/2018    CREATININE 2.2 (H) 11/08/2018    CALCIUM 8.9 11/08/2018    ANIONGAP 10 11/08/2018    ESTGFRAFRICA 35 (A) 11/08/2018    EGFRNONAA 30 (A) 11/08/2018     Lab Results   Component Value Date    WBC 7.26 11/08/2018    HGB 10.3 (L) 11/08/2018    HCT 32.3 (L) 11/08/2018    MCV 91 11/08/2018     11/08/2018     Blood cx x 2 neg

## 2018-11-09 LAB
ALBUMIN SERPL BCP-MCNC: 2.4 G/DL
ANION GAP SERPL CALC-SCNC: 6 MMOL/L
ANION GAP SERPL CALC-SCNC: 9 MMOL/L
BASOPHILS # BLD AUTO: 0.01 K/UL
BASOPHILS # BLD AUTO: 0.01 K/UL
BASOPHILS NFR BLD: 0.2 %
BASOPHILS NFR BLD: 0.2 %
BUN SERPL-MCNC: 21 MG/DL
BUN SERPL-MCNC: 25 MG/DL
CALCIUM SERPL-MCNC: 9.3 MG/DL
CALCIUM SERPL-MCNC: 9.7 MG/DL
CHLORIDE SERPL-SCNC: 105 MMOL/L
CHLORIDE SERPL-SCNC: 106 MMOL/L
CO2 SERPL-SCNC: 23 MMOL/L
CO2 SERPL-SCNC: 23 MMOL/L
CREAT SERPL-MCNC: 1.8 MG/DL
CREAT SERPL-MCNC: 1.9 MG/DL
DIFFERENTIAL METHOD: ABNORMAL
DIFFERENTIAL METHOD: ABNORMAL
EOSINOPHIL # BLD AUTO: 0 K/UL
EOSINOPHIL # BLD AUTO: 0.2 K/UL
EOSINOPHIL NFR BLD: 0.5 %
EOSINOPHIL NFR BLD: 3 %
ERYTHROCYTE [DISTWIDTH] IN BLOOD BY AUTOMATED COUNT: 16.2 %
ERYTHROCYTE [DISTWIDTH] IN BLOOD BY AUTOMATED COUNT: 16.3 %
EST. GFR  (AFRICAN AMERICAN): 42 ML/MIN/1.73 M^2
EST. GFR  (AFRICAN AMERICAN): 45 ML/MIN/1.73 M^2
EST. GFR  (NON AFRICAN AMERICAN): 36 ML/MIN/1.73 M^2
EST. GFR  (NON AFRICAN AMERICAN): 39 ML/MIN/1.73 M^2
GLUCOSE SERPL-MCNC: 133 MG/DL
GLUCOSE SERPL-MCNC: 166 MG/DL
HCT VFR BLD AUTO: 32.7 %
HCT VFR BLD AUTO: 34.8 %
HGB BLD-MCNC: 10.1 G/DL
HGB BLD-MCNC: 10.9 G/DL
LYMPHOCYTES # BLD AUTO: 1.5 K/UL
LYMPHOCYTES # BLD AUTO: 1.7 K/UL
LYMPHOCYTES NFR BLD: 26.4 %
LYMPHOCYTES NFR BLD: 26.6 %
MAGNESIUM SERPL-MCNC: 1.7 MG/DL
MCH RBC QN AUTO: 28.5 PG
MCH RBC QN AUTO: 28.6 PG
MCHC RBC AUTO-ENTMCNC: 30.9 G/DL
MCHC RBC AUTO-ENTMCNC: 31.3 G/DL
MCV RBC AUTO: 91 FL
MCV RBC AUTO: 93 FL
MONOCYTES # BLD AUTO: 0.2 K/UL
MONOCYTES # BLD AUTO: 0.5 K/UL
MONOCYTES NFR BLD: 4.3 %
MONOCYTES NFR BLD: 7.2 %
NEUTROPHILS # BLD AUTO: 3.8 K/UL
NEUTROPHILS # BLD AUTO: 4 K/UL
NEUTROPHILS NFR BLD: 63 %
NEUTROPHILS NFR BLD: 68.6 %
PHOSPHATE SERPL-MCNC: 2.6 MG/DL
PHOSPHATE SERPL-MCNC: 2.6 MG/DL
PLATELET # BLD AUTO: 208 K/UL
PLATELET # BLD AUTO: 241 K/UL
PMV BLD AUTO: 9 FL
PMV BLD AUTO: 9.5 FL
POCT GLUCOSE: 112 MG/DL (ref 70–110)
POCT GLUCOSE: 129 MG/DL (ref 70–110)
POCT GLUCOSE: 144 MG/DL (ref 70–110)
POCT GLUCOSE: 151 MG/DL (ref 70–110)
POCT GLUCOSE: 167 MG/DL (ref 70–110)
POTASSIUM SERPL-SCNC: 4.7 MMOL/L
POTASSIUM SERPL-SCNC: 5.7 MMOL/L
RBC # BLD AUTO: 3.53 M/UL
RBC # BLD AUTO: 3.82 M/UL
SODIUM SERPL-SCNC: 135 MMOL/L
SODIUM SERPL-SCNC: 137 MMOL/L
WBC # BLD AUTO: 5.56 K/UL
WBC # BLD AUTO: 6.27 K/UL

## 2018-11-09 PROCEDURE — 25000003 PHARM REV CODE 250: Mod: HCNC | Performed by: NURSE PRACTITIONER

## 2018-11-09 PROCEDURE — 25000003 PHARM REV CODE 250: Mod: HCNC | Performed by: FAMILY MEDICINE

## 2018-11-09 PROCEDURE — 25000003 PHARM REV CODE 250: Mod: HCNC | Performed by: INTERNAL MEDICINE

## 2018-11-09 PROCEDURE — 94761 N-INVAS EAR/PLS OXIMETRY MLT: CPT | Mod: HCNC

## 2018-11-09 PROCEDURE — 93005 ELECTROCARDIOGRAM TRACING: CPT | Mod: HCNC

## 2018-11-09 PROCEDURE — 99900035 HC TECH TIME PER 15 MIN (STAT): Mod: HCNC

## 2018-11-09 PROCEDURE — 25000003 PHARM REV CODE 250: Mod: HCNC | Performed by: PODIATRIST

## 2018-11-09 PROCEDURE — 21400001 HC TELEMETRY ROOM: Mod: HCNC

## 2018-11-09 PROCEDURE — 25000242 PHARM REV CODE 250 ALT 637 W/ HCPCS: Mod: HCNC | Performed by: PODIATRIST

## 2018-11-09 PROCEDURE — 80048 BASIC METABOLIC PNL TOTAL CA: CPT | Mod: HCNC

## 2018-11-09 PROCEDURE — 36415 COLL VENOUS BLD VENIPUNCTURE: CPT | Mod: HCNC

## 2018-11-09 PROCEDURE — 93010 ELECTROCARDIOGRAM REPORT: CPT | Mod: HCNC,,, | Performed by: INTERNAL MEDICINE

## 2018-11-09 PROCEDURE — 96372 THER/PROPH/DIAG INJ SC/IM: CPT | Mod: HCNC

## 2018-11-09 PROCEDURE — 83735 ASSAY OF MAGNESIUM: CPT | Mod: HCNC

## 2018-11-09 PROCEDURE — 94640 AIRWAY INHALATION TREATMENT: CPT | Mod: HCNC

## 2018-11-09 PROCEDURE — 80069 RENAL FUNCTION PANEL: CPT | Mod: HCNC

## 2018-11-09 PROCEDURE — 63600175 PHARM REV CODE 636 W HCPCS: Mod: HCNC | Performed by: INTERNAL MEDICINE

## 2018-11-09 PROCEDURE — 97802 MEDICAL NUTRITION INDIV IN: CPT | Mod: HCNC

## 2018-11-09 PROCEDURE — 85025 COMPLETE CBC W/AUTO DIFF WBC: CPT | Mod: 91,HCNC

## 2018-11-09 PROCEDURE — 80197 ASSAY OF TACROLIMUS: CPT | Mod: HCNC

## 2018-11-09 PROCEDURE — 63600175 PHARM REV CODE 636 W HCPCS: Mod: HCNC | Performed by: NURSE PRACTITIONER

## 2018-11-09 RX ORDER — METOPROLOL TARTRATE 25 MG/1
25 TABLET, FILM COATED ORAL 2 TIMES DAILY
Status: DISCONTINUED | OUTPATIENT
Start: 2018-11-10 | End: 2018-11-13 | Stop reason: HOSPADM

## 2018-11-09 RX ORDER — AMLODIPINE BESYLATE 10 MG/1
10 TABLET ORAL DAILY
Status: DISCONTINUED | OUTPATIENT
Start: 2018-11-09 | End: 2018-11-09

## 2018-11-09 RX ORDER — AMLODIPINE BESYLATE 10 MG/1
10 TABLET ORAL DAILY
Status: DISCONTINUED | OUTPATIENT
Start: 2018-11-09 | End: 2018-11-13 | Stop reason: HOSPADM

## 2018-11-09 RX ADMIN — ASPIRIN 81 MG: 81 TABLET, COATED ORAL at 08:11

## 2018-11-09 RX ADMIN — GABAPENTIN 300 MG: 300 CAPSULE ORAL at 09:11

## 2018-11-09 RX ADMIN — PREDNISONE 5 MG: 5 TABLET ORAL at 08:11

## 2018-11-09 RX ADMIN — BUDESONIDE 0.5 MG: 0.5 SUSPENSION RESPIRATORY (INHALATION) at 09:11

## 2018-11-09 RX ADMIN — ONDANSETRON 8 MG: 8 TABLET, ORALLY DISINTEGRATING ORAL at 02:11

## 2018-11-09 RX ADMIN — ARFORMOTEROL TARTRATE 15 MCG: 15 SOLUTION RESPIRATORY (INHALATION) at 08:11

## 2018-11-09 RX ADMIN — GABAPENTIN 300 MG: 300 CAPSULE ORAL at 08:11

## 2018-11-09 RX ADMIN — GABAPENTIN 300 MG: 300 CAPSULE ORAL at 02:11

## 2018-11-09 RX ADMIN — HYDROCODONE BITARTRATE AND ACETAMINOPHEN 1 TABLET: 5; 325 TABLET ORAL at 02:11

## 2018-11-09 RX ADMIN — TACROLIMUS 1.5 MG: 0.5 CAPSULE ORAL at 08:11

## 2018-11-09 RX ADMIN — METOPROLOL TARTRATE 25 MG: 25 TABLET ORAL at 08:11

## 2018-11-09 RX ADMIN — SODIUM BICARBONATE 650 MG TABLET 2600 MG: at 09:11

## 2018-11-09 RX ADMIN — AMLODIPINE BESYLATE 10 MG: 10 TABLET ORAL at 08:11

## 2018-11-09 RX ADMIN — LINEZOLID 600 MG: 600 TABLET, FILM COATED ORAL at 08:11

## 2018-11-09 RX ADMIN — HUMAN INSULIN 20 UNITS: 100 INJECTION, SUSPENSION SUBCUTANEOUS at 06:11

## 2018-11-09 RX ADMIN — LEVOFLOXACIN 750 MG: 750 TABLET, FILM COATED ORAL at 09:11

## 2018-11-09 RX ADMIN — TACROLIMUS 1.5 MG: 0.5 CAPSULE ORAL at 05:11

## 2018-11-09 RX ADMIN — HYDROCODONE BITARTRATE AND ACETAMINOPHEN 1 TABLET: 5; 325 TABLET ORAL at 04:11

## 2018-11-09 RX ADMIN — AMLODIPINE BESYLATE 10 MG: 10 TABLET ORAL at 12:11

## 2018-11-09 RX ADMIN — HUMAN INSULIN 20 UNITS: 100 INJECTION, SUSPENSION SUBCUTANEOUS at 05:11

## 2018-11-09 RX ADMIN — HYDROCODONE BITARTRATE AND ACETAMINOPHEN 1 TABLET: 5; 325 TABLET ORAL at 10:11

## 2018-11-09 RX ADMIN — ARFORMOTEROL TARTRATE 15 MCG: 15 SOLUTION RESPIRATORY (INHALATION) at 09:11

## 2018-11-09 RX ADMIN — BUDESONIDE 0.5 MG: 0.5 SUSPENSION RESPIRATORY (INHALATION) at 08:11

## 2018-11-09 RX ADMIN — SODIUM CHLORIDE: 0.9 INJECTION, SOLUTION INTRAVENOUS at 08:11

## 2018-11-09 NOTE — PROGRESS NOTES
[facility] -   Adult Nutrition  Consult Note    SUMMARY     Recommendations    Recommendations/Intervention: 1. Add cardiac restriction to current diet. 2. Boost Glucose Control BID. 3. Provided diabetic diet education w/ handout from the Nutrition Care Manual. Pt verbalized understanding, all concerns addressed. RD contact info provided. 4. Will continue to monitor.   Goals: Meet >85% EEN/EPN  Nutrition Goal Status: new   Communication of RD recs: POc, sticky note    Reason for Assessment  Dx:  1. Peripheral vascular disease    2. Diabetic foot infection    3. Gangrene of left foot    4. Abscess of left foot    5. S/P transmetatarsal amputation of foot, left    6. Cellulitis of left foot    7. Essential hypertension    8. Hx of right BKA    9. Kidney transplant recipient    10. Immunosuppression    11. Encounter for medication review and counseling    12. Stage 3 chronic kidney disease    13. DINORAH (acute kidney injury)      Hx:  Past Medical History:   Diagnosis Date    DINORAH (acute kidney injury) 2016    Arthritis     Chronic obstructive pulmonary disease 2016    Coronary artery disease involving native coronary artery of native heart without angina pectoris 2016     donor kidney transplant for DM 16     Induction with Thymo x3 and IV solumedrol to total 875mg  Kidney Biopsy  2016: 16 glomeruli, ACR type 1 AVR type 2, significant microcirculatory changes, c4d negative, No DSA, 5 to10% fibrosis. Treated with thymo x8 2016- no rejection      Diastolic heart failure     Encounter for blood transfusion     ESRD on RRT since 10/2013 10/29/2013    Biopsy proven diabetic nephropathy and lymphoplasmacytic interstitial infiltrate not c/w with AIN (ddx sjogrens or assoc with tamm-horsefall protein extravasation)     GERD (gastroesophageal reflux disease)     History of hepatitis C, s/p successful Rx w/ SVR12 - 2017    Completed 12 weeks harvoni w/ SVR  "   Hyperlipidemia     Hypertension     PIC line (peripherally inserted central catheter) flush     Prophylactic immunotherapy     Proteinuria     PVD (peripheral vascular disease) 6/26/2017    RLE BKA CT 12/11/16 Extensive atherosclerotic disease of the aorta and mesenteric arteries.     Renal hypertension     Type 2 diabetes mellitus with diabetic neuropathy, with long-term current use of insulin 12/1/2016    Vitamin B12 deficiency        Reason for Assessment: length of stay   General Information Comments: Pt reports good appetite and intake at home. Pt reports no wt loss. Pt consumed 50%-75% of meal today.  NFPE not peformed due to no s/s of manutrition. Noted last A1c elevated, Pt was educated on diabetic diet.   Nutrition Discharge Planning: Cardiac diabetic     Nutrition Risk Screen    Nutrition Risk Screen: no indicators present    Nutrition/Diet History    Do you have any cultural, spiritual, Mormon conflicts, given your current situation?: none    Anthropometrics    Temp: 98.2 °F (36.8 °C)  Height Method: Stated  Height: 5' 11" (180.3 cm)  Height (inches): 71 in  Weight Method: Bed Scale  Weight: 99.8 kg (220 lb 0.3 oz)  Weight (lb): 220.02 lb  Ideal Body Weight (IBW), Male: 172 lb  % Ideal Body Weight, Male (lb): 127.92 lb  BMI (Calculated): 30.8  BMI Grade: 30 - 34.9- obesity - grade I       Lab/Procedures/Meds    BMP  Lab Results   Component Value Date     11/09/2018    K 4.7 11/09/2018     11/09/2018    CO2 23 11/09/2018    BUN 21 11/09/2018    CREATININE 1.8 (H) 11/09/2018    CALCIUM 9.7 11/09/2018    ANIONGAP 9 11/09/2018    ESTGFRAFRICA 45 (A) 11/09/2018    EGFRNONAA 39 (A) 11/09/2018     Lab Results   Component Value Date    HGBA1C 8.0 (H) 09/19/2018     Lab Results   Component Value Date    ALBUMIN 3.3 (L) 10/31/2018     Lab Results   Component Value Date    CALCIUM 9.7 11/09/2018    PHOS 2.9 10/31/2018     Recent Labs   Lab 11/09/18  0656   POCTGLUCOSE 112*     Lab Results "   Component Value Date    HGBA1C 8.0 (H) 09/19/2018         Pertinent Labs Reviewed: reviewed  Pertinent Medications Reviewed: reviewed    Physical Findings/Assessment    Overall Physical Appearance: nourished  Oral/Mouth Cavity: tooth/teeth missing  Skin: incision foot     Estimated/Assessed Needs    Weight Used For Calorie Calculations: 99.8 kg (220 lb 0.3 oz)  Energy Calorie Requirements (kcal): 2495  Energy Need Method: [place]-St Jeor (x1.2)  Protein Requirements: 99 - 120 g   Weight Used For Protein Calculations: 99.8 kg (220 lb 0.3 oz)     Fluid Need Method: RDA Method  RDA Method (mL): 2495  CHO Requirement: 50%EEN      Nutrition Prescription Ordered    Current Diet Order: diabetic diet    Evaluation of Received Nutrient/Fluid Intake          Intake/Output Summary (Last 24 hours) at 11/9/2018 1249  Last data filed at 11/9/2018 0854  Gross per 24 hour   Intake 3286.67 ml   Output 2100 ml   Net 1186.67 ml       % Intake of Estimated Energy Needs: 50%-75%  % Meal Intake: 50%-75%      Nutrition Risk    Level of Risk/Frequency of Follow-up: (1xweekly)        Assessment and Plan  Nutrition Problem  Excessive carbohydrate intake     Related to (etiology):   Undesirable food choices     Signs and Symptoms (as evidenced by):   Lab Results   Component Value Date    HGBA1C 8.0 (H) 09/19/2018         Interventions/Recommendations (treatment strategy):  See above    Nutrition Diagnosis Status:   New       Monitor and Evaluation    Food and Nutrient Intake: energy intake, food and beverage intake  Food and Nutrient Adminstration: diet order  Knowledge/Beliefs/Attitudes: food and nutrition knowledge/skill  Anthropometric Measurements: weight  Nutrition-Focused Physical Findings: overall appearance, skin    Nutrition Follow-Up    RD Follow-up?: Yes

## 2018-11-09 NOTE — ASSESSMENT & PLAN NOTE
Hx of kidney transplant   Continue prednisone and Prograf per Nephrology  Prograf level 7.0 on 11/7/18 11/9/18  -Prograf level pending

## 2018-11-09 NOTE — PLAN OF CARE
Problem: Patient Care Overview  Goal: Plan of Care Review  Outcome: Ongoing (interventions implemented as appropriate)  Patient remained free from injury. No c/o pain at this time. Calm. Watching TV. No distress noted. Oriented x3. Respirations even and non labored. IV  patent and infusing.  LLE elevated on pillow.Bed locked and low. Call light in reach. Safety measures in place. Will continue to monitor. Reviewed plan of care. Patient verbalized understanding and teach back.    12hr chart check complete.

## 2018-11-09 NOTE — SUBJECTIVE & OBJECTIVE
Interval History: No acute events overnight. Awaiting placement.     Review of Systems   Constitutional: Negative for chills, diaphoresis, fatigue and fever.   HENT: Negative for hearing loss, mouth sores, sore throat, tinnitus and trouble swallowing.    Eyes: Negative for pain, discharge and redness.   Respiratory: Negative for apnea, cough, choking, chest tightness, shortness of breath, wheezing and stridor.    Cardiovascular: Negative for chest pain, palpitations and leg swelling.   Gastrointestinal: Negative for abdominal distention, abdominal pain, blood in stool, constipation, diarrhea, nausea, rectal pain and vomiting.   Endocrine: Negative for cold intolerance, heat intolerance, polydipsia, polyphagia and polyuria.   Genitourinary: Negative for difficulty urinating, dysuria, flank pain, frequency, hematuria and urgency.   Musculoskeletal: Negative for arthralgias, back pain, gait problem, joint swelling, neck pain and neck stiffness.   Skin: Positive for color change and wound. Negative for rash.   Allergic/Immunologic: Negative for food allergies.   Neurological: Negative for dizziness, tremors, seizures, syncope, speech difficulty, light-headedness and headaches.   Hematological: Negative for adenopathy. Does not bruise/bleed easily.   Psychiatric/Behavioral: Negative for agitation and confusion. The patient is not nervous/anxious.    All other systems reviewed and are negative.    Objective:     Vital Signs (Most Recent):  Temp: 98.2 °F (36.8 °C) (11/09/18 1100)  Pulse: 79 (11/09/18 0859)  Resp: 16 (11/09/18 0859)  BP: 113/64 (11/09/18 0852)  SpO2: 100 % (11/09/18 0859) Vital Signs (24h Range):  Temp:  [98 °F (36.7 °C)-98.6 °F (37 °C)] 98.2 °F (36.8 °C)  Pulse:  [68-88] 79  Resp:  [16-18] 16  SpO2:  [94 %-100 %] 100 %  BP: (113-192)/(64-87) 113/64     Weight: 99.8 kg (220 lb 0.3 oz)  Body mass index is 30.69 kg/m².    Intake/Output Summary (Last 24 hours) at 11/9/2018 4993  Last data filed at 11/9/2018  0854  Gross per 24 hour   Intake 3106.67 ml   Output 1700 ml   Net 1406.67 ml      Physical Exam   Constitutional: He is oriented to person, place, and time. He appears well-developed and well-nourished. No distress.   HENT:   Head: Normocephalic and atraumatic.   Mouth/Throat: Oropharynx is clear and moist.   Eyes: Conjunctivae and EOM are normal. Pupils are equal, round, and reactive to light.   Neck: Normal range of motion. Neck supple. No JVD present.   Cardiovascular: Normal rate, regular rhythm, normal heart sounds and intact distal pulses. Exam reveals no gallop and no friction rub.   No murmur heard.  Pulmonary/Chest: Effort normal and breath sounds normal. No stridor. No respiratory distress. He has no wheezes. He has no rales. He exhibits no tenderness.   Abdominal: Soft. Bowel sounds are normal. He exhibits no distension and no mass. There is no tenderness. There is no rebound and no guarding.   Musculoskeletal: Normal range of motion. He exhibits no edema or tenderness.   Neurological: He is alert and oriented to person, place, and time. No cranial nerve deficit.   Skin: Skin is warm and dry. No rash noted. He is not diaphoretic. No erythema.   LLE dressing CDI.    Psychiatric: He has a normal mood and affect. His speech is normal and behavior is normal. Judgment and thought content normal. Cognition and memory are normal.   Nursing note and vitals reviewed.      Significant Labs:   Blood Culture: No results for input(s): LABBLOO in the last 48 hours.  BMP:   Recent Labs   Lab 11/09/18  0444   *      K 4.7      CO2 23   BUN 21   CREATININE 1.8*   CALCIUM 9.7     CBC:   Recent Labs   Lab 11/08/18  0453 11/09/18  0444   WBC 7.26 6.27   HGB 10.3* 10.9*   HCT 32.3* 34.8*    241       Significant Imaging:

## 2018-11-09 NOTE — ASSESSMENT & PLAN NOTE
- BP well controlled  - Continue home Norvasc and Metoprolol  - Monitor  -Blood pressure needing better control, bidil added   -Lisinopril added   -BP stabilized   11/7/18  -Was previously elevated   -Bidil and Lisinopril stopped and amlodipine dose decreased to 2.5 daily   -Bp still marginally low, IVFs   -Monitor   11/8/18  -Blood pressure improved   -Will continue to hold ACEI   11/9/18  -Monitor

## 2018-11-09 NOTE — ASSESSMENT & PLAN NOTE
- with delayed wound healing secondary to non-compliance with podiatry recommendations (patient reports getting foot wet and bearing weight)  - Defer care to podiatry  -wound cultures growing Enterobacter and Enterococcus  -scheduled for wound closure on Monday 11/5/18  -Infectious Disease following  -will need LTAC vs SNF  -Will continue zyvox and zosyn   11/4/18  -Surgery planned for tomorrow  -ID following  -IV zosyn discontinued   -Continue Zyvox po and IV Rocephin added   11/5/18  -Awaiting surgery for wound closure   11/6/18  -S/p Left foot Secondary Wound Closure and debridement of Bone/Transmetatarsal Amputation by Dr. Wheeler.   -Patient spike fever of 101.4F, IS encouraged   - CXR pending   -Social work consult for discharge planning (SNF vs LTAC). Patient will need IV abx for 6 weeks   11/7/18  -CXR showed no acute findings   -WBCs remain negative, no fever in 24 hours   -hypotension today, but is afebrile, and WBC is not high  - Likely not septic, will monitor closely.  11/8/18  -Continue current plan   11/9/18  -Awaiting placement

## 2018-11-09 NOTE — ASSESSMENT & PLAN NOTE
-Creatinine 1.8 today. Slight increase. Closely monitor  - Daily BMP  - Monitor  -Creatinine 1.5 at baseline   -Creatinine stable  -Nephrology following   -Remains stable   -No change  11/7/18  -Creatinine 2.6, most likely 2/2 hypotension   -Bp medications adjusted   -IVFs   11/8/18  -Improving Creatinine currently 2.2  -Continue gentle IV hydration   11/9/18  -Creatinine 1.8

## 2018-11-09 NOTE — PLAN OF CARE
Problem: Patient Care Overview  Goal: Plan of Care Review  Outcome: Ongoing (interventions implemented as appropriate)  Recommendations/Intervention: 1. Continue current diabetic diet. 2. Add beneprotein TID and Boost Glucose Control BID for wound healing. 3. Provided diabetic diet education w/ handout from the Nutrition Care Manual. Pt verbalized understanding, all concerns addressed. RD contact info provided. 4. Will continue to monitor.   Goals: Meet >85% EEN/EPN  Nutrition Goal Status: new

## 2018-11-09 NOTE — PLAN OF CARE
Problem: Patient Care Overview  Goal: Plan of Care Review  Outcome: Ongoing (interventions implemented as appropriate)  Patient remained free from injury. Blood glucose and cardiac monitored. No s/s of acute distress. 12hr chart check complete.

## 2018-11-09 NOTE — ASSESSMENT & PLAN NOTE
- patient is noted to be a poor historian and does not know what medications he takes at home  - per d/w Edmundo Hernandez today, he is currently prescribed Prednisone 5 mg daily, Cellcept 500 mg BID, and Prograf 1.5 mg BID  - Nephrology consulted  - Continue home Prednisone and Prograf for now  - Hold Cellcept per Dr. Staley  - Monitor Prograf levels   - Followed by Dr. Blanco in [place]   -Continue current medical management plan   -Nephrology following closely    -Creatinine increased to 2.6 most likely due to hypotension   -Gentle IV hydration

## 2018-11-09 NOTE — ANESTHESIA POSTPROCEDURE EVALUATION
"Anesthesia Post Evaluation    Patient: Lavelle Ladd    Procedure(s) Performed: Procedure(s) (LRB):  CLOSURE, WOUND (Left)  DEBRIDEMENT, METATARSAL BONE, 2 OR MORE BONES (Left)  AMPUTATION, FOOT, TRANSMETATARSAL (Left)    Final Anesthesia Type: MAC  Patient location during evaluation: PACU  Patient participation: Yes- Able to Participate  Level of consciousness: awake and alert  Post-procedure vital signs: reviewed and stable  Pain management: adequate  Airway patency: patent  PONV status at discharge: No PONV  Anesthetic complications: no      Cardiovascular status: blood pressure returned to baseline  Respiratory status: unassisted  Hydration status: euvolemic  Follow-up not needed.        Visit Vitals  /64   Pulse 79   Temp 36.8 °C (98.2 °F)   Resp 16   Ht 5' 11" (1.803 m)   Wt 99.8 kg (220 lb 0.3 oz)   SpO2 100%   BMI 30.69 kg/m²       Pain/Shereen Score: Pain Assessment Performed: Yes (11/9/2018  8:30 AM)  Presence of Pain: denies (11/9/2018  8:30 AM)  Pain Rating Prior to Med Admin: 6 (11/9/2018  4:21 AM)  Pain Rating Post Med Admin: 4 (11/8/2018  7:22 PM)        "

## 2018-11-09 NOTE — PLAN OF CARE
Problem: Patient Care Overview  Goal: Plan of Care Review  Outcome: Ongoing (interventions implemented as appropriate)  Recommendations     Recommendations/Intervention: 1. Add cardiac restriction to current diet. 2. Boost Glucose Control BID. 3. Provided diabetic diet education w/ handout from the Nutrition Care Manual. Pt verbalized understanding, all concerns addressed. RD contact info provided. 4. Will continue to monitor.   Goals: Meet >85% EEN/EPN  Nutrition Goal Status: new   Communication of RD recs: POc, sticky note

## 2018-11-09 NOTE — PROGRESS NOTES
[facility] Hospital Medicine  Progress Note    Patient Name: Lavelle Ladd  MRN: [id number]  Patient Class: IP- Inpatient   Admission Date: 10/31/2018  Length of Stay: 9 days  Attending Physician: Ruma Rand MD  Primary Care Provider: Rishabh Esteban MD        Subjective:     Principal Problem:S/P transmetatarsal amputation of foot, left    HPI:  Lavelle Ladd is a 63 y.o. male  with a PMHx of COPD, CAD, Diastolic CHF, CKD, Renal transplant, GERD, Hepatitis C, HLD, HTN, PVD, and IDDM who presented to the hospital today as a direct admit for planned surgery today per Dr. Wheeler.  Left foot xray today showed amputation of the distal aspect of the left foot at the level of the metatarsal bones.  No lytic abnormalities to suggest osteomyelitis by plain radiograph.  No evidence of acute fracture or dislocation.  Bony mineralization is normal.  Diffuse soft tissue and dense vascular calcifications.  Large plantar calcaneal spur.  He is s/p left transmetatarsal amputation secondary to gangrene of multiple digits and JOSE R.  Patient underwent delayed primary wound closure, on 9/24/18.  He presented to podiatry today for his 2 week follow-up of wound closure 2/2 to wound dehiscence.  Patient missed his last postoperative visit as he states he was unable to get a ride to his appointment.  He also admits to ambulating on his amputation site.  He drove himself to clinic today as he was unable to get a ride.  He states that he has gotten his foot wet as he has taken showers covering extremity in a garbage bag.  He denies any fever, chills, foot pain, CP, SOB, N/V/D, and all other symptoms at this time.  Patient has a history of poor compliance and difficult living dispo as he lives alone.  In the past we did try to get patient admitted to skilled nursing facility, but he has refused such services.   Wound cx on 9/21 showed MSSA for which he has completed IV cefazolin for 14 days secondary to MSSA  gangrene.  He has been NPO since midnight.  He will be admitted to the Medicine unit under the care of Hospital Medicine.  ID consulted per podiatry recs to guide antibiotic therapy postoperatively.  He is a [employer].  His SDM is his sister Kecia Williamson who can be reached at [phone].                Hospital Course:  11/1/18 The patient is S/P I&D of left foot yesterday. No acute issues overnight. The patient reports that his pain is well controlled. Continue IV ABX, cultures pending. Infectious disease is following. 11/2/18 wound culture growing Enterococcus and Enterobacter. Podiatry recommends SNF vs LTAC placement due to potential for limb loss. 11/3/18- Will continue zyvox and zosyn. Creatinine level at baseline. Surgery planned for Monday by Dr. Wheeler for secondary wound closure. 11/4/18- Patient awake and alert today. Dr. Guthrie at bedside performing wound care to left transmetatarsal amputation stump. Patient tolerated well. Plan for surgery tomorrow. Continue zyvox, IV zosyn discontinued and IV Rocephin added. 11/5/18-Creatinine stable. Blood pressure elevate this morning, lisinopril added per nephrology. Awaiting surgery for wound closure. 11/6/18-  S/p Left foot Secondary Wound Closure and debridement of Bone/Transmetatarsal Amputation by Dr. Wheeler.  Patine spike fever this afternoon. WBCs remain negative. CXR pending. Encourage IS use. Infectious disease on board. Social work consult for discharge planning (SNF vs LTAC). Patient will need IV abx x 6 weeks. 11/7/18- Early morning patient became hypotensive. Bidil and Lisinopril discontinued, amlodipine decreased to 2.5 mg daily. Blood pressure still marginally low but better. Creatinine increased to 2.6, most likely due to hypotension. Continue IVFs. 11/8/18- Creatinine trending down, will continue gentle IVFs. CT chest negative for acute findings. 11/9/18-No change in status, will continue current management plan.                Interval  History: No acute events overnight. Awaiting placement.     Review of Systems   Constitutional: Negative for chills, diaphoresis, fatigue and fever.   HENT: Negative for hearing loss, mouth sores, sore throat, tinnitus and trouble swallowing.    Eyes: Negative for pain, discharge and redness.   Respiratory: Negative for apnea, cough, choking, chest tightness, shortness of breath, wheezing and stridor.    Cardiovascular: Negative for chest pain, palpitations and leg swelling.   Gastrointestinal: Negative for abdominal distention, abdominal pain, blood in stool, constipation, diarrhea, nausea, rectal pain and vomiting.   Endocrine: Negative for cold intolerance, heat intolerance, polydipsia, polyphagia and polyuria.   Genitourinary: Negative for difficulty urinating, dysuria, flank pain, frequency, hematuria and urgency.   Musculoskeletal: Negative for arthralgias, back pain, gait problem, joint swelling, neck pain and neck stiffness.   Skin: Positive for color change and wound. Negative for rash.   Allergic/Immunologic: Negative for food allergies.   Neurological: Negative for dizziness, tremors, seizures, syncope, speech difficulty, light-headedness and headaches.   Hematological: Negative for adenopathy. Does not bruise/bleed easily.   Psychiatric/Behavioral: Negative for agitation and confusion. The patient is not nervous/anxious.    All other systems reviewed and are negative.    Objective:     Vital Signs (Most Recent):  Temp: 98.2 °F (36.8 °C) (11/09/18 1100)  Pulse: 79 (11/09/18 0859)  Resp: 16 (11/09/18 0859)  BP: 113/64 (11/09/18 0852)  SpO2: 100 % (11/09/18 0859) Vital Signs (24h Range):  Temp:  [98 °F (36.7 °C)-98.6 °F (37 °C)] 98.2 °F (36.8 °C)  Pulse:  [68-88] 79  Resp:  [16-18] 16  SpO2:  [94 %-100 %] 100 %  BP: (113-192)/(64-87) 113/64     Weight: 99.8 kg (220 lb 0.3 oz)  Body mass index is 30.69 kg/m².    Intake/Output Summary (Last 24 hours) at 11/9/2018 1413  Last data filed at 11/9/2018 0854  Gross  per 24 hour   Intake 3106.67 ml   Output 1700 ml   Net 1406.67 ml      Physical Exam   Constitutional: He is oriented to person, place, and time. He appears well-developed and well-nourished. No distress.   HENT:   Head: Normocephalic and atraumatic.   Mouth/Throat: Oropharynx is clear and moist.   Eyes: Conjunctivae and EOM are normal. Pupils are equal, round, and reactive to light.   Neck: Normal range of motion. Neck supple. No JVD present.   Cardiovascular: Normal rate, regular rhythm, normal heart sounds and intact distal pulses. Exam reveals no gallop and no friction rub.   No murmur heard.  Pulmonary/Chest: Effort normal and breath sounds normal. No stridor. No respiratory distress. He has no wheezes. He has no rales. He exhibits no tenderness.   Abdominal: Soft. Bowel sounds are normal. He exhibits no distension and no mass. There is no tenderness. There is no rebound and no guarding.   Musculoskeletal: Normal range of motion. He exhibits no edema or tenderness.   Neurological: He is alert and oriented to person, place, and time. No cranial nerve deficit.   Skin: Skin is warm and dry. No rash noted. He is not diaphoretic. No erythema.   LLE dressing CDI.    Psychiatric: He has a normal mood and affect. His speech is normal and behavior is normal. Judgment and thought content normal. Cognition and memory are normal.   Nursing note and vitals reviewed.      Significant Labs:   Blood Culture: No results for input(s): LABBLOO in the last 48 hours.  BMP:   Recent Labs   Lab 11/09/18  0444   *      K 4.7      CO2 23   BUN 21   CREATININE 1.8*   CALCIUM 9.7     CBC:   Recent Labs   Lab 11/08/18  0453 11/09/18  0444   WBC 7.26 6.27   HGB 10.3* 10.9*   HCT 32.3* 34.8*    241       Significant Imaging:       Assessment/Plan:      * S/P transmetatarsal amputation of foot, left    - with delayed wound healing secondary to non-compliance with podiatry recommendations (patient reports getting  foot wet and bearing weight)  - Defer care to podiatry  -wound cultures growing Enterobacter and Enterococcus  -scheduled for wound closure on Monday 11/5/18  -Infectious Disease following  -will need LTAC vs SNF  -Will continue zyvox and zosyn   11/4/18  -Surgery planned for tomorrow  -ID following  -IV zosyn discontinued   -Continue Zyvox po and IV Rocephin added   11/5/18  -Awaiting surgery for wound closure   11/6/18  -S/p Left foot Secondary Wound Closure and debridement of Bone/Transmetatarsal Amputation by Dr. Wheeler.   -Patient spike fever of 101.4F, IS encouraged   - CXR pending   -Social work consult for discharge planning (SNF vs LTAC). Patient will need IV abx for 6 weeks   11/7/18  -CXR showed no acute findings   -WBCs remain negative, no fever in 24 hours   -hypotension today, but is afebrile, and WBC is not high  - Likely not septic, will monitor closely.  11/8/18  -Continue current plan   11/9/18  -Awaiting placement      Immunosuppression    Hx of kidney transplant   Continue prednisone and Prograf per Nephrology  Prograf level 7.0 on 11/7/18 11/9/18  -Prograf level pending        Hx of right BKA    - Site is intact, well healed   -No s/s of infection          Non compliance with medical treatment    - Educated on the importance of adhering to medical treatment plan  - Patient has a limited support system  - Social work consult for DC planning (SNF vs LTAC)       Abscess of left foot    See plan for s/p transmetatarsal amputation of foot  Wound cultures grew VRE and enterobacter  ID following   Zyvox po and IV Rocephin .  Will follow anaerobic cultures  Bactrim added for stenotrophomonas by Dr. Veliz   Continue current treatment   Continue wound care        Peripheral vascular disease    - Resume home ASA   - Vascular f/u as needed       Chronic bilateral low back pain with left-sided sciatica    - Resume home Norco PRN  - Monitor  -Pain controlled  -Stable      Kidney transplant recipient    -  patient is noted to be a poor historian and does not know what medications he takes at home  - per d/w Edmundo Hernandez today, he is currently prescribed Prednisone 5 mg daily, Cellcept 500 mg BID, and Prograf 1.5 mg BID  - Nephrology consulted  - Continue home Prednisone and Prograf for now  - Hold Cellcept per Dr. Staley  - Monitor Prograf levels   - Followed by Dr. Blanco in [place]   -Continue current medical management plan   -Nephrology following closely    -Creatinine increased to 2.6 most likely due to hypotension   -Gentle IV hydration         Acute renal failure superimposed on stage 3 chronic kidney disease    -Creatinine 1.8 today. Slight increase. Closely monitor  - Daily BMP  - Monitor  -Creatinine 1.5 at baseline   -Creatinine stable  -Nephrology following   -Remains stable   -No change  11/7/18  -Creatinine 2.6, most likely 2/2 hypotension   -Bp medications adjusted   -IVFs   11/8/18  -Improving Creatinine currently 2.2  -Continue gentle IV hydration   11/9/18  -Creatinine 1.8       Chronic obstructive pulmonary disease    - Currently appears compensated  - Continue home meds  - Supplemental O2 PRN  - Monitor  -No change      Coronary artery disease involving native coronary artery of native heart without angina pectoris    -Continue Cardiac medications  -Cardiac monitoring  -Stable        Type 2 diabetes mellitus with hyperglycemia, with long-term current use of insulin    - A1c 8.0 in September  -Glucose slightly uncontrolled  - Accu checks ACHS with SSI PRN  - add NPH at lower doses  - Monitor  -SSI dose increased to moderate   -Continue to monitor adjust insulin accordingly        Essential hypertension    - BP well controlled  - Continue home Norvasc and Metoprolol  - Monitor  -Blood pressure needing better control, bidil added   -Lisinopril added   -BP stabilized   11/7/18  -Was previously elevated   -Bidil and Lisinopril stopped and amlodipine dose decreased to 2.5 daily   -Bp still marginally  low, IVFs   -Monitor   11/8/18  -Blood pressure improved   -Will continue to hold ACEI   11/9/18  -Monitor        VTE Risk Mitigation (From admission, onward)        Ordered     IP VTE HIGH RISK PATIENT  Once      11/05/18 1555     Place sequential compression device  Until discontinued      11/05/18 3765              Carla Irby NP  Department of Hospital Medicine   [facility]

## 2018-11-09 NOTE — PLAN OF CARE
Problem: Patient Care Overview  Goal: Plan of Care Review  Outcome: Ongoing (interventions implemented as appropriate)  Pt is on room air; tolerates txs well; pt refuses to do the incentive spirometer.

## 2018-11-09 NOTE — PLAN OF CARE
PAOLA spoke to Bill with [facility].  She is coming to hospital for bedside evaluation and review records.  Bill is awaiting the DON to say they can accept an IV/isolation patient.     PAOLA made referral to [facility] in [facility] care for review.

## 2018-11-10 PROBLEM — M86.9 OSTEOMYELITIS: Status: ACTIVE | Noted: 2018-11-10

## 2018-11-10 LAB
ANION GAP SERPL CALC-SCNC: 8 MMOL/L
BASOPHILS # BLD AUTO: 0.01 K/UL
BASOPHILS NFR BLD: 0.2 %
BUN SERPL-MCNC: 22 MG/DL
CALCIUM SERPL-MCNC: 9.2 MG/DL
CHLORIDE SERPL-SCNC: 106 MMOL/L
CO2 SERPL-SCNC: 22 MMOL/L
CREAT SERPL-MCNC: 1.6 MG/DL
DIFFERENTIAL METHOD: ABNORMAL
EOSINOPHIL # BLD AUTO: 0.1 K/UL
EOSINOPHIL NFR BLD: 1.2 %
ERYTHROCYTE [DISTWIDTH] IN BLOOD BY AUTOMATED COUNT: 16 %
EST. GFR  (AFRICAN AMERICAN): 51 ML/MIN/1.73 M^2
EST. GFR  (NON AFRICAN AMERICAN): 45 ML/MIN/1.73 M^2
GLUCOSE SERPL-MCNC: 128 MG/DL
HCT VFR BLD AUTO: 32.8 %
HGB BLD-MCNC: 10.1 G/DL
LYMPHOCYTES # BLD AUTO: 1.7 K/UL
LYMPHOCYTES NFR BLD: 35.1 %
MCH RBC QN AUTO: 28.5 PG
MCHC RBC AUTO-ENTMCNC: 30.8 G/DL
MCV RBC AUTO: 92 FL
MONOCYTES # BLD AUTO: 0.3 K/UL
MONOCYTES NFR BLD: 5.2 %
NEUTROPHILS # BLD AUTO: 2.8 K/UL
NEUTROPHILS NFR BLD: 58.3 %
PLATELET # BLD AUTO: 210 K/UL
PMV BLD AUTO: 9.3 FL
POCT GLUCOSE: 151 MG/DL (ref 70–110)
POCT GLUCOSE: 182 MG/DL (ref 70–110)
POCT GLUCOSE: 185 MG/DL (ref 70–110)
POTASSIUM SERPL-SCNC: 4.7 MMOL/L
RBC # BLD AUTO: 3.55 M/UL
SODIUM SERPL-SCNC: 136 MMOL/L
TACROLIMUS BLD-MCNC: 5.6 NG/ML
WBC # BLD AUTO: 4.81 K/UL

## 2018-11-10 PROCEDURE — 80048 BASIC METABOLIC PNL TOTAL CA: CPT | Mod: HCNC

## 2018-11-10 PROCEDURE — 25000003 PHARM REV CODE 250: Mod: HCNC | Performed by: PODIATRIST

## 2018-11-10 PROCEDURE — 25000003 PHARM REV CODE 250: Mod: HCNC | Performed by: FAMILY MEDICINE

## 2018-11-10 PROCEDURE — 25000242 PHARM REV CODE 250 ALT 637 W/ HCPCS: Mod: HCNC | Performed by: PODIATRIST

## 2018-11-10 PROCEDURE — 94760 N-INVAS EAR/PLS OXIMETRY 1: CPT | Mod: HCNC

## 2018-11-10 PROCEDURE — 85025 COMPLETE CBC W/AUTO DIFF WBC: CPT | Mod: HCNC

## 2018-11-10 PROCEDURE — 96372 THER/PROPH/DIAG INJ SC/IM: CPT | Mod: HCNC

## 2018-11-10 PROCEDURE — 21400001 HC TELEMETRY ROOM: Mod: HCNC

## 2018-11-10 PROCEDURE — 94640 AIRWAY INHALATION TREATMENT: CPT | Mod: HCNC

## 2018-11-10 PROCEDURE — 25000003 PHARM REV CODE 250: Mod: HCNC | Performed by: NURSE PRACTITIONER

## 2018-11-10 PROCEDURE — 63600175 PHARM REV CODE 636 W HCPCS: Mod: HCNC | Performed by: NURSE PRACTITIONER

## 2018-11-10 PROCEDURE — 36415 COLL VENOUS BLD VENIPUNCTURE: CPT | Mod: HCNC

## 2018-11-10 RX ORDER — LEVOFLOXACIN 5 MG/ML
750 INJECTION, SOLUTION INTRAVENOUS
Status: DISCONTINUED | OUTPATIENT
Start: 2018-11-11 | End: 2018-11-13 | Stop reason: HOSPADM

## 2018-11-10 RX ADMIN — ARFORMOTEROL TARTRATE 15 MCG: 15 SOLUTION RESPIRATORY (INHALATION) at 07:11

## 2018-11-10 RX ADMIN — GABAPENTIN 300 MG: 300 CAPSULE ORAL at 08:11

## 2018-11-10 RX ADMIN — HYDROCODONE BITARTRATE AND ACETAMINOPHEN 1 TABLET: 5; 325 TABLET ORAL at 08:11

## 2018-11-10 RX ADMIN — BUDESONIDE 0.5 MG: 0.5 SUSPENSION RESPIRATORY (INHALATION) at 07:11

## 2018-11-10 RX ADMIN — GABAPENTIN 300 MG: 300 CAPSULE ORAL at 04:11

## 2018-11-10 RX ADMIN — HUMAN INSULIN 20 UNITS: 100 INJECTION, SUSPENSION SUBCUTANEOUS at 05:11

## 2018-11-10 RX ADMIN — TACROLIMUS 1.5 MG: 0.5 CAPSULE ORAL at 08:11

## 2018-11-10 RX ADMIN — TACROLIMUS 1.5 MG: 0.5 CAPSULE ORAL at 05:11

## 2018-11-10 RX ADMIN — PREDNISONE 5 MG: 5 TABLET ORAL at 08:11

## 2018-11-10 RX ADMIN — BUDESONIDE 0.5 MG: 0.5 SUSPENSION RESPIRATORY (INHALATION) at 11:11

## 2018-11-10 RX ADMIN — METOPROLOL TARTRATE 25 MG: 25 TABLET ORAL at 08:11

## 2018-11-10 RX ADMIN — HYDROCODONE BITARTRATE AND ACETAMINOPHEN 1 TABLET: 5; 325 TABLET ORAL at 04:11

## 2018-11-10 RX ADMIN — HUMAN INSULIN 20 UNITS: 100 INJECTION, SUSPENSION SUBCUTANEOUS at 04:11

## 2018-11-10 RX ADMIN — INSULIN ASPART 2 UNITS: 100 INJECTION, SOLUTION INTRAVENOUS; SUBCUTANEOUS at 05:11

## 2018-11-10 RX ADMIN — INSULIN ASPART 1 UNITS: 100 INJECTION, SOLUTION INTRAVENOUS; SUBCUTANEOUS at 11:11

## 2018-11-10 RX ADMIN — ARFORMOTEROL TARTRATE 15 MCG: 15 SOLUTION RESPIRATORY (INHALATION) at 11:11

## 2018-11-10 RX ADMIN — SODIUM BICARBONATE 650 MG TABLET 2600 MG: at 08:11

## 2018-11-10 RX ADMIN — ASPIRIN 81 MG: 81 TABLET, COATED ORAL at 08:11

## 2018-11-10 RX ADMIN — AMLODIPINE BESYLATE 10 MG: 10 TABLET ORAL at 08:11

## 2018-11-10 NOTE — ASSESSMENT & PLAN NOTE
-Creatinine 1.8 today. Slight increase. Closely monitor  - Daily BMP  - Monitor  -Creatinine 1.5 at baseline   -Creatinine stable  -Nephrology following   -Remains stable   -No change  11/7/18  -Creatinine 2.6, most likely 2/2 hypotension   -Bp medications adjusted   -IVFs   11/8/18  -Improving Creatinine currently 2.2  -Continue gentle IV hydration   11/9/18  -Creatinine 1.8  11/10/18- trending down at 1.6- current plan of care continued

## 2018-11-10 NOTE — SUBJECTIVE & OBJECTIVE
Interval History: pt stable overnight.  Osteo suspected per Podiatry with long term antibiotics anticipated.  Blood cultures with NGTD.  ID following.  Discharge to [facility] anticipated on Monday.     Review of Systems   Constitutional: Negative for chills, diaphoresis, fatigue and fever.   HENT: Negative for hearing loss, mouth sores, sore throat, tinnitus and trouble swallowing.    Eyes: Negative for pain, discharge and redness.   Respiratory: Negative for apnea, cough, choking, chest tightness, shortness of breath, wheezing and stridor.    Cardiovascular: Negative for chest pain, palpitations and leg swelling.   Gastrointestinal: Negative for abdominal distention, abdominal pain, blood in stool, constipation, diarrhea, nausea, rectal pain and vomiting.   Endocrine: Negative for cold intolerance, heat intolerance, polydipsia, polyphagia and polyuria.   Genitourinary: Negative for difficulty urinating, dysuria, flank pain, frequency, hematuria and urgency.   Musculoskeletal: Negative for arthralgias, back pain, gait problem, joint swelling, neck pain and neck stiffness.   Skin: Positive for color change and wound. Negative for rash.   Allergic/Immunologic: Negative for food allergies.   Neurological: Negative for dizziness, tremors, seizures, syncope, speech difficulty, light-headedness and headaches.   Hematological: Negative for adenopathy. Does not bruise/bleed easily.   Psychiatric/Behavioral: Negative for agitation and confusion. The patient is not nervous/anxious.    All other systems reviewed and are negative.    Objective:     Vital Signs (Most Recent):  Temp: 98.4 °F (36.9 °C) (11/10/18 1201)  Pulse: 73 (11/10/18 1201)  Resp: 18 (11/10/18 1102)  BP: (!) 120/58 (11/10/18 1201)  SpO2: 95 % (11/10/18 1201) Vital Signs (24h Range):  Temp:  [97.9 °F (36.6 °C)-98.4 °F (36.9 °C)] 98.4 °F (36.9 °C)  Pulse:  [67-82] 73  Resp:  [16-18] 18  SpO2:  [93 %-97 %] 95 %  BP: (120-165)/(58-84) 120/58     Weight: 99.8  kg (220 lb 0.3 oz)  Body mass index is 30.69 kg/m².    Intake/Output Summary (Last 24 hours) at 11/10/2018 1615  Last data filed at 11/10/2018 1230  Gross per 24 hour   Intake 2348.33 ml   Output 1200 ml   Net 1148.33 ml      Physical Exam   Constitutional: He is oriented to person, place, and time. He appears well-developed and well-nourished. No distress.   HENT:   Head: Normocephalic and atraumatic.   Mouth/Throat: Oropharynx is clear and moist.   Eyes: Conjunctivae and EOM are normal.   Neck: Normal range of motion. Neck supple. No JVD present.   Cardiovascular: Normal rate, regular rhythm, normal heart sounds and intact distal pulses. Exam reveals no gallop and no friction rub.   No murmur heard.  Pulmonary/Chest: Effort normal and breath sounds normal. No stridor. No respiratory distress. He has no wheezes. He has no rales. He exhibits no tenderness.   Abdominal: Soft. Bowel sounds are normal. He exhibits no distension and no mass. There is no tenderness. There is no rebound and no guarding.   Genitourinary:   Genitourinary Comments: Deferred   Musculoskeletal: Normal range of motion. He exhibits no edema or tenderness.   Soft cast in place to LLE   Neurological: He is alert and oriented to person, place, and time. No cranial nerve deficit.   Skin: Skin is warm and dry. No rash noted. He is not diaphoretic. No erythema.   LLE dressing CDI.    Psychiatric: He has a normal mood and affect. His speech is normal and behavior is normal. Judgment and thought content normal. Cognition and memory are normal.   Nursing note and vitals reviewed.      Significant Labs:   CBC:   Recent Labs   Lab 11/09/18  0444 11/09/18  2007 11/10/18  0444   WBC 6.27 5.56 4.81   HGB 10.9* 10.1* 10.1*   HCT 34.8* 32.7* 32.8*    208 210     CMP:   Recent Labs   Lab 11/09/18 0444 11/09/18  2007 11/10/18  0444    135* 136   K 4.7 5.7* 4.7    106 106   CO2 23 23 22*   * 166* 128*   BUN 21 25* 22   CREATININE 1.8*  1.9* 1.6*   CALCIUM 9.7 9.3 9.2   ALBUMIN  --  2.4*  --    ANIONGAP 9 6* 8   EGFRNONAA 39* 36* 45*       Significant Imaging:

## 2018-11-10 NOTE — PLAN OF CARE
Problem: Patient Care Overview  Goal: Plan of Care Review  Outcome: Ongoing (interventions implemented as appropriate)  Remains free from injury. States relief of pain with available prn meds. Small amount of dried drainage to LLE dressing. NSR on heart monitor. Tolerating diet. Vital signs stable. Chart reviewed. Will continue to monitor.

## 2018-11-10 NOTE — PROGRESS NOTES
[facility] Hospital Medicine  Progress Note    Patient Name: Lavelle Ladd  MRN: [id number]  Patient Class: IP- Inpatient   Admission Date: 10/31/2018  Length of Stay: 10 days  Attending Physician: Shelton Edgar MD  Primary Care Provider: Rishabh Esteban MD        Subjective:     Principal Problem:S/P transmetatarsal amputation of foot, left    HPI:  Lavelle Ladd is a 63 y.o. male  with a PMHx of COPD, CAD, Diastolic CHF, CKD, Renal transplant, GERD, Hepatitis C, HLD, HTN, PVD, and IDDM who presented to the hospital today as a direct admit for planned surgery today per Dr. Wheeler.  Left foot xray today showed amputation of the distal aspect of the left foot at the level of the metatarsal bones.  No lytic abnormalities to suggest osteomyelitis by plain radiograph.  No evidence of acute fracture or dislocation.  Bony mineralization is normal.  Diffuse soft tissue and dense vascular calcifications.  Large plantar calcaneal spur.  He is s/p left transmetatarsal amputation secondary to gangrene of multiple digits and JOSE R.  Patient underwent delayed primary wound closure, on 9/24/18.  He presented to podiatry today for his 2 week follow-up of wound closure 2/2 to wound dehiscence.  Patient missed his last postoperative visit as he states he was unable to get a ride to his appointment.  He also admits to ambulating on his amputation site.  He drove himself to clinic today as he was unable to get a ride.  He states that he has gotten his foot wet as he has taken showers covering extremity in a garbage bag.  He denies any fever, chills, foot pain, CP, SOB, N/V/D, and all other symptoms at this time.  Patient has a history of poor compliance and difficult living dispo as he lives alone.  In the past we did try to get patient admitted to skilled nursing facility, but he has refused such services.   Wound cx on 9/21 showed MSSA for which he has completed IV cefazolin for 14 days secondary to MSSA  gangrene.  He has been NPO since midnight.  He will be admitted to the Medicine unit under the care of Hospital Medicine.  ID consulted per podiatry recs to guide antibiotic therapy postoperatively.  He is a [employer].  His SDM is his sister Kecia Williamson who can be reached at [phone].                Hospital Course:  11/1/18 The patient is S/P I&D of left foot yesterday. No acute issues overnight. The patient reports that his pain is well controlled. Continue IV ABX, cultures pending. Infectious disease is following. 11/2/18 wound culture growing Enterococcus and Enterobacter. Podiatry recommends SNF vs LTAC placement due to potential for limb loss. 11/3/18- Will continue zyvox and zosyn. Creatinine level at baseline. Surgery planned for Monday by Dr. Wheeler for secondary wound closure. 11/4/18- Patient awake and alert today. Dr. Guthrie at bedside performing wound care to left transmetatarsal amputation stump. Patient tolerated well. Plan for surgery tomorrow. Continue zyvox, IV zosyn discontinued and IV Rocephin added. 11/5/18-Creatinine stable. Blood pressure elevate this morning, lisinopril added per nephrology. Awaiting surgery for wound closure. 11/6/18-  S/p Left foot Secondary Wound Closure and debridement of Bone/Transmetatarsal Amputation by Dr. Wheeler.  Patine spike fever this afternoon. WBCs remain negative. CXR pending. Encourage IS use. Infectious disease on board. Social work consult for discharge planning (SNF vs LTAC). Patient will need IV abx x 6 weeks. 11/7/18- Early morning patient became hypotensive. Bidil and Lisinopril discontinued, amlodipine decreased to 2.5 mg daily. Blood pressure still marginally low but better. Creatinine increased to 2.6, most likely due to hypotension. Continue IVFs. 11/8/18- Creatinine trending down, will continue gentle IVFs. CT chest negative for acute findings. 11/9/18-No change in status, will continue current management plan.  11/10/18- Osteomyelitis  diagnosed per pathology report-per Podiatry with long term IV antibiotics anticipated.  ID following.  CM to assist with placement with discharge to [facility] anticipated.                 Interval History: pt stable overnight.  Osteo suspected per Podiatry with long term antibiotics anticipated.  Blood cultures with NGTD.  ID following.  Discharge to [facility] anticipated on Monday.     Review of Systems   Constitutional: Negative for chills, diaphoresis, fatigue and fever.   HENT: Negative for hearing loss, mouth sores, sore throat, tinnitus and trouble swallowing.    Eyes: Negative for pain, discharge and redness.   Respiratory: Negative for apnea, cough, choking, chest tightness, shortness of breath, wheezing and stridor.    Cardiovascular: Negative for chest pain, palpitations and leg swelling.   Gastrointestinal: Negative for abdominal distention, abdominal pain, blood in stool, constipation, diarrhea, nausea, rectal pain and vomiting.   Endocrine: Negative for cold intolerance, heat intolerance, polydipsia, polyphagia and polyuria.   Genitourinary: Negative for difficulty urinating, dysuria, flank pain, frequency, hematuria and urgency.   Musculoskeletal: Negative for arthralgias, back pain, gait problem, joint swelling, neck pain and neck stiffness.   Skin: Positive for color change and wound. Negative for rash.   Allergic/Immunologic: Negative for food allergies.   Neurological: Negative for dizziness, tremors, seizures, syncope, speech difficulty, light-headedness and headaches.   Hematological: Negative for adenopathy. Does not bruise/bleed easily.   Psychiatric/Behavioral: Negative for agitation and confusion. The patient is not nervous/anxious.    All other systems reviewed and are negative.    Objective:     Vital Signs (Most Recent):  Temp: 98.4 °F (36.9 °C) (11/10/18 1201)  Pulse: 73 (11/10/18 1201)  Resp: 18 (11/10/18 1102)  BP: (!) 120/58 (11/10/18 1201)  SpO2: 95 % (11/10/18 1201) Vital  Signs (24h Range):  Temp:  [97.9 °F (36.6 °C)-98.4 °F (36.9 °C)] 98.4 °F (36.9 °C)  Pulse:  [67-82] 73  Resp:  [16-18] 18  SpO2:  [93 %-97 %] 95 %  BP: (120-165)/(58-84) 120/58     Weight: 99.8 kg (220 lb 0.3 oz)  Body mass index is 30.69 kg/m².    Intake/Output Summary (Last 24 hours) at 11/10/2018 1615  Last data filed at 11/10/2018 1230  Gross per 24 hour   Intake 2348.33 ml   Output 1200 ml   Net 1148.33 ml      Physical Exam   Constitutional: He is oriented to person, place, and time. He appears well-developed and well-nourished. No distress.   HENT:   Head: Normocephalic and atraumatic.   Mouth/Throat: Oropharynx is clear and moist.   Eyes: Conjunctivae and EOM are normal.   Neck: Normal range of motion. Neck supple. No JVD present.   Cardiovascular: Normal rate, regular rhythm, normal heart sounds and intact distal pulses. Exam reveals no gallop and no friction rub.   No murmur heard.  Pulmonary/Chest: Effort normal and breath sounds normal. No stridor. No respiratory distress. He has no wheezes. He has no rales. He exhibits no tenderness.   Abdominal: Soft. Bowel sounds are normal. He exhibits no distension and no mass. There is no tenderness. There is no rebound and no guarding.   Genitourinary:   Genitourinary Comments: Deferred   Musculoskeletal: Normal range of motion. He exhibits no edema or tenderness.   Soft cast in place to LLE   Neurological: He is alert and oriented to person, place, and time. No cranial nerve deficit.   Skin: Skin is warm and dry. No rash noted. He is not diaphoretic. No erythema.   LLE dressing CDI.    Psychiatric: He has a normal mood and affect. His speech is normal and behavior is normal. Judgment and thought content normal. Cognition and memory are normal.   Nursing note and vitals reviewed.      Significant Labs:   CBC:   Recent Labs   Lab 11/09/18  0444 11/09/18  2007 11/10/18  0444   WBC 6.27 5.56 4.81   HGB 10.9* 10.1* 10.1*   HCT 34.8* 32.7* 32.8*    208 210      CMP:   Recent Labs   Lab 11/09/18  0444 11/09/18  2007 11/10/18  0444    135* 136   K 4.7 5.7* 4.7    106 106   CO2 23 23 22*   * 166* 128*   BUN 21 25* 22   CREATININE 1.8* 1.9* 1.6*   CALCIUM 9.7 9.3 9.2   ALBUMIN  --  2.4*  --    ANIONGAP 9 6* 8   EGFRNONAA 39* 36* 45*       Significant Imaging:         Assessment/Plan:      * S/P transmetatarsal amputation of foot, left    - with delayed wound healing secondary to non-compliance with podiatry recommendations (patient reports getting foot wet and bearing weight)  - Defer care to podiatry  -wound cultures growing Enterobacter and Enterococcus  -scheduled for wound closure on Monday 11/5/18  -Infectious Disease following  -will need LTAC vs SNF  -Will continue zyvox and zosyn   11/4/18  -Surgery planned for tomorrow  -ID following  -IV zosyn discontinued   -Continue Zyvox po and IV Rocephin added   11/5/18  -Awaiting surgery for wound closure   11/6/18  -S/p Left foot Secondary Wound Closure and debridement of Bone/Transmetatarsal Amputation by Dr. Wheeler.   -Patient spike fever of 101.4F, IS encouraged   - CXR pending   -Social work consult for discharge planning (SNF vs LTAC). Patient will need IV abx for 6 weeks   11/7/18  -CXR showed no acute findings   -WBCs remain negative, no fever in 24 hours   -hypotension today, but is afebrile, and WBC is not high  - Likely not septic, will monitor closely.  11/8/18  -Continue current plan   11/9/18  -Awaiting placement   11/10/18- Osteo suspected per pathology report per Podiatry. -PICC to be placed with discharge to [facility] anticipated     Osteomyelitis    -confirmed per pathology report  -Podiatry following  -ID following   -IV antibiotics   -PICC line to be placed for long term therapy  -discharge to [facility] anticipated         Immunosuppression    Hx of kidney transplant   Continue prednisone and Prograf per Nephrology  Prograf level 7.0 on 11/7/18 11/9/18-Prograf level  pending  11/10/18- Prograf level 5.6       Hx of right BKA    - Site is intact, well healed   -No s/s of infection          Non compliance with medical treatment    - Educated on the importance of adhering to medical treatment plan  - Patient has a limited support system  - Social work consult for DC planning (SNF vs LTAC)  -[facility] anticipated on Monday 11/12/18       Abscess of left foot    See plan for s/p transmetatarsal amputation of foot  Wound cultures grew VRE and enterobacter  ID following   Zyvox po and IV Rocephin .  Will follow anaerobic cultures  Bactrim added for stenotrophomonas by Dr. Veliz   Continue current treatment   Continue wound care        Peripheral vascular disease    - Resume home ASA   - Vascular f/u as needed       Chronic bilateral low back pain with left-sided sciatica    - Resume home Norco PRN  - Monitor  -Pain controlled  -Stable      Kidney transplant recipient    - patient is noted to be a poor historian and does not know what medications he takes at home  - per d/w Edmundo Hernandez today, he is currently prescribed Prednisone 5 mg daily, Cellcept 500 mg BID, and Prograf 1.5 mg BID  - Nephrology consulted  - Continue home Prednisone and Prograf for now  - Hold Cellcept per Dr. Staley  - Monitor Prograf levels   - Followed by Dr. Blanco in [place]   -Continue current medical management plan   -Nephrology following closely    -Creatinine increased to 2.6 most likely due to hypotension   -Gentle IV hydration    -Creatinine improved -1.6 11/10/18       Acute renal failure superimposed on stage 3 chronic kidney disease    -Creatinine 1.8 today. Slight increase. Closely monitor  - Daily BMP  - Monitor  -Creatinine 1.5 at baseline   -Creatinine stable  -Nephrology following   -Remains stable   -No change  11/7/18  -Creatinine 2.6, most likely 2/2 hypotension   -Bp medications adjusted   -IVFs   11/8/18  -Improving Creatinine currently 2.2  -Continue gentle IV hydration    11/9/18  -Creatinine 1.8  11/10/18- trending down at 1.6- current plan of care continued      Chronic obstructive pulmonary disease    - Currently appears compensated  - Continue home meds  - Supplemental O2 PRN  - Monitor  -Stable      Coronary artery disease involving native coronary artery of native heart without angina pectoris    -Continue ASA and Lopressor  -Cardiac monitoring  -Stable        Type 2 diabetes mellitus with hyperglycemia, with long-term current use of insulin    - A1c 8.0 in September  -Glucose slightly uncontrolled  - Accu checks ACHS with SSI PRN  - add NPH at lower doses  - Monitor  -SSI dose increased to moderate   -Continue to monitor adjust insulin accordingly        Essential hypertension    - BP well controlled  - Continue home Norvasc and Metoprolol  - Monitor  -Blood pressure needing better control, bidil added   -Lisinopril added   -BP stabilized   11/7/18  -Was previously elevated   -Bidil and Lisinopril stopped and amlodipine dose decreased to 2.5 daily   -Bp still marginally low, IVFs   -Monitor   11/8/18  -Blood pressure improved   -Will continue to hold ACEI   11/9/18  -Monitor   11/10/18- controlled on prescribed regime       VTE Risk Mitigation (From admission, onward)        Ordered     IP VTE HIGH RISK PATIENT  Once      11/05/18 1555     Place sequential compression device  Until discontinued      11/05/18 1551              Mimi Mazariegos NP  Department of Hospital Medicine   [facility]

## 2018-11-10 NOTE — ASSESSMENT & PLAN NOTE
- patient is noted to be a poor historian and does not know what medications he takes at home  - per d/w Edmundo Hernandez today, he is currently prescribed Prednisone 5 mg daily, Cellcept 500 mg BID, and Prograf 1.5 mg BID  - Nephrology consulted  - Continue home Prednisone and Prograf for now  - Hold Cellcept per Dr. Staley  - Monitor Prograf levels   - Followed by Dr. Blanco in [place]   -Continue current medical management plan   -Nephrology following closely    -Creatinine increased to 2.6 most likely due to hypotension   -Gentle IV hydration    -Creatinine improved -1.6 11/10/18

## 2018-11-10 NOTE — ASSESSMENT & PLAN NOTE
- with delayed wound healing secondary to non-compliance with podiatry recommendations (patient reports getting foot wet and bearing weight)  - Defer care to podiatry  -wound cultures growing Enterobacter and Enterococcus  -scheduled for wound closure on Monday 11/5/18  -Infectious Disease following  -will need LTAC vs SNF  -Will continue zyvox and zosyn   11/4/18  -Surgery planned for tomorrow  -ID following  -IV zosyn discontinued   -Continue Zyvox po and IV Rocephin added   11/5/18  -Awaiting surgery for wound closure   11/6/18  -S/p Left foot Secondary Wound Closure and debridement of Bone/Transmetatarsal Amputation by Dr. Wheeler.   -Patient spike fever of 101.4F, IS encouraged   - CXR pending   -Social work consult for discharge planning (SNF vs LTAC). Patient will need IV abx for 6 weeks   11/7/18  -CXR showed no acute findings   -WBCs remain negative, no fever in 24 hours   -hypotension today, but is afebrile, and WBC is not high  - Likely not septic, will monitor closely.  11/8/18  -Continue current plan   11/9/18  -Awaiting placement   11/10/18- Osteo suspected per pathology report per Podiatry. -PICC to be placed with discharge to [facility] anticipated

## 2018-11-10 NOTE — ASSESSMENT & PLAN NOTE
- Educated on the importance of adhering to medical treatment plan  - Patient has a limited support system  - Social work consult for DC planning (SNF vs LTAC)  -DC to [facility] anticipated on Monday 11/12/18

## 2018-11-10 NOTE — PLAN OF CARE
Problem: Patient Care Overview  Goal: Plan of Care Review  Outcome: Ongoing (interventions implemented as appropriate)  Patient remained free from injury. No c/o pain at this time. Calm. Watching TV. No distress noted. Oriented x3. Respirations even and non labored. IV patent and infusing. Bed locked and low.left LE elevated on pillows. Call light in reach. Safety measures in place. Will continue to monitor.contact precautions in place Reviewed plan of care. Patient verbalized understanding and teach back.    12hr chart check complete.

## 2018-11-10 NOTE — ASSESSMENT & PLAN NOTE
- BP well controlled  - Continue home Norvasc and Metoprolol  - Monitor  -Blood pressure needing better control, bidil added   -Lisinopril added   -BP stabilized   11/7/18  -Was previously elevated   -Bidil and Lisinopril stopped and amlodipine dose decreased to 2.5 daily   -Bp still marginally low, IVFs   -Monitor   11/8/18  -Blood pressure improved   -Will continue to hold ACEI   11/9/18  -Monitor   11/10/18- controlled on prescribed regime

## 2018-11-10 NOTE — ASSESSMENT & PLAN NOTE
Hx of kidney transplant   Continue prednisone and Prograf per Nephrology  Prograf level 7.0 on 11/7/18 11/9/18-Prograf level pending  11/10/18- Prograf level 5.6

## 2018-11-10 NOTE — NURSING
Patient ran a 6.8 second pause on the tele monitor. Assessed patient. No distress noted. Stated he must have fallen asleep while watching TV. Will continue to monitor.  VERO Davis notified.

## 2018-11-10 NOTE — ASSESSMENT & PLAN NOTE
-confirmed per pathology report  -Podiatry aware   -ID following   -IV antibiotics   -PICC line to be placed for long term therapy  -discharge to [facility]

## 2018-11-11 LAB
ANION GAP SERPL CALC-SCNC: 9 MMOL/L
BASOPHILS # BLD AUTO: 0.01 K/UL
BASOPHILS NFR BLD: 0.2 %
BUN SERPL-MCNC: 21 MG/DL
CALCIUM SERPL-MCNC: 9.7 MG/DL
CHLORIDE SERPL-SCNC: 107 MMOL/L
CO2 SERPL-SCNC: 22 MMOL/L
CREAT SERPL-MCNC: 1.5 MG/DL
DIFFERENTIAL METHOD: ABNORMAL
EOSINOPHIL # BLD AUTO: 0.1 K/UL
EOSINOPHIL NFR BLD: 1.1 %
ERYTHROCYTE [DISTWIDTH] IN BLOOD BY AUTOMATED COUNT: 15.9 %
EST. GFR  (AFRICAN AMERICAN): 56 ML/MIN/1.73 M^2
EST. GFR  (NON AFRICAN AMERICAN): 48 ML/MIN/1.73 M^2
GLUCOSE SERPL-MCNC: 79 MG/DL
HCT VFR BLD AUTO: 34 %
HGB BLD-MCNC: 10.6 G/DL
LYMPHOCYTES # BLD AUTO: 2.1 K/UL
LYMPHOCYTES NFR BLD: 40.4 %
MCH RBC QN AUTO: 28.4 PG
MCHC RBC AUTO-ENTMCNC: 31.2 G/DL
MCV RBC AUTO: 91 FL
MONOCYTES # BLD AUTO: 0.4 K/UL
MONOCYTES NFR BLD: 6.6 %
NEUTROPHILS # BLD AUTO: 2.7 K/UL
NEUTROPHILS NFR BLD: 51.7 %
PLATELET # BLD AUTO: 221 K/UL
PMV BLD AUTO: 9 FL
POCT GLUCOSE: 183 MG/DL (ref 70–110)
POCT GLUCOSE: 217 MG/DL (ref 70–110)
POCT GLUCOSE: 297 MG/DL (ref 70–110)
POCT GLUCOSE: 71 MG/DL (ref 70–110)
POTASSIUM SERPL-SCNC: 4.6 MMOL/L
RBC # BLD AUTO: 3.73 M/UL
SODIUM SERPL-SCNC: 138 MMOL/L
WBC # BLD AUTO: 5.27 K/UL

## 2018-11-11 PROCEDURE — 21400001 HC TELEMETRY ROOM: Mod: HCNC

## 2018-11-11 PROCEDURE — 25000003 PHARM REV CODE 250: Mod: HCNC | Performed by: NURSE PRACTITIONER

## 2018-11-11 PROCEDURE — 94761 N-INVAS EAR/PLS OXIMETRY MLT: CPT | Mod: HCNC

## 2018-11-11 PROCEDURE — 94640 AIRWAY INHALATION TREATMENT: CPT | Mod: HCNC

## 2018-11-11 PROCEDURE — 80048 BASIC METABOLIC PNL TOTAL CA: CPT | Mod: HCNC

## 2018-11-11 PROCEDURE — 25000003 PHARM REV CODE 250: Mod: HCNC | Performed by: FAMILY MEDICINE

## 2018-11-11 PROCEDURE — 25000242 PHARM REV CODE 250 ALT 637 W/ HCPCS: Mod: HCNC | Performed by: PODIATRIST

## 2018-11-11 PROCEDURE — 63600175 PHARM REV CODE 636 W HCPCS: Mod: HCNC | Performed by: HOSPITALIST

## 2018-11-11 PROCEDURE — 63600175 PHARM REV CODE 636 W HCPCS: Mod: HCNC | Performed by: INTERNAL MEDICINE

## 2018-11-11 PROCEDURE — 96372 THER/PROPH/DIAG INJ SC/IM: CPT | Mod: HCNC

## 2018-11-11 PROCEDURE — 36415 COLL VENOUS BLD VENIPUNCTURE: CPT | Mod: HCNC

## 2018-11-11 PROCEDURE — 85025 COMPLETE CBC W/AUTO DIFF WBC: CPT | Mod: HCNC

## 2018-11-11 PROCEDURE — 25000003 PHARM REV CODE 250: Mod: HCNC | Performed by: PODIATRIST

## 2018-11-11 PROCEDURE — 63600175 PHARM REV CODE 636 W HCPCS: Mod: HCNC | Performed by: NURSE PRACTITIONER

## 2018-11-11 PROCEDURE — 63600175 PHARM REV CODE 636 W HCPCS: Mod: HCNC | Performed by: PODIATRIST

## 2018-11-11 RX ORDER — MORPHINE SULFATE 2 MG/ML
2 INJECTION, SOLUTION INTRAMUSCULAR; INTRAVENOUS ONCE
Status: COMPLETED | OUTPATIENT
Start: 2018-11-11 | End: 2018-11-11

## 2018-11-11 RX ORDER — LINEZOLID 600 MG/1
600 TABLET, FILM COATED ORAL EVERY 12 HOURS
Status: DISCONTINUED | OUTPATIENT
Start: 2018-11-11 | End: 2018-11-13 | Stop reason: HOSPADM

## 2018-11-11 RX ORDER — HYDRALAZINE HYDROCHLORIDE 20 MG/ML
10 INJECTION INTRAMUSCULAR; INTRAVENOUS EVERY 8 HOURS PRN
Status: DISCONTINUED | OUTPATIENT
Start: 2018-11-11 | End: 2018-11-13 | Stop reason: HOSPADM

## 2018-11-11 RX ORDER — HYDROCODONE BITARTRATE AND ACETAMINOPHEN 5; 325 MG/1; MG/1
1 TABLET ORAL EVERY 4 HOURS PRN
Status: DISCONTINUED | OUTPATIENT
Start: 2018-11-11 | End: 2018-11-13 | Stop reason: HOSPADM

## 2018-11-11 RX ADMIN — PREDNISONE 5 MG: 5 TABLET ORAL at 09:11

## 2018-11-11 RX ADMIN — METOPROLOL TARTRATE 25 MG: 25 TABLET ORAL at 08:11

## 2018-11-11 RX ADMIN — ARFORMOTEROL TARTRATE 15 MCG: 15 SOLUTION RESPIRATORY (INHALATION) at 08:11

## 2018-11-11 RX ADMIN — LINEZOLID 600 MG: 600 TABLET, FILM COATED ORAL at 08:11

## 2018-11-11 RX ADMIN — BUDESONIDE 0.5 MG: 0.5 SUSPENSION RESPIRATORY (INHALATION) at 09:11

## 2018-11-11 RX ADMIN — GABAPENTIN 300 MG: 300 CAPSULE ORAL at 08:11

## 2018-11-11 RX ADMIN — HYDRALAZINE HYDROCHLORIDE 10 MG: 20 INJECTION INTRAMUSCULAR; INTRAVENOUS at 04:11

## 2018-11-11 RX ADMIN — TACROLIMUS 1.5 MG: 0.5 CAPSULE ORAL at 09:11

## 2018-11-11 RX ADMIN — TACROLIMUS 1.5 MG: 0.5 CAPSULE ORAL at 06:11

## 2018-11-11 RX ADMIN — INSULIN ASPART 2 UNITS: 100 INJECTION, SOLUTION INTRAVENOUS; SUBCUTANEOUS at 08:11

## 2018-11-11 RX ADMIN — INSULIN ASPART 2 UNITS: 100 INJECTION, SOLUTION INTRAVENOUS; SUBCUTANEOUS at 11:11

## 2018-11-11 RX ADMIN — SODIUM BICARBONATE 650 MG TABLET 2600 MG: at 08:11

## 2018-11-11 RX ADMIN — LINEZOLID 600 MG: 600 TABLET, FILM COATED ORAL at 03:11

## 2018-11-11 RX ADMIN — MORPHINE SULFATE 2 MG: 2 INJECTION, SOLUTION INTRAMUSCULAR; INTRAVENOUS at 11:11

## 2018-11-11 RX ADMIN — GABAPENTIN 300 MG: 300 CAPSULE ORAL at 03:11

## 2018-11-11 RX ADMIN — ASPIRIN 81 MG: 81 TABLET, COATED ORAL at 09:11

## 2018-11-11 RX ADMIN — HYDROCODONE BITARTRATE AND ACETAMINOPHEN 1 TABLET: 5; 325 TABLET ORAL at 10:11

## 2018-11-11 RX ADMIN — HYDROCODONE BITARTRATE AND ACETAMINOPHEN 1 TABLET: 5; 325 TABLET ORAL at 08:11

## 2018-11-11 RX ADMIN — HUMAN INSULIN 20 UNITS: 100 INJECTION, SUSPENSION SUBCUTANEOUS at 04:11

## 2018-11-11 RX ADMIN — SODIUM BICARBONATE 650 MG TABLET 2600 MG: at 09:11

## 2018-11-11 RX ADMIN — GABAPENTIN 300 MG: 300 CAPSULE ORAL at 09:11

## 2018-11-11 RX ADMIN — HYDROCODONE BITARTRATE AND ACETAMINOPHEN 1 TABLET: 5; 325 TABLET ORAL at 03:11

## 2018-11-11 RX ADMIN — LEVOFLOXACIN 750 MG: 750 INJECTION, SOLUTION INTRAVENOUS at 08:11

## 2018-11-11 NOTE — ASSESSMENT & PLAN NOTE
- BP well controlled  - Continue home Norvasc and Metoprolol  - Monitor  -Blood pressure needing better control, bidil added   -Lisinopril added   -BP stabilized   11/7/18  -Was previously elevated   -Bidil and Lisinopril stopped and amlodipine dose decreased to 2.5 daily   -Bp still marginally low, IVFs   -Monitor   11/8/18  -Blood pressure improved   -Will continue to hold ACEI   11/9/18  -Monitor   11/10/18- controlled on prescribed regime  11/11/18- current plan of care continued

## 2018-11-11 NOTE — SUBJECTIVE & OBJECTIVE
Interval History: pt stable overnight. Pt continues to report pain to LLE.  Morphine IV x 1 dose given per Podiatry for dressing change.  Norco continued.  PICC line to be placed in am. Zyvox and Levaquin continued.      Review of Systems   Constitutional: Negative for chills, diaphoresis, fatigue and fever.   HENT: Negative for hearing loss, mouth sores, sore throat, tinnitus and trouble swallowing.    Eyes: Negative for pain, discharge and redness.   Respiratory: Negative for apnea, cough, choking, chest tightness, shortness of breath, wheezing and stridor.    Cardiovascular: Negative for chest pain, palpitations and leg swelling.   Gastrointestinal: Negative for abdominal distention, abdominal pain, blood in stool, constipation, diarrhea, nausea, rectal pain and vomiting.   Endocrine: Negative for cold intolerance, heat intolerance, polydipsia, polyphagia and polyuria.   Genitourinary: Negative for difficulty urinating, dysuria, flank pain, frequency, hematuria and urgency.   Musculoskeletal: Negative for arthralgias, back pain, gait problem, joint swelling, neck pain and neck stiffness.   Skin: Positive for color change and wound. Negative for rash.   Allergic/Immunologic: Negative for food allergies.   Neurological: Negative for dizziness, tremors, seizures, syncope, speech difficulty, light-headedness and headaches.   Hematological: Negative for adenopathy. Does not bruise/bleed easily.   Psychiatric/Behavioral: Negative for agitation and confusion. The patient is not nervous/anxious.    All other systems reviewed and are negative.    Objective:     Vital Signs (Most Recent):  Temp: 97.6 °F (36.4 °C) (11/11/18 1225)  Pulse: 76 (11/11/18 1225)  Resp: 18 (11/11/18 1225)  BP: (!) 144/65 (11/11/18 1225)  SpO2: 98 % (11/11/18 1225) Vital Signs (24h Range):  Temp:  [97.6 °F (36.4 °C)-98.6 °F (37 °C)] 97.6 °F (36.4 °C)  Pulse:  [71-83] 76  Resp:  [14-18] 18  SpO2:  [93 %-98 %] 98 %  BP: (105-217)/(59-97) 144/65      Weight: 99.8 kg (220 lb 0.3 oz)  Body mass index is 30.69 kg/m².    Intake/Output Summary (Last 24 hours) at 11/11/2018 1412  Last data filed at 11/11/2018 1200  Gross per 24 hour   Intake 1590 ml   Output 1300 ml   Net 290 ml      Physical Exam   Constitutional: He is oriented to person, place, and time. He appears well-developed and well-nourished. No distress.   HENT:   Head: Normocephalic and atraumatic.   Mouth/Throat: Oropharynx is clear and moist.   Eyes: Conjunctivae and EOM are normal.   Neck: Normal range of motion. Neck supple. No JVD present.   Cardiovascular: Normal rate, regular rhythm, normal heart sounds and intact distal pulses. Exam reveals no gallop and no friction rub.   No murmur heard.  Pulmonary/Chest: Effort normal and breath sounds normal. No stridor. No respiratory distress. He has no wheezes. He has no rales. He exhibits no tenderness.   Abdominal: Soft. Bowel sounds are normal. He exhibits no distension and no mass. There is no tenderness. There is no rebound and no guarding.   Genitourinary:   Genitourinary Comments: Deferred   Musculoskeletal: Normal range of motion. He exhibits no edema or tenderness.   Soft cast in place to LLE. 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 No cranial nerve deficit.   Skin: Skin is warm and dry. No rash noted. He is not diaphoretic. No erythema.   LLE dressing CDI.    Psychiatric: He has a normal mood and affect. His speech is normal and behavior is normal. Judgment and thought content normal. Cognition and memory are normal.   Nursing note and vitals reviewed.      Significant Labs:   Blood Culture: No results for input(s): LABBLOO in the last 48 hours.  CBC:   Recent Labs   Lab 11/09/18  2007 11/10/18  0444 11/11/18  0500   WBC 5.56 4.81 5.27   HGB 10.1* 10.1* 10.6*   HCT 32.7* 32.8* 34.0*    210 221     CMP:   Recent Labs   Lab 11/09/18  2007 11/10/18  0444 11/11/18  0500   NA  135* 136 138   K 5.7* 4.7 4.6    106 107   CO2 23 22* 22*   * 128* 79   BUN 25* 22 21   CREATININE 1.9* 1.6* 1.5*   CALCIUM 9.3 9.2 9.7   ALBUMIN 2.4*  --   --    ANIONGAP 6* 8 9   EGFRNONAA 36* 45* 48*       Significant Imaging:

## 2018-11-11 NOTE — PROGRESS NOTES
BP elevated. No PRN orders. Informed Dr. Dominguez via secure chat. New orders noted. No s/s of acute distress noted. Patient denies chest pain, headache, SOB and numbness and tingling to extremities. Will continue to monitor.

## 2018-11-11 NOTE — PROGRESS NOTES
Subjective:     Patient ID: Lavelle Ladd is a 63 y.o. male.    Chief Complaint: Non-healing Wound Follow Up    Lavelle is a 63 y.o. male who is seen at bedside 6 days post op left foot TMA. Lavelle has a past medical history of DINORAH (acute kidney injury) (2016), Arthritis, Chronic obstructive pulmonary disease (2016), Coronary artery disease involving native coronary artery of native heart without angina pectoris (2016),  donor kidney transplant for DM 16, Diastolic heart failure, Encounter for blood transfusion, ESRD on RRT since 10/2013 (10/29/2013), GERD (gastroesophageal reflux disease), History of hepatitis C, s/p successful Rx w/ SVR12 - 2017 (2017), Hyperlipidemia, Hypertension, PIC line (peripherally inserted central catheter) flush, Prophylactic immunotherapy, Proteinuria, PVD (peripheral vascular disease) (2017), Renal hypertension, Type 2 diabetes mellitus with diabetic neuropathy, with long-term current use of insulin (2016), and Vitamin B12 deficiency. Patient does admit pain, but denies any nausea, vomiting, or chills.       Hemoglobin A1C   Date Value Ref Range Status   2018 8.0 (H) 4.0 - 5.6 % Final     Comment:     ADA Screening Guidelines:  5.7-6.4%  Consistent with prediabetes  >or=6.5%  Consistent with diabetes  High levels of fetal hemoglobin interfere with the HbA1C  assay. Heterozygous hemoglobin variants (HbS, HgC, etc)do  not significantly interfere with this assay.   However, presence of multiple variants may affect accuracy.     2018 10.2 (H) 4.0 - 5.6 % Final     Comment:     ADA Screening Guidelines:  5.7-6.4%  Consistent with prediabetes  >or=6.5%  Consistent with diabetes  High levels of fetal hemoglobin interfere with the HbA1C  assay. Heterozygous hemoglobin variants (HbS, HgC, etc)do  not significantly interfere with this assay.   However, presence of multiple variants may affect accuracy.     2018 9.9 (H) 4.0 - 5.6 %  Final     Comment:     ADA Screening Guidelines:  5.7-6.4%  Consistent with prediabetes  >or=6.5%  Consistent with diabetes  High levels of fetal hemoglobin interfere with the HbA1C  assay. Heterozygous hemoglobin variants (HbS, HgC, etc)do  not significantly interfere with this assay.   However, presence of multiple variants may affect accuracy.             Patient Active Problem List   Diagnosis    Renal hypertension    GERD (gastroesophageal reflux disease)    Peptic ulcer disease    Malignant HTN with heart disease, w/o CHF, with chronic kidney disease    Acidosis    Essential hypertension    Acute diastolic heart failure    Gastroparesis     donor kidney transplant for DM 16    Prophylactic immunotherapy    Adverse effect of glucocorticoids and synthetic analogues, sequela    Osteoarthritis of lumbar spine    Osteoarthritis of thoracic spine with myelopathy    Type 2 diabetes mellitus with hyperglycemia, with long-term current use of insulin    Coronary artery disease involving native coronary artery of native heart without angina pectoris    Hyperlipidemia    Chronic obstructive pulmonary disease    Ulnar neuropathy    Acute renal failure superimposed on stage 3 chronic kidney disease    DINORAH (acute kidney injury)    Reactive airway disease    Kidney transplant rejection    Type 2 diabetes mellitus with retinopathy, with long-term current use of insulin    Type 2 diabetes mellitus with diabetic neuropathy, with long-term current use of insulin    H/O amputation    Cavitary lesion of lung    Opacity of lung on imaging study    Bacteremia due to Gram-negative bacteria    ESBL (extended spectrum beta-lactamase) producing bacteria infection    History of hepatitis C, s/p successful Rx w/ SVR12 - 2017    Kidney transplant recipient    Intractable vomiting with nausea    PVD (peripheral vascular disease)    Chronic bilateral low back pain with left-sided sciatica     Diabetic polyneuropathy    Other chest pain    Disc disease, degenerative, lumbar or lumbosacral    Numbness and tingling    Adjustment insomnia    Lumbar stenosis with neurogenic claudication    Gangrene    Acute lumbar radiculopathy    Chronic lumbar radiculopathy    Gangrene of toe of left foot    Long term current use of immunosuppressive drug    Peripheral vascular disease    Diabetic foot infection    Failure to thrive in adult    Gangrene of left foot    Fracture of one rib of right side    MSSA bacteremia    Abscess of left foot    S/P transmetatarsal amputation of foot, left    Non compliance with medical treatment    Hx of right BKA    Immunosuppression    Encounter for medication review and counseling    Stage 3 chronic kidney disease    Osteomyelitis          Medication List      ASK your doctor about these medications    amLODIPine 2.5 MG tablet  Commonly known as:  NORVASC  Take 1 tablet (2.5 mg total) by mouth once daily.     aspirin 81 MG EC tablet  Commonly known as:  ECOTRIN  Take 1 tablet (81 mg total) by mouth once daily.     BD INSULIN SYRINGE ULTRA-FINE 1 mL 31 gauge x 5/16 Syrg  Generic drug:  insulin syringe-needle U-100  USE ONE AS DIRECTED FOUR TIMES DAILY WITH MEALS AND AT BEDTIME     blood sugar diagnostic Strp  1 each by Misc.(Non-Drug; Combo Route) route 3 (three) times daily.     blood-glucose meter kit  Use as instructed     budesonide-formoterol 160-4.5 mcg 160-4.5 mcg/actuation Hfaa  Commonly known as:  SYMBICORT  Inhale 2 puffs into the lungs every 12 (twelve) hours. Wash out mouth after using     ergocalciferol 50,000 unit Cap  Commonly known as:  ERGOCALCIFEROL  Take 1 capsule (50,000 Units total) by mouth every 7 days. Take on Mondays     gabapentin 300 MG capsule  Commonly known as:  NEURONTIN     HYDROcodone-acetaminophen  mg per tablet  Commonly known as:  NORCO  Take 1 tablet by mouth every 4 (four) hours as needed.     inhalation spacing  "device  Commonly known as:  BREATHERITE VALVED MDI CHAMBER  Use as directed for inhalation.     insulin  unit/mL injection  Commonly known as:  NovoLIN N NPH U-100 Insulin  Take 25 units subq with breakfast and 15 units at bedtime     lancets Misc  1 each by Misc.(Non-Drug; Combo Route) route 3 (three) times daily.     metoprolol tartrate 25 MG tablet  Commonly known as:  LOPRESSOR  Take 1 tablet (25 mg total) by mouth 2 (two) times daily.     mycophenolate 500 mg Tab  Commonly known as:  CELLCEPT     NovoLIN R Regular U-100 Insuln 100 unit/mL injection  Generic drug:  insulin regular  INJECT 6 UNITS WITH BREAKFAST AND 8 UNITS WITH DINNER, SUBCUTANEOUSLY 30 MIN BEFORE MEAL.     nozaseptin nasal   Commonly known as:  NOZIN  1 each by Each Nare route 2 (two) times daily.     ondansetron 4 MG Tbdl  Commonly known as:  ZOFRAN-ODT  Take 1 tablet (4 mg total) by mouth every 8 (eight) hours as needed.     * pen needle, diabetic 32 gauge x 5/32" Ndle  Commonly known as:  EASY COMFORT PEN NEEDLES  Inject 1 each into the skin 3 (three) times daily.     * BD ULTRA-FINE SHORT PEN NEEDLE 31 gauge x 5/16" Ndle  Generic drug:  pen needle, diabetic     predniSONE 5 MG tablet  Commonly known as:  DELTASONE  Take 1 tablet (5 mg total) by mouth once daily.     sodium bicarbonate 650 MG tablet  Take 4 tablets (2,600 mg total) by mouth 2 (two) times daily.     tacrolimus 0.5 MG Cap  Commonly known as:  PROGRAF  Take 3 capsules (1.5 mg total) by mouth every 12 (twelve) hours. Z94.0 Kidney Transplant         * This list has 2 medication(s) that are the same as other medications prescribed for you. Read the directions carefully, and ask your doctor or other care provider to review them with you.                Review of patient's allergies indicates:  No Known Allergies    Past Surgical History:   Procedure Laterality Date    AMPUTATION, FOOT, TRANSMETATARSAL Left 11/5/2018    Performed by [facility] at [facility] " OR    AMPUTATION, FOOT, TRANSMETATARSAL Left 10/31/2018    Performed by Karla Wheeler DPM at [facility] OR    AMPUTATION, FOOT, TRANSMETATARSAL Left 9/21/2018    Performed by Karla Wheeler DPM at [facility] OR    AORTOGRAPHY WITH SERIALOGRAPHY N/A 6/14/2018    Procedure: LEFT LEG ANGIOGRAM;  Surgeon: Donal Mcdonald MD;  Location: [facility] CATH LAB;  Service: Vascular;  Laterality: N/A;    av bovine graft      Left UE    AV FISTULA PLACEMENT      left UE    BIOPSY Tranplanted Kidney N/A 8/15/2016    Performed by Paulette Hanna MD at [facility] OR 2ND FLR    BIOPSY, LIVER, TRANSJUGULAR APPROACH N/A 4/24/2014    Performed by [facility] Provider at [facility] CATH LAB    CARDIAC CATHETERIZATION  02/2015    CLOSURE OF WOUND Left 9/24/2018    Procedure: CLOSURE, WOUND;  Surgeon: Karla Wheeler DPM;  Location: [facility] OR;  Service: Podiatry;  Laterality: Left;  Secondary Wound closure, extensive    CLOSURE OF WOUND Left 11/5/2018    Procedure: CLOSURE, WOUND;  Surgeon: Karla Wheeler DPM;  Location: [facility] OR;  Service: Podiatry;  Laterality: Left;    CLOSURE, WOUND Left 11/5/2018    Performed by Karla Wheeler DPM at [facility] OR    CLOSURE, WOUND Left 9/24/2018    Performed by Karla Wheeler DPM at [facility] OR    DEBRIDEMENT OF MULTIPLE METATARSAL BONES Left 11/5/2018    Procedure: DEBRIDEMENT, METATARSAL BONE, 2 OR MORE BONES;  Surgeon: Karla Wheeler DPM;  Location: [facility] OR;  Service: Podiatry;  Laterality: Left;    DEBRIDEMENT, METATARSAL BONE, 2 OR MORE BONES Left 11/5/2018    Performed by Karla Wheeler DPM at [facility] OR    FOOT AMPUTATION THROUGH METATARSAL Left 9/21/2018    Procedure: AMPUTATION, FOOT, TRANSMETATARSAL;  Surgeon: Karla Wheeler DPM;  Location: [facility] OR;  Service: Podiatry;  Laterality: Left;    FOOT AMPUTATION THROUGH METATARSAL Left 10/31/2018    Procedure: AMPUTATION, FOOT, TRANSMETATARSAL;  Surgeon: Karla Wheeler DPM;  Location: [facility];  Service: Podiatry;  Laterality:  Left;    FOOT AMPUTATION THROUGH METATARSAL Left 11/5/2018    Procedure: AMPUTATION, FOOT, TRANSMETATARSAL;  Surgeon: Karla Wheeler DPM;  Location: [facility] OR;  Service: Podiatry;  Laterality: Left;  revisional transmetatarsal amputation, Left foot    INSERTION, CATHETER, VASCULAR N/A 10/31/2013    Performed by Ralph Mckeon MD at [facility] CATH LAB    IRRIGATION AND DEBRIDEMENT Left 7/9/2018    Performed by Katerina Magaña DPM at [facility] OR    IRRIGATION AND DEBRIDEMENT OF LOWER EXTREMITY Left 10/31/2018    Procedure: IRRIGATION AND DEBRIDEMENT, LOWER EXTREMITY;  Surgeon: Karla Wheeler DPM;  Location: [facility] OR;  Service: Podiatry;  Laterality: Left;    IRRIGATION AND DEBRIDEMENT, LOWER EXTREMITY Left 10/31/2018    Performed by Karla Wheeler DPM at [facility] OR    KIDNEY TRANSPLANT  05/21/2016    LEFT LEG ANGIOGRAM N/A 6/14/2018    Performed by Donal Mcdonald MD at [facility] CATH LAB    LEG AMPUTATION THROUGH KNEE  2011    right LE, started as nail puncture leading to diabetic ulcer    LENGTHENING, TENDON, ACHILLES Left 9/24/2018    Performed by Karla Wheeler DPM at [facility] OR    TRANSPLANT-KIDNEY Right 5/21/2016    Performed by Ronny Bergeron MD at [facility] OR [address] [place] [address]       Family History   Problem Relation Age of Onset    Cancer Father     Diabetes Father     Heart failure Father     Stroke Father     Heart failure Mother     Kidney disease Neg Hx        Social History     Socioeconomic History    Marital status: Single     Spouse name: Not on file    Number of children: Not on file    Years of education: Not on file    Highest education level: Not on file   Social Needs    Financial resource strain: Not on file    Food insecurity - worry: Not on file    Food insecurity - inability: Not on file    Transportation needs - medical: Not on file    Transportation needs - non-medical: Not on file   Occupational History    Occupation: Disabled     Employer: DISABLED   Tobacco Use    Smoking  status: Former Smoker     Packs/day: 1.00     Years: 40.00     Pack years: 40.00     Last attempt to quit: 2013     Years since quittin.8    Smokeless tobacco: Never Used   Substance and Sexual Activity    Alcohol use: Yes     Alcohol/week: 3.6 oz     Types: 6 Cans of beer per week     Comment: 6 beers/week    Drug use: No    Sexual activity: No   Other Topics Concern    Not on file   Social History Narrative    Lives alone. Retired from construction and various jobs, now retired. Would like to return to work PT to alleviate boredom.       Vitals:    18 0724 18 0855 18 0900 18 0919   BP: 136/63   (!) 105/59   Pulse: 72 75 75 77   Resp:     Temp: 98.6 °F (37 °C)      TempSrc: Oral      SpO2: 95% 95% 95%    Weight:       Height:           Hemoglobin A1C   Date Value Ref Range Status   2018 8.0 (H) 4.0 - 5.6 % Final     Comment:     ADA Screening Guidelines:  5.7-6.4%  Consistent with prediabetes  >or=6.5%  Consistent with diabetes  High levels of fetal hemoglobin interfere with the HbA1C  assay. Heterozygous hemoglobin variants (HbS, HgC, etc)do  not significantly interfere with this assay.   However, presence of multiple variants may affect accuracy.     2018 10.2 (H) 4.0 - 5.6 % Final     Comment:     ADA Screening Guidelines:  5.7-6.4%  Consistent with prediabetes  >or=6.5%  Consistent with diabetes  High levels of fetal hemoglobin interfere with the HbA1C  assay. Heterozygous hemoglobin variants (HbS, HgC, etc)do  not significantly interfere with this assay.   However, presence of multiple variants may affect accuracy.     2018 9.9 (H) 4.0 - 5.6 % Final     Comment:     ADA Screening Guidelines:  5.7-6.4%  Consistent with prediabetes  >or=6.5%  Consistent with diabetes  High levels of fetal hemoglobin interfere with the HbA1C  assay. Heterozygous hemoglobin variants (HbS, HgC, etc)do  not significantly interfere with this assay.   However, presence of  multiple variants may affect accuracy.         Review of Systems   Constitutional: Negative for chills and fever.   Respiratory: Negative for shortness of breath.    Cardiovascular: Negative for chest pain, palpitations, orthopnea, claudication and leg swelling.   Gastrointestinal: Negative for diarrhea, nausea and vomiting.   Musculoskeletal: Negative for joint pain.   Skin: Negative for rash.   Neurological: Positive for sensory change. Negative for dizziness, tingling, focal weakness and weakness.   Psychiatric/Behavioral: Negative.            Objective:      Physical examination: General: Patient is in no acute distress, alert and oriented x 3.  Dressing to left foot clean, dry, and intact.   Lower Extremity Exam:  Vascular: Dorsalis pedis and Posterior tibial pulses palpable on left foot.  Capillary fill time <5 sec to TMA site on left foot. No edema noted on left foot.   Dermatologic: Sutures Intact. Incision site well copated on left foot. Negative erythema, drainage, or increased temp noted to surgical site.   Neurological: Light touch sensation intact to left foot.   Musculoskeletal: Positive tenderness on palpation/ROM of left foot.                       Assessment:       Peripheral vascular disease  -     Cancel: Comprehensive metabolic panel; Standing  -     Cancel: CBC auto differential; Standing  -     Cancel: Sedimentation rate; Standing  -     Cancel: C-reactive protein; Standing  -     EKG 12-lead; Standing  -     Blood culture; Standing  -     Blood culture; Standing  -     C-reactive protein; Standing  -     CBC auto differential; Standing  -     Comprehensive metabolic panel; Standing  -     Sedimentation rate; Standing    Diabetic foot infection  -     Cancel: Comprehensive metabolic panel; Standing  -     Cancel: CBC auto differential; Standing  -     Cancel: Sedimentation rate; Standing  -     Cancel: C-reactive protein; Standing  -     EKG 12-lead; Standing  -     Blood culture; Standing  -      Blood culture; Standing  -     C-reactive protein; Standing  -     CBC auto differential; Standing  -     Comprehensive metabolic panel; Standing  -     Sedimentation rate; Standing    Gangrene of left foot  -     Cancel: Diet NPO; Standing  -     X-Ray Foot 2 View Left; Standing  -     Vital signs; Standing  -     Cancel: Diet NPO; Standing  -     Consult to Anesthesiology; Standing    Abscess of left foot  -     Transfer patient; Standing  -     Cancel: Notify Physician - Potential Need of Opioid Reversal; Standing  -     Cancel: Diet diabetic [facility]; 1500 Calorie; Standing  -     X-Ray Foot 2 View Left; Standing  -     Misc nursing order (specify); Standing  -     Case Request Operating Room: SECONDARY WOUND CLOSURE, LEFT FOOT; Standing  -     Inpatient consult to Social Work; Standing  -     Misc nursing order (specify); Standing  -     Transfer patient; Standing  -     Cancel: Notify Physician - Potential Need of Opioid Reversal; Standing  -     Cancel: Pulse Oximetry Q4H; Standing  -     Diet diabetic [facility]; 2000 Calorie; Standing  -     Place sequential compression device; Standing  -     X-Ray Foot Complete Left; Standing    S/P transmetatarsal amputation of foot, left  -     Case Request Operating Room: Incision and Drainage, Left Foot  Possible Revisional Amputation- Transmetatarsal  -     Transfer patient; Standing  -     Cancel: Notify Physician - Potential Need of Opioid Reversal; Standing  -     Cancel: Diet diabetic [facility]; 1500 Calorie; Standing  -     X-Ray Foot 2 View Left; Standing  -     Misc nursing order (specify); Standing  -     Case Request Operating Room: SECONDARY WOUND CLOSURE, LEFT FOOT; Standing  -     Inpatient consult to Social Work; Standing  -     Misc nursing order (specify); Standing  -     Transfer patient; Standing  -     Cancel: Notify Physician - Potential Need of Opioid Reversal; Standing  -     Cancel: Pulse Oximetry Q4H; Standing  -     Diet  diabetic [facility]; 2000 Calorie; Standing  -     Place sequential compression device; Standing  -     X-Ray Foot Complete Left; Standing    Cellulitis of left foot  -     Case Request Operating Room: Incision and Drainage, Left Foot  Possible Revisional Amputation- Transmetatarsal    Essential hypertension    Hx of right BKA    Kidney transplant recipient    Immunosuppression    Encounter for medication review and counseling    Stage 3 chronic kidney disease    DINORAH (acute kidney injury)    Bradycardia  -     EKG 12-lead; Standing    Other orders  -     Cancel: Admit to Phase 1 PACU, transfer to Phase 2 per protocol when indicated ; Standing  -     Cancel: Vital signs; Standing  -     Intake and output Per protocol; Standing  -     Cancel: Apply warming blanket; Standing  -     Notify Anesthesiologist; Standing  -     Notify Physician - Potential Need of Opioid Reversal; Standing  -     Discontinue: sodium chloride 0.9% flush 3 mL  -     Discontinue: sodium chloride 0.9% flush 3 mL  -     Cancel: Oxygen Continuous; Standing  -     Cancel: Pulse Oximetry Continuous; Standing  -     Cancel: maintenance fluids per service; Standing  -     Discontinue: fentaNYL injection 25 mcg  -     meperidine injection 12.5 mg  -     Discontinue: promethazine (PHENERGAN) 6.25 mg in dextrose 5 % 50 mL IVPB  -     Admit to Inpatient; Standing  -     Cancel: Cardiac Monitoring - Adult; Standing  -     Notify Physician; Standing  -     Discontinue: sodium chloride 0.9% flush 5 mL  -     Insert saline lock; Standing  -     Cancel: IP VTE HIGH RISK PATIENT; Standing  -     Discontinue: glucose chewable tablet 16 g  -     Discontinue: glucose chewable tablet 24 g  -     dextrose 50% injection 12.5 g  -     dextrose 50% injection 25 g  -     glucagon (human recombinant) injection 1 mg  -     Recheck Blood Glucose:; Standing  -     Cancel: Comprehensive Metabolic Panel (CMP); Standing  -     Magnesium; Standing  -     Phosphorus;  Standing  -     Cancel: CBC with Automated Differential; Standing  -     PT/INR; Standing  -     Pulse Oximetry Q4H; Standing  -     Full code; Standing  -     Cancel: Place sequential compression device; Standing  -     ondansetron disintegrating tablet 8 mg  -     ondansetron injection 4 mg  -     Discontinue: insulin aspart U-100 pen 0-5 Units  -     Cancel: Blood culture; Standing  -     Cancel: Blood culture; Standing  -     Procalcitonin; Standing  -     Lactic acid, plasma; Standing  -     Discontinue: amLODIPine tablet 2.5 mg  -     Discontinue: aspirin EC tablet 81 mg  -     Discontinue: fluticasone-vilanterol 100-25 mcg/dose diskus inhaler 1 puff  -     Discontinue: metoprolol tartrate (LOPRESSOR) tablet 25 mg  -     predniSONE tablet 5 mg  -     sodium bicarbonate tablet 2,600 mg  -     Discontinue: tacrolimus capsule 1.5 mg  -     Inpatient consult to Nephrology; Standing  -     Cancel: Tacrolimus level; Standing  -     budesonide nebulizer solution 0.5 mg  -     Inhalation Treatment Daily; Standing  -     arformoterol nebulizer solution 15 mcg  -     Inhalation Treatment Q12H; Standing  -     Inpatient consult to Infectious Diseases; Standing  -     Inpatient consult to Podiatry; Standing  -     Discontinue: tacrolimus capsule 1.5 mg  -     Cancel: Pharmacy to dose Vancomycin consult; Standing  -     Discontinue: piperacillin-tazobactam 4.5 g in dextrose 5 % 100 mL IVPB (ready to mix system)  -     Discontinue: HYDROcodone-acetaminophen  mg per tablet 1 tablet  -     gabapentin capsule 300 mg  -     nozaseptin (NOZIN) nasal   -     POCT glucose; Standing  -     POCT glucose; Standing  -     tacrolimus capsule 1.5 mg  -     CBC auto differential; Standing  -     Basic metabolic panel; Standing  -     hydrALAZINE injection 5 mg  -     Tacrolimus level; Standing  -     morphine injection 2 mg  -     Discontinue: cefazolin (ANCEF) 2 gram in dextrose 5% 50 mL IVPB (premix)  -     morphine  injection 2 mg  -     Discontinue: lidocaine (PF) 10 mg/ml (1%) injection  -     Discontinue: bupivacaine (PF) injection  -     Aerobic culture; Standing  -     Culture, Anaerobic; Standing  -     Cancel: IP VTE HIGH RISK PATIENT; Standing  -     Cancel: maintenance fluids per service; Standing  -     Discontinue: sodium chloride 0.9% flush 3 mL  -     Cancel: Notify Physician - Potential Need of Opioid Reversal; Standing  -     vancomycin (VANCOCIN) 1,250 mg in dextrose 5 % 250 mL IVPB  -     Discontinue: vancomycin (VANCOCIN) 1,250 mg in dextrose 5 % 250 mL IVPB  -     Cancel: VANCOMYCIN, TROUGH before 3rd dose; Standing  -     meperidine (PF) injection 12.5 mg  -     POCT glucose; Standing  -     Discontinue: hydrALAZINE tablet 25 mg  -     Administer oral analgesic spray; Standing  -     Contact isolation status; Standing  -     POCT glucose; Standing  -     Cancel: POCT glucose; Standing  -     Turn patient every 2 hours; Standing  -     POCT glucose; Standing  -     POCT glucose; Standing  -     Discontinue: insulin NPH injection 10 Units  -     CARDIAC MONITORING STRIPS  -     linezolid tablet 600 mg  -     POCT glucose; Standing  -     Discontinue: morphine injection 1 mg  -     POCT glucose; Standing  -     POCT glucose; Standing  -     Discontinue: amLODIPine tablet 5 mg  -     POCT glucose; Standing  -     insulin NPH injection 20 Units  -     cloNIDine tablet 0.2 mg  -     POCT glucose; Standing  -     hydrALAZINE tablet 25 mg  -     Discontinue: amLODIPine tablet 10 mg  -     POCT glucose; Standing  -     Discontinue: cefTRIAXone (ROCEPHIN) 1 g in dextrose 5 % 50 mL IVPB  -     Discontinue: hydrALAZINE tablet 25 mg  -     Discontinue: isosorbide-hydrALAZINE 20-37.5 mg per tablet 1 tablet  -     POCT glucose; Standing  -     glucose chewable tablet 16 g  -     glucose chewable tablet 24 g  -     Recheck Blood Glucose:; Standing  -     POCT glucose; Standing  -     If any glucose result is less than 50 or  greater than 400:; Standing  -     If 2nd result is less than 50 or greater than 400:; Standing  -     Do not admin Aspart correction between scheduled prandial Aspart; Standing  -     insulin aspart U-100 pen 1-10 Units  -     POCT glucose; Standing  -     POCT glucose; Standing  -     morphine injection 2 mg  -     POCT glucose; Standing  -     Discontinue: sulfamethoxazole-trimethoprim 800-160mg per tablet 1 tablet  -     Discontinue: lisinopril tablet 10 mg  -     POCT glucose; Standing  -     Cancel: Admit to Phase 1 PACU, transfer to Phase 2 per protocol when indicated ; Standing  -     Cancel: Vital signs; Standing  -     Cancel: Apply warming blanket; Standing  -     Notify Anesthesiologist; Standing  -     Cancel: Notify Physician - Potential Need of Opioid Reversal; Standing  -     maintenance fluids per service; Standing  -     Discontinue: sodium chloride 0.9% flush 3 mL  -     sodium chloride 0.9% flush 3 mL  -     Discontinue: oxyCODONE immediate release tablet 5 mg  -     Discontinue: morphine injection 2 mg  -     meperidine injection 12.5 mg  -     Discontinue: metoclopramide HCl injection 10 mg  -     Cancel: Oxygen Continuous; Standing  -     Cancel: Pulse Oximetry Continuous; Standing  -     Discontinue: bupivacaine (PF) injection  -     Discontinue: lidocaine (PF) 10 mg/ml (1%) injection  -     Specimen to Pathology - Surgery; Standing  -     aspirin EC tablet 81 mg  -     IP VTE HIGH RISK PATIENT; Standing  -     Weight bearing Non Weight Bearing; Lower Extremity; Left; Standing  -     Cancel: Admit to Phase 1 PACU, transfer to Phase 2 per protocol when indicated ; Standing  -     Discontinue: sodium chloride 0.9% flush 3 mL  -     Discontinue: meperidine injection 12.5 mg  -     metoclopramide HCl injection 10 mg  -     Discontinue: morphine injection 2 mg  -     Discontinue: oxyCODONE immediate release tablet 5 mg  -     Cancel: Apply warming blanket; Standing  -     Notify Anesthesiologist;  Standing  -     Oxygen Continuous; Standing  -     Cancel: Pulse Oximetry Continuous; Standing  -     Cancel: Vital signs; Standing  -     POCT glucose; Standing  -     Discontinue: promethazine (PHENERGAN) 6.25 mg in dextrose 5 % 50 mL IVPB  -     POCT glucose; Standing  -     0.45% NaCl infusion  -     benzonatate capsule 100 mg  -     POCT glucose; Standing  -     POCT glucose; Standing  -     Discontinue: acetaminophen oral solution 650 mg  -     Incentive spirometry; Standing  -     acetaminophen tablet 650 mg  -     X-Ray Chest AP Portable; Standing  -     POCT glucose; Standing  -     POCT glucose; Standing  -     POCT glucose; Standing  -     Discontinue: naloxone 0.4 mg/mL injection 0.4 mg  -     sodium chloride 0.9% bolus 500 mL  -     Discontinue: lisinopril tablet 2.5 mg  -     POCT glucose; Standing  -     Discontinue: levoFLOXacin tablet 500 mg  -     Discontinue: levoFLOXacin tablet 750 mg  -     0.9%  NaCl infusion  -     Sodium, urine, random; Standing  -     Creatinine, urine, random; Standing  -     Discontinue: amLODIPine tablet 5 mg  -     Discontinue: amLODIPine tablet 2.5 mg  -     Blood culture; Standing  -     Blood culture; Standing  -     POCT glucose; Standing  -     POCT glucose; Standing  -     POCT glucose; Standing  -     POCT glucose; Standing  -     CT Chest Without Contrast; Standing  -     HYDROcodone-acetaminophen 5-325 mg per tablet 1 tablet  -     Cardiac Monitoring - Adult; Standing  -     POCT glucose; Standing  -     POCT glucose; Standing  -     POCT glucose; Standing  -     Discontinue: amLODIPine tablet 10 mg  -     amLODIPine tablet 10 mg  -     POCT glucose; Standing  -     POCT glucose; Standing  -     Tacrolimus level; Standing  -     POCT glucose; Standing  -     POCT glucose; Standing  -     Phosphorus; Standing  -     Renal function panel; Standing  -     CBC auto differential; Standing  -     Magnesium; Standing  -     metoprolol tartrate (LOPRESSOR) tablet 25  mg  -     POCT glucose; Standing  -     POCT glucose; Standing  -     Discontinue: levoFLOXacin (LEVAQUIN) 750 mg in dextrose 5 % 150 mL IVPB  -     levoFLOXacin 750 mg/150 mL IVPB 750 mg  -     POCT glucose; Standing  -     POCT glucose; Standing  -     hydrALAZINE injection 10 mg  -     POCT glucose; Standing  -     morphine injection 2 mg            Plan:   Peripheral vascular disease  -     Cancel: Comprehensive metabolic panel; Standing  -     Cancel: CBC auto differential; Standing  -     Cancel: Sedimentation rate; Standing  -     Cancel: C-reactive protein; Standing  -     EKG 12-lead; Standing  -     Blood culture; Standing  -     Blood culture; Standing  -     C-reactive protein; Standing  -     CBC auto differential; Standing  -     Comprehensive metabolic panel; Standing  -     Sedimentation rate; Standing    Diabetic foot infection  -     Cancel: Comprehensive metabolic panel; Standing  -     Cancel: CBC auto differential; Standing  -     Cancel: Sedimentation rate; Standing  -     Cancel: C-reactive protein; Standing  -     EKG 12-lead; Standing  -     Blood culture; Standing  -     Blood culture; Standing  -     C-reactive protein; Standing  -     CBC auto differential; Standing  -     Comprehensive metabolic panel; Standing  -     Sedimentation rate; Standing    Gangrene of left foot  -     Cancel: Diet NPO; Standing  -     X-Ray Foot 2 View Left; Standing  -     Vital signs; Standing  -     Cancel: Diet NPO; Standing  -     Consult to Anesthesiology; Standing    Abscess of left foot  -     Transfer patient; Standing  -     Cancel: Notify Physician - Potential Need of Opioid Reversal; Standing  -     Cancel: Diet diabetic [facility]; 1500 Calorie; Standing  -     X-Ray Foot 2 View Left; Standing  -     Misc nursing order (specify); Standing  -     Case Request Operating Room: SECONDARY WOUND CLOSURE, LEFT FOOT; Standing  -     Inpatient consult to Social Work; Standing  -     Misc nursing order  (specify); Standing  -     Transfer patient; Standing  -     Cancel: Notify Physician - Potential Need of Opioid Reversal; Standing  -     Cancel: Pulse Oximetry Q4H; Standing  -     Diet diabetic [facility]; 2000 Calorie; Standing  -     Place sequential compression device; Standing  -     X-Ray Foot Complete Left; Standing    S/P transmetatarsal amputation of foot, left  -     Case Request Operating Room: Incision and Drainage, Left Foot  Possible Revisional Amputation- Transmetatarsal  -     Transfer patient; Standing  -     Cancel: Notify Physician - Potential Need of Opioid Reversal; Standing  -     Cancel: Diet diabetic [facility]; 1500 Calorie; Standing  -     X-Ray Foot 2 View Left; Standing  -     Misc nursing order (specify); Standing  -     Case Request Operating Room: SECONDARY WOUND CLOSURE, LEFT FOOT; Standing  -     Inpatient consult to Social Work; Standing  -     Misc nursing order (specify); Standing  -     Transfer patient; Standing  -     Cancel: Notify Physician - Potential Need of Opioid Reversal; Standing  -     Cancel: Pulse Oximetry Q4H; Standing  -     Diet diabetic [facility]; 2000 Calorie; Standing  -     Place sequential compression device; Standing  -     X-Ray Foot Complete Left; Standing    Cellulitis of left foot  -     Case Request Operating Room: Incision and Drainage, Left Foot  Possible Revisional Amputation- Transmetatarsal    Essential hypertension    Hx of right BKA    Kidney transplant recipient    Immunosuppression    Encounter for medication review and counseling    Stage 3 chronic kidney disease    DINORAH (acute kidney injury)    Bradycardia  -     EKG 12-lead; Standing    Other orders  -     Cancel: Admit to Phase 1 PACU, transfer to Phase 2 per protocol when indicated ; Standing  -     Cancel: Vital signs; Standing  -     Intake and output Per protocol; Standing  -     Cancel: Apply warming blanket; Standing  -     Notify Anesthesiologist; Standing  -      Notify Physician - Potential Need of Opioid Reversal; Standing  -     Discontinue: sodium chloride 0.9% flush 3 mL  -     Discontinue: sodium chloride 0.9% flush 3 mL  -     Cancel: Oxygen Continuous; Standing  -     Cancel: Pulse Oximetry Continuous; Standing  -     Cancel: maintenance fluids per service; Standing  -     Discontinue: fentaNYL injection 25 mcg  -     meperidine injection 12.5 mg  -     Discontinue: promethazine (PHENERGAN) 6.25 mg in dextrose 5 % 50 mL IVPB  -     Admit to Inpatient; Standing  -     Cancel: Cardiac Monitoring - Adult; Standing  -     Notify Physician; Standing  -     Discontinue: sodium chloride 0.9% flush 5 mL  -     Insert saline lock; Standing  -     Cancel: IP VTE HIGH RISK PATIENT; Standing  -     Discontinue: glucose chewable tablet 16 g  -     Discontinue: glucose chewable tablet 24 g  -     dextrose 50% injection 12.5 g  -     dextrose 50% injection 25 g  -     glucagon (human recombinant) injection 1 mg  -     Recheck Blood Glucose:; Standing  -     Cancel: Comprehensive Metabolic Panel (CMP); Standing  -     Magnesium; Standing  -     Phosphorus; Standing  -     Cancel: CBC with Automated Differential; Standing  -     PT/INR; Standing  -     Pulse Oximetry Q4H; Standing  -     Full code; Standing  -     Cancel: Place sequential compression device; Standing  -     ondansetron disintegrating tablet 8 mg  -     ondansetron injection 4 mg  -     Discontinue: insulin aspart U-100 pen 0-5 Units  -     Cancel: Blood culture; Standing  -     Cancel: Blood culture; Standing  -     Procalcitonin; Standing  -     Lactic acid, plasma; Standing  -     Discontinue: amLODIPine tablet 2.5 mg  -     Discontinue: aspirin EC tablet 81 mg  -     Discontinue: fluticasone-vilanterol 100-25 mcg/dose diskus inhaler 1 puff  -     Discontinue: metoprolol tartrate (LOPRESSOR) tablet 25 mg  -     predniSONE tablet 5 mg  -     sodium bicarbonate tablet 2,600 mg  -     Discontinue: tacrolimus capsule  1.5 mg  -     Inpatient consult to Nephrology; Standing  -     Cancel: Tacrolimus level; Standing  -     budesonide nebulizer solution 0.5 mg  -     Inhalation Treatment Daily; Standing  -     arformoterol nebulizer solution 15 mcg  -     Inhalation Treatment Q12H; Standing  -     Inpatient consult to Infectious Diseases; Standing  -     Inpatient consult to Podiatry; Standing  -     Discontinue: tacrolimus capsule 1.5 mg  -     Cancel: Pharmacy to dose Vancomycin consult; Standing  -     Discontinue: piperacillin-tazobactam 4.5 g in dextrose 5 % 100 mL IVPB (ready to mix system)  -     Discontinue: HYDROcodone-acetaminophen  mg per tablet 1 tablet  -     gabapentin capsule 300 mg  -     nozaseptin (NOZIN) nasal   -     POCT glucose; Standing  -     POCT glucose; Standing  -     tacrolimus capsule 1.5 mg  -     CBC auto differential; Standing  -     Basic metabolic panel; Standing  -     hydrALAZINE injection 5 mg  -     Tacrolimus level; Standing  -     morphine injection 2 mg  -     Discontinue: cefazolin (ANCEF) 2 gram in dextrose 5% 50 mL IVPB (premix)  -     morphine injection 2 mg  -     Discontinue: lidocaine (PF) 10 mg/ml (1%) injection  -     Discontinue: bupivacaine (PF) injection  -     Aerobic culture; Standing  -     Culture, Anaerobic; Standing  -     Cancel: IP VTE HIGH RISK PATIENT; Standing  -     Cancel: maintenance fluids per service; Standing  -     Discontinue: sodium chloride 0.9% flush 3 mL  -     Cancel: Notify Physician - Potential Need of Opioid Reversal; Standing  -     vancomycin (VANCOCIN) 1,250 mg in dextrose 5 % 250 mL IVPB  -     Discontinue: vancomycin (VANCOCIN) 1,250 mg in dextrose 5 % 250 mL IVPB  -     Cancel: VANCOMYCIN, TROUGH before 3rd dose; Standing  -     meperidine (PF) injection 12.5 mg  -     POCT glucose; Standing  -     Discontinue: hydrALAZINE tablet 25 mg  -     Administer oral analgesic spray; Standing  -     Contact isolation status; Standing  -      POCT glucose; Standing  -     Cancel: POCT glucose; Standing  -     Turn patient every 2 hours; Standing  -     POCT glucose; Standing  -     POCT glucose; Standing  -     Discontinue: insulin NPH injection 10 Units  -     CARDIAC MONITORING STRIPS  -     linezolid tablet 600 mg  -     POCT glucose; Standing  -     Discontinue: morphine injection 1 mg  -     POCT glucose; Standing  -     POCT glucose; Standing  -     Discontinue: amLODIPine tablet 5 mg  -     POCT glucose; Standing  -     insulin NPH injection 20 Units  -     cloNIDine tablet 0.2 mg  -     POCT glucose; Standing  -     hydrALAZINE tablet 25 mg  -     Discontinue: amLODIPine tablet 10 mg  -     POCT glucose; Standing  -     Discontinue: cefTRIAXone (ROCEPHIN) 1 g in dextrose 5 % 50 mL IVPB  -     Discontinue: hydrALAZINE tablet 25 mg  -     Discontinue: isosorbide-hydrALAZINE 20-37.5 mg per tablet 1 tablet  -     POCT glucose; Standing  -     glucose chewable tablet 16 g  -     glucose chewable tablet 24 g  -     Recheck Blood Glucose:; Standing  -     POCT glucose; Standing  -     If any glucose result is less than 50 or greater than 400:; Standing  -     If 2nd result is less than 50 or greater than 400:; Standing  -     Do not admin Aspart correction between scheduled prandial Aspart; Standing  -     insulin aspart U-100 pen 1-10 Units  -     POCT glucose; Standing  -     POCT glucose; Standing  -     morphine injection 2 mg  -     POCT glucose; Standing  -     Discontinue: sulfamethoxazole-trimethoprim 800-160mg per tablet 1 tablet  -     Discontinue: lisinopril tablet 10 mg  -     POCT glucose; Standing  -     Cancel: Admit to Phase 1 PACU, transfer to Phase 2 per protocol when indicated ; Standing  -     Cancel: Vital signs; Standing  -     Cancel: Apply warming blanket; Standing  -     Notify Anesthesiologist; Standing  -     Cancel: Notify Physician - Potential Need of Opioid Reversal; Standing  -     maintenance fluids per service;  Standing  -     Discontinue: sodium chloride 0.9% flush 3 mL  -     sodium chloride 0.9% flush 3 mL  -     Discontinue: oxyCODONE immediate release tablet 5 mg  -     Discontinue: morphine injection 2 mg  -     meperidine injection 12.5 mg  -     Discontinue: metoclopramide HCl injection 10 mg  -     Cancel: Oxygen Continuous; Standing  -     Cancel: Pulse Oximetry Continuous; Standing  -     Discontinue: bupivacaine (PF) injection  -     Discontinue: lidocaine (PF) 10 mg/ml (1%) injection  -     Specimen to Pathology - Surgery; Standing  -     aspirin EC tablet 81 mg  -     IP VTE HIGH RISK PATIENT; Standing  -     Weight bearing Non Weight Bearing; Lower Extremity; Left; Standing  -     Cancel: Admit to Phase 1 PACU, transfer to Phase 2 per protocol when indicated ; Standing  -     Discontinue: sodium chloride 0.9% flush 3 mL  -     Discontinue: meperidine injection 12.5 mg  -     metoclopramide HCl injection 10 mg  -     Discontinue: morphine injection 2 mg  -     Discontinue: oxyCODONE immediate release tablet 5 mg  -     Cancel: Apply warming blanket; Standing  -     Notify Anesthesiologist; Standing  -     Oxygen Continuous; Standing  -     Cancel: Pulse Oximetry Continuous; Standing  -     Cancel: Vital signs; Standing  -     POCT glucose; Standing  -     Discontinue: promethazine (PHENERGAN) 6.25 mg in dextrose 5 % 50 mL IVPB  -     POCT glucose; Standing  -     0.45% NaCl infusion  -     benzonatate capsule 100 mg  -     POCT glucose; Standing  -     POCT glucose; Standing  -     Discontinue: acetaminophen oral solution 650 mg  -     Incentive spirometry; Standing  -     acetaminophen tablet 650 mg  -     X-Ray Chest AP Portable; Standing  -     POCT glucose; Standing  -     POCT glucose; Standing  -     POCT glucose; Standing  -     Discontinue: naloxone 0.4 mg/mL injection 0.4 mg  -     sodium chloride 0.9% bolus 500 mL  -     Discontinue: lisinopril tablet 2.5 mg  -     POCT glucose; Standing  -      Discontinue: levoFLOXacin tablet 500 mg  -     Discontinue: levoFLOXacin tablet 750 mg  -     0.9%  NaCl infusion  -     Sodium, urine, random; Standing  -     Creatinine, urine, random; Standing  -     Discontinue: amLODIPine tablet 5 mg  -     Discontinue: amLODIPine tablet 2.5 mg  -     Blood culture; Standing  -     Blood culture; Standing  -     POCT glucose; Standing  -     POCT glucose; Standing  -     POCT glucose; Standing  -     POCT glucose; Standing  -     CT Chest Without Contrast; Standing  -     HYDROcodone-acetaminophen 5-325 mg per tablet 1 tablet  -     Cardiac Monitoring - Adult; Standing  -     POCT glucose; Standing  -     POCT glucose; Standing  -     POCT glucose; Standing  -     Discontinue: amLODIPine tablet 10 mg  -     amLODIPine tablet 10 mg  -     POCT glucose; Standing  -     POCT glucose; Standing  -     Tacrolimus level; Standing  -     POCT glucose; Standing  -     POCT glucose; Standing  -     Phosphorus; Standing  -     Renal function panel; Standing  -     CBC auto differential; Standing  -     Magnesium; Standing  -     metoprolol tartrate (LOPRESSOR) tablet 25 mg  -     POCT glucose; Standing  -     POCT glucose; Standing  -     Discontinue: levoFLOXacin (LEVAQUIN) 750 mg in dextrose 5 % 150 mL IVPB  -     levoFLOXacin 750 mg/150 mL IVPB 750 mg  -     POCT glucose; Standing  -     POCT glucose; Standing  -     hydrALAZINE injection 10 mg  -     POCT glucose; Standing  -     morphine injection 2 mg      I counseled the patient on his conditions, regarding findings of my examination, my impressions, and usual treatment plan.   Left foot dressed with Betadine soaked adaptic, gauze, kerlix, cast padding, posterior splint, ACE.   Patient instructed to keep dressing dry, clean and intact.   Patient instructed to continue to offload extremity with pillows.   Continue IV antibiotics.   Continue PT.  SNF placement pending.  Podiatry to continue to follow, dressing will be changed every 3  days until SNF placement.               Sharnette Miles, DPM [facility]

## 2018-11-11 NOTE — PLAN OF CARE
Problem: Patient Care Overview  Goal: Plan of Care Review  Outcome: Ongoing (interventions implemented as appropriate)  Pt remained free of injury during shift, stable condition, pain adequately controlled with PRN medication, no acute distress, receiving IV fluids, receiving antibiotics, contact precautions maintained, dressing to LLE changed by podiatry and CDI, receiving breathing treatments through respiratory therapy, blood glucose monitoring performed, on cardiac monitoring (SR, HR 70-90s), and will continue to monitor. 24hr chart review performed.

## 2018-11-11 NOTE — PROGRESS NOTES
[facility] Hospital Medicine  Progress Note    Patient Name: Lavelle Ladd  MRN: [id number]  Patient Class: IP- Inpatient   Admission Date: 10/31/2018  Length of Stay: 11 days  Attending Physician: Shelton Edgar MD  Primary Care Provider: Rishabh Esteban MD        Subjective:     Principal Problem:S/P transmetatarsal amputation of foot, left    HPI:  Lavelle Ladd is a 63 y.o. male  with a PMHx of COPD, CAD, Diastolic CHF, CKD, Renal transplant, GERD, Hepatitis C, HLD, HTN, PVD, and IDDM who presented to the hospital today as a direct admit for planned surgery today per Dr. Wheeler.  Left foot xray today showed amputation of the distal aspect of the left foot at the level of the metatarsal bones.  No lytic abnormalities to suggest osteomyelitis by plain radiograph.  No evidence of acute fracture or dislocation.  Bony mineralization is normal.  Diffuse soft tissue and dense vascular calcifications.  Large plantar calcaneal spur.  He is s/p left transmetatarsal amputation secondary to gangrene of multiple digits and JOSE R.  Patient underwent delayed primary wound closure, on 9/24/18.  He presented to podiatry today for his 2 week follow-up of wound closure 2/2 to wound dehiscence.  Patient missed his last postoperative visit as he states he was unable to get a ride to his appointment.  He also admits to ambulating on his amputation site.  He drove himself to clinic today as he was unable to get a ride.  He states that he has gotten his foot wet as he has taken showers covering extremity in a garbage bag.  He denies any fever, chills, foot pain, CP, SOB, N/V/D, and all other symptoms at this time.  Patient has a history of poor compliance and difficult living dispo as he lives alone.  In the past we did try to get patient admitted to skilled nursing facility, but he has refused such services.   Wound cx on 9/21 showed MSSA for which he has completed IV cefazolin for 14 days secondary to MSSA  gangrene.  He has been NPO since midnight.  He will be admitted to the Medicine unit under the care of Hospital Medicine.  ID consulted per podiatry recs to guide antibiotic therapy postoperatively.  He is a Full [employer].  His SDM is his sister Kecia Williamson who can be reached at [phone].                Hospital Course:  11/1/18 The patient is S/P I&D of left foot yesterday. No acute issues overnight. The patient reports that his pain is well controlled. Continue IV ABX, cultures pending. Infectious disease is following. 11/2/18 wound culture growing Enterococcus and Enterobacter. Podiatry recommends SNF vs LTAC placement due to potential for limb loss. 11/3/18- Will continue zyvox and zosyn. Creatinine level at baseline. Surgery planned for Monday by Dr. Wheeler for secondary wound closure. 11/4/18- Patient awake and alert today. Dr. Guthrie at bedside performing wound care to left transmetatarsal amputation stump. Patient tolerated well. Plan for surgery tomorrow. Continue zyvox, IV zosyn discontinued and IV Rocephin added. 11/5/18-Creatinine stable. Blood pressure elevate this morning, lisinopril added per nephrology. Awaiting surgery for wound closure. 11/6/18-  S/p Left foot Secondary Wound Closure and debridement of Bone/Transmetatarsal Amputation by Dr. Wheeler.  Patine spike fever this afternoon. WBCs remain negative. CXR pending. Encourage IS use. Infectious disease on board. Social work consult for discharge planning (SNF vs LTAC). Patient will need IV abx x 6 weeks. 11/7/18- Early morning patient became hypotensive. Bidil and Lisinopril discontinued, amlodipine decreased to 2.5 mg daily. Blood pressure still marginally low but better. Creatinine increased to 2.6, most likely due to hypotension. Continue IVFs. 11/8/18- Creatinine trending down, will continue gentle IVFs. CT chest negative for acute findings. 11/9/18-No change in status, will continue current management plan.  11/10/18- Osteomyelitis  diagnosed per pathology report-per Podiatry with long term IV antibiotics anticipated.  ID following.  CM to assist with placement with discharge to [facility] anticipated.  11/11/18-Dressing to LLE changed per Podiatry.  Creatinine improved with downward trend noted.  H/H stable.  Vital signs stable.  Zyvox and Levaquin continued.                 Interval History: pt stable overnight. Pt continues to report pain to LLE.  Morphine IV x 1 dose given per Podiatry for dressing change.  Norco continued.  PICC line to be placed in am. Zyvox and Levaquin continued.      Review of Systems   Constitutional: Negative for chills, diaphoresis, fatigue and fever.   HENT: Negative for hearing loss, mouth sores, sore throat, tinnitus and trouble swallowing.    Eyes: Negative for pain, discharge and redness.   Respiratory: Negative for apnea, cough, choking, chest tightness, shortness of breath, wheezing and stridor.    Cardiovascular: Negative for chest pain, palpitations and leg swelling.   Gastrointestinal: Negative for abdominal distention, abdominal pain, blood in stool, constipation, diarrhea, nausea, rectal pain and vomiting.   Endocrine: Negative for cold intolerance, heat intolerance, polydipsia, polyphagia and polyuria.   Genitourinary: Negative for difficulty urinating, dysuria, flank pain, frequency, hematuria and urgency.   Musculoskeletal: Negative for arthralgias, back pain, gait problem, joint swelling, neck pain and neck stiffness.   Skin: Positive for color change and wound. Negative for rash.   Allergic/Immunologic: Negative for food allergies.   Neurological: Negative for dizziness, tremors, seizures, syncope, speech difficulty, light-headedness and headaches.   Hematological: Negative for adenopathy. Does not bruise/bleed easily.   Psychiatric/Behavioral: Negative for agitation and confusion. The patient is not nervous/anxious.    All other systems reviewed and are negative.    Objective:     Vital Signs  (Most Recent):  Temp: 97.6 °F (36.4 °C) (11/11/18 1225)  Pulse: 76 (11/11/18 1225)  Resp: 18 (11/11/18 1225)  BP: (!) 144/65 (11/11/18 1225)  SpO2: 98 % (11/11/18 1225) Vital Signs (24h Range):  Temp:  [97.6 °F (36.4 °C)-98.6 °F (37 °C)] 97.6 °F (36.4 °C)  Pulse:  [71-83] 76  Resp:  [14-18] 18  SpO2:  [93 %-98 %] 98 %  BP: (105-217)/(59-97) 144/65     Weight: 99.8 kg (220 lb 0.3 oz)  Body mass index is 30.69 kg/m².    Intake/Output Summary (Last 24 hours) at 11/11/2018 1412  Last data filed at 11/11/2018 1200  Gross per 24 hour   Intake 1590 ml   Output 1300 ml   Net 290 ml      Physical Exam   Constitutional: He is oriented to person, place, and time. He appears well-developed and well-nourished. No distress.   HENT:   Head: Normocephalic and atraumatic.   Mouth/Throat: Oropharynx is clear and moist.   Eyes: Conjunctivae and EOM are normal.   Neck: Normal range of motion. Neck supple. No JVD present.   Cardiovascular: Normal rate, regular rhythm, normal heart sounds and intact distal pulses. Exam reveals no gallop and no friction rub.   No murmur heard.  Pulmonary/Chest: Effort normal and breath sounds normal. No stridor. No respiratory distress. He has no wheezes. He has no rales. He exhibits no tenderness.   Abdominal: Soft. Bowel sounds are normal. He exhibits no distension and no mass. There is no tenderness. There is no rebound and no guarding.   Genitourinary:   Genitourinary Comments: Deferred   Musculoskeletal: Normal range of motion. He exhibits no edema or tenderness.   Soft cast in place to LLE. 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 No cranial nerve deficit.   Skin: Skin is warm and dry. No rash noted. He is not diaphoretic. No erythema.   LLE dressing CDI.    Psychiatric: He has a normal mood and affect. His speech is normal and behavior is normal. Judgment and thought content normal. Cognition and memory are normal.   Nursing note  and vitals reviewed.      Significant Labs:   Blood Culture: No results for input(s): LABBLOO in the last 48 hours.  CBC:   Recent Labs   Lab 11/09/18  2007 11/10/18  0444 11/11/18  0500   WBC 5.56 4.81 5.27   HGB 10.1* 10.1* 10.6*   HCT 32.7* 32.8* 34.0*    210 221     CMP:   Recent Labs   Lab 11/09/18  2007 11/10/18  0444 11/11/18  0500   * 136 138   K 5.7* 4.7 4.6    106 107   CO2 23 22* 22*   * 128* 79   BUN 25* 22 21   CREATININE 1.9* 1.6* 1.5*   CALCIUM 9.3 9.2 9.7   ALBUMIN 2.4*  --   --    ANIONGAP 6* 8 9   EGFRNONAA 36* 45* 48*       Significant Imaging:         Assessment/Plan:      * S/P transmetatarsal amputation of foot, left    - with delayed wound healing secondary to non-compliance with podiatry recommendations (patient reports getting foot wet and bearing weight)  - Defer care to podiatry  -wound cultures growing Enterobacter and Enterococcus  -scheduled for wound closure on Monday 11/5/18  -Infectious Disease following  -will need LTAC vs SNF  -Will continue zyvox and zosyn   11/4/18  -Surgery planned for tomorrow  -ID following  -IV zosyn discontinued   -Continue Zyvox po and IV Rocephin added   11/5/18  -Awaiting surgery for wound closure   11/6/18  -S/p Left foot Secondary Wound Closure and debridement of Bone/Transmetatarsal Amputation by Dr. Wheeler.   -Patient spike fever of 101.4F, IS encouraged   - CXR pending   -Social work consult for discharge planning (SNF vs LTAC). Patient will need IV abx for 6 weeks   11/7/18  -CXR showed no acute findings   -WBCs remain negative, no fever in 24 hours   -hypotension today, but is afebrile, and WBC is not high  - Likely not septic, will monitor closely.  11/8/18  -Continue current plan   11/9/18  -Awaiting placement   11/10/18- Osteo suspected per pathology report per Podiatry. -PICC to be placed with discharge to [facility] anticipated  11/11/18- current plan continued with PICC line placement in am      Osteomyelitis     -confirmed per pathology report  -Podiatry aware   -ID following   -IV antibiotics   -PICC line to be placed for long term therapy-planned for am   -discharge to [facility] anticipated         Immunosuppression    Hx of kidney transplant   Continue prednisone and Prograf per Nephrology  Prograf level 7.0 on 11/7/18 11/9/18-Prograf level pending  11/10/18- Prograf level 5.6  11/11/18-stable        Hx of right BKA    - Site is intact, well healed   -No s/s of infection          Non compliance with medical treatment    - Educated on the importance of adhering to medical treatment plan  - Patient has a limited support system  - Social work consult for DC planning (SNF vs LTAC)  -DC to [facility] anticipated on Monday 11/12/18       Abscess of left foot    See plan for s/p transmetatarsal amputation of foot  Wound cultures grew VRE and enterobacter  ID following   Zyvox po and IV Levaquin   Will follow anaerobic cultures  Bactrim added for stenotrophomonas by Dr. Veliz   Continue current treatment   Continue wound care        Peripheral vascular disease    - Resume home ASA   - Vascular f/u as needed       Chronic bilateral low back pain with left-sided sciatica    - Resume home Norco PRN  - Monitor  -Pain controlled  -Stable      Kidney transplant recipient    - patient is noted to be a poor historian and does not know what medications he takes at home  - per d/w Edmundo Hernandez today, he is currently prescribed Prednisone 5 mg daily, Cellcept 500 mg BID, and Prograf 1.5 mg BID  - Nephrology consulted  - Continue home Prednisone and Prograf for now  - Hold Cellcept per Dr. Staley  - Monitor Prograf levels   - Followed by Dr. Blanco in [place]   -Continue current medical management plan   -Nephrology following closely    -Creatinine increased to 2.6 most likely due to hypotension   -Gentle IV hydration    -Creatinine improved       Acute renal failure superimposed on stage 3 chronic kidney disease    -Creatinine  1.8 today. Slight increase. Closely monitor  - Daily BMP  - Monitor  -Creatinine 1.5 at baseline   -Creatinine stable  -Nephrology following   -Remains stable   -No change  11/7/18  -Creatinine 2.6, most likely 2/2 hypotension   -Bp medications adjusted   -IVFs   11/8/18  -Improving Creatinine currently 2.2  -Continue gentle IV hydration   11/9/18  -Creatinine 1.8  11/10/18- trending down at 1.6- current plan of care continued   11/11/18- trending down at 1.5-      Chronic obstructive pulmonary disease    - Currently appears compensated  - Continue home meds  - Supplemental O2 PRN  - Monitor  -Stable      Coronary artery disease involving native coronary artery of native heart without angina pectoris    -Continue ASA and Lopressor  -Cardiac monitoring  -Stable        Type 2 diabetes mellitus with hyperglycemia, with long-term current use of insulin    - A1c 8.0 in September  -Glucose slightly uncontrolled  - Accu checks ACHS with SSI PRN  - add NPH at lower doses  - Monitor  -SSI dose increased to moderate   -Continue to monitor adjust insulin accordingly        Essential hypertension    - BP well controlled  - Continue home Norvasc and Metoprolol  - Monitor  -Blood pressure needing better control, bidil added   -Lisinopril added   -BP stabilized   11/7/18  -Was previously elevated   -Bidil and Lisinopril stopped and amlodipine dose decreased to 2.5 daily   -Bp still marginally low, IVFs   -Monitor   11/8/18  -Blood pressure improved   -Will continue to hold ACEI   11/9/18  -Monitor   11/10/18- controlled on prescribed regime  11/11/18- current plan of care continued        VTE Risk Mitigation (From admission, onward)        Ordered     IP VTE HIGH RISK PATIENT  Once      11/05/18 1555     Place sequential compression device  Until discontinued      11/05/18 1555              Mimi Mazariegos NP  Department of Hospital Medicine   [facility]

## 2018-11-11 NOTE — PROGRESS NOTES
Patient accu check 71mg/dL. Patient asymptomatic. Offered orange juice. Informed Dr. Dominguez. Held scheduled insulin NPH per order. Will inform day nurse. Will continue to monitor.

## 2018-11-11 NOTE — ASSESSMENT & PLAN NOTE
- Educated on the importance of adhering to medical treatment plan  - Patient has a limited support system  - Social work consult for DC planning (SNF vs LTAC)  -DC to [facility] anticipated on Monday 11/12/18

## 2018-11-11 NOTE — ASSESSMENT & PLAN NOTE
-Creatinine 1.8 today. Slight increase. Closely monitor  - Daily BMP  - Monitor  -Creatinine 1.5 at baseline   -Creatinine stable  -Nephrology following   -Remains stable   -No change  11/7/18  -Creatinine 2.6, most likely 2/2 hypotension   -Bp medications adjusted   -IVFs   11/8/18  -Improving Creatinine currently 2.2  -Continue gentle IV hydration   11/9/18  -Creatinine 1.8  11/10/18- trending down at 1.6- current plan of care continued   11/11/18- trending down at 1.5-

## 2018-11-11 NOTE — PLAN OF CARE
Problem: Patient Care Overview  Goal: Plan of Care Review  Outcome: Ongoing (interventions implemented as appropriate)  POC reviewed, including indications and possible side effects of administered medications. Patient verbalized understanding and teach back. No adverse reactions noted. Patient c/o L foot pain. administered medications per order. Dressing remains RORO. Monitoring CBG's. NSR on heart monitor. Fall and contact precautions maintained. Patient remains free of falls and injuries during shift. Will continue to monitor.     Chart check complete.

## 2018-11-11 NOTE — ASSESSMENT & PLAN NOTE
- patient is noted to be a poor historian and does not know what medications he takes at home  - per d/w Edmundo Hernandez today, he is currently prescribed Prednisone 5 mg daily, Cellcept 500 mg BID, and Prograf 1.5 mg BID  - Nephrology consulted  - Continue home Prednisone and Prograf for now  - Hold Cellcept per Dr. Staley  - Monitor Prograf levels   - Followed by Dr. Blanco in [place]   -Continue current medical management plan   -Nephrology following closely    -Creatinine increased to 2.6 most likely due to hypotension   -Gentle IV hydration    -Creatinine improved

## 2018-11-11 NOTE — ASSESSMENT & PLAN NOTE
11/10- pathology report showed -  FINAL PATHOLOGIC DIAGNOSIS  Metatarsal bone, 1-5:  Acute osteomyelitis.  Reactive bone and cartilage.    Will plan to complete 6 weeks of therapy .  Out patient therapy will be IV Daptomycin 6mg/kg every 48 hours and Iv levquin 750 mg every 48 hours to complete 6 weeks of therapy   Day 11/42.

## 2018-11-11 NOTE — ASSESSMENT & PLAN NOTE
10/31- podiatry follow up , continue close follow up     11/1- will use operative cultures to guide therapy -prelim cultures -GNR, will continue zosyn and stop vanco ,follow ID of GNR     11/2- will follow pathology report and final cultures -prelim cultures- enterobacter /enterococcus-will add zyvox for possible VRE to zosyn.  Will adjust therapy with final drug susceptibility   11/6- will continue close podiatry follow up, will need therapy for osteomyelitis -  For stenotrophomonas-will use Levaquin -susceptibility confirmed with microlab  VRE-will use Zyvoc  Enterobacter -will use rocephin for now   Follow fever curve     11/10- podiatry follow up .

## 2018-11-11 NOTE — ASSESSMENT & PLAN NOTE
- with delayed wound healing secondary to non-compliance with podiatry recommendations (patient reports getting foot wet and bearing weight)  - Defer care to podiatry  -wound cultures growing Enterobacter and Enterococcus  -scheduled for wound closure on Monday 11/5/18  -Infectious Disease following  -will need LTAC vs SNF  -Will continue zyvox and zosyn   11/4/18  -Surgery planned for tomorrow  -ID following  -IV zosyn discontinued   -Continue Zyvox po and IV Rocephin added   11/5/18  -Awaiting surgery for wound closure   11/6/18  -S/p Left foot Secondary Wound Closure and debridement of Bone/Transmetatarsal Amputation by Dr. Wheeler.   -Patient spike fever of 101.4F, IS encouraged   - CXR pending   -Social work consult for discharge planning (SNF vs LTAC). Patient will need IV abx for 6 weeks   11/7/18  -CXR showed no acute findings   -WBCs remain negative, no fever in 24 hours   -hypotension today, but is afebrile, and WBC is not high  - Likely not septic, will monitor closely.  11/8/18  -Continue current plan   11/9/18  -Awaiting placement   11/10/18- Osteo suspected per pathology report per Podiatry. -PICC to be placed with discharge to [facility] anticipated  11/11/18- current plan continued with PICC line placement in am

## 2018-11-11 NOTE — ASSESSMENT & PLAN NOTE
See plan for s/p transmetatarsal amputation of foot  Wound cultures grew VRE and enterobacter  ID following   Zyvox po and IV Levaquin   Will follow anaerobic cultures  Bactrim added for stenotrophomonas by Dr. Veliz   Continue current treatment   Continue wound care

## 2018-11-11 NOTE — SUBJECTIVE & OBJECTIVE
Interval History: 63 year old man with diabetic foot.  All previous cultures reviewed   10/31- GNR  09/21- MSSA  He does not want LTAC   11/2- latest wound cultures -enterococcus/enterobacter   He is afebrile   11/3-wound cultures -VRE/ enterobacter   He says he does not feel well     11/4- more wound cultures= stenotrophomonas  VRE and enterobacter   He is afebrile   11/10  Pathology report -  FINAL PATHOLOGIC DIAGNOSIS  Metatarsal bone, 1-5:  Acute osteomyelitis.  Reactive bone and cartilage.  Wound cultures -VRE, stenotrophomonas and enterobacter   Review of Systems   Constitutional: Negative for chills, diaphoresis, fatigue and fever.   HENT: Negative for hearing loss, mouth sores, sore throat, tinnitus and trouble swallowing.    Eyes: Negative for pain, discharge and redness.   Respiratory: Negative for apnea, cough, choking, chest tightness, shortness of breath, wheezing and stridor.    Cardiovascular: Negative for chest pain, palpitations and leg swelling.   Gastrointestinal: Negative for abdominal distention, abdominal pain, blood in stool, constipation, diarrhea, nausea, rectal pain and vomiting.   Endocrine: Negative for cold intolerance, heat intolerance, polydipsia, polyphagia and polyuria.   Genitourinary: Negative for difficulty urinating, dysuria, flank pain, frequency, hematuria and urgency.   Musculoskeletal: Negative for arthralgias, back pain, gait problem, joint swelling, neck pain and neck stiffness.   Skin: Positive for color change and wound. Negative for rash.        Left foot with worsening erythema.    Allergic/Immunologic: Negative for food allergies.   Neurological: Negative for dizziness, tremors, seizures, syncope, speech difficulty, light-headedness and headaches.   Hematological: Negative for adenopathy. Does not bruise/bleed easily.   Psychiatric/Behavioral: Negative for agitation and confusion. The patient is not nervous/anxious.    All other systems reviewed and are  negative.    Objective:     Vital Signs (Most Recent):  Temp: 97.9 °F (36.6 °C) (11/10/18 1648)  Pulse: 71 (11/10/18 1648)  Resp: 18 (11/10/18 1102)  BP: (!) 180/81 (11/10/18 1648)  SpO2: (!) 93 % (11/10/18 1648) Vital Signs (24h Range):  Temp:  [97.9 °F (36.6 °C)-98.4 °F (36.9 °C)] 97.9 °F (36.6 °C)  Pulse:  [67-82] 71  Resp:  [16-18] 18  SpO2:  [93 %-97 %] 93 %  BP: (120-180)/(58-84) 180/81     Weight: 99.8 kg (220 lb 0.3 oz)  Body mass index is 30.69 kg/m².    Estimated Creatinine Clearance: 55.4 mL/min (A) (based on SCr of 1.6 mg/dL (H)).    Physical Exam   Constitutional: He is oriented to person, place, and time. He appears well-developed and well-nourished. No distress.   HENT:   Head: Normocephalic and atraumatic.   Mouth/Throat: Oropharynx is clear and moist.   Eyes: Conjunctivae and EOM are normal. Pupils are equal, round, and reactive to light.   Neck: Normal range of motion. Neck supple. No JVD present.   Cardiovascular: Normal rate, regular rhythm, normal heart sounds and intact distal pulses. Exam reveals no gallop and no friction rub.   No murmur heard.  Pulmonary/Chest: Effort normal and breath sounds normal. No stridor. No respiratory distress. He has no wheezes. He has no rales. He exhibits no tenderness.   Abdominal: Soft. Bowel sounds are normal. He exhibits no distension and no mass. There is no tenderness. There is no rebound and no guarding.   Musculoskeletal: Normal range of motion. He exhibits no edema or tenderness.   Dressing over  Left foot noted,has right LE prosthesis    Neurological: He is alert and oriented to person, place, and time. No cranial nerve deficit.   Skin: Skin is warm and dry. No rash noted. He is not diaphoretic. There is erythema.   -   Psychiatric: He has a normal mood and affect. His speech is normal. Judgment and thought content normal. He is withdrawn. Cognition and memory are normal. He is inattentive.   Nursing note and vitals reviewed.      Significant Labs:    Blood Culture:   Recent Labs   Lab 09/21/18  0907 09/21/18  1516 10/31/18  1054 10/31/18  1055 11/07/18  1014   LABBLOO No growth after 5 days. No growth after 5 days. No growth after 5 days. No growth after 5 days. No Growth to date  No Growth to date  No Growth to date  No Growth to date  No Growth to date  No Growth to date  No Growth to date  No Growth to date     BMP:   Recent Labs   Lab 11/09/18  2007 11/10/18  0444   * 128*   * 136   K 5.7* 4.7    106   CO2 23 22*   BUN 25* 22   CREATININE 1.9* 1.6*   CALCIUM 9.3 9.2   MG 1.7  --      CBC:   Recent Labs   Lab 11/09/18 0444 11/09/18  2007 11/10/18  0444   WBC 6.27 5.56 4.81   HGB 10.9* 10.1* 10.1*   HCT 34.8* 32.7* 32.8*    208 210     CMP:   Recent Labs   Lab 11/09/18 0444 11/09/18  2007 11/10/18  0444    135* 136   K 4.7 5.7* 4.7    106 106   CO2 23 23 22*   * 166* 128*   BUN 21 25* 22   CREATININE 1.8* 1.9* 1.6*   CALCIUM 9.7 9.3 9.2   ALBUMIN  --  2.4*  --    ANIONGAP 9 6* 8   EGFRNONAA 39* 36* 45*     Wound Culture:   Recent Labs   Lab 07/09/18  0856 09/21/18  1235 10/18/18  1328 10/31/18  0911 10/31/18  1640   LABAERO CITROBACTER FREUNDII  Rare    KLEBSIELLA OXYTOCA  Rare   STAPHYLOCOCCUS AUREUS  Few  Skin skylar also present   Skin skylar,  no predominant organism ENTEROBACTER CLOACAE  Many    STENOTROPHOMONAS (X.) MALTOPHILIA  Moderate   ENTEROCOCCUS FAECIUM VRE  Rare    ENTEROBACTER CLOACAE  Rare         Significant Imaging: I have reviewed all pertinent imaging results/findings within the past 24 hours.

## 2018-11-11 NOTE — ASSESSMENT & PLAN NOTE
Hx of kidney transplant   Continue prednisone and Prograf per Nephrology  Prograf level 7.0 on 11/7/18 11/9/18-Prograf level pending  11/10/18- Prograf level 5.6  11/11/18-stable

## 2018-11-11 NOTE — PROGRESS NOTES
[facility]  Infectious Disease  Progress Note    Patient Name: Lavelle Ladd  MRN: [id number]  Admission Date: 10/31/2018  Length of Stay: 10 days  Attending Physician: Shelton Edgar MD  Primary Care Provider: Rishabh Esteban MD    Isolation Status: Contact  Assessment/Plan:      * S/P transmetatarsal amputation of foot, left    10/31- podiatry follow up , continue close follow up     11/1- will use operative cultures to guide therapy -prelim cultures -GNR, will continue zosyn and stop vanco ,follow ID of GNR     11/2- will follow pathology report and final cultures -prelim cultures- enterobacter /enterococcus-will add zyvox for possible VRE to zosyn.  Will adjust therapy with final drug susceptibility   11/6- will continue close podiatry follow up, will need therapy for osteomyelitis -  For stenotrophomonas-will use Levaquin -susceptibility confirmed with microlab  VRE-will use Zyvoc  Enterobacter -will use rocephin for now   Follow fever curve     11/10- podiatry follow up .     Osteomyelitis    11/10- pathology report showed -  FINAL PATHOLOGIC DIAGNOSIS  Metatarsal bone, 1-5:  Acute osteomyelitis.  Reactive bone and cartilage.    Will plan to complete 6 weeks of therapy .  Out patient therapy will be IV Daptomycin 6mg/kg every 48 hours and Iv levquin 750 mg every 48 hours to complete 6 weeks of therapy   Day 11/42.        Peripheral vascular disease    11/1-will follow vascular surgery on discharge      11/2- will continue vascular surgery follow up     11/4- will continue close follow up by vascular surgery      Type 2 diabetes mellitus with hyperglycemia, with long-term current use of insulin    10/31- will continue close glucose control.  Insulin therapy     11/1- will continue insulin sliding scale , continue close follow up    11/2- insulin sliding scale as per primary team   11/3-  Will continue insulin sliding scale ,diabetic diet     11/4- diabetic diet , insulin sliding scale      11/6- will continue insulin scale     11/10- will continue insulin sliding scale, diabetic diet           Anticipated Disposition:     Thank you for your consult. I will follow-up with patient. Please contact us if you have any additional questions.    Ronny Veliz MD  Infectious Disease  [facility]    Subjective:     Principal Problem:S/P transmetatarsal amputation of foot, left    HPI:      63 year old  male  with a PMHx of COPD, CAD, Diastolic CHF, CKD, Renal transplant, GERD, Hepatitis C, HLD, HTN, PVD, and IDDM who presented to the hospital as a direct admit for planned surgery today per Dr. Wheeler.   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  Patient underwent delayed primary wound closure, on 9/24/18.  He presented to podiatry today for his 2 week follow-up of wound closure 2/2 to wound dehiscence.    All previous cultures reviewed -  09/21-    Wound cx on 9/21 showed MSSA  07/2018-  Newer results are available. Click to view them now.   Component 3mo ago   Aerobic Bacterial Culture CITROBACTER FREUNDII   Rare       Aerobic Bacterial Culture KLEBSIELLA OXYTOCA   Rare           He had on 10/31-   1. Left foot Irrigation and debridement to bone  2. Revisional Transmetatarsal Amputation, Left foot        Interval History: 63 year old man with diabetic foot.  All previous cultures reviewed   10/31- GNR  09/21- MSSA  He does not want LTAC   11/2- latest wound cultures -enterococcus/enterobacter   He is afebrile   11/3-wound cultures -VRE/ enterobacter   He says he does not feel well     11/4- more wound cultures= stenotrophomonas  VRE and enterobacter   He is afebrile   11/10  Pathology report -  FINAL PATHOLOGIC DIAGNOSIS  Metatarsal bone, 1-5:  Acute osteomyelitis.  Reactive bone and cartilage.  Wound cultures -VRE, stenotrophomonas and enterobacter   Review of Systems   Constitutional: Negative for chills, diaphoresis, fatigue and fever.    HENT: Negative for hearing loss, mouth sores, sore throat, tinnitus and trouble swallowing.    Eyes: Negative for pain, discharge and redness.   Respiratory: Negative for apnea, cough, choking, chest tightness, shortness of breath, wheezing and stridor.    Cardiovascular: Negative for chest pain, palpitations and leg swelling.   Gastrointestinal: Negative for abdominal distention, abdominal pain, blood in stool, constipation, diarrhea, nausea, rectal pain and vomiting.   Endocrine: Negative for cold intolerance, heat intolerance, polydipsia, polyphagia and polyuria.   Genitourinary: Negative for difficulty urinating, dysuria, flank pain, frequency, hematuria and urgency.   Musculoskeletal: Negative for arthralgias, back pain, gait problem, joint swelling, neck pain and neck stiffness.   Skin: Positive for color change and wound. Negative for rash.        Left foot with worsening erythema.    Allergic/Immunologic: Negative for food allergies.   Neurological: Negative for dizziness, tremors, seizures, syncope, speech difficulty, light-headedness and headaches.   Hematological: Negative for adenopathy. Does not bruise/bleed easily.   Psychiatric/Behavioral: Negative for agitation and confusion. The patient is not nervous/anxious.    All other systems reviewed and are negative.    Objective:     Vital Signs (Most Recent):  Temp: 97.9 °F (36.6 °C) (11/10/18 1648)  Pulse: 71 (11/10/18 1648)  Resp: 18 (11/10/18 1102)  BP: (!) 180/81 (11/10/18 1648)  SpO2: (!) 93 % (11/10/18 1648) Vital Signs (24h Range):  Temp:  [97.9 °F (36.6 °C)-98.4 °F (36.9 °C)] 97.9 °F (36.6 °C)  Pulse:  [67-82] 71  Resp:  [16-18] 18  SpO2:  [93 %-97 %] 93 %  BP: (120-180)/(58-84) 180/81     Weight: 99.8 kg (220 lb 0.3 oz)  Body mass index is 30.69 kg/m².    Estimated Creatinine Clearance: 55.4 mL/min (A) (based on SCr of 1.6 mg/dL (H)).    Physical Exam   Constitutional: He is oriented to person, place, and time. He appears well-developed and  well-nourished. No distress.   HENT:   Head: Normocephalic and atraumatic.   Mouth/Throat: Oropharynx is clear and moist.   Eyes: Conjunctivae and EOM are normal. Pupils are equal, round, and reactive to light.   Neck: Normal range of motion. Neck supple. No JVD present.   Cardiovascular: Normal rate, regular rhythm, normal heart sounds and intact distal pulses. Exam reveals no gallop and no friction rub.   No murmur heard.  Pulmonary/Chest: Effort normal and breath sounds normal. No stridor. No respiratory distress. He has no wheezes. He has no rales. He exhibits no tenderness.   Abdominal: Soft. Bowel sounds are normal. He exhibits no distension and no mass. There is no tenderness. There is no rebound and no guarding.   Musculoskeletal: Normal range of motion. He exhibits no edema or tenderness.   Dressing over  Left foot noted,has right LE prosthesis    Neurological: He is alert and oriented to person, place, and time. No cranial nerve deficit.   Skin: Skin is warm and dry. No rash noted. He is not diaphoretic. There is erythema.   -   Psychiatric: He has a normal mood and affect. His speech is normal. Judgment and thought content normal. He is withdrawn. Cognition and memory are normal. He is inattentive.   Nursing note and vitals reviewed.      Significant Labs:   Blood Culture:   Recent Labs   Lab 09/21/18  0907 09/21/18  1516 10/31/18  1054 10/31/18  1055 11/07/18  1014   LABBLOO No growth after 5 days. No growth after 5 days. No growth after 5 days. No growth after 5 days. No Growth to date  No Growth to date  No Growth to date  No Growth to date  No Growth to date  No Growth to date  No Growth to date  No Growth to date     BMP:   Recent Labs   Lab 11/09/18  2007 11/10/18  0444   * 128*   * 136   K 5.7* 4.7    106   CO2 23 22*   BUN 25* 22   CREATININE 1.9* 1.6*   CALCIUM 9.3 9.2   MG 1.7  --      CBC:   Recent Labs   Lab 11/09/18  0444 11/09/18  2007 11/10/18  0444   WBC 6.27 5.56  4.81   HGB 10.9* 10.1* 10.1*   HCT 34.8* 32.7* 32.8*    208 210     CMP:   Recent Labs   Lab 11/09/18  0444 11/09/18  2007 11/10/18  0444    135* 136   K 4.7 5.7* 4.7    106 106   CO2 23 23 22*   * 166* 128*   BUN 21 25* 22   CREATININE 1.8* 1.9* 1.6*   CALCIUM 9.7 9.3 9.2   ALBUMIN  --  2.4*  --    ANIONGAP 9 6* 8   EGFRNONAA 39* 36* 45*     Wound Culture:   Recent Labs   Lab 07/09/18  0856 09/21/18  1235 10/18/18  1328 10/31/18  0911 10/31/18  1640   LABAERO CITROBACTER FREUNDII  Rare    KLEBSIELLA OXYTOCA  Rare   STAPHYLOCOCCUS AUREUS  Few  Skin skylar also present   Skin skylar,  no predominant organism ENTEROBACTER CLOACAE  Many    STENOTROPHOMONAS (X.) MALTOPHILIA  Moderate   ENTEROCOCCUS FAECIUM VRE  Rare    ENTEROBACTER CLOACAE  Rare         Significant Imaging: I have reviewed all pertinent imaging results/findings within the past 24 hours.

## 2018-11-11 NOTE — ASSESSMENT & PLAN NOTE
10/31- will continue close glucose control.  Insulin therapy     11/1- will continue insulin sliding scale , continue close follow up    11/2- insulin sliding scale as per primary team   11/3-  Will continue insulin sliding scale ,diabetic diet     11/4- diabetic diet , insulin sliding scale     11/6- will continue insulin scale     11/10- will continue insulin sliding scale, diabetic diet

## 2018-11-12 LAB
ANION GAP SERPL CALC-SCNC: 10 MMOL/L
BACTERIA BLD CULT: NORMAL
BACTERIA BLD CULT: NORMAL
BASOPHILS # BLD AUTO: 0.01 K/UL
BASOPHILS NFR BLD: 0.2 %
BUN SERPL-MCNC: 22 MG/DL
CALCIUM SERPL-MCNC: 9.7 MG/DL
CHLORIDE SERPL-SCNC: 106 MMOL/L
CO2 SERPL-SCNC: 20 MMOL/L
CREAT SERPL-MCNC: 1.7 MG/DL
DIFFERENTIAL METHOD: ABNORMAL
EOSINOPHIL # BLD AUTO: 0.1 K/UL
EOSINOPHIL NFR BLD: 1.5 %
ERYTHROCYTE [DISTWIDTH] IN BLOOD BY AUTOMATED COUNT: 16 %
EST. GFR  (AFRICAN AMERICAN): 48 ML/MIN/1.73 M^2
EST. GFR  (NON AFRICAN AMERICAN): 41 ML/MIN/1.73 M^2
GLUCOSE SERPL-MCNC: 117 MG/DL
HCT VFR BLD AUTO: 32.9 %
HGB BLD-MCNC: 10.4 G/DL
LYMPHOCYTES # BLD AUTO: 1.8 K/UL
LYMPHOCYTES NFR BLD: 36.3 %
MCH RBC QN AUTO: 28.7 PG
MCHC RBC AUTO-ENTMCNC: 31.6 G/DL
MCV RBC AUTO: 91 FL
MONOCYTES # BLD AUTO: 0.4 K/UL
MONOCYTES NFR BLD: 8.3 %
NEUTROPHILS # BLD AUTO: 2.6 K/UL
NEUTROPHILS NFR BLD: 53.7 %
PLATELET # BLD AUTO: 200 K/UL
PMV BLD AUTO: 9.3 FL
POCT GLUCOSE: 113 MG/DL (ref 70–110)
POCT GLUCOSE: 232 MG/DL (ref 70–110)
POCT GLUCOSE: 248 MG/DL (ref 70–110)
POCT GLUCOSE: 259 MG/DL (ref 70–110)
POTASSIUM SERPL-SCNC: 4.6 MMOL/L
RBC # BLD AUTO: 3.63 M/UL
SODIUM SERPL-SCNC: 136 MMOL/L
WBC # BLD AUTO: 4.82 K/UL

## 2018-11-12 PROCEDURE — 94760 N-INVAS EAR/PLS OXIMETRY 1: CPT | Mod: HCNC

## 2018-11-12 PROCEDURE — 80048 BASIC METABOLIC PNL TOTAL CA: CPT | Mod: HCNC

## 2018-11-12 PROCEDURE — 25000242 PHARM REV CODE 250 ALT 637 W/ HCPCS: Mod: HCNC | Performed by: PODIATRIST

## 2018-11-12 PROCEDURE — 63600175 PHARM REV CODE 636 W HCPCS: Mod: HCNC | Performed by: NURSE PRACTITIONER

## 2018-11-12 PROCEDURE — 94640 AIRWAY INHALATION TREATMENT: CPT | Mod: HCNC

## 2018-11-12 PROCEDURE — 25000003 PHARM REV CODE 250: Mod: HCNC | Performed by: NURSE PRACTITIONER

## 2018-11-12 PROCEDURE — 36415 COLL VENOUS BLD VENIPUNCTURE: CPT | Mod: HCNC

## 2018-11-12 PROCEDURE — 25000003 PHARM REV CODE 250: Mod: HCNC | Performed by: PODIATRIST

## 2018-11-12 PROCEDURE — 21400001 HC TELEMETRY ROOM: Mod: HCNC

## 2018-11-12 PROCEDURE — 63600175 PHARM REV CODE 636 W HCPCS: Mod: HCNC | Performed by: HOSPITALIST

## 2018-11-12 PROCEDURE — 63600175 PHARM REV CODE 636 W HCPCS: Mod: HCNC | Performed by: INTERNAL MEDICINE

## 2018-11-12 PROCEDURE — 85025 COMPLETE CBC W/AUTO DIFF WBC: CPT | Mod: HCNC

## 2018-11-12 PROCEDURE — 96372 THER/PROPH/DIAG INJ SC/IM: CPT | Mod: HCNC

## 2018-11-12 RX ADMIN — HYDROCODONE BITARTRATE AND ACETAMINOPHEN 1 TABLET: 5; 325 TABLET ORAL at 01:11

## 2018-11-12 RX ADMIN — PREDNISONE 5 MG: 5 TABLET ORAL at 09:11

## 2018-11-12 RX ADMIN — ASPIRIN 81 MG: 81 TABLET, COATED ORAL at 09:11

## 2018-11-12 RX ADMIN — LINEZOLID 600 MG: 600 TABLET, FILM COATED ORAL at 09:11

## 2018-11-12 RX ADMIN — HYDROCODONE BITARTRATE AND ACETAMINOPHEN 1 TABLET: 5; 325 TABLET ORAL at 02:11

## 2018-11-12 RX ADMIN — ARFORMOTEROL TARTRATE 15 MCG: 15 SOLUTION RESPIRATORY (INHALATION) at 07:11

## 2018-11-12 RX ADMIN — METOPROLOL TARTRATE 25 MG: 25 TABLET ORAL at 09:11

## 2018-11-12 RX ADMIN — INSULIN ASPART 4 UNITS: 100 INJECTION, SOLUTION INTRAVENOUS; SUBCUTANEOUS at 11:11

## 2018-11-12 RX ADMIN — HYDRALAZINE HYDROCHLORIDE 10 MG: 20 INJECTION INTRAMUSCULAR; INTRAVENOUS at 05:11

## 2018-11-12 RX ADMIN — SODIUM BICARBONATE 650 MG TABLET 2600 MG: at 09:11

## 2018-11-12 RX ADMIN — TACROLIMUS 1.5 MG: 0.5 CAPSULE ORAL at 09:11

## 2018-11-12 RX ADMIN — AMLODIPINE BESYLATE 10 MG: 10 TABLET ORAL at 09:11

## 2018-11-12 RX ADMIN — GABAPENTIN 300 MG: 300 CAPSULE ORAL at 09:11

## 2018-11-12 RX ADMIN — HYDROCODONE BITARTRATE AND ACETAMINOPHEN 1 TABLET: 5; 325 TABLET ORAL at 06:11

## 2018-11-12 RX ADMIN — HUMAN INSULIN 20 UNITS: 100 INJECTION, SUSPENSION SUBCUTANEOUS at 05:11

## 2018-11-12 RX ADMIN — LEVOFLOXACIN 750 MG: 750 INJECTION, SOLUTION INTRAVENOUS at 09:11

## 2018-11-12 RX ADMIN — HYDROCODONE BITARTRATE AND ACETAMINOPHEN 1 TABLET: 5; 325 TABLET ORAL at 09:11

## 2018-11-12 RX ADMIN — INSULIN ASPART 2 UNITS: 100 INJECTION, SOLUTION INTRAVENOUS; SUBCUTANEOUS at 09:11

## 2018-11-12 RX ADMIN — BUDESONIDE 0.5 MG: 0.5 SUSPENSION RESPIRATORY (INHALATION) at 07:11

## 2018-11-12 RX ADMIN — GABAPENTIN 300 MG: 300 CAPSULE ORAL at 03:11

## 2018-11-12 RX ADMIN — TACROLIMUS 1.5 MG: 0.5 CAPSULE ORAL at 05:11

## 2018-11-12 RX ADMIN — HYDROCODONE BITARTRATE AND ACETAMINOPHEN 1 TABLET: 5; 325 TABLET ORAL at 10:11

## 2018-11-12 RX ADMIN — HYDRALAZINE HYDROCHLORIDE 10 MG: 20 INJECTION INTRAMUSCULAR; INTRAVENOUS at 04:11

## 2018-11-12 RX ADMIN — INSULIN ASPART 6 UNITS: 100 INJECTION, SOLUTION INTRAVENOUS; SUBCUTANEOUS at 05:11

## 2018-11-12 NOTE — ASSESSMENT & PLAN NOTE
- with delayed wound healing secondary to non-compliance with podiatry recommendations (patient reports getting foot wet and bearing weight)  - Defer care to podiatry  -wound cultures growing Enterobacter and Enterococcus  -scheduled for wound closure on Monday 11/5/18  -Infectious Disease following  -will need LTAC vs SNF  -Will continue zyvox and zosyn   11/4/18  -Surgery planned for tomorrow  -ID following  -IV zosyn discontinued   -Continue Zyvox po and IV Rocephin added   11/5/18  -Awaiting surgery for wound closure   11/6/18  -S/p Left foot Secondary Wound Closure and debridement of Bone/Transmetatarsal Amputation by Dr. Wheeler.   -Patient spike fever of 101.4F, IS encouraged   - CXR pending   -Social work consult for discharge planning (SNF vs LTAC). Patient will need IV abx for 6 weeks   11/7/18  -CXR showed no acute findings   -WBCs remain negative, no fever in 24 hours   -hypotension today, but is afebrile, and WBC is not high  - Likely not septic, will monitor closely.  11/8/18  -Continue current plan   11/9/18  -Awaiting placement   11/10/18- Osteo suspected per pathology report per Podiatry. -PICC to be placed with discharge to [facility] anticipated  11/11/18- current plan continued with PICC line placement in am   11/12/18- PICC line placed- current antibiotic regime continued

## 2018-11-12 NOTE — SUBJECTIVE & OBJECTIVE
Interval History: pt stable overnight.  PICC line placement planned for today.  Current plan of care continued.  Pt has been accepted to [facility] with transfer anticipated pending insurance authorization.  H/H and vital signs stable.      Review of Systems   Constitutional: Negative for chills, diaphoresis, fatigue and fever.   HENT: Negative for hearing loss, mouth sores, sore throat, tinnitus and trouble swallowing.    Eyes: Negative for pain, discharge and redness.   Respiratory: Negative for apnea, cough, choking, chest tightness, shortness of breath, wheezing and stridor.    Cardiovascular: Negative for chest pain, palpitations and leg swelling.   Gastrointestinal: Negative for abdominal distention, abdominal pain, blood in stool, constipation, diarrhea, nausea, rectal pain and vomiting.   Endocrine: Negative for cold intolerance, heat intolerance, polydipsia, polyphagia and polyuria.   Genitourinary: Negative for difficulty urinating, dysuria, flank pain, frequency, hematuria and urgency.   Musculoskeletal: Negative for arthralgias, back pain, gait problem, joint swelling, neck pain and neck stiffness.   Skin: Positive for color change and wound. Negative for rash.   Allergic/Immunologic: Negative for food allergies.   Neurological: Negative for dizziness, tremors, seizures, syncope, speech difficulty, light-headedness and headaches.   Hematological: Negative for adenopathy. Does not bruise/bleed easily.   Psychiatric/Behavioral: Negative for agitation and confusion. The patient is not nervous/anxious.    All other systems reviewed and are negative.    Objective:     Vital Signs (Most Recent):  Temp: 97.9 °F (36.6 °C) (11/12/18 0859)  Pulse: 72 (11/12/18 1328)  Resp: 20 (11/12/18 0859)  BP: (!) 146/71 (11/12/18 0859)  SpO2: 97 % (11/12/18 0859) Vital Signs (24h Range):  Temp:  [97.9 °F (36.6 °C)-98.2 °F (36.8 °C)] 97.9 °F (36.6 °C)  Pulse:  [66-81] 72  Resp:  [16-20] 20  SpO2:  [94 %-99 %] 97 %  BP:  (140-214)/(71-85) 146/71     Weight: 99.8 kg (220 lb 0.3 oz)  Body mass index is 30.69 kg/m².    Intake/Output Summary (Last 24 hours) at 11/12/2018 1424  Last data filed at 11/12/2018 1400  Gross per 24 hour   Intake 2260 ml   Output 2550 ml   Net -290 ml      Physical Exam   Constitutional: He is oriented to person, place, and time. He appears well-developed and well-nourished. No distress.   HENT:   Head: Normocephalic and atraumatic.   Mouth/Throat: Oropharynx is clear and moist.   Eyes: Conjunctivae and EOM are normal.   Neck: Normal range of motion. Neck supple. No JVD present.   Cardiovascular: Normal rate, regular rhythm, normal heart sounds and intact distal pulses. Exam reveals no gallop and no friction rub.   No murmur heard.  Pulmonary/Chest: Effort normal and breath sounds normal. No stridor. No respiratory distress. He has no wheezes. He has no rales. He exhibits no tenderness.   Abdominal: Soft. Bowel sounds are normal. He exhibits no distension and no mass. There is no tenderness. There is no rebound and no guarding.   Genitourinary:   Genitourinary Comments: Deferred   Musculoskeletal: Normal range of motion. He exhibits no edema or tenderness.   Soft cast in place to LLE. 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 No cranial nerve deficit.   Skin: Skin is warm and dry. No rash noted. He is not diaphoretic. No erythema.   LLE dressing CDI.    Psychiatric: He has a normal mood and affect. His speech is normal and behavior is normal. Judgment and thought content normal. Cognition and memory are normal.   Nursing note and vitals reviewed.      Significant Labs:   CBC:   Recent Labs   Lab 11/11/18  0500 11/12/18  0457   WBC 5.27 4.82   HGB 10.6* 10.4*   HCT 34.0* 32.9*    200     CMP:   Recent Labs   Lab 11/11/18  0500 11/12/18  0457    136   K 4.6 4.6    106   CO2 22* 20*   GLU 79 117*   BUN 21 22   CREATININE 1.5* 1.7*    CALCIUM 9.7 9.7   ANIONGAP 9 10   EGFRNONAA 48* 41*       Significant Imaging:

## 2018-11-12 NOTE — ASSESSMENT & PLAN NOTE
- A1c 8.0 in September  -Glucose controlled  - Accu checks ACHS with SSI PRN  - add NPH at lower doses  - Monitor  -SSI dose increased to moderate   -Continue to monitor adjust insulin accordingly   -pt counseled on the importance of maintaining compliance with prescribed medication regime and diabetic dietary restrictions

## 2018-11-12 NOTE — PLAN OF CARE
11/12/18 0920   Medicare Message   Important Message from Medicare regarding Discharge Appeal Rights Given to patient/caregiver;Explained to patient/caregiver;Signed/date by patient/caregiver   Date IMM was signed 11/12/18   Time IMM was signed 0920

## 2018-11-12 NOTE — PLAN OF CARE
PAOLA received a call from Bill with [facility].  She is awaiting the waiver letter from [facility] that shows payable for out of network facility. Transfer will need to be tomorrow since it is later in the day and it would be difficult to get medications this late.  Bill will update PAOLA when letter is received.

## 2018-11-12 NOTE — ASSESSMENT & PLAN NOTE
- Educated on the importance of adhering to medical treatment plan  - Patient has a limited support system  - Social work consult for DC planning (SNF vs LTAC)  -DC to [facility] anticipated on Monday 11/12/18 pending authorization

## 2018-11-12 NOTE — ASSESSMENT & PLAN NOTE
-Creatinine 1.8 today. Slight increase. Closely monitor  - Daily BMP  - Monitor  -Creatinine 1.5 at baseline   -Creatinine stable  -Nephrology following   -Remains stable   -No change  11/7/18  -Creatinine 2.6, most likely 2/2 hypotension   -Bp medications adjusted   -IVFs   11/8/18  -Improving Creatinine currently 2.2  -Continue gentle IV hydration   11/9/18  -Creatinine 1.8  11/10/18- trending down at 1.6- current plan of care continued   11/11/18- trending down at 1.5-   11/12/18- stable

## 2018-11-12 NOTE — ASSESSMENT & PLAN NOTE
- patient is noted to be a poor historian and does not know what medications he takes at home  - per d/w Edmundo Hernandez today, he is currently prescribed Prednisone 5 mg daily, Cellcept 500 mg BID, and Prograf 1.5 mg BID  - Nephrology consulted  - Continue home Prednisone and Prograf for now  - Hold Cellcept per Dr. Staley  - Monitor Prograf levels   - Followed by Dr. Blanco in [place]   -Continue current medical management plan   -Nephrology following closely    -Creatinine increased to 2.6 most likely due to hypotension   -Gentle IV hydration    -Creatinine stable

## 2018-11-12 NOTE — OP NOTE
The RUE was sterilely prepped and draped.  Lidocaine was used as a local anesthetic.  Using u/s guidance a 5 Thai 39 cm picc was placed into the basilic vein into the SVC/right atrium junction.  Placement was verified using X Ray.  PICC was secured in place with attachment device and is ready to use.  Pt tolerated proc well without immediate complications.

## 2018-11-12 NOTE — ASSESSMENT & PLAN NOTE
Hx of kidney transplant   Continue prednisone and Prograf per Nephrology  Prograf level 7.0 on 11/7/18 11/9/18-Prograf level pending  11/10/18- Prograf level 5.6  11/11/18-stable   11/12/18- stable

## 2018-11-12 NOTE — PLAN OF CARE
Problem: Patient Care Overview  Goal: Plan of Care Review  Outcome: Ongoing (interventions implemented as appropriate)  Pt remained free of injury during shift, stable condition, pain adequately controlled with PRN medication, no acute distress, receiving IV fluids, receiving antibiotics, contact precautions maintained, dressing to LLE intact with a small amount of serosanguinous drainage present, receiving breathing treatments through respiratory therapy, blood glucose monitoring performed, on cardiac monitoring (SR, HR 60-80s), and will continue to monitor. 12hr chart review performed.

## 2018-11-12 NOTE — PROGRESS NOTES
[facility] Hospital Medicine  Progress Note    Patient Name: Lavelle Ladd  MRN: [id number]  Patient Class: IP- Inpatient   Admission Date: 10/31/2018  Length of Stay: 12 days  Attending Physician: Shelton Edgar MD  Primary Care Provider: Rishabh Esteban MD        Subjective:     Principal Problem:S/P transmetatarsal amputation of foot, left    HPI:  Lavelle Ladd is a 63 y.o. male  with a PMHx of COPD, CAD, Diastolic CHF, CKD, Renal transplant, GERD, Hepatitis C, HLD, HTN, PVD, and IDDM who presented to the hospital today as a direct admit for planned surgery today per Dr. Wheeler.  Left foot xray today showed amputation of the distal aspect of the left foot at the level of the metatarsal bones.  No lytic abnormalities to suggest osteomyelitis by plain radiograph.  No evidence of acute fracture or dislocation.  Bony mineralization is normal.  Diffuse soft tissue and dense vascular calcifications.  Large plantar calcaneal spur.  He is s/p left transmetatarsal amputation secondary to gangrene of multiple digits and JOSE R.  Patient underwent delayed primary wound closure, on 9/24/18.  He presented to podiatry today for his 2 week follow-up of wound closure 2/2 to wound dehiscence.  Patient missed his last postoperative visit as he states he was unable to get a ride to his appointment.  He also admits to ambulating on his amputation site.  He drove himself to clinic today as he was unable to get a ride.  He states that he has gotten his foot wet as he has taken showers covering extremity in a garbage bag.  He denies any fever, chills, foot pain, CP, SOB, N/V/D, and all other symptoms at this time.  Patient has a history of poor compliance and difficult living dispo as he lives alone.  In the past we did try to get patient admitted to skilled nursing facility, but he has refused such services.   Wound cx on 9/21 showed MSSA for which he has completed IV cefazolin for 14 days secondary to MSSA  gangrene.  He has been NPO since midnight.  He will be admitted to the Medicine unit under the care of Hospital Medicine.  ID consulted per podiatry recs to guide antibiotic therapy postoperatively.  He is a [employer].  His SDM is his sister Kecia Williamson who can be reached at [phone].                Hospital Course:  11/1/18 The patient is S/P I&D of left foot yesterday. No acute issues overnight. The patient reports that his pain is well controlled. Continue IV ABX, cultures pending. Infectious disease is following. 11/2/18 wound culture growing Enterococcus and Enterobacter. Podiatry recommends SNF vs LTAC placement due to potential for limb loss. 11/3/18- Will continue zyvox and zosyn. Creatinine level at baseline. Surgery planned for Monday by Dr. Wheeler for secondary wound closure. 11/4/18- Patient awake and alert today. Dr. Guthrie at bedside performing wound care to left transmetatarsal amputation stump. Patient tolerated well. Plan for surgery tomorrow. Continue zyvox, IV zosyn discontinued and IV Rocephin added. 11/5/18-Creatinine stable. Blood pressure elevate this morning, lisinopril added per nephrology. Awaiting surgery for wound closure. 11/6/18-  S/p Left foot Secondary Wound Closure and debridement of Bone/Transmetatarsal Amputation by Dr. Wheeler.  Patine spike fever this afternoon. WBCs remain negative. CXR pending. Encourage IS use. Infectious disease on board. Social work consult for discharge planning (SNF vs LTAC). Patient will need IV abx x 6 weeks. 11/7/18- Early morning patient became hypotensive. Bidil and Lisinopril discontinued, amlodipine decreased to 2.5 mg daily. Blood pressure still marginally low but better. Creatinine increased to 2.6, most likely due to hypotension. Continue IVFs. 11/8/18- Creatinine trending down, will continue gentle IVFs. CT chest negative for acute findings. 11/9/18-No change in status, will continue current management plan.  11/10/18- Osteomyelitis  diagnosed per pathology report-per Podiatry with long term IV antibiotics anticipated.  ID following.  CM to assist with placement with discharge to [facility] anticipated.  11/11/18-Dressing to LLE changed per Podiatry.  Creatinine stable.  H/H stable.  Vital signs stable.  Zyvox and Levaquin continued.  11/12/18- PICC line placed.  Pt counseled on the importance of maintaining compliance with prescribed medication regime and diabetic dietary restrictions with understanding verbalized.  Pt accepted to [facility] pending insurance authorization.                 Interval History: pt stable overnight.  PICC line placement planned for today.  Current plan of care continued.  Pt has been accepted to [facility] with transfer anticipated pending insurance authorization.  H/H and vital signs stable.      Review of Systems   Constitutional: Negative for chills, diaphoresis, fatigue and fever.   HENT: Negative for hearing loss, mouth sores, sore throat, tinnitus and trouble swallowing.    Eyes: Negative for pain, discharge and redness.   Respiratory: Negative for apnea, cough, choking, chest tightness, shortness of breath, wheezing and stridor.    Cardiovascular: Negative for chest pain, palpitations and leg swelling.   Gastrointestinal: Negative for abdominal distention, abdominal pain, blood in stool, constipation, diarrhea, nausea, rectal pain and vomiting.   Endocrine: Negative for cold intolerance, heat intolerance, polydipsia, polyphagia and polyuria.   Genitourinary: Negative for difficulty urinating, dysuria, flank pain, frequency, hematuria and urgency.   Musculoskeletal: Negative for arthralgias, back pain, gait problem, joint swelling, neck pain and neck stiffness.   Skin: Positive for color change and wound. Negative for rash.   Allergic/Immunologic: Negative for food allergies.   Neurological: Negative for dizziness, tremors, seizures, syncope, speech difficulty, light-headedness and headaches.    Hematological: Negative for adenopathy. Does not bruise/bleed easily.   Psychiatric/Behavioral: Negative for agitation and confusion. The patient is not nervous/anxious.    All other systems reviewed and are negative.    Objective:     Vital Signs (Most Recent):  Temp: 97.9 °F (36.6 °C) (11/12/18 0859)  Pulse: 72 (11/12/18 1328)  Resp: 20 (11/12/18 0859)  BP: (!) 146/71 (11/12/18 0859)  SpO2: 97 % (11/12/18 0859) Vital Signs (24h Range):  Temp:  [97.9 °F (36.6 °C)-98.2 °F (36.8 °C)] 97.9 °F (36.6 °C)  Pulse:  [66-81] 72  Resp:  [16-20] 20  SpO2:  [94 %-99 %] 97 %  BP: (140-214)/(71-85) 146/71     Weight: 99.8 kg (220 lb 0.3 oz)  Body mass index is 30.69 kg/m².    Intake/Output Summary (Last 24 hours) at 11/12/2018 1424  Last data filed at 11/12/2018 1400  Gross per 24 hour   Intake 2260 ml   Output 2550 ml   Net -290 ml      Physical Exam   Constitutional: He is oriented to person, place, and time. He appears well-developed and well-nourished. No distress.   HENT:   Head: Normocephalic and atraumatic.   Mouth/Throat: Oropharynx is clear and moist.   Eyes: Conjunctivae and EOM are normal.   Neck: Normal range of motion. Neck supple. No JVD present.   Cardiovascular: Normal rate, regular rhythm, normal heart sounds and intact distal pulses. Exam reveals no gallop and no friction rub.   No murmur heard.  Pulmonary/Chest: Effort normal and breath sounds normal. No stridor. No respiratory distress. He has no wheezes. He has no rales. He exhibits no tenderness.   Abdominal: Soft. Bowel sounds are normal. He exhibits no distension and no mass. There is no tenderness. There is no rebound and no guarding.   Genitourinary:   Genitourinary Comments: Deferred   Musculoskeletal: Normal range of motion. He exhibits no edema or tenderness.   Soft cast in place to LLE. 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 No cranial nerve deficit.   Skin: Skin is warm and dry.  No rash noted. He is not diaphoretic. No erythema.   LLE dressing CDI.    Psychiatric: He has a normal mood and affect. His speech is normal and behavior is normal. Judgment and thought content normal. Cognition and memory are normal.   Nursing note and vitals reviewed.      Significant Labs:   CBC:   Recent Labs   Lab 11/11/18  0500 11/12/18  0457   WBC 5.27 4.82   HGB 10.6* 10.4*   HCT 34.0* 32.9*    200     CMP:   Recent Labs   Lab 11/11/18  0500 11/12/18  0457    136   K 4.6 4.6    106   CO2 22* 20*   GLU 79 117*   BUN 21 22   CREATININE 1.5* 1.7*   CALCIUM 9.7 9.7   ANIONGAP 9 10   EGFRNONAA 48* 41*       Significant Imaging:         Assessment/Plan:      * S/P transmetatarsal amputation of foot, left    - with delayed wound healing secondary to non-compliance with podiatry recommendations (patient reports getting foot wet and bearing weight)  - Defer care to podiatry  -wound cultures growing Enterobacter and Enterococcus  -scheduled for wound closure on Monday 11/5/18  -Infectious Disease following  -will need LTAC vs SNF  -Will continue zyvox and zosyn   11/4/18  -Surgery planned for tomorrow  -ID following  -IV zosyn discontinued   -Continue Zyvox po and IV Rocephin added   11/5/18  -Awaiting surgery for wound closure   11/6/18  -S/p Left foot Secondary Wound Closure and debridement of Bone/Transmetatarsal Amputation by Dr. Wheeler.   -Patient spike fever of 101.4F, IS encouraged   - CXR pending   -Social work consult for discharge planning (SNF vs LTAC). Patient will need IV abx for 6 weeks   11/7/18  -CXR showed no acute findings   -WBCs remain negative, no fever in 24 hours   -hypotension today, but is afebrile, and WBC is not high  - Likely not septic, will monitor closely.  11/8/18  -Continue current plan   11/9/18  -Awaiting placement   11/10/18- Osteo suspected per pathology report per Podiatry. -PICC to be placed with discharge to [facility] anticipated  11/11/18- current plan  continued with PICC line placement in am   11/12/18- PICC line placed- current antibiotic regime continued     Osteomyelitis    -confirmed per pathology report  -Podiatry aware   -ID following   -IV antibiotics   -PICC line placed for long term therapy  -discharge to [facility] anticipated pending authorization       Immunosuppression    Hx of kidney transplant   Continue prednisone and Prograf per Nephrology  Prograf level 7.0 on 11/7/18 11/9/18-Prograf level pending  11/10/18- Prograf level 5.6  11/11/18-stable   11/12/18- stable        Hx of right BKA    - Site is intact, well healed   -No s/s of infection          Non compliance with medical treatment    - Educated on the importance of adhering to medical treatment plan  - Patient has a limited support system  - Social work consult for DC planning (SNF vs LTAC)  -DC to [facility] anticipated on Monday 11/12/18 pending authorization        Abscess of left foot    See plan for s/p transmetatarsal amputation of foot  Wound cultures grew VRE and enterobacter  ID following   Zyvox po and IV Levaquin   Will follow anaerobic cultures  Bactrim added for stenotrophomonas by Dr. Veliz   Continue current treatment   Continue wound care        Peripheral vascular disease    - Resume home ASA   - Vascular f/u as needed       Chronic bilateral low back pain with left-sided sciatica    - Resume home Norco PRN  - Monitor  -Pain controlled  -Stable      Kidney transplant recipient    - patient is noted to be a poor historian and does not know what medications he takes at home  - per d/w Edmundo Hernandez today, he is currently prescribed Prednisone 5 mg daily, Cellcept 500 mg BID, and Prograf 1.5 mg BID  - Nephrology consulted  - Continue home Prednisone and Prograf for now  - Hold Cellcept per Dr. Staley  - Monitor Prograf levels   - Followed by Dr. Blanco in [place]   -Continue current medical management plan   -Nephrology following closely    -Creatinine increased to 2.6  most likely due to hypotension   -Gentle IV hydration    -Creatinine stable       Acute renal failure superimposed on stage 3 chronic kidney disease    -Creatinine 1.8 today. Slight increase. Closely monitor  - Daily BMP  - Monitor  -Creatinine 1.5 at baseline   -Creatinine stable  -Nephrology following   -Remains stable   -No change  11/7/18  -Creatinine 2.6, most likely 2/2 hypotension   -Bp medications adjusted   -IVFs   11/8/18  -Improving Creatinine currently 2.2  -Continue gentle IV hydration   11/9/18  -Creatinine 1.8  11/10/18- trending down at 1.6- current plan of care continued   11/11/18- trending down at 1.5-   11/12/18- stable     Chronic obstructive pulmonary disease    - Currently appears compensated  - Continue home meds  - Supplemental O2 PRN  - Monitor  -Stable      Coronary artery disease involving native coronary artery of native heart without angina pectoris    -Continue ASA and Lopressor  -Cardiac monitoring  -Stable        Type 2 diabetes mellitus with hyperglycemia, with long-term current use of insulin    - A1c 8.0 in September  -Glucose controlled  - Accu checks ACHS with SSI PRN  - add NPH at lower doses  - Monitor  -SSI dose increased to moderate   -Continue to monitor adjust insulin accordingly   -pt counseled on the importance of maintaining compliance with prescribed medication regime and diabetic dietary restrictions       Essential hypertension    - BP well controlled  - Continue home Norvasc and Metoprolol  - Monitor  -Blood pressure needing better control, bidil added   -Lisinopril added   -BP stabilized   11/7/18  -Was previously elevated   -Bidil and Lisinopril stopped and amlodipine dose decreased to 2.5 daily   -Bp still marginally low, IVFs   -Monitor   11/8/18  -Blood pressure improved   -Will continue to hold ACEI   11/9/18  -Monitor   11/10/18- controlled on prescribed regime  11/11/18- current plan of care continued   11/12/18- current plan of care continued       VTE Risk  Mitigation (From admission, onward)        Ordered     IP VTE HIGH RISK PATIENT  Once      11/05/18 1555     Place sequential compression device  Until discontinued      11/05/18 1555              Mimi Mazariegos NP  Department of Hospital Medicine   [facility]

## 2018-11-12 NOTE — ASSESSMENT & PLAN NOTE
-confirmed per pathology report  -Podiatry aware   -ID following   -IV antibiotics   -PICC line placed for long term therapy  -discharge to [facility] anticipated pending authorization

## 2018-11-12 NOTE — ASSESSMENT & PLAN NOTE
- BP well controlled  - Continue home Norvasc and Metoprolol  - Monitor  -Blood pressure needing better control, bidil added   -Lisinopril added   -BP stabilized   11/7/18  -Was previously elevated   -Bidil and Lisinopril stopped and amlodipine dose decreased to 2.5 daily   -Bp still marginally low, IVFs   -Monitor   11/8/18  -Blood pressure improved   -Will continue to hold ACEI   11/9/18  -Monitor   11/10/18- controlled on prescribed regime  11/11/18- current plan of care continued   11/12/18- current plan of care continued

## 2018-11-12 NOTE — PLAN OF CARE
Problem: Patient Care Overview  Goal: Plan of Care Review  Outcome: Ongoing (interventions implemented as appropriate)  No acute distress noted. Blood glucose being monitored. IV infusing. Safety measures are in place. Call bell within reach. Pain being managed. Pt free of falls. Will continue to monitor. Chart check complete.

## 2018-11-12 NOTE — PLAN OF CARE
PAOLA Reeves supervisor spoke to Mirtha with [employer].  She has routed the case to the medical director for approval of out of network facility since all in network facilities have declined the referral.  Awaiting determination on approval.

## 2018-11-13 ENCOUNTER — TELEPHONE (OUTPATIENT)
Dept: PODIATRY | Facility: CLINIC | Age: 63
End: 2018-11-13

## 2018-11-13 VITALS
DIASTOLIC BLOOD PRESSURE: 74 MMHG | TEMPERATURE: 98 F | BODY MASS INDEX: 30.8 KG/M2 | WEIGHT: 220 LBS | OXYGEN SATURATION: 98 % | RESPIRATION RATE: 20 BRPM | HEIGHT: 71 IN | HEART RATE: 77 BPM | SYSTOLIC BLOOD PRESSURE: 178 MMHG

## 2018-11-13 LAB
ANION GAP SERPL CALC-SCNC: 7 MMOL/L
BASOPHILS # BLD AUTO: 0.01 K/UL
BASOPHILS NFR BLD: 0.2 %
BUN SERPL-MCNC: 24 MG/DL
CALCIUM SERPL-MCNC: 9.4 MG/DL
CHLORIDE SERPL-SCNC: 106 MMOL/L
CO2 SERPL-SCNC: 25 MMOL/L
CREAT SERPL-MCNC: 1.5 MG/DL
DIFFERENTIAL METHOD: ABNORMAL
EOSINOPHIL # BLD AUTO: 0.1 K/UL
EOSINOPHIL NFR BLD: 1.7 %
ERYTHROCYTE [DISTWIDTH] IN BLOOD BY AUTOMATED COUNT: 16 %
EST. GFR  (AFRICAN AMERICAN): 56 ML/MIN/1.73 M^2
EST. GFR  (NON AFRICAN AMERICAN): 48 ML/MIN/1.73 M^2
GLUCOSE SERPL-MCNC: 134 MG/DL
HCT VFR BLD AUTO: 34.2 %
HGB BLD-MCNC: 10.6 G/DL
LYMPHOCYTES # BLD AUTO: 1.7 K/UL
LYMPHOCYTES NFR BLD: 31.4 %
MCH RBC QN AUTO: 28.3 PG
MCHC RBC AUTO-ENTMCNC: 31 G/DL
MCV RBC AUTO: 91 FL
MONOCYTES # BLD AUTO: 0.5 K/UL
MONOCYTES NFR BLD: 8.5 %
NEUTROPHILS # BLD AUTO: 3.1 K/UL
NEUTROPHILS NFR BLD: 58.2 %
PLATELET # BLD AUTO: 183 K/UL
PMV BLD AUTO: 9.4 FL
POCT GLUCOSE: 109 MG/DL (ref 70–110)
POCT GLUCOSE: 206 MG/DL (ref 70–110)
POCT GLUCOSE: 93 MG/DL (ref 70–110)
POTASSIUM SERPL-SCNC: 4.5 MMOL/L
RBC # BLD AUTO: 3.75 M/UL
SODIUM SERPL-SCNC: 138 MMOL/L
WBC # BLD AUTO: 5.32 K/UL

## 2018-11-13 PROCEDURE — 25000003 PHARM REV CODE 250: Mod: HCNC | Performed by: NURSE PRACTITIONER

## 2018-11-13 PROCEDURE — 94761 N-INVAS EAR/PLS OXIMETRY MLT: CPT | Mod: HCNC

## 2018-11-13 PROCEDURE — 25000003 PHARM REV CODE 250: Mod: HCNC | Performed by: PODIATRIST

## 2018-11-13 PROCEDURE — 63600175 PHARM REV CODE 636 W HCPCS: Mod: HCNC | Performed by: INTERNAL MEDICINE

## 2018-11-13 PROCEDURE — 25000242 PHARM REV CODE 250 ALT 637 W/ HCPCS: Mod: HCNC | Performed by: PODIATRIST

## 2018-11-13 PROCEDURE — 85025 COMPLETE CBC W/AUTO DIFF WBC: CPT | Mod: HCNC

## 2018-11-13 PROCEDURE — 63600175 PHARM REV CODE 636 W HCPCS: Mod: HCNC | Performed by: HOSPITALIST

## 2018-11-13 PROCEDURE — 94640 AIRWAY INHALATION TREATMENT: CPT | Mod: HCNC

## 2018-11-13 PROCEDURE — 80048 BASIC METABOLIC PNL TOTAL CA: CPT | Mod: HCNC

## 2018-11-13 PROCEDURE — 63600175 PHARM REV CODE 636 W HCPCS: Mod: HCNC | Performed by: NURSE PRACTITIONER

## 2018-11-13 RX ORDER — HYDROCODONE BITARTRATE AND ACETAMINOPHEN 10; 325 MG/1; MG/1
1 TABLET ORAL EVERY 4 HOURS PRN
Qty: 18 TABLET | Refills: 0 | Status: SHIPPED | OUTPATIENT
Start: 2018-11-13 | End: 2018-11-13 | Stop reason: SDUPTHER

## 2018-11-13 RX ORDER — LEVOFLOXACIN 5 MG/ML
750 INJECTION, SOLUTION INTRAVENOUS DAILY
Qty: 6300 ML | Refills: 0 | Status: SHIPPED | OUTPATIENT
Start: 2018-11-13 | End: 2018-11-13

## 2018-11-13 RX ORDER — LINEZOLID 600 MG/1
600 TABLET, FILM COATED ORAL EVERY 12 HOURS
Qty: 80 TABLET | Refills: 0 | Status: SHIPPED | OUTPATIENT
Start: 2018-11-13 | End: 2018-11-15 | Stop reason: SDUPTHER

## 2018-11-13 RX ORDER — GABAPENTIN 300 MG/1
300 CAPSULE ORAL 3 TIMES DAILY
Qty: 30 CAPSULE | Refills: 0 | Status: SHIPPED | OUTPATIENT
Start: 2018-11-13 | End: 2020-01-15

## 2018-11-13 RX ORDER — HYDROCODONE BITARTRATE AND ACETAMINOPHEN 10; 325 MG/1; MG/1
1 TABLET ORAL EVERY 4 HOURS PRN
Qty: 18 TABLET | Refills: 0 | Status: SHIPPED | OUTPATIENT
Start: 2018-11-13 | End: 2018-11-16

## 2018-11-13 RX ORDER — LEVOFLOXACIN 5 MG/ML
750 INJECTION, SOLUTION INTRAVENOUS DAILY
Qty: 6300 ML | Refills: 0 | Status: SHIPPED | OUTPATIENT
Start: 2018-11-13 | End: 2018-12-05 | Stop reason: ALTCHOICE

## 2018-11-13 RX ORDER — AMLODIPINE BESYLATE 10 MG/1
10 TABLET ORAL DAILY
Qty: 30 TABLET | Refills: 0 | Status: SHIPPED | OUTPATIENT
Start: 2018-11-13 | End: 2019-08-05 | Stop reason: SDUPTHER

## 2018-11-13 RX ORDER — BENZONATATE 100 MG/1
100 CAPSULE ORAL 3 TIMES DAILY PRN
Qty: 30 CAPSULE | Refills: 0 | Status: SHIPPED | OUTPATIENT
Start: 2018-11-13 | End: 2018-12-13

## 2018-11-13 RX ADMIN — PREDNISONE 5 MG: 5 TABLET ORAL at 09:11

## 2018-11-13 RX ADMIN — METOPROLOL TARTRATE 25 MG: 25 TABLET ORAL at 09:11

## 2018-11-13 RX ADMIN — HYDROCODONE BITARTRATE AND ACETAMINOPHEN 1 TABLET: 5; 325 TABLET ORAL at 02:11

## 2018-11-13 RX ADMIN — GABAPENTIN 300 MG: 300 CAPSULE ORAL at 09:11

## 2018-11-13 RX ADMIN — HYDROCODONE BITARTRATE AND ACETAMINOPHEN 1 TABLET: 5; 325 TABLET ORAL at 09:11

## 2018-11-13 RX ADMIN — LINEZOLID 600 MG: 600 TABLET, FILM COATED ORAL at 09:11

## 2018-11-13 RX ADMIN — HUMAN INSULIN 20 UNITS: 100 INJECTION, SUSPENSION SUBCUTANEOUS at 06:11

## 2018-11-13 RX ADMIN — HYDROCODONE BITARTRATE AND ACETAMINOPHEN 1 TABLET: 5; 325 TABLET ORAL at 04:11

## 2018-11-13 RX ADMIN — TACROLIMUS 1.5 MG: 0.5 CAPSULE ORAL at 05:11

## 2018-11-13 RX ADMIN — HUMAN INSULIN 20 UNITS: 100 INJECTION, SUSPENSION SUBCUTANEOUS at 05:11

## 2018-11-13 RX ADMIN — INSULIN ASPART 4 UNITS: 100 INJECTION, SOLUTION INTRAVENOUS; SUBCUTANEOUS at 05:11

## 2018-11-13 RX ADMIN — ASPIRIN 81 MG: 81 TABLET, COATED ORAL at 09:11

## 2018-11-13 RX ADMIN — BUDESONIDE 0.5 MG: 0.5 SUSPENSION RESPIRATORY (INHALATION) at 09:11

## 2018-11-13 RX ADMIN — TACROLIMUS 1.5 MG: 0.5 CAPSULE ORAL at 09:11

## 2018-11-13 RX ADMIN — SODIUM BICARBONATE 650 MG TABLET 2600 MG: at 09:11

## 2018-11-13 RX ADMIN — AMLODIPINE BESYLATE 10 MG: 10 TABLET ORAL at 09:11

## 2018-11-13 RX ADMIN — HYDRALAZINE HYDROCHLORIDE 10 MG: 20 INJECTION INTRAMUSCULAR; INTRAVENOUS at 01:11

## 2018-11-13 RX ADMIN — GABAPENTIN 300 MG: 300 CAPSULE ORAL at 02:11

## 2018-11-13 RX ADMIN — ARFORMOTEROL TARTRATE 15 MCG: 15 SOLUTION RESPIRATORY (INHALATION) at 09:11

## 2018-11-13 RX ADMIN — HYDROCODONE BITARTRATE AND ACETAMINOPHEN 1 TABLET: 5; 325 TABLET ORAL at 06:11

## 2018-11-13 RX ADMIN — LEVOFLOXACIN 750 MG: 750 INJECTION, SOLUTION INTRAVENOUS at 09:11

## 2018-11-13 NOTE — PLAN OF CARE
Form 142 received.  PAOLA spoke to Bill with [facility].  Can transfer today.  Number for report [phone] and transportation time in about 1 hour by [employer] (arranged by [facility]).  Packet provided to GURJIT Farooq

## 2018-11-13 NOTE — PLAN OF CARE
Problem: Patient Care Overview  Goal: Plan of Care Review  Outcome: Ongoing (interventions implemented as appropriate)  Patient remained free from injury. PRN pain meds managed pain. Cardiaci monitored- NSR; Blood glucose monitored. No s/s of acute distress. 12hr chart check complete.

## 2018-11-13 NOTE — DISCHARGE INSTRUCTIONS
Please monitor platelets weekly for 6 weeks while on Zyvox.  Forward results to Dr. Veliz (Infectious Disease) at [address] [place] [phone]

## 2018-11-13 NOTE — PROGRESS NOTES
RT came for breathing tx this morning. Pt said he may take it after breakfast. RT will return later.

## 2018-11-13 NOTE — TELEPHONE ENCOUNTER
Lamar Villa RN with [facility] was informed per Nevaeh that Mr. Ladd has been scheduled with Dr. Wheeler at the [facility] for 1:40pm on 11/28/2018. She informed me that this appointment will be on his paperwork and handed to the SNF.

## 2018-11-13 NOTE — PLAN OF CARE
Form 142 not received from [facility].  Patient will not transfer until form 142 received.  CM updated Delilah, NP and patient.

## 2018-11-13 NOTE — DISCHARGE SUMMARY
[facility] Hospital Medicine  Discharge Summary      Patient Name: Lavelle Ladd  MRN: [id number]  Admission Date: 10/31/2018  Hospital Length of Stay: 13 days  Discharge Date and Time: 11/13/2018  6:47 PM  Attending Physician: Shelton Edgar MD   Discharging Provider: Mimi Mazariegos NP  Primary Care Provider: Rishabh Esteban MD      HPI:   Lavelle Ladd is a 63 y.o. male  with a PMHx of COPD, CAD, Diastolic CHF, CKD, Renal transplant, GERD, Hepatitis C, HLD, HTN, PVD, and IDDM who presented to the hospital today as a direct admit for planned surgery today per Dr. Wheeler.  Left foot xray today showed amputation of the distal aspect of the left foot at the level of the metatarsal bones.  No lytic abnormalities to suggest osteomyelitis by plain radiograph.  No evidence of acute fracture or dislocation.  Bony mineralization is normal.  Diffuse soft tissue and dense vascular calcifications.  Large plantar calcaneal spur.  He is s/p left transmetatarsal amputation secondary to gangrene of multiple digits and JOSE R.  Patient underwent delayed primary wound closure, on 9/24/18.  He presented to podiatry today for his 2 week follow-up of wound closure 2/2 to wound dehiscence.  Patient missed his last postoperative visit as he states he was unable to get a ride to his appointment.  He also admits to ambulating on his amputation site.  He drove himself to clinic today as he was unable to get a ride.  He states that he has gotten his foot wet as he has taken showers covering extremity in a garbage bag.  He denies any fever, chills, foot pain, CP, SOB, N/V/D, and all other symptoms at this time.  Patient has a history of poor compliance and difficult living dispo as he lives alone.  In the past we did try to get patient admitted to skilled nursing facility, but he has refused such services.   Wound cx on 9/21 showed MSSA for which he has completed IV cefazolin for 14 days secondary to MSSA gangrene.  He  has been NPO since midnight.  He will be admitted to the Medicine unit under the care of [facility].  ID consulted per podiatry recs to guide antibiotic therapy postoperatively.  He is a Full Code.  His SDM is his sister Kecia Williamson who can be reached at [phone].              Procedure(s) (LRB):  CLOSURE, WOUND (Left)  DEBRIDEMENT, METATARSAL BONE, 2 OR MORE BONES (Left)  AMPUTATION, FOOT, TRANSMETATARSAL (Left)      Hospital Course:   11/1/18 The patient is S/P I&D of left foot yesterday. No acute issues overnight. The patient reports that his pain is well controlled. Continue IV ABX, cultures pending. Infectious disease is following. 11/2/18 wound culture growing Enterococcus and Enterobacter. Podiatry recommends SNF vs LTAC placement due to potential for limb loss. 11/3/18- Will continue zyvox and zosyn. Creatinine level at baseline. Surgery planned for Monday by Dr. Wheeler for secondary wound closure. 11/4/18- Patient awake and alert today. Dr. Guthrie at bedside performing wound care to left transmetatarsal amputation stump. Patient tolerated well. Plan for surgery tomorrow. Continue zyvox, IV zosyn discontinued and IV Rocephin added. 11/5/18-Creatinine stable. Blood pressure elevate this morning, lisinopril added per nephrology. Awaiting surgery for wound closure. 11/6/18-  S/p Left foot Secondary Wound Closure and debridement of Bone/Transmetatarsal Amputation by Dr. Wheeler.  Patine spike fever this afternoon. WBCs remain negative. CXR pending. Encourage IS use. Infectious disease on board. Social work consult for discharge planning (SNF vs LTAC). Patient will need IV abx x 6 weeks. 11/7/18- Early morning patient became hypotensive. Bidil and Lisinopril discontinued, amlodipine decreased to 2.5 mg daily. Blood pressure still marginally low but better. Creatinine increased to 2.6, most likely due to hypotension. Continue IVFs. 11/8/18- Creatinine trending down, will continue gentle IVFs. CT  chest negative for acute findings. 11/9/18-No change in status, will continue current management plan.  11/10/18- Osteomyelitis diagnosed per pathology report-per Podiatry with long term IV antibiotics anticipated.  ID following.  CM to assist with placement with discharge to [facility] anticipated.  11/11/18-Dressing to LLE changed per Podiatry.  Creatinine stable.  H/H stable.  Vital signs stable.  Zyvox and Levaquin continued.  11/12/18- PICC line placed.  Pt counseled on the importance of maintaining compliance with prescribed medication regime and diabetic dietary restrictions with understanding verbalized.  Pt accepted to [facility] pending insurance authorization.  11/13/18- pt stable and eligible for discharge to [facility].  Pt seen and examined on the date of discharge and deemed suitable for discharge to SNF.  Pt encouraged to maintain compliance with prescribed medication regime, diabetic dietary restrictions, and follow up appointments with understanding verbalized.  Current medications resumed with Tessalon, Levaquin, Zyvox, and Norvasc prescribed.  Pt instructed to follow up with PCP, ID, and Podiatry.                    Consults:   Consults (From admission, onward)        Status Ordering Provider     Inpatient consult to Infectious Diseases  Once     Provider:  Ronny Veliz MD    Acknowledged LORENA ROJAS     Inpatient consult to Nephrology  Once     Provider:  Carlos Staley MD    Completed LORENA ROJAS     Inpatient consult to Podiatry  Once     Provider:  Karla Corbett DPM    Completed LORENA ROJAS     Inpatient consult to Social Work  Once     Provider:  (Not yet assigned)    Completed KARLA CORBETT          No new Assessment & Plan notes have been filed under this hospital service since the last note was generated.  Service: Hospital Medicine    Final Active Diagnoses:    Diagnosis Date Noted POA    PRINCIPAL PROBLEM:  S/P transmetatarsal  amputation of foot, left [Z89.432] 10/31/2018 Not Applicable    Osteomyelitis [M86.9] 11/10/2018 Unknown    Immunosuppression [D89.9]  Yes    Abscess of left foot [L02.612] 10/31/2018 Unknown    Non compliance with medical treatment [Z91.19] 10/31/2018 Not Applicable    Peripheral vascular disease [I73.9] 07/06/2018 Yes    Chronic bilateral low back pain with left-sided sciatica [M54.42, G89.29] 01/25/2018 Yes    Kidney transplant recipient [Z94.0] 04/07/2017 Not Applicable    Acute renal failure superimposed on stage 3 chronic kidney disease [N17.9, N18.3] 09/25/2016 Yes    Chronic obstructive pulmonary disease [J44.9] 09/12/2016 Yes    Coronary artery disease involving native coronary artery of native heart without angina pectoris [I25.10] 07/01/2016 Yes    Type 2 diabetes mellitus with hyperglycemia, with long-term current use of insulin [E11.65, Z79.4]  Not Applicable    Essential hypertension [I10] 02/20/2015 Yes      Problems Resolved During this Admission:    Diagnosis Date Noted Date Resolved POA    Cellulitis of left foot [L03.116] 07/20/2018 11/06/2018 Yes       Discharged Condition: stable    Disposition: Long Term Care    Follow Up:  Follow-up Information     [facility].    Why:  SNF  Contact information:  [address] [address]           Rishabh Esteban MD In 3 days.    Specialty:  Family Medicine  Why:  -hospital follow up   Contact information:  [address]  [place] [address]  [phone]             Katerina Magaña DPM In 2 weeks.    Specialty:  Podiatry  Why:  -hospital follow up   Contact information:  [address] [place] [address]  [phone]             Ronny Veliz MD In 2 weeks.    Specialty:  Infectious Diseases  Why:  -hospital follow up   Contact information:  [address]  [place] [address]  [phone]                 Patient Instructions:      Diet Cardiac     Diet diabetic      Notify your health care provider if you experience any of the following:  temperature >100.4     Notify your health care provider if you experience any of the following:  persistent nausea and vomiting or diarrhea     Notify your health care provider if you experience any of the following:  redness, tenderness, or signs of infection (pain, swelling, redness, odor or green/yellow discharge around incision site)     Notify your health care provider if you experience any of the following:  difficulty breathing or increased cough     Notify your health care provider if you experience any of the following:  persistent dizziness, light-headedness, or visual disturbances     Notify your health care provider if you experience any of the following:  increased confusion or weakness     Activity as tolerated       Significant Diagnostic Studies: Labs:   CMP   Recent Labs   Lab 11/12/18 0457 11/13/18  0500    138   K 4.6 4.5    106   CO2 20* 25   * 134*   BUN 22 24*   CREATININE 1.7* 1.5*   CALCIUM 9.7 9.4   ANIONGAP 10 7*   ESTGFRAFRICA 48* 56*   EGFRNONAA 41* 48*    and CBC   Recent Labs   Lab 11/12/18 0457 11/13/18  0500   WBC 4.82 5.32   HGB 10.4* 10.6*   HCT 32.9* 34.2*    183     Microbiology:   Blood Culture   Lab Results   Component Value Date    LABBLOO No growth after 5 days. 11/07/2018    LABBLOO No growth after 5 days. 11/07/2018    and Wound Culture: positive for ENTEROCOCCUS FAECIUM VRE     Pending Diagnostic Studies:     None         Medications:  Reconciled Home Medications:      Medication List      START taking these medications    benzonatate 100 MG capsule  Commonly known as:  TESSALON  Take 1 capsule (100 mg total) by mouth 3 (three) times daily as needed for Cough.     HYDROcodone-acetaminophen  mg per tablet  Commonly known as:  NORCO  Take 1 tablet by mouth every 4 (four) hours as needed for Pain.     levoFLOXacin 750 mg/150 mL Pgbk  Commonly known as:   LEVAQUIN  Inject 150 mLs (750 mg total) into the vein once daily.     linezolid 600 mg Tab  Commonly known as:  ZYVOX  Take 1 tablet (600 mg total) by mouth every 12 (twelve) hours.        CHANGE how you take these medications    amLODIPine 10 MG tablet  Commonly known as:  NORVASC  Take 1 tablet (10 mg total) by mouth once daily.  What changed:    · medication strength  · how much to take        CONTINUE taking these medications    aspirin 81 MG EC tablet  Commonly known as:  ECOTRIN  Take 1 tablet (81 mg total) by mouth once daily.     BD INSULIN SYRINGE ULTRA-FINE 1 mL 31 gauge x 5/16 Syrg  Generic drug:  insulin syringe-needle U-100  USE ONE AS DIRECTED FOUR TIMES DAILY WITH MEALS AND AT BEDTIME     blood sugar diagnostic Strp  1 each by Misc.(Non-Drug; Combo Route) route 3 (three) times daily.     blood-glucose meter kit  Use as instructed     budesonide-formoterol 160-4.5 mcg 160-4.5 mcg/actuation Hfaa  Commonly known as:  SYMBICORT  Inhale 2 puffs into the lungs every 12 (twelve) hours. Wash out mouth after using     ergocalciferol 50,000 unit Cap  Commonly known as:  ERGOCALCIFEROL  Take 1 capsule (50,000 Units total) by mouth every 7 days. Take on Mondays     gabapentin 300 MG capsule  Commonly known as:  NEURONTIN  Take 1 capsule (300 mg total) by mouth 3 (three) times daily. for 10 days     inhalation spacing device  Commonly known as:  BREATHERITE VALVED MDI CHAMBER  Use as directed for inhalation.     insulin  unit/mL injection  Commonly known as:  NovoLIN N NPH U-100 Insulin  Take 25 units subq with breakfast and 15 units at bedtime     lancets Misc  1 each by Misc.(Non-Drug; Combo Route) route 3 (three) times daily.     metoprolol tartrate 25 MG tablet  Commonly known as:  LOPRESSOR  Take 1 tablet (25 mg total) by mouth 2 (two) times daily.     mycophenolate 500 mg Tab  Commonly known as:  CELLCEPT  Take 500 mg by mouth 2 (two) times daily.     nozaseptin nasal   Commonly known as:   "NOZIN  1 each by Each Nare route 2 (two) times daily.     ondansetron 4 MG Tbdl  Commonly known as:  ZOFRAN-ODT  Take 1 tablet (4 mg total) by mouth every 8 (eight) hours as needed.     * pen needle, diabetic 32 gauge x 5/32" Ndle  Commonly known as:  EASY COMFORT PEN NEEDLES  Inject 1 each into the skin 3 (three) times daily.     * BD ULTRA-FINE SHORT PEN NEEDLE 31 gauge x 5/16" Ndle  Generic drug:  pen needle, diabetic     predniSONE 5 MG tablet  Commonly known as:  DELTASONE  Take 1 tablet (5 mg total) by mouth once daily.     sodium bicarbonate 650 MG tablet  Take 4 tablets (2,600 mg total) by mouth 2 (two) times daily.     tacrolimus 0.5 MG Cap  Commonly known as:  PROGRAF  Take 3 capsules (1.5 mg total) by mouth every 12 (twelve) hours. Z94.0 Kidney Transplant         * This list has 2 medication(s) that are the same as other medications prescribed for you. Read the directions carefully, and ask your doctor or other care provider to review them with you.            STOP taking these medications    NovoLIN R Regular U-100 Insuln 100 unit/mL injection  Generic drug:  insulin regular            Indwelling Lines/Drains at time of discharge:   Lines/Drains/Airways     Peripherally Inserted Central Catheter Line                 PICC Double Lumen 11/12/18 1306 right basilic less than 1 day                Time spent on the discharge of patient: > 30 minutes  Patient was seen and examined on the date of discharge and determined to be suitable for discharge.         Mimi Mazariegos NP  Department of Hospital Medicine  [facility]  "

## 2018-11-13 NOTE — TELEPHONE ENCOUNTER
----- Message from Lamar Villa RN sent at 11/13/2018 10:21 AM CST -----  Patient needs 2 week post op appointment.  Patient will likely discharge today to SNF

## 2018-11-13 NOTE — PLAN OF CARE
CM left message for state to return call for locet    @[phone] [facility] called to Amy @[phone]    @9883 CM contacted [facility].  They are behind on processing Form 142 and will likely not process it today.    @1400 CM updated patient on discharge plan.  Patient understand that transfer will not be today if form 142 not received today.

## 2018-11-13 NOTE — PLAN OF CARE
CM spoke to VERO Mazariegos regarding antibiotics.  Per Delilah, Dr Veliz changed therapy to IV levaquin and po Zyvox.  CM sent message to Bill with [facility] to review since antibiotics have changed.    @7672 Bill with [facility] will price medications and contact CM when reviewed by pharmacy

## 2018-11-14 ENCOUNTER — TELEPHONE (OUTPATIENT)
Dept: PODIATRY | Facility: CLINIC | Age: 63
End: 2018-11-14

## 2018-11-14 NOTE — PLAN OF CARE
Nov28 Post OP with Karla Wheeler DPM   Wednesday Nov 28, 2018 1:40 PM  [facility] - Podiatry   [address] [place] [address]   [phone]         11/14/18 0759   Final Note   Assessment Type Final Discharge Note   Anticipated Discharge Disposition SNF   Hospital Follow Up  Appt(s) scheduled? Yes   Right Care Referral Info   Post Acute Recommendation SNF / Sub-Acute Rehab   Facility Name [facility]

## 2018-11-14 NOTE — PLAN OF CARE
Problem: Patient Care Overview  Goal: Plan of Care Review  Outcome: Ongoing (interventions implemented as appropriate)  Fall precautions maintained. Pt free from falls/injuries. Pt repositions and ambulates with assistance. Pt has occasional c/o pain. Pain moderately controlled with PRN medication. Diabetic diet maintained and tolerated. Glycemic control provided with ACHS glucose monitoring. Infection control with IV abx. Fluid promotion with PO fluids and IV fluids. Patient turns every 2 hours. POC and meds reviewed. Patient verbalized understanding. Side rails up x2. Bed Low and locked. Personal items and call light within reach. No signs/symptoms of acute distress. Chart check done. Will monitor

## 2018-11-14 NOTE — TELEPHONE ENCOUNTER
----- Message from Vivi Wolf sent at 11/14/2018  3:12 PM CST -----  Contact: pt  States he had surgery on his foot and he would like to speak to the nurse. Haim call pt at [phone]. Thank you

## 2018-11-14 NOTE — TELEPHONE ENCOUNTER
Mr. CAMILA Ladd was given a return call, and he was informed that Dr. Wheeler reached out to Dr. Veliz's office/nurse but they have not return her call as of yet. He was informed that Dr. Veliz will have to agree to writing the orders for [facility]. He was informed that Dr. Wheeler does not return to the clinic until 11/20/2018. He verbalized understanding, stating that he appreciated me for calling and he knows this will take a little time. The call ended well.

## 2018-11-14 NOTE — TELEPHONE ENCOUNTER
----- Message from Georgina Chapman sent at 11/14/2018  8:37 AM CST -----  pt needs c/b rg having home care....[phone] ([place])

## 2018-11-15 ENCOUNTER — DOCUMENTATION ONLY (OUTPATIENT)
Dept: HEPATOLOGY | Facility: HOSPITAL | Age: 63
End: 2018-11-15

## 2018-11-15 RX ORDER — LINEZOLID 600 MG/1
600 TABLET, FILM COATED ORAL 2 TIMES DAILY
Qty: 28 TABLET | Refills: 0 | Status: SHIPPED | OUTPATIENT
Start: 2018-11-15 | End: 2018-11-29

## 2018-11-15 RX ORDER — LINEZOLID 600 MG/1
600 TABLET, FILM COATED ORAL EVERY 12 HOURS
Qty: 80 TABLET | Refills: 0 | Status: SHIPPED | OUTPATIENT
Start: 2018-11-15 | End: 2018-12-26

## 2018-11-15 NOTE — PROGRESS NOTES
I was informed that the copayment for Daptomycin is 1800usd.    Will continue zyvox and levaquin at this time.  Will follow up in clinic

## 2018-11-15 NOTE — PROGRESS NOTES
I was informed by Dr Magaña that he signed out of SNF on November 13 .  He is now at home and wants home IV therapy .  Will contact [facility] to do IV daptomycin 6 mg /kg   For 6 weeks . He will need to continue Levaquin   He will need weekly ESR, CRP, CBC, CPK and CMP while on therapy .

## 2018-11-16 ENCOUNTER — TELEPHONE (OUTPATIENT)
Dept: PODIATRY | Facility: CLINIC | Age: 63
End: 2018-11-16

## 2018-11-16 NOTE — TELEPHONE ENCOUNTER
Spoke with pt on the 14th and was informed that he left the SNF and wanted to be set up with [facility]. Contacted Dr. Rosen to have that set up. Dr. Rosen informed Dr. Magaña on the 15th that he would set that up for the pt.

## 2018-11-19 ENCOUNTER — TELEPHONE (OUTPATIENT)
Dept: PODIATRY | Facility: CLINIC | Age: 63
End: 2018-11-19

## 2018-11-19 NOTE — TELEPHONE ENCOUNTER
----- Message from Jose Granda sent at 11/19/2018  8:55 AM CST -----  Contact: pt   Pt is requesting a call back from the nurse in regards to pt getting set up for wound care .  [phone] (home)

## 2018-11-19 NOTE — TELEPHONE ENCOUNTER
Mr. CAMILA Ladd was given a return call, and he was informed to give Dr. Wheeler and staff a return call on tomorrow 11/20/2018 when they return to clinic so that he can be better advised on this matter. Mr. Ladd verbalized understanding, and the call ended well.

## 2018-11-20 ENCOUNTER — OFFICE VISIT (OUTPATIENT)
Dept: PODIATRY | Facility: CLINIC | Age: 63
End: 2018-11-20
Payer: MEDICARE

## 2018-11-20 ENCOUNTER — TELEPHONE (OUTPATIENT)
Dept: PODIATRY | Facility: CLINIC | Age: 63
End: 2018-11-20

## 2018-11-20 VITALS
HEART RATE: 72 BPM | WEIGHT: 220 LBS | TEMPERATURE: 97 F | BODY MASS INDEX: 30.8 KG/M2 | SYSTOLIC BLOOD PRESSURE: 143 MMHG | HEIGHT: 71 IN | DIASTOLIC BLOOD PRESSURE: 48 MMHG

## 2018-11-20 DIAGNOSIS — Z89.432 S/P TRANSMETATARSAL AMPUTATION OF FOOT, LEFT: Primary | ICD-10-CM

## 2018-11-20 DIAGNOSIS — I73.9 PVD (PERIPHERAL VASCULAR DISEASE): ICD-10-CM

## 2018-11-20 DIAGNOSIS — M86.172 ACUTE OSTEOMYELITIS OF LEFT ANKLE OR FOOT: ICD-10-CM

## 2018-11-20 PROCEDURE — 99999 PR PBB SHADOW E&M-EST. PATIENT-LVL IV: CPT | Mod: PBBFAC,HCNC,, | Performed by: PODIATRIST

## 2018-11-20 PROCEDURE — 99024 POSTOP FOLLOW-UP VISIT: CPT | Mod: HCNC,S$GLB,, | Performed by: PODIATRIST

## 2018-11-20 RX ORDER — HYDROCODONE BITARTRATE 10 MG/1
CAPSULE, EXTENDED RELEASE ORAL
COMMUNITY
Start: 2018-11-16 | End: 2019-04-12

## 2018-11-20 NOTE — PROGRESS NOTES
"[facility]   PODIATRIC MEDICINE AND SURGERY  PROGRESS NOTE    CC:   Chief Complaint   Patient presents with    Post-op Evaluation     Wound check DOS: 11/05/18 Wound Closure, left foot            SUBJECTIVE   Lavelle Ladd is a 63 y.o. male status post left revisional transmetatarsal amputation and secondary wound closure final closure November 5, 2018. Pt is approximately 2 weeks post operatively.  Patient has history of poor compliance and relates that he left against medical advice from nursing facility as he was not pleased with standard of care.  He has been changing his dressing at home without any physician or nurse orders.  He presents my clinic today for follow-up evaluation.  Denies any nausea vomiting fever or pain to the surgical site.  He states he has tried to be compliant with nonweightbearing to left lower extremity.  He is being currently managed with p.o. Antibiotics- ZYVOX (6 weeks duration) by Dr. Veliz with Infectious Disease as he did have positive cultures for osteomyelitis on amputation resection sites. He denies any fever. States he has changed dressing on his own every 3 days. He has tried to be compliant with NWB with wheel chair.  Pt requests PICC line removal. States PICC line is bothering him and he is no longer having IV and requests that I remove the PICC line.    OBJECTIVE   Vitals:    11/20/18 1422   BP: (!) 143/48   Pulse: 72   Temp: 97.3 °F (36.3 °C)   TempSrc: Oral   Weight: 99.8 kg (220 lb 0.3 oz)   Height: 5' 11" (1.803 m)       Neurovascular status - vascular status grossly intact.   There is mild serousanguinous drainage central aspect of amputation site, sutures and staples are well coapted throughout amputation stump  There is mild necrosis on lateral aspect of amputation stump, CFT WNL   There is negative malodor noted   There is mild erythema plantar stump, there is no increase in warmth noted     MICRO:  Aerobic Bacterial Culture ENTEROCOCCUS FAECIUM " VRE   Rare       Aerobic Bacterial Culture ENTEROBACTER CLOACAE   Rare           Radiographs reviewed:   -  post op.    Pathology:  FINAL PATHOLOGIC DIAGNOSIS  Metatarsal bone, 1-5:  Acute osteomyelitis.  Reactive bone and cartilage.      ASSESSMENT  Encounter Diagnoses   Name Primary?    S/P transmetatarsal amputation of foot, left Yes    PVD (peripheral vascular disease)     Acute osteomyelitis of left ankle or foot          PLAN      S/P transmetatarsal amputation of foot, left    PVD (peripheral vascular disease)    Acute osteomyelitis of left ankle or foot      Dressing change performed. Reinforced compliance. Strict NWB. Do not change dressing  Continue with Zyvox twice daily  Follow up with Dr. Veliz (12/5/18)  Follow up with me in 1 week  PICC line removed without incident. Cleansed with alcohol at site and band aid applied. No blood loss noted.  The patient is alerted to watch for any signs of worsening of infection (redness, pus, pain, increased swelling or fever) and call if such occurs or report to Emergency room if after hours. At risk for loss of limb. Will reassess in 1 week.    Disclaimer:  This note may have been prepared using voice recognition software, it may have not been extensively proofed, as such there could be errors within the text such as sound alike errors.       Report Electronically Signed By:     Karla Wheeler DPM   Podiatric Medicine & Surgery  [facility]  11/20/2018

## 2018-11-20 NOTE — TELEPHONE ENCOUNTER
----- Message from Iveth Wen sent at 11/20/2018  9:11 AM CST -----  Contact: pt  He is calling in regards to getting Home Health for wound care, please advise [phone] (home)

## 2018-11-28 ENCOUNTER — OFFICE VISIT (OUTPATIENT)
Dept: PODIATRY | Facility: CLINIC | Age: 63
End: 2018-11-28
Payer: MEDICARE

## 2018-11-28 VITALS
SYSTOLIC BLOOD PRESSURE: 180 MMHG | TEMPERATURE: 98 F | BODY MASS INDEX: 30.8 KG/M2 | OXYGEN SATURATION: 97 % | DIASTOLIC BLOOD PRESSURE: 83 MMHG | WEIGHT: 220 LBS | HEART RATE: 71 BPM | HEIGHT: 71 IN

## 2018-11-28 DIAGNOSIS — Z89.432 S/P TRANSMETATARSAL AMPUTATION OF FOOT, LEFT: Primary | ICD-10-CM

## 2018-11-28 DIAGNOSIS — M86.172 ACUTE OSTEOMYELITIS OF LEFT ANKLE OR FOOT: ICD-10-CM

## 2018-11-28 DIAGNOSIS — I73.9 PVD (PERIPHERAL VASCULAR DISEASE): ICD-10-CM

## 2018-11-28 PROCEDURE — 99999 PR PBB SHADOW E&M-EST. PATIENT-LVL V: CPT | Mod: PBBFAC,HCNC,, | Performed by: PODIATRIST

## 2018-11-28 PROCEDURE — 99024 POSTOP FOLLOW-UP VISIT: CPT | Mod: HCNC,S$GLB,, | Performed by: PODIATRIST

## 2018-11-28 NOTE — PROGRESS NOTES
"[facility] -   PODIATRIC MEDICINE AND SURGERY  PROGRESS NOTE    CC:   Chief Complaint   Patient presents with    Post-op Evaluation       SUBJECTIVE   Lavelle Ladd is a 63 y.o. male status post left revisional transmetatarsal amputation and secondary wound closure final closure November 5, 2018. Pt is approximately 2 weeks post operatively.  Patient has history of poor compliance and relates that he left against medical advice from nursing facility as he was not pleased with standard of care.  He has been changing his dressing at home without any physician or nurse orders.  He presents my clinic today for follow-up evaluation.  Denies any nausea vomiting fever or pain to the surgical site.  He states he has tried to be compliant with nonweightbearing to left lower extremity.  He is being currently managed with p.o. Antibiotics- ZYVOX (6 weeks duration) by Dr. Veliz with Infectious Disease as he did have positive cultures for osteomyelitis on amputation resection sites. He denies any fever. States he has changed dressing on his own every 3 days. He has tried to be compliant with NWB with wheel chair.  Pt requests PICC line removal. States PICC line is bothering him and he is no longer having IV and requests that I remove the PICC line.    11/28/2018  Pt is here for 3 week follow up post operatively. He is currently taking oral Zyvox for osteomyelitis. No complaints. He has remained NWB with wheelchair at home.     OBJECTIVE   Vitals:    11/28/18 1344   BP: (!) 180/83   Pulse: 71   Temp: 97.8 °F (36.6 °C)   SpO2: 97%   Weight: 99.8 kg (220 lb 0.3 oz)   Height: 5' 11" (1.803 m)   PainSc: 0-No pain       Neurovascular status - vascular status grossly intact.   There is mild serousanguinous drainage central aspect of amputation site, sutures and staples are well coapted throughout amputation stump  There is negative malodor noted   There is no erythema, there is no increase in warmth noted "     MICRO:  Aerobic Bacterial Culture ENTEROCOCCUS FAECIUM VRE   Rare       Aerobic Bacterial Culture ENTEROBACTER CLOACAE   Rare           Radiographs reviewed:   -  post op.    Pathology:  FINAL PATHOLOGIC DIAGNOSIS  Metatarsal bone, 1-5:  Acute osteomyelitis.  Reactive bone and cartilage.      ASSESSMENT  Encounter Diagnoses   Name Primary?    S/P transmetatarsal amputation of foot, left Yes    PVD (peripheral vascular disease)     Acute osteomyelitis of left ankle or foot          PLAN      S/P transmetatarsal amputation of foot, left    PVD (peripheral vascular disease)    Acute osteomyelitis of left ankle or foot      Dressing change performed. Partial suture removal performed. Verbal consent obtained to proceed with suture reinforcement medial incision flap. 2, 3-0 nylon sutures applied to site. Pt was fitted in CAM boot. STrict NWB. Reinforced compliance. Strict NWB. Do not change dressing  Continue with Zyvox twice daily  Follow upw with Dr. Veliz 12/15/18    The patient is alerted to watch for any signs of worsening of infection (redness, pus, pain, increased swelling or fever) and call if such occurs or report to Emergency room if after hours. At risk for loss of limb. Will reassess in 1 week.    Disclaimer:  This note may have been prepared using voice recognition software, it may have not been extensively proofed, as such there could be errors within the text such as sound alike errors.       Future Appointments   Date Time Provider Department Center   12/5/2018  2:15 PM Ronny Veliz MD [facility] None   12/12/2018  4:00 PM Karla Wheeler DPM [facility] POD [place]   1/11/2019 10:30 AM Amira Lynn Jr., PA-C [facility] DIABETE [place]       Report Electronically Signed By:     Karla Wheeler DPM   Podiatric Medicine & Surgery  [facility]  11/28/2018

## 2018-12-05 ENCOUNTER — LAB VISIT (OUTPATIENT)
Dept: LAB | Facility: HOSPITAL | Age: 63
End: 2018-12-05
Attending: INTERNAL MEDICINE
Payer: MEDICARE

## 2018-12-05 DIAGNOSIS — M86.9 OSTEOMYELITIS, UNSPECIFIED SITE, UNSPECIFIED TYPE: ICD-10-CM

## 2018-12-05 LAB
ALBUMIN SERPL BCP-MCNC: 3.7 G/DL
ALP SERPL-CCNC: 86 U/L
ALT SERPL W/O P-5'-P-CCNC: 11 U/L
ANION GAP SERPL CALC-SCNC: 9 MMOL/L
AST SERPL-CCNC: 11 U/L
BILIRUB SERPL-MCNC: 0.3 MG/DL
BUN SERPL-MCNC: 25 MG/DL
CALCIUM SERPL-MCNC: 9.7 MG/DL
CHLORIDE SERPL-SCNC: 99 MMOL/L
CO2 SERPL-SCNC: 27 MMOL/L
CREAT SERPL-MCNC: 1.6 MG/DL
CRP SERPL-MCNC: 1 MG/L
ERYTHROCYTE [SEDIMENTATION RATE] IN BLOOD BY WESTERGREN METHOD: 10 MM/HR
EST. GFR  (AFRICAN AMERICAN): 51.5 ML/MIN/1.73 M^2
EST. GFR  (NON AFRICAN AMERICAN): 44.5 ML/MIN/1.73 M^2
GLUCOSE SERPL-MCNC: 153 MG/DL
POTASSIUM SERPL-SCNC: 4.9 MMOL/L
PROT SERPL-MCNC: 7.3 G/DL
SODIUM SERPL-SCNC: 135 MMOL/L

## 2018-12-05 PROCEDURE — 80053 COMPREHEN METABOLIC PANEL: CPT | Mod: HCNC

## 2018-12-05 PROCEDURE — 85651 RBC SED RATE NONAUTOMATED: CPT | Mod: HCNC

## 2018-12-05 PROCEDURE — 86140 C-REACTIVE PROTEIN: CPT | Mod: HCNC

## 2018-12-05 PROCEDURE — 36415 COLL VENOUS BLD VENIPUNCTURE: CPT | Mod: HCNC

## 2018-12-06 DIAGNOSIS — M86.9 OSTEOMYELITIS OF ANKLE AND FOOT: Primary | ICD-10-CM

## 2018-12-12 ENCOUNTER — OFFICE VISIT (OUTPATIENT)
Dept: PODIATRY | Facility: CLINIC | Age: 63
End: 2018-12-12
Payer: MEDICARE

## 2018-12-12 VITALS
DIASTOLIC BLOOD PRESSURE: 75 MMHG | BODY MASS INDEX: 30.23 KG/M2 | HEART RATE: 72 BPM | WEIGHT: 216.69 LBS | SYSTOLIC BLOOD PRESSURE: 154 MMHG

## 2018-12-12 DIAGNOSIS — Z89.432 HISTORY OF TRANSMETATARSAL AMPUTATION OF LEFT FOOT: ICD-10-CM

## 2018-12-12 DIAGNOSIS — E11.42 DIABETIC POLYNEUROPATHY ASSOCIATED WITH TYPE 2 DIABETES MELLITUS: ICD-10-CM

## 2018-12-12 DIAGNOSIS — I73.9 PVD (PERIPHERAL VASCULAR DISEASE): ICD-10-CM

## 2018-12-12 DIAGNOSIS — S91.302A OPEN WOUND OF LEFT FOOT, INITIAL ENCOUNTER: Primary | ICD-10-CM

## 2018-12-12 DIAGNOSIS — M86.172 ACUTE OSTEOMYELITIS OF LEFT ANKLE OR FOOT: ICD-10-CM

## 2018-12-12 PROCEDURE — 87070 CULTURE OTHR SPECIMN AEROBIC: CPT | Mod: HCNC

## 2018-12-12 PROCEDURE — 87075 CULTR BACTERIA EXCEPT BLOOD: CPT | Mod: HCNC

## 2018-12-12 PROCEDURE — 99024 POSTOP FOLLOW-UP VISIT: CPT | Mod: HCNC,S$GLB,, | Performed by: PODIATRIST

## 2018-12-12 PROCEDURE — 99999 PR PBB SHADOW E&M-EST. PATIENT-LVL IV: CPT | Mod: PBBFAC,HCNC,, | Performed by: PODIATRIST

## 2018-12-12 NOTE — PROGRESS NOTES
[facility] -   PODIATRIC MEDICINE AND SURGERY  PROGRESS NOTE    CC:   Chief Complaint   Patient presents with    Post-op Evaluation     6 wk f/u       SUBJECTIVE   Lavelle Ladd is a 63 y.o. male status post left revisional transmetatarsal amputation and secondary wound closure final closure November 5, 2018. Pt is approximately 2 weeks post operatively.  Patient has history of poor compliance and relates that he left against medical advice from nursing facility as he was not pleased with standard of care.  He has been changing his dressing at home without any physician or nurse orders.  He presents my clinic today for follow-up evaluation.  Denies any nausea vomiting fever or pain to the surgical site.  He states he has tried to be compliant with nonweightbearing to left lower extremity.  He is being currently managed with p.o. Antibiotics- ZYVOX (6 weeks duration) by Dr. Veliz with Infectious Disease as he did have positive cultures for osteomyelitis on amputation resection sites. He denies any fever. States he has changed dressing on his own every 3 days. He has tried to be compliant with NWB with wheel chair.  Pt requests PICC line removal. States PICC line is bothering him and he is no longer having IV and requests that I remove the PICC line.    12/14/2018  Pt is here for 3 week follow up post operatively. He is currently taking oral Zyvox for osteomyelitis. No complaints. He has remained NWB with wheelchair at home. Denies N/V/F/C.    Hemoglobin A1C   Date Value Ref Range Status   09/19/2018 8.0 (H) 4.0 - 5.6 % Final     Comment:     ADA Screening Guidelines:  5.7-6.4%  Consistent with prediabetes  >or=6.5%  Consistent with diabetes  High levels of fetal hemoglobin interfere with the HbA1C  assay. Heterozygous hemoglobin variants (HbS, HgC, etc)do  not significantly interfere with this assay.   However, presence of multiple variants may affect accuracy.     07/07/2018 10.2 (H) 4.0 - 5.6 %  Final     Comment:     ADA Screening Guidelines:  5.7-6.4%  Consistent with prediabetes  >or=6.5%  Consistent with diabetes  High levels of fetal hemoglobin interfere with the HbA1C  assay. Heterozygous hemoglobin variants (HbS, HgC, etc)do  not significantly interfere with this assay.   However, presence of multiple variants may affect accuracy.     06/12/2018 9.9 (H) 4.0 - 5.6 % Final     Comment:     ADA Screening Guidelines:  5.7-6.4%  Consistent with prediabetes  >or=6.5%  Consistent with diabetes  High levels of fetal hemoglobin interfere with the HbA1C  assay. Heterozygous hemoglobin variants (HbS, HgC, etc)do  not significantly interfere with this assay.   However, presence of multiple variants may affect accuracy.         OBJECTIVE   Vitals:    12/12/18 1521   BP: (!) 154/75   Pulse: 72   Weight: 98.3 kg (216 lb 11.4 oz)       Neurovascular status - vascular status grossly intact.   There is mild serousanguinous drainage central aspect of amputation site, sutures and staples are well coapted except at lateral aspect of stump there is wound dehiscence partial thickness measuring appx 2 cm in length  There is negative malodor noted   There is no erythema, there is no increase in warmth noted     MICRO:  Aerobic Bacterial Culture ENTEROCOCCUS FAECIUM VRE   Rare       Aerobic Bacterial Culture ENTEROBACTER CLOACAE   Rare         Radiographs reviewed:   -  post op.    Pathology:  FINAL PATHOLOGIC DIAGNOSIS  Metatarsal bone, 1-5:  Acute osteomyelitis.  Reactive bone and cartilage.      ASSESSMENT  Encounter Diagnoses   Name Primary?    Open wound of left foot, initial encounter Yes    History of transmetatarsal amputation of left foot     Acute osteomyelitis of left ankle or foot     PVD (peripheral vascular disease)     Diabetic polyneuropathy associated with type 2 diabetes mellitus          PLAN      Open wound of left foot, initial encounter  -     Aerobic culture  -     Culture, Anaerobic  -      Ambulatory referral to Home Health    History of transmetatarsal amputation of left foot  -     Ambulatory referral to Home Health    Acute osteomyelitis of left ankle or foot    PVD (peripheral vascular disease)    Diabetic polyneuropathy associated with type 2 diabetes mellitus      Continue with oral zyvox    -Goal of treatment: Prevention of infection and wound healing  -The wound is cleansed, debrided of foreign material as much as possible, and dressed.  -With patient's permission, the open wound on amputation stump site was sharply excisionally debrided of viable and nonviable tissue down to good bleeding granular tissue base at level of subcutaneous tissue utilizing sterile #15 blade and tissue nipper and forceps. Wound was irrigated with sterile saline and bleeding was controlled with direct pressure. Minimal blood loss. The patient tolerated this well. Wound was then dressed with MARIA LUISA and wet to dry packing. Home health referral placed for wound care.The patient is alerted to watch for any signs of infection (redness, pus, pain, increased swelling or fever) and call if such occurs. Home wound care instructions are provided.    -continue with oral zyvox   -Discussed potential complications with the patient and all questions fully answered.   -RTC as scheduled, or sooner if condition worsens    The patient is alerted to watch for any signs of worsening of infection (redness, pus, pain, increased swelling or fever) and call if such occurs or report to Emergency room if after hours. At risk for loss of limb. Will reassess in 1 week.    Disclaimer:  This note may have been prepared using voice recognition software, it may have not been extensively proofed, as such there could be errors within the text such as sound alike errors.       Future Appointments   Date Time Provider Department Center   12/19/2018  3:20 PM Karla Wheeler DPM [facility] POD [place]   1/7/2019  1:15 PM Ronny Veliz MD [facility] None    1/11/2019 10:30 AM Amira Lynn Jr., [facility]MAN [facility] [place]       Report Electronically Signed By:     Karla Wheeler DPM   Podiatric Medicine & Surgery  [facility]  12/14/2018

## 2018-12-17 LAB — BACTERIA SPEC AEROBE CULT: NO GROWTH

## 2018-12-19 ENCOUNTER — OFFICE VISIT (OUTPATIENT)
Dept: PODIATRY | Facility: CLINIC | Age: 63
End: 2018-12-19
Payer: MEDICARE

## 2018-12-19 VITALS
HEIGHT: 71 IN | DIASTOLIC BLOOD PRESSURE: 82 MMHG | SYSTOLIC BLOOD PRESSURE: 184 MMHG | WEIGHT: 216.69 LBS | HEART RATE: 68 BPM | BODY MASS INDEX: 30.34 KG/M2

## 2018-12-19 DIAGNOSIS — I73.9 PVD (PERIPHERAL VASCULAR DISEASE): ICD-10-CM

## 2018-12-19 DIAGNOSIS — Z89.432 S/P TRANSMETATARSAL AMPUTATION OF FOOT, LEFT: ICD-10-CM

## 2018-12-19 DIAGNOSIS — M86.172 ACUTE OSTEOMYELITIS OF LEFT ANKLE OR FOOT: ICD-10-CM

## 2018-12-19 DIAGNOSIS — Z89.511 HX OF BKA, RIGHT: ICD-10-CM

## 2018-12-19 DIAGNOSIS — T81.31XD POSTOPERATIVE WOUND DEHISCENCE, SUBSEQUENT ENCOUNTER: ICD-10-CM

## 2018-12-19 DIAGNOSIS — S91.302D OPEN WOUND OF LEFT FOOT, SUBSEQUENT ENCOUNTER: Primary | ICD-10-CM

## 2018-12-19 PROCEDURE — 99999 PR PBB SHADOW E&M-EST. PATIENT-LVL IV: CPT | Mod: PBBFAC,HCNC,, | Performed by: PODIATRIST

## 2018-12-19 PROCEDURE — 11042 DBRDMT SUBQ TIS 1ST 20SQCM/<: CPT | Mod: Q7,79,HCNC,S$GLB | Performed by: PODIATRIST

## 2018-12-19 PROCEDURE — 99024 POSTOP FOLLOW-UP VISIT: CPT | Mod: HCNC,S$GLB,, | Performed by: PODIATRIST

## 2018-12-19 RX ORDER — HYDROCODONE BITARTRATE AND ACETAMINOPHEN 10; 325 MG/1; MG/1
TABLET ORAL
Status: ON HOLD | COMMUNITY
Start: 2018-11-27 | End: 2019-09-27 | Stop reason: SDUPTHER

## 2018-12-19 RX ORDER — TRAMADOL HYDROCHLORIDE 200 MG/1
TABLET, EXTENDED RELEASE ORAL
COMMUNITY
Start: 2018-11-26 | End: 2019-04-12

## 2018-12-20 LAB — BACTERIA SPEC ANAEROBE CULT: NORMAL

## 2018-12-20 NOTE — PROGRESS NOTES
PODIATRIC MEDICINE AND SURGERY      CC:   Chief Complaint   Patient presents with    Wound Check     Wound Care, left foot. DOS: 11/05/18       SUBJECTIVE   Lavelle Ladd is a 63 y.o. male status post left revisional transmetatarsal amputation and secondary wound closure final closure November 5, 2018. Pt is approximately 2 weeks post operatively.  Patient has history of poor compliance and relates that he left against medical advice from nursing facility as he was not pleased with standard of care.  He has been changing his dressing at home without any physician or nurse orders.  He presents my clinic today for follow-up evaluation.  Denies any nausea vomiting fever or pain to the surgical site.  He states he has tried to be compliant with nonweightbearing to left lower extremity.  He is being currently managed with p.o. Antibiotics- ZYVOX (6 weeks duration) by Dr. Veliz with Infectious Disease as he did have positive cultures for osteomyelitis on amputation resection sites. He denies any fever. States he has changed dressing on his own every 3 days. He has tried to be compliant with NWB with wheel chair.  Pt requests PICC line removal. States PICC line is bothering him and he is no longer having IV and requests that I remove the PICC line.    12/20/2018  Pt is here for 3 week follow up post operatively. He is currently taking oral Zyvox for osteomyelitis. No complaints. He has remained NWB with wheelchair at home. Denies N/V/F/C. Wound cultures recently taken last week Negative for bacteria. Home health dressing changes are being performed three times weekly. Wound cultures in the past Enterobacter.  is monitoring patient for antibiotic management.     Pt is also here for mobility evaluation. He is 5'11, 216 lbs. He currently uses a wheel chair at home which is difficult to utilize causing strain on his arms. He would like to receive a power wheelchair. He is at risk for limb loss. Still undergoing  "treatment for foot amputation. He is not able to safely ambulate without assistance until the wound heals. He is on strict non weight bearing protocol to prevent loss of his leg. Pt is willing and able to perform bating, toileting, grooming and eating with a Power chair. The medical conditions limiting ambulation is an amputation of his left foot and amputation of RIGHT leg. He is a double amputee and would benefit from power wheel chair. He is unable to use a walker or a scooter  as he is unable to WALK on left foot due to recent amputation. He is unable to use a manual chair as he is a double amputee.      Hemoglobin A1C   Date Value Ref Range Status   09/19/2018 8.0 (H) 4.0 - 5.6 % Final     Comment:     ADA Screening Guidelines:  5.7-6.4%  Consistent with prediabetes  >or=6.5%  Consistent with diabetes  High levels of fetal hemoglobin interfere with the HbA1C  assay. Heterozygous hemoglobin variants (HbS, HgC, etc)do  not significantly interfere with this assay.   However, presence of multiple variants may affect accuracy.     07/07/2018 10.2 (H) 4.0 - 5.6 % Final     Comment:     ADA Screening Guidelines:  5.7-6.4%  Consistent with prediabetes  >or=6.5%  Consistent with diabetes  High levels of fetal hemoglobin interfere with the HbA1C  assay. Heterozygous hemoglobin variants (HbS, HgC, etc)do  not significantly interfere with this assay.   However, presence of multiple variants may affect accuracy.     06/12/2018 9.9 (H) 4.0 - 5.6 % Final     Comment:     ADA Screening Guidelines:  5.7-6.4%  Consistent with prediabetes  >or=6.5%  Consistent with diabetes  High levels of fetal hemoglobin interfere with the HbA1C  assay. Heterozygous hemoglobin variants (HbS, HgC, etc)do  not significantly interfere with this assay.   However, presence of multiple variants may affect accuracy.         OBJECTIVE   Vitals:    12/19/18 1514   BP: (!) 184/82   Pulse: 68   Weight: 98.3 kg (216 lb 11.4 oz)   Height: 5' 11" (1.803 m) "     LOWER EXTREMITY PHYSICAL EXAM  · Neurovascular status - vascular status grossly intact.   · There is sanguinous drainage central lateral aspect of amputation site measuring appx 3.5 cm x 0.5 cm x 0.2 cm, sutures and staples central incision, medial partial wound dehiscence measuring appx 2.0 cm x 0.5 cm.   · There is negative malodor noted; beefy granular wound base both incision sites.  · Lower extremity muscle strength: 2/5 Poor , Upper extremity strength 3/5= Fair            Radiographs reviewed:   -  post op.    Pathology:  FINAL PATHOLOGIC DIAGNOSIS  Metatarsal bone, 1-5:  Acute osteomyelitis.  Reactive bone and cartilage.      ASSESSMENT  Encounter Diagnoses   Name Primary?    Open wound of left foot, subsequent encounter Yes    Acute osteomyelitis of left ankle or foot     PVD (peripheral vascular disease)     S/P transmetatarsal amputation of foot, left     Postoperative wound dehiscence, subsequent encounter          PLAN  RX for Power wheel chair and mobility evaluation included in note. Rx faxed to [facility] and placed on file    Continue with oral zyvox; Discussed with Dr. Veliz who will add an additional antibiotic for chronic draining sinus. Pt is scheduled to follow up with Dr. Veliz.    -Goal of treatment: Prevention of infection and wound healing  -The wound is cleansed, debrided of foreign material as much as possible, and dressed.  -With patient's permission, the open wound on amputation stump sites were sharply excisionally debrided of viable and nonviable tissue down to good bleeding granular tissue base at level of subcutaneous tissue utilizing sterile #15 blade and tissue nipper and forceps. Wound was irrigated with sterile saline and bleeding was controlled with direct pressure. Minimal blood loss. The patient tolerated this well. Wound was then dressed with MARIA LUISA and wet to dry packing. The patient is alerted to watch for any signs of infection (redness, pus, pain, increased  swelling or fever) and call if such occurs. Home wound care instructions are provided.    -Discussed potential complications with the patient and all questions fully answered.   -RTC as scheduled with my colleague, Dr. Guthrie for wound care as I will be out of the office.    The total visit time face to face with the patient was over 30 minutes. Time spent in counseling, discussion and coordination of care with the patient was over 15 minutes. Topics discussed as noted above.    The patient is alerted to watch for any signs of worsening of infection (redness, pus, pain, increased swelling or fever) and call if such occurs or report to Emergency room if after hours. At risk for loss of limb.     Disclaimer:  This note may have been prepared using voice recognition software, it may have not been extensively proofed, as such there could be errors within the text such as sound alike errors.       Future Appointments   Date Time Provider Department Center   12/27/2018  2:30 PM Tex Guthrie DPM [facility]   1/7/2019  1:15 PM Ronny Veliz MD [facility] None   1/9/2019  8:20 AM Karla Wheeler DPM [facility] POD [place]   1/11/2019 10:30 AM Amira Lynn Jr., PAGladisC [facility] [place]       Report Electronically Signed By:     Karla Wheeler DPM   Podiatric Medicine & Surgery  [facility]  12/20/2018

## 2018-12-27 ENCOUNTER — OFFICE VISIT (OUTPATIENT)
Dept: PODIATRY | Facility: CLINIC | Age: 63
End: 2018-12-27
Payer: MEDICARE

## 2018-12-27 VITALS
RESPIRATION RATE: 16 BRPM | SYSTOLIC BLOOD PRESSURE: 139 MMHG | BODY MASS INDEX: 30.23 KG/M2 | HEART RATE: 68 BPM | DIASTOLIC BLOOD PRESSURE: 61 MMHG | HEIGHT: 71 IN

## 2018-12-27 DIAGNOSIS — I73.9 PERIPHERAL VASCULAR DISEASE: ICD-10-CM

## 2018-12-27 DIAGNOSIS — I25.10 CORONARY ARTERY DISEASE INVOLVING NATIVE CORONARY ARTERY OF NATIVE HEART WITHOUT ANGINA PECTORIS: ICD-10-CM

## 2018-12-27 DIAGNOSIS — N18.30 STAGE 3 CHRONIC KIDNEY DISEASE: ICD-10-CM

## 2018-12-27 DIAGNOSIS — Z89.511 HX OF RIGHT BKA: ICD-10-CM

## 2018-12-27 DIAGNOSIS — J44.9 CHRONIC OBSTRUCTIVE PULMONARY DISEASE, UNSPECIFIED COPD TYPE: ICD-10-CM

## 2018-12-27 DIAGNOSIS — L97.422 DIABETIC ULCER OF LEFT MIDFOOT ASSOCIATED WITH TYPE 2 DIABETES MELLITUS, WITH FAT LAYER EXPOSED: Primary | ICD-10-CM

## 2018-12-27 DIAGNOSIS — I13.10 MALIGNANT HTN WITH HEART DISEASE, W/O CHF, WITH CHRONIC KIDNEY DISEASE: ICD-10-CM

## 2018-12-27 DIAGNOSIS — E11.621 DIABETIC ULCER OF LEFT MIDFOOT ASSOCIATED WITH TYPE 2 DIABETES MELLITUS, WITH FAT LAYER EXPOSED: Primary | ICD-10-CM

## 2018-12-27 DIAGNOSIS — Z89.432 S/P TRANSMETATARSAL AMPUTATION OF FOOT, LEFT: ICD-10-CM

## 2018-12-27 DIAGNOSIS — I50.31 ACUTE DIASTOLIC HEART FAILURE: ICD-10-CM

## 2018-12-27 DIAGNOSIS — E11.42 TYPE 2 DIABETES MELLITUS WITH PERIPHERAL NEUROPATHY: ICD-10-CM

## 2018-12-27 PROCEDURE — 11042 DBRDMT SUBQ TIS 1ST 20SQCM/<: CPT | Mod: 79,HCNC,S$GLB, | Performed by: PODIATRIST

## 2018-12-27 PROCEDURE — 99024 POSTOP FOLLOW-UP VISIT: CPT | Mod: HCNC,S$GLB,, | Performed by: PODIATRIST

## 2018-12-27 PROCEDURE — 99999 PR PBB SHADOW E&M-EST. PATIENT-LVL III: CPT | Mod: PBBFAC,HCNC,, | Performed by: PODIATRIST

## 2018-12-27 NOTE — PROGRESS NOTES
Subjective:       Patient ID: Lavelle Ladd is a 63 y.o. male.    Chief Complaint: Post-op Evaluation (c/o right foot pain, rates pain 6/10, wears walking boot with socks, diabetic Pt, PCP Dr. Esteban)      HPI:  Lavelle Ladd presents to the office today, s/p 10/31/2018 left TMA by Dr. Karla Wheeler, followed by subsequent 2018 incision and drainage with wound irrigation and closure.  Patient does have a contralateral below-knee amputation.  Patient presents ambulating with a wheelchair.  States local wound care with [facility] for x3 weekly flush and pack.  Denies infective signs, either locally or systemic.  Patient ambulatory with a walking boot on the left lower extremity.  States pains are mild approximately 4/10.  Denies edema of the calf musculature on the left. Rishabh Esteban MD is his internal medicine provider.  He states the wound is healing progressively.  Patient also states that Dr. Wheeler did fill out and complete paperwork for motorized wheelchair, but this request was denied by the [email or web address]; he is in need of the recent operative reports.    Review of patient's allergies indicates:  No Known Allergies    Past Medical History:   Diagnosis Date    DINORAH (acute kidney injury) 2016    Arthritis     Chronic obstructive pulmonary disease 2016    Coronary artery disease involving native coronary artery of native heart without angina pectoris 2016     donor kidney transplant for DM 16     Induction with Thymo x3 and IV solumedrol to total 875mg  Kidney Biopsy  2016: 16 glomeruli, ACR type 1 AVR type 2, significant microcirculatory changes, c4d negative, No DSA, 5 to10% fibrosis. Treated with thymo x8 2016- no rejection      Diastolic heart failure     Encounter for blood transfusion     ESRD on RRT since 10/2013 10/29/2013    Biopsy proven diabetic nephropathy and lymphoplasmacytic interstitial infiltrate not c/w with AIN (ddx sjogrens or  assoc with tamm-horsefall protein extravasation)     GERD (gastroesophageal reflux disease)     History of hepatitis C, s/p successful Rx w/ SVR12 - 2017    Completed 12 weeks harvoni w/ SVR    Hyperlipidemia     Hypertension     PIC line (peripherally inserted central catheter) flush     Prophylactic immunotherapy     Proteinuria     PVD (peripheral vascular disease) 2017    RLE BKA CT 16 Extensive atherosclerotic disease of the aorta and mesenteric arteries.     Renal hypertension     Type 2 diabetes mellitus with diabetic neuropathy, with long-term current use of insulin 2016    Vitamin B12 deficiency        Family History   Problem Relation Age of Onset    Cancer Father     Diabetes Father     Heart failure Father     Stroke Father     Heart failure Mother     Kidney disease Neg Hx        Social History     Socioeconomic History    Marital status: Single     Spouse name: Not on file    Number of children: Not on file    Years of education: Not on file    Highest education level: Not on file   Social Needs    Financial resource strain: Not on file    Food insecurity - worry: Not on file    Food insecurity - inability: Not on file    Transportation needs - medical: Not on file    Transportation needs - non-medical: Not on file   Occupational History    Occupation: Disabled     Employer: DISABLED   Tobacco Use    Smoking status: Former Smoker     Packs/day: 1.00     Years: 40.00     Pack years: 40.00     Last attempt to quit: 2013     Years since quittin.9    Smokeless tobacco: Never Used   Substance and Sexual Activity    Alcohol use: Yes     Alcohol/week: 3.6 oz     Types: 6 Cans of beer per week     Comment: 6 beers/week    Drug use: No    Sexual activity: No   Other Topics Concern    Not on file   Social History Narrative    Lives alone. Retired from construction and various jobs, now retired. Would like to return to work PT to alleviate  belen.       Past Surgical History:   Procedure Laterality Date    AMPUTATION, FOOT, TRANSMETATARSAL Left 11/5/2018    Performed by Karla Wheeler DPM at [facility] OR    AMPUTATION, FOOT, TRANSMETATARSAL Left 10/31/2018    Performed by Karla Wheeler DPM at [facility] OR    AMPUTATION, FOOT, TRANSMETATARSAL Left 9/21/2018    Performed by Karla Wheeler DPM at [facility] OR    av bovine graft      Left UE    AV FISTULA PLACEMENT      left UE    BIOPSY Tranplanted Kidney N/A 8/15/2016    Performed by [facility] H[facility] at [facility] OR 2ND FLR    BIOPSY, LIVER, TRANSJUGULAR APPROACH N/A 4/24/2014    Performed by [facility] at [facility] CATH LAB    CARDIAC CATHETERIZATION  02/2015    CLOSURE, WOUND Left 11/5/2018    Performed by Karla Wheeler DPM at [facility] OR    CLOSURE, WOUND Left 9/24/2018    Performed by Karla Wheeler DPM at [facility] OR    DEBRIDEMENT, METATARSAL BONE, 2 OR MORE BONES Left 11/5/2018    Performed by Karla Wheeler DPM at [facility] OR    INSERTION, CATHETER, VASCULAR N/A 10/31/2013    Performed by Ralph Mckeon MD at [facility] CATH LAB    IRRIGATION AND DEBRIDEMENT Left 7/9/2018    Performed by Katerina Magaña DPM at [facility] OR    IRRIGATION AND DEBRIDEMENT, LOWER EXTREMITY Left 10/31/2018    Performed by Karla Wheeler DPM at [facility] OR    KIDNEY TRANSPLANT  05/21/2016    LEFT LEG ANGIOGRAM N/A 6/14/2018    Performed by Donal Mcdonald MD at [facility] CATH LAB    LEG AMPUTATION THROUGH KNEE  2011    right LE, started as nail puncture leading to diabetic ulcer    LENGTHENING, TENDON, ACHILLES Left 9/24/2018    Performed by Karla Wheeler DPM at [facility] OR    TRANSPLANT-KIDNEY Right 5/21/2016    Performed by Ronny Bergeron MD at [facility] OR 2ND FLR       Review of Systems   Constitutional: Negative for chills, fatigue and fever.   HENT: Negative for hearing loss.    Eyes: Negative for photophobia and visual disturbance.   Respiratory: Negative for cough, chest tightness, shortness of  "breath and wheezing.    Cardiovascular: Negative for chest pain and palpitations.   Gastrointestinal: Negative for constipation, diarrhea, nausea and vomiting.   Endocrine: Negative for cold intolerance and heat intolerance.   Genitourinary: Negative for flank pain.   Musculoskeletal: Positive for arthralgias and gait problem. Negative for neck pain and neck stiffness.   Skin: Positive for wound.   Neurological: Positive for numbness. Negative for light-headedness and headaches.   Psychiatric/Behavioral: Negative for sleep disturbance.          Objective:   /61 (BP Location: Right arm, Patient Position: Sitting, BP Method: Large (Automatic))   Pulse 68   Resp 16   Ht 5' 11" (1.803 m)   BMI 30.23 kg/m²       LOWER EXTREMITY PHYSICAL EXAMINATION  NEUROLOGY: Protective sensation is not intact to the left and right plantar surfaces of the foot and digits, as the patient has no sensation/detection at greater than 4 distinct points of contact with 5.07 Polo Jean Pierre monofilament. Sensation to light touch is intact on the left and right foot. Proprioception is intact, bilateral. Sensation to pin prick is reduced to absent. Vibratory sensation is diminished.    VASCULAR: On the left foot, the dorsalis pedis pulse is 1/4 and the posterior tibial pulse is 1/4. Capillary refill time is less than 3 seconds. Hair growth is present on the dorsum of the foot and at the digits. No rubor is present. Proximal to distal temperature is warm to warm. No ipsilateral contralateral calf pain or tenderness is noted with palpation and compression. No palpable cords noted.    DERMATOLOGY:  The majority of the transmetatarsal incision site of the left foot is healed and coapted.  There is one minor portion, laterally, which remains with a wound dehiscence/delayed closure.  This area measures approximately 1.25 cm by 5mm x 3mm.  There is no probe to bone or osseous exposure.  The area is the level of subcutaneous tissues with fat " layer exposure.  No undermining or drainage noted. No abscess formation.  No periwound erythema or cellulitis noted.  No fluctuance.    ORTHOPEDIC:  Transmetatarsal amputation, left lower extremity.  Below-knee amputation, right lower extremity.      Assessment:     1. Diabetic ulcer of left midfoot associated with type 2 diabetes mellitus, with fat layer exposed    2. Type 2 diabetes mellitus with peripheral neuropathy    3. S/P transmetatarsal amputation of foot, left    4. Chronic obstructive pulmonary disease, unspecified COPD type    5. Malignant HTN with heart disease, w/o CHF, with chronic kidney disease    6. Acute diastolic heart failure    7. Coronary artery disease involving native coronary artery of native heart without angina pectoris    8. Peripheral vascular disease    9. Stage 3 chronic kidney disease    10. Hx of right BKA          Plan:     Diabetic ulcer of left midfoot associated with type 2 diabetes mellitus, with fat layer exposed  Thorough discussion is had with the patient today, concerning the diagnosis, its etiology, and the treatment algorithm at present.    The wound was surgically debrided after adequate prep with alcohol and/or betadine paint. Excisional wound debridement was performed using sharp #10/#15 blade/rounded scalpel and tissue nipper, with removal of all non-viable skin and soft tissues; necrotic skin/tissue formation. The woundbase/wound bed was also debrided to encourage bleeding as to promote/stimulate healing. Debridement was excisional and included epidermal, dermal and subcutaneous tissues. Post debridement measurements are as above. Hemostasis was achieved. Patient tolerated procedure well and without complications. Local woundcare with collagen dressings and bandage thereafter. Patient will change the collagen dressings Q3 - Q4 days in standard fashion.    Patient may continue collagen dressings as out-patient with [facility]. No signs infection are noted at  this time.  Patient may continue ambulation with the tall CAM Walker with weight on the heel bone ONLY with the assistance of a cane or crutch to the contralateral extremity.    Operative notes are provided from the 10/31/2018 and 11/05/2018 procedures to provide to the DME company for motorized wheelchair dispensing.      Type 2 diabetes mellitus with peripheral neuropathy  S/P transmetatarsal amputation of foot, left  Hx of right BKA  Patient advised to follow up with Primary Care Physician for management of comorbid states.    (Diabetic) Foot counseling and education is provided at this visit. Patient is advised to wear socks and shoes at all times.  Do not walk barefoot, or with just socks, even when indoors.  Be sure to check and inspect the inside of the shoe before putting them on her feet.  Protect your feet at all times.  Walking shoes and/or athletic shoes are the best types of shoe gear. Do not wear vinyl or plastic type shoe gear, as they do not stretch and/or breathe.  Protect your feet from hot and/or cold. Elevate the extremities when sitting.  Do not wear excessively tight socks and/or shoe gear. Wiggle your toes for a few minutes throughout the day. Move your ankles up and down, in and out, to help blood flow in your lower extremity.     Chronic obstructive pulmonary disease, unspecified COPD type  Patient advised to follow up with or Primary Care Provider or Pulmonologist for management of comorbid states.    Malignant HTN with heart disease, w/o CHF, with chronic kidney disease  Acute diastolic heart failure  Coronary artery disease involving native coronary artery of native heart without angina pectoris  Peripheral vascular disease  Patient advised to follow up with Primary Care Provider and/or Cardiologist for management of comorbid cardiac states.     Stage 3 chronic kidney disease  Patient advised to follow up with Nephrologist for management of kidney pathology.        Future Appointments    Date Time Provider Department Center   1/7/2019  1:15 PM Ronny Veliz MD [facility] None   1/9/2019  7:40 AM Karla Wheeler DPM [facility] [place]   1/9/2019  8:20 AM Karla Wheeler DPM [facility] [place]   1/11/2019 10:30 AM Amira Lynn Jr., PA-C [facility] [address]

## 2019-01-04 ENCOUNTER — TELEPHONE (OUTPATIENT)
Dept: PODIATRY | Facility: CLINIC | Age: 64
End: 2019-01-04

## 2019-01-04 NOTE — TELEPHONE ENCOUNTER
----- Message from Alexia Hamilton sent at 1/4/2019 11:49 AM CST -----  Contact: Kayla - [facility] States updated wound care orders are needed for this pt, can be reached at [phone]///[phone]

## 2019-01-04 NOTE — TELEPHONE ENCOUNTER
Returned Home Health Nurses callMonique and was able to give her a verbal for his wound care orders.

## 2019-01-07 ENCOUNTER — LAB VISIT (OUTPATIENT)
Dept: LAB | Facility: HOSPITAL | Age: 64
End: 2019-01-07
Attending: INTERNAL MEDICINE
Payer: MEDICARE

## 2019-01-07 DIAGNOSIS — M86.9 OSTEOMYELITIS OF ANKLE AND FOOT: ICD-10-CM

## 2019-01-07 LAB
ALBUMIN SERPL BCP-MCNC: 3.8 G/DL
ALP SERPL-CCNC: 93 U/L
ALT SERPL W/O P-5'-P-CCNC: 10 U/L
ANION GAP SERPL CALC-SCNC: 10 MMOL/L
ANISOCYTOSIS BLD QL SMEAR: SLIGHT
AST SERPL-CCNC: 15 U/L
BASOPHILS NFR BLD: 1 %
BILIRUB SERPL-MCNC: 0.4 MG/DL
BUN SERPL-MCNC: 22 MG/DL
CALCIUM SERPL-MCNC: 9.7 MG/DL
CHLORIDE SERPL-SCNC: 102 MMOL/L
CO2 SERPL-SCNC: 26 MMOL/L
CREAT SERPL-MCNC: 1.7 MG/DL
CRP SERPL-MCNC: 1.4 MG/L
DIFFERENTIAL METHOD: ABNORMAL
EOSINOPHIL NFR BLD: 3 %
ERYTHROCYTE [DISTWIDTH] IN BLOOD BY AUTOMATED COUNT: 19.3 %
ERYTHROCYTE [SEDIMENTATION RATE] IN BLOOD BY WESTERGREN METHOD: 6 MM/HR
EST. GFR  (AFRICAN AMERICAN): 47.9 ML/MIN/1.73 M^2
EST. GFR  (NON AFRICAN AMERICAN): 41.4 ML/MIN/1.73 M^2
GLUCOSE SERPL-MCNC: 214 MG/DL
HCT VFR BLD AUTO: 40.5 %
HGB BLD-MCNC: 12.2 G/DL
HYPOCHROMIA BLD QL SMEAR: ABNORMAL
IMM GRANULOCYTES # BLD AUTO: ABNORMAL K/UL
IMM GRANULOCYTES NFR BLD AUTO: ABNORMAL %
LYMPHOCYTES NFR BLD: 21 %
MCH RBC QN AUTO: 29.8 PG
MCHC RBC AUTO-ENTMCNC: 30.1 G/DL
MCV RBC AUTO: 99 FL
MONOCYTES NFR BLD: 3 %
NEUTROPHILS NFR BLD: 68 %
NEUTS BAND NFR BLD MANUAL: 4 %
NRBC BLD-RTO: 0 /100 WBC
PLATELET # BLD AUTO: 155 K/UL
PLATELET BLD QL SMEAR: ABNORMAL
PMV BLD AUTO: 10.7 FL
POLYCHROMASIA BLD QL SMEAR: ABNORMAL
POTASSIUM SERPL-SCNC: 4.7 MMOL/L
PROT SERPL-MCNC: 7.2 G/DL
RBC # BLD AUTO: 4.1 M/UL
SODIUM SERPL-SCNC: 138 MMOL/L
WBC # BLD AUTO: 4.71 K/UL
WBC TOXIC VACUOLES BLD QL SMEAR: PRESENT

## 2019-01-07 PROCEDURE — 80053 COMPREHEN METABOLIC PANEL: CPT | Mod: HCNC

## 2019-01-07 PROCEDURE — 85027 COMPLETE CBC AUTOMATED: CPT | Mod: HCNC

## 2019-01-07 PROCEDURE — 85007 BL SMEAR W/DIFF WBC COUNT: CPT | Mod: HCNC

## 2019-01-07 PROCEDURE — 36415 COLL VENOUS BLD VENIPUNCTURE: CPT | Mod: HCNC

## 2019-01-07 PROCEDURE — 85651 RBC SED RATE NONAUTOMATED: CPT | Mod: HCNC

## 2019-01-07 PROCEDURE — 86140 C-REACTIVE PROTEIN: CPT | Mod: HCNC

## 2019-01-09 ENCOUNTER — OFFICE VISIT (OUTPATIENT)
Dept: PODIATRY | Facility: CLINIC | Age: 64
End: 2019-01-09
Payer: MEDICARE

## 2019-01-09 DIAGNOSIS — E11.42 TYPE 2 DIABETES MELLITUS WITH PERIPHERAL NEUROPATHY: ICD-10-CM

## 2019-01-09 DIAGNOSIS — I73.9 PVD (PERIPHERAL VASCULAR DISEASE): ICD-10-CM

## 2019-01-09 DIAGNOSIS — Z89.432 S/P TRANSMETATARSAL AMPUTATION OF FOOT, LEFT: Primary | ICD-10-CM

## 2019-01-09 DIAGNOSIS — Z89.511 HX OF RIGHT BKA: ICD-10-CM

## 2019-01-09 PROCEDURE — 99999 PR PBB SHADOW E&M-EST. PATIENT-LVL IV: ICD-10-PCS | Mod: PBBFAC,HCNC,, | Performed by: PODIATRIST

## 2019-01-09 PROCEDURE — 99024 PR POST-OP FOLLOW-UP VISIT: ICD-10-PCS | Mod: HCNC,S$GLB,, | Performed by: PODIATRIST

## 2019-01-09 PROCEDURE — 99999 PR PBB SHADOW E&M-EST. PATIENT-LVL IV: CPT | Mod: PBBFAC,HCNC,, | Performed by: PODIATRIST

## 2019-01-09 PROCEDURE — 99024 POSTOP FOLLOW-UP VISIT: CPT | Mod: HCNC,S$GLB,, | Performed by: PODIATRIST

## 2019-01-09 NOTE — PROGRESS NOTES
PODIATRIC MEDICINE AND SURGERY      CC:   Chief Complaint   Patient presents with    Post-op Evaluation     wound care pain rated at 5/10       SUBJECTIVE   Lavelle Ladd is a 63 y.o. male status post left revisional transmetatarsal amputation and secondary wound closure final closure November 5, 2018. Pt is approximately 2 weeks post operatively.  Patient has history of poor compliance and relates that he left against medical advice from nursing facility as he was not pleased with standard of care.  He has been changing his dressing at home without any physician or nurse orders.  He presents my clinic today for follow-up evaluation.  Denies any nausea vomiting fever or pain to the surgical site.  He states he has tried to be compliant with nonweightbearing to left lower extremity.  He is being currently managed with p.o. Antibiotics- ZYVOX (6 weeks duration) by Dr. Veliz with Infectious Disease as he did have positive cultures for osteomyelitis on amputation resection sites. He denies any fever. States he has changed dressing on his own every 3 days. He has tried to be compliant with NWB with wheel chair.  Pt requests PICC line removal. States PICC line is bothering him and he is no longer having IV and requests that I remove the PICC line.    12/19/18  Pt is here for 3 week follow up post operatively. He is currently taking oral Zyvox for osteomyelitis. No complaints. He has remained NWB with wheelchair at home. Denies N/V/F/C. Wound cultures recently taken last week Negative for bacteria. Home health dressing changes are being performed three times weekly. Wound cultures in the past Enterobacter.  is monitoring patient for antibiotic management.     Pt is also here for mobility evaluation. He is 5'11, 216 lbs. He currently uses a wheel chair at home which is difficult to utilize causing strain on his arms. He would like to receive a power wheelchair. He is at risk for limb loss. Still undergoing  treatment for foot amputation. He is not able to safely ambulate without assistance until the wound heals. He is on strict non weight bearing protocol to prevent loss of his leg. Pt is willing and able to perform bating, toileting, grooming and eating with a Power chair. The medical conditions limiting ambulation is an amputation of his left foot and amputation of RIGHT leg. He is a double amputee and would benefit from power wheel chair. He is unable to use a walker or a scooter  as he is unable to WALK on left foot due to recent amputation. He is unable to use a manual chair as he is a double amputee.       1/9/2019  Pt is here for wound care follow up. Wound has progressed with signs of healing. He is partially ambulating in CAM boot. Requests new prosthesis for R BKA. He has stopped Zyvox per ID recommendations. He has no further pedal complaints at this time.     Hemoglobin A1C   Date Value Ref Range Status   09/19/2018 8.0 (H) 4.0 - 5.6 % Final     Comment:     ADA Screening Guidelines:  5.7-6.4%  Consistent with prediabetes  >or=6.5%  Consistent with diabetes  High levels of fetal hemoglobin interfere with the HbA1C  assay. Heterozygous hemoglobin variants (HbS, HgC, etc)do  not significantly interfere with this assay.   However, presence of multiple variants may affect accuracy.     07/07/2018 10.2 (H) 4.0 - 5.6 % Final     Comment:     ADA Screening Guidelines:  5.7-6.4%  Consistent with prediabetes  >or=6.5%  Consistent with diabetes  High levels of fetal hemoglobin interfere with the HbA1C  assay. Heterozygous hemoglobin variants (HbS, HgC, etc)do  not significantly interfere with this assay.   However, presence of multiple variants may affect accuracy.     06/12/2018 9.9 (H) 4.0 - 5.6 % Final     Comment:     ADA Screening Guidelines:  5.7-6.4%  Consistent with prediabetes  >or=6.5%  Consistent with diabetes  High levels of fetal hemoglobin interfere with the HbA1C  assay. Heterozygous hemoglobin  variants (HbS, HgC, etc)do  not significantly interfere with this assay.   However, presence of multiple variants may affect accuracy.         OBJECTIVE   There were no vitals filed for this visit.  LOWER EXTREMITY PHYSICAL EXAM  · Neurovascular status - vascular status grossly intact.   ·  There is small partial opening distal lateral stump partial thickness measuring appx 1.5 cm x 0.5 cm, scant drainage sanguinous post debridement, no acute SOI                Radiographs reviewed:   -  post op.    Pathology:  FINAL PATHOLOGIC DIAGNOSIS  Metatarsal bone, 1-5:  Acute osteomyelitis.  Reactive bone and cartilage.      ASSESSMENT  Encounter Diagnoses   Name Primary?    S/P transmetatarsal amputation of foot, left Yes    Type 2 diabetes mellitus with peripheral neuropathy     Hx of right BKA     PVD (peripheral vascular disease)          PLAN  Wound almost completely healed. Ok to full WB. Consider santyl vs continued MARIA LUISA  -Goal of treatment: Prevention of infection and wound healing  -The wound is cleansed, debrided of foreign material as much as possible, and dressed.  -With patient's permission, the open wound on amputation stump sites were sharply excisionally debrided of viable and nonviable tissue down to good bleeding granular tissue base at level of subcutaneous tissue utilizing sterile #15 blade and tissue nipper and forceps. Wound was irrigated with sterile saline and bleeding was controlled with direct pressure. Minimal blood loss. The patient tolerated this well. Wound was then dressed with MARIA LUISA. The patient is alerted to watch for any signs of infection (redness, pus, pain, increased swelling or fever) and call if such occurs. Home wound care instructions are provided.    The patient is alerted to watch for any signs of worsening of infection (redness, pus, pain, increased swelling or fever) and call if such occurs or report to Emergency room if after hours. At risk for loss of limb.      Disclaimer:  This note may have been prepared using voice recognition software, it may have not been extensively proofed, as such there could be errors within the text such as sound alike errors.       Future Appointments   Date Time Provider Department Center   1/11/2019 10:30 AM Amira Lynn Jr., PACHANDAN [facility]    1/23/2019  9:00 AM Karla Wheeler DPM [facility] P[facility] [place]   4/1/2019  1:00 PM Ronny Veliz MD [facility] None       Report Electronically Signed By:     Karla Wheeler DPM   Podiatric Medicine & Surgery  [facility]  1/9/2019

## 2019-01-13 DIAGNOSIS — Z94.0 KIDNEY REPLACED BY TRANSPLANT: Primary | ICD-10-CM

## 2019-01-14 ENCOUNTER — OFFICE VISIT (OUTPATIENT)
Dept: DIABETES | Facility: CLINIC | Age: 64
End: 2019-01-14
Payer: MEDICARE

## 2019-01-14 VITALS
BODY MASS INDEX: 30.65 KG/M2 | DIASTOLIC BLOOD PRESSURE: 84 MMHG | SYSTOLIC BLOOD PRESSURE: 134 MMHG | HEIGHT: 71 IN | WEIGHT: 218.94 LBS

## 2019-01-14 DIAGNOSIS — E11.65 TYPE 2 DIABETES MELLITUS WITH HYPERGLYCEMIA, WITH LONG-TERM CURRENT USE OF INSULIN: Primary | ICD-10-CM

## 2019-01-14 DIAGNOSIS — Z79.4 TYPE 2 DIABETES MELLITUS WITH HYPERGLYCEMIA, WITH LONG-TERM CURRENT USE OF INSULIN: Primary | ICD-10-CM

## 2019-01-14 LAB — GLUCOSE SERPL-MCNC: 208 MG/DL (ref 70–110)

## 2019-01-14 PROCEDURE — 3075F PR MOST RECENT SYSTOLIC BLOOD PRESS GE 130-139MM HG: ICD-10-PCS | Mod: CPTII,HCNC,S$GLB, | Performed by: PHYSICIAN ASSISTANT

## 2019-01-14 PROCEDURE — 3045F PR MOST RECENT HEMOGLOBIN A1C LEVEL 7.0-9.0%: CPT | Mod: CPTII,HCNC,S$GLB, | Performed by: PHYSICIAN ASSISTANT

## 2019-01-14 PROCEDURE — 99999 PR PBB SHADOW E&M-EST. PATIENT-LVL III: ICD-10-PCS | Mod: PBBFAC,HCNC,, | Performed by: PHYSICIAN ASSISTANT

## 2019-01-14 PROCEDURE — 95250 CONT GLUC MNTR PHYS/QHP EQP: CPT | Mod: HCNC,S$GLB,, | Performed by: PHYSICIAN ASSISTANT

## 2019-01-14 PROCEDURE — 3079F PR MOST RECENT DIASTOLIC BLOOD PRESSURE 80-89 MM HG: ICD-10-PCS | Mod: CPTII,HCNC,S$GLB, | Performed by: PHYSICIAN ASSISTANT

## 2019-01-14 PROCEDURE — 99214 PR OFFICE/OUTPT VISIT, EST, LEVL IV, 30-39 MIN: ICD-10-PCS | Mod: HCNC,S$GLB,, | Performed by: PHYSICIAN ASSISTANT

## 2019-01-14 PROCEDURE — 3075F SYST BP GE 130 - 139MM HG: CPT | Mod: CPTII,HCNC,S$GLB, | Performed by: PHYSICIAN ASSISTANT

## 2019-01-14 PROCEDURE — 95250 PR GLUCOSE MONITORING,72 HRS,SUB-Q SENSOR: ICD-10-PCS | Mod: HCNC,S$GLB,, | Performed by: PHYSICIAN ASSISTANT

## 2019-01-14 PROCEDURE — 3079F DIAST BP 80-89 MM HG: CPT | Mod: CPTII,HCNC,S$GLB, | Performed by: PHYSICIAN ASSISTANT

## 2019-01-14 PROCEDURE — 1101F PR PT FALLS ASSESS DOC 0-1 FALLS W/OUT INJ PAST YR: ICD-10-PCS | Mod: CPTII,HCNC,S$GLB, | Performed by: PHYSICIAN ASSISTANT

## 2019-01-14 PROCEDURE — 3045F PR MOST RECENT HEMOGLOBIN A1C LEVEL 7.0-9.0%: ICD-10-PCS | Mod: CPTII,HCNC,S$GLB, | Performed by: PHYSICIAN ASSISTANT

## 2019-01-14 PROCEDURE — 99214 OFFICE O/P EST MOD 30 MIN: CPT | Mod: HCNC,S$GLB,, | Performed by: PHYSICIAN ASSISTANT

## 2019-01-14 PROCEDURE — 3008F PR BODY MASS INDEX (BMI) DOCUMENTED: ICD-10-PCS | Mod: CPTII,HCNC,S$GLB, | Performed by: PHYSICIAN ASSISTANT

## 2019-01-14 PROCEDURE — 1101F PT FALLS ASSESS-DOCD LE1/YR: CPT | Mod: CPTII,HCNC,S$GLB, | Performed by: PHYSICIAN ASSISTANT

## 2019-01-14 PROCEDURE — 99999 PR PBB SHADOW E&M-EST. PATIENT-LVL III: CPT | Mod: PBBFAC,HCNC,, | Performed by: PHYSICIAN ASSISTANT

## 2019-01-14 PROCEDURE — 3008F BODY MASS INDEX DOCD: CPT | Mod: CPTII,HCNC,S$GLB, | Performed by: PHYSICIAN ASSISTANT

## 2019-01-14 RX ORDER — INSULIN DEGLUDEC 100 U/ML
30 INJECTION, SOLUTION SUBCUTANEOUS NIGHTLY
Qty: 15 ML | Refills: 11 | Status: SHIPPED | OUTPATIENT
Start: 2019-01-14 | End: 2019-02-21 | Stop reason: SDUPTHER

## 2019-01-14 RX ORDER — PEN NEEDLE, DIABETIC 30 GX3/16"
1 NEEDLE, DISPOSABLE MISCELLANEOUS
Qty: 120 EACH | Refills: 11 | Status: SHIPPED | OUTPATIENT
Start: 2019-01-14 | End: 2020-01-15

## 2019-01-14 NOTE — PROGRESS NOTES
Subjective:      Patient ID: Lavelle Ladd is a 63 y.o. male.    PCP: Rishabh Esteban MD      Lavelle Ladd is a pleasant 63 y.o. male presenting to follow up on diabetes mellitus. He has had diabetes for 8 or more years. His last visit in Diabetes Management was 11/03/2017. Since that time he has struggled with lifestyle modifications as well as medication regime. He admits he does not check his blood glucose, forgets of just does not take his medication as directed, and eats what and when her wants. He has not been a very good patient according to his opinion. He reasons that he doesn't understand why he hast to do all this monitoring and adjustments. He would like to know more about diabetes. He has had his right leg amputated BK, and recently had metatarsal amputation of the left foot.       He denies any hospital admissions, emergency room visits, hypoglycemia, syncope, diaphoresis, chest pain, or dyspnea.    He has maintained 216 pounds since last visit. His BMI is 30.13      His blood sugar in the clinic today was:   Lab Results   Component Value Date    POCGLU 208 (A) 01/14/2019       We discussed the American diabetes Association recommendations:  hemoglobin A1c below 7.0%; all diabetics should be on statins unless contraindicated; one aspirin daily unless contraindicated; fasting blood sugar between 80 and 130 mg/dL; postprandial blood sugar below 180 mg/dl; prevention of hypoglycemia, may adjust goals to higher levels if persistent; ACE or ARB therapy if not contraindicated; and maintain in an ideal body weight with BMI below 25.    Lavelle is noncompliant some of the time with DM medications.     Lavelle is noncompliant some of the time with lifestyle modifications to include activity and meal planning.       STANDARDS OF CARE:  Eye doctor: Dr. AUREA Malhotra, last exam 3/13/2017.  Dental exam: Recommend regular exams; denies gums bleeding.  Podiatry doctor:     ACTIVITY LEVEL: He exercises never.  MEAL  PLANNING: Number of meals per day - 3. Number of snacks per day - 2.  Per dietary recall, patient states he is now limiting carbohydrates, saturated fats and sodium. This is working well for him and he is in the early stages and still learning. But his last 7 days blood sugar log is showing a decline and better glycemic control.  BLOOD GLUCOSE TESTING: Self-monitoring with One touch Verio  SOCIAL HISTORY: single. Lives alone. on permanent disability no tobacco use    The following results were reviewed with patient.    Lab Results   Component Value Date    WBC 4.71 01/07/2019    HGB 12.2 (L) 01/07/2019    HCT 40.5 01/07/2019     01/07/2019    CHOL 149 09/19/2018    TRIG 99 09/19/2018    HDL 29 (L) 09/19/2018    ALT 10 01/07/2019    AST 15 01/07/2019     01/07/2019    K 4.7 01/07/2019     01/07/2019    CREATININE 1.7 (H) 01/07/2019    ESTGFRAFRICA 47.9 (A) 01/07/2019    EGFRNONAA 41.4 (A) 01/07/2019    BUN 22 01/07/2019    CO2 26 01/07/2019    TSH 0.238 (L) 09/19/2018    PSA 0.29 08/25/2015    INR 1.0 10/31/2018     (H) 01/07/2019       Lab Results   Component Value Date    HGBA1C 8.0 (H) 09/19/2018    HGBA1C 10.2 (H) 07/07/2018    HGBA1C 9.9 (H) 06/12/2018       Lab Results   Component Value Date    GLUTAMICACID 0.00 03/20/2017    CPEPTIDE 3.5 03/20/2017     Lab Results   Component Value Date    TSH 0.238 (L) 09/19/2018     Lab Results   Component Value Date    IRON 41 (L) 10/30/2013    TIBC 306 10/30/2013    FERRITIN 132 02/26/2013     Lab Results   Component Value Date    SCBQRQDQ34 192 (L) 02/26/2013     Lab Results   Component Value Date    .0 (H) 10/30/2017    CALCIUM 9.7 01/07/2019    CAION 1.10 05/30/2016    PHOS 2.6 (L) 11/09/2018    PHOS 2.6 (L) 11/09/2018       Review of patient's allergies indicates:   Allergen Reactions    Tylenol [acetaminophen]      Told not to take per Transplant Team       Past Medical History:   Diagnosis Date    DINORAH (acute kidney injury) 9/25/2016     Arthritis     Chronic obstructive pulmonary disease 2016    Coronary artery disease involving native coronary artery of native heart without angina pectoris 2016     donor kidney transplant for DM 16     Induction with Thymo x3 and IV solumedrol to total 875mg  Kidney Biopsy  2016: 16 glomeruli, ACR type 1 AVR type 2, significant microcirculatory changes, c4d negative, No DSA, 5 to10% fibrosis. Treated with thymo x8 2016- no rejection      Diastolic heart failure     Encounter for blood transfusion     ESRD on RRT since 10/2013 10/29/2013    Biopsy proven diabetic nephropathy and lymphoplasmacytic interstitial infiltrate not c/w with AIN (ddx sjogrens or assoc with tamm-horsefall protein extravasation)     GERD (gastroesophageal reflux disease)     History of hepatitis C, s/p successful Rx w/ SVR - 2017    Completed 12 weeks harvoni w/ SVR    Hyperlipidemia     Hypertension     PIC line (peripherally inserted central catheter) flush     Prophylactic immunotherapy     Proteinuria     PVD (peripheral vascular disease) 2017    RLE BKA CT 16 Extensive atherosclerotic disease of the aorta and mesenteric arteries.     Renal hypertension     Type 2 diabetes mellitus with diabetic neuropathy, with long-term current use of insulin 2016    Vitamin B12 deficiency        Review of Systems   Constitutional: Negative.  Negative for activity change, appetite change, chills, diaphoresis, fatigue, fever and unexpected weight change.   HENT: Negative.  Negative for dental problem, facial swelling, hearing loss, nosebleeds, trouble swallowing and voice change.    Eyes: Negative.  Negative for photophobia, pain, discharge, redness, itching and visual disturbance.   Respiratory: Negative.  Negative for cough, choking, chest tightness, shortness of breath and wheezing.    Cardiovascular: Negative.  Negative for chest pain, palpitations and leg swelling.  "  Gastrointestinal: Negative.  Negative for abdominal distention, abdominal pain, blood in stool, constipation, diarrhea, nausea and vomiting.   Endocrine: Negative.  Negative for cold intolerance, heat intolerance, polydipsia, polyphagia and polyuria.   Genitourinary: Negative.  Negative for decreased urine volume, difficulty urinating, dysuria, frequency, scrotal swelling, testicular pain and urgency.   Musculoskeletal: Negative.  Negative for arthralgias, back pain, gait problem, joint swelling, myalgias, neck pain and neck stiffness.   Skin: Negative.  Negative for color change, pallor, rash and wound.   Allergic/Immunologic: Negative.  Negative for environmental allergies, food allergies and immunocompromised state.   Neurological: Negative.  Negative for dizziness, tremors, seizures, syncope, facial asymmetry, speech difficulty, weakness, light-headedness, numbness and headaches.   Hematological: Negative.  Negative for adenopathy. Does not bruise/bleed easily.   Psychiatric/Behavioral: Negative.  Negative for agitation, behavioral problems, confusion, decreased concentration, dysphoric mood, hallucinations, self-injury, sleep disturbance and suicidal ideas. The patient is not nervous/anxious and is not hyperactive.       Objective:     Vitals - 1 value per visit 12/27/2018 1/7/2019 1/14/2019   SYSTOLIC 139 133 134   DIASTOLIC 61 62 84   PULSE 68 68 -   TEMPERATURE - 97.9 -   RESPIRATIONS 16 - -   SPO2 - - -   Weight (lb) - - 218.92   Weight (kg) - - 99.3   HEIGHT 5' 11" - 5' 11"   BODY MASS INDEX 30.23 - 30.53   VISIT REPORT - - -   Pain Score  6 - -   Some recent data might be hidden       Physical Exam   Constitutional: He is oriented to person, place, and time. He appears well-developed and well-nourished.   HENT:   Head: Normocephalic and atraumatic.   Right Ear: External ear normal.   Left Ear: External ear normal.   Nose: Nose normal.   Mouth/Throat: Oropharynx is clear and moist.   Eyes: EOM are " normal. Pupils are equal, round, and reactive to light.   Neck: Normal range of motion. Neck supple. No JVD present. No tracheal deviation present. No thyromegaly present.   Cardiovascular: Normal rate, regular rhythm, normal heart sounds and intact distal pulses. Exam reveals no gallop and no friction rub.   No murmur heard.  Pulmonary/Chest: Effort normal and breath sounds normal. No respiratory distress. He has no wheezes. He has no rales. He exhibits no tenderness.   Abdominal: Soft. Bowel sounds are normal. He exhibits no distension and no mass. There is no tenderness. There is no rebound and no guarding.   Musculoskeletal: Normal range of motion. He exhibits no edema or tenderness.   Lymphadenopathy:     He has no cervical adenopathy.   Neurological: He is alert and oriented to person, place, and time. He has normal reflexes. No cranial nerve deficit. Coordination normal.   Skin: Skin is warm and dry. No rash noted. No erythema. No pallor.   Psychiatric: He has a normal mood and affect.     Assessment:     1. Type 2 diabetes mellitus with hyperglycemia, with long-term current use of insulin       Plan:     Lavelle Ladd is seen today for   1. Type 2 diabetes mellitus with hyperglycemia, with long-term current use of insulin      We have discussed the etiology and treatment options associated with the diagnosis as well as alternatives. He has elected the following treatments.      - Continue with D&E, reduce portion size, and restrict carbohydrates (no more that 45 grams ) per meal.   - Needs improvement will intensify insulin therapy as outlined below.   - CGM with follow-up in 2 weeks.        Type 2 diabetes mellitus with hyperglycemia, with long-term current use of insulin  -     POCT Glucose, Hand-Held Device  -     Hemoglobin A1c; Future; Expected date: 01/14/2019  -     Renal function panel; Future; Expected date: 01/14/2019  -     ALT (SGPT); Future; Expected date: 01/14/2019  -     AST (SGOT); Future;  "Expected date: 01/14/2019  -     Lipid panel; Future; Expected date: 01/14/2019  -     TSH; Future; Expected date: 01/14/2019  -     Protein / creatinine ratio, urine; Future  -     GLUCOSE MONITORING CONTINUOUS MIN 72 HOURS; Future    Other orders  -     liraglutide 0.6 mg/0.1 mL, 18 mg/3 mL, subq PNIJ (VICTOZA 2-KOKI) 0.6 mg/0.1 mL (18 mg/3 mL) PnIj; Inject 0.6 mg into the skin once daily.  Dispense: 3 mL; Refill: 11  -     insulin degludec (TRESIBA FLEXTOUCH U-100) 100 unit/mL (3 mL) InPn; Inject 30 Units into the skin every evening.  Dispense: 15 mL; Refill: 11  -     pen needle, diabetic (SURE-FINE PEN NEEDLES) 31 gauge x 3/16" Ndle; 1 each by Misc.(Non-Drug; Combo Route) route 4 (four) times daily with meals and nightly.  Dispense: 120 each; Refill: 11    1.) Patient was instructed to monitor blood glucose twice daily, fasting, and 2 hour post meal; if on Multiple Daily Injections (MDI) he will need to have pre-meal blood glucose as well. Reminded to bring BG meter or record to each visit for review.  2.) Reviewed pathophysiology of diabetes, complications related to the disease, importance of annual dilated eye exam and self daily foot examination.  3.) Continue medications as prescribed Novolin R and [facility] Phone review in 1 week with [facility] records for adjustment of medication.  4.) Advised patient to continue to follow up with Dietician/CDE as directed.  5.) Discussed activity, benefits, methods, and precautions. Recommended patient start/continue some form of exercise and increase as tolerated to 60 minutes per day to facilitate weight loss and aid in control of BGs. Also reminded patient of WHO recommendation of 10,000 steps daily as a goal.   6.) A1C, TSH, Lipid Panel, CMP/renal panel with eGFR and Micro/Creatinine.  7.) Return to clinic in 3 months for follow up. Advised patient to call clinic with any questions or concerns.    A total of 40 minutes was spent in face to face time, of " which 50 % was spent in counseling patient on disease process, complications, treatment, and side effects of medications.    The patient was explained the above plan and given opportunity to ask questions.  He understands, chooses and consents to this plan and accepts all the risks, which include but are not limited to the risks mentioned above.   He understands the alternative of having no testing, interventions or treatments at this time. He left content and without further questions.

## 2019-01-15 ENCOUNTER — TELEPHONE (OUTPATIENT)
Dept: PODIATRY | Facility: CLINIC | Age: 64
End: 2019-01-15

## 2019-01-16 ENCOUNTER — TELEPHONE (OUTPATIENT)
Dept: PODIATRY | Facility: CLINIC | Age: 64
End: 2019-01-16

## 2019-01-16 ENCOUNTER — CLINICAL SUPPORT (OUTPATIENT)
Dept: DIABETES | Facility: CLINIC | Age: 64
End: 2019-01-16
Payer: MEDICARE

## 2019-01-16 DIAGNOSIS — Z79.4 TYPE 2 DIABETES MELLITUS WITH HYPERGLYCEMIA, WITH LONG-TERM CURRENT USE OF INSULIN: ICD-10-CM

## 2019-01-16 DIAGNOSIS — E11.65 TYPE 2 DIABETES MELLITUS WITH HYPERGLYCEMIA, WITH LONG-TERM CURRENT USE OF INSULIN: ICD-10-CM

## 2019-01-16 NOTE — PROGRESS NOTES
"Patient was in clinic on 1/14/19 for placement of continuous glucose monitoring system. Patient was provided a Freestyle Fabrice sensor and a copy of the Continuous Glucose Monitoring Patient Log to to fill out during the study.      A detailed explanation of CGMS was provided. Patient informed that this is a blind procedure and they will not actually see the blood sugar tracing in real time. Reviewed with the patient the Freestyle Fabrice Pro Sensor education handout, "What you need to know"  to review self-care during the study to avoid sensor loosening or removal ie...bathing, swimming, dressing, and exercising.      Instructed patient to check blood sugar using home glucometer and to record the following on provided patient log sheets: blood sugar taken at home, meals and snacks, activity, and diabetes medications taken and dosage        "

## 2019-01-16 NOTE — TELEPHONE ENCOUNTER
Patient refused to call pharmacy and insisted I give them his phone number.  I agreed and the call ended pleasantly.  I called the number for the [facility] and relayed patient phone number.  Pharmacy stated it was on file and they would try calling again.  Call ended pleasantly.

## 2019-01-23 ENCOUNTER — OFFICE VISIT (OUTPATIENT)
Dept: PODIATRY | Facility: CLINIC | Age: 64
End: 2019-01-23
Payer: MEDICARE

## 2019-01-23 ENCOUNTER — TELEPHONE (OUTPATIENT)
Dept: PODIATRY | Facility: CLINIC | Age: 64
End: 2019-01-23

## 2019-01-23 VITALS
HEART RATE: 86 BPM | DIASTOLIC BLOOD PRESSURE: 100 MMHG | BODY MASS INDEX: 30.65 KG/M2 | SYSTOLIC BLOOD PRESSURE: 164 MMHG | WEIGHT: 218.94 LBS | HEIGHT: 71 IN

## 2019-01-23 DIAGNOSIS — E11.621 DIABETIC ULCER OF LEFT MIDFOOT ASSOCIATED WITH TYPE 2 DIABETES MELLITUS, WITH FAT LAYER EXPOSED: Primary | ICD-10-CM

## 2019-01-23 DIAGNOSIS — Z89.432 S/P TRANSMETATARSAL AMPUTATION OF FOOT, LEFT: ICD-10-CM

## 2019-01-23 DIAGNOSIS — I73.9 PVD (PERIPHERAL VASCULAR DISEASE): ICD-10-CM

## 2019-01-23 DIAGNOSIS — Z89.511 HX OF RIGHT BKA: ICD-10-CM

## 2019-01-23 DIAGNOSIS — L97.422 DIABETIC ULCER OF LEFT MIDFOOT ASSOCIATED WITH TYPE 2 DIABETES MELLITUS, WITH FAT LAYER EXPOSED: Primary | ICD-10-CM

## 2019-01-23 DIAGNOSIS — E11.42 TYPE 2 DIABETES MELLITUS WITH PERIPHERAL NEUROPATHY: ICD-10-CM

## 2019-01-23 PROCEDURE — 99024 POSTOP FOLLOW-UP VISIT: CPT | Mod: HCNC,S$GLB,, | Performed by: PODIATRIST

## 2019-01-23 PROCEDURE — 99024 PR POST-OP FOLLOW-UP VISIT: ICD-10-PCS | Mod: HCNC,S$GLB,, | Performed by: PODIATRIST

## 2019-01-23 PROCEDURE — 99999 PR PBB SHADOW E&M-EST. PATIENT-LVL IV: CPT | Mod: PBBFAC,HCNC,, | Performed by: PODIATRIST

## 2019-01-23 PROCEDURE — 99999 PR PBB SHADOW E&M-EST. PATIENT-LVL IV: ICD-10-PCS | Mod: PBBFAC,HCNC,, | Performed by: PODIATRIST

## 2019-01-23 NOTE — TELEPHONE ENCOUNTER
Contacted Home Health nurse and gave verbal wound care orders as well as faxing over orders.       1. Cleanse wound on operative  Foot with saline  2. Apply santyl wound gel to site  3. Cover with hydrofera blue  4. Pad with gauzes  5. Secure with kerlix and ace bandage wrap    Change dressing now THREE times weekly, Mon, Wed, Friday.

## 2019-01-23 NOTE — PROGRESS NOTES
PODIATRIC MEDICINE AND SURGERY      CC:   Chief Complaint   Patient presents with    Wound Care     Lt foot pain rates 5/10       SUBJECTIVE   Lavelle Ladd is a 63 y.o. male status post left revisional transmetatarsal amputation and secondary wound closure final closure November 5, 2018. Pt is approximately 2 weeks post operatively.  Patient has history of poor compliance and relates that he left against medical advice from nursing facility as he was not pleased with standard of care.  He has been changing his dressing at home without any physician or nurse orders.  He presents my clinic today for follow-up evaluation.  Denies any nausea vomiting fever or pain to the surgical site.  He states he has tried to be compliant with nonweightbearing to left lower extremity.  He is being currently managed with p.o. Antibiotics- ZYVOX (6 weeks duration) by Dr. Veliz with Infectious Disease as he did have positive cultures for osteomyelitis on amputation resection sites. He denies any fever. States he has changed dressing on his own every 3 days. He has tried to be compliant with NWB with wheel chair.  Pt requests PICC line removal. States PICC line is bothering him and he is no longer having IV and requests that I remove the PICC line.    12/19/18  Pt is here for 3 week follow up post operatively. He is currently taking oral Zyvox for osteomyelitis. No complaints. He has remained NWB with wheelchair at home. Denies N/V/F/C. Wound cultures recently taken last week Negative for bacteria. Home health dressing changes are being performed three times weekly. Wound cultures in the past Enterobacter.  is monitoring patient for antibiotic management.     Pt is also here for mobility evaluation. He is 5'11, 216 lbs. He currently uses a wheel chair at home which is difficult to utilize causing strain on his arms. He would like to receive a power wheelchair. He is at risk for limb loss. Still undergoing treatment for  foot amputation. He is not able to safely ambulate without assistance until the wound heals. He is on strict non weight bearing protocol to prevent loss of his leg. Pt is willing and able to perform bating, toileting, grooming and eating with a Power chair. The medical conditions limiting ambulation is an amputation of his left foot and amputation of RIGHT leg. He is a double amputee and would benefit from power wheel chair. He is unable to use a walker or a scooter  as he is unable to WALK on left foot due to recent amputation. He is unable to use a manual chair as he is a double amputee.       1/23/2019  Pt is here for wound care follow up. Wound has progressed with signs of healing. He is fully weight bearing in CAM boot. Home health twice weekly MARIA LUISA application. No further pedal complaints.     Hemoglobin A1C   Date Value Ref Range Status   09/19/2018 8.0 (H) 4.0 - 5.6 % Final     Comment:     ADA Screening Guidelines:  5.7-6.4%  Consistent with prediabetes  >or=6.5%  Consistent with diabetes  High levels of fetal hemoglobin interfere with the HbA1C  assay. Heterozygous hemoglobin variants (HbS, HgC, etc)do  not significantly interfere with this assay.   However, presence of multiple variants may affect accuracy.     07/07/2018 10.2 (H) 4.0 - 5.6 % Final     Comment:     ADA Screening Guidelines:  5.7-6.4%  Consistent with prediabetes  >or=6.5%  Consistent with diabetes  High levels of fetal hemoglobin interfere with the HbA1C  assay. Heterozygous hemoglobin variants (HbS, HgC, etc)do  not significantly interfere with this assay.   However, presence of multiple variants may affect accuracy.     06/12/2018 9.9 (H) 4.0 - 5.6 % Final     Comment:     ADA Screening Guidelines:  5.7-6.4%  Consistent with prediabetes  >or=6.5%  Consistent with diabetes  High levels of fetal hemoglobin interfere with the HbA1C  assay. Heterozygous hemoglobin variants (HbS, HgC, etc)do  not significantly interfere with this assay.  "  However, presence of multiple variants may affect accuracy.         OBJECTIVE   Vitals:    01/23/19 0948   BP: (!) 164/100   Pulse: 86   Weight: 99.3 kg (218 lb 14.7 oz)   Height: 5' 11" (1.803 m)     LOWER EXTREMITY PHYSICAL EXAM  · Neurovascular status - vascular status grossly intact.    There is small partial opening anterior medial aspect of stump measuring appx 0.5 cm x 0.3 cm, eschar cap overlying, post debridement reveals granular wound bed.       Radiographs reviewed:   -  post op.    Pathology:  FINAL PATHOLOGIC DIAGNOSIS  Metatarsal bone, 1-5:  Acute osteomyelitis.  Reactive bone and cartilage.      ASSESSMENT  Encounter Diagnoses   Name Primary?    Diabetic ulcer of left midfoot associated with type 2 diabetes mellitus, with fat layer exposed Yes    S/P transmetatarsal amputation of foot, left     PVD (peripheral vascular disease)     Type 2 diabetes mellitus with peripheral neuropathy     Hx of right BKA          PLAN  Wound almost completely healed. Ok to full WB. Consider santyl vs continued MARIA LUISA  -Goal of treatment: Prevention of infection and wound healing  -The wound is cleansed, debrided of foreign material as much as possible, and dressed.  -With patient's permission, the open wound on amputation stump sites were sharply excisionally debrided of viable and nonviable tissue down to good bleeding granular tissue base at level of subcutaneous tissue utilizing sterile #15 blade and tissue nipper and forceps. Wound was irrigated with sterile saline and bleeding was controlled with direct pressure. Minimal blood loss. The patient tolerated this well. Wound was then dressed with wound gel. The patient is alerted to watch for any signs of infection (redness, pus, pain, increased swelling or fever) and call if such occurs. Home wound care instructions are provided.    Home health wound dressing changes:    1. Cleanse wound on operative Foot with saline   2. Apply santyl wound gel to site   3. " Cover with hydrofera blue   4. Pad with gauzes   5. Secure with kerlix and ace bandage wrap     Change dressing now THREE times weekly, Mon, Wed, Friday.       The patient is alerted to watch for any signs of worsening of infection (redness, pus, pain, increased swelling or fever) and call if such occurs or report to Emergency room if after hours. At risk for loss of limb.     Disclaimer:  This note may have been prepared using voice recognition software, it may have not been extensively proofed, as such there could be errors within the text such as sound alike errors.       Future Appointments   Date Time Provider Department Center   2/6/2019 12:30 PM Amira Lynn Jr., [facility] [place]    2/7/2019  8:00 AM Karla Wheeler DPM [facility]   4/1/2019  1:00 PM Ronny Veliz MD [facility] None       Report Electronically Signed By:     Karla Wheeler DPM   Podiatric Medicine & Surgery  [facility]  1/23/2019

## 2019-01-24 DIAGNOSIS — Z94.0 KIDNEY REPLACED BY TRANSPLANT: Primary | ICD-10-CM

## 2019-01-24 RX ORDER — PREDNISONE 5 MG/1
5 TABLET ORAL DAILY
Qty: 30 TABLET | Refills: 5 | Status: SHIPPED | OUTPATIENT
Start: 2019-01-24 | End: 2019-08-27 | Stop reason: SDUPTHER

## 2019-01-24 NOTE — TELEPHONE ENCOUNTER
----- Message from Justa Silva sent at 1/24/2019 12:53 PM CST -----  Contact: [phone]  Pt is calling to request refill on predniSONE (DELTASONE) 5 MG tablet      Edmundo Hernandez phone [phone]    Thank you!  
<<----- Click to add NO pertinent Family History

## 2019-01-28 ENCOUNTER — PATIENT OUTREACH (OUTPATIENT)
Dept: ADMINISTRATIVE | Facility: HOSPITAL | Age: 64
End: 2019-01-28

## 2019-02-05 ENCOUNTER — LAB VISIT (OUTPATIENT)
Dept: LAB | Facility: HOSPITAL | Age: 64
End: 2019-02-05
Payer: MEDICARE

## 2019-02-05 DIAGNOSIS — Z79.4 TYPE 2 DIABETES MELLITUS WITH HYPERGLYCEMIA, WITH LONG-TERM CURRENT USE OF INSULIN: ICD-10-CM

## 2019-02-05 DIAGNOSIS — E11.65 TYPE 2 DIABETES MELLITUS WITH HYPERGLYCEMIA, WITH LONG-TERM CURRENT USE OF INSULIN: ICD-10-CM

## 2019-02-05 LAB
ALBUMIN SERPL BCP-MCNC: 3.6 G/DL
ALT SERPL W/O P-5'-P-CCNC: 13 U/L
ANION GAP SERPL CALC-SCNC: 9 MMOL/L
AST SERPL-CCNC: 16 U/L
BUN SERPL-MCNC: 24 MG/DL
CALCIUM SERPL-MCNC: 9.6 MG/DL
CHLORIDE SERPL-SCNC: 104 MMOL/L
CHOLEST SERPL-MCNC: 207 MG/DL
CHOLEST/HDLC SERPL: 3.3 {RATIO}
CO2 SERPL-SCNC: 27 MMOL/L
CREAT SERPL-MCNC: 1.6 MG/DL
EST. GFR  (AFRICAN AMERICAN): 51.5 ML/MIN/1.73 M^2
EST. GFR  (NON AFRICAN AMERICAN): 44.5 ML/MIN/1.73 M^2
ESTIMATED AVG GLUCOSE: 183 MG/DL
GLUCOSE SERPL-MCNC: 126 MG/DL
HBA1C MFR BLD HPLC: 8 %
HDLC SERPL-MCNC: 63 MG/DL
HDLC SERPL: 30.4 %
LDLC SERPL CALC-MCNC: 125.6 MG/DL
NONHDLC SERPL-MCNC: 144 MG/DL
PHOSPHATE SERPL-MCNC: 3.3 MG/DL
POTASSIUM SERPL-SCNC: 4.3 MMOL/L
SODIUM SERPL-SCNC: 140 MMOL/L
TRIGL SERPL-MCNC: 92 MG/DL
TSH SERPL DL<=0.005 MIU/L-ACNC: 1.7 UIU/ML

## 2019-02-05 PROCEDURE — 84443 ASSAY THYROID STIM HORMONE: CPT | Mod: HCNC

## 2019-02-05 PROCEDURE — 36415 COLL VENOUS BLD VENIPUNCTURE: CPT | Mod: HCNC

## 2019-02-05 PROCEDURE — 83036 HEMOGLOBIN GLYCOSYLATED A1C: CPT | Mod: HCNC

## 2019-02-05 PROCEDURE — 84460 ALANINE AMINO (ALT) (SGPT): CPT | Mod: HCNC

## 2019-02-05 PROCEDURE — 80061 LIPID PANEL: CPT | Mod: HCNC

## 2019-02-05 PROCEDURE — 80069 RENAL FUNCTION PANEL: CPT | Mod: HCNC

## 2019-02-05 PROCEDURE — 84450 TRANSFERASE (AST) (SGOT): CPT | Mod: HCNC

## 2019-02-06 ENCOUNTER — OFFICE VISIT (OUTPATIENT)
Dept: DIABETES | Facility: CLINIC | Age: 64
End: 2019-02-06
Payer: MEDICARE

## 2019-02-06 ENCOUNTER — CLINICAL SUPPORT (OUTPATIENT)
Dept: DIABETES | Facility: CLINIC | Age: 64
End: 2019-02-06
Payer: MEDICARE

## 2019-02-06 VITALS
BODY MASS INDEX: 30.83 KG/M2 | DIASTOLIC BLOOD PRESSURE: 82 MMHG | SYSTOLIC BLOOD PRESSURE: 138 MMHG | WEIGHT: 220.25 LBS | HEIGHT: 71 IN

## 2019-02-06 DIAGNOSIS — Z79.4 TYPE 2 DIABETES MELLITUS WITH HYPERGLYCEMIA, WITH LONG-TERM CURRENT USE OF INSULIN: Primary | ICD-10-CM

## 2019-02-06 DIAGNOSIS — E11.65 TYPE 2 DIABETES MELLITUS WITH HYPERGLYCEMIA, WITH LONG-TERM CURRENT USE OF INSULIN: Primary | ICD-10-CM

## 2019-02-06 PROCEDURE — 82962 GLUCOSE BLOOD TEST: CPT | Mod: HCNC,S$GLB,, | Performed by: PHYSICIAN ASSISTANT

## 2019-02-06 PROCEDURE — 3008F PR BODY MASS INDEX (BMI) DOCUMENTED: ICD-10-PCS | Mod: HCNC,CPTII,S$GLB, | Performed by: PHYSICIAN ASSISTANT

## 2019-02-06 PROCEDURE — 3079F PR MOST RECENT DIASTOLIC BLOOD PRESSURE 80-89 MM HG: ICD-10-PCS | Mod: HCNC,CPTII,S$GLB, | Performed by: PHYSICIAN ASSISTANT

## 2019-02-06 PROCEDURE — 3045F PR MOST RECENT HEMOGLOBIN A1C LEVEL 7.0-9.0%: CPT | Mod: HCNC,CPTII,S$GLB, | Performed by: PHYSICIAN ASSISTANT

## 2019-02-06 PROCEDURE — 1101F PR PT FALLS ASSESS DOC 0-1 FALLS W/OUT INJ PAST YR: ICD-10-PCS | Mod: HCNC,CPTII,S$GLB, | Performed by: PHYSICIAN ASSISTANT

## 2019-02-06 PROCEDURE — 3075F SYST BP GE 130 - 139MM HG: CPT | Mod: HCNC,CPTII,S$GLB, | Performed by: PHYSICIAN ASSISTANT

## 2019-02-06 PROCEDURE — 95251 CONT GLUC MNTR ANALYSIS I&R: CPT | Mod: HCNC,S$GLB,, | Performed by: PHYSICIAN ASSISTANT

## 2019-02-06 PROCEDURE — 1101F PT FALLS ASSESS-DOCD LE1/YR: CPT | Mod: HCNC,CPTII,S$GLB, | Performed by: PHYSICIAN ASSISTANT

## 2019-02-06 PROCEDURE — 99213 PR OFFICE/OUTPT VISIT, EST, LEVL III, 20-29 MIN: ICD-10-PCS | Mod: HCNC,S$GLB,, | Performed by: PHYSICIAN ASSISTANT

## 2019-02-06 PROCEDURE — 95251 PR GLUCOSE MONITOR, 72 HOUR, PHYS INTERP: ICD-10-PCS | Mod: HCNC,S$GLB,, | Performed by: PHYSICIAN ASSISTANT

## 2019-02-06 PROCEDURE — 82962 POCT GLUCOSE, HAND-HELD DEVICE: ICD-10-PCS | Mod: HCNC,S$GLB,, | Performed by: PHYSICIAN ASSISTANT

## 2019-02-06 PROCEDURE — 3075F PR MOST RECENT SYSTOLIC BLOOD PRESS GE 130-139MM HG: ICD-10-PCS | Mod: HCNC,CPTII,S$GLB, | Performed by: PHYSICIAN ASSISTANT

## 2019-02-06 PROCEDURE — 3045F PR MOST RECENT HEMOGLOBIN A1C LEVEL 7.0-9.0%: ICD-10-PCS | Mod: HCNC,CPTII,S$GLB, | Performed by: PHYSICIAN ASSISTANT

## 2019-02-06 PROCEDURE — 99999 PR PBB SHADOW E&M-EST. PATIENT-LVL IV: ICD-10-PCS | Mod: PBBFAC,HCNC,, | Performed by: PHYSICIAN ASSISTANT

## 2019-02-06 PROCEDURE — 99999 PR PBB SHADOW E&M-EST. PATIENT-LVL IV: CPT | Mod: PBBFAC,HCNC,, | Performed by: PHYSICIAN ASSISTANT

## 2019-02-06 PROCEDURE — 3008F BODY MASS INDEX DOCD: CPT | Mod: HCNC,CPTII,S$GLB, | Performed by: PHYSICIAN ASSISTANT

## 2019-02-06 PROCEDURE — 99213 OFFICE O/P EST LOW 20 MIN: CPT | Mod: HCNC,S$GLB,, | Performed by: PHYSICIAN ASSISTANT

## 2019-02-06 PROCEDURE — 3079F DIAST BP 80-89 MM HG: CPT | Mod: HCNC,CPTII,S$GLB, | Performed by: PHYSICIAN ASSISTANT

## 2019-02-06 NOTE — TELEPHONE ENCOUNTER
----- Message from Eusebia Solis sent at 2/6/2019  2:09 PM CST -----  Contact: pt  1. What is the name of the medication you are requesting? Glucose monitor system  2. What is the dose?    3. How do you take the medication? Orally, topically, etc? n/a   4. How often do you take this medication?  n/a  5. Do you need a 30 day or 90 day supply?  n/a  6. How many refills are you requesting? n/a  7. What is your preferred pharmacy and location of the pharmacy? [id number] [place] - [address] [address]  [address]  [place] [address]  Phone: [phone] Fax: [phone]    8. Who can we contact with further questions? [phone] (home)  Pt states prescription was sent to wrong pharmacy

## 2019-02-06 NOTE — PROGRESS NOTES
Continuous glucose monitoring report:     The patient's CGM was downloaded and was reviewed.  The report was technically satisfactory and there were 15 days of data reviewed. Target range was  mg/dl Hypoglycemia was below 70 and hyperglycemic was above 180 mg/dl. Patient's daily glucose summary showed a range of hypoglycemic from 0% to 0% with 0 of 15 days above 10%. His daily above target range was from 27% to 100% 0 of 15 days above 25%.  There was not a food intake diary or exercise diary submitted with this report.     Statistics:  Patient's average glucose was 220 mg/dL, Sensor usage was 15 of 15 days.  He was in time above range (TAR) 68% of the time, time in range(TIR) 32% of the time, and time below range (TBR) 0% of the time.  The target range for this patient was  mg/dL, and for the purpose of overnight this would be interpreted as 10 PM to 6 AM.     Pattern insight summary:  Nighttime lows, 0 were found     Daytime lows, 0 were found     Nighttime highs 12 was found and the most significant pattern found them between 10 PM and 6 AM.     Daytime highs, 15 were found and most significant pattern showed them to be between 6 AM- 10 PM.      Interpretation:  #1 His hypoglycemia pattern was suspected to secondary to N/A.  #2 His daytime highs pattern was suspected to secondary to erratic diet, over eating and missing medications.  #3 Recommendations were to Be more adherent to his medication regime. Basal (Tresiba) and GLP-1 (Victoza)  #4 Will have his follow up in 3 months.    A total of 30 minutes was spent in face to face time, of which 50 % was spent in counseling patient on disease process, complications, treatment, and side effects of medications.    The patient was explained the above plan and given opportunity to ask questions. He understands, chooses and consents to this plan and accepts all the risks, which include but are not limited to the risks mentioned above. He understands the  alternative of having no testing, interventions or treatments at this time. He left content and without further questions.       JONATHAN SmithC

## 2019-02-06 NOTE — PROGRESS NOTES
Patient returned to clinic today to return Glucose Sensor.      The CGMS Sensor was scanned and downloaded. All reports were imported into the patient's electronic medical record. Physician Assistant will complete data interpretation and make recommendations at visit.

## 2019-02-07 ENCOUNTER — TELEPHONE (OUTPATIENT)
Dept: PODIATRY | Facility: CLINIC | Age: 64
End: 2019-02-07

## 2019-02-07 ENCOUNTER — OFFICE VISIT (OUTPATIENT)
Dept: PODIATRY | Facility: CLINIC | Age: 64
End: 2019-02-07
Payer: MEDICARE

## 2019-02-07 VITALS
WEIGHT: 220 LBS | SYSTOLIC BLOOD PRESSURE: 154 MMHG | DIASTOLIC BLOOD PRESSURE: 97 MMHG | HEART RATE: 88 BPM | BODY MASS INDEX: 30.8 KG/M2 | HEIGHT: 71 IN

## 2019-02-07 DIAGNOSIS — I73.9 PVD (PERIPHERAL VASCULAR DISEASE): ICD-10-CM

## 2019-02-07 DIAGNOSIS — E11.621 DIABETIC ULCER OF LEFT MIDFOOT ASSOCIATED WITH TYPE 2 DIABETES MELLITUS, WITH FAT LAYER EXPOSED: Primary | ICD-10-CM

## 2019-02-07 DIAGNOSIS — M54.16 CHRONIC LUMBAR RADICULOPATHY: ICD-10-CM

## 2019-02-07 DIAGNOSIS — M48.062 LUMBAR STENOSIS WITH NEUROGENIC CLAUDICATION: ICD-10-CM

## 2019-02-07 DIAGNOSIS — L97.422 DIABETIC ULCER OF LEFT MIDFOOT ASSOCIATED WITH TYPE 2 DIABETES MELLITUS, WITH FAT LAYER EXPOSED: Primary | ICD-10-CM

## 2019-02-07 DIAGNOSIS — Z89.432 S/P TRANSMETATARSAL AMPUTATION OF FOOT, LEFT: ICD-10-CM

## 2019-02-07 PROCEDURE — 11042 DBRDMT SUBQ TIS 1ST 20SQCM/<: CPT | Mod: S$GLB,,, | Performed by: PODIATRIST

## 2019-02-07 PROCEDURE — 99999 PR PBB SHADOW E&M-EST. PATIENT-LVL V: ICD-10-PCS | Mod: PBBFAC,HCNC,, | Performed by: PODIATRIST

## 2019-02-07 PROCEDURE — 99499 NO LOS: ICD-10-PCS | Mod: S$GLB,,, | Performed by: PODIATRIST

## 2019-02-07 PROCEDURE — 99999 PR PBB SHADOW E&M-EST. PATIENT-LVL V: CPT | Mod: PBBFAC,HCNC,, | Performed by: PODIATRIST

## 2019-02-07 PROCEDURE — 99499 UNLISTED E&M SERVICE: CPT | Mod: S$GLB,,, | Performed by: PODIATRIST

## 2019-02-07 PROCEDURE — 11042 PR DEBRIDEMENT, SKIN, SUB-Q TISSUE,=<20 SQ CM: ICD-10-PCS | Mod: S$GLB,,, | Performed by: PODIATRIST

## 2019-02-07 NOTE — PROGRESS NOTES
PODIATRIC MEDICINE AND SURGERY      CC:   Chief Complaint   Patient presents with    Wound Care     left foot wound       SUBJECTIVE   Lavelle Ladd is a 63 y.o. male status post left revisional transmetatarsal amputation and secondary wound closure final closure November 5, 2018. Pt is approximately 2 weeks post operatively.  Patient has history of poor compliance and relates that he left against medical advice from nursing facility as he was not pleased with standard of care.  He has been changing his dressing at home without any physician or nurse orders.  He presents my clinic today for follow-up evaluation.  Denies any nausea vomiting fever or pain to the surgical site.  He states he has tried to be compliant with nonweightbearing to left lower extremity.  He is being currently managed with p.o. Antibiotics- ZYVOX (6 weeks duration) by Dr. Veliz with Infectious Disease as he did have positive cultures for osteomyelitis on amputation resection sites. He denies any fever. States he has changed dressing on his own every 3 days. He has tried to be compliant with NWB with wheel chair.  Pt requests PICC line removal. States PICC line is bothering him and he is no longer having IV and requests that I remove the PICC line.    12/19/18  Pt is here for 3 week follow up post operatively. He is currently taking oral Zyvox for osteomyelitis. No complaints. He has remained NWB with wheelchair at home. Denies N/V/F/C. Wound cultures recently taken last week Negative for bacteria. Home health dressing changes are being performed three times weekly. Wound cultures in the past Enterobacter.  is monitoring patient for antibiotic management.     Pt is also here for mobility evaluation. He is 5'11, 216 lbs. He currently uses a wheel chair at home which is difficult to utilize causing strain on his arms. He would like to receive a power wheelchair. He is at risk for limb loss. Still undergoing treatment for foot  amputation. He is not able to safely ambulate without assistance until the wound heals. He is on strict non weight bearing protocol to prevent loss of his leg. Pt is willing and able to perform bating, toileting, grooming and eating with a Power chair. The medical conditions limiting ambulation is an amputation of his left foot and amputation of RIGHT leg. He is a double amputee and would benefit from power wheel chair. He is unable to use a walker or a scooter  as he is unable to WALK on left foot due to recent amputation. He is unable to use a manual chair as he is a double amputee.       2/7/2019  Pt is here for wound care follow up. Wound has progressed with signs of healing. He is fully weight bearing in CAM boot. Home health twice weekly MARIA LUISA application. He has been fitted for DM shoes and toe filler (Red Stick).  No further pedal complaints.   Patient complains about chronic back pain.  He is seen a pain management specialist at outside facility, [facility].  He states he has had full workup for his lower back problems and has been informed that he will need surgery.  He would like to get a referral to a neurosurgeon here at [facility].    Hemoglobin A1C   Date Value Ref Range Status   02/05/2019 8.0 (H) 4.0 - 5.6 % Final     Comment:     ADA Screening Guidelines:  5.7-6.4%  Consistent with prediabetes  >or=6.5%  Consistent with diabetes  High levels of fetal hemoglobin interfere with the HbA1C  assay. Heterozygous hemoglobin variants (HbS, HgC, etc)do  not significantly interfere with this assay.   However, presence of multiple variants may affect accuracy.     09/19/2018 8.0 (H) 4.0 - 5.6 % Final     Comment:     ADA Screening Guidelines:  5.7-6.4%  Consistent with prediabetes  >or=6.5%  Consistent with diabetes  High levels of fetal hemoglobin interfere with the HbA1C  assay. Heterozygous hemoglobin variants (HbS, HgC, etc)do  not significantly interfere with this assay.   However, presence  "of multiple variants may affect accuracy.     07/07/2018 10.2 (H) 4.0 - 5.6 % Final     Comment:     ADA Screening Guidelines:  5.7-6.4%  Consistent with prediabetes  >or=6.5%  Consistent with diabetes  High levels of fetal hemoglobin interfere with the HbA1C  assay. Heterozygous hemoglobin variants (HbS, HgC, etc)do  not significantly interfere with this assay.   However, presence of multiple variants may affect accuracy.         OBJECTIVE   Vitals:    02/07/19 0751   BP: (!) 154/97   Pulse: 88   Weight: 99.8 kg (220 lb)   Height: 5' 11" (1.803 m)   PainSc:   3     LOWER EXTREMITY PHYSICAL EXAM  · Neurovascular status - vascular status grossly intact.    There is small partial opening anterior medial aspect of stump measuring appx 1.0 cm x 0.3 cm,  post debridement reveals granular wound bed.    no acute SOI noted    Radiographs reviewed:   -  post op.    Pathology:  FINAL PATHOLOGIC DIAGNOSIS  Metatarsal bone, 1-5:  Acute osteomyelitis.  Reactive bone and cartilage.      ASSESSMENT  Encounter Diagnoses   Name Primary?    Chronic lumbar radiculopathy     Lumbar stenosis with neurogenic claudication     PVD (peripheral vascular disease)     Diabetic ulcer of left midfoot associated with type 2 diabetes mellitus, with fat layer exposed Yes    S/P transmetatarsal amputation of foot, left          PLAN  Wound almost completely healed. Ok to full WB. Consider santyl vs continued MARIA LUISA. Pt states he is unable to afford SANTYL. Will continue with MARIA LUISA Q2 dressing changes    Home health wound dressing changes:     1. Cleanse wound on operative Foot with saline   2. Apply santyl wound gel to site   3. Cover with hydrofera blue   4. Pad with gauzes   5. Secure with kerlix and ace bandage wrap     Change dressing now THREE times weekly, Mon, Wed, Friday.     -Goal of treatment: Prevention of infection and wound healing  -The wound is cleansed, debrided of foreign material as much as possible, and dressed.  -With " patient's permission, the open wound on amputation stump sites were sharply excisionally debrided of viable and nonviable tissue down to good bleeding granular tissue base at level of subcutaneous tissue utilizing sterile #15 blade and tissue nipper and forceps. Wound was irrigated with sterile saline and bleeding was controlled with direct pressure. Minimal blood loss. The patient tolerated this well. Wound was then dressed with wound gel. The patient is alerted to watch for any signs of infection (redness, pus, pain, increased swelling or fever) and call if such occurs. Home wound care instructions are provided.    Referral to Dr. Singh neurosurgeon    The patient is alerted to watch for any signs of worsening of infection (redness, pus, pain, increased swelling or fever) and call if such occurs or report to Emergency room if after hours. At risk for loss of limb.     Disclaimer:  This note may have been prepared using voice recognition software, it may have not been extensively proofed, as such there could be errors within the text such as sound alike errors.       Future Appointments   Date Time Provider Department Center   2/21/2019  9:20 AM Karla Wheeler DPM [facility] [place]   4/1/2019  1:00 PM Ronny Veliz MD [facility] None   5/15/2019 11:00 AM Amira Lynn Jr., PA-C [facility] [place]       Report Electronically Signed By:     Karla Wheeler DPM   Podiatric Medicine & Surgery  [facility]  2/7/2019

## 2019-02-07 NOTE — TELEPHONE ENCOUNTER
Contacted Indira at Home Health and gave her updated wound care orders which are as follows:    1. Cleanse wound on operative Foot with saline   2. Apply and cut MARIA LUISA to wound site and apply saline to moisten the MARIA LUISA   3. Cover with hydrofera blue   4. Pad with gauzes   5. Secure with kerlix and ace bandage wrap     Change dressing three  times weekly, Mon, Wed, Friday.

## 2019-02-08 ENCOUNTER — TELEPHONE (OUTPATIENT)
Dept: ADMINISTRATIVE | Facility: CLINIC | Age: 64
End: 2019-02-08

## 2019-02-08 RX ORDER — MYCOPHENOLATE MOFETIL 500 MG/1
500 TABLET ORAL 2 TIMES DAILY
Qty: 120 TABLET | Refills: 11 | Status: SHIPPED | OUTPATIENT
Start: 2019-02-08 | End: 2019-07-08 | Stop reason: SDUPTHER

## 2019-02-08 NOTE — TELEPHONE ENCOUNTER
----- Message from Levi Ness sent at 2/8/2019 11:27 AM CST -----  Contact: Pt  Rx Refill/Request     Is this a Refill or New Rx: Refill    Rx Name and Strength: mycophenolate (CELLCEPT) 500 mg Tab    Preferred Pharmacy with phone number: [facility] [phone]  Communication Preference: [phone]  Additional Information: N/A

## 2019-02-17 LAB — GLUCOSE SERPL-MCNC: 197 MG/DL (ref 70–110)

## 2019-02-18 ENCOUNTER — TELEPHONE (OUTPATIENT)
Dept: NEUROSURGERY | Facility: CLINIC | Age: 64
End: 2019-02-18

## 2019-02-18 DIAGNOSIS — M54.16 CHRONIC LUMBAR RADICULOPATHY: Primary | ICD-10-CM

## 2019-02-18 NOTE — TELEPHONE ENCOUNTER
Spoke with pt d/t referral from Dr. Estrada, pt is scheduled for MRI and will give call back to schedule appt//ns

## 2019-02-20 ENCOUNTER — TELEPHONE (OUTPATIENT)
Dept: PODIATRY | Facility: CLINIC | Age: 64
End: 2019-02-20

## 2019-02-20 NOTE — TELEPHONE ENCOUNTER
Left message requesting a call back from Ms. Sosa at [facility].      ----- Message from Georgina Chapman sent at 2/20/2019 11:14 AM CST -----  Contact: ms sosa-[facility]  FYI: still needs recent clinicals faxed to her at [phone]//ph:[phone]

## 2019-02-21 ENCOUNTER — TELEPHONE (OUTPATIENT)
Dept: NEUROSURGERY | Facility: CLINIC | Age: 64
End: 2019-02-21

## 2019-02-21 ENCOUNTER — TELEPHONE (OUTPATIENT)
Dept: PODIATRY | Facility: CLINIC | Age: 64
End: 2019-02-21

## 2019-02-21 ENCOUNTER — OFFICE VISIT (OUTPATIENT)
Dept: PODIATRY | Facility: CLINIC | Age: 64
End: 2019-02-21
Payer: MEDICARE

## 2019-02-21 VITALS — WEIGHT: 220 LBS | HEIGHT: 71 IN | BODY MASS INDEX: 30.8 KG/M2

## 2019-02-21 DIAGNOSIS — E11.621 DIABETIC ULCER OF LEFT MIDFOOT ASSOCIATED WITH TYPE 2 DIABETES MELLITUS, WITH FAT LAYER EXPOSED: Primary | ICD-10-CM

## 2019-02-21 DIAGNOSIS — L97.422 DIABETIC ULCER OF LEFT MIDFOOT ASSOCIATED WITH TYPE 2 DIABETES MELLITUS, WITH FAT LAYER EXPOSED: Primary | ICD-10-CM

## 2019-02-21 DIAGNOSIS — Z89.432 S/P TRANSMETATARSAL AMPUTATION OF FOOT, LEFT: ICD-10-CM

## 2019-02-21 PROCEDURE — 3008F BODY MASS INDEX DOCD: CPT | Mod: HCNC,CPTII,S$GLB, | Performed by: PODIATRIST

## 2019-02-21 PROCEDURE — 99212 OFFICE O/P EST SF 10 MIN: CPT | Mod: HCNC,S$GLB,, | Performed by: PODIATRIST

## 2019-02-21 PROCEDURE — 99212 PR OFFICE/OUTPT VISIT, EST, LEVL II, 10-19 MIN: ICD-10-PCS | Mod: HCNC,S$GLB,, | Performed by: PODIATRIST

## 2019-02-21 PROCEDURE — 3008F PR BODY MASS INDEX (BMI) DOCUMENTED: ICD-10-PCS | Mod: HCNC,CPTII,S$GLB, | Performed by: PODIATRIST

## 2019-02-21 PROCEDURE — 3045F PR MOST RECENT HEMOGLOBIN A1C LEVEL 7.0-9.0%: CPT | Mod: HCNC,CPTII,S$GLB, | Performed by: PODIATRIST

## 2019-02-21 PROCEDURE — 99999 PR PBB SHADOW E&M-EST. PATIENT-LVL IV: ICD-10-PCS | Mod: PBBFAC,HCNC,, | Performed by: PODIATRIST

## 2019-02-21 PROCEDURE — 1101F PR PT FALLS ASSESS DOC 0-1 FALLS W/OUT INJ PAST YR: ICD-10-PCS | Mod: HCNC,CPTII,S$GLB, | Performed by: PODIATRIST

## 2019-02-21 PROCEDURE — 1101F PT FALLS ASSESS-DOCD LE1/YR: CPT | Mod: HCNC,CPTII,S$GLB, | Performed by: PODIATRIST

## 2019-02-21 PROCEDURE — 99999 PR PBB SHADOW E&M-EST. PATIENT-LVL IV: CPT | Mod: PBBFAC,HCNC,, | Performed by: PODIATRIST

## 2019-02-21 PROCEDURE — 3045F PR MOST RECENT HEMOGLOBIN A1C LEVEL 7.0-9.0%: ICD-10-PCS | Mod: HCNC,CPTII,S$GLB, | Performed by: PODIATRIST

## 2019-02-21 RX ORDER — INSULIN DEGLUDEC 100 U/ML
30 INJECTION, SOLUTION SUBCUTANEOUS NIGHTLY
Qty: 27 ML | Refills: 3 | Status: SHIPPED | OUTPATIENT
Start: 2019-02-21 | End: 2019-04-12

## 2019-02-21 NOTE — PROGRESS NOTES
PODIATRIC MEDICINE AND SURGERY      CC:   Chief Complaint   Patient presents with    Follow-up     Wound Care F/U left foot.        SUBJECTIVE   Lavelle Ladd is a 63 y.o. male status post left revisional transmetatarsal amputation and secondary wound closure final closure November 5, 2018. Pt is approximately 2 weeks post operatively.  Patient has history of poor compliance and relates that he left against medical advice from nursing facility as he was not pleased with standard of care.  He has been changing his dressing at home without any physician or nurse orders.  He presents my clinic today for follow-up evaluation.  Denies any nausea vomiting fever or pain to the surgical site.  He states he has tried to be compliant with nonweightbearing to left lower extremity.  He is being currently managed with p.o. Antibiotics- ZYVOX (6 weeks duration) by Dr. Veliz with Infectious Disease as he did have positive cultures for osteomyelitis on amputation resection sites. He denies any fever. States he has changed dressing on his own every 3 days. He has tried to be compliant with NWB with wheel chair.  Pt requests PICC line removal. States PICC line is bothering him and he is no longer having IV and requests that I remove the PICC line.    12/19/18  Pt is here for 3 week follow up post operatively. He is currently taking oral Zyvox for osteomyelitis. No complaints. He has remained NWB with wheelchair at home. Denies N/V/F/C. Wound cultures recently taken last week Negative for bacteria. Home health dressing changes are being performed three times weekly. Wound cultures in the past Enterobacter.  is monitoring patient for antibiotic management.     Pt is also here for mobility evaluation. He is 5'11, 216 lbs. He currently uses a wheel chair at home which is difficult to utilize causing strain on his arms. He would like to receive a power wheelchair. He is at risk for limb loss. Still undergoing treatment for  foot amputation. He is not able to safely ambulate without assistance until the wound heals. He is on strict non weight bearing protocol to prevent loss of his leg. Pt is willing and able to perform bating, toileting, grooming and eating with a Power chair. The medical conditions limiting ambulation is an amputation of his left foot and amputation of RIGHT leg. He is a double amputee and would benefit from power wheel chair. He is unable to use a walker or a scooter  as he is unable to WALK on left foot due to recent amputation. He is unable to use a manual chair as he is a double amputee.       2/7/19  Pt is here for wound care follow up. Wound has progressed with signs of healing. He is fully weight bearing in CAM boot. Home health twice weekly MARIA LUISA application. He has been fitted for DM shoes and toe filler (Red Stick).  No further pedal complaints.   Patient complains about chronic back pain.  He is seen a pain management specialist at outside facility, [facility].  He states he has had full workup for his lower back problems and has been informed that he will need surgery.  He would like to get a referral to a neurosurgeon here at [facility].    2/21/2019  Patient is here for wound care follow-up.  He has been fitted for his diabetic shoes and toe filler.  He states that he has not received tissue but should receiving it within the next 1-2 weeks.  He does still have home health performing dressing changes.  He has no further pedal complaints.    Hemoglobin A1C   Date Value Ref Range Status   02/05/2019 8.0 (H) 4.0 - 5.6 % Final     Comment:     ADA Screening Guidelines:  5.7-6.4%  Consistent with prediabetes  >or=6.5%  Consistent with diabetes  High levels of fetal hemoglobin interfere with the HbA1C  assay. Heterozygous hemoglobin variants (HbS, HgC, etc)do  not significantly interfere with this assay.   However, presence of multiple variants may affect accuracy.     09/19/2018 8.0 (H) 4.0 -  "5.6 % Final     Comment:     ADA Screening Guidelines:  5.7-6.4%  Consistent with prediabetes  >or=6.5%  Consistent with diabetes  High levels of fetal hemoglobin interfere with the HbA1C  assay. Heterozygous hemoglobin variants (HbS, HgC, etc)do  not significantly interfere with this assay.   However, presence of multiple variants may affect accuracy.     07/07/2018 10.2 (H) 4.0 - 5.6 % Final     Comment:     ADA Screening Guidelines:  5.7-6.4%  Consistent with prediabetes  >or=6.5%  Consistent with diabetes  High levels of fetal hemoglobin interfere with the HbA1C  assay. Heterozygous hemoglobin variants (HbS, HgC, etc)do  not significantly interfere with this assay.   However, presence of multiple variants may affect accuracy.         OBJECTIVE   Vitals:    02/21/19 0902   Weight: 99.8 kg (220 lb 0.3 oz)   Height: 5' 11" (1.803 m)     LOWER EXTREMITY PHYSICAL EXAM  · Neurovascular status - vascular status grossly intact.    There are no open wounds distal TMA stump   no acute SOI noted     Radiographs reviewed:   -  post op.    Pathology:  FINAL PATHOLOGIC DIAGNOSIS  Metatarsal bone, 1-5:  Acute osteomyelitis.  Reactive bone and cartilage.      ASSESSMENT  Encounter Diagnoses   Name Primary?    Diabetic ulcer of left midfoot associated with type 2 diabetes mellitus, with fat layer exposed Yes    S/P transmetatarsal amputation of foot, left          PLAN  Wound is completely healed.  Patient was fitted in a surgical shoe.  Patient informed to not wear shoes until he receives his diabetic shoe with toe filler.  Patient will report back to my clinic in 1 month for diabetic foot checkup and will bring his custom shoes at that point.  Patient was instructed if any signs of infection or open wounds he is to report to my clinic immediately.    Disclaimer:  This note may have been prepared using voice recognition software, it may have not been extensively proofed, as such there could be errors within the text such as " sound alike errors.       Future Appointments   Date Time Provider Department Center   2/25/2019 11:00 AM [facility] [place]   3/1/2019  9:40 AM Bryan Singh MD [facility]   3/26/2019  9:20 AM Karla Wheeler DPM [facility] [place]   4/1/2019  1:00 PM Ronny Veliz MD [facility] None   5/15/2019 11:00 AM Amira Lynn Jr., PA-C [facility] [address]       Report Electronically Signed By:     Karla Wheeler DPM   Podiatric Medicine & Surgery  [facility]  2/21/2019

## 2019-02-21 NOTE — TELEPHONE ENCOUNTER
Pt requested prosthetic prescription be sent to [facility]. Call ended pleasantly. ----- Message from Elieser Parkinson sent at 2/21/2019  3:06 PM CST -----  Contact: pt   Pt trying to get paperwork filled out for prosthetic,and would like .       ..[phone] (home)

## 2019-02-22 ENCOUNTER — HOSPITAL ENCOUNTER (OUTPATIENT)
Dept: RADIOLOGY | Facility: HOSPITAL | Age: 64
Discharge: HOME | End: 2019-02-22
Attending: NEUROLOGICAL SURGERY
Payer: MEDICARE

## 2019-02-22 ENCOUNTER — TELEPHONE (OUTPATIENT)
Dept: RADIOLOGY | Facility: HOSPITAL | Age: 64
End: 2019-02-22

## 2019-02-22 DIAGNOSIS — M54.16 CHRONIC LUMBAR RADICULOPATHY: ICD-10-CM

## 2019-02-22 PROCEDURE — 72148 MRI LUMBAR SPINE W/O DYE: CPT | Mod: TC,HCNC

## 2019-02-22 PROCEDURE — 72148 MRI LUMBAR SPINE W/O DYE: CPT | Mod: 26,HCNC,, | Performed by: RADIOLOGY

## 2019-02-22 PROCEDURE — 72148 MRI LUMBAR SPINE WITHOUT CONTRAST: ICD-10-PCS | Mod: 26,HCNC,, | Performed by: RADIOLOGY

## 2019-02-25 ENCOUNTER — TELEPHONE (OUTPATIENT)
Dept: PODIATRY | Facility: CLINIC | Age: 64
End: 2019-02-25

## 2019-02-26 NOTE — TELEPHONE ENCOUNTER
----- Message from Becca Muñoz sent at 2/26/2019 11:34 AM CST -----  Contact: [facility]  Type:  Pharmacy Calling to Clarify an RX    Name of Caller: Griselda  [facility]: [facility]  Prescription Name: Victoza   What do they need to clarify?: Pharmacy is calling in regards to pt's rx. A new is rx is needed for pt's insuline. Pt is out of insuline.  Best Call Back Number: [phone]  Additional Information: n/a    Thanks,   Becca Muñoz

## 2019-03-01 ENCOUNTER — OFFICE VISIT (OUTPATIENT)
Dept: NEUROSURGERY | Facility: CLINIC | Age: 64
End: 2019-03-01
Payer: MEDICARE

## 2019-03-01 ENCOUNTER — TELEPHONE (OUTPATIENT)
Dept: NEUROSURGERY | Facility: CLINIC | Age: 64
End: 2019-03-01

## 2019-03-01 VITALS
DIASTOLIC BLOOD PRESSURE: 97 MMHG | WEIGHT: 220 LBS | SYSTOLIC BLOOD PRESSURE: 152 MMHG | HEART RATE: 94 BPM | HEIGHT: 71 IN | BODY MASS INDEX: 30.8 KG/M2

## 2019-03-01 DIAGNOSIS — M51.36 DEGENERATIVE DISC DISEASE, LUMBAR: Primary | ICD-10-CM

## 2019-03-01 DIAGNOSIS — M54.16 CHRONIC LEFT LUMBAR RADICULOPATHY: ICD-10-CM

## 2019-03-01 PROCEDURE — 3080F DIAST BP >= 90 MM HG: CPT | Mod: HCNC,CPTII,S$GLB, | Performed by: NEUROLOGICAL SURGERY

## 2019-03-01 PROCEDURE — 3077F PR MOST RECENT SYSTOLIC BLOOD PRESSURE >= 140 MM HG: ICD-10-PCS | Mod: HCNC,CPTII,S$GLB, | Performed by: NEUROLOGICAL SURGERY

## 2019-03-01 PROCEDURE — 1101F PT FALLS ASSESS-DOCD LE1/YR: CPT | Mod: HCNC,CPTII,S$GLB, | Performed by: NEUROLOGICAL SURGERY

## 2019-03-01 PROCEDURE — 99213 PR OFFICE/OUTPT VISIT, EST, LEVL III, 20-29 MIN: ICD-10-PCS | Mod: HCNC,S$GLB,, | Performed by: NEUROLOGICAL SURGERY

## 2019-03-01 PROCEDURE — 3077F SYST BP >= 140 MM HG: CPT | Mod: HCNC,CPTII,S$GLB, | Performed by: NEUROLOGICAL SURGERY

## 2019-03-01 PROCEDURE — 1101F PR PT FALLS ASSESS DOC 0-1 FALLS W/OUT INJ PAST YR: ICD-10-PCS | Mod: HCNC,CPTII,S$GLB, | Performed by: NEUROLOGICAL SURGERY

## 2019-03-01 PROCEDURE — 3080F PR MOST RECENT DIASTOLIC BLOOD PRESSURE >= 90 MM HG: ICD-10-PCS | Mod: HCNC,CPTII,S$GLB, | Performed by: NEUROLOGICAL SURGERY

## 2019-03-01 PROCEDURE — 3008F BODY MASS INDEX DOCD: CPT | Mod: HCNC,CPTII,S$GLB, | Performed by: NEUROLOGICAL SURGERY

## 2019-03-01 PROCEDURE — 99999 PR PBB SHADOW E&M-EST. PATIENT-LVL V: CPT | Mod: PBBFAC,HCNC,, | Performed by: NEUROLOGICAL SURGERY

## 2019-03-01 PROCEDURE — 99213 OFFICE O/P EST LOW 20 MIN: CPT | Mod: HCNC,S$GLB,, | Performed by: NEUROLOGICAL SURGERY

## 2019-03-01 PROCEDURE — 99999 PR PBB SHADOW E&M-EST. PATIENT-LVL V: ICD-10-PCS | Mod: PBBFAC,HCNC,, | Performed by: NEUROLOGICAL SURGERY

## 2019-03-01 PROCEDURE — 3008F PR BODY MASS INDEX (BMI) DOCUMENTED: ICD-10-PCS | Mod: HCNC,CPTII,S$GLB, | Performed by: NEUROLOGICAL SURGERY

## 2019-03-01 NOTE — TELEPHONE ENCOUNTER
Left message in reference to pt's appt today and Dr. Singh ordering pt Nerve Block R. Lumbar 3-4 with Dr. Lamar Guadalupe//ns

## 2019-03-06 ENCOUNTER — TELEPHONE (OUTPATIENT)
Dept: ORTHOPEDICS | Facility: CLINIC | Age: 64
End: 2019-03-06

## 2019-03-06 NOTE — TELEPHONE ENCOUNTER
Spoke with Neel, informed her of the injection Dr. Singh wants pt to have Nerve block R Lumbar 3-4//ns     ----- Message from Moni Newton sent at 3/6/2019  8:20 AM CST -----  Contact: Neel from Dr guadalupe  Type:  Needs Medical Advice    Who Called: Neel   Symptoms (please be specific):   How long has patient had these symptoms:    Pharmacy name and phone #:    Would the patient rather a call back or a response via [facility]? phone  Best Call Back Number: [phone]  Additional Information: states she's calling to get the last notes for pt stating which injection that Dr wants Dr Guadalupe to give & can fax the information to [phone]//thanks/dbw

## 2019-03-13 ENCOUNTER — TELEPHONE (OUTPATIENT)
Dept: NEUROSURGERY | Facility: CLINIC | Age: 64
End: 2019-03-13

## 2019-03-13 NOTE — TELEPHONE ENCOUNTER
Spoke with pt in reference to the message below, informed pt I spoke with Neel with Dr Guadalupe's office on 3/6 informed her of the injections Dr. Singh wants pt to have, pt stated he has an appt on 3/18 and will call to see if they will perform the injections on that day//ns     ----- Message from Louis Paredes sent at 3/13/2019 11:30 AM CDT -----  Contact: Self-[phone]  Pt would like to consult with the nurse about  Spine injections.  Please call  Back at [phone]. Thx-AH

## 2019-03-19 ENCOUNTER — TELEPHONE (OUTPATIENT)
Dept: ADMINISTRATIVE | Facility: CLINIC | Age: 64
End: 2019-03-19

## 2019-03-19 ENCOUNTER — TELEPHONE (OUTPATIENT)
Dept: INTERNAL MEDICINE | Facility: CLINIC | Age: 64
End: 2019-03-19

## 2019-03-19 NOTE — TELEPHONE ENCOUNTER
----- Message from Anuradha Felix sent at 3/19/2019  2:41 PM CDT -----  Contact: Dr [facility] medicine, Chrissie  Type:  Needs Medical Advice    Who Called: Ms Dickens  Symptoms (please be specific):    How long has patient had these symptoms:    Pharmacy name and phone #:    Would the patient rather a call back or a response via [facility]? call  Best Call Back Number: [phone] fax [phone]  Additional Information: Ms Dickens needs a referral for patients UC visit on 2/12/19.

## 2019-03-19 NOTE — TELEPHONE ENCOUNTER
Spoke to Benedicto and was advised that the patient saw  on 02/12/2019.  Benedicto then advised that  was listed as the patient pcp at the time of the visit, so [facility] will not pay for the visit with .  Benedicto also stated that she needs a referral from  so that  could get reimburse.  I advised Benedicto that  has been listed on the patient chart in our system since 2016 and has not seen .  I also advised benedicto that the patient would have to [facility] get [facility] and to have [facility] reimburse  for that visit on 02/12/2019.  Benedicto thanked me and stated that she will call the patient to have him contact [facility].

## 2019-03-19 NOTE — TELEPHONE ENCOUNTER
Home Health SOC 12/14/2018 - 02/11/2019 with [facility] ([facility]) - Dr. Karla Wheeler. Patient received SN services.

## 2019-03-20 RX ORDER — GABAPENTIN 300 MG/1
CAPSULE ORAL
Qty: 120 CAPSULE | Refills: 10 | OUTPATIENT
Start: 2019-03-20

## 2019-04-12 ENCOUNTER — CLINICAL SUPPORT (OUTPATIENT)
Dept: CARDIOLOGY | Facility: CLINIC | Age: 64
End: 2019-04-12
Payer: MEDICARE

## 2019-04-12 ENCOUNTER — OFFICE VISIT (OUTPATIENT)
Dept: NEUROSURGERY | Facility: CLINIC | Age: 64
End: 2019-04-12
Payer: MEDICARE

## 2019-04-12 ENCOUNTER — LAB VISIT (OUTPATIENT)
Dept: LAB | Facility: HOSPITAL | Age: 64
End: 2019-04-12
Attending: NEUROLOGICAL SURGERY
Payer: MEDICARE

## 2019-04-12 VITALS
DIASTOLIC BLOOD PRESSURE: 100 MMHG | BODY MASS INDEX: 31.4 KG/M2 | HEART RATE: 88 BPM | WEIGHT: 224.31 LBS | SYSTOLIC BLOOD PRESSURE: 175 MMHG | HEIGHT: 71 IN

## 2019-04-12 DIAGNOSIS — M54.16 CHRONIC LUMBAR RADICULOPATHY: ICD-10-CM

## 2019-04-12 DIAGNOSIS — R58 BLEEDING: ICD-10-CM

## 2019-04-12 DIAGNOSIS — M54.16 CHRONIC LUMBAR RADICULOPATHY: Primary | ICD-10-CM

## 2019-04-12 DIAGNOSIS — M51.36 DEGENERATIVE DISC DISEASE, LUMBAR: ICD-10-CM

## 2019-04-12 LAB
ALBUMIN SERPL BCP-MCNC: 3.7 G/DL (ref 3.5–5.2)
ALP SERPL-CCNC: 87 U/L (ref 55–135)
ALT SERPL W/O P-5'-P-CCNC: 14 U/L (ref 10–44)
ANION GAP SERPL CALC-SCNC: 11 MMOL/L (ref 8–16)
AST SERPL-CCNC: 15 U/L (ref 10–40)
BASOPHILS # BLD AUTO: 0.04 K/UL (ref 0–0.2)
BASOPHILS NFR BLD: 0.6 % (ref 0–1.9)
BILIRUB SERPL-MCNC: 0.5 MG/DL (ref 0.1–1)
BUN SERPL-MCNC: 19 MG/DL (ref 8–23)
CALCIUM SERPL-MCNC: 10 MG/DL (ref 8.7–10.5)
CHLORIDE SERPL-SCNC: 102 MMOL/L (ref 95–110)
CO2 SERPL-SCNC: 26 MMOL/L (ref 23–29)
CREAT SERPL-MCNC: 1.5 MG/DL (ref 0.5–1.4)
DIFFERENTIAL METHOD: ABNORMAL
EOSINOPHIL # BLD AUTO: 0 K/UL (ref 0–0.5)
EOSINOPHIL NFR BLD: 0.6 % (ref 0–8)
ERYTHROCYTE [DISTWIDTH] IN BLOOD BY AUTOMATED COUNT: 14.6 % (ref 11.5–14.5)
EST. GFR  (AFRICAN AMERICAN): 55.7 ML/MIN/1.73 M^2
EST. GFR  (NON AFRICAN AMERICAN): 48.2 ML/MIN/1.73 M^2
GLUCOSE SERPL-MCNC: 145 MG/DL (ref 70–110)
HCT VFR BLD AUTO: 51.8 % (ref 40–54)
HGB BLD-MCNC: 16.2 G/DL (ref 14–18)
IMM GRANULOCYTES # BLD AUTO: 0.02 K/UL (ref 0–0.04)
IMM GRANULOCYTES NFR BLD AUTO: 0.3 % (ref 0–0.5)
INR PPP: 1.1 (ref 0.8–1.2)
LYMPHOCYTES # BLD AUTO: 1.8 K/UL (ref 1–4.8)
LYMPHOCYTES NFR BLD: 26.8 % (ref 18–48)
MCH RBC QN AUTO: 28.6 PG (ref 27–31)
MCHC RBC AUTO-ENTMCNC: 31.3 G/DL (ref 32–36)
MCV RBC AUTO: 92 FL (ref 82–98)
MONOCYTES # BLD AUTO: 0.5 K/UL (ref 0.3–1)
MONOCYTES NFR BLD: 7.2 % (ref 4–15)
NEUTROPHILS # BLD AUTO: 4.3 K/UL (ref 1.8–7.7)
NEUTROPHILS NFR BLD: 64.5 % (ref 38–73)
NRBC BLD-RTO: 0 /100 WBC
PLATELET # BLD AUTO: 220 K/UL (ref 150–350)
PMV BLD AUTO: 10.6 FL (ref 9.2–12.9)
POTASSIUM SERPL-SCNC: 4.5 MMOL/L (ref 3.5–5.1)
PROT SERPL-MCNC: 7.5 G/DL (ref 6–8.4)
PROTHROMBIN TIME: 11 SEC (ref 9–12.5)
RBC # BLD AUTO: 5.66 M/UL (ref 4.6–6.2)
SODIUM SERPL-SCNC: 139 MMOL/L (ref 136–145)
WBC # BLD AUTO: 6.69 K/UL (ref 3.9–12.7)

## 2019-04-12 PROCEDURE — 99214 PR OFFICE/OUTPT VISIT, EST, LEVL IV, 30-39 MIN: ICD-10-PCS | Mod: HCNC,S$GLB,, | Performed by: NEUROLOGICAL SURGERY

## 2019-04-12 PROCEDURE — 85025 COMPLETE CBC W/AUTO DIFF WBC: CPT | Mod: HCNC

## 2019-04-12 PROCEDURE — 3080F PR MOST RECENT DIASTOLIC BLOOD PRESSURE >= 90 MM HG: ICD-10-PCS | Mod: HCNC,CPTII,S$GLB, | Performed by: NEUROLOGICAL SURGERY

## 2019-04-12 PROCEDURE — 1101F PT FALLS ASSESS-DOCD LE1/YR: CPT | Mod: HCNC,CPTII,S$GLB, | Performed by: NEUROLOGICAL SURGERY

## 2019-04-12 PROCEDURE — 85610 PROTHROMBIN TIME: CPT | Mod: HCNC

## 2019-04-12 PROCEDURE — 3080F DIAST BP >= 90 MM HG: CPT | Mod: HCNC,CPTII,S$GLB, | Performed by: NEUROLOGICAL SURGERY

## 2019-04-12 PROCEDURE — 99999 PR PBB SHADOW E&M-EST. PATIENT-LVL V: ICD-10-PCS | Mod: PBBFAC,HCNC,, | Performed by: NEUROLOGICAL SURGERY

## 2019-04-12 PROCEDURE — 93000 ELECTROCARDIOGRAM COMPLETE: CPT | Mod: HCNC,S$GLB,, | Performed by: NUCLEAR MEDICINE

## 2019-04-12 PROCEDURE — 3077F SYST BP >= 140 MM HG: CPT | Mod: HCNC,CPTII,S$GLB, | Performed by: NEUROLOGICAL SURGERY

## 2019-04-12 PROCEDURE — 93000 EKG 12-LEAD: ICD-10-PCS | Mod: HCNC,S$GLB,, | Performed by: NUCLEAR MEDICINE

## 2019-04-12 PROCEDURE — 3008F PR BODY MASS INDEX (BMI) DOCUMENTED: ICD-10-PCS | Mod: HCNC,CPTII,S$GLB, | Performed by: NEUROLOGICAL SURGERY

## 2019-04-12 PROCEDURE — 1101F PR PT FALLS ASSESS DOC 0-1 FALLS W/OUT INJ PAST YR: ICD-10-PCS | Mod: HCNC,CPTII,S$GLB, | Performed by: NEUROLOGICAL SURGERY

## 2019-04-12 PROCEDURE — 3077F PR MOST RECENT SYSTOLIC BLOOD PRESSURE >= 140 MM HG: ICD-10-PCS | Mod: HCNC,CPTII,S$GLB, | Performed by: NEUROLOGICAL SURGERY

## 2019-04-12 PROCEDURE — 99999 PR PBB SHADOW E&M-EST. PATIENT-LVL V: CPT | Mod: PBBFAC,HCNC,, | Performed by: NEUROLOGICAL SURGERY

## 2019-04-12 PROCEDURE — 99214 OFFICE O/P EST MOD 30 MIN: CPT | Mod: HCNC,S$GLB,, | Performed by: NEUROLOGICAL SURGERY

## 2019-04-12 PROCEDURE — 36415 COLL VENOUS BLD VENIPUNCTURE: CPT | Mod: HCNC

## 2019-04-12 PROCEDURE — 80053 COMPREHEN METABOLIC PANEL: CPT | Mod: HCNC

## 2019-04-12 PROCEDURE — 3008F BODY MASS INDEX DOCD: CPT | Mod: HCNC,CPTII,S$GLB, | Performed by: NEUROLOGICAL SURGERY

## 2019-04-12 NOTE — PROGRESS NOTES
Neurosurgery History and Physical  Subjective:      Patient ID: Lavelle Ladd is a 63 y.o. male.    Chief Complaint: No chief complaint on file.    Patient is here today for follow-up after having a selective left L3-4 nerve block  He states that he has got approximately 40% relief of his symptoms however they have since return.  He is ambulating unassisted.  He has  a right lower leg prosthesis  Pain continues to be in the bed going down the lower pain to the back of his calf  No weakness currently  He is wanting surgical options at this point and tells me to other neurosurgeons of offer him a 1 Level decompression the past    Review of Systems   Constitutional: Negative for activity change, appetite change and chills.   HENT: Negative for hearing loss, sore throat and tinnitus.    Eyes: Negative for pain, discharge and itching.   Cardiovascular: Negative for chest pain.   Gastrointestinal: Negative for abdominal pain.   Endocrine: Negative for cold intolerance and heat intolerance.   Genitourinary: Negative for difficulty urinating and dysuria.   Musculoskeletal: Positive for back pain and gait problem.   Allergic/Immunologic: Negative for environmental allergies.   Neurological: Positive for weakness. Negative for dizziness, tremors, light-headedness and headaches.   Hematological: Negative for adenopathy.   Psychiatric/Behavioral: Negative for agitation, behavioral problems and confusion.         Objective:       Physical Exam:  Nursing note and vitals reviewed.    Constitutional: He appears well-developed and well-nourished. No distress.     Eyes: Pupils are equal, round, and reactive to light. EOM are normal.     Cardiovascular: Intact distal pulses.     Abdominal: Soft.     Psych/Behavior: He is alert. He is oriented to person, place, and time. He has a normal mood and affect.     Neurological:        DTRs: Tricep reflexes are 2+ on the right side and 2+ on the left side. Bicep reflexes are 2+ on the right  side and 2+ on the left side. Brachioradialis reflexes are 2+ on the right side and 2+ on the left side. Achilles reflexes are 2+ on the right side and 2+ on the left side.     General    Nursing note and vitals reviewed.  Constitutional: He is oriented to person, place, and time. He appears well-developed and well-nourished. No distress.   HENT:   Head: Normocephalic and atraumatic.   Nose: Nose normal.   Eyes: EOM are normal. Pupils are equal, round, and reactive to light.   Neck: Normal range of motion. Neck supple.   Cardiovascular: Intact distal pulses.    Pulmonary/Chest: Effort normal. No respiratory distress.   Abdominal: Soft. He exhibits no distension.   Neurological: He is alert and oriented to person, place, and time. No cranial nerve deficit.   Psychiatric: He has a normal mood and affect. His behavior is normal.         Right Ankle/Foot Exam     Tests   Tiptoe Walk: able to perform  Single Heel Rise: able to perform  Double Heel Rise: unable to perform double heel rise    Left Ankle/Foot Exam     Tests   Heel Walk: able to perform  Tiptoe Walk: able to perform  Double Heel Rise: able to perform  Back (L-Spine & T-Spine) / Neck (C-Spine) Exam     Tenderness Posterior midline palpation reveals tenderness of the Upper C-Spine. Right paramedian tenderness of the Upper C-Spine. Left paramedian tenderness of the Upper C-Spine.     Back (L-Spine & T-Spine) Range of Motion   Lateral bend right: normal   Lateral bend left: normal     Neck (C-Spine) Range of Motion   Flexion:     Limited and mild  Extension: Limited and mild  Right Lateral Bend: normal  Left Lateral Bend: normal  Right Rotation: normal  Left Rotation: normal    Spinal Sensation   Right Side Sensation  C-Spine Level: normal   L-Spine Level: normal  Left Side Sensation  C-Spine Level: normal  L-Spine Level: normal    Back (L-Spine & T-Spine) Tests   Left Side Tests  Squat: able to perform    Neck (C-Spine) Tests   Spurling's Test   Left:   Negative  Right: negative      Muscle Strength   Right Upper Extremity   Biceps: 5/5/5   Deltoid:  5/5  Triceps:  5/5  Wrist extension: 5/5/5   Wrist flexion: 5/5/5   Finger Flexors:  5/5  Finger Extensors:  5/5  Left Upper Extremity  Biceps: 5/5/5   Deltoid:  5/5  Triceps:  5/5  Wrist extension: 5/5/5   Wrist flexion: 5/5/5   Finger Flexors:  5/5  Finger Extensors:  5/5  Right Lower Extremity   Hip Abduction: 5/5   Hip Flexion: 5/5   Hip Extensors: 5/5  Quadriceps:  5/5   Hamstrin/5   Anterior tibial:  0/5/5  Gastrocsoleus:  0/5/5  EHL:  0/5  Left Lower Extremity   Hip Abduction: 5/5   Hip Flexion: 5/5   Hip Extensors: 5/5  Quadriceps:  5/5   Hamstrin/5   Anterior tibial:  5/5/5   Gastrocsoleus:  5/5/5  EHL:  5/5    Reflexes     Left Side  Biceps:  2+  Triceps:  2+  Brachioradialis:  2+  Quadriceps:  2+  Post Tibial:  2+  Achilles:  2+  Left Drew's Sign:  Absent    Right Side   Biceps:  2+  Triceps:  2+  Brachioradialis:  2+  Quadriceps:  2+  Post Tibial:  2+  Achilles:  2+  Right Drew's Sign:  absent    I  reviewed all pertinent imaging regarding this case.  Assessment:     1. Chronic lumbar radiculopathy    2. Bleeding      Plan:     Chronic lumbar radiculopathy  -     EKG 12-lead; Future  -     CBC auto differential; Future; Expected date: 2019  -     Comprehensive metabolic panel; Future; Expected date: 2019  -     Case Request Operating Room: LAMINECTOMY, SPINE, LUMBAR, WITH DISCECTOMY    Bleeding  -     Protime-INR; Future; Expected date: 2019  -     Case Request Operating Room: LAMINECTOMY, SPINE, LUMBAR, WITH DISCECTOMY    Degenerative disc disease, lumbar     Patient is symptomatic from compression and foraminal stenosis on the left at L3-4.  Recommended a left-sided 1 level minimally invasive foraminotomy with possible diskectomy at this level.  He has a number of medical and cardiac issues I would like to get a medical clearance prior to beginning procedure.  Once he is  cleared we will schedule the patient for operation soon      Thank you for the referral   Please call with any questions    Bryan Singh MD  Neurosurgery

## 2019-04-16 ENCOUNTER — TELEPHONE (OUTPATIENT)
Dept: NEUROSURGERY | Facility: CLINIC | Age: 64
End: 2019-04-16

## 2019-04-16 NOTE — TELEPHONE ENCOUNTER
Phoned pt to get information about PCP----- Message from Aida Kaba LPN sent at 4/16/2019 11:50 AM CDT -----      ----- Message -----  From: Bren Ruiz PA-C  Sent: 4/15/2019   5:39 PM  To: Aida Kaba LPN    Patient had abnormal EKG. Make sure he follows up with his PCP or Cardiologist if he has one

## 2019-04-25 ENCOUNTER — TELEPHONE (OUTPATIENT)
Dept: NEUROSURGERY | Facility: CLINIC | Age: 64
End: 2019-04-25

## 2019-04-25 NOTE — TELEPHONE ENCOUNTER
Spoke with pt informed him surgery has been cancelled, d/t Dr Singh having a pressing matter to handle next week, will call pt to reschedule, informed pt he'll need clearance from PCP Dr. Esteban, informed pt i've made several attempts to contact 's office to send over pre op information wasn't successful asked pt if he'd had a different number stated the number he had martin was the only number he had and when I looked up the 's office it was the same number pt had given, state he'd go over there and give me a call back //ns

## 2019-04-26 ENCOUNTER — HOSPITAL ENCOUNTER (OUTPATIENT)
Dept: RADIOLOGY | Facility: HOSPITAL | Age: 64
Discharge: HOME | End: 2019-04-26
Attending: NEUROLOGICAL SURGERY | Admitting: NEUROLOGICAL SURGERY
Payer: MEDICARE

## 2019-04-26 ENCOUNTER — TELEPHONE (OUTPATIENT)
Dept: NEUROSURGERY | Facility: CLINIC | Age: 64
End: 2019-04-26

## 2019-04-26 DIAGNOSIS — Z01.818 PRE-OP EXAM: Primary | ICD-10-CM

## 2019-04-26 DIAGNOSIS — Z01.818 PRE-OP EXAM: ICD-10-CM

## 2019-04-26 PROCEDURE — 71046 X-RAY EXAM CHEST 2 VIEWS: CPT | Mod: 26,HCNC,, | Performed by: RADIOLOGY

## 2019-04-26 PROCEDURE — 71046 XR CHEST PA AND LATERAL PRE-OP: ICD-10-PCS | Mod: 26,HCNC,, | Performed by: RADIOLOGY

## 2019-04-26 PROCEDURE — 71046 X-RAY EXAM CHEST 2 VIEWS: CPT | Mod: TC,HCNC

## 2019-04-26 NOTE — TELEPHONE ENCOUNTER
Spoke with pt in [facility], pt will be scheduled for cxr today//ns     ----- Message from Anuradha Felix sent at 4/26/2019  8:25 AM CDT -----  Contact: Patient  Patient needs to talk to Claribel about his preop clearance. Patient is going to go and get an xray done at [facility], however there are no orders, explained that to patient that without orders radiology cant do them, patient states he is going to go anyway. Please call him back at [phone]. Thank you

## 2019-04-29 ENCOUNTER — TELEPHONE (OUTPATIENT)
Dept: NEUROSURGERY | Facility: CLINIC | Age: 64
End: 2019-04-29

## 2019-04-29 DIAGNOSIS — M51.26 HERNIATED INTERVERTEBRAL DISC OF LUMBAR SPINE: Primary | ICD-10-CM

## 2019-04-29 DIAGNOSIS — M48.062 LUMBAR STENOSIS WITH NEUROGENIC CLAUDICATION: ICD-10-CM

## 2019-04-29 NOTE — TELEPHONE ENCOUNTER
Spoke with pt in reference to surgery date 5/6/19, pt has an appt tomorrow with PCP for clearance     Pt has been informed of pre- operative instructions 4/12/19  Pt is scheduled for pre op testing on 4/12/19  Pt is having labs drawn ((CBC, CMP, PT/INR),4/12/19 EKG, 4/12/19 CXR  4/26/19)   Clearance pt with Dr. Esteban 4/30/19  Consents are signed 4/12/19  Consents are scanned into chart 4/25/19  Surgery 5/6/19  F/u appt 4/22/19and 1040am  PT states understanding of the above//na

## 2019-04-29 NOTE — PROGRESS NOTES
Subjective:      Patient ID: Lavelle aLdd is a 63 y.o. male.    Chief Complaint: Lumbar Spine Pain (L-Spine)    Patient has a history of chronic lumbar radiculopathy down the left side which he has had for many years.  No inciting event.  Pain is worse with activity and better with rest.  He has been outside pain management and has had several injections in the lumbar spine without any relief of the symptoms  He has seen multiple neurosurgeons in the past.  A neurosurgeon at the [facility] has offered him a laminectomy and diskectomy.  Most recently a neurosurgeon at [facility] also offered him a laminectomy and diskectomy however at that time he was being treated for an infection with antibiotics and was unable to participate in the surgery.  Currently he states that he has now been off of all antibiotics and he is cleared to that infection of the left toe    He has and EMG done recently  Which showed acute left L4 radiculopathy along with chronic L5 and S1 radiculopathy on the left side.    Review of Systems   Constitutional: Negative for activity change, appetite change and chills.   HENT: Negative for hearing loss, sore throat and tinnitus.    Eyes: Negative for pain, discharge and itching.   Cardiovascular: Negative for chest pain.   Gastrointestinal: Negative for abdominal pain.   Endocrine: Negative for cold intolerance and heat intolerance.   Genitourinary: Negative for difficulty urinating and dysuria.   Musculoskeletal: Positive for back pain and gait problem.   Allergic/Immunologic: Negative for environmental allergies.   Neurological: Positive for weakness. Negative for dizziness, tremors, light-headedness and headaches.   Hematological: Negative for adenopathy.   Psychiatric/Behavioral: Negative for agitation, behavioral problems and confusion.         Objective:       Physical Exam:  Nursing note and vitals reviewed.    Constitutional: He appears well-developed and well-nourished. No  distress.     Eyes: Pupils are equal, round, and reactive to light. EOM are normal.     Cardiovascular: Intact distal pulses.     Abdominal: Soft.     Psych/Behavior: He is alert. He is oriented to person, place, and time. He has a normal mood and affect.     Neurological:        DTRs: Tricep reflexes are 2+ on the right side and 2+ on the left side. Bicep reflexes are 2+ on the right side and 2+ on the left side. Brachioradialis reflexes are 2+ on the right side and 2+ on the left side. Achilles reflexes are 2+ on the right side and 2+ on the left side.     General    Nursing note and vitals reviewed.  Constitutional: He is oriented to person, place, and time. He appears well-developed and well-nourished. No distress.   HENT:   Head: Normocephalic and atraumatic.   Nose: Nose normal.   Eyes: EOM are normal. Pupils are equal, round, and reactive to light.   Neck: Normal range of motion. Neck supple.   Cardiovascular: Intact distal pulses.    Pulmonary/Chest: Effort normal. No respiratory distress.   Abdominal: Soft. He exhibits no distension.   Neurological: He is alert and oriented to person, place, and time. No cranial nerve deficit.   Psychiatric: He has a normal mood and affect. His behavior is normal.         Right Ankle/Foot Exam     Tests   Heel Walk: unable to perform  Tiptoe Walk: unable to perform  Single Heel Rise: able to perform  Double Heel Rise: unable to perform double heel rise    Left Ankle/Foot Exam     Tests   Heel Walk: unable to perform  Tiptoe Walk: unable to perform  Single Heel Rise: able to perform  Double Heel Rise: able to perform  Back (L-Spine & T-Spine) / Neck (C-Spine) Exam     Tenderness Posterior midline palpation reveals tenderness of the Upper L-Spine and Lower L-Spine. Right paramedian tenderness of the Lower L-Spine, Sacrum and Upper L-Spine. Left paramedian tenderness of the Lower L-Spine, Sacrum and Upper L-Spine.     Back (L-Spine & T-Spine) Range of Motion   Lateral bend  right: abnormal   Lateral bend left: abnormal     Neck (C-Spine) Range of Motion   Flexion:     Normal  Extension: Normal  Right Lateral Bend: normal  Left Lateral Bend: normal  Right Rotation: normal  Left Rotation: normal    Spinal Sensation   Right Side Sensation  C-Spine Level: normal   Left Side Sensation  C-Spine Level: normal  L-Spine Level: decreased    Back (L-Spine & T-Spine) Tests   Right Side Tests  Squat Test: unable to perform  Left Side Tests  Squat: unable to perform    Neck (C-Spine) Tests   Spurling's Test   Left:  Negative  Right: negative    Comments:  Please note that the patient has a artificial leg on the right side.      Muscle Strength   Right Upper Extremity   Biceps: 5/5/5   Deltoid:  5/5  Triceps:  5/5  Wrist extension: 5/5/5   Wrist flexion: 5/5/5   Finger Flexors:  5/5  Finger Extensors:  5/5  Left Upper Extremity  Biceps: 5/5/5   Deltoid:  5/5  Triceps:  5/5  Wrist extension: 5/5/5   Wrist flexion: 5/5/5   Finger Flexors:  5/5  Finger Extensors:  5/5  Right Lower Extremity   Hip Abduction: 5/5   Hip Flexion: 5/5   Hip Extensors: 5/5  Quadriceps:  5/5   Left Lower Extremity   Hip Abduction: 5/5   Hip Flexion: 5/5   Hip Extensors: 5/5  Quadriceps:  5/5   Hamstrin/5   Anterior tibial:  5/5/5   Gastrocsoleus:  4/5/5  EHL:  4/5    Reflexes     Left Side  Biceps:  2+  Triceps:  2+  Brachioradialis:  2+  Quadriceps:  2+  Post Tibial:  2+  Achilles:  2+  Left Drew's Sign:  Absent    Right Side   Biceps:  2+  Triceps:  2+  Brachioradialis:  2+  Quadriceps:  2+  Post Tibial:  2+  Achilles:  2+  Right Drew's Sign:  absent     MRI lumbar spine  L3-L4: Large broad-based disc bulge.  Mild bilateral facet arthropathy.  Moderate spinal canal stenosis with moderate right and mild left neural foraminal narrowing.    L4-L5: Broad-based disc bulge with moderate bilateral facet arthropathy.  Mild spinal canal stenosis with mild left and mild-to-moderate right neural foraminal  narrowing.    L5-S1: Mild bilateral facet arthropathy.  Small broad-based disc bulge. No spinal canal or neuroforaminal stenosis.  Assessment:     1. Degenerative disc disease, lumbar    2. Chronic left lumbar radiculopathy      Plan:     Degenerative disc disease, lumbar    Chronic left lumbar radiculopathy     Patient has chronic history of lumbar radiculopathy  Sent is not had a recent injection in the lumbar spine will send him to his outside pain physician for an injection left-sided L3-4 ALLI and see how he is doing and see if this any further treatment warranted     Thank you for the referral   Please call with any questions    Bryan Singh MD  Neurosurgery

## 2019-05-01 DIAGNOSIS — Z94.0 KIDNEY REPLACED BY TRANSPLANT: Primary | ICD-10-CM

## 2019-05-06 ENCOUNTER — LAB VISIT (OUTPATIENT)
Dept: LAB | Facility: HOSPITAL | Age: 64
End: 2019-05-06
Attending: INTERNAL MEDICINE
Payer: MEDICARE

## 2019-05-06 DIAGNOSIS — Z94.0 KIDNEY REPLACED BY TRANSPLANT: ICD-10-CM

## 2019-05-06 LAB
ALBUMIN SERPL BCP-MCNC: 3.5 G/DL (ref 3.5–5.2)
ALBUMIN SERPL BCP-MCNC: 3.5 G/DL (ref 3.5–5.2)
ALP SERPL-CCNC: 85 U/L (ref 55–135)
ALT SERPL W/O P-5'-P-CCNC: 12 U/L (ref 10–44)
ANION GAP SERPL CALC-SCNC: 10 MMOL/L (ref 8–16)
AST SERPL-CCNC: 9 U/L (ref 10–40)
BASOPHILS # BLD AUTO: 0.03 K/UL (ref 0–0.2)
BASOPHILS NFR BLD: 0.6 % (ref 0–1.9)
BILIRUB DIRECT SERPL-MCNC: 0.2 MG/DL (ref 0.1–0.3)
BILIRUB SERPL-MCNC: 0.6 MG/DL (ref 0.1–1)
BUN SERPL-MCNC: 19 MG/DL (ref 8–23)
CALCIUM SERPL-MCNC: 9.9 MG/DL (ref 8.7–10.5)
CHLORIDE SERPL-SCNC: 105 MMOL/L (ref 95–110)
CO2 SERPL-SCNC: 25 MMOL/L (ref 23–29)
CREAT SERPL-MCNC: 1.6 MG/DL (ref 0.5–1.4)
DIFFERENTIAL METHOD: ABNORMAL
EOSINOPHIL # BLD AUTO: 0.1 K/UL (ref 0–0.5)
EOSINOPHIL NFR BLD: 1.7 % (ref 0–8)
ERYTHROCYTE [DISTWIDTH] IN BLOOD BY AUTOMATED COUNT: 14.8 % (ref 11.5–14.5)
EST. GFR  (AFRICAN AMERICAN): 51.5 ML/MIN/1.73 M^2
EST. GFR  (NON AFRICAN AMERICAN): 44.5 ML/MIN/1.73 M^2
GLUCOSE SERPL-MCNC: 104 MG/DL (ref 70–110)
HCT VFR BLD AUTO: 49.8 % (ref 40–54)
HGB BLD-MCNC: 15.7 G/DL (ref 14–18)
IMM GRANULOCYTES # BLD AUTO: 0.01 K/UL (ref 0–0.04)
IMM GRANULOCYTES NFR BLD AUTO: 0.2 % (ref 0–0.5)
LYMPHOCYTES # BLD AUTO: 1.8 K/UL (ref 1–4.8)
LYMPHOCYTES NFR BLD: 36.8 % (ref 18–48)
MAGNESIUM SERPL-MCNC: 1.9 MG/DL (ref 1.6–2.6)
MCH RBC QN AUTO: 27.9 PG (ref 27–31)
MCHC RBC AUTO-ENTMCNC: 31.5 G/DL (ref 32–36)
MCV RBC AUTO: 89 FL (ref 82–98)
MONOCYTES # BLD AUTO: 0.5 K/UL (ref 0.3–1)
MONOCYTES NFR BLD: 10.4 % (ref 4–15)
NEUTROPHILS # BLD AUTO: 2.4 K/UL (ref 1.8–7.7)
NEUTROPHILS NFR BLD: 50.3 % (ref 38–73)
NRBC BLD-RTO: 0 /100 WBC
PHOSPHATE SERPL-MCNC: 2.9 MG/DL (ref 2.7–4.5)
PLATELET # BLD AUTO: 218 K/UL (ref 150–350)
PMV BLD AUTO: 10.2 FL (ref 9.2–12.9)
POTASSIUM SERPL-SCNC: 4.3 MMOL/L (ref 3.5–5.1)
PROT SERPL-MCNC: 7.2 G/DL (ref 6–8.4)
RBC # BLD AUTO: 5.62 M/UL (ref 4.6–6.2)
SODIUM SERPL-SCNC: 140 MMOL/L (ref 136–145)
WBC # BLD AUTO: 4.81 K/UL (ref 3.9–12.7)

## 2019-05-06 PROCEDURE — 80069 RENAL FUNCTION PANEL: CPT | Mod: HCNC

## 2019-05-06 PROCEDURE — 84075 ASSAY ALKALINE PHOSPHATASE: CPT | Mod: HCNC

## 2019-05-06 PROCEDURE — 86352 CELL FUNCTION ASSAY W/STIM: CPT | Mod: HCNC

## 2019-05-06 PROCEDURE — 82247 BILIRUBIN TOTAL: CPT | Mod: HCNC

## 2019-05-06 PROCEDURE — 85025 COMPLETE CBC W/AUTO DIFF WBC: CPT | Mod: HCNC

## 2019-05-06 PROCEDURE — 87799 DETECT AGENT NOS DNA QUANT: CPT | Mod: HCNC

## 2019-05-06 PROCEDURE — 83735 ASSAY OF MAGNESIUM: CPT | Mod: HCNC

## 2019-05-06 PROCEDURE — 80197 ASSAY OF TACROLIMUS: CPT | Mod: HCNC

## 2019-05-07 LAB — TACROLIMUS BLD-MCNC: 5.3 NG/ML (ref 5–15)

## 2019-05-08 LAB — IMMUNKNOW (STIMULATED): 357 NG/ML (ref 226–524)

## 2019-05-09 ENCOUNTER — TELEPHONE (OUTPATIENT)
Dept: TRANSPLANT | Facility: CLINIC | Age: 64
End: 2019-05-09

## 2019-05-09 LAB
BKV DNA SERPL NAA+PROBE-ACNC: <125 COPIES/ML
BKV DNA SERPL NAA+PROBE-LOG#: <2.1 LOG (10) COPIES/ML
BKV DNA SERPL QL NAA+PROBE: NOT DETECTED

## 2019-05-09 NOTE — TELEPHONE ENCOUNTER
Annual Transplant follow up assessment    Call placed to annual post transplant patient for health assessment and evaluation of need for annual transplant clinic visit.      Called Patient,No answer left voicemail.

## 2019-05-09 NOTE — ASSESSMENT & PLAN NOTE
Nephrology consult  Do we resume Prograf and prednisone   What Type Of Note Output Would You Prefer (Optional)?: Standard Output How Severe Is Your Skin Lesion?: mild Has Your Skin Lesion Been Treated?: not been treated Is This A New Presentation, Or A Follow-Up?: Skin Lesion Additional History: The pain 0/10. Additional History: Pain 0/10.

## 2019-05-10 NOTE — TELEPHONE ENCOUNTER
Patient has not been following with Primary Nephrologist on regular bases and has had a complicated post-transplant course, will schedule for 36mo transplant office visit.

## 2019-05-10 NOTE — TELEPHONE ENCOUNTER
Annual Transplant follow up assessment    Call placed to annual post transplant patient for health assessment and evaluation of need for annual transplant clinic visit.      -Are you following with a General Nephrologist? If so, who, and when was your last visit?  and  about a year ago    -Have you had any hospitalizations since your last visit? Yes 9 months ago for big toe amputation.     -What is your current dose of Immunos? Who prescribed your last refill? Prograf 0.5 mg 3 caps in the am and 3 caps at night,  prednisone 5 mg once daily,Cellcept 250 mg 1 caps in the morning and 1 caps in the evening.     -Do you have any new health problems? no    -Have you been told you have any form of Cancer? No    -Have you had any recurrent infections? Yes infection in Big toe left foot.

## 2019-05-22 ENCOUNTER — TELEPHONE (OUTPATIENT)
Dept: NEUROSURGERY | Facility: CLINIC | Age: 64
End: 2019-05-22

## 2019-05-22 NOTE — TELEPHONE ENCOUNTER
----- Message from Anuradha Felix sent at 5/22/2019  1:17 PM CDT -----  Contact: Patient  Type:  Needs Medical Advice    Who Called: Patient  Symptoms (please be specific):    How long has patient had these symptoms:    Pharmacy name and phone #:    Would the patient rather a call back or a response via [facility]? call  Best Call Back Number: [phone]  Additional Information: Patient needs to know if needs to bring his pre- op clearance in or not.

## 2019-05-22 NOTE — TELEPHONE ENCOUNTER
Spoke with patient and informed him to have his doctors office fax over his clearance to our office. Patient states that he will call his PCP tomorrow.

## 2019-05-29 ENCOUNTER — TELEPHONE (OUTPATIENT)
Dept: TRANSPLANT | Facility: CLINIC | Age: 64
End: 2019-05-29

## 2019-05-29 NOTE — TELEPHONE ENCOUNTER
Call received from patient reporting that he does not have anyone to bring him to his appointment today and unable to drive himself. appt rescheduled to 7/29/19.

## 2019-05-30 ENCOUNTER — TELEPHONE (OUTPATIENT)
Dept: NEUROSURGERY | Facility: CLINIC | Age: 64
End: 2019-05-30

## 2019-05-30 NOTE — TELEPHONE ENCOUNTER
Spoke with pt in reference to the message below informed pt, d/t his high risk status from Dr. Esteban, Dr Singh will not be able to perform his surgery, pt stated understanding//ns     ----- Message from Moni Newton sent at 5/30/2019  7:26 AM CDT -----  Contact: Pt  States he's calling to see if surgery has been scheduled and can be reached at [phone]//thanks/gopi

## 2019-06-03 ENCOUNTER — TELEPHONE (OUTPATIENT)
Dept: NEUROSURGERY | Facility: CLINIC | Age: 64
End: 2019-06-03

## 2019-06-03 NOTE — TELEPHONE ENCOUNTER
Spoke with pt in reference to the message below, informed pt he will have to contact his PCP d/t his evaluation/ risk assessment Dr. Singh won't be able to perform surgery, pt stated he will call PCP//ns    ----- Message from Mariposa Centeno sent at 6/3/2019  9:12 AM CDT -----  Contact: pt   Call pt regarding getting a written statement of why he cant have the surgery for his .    .[phone] (home)

## 2019-06-03 NOTE — TELEPHONE ENCOUNTER
Spoke with pt in reference to the message below, informed pt d/t PCP eval pt is considered high risk, Dr. Singh, will not be able to perform his surgery//ns     ----- Message from Adriana Samson sent at 6/3/2019  8:32 AM CDT -----  Contact: self-[phone]  Pt would like a call back regarding surgery scheduling. Please call back at [phone]. Rachel Quinn

## 2019-07-04 ENCOUNTER — HOSPITAL ENCOUNTER (EMERGENCY)
Facility: HOSPITAL | Age: 64
Discharge: HOME | End: 2019-07-04
Payer: MEDICARE

## 2019-07-04 VITALS
SYSTOLIC BLOOD PRESSURE: 148 MMHG | OXYGEN SATURATION: 99 % | HEIGHT: 71 IN | DIASTOLIC BLOOD PRESSURE: 90 MMHG | RESPIRATION RATE: 20 BRPM | WEIGHT: 224.44 LBS | BODY MASS INDEX: 31.42 KG/M2 | HEART RATE: 74 BPM | TEMPERATURE: 99 F

## 2019-07-04 DIAGNOSIS — M25.561 RIGHT KNEE PAIN: ICD-10-CM

## 2019-07-04 PROCEDURE — 99283 EMERGENCY DEPT VISIT LOW MDM: CPT | Mod: HCNC

## 2019-07-04 PROCEDURE — 25000003 PHARM REV CODE 250: Mod: HCNC | Performed by: PHYSICIAN ASSISTANT

## 2019-07-04 RX ORDER — INSULIN DEGLUDEC 100 U/ML
30 INJECTION, SOLUTION SUBCUTANEOUS 2 TIMES DAILY
COMMUNITY
Start: 2019-05-16 | End: 2020-02-21 | Stop reason: SDUPTHER

## 2019-07-04 RX ORDER — OXYCODONE AND ACETAMINOPHEN 7.5; 325 MG/1; MG/1
1 TABLET ORAL ONCE
Status: COMPLETED | OUTPATIENT
Start: 2019-07-04 | End: 2019-07-04

## 2019-07-04 RX ORDER — HYDROXYZINE HYDROCHLORIDE 50 MG/1
50 TABLET, FILM COATED ORAL 3 TIMES DAILY PRN
COMMUNITY
Start: 2019-04-23 | End: 2019-09-09 | Stop reason: CLARIF

## 2019-07-04 RX ADMIN — OXYCODONE HYDROCHLORIDE AND ACETAMINOPHEN 1 TABLET: 7.5; 325 TABLET ORAL at 01:07

## 2019-07-04 NOTE — ED PROVIDER NOTES
History      Chief Complaint   Patient presents with    Leg Pain     C/O RIGHT STUMP PAIN FOR 5 DAYS       Review of patient's allergies indicates:  No Known Allergies     HPI   HPI    2019, 1:25 AM   History obtained from the patient      History of Present Illness: Lavelle Ladd is a 63 y.o. male patient with right bka, who presents to the Emergency Department for right knee pain for 5 days, ran out of pain pills today.  He's in pain management, takes hydrocodone and oxycodone.  Denies fever, injury.  Symptoms are moderate in severity.     No further complaints or concerns at this time.           PCP: Rishabh Esteban MD       Past Medical History:  Past Medical History:   Diagnosis Date    DINORAH (acute kidney injury) 2016    Arthritis     CHF (congestive heart failure)     Chronic obstructive pulmonary disease 2016    Coronary artery disease involving native coronary artery of native heart without angina pectoris 2016     donor kidney transplant for DM 16     Induction with Thymo x3 and IV solumedrol to total 875mg  Kidney Biopsy  2016: 16 glomeruli, ACR type 1 AVR type 2, significant microcirculatory changes, c4d negative, No DSA, 5 to10% fibrosis. Treated with thymo x8 2016- no rejection      Diastolic heart failure     Encounter for blood transfusion     ESRD on RRT since 10/2013 10/29/2013    Biopsy proven diabetic nephropathy and lymphoplasmacytic interstitial infiltrate not c/w with AIN (ddx sjogrens or assoc with tamm-horsefall protein extravasation)     GERD (gastroesophageal reflux disease)     History of hepatitis C, s/p successful Rx w/ SVR12 - 2017    Completed 12 weeks harvoni w/ SVR    Hyperlipidemia     Hypertension     PIC line (peripherally inserted central catheter) flush     Prophylactic immunotherapy     Proteinuria     PVD (peripheral vascular disease) 2017    RLE BKA CT 16 Extensive atherosclerotic disease of  the aorta and mesenteric arteries.     Renal hypertension     Type 2 diabetes mellitus with diabetic neuropathy, with long-term current use of insulin 12/1/2016    Vitamin B12 deficiency          Past Surgical History:  Past Surgical History:   Procedure Laterality Date    AMPUTATION, FOOT, TRANSMETATARSAL Left 11/5/2018    Per[facility] at [facility] OR    AMPUTATION, FOOT, TRANSMETATARSAL Left 10/31/2018    [facility] OR    AMPUTATION, FOOT, TRANSMETATARSAL Left 9/21/2018    [facility] OR    av bovine graft      Left UE    AV FISTULA PLACEMENT      left UE    BIOPSY Tranplanted Kidney N/A 8/15/2016    Performed by [facility]au[facility]anna [facility] at [facility] OR 2ND FLR    BIOPSY, LIVER, TRANSJUGULAR APPROACH N/A 4/24/2014    Performed by [facility] at [facility] CATH LAB    CARDIAC CATHETERIZATION  02/2015    CLOSURE, WOUND Left 11/5/2018    [facility] OR    CLOSURE, WOUND Left 9/24/2018    [facility] OR    DEBRIDEMENT, METATARSAL BONE, 2 OR MORE BONES Left 11/5/2018    Performed by [facility] OR    INSERTION, CATHETER, VASCULAR N/A 10/31/2013    Performed by Ralph Mckeon MD at [facility] CATH LAB    IRRIGATION AND DEBRIDEMENT Left 7/9/2018    Performed by Katerina Magaña DPM at [facility] OR    IRRIGATION AND DEBRIDEMENT, LOWER EXTREMITY Left 10/31/2018    Per[facility] by [facility] OR    KIDNEY TRANSPLANT  05/21/2016    LEFT LEG ANGIOGRAM N/A 6/14/2018    Performed by Donal Mcdonald MD at [facility] CATH LAB    LEG AMPUTATION THROUGH KNEE  2011    right LE, started as nail puncture leading to diabetic ulcer    LENGTHENING, TENDON, ACHILLES Left 9/24/2018    [facility] OR    TRANSPLANT-KIDNEY Right 5/21/2016    Performed by Ronny Bergeron MD at [facility] OR 2ND FLR           Family History:  Family  History   Problem Relation Age of Onset    Cancer Father     Diabetes Father     Heart failure Father     Stroke Father     Heart failure Mother     Kidney disease Neg Hx            Social History:  Social History     Tobacco Use    Smoking status: Never Smoker    Smokeless tobacco: Never Used   Substance and Sexual Activity    Alcohol use: Yes     Alcohol/week: 3.6 oz     Types: 6 Cans of beer per week     Comment: seldom    Drug use: No    Sexual activity: Never       ROS     Review of Systems   Constitutional: Negative for chills and fever.   HENT: Negative for facial swelling and trouble swallowing.    Eyes: Negative for pain and discharge.   Respiratory: Negative for chest tightness and shortness of breath.    Cardiovascular: Negative for palpitations and leg swelling.   Gastrointestinal: Negative for diarrhea and vomiting.   Endocrine: Negative for polydipsia and polyuria.   Genitourinary: Negative for decreased urine volume and flank pain.   Musculoskeletal: Positive for arthralgias. Negative for joint swelling and neck stiffness.   Skin: Negative for rash and wound.   Neurological: Negative for syncope and light-headedness.   All other systems reviewed and are negative.      Physical Exam      Initial Vitals [07/04/19 0106]   BP Pulse Resp Temp SpO2   (!) 148/90 74 20 98.7 °F (37.1 °C) 99 %      MAP       --         Physical Exam  Vital signs and nursing notes reviewed.  Constitutional: Patient is in NAD. Awake and alert. Well-developed and well-nourished.  Head: Atraumatic. Normocephalic.  Eyes: PERRL. EOM intact. Conjunctivae nl. No scleral icterus.  ENT: Mucous membranes are moist. Oropharynx is clear.  Neck: Supple. No JVD. No lymphadenopathy.  No meningismus  Cardiovascular: Regular rate and rhythm. No murmurs, rubs, or gallops. Distal pulses are 2+ and symmetric.  Pulmonary/Chest: No respiratory distress. Clear to auscultation bilaterally. No wheezing, rales, or rhonchi.  Abdominal: Soft.  "Non-distended. No TTP. No rebound, guarding, or rigidity. Good bowel sounds.  Genitourinary: No CVA tenderness  Musculoskeletal: Moves all extremities.  Right knee with no erythema, edema or heat.  FROM.   Pt says patellar tenderness.   Hip with from and nontender.  Stump cap refill 2 sec.  No erythema, edema or heat to stump.  NO signs of cellulitis or ischemia.  Skin: Warm and dry.  Neurological: Awake and alert. No acute focal neurological deficits are appreciated.  Psychiatric: Normal affect. Good eye contact. Appropriate in content.      ED Course          Procedures  ED Vital Signs:  Vitals:    07/04/19 0106   BP: (!) 148/90   Pulse: 74   Resp: 20   Temp: 98.7 °F (37.1 °C)   TempSrc: Oral   SpO2: 99%   Weight: 101.8 kg (224 lb 6.9 oz)   Height: 5' 11" (1.803 m)                 Imaging Results:  Imaging Results          X-Ray Knee Complete 4 Or More Views Right (Preliminary result)  Result time 07/04/19 02:01:00    ED Interpretation by Lamar Montgomery PA-C (07/04/19 02:01:00, [facility] - , Emergency Medicine)    NAF                                 The Emergency Provider reviewed the vital signs and test results, which are outlined above.    ED Discussion             Medication(s) given in the ER:  Medications   oxyCODONE-acetaminophen 7.5-325 mg per tablet 1 tablet (1 tablet Oral Given 7/4/19 0130)           Follow-up Information     Rishabh Esteban MD In 2 days.    Specialty:  Family Medicine  Contact information:  [address]  [facility] [address]  [phone]                       New Prescriptions    No medications on file          Medical Decision Making        All findings were reviewed with the patient/family in detail.   All remaining questions and concerns were addressed at that time.  Patient/family has been counseled regarding the need for follow-up as well as the indication to return to the emergency room should new or worrisome developments occur.        MDM         "       Clinical Impression:        ICD-10-CM ICD-9-CM   1. Right knee pain M25.561 719.46             Lamar Montgomery PA-C  07/04/19 0246

## 2019-07-04 NOTE — ED NOTES
[facility] here to get patient. Pt doesn't meet indications for them to bill the patients insurance and will require payment. Patient willing to pay. Offered to arrange a cab but patient denies and states he does not want to use yellow cab and would rather pay for the ambulance.

## 2019-07-08 ENCOUNTER — NURSE TRIAGE (OUTPATIENT)
Dept: ADMINISTRATIVE | Facility: CLINIC | Age: 64
End: 2019-07-08

## 2019-07-08 NOTE — TELEPHONE ENCOUNTER
"Kidney Transplant 5/21/2016. Has a BKA and stump has been giving him a lot of pain. 8/10. Has called EMS twice in last week and has been seen in ED for stump pain. Calling to get some advice as to what he should do. Ruled out complications in ED. Pt threatened to take overdose of pain pills but declined after suicide concerns addressed states he did not really mean that. Pt verbalized out of Transplant medication but states he has contacted Transplant and was told he has to been evaluated in transplant before refills ordered. Pt advised to folllow up with visit to transplant. Contact non [facility] surgeon for BKA and also nurse will send this message to all concerned departments.    Reason for Disposition   Referral phone numbers for crisis intervention and depression, questions about    Answer Assessment - Initial Assessment Questions  1. CONCERN: "What happened that made you call today?"       Pt in pain  2. SUICIDE ATTEMPT: "Have you tried to harm yourself recently?"  "Have you ever tried to harm yourself before?"      no  3. RISK OF HARM - SUICIDAL IDEATION:  "Do you ever have thoughts of hurting or killing yourself?"  (e.g., yes, no, no but preoccupation with thoughts about death)    - INTENT:  "Do you have thoughts of hurting or killing yourself right NOW?" (e.g., yes, no, N/A)    - PLAN: "Do you have a specific plan for how you would do this?" (e.g., gun, knife, overdose, no plan, N/A)      denies  4. RISK OF HARM - HOMICIDAL IDEATION:  "Do you ever have thoughts of hurting or killing someone else?"  (e.g., yes, no, no but preoccupation with thoughts about death)    - INTENT:  "Do you have thoughts of hurting or killing someone right NOW?" (e.g., yes, no, N/A)    - PLAN: "Do you have a specific plan for how you would do this?" (e.g., gun, knife, no plan, N/A)       no  5. ACCESS: If yes to PLAN, "Do you have access to ___?" (e.g., pills stored in bathroom, firearm in house, knife in kitchen)      denies  6. " "SUPPORT: "Who is with you now?" "Who do you live with?" "Do you have family or friends nearby who you can talk to?"       Lives aide  7. THERAPIST: "Do you have a counselor or therapist? Name?"      no  8. STRESSORS: "Has there been any new stress or recent changes in your life?"      Pain to stump  9. DRUG ABUSE/ALCOHOL: "Do you drink alcohol or use any illegal drugs?"       Drink bear  10. OTHER: "Do you have any other health or medical symptoms right now?" (e.g., fever)        Pain to stump  11. PREGNANCY: "Is there any chance you are pregnant?" "When was your last menstrual period?"        no    Protocols used: [facility] [id number]      "

## 2019-07-09 ENCOUNTER — TELEPHONE (OUTPATIENT)
Dept: NEUROSURGERY | Facility: CLINIC | Age: 64
End: 2019-07-09

## 2019-07-09 RX ORDER — TACROLIMUS 0.5 MG/1
1.5 CAPSULE ORAL EVERY 12 HOURS
Qty: 180 CAPSULE | Refills: 0 | Status: SHIPPED | OUTPATIENT
Start: 2019-07-09 | End: 2019-08-16 | Stop reason: SDUPTHER

## 2019-07-09 RX ORDER — MYCOPHENOLATE MOFETIL 500 MG/1
500 TABLET ORAL 2 TIMES DAILY
Qty: 120 TABLET | Refills: 0 | Status: SHIPPED | OUTPATIENT
Start: 2019-07-09 | End: 2019-09-16 | Stop reason: SDUPTHER

## 2019-07-09 NOTE — TELEPHONE ENCOUNTER
Spoke w/ patient he indicated he has a legal matter going on currently.     Patient says he needs a letter from MD Singh stating why previous discuss procedure can't be done in writing.       He's requesting letter to be mailed to home [facility] so he can present to his .         ----- Message from Kirby Neil sent at 7/9/2019  1:36     M CDT -----  Contact: [phone]  Would like a nurse to contact him regarding a surgery that he wasn't approved for, please contact him @ [phone]. Thanks

## 2019-07-10 NOTE — TELEPHONE ENCOUNTER
Call placed to patient in follow up of ED visit. Patient with chronic pain, but in better spirit today. Unsure if he will be able to come to [place] for Transplant Clinic follow up due to inability to drive right now. Unable to but on Prosthetic due to stump pain. Reports no infection noted on eval in ED. Call placed to Nephrology, appointment made for re-establishing care with Dr. Estrada. Patient agreeable to appointment 9/5/19

## 2019-07-21 ENCOUNTER — HOSPITAL ENCOUNTER (INPATIENT)
Facility: HOSPITAL | Age: 64
LOS: 2 days | Discharge: HOME HEALTH | DRG: 280 | End: 2019-07-23
Attending: EMERGENCY MEDICINE | Admitting: INTERNAL MEDICINE
Payer: MEDICARE

## 2019-07-21 DIAGNOSIS — I21.3 STEMI (ST ELEVATION MYOCARDIAL INFARCTION): ICD-10-CM

## 2019-07-21 DIAGNOSIS — I12.9 HYPERTENSIVE CHRONIC KIDNEY DISEASE WITH STAGE 1 THROUGH STAGE 4 CHRONIC KIDNEY DISEASE, OR UNSPECIFIED CHRONIC KIDNEY DISEASE: ICD-10-CM

## 2019-07-21 DIAGNOSIS — R07.9 CHEST PAIN: ICD-10-CM

## 2019-07-21 DIAGNOSIS — I21.09 ACUTE ST ELEVATION MYOCARDIAL INFARCTION (STEMI) OF ANTEROLATERAL WALL: ICD-10-CM

## 2019-07-21 PROBLEM — N18.9 CRI (CHRONIC RENAL INSUFFICIENCY): Status: ACTIVE | Noted: 2019-07-21

## 2019-07-21 PROBLEM — R94.31 ABNORMAL ECG: Status: ACTIVE | Noted: 2019-07-21

## 2019-07-21 PROBLEM — I21.19 ACUTE ST ELEVATION MYOCARDIAL INFARCTION (STEMI) OF INFERIOR WALL: Status: ACTIVE | Noted: 2019-07-21

## 2019-07-21 PROBLEM — I73.9 PAD (PERIPHERAL ARTERY DISEASE): Status: ACTIVE | Noted: 2019-07-21

## 2019-07-21 PROBLEM — I25.10 CAD IN NATIVE ARTERY: Status: ACTIVE | Noted: 2019-07-21

## 2019-07-21 LAB
ABO + RH BLD: NORMAL
ALBUMIN SERPL BCP-MCNC: 2.6 G/DL (ref 3.5–5.2)
ALBUMIN SERPL BCP-MCNC: 2.8 G/DL (ref 3.5–5.2)
ALP SERPL-CCNC: 84 U/L (ref 55–135)
ALP SERPL-CCNC: 92 U/L (ref 55–135)
ALT SERPL W/O P-5'-P-CCNC: 11 U/L (ref 10–44)
ALT SERPL W/O P-5'-P-CCNC: 12 U/L (ref 10–44)
ANION GAP SERPL CALC-SCNC: 12 MMOL/L (ref 8–16)
ANION GAP SERPL CALC-SCNC: 12 MMOL/L (ref 8–16)
ANION GAP SERPL CALC-SCNC: 14 MMOL/L (ref 8–16)
APTT BLDCRRT: 44 SEC (ref 21–32)
AST SERPL-CCNC: 22 U/L (ref 10–40)
AST SERPL-CCNC: 25 U/L (ref 10–40)
BASOPHILS # BLD AUTO: 0.01 K/UL (ref 0–0.2)
BASOPHILS # BLD AUTO: 0.02 K/UL (ref 0–0.2)
BASOPHILS NFR BLD: 0.1 % (ref 0–1.9)
BASOPHILS NFR BLD: 0.1 % (ref 0–1.9)
BILIRUB SERPL-MCNC: 0.8 MG/DL (ref 0.1–1)
BILIRUB SERPL-MCNC: 0.9 MG/DL (ref 0.1–1)
BILIRUB UR QL STRIP: NEGATIVE
BLD GP AB SCN CELLS X3 SERPL QL: NORMAL
BUN SERPL-MCNC: 21 MG/DL (ref 8–23)
BUN SERPL-MCNC: 22 MG/DL (ref 8–23)
BUN SERPL-MCNC: 22 MG/DL (ref 8–23)
CALCIUM SERPL-MCNC: 9 MG/DL (ref 8.7–10.5)
CALCIUM SERPL-MCNC: 9 MG/DL (ref 8.7–10.5)
CALCIUM SERPL-MCNC: 9.3 MG/DL (ref 8.7–10.5)
CHLORIDE SERPL-SCNC: 101 MMOL/L (ref 95–110)
CHLORIDE SERPL-SCNC: 104 MMOL/L (ref 95–110)
CHLORIDE SERPL-SCNC: 104 MMOL/L (ref 95–110)
CHOLEST SERPL-MCNC: 123 MG/DL (ref 120–199)
CHOLEST/HDLC SERPL: 2.7 {RATIO} (ref 2–5)
CK MB SERPL-MCNC: 8.9 NG/ML (ref 0.1–6.5)
CK MB SERPL-RTO: 13.5 % (ref 0–5)
CK SERPL-CCNC: 66 U/L (ref 20–200)
CK SERPL-CCNC: 66 U/L (ref 20–200)
CLARITY UR: CLEAR
CO2 SERPL-SCNC: 14 MMOL/L (ref 23–29)
CO2 SERPL-SCNC: 14 MMOL/L (ref 23–29)
CO2 SERPL-SCNC: 18 MMOL/L (ref 23–29)
COLOR UR: YELLOW
CORONARY STENOSIS: ABNORMAL
CREAT SERPL-MCNC: 1.4 MG/DL (ref 0.5–1.4)
CREAT SERPL-MCNC: 1.4 MG/DL (ref 0.5–1.4)
CREAT SERPL-MCNC: 1.6 MG/DL (ref 0.5–1.4)
DIFFERENTIAL METHOD: ABNORMAL
DIFFERENTIAL METHOD: ABNORMAL
EOSINOPHIL # BLD AUTO: 0 K/UL (ref 0–0.5)
EOSINOPHIL # BLD AUTO: 0 K/UL (ref 0–0.5)
EOSINOPHIL NFR BLD: 0.1 % (ref 0–8)
EOSINOPHIL NFR BLD: 0.3 % (ref 0–8)
ERYTHROCYTE [DISTWIDTH] IN BLOOD BY AUTOMATED COUNT: 13.7 % (ref 11.5–14.5)
ERYTHROCYTE [DISTWIDTH] IN BLOOD BY AUTOMATED COUNT: 13.8 % (ref 11.5–14.5)
EST. GFR  (AFRICAN AMERICAN): 51 ML/MIN/1.73 M^2
EST. GFR  (AFRICAN AMERICAN): >60 ML/MIN/1.73 M^2
EST. GFR  (AFRICAN AMERICAN): >60 ML/MIN/1.73 M^2
EST. GFR  (NON AFRICAN AMERICAN): 45 ML/MIN/1.73 M^2
EST. GFR  (NON AFRICAN AMERICAN): 52 ML/MIN/1.73 M^2
EST. GFR  (NON AFRICAN AMERICAN): 52 ML/MIN/1.73 M^2
ESTIMATED AVG GLUCOSE: 140 MG/DL (ref 68–131)
GLUCOSE SERPL-MCNC: 170 MG/DL (ref 70–110)
GLUCOSE SERPL-MCNC: 170 MG/DL (ref 70–110)
GLUCOSE SERPL-MCNC: 190 MG/DL (ref 70–110)
GLUCOSE UR QL STRIP: NEGATIVE
HBA1C MFR BLD HPLC: 6.5 % (ref 4–5.6)
HCT VFR BLD AUTO: 44.8 % (ref 40–54)
HCT VFR BLD AUTO: 45.4 % (ref 40–54)
HDLC SERPL-MCNC: 45 MG/DL (ref 40–75)
HDLC SERPL: 36.6 % (ref 20–50)
HGB BLD-MCNC: 15 G/DL (ref 14–18)
HGB BLD-MCNC: 15.3 G/DL (ref 14–18)
HGB UR QL STRIP: NEGATIVE
INR PPP: 1 (ref 0.8–1.2)
INR PPP: 1.1 (ref 0.8–1.2)
KETONES UR QL STRIP: NEGATIVE
LDLC SERPL CALC-MCNC: 59.4 MG/DL (ref 63–159)
LEUKOCYTE ESTERASE UR QL STRIP: NEGATIVE
LYMPHOCYTES # BLD AUTO: 2.1 K/UL (ref 1–4.8)
LYMPHOCYTES # BLD AUTO: 2.6 K/UL (ref 1–4.8)
LYMPHOCYTES NFR BLD: 16.9 % (ref 18–48)
LYMPHOCYTES NFR BLD: 17.5 % (ref 18–48)
MAGNESIUM SERPL-MCNC: 1.7 MG/DL (ref 1.6–2.6)
MCH RBC QN AUTO: 29.9 PG (ref 27–31)
MCH RBC QN AUTO: 30.1 PG (ref 27–31)
MCHC RBC AUTO-ENTMCNC: 33.5 G/DL (ref 32–36)
MCHC RBC AUTO-ENTMCNC: 33.7 G/DL (ref 32–36)
MCV RBC AUTO: 89 FL (ref 82–98)
MCV RBC AUTO: 90 FL (ref 82–98)
MONOCYTES # BLD AUTO: 1.8 K/UL (ref 0.3–1)
MONOCYTES # BLD AUTO: 2.1 K/UL (ref 0.3–1)
MONOCYTES NFR BLD: 14.3 % (ref 4–15)
MONOCYTES NFR BLD: 14.4 % (ref 4–15)
NEUTROPHILS # BLD AUTO: 8.5 K/UL (ref 1.8–7.7)
NEUTROPHILS # BLD AUTO: 9.9 K/UL (ref 1.8–7.7)
NEUTROPHILS NFR BLD: 68.2 % (ref 38–73)
NEUTROPHILS NFR BLD: 68.8 % (ref 38–73)
NITRITE UR QL STRIP: NEGATIVE
NONHDLC SERPL-MCNC: 78 MG/DL
PERIPHERAL STENOSIS: ABNORMAL
PH UR STRIP: 6 [PH] (ref 5–8)
PHOSPHATE SERPL-MCNC: 2.8 MG/DL (ref 2.7–4.5)
PLATELET # BLD AUTO: 253 K/UL (ref 150–350)
PLATELET # BLD AUTO: 254 K/UL (ref 150–350)
PMV BLD AUTO: 9.5 FL (ref 9.2–12.9)
PMV BLD AUTO: 9.5 FL (ref 9.2–12.9)
POC ACTIVATED CLOTTING TIME K: 109 SEC (ref 74–137)
POC ACTIVATED CLOTTING TIME K: 169 SEC (ref 74–137)
POC ACTIVATED CLOTTING TIME K: 202 SEC (ref 74–137)
POCT GLUCOSE: 117 MG/DL (ref 70–110)
POCT GLUCOSE: 147 MG/DL (ref 70–110)
POTASSIUM SERPL-SCNC: 4.3 MMOL/L (ref 3.5–5.1)
POTASSIUM SERPL-SCNC: 4.8 MMOL/L (ref 3.5–5.1)
POTASSIUM SERPL-SCNC: 4.8 MMOL/L (ref 3.5–5.1)
PROT SERPL-MCNC: 6.3 G/DL (ref 6–8.4)
PROT SERPL-MCNC: 6.7 G/DL (ref 6–8.4)
PROT UR QL STRIP: ABNORMAL
PROTHROMBIN TIME: 11.1 SEC (ref 9–12.5)
PROTHROMBIN TIME: 11.9 SEC (ref 9–12.5)
RBC # BLD AUTO: 4.99 M/UL (ref 4.6–6.2)
RBC # BLD AUTO: 5.11 M/UL (ref 4.6–6.2)
RETIRED EF AND QEF - SEE NOTES: 60 (ref 55–65)
SAMPLE: ABNORMAL
SAMPLE: ABNORMAL
SAMPLE: NORMAL
SODIUM SERPL-SCNC: 130 MMOL/L (ref 136–145)
SODIUM SERPL-SCNC: 130 MMOL/L (ref 136–145)
SODIUM SERPL-SCNC: 133 MMOL/L (ref 136–145)
SP GR UR STRIP: <=1.005 (ref 1–1.03)
TRIGL SERPL-MCNC: 93 MG/DL (ref 30–150)
TROPONIN I SERPL DL<=0.01 NG/ML-MCNC: 11.54 NG/ML (ref 0–0.03)
TROPONIN I SERPL DL<=0.01 NG/ML-MCNC: 12.45 NG/ML (ref 0–0.03)
TROPONIN I SERPL DL<=0.01 NG/ML-MCNC: 7.17 NG/ML (ref 0–0.03)
TROPONIN I SERPL DL<=0.01 NG/ML-MCNC: 9.35 NG/ML (ref 0–0.03)
TSH SERPL DL<=0.005 MIU/L-ACNC: 0.91 UIU/ML (ref 0.4–4)
URN SPEC COLLECT METH UR: ABNORMAL
UROBILINOGEN UR STRIP-ACNC: NEGATIVE EU/DL
WBC # BLD AUTO: 12.35 K/UL (ref 3.9–12.7)
WBC # BLD AUTO: 14.6 K/UL (ref 3.9–12.7)

## 2019-07-21 PROCEDURE — 85610 PROTHROMBIN TIME: CPT | Mod: HCNC

## 2019-07-21 PROCEDURE — 63600175 PHARM REV CODE 636 W HCPCS: Mod: HCNC

## 2019-07-21 PROCEDURE — 84100 ASSAY OF PHOSPHORUS: CPT | Mod: HCNC

## 2019-07-21 PROCEDURE — 99285 EMERGENCY DEPT VISIT HI MDM: CPT | Mod: HCNC

## 2019-07-21 PROCEDURE — 21400001 HC TELEMETRY ROOM: Mod: HCNC

## 2019-07-21 PROCEDURE — 63600175 PHARM REV CODE 636 W HCPCS: Mod: HCNC | Performed by: INTERNAL MEDICINE

## 2019-07-21 PROCEDURE — 93458 CATH LAB PROCEDURE: ICD-10-PCS | Mod: 26,HCNC,, | Performed by: INTERNAL MEDICINE

## 2019-07-21 PROCEDURE — 25000003 PHARM REV CODE 250: Mod: HCNC | Performed by: NURSE PRACTITIONER

## 2019-07-21 PROCEDURE — 25000003 PHARM REV CODE 250: Mod: HCNC | Performed by: INTERNAL MEDICINE

## 2019-07-21 PROCEDURE — 93458 L HRT ARTERY/VENTRICLE ANGIO: CPT | Mod: 26,HCNC,, | Performed by: INTERNAL MEDICINE

## 2019-07-21 PROCEDURE — 99152 MOD SED SAME PHYS/QHP 5/>YRS: CPT | Mod: HCNC,,, | Performed by: INTERNAL MEDICINE

## 2019-07-21 PROCEDURE — 83036 HEMOGLOBIN GLYCOSYLATED A1C: CPT | Mod: HCNC

## 2019-07-21 PROCEDURE — 94761 N-INVAS EAR/PLS OXIMETRY MLT: CPT | Mod: HCNC

## 2019-07-21 PROCEDURE — 27000014 CATH LAB PROCEDURE: Mod: HCNC

## 2019-07-21 PROCEDURE — 85730 THROMBOPLASTIN TIME PARTIAL: CPT | Mod: HCNC

## 2019-07-21 PROCEDURE — 99152 CATH LAB PROCEDURE: ICD-10-PCS | Mod: HCNC,,, | Performed by: INTERNAL MEDICINE

## 2019-07-21 PROCEDURE — 93010 EKG 12-LEAD: ICD-10-PCS | Mod: HCNC,,, | Performed by: INTERNAL MEDICINE

## 2019-07-21 PROCEDURE — 86901 BLOOD TYPING SEROLOGIC RH(D): CPT | Mod: HCNC

## 2019-07-21 PROCEDURE — 80053 COMPREHEN METABOLIC PANEL: CPT | Mod: HCNC

## 2019-07-21 PROCEDURE — 94799 UNLISTED PULMONARY SVC/PX: CPT | Mod: HCNC

## 2019-07-21 PROCEDURE — 93306 2D ECHO WITH COLOR FLOW DOPPLER: ICD-10-PCS | Mod: 26,HCNC,, | Performed by: INTERNAL MEDICINE

## 2019-07-21 PROCEDURE — 93306 TTE W/DOPPLER COMPLETE: CPT | Mod: 26,HCNC,, | Performed by: INTERNAL MEDICINE

## 2019-07-21 PROCEDURE — 84443 ASSAY THYROID STIM HORMONE: CPT | Mod: HCNC

## 2019-07-21 PROCEDURE — 36415 COLL VENOUS BLD VENIPUNCTURE: CPT | Mod: HCNC

## 2019-07-21 PROCEDURE — 83735 ASSAY OF MAGNESIUM: CPT | Mod: HCNC

## 2019-07-21 PROCEDURE — 82550 ASSAY OF CK (CPK): CPT | Mod: HCNC

## 2019-07-21 PROCEDURE — 84484 ASSAY OF TROPONIN QUANT: CPT | Mod: HCNC

## 2019-07-21 PROCEDURE — 80061 LIPID PANEL: CPT | Mod: HCNC

## 2019-07-21 PROCEDURE — 25500020 PHARM REV CODE 255: Mod: HCNC

## 2019-07-21 PROCEDURE — 84484 ASSAY OF TROPONIN QUANT: CPT | Mod: 91,HCNC

## 2019-07-21 PROCEDURE — 99223 1ST HOSP IP/OBS HIGH 75: CPT | Mod: HCNC,,, | Performed by: INTERNAL MEDICINE

## 2019-07-21 PROCEDURE — 75710 ARTERY X-RAYS ARM/LEG: CPT | Mod: 26,59,HCNC, | Performed by: INTERNAL MEDICINE

## 2019-07-21 PROCEDURE — 80053 COMPREHEN METABOLIC PANEL: CPT | Mod: 91,HCNC

## 2019-07-21 PROCEDURE — 81003 URINALYSIS AUTO W/O SCOPE: CPT | Mod: HCNC

## 2019-07-21 PROCEDURE — 27000221 HC OXYGEN, UP TO 24 HOURS: Mod: HCNC

## 2019-07-21 PROCEDURE — 99223 PR INITIAL HOSPITAL CARE,LEVL III: ICD-10-PCS | Mod: HCNC,,, | Performed by: INTERNAL MEDICINE

## 2019-07-21 PROCEDURE — 25000003 PHARM REV CODE 250: Mod: HCNC

## 2019-07-21 PROCEDURE — 99291 PR CRITICAL CARE, E/M 30-74 MINUTES: ICD-10-PCS | Mod: HCNC,,, | Performed by: INTERNAL MEDICINE

## 2019-07-21 PROCEDURE — 93005 ELECTROCARDIOGRAM TRACING: CPT | Mod: HCNC

## 2019-07-21 PROCEDURE — 99291 CRITICAL CARE FIRST HOUR: CPT | Mod: HCNC,,, | Performed by: INTERNAL MEDICINE

## 2019-07-21 PROCEDURE — 85025 COMPLETE CBC W/AUTO DIFF WBC: CPT | Mod: 91,HCNC

## 2019-07-21 PROCEDURE — 75710 CATH LAB PROCEDURE: ICD-10-PCS | Mod: 26,59,HCNC, | Performed by: INTERNAL MEDICINE

## 2019-07-21 PROCEDURE — C1887 CATHETER, GUIDING: HCPCS | Mod: HCNC

## 2019-07-21 PROCEDURE — 93306 TTE W/DOPPLER COMPLETE: CPT | Mod: HCNC

## 2019-07-21 PROCEDURE — 51702 INSERT TEMP BLADDER CATH: CPT | Mod: HCNC

## 2019-07-21 PROCEDURE — 82553 CREATINE MB FRACTION: CPT | Mod: HCNC

## 2019-07-21 PROCEDURE — 85610 PROTHROMBIN TIME: CPT | Mod: 91,HCNC

## 2019-07-21 PROCEDURE — 93010 ELECTROCARDIOGRAM REPORT: CPT | Mod: HCNC,,, | Performed by: INTERNAL MEDICINE

## 2019-07-21 PROCEDURE — 99900035 HC TECH TIME PER 15 MIN (STAT): Mod: HCNC

## 2019-07-21 RX ORDER — INSULIN ASPART 100 [IU]/ML
0-5 INJECTION, SOLUTION INTRAVENOUS; SUBCUTANEOUS
Status: DISCONTINUED | OUTPATIENT
Start: 2019-07-21 | End: 2019-07-21

## 2019-07-21 RX ORDER — INSULIN ASPART 100 [IU]/ML
1-10 INJECTION, SOLUTION INTRAVENOUS; SUBCUTANEOUS
Status: DISCONTINUED | OUTPATIENT
Start: 2019-07-21 | End: 2019-07-23 | Stop reason: HOSPADM

## 2019-07-21 RX ORDER — ONDANSETRON 2 MG/ML
4 INJECTION INTRAMUSCULAR; INTRAVENOUS EVERY 12 HOURS PRN
Status: DISCONTINUED | OUTPATIENT
Start: 2019-07-21 | End: 2019-07-21

## 2019-07-21 RX ORDER — IBUPROFEN 200 MG
16 TABLET ORAL
Status: DISCONTINUED | OUTPATIENT
Start: 2019-07-21 | End: 2019-07-23 | Stop reason: HOSPADM

## 2019-07-21 RX ORDER — GABAPENTIN 300 MG/1
300 CAPSULE ORAL 3 TIMES DAILY
Status: DISCONTINUED | OUTPATIENT
Start: 2019-07-21 | End: 2019-07-23 | Stop reason: HOSPADM

## 2019-07-21 RX ORDER — GLUCAGON 1 MG
1 KIT INJECTION
Status: DISCONTINUED | OUTPATIENT
Start: 2019-07-21 | End: 2019-07-23 | Stop reason: HOSPADM

## 2019-07-21 RX ORDER — PANTOPRAZOLE SODIUM 40 MG/1
40 TABLET, DELAYED RELEASE ORAL DAILY
Status: DISCONTINUED | OUTPATIENT
Start: 2019-07-21 | End: 2019-07-23 | Stop reason: HOSPADM

## 2019-07-21 RX ORDER — HYDROCODONE BITARTRATE AND ACETAMINOPHEN 5; 325 MG/1; MG/1
1 TABLET ORAL EVERY 4 HOURS PRN
Status: DISCONTINUED | OUTPATIENT
Start: 2019-07-21 | End: 2019-07-21

## 2019-07-21 RX ORDER — IBUPROFEN 200 MG
24 TABLET ORAL
Status: DISCONTINUED | OUTPATIENT
Start: 2019-07-21 | End: 2019-07-23 | Stop reason: HOSPADM

## 2019-07-21 RX ORDER — ATORVASTATIN CALCIUM 40 MG/1
40 TABLET, FILM COATED ORAL NIGHTLY
Status: DISCONTINUED | OUTPATIENT
Start: 2019-07-21 | End: 2019-07-21

## 2019-07-21 RX ORDER — ASPIRIN 81 MG/1
81 TABLET ORAL DAILY
Status: DISCONTINUED | OUTPATIENT
Start: 2019-07-21 | End: 2019-07-23 | Stop reason: HOSPADM

## 2019-07-21 RX ORDER — SODIUM CHLORIDE 9 MG/ML
INJECTION, SOLUTION INTRAVENOUS CONTINUOUS
Status: DISCONTINUED | OUTPATIENT
Start: 2019-07-21 | End: 2019-07-21

## 2019-07-21 RX ORDER — HYDRALAZINE HYDROCHLORIDE 20 MG/ML
10 INJECTION INTRAMUSCULAR; INTRAVENOUS EVERY 4 HOURS PRN
Status: DISCONTINUED | OUTPATIENT
Start: 2019-07-21 | End: 2019-07-23 | Stop reason: HOSPADM

## 2019-07-21 RX ORDER — METOPROLOL TARTRATE 25 MG/1
25 TABLET, FILM COATED ORAL 2 TIMES DAILY
Status: DISCONTINUED | OUTPATIENT
Start: 2019-07-21 | End: 2019-07-23 | Stop reason: HOSPADM

## 2019-07-21 RX ORDER — HYDROCODONE BITARTRATE AND ACETAMINOPHEN 10; 325 MG/1; MG/1
2 TABLET ORAL EVERY 6 HOURS PRN
Status: DISCONTINUED | OUTPATIENT
Start: 2019-07-21 | End: 2019-07-22

## 2019-07-21 RX ORDER — MORPHINE SULFATE 4 MG/ML
2 INJECTION, SOLUTION INTRAMUSCULAR; INTRAVENOUS
Status: DISCONTINUED | OUTPATIENT
Start: 2019-07-21 | End: 2019-07-21

## 2019-07-21 RX ORDER — ENOXAPARIN SODIUM 100 MG/ML
40 INJECTION SUBCUTANEOUS EVERY 24 HOURS
Status: DISCONTINUED | OUTPATIENT
Start: 2019-07-21 | End: 2019-07-21

## 2019-07-21 RX ORDER — ISOSORBIDE MONONITRATE 30 MG/1
30 TABLET, EXTENDED RELEASE ORAL DAILY
Status: DISCONTINUED | OUTPATIENT
Start: 2019-07-21 | End: 2019-07-23 | Stop reason: HOSPADM

## 2019-07-21 RX ORDER — MORPHINE SULFATE 2 MG/ML
2 INJECTION, SOLUTION INTRAMUSCULAR; INTRAVENOUS EVERY 4 HOURS PRN
Status: DISCONTINUED | OUTPATIENT
Start: 2019-07-21 | End: 2019-07-23 | Stop reason: HOSPADM

## 2019-07-21 RX ORDER — ALBUTEROL SULFATE 0.83 MG/ML
2.5 SOLUTION RESPIRATORY (INHALATION) EVERY 6 HOURS PRN
Status: DISCONTINUED | OUTPATIENT
Start: 2019-07-21 | End: 2019-07-23 | Stop reason: HOSPADM

## 2019-07-21 RX ORDER — AMLODIPINE BESYLATE 10 MG/1
10 TABLET ORAL DAILY
Status: DISCONTINUED | OUTPATIENT
Start: 2019-07-21 | End: 2019-07-23 | Stop reason: HOSPADM

## 2019-07-21 RX ORDER — GUAIFENESIN 100 MG/5ML
200 SOLUTION ORAL EVERY 4 HOURS PRN
Status: DISCONTINUED | OUTPATIENT
Start: 2019-07-21 | End: 2019-07-23 | Stop reason: HOSPADM

## 2019-07-21 RX ORDER — ALPRAZOLAM 0.25 MG/1
0.25 TABLET ORAL 3 TIMES DAILY PRN
Status: DISCONTINUED | OUTPATIENT
Start: 2019-07-21 | End: 2019-07-23 | Stop reason: HOSPADM

## 2019-07-21 RX ORDER — SODIUM BICARBONATE 650 MG/1
650 TABLET ORAL 2 TIMES DAILY
Status: DISCONTINUED | OUTPATIENT
Start: 2019-07-21 | End: 2019-07-23 | Stop reason: HOSPADM

## 2019-07-21 RX ORDER — MORPHINE SULFATE 10 MG/ML
2 INJECTION INTRAMUSCULAR; INTRAVENOUS; SUBCUTANEOUS
Status: DISCONTINUED | OUTPATIENT
Start: 2019-07-21 | End: 2019-07-21

## 2019-07-21 RX ORDER — MYCOPHENOLATE MOFETIL 500 MG/1
500 TABLET ORAL 2 TIMES DAILY
Status: DISCONTINUED | OUTPATIENT
Start: 2019-07-21 | End: 2019-07-23 | Stop reason: HOSPADM

## 2019-07-21 RX ORDER — PREDNISONE 5 MG/1
5 TABLET ORAL DAILY
Status: DISCONTINUED | OUTPATIENT
Start: 2019-07-21 | End: 2019-07-23 | Stop reason: HOSPADM

## 2019-07-21 RX ORDER — HEPARIN SODIUM 5000 [USP'U]/ML
5000 INJECTION, SOLUTION INTRAVENOUS; SUBCUTANEOUS EVERY 8 HOURS
Status: DISCONTINUED | OUTPATIENT
Start: 2019-07-21 | End: 2019-07-23 | Stop reason: HOSPADM

## 2019-07-21 RX ORDER — DIPHENHYDRAMINE HYDROCHLORIDE 50 MG/ML
25 INJECTION INTRAMUSCULAR; INTRAVENOUS EVERY 6 HOURS PRN
Status: DISCONTINUED | OUTPATIENT
Start: 2019-07-21 | End: 2019-07-21

## 2019-07-21 RX ORDER — CLOPIDOGREL BISULFATE 75 MG/1
75 TABLET ORAL DAILY
Status: DISCONTINUED | OUTPATIENT
Start: 2019-07-22 | End: 2019-07-21

## 2019-07-21 RX ORDER — GUAIFENESIN/DEXTROMETHORPHAN 100-10MG/5
10 SYRUP ORAL EVERY 4 HOURS PRN
Status: DISCONTINUED | OUTPATIENT
Start: 2019-07-21 | End: 2019-07-23 | Stop reason: HOSPADM

## 2019-07-21 RX ADMIN — SODIUM BICARBONATE 650 MG TABLET 650 MG: at 10:07

## 2019-07-21 RX ADMIN — MYCOPHENOLATE MOFETIL 500 MG: 500 TABLET, FILM COATED ORAL at 10:07

## 2019-07-21 RX ADMIN — ALPRAZOLAM 0.25 MG: 0.25 TABLET ORAL at 10:07

## 2019-07-21 RX ADMIN — METOPROLOL TARTRATE 25 MG: 25 TABLET ORAL at 10:07

## 2019-07-21 RX ADMIN — ALUMINUM HYDROXIDE, MAGNESIUM HYDROXIDE, AND SIMETHICONE: 200; 200; 20 SUSPENSION ORAL at 06:07

## 2019-07-21 RX ADMIN — ISOSORBIDE MONONITRATE 30 MG: 30 TABLET, EXTENDED RELEASE ORAL at 10:07

## 2019-07-21 RX ADMIN — ASPIRIN 81 MG: 81 TABLET, COATED ORAL at 10:07

## 2019-07-21 RX ADMIN — GABAPENTIN 300 MG: 300 CAPSULE ORAL at 10:07

## 2019-07-21 RX ADMIN — AMLODIPINE BESYLATE 10 MG: 10 TABLET ORAL at 10:07

## 2019-07-21 RX ADMIN — HEPARIN SODIUM 5000 UNITS: 5000 INJECTION, SOLUTION INTRAVENOUS; SUBCUTANEOUS at 02:07

## 2019-07-21 RX ADMIN — MORPHINE SULFATE 2 MG: 10 INJECTION, SOLUTION INTRAMUSCULAR; INTRAVENOUS at 05:07

## 2019-07-21 RX ADMIN — SODIUM CHLORIDE: 0.9 INJECTION, SOLUTION INTRAVENOUS at 10:07

## 2019-07-21 RX ADMIN — ONDANSETRON 4 MG: 2 INJECTION INTRAMUSCULAR; INTRAVENOUS at 01:07

## 2019-07-21 RX ADMIN — MORPHINE SULFATE 2 MG: 4 INJECTION, SOLUTION INTRAMUSCULAR; INTRAVENOUS at 09:07

## 2019-07-21 RX ADMIN — HEPARIN SODIUM 5000 UNITS: 5000 INJECTION, SOLUTION INTRAVENOUS; SUBCUTANEOUS at 10:07

## 2019-07-21 RX ADMIN — PREDNISONE 5 MG: 5 TABLET ORAL at 10:07

## 2019-07-21 RX ADMIN — GABAPENTIN 300 MG: 300 CAPSULE ORAL at 02:07

## 2019-07-21 RX ADMIN — PANTOPRAZOLE SODIUM 40 MG: 40 TABLET, DELAYED RELEASE ORAL at 10:07

## 2019-07-21 NOTE — H&P
[facility] Hospital Medicine  History & Physical    Patient Name: Lavelle Ladd  MRN: [id number]  Admission Date: 2019  Attending Physician: Alberto Mensah MD  Primary Care Provider: Rishabh Esteban MD         Patient information was obtained from patient, past medical records and ER records.     Subjective:     Principal Problem:Acute ST elevation myocardial infarction (STEMI) of anterolateral wall    Chief Complaint:   Chief Complaint   Patient presents with    Chest Pain     Pt c/o chest pain since 6 pm, 324 ASA, 300 mg plavix, 5000 units of heparin recieved in route. Denies pain at this time         HPI: 63-year-old male with significant medical comorbidities presented this evening with chest pain. Patient was evaluated in emergency room and classified as a STEMI was taken to the cath lab by Cardiology.  Patient is status post heart catheterization.  Hospital Medicine consulted for assistance with medical management    Past Medical History:   Diagnosis Date    DINORAH (acute kidney injury) 2016    Arthritis     CAD in native artery 2019    CHF (congestive heart failure)     Chronic obstructive pulmonary disease 2016    Coronary artery disease involving native coronary artery of native heart without angina pectoris 2016    CRI (chronic renal insufficiency) 2019     donor kidney transplant for DM 16     Induction with Thymo x3 and IV solumedrol to total 875mg  Kidney Biopsy  2016: 16 glomeruli, ACR type 1 AVR type 2, significant microcirculatory changes, c4d negative, No DSA, 5 to10% fibrosis. Treated with thymo x8 2016- no rejection      Diastolic heart failure     Encounter for blood transfusion     ESRD on RRT since 10/2013 10/29/2013    Biopsy proven diabetic nephropathy and lymphoplasmacytic interstitial infiltrate not c/w with AIN (ddx sjogrens or assoc with tamm-horsefall protein extravasation)     GERD (gastroesophageal reflux  disease)     History of hepatitis C, s/p successful Rx w/ SVR12 - 4/2017 4/5/2017    Completed 12 weeks harvoni w/ SVR    Hyperlipidemia     Hypertension     PAD (peripheral artery disease) 7/21/2019    PIC line (peripherally inserted central catheter) flush     Prophylactic immunotherapy     Proteinuria     PVD (peripheral vascular disease) 6/26/2017    RLE BKA CT 12/11/16 Extensive atherosclerotic disease of the aorta and mesenteric arteries.     Renal hypertension     Type 2 diabetes mellitus with diabetic neuropathy, with long-term current use of insulin 12/1/2016    Vitamin B12 deficiency        Past Surgical History:   Procedure Laterality Date    AMPUTATION, FOOT, TRANSMETATARSAL Left 11/5/2018    Performed by Karla Wheeler DPM at [facility] [place]    AMPUTATION, FOOT, TRANSMETATARSAL Left 10/31/2018    Performed by Karla Wheeler DPM at [facility] [place]    AMPUTATION, FOOT, TRANSMETATARSAL Left 9/21/2018    Performed by Karla Wheeelr DPM at [facility] [place]    av bovine graft      Left UE    AV FISTULA PLACEMENT      left UE    BIOPSY Tranplanted Kidney N/A 8/15/2016    Performed by Paulette Hanna MD at [facility] OR 2ND FLR    BIOPSY, LIVER, TRANSJUGULAR APPROACH N/A 4/24/2014    Performed by [facility] at [facility] [place] CATH LAB    CARDIAC CATHETERIZATION  02/2015    CLOSURE, WOUND Left 11/5/2018    Performed by Karla Wheeler DPM at [facility] [place]    CLOSURE, WOUND Left 9/24/2018    Performed by Karla Wheeler DPM at [facility] [place]    DEBRIDEMENT, METATARSAL BONE, 2 OR MORE BONES Left 11/5/2018    Performed by Karla Wheeler DPM at [facility] [place]    INSERTION, CATHETER, VASCULAR N/A 10/31/2013    Performed by Ralph Mckeon MD at [facility] [place] CATH LAB    IRRIGATION AND DEBRIDEMENT Left 7/9/2018    Performed by Katerina Magaña DPM at [facility] [place]    IRRIGATION AND DEBRIDEMENT, LOWER EXTREMITY Left 10/31/2018    Performed by Karla Wheeler DPM at [facility] [place]    KIDNEY TRANSPLANT  05/21/2016    LEFT LEG  ANGIOGRAM N/A 6/14/2018    Performed by Donal Mcdonald [facility] at [facility] C[facility] LAB    LEG AMPUTATION THROUGH KNEE  2011    right LE, started as nail puncture leading to diabetic ulcer    LENGTHENING, TENDON, ACHILLES Left 9/24/2018    Performed by Karla Wheeler [facility] at [facility] [place]    TRANSPLANT-KIDNEY Right 5/21/2016    Performed by Ronny Bergeron MD at [facility] OR [address] [place] [address]       Review of patient's allergies indicates:  No Known Allergies    No current facility-administered medications on file prior to encounter.      Current Outpatient Medications on File Prior to Encounter   Medication Sig    amLODIPine (NORVASC) 10 MG tablet Take 1 tablet (10 mg total) by mouth once daily.    aspirin (ECOTRIN) 81 MG EC tablet Take 1 tablet (81 mg total) by mouth once daily.    BD INSULIN SYRINGE ULTRA-FINE 1 mL 31 gauge x 5/16 Syrg USE ONE AS DIRECTED FOUR TIMES DAILY WITH MEALS AND AT BEDTIME    blood sugar diagnostic Strp 1 each by Misc.(Non-Drug; Combo Route) route 3 (three) times daily.    blood-glucose meter kit Use as instructed    budesonide-formoterol 160-4.5 mcg (SYMBICORT) 160-4.5 mcg/actuation HFAA Inhale 2 puffs into the lungs every 12 (twelve) hours. Wash out mouth after using    ergocalciferol (ERGOCALCIFEROL) 50,000 unit Cap Take 1 capsule (50,000 Units total) by mouth every 7 days. Take on Mondays    flash glucose scanning reader (FREESTYLE BRYAN 14 DAY READER) Misc 1 Device by Misc.(Non-Drug; Combo Route) route as needed.    flash glucose sensor (FREESTYLE BRYAN 14 DAY SENSOR) Kit 1 kit by Misc.(Non-Drug; Combo Route) route every 14 (fourteen) days.    gabapentin (NEURONTIN) 300 MG capsule Take 1 capsule (300 mg total) by mouth 3 (three) times daily. for 10 days    HYDROcodone-acetaminophen (NORCO)  mg per tablet     hydrOXYzine (ATARAX) 50 MG tablet     liraglutide 0.6 mg/0.1 mL, 18 mg/3 mL, subq PNIJ (VICTOZA 2-KOKI) 0.6 mg/0.1 mL (18 mg/3 mL) PnIj Inject 1.8 mg into the skin once daily.    metoprolol  "tartrate (LOPRESSOR) 25 MG tablet Take 1 tablet (25 mg total) by mouth 2 (two) times daily.    mycophenolate (CELLCEPT) 500 mg Tab Take 1 tablet (500 mg total) by mouth 2 (two) times daily.    ondansetron (ZOFRAN-ODT) 4 MG TbDL Take 1 tablet (4 mg total) by mouth every 8 (eight) hours as needed.    pen needle, diabetic (SURE-FINE PEN NEEDLES) 31 gauge x 3/16" Ndle 1 each by Misc.(Non-Drug; Combo Route) route 4 (four) times daily with meals and nightly.    predniSONE (DELTASONE) 5 MG tablet Take 1 tablet (5 mg total) by mouth once daily.    sodium bicarbonate 650 MG tablet Take 4 tablets (2,600 mg total) by mouth 2 (two) times daily.    tacrolimus (PROGRAF) 0.5 MG Cap Take 3 capsules (1.5 mg total) by mouth every 12 (twelve) hours. Z94.0 Kidney Transplant    TRESIBA FLEXTOUCH U-100 100 unit/mL (3 mL) InPn Inject 30 Units into the skin 2 (two) times daily.     Family History     Problem Relation (Age of Onset)    Cancer Father    Diabetes Father    Heart failure Father, Mother    Stroke Father        Tobacco Use    Smoking status: Never Smoker    Smokeless tobacco: Never Used   Substance and Sexual Activity    Alcohol use: Yes     Alcohol/week: 3.6 oz     Types: 6 Cans of beer per week     Comment: seldom    Drug use: No    Sexual activity: Never     Review of Systems   Constitutional: Negative for chills, diaphoresis, fatigue and fever.   HENT: Negative for congestion, sore throat and voice change.    Eyes: Negative for photophobia and visual disturbance.   Respiratory: Negative for cough, shortness of breath, wheezing and stridor.    Cardiovascular: Positive for chest pain. Negative for leg swelling.   Gastrointestinal: Negative for abdominal distention, abdominal pain, constipation, diarrhea, nausea and vomiting.   Endocrine: Negative for polydipsia, polyphagia and polyuria.   Genitourinary: Negative for difficulty urinating, dysuria, flank pain, testicular pain and urgency.   Musculoskeletal: Negative " for back pain, joint swelling, neck pain and neck stiffness.   Skin: Negative for color change and rash.   Allergic/Immunologic: Negative for immunocompromised state.   Neurological: Negative for dizziness, syncope, weakness, numbness and headaches.   Hematological: Does not bruise/bleed easily.   Psychiatric/Behavioral: Negative for agitation, behavioral problems and confusion.     Objective:     Vital Signs (Most Recent):  Temp: 97.6 °F (36.4 °C) (07/21/19 0436)  Pulse: 96 (07/21/19 0436)  Resp: 16 (07/21/19 0436)  BP: (!) 143/112 (07/21/19 0436)  SpO2: 99 % (07/21/19 0244) Vital Signs (24h Range):  Temp:  [97.5 °F (36.4 °C)-97.6 °F (36.4 °C)] 97.6 °F (36.4 °C)  Pulse:  [96-99] 96  Resp:  [16-23] 16  SpO2:  [99 %] 99 %  BP: (143-177)/() 143/112     Weight: 97.2 kg (214 lb 4.6 oz)  Body mass index is 29.89 kg/m².    Physical Exam   Constitutional: He is oriented to person, place, and time. He appears well-developed. No distress.   Elderly male, appears older than stated age   HENT:   Head: Normocephalic and atraumatic.   Nose: Nose normal.   Eyes: Pupils are equal, round, and reactive to light. Conjunctivae and EOM are normal. No scleral icterus.   Neck: Normal range of motion. Neck supple. No tracheal deviation present.   Cardiovascular: Normal rate, regular rhythm, normal heart sounds and intact distal pulses.   No murmur heard.  Pulmonary/Chest: Effort normal and breath sounds normal. No stridor. No respiratory distress. He has no wheezes. He has no rales.   Abdominal: Soft. Bowel sounds are normal. He exhibits no distension. There is no tenderness. There is no guarding.   obese   Genitourinary:   Genitourinary Comments: Check urine   Musculoskeletal: Normal range of motion. He exhibits no edema or deformity.   Partial left foot amp  Lower right leg amp     Neurological: He is alert and oriented to person, place, and time. No cranial nerve deficit.   Skin: Skin is warm and dry. Capillary refill takes 2 to  3 seconds. No rash noted. He is not diaphoretic. There is pallor.   Psychiatric: He has a normal mood and affect. His behavior is normal. Judgment and thought content normal.   Nursing note and vitals reviewed.        CRANIAL NERVES     CN III, IV, VI   Pupils are equal, round, and reactive to light.  Extraocular motions are normal.        Significant Labs: All pertinent labs within the past 24 hours have been reviewed.  Results for orders placed or performed during the hospital encounter of 07/21/19   CBC auto differential   Result Value Ref Range    WBC 14.60 (H) 3.90 - 12.70 K/uL    RBC 4.99 4.60 - 6.20 M/uL    Hemoglobin 15.0 14.0 - 18.0 g/dL    Hematocrit 44.8 40.0 - 54.0 %    Mean Corpuscular Volume 90 82 - 98 fL    Mean Corpuscular Hemoglobin 30.1 27.0 - 31.0 pg    Mean Corpuscular Hemoglobin Conc 33.5 32.0 - 36.0 g/dL    RDW 13.8 11.5 - 14.5 %    Platelets 254 150 - 350 K/uL    MPV 9.5 9.2 - 12.9 fL    Gran # (ANC) 9.9 (H) 1.8 - 7.7 K/uL    Lymph # 2.6 1.0 - 4.8 K/uL    Mono # 2.1 (H) 0.3 - 1.0 K/uL    Eos # 0.0 0.0 - 0.5 K/uL    Baso # 0.02 0.00 - 0.20 K/uL    Gran% 68.2 38.0 - 73.0 %    Lymph% 17.5 (L) 18.0 - 48.0 %    Mono% 14.3 4.0 - 15.0 %    Eosinophil% 0.3 0.0 - 8.0 %    Basophil% 0.1 0.0 - 1.9 %    Differential Method Automated    Comprehensive metabolic panel   Result Value Ref Range    Sodium 133 (L) 136 - 145 mmol/L    Potassium 4.3 3.5 - 5.1 mmol/L    Chloride 101 95 - 110 mmol/L    CO2 18 (L) 23 - 29 mmol/L    Glucose 190 (H) 70 - 110 mg/dL    BUN, Bld 21 8 - 23 mg/dL    Creatinine 1.6 (H) 0.5 - 1.4 mg/dL    Calcium 9.3 8.7 - 10.5 mg/dL    Total Protein 6.7 6.0 - 8.4 g/dL    Albumin 2.8 (L) 3.5 - 5.2 g/dL    Total Bilirubin 0.9 0.1 - 1.0 mg/dL    Alkaline Phosphatase 92 55 - 135 U/L    AST 25 10 - 40 U/L    ALT 12 10 - 44 U/L    Anion Gap 14 8 - 16 mmol/L    eGFR if African American 51 (A) >60 mL/min/1.73 m^2    eGFR if non African American 45 (A) >60 mL/min/1.73 m^2   Protime-INR   Result Value  Ref Range    Prothrombin Time 11.9 9.0 - 12.5 sec    INR 1.1 0.8 - 1.2   Troponin I   Result Value Ref Range    Troponin I 12.445 (H) 0.000 - 0.026 ng/mL   CK-MB   Result Value Ref Range    CPK 66 20 - 200 U/L    CPK MB 8.9 (H) 0.1 - 6.5 ng/mL    MB% 13.5 (H) 0.0 - 5.0 %   CK   Result Value Ref Range    CPK 66 20 - 200 U/L   Cath lab procedure   Result Value Ref Range    Coronary Stenosis >= 50% (A)     Peripheral Stenosis >= 50% (A)    ISTAT ACT-K   Result Value Ref Range    POC ACTIVATED CLOTTING TIME K 169 (H) 74 - 137 sec    Sample unknown    ISTAT ACT-K   Result Value Ref Range    POC ACTIVATED CLOTTING TIME K 202 (H) 74 - 137 sec    Sample unknown      *Note: Due to a large number of results and/or encounters for the requested time period, some results have not been displayed. A complete set of results can be found in Results Review.       Significant Imaging: I have reviewed all pertinent imaging results/findings within the past 24 hours.   Imaging Results          IR Heart Cath Images (Final result)  Result time 07/21/19 04:46:24    Final result by Kayleigh Membreno RN (07/21/19 04:46:24)                 Narrative:    See OP Notes for results.     IMPRESSION: See OP Notes for results.             This procedure was auto-finalized by: Virtual Radiologist                                    Assessment/Plan:     * Acute ST elevation myocardial infarction (STEMI) of anterolateral wall  Status post catheterization.  Admitted to ICU  Post catheterization care, Cardiology managing  Consult EICU for assistance in management  Further evaluation/diagnostics/interventions/consults pending course       PAD (peripheral artery disease)  Plavix, statin, aspirin  Cardiology managing  Further evaluation/diagnostics/interventions/consults pending course         CAD in native artery  Beta-blocker, statin, Plavix, aspirin, blood pressure control  Cardiology managing  Further evaluation/diagnostics/interventions/consults pending  course       Stage 3 chronic kidney disease  Stable monitor  Further evaluation/diagnostics/interventions/consults pending course         Type 2 diabetes mellitus with diabetic neuropathy, with long-term current use of insulin  ssi  Check a1c, lipid, tsh  Monitor  Further evaluation/diagnostics/interventions/consults pending course         Essential hypertension  Monitor blood pressure  CCB, beta-blocker  Further evaluation/diagnostics/interventions/consults pending course       VTE Risk Mitigation (From admission, onward)    Trevor/scd        **Portions of this note has been dictated using speech recognition software, M*Modal Fluency Direct; although, time has been taken to proof read and revise it may still contain misspellings, grammatical and or other errors.**      Critical care time spent on the evaluation and treatment of severe organ dysfunction, review of pertinent labs and imaging studies, discussions with consulting providers and discussions with patient/family: 35 minutes.     Ryan Rae NP  Department of Hospital Medicine   [facility]

## 2019-07-21 NOTE — ED PROVIDER NOTES
SCRIBE #1 NOTE: I, Genny Cuellar, am scribing for, and in the presence of, Marshall Scott Jr., MD. I have scribed the entire note.      History      Chief Complaint   Patient presents with    Chest Pain     Pt c/o chest pain since 6 pm, 324 ASA, 300 mg plavix, 5000 units of heparin recieved in route. Denies pain at this time        Review of patient's allergies indicates:  No Known Allergies     HPI   HPI    2019, 2:50 AM   History obtained from the EMS and patient      History of Present Illness: Lavelle Ladd is a 63 y.o. male patient with a PMHx of CHF, Diastolic heart failure, HLD, and HTN who presents to the Emergency Department for evaluation of chest pain/STEMI which onset gradually PTA. Symptoms are constant and moderate in severity. EMS reports the patient began c/o L sided chest pain around 1800. No mitigating or exacerbating factors reported. Patient is c/o nausea, weakness, and dizziness. Patient denies any fever, chills, emesis, diarrhea, leg swelling, and all other sxs at this time. Prior Tx includes ASA, NTG, Plavix 300mg, and Heparin 5000 en route. Patient states his chest pain has improved after administered medications. Denies being on any blood thinners. No further complaints or concerns at this time.         Arrival mode: Ambulance Service    PCP: Rishabh Esteban MD       Past Medical History:  Past Medical History:   Diagnosis Date    DINORAH (acute kidney injury) 2016    Arthritis     CAD in native artery 2019    CHF (congestive heart failure)     Chronic obstructive pulmonary disease 2016    Coronary artery disease involving native coronary artery of native heart without angina pectoris 2016    CRI (chronic renal insufficiency) 2019     donor kidney transplant for DM 16     Induction with Thymo x3 and IV solumedrol to total 875mg  Kidney Biopsy  2016: 16 glomeruli, ACR type 1 AVR type 2, significant microcirculatory changes, c4d negative, No  DSA, 5 to10% fibrosis. Treated with thymo x8 6/21/2016- no rejection      Diastolic heart failure     Encounter for blood transfusion     ESRD on RRT since 10/2013 10/29/2013    Biopsy proven diabetic nephropathy and lymphoplasmacytic interstitial infiltrate not c/w with AIN (ddx sjogrens or assoc with tamm-horsefall protein extravasation)     GERD (gastroesophageal reflux disease)     History of hepatitis C, s/p successful Rx w/ SVR12 - 4/2017 4/5/2017    Completed 12 weeks harvoni w/ SVR    Hyperlipidemia     Hypertension     PAD (peripheral artery disease) 7/21/2019    PIC line (peripherally inserted central catheter) flush     Prophylactic immunotherapy     Proteinuria     PVD (peripheral vascular disease) 6/26/2017    RLE BKA CT 12/11/16 Extensive atherosclerotic disease of the aorta and mesenteric arteries.     Renal hypertension     Type 2 diabetes mellitus with diabetic neuropathy, with long-term current use of insulin 12/1/2016    Vitamin B12 deficiency        Past Surgical History:  Past Surgical History:   Procedure Laterality Date    AMPUTATION, FOOT, TRANSMETATARSAL Left 11/5/2018    Performed by Karla Wheeler D[facility] at [facility] OR    AMPUTATION, FOOT, TRANSMETATARSAL Left 10/31/2018    Performed by Karla Wheeler D[facility] at [facility] OR    AMPUTATION, FOOT, TRANSMETATARSAL Left 9/21/2018    Performed by Karla Wheeler [facility] at [facility] OR    av bovine graft      Left UE    AV FISTULA PLACEMENT      left UE    BIOPSY Tranplanted Kidney N/A 8/15/2016    Performed by Paulette Hanna MD at [facility] OR 2ND FLR    BIOPSY, LIVER, TRANSJUGULAR APPROACH N/A 4/24/2014    Performed by [facility] Provider at [facility] CATH LAB    CARDIAC CATHETERIZATION  02/2015    CLOSURE, WOUND Left 11/5/2018    Performed by Karla Wheeler D[facility] at [facility] OR    CLOSURE, WOUND Left 9/24/2018    Performed by Karla Wheeler D[facility] at [facility] OR    DEBRIDEMENT, METATARSAL BONE, 2 OR MORE BONES Left 11/5/2018     [facility] by [facility] OR    INSERTION, CATHETER, VASCULAR N/A 10/31/2013    Performed by Ralph Mckeon MD at [facility] CATH LAB    IRRIGATION AND DEBRIDEMENT Left 7/9/2018    Performed by Katerina Magaña DPM at [facility] OR    IRRIGATION AND DEBRIDEMENT, LOWER EXTREMITY Left 10/31/2018    Performed by Karla Wheeler D[facility] at [facility] OR    KIDNEY TRANSPLANT  05/21/2016    LEFT LEG ANGIOGRAM N/A 6/14/2018    Performed by Donal Mcdonald MD at [facility] CATH LAB    LEG AMPUTATION THROUGH KNEE  2011    right LE, started as nail puncture leading to diabetic ulcer    LENGTHENING, TENDON, ACHILLES Left 9/24/2018    Performed by Karla Wheeler D[facility] at [facility] OR    TRANSPLANT-KIDNEY Right 5/21/2016    Performed [facility] at [facility] FLR         Family History:  Family History   Problem Relation Age of Onset    Cancer Father     Diabetes Father     Heart failure Father     Stroke Father     Heart failure Mother     Kidney disease Neg Hx        Social History:  Social History     Tobacco Use    Smoking status: Never Smoker    Smokeless tobacco: Never Used   Substance and Sexual Activity    Alcohol use: Yes     Alcohol/week: 3.6 oz     Types: 6 Cans of beer per week     Comment: seldom    Drug use: No    Sexual activity: Never       ROS   Review of Systems   Constitutional: Negative for chills and fever.   HENT: Negative for sore throat.    Respiratory: Negative for shortness of breath.    Cardiovascular: Positive for chest pain. Negative for leg swelling.   Gastrointestinal: Negative for diarrhea, nausea and vomiting.   Genitourinary: Negative for dysuria.   Musculoskeletal: Negative for back pain.   Skin: Negative for rash.   Neurological: Negative for weakness.   Hematological: Does not bruise/bleed easily.   All other systems reviewed and are negative.    Physical Exam      Initial Vitals   BP Pulse Resp Temp SpO2   07/21/19 0246 07/21/19 0236 07/21/19 0244 07/21/19 0246  07/21/19 0244   (!) 177/95 99 (!) 23 97.5 °F (36.4 °C) 99 %      MAP       --                 Physical Exam  Nursing Notes and Vital Signs Reviewed.  Constitutional: Patient is in no acute distress. Well-developed and well-nourished. Patient is diaphoretic.  Head: Atraumatic. Normocephalic.  Eyes: PERRL. EOM intact. Conjunctivae are not pale. No scleral icterus.  ENT: Mucous membranes are moist. Oropharynx is clear and symmetric.    Neck: Supple. Full ROM. No lymphadenopathy.  Cardiovascular: Tachycardic. No murmurs, rubs, or gallops. Distal pulses are 2+ and symmetric.  Pulmonary/Chest: No respiratory distress. Clear to auscultation bilaterally. No wheezing or rales.  Abdominal: Soft and non-distended.  There is no tenderness.  No rebound, guarding, or rigidity. Good bowel sounds.  Genitourinary: No CVA tenderness  Musculoskeletal: Moves all extremities. No obvious deformities. No edema. No calf tenderness.  Skin: Warm and dry.  Neurological:  Alert, awake, and appropriate.  Normal speech.  No acute focal neurological deficits are appreciated.  Psychiatric: Normal affect. Good eye contact. Appropriate in content.    ED Course    Critical Care  Date/Time: 8/18/2019 8:09 AM  Performed by: Marshall Scott Jr., MD  Authorized by: Marshall Scott Jr., MD   Direct patient critical care time: 10 minutes  Additional history critical care time: 10 minutes  Ordering / reviewing critical care time: 10 minutes  Documentation critical care time: 10 minutes  Consulting other physicians critical care time: 10 minutes  Consult with family critical care time: 10 minutes  Total critical care time (exclusive of procedural time) : 60 minutes  Critical care time was exclusive of separately billable procedures and treating other patients and teaching time.  Critical care was necessary to treat or prevent imminent or life-threatening deterioration of the following conditions: cardiac failure.  Critical care was time spent personally by me on  the following activities: development of treatment plan with patient or surrogate, blood draw for specimens, discussions with consultants, interpretation of cardiac output measurements, examination of patient, evaluation of patient's response to treatment, obtaining history from patient or surrogate, ordering and review of laboratory studies, pulse oximetry, review of old charts, re-evaluation of patient's condition, ordering and review of radiographic studies and ordering and performing treatments and interventions.        ED Vital Signs:  Vitals:    07/22/19 1534 07/22/19 1911 07/22/19 2100 07/22/19 2300   BP: 111/79 110/66     Pulse: 83 80 77 74   Resp: 18 17     Temp: 97.9 °F (36.6 °C) 97.1 °F (36.2 °C)     TempSrc: Oral Oral     SpO2: (!) 93% (!) 93%     Weight:       Height:        07/23/19 0007 07/23/19 0100 07/23/19 0300 07/23/19 0458   BP: (!) 162/73   (!) 179/77   Pulse: 73 83 80 80   Resp: 18   20   Temp: 97.8 °F (36.6 °C)   98.7 °F (37.1 °C)   TempSrc: Oral   Oral   SpO2: (!) 94%   (!) 94%   Weight:       Height:        07/23/19 0500 07/23/19 0659 07/23/19 0748 07/23/19 0752   BP:  134/62 (!) 153/72    Pulse: 86 83 83    Resp:   18    Temp:   97 °F (36.1 °C)    TempSrc:   Axillary    SpO2:   96% 95%   Weight:       Height:        07/23/19 0805 07/23/19 0916 07/23/19 1114   BP:      Pulse:  86 84   Resp:      Temp:      TempSrc:      SpO2: 96%     Weight:      Height:          Abnormal Lab Results:  Labs Reviewed - No data to display     All Lab Results:    Imaging Results:  Imaging Results          IR Heart Cath Images (Final result)  Result time 07/21/19 04:46:24    Final result by Kayleigh Membreno RN (07/21/19 04:46:24)                 Narrative:    See OP Notes for results.     IMPRESSION: See OP Notes for results.             This procedure was auto-finalized by: Virtual Radiologist                                      The EKG was ordered, reviewed, and independently interpreted by the ED  provider.  Interpretation time: 2:37  Rate: 92 BPM  Rhythm: Undetermined rhythm  Interpretation: Inferior infarct. Anterolateral injury pattern. ACUTE MI/ STEMI.      The Emergency Provider reviewed the vital signs and test results, which are outlined above.    ED Discussion     3:00 AM: Discussed case with Dr. Valdez (Cardiology). Dr. Valdez agrees with current care and management of pt and accepts admission.   Admitting Service: Cardiology  Admitting Physician: Dr. aVldez  Admit to: Cath lab    3:00 AM: Re-evaluated pt. I have discussed test results, shared treatment plan, and the need for admission with patient and family at bedside. Pt and family express understanding at this time and agree with all information. All questions answered. Pt and family have no further questions or concerns at this time. Pt is ready for admit.      ED Medication(s):  Medications - No data to display    Follow-up Information     [facility] [place] In 1 day.    Specialty:  Home Health Services  Why:  Home Health  Contact information:  [address], [address]  [place] [address]  [phone]             Umang Hannah MD In 1 week.    Specialties:  Cardiology, INTERVENTIONAL CARDIOLOGY  Why:  Hospital follow up. Clinic will call to arrange appt   Contact information:  [address]  [place] [address]  [phone]                     Medical Decision Making    Medical Decision Making:   Clinical Tests:   Medical Tests: Ordered and Reviewed           Scribe Attestation:   Scribe #1: I performed the above scribed service and the documentation accurately describes the services I performed. I attest to the accuracy of the note.    Attending:   Physician Attestation Statement for Scribe #1: I, Marshall Scott Jr., MD, personally performed the services described in this documentation, as scribed by Genny Cuellar, in my presence, and it is both accurate and complete.          Clinical Impression        ICD-10-CM ICD-9-CM   1. Chest pain R07.9 786.50   2. STEMI (ST elevation myocardial infarction) I21.3 410.90   3. Hypertensive chronic kidney disease with stage 1 through stage 4 chronic kidney disease, or unspecified chronic kidney disease I12.9 403.90   4. Acute ST elevation myocardial infarction (STEMI) of anterolateral wall I21.09 410.10       Disposition:   Disposition: Admitted (Cath lab)  Condition: Serious         Marshall Scott Jr., MD  07/23/19 2039       Marshall Scott Jr., MD  08/18/19 0809

## 2019-07-21 NOTE — ASSESSMENT & PLAN NOTE
ssi  Check a1c, lipid, tsh  Monitor  Further evaluation/diagnostics/interventions/consults pending course

## 2019-07-21 NOTE — HPI
63-year-old male with significant medical comorbidities presented this evening with chest pain. Patient was evaluated in emergency room and classified as a STEMI was taken to the cath lab by Cardiology.  Patient is status post heart catheterization.  Hospital Medicine consulted for assistance with medical management

## 2019-07-21 NOTE — PROGRESS NOTES
eICU Brief Admission Note       Briefly, 63-year-old male with significant medical comorbidities presented this evening with chest pain. Patient was evaluated in emergency room and classified as a STEMI was taken to the cath lab by Cardiology.  Patient is status post heart catheterization.        Cath finding   1.   Apical LAD occlusion.   2.   Normal LVEF.   3.  Severe R LE PAD.      Video assessment :  Lying comfortably in bed, denies chest pain or SOB     Vitals reviewed   Afebrile, stable vitals     LABs reviewed       Radiology reviewed         Assessment / Plan :  # Aterolateral wall STEMI s/p cath   # PAD   # CKD  # DM   # HTN   - on ASA, Plavix , statin , BB   - also on Prednisone 5 mg OD  - CXR        DVT Px : Heparin SQ / Lovenox SQ once OK from Cardiology   GI Px : N/A      Patient seen over video : Yes   Chart reviewed : Yes  Spoke with RN : Yes

## 2019-07-21 NOTE — OP NOTE
[facility] -   Cardiac Catheterization  Procedure Note    SUMMARY     Lavelle Ladd  [id number]  Rishabh Esteban MD    Date of Procedure: 7/21/2019    Procedure:  1. LHC 2. LEFT VENTRICULOGRAM  3. CORONARY ANGIOGRAM  4. RIGHT LEG ANGIOGRAM    Provider: Andrew Valdez MD    Assisting Provider:  Tomasz Baker    Indications: He was referred for cardiac catheterization to further evaluate STEMI.    Pre-Procedure Diagnosis:  Acute STEMI    Post-Procedure Diagnosis:  Occluded apical LAD    Anesthesia: Choice    Description of the Findings of the Procedure:     The risks, benefits, complications, treatment options, and expected outcomes were discussed with the patient. The patient and/or family concurred with the proposed plan, giving informed consent.     FINDINGS:  APICAL LAD IS OCCLUDED -- ADVISE MEDICAL MGT.  DIFFUSE NONOBSTRUCTIVE CAD ELSEWHERE  LVEF 60%  RIGHT SFA HEAVILY CALCIFIED/OCCLUDED.  RIGHT FEMORAL SHEATH MANUAL REMOVAL  SEE REPORT    Complications: None; patient tolerated the procedure well.    Estimated Blood Loss (EBL): Minimal           Implants: NONE    Specimens: NONE    Condition: stable    Disposition: PACU - hemodynamically stable.    Attestation: I was present and scrubbed for the entire procedure.     RECOMMENDATIONS:    USUAL  POST CATH CARE  OPTIMAL MEDICAL MGT ADVISED FOR CAD  RISK FACTOR MODIFICATION  ADMIT TO ICU  ASA  PLAVIX  STATIN  BETA BLOCKER

## 2019-07-21 NOTE — PROGRESS NOTES
Transferred to telemetry room 214 via wheelchair. Tele monitor applied prior to transfer. Up to bed with nurses Rachel and Shruti in room. Report given at bedside. All personal belongings left at bedside

## 2019-07-21 NOTE — SUBJECTIVE & OBJECTIVE
Past Medical History:   Diagnosis Date    DINORAH (acute kidney injury) 2016    Arthritis     CAD in native artery 2019    CHF (congestive heart failure)     Chronic obstructive pulmonary disease 2016    Coronary artery disease involving native coronary artery of native heart without angina pectoris 2016    CRI (chronic renal insufficiency) 2019     donor kidney transplant for DM 16     Induction with Thymo x3 and IV solumedrol to total 875mg  Kidney Biopsy  2016: 16 glomeruli, ACR type 1 AVR type 2, significant microcirculatory changes, c4d negative, No DSA, 5 to10% fibrosis. Treated with thymo x8 2016- no rejection      Diastolic heart failure     Encounter for blood transfusion     ESRD on RRT since 10/2013 10/29/2013    Biopsy proven diabetic nephropathy and lymphoplasmacytic interstitial infiltrate not c/w with AIN (ddx sjogrens or assoc with tamm-horsefall protein extravasation)     GERD (gastroesophageal reflux disease)     History of hepatitis C, s/p successful Rx w/ SVR12 - 2017    Completed 12 weeks harvoni w/ SVR    Hyperlipidemia     Hypertension     PAD (peripheral artery disease) 2019    PIC line (peripherally inserted central catheter) flush     Prophylactic immunotherapy     Proteinuria     PVD (peripheral vascular disease) 2017    RLE BKA CT 16 Extensive atherosclerotic disease of the aorta and mesenteric arteries.     Renal hypertension     Type 2 diabetes mellitus with diabetic neuropathy, with long-term current use of insulin 2016    Vitamin B12 deficiency        Past Surgical History:   Procedure Laterality Date    AMPUTATION, FOOT, TRANSMETATARSAL Left 2018    Performed by Karla Wheeler DPM at [facility] OR    AMPUTATION, FOOT, TRANSMETATARSAL Left 10/31/2018    Performed by Karla Wheeler DPM at [facility] OR    AMPUTATION, FOOT, TRANSMETATARSAL Left 2018    Performed by Karla WOODY  LASHONDA Wheeler at [facility] OR    av bovine graft      Left UE    AV FISTULA PLACEMENT      left UE    BIOPSY Tranplanted Kidney N/A 8/15/2016    Performed by Paulette Hanna MD at [facility] OR 2ND FLR    BIOPSY, LIVER, TRANSJUGULAR APPROACH N/A 4/24/2014    Performed by [facility] at [facility] CATH LAB    CARDIAC CATHETERIZATION  02/2015    CLOSURE, WOUND Left 11/5/2018    Performed by Karla Wheeler DPM at [facility] OR    CLOSURE, WOUND Left 9/24/2018    Performed by Karla Wheeler DPM at [facility] OR    DEBRIDEMENT, METATARSAL BONE, 2 OR MORE BONES Left 11/5/2018    Performed by Karla Wheeler DPM at [facility] OR    INSERTION, CATHETER, VASCULAR N/A 10/31/2013    Performed by Ralph Mckeon MD at [facility] CATH LAB    IRRIGATION AND DEBRIDEMENT Left 7/9/2018    Performed by Katerina Magaña DPM at [facility] OR    IRRIGATION AND DEBRIDEMENT, LOWER EXTREMITY Left 10/31/2018    Performed by Karla Wheeler DPM at [facility] OR    KIDNEY TRANSPLANT  05/21/2016    LEFT LEG ANGIOGRAM N/A 6/14/2018    Performed by Donal Mcdonald MD at [facility] CATH LAB    LEG AMPUTATION THROUGH KNEE  2011    right LE, started as nail puncture leading to diabetic ulcer    LENGTHENING, TENDON, ACHILLES Left 9/24/2018    Performed by Karla Wheeler DPM at [facility] OR    TRANSPLANT-KIDNEY Right 5/21/2016    [facility]       Review of patient's allergies indicates:  No Known Allergies    No current facility-administered medications on file prior to encounter.      Current Outpatient Medications on File Prior to Encounter   Medication Sig    amLODIPine (NORVASC) 10 MG tablet Take 1 tablet (10 mg total) by mouth once daily.    aspirin (ECOTRIN) 81 MG EC tablet Take 1 tablet (81 mg total) by mouth once daily.    BD INSULIN SYRINGE ULTRA-FINE 1 mL 31 gauge x 5/16 Syrg USE ONE AS DIRECTED FOUR TIMES DAILY WITH MEALS AND AT BEDTIME    blood sugar diagnostic Strp 1 each by Misc.(Non-Drug; Combo Route)  "route 3 (three) times daily.    blood-glucose meter kit Use as instructed    budesonide-formoterol 160-4.5 mcg (SYMBICORT) 160-4.5 mcg/actuation HFAA Inhale 2 puffs into the lungs every 12 (twelve) hours. Wash out mouth after using    ergocalciferol (ERGOCALCIFEROL) 50,000 unit Cap Take 1 capsule (50,000 Units total) by mouth every 7 days. Take on Mondays    flash glucose scanning reader (FREESTYLE BRYAN 14 DAY READER) Misc 1 Device by Misc.(Non-Drug; Combo Route) route as needed.    flash glucose sensor (FREESTYLE BRYAN 14 DAY SENSOR) Kit 1 kit by Misc.(Non-Drug; Combo Route) route every 14 (fourteen) days.    gabapentin (NEURONTIN) 300 MG capsule Take 1 capsule (300 mg total) by mouth 3 (three) times daily. for 10 days    HYDROcodone-acetaminophen (NORCO)  mg per tablet     hydrOXYzine (ATARAX) 50 MG tablet     liraglutide 0.6 mg/0.1 mL, 18 mg/3 mL, subq PNIJ (VICTOZA 2-KOKI) 0.6 mg/0.1 mL (18 mg/3 mL) PnIj Inject 1.8 mg into the skin once daily.    metoprolol tartrate (LOPRESSOR) 25 MG tablet Take 1 tablet (25 mg total) by mouth 2 (two) times daily.    mycophenolate (CELLCEPT) 500 mg Tab Take 1 tablet (500 mg total) by mouth 2 (two) times daily.    ondansetron (ZOFRAN-ODT) 4 MG TbDL Take 1 tablet (4 mg total) by mouth every 8 (eight) hours as needed.    pen needle, diabetic (SURE-FINE PEN NEEDLES) 31 gauge x 3/16" Ndle 1 each by Misc.(Non-Drug; Combo Route) route 4 (four) times daily with meals and nightly.    predniSONE (DELTASONE) 5 MG tablet Take 1 tablet (5 mg total) by mouth once daily.    sodium bicarbonate 650 MG tablet Take 4 tablets (2,600 mg total) by mouth 2 (two) times daily.    tacrolimus (PROGRAF) 0.5 MG Cap Take 3 capsules (1.5 mg total) by mouth every 12 (twelve) hours. Z94.0 Kidney Transplant    TRESIBA FLEXTOUCH U-100 100 unit/mL (3 mL) InPn Inject 30 Units into the skin 2 (two) times daily.     Family History     Problem Relation (Age of Onset)    Cancer Father    Diabetes " Father    Heart failure Father, Mother    Stroke Father        Tobacco Use    Smoking status: Never Smoker    Smokeless tobacco: Never Used   Substance and Sexual Activity    Alcohol use: Yes     Alcohol/week: 3.6 oz     Types: 6 Cans of beer per week     Comment: seldom    Drug use: No    Sexual activity: Never     Review of Systems   Constitutional: Negative for chills, diaphoresis, fatigue and fever.   HENT: Negative for congestion, sore throat and voice change.    Eyes: Negative for photophobia and visual disturbance.   Respiratory: Negative for cough, shortness of breath, wheezing and stridor.    Cardiovascular: Positive for chest pain. Negative for leg swelling.   Gastrointestinal: Negative for abdominal distention, abdominal pain, constipation, diarrhea, nausea and vomiting.   Endocrine: Negative for polydipsia, polyphagia and polyuria.   Genitourinary: Negative for difficulty urinating, dysuria, flank pain, testicular pain and urgency.   Musculoskeletal: Negative for back pain, joint swelling, neck pain and neck stiffness.   Skin: Negative for color change and rash.   Allergic/Immunologic: Negative for immunocompromised state.   Neurological: Negative for dizziness, syncope, weakness, numbness and headaches.   Hematological: Does not bruise/bleed easily.   Psychiatric/Behavioral: Negative for agitation, behavioral problems and confusion.     Objective:     Vital Signs (Most Recent):  Temp: 97.6 °F (36.4 °C) (07/21/19 0436)  Pulse: 96 (07/21/19 0436)  Resp: 16 (07/21/19 0436)  BP: (!) 143/112 (07/21/19 0436)  SpO2: 99 % (07/21/19 0244) Vital Signs (24h Range):  Temp:  [97.5 °F (36.4 °C)-97.6 °F (36.4 °C)] 97.6 °F (36.4 °C)  Pulse:  [96-99] 96  Resp:  [16-23] 16  SpO2:  [99 %] 99 %  BP: (143-177)/() 143/112     Weight: 97.2 kg (214 lb 4.6 oz)  Body mass index is 29.89 kg/m².    Physical Exam   Constitutional: He is oriented to person, place, and time. He appears well-developed. No distress.    Elderly male, appears older than stated age   HENT:   Head: Normocephalic and atraumatic.   Nose: Nose normal.   Eyes: Pupils are equal, round, and reactive to light. Conjunctivae and EOM are normal. No scleral icterus.   Neck: Normal range of motion. Neck supple. No tracheal deviation present.   Cardiovascular: Normal rate, regular rhythm, normal heart sounds and intact distal pulses.   No murmur heard.  Pulmonary/Chest: Effort normal and breath sounds normal. No stridor. No respiratory distress. He has no wheezes. He has no rales.   Abdominal: Soft. Bowel sounds are normal. He exhibits no distension. There is no tenderness. There is no guarding.   obese   Genitourinary:   Genitourinary Comments: Check urine   Musculoskeletal: Normal range of motion. He exhibits no edema or deformity.   Partial left foot amp  Lower right leg amp     Neurological: He is alert and oriented to person, place, and time. No cranial nerve deficit.   Skin: Skin is warm and dry. Capillary refill takes 2 to 3 seconds. No rash noted. He is not diaphoretic. There is pallor.   Psychiatric: He has a normal mood and affect. His behavior is normal. Judgment and thought content normal.   Nursing note and vitals reviewed.        CRANIAL NERVES     CN III, IV, VI   Pupils are equal, round, and reactive to light.  Extraocular motions are normal.        Significant Labs: All pertinent labs within the past 24 hours have been reviewed.  Results for orders placed or performed during the hospital encounter of 07/21/19   CBC auto differential   Result Value Ref Range    WBC 14.60 (H) 3.90 - 12.70 K/uL    RBC 4.99 4.60 - 6.20 M/uL    Hemoglobin 15.0 14.0 - 18.0 g/dL    Hematocrit 44.8 40.0 - 54.0 %    Mean Corpuscular Volume 90 82 - 98 fL    Mean Corpuscular Hemoglobin 30.1 27.0 - 31.0 pg    Mean Corpuscular Hemoglobin Conc 33.5 32.0 - 36.0 g/dL    RDW 13.8 11.5 - 14.5 %    Platelets 254 150 - 350 K/uL    MPV 9.5 9.2 - 12.9 fL    Gran # (ANC) 9.9 (H) 1.8 -  7.7 K/uL    Lymph # 2.6 1.0 - 4.8 K/uL    Mono # 2.1 (H) 0.3 - 1.0 K/uL    Eos # 0.0 0.0 - 0.5 K/uL    Baso # 0.02 0.00 - 0.20 K/uL    Gran% 68.2 38.0 - 73.0 %    Lymph% 17.5 (L) 18.0 - 48.0 %    Mono% 14.3 4.0 - 15.0 %    Eosinophil% 0.3 0.0 - 8.0 %    Basophil% 0.1 0.0 - 1.9 %    Differential Method Automated    Comprehensive metabolic panel   Result Value Ref Range    Sodium 133 (L) 136 - 145 mmol/L    Potassium 4.3 3.5 - 5.1 mmol/L    Chloride 101 95 - 110 mmol/L    CO2 18 (L) 23 - 29 mmol/L    Glucose 190 (H) 70 - 110 mg/dL    BUN, Bld 21 8 - 23 mg/dL    Creatinine 1.6 (H) 0.5 - 1.4 mg/dL    Calcium 9.3 8.7 - 10.5 mg/dL    Total Protein 6.7 6.0 - 8.4 g/dL    Albumin 2.8 (L) 3.5 - 5.2 g/dL    Total Bilirubin 0.9 0.1 - 1.0 mg/dL    Alkaline Phosphatase 92 55 - 135 U/L    AST 25 10 - 40 U/L    ALT 12 10 - 44 U/L    Anion Gap 14 8 - 16 mmol/L    eGFR if African American 51 (A) >60 mL/min/1.73 m^2    eGFR if non African American 45 (A) >60 mL/min/1.73 m^2   Protime-INR   Result Value Ref Range    Prothrombin Time 11.9 9.0 - 12.5 sec    INR 1.1 0.8 - 1.2   Troponin I   Result Value Ref Range    Troponin I 12.445 (H) 0.000 - 0.026 ng/mL   CK-MB   Result Value Ref Range    CPK 66 20 - 200 U/L    CPK MB 8.9 (H) 0.1 - 6.5 ng/mL    MB% 13.5 (H) 0.0 - 5.0 %   CK   Result Value Ref Range    CPK 66 20 - 200 U/L   Cath lab procedure   Result Value Ref Range    Coronary Stenosis >= 50% (A)     Peripheral Stenosis >= 50% (A)    ISTAT ACT-K   Result Value Ref Range    POC ACTIVATED CLOTTING TIME K 169 (H) 74 - 137 sec    Sample unknown    ISTAT ACT-K   Result Value Ref Range    POC ACTIVATED CLOTTING TIME K 202 (H) 74 - 137 sec    Sample unknown      *Note: Due to a large number of results and/or encounters for the requested time period, some results have not been displayed. A complete set of results can be found in Results Review.       Significant Imaging: I have reviewed all pertinent imaging results/findings within the past  24 hours.   Imaging Results          IR Heart Cath Images (Final result)  Result time 07/21/19 04:46:24    Final result by Kayleigh Membreno RN (07/21/19 04:46:24)                 Narrative:    See OP Notes for results.     IMPRESSION: See OP Notes for results.             This procedure was auto-finalized by: Virtual Radiologist

## 2019-07-21 NOTE — ASSESSMENT & PLAN NOTE
Monitor blood pressure  CCB, beta-blocker  Further evaluation/diagnostics/interventions/consults pending course

## 2019-07-21 NOTE — PROGRESS NOTES
[facility] Medicine  Progress Note    Patient Name: Lavelle Ladd  MRN: [id number]  Patient Class: IP- Inpatient   Admission Date: 7/21/2019  Length of Stay: 0 days  Attending Physician: Andrew Valdez MD  Primary Care Provider: Rishabh Esteban MD        Subjective:     Principal Problem:Acute ST elevation myocardial infarction (STEMI) of anterolateral wall      HPI:  63-year-old male with significant medical comorbidities presented this evening with chest pain. Patient was evaluated in emergency room and classified as a STEMI was taken to the cath lab by Cardiology.  Patient is status post heart catheterization.  Hospital Medicine consulted for assistance with medical management    Overview/Hospital Course:  07/21 -STEMI S/P. Recommend maximal medical management for apical LAD occlusion (small tortuous vessel and very distal occlusion. EF 60%   Medical management -ASA/Statin /Plavix for 1 yr      Interval History:     Review of Systems   Constitutional: Positive for activity change.   HENT: Negative.    Eyes: Negative.    Respiratory: Negative.    Cardiovascular: Negative.    Gastrointestinal: Positive for nausea.   Genitourinary: Negative.    Musculoskeletal: Negative.    Skin: Negative.    Allergic/Immunologic: Negative.    Neurological: Negative.    Hematological: Negative.    Psychiatric/Behavioral: Negative.      Objective:     Vital Signs (Most Recent):  Temp: 97.7 °F (36.5 °C) (07/21/19 1505)  Pulse: 94 (07/21/19 1515)  Resp: 14 (07/21/19 1515)  BP: 112/82 (07/21/19 1515)  SpO2: 96 % (07/21/19 1515) Vital Signs (24h Range):  Temp:  [97.5 °F (36.4 °C)-98.1 °F (36.7 °C)] 97.7 °F (36.5 °C)  Pulse:  [] 94  Resp:  [14-27] 14  SpO2:  [96 %-99 %] 96 %  BP: ()/() 112/82     Weight: 97.2 kg (214 lb 4.6 oz)  Body mass index is 29.89 kg/m².    Intake/Output Summary (Last 24 hours) at 7/21/2019 1534  Last data filed at 7/21/2019 1500  Gross per 24 hour   Intake 1067 ml    Output 920 ml   Net 147 ml      Physical Exam   Constitutional: He is oriented to person, place, and time. He appears well-developed and well-nourished. He appears distressed.   HENT:   Head: Normocephalic and atraumatic.   Eyes: Pupils are equal, round, and reactive to light. EOM are normal.   Neck: Normal range of motion. Neck supple.   Cardiovascular: Normal rate and regular rhythm.   Pulmonary/Chest: Effort normal and breath sounds normal.   Abdominal: Soft. Bowel sounds are normal.   Neurological: He is alert and oriented to person, place, and time.   Skin: Skin is warm and dry.   Psychiatric: He has a normal mood and affect. His behavior is normal. Judgment and thought content normal.       Significant Labs:   BMP:   Recent Labs   Lab 19  0515   *  170*   *  130*   K 4.8  4.8     104   CO2 14*  14*   BUN 22  22   CREATININE 1.4  1.4   CALCIUM 9.0  9.0   MG 1.7     CBC:   Recent Labs   Lab 19  0251 19  0515   WBC 14.60* 12.35   HGB 15.0 15.3   HCT 44.8 45.4    253       Significant Imaging: I have reviewed all pertinent imaging results/findings within the past 24 hours.      Assessment/Plan:      * Acute ST elevation myocardial infarction (STEMI) of anterolateral wall  Status post catheterization.      medical management for apical LAD occlusion (small tortuous vessel and very distal occlusion  ASA/Plavix for one year    PAD (peripheral artery disease)  Plavix, statin, aspirin      CAD in native artery  Beta-blocker, statin, Plavix, aspirin, blood pressure control  Cardiology managing  Further evaluation/diagnostics/interventions/consults pending course       Stage 3 chronic kidney disease  Stable monitor          Type 2 diabetes mellitus with diabetic neuropathy, with long-term current use of insulin  SSI/ Accu-check       donor kidney transplant for DM 16    Continue immunosuppressants     Essential hypertension  Monitor blood pressure  CCB,  beta-blocker  Further evaluation/diagnostics/interventions/consults pending course         VTE Risk Mitigation (From admission, onward)        Ordered     heparin (porcine) injection 5,000 Units  Every 8 hours      07/21/19 0738     Place sequential compression device  Until discontinued      07/21/19 0502          Critical care time spent on the evaluation and treatment of severe organ dysfunction, review of pertinent labs and imaging studies, discussions with consulting providers and discussions with patient/family: 40minutes.      Gabi Mathias MD  Department of Hospital Medicine   [facility]

## 2019-07-21 NOTE — PLAN OF CARE
Met with patient. He lives alone.  He has a girlfriend and his sister for any additional help.  Patient is independent with adls and iadls with assistive devices. 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  He does have a left leg prothesis.   Patient uses an electric wheelchair as well as a standard walker to get around.  States he sometimes uses a straight cane for ambulation.  Patient has had home health services in the past; grater than 6 months ago.  He had a kidney transplant in 2016.  Patient denies any post hospital needs or services at this time.  Patient does not have computer or Internet access to utilize [facility]. He does not have an Advance Directive/Living will.  He request bedside delivery of discharge medications.     Updated white board with 's name and number. Transitional Care Folder, Discharge Planning Begins on Admission pamphlet, [facility] Bedside Delivery pamphlet, Advance Directive information given to patient along with the contact information given.Instructed patient or family to call with any questions or concerns.        [id number] [place] - [address] [facility]  [address] [facility] [address]  Phone: [phone] Fax: [phone]    [id number] - [place] - [address] [address] [facility]  [address] [facility] [address] [address]  Phone: [phone] Fax: [phone]    [facility], [facility] - [place], [place] - [address]  [address]  [facility] [address]  Phone: [phone] Fax: [phone]    [facility] - [place], [place] - [address] [facility] [address]  [address] [facility] [address]  Phone: [phone] Fax: [phone]    Rishabh Esteban MD  Payor: HU[employer] MANAGED MEDICARE / Plan: HU[employer] TOTAL CARE ADVANTAGE / Product Type: Medicare Advantage /         07/21/19 1033   Discharge Assessment   Assessment Type Discharge Planning Assessment   Confirmed/corrected address and  phone number on facesheet? Yes   Assessment information obtained from? Patient   Expected Length of Stay (days)   (TBD)   Communicated expected length of stay with patient/caregiver yes   Prior to hospitilization cognitive status: Alert/Oriented   Prior to hospitalization functional status: Assistive Equipment   Current cognitive status: Alert/Oriented   Current Functional Status: Assistive Equipment   Facility Arrived From: home   Lives With alone   Able to Return to Prior Arrangements yes   Is patient able to care for self after discharge? Yes   Who are your caregiver(s) and their phone number(s)? Kecia Sagastume, sister [phone]   Patient's perception of discharge disposition home or selfcare   Readmission Within the Last 30 Days no previous admission in last 30 days   Patient currently being followed by outpatient case management? No   Patient currently receives home health services? No   Patient currently receives any other outside agency services? No   Equipment Currently Used at Home wheelchair;walker, standard;bath bench;glucometer  (Glucometer:  Freestyle Sunita continuous glucose moniter)   Do you have any problems affording any of your prescribed medications? Yes   If yes, what medications? pain medication, could not remember the name   Is the patient taking medications as prescribed? yes   Does the patient have transportation home? Yes   Transportation Anticipated family or friend will provide   Does the patient receive services at the [facility]? No   Discharge Plan A Home   Discharge Plan B Home Health   DME Needed Upon Discharge  none   Patient/Family in Agreement with Plan yes

## 2019-07-21 NOTE — H&P
[facility] -   Cardiology  History and Physical     Patient Name: Lavelle Ladd  MRN: [id number]  Admission Date: 2019  Code Status: Prior   Attending Provider: No att. providers found   Primary Care Physician: Rishabh Esteban MD  Principal Problem:Acute ST elevation myocardial infarction (STEMI) of anterolateral wall    Patient information was obtained from patient, past medical records and ER records.     Subjective:     Chief Complaint:  Chest pain     HPI:  Pt presents with cp and MI.  Pt has h/o CRI, HTN, PAD, CAD.  S/p [facility]  w Dr. Hannah, 50% prox-mid LAD stenosis with med mgt advised.  Now presents by EMS with c/o chest pain over last few hours.  ecg showed NSR, acute anterolateral and inferior ST elevation.  Code STEMI activated.    Past Medical History:   Diagnosis Date    DINORAH (acute kidney injury) 2016    Arthritis     CAD in native artery 2019    CHF (congestive heart failure)     Chronic obstructive pulmonary disease 2016    Coronary artery disease involving native coronary artery of native heart without angina pectoris 2016    CRI (chronic renal insufficiency) 2019     donor kidney transplant for DM 16     Induction with Thymo x3 and IV solumedrol to total 875mg  Kidney Biopsy  2016: 16 glomeruli, ACR type 1 AVR type 2, significant microcirculatory changes, c4d negative, No DSA, 5 to10% fibrosis. Treated with thymo x8 2016- no rejection      Diastolic heart failure     Encounter for blood transfusion     ESRD on RRT since 10/2013 10/29/2013    Biopsy proven diabetic nephropathy and lymphoplasmacytic interstitial infiltrate not c/w with AIN (ddx sjogrens or assoc with tamm-horsefall protein extravasation)     GERD (gastroesophageal reflux disease)     History of hepatitis C, s/p successful Rx w/ SVR12 - 2017    Completed 12 weeks harvoni w/ SVR    Hyperlipidemia     Hypertension     PAD (peripheral artery  disease) 7/21/2019    PIC line (peripherally inserted central catheter) flush     Prophylactic immunotherapy     Proteinuria     PVD (peripheral vascular disease) 6/26/2017    RLE BKA CT 12/11/16 Extensive atherosclerotic disease of the aorta and mesenteric arteries.     Renal hypertension     Type 2 diabetes mellitus with diabetic neuropathy, with long-term current use of insulin 12/1/2016    Vitamin B12 deficiency        Past Surgical History:   Procedure Laterality Date    AMPUTATION, FOOT, TRANSMETATARSAL Left 11/5/2018    Performed by Karla Wheeler DPM at [facility] OR    AMPUTATION, FOOT, TRANSMETATARSAL Left 10/31/2018    Performed by Karla Wheeler DPM at [facility] OR    AMPUTATION, FOOT, TRANSMETATARSAL Left 9/21/2018    Performed by Karla Wheeler DPM at [facility] OR    av bovine graft      Left UE    AV FISTULA PLACEMENT      left UE    BIOPSY Tranplanted Kidney N/A 8/15/2016    Performed by [facility] at [facility] OR 2ND FLR    BIOPSY, LIVER, TRANSJUGULAR APPROACH N/A 4/24/2014    Performed by [facility] Diagnostic Provider at [facility] CATH LAB    CARDIAC CATHETERIZATION  02/2015    CLOSURE, WOUND Left 11/5/2018    Performed by Karla Wheeler DPM at [facility] OR    CLOSURE, WOUND Left 9/24/2018    Performed by Karla Wheeler DPM at [facility] OR    DEBRIDEMENT, METATARSAL BONE, 2 OR MORE BONES Left 11/5/2018    Performed by Karla Wheeler DPM at [facility] OR    INSERTION, CATHETER, VASCULAR N/A 10/31/2013    Performed by Ralph Mckeon MD at [facility] CATH LAB    IRRIGATION AND DEBRIDEMENT Left 7/9/2018    Performed by Katerina Magaña DPM at [facility] OR    IRRIGATION AND DEBRIDEMENT, LOWER EXTREMITY Left 10/31/2018    Performed by Karla Wheeler DPM at [facility] OR    KIDNEY TRANSPLANT  05/21/2016    LEFT LEG ANGIOGRAM N/A 6/14/2018    Performed by Donal Mcdonald MD at [facility] CATH LAB    LEG AMPUTATION THROUGH KNEE  2011    right LE, started as nail puncture leading to diabetic ulcer     LENGTHENING, TENDON, ACHILLES Left 9/24/2018    Performed by [facility] at [facility] OR    TRANSPLANT-KIDNEY Right 5/21/2016    Performed by Ronny Bergeron MD at [facility] OR [address] [place] [address]       Review of patient's allergies indicates:  No Known Allergies    No current facility-administered medications on file prior to encounter.      Current Outpatient Medications on File Prior to Encounter   Medication Sig    amLODIPine (NORVASC) 10 MG tablet Take 1 tablet (10 mg total) by mouth once daily.    aspirin (ECOTRIN) 81 MG EC tablet Take 1 tablet (81 mg total) by mouth once daily.    BD INSULIN SYRINGE ULTRA-FINE 1 mL 31 gauge x 5/16 Syrg USE ONE AS DIRECTED FOUR TIMES DAILY WITH MEALS AND AT BEDTIME    blood sugar diagnostic Strp 1 each by Misc.(Non-Drug; Combo Route) route 3 (three) times daily.    blood-glucose meter kit Use as instructed    budesonide-formoterol 160-4.5 mcg (SYMBICORT) 160-4.5 mcg/actuation HFAA Inhale 2 puffs into the lungs every 12 (twelve) hours. Wash out mouth after using    ergocalciferol (ERGOCALCIFEROL) 50,000 unit Cap Take 1 capsule (50,000 Units total) by mouth every 7 days. Take on Mondays    flash glucose scanning reader (FREESTYLE BRYAN 14 DAY READER) Misc 1 Device by Misc.(Non-Drug; Combo Route) route as needed.    flash glucose sensor (FREESTYLE BRYAN 14 DAY SENSOR) Kit 1 kit by Misc.(Non-Drug; Combo Route) route every 14 (fourteen) days.    gabapentin (NEURONTIN) 300 MG capsule Take 1 capsule (300 mg total) by mouth 3 (three) times daily. for 10 days    HYDROcodone-acetaminophen (NORCO)  mg per tablet     hydrOXYzine (ATARAX) 50 MG tablet     liraglutide 0.6 mg/0.1 mL, 18 mg/3 mL, subq PNIJ (VICTOZA 2-KOKI) 0.6 mg/0.1 mL (18 mg/3 mL) PnIj Inject 1.8 mg into the skin once daily.    metoprolol tartrate (LOPRESSOR) 25 MG tablet Take 1 tablet (25 mg total) by mouth 2 (two) times daily.    mycophenolate (CELLCEPT) 500 mg Tab Take 1 tablet (500 mg total) by mouth 2 (two)  "times daily.    ondansetron (ZOFRAN-ODT) 4 MG TbDL Take 1 tablet (4 mg total) by mouth every 8 (eight) hours as needed.    pen needle, diabetic (SURE-FINE PEN NEEDLES) 31 gauge x 3/16" Ndle 1 each by Misc.(Non-Drug; Combo Route) route 4 (four) times daily with meals and nightly.    predniSONE (DELTASONE) 5 MG tablet Take 1 tablet (5 mg total) by mouth once daily.    sodium bicarbonate 650 MG tablet Take 4 tablets (2,600 mg total) by mouth 2 (two) times daily.    tacrolimus (PROGRAF) 0.5 MG Cap Take 3 capsules (1.5 mg total) by mouth every 12 (twelve) hours. Z94.0 Kidney Transplant    TRESIBA FLEXTOUCH U-100 100 unit/mL (3 mL) InPn Inject 30 Units into the skin 2 (two) times daily.     Family History     Problem Relation (Age of Onset)    Cancer Father    Diabetes Father    Heart failure Father, Mother    Stroke Father        Tobacco Use    Smoking status: Never Smoker    Smokeless tobacco: Never Used   Substance and Sexual Activity    Alcohol use: Yes     Alcohol/week: 3.6 oz     Types: 6 Cans of beer per week     Comment: seldom    Drug use: No    Sexual activity: Never     Review of Systems   Constitution: Negative.   HENT: Negative.    Eyes: Negative.    Cardiovascular: Positive for chest pain and claudication.   Respiratory: Negative.    Endocrine: Negative.    Hematologic/Lymphatic: Negative.    Skin: Negative.    Musculoskeletal: Positive for arthritis.   Gastrointestinal: Negative.    Genitourinary: Negative.    Neurological: Negative.    Psychiatric/Behavioral: Negative.    Allergic/Immunologic: Negative.      Objective:     Vital Signs (Most Recent):  Temp: 97.5 °F (36.4 °C) (07/21/19 0246)  Pulse: 99 (07/21/19 0244)  Resp: (!) 23 (07/21/19 0244)  BP: (!) 177/95 (07/21/19 0246)  SpO2: 99 % (07/21/19 0244) Vital Signs (24h Range):  Temp:  [97.5 °F (36.4 °C)] 97.5 °F (36.4 °C)  Pulse:  [99] 99  Resp:  [23] 23  SpO2:  [99 %] 99 %  BP: (177)/(95) 177/95        There is no height or weight on file to " calculate BMI.    SpO2: 99 %  O2 Device (Oxygen Therapy): nasal cannula    No intake or output data in the 24 hours ending 07/21/19 0356    Lines/Drains/Airways     Peripheral Intravenous Line                 Peripheral IV - Single Lumen 07/21/19 0251 18 G Left Antecubital less than 1 day         Peripheral IV - Single Lumen 07/21/19 18 G Left Wrist less than 1 day         Peripheral IV - Single Lumen 07/21/19 18 G Right Wrist less than 1 day                Physical Exam   Constitutional: He is oriented to person, place, and time. He appears well-developed and well-nourished.   HENT:   Head: Normocephalic.   Neck: Normal range of motion. Neck supple. Normal carotid pulses, no hepatojugular reflux and no JVD present. Carotid bruit is not present. No thyromegaly present.   Cardiovascular: Normal rate, regular rhythm, S1 normal and S2 normal. PMI is not displaced. Exam reveals no S3, no S4, no distant heart sounds, no friction rub, no midsystolic click and no opening snap.   No murmur heard.  Pulses:       Radial pulses are 2+ on the right side, and 2+ on the left side.   Pulmonary/Chest: Effort normal and breath sounds normal. He has no wheezes. He has no rales.   Abdominal: Soft. Bowel sounds are normal. He exhibits no distension, no abdominal bruit, no ascites and no mass. There is no tenderness.   Musculoskeletal: He exhibits no edema.   R BKA  Left foot amputation   Neurological: He is alert and oriented to person, place, and time.   Skin: Skin is warm.   Psychiatric: He has a normal mood and affect. His behavior is normal.   Nursing note and vitals reviewed.      Significant Labs:   ABG: No results for input(s): PH, PCO2, HCO3, POCSATURATED, BE in the last 48 hours., Blood Culture: No results for input(s): LABBLOO in the last 48 hours., BMP:   Recent Labs   Lab 07/21/19  0251   *   *   K 4.3      CO2 18*   BUN 21   CREATININE 1.6*   CALCIUM 9.3   , CMP   Recent Labs   Lab 07/21/19  0251   NA  133*   K 4.3      CO2 18*   *   BUN 21   CREATININE 1.6*   CALCIUM 9.3   PROT 6.7   ALBUMIN 2.8*   BILITOT 0.9   ALKPHOS 92   AST 25   ALT 12   ANIONGAP 14   ESTGFRAFRICA 51*   EGFRNONAA 45*   , CBC   Recent Labs   Lab 07/21/19 0251   WBC 14.60*   HGB 15.0   HCT 44.8      , INR   Recent Labs   Lab 07/21/19 0251   INR 1.1   , Lipid Panel No results for input(s): CHOL, HDL, LDLCALC, TRIG, CHOLHDL in the last 48 hours. and Troponin   Recent Labs   Lab 07/21/19 0251   TROPONINI 12.445*       Significant Imaging: Echocardiogram:   2D echo with color flow doppler:   Results for orders placed or performed during the hospital encounter of 09/18/18   2D echo with color flow doppler   Result Value Ref Range    QEF 60 55 - 65    Diastolic Dysfunction No     Narrative    Date of Procedure: 09/21/2018        TEST DESCRIPTION   Technical Quality: This is a portable study performed at the patient's bedside. This is a technically challenging study.     Aorta: The aortic root is normal in size, measuring 2.9 cm at sinotubular junction and 3.5 cm at Sinuses of Valsalva. The proximal ascending aorta is normal in size, measuring 2.8 cm across.     Left Atrium: The left atrial volume index is normal, measuring 31.59 cc/m2.     Left Ventricle: The left ventricle is normal in size, with an end-diastolic diameter of 3.7 cm, and an end-systolic diameter of 2.8 cm. Wall thickness is increased, with the septum measuring 1.6 cm and the posterior wall measuring 1.5 cm across. Relative   wall thickness was increased at 0.81, and the LV mass index was increased at 122.4 g/m2 consistent with concentric left ventricular hypertrophy. There are no regional wall motion abnormalities. Left ventricular systolic function appears normal. Visually   estimated ejection fraction is 60-65%. The LV Doppler derived stroke volume equals 79.0 ccs.     Diastolic indices: E wave velocity 0.6 m/s, E/A ratio 0.7,  msec., E/e' ratio(avg)  12. Diastolic function is normal.     Right Atrium: The right atrium is normal in size, measuring 4.1 cm in length and 2.5 cm in width in the apical view.     Right Ventricle: The right ventricle is normal in size. Global right ventricular systolic function appears normal. Tricuspid annular plane systolic excursion (TAPSE) is 1.7 cm.     Aortic Valve:  Aortic valve is normal in structure with normal leaflet mobility. The peak gradient obtained across the aortic valve is 7 mmHg, with a mean gradient of 4 mmHg. Using a left ventricular outflow tract diameter of 2.2 cm, a left ventricular   outflow tract velocity time integral of 20 cm, and a peak instantaneous transvalvular velocity time integral of 25 cm, the calculated aortic valve area is 3.14 cm2(AVAi is 1.47 cm2/m2).     Mitral Valve:  Mitral valve is normal in structure with normal leaflet mobility. The pressure half time is 65 msec. The calculated mitral valve area is 3.38 cm2.     Tricuspid Valve:  Tricuspid valve is normal in structure with normal leaflet mobility.     Pulmonary Valve:  Pulmonary valve is normal in structure with normal leaflet mobility.     IVC: IVC is normal in size and collapses > 50% with a sniff, suggesting normal right atrial pressure of 3 mmHg.     Atrial Septum: The atrial septum is intact.     Intracavitary: There is no evidence of pericardial effusion, intracavity mass, thrombi, or vegetation.         CONCLUSIONS     1 - Concentric hypertrophy.     2 - No wall motion abnormalities.     3 - Normal left ventricular systolic function (EF 60-65%).     4 - Normal left ventricular diastolic function.     5 - Normal right ventricular systolic function .             This document has been electronically    SIGNED BY: Silviano Hilton MD On: 09/21/2018 15:31     Assessment and Plan:     ACUTE STEMI, SUSPECT OCCLUDED WRAP AROUND LAD IN LIGHT OF ECG ABNORMALITIES.  LHC + POSSIBLE PCI.  TO CATH LAB NOW.  PT INFORMED OF INDICATIONS FOR LHC/PCI, ALL  RISKS/BENEFITS EXPLAINED.  PT AGREEABLE TO PROCEEDING.  INCREASED RISK FOR CATH COMPLICATIONS EXPLAINED DUE TO H/O CRI, EXTENSIVE VASCULAR DISEASE.  FURTHER RECS TO FOLLOW.    Active Diagnoses:    Diagnosis Date Noted POA    PRINCIPAL PROBLEM:  Acute ST elevation myocardial infarction (STEMI) of anterolateral wall [I21.09] 07/21/2019 Yes    Chest pain [R07.9] 07/21/2019 Yes    Acute ST elevation myocardial infarction (STEMI) of inferior wall [I21.19] 07/21/2019 Yes    CAD in native artery [I25.10] 07/21/2019 Yes    Abnormal ECG [R94.31] 07/21/2019 Yes    CRI (chronic renal insufficiency) [N18.9] 07/21/2019 Yes    PAD (peripheral artery disease) [I73.9] 07/21/2019 Yes      Problems Resolved During this Admission:       VTE Risk Mitigation (From admission, onward)    None          Andrew Valdez MD  Cardiology   [facility] -

## 2019-07-21 NOTE — ASSESSMENT & PLAN NOTE
Beta-blocker, statin, Plavix, aspirin, blood pressure control  Cardiology managing  Further evaluation/diagnostics/interventions/consults pending course

## 2019-07-21 NOTE — PLAN OF CARE
Problem: Adult Inpatient Plan of Care  Goal: Plan of Care Review  Outcome: Ongoing (interventions implemented as appropriate)  R groin sheath dc'd. Pt tolerated. Gauze and tegaderm dressing in place. Bedrest up at 1600. Site WNL. No hematoma noted. VSS. Repositioned self in bed. L heel elevated on pillow. Call light in reach. Will continue to monitor.

## 2019-07-21 NOTE — ASSESSMENT & PLAN NOTE
Status post catheterization.      medical management for apical LAD occlusion (small tortuous vessel and very distal occlusion  ASA/Plavix for one year

## 2019-07-21 NOTE — SUBJECTIVE & OBJECTIVE
Past Medical History:   Diagnosis Date    DINORAH (acute kidney injury) 2016    Arthritis     CAD in native artery 2019    CHF (congestive heart failure)     Chronic obstructive pulmonary disease 2016    Coronary artery disease involving native coronary artery of native heart without angina pectoris 2016    CRI (chronic renal insufficiency) 2019     donor kidney transplant for DM 16     Induction with Thymo x3 and IV solumedrol to total 875mg  Kidney Biopsy  2016: 16 glomeruli, ACR type 1 AVR type 2, significant microcirculatory changes, c4d negative, No DSA, 5 to10% fibrosis. Treated with thymo x8 2016- no rejection      Diastolic heart failure     Encounter for blood transfusion     ESRD on RRT since 10/2013 10/29/2013    Biopsy proven diabetic nephropathy and lymphoplasmacytic interstitial infiltrate not c/w with AIN (ddx sjogrens or assoc with tamm-horsefall protein extravasation)     GERD (gastroesophageal reflux disease)     History of hepatitis C, s/p successful Rx w/ SVR12 - 2017    Completed 12 weeks harvoni w/ SVR    Hyperlipidemia     Hypertension     PAD (peripheral artery disease) 2019    PIC line (peripherally inserted central catheter) flush     Prophylactic immunotherapy     Proteinuria     PVD (peripheral vascular disease) 2017    RLE BKA CT 16 Extensive atherosclerotic disease of the aorta and mesenteric arteries.     Renal hypertension     Type 2 diabetes mellitus with diabetic neuropathy, with long-term current use of insulin 2016    Vitamin B12 deficiency        Past Surgical History:   Procedure Laterality Date    AMPUTATION, FOOT, TRANSMETATARSAL Left 2018    [facility] OR    AMPUTATION, FOOT, TRANSMETATARSAL Left 10/31/2018    Performed by [facility] at [facility] OR    AMPUTATION, FOOT, TRANSMETATARSAL Left 2018    Performed by Karla WOODY  LASHONDA Wheeler at [facility] OR    av bovine graft      Left UE    AV FISTULA PLACEMENT      left UE    BIOPSY Tranplanted Kidney N/A 8/15/2016    Performed by [facility]anna MD at [facility] OR 2ND FLR    BIOPSY, LIVER, TRANSJUGULAR APPROACH N/A 4/24/2014    Performed by [facility] Diagnostic Provider at [facility] CATH LAB    CARDIAC CATHETERIZATION  02/2015    CLOSURE, WOUND Left 11/5/2018    Performed by Karla Wheeler DPM at [facility] OR    CLOSURE, WOUND Left 9/24/2018    Performed by Karla Wheeler DPM at [facility] OR    DEBRIDEMENT, METATARSAL BONE, 2 OR MORE BONES Left 11/5/2018    Performed by Karla Wheeler DPM at [facility] OR    INSERTION, CATHETER, VASCULAR N/A 10/31/2013    Performed by Ralph Mckeon MD at [facility] CATH LAB    IRRIGATION AND DEBRIDEMENT Left 7/9/2018    Performed by Katerina Magaña DPM at [facility] OR    IRRIGATION AND DEBRIDEMENT, LOWER EXTREMITY Left 10/31/2018    Performed by Karla Wheeler DPM at [facility] OR    KIDNEY TRANSPLANT  05/21/2016    LEFT LEG ANGIOGRAM N/A 6/14/2018    Performed by Donal Mcdonald MD at [facility] CATH LAB    LEG AMPUTATION THROUGH KNEE  2011    right LE, started as nail puncture leading to diabetic ulcer    LENGTHENING, TENDON, ACHILLES Left 9/24/2018    Performed by Karla Wheeler DPM at [facility] OR    TRANSPLANT-KIDNEY Right 5/21/2016    Performed by Ronny Bergeron MD at [facility] OR 2ND FLR       Review of patient's allergies indicates:  No Known Allergies    Family History     Problem Relation (Age of Onset)    Cancer Father    Diabetes Father    Heart failure Father, Mother    Stroke Father        Tobacco Use    Smoking status: Never Smoker    Smokeless tobacco: Never Used   Substance and Sexual Activity    Alcohol use: Yes     Alcohol/week: 3.6 oz     Types: 6 Cans of beer per week     Comment: seldom    Drug use: No    Sexual activity: Never         Review of Systems   Musculoskeletal:        Stump pain   Skin: Positive for wound (left temporal area).   All other  systems reviewed and are negative.    Objective:     Vital Signs (Most Recent):  Temp: 98.1 °F (36.7 °C) (07/21/19 0715)  Pulse: 94 (07/21/19 0800)  Resp: 17 (07/21/19 0800)  BP: 93/77 (07/21/19 0800)  SpO2: 99 % (07/21/19 0800) Vital Signs (24h Range):  Temp:  [97.5 °F (36.4 °C)-98.1 °F (36.7 °C)] 98.1 °F (36.7 °C)  Pulse:  [] 94  Resp:  [14-27] 17  SpO2:  [97 %-99 %] 99 %  BP: ()/() 93/77     Weight: 97.2 kg (214 lb 4.6 oz)  Body mass index is 29.89 kg/m².    No intake or output data in the 24 hours ending 07/21/19 0916    Physical Exam   Constitutional: He is oriented to person, place, and time. Vital signs are normal. He has a sickly appearance. Nasal cannula in place.       HENT:   Head: Normocephalic and atraumatic.   Nose: Nose normal.   Mouth/Throat: Oropharynx is clear and moist.   Eyes: Pupils are equal, round, and reactive to light. EOM are normal.   Neck: Normal range of motion. Neck supple.   Cardiovascular: Normal rate, regular rhythm and normal heart sounds.   Pulmonary/Chest: Effort normal and breath sounds normal. No stridor. No respiratory distress. He has no wheezes.   Abdominal: Soft. Bowel sounds are normal.   Musculoskeletal: Normal range of motion.   Neurological: He is alert and oriented to person, place, and time.   Skin: Skin is warm and dry.   Nursing note and vitals reviewed.      Vents:       Lines/Drains/Airways     Peripheral Intravenous Line                 Peripheral IV - Single Lumen 07/21/19 0251 18 G Left Antecubital less than 1 day         Peripheral IV - Single Lumen 07/21/19 18 G Left Wrist less than 1 day         Peripheral IV - Single Lumen 07/21/19 18 G Right Wrist less than 1 day                Significant Labs:    CBC/Anemia Profile:  Recent Labs   Lab 07/21/19  0251 07/21/19  0515   WBC 14.60* 12.35   HGB 15.0 15.3   HCT 44.8 45.4    253   MCV 90 89   RDW 13.8 13.7        Chemistries:  Recent Labs   Lab 07/21/19  0251 07/21/19  0515   * 130*   130*   K 4.3 4.8  4.8    104  104   CO2 18* 14*  14*   BUN 21 22  22   CREATININE 1.6* 1.4  1.4   CALCIUM 9.3 9.0  9.0   ALBUMIN 2.8* 2.6*   PROT 6.7 6.3   BILITOT 0.9 0.8   ALKPHOS 92 84   ALT 12 11   AST 25 22   MG  --  1.7   PHOS  --  2.8       Cardiac Markers: No results for input(s): CKMB, TROPONINT, MYOGLOBIN in the last 48 hours.  All pertinent labs within the past 24 hours have been reviewed.    Significant Imaging:   I have reviewed and interpreted all pertinent imaging results/findings within the past 24 hours.     EKG  Undetermined rhythm  Inferior infarct , new  Anterolateral injury pattern  ** ** ACUTE MI / STEMI ** **  Abnormal ECG  When compared with ECG of 12-APR-2019 11:55,  Current undetermined rhythm precludes rhythm comparison, needs review  Acute Inferior infarct is now Present    [facility]  B. Summary/Post-Operative Diagnosis      1.   Apical LAD occlusion.   2.   Normal LVEF.   3.  Severe R LE PAD.      CXR  FINDINGS:  Support devices: None    The lungs are clear, with normal appearance of pulmonary vasculature and no pleural effusion or pneumothorax.    The cardiac silhouette is normal in size. The hilar and mediastinal contours are unremarkable.    Bones are intact.      Impression       No acute abnormality.

## 2019-07-21 NOTE — PROGRESS NOTES
Now consult was ordered to consult EICU to assist with critical care patient when HM was consulted.  Called and Spoke with Primary nurse to ensure he was made aware. Primary nurse notified to consult EICU stat for assistance and any additional recommendations in the management of this critical care patient.

## 2019-07-21 NOTE — ED NOTES
Report given to Krishan in cath lab. Pt brought to cath lab with Zandra RN, Benito RN, and Charbel RN on tele monitor with defibrillator.

## 2019-07-21 NOTE — CONSULTS
[facility] -   Critical Care Medicine  Consult Note    Patient Name: Lavelle Ladd  MRN: [id number]  Admission Date: 2019  Hospital Length of Stay: 0 days  Code Status: Prior  Attending Physician: Andrew Valdez MD   Primary Care Provider: Rishabh Esteban MD   Principal Problem: Acute ST elevation myocardial infarction (STEMI) of anterolateral wall      Subjective:     HPI:  Lavelle Ladd is 63 years old  Admitted to MICU : post [facility], indication EKG changes and Chest pain.  Presented with chest pain for several hours  Anterior lateral and inferior ST elevation.Apical LAD occluded.  Medical management advised  S/P [facility]  w Dr. Hannah, 50% prox-mid LAD stenosis with med management advised.  A PMHx of CHF, Diastolic heart failure, HLD, and HTN  , Kidney transplant right side, Right BKA, Left Mid tarsal amputation, Reactive airway disease            Hospital/ICU Course:  : examined pain free, still has sheath: Woody placed: 500mls    Past Medical History:   Diagnosis Date    DINORAH (acute kidney injury) 2016    Arthritis     CAD in native artery 2019    CHF (congestive heart failure)     Chronic obstructive pulmonary disease 2016    Coronary artery disease involving native coronary artery of native heart without angina pectoris 2016    CRI (chronic renal insufficiency) 2019     donor kidney transplant for DM 16     Induction with Thymo x3 and IV solumedrol to total 875mg  Kidney Biopsy  2016: 16 glomeruli, ACR type 1 AVR type 2, significant microcirculatory changes, c4d negative, No DSA, 5 to10% fibrosis. Treated with thymo x8 2016- no rejection      Diastolic heart failure     Encounter for blood transfusion     ESRD on RRT since 10/2013 10/29/2013    Biopsy proven diabetic nephropathy and lymphoplasmacytic interstitial infiltrate not c/w with AIN (ddx sjogrens or assoc with tamm-horsefall protein extravasation)     GERD  (gastroesophageal reflux disease)     History of hepatitis C, s/p successful Rx w/ SVR12 - 4/2017 4/5/2017    Completed 12 weeks harvoni w/ SVR    Hyperlipidemia     Hypertension     PAD (peripheral artery disease) 7/21/2019    PIC line (peripherally inserted central catheter) flush     Prophylactic immunotherapy     Proteinuria     PVD (peripheral vascular disease) 6/26/2017    RLE BKA CT 12/11/16 Extensive atherosclerotic disease of the aorta and mesenteric arteries.     Renal hypertension     Type 2 diabetes mellitus with diabetic neuropathy, with long-term current use of insulin 12/1/2016    Vitamin B12 deficiency        Past Surgical History:   Procedure Laterality Date    AMPUTATION, FOOT, TRANSMETATARSAL Left 11/5/2018    Performed by [facility]la Wheeler D[facility] at [facility] OR    AMPUTATION, FOOT, TRANSMETATARSAL Left 10/31/2018    Performed by [facility] Wheeler [facility] at [facility] OR    AMPUTATION, FOOT, TRANSMETATARSAL Left 9/21/2018    Performed by [facility] Wheeler [facility] at [facility] OR    av bovine graft      Left UE    AV FISTULA PLACEMENT      left UE    BIOPSY Tranplanted Kidney N/A 8/15/2016    Performed by [facility] at [facility] 2ND FLR    BIOPSY, LIVER, TRANSJUGULAR APPROACH N/A 4/24/2014    Performed by [facility] Diagnostic Provider at [facility] CATH LAB    CARDIAC CATHETERIZATION  02/2015    CLOSURE, WOUND Left 11/5/2018    Performed by [facility] Wheeler D[facility] at [facility] OR    CLOSURE, WOUND Left 9/24/2018    Performed by [facility] Wheeler D[facility] at [facility] OR    DEBRIDEMENT, METATARSAL BONE, 2 OR MORE BONES Left 11/5/2018    Performed by [facility] Wheeler D[facility] at [facility] OR    INSERTION, CATHETER, VASCULAR N/A 10/31/2013    Performed by Ralph Mckeon MD at [facility] CATH LAB    IRRIGATION AND DEBRIDEMENT Left 7/9/2018    Performed by Katerina Magaña DPM at [facility] OR    IRRIGATION AND DEBRIDEMENT, LOWER EXTREMITY Left 10/31/2018    Performed by [facility] Wheeler [facility] at [facility] OR    KIDNEY TRANSPLANT   05/21/2016    LEFT LEG ANGIOGRAM N/A 6/14/2018    Performed by Donal Mcdonald MD at [facility] CATH LAB    LEG AMPUTATION THROUGH KNEE  2011    right LE, started as nail puncture leading to diabetic ulcer    LENGTHENING, TENDON, ACHILLES Left 9/24/2018    Performed by [facility] Wheeler D[facility] at [facility] OR    TRANSPLANT-KIDNEY Right 5/21/2016    Performed by Ronny Bergeron MD at [facility] OR [address] [place] [place] [address]       Review of patient's allergies indicates:  No Known Allergies    Family History     Problem Relation (Age of Onset)    Cancer Father    Diabetes Father    Heart failure Father, Mother    Stroke Father        Tobacco Use    Smoking status: Never Smoker    Smokeless tobacco: Never Used   Substance and Sexual Activity    Alcohol use: Yes     Alcohol/week: 3.6 oz     Types: 6 Cans of beer per week     Comment: seldom    Drug use: No    Sexual activity: Never         Review of Systems   Musculoskeletal:        Stump pain   Skin: Positive for wound (left temporal area).   All other systems reviewed and are negative.    Objective:     Vital Signs (Most Recent):  Temp: 98.1 °F (36.7 °C) (07/21/19 0715)  Pulse: 94 (07/21/19 0800)  Resp: 17 (07/21/19 0800)  BP: 93/77 (07/21/19 0800)  SpO2: 99 % (07/21/19 0800) Vital Signs (24h Range):  Temp:  [97.5 °F (36.4 °C)-98.1 °F (36.7 °C)] 98.1 °F (36.7 °C)  Pulse:  [] 94  Resp:  [14-27] 17  SpO2:  [97 %-99 %] 99 %  BP: ()/() 93/77     Weight: 97.2 kg (214 lb 4.6 oz)  Body mass index is 29.89 kg/m².    No intake or output data in the 24 hours ending 07/21/19 0916    Physical Exam   Constitutional: He is oriented to person, place, and time. Vital signs are normal. He has a sickly appearance. Nasal cannula in place.       HENT:   Head: Normocephalic and atraumatic.   Nose: Nose normal.   Mouth/Throat: Oropharynx is clear and moist.   Eyes: Pupils are equal, round, and reactive to light. EOM are normal.   Neck: Normal range of motion. Neck supple.   Cardiovascular: Normal rate,  regular rhythm and normal heart sounds.   Pulmonary/Chest: Effort normal and breath sounds normal. No stridor. No respiratory distress. He has no wheezes.   Abdominal: Soft. Bowel sounds are normal.   Musculoskeletal: Normal range of motion.   Neurological: He is alert and oriented to person, place, and time.   Skin: Skin is warm and dry.   Nursing note and vitals reviewed.      Vents:       Lines/Drains/Airways     Peripheral Intravenous Line                 Peripheral IV - Single Lumen 07/21/19 0251 18 G Left Antecubital less than 1 day         Peripheral IV - Single Lumen 07/21/19 18 G Left Wrist less than 1 day         Peripheral IV - Single Lumen 07/21/19 18 G Right Wrist less than 1 day                Significant Labs:    CBC/Anemia Profile:  Recent Labs   Lab 07/21/19 0251 07/21/19  0515   WBC 14.60* 12.35   HGB 15.0 15.3   HCT 44.8 45.4    253   MCV 90 89   RDW 13.8 13.7        Chemistries:  Recent Labs   Lab 07/21/19 0251 07/21/19  0515   * 130*  130*   K 4.3 4.8  4.8    104  104   CO2 18* 14*  14*   BUN 21 22  22   CREATININE 1.6* 1.4  1.4   CALCIUM 9.3 9.0  9.0   ALBUMIN 2.8* 2.6*   PROT 6.7 6.3   BILITOT 0.9 0.8   ALKPHOS 92 84   ALT 12 11   AST 25 22   MG  --  1.7   PHOS  --  2.8       Cardiac Markers: No results for input(s): CKMB, TROPONINT, MYOGLOBIN in the last 48 hours.  All pertinent labs within the past 24 hours have been reviewed.    Significant Imaging:   I have reviewed and interpreted all pertinent imaging results/findings within the past 24 hours.     EKG  Undetermined rhythm  Inferior infarct , new  Anterolateral injury pattern  ** ** ACUTE MI / STEMI ** **  Abnormal ECG  When compared with ECG of 12-APR-2019 11:55,  Current undetermined rhythm precludes rhythm comparison, needs review  Acute Inferior infarct is now Present    [facility]  B. Summary/Post-Operative Diagnosis      1.   Apical LAD occlusion.   2.   Normal LVEF.   3.  Severe R LE  PAD.      CXR  FINDINGS:  Support devices: None    The lungs are clear, with normal appearance of pulmonary vasculature and no pleural effusion or pneumothorax.    The cardiac silhouette is normal in size. The hilar and mediastinal contours are unremarkable.    Bones are intact.      Impression       No acute abnormality.           ABG  No results for input(s): PH, PO2, PCO2, HCO3, BE in the last 168 hours.  Assessment/Plan:     Ophtho  Type 2 diabetes mellitus with retinopathy, with long-term current use of insulin  Goals 100-180  Sliding scale Moderate  Diabetic diet    Pulmonary  Reactive airway disease  Keep Spo2 > 92%  Incentive spirometry and deep breathing  PRN EDWINA      Cardiac/Vascular  * Acute ST elevation myocardial infarction (STEMI) of anterolateral wall  Post [facility] care  Bedrest  Pull Sheath when ACT criteria met  Daily EKG  Repeat CK and Trop in AM  Antiplatelet: [x] Aspirin, [x] Clopidogrel   Antianginal: [x] isosorbide mononitrate  STATIN: [x] Atorvastatin 40 mg  Beta Blocker; [x]  Metoprolol 25 mg  IVF Crystaloid 100mls/hr  ECHO this AM        PVD (peripheral vascular disease)  Optimise : BP, Neuro vascular vital signs, Keep euvolemic: IVF    Essential hypertension  PO NORVASC  PRN Hydralazine    Renal/  Stage 3 chronic kidney disease  Keep Euvolemic IVF  PO Na bicarb       donor kidney transplant for DM 16  Continue Prednisone 5 mg and Mycophenolate BID    GI  GERD (gastroesophageal reflux disease)  Pantoprazole EC    Orthopedic  Hx of right BKA  Gabapentin for neropathic pain        Critical Care Daily Checklist:    A: Awake: RASS Goal/Actual Goal:    Actual: Villavicencio Agitation Sedation Scale (RASS): Restless   B: Spontaneous Breathing Trial Performed?     C: SAT & SBT Coordinated?  n/a                      D: Delirium: CAM-ICU     E: Early Mobility Performed? Yes   F: Feeding Goal:    Status:     Current Diet Order   Procedures    Diet diabetic [facility]; 2800 Calorie      Order Specific Question:   Indicate patient location for additional diet options:     Answer:   [facility]     Order Specific Question:   Total calories:     Answer:   2800 Calorie      AS: Analgesia/Sedation Norco, morphine   T: Thromboembolic Prophylaxis Heparin sc   H: HOB > 300 Yes   U: Stress Ulcer Prophylaxis (if needed) Pantoprazole EC   G: Glucose Control Moderate Sliding scale   B: Bowel Function     I: Indwelling Catheter (Lines & Woody) Necessity Woody   D: De-escalation of Antimicrobials/Pharmacotherapies NONE: Hx of VRE    Plan for the day/ETD DC Sheath when criteria met, Current stable cardiovascular signs, transition to TELEMETRY if okay per cardiology next 24 hrs    Code Status:  Family/Goals of Care: Prior  Home     Critical Care Time: 35 minutes  Critical secondary to Patient has a condition that poses threat to life and bodily function: Acute Myocardial Infarction     Critical care was time spent personally by me on the following activities: development of treatment plan with patient or surrogate and bedside caregivers, discussions with consultants, evaluation of patient's response to treatment, examination of patient, ordering and performing treatments and interventions, ordering and review of laboratory studies, ordering and review of radiographic studies, pulse oximetry, re-evaluation of patient's condition. This critical care time did not overlap with that of any other provider or involve time for any procedures.    Thank you for your consult. I will follow-up with patient. Please contact us if you have any additional questions.     Kaiser Wright MD  Critical Care Medicine  [facility]        Scalp ulcer suspicious for skin cancer: needs to address with derm    Sign off on transfer

## 2019-07-21 NOTE — ASSESSMENT & PLAN NOTE
Plavix, statin, aspirin  Cardiology managing  Further evaluation/diagnostics/interventions/consults pending course

## 2019-07-21 NOTE — SUBJECTIVE & OBJECTIVE
Interval History:     Review of Systems   Constitutional: Positive for activity change.   HENT: Negative.    Eyes: Negative.    Respiratory: Negative.    Cardiovascular: Negative.    Gastrointestinal: Positive for nausea.   Genitourinary: Negative.    Musculoskeletal: Negative.    Skin: Negative.    Allergic/Immunologic: Negative.    Neurological: Negative.    Hematological: Negative.    Psychiatric/Behavioral: Negative.      Objective:     Vital Signs (Most Recent):  Temp: 97.7 °F (36.5 °C) (07/21/19 1505)  Pulse: 94 (07/21/19 1515)  Resp: 14 (07/21/19 1515)  BP: 112/82 (07/21/19 1515)  SpO2: 96 % (07/21/19 1515) Vital Signs (24h Range):  Temp:  [97.5 °F (36.4 °C)-98.1 °F (36.7 °C)] 97.7 °F (36.5 °C)  Pulse:  [] 94  Resp:  [14-27] 14  SpO2:  [96 %-99 %] 96 %  BP: ()/() 112/82     Weight: 97.2 kg (214 lb 4.6 oz)  Body mass index is 29.89 kg/m².    Intake/Output Summary (Last 24 hours) at 7/21/2019 1534  Last data filed at 7/21/2019 1500  Gross per 24 hour   Intake 1067 ml   Output 920 ml   Net 147 ml      Physical Exam   Constitutional: He is oriented to person, place, and time. He appears well-developed and well-nourished. He appears distressed.   HENT:   Head: Normocephalic and atraumatic.   Eyes: Pupils are equal, round, and reactive to light. EOM are normal.   Neck: Normal range of motion. Neck supple.   Cardiovascular: Normal rate and regular rhythm.   Pulmonary/Chest: Effort normal and breath sounds normal.   Abdominal: Soft. Bowel sounds are normal.   Neurological: He is alert and oriented to person, place, and time.   Skin: Skin is warm and dry.   Psychiatric: He has a normal mood and affect. His behavior is normal. Judgment and thought content normal.       Significant Labs:   BMP:   Recent Labs   Lab 07/21/19  0515   *  170*   *  130*   K 4.8  4.8     104   CO2 14*  14*   BUN 22  22   CREATININE 1.4  1.4   CALCIUM 9.0  9.0   MG 1.7     CBC:   Recent Labs   Lab  07/21/19  0251 07/21/19  0515   WBC 14.60* 12.35   HGB 15.0 15.3   HCT 44.8 45.4    253       Significant Imaging: I have reviewed all pertinent imaging results/findings within the past 24 hours.

## 2019-07-21 NOTE — PROGRESS NOTES
R femoral sheath removed. Pressure held for 20 min by this RN, no signs of swelling, bleeding or bruising noted. Pressure drsg applied to site d/t pt very restless in bed requiring frequent reminders to keep still

## 2019-07-21 NOTE — TREATMENT PLAN
CARDIOLOGY NOTE:    Pt seen in ICU s/p cath.  No new c/o noted.  Stable.  CP improved.  Optimize med tx for CAD.  Add Imdur and Plavix.  tx to telemetry later today if pt doing well.      Dr Valdez

## 2019-07-21 NOTE — ASSESSMENT & PLAN NOTE
Post [facility] care  Bedrest  Pull Sheath when ACT criteria met  Daily EKG  Repeat CK and Trop in AM  Antiplatelet: [x] Aspirin, [x] Clopidogrel   Antianginal: [x] isosorbide mononitrate  STATIN: [x] Atorvastatin 40 mg  Beta Blocker; [x]  Metoprolol 25 mg  IVF Crystaloid 100mls/hr  ECHO this AM

## 2019-07-21 NOTE — ASSESSMENT & PLAN NOTE
Status post catheterization.  Admitted to ICU  Post catheterization care, Cardiology managing  Consult EICU for assistance in management  Further evaluation/diagnostics/interventions/consults pending course

## 2019-07-21 NOTE — PROGRESS NOTES
Patient transferred from ICU. Patient awake, alert and oriented x4. Patient's vitals are WNL. NAD noted. Patient orders transferred and continued. Iv fluids stopped and iv access saline locked. MDs contacted in regards to pain medication being continued.

## 2019-07-21 NOTE — HOSPITAL COURSE
07/21 -STEMI S/P. Recommend maximal medical management for apical LAD occlusion (small tortuous vessel and very distal occlusion. EF 60%   Medical management -ASA/Statin /Plavix for 1 yr    07/22- he denies chest pain.  Lab data showed serum creatinine -17. He was seen by Cardiology and  Medical management is planned.

## 2019-07-21 NOTE — HPI
Lavelle Ladd is 63 years old  Admitted to MICU : post [facility], indication EKG changes and Chest pain.  Presented with chest pain for several hours  Anterior lateral and inferior ST elevation.Apical LAD occluded.  Medical management advised  S/P [facility] 2015 w Dr. Hannah, 50% prox-mid LAD stenosis with med management advised.  A PMHx of CHF, Diastolic heart failure, HLD, and HTN  , Kidney transplant right side, Right BKA, Left Mid tarsal amputation, Reactive airway disease

## 2019-07-22 LAB
ALBUMIN SERPL BCP-MCNC: 2.6 G/DL (ref 3.5–5.2)
ALP SERPL-CCNC: 78 U/L (ref 55–135)
ALT SERPL W/O P-5'-P-CCNC: 10 U/L (ref 10–44)
ANION GAP SERPL CALC-SCNC: 11 MMOL/L (ref 8–16)
ANION GAP SERPL CALC-SCNC: 11 MMOL/L (ref 8–16)
AST SERPL-CCNC: 16 U/L (ref 10–40)
BASOPHILS # BLD AUTO: 0.02 K/UL (ref 0–0.2)
BASOPHILS NFR BLD: 0.2 % (ref 0–1.9)
BILIRUB SERPL-MCNC: 0.5 MG/DL (ref 0.1–1)
BUN SERPL-MCNC: 30 MG/DL (ref 8–23)
BUN SERPL-MCNC: 30 MG/DL (ref 8–23)
CALCIUM SERPL-MCNC: 9.1 MG/DL (ref 8.7–10.5)
CALCIUM SERPL-MCNC: 9.3 MG/DL (ref 8.7–10.5)
CHLORIDE SERPL-SCNC: 106 MMOL/L (ref 95–110)
CHLORIDE SERPL-SCNC: 107 MMOL/L (ref 95–110)
CO2 SERPL-SCNC: 18 MMOL/L (ref 23–29)
CO2 SERPL-SCNC: 19 MMOL/L (ref 23–29)
CREAT SERPL-MCNC: 1.7 MG/DL (ref 0.5–1.4)
CREAT SERPL-MCNC: 1.7 MG/DL (ref 0.5–1.4)
DIFFERENTIAL METHOD: ABNORMAL
EOSINOPHIL # BLD AUTO: 0 K/UL (ref 0–0.5)
EOSINOPHIL NFR BLD: 0.3 % (ref 0–8)
ERYTHROCYTE [DISTWIDTH] IN BLOOD BY AUTOMATED COUNT: 14 % (ref 11.5–14.5)
EST. GFR  (AFRICAN AMERICAN): 48 ML/MIN/1.73 M^2
EST. GFR  (AFRICAN AMERICAN): 48 ML/MIN/1.73 M^2
EST. GFR  (NON AFRICAN AMERICAN): 41 ML/MIN/1.73 M^2
EST. GFR  (NON AFRICAN AMERICAN): 41 ML/MIN/1.73 M^2
GLUCOSE SERPL-MCNC: 108 MG/DL (ref 70–110)
GLUCOSE SERPL-MCNC: 118 MG/DL (ref 70–110)
HCT VFR BLD AUTO: 43.6 % (ref 40–54)
HGB BLD-MCNC: 14.4 G/DL (ref 14–18)
LYMPHOCYTES # BLD AUTO: 1.9 K/UL (ref 1–4.8)
LYMPHOCYTES NFR BLD: 20.4 % (ref 18–48)
MCH RBC QN AUTO: 29.7 PG (ref 27–31)
MCHC RBC AUTO-ENTMCNC: 33 G/DL (ref 32–36)
MCV RBC AUTO: 90 FL (ref 82–98)
MONOCYTES # BLD AUTO: 1.2 K/UL (ref 0.3–1)
MONOCYTES NFR BLD: 12.3 % (ref 4–15)
NEUTROPHILS # BLD AUTO: 6.4 K/UL (ref 1.8–7.7)
NEUTROPHILS NFR BLD: 67.1 % (ref 38–73)
PLATELET # BLD AUTO: 272 K/UL (ref 150–350)
PMV BLD AUTO: 9.7 FL (ref 9.2–12.9)
POCT GLUCOSE: 114 MG/DL (ref 70–110)
POCT GLUCOSE: 202 MG/DL (ref 70–110)
POCT GLUCOSE: 235 MG/DL (ref 70–110)
POTASSIUM SERPL-SCNC: 4.8 MMOL/L (ref 3.5–5.1)
POTASSIUM SERPL-SCNC: 4.9 MMOL/L (ref 3.5–5.1)
PROT SERPL-MCNC: 6.3 G/DL (ref 6–8.4)
RBC # BLD AUTO: 4.85 M/UL (ref 4.6–6.2)
SODIUM SERPL-SCNC: 136 MMOL/L (ref 136–145)
SODIUM SERPL-SCNC: 136 MMOL/L (ref 136–145)
WBC # BLD AUTO: 9.52 K/UL (ref 3.9–12.7)

## 2019-07-22 PROCEDURE — 94799 UNLISTED PULMONARY SVC/PX: CPT | Mod: HCNC

## 2019-07-22 PROCEDURE — 93010 ELECTROCARDIOGRAM REPORT: CPT | Mod: HCNC,,, | Performed by: INTERNAL MEDICINE

## 2019-07-22 PROCEDURE — 25000003 PHARM REV CODE 250: Mod: HCNC | Performed by: INTERNAL MEDICINE

## 2019-07-22 PROCEDURE — 94761 N-INVAS EAR/PLS OXIMETRY MLT: CPT | Mod: HCNC

## 2019-07-22 PROCEDURE — 21400001 HC TELEMETRY ROOM: Mod: HCNC

## 2019-07-22 PROCEDURE — 51798 US URINE CAPACITY MEASURE: CPT | Mod: HCNC

## 2019-07-22 PROCEDURE — 80048 BASIC METABOLIC PNL TOTAL CA: CPT | Mod: HCNC

## 2019-07-22 PROCEDURE — 99900035 HC TECH TIME PER 15 MIN (STAT): Mod: HCNC

## 2019-07-22 PROCEDURE — 93005 ELECTROCARDIOGRAM TRACING: CPT | Mod: HCNC

## 2019-07-22 PROCEDURE — 25000003 PHARM REV CODE 250: Mod: HCNC | Performed by: NURSE PRACTITIONER

## 2019-07-22 PROCEDURE — 80053 COMPREHEN METABOLIC PANEL: CPT | Mod: HCNC

## 2019-07-22 PROCEDURE — 63600175 PHARM REV CODE 636 W HCPCS: Mod: HCNC | Performed by: INTERNAL MEDICINE

## 2019-07-22 PROCEDURE — 99232 PR SUBSEQUENT HOSPITAL CARE,LEVL II: ICD-10-PCS | Mod: HCNC,,, | Performed by: INTERNAL MEDICINE

## 2019-07-22 PROCEDURE — 93010 EKG 12-LEAD: ICD-10-PCS | Mod: HCNC,,, | Performed by: INTERNAL MEDICINE

## 2019-07-22 PROCEDURE — 99232 SBSQ HOSP IP/OBS MODERATE 35: CPT | Mod: HCNC,,, | Performed by: INTERNAL MEDICINE

## 2019-07-22 PROCEDURE — 85025 COMPLETE CBC W/AUTO DIFF WBC: CPT | Mod: HCNC

## 2019-07-22 PROCEDURE — 36415 COLL VENOUS BLD VENIPUNCTURE: CPT | Mod: HCNC

## 2019-07-22 RX ORDER — CLOPIDOGREL BISULFATE 75 MG/1
75 TABLET ORAL DAILY
Status: DISCONTINUED | OUTPATIENT
Start: 2019-07-22 | End: 2019-07-23 | Stop reason: HOSPADM

## 2019-07-22 RX ORDER — ATORVASTATIN CALCIUM 40 MG/1
80 TABLET, FILM COATED ORAL DAILY
Status: DISCONTINUED | OUTPATIENT
Start: 2019-07-22 | End: 2019-07-23 | Stop reason: HOSPADM

## 2019-07-22 RX ORDER — HYDROCODONE BITARTRATE AND ACETAMINOPHEN 10; 325 MG/1; MG/1
1 TABLET ORAL EVERY 6 HOURS PRN
Status: DISCONTINUED | OUTPATIENT
Start: 2019-07-22 | End: 2019-07-23 | Stop reason: HOSPADM

## 2019-07-22 RX ORDER — SODIUM CHLORIDE 9 MG/ML
INJECTION, SOLUTION INTRAVENOUS CONTINUOUS
Status: DISCONTINUED | OUTPATIENT
Start: 2019-07-22 | End: 2019-07-23 | Stop reason: HOSPADM

## 2019-07-22 RX ADMIN — HEPARIN SODIUM 5000 UNITS: 5000 INJECTION, SOLUTION INTRAVENOUS; SUBCUTANEOUS at 03:07

## 2019-07-22 RX ADMIN — PANTOPRAZOLE SODIUM 40 MG: 40 TABLET, DELAYED RELEASE ORAL at 09:07

## 2019-07-22 RX ADMIN — CLOPIDOGREL BISULFATE 75 MG: 75 TABLET ORAL at 09:07

## 2019-07-22 RX ADMIN — MYCOPHENOLATE MOFETIL 500 MG: 500 TABLET, FILM COATED ORAL at 08:07

## 2019-07-22 RX ADMIN — HYDROCODONE BITARTRATE AND ACETAMINOPHEN 2 TABLET: 10; 325 TABLET ORAL at 04:07

## 2019-07-22 RX ADMIN — METOPROLOL TARTRATE 25 MG: 25 TABLET ORAL at 08:07

## 2019-07-22 RX ADMIN — METOPROLOL TARTRATE 25 MG: 25 TABLET ORAL at 09:07

## 2019-07-22 RX ADMIN — MYCOPHENOLATE MOFETIL 500 MG: 500 TABLET, FILM COATED ORAL at 09:07

## 2019-07-22 RX ADMIN — ASPIRIN 81 MG: 81 TABLET, COATED ORAL at 09:07

## 2019-07-22 RX ADMIN — SODIUM BICARBONATE 650 MG TABLET 650 MG: at 08:07

## 2019-07-22 RX ADMIN — INSULIN ASPART 4 UNITS: 100 INJECTION, SOLUTION INTRAVENOUS; SUBCUTANEOUS at 11:07

## 2019-07-22 RX ADMIN — ISOSORBIDE MONONITRATE 30 MG: 30 TABLET, EXTENDED RELEASE ORAL at 09:07

## 2019-07-22 RX ADMIN — SODIUM BICARBONATE 650 MG TABLET 650 MG: at 09:07

## 2019-07-22 RX ADMIN — GABAPENTIN 300 MG: 300 CAPSULE ORAL at 03:07

## 2019-07-22 RX ADMIN — GABAPENTIN 300 MG: 300 CAPSULE ORAL at 08:07

## 2019-07-22 RX ADMIN — SODIUM CHLORIDE: 0.9 INJECTION, SOLUTION INTRAVENOUS at 08:07

## 2019-07-22 RX ADMIN — GABAPENTIN 300 MG: 300 CAPSULE ORAL at 09:07

## 2019-07-22 RX ADMIN — INSULIN ASPART 2 UNITS: 100 INJECTION, SOLUTION INTRAVENOUS; SUBCUTANEOUS at 08:07

## 2019-07-22 RX ADMIN — HEPARIN SODIUM 5000 UNITS: 5000 INJECTION, SOLUTION INTRAVENOUS; SUBCUTANEOUS at 05:07

## 2019-07-22 RX ADMIN — ATORVASTATIN CALCIUM 80 MG: 40 TABLET, FILM COATED ORAL at 11:07

## 2019-07-22 RX ADMIN — AMLODIPINE BESYLATE 10 MG: 10 TABLET ORAL at 09:07

## 2019-07-22 RX ADMIN — HYDROCODONE BITARTRATE AND ACETAMINOPHEN 1 TABLET: 10; 325 TABLET ORAL at 03:07

## 2019-07-22 RX ADMIN — PREDNISONE 5 MG: 5 TABLET ORAL at 09:07

## 2019-07-22 NOTE — PLAN OF CARE
Received consult for meals on wheels and discharge planning.  Patient was given information to call  for 10 fronzen meals to be provided by his [employer] [facility] upon discharge.  Also give him the phone number to  on aging to get on the meals on wheels program as well as laundry/housekeeping services if qualifies.    Discussed sending out home care.  Patient agreement to have [facility] for nursing and physical therapy services as long as there is no cost to him. Preference letter signed. Discussed [facility] will call and advised before visiting if there is a co-pay.          07/22/19 1143   Post-Acute Status   Post-Acute Authorization Home Health/Hospice   Post-Acute Placement Status Referrals Sent

## 2019-07-22 NOTE — PROGRESS NOTES
Patient called nurses station to state that his arm was swelling. PIV to left hand infiltrated and caused forearm to swell. Left PIV to hand removed due to infiltration with catheter in place. Infusion moved to right hand. Will continue to monitor. Patient advised to call nurses station if any pain or swelling occurs. Patient verbalized understanding.

## 2019-07-22 NOTE — PLAN OF CARE
Problem: Adult Inpatient Plan of Care  Goal: Patient-Specific Goal (Individualization)  Outcome: Ongoing (interventions implemented as appropriate)  Patient is awake, alert and oriented x4. He is neurologically intact. His vitals are WNL. He has accuchecks AC/HS. He has a right BKA and a  Left TMA. His grullon was removed with 300ml of urine in the bag. He has since voided 400ml spontaneously in the urinal. He has iv fluids going at 150ml/hr. He has complained of pain and has pain medication ordered. He is post STEMI from ICU on yesterday. His groin site on the right has pressure dressing on it that has been clean, dry and intact for this shift. He has not complained of any chest pain nor SOB. He has discharge planning ordered and the  has completed orders for home when other parameter have been met. He is waiting on BUN and Creatinine levels to stabilize. He also has had a kidney transplant.

## 2019-07-22 NOTE — PROGRESS NOTES
[facility] -   Cardiology  Progress Note    Patient Name: Lavelle Ladd  MRN: [id number]  Admission Date: 7/21/2019  Hospital Length of Stay: 1 days  Code Status: Prior   Attending Physician: Andrew Valdez MD   Primary Care Physician: Rishabh Esteban MD  Expected Discharge Date:   Principal Problem:Acute ST elevation myocardial infarction (STEMI) of anterolateral wall    Subjective:   Brief HPI:  Pt presents with cp and MI.  Pt has h/o CRI, HTN, PAD, CAD.  S/p [facility] 2015 w Dr. Hannah, 50% prox-mid LAD stenosis with med mgt advised.  Now presents by EMS with c/o chest pain over last few hours.  ecg showed NSR, acute anterolateral and inferior ST elevation.  Code STEMI activated.  Hospital Course:   7/22/19- Patient had uneventful night. No angina or equivalent. Noted to have bump in creatine to 1.7 this morning. Has no right femoral access complaints.         Review of Systems   Constitution: Negative for diaphoresis, malaise/fatigue, weight gain and weight loss.   HENT: Negative for congestion and nosebleeds.    Cardiovascular: Negative for chest pain, claudication, cyanosis, dyspnea on exertion, irregular heartbeat, leg swelling, near-syncope, orthopnea, palpitations, paroxysmal nocturnal dyspnea and syncope.   Respiratory: Negative for cough, hemoptysis, shortness of breath, sleep disturbances due to breathing, snoring, sputum production and wheezing.    Hematologic/Lymphatic: Negative for bleeding problem. Does not bruise/bleed easily.   Skin: Negative for rash.   Musculoskeletal: Negative for arthritis, back pain, falls, joint pain, muscle cramps and muscle weakness.   Gastrointestinal: Negative for abdominal pain, constipation, diarrhea, heartburn, hematemesis, hematochezia, melena, nausea and vomiting.   Genitourinary: Negative for dysuria, hematuria and nocturia.   Neurological: Negative for excessive daytime sleepiness, dizziness, headaches, light-headedness, loss of balance, numbness, vertigo  and weakness.     Objective:     Vital Signs (Most Recent):  Temp: 98.3 °F (36.8 °C) (07/22/19 0729)  Pulse: 88 (07/22/19 0729)  Resp: 18 (07/22/19 0729)  BP: (!) 153/94 (07/22/19 0729)  SpO2: 97 % (07/22/19 0729) Vital Signs (24h Range):  Temp:  [97.7 °F (36.5 °C)-98.7 °F (37.1 °C)] 98.3 °F (36.8 °C)  Pulse:  [76-99] 88  Resp:  [14-23] 18  SpO2:  [95 %-98 %] 97 %  BP: ()/(64-94) 153/94     Weight: 97.2 kg (214 lb 4.6 oz)  Body mass index is 29.89 kg/m².     SpO2: 97 %  O2 Device (Oxygen Therapy): room air      Intake/Output Summary (Last 24 hours) at 7/22/2019 1052  Last data filed at 7/22/2019 0800  Gross per 24 hour   Intake 756.66 ml   Output 1520 ml   Net -763.34 ml       Lines/Drains/Airways     Peripheral Intravenous Line                 Peripheral IV - Single Lumen 07/21/19 0251 18 G Left Antecubital 1 day         Peripheral IV - Single Lumen 07/21/19 18 G Left Wrist 1 day         Peripheral IV - Single Lumen 07/21/19 18 G Right Wrist 1 day                Physical Exam   Constitutional: He is oriented to person, place, and time. He appears well-developed and well-nourished.   Neck: Neck supple. No JVD present.   Cardiovascular: Normal rate, regular rhythm, normal heart sounds and normal pulses. Exam reveals no friction rub.   No murmur heard.  Pulmonary/Chest: Effort normal and breath sounds normal. No respiratory distress. He has no wheezes. He has no rales.   Abdominal: Soft. Bowel sounds are normal. He exhibits no distension.   Musculoskeletal: He exhibits no edema or tenderness.   Right BKA and left metatarsal amputation    Neurological: He is alert and oriented to person, place, and time.   Skin: Skin is warm and dry. No rash noted.   Right femoral access site without redness, tenderness, swelling, or drainage. There is no active external bleeding or hematoma.    Psychiatric: He has a normal mood and affect. His behavior is normal.   Nursing note and vitals reviewed.      Significant Labs:   All  pertinent lab results from the last 24 hours have been reviewed. and   Recent Lab Results       07/22/19  0437   07/21/19  1615   07/21/19  1217   07/21/19  1158        Albumin 2.6           Alkaline Phosphatase 78           ALT 10           Anion Gap 11           AST 16           Baso # 0.02           Basophil% 0.2           BILIRUBIN TOTAL 0.5  Comment:  For infants and newborns, interpretation of results should be based  on gestational age, weight and in agreement with clinical  observations.  Premature Infant recommended reference ranges:  Up to 24 hours.............<8.0 mg/dL  Up to 48 hours............<12.0 mg/dL  3-5 days..................<15.0 mg/dL  6-29 days.................<15.0 mg/dL             BUN, Bld 30           Calcium 9.3           Chloride 106           CO2 19           Creatinine 1.7           Differential Method Automated           eGFR if  48           eGFR if non  41  Comment:  Calculation used to obtain the estimated glomerular filtration  rate (eGFR) is the CKD-EPI equation.              Eos # 0.0           Eosinophil% 0.3           Glucose 108           Gran # (ANC) 6.4           Gran% 67.1           Hematocrit 43.6           Hemoglobin 14.4           Lymph # 1.9           Lymph% 20.4           MCH 29.7           MCHC 33.0           MCV 90           Mono # 1.2           Mono% 12.3           MPV 9.7           Platelets 272           POCT Glucose     117       Potassium 4.8           PROTEIN TOTAL 6.3           RBC 4.85           RDW 14.0           Sodium 136           Troponin I   9.346  Comment:  The reference interval for Troponin I represents the 99th percentile   cutoff   for our facility and is consistent with 3rd generation assay   performance.     7.172  Comment:  The reference interval for Troponin I represents the 99th percentile   cutoff   for our facility and is consistent with 3rd generation assay   performance.       WBC 9.52                  Significant Imaging: Echocardiogram:   2D echo with color flow doppler:   Results for orders placed or performed during the hospital encounter of 07/21/19   2D echo with color flow doppler   Result Value Ref Range    QEF 60 55 - 65    Narrative    Date of Procedure: 07/21/2019        TEST DESCRIPTION   Technical Quality: This is a technically challenging study. There is poor endocardial definition.     Aorta: The aortic root is normal in size, measuring 2.9 cm at sinotubular junction and 3.6 cm at Sinuses of Valsalva. The proximal ascending aorta is normal in size, measuring 2.7 cm across.     Left Atrium: The left atrial volume index is moderately enlarged, measuring 45.81 cc/m2.     Left Ventricle: The left ventricle is normal in size, with an end-diastolic diameter of 4.0 cm, and an end-systolic diameter of 2.4 cm. Posterior wall thickness is mildly increased, with the septum measuring 1.2 cm and the posterior wall measuring 1.4 cm   across. Relative wall thickness was increased at 0.70, and the LV mass index was 101.8 g/m2 consistent with concentric remodeling. The apex is akinetic.   Left ventricular systolic function appears normal. Visually estimated ejection fraction is 60-65%. The LV Doppler derived stroke volume equals 66.0 ccs.     Diastolic indices: E/e' ratio(avg) 10. Diastolic function is indeterminate.     Right Atrium: The right atrium is normal in size, measuring 5.5 cm in length and 2.8 cm in width in the apical view.     Right Ventricle: The right ventricle is normal in size. Global right ventricular systolic function appears normal.     Aortic Valve:  The aortic valve is normal in structure. The peak velocity obtained across the aortic valve is 1.06 m/s, which translates to a peak gradient of 4 mmHg. The mean gradient is 3 mmHg. Using a left ventricular outflow tract diameter of 2.4 cm,   a left ventricular outflow tract velocity time integral of 15 cm, and a peak instantaneous  transvalvular velocity time integral of 19 cm, the calculated aortic valve area is 3.59 cm2(AVAi is 1.67 cm2/m2).     Mitral Valve:  The mitral valve is normal in structure.     Tricuspid Valve:  The tricuspid valve is normal in structure.     Pulmonary Valve:  The pulmonic valve is not well seen.     IVC: IVC is normal in size and collapses > 50% with a sniff, suggesting normal right atrial pressure of 3 mmHg.     Intracavitary: There is no evidence of pericardial effusion, intracavity mass, thrombi, or vegetation.         CONCLUSIONS     1 - Normal left ventricular systolic function (EF 60-65%).     2 - Indeterminate LV diastolic function.     3 - Normal right ventricular systolic function .     4 - Wall motion abnormalities.     5 - Moderate left atrial enlargement.             This document has been electronically    SIGNED BY: Andrew Valdez MD On: 07/21/2019 11:21     Assessment and Plan:       * Acute ST elevation myocardial infarction (STEMI) of anterolateral wall  Patient is s/p [facility]. Noted to have occluded apical LAD, diffuse non obstructive CAD elsewhere . Recommendation made for medical management for findings.   Continue ASA, Plavix, Statin, BB, CCB and Imdur   No ACE/ARB due to renal function   Will give IVF and repeat creatine at 1400. If creatine down, will dc home and have patient follow up with Dr. Hannah           PAD (peripheral artery disease)  Occluded right SFA.   Continue ASA, Plavix and Statin     Essential hypertension  Continue metoprolol and amlodipine  No ACE or ARB due to renal function        VTE Risk Mitigation (From admission, onward)        Ordered     heparin (porcine) injection 5,000 Units  Every 8 hours      07/21/19 0738     Place sequential compression device  Until discontinued      07/21/19 0502         Chart reviewed. Patient examined by Dr. Hilton and agrees with plan that has been outlined.       Toribio Interiano, THIAGO  Cardiology  [facility]

## 2019-07-22 NOTE — PROGRESS NOTES
[facility] Hospital Medicine  Progress Note    Patient Name: Lavelle Ladd  MRN: [id number]  Patient Class: IP- Inpatient   Admission Date: 7/21/2019  Length of Stay: 1 days  Attending Physician: Andrew Valdez MD  Primary Care Provider: Rishabh Esteban MD        Subjective:     Principal Problem:Acute ST elevation myocardial infarction (STEMI) of anterolateral wall      HPI:  63-year-old male with significant medical comorbidities presented this evening with chest pain. Patient was evaluated in emergency room and classified as a STEMI was taken to the cath lab by Cardiology.  Patient is status post heart catheterization.  Hospital Medicine consulted for assistance with medical management    Overview/Hospital Course:  07/21 -STEMI S/P. Recommend maximal medical management for apical LAD occlusion (small tortuous vessel and very distal occlusion. EF 60%   Medical management -ASA/Statin /Plavix for 1 yr    07/22- he denies chest pain.  Lab data showed serum creatinine -17. He was seen by Cardiology and  Medical management is planned.    Interval History:   07/22- he denies chest pain.  Lab data showed serum creatinine -17. He was seen by Cardiology and  Medical management is planned.    Review of Systems   Constitutional: Negative for chills, diaphoresis, fatigue and fever.   HENT: Negative for congestion, sore throat and voice change.    Eyes: Negative for photophobia and visual disturbance.   Respiratory: Negative for cough, shortness of breath, wheezing and stridor.    Cardiovascular: Positive for chest pain. Negative for leg swelling.   Gastrointestinal: Negative for abdominal distention, abdominal pain, constipation, diarrhea, nausea and vomiting.   Endocrine: Negative for polydipsia, polyphagia and polyuria.   Genitourinary: Negative for difficulty urinating, dysuria, flank pain, testicular pain and urgency.   Musculoskeletal: Negative for back pain, joint swelling, neck pain and neck  stiffness.   Skin: Negative for color change and rash.   Allergic/Immunologic: Negative for immunocompromised state.   Neurological: Negative for dizziness, syncope, weakness, numbness and headaches.   Hematological: Does not bruise/bleed easily.   Psychiatric/Behavioral: Negative for agitation, behavioral problems and confusion.     Objective:     Vital Signs (Most Recent):  Temp: 97.9 °F (36.6 °C) (07/22/19 1534)  Pulse: 83 (07/22/19 1534)  Resp: 18 (07/22/19 1534)  BP: 111/79 (07/22/19 1534)  SpO2: (!) 93 % (07/22/19 1534) Vital Signs (24h Range):  Temp:  [97.9 °F (36.6 °C)-98.7 °F (37.1 °C)] 97.9 °F (36.6 °C)  Pulse:  [81-99] 83  Resp:  [16-20] 18  SpO2:  [93 %-97 %] 93 %  BP: (111-156)/(71-94) 111/79     Weight: 97.2 kg (214 lb 4.6 oz)  Body mass index is 29.89 kg/m².    Intake/Output Summary (Last 24 hours) at 7/22/2019 1829  Last data filed at 7/22/2019 1700  Gross per 24 hour   Intake --   Output 1250 ml   Net -1250 ml      Physical Exam   Constitutional: He is oriented to person, place, and time. He appears well-developed and well-nourished. He appears distressed.   HENT:   Head: Normocephalic and atraumatic.   Eyes: Pupils are equal, round, and reactive to light. EOM are normal.   Neck: Normal range of motion. Neck supple.   Cardiovascular: Normal rate and regular rhythm.   Pulmonary/Chest: Effort normal and breath sounds normal.   Abdominal: Soft. Bowel sounds are normal.   Musculoskeletal:   Lower extremity amputation noted   Neurological: He is alert and oriented to person, place, and time.   Skin: Skin is warm and dry.   Psychiatric: He has a normal mood and affect. His behavior is normal. Judgment and thought content normal.   Nursing note and vitals reviewed.      Significant Labs: All pertinent labs within the past 24 hours have been reviewed.    Significant Imaging: I have reviewed and interpreted all pertinent imaging results/findings within the past 24 hours.      Assessment/Plan:      * Acute ST  elevation myocardial infarction (STEMI) of anterolateral wall  Status post catheterization.      medical management for apical LAD occlusion (small tortuous vessel and very distal occlusion  ASA/Plavix for one year    PAD (peripheral artery disease)    - will continue Plavix, statin, aspirin      CAD in native artery  Beta-blocker, statin, Plavix, aspirin, blood pressure control  Cardiology managing  Further evaluation/diagnostics/interventions/consults pending course       Stage 3 chronic kidney disease  Stable monitor      - will closely monitor serum creatinine - avoid nephrotoxic meds    Type 2 diabetes mellitus with diabetic neuropathy, with long-term current use of insulin  SSI/ Accu-check      Type 2 diabetes mellitus with retinopathy, with long-term current use of insulin    - will continue insulin sliding scale , closely monitor glucose     donor kidney transplant for DM 14    Continue immunosuppressants     Essential hypertension  Monitor blood pressure  CCB, beta-blocker  Further evaluation/diagnostics/interventions/consults pending course         VTE Risk Mitigation (From admission, onward)        Ordered     heparin (porcine) injection 5,000 Units  Every 8 hours      19 0738     Place sequential compression device  Until discontinued      19 0501                Ronny Veliz MD  Department of Hospital Medicine   [facility]

## 2019-07-22 NOTE — PROGRESS NOTES
Bladder scan done on patient that showed 601ml in bladder. Secure message sent to provider and he states to re insert grullon. Patient is not complaining of any desire to urinate, nor is he having any pain or discomfort in regards to bladder area.

## 2019-07-22 NOTE — ASSESSMENT & PLAN NOTE
Patient is s/p [facility]. Noted to have occluded apical LAD, diffuse non obstructive CAD elsewhere . Recommendation made for medical management for findings.   Continue ASA, Plavix, Statin, BB, CCB and Imdur   No ACE/ARB due to renal function   Will give IVF and repeat creatine at 1400. If creatine down, will dc home and have patient follow up with Dr. Hannah

## 2019-07-22 NOTE — PROGRESS NOTES
Grullon catheter removed per pts primary nurse, Princess DAVIS. Pt due to void. Post grullon removal orders in place. Pt verbalized understanding to call for help with bathroom assistance and upon first void. Urinal placed with in pts reach as well as call bell. Bed alarm activated

## 2019-07-22 NOTE — PLAN OF CARE
Problem: Adult Inpatient Plan of Care  Goal: Patient-Specific Goal (Individualization)  Outcome: Ongoing (interventions implemented as appropriate)  Pt AAO x4.  VSS  Pt able to make needs known.  Pt remained afebrile throughout this shift.   Pt up to bedside commode.   Pt remained free of falls this shift.   Pt denies pain this shift.  Plan of care reviewed. Patient verbalizes understanding.   Pt moving/turing independent. Frequent weight shifting encouraged.  Patient sinus rhythm on monitor.   Bed low, side rails up x 2, wheels locked, call light in reach.   Hourly rounding completed.   Will continue to monitor.

## 2019-07-22 NOTE — SUBJECTIVE & OBJECTIVE
Interval History:   07/22- he denies chest pain.  Lab data showed serum creatinine -17. He was seen by Cardiology and  Medical management is planned.    Review of Systems   Constitutional: Negative for chills, diaphoresis, fatigue and fever.   HENT: Negative for congestion, sore throat and voice change.    Eyes: Negative for photophobia and visual disturbance.   Respiratory: Negative for cough, shortness of breath, wheezing and stridor.    Cardiovascular: Positive for chest pain. Negative for leg swelling.   Gastrointestinal: Negative for abdominal distention, abdominal pain, constipation, diarrhea, nausea and vomiting.   Endocrine: Negative for polydipsia, polyphagia and polyuria.   Genitourinary: Negative for difficulty urinating, dysuria, flank pain, testicular pain and urgency.   Musculoskeletal: Negative for back pain, joint swelling, neck pain and neck stiffness.   Skin: Negative for color change and rash.   Allergic/Immunologic: Negative for immunocompromised state.   Neurological: Negative for dizziness, syncope, weakness, numbness and headaches.   Hematological: Does not bruise/bleed easily.   Psychiatric/Behavioral: Negative for agitation, behavioral problems and confusion.     Objective:     Vital Signs (Most Recent):  Temp: 97.9 °F (36.6 °C) (07/22/19 1534)  Pulse: 83 (07/22/19 1534)  Resp: 18 (07/22/19 1534)  BP: 111/79 (07/22/19 1534)  SpO2: (!) 93 % (07/22/19 1534) Vital Signs (24h Range):  Temp:  [97.9 °F (36.6 °C)-98.7 °F (37.1 °C)] 97.9 °F (36.6 °C)  Pulse:  [81-99] 83  Resp:  [16-20] 18  SpO2:  [93 %-97 %] 93 %  BP: (111-156)/(71-94) 111/79     Weight: 97.2 kg (214 lb 4.6 oz)  Body mass index is 29.89 kg/m².    Intake/Output Summary (Last 24 hours) at 7/22/2019 9211  Last data filed at 7/22/2019 1700  Gross per 24 hour   Intake --   Output 1250 ml   Net -1250 ml      Physical Exam   Constitutional: He is oriented to person, place, and time. He appears well-developed and well-nourished. He appears  distressed.   HENT:   Head: Normocephalic and atraumatic.   Eyes: Pupils are equal, round, and reactive to light. EOM are normal.   Neck: Normal range of motion. Neck supple.   Cardiovascular: Normal rate and regular rhythm.   Pulmonary/Chest: Effort normal and breath sounds normal.   Abdominal: Soft. Bowel sounds are normal.   Musculoskeletal:   Lower extremity amputation noted   Neurological: He is alert and oriented to person, place, and time.   Skin: Skin is warm and dry.   Psychiatric: He has a normal mood and affect. His behavior is normal. Judgment and thought content normal.   Nursing note and vitals reviewed.      Significant Labs: All pertinent labs within the past 24 hours have been reviewed.    Significant Imaging: I have reviewed and interpreted all pertinent imaging results/findings within the past 24 hours.

## 2019-07-22 NOTE — SUBJECTIVE & OBJECTIVE
Review of Systems   Constitution: Negative for diaphoresis, malaise/fatigue, weight gain and weight loss.   HENT: Negative for congestion and nosebleeds.    Cardiovascular: Negative for chest pain, claudication, cyanosis, dyspnea on exertion, irregular heartbeat, leg swelling, near-syncope, orthopnea, palpitations, paroxysmal nocturnal dyspnea and syncope.   Respiratory: Negative for cough, hemoptysis, shortness of breath, sleep disturbances due to breathing, snoring, sputum production and wheezing.    Hematologic/Lymphatic: Negative for bleeding problem. Does not bruise/bleed easily.   Skin: Negative for rash.   Musculoskeletal: Negative for arthritis, back pain, falls, joint pain, muscle cramps and muscle weakness.   Gastrointestinal: Negative for abdominal pain, constipation, diarrhea, heartburn, hematemesis, hematochezia, melena, nausea and vomiting.   Genitourinary: Negative for dysuria, hematuria and nocturia.   Neurological: Negative for excessive daytime sleepiness, dizziness, headaches, light-headedness, loss of balance, numbness, vertigo and weakness.     Objective:     Vital Signs (Most Recent):  Temp: 98.3 °F (36.8 °C) (07/22/19 0729)  Pulse: 88 (07/22/19 0729)  Resp: 18 (07/22/19 0729)  BP: (!) 153/94 (07/22/19 0729)  SpO2: 97 % (07/22/19 0729) Vital Signs (24h Range):  Temp:  [97.7 °F (36.5 °C)-98.7 °F (37.1 °C)] 98.3 °F (36.8 °C)  Pulse:  [76-99] 88  Resp:  [14-23] 18  SpO2:  [95 %-98 %] 97 %  BP: ()/(64-94) 153/94     Weight: 97.2 kg (214 lb 4.6 oz)  Body mass index is 29.89 kg/m².     SpO2: 97 %  O2 Device (Oxygen Therapy): room air      Intake/Output Summary (Last 24 hours) at 7/22/2019 1052  Last data filed at 7/22/2019 0800  Gross per 24 hour   Intake 756.66 ml   Output 1520 ml   Net -763.34 ml       Lines/Drains/Airways     Peripheral Intravenous Line                 Peripheral IV - Single Lumen 07/21/19 0251 18 G Left Antecubital 1 day         Peripheral IV - Single Lumen 07/21/19 18  G Left Wrist 1 day         Peripheral IV - Single Lumen 07/21/19 18 G Right Wrist 1 day                Physical Exam   Constitutional: He is oriented to person, place, and time. He appears well-developed and well-nourished.   Neck: Neck supple. No JVD present.   Cardiovascular: Normal rate, regular rhythm, normal heart sounds and normal pulses. Exam reveals no friction rub.   No murmur heard.  Pulmonary/Chest: Effort normal and breath sounds normal. No respiratory distress. He has no wheezes. He has no rales.   Abdominal: Soft. Bowel sounds are normal. He exhibits no distension.   Musculoskeletal: He exhibits no edema or tenderness.   Right BKA and left metatarsal amputation    Neurological: He is alert and oriented to person, place, and time.   Skin: Skin is warm and dry. No rash noted.   Right femoral access site without redness, tenderness, swelling, or drainage. There is no active external bleeding or hematoma.    Psychiatric: He has a normal mood and affect. His behavior is normal.   Nursing note and vitals reviewed.      Significant Labs:   All pertinent lab results from the last 24 hours have been reviewed. and   Recent Lab Results       07/22/19  0437   07/21/19  1615   07/21/19  1217   07/21/19  1158        Albumin 2.6           Alkaline Phosphatase 78           ALT 10           Anion Gap 11           AST 16           Baso # 0.02           Basophil% 0.2           BILIRUBIN TOTAL 0.5  Comment:  For infants and newborns, interpretation of results should be based  on gestational age, weight and in agreement with clinical  observations.  Premature Infant recommended reference ranges:  Up to 24 hours.............<8.0 mg/dL  Up to 48 hours............<12.0 mg/dL  3-5 days..................<15.0 mg/dL  6-29 days.................<15.0 mg/dL             BUN, Bld 30           Calcium 9.3           Chloride 106           CO2 19           Creatinine 1.7           Differential Method Automated           eGFR if   American 48           eGFR if non  41  Comment:  Calculation used to obtain the estimated glomerular filtration  rate (eGFR) is the CKD-EPI equation.              Eos # 0.0           Eosinophil% 0.3           Glucose 108           Gran # (ANC) 6.4           Gran% 67.1           Hematocrit 43.6           Hemoglobin 14.4           Lymph # 1.9           Lymph% 20.4           MCH 29.7           MCHC 33.0           MCV 90           Mono # 1.2           Mono% 12.3           MPV 9.7           Platelets 272           POCT Glucose     117       Potassium 4.8           PROTEIN TOTAL 6.3           RBC 4.85           RDW 14.0           Sodium 136           Troponin I   9.346  Comment:  The reference interval for Troponin I represents the 99th percentile   cutoff   for our facility and is consistent with 3rd generation assay   performance.     7.172  Comment:  The reference interval for Troponin I represents the 99th percentile   cutoff   for our facility and is consistent with 3rd generation assay   performance.       WBC 9.52                 Significant Imaging: Echocardiogram:   2D echo with color flow doppler:   Results for orders placed or performed during the hospital encounter of 07/21/19   2D echo with color flow doppler   Result Value Ref Range    QEF 60 55 - 65    Narrative    Date of Procedure: 07/21/2019        TEST DESCRIPTION   Technical Quality: This is a technically challenging study. There is poor endocardial definition.     Aorta: The aortic root is normal in size, measuring 2.9 cm at sinotubular junction and 3.6 cm at Sinuses of Valsalva. The proximal ascending aorta is normal in size, measuring 2.7 cm across.     Left Atrium: The left atrial volume index is moderately enlarged, measuring 45.81 cc/m2.     Left Ventricle: The left ventricle is normal in size, with an end-diastolic diameter of 4.0 cm, and an end-systolic diameter of 2.4 cm. Posterior wall thickness is mildly increased, with the  septum measuring 1.2 cm and the posterior wall measuring 1.4 cm   across. Relative wall thickness was increased at 0.70, and the LV mass index was 101.8 g/m2 consistent with concentric remodeling. The apex is akinetic.   Left ventricular systolic function appears normal. Visually estimated ejection fraction is 60-65%. The LV Doppler derived stroke volume equals 66.0 ccs.     Diastolic indices: E/e' ratio(avg) 10. Diastolic function is indeterminate.     Right Atrium: The right atrium is normal in size, measuring 5.5 cm in length and 2.8 cm in width in the apical view.     Right Ventricle: The right ventricle is normal in size. Global right ventricular systolic function appears normal.     Aortic Valve:  The aortic valve is normal in structure. The peak velocity obtained across the aortic valve is 1.06 m/s, which translates to a peak gradient of 4 mmHg. The mean gradient is 3 mmHg. Using a left ventricular outflow tract diameter of 2.4 cm,   a left ventricular outflow tract velocity time integral of 15 cm, and a peak instantaneous transvalvular velocity time integral of 19 cm, the calculated aortic valve area is 3.59 cm2(AVAi is 1.67 cm2/m2).     Mitral Valve:  The mitral valve is normal in structure.     Tricuspid Valve:  The tricuspid valve is normal in structure.     Pulmonary Valve:  The pulmonic valve is not well seen.     IVC: IVC is normal in size and collapses > 50% with a sniff, suggesting normal right atrial pressure of 3 mmHg.     Intracavitary: There is no evidence of pericardial effusion, intracavity mass, thrombi, or vegetation.         CONCLUSIONS     1 - Normal left ventricular systolic function (EF 60-65%).     2 - Indeterminate LV diastolic function.     3 - Normal right ventricular systolic function .     4 - Wall motion abnormalities.     5 - Moderate left atrial enlargement.             This document has been electronically    SIGNED BY: Andrew Valdez MD On: 07/21/2019 11:21

## 2019-07-22 NOTE — HPI
Pt presents with cp and MI.  Pt has h/o CRI, HTN, PAD, CAD.  S/p [facility] 2015 w Dr. Hannah, 50% prox-mid LAD stenosis with med mgt advised.  Now presents by EMS with c/o chest pain over last few hours.  ecg showed NSR, acute anterolateral and inferior ST elevation.  Code STEMI activated.

## 2019-07-22 NOTE — NURSING
Chart reviewed for diabetes  patient has been seen by this nurse on previous admit  glucose stable    No further action unless diabetes educational needs are identified

## 2019-07-22 NOTE — PROGRESS NOTES
Current EKG placed into pts chart at this time. Notified cardiology Pa of interpretation. She will review.

## 2019-07-22 NOTE — HOSPITAL COURSE
7/22/19- Patient had uneventful night. No angina or equivalent. Noted to have bump in creatine to 1.7 this morning. Has no right femoral access complaints.

## 2019-07-23 ENCOUNTER — TELEPHONE (OUTPATIENT)
Dept: TRANSPLANT | Facility: CLINIC | Age: 64
End: 2019-07-23

## 2019-07-23 VITALS
RESPIRATION RATE: 18 BRPM | OXYGEN SATURATION: 96 % | TEMPERATURE: 97 F | HEIGHT: 71 IN | BODY MASS INDEX: 30 KG/M2 | DIASTOLIC BLOOD PRESSURE: 72 MMHG | HEART RATE: 84 BPM | WEIGHT: 214.31 LBS | SYSTOLIC BLOOD PRESSURE: 153 MMHG

## 2019-07-23 PROBLEM — I21.3 STEMI (ST ELEVATION MYOCARDIAL INFARCTION): Status: ACTIVE | Noted: 2019-07-21

## 2019-07-23 LAB
ALBUMIN SERPL BCP-MCNC: 2.5 G/DL (ref 3.5–5.2)
ALP SERPL-CCNC: 76 U/L (ref 55–135)
ALT SERPL W/O P-5'-P-CCNC: 10 U/L (ref 10–44)
ANION GAP SERPL CALC-SCNC: 10 MMOL/L (ref 8–16)
AST SERPL-CCNC: 13 U/L (ref 10–40)
BASOPHILS # BLD AUTO: 0.01 K/UL (ref 0–0.2)
BASOPHILS NFR BLD: 0.2 % (ref 0–1.9)
BILIRUB SERPL-MCNC: 0.4 MG/DL (ref 0.1–1)
BUN SERPL-MCNC: 23 MG/DL (ref 8–23)
CALCIUM SERPL-MCNC: 8.8 MG/DL (ref 8.7–10.5)
CHLORIDE SERPL-SCNC: 111 MMOL/L (ref 95–110)
CO2 SERPL-SCNC: 19 MMOL/L (ref 23–29)
CREAT SERPL-MCNC: 1.4 MG/DL (ref 0.5–1.4)
DIFFERENTIAL METHOD: ABNORMAL
EOSINOPHIL # BLD AUTO: 0.1 K/UL (ref 0–0.5)
EOSINOPHIL NFR BLD: 1.1 % (ref 0–8)
ERYTHROCYTE [DISTWIDTH] IN BLOOD BY AUTOMATED COUNT: 14.1 % (ref 11.5–14.5)
EST. GFR  (AFRICAN AMERICAN): >60 ML/MIN/1.73 M^2
EST. GFR  (NON AFRICAN AMERICAN): 52 ML/MIN/1.73 M^2
GLUCOSE SERPL-MCNC: 125 MG/DL (ref 70–110)
HCT VFR BLD AUTO: 41 % (ref 40–54)
HGB BLD-MCNC: 13.5 G/DL (ref 14–18)
LYMPHOCYTES # BLD AUTO: 1.1 K/UL (ref 1–4.8)
LYMPHOCYTES NFR BLD: 17.8 % (ref 18–48)
MCH RBC QN AUTO: 29.8 PG (ref 27–31)
MCHC RBC AUTO-ENTMCNC: 32.9 G/DL (ref 32–36)
MCV RBC AUTO: 91 FL (ref 82–98)
MONOCYTES # BLD AUTO: 0.9 K/UL (ref 0.3–1)
MONOCYTES NFR BLD: 13.4 % (ref 4–15)
NEUTROPHILS # BLD AUTO: 4.3 K/UL (ref 1.8–7.7)
NEUTROPHILS NFR BLD: 67.7 % (ref 38–73)
PLATELET # BLD AUTO: 280 K/UL (ref 150–350)
PMV BLD AUTO: 9.5 FL (ref 9.2–12.9)
POCT GLUCOSE: 131 MG/DL (ref 70–110)
POCT GLUCOSE: 226 MG/DL (ref 70–110)
POTASSIUM SERPL-SCNC: 4 MMOL/L (ref 3.5–5.1)
PROT SERPL-MCNC: 6 G/DL (ref 6–8.4)
RBC # BLD AUTO: 4.53 M/UL (ref 4.6–6.2)
SODIUM SERPL-SCNC: 140 MMOL/L (ref 136–145)
WBC # BLD AUTO: 6.35 K/UL (ref 3.9–12.7)

## 2019-07-23 PROCEDURE — 99238 PR HOSPITAL DISCHARGE DAY,<30 MIN: ICD-10-PCS | Mod: HCNC,,, | Performed by: INTERNAL MEDICINE

## 2019-07-23 PROCEDURE — 25000003 PHARM REV CODE 250: Mod: HCNC | Performed by: INTERNAL MEDICINE

## 2019-07-23 PROCEDURE — 63600175 PHARM REV CODE 636 W HCPCS: Mod: HCNC | Performed by: INTERNAL MEDICINE

## 2019-07-23 PROCEDURE — 25000003 PHARM REV CODE 250: Mod: HCNC | Performed by: NURSE PRACTITIONER

## 2019-07-23 PROCEDURE — 94761 N-INVAS EAR/PLS OXIMETRY MLT: CPT | Mod: HCNC

## 2019-07-23 PROCEDURE — 94799 UNLISTED PULMONARY SVC/PX: CPT | Mod: HCNC

## 2019-07-23 PROCEDURE — 99238 HOSP IP/OBS DSCHRG MGMT 30/<: CPT | Mod: HCNC,,, | Performed by: INTERNAL MEDICINE

## 2019-07-23 PROCEDURE — 80053 COMPREHEN METABOLIC PANEL: CPT | Mod: HCNC

## 2019-07-23 PROCEDURE — 36415 COLL VENOUS BLD VENIPUNCTURE: CPT | Mod: HCNC

## 2019-07-23 PROCEDURE — 85025 COMPLETE CBC W/AUTO DIFF WBC: CPT | Mod: HCNC

## 2019-07-23 RX ORDER — ATORVASTATIN CALCIUM 80 MG/1
80 TABLET, FILM COATED ORAL DAILY
Qty: 30 TABLET | Refills: 11 | Status: SHIPPED | OUTPATIENT
Start: 2019-07-24 | End: 2020-08-24 | Stop reason: SDUPTHER

## 2019-07-23 RX ORDER — CLOPIDOGREL BISULFATE 75 MG/1
75 TABLET ORAL DAILY
Qty: 30 TABLET | Refills: 11 | Status: SHIPPED | OUTPATIENT
Start: 2019-07-24 | End: 2020-09-16 | Stop reason: SDUPTHER

## 2019-07-23 RX ORDER — ISOSORBIDE MONONITRATE 30 MG/1
30 TABLET, EXTENDED RELEASE ORAL DAILY
Qty: 30 TABLET | Refills: 11 | Status: SHIPPED | OUTPATIENT
Start: 2019-07-24 | End: 2019-12-14 | Stop reason: SDUPTHER

## 2019-07-23 RX ADMIN — HYDRALAZINE HYDROCHLORIDE 10 MG: 20 INJECTION, SOLUTION INTRAMUSCULAR; INTRAVENOUS at 05:07

## 2019-07-23 RX ADMIN — MYCOPHENOLATE MOFETIL 500 MG: 500 TABLET, FILM COATED ORAL at 09:07

## 2019-07-23 RX ADMIN — ATORVASTATIN CALCIUM 80 MG: 40 TABLET, FILM COATED ORAL at 09:07

## 2019-07-23 RX ADMIN — GABAPENTIN 300 MG: 300 CAPSULE ORAL at 09:07

## 2019-07-23 RX ADMIN — METOPROLOL TARTRATE 25 MG: 25 TABLET ORAL at 09:07

## 2019-07-23 RX ADMIN — ISOSORBIDE MONONITRATE 30 MG: 30 TABLET, EXTENDED RELEASE ORAL at 09:07

## 2019-07-23 RX ADMIN — HYDRALAZINE HYDROCHLORIDE 10 MG: 20 INJECTION, SOLUTION INTRAMUSCULAR; INTRAVENOUS at 12:07

## 2019-07-23 RX ADMIN — GUAIFENESIN AND DEXTROMETHORPHAN 10 ML: 100; 10 SYRUP ORAL at 12:07

## 2019-07-23 RX ADMIN — CLOPIDOGREL BISULFATE 75 MG: 75 TABLET ORAL at 09:07

## 2019-07-23 RX ADMIN — INSULIN ASPART 4 UNITS: 100 INJECTION, SOLUTION INTRAVENOUS; SUBCUTANEOUS at 11:07

## 2019-07-23 RX ADMIN — MORPHINE SULFATE 2 MG: 2 INJECTION, SOLUTION INTRAMUSCULAR; INTRAVENOUS at 09:07

## 2019-07-23 RX ADMIN — AMLODIPINE BESYLATE 10 MG: 10 TABLET ORAL at 09:07

## 2019-07-23 RX ADMIN — ASPIRIN 81 MG: 81 TABLET, COATED ORAL at 09:07

## 2019-07-23 RX ADMIN — HYDROCODONE BITARTRATE AND ACETAMINOPHEN 1 TABLET: 10; 325 TABLET ORAL at 05:07

## 2019-07-23 RX ADMIN — PREDNISONE 5 MG: 5 TABLET ORAL at 09:07

## 2019-07-23 RX ADMIN — PANTOPRAZOLE SODIUM 40 MG: 40 TABLET, DELAYED RELEASE ORAL at 09:07

## 2019-07-23 RX ADMIN — SODIUM BICARBONATE 650 MG TABLET 650 MG: at 09:07

## 2019-07-23 RX ADMIN — SODIUM CHLORIDE: 0.9 INJECTION, SOLUTION INTRAVENOUS at 05:07

## 2019-07-23 NOTE — DISCHARGE SUMMARY
Discharge Note  Short Stay      SUMMARY     Admit Date: 7/21/2019    Attending Physician: Andrew Valdez MD     Discharge Physician: THIAGO Burnett     Discharge Date:  7/23/13    Final Diagnosis: CAD    Outcome of Stay:  Patient presented by EMS with c/o chest pain over last few hours.  ecg showed NSR, acute anterolateral and inferior ST elevation.  Code STEMI activated.  Cath showed occluded apical LAD, diffuse non obstructive CAD elsewhere . Recommendation made for medical management for findings.   Patient hydrated with IVF the day after angiogram due to bump in creatine to 1.7. This is also why no ACEI ordered   Will be DC'd home with ASA, Plavix, Statin, BB, CCB and Imdur   Has been counseled on post angiogram restrictions and verbalized an understanding     Disposition: Home or Self Care    Patient Instructions:   Current Discharge Medication List      START taking these medications    Details   atorvastatin (LIPITOR) 80 MG tablet Take 1 tablet (80 mg total) by mouth once daily.  Qty: 30 tablet, Refills: 11      clopidogrel (PLAVIX) 75 mg tablet Take 1 tablet (75 mg total) by mouth once daily.  Qty: 30 tablet, Refills: 11      isosorbide mononitrate (IMDUR) 30 MG 24 hr tablet Take 1 tablet (30 mg total) by mouth once daily.  Qty: 30 tablet, Refills: 11         CONTINUE these medications which have NOT CHANGED    Details   amLODIPine (NORVASC) 10 MG tablet Take 1 tablet (10 mg total) by mouth once daily.  Qty: 30 tablet, Refills: 0      aspirin (ECOTRIN) 81 MG EC tablet Take 1 tablet (81 mg total) by mouth once daily.  Refills: 0      BD INSULIN SYRINGE ULTRA-FINE 1 mL 31 gauge x 5/16 Syrg USE ONE AS DIRECTED FOUR TIMES DAILY WITH MEALS AND AT BEDTIME  Qty: 120 each, Refills: 10      blood sugar diagnostic Strp 1 each by Misc.(Non-Drug; Combo Route) route 3 (three) times daily.  Qty: 100 each, Refills: 11      blood-glucose meter kit Use as instructed  Qty: 1 each, Refills: 0     "  budesonide-formoterol 160-4.5 mcg (SYMBICORT) 160-4.5 mcg/actuation HFAA Inhale 2 puffs into the lungs every 12 (twelve) hours. Wash out mouth after using  Qty: 1 Inhaler, Refills: 12    Associated Diagnoses: Asthma with COPD      ergocalciferol (ERGOCALCIFEROL) 50,000 unit Cap Take 1 capsule (50,000 Units total) by mouth every 7 days. Take on Mondays  Qty: 4 capsule, Refills: 11      flash glucose scanning reader (FREESTYLE BRYAN 14 DAY READER) Misc 1 Device by Misc.(Non-Drug; Combo Route) route as needed.  Qty: 1 each, Refills: 0      flash glucose sensor (FREESTYLE BRYAN 14 DAY SENSOR) Kit 1 kit by Misc.(Non-Drug; Combo Route) route every 14 (fourteen) days.  Qty: 2 kit, Refills: 11      gabapentin (NEURONTIN) 300 MG capsule Take 1 capsule (300 mg total) by mouth 3 (three) times daily. for 10 days  Qty: 30 capsule, Refills: 0      HYDROcodone-acetaminophen (NORCO)  mg per tablet       hydrOXYzine (ATARAX) 50 MG tablet       liraglutide 0.6 mg/0.1 mL, 18 mg/3 mL, subq PNIJ (VICTOZA 2-KOKI) 0.6 mg/0.1 mL (18 mg/3 mL) PnIj Inject 1.8 mg into the skin once daily.  Qty: 9 mL, Refills: 11      metoprolol tartrate (LOPRESSOR) 25 MG tablet Take 1 tablet (25 mg total) by mouth 2 (two) times daily.  Qty: 60 tablet, Refills: 11      mycophenolate (CELLCEPT) 500 mg Tab Take 1 tablet (500 mg total) by mouth 2 (two) times daily.  Qty: 120 tablet, Refills: 0      ondansetron (ZOFRAN-ODT) 4 MG TbDL Take 1 tablet (4 mg total) by mouth every 8 (eight) hours as needed.  Qty: 21 tablet, Refills: 1      pen needle, diabetic (SURE-FINE PEN NEEDLES) 31 gauge x 3/16" Ndle 1 each by Misc.(Non-Drug; Combo Route) route 4 (four) times daily with meals and nightly.  Qty: 120 each, Refills: 11      predniSONE (DELTASONE) 5 MG tablet Take 1 tablet (5 mg total) by mouth once daily.  Qty: 30 tablet, Refills: 5    Associated Diagnoses: Kidney replaced by transplant      sodium bicarbonate 650 MG tablet Take 4 tablets (2,600 mg total) by " mouth 2 (two) times daily.  Qty: 240 tablet, Refills: 11      tacrolimus (PROGRAF) 0.5 MG Cap Take 3 capsules (1.5 mg total) by mouth every 12 (twelve) hours. Z94.0 Kidney Transplant  Qty: 180 capsule, Refills: 0    Comments: KTx 5/21/16; Z94.0      TRESIBA FLEXTOUCH U-100 100 unit/mL (3 mL) InPn Inject 30 Units into the skin 2 (two) times daily.             Discharge Procedure Orders (must include Diet, Follow-up, Activity)   Discharge Procedure Orders (must include Diet, Follow-up, Activity)   Diet Cardiac     Lifting restrictions   Order Comments: No lifting greater than 10 lbs x 1 week     Notify your health care provider if you experience any of the following:  temperature >100.4     Notify your health care provider if you experience any of the following:  persistent nausea and vomiting or diarrhea     Notify your health care provider if you experience any of the following:  severe uncontrolled pain     Notify your health care provider if you experience any of the following:  redness, tenderness, or signs of infection (pain, swelling, redness, odor or green/yellow discharge around incision site)     Notify your health care provider if you experience any of the following:  difficulty breathing or increased cough     Notify your health care provider if you experience any of the following:  severe persistent headache     Notify your health care provider if you experience any of the following:  worsening rash     Notify your health care provider if you experience any of the following:  persistent dizziness, light-headedness, or visual disturbances     Remove dressing in 24 hours     Activity as tolerated     Shower on day dressing removed (No bath)     Follow-up Information     [facility] [place] In 1 day.    Specialty:  Home Health Services  Why:  Home Health  Contact information:  [address], [address]  [place] [address]  [phone]             Umang Hannah MD In 1 week.     Specialties:  Cardiology, INTERVENTIONAL CARDIOLOGY  Why:  Hospital follow up. Clinic will call to arrange appt   Contact information:  [address] [place] [address] [phone]

## 2019-07-23 NOTE — PLAN OF CARE
[facility] accepted.       07/23/19 0950   Post-Acute Status   Post-Acute Authorization Home Health/Hospice   Post-Acute Placement Status Set-up Complete     Spoke with patient, advised all home health set up complete. Advised we set up dermatology appointment.  He stated he understands that it is important go see about the lesion.

## 2019-07-23 NOTE — NURSING
Went over discharge instructions with patient.   Stressed importance of making and keeping all follow ups as well as making prescribed medication changes.   Prescriptions x3 to be delivered to pt bedside via [facility] prior to discharge.  Patient verbalized understanding and has no questions in regards to discharge.  IVs removed, catheters intact.  Telemetry box 7358 removed and returned to monitor tech.  Patient awaiting personal transportation and med delivery, instructed to call nurse station once ride and meds have arrived.  Primary nurse notified of pt's discharge status.

## 2019-07-23 NOTE — PLAN OF CARE
Problem: Adult Inpatient Plan of Care  Goal: Patient-Specific Goal (Individualization)  Outcome: Ongoing (interventions implemented as appropriate)  Pt in bed AAOx4. Plan of Care reviewed, Verbalized understanding.   Patient remained free from falls, fall precautions in place.  Patient is NSR on monitor.VSS.  Oxygen therapy in use with nasal cannula @2L/min PRN. Pt free of pain.   Glucose monitoring. Blood pressure monitoring. Jac in BP earlier in shift so hydralazine IV administered and reassessed.   IV fluids administered. PO fluids encouraged.  Heparin subcu, held due to abnormally high APTT.  Call bell and personal belongings within reach. Hourly rounding complete. Reminded to call for assistance. No complaints at this time. Will continue to monitor.

## 2019-07-23 NOTE — TELEPHONE ENCOUNTER
Patient contacted office to reschedule his 36mo post txp visit, states he had a recent MI and is not up to his apt this upcoming Monday.   Leti given patient's info to contact him and reschedule labs/visit.

## 2019-07-24 ENCOUNTER — HOSPITAL ENCOUNTER (EMERGENCY)
Facility: HOSPITAL | Age: 64
Discharge: HOME | End: 2019-07-24
Attending: EMERGENCY MEDICINE
Payer: MEDICARE

## 2019-07-24 VITALS
WEIGHT: 214.31 LBS | HEART RATE: 103 BPM | TEMPERATURE: 100 F | OXYGEN SATURATION: 95 % | HEIGHT: 71 IN | BODY MASS INDEX: 30 KG/M2 | RESPIRATION RATE: 20 BRPM | SYSTOLIC BLOOD PRESSURE: 159 MMHG | DIASTOLIC BLOOD PRESSURE: 70 MMHG

## 2019-07-24 DIAGNOSIS — Z98.890 HISTORY OF LEFT HEART CATHETERIZATION: ICD-10-CM

## 2019-07-24 DIAGNOSIS — R53.1 GENERALIZED WEAKNESS: Primary | ICD-10-CM

## 2019-07-24 DIAGNOSIS — E86.1 INTRAVASCULAR VOLUME DEPLETION: ICD-10-CM

## 2019-07-24 DIAGNOSIS — N28.9 ACUTE RENAL INSUFFICIENCY: ICD-10-CM

## 2019-07-24 DIAGNOSIS — I25.2 HISTORY OF ST ELEVATION MYOCARDIAL INFARCTION (STEMI): ICD-10-CM

## 2019-07-24 DIAGNOSIS — R53.1 WEAKNESS: ICD-10-CM

## 2019-07-24 LAB
ALBUMIN SERPL BCP-MCNC: 2.5 G/DL (ref 3.5–5.2)
ALP SERPL-CCNC: 78 U/L (ref 55–135)
ALT SERPL W/O P-5'-P-CCNC: 12 U/L (ref 10–44)
ANION GAP SERPL CALC-SCNC: 11 MMOL/L (ref 8–16)
APTT BLDCRRT: 29.8 SEC (ref 21–32)
AST SERPL-CCNC: 14 U/L (ref 10–40)
BASOPHILS # BLD AUTO: 0.01 K/UL (ref 0–0.2)
BASOPHILS NFR BLD: 0.1 % (ref 0–1.9)
BILIRUB SERPL-MCNC: 0.9 MG/DL (ref 0.1–1)
BUN SERPL-MCNC: 14 MG/DL (ref 8–23)
CALCIUM SERPL-MCNC: 8.8 MG/DL (ref 8.7–10.5)
CHLORIDE SERPL-SCNC: 104 MMOL/L (ref 95–110)
CK SERPL-CCNC: 34 U/L (ref 20–200)
CO2 SERPL-SCNC: 20 MMOL/L (ref 23–29)
CREAT SERPL-MCNC: 1.7 MG/DL (ref 0.5–1.4)
DIFFERENTIAL METHOD: ABNORMAL
EOSINOPHIL # BLD AUTO: 0 K/UL (ref 0–0.5)
EOSINOPHIL NFR BLD: 0.1 % (ref 0–8)
ERYTHROCYTE [DISTWIDTH] IN BLOOD BY AUTOMATED COUNT: 13.9 % (ref 11.5–14.5)
EST. GFR  (AFRICAN AMERICAN): 48 ML/MIN/1.73 M^2
EST. GFR  (NON AFRICAN AMERICAN): 41 ML/MIN/1.73 M^2
GLUCOSE SERPL-MCNC: 210 MG/DL (ref 70–110)
HCT VFR BLD AUTO: 38.2 % (ref 40–54)
HGB BLD-MCNC: 12.5 G/DL (ref 14–18)
INR PPP: 1 (ref 0.8–1.2)
LIPASE SERPL-CCNC: 41 U/L (ref 4–60)
LYMPHOCYTES # BLD AUTO: 0.6 K/UL (ref 1–4.8)
LYMPHOCYTES NFR BLD: 5.7 % (ref 18–48)
MCH RBC QN AUTO: 29.5 PG (ref 27–31)
MCHC RBC AUTO-ENTMCNC: 32.7 G/DL (ref 32–36)
MCV RBC AUTO: 90 FL (ref 82–98)
MONOCYTES # BLD AUTO: 1.2 K/UL (ref 0.3–1)
MONOCYTES NFR BLD: 10.8 % (ref 4–15)
NEUTROPHILS # BLD AUTO: 9.3 K/UL (ref 1.8–7.7)
NEUTROPHILS NFR BLD: 83.5 % (ref 38–73)
PLATELET # BLD AUTO: 235 K/UL (ref 150–350)
PMV BLD AUTO: 9.4 FL (ref 9.2–12.9)
POTASSIUM SERPL-SCNC: 4.2 MMOL/L (ref 3.5–5.1)
PROT SERPL-MCNC: 5.9 G/DL (ref 6–8.4)
PROTHROMBIN TIME: 11.1 SEC (ref 9–12.5)
RBC # BLD AUTO: 4.24 M/UL (ref 4.6–6.2)
SODIUM SERPL-SCNC: 135 MMOL/L (ref 136–145)
TROPONIN I SERPL DL<=0.01 NG/ML-MCNC: 5.37 NG/ML (ref 0–0.03)
WBC # BLD AUTO: 11.14 K/UL (ref 3.9–12.7)

## 2019-07-24 PROCEDURE — 85730 THROMBOPLASTIN TIME PARTIAL: CPT | Mod: HCNC

## 2019-07-24 PROCEDURE — 80053 COMPREHEN METABOLIC PANEL: CPT | Mod: HCNC

## 2019-07-24 PROCEDURE — 93010 EKG 12-LEAD: ICD-10-PCS | Mod: HCNC,,, | Performed by: INTERNAL MEDICINE

## 2019-07-24 PROCEDURE — 96361 HYDRATE IV INFUSION ADD-ON: CPT | Mod: HCNC

## 2019-07-24 PROCEDURE — G0180 PR HOME HEALTH MD CERTIFICATION: ICD-10-PCS | Mod: ,,, | Performed by: INTERNAL MEDICINE

## 2019-07-24 PROCEDURE — 93005 ELECTROCARDIOGRAM TRACING: CPT | Mod: HCNC

## 2019-07-24 PROCEDURE — 25000003 PHARM REV CODE 250: Mod: HCNC | Performed by: FAMILY MEDICINE

## 2019-07-24 PROCEDURE — 84484 ASSAY OF TROPONIN QUANT: CPT | Mod: HCNC

## 2019-07-24 PROCEDURE — 85025 COMPLETE CBC W/AUTO DIFF WBC: CPT | Mod: HCNC

## 2019-07-24 PROCEDURE — 63600175 PHARM REV CODE 636 W HCPCS: Mod: HCNC | Performed by: EMERGENCY MEDICINE

## 2019-07-24 PROCEDURE — 82550 ASSAY OF CK (CPK): CPT | Mod: HCNC

## 2019-07-24 PROCEDURE — G0180 MD CERTIFICATION HHA PATIENT: HCPCS | Mod: ,,, | Performed by: INTERNAL MEDICINE

## 2019-07-24 PROCEDURE — 93010 ELECTROCARDIOGRAM REPORT: CPT | Mod: HCNC,,, | Performed by: INTERNAL MEDICINE

## 2019-07-24 PROCEDURE — 99285 EMERGENCY DEPT VISIT HI MDM: CPT | Mod: 25,HCNC

## 2019-07-24 PROCEDURE — 36415 COLL VENOUS BLD VENIPUNCTURE: CPT | Mod: HCNC

## 2019-07-24 PROCEDURE — 85610 PROTHROMBIN TIME: CPT | Mod: HCNC

## 2019-07-24 PROCEDURE — 25000003 PHARM REV CODE 250: Mod: HCNC | Performed by: EMERGENCY MEDICINE

## 2019-07-24 PROCEDURE — 96374 THER/PROPH/DIAG INJ IV PUSH: CPT | Mod: HCNC

## 2019-07-24 PROCEDURE — 83690 ASSAY OF LIPASE: CPT | Mod: HCNC

## 2019-07-24 RX ORDER — ONDANSETRON 2 MG/ML
4 INJECTION INTRAMUSCULAR; INTRAVENOUS
Status: COMPLETED | OUTPATIENT
Start: 2019-07-24 | End: 2019-07-24

## 2019-07-24 RX ORDER — OXYCODONE AND ACETAMINOPHEN 10; 325 MG/1; MG/1
2 TABLET ORAL
Status: DISCONTINUED | OUTPATIENT
Start: 2019-07-24 | End: 2019-07-24

## 2019-07-24 RX ORDER — NALOXONE HYDROCHLORIDE 4 MG/.1ML
1 SPRAY NASAL ONCE
Status: ON HOLD | COMMUNITY
End: 2022-01-01 | Stop reason: HOSPADM

## 2019-07-24 RX ORDER — OXYCODONE AND ACETAMINOPHEN 10; 325 MG/1; MG/1
1 TABLET ORAL
Status: COMPLETED | OUTPATIENT
Start: 2019-07-24 | End: 2019-07-24

## 2019-07-24 RX ORDER — OXYCODONE AND ACETAMINOPHEN 10; 325 MG/1; MG/1
1 TABLET ORAL
Status: DISCONTINUED | OUTPATIENT
Start: 2019-07-24 | End: 2019-07-24

## 2019-07-24 RX ORDER — ONDANSETRON 4 MG/1
4 TABLET, ORALLY DISINTEGRATING ORAL
Status: COMPLETED | OUTPATIENT
Start: 2019-07-24 | End: 2019-07-24

## 2019-07-24 RX ADMIN — SODIUM CHLORIDE 500 ML: 0.9 INJECTION, SOLUTION INTRAVENOUS at 12:07

## 2019-07-24 RX ADMIN — ONDANSETRON 4 MG: 4 TABLET, ORALLY DISINTEGRATING ORAL at 05:07

## 2019-07-24 RX ADMIN — ONDANSETRON 4 MG: 2 INJECTION INTRAMUSCULAR; INTRAVENOUS at 12:07

## 2019-07-24 RX ADMIN — OXYCODONE HYDROCHLORIDE AND ACETAMINOPHEN 1 TABLET: 10; 325 TABLET ORAL at 05:07

## 2019-07-24 RX ADMIN — SODIUM CHLORIDE 500 ML: 0.9 INJECTION, SOLUTION INTRAVENOUS at 01:07

## 2019-07-24 NOTE — ED NOTES
"Pt states "although I felt better after the fluids, I am feeling now like I did before and I have left testicle pain"  "

## 2019-07-24 NOTE — PLAN OF CARE
07/24/19 1515   Final Note   Assessment Type Final Discharge Note   Anticipated Discharge Disposition Home-Health   Right Care Referral Info   Post Acute Recommendation Home-care   Facility Name [facility]

## 2019-07-24 NOTE — PROVIDER PROGRESS NOTES - EMERGENCY DEPT.
Encounter Date: 7/24/2019    ED Physician Progress Notes       SCRIBE NOTE: I, Lisa Denny, am scribing for, and in the presence of,  Kavita Nunez MD.  Physician Statement: IKavita MD, personally performed the services described in this documentation as scribed by Lisa Denny in my presence, and it is both accurate and complete.          5:48 PM: Dr. Nunez evaluated pt. Pt is shaking/moaning and is actively vomiting in his bed. He reports he does not feel good at this time. Pt is c/o of L testicle pain which onset since arrival. At this time,  exam has been performed.    : Male chaperone is present. External inspection is normal. No erythema, rash, or lesions. No penile discharge. Normal bilateral testicular lie and position. Scrotum and testes appear normal with no discoloration. No scrotal, testicular, or epididymal tenderness. No masses or hernias around the scrotum, testicles, or inguinal canal.    8:20 PM: Reassessed pt at this time. Discussed with pt all pertinent ED information and results. Pt reports he is feeling better. Discussed pt dx and plan of tx. Gave pt all f/u and return to the ED instructions. All questions and concerns were addressed at this time. Pt expresses understanding of information and instructions, and is comfortable with plan to discharge. Pt is stable for discharge.    I discussed with patient and/or family/caretaker that evaluation in the ED does not suggest any emergent or life threatening medical conditions requiring immediate intervention beyond what was provided in the ED, and I believe patient is safe for discharge.  Regardless, an unremarkable evaluation in the ED does not preclude the development or presence of a serious of life threatening condition. As such, patient was instructed to return immediately for any worsening or change in current symptoms.

## 2019-07-25 ENCOUNTER — NURSE TRIAGE (OUTPATIENT)
Dept: ADMINISTRATIVE | Facility: CLINIC | Age: 64
End: 2019-07-25

## 2019-07-25 ENCOUNTER — TELEPHONE (OUTPATIENT)
Dept: CARDIOLOGY | Facility: CLINIC | Age: 64
End: 2019-07-25

## 2019-07-25 NOTE — TELEPHONE ENCOUNTER
"  Reason for Disposition   Known COPD or other severe lung disease (i.e., bronchiectasis, cystic fibrosis, lung surgery) and worsening symptoms (i.e., increased sputum purulence or amount, increased breathing difficulty)    Protocols used: COUGH-A-OH     Spoke with pt for TCC post discharge follow up call.  Recent discharge for STEMI / S/P [facility].  He stated he "felt terrible"  When I asked what was wrong he just said "I feel terrible".  Pt coughing during call and stated he is coughing up "a lot of stuff - clear". Denied fever, SOB or chest pain. Has known history of COPD.  I instructed him to be seen at his PCP or Urgent Care today and he said he would go when he felt like he was able to go.   "

## 2019-07-25 NOTE — TELEPHONE ENCOUNTER
Spoke with patient about his cough.  Instructed to contact Dr. Esteban, his PCP.  Patient verbalized understanding.

## 2019-07-25 NOTE — ED NOTES
Pt sts pain has improved and needs a few more minutes before he calls his ride. No distress noted. VSS. Safety precautions in place. Will monitor

## 2019-07-30 ENCOUNTER — TELEPHONE (OUTPATIENT)
Dept: TRANSPLANT | Facility: CLINIC | Age: 64
End: 2019-07-30

## 2019-08-05 ENCOUNTER — LAB VISIT (OUTPATIENT)
Dept: LAB | Facility: HOSPITAL | Age: 64
End: 2019-08-05
Attending: NURSE PRACTITIONER
Payer: MEDICARE

## 2019-08-05 ENCOUNTER — OFFICE VISIT (OUTPATIENT)
Dept: CARDIOLOGY | Facility: CLINIC | Age: 64
End: 2019-08-05
Payer: MEDICARE

## 2019-08-05 VITALS
DIASTOLIC BLOOD PRESSURE: 63 MMHG | HEART RATE: 67 BPM | BODY MASS INDEX: 29.96 KG/M2 | SYSTOLIC BLOOD PRESSURE: 111 MMHG | HEIGHT: 71 IN | WEIGHT: 214 LBS

## 2019-08-05 DIAGNOSIS — E78.2 MIXED HYPERLIPIDEMIA: ICD-10-CM

## 2019-08-05 DIAGNOSIS — I10 ESSENTIAL HYPERTENSION: ICD-10-CM

## 2019-08-05 DIAGNOSIS — I25.10 CAD IN NATIVE ARTERY: ICD-10-CM

## 2019-08-05 DIAGNOSIS — I73.9 PAD (PERIPHERAL ARTERY DISEASE): ICD-10-CM

## 2019-08-05 DIAGNOSIS — I25.10 CAD IN NATIVE ARTERY: Primary | ICD-10-CM

## 2019-08-05 LAB
ANION GAP SERPL CALC-SCNC: 14 MMOL/L (ref 8–16)
BUN SERPL-MCNC: 14 MG/DL (ref 8–23)
CALCIUM SERPL-MCNC: 9.5 MG/DL (ref 8.7–10.5)
CHLORIDE SERPL-SCNC: 105 MMOL/L (ref 95–110)
CO2 SERPL-SCNC: 15 MMOL/L (ref 23–29)
CREAT SERPL-MCNC: 1.7 MG/DL (ref 0.5–1.4)
EST. GFR  (AFRICAN AMERICAN): 48 ML/MIN/1.73 M^2
EST. GFR  (NON AFRICAN AMERICAN): 41 ML/MIN/1.73 M^2
GLUCOSE SERPL-MCNC: 170 MG/DL (ref 70–110)
POTASSIUM SERPL-SCNC: 5 MMOL/L (ref 3.5–5.1)
SODIUM SERPL-SCNC: 134 MMOL/L (ref 136–145)

## 2019-08-05 PROCEDURE — 1101F PR PT FALLS ASSESS DOC 0-1 FALLS W/OUT INJ PAST YR: ICD-10-PCS | Mod: HCNC,CPTII,S$GLB, | Performed by: NURSE PRACTITIONER

## 2019-08-05 PROCEDURE — 80048 BASIC METABOLIC PNL TOTAL CA: CPT | Mod: HCNC

## 2019-08-05 PROCEDURE — 99214 OFFICE O/P EST MOD 30 MIN: CPT | Mod: HCNC,S$GLB,, | Performed by: NURSE PRACTITIONER

## 2019-08-05 PROCEDURE — 36415 COLL VENOUS BLD VENIPUNCTURE: CPT | Mod: HCNC

## 2019-08-05 PROCEDURE — 99999 PR PBB SHADOW E&M-EST. PATIENT-LVL III: CPT | Mod: PBBFAC,HCNC,, | Performed by: NURSE PRACTITIONER

## 2019-08-05 PROCEDURE — 3074F SYST BP LT 130 MM HG: CPT | Mod: HCNC,CPTII,S$GLB, | Performed by: NURSE PRACTITIONER

## 2019-08-05 PROCEDURE — 1101F PT FALLS ASSESS-DOCD LE1/YR: CPT | Mod: HCNC,CPTII,S$GLB, | Performed by: NURSE PRACTITIONER

## 2019-08-05 PROCEDURE — 99999 PR PBB SHADOW E&M-EST. PATIENT-LVL III: ICD-10-PCS | Mod: PBBFAC,HCNC,, | Performed by: NURSE PRACTITIONER

## 2019-08-05 PROCEDURE — 99214 PR OFFICE/OUTPT VISIT, EST, LEVL IV, 30-39 MIN: ICD-10-PCS | Mod: HCNC,S$GLB,, | Performed by: NURSE PRACTITIONER

## 2019-08-05 PROCEDURE — 3074F PR MOST RECENT SYSTOLIC BLOOD PRESSURE < 130 MM HG: ICD-10-PCS | Mod: HCNC,CPTII,S$GLB, | Performed by: NURSE PRACTITIONER

## 2019-08-05 PROCEDURE — 3078F PR MOST RECENT DIASTOLIC BLOOD PRESSURE < 80 MM HG: ICD-10-PCS | Mod: HCNC,CPTII,S$GLB, | Performed by: NURSE PRACTITIONER

## 2019-08-05 PROCEDURE — 3078F DIAST BP <80 MM HG: CPT | Mod: HCNC,CPTII,S$GLB, | Performed by: NURSE PRACTITIONER

## 2019-08-05 RX ORDER — AMLODIPINE BESYLATE 10 MG/1
10 TABLET ORAL DAILY
Qty: 90 TABLET | Refills: 3 | Status: SHIPPED | OUTPATIENT
Start: 2019-08-05 | End: 2020-04-22 | Stop reason: SDUPTHER

## 2019-08-05 NOTE — PROGRESS NOTES
Subjective:   Patient ID:  Lavelle Ladd is a 64 y.o. male who presents for follow up of Coronary Artery Disease; Congestive Heart Failure; and Hypertension      HPI  Mr. Ladd's current medical conditions include CAD, Pt has h/o CRI, DM, HTN, HLP, PAD, right BKA, transmetatarsal amputation of left foot, renal transplant in May 2016, CAD and immunosuppressed post transplant.   History of  LHC in  w Dr. Hannah, 50% prox-mid LAD stenosis with med mgt advised    He presents to clinic today for hospital follow up. Presented to [facility] on 19 with acute anterolateral and inferior STEMI. Cath showed occluded apical LAD, diffuse non obstructive CAD elsewhere.  Patient hydrated with IVF the day after angiogram due to bump in creatine to 1.7. No ACEI ordered upon DC.     Denies any chest pain, CAMARA, orthopnea, PND, dizziness, palpitations,  near syncope, syncope or edema . Has chronic Stable SOB.  Has no symptoms concerning for angina or equivalent. No CNS Complaints to suggest TIA or CVA. Does well with limiting sodium intake.      Past Medical History:   Diagnosis Date    DINORAH (acute kidney injury) 2016    Arthritis     CAD in native artery 2019    CHF (congestive heart failure)     Chronic obstructive pulmonary disease 2016    Coronary artery disease involving native coronary artery of native heart without angina pectoris 2016    CRI (chronic renal insufficiency) 2019     donor kidney transplant for DM 16     Induction with Thymo x3 and IV solumedrol to total 875mg  Kidney Biopsy  2016: 16 glomeruli, ACR type 1 AVR type 2, significant microcirculatory changes, c4d negative, No DSA, 5 to10% fibrosis. Treated with thymo x8 2016- no rejection      Diastolic heart failure     Encounter for blood transfusion     ESRD on RRT since 10/2013 10/29/2013    Biopsy proven diabetic nephropathy and lymphoplasmacytic interstitial infiltrate not c/w with AIN (ddx sjogrens  or assoc with tamm-horsefall protein extravasation)     GERD (gastroesophageal reflux disease)     History of hepatitis C, s/p successful Rx w/ SVR12 - 4/2017 4/5/2017    Completed 12 weeks harvoni w/ SVR    Hyperlipidemia     Hypertension     PAD (peripheral artery disease) 7/21/2019    PIC line (peripherally inserted central catheter) flush     Prophylactic immunotherapy     Proteinuria     PVD (peripheral vascular disease) 6/26/2017    RLE BKA CT 12/11/16 Extensive atherosclerotic disease of the aorta and mesenteric arteries.     Renal hypertension     Type 2 diabetes mellitus with diabetic neuropathy, with long-term current use of insulin 12/1/2016    Vitamin B12 deficiency        Past Surgical History:   Procedure Laterality Date    AMPUTATION, FOOT, TRANSMETATARSAL Left 11/5/2018    Performed by Karla Wheeler DPM at [facility] OR    AMPUTATION, FOOT, TRANSMETATARSAL Left 10/31/2018    Performed by [facility] Wheeler DPM at [facility] OR    AMPUTATION, FOOT, TRANSMETATARSAL Left 9/21/2018    Performed by [facility] Wheeler DPM at [facility] OR    av bovine graft      Left UE    AV FISTULA PLACEMENT      left UE    BIOPSY Tranplanted Kidney N/A 8/15/2016    Performed by [facility] at [facility] OR 2ND FLR    BIOPSY, LIVER, TRANSJUGULAR APPROACH N/A 4/24/2014    Performed by [facility] at [facility] CATH LAB    CARDIAC CATHETERIZATION  02/2015    CATHETERIZATION, HEART, LEFT Left 7/21/2019    Performed by Andrew Valdez MD at [facility] CATH LAB    CLOSURE, WOUND Left 11/5/2018    Performed by [facility]la Wheeler DPM at [facility] OR    CLOSURE, WOUND Left 9/24/2018    Performed by [facility] Wheeler DPM at [facility] OR    DEBRIDEMENT, METATARSAL BONE, 2 OR MORE BONES Left 11/5/2018    Performed by [facility] Wheeler DPM at [facility] OR    INSERTION, CATHETER, VASCULAR N/A 10/31/2013    Performed by Ralph Mckeon MD at [facility] CATH LAB    IRRIGATION AND DEBRIDEMENT Left 7/9/2018    Performed by Katerina  LASHONDA Magaña at [facility] OR    IRRIGATION AND DEBRIDEMENT, LOWER EXTREMITY Left 10/31/2018    Performed by Karla Wheeler DPM at [facility] OR    KIDNEY TRANSPLANT  2016    LEFT LEG ANGIOGRAM N/A 2018    Performed by Donal Mcdonald MD at [facility] CATH LAB    LEG AMPUTATION THROUGH KNEE      right LE, started as nail puncture leading to diabetic ulcer    LENGTHENING, TENDON, ACHILLES Left 2018    Performed by Karla Wheeler DPM at [facility] OR    TRANSPLANT-KIDNEY Right 2016    [facility]       Social History     Tobacco Use    Smoking status: Former Smoker     Packs/day: 1.00     Years: 40.00     Pack years: 40.00     Last attempt to quit: 2013     Years since quittin.5    Smokeless tobacco: Never Used   Substance Use Topics    Alcohol use: Yes     Alcohol/week: 3.6 oz     Types: 6 Cans of beer per week     Comment: seldom    Drug use: No       Family History   Problem Relation Age of Onset    Cancer Father     Diabetes Father     Heart failure Father     Stroke Father     Heart failure Mother     Kidney disease Neg Hx        Current Outpatient Medications   Medication Sig    amLODIPine (NORVASC) 10 MG tablet Take 1 tablet (10 mg total) by mouth once daily.    aspirin (ECOTRIN) 81 MG EC tablet Take 1 tablet (81 mg total) by mouth once daily.    atorvastatin (LIPITOR) 80 MG tablet Take 1 tablet (80 mg total) by mouth once daily.    BD INSULIN SYRINGE ULTRA-FINE 1 mL 31 gauge x 5/16 Syrg USE ONE AS DIRECTED FOUR TIMES DAILY WITH MEALS AND AT BEDTIME    blood sugar diagnostic Strp 1 each by Misc.(Non-Drug; Combo Route) route 3 (three) times daily.    blood-glucose meter kit Use as instructed    clopidogrel (PLAVIX) 75 mg tablet Take 1 tablet (75 mg total) by mouth once daily.    ergocalciferol (ERGOCALCIFEROL) 50,000 unit Cap Take 1 capsule (50,000 Units total) by mouth every 7 days. Take on     flash glucose scanning reader  "(FREESTYLE BRYAN 14 DAY READER) Misc 1 Device by Misc.(Non-Drug; Combo Route) route as needed.    flash glucose sensor (FREESTYLE BRYAN 14 DAY SENSOR) Kit 1 kit by Misc.(Non-Drug; Combo Route) route every 14 (fourteen) days.    HYDROcodone-acetaminophen (NORCO)  mg per tablet     hydrOXYzine (ATARAX) 50 MG tablet 50 mg 3 (three) times daily as needed.     isosorbide mononitrate (IMDUR) 30 MG 24 hr tablet Take 1 tablet (30 mg total) by mouth once daily.    liraglutide 0.6 mg/0.1 mL, 18 mg/3 mL, subq PNIJ (VICTOZA 2-KOKI) 0.6 mg/0.1 mL (18 mg/3 mL) PnIj Inject 1.8 mg into the skin once daily.    metoprolol tartrate (LOPRESSOR) 25 MG tablet Take 1 tablet (25 mg total) by mouth 2 (two) times daily.    mycophenolate (CELLCEPT) 500 mg Tab Take 1 tablet (500 mg total) by mouth 2 (two) times daily.    naloxone (NARCAN) 4 mg/actuation Spry 1 spray by Nasal route once.    ondansetron (ZOFRAN-ODT) 4 MG TbDL Take 1 tablet (4 mg total) by mouth every 8 (eight) hours as needed.    pen needle, diabetic (SURE-FINE PEN NEEDLES) 31 gauge x 3/16" Ndle 1 each by Misc.(Non-Drug; Combo Route) route 4 (four) times daily with meals and nightly.    predniSONE (DELTASONE) 5 MG tablet Take 1 tablet (5 mg total) by mouth once daily.    sodium bicarbonate 650 MG tablet Take 4 tablets (2,600 mg total) by mouth 2 (two) times daily.    tacrolimus (PROGRAF) 0.5 MG Cap Take 3 capsules (1.5 mg total) by mouth every 12 (twelve) hours. Z94.0 Kidney Transplant    TRESIBA FLEXTOUCH U-100 100 unit/mL (3 mL) InPn Inject 30 Units into the skin 2 (two) times daily.    budesonide-formoterol 160-4.5 mcg (SYMBICORT) 160-4.5 mcg/actuation HFAA Inhale 2 puffs into the lungs every 12 (twelve) hours. Wash out mouth after using    gabapentin (NEURONTIN) 300 MG capsule Take 1 capsule (300 mg total) by mouth 3 (three) times daily. for 10 days     No current facility-administered medications for this visit.      Current Outpatient Medications on " "File Prior to Visit   Medication Sig    aspirin (ECOTRIN) 81 MG EC tablet Take 1 tablet (81 mg total) by mouth once daily.    atorvastatin (LIPITOR) 80 MG tablet Take 1 tablet (80 mg total) by mouth once daily.    BD INSULIN SYRINGE ULTRA-FINE 1 mL 31 gauge x 5/16 Syrg USE ONE AS DIRECTED FOUR TIMES DAILY WITH MEALS AND AT BEDTIME    blood sugar diagnostic Strp 1 each by Misc.(Non-Drug; Combo Route) route 3 (three) times daily.    blood-glucose meter kit Use as instructed    clopidogrel (PLAVIX) 75 mg tablet Take 1 tablet (75 mg total) by mouth once daily.    ergocalciferol (ERGOCALCIFEROL) 50,000 unit Cap Take 1 capsule (50,000 Units total) by mouth every 7 days. Take on Mondays    flash glucose scanning reader (FREESTYLE BRYAN 14 DAY READER) Misc 1 Device by Misc.(Non-Drug; Combo Route) route as needed.    flash glucose sensor (FREESTYLE BRYAN 14 DAY SENSOR) Kit 1 kit by Misc.(Non-Drug; Combo Route) route every 14 (fourteen) days.    HYDROcodone-acetaminophen (NORCO)  mg per tablet     hydrOXYzine (ATARAX) 50 MG tablet 50 mg 3 (three) times daily as needed.     isosorbide mononitrate (IMDUR) 30 MG 24 hr tablet Take 1 tablet (30 mg total) by mouth once daily.    liraglutide 0.6 mg/0.1 mL, 18 mg/3 mL, subq PNIJ (VICTOZA 2-KOKI) 0.6 mg/0.1 mL (18 mg/3 mL) PnIj Inject 1.8 mg into the skin once daily.    metoprolol tartrate (LOPRESSOR) 25 MG tablet Take 1 tablet (25 mg total) by mouth 2 (two) times daily.    mycophenolate (CELLCEPT) 500 mg Tab Take 1 tablet (500 mg total) by mouth 2 (two) times daily.    naloxone (NARCAN) 4 mg/actuation Spry 1 spray by Nasal route once.    ondansetron (ZOFRAN-ODT) 4 MG TbDL Take 1 tablet (4 mg total) by mouth every 8 (eight) hours as needed.    pen needle, diabetic (SURE-FINE PEN NEEDLES) 31 gauge x 3/16" Ndle 1 each by Misc.(Non-Drug; Combo Route) route 4 (four) times daily with meals and nightly.    predniSONE (DELTASONE) 5 MG tablet Take 1 tablet (5 mg total) " by mouth once daily.    sodium bicarbonate 650 MG tablet Take 4 tablets (2,600 mg total) by mouth 2 (two) times daily.    tacrolimus (PROGRAF) 0.5 MG Cap Take 3 capsules (1.5 mg total) by mouth every 12 (twelve) hours. Z94.0 Kidney Transplant    TRESIBA FLEXTOUCH U-100 100 unit/mL (3 mL) InPn Inject 30 Units into the skin 2 (two) times daily.    [DISCONTINUED] amLODIPine (NORVASC) 10 MG tablet Take 1 tablet (10 mg total) by mouth once daily.    budesonide-formoterol 160-4.5 mcg (SYMBICORT) 160-4.5 mcg/actuation HFAA Inhale 2 puffs into the lungs every 12 (twelve) hours. Wash out mouth after using    gabapentin (NEURONTIN) 300 MG capsule Take 1 capsule (300 mg total) by mouth 3 (three) times daily. for 10 days     No current facility-administered medications on file prior to visit.        Review of Systems   Constitution: Negative for diaphoresis, malaise/fatigue, weight gain and weight loss.   HENT: Negative for congestion and nosebleeds.    Cardiovascular: Negative for chest pain, claudication, cyanosis, dyspnea on exertion, irregular heartbeat, leg swelling, near-syncope, orthopnea, palpitations, paroxysmal nocturnal dyspnea and syncope.   Respiratory: Negative for cough, hemoptysis, shortness of breath, sleep disturbances due to breathing, snoring, sputum production and wheezing.    Hematologic/Lymphatic: Negative for bleeding problem. Does not bruise/bleed easily.   Skin: Positive for suspicious lesions (left temple). Negative for rash.   Musculoskeletal: Negative for arthritis, back pain, falls, joint pain, muscle cramps and muscle weakness.   Gastrointestinal: Negative for abdominal pain, constipation, diarrhea, heartburn, hematemesis, hematochezia, melena, nausea and vomiting.   Genitourinary: Negative for dysuria, hematuria and nocturia.   Neurological: Negative for excessive daytime sleepiness, dizziness, headaches, light-headedness, loss of balance, numbness, vertigo and weakness.       Objective:  "  Physical Exam   Constitutional: He is oriented to person, place, and time. He appears well-developed and well-nourished.   Neck: Neck supple. No JVD present.   Cardiovascular: Normal rate, regular rhythm, normal heart sounds and normal pulses. Exam reveals no friction rub.   No murmur heard.  Pulmonary/Chest: Effort normal and breath sounds normal. No respiratory distress. He has no wheezes. He has no rales.   Abdominal: Soft. Bowel sounds are normal. He exhibits no distension.   Musculoskeletal: He exhibits no edema or tenderness.   right BKA   transmetatarsal amputation of left foot   Neurological: He is alert and oriented to person, place, and time.   Skin: Skin is warm and dry. No rash noted.   Left temple lesion. (see picture below).   Right femoral access site without redness, tenderness, swelling, or drainage. There is no active external bleeding or hematoma.    Psychiatric: He has a normal mood and affect. His behavior is normal.   Nursing note and vitals reviewed.            Vitals:    08/05/19 1308 08/05/19 1310   BP: 105/64 111/63   BP Location: Left arm Left arm   Pulse: 86 67   Weight: 97.1 kg (214 lb)    Height: 5' 11" (1.803 m)      Lab Results   Component Value Date    CHOL 123 07/21/2019    CHOL 207 (H) 02/05/2019    CHOL 149 09/19/2018     Lab Results   Component Value Date    HDL 45 07/21/2019    HDL 63 02/05/2019    HDL 29 (L) 09/19/2018     Lab Results   Component Value Date    LDLCALC 59.4 (L) 07/21/2019    LDLCALC 125.6 02/05/2019    LDLCALC 100.2 09/19/2018     Lab Results   Component Value Date    TRIG 93 07/21/2019    TRIG 92 02/05/2019    TRIG 99 09/19/2018     Lab Results   Component Value Date    CHOLHDL 36.6 07/21/2019    CHOLHDL 30.4 02/05/2019    CHOLHDL 19.5 (L) 09/19/2018       Chemistry        Component Value Date/Time     (L) 07/24/2019 1240    K 4.2 07/24/2019 1240     07/24/2019 1240    CO2 20 (L) 07/24/2019 1240    BUN 14 07/24/2019 1240    CREATININE 1.7 (H) " 07/24/2019 1240     (H) 07/24/2019 1240        Component Value Date/Time    CALCIUM 8.8 07/24/2019 1240    ALKPHOS 78 07/24/2019 1240    AST 14 07/24/2019 1240    ALT 12 07/24/2019 1240    BILITOT 0.9 07/24/2019 1240    ESTGFRAFRICA 48 (A) 07/24/2019 1240    EGFRNONAA 41 (A) 07/24/2019 1240          Lab Results   Component Value Date    TSH 0.909 07/21/2019     Lab Results   Component Value Date    INR 1.0 07/24/2019    INR 1.0 07/21/2019    INR 1.1 07/21/2019     Lab Results   Component Value Date    WBC 11.14 07/24/2019    HGB 12.5 (L) 07/24/2019    HCT 38.2 (L) 07/24/2019    MCV 90 07/24/2019     07/24/2019     BMP  Sodium   Date Value Ref Range Status   07/24/2019 135 (L) 136 - 145 mmol/L Final     Potassium   Date Value Ref Range Status   07/24/2019 4.2 3.5 - 5.1 mmol/L Final     Chloride   Date Value Ref Range Status   07/24/2019 104 95 - 110 mmol/L Final     CO2   Date Value Ref Range Status   07/24/2019 20 (L) 23 - 29 mmol/L Final     BUN, Bld   Date Value Ref Range Status   07/24/2019 14 8 - 23 mg/dL Final     Creatinine   Date Value Ref Range Status   07/24/2019 1.7 (H) 0.5 - 1.4 mg/dL Final     Calcium   Date Value Ref Range Status   07/24/2019 8.8 8.7 - 10.5 mg/dL Final     Anion Gap   Date Value Ref Range Status   07/24/2019 11 8 - 16 mmol/L Final     eGFR if    Date Value Ref Range Status   07/24/2019 48 (A) >60 mL/min/1.73 m^2 Final     eGFR if non    Date Value Ref Range Status   07/24/2019 41 (A) >60 mL/min/1.73 m^2 Final     Comment:     Calculation used to obtain the estimated glomerular filtration  rate (eGFR) is the CKD-EPI equation.        CrCl cannot be calculated (Patient's most recent lab result is older than the maximum 7 days allowed.).    Assessment:     1. CAD in native artery    2. Essential hypertension    3. Mixed hyperlipidemia    4. PAD (peripheral artery disease)        Plan:   CAD in native artery  Continue ASA, Plavix, Statin, BB and  Imdur     Essential hypertension  Continue amlodipine and metoprolol   Continue Imdur     Mixed hyperlipidemia  Continue Statin     PAD (peripheral artery disease)  Continue current medical management     RTC in 6 months or sooner if needed with Dr. Camden PIÑA today to reassess renal function post angiogram   Refer to Dermatology for left temple lesion -patient would like HH to arrange appt for him

## 2019-08-15 ENCOUNTER — OFFICE VISIT (OUTPATIENT)
Dept: DERMATOLOGY | Facility: CLINIC | Age: 64
End: 2019-08-15
Payer: MEDICARE

## 2019-08-15 DIAGNOSIS — L57.0 ACTINIC KERATOSES: ICD-10-CM

## 2019-08-15 DIAGNOSIS — D48.5 NEOPLASM OF UNCERTAIN BEHAVIOR OF SKIN: Primary | ICD-10-CM

## 2019-08-15 PROCEDURE — 99999 PR PBB SHADOW E&M-EST. PATIENT-LVL II: ICD-10-PCS | Mod: PBBFAC,HCNC,, | Performed by: STUDENT IN AN ORGANIZED HEALTH CARE EDUCATION/TRAINING PROGRAM

## 2019-08-15 PROCEDURE — 88305 TISSUE SPECIMEN TO PATHOLOGY, DERMATOLOGY: ICD-10-PCS | Mod: 26,HCNC,, | Performed by: PATHOLOGY

## 2019-08-15 PROCEDURE — 99999 PR PBB SHADOW E&M-EST. PATIENT-LVL II: CPT | Mod: PBBFAC,HCNC,, | Performed by: STUDENT IN AN ORGANIZED HEALTH CARE EDUCATION/TRAINING PROGRAM

## 2019-08-15 PROCEDURE — 99202 OFFICE O/P NEW SF 15 MIN: CPT | Mod: 25,HCNC,S$GLB, | Performed by: STUDENT IN AN ORGANIZED HEALTH CARE EDUCATION/TRAINING PROGRAM

## 2019-08-15 PROCEDURE — 11102 PR TANGENTIAL BIOPSY, SKIN, SINGLE LESION: ICD-10-PCS | Mod: HCNC,S$GLB,, | Performed by: STUDENT IN AN ORGANIZED HEALTH CARE EDUCATION/TRAINING PROGRAM

## 2019-08-15 PROCEDURE — 17003 DESTRUCTION, PREMALIGNANT LESIONS; SECOND THROUGH 14 LESIONS: ICD-10-PCS | Mod: 59,HCNC,S$GLB, | Performed by: STUDENT IN AN ORGANIZED HEALTH CARE EDUCATION/TRAINING PROGRAM

## 2019-08-15 PROCEDURE — 17003 DESTRUCT PREMALG LES 2-14: CPT | Mod: 59,HCNC,S$GLB, | Performed by: STUDENT IN AN ORGANIZED HEALTH CARE EDUCATION/TRAINING PROGRAM

## 2019-08-15 PROCEDURE — 1101F PT FALLS ASSESS-DOCD LE1/YR: CPT | Mod: HCNC,CPTII,S$GLB, | Performed by: STUDENT IN AN ORGANIZED HEALTH CARE EDUCATION/TRAINING PROGRAM

## 2019-08-15 PROCEDURE — 1101F PR PT FALLS ASSESS DOC 0-1 FALLS W/OUT INJ PAST YR: ICD-10-PCS | Mod: HCNC,CPTII,S$GLB, | Performed by: STUDENT IN AN ORGANIZED HEALTH CARE EDUCATION/TRAINING PROGRAM

## 2019-08-15 PROCEDURE — 17000 PR DESTRUCTION(LASER SURGERY,CRYOSURGERY,CHEMOSURGERY),PREMALIGNANT LESIONS,FIRST LESION: ICD-10-PCS | Mod: 59,HCNC,S$GLB, | Performed by: STUDENT IN AN ORGANIZED HEALTH CARE EDUCATION/TRAINING PROGRAM

## 2019-08-15 PROCEDURE — 17000 DESTRUCT PREMALG LESION: CPT | Mod: 59,HCNC,S$GLB, | Performed by: STUDENT IN AN ORGANIZED HEALTH CARE EDUCATION/TRAINING PROGRAM

## 2019-08-15 PROCEDURE — 11102 TANGNTL BX SKIN SINGLE LES: CPT | Mod: HCNC,S$GLB,, | Performed by: STUDENT IN AN ORGANIZED HEALTH CARE EDUCATION/TRAINING PROGRAM

## 2019-08-15 PROCEDURE — 99202 PR OFFICE/OUTPT VISIT, NEW, LEVL II, 15-29 MIN: ICD-10-PCS | Mod: 25,HCNC,S$GLB, | Performed by: STUDENT IN AN ORGANIZED HEALTH CARE EDUCATION/TRAINING PROGRAM

## 2019-08-15 PROCEDURE — 88305 TISSUE EXAM BY PATHOLOGIST: CPT | Mod: 26,HCNC,, | Performed by: PATHOLOGY

## 2019-08-15 PROCEDURE — 88305 TISSUE EXAM BY PATHOLOGIST: CPT | Mod: HCNC | Performed by: PATHOLOGY

## 2019-08-15 RX ORDER — MUPIROCIN CALCIUM 20 MG/G
CREAM TOPICAL 3 TIMES DAILY
Qty: 30 G | Refills: 1 | Status: SHIPPED | OUTPATIENT
Start: 2019-08-15 | End: 2022-01-01

## 2019-08-15 NOTE — PROGRESS NOTES
Subjective:       Patient ID:  Lavelle Ladd is a 64 y.o. male who presents for   Chief Complaint   Patient presents with    Spot     c/o spot to head x 3-4 months tx triple antibiotic ointment       History of Present Illness: The patient presents with chief complaint of a skin lesion  Location:  Left temple  Duration:  3-4 months   Signs/Symptoms:  Bleeding, wont heal. Has several other painful, scaly lesions on the face as well.   Prior treatments:  Triple antibiotic ointment   Pt denies any history of skin cancer         Review of Systems   Skin: Negative for itching, rash and dry skin.        Objective:    Physical Exam   Constitutional: He appears well-developed and well-nourished. No distress.   Neurological: He is alert and oriented to person, place, and time. He is not disoriented.   Psychiatric: He has a normal mood and affect.   Skin:   Areas Examined (abnormalities noted in diagram):   Head / Face Inspection Performed  Neck Inspection Performed  RUE Inspected  LUE Inspection Performed              Diagram Legend     Erythematous scaling macule/papule c/w actinic keratosis       Vascular papule c/w angioma      Pigmented verrucoid papule/plaque c/w seborrheic keratosis      Yellow umbilicated papule c/w sebaceous hyperplasia      Irregularly shaped tan macule c/w lentigo     1-2 mm smooth white papules consistent with Milia      Movable subcutaneous cyst with punctum c/w epidermal inclusion cyst      Subcutaneous movable cyst c/w pilar cyst      Firm pink to brown papule c/w dermatofibroma      Pedunculated fleshy papule(s) c/w skin tag(s)      Evenly pigmented macule c/w junctional nevus     Mildly variegated pigmented, slightly irregular-bordered macule c/w mildly atypical nevus      Flesh colored to evenly pigmented papule c/w intradermal nevus       Pink pearly papule/plaque c/w basal cell carcinoma      Erythematous hyperkeratotic cursted plaque c/w SCC      Surgical scar with no sign of skin  cancer recurrence      Open and closed comedones      Inflammatory papules and pustules      Verrucoid papule consistent consistent with wart     Erythematous eczematous patches and plaques     Dystrophic onycholytic nail with subungual debris c/w onychomycosis     Umbilicated papule    Erythematous-base heme-crusted tan verrucoid plaque consistent with inflamed seborrheic keratosis     Erythematous Silvery Scaling Plaque c/w Psoriasis     See annotation          Assessment / Plan:      Pathology Orders:     Normal Orders This Visit    Tissue Specimen To Pathology, Dermatology     Questions:    Directional Terms:  Other(comment)    Clinical Information:  SCC vs other    Specific Site:  left temple        Neoplasm of uncertain behavior of skin  -     mupirocin calcium 2% (BACTROBAN) 2 % cream; Apply topically 3 (three) times daily.  Dispense: 30 g; Refill: 1  -     Tissue Specimen To Pathology, Dermatology    Shave biopsy procedure note:    Shave biopsy performed after verbal consent including risk of infection, scar, recurrence, need for additional treatment of site. Area prepped with alcohol, anesthetized with approximately 1.0cc of 1% lidocaine with epinephrine. Lesional tissue shaved with razor blade. Hemostasis achieved with application of aluminum chloride followed by hyfrecation. No complications. Dressing applied. Wound care explained.    If biopsy positive for malignancy, refer for Mohs surgery consultation.    Actinic keratoses  Cryosurgery Procedure Note    Verbal consent from the patient is obtained including, but not limited to, risk of hypopigmentation/hyperpigmentation, scar, recurrence of lesion. The patient is aware of the precancerous quality and need for treatment of these lesions. Liquid nitrogen cryosurgery is applied to the 8 actinic keratoses, as detailed in the physical exam, to produce a freeze injury. The patient is aware that blisters may form and is instructed on wound care with gentle  cleansing and use of vaseline ointment to keep moist until healed. The patient is supplied a handout on cryosurgery and is instructed to call if lesions do not completely resolve.         Follow up in about 3 months (around 11/15/2019).

## 2019-08-19 RX ORDER — TACROLIMUS 0.5 MG/1
CAPSULE ORAL
Qty: 180 CAPSULE | Refills: 0 | Status: SHIPPED | OUTPATIENT
Start: 2019-08-19 | End: 2019-09-23 | Stop reason: SDUPTHER

## 2019-08-20 NOTE — ASSESSMENT & PLAN NOTE
- Baseline Cr 1.6 - 1.8  - Nephrology consulted as patient is renal transplant recipient taking Prograf and prednisone     normal...

## 2019-08-23 ENCOUNTER — TELEPHONE (OUTPATIENT)
Dept: DERMATOLOGY | Facility: CLINIC | Age: 64
End: 2019-08-23

## 2019-08-23 NOTE — TELEPHONE ENCOUNTER
----- Message from Grecia Alvarado sent at 8/23/2019  4:29 PM CDT -----  Contact: self [phone]  Type:  Patient Returning Call    Who Called:Lavelle Ladd  Who Left Message for Patient:unk  Does the patient know what this is regarding?: biopsy results  Would the patient rather a call back or a response via [facility]? Call back   Best Call Back Number:[phone]  Additional Information:

## 2019-08-27 DIAGNOSIS — Z94.0 KIDNEY REPLACED BY TRANSPLANT: ICD-10-CM

## 2019-08-27 RX ORDER — PREDNISONE 5 MG/1
TABLET ORAL
Qty: 30 TABLET | Refills: 4 | Status: SHIPPED | OUTPATIENT
Start: 2019-08-27 | End: 2019-09-23 | Stop reason: SDUPTHER

## 2019-08-28 ENCOUNTER — EXTERNAL HOME HEALTH (OUTPATIENT)
Dept: HOME HEALTH SERVICES | Facility: HOSPITAL | Age: 64
End: 2019-08-28
Payer: MEDICARE

## 2019-09-03 ENCOUNTER — OFFICE VISIT (OUTPATIENT)
Dept: OTOLARYNGOLOGY | Facility: CLINIC | Age: 64
End: 2019-09-03
Payer: MEDICARE

## 2019-09-03 DIAGNOSIS — I21.19 ACUTE ST ELEVATION MYOCARDIAL INFARCTION (STEMI) OF INFERIOR WALL: ICD-10-CM

## 2019-09-03 DIAGNOSIS — N18.3 ACUTE RENAL FAILURE SUPERIMPOSED ON STAGE 3 CHRONIC KIDNEY DISEASE, UNSPECIFIED ACUTE RENAL FAILURE TYPE: ICD-10-CM

## 2019-09-03 DIAGNOSIS — N17.9 ACUTE RENAL FAILURE SUPERIMPOSED ON STAGE 3 CHRONIC KIDNEY DISEASE, UNSPECIFIED ACUTE RENAL FAILURE TYPE: ICD-10-CM

## 2019-09-03 DIAGNOSIS — C44.320 SCC (SQUAMOUS CELL CARCINOMA), FACE: Primary | ICD-10-CM

## 2019-09-03 PROCEDURE — 99499 UNLISTED E&M SERVICE: CPT | Mod: HCNC,S$GLB,, | Performed by: ORTHOPAEDIC SURGERY

## 2019-09-03 PROCEDURE — 99999 PR PBB SHADOW E&M-EST. PATIENT-LVL IV: ICD-10-PCS | Mod: PBBFAC,HCNC,, | Performed by: ORTHOPAEDIC SURGERY

## 2019-09-03 PROCEDURE — 99204 OFFICE O/P NEW MOD 45 MIN: CPT | Mod: HCNC,S$GLB,, | Performed by: ORTHOPAEDIC SURGERY

## 2019-09-03 PROCEDURE — 99204 PR OFFICE/OUTPT VISIT, NEW, LEVL IV, 45-59 MIN: ICD-10-PCS | Mod: HCNC,S$GLB,, | Performed by: ORTHOPAEDIC SURGERY

## 2019-09-03 PROCEDURE — 99999 PR PBB SHADOW E&M-EST. PATIENT-LVL IV: CPT | Mod: PBBFAC,HCNC,, | Performed by: ORTHOPAEDIC SURGERY

## 2019-09-03 PROCEDURE — 1101F PR PT FALLS ASSESS DOC 0-1 FALLS W/OUT INJ PAST YR: ICD-10-PCS | Mod: HCNC,CPTII,S$GLB, | Performed by: ORTHOPAEDIC SURGERY

## 2019-09-03 PROCEDURE — 99499 RISK ADDL DX/OHS AUDIT: ICD-10-PCS | Mod: HCNC,S$GLB,, | Performed by: ORTHOPAEDIC SURGERY

## 2019-09-03 PROCEDURE — 1101F PT FALLS ASSESS-DOCD LE1/YR: CPT | Mod: HCNC,CPTII,S$GLB, | Performed by: ORTHOPAEDIC SURGERY

## 2019-09-03 RX ORDER — NAPROXEN 500 MG/1
1 TABLET ORAL
COMMUNITY
Start: 2019-07-06 | End: 2019-09-09 | Stop reason: CLARIF

## 2019-09-03 RX ORDER — DICLOFENAC EPOLAMINE 0.01 G/1
1 SYSTEM TOPICAL DAILY
COMMUNITY
Start: 2019-07-22 | End: 2022-01-01 | Stop reason: ALTCHOICE

## 2019-09-03 NOTE — LETTER
September 3, 2019      Diego Hill MD  [address] [place] [address]           [facility]  [address] [facility] [address]  Phone: [phone]  Fax: [phone]          Patient: Lavelle Ladd   MR Number: [id number]   YOB: 1955   Date of Visit: 9/3/2019       Dear Dr. Diego Hill:    Thank you for referring Lavelle Ladd to me for evaluation. Attached you will find relevant portions of my assessment and plan of care.    If you have questions, please do not hesitate to call me. I look forward to following Lavelle Ladd along with you.    Sincerely,    Grecia Jimenez MD    Enclosure  CC:  No Recipients    If you would like to receive this communication electronically, please contact [email or web address] or [phone] to request more information on [facility] [employer] access.    For providers and/or their staff who would like to refer a patient to [facility], please contact us through our one-stop-shop provider referral line, [facility], at [phone].    If you feel you have received this communication in error or would no longer like to receive these types of communications, please e-mail [email or web address]

## 2019-09-03 NOTE — PROGRESS NOTES
Subjective:       Patient ID: Lavelle Ladd is a 64 y.o. male.    Chief Complaint: No chief complaint on file.    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  He says that he has been aware of the area for at least several months.  He has a significant history of sun exposure, did work construction.  He has a previous history of smoking, quit about 10 years ago.  He has no previous history of skin cancers.  He is on ASA and plavix, he has a history of MI several months ago.    Review of Systems   Constitutional: Negative for fatigue, fever and unexpected weight change.   HENT: Negative for congestion, ear discharge, ear pain, facial swelling, hearing loss, nosebleeds, postnasal drip, rhinorrhea, sinus pressure, sneezing, sore throat, tinnitus, trouble swallowing and voice change.    Eyes: Negative for discharge, redness and itching.   Respiratory: Negative for cough, choking, shortness of breath and wheezing.    Cardiovascular: Negative for chest pain and palpitations.   Gastrointestinal: Negative for abdominal pain.        No reflux.   Musculoskeletal: Positive for gait problem (has a right BKA). Negative for neck pain.   Skin: Positive for wound (left temple).   Neurological: Positive for light-headedness. Negative for dizziness, facial asymmetry and headaches.   Hematological: Negative for adenopathy. Does not bruise/bleed easily.   Psychiatric/Behavioral: Negative for agitation, behavioral problems, confusion and decreased concentration.       Objective:      Physical Exam   Constitutional: He is oriented to person, place, and time. Vital signs are normal. He appears well-developed and well-nourished. No distress.   HENT:   Head: Normocephalic and atraumatic.   Right Ear: Hearing, tympanic membrane, external ear and ear canal normal.   Left Ear: Hearing, tympanic membrane, external ear and ear canal normal.   Nose: Nose  normal. No mucosal edema, rhinorrhea, nasal deformity or septal deviation.   Mouth/Throat: Uvula is midline, oropharynx is clear and moist and mucous membranes are normal. No trismus in the jaw. Normal dentition. No uvula swelling. No oropharyngeal exudate or posterior oropharyngeal edema.   Left cerumen impaction, left temple with 5 cm exophytic lesion, biopsy proven SCCA   Eyes: Pupils are equal, round, and reactive to light. Conjunctivae and EOM are normal. Right eye exhibits no chemosis. Left eye exhibits no chemosis. Right conjunctiva is not injected. Left conjunctiva is not injected. No scleral icterus.   Neck: Trachea normal and phonation normal. No tracheal tenderness present. No tracheal deviation present. No thyroid mass and no thyromegaly present.   Cardiovascular: Intact distal pulses.   Pulmonary/Chest: Effort normal. No accessory muscle usage or stridor. No respiratory distress.   Lymphadenopathy:        Head (right side): No submental, no submandibular, no preauricular and no posterior auricular adenopathy present.        Head (left side): No submental, no submandibular, no preauricular and no posterior auricular adenopathy present.     He has no cervical adenopathy.        Right cervical: No superficial cervical and no deep cervical adenopathy present.       Left cervical: No superficial cervical and no deep cervical adenopathy present.   No lymphadenopathy   Neurological: He is alert and oriented to person, place, and time. No cranial nerve deficit.   Skin: Skin is warm and dry. No rash noted. No erythema.   Psychiatric: He has a normal mood and affect. His behavior is normal. Thought content normal.                 Assessment:       1. SCC (squamous cell carcinoma), face    2. Acute ST elevation myocardial infarction (STEMI) of inferior wall    3. Acute renal failure superimposed on stage 3 chronic kidney disease, unspecified acute renal failure type        Plan:       1.  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  However, he does have an extensive medical history including a recent myocardial infarction.  I would recommend complete evaluation including a CT scan of the neck with IV contrast as well as to have him meet both with Radiation Oncology as well as Head neck surgery. We discussed that in general squamous cell carcinomas of the skin her treated surgically, but the challenge for him would certainly be reconstruction.  We discussed that due to his overall medical status he may require excision with skin graft depending on Dr. Alarcon recommendations.  We also discussed that he may be a candidate for radiation therapy as a primary modality if he cannot undergo surgery from a cardiopulmonary standpoint.  At the patient's request, he was scheduled for radiation oncology, head and neck, and CT on the same day to help with coordination.

## 2019-09-05 PROBLEM — C44.320 SQUAMOUS CELL CARCINOMA OF SKIN OF FACE: Status: ACTIVE | Noted: 2019-09-05

## 2019-09-05 NOTE — PROGRESS NOTES
[facility] [place] [facility]  RADIATION ONCOLOGY CONSULTATION    Name: Lavelle Ladd  : 1955      Patient Referred To Radiation Oncology By:  Dr. Grecia Jimenez MD  [id number] [place] [address] [place] [address]    DIAGNOSIS:  Left temporal squamous cell carcinoma, 5 cm, recent STEMI    HISTORY OF PRESENT ILLNESS:  Lavelle Ladd is a pleasant 64 y.o. male who who has at least several months of an enlarging left temporal lesion measuring 5 cm in 2019.  He has an extensive sun exposure history.  8/15/19 shave biopsy returned squamous cell carcinoma.  He was seen by Dr. Jimenez of ENT.  She took photographs of the lesion which I reviewed.  2019 he had an ST-elevation MI.    REVIEW OF SYSTEMS: (Positive findings bold, otherwise negative)   Constitutional: fever, fatigue, weight change  Eyes: blurred vision in the past 3 months, double vision   ENT: ear pain, new mouth lesions, jaw pain, difficulty swallowing, sore throat  Cardiovascular: chest pain on exertion, reflux, leg swelling  Respiratory: shortness of breath, dyspnea, cough, hemoptysis.   GI: abdominal pain, diarrhea, constipation, blood in stool, painful bowel movements  : painful or burning urination, blood in urine  Musculoskeletal: new bone or joint pains  Neurologic: headache, seizure, focal numbness or tingling, balance changes, speech changes  Lymph: new or enlarged lymph nodes  Psychiatric: depression, anxiety    PRIOR RADIATION HISTORY: None    PAST MEDICAL HISTORY:  Past Medical History:   Diagnosis Date    DINORAH (acute kidney injury) 2016    Arthritis     CAD in native artery 2019    CHF (congestive heart failure)     Chronic obstructive pulmonary disease 2016    Coronary artery disease involving native coronary artery of native heart without angina pectoris 2016    CRI (chronic renal insufficiency) 2019     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 Treated with thymo x8 6/21/2016- no rejection      Diastolic heart failure     Encounter for blood transfusion     ESRD on RRT since 10/2013 10/29/2013    Biopsy proven diabetic nephropathy and lymphoplasmacytic interstitial infiltrate not c/w with AIN (ddx sjogrens or assoc with tamm-horsefall protein extravasation)     GERD (gastroesophageal reflux disease)     History of hepatitis C, s/p successful Rx w/ SVR12 - 4/2017 4/5/2017    Completed 12 weeks harvoni w/ SVR    Hyperlipidemia     Hypertension     PAD (peripheral artery disease) 7/21/2019    PIC line (peripherally inserted central catheter) flush     Prophylactic immunotherapy     Proteinuria     PVD (peripheral vascular disease) 6/26/2017    RLE BKA CT 12/11/16 Extensive atherosclerotic disease of the aorta and mesenteric arteries.     Renal hypertension     Type 2 diabetes mellitus with diabetic neuropathy, with long-term current use of insulin 12/1/2016    Vitamin B12 deficiency        PAST SURGICAL HISTORY:  Past Surgical History:   Procedure Laterality Date    AMPUTATION, FOOT, TRANSMETATARSAL Left 11/5/2018    Performed by [facility] Wheeler [facility] at [facility] OR    AMPUTATION, FOOT, TRANSMETATARSAL Left 10/31/2018    Performed by [facility] Wheeler [facility] at [facility] OR    AMPUTATION, FOOT, TRANSMETATARSAL Left 9/21/2018    Performed by [facility] Wheeler [facility] at [facility] OR    av bovine graft      Left UE    AV FISTULA PLACEMENT      left UE    BIOPSY Tranplanted Kidney N/A 8/15/2016    Performed by [facility] OR 2ND FLR    BIOPSY, LIVER, TRANSJUGULAR APPROACH N/A 4/24/2014    Performed by [facility] Provider at [facility] CATH LAB    CARDIAC CATHETERIZATION  02/2015    CATHETERIZATION, HEART, LEFT Left 7/21/2019    Performed by Andrew Valdez MD at [facility] CATH  LAB    CLOSURE, WOUND Left 11/5/2018    [facility] by [facility] at [facility] OR    CLOSURE, WOUND Left 9/24/2018    Performed by Karla Wheeler D[facility] at [facility] OR    DEBRIDEMENT, METATARSAL BONE, 2 OR MORE BONES Left 11/5/2018    Performed by Karla Wheeler D[facility] at [facility] OR    INSERTION, CATHETER, VASCULAR N/A 10/31/2013    Performed by Ralph Mckeon MD at [facility] CATH LAB    IRRIGATION AND DEBRIDEMENT Left 7/9/2018    Performed by Katerina Magaña DPM at [facility] OR    IRRIGATION AND DEBRIDEMENT, LOWER EXTREMITY Left 10/31/2018    Performed by Karla Wheeler DPM at [facility] OR    KIDNEY TRANSPLANT  05/21/2016    LEFT LEG ANGIOGRAM N/A 6/14/2018    Performed by Donal Mcdonald MD at [facility] CATH LAB    LEG AMPUTATION THROUGH KNEE  2011    right LE, started as nail puncture leading to diabetic ulcer    LENGTHENING, TENDON, ACHILLES Left 9/24/2018    Performed by Karla Wheeler D[facility] at [facility] OR    TRANSPLANT-KIDNEY Right 5/21/2016    [facility]       ALLERGIES:   Review of patient's allergies indicates:  No Known Allergies    MEDICATIONS:    Current Outpatient Medications:     amLODIPine (NORVASC) 10 MG tablet, Take 1 tablet (10 mg total) by mouth once daily., Disp: 90 tablet, Rfl: 3    aspirin (ECOTRIN) 81 MG EC tablet, Take 1 tablet (81 mg total) by mouth once daily., Disp: , Rfl: 0    atorvastatin (LIPITOR) 80 MG tablet, Take 1 tablet (80 mg total) by mouth once daily., Disp: 30 tablet, Rfl: 11    BD INSULIN SYRINGE ULTRA-FINE 1 mL 31 gauge x 5/16 Syrg, USE ONE AS DIRECTED FOUR TIMES DAILY WITH MEALS AND AT BEDTIME, Disp: 120 each, Rfl: 10    blood sugar diagnostic Strp, 1 each by Misc.(Non-Drug; Combo Route) route 3 (three) times daily., Disp: 100 each, Rfl: 11    blood-glucose meter kit, Use as instructed, Disp: 1 each, Rfl: 0    clopidogrel (PLAVIX) 75 mg tablet, Take 1 tablet (75 mg total) by mouth once daily., Disp: 30 tablet, Rfl: 11     "diclofenac (FLECTOR) 1.3 % PT12, Apply 1 packet topically once daily., Disp: , Rfl:     ergocalciferol (ERGOCALCIFEROL) 50,000 unit Cap, Take 1 capsule (50,000 Units total) by mouth every 7 days. Take on Mondays, Disp: 4 capsule, Rfl: 11    flash glucose scanning reader (FREESTYLE BRYAN 14 DAY READER) Misc, 1 Device by Misc.(Non-Drug; Combo Route) route as needed., Disp: 1 each, Rfl: 0    flash glucose sensor (FREESTYLE BRYAN 14 DAY SENSOR) Kit, 1 kit by Misc.(Non-Drug; Combo Route) route every 14 (fourteen) days., Disp: 2 kit, Rfl: 11    gabapentin (NEURONTIN) 300 MG capsule, Take 1 capsule (300 mg total) by mouth 3 (three) times daily. for 10 days, Disp: 30 capsule, Rfl: 0    HYDROcodone-acetaminophen (NORCO)  mg per tablet, , Disp: , Rfl:     hydrOXYzine (ATARAX) 50 MG tablet, 50 mg 3 (three) times daily as needed. , Disp: , Rfl:     isosorbide mononitrate (IMDUR) 30 MG 24 hr tablet, Take 1 tablet (30 mg total) by mouth once daily., Disp: 30 tablet, Rfl: 11    liraglutide 0.6 mg/0.1 mL, 18 mg/3 mL, subq PNIJ (VICTOZA 2-KOKI) 0.6 mg/0.1 mL (18 mg/3 mL) PnIj, Inject 1.8 mg into the skin once daily., Disp: 9 mL, Rfl: 11    metoprolol tartrate (LOPRESSOR) 25 MG tablet, Take 1 tablet (25 mg total) by mouth 2 (two) times daily., Disp: 60 tablet, Rfl: 11    mupirocin calcium 2% (BACTROBAN) 2 % cream, Apply topically 3 (three) times daily., Disp: 30 g, Rfl: 1    mycophenolate (CELLCEPT) 500 mg Tab, Take 1 tablet (500 mg total) by mouth 2 (two) times daily., Disp: 120 tablet, Rfl: 0    naloxone (NARCAN) 4 mg/actuation Spry, 1 spray by Nasal route once., Disp: , Rfl:     naproxen (NAPROSYN) 500 MG tablet, Take 1 tablet by mouth as needed., Disp: , Rfl:     ondansetron (ZOFRAN-ODT) 4 MG TbDL, Take 1 tablet (4 mg total) by mouth every 8 (eight) hours as needed., Disp: 21 tablet, Rfl: 1    pen needle, diabetic (SURE-FINE PEN NEEDLES) 31 gauge x 3/16" Ndle, 1 each by Misc.(Non-Drug; Combo Route) route 4 " (four) times daily with meals and nightly., Disp: 120 each, Rfl: 11    predniSONE (DELTASONE) 5 MG tablet, TAKE ONE TABLET BY MOUTH ONE TIME DAILY, Disp: 30 tablet, Rfl: 4    sodium bicarbonate 650 MG tablet, Take 4 tablets (2,600 mg total) by mouth 2 (two) times daily., Disp: 240 tablet, Rfl: 11    tacrolimus (PROGRAF) 0.5 MG Cap, TAKE THREE CAPSULES BY MOUTH EVERY 12 HOURS, Disp: 180 capsule, Rfl: 0    TRESIBA FLEXTOUCH U-100 100 unit/mL (3 mL) InPn, Inject 30 Units into the skin 2 (two) times daily., Disp: , Rfl:     budesonide-formoterol 160-4.5 mcg (SYMBICORT) 160-4.5 mcg/actuation HFAA, Inhale 2 puffs into the lungs every 12 (twelve) hours. Wash out mouth after using, Disp: 1 Inhaler, Rfl: 12  No current facility-administered medications for this visit.     SOCIAL HISTORY:  Social History     Socioeconomic History    Marital status: Single     Spouse name: Not on file    Number of children: Not on file    Years of education: Not on file    Highest education level: Not on file   Occupational History    Occupation: Disabled     Employer: DISABLED   Social Needs    Financial resource strain: Not on file    Food insecurity:     Worry: Not on file     Inability: Not on file    Transportation needs:     Medical: Not on file     Non-medical: Not on file   Tobacco Use    Smoking status: Former Smoker     Packs/day: 1.00     Years: 40.00     Pack years: 40.00     Last attempt to quit: 2013     Years since quittin.6    Smokeless tobacco: Never Used   Substance and Sexual Activity    Alcohol use: Yes     Alcohol/week: 3.6 oz     Types: 6 Cans of beer per week     Comment: seldom    Drug use: No    Sexual activity: Never   Lifestyle    Physical activity:     Days per week: Not on file     Minutes per session: Not on file    Stress: Not on file   Relationships    Social connections:     Talks on phone: Not on file     Gets together: Not on file     Attends Latter-day service: Not on file      "Active member of club or organization: Not on file     Attends meetings of clubs or organizations: Not on file     Relationship status: Not on file   Other Topics Concern    Not on file   Social History Narrative    Lives alone. Retired from construction and various jobs, now retired. Would like to return to work PT to alleviate boredom.       FAMILY HISTORY:  Family History   Problem Relation Age of Onset    Cancer Father     Diabetes Father     Heart failure Father     Stroke Father     Heart failure Mother     Kidney disease Neg Hx        PHYSICAL EXAMINATION:  Constitutional: well appearing, no acute distress, ECOG 2 - Ambulates, capable of self care only  Vitals:    BP (!) 162/94   Pulse 82   Temp 98.2 °F (36.8 °C)   Resp 18   Ht 5' 11" (1.803 m)   Wt 97.1 kg (214 lb)   SpO2 98%   BMI 29.85 kg/m²   Eyes: sclera anicteric, EOMI, pupils equal, round and reactive to light  Neck: trachea midline  Lymphatic: no cervical, supraclavicular, facial, parotid adenopathy  Cardiovascular: regular rate, no murmurs, no edema of the upper or lower extremities, radial pulse 2+  Respiratory: unlabored effort, clear to auscultation, no wheezes, bilateral decreased breath sounds  Skin:  5 cm exophytic round lesion left temporal skin with central ulceration    IMAGING AND LABORATORY FINDINGS: As per HPI; images reviewed personally.    ASSESSMENT:  Large left temporal skin squamous cell carcinoma    PLAN:  We discussed the role of radiation in the definitive an adjuvant setting for skin cancer of the head and neck.  I will review his CT which will be later today.  He will see Dr. Alarcon later today.  If he is a surgical candidate I think he should have surgery and possibly adjuvant radiation depending on the final pathology.  If he is not a surgical candidate due to recent MI and comorbidities or he refuses surgery I think definitive radiation is a reasonable option and local control should be good.  The risk of " toxicity in this region is low since it is not immediately close to the eye and also shield can be placed in the eye to shield the ocular structures other than the lid.  He will experience moist desquamation and alopecia in the treatment field, and some radiation to the underlying brain although this will be minimal because electron therapy will be used which is very superficially penetrating.  I will discuss the case with Dr. Alarcon after he sees the patient.     We discussed the techniques, toxicities and indications of radiation and I answered the patient's questions to their apparent satisfaction.    STEPHANIE Rodríguez M.D.  Radiation Oncology  [facility] [address] [place] [address]  Ph: [phone]  [email or web address]

## 2019-09-06 ENCOUNTER — INITIAL CONSULT (OUTPATIENT)
Dept: RADIATION ONCOLOGY | Facility: CLINIC | Age: 64
End: 2019-09-06
Payer: MEDICARE

## 2019-09-06 ENCOUNTER — HOSPITAL ENCOUNTER (OUTPATIENT)
Dept: RADIOLOGY | Facility: HOSPITAL | Age: 64
Discharge: HOME | End: 2019-09-06
Attending: ORTHOPAEDIC SURGERY
Payer: MEDICARE

## 2019-09-06 ENCOUNTER — OFFICE VISIT (OUTPATIENT)
Dept: OTOLARYNGOLOGY | Facility: CLINIC | Age: 64
End: 2019-09-06
Payer: MEDICARE

## 2019-09-06 VITALS
HEART RATE: 82 BPM | RESPIRATION RATE: 18 BRPM | HEIGHT: 71 IN | TEMPERATURE: 98 F | WEIGHT: 214 LBS | BODY MASS INDEX: 29.96 KG/M2 | SYSTOLIC BLOOD PRESSURE: 162 MMHG | OXYGEN SATURATION: 98 % | DIASTOLIC BLOOD PRESSURE: 94 MMHG

## 2019-09-06 DIAGNOSIS — D84.9 IMMUNOSUPPRESSION: ICD-10-CM

## 2019-09-06 DIAGNOSIS — I21.19 ACUTE ST ELEVATION MYOCARDIAL INFARCTION (STEMI) OF INFERIOR WALL: ICD-10-CM

## 2019-09-06 DIAGNOSIS — C44.320 SCC (SQUAMOUS CELL CARCINOMA), FACE: ICD-10-CM

## 2019-09-06 DIAGNOSIS — C44.320 SQUAMOUS CELL CARCINOMA OF SKIN OF FACE: ICD-10-CM

## 2019-09-06 DIAGNOSIS — C44.40 MALIGNANT NEOPLASM OF SKIN OF SCALP: Primary | ICD-10-CM

## 2019-09-06 DIAGNOSIS — Z94.0 S/P KIDNEY TRANSPLANT: Chronic | ICD-10-CM

## 2019-09-06 DIAGNOSIS — C44.92 SCC (SQUAMOUS CELL CARCINOMA): Primary | ICD-10-CM

## 2019-09-06 PROCEDURE — 3288F PR FALLS RISK ASSESSMENT DOCUMENTED: ICD-10-PCS | Mod: HCNC,CPTII,S$GLB, | Performed by: OTOLARYNGOLOGY

## 2019-09-06 PROCEDURE — 3288F PR FALLS RISK ASSESSMENT DOCUMENTED: ICD-10-PCS | Mod: HCNC,CPTII,S$GLB, | Performed by: RADIOLOGY

## 2019-09-06 PROCEDURE — 25500020 PHARM REV CODE 255: Mod: HCNC | Performed by: ORTHOPAEDIC SURGERY

## 2019-09-06 PROCEDURE — 3077F PR MOST RECENT SYSTOLIC BLOOD PRESSURE >= 140 MM HG: ICD-10-PCS | Mod: HCNC,CPTII,S$GLB, | Performed by: OTOLARYNGOLOGY

## 2019-09-06 PROCEDURE — 99499 UNLISTED E&M SERVICE: CPT | Mod: HCNC,S$GLB,, | Performed by: OTOLARYNGOLOGY

## 2019-09-06 PROCEDURE — 3288F FALL RISK ASSESSMENT DOCD: CPT | Mod: HCNC,CPTII,S$GLB, | Performed by: OTOLARYNGOLOGY

## 2019-09-06 PROCEDURE — 99205 OFFICE O/P NEW HI 60 MIN: CPT | Mod: HCNC,S$GLB,, | Performed by: RADIOLOGY

## 2019-09-06 PROCEDURE — 99999 PR PBB SHADOW E&M-EST. PATIENT-LVL III: CPT | Mod: PBBFAC,HCNC,, | Performed by: RADIOLOGY

## 2019-09-06 PROCEDURE — 3077F SYST BP >= 140 MM HG: CPT | Mod: HCNC,CPTII,S$GLB, | Performed by: RADIOLOGY

## 2019-09-06 PROCEDURE — 99499 RISK ADDL DX/OHS AUDIT: ICD-10-PCS | Mod: HCNC,S$GLB,, | Performed by: RADIOLOGY

## 2019-09-06 PROCEDURE — 99999 PR PBB SHADOW E&M-EST. PATIENT-LVL III: CPT | Mod: PBBFAC,HCNC,, | Performed by: OTOLARYNGOLOGY

## 2019-09-06 PROCEDURE — 99499 UNLISTED E&M SERVICE: CPT | Mod: HCNC,S$GLB,, | Performed by: RADIOLOGY

## 2019-09-06 PROCEDURE — 3077F PR MOST RECENT SYSTOLIC BLOOD PRESSURE >= 140 MM HG: ICD-10-PCS | Mod: HCNC,CPTII,S$GLB, | Performed by: RADIOLOGY

## 2019-09-06 PROCEDURE — 99999 PR PBB SHADOW E&M-EST. PATIENT-LVL III: ICD-10-PCS | Mod: PBBFAC,HCNC,, | Performed by: RADIOLOGY

## 2019-09-06 PROCEDURE — 3080F DIAST BP >= 90 MM HG: CPT | Mod: HCNC,CPTII,S$GLB, | Performed by: RADIOLOGY

## 2019-09-06 PROCEDURE — 1100F PTFALLS ASSESS-DOCD GE2>/YR: CPT | Mod: HCNC,CPTII,S$GLB, | Performed by: OTOLARYNGOLOGY

## 2019-09-06 PROCEDURE — 99205 PR OFFICE/OUTPT VISIT, NEW, LEVL V, 60-74 MIN: ICD-10-PCS | Mod: HCNC,S$GLB,, | Performed by: RADIOLOGY

## 2019-09-06 PROCEDURE — 1100F PTFALLS ASSESS-DOCD GE2>/YR: CPT | Mod: HCNC,CPTII,S$GLB, | Performed by: RADIOLOGY

## 2019-09-06 PROCEDURE — 70491 CT SOFT TISSUE NECK W/DYE: CPT | Mod: TC,HCNC

## 2019-09-06 PROCEDURE — 3080F DIAST BP >= 90 MM HG: CPT | Mod: HCNC,CPTII,S$GLB, | Performed by: OTOLARYNGOLOGY

## 2019-09-06 PROCEDURE — 1100F PR PT FALLS ASSESS DOC 2+ FALLS/FALL W/INJURY/YR: ICD-10-PCS | Mod: HCNC,CPTII,S$GLB, | Performed by: RADIOLOGY

## 2019-09-06 PROCEDURE — 3077F SYST BP >= 140 MM HG: CPT | Mod: HCNC,CPTII,S$GLB, | Performed by: OTOLARYNGOLOGY

## 2019-09-06 PROCEDURE — 99214 OFFICE O/P EST MOD 30 MIN: CPT | Mod: HCNC,S$GLB,, | Performed by: OTOLARYNGOLOGY

## 2019-09-06 PROCEDURE — 99999 PR PBB SHADOW E&M-EST. PATIENT-LVL III: ICD-10-PCS | Mod: PBBFAC,HCNC,, | Performed by: OTOLARYNGOLOGY

## 2019-09-06 PROCEDURE — 99214 PR OFFICE/OUTPT VISIT, EST, LEVL IV, 30-39 MIN: ICD-10-PCS | Mod: HCNC,S$GLB,, | Performed by: OTOLARYNGOLOGY

## 2019-09-06 PROCEDURE — 99499 RISK ADDL DX/OHS AUDIT: ICD-10-PCS | Mod: HCNC,S$GLB,, | Performed by: OTOLARYNGOLOGY

## 2019-09-06 PROCEDURE — 3080F PR MOST RECENT DIASTOLIC BLOOD PRESSURE >= 90 MM HG: ICD-10-PCS | Mod: HCNC,CPTII,S$GLB, | Performed by: RADIOLOGY

## 2019-09-06 PROCEDURE — 3288F FALL RISK ASSESSMENT DOCD: CPT | Mod: HCNC,CPTII,S$GLB, | Performed by: RADIOLOGY

## 2019-09-06 PROCEDURE — 1100F PR PT FALLS ASSESS DOC 2+ FALLS/FALL W/INJURY/YR: ICD-10-PCS | Mod: HCNC,CPTII,S$GLB, | Performed by: OTOLARYNGOLOGY

## 2019-09-06 PROCEDURE — 3080F PR MOST RECENT DIASTOLIC BLOOD PRESSURE >= 90 MM HG: ICD-10-PCS | Mod: HCNC,CPTII,S$GLB, | Performed by: OTOLARYNGOLOGY

## 2019-09-06 RX ADMIN — IOHEXOL 75 ML: 350 INJECTION, SOLUTION INTRAVENOUS at 11:09

## 2019-09-06 NOTE — LETTER
September 6, 2019      Grecia Jimenez MD  [address] [place] [address] Dr Francheska [address]ER [address]            [facility] - Radiation Oncology  [address] [facility]  [place] [address]  Phone: [phone]  Fax: [phone]          Patient: Lavelle Ladd   MR Number: [id number]   YOB: 1955   Date of Visit: 9/6/2019       Dear Dr. Grecia Jimenez:    Thank you for referring Lavelle Ladd to me for evaluation. Attached you will find relevant portions of my assessment and plan of care.    If you have questions, please do not hesitate to call me. I look forward to following Lavelle Ladd along with you.    Sincerely,    Chapin Rodríguez II, MD    Enclosure  CC:  No Recipients    If you would like to receive this communication electronically, please contact [email or web address] or [phone] to request more information on [employer] Link access.    For providers and/or their staff who would like to refer a patient to [facility], please contact us through our one-stop-shop provider referral line, [facility], at [phone].    If you feel you have received this communication in error or would no longer like to receive these types of communications, please e-mail [email or web address]

## 2019-09-06 NOTE — LETTER
September 6, 2019      Grecia Jimenez MD  [id number] [place] [address] Dr Francheska [place] [address]           [facility]  [address] [facility] [address]  [place] [address]  Phone: [phone]  Fax: [phone]          Patient: Lavelle Ladd   MR Number: [id number]   YOB: 1955   Date of Visit: 9/6/2019       Dear Dr. Grecia Jimenez:    Thank you for referring Lavelle Ladd to me for evaluation. Attached you will find relevant portions of my assessment and plan of care.    If you have questions, please do not hesitate to call me. I look forward to following Lavelle Ladd along with you.    Sincerely,    Lenard Alarcon MD    Enclosure  CC:  No Recipients    If you would like to receive this communication electronically, please contact [email or web address] or [phone] to request more information on [employer] Link access.    For providers and/or their staff who would like to refer a patient to [facility], please contact us through our one-stop-shop provider referral line, [facility] , at [phone].    If you feel you have received this communication in error or would no longer like to receive these types of communications, please e-mail [email or web address]

## 2019-09-06 NOTE — PROGRESS NOTES
Head and Neck Surgery Consult    Seen in consultation from Dr Jimenez    HPI: Lavelle Ladd is a 64 y.o. male presenting with 3-4 months of an enlarging, friable, malodorous mass of L temple. He is immunosuppressed S/P a kidney transplant. He had a recent MI 1 month ago and is on ASA. He has not had prior skin cancers. He has had multiple prior surgeries without anesthesia incident.     Past Medical History:   Diagnosis Date    DINORAH (acute kidney injury) 2016    Arthritis     CAD in native artery 2019    CHF (congestive heart failure)     Chronic obstructive pulmonary disease 2016    Coronary artery disease involving native coronary artery of native heart without angina pectoris 2016    CRI (chronic renal insufficiency) 2019     donor kidney transplant for DM 16     Induction with Thymo x3 and IV solumedrol to total 875mg  Kidney Biopsy  2016: 16 glomeruli, ACR type 1 AVR type 2, significant microcirculatory changes, c4d negative, No DSA, 5 to10% fibrosis. Treated with thymo x8 2016- no rejection      Diastolic heart failure     Encounter for blood transfusion     ESRD on RRT since 10/2013 10/29/2013    Biopsy proven diabetic nephropathy and lymphoplasmacytic interstitial infiltrate not c/w with AIN (ddx sjogrens or assoc with tamm-horsefall protein extravasation)     GERD (gastroesophageal reflux disease)     History of hepatitis C, s/p successful Rx w/ SVR12 - 2017    Completed 12 weeks harvoni w/ SVR    Hyperlipidemia     Hypertension     PAD (peripheral artery disease) 2019    PIC line (peripherally inserted central catheter) flush     Prophylactic immunotherapy     Proteinuria     PVD (peripheral vascular disease) 2017    RLE BKA CT 16 Extensive atherosclerotic disease of the aorta and mesenteric arteries.     Renal hypertension     Type 2 diabetes mellitus with diabetic neuropathy, with long-term current use of  insulin 12/1/2016    Vitamin B12 deficiency        Past Surgical History:   Procedure Laterality Date    AMPUTATION, FOOT, TRANSMETATARSAL Left 11/5/2018    Performed by Karla Wheeler DPM at [facility] OR    AMPUTATION, FOOT, TRANSMETATARSAL Left 10/31/2018    Performed by Karla Wheeler DPM at [facility] OR    AMPUTATION, FOOT, TRANSMETATARSAL Left 9/21/2018    Performed by Karla Wheeler DPM at [facility] OR    av bovine graft      Left UE    AV FISTULA PLACEMENT      left UE    BIOPSY Tranplanted Kidney N/A 8/15/2016    Performed by Paulette Hanna [facility] at [facility] OR 2ND FLR    BIOPSY, LIVER, TRANSJUGULAR APPROACH N/A 4/24/2014    Performed by [facility] Diagnostic Provider at [facility] CATH LAB    CARDIAC CATHETERIZATION  02/2015    CATHETERIZATION, HEART, LEFT Left 7/21/2019    Performed by Andrew Valdez MD at [facility] CATH LAB    CLOSURE, WOUND Left 11/5/2018    Performed by Karla Wheeler DPM at [facility] OR    CLOSURE, WOUND Left 9/24/2018    Performed by Karla Wheeler DPM at [facility] OR    DEBRIDEMENT, METATARSAL BONE, 2 OR MORE BONES Left 11/5/2018    Performed by Karla Wheeler DPM at [facility] OR    INSERTION, CATHETER, VASCULAR N/A 10/31/2013    Performed by Ralph Mckeon MD at [facility] CATH LAB    IRRIGATION AND DEBRIDEMENT Left 7/9/2018    Performed by Katerina Magaña DPM at [facility] OR    IRRIGATION AND DEBRIDEMENT, LOWER EXTREMITY Left 10/31/2018    Performed by Karla Wheeler DPM at [facility] OR    KIDNEY TRANSPLANT  05/21/2016    LEFT LEG ANGIOGRAM N/A 6/14/2018    Performed by Donal Mcdonald MD at [facility] CATH LAB    LEG AMPUTATION THROUGH KNEE  2011    right LE, started as nail puncture leading to diabetic ulcer    LENGTHENING, TENDON, ACHILLES Left 9/24/2018    Performed by Karla Wheeler DPM at [facility] OR    TRANSPLANT-KIDNEY Right 5/21/2016    Performed by Ronny Bergeron MD at [facility] OR 2ND FLR         Current Outpatient Medications:     amLODIPine (NORVASC) 10 MG tablet, Take 1 tablet (10 mg  total) by mouth once daily., Disp: 90 tablet, Rfl: 3    aspirin (ECOTRIN) 81 MG EC tablet, Take 1 tablet (81 mg total) by mouth once daily., Disp: , Rfl: 0    atorvastatin (LIPITOR) 80 MG tablet, Take 1 tablet (80 mg total) by mouth once daily., Disp: 30 tablet, Rfl: 11    BD INSULIN SYRINGE ULTRA-FINE 1 mL 31 gauge x 5/16 Syrg, USE ONE AS DIRECTED FOUR TIMES DAILY WITH MEALS AND AT BEDTIME, Disp: 120 each, Rfl: 10    blood sugar diagnostic Strp, 1 each by Misc.(Non-Drug; Combo Route) route 3 (three) times daily., Disp: 100 each, Rfl: 11    blood-glucose meter kit, Use as instructed, Disp: 1 each, Rfl: 0    budesonide-formoterol 160-4.5 mcg (SYMBICORT) 160-4.5 mcg/actuation HFAA, Inhale 2 puffs into the lungs every 12 (twelve) hours. Wash out mouth after using, Disp: 1 Inhaler, Rfl: 12    clopidogrel (PLAVIX) 75 mg tablet, Take 1 tablet (75 mg total) by mouth once daily., Disp: 30 tablet, Rfl: 11    diclofenac (FLECTOR) 1.3 % PT12, Apply 1 packet topically once daily., Disp: , Rfl:     ergocalciferol (ERGOCALCIFEROL) 50,000 unit Cap, Take 1 capsule (50,000 Units total) by mouth every 7 days. Take on Mondays, Disp: 4 capsule, Rfl: 11    flash glucose scanning reader (FREESTYLE BRYAN 14 DAY READER) Misc, 1 Device by Misc.(Non-Drug; Combo Route) route as needed., Disp: 1 each, Rfl: 0    flash glucose sensor (FREESTYLE BRYAN 14 DAY SENSOR) Kit, 1 kit by Misc.(Non-Drug; Combo Route) route every 14 (fourteen) days., Disp: 2 kit, Rfl: 11    gabapentin (NEURONTIN) 300 MG capsule, Take 1 capsule (300 mg total) by mouth 3 (three) times daily. for 10 days, Disp: 30 capsule, Rfl: 0    HYDROcodone-acetaminophen (NORCO)  mg per tablet, , Disp: , Rfl:     hydrOXYzine (ATARAX) 50 MG tablet, 50 mg 3 (three) times daily as needed. , Disp: , Rfl:     isosorbide mononitrate (IMDUR) 30 MG 24 hr tablet, Take 1 tablet (30 mg total) by mouth once daily., Disp: 30 tablet, Rfl: 11    liraglutide 0.6 mg/0.1 mL, 18 mg/3  "mL, subq PNIJ (VICTOZA 2-KOKI) 0.6 mg/0.1 mL (18 mg/3 mL) PnIj, Inject 1.8 mg into the skin once daily., Disp: 9 mL, Rfl: 11    metoprolol tartrate (LOPRESSOR) 25 MG tablet, Take 1 tablet (25 mg total) by mouth 2 (two) times daily., Disp: 60 tablet, Rfl: 11    mupirocin calcium 2% (BACTROBAN) 2 % cream, Apply topically 3 (three) times daily., Disp: 30 g, Rfl: 1    mycophenolate (CELLCEPT) 500 mg Tab, Take 1 tablet (500 mg total) by mouth 2 (two) times daily., Disp: 120 tablet, Rfl: 0    naloxone (NARCAN) 4 mg/actuation Spry, 1 spray by Nasal route once., Disp: , Rfl:     naproxen (NAPROSYN) 500 MG tablet, Take 1 tablet by mouth as needed., Disp: , Rfl:     ondansetron (ZOFRAN-ODT) 4 MG TbDL, Take 1 tablet (4 mg total) by mouth every 8 (eight) hours as needed., Disp: 21 tablet, Rfl: 1    pen needle, diabetic (SURE-FINE PEN NEEDLES) 31 gauge x 3/16" Ndle, 1 each by Misc.(Non-Drug; Combo Route) route 4 (four) times daily with meals and nightly., Disp: 120 each, Rfl: 11    predniSONE (DELTASONE) 5 MG tablet, TAKE ONE TABLET BY MOUTH ONE TIME DAILY, Disp: 30 tablet, Rfl: 4    sodium bicarbonate 650 MG tablet, Take 4 tablets (2,600 mg total) by mouth 2 (two) times daily., Disp: 240 tablet, Rfl: 11    tacrolimus (PROGRAF) 0.5 MG Cap, TAKE THREE CAPSULES BY MOUTH EVERY 12 HOURS, Disp: 180 capsule, Rfl: 0    TRESIBA FLEXTOUCH U-100 100 unit/mL (3 mL) InPn, Inject 30 Units into the skin 2 (two) times daily., Disp: , Rfl:   No current facility-administered medications for this visit.     Review of patient's allergies indicates:  No Known Allergies    Family History   Problem Relation Age of Onset    Cancer Father     Diabetes Father     Heart failure Father     Stroke Father     Heart failure Mother     Kidney disease Neg Hx        Social History     Socioeconomic History    Marital status: Single     Spouse name: Not on file    Number of children: Not on file    Years of education: Not on file    Highest " education level: Not on file   Occupational History    Occupation: Disabled     Employer: DISABLED   Social Needs    Financial resource strain: Not on file    Food insecurity:     Worry: Not on file     Inability: Not on file    Transportation needs:     Medical: Not on file     Non-medical: Not on file   Tobacco Use    Smoking status: Former Smoker     Packs/day: 1.00     Years: 40.00     Pack years: 40.00     Last attempt to quit: 2013     Years since quittin.6    Smokeless tobacco: Never Used   Substance and Sexual Activity    Alcohol use: Yes     Alcohol/week: 3.6 oz     Types: 6 Cans of beer per week     Comment: seldom    Drug use: No    Sexual activity: Never   Lifestyle    Physical activity:     Days per week: Not on file     Minutes per session: Not on file    Stress: Not on file   Relationships    Social connections:     Talks on phone: Not on file     Gets together: Not on file     Attends Druze service: Not on file     Active member of club or organization: Not on file     Attends meetings of clubs or organizations: Not on file     Relationship status: Not on file   Other Topics Concern    Not on file   Social History Narrative    Lives alone. Retired from construction and various jobs, now retired. Would like to return to work PT to alleviate boredom.       Review of Systems -  Constitutional: Denies having night sweats, constant fatigue, loss of appetite or recent substantial weight loss.  Eyes: Denies blurred vision or double vision.  Respiratory: Denies symptoms of shortness of breath, noisy breathing, hoarseness or chronic cough.  GI: Denies symptoms of heartburn, acid regurgitation, or the known presence of a hiatal hernia.  The remainder of a 10-point review of systems is negative    REVIEW OF RADIOLOGICAL FILMS AND RECORDS (PERSONALLY REVIEWED):  No bone or cervical LN involvement evident    REVIEW OF LABS (PERSONALLY REVIEWED)  Noncontributory    PHYSICAL EXAM:  Vitals  - There were no vitals taken for this visit.  Constitutional -      General Appearance: well developed, well nourished, without obvious deformities apart from 6cm mobile fungating mass of L temple     Communication: speaks with a normal voice without hoarseness  Head & Face -     Overall: no obvious scars, lesions or masses     Parotid and submandibular glands: no masses or tenderness     Facial strength: normal and equal bilaterally  Eyes -      EOM intact  Ear, Nose, Mouth & Throat -     Ears: both left and right external auditory canals and TM's are normal, no external deformities     Nasal exam: mucosa is pink, septum is midline, visible turbinates are normal on anterior rhinoscopy     Mastication: teeth appear present     Oral Cavity and oropharynx: mucosa, hard and soft palates, tongue, posterior pharyngeal wall, lips and gums are without lesions. Tonsils appear unseen  Respiratory:     Breathing unlabored, no stridor  Cardiovascular:     No JVD, capillary refill normal  Larynx: using the mirror for indirect laryngoscopy, the epiglottic, false cords, true cords, and pyriform sinuses are without lesions and the true vocal cords move normally  Neck: appears symmetric, and on palpation is without masses   Endocrine:     Thyroid: no asymmetry, thyromegaly, or thyroid nodules on palpation  Lymphatic:     No cervical lymphadenopathy  Cranial Nerves:      II: Pupillary reflexes normal     III, IV, VI: EOM normal     V: 1,2,3: normal sensation     VII: Normal strength in all divisions     IX, X: Normal voice, palatal elevation and sensation     XI: Shoulder strength normal       XII: Tongue mobility normal  Psychiatric:     Appropriate affect    ASSESSMENT: 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 He will likely need to continue his anticoagulants owing to recent MI, and unfortunately due to his immunosuppressed status I do not feel we can  delay his cancer resection by much. Will plan on treatment in 2 stages, a short excision and application of ACell with bolster dressing, followed by a short skin graft to be placed 1 week later once negative margins confirmed. He agrees and wishes to proceed.       Lenard Alarcon

## 2019-09-06 NOTE — H&P (VIEW-ONLY)
Head and Neck Surgery Consult    Seen in consultation from Dr Jimenez    HPI: Lavelle Ladd is a 64 y.o. male presenting with 3-4 months of an enlarging, friable, malodorous mass of L temple. He is immunosuppressed S/P a kidney transplant. He had a recent MI 1 month ago and is on ASA. He has not had prior skin cancers. He has had multiple prior surgeries without anesthesia incident.     Past Medical History:   Diagnosis Date    DINORAH (acute kidney injury) 2016    Arthritis     CAD in native artery 2019    CHF (congestive heart failure)     Chronic obstructive pulmonary disease 2016    Coronary artery disease involving native coronary artery of native heart without angina pectoris 2016    CRI (chronic renal insufficiency) 2019     donor kidney transplant for DM 16     Induction with Thymo x3 and IV solumedrol to total 875mg  Kidney Biopsy  2016: 16 glomeruli, ACR type 1 AVR type 2, significant microcirculatory changes, c4d negative, No DSA, 5 to10% fibrosis. Treated with thymo x8 2016- no rejection      Diastolic heart failure     Encounter for blood transfusion     ESRD on RRT since 10/2013 10/29/2013    Biopsy proven diabetic nephropathy and lymphoplasmacytic interstitial infiltrate not c/w with AIN (ddx sjogrens or assoc with tamm-horsefall protein extravasation)     GERD (gastroesophageal reflux disease)     History of hepatitis C, s/p successful Rx w/ SVR12 - 2017    Completed 12 weeks harvoni w/ SVR    Hyperlipidemia     Hypertension     PAD (peripheral artery disease) 2019    PIC line (peripherally inserted central catheter) flush     Prophylactic immunotherapy     Proteinuria     PVD (peripheral vascular disease) 2017    RLE BKA CT 16 Extensive atherosclerotic disease of the aorta and mesenteric arteries.     Renal hypertension     Type 2 diabetes mellitus with diabetic neuropathy, with long-term current use of  insulin 12/1/2016    Vitamin B12 deficiency        Past Surgical History:   Procedure Laterality Date    AMPUTATION, FOOT, TRANSMETATARSAL Left 11/5/2018    Performed by Karla Wheeler DPM at [facility] OR    AMPUTATION, FOOT, TRANSMETATARSAL Left 10/31/2018    Performed by Karla Wheeler DPM at [facility] OR    AMPUTATION, FOOT, TRANSMETATARSAL Left 9/21/2018    Performed by Karla Wheeler DPM at [facility] OR    av bovine graft      Left UE    AV FISTULA PLACEMENT      left UE    BIOPSY Tranplanted Kidney N/A 8/15/2016    Performed by Paulette Hanna MD at [facility] OR 2ND FLR    BIOPSY, LIVER, TRANSJUGULAR APPROACH N/A 4/24/2014    Performed by [facility] Diagnostic Provider at [facility] CATH LAB    CARDIAC CATHETERIZATION  02/2015    CATHETERIZATION, HEART, LEFT Left 7/21/2019    Performed by Andrew Valdez MD at [facility] CATH LAB    CLOSURE, WOUND Left 11/5/2018    Performed by Karla Wheeler DPM at [facility] OR    CLOSURE, WOUND Left 9/24/2018    Performed by Karla Wheeler DPM at [facility] OR    DEBRIDEMENT, METATARSAL BONE, 2 OR MORE BONES Left 11/5/2018    Performed by Kalra Wheeler DPM at [facility] OR    INSERTION, CATHETER, VASCULAR N/A 10/31/2013    Performed by Ralph Mckeon MD at [facility] CATH LAB    IRRIGATION AND DEBRIDEMENT Left 7/9/2018    Performed by Katerina Magaña DPM at [facility] OR    IRRIGATION AND DEBRIDEMENT, LOWER EXTREMITY Left 10/31/2018    Performed by Karla Wheeler DPM at [facility] OR    KIDNEY TRANSPLANT  05/21/2016    LEFT LEG ANGIOGRAM N/A 6/14/2018    Performed by Donal Mcdonald MD at [facility] CATH LAB    LEG AMPUTATION THROUGH KNEE  2011    right LE, started as nail puncture leading to diabetic ulcer    LENGTHENING, TENDON, ACHILLES Left 9/24/2018    Performed by Karla Wheeler DPM at [facility] OR    TRANSPLANT-KIDNEY Right 5/21/2016    Performed by Ronny Bergeron MD at [facility] OR 2ND FLR         Current Outpatient Medications:     amLODIPine (NORVASC) 10 MG tablet, Take 1 tablet (10 mg  total) by mouth once daily., Disp: 90 tablet, Rfl: 3    aspirin (ECOTRIN) 81 MG EC tablet, Take 1 tablet (81 mg total) by mouth once daily., Disp: , Rfl: 0    atorvastatin (LIPITOR) 80 MG tablet, Take 1 tablet (80 mg total) by mouth once daily., Disp: 30 tablet, Rfl: 11    BD INSULIN SYRINGE ULTRA-FINE 1 mL 31 gauge x 5/16 Syrg, USE ONE AS DIRECTED FOUR TIMES DAILY WITH MEALS AND AT BEDTIME, Disp: 120 each, Rfl: 10    blood sugar diagnostic Strp, 1 each by Misc.(Non-Drug; Combo Route) route 3 (three) times daily., Disp: 100 each, Rfl: 11    blood-glucose meter kit, Use as instructed, Disp: 1 each, Rfl: 0    budesonide-formoterol 160-4.5 mcg (SYMBICORT) 160-4.5 mcg/actuation HFAA, Inhale 2 puffs into the lungs every 12 (twelve) hours. Wash out mouth after using, Disp: 1 Inhaler, Rfl: 12    clopidogrel (PLAVIX) 75 mg tablet, Take 1 tablet (75 mg total) by mouth once daily., Disp: 30 tablet, Rfl: 11    diclofenac (FLECTOR) 1.3 % PT12, Apply 1 packet topically once daily., Disp: , Rfl:     ergocalciferol (ERGOCALCIFEROL) 50,000 unit Cap, Take 1 capsule (50,000 Units total) by mouth every 7 days. Take on Mondays, Disp: 4 capsule, Rfl: 11    flash glucose scanning reader (FREESTYLE BRYAN 14 DAY READER) Misc, 1 Device by Misc.(Non-Drug; Combo Route) route as needed., Disp: 1 each, Rfl: 0    flash glucose sensor (FREESTYLE BRYAN 14 DAY SENSOR) Kit, 1 kit by Misc.(Non-Drug; Combo Route) route every 14 (fourteen) days., Disp: 2 kit, Rfl: 11    gabapentin (NEURONTIN) 300 MG capsule, Take 1 capsule (300 mg total) by mouth 3 (three) times daily. for 10 days, Disp: 30 capsule, Rfl: 0    HYDROcodone-acetaminophen (NORCO)  mg per tablet, , Disp: , Rfl:     hydrOXYzine (ATARAX) 50 MG tablet, 50 mg 3 (three) times daily as needed. , Disp: , Rfl:     isosorbide mononitrate (IMDUR) 30 MG 24 hr tablet, Take 1 tablet (30 mg total) by mouth once daily., Disp: 30 tablet, Rfl: 11    liraglutide 0.6 mg/0.1 mL, 18 mg/3  "mL, subq PNIJ (VICTOZA 2-KOKI) 0.6 mg/0.1 mL (18 mg/3 mL) PnIj, Inject 1.8 mg into the skin once daily., Disp: 9 mL, Rfl: 11    metoprolol tartrate (LOPRESSOR) 25 MG tablet, Take 1 tablet (25 mg total) by mouth 2 (two) times daily., Disp: 60 tablet, Rfl: 11    mupirocin calcium 2% (BACTROBAN) 2 % cream, Apply topically 3 (three) times daily., Disp: 30 g, Rfl: 1    mycophenolate (CELLCEPT) 500 mg Tab, Take 1 tablet (500 mg total) by mouth 2 (two) times daily., Disp: 120 tablet, Rfl: 0    naloxone (NARCAN) 4 mg/actuation Spry, 1 spray by Nasal route once., Disp: , Rfl:     naproxen (NAPROSYN) 500 MG tablet, Take 1 tablet by mouth as needed., Disp: , Rfl:     ondansetron (ZOFRAN-ODT) 4 MG TbDL, Take 1 tablet (4 mg total) by mouth every 8 (eight) hours as needed., Disp: 21 tablet, Rfl: 1    pen needle, diabetic (SURE-FINE PEN NEEDLES) 31 gauge x 3/16" Ndle, 1 each by Misc.(Non-Drug; Combo Route) route 4 (four) times daily with meals and nightly., Disp: 120 each, Rfl: 11    predniSONE (DELTASONE) 5 MG tablet, TAKE ONE TABLET BY MOUTH ONE TIME DAILY, Disp: 30 tablet, Rfl: 4    sodium bicarbonate 650 MG tablet, Take 4 tablets (2,600 mg total) by mouth 2 (two) times daily., Disp: 240 tablet, Rfl: 11    tacrolimus (PROGRAF) 0.5 MG Cap, TAKE THREE CAPSULES BY MOUTH EVERY 12 HOURS, Disp: 180 capsule, Rfl: 0    TRESIBA FLEXTOUCH U-100 100 unit/mL (3 mL) InPn, Inject 30 Units into the skin 2 (two) times daily., Disp: , Rfl:   No current facility-administered medications for this visit.     Review of patient's allergies indicates:  No Known Allergies    Family History   Problem Relation Age of Onset    Cancer Father     Diabetes Father     Heart failure Father     Stroke Father     Heart failure Mother     Kidney disease Neg Hx        Social History     Socioeconomic History    Marital status: Single     Spouse name: Not on file    Number of children: Not on file    Years of education: Not on file    Highest " education level: Not on file   Occupational History    Occupation: Disabled     Employer: DISABLED   Social Needs    Financial resource strain: Not on file    Food insecurity:     Worry: Not on file     Inability: Not on file    Transportation needs:     Medical: Not on file     Non-medical: Not on file   Tobacco Use    Smoking status: Former Smoker     Packs/day: 1.00     Years: 40.00     Pack years: 40.00     Last attempt to quit: 2013     Years since quittin.6    Smokeless tobacco: Never Used   Substance and Sexual Activity    Alcohol use: Yes     Alcohol/week: 3.6 oz     Types: 6 Cans of beer per week     Comment: seldom    Drug use: No    Sexual activity: Never   Lifestyle    Physical activity:     Days per week: Not on file     Minutes per session: Not on file    Stress: Not on file   Relationships    Social connections:     Talks on phone: Not on file     Gets together: Not on file     Attends [employer] service: Not on file     Active member of club or organization: Not on file     Attends meetings of clubs or organizations: Not on file     Relationship status: Not on file   Other Topics Concern    Not on file   Social History Narrative    Lives alone. Retired from construction and various jobs, now retired. Would like to return to work PT to alleviate boredom.       Review of Systems -  Constitutional: Denies having night sweats, constant fatigue, loss of appetite or recent substantial weight loss.  Eyes: Denies blurred vision or double vision.  Respiratory: Denies symptoms of shortness of breath, noisy breathing, hoarseness or chronic cough.  GI: Denies symptoms of heartburn, acid regurgitation, or the known presence of a hiatal hernia.  The remainder of a 10-point review of systems is negative    REVIEW OF RADIOLOGICAL FILMS AND RECORDS (PERSONALLY REVIEWED):  No bone or cervical LN involvement evident    REVIEW OF LABS (PERSONALLY REVIEWED)  Noncontributory    PHYSICAL EXAM:  Vitals  - There were no vitals taken for this visit.  Constitutional -      General Appearance: well developed, well nourished, without obvious deformities apart from 6cm mobile fungating mass of L temple     Communication: speaks with a normal voice without hoarseness  Head & Face -     Overall: no obvious scars, lesions or masses     Parotid and submandibular glands: no masses or tenderness     Facial strength: normal and equal bilaterally  Eyes -      EOM intact  Ear, Nose, Mouth & Throat -     Ears: both left and right external auditory canals and TM's are normal, no external deformities     Nasal exam: mucosa is pink, septum is midline, visible turbinates are normal on anterior rhinoscopy     Mastication: teeth appear present     Oral Cavity and oropharynx: mucosa, hard and soft palates, tongue, posterior pharyngeal wall, lips and gums are without lesions. Tonsils appear unseen  Respiratory:     Breathing unlabored, no stridor  Cardiovascular:     No JVD, capillary refill normal  Larynx: using the mirror for indirect laryngoscopy, the epiglottic, false cords, true cords, and pyriform sinuses are without lesions and the true vocal cords move normally  Neck: appears symmetric, and on palpation is without masses   Endocrine:     Thyroid: no asymmetry, thyromegaly, or thyroid nodules on palpation  Lymphatic:     No cervical lymphadenopathy  Cranial Nerves:      II: Pupillary reflexes normal     III, IV, VI: EOM normal     V: 1,2,3: normal sensation     VII: Normal strength in all divisions     IX, X: Normal voice, palatal elevation and sensation     XI: Shoulder strength normal       XII: Tongue mobility normal  Psychiatric:     Appropriate affect    ASSESSMENT: 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 He will likely need to continue his anticoagulants owing to recent MI, and unfortunately due to his immunosuppressed status I do not feel we can  delay his cancer resection by much. Will plan on treatment in 2 stages, a short excision and application of ACell with bolster dressing, followed by a short skin graft to be placed 1 week later once negative margins confirmed. He agrees and wishes to proceed.       Lenard Alarcon

## 2019-09-09 ENCOUNTER — ANESTHESIA EVENT (OUTPATIENT)
Dept: SURGERY | Facility: HOSPITAL | Age: 64
End: 2019-09-09
Payer: MEDICARE

## 2019-09-09 ENCOUNTER — TELEPHONE (OUTPATIENT)
Dept: CARDIOLOGY | Facility: HOSPITAL | Age: 64
End: 2019-09-09

## 2019-09-09 NOTE — PRE ADMISSION SCREENING
Anesthesia Assessment: Preoperative EQUATION    Planned Procedure: Procedure(s) (LRB):  APPLICATION, GRAFT, SKIN, FULL-THICKNESS (Left)  Requested Anesthesia Type:General  Surgeon: Lenard Alarcon MD  Service: Plastics  Known or anticipated Date of Surgery:10/7/2019    Surgeon notes: reviewed    Electronic QUestionnaire Assessment completed via nurse interview with patient.        NO AQ        Triage considerations:     The patient has no apparent active cardiac condition (No unstable coronary Syndrome such as severe unstable angina or recent [<1 month] myocardial infarction, decompensated CHF, severe valvular   disease or significant arrhythmia)    Previous anesthesia records:GETA, MAC and No problems   Airway/Jaw/Neck:  Airway Findings: Mouth Opening: Normal Tongue: Normal  General Airway Assessment: Adult  Mallampati: II  Improves to II with phonation.  TM Distance: Normal, at least 6 cm  Jaw/Neck Findings:  Neck ROM: Normal ROM       09/21/18 Placement Time: 1204 Method of Intubation: Direct laryngoscopy Inserted by: CRNA Airway Device: Endotracheal Tube Intubated: Postinduction Blade: Martinez #2 Airway Device Size: 7.0 Style: Cuffed Cuff Inflation: Minimal occlusive pressure Placement Verified By: Auscultation;Capnometry Grade: Grade I Complicating Factors: None Findings Post-Intubation: Positive EtCO2;Bilateral breath sounds;Atraumatic/Condition of teeth unchanged Depth of Insertion (cm): 20 Secured at: Lips Complications: None Breath Sounds: Equal Bilateral Insertion attempts (enter comment if more than 2 attempts): 1  Last PCP note: > 1 year ago   Subspecialty notes: Cardiology: General, ENT, DERM, NEUROSURGERY    Last PCP note: > 1 year ago   Subspecialty notes: Cardiology: General, ENT, DERM, NEUROSURGERY    Other important co-morbidities: CAD, CHF (DIATOLIC), GERD, HISTORY OF HEP C, HTN, HLD, PAD, PVD, DM, RECENT MI (7/21/19)     Tests already available:  Available tests,  within 3 months , within [facility]  .7/24/19-EKG, CBC, CMP            Instructions given. (See in Nurse's note)    Optimization:  Anesthesia Preop Clinic Assessment  Indicated-NOT INDICATED    Medical Opinion Indicated       Sub-specialist consult indicated: [facility] CARDIOLOGY WITH MEDICATION INSTRUCTIONS      Plan:    Testing:  N/A     Consultation:[facility] CARDIOLOGY WITH MEDICATION INSTRUCTIONS     Patient  has previously scheduled Medical Appointment:9/23    Navigation:               Consults scheduled.             Results will be tracked by Preop Clinic.

## 2019-09-09 NOTE — TELEPHONE ENCOUNTER
Pt needs appt in Cards clinic to discuss risks/benefits of surgery and holding asa, plavix. Please schedule with mid level this week.    Dr Valdez

## 2019-09-09 NOTE — PRE ADMISSION SCREENING
Anesthesia Assessment: Preoperative EQUATION    Planned Procedure: Procedure(s) (LRB):  EXCISION, SKIN (Left)  Requested Anesthesia Type:General  Surgeon: Lenard Alarcon MD  Service: Plastics  Known or anticipated Date of Surgery:9/27/2019    Surgeon notes: reviewed    Electronic QUestionnaire Assessment completed via nurse interview with patient.        NO AQ        Triage considerations:     The patient has no apparent active cardiac condition (No unstable coronary Syndrome such as severe unstable angina or recent [<1 month] myocardial infarction, decompensated CHF, severe valvular   disease or significant arrhythmia)    Previous anesthesia records:GETA, MAC and No problems  Airway/Jaw/Neck:  Airway Findings: Mouth Opening: Normal Tongue: Normal  General Airway Assessment: Adult  Mallampati: II  Improves to II with phonation.  TM Distance: Normal, at least 6 cm  Jaw/Neck Findings:  Neck ROM: Normal ROM       09/21/18 Placement Time: 1204 Method of Intubation: Direct laryngoscopy Inserted by: CRNA Airway Device: Endotracheal Tube Intubated: Postinduction Blade: Martinez #2 Airway Device Size: 7.0 Style: Cuffed Cuff Inflation: Minimal occlusive pressure Placement Verified By: Auscultation;Capnometry Grade: Grade I Complicating Factors: None Findings Post-Intubation: Positive EtCO2;Bilateral breath sounds;Atraumatic/Condition of teeth unchanged Depth of Insertion (cm): 20 Secured at: Lips Complications: None Breath Sounds: Equal Bilateral Insertion attempts (enter comment if more than 2 attempts): 1  Last PCP note: > 1 year ago   Subspecialty notes: Cardiology: General, ENT, DERM, NEUROSURGERY    Other important co-morbidities: CAD, CHF (DIATOLIC), GERD, HISTORY OF HEP C, HTN, HLD, PAD, PVD, DM, RECENT MI (7/21/19)     Tests already available:  [facility]  [facility]7/24/19-EKG, CBC, CMP            Instructions given. (See in Nurse's note)    Optimization:  Anesthesia Preop Clinic  Assessment  Indicated-NOT INDICATED    Medical Opinion Indicated       Sub-specialist consult indicated:   [facility] WITH MEDICATION INSTRUCTIONS           Plan:    Testing:  N/A     Consultation: [facility] CARDIOLOGY WITH MEDICATION INSTRUCTIONS     Patient  has previously scheduled Medical Appointment:9/23    Navigation: Consults scheduled.             Results will be tracked by Preop Clinic.

## 2019-09-09 NOTE — TELEPHONE ENCOUNTER
----- Message from Clarice Baker RN sent at 9/9/2019  9:14 AM CDT -----   & Toribio Interiano,    This patient is scheduled for surgery (Skin excision) for skin cancer left temple on 9/27/19 with . This will last approximately 120 min under general anesthesia. Requesting cardiac clearance prior to this procedure along with medication instructions (Plavix & ASA). Please advise. Will await your response.                                                                Thanks so much,                                                                            Clarice Baker RN, [facility] Pre-op

## 2019-09-09 NOTE — ANESTHESIA PREPROCEDURE EVALUATION
Clarice Baker RN   Registered Nurse      Pre Admission Screening   Signed                       []Hide copied text    [][place] for details  Anesthesia Assessment: Preoperative EQUATION     Planned Procedure: Procedure(s) (LRB):  APPLICATION, GRAFT, SKIN, FULL-THICKNESS (Left)  Requested Anesthesia Type:General  Surgeon: Lenard Alarcon MD  Service: Plastics  Known or anticipated Date of Surgery:10/7/2019     Surgeon notes: reviewed     Electronic QUestionnaire Assessment completed via nurse interview with patient.         NO AQ           Triage considerations:      The patient has no apparent active cardiac condition (No unstable coronary Syndrome such as severe unstable angina or recent [<1 month] myocardial infarction, decompensated CHF, severe valvular   disease or significant arrhythmia)     Previous anesthesia records:GETA, MAC and No problems   Airway/Jaw/Neck:  Airway Findings: Mouth Opening: Normal Tongue: Normal  General Airway Assessment: Adult  Mallampati: II  Improves to II with phonation.  TM Distance: Normal, at least 6 cm  Jaw/Neck Findings:  Neck ROM: Normal ROM       09/21/18 Placement Time: 1204 Method of Intubation: Direct laryngoscopy Inserted by: CRNA Airway Device: Endotracheal Tube Intubated: Postinduction Blade: Martinez #2 Airway Device Size: 7.0 Style: Cuffed Cuff Inflation: Minimal occlusive pressure Placement Verified By: Auscultation;Capnometry Grade: Grade I Complicating Factors: None Findings Post-Intubation: Positive EtCO2;Bilateral breath sounds;Atraumatic/Condition of teeth unchanged Depth of Insertion (cm): 20 Secured at: Lips Complications: None Breath Sounds: Equal Bilateral Insertion attempts (enter comment if more than 2 attempts): 1  Last PCP note: > 1 year ago   Subspecialty notes: Cardiology: General, ENT, DERM, NEUROSURGERY     Last PCP note: > 1 year ago   Subspecialty notes: Cardiology: General, ENT, DERM, NEUROSURGERY     Other important co-morbidities: CAD, CHF  (DIATOLIC), GERD, HISTORY OF HEP C, HTN, HLD, PAD, PVD, DM, RECENT MI (7/21/19)     Tests already available:  Available tests,  within 3 months , within [facility] .7/24/19-EKG, CBC, CMP                            Instructions given. (See in Nurse's note)     Optimization:  Anesthesia Preop Clinic Assessment  Indicated-NOT INDICATED    Medical Opinion Indicated                            Sub-specialist consult indicated: [facility] WITH MEDICATION INSTRUCTIONS                Plan:    Testing:  N/A                           Consultation:[facility] WITH MEDICATION INSTRUCTIONS                           Patient  has previously scheduled Medical Appointment:9/23     Navigation:                          Consults scheduled.                        Results will be tracked by Preop Clinic.         Moderate periop risk of CV events for moderate risk procedure.  No chest pain, active arrhythmia and CHF symptoms.  Ok to proceed to the scheduled surgery without further cardiac study.  OK to hold Aspirin and plavix 7 days before the procedure and resume ASAP postop.   Continue Metoprolol and Statin periop.  Avoid periop fluid overload.                                                                                                                 09/09/2019  Lavelle Ladd is a 64 y.o., male.    Anesthesia Evaluation    I have reviewed the Patient Summary Reports.    I have reviewed the Nursing Notes.      Review of Systems  Anesthesia Hx:  No problems with previous Anesthesia    Hematology/Oncology:  Hematology Normal   Oncology Normal     EENT/Dental:EENT/Dental Normal   Cardiovascular:   Hypertension CAD   Angina CHF    Pulmonary:   COPD Asthma    Renal/:   Chronic Renal Disease    Hepatic/GI:   PUD, GERD Liver Disease, Hepatitis    Musculoskeletal:   Arthritis     Neurological:   Neuromuscular Disease,    Endocrine:   Diabetes    Dermatological:  Skin Normal    Psych:  Psychiatric Normal           Physical  Exam  General:  Well nourished    Airway/Jaw/Neck:  Airway Findings: Mouth Opening: Normal Tongue: Normal  General Airway Assessment: Adult  Mallampati: II  TM Distance: Normal, at least 6 cm        Eyes/Ears/Nose:  EYES/EARS/NOSE FINDINGS: Normal   Dental:  Dental Findings: In tact   Chest/Lungs:  Chest/Lungs Clear    Heart/Vascular:  Heart Findings: Normal Heart murmur: negative Vascular Findings: Normal    Abdomen:  Abdomen Findings: Normal    Musculoskeletal:  Musculoskeletal Findings: Normal   Skin:  Skin Findings: Normal    Mental Status:  Mental Status Findings: Normal     Anesthesia Hx:  History of prior surgery of interest to airway management or planning: heart surgery. Previous anesthesia: General 7/21/19 LEFT CARDIAC CATH with general anesthesia.  Denies Family Hx of Anesthesia complications.   Denies Personal Hx of Anesthesia complications.   Social:  Social Alcohol Use, Former Smoker    Hematology/Oncology:         -- Anemia: Skin  Current/Recent Cancer. Oncology Comments: MALIGNANT NEOPLASM OF SKIN, LEFT TEMPLE     EENT/Dental:EENT/Dental Normal   Cardiovascular:    Denies Angina. hyperlipidemia  Functional Capacity 2 METS, RIGHT BKA  Coronary Artery Disease: Hx of Myocardial Infarction, MI was within 1 year, STEMI, NSTEMI date of 7/21/19  Congestive Heart Failure (CHF) , LV Diastolic HF, on chronic Rx, no recent change PVD Peripheral Arterial Disease  Hypertension    Pulmonary:  Pulmonary Normal  Denies Shortness of breath.  Denies Recent URI.    Renal/:   KIDNEY TRANSPLANT 5/21/16. CHRONIC RENAL INSUFFICIENCY. RENAL HTN. Denies Renal Symptoms/Infections/Stones  Kidney Function/Disease S/P Kidney Transplant, functioning, stable.   Hepatic/GI:   GERD  Liver Disease, Hepatitis, chronic, Viral Hepatitis Type C    Musculoskeletal:  Musculoskeletal General/Symptoms: R BKA Joint Disease:  Arthritis  Denies Bone Disorder    Neurological:  Neurology Normal    Endocrine:   VIT B12 DEFICIENCY Diabetes, Type  2 Diabetes    Psych:  Psychiatric Normal                     Anesthesia Plan  Type of Anesthesia, risks & benefits discussed:  Anesthesia Type:  general  Patient's Preference:   Intra-op Monitoring Plan:   Intra-op Monitoring Plan Comments:   Post Op Pain Control Plan:   Post Op Pain Control Plan Comments:   Induction:   IV  Beta Blocker:  Patient is on a Beta-Blocker and has received one dose within the past 24 hours (No further documentation required).       Informed Consent: Patient understands risks and agrees with Anesthesia plan.  Questions answered. Anesthesia consent signed with patient.  ASA Score: 4     Day of Surgery Review of History & Physical:    H&P update referred to the surgeon.         Ready For Surgery From Anesthesia Perspective.

## 2019-09-09 NOTE — ANESTHESIA PREPROCEDURE EVALUATION
Clarice Baker RN   Registered Nurse      Pre Admission Screening   Signed                       []Hide copied text    [][place] for details  Anesthesia Assessment: Preoperative EQUATION     Planned Procedure: Procedure(s) (LRB):  EXCISION, SKIN (Left)  Requested Anesthesia Type:General  Surgeon: Lenard Alarcon MD  Service: Plastics  Known or anticipated Date of Surgery:9/27/2019     Surgeon notes: reviewed     Electronic QUestionnaire Assessment completed via nurse interview with patient.         NO AQ           Triage considerations:      The patient has no apparent active cardiac condition (No unstable coronary Syndrome such as severe unstable angina or recent [<1 month] myocardial infarction, decompensated CHF, severe valvular   disease or significant arrhythmia)     Previous anesthesia records:GETA, MAC and No problems  Airway/Jaw/Neck:  Airway Findings: Mouth Opening: Normal Tongue: Normal  General Airway Assessment: Adult  Mallampati: II  Improves to II with phonation.  TM Distance: Normal, at least 6 cm  Jaw/Neck Findings:  Neck ROM: Normal ROM       09/21/18 Placement Time: 1204 Method of Intubation: Direct laryngoscopy Inserted by: CRNA Airway Device: Endotracheal Tube Intubated: Postinduction Blade: Martinez #2 Airway Device Size: 7.0 Style: Cuffed Cuff Inflation: Minimal occlusive pressure Placement Verified By: Auscultation;Capnometry Grade: Grade I Complicating Factors: None Findings Post-Intubation: Positive EtCO2;Bilateral breath sounds;Atraumatic/Condition of teeth unchanged Depth of Insertion (cm): 20 Secured at: Lips Complications: None Breath Sounds: Equal Bilateral Insertion attempts (enter comment if more than 2 attempts): 1  Last PCP note: > 1 year ago   Subspecialty notes: Cardiology: General, ENT, DERM, NEUROSURGERY     Other important co-morbidities: CAD, CHF (DIATOLIC), GERD, HISTORY OF HEP C, HTN, HLD, PAD, PVD, DM, RECENT MI (7/21/19)     Tests already available:  Available tests,   within 3 months , within [facility] .7/24/19-EKG, CBC, CMP                            Instructions given. (See in Nurse's note)     Optimization:  Anesthesia Preop Clinic Assessment  Indicated-NOT INDICATED    Medical Opinion Indicated                            Sub-specialist consult indicated:   [facility] WITH MEDICATION INSTRUCTIONS                                           Plan:    Testing:  N/A                           Consultation: [facility] WITH MEDICATION INSTRUCTIONS                           Patient  has previously scheduled Medical Appointment:9/23     Navigation: Consults scheduled.                        Results will be tracked by Preop Clinic.      Moderate periop risk of CV events for moderate risk procedure.  No chest pain, active arrhythmia and CHF symptoms.  Ok to proceed to the scheduled surgery without further cardiac study.  OK to hold Aspirin and plavix 7 days before the procedure and resume ASAP postop.   Continue Metoprolol and Statin periop.  Avoid periop fluid overload.                                                                                                                  09/09/2019  Lavelle Ladd is a 64 y.o., male.    Anesthesia Evaluation    I have reviewed the Patient Summary Reports.     I have reviewed the Medications.     Review of Systems  Anesthesia Hx:  No problems with previous Anesthesia  History of prior surgery of interest to airway management or planning: heart surgery. Previous anesthesia: General 7/21/19 LEFT CARDIAC CATH with general anesthesia.  Denies Family Hx of Anesthesia complications.   Denies Personal Hx of Anesthesia complications.   Social:  Social Alcohol Use, Former Smoker    Hematology/Oncology:         -- Anemia: Skin  Current/Recent Cancer. Oncology Comments: MALIGNANT NEOPLASM OF SKIN, LEFT TEMPLE     EENT/Dental:EENT/Dental Normal   Cardiovascular:   Hypertension Past MI CAD    Denies Angina. hyperlipidemia  Functional Capacity 2  METS, RIGHT BKA  Coronary Artery Disease: Hx of Myocardial Infarction, MI was within 1 year, STEMI, NSTEMI date of 7/21/19  Congestive Heart Failure (CHF) , LV Diastolic HF, on chronic Rx, no recent change PVD Peripheral Arterial Disease  Hypertension    Pulmonary:  Pulmonary Normal  Denies Shortness of breath.  Denies Recent URI.    Renal/:   Chronic Renal Disease KIDNEY TRANSPLANT 5/21/16. CHRONIC RENAL INSUFFICIENCY. RENAL HTN. Denies Renal Symptoms/Infections/Stones  Kidney Function/Disease S/P Kidney Transplant, functioning, stable.   Hepatic/GI:   GERD  Liver Disease, Hepatitis, chronic, Viral Hepatitis Type C    Musculoskeletal:  Musculoskeletal General/Symptoms: R BKA Joint Disease:  Arthritis  Denies Bone Disorder    Neurological:  Neurology Normal    Endocrine:   Diabetes VIT B12 DEFICIENCY Diabetes, Type 2 Diabetes    Psych:  Psychiatric Normal           Physical Exam  General:  Well nourished    Airway/Jaw/Neck:  Airway Findings: Mouth Opening: Normal Tongue: Normal  General Airway Assessment: Adult  Mallampati: I  TM Distance: Normal, at least 6 cm      Dental:  Dental Findings: In tact        Mental Status:  Mental Status Findings:  Cooperative, Alert and Oriented         Anesthesia Plan  Type of Anesthesia, risks & benefits discussed:  Anesthesia Type:  general  Patient's Preference:   Intra-op Monitoring Plan: standard ASA monitors  Intra-op Monitoring Plan Comments:   Post Op Pain Control Plan: per primary service following discharge from PACU and multimodal analgesia  Post Op Pain Control Plan Comments:   Induction:   IV  Beta Blocker:  Patient is not currently on a Beta-Blocker (No further documentation required).       Informed Consent: Patient understands risks and agrees with Anesthesia plan.  Questions answered. Anesthesia consent signed with patient.  ASA Score: 3     Day of Surgery Review of History & Physical:    H&P update referred to the surgeon.         Ready For Surgery From  Anesthesia Perspective.

## 2019-09-11 ENCOUNTER — OFFICE VISIT (OUTPATIENT)
Dept: CARDIOLOGY | Facility: CLINIC | Age: 64
End: 2019-09-11
Payer: MEDICARE

## 2019-09-11 VITALS
DIASTOLIC BLOOD PRESSURE: 58 MMHG | HEIGHT: 71 IN | HEART RATE: 70 BPM | BODY MASS INDEX: 29.97 KG/M2 | WEIGHT: 214.06 LBS | SYSTOLIC BLOOD PRESSURE: 98 MMHG

## 2019-09-11 DIAGNOSIS — I13.10 MALIGNANT HTN WITH HEART DISEASE, W/O CHF, WITH CHRONIC KIDNEY DISEASE: ICD-10-CM

## 2019-09-11 DIAGNOSIS — I12.9 RENAL HYPERTENSION: Chronic | ICD-10-CM

## 2019-09-11 DIAGNOSIS — I25.118 CORONARY ARTERY DISEASE OF NATIVE ARTERY OF NATIVE HEART WITH STABLE ANGINA PECTORIS: ICD-10-CM

## 2019-09-11 DIAGNOSIS — I10 ESSENTIAL HYPERTENSION: ICD-10-CM

## 2019-09-11 DIAGNOSIS — I25.10 CAD IN NATIVE ARTERY: Primary | ICD-10-CM

## 2019-09-11 DIAGNOSIS — I73.9 PERIPHERAL VASCULAR DISEASE: ICD-10-CM

## 2019-09-11 DIAGNOSIS — E78.2 MIXED HYPERLIPIDEMIA: ICD-10-CM

## 2019-09-11 PROBLEM — I21.19 ACUTE ST ELEVATION MYOCARDIAL INFARCTION (STEMI) OF INFERIOR WALL: Status: RESOLVED | Noted: 2019-07-21 | Resolved: 2019-09-11

## 2019-09-11 PROBLEM — R94.31 ABNORMAL ECG: Status: RESOLVED | Noted: 2019-07-21 | Resolved: 2019-09-11

## 2019-09-11 PROBLEM — I21.3 STEMI (ST ELEVATION MYOCARDIAL INFARCTION): Status: RESOLVED | Noted: 2019-07-21 | Resolved: 2019-09-11

## 2019-09-11 PROCEDURE — 3078F DIAST BP <80 MM HG: CPT | Mod: HCNC,CPTII,S$GLB, | Performed by: INTERNAL MEDICINE

## 2019-09-11 PROCEDURE — 99499 UNLISTED E&M SERVICE: CPT | Mod: HCNC,S$GLB,, | Performed by: INTERNAL MEDICINE

## 2019-09-11 PROCEDURE — 99499 RISK ADDL DX/OHS AUDIT: ICD-10-PCS | Mod: HCNC,S$GLB,, | Performed by: INTERNAL MEDICINE

## 2019-09-11 PROCEDURE — 3074F SYST BP LT 130 MM HG: CPT | Mod: HCNC,CPTII,S$GLB, | Performed by: INTERNAL MEDICINE

## 2019-09-11 PROCEDURE — 1101F PR PT FALLS ASSESS DOC 0-1 FALLS W/OUT INJ PAST YR: ICD-10-PCS | Mod: HCNC,CPTII,S$GLB, | Performed by: INTERNAL MEDICINE

## 2019-09-11 PROCEDURE — 99999 PR PBB SHADOW E&M-EST. PATIENT-LVL III: ICD-10-PCS | Mod: PBBFAC,HCNC,, | Performed by: INTERNAL MEDICINE

## 2019-09-11 PROCEDURE — 99999 PR PBB SHADOW E&M-EST. PATIENT-LVL III: CPT | Mod: PBBFAC,HCNC,, | Performed by: INTERNAL MEDICINE

## 2019-09-11 PROCEDURE — 3074F PR MOST RECENT SYSTOLIC BLOOD PRESSURE < 130 MM HG: ICD-10-PCS | Mod: HCNC,CPTII,S$GLB, | Performed by: INTERNAL MEDICINE

## 2019-09-11 PROCEDURE — 1101F PT FALLS ASSESS-DOCD LE1/YR: CPT | Mod: HCNC,CPTII,S$GLB, | Performed by: INTERNAL MEDICINE

## 2019-09-11 PROCEDURE — 99214 PR OFFICE/OUTPT VISIT, EST, LEVL IV, 30-39 MIN: ICD-10-PCS | Mod: HCNC,S$GLB,, | Performed by: INTERNAL MEDICINE

## 2019-09-11 PROCEDURE — 3078F PR MOST RECENT DIASTOLIC BLOOD PRESSURE < 80 MM HG: ICD-10-PCS | Mod: HCNC,CPTII,S$GLB, | Performed by: INTERNAL MEDICINE

## 2019-09-11 PROCEDURE — 99214 OFFICE O/P EST MOD 30 MIN: CPT | Mod: HCNC,S$GLB,, | Performed by: INTERNAL MEDICINE

## 2019-09-11 NOTE — PROGRESS NOTES
Subjective:   Patient ID:  Lavelle Ladd is a 64 y.o. male who presents for cardiac consult of Pre-op Exam      HPI  The patient came in today for cardiac consult of Pre-op Exam        Lavelle Ladd is a 64 y.o. male pt with CAD s/p mult PCI, Pt has h/o CRI, DM, HTN, HLP, PAD, right BKA, transmetatarsal amputation of left foot, renal transplant in May 2016, CAD and immunosuppressed post transplant.     8/5/19 visit with Amol Alas, pt of Dr. Valdez  He presents to clinic today for hospital follow up. Presented to [facility] on 7/21/19 with acute anterolateral and inferior STEMI. Cath showed occluded apical LAD, diffuse non obstructive CAD elsewhere. Medically managed.  Patient hydrated with IVF the day after angiogram due to bump in creatine to 1.7. No ACEI ordered upon DC.     9/11/19  Pt is here for pre-op CV eval - scheduled for surgery (Skin excision) for skin cancer left temple on 9/27/19 with . Last [facility] 7/19 medically managed post STEMI. No CP/SOB.     Patient feels no chest pain, no sob, no leg swelling, no PND, no palpitation, no dizziness, no syncope, no CNS symptoms.    Patient is compliant with medications.    CONCLUSIONS     1 - Normal left ventricular systolic function (EF 60-65%).     2 - Indeterminate LV diastolic function.     3 - Normal right ventricular systolic function .     4 - Wall motion abnormalities.     5 - Moderate left atrial enlargement.       This document has been electronically    SIGNED BY: Andrew Valdez MD On: 07/21/2019 11:21    7/21/19 [facility]  1. Routine post-cath care.  2. Recommend maximal medical management for apical LAD occlusion (small tortuous vessel and very distal occlusion).   3. Risk factor reductions.  4. ASA 81mg.  5. Clopidogrel (Plavix) for one year.  6. Maximize medical tx for PAD.    Past Medical History:   Diagnosis Date    DINORAH (acute kidney injury) 9/25/2016    Arthritis     CAD in native artery 7/21/2019    CHF (congestive heart failure)     Chronic  obstructive pulmonary disease 2016    Coronary artery disease involving native coronary artery of native heart without angina pectoris 2016    CRI (chronic renal insufficiency) 2019     donor kidney transplant for DM 16     Induction with Thymo x3 and IV solumedrol to total 875mg  Kidney Biopsy  2016: 16 glomeruli, ACR type 1 AVR type 2, significant microcirculatory changes, c4d negative, No DSA, 5 to10% fibrosis. Treated with thymo x8 2016- no rejection      Diastolic heart failure     Encounter for blood transfusion     ESRD on RRT since 10/2013 10/29/2013    Biopsy proven diabetic nephropathy and lymphoplasmacytic interstitial infiltrate not c/w with AIN (ddx sjogrens or assoc with tamm-horsefall protein extravasation)     GERD (gastroesophageal reflux disease)     History of hepatitis C, s/p successful Rx w/ SVR12 - 2017    Completed 12 weeks harvoni w/ SVR    Hyperlipidemia     Hypertension     PAD (peripheral artery disease) 2019    PIC line (peripherally inserted central catheter) flush     Prophylactic immunotherapy     Proteinuria     PVD (peripheral vascular disease) 2017    RLE BKA CT 16 Extensive atherosclerotic disease of the aorta and mesenteric arteries.     Renal hypertension     Type 2 diabetes mellitus with diabetic neuropathy, with long-term current use of insulin 2016    Vitamin B12 deficiency        Past Surgical History:   Procedure Laterality Date    AMPUTATION, FOOT, TRANSMETATARSAL Left 2018    Performed by [facility]la Wheeler DPM at [facility] OR    AMPUTATION, FOOT, TRANSMETATARSAL Left 10/31/2018    Performed by [facility] Wheeler D[facility] at [facility] OR    AMPUTATION, FOOT, TRANSMETATARSAL Left 2018    Performed by [facility] at [facility] OR    av bovine graft      Left UE    AV FISTULA PLACEMENT      left UE    BIOPSY Tranplanted Kidney N/A 8/15/2016    Performed by [facility] at [facility]  OR 2ND FLR    BIOPSY, LIVER, TRANSJUGULAR APPROACH N/A 2014    [facility] CATH LAB    CARDIAC CATHETERIZATION  2015    CATHETERIZATION, HEART, LEFT Left 2019    Performed by Andrew Valdez MD at [facility] CATH LAB    CLOSURE, WOUND Left 2018    Performed by Karla Wheelre DPM at [facility] OR    CLOSURE, WOUND Left 2018    Performed by Karla Wheeler DPM at [facility] OR    DEBRIDEMENT, METATARSAL BONE, 2 OR MORE BONES Left 2018    Performed by Karla Wheeler DPM at [facility] OR    INSERTION, CATHETER, VASCULAR N/A 10/31/2013    Performed by Ralph Mckeon MD at [facility] CATH LAB    IRRIGATION AND DEBRIDEMENT Left 2018    Performed by Katerina Magaña DPM at [facility] OR    IRRIGATION AND DEBRIDEMENT, LOWER EXTREMITY Left 10/31/2018    Performed by Karla Wheeler DPM at [facility] OR    KIDNEY TRANSPLANT  2016    LEFT LEG ANGIOGRAM N/A 2018    Performed by Donal Mcdonald MD at [facility] CATH LAB    LEG AMPUTATION THROUGH KNEE      right LE, started as nail puncture leading to diabetic ulcer    LENGTHENING, TENDON, ACHILLES Left 2018    Performed by Karla Wheeler DPM at [facility] OR    TRANSPLANT-KIDNEY Right 2016    Performed by Ronny Bergeron MD at [facility] OR 2ND FLR       Social History     Tobacco Use    Smoking status: Former Smoker     Packs/day: 1.00     Years: 40.00     Pack years: 40.00     Last attempt to quit: 2013     Years since quittin.6    Smokeless tobacco: Never Used   Substance Use Topics    Alcohol use: Yes     Alcohol/week: 3.6 oz     Types: 6 Cans of beer per week     Comment: seldom    Drug use: No       Family History   Problem Relation Age of Onset    Cancer Father     Diabetes Father     Heart failure Father     Stroke Father     Heart failure Mother     Kidney disease Neg Hx        Patient's Medications   New Prescriptions    No medications on file   Previous Medications    AMLODIPINE  (NORVASC) 10 MG TABLET    Take 1 tablet (10 mg total) by mouth once daily.    ASPIRIN (ECOTRIN) 81 MG EC TABLET    Take 1 tablet (81 mg total) by mouth once daily.    ATORVASTATIN (LIPITOR) 80 MG TABLET    Take 1 tablet (80 mg total) by mouth once daily.    BD INSULIN SYRINGE ULTRA-FINE 1 ML 31 GAUGE X 5/16 SYRG    USE ONE AS DIRECTED FOUR TIMES DAILY WITH MEALS AND AT BEDTIME    BLOOD SUGAR DIAGNOSTIC STRP    1 each by Misc.(Non-Drug; Combo Route) route 3 (three) times daily.    BLOOD-GLUCOSE METER KIT    Use as instructed    CLOPIDOGREL (PLAVIX) 75 MG TABLET    Take 1 tablet (75 mg total) by mouth once daily.    DICLOFENAC (FLECTOR) 1.3 % PT12    Apply 1 packet topically once daily.    ERGOCALCIFEROL (ERGOCALCIFEROL) 50,000 UNIT CAP    Take 1 capsule (50,000 Units total) by mouth every 7 days. Take on Mondays    FLASH GLUCOSE SCANNING READER (FREESTYLE BRYAN 14 DAY READER) MISC    1 Device by Misc.(Non-Drug; Combo Route) route as needed.    FLASH GLUCOSE SENSOR (FREESTYLE BRYAN 14 DAY SENSOR) KIT    1 kit by Misc.(Non-Drug; Combo Route) route every 14 (fourteen) days.    GABAPENTIN (NEURONTIN) 300 MG CAPSULE    Take 1 capsule (300 mg total) by mouth 3 (three) times daily. for 10 days    HYDROCODONE-ACETAMINOPHEN (NORCO)  MG PER TABLET        ISOSORBIDE MONONITRATE (IMDUR) 30 MG 24 HR TABLET    Take 1 tablet (30 mg total) by mouth once daily.    LIRAGLUTIDE 0.6 MG/0.1 ML, 18 MG/3 ML, SUBQ PNIJ (VICTOZA 2-KOKI) 0.6 MG/0.1 ML (18 MG/3 ML) PNIJ    Inject 1.8 mg into the skin once daily.    METOPROLOL TARTRATE (LOPRESSOR) 25 MG TABLET    Take 1 tablet (25 mg total) by mouth 2 (two) times daily.    MUPIROCIN CALCIUM 2% (BACTROBAN) 2 % CREAM    Apply topically 3 (three) times daily.    MYCOPHENOLATE (CELLCEPT) 500 MG TAB    Take 1 tablet (500 mg total) by mouth 2 (two) times daily.    NALOXONE (NARCAN) 4 MG/ACTUATION SPRY    1 spray by Nasal route once.    ONDANSETRON (ZOFRAN-ODT) 4 MG TBDL    Take 1 tablet (4 mg  "total) by mouth every 8 (eight) hours as needed.    PEN NEEDLE, DIABETIC (SURE-FINE PEN NEEDLES) 31 GAUGE X 3/16" NDLE    1 each by Misc.(Non-Drug; Combo Route) route 4 (four) times daily with meals and nightly.    PREDNISONE (DELTASONE) 5 MG TABLET    TAKE ONE TABLET BY MOUTH ONE TIME DAILY    SODIUM BICARBONATE 650 MG TABLET    Take 4 tablets (2,600 mg total) by mouth 2 (two) times daily.    TACROLIMUS (PROGRAF) 0.5 MG CAP    TAKE THREE CAPSULES BY MOUTH EVERY 12 HOURS    TRESIBA FLEXTOUCH U-100 100 UNIT/ML (3 ML) INPN    Inject 30 Units into the skin 2 (two) times daily.   Modified Medications    No medications on file   Discontinued Medications    No medications on file       Review of Systems   Constitutional: Negative.    HENT: Negative.    Eyes: Negative.    Respiratory: Negative.    Cardiovascular: Negative.  Negative for chest pain.   Gastrointestinal: Negative.    Genitourinary: Negative.    Musculoskeletal: Negative.    Skin: Negative.    Neurological: Negative.    Endo/Heme/Allergies: Negative.    Psychiatric/Behavioral: Negative.    All 12 systems otherwise negative.      Wt Readings from Last 3 Encounters:   09/11/19 97.1 kg (214 lb 1.1 oz)   09/06/19 97.1 kg (214 lb)   08/05/19 97.1 kg (214 lb)     Temp Readings from Last 3 Encounters:   09/06/19 98.2 °F (36.8 °C)   07/24/19 99.6 °F (37.6 °C) (Oral)   07/23/19 97 °F (36.1 °C) (Axillary)     BP Readings from Last 3 Encounters:   09/11/19 (!) 98/58   09/06/19 (!) 162/94   08/05/19 111/63     Pulse Readings from Last 3 Encounters:   09/11/19 70   09/06/19 82   08/05/19 67       BP (!) 98/58   Pulse 70   Ht 5' 11" (1.803 m)   Wt 97.1 kg (214 lb 1.1 oz)   BMI 29.86 kg/m²     Objective:   Physical Exam   Constitutional: He is oriented to person, place, and time. He appears well-developed and well-nourished. No distress.   HENT:   Head: Normocephalic and atraumatic.   Nose: Nose normal.   Mouth/Throat: Oropharynx is clear and moist.   Eyes: Conjunctivae " and EOM are normal. No scleral icterus.   Neck: Normal range of motion. Neck supple. No JVD present. No thyromegaly present.   Cardiovascular: Normal rate, regular rhythm, S1 normal and S2 normal. Exam reveals no gallop, no S3, no S4 and no friction rub.   Murmur heard.  Pulmonary/Chest: Effort normal and breath sounds normal. No stridor. No respiratory distress. He has no wheezes. He has no rales. He exhibits no tenderness.   Abdominal: Soft. Bowel sounds are normal. He exhibits no distension and no mass. There is no tenderness. There is no rebound.   Genitourinary:   Genitourinary Comments: Deferred   Musculoskeletal: Normal range of motion. He exhibits no edema, tenderness or deformity.   Lymphadenopathy:     He has no cervical adenopathy.   Neurological: He is alert and oriented to person, place, and time. He exhibits normal muscle tone. Coordination normal.   Skin: Skin is warm and dry. No rash noted. He is not diaphoretic. No erythema. No pallor.   Psychiatric: He has a normal mood and affect. His behavior is normal. Judgment and thought content normal.   Nursing note and vitals reviewed.      Lab Results   Component Value Date     (L) 08/05/2019    K 5.0 08/05/2019     08/05/2019    CO2 15 (L) 08/05/2019    BUN 14 08/05/2019    CREATININE 1.5 (H) 09/06/2019     (H) 08/05/2019    HGBA1C 6.5 (H) 07/21/2019    MG 1.7 07/21/2019    AST 14 07/24/2019    ALT 12 07/24/2019    ALBUMIN 2.5 (L) 07/24/2019    PROT 5.9 (L) 07/24/2019    BILITOT 0.9 07/24/2019    WBC 11.14 07/24/2019    HGB 12.5 (L) 07/24/2019    HCT 38.2 (L) 07/24/2019    MCV 90 07/24/2019     07/24/2019    INR 1.0 07/24/2019    TSH 0.909 07/21/2019    CHOL 123 07/21/2019    HDL 45 07/21/2019    LDLCALC 59.4 (L) 07/21/2019    TRIG 93 07/21/2019     (H) 09/18/2018     Assessment:      1. CAD in native artery    2. Peripheral vascular disease    3. Coronary artery disease of native artery of native heart with stable angina  pectoris    4. Malignant HTN with heart disease, w/o CHF, with chronic kidney disease    5. Renal hypertension    6. Mixed hyperlipidemia    7. Essential hypertension        Plan:   1. Pre-OP CV evaluation prior to surgery (Skin excision) for skin cancer left temple on 9/27/19 with   Moderate periop risk of CV events for moderate risk procedure.  No chest pain, active arrhythmia and CHF symptoms.  Ok to proceed to the scheduled surgery without further cardiac study.  OK to hold Aspirin and plavix 7 days before the procedure and resume ASAP postop.   Continue Metoprolol and Statin periop.  Avoid periop fluid overload.    2. HTN  - cont meds    3. CAD  - cont asa, statin, bb, plavix, imdur    4.HLD  - cont statin    5. CKD  - cont to monitor    Follow up with Dr. Valdez or Amol Interiano    Thank you for allowing me to participate in this patient's care. Please do not hesitate to contact me with any questions or concerns. Consult note has been forwarded to the referral physician.     Michael Contreras MD, [facility]  Cardiovascular Disease  [facility], [place]  [phone] (P)

## 2019-09-12 ENCOUNTER — DOCUMENTATION ONLY (OUTPATIENT)
Dept: EMERGENCY MEDICINE | Facility: HOSPITAL | Age: 64
End: 2019-09-12

## 2019-09-12 NOTE — PROGRESS NOTES
Chart audit performed on this Hep C known positive patient.  It is noted on 4/5/2017 that patient had successful treatment with Harvoni

## 2019-09-16 DIAGNOSIS — Z94.0 KIDNEY REPLACED BY TRANSPLANT: Primary | ICD-10-CM

## 2019-09-16 RX ORDER — MYCOPHENOLATE MOFETIL 500 MG/1
TABLET ORAL
Qty: 120 TABLET | Refills: 2 | Status: SHIPPED | OUTPATIENT
Start: 2019-09-16 | End: 2019-09-23 | Stop reason: SINTOL

## 2019-09-16 NOTE — MR AVS SNAPSHOT
[facility]  [address]  [place] [address]  Phone: [phone]  Fax: [phone]                  Lavelle Ladd   3/1/2017 10:00 AM   Office Visit    Description:  Male : 1955   Provider:  Colin Garcia MD   Department:  [facility]           Reason for Visit     Pneumonia           Diagnoses this Visit        Comments    SOB (shortness of breath)    -  Primary     Asthma with COPD         Chronic obstructive pulmonary disease, unspecified COPD type                To Do List           Future Appointments        Provider Department Dept Phone    3/7/2017 8:30 AM Dana Morillo RD, CDE [facility] - Diabetes Management [phone]    4/3/2017 9:50 AM LABORATORY, [facility] [phone]    2017 9:20 AM PULMONARY LAB, [facility] Pul[facility] Function [facility] [phone]    2017 10:00 AM Colin Garcia MD [facility] [phone]      Goals (5 Years of Data)     None      Follow-Up and Disposition     Return in about 6 months (around 2017) for PFT.       These Medications        Disp Refills Start End    budesonide-formoterol 160-4.5 mcg (SYMBICORT) 160-4.5 mcg/actuation HFAA 1 Inhaler 12 3/1/2017 3/1/2018    Inhale 2 puffs into the lungs every 12 (twelve) hours. Wash out mouth after using - Inhalation    Pharmacy: [id number] [place] - [address] [address] Ph #: [phone]       albuterol-ipratropium 2.5mg-0.5mg/3mL (DUO-NEB) 0.5 mg-3 mg(2.5 mg base)/3 mL nebulizer solution 120 vial 12 3/1/2017 3/1/2018    Take 3 mLs by nebulization every 6 (six) hours. - Nebulization    Pharmacy: [id number] - [place] - [address] [address] Ph #: [phone]         [facility] On Call     [facility] - 24/7 Assistance  Registered nurses in the [facility] provide clinical advisement, health education, appointment booking, and other advisory services.  Call for  this free service at [phone].             Medications           Message regarding Medications     Verify the changes and/or additions to your medication regime listed below are the same as discussed with your clinician today.  If any of these changes or additions are incorrect, please notify your healthcare provider.        STOP taking these medications     paroxetine (PAXIL) 10 MG tablet Take 1 tablet (10 mg total) by mouth every morning.           Verify that the below list of medications is an accurate representation of the medications you are currently taking.  If none reported, the list may be blank. If incorrect, please contact your healthcare provider. Carry this list with you in case of emergency.           Current Medications     aspirin (ECOTRIN) 81 MG EC tablet Take 1 tablet (81 mg total) by mouth once daily.    ergocalciferol (ERGOCALCIFEROL) 50,000 unit Cap Take 1 capsule (50,000 Units total) by mouth every 7 days. Take on Mondays    famotidine (PEPCID) 20 MG tablet Take 1 tablet (20 mg total) by mouth every evening.    inhalation device (BREATHERITE VALVED MDI CHAMBER) Use as directed for inhalation.    insulin NPH (NOVOLIN N) 100 unit/mL injection Take 20 units subq with breakfast and 8 units at bedtime    k phos di & mono-sod phos mono (K-PHOS-NEUTRAL) 250 mg Tab Take 2 tablets by mouth 3 (three) times daily.    lancets Misc 1 each by Misc.(Non-Drug; Combo Route) route 3 (three) times daily.    magnesium oxide (MAG-OX) 400 mg tablet Take 1 tablet (400 mg total) by mouth 2 (two) times daily.    multivitamin (THERAGRAN) tablet Take 1 tablet by mouth once daily.    mycophenolate (CELLCEPT) 250 mg Cap Take 2 capsules (500 mg total) by mouth 2 (two) times daily. Z94.0 Kidney Transplant 5/21/2016.    olanzapine (ZYPREXA) 5 MG tablet Take 1 tablet (5 mg total) by mouth every evening.    ondansetron (ZOFRAN-ODT) 4 MG TbDL Take 2 tablets (8 mg total) by mouth every 8 (eight) hours as needed (nausea).  "   oxycodone (ROXICODONE) 15 MG Tab Take 1 tablet (15 mg total) by mouth every 6 (six) hours as needed for Pain.    pen needle, diabetic (EASY COMFORT PEN NEEDLES) 32 gauge x 5/32" Ndle Inject 1 each into the skin 3 (three) times daily.    predniSONE (DELTASONE) 10 MG tablet 20 mg PO QD 12/3-1/2; 15 mg PO QD 1/3-2/2; 10 mg PO QD 2/3-3/2; 5 mg thereafter    sodium bicarbonate 650 MG tablet Take 4 tablets BID  four hours before or after Harvoni    tacrolimus (PROGRAF) 1 MG Cap Take 3mg in the AM and 2mg in the PM by mouth. Z94.0 Kidney Transplant    tramadol (ULTRAM-ER) 100 MG Tb24 Take 100 mg by mouth as needed.    albuterol-ipratropium 2.5mg-0.5mg/3mL (DUO-NEB) 0.5 mg-3 mg(2.5 mg base)/3 mL nebulizer solution Take 3 mLs by nebulization every 6 (six) hours.    atorvastatin (LIPITOR) 20 MG tablet Take 1 tablet (20 mg total) by mouth once daily. While on Harvoni.    atovaquone (MEPRON) 750 mg/5 mL Susp Take 10 mLs (1,500 mg total) by mouth once daily.    BD INSULIN PEN NEEDLE UF SHORT 31 gauge x 5/16" Ndle     blood sugar diagnostic Strp 1 each by Misc.(Non-Drug; Combo Route) route 3 (three) times daily.    blood sugar diagnostic Strp 1 each by Misc.(Non-Drug; Combo Route) route 4 (four) times daily.    blood-glucose meter kit Use as instructed    budesonide-formoterol 160-4.5 mcg (SYMBICORT) 160-4.5 mcg/actuation HFAA Inhale 2 puffs into the lungs every 12 (twelve) hours. Wash out mouth after using    ERTAPENEM SODIUM (ERTAPENEM, INVANZ, 1 G/100 ML NS, READY TO MIX,) Inject 100 mLs (1 g total) into the vein once daily.    metoprolol tartrate (LOPRESSOR) 50 MG tablet Take 0.5 tablets (25 mg total) by mouth 2 (two) times daily.    pen needle, diabetic 31 gauge x 1/4" Ndle 1 each by Misc.(Non-Drug; Combo Route) route 4 (four) times daily.           Clinical Reference Information           Your Vitals Were     BP                   124/76           Blood Pressure          Most Recent Value    BP  124/76    "   Allergies as of 3/1/2017     Tylenol [Acetaminophen]      Immunizations Administered on Date of Encounter - 3/1/2017     None      Orders Placed During Today's Visit     Future Labs/Procedures Expected by Expires    Complete PFT with bronchodilator  As directed 3/1/2018    X-Ray Chest PA And Lateral  As directed 3/1/2018      Maintenance Dialysis History     Start End Type Comments Center    10/30/2013 5/21/2016 Hemo  [facility]            Current Dialysis Center Information     No center information to display.            Transplant Information        Txp Date Organ Coordinator Care Team    5/21/2016 Kidney Rosita Newton, MAYKEL Referring Physician:  Avel Estrada MD   Current Nephrologist:  Avel Estrada MD   Surgeon:  Ronny Bergeron MD   Assisting Surgeon:  Terrell Navarrete MD   Resident:  Jose aDvid Rowley MD         Instructions      Budesonide; Formoterol Inhalation  What is this medicine?  BUDESONIDE; FORMOTEROL (byoo SELENA oh nide; for MOH te rol) inhalation is a combination of two medicines that decrease inflammation and help to open up the airways in your lungs. It is used to treat asthma. Do NOT use for an acute asthma attack.  How should I use this medicine?  This medicine is inhaled through the mouth. Rinse your mouth with water after use. Make sure not to swallow the water. Follow the directions on your prescription label. Do not use more often than directed. Do not stop taking except on your doctor's advice. Make sure that you are using your inhaler correctly. Ask your doctor or health care provider if you have any questions.  A special MedGuide will be given to you by the pharmacist with each prescription and refill. Be sure to read this information carefully each time.  Talk to your pediatrician regarding the use of this medicine in children. While this drug may be prescribed for children as young as 12 years of age for selected conditions, precautions do apply.  What side effects  may I notice from receiving this medicine?  Side effects that you should report to your doctor or health care professional as soon as possible:  · allergic reactions such as skin rash or itching, hives, swelling of the face, lips or tongue  · breathing problems  · changes in vision  · chest pain  · fast, irregular heartbeat  · feeling faint or lightheaded, falls  · fever  · high blood pressure  · nervousness  · tremors  · white patches or sores in mouth  Side effects that usually do not require medical attention (report to your doctor or health care professional if they continue or are bothersome):  · cough  · different taste in mouth  · headache  · sore throat  · stuffy nose  · stomach upset  What may interact with this medicine?  Do not take this medicine with any of the following medications:  · MAOIs like Carbex, Eldepryl, Marplan, Nardil, and Parnate  · mifepristone  · probucol  · procarbazine  · some other medicines for asthma like formoterol, salmeterol  This medicine may also interact with the following medications:  · antibiotics like clarithromycin, erythromycin  · cimetidine  · diuretics  · grapefruit juice  · itraconazole  · ketoconazole  · medicines for depression, anxiety, or psychotic disturbances  · medicines for irregular heartbeat  · methadone  · some heart medicines like atenolol, metoprolol  · some other medicines for breathing problems  · some vaccines  What if I miss a dose?  If you miss a dose, use it as soon as you remember. If it is almost time for your next dose, use only that dose and continue with your regular schedule, spacing doses evenly. Do not use double or extra doses.  Where should I keep my medicine?  Keep out of the reach of children.  Store in a dry place at room temperature between 20 and 25 degrees C (68 and 77 degrees F). Do not get the inhaler wet. Keep track of the number of doses used. Throw away the inhaler after using the marked number of inhalations or after the  expiration date, whichever comes first. Do not burn or puncture canister.  What should I tell my health care provider before I take this medicine?  They need to know if you have any of these conditions:  · bone problems  · diabetes  · heart disease or irregular heartbeat  · high blood pressure  · immune system problems  · infection  · liver disease  · worsening asthma  · an unusual or allergic reaction to budesonide, formoterol, medicines, foods, dyes, or preservatives  · pregnant or trying to get pregnant  · breast-feeding  What should I watch for while using this medicine?  Tell your doctor or health care professional if your symptoms do not improve or get worse. If you need to use your short-acting inhalers more often, call your doctor right away. Do not use more than every 12 hours.  If you have asthma, be aware that using this medicine may increase your risk of dying from asthma related problems. Talk to your doctor about the risks and benefits of taking this medicine. NEVER use this medicine for an acute asthma attack.  This medicine may increase your risk of getting an infection. Tell your doctor or health care professional if you are around anyone with measles or chickenpox, or if you develop sores or blisters that do not heal properly.  Date Last Reviewed:   NOTE:This sheet is a summary. It may not cover all possible information. If you have questions about this medicine, talk to your doctor, pharmacist, or health care provider. Copyright© 2016 [employer]        Albuterol inhalation aerosol  What is this medicine?  ALBUTEROL (al BYOO ter ole) is a bronchodilator. It helps open up the airways in your lungs to make it easier to breathe. This medicine is used to treat and to prevent bronchospasm.  How should I use this medicine?  This medicine is for inhalation through the mouth. Follow the directions on your prescription label. Take your medicine at regular intervals. Do not use more often than directed.  Make sure that you are using your inhaler correctly. Ask you doctor or health care provider if you have any questions.  Talk to your pediatrician regarding the use of this medicine in children. Special care may be needed.  What side effects may I notice from receiving this medicine?  Side effects that you should report to your doctor or health care professional as soon as possible:  · allergic reactions like skin rash, itching or hives, swelling of the face, lips, or tongue  · breathing problems  · chest pain  · feeling faint or lightheaded, falls  · high blood pressure  · irregular heartbeat  · fever  · muscle cramps or weakness  · pain, tingling, numbness in the hands or feet  · vomiting  Side effects that usually do not require medical attention (report to your doctor or health care professional if they continue or are bothersome):  · cough  · difficulty sleeping  · headache  · nervousness or trembling  · stomach upset  · stuffy or runny nose  · throat irritation  · unusual taste  What may interact with this medicine?  · anti-infectives like chloroquine and pentamidine  · caffeine  · cisapride  · diuretics  · medicines for colds  · medicines for depression or for emotional or psychotic conditions  · medicines for weight loss including some herbal products  · methadone  · some antibiotics like clarithromycin, erythromycin, levofloxacin, and linezolid  · some heart medicines  · steroid hormones like dexamethasone, cortisone, hydrocortisone  · theophylline  · thyroid hormones  What if I miss a dose?  If you miss a dose, use it as soon as you can. If it is almost time for your next dose, use only that dose. Do not use double or extra doses.  Where should I keep my medicine?  Keep out of the reach of children.  Store at room temperature between 15 and 30 degrees C (59 and 86 degrees F). The contents are under pressure and may burst when exposed to heat or flame. Do not freeze. This medicine does not work as well if it  is too cold. Throw away any unused medicine after the expiration date. Inhalers need to be thrown away after the labeled number of puffs have been used or by the expiration date; whichever comes first. Ventolin HFA should be thrown away 12 months after removing from foil pouch. Check the instructions that come with your medicine.  What should I tell my health care provider before I take this medicine?  They need to know if you have any of the following conditions:  · diabetes  · heart disease or irregular heartbeat  · high blood pressure  · pheochromocytoma  · seizures  · thyroid disease  · an unusual or allergic reaction to albuterol, levalbuterol, sulfites, other medicines, foods, dyes, or preservatives  · pregnant or trying to get pregnant  · breast-feeding  What should I watch for while using this medicine?  Tell your doctor or health care professional if your symptoms do not improve. Do not use extra albuterol. If your asthma or bronchitis gets worse while you are using this medicine, call your doctor right away.  If your mouth gets dry try chewing sugarless gum or sucking hard candy. Drink water as directed.  Date Last Reviewed:   NOTE:This sheet is a summary. It may not cover all possible information. If you have questions about this medicine, talk to your doctor, pharmacist, or health care provider. Copyright© 2016 [employer] Standard        Albuterol inhalation solution  What is this medicine?  ALBUTEROL (al BYOO ter ole) is a bronchodilator. It helps to open up the airways in your lungs to make it easier to breathe. This medicine is used to treat and to prevent bronchospasm.  How should I use this medicine?  This medicine is used in a nebulizer. Nebulizers make a liquid into an aerosol that you breathe in through your mouth or your mouth and nose into your lungs. You will be taught how to use your nebulizer. Follow the directions on your prescription label. Take your medicine at regular intervals. Do not use  more often than directed.  Talk to your pediatrician regarding the use of this medicine in children. Special care may be needed.  What side effects may I notice from receiving this medicine?  Side effects that you should report to your doctor or health care professional as soon as possible:  · allergic reactions like skin rash, itching or hives, swelling of the face, lips, or tongue  · breathing problems  · chest pain  · feeling faint or lightheaded, falls  · high blood pressure  · irregular heartbeat  · fever  · muscle cramps or weakness  · pain, tingling, numbness in the hands or feet  · vomiting  Side effects that usually do not require medical attention (report to your doctor or health care professional if they continue or are bothersome):  · cough  · difficulty sleeping  · headache  · nervousness, trembling  · stomach upset  · stuffy or runny nose  · throat irritation  · unusual taste  What may interact with this medicine?  · anti-infectives like chloroquine and pentamidine  · caffeine  · cisapride  · diuretics  · medicines for colds  · medicines for depression or emotional or psychotic conditions  · medicines for weight loss including some herbal products  · methadone  · some antibiotics like clarithromycin, erythromycin, levofloxacin, and linezolid  · some heart medicines  · steroid hormones like dexamethasone, cortisone, hydrocortisone  · theophylline  · thyroid hormones  What if I miss a dose?  If you miss a dose, use it as soon as you can. If it is almost time for your next dose, use only that dose. Do not use double or extra doses.  Where should I keep my medicine?  Keep out of the reach of children.  Store between 2 and 25 degrees C (36 and 77 degrees F). Do not freeze. Protect from light. Throw away any unused medicine after the expiration date. Most products are kept in the foil package until time of use. Some products can be used up to 1 week after they are removed from the foil pouch. Check the  instructions that come with your medicine.  What should I tell my health care provider before I take this medicine?  They need to know if you have any of the following conditions:  · diabetes  · heart disease or irregular heartbeat  · high blood pressure  · pheochromocytoma  · seizures  · thyroid disease  · an unusual or allergic reaction to albuterol, levalbuterol, sulfites, other medicines, foods, dyes, or preservatives  · pregnant or trying to get pregnant  · breast-feeding  What should I watch for while using this medicine?  Tell your doctor or health care professional if your symptoms do not improve. Do not use extra albuterol. Call your doctor right away if your asthma or bronchitis gets worse while you are using this medicine.  If your mouth gets dry try chewing sugarless gum or sucking hard candy. Drink water as directed.  Date Last Reviewed:   NOTE:This sheet is a summary. It may not cover all possible information. If you have questions about this medicine, talk to your doctor, pharmacist, or health care provider. Copyright© 2016 [employer] Standard             Language Assistance Services     ATTENTION: Language assistance services are available, free of charge. Please call [phone].      ATENCIÓN: Si habla artemio, tiene a hollis disposición servicios gratuitos de asistencia lingüística. Llame al [phone].     JENNIFER Ý: N?u b?n nói Ti?ng Vi?t, có các d?ch v? h? tr? ngôn ng? mi?n phí dành cho b?n. G?i s? [phone].         [facility] complies with applicable Federal civil rights laws and does not discriminate on the basis of race, color, national origin, age, disability, or sex.         16-Sep-2019 22:40

## 2019-09-17 NOTE — TELEPHONE ENCOUNTER
Call placed to patient to review upcoming appointments. Patient reluctantly agrees to complete labs  prior to appointment for review of IS. Patient reports recent heart attack and is scheduled for skin Ca+ removal.

## 2019-09-18 ENCOUNTER — TELEPHONE (OUTPATIENT)
Dept: NEPHROLOGY | Facility: CLINIC | Age: 64
End: 2019-09-18

## 2019-09-18 ENCOUNTER — TELEPHONE (OUTPATIENT)
Dept: TRANSPLANT | Facility: CLINIC | Age: 64
End: 2019-09-18

## 2019-09-18 ENCOUNTER — HOSPITAL ENCOUNTER (EMERGENCY)
Facility: HOSPITAL | Age: 64
Discharge: HOME | End: 2019-09-19
Attending: EMERGENCY MEDICINE
Payer: MEDICARE

## 2019-09-18 DIAGNOSIS — N39.0 URINARY TRACT INFECTION WITHOUT HEMATURIA, SITE UNSPECIFIED: ICD-10-CM

## 2019-09-18 DIAGNOSIS — N39.0 UTI (URINARY TRACT INFECTION), UNCOMPLICATED: Primary | ICD-10-CM

## 2019-09-18 DIAGNOSIS — C44.92 CANCER OF SKIN, SQUAMOUS CELL: Primary | ICD-10-CM

## 2019-09-18 PROBLEM — E86.1 HYPOTENSION DUE TO HYPOVOLEMIA: Status: ACTIVE | Noted: 2019-09-18

## 2019-09-18 LAB
ALBUMIN SERPL BCP-MCNC: 3 G/DL (ref 3.5–5.2)
ALP SERPL-CCNC: 96 U/L (ref 55–135)
ALT SERPL W/O P-5'-P-CCNC: 12 U/L (ref 10–44)
ANION GAP SERPL CALC-SCNC: 13 MMOL/L (ref 8–16)
AST SERPL-CCNC: 8 U/L (ref 10–40)
BASOPHILS # BLD AUTO: 0.01 K/UL (ref 0–0.2)
BASOPHILS NFR BLD: 0.1 % (ref 0–1.9)
BILIRUB SERPL-MCNC: 0.5 MG/DL (ref 0.1–1)
BILIRUB UR QL STRIP: NEGATIVE
BUN SERPL-MCNC: 18 MG/DL (ref 8–23)
CALCIUM SERPL-MCNC: 9.2 MG/DL (ref 8.7–10.5)
CHLORIDE SERPL-SCNC: 102 MMOL/L (ref 95–110)
CLARITY UR: CLEAR
CO2 SERPL-SCNC: 20 MMOL/L (ref 23–29)
COLOR UR: YELLOW
CREAT SERPL-MCNC: 1.7 MG/DL (ref 0.5–1.4)
DIFFERENTIAL METHOD: ABNORMAL
EOSINOPHIL # BLD AUTO: 0.1 K/UL (ref 0–0.5)
EOSINOPHIL NFR BLD: 1 % (ref 0–8)
ERYTHROCYTE [DISTWIDTH] IN BLOOD BY AUTOMATED COUNT: 15.8 % (ref 11.5–14.5)
EST. GFR  (AFRICAN AMERICAN): 48 ML/MIN/1.73 M^2
EST. GFR  (NON AFRICAN AMERICAN): 41 ML/MIN/1.73 M^2
GLUCOSE SERPL-MCNC: 257 MG/DL (ref 70–110)
GLUCOSE UR QL STRIP: NEGATIVE
HCT VFR BLD AUTO: 42.1 % (ref 40–54)
HGB BLD-MCNC: 13.8 G/DL (ref 14–18)
HGB UR QL STRIP: ABNORMAL
KETONES UR QL STRIP: NEGATIVE
LACTATE SERPL-SCNC: 1.7 MMOL/L (ref 0.5–2.2)
LEUKOCYTE ESTERASE UR QL STRIP: ABNORMAL
LYMPHOCYTES # BLD AUTO: 2.3 K/UL (ref 1–4.8)
LYMPHOCYTES NFR BLD: 28 % (ref 18–48)
MCH RBC QN AUTO: 29.1 PG (ref 27–31)
MCHC RBC AUTO-ENTMCNC: 32.8 G/DL (ref 32–36)
MCV RBC AUTO: 89 FL (ref 82–98)
MONOCYTES # BLD AUTO: 0.9 K/UL (ref 0.3–1)
MONOCYTES NFR BLD: 10.8 % (ref 4–15)
NEUTROPHILS # BLD AUTO: 4.9 K/UL (ref 1.8–7.7)
NEUTROPHILS NFR BLD: 60.3 % (ref 38–73)
NITRITE UR QL STRIP: POSITIVE
PH UR STRIP: 6 [PH] (ref 5–8)
PLATELET # BLD AUTO: 272 K/UL (ref 150–350)
PMV BLD AUTO: 9.5 FL (ref 9.2–12.9)
POTASSIUM SERPL-SCNC: 4.1 MMOL/L (ref 3.5–5.1)
PROCALCITONIN SERPL IA-MCNC: 0.1 NG/ML
PROT SERPL-MCNC: 7.2 G/DL (ref 6–8.4)
PROT UR QL STRIP: NEGATIVE
RBC # BLD AUTO: 4.75 M/UL (ref 4.6–6.2)
SODIUM SERPL-SCNC: 135 MMOL/L (ref 136–145)
SP GR UR STRIP: <=1.005 (ref 1–1.03)
URN SPEC COLLECT METH UR: ABNORMAL
UROBILINOGEN UR STRIP-ACNC: NEGATIVE EU/DL
WBC # BLD AUTO: 8.12 K/UL (ref 3.9–12.7)

## 2019-09-18 PROCEDURE — G0426 PR INPT TELEHEALTH CONSULT 50M: ICD-10-PCS | Mod: GT,HCNC,, | Performed by: INTERNAL MEDICINE

## 2019-09-18 PROCEDURE — 87186 SC STD MICRODIL/AGAR DIL: CPT | Mod: 59,HCNC

## 2019-09-18 PROCEDURE — 99284 EMERGENCY DEPT VISIT MOD MDM: CPT | Mod: 25,HCNC

## 2019-09-18 PROCEDURE — 80053 COMPREHEN METABOLIC PANEL: CPT | Mod: HCNC

## 2019-09-18 PROCEDURE — 85025 COMPLETE CBC W/AUTO DIFF WBC: CPT | Mod: 91,HCNC

## 2019-09-18 PROCEDURE — 87077 CULTURE AEROBIC IDENTIFY: CPT | Mod: 59,HCNC

## 2019-09-18 PROCEDURE — 83605 ASSAY OF LACTIC ACID: CPT | Mod: HCNC

## 2019-09-18 PROCEDURE — 36415 COLL VENOUS BLD VENIPUNCTURE: CPT | Mod: HCNC

## 2019-09-18 PROCEDURE — G0426 INPT/ED TELECONSULT50: HCPCS | Mod: GT,HCNC,, | Performed by: INTERNAL MEDICINE

## 2019-09-18 PROCEDURE — 87086 URINE CULTURE/COLONY COUNT: CPT | Mod: 59,HCNC

## 2019-09-18 PROCEDURE — 36000 PLACE NEEDLE IN VEIN: CPT | Mod: HCNC

## 2019-09-18 PROCEDURE — 84145 PROCALCITONIN (PCT): CPT | Mod: HCNC

## 2019-09-18 PROCEDURE — 81000 URINALYSIS NONAUTO W/SCOPE: CPT | Mod: 91,HCNC

## 2019-09-18 PROCEDURE — 87088 URINE BACTERIA CULTURE: CPT | Mod: 59,HCNC

## 2019-09-18 NOTE — TELEPHONE ENCOUNTER
"Called as req per dr anthony. He feels fine nothing out of the ordinary" informed of uti and culture to be done.9/18/1918/19/1036/sf  "

## 2019-09-18 NOTE — TELEPHONE ENCOUNTER
----- Message from Avel Estrada MD sent at 9/18/2019  9:51 AM CDT -----  Add urine culture and check with patient if he feels ok ?

## 2019-09-19 ENCOUNTER — TELEPHONE (OUTPATIENT)
Dept: HEMATOLOGY/ONCOLOGY | Facility: CLINIC | Age: 64
End: 2019-09-19

## 2019-09-19 VITALS
HEART RATE: 87 BPM | WEIGHT: 217 LBS | BODY MASS INDEX: 30.38 KG/M2 | RESPIRATION RATE: 18 BRPM | OXYGEN SATURATION: 97 % | DIASTOLIC BLOOD PRESSURE: 76 MMHG | HEIGHT: 71 IN | TEMPERATURE: 98 F | SYSTOLIC BLOOD PRESSURE: 172 MMHG

## 2019-09-19 LAB
BACTERIA #/AREA URNS HPF: ABNORMAL /HPF
MICROSCOPIC COMMENT: ABNORMAL
RBC #/AREA URNS HPF: 2 /HPF (ref 0–4)
WBC #/AREA URNS HPF: 30 /HPF (ref 0–5)

## 2019-09-19 PROCEDURE — 63600175 PHARM REV CODE 636 W HCPCS: Mod: HCNC | Performed by: EMERGENCY MEDICINE

## 2019-09-19 RX ORDER — LEVOFLOXACIN 750 MG/1
750 TABLET ORAL
Status: COMPLETED | OUTPATIENT
Start: 2019-09-19 | End: 2019-09-19

## 2019-09-19 RX ORDER — LEVOFLOXACIN 500 MG/1
500 TABLET, FILM COATED ORAL DAILY
Qty: 5 TABLET | Refills: 0 | Status: SHIPPED | OUTPATIENT
Start: 2019-09-19 | End: 2019-09-24

## 2019-09-19 RX ORDER — LEVOFLOXACIN 750 MG/1
TABLET ORAL
Status: DISCONTINUED
Start: 2019-09-19 | End: 2019-09-19 | Stop reason: HOSPADM

## 2019-09-19 RX ADMIN — LEVOFLOXACIN 750 MG: 750 TABLET, FILM COATED ORAL at 01:09

## 2019-09-19 NOTE — TELEPHONE ENCOUNTER
Abnormal urine results reviewed with patient, no abnormal symptoms noted. Patient reports mass on temple now draining pus. Denies fever. Reviewed with Dr. Boston. Patient advised to report to ED tonight for evaluation.

## 2019-09-19 NOTE — HPI
Patient is a 64-year-old male with history of kidney transplant in 2016.  Patient had 1 episode of acute rejection in 2016 with the kidney responding to adequate treatment.  Since then patient has had no rejection.        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    Patient comes to the emergency room with recurrent bleeding and pus drainage from the exfoliating mass on the left side of the scalp.  He had a urine drawn earlier today which had greater than 100 cells but it was not a clean catch urine.  Patient denies any fevers and chills but has poor appetite and has been running low blood pressure today.  Patient seen and evaluated in the emergency room using telemedicine protocol.

## 2019-09-19 NOTE — TELEPHONE ENCOUNTER
SW spoke with pt on the phone in regards to his distress screening. The pt did not express any needs at the moment.Will follow up next visit.    SENIA Zepeda Intern

## 2019-09-19 NOTE — SUBJECTIVE & OBJECTIVE
Past Medical History:   Diagnosis Date    DINORAH (acute kidney injury) 2016    Arthritis     CAD in native artery 2019    CHF (congestive heart failure)     Chronic obstructive pulmonary disease 2016    Coronary artery disease involving native coronary artery of native heart without angina pectoris 2016    CRI (chronic renal insufficiency) 2019     donor kidney transplant for DM 16     Induction with Thymo x3 and IV solumedrol to total 875mg  Kidney Biopsy  2016: 16 glomeruli, ACR type 1 AVR type 2, significant microcirculatory changes, c4d negative, No DSA, 5 to10% fibrosis. Treated with thymo x8 2016- no rejection      Diastolic heart failure     Encounter for blood transfusion     ESRD on RRT since 10/2013 10/29/2013    Biopsy proven diabetic nephropathy and lymphoplasmacytic interstitial infiltrate not c/w with AIN (ddx sjogrens or assoc with tamm-horsefall protein extravasation)     GERD (gastroesophageal reflux disease)     History of hepatitis C, s/p successful Rx w/ SVR12 - 2017    Completed 12 weeks harvoni w/ SVR    Hyperlipidemia     Hypertension     PAD (peripheral artery disease) 2019    PIC line (peripherally inserted central catheter) flush     Prophylactic immunotherapy     Proteinuria     PVD (peripheral vascular disease) 2017    RLE BKA CT 16 Extensive atherosclerotic disease of the aorta and mesenteric arteries.     Renal hypertension     Type 2 diabetes mellitus with diabetic neuropathy, with long-term current use of insulin 2016    Vitamin B12 deficiency        Past Surgical History:   Procedure Laterality Date    AMPUTATION, FOOT, TRANSMETATARSAL Left 2018    Performed by Karla Wheeler DPM at [facility] OR    AMPUTATION, FOOT, TRANSMETATARSAL Left 10/31/2018    Performed by Karla Wheeler DPM at [facility] OR    AMPUTATION, FOOT, TRANSMETATARSAL Left 2018    Performed by Karla WOODY  LASHONDA Wheeler at [facility] [place]    av bovine graft      Left UE    AV FISTULA PLACEMENT      left UE    BIOPSY Tranplanted Kidney N/A 8/15/2016    Performed by Paulette Hanna MD at [facility] OR 2ND FLR    BIOPSY, LIVER, TRANSJUGULAR APPROACH N/A 4/24/2014    Performed by [facility] Diagnostic Provider at [facility] CATH LAB    CARDIAC CATHETERIZATION  02/2015    CATHETERIZATION, HEART, LEFT Left 7/21/2019    Performed by Andrew Valdez MD at [facility]    CLOSURE, WOUND Left 11/5/2018    Performed by Karla Wheeler DPM at [facility]    CLOSURE, WOUND Left 9/24/2018    Performed by Karla Wheeler D[facility] at [facility]    DEBRIDEMENT, METATARSAL BONE, 2 OR MORE BONES Left 11/5/2018    Performed by Karla Wheeler DPM at [facility]    INSERTION, CATHETER, VASCULAR N/A 10/31/2013    Performed by Ralph Mckeon MD at [facility] C[facility]    IRRIGATION AND DEBRIDEMENT Left 7/9/2018    Performed by Katerina Magaña DPM at [facility]    IRRIGATION AND DEBRIDEMENT, LOWER EXTREMITY Left 10/31/2018    Performed by Karla Wheeler DPM at [facility]    KIDNEY TRANSPLANT  05/21/2016    LEFT LEG ANGIOGRAM N/A 6/14/2018    Performed by Donal Mcdonald MD at [facility] C[facility]    LEG AMPUTATION THROUGH KNEE  2011    right LE, started as nail puncture leading to diabetic ulcer    LENGTHENING, TENDON, ACHILLES Left 9/24/2018    Performed by Karla Wheeler DPM at [facility]    TRANSPLANT-KIDNEY Right 5/21/2016    Performed by Ronny Bergeron MD at [facility] OR 2ND FLR       Review of patient's allergies indicates:  No Known Allergies  No current facility-administered medications for this encounter.      Current Outpatient Medications   Medication    amLODIPine (NORVASC) 10 MG tablet    aspirin (ECOTRIN) 81 MG EC tablet    atorvastatin (LIPITOR) 80 MG tablet    BD INSULIN SYRINGE ULTRA-FINE 1 mL 31 gauge x 5/16 Syrg    blood sugar diagnostic Strp    blood-glucose meter kit    clopidogrel (PLAVIX) 75 mg tablet    diclofenac (FLECTOR) 1.3 % PT12     "ergocalciferol (ERGOCALCIFEROL) 50,000 unit Cap    flash glucose scanning reader (FREESTYLE BRYAN 14 DAY READER) Misc    flash glucose sensor (FREESTYLE BRYAN 14 DAY SENSOR) Kit    gabapentin (NEURONTIN) 300 MG capsule    HYDROcodone-acetaminophen (NORCO)  mg per tablet    isosorbide mononitrate (IMDUR) 30 MG 24 hr tablet    liraglutide 0.6 mg/0.1 mL, 18 mg/3 mL, subq PNIJ (VICTOZA 2-KOKI) 0.6 mg/0.1 mL (18 mg/3 mL) PnIj    metoprolol tartrate (LOPRESSOR) 25 MG tablet    mupirocin calcium 2% (BACTROBAN) 2 % cream    mycophenolate (CELLCEPT) 500 mg Tab    naloxone (NARCAN) 4 mg/actuation Spry    ondansetron (ZOFRAN-ODT) 4 MG TbDL    pen needle, diabetic (SURE-FINE PEN NEEDLES) 31 gauge x 3/16" Ndle    predniSONE (DELTASONE) 5 MG tablet    sodium bicarbonate 650 MG tablet    tacrolimus (PROGRAF) 0.5 MG Cap    TRESIBA FLEXTOUCH U-100 100 unit/mL (3 mL) InPn     Family History     Problem Relation (Age of Onset)    Cancer Father    Diabetes Father    Heart failure Father, Mother    Stroke Father        Tobacco Use    Smoking status: Former Smoker     Packs/day: 1.00     Years: 40.00     Pack years: 40.00     Last attempt to quit: 2013     Years since quittin.6    Smokeless tobacco: Never Used   Substance and Sexual Activity    Alcohol use: Yes     Alcohol/week: 3.6 oz     Types: 6 Cans of beer per week     Comment: seldom    Drug use: No    Sexual activity: Never     Review of Systems   Constitutional: Positive for activity change, appetite change and fatigue. Negative for chills, diaphoresis and fever.   HENT: Negative.  Negative for congestion and trouble swallowing.    Eyes: Negative.    Respiratory: Negative.  Negative for cough, chest tightness, shortness of breath and wheezing.    Cardiovascular: Negative.  Negative for chest pain, palpitations and leg swelling.   Gastrointestinal: Negative.  Negative for abdominal distention, abdominal pain, nausea and vomiting.   Genitourinary: " Negative.  Negative for decreased urine volume, difficulty urinating, dysuria, enuresis, flank pain, frequency, hematuria, penile swelling, scrotal swelling and urgency.   Musculoskeletal: Negative.  Negative for arthralgias, back pain, joint swelling and neck pain.   Skin: Negative for rash.        Exfoliating mass which is bleeding and has been draining pus on the left side of the frontal scalp see picture   Neurological: Negative.  Negative for tremors, seizures and headaches.   Psychiatric/Behavioral: Negative.  Negative for confusion and sleep disturbance. The patient is not nervous/anxious.    All other systems reviewed and are negative.    Objective:     Vital Signs (Most Recent):  Temp: 97.6 °F (36.4 °C) (09/18/19 2034)  Pulse: 90 (09/18/19 2034)  Resp: 18 (09/18/19 2034)  BP: (!) 87/65 (09/18/19 2034)  SpO2: 97 % (09/18/19 2034)  O2 Device (Oxygen Therapy): room air (09/18/19 2034) Vital Signs (24h Range):  Temp:  [97.6 °F (36.4 °C)] 97.6 °F (36.4 °C)  Pulse:  [90] 90  Resp:  [18] 18  SpO2:  [97 %] 97 %  BP: (87)/(65) 87/65     Weight: 98.4 kg (217 lb) (09/18/19 2034)  Body mass index is 30.27 kg/m².  Body surface area is 2.22 meters squared.    No intake/output data recorded.    Physical Exam   Constitutional: He is oriented to person, place, and time. He appears well-developed and well-nourished. No distress.   HENT:   Head: Normocephalic and atraumatic.   Eyes: Pupils are equal, round, and reactive to light.   Neck: Normal range of motion. Neck supple. No tracheal deviation present. No thyromegaly present.   Cardiovascular: Normal rate, regular rhythm, normal heart sounds and intact distal pulses. Exam reveals no gallop and no friction rub.   No murmur heard.  Pulmonary/Chest: Effort normal and breath sounds normal. He has no wheezes. He has no rales.   Abdominal: Soft. He exhibits no mass. There is no tenderness. There is no rebound and no guarding.   No allograft tenderness    Musculoskeletal: Normal  range of motion. He exhibits no edema.   S/p right BKA   s/p left transmetatarsal amputation   Lymphadenopathy:     He has no cervical adenopathy.   Neurological: He is alert and oriented to person, place, and time.   Skin: Skin is warm. Capillary refill takes less than 2 seconds. No rash noted. He is not diaphoretic. No erythema.   Left-sided scalp showing large tumor exfoliating covered by dressing   Psychiatric: He has a normal mood and affect. His behavior is normal. Judgment and thought content normal.   Nursing note and vitals reviewed.      Significant Labs:  All labs within the past 24 hours have been reviewed.    Significant Imaging:

## 2019-09-19 NOTE — ASSESSMENT & PLAN NOTE
Although objectively there is no weight loss in the last 2 weeks.  But the blood pressures running extremely low in the ER will give IV fluids and placed in observation.

## 2019-09-19 NOTE — ED PROVIDER NOTES
"SCRIBE #1 NOTE: I, Paulie Up, am scribing for, and in the presence of, Jackie Oleary MD. I have scribed the entire note.       History     Chief Complaint   Patient presents with    General Illness     kidney transplant pt; told to come here by transplant cooridinator for UTI; also c/o carcinoma on face bleeding "a lot." bleeding controlled in triage.     Review of patient's allergies indicates:  No Known Allergies      History of Present Illness     HPI    2019, 9:46 PM  History obtained from the patient      History of Present Illness: Lavelle Ladd is a 64 y.o. 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 Pt reports recent growth in the past several days and was referred to the ED by Dr. Rose for evaluation of a possible UTI. Pt states carcinoma was bleeding at home PTA but stopped before arrival to ED. Symptoms are constant and moderate in severity. No mitigating or exacerbating factors reported. No associated sxs reported. Patient denies any fever, chills, n/v, abd pain, CP, SOB, and all other sxs at this time. No prior Tx reported. Pt has an appointment with Dr Alarcon (Plastic Surgery) on  to remove the carcinoma. No further complaints or concerns at this time.         Arrival mode: Personal vehicle    PCP: Rishabh Esteban MD        Past Medical History:  Past Medical History:   Diagnosis Date    DINORAH (acute kidney injury) 2016    Arthritis     CAD in native artery 2019    CHF (congestive heart failure)     Chronic obstructive pulmonary disease 2016    Coronary artery disease involving native coronary artery of native heart without angina pectoris 2016    CRI (chronic renal insufficiency) 2019     donor kidney transplant for DM 16     Induction with Thymo x3 and IV solumedrol to total 875mg  Kidney Biopsy  2016: 16 glomeruli, ACR type 1 AVR " type 2, significant microcirculatory changes, c4d negative, No DSA, 5 to10% fibrosis. Treated with thymo x8 6/21/2016- no rejection      Diastolic heart failure     Encounter for blood transfusion     ESRD on RRT since 10/2013 10/29/2013    Biopsy proven diabetic nephropathy and lymphoplasmacytic interstitial infiltrate not c/w with AIN (ddx sjogrens or assoc with tamm-horsefall protein extravasation)     GERD (gastroesophageal reflux disease)     History of hepatitis C, s/p successful Rx w/ SVR12 - 4/2017 4/5/2017    Completed 12 weeks harvoni w/ SVR    Hyperlipidemia     Hypertension     PAD (peripheral artery disease) 7/21/2019    PIC line (peripherally inserted central catheter) flush     Prophylactic immunotherapy     Proteinuria     PVD (peripheral vascular disease) 6/26/2017    RLE BKA CT 12/11/16 Extensive atherosclerotic disease of the aorta and mesenteric arteries.     Renal hypertension     Type 2 diabetes mellitus with diabetic neuropathy, with long-term current use of insulin 12/1/2016    Vitamin B12 deficiency        Past Surgical History:  Past Surgical History:   Procedure Laterality Date    AMPUTATION, FOOT, TRANSMETATARSAL Left 11/5/2018    Performed by [facility] Wheeler D[facility] at [facility] OR    AMPUTATION, FOOT, TRANSMETATARSAL Left 10/31/2018    Performed by Karla Wheeler [facility] at [facility] OR    AMPUTATION, FOOT, TRANSMETATARSAL Left 9/21/2018    Performed by [facility] Wheeler [facility] at [facility] OR    av bovine graft      Left UE    AV FISTULA PLACEMENT      left UE    BIOPSY Tranplanted Kidney N/A 8/15/2016    Performed by [facility] at [facility] OR 2ND FLR    BIOPSY, LIVER, TRANSJUGULAR APPROACH N/A 4/24/2014    Performed by [facility] Diagnostic Provider at [facility] CATH LAB    CARDIAC CATHETERIZATION  02/2015    CATHETERIZATION, HEART, LEFT Left 7/21/2019    Performed by Andrew Valdez MD at [facility] CATH LAB    CLOSURE, WOUND Left 11/5/2018    Performed by Karla Wheeler D[facility] at [facility]  OR    CLOSURE, WOUND Left 2018    [facility] at [facility] OR    DEBRIDEMENT, METATARSAL BONE, 2 OR MORE BONES Left 2018    Performed by Kar[facility] Wheeler [facility] at [facility] OR    INSERTION, CATHETER, VASCULAR N/A 10/31/2013    Performed by Ralph Mckeon MD at [facility] CATH LAB    IRRIGATION AND DEBRIDEMENT Left 2018    Performed by Katerina Magaña DPM at [facility] OR    IRRIGATION AND DEBRIDEMENT, LOWER EXTREMITY Left 10/31/2018    Performed by Karla Wheeler D[facility] at [facility] OR    KIDNEY TRANSPLANT  2016    LEFT LEG ANGIOGRAM N/A 2018    Performed by Donal Mcdonald MD at [facility] CATH LAB    LEG AMPUTATION THROUGH KNEE      right LE, started as nail puncture leading to diabetic ulcer    LENGTHENING, TENDON, ACHILLES Left 2018    Performed by Kar[facility] Wheeler [facility] at [facility] OR    TRANSPLANT-KIDNEY Right 2016    Performed by [facility] at [facility] OR [facility] FLR         Family History:  Family History   Problem Relation Age of Onset    Cancer Father     Diabetes Father     Heart failure Father     Stroke Father     Heart failure Mother     Kidney disease Neg Hx        Social History:  Social History     Tobacco Use    Smoking status: Former Smoker     Packs/day: 1.00     Years: 40.00     Pack years: 40.00     Last attempt to quit: 2013     Years since quittin.6    Smokeless tobacco: Never Used   Substance and Sexual Activity    Alcohol use: Yes     Alcohol/week: 3.6 oz     Types: 6 Cans of beer per week     Comment: seldom    Drug use: No    Sexual activity: Never        Review of Systems     Review of Systems   Constitutional: Negative for chills and fever.   HENT: Negative for sore throat.    Respiratory: Negative for shortness of breath.    Cardiovascular: Negative for chest pain.   Gastrointestinal: Negative for abdominal pain, nausea and vomiting.   Genitourinary: Negative for dysuria.   Musculoskeletal: Negative for back pain.   Skin:  "Negative for rash.        + carcinoma to the left temple   Neurological: Negative for weakness.   Hematological: Does not bruise/bleed easily.   All other systems reviewed and are negative.       Physical Exam     Initial Vitals [09/18/19 2034]   BP Pulse Resp Temp SpO2   (!) 87/65 90 18 97.6 °F (36.4 °C) 97 %      MAP       --          Physical Exam  Nursing Notes and Vital Signs Reviewed.  Constitutional: Patient is in no acute distress. Well-developed and well-nourished.  Head: Atraumatic. Normocephalic.  Eyes: PERRL. EOM intact. Conjunctivae are not pale. No scleral icterus.  ENT: Mucous membranes are moist. Oropharynx is clear and symmetric.    Neck: Supple. Full ROM. No lymphadenopathy.  Cardiovascular: Regular rate. Regular rhythm. No murmurs, rubs, or gallops. Distal pulses are 2+ and symmetric.  Pulmonary/Chest: No respiratory distress. Clear to auscultation bilaterally. No wheezing or rales.  Abdominal: Soft and non-distended.  There is no tenderness.  No rebound, guarding, or rigidity. Good bowel sounds.  Genitourinary: No CVA tenderness  Musculoskeletal: Moves all extremities. No edema. No calf tenderness. Right BKA.  Skin: Warm and dry. Necrotic skin lesion to the left temple area. No active bleeding.  Neurological:  Alert, awake, and appropriate.  Normal speech.  No acute focal neurological deficits are appreciated.  Psychiatric: Normal affect. Good eye contact. Appropriate in content.     ED Course   Procedures  ED Vital Signs:  Vitals:    09/18/19 2034 09/18/19 2121 09/18/19 2201 09/19/19 0031   BP: (!) 87/65 (!) 135/59 126/69 (!) 181/84   Pulse: 90 87 86 87   Resp: 18      Temp: 97.6 °F (36.4 °C)      TempSrc: Oral      SpO2: 97% 97% 96% 97%   Weight: 98.4 kg (217 lb)      Height: 5' 11" (1.803 m)       09/19/19 0100   BP: (!) 172/76   Pulse: 87   Resp:    Temp:    TempSrc:    SpO2: 97%   Weight:    Height:        Abnormal Lab Results:  Labs Reviewed   CBC W/ AUTO DIFFERENTIAL - Abnormal; Notable " for the following components:       Result Value    Hemoglobin 13.8 (*)     RDW 15.8 (*)     All other components within normal limits   COMPREHENSIVE METABOLIC PANEL - Abnormal; Notable for the following components:    Sodium 135 (*)     CO2 20 (*)     Glucose 257 (*)     Creatinine 1.7 (*)     Albumin 3.0 (*)     AST 8 (*)     eGFR if  48 (*)     eGFR if non  41 (*)     All other components within normal limits   URINALYSIS, REFLEX TO URINE CULTURE - Abnormal; Notable for the following components:    Specific Gravity, UA <=1.005 (*)     Occult Blood UA Trace (*)     Nitrite, UA Positive (*)     Leukocytes, UA 2+ (*)     All other components within normal limits    Narrative:     Preferred Collection Type->Urine, Clean Catch   URINALYSIS MICROSCOPIC - Abnormal; Notable for the following components:    WBC, UA 30 (*)     Bacteria Many (*)     All other components within normal limits    Narrative:     Preferred Collection Type->Urine, Clean Catch   CULTURE, URINE   LACTIC ACID, PLASMA   PROCALCITONIN   TACROLIMUS LEVEL        All Lab Results:  Results for orders placed or performed during the hospital encounter of 09/18/19   CBC auto differential   Result Value Ref Range    WBC 8.12 3.90 - 12.70 K/uL    RBC 4.75 4.60 - 6.20 M/uL    Hemoglobin 13.8 (L) 14.0 - 18.0 g/dL    Hematocrit 42.1 40.0 - 54.0 %    Mean Corpuscular Volume 89 82 - 98 fL    Mean Corpuscular Hemoglobin 29.1 27.0 - 31.0 pg    Mean Corpuscular Hemoglobin Conc 32.8 32.0 - 36.0 g/dL    RDW 15.8 (H) 11.5 - 14.5 %    Platelets 272 150 - 350 K/uL    MPV 9.5 9.2 - 12.9 fL    Gran # (ANC) 4.9 1.8 - 7.7 K/uL    Lymph # 2.3 1.0 - 4.8 K/uL    Mono # 0.9 0.3 - 1.0 K/uL    Eos # 0.1 0.0 - 0.5 K/uL    Baso # 0.01 0.00 - 0.20 K/uL    Gran% 60.3 38.0 - 73.0 %    Lymph% 28.0 18.0 - 48.0 %    Mono% 10.8 4.0 - 15.0 %    Eosinophil% 1.0 0.0 - 8.0 %    Basophil% 0.1 0.0 - 1.9 %    Differential Method Automated    Comprehensive metabolic  panel   Result Value Ref Range    Sodium 135 (L) 136 - 145 mmol/L    Potassium 4.1 3.5 - 5.1 mmol/L    Chloride 102 95 - 110 mmol/L    CO2 20 (L) 23 - 29 mmol/L    Glucose 257 (H) 70 - 110 mg/dL    BUN, Bld 18 8 - 23 mg/dL    Creatinine 1.7 (H) 0.5 - 1.4 mg/dL    Calcium 9.2 8.7 - 10.5 mg/dL    Total Protein 7.2 6.0 - 8.4 g/dL    Albumin 3.0 (L) 3.5 - 5.2 g/dL    Total Bilirubin 0.5 0.1 - 1.0 mg/dL    Alkaline Phosphatase 96 55 - 135 U/L    AST 8 (L) 10 - 40 U/L    ALT 12 10 - 44 U/L    Anion Gap 13 8 - 16 mmol/L    eGFR if African American 48 (A) >60 mL/min/1.73 m^2    eGFR if non African American 41 (A) >60 mL/min/1.73 m^2   Urinalysis, Reflex to Urine Culture Urine, Clean Catch   Result Value Ref Range    Specimen UA Urine, Clean Catch     Color, UA Yellow Yellow, Straw, Jessa    Appearance, UA Clear Clear    pH, UA 6.0 5.0 - 8.0    Specific Gravity, UA <=1.005 (A) 1.005 - 1.030    Protein, UA Negative Negative    Glucose, UA Negative Negative    Ketones, UA Negative Negative    Bilirubin (UA) Negative Negative    Occult Blood UA Trace (A) Negative    Nitrite, UA Positive (A) Negative    Urobilinogen, UA Negative <2.0 EU/dL    Leukocytes, UA 2+ (A) Negative   Lactic acid, plasma   Result Value Ref Range    Lactate (Lactic Acid) 1.7 0.5 - 2.2 mmol/L   Procalcitonin   Result Value Ref Range    Procalcitonin 0.10 <0.25 ng/mL   Urinalysis Microscopic   Result Value Ref Range    RBC, UA 2 0 - 4 /hpf    WBC, UA 30 (H) 0 - 5 /hpf    Bacteria Many (A) None-Occ /hpf    Microscopic Comment SEE COMMENT      *Note: Due to a large number of results and/or encounters for the requested time period, some results have not been displayed. A complete set of results can be found in Results Review.         Imaging Results:  Imaging Results    None                 The Emergency Provider reviewed the vital signs and test results, which are outlined above.     ED Discussion     11:03 PM: Re-evaluated pt. Pt is resting comfortably and is  in no acute distress.  D/w pt all pertinent results. D/w pt any concerns expressed at this time. Answered all questions. Pt expresses understanding at this time.    12:27 AM: Discussed pt's case with Dr. Estrada (Nephrology) who recommends discharge home with abx.    1:05 AM: Reassessed pt at this time.  Pt states his condition has improved at this time. Discussed with pt all pertinent ED information and results. Discussed pt dx and plan of tx. Gave pt all f/u and return to the ED instructions. All questions and concerns were addressed at this time. Pt expresses understanding of information and instructions, and is comfortable with plan to discharge. Pt is stable for discharge.    I discussed with patient and/or family/caretaker that evaluation in the ED does not suggest any emergent or life threatening medical conditions requiring immediate intervention beyond what was provided in the ED, and I believe patient is safe for discharge.  Regardless, an unremarkable evaluation in the ED does not preclude the development or presence of a serious of life threatening condition. As such, patient was instructed to return immediately for any worsening or change in current symptoms.    I counseled the patient on the risks of taking antibiotics, including taking all doses as prescribed. I explained the risk of antibiotic resistance in the future and susceptibility to C. diff infection, severe allergic reactions, and yeast infections.            Medical Decision Making:   Clinical Tests:   Lab Tests: Ordered and Reviewed           ED Medication(s):  Medications   levoFLOXacin tablet 750 mg (750 mg Oral Given 9/19/19 0109)       Discharge Medication List as of 9/19/2019 12:44 AM      START taking these medications    Details   levoFLOXacin (LEVAQUIN) 500 MG tablet Take 1 tablet (500 mg total) by mouth once daily. for 5 days, Starting Thu 9/19/2019, Until Tue 9/24/2019, Normal              Medication List      START taking these  medications    levoFLOXacin 500 MG tablet  Commonly known as:  LEVAQUIN  Take 1 tablet (500 mg total) by mouth once daily. for 5 days        ASK your doctor about these medications    amLODIPine 10 MG tablet  Commonly known as:  NORVASC  Take 1 tablet (10 mg total) by mouth once daily.     aspirin 81 MG EC tablet  Commonly known as:  ECOTRIN  Take 1 tablet (81 mg total) by mouth once daily.     atorvastatin 80 MG tablet  Commonly known as:  LIPITOR  Take 1 tablet (80 mg total) by mouth once daily.     BD INSULIN SYRINGE ULTRA-FINE 1 mL 31 gauge x 5/16 Syrg  Generic drug:  insulin syringe-needle U-100  USE ONE AS DIRECTED FOUR TIMES DAILY WITH MEALS AND AT BEDTIME     blood sugar diagnostic Strp  1 each by Misc.(Non-Drug; Combo Route) route 3 (three) times daily.     blood-glucose meter kit  Use as instructed     clopidogrel 75 mg tablet  Commonly known as:  PLAVIX  Take 1 tablet (75 mg total) by mouth once daily.     diclofenac 1.3 % Pt12  Commonly known as:  FLECTOR     ergocalciferol 50,000 unit Cap  Commonly known as:  ERGOCALCIFEROL  Take 1 capsule (50,000 Units total) by mouth every 7 days. Take on Mondays     flash glucose scanning reader Misc  Commonly known as:  FREESTYLE BRYAN 14 DAY READER  1 Device by Misc.(Non-Drug; Combo Route) route as needed.     flash glucose sensor Kit  Commonly known as:  FREESTYLE BRYAN 14 DAY SENSOR  1 kit by Misc.(Non-Drug; Combo Route) route every 14 (fourteen) days.     gabapentin 300 MG capsule  Commonly known as:  NEURONTIN  Take 1 capsule (300 mg total) by mouth 3 (three) times daily. for 10 days     HYDROcodone-acetaminophen  mg per tablet  Commonly known as:  NORCO     isosorbide mononitrate 30 MG 24 hr tablet  Commonly known as:  IMDUR  Take 1 tablet (30 mg total) by mouth once daily.     liraglutide 0.6 mg/0.1 mL (18 mg/3 mL) subq PNIJ 0.6 mg/0.1 mL (18 mg/3 mL) Pnij  Commonly known as:  VICTOZA 2-KOKI  Inject 1.8 mg into the skin once daily.     metoprolol  "tartrate 25 MG tablet  Commonly known as:  LOPRESSOR  Take 1 tablet (25 mg total) by mouth 2 (two) times daily.     mupirocin calcium 2% 2 % cream  Commonly known as:  BACTROBAN  Apply topically 3 (three) times daily.     mycophenolate 500 mg Tab  Commonly known as:  CELLCEPT  TAKE ONE TABLET BY MOUTH TWICE DAILY     naloxone 4 mg/actuation Spry  Commonly known as:  NARCAN     ondansetron 4 MG Tbdl  Commonly known as:  ZOFRAN-ODT  Take 1 tablet (4 mg total) by mouth every 8 (eight) hours as needed.     pen needle, diabetic 31 gauge x 3/16" Ndle  Commonly known as:  SURE-FINE PEN NEEDLES  1 each by Misc.(Non-Drug; Combo Route) route 4 (four) times daily with meals and nightly.     predniSONE 5 MG tablet  Commonly known as:  DELTASONE  TAKE ONE TABLET BY MOUTH ONE TIME DAILY     sodium bicarbonate 650 MG tablet  Take 4 tablets (2,600 mg total) by mouth 2 (two) times daily.     tacrolimus 0.5 MG Cap  Commonly known as:  PROGRAF  TAKE THREE CAPSULES BY MOUTH EVERY 12 HOURS     TRESIBA FLEXTOUCH U-100 100 unit/mL (3 mL) Inpn  Generic drug:  insulin degludec           Where to Get Your Medications      These medications were sent to [id number] - [place] - [id number] [facility]  [address] [facility] [address]    Phone:  [phone]   · levoFLOXacin 500 MG tablet           Follow-up Information     Avel Estrada MD. Call today.    Specialty:  Nephrology  Why:  Can return to the ER, If symptoms worsen  Contact information:  [address]   [place] [id number]  [phone]                       Scribe Attestation:   Scribe #1: I performed the above scribed service and the documentation accurately describes the services I performed. I attest to the accuracy of the note.     Attending:   Physician Attestation Statement for Scribe #1: I, Jackie Oleary MD, personally performed the services described in this documentation, as scribed by Paulie Up, in my presence, and it is both accurate " and complete.           Clinical Impression       ICD-10-CM ICD-9-CM   1. Cancer of skin, squamous cell C44.92 173.92   2. Urinary tract infection without hematuria, site unspecified N39.0 599.0       Disposition:   Disposition: Discharged  Condition: Stable         Jackie Oleary MD  09/19/19 0152

## 2019-09-19 NOTE — ASSESSMENT & PLAN NOTE
Kidney function is stable with a creatinine of 1.5.  Will continue Prograf at 3 mg twice a day.  Daily Prograf level while inpatient.

## 2019-09-19 NOTE — CONSULTS
[facility] -   Nephrology  Consult Note  Nephrology Telemedicine Consult Note    Consultation started: 9/18/2019 at 9:10 PM   The chief complaint leading to consultation is:  Kidney transplant, bleeding mass in the left scalp  This consultation was requested by: Dr. ANNALISE Oleary  The patient location is: [facility] Emergency Department  The patient arrived at: [facility] 10  Spoke nurse at bedside with patient assisting consultant. Also present with the patient at the time of the consultation:family member and Emergency room staff    The attending portion of this evaluation, treatment, and documentation was performed per Avel Estrada MD via audiovisual.         Patient Name: Lavelle Ladd  MRN: [id number]  Admission Date: 9/18/2019  Hospital Length of Stay: 0 days  Attending Provider: Jackie Oleary MD   Primary Care Physician: Rishabh Esteban MD  Principal Problem:<principal problem not specified>    Consults  Subjective:     HPI: Patient is a 64-year-old male with history of kidney transplant in 2016.  Patient had 1 episode of acute rejection in 2016 with the kidney responding to adequate treatment.  Since then patient has had no rejection.        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    Patient comes to the emergency room with recurrent bleeding and pus drainage from the exfoliating mass on the left side of the scalp.  He had a urine drawn earlier today which had greater than 100 cells but it was not a clean catch urine.  Patient denies any fevers and chills but has poor appetite and has been running low blood pressure today.  Patient seen and evaluated in the emergency room using telemedicine protocol.           Past Medical History:   Diagnosis Date    DINORAH (acute kidney injury)  2016    Arthritis     CAD in native artery 2019    CHF (congestive heart failure)     Chronic obstructive pulmonary disease 2016    Coronary artery disease involving native coronary artery of native heart without angina pectoris 2016    CRI (chronic renal insufficiency) 2019     donor kidney transplant for DM 16     Induction with Thymo x3 and IV solumedrol to total 875mg  Kidney Biopsy  2016: 16 glomeruli, ACR type 1 AVR type 2, significant microcirculatory changes, c4d negative, No DSA, 5 to10% fibrosis. Treated with thymo x8 2016- no rejection      Diastolic heart failure     Encounter for blood transfusion     ESRD on RRT since 10/2013 10/29/2013    Biopsy proven diabetic nephropathy and lymphoplasmacytic interstitial infiltrate not c/w with AIN (ddx sjogrens or assoc with tamm-horsefall protein extravasation)     GERD (gastroesophageal reflux disease)     History of hepatitis C, s/p successful Rx w/ SVR12 - 2017    Completed 12 weeks harvoni w/ SVR    Hyperlipidemia     Hypertension     PAD (peripheral artery disease) 2019    PIC line (peripherally inserted central catheter) flush     Prophylactic immunotherapy     Proteinuria     PVD (peripheral vascular disease) 2017    RLE BKA CT 16 Extensive atherosclerotic disease of the aorta and mesenteric arteries.     Renal hypertension     Type 2 diabetes mellitus with diabetic neuropathy, with long-term current use of insulin 2016    Vitamin B12 deficiency        Past Surgical History:   Procedure Laterality Date    AMPUTATION, FOOT, TRANSMETATARSAL Left 2018    Performed by [facility]la Wheeler D[facility] at [facility] OR    AMPUTATION, FOOT, TRANSMETATARSAL Left 10/31/2018    Performed by Karla Wheeler DPM at [facility] OR    AMPUTATION, FOOT, TRANSMETATARSAL Left 2018    Performed by Karla Wheeler DPM at [facility] OR    av bovine graft      Left UE    AV FISTULA  PLACEMENT      left UE    BIOPSY Tranplanted Kidney N/A 8/15/2016    Performed by Paulette Hanna MD at [facility] OR 2ND FLR    BIOPSY, LIVER, TRANSJUGULAR APPROACH N/A 4/24/2014    Performed by [facility] Diagnostic Provider at [facility] CATH LAB    CARDIAC CATHETERIZATION  02/2015    CATHETERIZATION, HEART, LEFT Left 7/21/2019    Performed by Andrew Valdez MD at [facility] CATH LAB    CLOSURE, WOUND Left 11/5/2018    Performed by Karla Wheeler DPM at [facility] OR    CLOSURE, WOUND Left 9/24/2018    Performed by Karla Wheeler DPM at [facility] OR    DEBRIDEMENT, METATARSAL BONE, 2 OR MORE BONES Left 11/5/2018    Performed by Karla Wheeler DPM at [facility] OR    INSERTION, CATHETER, VASCULAR N/A 10/31/2013    Performed by Ralph Mckeon MD at [facility] CATH LAB    IRRIGATION AND DEBRIDEMENT Left 7/9/2018    Performed by Katerian Magaña DPM at [facility] OR    IRRIGATION AND DEBRIDEMENT, LOWER EXTREMITY Left 10/31/2018    Performed by Karla Wheeler DPM at [facility] OR    KIDNEY TRANSPLANT  05/21/2016    LEFT LEG ANGIOGRAM N/A 6/14/2018    Performed by Donal Mcdonald MD at [facility] CATH LAB    LEG AMPUTATION THROUGH KNEE  2011    right LE, started as nail puncture leading to diabetic ulcer    LENGTHENING, TENDON, ACHILLES Left 9/24/2018    Performed by Karla Wheeler DPM at [facility] OR    TRANSPLANT-KIDNEY Right 5/21/2016    Performed by Ronny Bergeron MD at [facility] OR 2ND FLR       Review of patient's allergies indicates:  No Known Allergies  No current facility-administered medications for this encounter.      Current Outpatient Medications   Medication    amLODIPine (NORVASC) 10 MG tablet    aspirin (ECOTRIN) 81 MG EC tablet    atorvastatin (LIPITOR) 80 MG tablet    BD INSULIN SYRINGE ULTRA-FINE 1 mL 31 gauge x 5/16 Syrg    blood sugar diagnostic Strp    blood-glucose meter kit    clopidogrel (PLAVIX) 75 mg tablet    diclofenac (FLECTOR) 1.3 % PT12    ergocalciferol (ERGOCALCIFEROL) 50,000 unit Cap    flash glucose  "scanning reader (FREESTYLE BRYAN 14 DAY READER) Misc    flash glucose sensor (FREESTYLE BRYAN 14 DAY SENSOR) Kit    gabapentin (NEURONTIN) 300 MG capsule    HYDROcodone-acetaminophen (NORCO)  mg per tablet    isosorbide mononitrate (IMDUR) 30 MG 24 hr tablet    liraglutide 0.6 mg/0.1 mL, 18 mg/3 mL, subq PNIJ (VICTOZA 2-KOKI) 0.6 mg/0.1 mL (18 mg/3 mL) PnIj    metoprolol tartrate (LOPRESSOR) 25 MG tablet    mupirocin calcium 2% (BACTROBAN) 2 % cream    mycophenolate (CELLCEPT) 500 mg Tab    naloxone (NARCAN) 4 mg/actuation Spry    ondansetron (ZOFRAN-ODT) 4 MG TbDL    pen needle, diabetic (SURE-FINE PEN NEEDLES) 31 gauge x 3/16" Ndle    predniSONE (DELTASONE) 5 MG tablet    sodium bicarbonate 650 MG tablet    tacrolimus (PROGRAF) 0.5 MG Cap    TRESIBA FLEXTOUCH U-100 100 unit/mL (3 mL) InPn     Family History     Problem Relation (Age of Onset)    Cancer Father    Diabetes Father    Heart failure Father, Mother    Stroke Father        Tobacco Use    Smoking status: Former Smoker     Packs/day: 1.00     Years: 40.00     Pack years: 40.00     Last attempt to quit: 2013     Years since quittin.6    Smokeless tobacco: Never Used   Substance and Sexual Activity    Alcohol use: Yes     Alcohol/week: 3.6 oz     Types: 6 Cans of beer per week     Comment: seldom    Drug use: No    Sexual activity: Never     Review of Systems   Constitutional: Positive for activity change, appetite change and fatigue. Negative for chills, diaphoresis and fever.   HENT: Negative.  Negative for congestion and trouble swallowing.    Eyes: Negative.    Respiratory: Negative.  Negative for cough, chest tightness, shortness of breath and wheezing.    Cardiovascular: Negative.  Negative for chest pain, palpitations and leg swelling.   Gastrointestinal: Negative.  Negative for abdominal distention, abdominal pain, nausea and vomiting.   Genitourinary: Negative.  Negative for decreased urine volume, difficulty " urinating, dysuria, enuresis, flank pain, frequency, hematuria, penile swelling, scrotal swelling and urgency.   Musculoskeletal: Negative.  Negative for arthralgias, back pain, joint swelling and neck pain.   Skin: Negative for rash.        Exfoliating mass which is bleeding and has been draining pus on the left side of the frontal scalp see picture   Neurological: Negative.  Negative for tremors, seizures and headaches.   Psychiatric/Behavioral: Negative.  Negative for confusion and sleep disturbance. The patient is not nervous/anxious.    All other systems reviewed and are negative.    Objective:     Vital Signs (Most Recent):  Temp: 97.6 °F (36.4 °C) (09/18/19 2034)  Pulse: 90 (09/18/19 2034)  Resp: 18 (09/18/19 2034)  BP: (!) 87/65 (09/18/19 2034)  SpO2: 97 % (09/18/19 2034)  O2 Device (Oxygen Therapy): room air (09/18/19 2034) Vital Signs (24h Range):  Temp:  [97.6 °F (36.4 °C)] 97.6 °F (36.4 °C)  Pulse:  [90] 90  Resp:  [18] 18  SpO2:  [97 %] 97 %  BP: (87)/(65) 87/65     Weight: 98.4 kg (217 lb) (09/18/19 2034)  Body mass index is 30.27 kg/m².  Body surface area is 2.22 meters squared.    No intake/output data recorded.    Physical Exam   Constitutional: He is oriented to person, place, and time. He appears well-developed and well-nourished. No distress.   HENT:   Head: Normocephalic and atraumatic.   Eyes: Pupils are equal, round, and reactive to light.   Neck: Normal range of motion. Neck supple. No tracheal deviation present. No thyromegaly present.   Cardiovascular: Normal rate, regular rhythm, normal heart sounds and intact distal pulses. Exam reveals no gallop and no friction rub.   No murmur heard.  Pulmonary/Chest: Effort normal and breath sounds normal. He has no wheezes. He has no rales.   Abdominal: Soft. He exhibits no mass. There is no tenderness. There is no rebound and no guarding.   No allograft tenderness    Musculoskeletal: Normal range of motion. He exhibits no edema.   S/p right BKA   s/p  left transmetatarsal amputation   Lymphadenopathy:     He has no cervical adenopathy.   Neurological: He is alert and oriented to person, place, and time.   Skin: Skin is warm. Capillary refill takes less than 2 seconds. No rash noted. He is not diaphoretic. No erythema.   Left-sided scalp showing large tumor exfoliating covered by dressing   Psychiatric: He has a normal mood and affect. His behavior is normal. Judgment and thought content normal.   Nursing note and vitals reviewed.      Significant Labs:  All labs within the past 24 hours have been reviewed.    Significant Imaging:      Assessment/Plan:     Hypotension due to hypovolemia  Although objectively there is no weight loss in the last 2 weeks.  But the blood pressures running extremely low in the ER will give IV fluids and placed in observation.    Squamous cell carcinoma of skin of face  Recurrent bleeding and pus drainage.  Consult surgery.  Check procalcitonin.    Type 2 diabetes mellitus with retinopathy, with long-term current use of insulin  Continue home medication with sliding scale of insulin     donor kidney transplant for DM 16  Kidney function is stable with a creatinine of 1.5.  Will continue Prograf at 3 mg twice a day.  Daily Prograf level while inpatient.        Thank you for your consult.     Avel Estrada MD   Nephrology  [facility] -

## 2019-09-21 LAB — BACTERIA UR CULT: ABNORMAL

## 2019-09-23 ENCOUNTER — OFFICE VISIT (OUTPATIENT)
Dept: TRANSPLANT | Facility: CLINIC | Age: 64
End: 2019-09-23
Payer: MEDICARE

## 2019-09-23 VITALS
DIASTOLIC BLOOD PRESSURE: 76 MMHG | RESPIRATION RATE: 16 BRPM | BODY MASS INDEX: 29.91 KG/M2 | HEART RATE: 85 BPM | OXYGEN SATURATION: 99 % | HEIGHT: 71 IN | TEMPERATURE: 98 F | SYSTOLIC BLOOD PRESSURE: 146 MMHG | WEIGHT: 213.63 LBS

## 2019-09-23 DIAGNOSIS — N18.30 CHRONIC KIDNEY DISEASE (CKD), STAGE III (MODERATE): Primary | ICD-10-CM

## 2019-09-23 DIAGNOSIS — Z94.0 KIDNEY REPLACED BY TRANSPLANT: ICD-10-CM

## 2019-09-23 PROCEDURE — 99499 RISK ADDL DX/OHS AUDIT: ICD-10-PCS | Mod: HCNC,S$GLB,, | Performed by: INTERNAL MEDICINE

## 2019-09-23 PROCEDURE — 3078F DIAST BP <80 MM HG: CPT | Mod: HCNC,CPTII,S$GLB, | Performed by: INTERNAL MEDICINE

## 2019-09-23 PROCEDURE — 3078F PR MOST RECENT DIASTOLIC BLOOD PRESSURE < 80 MM HG: ICD-10-PCS | Mod: HCNC,CPTII,S$GLB, | Performed by: INTERNAL MEDICINE

## 2019-09-23 PROCEDURE — 3077F SYST BP >= 140 MM HG: CPT | Mod: HCNC,CPTII,S$GLB, | Performed by: INTERNAL MEDICINE

## 2019-09-23 PROCEDURE — 99999 PR PBB SHADOW E&M-EST. PATIENT-LVL III: CPT | Mod: PBBFAC,HCNC,, | Performed by: INTERNAL MEDICINE

## 2019-09-23 PROCEDURE — 1101F PT FALLS ASSESS-DOCD LE1/YR: CPT | Mod: HCNC,CPTII,S$GLB, | Performed by: INTERNAL MEDICINE

## 2019-09-23 PROCEDURE — 99499 UNLISTED E&M SERVICE: CPT | Mod: HCNC,S$GLB,, | Performed by: INTERNAL MEDICINE

## 2019-09-23 PROCEDURE — 3077F PR MOST RECENT SYSTOLIC BLOOD PRESSURE >= 140 MM HG: ICD-10-PCS | Mod: HCNC,CPTII,S$GLB, | Performed by: INTERNAL MEDICINE

## 2019-09-23 PROCEDURE — 99999 PR PBB SHADOW E&M-EST. PATIENT-LVL III: ICD-10-PCS | Mod: PBBFAC,HCNC,, | Performed by: INTERNAL MEDICINE

## 2019-09-23 PROCEDURE — 99215 OFFICE O/P EST HI 40 MIN: CPT | Mod: HCNC,S$GLB,, | Performed by: INTERNAL MEDICINE

## 2019-09-23 PROCEDURE — 99215 PR OFFICE/OUTPT VISIT, EST, LEVL V, 40-54 MIN: ICD-10-PCS | Mod: HCNC,S$GLB,, | Performed by: INTERNAL MEDICINE

## 2019-09-23 PROCEDURE — 1101F PR PT FALLS ASSESS DOC 0-1 FALLS W/OUT INJ PAST YR: ICD-10-PCS | Mod: HCNC,CPTII,S$GLB, | Performed by: INTERNAL MEDICINE

## 2019-09-23 RX ORDER — TACROLIMUS 0.5 MG/1
1.5 CAPSULE ORAL EVERY 12 HOURS
Qty: 180 CAPSULE | Refills: 11 | Status: SHIPPED | OUTPATIENT
Start: 2019-09-23 | End: 2020-06-30

## 2019-09-23 RX ORDER — PREDNISONE 5 MG/1
5 TABLET ORAL DAILY
Qty: 30 TABLET | Refills: 11 | Status: SHIPPED | OUTPATIENT
Start: 2019-09-23 | End: 2020-11-23

## 2019-09-23 RX ORDER — TACROLIMUS 0.5 MG/1
CAPSULE ORAL
Qty: 180 CAPSULE | Refills: 0 | OUTPATIENT
Start: 2019-09-23

## 2019-09-23 NOTE — LETTER
September 23, 2019        Avel Estrada  [address]  [place] [address]  Phone: [phone]  Fax: [phone]             [facility]  [address]  [facility] [address]  Phone: [phone]   Patient: Lavelle Ladd   MR Number: [id number]   YOB: 1955   Date of Visit: 9/23/2019       Dear Dr. Avel Estrada    Thank you for referring Lavelle Ladd to me for evaluation. Attached you will find relevant portions of my assessment and plan of care.    If you have questions, please do not hesitate to call me. I look forward to following Lavelle Ladd along with you.    Sincerely,    Tiana Blanco MD    Enclosure    If you would like to receive this communication electronically, please contact [email or web address] or [phone] to request [employer] access.    [employer] is a tool which provides read-only access to select patient information with whom you have a relationship. Its easy to use and provides real time access to review your patients record including encounter summaries, notes, results, and demographic information.    If you feel you have received this communication in error or would no longer like to receive these types of communications, please e-mail [email or web address]

## 2019-09-23 NOTE — PATIENT INSTRUCTIONS
From Dr. Apple:  It is my privilege to participate in your post-transplant care!    -STOP Cellcept. We want to reduce immunosuppression given the aggressive cancer.  -Plan repeat lab in a month  -Call your cardiologist regarding concern of arm pain/numbness. I doubt it is related to your heart, but since you are concerned, ask!    Please be sure to let us know if you have any questions or concerns about your health care - we cannot help you if we do not know.  Don't forget we are on call 24/7 for any emergencies.   Best Wishes,  Dr. Ambika Blanco

## 2019-09-23 NOTE — PROGRESS NOTES
NOAM met with pt alone in clinic.  He was seated in a w/c wearing a prosthetic leg and holding cane.  Pt is 36 months from transplant and is here for routine visit.  Pt states he is living alone and  receives support from sister.  He states he is using [facility] and is affording all of hismedications.  SW provided info on resources for DME (he is interested in new wheelchair). No further needs identified at this time.  NOAM remains available.

## 2019-09-23 NOTE — PROGRESS NOTES
Post-Transplant Assessment    Referring Physician: Avel Estrada  Current Nephrologist: Avel Estrada    ORGAN: RIGHT KIDNEY  Donor Type: donation after brain death  PHS Increased Risk: yes  Cold Ischemia: 1,088 mins  Induction Medications: thymoglobulin    Subjective:     CC:  Reassessment of renal allograft function and management of chronic immunosuppression.    HPI:  Mr. Ladd is a 64 y.o. year old White male who received a donation after brain death kidney transplant on 5/21/16.  He has CKD stage 3 - GFR 30-59 and his baseline creatinine is between 1.7-1.9. He takes mycophenolate mofetil, prednisone and tacrolimus for maintenance immunosuppression.      Pertinent History:  ESRD from DM, on hemodialysis started on 10/2013  Organ: SCD, increased PHS risk donor, 54% KDPI  partial dehiscence. 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 Thymo x8 doses (3 half doses, others full dose)  6/21/2016 f/u Biopsy - no rejection   +orthostatic hypotension in hospital, d/c'd on midodrine (d/c'd 7/13/16)  8/15/16: suspicious for ACR and AMR, no AVR. Rx- SMx3 8/19-21 Non HLA Ab neg  11/30 biopsy with ACR s/p SM pulse and PLEX x3; IVIG in BR  Dec 2016 + SOB and dound to have RUL cavitary lung lesion, found to have kleb pneumo growing from BAL and Enterobacter septicemia now s/p over 4 weeks of IV ertapenem  7/21/19 with acute anterolateral and inferior STEMI. Cath showed occluded apical LAD, diffuse non obstructive CAD elsewhereas a mass left temple rapidly growing  08/15/2019  Left temple squamous cell carcinoma    He is in need of preop clearance for planned SCC removal.  Biopsy showed: SQUAMOUS CELL CARCINOMA, EXTENDING TO THE PERIPHERAL AND DEEP BIOPSY EDGES  Having pain in stump. Denies sores.  Following with  "Cardiology after recent MI.  He reports some left arm numbness with occasional pain, and knows someone who  of an MI after similar symptoms.  He was encouraged to call his cardiologist today.   He denies any kidney-related complaints, such as pain over allograft, flank pain, dysuria, frequency, or other LUTS.   He notes difficulty with balance especially after MI, but denies falls.  He is relying more on a wheelchair.     Review of Systems   Respiratory: Negative for shortness of breath.    Cardiovascular: Negative for chest pain and leg swelling.   Gastrointestinal: Negative for abdominal pain, nausea and vomiting.   Genitourinary: Negative for difficulty urinating.   Skin: Positive for wound (Left temple SCC). Negative for rash.   Allergic/Immunologic: Positive for immunocompromised state.       Objective:   Blood pressure (!) 146/76, pulse 85, temperature 97.9 °F (36.6 °C), temperature source Oral, resp. rate 16, height 5' 11" (1.803 m), weight 96.9 kg (213 lb 10 oz), SpO2 99 %.body mass index is 29.79 kg/m².    Physical Exam   Constitutional: He is oriented to person, place, and time. He appears well-developed and well-nourished.   HENT:   Head: Normocephalic.   Eyes: Conjunctivae are normal.   Cardiovascular: Normal rate and regular rhythm.   Pulmonary/Chest: Effort normal and breath sounds normal.   Abdominal: Soft. He exhibits no mass. There is no tenderness.   Musculoskeletal: He exhibits no edema.   Neurological: He is alert and oriented to person, place, and time.   Skin:   Left temple SCC is a large raised lesion measuring several cm in diameter without obvious ulcerations.   Psychiatric: He has a normal mood and affect. Judgment normal.     Labs:  Recent Labs   Lab 10/30/17  0710  18  0727  18  0441  19  1027 19  1045  19  0515  19  1240 19  1403 19  1038 19  0721 19  0740 19  2113   WBC  --    < >  --    < >  --    < >  --  4.81   < > " 12.35   < > 11.14  --   --   --  8.34 8.12   Hemoglobin  --    < >  --    < >  --    < >  --  15.7   < > 15.3   < > 12.5 L  --   --   --  14.1 13.8 L   Hematocrit  --    < >  --    < >  --    < >  --  49.8   < > 45.4   < > 38.2 L  --   --   --  46.5 42.1   Sodium  --    < >  --    < >  --    < >  --  140   < > 130 L  130 L   < > 135 L 134 L  --   --  136 135 L   Potassium  --    < >  --    < >  --    < >  --  4.3   < > 4.8  4.8   < > 4.2 5.0  --   --  4.1 4.1   Chloride  --    < >  --    < >  --    < >  --  105   < > 104  104   < > 104 105  --   --  103 102   CO2  --    < >  --    < >  --    < >  --  25   < > 14 L  14 L   < > 20 L 15 L  --   --  23 20 L   BUN, Bld  --    < >  --    < >  --    < >  --  19   < > 22  22   < > 14 14  --   --  15 18   Creatinine  --    < >  --    < >  --    < >  --  1.6 H   < > 1.4  1.4   < > 1.7 H 1.7 H 1.5 H  --  1.5 H 1.7 H   eGFR if non   --    < >  --    < >  --    < >  --  44.5 A   < > 52 A  52 A   < > 41 A 41 A 48 A  --  47.8 A 41 A   Calcium  --    < >  --    < >  --    < >  --  9.9   < > 9.0  9.0   < > 8.8 9.5  --   --  9.3 9.2   Phosphorus  --    < >  --    < >  --    < >  --  2.9  --  2.8  --   --   --   --   --  2.5 L  --    Magnesium  --    < >  --    < >  --    < >  --  1.9  --  1.7  --   --   --   --   --  1.7  --    Albumin  --    < >  --    < >  --    < >  --  3.5  3.5   < > 2.6 L   < > 2.5 L  --   --   --  3.2 L  3.2 L 3.0 L   AST  --    < >  --    < >  --    < >  --  9 L   < > 22   < > 14  --   --   --  11 8 L   ALT  --    < >  --    < >  --    < >  --  12   < > 11   < > 12  --   --   --  12 12   Prot/Creat Ratio, Ur  --    < > 0.28 H  --   --   --  0.81 H  --   --   --   --   --   --   --  0.59 H  --   --    PTH, Intact 232.0 H  --   --   --   --   --   --   --   --   --   --   --   --   --   --   --   --    Tacrolimus Lvl 24.1 H   < >  --    < > 5.6  --   --  5.3  --   --   --   --   --   --   --  7.1  --     < > = values in this interval  not displayed.         Labs were reviewed with the patient.    Assessment:     1. Chronic kidney disease (CKD), stage III (moderate)    2. Kidney replaced by transplant        Plan:     Allograft function assessment with CKD III   -allograft function stable, CKD 3. Management of kidney disease and related issues (HTN, anemia, SPTH, metabolic bone disease) should continue by community nephrologist.   Lab Results   Component Value Date    CREATININE 1.7 (H) 09/18/2019     Left temple Squamous cell Carcinoma,  in need of surgery: Stable medically from renal perspective for planned procedure.    Immunosuppression evaluation  -We discussed his immunosuppression is likely contributing to the rapid growth of his skin cancer.  His CellCept was stopped today.  -Repeat Immuknow cylex in 1 month to see if further reduction of immunosuppression is feasible without jeopardizing the allograft.    Evaluation for bone marrow suppression (r/t immunosuppression toxicity or infection)  -Stable  Lab Results   Component Value Date    WBC 8.12 09/18/2019    HGB 13.8 (L) 09/18/2019    HCT 42.1 09/18/2019     09/18/2019     Metabolic bone disease [Secondary hyperparathyroidism, Phosphorus metabolism disorders]    Lab Results   Component Value Date    .0 (H) 10/30/2017    CALCIUM 9.2 09/18/2019    CAION 1.10 05/30/2016    PHOS 2.5 (L) 09/18/2019   -All acceptable    Assessment of electrolytes  - metabolic acidosis:  Continue sodium bicarb.  I clarified with him that the sodium bicarb is for correction of acid buildup in his blood and not for acid indigestion.  Lab Results   Component Value Date     (L) 09/18/2019    K 4.1 09/18/2019     09/18/2019    CO2 20 (L) 09/18/2019       Screening for BK infection to prevent allograft dysfunction  Lab Results   Component Value Date    BKVIRUSPCRQB <125 09/18/2019   -Negative as desired   Continue blood PCR screening as per program guidelines to prevent BK viremia and  nephropathy leading to allograft dysfunction and potential graft failure    Screening for proteinuria  Lab Results   Component Value Date    PROTEINURINE 30 (H) 09/18/2019    CREATRANDUR 51.0 09/18/2019    UTPCR 0.59 (H) 09/18/2019   - Negligible, as desired. Continue monitoring as per program guidelines      Follow-up:   He noted he is having transportation issues given his recent health changes and BKA.  I suggested he can continue to get most of his care in [place] as long as no txp related issues develop that cannot be handled locally.    Annual follow-up with kidney transplant clinic with repeat labs, including renal function panel with UA, urine protein/creatinine ratio, and drug trough level q3 months.  Patient also reminded to follow-up with general nephrologist.    Labs: since patient remains at high risk for rejection and drug-related complications that warrant close monitoring, labs will be ordered as follows: continue twice weekly CBC, renal panel, and drug level for first month; then same labs once weekly through 3rd month post-transplant.  Urine for UA and protein/creatinine ratio monthly.  Serum BK - PCR at 1-, 3-, 6-, 9-, 12-, 18-, 24-, 36-, 48-, and 60 months post-transplant.  Hepatic panel at 1-, 2-, 3-, 6-, 9-, 12-, 18-, 24-, and 36- months post-transplant.    MD YAEL Wang Patient Status  Functional Status: 50% - Requires considerable assistance and frequent medical care  Physical Capacity: Limited Mobility

## 2019-09-26 ENCOUNTER — TELEPHONE (OUTPATIENT)
Dept: OTOLARYNGOLOGY | Facility: CLINIC | Age: 64
End: 2019-09-26

## 2019-09-27 ENCOUNTER — HOSPITAL ENCOUNTER (OUTPATIENT)
Facility: HOSPITAL | Age: 64
Discharge: HOME | End: 2019-09-30
Attending: OTOLARYNGOLOGY | Admitting: OTOLARYNGOLOGY
Payer: MEDICARE

## 2019-09-27 ENCOUNTER — ANESTHESIA (OUTPATIENT)
Dept: SURGERY | Facility: HOSPITAL | Age: 64
End: 2019-09-27
Payer: MEDICARE

## 2019-09-27 DIAGNOSIS — M79.602 LEFT ARM PAIN: ICD-10-CM

## 2019-09-27 DIAGNOSIS — C44.42 SQUAMOUS CELL CARCINOMA, SCALP/NECK: Primary | ICD-10-CM

## 2019-09-27 LAB
POCT GLUCOSE: 101 MG/DL (ref 70–110)
POCT GLUCOSE: 101 MG/DL (ref 70–110)
POCT GLUCOSE: 83 MG/DL (ref 70–110)

## 2019-09-27 PROCEDURE — 27201423 OPTIME MED/SURG SUP & DEVICES STERILE SUPPLY: Mod: HCNC | Performed by: OTOLARYNGOLOGY

## 2019-09-27 PROCEDURE — 63600175 PHARM REV CODE 636 W HCPCS: Mod: HCNC | Performed by: NURSE ANESTHETIST, CERTIFIED REGISTERED

## 2019-09-27 PROCEDURE — 88305 TISSUE EXAM BY PATHOLOGIST: CPT | Mod: 26,HCNC,, | Performed by: PATHOLOGY

## 2019-09-27 PROCEDURE — 14301 PR ADJ TISS XFER ANY AREA,30.1-60 SQCM: ICD-10-PCS | Mod: HCNC,,, | Performed by: OTOLARYNGOLOGY

## 2019-09-27 PROCEDURE — 25000003 PHARM REV CODE 250: Mod: HCNC | Performed by: STUDENT IN AN ORGANIZED HEALTH CARE EDUCATION/TRAINING PROGRAM

## 2019-09-27 PROCEDURE — 25000003 PHARM REV CODE 250: Mod: HCNC | Performed by: OTOLARYNGOLOGY

## 2019-09-27 PROCEDURE — 63600175 PHARM REV CODE 636 W HCPCS: Mod: HCNC | Performed by: ANESTHESIOLOGY

## 2019-09-27 PROCEDURE — 88305 TISSUE EXAM BY PATHOLOGIST: CPT | Mod: HCNC | Performed by: PATHOLOGY

## 2019-09-27 PROCEDURE — D9220A PRA ANESTHESIA: ICD-10-PCS | Mod: HCNC,,, | Performed by: ANESTHESIOLOGY

## 2019-09-27 PROCEDURE — 14302 PR ADJ TISS XFER ANY AREA,EA ADD 30.0 SQCM: ICD-10-PCS | Mod: HCNC,,, | Performed by: OTOLARYNGOLOGY

## 2019-09-27 PROCEDURE — 94761 N-INVAS EAR/PLS OXIMETRY MLT: CPT | Mod: HCNC

## 2019-09-27 PROCEDURE — 63600175 PHARM REV CODE 636 W HCPCS: Mod: HCNC | Performed by: STUDENT IN AN ORGANIZED HEALTH CARE EDUCATION/TRAINING PROGRAM

## 2019-09-27 PROCEDURE — 17999 UNLISTD PX SKN MUC MEMB SUBQ: CPT | Mod: HCNC,,, | Performed by: OTOLARYNGOLOGY

## 2019-09-27 PROCEDURE — 37000009 HC ANESTHESIA EA ADD 15 MINS: Mod: HCNC | Performed by: OTOLARYNGOLOGY

## 2019-09-27 PROCEDURE — 36000706: Mod: HCNC | Performed by: OTOLARYNGOLOGY

## 2019-09-27 PROCEDURE — 82962 GLUCOSE BLOOD TEST: CPT | Mod: HCNC | Performed by: OTOLARYNGOLOGY

## 2019-09-27 PROCEDURE — 71000039 HC RECOVERY, EACH ADD'L HOUR: Mod: HCNC | Performed by: OTOLARYNGOLOGY

## 2019-09-27 PROCEDURE — 14302 TIS TRNFR ADDL 30 SQ CM: CPT | Mod: HCNC,,, | Performed by: OTOLARYNGOLOGY

## 2019-09-27 PROCEDURE — 17999 PR ACELL DERM MATRIX IMPLT OTHER THAN BREAST/TRK: ICD-10-PCS | Mod: HCNC,,, | Performed by: OTOLARYNGOLOGY

## 2019-09-27 PROCEDURE — 25000003 PHARM REV CODE 250: Mod: HCNC

## 2019-09-27 PROCEDURE — 14301 TIS TRNFR ANY 30.1-60 SQ CM: CPT | Mod: HCNC,,, | Performed by: OTOLARYNGOLOGY

## 2019-09-27 PROCEDURE — 88305 TISSUE SPECIMEN TO PATHOLOGY - SURGERY: ICD-10-PCS | Mod: 26,HCNC,, | Performed by: PATHOLOGY

## 2019-09-27 PROCEDURE — 36000707: Mod: HCNC | Performed by: OTOLARYNGOLOGY

## 2019-09-27 PROCEDURE — 37000008 HC ANESTHESIA 1ST 15 MINUTES: Mod: HCNC | Performed by: OTOLARYNGOLOGY

## 2019-09-27 PROCEDURE — 71000033 HC RECOVERY, INTIAL HOUR: Mod: HCNC | Performed by: OTOLARYNGOLOGY

## 2019-09-27 PROCEDURE — D9220A PRA ANESTHESIA: Mod: HCNC,,, | Performed by: ANESTHESIOLOGY

## 2019-09-27 DEVICE — IMPLANTABLE DEVICE: Type: IMPLANTABLE DEVICE | Site: TEMPLE | Status: FUNCTIONAL

## 2019-09-27 RX ORDER — ERGOCALCIFEROL 1.25 MG/1
50000 CAPSULE ORAL
Status: DISCONTINUED | OUTPATIENT
Start: 2019-09-28 | End: 2019-09-30 | Stop reason: HOSPADM

## 2019-09-27 RX ORDER — LIDOCAINE HYDROCHLORIDE AND EPINEPHRINE 10; 10 MG/ML; UG/ML
INJECTION, SOLUTION INFILTRATION; PERINEURAL
Status: DISCONTINUED | OUTPATIENT
Start: 2019-09-27 | End: 2019-09-27 | Stop reason: HOSPADM

## 2019-09-27 RX ORDER — ATORVASTATIN CALCIUM 20 MG/1
80 TABLET, FILM COATED ORAL DAILY
Status: DISCONTINUED | OUTPATIENT
Start: 2019-09-28 | End: 2019-09-30 | Stop reason: HOSPADM

## 2019-09-27 RX ORDER — LIDOCAINE HCL/PF 100 MG/5ML
SYRINGE (ML) INTRAVENOUS
Status: DISCONTINUED | OUTPATIENT
Start: 2019-09-27 | End: 2019-09-27

## 2019-09-27 RX ORDER — HYDROCODONE BITARTRATE AND ACETAMINOPHEN 10; 325 MG/1; MG/1
TABLET ORAL
Status: DISPENSED
Start: 2019-09-27 | End: 2019-09-28

## 2019-09-27 RX ORDER — SODIUM CHLORIDE 0.9 % (FLUSH) 0.9 %
10 SYRINGE (ML) INJECTION
Status: DISCONTINUED | OUTPATIENT
Start: 2019-09-27 | End: 2019-09-27

## 2019-09-27 RX ORDER — PHENYLEPHRINE HYDROCHLORIDE 10 MG/ML
INJECTION INTRAVENOUS
Status: DISCONTINUED | OUTPATIENT
Start: 2019-09-27 | End: 2019-09-27

## 2019-09-27 RX ORDER — HYDROCODONE BITARTRATE AND ACETAMINOPHEN 10; 325 MG/1; MG/1
1 TABLET ORAL EVERY 6 HOURS PRN
Qty: 15 TABLET | Refills: 0 | Status: ON HOLD | OUTPATIENT
Start: 2019-09-27 | End: 2022-01-01 | Stop reason: HOSPADM

## 2019-09-27 RX ORDER — METOPROLOL TARTRATE 25 MG/1
25 TABLET, FILM COATED ORAL 2 TIMES DAILY
Status: DISCONTINUED | OUTPATIENT
Start: 2019-09-27 | End: 2019-09-30 | Stop reason: HOSPADM

## 2019-09-27 RX ORDER — SUCCINYLCHOLINE CHLORIDE 20 MG/ML
INJECTION INTRAMUSCULAR; INTRAVENOUS
Status: DISCONTINUED | OUTPATIENT
Start: 2019-09-27 | End: 2019-09-27

## 2019-09-27 RX ORDER — ISOSORBIDE MONONITRATE 30 MG/1
30 TABLET, EXTENDED RELEASE ORAL DAILY
Status: DISCONTINUED | OUTPATIENT
Start: 2019-09-27 | End: 2019-09-30 | Stop reason: HOSPADM

## 2019-09-27 RX ORDER — HYDROCODONE BITARTRATE AND ACETAMINOPHEN 5; 325 MG/1; MG/1
1 TABLET ORAL EVERY 4 HOURS PRN
Status: DISCONTINUED | OUTPATIENT
Start: 2019-09-27 | End: 2019-09-27

## 2019-09-27 RX ORDER — SODIUM BICARBONATE 650 MG/1
650 TABLET ORAL 2 TIMES DAILY
Status: DISCONTINUED | OUTPATIENT
Start: 2019-09-27 | End: 2019-09-30 | Stop reason: HOSPADM

## 2019-09-27 RX ORDER — FENTANYL CITRATE 50 UG/ML
25 INJECTION, SOLUTION INTRAMUSCULAR; INTRAVENOUS EVERY 5 MIN PRN
Status: COMPLETED | OUTPATIENT
Start: 2019-09-27 | End: 2019-09-27

## 2019-09-27 RX ORDER — SODIUM CHLORIDE 9 MG/ML
INJECTION, SOLUTION INTRAVENOUS CONTINUOUS
Status: DISCONTINUED | OUTPATIENT
Start: 2019-09-27 | End: 2019-09-28

## 2019-09-27 RX ORDER — CLOPIDOGREL BISULFATE 75 MG/1
75 TABLET ORAL DAILY
Status: DISCONTINUED | OUTPATIENT
Start: 2019-09-27 | End: 2019-09-30 | Stop reason: HOSPADM

## 2019-09-27 RX ORDER — HYDROCODONE BITARTRATE AND ACETAMINOPHEN 10; 325 MG/1; MG/1
TABLET ORAL
Status: COMPLETED
Start: 2019-09-27 | End: 2019-09-27

## 2019-09-27 RX ORDER — CEPHALEXIN 500 MG/1
500 CAPSULE ORAL EVERY 6 HOURS
Status: DISCONTINUED | OUTPATIENT
Start: 2019-09-27 | End: 2019-09-30 | Stop reason: HOSPADM

## 2019-09-27 RX ORDER — FENTANYL CITRATE 50 UG/ML
INJECTION, SOLUTION INTRAMUSCULAR; INTRAVENOUS
Status: DISCONTINUED | OUTPATIENT
Start: 2019-09-27 | End: 2019-09-27

## 2019-09-27 RX ORDER — MORPHINE SULFATE 2 MG/ML
2 INJECTION, SOLUTION INTRAMUSCULAR; INTRAVENOUS EVERY 4 HOURS PRN
Status: DISCONTINUED | OUTPATIENT
Start: 2019-09-27 | End: 2019-09-29

## 2019-09-27 RX ORDER — PROPOFOL 10 MG/ML
VIAL (ML) INTRAVENOUS
Status: DISCONTINUED | OUTPATIENT
Start: 2019-09-27 | End: 2019-09-27

## 2019-09-27 RX ORDER — PROPOFOL 10 MG/ML
VIAL (ML) INTRAVENOUS CONTINUOUS PRN
Status: DISCONTINUED | OUTPATIENT
Start: 2019-09-27 | End: 2019-09-27

## 2019-09-27 RX ORDER — HYDROCODONE BITARTRATE AND ACETAMINOPHEN 10; 325 MG/1; MG/1
1 TABLET ORAL EVERY 6 HOURS PRN
Status: DISCONTINUED | OUTPATIENT
Start: 2019-09-27 | End: 2019-09-27

## 2019-09-27 RX ORDER — CEFAZOLIN SODIUM 1 G/3ML
2 INJECTION, POWDER, FOR SOLUTION INTRAMUSCULAR; INTRAVENOUS
Status: COMPLETED | OUTPATIENT
Start: 2019-09-27 | End: 2019-09-27

## 2019-09-27 RX ORDER — ASPIRIN 81 MG/1
81 TABLET ORAL DAILY
Status: DISCONTINUED | OUTPATIENT
Start: 2019-09-27 | End: 2019-09-30 | Stop reason: HOSPADM

## 2019-09-27 RX ORDER — AMLODIPINE BESYLATE 10 MG/1
10 TABLET ORAL DAILY
Status: DISCONTINUED | OUTPATIENT
Start: 2019-09-27 | End: 2019-09-30 | Stop reason: HOSPADM

## 2019-09-27 RX ORDER — HYDROCODONE BITARTRATE AND ACETAMINOPHEN 10; 325 MG/1; MG/1
1 TABLET ORAL EVERY 4 HOURS PRN
Status: DISCONTINUED | OUTPATIENT
Start: 2019-09-27 | End: 2019-09-29

## 2019-09-27 RX ORDER — TACROLIMUS 0.5 MG/1
0.5 CAPSULE ORAL 2 TIMES DAILY
Status: DISCONTINUED | OUTPATIENT
Start: 2019-09-27 | End: 2019-09-30 | Stop reason: HOSPADM

## 2019-09-27 RX ORDER — LIDOCAINE HYDROCHLORIDE 10 MG/ML
1 INJECTION, SOLUTION EPIDURAL; INFILTRATION; INTRACAUDAL; PERINEURAL ONCE
Status: DISCONTINUED | OUTPATIENT
Start: 2019-09-27 | End: 2019-09-27

## 2019-09-27 RX ORDER — PREDNISONE 5 MG/1
5 TABLET ORAL DAILY
Status: DISCONTINUED | OUTPATIENT
Start: 2019-09-27 | End: 2019-09-30 | Stop reason: HOSPADM

## 2019-09-27 RX ORDER — METOCLOPRAMIDE HYDROCHLORIDE 5 MG/ML
5 INJECTION INTRAMUSCULAR; INTRAVENOUS EVERY 6 HOURS PRN
Status: DISCONTINUED | OUTPATIENT
Start: 2019-09-27 | End: 2019-09-30 | Stop reason: HOSPADM

## 2019-09-27 RX ORDER — ONDANSETRON 8 MG/1
8 TABLET, ORALLY DISINTEGRATING ORAL EVERY 8 HOURS PRN
Status: DISCONTINUED | OUTPATIENT
Start: 2019-09-27 | End: 2019-09-30 | Stop reason: HOSPADM

## 2019-09-27 RX ORDER — MORPHINE SULFATE 2 MG/ML
INJECTION, SOLUTION INTRAMUSCULAR; INTRAVENOUS
Status: DISPENSED
Start: 2019-09-27 | End: 2019-09-28

## 2019-09-27 RX ORDER — BACITRACIN 500 [USP'U]/G
OINTMENT TOPICAL EVERY 12 HOURS
Status: DISCONTINUED | OUTPATIENT
Start: 2019-09-27 | End: 2019-09-30 | Stop reason: HOSPADM

## 2019-09-27 RX ORDER — MIDAZOLAM HYDROCHLORIDE 1 MG/ML
INJECTION, SOLUTION INTRAMUSCULAR; INTRAVENOUS
Status: DISCONTINUED | OUTPATIENT
Start: 2019-09-27 | End: 2019-09-27

## 2019-09-27 RX ORDER — CEPHALEXIN 250 MG/1
250 CAPSULE ORAL EVERY 8 HOURS
Qty: 21 CAPSULE | Refills: 0 | Status: ON HOLD | OUTPATIENT
Start: 2019-09-27 | End: 2019-10-08 | Stop reason: HOSPADM

## 2019-09-27 RX ORDER — GABAPENTIN 300 MG/1
300 CAPSULE ORAL 3 TIMES DAILY
Status: DISCONTINUED | OUTPATIENT
Start: 2019-09-27 | End: 2019-09-30 | Stop reason: HOSPADM

## 2019-09-27 RX ORDER — SODIUM CHLORIDE 9 MG/ML
INJECTION, SOLUTION INTRAVENOUS CONTINUOUS PRN
Status: DISCONTINUED | OUTPATIENT
Start: 2019-09-27 | End: 2019-09-27

## 2019-09-27 RX ADMIN — HYDROCODONE BITARTRATE AND ACETAMINOPHEN 1 TABLET: 10; 325 TABLET ORAL at 02:09

## 2019-09-27 RX ADMIN — METOPROLOL TARTRATE 25 MG: 25 TABLET, FILM COATED ORAL at 09:09

## 2019-09-27 RX ADMIN — PHENYLEPHRINE HYDROCHLORIDE 100 MCG: 10 INJECTION INTRAVENOUS at 01:09

## 2019-09-27 RX ADMIN — CLOPIDOGREL BISULFATE 75 MG: 75 TABLET, FILM COATED ORAL at 03:09

## 2019-09-27 RX ADMIN — TACROLIMUS 0.5 MG: 0.5 CAPSULE ORAL at 05:09

## 2019-09-27 RX ADMIN — FENTANYL CITRATE 25 MCG: 50 INJECTION, SOLUTION INTRAMUSCULAR; INTRAVENOUS at 12:09

## 2019-09-27 RX ADMIN — GABAPENTIN 300 MG: 300 CAPSULE ORAL at 03:09

## 2019-09-27 RX ADMIN — MIDAZOLAM HYDROCHLORIDE 1 MG: 1 INJECTION, SOLUTION INTRAMUSCULAR; INTRAVENOUS at 12:09

## 2019-09-27 RX ADMIN — FENTANYL CITRATE 50 MCG: 50 INJECTION, SOLUTION INTRAMUSCULAR; INTRAVENOUS at 12:09

## 2019-09-27 RX ADMIN — FENTANYL CITRATE 25 MCG: 50 INJECTION INTRAMUSCULAR; INTRAVENOUS at 05:09

## 2019-09-27 RX ADMIN — AMLODIPINE BESYLATE 10 MG: 10 TABLET ORAL at 03:09

## 2019-09-27 RX ADMIN — PROPOFOL 140 MG: 10 INJECTION, EMULSION INTRAVENOUS at 12:09

## 2019-09-27 RX ADMIN — ISOSORBIDE MONONITRATE 30 MG: 30 TABLET, EXTENDED RELEASE ORAL at 04:09

## 2019-09-27 RX ADMIN — CEFAZOLIN 2 G: 330 INJECTION, POWDER, FOR SOLUTION INTRAMUSCULAR; INTRAVENOUS at 12:09

## 2019-09-27 RX ADMIN — Medication: at 08:09

## 2019-09-27 RX ADMIN — PREDNISONE 5 MG: 5 TABLET ORAL at 03:09

## 2019-09-27 RX ADMIN — GABAPENTIN 300 MG: 300 CAPSULE ORAL at 08:09

## 2019-09-27 RX ADMIN — PROPOFOL 30 MG: 10 INJECTION, EMULSION INTRAVENOUS at 12:09

## 2019-09-27 RX ADMIN — MORPHINE SULFATE 2 MG: 2 INJECTION, SOLUTION INTRAMUSCULAR; INTRAVENOUS at 07:09

## 2019-09-27 RX ADMIN — LIDOCAINE HYDROCHLORIDE 60 MG: 20 INJECTION, SOLUTION INTRAVENOUS at 12:09

## 2019-09-27 RX ADMIN — ASPIRIN 81 MG: 81 TABLET, COATED ORAL at 03:09

## 2019-09-27 RX ADMIN — SUCCINYLCHOLINE CHLORIDE 120 MG: 20 INJECTION, SOLUTION INTRAMUSCULAR; INTRAVENOUS at 12:09

## 2019-09-27 RX ADMIN — SODIUM BICARBONATE 650 MG TABLET 650 MG: at 08:09

## 2019-09-27 RX ADMIN — SODIUM CHLORIDE: 0.9 INJECTION, SOLUTION INTRAVENOUS at 11:09

## 2019-09-27 RX ADMIN — PROPOFOL 20 MCG/KG/MIN: 10 INJECTION, EMULSION INTRAVENOUS at 12:09

## 2019-09-27 RX ADMIN — HYDROCODONE BITARTRATE AND ACETAMINOPHEN 1 TABLET: 10; 325 TABLET ORAL at 07:09

## 2019-09-27 NOTE — TRANSFER OF CARE
"Anesthesia Transfer of Care Note    Patient: Lavelle Ladd    Procedure(s) Performed: Procedure(s) (LRB):  EXCISION, SKIN (Left)    Patient location: PACU    Anesthesia Type: general    Transport from OR: Transported from OR on room air with adequate spontaneous ventilation    Post pain: adequate analgesia    Post assessment: no apparent anesthetic complications and tolerated procedure well    Post vital signs: stable    Level of consciousness: awake and alert    Nausea/Vomiting: no nausea/vomiting    Complications: none          Last vitals:   Visit Vitals  /79 (BP Location: Right arm, Patient Position: Lying)   Pulse 80   Temp 36.3 °C (97.3 °F) (Temporal)   Resp 16   Ht 5' 11" (1.803 m)   Wt 97.1 kg (214 lb)   SpO2 97%   BMI 29.85 kg/m²     "

## 2019-09-27 NOTE — BRIEF OP NOTE
[facility]  Brief Operative Note    SUMMARY     Surgery Date: 9/27/2019     Surgeon(s) and Role:     * Lenard Alarcon MD - Primary     * Ned Ng MD - Resident - Assisting        Pre-op Diagnosis:  Malignant neoplasm of skin of scalp [C44.40]    Post-op Diagnosis:  Post-Op Diagnosis Codes:     * Malignant neoplasm of skin of scalp [C44.40]    Procedure(s) (LRB):  EXCISION, SKIN (Left)    Anesthesia: General    Description of Procedure: WLE left temporal scalp. Placed of aCell ECM matrix. Placement of bolser.     Description of the findings of the procedure: See op note    Estimated Blood Loss: Less than 20cc         Specimens:   Specimen (12h ago, onward)     Start     Ordered    09/27/19 1305  Specimen to Pathology - Surgery  Once     Comments:  Pre-op Diagnosis: Malignant neoplasm of skin of scalp [C44.40] Procedure(s):EXCISION, SKIN Number of specimens: 1Name of specimens: 1.) Left temporal scalp lesion. Short suture 12:00, long suture 3:00. Permanent      09/27/19 1306

## 2019-09-27 NOTE — PLAN OF CARE
Patient prepared for procedure.  Patient states plan is to stay overnight and does not have ride home today.  Dr. Ng made aware.

## 2019-09-28 LAB
POCT GLUCOSE: 203 MG/DL (ref 70–110)
POCT GLUCOSE: 256 MG/DL (ref 70–110)
POCT GLUCOSE: 296 MG/DL (ref 70–110)
TROPONIN I SERPL DL<=0.01 NG/ML-MCNC: <0.006 NG/ML (ref 0–0.03)

## 2019-09-28 PROCEDURE — 63600175 PHARM REV CODE 636 W HCPCS: Mod: HCNC | Performed by: STUDENT IN AN ORGANIZED HEALTH CARE EDUCATION/TRAINING PROGRAM

## 2019-09-28 PROCEDURE — 93010 ELECTROCARDIOGRAM REPORT: CPT | Mod: HCNC,,, | Performed by: INTERNAL MEDICINE

## 2019-09-28 PROCEDURE — 25000003 PHARM REV CODE 250: Mod: HCNC | Performed by: STUDENT IN AN ORGANIZED HEALTH CARE EDUCATION/TRAINING PROGRAM

## 2019-09-28 PROCEDURE — 93010 EKG 12-LEAD: ICD-10-PCS | Mod: HCNC,,, | Performed by: INTERNAL MEDICINE

## 2019-09-28 PROCEDURE — 93005 ELECTROCARDIOGRAM TRACING: CPT | Mod: HCNC

## 2019-09-28 PROCEDURE — 84484 ASSAY OF TROPONIN QUANT: CPT | Mod: HCNC

## 2019-09-28 PROCEDURE — 36415 COLL VENOUS BLD VENIPUNCTURE: CPT | Mod: HCNC

## 2019-09-28 RX ORDER — IBUPROFEN 200 MG
24 TABLET ORAL
Status: DISCONTINUED | OUTPATIENT
Start: 2019-09-28 | End: 2019-09-30 | Stop reason: HOSPADM

## 2019-09-28 RX ORDER — IBUPROFEN 200 MG
16 TABLET ORAL
Status: DISCONTINUED | OUTPATIENT
Start: 2019-09-28 | End: 2019-09-30 | Stop reason: HOSPADM

## 2019-09-28 RX ORDER — GLUCAGON 1 MG
1 KIT INJECTION
Status: DISCONTINUED | OUTPATIENT
Start: 2019-09-28 | End: 2019-09-30 | Stop reason: HOSPADM

## 2019-09-28 RX ORDER — INSULIN ASPART 100 [IU]/ML
0-5 INJECTION, SOLUTION INTRAVENOUS; SUBCUTANEOUS
Status: DISCONTINUED | OUTPATIENT
Start: 2019-09-28 | End: 2019-09-30 | Stop reason: HOSPADM

## 2019-09-28 RX ORDER — INSULIN ASPART 100 [IU]/ML
0-5 INJECTION, SOLUTION INTRAVENOUS; SUBCUTANEOUS
Status: DISCONTINUED | OUTPATIENT
Start: 2019-09-28 | End: 2019-09-28

## 2019-09-28 RX ADMIN — AMLODIPINE BESYLATE 10 MG: 10 TABLET ORAL at 09:09

## 2019-09-28 RX ADMIN — TACROLIMUS 0.5 MG: 0.5 CAPSULE ORAL at 06:09

## 2019-09-28 RX ADMIN — HYDROCODONE BITARTRATE AND ACETAMINOPHEN 1 TABLET: 10; 325 TABLET ORAL at 12:09

## 2019-09-28 RX ADMIN — ATORVASTATIN CALCIUM 80 MG: 20 TABLET, FILM COATED ORAL at 09:09

## 2019-09-28 RX ADMIN — Medication: at 09:09

## 2019-09-28 RX ADMIN — GABAPENTIN 300 MG: 300 CAPSULE ORAL at 08:09

## 2019-09-28 RX ADMIN — SODIUM CHLORIDE: 0.9 INJECTION, SOLUTION INTRAVENOUS at 09:09

## 2019-09-28 RX ADMIN — PREDNISONE 5 MG: 5 TABLET ORAL at 09:09

## 2019-09-28 RX ADMIN — HYDROCODONE BITARTRATE AND ACETAMINOPHEN 1 TABLET: 10; 325 TABLET ORAL at 10:09

## 2019-09-28 RX ADMIN — MORPHINE SULFATE 2 MG: 2 INJECTION, SOLUTION INTRAMUSCULAR; INTRAVENOUS at 12:09

## 2019-09-28 RX ADMIN — CEPHALEXIN 500 MG: 500 CAPSULE ORAL at 11:09

## 2019-09-28 RX ADMIN — CLOPIDOGREL BISULFATE 75 MG: 75 TABLET, FILM COATED ORAL at 09:09

## 2019-09-28 RX ADMIN — SODIUM BICARBONATE 650 MG TABLET 650 MG: at 09:09

## 2019-09-28 RX ADMIN — GABAPENTIN 300 MG: 300 CAPSULE ORAL at 09:09

## 2019-09-28 RX ADMIN — MORPHINE SULFATE 2 MG: 2 INJECTION, SOLUTION INTRAMUSCULAR; INTRAVENOUS at 03:09

## 2019-09-28 RX ADMIN — INSULIN ASPART 2 UNITS: 100 INJECTION, SOLUTION INTRAVENOUS; SUBCUTANEOUS at 05:09

## 2019-09-28 RX ADMIN — METOPROLOL TARTRATE 25 MG: 25 TABLET, FILM COATED ORAL at 09:09

## 2019-09-28 RX ADMIN — HYDROCODONE BITARTRATE AND ACETAMINOPHEN 1 TABLET: 10; 325 TABLET ORAL at 04:09

## 2019-09-28 RX ADMIN — TACROLIMUS 0.5 MG: 0.5 CAPSULE ORAL at 09:09

## 2019-09-28 RX ADMIN — MORPHINE SULFATE 2 MG: 2 INJECTION, SOLUTION INTRAMUSCULAR; INTRAVENOUS at 08:09

## 2019-09-28 RX ADMIN — GABAPENTIN 300 MG: 300 CAPSULE ORAL at 02:09

## 2019-09-28 RX ADMIN — ERGOCALCIFEROL 50000 UNITS: 1.25 CAPSULE ORAL at 09:09

## 2019-09-28 RX ADMIN — INSULIN ASPART 3 UNITS: 100 INJECTION, SOLUTION INTRAVENOUS; SUBCUTANEOUS at 11:09

## 2019-09-28 RX ADMIN — ISOSORBIDE MONONITRATE 30 MG: 30 TABLET, EXTENDED RELEASE ORAL at 09:09

## 2019-09-28 RX ADMIN — METOPROLOL TARTRATE 25 MG: 25 TABLET, FILM COATED ORAL at 08:09

## 2019-09-28 RX ADMIN — SODIUM BICARBONATE 650 MG TABLET 650 MG: at 08:09

## 2019-09-28 RX ADMIN — HYDROCODONE BITARTRATE AND ACETAMINOPHEN 1 TABLET: 10; 325 TABLET ORAL at 06:09

## 2019-09-28 RX ADMIN — CEPHALEXIN 500 MG: 500 CAPSULE ORAL at 06:09

## 2019-09-28 RX ADMIN — ASPIRIN 81 MG: 81 TABLET, COATED ORAL at 09:09

## 2019-09-28 RX ADMIN — HYDROCODONE BITARTRATE AND ACETAMINOPHEN 1 TABLET: 10; 325 TABLET ORAL at 02:09

## 2019-09-28 RX ADMIN — CEPHALEXIN 500 MG: 500 CAPSULE ORAL at 12:09

## 2019-09-28 RX ADMIN — Medication: at 08:09

## 2019-09-28 RX ADMIN — CEPHALEXIN 500 MG: 500 CAPSULE ORAL at 04:09

## 2019-09-28 NOTE — ASSESSMENT & PLAN NOTE
64M with history of kidney transplant, recent MI (1 month ago), and chronic pain related to amputation/back who is POD#1 s/p WLE of left temple SCC with Acell placement and bolster application. Significant pain overnight.     -continue hospital stay   -continue home medications   -Keflex while bolster in place  -pain control - norco10 & morphine PRN breakthrough pain   -diabetic diet   -plan for second stage reconstruction with skin graft after negative margins are confirmed   -dispo: pending pain control, potentially Sunday

## 2019-09-28 NOTE — PROGRESS NOTES
[facility]  Otorhinolaryngology-Head & Neck Surgery  Progress Note    Subjective:     Post-Op Info:  Procedure(s) (LRB):  EXCISION, SKIN (Left)   1 Day Post-Op  Hospital Day: 2     Interval History: NAEON. Patient reports he had significant pain yesterday evening after surgery. He is worried about going home with uncontrolled pain as he has chronic pain after multiple back surgeries and leg amputation. Otherwise, he is doing well. Tolerating PO.     Medications:  Continuous Infusions:   sodium chloride 0.9% 50 mL/hr at 09/28/19 0956     Scheduled Meds:   amLODIPine  10 mg Oral Daily    aspirin  81 mg Oral Daily    atorvastatin  80 mg Oral Daily    bacitracin   Topical (Top) Q12H    cephALEXin  500 mg Oral Q6H    clopidogrel  75 mg Oral Daily    ergocalciferol  50,000 Units Oral Q7 Days    gabapentin  300 mg Oral TID    isosorbide mononitrate  30 mg Oral Daily    metoprolol tartrate  25 mg Oral BID    predniSONE  5 mg Oral Daily    sodium bicarbonate  650 mg Oral BID    tacrolimus  0.5 mg Oral BID     PRN Meds:Dextrose 10% Bolus, Dextrose 10% Bolus, glucagon (human recombinant), glucose, glucose, HYDROcodone-acetaminophen, metoclopramide HCl, morphine, ondansetron     Review of patient's allergies indicates:  No Known Allergies  Objective:     Vital Signs (24h Range):  Temp:  [96 °F (35.6 °C)-98.6 °F (37 °C)] 97.6 °F (36.4 °C)  Pulse:  [75-80] 79  Resp:  [7-18] 18  SpO2:  [94 %-99 %] 97 %  BP: (113-162)/(70-95) 136/76     Date 09/28/19 0700 - 09/29/19 0659   Shift 1698-5799 2052-2338 9709-7574 24 Hour Total   INTAKE   Shift Total(mL/kg)       OUTPUT   Urine(mL/kg/hr) 450   450   Shift Total(mL/kg) 450(4.6)   450(4.6)   Weight (kg) 97.1 97.1 97.1 97.1     Lines/Drains/Airways     Peripheral Intravenous Line                 Peripheral IV - Single Lumen 09/27/19 1116 20 G Anterior;Right Forearm less than 1 day                Physical Exam   NAD, alert, awake  Voice clear   Bolster  dressing in place over left temple, clean and dry   Normal external nose, MMM  Normal work of breathing, no stridor/stertor  AKA of right leg     Significant Labs:  None    Significant Diagnostics:  None    Assessment/Plan:     * Squamous cell carcinoma, scalp/neck  64M with history of kidney transplant, recent MI (1 month ago), and chronic pain related to amputation/back who is POD#1 s/p WLE of left temple SCC with Acell placement and bolster application. Significant pain overnight.     -continue hospital stay   -continue home medications   -Keflex while bolster in place  -pain control - norco10 & morphine PRN breakthrough pain   -diabetic diet   -plan for second stage reconstruction with skin graft after negative margins are confirmed   -dispo: pending pain control, potentially Sunday         Geeta Leahy MD  Otorhinolaryngology-Head & Neck Surgery  [facility]

## 2019-09-28 NOTE — PROGRESS NOTES
Accucheck glocose reading 81. Patient c/o feeling hungry. Patient eating Turkey sandwich and apple juice. Medicated for patient for pain.

## 2019-09-28 NOTE — NURSING TRANSFER
Nursing Transfer Note      9/27/2019     Transfer to room 511A  Transfer via stretcher    Transfer with artificial leg(R lower leg prosthesis) walking cane, blue belongings bag, clear belongings bag with clothes, cell phone.      Transported by Patient Escort    Medicines sent:     Chart send with patient: yes      Notified: sister

## 2019-09-29 LAB
POCT GLUCOSE: 210 MG/DL (ref 70–110)
POCT GLUCOSE: 219 MG/DL (ref 70–110)
POCT GLUCOSE: 227 MG/DL (ref 70–110)
POCT GLUCOSE: 364 MG/DL (ref 70–110)

## 2019-09-29 PROCEDURE — 63600175 PHARM REV CODE 636 W HCPCS: Mod: HCNC | Performed by: STUDENT IN AN ORGANIZED HEALTH CARE EDUCATION/TRAINING PROGRAM

## 2019-09-29 PROCEDURE — 90471 IMMUNIZATION ADMIN: CPT | Mod: HCNC | Performed by: OTOLARYNGOLOGY

## 2019-09-29 PROCEDURE — 63600175 PHARM REV CODE 636 W HCPCS: Mod: HCNC | Performed by: OTOLARYNGOLOGY

## 2019-09-29 PROCEDURE — 90662 IIV NO PRSV INCREASED AG IM: CPT | Mod: HCNC | Performed by: OTOLARYNGOLOGY

## 2019-09-29 PROCEDURE — G0008 ADMIN INFLUENZA VIRUS VAC: HCPCS | Mod: HCNC | Performed by: OTOLARYNGOLOGY

## 2019-09-29 PROCEDURE — 25000003 PHARM REV CODE 250: Mod: HCNC | Performed by: STUDENT IN AN ORGANIZED HEALTH CARE EDUCATION/TRAINING PROGRAM

## 2019-09-29 RX ORDER — OXYCODONE AND ACETAMINOPHEN 10; 325 MG/1; MG/1
1 TABLET ORAL EVERY 4 HOURS PRN
Status: DISCONTINUED | OUTPATIENT
Start: 2019-09-29 | End: 2019-09-30 | Stop reason: HOSPADM

## 2019-09-29 RX ORDER — MORPHINE SULFATE 2 MG/ML
3 INJECTION, SOLUTION INTRAMUSCULAR; INTRAVENOUS EVERY 4 HOURS PRN
Status: DISCONTINUED | OUTPATIENT
Start: 2019-09-29 | End: 2019-09-30 | Stop reason: HOSPADM

## 2019-09-29 RX ADMIN — CEPHALEXIN 500 MG: 500 CAPSULE ORAL at 05:09

## 2019-09-29 RX ADMIN — ASPIRIN 81 MG: 81 TABLET, COATED ORAL at 09:09

## 2019-09-29 RX ADMIN — TACROLIMUS 0.5 MG: 0.5 CAPSULE ORAL at 09:09

## 2019-09-29 RX ADMIN — INFLUENZA A VIRUS A/MICHIGAN/45/2015 X-275 (H1N1) ANTIGEN (FORMALDEHYDE INACTIVATED), INFLUENZA A VIRUS A/SINGAPORE/INFIMH-16-0019/2016 IVR-186 (H3N2) ANTIGEN (FORMALDEHYDE INACTIVATED), AND INFLUENZA B VIRUS B/MARYLAND/15/2016 BX-69A (A B/COLORADO/6/2017-LIKE VIRUS) ANTIGEN (FORMALDEHYDE INACTIVATED) 0.5 ML: 60; 60; 60 INJECTION, SUSPENSION INTRAMUSCULAR at 06:09

## 2019-09-29 RX ADMIN — TACROLIMUS 0.5 MG: 0.5 CAPSULE ORAL at 05:09

## 2019-09-29 RX ADMIN — INSULIN ASPART 2 UNITS: 100 INJECTION, SOLUTION INTRAVENOUS; SUBCUTANEOUS at 12:09

## 2019-09-29 RX ADMIN — METOPROLOL TARTRATE 25 MG: 25 TABLET, FILM COATED ORAL at 09:09

## 2019-09-29 RX ADMIN — HYDROCODONE BITARTRATE AND ACETAMINOPHEN 1 TABLET: 10; 325 TABLET ORAL at 02:09

## 2019-09-29 RX ADMIN — SODIUM BICARBONATE 650 MG TABLET 650 MG: at 09:09

## 2019-09-29 RX ADMIN — AMLODIPINE BESYLATE 10 MG: 10 TABLET ORAL at 09:09

## 2019-09-29 RX ADMIN — ATORVASTATIN CALCIUM 80 MG: 20 TABLET, FILM COATED ORAL at 09:09

## 2019-09-29 RX ADMIN — GABAPENTIN 300 MG: 300 CAPSULE ORAL at 09:09

## 2019-09-29 RX ADMIN — HYDROCODONE BITARTRATE AND ACETAMINOPHEN 1 TABLET: 10; 325 TABLET ORAL at 07:09

## 2019-09-29 RX ADMIN — CLOPIDOGREL BISULFATE 75 MG: 75 TABLET, FILM COATED ORAL at 09:09

## 2019-09-29 RX ADMIN — MORPHINE SULFATE 3 MG: 2 INJECTION, SOLUTION INTRAMUSCULAR; INTRAVENOUS at 11:09

## 2019-09-29 RX ADMIN — INSULIN ASPART 2 UNITS: 100 INJECTION, SOLUTION INTRAVENOUS; SUBCUTANEOUS at 05:09

## 2019-09-29 RX ADMIN — OXYCODONE HYDROCHLORIDE AND ACETAMINOPHEN 1 TABLET: 10; 325 TABLET ORAL at 04:09

## 2019-09-29 RX ADMIN — PREDNISONE 5 MG: 5 TABLET ORAL at 09:09

## 2019-09-29 RX ADMIN — CEPHALEXIN 500 MG: 500 CAPSULE ORAL at 11:09

## 2019-09-29 RX ADMIN — ISOSORBIDE MONONITRATE 30 MG: 30 TABLET, EXTENDED RELEASE ORAL at 09:09

## 2019-09-29 RX ADMIN — MORPHINE SULFATE 3 MG: 2 INJECTION, SOLUTION INTRAMUSCULAR; INTRAVENOUS at 06:09

## 2019-09-29 RX ADMIN — OXYCODONE HYDROCHLORIDE AND ACETAMINOPHEN 1 TABLET: 10; 325 TABLET ORAL at 11:09

## 2019-09-29 RX ADMIN — MORPHINE SULFATE 2 MG: 2 INJECTION, SOLUTION INTRAMUSCULAR; INTRAVENOUS at 03:09

## 2019-09-29 RX ADMIN — OXYCODONE HYDROCHLORIDE AND ACETAMINOPHEN 1 TABLET: 10; 325 TABLET ORAL at 08:09

## 2019-09-29 RX ADMIN — Medication: at 09:09

## 2019-09-29 RX ADMIN — GABAPENTIN 300 MG: 300 CAPSULE ORAL at 03:09

## 2019-09-29 NOTE — ASSESSMENT & PLAN NOTE
64M with history of kidney transplant, recent MI (1 month ago), and chronic pain related to amputation/back who is POD#2 s/p WLE of left temple SCC with Acell placement and bolster application. Continues to have uncontrolled pain.    -continue hospital stay   -continue home medications   -Keflex while bolster in place  -pain control adjustments - percocet 10 & morphine PRN breakthrough pain   -may need pain management consult Monday  -diabetic diet   -plan for second stage reconstruction with skin graft after negative margins are confirmed   -dispo: pending pain control, potentially Monday. Will discuss with Dr. Alarcon tomorrow

## 2019-09-29 NOTE — PLAN OF CARE
Pt AAOx4 and VSS. Pt is progressing with plan of care. Free of skin breakdown as the pt positioned/repositioned well independently. Clean, dry, and intact dressing noted on L temporal area. Prosthetic leg at bedside. SCDs in place at all times. Incentive spirometer at bedside and pt instructed on its use. Pain meds given per MD order but c/o consistent back pain. Frequent rounds made to assess pain and safety and no complaints at this time noted. Side rails up x 2. Bed locked. Call light within reach. No falls noted. Will continue to monitor.

## 2019-09-29 NOTE — PROGRESS NOTES
[facility]  Otorhinolaryngology-Head & Neck Surgery  Progress Note    Subjective:     Post-Op Info:  Procedure(s) (LRB):  EXCISION, SKIN (Left)   2 Days Post-Op  Hospital Day: 3     Interval History: Complained of left arm pain yesterday and that he felt like he was having a heart attack. EKG obtained, troponin negative. Requested to use home diclofenac patch for chronic back pain, which helped. Still feels like pain is uncontrolled. Norco is like taking tick tacs. Does not feel like he can make it home to [place]. Concerned about the cosmetic appearance of the bolster dressing.     Medications:  Continuous Infusions:    Scheduled Meds:   amLODIPine  10 mg Oral Daily    aspirin  81 mg Oral Daily    atorvastatin  80 mg Oral Daily    bacitracin   Topical (Top) Q12H    cephALEXin  500 mg Oral Q6H    clopidogrel  75 mg Oral Daily    ergocalciferol  50,000 Units Oral Q7 Days    gabapentin  300 mg Oral TID    isosorbide mononitrate  30 mg Oral Daily    metoprolol tartrate  25 mg Oral BID    predniSONE  5 mg Oral Daily    sodium bicarbonate  650 mg Oral BID    tacrolimus  0.5 mg Oral BID     PRN Meds:Dextrose 10% Bolus, Dextrose 10% Bolus, glucagon (human recombinant), glucagon (human recombinant), glucose, glucose, glucose, glucose, insulin aspart U-100, metoclopramide HCl, morphine, ondansetron, oxyCODONE-acetaminophen     Review of patient's allergies indicates:  No Known Allergies  Objective:     Vital Signs (24h Range):  Temp:  [96.2 °F (35.7 °C)-98 °F (36.7 °C)] 97.3 °F (36.3 °C)  Pulse:  [68-80] 68  Resp:  [16-20] 16  SpO2:  [92 %-97 %] 97 %  BP: (116-191)/(56-94) 191/94       Lines/Drains/Airways     Peripheral Intravenous Line                 Peripheral IV - Single Lumen 09/27/19 1116 20 G Anterior;Right Forearm 1 day                Physical Exam     NAD, alert, awake  Voice clear   Bolster dressing in place over left temple, clean and dry   Normal external nose, MMM  Normal  work of breathing, no stridor/stertor  AKA of right leg     Significant Labs:  Troponin negative.    Significant Diagnostics:  EKG read pending    Assessment/Plan:     * Squamous cell carcinoma, scalp/neck  64M with history of kidney transplant, recent MI (1 month ago), and chronic pain related to amputation/back who is POD#2 s/p WLE of left temple SCC with Acell placement and bolster application. Continues to have uncontrolled pain.    -continue hospital stay   -continue home medications   -Keflex while bolster in place  -pain control adjustments - percocet 10 & morphine PRN breakthrough pain   -may need pain management consult Monday  -diabetic diet   -plan for second stage reconstruction with skin graft after negative margins are confirmed   -dispo: pending pain control, potentially Monday. Will discuss with Dr. Alarcon tomorrow        Geeta Leahy MD  Otorhinolaryngology-Head & Neck Surgery  [facility]

## 2019-09-29 NOTE — NURSING
Pt c/o chronic back pain and asked if he uses the prescribed patch, Flector. Called to on-call MD. Lam to use the patch per Dr. Leahy.

## 2019-09-30 ENCOUNTER — TELEPHONE (OUTPATIENT)
Dept: TRANSPLANT | Facility: HOSPITAL | Age: 64
End: 2019-09-30

## 2019-09-30 VITALS
HEART RATE: 66 BPM | DIASTOLIC BLOOD PRESSURE: 86 MMHG | HEIGHT: 71 IN | SYSTOLIC BLOOD PRESSURE: 149 MMHG | RESPIRATION RATE: 16 BRPM | BODY MASS INDEX: 29.96 KG/M2 | TEMPERATURE: 98 F | WEIGHT: 214 LBS | OXYGEN SATURATION: 97 %

## 2019-09-30 LAB — POCT GLUCOSE: 148 MG/DL (ref 70–110)

## 2019-09-30 PROCEDURE — G0378 HOSPITAL OBSERVATION PER HR: HCPCS | Mod: HCNC

## 2019-09-30 PROCEDURE — 63600175 PHARM REV CODE 636 W HCPCS: Mod: HCNC | Performed by: STUDENT IN AN ORGANIZED HEALTH CARE EDUCATION/TRAINING PROGRAM

## 2019-09-30 PROCEDURE — 25000003 PHARM REV CODE 250: Mod: HCNC | Performed by: STUDENT IN AN ORGANIZED HEALTH CARE EDUCATION/TRAINING PROGRAM

## 2019-09-30 RX ADMIN — CEPHALEXIN 500 MG: 500 CAPSULE ORAL at 06:09

## 2019-09-30 RX ADMIN — AMLODIPINE BESYLATE 10 MG: 10 TABLET ORAL at 08:09

## 2019-09-30 RX ADMIN — MORPHINE SULFATE 3 MG: 2 INJECTION, SOLUTION INTRAMUSCULAR; INTRAVENOUS at 06:09

## 2019-09-30 RX ADMIN — METOPROLOL TARTRATE 25 MG: 25 TABLET, FILM COATED ORAL at 08:09

## 2019-09-30 RX ADMIN — OXYCODONE HYDROCHLORIDE AND ACETAMINOPHEN 1 TABLET: 10; 325 TABLET ORAL at 12:09

## 2019-09-30 RX ADMIN — ISOSORBIDE MONONITRATE 30 MG: 30 TABLET, EXTENDED RELEASE ORAL at 08:09

## 2019-09-30 RX ADMIN — GABAPENTIN 300 MG: 300 CAPSULE ORAL at 08:09

## 2019-09-30 RX ADMIN — SODIUM BICARBONATE 650 MG TABLET 650 MG: at 08:09

## 2019-09-30 RX ADMIN — ATORVASTATIN CALCIUM 80 MG: 20 TABLET, FILM COATED ORAL at 09:09

## 2019-09-30 RX ADMIN — OXYCODONE HYDROCHLORIDE AND ACETAMINOPHEN 1 TABLET: 10; 325 TABLET ORAL at 04:09

## 2019-09-30 RX ADMIN — CEPHALEXIN 500 MG: 500 CAPSULE ORAL at 12:09

## 2019-09-30 RX ADMIN — ASPIRIN 81 MG: 81 TABLET, COATED ORAL at 08:09

## 2019-09-30 RX ADMIN — CLOPIDOGREL BISULFATE 75 MG: 75 TABLET, FILM COATED ORAL at 08:09

## 2019-09-30 RX ADMIN — PREDNISONE 5 MG: 5 TABLET ORAL at 08:09

## 2019-09-30 RX ADMIN — TACROLIMUS 0.5 MG: 0.5 CAPSULE ORAL at 10:09

## 2019-09-30 RX ADMIN — OXYCODONE HYDROCHLORIDE AND ACETAMINOPHEN 1 TABLET: 10; 325 TABLET ORAL at 08:09

## 2019-09-30 NOTE — PLAN OF CARE
Pt A & O x4, VS stable, dressing in place near L eye, pt positions independently, fall precautions in place, pain monitored and controlled with scheduled and PRN medication, no signs of infection, call light within reach, discharge teaching provided, IV removed, wheelchair called for transport.

## 2019-09-30 NOTE — TELEPHONE ENCOUNTER
ON CALL SOCIAL WORK NOTE:  On 9/28/19,  (SW) received a call from the patient (pt) to address concerns regarding medical equipment.  Pt advised he was a prior transplant recipient; however, had been hospitalized as an ortho pt.  Pt stated he was in need of a new wheelchair due to his current device being old and worn.  SW discussed protocol to the pt regarding advising pt's nurse of concerns in an attempt to reach the hospital SW staff.  SW also inquired about pt's current device with regards to insurance coverage, etc.  Pt was adamant about his nurse coordinator, Rosita WOOD, always assisting and that the SW team did not visit the room during this inpatient stay to assess needs.  SW provided active listening and appropriate education.  Pt able to verbalized understanding and denied any other concerns/issues.  SW remains available.

## 2019-09-30 NOTE — PLAN OF CARE
Patient is a 64 year old male admitted from home 9/27/2019 and underwent Wide Scalp Excision with bolster placement.  Patient upgraded to observation for pain control and is expected to discharge home today with no needs.     Patient in room alone, angry he can not drive himself home.  Informed patient he has had IV and PO pain medication and can not drive as long as he is on any pain medication.  Reinforced need for patient to not drive at home as long as he is on pain medication.  Called patient's sister to inquire if she will  patient.  Stated she will call patient and will  patient today and bring someone to drive his car home.  Patient lives alone in a one story home with no steps to enter.  Patient has right prosthetic leg and uses a straight cane for ambulation.  [facility] given to patient and/or family with understanding verbalized.  CM name and number written on white board in patient's room with request to call for any questions and concerns.  Called [facility] to ensure they will deliver patient's medications to bedside.  Gave patient's sister this CM's number to call when she is at [facility] and patient will be brought to front of hospital.  Informed patient's nurse his sister will be leaving [place] to pick him up, stated she will get wheelchair and put it in patient's room for his transportation downstairs.  Will continue to follow for needs.    PCP  Rishabh Esteban MD  [address] / [place] [address]  [phone]      [id number] - [place] - [address]  [address]  [place] [address]  Phone: [phone] Fax: [phone]    [id number] - [place], [place] - [address]  [address]  [place] [address]  Phone: [phone] Fax: [phone]    [facility] - [place], [place] - [address]  [address]  [facility] [address]  Phone:  [phone] Fax: [phone]    [facility] - [place], [place] - [address]  [address] [address]  Phone: [phone] Fax: [phone]      Extended Emergency Contact Information  Primary Emergency Contact: Kecia Sagastume   [place]  Mobile Phone: [phone]  Relation: Sister       09/30/19 1154   Discharge Assessment   Assessment Type Discharge Planning Assessment   Confirmed/corrected address and phone number on facesheet? Yes   Assessment information obtained from? Patient   Expected Length of Stay (days) 3   Communicated expected length of stay with patient/caregiver yes   Prior to hospitilization cognitive status: Alert/Oriented   Prior to hospitalization functional status: Independent;Assistive Equipment   Current cognitive status: Alert/Oriented   Current Functional Status: Independent;Assistive Equipment   Lives With alone   Able to Return to Prior Arrangements yes   Is patient able to care for self after discharge? Yes   Who are your caregiver(s) and their phone number(s)? Sister   Patient's perception of discharge disposition home or selfcare   Equipment Currently Used at Home cane, straight   Do you have any problems affording any of your prescribed medications? No   Is the patient taking medications as prescribed? yes   Does the patient have transportation home? Yes   Transportation Anticipated family or friend will provide   Discharge Plan A Home   Discharge Plan B Home with family   DME Needed Upon Discharge  none   Patient/Family in Agreement with Plan yes

## 2019-09-30 NOTE — PLAN OF CARE
Patient discharged home with no needs.  Patient scheduled for next surgery 10/7/2019 with Dr Alarcon.    Future Appointments   Date Time Provider Department Center   10/10/2019 10:00 AM Chapin Rodríguez II, MD [facility] RAD ONC [facility]   12/3/2019  2:15 PM Diego Hill MD [facility] DERM [facility]   12/11/2019  3:40 PM Michael Contreras MD [facility] CARDIO [facility]          09/30/19 1314   Final Note   Assessment Type Final Discharge Note   Anticipated Discharge Disposition Home   Right Care Referral Info   Post Acute Recommendation No Care

## 2019-09-30 NOTE — DISCHARGE SUMMARY
[facility]  Otorhinolaryngology-Head & Neck Surgery  Discharge Summary      Patient Name: Lavelle Ladd  MRN: [id number]  Admission Date: 9/27/2019  Hospital Length of Stay: 0 days  Discharge Date and Time:  09/30/2019 12:19 PM  Attending Physician: Lenard Alarcon MD   Discharging Provider: Geeta Leahy MD  Primary Care Provider: Rishabh Esteban MD     HPI: Mr. Ladd is a 65 y/o male with past medical history notable for kidney transplant, recent MI, chronic pain, and right AKA who originally presented to the ENT clinic for evaluation of a mass over the left temple that was thought to be consistent with squamous cell carcinoma.    Procedure(s) (LRB):  EXCISION, SKIN (Left)     Hospital Course: The patient was admitted after WLE of left temple SCC with Acell placement and bolster application. On POD#1 and #2, he felt that his pain was uncontrolled and did not feel comfortable going home. He otherwise did well. He tolerated PO. Today, on POD#3, he feels comfortable with discharge and is medically ready for discharge.     Physical Exam:   NAD, alert, awake  Voice clear   Bolster dressing in place over left temple, clean and dry   Normal external nose, MMM  Normal work of breathing, no stridor/stertor  AKA of right leg       Consults: None    Significant Diagnostic Studies: None    Pending Diagnostic Studies:     None        Final Active Diagnoses:    Diagnosis Date Noted POA    PRINCIPAL PROBLEM:  Squamous cell carcinoma, scalp/neck [C44.42] 09/27/2019 Yes      Problems Resolved During this Admission:      Discharged Condition: good    Disposition: Home or Self Care    Follow Up:  Follow-up Information     Lenard Alarcon MD On 10/7/2019.    Specialty:  Otolaryngology  Why:  For surgery   [facility] information:  [facility] [id number] [phone]                 Patient Instructions:      Diet Adult Regular     Notify your health care provider if you experience any of  the following:  temperature >100.4     Notify your health care provider if you experience any of the following:  persistent nausea and vomiting or diarrhea     Notify your health care provider if you experience any of the following:  severe uncontrolled pain     Notify your health care provider if you experience any of the following:  redness, tenderness, or signs of infection (pain, swelling, redness, odor or green/yellow discharge around incision site)     Notify your health care provider if you experience any of the following:  difficulty breathing or increased cough     Notify your health care provider if you experience any of the following:  severe persistent headache     Notify your health care provider if you experience any of the following:  worsening rash     Notify your health care provider if you experience any of the following:  persistent dizziness, light-headedness, or visual disturbances     Notify your health care provider if you experience any of the following:  increased confusion or weakness     No dressing needed   Order Comments: Keep bolster in place. Keep clean and dry. Take antibiotic as prescribed until follow up.     Activity as tolerated     Medications:  Reconciled Home Medications:      Medication List      START taking these medications    cephALEXin 250 MG capsule  Commonly known as:  KEFLEX  Take 1 capsule (250 mg total) by mouth every 8 (eight) hours. for 7 days        CHANGE how you take these medications    HYDROcodone-acetaminophen  mg per tablet  Commonly known as:  NORCO  1 tablet by Per G Tube route every 6 (six) hours as needed for Pain.  What changed:    · how much to take  · how to take this  · when to take this  · reasons to take this        CONTINUE taking these medications    amLODIPine 10 MG tablet  Commonly known as:  NORVASC  Take 1 tablet (10 mg total) by mouth once daily.     aspirin 81 MG EC tablet  Commonly known as:  ECOTRIN  Take 1 tablet (81 mg total) by  mouth once daily.     atorvastatin 80 MG tablet  Commonly known as:  LIPITOR  Take 1 tablet (80 mg total) by mouth once daily.     BD INSULIN SYRINGE ULTRA-FINE 1 mL 31 gauge x 5/16 Syrg  Generic drug:  insulin syringe-needle U-100  USE ONE AS DIRECTED FOUR TIMES DAILY WITH MEALS AND AT BEDTIME     blood sugar diagnostic Strp  1 each by Misc.(Non-Drug; Combo Route) route 3 (three) times daily.     blood-glucose meter kit  Use as instructed     clopidogrel 75 mg tablet  Commonly known as:  PLAVIX  Take 1 tablet (75 mg total) by mouth once daily.     diclofenac 1.3 % Pt12  Commonly known as:  FLECTOR  Apply 1 packet topically once daily.     ergocalciferol 50,000 unit Cap  Commonly known as:  ERGOCALCIFEROL  Take 1 capsule (50,000 Units total) by mouth every 7 days. Take on Mondays     flash glucose scanning reader Misc  Commonly known as:  FREESTYLE BRYAN 14 DAY READER  1 Device by Misc.(Non-Drug; Combo Route) route as needed.     flash glucose sensor Kit  Commonly known as:  FREESTYLE BRYAN 14 DAY SENSOR  1 kit by Misc.(Non-Drug; Combo Route) route every 14 (fourteen) days.     gabapentin 300 MG capsule  Commonly known as:  NEURONTIN  Take 1 capsule (300 mg total) by mouth 3 (three) times daily. for 10 days     isosorbide mononitrate 30 MG 24 hr tablet  Commonly known as:  IMDUR  Take 1 tablet (30 mg total) by mouth once daily.     liraglutide 0.6 mg/0.1 mL (18 mg/3 mL) subq PNIJ 0.6 mg/0.1 mL (18 mg/3 mL) Pnij  Commonly known as:  VICTOZA 2-KOKI  Inject 1.8 mg into the skin once daily.     metoprolol tartrate 25 MG tablet  Commonly known as:  LOPRESSOR  Take 1 tablet (25 mg total) by mouth 2 (two) times daily.     mupirocin calcium 2% 2 % cream  Commonly known as:  BACTROBAN  Apply topically 3 (three) times daily.     naloxone 4 mg/actuation Spry  Commonly known as:  NARCAN  1 spray by Nasal route once.     ondansetron 4 MG Tbdl  Commonly known as:  ZOFRAN-ODT  Take 1 tablet (4 mg total) by mouth every 8 (eight)  "hours as needed.     pen needle, diabetic 31 gauge x 3/16" Ndle  Commonly known as:  SURE-FINE PEN NEEDLES  1 each by Misc.(Non-Drug; Combo Route) route 4 (four) times daily with meals and nightly.     predniSONE 5 MG tablet  Commonly known as:  DELTASONE  Take 1 tablet (5 mg total) by mouth once daily.     sodium bicarbonate 650 MG tablet  Take 4 tablets (2,600 mg total) by mouth 2 (two) times daily.     tacrolimus 0.5 MG Cap  Commonly known as:  PROGRAF  Take 3 capsules (1.5 mg total) by mouth every 12 (twelve) hours.     TRESIBA FLEXTOUCH U-100 100 unit/mL (3 mL) Inpn  Generic drug:  insulin degludec  Inject 30 Units into the skin 2 (two) times daily.            Geeta Leahy MD  Otorhinolaryngology-Head & Neck Surgery  [facility]  "

## 2019-09-30 NOTE — OP NOTE
DATE OF PROCEDURE:  09/27/2019.    SURGEON:  Lenard Alarcon M.D.    ASSISTANT SURGEON:  Ned Ng (RES).    ANESTHESIA:  General endotracheal anesthesia.    PROCEDURES PERFORMED:  1.  Wide local excision of malignant skin lesion of the left temple measuring 8   x 9 cm.  2.  Pursestring circumferential advancement flap.  3.  Application of ACell.    INDICATIONS FOR PROCEDURE AND PREOPERATIVE DIAGNOSES:  This is a 64-year-old   immunosuppressed gentleman with a history of a rapidly expanding squamous cell   carcinoma of the left temporal scalp.  He presents here today for definitive   resection.    POSTOPERATIVE DIAGNOSES:  This is a 64-year-old immunosuppressed gentleman with   a history of a rapidly expanding squamous cell carcinoma of the left temporal   scalp.  He presents here today for definitive resection.    PROCEDURE IN DETAIL:  After obtaining informed consent, the patient was taken to   the Operating Room in supine position.  General endotracheal anesthesia was   induced without difficulty.  The area was prepped and draped in standard sterile   fashion.  1% lidocaine with 1:100,000 epinephrine was injected into planned 1   cm margin.  The NIMS nerve monitor was inserted and sufficient time was allowed   for this to take effect.  The skin was incised with a #15 blade, carried down to   a deep dermal plane.  Dissection then proceeded centrally and circumferentially   from the border of the nearly spherical lesion.  As we approached the center of   the defect, the plane became less distinct and so dissection was then carried   down into the superficial layer of the deep temporal fascia, taking care not to   injure the temporal branch of the facial nerve.  This resulted in a good plane   off of the temporal fossa in underlying tissue.  The superficial temporal artery   was divided and ligated with 2-0 silk ties.  The specimen was then oriented and   sent for permanent histopathology.  A barbed 3-0 nylon suture  was then run   circumferentially to circumferentially close the defect and reduced with radius   without changing the circumferential orientation of the tissue.  The ACell   3-layer was then placed and secured with simple interrupted 4-0 chromic gut   suture.  Xeroform bolster was then secured and this concluded the procedure.    The patient was then rotated back to anesthesia, awakened in the Operating Room   without difficulty.  There were no complications and estimated blood loss was 10   mL.      SINAI/ELIAS  dd: 09/30/2019 09:52:41 (CDT)  td: 09/30/2019 10:09:28 (VENTURAT)  Doc ID   #[id number]  Job ID #[id number]    CC:

## 2019-09-30 NOTE — NURSING
Pt A & O x4, VS stable, dressing intact, pt positions independently, fall precautions in place, pain monitored and controlled with scheduled and PRN medication, no signs of infection, discharge medications ordered for bedside delivery, discharge teaching provided, transport via wheelchair arranged.   Pt awaiting sister's arrival for transport home. Pt is angry that he is not allowed to drive home alone after taking opioids. Pt informed that it must be greater than 24 hours since last opioid administration for independent driving.

## 2019-09-30 NOTE — PLAN OF CARE
SW following for DC needs. NOAM in communication with Elvia GUEVARA     09/30/19 1159   Post-Acute Status   Post-Acute Authorization Other   Other Status No Post-Acute Service Needs     Camila Askew LMSW  [facility]  [id number]

## 2019-10-01 NOTE — ANESTHESIA POSTPROCEDURE EVALUATION
Anesthesia Post Evaluation    Patient: Lavelle Ladd    Procedure(s) Performed: Procedure(s) (LRB):  EXCISION, SKIN (Left)    Final Anesthesia Type: general  Patient location during evaluation: PACU  Patient participation: Yes- Able to Participate  Level of consciousness: awake and alert  Post-procedure vital signs: reviewed and stable  Pain management: adequate  Airway patency: patent  PONV status at discharge: No PONV  Anesthetic complications: no      Cardiovascular status: stable  Respiratory status: unassisted and spontaneous ventilation  Hydration status: euvolemic  Follow-up not needed.          Vitals Value Taken Time   /86 9/30/2019  8:23 AM   Temp 36.4 °C (97.6 °F) 9/30/2019  8:23 AM   Pulse 66 9/30/2019  8:23 AM   Resp 16 9/30/2019  8:23 AM   SpO2 97 % 9/30/2019  8:23 AM         Event Time     Out of Recovery 15:00:00          Pain/Shereen Score: Pain Rating Prior to Med Admin: 6 (9/30/2019 12:35 PM)  Pain Rating Post Med Admin: 5 (9/30/2019 10:00 AM)

## 2019-10-04 ENCOUNTER — TELEPHONE (OUTPATIENT)
Dept: OTOLARYNGOLOGY | Facility: CLINIC | Age: 64
End: 2019-10-04

## 2019-10-04 NOTE — TELEPHONE ENCOUNTER
----- Message from Radha Jeff sent at 10/4/2019  1:18 PM CDT -----  Contact: pt   Pt is calling to speak with the nurse pt is having surgery for next Monday pt hasn't been giving a time yet can you please call pt at [phone]    NEHA

## 2019-10-07 ENCOUNTER — HOSPITAL ENCOUNTER (OUTPATIENT)
Facility: HOSPITAL | Age: 64
Discharge: HOME | End: 2019-10-08
Attending: OTOLARYNGOLOGY | Admitting: OTOLARYNGOLOGY
Payer: MEDICARE

## 2019-10-07 ENCOUNTER — ANESTHESIA (OUTPATIENT)
Dept: SURGERY | Facility: HOSPITAL | Age: 64
End: 2019-10-07
Payer: MEDICARE

## 2019-10-07 DIAGNOSIS — C44.42 SQUAMOUS CELL CARCINOMA OF SKIN OF SCALP AND NECK: Primary | ICD-10-CM

## 2019-10-07 LAB
POCT GLUCOSE: 101 MG/DL (ref 70–110)
POCT GLUCOSE: 129 MG/DL (ref 70–110)
POCT GLUCOSE: 141 MG/DL (ref 70–110)
POCT GLUCOSE: 168 MG/DL (ref 70–110)

## 2019-10-07 PROCEDURE — 15004 WOUND PREP F/N/HF/G: CPT | Mod: 58,HCNC,, | Performed by: OTOLARYNGOLOGY

## 2019-10-07 PROCEDURE — 12051 PR INTERMED WOUND REPAIR FACE/EAR/EYELID/NOSE/LIP/MUC MEBR, 2.5CM OR LESS: ICD-10-PCS | Mod: 59,79,HCNC, | Performed by: OTOLARYNGOLOGY

## 2019-10-07 PROCEDURE — 15220 PR FULL THICK GRFT SCALP,ARM,LEG <20SQC: ICD-10-PCS | Mod: 58,HCNC,, | Performed by: OTOLARYNGOLOGY

## 2019-10-07 PROCEDURE — 11442 PR EXC SKIN BENIG 1.1-2 CM FACE,FACIAL: ICD-10-PCS | Mod: 59,79,HCNC, | Performed by: OTOLARYNGOLOGY

## 2019-10-07 PROCEDURE — 36000707: Mod: HCNC | Performed by: OTOLARYNGOLOGY

## 2019-10-07 PROCEDURE — 25000003 PHARM REV CODE 250: Mod: HCNC | Performed by: OTOLARYNGOLOGY

## 2019-10-07 PROCEDURE — 15221 PR FULL THICK GRFT SCALP,ARM ADD 20 SQCM: ICD-10-PCS | Mod: 58,HCNC,, | Performed by: OTOLARYNGOLOGY

## 2019-10-07 PROCEDURE — 11442 EXC FACE-MM B9+MARG 1.1-2 CM: CPT | Mod: 59,79,HCNC, | Performed by: OTOLARYNGOLOGY

## 2019-10-07 PROCEDURE — 37000008 HC ANESTHESIA 1ST 15 MINUTES: Mod: HCNC | Performed by: OTOLARYNGOLOGY

## 2019-10-07 PROCEDURE — 37000009 HC ANESTHESIA EA ADD 15 MINS: Mod: HCNC | Performed by: OTOLARYNGOLOGY

## 2019-10-07 PROCEDURE — 71000039 HC RECOVERY, EACH ADD'L HOUR: Mod: HCNC | Performed by: OTOLARYNGOLOGY

## 2019-10-07 PROCEDURE — 88304 TISSUE SPECIMEN TO PATHOLOGY - SURGERY: ICD-10-PCS | Mod: 26,HCNC,, | Performed by: PATHOLOGY

## 2019-10-07 PROCEDURE — 63600175 PHARM REV CODE 636 W HCPCS: Mod: HCNC | Performed by: NURSE ANESTHETIST, CERTIFIED REGISTERED

## 2019-10-07 PROCEDURE — 15004 PR WND PREP PED, FACE/NCK/HND/FT/GEN 1ST 100 CM: ICD-10-PCS | Mod: 58,HCNC,, | Performed by: OTOLARYNGOLOGY

## 2019-10-07 PROCEDURE — 63600175 PHARM REV CODE 636 W HCPCS: Mod: HCNC | Performed by: STUDENT IN AN ORGANIZED HEALTH CARE EDUCATION/TRAINING PROGRAM

## 2019-10-07 PROCEDURE — 15220 FTH/GFT FR S/A/L 20 SQ CM/<: CPT | Mod: 58,HCNC,, | Performed by: OTOLARYNGOLOGY

## 2019-10-07 PROCEDURE — 36000706: Mod: HCNC | Performed by: OTOLARYNGOLOGY

## 2019-10-07 PROCEDURE — 63600175 PHARM REV CODE 636 W HCPCS: Mod: HCNC | Performed by: OTOLARYNGOLOGY

## 2019-10-07 PROCEDURE — D9220A PRA ANESTHESIA: ICD-10-PCS | Mod: HCNC,,, | Performed by: ANESTHESIOLOGY

## 2019-10-07 PROCEDURE — 25000003 PHARM REV CODE 250: Mod: HCNC | Performed by: NURSE ANESTHETIST, CERTIFIED REGISTERED

## 2019-10-07 PROCEDURE — 82962 GLUCOSE BLOOD TEST: CPT | Mod: HCNC | Performed by: OTOLARYNGOLOGY

## 2019-10-07 PROCEDURE — 12051 INTMD RPR FACE/MM 2.5 CM/<: CPT | Mod: 59,79,HCNC, | Performed by: OTOLARYNGOLOGY

## 2019-10-07 PROCEDURE — 88304 TISSUE EXAM BY PATHOLOGIST: CPT | Mod: HCNC | Performed by: PATHOLOGY

## 2019-10-07 PROCEDURE — D9220A PRA ANESTHESIA: Mod: HCNC,,, | Performed by: ANESTHESIOLOGY

## 2019-10-07 PROCEDURE — 71000033 HC RECOVERY, INTIAL HOUR: Mod: HCNC | Performed by: OTOLARYNGOLOGY

## 2019-10-07 PROCEDURE — 27000221 HC OXYGEN, UP TO 24 HOURS: Mod: HCNC

## 2019-10-07 PROCEDURE — 15221 FTH/GFT FR S/A/L EACH ADDL: CPT | Mod: 58,HCNC,, | Performed by: OTOLARYNGOLOGY

## 2019-10-07 PROCEDURE — 94761 N-INVAS EAR/PLS OXIMETRY MLT: CPT | Mod: HCNC

## 2019-10-07 RX ORDER — AMLODIPINE BESYLATE 10 MG/1
10 TABLET ORAL DAILY
Status: DISCONTINUED | OUTPATIENT
Start: 2019-10-07 | End: 2019-10-08 | Stop reason: HOSPADM

## 2019-10-07 RX ORDER — KETAMINE HCL IN 0.9 % NACL 50 MG/5 ML
SYRINGE (ML) INTRAVENOUS
Status: DISCONTINUED | OUTPATIENT
Start: 2019-10-07 | End: 2019-10-07

## 2019-10-07 RX ORDER — IBUPROFEN 200 MG
24 TABLET ORAL
Status: DISCONTINUED | OUTPATIENT
Start: 2019-10-07 | End: 2019-10-08 | Stop reason: HOSPADM

## 2019-10-07 RX ORDER — METOPROLOL TARTRATE 25 MG/1
25 TABLET, FILM COATED ORAL 2 TIMES DAILY
Status: DISCONTINUED | OUTPATIENT
Start: 2019-10-07 | End: 2019-10-08 | Stop reason: HOSPADM

## 2019-10-07 RX ORDER — MORPHINE SULFATE 2 MG/ML
4 INJECTION, SOLUTION INTRAMUSCULAR; INTRAVENOUS
Status: DISCONTINUED | OUTPATIENT
Start: 2019-10-07 | End: 2019-10-08 | Stop reason: HOSPADM

## 2019-10-07 RX ORDER — VASOPRESSIN 20 [USP'U]/ML
INJECTION, SOLUTION INTRAMUSCULAR; SUBCUTANEOUS
Status: DISCONTINUED | OUTPATIENT
Start: 2019-10-07 | End: 2019-10-07

## 2019-10-07 RX ORDER — INSULIN ASPART 100 [IU]/ML
1-10 INJECTION, SOLUTION INTRAVENOUS; SUBCUTANEOUS
Status: DISCONTINUED | OUTPATIENT
Start: 2019-10-07 | End: 2019-10-08 | Stop reason: HOSPADM

## 2019-10-07 RX ORDER — ASPIRIN 81 MG/1
81 TABLET ORAL DAILY
Status: DISCONTINUED | OUTPATIENT
Start: 2019-10-07 | End: 2019-10-08 | Stop reason: HOSPADM

## 2019-10-07 RX ORDER — CEFAZOLIN SODIUM 1 G/3ML
2 INJECTION, POWDER, FOR SOLUTION INTRAMUSCULAR; INTRAVENOUS
Status: COMPLETED | OUTPATIENT
Start: 2019-10-07 | End: 2019-10-07

## 2019-10-07 RX ORDER — FENTANYL CITRATE 50 UG/ML
INJECTION, SOLUTION INTRAMUSCULAR; INTRAVENOUS
Status: DISCONTINUED | OUTPATIENT
Start: 2019-10-07 | End: 2019-10-07

## 2019-10-07 RX ORDER — PROPOFOL 10 MG/ML
VIAL (ML) INTRAVENOUS
Status: DISCONTINUED | OUTPATIENT
Start: 2019-10-07 | End: 2019-10-07

## 2019-10-07 RX ORDER — BACITRACIN 500 [USP'U]/G
OINTMENT TOPICAL 3 TIMES DAILY
Status: DISCONTINUED | OUTPATIENT
Start: 2019-10-07 | End: 2019-10-08 | Stop reason: HOSPADM

## 2019-10-07 RX ORDER — ROCURONIUM BROMIDE 10 MG/ML
INJECTION, SOLUTION INTRAVENOUS
Status: DISCONTINUED | OUTPATIENT
Start: 2019-10-07 | End: 2019-10-07

## 2019-10-07 RX ORDER — SODIUM CHLORIDE 9 MG/ML
INJECTION, SOLUTION INTRAVENOUS CONTINUOUS
Status: DISCONTINUED | OUTPATIENT
Start: 2019-10-07 | End: 2019-10-08 | Stop reason: HOSPADM

## 2019-10-07 RX ORDER — CLOPIDOGREL BISULFATE 75 MG/1
75 TABLET ORAL DAILY
Status: DISCONTINUED | OUTPATIENT
Start: 2019-10-08 | End: 2019-10-08 | Stop reason: HOSPADM

## 2019-10-07 RX ORDER — ATORVASTATIN CALCIUM 20 MG/1
80 TABLET, FILM COATED ORAL DAILY
Status: DISCONTINUED | OUTPATIENT
Start: 2019-10-08 | End: 2019-10-08 | Stop reason: HOSPADM

## 2019-10-07 RX ORDER — PREDNISONE 5 MG/1
5 TABLET ORAL DAILY
Status: DISCONTINUED | OUTPATIENT
Start: 2019-10-08 | End: 2019-10-08 | Stop reason: HOSPADM

## 2019-10-07 RX ORDER — GLUCAGON 1 MG
1 KIT INJECTION
Status: DISCONTINUED | OUTPATIENT
Start: 2019-10-07 | End: 2019-10-08 | Stop reason: HOSPADM

## 2019-10-07 RX ORDER — HYDROCODONE BITARTRATE AND ACETAMINOPHEN 10; 325 MG/1; MG/1
2 TABLET ORAL EVERY 6 HOURS PRN
Status: DISCONTINUED | OUTPATIENT
Start: 2019-10-07 | End: 2019-10-08 | Stop reason: HOSPADM

## 2019-10-07 RX ORDER — SODIUM CHLORIDE 0.9 % (FLUSH) 0.9 %
10 SYRINGE (ML) INJECTION
Status: DISCONTINUED | OUTPATIENT
Start: 2019-10-07 | End: 2019-10-08 | Stop reason: HOSPADM

## 2019-10-07 RX ORDER — CEFAZOLIN SODIUM 1 G/3ML
2 INJECTION, POWDER, FOR SOLUTION INTRAMUSCULAR; INTRAVENOUS
Status: COMPLETED | OUTPATIENT
Start: 2019-10-07 | End: 2019-10-08

## 2019-10-07 RX ORDER — MIDAZOLAM HYDROCHLORIDE 1 MG/ML
INJECTION INTRAMUSCULAR; INTRAVENOUS
Status: DISCONTINUED | OUTPATIENT
Start: 2019-10-07 | End: 2019-10-07

## 2019-10-07 RX ORDER — LIDOCAINE 50 MG/G
1 PATCH TOPICAL
Status: DISCONTINUED | OUTPATIENT
Start: 2019-10-07 | End: 2019-10-08 | Stop reason: HOSPADM

## 2019-10-07 RX ORDER — LIDOCAINE HYDROCHLORIDE AND EPINEPHRINE 10; 10 MG/ML; UG/ML
INJECTION, SOLUTION INFILTRATION; PERINEURAL
Status: DISCONTINUED | OUTPATIENT
Start: 2019-10-07 | End: 2019-10-07 | Stop reason: HOSPADM

## 2019-10-07 RX ORDER — SODIUM CHLORIDE 0.9 % (FLUSH) 0.9 %
10 SYRINGE (ML) INJECTION
Status: DISCONTINUED | OUTPATIENT
Start: 2019-10-07 | End: 2019-10-07 | Stop reason: HOSPADM

## 2019-10-07 RX ORDER — HYDROCODONE BITARTRATE AND ACETAMINOPHEN 10; 325 MG/1; MG/1
1 TABLET ORAL EVERY 6 HOURS PRN
Status: DISCONTINUED | OUTPATIENT
Start: 2019-10-07 | End: 2019-10-08 | Stop reason: HOSPADM

## 2019-10-07 RX ORDER — LIDOCAINE HCL/PF 100 MG/5ML
SYRINGE (ML) INTRAVENOUS
Status: DISCONTINUED | OUTPATIENT
Start: 2019-10-07 | End: 2019-10-07

## 2019-10-07 RX ORDER — LIDOCAINE HYDROCHLORIDE 10 MG/ML
1 INJECTION, SOLUTION EPIDURAL; INFILTRATION; INTRACAUDAL; PERINEURAL ONCE
Status: COMPLETED | OUTPATIENT
Start: 2019-10-07 | End: 2019-10-07

## 2019-10-07 RX ORDER — ONDANSETRON HYDROCHLORIDE 2 MG/ML
INJECTION, SOLUTION INTRAMUSCULAR; INTRAVENOUS
Status: DISCONTINUED | OUTPATIENT
Start: 2019-10-07 | End: 2019-10-07

## 2019-10-07 RX ORDER — GABAPENTIN 300 MG/1
300 CAPSULE ORAL 3 TIMES DAILY
Status: DISCONTINUED | OUTPATIENT
Start: 2019-10-07 | End: 2019-10-08 | Stop reason: HOSPADM

## 2019-10-07 RX ORDER — HYDROMORPHONE HYDROCHLORIDE 1 MG/ML
0.2 INJECTION, SOLUTION INTRAMUSCULAR; INTRAVENOUS; SUBCUTANEOUS EVERY 5 MIN PRN
Status: DISCONTINUED | OUTPATIENT
Start: 2019-10-07 | End: 2019-10-07 | Stop reason: HOSPADM

## 2019-10-07 RX ORDER — ONDANSETRON 2 MG/ML
8 INJECTION INTRAMUSCULAR; INTRAVENOUS EVERY 12 HOURS PRN
Status: DISCONTINUED | OUTPATIENT
Start: 2019-10-07 | End: 2019-10-08 | Stop reason: HOSPADM

## 2019-10-07 RX ORDER — IBUPROFEN 200 MG
16 TABLET ORAL
Status: DISCONTINUED | OUTPATIENT
Start: 2019-10-07 | End: 2019-10-08 | Stop reason: HOSPADM

## 2019-10-07 RX ADMIN — PROPOFOL 100 MG: 10 INJECTION, EMULSION INTRAVENOUS at 09:10

## 2019-10-07 RX ADMIN — MORPHINE SULFATE 4 MG: 2 INJECTION, SOLUTION INTRAMUSCULAR; INTRAVENOUS at 09:10

## 2019-10-07 RX ADMIN — ASPIRIN 81 MG: 81 TABLET, COATED ORAL at 12:10

## 2019-10-07 RX ADMIN — HYDROMORPHONE HYDROCHLORIDE 0.2 MG: 1 INJECTION, SOLUTION INTRAMUSCULAR; INTRAVENOUS; SUBCUTANEOUS at 11:10

## 2019-10-07 RX ADMIN — PROMETHAZINE HYDROCHLORIDE 6.25 MG: 25 INJECTION INTRAMUSCULAR; INTRAVENOUS at 11:10

## 2019-10-07 RX ADMIN — LIDOCAINE HYDROCHLORIDE 10 MG: 10 INJECTION, SOLUTION EPIDURAL; INFILTRATION; INTRACAUDAL; PERINEURAL at 08:10

## 2019-10-07 RX ADMIN — VASOPRESSIN 2 UNITS: 20 INJECTION INTRAVENOUS at 09:10

## 2019-10-07 RX ADMIN — BACITRACIN: 500 OINTMENT TOPICAL at 09:10

## 2019-10-07 RX ADMIN — VASOPRESSIN 1 UNITS: 20 INJECTION INTRAVENOUS at 10:10

## 2019-10-07 RX ADMIN — LIDOCAINE HYDROCHLORIDE 100 MG: 20 INJECTION, SOLUTION INTRAVENOUS at 09:10

## 2019-10-07 RX ADMIN — LIDOCAINE 1 PATCH: 50 PATCH TOPICAL at 09:10

## 2019-10-07 RX ADMIN — PROPOFOL 50 MG: 10 INJECTION, EMULSION INTRAVENOUS at 09:10

## 2019-10-07 RX ADMIN — HYDROCODONE BITARTRATE AND ACETAMINOPHEN 1 TABLET: 10; 325 TABLET ORAL at 12:10

## 2019-10-07 RX ADMIN — ROCURONIUM BROMIDE 50 MG: 10 INJECTION, SOLUTION INTRAVENOUS at 09:10

## 2019-10-07 RX ADMIN — AMLODIPINE BESYLATE 10 MG: 10 TABLET ORAL at 01:10

## 2019-10-07 RX ADMIN — METOPROLOL TARTRATE 25 MG: 25 TABLET ORAL at 09:10

## 2019-10-07 RX ADMIN — HYDROCODONE BITARTRATE AND ACETAMINOPHEN 2 TABLET: 10; 325 TABLET ORAL at 06:10

## 2019-10-07 RX ADMIN — MIDAZOLAM HYDROCHLORIDE 2 MG: 1 INJECTION, SOLUTION INTRAMUSCULAR; INTRAVENOUS at 09:10

## 2019-10-07 RX ADMIN — CEFAZOLIN 2 G: 330 INJECTION, POWDER, FOR SOLUTION INTRAMUSCULAR; INTRAVENOUS at 09:10

## 2019-10-07 RX ADMIN — GABAPENTIN 300 MG: 300 CAPSULE ORAL at 09:10

## 2019-10-07 RX ADMIN — ONDANSETRON 4 MG: 2 INJECTION, SOLUTION INTRAMUSCULAR; INTRAVENOUS at 10:10

## 2019-10-07 RX ADMIN — FENTANYL CITRATE 100 MCG: 50 INJECTION, SOLUTION INTRAMUSCULAR; INTRAVENOUS at 09:10

## 2019-10-07 RX ADMIN — SODIUM CHLORIDE: 0.9 INJECTION, SOLUTION INTRAVENOUS at 08:10

## 2019-10-07 RX ADMIN — SUGAMMADEX 197 MG: 100 INJECTION, SOLUTION INTRAVENOUS at 11:10

## 2019-10-07 RX ADMIN — METOPROLOL TARTRATE 25 MG: 25 TABLET ORAL at 01:10

## 2019-10-07 RX ADMIN — Medication 20 MG: at 09:10

## 2019-10-07 RX ADMIN — CEFAZOLIN 2 G: 1 INJECTION, POWDER, FOR SOLUTION INTRAMUSCULAR; INTRAVENOUS at 06:10

## 2019-10-07 NOTE — BRIEF OP NOTE
[facility]  Brief Operative Note    SUMMARY     Surgery Date: 10/7/2019     Surgeon(s) and Role:     * Lenard Alarcon MD - Primary     * Edna Olivier MD - Resident - Assisting        Pre-op Diagnosis:  Malignant neoplasm of skin of scalp [C44.40]    Post-op Diagnosis:  Post-Op Diagnosis Codes:     * Malignant neoplasm of skin of scalp [C44.40]    Procedure(s) (LRB):  APPLICATION, GRAFT, SKIN, FULL-THICKNESS (Left)  EXCISION, LESION, FACE (Right)    Anesthesia: General    Description of Procedure: full thickness skin graft from left supraclavicular skin to left temple defect; right facial mass excision    Description of the findings of the procedure: see full op note for details    Estimated Blood Loss: * No values recorded between 10/7/2019  9:57 AM and 10/7/2019 11:09 AM *         Specimens:   Specimen (12h ago, onward)     Start     Ordered    10/07/19 1050  Specimen to Pathology - Surgery  Once     Comments:  Specimen to Pathology1)  Right cheek lesion (perm)      10/07/19 1042

## 2019-10-07 NOTE — TRANSFER OF CARE
"Anesthesia Transfer of Care Note    Patient: Lavelle Ladd    Procedure(s) Performed: Procedure(s) (LRB):  APPLICATION, GRAFT, SKIN, FULL-THICKNESS (Left)  EXCISION, LESION, FACE (Right)    Patient location: PACU    Anesthesia Type: general    Transport from OR: Transported from OR on 6-10 L/min O2 by face mask with adequate spontaneous ventilation    Post pain: adequate analgesia    Post assessment: no apparent anesthetic complications and tolerated procedure well    Post vital signs: stable    Level of consciousness: awake    Nausea/Vomiting: no nausea/vomiting    Complications: none    Transfer of care protocol was followed      Last vitals:   Visit Vitals  /74 (BP Location: Right arm, Patient Position: Lying)   Pulse 98   Temp 36.2 °C (97.2 °F) (Axillary)   Resp 20   Ht 5' 11" (1.803 m)   Wt 98.4 kg (217 lb)   SpO2 98%   BMI 30.27 kg/m²     "

## 2019-10-07 NOTE — NURSING TRANSFER
Nursing Transfer Note      10/7/2019     Transfer To: [address]    Transfer via stretcher    Transfer with cardiac monitoring, pt eyeglasses     Transported by PCT    Medicines sent: insulin pen    Chart send with patient: Yes    Notified: per pt no one to contact

## 2019-10-07 NOTE — H&P
ENT H&P    Head and Neck Surgery Consult     Seen in consultation from Dr Jimenez     HPI: Lavelle Ladd is a 64 y.o. male presenting with 3-4 months of an enlarging, friable, malodorous mass of L temple. He is immunosuppressed S/P a kidney transplant. He had a recent MI 1 month ago and is on ASA. He has not had prior skin cancers. He has had multiple prior surgeries without anesthesia incident.           Past Medical History:   Diagnosis Date    DINORAH (acute kidney injury) 2016    Arthritis      CAD in native artery 2019    CHF (congestive heart failure)      Chronic obstructive pulmonary disease 2016    Coronary artery disease involving native coronary artery of native heart without angina pectoris 2016    CRI (chronic renal insufficiency) 2019     donor kidney transplant for DM 16       Induction with Thymo x3 and IV solumedrol to total 875mg  Kidney Biopsy  2016: 16 glomeruli, ACR type 1 AVR type 2, significant microcirculatory changes, c4d negative, No DSA, 5 to10% fibrosis. Treated with thymo x8 2016- no rejection      Diastolic heart failure      Encounter for blood transfusion      ESRD on RRT since 10/2013 10/29/2013     Biopsy proven diabetic nephropathy and lymphoplasmacytic interstitial infiltrate not c/w with AIN (ddx sjogrens or assoc with tamm-horsefall protein extravasation)     GERD (gastroesophageal reflux disease)      History of hepatitis C, s/p successful Rx w/ SVR12 - 2017     Completed 12 weeks harvoni w/ SVR    Hyperlipidemia      Hypertension      PAD (peripheral artery disease) 2019    PIC line (peripherally inserted central catheter) flush      Prophylactic immunotherapy      Proteinuria      PVD (peripheral vascular disease) 2017     RLE BKA CT 16 Extensive atherosclerotic disease of the aorta and mesenteric arteries.     Renal hypertension      Type 2 diabetes mellitus with diabetic  neuropathy, with long-term current use of insulin 12/1/2016    Vitamin B12 deficiency                 Past Surgical History:   Procedure Laterality Date    AMPUTATION, FOOT, TRANSMETATARSAL Left 11/5/2018     Performed by Karla Wheeler DPM at [facility] OR    AMPUTATION, FOOT, TRANSMETATARSAL Left 10/31/2018     Performed by Karla Wheeler DPM at [facility] OR    AMPUTATION, FOOT, TRANSMETATARSAL Left 9/21/2018     Performed by Karla Wheeler DPM at [facility] OR    av bovine graft         Left UE    AV FISTULA PLACEMENT         left UE    BIOPSY Tranplanted Kidney N/A 8/15/2016     Performed by Paulette Hanna MD at [facility] OR 2ND FLR    BIOPSY, LIVER, TRANSJUGULAR APPROACH N/A 4/24/2014     Performed by [facility] at [facility] CATH LAB    CARDIAC CATHETERIZATION   02/2015    CATHETERIZATION, HEART, LEFT Left 7/21/2019     Performed by Andrew Valdez MD at [facility] CATH LAB    CLOSURE, WOUND Left 11/5/2018     Performed by Karla Wheeler DPM at [facility] OR    CLOSURE, WOUND Left 9/24/2018     Performed by Karla Wheeler DPM at [facility] OR    DEBRIDEMENT, METATARSAL BONE, 2 OR MORE BONES Left 11/5/2018     Performed by Karla Wheeler DPM at [facility] OR    INSERTION, CATHETER, VASCULAR N/A 10/31/2013     Performed by Ralph Mckeon MD at [facility] CATH LAB    IRRIGATION AND DEBRIDEMENT Left 7/9/2018     Performed by Katerina Magaña DPM at [facility] OR    IRRIGATION AND DEBRIDEMENT, LOWER EXTREMITY Left 10/31/2018     Performed by Karla Wheeler DPM at [facility] OR    KIDNEY TRANSPLANT   05/21/2016    LEFT LEG ANGIOGRAM N/A 6/14/2018     Performed by Donal Mcdonald MD at [facility] CATH LAB    LEG AMPUTATION THROUGH KNEE   2011     right LE, started as nail puncture leading to diabetic ulcer    LENGTHENING, TENDON, ACHILLES Left 9/24/2018     Performed by Karla Wheeler DPM at [facility] OR    TRANSPLANT-KIDNEY Right 5/21/2016     Performed by Ronny Bergeron MD at [facility] OR 2ND FLR            Current Outpatient  Medications:     amLODIPine (NORVASC) 10 MG tablet, Take 1 tablet (10 mg total) by mouth once daily., Disp: 90 tablet, Rfl: 3    aspirin (ECOTRIN) 81 MG EC tablet, Take 1 tablet (81 mg total) by mouth once daily., Disp: , Rfl: 0    atorvastatin (LIPITOR) 80 MG tablet, Take 1 tablet (80 mg total) by mouth once daily., Disp: 30 tablet, Rfl: 11    BD INSULIN SYRINGE ULTRA-FINE 1 mL 31 gauge x 5/16 Syrg, USE ONE AS DIRECTED FOUR TIMES DAILY WITH MEALS AND AT BEDTIME, Disp: 120 each, Rfl: 10    blood sugar diagnostic Strp, 1 each by Misc.(Non-Drug; Combo Route) route 3 (three) times daily., Disp: 100 each, Rfl: 11    blood-glucose meter kit, Use as instructed, Disp: 1 each, Rfl: 0    budesonide-formoterol 160-4.5 mcg (SYMBICORT) 160-4.5 mcg/actuation HFAA, Inhale 2 puffs into the lungs every 12 (twelve) hours. Wash out mouth after using, Disp: 1 Inhaler, Rfl: 12    clopidogrel (PLAVIX) 75 mg tablet, Take 1 tablet (75 mg total) by mouth once daily., Disp: 30 tablet, Rfl: 11    diclofenac (FLECTOR) 1.3 % PT12, Apply 1 packet topically once daily., Disp: , Rfl:     ergocalciferol (ERGOCALCIFEROL) 50,000 unit Cap, Take 1 capsule (50,000 Units total) by mouth every 7 days. Take on Mondays, Disp: 4 capsule, Rfl: 11    flash glucose scanning reader (FREESTYLE BRYAN 14 DAY READER) Misc, 1 Device by Misc.(Non-Drug; Combo Route) route as needed., Disp: 1 each, Rfl: 0    flash glucose sensor (FREESTYLE BRYAN 14 DAY SENSOR) Kit, 1 kit by Misc.(Non-Drug; Combo Route) route every 14 (fourteen) days., Disp: 2 kit, Rfl: 11    gabapentin (NEURONTIN) 300 MG capsule, Take 1 capsule (300 mg total) by mouth 3 (three) times daily. for 10 days, Disp: 30 capsule, Rfl: 0    HYDROcodone-acetaminophen (NORCO)  mg per tablet, , Disp: , Rfl:     hydrOXYzine (ATARAX) 50 MG tablet, 50 mg 3 (three) times daily as needed. , Disp: , Rfl:     isosorbide mononitrate (IMDUR) 30 MG 24 hr tablet, Take 1 tablet (30 mg total) by mouth once  "daily., Disp: 30 tablet, Rfl: 11    liraglutide 0.6 mg/0.1 mL, 18 mg/3 mL, subq PNIJ (VICTOZA 2-KOKI) 0.6 mg/0.1 mL (18 mg/3 mL) PnIj, Inject 1.8 mg into the skin once daily., Disp: 9 mL, Rfl: 11    metoprolol tartrate (LOPRESSOR) 25 MG tablet, Take 1 tablet (25 mg total) by mouth 2 (two) times daily., Disp: 60 tablet, Rfl: 11    mupirocin calcium 2% (BACTROBAN) 2 % cream, Apply topically 3 (three) times daily., Disp: 30 g, Rfl: 1    mycophenolate (CELLCEPT) 500 mg Tab, Take 1 tablet (500 mg total) by mouth 2 (two) times daily., Disp: 120 tablet, Rfl: 0    naloxone (NARCAN) 4 mg/actuation Spry, 1 spray by Nasal route once., Disp: , Rfl:     naproxen (NAPROSYN) 500 MG tablet, Take 1 tablet by mouth as needed., Disp: , Rfl:     ondansetron (ZOFRAN-ODT) 4 MG TbDL, Take 1 tablet (4 mg total) by mouth every 8 (eight) hours as needed., Disp: 21 tablet, Rfl: 1    pen needle, diabetic (SURE-FINE PEN NEEDLES) 31 gauge x 3/16" Ndle, 1 each by Misc.(Non-Drug; Combo Route) route 4 (four) times daily with meals and nightly., Disp: 120 each, Rfl: 11    predniSONE (DELTASONE) 5 MG tablet, TAKE ONE TABLET BY MOUTH ONE TIME DAILY, Disp: 30 tablet, Rfl: 4    sodium bicarbonate 650 MG tablet, Take 4 tablets (2,600 mg total) by mouth 2 (two) times daily., Disp: 240 tablet, Rfl: 11    tacrolimus (PROGRAF) 0.5 MG Cap, TAKE THREE CAPSULES BY MOUTH EVERY 12 HOURS, Disp: 180 capsule, Rfl: 0    TRESIBA FLEXTOUCH U-100 100 unit/mL (3 mL) InPn, Inject 30 Units into the skin 2 (two) times daily., Disp: , Rfl:   No current facility-administered medications for this visit.      Review of patient's allergies indicates:  No Known Allergies           Family History   Problem Relation Age of Onset    Cancer Father      Diabetes Father      Heart failure Father      Stroke Father      Heart failure Mother      Kidney disease Neg Hx           Social History               Socioeconomic History    Marital status: Single       Spouse " name: Not on file    Number of children: Not on file    Years of education: Not on file    Highest education level: Not on file   Occupational History    Occupation: Disabled       Employer: DISABLED   Social Needs    Financial resource strain: Not on file    Food insecurity:       Worry: Not on file       Inability: Not on file    Transportation needs:       Medical: Not on file       Non-medical: Not on file   Tobacco Use    Smoking status: Former Smoker       Packs/day: 1.00       Years: 40.00       Pack years: 40.00       Last attempt to quit: 2013       Years since quittin.6    Smokeless tobacco: Never Used   Substance and Sexual Activity    Alcohol use: Yes       Alcohol/week: 3.6 oz       Types: 6 Cans of beer per week       Comment: seldom    Drug use: No    Sexual activity: Never   Lifestyle    Physical activity:       Days per week: Not on file       Minutes per session: Not on file    Stress: Not on file   Relationships    Social connections:       Talks on phone: Not on file       Gets together: Not on file       Attends Yarsanism service: Not on file       Active member of club or organization: Not on file       Attends meetings of clubs or organizations: Not on file       Relationship status: Not on file   Other Topics Concern    Not on file   Social History Narrative     Lives alone. Retired from construction and various jobs, now retired. Would like to return to work PT to alleviate boredom.            Review of Systems -  Constitutional: Denies having night sweats, constant fatigue, loss of appetite or recent substantial weight loss.  Eyes: Denies blurred vision or double vision.  Respiratory: Denies symptoms of shortness of breath, noisy breathing, hoarseness or chronic cough.  GI: Denies symptoms of heartburn, acid regurgitation, or the known presence of a hiatal hernia.  The remainder of a 10-point review of systems is negative     REVIEW OF RADIOLOGICAL FILMS AND RECORDS  (PERSONALLY REVIEWED):  No bone or cervical LN involvement evident     REVIEW OF LABS (PERSONALLY REVIEWED)  Noncontributory     PHYSICAL EXAM:  Vitals - There were no vitals taken for this visit.  Constitutional -      General Appearance: well developed, well nourished, without obvious deformities apart from 6cm mobile fungating mass of L temple     Communication: speaks with a normal voice without hoarseness  Head & Face -     Overall: no obvious scars, lesions or masses     Parotid and submandibular glands: no masses or tenderness     Facial strength: normal and equal bilaterally  Eyes -      EOM intact  Ear, Nose, Mouth & Throat -     Ears: both left and right external auditory canals and TM's are normal, no external deformities     Nasal exam: mucosa is pink, septum is midline, visible turbinates are normal on anterior rhinoscopy     Mastication: teeth appear present     Oral Cavity and oropharynx: mucosa, hard and soft palates, tongue, posterior pharyngeal wall, lips and gums are without lesions. Tonsils appear unseen  Respiratory:     Breathing unlabored, no stridor  Cardiovascular:     No JVD, capillary refill normal  Larynx: using the mirror for indirect laryngoscopy, the epiglottic, false cords, true cords, and pyriform sinuses are without lesions and the true vocal cords move normally  Neck: appears symmetric, and on palpation is without masses   Endocrine:     Thyroid: no asymmetry, thyromegaly, or thyroid nodules on palpation  Lymphatic:     No cervical lymphadenopathy  Cranial Nerves:      II: Pupillary reflexes normal     III, IV, VI: EOM normal     V: 1,2,3: normal sensation     VII: Normal strength in all divisions     IX, X: Normal voice, palatal elevation and sensation     XI: Shoulder strength normal       XII: Tongue mobility normal  Psychiatric:     Appropriate affect     ASSESSMENT: Likely SCC of L temple in immunosuppressed patient     PLAN:   To OR today for application of skin graft to left  temple

## 2019-10-07 NOTE — PROGRESS NOTES
Sylvester EVANS notified of pt taking plavix this morning, no new orders given he stated to notify surgery team. Call into [facility] informed him of pt taking plavix this morning he stated that is okay, no new orders given.

## 2019-10-07 NOTE — OP NOTE
DATE OF PROCEDURE:  10/07/2019    SURGEON:  Lenard Alarcon M.D.    ASSISTANT SURGEON:  Edna Olivier M.D. (RES).    ANESTHESIA:  General endotracheal anesthesia.    PROCEDURES PERFORMED:  1.  Full-thickness skin graft from the neck with inset into the scalp, measuring   6 x 6.5 cm.  2.  Site preparation for flap inset.  3.  Wide local excision of malignant skin lesion, measuring 1.6 cm of the right   cheek.    INDICATIONS FOR PROCEDURE AND PREOPERATIVE DIAGNOSIS:  This is a 64-year-old   transplant patient with multiple medical problems who presents for closure of   his left temporal scalp defect and an excision of a right skin lesion.  He   underwent a wide local excision of a large rapidly enlarging cutaneous squamous   cell carcinoma last week with definitive margins thus established as negative.    He presents now for definitive reconstruction.    POSTOPERATIVE DIAGNOSIS:  This is a 64-year-old transplant patient with multiple   medical problems who presents for closure of his left temporal scalp defect and   an excision of a right skin lesion.  He underwent a wide local excision of a   large rapidly enlarging cutaneous squamous cell carcinoma last week with   definitive margins thus established as negative.  He presents now for definitive   reconstruction.    PROCEDURE IN DETAIL:  After obtaining informed consent, the patient was taken to   the Operating Room in supine position.  General endotracheal anesthesia was   induced without difficulty.  The area was prepped and draped in standard sterile   fashion.  Then, 1% lidocaine with 1:100,000 epinephrine was injected into the   planned elliptiform incision of the right cheek as well as into the planned   elliptiform incision of the left neck with the donor site for the skin graft   being placed within a naturally existing skin crease.  Sufficient time was   allowed for this to take effect.  Attention was first turned to the skin lesion   excision where an  elliptiform incision was then made down to a deep dermal   layer, encompassing all gross disease of this pearly white cutaneous lesion of   the right cheek.  Dissection then proceeded deeply with a #15 blade and the   elliptiform specimen was then sent for permanent histopathology.  Hemostasis was   achieved with bipolar electrocautery.  The wound was then closed in layers.    Deeply, the incision was reapproximated with simple interrupted 5-0 Monocryl   suture and the skin edges were opposed with a running locking 5-0 Prolene suture   with the final incision oriented to the relaxed skin tension line.  Attention   was then turned to the site preparation, where a 3-layer ACell had been   previously applied.  The area was then debrided and the top layer removed down   to incorporated healthy bleeding tissue.  The edges were then freshened to allow   take of the planned skin graft.  The donor site for the skin graft was incised   with a #15 blade, carried down to a subdermal plane.  A #15 blade was then used   to elevate this, taking care not to injure the underlying platysma muscle or to   injure the external jugular vein.  The skin graft was aggressively defatted down   to the papillary dermis and placed in saline for use later.  The donor site was   then closed deeply with simple interrupted 4-0 Monocryl sutures, running   subcuticular 5-0 Monocryl suture was used to oppose the deep dermal layer and   the skin edges were opposed with Dermabond.  The skin graft was then trimmed to   the appropriate size measuring 6.5 x 6 cm and then placed into the defect and   inset with simple interrupted and running locking 5-0 chromic gut sutures.    Xeroform gauze was then wrapped around a sterile scrub sponge for use as a   bolster dressing.  This was secured with simple interrupted 2-0 bolster sutures.    This concluded the procedure.  The patient was then rotated back to   anesthesia, awakened in the Operating Room without  difficulty.  There were no   complications and estimated blood loss was 10 mL.      RDW/IN  dd: 10/07/2019 11:48:18 (CDT)  td: 10/07/2019 14:08:03 (CDT)  Doc ID   #[id number]  Job ID #[id number]    CC:

## 2019-10-08 VITALS
WEIGHT: 217 LBS | HEIGHT: 71 IN | TEMPERATURE: 98 F | OXYGEN SATURATION: 96 % | SYSTOLIC BLOOD PRESSURE: 137 MMHG | RESPIRATION RATE: 18 BRPM | BODY MASS INDEX: 30.38 KG/M2 | HEART RATE: 75 BPM | DIASTOLIC BLOOD PRESSURE: 65 MMHG

## 2019-10-08 PROCEDURE — 63600175 PHARM REV CODE 636 W HCPCS: Mod: HCNC | Performed by: OTOLARYNGOLOGY

## 2019-10-08 PROCEDURE — 25000003 PHARM REV CODE 250: Mod: HCNC | Performed by: OTOLARYNGOLOGY

## 2019-10-08 RX ORDER — CEPHALEXIN 500 MG/1
500 CAPSULE ORAL EVERY 12 HOURS
Qty: 14 CAPSULE | Refills: 0 | Status: SHIPPED | OUTPATIENT
Start: 2019-10-08 | End: 2019-10-08 | Stop reason: HOSPADM

## 2019-10-08 RX ORDER — AMOXICILLIN AND CLAVULANATE POTASSIUM 875; 125 MG/1; MG/1
1 TABLET, FILM COATED ORAL EVERY 12 HOURS
Qty: 14 TABLET | Refills: 0 | Status: SHIPPED | OUTPATIENT
Start: 2019-10-08 | End: 2019-10-15

## 2019-10-08 RX ADMIN — BACITRACIN: 500 OINTMENT TOPICAL at 08:10

## 2019-10-08 RX ADMIN — PREDNISONE 5 MG: 5 TABLET ORAL at 08:10

## 2019-10-08 RX ADMIN — CEFAZOLIN 2 G: 1 INJECTION, POWDER, FOR SOLUTION INTRAMUSCULAR; INTRAVENOUS at 02:10

## 2019-10-08 RX ADMIN — CLOPIDOGREL BISULFATE 75 MG: 75 TABLET ORAL at 08:10

## 2019-10-08 RX ADMIN — HYDROCODONE BITARTRATE AND ACETAMINOPHEN 1 TABLET: 10; 325 TABLET ORAL at 01:10

## 2019-10-08 RX ADMIN — ATORVASTATIN CALCIUM 80 MG: 20 TABLET, FILM COATED ORAL at 08:10

## 2019-10-08 RX ADMIN — GABAPENTIN 300 MG: 300 CAPSULE ORAL at 08:10

## 2019-10-08 RX ADMIN — ASPIRIN 81 MG: 81 TABLET, COATED ORAL at 08:10

## 2019-10-08 RX ADMIN — METOPROLOL TARTRATE 25 MG: 25 TABLET ORAL at 08:10

## 2019-10-08 RX ADMIN — AMLODIPINE BESYLATE 10 MG: 10 TABLET ORAL at 08:10

## 2019-10-08 NOTE — ANESTHESIA POSTPROCEDURE EVALUATION
Anesthesia Post Evaluation    Patient: Lavelle Ladd    Procedure(s) Performed: Procedure(s) (LRB):  APPLICATION, GRAFT, SKIN, FULL-THICKNESS (Left)  EXCISION, LESION, FACE (Right)    Final Anesthesia Type: general  Patient location during evaluation: floor  Level of consciousness: awake  Post-procedure vital signs: reviewed and stable  Pain management: adequate  Airway patency: patent  PONV status at discharge: No PONV  Anesthetic complications: no      Cardiovascular status: blood pressure returned to baseline  Respiratory status: spontaneous ventilation and room air  Hydration status: euvolemic  Follow-up not needed.          Vitals Value Taken Time   /60 10/8/2019 12:24 AM   Temp 36.4 °C (97.5 °F) 10/8/2019 12:24 AM   Pulse 69 10/8/2019  2:52 AM   Resp 18 10/8/2019 12:24 AM   SpO2 97 % 10/8/2019 12:24 AM         Event Time     Out of Recovery 12:30:00          Pain/Shereen Score: Pain Rating Prior to Med Admin: 6 (10/8/2019  1:35 AM)  Pain Rating Post Med Admin: 6 (10/7/2019 12:20 PM)  Shereen Score: 9 (10/7/2019  1:12 PM)

## 2019-10-08 NOTE — NURSING
MD at bedside to reinforce postop neck incision with sutures, slight dehiscence noticed at shift change.

## 2019-10-08 NOTE — DISCHARGE SUMMARY
[facility]  Otorhinolaryngology-Head & Neck Surgery  Discharge Summary      Patient Name: Lavelle Ladd  MRN: [id number]  Admission Date: 10/7/2019  Hospital Length of Stay: 0 days  Discharge Date and Time:  10/08/2019 7:10 AM  Attending Physician: Lenard Alarcon MD   Discharging Provider: Geeta Leahy MD  Primary Care Provider: Rishabh Esteban MD     HPI: Mr. Ladd is a 65 y/o male with past medical history notable for kidney transplant, recent MI, chronic pain, and right AKA who originally presented to the ENT clinic for evaluation of a mass over the left temple that was thought to be consistent with squamous cell carcinoma. He underwent WLE of the left temple SCC with Acell placement and bolster application on 9/27/19. He returned yesterday for bolster removal with skin graft placement and excision of right facial lesion.     Procedure(s) (LRB):  APPLICATION, GRAFT, SKIN, FULL-THICKNESS (Left)  EXCISION, LESION, FACE (Right)     Hospital Course: Following completion of an electively scheduled procedure, the patient was transferred to the floor for postoperative monitoring.His  hospital course was uneventful and noted for adequate pain control and PO intake following surgery. He   is discharged home in good condition and will follow-up with Dr. 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    Specialty:  Otolaryngology  Why:  For suture removal, For wound re-check  Contact information:  [address] [place] [address] Dr Francheska H[place]ER [address] [phone]                 Patient Instructions:      Diet Adult Regular     Notify your health care provider if you experience any of the following:  temperature >100.4     Notify your health care provider if you experience any of the following:  persistent nausea and vomiting or diarrhea     Notify your health care provider if you experience any of the following:  severe uncontrolled pain     Notify your health care provider if you experience any of the following:  redness, tenderness, or signs of infection (pain, swelling, redness, odor or green/yellow discharge around incision site)     Notify your health care provider if you experience any of the following:  difficulty breathing or increased cough     Notify your health care provider if you experience any of the following:  severe persistent headache     Notify your health care provider if you experience any of the following:  worsening rash     Notify your health care provider if you experience any of the following:  persistent dizziness, light-headedness, or visual disturbances     Notify your health care provider if you experience any of the following:  increased confusion or weakness     No dressing needed   Order Comments: No heavy lifting greater than 10 lbs for 2 weeks. Keep Incision clean and dry. Keep open to air after 48 hrs. Okay to shower after 48 hrs. DO NOT scrub, soak, or submerge incision. Pad to dry. Apply bacitracin ointment to incision twice daily.     Activity as tolerated     Medications:  Reconciled Home Medications:      Medication List      START taking these medications    amoxicillin-clavulanate 875-125mg 875-125 mg per tablet  Commonly known as:  AUGMENTIN  Take 1 tablet by mouth every 12 (twelve) hours. for 7 days         CONTINUE taking these medications    amLODIPine 10 MG tablet  Commonly known as:  NORVASC  Take 1 tablet (10 mg total) by mouth once daily.     aspirin 81 MG EC tablet  Commonly known as:  ECOTRIN  Take 1 tablet (81 mg total) by mouth once daily.     atorvastatin 80 MG tablet  Commonly known as:  LIPITOR  Take 1 tablet (80 mg total) by mouth once daily.     BD INSULIN SYRINGE ULTRA-FINE 1 mL 31 gauge x 5/16 Syrg  Generic drug:  insulin syringe-needle U-100  USE ONE AS DIRECTED FOUR TIMES DAILY WITH MEALS AND AT BEDTIME     blood sugar diagnostic Strp  1 each by Misc.(Non-Drug; Combo Route) route 3 (three) times daily.     blood-glucose meter kit  Use as instructed     clopidogrel 75 mg tablet  Commonly known as:  PLAVIX  Take 1 tablet (75 mg total) by mouth once daily.     diclofenac 1.3 % Pt12  Commonly known as:  FLECTOR  Apply 1 packet topically once daily.     ergocalciferol 50,000 unit Cap  Commonly known as:  ERGOCALCIFEROL  Take 1 capsule (50,000 Units total) by mouth every 7 days. Take on Mondays     flash glucose scanning reader Misc  Commonly known as:  FREESTYLE BRYAN 14 DAY READER  1 Device by Misc.(Non-Drug; Combo Route) route as needed.     flash glucose sensor Kit  Commonly known as:  FREESTYLE BRYAN 14 DAY SENSOR  1 kit by Misc.(Non-Drug; Combo Route) route every 14 (fourteen) days.     gabapentin 300 MG capsule  Commonly known as:  NEURONTIN  Take 1 capsule (300 mg total) by mouth 3 (three) times daily. for 10 days     HYDROcodone-acetaminophen  mg per tablet  Commonly known as:  NORCO  1 tablet by Per G Tube route every 6 (six) hours as needed for Pain.     isosorbide mononitrate 30 MG 24 hr tablet  Commonly known as:  IMDUR  Take 1 tablet (30 mg total) by mouth once daily.     liraglutide 0.6 mg/0.1 mL (18 mg/3 mL) subq PNIJ 0.6 mg/0.1 mL (18 mg/3 mL) Pnij  Commonly known as:  VICTOZA 2-KOKI  Inject 1.8 mg into the skin once daily.     metoprolol tartrate 25 MG tablet  Commonly known as:   "LOPRESSOR  Take 1 tablet (25 mg total) by mouth 2 (two) times daily.     mupirocin calcium 2% 2 % cream  Commonly known as:  BACTROBAN  Apply topically 3 (three) times daily.     naloxone 4 mg/actuation Spry  Commonly known as:  NARCAN  1 spray by Nasal route once.     ondansetron 4 MG Tbdl  Commonly known as:  ZOFRAN-ODT  Take 1 tablet (4 mg total) by mouth every 8 (eight) hours as needed.     pen needle, diabetic 31 gauge x 3/16" Ndle  Commonly known as:  SURE-FINE PEN NEEDLES  1 each by Misc.(Non-Drug; Combo Route) route 4 (four) times daily with meals and nightly.     predniSONE 5 MG tablet  Commonly known as:  DELTASONE  Take 1 tablet (5 mg total) by mouth once daily.     sodium bicarbonate 650 MG tablet  Take 4 tablets (2,600 mg total) by mouth 2 (two) times daily.     tacrolimus 0.5 MG Cap  Commonly known as:  PROGRAF  Take 3 capsules (1.5 mg total) by mouth every 12 (twelve) hours.     TRESIBA FLEXTOUCH U-100 100 unit/mL (3 mL) Inpn  Generic drug:  insulin degludec  Inject 30 Units into the skin 2 (two) times daily.        STOP taking these medications    cephALEXin 250 MG capsule  Commonly known as:  KEFLEX            Geeta Leahy MD  Otorhinolaryngology-Head & Neck Surgery  [facility]  "

## 2019-10-08 NOTE — DISCHARGE INSTRUCTIONS
1. Keep bolster area clean and dry as you did previously on left temple.   2. No heavy lifting greater than 10 lbs for 2 weeks.   3. Keep neck incision Incision clean and dry. No dressing needed. Okay to shower after 48 hrs. DO NOT scrub, soak, or submerge incision. Pad to dry. Apply bacitracin ointment to incision twice daily.   4. Follow up with Dr. Alarcon in 1 week for wound re-check and suture removal.

## 2019-10-14 ENCOUNTER — OFFICE VISIT (OUTPATIENT)
Dept: OTOLARYNGOLOGY | Facility: CLINIC | Age: 64
End: 2019-10-14
Payer: MEDICARE

## 2019-10-14 ENCOUNTER — TELEPHONE (OUTPATIENT)
Dept: OTOLARYNGOLOGY | Facility: CLINIC | Age: 64
End: 2019-10-14

## 2019-10-14 VITALS — HEIGHT: 71 IN | WEIGHT: 217 LBS | BODY MASS INDEX: 30.38 KG/M2

## 2019-10-14 DIAGNOSIS — Z98.890 POST-OPERATIVE STATE: Primary | ICD-10-CM

## 2019-10-14 PROCEDURE — 99999 PR PBB SHADOW E&M-EST. PATIENT-LVL III: ICD-10-PCS | Mod: PBBFAC,HCNC,, | Performed by: PHYSICIAN ASSISTANT

## 2019-10-14 PROCEDURE — 99999 PR PBB SHADOW E&M-EST. PATIENT-LVL III: CPT | Mod: PBBFAC,HCNC,, | Performed by: PHYSICIAN ASSISTANT

## 2019-10-14 PROCEDURE — 99024 PR POST-OP FOLLOW-UP VISIT: ICD-10-PCS | Mod: HCNC,S$GLB,, | Performed by: PHYSICIAN ASSISTANT

## 2019-10-14 PROCEDURE — 99024 POSTOP FOLLOW-UP VISIT: CPT | Mod: HCNC,S$GLB,, | Performed by: PHYSICIAN ASSISTANT

## 2019-10-14 NOTE — PROGRESS NOTES
Subjective:   Patient: Lavelle Ladd [id number], :1955   Visit date:10/14/2019 11:12 AM    Chief Complaint:  No chief complaint on file.    HPI:  Lavelle is a 64 y.o. male who is here for follow-up after surgery:    Subjective: Patient is 1 wk post op. No complaints. Stated he takes pain medications from PMR MD. Mild pain. No drainage. No fevers.    Surgery date: 10/07/19    Operations performed:   1. Full-thickness skin graft from the neck with inset into the scalp, measuring   6 x 6.5 cm.  2. Site preparation for flap inset.  3. Wide local excision of malignant skin lesion, measuring 1.6 cm of the right   cheek.    Pathology:  In process    Cultures:  NA    Review of Systems:  -     Allergic/Immunologic: has No Known Allergies..  -     Constitutional: Current temp:      His meds, allergies, medical, surgical, social & family histories were reviewed & updated:  -     He has a current medication list which includes the following prescription(s): amlodipine, amoxicillin-clavulanate 875-125mg, aspirin, atorvastatin, bd insulin syringe ultra-fine, blood sugar diagnostic, blood-glucose meter, clopidogrel, diclofenac, ergocalciferol, flash glucose scanning reader, flash glucose sensor, gabapentin, hydrocodone-acetaminophen, isosorbide mononitrate, liraglutide 0.6 mg/0.1 ml (18 mg/3 ml) subq pnij, metoprolol tartrate, mupirocin calcium 2%, naloxone, ondansetron, pen needle, diabetic, prednisone, sodium bicarbonate, tacrolimus, and tresiba flextouch u-100.  -     He  has a past medical history of DINORAH (acute kidney injury) (2016), Arthritis, CAD in native artery (2019), CHF (congestive heart failure), Chronic obstructive pulmonary disease (2016), Coronary artery disease involving native coronary artery of native heart without angina pectoris (2016), CRI (chronic renal insufficiency) (2019),  donor kidney transplant for DM 16, Diastolic heart failure, Encounter for blood  transfusion, ESRD on RRT since 10/2013 (10/29/2013), GERD (gastroesophageal reflux disease), History of hepatitis C, s/p successful Rx w/ SVR12 - 4/2017 (4/5/2017), Hyperlipidemia, Hypertension, PAD (peripheral artery disease) (7/21/2019), PIC line (peripherally inserted central catheter) flush, Prophylactic immunotherapy, Proteinuria, PVD (peripheral vascular disease) (6/26/2017), Renal hypertension, Type 2 diabetes mellitus with diabetic neuropathy, with long-term current use of insulin (12/1/2016), and Vitamin B12 deficiency.   -     He does not have any pertinent problems on file.   -     He  has a past surgical history that includes Leg amputation through knee (2011); AV fistula placement; av bovine graft; Cardiac catheterization (02/2015); Kidney transplant (05/21/2016); Aortography with serialography (N/A, 6/14/2018); Foot amputation through metatarsal (Left, 9/21/2018); Closure of wound (Left, 9/24/2018); Foot amputation through metatarsal (Left, 10/31/2018); Irrigation and debridement of lower extremity (Left, 10/31/2018); Closure of wound (Left, 11/5/2018); Debridement of multiple metatarsal bones (Left, 11/5/2018); Foot amputation through metatarsal (Left, 11/5/2018); Left heart catheterization (Left, 7/21/2019); Excision of skin (Left, 9/27/2019); Full thickness skin graft (Left, 10/7/2019); and Surgical removal of lesion of face (Right, 10/7/2019).  -     He  reports that he quit smoking about 6 years ago. He has a 40.00 pack-year smoking history. He has never used smokeless tobacco. He reports that he drinks about 6.0 standard drinks of alcohol per week. He reports that he does not use drugs.  -     His family history includes Cancer in his father; Diabetes in his father; Heart failure in his father and mother; Stroke in his father.  -     He has No Known Allergies.    Objective:     Physical Exam:  Vitals:  There were no vitals taken for this visit.  General appearance:  Well developed, well  nourished, no apparent distress    Surgical site:   L cheek inc with prolene sutures in place C/DI, no surrounding erythema or edema   L Scalp inc C/D/I, no surrounding erythema or edema.   L neck inc is with small area of dehiscence centrally, very superficial, no drainage, dry and clean, no pus or blood. Removed 3 nylon sutures at distal end.      Assessment & Plan:   Diagnoses and all orders for this visit:    Post-operative state    All sutures removed w/o difficulty. Appears to be healing well. Advised pt to continue applying bactroban to suture sites  Pt does not have a f/u with Dr. Alarcon, our staff is contacting their office regarding this.       Lilia Cervantes PA-C  [facility]   [facility]  [address]  [place] [address]  P: [phone]  F: [phone]

## 2019-10-14 NOTE — TELEPHONE ENCOUNTER
----- Message from Lilia Gusman sent at 10/14/2019  8:04 AM CDT -----  Contact: self  Patient requesting a call back regarding his paper work from his last surgery. He states that it is telling him that he should have a follow up with Dr. Aleman for 10/14/19. But he does not know what. Please call patient back at [phone]

## 2019-10-14 NOTE — TELEPHONE ENCOUNTER
Patient states his discharge paperwork said to see Dr. Jimenez today for suture removal. He recently had surgery with Dr. Alarcon.  I explained that Dr. Jimenez is not in today but that I can get him in with one of our PA's at [facility] or [facility].  The patient requested [facility] due to  service.  I scheduled him to see Shayna Lilia Cervantes. PA at 11:40 today.  He verbalized understanding that this is on the 3rd floor.

## 2019-10-21 ENCOUNTER — OFFICE VISIT (OUTPATIENT)
Dept: RADIATION ONCOLOGY | Facility: CLINIC | Age: 64
End: 2019-10-21
Payer: MEDICARE

## 2019-10-21 ENCOUNTER — DOCUMENTATION ONLY (OUTPATIENT)
Dept: HEMATOLOGY/ONCOLOGY | Facility: CLINIC | Age: 64
End: 2019-10-21

## 2019-10-21 VITALS
DIASTOLIC BLOOD PRESSURE: 68 MMHG | BODY MASS INDEX: 29.3 KG/M2 | HEIGHT: 71 IN | SYSTOLIC BLOOD PRESSURE: 144 MMHG | WEIGHT: 209.31 LBS | RESPIRATION RATE: 18 BRPM | HEART RATE: 88 BPM | OXYGEN SATURATION: 97 % | TEMPERATURE: 97 F

## 2019-10-21 DIAGNOSIS — C44.42 SQUAMOUS CELL CARCINOMA OF SKIN OF SCALP AND NECK: ICD-10-CM

## 2019-10-21 DIAGNOSIS — C44.42 SQUAMOUS CELL CARCINOMA, SCALP/NECK: Primary | ICD-10-CM

## 2019-10-21 DIAGNOSIS — C44.320 SQUAMOUS CELL CARCINOMA OF SKIN OF FACE: ICD-10-CM

## 2019-10-21 PROCEDURE — 3078F DIAST BP <80 MM HG: CPT | Mod: HCNC,CPTII,S$GLB, | Performed by: RADIOLOGY

## 2019-10-21 PROCEDURE — 99214 PR OFFICE/OUTPT VISIT, EST, LEVL IV, 30-39 MIN: ICD-10-PCS | Mod: HCNC,S$GLB,, | Performed by: RADIOLOGY

## 2019-10-21 PROCEDURE — 1101F PR PT FALLS ASSESS DOC 0-1 FALLS W/OUT INJ PAST YR: ICD-10-PCS | Mod: HCNC,CPTII,S$GLB, | Performed by: RADIOLOGY

## 2019-10-21 PROCEDURE — 99999 PR PBB SHADOW E&M-EST. PATIENT-LVL V: ICD-10-PCS | Mod: PBBFAC,HCNC,, | Performed by: RADIOLOGY

## 2019-10-21 PROCEDURE — 99214 OFFICE O/P EST MOD 30 MIN: CPT | Mod: HCNC,S$GLB,, | Performed by: RADIOLOGY

## 2019-10-21 PROCEDURE — 1101F PT FALLS ASSESS-DOCD LE1/YR: CPT | Mod: HCNC,CPTII,S$GLB, | Performed by: RADIOLOGY

## 2019-10-21 PROCEDURE — 99999 PR PBB SHADOW E&M-EST. PATIENT-LVL V: CPT | Mod: PBBFAC,HCNC,, | Performed by: RADIOLOGY

## 2019-10-21 PROCEDURE — 3078F PR MOST RECENT DIASTOLIC BLOOD PRESSURE < 80 MM HG: ICD-10-PCS | Mod: HCNC,CPTII,S$GLB, | Performed by: RADIOLOGY

## 2019-10-21 PROCEDURE — 3077F PR MOST RECENT SYSTOLIC BLOOD PRESSURE >= 140 MM HG: ICD-10-PCS | Mod: HCNC,CPTII,S$GLB, | Performed by: RADIOLOGY

## 2019-10-21 PROCEDURE — 3077F SYST BP >= 140 MM HG: CPT | Mod: HCNC,CPTII,S$GLB, | Performed by: RADIOLOGY

## 2019-10-21 NOTE — PROGRESS NOTES
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 8/15/19 shave biopsy returned squamous cell carcinoma.  2. 2019 he had an ST-elevation MI.  3. 19 wide local excision left temple with circumferential advancement flap.  Pathology invasive squamous cell carcinoma, keratinizing measuring 6.5 cm, completely excised with negative margins.  No lymphovascular or perineural invasion.  Moderately differentiated.  Tumor was 0.4 mm from deep margin.  It was widely excised from peripheral margins.    CURRENT STATUS: Lavelle Ladd is a pleasant 64 y.o. male who presents today for follow-up.  He had surgery with advancement flap as above.  I reviewed the pathology in detail.  He gets grasped neck donor site incision in the sleep few days ago and had some bleeding but the bleeding stopped.  He continues to have some pain in the right stump, and has a pain management doctor and is due for a new prosthesis soon.    PAST MEDICAL HISTORY:  Past Medical History:   Diagnosis Date    DINORAH (acute kidney injury) 2016    Arthritis     CAD in native artery 2019    CHF (congestive heart failure)     Chronic obstructive pulmonary disease 2016    Coronary artery disease involving native coronary artery of native heart without angina pectoris 2016    CRI (chronic renal insufficiency) 2019     donor kidney transplant for DM 16     Induction with Thymo x3 and IV solumedrol to total 875mg  Kidney Biopsy  2016: 16 glomeruli, ACR type 1 AVR type 2, significant microcirculatory changes, c4d negative, No DSA, 5 to10% fibrosis. Treated with thymo x8 2016- no rejection      Diastolic heart failure     Encounter for blood transfusion     ESRD on RRT since 10/2013 10/29/2013    Biopsy proven diabetic  nephropathy and lymphoplasmacytic interstitial infiltrate not c/w with AIN (ddx sjogrens or assoc with tamm-horsefall protein extravasation)     GERD (gastroesophageal reflux disease)     History of hepatitis C, s/p successful Rx w/ SVR12 - 4/2017 4/5/2017    Completed 12 weeks harvoni w/ SVR    Hyperlipidemia     Hypertension     PAD (peripheral artery disease) 7/21/2019    PIC line (peripherally inserted central catheter) flush     Prophylactic immunotherapy     Proteinuria     PVD (peripheral vascular disease) 6/26/2017    RLE BKA CT 12/11/16 Extensive atherosclerotic disease of the aorta and mesenteric arteries.     Renal hypertension     Type 2 diabetes mellitus with diabetic neuropathy, with long-term current use of insulin 12/1/2016    Vitamin B12 deficiency        PAST SURGICAL HISTORY:  Past Surgical History:   Procedure Laterality Date    AORTOGRAPHY WITH SERIALOGRAPHY N/A 6/14/2018    Procedure: LEFT LEG ANGIOGRAM;  Surgeon: Donal Mcdonald MD;  Location: [facility] CATH LAB;  Service: Vascular;  Laterality: N/A;    av bovine graft      Left UE    AV FISTULA PLACEMENT      left UE    CARDIAC CATHETERIZATION  02/2015    CLOSURE OF WOUND Left 9/24/2018    Procedure: CLOSURE, WOUND;  Surgeon: Karla Wheeler DPM;  Location: [facility] OR;  Service: Podiatry;  Laterality: Left;  Secondary Wound closure, extensive    CLOSURE OF WOUND Left 11/5/2018    Procedure: CLOSURE, WOUND;  Surgeon: Karla Wheeler DPM;  Location: [facility] OR;  Service: Podiatry;  Laterality: Left;    DEBRIDEMENT OF MULTIPLE METATARSAL BONES Left 11/5/2018    Procedure: DEBRIDEMENT, METATARSAL BONE, 2 OR MORE BONES;  Surgeon: Karla Wheeler DPM;  Location: [facility] OR;  Service: Podiatry;  Laterality: Left;    EXCISION OF SKIN Left 9/27/2019    Procedure: EXCISION, SKIN;  Surgeon: Lenard Alarcon MD;  [facility];  Service: Plastics;  Laterality: Left;  Plastics set, NIMS monitor, ACell    FOOT AMPUTATION THROUGH  METATARSAL Left 9/21/2018    Procedure: AMPUTATION, FOOT, TRANSMETATARSAL;  Surgeon: Karla Wheeler DPM;  Location: [facility] OR;  Service: Podiatry;  Laterality: Left;    FOOT AMPUTATION THROUGH METATARSAL Left 10/31/2018    Procedure: AMPUTATION, FOOT, TRANSMETATARSAL;  Surgeon: Karla Wheeler DPM;  Location: [facility] OR;  Service: Podiatry;  Laterality: Left;    FOOT AMPUTATION THROUGH METATARSAL Left 11/5/2018    Procedure: AMPUTATION, FOOT, TRANSMETATARSAL;  Surgeon: Karla Wheeler DPM;  Location: [facility] OR;  Service: Podiatry;  Laterality: Left;  revisional transmetatarsal amputation, Left foot    IRRIGATION AND DEBRIDEMENT OF LOWER EXTREMITY Left 10/31/2018    Procedure: IRRIGATION AND DEBRIDEMENT, LOWER EXTREMITY;  Surgeon: Karla Wheeler DPM;  Location: [facility] OR;  Service: Podiatry;  Laterality: Left;    KIDNEY TRANSPLANT  05/21/2016    LEFT HEART CATHETERIZATION Left 7/21/2019    Procedure: CATHETERIZATION, HEART, LEFT;  Surgeon: Andrew Valdez MD;  Location: [facility] CATH LAB;  Service: Cardiology;  Laterality: Left;    LEG AMPUTATION THROUGH KNEE  2011    right LE, started as nail puncture leading to diabetic ulcer    SKIN FULL THICKNESS GRAFT Left 10/7/2019    Procedure: APPLICATION, GRAFT, SKIN, FULL-THICKNESS;  Surgeon: Lenard Alarcon MD;  Location: [facility] OR [address] [place] [address];  Service: Plastics;  Laterality: Left;    SURGICAL REMOVAL OF LESION OF FACE Right 10/7/2019    Procedure: EXCISION, LESION, FACE;  Surgeon: Lenard Alarcon MD;  [facility];  Service: Plastics;  Laterality: Right;       SOCIAL HISTORY:  Social History     Socioeconomic History    Marital status: Single     Spouse name: Not on file    Number of children: Not on file    Years of education: Not on file    Highest education level: Not on file   Occupational History    Occupation: Disabled     Employer: DISABLED   Social Needs    Financial resource strain: Not on file    Food insecurity:     Worry: Not on  "file     Inability: Not on file    Transportation needs:     Medical: Not on file     Non-medical: Not on file   Tobacco Use    Smoking status: Former Smoker     Packs/day: 1.00     Years: 40.00     Pack years: 40.00     Last attempt to quit: 2013     Years since quittin.7    Smokeless tobacco: Never Used   Substance and Sexual Activity    Alcohol use: Yes     Alcohol/week: 6.0 standard drinks     Types: 6 Cans of beer per week     Comment: seldom    Drug use: No    Sexual activity: Never   Lifestyle    Physical activity:     Days per week: Not on file     Minutes per session: Not on file    Stress: Not on file   Relationships    Social connections:     Talks on phone: Not on file     Gets together: Not on file     Attends Hindu service: Not on file     Active member of club or organization: Not on file     Attends meetings of clubs or organizations: Not on file     Relationship status: Not on file   Other Topics Concern    Not on file   Social History Narrative    Lives alone. Retired from construction and various jobs, now retired. Would like to return to work PT to alleviate boredom.       FAMILY HISTORY:  Family History   Problem Relation Age of Onset    Cancer Father     Diabetes Father     Heart failure Father     Stroke Father     Heart failure Mother     Kidney disease Neg Hx        PHYSICAL EXAMINATION:  Constitutional/Vitals: well appearing, no acute distress, ECOG 2 - Ambulates, capable of self care only, BP (!) 144/68   Pulse 88   Temp 97 °F (36.1 °C)   Resp 18   Ht 5' 11" (1.803 m)   Wt 94.9 kg (209 lb 4.8 oz)   SpO2 97%   BMI 29.19 kg/m²   ENT:  Left temporal circular flap healing well with some mild scabbing along the periphery and faint erythema.  No drainage, nontender.  No recurrence.  Left neck medial portion of the incision open approximately 1 cm with some dried blood and granulation tissue.  No drainage or tenderness.  Lymphatic: no cervical, supraclavicular " adenopathy    IMAGING AND LABORATORY FINDINGS: As per HPI; images, labs and medical records reviewed personally in EPIC.    ASSESSMENT:  High risk squamous cell carcinoma of the face    PLAN:  We discussed the role of adjuvant radiation.  Another option is observation.  He has high risk features for recurrence without radiation such as size, close deep margin at 0.4 mm, location of the head and neck and immunocompromise as well as ulceration. Although a local failure with possibly be salvaged with surgery, it may be deep and close to the skull and given his comorbidities, local failure could be quite morbid.  This area should be amenable to superficial radiation with electrons which would give very little dose to the underlying brain, eye and ocular structures so it should be well tolerated with the exception of radiation dermatitis.  He wishes to proceed with adjuvant radiation.  Transportation is an issue so I will try to treat him in 3-4 weeks.  I have contacted the  to discuss transportation.  We will begin in the next 2 weeks.    STEPHANIE Rodríguez M.D.  Radiation Oncology  [facility] [address] [place] [address]  Ph: [phone]  [email or web address]

## 2019-10-21 NOTE — PROGRESS NOTES
Sw was referred from [facility] volunteer to meet with patient. Sw met with patient to introduce herself and discuss any needs. Pt reports he drives himself. He consider his sister and significant other his support system. He reports he wants to do as much as he can on his own before asking for help. Sw offered gas card due to patient becoming set up for radiation. Pt denies need for gas card. Pt report he does require assistance when arriving to facility. He reports the walk is too far for his prosthetic leg. He reports he is being fitted for a new prosthetic leg which should help. Sw provided patient her contact number to call on Friday once he arrives and SW will bring wheelchair out to get patient. Sw will f/u with patient at his next appointment.

## 2019-10-23 ENCOUNTER — TELEPHONE (OUTPATIENT)
Dept: HEMATOLOGY/ONCOLOGY | Facility: CLINIC | Age: 64
End: 2019-10-23

## 2019-10-23 NOTE — TELEPHONE ENCOUNTER
Patient left VM for Sw. Sw returned patient's call on today at [phone]. Pt inquired about a wheelchair. Pt reports he obtained a wheelchair over 7 years ago. Pt reports its not working properly. Sw reports she will f/u with DME because she believes insurance will cover wheelchair if its been 5 years. Sw will f/u with MD on possible DME order.

## 2019-10-24 ENCOUNTER — DOCUMENTATION ONLY (OUTPATIENT)
Dept: HEMATOLOGY/ONCOLOGY | Facility: CLINIC | Age: 64
End: 2019-10-24

## 2019-10-24 DIAGNOSIS — M47.816 OSTEOARTHRITIS OF LUMBAR SPINE, UNSPECIFIED SPINAL OSTEOARTHRITIS COMPLICATION STATUS: Primary | ICD-10-CM

## 2019-10-24 DIAGNOSIS — Z94.0 KIDNEY TRANSPLANT RECIPIENT: ICD-10-CM

## 2019-10-25 ENCOUNTER — HOSPITAL ENCOUNTER (OUTPATIENT)
Dept: RADIATION THERAPY | Facility: HOSPITAL | Age: 64
Discharge: HOME | End: 2019-10-25
Attending: RADIOLOGY
Payer: MEDICARE

## 2019-10-25 ENCOUNTER — HOSPITAL ENCOUNTER (OUTPATIENT)
Dept: RADIOLOGY | Facility: HOSPITAL | Age: 64
Discharge: HOME | End: 2019-10-25
Attending: RADIOLOGY
Payer: MEDICARE

## 2019-10-25 PROCEDURE — 77263 THER RADIOLOGY TX PLNG CPLX: CPT | Mod: HCNC,,, | Performed by: RADIOLOGY

## 2019-10-25 PROCEDURE — 77290 THER RAD SIMULAJ FIELD CPLX: CPT | Mod: TC,HCNC | Performed by: RADIOLOGY

## 2019-10-25 PROCEDURE — 77334 PR  RADN TREATMENT AID(S) COMPLX: ICD-10-PCS | Mod: 26,HCNC,, | Performed by: RADIOLOGY

## 2019-10-25 PROCEDURE — 77014 HC CT GUIDANCE RADIATION THERAPY FLDS PLACEMENT: CPT | Mod: TC,HCNC | Performed by: RADIOLOGY

## 2019-10-25 PROCEDURE — 77290 PR  SET RADN THERAPY FIELD COMPLEX: ICD-10-PCS | Mod: 26,HCNC,, | Performed by: RADIOLOGY

## 2019-10-25 PROCEDURE — 77334 RADIATION TREATMENT AID(S): CPT | Mod: TC,HCNC | Performed by: RADIOLOGY

## 2019-10-25 PROCEDURE — 77334 RADIATION TREATMENT AID(S): CPT | Mod: 26,HCNC,, | Performed by: RADIOLOGY

## 2019-10-25 PROCEDURE — 77290 THER RAD SIMULAJ FIELD CPLX: CPT | Mod: 26,HCNC,, | Performed by: RADIOLOGY

## 2019-10-25 PROCEDURE — 77263 PR  RADIATION THERAPY PLAN COMPLEX: ICD-10-PCS | Mod: HCNC,,, | Performed by: RADIOLOGY

## 2019-10-29 PROCEDURE — 77334 RADIATION TREATMENT AID(S): CPT | Mod: TC,HCNC | Performed by: RADIOLOGY

## 2019-10-29 PROCEDURE — 77295 3-D RADIOTHERAPY PLAN: CPT | Mod: 26,HCNC,, | Performed by: RADIOLOGY

## 2019-10-29 PROCEDURE — 77295 3-D RADIOTHERAPY PLAN: CPT | Mod: TC,HCNC | Performed by: RADIOLOGY

## 2019-10-29 PROCEDURE — 77334 RADIATION TREATMENT AID(S): CPT | Mod: 26,HCNC,, | Performed by: RADIOLOGY

## 2019-10-29 PROCEDURE — 77300 RADIATION THERAPY DOSE PLAN: CPT | Mod: 26,HCNC,, | Performed by: RADIOLOGY

## 2019-10-29 PROCEDURE — 77334 PR  RADN TREATMENT AID(S) COMPLX: ICD-10-PCS | Mod: 26,HCNC,, | Performed by: RADIOLOGY

## 2019-10-29 PROCEDURE — 77295 PR 3D RADIOTHERAPY PLAN: ICD-10-PCS | Mod: 26,HCNC,, | Performed by: RADIOLOGY

## 2019-10-29 PROCEDURE — 77300 PR RADIATION THERAPY,DOSIMETRY PLAN: ICD-10-PCS | Mod: 26,HCNC,, | Performed by: RADIOLOGY

## 2019-10-29 PROCEDURE — 77300 RADIATION THERAPY DOSE PLAN: CPT | Mod: TC,HCNC | Performed by: RADIOLOGY

## 2019-10-31 ENCOUNTER — DOCUMENTATION ONLY (OUTPATIENT)
Dept: RADIATION ONCOLOGY | Facility: CLINIC | Age: 64
End: 2019-10-31

## 2019-10-31 PROCEDURE — 77412 RADIATION TX DELIVERY LVL 3: CPT | Mod: HCNC | Performed by: RADIOLOGY

## 2019-10-31 NOTE — PLAN OF CARE
Day 1 of outpatient xrt to [facility]. Head & neck handout & verbal instructions given. Skin care & side effects reviewed. Contact info was provided. Patient verbalized understanding.

## 2019-11-01 ENCOUNTER — HOSPITAL ENCOUNTER (OUTPATIENT)
Dept: RADIATION THERAPY | Facility: HOSPITAL | Age: 64
Discharge: HOME | End: 2019-11-01
Attending: RADIOLOGY
Payer: MEDICARE

## 2019-11-01 ENCOUNTER — DOCUMENTATION ONLY (OUTPATIENT)
Dept: HEMATOLOGY/ONCOLOGY | Facility: CLINIC | Age: 64
End: 2019-11-01

## 2019-11-01 PROCEDURE — 77412 RADIATION TX DELIVERY LVL 3: CPT | Mod: HCNC | Performed by: RADIOLOGY

## 2019-11-01 NOTE — PROGRESS NOTES
Sw met with patient today to follow up. Pt reports he is doing well. He reports he received his new prosthetic. He reports he is adjusting to it. Pt asked about status of his wheelchair. Sw contacted [facility] at [phone]. Staff reports staff member that is working on pt's order is out today. Sw will f/u on status of wheelchair next week and contact patient.

## 2019-11-04 PROCEDURE — 77412 RADIATION TX DELIVERY LVL 3: CPT | Mod: HCNC | Performed by: RADIOLOGY

## 2019-11-05 PROCEDURE — 77412 RADIATION TX DELIVERY LVL 3: CPT | Mod: HCNC | Performed by: RADIOLOGY

## 2019-11-06 ENCOUNTER — DOCUMENTATION ONLY (OUTPATIENT)
Dept: RADIATION ONCOLOGY | Facility: CLINIC | Age: 64
End: 2019-11-06

## 2019-11-06 ENCOUNTER — TELEPHONE (OUTPATIENT)
Dept: HEMATOLOGY/ONCOLOGY | Facility: CLINIC | Age: 64
End: 2019-11-06

## 2019-11-06 PROCEDURE — 77336 RADIATION PHYSICS CONSULT: CPT | Mod: HCNC | Performed by: RADIOLOGY

## 2019-11-06 PROCEDURE — 77412 RADIATION TX DELIVERY LVL 3: CPT | Mod: HCNC | Performed by: RADIOLOGY

## 2019-11-06 NOTE — PLAN OF CARE
Day 5 outpatient xrt to [place]. c/o weakness and fatigue. had fall yesterday. Escorted via wheelchair. c/o LBP 3/10. no headaches, no N&V. Given miaderm cream to use. Will continue to monitor.

## 2019-11-06 NOTE — TELEPHONE ENCOUNTER
Norm called [facility] to f/u on pt's wheelchair order. Wheelchair was delivered on 11/4. Norm attempted to contact patient. Norm did not receive an answer and was unable to leave  due to mailbox being full.

## 2019-11-07 ENCOUNTER — DOCUMENTATION ONLY (OUTPATIENT)
Dept: HEMATOLOGY/ONCOLOGY | Facility: CLINIC | Age: 64
End: 2019-11-07

## 2019-11-07 PROCEDURE — 77412 RADIATION TX DELIVERY LVL 3: CPT | Mod: HCNC | Performed by: RADIOLOGY

## 2019-11-07 NOTE — PROGRESS NOTES
Norm met with patient leaving radiation to ask about wheelchair. He reports he received it. Pt reports he fell last week at the [facility] and have been experiencing some back pain. Sw reports she will inform MD and encouraged pt to talk to MD about it at his next visit. Pt is driving himself to and from treatment. He reports he is well enough for now to continue driving. Norm encouraged pt to call if have any needs she can assist with.

## 2019-11-08 PROCEDURE — 77412 RADIATION TX DELIVERY LVL 3: CPT | Mod: HCNC | Performed by: RADIOLOGY

## 2019-11-11 PROCEDURE — 77412 RADIATION TX DELIVERY LVL 3: CPT | Mod: HCNC | Performed by: RADIOLOGY

## 2019-11-11 RX ORDER — PEN NEEDLE, DIABETIC 30 GX3/16"
1 NEEDLE, DISPOSABLE MISCELLANEOUS 4 TIMES DAILY
Qty: 200 EACH | Refills: 11 | Status: SHIPPED | OUTPATIENT
Start: 2019-11-11 | End: 2020-02-21 | Stop reason: SDUPTHER

## 2019-11-12 PROCEDURE — 77412 RADIATION TX DELIVERY LVL 3: CPT | Mod: HCNC | Performed by: RADIOLOGY

## 2019-11-13 ENCOUNTER — DOCUMENTATION ONLY (OUTPATIENT)
Dept: RADIATION ONCOLOGY | Facility: CLINIC | Age: 64
End: 2019-11-13

## 2019-11-13 PROCEDURE — 77336 RADIATION PHYSICS CONSULT: CPT | Mod: HCNC | Performed by: RADIOLOGY

## 2019-11-13 PROCEDURE — 77412 RADIATION TX DELIVERY LVL 3: CPT | Mod: HCNC | Performed by: RADIOLOGY

## 2019-11-13 NOTE — PLAN OF CARE
Day 10 outpatient xrt to [facility]. c/o back pain 7/10. has pain patch on and took Norco this am.. no headache no N&V. Will continue to [facility].

## 2019-11-14 PROCEDURE — 77412 RADIATION TX DELIVERY LVL 3: CPT | Mod: HCNC | Performed by: RADIOLOGY

## 2019-11-15 PROCEDURE — 77412 RADIATION TX DELIVERY LVL 3: CPT | Mod: HCNC | Performed by: RADIOLOGY

## 2019-11-18 PROCEDURE — 77412 RADIATION TX DELIVERY LVL 3: CPT | Mod: HCNC | Performed by: RADIOLOGY

## 2019-11-19 PROCEDURE — 77412 RADIATION TX DELIVERY LVL 3: CPT | Mod: HCNC | Performed by: RADIOLOGY

## 2019-11-20 ENCOUNTER — DOCUMENTATION ONLY (OUTPATIENT)
Dept: RADIATION ONCOLOGY | Facility: CLINIC | Age: 64
End: 2019-11-20

## 2019-11-20 ENCOUNTER — TELEPHONE (OUTPATIENT)
Dept: OTOLARYNGOLOGY | Facility: CLINIC | Age: 64
End: 2019-11-20

## 2019-11-20 PROCEDURE — 77412 RADIATION TX DELIVERY LVL 3: CPT | Mod: HCNC | Performed by: RADIOLOGY

## 2019-11-20 PROCEDURE — 77336 RADIATION PHYSICS CONSULT: CPT | Mod: HCNC | Performed by: RADIOLOGY

## 2019-11-20 NOTE — TELEPHONE ENCOUNTER
S/w patient. Scheduled appointment with Dr. Alarcon. He voice understanding of time/place/date. Sent reminder letter also.

## 2019-11-20 NOTE — PLAN OF CARE
Day 15 outpatient xrt to [facility]. C/o having headaches frequently. Is concerned with a spot on his right temple that looks like the skin cancer on the left. Will have examined. Will continue to monitor.

## 2019-11-20 NOTE — TELEPHONE ENCOUNTER
----- Message from Chapin Rodríguez II, MD sent at 11/20/2019  9:18 AM CST -----  This patient has some new areas on the right temple and left ear that he is concerned about.  Please get in for evaulation.

## 2019-11-20 NOTE — TELEPHONE ENCOUNTER
Yes, he does need to be seen.  He has had surgery with Dr. Alarcon in the past and he is here Friday 12/6- would be ideal to put him on his schedule for evaluation when he is here, if patient can't make that date I am happy to see him.

## 2019-11-21 DIAGNOSIS — I25.10 CORONARY ARTERY DISEASE WITHOUT ANGINA PECTORIS, UNSPECIFIED VESSEL OR LESION TYPE, UNSPECIFIED WHETHER NATIVE OR TRANSPLANTED HEART: Primary | ICD-10-CM

## 2019-11-21 PROCEDURE — 77412 RADIATION TX DELIVERY LVL 3: CPT | Mod: HCNC | Performed by: RADIOLOGY

## 2019-11-21 RX ORDER — METOPROLOL TARTRATE 25 MG/1
25 TABLET, FILM COATED ORAL 2 TIMES DAILY
Qty: 60 TABLET | Refills: 11 | Status: SHIPPED | OUTPATIENT
Start: 2019-11-21 | End: 2020-12-15 | Stop reason: SDUPTHER

## 2019-11-22 PROCEDURE — 77412 RADIATION TX DELIVERY LVL 3: CPT | Mod: HCNC | Performed by: RADIOLOGY

## 2019-11-25 PROCEDURE — 77412 RADIATION TX DELIVERY LVL 3: CPT | Mod: HCNC | Performed by: RADIOLOGY

## 2019-11-26 PROCEDURE — 77412 RADIATION TX DELIVERY LVL 3: CPT | Mod: HCNC | Performed by: RADIOLOGY

## 2019-11-27 ENCOUNTER — DOCUMENTATION ONLY (OUTPATIENT)
Dept: RADIATION ONCOLOGY | Facility: CLINIC | Age: 64
End: 2019-11-27

## 2019-11-27 PROCEDURE — 77412 RADIATION TX DELIVERY LVL 3: CPT | Mod: HCNC | Performed by: RADIOLOGY

## 2019-11-27 PROCEDURE — 77336 RADIATION PHYSICS CONSULT: CPT | Mod: HCNC | Performed by: RADIOLOGY

## 2019-12-03 ENCOUNTER — OFFICE VISIT (OUTPATIENT)
Dept: DERMATOLOGY | Facility: CLINIC | Age: 64
End: 2019-12-03
Payer: MEDICARE

## 2019-12-03 DIAGNOSIS — C44.92 SCC (SQUAMOUS CELL CARCINOMA): Primary | ICD-10-CM

## 2019-12-03 DIAGNOSIS — L57.0 ACTINIC KERATOSES: ICD-10-CM

## 2019-12-03 PROCEDURE — 1126F PR PAIN SEVERITY QUANTIFIED, NO PAIN PRESENT: ICD-10-PCS | Mod: HCNC,S$GLB,, | Performed by: STUDENT IN AN ORGANIZED HEALTH CARE EDUCATION/TRAINING PROGRAM

## 2019-12-03 PROCEDURE — 1126F AMNT PAIN NOTED NONE PRSNT: CPT | Mod: HCNC,S$GLB,, | Performed by: STUDENT IN AN ORGANIZED HEALTH CARE EDUCATION/TRAINING PROGRAM

## 2019-12-03 PROCEDURE — 99213 OFFICE O/P EST LOW 20 MIN: CPT | Mod: 25,HCNC,S$GLB, | Performed by: STUDENT IN AN ORGANIZED HEALTH CARE EDUCATION/TRAINING PROGRAM

## 2019-12-03 PROCEDURE — 1101F PR PT FALLS ASSESS DOC 0-1 FALLS W/OUT INJ PAST YR: ICD-10-PCS | Mod: HCNC,CPTII,S$GLB, | Performed by: STUDENT IN AN ORGANIZED HEALTH CARE EDUCATION/TRAINING PROGRAM

## 2019-12-03 PROCEDURE — 17000 DESTRUCT PREMALG LESION: CPT | Mod: HCNC,S$GLB,, | Performed by: STUDENT IN AN ORGANIZED HEALTH CARE EDUCATION/TRAINING PROGRAM

## 2019-12-03 PROCEDURE — 17003 DESTRUCT PREMALG LES 2-14: CPT | Mod: HCNC,S$GLB,, | Performed by: STUDENT IN AN ORGANIZED HEALTH CARE EDUCATION/TRAINING PROGRAM

## 2019-12-03 PROCEDURE — 99999 PR PBB SHADOW E&M-EST. PATIENT-LVL II: CPT | Mod: PBBFAC,HCNC,, | Performed by: STUDENT IN AN ORGANIZED HEALTH CARE EDUCATION/TRAINING PROGRAM

## 2019-12-03 PROCEDURE — 99999 PR PBB SHADOW E&M-EST. PATIENT-LVL II: ICD-10-PCS | Mod: PBBFAC,HCNC,, | Performed by: STUDENT IN AN ORGANIZED HEALTH CARE EDUCATION/TRAINING PROGRAM

## 2019-12-03 PROCEDURE — 17000 PR DESTRUCTION(LASER SURGERY,CRYOSURGERY,CHEMOSURGERY),PREMALIGNANT LESIONS,FIRST LESION: ICD-10-PCS | Mod: HCNC,S$GLB,, | Performed by: STUDENT IN AN ORGANIZED HEALTH CARE EDUCATION/TRAINING PROGRAM

## 2019-12-03 PROCEDURE — 1101F PT FALLS ASSESS-DOCD LE1/YR: CPT | Mod: HCNC,CPTII,S$GLB, | Performed by: STUDENT IN AN ORGANIZED HEALTH CARE EDUCATION/TRAINING PROGRAM

## 2019-12-03 PROCEDURE — 99213 PR OFFICE/OUTPT VISIT, EST, LEVL III, 20-29 MIN: ICD-10-PCS | Mod: 25,HCNC,S$GLB, | Performed by: STUDENT IN AN ORGANIZED HEALTH CARE EDUCATION/TRAINING PROGRAM

## 2019-12-03 PROCEDURE — 87070 CULTURE OTHR SPECIMN AEROBIC: CPT | Mod: HCNC

## 2019-12-03 PROCEDURE — 17003 DESTRUCTION, PREMALIGNANT LESIONS; SECOND THROUGH 14 LESIONS: ICD-10-PCS | Mod: HCNC,S$GLB,, | Performed by: STUDENT IN AN ORGANIZED HEALTH CARE EDUCATION/TRAINING PROGRAM

## 2019-12-03 PROCEDURE — 87077 CULTURE AEROBIC IDENTIFY: CPT | Mod: HCNC

## 2019-12-03 PROCEDURE — 87186 SC STD MICRODIL/AGAR DIL: CPT | Mod: HCNC

## 2019-12-03 PROCEDURE — 1159F MED LIST DOCD IN RCRD: CPT | Mod: HCNC,S$GLB,, | Performed by: STUDENT IN AN ORGANIZED HEALTH CARE EDUCATION/TRAINING PROGRAM

## 2019-12-03 PROCEDURE — 1159F PR MEDICATION LIST DOCUMENTED IN MEDICAL RECORD: ICD-10-PCS | Mod: HCNC,S$GLB,, | Performed by: STUDENT IN AN ORGANIZED HEALTH CARE EDUCATION/TRAINING PROGRAM

## 2019-12-03 RX ORDER — MUPIROCIN 20 MG/G
OINTMENT TOPICAL 3 TIMES DAILY
Qty: 30 G | Refills: 1 | Status: SHIPPED | OUTPATIENT
Start: 2019-12-03 | End: 2022-01-01

## 2019-12-03 NOTE — PROGRESS NOTES
Subjective:       Patient ID:  Lavelle Ladd is a 64 y.o. male who presents for   Chief Complaint   Patient presents with    Skin Check     3 month      History of Present Illness: The patient presents for follow up of skin check. This is a high risk patient here to check for the development of new lesions. 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 Complains of several red scaly lesions on the ear and temples, but does not want a full body or upper body skin exam. Site from surgical area slowly healing, with yellowish material stuck to the graft. Denies any fever/chills.             Review of Systems   Skin: Negative for itching, rash and dry skin.        Objective:    Physical Exam   Constitutional: He appears well-developed and well-nourished. No distress.   Neurological: He is alert and oriented to person, place, and time. He is not disoriented.   Psychiatric: He has a normal mood and affect.   Skin:   Areas Examined (abnormalities noted in diagram):   Scalp / Hair Palpated and Inspected  Head / Face Inspection Performed  Neck Inspection Performed              Diagram Legend     Erythematous scaling macule/papule c/w actinic keratosis       Vascular papule c/w angioma      Pigmented verrucoid papule/plaque c/w seborrheic keratosis      Yellow umbilicated papule c/w sebaceous hyperplasia      Irregularly shaped tan macule c/w lentigo     1-2 mm smooth white papules consistent with Milia      Movable subcutaneous cyst with punctum c/w epidermal inclusion cyst      Subcutaneous movable cyst c/w pilar cyst      Firm pink to brown papule c/w dermatofibroma      Pedunculated fleshy papule(s) c/w skin tag(s)      Evenly pigmented macule c/w junctional nevus     Mildly variegated pigmented, slightly irregular-bordered macule c/w mildly atypical nevus      Flesh colored to evenly pigmented papule c/w intradermal nevus        Pink pearly papule/plaque c/w basal cell carcinoma      Erythematous hyperkeratotic cursted plaque c/w SCC      Surgical scar with no sign of skin cancer recurrence      Open and closed comedones      Inflammatory papules and pustules      Verrucoid papule consistent consistent with wart     Erythematous eczematous patches and plaques     Dystrophic onycholytic nail with subungual debris c/w onychomycosis     Umbilicated papule    Erythematous-base heme-crusted tan verrucoid plaque consistent with inflamed seborrheic keratosis     Erythematous Silvery Scaling Plaque c/w Psoriasis     See annotation      Assessment / Plan:        SCC (squamous cell carcinoma) and Actinic keratoses - patient declined UBSE. Discussed risks of not undergoing skin check and potential to miss any malignancy. Patient continued to decline.   -     mupirocin (BACTROBAN) 2 % ointment; Apply topically 3 (three) times daily.  Dispense: 30 g; Refill: 1    Cryosurgery Procedure Note    Verbal consent from the patient is obtained including, but not limited to, risk of hypopigmentation/hyperpigmentation, scar, recurrence of lesion. The patient is aware of the precancerous quality and need for treatment of these lesions. Liquid nitrogen cryosurgery is applied to the 6 actinic keratoses, as detailed in the physical exam, to produce a freeze injury. The patient is aware that blisters may form and is instructed on wound care with gentle cleansing and use of vaseline ointment to keep moist until healed. The patient is supplied a handout on cryosurgery and is instructed to call if lesions do not completely resolve.                 Follow up in about 8 weeks (around 1/28/2020).

## 2019-12-05 ENCOUNTER — TELEPHONE (OUTPATIENT)
Dept: DERMATOLOGY | Facility: CLINIC | Age: 64
End: 2019-12-05

## 2019-12-05 ENCOUNTER — PATIENT MESSAGE (OUTPATIENT)
Dept: DERMATOLOGY | Facility: CLINIC | Age: 64
End: 2019-12-05

## 2019-12-05 RX ORDER — DOXYCYCLINE 100 MG/1
100 CAPSULE ORAL EVERY 12 HOURS
Qty: 28 CAPSULE | Refills: 0 | Status: SHIPPED | OUTPATIENT
Start: 2019-12-05 | End: 2019-12-19

## 2019-12-06 ENCOUNTER — OFFICE VISIT (OUTPATIENT)
Dept: OTOLARYNGOLOGY | Facility: CLINIC | Age: 64
End: 2019-12-06
Payer: MEDICARE

## 2019-12-06 ENCOUNTER — OFFICE VISIT (OUTPATIENT)
Dept: RADIATION ONCOLOGY | Facility: CLINIC | Age: 64
End: 2019-12-06
Payer: MEDICARE

## 2019-12-06 VITALS
SYSTOLIC BLOOD PRESSURE: 127 MMHG | DIASTOLIC BLOOD PRESSURE: 79 MMHG | HEART RATE: 80 BPM | RESPIRATION RATE: 18 BRPM | HEIGHT: 71 IN | BODY MASS INDEX: 29.29 KG/M2 | TEMPERATURE: 98 F | WEIGHT: 209.19 LBS

## 2019-12-06 VITALS — SYSTOLIC BLOOD PRESSURE: 127 MMHG | DIASTOLIC BLOOD PRESSURE: 79 MMHG | HEART RATE: 80 BPM | TEMPERATURE: 99 F

## 2019-12-06 DIAGNOSIS — S01.80XA OPEN WOUND OF FACE, INITIAL ENCOUNTER: ICD-10-CM

## 2019-12-06 DIAGNOSIS — L58.9 RADIATION DERMATITIS: Primary | ICD-10-CM

## 2019-12-06 DIAGNOSIS — C44.42 SQUAMOUS CELL CARCINOMA, SCALP/NECK: Primary | ICD-10-CM

## 2019-12-06 DIAGNOSIS — S88.912S: ICD-10-CM

## 2019-12-06 PROCEDURE — 99999 PR PBB SHADOW E&M-EST. PATIENT-LVL V: ICD-10-PCS | Mod: PBBFAC,HCNC,, | Performed by: OTOLARYNGOLOGY

## 2019-12-06 PROCEDURE — 99024 POSTOP FOLLOW-UP VISIT: CPT | Mod: HCNC,S$GLB,, | Performed by: OTOLARYNGOLOGY

## 2019-12-06 PROCEDURE — 1101F PT FALLS ASSESS-DOCD LE1/YR: CPT | Mod: HCNC,CPTII,S$GLB, | Performed by: OTOLARYNGOLOGY

## 2019-12-06 PROCEDURE — 3074F PR MOST RECENT SYSTOLIC BLOOD PRESSURE < 130 MM HG: ICD-10-PCS | Mod: HCNC,CPTII,S$GLB, | Performed by: OTOLARYNGOLOGY

## 2019-12-06 PROCEDURE — 3078F PR MOST RECENT DIASTOLIC BLOOD PRESSURE < 80 MM HG: ICD-10-PCS | Mod: HCNC,CPTII,S$GLB, | Performed by: RADIOLOGY

## 2019-12-06 PROCEDURE — 99024 PR POST-OP FOLLOW-UP VISIT: ICD-10-PCS | Mod: HCNC,S$GLB,, | Performed by: OTOLARYNGOLOGY

## 2019-12-06 PROCEDURE — 99999 PR PBB SHADOW E&M-EST. PATIENT-LVL V: CPT | Mod: PBBFAC,HCNC,, | Performed by: OTOLARYNGOLOGY

## 2019-12-06 PROCEDURE — 1159F PR MEDICATION LIST DOCUMENTED IN MEDICAL RECORD: ICD-10-PCS | Mod: HCNC,S$GLB,, | Performed by: OTOLARYNGOLOGY

## 2019-12-06 PROCEDURE — 99999 PR PBB SHADOW E&M-EST. PATIENT-LVL III: ICD-10-PCS | Mod: PBBFAC,HCNC,, | Performed by: RADIOLOGY

## 2019-12-06 PROCEDURE — 1125F PR PAIN SEVERITY QUANTIFIED, PAIN PRESENT: ICD-10-PCS | Mod: HCNC,S$GLB,, | Performed by: RADIOLOGY

## 2019-12-06 PROCEDURE — 99999 PR PBB SHADOW E&M-EST. PATIENT-LVL III: CPT | Mod: PBBFAC,HCNC,, | Performed by: RADIOLOGY

## 2019-12-06 PROCEDURE — 3078F PR MOST RECENT DIASTOLIC BLOOD PRESSURE < 80 MM HG: ICD-10-PCS | Mod: HCNC,CPTII,S$GLB, | Performed by: OTOLARYNGOLOGY

## 2019-12-06 PROCEDURE — 3074F SYST BP LT 130 MM HG: CPT | Mod: HCNC,CPTII,S$GLB, | Performed by: OTOLARYNGOLOGY

## 2019-12-06 PROCEDURE — 1101F PR PT FALLS ASSESS DOC 0-1 FALLS W/OUT INJ PAST YR: ICD-10-PCS | Mod: HCNC,CPTII,S$GLB, | Performed by: OTOLARYNGOLOGY

## 2019-12-06 PROCEDURE — 3074F PR MOST RECENT SYSTOLIC BLOOD PRESSURE < 130 MM HG: ICD-10-PCS | Mod: HCNC,CPTII,S$GLB, | Performed by: RADIOLOGY

## 2019-12-06 PROCEDURE — 3078F DIAST BP <80 MM HG: CPT | Mod: HCNC,CPTII,S$GLB, | Performed by: OTOLARYNGOLOGY

## 2019-12-06 PROCEDURE — 1159F MED LIST DOCD IN RCRD: CPT | Mod: HCNC,S$GLB,, | Performed by: OTOLARYNGOLOGY

## 2019-12-06 PROCEDURE — 99213 PR OFFICE/OUTPT VISIT, EST, LEVL III, 20-29 MIN: ICD-10-PCS | Mod: HCNC,S$GLB,, | Performed by: RADIOLOGY

## 2019-12-06 PROCEDURE — 1159F MED LIST DOCD IN RCRD: CPT | Mod: HCNC,S$GLB,, | Performed by: RADIOLOGY

## 2019-12-06 PROCEDURE — 1159F PR MEDICATION LIST DOCUMENTED IN MEDICAL RECORD: ICD-10-PCS | Mod: HCNC,S$GLB,, | Performed by: RADIOLOGY

## 2019-12-06 PROCEDURE — 99213 OFFICE O/P EST LOW 20 MIN: CPT | Mod: HCNC,S$GLB,, | Performed by: RADIOLOGY

## 2019-12-06 PROCEDURE — 1101F PR PT FALLS ASSESS DOC 0-1 FALLS W/OUT INJ PAST YR: ICD-10-PCS | Mod: HCNC,CPTII,S$GLB, | Performed by: RADIOLOGY

## 2019-12-06 PROCEDURE — 3074F SYST BP LT 130 MM HG: CPT | Mod: HCNC,CPTII,S$GLB, | Performed by: RADIOLOGY

## 2019-12-06 PROCEDURE — 1125F AMNT PAIN NOTED PAIN PRSNT: CPT | Mod: HCNC,S$GLB,, | Performed by: RADIOLOGY

## 2019-12-06 PROCEDURE — 3078F DIAST BP <80 MM HG: CPT | Mod: HCNC,CPTII,S$GLB, | Performed by: RADIOLOGY

## 2019-12-06 PROCEDURE — 1101F PT FALLS ASSESS-DOCD LE1/YR: CPT | Mod: HCNC,CPTII,S$GLB, | Performed by: RADIOLOGY

## 2019-12-06 NOTE — PROGRESS NOTES
HEAD AND NECK SURGICAL ONCOLOGY CLINIC NOTE    CC: F/U cutaneous SCC    TREATMENT HISTORY:  1. WLE temple SCC/ACell [id number] - 9/27/2019  2. FTSG temple 10/7/2019  3. RT completed 11/27/2019     INTERVAL HISTORY:Lavelle Ladd returns to the [facility]cology Clinic for follow-up of SCC. No complaints today.Denies dysphagia, odynophagia, throat pain and otalgia.Voice is stable. Does not experience dry mouth. Denies fevers, chills, and nightsweats. His daughter has noticed some additional cutaneous lesions near the primary site as well as on an ear. He was seen by dermatology 3 days ago and underwent cryotherapy of 6 AKs. He has significant radiation dermatitis and is having difficulty changing his dressings and caring for the wound by himself. Was recently diagnosed with a staph infection and is on Bactrim Rx by his dermatologist.    Exam:  RT dermatitis to L temple, vaseline gauze in place  No overt cellulitis    A/P: We will contact the radiation oncologists as he will need to be seen [facility] for management of his radiation dermatitis. Since Dr. Peacock is no longer at [facility], if rad/onc care is unavailable I will try to arrange a visit to the wound care clinic. In the interim I will see if we can arrange home health for wound care.

## 2019-12-06 NOTE — PROGRESS NOTES
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 8/15/19 shave biopsy returned squamous cell carcinoma.  2. 2019 he had an ST-elevation MI.  3. 19 wide local excision left temple with circumferential advancement flap.  Pathology invasive squamous cell carcinoma, keratinizing measuring 6.5 cm, completely excised with negative margins.  No lymphovascular or perineural invasion.  Moderately differentiated.  Tumor was 0.4 mm from deep margin.  It was widely excised from peripheral margins.  4. 50Gy/20fx to L temple completed 19    CURRENT STATUS: Lavelle Ladd is a  64 y.o. male who presents today for follow-up.  He was sent from ENT today. He states he had been keeping his wound uncovered and using Aquaphor since completing treatment (he was intermittently using Miaderm and Aquaphor during treatment, not every day). He notes that since he had started using the antibiotic ointment and dressing the wound daily it has improved. He thinks it is looking better. He complains of mild intermittent pain in the area and occasional pruritis.     PHYSICAL EXAMINATION:  Constitutional/Vitals: well appearing, no acute distress, ECOG 2 - Ambulates, capable of self care only, /79   Pulse 80   Temp 98.2 °F (36.8 °C)   Resp 18   Ht 5' 11" (1.803 m)   Wt 94.9 kg (209 lb 3.5 oz)   BMI 29.18 kg/m²   ENT:  Left temporal circular flap with brisk erythema and moist desquamation surrounding, notable with some purulence around auricle. Skin islands and granulation tissue present. No overt bleeding noted.  Lymphatic: no cervical, supraclavicular adenopathy        IMAGING AND LABORATORY FINDINGS: None new.    ASSESSMENT:  Radiation dermatitis as expected with likely superinfection    PLAN:  Mr. Wilders skin appears as expected in the " proper timeframe for a hypofractionated course delivered to the skin (the regimen he received can have worse skin toxicity after treatment completes). He likely has some superinfection in some areas. His skin will continue to heal as time progresses. He is recommended to use wound cleanser (given today), antibiotic ointment, followed by vaseline bandage/gauze to cover his wound.   He can see Dr. Rodríguez in ~2 weeks (one week earlier than previously scheduled).     Darell Sandoval M.D.  Radiation Oncology  [facility] [address] [place] [address]  Ph: [phone]

## 2019-12-07 LAB — BACTERIA SPEC AEROBE CULT: ABNORMAL

## 2019-12-07 PROCEDURE — G0180 PR HOME HEALTH MD CERTIFICATION: ICD-10-PCS | Mod: ,,, | Performed by: OTOLARYNGOLOGY

## 2019-12-07 PROCEDURE — G0180 MD CERTIFICATION HHA PATIENT: HCPCS | Mod: ,,, | Performed by: OTOLARYNGOLOGY

## 2019-12-11 ENCOUNTER — OFFICE VISIT (OUTPATIENT)
Dept: CARDIOLOGY | Facility: CLINIC | Age: 64
End: 2019-12-11
Payer: MEDICARE

## 2019-12-11 VITALS
HEIGHT: 71 IN | DIASTOLIC BLOOD PRESSURE: 70 MMHG | OXYGEN SATURATION: 98 % | HEART RATE: 87 BPM | BODY MASS INDEX: 29.18 KG/M2 | SYSTOLIC BLOOD PRESSURE: 130 MMHG

## 2019-12-11 DIAGNOSIS — N18.30 STAGE 3 CHRONIC KIDNEY DISEASE: ICD-10-CM

## 2019-12-11 DIAGNOSIS — I73.9 PERIPHERAL VASCULAR DISEASE: ICD-10-CM

## 2019-12-11 DIAGNOSIS — R07.89 OTHER CHEST PAIN: ICD-10-CM

## 2019-12-11 DIAGNOSIS — I25.118 CORONARY ARTERY DISEASE OF NATIVE ARTERY OF NATIVE HEART WITH STABLE ANGINA PECTORIS: Primary | ICD-10-CM

## 2019-12-11 DIAGNOSIS — E11.65 TYPE 2 DIABETES MELLITUS WITH HYPERGLYCEMIA, WITH LONG-TERM CURRENT USE OF INSULIN: ICD-10-CM

## 2019-12-11 DIAGNOSIS — E11.311 TYPE 2 DIABETES MELLITUS WITH RIGHT EYE AFFECTED BY RETINOPATHY AND MACULAR EDEMA, WITH LONG-TERM CURRENT USE OF INSULIN, UNSPECIFIED RETINOPATHY SEVERITY: ICD-10-CM

## 2019-12-11 DIAGNOSIS — Z79.4 TYPE 2 DIABETES MELLITUS WITH HYPERGLYCEMIA, WITH LONG-TERM CURRENT USE OF INSULIN: ICD-10-CM

## 2019-12-11 DIAGNOSIS — I12.9 RENAL HYPERTENSION: Chronic | ICD-10-CM

## 2019-12-11 DIAGNOSIS — I10 ESSENTIAL HYPERTENSION: ICD-10-CM

## 2019-12-11 DIAGNOSIS — E78.2 MIXED HYPERLIPIDEMIA: ICD-10-CM

## 2019-12-11 DIAGNOSIS — Z79.4 TYPE 2 DIABETES MELLITUS WITH RIGHT EYE AFFECTED BY RETINOPATHY AND MACULAR EDEMA, WITH LONG-TERM CURRENT USE OF INSULIN, UNSPECIFIED RETINOPATHY SEVERITY: ICD-10-CM

## 2019-12-11 DIAGNOSIS — J44.9 CHRONIC OBSTRUCTIVE PULMONARY DISEASE, UNSPECIFIED COPD TYPE: ICD-10-CM

## 2019-12-11 PROCEDURE — 1159F PR MEDICATION LIST DOCUMENTED IN MEDICAL RECORD: ICD-10-PCS | Mod: HCNC,S$GLB,, | Performed by: INTERNAL MEDICINE

## 2019-12-11 PROCEDURE — 1125F PR PAIN SEVERITY QUANTIFIED, PAIN PRESENT: ICD-10-PCS | Mod: HCNC,S$GLB,, | Performed by: INTERNAL MEDICINE

## 2019-12-11 PROCEDURE — 3075F SYST BP GE 130 - 139MM HG: CPT | Mod: HCNC,CPTII,S$GLB, | Performed by: INTERNAL MEDICINE

## 2019-12-11 PROCEDURE — 1101F PT FALLS ASSESS-DOCD LE1/YR: CPT | Mod: HCNC,CPTII,S$GLB, | Performed by: INTERNAL MEDICINE

## 2019-12-11 PROCEDURE — 3044F HG A1C LEVEL LT 7.0%: CPT | Mod: HCNC,CPTII,S$GLB, | Performed by: INTERNAL MEDICINE

## 2019-12-11 PROCEDURE — 99999 PR PBB SHADOW E&M-EST. PATIENT-LVL III: ICD-10-PCS | Mod: PBBFAC,HCNC,, | Performed by: INTERNAL MEDICINE

## 2019-12-11 PROCEDURE — 3075F PR MOST RECENT SYSTOLIC BLOOD PRESS GE 130-139MM HG: ICD-10-PCS | Mod: HCNC,CPTII,S$GLB, | Performed by: INTERNAL MEDICINE

## 2019-12-11 PROCEDURE — 3078F PR MOST RECENT DIASTOLIC BLOOD PRESSURE < 80 MM HG: ICD-10-PCS | Mod: HCNC,CPTII,S$GLB, | Performed by: INTERNAL MEDICINE

## 2019-12-11 PROCEDURE — 99999 PR PBB SHADOW E&M-EST. PATIENT-LVL III: CPT | Mod: PBBFAC,HCNC,, | Performed by: INTERNAL MEDICINE

## 2019-12-11 PROCEDURE — 99499 UNLISTED E&M SERVICE: CPT | Mod: HCNC,S$GLB,, | Performed by: INTERNAL MEDICINE

## 2019-12-11 PROCEDURE — 99215 PR OFFICE/OUTPT VISIT, EST, LEVL V, 40-54 MIN: ICD-10-PCS | Mod: HCNC,S$GLB,, | Performed by: INTERNAL MEDICINE

## 2019-12-11 PROCEDURE — 1101F PR PT FALLS ASSESS DOC 0-1 FALLS W/OUT INJ PAST YR: ICD-10-PCS | Mod: HCNC,CPTII,S$GLB, | Performed by: INTERNAL MEDICINE

## 2019-12-11 PROCEDURE — 99215 OFFICE O/P EST HI 40 MIN: CPT | Mod: HCNC,S$GLB,, | Performed by: INTERNAL MEDICINE

## 2019-12-11 PROCEDURE — 1159F MED LIST DOCD IN RCRD: CPT | Mod: HCNC,S$GLB,, | Performed by: INTERNAL MEDICINE

## 2019-12-11 PROCEDURE — 3078F DIAST BP <80 MM HG: CPT | Mod: HCNC,CPTII,S$GLB, | Performed by: INTERNAL MEDICINE

## 2019-12-11 PROCEDURE — 1125F AMNT PAIN NOTED PAIN PRSNT: CPT | Mod: HCNC,S$GLB,, | Performed by: INTERNAL MEDICINE

## 2019-12-11 PROCEDURE — 99499 RISK ADDL DX/OHS AUDIT: ICD-10-PCS | Mod: HCNC,S$GLB,, | Performed by: INTERNAL MEDICINE

## 2019-12-11 PROCEDURE — 3044F PR MOST RECENT HEMOGLOBIN A1C LEVEL <7.0%: ICD-10-PCS | Mod: HCNC,CPTII,S$GLB, | Performed by: INTERNAL MEDICINE

## 2019-12-11 RX ORDER — NITROGLYCERIN 0.4 MG/1
0.4 TABLET SUBLINGUAL EVERY 5 MIN PRN
Qty: 30 TABLET | Refills: 0 | Status: SHIPPED | OUTPATIENT
Start: 2019-12-11 | End: 2020-04-22 | Stop reason: SDUPTHER

## 2019-12-11 NOTE — PROGRESS NOTES
Subjective:   Patient ID:  Lavelle Ladd is a 64 y.o. male who presents for cardiac consult of No chief complaint on file.      HPI  The patient came in today for cardiac consult of No chief complaint on file.        Lavelle Ladd is a 64 y.o. male pt with CAD s/p mult PCI, Pt has h/o CRI, DM, HTN, HLP, PAD, right BKA, transmetatarsal amputation of left foot, renal transplant in May 2016, CAD and immunosuppressed post transplant.     8/5/19 visit with Amol Alas, pt of Dr. Valdez  He presents to clinic today for hospital follow up. Presented to [facility] on 7/21/19 with acute anterolateral and inferior STEMI. Cath showed occluded apical LAD, diffuse non obstructive CAD elsewhere. Medically managed.  Patient hydrated with IVF the day after angiogram due to bump in creatine to 1.7. No ACEI ordered upon DC.     9/11/19  Pt is here for pre-op CV eval - scheduled for surgery (Skin excision) for skin cancer left temple on 9/27/19 with . Last [facility] 7/19 medically managed post STEMI. No CP/SOB.     12/11/19  He is s/p surgery and 20 rounds of radiation. He has been having more nausea, and has ear ache along with SOB and L arm pain. He has been having low blood sugars.   He has been getting wound care at home with PT/OT. He has constant dull type aching pain. He has taken pain meds, takes Norco.     Patient feels no chest pain, no sob, no leg swelling, no PND, no palpitation, no dizziness, no syncope, no CNS symptoms.    Patient is compliant with medications.    CONCLUSIONS     1 - Normal left ventricular systolic function (EF 60-65%).     2 - Indeterminate LV diastolic function.     3 - Normal right ventricular systolic function .     4 - Wall motion abnormalities.     5 - Moderate left atrial enlargement.       This document has been electronically    SIGNED BY: Andrew Valdez MD On: 07/21/2019 11:21    7/21/19 [facility]  1. Routine post-cath care.  2. Recommend maximal medical management for apical LAD occlusion (small  tortuous vessel and very distal occlusion).   3. Risk factor reductions.  4. ASA 81mg.  5. Clopidogrel (Plavix) for one year.  6. Maximize medical tx for PAD.    Past Medical History:   Diagnosis Date    DINORAH (acute kidney injury) 2016    Arthritis     CAD in native artery 2019    CHF (congestive heart failure)     Chronic obstructive pulmonary disease 2016    Coronary artery disease involving native coronary artery of native heart without angina pectoris 2016    CRI (chronic renal insufficiency) 2019     donor kidney transplant for DM 16     Induction with Thymo x3 and IV solumedrol to total 875mg  Kidney Biopsy  2016: 16 glomeruli, ACR type 1 AVR type 2, significant microcirculatory changes, c4d negative, No DSA, 5 to10% fibrosis. Treated with thymo x8 2016- no rejection      Diastolic heart failure     Encounter for blood transfusion     ESRD on RRT since 10/2013 10/29/2013    Biopsy proven diabetic nephropathy and lymphoplasmacytic interstitial infiltrate not c/w with AIN (ddx sjogrens or assoc with tamm-horsefall protein extravasation)     GERD (gastroesophageal reflux disease)     History of hepatitis C, s/p successful Rx w/ SVR12 - 2017    Completed 12 weeks harvoni w/ SVR    Hyperlipidemia     Hypertension     PAD (peripheral artery disease) 2019    PIC line (peripherally inserted central catheter) flush     Prophylactic immunotherapy     Proteinuria     PVD (peripheral vascular disease) 2017    RLE BKA CT 16 Extensive atherosclerotic disease of the aorta and mesenteric arteries.     Renal hypertension     Type 2 diabetes mellitus with diabetic neuropathy, with long-term current use of insulin 2016    Vitamin B12 deficiency        Past Surgical History:   Procedure Laterality Date    AORTOGRAPHY WITH SERIALOGRAPHY N/A 2018    Procedure: LEFT LEG ANGIOGRAM;  Surgeon: Donal Mcdonald MD;  Location[facility] CATH  LAB;  Service: Vascular;  Laterality: N/A;    av bovine graft      Left UE    AV FISTULA PLACEMENT      left UE    CARDIAC CATHETERIZATION  02/2015    CLOSURE OF WOUND Left 9/24/2018    Procedure: CLOSURE, WOUND;  Surgeon: Karla Wheeler DPM;  Location: [facility] OR;  Service: Podiatry;  Laterality: Left;  Secondary Wound closure, extensive    CLOSURE OF WOUND Left 11/5/2018    Procedure: CLOSURE, WOUND;  Surgeon: Karla Wheeler DPM;  Location: [facility] OR;  Service: Podiatry;  Laterality: Left;    DEBRIDEMENT OF MULTIPLE METATARSAL BONES Left 11/5/2018    Procedure: DEBRIDEMENT, METATARSAL BONE, 2 OR MORE BONES;  Surgeon: Karla Wheeler DPM;  Location: [facility] OR;  Service: Podiatry;  Laterality: Left;    EXCISION OF SKIN Left 9/27/2019    Procedure: EXCISION, SKIN;  Surgeon: Lenard Alarcon MD;  Location: [address];  Service: Plastics;  Laterality: Left;  Plastics set, NIMS monitor, [employer]    FOOT AMPUTATION THROUGH METATARSAL Left 9/21/2018    Procedure: AMPUTATION, FOOT, TRANSMETATARSAL;  Surgeon: Karla Wheeler DPM;  Location: [facility] OR;  Service: Podiatry;  Laterality: Left;    FOOT AMPUTATION THROUGH METATARSAL Left 10/31/2018    Procedure: AMPUTATION, FOOT, TRANSMETATARSAL;  Surgeon: Karla Wheeler DPM;  Location: [facility] OR;  Service: Podiatry;  Laterality: Left;    FOOT AMPUTATION THROUGH METATARSAL Left 11/5/2018    Procedure: AMPUTATION, FOOT, TRANSMETATARSAL;  Surgeon: Karla Wheeler DPM;  Location: [facility] OR;  Service: Podiatry;  Laterality: Left;  revisional transmetatarsal amputation, Left foot    IRRIGATION AND DEBRIDEMENT OF LOWER EXTREMITY Left 10/31/2018    Procedure: IRRIGATION AND DEBRIDEMENT, LOWER EXTREMITY;  Surgeon: Karla Wheeler DPM;  Location: [facility] OR;  Service: Podiatry;  Laterality: Left;    KIDNEY TRANSPLANT  05/21/2016    LEFT HEART CATHETERIZATION Left 7/21/2019    Procedure: CATHETERIZATION, HEART, LEFT;  Surgeon: Andrew Valdez MD;  Location:  [facility] CATH LAB;  Service: Cardiology;  Laterality: Left;    LEG AMPUTATION THROUGH KNEE      right LE, started as nail puncture leading to diabetic ulcer    SKIN FULL THICKNESS GRAFT Left 10/7/2019    Procedure: APPLICATION, GRAFT, SKIN, FULL-THICKNESS;  Surgeon: Lenard Alarcon MD;  Location: [facility] OR [address] [place] [address];  Service: Plastics;  Laterality: Left;    SURGICAL REMOVAL OF LESION OF FACE Right 10/7/2019    Procedure: EXCISION, LESION, FACE;  Surgeon: Lenard Alarcon MD;  Location: [facility] OR [address] [place] [address];  Service: Plastics;  Laterality: Right;       Social History     Tobacco Use    Smoking status: Former Smoker     Packs/day: 1.00     Years: 40.00     Pack years: 40.00     Last attempt to quit: 2013     Years since quittin.9    Smokeless tobacco: Never Used   Substance Use Topics    Alcohol use: Yes     Alcohol/week: 6.0 standard drinks     Types: 6 Cans of beer per week     Comment: seldom    Drug use: No       Family History   Problem Relation Age of Onset    Cancer Father     Diabetes Father     Heart failure Father     Stroke Father     Heart failure Mother     Kidney disease Neg Hx        Patient's Medications   New Prescriptions    No medications on file   Previous Medications    AMLODIPINE (NORVASC) 10 MG TABLET    Take 1 tablet (10 mg total) by mouth once daily.    ASPIRIN (ECOTRIN) 81 MG EC TABLET    Take 1 tablet (81 mg total) by mouth once daily.    ATORVASTATIN (LIPITOR) 80 MG TABLET    Take 1 tablet (80 mg total) by mouth once daily.    BD INSULIN SYRINGE ULTRA-FINE 1 ML 31 GAUGE X 5/16 SYRG    USE ONE AS DIRECTED FOUR TIMES DAILY WITH MEALS AND AT BEDTIME    BLOOD SUGAR DIAGNOSTIC STRP    1 each by Misc.(Non-Drug; Combo Route) route 3 (three) times daily.    BLOOD-GLUCOSE METER KIT    Use as instructed    CLOPIDOGREL (PLAVIX) 75 MG TABLET    Take 1 tablet (75 mg total) by mouth once daily.    DICLOFENAC (FLECTOR) 1.3 % PT12    Apply 1 packet topically once daily.    DOXYCYCLINE  "(VIBRAMYCIN) 100 MG CAP    Take 1 capsule (100 mg total) by mouth every 12 (twelve) hours. for 14 days    ERGOCALCIFEROL (ERGOCALCIFEROL) 50,000 UNIT CAP    Take 1 capsule (50,000 Units total) by mouth every 7 days. Take on Mondays    FLASH GLUCOSE SCANNING READER (FREESTYLE BRYAN 14 DAY READER) MISC    1 Device by Misc.(Non-Drug; Combo Route) route as needed.    FLASH GLUCOSE SENSOR (FREESTYLE BRYAN 14 DAY SENSOR) KIT    1 kit by Misc.(Non-Drug; Combo Route) route every 14 (fourteen) days.    GABAPENTIN (NEURONTIN) 300 MG CAPSULE    Take 1 capsule (300 mg total) by mouth 3 (three) times daily. for 10 days    HYDROCODONE-ACETAMINOPHEN (NORCO)  MG PER TABLET    1 tablet by Per G Tube route every 6 (six) hours as needed for Pain.    ISOSORBIDE MONONITRATE (IMDUR) 30 MG 24 HR TABLET    Take 1 tablet (30 mg total) by mouth once daily.    LIRAGLUTIDE 0.6 MG/0.1 ML, 18 MG/3 ML, SUBQ PNIJ (VICTOZA 2-KOKI) 0.6 MG/0.1 ML (18 MG/3 ML) PNIJ    Inject 1.8 mg into the skin once daily.    METOPROLOL TARTRATE (LOPRESSOR) 25 MG TABLET    Take 1 tablet (25 mg total) by mouth 2 (two) times daily.    MUPIROCIN (BACTROBAN) 2 % OINTMENT    Apply topically 3 (three) times daily.    MUPIROCIN CALCIUM 2% (BACTROBAN) 2 % CREAM    Apply topically 3 (three) times daily.    NALOXONE (NARCAN) 4 MG/ACTUATION SPRY    1 spray by Nasal route once.    ONDANSETRON (ZOFRAN-ODT) 4 MG TBDL    Take 1 tablet (4 mg total) by mouth every 8 (eight) hours as needed.    PEN NEEDLE, DIABETIC (SURE-FINE PEN NEEDLES) 31 GAUGE X 3/16" NDLE    1 each by Misc.(Non-Drug; Combo Route) route 4 (four) times daily with meals and nightly.    PEN NEEDLE, DIABETIC 32 GAUGE X 5/32" NDLE    1 each by Misc.(Non-Drug; Combo Route) route 4 (four) times daily. [place]    PREDNISONE (DELTASONE) 5 MG TABLET    Take 1 tablet (5 mg total) by mouth once daily.    SODIUM BICARBONATE 650 MG TABLET    Take 4 tablets (2,600 mg total) by mouth 2 (two) times daily.    TACROLIMUS " "(PROGRAF) 0.5 MG CAP    Take 3 capsules (1.5 mg total) by mouth every 12 (twelve) hours.    TRESIBA FLEXTOUCH U-100 100 UNIT/ML (3 ML) INPN    Inject 30 Units into the skin 2 (two) times daily.   Modified Medications    No medications on file   Discontinued Medications    No medications on file       Review of Systems   Constitutional: Positive for malaise/fatigue.   HENT: Negative.    Eyes: Negative.    Respiratory: Negative.    Cardiovascular: Positive for chest pain.   Gastrointestinal: Negative.    Genitourinary: Negative.    Musculoskeletal: Positive for myalgias and neck pain.   Skin: Negative.    Neurological: Negative.    Endo/Heme/Allergies: Negative.    Psychiatric/Behavioral: Negative.    All 12 systems otherwise negative.      Wt Readings from Last 3 Encounters:   12/06/19 94.9 kg (209 lb 3.5 oz)   10/21/19 94.9 kg (209 lb 4.8 oz)   10/14/19 98.4 kg (217 lb)     Temp Readings from Last 3 Encounters:   12/06/19 98.2 °F (36.8 °C)   12/06/19 98.5 °F (36.9 °C) (Tympanic)   10/21/19 97 °F (36.1 °C)     BP Readings from Last 3 Encounters:   12/11/19 130/70   12/06/19 127/79   12/06/19 127/79     Pulse Readings from Last 3 Encounters:   12/11/19 87   12/06/19 80   12/06/19 80       /70 (BP Location: Right arm, Patient Position: Sitting, BP Method: Medium (Manual))   Pulse 87   Ht 5' 11" (1.803 m)   SpO2 98%   BMI 29.18 kg/m²     Objective:   Physical Exam   Constitutional: He is oriented to person, place, and time. He appears well-developed and well-nourished. No distress.   HENT:   Head: Normocephalic and atraumatic.   Nose: Nose normal.   Mouth/Throat: Oropharynx is clear and moist.   Eyes: Conjunctivae and EOM are normal. No scleral icterus.   Neck: Normal range of motion. Neck supple. No JVD present. No thyromegaly present.   Cardiovascular: Normal rate, regular rhythm, S1 normal and S2 normal. Exam reveals no gallop, no S3, no S4 and no friction rub.   Murmur heard.  Pulmonary/Chest: Effort " normal and breath sounds normal. No stridor. No respiratory distress. He has no wheezes. He has no rales. He exhibits no tenderness.   Abdominal: Soft. Bowel sounds are normal. He exhibits no distension and no mass. There is no tenderness. There is no rebound.   Genitourinary:   Genitourinary Comments: Deferred   Musculoskeletal: Normal range of motion. He exhibits no edema, tenderness or deformity.   Lymphadenopathy:     He has no cervical adenopathy.   Neurological: He is alert and oriented to person, place, and time. He exhibits normal muscle tone. Coordination normal.   Skin: Skin is warm and dry. No rash noted. He is not diaphoretic. No erythema. No pallor.   Psychiatric: He has a normal mood and affect. His behavior is normal. Judgment and thought content normal.   Nursing note and vitals reviewed.      Lab Results   Component Value Date     (L) 09/18/2019    K 4.1 09/18/2019     09/18/2019    CO2 20 (L) 09/18/2019    BUN 18 09/18/2019    CREATININE 1.7 (H) 09/18/2019     (H) 09/18/2019    HGBA1C 6.5 (H) 07/21/2019    MG 1.7 09/18/2019    AST 8 (L) 09/18/2019    ALT 12 09/18/2019    ALBUMIN 3.0 (L) 09/18/2019    PROT 7.2 09/18/2019    BILITOT 0.5 09/18/2019    WBC 8.12 09/18/2019    HGB 13.8 (L) 09/18/2019    HCT 42.1 09/18/2019    MCV 89 09/18/2019     09/18/2019    INR 1.0 07/24/2019    TSH 0.909 07/21/2019    CHOL 123 07/21/2019    HDL 45 07/21/2019    LDLCALC 59.4 (L) 07/21/2019    TRIG 93 07/21/2019     (H) 09/18/2018     Assessment:      1. Coronary artery disease of native artery of native heart with stable angina pectoris    2. Essential hypertension    3. Renal hypertension    4. Peripheral vascular disease    5. Mixed hyperlipidemia    6. Chronic obstructive pulmonary disease, unspecified COPD type    7. 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 Stage  3 chronic kidney disease    9. Other chest pain        Plan:     1. HTN  - cont meds    2. CAD with CP  - r/o ischemia with pharm nuclear stress test, pt cannot walk on treadmill due to weakness  - cont asa, statin, bb, plavix, imdur  - PRN NTG    3.HLD  - cont statin    4. CKD  - cont to monitor    5. DM2  - cont meds per PCP    Follow up with Dr. Valdez or Amol Interiano    Thank you for allowing me to participate in this patient's care. Please do not hesitate to contact me with any questions or concerns. Consult note has been forwarded to the referral physician.     Michael Contreras MD, [facility]  Cardiovascular Disease  [facility], [place]  [phone] (P)

## 2019-12-13 ENCOUNTER — EXTERNAL HOME HEALTH (OUTPATIENT)
Dept: HOME HEALTH SERVICES | Facility: HOSPITAL | Age: 64
End: 2019-12-13
Payer: MEDICARE

## 2019-12-13 ENCOUNTER — TELEPHONE (OUTPATIENT)
Dept: HOME HEALTH SERVICES | Facility: HOSPITAL | Age: 64
End: 2019-12-13

## 2019-12-14 ENCOUNTER — NURSE TRIAGE (OUTPATIENT)
Dept: ADMINISTRATIVE | Facility: CLINIC | Age: 64
End: 2019-12-14

## 2019-12-14 ENCOUNTER — HOSPITAL ENCOUNTER (EMERGENCY)
Facility: HOSPITAL | Age: 64
Discharge: HOME | End: 2019-12-14
Attending: EMERGENCY MEDICINE
Payer: MEDICARE

## 2019-12-14 VITALS
HEIGHT: 71 IN | DIASTOLIC BLOOD PRESSURE: 89 MMHG | HEART RATE: 85 BPM | BODY MASS INDEX: 30.35 KG/M2 | WEIGHT: 216.81 LBS | OXYGEN SATURATION: 95 % | RESPIRATION RATE: 20 BRPM | TEMPERATURE: 98 F | SYSTOLIC BLOOD PRESSURE: 148 MMHG

## 2019-12-14 DIAGNOSIS — R06.02 SOB (SHORTNESS OF BREATH): Primary | ICD-10-CM

## 2019-12-14 DIAGNOSIS — Z94.0 RENAL TRANSPLANT, STATUS POST: ICD-10-CM

## 2019-12-14 PROBLEM — R06.09 DOE (DYSPNEA ON EXERTION): Status: ACTIVE | Noted: 2019-12-14

## 2019-12-14 PROBLEM — I44.1 MOBITZ TYPE 1 SECOND DEGREE ATRIOVENTRICULAR BLOCK: Status: ACTIVE | Noted: 2019-12-14

## 2019-12-14 PROBLEM — I25.2 OLD MI (MYOCARDIAL INFARCTION): Status: ACTIVE | Noted: 2019-12-14

## 2019-12-14 PROBLEM — R07.89 ATYPICAL CHEST PAIN: Status: ACTIVE | Noted: 2019-12-14

## 2019-12-14 LAB
ALBUMIN SERPL BCP-MCNC: 3.4 G/DL (ref 3.5–5.2)
ALP SERPL-CCNC: 83 U/L (ref 55–135)
ALT SERPL W/O P-5'-P-CCNC: 17 U/L (ref 10–44)
ANION GAP SERPL CALC-SCNC: 12 MMOL/L (ref 8–16)
APTT BLDCRRT: 21.4 SEC (ref 21–32)
AST SERPL-CCNC: 29 U/L (ref 10–40)
BACTERIA #/AREA URNS HPF: NORMAL /HPF
BASOPHILS # BLD AUTO: 0.02 K/UL (ref 0–0.2)
BASOPHILS NFR BLD: 0.3 % (ref 0–1.9)
BILIRUB SERPL-MCNC: 0.7 MG/DL (ref 0.1–1)
BILIRUB UR QL STRIP: NEGATIVE
BNP SERPL-MCNC: 113 PG/ML (ref 0–99)
BUN SERPL-MCNC: 20 MG/DL (ref 8–23)
CALCIUM SERPL-MCNC: 9.3 MG/DL (ref 8.7–10.5)
CHLORIDE SERPL-SCNC: 104 MMOL/L (ref 95–110)
CLARITY UR: CLEAR
CO2 SERPL-SCNC: 20 MMOL/L (ref 23–29)
COLOR UR: YELLOW
CREAT SERPL-MCNC: 1.6 MG/DL (ref 0.5–1.4)
DIFFERENTIAL METHOD: ABNORMAL
EOSINOPHIL # BLD AUTO: 0.1 K/UL (ref 0–0.5)
EOSINOPHIL NFR BLD: 0.7 % (ref 0–8)
ERYTHROCYTE [DISTWIDTH] IN BLOOD BY AUTOMATED COUNT: 13.2 % (ref 11.5–14.5)
EST. GFR  (AFRICAN AMERICAN): 51 ML/MIN/1.73 M^2
EST. GFR  (NON AFRICAN AMERICAN): 44 ML/MIN/1.73 M^2
GLUCOSE SERPL-MCNC: 153 MG/DL (ref 70–110)
GLUCOSE UR QL STRIP: NEGATIVE
HCT VFR BLD AUTO: 43 % (ref 40–54)
HGB BLD-MCNC: 13.5 G/DL (ref 14–18)
HGB UR QL STRIP: NEGATIVE
HYALINE CASTS #/AREA URNS LPF: 0 /LPF
IMM GRANULOCYTES # BLD AUTO: 0.02 K/UL (ref 0–0.04)
IMM GRANULOCYTES NFR BLD AUTO: 0.3 % (ref 0–0.5)
INR PPP: 0.9 (ref 0.8–1.2)
KETONES UR QL STRIP: NEGATIVE
LEUKOCYTE ESTERASE UR QL STRIP: NEGATIVE
LYMPHOCYTES # BLD AUTO: 1.6 K/UL (ref 1–4.8)
LYMPHOCYTES NFR BLD: 23.2 % (ref 18–48)
MAGNESIUM SERPL-MCNC: 1.6 MG/DL (ref 1.6–2.6)
MCH RBC QN AUTO: 28.5 PG (ref 27–31)
MCHC RBC AUTO-ENTMCNC: 31.4 G/DL (ref 32–36)
MCV RBC AUTO: 91 FL (ref 82–98)
MICROSCOPIC COMMENT: NORMAL
MONOCYTES # BLD AUTO: 0.6 K/UL (ref 0.3–1)
MONOCYTES NFR BLD: 8.9 % (ref 4–15)
NEUTROPHILS # BLD AUTO: 4.6 K/UL (ref 1.8–7.7)
NEUTROPHILS NFR BLD: 66.6 % (ref 38–73)
NITRITE UR QL STRIP: NEGATIVE
NRBC BLD-RTO: 0 /100 WBC
PH UR STRIP: 6 [PH] (ref 5–8)
PHOSPHATE SERPL-MCNC: 3.5 MG/DL (ref 2.7–4.5)
PLATELET # BLD AUTO: 257 K/UL (ref 150–350)
PMV BLD AUTO: 9.7 FL (ref 9.2–12.9)
POTASSIUM SERPL-SCNC: 5 MMOL/L (ref 3.5–5.1)
PROT SERPL-MCNC: 7.7 G/DL (ref 6–8.4)
PROT UR QL STRIP: ABNORMAL
PROTHROMBIN TIME: 10.3 SEC (ref 9–12.5)
RBC # BLD AUTO: 4.73 M/UL (ref 4.6–6.2)
RBC #/AREA URNS HPF: 2 /HPF (ref 0–4)
SODIUM SERPL-SCNC: 136 MMOL/L (ref 136–145)
SP GR UR STRIP: 1.02 (ref 1–1.03)
TROPONIN I SERPL DL<=0.01 NG/ML-MCNC: 0.01 NG/ML (ref 0–0.03)
URN SPEC COLLECT METH UR: ABNORMAL
UROBILINOGEN UR STRIP-ACNC: NEGATIVE EU/DL
WBC # BLD AUTO: 6.89 K/UL (ref 3.9–12.7)
WBC #/AREA URNS HPF: 2 /HPF (ref 0–5)

## 2019-12-14 PROCEDURE — 36415 COLL VENOUS BLD VENIPUNCTURE: CPT | Mod: HCNC

## 2019-12-14 PROCEDURE — 99284 PR EMERGENCY DEPT VISIT,LEVEL IV: ICD-10-PCS | Mod: HCNC,,, | Performed by: INTERNAL MEDICINE

## 2019-12-14 PROCEDURE — 85610 PROTHROMBIN TIME: CPT | Mod: HCNC

## 2019-12-14 PROCEDURE — 93010 EKG 12-LEAD: ICD-10-PCS | Mod: HCNC,,, | Performed by: INTERNAL MEDICINE

## 2019-12-14 PROCEDURE — 99284 EMERGENCY DEPT VISIT MOD MDM: CPT | Mod: HCNC,,, | Performed by: INTERNAL MEDICINE

## 2019-12-14 PROCEDURE — 85025 COMPLETE CBC W/AUTO DIFF WBC: CPT | Mod: HCNC

## 2019-12-14 PROCEDURE — 83880 ASSAY OF NATRIURETIC PEPTIDE: CPT | Mod: HCNC

## 2019-12-14 PROCEDURE — 85730 THROMBOPLASTIN TIME PARTIAL: CPT | Mod: HCNC

## 2019-12-14 PROCEDURE — 84484 ASSAY OF TROPONIN QUANT: CPT | Mod: HCNC

## 2019-12-14 PROCEDURE — 81000 URINALYSIS NONAUTO W/SCOPE: CPT | Mod: HCNC

## 2019-12-14 PROCEDURE — 99285 EMERGENCY DEPT VISIT HI MDM: CPT | Mod: 25,HCNC

## 2019-12-14 PROCEDURE — 83735 ASSAY OF MAGNESIUM: CPT | Mod: HCNC

## 2019-12-14 PROCEDURE — 84100 ASSAY OF PHOSPHORUS: CPT | Mod: HCNC

## 2019-12-14 PROCEDURE — 93005 ELECTROCARDIOGRAM TRACING: CPT | Mod: HCNC

## 2019-12-14 PROCEDURE — 80053 COMPREHEN METABOLIC PANEL: CPT | Mod: HCNC

## 2019-12-14 PROCEDURE — 93010 ELECTROCARDIOGRAM REPORT: CPT | Mod: HCNC,,, | Performed by: INTERNAL MEDICINE

## 2019-12-14 RX ORDER — ISOSORBIDE MONONITRATE 30 MG/1
60 TABLET, EXTENDED RELEASE ORAL DAILY
Qty: 60 TABLET | Refills: 11 | Status: ON HOLD | OUTPATIENT
Start: 2019-12-14 | End: 2022-01-01 | Stop reason: HOSPADM

## 2019-12-14 NOTE — SUBJECTIVE & OBJECTIVE
Past Medical History:   Diagnosis Date    DINORAH (acute kidney injury) 2016    Arthritis     CAD in native artery 2019    CHF (congestive heart failure)     Chronic obstructive pulmonary disease 2016    Coronary artery disease involving native coronary artery of native heart without angina pectoris 2016    CRI (chronic renal insufficiency) 2019     donor kidney transplant for DM 16     Induction with Thymo x3 and IV solumedrol to total 875mg  Kidney Biopsy  2016: 16 glomeruli, ACR type 1 AVR type 2, significant microcirculatory changes, c4d negative, No DSA, 5 to10% fibrosis. Treated with thymo x8 2016- no rejection      Diastolic heart failure     Encounter for blood transfusion     ESRD on RRT since 10/2013 10/29/2013    Biopsy proven diabetic nephropathy and lymphoplasmacytic interstitial infiltrate not c/w with AIN (ddx sjogrens or assoc with tamm-horsefall protein extravasation)     GERD (gastroesophageal reflux disease)     History of hepatitis C, s/p successful Rx w/ SVR12 - 2017    Completed 12 weeks harvoni w/ SVR    Hyperlipidemia     Hypertension     PAD (peripheral artery disease) 2019    PIC line (peripherally inserted central catheter) flush     Prophylactic immunotherapy     Proteinuria     PVD (peripheral vascular disease) 2017    RLE BKA CT 16 Extensive atherosclerotic disease of the aorta and mesenteric arteries.     Renal hypertension     Type 2 diabetes mellitus with diabetic neuropathy, with long-term current use of insulin 2016    Vitamin B12 deficiency        Past Surgical History:   Procedure Laterality Date    AORTOGRAPHY WITH SERIALOGRAPHY N/A 2018    Procedure: LEFT LEG ANGIOGRAM;  Surgeon: Donal Mcdonald MD;  Location: [facility] CATH LAB;  Service: Vascular;  Laterality: N/A;    av bovine graft      Left UE    AV FISTULA PLACEMENT      left UE    CARDIAC CATHETERIZATION  2015     CLOSURE OF WOUND Left 9/24/2018    Procedure: CLOSURE, WOUND;  Surgeon: Karla Wheeler DPM;  Location: [facility] OR;  Service: Podiatry;  Laterality: Left;  Secondary Wound closure, extensive    CLOSURE OF WOUND Left 11/5/2018    Procedure: CLOSURE, WOUND;  Surgeon: Karla Wheeler DPM;  Location: [facility] OR;  Service: Podiatry;  Laterality: Left;    DEBRIDEMENT OF MULTIPLE METATARSAL BONES Left 11/5/2018    Procedure: DEBRIDEMENT, METATARSAL BONE, 2 OR MORE BONES;  Surgeon: Karla Wheeler DPM;  Location: [facility] OR;  Service: Podiatry;  Laterality: Left;    EXCISION OF SKIN Left 9/27/2019    Procedure: EXCISION, SKIN;  Surgeon: Lenard Alarcon MD;  Location: [address];  Service: Plastics;  Laterality: Left;  Plastics set, NIMS monitor, [employer]    FOOT AMPUTATION THROUGH METATARSAL Left 9/21/2018    Procedure: AMPUTATION, FOOT, TRANSMETATARSAL;  Surgeon: Karla Wheeler DPM;  Location: [facility] OR;  Service: Podiatry;  Laterality: Left;    FOOT AMPUTATION THROUGH METATARSAL Left 10/31/2018    Procedure: AMPUTATION, FOOT, TRANSMETATARSAL;  Surgeon: Kalra Wheeler DPM;  Location: [facility] OR;  Service: Podiatry;  Laterality: Left;    FOOT AMPUTATION THROUGH METATARSAL Left 11/5/2018    Procedure: AMPUTATION, FOOT, TRANSMETATARSAL;  Surgeon: Karla Wheeler DPM;  Location: [facility] OR;  Service: Podiatry;  Laterality: Left;  revisional transmetatarsal amputation, Left foot    IRRIGATION AND DEBRIDEMENT OF LOWER EXTREMITY Left 10/31/2018    Procedure: IRRIGATION AND DEBRIDEMENT, LOWER EXTREMITY;  Surgeon: Karla Wheeler DPM;  Location: [facility] OR;  Service: Podiatry;  Laterality: Left;    KIDNEY TRANSPLANT  05/21/2016    LEFT HEART CATHETERIZATION Left 7/21/2019    Procedure: CATHETERIZATION, HEART, LEFT;  Surgeon: Andrew Valdez MD;  Location: [facility] CATH LAB;  Service: Cardiology;  Laterality: Left;    LEG AMPUTATION THROUGH KNEE  2011    right LE, started as nail puncture leading to diabetic ulcer     SKIN FULL THICKNESS GRAFT Left 10/7/2019    Procedure: APPLICATION, GRAFT, SKIN, FULL-THICKNESS;  Surgeon: Lenard Alarcon MD;  Location: [facility] OR [address] [place] [address];  Service: Plastics;  Laterality: Left;    SURGICAL REMOVAL OF LESION OF FACE Right 10/7/2019    Procedure: EXCISION, LESION, FACE;  Surgeon: Lenard Alarcon MD;  Location: [facility] OR [address] [place] [address];  Service: Plastics;  Laterality: Right;       Review of patient's allergies indicates:  No Known Allergies    No current facility-administered medications on file prior to encounter.      Current Outpatient Medications on File Prior to Encounter   Medication Sig    amLODIPine (NORVASC) 10 MG tablet Take 1 tablet (10 mg total) by mouth once daily.    aspirin (ECOTRIN) 81 MG EC tablet Take 1 tablet (81 mg total) by mouth once daily.    atorvastatin (LIPITOR) 80 MG tablet Take 1 tablet (80 mg total) by mouth once daily.    BD INSULIN SYRINGE ULTRA-FINE 1 mL 31 gauge x 5/16 Syrg USE ONE AS DIRECTED FOUR TIMES DAILY WITH MEALS AND AT BEDTIME    blood sugar diagnostic Strp 1 each by Misc.(Non-Drug; Combo Route) route 3 (three) times daily.    blood-glucose meter kit Use as instructed    clopidogrel (PLAVIX) 75 mg tablet Take 1 tablet (75 mg total) by mouth once daily.    diclofenac (FLECTOR) 1.3 % PT12 Apply 1 packet topically once daily.    doxycycline (VIBRAMYCIN) 100 MG Cap Take 1 capsule (100 mg total) by mouth every 12 (twelve) hours. for 14 days    flash glucose scanning reader (FREESTYLE BRYAN 14 DAY READER) Misc 1 Device by Misc.(Non-Drug; Combo Route) route as needed.    flash glucose sensor (FREESTYLE BRYAN 14 DAY SENSOR) Kit 1 kit by Misc.(Non-Drug; Combo Route) route every 14 (fourteen) days.    gabapentin (NEURONTIN) 300 MG capsule Take 1 capsule (300 mg total) by mouth 3 (three) times daily. for 10 days    HYDROcodone-acetaminophen (NORCO)  mg per tablet 1 tablet by Per G Tube route every 6 (six) hours as needed for Pain.    liraglutide 0.6  "mg/0.1 mL, 18 mg/3 mL, subq PNIJ (VICTOZA 2-KOKI) 0.6 mg/0.1 mL (18 mg/3 mL) PnIj Inject 1.8 mg into the skin once daily.    metoprolol tartrate (LOPRESSOR) 25 MG tablet Take 1 tablet (25 mg total) by mouth 2 (two) times daily.    mupirocin (BACTROBAN) 2 % ointment Apply topically 3 (three) times daily.    mupirocin calcium 2% (BACTROBAN) 2 % cream Apply topically 3 (three) times daily.    pen needle, diabetic (SURE-FINE PEN NEEDLES) 31 gauge x 3/16" Ndle 1 each by Misc.(Non-Drug; Combo Route) route 4 (four) times daily with meals and nightly.    pen needle, diabetic 32 gauge x 5/32" Ndle 1 each by Misc.(Non-Drug; Combo Route) route 4 (four) times daily. Urszula Needles    predniSONE (DELTASONE) 5 MG tablet Take 1 tablet (5 mg total) by mouth once daily.    tacrolimus (PROGRAF) 0.5 MG Cap Take 3 capsules (1.5 mg total) by mouth every 12 (twelve) hours.    TRESIBA FLEXTOUCH U-100 100 unit/mL (3 mL) InPn Inject 30 Units into the skin 2 (two) times daily.    [DISCONTINUED] isosorbide mononitrate (IMDUR) 30 MG 24 hr tablet Take 1 tablet (30 mg total) by mouth once daily.    ergocalciferol (ERGOCALCIFEROL) 50,000 unit Cap Take 1 capsule (50,000 Units total) by mouth every 7 days. Take on     naloxone (NARCAN) 4 mg/actuation Spry 1 spray by Nasal route once.    nitroGLYCERIN (NITROSTAT) 0.4 MG SL tablet Place 1 tablet (0.4 mg total) under the tongue every 5 (five) minutes as needed.    ondansetron (ZOFRAN-ODT) 4 MG TbDL Take 1 tablet (4 mg total) by mouth every 8 (eight) hours as needed.    sodium bicarbonate 650 MG tablet Take 4 tablets (2,600 mg total) by mouth 2 (two) times daily.     Family History     Problem Relation (Age of Onset)    Cancer Father    Diabetes Father    Heart failure Father, Mother    Stroke Father        Tobacco Use    Smoking status: Former Smoker     Packs/day: 1.00     Years: 40.00     Pack years: 40.00     Last attempt to quit: 2013     Years since quittin.9    " Smokeless tobacco: Never Used   Substance and Sexual Activity    Alcohol use: Yes     Alcohol/week: 6.0 standard drinks     Types: 6 Cans of beer per week     Comment: seldom    Drug use: No    Sexual activity: Never     Review of Systems   Constitution: Positive for malaise/fatigue.   HENT: Negative.    Eyes: Negative.    Cardiovascular: Positive for chest pain and dyspnea on exertion.   Respiratory: Positive for shortness of breath.    Endocrine: Negative.    Hematologic/Lymphatic: Negative.    Skin: Negative.    Musculoskeletal: Negative.    Gastrointestinal: Negative.    Genitourinary: Negative.    Neurological: Negative.    Psychiatric/Behavioral: Negative.    Allergic/Immunologic: Negative.      Objective:     Vital Signs (Most Recent):  Temp: 97.7 °F (36.5 °C) (12/14/19 1031)  Pulse: 85 (12/14/19 1330)  Resp: 20 (12/14/19 1330)  BP: (!) 148/89 (12/14/19 1330)  SpO2: 95 % (12/14/19 1330) Vital Signs (24h Range):  Temp:  [97.7 °F (36.5 °C)] 97.7 °F (36.5 °C)  Pulse:  [79-85] 85  Resp:  [19-20] 20  SpO2:  [95 %-100 %] 95 %  BP: (121-148)/(80-90) 148/89     Weight: 98.3 kg (216 lb 12.8 oz)  Body mass index is 30.24 kg/m².    SpO2: 95 %  O2 Device (Oxygen Therapy): room air    No intake or output data in the 24 hours ending 12/14/19 1403    Lines/Drains/Airways     None                 Physical Exam   Constitutional: He is oriented to person, place, and time. He appears well-developed and well-nourished.   HENT:   Head: Normocephalic.   Neck: Normal range of motion. Neck supple. Normal carotid pulses, no hepatojugular reflux and no JVD present. Carotid bruit is not present. No thyromegaly present.   Cardiovascular: Normal rate, regular rhythm, S1 normal and S2 normal. PMI is not displaced. Exam reveals no S3, no S4, no distant heart sounds, no friction rub, no midsystolic click and no opening snap.   No murmur heard.  Pulses:       Radial pulses are 2+ on the right side, and 2+ on the left side.    Pulmonary/Chest: Effort normal and breath sounds normal. He has no wheezes. He has no rales.   Abdominal: Soft. Bowel sounds are normal. He exhibits no distension, no abdominal bruit, no ascites and no mass. There is no tenderness.   Musculoskeletal: He exhibits no edema.   Neurological: He is alert and oriented to person, place, and time.   Skin: Skin is warm.   Psychiatric: He has a normal mood and affect. His behavior is normal.   Nursing note and vitals reviewed.      Significant Labs:   BMP:   Recent Labs   Lab 12/14/19  1109   *      K 5.0      CO2 20*   BUN 20   CREATININE 1.6*   CALCIUM 9.3   MG 1.6   , CMP   Recent Labs   Lab 12/14/19  1109      K 5.0      CO2 20*   *   BUN 20   CREATININE 1.6*   CALCIUM 9.3   PROT 7.7   ALBUMIN 3.4*   BILITOT 0.7   ALKPHOS 83   AST 29   ALT 17   ANIONGAP 12   ESTGFRAFRICA 51*   EGFRNONAA 44*   , CBC   Recent Labs   Lab 12/14/19  1109   WBC 6.89   HGB 13.5*   HCT 43.0      , INR   Recent Labs   Lab 12/14/19  1109   INR 0.9    and Troponin   Recent Labs   Lab 12/14/19  1109   TROPONINI 0.015       Significant Imaging: Echocardiogram:   2D echo with color flow doppler:   Results for orders placed or performed during the hospital encounter of 07/21/19   2D echo with color flow doppler   Result Value Ref Range    QEF 60 55 - 65    Narrative    Date of Procedure: 07/21/2019        TEST DESCRIPTION   Technical Quality: This is a technically challenging study. There is poor endocardial definition.     Aorta: The aortic root is normal in size, measuring 2.9 cm at sinotubular junction and 3.6 cm at Sinuses of Valsalva. The proximal ascending aorta is normal in size, measuring 2.7 cm across.     Left Atrium: The left atrial volume index is moderately enlarged, measuring 45.81 cc/m2.     Left Ventricle: The left ventricle is normal in size, with an end-diastolic diameter of 4.0 cm, and an end-systolic diameter of 2.4 cm. Posterior wall  thickness is mildly increased, with the septum measuring 1.2 cm and the posterior wall measuring 1.4 cm   across. Relative wall thickness was increased at 0.70, and the LV mass index was 101.8 g/m2 consistent with concentric remodeling. The apex is akinetic.   Left ventricular systolic function appears normal. Visually estimated ejection fraction is 60-65%. The LV Doppler derived stroke volume equals 66.0 ccs.     Diastolic indices: E/e' ratio(avg) 10. Diastolic function is indeterminate.     Right Atrium: The right atrium is normal in size, measuring 5.5 cm in length and 2.8 cm in width in the apical view.     Right Ventricle: The right ventricle is normal in size. Global right ventricular systolic function appears normal.     Aortic Valve:  The aortic valve is normal in structure. The peak velocity obtained across the aortic valve is 1.06 m/s, which translates to a peak gradient of 4 mmHg. The mean gradient is 3 mmHg. Using a left ventricular outflow tract diameter of 2.4 cm,   a left ventricular outflow tract velocity time integral of 15 cm, and a peak instantaneous transvalvular velocity time integral of 19 cm, the calculated aortic valve area is 3.59 cm2(AVAi is 1.67 cm2/m2).     Mitral Valve:  The mitral valve is normal in structure.     Tricuspid Valve:  The tricuspid valve is normal in structure.     Pulmonary Valve:  The pulmonic valve is not well seen.     IVC: IVC is normal in size and collapses > 50% with a sniff, suggesting normal right atrial pressure of 3 mmHg.     Intracavitary: There is no evidence of pericardial effusion, intracavity mass, thrombi, or vegetation.         CONCLUSIONS     1 - Normal left ventricular systolic function (EF 60-65%).     2 - Indeterminate LV diastolic function.     3 - Normal right ventricular systolic function .     4 - Wall motion abnormalities.     5 - Moderate left atrial enlargement.             This document has been electronically    SIGNED BY: Andrew Valdez MD On:  07/21/2019 11:21

## 2019-12-14 NOTE — ASSESSMENT & PLAN NOTE
Creatinine is 1.6 no fluid overload.  Continue current conservative management with immunosuppressive therapy

## 2019-12-14 NOTE — HPI
"Pt presents to ER with c/o dyspnea and fatigue.  Pt known to Cardiology service.    S/p anterolateral STEMI 7/19 with LHC performed and showed apical LAD occlusion, diffuse nonobstructive LAD disease, normal EF.  Optimal medical mgt advised.  He has h/o renal transplant, PAD, HTN.  States he felt like he needed dialysis again recently with increased CAMARA and fatigue.  No recurrent angina like his MI pain.  Has a "twinge" of cp, occasionally, lasts seconds and resolves.  Has not had to take sl ntg.  In ER labs are stable, negative troponin, .  ECG shows sinus with wenckebach.  No dizziness or syncope.  No acute st-t abnormalities noted.  "

## 2019-12-14 NOTE — SUBJECTIVE & OBJECTIVE
Past Medical History:   Diagnosis Date    DINORAH (acute kidney injury) 2016    Arthritis     CAD in native artery 2019    CHF (congestive heart failure)     Chronic obstructive pulmonary disease 2016    Coronary artery disease involving native coronary artery of native heart without angina pectoris 2016    CRI (chronic renal insufficiency) 2019     donor kidney transplant for DM 16     Induction with Thymo x3 and IV solumedrol to total 875mg  Kidney Biopsy  2016: 16 glomeruli, ACR type 1 AVR type 2, significant microcirculatory changes, c4d negative, No DSA, 5 to10% fibrosis. Treated with thymo x8 2016- no rejection      Diastolic heart failure     Encounter for blood transfusion     ESRD on RRT since 10/2013 10/29/2013    Biopsy proven diabetic nephropathy and lymphoplasmacytic interstitial infiltrate not c/w with AIN (ddx sjogrens or assoc with tamm-horsefall protein extravasation)     GERD (gastroesophageal reflux disease)     History of hepatitis C, s/p successful Rx w/ SVR12 - 2017    Completed 12 weeks harvoni w/ SVR    Hyperlipidemia     Hypertension     PAD (peripheral artery disease) 2019    PIC line (peripherally inserted central catheter) flush     Prophylactic immunotherapy     Proteinuria     PVD (peripheral vascular disease) 2017    RLE BKA CT 16 Extensive atherosclerotic disease of the aorta and mesenteric arteries.     Renal hypertension     Type 2 diabetes mellitus with diabetic neuropathy, with long-term current use of insulin 2016    Vitamin B12 deficiency        Past Surgical History:   Procedure Laterality Date    AORTOGRAPHY WITH SERIALOGRAPHY N/A 2018    Procedure: LEFT LEG ANGIOGRAM;  Surgeon: Donal Mcdonald MD;  Location: [facility] CATH LAB;  Service: Vascular;  Laterality: N/A;    av bovine graft      Left UE    AV FISTULA PLACEMENT      left UE    CARDIAC CATHETERIZATION  2015     CLOSURE OF WOUND Left 9/24/2018    Procedure: CLOSURE, WOUND;  Surgeon: Karla Wheeler DPM;  Location: [facility] OR;  Service: Podiatry;  Laterality: Left;  Secondary Wound closure, extensive    CLOSURE OF WOUND Left 11/5/2018    Procedure: CLOSURE, WOUND;  Surgeon: Karla Wheeler DPM;  Location: [facility] OR;  Service: Podiatry;  Laterality: Left;    DEBRIDEMENT OF MULTIPLE METATARSAL BONES Left 11/5/2018    Procedure: DEBRIDEMENT, METATARSAL BONE, 2 OR MORE BONES;  Surgeon: Karla Wheeler DPM;  Location: [facility] OR;  Service: Podiatry;  Laterality: Left;    EXCISION OF SKIN Left 9/27/2019    Procedure: EXCISION, SKIN;  Surgeon: Lenard Alarcon MD;  Location: [address];  Service: Plastics;  Laterality: Left;  Plastics set, NIMS monitor, [employer]    FOOT AMPUTATION THROUGH METATARSAL Left 9/21/2018    Procedure: AMPUTATION, FOOT, TRANSMETATARSAL;  Surgeon: Karla Wheeler DPM;  Location: [facility] OR;  Service: Podiatry;  Laterality: Left;    FOOT AMPUTATION THROUGH METATARSAL Left 10/31/2018    Procedure: AMPUTATION, FOOT, TRANSMETATARSAL;  Surgeon: Karla Wheeler DPM;  Location: [facility] OR;  Service: Podiatry;  Laterality: Left;    FOOT AMPUTATION THROUGH METATARSAL Left 11/5/2018    Procedure: AMPUTATION, FOOT, TRANSMETATARSAL;  Surgeon: Karla Wheeler DPM;  Location: [facility] OR;  Service: Podiatry;  Laterality: Left;  revisional transmetatarsal amputation, Left foot    IRRIGATION AND DEBRIDEMENT OF LOWER EXTREMITY Left 10/31/2018    Procedure: IRRIGATION AND DEBRIDEMENT, LOWER EXTREMITY;  Surgeon: Karla Wheeler DPM;  Location: [facility] OR;  Service: Podiatry;  Laterality: Left;    KIDNEY TRANSPLANT  05/21/2016    LEFT HEART CATHETERIZATION Left 7/21/2019    Procedure: CATHETERIZATION, HEART, LEFT;  Surgeon: Andrew Valdez MD;  Location: [facility] CATH LAB;  Service: Cardiology;  Laterality: Left;    LEG AMPUTATION THROUGH KNEE  2011    right LE, started as nail puncture leading to diabetic ulcer  "   SKIN FULL THICKNESS GRAFT Left 10/7/2019    Procedure: APPLICATION, GRAFT, SKIN, FULL-THICKNESS;  Surgeon: Lenard Alarcon MD;  Location: [facility] OR 2ND FLR;  Service: Plastics;  Laterality: Left;    SURGICAL REMOVAL OF LESION OF FACE Right 10/7/2019    Procedure: EXCISION, LESION, FACE;  Surgeon: Leanrd Alarcon MD;  Location: [facility] OR 2ND FLR;  Service: Plastics;  Laterality: Right;       Review of patient's allergies indicates:  No Known Allergies  No current facility-administered medications for this encounter.      Current Outpatient Medications   Medication    amLODIPine (NORVASC) 10 MG tablet    aspirin (ECOTRIN) 81 MG EC tablet    atorvastatin (LIPITOR) 80 MG tablet    BD INSULIN SYRINGE ULTRA-FINE 1 mL 31 gauge x 5/16 Syrg    blood sugar diagnostic Strp    blood-glucose meter kit    clopidogrel (PLAVIX) 75 mg tablet    diclofenac (FLECTOR) 1.3 % PT12    doxycycline (VIBRAMYCIN) 100 MG Cap    flash glucose scanning reader (FREESTYLE BRYAN 14 DAY READER) Misc    flash glucose sensor (FREESTYLE BRYAN 14 DAY SENSOR) Kit    gabapentin (NEURONTIN) 300 MG capsule    HYDROcodone-acetaminophen (NORCO)  mg per tablet    isosorbide mononitrate (IMDUR) 30 MG 24 hr tablet    liraglutide 0.6 mg/0.1 mL, 18 mg/3 mL, subq PNIJ (VICTOZA 2-KOKI) 0.6 mg/0.1 mL (18 mg/3 mL) PnIj    metoprolol tartrate (LOPRESSOR) 25 MG tablet    mupirocin (BACTROBAN) 2 % ointment    mupirocin calcium 2% (BACTROBAN) 2 % cream    pen needle, diabetic (SURE-FINE PEN NEEDLES) 31 gauge x 3/16" Ndle    pen needle, diabetic 32 gauge x 5/32" Ndle    predniSONE (DELTASONE) 5 MG tablet    tacrolimus (PROGRAF) 0.5 MG Cap    TRESIBA FLEXTOUCH U-100 100 unit/mL (3 mL) InPn    ergocalciferol (ERGOCALCIFEROL) 50,000 unit Cap    naloxone (NARCAN) 4 mg/actuation Spry    nitroGLYCERIN (NITROSTAT) 0.4 MG SL tablet    ondansetron (ZOFRAN-ODT) 4 MG TbDL    sodium bicarbonate 650 MG tablet     Family History     Problem Relation " (Age of Onset)    Cancer Father    Diabetes Father    Heart failure Father, Mother    Stroke Father        Tobacco Use    Smoking status: Former Smoker     Packs/day: 1.00     Years: 40.00     Pack years: 40.00     Last attempt to quit: 2013     Years since quittin.9    Smokeless tobacco: Never Used   Substance and Sexual Activity    Alcohol use: Yes     Alcohol/week: 6.0 standard drinks     Types: 6 Cans of beer per week     Comment: seldom    Drug use: No    Sexual activity: Never     Review of Systems   Constitutional: Negative.  Negative for activity change, appetite change, chills, diaphoresis, fatigue and fever.   HENT: Negative.  Negative for congestion and trouble swallowing.    Eyes: Negative.    Respiratory: Positive for shortness of breath. Negative for cough, chest tightness and wheezing.    Cardiovascular: Negative.  Negative for chest pain, palpitations and leg swelling.   Gastrointestinal: Negative.  Negative for abdominal distention, abdominal pain, nausea and vomiting.   Genitourinary: Negative.  Negative for decreased urine volume, difficulty urinating, dysuria, enuresis, flank pain, frequency, hematuria, penile swelling, scrotal swelling and urgency.   Musculoskeletal: Negative.  Negative for arthralgias, back pain, joint swelling and neck pain.   Skin: Negative for rash.   Neurological: Negative.  Negative for tremors, seizures and headaches.   Psychiatric/Behavioral: Negative.  Negative for confusion and sleep disturbance. The patient is not nervous/anxious.    All other systems reviewed and are negative.    Objective:     Vital Signs (Most Recent):  Temp: 97.7 °F (36.5 °C) (19 1031)  Pulse: 79 (19 1050)  Resp: 20 (19 1050)  BP: 132/84 (19 1050)  SpO2: 100 % (19 1050)  O2 Device (Oxygen Therapy): room air (19 1031) Vital Signs (24h Range):  Temp:  [97.7 °F (36.5 °C)] 97.7 °F (36.5 °C)  Pulse:  [79-84] 79  Resp:  [20] 20  SpO2:  [98 %-100 %] 100  %  BP: (121-132)/(80-84) 132/84     Weight: 98.3 kg (216 lb 12.8 oz) (12/14/19 1046)  Body mass index is 30.24 kg/m².  Body surface area is 2.22 meters squared.    No intake/output data recorded.    Physical Exam   Constitutional: He is oriented to person, place, and time. He appears well-developed and well-nourished. No distress.   HENT:   Head: Normocephalic and atraumatic.   Eyes: Pupils are equal, round, and reactive to light.   Neck: Normal range of motion. Neck supple. No tracheal deviation present. No thyromegaly present.   Cardiovascular: Normal rate, regular rhythm, normal heart sounds and intact distal pulses. Exam reveals no gallop and no friction rub.   No murmur heard.  Pulmonary/Chest: Effort normal and breath sounds normal. He has no wheezes. He has no rales.   Abdominal: Soft. He exhibits no mass. There is no tenderness. There is no rebound and no guarding.   No allograft tenderness    Musculoskeletal: Normal range of motion. He exhibits no edema.   S/p right BKA   s/p left transmetatarsal amputation   Lymphadenopathy:     He has no cervical adenopathy.   Neurological: He is alert and oriented to person, place, and time.   Skin: Skin is warm. Capillary refill takes less than 2 seconds. No rash noted. He is not diaphoretic. No erythema.   Left-sided scalp showing large tumor exfoliating covered by dressing   Psychiatric: He has a normal mood and affect. His behavior is normal. Judgment and thought content normal.   Nursing note and vitals reviewed.    Point of care ultrasound was performed to evaluate abnormal breathing pattern, volume status, cardiac status, evaluation for shortness of breath.    Patient was in supine position    Pulmonary ultrasound was evaluated in multiple sites including anterior chest bilaterally, A lines were positive.  B lines were negative. No evidence of pneumothorax.  No Pleural effusion detected   no atelectasis or pneumonia.      Point of care ultrasound of the heart  shows no evidence of pericardial effusion.  Normal LV and RV function.  Normal valvular heart  function.    Point of care ultrasound of the inferior vena cava shows distended inferior vena cava patient was asked to sniff which shows no fluid overload ad and CVP of about 5-10          Significant Labs:  Cardiac Markers: No results for input(s): CKMB, TROPONINT, MYOGLOBIN in the last 168 hours.  CBC:   Recent Labs   Lab 12/14/19  1109   WBC 6.89   RBC 4.73   HGB 13.5*   HCT 43.0      MCV 91   MCH 28.5   MCHC 31.4*     CMP:   Recent Labs   Lab 12/14/19  1109   *   CALCIUM 9.3   ALBUMIN 3.4*   PROT 7.7      K 5.0   CO2 20*      BUN 20   CREATININE 1.6*   ALKPHOS 83   ALT 17   AST 29   BILITOT 0.7     All labs within the past 24 hours have been reviewed.    Significant Imaging:  X-Ray: Reviewed

## 2019-12-14 NOTE — ED PROVIDER NOTES
"SCRIBE #1 NOTE: I, Lauri Padmini, am scribing for, and in the presence of, Charito Oleary MD. I have scribed the entire note.      History      Chief Complaint   Patient presents with    General Illness     pt reports has a kidney transplant 4-5 years ago and is starting to feel "like i did before i got put on dialysis" Pt c/o SOB for over a week in which he saw a cardiloigst for on Wednesday and increased urination, Pt vague about s/s just states "i feel like crap and needs my labs checked"        Review of patient's allergies indicates:  No Known Allergies     HPI   HPI    12/14/2019, 10:41 AM   History obtained from the patient      History of Present Illness: Lavelle Ladd is a 64 y.o. male patient with a PMHx of CAD, CHF, ESRD, GERD, HTN, PAD, and DM type II who presents to the Emergency Department for SOB which onset gradually one week ago. Pt states he had a kidney transplant 4-5 year ago and states he called the transplant center where he was advised to go to the ED. Pt states "he is not on dialysis but feels like before I got on dialysis." Symptoms are constant and moderate in severity. No mitigating or exacerbating factors reported. Associated sxs include SOB, CP, and nausea. Patient denies any fever, cough, abd pain, abd distention, wheezing, difficulty urinating, urgency, frequency, and all other sxs at this time. Prior Tx included. Pt states he had an MI in October. 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 Pt states he saw his cardiologist on Wed and has a nuclear Stress Test scheduled next week.Pt's cardiologist is Dr. Contreras. Pts nephrologist is Dr. Rose. No further complaints or concerns at this time.         Arrival mode: Personal vehicle     PCP: Rishabh Esteban MD       Past Medical History:  Past Medical History:   Diagnosis Date    DINORAH (acute kidney injury) 9/25/2016    Arthritis     CAD in native artery 7/21/2019    CHF (congestive heart " failure)     Chronic obstructive pulmonary disease 2016    Coronary artery disease involving native coronary artery of native heart without angina pectoris 2016    CRI (chronic renal insufficiency) 2019     donor kidney transplant for DM 16     Induction with Thymo x3 and IV solumedrol to total 875mg  Kidney Biopsy  2016: 16 glomeruli, ACR type 1 AVR type 2, significant microcirculatory changes, c4d negative, No DSA, 5 to10% fibrosis. Treated with thymo x8 2016- no rejection      Diastolic heart failure     Encounter for blood transfusion     ESRD on RRT since 10/2013 10/29/2013    Biopsy proven diabetic nephropathy and lymphoplasmacytic interstitial infiltrate not c/w with AIN (ddx sjogrens or assoc with tamm-horsefall protein extravasation)     GERD (gastroesophageal reflux disease)     History of hepatitis C, s/p successful Rx w/ SVR12 - 2017    Completed 12 weeks harvoni w/ SVR    Hyperlipidemia     Hypertension     PAD (peripheral artery disease) 2019    PIC line (peripherally inserted central catheter) flush     Prophylactic immunotherapy     Proteinuria     PVD (peripheral vascular disease) 2017    RLE BKA CT 16 Extensive atherosclerotic disease of the aorta and mesenteric arteries.     Renal hypertension     Type 2 diabetes mellitus with diabetic neuropathy, with long-term current use of insulin 2016    Vitamin B12 deficiency        Past Surgical History:  Past Surgical History:   Procedure Laterality Date    AORTOGRAPHY WITH SERIALOGRAPHY N/A 2018    Procedure: LEFT LEG ANGIOGRAM;  Surgeon: Donal Mcdonald MD;  Location: [facility] CATH LAB;  Service: Vascular;  Laterality: N/A;    av bovine graft      Left UE    AV FISTULA PLACEMENT      left UE    CARDIAC CATHETERIZATION  2015    CLOSURE OF WOUND Left 2018    Procedure: CLOSURE, WOUND;  Surgeon: Karla Wheeler DPM;  Location: [facility] OR;  Service: Podiatry;   Laterality: Left;  Secondary Wound closure, extensive    CLOSURE OF WOUND Left 11/5/2018    Procedure: CLOSURE, WOUND;  Surgeon: Karla Wheeler DPM;  Location: [facility] OR;  Service: Podiatry;  Laterality: Left;    DEBRIDEMENT OF MULTIPLE METATARSAL BONES Left 11/5/2018    Procedure: DEBRIDEMENT, METATARSAL BONE, 2 OR MORE BONES;  Surgeon: Karla Wheeler DPM;  Location: [facility] OR;  Service: Podiatry;  Laterality: Left;    EXCISION OF SKIN Left 9/27/2019    Procedure: EXCISION, SKIN;  Surgeon: Lenard Alarcon MD;  [address];  Service: Plastics;  Laterality: Left;  Plastics set, NIMS monitor, ACell    FOOT AMPUTATION THROUGH METATARSAL Left 9/21/2018    Procedure: AMPUTATION, FOOT, TRANSMETATARSAL;  Surgeon: Karla Wheeler DPM;  Location: [facility] OR;  Service: Podiatry;  Laterality: Left;    FOOT AMPUTATION THROUGH METATARSAL Left 10/31/2018    Procedure: AMPUTATION, FOOT, TRANSMETATARSAL;  Surgeon: Karla Wheeler DPM;  Location: [facility] OR;  Service: Podiatry;  Laterality: Left;    FOOT AMPUTATION THROUGH METATARSAL Left 11/5/2018    Procedure: AMPUTATION, FOOT, TRANSMETATARSAL;  Surgeon: Karla Wheeler DPM;  Location: [facility] OR;  Service: Podiatry;  Laterality: Left;  revisional transmetatarsal amputation, Left foot    IRRIGATION AND DEBRIDEMENT OF LOWER EXTREMITY Left 10/31/2018    Procedure: IRRIGATION AND DEBRIDEMENT, LOWER EXTREMITY;  Surgeon: Karla Wheeler DPM;  Location: [facility] OR;  Service: Podiatry;  Laterality: Left;    KIDNEY TRANSPLANT  05/21/2016    LEFT HEART CATHETERIZATION Left 7/21/2019    Procedure: CATHETERIZATION, HEART, LEFT;  Surgeon: Andrew Valdez MD;  Location: [facility] CATH LAB;  Service: Cardiology;  Laterality: Left;    LEG AMPUTATION THROUGH KNEE  2011    right LE, started as nail puncture leading to diabetic ulcer    SKIN FULL THICKNESS GRAFT Left 10/7/2019    Procedure: APPLICATION, GRAFT, SKIN, FULL-THICKNESS;  Surgeon: Lenard Alarcon MD;   Location: [facility] OR [address] [place] [address];  Service: Plastics;  Laterality: Left;    SURGICAL REMOVAL OF LESION OF FACE Right 10/7/2019    Procedure: EXCISION, LESION, FACE;  Surgeon: Lenard Alarcon MD;  Location: [facility] OR [address] [place] [address];  Service: Plastics;  Laterality: Right;         Family History:  Family History   Problem Relation Age of Onset    Cancer Father     Diabetes Father     Heart failure Father     Stroke Father     Heart failure Mother     Kidney disease Neg Hx        Social History:  Social History     Tobacco Use    Smoking status: Former Smoker     Packs/day: 1.00     Years: 40.00     Pack years: 40.00     Last attempt to quit: 2013     Years since quittin.9    Smokeless tobacco: Never Used   Substance and Sexual Activity    Alcohol use: Yes     Alcohol/week: 6.0 standard drinks     Types: 6 Cans of beer per week     Comment: seldom    Drug use: No    Sexual activity: Never       ROS   Review of Systems   Constitutional: Negative for fever.   HENT: Negative for sore throat.    Respiratory: Positive for shortness of breath. Negative for cough and wheezing.    Cardiovascular: Positive for chest pain.   Gastrointestinal: Positive for nausea. Negative for abdominal distention and abdominal pain.   Genitourinary: Negative for difficulty urinating, dysuria, frequency and urgency.   Musculoskeletal: Negative for back pain.   Skin: Negative for rash.   Neurological: Negative for weakness.   Hematological: Does not bruise/bleed easily.   All other systems reviewed and are negative.    Physical Exam      Initial Vitals [19 1031]   BP Pulse Resp Temp SpO2   121/80 84 20 97.7 °F (36.5 °C) 98 %      MAP       --          Physical Exam  Nursing Notes and Vital Signs Reviewed.  Constitutional: Patient is in mild distress. Chronically ill appearance.  Head: Atraumatic. Normocephalic. Healing left frontal scalp wound with good granulation tissue.   Eyes: PERRL. EOM intact. Conjunctivae are not pale. No scleral  "icterus.  ENT: Mucous membranes are moist. Oropharynx is clear and symmetric.    Neck: Supple. Full ROM. No lymphadenopathy.  Cardiovascular: Regular rate. Regular rhythm. No murmurs, rubs, or gallops. Distal pulses are 2+ and symmetric.  Pulmonary/Chest: No respiratory distress. Clear to auscultation bilaterally. No wheezing or rales.  Abdominal: Soft and non-distended.  There is no tenderness.  No rebound, guarding, or rigidity. Good bowel sounds.  Genitourinary: No CVA tenderness  Musculoskeletal: Moves all extremities. No obvious deformities. No edema. No calf tenderness.  RLE: right lower leg prosthesis.  Skin: Warm and dry.  Neurological:  Alert, awake, and appropriate.  Normal speech.  No acute focal neurological deficits are appreciated.  Psychiatric: Normal affect. Good eye contact. Appropriate in content.    ED Course    Procedures  ED Vital Signs:  Vitals:    12/14/19 1031 12/14/19 1046 12/14/19 1050 12/14/19 1230   BP: 121/80  132/84 (!) 146/90   Pulse: 84 79 79 81   Resp: 20  20 19   Temp: 97.7 °F (36.5 °C)      TempSrc: Oral      SpO2: 98%  100% 96%   Weight:  98.3 kg (216 lb 12.8 oz)     Height: 5' 11" (1.803 m)       12/14/19 1330   BP: (!) 148/89   Pulse: 85   Resp: 20   Temp:    TempSrc:    SpO2: 95%   Weight:    Height:        Abnormal Lab Results:  Labs Reviewed   CBC W/ AUTO DIFFERENTIAL - Abnormal; Notable for the following components:       Result Value    Hemoglobin 13.5 (*)     Mean Corpuscular Hemoglobin Conc 31.4 (*)     All other components within normal limits   COMPREHENSIVE METABOLIC PANEL - Abnormal; Notable for the following components:    CO2 20 (*)     Glucose 153 (*)     Creatinine 1.6 (*)     Albumin 3.4 (*)     eGFR if  51 (*)     eGFR if non  44 (*)     All other components within normal limits   B-TYPE NATRIURETIC PEPTIDE - Abnormal; Notable for the following components:     (*)     All other components within normal limits   URINALYSIS, " REFLEX TO URINE CULTURE - Abnormal; Notable for the following components:    Protein, UA 1+ (*)     All other components within normal limits    Narrative:     Preferred Collection Type->Urine, Clean Catch   TROPONIN I   MAGNESIUM   PHOSPHORUS   PROTIME-INR   APTT   URINALYSIS MICROSCOPIC    Narrative:     Preferred Collection Type->Urine, Clean Catch        All Lab Results:  Results for orders placed or performed during the hospital encounter of 12/14/19   CBC auto differential   Result Value Ref Range    WBC 6.89 3.90 - 12.70 K/uL    RBC 4.73 4.60 - 6.20 M/uL    Hemoglobin 13.5 (L) 14.0 - 18.0 g/dL    Hematocrit 43.0 40.0 - 54.0 %    Mean Corpuscular Volume 91 82 - 98 fL    Mean Corpuscular Hemoglobin 28.5 27.0 - 31.0 pg    Mean Corpuscular Hemoglobin Conc 31.4 (L) 32.0 - 36.0 g/dL    RDW 13.2 11.5 - 14.5 %    Platelets 257 150 - 350 K/uL    MPV 9.7 9.2 - 12.9 fL    Immature Granulocytes 0.3 0.0 - 0.5 %    Gran # (ANC) 4.6 1.8 - 7.7 K/uL    Immature Grans (Abs) 0.02 0.00 - 0.04 K/uL    Lymph # 1.6 1.0 - 4.8 K/uL    Mono # 0.6 0.3 - 1.0 K/uL    Eos # 0.1 0.0 - 0.5 K/uL    Baso # 0.02 0.00 - 0.20 K/uL    nRBC 0 0 /100 WBC    Gran% 66.6 38.0 - 73.0 %    Lymph% 23.2 18.0 - 48.0 %    Mono% 8.9 4.0 - 15.0 %    Eosinophil% 0.7 0.0 - 8.0 %    Basophil% 0.3 0.0 - 1.9 %    Differential Method Automated    Comprehensive metabolic panel   Result Value Ref Range    Sodium 136 136 - 145 mmol/L    Potassium 5.0 3.5 - 5.1 mmol/L    Chloride 104 95 - 110 mmol/L    CO2 20 (L) 23 - 29 mmol/L    Glucose 153 (H) 70 - 110 mg/dL    BUN, Bld 20 8 - 23 mg/dL    Creatinine 1.6 (H) 0.5 - 1.4 mg/dL    Calcium 9.3 8.7 - 10.5 mg/dL    Total Protein 7.7 6.0 - 8.4 g/dL    Albumin 3.4 (L) 3.5 - 5.2 g/dL    Total Bilirubin 0.7 0.1 - 1.0 mg/dL    Alkaline Phosphatase 83 55 - 135 U/L    AST 29 10 - 40 U/L    ALT 17 10 - 44 U/L    Anion Gap 12 8 - 16 mmol/L    eGFR if African American 51 (A) >60 mL/min/1.73 m^2    eGFR if non  44 (A)  >60 mL/min/1.73 m^2   Troponin I #1   Result Value Ref Range    Troponin I 0.015 0.000 - 0.026 ng/mL   B-Type natriuretic peptide (BNP)   Result Value Ref Range     (H) 0 - 99 pg/mL   Magnesium   Result Value Ref Range    Magnesium 1.6 1.6 - 2.6 mg/dL   Phosphorus   Result Value Ref Range    Phosphorus 3.5 2.7 - 4.5 mg/dL   Protime-INR   Result Value Ref Range    Prothrombin Time 10.3 9.0 - 12.5 sec    INR 0.9 0.8 - 1.2   APTT   Result Value Ref Range    aPTT 21.4 21.0 - 32.0 sec   Urinalysis, Reflex to Urine Culture Urine, Clean Catch   Result Value Ref Range    Specimen UA Urine, Clean Catch     Color, UA Yellow Yellow, Straw, Jessa    Appearance, UA Clear Clear    pH, UA 6.0 5.0 - 8.0    Specific Gravity, UA 1.025 1.005 - 1.030    Protein, UA 1+ (A) Negative    Glucose, UA Negative Negative    Ketones, UA Negative Negative    Bilirubin (UA) Negative Negative    Occult Blood UA Negative Negative    Nitrite, UA Negative Negative    Urobilinogen, UA Negative <2.0 EU/dL    Leukocytes, UA Negative Negative   Urinalysis Microscopic   Result Value Ref Range    RBC, UA 2 0 - 4 /hpf    WBC, UA 2 0 - 5 /hpf    Bacteria None None-Occ /hpf    Hyaline Casts, UA 0 0-1/lpf /lpf    Microscopic Comment SEE COMMENT      *Note: Due to a large number of results and/or encounters for the requested time period, some results have not been displayed. A complete set of results can be found in Results Review.         Imaging Results:  Imaging Results          X-Ray Chest AP Portable (Final result)  Result time 12/14/19 11:47:23    Final result by Kike Cordova Jr., MD (12/14/19 11:47:23)                 Impression:      No acute findings or interval change.      Electronically signed by: Kike Cordova Jr., MD  Date:    12/14/2019  Time:    11:47             Narrative:    EXAMINATION:  XR CHEST AP PORTABLE    CLINICAL HISTORY:  shortness of breath;    COMPARISON:  Prior radiograph from July 24, 2019.    FINDINGS:  Lungs are clear.   Heart size within normal limits.  Atherosclerotic calcification of the aortic knob.  No significant bony findings.                               The EKG was ordered, reviewed, and independently interpreted by the ED provider.  Interpretation time: 10:42  Rate: 80 BPM  Rhythm: Sinus rhythm with 2nd degree AV block  Interpretation: Left axis deviation. Pulmonary disease pattern. Abnormal ECG. No STEMI.             The Emergency Provider reviewed the vital signs and test results, which are outlined above.    ED Discussion     11:15 PM: Dr. Rose (Nephrologist) is at the beside evaluating the pt.     11:25 PM: Dr. Rose dx the pt with angina.     12:10 PM: Discussed pt's case with Dr. Valdez (Cardiology) who states he will come see the pt later today.    1:46 PM:  Dr. Valdez is at bedside and recommends increase of medication and is ready for discharge. Pt has an outpatient nuclear stress test on 12/18.     1:55 PM: Reassessed pt at this time.  Pt states his condition has improved at this time. Discussed with pt all pertinent ED information and results. Discussed pt dx and plan of tx. Gave pt all f/u and return to the ED instructions. All questions and concerns were addressed at this time. Pt expresses understanding of information and instructions, and is comfortable with plan to discharge. Pt is stable for discharge.      ED Medication(s):  Medications - No data to display    Discharge Medication List as of 12/14/2019  1:52 PM           Medication List      CHANGE how you take these medications    isosorbide mononitrate 30 MG 24 hr tablet  Commonly known as:  IMDUR  Take 2 tablets (60 mg total) by mouth once daily.  What changed:  how much to take        ASK your doctor about these medications    amLODIPine 10 MG tablet  Commonly known as:  NORVASC  Take 1 tablet (10 mg total) by mouth once daily.     aspirin 81 MG EC tablet  Commonly known as:  ECOTRIN  Take 1 tablet (81 mg total) by mouth once daily.     atorvastatin 80  MG tablet  Commonly known as:  LIPITOR  Take 1 tablet (80 mg total) by mouth once daily.     BD Insulin Syringe Ultra-Fine 1 mL 31 gauge x 5/16 Syrg  Generic drug:  insulin syringe-needle U-100  USE ONE AS DIRECTED FOUR TIMES DAILY WITH MEALS AND AT BEDTIME     blood sugar diagnostic Strp  1 each by Misc.(Non-Drug; Combo Route) route 3 (three) times daily.     blood-glucose meter kit  Use as instructed     clopidogrel 75 mg tablet  Commonly known as:  PLAVIX  Take 1 tablet (75 mg total) by mouth once daily.     diclofenac 1.3 % Pt12  Commonly known as:  FLECTOR     doxycycline 100 MG Cap  Commonly known as:  VIBRAMYCIN  Take 1 capsule (100 mg total) by mouth every 12 (twelve) hours. for 14 days     ergocalciferol 50,000 unit Cap  Commonly known as:  ERGOCALCIFEROL  Take 1 capsule (50,000 Units total) by mouth every 7 days. Take on Mondays     flash glucose scanning reader Misc  Commonly known as:  FreeStyle Fabrice 14 Day [place]  1 Device by Misc.(Non-Drug; Combo Route) route as needed.     flash glucose sensor Kit  Commonly known as:  FreeStyle Fabrice 14 Day Sensor  1 kit by Misc.(Non-Drug; Combo Route) route every 14 (fourteen) days.     gabapentin 300 MG capsule  Commonly known as:  NEURONTIN  Take 1 capsule (300 mg total) by mouth 3 (three) times daily. for 10 days     HYDROcodone-acetaminophen  mg per tablet  Commonly known as:  NORCO  1 tablet by Per G Tube route every 6 (six) hours as needed for Pain.     liraglutide 0.6 mg/0.1 mL (18 mg/3 mL) subq PNIJ 0.6 mg/0.1 mL (18 mg/3 mL) Pnij  Commonly known as:  Victoza 2-Catracho  Inject 1.8 mg into the skin once daily.     metoprolol tartrate 25 MG tablet  Commonly known as:  LOPRESSOR  Take 1 tablet (25 mg total) by mouth 2 (two) times daily.     mupirocin 2 % ointment  Commonly known as:  BACTROBAN  Apply topically 3 (three) times daily.     mupirocin calcium 2% 2 % cream  Commonly known as:  Bactroban  Apply topically 3 (three) times daily.     naloxone 4  "mg/actuation Spry  Commonly known as:  NARCAN     nitroGLYCERIN 0.4 MG SL tablet  Commonly known as:  NITROSTAT  Place 1 tablet (0.4 mg total) under the tongue every 5 (five) minutes as needed.     ondansetron 4 MG Tbdl  Commonly known as:  ZOFRAN-ODT  Take 1 tablet (4 mg total) by mouth every 8 (eight) hours as needed.     * pen needle, diabetic 31 gauge x 3/16" Ndle  Commonly known as:  Sure-Fine Pen Needles  1 each by Misc.(Non-Drug; Combo Route) route 4 (four) times daily with meals and nightly.     * pen needle, diabetic 32 gauge x 5/32" Ndle  1 each by Misc.(Non-Drug; Combo Route) route 4 (four) times daily. Urszula Needles     predniSONE 5 MG tablet  Commonly known as:  DELTASONE  Take 1 tablet (5 mg total) by mouth once daily.     sodium bicarbonate 650 MG tablet  Take 4 tablets (2,600 mg total) by mouth 2 (two) times daily.     tacrolimus 0.5 MG Cap  Commonly known as:  PROGRAF  Take 3 capsules (1.5 mg total) by mouth every 12 (twelve) hours.     Tresiba FlexTouch U-100 100 unit/mL (3 mL) Inpn  Generic drug:  insulin degludec         * This list has 2 medication(s) that are the same as other medications prescribed for you. Read the directions carefully, and ask your doctor or other care provider to review them with you.               Where to Get Your Medications      These medications were sent to [id number] - [place] - [address] [facility]  [address] [facility] [address]    Phone:  [phone]   · isosorbide mononitrate 30 MG 24 hr tablet         Follow-up Information     [facility] - Cardiology On 12/18/2019.    Specialty:  Cardiology  Why:  Return to the Emergency Room, If symptoms worsen  Contact information:  [address] [facility] [address] [phone]  Additional information:  3rd Floor - From [address], take the [facility] exit [address] Enter the facility from the [address]                   Medical Decision Making    Medical Decision Making:   Clinical " Tests:   Lab Tests: Ordered and Reviewed  Radiological Study: Ordered and Reviewed  Medical Tests: Ordered and Reviewed           Scribe Attestation:   Scribe #1: I performed the above scribed service and the documentation accurately describes the services I performed. I attest to the accuracy of the note.    Attending:   Physician Attestation Statement for Scribe #1: I, Charito Oleary MD, personally performed the services described in this documentation, as scribed by Lauri Washington, in my presence, and it is both accurate and complete.          Clinical Impression       ICD-10-CM ICD-9-CM   1. SOB (shortness of breath) R06.02 786.05   2. Renal transplant, status post Z94.0 V42.0       Disposition:   Disposition: Discharged  Condition: Stable         Charito Oleary MD  12/14/19 150

## 2019-12-14 NOTE — CONSULTS
"[facility] -   Cardiology  Consult Note    Patient Name: Lavelle Ladd  MRN: [id number]  Admission Date: 2019  Hospital Length of Stay: 0 days  Code Status: Prior   Attending Provider: No att. providers found   Consulting Provider: Andrew Valdez MD  Primary Care Physician: Rishabh Esteban MD  Principal Problem:CAMARA (dyspnea on exertion)    Patient information was obtained from patient, past medical records and ER records.     Inpatient consult to Cardiology  Consult performed by: Andrew Valdez MD  Consult ordered by: Charito Oleary MD  Reason for consult: DYSPNEA        Subjective:     Chief Complaint:  DYSPNEA     HPI:   Pt presents to ER with c/o dyspnea and fatigue.  Pt known to Cardiology service.    S/p anterolateral STEMI  with LHC performed and showed apical LAD occlusion, diffuse nonobstructive LAD disease, normal EF.  Optimal medical mgt advised.  He has h/o renal transplant, PAD, HTN.  States he felt like he needed dialysis again recently with increased CAMARA and fatigue.  No recurrent angina like his MI pain.  Has a "twinge" of cp, occasionally, lasts seconds and resolves.  Has not had to take sl ntg.  In ER labs are stable, negative troponin, .  ECG shows sinus with wenckebach.  No dizziness or syncope.  No acute st-t abnormalities noted.    Past Medical History:   Diagnosis Date    DINORAH (acute kidney injury) 2016    Arthritis     CAD in native artery 2019    CHF (congestive heart failure)     Chronic obstructive pulmonary disease 2016    Coronary artery disease involving native coronary artery of native heart without angina pectoris 2016    CRI (chronic renal insufficiency) 2019     donor kidney transplant for DM 16     Induction with Thymo x3 and IV solumedrol to total 875mg  Kidney Biopsy  2016: 16 glomeruli, ACR type 1 AVR type 2, significant microcirculatory changes, c4d negative, No DSA, 5 to10% fibrosis. Treated " with thymo x8 6/21/2016- no rejection      Diastolic heart failure     Encounter for blood transfusion     ESRD on RRT since 10/2013 10/29/2013    Biopsy proven diabetic nephropathy and lymphoplasmacytic interstitial infiltrate not c/w with AIN (ddx sjogrens or assoc with tamm-horsefall protein extravasation)     GERD (gastroesophageal reflux disease)     History of hepatitis C, s/p successful Rx w/ SVR12 - 4/2017 4/5/2017    Completed 12 weeks harvoni w/ SVR    Hyperlipidemia     Hypertension     PAD (peripheral artery disease) 7/21/2019    PIC line (peripherally inserted central catheter) flush     Prophylactic immunotherapy     Proteinuria     PVD (peripheral vascular disease) 6/26/2017    RLE BKA CT 12/11/16 Extensive atherosclerotic disease of the aorta and mesenteric arteries.     Renal hypertension     Type 2 diabetes mellitus with diabetic neuropathy, with long-term current use of insulin 12/1/2016    Vitamin B12 deficiency        Past Surgical History:   Procedure Laterality Date    AORTOGRAPHY WITH SERIALOGRAPHY N/A 6/14/2018    Procedure: LEFT LEG ANGIOGRAM;  Surgeon: Donal Mcdonald MD;  Location: [facility] CATH LAB;  Service: Vascular;  Laterality: N/A;    av bovine graft      Left UE    AV FISTULA PLACEMENT      left UE    CARDIAC CATHETERIZATION  02/2015    CLOSURE OF WOUND Left 9/24/2018    Procedure: CLOSURE, WOUND;  Surgeon: Karla Wheeler DPM;  Location: [facility] OR;  Service: Podiatry;  Laterality: Left;  Secondary Wound closure, extensive    CLOSURE OF WOUND Left 11/5/2018    Procedure: CLOSURE, WOUND;  Surgeon: Karla Wheeler DPM;  Location: [facility] OR;  Service: Podiatry;  Laterality: Left;    DEBRIDEMENT OF MULTIPLE METATARSAL BONES Left 11/5/2018    Procedure: DEBRIDEMENT, METATARSAL BONE, 2 OR MORE BONES;  Surgeon: Karla Wheeler DPM;  Location: [facility] OR;  Service: Podiatry;  Laterality: Left;    EXCISION OF SKIN Left 9/27/2019    Procedure: EXCISION, SKIN;  Surgeon:  Lenard Alarcon MD;  [address]  Service: Plastics;  Laterality: Left;  Plastics set, NIMS monitor, ACell    FOOT AMPUTATION THROUGH METATARSAL Left 9/21/2018    Procedure: AMPUTATION, FOOT, TRANSMETATARSAL;  Surgeon: Karla Wheeler DPM;  Location: [facility] OR;  Service: Podiatry;  Laterality: Left;    FOOT AMPUTATION THROUGH METATARSAL Left 10/31/2018    Procedure: AMPUTATION, FOOT, TRANSMETATARSAL;  Surgeon: Karla Wheeler DPM;  Location: [facility] OR;  Service: Podiatry;  Laterality: Left;    FOOT AMPUTATION THROUGH METATARSAL Left 11/5/2018    Procedure: AMPUTATION, FOOT, TRANSMETATARSAL;  Surgeon: Karla Wheeler DPM;  Location: [facility] OR;  Service: Podiatry;  Laterality: Left;  revisional transmetatarsal amputation, Left foot    IRRIGATION AND DEBRIDEMENT OF LOWER EXTREMITY Left 10/31/2018    Procedure: IRRIGATION AND DEBRIDEMENT, LOWER EXTREMITY;  Surgeon: Karla Wheeler DPM;  Location: [facility] OR;  Service: Podiatry;  Laterality: Left;    KIDNEY TRANSPLANT  05/21/2016    LEFT HEART CATHETERIZATION Left 7/21/2019    Procedure: CATHETERIZATION, HEART, LEFT;  Surgeon: Andrew Valdez MD;  Location: [facility] CATH LAB;  Service: Cardiology;  Laterality: Left;    LEG AMPUTATION THROUGH KNEE  2011    right LE, started as nail puncture leading to diabetic ulcer    SKIN FULL THICKNESS GRAFT Left 10/7/2019    Procedure: APPLICATION, GRAFT, SKIN, FULL-THICKNESS;  Surgeon: Lenard Alarcon MD;  [address];  Service: Plastics;  Laterality: Left;    SURGICAL REMOVAL OF LESION OF FACE Right 10/7/2019    Procedure: EXCISION, LESION, FACE;  Surgeon: Lenard Alarcon MD;  [address];  Service: Plastics;  Laterality: Right;       Review of patient's allergies indicates:  No Known Allergies    No current facility-administered medications on file prior to encounter.      Current Outpatient Medications on File Prior to Encounter   Medication Sig    amLODIPine (NORVASC) 10 MG  "tablet Take 1 tablet (10 mg total) by mouth once daily.    aspirin (ECOTRIN) 81 MG EC tablet Take 1 tablet (81 mg total) by mouth once daily.    atorvastatin (LIPITOR) 80 MG tablet Take 1 tablet (80 mg total) by mouth once daily.    BD INSULIN SYRINGE ULTRA-FINE 1 mL 31 gauge x 5/16 Syrg USE ONE AS DIRECTED FOUR TIMES DAILY WITH MEALS AND AT BEDTIME    blood sugar diagnostic Strp 1 each by Misc.(Non-Drug; Combo Route) route 3 (three) times daily.    blood-glucose meter kit Use as instructed    clopidogrel (PLAVIX) 75 mg tablet Take 1 tablet (75 mg total) by mouth once daily.    diclofenac (FLECTOR) 1.3 % PT12 Apply 1 packet topically once daily.    doxycycline (VIBRAMYCIN) 100 MG Cap Take 1 capsule (100 mg total) by mouth every 12 (twelve) hours. for 14 days    flash glucose scanning reader (FREESTYLE BRYAN 14 DAY READER) Misc 1 Device by Misc.(Non-Drug; Combo Route) route as needed.    flash glucose sensor (FREESTYLE BRYAN 14 DAY SENSOR) Kit 1 kit by Misc.(Non-Drug; Combo Route) route every 14 (fourteen) days.    gabapentin (NEURONTIN) 300 MG capsule Take 1 capsule (300 mg total) by mouth 3 (three) times daily. for 10 days    HYDROcodone-acetaminophen (NORCO)  mg per tablet 1 tablet by Per G Tube route every 6 (six) hours as needed for Pain.    liraglutide 0.6 mg/0.1 mL, 18 mg/3 mL, subq PNIJ (VICTOZA 2-KOKI) 0.6 mg/0.1 mL (18 mg/3 mL) PnIj Inject 1.8 mg into the skin once daily.    metoprolol tartrate (LOPRESSOR) 25 MG tablet Take 1 tablet (25 mg total) by mouth 2 (two) times daily.    mupirocin (BACTROBAN) 2 % ointment Apply topically 3 (three) times daily.    mupirocin calcium 2% (BACTROBAN) 2 % cream Apply topically 3 (three) times daily.    pen needle, diabetic (SURE-FINE PEN NEEDLES) 31 gauge x 3/16" Ndle 1 each by Misc.(Non-Drug; Combo Route) route 4 (four) times daily with meals and nightly.    pen needle, diabetic 32 gauge x 5/32" Ndle 1 each by Misc.(Non-Drug; Combo Route) route 4 " (four) times daily. Urszula Needles    predniSONE (DELTASONE) 5 MG tablet Take 1 tablet (5 mg total) by mouth once daily.    tacrolimus (PROGRAF) 0.5 MG Cap Take 3 capsules (1.5 mg total) by mouth every 12 (twelve) hours.    TRESIBA FLEXTOUCH U-100 100 unit/mL (3 mL) InPn Inject 30 Units into the skin 2 (two) times daily.    [DISCONTINUED] isosorbide mononitrate (IMDUR) 30 MG 24 hr tablet Take 1 tablet (30 mg total) by mouth once daily.    ergocalciferol (ERGOCALCIFEROL) 50,000 unit Cap Take 1 capsule (50,000 Units total) by mouth every 7 days. Take on     naloxone (NARCAN) 4 mg/actuation Spry 1 spray by Nasal route once.    nitroGLYCERIN (NITROSTAT) 0.4 MG SL tablet Place 1 tablet (0.4 mg total) under the tongue every 5 (five) minutes as needed.    ondansetron (ZOFRAN-ODT) 4 MG TbDL Take 1 tablet (4 mg total) by mouth every 8 (eight) hours as needed.    sodium bicarbonate 650 MG tablet Take 4 tablets (2,600 mg total) by mouth 2 (two) times daily.     Family History     Problem Relation (Age of Onset)    Cancer Father    Diabetes Father    Heart failure Father, Mother    Stroke Father        Tobacco Use    Smoking status: Former Smoker     Packs/day: 1.00     Years: 40.00     Pack years: 40.00     Last attempt to quit: 2013     Years since quittin.9    Smokeless tobacco: Never Used   Substance and Sexual Activity    Alcohol use: Yes     Alcohol/week: 6.0 standard drinks     Types: 6 Cans of beer per week     Comment: seldom    Drug use: No    Sexual activity: Never     Review of Systems   Constitution: Positive for malaise/fatigue.   HENT: Negative.    Eyes: Negative.    Cardiovascular: Positive for chest pain and dyspnea on exertion.   Respiratory: Positive for shortness of breath.    Endocrine: Negative.    Hematologic/Lymphatic: Negative.    Skin: Negative.    Musculoskeletal: Negative.    Gastrointestinal: Negative.    Genitourinary: Negative.    Neurological: Negative.     Psychiatric/Behavioral: Negative.    Allergic/Immunologic: Negative.      Objective:     Vital Signs (Most Recent):  Temp: 97.7 °F (36.5 °C) (12/14/19 1031)  Pulse: 85 (12/14/19 1330)  Resp: 20 (12/14/19 1330)  BP: (!) 148/89 (12/14/19 1330)  SpO2: 95 % (12/14/19 1330) Vital Signs (24h Range):  Temp:  [97.7 °F (36.5 °C)] 97.7 °F (36.5 °C)  Pulse:  [79-85] 85  Resp:  [19-20] 20  SpO2:  [95 %-100 %] 95 %  BP: (121-148)/(80-90) 148/89     Weight: 98.3 kg (216 lb 12.8 oz)  Body mass index is 30.24 kg/m².    SpO2: 95 %  O2 Device (Oxygen Therapy): room air    No intake or output data in the 24 hours ending 12/14/19 1403    Lines/Drains/Airways     None                 Physical Exam   Constitutional: He is oriented to person, place, and time. He appears well-developed and well-nourished.   HENT:   Head: Normocephalic.   Neck: Normal range of motion. Neck supple. Normal carotid pulses, no hepatojugular reflux and no JVD present. Carotid bruit is not present. No thyromegaly present.   Cardiovascular: Normal rate, regular rhythm, S1 normal and S2 normal. PMI is not displaced. Exam reveals no S3, no S4, no distant heart sounds, no friction rub, no midsystolic click and no opening snap.   No murmur heard.  Pulses:       Radial pulses are 2+ on the right side, and 2+ on the left side.   Pulmonary/Chest: Effort normal and breath sounds normal. He has no wheezes. He has no rales.   Abdominal: Soft. Bowel sounds are normal. He exhibits no distension, no abdominal bruit, no ascites and no mass. There is no tenderness.   Musculoskeletal: He exhibits no edema.   Neurological: He is alert and oriented to person, place, and time.   Skin: Skin is warm.   Psychiatric: He has a normal mood and affect. His behavior is normal.   Nursing note and vitals reviewed.      Significant Labs:   BMP:   Recent Labs   Lab 12/14/19  1109   *      K 5.0      CO2 20*   BUN 20   CREATININE 1.6*   CALCIUM 9.3   MG 1.6   , CMP   Recent  Labs   Lab 12/14/19  1109      K 5.0      CO2 20*   *   BUN 20   CREATININE 1.6*   CALCIUM 9.3   PROT 7.7   ALBUMIN 3.4*   BILITOT 0.7   ALKPHOS 83   AST 29   ALT 17   ANIONGAP 12   ESTGFRAFRICA 51*   EGFRNONAA 44*   , CBC   Recent Labs   Lab 12/14/19  1109   WBC 6.89   HGB 13.5*   HCT 43.0      , INR   Recent Labs   Lab 12/14/19  1109   INR 0.9    and Troponin   Recent Labs   Lab 12/14/19  1109   TROPONINI 0.015       Significant Imaging: Echocardiogram:   2D echo with color flow doppler:   Results for orders placed or performed during the hospital encounter of 07/21/19   2D echo with color flow doppler   Result Value Ref Range    QEF 60 55 - 65    Narrative    Date of Procedure: 07/21/2019        TEST DESCRIPTION   Technical Quality: This is a technically challenging study. There is poor endocardial definition.     Aorta: The aortic root is normal in size, measuring 2.9 cm at sinotubular junction and 3.6 cm at Sinuses of Valsalva. The proximal ascending aorta is normal in size, measuring 2.7 cm across.     Left Atrium: The left atrial volume index is moderately enlarged, measuring 45.81 cc/m2.     Left Ventricle: The left ventricle is normal in size, with an end-diastolic diameter of 4.0 cm, and an end-systolic diameter of 2.4 cm. Posterior wall thickness is mildly increased, with the septum measuring 1.2 cm and the posterior wall measuring 1.4 cm   across. Relative wall thickness was increased at 0.70, and the LV mass index was 101.8 g/m2 consistent with concentric remodeling. The apex is akinetic.   Left ventricular systolic function appears normal. Visually estimated ejection fraction is 60-65%. The LV Doppler derived stroke volume equals 66.0 ccs.     Diastolic indices: E/e' ratio(avg) 10. Diastolic function is indeterminate.     Right Atrium: The right atrium is normal in size, measuring 5.5 cm in length and 2.8 cm in width in the apical view.     Right Ventricle: The right ventricle  is normal in size. Global right ventricular systolic function appears normal.     Aortic Valve:  The aortic valve is normal in structure. The peak velocity obtained across the aortic valve is 1.06 m/s, which translates to a peak gradient of 4 mmHg. The mean gradient is 3 mmHg. Using a left ventricular outflow tract diameter of 2.4 cm,   a left ventricular outflow tract velocity time integral of 15 cm, and a peak instantaneous transvalvular velocity time integral of 19 cm, the calculated aortic valve area is 3.59 cm2(AVAi is 1.67 cm2/m2).     Mitral Valve:  The mitral valve is normal in structure.     Tricuspid Valve:  The tricuspid valve is normal in structure.     Pulmonary Valve:  The pulmonic valve is not well seen.     IVC: IVC is normal in size and collapses > 50% with a sniff, suggesting normal right atrial pressure of 3 mmHg.     Intracavitary: There is no evidence of pericardial effusion, intracavity mass, thrombi, or vegetation.         CONCLUSIONS     1 - Normal left ventricular systolic function (EF 60-65%).     2 - Indeterminate LV diastolic function.     3 - Normal right ventricular systolic function .     4 - Wall motion abnormalities.     5 - Moderate left atrial enlargement.             This document has been electronically    SIGNED BY: Andrew Valdez MD On: 07/21/2019 11:21     Assessment and Plan:     UNCLEAR ETIOLOGY OF PATIENT'S SYMPTOMS OF DYSPNEA, AND SUBJECTIVE FEELINGS OF NOT DOING WELL.  NO FOCAL EXAM FINDINGS.  NOT VOLUME OVERLOADED ON EXAM.  DOES NOT APPEAR TO BE CHF RELATED BASED ON EXAM AND LOW .  NO ACUTE ISCHEMIC ECG CHANGES.  RECOMMEND CONTINUING OPTIMAL MEDICAL THERAPY FOR HIS CHRONIC CAD, S/P CATH 7/19 WITH APICAL LAD OCCLUSION AND LAD DISEASE (NONOBSTRUCTIVE OTHER THAN APICAL OCCLUSION).  INCREASE IMDUR TO 60 MG QD.  CONTINUE ASA, PLAVIX AND OTHER CV MEDS.  HE HAS STRESS MPI ALREADY SCHEDULED THIS WEEK IN CLINIC.  RECOMMEND PT F/U WITH STRESS TEST AND CARDS CLINIC AFTER  DISCHARGE FROM HOSPITAL.  WILL BE AVAILABLE AS NEEDED.      * CAMARA (dyspnea on exertion)  See management plan detailed above.     Mobitz type 1 second degree atrioventricular block  Asymptomatic.  Monitor in future.    Old MI (myocardial infarction)  Optimal medical mgt advised for CAD.    Atypical chest pain  See management plan detailed above.     Kidney transplant recipient  Per Nephrology recommendations.    Coronary artery disease of native artery of native heart with stable angina pectoris  See management plan detailed above.       VTE Risk Mitigation (From admission, onward)    None          Thank you for your consult.     Andrew Valdez MD  Cardiology   [facility] -

## 2019-12-14 NOTE — DISCHARGE INSTRUCTIONS
Please increase your Imdur from 30 mg daily to 60 mg daily.  A new prescription has been sent to your pharmacy. Please follow up as scheduled on Wednesday for your nuclear stress test.

## 2019-12-14 NOTE — ED NOTES
Pt c/o left sided CP x 1 week, left arm pain and SOB x 2 weeks. Pain rated 10/10. Reports heart attack in October 2019, renal transplant 4-5 years ago. Denies fever, n/v/d, HA, weakness, dizziness, vision changes, numbness, tingling or any other symptoms at this time.     Patient identifiers verified and correct for Lavelle Ladd.    LOC: The patient is awake, alert and aware of environment with an appropriate affect, the patient is oriented x 3 and speaking appropriately.  APPEARANCE: Patient appears mildly distressed, patient is clean and well groomed, patient's clothing is properly fastened.  SKIN: The skin is warm and dry, color consistent with ethnicity, patient has normal skin turgor and moist mucus membranes, generalized bruising, Flector patch to left upper arm, glucose monitor to left upper arm, wound to left side of scalp - pt reports radiation tx x 2 weeks ago - clean/dry bandage in place.  MUSCULOSKELETAL: Patient moving all extremities spontaneously, right BKA - prosthesis in place.   RESPIRATORY: Airway is open and patent, respirations are spontaneous, dyspnea upon exertion.  CARDIAC: Patient has a normal rate, no peripheral edema noted, capillary refill < 3 seconds.  ABDOMEN: Soft and non tender to palpation.

## 2019-12-14 NOTE — TELEPHONE ENCOUNTER
"Pt reports feeling like he did before he had his kidney txp, worse symptoms are weakness, dyspnea on exertion, and "feeling lousy." Pt states he had recent MI. Per triage protocol, pt advised to see physician within 4 hours. Due to pt's txp hx, I advised that he go to ED to be seen and to let them know that he is a txp patient. Pt verbalized understanding.    Reason for Disposition   [1] MILD difficulty breathing (e.g., minimal/no SOB at rest, SOB with walking, pulse <100) AND [2] NEW-onset or WORSE than normal    Additional Information   Negative: [1] Breathing stopped AND [2] hasn't returned   Negative: Choking on something   Negative: Severe difficulty breathing (e.g., struggling for each breath, speaks in single words)   Negative: Bluish (or gray) lips or face now   Negative: Difficult to awaken or acting confused (e.g., disoriented, slurred speech)   Negative: Passed out (i.e., lost consciousness, collapsed and was not responding)   Negative: Wheezing started suddenly after medicine, an allergic food or bee sting   Negative: Stridor   Negative: Slow, shallow and weak breathing   Negative: Sounds like a life-threatening emergency to the triager   Negative: Chest pain   Negative: [1] Wheezing (high pitched whistling sound) AND [2] previous asthma attacks or use of asthma medicines   Negative: [1] Difficulty breathing AND [2] only present when coughing   Negative: [1] Difficulty breathing AND [2] only from stuffy or runny nose   Negative: [1] MODERATE difficulty breathing (e.g., speaks in phrases, SOB even at rest, pulse 100-120) AND [2] NEW-onset or WORSE than normal   Negative: Wheezing can be heard across the room   Negative: Drooling or spitting out saliva (because can't swallow)   Negative: History of prior "blood clot" in leg or lungs (i.e., deep vein thrombosis, pulmonary embolism)   Negative: History of inherited increased risk of blood clots (e.g., Factor 5 Leiden, Anti-thrombin 3, " "Protein C or Protein S deficiency, Prothrombin mutation)   Negative: Recent illness requiring prolonged bedrest (i.e., immobilization)   Negative: Hip or leg fracture in past 2 months (e.g., had cast on leg or ankle)   Negative: Major surgery in the past month   Negative: Recent long-distance travel with prolonged time in car, bus, plane, or train (i.e., within past 2 weeks; 6 or  more hours duration)   Negative: Extra heart beats OR irregular heart beating   (i.e., "palpitations")   Negative: Fever > 103 F (39.4 C)   Negative: [1] Fever > 101 F (38.3 C) AND [2] age > 60   Negative: [1] Fever > 100.0 F (37.8 C) AND [2] bedridden (e.g., nursing home patient, CVA, chronic illness, recovering from surgery)   Negative: [1] Fever > 100.0 F (37.8 C) AND [2] diabetes mellitus or weak immune system (e.g., HIV positive, cancer chemo, splenectomy, chronic steroids)   Negative: Pregnant or postpartum (< 1 month since delivery)   Negative: [1] Periods where breathing stops and then resumes normally AND [2] bedridden (e.g., nursing home patient, CVA)   Negative: Patient sounds very sick or weak to the triager    Protocols used: BREATHING DIFFICULTY-A-AH      "

## 2019-12-14 NOTE — ED NOTES
Pt refuses IV to be placed in AC. Attempted to start IV in arms and hands unsuccessfully. Provider notified - states okay to hold IV placement at this time.

## 2019-12-14 NOTE — ASSESSMENT & PLAN NOTE
Kidney transplant function is stable.  Most of the shortness of breath is coming from angina.  Further discussion will be per Cardiology.  No evidence of fluid overload or renal dysfunction.  Continue immunosuppressive therapy without change.

## 2019-12-14 NOTE — ED NOTES
The patient is resting quietly, eyes closed, arouses easily to stimuli. Airway is open and patent, respirations are spontaneous, normal respiratory effort and rate noted, skin warm and dry, appearance: in no acute distress and resting comfortably. Will continue to monitor.

## 2019-12-14 NOTE — CONSULTS
[facility] -   Nephrology  Consult Note        Patient Name: Lavelle Ladd  MRN: [id number]  Admission Date: 2019  Hospital Length of Stay: 0 days  Attending Provider: Charito Oleary MD   Primary Care Physician: Rishabh Esteban MD  Principal Problem:<principal problem not specified>    Consults  Subjective:     HPI: Patient is a 64-year-old male with type 1 diabetes and ESRD  S/p kidney transplant 2016.  His baseline creatinine has been ranging between 1.5 and 2.0.  Patient has severe peripheral vascular disease and coronary artery disease.  Last heart catheterization was done in 2019 showing tortuous arteries with 50% lesions and some distal lesions.  Last heart catheterization was performed by Dr. Valdez.  Patient was seen recently in the Cardiology Division( ) with symptoms of dyspnea on exertion.  Stress test was schedule for next week.  Patient comes to the emergency room with worsening shortness of breath.  Patient seen and evaluated in the emergency room at the bedside.  Diagnosis of stable versus unstable angina discussed with the patient and the emergency room physician Dr.A. Oleary        Patient has history of rejection in the kidney in  the kidney biopsy is as follows:      Kidney Biopsy 2016: 16 glomeruli, ACR type 1 AVR type 2, significant microcirculatory changes, c4d negative, No DSA, 5 to10% fibrosis           Past Medical History:   Diagnosis Date    DINORAH (acute kidney injury) 2016    Arthritis     CAD in native artery 2019    CHF (congestive heart failure)     Chronic obstructive pulmonary disease 2016    Coronary artery disease involving native coronary artery of native heart without angina pectoris 2016    CRI (chronic renal insufficiency) 2019     donor kidney transplant for DM 16     Induction with Thymo x3 and IV solumedrol to total 875mg  Kidney Biopsy  2016: 16 glomeruli, ACR type 1 AVR  type 2, significant microcirculatory changes, c4d negative, No DSA, 5 to10% fibrosis. Treated with thymo x8 6/21/2016- no rejection      Diastolic heart failure     Encounter for blood transfusion     ESRD on RRT since 10/2013 10/29/2013    Biopsy proven diabetic nephropathy and lymphoplasmacytic interstitial infiltrate not c/w with AIN (ddx sjogrens or assoc with tamm-horsefall protein extravasation)     GERD (gastroesophageal reflux disease)     History of hepatitis C, s/p successful Rx w/ SVR12 - 4/2017 4/5/2017    Completed 12 weeks harvoni w/ SVR    Hyperlipidemia     Hypertension     PAD (peripheral artery disease) 7/21/2019    PIC line (peripherally inserted central catheter) flush     Prophylactic immunotherapy     Proteinuria     PVD (peripheral vascular disease) 6/26/2017    RLE BKA CT 12/11/16 Extensive atherosclerotic disease of the aorta and mesenteric arteries.     Renal hypertension     Type 2 diabetes mellitus with diabetic neuropathy, with long-term current use of insulin 12/1/2016    Vitamin B12 deficiency        Past Surgical History:   Procedure Laterality Date    AORTOGRAPHY WITH SERIALOGRAPHY N/A 6/14/2018    Procedure: LEFT LEG ANGIOGRAM;  Surgeon: Donal Mcdonald MD;  Location: [facility] CATH LAB;  Service: Vascular;  Laterality: N/A;    av bovine graft      Left UE    AV FISTULA PLACEMENT      left UE    CARDIAC CATHETERIZATION  02/2015    CLOSURE OF WOUND Left 9/24/2018    Procedure: CLOSURE, WOUND;  Surgeon: Karla Wheeler DPM;  Location: [facility] OR;  Service: Podiatry;  Laterality: Left;  Secondary Wound closure, extensive    CLOSURE OF WOUND Left 11/5/2018    Procedure: CLOSURE, WOUND;  Surgeon: Karla Wheeler DPM;  Location: [facility] OR;  Service: Podiatry;  Laterality: Left;    DEBRIDEMENT OF MULTIPLE METATARSAL BONES Left 11/5/2018    Procedure: DEBRIDEMENT, METATARSAL BONE, 2 OR MORE BONES;  Surgeon: Karla Wheeler DPM;  Location: [facility] OR;  Service: Podiatry;   Laterality: Left;    EXCISION OF SKIN Left 9/27/2019    Procedure: EXCISION, SKIN;  Surgeon: Lenard Alarcon MD;  [facility];  Service: Plastics;  Laterality: Left;  Plastics set, NIMS monitor, [employer]    FOOT AMPUTATION THROUGH METATARSAL Left 9/21/2018    Procedure: AMPUTATION, FOOT, TRANSMETATARSAL;  Surgeon: Karla Wheeler DPM;  Location: [facility] OR;  Service: Podiatry;  Laterality: Left;    FOOT AMPUTATION THROUGH METATARSAL Left 10/31/2018    Procedure: AMPUTATION, FOOT, TRANSMETATARSAL;  Surgeon: Karla Wheeler DPM;  Location: [facility] OR;  Service: Podiatry;  Laterality: Left;    FOOT AMPUTATION THROUGH METATARSAL Left 11/5/2018    Procedure: AMPUTATION, FOOT, TRANSMETATARSAL;  Surgeon: Karla Wheeler DPM;  Location: [facility] OR;  Service: Podiatry;  Laterality: Left;  revisional transmetatarsal amputation, Left foot    IRRIGATION AND DEBRIDEMENT OF LOWER EXTREMITY Left 10/31/2018    Procedure: IRRIGATION AND DEBRIDEMENT, LOWER EXTREMITY;  Surgeon: Karla Wheeler DPM;  Location: [facility] OR;  Service: Podiatry;  Laterality: Left;    KIDNEY TRANSPLANT  05/21/2016    LEFT HEART CATHETERIZATION Left 7/21/2019    Procedure: CATHETERIZATION, HEART, LEFT;  Surgeon: Andrew Valdez MD;  Location: [facility] CATH LAB;  Service: Cardiology;  Laterality: Left;    LEG AMPUTATION THROUGH KNEE  2011    right LE, started as nail puncture leading to diabetic ulcer    SKIN FULL THICKNESS GRAFT Left 10/7/2019    Procedure: APPLICATION, GRAFT, SKIN, FULL-THICKNESS;  Surgeon: Lenard Alarcon MD;  Location: [facility] OR 2ND FLR;  Service: Plastics;  Laterality: Left;    SURGICAL REMOVAL OF LESION OF FACE Right 10/7/2019    Procedure: EXCISION, LESION, FACE;  Surgeon: Lenard Alarcon MD;  [facility];  Service: Plastics;  Laterality: Right;       Review of patient's allergies indicates:  No Known Allergies  No current facility-administered medications for this encounter.      Current Outpatient  "Medications   Medication    amLODIPine (NORVASC) 10 MG tablet    aspirin (ECOTRIN) 81 MG EC tablet    atorvastatin (LIPITOR) 80 MG tablet    BD INSULIN SYRINGE ULTRA-FINE 1 mL 31 gauge x 5/16 Syrg    blood sugar diagnostic Strp    blood-glucose meter kit    clopidogrel (PLAVIX) 75 mg tablet    diclofenac (FLECTOR) 1.3 % PT12    doxycycline (VIBRAMYCIN) 100 MG Cap    flash glucose scanning reader (FREESTYLE BRYAN 14 DAY READER) Misc    flash glucose sensor (FREESTYLE BRYAN 14 DAY SENSOR) Kit    gabapentin (NEURONTIN) 300 MG capsule    HYDROcodone-acetaminophen (NORCO)  mg per tablet    isosorbide mononitrate (IMDUR) 30 MG 24 hr tablet    liraglutide 0.6 mg/0.1 mL, 18 mg/3 mL, subq PNIJ (VICTOZA 2-KOKI) 0.6 mg/0.1 mL (18 mg/3 mL) PnIj    metoprolol tartrate (LOPRESSOR) 25 MG tablet    mupirocin (BACTROBAN) 2 % ointment    mupirocin calcium 2% (BACTROBAN) 2 % cream    pen needle, diabetic (SURE-FINE PEN NEEDLES) 31 gauge x 3/16" Ndle    pen needle, diabetic 32 gauge x 5/32" Ndle    predniSONE (DELTASONE) 5 MG tablet    tacrolimus (PROGRAF) 0.5 MG Cap    TRESIBA FLEXTOUCH U-100 100 unit/mL (3 mL) InPn    ergocalciferol (ERGOCALCIFEROL) 50,000 unit Cap    naloxone (NARCAN) 4 mg/actuation Spry    nitroGLYCERIN (NITROSTAT) 0.4 MG SL tablet    ondansetron (ZOFRAN-ODT) 4 MG TbDL    sodium bicarbonate 650 MG tablet     Family History     Problem Relation (Age of Onset)    Cancer Father    Diabetes Father    Heart failure Father, Mother    Stroke Father        Tobacco Use    Smoking status: Former Smoker     Packs/day: 1.00     Years: 40.00     Pack years: 40.00     Last attempt to quit: 2013     Years since quittin.9    Smokeless tobacco: Never Used   Substance and Sexual Activity    Alcohol use: Yes     Alcohol/week: 6.0 standard drinks     Types: 6 Cans of beer per week     Comment: seldom    Drug use: No    Sexual activity: Never     Review of Systems   Constitutional: " Negative.  Negative for activity change, appetite change, chills, diaphoresis, fatigue and fever.   HENT: Negative.  Negative for congestion and trouble swallowing.    Eyes: Negative.    Respiratory: Positive for shortness of breath. Negative for cough, chest tightness and wheezing.    Cardiovascular: Negative.  Negative for chest pain, palpitations and leg swelling.   Gastrointestinal: Negative.  Negative for abdominal distention, abdominal pain, nausea and vomiting.   Genitourinary: Negative.  Negative for decreased urine volume, difficulty urinating, dysuria, enuresis, flank pain, frequency, hematuria, penile swelling, scrotal swelling and urgency.   Musculoskeletal: Negative.  Negative for arthralgias, back pain, joint swelling and neck pain.   Skin: Negative for rash.   Neurological: Negative.  Negative for tremors, seizures and headaches.   Psychiatric/Behavioral: Negative.  Negative for confusion and sleep disturbance. The patient is not nervous/anxious.    All other systems reviewed and are negative.    Objective:     Vital Signs (Most Recent):  Temp: 97.7 °F (36.5 °C) (12/14/19 1031)  Pulse: 79 (12/14/19 1050)  Resp: 20 (12/14/19 1050)  BP: 132/84 (12/14/19 1050)  SpO2: 100 % (12/14/19 1050)  O2 Device (Oxygen Therapy): room air (12/14/19 1031) Vital Signs (24h Range):  Temp:  [97.7 °F (36.5 °C)] 97.7 °F (36.5 °C)  Pulse:  [79-84] 79  Resp:  [20] 20  SpO2:  [98 %-100 %] 100 %  BP: (121-132)/(80-84) 132/84     Weight: 98.3 kg (216 lb 12.8 oz) (12/14/19 1046)  Body mass index is 30.24 kg/m².  Body surface area is 2.22 meters squared.    No intake/output data recorded.    Physical Exam   Constitutional: He is oriented to person, place, and time. He appears well-developed and well-nourished. No distress.   HENT:   Head: Normocephalic and atraumatic.   Eyes: Pupils are equal, round, and reactive to light.   Neck: Normal range of motion. Neck supple. No tracheal deviation present. No thyromegaly present.    Cardiovascular: Normal rate, regular rhythm, normal heart sounds and intact distal pulses. Exam reveals no gallop and no friction rub.   No murmur heard.  Pulmonary/Chest: Effort normal and breath sounds normal. He has no wheezes. He has no rales.   Abdominal: Soft. He exhibits no mass. There is no tenderness. There is no rebound and no guarding.   No allograft tenderness    Musculoskeletal: Normal range of motion. He exhibits no edema.   S/p right BKA   s/p left transmetatarsal amputation   Lymphadenopathy:     He has no cervical adenopathy.   Neurological: He is alert and oriented to person, place, and time.   Skin: Skin is warm. Capillary refill takes less than 2 seconds. No rash noted. He is not diaphoretic. No erythema.   Left-sided scalp showing large tumor exfoliating covered by dressing   Psychiatric: He has a normal mood and affect. His behavior is normal. Judgment and thought content normal.   Nursing note and vitals reviewed.    Point of care ultrasound was performed to evaluate abnormal breathing pattern, volume status, cardiac status, evaluation for shortness of breath.    Patient was in supine position    Pulmonary ultrasound was evaluated in multiple sites including anterior chest bilaterally, A lines were positive.  B lines were negative. No evidence of pneumothorax.  No Pleural effusion detected   no atelectasis or pneumonia.      Point of care ultrasound of the heart shows no evidence of pericardial effusion.  Normal LV and RV function.  Normal valvular heart  function.    Point of care ultrasound of the inferior vena cava shows distended inferior vena cava patient was asked to sniff which shows no fluid overload ad and CVP of about 5-10          Significant Labs:  Cardiac Markers: No results for input(s): CKMB, TROPONINT, MYOGLOBIN in the last 168 hours.  CBC:   Recent Labs   Lab 12/14/19  1109   WBC 6.89   RBC 4.73   HGB 13.5*   HCT 43.0      MCV 91   MCH 28.5   MCHC 31.4*     CMP:    Recent Labs   Lab 12/14/19  1109   *   CALCIUM 9.3   ALBUMIN 3.4*   PROT 7.7      K 5.0   CO2 20*      BUN 20   CREATININE 1.6*   ALKPHOS 83   ALT 17   AST 29   BILITOT 0.7     All labs within the past 24 hours have been reviewed.    Significant Imaging:  X-Ray: Reviewed    Assessment/Plan:     Kidney transplant recipient  Creatinine is 1.6 no fluid overload.  Continue current conservative management with immunosuppressive therapy    Coronary artery disease of native artery of native heart with stable angina pectoris  Kidney transplant function is stable.  Most of the shortness of breath is coming from angina.  Further discussion will be per Cardiology.  No evidence of fluid overload or renal dysfunction.  Continue immunosuppressive therapy without change.        Thank you for your consult.     Avel Estrada MD   Nephrology  [facility] -

## 2019-12-14 NOTE — HPI
Patient is a 64-year-old male with type 1 diabetes and ESRD  S/p kidney transplant 5/21/2016.  His baseline creatinine has been ranging between 1.5 and 2.0.  Patient has severe peripheral vascular disease and coronary artery disease.  Last heart catheterization was done in July of 2019 showing tortuous arteries with 50% lesions and some distal lesions.  Last heart catheterization was performed by Dr. Valdez.  Patient was seen recently in the Cardiology Division( ) with symptoms of dyspnea on exertion.  Stress test was schedule for next week.  Patient comes to the emergency room with worsening shortness of breath.  Patient seen and evaluated in the emergency room at the bedside.  Diagnosis of stable versus unstable angina discussed with the patient and the emergency room physician Dr.A. Oleary        Patient has history of rejection in the kidney in 2016 the kidney biopsy is as follows:      Kidney Biopsy 6/1/2016: 16 glomeruli, ACR type 1 AVR type 2, significant microcirculatory changes, c4d negative, No DSA, 5 to10% fibrosis

## 2019-12-16 ENCOUNTER — TELEPHONE (OUTPATIENT)
Dept: CARDIOLOGY | Facility: CLINIC | Age: 64
End: 2019-12-16

## 2019-12-16 NOTE — TELEPHONE ENCOUNTER
Called and followed up with patient--states he had to take a dose of nitroglycerin on 12/15/19 due to left arm pain and chest pain--states he had some shortness of breath as well--states he is okay to today--

## 2019-12-18 ENCOUNTER — TELEPHONE (OUTPATIENT)
Dept: OTOLARYNGOLOGY | Facility: CLINIC | Age: 64
End: 2019-12-18

## 2019-12-18 ENCOUNTER — CLINICAL SUPPORT (OUTPATIENT)
Dept: CARDIOLOGY | Facility: CLINIC | Age: 64
End: 2019-12-18
Attending: INTERNAL MEDICINE
Payer: MEDICARE

## 2019-12-18 ENCOUNTER — HOSPITAL ENCOUNTER (OUTPATIENT)
Dept: RADIOLOGY | Facility: HOSPITAL | Age: 64
Discharge: HOME | End: 2019-12-18
Attending: INTERNAL MEDICINE
Payer: MEDICARE

## 2019-12-18 DIAGNOSIS — R07.89 OTHER CHEST PAIN: ICD-10-CM

## 2019-12-18 DIAGNOSIS — I25.118 CORONARY ARTERY DISEASE OF NATIVE ARTERY OF NATIVE HEART WITH STABLE ANGINA PECTORIS: ICD-10-CM

## 2019-12-18 LAB — DIASTOLIC DYSFUNCTION: NO

## 2019-12-18 PROCEDURE — 78452 NM MULTI PHARM STRESS CARDIAC COMPONENT: ICD-10-PCS | Mod: 26,HCNC,, | Performed by: INTERNAL MEDICINE

## 2019-12-18 PROCEDURE — 93015 NM MULTI PHARM STRESS CARDIAC COMPONENT: ICD-10-PCS | Mod: HCNC,S$GLB,, | Performed by: INTERNAL MEDICINE

## 2019-12-18 PROCEDURE — 78452 HT MUSCLE IMAGE SPECT MULT: CPT | Mod: 26,HCNC,, | Performed by: INTERNAL MEDICINE

## 2019-12-18 PROCEDURE — A9502 TC99M TETROFOSMIN: HCPCS | Mod: HCNC

## 2019-12-18 PROCEDURE — 93015 CV STRESS TEST SUPVJ I&R: CPT | Mod: HCNC,S$GLB,, | Performed by: INTERNAL MEDICINE

## 2019-12-18 NOTE — TELEPHONE ENCOUNTER
Received a request from [employer] for signature for equipment or supplies. Please review and print documents scanned into [employer] of chart from [employer] on today.

## 2019-12-20 ENCOUNTER — TELEPHONE (OUTPATIENT)
Dept: CARDIOLOGY | Facility: CLINIC | Age: 64
End: 2019-12-20

## 2019-12-20 NOTE — TELEPHONE ENCOUNTER
Spoke with patient let him know that stress test results are not in yet will call him as soon as we get them.

## 2019-12-22 ENCOUNTER — NURSE TRIAGE (OUTPATIENT)
Dept: ADMINISTRATIVE | Facility: CLINIC | Age: 64
End: 2019-12-22

## 2019-12-22 NOTE — TELEPHONE ENCOUNTER
Reason for Disposition   General information question, no triage required and triager able to answer question    Additional Information   Negative: [1] Caller is not with the adult (patient) AND [2] reporting urgent symptoms   Negative: Lab result questions   Negative: Medication questions   Negative: Caller can't be reached by phone   Negative: Caller has already spoken to PCP or another triager   Negative: RN needs further essential information from caller in order to complete triage   Negative: Requesting regular office appointment   Negative: [1] Caller requesting NON-URGENT health information AND [2] PCP's office is the best resource   Negative: Health Information question, no triage required and triager able to answer question    Protocols used: INFORMATION ONLY CALL-A-  kidney tx 5/2016  [facility]  CC; pt called re appt time ramy. appt time given.

## 2019-12-23 ENCOUNTER — TELEPHONE (OUTPATIENT)
Dept: OTOLARYNGOLOGY | Facility: CLINIC | Age: 64
End: 2019-12-23

## 2019-12-23 ENCOUNTER — TELEPHONE (OUTPATIENT)
Dept: CARDIOLOGY | Facility: CLINIC | Age: 64
End: 2019-12-23

## 2019-12-23 ENCOUNTER — OFFICE VISIT (OUTPATIENT)
Dept: RADIATION ONCOLOGY | Facility: CLINIC | Age: 64
End: 2019-12-23
Payer: MEDICARE

## 2019-12-23 VITALS
DIASTOLIC BLOOD PRESSURE: 80 MMHG | HEART RATE: 90 BPM | HEIGHT: 71 IN | BODY MASS INDEX: 30.48 KG/M2 | OXYGEN SATURATION: 99 % | WEIGHT: 217.69 LBS | SYSTOLIC BLOOD PRESSURE: 118 MMHG | TEMPERATURE: 97 F | RESPIRATION RATE: 18 BRPM

## 2019-12-23 DIAGNOSIS — R06.00 DYSPNEA, UNSPECIFIED TYPE: Primary | ICD-10-CM

## 2019-12-23 PROCEDURE — 99213 PR OFFICE/OUTPT VISIT, EST, LEVL III, 20-29 MIN: ICD-10-PCS | Mod: HCNC,S$GLB,, | Performed by: RADIOLOGY

## 2019-12-23 PROCEDURE — 1101F PT FALLS ASSESS-DOCD LE1/YR: CPT | Mod: HCNC,CPTII,S$GLB, | Performed by: RADIOLOGY

## 2019-12-23 PROCEDURE — 1125F AMNT PAIN NOTED PAIN PRSNT: CPT | Mod: HCNC,S$GLB,, | Performed by: RADIOLOGY

## 2019-12-23 PROCEDURE — 99213 OFFICE O/P EST LOW 20 MIN: CPT | Mod: HCNC,S$GLB,, | Performed by: RADIOLOGY

## 2019-12-23 PROCEDURE — 1101F PR PT FALLS ASSESS DOC 0-1 FALLS W/OUT INJ PAST YR: ICD-10-PCS | Mod: HCNC,CPTII,S$GLB, | Performed by: RADIOLOGY

## 2019-12-23 PROCEDURE — 3079F PR MOST RECENT DIASTOLIC BLOOD PRESSURE 80-89 MM HG: ICD-10-PCS | Mod: HCNC,CPTII,S$GLB, | Performed by: RADIOLOGY

## 2019-12-23 PROCEDURE — 99999 PR PBB SHADOW E&M-EST. PATIENT-LVL V: ICD-10-PCS | Mod: PBBFAC,HCNC,, | Performed by: RADIOLOGY

## 2019-12-23 PROCEDURE — 3074F SYST BP LT 130 MM HG: CPT | Mod: HCNC,CPTII,S$GLB, | Performed by: RADIOLOGY

## 2019-12-23 PROCEDURE — 99999 PR PBB SHADOW E&M-EST. PATIENT-LVL V: CPT | Mod: PBBFAC,HCNC,, | Performed by: RADIOLOGY

## 2019-12-23 PROCEDURE — 1159F PR MEDICATION LIST DOCUMENTED IN MEDICAL RECORD: ICD-10-PCS | Mod: HCNC,S$GLB,, | Performed by: RADIOLOGY

## 2019-12-23 PROCEDURE — 3079F DIAST BP 80-89 MM HG: CPT | Mod: HCNC,CPTII,S$GLB, | Performed by: RADIOLOGY

## 2019-12-23 PROCEDURE — 3074F PR MOST RECENT SYSTOLIC BLOOD PRESSURE < 130 MM HG: ICD-10-PCS | Mod: HCNC,CPTII,S$GLB, | Performed by: RADIOLOGY

## 2019-12-23 PROCEDURE — 1159F MED LIST DOCD IN RCRD: CPT | Mod: HCNC,S$GLB,, | Performed by: RADIOLOGY

## 2019-12-23 PROCEDURE — 1125F PR PAIN SEVERITY QUANTIFIED, PAIN PRESENT: ICD-10-PCS | Mod: HCNC,S$GLB,, | Performed by: RADIOLOGY

## 2019-12-23 NOTE — PROGRESS NOTES
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 8/15/19 shave biopsy returned squamous cell carcinoma.   2. 2019 he had an ST-elevation MI.   3. 19 wide local excision left temple with circumferential advancement flap. Pathology invasive squamous cell carcinoma, keratinizing measuring 6.5 cm, completely excised with negative margins. No lymphovascular or perineural invasion. Moderately differentiated. Tumor was 0.4 mm from deep margin. It was widely excised from peripheral margins.   4. 19 completed 50 Gy in 20 fractions left temple adjuvant radiation     CURRENT STATUS: Lavelle Ladd is a pleasant 64 y.o. male who presents today for follow-up.  He is getting home wound care 3 times a week for the left scalp.  He feels like it is healing well.  He went to the ER about a week ago which shortness of breath and left arm pain.  He was discharged.  He has had a stress test and a follow-up with cardiology is pending.    PAST MEDICAL HISTORY:  Past Medical History:   Diagnosis Date    DINORAH (acute kidney injury) 2016    Arthritis     CAD in native artery 2019    CHF (congestive heart failure)     Chronic obstructive pulmonary disease 2016    Coronary artery disease involving native coronary artery of native heart without angina pectoris 2016    CRI (chronic renal insufficiency) 2019     donor kidney transplant for DM 16     Induction with Thymo x3 and IV solumedrol to total 875mg  Kidney Biopsy  2016: 16 glomeruli, ACR type 1 AVR type 2, significant microcirculatory changes, c4d negative, No DSA, 5 to10% fibrosis. Treated with thymo x8 2016- no rejection      Diastolic heart failure     Encounter for blood transfusion     ESRD on RRT since 10/2013 10/29/2013     Biopsy proven diabetic nephropathy and lymphoplasmacytic interstitial infiltrate not c/w with AIN (ddx sjogrens or assoc with tamm-horsefall protein extravasation)     GERD (gastroesophageal reflux disease)     History of hepatitis C, s/p successful Rx w/ SVR12 - 4/2017 4/5/2017    Completed 12 weeks harvoni w/ SVR    Hyperlipidemia     Hypertension     PAD (peripheral artery disease) 7/21/2019    PIC line (peripherally inserted central catheter) flush     Prophylactic immunotherapy     Proteinuria     PVD (peripheral vascular disease) 6/26/2017    RLE BKA CT 12/11/16 Extensive atherosclerotic disease of the aorta and mesenteric arteries.     Renal hypertension     Type 2 diabetes mellitus with diabetic neuropathy, with long-term current use of insulin 12/1/2016    Vitamin B12 deficiency        PAST SURGICAL HISTORY:  Past Surgical History:   Procedure Laterality Date    AORTOGRAPHY WITH SERIALOGRAPHY N/A 6/14/2018    Procedure: LEFT LEG ANGIOGRAM;  Surgeon: Donal Mcdonald MD;  Location: [facility] CATH LAB;  Service: Vascular;  Laterality: N/A;    av bovine graft      Left UE    AV FISTULA PLACEMENT      left UE    CARDIAC CATHETERIZATION  02/2015    CLOSURE OF WOUND Left 9/24/2018    Procedure: CLOSURE, WOUND;  Surgeon: Karla Wheeler DPM;  Location: [facility] OR;  Service: Podiatry;  Laterality: Left;  Secondary Wound closure, extensive    CLOSURE OF WOUND Left 11/5/2018    Procedure: CLOSURE, WOUND;  Surgeon: Karla Wheeler DPM;  Location: [facility] OR;  Service: Podiatry;  Laterality: Left;    DEBRIDEMENT OF MULTIPLE METATARSAL BONES Left 11/5/2018    Procedure: DEBRIDEMENT, METATARSAL BONE, 2 OR MORE BONES;  Surgeon: Karla Wheeler DPM;  Location: [facility] OR;  Service: Podiatry;  Laterality: Left;    EXCISION OF SKIN Left 9/27/2019    Procedure: EXCISION, SKIN;  Surgeon: Lenard Alarcon MD;  [address]  Service: Plastics;  Laterality: Left;  Plastics set, NIMS monitor, [facility]     FOOT AMPUTATION THROUGH METATARSAL Left 9/21/2018    Procedure: AMPUTATION, FOOT, TRANSMETATARSAL;  Surgeon: Karla Wheeler DPM;  Location: [facility] OR;  Service: Podiatry;  Laterality: Left;    FOOT AMPUTATION THROUGH METATARSAL Left 10/31/2018    Procedure: AMPUTATION, FOOT, TRANSMETATARSAL;  Surgeon: Karla Wheeler DPM;  Location: [facility] OR;  Service: Podiatry;  Laterality: Left;    FOOT AMPUTATION THROUGH METATARSAL Left 11/5/2018    Procedure: AMPUTATION, FOOT, TRANSMETATARSAL;  Surgeon: Karla Wheeler DPM;  Location: [facility] OR;  Service: Podiatry;  Laterality: Left;  revisional transmetatarsal amputation, Left foot    IRRIGATION AND DEBRIDEMENT OF LOWER EXTREMITY Left 10/31/2018    Procedure: IRRIGATION AND DEBRIDEMENT, LOWER EXTREMITY;  Surgeon: Karla Wheeler DPM;  Location: [facility] OR;  Service: Podiatry;  Laterality: Left;    KIDNEY TRANSPLANT  05/21/2016    LEFT HEART CATHETERIZATION Left 7/21/2019    Procedure: CATHETERIZATION, HEART, LEFT;  Surgeon: Andrew Valdez MD;  Location: [facility] CATH LAB;  Service: Cardiology;  Laterality: Left;    LEG AMPUTATION THROUGH KNEE  2011    right LE, started as nail puncture leading to diabetic ulcer    SKIN FULL THICKNESS GRAFT Left 10/7/2019    Procedure: APPLICATION, GRAFT, SKIN, FULL-THICKNESS;  Surgeon: Lenard Alarcon MD;  Location: [address];  Service: Plastics;  Laterality: Left;    SURGICAL REMOVAL OF LESION OF FACE Right 10/7/2019    Procedure: EXCISION, LESION, FACE;  Surgeon: Lenard Alarcon MD;  Location: [address];  Service: Plastics;  Laterality: Right;       SOCIAL HISTORY:  Social History     Socioeconomic History    Marital status: Single     Spouse name: Not on file    Number of children: Not on file    Years of education: Not on file    Highest education level: Not on file   Occupational History    Occupation: Disabled     Employer: DISABLED   Social Needs    Financial resource strain: Not on file    Food  "insecurity:     Worry: Not on file     Inability: Not on file    Transportation needs:     Medical: Not on file     Non-medical: Not on file   Tobacco Use    Smoking status: Former Smoker     Packs/day: 1.00     Years: 40.00     Pack years: 40.00     Last attempt to quit: 2013     Years since quittin.9    Smokeless tobacco: Never Used   Substance and Sexual Activity    Alcohol use: Yes     Alcohol/week: 6.0 standard drinks     Types: 6 Cans of beer per week     Comment: seldom    Drug use: No    Sexual activity: Never   Lifestyle    Physical activity:     Days per week: Not on file     Minutes per session: Not on file    Stress: Not on file   Relationships    Social connections:     Talks on phone: Not on file     Gets together: Not on file     Attends Jainism service: Not on file     Active member of club or organization: Not on file     Attends meetings of clubs or organizations: Not on file     Relationship status: Not on file   Other Topics Concern    Not on file   Social History Narrative    Lives alone. Retired from construction and various jobs, now retired. Would like to return to work PT to alleviate boredom.       FAMILY HISTORY:  Family History   Problem Relation Age of Onset    Cancer Father     Diabetes Father     Heart failure Father     Stroke Father     Heart failure Mother     Kidney disease Neg Hx        PHYSICAL EXAMINATION:  Constitutional/Vitals: well appearing, no acute distress, ECOG 2 - Ambulates, capable of self care only, /80   Pulse 90   Temp 96.9 °F (36.1 °C)   Resp 18   Ht 5' 11" (1.803 m)   Wt 98.7 kg (217 lb 11.2 oz)   SpO2 99%   BMI 30.36 kg/m²    ENT:  Dressed left scalp wound healing well, resolved radiation dermatitis  Lymphatic: no cervical, supraclavicular or parotid adenopathy    IMAGING AND LABORATORY FINDINGS: As per HPI; images, labs and medical records reviewed personally in [facility].    ASSESSMENT:  Recovering well from acute radiation " toxicity    PLAN:  Will continue wound care 3 times a week with home health for another 2 weeks or so.  He is healing slowly but reasonably well.  He may need to wear a bandage to avoid scratching the wound at night.  I referred him to pulmonology for shortness of breath.  He will see Cardiology in about 2 weeks.  I will have him see Dr. Sandoval in 3 months.  I will also have him see Dr. Alarcon in [place].    STEPHANIE Rodríguez M.D.  Radiation Oncology  [facility] [address] [place] [address]  Ph: [phone]  [email or web address]

## 2019-12-23 NOTE — TELEPHONE ENCOUNTER
Returned call to Zane with [facility]--left voice message to call our office back at [phone].

## 2019-12-23 NOTE — TELEPHONE ENCOUNTER
Zane from [facility] contacted the office on behalf of the patient; to find out if the stress test results have been posted. Zane was advised that  stress test results have not been posted and will contact the patient as soon as they are posted to his chart. The patient stated understanding with no questions or concern.

## 2019-12-23 NOTE — TELEPHONE ENCOUNTER
----- Message from Anuradha Felix sent at 12/23/2019 11:08 AM CST -----  Contact: Jules riddle [facility]  Mr nicole states that patient told him that he was suppose to have a referral for a Pulmonologist and Mr Nicole is checking on status of that. Also Mr Nicole states that patient is developing sleep apnea and needs to be seen as soon as possible. Please call him back at [phone]

## 2019-12-23 NOTE — TELEPHONE ENCOUNTER
----- Message from Grecia Renee RN sent at 12/23/2019  4:36 PM CST -----      ----- Message -----  From: Chapin Rodríguez II, MD  Sent: 12/23/2019   3:59 PM CST  To: Agnes Weinberg Staff    Please schedule follow up with this patient in [place].  We will alternate

## 2019-12-27 ENCOUNTER — TELEPHONE (OUTPATIENT)
Dept: OTOLARYNGOLOGY | Facility: CLINIC | Age: 64
End: 2019-12-27

## 2019-12-27 ENCOUNTER — PATIENT MESSAGE (OUTPATIENT)
Dept: PULMONOLOGY | Facility: CLINIC | Age: 64
End: 2019-12-27

## 2019-12-27 NOTE — TELEPHONE ENCOUNTER
Patient refused appt with Dr. Alarcon for 1/3 states that is not a good date for him. He scheduled for 2/7/20.

## 2019-12-30 ENCOUNTER — HOSPITAL ENCOUNTER (INPATIENT)
Facility: HOSPITAL | Age: 64
LOS: 3 days | Discharge: HOME HEALTH | DRG: 194 | End: 2020-01-02
Attending: EMERGENCY MEDICINE | Admitting: INTERNAL MEDICINE
Payer: MEDICARE

## 2019-12-30 DIAGNOSIS — R06.02 SOB (SHORTNESS OF BREATH): ICD-10-CM

## 2019-12-30 DIAGNOSIS — J18.9 PNEUMONIA OF BOTH LUNGS DUE TO INFECTIOUS ORGANISM: ICD-10-CM

## 2019-12-30 DIAGNOSIS — Z78.9 FAILURE OF OUTPATIENT TREATMENT: ICD-10-CM

## 2019-12-30 DIAGNOSIS — J18.9 PNEUMONIA OF BOTH LUNGS DUE TO INFECTIOUS ORGANISM, UNSPECIFIED PART OF LUNG: Primary | ICD-10-CM

## 2019-12-30 DIAGNOSIS — R06.03 RESPIRATORY DISTRESS: ICD-10-CM

## 2019-12-30 DIAGNOSIS — R07.9 CHEST PAIN: ICD-10-CM

## 2019-12-30 LAB
ALBUMIN SERPL BCP-MCNC: 3.8 G/DL (ref 3.5–5.2)
ALLENS TEST: ABNORMAL
ALP SERPL-CCNC: 72 U/L (ref 55–135)
ALT SERPL W/O P-5'-P-CCNC: 60 U/L (ref 10–44)
ANION GAP SERPL CALC-SCNC: 15 MMOL/L (ref 8–16)
AST SERPL-CCNC: 48 U/L (ref 10–40)
BASOPHILS # BLD AUTO: 0.01 K/UL (ref 0–0.2)
BASOPHILS NFR BLD: 0.2 % (ref 0–1.9)
BILIRUB SERPL-MCNC: 0.5 MG/DL (ref 0.1–1)
BNP SERPL-MCNC: 52 PG/ML (ref 0–99)
BUN SERPL-MCNC: 19 MG/DL (ref 8–23)
CALCIUM SERPL-MCNC: 9.3 MG/DL (ref 8.7–10.5)
CHLORIDE SERPL-SCNC: 102 MMOL/L (ref 95–110)
CO2 SERPL-SCNC: 21 MMOL/L (ref 23–29)
CREAT SERPL-MCNC: 1.6 MG/DL (ref 0.5–1.4)
DELSYS: ABNORMAL
DIFFERENTIAL METHOD: ABNORMAL
EOSINOPHIL # BLD AUTO: 0 K/UL (ref 0–0.5)
EOSINOPHIL NFR BLD: 0.5 % (ref 0–8)
ERYTHROCYTE [DISTWIDTH] IN BLOOD BY AUTOMATED COUNT: 13.8 % (ref 11.5–14.5)
EST. GFR  (AFRICAN AMERICAN): 51 ML/MIN/1.73 M^2
EST. GFR  (NON AFRICAN AMERICAN): 44 ML/MIN/1.73 M^2
FIO2: 28
FLOW: 2
GLUCOSE SERPL-MCNC: 143 MG/DL (ref 70–110)
HCO3 UR-SCNC: 24.3 MMOL/L (ref 24–28)
HCT VFR BLD AUTO: 44.2 % (ref 40–54)
HGB BLD-MCNC: 13.9 G/DL (ref 14–18)
IMM GRANULOCYTES # BLD AUTO: 0.01 K/UL (ref 0–0.04)
IMM GRANULOCYTES NFR BLD AUTO: 0.2 % (ref 0–0.5)
LACTATE SERPL-SCNC: 1.8 MMOL/L (ref 0.5–2.2)
LACTATE SERPL-SCNC: 2.2 MMOL/L (ref 0.5–2.2)
LYMPHOCYTES # BLD AUTO: 1.3 K/UL (ref 1–4.8)
LYMPHOCYTES NFR BLD: 23.3 % (ref 18–48)
MCH RBC QN AUTO: 28.3 PG (ref 27–31)
MCHC RBC AUTO-ENTMCNC: 31.4 G/DL (ref 32–36)
MCV RBC AUTO: 90 FL (ref 82–98)
MODE: ABNORMAL
MONOCYTES # BLD AUTO: 0.4 K/UL (ref 0.3–1)
MONOCYTES NFR BLD: 7.6 % (ref 4–15)
NEUTROPHILS # BLD AUTO: 3.8 K/UL (ref 1.8–7.7)
NEUTROPHILS NFR BLD: 68.2 % (ref 38–73)
NRBC BLD-RTO: 0 /100 WBC
PCO2 BLDA: 37.7 MMHG (ref 35–45)
PH SMN: 7.42 [PH] (ref 7.35–7.45)
PLATELET # BLD AUTO: 193 K/UL (ref 150–350)
PMV BLD AUTO: 9.9 FL (ref 9.2–12.9)
PO2 BLDA: 102 MMHG (ref 80–100)
POC BE: 0 MMOL/L
POC SATURATED O2: 98 % (ref 95–100)
POTASSIUM SERPL-SCNC: 4.4 MMOL/L (ref 3.5–5.1)
PROCALCITONIN SERPL IA-MCNC: 0.03 NG/ML
PROT SERPL-MCNC: 7.6 G/DL (ref 6–8.4)
RBC # BLD AUTO: 4.92 M/UL (ref 4.6–6.2)
SAMPLE: ABNORMAL
SITE: ABNORMAL
SODIUM SERPL-SCNC: 138 MMOL/L (ref 136–145)
TROPONIN I SERPL DL<=0.01 NG/ML-MCNC: <0.006 NG/ML (ref 0–0.03)
WBC # BLD AUTO: 5.53 K/UL (ref 3.9–12.7)

## 2019-12-30 PROCEDURE — 99285 EMERGENCY DEPT VISIT HI MDM: CPT | Mod: 25,HCNC

## 2019-12-30 PROCEDURE — 96374 THER/PROPH/DIAG INJ IV PUSH: CPT | Mod: HCNC

## 2019-12-30 PROCEDURE — 93010 ELECTROCARDIOGRAM REPORT: CPT | Mod: HCNC,,, | Performed by: INTERNAL MEDICINE

## 2019-12-30 PROCEDURE — 85025 COMPLETE CBC W/AUTO DIFF WBC: CPT | Mod: HCNC

## 2019-12-30 PROCEDURE — 84484 ASSAY OF TROPONIN QUANT: CPT | Mod: HCNC

## 2019-12-30 PROCEDURE — 83605 ASSAY OF LACTIC ACID: CPT | Mod: 91,HCNC

## 2019-12-30 PROCEDURE — 93005 ELECTROCARDIOGRAM TRACING: CPT | Mod: HCNC

## 2019-12-30 PROCEDURE — 99900035 HC TECH TIME PER 15 MIN (STAT): Mod: HCNC

## 2019-12-30 PROCEDURE — 82803 BLOOD GASES ANY COMBINATION: CPT | Mod: HCNC

## 2019-12-30 PROCEDURE — 25000242 PHARM REV CODE 250 ALT 637 W/ HCPCS: Mod: HCNC | Performed by: EMERGENCY MEDICINE

## 2019-12-30 PROCEDURE — 21400001 HC TELEMETRY ROOM: Mod: HCNC

## 2019-12-30 PROCEDURE — 83880 ASSAY OF NATRIURETIC PEPTIDE: CPT | Mod: HCNC

## 2019-12-30 PROCEDURE — 36600 WITHDRAWAL OF ARTERIAL BLOOD: CPT | Mod: HCNC

## 2019-12-30 PROCEDURE — 80053 COMPREHEN METABOLIC PANEL: CPT | Mod: HCNC

## 2019-12-30 PROCEDURE — 63600175 PHARM REV CODE 636 W HCPCS: Mod: HCNC | Performed by: EMERGENCY MEDICINE

## 2019-12-30 PROCEDURE — 63600175 PHARM REV CODE 636 W HCPCS: Mod: HCNC | Performed by: NURSE PRACTITIONER

## 2019-12-30 PROCEDURE — 25000003 PHARM REV CODE 250: Mod: HCNC | Performed by: NURSE PRACTITIONER

## 2019-12-30 PROCEDURE — 87040 BLOOD CULTURE FOR BACTERIA: CPT | Mod: 59,HCNC

## 2019-12-30 PROCEDURE — 36415 COLL VENOUS BLD VENIPUNCTURE: CPT | Mod: HCNC

## 2019-12-30 PROCEDURE — 93010 EKG 12-LEAD: ICD-10-PCS | Mod: HCNC,,, | Performed by: INTERNAL MEDICINE

## 2019-12-30 PROCEDURE — 94640 AIRWAY INHALATION TREATMENT: CPT | Mod: HCNC

## 2019-12-30 PROCEDURE — 96375 TX/PRO/DX INJ NEW DRUG ADDON: CPT | Mod: 59,HCNC

## 2019-12-30 PROCEDURE — 96372 THER/PROPH/DIAG INJ SC/IM: CPT | Mod: HCNC

## 2019-12-30 PROCEDURE — 11000001 HC ACUTE MED/SURG PRIVATE ROOM: Mod: HCNC

## 2019-12-30 PROCEDURE — 84145 PROCALCITONIN (PCT): CPT | Mod: HCNC

## 2019-12-30 RX ORDER — IBUPROFEN 200 MG
16 TABLET ORAL
Status: DISCONTINUED | OUTPATIENT
Start: 2019-12-31 | End: 2020-01-02 | Stop reason: HOSPADM

## 2019-12-30 RX ORDER — HEPARIN SODIUM 5000 [USP'U]/ML
5000 INJECTION, SOLUTION INTRAVENOUS; SUBCUTANEOUS EVERY 8 HOURS
Status: DISCONTINUED | OUTPATIENT
Start: 2019-12-30 | End: 2020-01-02 | Stop reason: HOSPADM

## 2019-12-30 RX ORDER — ASPIRIN 81 MG/1
81 TABLET ORAL DAILY
Status: DISCONTINUED | OUTPATIENT
Start: 2019-12-31 | End: 2020-01-02 | Stop reason: HOSPADM

## 2019-12-30 RX ORDER — ONDANSETRON 4 MG/1
4 TABLET, ORALLY DISINTEGRATING ORAL EVERY 8 HOURS PRN
Status: DISCONTINUED | OUTPATIENT
Start: 2019-12-30 | End: 2020-01-02 | Stop reason: HOSPADM

## 2019-12-30 RX ORDER — CLOPIDOGREL BISULFATE 75 MG/1
75 TABLET ORAL DAILY
Status: DISCONTINUED | OUTPATIENT
Start: 2019-12-31 | End: 2020-01-02 | Stop reason: HOSPADM

## 2019-12-30 RX ORDER — IPRATROPIUM BROMIDE AND ALBUTEROL SULFATE 2.5; .5 MG/3ML; MG/3ML
3 SOLUTION RESPIRATORY (INHALATION)
Status: DISCONTINUED | OUTPATIENT
Start: 2019-12-30 | End: 2019-12-30

## 2019-12-30 RX ORDER — ATORVASTATIN CALCIUM 40 MG/1
80 TABLET, FILM COATED ORAL DAILY
Status: DISCONTINUED | OUTPATIENT
Start: 2019-12-31 | End: 2020-01-02 | Stop reason: HOSPADM

## 2019-12-30 RX ORDER — METOPROLOL TARTRATE 25 MG/1
25 TABLET, FILM COATED ORAL 2 TIMES DAILY
Status: DISCONTINUED | OUTPATIENT
Start: 2019-12-30 | End: 2020-01-02 | Stop reason: HOSPADM

## 2019-12-30 RX ORDER — BUDESONIDE 0.5 MG/2ML
0.5 INHALANT ORAL EVERY 12 HOURS
Status: DISCONTINUED | OUTPATIENT
Start: 2019-12-31 | End: 2020-01-02 | Stop reason: HOSPADM

## 2019-12-30 RX ORDER — ARFORMOTEROL TARTRATE 15 UG/2ML
15 SOLUTION RESPIRATORY (INHALATION) 2 TIMES DAILY
Status: DISCONTINUED | OUTPATIENT
Start: 2019-12-31 | End: 2020-01-02 | Stop reason: HOSPADM

## 2019-12-30 RX ORDER — LEVALBUTEROL INHALATION SOLUTION 0.63 MG/3ML
1.26 SOLUTION RESPIRATORY (INHALATION)
Status: DISCONTINUED | OUTPATIENT
Start: 2019-12-30 | End: 2019-12-30

## 2019-12-30 RX ORDER — NITROGLYCERIN 0.4 MG/1
0.4 TABLET SUBLINGUAL EVERY 5 MIN PRN
Status: DISCONTINUED | OUTPATIENT
Start: 2019-12-30 | End: 2020-01-02 | Stop reason: HOSPADM

## 2019-12-30 RX ORDER — SODIUM CHLORIDE 0.9 % (FLUSH) 0.9 %
10 SYRINGE (ML) INJECTION
Status: DISCONTINUED | OUTPATIENT
Start: 2019-12-30 | End: 2020-01-02 | Stop reason: HOSPADM

## 2019-12-30 RX ORDER — INSULIN ASPART 100 [IU]/ML
0-5 INJECTION, SOLUTION INTRAVENOUS; SUBCUTANEOUS
Status: DISCONTINUED | OUTPATIENT
Start: 2019-12-31 | End: 2020-01-02 | Stop reason: HOSPADM

## 2019-12-30 RX ORDER — LEVALBUTEROL INHALATION SOLUTION 0.63 MG/3ML
0.63 SOLUTION RESPIRATORY (INHALATION) EVERY 8 HOURS
Status: DISCONTINUED | OUTPATIENT
Start: 2019-12-31 | End: 2020-01-02 | Stop reason: HOSPADM

## 2019-12-30 RX ORDER — IBUPROFEN 200 MG
24 TABLET ORAL
Status: DISCONTINUED | OUTPATIENT
Start: 2019-12-31 | End: 2020-01-02 | Stop reason: HOSPADM

## 2019-12-30 RX ORDER — GABAPENTIN 300 MG/1
300 CAPSULE ORAL 3 TIMES DAILY
Status: DISCONTINUED | OUTPATIENT
Start: 2019-12-31 | End: 2020-01-02 | Stop reason: HOSPADM

## 2019-12-30 RX ORDER — ISOSORBIDE MONONITRATE 30 MG/1
60 TABLET, EXTENDED RELEASE ORAL DAILY
Status: DISCONTINUED | OUTPATIENT
Start: 2019-12-31 | End: 2020-01-02 | Stop reason: HOSPADM

## 2019-12-30 RX ORDER — HYDROCODONE BITARTRATE AND ACETAMINOPHEN 10; 325 MG/1; MG/1
1 TABLET ORAL EVERY 6 HOURS PRN
Status: DISCONTINUED | OUTPATIENT
Start: 2019-12-30 | End: 2020-01-02 | Stop reason: HOSPADM

## 2019-12-30 RX ORDER — AMLODIPINE BESYLATE 10 MG/1
10 TABLET ORAL DAILY
Status: DISCONTINUED | OUTPATIENT
Start: 2019-12-31 | End: 2020-01-02 | Stop reason: HOSPADM

## 2019-12-30 RX ORDER — ALBUTEROL SULFATE 0.83 MG/ML
2.5 SOLUTION RESPIRATORY (INHALATION) EVERY 4 HOURS PRN
Status: DISCONTINUED | OUTPATIENT
Start: 2019-12-30 | End: 2020-01-02 | Stop reason: HOSPADM

## 2019-12-30 RX ORDER — GLUCAGON 1 MG
1 KIT INJECTION
Status: DISCONTINUED | OUTPATIENT
Start: 2019-12-31 | End: 2020-01-02 | Stop reason: HOSPADM

## 2019-12-30 RX ADMIN — LORAZEPAM 1 MG: 2 INJECTION INTRAMUSCULAR; INTRAVENOUS at 07:12

## 2019-12-30 RX ADMIN — HEPARIN SODIUM 5000 UNITS: 5000 INJECTION INTRAVENOUS; SUBCUTANEOUS at 09:12

## 2019-12-30 RX ADMIN — VANCOMYCIN HYDROCHLORIDE 2000 MG: 100 INJECTION, POWDER, LYOPHILIZED, FOR SOLUTION INTRAVENOUS at 07:12

## 2019-12-30 RX ADMIN — PIPERACILLIN AND TAZOBACTAM 4.5 G: 4; .5 INJECTION, POWDER, LYOPHILIZED, FOR SOLUTION INTRAVENOUS; PARENTERAL at 07:12

## 2019-12-30 RX ADMIN — IPRATROPIUM BROMIDE AND ALBUTEROL SULFATE 3 ML: .5; 3 SOLUTION RESPIRATORY (INHALATION) at 06:12

## 2019-12-30 RX ADMIN — METOPROLOL TARTRATE 25 MG: 25 TABLET ORAL at 09:12

## 2019-12-30 NOTE — ED PROVIDER NOTES
5:10 PM: SOB, pneumonia.  [facility] I explained to pt that due to lack of available rooms pt was seen by myself to begin the workup. Pt understands they will be seen and dispositioned by the next available physician. Pt voices understanding and agrees with plan. Pt also understands the longer than normal wait time. I am removing myself from the care of pt and pt will now be assigned to the next available physician.     Lamar Montgomery PA-C  5:10 PM    SCRIBE #1 NOTE: I, Wade Wood, am scribing for, and in the presence of, Chandu Harris Jr., MD. I have scribed the entire note.       History     Chief Complaint   Patient presents with    Shortness of Breath     dx with pneumonia yesterday. pt reports increased difficulty breathing.      Review of patient's allergies indicates:  No Known Allergies      History of Present Illness     HPI    12/30/2019, 5:50 PM   History obtained from the patient      History of Present Illness: Lavelle Ladd is a 64 y.o. male patient with a PMHx of kidney transplant, CHF, CAD, ESRD, GERD, PAD, RLE BKE, and DM who presents to the Emergency Department for evaluation of SOB which onset gradually yesterday. Pt reported to the ED yesterday and was diagnosed with pneumonia and states he took abd and breathing treatments with no relief. Symptoms are worsening and moderate in severity. No mitigating or exacerbating factors reported. Associated sxs include CP, L arm pain, and R calf pain. Patient denies any fever, diaphoresis, CP, abd pain, N/V, back pain, HA, weakness, and all other sxs at this time. No prior Tx reported. Pt reports he had a heart attack in October. No further complaints or concerns at this time.        Arrival mode: Personal vehicle      PCP: Rishabh Esteban MD        Past Medical History:  Past Medical History:   Diagnosis Date    DINORAH (acute kidney injury) 9/25/2016    Arthritis     CAD in native artery 7/21/2019    CHF (congestive heart  failure)     Chronic obstructive pulmonary disease 2016    Coronary artery disease involving native coronary artery of native heart without angina pectoris 2016    CRI (chronic renal insufficiency) 2019     donor kidney transplant for DM 16     Induction with Thymo x3 and IV solumedrol to total 875mg  Kidney Biopsy  2016: 16 glomeruli, ACR type 1 AVR type 2, significant microcirculatory changes, c4d negative, No DSA, 5 to10% fibrosis. Treated with thymo x8 2016- no rejection      Diastolic heart failure     Encounter for blood transfusion     ESRD on RRT since 10/2013 10/29/2013    Biopsy proven diabetic nephropathy and lymphoplasmacytic interstitial infiltrate not c/w with AIN (ddx sjogrens or assoc with tamm-horsefall protein extravasation)     GERD (gastroesophageal reflux disease)     History of hepatitis C, s/p successful Rx w/ SVR12 - 2017    Completed 12 weeks harvoni w/ SVR    Hyperlipidemia     Hypertension     PAD (peripheral artery disease) 2019    PIC line (peripherally inserted central catheter) flush     Prophylactic immunotherapy     Proteinuria     PVD (peripheral vascular disease) 2017    RLE BKA CT 16 Extensive atherosclerotic disease of the aorta and mesenteric arteries.     Renal hypertension     Type 2 diabetes mellitus with diabetic neuropathy, with long-term current use of insulin 2016    Vitamin B12 deficiency        Past Surgical History:  Past Surgical History:   Procedure Laterality Date    AORTOGRAPHY WITH SERIALOGRAPHY N/A 2018    Procedure: LEFT LEG ANGIOGRAM;  Surgeon: Donal Mcdonald MD;  Location: [facility] CATH LAB;  Service: Vascular;  Laterality: N/A;    av bovine graft      Left UE    AV FISTULA PLACEMENT      left UE    CARDIAC CATHETERIZATION  2015    CLOSURE OF WOUND Left 2018    Procedure: CLOSURE, WOUND;  Surgeon: Karla Wheeler DPM;  Location: [facility] OR;  Service: Podiatry;   Laterality: Left;  Secondary Wound closure, extensive    CLOSURE OF WOUND Left 11/5/2018    Procedure: CLOSURE, WOUND;  Surgeon: Karla Wheeler DPM;  Location: [facility] OR;  Service: Podiatry;  Laterality: Left;    DEBRIDEMENT OF MULTIPLE METATARSAL BONES Left 11/5/2018    Procedure: DEBRIDEMENT, METATARSAL BONE, 2 OR MORE BONES;  Surgeon: Karla Wheeler DPM;  Location: [facility] OR;  Service: Podiatry;  Laterality: Left;    EXCISION OF SKIN Left 9/27/2019    Procedure: EXCISION, SKIN;  Surgeon: Lenard Alarcon MD;  [address]  Service: Plastics;  Laterality: Left;  Plastics set, NIMS monitor, [employer]Cell    FOOT AMPUTATION THROUGH METATARSAL Left 9/21/2018    Procedure: AMPUTATION, FOOT, TRANSMETATARSAL;  Surgeon: Karla Wheeler DPM;  Location: [facility] OR;  Service: Podiatry;  Laterality: Left;    FOOT AMPUTATION THROUGH METATARSAL Left 10/31/2018    Procedure: AMPUTATION, FOOT, TRANSMETATARSAL;  Surgeon: Karla Wheeler DPM;  Location: [facility] OR;  Service: Podiatry;  Laterality: Left;    FOOT AMPUTATION THROUGH METATARSAL Left 11/5/2018    Procedure: AMPUTATION, FOOT, TRANSMETATARSAL;  Surgeon: Karla Wheeler DPM;  Location: [facility] OR;  Service: Podiatry;  Laterality: Left;  revisional transmetatarsal amputation, Left foot    IRRIGATION AND DEBRIDEMENT OF LOWER EXTREMITY Left 10/31/2018    Procedure: IRRIGATION AND DEBRIDEMENT, LOWER EXTREMITY;  Surgeon: Karla Wheeler DPM;  Location: [facility] OR;  Service: Podiatry;  Laterality: Left;    KIDNEY TRANSPLANT  05/21/2016    LEFT HEART CATHETERIZATION Left 7/21/2019    Procedure: CATHETERIZATION, HEART, LEFT;  Surgeon: Andrew Valdez MD;  Location: [facility] CATH LAB;  Service: Cardiology;  Laterality: Left;    LEG AMPUTATION THROUGH KNEE  2011    right LE, started as nail puncture leading to diabetic ulcer    SKIN FULL THICKNESS GRAFT Left 10/7/2019    Procedure: APPLICATION, GRAFT, SKIN, FULL-THICKNESS;  Surgeon: Lenard Alarcon MD;   Location: [address];  Service: Plastics;  Laterality: Left;    SURGICAL REMOVAL OF LESION OF FACE Right 10/7/2019    Procedure: EXCISION, LESION, FACE;  Surgeon: Lenard Alarcon MD;  Location: [address];  Service: Plastics;  Laterality: Right;         Family History:  Family History   Problem Relation Age of Onset    Cancer Father     Diabetes Father     Heart failure Father     Stroke Father     Heart failure Mother     Kidney disease Neg Hx        Social History:  Social History     Tobacco Use    Smoking status: Former Smoker     Packs/day: 1.00     Years: 40.00     Pack years: 40.00     Last attempt to quit: 2013     Years since quittin.9    Smokeless tobacco: Never Used   Substance and Sexual Activity    Alcohol use: Yes     Alcohol/week: 6.0 standard drinks     Types: 6 Cans of beer per week     Comment: seldom    Drug use: No    Sexual activity: Never        Review of Systems     Review of Systems   Constitutional: Negative for diaphoresis and fever.   HENT: Negative for sore throat.    Respiratory: Negative for shortness of breath.    Cardiovascular: Positive for chest pain and leg swelling (L calf).   Gastrointestinal: Negative for abdominal pain, nausea and vomiting.   Genitourinary: Negative for dysuria.   Musculoskeletal: Positive for myalgias (LUE). Negative for back pain.   Skin: Negative for rash.   Neurological: Negative for weakness and headaches.   Hematological: Does not bruise/bleed easily.   All other systems reviewed and are negative.       Physical Exam     Initial Vitals [19 1701]   BP Pulse Resp Temp SpO2   134/72 89 (!) 26 97.4 °F (36.3 °C) 95 %      MAP       --          Physical Exam  Nursing Notes and Vital Signs Reviewed.  Constitutional: Patient is in no acute distress. Well-developed and well-nourished.  Head: Atraumatic. Normocephalic.  Eyes: PERRL. EOM intact. Conjunctivae are not pale. No scleral icterus.  ENT: Mucous membranes are moist.  Oropharynx is clear and symmetric.    Neck: Supple. Full ROM. No lymphadenopathy.  Cardiovascular: Regular rate. Regular rhythm. No murmurs, rubs, or gallops. Distal pulses are 2+ and symmetric.  Pulmonary/Chest: Tachypnic. Rapid shallow breaths. Clear to auscultation bilaterally. No wheezing or rales. Mild crackles present to L lung.   Abdominal: Soft and non-distended.  There is no tenderness.  No rebound, guarding, or rigidity. Good bowel sounds.  Genitourinary: No CVA tenderness  Musculoskeletal: Moves all extremities. No obvious deformities. No edema. No calf tenderness.  Skin: Warm and dry.  Neurological:  Alert, awake, and appropriate.  Normal speech.  No acute focal neurological deficits are appreciated.  Psychiatric: Normal affect. Good eye contact. Appropriate in content.     ED Course   Procedures  ED Vital Signs:  Vitals:    12/30/19 1701 12/30/19 1811 12/30/19 1830   BP: 134/72     Pulse: 89  85   Resp: (!) 26  20   Temp: 97.4 °F (36.3 °C)     TempSrc: Oral     SpO2: 95%  99%   Weight:  98.2 kg (216 lb 7.9 oz)        Abnormal Lab Results:  Labs Reviewed   CBC W/ AUTO DIFFERENTIAL - Abnormal; Notable for the following components:       Result Value    Hemoglobin 13.9 (*)     Mean Corpuscular Hemoglobin Conc 31.4 (*)     All other components within normal limits   COMPREHENSIVE METABOLIC PANEL - Abnormal; Notable for the following components:    CO2 21 (*)     Glucose 143 (*)     Creatinine 1.6 (*)     AST 48 (*)     ALT 60 (*)     eGFR if  51 (*)     eGFR if non  44 (*)     All other components within normal limits   ISTAT PROCEDURE - Abnormal; Notable for the following components:    POC PO2 102 (*)     All other components within normal limits   CULTURE, BLOOD   CULTURE, BLOOD   LACTIC ACID, PLASMA   TROPONIN I   PROCALCITONIN   LACTIC ACID, PLASMA   URINALYSIS, REFLEX TO URINE CULTURE   B-TYPE NATRIURETIC PEPTIDE        All Lab Results:  Results for orders placed or  performed during the hospital encounter of 12/30/19   CBC auto differential   Result Value Ref Range    WBC 5.53 3.90 - 12.70 K/uL    RBC 4.92 4.60 - 6.20 M/uL    Hemoglobin 13.9 (L) 14.0 - 18.0 g/dL    Hematocrit 44.2 40.0 - 54.0 %    Mean Corpuscular Volume 90 82 - 98 fL    Mean Corpuscular Hemoglobin 28.3 27.0 - 31.0 pg    Mean Corpuscular Hemoglobin Conc 31.4 (L) 32.0 - 36.0 g/dL    RDW 13.8 11.5 - 14.5 %    Platelets 193 150 - 350 K/uL    MPV 9.9 9.2 - 12.9 fL    Immature Granulocytes 0.2 0.0 - 0.5 %    Gran # (ANC) 3.8 1.8 - 7.7 K/uL    Immature Grans (Abs) 0.01 0.00 - 0.04 K/uL    Lymph # 1.3 1.0 - 4.8 K/uL    Mono # 0.4 0.3 - 1.0 K/uL    Eos # 0.0 0.0 - 0.5 K/uL    Baso # 0.01 0.00 - 0.20 K/uL    nRBC 0 0 /100 WBC    Gran% 68.2 38.0 - 73.0 %    Lymph% 23.3 18.0 - 48.0 %    Mono% 7.6 4.0 - 15.0 %    Eosinophil% 0.5 0.0 - 8.0 %    Basophil% 0.2 0.0 - 1.9 %    Differential Method Automated    Comprehensive metabolic panel   Result Value Ref Range    Sodium 138 136 - 145 mmol/L    Potassium 4.4 3.5 - 5.1 mmol/L    Chloride 102 95 - 110 mmol/L    CO2 21 (L) 23 - 29 mmol/L    Glucose 143 (H) 70 - 110 mg/dL    BUN, Bld 19 8 - 23 mg/dL    Creatinine 1.6 (H) 0.5 - 1.4 mg/dL    Calcium 9.3 8.7 - 10.5 mg/dL    Total Protein 7.6 6.0 - 8.4 g/dL    Albumin 3.8 3.5 - 5.2 g/dL    Total Bilirubin 0.5 0.1 - 1.0 mg/dL    Alkaline Phosphatase 72 55 - 135 U/L    AST 48 (H) 10 - 40 U/L    ALT 60 (H) 10 - 44 U/L    Anion Gap 15 8 - 16 mmol/L    eGFR if African American 51 (A) >60 mL/min/1.73 m^2    eGFR if non African American 44 (A) >60 mL/min/1.73 m^2   Lactic acid, plasma #1   Result Value Ref Range    Lactate (Lactic Acid) 2.2 0.5 - 2.2 mmol/L   Troponin I   Result Value Ref Range    Troponin I <0.006 0.000 - 0.026 ng/mL   Procalcitonin   Result Value Ref Range    Procalcitonin 0.03 <0.25 ng/mL   ISTAT PROCEDURE   Result Value Ref Range    POC PH 7.417 7.35 - 7.45    POC PCO2 37.7 35 - 45 mmHg    POC PO2 102 (H) 80 - 100 mmHg     POC HCO3 24.3 24 - 28 mmol/L    POC BE 0 -2 to 2 mmol/L    POC SATURATED O2 98 95 - 100 %    Sample ARTERIAL     Site LR     Allens Test Pass     DelSys Nasal Can     Mode SPONT     Flow 2     FiO2 28      *Note: Due to a large number of results and/or encounters for the requested time period, some results have not been displayed. A complete set of results can be found in Results Review.         Imaging Results:  Imaging Results          X-Ray Chest AP Portable (Final result)  Result time 12/30/19 17:28:20    Final result by Ronny Hoang MD (12/30/19 17:28:20)                 Impression:      Increasing infiltrates or atelectatic change in the lung bases compared to prior day's exam.      Electronically signed by: Ronny Hoang  Date:    12/30/2019  Time:    17:28             Narrative:    EXAMINATION:  XR CHEST AP PORTABLE    CLINICAL HISTORY:  Sepsis;    COMPARISON:  12/29/2019    FINDINGS:  The heart is normal in size.  The aorta is atherosclerotic.  Increasing linear opacities in both lung bases.                                 The EKG was ordered, reviewed, and independently interpreted by the ED provider.  Interpretation time: 1705  Rate: 87 BPM  Rhythm: Sinus rhythm with 1st degree A-V block  Interpretation: Nonspecific ST abnormality. No STEMI.       The Emergency Provider reviewed the vital signs and test results, which are outlined above.     ED Discussion     6:49 PM: Discussed case with Albino Paredes NP ([facility]). Dr. Larry agrees with current care and management of pt and accepts admission.   Admitting Service: Hospital Medicine  Admitting Physician: Dr. Larry  Admit to: [facility] inpatient    6:50 PM: Re-evaluated pt. I have discussed test results, shared treatment plan, and the need for admission with patient and family at bedside. Pt and family express understanding at this time and agree with all information. All questions answered. Pt and family have no further questions or concerns  at this time. Pt is ready for admit.      6:56 PM  The patient is stable and not toxic.  Patient is tachypneic in has worsening chest x-ray and shortness of breath with bilateral pneumonia.  The patient does have a kidney transplant but does not desire to be transferred in [facility].  I do this outpatient treatment failure, will admit for IV antibiotics and further evaluation.  Of note, the patient does not tolerate albuterol well and becomes very anxious and intolerant.    ED Medication(s):  Medications   albuterol-ipratropium 2.5 mg-0.5 mg/3 mL nebulizer solution 3 mL (3 mLs Nebulization Given 12/30/19 1830)   piperacillin-tazobactam 4.5 g in dextrose 5 % 100 mL IVPB (ready to mix system) (has no administration in time range)   vancomycin - pharmacy to dose (has no administration in time range)   vancomycin 2 g in dextrose 5 % 500 mL IVPB (has no administration in time range)   lorazepam (ATIVAN) injection 1 mg (has no administration in time range)       New Prescriptions    No medications on file                 Medical Decision Making:   Clinical Tests:   Lab Tests: Ordered and Reviewed  Radiological Study: Ordered and Reviewed  Medical Tests: Ordered and Reviewed             Scribe Attestation:   Scribe #1: I performed the above scribed service and the documentation accurately describes the services I performed. I attest to the accuracy of the note.     Attending:   Physician Attestation Statement for Scribe #1: I, Chandu Harris Jr., MD, personally performed the services described in this documentation, as scribed by Wade Wood, in my presence, and it is both accurate and complete.           Clinical Impression       ICD-10-CM ICD-9-CM   1. Pneumonia of both lungs due to infectious organism, unspecified part of lung J18.9 483.8   2. SOB (shortness of breath) R06.02 786.05   3. Respiratory distress R06.03 786.09   4. Failure of outpatient treatment Z78.9 V49.89       Disposition:   Disposition:  Admitted  Condition: Fair         Chandu Harris Jr., MD  12/30/19 0932

## 2019-12-31 LAB
ANION GAP SERPL CALC-SCNC: 13 MMOL/L (ref 8–16)
BASOPHILS # BLD AUTO: 0.01 K/UL (ref 0–0.2)
BASOPHILS NFR BLD: 0.3 % (ref 0–1.9)
BILIRUB UR QL STRIP: NEGATIVE
BUN SERPL-MCNC: 14 MG/DL (ref 8–23)
CALCIUM SERPL-MCNC: 8.8 MG/DL (ref 8.7–10.5)
CHLORIDE SERPL-SCNC: 104 MMOL/L (ref 95–110)
CLARITY UR: CLEAR
CO2 SERPL-SCNC: 22 MMOL/L (ref 23–29)
COLOR UR: YELLOW
CREAT SERPL-MCNC: 1.4 MG/DL (ref 0.5–1.4)
DIFFERENTIAL METHOD: ABNORMAL
EOSINOPHIL # BLD AUTO: 0.1 K/UL (ref 0–0.5)
EOSINOPHIL NFR BLD: 1.4 % (ref 0–8)
ERYTHROCYTE [DISTWIDTH] IN BLOOD BY AUTOMATED COUNT: 13.7 % (ref 11.5–14.5)
EST. GFR  (AFRICAN AMERICAN): >60 ML/MIN/1.73 M^2
EST. GFR  (NON AFRICAN AMERICAN): 52 ML/MIN/1.73 M^2
ESTIMATED AVG GLUCOSE: 157 MG/DL (ref 68–131)
GLUCOSE SERPL-MCNC: 102 MG/DL (ref 70–110)
GLUCOSE UR QL STRIP: NEGATIVE
HBA1C MFR BLD HPLC: 7.1 % (ref 4–5.6)
HCT VFR BLD AUTO: 40 % (ref 40–54)
HGB BLD-MCNC: 12.3 G/DL (ref 14–18)
HGB UR QL STRIP: NEGATIVE
IMM GRANULOCYTES # BLD AUTO: 0.01 K/UL (ref 0–0.04)
IMM GRANULOCYTES NFR BLD AUTO: 0.3 % (ref 0–0.5)
KETONES UR QL STRIP: NEGATIVE
LEUKOCYTE ESTERASE UR QL STRIP: NEGATIVE
LYMPHOCYTES # BLD AUTO: 1 K/UL (ref 1–4.8)
LYMPHOCYTES NFR BLD: 28.5 % (ref 18–48)
MAGNESIUM SERPL-MCNC: 1.8 MG/DL (ref 1.6–2.6)
MCH RBC QN AUTO: 27.6 PG (ref 27–31)
MCHC RBC AUTO-ENTMCNC: 30.8 G/DL (ref 32–36)
MCV RBC AUTO: 90 FL (ref 82–98)
MONOCYTES # BLD AUTO: 0.4 K/UL (ref 0.3–1)
MONOCYTES NFR BLD: 11.8 % (ref 4–15)
NEUTROPHILS # BLD AUTO: 2.1 K/UL (ref 1.8–7.7)
NEUTROPHILS NFR BLD: 57.7 % (ref 38–73)
NITRITE UR QL STRIP: NEGATIVE
NRBC BLD-RTO: 0 /100 WBC
PH UR STRIP: 6 [PH] (ref 5–8)
PHOSPHATE SERPL-MCNC: 3.6 MG/DL (ref 2.7–4.5)
PLATELET # BLD AUTO: 170 K/UL (ref 150–350)
PMV BLD AUTO: 10 FL (ref 9.2–12.9)
POCT GLUCOSE: 156 MG/DL (ref 70–110)
POCT GLUCOSE: 212 MG/DL (ref 70–110)
POCT GLUCOSE: 94 MG/DL (ref 70–110)
POCT GLUCOSE: 97 MG/DL (ref 70–110)
POTASSIUM SERPL-SCNC: 3.8 MMOL/L (ref 3.5–5.1)
PROT UR QL STRIP: ABNORMAL
RBC # BLD AUTO: 4.45 M/UL (ref 4.6–6.2)
SODIUM SERPL-SCNC: 139 MMOL/L (ref 136–145)
SP GR UR STRIP: 1.01 (ref 1–1.03)
URN SPEC COLLECT METH UR: ABNORMAL
UROBILINOGEN UR STRIP-ACNC: NEGATIVE EU/DL
VANCOMYCIN SERPL-MCNC: 12.3 UG/ML
WBC # BLD AUTO: 3.55 K/UL (ref 3.9–12.7)

## 2019-12-31 PROCEDURE — 27000221 HC OXYGEN, UP TO 24 HOURS: Mod: HCNC

## 2019-12-31 PROCEDURE — 25000242 PHARM REV CODE 250 ALT 637 W/ HCPCS: Mod: HCNC | Performed by: NURSE PRACTITIONER

## 2019-12-31 PROCEDURE — 21400001 HC TELEMETRY ROOM: Mod: HCNC

## 2019-12-31 PROCEDURE — 96372 THER/PROPH/DIAG INJ SC/IM: CPT | Mod: HCNC

## 2019-12-31 PROCEDURE — 99900035 HC TECH TIME PER 15 MIN (STAT): Mod: HCNC

## 2019-12-31 PROCEDURE — 84100 ASSAY OF PHOSPHORUS: CPT | Mod: HCNC

## 2019-12-31 PROCEDURE — 80202 ASSAY OF VANCOMYCIN: CPT | Mod: HCNC

## 2019-12-31 PROCEDURE — 85025 COMPLETE CBC W/AUTO DIFF WBC: CPT | Mod: HCNC

## 2019-12-31 PROCEDURE — 83735 ASSAY OF MAGNESIUM: CPT | Mod: HCNC

## 2019-12-31 PROCEDURE — 36415 COLL VENOUS BLD VENIPUNCTURE: CPT | Mod: HCNC

## 2019-12-31 PROCEDURE — 81003 URINALYSIS AUTO W/O SCOPE: CPT | Mod: HCNC

## 2019-12-31 PROCEDURE — 63600175 PHARM REV CODE 636 W HCPCS: Mod: HCNC | Performed by: NURSE PRACTITIONER

## 2019-12-31 PROCEDURE — 94640 AIRWAY INHALATION TREATMENT: CPT | Mod: HCNC

## 2019-12-31 PROCEDURE — 94761 N-INVAS EAR/PLS OXIMETRY MLT: CPT | Mod: HCNC

## 2019-12-31 PROCEDURE — 25000003 PHARM REV CODE 250: Mod: HCNC | Performed by: NURSE PRACTITIONER

## 2019-12-31 PROCEDURE — 63600175 PHARM REV CODE 636 W HCPCS: Mod: HCNC | Performed by: INTERNAL MEDICINE

## 2019-12-31 PROCEDURE — 80048 BASIC METABOLIC PNL TOTAL CA: CPT | Mod: HCNC

## 2019-12-31 PROCEDURE — 83036 HEMOGLOBIN GLYCOSYLATED A1C: CPT | Mod: HCNC

## 2019-12-31 RX ORDER — VANCOMYCIN HCL IN 5 % DEXTROSE 1.5G/250ML
1500 PLASTIC BAG, INJECTION (ML) INTRAVENOUS ONCE
Status: COMPLETED | OUTPATIENT
Start: 2019-12-31 | End: 2019-12-31

## 2019-12-31 RX ORDER — MUPIROCIN 20 MG/G
OINTMENT TOPICAL DAILY
Status: DISCONTINUED | OUTPATIENT
Start: 2020-01-01 | End: 2020-01-02 | Stop reason: HOSPADM

## 2019-12-31 RX ADMIN — AMLODIPINE BESYLATE 10 MG: 10 TABLET ORAL at 08:12

## 2019-12-31 RX ADMIN — BUDESONIDE 0.5 MG: 0.5 SUSPENSION RESPIRATORY (INHALATION) at 07:12

## 2019-12-31 RX ADMIN — HYDROCODONE BITARTRATE AND ACETAMINOPHEN 1 TABLET: 10; 325 TABLET ORAL at 02:12

## 2019-12-31 RX ADMIN — PIPERACILLIN AND TAZOBACTAM 4.5 G: 4; .5 INJECTION, POWDER, FOR SOLUTION INTRAVENOUS at 11:12

## 2019-12-31 RX ADMIN — GABAPENTIN 300 MG: 300 CAPSULE ORAL at 02:12

## 2019-12-31 RX ADMIN — LEVALBUTEROL 0.63 MG: 0.63 SOLUTION RESPIRATORY (INHALATION) at 07:12

## 2019-12-31 RX ADMIN — ATORVASTATIN CALCIUM 80 MG: 40 TABLET, FILM COATED ORAL at 08:12

## 2019-12-31 RX ADMIN — ISOSORBIDE MONONITRATE 60 MG: 30 TABLET, EXTENDED RELEASE ORAL at 12:12

## 2019-12-31 RX ADMIN — HYDROCODONE BITARTRATE AND ACETAMINOPHEN 1 TABLET: 10; 325 TABLET ORAL at 08:12

## 2019-12-31 RX ADMIN — PIPERACILLIN AND TAZOBACTAM 4.5 G: 4; .5 INJECTION, POWDER, FOR SOLUTION INTRAVENOUS at 03:12

## 2019-12-31 RX ADMIN — LEVALBUTEROL 0.63 MG: 0.63 SOLUTION RESPIRATORY (INHALATION) at 03:12

## 2019-12-31 RX ADMIN — HEPARIN SODIUM 5000 UNITS: 5000 INJECTION INTRAVENOUS; SUBCUTANEOUS at 09:12

## 2019-12-31 RX ADMIN — HEPARIN SODIUM 5000 UNITS: 5000 INJECTION INTRAVENOUS; SUBCUTANEOUS at 05:12

## 2019-12-31 RX ADMIN — ARFORMOTEROL TARTRATE 15 MCG: 15 SOLUTION RESPIRATORY (INHALATION) at 08:12

## 2019-12-31 RX ADMIN — GABAPENTIN 300 MG: 300 CAPSULE ORAL at 08:12

## 2019-12-31 RX ADMIN — VANCOMYCIN HYDROCHLORIDE 1500 MG: 100 INJECTION, POWDER, LYOPHILIZED, FOR SOLUTION INTRAVENOUS at 09:12

## 2019-12-31 RX ADMIN — METOPROLOL TARTRATE 25 MG: 25 TABLET ORAL at 08:12

## 2019-12-31 RX ADMIN — INSULIN ASPART 1 UNITS: 100 INJECTION, SOLUTION INTRAVENOUS; SUBCUTANEOUS at 09:12

## 2019-12-31 RX ADMIN — PIPERACILLIN AND TAZOBACTAM 4.5 G: 4; .5 INJECTION, POWDER, FOR SOLUTION INTRAVENOUS at 07:12

## 2019-12-31 RX ADMIN — BUDESONIDE 0.5 MG: 0.5 SUSPENSION RESPIRATORY (INHALATION) at 08:12

## 2019-12-31 RX ADMIN — LEVALBUTEROL 0.63 MG: 0.63 SOLUTION RESPIRATORY (INHALATION) at 11:12

## 2019-12-31 RX ADMIN — CLOPIDOGREL BISULFATE 75 MG: 75 TABLET ORAL at 08:12

## 2019-12-31 RX ADMIN — HEPARIN SODIUM 5000 UNITS: 5000 INJECTION INTRAVENOUS; SUBCUTANEOUS at 02:12

## 2019-12-31 RX ADMIN — ASPIRIN 81 MG: 81 TABLET, COATED ORAL at 08:12

## 2019-12-31 RX ADMIN — ARFORMOTEROL TARTRATE 15 MCG: 15 SOLUTION RESPIRATORY (INHALATION) at 07:12

## 2019-12-31 NOTE — PLAN OF CARE
Patient received laying in bed, x4, Vitals remains stable parameters, tolerates medication well. nno given at this time. Will cont. To monitor progress.

## 2019-12-31 NOTE — PLAN OF CARE
Accepted by [facility]     12/31/19 1141   Post-Acute Status   Post-Acute Authorization Home Health/Hospice   Post-Acute Placement Status Set-up Complete

## 2019-12-31 NOTE — ASSESSMENT & PLAN NOTE
--IV abx  --xopenex, brovana, budesonide via nebulizer  --incentive spirometry  --supplemental oxygen   --sputum cx pending  --BC pending  12/31/19  -Blood cx remain negative   -Continue current POC

## 2019-12-31 NOTE — PLAN OF CARE
Chart reviewed, pt resting in bed with call light and personal belongings within reach. Blood sugar monitoring as ordered. Heart monitor 8636, vitals stable, pt on 2L NC. Unable to collect ua or respiratory culture during shift, will continue to try. Iv antibiotics given as ordered, vanc trough due 12/31 @ 1900. Heparin given subq. Pt has radiation burn to left check under eye covered with gauze. Pt absent of injury throughout shift, will continue to monitor.

## 2019-12-31 NOTE — PROGRESS NOTES
Pharmacokinetic Initial Assessment: IV Vancomycin    Assessment/Plan:    Initiate intravenous vancomycin with loading dose of 2000 mg once with subsequent doses when random concentrations are less than 20 mcg/mL  Desired empiric serum trough concentration is 15 to 20 mcg/mL  Draw vancomycin random level on 12/31 at 0430  Pharmacy will continue to follow and monitor vancomycin.      Please contact pharmacy at extension [phone] with any questions regarding this assessment.     Thank you for the consult,   Amy oRme       Patient brief summary:  Lavelle Ladd is a 64 y.o. male initiated on antimicrobial therapy with IV Vancomycin for treatment of suspected lower respiratory infection    Drug Allergies:   Review of patient's allergies indicates:  No Known Allergies    Actual Body Weight:   98.2kg    Renal Function:   Estimated Creatinine Clearance: 54.3 mL/min (A) (based on SCr of 1.6 mg/dL (H)).,     Dialysis Method (if applicable):  N/A    CBC (last 72 hours):  Recent Labs   Lab Result Units 12/29/19  1748 12/30/19  1741   WBC K/uL 5.39 5.53   Hemoglobin g/dL 12.9* 13.9*   Hematocrit % 41.7 44.2   Platelets K/uL 172 193   Gran% % 63.0 68.2   Lymph% % 28.9 23.3   Mono% % 6.9 7.6   Eosinophil% % 0.4 0.5   Basophil% % 0.4 0.2   Differential Method  Automated Automated       Metabolic Panel (last 72 hours):  Recent Labs   Lab Result Units 12/29/19  1748 12/29/19  1853 12/30/19  1741   Sodium mmol/L 138  --  138   Potassium mmol/L 5.0  --  4.4   Chloride mmol/L 106  --  102   CO2 mmol/L 17*  --  21*   Glucose mg/dL 189*  --  143*   Glucose, UA   --  Negative  --    BUN, Bld mg/dL 20  --  19   Creatinine mg/dL 1.7*  --  1.6*   Albumin g/dL 3.5  --  3.8   Total Bilirubin mg/dL 0.4  --  0.5   Alkaline Phosphatase U/L 74  --  72   AST U/L 79*  --  48*   ALT U/L 68*  --  60*       Drug levels (last 3 results):  No results for input(s): VANCOMYCINRA, VANCOMYCINPE, VANCOMYCINTR in the last 72 hours.    Microbiologic  Results:  Microbiology Results (last 7 days)       Procedure Component Value Units Date/Time    Blood culture x two cultures. Draw prior to antibiotics. [[id number]] Collected:  12/30/19 1740    Order Status:  Sent Specimen:  Blood from Radial Arterial Stick, Right Updated:  12/30/19 1755    Blood culture x two cultures. Draw prior to antibiotics. [[id number]] Collected:  12/30/19 1741    Order Status:  Sent Specimen:  Blood from Radial Arterial Stick, Left Updated:  12/30/19 1753

## 2019-12-31 NOTE — PROGRESS NOTES
[facility] Hospital Medicine  Progress Note    Patient Name: Lavelle Ladd  MRN: [id number]  Patient Class: IP- Inpatient   Admission Date: 12/30/2019  Length of Stay: 1 days  Attending Physician: Juliette Larry MD  Primary Care Provider: Rishabh Esteban MD        Subjective:     Principal Problem:Pneumonia of both lungs due to infectious organism        HPI:  64 y.o. male patient with a PMHx of PVD, PAD, HTN, hyperlipidemia, GERD, diastolic heart failure, CRI, CAD, COPD, CHF, and arthritis who presents to the ED with c/o SOB which onset gradually over the past several weeks. Associated symptoms include CP, edema, cough, and general malaise. Symptoms are constant and moderate in severity. No mitigating or exacerbating factors reported. Patient denies any recent travel, long car trips, fever, N/V/D, congestion, rhinorrhea, chills, sore throat, HA, dizziness, numbness, weakness, back pain, dysuria, hematuria, and all other sxs at this time. Prior Tx includes humidifiers and Albuterol inhalers at home with no relief.   He is a full code and his SDM is his sister,  Kecia.  He will be admitted to med/surg for bilat pneumonia under the care of [facility].     Overview/Hospital Course:  64 year old male admitted for bilateral PNA. Patient being treated with IV zosyn, duoneb tx, and supplemental oxygen. Blood culture remain negative.     Interval History: Will continue current POC.     Review of Systems   Constitutional: Negative for activity change, appetite change, chills, fatigue and fever.   HENT: Negative for dental problem, ear pain, hearing loss, mouth sores, nosebleeds, sinus pressure, sore throat, tinnitus and trouble swallowing.    Eyes: Negative for pain, discharge and visual disturbance.   Respiratory: Positive for cough and shortness of breath. Negative for choking, chest tightness and wheezing.    Cardiovascular: Negative for chest pain, palpitations and leg swelling.    Gastrointestinal: Negative for abdominal distention, abdominal pain, anal bleeding, blood in stool, constipation, diarrhea, nausea and vomiting.   Endocrine: Negative for cold intolerance, heat intolerance, polydipsia, polyphagia and polyuria.   Genitourinary: Negative for decreased urine volume, difficulty urinating, flank pain, frequency, hematuria and urgency.   Musculoskeletal: Negative for arthralgias, back pain, gait problem, myalgias, neck pain and neck stiffness.   Skin: Negative for color change, rash and wound.   Allergic/Immunologic: Positive for immunocompromised state.   Neurological: Positive for weakness. Negative for dizziness, tremors, seizures, syncope, speech difficulty, light-headedness and headaches.   Hematological: Negative.    Psychiatric/Behavioral: Negative for agitation, behavioral problems, confusion and sleep disturbance. The patient is not nervous/anxious.    All other systems reviewed and are negative.    Objective:     Vital Signs (Most Recent):  Temp: 98 °F (36.7 °C) (12/31/19 0807)  Pulse: 80 (12/31/19 1502)  Resp: 18 (12/31/19 1502)  BP: (!) 164/84 (12/31/19 0807)  SpO2: 96 % (12/31/19 1502) Vital Signs (24h Range):  Temp:  [98 °F (36.7 °C)-98.7 °F (37.1 °C)] 98 °F (36.7 °C)  Pulse:  [80-88] 80  Resp:  [16-33] 18  SpO2:  [94 %-100 %] 96 %  BP: (113-167)/() 164/84     Weight: 98.2 kg (216 lb 7.9 oz)  Body mass index is 30.19 kg/m².    Intake/Output Summary (Last 24 hours) at 12/31/2019 1712  Last data filed at 12/31/2019 0303  Gross per 24 hour   Intake 220 ml   Output 675 ml   Net -455 ml      Physical Exam   Constitutional: He is oriented to person, place, and time. He appears well-developed and well-nourished. No distress.   HENT:   Head: Normocephalic and atraumatic.   Nose: Nose normal.   Mouth/Throat: Oropharynx is clear and moist.   Eyes: Pupils are equal, round, and reactive to light. Conjunctivae and EOM are normal. Right eye exhibits no discharge. Left eye exhibits  no discharge.   Neck: Normal range of motion. Neck supple. No JVD present.   Cardiovascular: Normal rate, regular rhythm, normal heart sounds and intact distal pulses. Exam reveals no gallop and no friction rub.   No murmur heard.  Pulmonary/Chest: Effort normal. No respiratory distress. He has no wheezes. He has rales in the left lower field. He exhibits no tenderness.   Abdominal: Soft. Bowel sounds are normal. He exhibits no distension and no mass. There is no tenderness.   Genitourinary:   Genitourinary Comments: deferred   Musculoskeletal: Normal range of motion. He exhibits no edema, tenderness or deformity.   Right AKA    Neurological: He is alert and oriented to person, place, and time. He exhibits normal muscle tone. Coordination normal.   Skin: Skin is warm and dry. Capillary refill takes less than 2 seconds. No rash noted. He is not diaphoretic. No erythema.   Psychiatric: He has a normal mood and affect. His behavior is normal.   Nursing note and vitals reviewed.      Significant Labs:   Blood Culture:   Recent Labs   Lab 12/30/19 1740 12/30/19 1741   LABBLOO No Growth to date No Growth to date     BMP:   Recent Labs   Lab 12/31/19 0456         K 3.8      CO2 22*   BUN 14   CREATININE 1.4   CALCIUM 8.8   MG 1.8     CBC:   Recent Labs   Lab 12/29/19 1748 12/30/19  1741 12/31/19  0456   WBC 5.39 5.53 3.55*   HGB 12.9* 13.9* 12.3*   HCT 41.7 44.2 40.0    193 170     CMP:   Recent Labs   Lab 12/29/19 1748 12/30/19  1741 12/31/19  0456    138 139   K 5.0 4.4 3.8    102 104   CO2 17* 21* 22*   * 143* 102   BUN 20 19 14   CREATININE 1.7* 1.6* 1.4   CALCIUM 9.1 9.3 8.8   PROT 6.9 7.6  --    ALBUMIN 3.5 3.8  --    BILITOT 0.4 0.5  --    ALKPHOS 74 72  --    AST 79* 48*  --    ALT 68* 60*  --    ANIONGAP 15 15 13   EGFRNONAA 41* 44* 52*     All pertinent labs within the past 24 hours have been reviewed.    Significant Imaging:   Imaging Results          X-Ray Chest  AP Portable (Final result)  Result time 12/30/19 17:28:20    Final result by Ronny Hoang MD (12/30/19 17:28:20)                 Impression:      Increasing infiltrates or atelectatic change in the lung bases compared to prior day's exam.      Electronically signed by: Ronny Hoang  Date:    12/30/2019  Time:    17:28             Narrative:    EXAMINATION:  XR CHEST AP PORTABLE    CLINICAL HISTORY:  Sepsis;    COMPARISON:  12/29/2019    FINDINGS:  The heart is normal in size.  The aorta is atherosclerotic.  Increasing linear opacities in both lung bases.                                Assessment/Plan:      * Pneumonia of both lungs due to infectious organism  --IV abx  --xopenex, brovana, budesonide via nebulizer  --incentive spirometry  --supplemental oxygen   --sputum cx pending  --BC pending  12/31/19  -Blood cx remain negative   -Continue current POC     Squamous cell carcinoma, scalp/neck  --dressing c/d/i  --will f/u OP      Hx of right BKA  --well healed        Type 2 diabetes mellitus with retinopathy, with long-term current use of insulin  --accuchecks and ssi  --diabetic diet  --HA1C 7.1    Essential hypertension  --continue amlodipine, isosorbide, metoprolol  --cardiac diet        VTE Risk Mitigation (From admission, onward)         Ordered     heparin (porcine) injection 5,000 Units  Every 8 hours      12/30/19 2127     IP VTE HIGH RISK PATIENT  Once      12/30/19 2127                      Carla Irby NP  Department of Hospital Medicine   [facility]

## 2019-12-31 NOTE — ASSESSMENT & PLAN NOTE
--IV abx  --xopenex, brovana, budesonide via nebulizer  --incentive spirometry  --supplemental oxygen   --sputum cx pending  --BC pending

## 2019-12-31 NOTE — PLAN OF CARE
Pt was on 2L NC when RT walked into room. Treatment given with no problems. Pt was weaned to RA. Saturation maintained.

## 2019-12-31 NOTE — HOSPITAL COURSE
64 year old male admitted for bilateral PNA. Patient being treated with IV zosyn, duoneb tx, and supplemental oxygen. Blood culture remain negative. 1/1/20- WBCs remain normal, afebrile. Creatinine 1.9 up from 1.4. IV zosyn switched to IV cefepime. Patient c/o left arm pain US negative for DVT. 1/2/20- Symptoms improved. Renal function trending down. Blood cx remain negative. Case discussed with Dr. Proctor. Patient ready for discharge. Social work consult to arrange HH. Patient to follow-up with PCP in 3 days for hospital follow-up of PNA. Patient also to follow-up with cardiology, oncology, and vascular surgery outpatient. Home meds reconciled. New prescription given for Augmentin. Patient seen and examined on the date of discharge and found suitable for discharge.

## 2019-12-31 NOTE — SUBJECTIVE & OBJECTIVE
Past Medical History:   Diagnosis Date    DINORAH (acute kidney injury) 2016    Arthritis     CAD in native artery 2019    CHF (congestive heart failure)     Chronic obstructive pulmonary disease 2016    Coronary artery disease involving native coronary artery of native heart without angina pectoris 2016    CRI (chronic renal insufficiency) 2019     donor kidney transplant for DM 16     Induction with Thymo x3 and IV solumedrol to total 875mg  Kidney Biopsy  2016: 16 glomeruli, ACR type 1 AVR type 2, significant microcirculatory changes, c4d negative, No DSA, 5 to10% fibrosis. Treated with thymo x8 2016- no rejection      Diastolic heart failure     Encounter for blood transfusion     ESRD on RRT since 10/2013 10/29/2013    Biopsy proven diabetic nephropathy and lymphoplasmacytic interstitial infiltrate not c/w with AIN (ddx sjogrens or assoc with tamm-horsefall protein extravasation)     GERD (gastroesophageal reflux disease)     History of hepatitis C, s/p successful Rx w/ SVR12 - 2017    Completed 12 weeks harvoni w/ SVR    Hyperlipidemia     Hypertension     PAD (peripheral artery disease) 2019    PIC line (peripherally inserted central catheter) flush     Prophylactic immunotherapy     Proteinuria     PVD (peripheral vascular disease) 2017    RLE BKA CT 16 Extensive atherosclerotic disease of the aorta and mesenteric arteries.     Renal hypertension     Type 2 diabetes mellitus with diabetic neuropathy, with long-term current use of insulin 2016    Vitamin B12 deficiency        Past Surgical History:   Procedure Laterality Date    AORTOGRAPHY WITH SERIALOGRAPHY N/A 2018    Procedure: LEFT LEG ANGIOGRAM;  Surgeon: Donal Mcdonald MD;  Location: [facility] CATH LAB;  Service: Vascular;  Laterality: N/A;    av bovine graft      Left UE    AV FISTULA PLACEMENT      left UE    CARDIAC CATHETERIZATION  2015     CLOSURE OF WOUND Left 9/24/2018    Procedure: CLOSURE, WOUND;  Surgeon: Karla Wheeler DPM;  Location: [facility] OR;  Service: Podiatry;  Laterality: Left;  Secondary Wound closure, extensive    CLOSURE OF WOUND Left 11/5/2018    Procedure: CLOSURE, WOUND;  Surgeon: Karla Wheeler DPM;  Location: [facility] OR;  Service: Podiatry;  Laterality: Left;    DEBRIDEMENT OF MULTIPLE METATARSAL BONES Left 11/5/2018    Procedure: DEBRIDEMENT, METATARSAL BONE, 2 OR MORE BONES;  Surgeon: Karla Wheeler DPM;  Location: [facility] OR;  Service: Podiatry;  Laterality: Left;    EXCISION OF SKIN Left 9/27/2019    Procedure: EXCISION, SKIN;  Surgeon: Lenard Alarcon MD;  Location: [address];  Service: Plastics;  Laterality: Left;  Plastics set, NIMS monitor, [employer]    FOOT AMPUTATION THROUGH METATARSAL Left 9/21/2018    Procedure: AMPUTATION, FOOT, TRANSMETATARSAL;  Surgeon: Karla Wheeler DPM;  Location: [facility] OR;  Service: Podiatry;  Laterality: Left;    FOOT AMPUTATION THROUGH METATARSAL Left 10/31/2018    Procedure: AMPUTATION, FOOT, TRANSMETATARSAL;  Surgeon: Karla Wheeler DPM;  Location: [facility] OR;  Service: Podiatry;  Laterality: Left;    FOOT AMPUTATION THROUGH METATARSAL Left 11/5/2018    Procedure: AMPUTATION, FOOT, TRANSMETATARSAL;  Surgeon: Karla Wheeler DPM;  Location: [facility] OR;  Service: Podiatry;  Laterality: Left;  revisional transmetatarsal amputation, Left foot    IRRIGATION AND DEBRIDEMENT OF LOWER EXTREMITY Left 10/31/2018    Procedure: IRRIGATION AND DEBRIDEMENT, LOWER EXTREMITY;  Surgeon: Karla Wheeler DPM;  Location: [facility] OR;  Service: Podiatry;  Laterality: Left;    KIDNEY TRANSPLANT  05/21/2016    LEFT HEART CATHETERIZATION Left 7/21/2019    Procedure: CATHETERIZATION, HEART, LEFT;  Surgeon: Andrew Valdez MD;  Location: [facility] CATH LAB;  Service: Cardiology;  Laterality: Left;    LEG AMPUTATION THROUGH KNEE  2011    right LE, started as nail puncture leading to diabetic ulcer     SKIN FULL THICKNESS GRAFT Left 10/7/2019    Procedure: APPLICATION, GRAFT, SKIN, FULL-THICKNESS;  Surgeon: Lenard Alarcon MD;  Location: [facility] OR [address] [place] [address];  Service: Plastics;  Laterality: Left;    SURGICAL REMOVAL OF LESION OF FACE Right 10/7/2019    Procedure: EXCISION, LESION, FACE;  Surgeon: Lenard Alarcon MD;  Location: [facility] OR [address] [place] [address];  Service: Plastics;  Laterality: Right;       Review of patient's allergies indicates:  No Known Allergies    Current Facility-Administered Medications on File Prior to Encounter   Medication    [COMPLETED] cefTRIAXone (ROCEPHIN) 1 g in dextrose 5 % 50 mL IVPB     Current Outpatient Medications on File Prior to Encounter   Medication Sig    amLODIPine (NORVASC) 10 MG tablet Take 1 tablet (10 mg total) by mouth once daily.    aspirin (ECOTRIN) 81 MG EC tablet Take 1 tablet (81 mg total) by mouth once daily.    atorvastatin (LIPITOR) 80 MG tablet Take 1 tablet (80 mg total) by mouth once daily.    azithromycin (Z-KOKI) 250 MG tablet Take 1 tablet (250 mg total) by mouth once daily. Take first 2 tablets together, then 1 every day until finished.    azithromycin (Z-KOKI) 250 MG tablet Take 1 tablet (250 mg total) by mouth once daily. Take first 2 tablets together, then 1 every day until finished.    clopidogrel (PLAVIX) 75 mg tablet Take 1 tablet (75 mg total) by mouth once daily.    gabapentin (NEURONTIN) 300 MG capsule Take 1 capsule (300 mg total) by mouth 3 (three) times daily. for 10 days    HYDROcodone-acetaminophen (NORCO)  mg per tablet 1 tablet by Per G Tube route every 6 (six) hours as needed for Pain.    isosorbide mononitrate (IMDUR) 30 MG 24 hr tablet Take 2 tablets (60 mg total) by mouth once daily.    liraglutide 0.6 mg/0.1 mL, 18 mg/3 mL, subq PNIJ (VICTOZA 2-KOKI) 0.6 mg/0.1 mL (18 mg/3 mL) PnIj Inject 1.8 mg into the skin once daily.    metoprolol tartrate (LOPRESSOR) 25 MG tablet Take 1 tablet (25 mg total) by mouth 2 (two) times daily.     "mupirocin calcium 2% (BACTROBAN) 2 % cream Apply topically 3 (three) times daily.    predniSONE (DELTASONE) 5 MG tablet Take 1 tablet (5 mg total) by mouth once daily.    tacrolimus (PROGRAF) 0.5 MG Cap Take 3 capsules (1.5 mg total) by mouth every 12 (twelve) hours.    [DISCONTINUED] sodium bicarbonate 650 MG tablet Take 4 tablets (2,600 mg total) by mouth 2 (two) times daily.    albuterol sulfate 2.5 mg/0.5 mL Nebu Take 2.5 mg by nebulization every 4 (four) hours as needed. Rescue    BD INSULIN SYRINGE ULTRA-FINE 1 mL 31 gauge x 5/16 Syrg USE ONE AS DIRECTED FOUR TIMES DAILY WITH MEALS AND AT BEDTIME    blood sugar diagnostic Strp 1 each by Misc.(Non-Drug; Combo Route) route 3 (three) times daily.    blood-glucose meter kit Use as instructed    diclofenac (FLECTOR) 1.3 % PT12 Apply 1 packet topically once daily.    ergocalciferol (ERGOCALCIFEROL) 50,000 unit Cap Take 1 capsule (50,000 Units total) by mouth every 7 days. Take on Mondays    flash glucose scanning reader (FREESTYLE BRYAN 14 DAY READER) Misc 1 Device by Misc.(Non-Drug; Combo Route) route as needed.    flash glucose sensor (FREESTYLE BRYAN 14 DAY SENSOR) Kit 1 kit by Misc.(Non-Drug; Combo Route) route every 14 (fourteen) days.    mupirocin (BACTROBAN) 2 % ointment Apply topically 3 (three) times daily.    naloxone (NARCAN) 4 mg/actuation Spry 1 spray by Nasal route once.    nitroGLYCERIN (NITROSTAT) 0.4 MG SL tablet Place 1 tablet (0.4 mg total) under the tongue every 5 (five) minutes as needed.    ondansetron (ZOFRAN-ODT) 4 MG TbDL Take 1 tablet (4 mg total) by mouth every 8 (eight) hours as needed.    pen needle, diabetic (SURE-FINE PEN NEEDLES) 31 gauge x 3/16" Ndle 1 each by Misc.(Non-Drug; Combo Route) route 4 (four) times daily with meals and nightly.    pen needle, diabetic 32 gauge x 5/32" Ndle 1 each by Misc.(Non-Drug; Combo Route) route 4 (four) times daily. Urszula Needles    TRESIBA FLEXTOUCH U-100 100 unit/mL (3 mL) InPn " Inject 30 Units into the skin 2 (two) times daily.     Family History     Problem Relation (Age of Onset)    Cancer Father    Diabetes Father    Heart failure Father, Mother    Stroke Father        Tobacco Use    Smoking status: Former Smoker     Packs/day: 1.00     Years: 40.00     Pack years: 40.00     Last attempt to quit: 2013     Years since quittin.9    Smokeless tobacco: Never Used   Substance and Sexual Activity    Alcohol use: Yes     Alcohol/week: 6.0 standard drinks     Types: 6 Cans of beer per week     Comment: seldom    Drug use: No    Sexual activity: Never     Review of Systems   Constitutional: Negative for activity change, appetite change, chills, fatigue and fever.   HENT: Negative for dental problem, ear pain, hearing loss, mouth sores, nosebleeds, sinus pressure, sore throat, tinnitus and trouble swallowing.    Eyes: Negative for pain, discharge and visual disturbance.   Respiratory: Positive for cough and shortness of breath. Negative for choking, chest tightness and wheezing.    Cardiovascular: Positive for chest pain. Negative for palpitations and leg swelling.   Gastrointestinal: Negative for abdominal distention, abdominal pain, anal bleeding, blood in stool, constipation, diarrhea, nausea and vomiting.   Endocrine: Negative for cold intolerance, heat intolerance, polydipsia, polyphagia and polyuria.   Genitourinary: Negative for decreased urine volume, difficulty urinating, flank pain, frequency, hematuria and urgency.   Musculoskeletal: Negative for arthralgias, back pain, gait problem, myalgias, neck pain and neck stiffness.   Skin: Negative for color change, rash and wound.   Allergic/Immunologic: Positive for immunocompromised state.   Neurological: Positive for weakness. Negative for dizziness, tremors, seizures, syncope, speech difficulty, light-headedness and headaches.   Hematological: Negative.    Psychiatric/Behavioral: Negative for agitation, behavioral problems,  confusion and sleep disturbance. The patient is not nervous/anxious.    All other systems reviewed and are negative.    Objective:     Vital Signs (Most Recent):  Temp: 98 °F (36.7 °C) (12/30/19 1944)  Pulse: 86 (12/30/19 1944)  Resp: 18 (12/30/19 1944)  BP: (!) 167/90 (12/30/19 1944)  SpO2: 95 % (12/30/19 1944) Vital Signs (24h Range):  Temp:  [97.4 °F (36.3 °C)-98.2 °F (36.8 °C)] 98 °F (36.7 °C)  Pulse:  [85-90] 86  Resp:  [16-33] 18  SpO2:  [95 %-100 %] 95 %  BP: (134-167)/() 167/90     Weight: 98.2 kg (216 lb 7.9 oz)  Body mass index is 30.19 kg/m².    Physical Exam   Constitutional: He is oriented to person, place, and time. He appears well-developed and well-nourished. No distress.   HENT:   Head: Normocephalic and atraumatic.   Nose: Nose normal.   Mouth/Throat: Oropharynx is clear and moist.   Eyes: Pupils are equal, round, and reactive to light. Conjunctivae and EOM are normal. Right eye exhibits no discharge. Left eye exhibits no discharge.   Neck: Normal range of motion. Neck supple. No JVD present.   Cardiovascular: Normal rate, regular rhythm, normal heart sounds and intact distal pulses. Exam reveals no gallop and no friction rub.   No murmur heard.  Pulmonary/Chest: Effort normal. No respiratory distress. He has no wheezes. He has rales in the left lower field. He exhibits no tenderness.   Abdominal: Soft. Bowel sounds are normal. He exhibits no distension and no mass. There is no tenderness.   Genitourinary:   Genitourinary Comments: deferred   Musculoskeletal: Normal range of motion. He exhibits no edema, tenderness or deformity.   RAKA   Neurological: He is alert and oriented to person, place, and time. He exhibits normal muscle tone. Coordination normal.   Skin: Skin is warm and dry. Capillary refill takes less than 2 seconds. No rash noted. He is not diaphoretic. No erythema.   Psychiatric: He has a normal mood and affect. His behavior is normal.   Nursing note and vitals  reviewed.        CRANIAL NERVES     CN III, IV, VI   Pupils are equal, round, and reactive to light.  Extraocular motions are normal.        Significant Labs:   Recent Lab Results       12/30/19 1959 12/30/19  1842 12/30/19  1741        Procalcitonin     0.03  Comment:  A concentration < 0.25 ng/mL represents a low risk bacterial   infection.  Procalcitonin may not be accurate among patients with localized   infection, recent trauma or major surgery, immunosuppressed state,   invasive fungal infection, renal dysfunction. Decisions regarding   initiation or continuation of antibiotic therapy should not be based   solely on procalcitonin levels.       Albumin     3.8     Alkaline Phosphatase     72     Allens Test   Pass       ALT     60     Anion Gap     15     AST     48     Baso #     0.01     Basophil%     0.2     BILIRUBIN TOTAL     0.5  Comment:  For infants and newborns, interpretation of results should be based  on gestational age, weight and in agreement with clinical  observations.  Premature Infant recommended reference ranges:  Up to 24 hours.............<8.0 mg/dL  Up to 48 hours............<12.0 mg/dL  3-5 days..................<15.0 mg/dL  6-29 days.................<15.0 mg/dL       BNP 52  Comment:  Values of less than 100 pg/ml are consistent with non-CHF populations.         Site   LR       BUN, Bld     19     Calcium     9.3     Chloride     102     CO2     21     Creatinine     1.6     DelSys   Nasal Can       Differential Method     Automated     eGFR if      51     eGFR if non      44  Comment:  Calculation used to obtain the estimated glomerular filtration  rate (eGFR) is the CKD-EPI equation.        Eos #     0.0     Eosinophil%     0.5     FiO2   28       Flow   2       Glucose     143     Gran # (ANC)     3.8     Gran%     68.2     Hematocrit     44.2     Hemoglobin     13.9     Immature Grans (Abs)     0.01  Comment:  Mild elevation in immature  granulocytes is non specific and   can be seen in a variety of conditions including stress response,   acute inflammation, trauma and pregnancy. Correlation with other   laboratory and clinical findings is essential.       Immature Granulocytes     0.2     Lactate, Massimo 1.8  Comment:  Falsely low lactic acid results can be found in samples   containing >=13.0 mg/dL total bilirubin and/or >=3.5 mg/dL   direct bilirubin.     2.2  Comment:  Falsely low lactic acid results can be found in samples   containing >=13.0 mg/dL total bilirubin and/or >=3.5 mg/dL   direct bilirubin.       Lymph #     1.3     Lymph%     23.3     MCH     28.3     MCHC     31.4     MCV     90     Mode   SPONT       Mono #     0.4     Mono%     7.6     MPV     9.9     nRBC     0     Platelets     193     POC BE   0       POC HCO3   24.3       POC PCO2   37.7       POC PH   7.417       POC PO2   102       POC SATURATED O2   98       Potassium     4.4     PROTEIN TOTAL     7.6     RBC     4.92     RDW     13.8     Sample   ARTERIAL       Sodium     138     Troponin I     <0.006  Comment:  The reference interval for Troponin I represents the 99th percentile   cutoff   for our facility and is consistent with 3rd generation assay   performance.       WBC     5.53         All pertinent labs within the past 24 hours have been reviewed.    Significant Imaging: I have reviewed all pertinent imaging results/findings within the past 24 hours.

## 2019-12-31 NOTE — NURSING
Wound care orders faxed to facility by pt. PCP in regards to pt. left cheek dermatitis; MD/NP notified of orders received and placed in pt. Chart. Will cont. To monitor progress.

## 2019-12-31 NOTE — H&P
[facility]  History & Physical    Patient Name: Lavelle Ladd  MRN: [id number]  Admission Date: 2019  Attending Physician: Juliette Larry MD   Primary Care Provider: Rishabh Esteban MD         Patient information was obtained from patient, past medical records and ER records.     Subjective:     Principal Problem:Pneumonia of both lungs due to infectious organism    Chief Complaint:   Chief Complaint   Patient presents with    Shortness of Breath     dx with pneumonia yesterday. pt reports increased difficulty breathing.         HPI: 64 y.o. male patient with a PMHx of PVD, PAD, HTN, hyperlipidemia, GERD, diastolic heart failure, CRI, CAD, COPD, CHF, and arthritis who presents to the ED with c/o SOB which onset gradually over the past several weeks. Associated symptoms include CP, edema, cough, and general malaise. Symptoms are constant and moderate in severity. No mitigating or exacerbating factors reported. Patient denies any recent travel, long car trips, fever, N/V/D, congestion, rhinorrhea, chills, sore throat, HA, dizziness, numbness, weakness, back pain, dysuria, hematuria, and all other sxs at this time. Prior Tx includes humidifiers and Albuterol inhalers at home with no relief.   He is a full code and his SDM is his sister,  Kecia.  He will be admitted to med/surg for bilat pneumonia under the care of hospital Medicine.     Past Medical History:   Diagnosis Date    DINORAH (acute kidney injury) 2016    Arthritis     CAD in native artery 2019    CHF (congestive heart failure)     Chronic obstructive pulmonary disease 2016    Coronary artery disease involving native coronary artery of native heart without angina pectoris 2016    CRI (chronic renal insufficiency) 2019     donor kidney transplant for DM 16     Induction with Thymo x3 and IV solumedrol to total 875mg  Kidney Biopsy  2016: 16 glomeruli, ACR type 1 AVR  type 2, significant microcirculatory changes, c4d negative, No DSA, 5 to10% fibrosis. Treated with thymo x8 6/21/2016- no rejection      Diastolic heart failure     Encounter for blood transfusion     ESRD on RRT since 10/2013 10/29/2013    Biopsy proven diabetic nephropathy and lymphoplasmacytic interstitial infiltrate not c/w with AIN (ddx sjogrens or assoc with tamm-horsefall protein extravasation)     GERD (gastroesophageal reflux disease)     History of hepatitis C, s/p successful Rx w/ SVR12 - 4/2017 4/5/2017    Completed 12 weeks harvoni w/ SVR    Hyperlipidemia     Hypertension     PAD (peripheral artery disease) 7/21/2019    PIC line (peripherally inserted central catheter) flush     Prophylactic immunotherapy     Proteinuria     PVD (peripheral vascular disease) 6/26/2017    RLE BKA CT 12/11/16 Extensive atherosclerotic disease of the aorta and mesenteric arteries.     Renal hypertension     Type 2 diabetes mellitus with diabetic neuropathy, with long-term current use of insulin 12/1/2016    Vitamin B12 deficiency        Past Surgical History:   Procedure Laterality Date    AORTOGRAPHY WITH SERIALOGRAPHY N/A 6/14/2018    Procedure: LEFT LEG ANGIOGRAM;  Surgeon: Donal Mcdonald MD;  Location: [facility] CATH LAB;  Service: Vascular;  Laterality: N/A;    av bovine graft      Left UE    AV FISTULA PLACEMENT      left UE    CARDIAC CATHETERIZATION  02/2015    CLOSURE OF WOUND Left 9/24/2018    Procedure: CLOSURE, WOUND;  Surgeon: Karla Wheeler DPM;  Location: [facility] OR;  Service: Podiatry;  Laterality: Left;  Secondary Wound closure, extensive    CLOSURE OF WOUND Left 11/5/2018    Procedure: CLOSURE, WOUND;  Surgeon: Karla Wheeler DPM;  Location: [facility] OR;  Service: Podiatry;  Laterality: Left;    DEBRIDEMENT OF MULTIPLE METATARSAL BONES Left 11/5/2018    Procedure: DEBRIDEMENT, METATARSAL BONE, 2 OR MORE BONES;  Surgeon: Karla Wheeler DPM;  Location: [facility] OR;  Service: Podiatry;   Laterality: Left;    EXCISION OF SKIN Left 9/27/2019    Procedure: EXCISION, SKIN;  Surgeon: Lenard Alarcon MD;  [address]  Service: Plastics;  Laterality: Left;  Plastics set, NIMS monitor, [employer]    FOOT AMPUTATION THROUGH METATARSAL Left 9/21/2018    Procedure: AMPUTATION, FOOT, TRANSMETATARSAL;  Surgeon: Karla Wheeler DPM;  Location: [facility] OR;  Service: Podiatry;  Laterality: Left;    FOOT AMPUTATION THROUGH METATARSAL Left 10/31/2018    Procedure: AMPUTATION, FOOT, TRANSMETATARSAL;  Surgeon: Karla Wheeler DPM;  Location: [facility] OR;  Service: Podiatry;  Laterality: Left;    FOOT AMPUTATION THROUGH METATARSAL Left 11/5/2018    Procedure: AMPUTATION, FOOT, TRANSMETATARSAL;  Surgeon: Karla Wheeler DPM;  Location: [facility] OR;  Service: Podiatry;  Laterality: Left;  revisional transmetatarsal amputation, Left foot    IRRIGATION AND DEBRIDEMENT OF LOWER EXTREMITY Left 10/31/2018    Procedure: IRRIGATION AND DEBRIDEMENT, LOWER EXTREMITY;  Surgeon: Karla Wheeler DPM;  Location: [facility] OR;  Service: Podiatry;  Laterality: Left;    KIDNEY TRANSPLANT  05/21/2016    LEFT HEART CATHETERIZATION Left 7/21/2019    Procedure: CATHETERIZATION, HEART, LEFT;  Surgeon: Andrew Valdez MD;  Location: [facility] CATH LAB;  Service: Cardiology;  Laterality: Left;    LEG AMPUTATION THROUGH KNEE  2011    right LE, started as nail puncture leading to diabetic ulcer    SKIN FULL THICKNESS GRAFT Left 10/7/2019    Procedure: APPLICATION, GRAFT, SKIN, FULL-THICKNESS;  Surgeon: Lenard Alarcon MD;  Location: [facility] OR 2ND FLR;  Service: Plastics;  Laterality: Left;    SURGICAL REMOVAL OF LESION OF FACE Right 10/7/2019    Procedure: EXCISION, LESION, FACE;  Surgeon: Lenard Alarcon MD;  [facility];  Service: Plastics;  Laterality: Right;       Review of patient's allergies indicates:  No Known Allergies    Current Facility-Administered Medications on File Prior to Encounter   Medication     [COMPLETED] cefTRIAXone (ROCEPHIN) 1 g in dextrose 5 % 50 mL IVPB     Current Outpatient Medications on File Prior to Encounter   Medication Sig    amLODIPine (NORVASC) 10 MG tablet Take 1 tablet (10 mg total) by mouth once daily.    aspirin (ECOTRIN) 81 MG EC tablet Take 1 tablet (81 mg total) by mouth once daily.    atorvastatin (LIPITOR) 80 MG tablet Take 1 tablet (80 mg total) by mouth once daily.    azithromycin (Z-KOKI) 250 MG tablet Take 1 tablet (250 mg total) by mouth once daily. Take first 2 tablets together, then 1 every day until finished.    azithromycin (Z-KOKI) 250 MG tablet Take 1 tablet (250 mg total) by mouth once daily. Take first 2 tablets together, then 1 every day until finished.    clopidogrel (PLAVIX) 75 mg tablet Take 1 tablet (75 mg total) by mouth once daily.    gabapentin (NEURONTIN) 300 MG capsule Take 1 capsule (300 mg total) by mouth 3 (three) times daily. for 10 days    HYDROcodone-acetaminophen (NORCO)  mg per tablet 1 tablet by Per G Tube route every 6 (six) hours as needed for Pain.    isosorbide mononitrate (IMDUR) 30 MG 24 hr tablet Take 2 tablets (60 mg total) by mouth once daily.    liraglutide 0.6 mg/0.1 mL, 18 mg/3 mL, subq PNIJ (VICTOZA 2-KOKI) 0.6 mg/0.1 mL (18 mg/3 mL) PnIj Inject 1.8 mg into the skin once daily.    metoprolol tartrate (LOPRESSOR) 25 MG tablet Take 1 tablet (25 mg total) by mouth 2 (two) times daily.    mupirocin calcium 2% (BACTROBAN) 2 % cream Apply topically 3 (three) times daily.    predniSONE (DELTASONE) 5 MG tablet Take 1 tablet (5 mg total) by mouth once daily.    tacrolimus (PROGRAF) 0.5 MG Cap Take 3 capsules (1.5 mg total) by mouth every 12 (twelve) hours.    [DISCONTINUED] sodium bicarbonate 650 MG tablet Take 4 tablets (2,600 mg total) by mouth 2 (two) times daily.    albuterol sulfate 2.5 mg/0.5 mL Nebu Take 2.5 mg by nebulization every 4 (four) hours as needed. Rescue    BD INSULIN SYRINGE ULTRA-FINE 1 mL 31 gauge x 5/16  "Syrg USE ONE AS DIRECTED FOUR TIMES DAILY WITH MEALS AND AT BEDTIME    blood sugar diagnostic Strp 1 each by Misc.(Non-Drug; Combo Route) route 3 (three) times daily.    blood-glucose meter kit Use as instructed    diclofenac (FLECTOR) 1.3 % PT12 Apply 1 packet topically once daily.    ergocalciferol (ERGOCALCIFEROL) 50,000 unit Cap Take 1 capsule (50,000 Units total) by mouth every 7 days. Take on     flash glucose scanning reader (FREESTYLE BRYAN 14 DAY READER) Misc 1 Device by Misc.(Non-Drug; Combo Route) route as needed.    flash glucose sensor (FREESTYLE BRYAN 14 DAY SENSOR) Kit 1 kit by Misc.(Non-Drug; Combo Route) route every 14 (fourteen) days.    mupirocin (BACTROBAN) 2 % ointment Apply topically 3 (three) times daily.    naloxone (NARCAN) 4 mg/actuation Spry 1 spray by Nasal route once.    nitroGLYCERIN (NITROSTAT) 0.4 MG SL tablet Place 1 tablet (0.4 mg total) under the tongue every 5 (five) minutes as needed.    ondansetron (ZOFRAN-ODT) 4 MG TbDL Take 1 tablet (4 mg total) by mouth every 8 (eight) hours as needed.    pen needle, diabetic (SURE-FINE PEN NEEDLES) 31 gauge x 3/16" Ndle 1 each by Misc.(Non-Drug; Combo Route) route 4 (four) times daily with meals and nightly.    pen needle, diabetic 32 gauge x 5/32" Ndle 1 each by Misc.(Non-Drug; Combo Route) route 4 (four) times daily. Urszula Needles    TRESIBA FLEXTOUCH U-100 100 unit/mL (3 mL) InPn Inject 30 Units into the skin 2 (two) times daily.     Family History     Problem Relation (Age of Onset)    Cancer Father    Diabetes Father    Heart failure Father, Mother    Stroke Father        Tobacco Use    Smoking status: Former Smoker     Packs/day: 1.00     Years: 40.00     Pack years: 40.00     Last attempt to quit: 2013     Years since quittin.9    Smokeless tobacco: Never Used   Substance and Sexual Activity    Alcohol use: Yes     Alcohol/week: 6.0 standard drinks     Types: 6 Cans of beer per week     Comment: seldom "    Drug use: No    Sexual activity: Never     Review of Systems   Constitutional: Negative for activity change, appetite change, chills, fatigue and fever.   HENT: Negative for dental problem, ear pain, hearing loss, mouth sores, nosebleeds, sinus pressure, sore throat, tinnitus and trouble swallowing.    Eyes: Negative for pain, discharge and visual disturbance.   Respiratory: Positive for cough and shortness of breath. Negative for choking, chest tightness and wheezing.    Cardiovascular: Positive for chest pain. Negative for palpitations and leg swelling.   Gastrointestinal: Negative for abdominal distention, abdominal pain, anal bleeding, blood in stool, constipation, diarrhea, nausea and vomiting.   Endocrine: Negative for cold intolerance, heat intolerance, polydipsia, polyphagia and polyuria.   Genitourinary: Negative for decreased urine volume, difficulty urinating, flank pain, frequency, hematuria and urgency.   Musculoskeletal: Negative for arthralgias, back pain, gait problem, myalgias, neck pain and neck stiffness.   Skin: Negative for color change, rash and wound.   Allergic/Immunologic: Positive for immunocompromised state.   Neurological: Positive for weakness. Negative for dizziness, tremors, seizures, syncope, speech difficulty, light-headedness and headaches.   Hematological: Negative.    Psychiatric/Behavioral: Negative for agitation, behavioral problems, confusion and sleep disturbance. The patient is not nervous/anxious.    All other systems reviewed and are negative.    Objective:     Vital Signs (Most Recent):  Temp: 98 °F (36.7 °C) (12/30/19 1944)  Pulse: 86 (12/30/19 1944)  Resp: 18 (12/30/19 1944)  BP: (!) 167/90 (12/30/19 1944)  SpO2: 95 % (12/30/19 1944) Vital Signs (24h Range):  Temp:  [97.4 °F (36.3 °C)-98.2 °F (36.8 °C)] 98 °F (36.7 °C)  Pulse:  [85-90] 86  Resp:  [16-33] 18  SpO2:  [95 %-100 %] 95 %  BP: (134-167)/() 167/90     Weight: 98.2 kg (216 lb 7.9 oz)  Body mass index  is 30.19 kg/m².    Physical Exam   Constitutional: He is oriented to person, place, and time. He appears well-developed and well-nourished. No distress.   HENT:   Head: Normocephalic and atraumatic.   Nose: Nose normal.   Mouth/Throat: Oropharynx is clear and moist.   Eyes: Pupils are equal, round, and reactive to light. Conjunctivae and EOM are normal. Right eye exhibits no discharge. Left eye exhibits no discharge.   Neck: Normal range of motion. Neck supple. No JVD present.   Cardiovascular: Normal rate, regular rhythm, normal heart sounds and intact distal pulses. Exam reveals no gallop and no friction rub.   No murmur heard.  Pulmonary/Chest: Effort normal. No respiratory distress. He has no wheezes. He has rales in the left lower field. He exhibits no tenderness.   Abdominal: Soft. Bowel sounds are normal. He exhibits no distension and no mass. There is no tenderness.   Genitourinary:   Genitourinary Comments: deferred   Musculoskeletal: Normal range of motion. He exhibits no edema, tenderness or deformity.   RAKA   Neurological: He is alert and oriented to person, place, and time. He exhibits normal muscle tone. Coordination normal.   Skin: Skin is warm and dry. Capillary refill takes less than 2 seconds. No rash noted. He is not diaphoretic. No erythema.   Psychiatric: He has a normal mood and affect. His behavior is normal.   Nursing note and vitals reviewed.        CRANIAL NERVES     CN III, IV, VI   Pupils are equal, round, and reactive to light.  Extraocular motions are normal.        Significant Labs:   Recent Lab Results       12/30/19 1959 12/30/19  1842   12/30/19  1741        Procalcitonin     0.03  Comment:  A concentration < 0.25 ng/mL represents a low risk bacterial   infection.  Procalcitonin may not be accurate among patients with localized   infection, recent trauma or major surgery, immunosuppressed state,   invasive fungal infection, renal dysfunction. Decisions regarding   initiation or  continuation of antibiotic therapy should not be based   solely on procalcitonin levels.       Albumin     3.8     Alkaline Phosphatase     72     Allens Test   Pass       ALT     60     Anion Gap     15     AST     48     Baso #     0.01     Basophil%     0.2     BILIRUBIN TOTAL     0.5  Comment:  For infants and newborns, interpretation of results should be based  on gestational age, weight and in agreement with clinical  observations.  Premature Infant recommended reference ranges:  Up to 24 hours.............<8.0 mg/dL  Up to 48 hours............<12.0 mg/dL  3-5 days..................<15.0 mg/dL  6-29 days.................<15.0 mg/dL       BNP 52  Comment:  Values of less than 100 pg/ml are consistent with non-CHF populations.         Site   LR       BUN, Bld     19     Calcium     9.3     Chloride     102     CO2     21     Creatinine     1.6     DelSys   Nasal Can       Differential Method     Automated     eGFR if      51     eGFR if non      44  Comment:  Calculation used to obtain the estimated glomerular filtration  rate (eGFR) is the CKD-EPI equation.        Eos #     0.0     Eosinophil%     0.5     FiO2   28       Flow   2       Glucose     143     Gran # (ANC)     3.8     Gran%     68.2     Hematocrit     44.2     Hemoglobin     13.9     Immature Grans (Abs)     0.01  Comment:  Mild elevation in immature granulocytes is non specific and   can be seen in a variety of conditions including stress response,   acute inflammation, trauma and pregnancy. Correlation with other   laboratory and clinical findings is essential.       Immature Granulocytes     0.2     Lactate, Massimo 1.8  Comment:  Falsely low lactic acid results can be found in samples   containing >=13.0 mg/dL total bilirubin and/or >=3.5 mg/dL   direct bilirubin.     2.2  Comment:  Falsely low lactic acid results can be found in samples   containing >=13.0 mg/dL total bilirubin and/or >=3.5 mg/dL   direct  bilirubin.       Lymph #     1.3     Lymph%     23.3     MCH     28.3     MCHC     31.4     MCV     90     Mode   SPONT       Mono #     0.4     Mono%     7.6     MPV     9.9     nRBC     0     Platelets     193     POC BE   0       POC HCO3   24.3       POC PCO2   37.7       POC PH   7.417       POC PO2   102       POC SATURATED O2   98       Potassium     4.4     PROTEIN TOTAL     7.6     RBC     4.92     RDW     13.8     Sample   ARTERIAL       Sodium     138     Troponin I     <0.006  Comment:  The reference interval for Troponin I represents the 99th percentile   cutoff   for our facility and is consistent with 3rd generation assay   performance.       WBC     5.53         All pertinent labs within the past 24 hours have been reviewed.    Significant Imaging: I have reviewed all pertinent imaging results/findings within the past 24 hours.    Assessment/Plan:     Pneumonia of both lungs due to infectious organism  --IV abx  --xopenex, brovana, budesonide via nebulizer  --incentive spirometry  --supplemental oxygen   --sputum cx pending  --BC pending    Squamous cell carcinoma, scalp/neck  --dressing c/d/i  --will f/u OP      Hx of right BKA  --well healed        Type 2 diabetes mellitus with retinopathy, with long-term current use of insulin  --accuchecks and ssi  --diabetic diet  --HA1C pending    Essential hypertension  --continue amlodipine, isosorbide, metoprolol  --cardiac diet        VTE Risk Mitigation (From admission, onward)         Ordered     heparin (porcine) injection 5,000 Units  Every 8 hours      12/30/19 2127     IP VTE HIGH RISK PATIENT  Once      12/30/19 2127                   THIAGO Salas-C  Department of Hospital Medicine   [facility]

## 2019-12-31 NOTE — ED NOTES
Patient unable to be placed on cardiac monitor at present secondary to availability. Dr. Romano notified.

## 2019-12-31 NOTE — HPI
64 y.o. male patient with a PMHx of PVD, PAD, HTN, hyperlipidemia, GERD, diastolic heart failure, CRI, CAD, COPD, CHF, and arthritis who presents to the ED with c/o SOB which onset gradually over the past several weeks. Associated symptoms include CP, edema, cough, and general malaise. Symptoms are constant and moderate in severity. No mitigating or exacerbating factors reported. Patient denies any recent travel, long car trips, fever, N/V/D, congestion, rhinorrhea, chills, sore throat, HA, dizziness, numbness, weakness, back pain, dysuria, hematuria, and all other sxs at this time. Prior Tx includes humidifiers and Albuterol inhalers at home with no relief.   He is a full code and his SDM is his sister,  Kecia.  He will be admitted to med/surg for bilat pneumonia under the care of [facility].

## 2019-12-31 NOTE — SUBJECTIVE & OBJECTIVE
Interval History: Will continue current POC.     Review of Systems   Constitutional: Negative for activity change, appetite change, chills, fatigue and fever.   HENT: Negative for dental problem, ear pain, hearing loss, mouth sores, nosebleeds, sinus pressure, sore throat, tinnitus and trouble swallowing.    Eyes: Negative for pain, discharge and visual disturbance.   Respiratory: Positive for cough and shortness of breath. Negative for choking, chest tightness and wheezing.    Cardiovascular: Negative for chest pain, palpitations and leg swelling.   Gastrointestinal: Negative for abdominal distention, abdominal pain, anal bleeding, blood in stool, constipation, diarrhea, nausea and vomiting.   Endocrine: Negative for cold intolerance, heat intolerance, polydipsia, polyphagia and polyuria.   Genitourinary: Negative for decreased urine volume, difficulty urinating, flank pain, frequency, hematuria and urgency.   Musculoskeletal: Negative for arthralgias, back pain, gait problem, myalgias, neck pain and neck stiffness.   Skin: Negative for color change, rash and wound.   Allergic/Immunologic: Positive for immunocompromised state.   Neurological: Positive for weakness. Negative for dizziness, tremors, seizures, syncope, speech difficulty, light-headedness and headaches.   Hematological: Negative.    Psychiatric/Behavioral: Negative for agitation, behavioral problems, confusion and sleep disturbance. The patient is not nervous/anxious.    All other systems reviewed and are negative.    Objective:     Vital Signs (Most Recent):  Temp: 98 °F (36.7 °C) (12/31/19 0807)  Pulse: 80 (12/31/19 1502)  Resp: 18 (12/31/19 1502)  BP: (!) 164/84 (12/31/19 0807)  SpO2: 96 % (12/31/19 1502) Vital Signs (24h Range):  Temp:  [98 °F (36.7 °C)-98.7 °F (37.1 °C)] 98 °F (36.7 °C)  Pulse:  [80-88] 80  Resp:  [16-33] 18  SpO2:  [94 %-100 %] 96 %  BP: (113-167)/() 164/84     Weight: 98.2 kg (216 lb 7.9 oz)  Body mass index is 30.19  kg/m².    Intake/Output Summary (Last 24 hours) at 12/31/2019 1712  Last data filed at 12/31/2019 0303  Gross per 24 hour   Intake 220 ml   Output 675 ml   Net -455 ml      Physical Exam   Constitutional: He is oriented to person, place, and time. He appears well-developed and well-nourished. No distress.   HENT:   Head: Normocephalic and atraumatic.   Nose: Nose normal.   Mouth/Throat: Oropharynx is clear and moist.   Eyes: Pupils are equal, round, and reactive to light. Conjunctivae and EOM are normal. Right eye exhibits no discharge. Left eye exhibits no discharge.   Neck: Normal range of motion. Neck supple. No JVD present.   Cardiovascular: Normal rate, regular rhythm, normal heart sounds and intact distal pulses. Exam reveals no gallop and no friction rub.   No murmur heard.  Pulmonary/Chest: Effort normal. No respiratory distress. He has no wheezes. He has rales in the left lower field. He exhibits no tenderness.   Abdominal: Soft. Bowel sounds are normal. He exhibits no distension and no mass. There is no tenderness.   Genitourinary:   Genitourinary Comments: deferred   Musculoskeletal: Normal range of motion. He exhibits no edema, tenderness or deformity.   Right AKA    Neurological: He is alert and oriented to person, place, and time. He exhibits normal muscle tone. Coordination normal.   Skin: Skin is warm and dry. Capillary refill takes less than 2 seconds. No rash noted. He is not diaphoretic. No erythema.   Psychiatric: He has a normal mood and affect. His behavior is normal.   Nursing note and vitals reviewed.      Significant Labs:   Blood Culture:   Recent Labs   Lab 12/30/19  1740 12/30/19  1741   LABBLOO No Growth to date No Growth to date     BMP:   Recent Labs   Lab 12/31/19  0456         K 3.8      CO2 22*   BUN 14   CREATININE 1.4   CALCIUM 8.8   MG 1.8     CBC:   Recent Labs   Lab 12/29/19  1748 12/30/19  1741 12/31/19  0456   WBC 5.39 5.53 3.55*   HGB 12.9* 13.9* 12.3*    HCT 41.7 44.2 40.0    193 170     CMP:   Recent Labs   Lab 12/29/19  1748 12/30/19  1741 12/31/19  0456    138 139   K 5.0 4.4 3.8    102 104   CO2 17* 21* 22*   * 143* 102   BUN 20 19 14   CREATININE 1.7* 1.6* 1.4   CALCIUM 9.1 9.3 8.8   PROT 6.9 7.6  --    ALBUMIN 3.5 3.8  --    BILITOT 0.4 0.5  --    ALKPHOS 74 72  --    AST 79* 48*  --    ALT 68* 60*  --    ANIONGAP 15 15 13   EGFRNONAA 41* 44* 52*     All pertinent labs within the past 24 hours have been reviewed.    Significant Imaging:   Imaging Results          X-Ray Chest AP Portable (Final result)  Result time 12/30/19 17:28:20    Final result by Ronny Hoang MD (12/30/19 17:28:20)                 Impression:      Increasing infiltrates or atelectatic change in the lung bases compared to prior day's exam.      Electronically signed by: Ronny Hoang  Date:    12/30/2019  Time:    17:28             Narrative:    EXAMINATION:  XR CHEST AP PORTABLE    CLINICAL HISTORY:  Sepsis;    COMPARISON:  12/29/2019    FINDINGS:  The heart is normal in size.  The aorta is atherosclerotic.  Increasing linear opacities in both lung bases.

## 2019-12-31 NOTE — NURSING
Chart reviewed for diabetes  Patient has been seen by this nurse on previous admit  Glucose stable    No further action at this time

## 2019-12-31 NOTE — PLAN OF CARE
CM met with patient at bedside to assess for discharge needs. Pt states that he is current with [facility] and would like to resume with them upon discharge. Choice from signed. He states that he uses a cane, walker, wheelchair and glucometer at home for assistance. He states he needs home O2. He denies any other needs post discharge. CM provided a transitional care folder, information on advanced directives, information on pharmacy bedside delivery, and discharge planning begins on admission with contact information for any needs/questions.    D/C Plan: Home health  PCP: Rishabh Esteban MD  [facility]erred [facility]acy[facility]  Discharge transportation: Family  [facility]: Active  Pharmacy Bedside Delivery: Yes       12/31/19 1100   Discharge Assessment   Assessment Type Discharge Planning Assessment   Confirmed/corrected address and phone number on facesheet? Yes   Assessment information obtained from? Patient   Expected Length of Stay (days)   (TBD)   Communicated expected length of stay with patient/caregiver yes   Prior to hospitilization cognitive status: Alert/Oriented   Prior to hospitalization functional status: Independent;Assistive Equipment   Current cognitive status: Alert/Oriented   Current Functional Status: Independent;Assistive Equipment   Facility Arrived From: Home   Lives With alone   Able to Return to Prior Arrangements yes   Is patient able to care for self after discharge? Yes   Who are your caregiver(s) and their phone number(s)? Kecia Sagastume, Sister- [phone]   Patient's perception of discharge disposition home or selfcare   Readmission Within the Last 30 Days no previous admission in last 30 days   Patient currently being followed by outpatient case management? No   Patient currently receives home health services? Yes   Patient currently receives any other outside agency services? Yes   Name and contact number of agency or person providing outside services [facility]   Is it the patient/care giver preference to resume care with the current outside agency? Yes   Equipment Currently Used at Home cane, straight;walker, standard;wheelchair;glucometer   Do you have any problems affording any of your prescribed medications? No   Is the patient taking medications as prescribed? yes   Does the patient have transportation home? Yes   Transportation Anticipated family or friend will provide   Dialysis Name and Scheduled days NA   Does the patient receive services at the [facility]? No   Discharge Plan A Home Health   DME Needed Upon Discharge  none   Patient/Family in Agreement with Plan yes

## 2020-01-01 LAB
ANION GAP SERPL CALC-SCNC: 13 MMOL/L (ref 8–16)
BASOPHILS # BLD AUTO: 0.02 K/UL (ref 0–0.2)
BASOPHILS NFR BLD: 0.5 % (ref 0–1.9)
BUN SERPL-MCNC: 17 MG/DL (ref 8–23)
CALCIUM SERPL-MCNC: 9.2 MG/DL (ref 8.7–10.5)
CHLORIDE SERPL-SCNC: 104 MMOL/L (ref 95–110)
CO2 SERPL-SCNC: 20 MMOL/L (ref 23–29)
CREAT SERPL-MCNC: 1.9 MG/DL (ref 0.5–1.4)
DIFFERENTIAL METHOD: ABNORMAL
EOSINOPHIL # BLD AUTO: 0.1 K/UL (ref 0–0.5)
EOSINOPHIL NFR BLD: 3.1 % (ref 0–8)
ERYTHROCYTE [DISTWIDTH] IN BLOOD BY AUTOMATED COUNT: 14.1 % (ref 11.5–14.5)
EST. GFR  (AFRICAN AMERICAN): 42 ML/MIN/1.73 M^2
EST. GFR  (NON AFRICAN AMERICAN): 36 ML/MIN/1.73 M^2
GLUCOSE SERPL-MCNC: 157 MG/DL (ref 70–110)
HCT VFR BLD AUTO: 42.6 % (ref 40–54)
HGB BLD-MCNC: 13 G/DL (ref 14–18)
IMM GRANULOCYTES # BLD AUTO: 0.01 K/UL (ref 0–0.04)
IMM GRANULOCYTES NFR BLD AUTO: 0.3 % (ref 0–0.5)
LYMPHOCYTES # BLD AUTO: 1.2 K/UL (ref 1–4.8)
LYMPHOCYTES NFR BLD: 31.5 % (ref 18–48)
MAGNESIUM SERPL-MCNC: 2 MG/DL (ref 1.6–2.6)
MCH RBC QN AUTO: 28.3 PG (ref 27–31)
MCHC RBC AUTO-ENTMCNC: 30.5 G/DL (ref 32–36)
MCV RBC AUTO: 93 FL (ref 82–98)
MONOCYTES # BLD AUTO: 0.5 K/UL (ref 0.3–1)
MONOCYTES NFR BLD: 13.1 % (ref 4–15)
NEUTROPHILS # BLD AUTO: 2 K/UL (ref 1.8–7.7)
NEUTROPHILS NFR BLD: 51.5 % (ref 38–73)
NRBC BLD-RTO: 0 /100 WBC
PHOSPHATE SERPL-MCNC: 3.3 MG/DL (ref 2.7–4.5)
PLATELET # BLD AUTO: 172 K/UL (ref 150–350)
PMV BLD AUTO: 10.4 FL (ref 9.2–12.9)
POCT GLUCOSE: 146 MG/DL (ref 70–110)
POCT GLUCOSE: 165 MG/DL (ref 70–110)
POTASSIUM SERPL-SCNC: 4.3 MMOL/L (ref 3.5–5.1)
RBC # BLD AUTO: 4.59 M/UL (ref 4.6–6.2)
SODIUM SERPL-SCNC: 137 MMOL/L (ref 136–145)
TROPONIN I SERPL DL<=0.01 NG/ML-MCNC: 0.02 NG/ML (ref 0–0.03)
WBC # BLD AUTO: 3.9 K/UL (ref 3.9–12.7)

## 2020-01-01 PROCEDURE — 85025 COMPLETE CBC W/AUTO DIFF WBC: CPT | Mod: HCNC

## 2020-01-01 PROCEDURE — 63600175 PHARM REV CODE 636 W HCPCS: Mod: HCNC | Performed by: NURSE PRACTITIONER

## 2020-01-01 PROCEDURE — 94640 AIRWAY INHALATION TREATMENT: CPT | Mod: HCNC

## 2020-01-01 PROCEDURE — 94761 N-INVAS EAR/PLS OXIMETRY MLT: CPT | Mod: HCNC

## 2020-01-01 PROCEDURE — 93010 EKG 12-LEAD: ICD-10-PCS | Mod: HCNC,,, | Performed by: INTERNAL MEDICINE

## 2020-01-01 PROCEDURE — 93010 ELECTROCARDIOGRAM REPORT: CPT | Mod: HCNC,,, | Performed by: INTERNAL MEDICINE

## 2020-01-01 PROCEDURE — 96372 THER/PROPH/DIAG INJ SC/IM: CPT | Mod: HCNC

## 2020-01-01 PROCEDURE — 25000003 PHARM REV CODE 250: Mod: HCNC | Performed by: NURSE PRACTITIONER

## 2020-01-01 PROCEDURE — 36415 COLL VENOUS BLD VENIPUNCTURE: CPT | Mod: HCNC

## 2020-01-01 PROCEDURE — 25000242 PHARM REV CODE 250 ALT 637 W/ HCPCS: Mod: HCNC | Performed by: NURSE PRACTITIONER

## 2020-01-01 PROCEDURE — 84100 ASSAY OF PHOSPHORUS: CPT | Mod: HCNC

## 2020-01-01 PROCEDURE — 84484 ASSAY OF TROPONIN QUANT: CPT | Mod: HCNC

## 2020-01-01 PROCEDURE — 93005 ELECTROCARDIOGRAM TRACING: CPT | Mod: HCNC

## 2020-01-01 PROCEDURE — 21400001 HC TELEMETRY ROOM: Mod: HCNC

## 2020-01-01 PROCEDURE — 83735 ASSAY OF MAGNESIUM: CPT | Mod: HCNC

## 2020-01-01 PROCEDURE — 80048 BASIC METABOLIC PNL TOTAL CA: CPT | Mod: HCNC

## 2020-01-01 RX ORDER — CEFEPIME HYDROCHLORIDE 1 G/50ML
1 INJECTION, SOLUTION INTRAVENOUS
Status: DISCONTINUED | OUTPATIENT
Start: 2020-01-01 | End: 2020-01-02 | Stop reason: HOSPADM

## 2020-01-01 RX ADMIN — HYDROCODONE BITARTRATE AND ACETAMINOPHEN 1 TABLET: 10; 325 TABLET ORAL at 02:01

## 2020-01-01 RX ADMIN — ARFORMOTEROL TARTRATE 15 MCG: 15 SOLUTION RESPIRATORY (INHALATION) at 07:01

## 2020-01-01 RX ADMIN — LEVALBUTEROL 0.63 MG: 0.63 SOLUTION RESPIRATORY (INHALATION) at 04:01

## 2020-01-01 RX ADMIN — METOPROLOL TARTRATE 25 MG: 25 TABLET ORAL at 08:01

## 2020-01-01 RX ADMIN — CLOPIDOGREL BISULFATE 75 MG: 75 TABLET ORAL at 08:01

## 2020-01-01 RX ADMIN — LEVALBUTEROL 0.63 MG: 0.63 SOLUTION RESPIRATORY (INHALATION) at 07:01

## 2020-01-01 RX ADMIN — CEFEPIME HYDROCHLORIDE 1 G: 1 INJECTION, SOLUTION INTRAVENOUS at 03:01

## 2020-01-01 RX ADMIN — GABAPENTIN 300 MG: 300 CAPSULE ORAL at 02:01

## 2020-01-01 RX ADMIN — HYDROCODONE BITARTRATE AND ACETAMINOPHEN 1 TABLET: 10; 325 TABLET ORAL at 08:01

## 2020-01-01 RX ADMIN — GABAPENTIN 300 MG: 300 CAPSULE ORAL at 08:01

## 2020-01-01 RX ADMIN — HEPARIN SODIUM 5000 UNITS: 5000 INJECTION INTRAVENOUS; SUBCUTANEOUS at 02:01

## 2020-01-01 RX ADMIN — PIPERACILLIN AND TAZOBACTAM 4.5 G: 4; .5 INJECTION, POWDER, FOR SOLUTION INTRAVENOUS at 11:01

## 2020-01-01 RX ADMIN — MUPIROCIN: 20 OINTMENT TOPICAL at 08:01

## 2020-01-01 RX ADMIN — BUDESONIDE 0.5 MG: 0.5 SUSPENSION RESPIRATORY (INHALATION) at 07:01

## 2020-01-01 RX ADMIN — PIPERACILLIN AND TAZOBACTAM 4.5 G: 4; .5 INJECTION, POWDER, FOR SOLUTION INTRAVENOUS at 02:01

## 2020-01-01 RX ADMIN — HEPARIN SODIUM 5000 UNITS: 5000 INJECTION INTRAVENOUS; SUBCUTANEOUS at 10:01

## 2020-01-01 RX ADMIN — AMLODIPINE BESYLATE 10 MG: 10 TABLET ORAL at 08:01

## 2020-01-01 RX ADMIN — ISOSORBIDE MONONITRATE 60 MG: 30 TABLET, EXTENDED RELEASE ORAL at 08:01

## 2020-01-01 RX ADMIN — ASPIRIN 81 MG: 81 TABLET, COATED ORAL at 08:01

## 2020-01-01 RX ADMIN — ATORVASTATIN CALCIUM 80 MG: 40 TABLET, FILM COATED ORAL at 08:01

## 2020-01-01 RX ADMIN — HEPARIN SODIUM 5000 UNITS: 5000 INJECTION INTRAVENOUS; SUBCUTANEOUS at 05:01

## 2020-01-01 NOTE — SUBJECTIVE & OBJECTIVE
Interval History: WBCs remain normal, afebrile. Creatinine 1.9 up from 1.4. IV zosyn switched to IV cefepime. Patient c/o left arm pain US negative for DVT.      Review of Systems   Constitutional: Negative for activity change, appetite change, chills, fatigue and fever.   HENT: Negative for dental problem, ear pain, hearing loss, mouth sores, nosebleeds, sinus pressure, sore throat, tinnitus and trouble swallowing.    Eyes: Negative for pain, discharge and visual disturbance.   Respiratory: Positive for cough and shortness of breath. Negative for choking, chest tightness and wheezing.    Cardiovascular: Negative for chest pain, palpitations and leg swelling.   Gastrointestinal: Negative for abdominal distention, abdominal pain, anal bleeding, blood in stool, constipation, diarrhea, nausea and vomiting.   Endocrine: Negative for cold intolerance, heat intolerance, polydipsia, polyphagia and polyuria.   Genitourinary: Negative for decreased urine volume, difficulty urinating, flank pain, frequency, hematuria and urgency.   Musculoskeletal: Negative for arthralgias, back pain, gait problem, myalgias, neck pain and neck stiffness.   Skin: Negative for color change, rash and wound.   Allergic/Immunologic: Positive for immunocompromised state.   Neurological: Positive for weakness. Negative for dizziness, tremors, seizures, syncope, speech difficulty, light-headedness and headaches.   Hematological: Negative.    Psychiatric/Behavioral: Negative for agitation, behavioral problems, confusion and sleep disturbance. The patient is not nervous/anxious.    All other systems reviewed and are negative.    Objective:     Vital Signs (Most Recent):  Temp: 98.1 °F (36.7 °C) (01/01/20 1224)  Pulse: 80 (01/01/20 1224)  Resp: 18 (01/01/20 1224)  BP: 131/84 (01/01/20 1224)  SpO2: 97 % (01/01/20 1224) Vital Signs (24h Range):  Temp:  [97.1 °F (36.2 °C)-98.3 °F (36.8 °C)] 98.1 °F (36.7 °C)  Pulse:  [73-85] 80  Resp:  [18-20] 18  SpO2:   [93 %-97 %] 97 %  BP: (131-165)/(61-96) 131/84     Weight: 98.2 kg (216 lb 7.9 oz)  Body mass index is 30.19 kg/m².    Intake/Output Summary (Last 24 hours) at 1/1/2020 1518  Last data filed at 1/1/2020 1126  Gross per 24 hour   Intake 650 ml   Output 1250 ml   Net -600 ml      Physical Exam   Constitutional: He is oriented to person, place, and time. He appears well-developed and well-nourished. No distress.   HENT:   Head: Normocephalic and atraumatic.   Nose: Nose normal.   Mouth/Throat: Oropharynx is clear and moist.   Eyes: Pupils are equal, round, and reactive to light. Conjunctivae and EOM are normal. Right eye exhibits no discharge. Left eye exhibits no discharge.   Neck: Normal range of motion. Neck supple. No JVD present.   Cardiovascular: Normal rate, regular rhythm, normal heart sounds and intact distal pulses. Exam reveals no gallop and no friction rub.   No murmur heard.  Pulmonary/Chest: Effort normal. No respiratory distress. He has no wheezes. He has rales in the left lower field. He exhibits no tenderness.   Abdominal: Soft. Bowel sounds are normal. He exhibits no distension and no mass. There is no tenderness.   Genitourinary:   Genitourinary Comments: deferred   Musculoskeletal: Normal range of motion. He exhibits no edema, tenderness or deformity.   Right AKA    Neurological: He is alert and oriented to person, place, and time. He exhibits normal muscle tone. Coordination normal.   Skin: Skin is warm and dry. Capillary refill takes less than 2 seconds. No rash noted. He is not diaphoretic. No erythema.   Psychiatric: He has a normal mood and affect. His behavior is normal.   Nursing note and vitals reviewed.      Significant Labs:   Blood Culture:   Recent Labs   Lab 12/30/19  1740 12/30/19  1741   LABBLOO No Growth to date  No Growth to date No Growth to date  No Growth to date     BMP:   Recent Labs   Lab 01/01/20  0435   *      K 4.3      CO2 20*   BUN 17   CREATININE  1.9*   CALCIUM 9.2   MG 2.0     CBC:   Recent Labs   Lab 12/30/19  1741 12/31/19  0456 01/01/20  0435   WBC 5.53 3.55* 3.90   HGB 13.9* 12.3* 13.0*   HCT 44.2 40.0 42.6    170 172     CMP:   Recent Labs   Lab 12/30/19  1741 12/31/19  0456 01/01/20  0435    139 137   K 4.4 3.8 4.3    104 104   CO2 21* 22* 20*   * 102 157*   BUN 19 14 17   CREATININE 1.6* 1.4 1.9*   CALCIUM 9.3 8.8 9.2   PROT 7.6  --   --    ALBUMIN 3.8  --   --    BILITOT 0.5  --   --    ALKPHOS 72  --   --    AST 48*  --   --    ALT 60*  --   --    ANIONGAP 15 13 13   EGFRNONAA 44* 52* 36*     All pertinent labs within the past 24 hours have been reviewed.    Significant Imaging:  Imaging Results          X-Ray Chest AP Portable (Final result)  Result time 12/30/19 17:28:20    Final result by Ronny Hoang MD (12/30/19 17:28:20)                 Impression:      Increasing infiltrates or atelectatic change in the lung bases compared to prior day's exam.      Electronically signed by: Ronny Hoang  Date:    12/30/2019  Time:    17:28             Narrative:    EXAMINATION:  XR CHEST AP PORTABLE    CLINICAL HISTORY:  Sepsis;    COMPARISON:  12/29/2019    FINDINGS:  The heart is normal in size.  The aorta is atherosclerotic.  Increasing linear opacities in both lung bases.

## 2020-01-01 NOTE — ASSESSMENT & PLAN NOTE
--IV abx  --xopenex, brovana, budesonide via nebulizer  --incentive spirometry  --supplemental oxygen   --sputum cx pending  --BC pending  12/31/19  -Blood cx remain negative   -Continue current POC   1/1/20  -IV zosyn switched to IV cefepime   -BC show NGTD   -Remains afebrile, WBCs normal

## 2020-01-01 NOTE — PROGRESS NOTES
Therapy with vancomycin complete and/or consult discontinued by provider.  Pharmacy will sign off, please re-consult as needed.    Thank you for allowing us to participate in this patient's care.   Radha Lopez, PharmD 1/1/2020 4:06 PM

## 2020-01-01 NOTE — PLAN OF CARE
Chart reviewed, pt resting in bed with call light and personal belongings within reach. Pt getting Iv antibiotics. Pain controlled with pain medication ordered in MAR. Heart monitor 8636, vitals stable, 2L NC. Pt complains of left arm pain, heating pads were given and pt had no relief. Per NP on call day physician will handle any imaging orders or anything needed on his left arm. Pt was explained info. Blood sugar monitored. Pt absent of injury throughout shift, will continue to monitor.

## 2020-01-01 NOTE — NURSING
Pt complaining of left arm pain and is requesting an imaging done. Per night NP day shift will handle any ordering of imaging that is needed. Pt stated this is nothing new that he has been having this issue

## 2020-01-01 NOTE — PROGRESS NOTES
[facility] Hospital Medicine  Progress Note    Patient Name: Lavelle Ladd  MRN: [id number]  Patient Class: IP- Inpatient   Admission Date: 12/30/2019  Length of Stay: 2 days  Attending Physician: Chapin Cr MD  Primary Care Provider: Rishabh Esteban MD        Subjective:     Principal Problem:Pneumonia of both lungs due to infectious organism        HPI:  64 y.o. male patient with a PMHx of PVD, PAD, HTN, hyperlipidemia, GERD, diastolic heart failure, CRI, CAD, COPD, CHF, and arthritis who presents to the ED with c/o SOB which onset gradually over the past several weeks. Associated symptoms include CP, edema, cough, and general malaise. Symptoms are constant and moderate in severity. No mitigating or exacerbating factors reported. Patient denies any recent travel, long car trips, fever, N/V/D, congestion, rhinorrhea, chills, sore throat, HA, dizziness, numbness, weakness, back pain, dysuria, hematuria, and all other sxs at this time. Prior Tx includes humidifiers and Albuterol inhalers at home with no relief.   He is a full code and his SDM is his sister,  Kecia.  He will be admitted to med/surg for bilat pneumonia under the care of [facility].     Overview/Hospital Course:  64 year old male admitted for bilateral PNA. Patient being treated with IV zosyn, duoneb tx, and supplemental oxygen. Blood culture remain negative. 1/1/20- WBCs remain normal, afebrile. Creatinine 1.9 up from 1.4. IV zosyn switched to IV cefepime. Patient c/o left arm pain US negative for DVT.       Interval History: WBCs remain normal, afebrile. Creatinine 1.9 up from 1.4. IV zosyn switched to IV cefepime. Patient c/o left arm pain US negative for DVT.      Review of Systems   Constitutional: Negative for activity change, appetite change, chills, fatigue and fever.   HENT: Negative for dental problem, ear pain, hearing loss, mouth sores, nosebleeds, sinus pressure, sore throat, tinnitus and trouble swallowing.     Eyes: Negative for pain, discharge and visual disturbance.   Respiratory: Positive for cough and shortness of breath. Negative for choking, chest tightness and wheezing.    Cardiovascular: Negative for chest pain, palpitations and leg swelling.   Gastrointestinal: Negative for abdominal distention, abdominal pain, anal bleeding, blood in stool, constipation, diarrhea, nausea and vomiting.   Endocrine: Negative for cold intolerance, heat intolerance, polydipsia, polyphagia and polyuria.   Genitourinary: Negative for decreased urine volume, difficulty urinating, flank pain, frequency, hematuria and urgency.   Musculoskeletal: Negative for arthralgias, back pain, gait problem, myalgias, neck pain and neck stiffness.   Skin: Negative for color change, rash and wound.   Allergic/Immunologic: Positive for immunocompromised state.   Neurological: Positive for weakness. Negative for dizziness, tremors, seizures, syncope, speech difficulty, light-headedness and headaches.   Hematological: Negative.    Psychiatric/Behavioral: Negative for agitation, behavioral problems, confusion and sleep disturbance. The patient is not nervous/anxious.    All other systems reviewed and are negative.    Objective:     Vital Signs (Most Recent):  Temp: 98.1 °F (36.7 °C) (01/01/20 1224)  Pulse: 80 (01/01/20 1224)  Resp: 18 (01/01/20 1224)  BP: 131/84 (01/01/20 1224)  SpO2: 97 % (01/01/20 1224) Vital Signs (24h Range):  Temp:  [97.1 °F (36.2 °C)-98.3 °F (36.8 °C)] 98.1 °F (36.7 °C)  Pulse:  [73-85] 80  Resp:  [18-20] 18  SpO2:  [93 %-97 %] 97 %  BP: (131-165)/(61-96) 131/84     Weight: 98.2 kg (216 lb 7.9 oz)  Body mass index is 30.19 kg/m².    Intake/Output Summary (Last 24 hours) at 1/1/2020 1518  Last data filed at 1/1/2020 1126  Gross per 24 hour   Intake 650 ml   Output 1250 ml   Net -600 ml      Physical Exam   Constitutional: He is oriented to person, place, and time. He appears well-developed and well-nourished. No distress.   HENT:    Head: Normocephalic and atraumatic.   Nose: Nose normal.   Mouth/Throat: Oropharynx is clear and moist.   Eyes: Pupils are equal, round, and reactive to light. Conjunctivae and EOM are normal. Right eye exhibits no discharge. Left eye exhibits no discharge.   Neck: Normal range of motion. Neck supple. No JVD present.   Cardiovascular: Normal rate, regular rhythm, normal heart sounds and intact distal pulses. Exam reveals no gallop and no friction rub.   No murmur heard.  Pulmonary/Chest: Effort normal. No respiratory distress. He has no wheezes. He has rales in the left lower field. He exhibits no tenderness.   Abdominal: Soft. Bowel sounds are normal. He exhibits no distension and no mass. There is no tenderness.   Genitourinary:   Genitourinary Comments: deferred   Musculoskeletal: Normal range of motion. He exhibits no edema, tenderness or deformity.   Right AKA    Neurological: He is alert and oriented to person, place, and time. He exhibits normal muscle tone. Coordination normal.   Skin: Skin is warm and dry. Capillary refill takes less than 2 seconds. No rash noted. He is not diaphoretic. No erythema.   Psychiatric: He has a normal mood and affect. His behavior is normal.   Nursing note and vitals reviewed.      Significant Labs:   Blood Culture:   Recent Labs   Lab 12/30/19  1740 12/30/19 1741   LABBLOO No Growth to date  No Growth to date No Growth to date  No Growth to date     BMP:   Recent Labs   Lab 01/01/20  0435   *      K 4.3      CO2 20*   BUN 17   CREATININE 1.9*   CALCIUM 9.2   MG 2.0     CBC:   Recent Labs   Lab 12/30/19  1741 12/31/19  0456 01/01/20  0435   WBC 5.53 3.55* 3.90   HGB 13.9* 12.3* 13.0*   HCT 44.2 40.0 42.6    170 172     CMP:   Recent Labs   Lab 12/30/19  1741 12/31/19  0456 01/01/20  0435    139 137   K 4.4 3.8 4.3    104 104   CO2 21* 22* 20*   * 102 157*   BUN 19 14 17   CREATININE 1.6* 1.4 1.9*   CALCIUM 9.3 8.8 9.2   PROT 7.6   --   --    ALBUMIN 3.8  --   --    BILITOT 0.5  --   --    ALKPHOS 72  --   --    AST 48*  --   --    ALT 60*  --   --    ANIONGAP 15 13 13   EGFRNONAA 44* 52* 36*     All pertinent labs within the past 24 hours have been reviewed.    Significant Imaging:  Imaging Results          X-Ray Chest AP Portable (Final result)  Result time 12/30/19 17:28:20    Final result by Ronny Hoang MD (12/30/19 17:28:20)                 Impression:      Increasing infiltrates or atelectatic change in the lung bases compared to prior day's exam.      Electronically signed by: Ronny Hoang  Date:    12/30/2019  Time:    17:28             Narrative:    EXAMINATION:  XR CHEST AP PORTABLE    CLINICAL HISTORY:  Sepsis;    COMPARISON:  12/29/2019    FINDINGS:  The heart is normal in size.  The aorta is atherosclerotic.  Increasing linear opacities in both lung bases.                                Assessment/Plan:      * Pneumonia of both lungs due to infectious organism  --IV abx  --xopenex, brovana, budesonide via nebulizer  --incentive spirometry  --supplemental oxygen   --sputum cx pending  --BC pending  12/31/19  -Blood cx remain negative   -Continue current POC   1/1/20  -IV zosyn switched to IV cefepime   -BC show NGTD   -Remains afebrile, WBCs normal     Squamous cell carcinoma, scalp/neck  --dressing c/d/i  --will f/u OP      Hx of right BKA  --well healed        Type 2 diabetes mellitus with retinopathy, with long-term current use of insulin  --accuchecks and ssi  --diabetic diet  --HA1C 7.1    Essential hypertension  --continue amlodipine, isosorbide, metoprolol  --cardiac diet  -Bp stable         VTE Risk Mitigation (From admission, onward)         Ordered     heparin (porcine) injection 5,000 Units  Every 8 hours      12/30/19 2127     IP VTE HIGH RISK PATIENT  Once      12/30/19 2127                      Carla Irby NP  Department of Hospital Medicine   [facility]

## 2020-01-01 NOTE — PLAN OF CARE
Remains injury free. Pain managed with oral medication, relaxation and distraction. Receiving IV abx. Will monitor.

## 2020-01-01 NOTE — PROGRESS NOTES
Pharmacokinetic Assessment Follow Up: IV Vancomycin    Vancomycin serum concentration assessment(s):    The random level was drawn correctly and can be used to guide therapy at this time. The measurement is below the desired definitive target range of 15 to 20 mcg/mL.    Vancomycin Regimen Plan:    Vancomycin random level=12.3mcg/ml. Per protocol, patient should receive Vancomycin 1500mg dose (15mg/kg using ABW).  Re-dose when the random level is less than 20 mcg/mL, next level to be drawn at 2000 on 1/1     Drug levels (last 3 results):  Recent Labs   Lab Result Units 12/31/19  1825   Vancomycin, Random ug/mL 12.3       Pharmacy will continue to follow and monitor vancomycin.    Please contact pharmacy at extension [phone] for questions regarding this assessment.    Thank you for the consult,   Amy Rome       Patient brief summary:  Lavelle Ladd is a 64 y.o. male initiated on antimicrobial therapy with IV Vancomycin for treatment of lower respiratory infection    The patient's current regimen is pulse dosing due to hx of kidney transplant    Drug Allergies:   Review of patient's allergies indicates:  No Known Allergies    Actual Body Weight:   98.2kg    Renal Function:   Estimated Creatinine Clearance: 62 mL/min (based on SCr of 1.4 mg/dL).,     Dialysis Method (if applicable):  N/A    CBC (last 72 hours):  Recent Labs   Lab Result Units 12/29/19 1748 12/30/19 1741 12/31/19  0018 12/31/19  0456   WBC K/uL 5.39 5.53  --  3.55*   Hemoglobin g/dL 12.9* 13.9*  --  12.3*   Hemoglobin A1C %  --   --  7.1*  --    Hematocrit % 41.7 44.2  --  40.0   Platelets K/uL 172 193  --  170   Gran% % 63.0 68.2  --  57.7   Lymph% % 28.9 23.3  --  28.5   Mono% % 6.9 7.6  --  11.8   Eosinophil% % 0.4 0.5  --  1.4   Basophil% % 0.4 0.2  --  0.3   Differential Method  Automated Automated  --  Automated       Metabolic Panel (last 72 hours):  Recent Labs   Lab Result Units 12/29/19 1748 12/29/19  1853 12/30/19 1741  12/31/19  0309 12/31/19  0456   Sodium mmol/L 138  --  138  --  139   Potassium mmol/L 5.0  --  4.4  --  3.8   Chloride mmol/L 106  --  102  --  104   CO2 mmol/L 17*  --  21*  --  22*   Glucose mg/dL 189*  --  143*  --  102   Glucose, UA   --  Negative  --  Negative  --    BUN, Bld mg/dL 20  --  19  --  14   Creatinine mg/dL 1.7*  --  1.6*  --  1.4   Albumin g/dL 3.5  --  3.8  --   --    Total Bilirubin mg/dL 0.4  --  0.5  --   --    Alkaline Phosphatase U/L 74  --  72  --   --    AST U/L 79*  --  48*  --   --    ALT U/L 68*  --  60*  --   --    Magnesium mg/dL  --   --   --   --  1.8   Phosphorus mg/dL  --   --   --   --  3.6       Vancomycin Administrations:  vancomycin given in the last 96 hours                     vancomycin 2 g in dextrose 5 % 500 mL IVPB (mg) 2,000 mg New Bag 12/30/19 1947                      Microbiologic Results:  Microbiology Results (last 7 days)       Procedure Component Value Units Date/Time    Blood culture x two cultures. Draw prior to antibiotics. [[id number]] Collected:  12/30/19 1741    Order Status:  Completed Specimen:  Blood from Radial Arterial Stick, Left Updated:  12/31/19 0915     Blood Culture, Routine No Growth to date    Narrative:       Aerobic and anaerobic    Blood culture x two cultures. Draw prior to antibiotics. [[id number]] Collected:  12/30/19 1740    Order Status:  Completed Specimen:  Blood from Radial Arterial Stick, Right Updated:  12/31/19 0915     Blood Culture, Routine No Growth to date    Narrative:       Aerobic and anaerobic    Culture, Respiratory with Gram Stain [[id number]]     Order Status:  No result Specimen:  Respiratory

## 2020-01-02 ENCOUNTER — NURSE TRIAGE (OUTPATIENT)
Dept: ADMINISTRATIVE | Facility: CLINIC | Age: 65
End: 2020-01-02

## 2020-01-02 VITALS
SYSTOLIC BLOOD PRESSURE: 187 MMHG | OXYGEN SATURATION: 94 % | TEMPERATURE: 98 F | HEART RATE: 73 BPM | RESPIRATION RATE: 16 BRPM | HEIGHT: 71 IN | WEIGHT: 216.5 LBS | DIASTOLIC BLOOD PRESSURE: 93 MMHG | BODY MASS INDEX: 30.31 KG/M2

## 2020-01-02 LAB
ANION GAP SERPL CALC-SCNC: 10 MMOL/L (ref 8–16)
BASOPHILS # BLD AUTO: 0.01 K/UL (ref 0–0.2)
BASOPHILS NFR BLD: 0.4 % (ref 0–1.9)
BUN SERPL-MCNC: 16 MG/DL (ref 8–23)
CALCIUM SERPL-MCNC: 9.7 MG/DL (ref 8.7–10.5)
CHLORIDE SERPL-SCNC: 105 MMOL/L (ref 95–110)
CO2 SERPL-SCNC: 24 MMOL/L (ref 23–29)
CREAT SERPL-MCNC: 1.7 MG/DL (ref 0.5–1.4)
DIFFERENTIAL METHOD: ABNORMAL
EOSINOPHIL # BLD AUTO: 0.1 K/UL (ref 0–0.5)
EOSINOPHIL NFR BLD: 3.6 % (ref 0–8)
ERYTHROCYTE [DISTWIDTH] IN BLOOD BY AUTOMATED COUNT: 13.9 % (ref 11.5–14.5)
EST. GFR  (AFRICAN AMERICAN): 48 ML/MIN/1.73 M^2
EST. GFR  (NON AFRICAN AMERICAN): 41 ML/MIN/1.73 M^2
GLUCOSE SERPL-MCNC: 177 MG/DL (ref 70–110)
HCT VFR BLD AUTO: 41.3 % (ref 40–54)
HGB BLD-MCNC: 12.4 G/DL (ref 14–18)
IMM GRANULOCYTES # BLD AUTO: 0.01 K/UL (ref 0–0.04)
IMM GRANULOCYTES NFR BLD AUTO: 0.4 % (ref 0–0.5)
LYMPHOCYTES # BLD AUTO: 1.1 K/UL (ref 1–4.8)
LYMPHOCYTES NFR BLD: 39.9 % (ref 18–48)
MAGNESIUM SERPL-MCNC: 1.9 MG/DL (ref 1.6–2.6)
MCH RBC QN AUTO: 28.1 PG (ref 27–31)
MCHC RBC AUTO-ENTMCNC: 30 G/DL (ref 32–36)
MCV RBC AUTO: 94 FL (ref 82–98)
MONOCYTES # BLD AUTO: 0.5 K/UL (ref 0.3–1)
MONOCYTES NFR BLD: 16.9 % (ref 4–15)
NEUTROPHILS # BLD AUTO: 1.1 K/UL (ref 1.8–7.7)
NEUTROPHILS NFR BLD: 38.8 % (ref 38–73)
NRBC BLD-RTO: 0 /100 WBC
PHOSPHATE SERPL-MCNC: 3.4 MG/DL (ref 2.7–4.5)
PLATELET # BLD AUTO: 147 K/UL (ref 150–350)
PMV BLD AUTO: 10 FL (ref 9.2–12.9)
POCT GLUCOSE: 186 MG/DL (ref 70–110)
POTASSIUM SERPL-SCNC: 3.9 MMOL/L (ref 3.5–5.1)
RBC # BLD AUTO: 4.41 M/UL (ref 4.6–6.2)
SODIUM SERPL-SCNC: 139 MMOL/L (ref 136–145)
WBC # BLD AUTO: 2.78 K/UL (ref 3.9–12.7)

## 2020-01-02 PROCEDURE — 63600175 PHARM REV CODE 636 W HCPCS: Mod: HCNC | Performed by: NURSE PRACTITIONER

## 2020-01-02 PROCEDURE — 96372 THER/PROPH/DIAG INJ SC/IM: CPT | Mod: HCNC

## 2020-01-02 PROCEDURE — 25000242 PHARM REV CODE 250 ALT 637 W/ HCPCS: Mod: HCNC | Performed by: NURSE PRACTITIONER

## 2020-01-02 PROCEDURE — 94640 AIRWAY INHALATION TREATMENT: CPT | Mod: HCNC

## 2020-01-02 PROCEDURE — 85025 COMPLETE CBC W/AUTO DIFF WBC: CPT | Mod: HCNC

## 2020-01-02 PROCEDURE — 80048 BASIC METABOLIC PNL TOTAL CA: CPT | Mod: HCNC

## 2020-01-02 PROCEDURE — 36415 COLL VENOUS BLD VENIPUNCTURE: CPT | Mod: HCNC

## 2020-01-02 PROCEDURE — 25000003 PHARM REV CODE 250: Mod: HCNC | Performed by: NURSE PRACTITIONER

## 2020-01-02 PROCEDURE — 84100 ASSAY OF PHOSPHORUS: CPT | Mod: HCNC

## 2020-01-02 PROCEDURE — 83735 ASSAY OF MAGNESIUM: CPT | Mod: HCNC

## 2020-01-02 RX ORDER — AMOXICILLIN AND CLAVULANATE POTASSIUM 875; 125 MG/1; MG/1
1 TABLET, FILM COATED ORAL EVERY 12 HOURS
Qty: 14 TABLET | Refills: 0 | Status: SHIPPED | OUTPATIENT
Start: 2020-01-02 | End: 2020-01-09

## 2020-01-02 RX ADMIN — HEPARIN SODIUM 5000 UNITS: 5000 INJECTION INTRAVENOUS; SUBCUTANEOUS at 05:01

## 2020-01-02 RX ADMIN — ISOSORBIDE MONONITRATE 60 MG: 30 TABLET, EXTENDED RELEASE ORAL at 08:01

## 2020-01-02 RX ADMIN — ARFORMOTEROL TARTRATE 15 MCG: 15 SOLUTION RESPIRATORY (INHALATION) at 07:01

## 2020-01-02 RX ADMIN — METOPROLOL TARTRATE 25 MG: 25 TABLET ORAL at 08:01

## 2020-01-02 RX ADMIN — LEVALBUTEROL 0.63 MG: 0.63 SOLUTION RESPIRATORY (INHALATION) at 07:01

## 2020-01-02 RX ADMIN — HYDROCODONE BITARTRATE AND ACETAMINOPHEN 1 TABLET: 10; 325 TABLET ORAL at 02:01

## 2020-01-02 RX ADMIN — BUDESONIDE 0.5 MG: 0.5 SUSPENSION RESPIRATORY (INHALATION) at 07:01

## 2020-01-02 RX ADMIN — AMLODIPINE BESYLATE 10 MG: 10 TABLET ORAL at 08:01

## 2020-01-02 RX ADMIN — GABAPENTIN 300 MG: 300 CAPSULE ORAL at 08:01

## 2020-01-02 RX ADMIN — ASPIRIN 81 MG: 81 TABLET, COATED ORAL at 08:01

## 2020-01-02 RX ADMIN — HYDROCODONE BITARTRATE AND ACETAMINOPHEN 1 TABLET: 10; 325 TABLET ORAL at 08:01

## 2020-01-02 RX ADMIN — ATORVASTATIN CALCIUM 80 MG: 40 TABLET, FILM COATED ORAL at 08:01

## 2020-01-02 RX ADMIN — CLOPIDOGREL BISULFATE 75 MG: 75 TABLET ORAL at 08:01

## 2020-01-02 RX ADMIN — CEFEPIME HYDROCHLORIDE 1 G: 1 INJECTION, SOLUTION INTRAVENOUS at 05:01

## 2020-01-02 NOTE — DISCHARGE SUMMARY
[facility] Hospital Medicine  Discharge Summary      Patient Name: Lavelle Ladd  MRN: [id number]  Admission Date: 12/30/2019  Hospital Length of Stay: 3 days  Discharge Date and Time:  01/02/2020 2:16 PM  Attending Physician: No att. providers found   Discharging Provider: Carla Irby NP  Primary Care Provider: Rishabh Esteban MD      HPI:   64 y.o. male patient with a PMHx of PVD, PAD, HTN, hyperlipidemia, GERD, diastolic heart failure, CRI, CAD, COPD, CHF, and arthritis who presents to the ED with c/o SOB which onset gradually over the past several weeks. Associated symptoms include CP, edema, cough, and general malaise. Symptoms are constant and moderate in severity. No mitigating or exacerbating factors reported. Patient denies any recent travel, long car trips, fever, N/V/D, congestion, rhinorrhea, chills, sore throat, HA, dizziness, numbness, weakness, back pain, dysuria, hematuria, and all other sxs at this time. Prior Tx includes humidifiers and Albuterol inhalers at home with no relief.   He is a full code and his SDM is his sister,  Kecia.  He will be admitted to med/surg for bilat pneumonia under the care of hospital Medicine.     * No surgery found *      Hospital Course:   64 year old male admitted for bilateral PNA. Patient being treated with IV zosyn, duoneb tx, and supplemental oxygen. Blood culture remain negative. 1/1/20- WBCs remain normal, afebrile. Creatinine 1.9 up from 1.4. IV zosyn switched to IV cefepime. Patient c/o left arm pain US negative for DVT. 1/2/20- Symptoms improved. Renal function trending down. Blood cx remain negative. Case discussed with Dr. Proctor. Patient ready for discharge. Social work consult to arrange HH. Patient to follow-up with PCP in 3 days for hospital follow-up of PNA. Patient also to follow-up with cardiology, oncology, and vascular surgery outpatient. Home meds reconciled. New prescription given for Augmentin. Patient seen and  examined on the date of discharge and found suitable for discharge.      Consults:     No new Assessment & Plan notes have been filed under this hospital service since the last note was generated.  Service: Hospital Medicine    Final Active Diagnoses:    Diagnosis Date Noted POA    PRINCIPAL PROBLEM:  Pneumonia of both lungs due to infectious organism [J18.9] 12/30/2019 Yes    Squamous cell carcinoma, scalp/neck [C44.42] 09/27/2019 Yes    Hx of right BKA [Z89.511] 11/01/2018 Not Applicable    Type 2 diabetes mellitus with retinopathy, with long-term current use of insulin [E11.319, Z79.4] 12/01/2016 Not Applicable    Essential hypertension [I10] 02/20/2015 Yes      Problems Resolved During this Admission:       Discharged Condition: stable    Disposition: Home-Health Care [facility]    Follow Up:  Follow-up Information     [facility].    Specialty:  Home Health Services  Why:  Home Health  Contact information:  [address]  [address]  [place] [address]  [phone]             Rishabh Esteban MD In 3 days.    Specialty:  Family Medicine  Why:  for hospital follow-up   Contact information:  [address]  [address]  [place] [address]  [phone]             Chapin Rodríguez II, MD In 1 week.    Specialty:  Radiation Oncology  Why:  for hospital follow-up   Contact information:  [address] [place] [address]  [phone]             Michael Contreras Md, MD In 1 week.    Specialties:  Cardiology, Internal Medicine  Why:  for hospital follow-up   Contact information:  [address]  [place] [address]  [phone]                 Patient Instructions:      Notify your health care provider if you experience any of the following:  temperature >100.4     Notify your health care provider if you experience any of the following:  difficulty breathing or increased cough     Notify your health care provider if you experience any of the following:  increased  confusion or weakness     Notify your health care provider if you experience any of the following:  persistent dizziness, light-headedness, or visual disturbances     Activity as tolerated       Significant Diagnostic Studies: Labs:   BMP:   Recent Labs   Lab 01/01/20  0435 01/02/20  0511   * 177*    139   K 4.3 3.9    105   CO2 20* 24   BUN 17 16   CREATININE 1.9* 1.7*   CALCIUM 9.2 9.7   MG 2.0 1.9   , CMP   Recent Labs   Lab 01/01/20 0435 01/02/20  0511    139   K 4.3 3.9    105   CO2 20* 24   * 177*   BUN 17 16   CREATININE 1.9* 1.7*   CALCIUM 9.2 9.7   ANIONGAP 13 10   ESTGFRAFRICA 42* 48*   EGFRNONAA 36* 41*    and CBC   Recent Labs   Lab 01/01/20 0435 01/02/20  0511   WBC 3.90 2.78*   HGB 13.0* 12.4*   HCT 42.6 41.3    147*     Microbiology:   Blood Culture   Lab Results   Component Value Date    LABBLOO No Growth to date 12/30/2019    LABBLOO No Growth to date 12/30/2019    LABBLOO No Growth to date 12/30/2019       Pending Diagnostic Studies:     None         Medications:  Reconciled Home Medications:      Medication List      START taking these medications    amoxicillin-clavulanate 875-125mg 875-125 mg per tablet  Commonly known as:  AUGMENTIN  Take 1 tablet by mouth every 12 (twelve) hours. for 7 days        CONTINUE taking these medications    albuterol sulfate 2.5 mg/0.5 mL Nebu  Take 2.5 mg by nebulization every 4 (four) hours as needed. Rescue     amLODIPine 10 MG tablet  Commonly known as:  NORVASC  Take 1 tablet (10 mg total) by mouth once daily.     aspirin 81 MG EC tablet  Commonly known as:  ECOTRIN  Take 1 tablet (81 mg total) by mouth once daily.     atorvastatin 80 MG tablet  Commonly known as:  LIPITOR  Take 1 tablet (80 mg total) by mouth once daily.     BD Insulin Syringe Ultra-Fine 1 mL 31 gauge x 5/16 Syrg  Generic drug:  insulin syringe-needle U-100  USE ONE AS DIRECTED FOUR TIMES DAILY WITH MEALS AND AT BEDTIME     blood sugar diagnostic  Strp  1 each by Misc.(Non-Drug; Combo Route) route 3 (three) times daily.     blood-glucose meter kit  Use as instructed     clopidogrel 75 mg tablet  Commonly known as:  PLAVIX  Take 1 tablet (75 mg total) by mouth once daily.     diclofenac 1.3 % Pt12  Commonly known as:  FLECTOR  Apply 1 packet topically once daily.     ergocalciferol 50,000 unit Cap  Commonly known as:  ERGOCALCIFEROL  Take 1 capsule (50,000 Units total) by mouth every 7 days. Take on Mondays     flash glucose scanning reader Misc  Commonly known as:  FreeStyle Fabrice 14 Day [place]  1 Device by Misc.(Non-Drug; Combo Route) route as needed.     flash glucose sensor Kit  Commonly known as:  FreeStyle Fabrice 14 Day Sensor  1 kit by Misc.(Non-Drug; Combo Route) route every 14 (fourteen) days.     gabapentin 300 MG capsule  Commonly known as:  NEURONTIN  Take 1 capsule (300 mg total) by mouth 3 (three) times daily. for 10 days     HYDROcodone-acetaminophen  mg per tablet  Commonly known as:  NORCO  1 tablet by Per G Tube route every 6 (six) hours as needed for Pain.     isosorbide mononitrate 30 MG 24 hr tablet  Commonly known as:  IMDUR  Take 2 tablets (60 mg total) by mouth once daily.     liraglutide 0.6 mg/0.1 mL (18 mg/3 mL) subq PNIJ 0.6 mg/0.1 mL (18 mg/3 mL) Pnij  Commonly known as:  Victoza 2-Catracho  Inject 1.8 mg into the skin once daily.     metoprolol tartrate 25 MG tablet  Commonly known as:  LOPRESSOR  Take 1 tablet (25 mg total) by mouth 2 (two) times daily.     mupirocin 2 % ointment  Commonly known as:  BACTROBAN  Apply topically 3 (three) times daily.     mupirocin calcium 2% 2 % cream  Commonly known as:  Bactroban  Apply topically 3 (three) times daily.     naloxone 4 mg/actuation Spry  Commonly known as:  NARCAN  1 spray by Nasal route once.     nitroGLYCERIN 0.4 MG SL tablet  Commonly known as:  NITROSTAT  Place 1 tablet (0.4 mg total) under the tongue every 5 (five) minutes as needed.     ondansetron 4 MG Tbdl  Commonly known  "as:  ZOFRAN-ODT  Take 1 tablet (4 mg total) by mouth every 8 (eight) hours as needed.     * pen needle, diabetic 31 gauge x 3/16" Ndle  Commonly known as:  Sure-Fine Pen Needles  1 each by Misc.(Non-Drug; Combo Route) route 4 (four) times daily with meals and nightly.     * pen needle, diabetic 32 gauge x 5/32" Ndle  1 each by Misc.(Non-Drug; Combo Route) route 4 (four) times daily. Urszula Needles     tacrolimus 0.5 MG Cap  Commonly known as:  PROGRAF  Take 3 capsules (1.5 mg total) by mouth every 12 (twelve) hours.     Tresiba FlexTouch U-100 100 unit/mL (3 mL) Inpn  Generic drug:  insulin degludec  Inject 30 Units into the skin 2 (two) times daily.         * This list has 2 medication(s) that are the same as other medications prescribed for you. Read the directions carefully, and ask your doctor or other care provider to review them with you.            STOP taking these medications    azithromycin 250 MG tablet  Commonly known as:  Z-KOKI        ASK your doctor about these medications    predniSONE 5 MG tablet  Commonly known as:  DELTASONE  Take 1 tablet (5 mg total) by mouth once daily.            Indwelling Lines/Drains at time of discharge:   Lines/Drains/Airways     None                 Time spent on the discharge of patient: 55 minutes  Patient was seen and examined on the date of discharge and determined to be suitable for discharge.         Carla Irby NP  Department of Hospital Medicine  [facility]  "

## 2020-01-02 NOTE — NURSING
Assumed care of pt from MAYKEL Ludwig. [facility] myself and prior nurse to assess his skin. Currently on RA, O2 sats 94%. AISHWARYA.

## 2020-01-02 NOTE — PROGRESS NOTES
Home Oxygen Evaluation    Date Performed: 2020    1) Patient's Home O2 Sat on room air, while at rest: 96%        If O2 sats on room air at rest are 88% or below, patient qualifies. No additional testing needed. Document N/A in steps 2 and 3. If 89% or above, complete steps 2.      2) Patient's O2 Sat on room air while exercisin%, but short of breath while ambulating         If O2 sats on room air while exercising remain 89% or above patient does not qualify, no further testing needed Document N/A in step 3. If O2 sats on room air while exercising are 88% or below, continue to step 3.      3) Patient's O2 Sat while exercising on O2: NA at NA LPM         (Must show improvement from #2 for patients to qualify)    If O2 sats improve on oxygen, patient qualifies for portable oxygen. If not, the patient does not qualify.

## 2020-01-02 NOTE — PLAN OF CARE
Fall precautions maintained. Pt free from falls/injuries.  Patient denies any complaints of pain at this time.  Antibiotics given as prescribed.  Ambulates and repositions with assistance.  Accucheck done, coverage given as needed.  Plan of care and medications discussed with patient.  Patient verbalized understanding.  Bed locked and low, call bell within reach.  Chart check done. Will continue to monitor.

## 2020-01-02 NOTE — PLAN OF CARE
No new issues. IV abx infused as ordered. Afebrile. Unable to produce sputum sample this shift. Pt refused skin assessment this shift.Turning independently. Pain controlled. Drsng to head CDI. BP stable. Chart reviewed. Will monitor.

## 2020-01-02 NOTE — PLAN OF CARE
Jan8 Hospital Follow Up with Colin Garcia MD   Wednesday Jan 8, 2020 11:20 AM   Arrive at check-in approximately 15 minutes before your scheduled appointment time. Bring all outside medical records and imaging, along with a list of your current medications and insurance card.  3rd Floor - From [address], take the [facility] exit ([address] Enter the facility from the [address]  [facility]   [address]  [place] [address]   [phone]         01/02/20 1228   Final Note   Assessment Type Final Discharge Note   Anticipated Discharge Disposition [facility]   Hospital Follow Up  Appt(s) scheduled? Yes   Right Care Referral Info   Post Acute Recommendation Home-care   Facility Name [facility]

## 2020-01-02 NOTE — NURSING
. Pt refusing blood stick for BG. Pt has BG sensor and glucometer at bedside. Notified VERO Daley.

## 2020-01-03 ENCOUNTER — TELEPHONE (OUTPATIENT)
Dept: OTOLARYNGOLOGY | Facility: CLINIC | Age: 65
End: 2020-01-03

## 2020-01-03 NOTE — TELEPHONE ENCOUNTER
----- Message from Helder Rendon sent at 1/3/2020 11:08 AM CST -----  Contact: Monique person/ Home Health   Caller called in regards to the pt getting back home from hospital and had question s about the pt albuterol medication. Caller can be reached at [phone].

## 2020-01-03 NOTE — TELEPHONE ENCOUNTER
"Advised Monique to contact PCP or pulmonary provider, to inquire about alternative to albuterol d/t intolerance of "jitty" feeling. Verbalized understanding.   "

## 2020-01-03 NOTE — TELEPHONE ENCOUNTER
Pt states he was just released from the hospital today after being admitted for 4 days with pneumonia, he states he is now having trouble breathing again, he has to take breaths between words, sounds short of breath, advised him to go to ER for provider assessment, caller agreed     Reason for Disposition   Patient sounds very sick or weak to the triager    Protocols used: BREATHING DIFFICULTY-A-AH

## 2020-01-04 ENCOUNTER — HOSPITAL ENCOUNTER (EMERGENCY)
Facility: HOSPITAL | Age: 65
Discharge: HOME | End: 2020-01-04
Attending: INTERNAL MEDICINE
Payer: MEDICARE

## 2020-01-04 ENCOUNTER — HOSPITAL ENCOUNTER (EMERGENCY)
Facility: HOSPITAL | Age: 65
Discharge: HOME | End: 2020-01-04
Attending: FAMILY MEDICINE
Payer: MEDICARE

## 2020-01-04 VITALS
DIASTOLIC BLOOD PRESSURE: 70 MMHG | BODY MASS INDEX: 31.46 KG/M2 | HEART RATE: 82 BPM | WEIGHT: 224.75 LBS | SYSTOLIC BLOOD PRESSURE: 160 MMHG | HEIGHT: 71 IN | OXYGEN SATURATION: 94 % | RESPIRATION RATE: 18 BRPM | TEMPERATURE: 98 F

## 2020-01-04 VITALS
OXYGEN SATURATION: 98 % | WEIGHT: 225 LBS | DIASTOLIC BLOOD PRESSURE: 77 MMHG | RESPIRATION RATE: 20 BRPM | TEMPERATURE: 98 F | HEART RATE: 82 BPM | SYSTOLIC BLOOD PRESSURE: 138 MMHG | BODY MASS INDEX: 31.38 KG/M2

## 2020-01-04 DIAGNOSIS — G47.33 OSA (OBSTRUCTIVE SLEEP APNEA): Primary | ICD-10-CM

## 2020-01-04 DIAGNOSIS — Z94.0 KIDNEY TRANSPLANT RECIPIENT: ICD-10-CM

## 2020-01-04 DIAGNOSIS — R06.09 CHRONIC DYSPNEA: Primary | ICD-10-CM

## 2020-01-04 DIAGNOSIS — N18.6 ESRD (END STAGE RENAL DISEASE): ICD-10-CM

## 2020-01-04 DIAGNOSIS — N18.30 STAGE 3 CHRONIC KIDNEY DISEASE: ICD-10-CM

## 2020-01-04 DIAGNOSIS — R06.09 DOE (DYSPNEA ON EXERTION): ICD-10-CM

## 2020-01-04 DIAGNOSIS — Z79.899 LONG TERM CURRENT USE OF IMMUNOSUPPRESSIVE DRUG: ICD-10-CM

## 2020-01-04 DIAGNOSIS — Z94.0 HISTORY OF KIDNEY TRANSPLANT: ICD-10-CM

## 2020-01-04 DIAGNOSIS — R06.02 SHORTNESS OF BREATH: ICD-10-CM

## 2020-01-04 DIAGNOSIS — F41.9 ANXIETY: ICD-10-CM

## 2020-01-04 DIAGNOSIS — Z87.09 HISTORY OF COPD: ICD-10-CM

## 2020-01-04 DIAGNOSIS — Z89.511 HX OF RIGHT BKA: ICD-10-CM

## 2020-01-04 LAB
ALBUMIN SERPL BCP-MCNC: 3.8 G/DL (ref 3.5–5.2)
ALLENS TEST: ABNORMAL
ALP SERPL-CCNC: 81 U/L (ref 55–135)
ALT SERPL W/O P-5'-P-CCNC: 46 U/L (ref 10–44)
ANION GAP SERPL CALC-SCNC: 12 MMOL/L (ref 8–16)
AST SERPL-CCNC: 51 U/L (ref 10–40)
BASOPHILS # BLD AUTO: 0.02 K/UL (ref 0–0.2)
BASOPHILS NFR BLD: 0.3 % (ref 0–1.9)
BILIRUB SERPL-MCNC: 0.5 MG/DL (ref 0.1–1)
BILIRUB UR QL STRIP: NEGATIVE
BNP SERPL-MCNC: 204 PG/ML (ref 0–99)
BUN SERPL-MCNC: 20 MG/DL (ref 8–23)
CALCIUM SERPL-MCNC: 10 MG/DL (ref 8.7–10.5)
CHLORIDE SERPL-SCNC: 101 MMOL/L (ref 95–110)
CLARITY UR: CLEAR
CO2 SERPL-SCNC: 24 MMOL/L (ref 23–29)
COLOR UR: YELLOW
CREAT SERPL-MCNC: 1.6 MG/DL (ref 0.5–1.4)
DELSYS: ABNORMAL
DIFFERENTIAL METHOD: ABNORMAL
EOSINOPHIL # BLD AUTO: 0.2 K/UL (ref 0–0.5)
EOSINOPHIL NFR BLD: 2.7 % (ref 0–8)
ERYTHROCYTE [DISTWIDTH] IN BLOOD BY AUTOMATED COUNT: 13.9 % (ref 11.5–14.5)
EST. GFR  (AFRICAN AMERICAN): 51 ML/MIN/1.73 M^2
EST. GFR  (NON AFRICAN AMERICAN): 44 ML/MIN/1.73 M^2
FIO2: 21
GLUCOSE SERPL-MCNC: 96 MG/DL (ref 70–110)
GLUCOSE UR QL STRIP: NEGATIVE
HCO3 UR-SCNC: 23.2 MMOL/L (ref 24–28)
HCT VFR BLD AUTO: 44.3 % (ref 40–54)
HGB BLD-MCNC: 13.8 G/DL (ref 14–18)
HGB UR QL STRIP: NEGATIVE
IMM GRANULOCYTES # BLD AUTO: 0.01 K/UL (ref 0–0.04)
IMM GRANULOCYTES NFR BLD AUTO: 0.2 % (ref 0–0.5)
KETONES UR QL STRIP: NEGATIVE
LACTATE SERPL-SCNC: 1 MMOL/L (ref 0.5–2.2)
LEUKOCYTE ESTERASE UR QL STRIP: NEGATIVE
LYMPHOCYTES # BLD AUTO: 2.4 K/UL (ref 1–4.8)
LYMPHOCYTES NFR BLD: 39.8 % (ref 18–48)
MCH RBC QN AUTO: 28.6 PG (ref 27–31)
MCHC RBC AUTO-ENTMCNC: 31.2 G/DL (ref 32–36)
MCV RBC AUTO: 92 FL (ref 82–98)
MODE: ABNORMAL
MONOCYTES # BLD AUTO: 0.9 K/UL (ref 0.3–1)
MONOCYTES NFR BLD: 14.3 % (ref 4–15)
NEUTROPHILS # BLD AUTO: 2.5 K/UL (ref 1.8–7.7)
NEUTROPHILS NFR BLD: 42.7 % (ref 38–73)
NITRITE UR QL STRIP: NEGATIVE
NRBC BLD-RTO: 0 /100 WBC
PCO2 BLDA: 40.5 MMHG (ref 35–45)
PH SMN: 7.37 [PH] (ref 7.35–7.45)
PH UR STRIP: 7 [PH] (ref 5–8)
PLATELET # BLD AUTO: 167 K/UL (ref 150–350)
PMV BLD AUTO: 9.9 FL (ref 9.2–12.9)
PO2 BLDA: 59 MMHG (ref 80–100)
POC BE: -2 MMOL/L
POC SATURATED O2: 89 % (ref 95–100)
POTASSIUM SERPL-SCNC: 4.3 MMOL/L (ref 3.5–5.1)
PROCALCITONIN SERPL IA-MCNC: 0.03 NG/ML
PROT SERPL-MCNC: 7.9 G/DL (ref 6–8.4)
PROT UR QL STRIP: ABNORMAL
RBC # BLD AUTO: 4.82 M/UL (ref 4.6–6.2)
SAMPLE: ABNORMAL
SITE: ABNORMAL
SODIUM SERPL-SCNC: 137 MMOL/L (ref 136–145)
SP GR UR STRIP: <=1.005 (ref 1–1.03)
TROPONIN I SERPL DL<=0.01 NG/ML-MCNC: 0.01 NG/ML (ref 0–0.03)
TROPONIN I SERPL DL<=0.01 NG/ML-MCNC: 0.03 NG/ML (ref 0–0.03)
URN SPEC COLLECT METH UR: ABNORMAL
UROBILINOGEN UR STRIP-ACNC: NEGATIVE EU/DL
WBC # BLD AUTO: 5.93 K/UL (ref 3.9–12.7)

## 2020-01-04 PROCEDURE — 36600 WITHDRAWAL OF ARTERIAL BLOOD: CPT | Mod: HCNC

## 2020-01-04 PROCEDURE — 80053 COMPREHEN METABOLIC PANEL: CPT | Mod: HCNC

## 2020-01-04 PROCEDURE — 96374 THER/PROPH/DIAG INJ IV PUSH: CPT | Mod: HCNC

## 2020-01-04 PROCEDURE — 82803 BLOOD GASES ANY COMBINATION: CPT | Mod: HCNC

## 2020-01-04 PROCEDURE — 94640 AIRWAY INHALATION TREATMENT: CPT | Mod: HCNC

## 2020-01-04 PROCEDURE — 81003 URINALYSIS AUTO W/O SCOPE: CPT | Mod: HCNC

## 2020-01-04 PROCEDURE — 99283 EMERGENCY DEPT VISIT LOW MDM: CPT | Mod: 25,27,HCNC

## 2020-01-04 PROCEDURE — 83880 ASSAY OF NATRIURETIC PEPTIDE: CPT | Mod: HCNC

## 2020-01-04 PROCEDURE — 93005 ELECTROCARDIOGRAM TRACING: CPT | Mod: HCNC

## 2020-01-04 PROCEDURE — 99285 EMERGENCY DEPT VISIT HI MDM: CPT | Mod: 25,HCNC

## 2020-01-04 PROCEDURE — 84484 ASSAY OF TROPONIN QUANT: CPT | Mod: HCNC

## 2020-01-04 PROCEDURE — 93010 EKG 12-LEAD: ICD-10-PCS | Mod: HCNC,,, | Performed by: INTERNAL MEDICINE

## 2020-01-04 PROCEDURE — 93010 ELECTROCARDIOGRAM REPORT: CPT | Mod: HCNC,,, | Performed by: INTERNAL MEDICINE

## 2020-01-04 PROCEDURE — 83605 ASSAY OF LACTIC ACID: CPT | Mod: HCNC

## 2020-01-04 PROCEDURE — 63600175 PHARM REV CODE 636 W HCPCS: Mod: HCNC | Performed by: FAMILY MEDICINE

## 2020-01-04 PROCEDURE — 87040 BLOOD CULTURE FOR BACTERIA: CPT | Mod: 59,HCNC

## 2020-01-04 PROCEDURE — 25000242 PHARM REV CODE 250 ALT 637 W/ HCPCS: Mod: HCNC | Performed by: FAMILY MEDICINE

## 2020-01-04 PROCEDURE — 99900035 HC TECH TIME PER 15 MIN (STAT): Mod: HCNC

## 2020-01-04 PROCEDURE — 85025 COMPLETE CBC W/AUTO DIFF WBC: CPT | Mod: HCNC

## 2020-01-04 PROCEDURE — 84145 PROCALCITONIN (PCT): CPT | Mod: HCNC

## 2020-01-04 RX ORDER — LEVALBUTEROL INHALATION SOLUTION 0.63 MG/3ML
1 SOLUTION RESPIRATORY (INHALATION) EVERY 4 HOURS PRN
Qty: 1 BOX | Refills: 1 | Status: SHIPPED | OUTPATIENT
Start: 2020-01-04 | End: 2020-01-07 | Stop reason: ALTCHOICE

## 2020-01-04 RX ORDER — HYDROXYZINE PAMOATE 25 MG/1
25 CAPSULE ORAL EVERY 8 HOURS PRN
Qty: 20 CAPSULE | Refills: 0 | Status: SHIPPED | OUTPATIENT
Start: 2020-01-04 | End: 2022-01-01

## 2020-01-04 RX ORDER — METHYLPHENIDATE HYDROCHLORIDE 10 MG/1
10 TABLET ORAL 2 TIMES DAILY WITH MEALS
Qty: 30 TABLET | Refills: 0 | Status: SHIPPED | OUTPATIENT
Start: 2020-01-04 | End: 2020-04-15 | Stop reason: SDUPTHER

## 2020-01-04 RX ORDER — LEVALBUTEROL INHALATION SOLUTION 0.63 MG/3ML
1.25 SOLUTION RESPIRATORY (INHALATION)
Status: COMPLETED | OUTPATIENT
Start: 2020-01-04 | End: 2020-01-04

## 2020-01-04 RX ORDER — ONDANSETRON 2 MG/ML
4 INJECTION INTRAMUSCULAR; INTRAVENOUS
Status: COMPLETED | OUTPATIENT
Start: 2020-01-04 | End: 2020-01-04

## 2020-01-04 RX ORDER — METHYLPHENIDATE HYDROCHLORIDE 10 MG/1
10 TABLET ORAL 2 TIMES DAILY WITH MEALS
Qty: 30 TABLET | Refills: 0 | Status: SHIPPED | OUTPATIENT
Start: 2020-01-04 | End: 2020-01-04 | Stop reason: SDUPTHER

## 2020-01-04 RX ADMIN — ONDANSETRON 4 MG: 2 INJECTION INTRAMUSCULAR; INTRAVENOUS at 05:01

## 2020-01-04 RX ADMIN — LEVALBUTEROL 0.63 MG: 0.63 SOLUTION RESPIRATORY (INHALATION) at 04:01

## 2020-01-04 NOTE — ED NOTES
MD Nunez notified of pt. Being roomed and that pt. SOB with labored respirations. Also notified that pt. On 2L O2 via NC - placed by RN for RA O2 sat of 90%.

## 2020-01-04 NOTE — DISCHARGE INSTRUCTIONS
Need outpatient sleep study for evaluation and management of severe sleep apnea.    Elevate head end 45 degrees with blocks under the head end of the bed        No food after 7 pm     No caffeine or tea or smoking at night

## 2020-01-04 NOTE — ED NOTES
Report received from MAYKEL Ricardo. Introduced self to pt and updated whiteboard.    Patient identifiers verified and correct for Lavelle Ladd.    LOC: The patient is awake, alert and aware of environment with an appropriate affect, the patient is oriented x 3 and speaking appropriately.  APPEARANCE: Patient resting comfortably and in no acute distress, patient is clean and well groomed, patient's clothing is properly fastened.  SKIN: The skin is warm and dry, color consistent with ethnicity, patient has normal skin turgor and moist mucus membranes, skin intact EX: generalized bruising noted to extremities  MUSCULOSKELETAL: Patient moving all extremities spontaneously.  RESPIRATORY: Airway is open and patent, respirations are spontaneous.  CARDIAC: Patient has a normal rate, no peripheral edema noted, capillary refill < 3 seconds.  ABDOMEN: Soft and non tender to palpation.

## 2020-01-04 NOTE — ED PROVIDER NOTES
"SCRIBE #1 NOTE: I, Alisson Edwards, am scribing for, and in the presence of, Avel Estrada MD. I have scribed the entire note.       History     Chief Complaint   Patient presents with    General Illness     Pt states, "Everytime I fall alseep, I cant breath." Pt d/c from hospital 2 hours ago.      Review of patient's allergies indicates:  No Known Allergies      History of Present Illness     HPI    2020, 12:36 PM  History obtained from the patient      History of Present Illness: Lavelle Ladd is a 64 y.o. male patient with a PMHx of DM, PVD, renal HTN, PAD, GERD, diastolic heart failure, CRI, CAD, CHF, DINORAH, COPD, and arthritis who presents to the Emergency Department for evaluation of SOB with sleeping which onset gradually over the last 2 hours. Pt was evaluated at the ED 2 hours ago and discharged with Levalbuterol nebulizer solution and vistaril 25 mg. Pt reports that when he tried to fall asleep, he suddenly cannot breathe causing anxiety with sleep. Pt was referred to pulmonology earlier today. Symptoms are episodic and moderate in severity. Sleep increases SOB. No associated sxs reported. Patient denies any fever/ chills, cough, CP, palpations, numbness, HA, dizziness, rash, wound, abdominal pain, n/v/d, back/ neck pain, sore throat, congestion, dysuria, hematuria, localized weakness, easily bruising, BLE edema, and all other sxs at this time. No further complaints or concerns at this time.     Arrival mode: Personal vehicle      PCP: Rishabh Esteban MD        Past Medical History:  Past Medical History:   Diagnosis Date    DINORAH (acute kidney injury) 2016    Arthritis     CAD in native artery 2019    CHF (congestive heart failure)     Chronic obstructive pulmonary disease 2016    Coronary artery disease involving native coronary artery of native heart without angina pectoris 2016    CRI (chronic renal insufficiency) 2019     donor kidney transplant for DM " 5/21/16     Induction with Thymo x3 and IV solumedrol to total 875mg  Kidney Biopsy  5/31/2016: 16 glomeruli, ACR type 1 AVR type 2, significant microcirculatory changes, c4d negative, No DSA, 5 to10% fibrosis. Treated with thymo x8 6/21/2016- no rejection      Diastolic heart failure     Encounter for blood transfusion     ESRD on RRT since 10/2013 10/29/2013    Biopsy proven diabetic nephropathy and lymphoplasmacytic interstitial infiltrate not c/w with AIN (ddx sjogrens or assoc with tamm-horsefall protein extravasation)     GERD (gastroesophageal reflux disease)     History of hepatitis C, s/p successful Rx w/ SVR12 - 4/2017 4/5/2017    Completed 12 weeks harvoni w/ SVR    Hyperlipidemia     Hypertension     PAD (peripheral artery disease) 7/21/2019    PIC line (peripherally inserted central catheter) flush     Prophylactic immunotherapy     Proteinuria     PVD (peripheral vascular disease) 6/26/2017    RLE BKA CT 12/11/16 Extensive atherosclerotic disease of the aorta and mesenteric arteries.     Renal hypertension     Type 2 diabetes mellitus with diabetic neuropathy, with long-term current use of insulin 12/1/2016    Vitamin B12 deficiency        Past Surgical History:  Past Surgical History:   Procedure Laterality Date    AORTOGRAPHY WITH SERIALOGRAPHY N/A 6/14/2018    Procedure: LEFT LEG ANGIOGRAM;  Surgeon: Donal Mcdonald MD;  Location: [facility] CATH LAB;  Service: Vascular;  Laterality: N/A;    av bovine graft      Left UE    AV FISTULA PLACEMENT      left UE    CARDIAC CATHETERIZATION  02/2015    CLOSURE OF WOUND Left 9/24/2018    Procedure: CLOSURE, WOUND;  Surgeon: Karla Wheeler DPM;  Location: [facility] OR;  Service: Podiatry;  Laterality: Left;  Secondary Wound closure, extensive    CLOSURE OF WOUND Left 11/5/2018    Procedure: CLOSURE, WOUND;  Surgeon: Karla Wheeler DPM;  Location: [facility] OR;  Service: Podiatry;  Laterality: Left;    DEBRIDEMENT OF MULTIPLE METATARSAL BONES Left  11/5/2018    Procedure: DEBRIDEMENT, METATARSAL BONE, 2 OR MORE BONES;  Surgeon: Karla Wheeler DPM;  Location: [facility] OR;  Service: Podiatry;  Laterality: Left;    EXCISION OF SKIN Left 9/27/2019    Procedure: EXCISION, SKIN;  Surgeon: Lenard Alarcon MD;  Location: [facility] OR 2ND FLR;  Service: Plastics;  Laterality: Left;  Plastics set, NIMS monitor, [employer]Cell    FOOT AMPUTATION THROUGH METATARSAL Left 9/21/2018    Procedure: AMPUTATION, FOOT, TRANSMETATARSAL;  Surgeon: Karla Wheeler DPM;  Location: [facility] OR;  Service: Podiatry;  Laterality: Left;    FOOT AMPUTATION THROUGH METATARSAL Left 10/31/2018    Procedure: AMPUTATION, FOOT, TRANSMETATARSAL;  Surgeon: Karla Wheeler DPM;  Location: [facility] OR;  Service: Podiatry;  Laterality: Left;    FOOT AMPUTATION THROUGH METATARSAL Left 11/5/2018    Procedure: AMPUTATION, FOOT, TRANSMETATARSAL;  Surgeon: Karla Wheeler DPM;  Location: [facility] OR;  Service: Podiatry;  Laterality: Left;  revisional transmetatarsal amputation, Left foot    IRRIGATION AND DEBRIDEMENT OF LOWER EXTREMITY Left 10/31/2018    Procedure: IRRIGATION AND DEBRIDEMENT, LOWER EXTREMITY;  Surgeon: Karla Wheeler DPM;  Location: [facility] OR;  Service: Podiatry;  Laterality: Left;    KIDNEY TRANSPLANT  05/21/2016    LEFT HEART CATHETERIZATION Left 7/21/2019    Procedure: CATHETERIZATION, HEART, LEFT;  Surgeon: Andrew Valdez MD;  Location: [facility] CATH LAB;  Service: Cardiology;  Laterality: Left;    LEG AMPUTATION THROUGH KNEE  2011    right LE, started as nail puncture leading to diabetic ulcer    SKIN FULL THICKNESS GRAFT Left 10/7/2019    Procedure: APPLICATION, GRAFT, SKIN, FULL-THICKNESS;  Surgeon: Lenard Alarcon MD;  Location: [facility] OR 2ND FLR;  Service: Plastics;  Laterality: Left;    SURGICAL REMOVAL OF LESION OF FACE Right 10/7/2019    Procedure: EXCISION, LESION, FACE;  Surgeon: Lenard Alarcon MD;  Location: [facility] OR 2ND FLR;  Service: Plastics;  Laterality: Right;          Family History:  Family History   Problem Relation Age of Onset    Cancer Father     Diabetes Father     Heart failure Father     Stroke Father     Heart failure Mother     Kidney disease Neg Hx        Social History:  Social History     Tobacco Use    Smoking status: Former Smoker     Packs/day: 1.00     Years: 40.00     Pack years: 40.00     Last attempt to quit: 2013     Years since quittin.9    Smokeless tobacco: Never Used   Substance and Sexual Activity    Alcohol use: Yes     Alcohol/week: 6.0 standard drinks     Types: 6 Cans of beer per week     Comment: seldom    Drug use: No    Sexual activity: Never        Review of Systems     Review of Systems   Constitutional: Negative for chills and fever.   HENT: Negative for congestion and sore throat.    Respiratory: Positive for shortness of breath (with sleeping). Negative for cough.    Cardiovascular: Negative for chest pain, palpitations and leg swelling.   Gastrointestinal: Negative for abdominal pain, diarrhea, nausea and vomiting.   Genitourinary: Negative for dysuria and hematuria.   Musculoskeletal: Negative for back pain and neck pain.   Skin: Negative for rash and wound.   Neurological: Negative for dizziness, weakness, numbness and headaches.   Hematological: Does not bruise/bleed easily.   All other systems reviewed and are negative.     Physical Exam     Initial Vitals [20 1159]   BP Pulse Resp Temp SpO2   (!) 142/81 84 18 97.7 °F (36.5 °C) 97 %      MAP       --          Physical Exam  Nursing Notes and Vital Signs Reviewed.  Constitutional: Patient is in no acute distress. Well-developed and well-nourished.  Head: Atraumatic. Normocephalic.  Eyes: PERRL. EOM intact. Conjunctivae are not pale. No scleral icterus.  ENT: Mucous membranes are moist. Oropharynx is clear and symmetric.    Neck: Supple. Full ROM. No lymphadenopathy.  Cardiovascular: Regular rate. Regular rhythm. No murmurs, rubs, or gallops. Distal pulses are  2+ and symmetric.  Pulmonary/Chest: No respiratory distress. Clear to auscultation bilaterally. No wheezing or rales.  Abdominal: Soft and non-distended.  There is no tenderness.  No rebound, guarding, or rigidity. Good bowel sounds.  Genitourinary: No CVA tenderness  Musculoskeletal: Moves all extremities. No obvious deformities. No edema. No calf tenderness. RLE BKA.   Skin: Warm and dry.  Neurological:  Alert, awake, and appropriate.  Normal speech.  No acute focal neurological deficits are appreciated.  Psychiatric: Normal affect. Good eye contact. Appropriate in content.     ED Course   Procedures  ED Vital Signs:  Vitals:    01/04/20 1159 01/04/20 1316   BP: (!) 142/81 138/77   Pulse: 84 82   Resp: 18 20   Temp: 97.7 °F (36.5 °C) 97.9 °F (36.6 °C)   TempSrc: Oral Oral   SpO2: 97% 98%   Weight:  102.1 kg (225 lb)       Abnormal Lab Results:  Labs Reviewed - No data to display     All Lab Results:  Results for orders placed or performed during the hospital encounter of 01/04/20   Blood culture x two cultures. Draw prior to antibiotics.   Result Value Ref Range    Blood Culture, Routine No Growth to date    Blood culture x two cultures. Draw prior to antibiotics.   Result Value Ref Range    Blood Culture, Routine No Growth to date    CBC auto differential   Result Value Ref Range    WBC 5.93 3.90 - 12.70 K/uL    RBC 4.82 4.60 - 6.20 M/uL    Hemoglobin 13.8 (L) 14.0 - 18.0 g/dL    Hematocrit 44.3 40.0 - 54.0 %    Mean Corpuscular Volume 92 82 - 98 fL    Mean Corpuscular Hemoglobin 28.6 27.0 - 31.0 pg    Mean Corpuscular Hemoglobin Conc 31.2 (L) 32.0 - 36.0 g/dL    RDW 13.9 11.5 - 14.5 %    Platelets 167 150 - 350 K/uL    MPV 9.9 9.2 - 12.9 fL    Immature Granulocytes 0.2 0.0 - 0.5 %    Gran # (ANC) 2.5 1.8 - 7.7 K/uL    Immature Grans (Abs) 0.01 0.00 - 0.04 K/uL    Lymph # 2.4 1.0 - 4.8 K/uL    Mono # 0.9 0.3 - 1.0 K/uL    Eos # 0.2 0.0 - 0.5 K/uL    Baso # 0.02 0.00 - 0.20 K/uL    nRBC 0 0 /100 WBC    Gran% 42.7  38.0 - 73.0 %    Lymph% 39.8 18.0 - 48.0 %    Mono% 14.3 4.0 - 15.0 %    Eosinophil% 2.7 0.0 - 8.0 %    Basophil% 0.3 0.0 - 1.9 %    Differential Method Automated    Comprehensive metabolic panel   Result Value Ref Range    Sodium 137 136 - 145 mmol/L    Potassium 4.3 3.5 - 5.1 mmol/L    Chloride 101 95 - 110 mmol/L    CO2 24 23 - 29 mmol/L    Glucose 96 70 - 110 mg/dL    BUN, Bld 20 8 - 23 mg/dL    Creatinine 1.6 (H) 0.5 - 1.4 mg/dL    Calcium 10.0 8.7 - 10.5 mg/dL    Total Protein 7.9 6.0 - 8.4 g/dL    Albumin 3.8 3.5 - 5.2 g/dL    Total Bilirubin 0.5 0.1 - 1.0 mg/dL    Alkaline Phosphatase 81 55 - 135 U/L    AST 51 (H) 10 - 40 U/L    ALT 46 (H) 10 - 44 U/L    Anion Gap 12 8 - 16 mmol/L    eGFR if African American 51 (A) >60 mL/min/1.73 m^2    eGFR if non African American 44 (A) >60 mL/min/1.73 m^2   B-Type natriuretic peptide (BNP)   Result Value Ref Range     (H) 0 - 99 pg/mL   Troponin I   Result Value Ref Range    Troponin I 0.026 0.000 - 0.026 ng/mL   Lactic acid, plasma #1   Result Value Ref Range    Lactate (Lactic Acid) 1.0 0.5 - 2.2 mmol/L   Urinalysis, Reflex to Urine Culture Urine, Clean Catch   Result Value Ref Range    Specimen UA Urine, Clean Catch     Color, UA Yellow Yellow, Straw, Jessa    Appearance, UA Clear Clear    pH, UA 7.0 5.0 - 8.0    Specific Gravity, UA <=1.005 (A) 1.005 - 1.030    Protein, UA Trace (A) Negative    Glucose, UA Negative Negative    Ketones, UA Negative Negative    Bilirubin (UA) Negative Negative    Occult Blood UA Negative Negative    Nitrite, UA Negative Negative    Urobilinogen, UA Negative <2.0 EU/dL    Leukocytes, UA Negative Negative   Procalcitonin   Result Value Ref Range    Procalcitonin 0.03 <0.25 ng/mL   Troponin I   Result Value Ref Range    Troponin I 0.009 0.000 - 0.026 ng/mL   ISTAT PROCEDURE   Result Value Ref Range    POC PH 7.367 7.35 - 7.45    POC PCO2 40.5 35 - 45 mmHg    POC PO2 59 (LL) 80 - 100 mmHg    POC HCO3 23.2 (L) 24 - 28 mmol/L    POC  BE -2 -2 to 2 mmol/L    POC SATURATED O2 89 (L) 95 - 100 %    Sample ARTERIAL     Site RR     Allens Test Pass     DelSys Room Air     Mode SPONT     FiO2 21      *Note: Due to a large number of results and/or encounters for the requested time period, some results have not been displayed. A complete set of results can be found in Results Review.         Imaging Results:  Imaging Results    None        Imaging Results                   X-Ray Chest AP Portable (Final result)  Result time 01/04/20 07:22:09                Final result by Charbel Laguerre MD (01/04/20 07:22:09)                               Impression:        Negative chest x-ray.        Electronically signed by:     Charbel Laguerre MD  Date:                                            01/04/2020  Time:                                            07:22                         Narrative:     EXAMINATION:  XR CHEST AP PORTABLE     CLINICAL HISTORY:  Sepsis;     FINDINGS:  Comparison study 12/30/2019.  Normal size heart.  Aortic arch calcification no congestion.  Lungs are clear.                                            The EKG was ordered, reviewed, and independently interpreted by the ED provider.  Interpretation time: 4:25  Rate: 84 BPM  Rhythm: sinus rhythm with 1st degree AV block  Interpretation: Left axis deviation. No STEMI.    The EKG was ordered, reviewed, and independently interpreted by the ED provider.  Interpretation time: 12:16  Rate: 79 BPM  Rhythm: SR with marked sinus arrhythmia with 1st degree AV block  Interpretation: otherwise normal ECG. No acute ST/T wave changes. No STEMI.           The Emergency Provider reviewed the vital signs and test results, which are outlined above.     ED Discussion     12:43 PM: Reassessed pt at this time. Pt is referred to pulmonology with an appointment on 1/8/20. Pt will have a sleep study scheduled at that time. Will prescribe Ritalin to treat MARIA INES until appointment. Discussed with pt all pertinent ED  information. Discussed pt dx and plan of tx. Gave pt all f/u and return to the ED instructions. All questions and concerns were addressed at this time. Pt expresses understanding of information and instructions, and is comfortable with plan to discharge. Pt is stable for discharge.    I discussed with patient and/or family/caretaker that evaluation in the ED does not suggest any emergent or life threatening medical conditions requiring immediate intervention beyond what was provided in the ED, and I believe patient is safe for discharge.  Regardless, an unremarkable evaluation in the ED does not preclude the development or presence of a serious of life threatening condition. As such, patient was instructed to return immediately for any worsening or change in current symptoms.         Medical Decision Making:   Clinical Tests:   Lab Tests: Reviewed  Radiological Study: Reviewed  Medical Tests: Reviewed and Ordered           ED Medication(s):  Medications - No data to display    Discharge Medication List as of 1/4/2020 12:40 PM                  Scribe Attestation:   Scribe #1: I performed the above scribed service and the documentation accurately describes the services I performed. I attest to the accuracy of the note.     Attending:   Physician Attestation Statement for Scribe #1: I, Avel Estrada MD, personally performed the services described in this documentation, as scribed by Alisson Edwards, in my presence, and it is both accurate and complete.           Clinical Impression       ICD-10-CM ICD-9-CM   1. MARIA INES (obstructive sleep apnea) G47.33 327.23   2. CAMARA (dyspnea on exertion) R06.09 786.09   3. Stage 3 chronic kidney disease N18.3 585.3   4. Hx of right BKA Z89.511 V49.75   5. Long term current use of immunosuppressive drug Z79.899 V58.69   6. Kidney transplant recipient Z94.0 V42.0       Disposition:   Disposition: Discharged  Condition: Stable         Avel Estrada MD  01/05/20 0721

## 2020-01-04 NOTE — ED PROVIDER NOTES
SCRIBE #1 NOTE: ILazaro, am scribing for, and in the presence of, Kavita Nunez MD. I have scribed the HPI, ROS, and PEx.     SCRIBE #2 NOTE: ILavelle, am scribing for, and in the presence of,  Charito Oleary MD. I have scribed the remaining portions of the note not scribed by Scribe #1.      History     Chief Complaint   Patient presents with    Shortness of Breath     Recently admitted to this hospital for pneumonia. DC two days ago. Tachypnic in triage. Respirations moderately labored.     Review of patient's allergies indicates:  No Known Allergies      History of Present Illness     HPI    1/4/2020, 4:18 AM  History obtained from the patient      History of Present Illness: Lavelle Ladd is a 64 y.o. male patient with a PMHx of DM2, PVD, renal HTN, PAD, GERD, diastolic heart failure, CRI, CAD, CHF, DINORAH, COPD, and arthritis who presents to the Emergency Department for evaluation of SOB which onset gradually one day ago. Pt was recently d/c from this ER on 12/30/19 for pneumonia. Symptoms are constant and moderate in severity. No mitigating or exacerbating factors reported. Associated sxs include cough and LLE edema. Patient denies any diaphoresis, fever, chills, abd pain, n/v/d, HA, dizziness, sore throat, palpitations, constipation, dysuria, congestion, and all other sxs at this time. Prior Tx includes Albuterol inhaler with no relief. Of note, pt is not on oxygen at home. No further complaints or concerns at this time.         Arrival mode: Personal vehicle    PCP: Rishabh Esteban MD        Past Medical History:  Past Medical History:   Diagnosis Date    DINORAH (acute kidney injury) 9/25/2016    Arthritis     CAD in native artery 7/21/2019    CHF (congestive heart failure)     Chronic obstructive pulmonary disease 9/12/2016    Coronary artery disease involving native coronary artery of native heart without angina pectoris 7/1/2016    CRI (chronic renal insufficiency)  2019     donor kidney transplant for DM 16     Induction with Thymo x3 and IV solumedrol to total 875mg  Kidney Biopsy  2016: 16 glomeruli, ACR type 1 AVR type 2, significant microcirculatory changes, c4d negative, No DSA, 5 to10% fibrosis. Treated with thymo x8 2016- no rejection      Diastolic heart failure     Encounter for blood transfusion     ESRD on RRT since 10/2013 10/29/2013    Biopsy proven diabetic nephropathy and lymphoplasmacytic interstitial infiltrate not c/w with AIN (ddx sjogrens or assoc with tamm-horsefall protein extravasation)     GERD (gastroesophageal reflux disease)     History of hepatitis C, s/p successful Rx w/ SVR12 - 2017    Completed 12 weeks harvoni w/ SVR    Hyperlipidemia     Hypertension     PAD (peripheral artery disease) 2019    PIC line (peripherally inserted central catheter) flush     Prophylactic immunotherapy     Proteinuria     PVD (peripheral vascular disease) 2017    RLE BKA CT 16 Extensive atherosclerotic disease of the aorta and mesenteric arteries.     Renal hypertension     Type 2 diabetes mellitus with diabetic neuropathy, with long-term current use of insulin 2016    Vitamin B12 deficiency        Past Surgical History:  Past Surgical History:   Procedure Laterality Date    AORTOGRAPHY WITH SERIALOGRAPHY N/A 2018    Procedure: LEFT LEG ANGIOGRAM;  Surgeon: Donal Mcdonald MD;  Location: [facility] CATH LAB;  Service: Vascular;  Laterality: N/A;    av bovine graft      Left UE    AV FISTULA PLACEMENT      left UE    CARDIAC CATHETERIZATION  2015    CLOSURE OF WOUND Left 2018    Procedure: CLOSURE, WOUND;  Surgeon: Karla Wheeler DPM;  Location: [facility] OR;  Service: Podiatry;  Laterality: Left;  Secondary Wound closure, extensive    CLOSURE OF WOUND Left 2018    Procedure: CLOSURE, WOUND;  Surgeon: Karla Wheeler DPM;  Location: [facility] OR;  Service: Podiatry;  Laterality:  Left;    DEBRIDEMENT OF MULTIPLE METATARSAL BONES Left 11/5/2018    Procedure: DEBRIDEMENT, METATARSAL BONE, 2 OR MORE BONES;  Surgeon: Karla Wheeler DPM;  Location: [facility] OR;  Service: Podiatry;  Laterality: Left;    EXCISION OF SKIN Left 9/27/2019    Procedure: EXCISION, SKIN;  Surgeon: Lenard Alarcon MD;  Location: [facility] OR 2ND FLR;  Service: Plastics;  Laterality: Left;  Plastics set, NIMS monitor, [employer]Cell    FOOT AMPUTATION THROUGH METATARSAL Left 9/21/2018    Procedure: AMPUTATION, FOOT, TRANSMETATARSAL;  Surgeon: Karla Wheeler DPM;  Location: [facility] OR;  Service: Podiatry;  Laterality: Left;    FOOT AMPUTATION THROUGH METATARSAL Left 10/31/2018    Procedure: AMPUTATION, FOOT, TRANSMETATARSAL;  Surgeon: Karla Wheeler DPM;  Location: [facility] OR;  Service: Podiatry;  Laterality: Left;    FOOT AMPUTATION THROUGH METATARSAL Left 11/5/2018    Procedure: AMPUTATION, FOOT, TRANSMETATARSAL;  Surgeon: Karla Wheeler DPM;  Location: [facility] OR;  Service: Podiatry;  Laterality: Left;  revisional transmetatarsal amputation, Left foot    IRRIGATION AND DEBRIDEMENT OF LOWER EXTREMITY Left 10/31/2018    Procedure: IRRIGATION AND DEBRIDEMENT, LOWER EXTREMITY;  Surgeon: Karla Wheeler DPM;  Location: [facility] OR;  Service: Podiatry;  Laterality: Left;    KIDNEY TRANSPLANT  05/21/2016    LEFT HEART CATHETERIZATION Left 7/21/2019    Procedure: CATHETERIZATION, HEART, LEFT;  Surgeon: Andrew Valdez MD;  Location: [facility] CATH LAB;  Service: Cardiology;  Laterality: Left;    LEG AMPUTATION THROUGH KNEE  2011    right LE, started as nail puncture leading to diabetic ulcer    SKIN FULL THICKNESS GRAFT Left 10/7/2019    Procedure: APPLICATION, GRAFT, SKIN, FULL-THICKNESS;  Surgeon: Lenard Alarcon MD;  Location: [facility] OR [facility] FLR;  Service: Plastics;  Laterality: Left;    SURGICAL REMOVAL OF LESION OF FACE Right 10/7/2019    Procedure: EXCISION, LESION, FACE;  Surgeon: Lenard Alarcon MD;  Location: [facility] OR [facility]  FLR;  Service: Plastics;  Laterality: Right;         Family History:  Family History   Problem Relation Age of Onset    Cancer Father     Diabetes Father     Heart failure Father     Stroke Father     Heart failure Mother     Kidney disease Neg Hx        Social History:  Social History     Tobacco Use    Smoking status: Former Smoker     Packs/day: 1.00     Years: 40.00     Pack years: 40.00     Last attempt to quit: 2013     Years since quittin.9    Smokeless tobacco: Never Used   Substance and Sexual Activity    Alcohol use: Yes     Alcohol/week: 6.0 standard drinks     Types: 6 Cans of beer per week     Comment: seldom    Drug use: No    Sexual activity: Never        Review of Systems     Review of Systems   Constitutional: Negative for chills, diaphoresis and fever.   HENT: Negative for congestion and sore throat.    Respiratory: Positive for cough and shortness of breath.    Cardiovascular: Positive for leg swelling (L). Negative for chest pain and palpitations.   Gastrointestinal: Negative for abdominal pain, constipation, diarrhea, nausea and vomiting.   Genitourinary: Negative for dysuria.   Musculoskeletal: Negative for back pain.   Skin: Negative for rash.   Neurological: Negative for dizziness, weakness and headaches.   Hematological: Does not bruise/bleed easily.   All other systems reviewed and are negative.       Physical Exam     Initial Vitals   BP Pulse Resp Temp SpO2   20 0354 20 0354 20 0354 20 0441 20 0354   136/84 82 (!) 26 97.6 °F (36.4 °C) (!) 90 %      MAP       --                 Physical Exam  Nursing Notes and Vital Signs Reviewed.  Constitutional: Patient is in no acute distress. Well-developed and well-nourished.  Head: Atraumatic. Normocephalic.  Eyes: PERRL. EOM intact. Conjunctivae are not pale. No scleral icterus.  ENT: Mucous membranes are moist. Oropharynx is clear and symmetric.    Neck: Supple. Full ROM. No  "lymphadenopathy.  Cardiovascular: Regular rate. Regular rhythm. No murmurs, rubs, or gallops. Distal pulses are 2+ and symmetric.  Pulmonary/Chest: Tachypneic. Decreased breath sounds bilaterally.  Abdominal: Soft and non-distended.  There is no tenderness.  No rebound, guarding, or rigidity. Good bowel sounds.  Musculoskeletal: Moves all extremities. No obvious deformities. 2+ edema to LLE. RLE DKA.  Skin: Warm and dry.  Neurological:  Alert, awake, and appropriate.  Normal speech.  No acute focal neurological deficits are appreciated.  Psychiatric: Normal affect. Good eye contact. Appropriate in content.     ED Course   Procedures  ED Vital Signs:  Vitals:    01/04/20 0354 01/04/20 0406 01/04/20 0441 01/04/20 0449   BP: 136/84      Pulse: 82 81 82 82   Resp: (!) 26  (!) 22 20   Temp:   97.6 °F (36.4 °C)    TempSrc:   Oral    SpO2: (!) 90%  96% 100%   Weight: 101.9 kg (224 lb 12.1 oz)      Height: 5' 11" (1.803 m)       01/04/20 0617 01/04/20 0653   BP: (!) 164/71    Pulse: 82 82   Resp: 14 18   Temp:     TempSrc:     SpO2: 98% (!) 92%   Weight:     Height:         Abnormal Lab Results:  Labs Reviewed   CBC W/ AUTO DIFFERENTIAL - Abnormal; Notable for the following components:       Result Value    Hemoglobin 13.8 (*)     Mean Corpuscular Hemoglobin Conc 31.2 (*)     All other components within normal limits   COMPREHENSIVE METABOLIC PANEL - Abnormal; Notable for the following components:    Creatinine 1.6 (*)     AST 51 (*)     ALT 46 (*)     eGFR if  51 (*)     eGFR if non  44 (*)     All other components within normal limits   B-TYPE NATRIURETIC PEPTIDE - Abnormal; Notable for the following components:     (*)     All other components within normal limits   URINALYSIS, REFLEX TO URINE CULTURE - Abnormal; Notable for the following components:    Specific Gravity, UA <=1.005 (*)     Protein, UA Trace (*)     All other components within normal limits    Narrative:     " Preferred Collection Type->Urine, Clean Catch   ISTAT PROCEDURE - Abnormal; Notable for the following components:    POC PO2 59 (*)     POC HCO3 23.2 (*)     POC SATURATED O2 89 (*)     All other components within normal limits   CULTURE, BLOOD   CULTURE, BLOOD   TROPONIN I   LACTIC ACID, PLASMA   PROCALCITONIN   TROPONIN I        All Lab Results:  Results for orders placed or performed during the hospital encounter of 01/04/20   CBC auto differential   Result Value Ref Range    WBC 5.93 3.90 - 12.70 K/uL    RBC 4.82 4.60 - 6.20 M/uL    Hemoglobin 13.8 (L) 14.0 - 18.0 g/dL    Hematocrit 44.3 40.0 - 54.0 %    Mean Corpuscular Volume 92 82 - 98 fL    Mean Corpuscular Hemoglobin 28.6 27.0 - 31.0 pg    Mean Corpuscular Hemoglobin Conc 31.2 (L) 32.0 - 36.0 g/dL    RDW 13.9 11.5 - 14.5 %    Platelets 167 150 - 350 K/uL    MPV 9.9 9.2 - 12.9 fL    Immature Granulocytes 0.2 0.0 - 0.5 %    Gran # (ANC) 2.5 1.8 - 7.7 K/uL    Immature Grans (Abs) 0.01 0.00 - 0.04 K/uL    Lymph # 2.4 1.0 - 4.8 K/uL    Mono # 0.9 0.3 - 1.0 K/uL    Eos # 0.2 0.0 - 0.5 K/uL    Baso # 0.02 0.00 - 0.20 K/uL    nRBC 0 0 /100 WBC    Gran% 42.7 38.0 - 73.0 %    Lymph% 39.8 18.0 - 48.0 %    Mono% 14.3 4.0 - 15.0 %    Eosinophil% 2.7 0.0 - 8.0 %    Basophil% 0.3 0.0 - 1.9 %    Differential Method Automated    Comprehensive metabolic panel   Result Value Ref Range    Sodium 137 136 - 145 mmol/L    Potassium 4.3 3.5 - 5.1 mmol/L    Chloride 101 95 - 110 mmol/L    CO2 24 23 - 29 mmol/L    Glucose 96 70 - 110 mg/dL    BUN, Bld 20 8 - 23 mg/dL    Creatinine 1.6 (H) 0.5 - 1.4 mg/dL    Calcium 10.0 8.7 - 10.5 mg/dL    Total Protein 7.9 6.0 - 8.4 g/dL    Albumin 3.8 3.5 - 5.2 g/dL    Total Bilirubin 0.5 0.1 - 1.0 mg/dL    Alkaline Phosphatase 81 55 - 135 U/L    AST 51 (H) 10 - 40 U/L    ALT 46 (H) 10 - 44 U/L    Anion Gap 12 8 - 16 mmol/L    eGFR if African American 51 (A) >60 mL/min/1.73 m^2    eGFR if non African American 44 (A) >60 mL/min/1.73 m^2   B-Type  natriuretic peptide (BNP)   Result Value Ref Range     (H) 0 - 99 pg/mL   Troponin I   Result Value Ref Range    Troponin I 0.026 0.000 - 0.026 ng/mL   Lactic acid, plasma #1   Result Value Ref Range    Lactate (Lactic Acid) 1.0 0.5 - 2.2 mmol/L   Urinalysis, Reflex to Urine Culture Urine, Clean Catch   Result Value Ref Range    Specimen UA Urine, Clean Catch     Color, UA Yellow Yellow, Straw, Jessa    Appearance, UA Clear Clear    pH, UA 7.0 5.0 - 8.0    Specific Gravity, UA <=1.005 (A) 1.005 - 1.030    Protein, UA Trace (A) Negative    Glucose, UA Negative Negative    Ketones, UA Negative Negative    Bilirubin (UA) Negative Negative    Occult Blood UA Negative Negative    Nitrite, UA Negative Negative    Urobilinogen, UA Negative <2.0 EU/dL    Leukocytes, UA Negative Negative   Procalcitonin   Result Value Ref Range    Procalcitonin 0.03 <0.25 ng/mL   Troponin I   Result Value Ref Range    Troponin I 0.009 0.000 - 0.026 ng/mL   ISTAT PROCEDURE   Result Value Ref Range    POC PH 7.367 7.35 - 7.45    POC PCO2 40.5 35 - 45 mmHg    POC PO2 59 (LL) 80 - 100 mmHg    POC HCO3 23.2 (L) 24 - 28 mmol/L    POC BE -2 -2 to 2 mmol/L    POC SATURATED O2 89 (L) 95 - 100 %    Sample ARTERIAL     Site RR     Allens Test Pass     DelSys Room Air     Mode SPONT     FiO2 21      *Note: Due to a large number of results and/or encounters for the requested time period, some results have not been displayed. A complete set of results can be found in Results Review.       Imaging Results:  Imaging Results          X-Ray Chest AP Portable (Final result)  Result time 01/04/20 07:22:09    Final result by Charbel Laguerre MD (01/04/20 07:22:09)                 Impression:      Negative chest x-ray.      Electronically signed by: Charbel Laguerre MD  Date:    01/04/2020  Time:    07:22             Narrative:    EXAMINATION:  XR CHEST AP PORTABLE    CLINICAL HISTORY:  Sepsis;    FINDINGS:  Comparison study 12/30/2019.  Normal size heart.   Aortic arch calcification no congestion.  Lungs are clear.                             5:09 AM: Per ED physician, pt's CXR results: No acute findings.      The EKG was ordered, reviewed, and independently interpreted by the ED provider.  Interpretation time: 4:25  Rate: 84 BPM  Rhythm: sinus rhythm with 1st degree AV block  Interpretation: Left axis deviation. No STEMI.             The Emergency Provider reviewed the vital signs and test results, which are outlined above.     ED Discussion   6:00 AM: Dr. Nunez transfers care of pt to Dr. Oleary pending lab results.    7:00 AM:  Patient well known to the ED and hospital, has been having chronic dyspnea for weeks now.  He has had an extensive workup and personally I feel this is likely anxiety related. He was able to ambulate in the hallway with no significant change in oxygen level.  He does not qualify for home oxygen.  A nasal cannula was placed on patient without oxygen actually running and he felt better which makes me think there is a level of anxiety.  I do not feel he warrants admission. Labs at baseline, xray unremarkable.     7:26 AM:  Discussed with pt all pertinent ED information and results. Discussed pt dx and plan of tx. Gave pt all f/u and return to the ED instructions. All questions and concerns were addressed at this time. Pt expresses understanding of information and instructions, and is comfortable with plan to discharge. Pt is stable for discharge.    I discussed with patient and/or family/caretaker that evaluation in the ED does not suggest any emergent or life threatening medical conditions requiring immediate intervention beyond what was provided in the ED, and I believe patient is safe for discharge.  Regardless, an unremarkable evaluation in the ED does not preclude the development or presence of a serious of life threatening condition. As such, patient was instructed to return immediately for any worsening or change in current  symptoms.         Medical Decision Making:   Clinical Tests:   Lab Tests: Ordered and Reviewed  Radiological Study: Ordered and Reviewed  Medical Tests: Ordered and Reviewed           ED Medication(s):  Medications   levalbuterol nebulizer solution 1.2495 mg (0.63 mg Nebulization Given 1/4/20 4387)   ondansetron injection 4 mg (4 mg Intravenous Given 1/4/20 9189)       New Prescriptions    HYDROXYZINE PAMOATE (VISTARIL) 25 MG CAP    Take 1 capsule (25 mg total) by mouth every 8 (eight) hours as needed (anxiety).    LEVALBUTEROL (XOPENEX) 0.63 MG/3 ML NEBULIZER SOLUTION    Take 3 mLs (0.63 mg total) by nebulization every 4 (four) hours as needed for Wheezing or Shortness of Breath. Rescue       Follow-up Information     [facility]  PULMON[facility]. Schedule an appointment as soon as possible for a visit in 2 days.    Specialty:  Pulmonology  Why:  Return to the Emergency Room  Contact information:  [address] [facility] [address] [phone]                     Scribe Attestation:   Scribe #1: I performed the above scribed service and the documentation accurately describes the services I performed. I attest to the accuracy of the note.     Attending:   Physician Attestation Statement for Scribe #1: I, Kavita Nunez MD, personally performed the services described in this documentation, as scribed by Lazaro Esteves, in my presence, and it is both accurate and complete.       Scribe Attestation:   Scribe #2: I performed the above scribed service and the documentation accurately describes the services I performed. I attest to the accuracy of the note.    Attending Attestation:           Physician Attestation for Scribe:    Physician Attestation Statement for Scribe #2: I, Charito Oleary MD, reviewed documentation, as scribed by Lavelle Kamara in my presence, and it is both accurate and complete. I also acknowledge and confirm the content of the note done by Scribe #1.            Clinical Impression       ICD-10-CM ICD-9-CM   1. Chronic dyspnea R06.00 786.09   2. Shortness of breath R06.02 786.05   3. ESRD (end stage renal disease) N18.6 585.6   4. History of kidney transplant Z94.0 V42.0   5. History of COPD Z87.09 V12.69   6. Anxiety F41.9 300.00       Disposition:   Disposition: Discharged  Condition: Stable         Charito Oleary MD  01/04/20 0953

## 2020-01-04 NOTE — ED NOTES
Pt ambulated to end of mann. O2 sats dropped to 92% at lowest and immediately went up to 95% went pt sat down. MD informed.

## 2020-01-05 PROBLEM — G47.33 OSA (OBSTRUCTIVE SLEEP APNEA): Status: ACTIVE | Noted: 2020-01-05

## 2020-01-05 LAB
BACTERIA BLD CULT: NORMAL
BACTERIA BLD CULT: NORMAL

## 2020-01-06 ENCOUNTER — TELEPHONE (OUTPATIENT)
Dept: PULMONOLOGY | Facility: CLINIC | Age: 65
End: 2020-01-06

## 2020-01-06 NOTE — TELEPHONE ENCOUNTER
----- Message from Milvia Valdez sent at 1/6/2020 10:30 AM CST -----  Contact: [phone]  Would like to consult with the nurse, Patient has an Appt Jan 8, 2020, Patient has been to the Er 4 times and would like to know if he can get a sooner Appt to come in to be seen, Patient would like to speak with the nurse as soon as possible concerning this, Please call back at [phone], thanks sj

## 2020-01-07 ENCOUNTER — CLINICAL SUPPORT (OUTPATIENT)
Dept: PULMONOLOGY | Facility: CLINIC | Age: 65
End: 2020-01-07
Payer: MEDICARE

## 2020-01-07 ENCOUNTER — OFFICE VISIT (OUTPATIENT)
Dept: PULMONOLOGY | Facility: CLINIC | Age: 65
End: 2020-01-07
Payer: MEDICARE

## 2020-01-07 VITALS
WEIGHT: 225 LBS | RESPIRATION RATE: 20 BRPM | SYSTOLIC BLOOD PRESSURE: 132 MMHG | HEART RATE: 93 BPM | BODY MASS INDEX: 31.5 KG/M2 | HEIGHT: 71 IN | DIASTOLIC BLOOD PRESSURE: 86 MMHG | OXYGEN SATURATION: 99 %

## 2020-01-07 DIAGNOSIS — G47.33 OSA (OBSTRUCTIVE SLEEP APNEA): ICD-10-CM

## 2020-01-07 DIAGNOSIS — G47.34 NOCTURNAL HYPOXIA: ICD-10-CM

## 2020-01-07 DIAGNOSIS — I73.9 PVD (PERIPHERAL VASCULAR DISEASE): ICD-10-CM

## 2020-01-07 DIAGNOSIS — J44.89 ASTHMA WITH COPD: Primary | ICD-10-CM

## 2020-01-07 PROCEDURE — 3079F PR MOST RECENT DIASTOLIC BLOOD PRESSURE 80-89 MM HG: ICD-10-PCS | Mod: HCNC,CPTII,S$GLB, | Performed by: INTERNAL MEDICINE

## 2020-01-07 PROCEDURE — 1159F MED LIST DOCD IN RCRD: CPT | Mod: HCNC,S$GLB,, | Performed by: INTERNAL MEDICINE

## 2020-01-07 PROCEDURE — 99999 PR PBB SHADOW E&M-EST. PATIENT-LVL V: ICD-10-PCS | Mod: PBBFAC,HCNC,, | Performed by: INTERNAL MEDICINE

## 2020-01-07 PROCEDURE — 1101F PT FALLS ASSESS-DOCD LE1/YR: CPT | Mod: HCNC,CPTII,S$GLB, | Performed by: INTERNAL MEDICINE

## 2020-01-07 PROCEDURE — 3075F SYST BP GE 130 - 139MM HG: CPT | Mod: HCNC,CPTII,S$GLB, | Performed by: INTERNAL MEDICINE

## 2020-01-07 PROCEDURE — 3079F DIAST BP 80-89 MM HG: CPT | Mod: HCNC,CPTII,S$GLB, | Performed by: INTERNAL MEDICINE

## 2020-01-07 PROCEDURE — 99999 PR PBB SHADOW E&M-EST. PATIENT-LVL I: CPT | Mod: PBBFAC,HCNC,,

## 2020-01-07 PROCEDURE — 99215 OFFICE O/P EST HI 40 MIN: CPT | Mod: 25,HCNC,S$GLB, | Performed by: INTERNAL MEDICINE

## 2020-01-07 PROCEDURE — 1159F PR MEDICATION LIST DOCUMENTED IN MEDICAL RECORD: ICD-10-PCS | Mod: HCNC,S$GLB,, | Performed by: INTERNAL MEDICINE

## 2020-01-07 PROCEDURE — 99999 PR PBB SHADOW E&M-EST. PATIENT-LVL I: ICD-10-PCS | Mod: PBBFAC,HCNC,,

## 2020-01-07 PROCEDURE — 1101F PR PT FALLS ASSESS DOC 0-1 FALLS W/OUT INJ PAST YR: ICD-10-PCS | Mod: HCNC,CPTII,S$GLB, | Performed by: INTERNAL MEDICINE

## 2020-01-07 PROCEDURE — 99999 PR PBB SHADOW E&M-EST. PATIENT-LVL V: CPT | Mod: PBBFAC,HCNC,, | Performed by: INTERNAL MEDICINE

## 2020-01-07 PROCEDURE — 3075F PR MOST RECENT SYSTOLIC BLOOD PRESS GE 130-139MM HG: ICD-10-PCS | Mod: HCNC,CPTII,S$GLB, | Performed by: INTERNAL MEDICINE

## 2020-01-07 PROCEDURE — 99215 PR OFFICE/OUTPT VISIT, EST, LEVL V, 40-54 MIN: ICD-10-PCS | Mod: 25,HCNC,S$GLB, | Performed by: INTERNAL MEDICINE

## 2020-01-07 PROCEDURE — 94762 PR NONINVASV OXYGEN SATUR, CONT: ICD-10-PCS | Mod: HCNC,S$GLB,, | Performed by: INTERNAL MEDICINE

## 2020-01-07 PROCEDURE — 94762 N-INVAS EAR/PLS OXIMTRY CONT: CPT | Mod: HCNC,S$GLB,, | Performed by: INTERNAL MEDICINE

## 2020-01-07 RX ORDER — LEVALBUTEROL INHALATION SOLUTION 1.25 MG/3ML
1 SOLUTION RESPIRATORY (INHALATION)
Qty: 288 ML | Refills: 11 | Status: ON HOLD | OUTPATIENT
Start: 2020-01-07 | End: 2022-01-01 | Stop reason: HOSPADM

## 2020-01-07 RX ORDER — PEAK FLOW METER
EACH MISCELLANEOUS
Status: ON HOLD | COMMUNITY
Start: 2019-12-30 | End: 2022-01-01 | Stop reason: HOSPADM

## 2020-01-07 RX ORDER — SEVELAMER CARBONATE 800 MG/1
800 TABLET, FILM COATED ORAL
Status: ON HOLD | COMMUNITY
Start: 2013-12-21 | End: 2022-01-01 | Stop reason: HOSPADM

## 2020-01-07 RX ORDER — MYCOPHENOLATE MOFETIL 500 MG/1
TABLET ORAL
COMMUNITY
Start: 2019-12-07 | End: 2020-04-16

## 2020-01-07 RX ORDER — IPRATROPIUM BROMIDE AND ALBUTEROL SULFATE 2.5; .5 MG/3ML; MG/3ML
3 SOLUTION RESPIRATORY (INHALATION) EVERY 6 HOURS PRN
Qty: 1 BOX | Refills: 11 | Status: SHIPPED | OUTPATIENT
Start: 2020-01-07 | End: 2020-01-07 | Stop reason: SINTOL

## 2020-01-07 RX ORDER — IPRATROPIUM BROMIDE 0.5 MG/2.5ML
500 SOLUTION RESPIRATORY (INHALATION) 4 TIMES DAILY
Qty: 120 VIAL | Refills: 11 | Status: ON HOLD | OUTPATIENT
Start: 2020-01-07 | End: 2022-01-01 | Stop reason: HOSPADM

## 2020-01-07 NOTE — PROGRESS NOTES
Subjective:       Patient ID: Lavelle Ladd is a 64 y.o. male.    Chief Complaint: He       Pneumonia (CXR DONE 01/04/2020) and Shortness of Breath    HPI     Dyspnea  64 y.o. male patient with a PMHx of DM, PVD, renal HTN, PAD, GERD, diastolic heart failure, CRI, CAD, CHF, DINORAH, COPD, and arthritis who presents tfor evaluation of SOB with sleeping which onset gradually over the last few days. Complains of shortness of breath. Symptoms occur while supine only, while getting dressed. Symptoms began 3 months ago, gradually worsening since. Associated symptoms include  chest pain, located left chest, dry cough, dyspnea on exertion, dyspnea when laying down, shortness of breath and tightness in chest. He denies productive cough. He does not have had recent travel. Weight has been stable. Symptoms are exacerbated by any exercise. Symptoms are alleviated by rest.      Hx MI in 10/2019      Sleep Apnea  He presents for a sleep evaluation. He complains of snoring, periods of not breathing, decreased concentration, excessive daytime sleepiness, falling asleep while watching television, feels sleepy during the day, take naps during the day.  Symptoms began 2 years ago, gradually worsening since that time.  He goes to sleep at 10- 2 or 3 in the am weekdays and  weekends. He awakens 5 weekdays and 7 weekends. He falls asleep in 30 minutes.  Collar size na. He denies knees buckling with laughing, completely or partially paralyzed while falling asleep or waking up. Previous evaluation and treatment has included none.    COPD  He presents for evaluation and treatment of COPD. The patient is  currently have symptoms / an exacerbation. The patient has COPD for approximately 10 years. Symptoms in previous episodes have included dyspnea, and typically last 2 weeks. Previous episodes have been exacerbated by moderate activity and yardwork. Current treatment includes albuterol inhaler, which generally provides some relief of  symptoms.      He uses 1 pillows at night. Patient currently is not on home oxygen therapy.. The patient is having no constitutional symptoms, denying fever, chills, anorexia, or weight loss. The patient has not been hospitalized for this condition before. He quit smoking approximately 6 years ago. The patient is experiencing exercise intolerance (difficulty walking 3 blocks on flat ground).     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  Toxic Fume Exposure: multiple times - pneumonia from chlorine      Past Medical History:   Diagnosis Date    DINORAH (acute kidney injury) 2016    Arthritis     CAD in native artery 2019    CHF (congestive heart failure)     Chronic obstructive pulmonary disease 2016    Coronary artery disease involving native coronary artery of native heart without angina pectoris 2016    CRI (chronic renal insufficiency) 2019     donor kidney transplant for DM 16     Induction with Thymo x3 and IV solumedrol to total 875mg  Kidney Biopsy  2016: 16 glomeruli, ACR type 1 AVR type 2, significant microcirculatory changes, c4d negative, No DSA, 5 to10% fibrosis. Treated with thymo x8 2016- no rejection      Diastolic heart failure     Encounter for blood transfusion     ESRD on RRT since 10/2013 10/29/2013    Biopsy proven diabetic nephropathy and lymphoplasmacytic interstitial infiltrate not c/w with AIN (ddx sjogrens or assoc with tamm-horsefall protein extravasation)     GERD (gastroesophageal reflux disease)     History of hepatitis C, s/p successful Rx w/ SVR12 - 2017    Completed 12 weeks harvoni w/ SVR    Hyperlipidemia     Hypertension     PAD (peripheral artery disease) 2019    PIC line (peripherally  inserted central catheter) flush     Prophylactic immunotherapy     Proteinuria     PVD (peripheral vascular disease) 6/26/2017    RLE BKA CT 12/11/16 Extensive atherosclerotic disease of the aorta and mesenteric arteries.     Renal hypertension     Type 2 diabetes mellitus with diabetic neuropathy, with long-term current use of insulin 12/1/2016    Vitamin B12 deficiency      Past Surgical History:   Procedure Laterality Date    AORTOGRAPHY WITH SERIALOGRAPHY N/A 6/14/2018    Procedure: LEFT LEG ANGIOGRAM;  Surgeon: Donal Mcdonald MD;  Location: [facility] CATH LAB;  Service: Vascular;  Laterality: N/A;    av bovine graft      Left UE    AV FISTULA PLACEMENT      left UE    CARDIAC CATHETERIZATION  02/2015    CLOSURE OF WOUND Left 9/24/2018    Procedure: CLOSURE, WOUND;  Surgeon: Karla Wheeler DPM;  Location: [facility] OR;  Service: Podiatry;  Laterality: Left;  Secondary Wound closure, extensive    CLOSURE OF WOUND Left 11/5/2018    Procedure: CLOSURE, WOUND;  Surgeon: Karla Wheeler DPM;  Location: [facility] OR;  Service: Podiatry;  Laterality: Left;    DEBRIDEMENT OF MULTIPLE METATARSAL BONES Left 11/5/2018    Procedure: DEBRIDEMENT, METATARSAL BONE, 2 OR MORE BONES;  Surgeon: Karla Wheeler DPM;  Location: [facility] OR;  Service: Podiatry;  Laterality: Left;    EXCISION OF SKIN Left 9/27/2019    Procedure: EXCISION, SKIN;  Surgeon: Lenard Alarcon MD;  Location: [address];  Service: Plastics;  Laterality: Left;  Plastics set, NIMS monitor, [employer]    FOOT AMPUTATION THROUGH METATARSAL Left 9/21/2018    Procedure: AMPUTATION, FOOT, TRANSMETATARSAL;  Surgeon: Karla Wheeler DPM;  Location: [facility] OR;  Service: Podiatry;  Laterality: Left;    FOOT AMPUTATION THROUGH METATARSAL Left 10/31/2018    Procedure: AMPUTATION, FOOT, TRANSMETATARSAL;  Surgeon: Karla Wheeler DPM;  Location: [facility] OR;  Service: Podiatry;  Laterality: Left;    FOOT AMPUTATION THROUGH METATARSAL Left 11/5/2018     Procedure: AMPUTATION, FOOT, TRANSMETATARSAL;  Surgeon: Karla Wheeler DPM;  Location: [facility] OR;  Service: Podiatry;  Laterality: Left;  revisional transmetatarsal amputation, Left foot    IRRIGATION AND DEBRIDEMENT OF LOWER EXTREMITY Left 10/31/2018    Procedure: IRRIGATION AND DEBRIDEMENT, LOWER EXTREMITY;  Surgeon: Karla Wheeler DPM;  Location: [facility] OR;  Service: Podiatry;  Laterality: Left;    KIDNEY TRANSPLANT  2016    LEFT HEART CATHETERIZATION Left 2019    Procedure: CATHETERIZATION, HEART, LEFT;  Surgeon: Andrew Valdez MD;  Location: [facility] CATH LAB;  Service: Cardiology;  Laterality: Left;    LEG AMPUTATION THROUGH KNEE      right LE, started as nail puncture leading to diabetic ulcer    SKIN FULL THICKNESS GRAFT Left 10/7/2019    Procedure: APPLICATION, GRAFT, SKIN, FULL-THICKNESS;  Surgeon: Lenard Alarcon MD;  Location: [address];  Service: Plastics;  Laterality: Left;    SURGICAL REMOVAL OF LESION OF FACE Right 10/7/2019    Procedure: EXCISION, LESION, FACE;  Surgeon: Lenard Alarcon MD;  [facility];  Service: Plastics;  Laterality: Right;     Social History     Socioeconomic History    Marital status: Single     Spouse name: Not on file    Number of children: Not on file    Years of education: Not on file    Highest education level: Not on file   Occupational History    Occupation: Disabled     Employer: DISABLED   Social Needs    Financial resource strain: Not on file    Food insecurity:     Worry: Not on file     Inability: Not on file    Transportation needs:     Medical: Not on file     Non-medical: Not on file   Tobacco Use    Smoking status: Former Smoker     Packs/day: 1.00     Years: 40.00     Pack years: 40.00     Last attempt to quit: 2013     Years since quittin.9    Smokeless tobacco: Never Used   Substance and Sexual Activity    Alcohol use: Yes     Alcohol/week: 6.0 standard drinks     Types: 6 Cans of beer per week  "    Comment: seldom    Drug use: No    Sexual activity: Never   Lifestyle    Physical activity:     Days per week: Not on file     Minutes per session: Not on file    Stress: Not on file   Relationships    Social connections:     Talks on phone: Not on file     Gets together: Not on file     Attends Spiritism service: Not on file     Active member of club or organization: Not on file     Attends meetings of clubs or organizations: Not on file     Relationship status: Not on file   Other Topics Concern    Not on file   Social History Narrative    Lives alone. Retired from construction and various jobs, now retired. Would like to return to work PT to alleviate boredom.     Review of Systems   Constitutional: Positive for fatigue. Negative for fever.   HENT: Positive for postnasal drip, rhinorrhea and congestion.    Eyes: Negative for redness and itching.   Respiratory: Positive for cough, sputum production, shortness of breath, dyspnea on extertion, use of rescue inhaler and Paroxysmal Nocturnal Dyspnea.    Cardiovascular: Negative for chest pain, palpitations and leg swelling.   Genitourinary: Negative for difficulty urinating and hematuria.   Endocrine: Negative for cold intolerance and heat intolerance.    Skin: Negative for rash.   Gastrointestinal: Negative for nausea and abdominal pain.   Neurological: Negative for dizziness, syncope, weakness and light-headedness.   Hematological: Negative for adenopathy. Does not bruise/bleed easily.   Psychiatric/Behavioral: Negative for sleep disturbance. The patient is not nervous/anxious.        Objective:      /86   Pulse 93   Resp 20   Ht 5' 11" (1.803 m)   Wt 102.1 kg (225 lb)   SpO2 99%   BMI 31.38 kg/m²   Physical Exam   Constitutional: He is oriented to person, place, and time. He appears well-developed and well-nourished.   HENT:   Head: Normocephalic and atraumatic.   Eyes: Pupils are equal, round, and reactive to light. Conjunctivae are normal. "   Neck: Neck supple. No JVD present. No tracheal deviation present. No thyromegaly present.   Cardiovascular: Normal rate and regular rhythm. Exam reveals decreased pulses.   No murmur heard.  Pulmonary/Chest: He has decreased breath sounds. He has wheezes in the right lower field and the left lower field. He has no rhonchi. He has no rales.   Abdominal: Soft. Bowel sounds are normal.   Musculoskeletal: He exhibits no edema or tenderness.   Artificial right leg   Lymphadenopathy:     He has no cervical adenopathy.   Neurological: He is alert and oriented to person, place, and time.   Skin: Skin is warm and dry.   Nursing note and vitals reviewed.    Personal Diagnostic Review  Chest x-ray: hyperinflation    X-Ray Chest AP Portable  Narrative: EXAMINATION:  XR CHEST AP PORTABLE    CLINICAL HISTORY:  Sepsis;    FINDINGS:  Comparison study 12/30/2019.  Normal size heart.  Aortic arch calcification no congestion.  Lungs are clear.  Impression: Negative chest x-ray.    Electronically signed by: Charbel Laguerre MD  Date:    01/04/2020  Time:    07:22      Office Spirometry Results:     No flowsheet data found.  Pulmonary Studies Review 1/7/2020   SpO2 99   Height 71.000   Weight 3600   BMI (Calculated) 31.4   Predicted Distance 352.81   Predicted Distance Meters (Calculated) 544.93         Assessment:            Asthma with COPD  -     Discontinue: albuterol-ipratropium (DUO-NEB) 2.5 mg-0.5 mg/3 mL nebulizer solution; Take 3 mLs by nebulization every 6 (six) hours as needed for Wheezing. Rescue  Dispense: 1 Box; Refill: 11  -     levalbuterol (XOPENEX) 1.25 mg/3 mL nebulizer solution; Take 3 mLs (1.25 mg total) by nebulization every 6 to 8 hours as needed for Wheezing or Shortness of Breath.  Dispense: 288 mL; Refill: 11  -     ipratropium (ATROVENT) 0.02 % nebulizer solution; Take 2.5 mLs (500 mcg total) by nebulization 4 (four) times daily.  Dispense: 120 vial; Refill: 11  -     Six Minute Walk Test to qualify for Home  Oxygen; Future    MARIA INES (obstructive sleep apnea)  -     Polysomnogram (CPAP will be added if patient meets diagnostic criteria.); Future  -     Six Minute Walk Test to qualify for Home Oxygen; Future    PVD (peripheral vascular disease)  -     Ambulatory consult to Vascular Surgery    Nocturnal hypoxia  -     PULSE OXIMETRY OVERNIGHT; Future          Outpatient Encounter Medications as of 1/7/2020   Medication Sig Dispense Refill    amLODIPine (NORVASC) 10 MG tablet Take 1 tablet (10 mg total) by mouth once daily. 90 tablet 3    amoxicillin-clavulanate 875-125mg (AUGMENTIN) 875-125 mg per tablet Take 1 tablet by mouth every 12 (twelve) hours. for 7 days 14 tablet 0    aspirin (ECOTRIN) 81 MG EC tablet Take 1 tablet (81 mg total) by mouth once daily.  0    atorvastatin (LIPITOR) 80 MG tablet Take 1 tablet (80 mg total) by mouth once daily. 30 tablet 11    BD INSULIN SYRINGE ULTRA-FINE 1 mL 31 gauge x 5/16 Syrg USE ONE AS DIRECTED FOUR TIMES DAILY WITH MEALS AND AT BEDTIME 120 each 10    blood sugar diagnostic Strp 1 each by Misc.(Non-Drug; Combo Route) route 3 (three) times daily. 100 each 11    blood-glucose meter kit Use as instructed 1 each 0    clopidogrel (PLAVIX) 75 mg tablet Take 1 tablet (75 mg total) by mouth once daily. 30 tablet 11    diclofenac (FLECTOR) 1.3 % PT12 Apply 1 packet topically once daily.      ergocalciferol (ERGOCALCIFEROL) 50,000 unit Cap Take 1 capsule (50,000 Units total) by mouth every 7 days. Take on Mondays 4 capsule 11    flash glucose scanning reader (FREESTYLE BRYAN 14 DAY READER) Misc 1 Device by Misc.(Non-Drug; Combo Route) route as needed. 1 each 0    flash glucose sensor (FREESTYLE BRYAN 14 DAY SENSOR) Kit 1 kit by Misc.(Non-Drug; Combo Route) route every 14 (fourteen) days. 2 kit 11    FLUAD [id number], 65 YR UP,,PF, 45 mcg (15 mcg x 3)/0.5 mL Syrg       gabapentin (NEURONTIN) 300 MG capsule Take 1 capsule (300 mg total) by mouth 3 (three) times daily. for 10 days  "30 capsule 0    HYDROcodone-acetaminophen (NORCO)  mg per tablet 1 tablet by Per G Tube route every 6 (six) hours as needed for Pain. 15 tablet 0    hydrOXYzine pamoate (VISTARIL) 25 MG Cap Take 1 capsule (25 mg total) by mouth every 8 (eight) hours as needed (anxiety). 20 capsule 0    isosorbide mononitrate (IMDUR) 30 MG 24 hr tablet Take 2 tablets (60 mg total) by mouth once daily. 60 tablet 11    liraglutide 0.6 mg/0.1 mL, 18 mg/3 mL, subq PNIJ (VICTOZA 2-KOKI) 0.6 mg/0.1 mL (18 mg/3 mL) PnIj Inject 1.8 mg into the skin once daily. 9 mL 11    methylphenidate HCl (RITALIN) 10 MG tablet Take 1 tablet (10 mg total) by mouth 2 (two) times daily with meals. 30 tablet 0    metoprolol tartrate (LOPRESSOR) 25 MG tablet Take 1 tablet (25 mg total) by mouth 2 (two) times daily. 60 tablet 11    mupirocin (BACTROBAN) 2 % ointment Apply topically 3 (three) times daily. 30 g 1    mupirocin calcium 2% (BACTROBAN) 2 % cream Apply topically 3 (three) times daily. 30 g 1    mycophenolate (CELLCEPT) 500 mg Tab       naloxone (NARCAN) 4 mg/actuation Spry 1 spray by Nasal route once.      nitroGLYCERIN (NITROSTAT) 0.4 MG SL tablet Place 1 tablet (0.4 mg total) under the tongue every 5 (five) minutes as needed. 30 tablet 0    ondansetron (ZOFRAN-ODT) 4 MG TbDL Take 1 tablet (4 mg total) by mouth every 8 (eight) hours as needed. 21 tablet 1    pen needle, diabetic (SURE-FINE PEN NEEDLES) 31 gauge x 3/16" Ndle 1 each by Misc.(Non-Drug; Combo Route) route 4 (four) times daily with meals and nightly. 120 each 11    pen needle, diabetic 32 gauge x 5/32" Ndle 1 each by Misc.(Non-Drug; Combo Route) route 4 (four) times daily. Urszula Needles 200 each 11    predniSONE (DELTASONE) 5 MG tablet Take 1 tablet (5 mg total) by mouth once daily. 30 tablet 11    sevelamer carbonate (RENVELA) 800 mg Tab       tacrolimus (PROGRAF) 0.5 MG Cap Take 3 capsules (1.5 mg total) by mouth every 12 (twelve) hours. 180 capsule 11    TRESIBA " FLEXTOUCH U-100 100 unit/mL (3 mL) InPn Inject 30 Units into the skin 2 (two) times daily.      VIOS AEROSOL DELIVERY SYSTEM [place] USE Q 4 H PRN      [DISCONTINUED] albuterol sulfate 2.5 mg/0.5 mL Nebu Take 2.5 mg by nebulization every 4 (four) hours as needed. Rescue 20 each 0    [DISCONTINUED] levalbuterol (XOPENEX) 0.63 mg/3 mL nebulizer solution Take 3 mLs (0.63 mg total) by nebulization every 4 (four) hours as needed for Wheezing or Shortness of Breath. Rescue 1 Box 1    ipratropium (ATROVENT) 0.02 % nebulizer solution Take 2.5 mLs (500 mcg total) by nebulization 4 (four) times daily. 120 vial 11    levalbuterol (XOPENEX) 1.25 mg/3 mL nebulizer solution Take 3 mLs (1.25 mg total) by nebulization every 6 to 8 hours as needed for Wheezing or Shortness of Breath. 288 mL 11    [DISCONTINUED] albuterol-ipratropium (DUO-NEB) 2.5 mg-0.5 mg/3 mL nebulizer solution Take 3 mLs by nebulization every 6 (six) hours as needed for Wheezing. Rescue 1 Box 11     No facility-administered encounter medications on file as of 1/7/2020.      Plan:       Requested Prescriptions     Signed Prescriptions Disp Refills    levalbuterol (XOPENEX) 1.25 mg/3 mL nebulizer solution 288 mL 11     Sig: Take 3 mLs (1.25 mg total) by nebulization every 6 to 8 hours as needed for Wheezing or Shortness of Breath.    ipratropium (ATROVENT) 0.02 % nebulizer solution 120 vial 11     Sig: Take 2.5 mLs (500 mcg total) by nebulization 4 (four) times daily.     Problem List Items Addressed This Visit     MARIA INES (obstructive sleep apnea)    Relevant Orders    Polysomnogram (CPAP will be added if patient meets diagnostic criteria.)    Six Minute Walk Test to qualify for Home Oxygen      Other Visit Diagnoses     Asthma with COPD    -  Primary    Relevant Medications    levalbuterol (XOPENEX) 1.25 mg/3 mL nebulizer solution    ipratropium (ATROVENT) 0.02 % nebulizer solution    Other Relevant Orders    Six Minute Walk Test to qualify for Home Oxygen     PVD (peripheral vascular disease)        Relevant Orders    Ambulatory consult to Vascular Surgery    Nocturnal hypoxia        Relevant Orders    PULSE OXIMETRY OVERNIGHT      64 y.o. male patient with a PMHx of DM, PVD, renal HTN, PAD, GERD, diastolic heart failure, CRI, CAD, CHF, DINORAH, COPD, and arthritis who presents to the Emergency Department for evaluation of SOB with sleeping which onset gradually over the last 2 hours.       Follow up in about 4 weeks (around 2/4/2020) for Review Sleep Study, 6 min walk.    MEDICAL DECISION MAKING: Moderate to high complexity.  Overall, the multiple problems listed are of moderate to high severity that may impact quality of life and activities of daily living. Side effects of medications, treatment plan as well as options and alternatives reviewed and discussed with patient. There was counseling of patient concerning these issues.    Total time spent in face to face counseling and coordination of care - 45  minutes over 50% of time was used in discussion of prognosis, risks, benefits of treatment, instructions and compliance with regimen . Discussion with other physicians or health care providers (DME, NP, pharmacy, respiratory therapy) occurred.

## 2020-01-07 NOTE — LETTER
January 7, 2020      Chapin Rodríguez II, MD  [address] [place] [address] [address] [place] [id number]           [facility]  [address]  [place] [address]  Phone: [phone]  Fax: [phone]          Patient: Lavelle Ladd   MR Number: [id number]   YOB: 1955   Date of Visit: 1/7/2020       Dear Dr. Chapin Rodríguez II:    Thank you for referring Lavelle aLdd to me for evaluation. Attached you will find relevant portions of my assessment and plan of care.    If you have questions, please do not hesitate to call me. I look forward to following Lavelle Ladd along with you.    Sincerely,    Colin Garcia MD    Enclosure  CC:  No Recipients    If you would like to receive this communication electronically, please contact [email or web address] or [phone] to request more information on [email or web address] Link access.    For providers and/or their staff who would like to refer a patient to [facility], please contact us through our one-stop-shop provider referral line, [facility], at [phone].    If you feel you have received this communication in error or would no longer like to receive these types of communications, please e-mail [email or web address]

## 2020-01-07 NOTE — PATIENT INSTRUCTIONS
Please call the [facility] to schedule sleep study at  [phone] . Usually take 1- 2 days to get insurance company approval.  You will need to schedule at follow up clinic appointment 7 days after the sleep study to review the results.

## 2020-01-09 LAB
BACTERIA BLD CULT: NORMAL
BACTERIA BLD CULT: NORMAL

## 2020-01-15 ENCOUNTER — OFFICE VISIT (OUTPATIENT)
Dept: CARDIOLOGY | Facility: CLINIC | Age: 65
End: 2020-01-15
Payer: MEDICARE

## 2020-01-15 VITALS
SYSTOLIC BLOOD PRESSURE: 92 MMHG | HEART RATE: 84 BPM | BODY MASS INDEX: 31.38 KG/M2 | WEIGHT: 225 LBS | DIASTOLIC BLOOD PRESSURE: 62 MMHG

## 2020-01-15 DIAGNOSIS — I25.118 CORONARY ARTERY DISEASE OF NATIVE ARTERY OF NATIVE HEART WITH STABLE ANGINA PECTORIS: ICD-10-CM

## 2020-01-15 DIAGNOSIS — I25.10 CAD IN NATIVE ARTERY: Primary | ICD-10-CM

## 2020-01-15 DIAGNOSIS — J44.9 CHRONIC OBSTRUCTIVE PULMONARY DISEASE, UNSPECIFIED COPD TYPE: ICD-10-CM

## 2020-01-15 DIAGNOSIS — I10 ESSENTIAL HYPERTENSION: ICD-10-CM

## 2020-01-15 DIAGNOSIS — I73.9 PERIPHERAL VASCULAR DISEASE: ICD-10-CM

## 2020-01-15 DIAGNOSIS — I25.2 OLD MI (MYOCARDIAL INFARCTION): ICD-10-CM

## 2020-01-15 DIAGNOSIS — N18.30 STAGE 3 CHRONIC KIDNEY DISEASE: ICD-10-CM

## 2020-01-15 DIAGNOSIS — I12.9 RENAL HYPERTENSION: Chronic | ICD-10-CM

## 2020-01-15 PROCEDURE — 3074F SYST BP LT 130 MM HG: CPT | Mod: HCNC,CPTII,S$GLB, | Performed by: INTERNAL MEDICINE

## 2020-01-15 PROCEDURE — 99499 RISK ADDL DX/OHS AUDIT: ICD-10-PCS | Mod: HCNC,S$GLB,, | Performed by: INTERNAL MEDICINE

## 2020-01-15 PROCEDURE — 3078F PR MOST RECENT DIASTOLIC BLOOD PRESSURE < 80 MM HG: ICD-10-PCS | Mod: HCNC,CPTII,S$GLB, | Performed by: INTERNAL MEDICINE

## 2020-01-15 PROCEDURE — 99999 PR PBB SHADOW E&M-EST. PATIENT-LVL III: ICD-10-PCS | Mod: PBBFAC,HCNC,, | Performed by: INTERNAL MEDICINE

## 2020-01-15 PROCEDURE — 1159F PR MEDICATION LIST DOCUMENTED IN MEDICAL RECORD: ICD-10-PCS | Mod: HCNC,S$GLB,, | Performed by: INTERNAL MEDICINE

## 2020-01-15 PROCEDURE — 3078F DIAST BP <80 MM HG: CPT | Mod: HCNC,CPTII,S$GLB, | Performed by: INTERNAL MEDICINE

## 2020-01-15 PROCEDURE — 99999 PR PBB SHADOW E&M-EST. PATIENT-LVL III: CPT | Mod: PBBFAC,HCNC,, | Performed by: INTERNAL MEDICINE

## 2020-01-15 PROCEDURE — 1101F PR PT FALLS ASSESS DOC 0-1 FALLS W/OUT INJ PAST YR: ICD-10-PCS | Mod: HCNC,CPTII,S$GLB, | Performed by: INTERNAL MEDICINE

## 2020-01-15 PROCEDURE — 1159F MED LIST DOCD IN RCRD: CPT | Mod: HCNC,S$GLB,, | Performed by: INTERNAL MEDICINE

## 2020-01-15 PROCEDURE — 1101F PT FALLS ASSESS-DOCD LE1/YR: CPT | Mod: HCNC,CPTII,S$GLB, | Performed by: INTERNAL MEDICINE

## 2020-01-15 PROCEDURE — 99499 UNLISTED E&M SERVICE: CPT | Mod: HCNC,S$GLB,, | Performed by: INTERNAL MEDICINE

## 2020-01-15 PROCEDURE — 3074F PR MOST RECENT SYSTOLIC BLOOD PRESSURE < 130 MM HG: ICD-10-PCS | Mod: HCNC,CPTII,S$GLB, | Performed by: INTERNAL MEDICINE

## 2020-01-15 PROCEDURE — 99214 OFFICE O/P EST MOD 30 MIN: CPT | Mod: HCNC,S$GLB,, | Performed by: INTERNAL MEDICINE

## 2020-01-15 PROCEDURE — 99214 PR OFFICE/OUTPT VISIT, EST, LEVL IV, 30-39 MIN: ICD-10-PCS | Mod: HCNC,S$GLB,, | Performed by: INTERNAL MEDICINE

## 2020-01-15 RX ORDER — FUROSEMIDE 20 MG/1
20 TABLET ORAL DAILY PRN
Qty: 10 TABLET | Refills: 0 | Status: SHIPPED | OUTPATIENT
Start: 2020-01-15 | End: 2020-01-28

## 2020-01-15 NOTE — PROGRESS NOTES
Subjective:   Patient ID:  Lavelle Ladd is a 64 y.o. male who presents for cardiac consult of Shortness of Breath      HPI  The patient came in today for cardiac consult of Shortness of Breath    Lavelle Ladd is a 64 y.o. male pt with CAD s/p mult PCI, Pt has h/o CRI, DM, HTN, HLP, PAD, right BKA, transmetatarsal amputation of left foot, renal transplant in May 2016, COPD,  CAD and immunosuppressed post transplant here for CV follow up.      8/5/19 visit with Amol Alas, pt of Dr. Valdez  He presents to clinic today for hospital follow up. Presented to [facility] on 7/21/19 with acute anterolateral and inferior STEMI. Cath showed occluded apical LAD, diffuse non obstructive CAD elsewhere. Medically managed.  Patient hydrated with IVF the day after angiogram due to bump in creatine to 1.7. No ACEI ordered upon DC.     9/11/19  Pt is here for pre-op CV eval - scheduled for surgery (Skin excision) for skin cancer left temple on 9/27/19 with . Last [facility] 7/19 medically managed post STEMI. No CP/SOB.     12/11/19  He is s/p surgery and 20 rounds of radiation. He has been having more nausea, and has ear ache along with SOB and L arm pain. He has been having low blood sugars.   He has been getting wound care at home with PT/OT. He has constant dull type aching pain. He has taken pain meds, takes Norco.     1/15/20  Nuclear stress neg after last visit. Earlier this year was admitted for B/L PNA. He went to the ER about 10 days ago - evaluation of SOB with sleeping which onset gradually over the last 2 hours. Pt was evaluated at the ED 2 hours ago and discharged with Levalbuterol nebulizer solution and vistaril 25 mg. Pt reports that when he tried to fall asleep, he suddenly cannot breathe causing anxiety with sleep. Pt was referred to pulmonology earlier today. Symptoms are episodic and moderate in severity. Sleep increases SOB.    BNP was mildly elevated referred to pulm. He was given IV lasix and has diuresed. He  feels almost back to baseline. He also has PVD in arms will see vasc sx.     Patient feels no leg swelling, no PND, no palpitation, no dizziness, no syncope, no CNS symptoms.    Patient is compliant with medications.    Nuclear Quantitative Functional Analysis:   LVEF: 69 %    Impression: NORMAL MYOCARDIAL PERFUSION  1. The perfusion scan is free of evidence for myocardial ischemia or injury. The inferior wall is obscured by bowel.  2. Resting wall motion is physiologic.   3. Resting LV function is normal.   4. The ventricular volumes are normal at rest and stress.   5. The extracardiac distribution of radioactivity is normal.   6. When compared to the previous study from 2015, no ischecmic changes.     This document has been electronically    SIGNED BY: Michael Contreras MD On: 2019 16:02    CONCLUSIONS     1 - Normal left ventricular systolic function (EF 60-65%).     2 - Indeterminate LV diastolic function.     3 - Normal right ventricular systolic function .     4 - Wall motion abnormalities.     5 - Moderate left atrial enlargement.       This document has been electronically    SIGNED BY: Andrew Valdez MD On: 2019 11:21    19 [facility]  1. Routine post-cath care.  2. Recommend maximal medical management for apical LAD occlusion (small tortuous vessel and very distal occlusion).   3. Risk factor reductions.  4. ASA 81mg.  5. Clopidogrel (Plavix) for one year.  6. Maximize medical tx for PAD.    Past Medical History:   Diagnosis Date    DINORAH (acute kidney injury) 2016    Arthritis     CAD in native artery 2019    CHF (congestive heart failure)     Chronic obstructive pulmonary disease 2016    Coronary artery disease involving native coronary artery of native heart without angina pectoris 2016    CRI (chronic renal insufficiency) 2019     donor kidney transplant for DM 16     Induction with Thymo x3 and IV solumedrol to total 875mg  Kidney Biopsy  2016:  16 glomeruli, ACR type 1 AVR type 2, significant microcirculatory changes, c4d negative, No DSA, 5 to10% fibrosis. Treated with thymo x8 6/21/2016- no rejection      Diastolic heart failure     Encounter for blood transfusion     ESRD on RRT since 10/2013 10/29/2013    Biopsy proven diabetic nephropathy and lymphoplasmacytic interstitial infiltrate not c/w with AIN (ddx sjogrens or assoc with tamm-horsefall protein extravasation)     GERD (gastroesophageal reflux disease)     History of hepatitis C, s/p successful Rx w/ SVR12 - 4/2017 4/5/2017    Completed 12 weeks harvoni w/ SVR    Hyperlipidemia     Hypertension     PAD (peripheral artery disease) 7/21/2019    PIC line (peripherally inserted central catheter) flush     Prophylactic immunotherapy     Proteinuria     PVD (peripheral vascular disease) 6/26/2017    RLE BKA CT 12/11/16 Extensive atherosclerotic disease of the aorta and mesenteric arteries.     Renal hypertension     Type 2 diabetes mellitus with diabetic neuropathy, with long-term current use of insulin 12/1/2016    Vitamin B12 deficiency        Past Surgical History:   Procedure Laterality Date    AORTOGRAPHY WITH SERIALOGRAPHY N/A 6/14/2018    Procedure: LEFT LEG ANGIOGRAM;  Surgeon: Donal Mcdonald MD;  Location: [facility] CATH LAB;  Service: Vascular;  Laterality: N/A;    av bovine graft      Left UE    AV FISTULA PLACEMENT      left UE    CARDIAC CATHETERIZATION  02/2015    CLOSURE OF WOUND Left 9/24/2018    Procedure: CLOSURE, WOUND;  Surgeon: Karla Wheeler DPM;  Location: [facility] OR;  Service: Podiatry;  Laterality: Left;  Secondary Wound closure, extensive    CLOSURE OF WOUND Left 11/5/2018    Procedure: CLOSURE, WOUND;  Surgeon: Karla Wheeler DPM;  Location: [facility] OR;  Service: Podiatry;  Laterality: Left;    DEBRIDEMENT OF MULTIPLE METATARSAL BONES Left 11/5/2018    Procedure: DEBRIDEMENT, METATARSAL BONE, 2 OR MORE BONES;  Surgeon: Karla Wheeler DPM;  Location:  [facility] OR;  Service: Podiatry;  Laterality: Left;    EXCISION OF SKIN Left 9/27/2019    Procedure: EXCISION, SKIN;  Surgeon: Lenard Alarcon MD;  [address];  Service: Plastics;  Laterality: Left;  Plastics set, NIMS monitor, [employer]    FOOT AMPUTATION THROUGH METATARSAL Left 9/21/2018    Procedure: AMPUTATION, FOOT, TRANSMETATARSAL;  Surgeon: Karla Wheeler DPM;  Location: [facility] OR;  Service: Podiatry;  Laterality: Left;    FOOT AMPUTATION THROUGH METATARSAL Left 10/31/2018    Procedure: AMPUTATION, FOOT, TRANSMETATARSAL;  Surgeon: Karla Wheeler DPM;  Location: [facility] OR;  Service: Podiatry;  Laterality: Left;    FOOT AMPUTATION THROUGH METATARSAL Left 11/5/2018    Procedure: AMPUTATION, FOOT, TRANSMETATARSAL;  Surgeon: Karla Wheeler DPM;  Location: [facility] OR;  Service: Podiatry;  Laterality: Left;  revisional transmetatarsal amputation, Left foot    IRRIGATION AND DEBRIDEMENT OF LOWER EXTREMITY Left 10/31/2018    Procedure: IRRIGATION AND DEBRIDEMENT, LOWER EXTREMITY;  Surgeon: Karla Wheeler DPM;  Location: [facility] OR;  Service: Podiatry;  Laterality: Left;    KIDNEY TRANSPLANT  05/21/2016    LEFT HEART CATHETERIZATION Left 7/21/2019    Procedure: CATHETERIZATION, HEART, LEFT;  Surgeon: Andrew Valdez MD;  Location: [facility] CATH LAB;  Service: Cardiology;  Laterality: Left;    LEG AMPUTATION THROUGH KNEE  2011    right LE, started as nail puncture leading to diabetic ulcer    SKIN FULL THICKNESS GRAFT Left 10/7/2019    Procedure: APPLICATION, GRAFT, SKIN, FULL-THICKNESS;  Surgeon: Lenard Alarcon MD;  [facility];  Service: Plastics;  Laterality: Left;    SURGICAL REMOVAL OF LESION OF FACE Right 10/7/2019    Procedure: EXCISION, LESION, FACE;  Surgeon: Lenard Alarcon MD;  [address];  Service: Plastics;  Laterality: Right;       Social History     Tobacco Use    Smoking status: Former Smoker     Packs/day: 1.00     Years: 40.00     Pack years: 40.00      Last attempt to quit: 2013     Years since quittin.0    Smokeless tobacco: Never Used   Substance Use Topics    Alcohol use: Yes     Alcohol/week: 6.0 standard drinks     Types: 6 Cans of beer per week     Comment: seldom    Drug use: No       Family History   Problem Relation Age of Onset    Cancer Father     Diabetes Father     Heart failure Father     Stroke Father     Heart failure Mother     Kidney disease Neg Hx        Patient's Medications   New Prescriptions    FUROSEMIDE (LASIX) 20 MG TABLET    Take 1 tablet (20 mg total) by mouth daily as needed (Leg swelling).   Previous Medications    AMLODIPINE (NORVASC) 10 MG TABLET    Take 1 tablet (10 mg total) by mouth once daily.    ASPIRIN (ECOTRIN) 81 MG EC TABLET    Take 1 tablet (81 mg total) by mouth once daily.    ATORVASTATIN (LIPITOR) 80 MG TABLET    Take 1 tablet (80 mg total) by mouth once daily.    BD INSULIN SYRINGE ULTRA-FINE 1 ML 31 GAUGE X 5/16 SYRG    USE ONE AS DIRECTED FOUR TIMES DAILY WITH MEALS AND AT BEDTIME    BLOOD SUGAR DIAGNOSTIC STRP    1 each by Misc.(Non-Drug; Combo Route) route 3 (three) times daily.    BLOOD-GLUCOSE METER KIT    Use as instructed    CLOPIDOGREL (PLAVIX) 75 MG TABLET    Take 1 tablet (75 mg total) by mouth once daily.    DICLOFENAC (FLECTOR) 1.3 % PT12    Apply 1 packet topically once daily.    ERGOCALCIFEROL (ERGOCALCIFEROL) 50,000 UNIT CAP    Take 1 capsule (50,000 Units total) by mouth every 7 days. Take on     FLASH GLUCOSE SCANNING READER (FREESTYLE BRYAN 14 DAY READER) MISC    1 Device by Misc.(Non-Drug; Combo Route) route as needed.    FLASH GLUCOSE SENSOR (FREESTYLE BRYAN 14 DAY SENSOR) KIT    1 kit by Misc.(Non-Drug; Combo Route) route every 14 (fourteen) days.    FLUAD [id number], 65 YR UP,,PF, 45 MCG (15 MCG X 3)/0.5 ML SYRG        GABAPENTIN (NEURONTIN) 300 MG CAPSULE    Take 1 capsule (300 mg total) by mouth 3 (three) times daily. for 10 days    HYDROCODONE-ACETAMINOPHEN (NORCO)  " MG PER TABLET    1 tablet by Per G Tube route every 6 (six) hours as needed for Pain.    HYDROXYZINE PAMOATE (VISTARIL) 25 MG CAP    Take 1 capsule (25 mg total) by mouth every 8 (eight) hours as needed (anxiety).    IPRATROPIUM (ATROVENT) 0.02 % NEBULIZER SOLUTION    Take 2.5 mLs (500 mcg total) by nebulization 4 (four) times daily.    ISOSORBIDE MONONITRATE (IMDUR) 30 MG 24 HR TABLET    Take 2 tablets (60 mg total) by mouth once daily.    LEVALBUTEROL (XOPENEX) 1.25 MG/3 ML NEBULIZER SOLUTION    Take 3 mLs (1.25 mg total) by nebulization every 6 to 8 hours as needed for Wheezing or Shortness of Breath.    LIRAGLUTIDE 0.6 MG/0.1 ML, 18 MG/3 ML, SUBQ PNIJ (VICTOZA 2-KOKI) 0.6 MG/0.1 ML (18 MG/3 ML) PNIJ    Inject 1.8 mg into the skin once daily.    METHYLPHENIDATE HCL (RITALIN) 10 MG TABLET    Take 1 tablet (10 mg total) by mouth 2 (two) times daily with meals.    METOPROLOL TARTRATE (LOPRESSOR) 25 MG TABLET    Take 1 tablet (25 mg total) by mouth 2 (two) times daily.    MUPIROCIN (BACTROBAN) 2 % OINTMENT    Apply topically 3 (three) times daily.    MUPIROCIN CALCIUM 2% (BACTROBAN) 2 % CREAM    Apply topically 3 (three) times daily.    MYCOPHENOLATE (CELLCEPT) 500 MG TAB        NALOXONE (NARCAN) 4 MG/ACTUATION SPRY    1 spray by Nasal route once.    NITROGLYCERIN (NITROSTAT) 0.4 MG SL TABLET    Place 1 tablet (0.4 mg total) under the tongue every 5 (five) minutes as needed.    ONDANSETRON (ZOFRAN-ODT) 4 MG TBDL    Take 1 tablet (4 mg total) by mouth every 8 (eight) hours as needed.    PEN NEEDLE, DIABETIC (SURE-FINE PEN NEEDLES) 31 GAUGE X 3/16" NDLE    1 each by Misc.(Non-Drug; Combo Route) route 4 (four) times daily with meals and nightly.    PEN NEEDLE, DIABETIC 32 GAUGE X 5/32" NDLE    1 each by Misc.(Non-Drug; Combo Route) route 4 (four) times daily. Urszula [place]    PREDNISONE (DELTASONE) 5 MG TABLET    Take 1 tablet (5 mg total) by mouth once daily.    SEVELAMER CARBONATE (RENVELA) 800 MG TAB        " TACROLIMUS (PROGRAF) 0.5 MG CAP    Take 3 capsules (1.5 mg total) by mouth every 12 (twelve) hours.    TRESIBA FLEXTOUCH U-100 100 UNIT/ML (3 ML) INPN    Inject 30 Units into the skin 2 (two) times daily.    VIOS AEROSOL DELIVERY SYSTEM AURELIO    USE Q 4 H PRN   Modified Medications    No medications on file   Discontinued Medications    No medications on file       Review of Systems   Constitutional: Positive for malaise/fatigue.   HENT: Negative.    Eyes: Negative.    Respiratory: Positive for shortness of breath.    Cardiovascular: Negative for chest pain.   Gastrointestinal: Negative.    Genitourinary: Negative.    Musculoskeletal: Positive for myalgias and neck pain.   Skin: Negative.    Neurological: Negative.    Endo/Heme/Allergies: Negative.    Psychiatric/Behavioral: Negative.    All 12 systems otherwise negative.      Wt Readings from Last 3 Encounters:   01/15/20 102.1 kg (225 lb)   01/07/20 102.1 kg (225 lb)   01/04/20 102.1 kg (225 lb)     Temp Readings from Last 3 Encounters:   01/04/20 97.9 °F (36.6 °C) (Oral)   01/04/20 98 °F (36.7 °C) (Oral)   01/02/20 97.6 °F (36.4 °C) (Oral)     BP Readings from Last 3 Encounters:   01/15/20 92/62   01/07/20 132/86   01/04/20 138/77     Pulse Readings from Last 3 Encounters:   01/15/20 84   01/07/20 93   01/04/20 82       BP 92/62 (BP Location: Right arm, Patient Position: Sitting, BP Method: Medium (Manual))   Pulse 84   Wt 102.1 kg (225 lb)   BMI 31.38 kg/m²     Objective:   Physical Exam   Constitutional: He is oriented to person, place, and time. He appears well-developed and well-nourished. No distress.   HENT:   Head: Normocephalic and atraumatic.   Nose: Nose normal.   Mouth/Throat: Oropharynx is clear and moist.   Eyes: Conjunctivae and EOM are normal. No scleral icterus.   Neck: Normal range of motion. Neck supple. No JVD present. No thyromegaly present.   Cardiovascular: Normal rate, regular rhythm, S1 normal and S2 normal. Exam reveals no gallop, no  S3, no S4 and no friction rub.   Murmur heard.  Pulmonary/Chest: Effort normal and breath sounds normal. No stridor. No respiratory distress. He has no wheezes. He has no rales. He exhibits no tenderness.   Abdominal: Soft. Bowel sounds are normal. He exhibits no distension and no mass. There is no tenderness. There is no rebound.   Genitourinary:   Genitourinary Comments: Deferred   Musculoskeletal: Normal range of motion. He exhibits no edema, tenderness or deformity.   Lymphadenopathy:     He has no cervical adenopathy.   Neurological: He is alert and oriented to person, place, and time. He exhibits normal muscle tone. Coordination normal.   Skin: Skin is warm and dry. No rash noted. He is not diaphoretic. No erythema. No pallor.   Psychiatric: He has a normal mood and affect. His behavior is normal. Judgment and thought content normal.   Nursing note and vitals reviewed.      Lab Results   Component Value Date     01/04/2020    K 4.3 01/04/2020     01/04/2020    CO2 24 01/04/2020    BUN 20 01/04/2020    CREATININE 1.6 (H) 01/04/2020    GLU 96 01/04/2020    HGBA1C 7.1 (H) 12/31/2019    MG 1.9 01/02/2020    AST 51 (H) 01/04/2020    ALT 46 (H) 01/04/2020    ALBUMIN 3.8 01/04/2020    PROT 7.9 01/04/2020    BILITOT 0.5 01/04/2020    WBC 5.93 01/04/2020    HGB 13.8 (L) 01/04/2020    HCT 44.3 01/04/2020    MCV 92 01/04/2020     01/04/2020    INR 1.0 12/29/2019    TSH 0.909 07/21/2019    CHOL 123 07/21/2019    HDL 45 07/21/2019    LDLCALC 59.4 (L) 07/21/2019    TRIG 93 07/21/2019     (H) 01/04/2020     Assessment:      1. CAD in native artery    2. Old MI (myocardial infarction)    3. Coronary artery disease of native artery of native heart with stable angina pectoris    4. Essential hypertension    5. Renal hypertension    6. Chronic obstructive pulmonary disease, unspecified COPD type    7. Stage 3 chronic kidney disease    8. Peripheral vascular disease        Plan:     1. HTN  - cont  meds  - lasix 20mg for edema/swelling    2. CAD   - pharm nuclear stress test - neg  - cont asa, statin, bb, plavix, imdur  - PRN NTG    3.HLD  - cont statin    4. CKD/ renal transplant   - cont to monitor, f/u nephro    5. DM2  - cont meds per PCP    6. COPD  - cont tx per pulm    7. PVD  -cont meds  - f/u vasc sx    Follow up with Dr. Valdez or Amol Interiano    Thank you for allowing me to participate in this patient's care. Please do not hesitate to contact me with any questions or concerns. Consult note has been forwarded to the referral physician.     Michael Contreras MD, [facility]  Cardiovascular Disease  [facility] [place]  [phone] (P)

## 2020-01-18 ENCOUNTER — HOSPITAL ENCOUNTER (OUTPATIENT)
Dept: SLEEP MEDICINE | Facility: HOSPITAL | Age: 65
Discharge: HOME | End: 2020-01-18
Attending: INTERNAL MEDICINE
Payer: MEDICARE

## 2020-01-18 DIAGNOSIS — Z72.821 INADEQUATE SLEEP HYGIENE: ICD-10-CM

## 2020-01-18 DIAGNOSIS — G47.37 CENTRAL SLEEP APNEA SECONDARY TO CONGESTIVE HEART FAILURE (CHF): ICD-10-CM

## 2020-01-18 DIAGNOSIS — G47.31 CENTRAL SLEEP APNEA COMORBID WITH PRESCRIBED OPIOID USE: ICD-10-CM

## 2020-01-18 DIAGNOSIS — Z79.891 CENTRAL SLEEP APNEA COMORBID WITH PRESCRIBED OPIOID USE: ICD-10-CM

## 2020-01-18 DIAGNOSIS — F51.12 BEHAVIORALLY INDUCED INSUFFICIENT SLEEP SYNDROME: ICD-10-CM

## 2020-01-18 DIAGNOSIS — I50.9 CENTRAL SLEEP APNEA SECONDARY TO CONGESTIVE HEART FAILURE (CHF): ICD-10-CM

## 2020-01-18 DIAGNOSIS — G47.33 OBSTRUCTIVE SLEEP APNEA: Primary | ICD-10-CM

## 2020-01-18 PROCEDURE — 95810 POLYSOM 6/> YRS 4/> PARAM: CPT | Mod: 26,HCNC,, | Performed by: PSYCHOLOGIST

## 2020-01-18 PROCEDURE — 95810 PR POLYSOMNOGRAPHY, 4 OR MORE: ICD-10-PCS | Mod: 26,HCNC,, | Performed by: PSYCHOLOGIST

## 2020-01-18 PROCEDURE — 95810 POLYSOM 6/> YRS 4/> PARAM: CPT | Mod: HCNC

## 2020-01-23 NOTE — PROCEDURES
Patient Name: Lavelle Ladd  Date of Report: 20  Date of PS2020   [facility] No.: [id number]   : 1955                      Time of PS:40:28 PM - 4:47:20 AM  Sex:  Male   Age:  64   Weight:  225.0 lbs Height:  5  11          Type of PSG:  Diagnostic     REASONS FOR REFERRAL: Mr. Ladd is a 64 year old male, referred for diagnostic polysomnography by Dr. Colin Garcia, based on the patients reported snoring, observed respiratory pauses in sleep, and excessive daytime sleepiness.  Dr. Rishabh Esteban is the patients primary care physician.    STUDY PARAMETERS: This diagnostic study involved analysis of the patient's sleep pattern while breathing unassisted. The study was performed with a sleep technologist in attendance for the entire test period, with video monitoring throughout the study, and routine laboratory clinical parameters recorded:  NOTE: The polysomnography electrophysiological record for the patient has been reviewed in its entirety by Dr. Gutierrez.    SUMMARY STATEMENTS  DIAGNOSTIC IMPRESSIONS  G47.33  /  327.23  Obstructive Sleep Apnea, Adult (OSAHS)  G47.39  /  327.27  Severe Central Sleep Apnea Comorbid with Prescribed Opioid Use and  G47.37  /  428.0, 327.27 Central Sleep Apnea secondary to congestive heart failure (CHF)   F51.12   /  307.44  Mild Insufficient Sleep Syndrome  Z72.821 /  V69.4  Inadequate Sleep Hygiene  F51.04   /  307.42  r/o Psychophysiological Insomnia (stress - related and / or conditioned)    G47.01   / 327.01  r/o Insomnia due to Medical Condition (pain, discomfort)      PRIMARY TREATMENT RECOMMENDATIONS  Treat, or refer to Sleep Disorders Center.  1. The diagnostic polysomnography revealed a severe sleep apnea / hypopnea syndrome (A + H Index = 35.3 events / hr asleep with 8.6 respiratory event - related arousals / hr asleep for the study, and no RERAs (respiratory effort -  related arousals).  The mean SpO2 value was 92.5 %, moderate, minimum oxygen  saturation during sleep was 87.0 %, and waking baseline SpO2 was 98 %.  Sporadic, moderately loud snoring was noted.  CPAP titration polysomnography is recommended.            NOTE: The overall apnea-hypopnea index (AHI) was 35.3 events /hr asleep.  Mr. Ladd had 53 hypopneas  8 obstructive sleep apneas, 60 central sleep apneas, and 3 mixed sleep apneas.  There was a 1.8 hour long series of periodic central sleep apneas, in which Cheyne - Solomon breathing morphology was not evident.  Mr. Ladd has congestive heart failure and takes hydrocodone - acetaminophen / NORCO by prescription.    2. Weight loss to the normal range is recommended as it can decrease respiratory events and snoring in overweight patients.  3. The following changes in sleep hygiene / sleep - related behavior are recommended after medical treatments are successful   Set time for sleep to number of hours of sleep needed for adequate daytime functioning (7.5 to 9.0 hrs / night).    SECONDARY TREATMENT RECOMMENDATIONS  Treat, or refer to SDC if problems are not satisfactorily resolved by the above.  1. Follow - up inquiry regarding frequency, duration and nature of reported sleep loss and delayed sleep onset (r/o  stress - related and / or conditioned psychophysiological insomnia).  Please see SDI.  2. Follow - up inquiry regarding sleep disruption secondary to pain or other physical discomfort (please see SDI).    See below for a complete interpretation of data from the polysomnography and Sleep Disorders Inventory.     Thank you for referring this patient to the [facility].      Jose David Gutierrez, Ph.D., ABPP; Diplomate, American Board of Sleep Medicine

## 2020-01-24 ENCOUNTER — TELEPHONE (OUTPATIENT)
Dept: PULMONOLOGY | Facility: CLINIC | Age: 65
End: 2020-01-24

## 2020-01-24 ENCOUNTER — PATIENT MESSAGE (OUTPATIENT)
Dept: NEPHROLOGY | Facility: CLINIC | Age: 65
End: 2020-01-24

## 2020-01-24 ENCOUNTER — TELEPHONE (OUTPATIENT)
Dept: NEPHROLOGY | Facility: CLINIC | Age: 65
End: 2020-01-24

## 2020-01-24 ENCOUNTER — NURSE TRIAGE (OUTPATIENT)
Dept: ADMINISTRATIVE | Facility: CLINIC | Age: 65
End: 2020-01-24

## 2020-01-24 NOTE — TELEPHONE ENCOUNTER
Spoke with pt, Pt states he took Narcan and afterwards started feeling SOB advised pt to go to ER if symptoms worsen pt states 0 pain. Pt verbalized understanding.

## 2020-01-24 NOTE — PROGRESS NOTES
You have severe sleep apnea please let me know if he has gotten the CPAP machine from Dr. Garcia. He lives by himself and has been dangerously falling asleep and quitting breathing.  This has an urgent   Situation to expedite dispensing CPAP machine or BiPAP machine at this time.

## 2020-01-24 NOTE — TELEPHONE ENCOUNTER
----- Message from Colin Garcia MD sent at 1/24/2020  2:44 PM CST -----  .  Please call patient is a and schedule appointment as soon as possible.  Double book if needed.

## 2020-01-24 NOTE — TELEPHONE ENCOUNTER
"I called and spoke with pt. Informed that sleep study shows severe sleep apnea, pt. Complains of trouble breathing and says "I need some help". I have contacted Dr. Camacho office to see if CPAP treatment can be expedited. Will continue to follow up    "

## 2020-01-24 NOTE — TELEPHONE ENCOUNTER
Spoke to pt.  Pt states he feels worse with increased sob.  Advised pt to go to ER for eval and tx.

## 2020-01-24 NOTE — PROGRESS NOTES
"I called and spoke with pt. Informed that sleep study shows severe sleep apnea, pt. Complains of trouble breathing and says "I need some help". I have contacted Dr. Camacho office to see if CPAP treatment can be expedited. Will continue to follow up"

## 2020-01-24 NOTE — TELEPHONE ENCOUNTER
Reason for Disposition   MODERATE difficulty breathing (e.g., speaks in phrases, SOB even at rest, pulse 100-120) of new onset or worse than normal    Additional Information   Negative: Breathing stopped and hasn't returned   Negative: Choking on something   Negative: SEVERE difficulty breathing (e.g., struggling for each breath, speaks in single words, pulse > 120)   Negative: Bluish (or gray) lips or face   Negative: Difficult to awaken or acting confused (e.g., disoriented, slurred speech)   Negative: Passed out (i.e., fainted, collapsed and was not responding)   Negative: Wheezing started suddenly after medicine, an allergic food, or bee sting   Negative: Stridor   Negative: Slow, shallow and weak breathing   Negative: Sounds like a life-threatening emergency to the triager   Negative: Chest pain   Negative: Wheezing (high pitched whistling sound) and previous asthma attacks or use of asthma medicines   Negative: Difficulty breathing and only present when coughing   Negative: Difficulty breathing and only from stuffy or runny nose    Protocols used: BREATHING DIFFICULTY-A-OH

## 2020-01-24 NOTE — TELEPHONE ENCOUNTER
----- Message from Avel Estrada MD sent at 1/24/2020 10:39 AM CST -----  You have severe sleep apnea please let me know if he has gotten the CPAP machine from Dr. Garcia. He lives by himself and has been dangerously falling asleep and quitting breathing.  This has an urgent   Situation to expedite dispensing CPAP machine or BiPAP machine at this time.

## 2020-01-25 ENCOUNTER — TELEPHONE (OUTPATIENT)
Dept: NEPHROLOGY | Facility: CLINIC | Age: 65
End: 2020-01-25

## 2020-01-25 NOTE — TELEPHONE ENCOUNTER
Called to see if he has gotten cpap yet the patient says no but his insurance just called to let him know he was approved. He will call dr hill office on Monday am.1/25/2020/1026/sf

## 2020-01-27 ENCOUNTER — TELEPHONE (OUTPATIENT)
Dept: PULMONOLOGY | Facility: CLINIC | Age: 65
End: 2020-01-27

## 2020-01-27 NOTE — TELEPHONE ENCOUNTER
----- Message from Deanne Barnhart sent at 1/27/2020 10:29 AM CST -----  Patient needs call back rg cpap..[phone] (home)

## 2020-01-28 ENCOUNTER — OFFICE VISIT (OUTPATIENT)
Dept: PULMONOLOGY | Facility: CLINIC | Age: 65
End: 2020-01-28
Payer: MEDICARE

## 2020-01-28 ENCOUNTER — TELEPHONE (OUTPATIENT)
Dept: PULMONOLOGY | Facility: CLINIC | Age: 65
End: 2020-01-28

## 2020-01-28 VITALS
OXYGEN SATURATION: 98 % | HEIGHT: 71 IN | SYSTOLIC BLOOD PRESSURE: 132 MMHG | RESPIRATION RATE: 20 BRPM | BODY MASS INDEX: 31.12 KG/M2 | WEIGHT: 222.31 LBS | HEART RATE: 67 BPM | DIASTOLIC BLOOD PRESSURE: 68 MMHG

## 2020-01-28 DIAGNOSIS — Z79.899 SEVERE CENTRAL SLEEP APNEA COMORBID WITH PRESCRIBED OPIOID USE: ICD-10-CM

## 2020-01-28 DIAGNOSIS — G47.33 OSA (OBSTRUCTIVE SLEEP APNEA): ICD-10-CM

## 2020-01-28 DIAGNOSIS — G47.37 CENTRAL SLEEP APNEA SECONDARY TO CONGESTIVE HEART FAILURE (CHF): ICD-10-CM

## 2020-01-28 DIAGNOSIS — G47.33 OSA (OBSTRUCTIVE SLEEP APNEA): Primary | ICD-10-CM

## 2020-01-28 DIAGNOSIS — G47.31 SEVERE CENTRAL SLEEP APNEA COMORBID WITH PRESCRIBED OPIOID USE: Primary | ICD-10-CM

## 2020-01-28 DIAGNOSIS — G47.31 SEVERE CENTRAL SLEEP APNEA COMORBID WITH PRESCRIBED OPIOID USE: ICD-10-CM

## 2020-01-28 DIAGNOSIS — Z79.899 SEVERE CENTRAL SLEEP APNEA COMORBID WITH PRESCRIBED OPIOID USE: Primary | ICD-10-CM

## 2020-01-28 DIAGNOSIS — R60.0 LOCALIZED EDEMA: ICD-10-CM

## 2020-01-28 DIAGNOSIS — I50.9 CENTRAL SLEEP APNEA SECONDARY TO CONGESTIVE HEART FAILURE (CHF): ICD-10-CM

## 2020-01-28 PROCEDURE — 3075F SYST BP GE 130 - 139MM HG: CPT | Mod: HCNC,CPTII,S$GLB, | Performed by: INTERNAL MEDICINE

## 2020-01-28 PROCEDURE — 3078F DIAST BP <80 MM HG: CPT | Mod: HCNC,CPTII,S$GLB, | Performed by: INTERNAL MEDICINE

## 2020-01-28 PROCEDURE — 99215 OFFICE O/P EST HI 40 MIN: CPT | Mod: HCNC,S$GLB,, | Performed by: INTERNAL MEDICINE

## 2020-01-28 PROCEDURE — 1159F PR MEDICATION LIST DOCUMENTED IN MEDICAL RECORD: ICD-10-PCS | Mod: HCNC,S$GLB,, | Performed by: INTERNAL MEDICINE

## 2020-01-28 PROCEDURE — 3078F PR MOST RECENT DIASTOLIC BLOOD PRESSURE < 80 MM HG: ICD-10-PCS | Mod: HCNC,CPTII,S$GLB, | Performed by: INTERNAL MEDICINE

## 2020-01-28 PROCEDURE — 99999 PR PBB SHADOW E&M-EST. PATIENT-LVL V: CPT | Mod: PBBFAC,HCNC,, | Performed by: INTERNAL MEDICINE

## 2020-01-28 PROCEDURE — 1100F PR PT FALLS ASSESS DOC 2+ FALLS/FALL W/INJURY/YR: ICD-10-PCS | Mod: HCNC,CPTII,S$GLB, | Performed by: INTERNAL MEDICINE

## 2020-01-28 PROCEDURE — 3288F PR FALLS RISK ASSESSMENT DOCUMENTED: ICD-10-PCS | Mod: HCNC,CPTII,S$GLB, | Performed by: INTERNAL MEDICINE

## 2020-01-28 PROCEDURE — 1159F MED LIST DOCD IN RCRD: CPT | Mod: HCNC,S$GLB,, | Performed by: INTERNAL MEDICINE

## 2020-01-28 PROCEDURE — 3288F FALL RISK ASSESSMENT DOCD: CPT | Mod: HCNC,CPTII,S$GLB, | Performed by: INTERNAL MEDICINE

## 2020-01-28 PROCEDURE — 1100F PTFALLS ASSESS-DOCD GE2>/YR: CPT | Mod: HCNC,CPTII,S$GLB, | Performed by: INTERNAL MEDICINE

## 2020-01-28 PROCEDURE — 3075F PR MOST RECENT SYSTOLIC BLOOD PRESS GE 130-139MM HG: ICD-10-PCS | Mod: HCNC,CPTII,S$GLB, | Performed by: INTERNAL MEDICINE

## 2020-01-28 PROCEDURE — 99999 PR PBB SHADOW E&M-EST. PATIENT-LVL V: ICD-10-PCS | Mod: PBBFAC,HCNC,, | Performed by: INTERNAL MEDICINE

## 2020-01-28 PROCEDURE — 99215 PR OFFICE/OUTPT VISIT, EST, LEVL V, 40-54 MIN: ICD-10-PCS | Mod: HCNC,S$GLB,, | Performed by: INTERNAL MEDICINE

## 2020-01-28 RX ORDER — FUROSEMIDE 20 MG/1
20 TABLET ORAL DAILY PRN
Qty: 30 TABLET | Refills: 11 | Status: SHIPPED | OUTPATIENT
Start: 2020-01-28 | End: 2020-04-22 | Stop reason: SDUPTHER

## 2020-01-28 NOTE — LETTER
January 28, 2020      Rishabh Esteban MD  [address] [facility]  [address]  [facility] [address]           [facility]  [address] [place] [id number]  Phone: [phone]  Fax: [phone]          Patient: Lavelle Ladd   MR Number: [id number]   YOB: 1955   Date of Visit: 1/28/2020         Thank you for referring Lavelle Ladd to me for evaluation. Attached you will find relevant portions of my assessment and plan of care.    If you have questions, please do not hesitate to call me. I look forward to following Lavelle Ladd along with you.    Sincerely,    Colin Garcia MD    Enclosure  CC:  Lamar Guadalupe MD    IIf you would like to receive this communication electronically, please contact [email or web address] or [phone] to request [employer] [employer] access.    [facility] [employer] is a tool which provides read-only access to select patient information with whom you have a relationship. Its easy to use and provides real time access to review your patients record including encounter summaries, notes, results, and demographic information.    If you feel you have received this communication in error or would no longer like to receive these types of communications, please e-mail [email or web address]

## 2020-01-28 NOTE — TELEPHONE ENCOUNTER
----- Message from Grecia Alvarado sent at 1/28/2020  3:58 PM CST -----  Contact: self [phone]  States that he is calling to speak to nurse regarding what he can do to help with his breathing until he can get the equipment that was ordered. Please call back at [phone]//thank you acc

## 2020-01-28 NOTE — TELEPHONE ENCOUNTER
Gave pt phone # to [facility] customer service.  Advised pt to go to ER for eval or hold narcotics if concerned with sob per Dr. Garcia.

## 2020-01-28 NOTE — PROGRESS NOTES
Subjective:       Patient ID: Lavelle Ladd is a 64 y.o. male.    Chief Complaint: He f/u for polysomnogram  - demonstrated central sleep apnea     Sleep Apnea (REV SLEEP STUDY) and Shortness of Breath    Shortness of Breath   Associated symptoms include rhinorrhea. Pertinent negatives include no abdominal pain, chest pain, fever, leg swelling, rash or sputum production.        Dyspnea  64 y.o. male patient with a PMHx of DM, PVD, renal HTN, PAD, GERD, diastolic heart failure, CRI, CAD, CHF, DINORAH, COPD, and arthritis who presents tfor evaluation of SOB with sleeping which onset gradually over the last few days. Complains of shortness of breath. Symptoms occur while supine only, while getting dressed. Symptoms began 3 months ago, gradually worsening since. Associated symptoms include  chest pain, located left chest, dry cough, dyspnea on exertion, dyspnea when laying down, shortness of breath and tightness in chest. He denies productive cough. He does not have had recent travel. Weight has been stable. Symptoms are exacerbated by any exercise. Symptoms are alleviated by rest.      Hx MI in 10/2019      Sleep Apnea  He presents for a sleep evaluation. He complains of snoring, periods of not breathing, decreased concentration, excessive daytime sleepiness, falling asleep while watching television, feels sleepy during the day, take naps during the day.  Symptoms began 2 years ago, gradually worsening since that time.  He goes to sleep at 10- 2 or 3 in the am weekdays and  weekends. He awakens 5 weekdays and 7 weekends. He falls asleep in 30 minutes.  Collar size na. He denies knees buckling with laughing, completely or partially paralyzed while falling asleep or waking up. Previous evaluation and treatment has included polysomnogram     COPD  He presents for evaluation and treatment of COPD. The patient is  currently have symptoms / an exacerbation. The patient has COPD for approximately 10 years. Symptoms in previous  episodes have included dyspnea, and typically last 2 weeks. Previous episodes have been exacerbated by moderate activity and yardwork. Current treatment includes albuterol inhaler, which generally provides some relief of symptoms.      He uses 1 pillows at night. Patient currently is not on home oxygen therapy.. The patient is having no constitutional symptoms, denying fever, chills, anorexia, or weight loss. The patient has not been hospitalized for this condition before. He quit smoking approximately 6 years ago. The patient is experiencing exercise intolerance (difficulty walking 3 blocks on flat ground).     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  Toxic Fume Exposure: multiple times - pneumonia from chlorine      Past Medical History:   Diagnosis Date    DINORAH (acute kidney injury) 2016    Arthritis     CAD in native artery 2019    CHF (congestive heart failure)     Chronic obstructive pulmonary disease 2016    Coronary artery disease involving native coronary artery of native heart without angina pectoris 2016    CRI (chronic renal insufficiency) 2019     donor kidney transplant for DM 16     Induction with Thymo x3 and IV solumedrol to total 875mg  Kidney Biopsy  2016: 16 glomeruli, ACR type 1 AVR type 2, significant microcirculatory changes, c4d negative, No DSA, 5 to10% fibrosis. Treated with thymo x8 2016- no rejection      Diastolic heart failure     Encounter for blood transfusion     ESRD on RRT since 10/2013 10/29/2013    Biopsy proven diabetic nephropathy and lymphoplasmacytic interstitial infiltrate not c/w with AIN (ddx sjogrens or assoc with tamm-horsefall protein extravasation)     GERD (gastroesophageal reflux disease)      History of hepatitis C, s/p successful Rx w/ SVR12 - 4/2017 4/5/2017    Completed 12 weeks harvoni w/ SVR    Hyperlipidemia     Hypertension     PAD (peripheral artery disease) 7/21/2019    PIC line (peripherally inserted central catheter) flush     Prophylactic immunotherapy     Proteinuria     PVD (peripheral vascular disease) 6/26/2017    RLE BKA CT 12/11/16 Extensive atherosclerotic disease of the aorta and mesenteric arteries.     Renal hypertension     Type 2 diabetes mellitus with diabetic neuropathy, with long-term current use of insulin 12/1/2016    Vitamin B12 deficiency      Past Surgical History:   Procedure Laterality Date    AORTOGRAPHY WITH SERIALOGRAPHY N/A 6/14/2018    Procedure: LEFT LEG ANGIOGRAM;  Surgeon: Donal Mcdonald MD;  Location: [facility] CATH LAB;  Service: Vascular;  Laterality: N/A;    av bovine graft      Left UE    AV FISTULA PLACEMENT      left UE    CARDIAC CATHETERIZATION  02/2015    CLOSURE OF WOUND Left 9/24/2018    Procedure: CLOSURE, WOUND;  Surgeon: Karla Wheeler DPM;  Location: [facility] OR;  Service: Podiatry;  Laterality: Left;  Secondary Wound closure, extensive    CLOSURE OF WOUND Left 11/5/2018    Procedure: CLOSURE, WOUND;  Surgeon: Karla Wheeler DPM;  Location: [facility] OR;  Service: Podiatry;  Laterality: Left;    DEBRIDEMENT OF MULTIPLE METATARSAL BONES Left 11/5/2018    Procedure: DEBRIDEMENT, METATARSAL BONE, 2 OR MORE BONES;  Surgeon: Karla Wheeler DPM;  Location: [facility] OR;  Service: Podiatry;  Laterality: Left;    EXCISION OF SKIN Left 9/27/2019    Procedure: EXCISION, SKIN;  Surgeon: Lenard Alarcon MD;  Location: [address];  Service: Plastics;  Laterality: Left;  Plastics set, NIMS monitor, [employer]    FOOT AMPUTATION THROUGH METATARSAL Left 9/21/2018    Procedure: AMPUTATION, FOOT, TRANSMETATARSAL;  Surgeon: Karla Wheeler DPM;  Location: [facility] OR;  Service: Podiatry;  Laterality: Left;    FOOT AMPUTATION THROUGH METATARSAL Left  10/31/2018    Procedure: AMPUTATION, FOOT, TRANSMETATARSAL;  Surgeon: Karla Wheeler DPM;  Location: [facility] OR;  Service: Podiatry;  Laterality: Left;    FOOT AMPUTATION THROUGH METATARSAL Left 11/5/2018    Procedure: AMPUTATION, FOOT, TRANSMETATARSAL;  Surgeon: Karla Wheeler DPM;  Location: [facility] OR;  Service: Podiatry;  Laterality: Left;  revisional transmetatarsal amputation, Left foot    IRRIGATION AND DEBRIDEMENT OF LOWER EXTREMITY Left 10/31/2018    Procedure: IRRIGATION AND DEBRIDEMENT, LOWER EXTREMITY;  Surgeon: Karla Wheeler DPM;  Location: [facility] OR;  Service: Podiatry;  Laterality: Left;    KIDNEY TRANSPLANT  05/21/2016    LEFT HEART CATHETERIZATION Left 7/21/2019    Procedure: CATHETERIZATION, HEART, LEFT;  Surgeon: Andrew Valdez MD;  Location: [facility] CATH LAB;  Service: Cardiology;  Laterality: Left;    LEG AMPUTATION THROUGH KNEE  2011    right LE, started as nail puncture leading to diabetic ulcer    SKIN FULL THICKNESS GRAFT Left 10/7/2019    Procedure: APPLICATION, GRAFT, SKIN, FULL-THICKNESS;  Surgeon: Lenard Alarcon MD;  Location: [facility] OR [address] [place] [address];  Service: Plastics;  Laterality: Left;    SURGICAL REMOVAL OF LESION OF FACE Right 10/7/2019    Procedure: EXCISION, LESION, FACE;  Surgeon: Lenard Alarcon MD;  [facility];  Service: Plastics;  Laterality: Right;     Social History     Socioeconomic History    Marital status: Single     Spouse name: Not on file    Number of children: Not on file    Years of education: Not on file    Highest education level: Not on file   Occupational History    Occupation: Disabled     Employer: DISABLED   Social Needs    Financial resource strain: Not on file    Food insecurity:     Worry: Not on file     Inability: Not on file    Transportation needs:     Medical: Not on file     Non-medical: Not on file   Tobacco Use    Smoking status: Former Smoker     Packs/day: 1.00     Years: 40.00     Pack years: 40.00     Last attempt  "to quit: 2013     Years since quittin.0    Smokeless tobacco: Never Used   Substance and Sexual Activity    Alcohol use: Yes     Alcohol/week: 6.0 standard drinks     Types: 6 Cans of beer per week     Comment: seldom    Drug use: No    Sexual activity: Never   Lifestyle    Physical activity:     Days per week: Not on file     Minutes per session: Not on file    Stress: Not on file   Relationships    Social connections:     Talks on phone: Not on file     Gets together: Not on file     Attends [employer] service: Not on file     Active member of club or organization: Not on file     Attends meetings of clubs or organizations: Not on file     Relationship status: Not on file   Other Topics Concern    Not on file   Social History Narrative    Lives alone. Retired from construction and various jobs, now retired. Would like to return to work PT to alleviate boredom.     Review of Systems   Constitutional: Positive for fatigue. Negative for fever.   HENT: Positive for postnasal drip, rhinorrhea and congestion.    Eyes: Negative for redness and itching.   Respiratory: Positive for apnea, cough, shortness of breath, dyspnea on extertion, use of rescue inhaler and Paroxysmal Nocturnal Dyspnea. Negative for sputum production.    Cardiovascular: Negative for chest pain, palpitations and leg swelling.        Claudication in legs   Genitourinary: Negative for difficulty urinating and hematuria.   Endocrine: Negative for cold intolerance and heat intolerance.    Musculoskeletal: Positive for arthralgias.   Skin: Negative for rash.   Gastrointestinal: Negative for nausea and abdominal pain.   Neurological: Negative for dizziness, syncope, weakness and light-headedness.   Hematological: Negative for adenopathy. Does not bruise/bleed easily.   Psychiatric/Behavioral: Positive for sleep disturbance. The patient is not nervous/anxious.        Objective:      /68   Pulse 67   Resp 20   Ht 5' 11" (1.803 m)   " Wt 100.8 kg (222 lb 5.3 oz)   SpO2 98%   BMI 31.01 kg/m²   Physical Exam   Constitutional: He is oriented to person, place, and time. He appears well-developed and well-nourished.   HENT:   Head: Normocephalic and atraumatic.   Eyes: Pupils are equal, round, and reactive to light. Conjunctivae are normal.   Neck: Neck supple. No JVD present. No tracheal deviation present. No thyromegaly present.   Cardiovascular: Normal rate and regular rhythm. Exam reveals decreased pulses.   No murmur heard.  Pulmonary/Chest: He has decreased breath sounds. He has wheezes in the right lower field and the left lower field. He has no rhonchi. He has rales.   Abdominal: Soft. Bowel sounds are normal.   Musculoskeletal: He exhibits edema. He exhibits no tenderness.   Artificial right leg, edema left leg   Lymphadenopathy:     He has no cervical adenopathy.   Neurological: He is alert and oriented to person, place, and time.   Skin: Skin is warm and dry.   Nursing note and vitals reviewed.    Personal Diagnostic Review    Jose David Gutierrez, PhD Signed Jose David Gutierrez, PhD 2020  5:50 PM 2020  5:49 PM          Assoc. Orders Procedures   POLYSOMNOGRAM POLYSOMNOGRAM       Pre-Op Dx Post-Op Dx   Obstructive sleep apnea None             Patient Name: Lavelle Ladd                       Date of Report: 20           Date of PS2020   [facility] No.: [id number]                          : 1955                      Time of PS:40:28 PM - 4:47:20 AM  Sex:  Male   Age:  64   Weight:  225.0 lbs      Height:  5  11                         Type of PSG:  Diagnostic      REASONS FOR REFERRAL: Mr. Ladd is a 64 year old male, referred for diagnostic polysomnography by Dr. Colin Garcia, based on the patients reported snoring, observed respiratory pauses in sleep, and excessive daytime sleepiness.  Dr. Rishabh Esteban is the patients primary care physician.     STUDY PARAMETERS: This diagnostic study involved analysis  of the patient's sleep pattern while breathing unassisted. The study was performed with a sleep technologist in attendance for the entire test period, with video monitoring throughout the study, and routine laboratory clinical parameters recorded:  NOTE: The polysomnography electrophysiological record for the patient has been reviewed in its entirety by Dr. Gutierrez.     SUMMARY STATEMENTS  DIAGNOSTIC IMPRESSIONS  G47.33  /  327.23                    Obstructive Sleep Apnea, Adult (OSAHS)  G47.39  /  327.27                    Severe Central Sleep Apnea Comorbid with Prescribed Opioid Use and  G47.37  /  428.0, 327.27         Central Sleep Apnea secondary to congestive heart failure (CHF)   F51.12   /  307.44                    Mild Insufficient Sleep Syndrome  Z72.821 /  V69.4                     Inadequate Sleep Hygiene  F51.04   /  307.42                    r/o Psychophysiological Insomnia (stress - related and / or conditioned)    G47.01   / 327.01                    r/o Insomnia due to Medical Condition (pain, discomfort)      PRIMARY TREATMENT RECOMMENDATIONS  Treat, or refer to Sleep Disorders Center.  1. The diagnostic polysomnography revealed a severe sleep apnea / hypopnea syndrome (A + H Index = 35.3 events / hr asleep with 8.6 respiratory event - related arousals / hr asleep for the study, and no RERAs (respiratory effort -  related arousals).  The mean SpO2 value was 92.5 %, moderate, minimum oxygen saturation during sleep was 87.0 %, and waking baseline SpO2 was 98 %.  Sporadic, moderately loud snoring was noted.  CPAP titration polysomnography is recommended.            NOTE: The overall apnea-hypopnea index (AHI) was 35.3 events /hr asleep.  Mr. Ladd had 53 hypopneas  8 obstructive sleep apneas, 60 central sleep apneas, and 3 mixed sleep apneas.  There was a 1.8 hour long series of periodic central sleep apneas, in which Cheyne - Solomon breathing morphology was not evident.  Mr. Ladd has congestive  heart failure and takes hydrocodone - acetaminophen / NORCO by prescription.    2. Weight loss to the normal range is recommended as it can decrease respiratory events and snoring in overweight patients.  3. The following changes in sleep hygiene / sleep - related behavior are recommended after medical treatments are successful  · Set time for sleep to number of hours of sleep needed for adequate daytime functioning (7.5 to 9.0 hrs / night).     SECONDARY TREATMENT RECOMMENDATIONS  Treat, or refer to [facility] if problems are not satisfactorily resolved by the above.  1. Follow - up inquiry regarding frequency, duration and nature of reported sleep loss and delayed sleep onset (r/o  stress - related and / or conditioned psychophysiological insomnia).  Please see SDI.  2. Follow - up inquiry regarding sleep disruption secondary to pain or other physical discomfort (please see SDI).     See below for a complete interpretation of data from the polysomnography and Sleep Disorders Inventory.     Thank you for referring this patient to the [facility].      Jose David Gutierrez, Ph.D., ABPP; Diplomate, American Board of Sleep Medicine        Chest x-ray: hyperinflation    X-Ray Chest AP Portable  Narrative: EXAMINATION:  XR CHEST AP PORTABLE    CLINICAL HISTORY:  Sepsis;    FINDINGS:  Comparison study 12/30/2019.  Normal size heart.  Aortic arch calcification no congestion.  Lungs are clear.  Impression: Negative chest x-ray.    Electronically signed by: Charbel Laguerre MD  Date:    01/04/2020  Time:    07:22      Office Spirometry Results:     No flowsheet data found.  Pulmonary Studies Review 1/28/2020   SpO2 98   Height 71.000   Weight 3557.34   BMI (Calculated) 31   Predicted Distance 355.05   Predicted Distance Meters (Calculated) 547.06         Assessment:       Central sleep apnea secondary to congestive heart failure (CHF)  Start patient on BiPAP with a backup rate    MARIA INES (obstructive sleep apnea)  -      BIPAP FOR HOME USE  -     CPAP/BIPAP SUPPLIES    Severe central sleep apnea comorbid with prescribed opioid use  -     BIPAP FOR HOME USE  -     CPAP/BIPAP SUPPLIES    Localized edema  -     furosemide (LASIX) 20 MG tablet; Take 1 tablet (20 mg total) by mouth daily as needed (Leg swelling, Nocturnal SOB).  Dispense: 30 tablet; Refill: 11    Central sleep apnea secondary to congestive heart failure (CHF)          Outpatient Encounter Medications as of 1/28/2020   Medication Sig Dispense Refill    atorvastatin (LIPITOR) 80 MG tablet Take 1 tablet (80 mg total) by mouth once daily. 30 tablet 11    BD INSULIN SYRINGE ULTRA-FINE 1 mL 31 gauge x 5/16 Syrg USE ONE AS DIRECTED FOUR TIMES DAILY WITH MEALS AND AT BEDTIME 120 each 10    blood sugar diagnostic Strp 1 each by Misc.(Non-Drug; Combo Route) route 3 (three) times daily. 100 each 11    blood-glucose meter kit Use as instructed 1 each 0    clopidogrel (PLAVIX) 75 mg tablet Take 1 tablet (75 mg total) by mouth once daily. 30 tablet 11    diclofenac (FLECTOR) 1.3 % PT12 Apply 1 packet topically once daily.      ergocalciferol (ERGOCALCIFEROL) 50,000 unit Cap Take 1 capsule (50,000 Units total) by mouth every 7 days. Take on Mondays 4 capsule 11    flash glucose scanning reader (FREESTYLE BRYAN 14 DAY READER) Misc 1 Device by Misc.(Non-Drug; Combo Route) route as needed. 1 each 0    flash glucose sensor (FREESTYLE BRYAN 14 DAY SENSOR) Kit 1 kit by Misc.(Non-Drug; Combo Route) route every 14 (fourteen) days. 2 kit 11    FLUAD [id number], 65 YR UP,,PF, 45 mcg (15 mcg x 3)/0.5 mL Syrg       furosemide (LASIX) 20 MG tablet Take 1 tablet (20 mg total) by mouth daily as needed (Leg swelling, Nocturnal SOB). 30 tablet 11    HYDROcodone-acetaminophen (NORCO)  mg per tablet 1 tablet by Per G Tube route every 6 (six) hours as needed for Pain. 15 tablet 0    hydrOXYzine pamoate (VISTARIL) 25 MG Cap Take 1 capsule (25 mg total) by mouth every 8 (eight) hours as  "needed (anxiety). 20 capsule 0    ipratropium (ATROVENT) 0.02 % nebulizer solution Take 2.5 mLs (500 mcg total) by nebulization 4 (four) times daily. 120 vial 11    isosorbide mononitrate (IMDUR) 30 MG 24 hr tablet Take 2 tablets (60 mg total) by mouth once daily. 60 tablet 11    levalbuterol (XOPENEX) 1.25 mg/3 mL nebulizer solution Take 3 mLs (1.25 mg total) by nebulization every 6 to 8 hours as needed for Wheezing or Shortness of Breath. 288 mL 11    liraglutide 0.6 mg/0.1 mL, 18 mg/3 mL, subq PNIJ (VICTOZA 2-KOKI) 0.6 mg/0.1 mL (18 mg/3 mL) PnIj Inject 1.8 mg into the skin once daily. 9 mL 11    methylphenidate HCl (RITALIN) 10 MG tablet Take 1 tablet (10 mg total) by mouth 2 (two) times daily with meals. 30 tablet 0    metoprolol tartrate (LOPRESSOR) 25 MG tablet Take 1 tablet (25 mg total) by mouth 2 (two) times daily. 60 tablet 11    mupirocin (BACTROBAN) 2 % ointment Apply topically 3 (three) times daily. 30 g 1    mupirocin calcium 2% (BACTROBAN) 2 % cream Apply topically 3 (three) times daily. 30 g 1    mycophenolate (CELLCEPT) 500 mg Tab       naloxone (NARCAN) 4 mg/actuation Spry 1 spray by Nasal route once.      nitroGLYCERIN (NITROSTAT) 0.4 MG SL tablet Place 1 tablet (0.4 mg total) under the tongue every 5 (five) minutes as needed. 30 tablet 0    ondansetron (ZOFRAN-ODT) 4 MG TbDL Take 1 tablet (4 mg total) by mouth every 8 (eight) hours as needed. 21 tablet 1    pen needle, diabetic 32 gauge x 5/32" Ndle 1 each by Misc.(Non-Drug; Combo Route) route 4 (four) times daily. Urszula Needles 200 each 11    predniSONE (DELTASONE) 5 MG tablet Take 1 tablet (5 mg total) by mouth once daily. 30 tablet 11    sevelamer carbonate (RENVELA) 800 mg Tab       tacrolimus (PROGRAF) 0.5 MG Cap Take 3 capsules (1.5 mg total) by mouth every 12 (twelve) hours. 180 capsule 11    TRESIBA FLEXTOUCH U-100 100 unit/mL (3 mL) InPn Inject 30 Units into the skin 2 (two) times daily.      VIOS AEROSOL DELIVERY SYSTEM " [place] USE Q 4 H PRN      [DISCONTINUED] furosemide (LASIX) 20 MG tablet Take 1 tablet (20 mg total) by mouth daily as needed (Leg swelling). 10 tablet 0    amLODIPine (NORVASC) 10 MG tablet Take 1 tablet (10 mg total) by mouth once daily. 90 tablet 3    aspirin (ECOTRIN) 81 MG EC tablet Take 1 tablet (81 mg total) by mouth once daily.  0    gabapentin (NEURONTIN) 300 MG capsule Take 1 capsule (300 mg total) by mouth 3 (three) times daily. for 10 days 30 capsule 0     No facility-administered encounter medications on file as of 1/28/2020.      Plan:       Requested Prescriptions     Signed Prescriptions Disp Refills    furosemide (LASIX) 20 MG tablet 30 tablet 11     Sig: Take 1 tablet (20 mg total) by mouth daily as needed (Leg swelling, Nocturnal SOB).     Problem List Items Addressed This Visit     Central sleep apnea secondary to congestive heart failure (CHF)    Overview     Most recent sleep study did not show evidence of Cheyne-Solomon respirations.  However the patient did have central sleep apnea which was thought to be due to his opioid use         Current Assessment & Plan     Start patient on BiPAP with a backup rate         MARIA INES (obstructive sleep apnea) - Primary    Relevant Orders    BIPAP FOR HOME USE    CPAP/BIPAP SUPPLIES    Severe central sleep apnea comorbid with prescribed opioid use    Relevant Orders    BIPAP FOR HOME USE    CPAP/BIPAP SUPPLIES      Other Visit Diagnoses     Localized edema        Relevant Medications    furosemide (LASIX) 20 MG tablet          Central sleep apnea secondary to congestive heart failure (CHF)  Start patient on BiPAP with a backup rate           Follow up in about 6 weeks (around 3/10/2020) for CPAP initial download - bring CPAP machine.    MEDICAL DECISION MAKING: Moderate to high complexity.  Overall, the multiple problems listed are of moderate to high severity that may impact quality of life and activities of daily living. Side effects of medications,  treatment plan as well as options and alternatives reviewed and discussed with patient. There was counseling of patient concerning these issues.    Total time spent in face to face counseling and coordination of care - 45  minutes over 50% of time was used in discussion of prognosis, risks, benefits of treatment, instructions and compliance with regimen . Discussion with other physicians or health care providers (DME, NP, pharmacy, respiratory therapy) occurred.

## 2020-01-29 ENCOUNTER — TELEPHONE (OUTPATIENT)
Dept: PULMONOLOGY | Facility: CLINIC | Age: 65
End: 2020-01-29

## 2020-01-29 PROBLEM — G47.31: Status: ACTIVE | Noted: 2020-01-29

## 2020-01-29 PROBLEM — Z79.899: Status: ACTIVE | Noted: 2020-01-29

## 2020-01-29 NOTE — TELEPHONE ENCOUNTER
----- Message from Gregor Nick LPN sent at 1/29/2020  1:42 PM CST -----  DME states pt needs a titration study in order to get bipap.  Can you write order?  Pt asking what he can do in the interim.    ----- Message -----  From: Jhon Samson  Sent: 1/29/2020  11:52 AM CST  To: Jose MENDEZ Staff    ..Type:  Patient Returning Call    Who Called:pt   Who Left Message for Patient:  Does the patient know what this is regarding? By pap machine   Would the patient rather a call back or a response via [employer]? Call back   Best Call Back Number: [phone]  Additional Information: pt is requesting a call back to discus the delivery of by pap machine. Pt states he will need additional  test

## 2020-01-29 NOTE — TELEPHONE ENCOUNTER
Called patient, discussed with durable medical equipment ,  needs BiPAP titration study with backup rate

## 2020-01-29 NOTE — TELEPHONE ENCOUNTER
----- Message from Jhon Samson sent at 1/29/2020  9:30 AM CST -----  ..Type:  Patient Returning Call    Who Called:pt   Who Left Message for Patient:  Does the patient know what this is regarding?  By pap machine   Would the patient rather a call back or a response via [facility]? Call back   Best Call Back Number: [phone]  Additional Information: pt is requesting a call from nurse to check on status of by pap machine

## 2020-01-29 NOTE — TELEPHONE ENCOUNTER
Referred pt to [facility] for questions concerning cpap machine.

## 2020-01-29 NOTE — TELEPHONE ENCOUNTER
Subjective:       Patient ID: Lavelle Ladd is a 64 y.o. male.    Chief Complaint: He       No chief complaint on file.    HPI     Dyspnea  64 y.o. male patient with a PMHx of DM, PVD, renal HTN, PAD, GERD, diastolic heart failure, CRI, CAD, CHF, DINORAH, COPD, and arthritis who presents tfor evaluation of SOB with sleeping which onset gradually over the last few days. Complains of shortness of breath. Symptoms occur while supine only, while getting dressed. Symptoms began 3 months ago, gradually worsening since. Associated symptoms include  chest pain, located left chest, dry cough, dyspnea on exertion, dyspnea when laying down, shortness of breath and tightness in chest. He denies productive cough. He does not have had recent travel. Weight has been stable. Symptoms are exacerbated by any exercise. Symptoms are alleviated by rest.      Hx MI in 10/2019      Sleep Apnea  He presents for a sleep evaluation. He complains of snoring, periods of not breathing, decreased concentration, excessive daytime sleepiness, falling asleep while watching television, feels sleepy during the day, take naps during the day.  Symptoms began 2 years ago, gradually worsening since that time.  He goes to sleep at 10- 2 or 3 in the am weekdays and  weekends. He awakens 5 weekdays and 7 weekends. He falls asleep in 30 minutes.  Collar size na. He denies knees buckling with laughing, completely or partially paralyzed while falling asleep or waking up. Previous evaluation and treatment has included none.    COPD  He presents for evaluation and treatment of COPD. The patient is  currently have symptoms / an exacerbation. The patient has COPD for approximately 10 years. Symptoms in previous episodes have included dyspnea, and typically last 2 weeks. Previous episodes have been exacerbated by moderate activity and yardwork. Current treatment includes albuterol inhaler, which generally provides some relief of symptoms.      He uses 1 pillows at  night. Patient currently is not on home oxygen therapy.. The patient is having no constitutional symptoms, denying fever, chills, anorexia, or weight loss. The patient has not been hospitalized for this condition before. He quit smoking approximately 6 years ago. The patient is experiencing exercise intolerance (difficulty walking 3 blocks on flat ground).     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  Toxic Fume Exposure: multiple times - pneumonia from chlorine      Past Medical History:   Diagnosis Date    DINORAH (acute kidney injury) 2016    Arthritis     CAD in native artery 2019    CHF (congestive heart failure)     Chronic obstructive pulmonary disease 2016    Coronary artery disease involving native coronary artery of native heart without angina pectoris 2016    CRI (chronic renal insufficiency) 2019     donor kidney transplant for DM 16     Induction with Thymo x3 and IV solumedrol to total 875mg  Kidney Biopsy  2016: 16 glomeruli, ACR type 1 AVR type 2, significant microcirculatory changes, c4d negative, No DSA, 5 to10% fibrosis. Treated with thymo x8 2016- no rejection      Diastolic heart failure     Encounter for blood transfusion     ESRD on RRT since 10/2013 10/29/2013    Biopsy proven diabetic nephropathy and lymphoplasmacytic interstitial infiltrate not c/w with AIN (ddx sjogrens or assoc with tamm-horsefall protein extravasation)     GERD (gastroesophageal reflux disease)     History of hepatitis C, s/p successful Rx w/ SVR12 - 2017    Completed 12 weeks harvoni w/ SVR    Hyperlipidemia     Hypertension     PAD (peripheral artery disease) 2019    PIC line (peripherally inserted central catheter) flush      Prophylactic immunotherapy     Proteinuria     PVD (peripheral vascular disease) 6/26/2017    RLE BKA CT 12/11/16 Extensive atherosclerotic disease of the aorta and mesenteric arteries.     Renal hypertension     Type 2 diabetes mellitus with diabetic neuropathy, with long-term current use of insulin 12/1/2016    Vitamin B12 deficiency      Past Surgical History:   Procedure Laterality Date    AORTOGRAPHY WITH SERIALOGRAPHY N/A 6/14/2018    Procedure: LEFT LEG ANGIOGRAM;  Surgeon: Donal Mcdonald MD;  Location: [facility] CATH LAB;  Service: Vascular;  Laterality: N/A;    av bovine graft      Left UE    AV FISTULA PLACEMENT      left UE    CARDIAC CATHETERIZATION  02/2015    CLOSURE OF WOUND Left 9/24/2018    Procedure: CLOSURE, WOUND;  Surgeon: Karla Wheeler DPM;  Location: [facility] OR;  Service: Podiatry;  Laterality: Left;  Secondary Wound closure, extensive    CLOSURE OF WOUND Left 11/5/2018    Procedure: CLOSURE, WOUND;  Surgeon: Karla Wheeler DPM;  Location: [facility] OR;  Service: Podiatry;  Laterality: Left;    DEBRIDEMENT OF MULTIPLE METATARSAL BONES Left 11/5/2018    Procedure: DEBRIDEMENT, METATARSAL BONE, 2 OR MORE BONES;  Surgeon: Karla Wheeler DPM;  Location: [facility] OR;  Service: Podiatry;  Laterality: Left;    EXCISION OF SKIN Left 9/27/2019    Procedure: EXCISION, SKIN;  Surgeon: Lenard Alarcon MD;  Location: [address];  Service: Plastics;  Laterality: Left;  Plastics set, NIMS monitor, [employer]    FOOT AMPUTATION THROUGH METATARSAL Left 9/21/2018    Procedure: AMPUTATION, FOOT, TRANSMETATARSAL;  Surgeon: Karla Wheeler DPM;  Location: [facility] OR;  Service: Podiatry;  Laterality: Left;    FOOT AMPUTATION THROUGH METATARSAL Left 10/31/2018    Procedure: AMPUTATION, FOOT, TRANSMETATARSAL;  Surgeon: Karla Wheeler DPM;  Location: [facility] OR;  Service: Podiatry;  Laterality: Left;    FOOT AMPUTATION THROUGH METATARSAL Left 11/5/2018    Procedure: AMPUTATION, FOOT, TRANSMETATARSAL;   Surgeon: Karla Wheeler DPM;  Location: [facility] OR;  Service: Podiatry;  Laterality: Left;  revisional transmetatarsal amputation, Left foot    IRRIGATION AND DEBRIDEMENT OF LOWER EXTREMITY Left 10/31/2018    Procedure: IRRIGATION AND DEBRIDEMENT, LOWER EXTREMITY;  Surgeon: Karla Wheeler DPM;  Location: [facility] OR;  Service: Podiatry;  Laterality: Left;    KIDNEY TRANSPLANT  2016    LEFT HEART CATHETERIZATION Left 2019    Procedure: CATHETERIZATION, HEART, LEFT;  Surgeon: Andrew Valdez MD;  Location: [facility] CATH LAB;  Service: Cardiology;  Laterality: Left;    LEG AMPUTATION THROUGH KNEE      right LE, started as nail puncture leading to diabetic ulcer    SKIN FULL THICKNESS GRAFT Left 10/7/2019    Procedure: APPLICATION, GRAFT, SKIN, FULL-THICKNESS;  Surgeon: Lenard Alarcon MD;  [facility];  Service: Plastics;  Laterality: Left;    SURGICAL REMOVAL OF LESION OF FACE Right 10/7/2019    Procedure: EXCISION, LESION, FACE;  Surgeon: Lenard Alarcon MD;  [facility];  Service: Plastics;  Laterality: Right;     Social History     Socioeconomic History    Marital status: Single     Spouse name: Not on file    Number of children: Not on file    Years of education: Not on file    Highest education level: Not on file   Occupational History    Occupation: Disabled     Employer: DISABLED   Social Needs    Financial resource strain: Not on file    Food insecurity:     Worry: Not on file     Inability: Not on file    Transportation needs:     Medical: Not on file     Non-medical: Not on file   Tobacco Use    Smoking status: Former Smoker     Packs/day: 1.00     Years: 40.00     Pack years: 40.00     Last attempt to quit: 2013     Years since quittin.0    Smokeless tobacco: Never Used   Substance and Sexual Activity    Alcohol use: Yes     Alcohol/week: 6.0 standard drinks     Types: 6 Cans of beer per week     Comment: seldom    Drug use: No    Sexual  "activity: Never   Lifestyle    Physical activity:     Days per week: Not on file     Minutes per session: Not on file    Stress: Not on file   Relationships    Social connections:     Talks on phone: Not on file     Gets together: Not on file     Attends Gnosticism service: Not on file     Active member of club or organization: Not on file     Attends meetings of clubs or organizations: Not on file     Relationship status: Not on file   Other Topics Concern    Not on file   Social History Narrative    Lives alone. Retired from construction and various jobs, now retired. Would like to return to work PT to alleviate boredom.     Review of Systems   Constitutional: Positive for fatigue. Negative for fever.   HENT: Positive for postnasal drip, rhinorrhea and congestion.    Eyes: Negative for redness and itching.   Respiratory: Positive for cough, sputum production, shortness of breath, dyspnea on extertion, use of rescue inhaler and Paroxysmal Nocturnal Dyspnea.    Cardiovascular: Negative for chest pain, palpitations and leg swelling.   Genitourinary: Negative for difficulty urinating and hematuria.   Endocrine: Negative for cold intolerance and heat intolerance.    Skin: Negative for rash.   Gastrointestinal: Negative for nausea and abdominal pain.   Neurological: Negative for dizziness, syncope, weakness and light-headedness.   Hematological: Negative for adenopathy. Does not bruise/bleed easily.   Psychiatric/Behavioral: Negative for sleep disturbance. The patient is not nervous/anxious.        Objective:      /86   Pulse 93   Resp 20   Ht 5' 11" (1.803 m)   Wt 102.1 kg (225 lb)   SpO2 99%   BMI 31.38 kg/m²   Physical Exam   Constitutional: He is oriented to person, place, and time. He appears well-developed and well-nourished.   HENT:   Head: Normocephalic and atraumatic.   Eyes: Pupils are equal, round, and reactive to light. Conjunctivae are normal.   Neck: Neck supple. No JVD present. No tracheal " deviation present. No thyromegaly present.   Cardiovascular: Normal rate and regular rhythm. Exam reveals decreased pulses.   No murmur heard.  Pulmonary/Chest: He has decreased breath sounds. He has wheezes in the right lower field and the left lower field. He has no rhonchi. He has no rales.   Abdominal: Soft. Bowel sounds are normal.   Musculoskeletal: He exhibits no edema or tenderness.   Artificial right leg   Lymphadenopathy:     He has no cervical adenopathy.   Neurological: He is alert and oriented to person, place, and time.   Skin: Skin is warm and dry.   Nursing note and vitals reviewed.    Personal Diagnostic Review  Chest x-ray: hyperinflation    X-Ray Chest AP Portable  Narrative: EXAMINATION:  XR CHEST AP PORTABLE    CLINICAL HISTORY:  Sepsis;    FINDINGS:  Comparison study 12/30/2019.  Normal size heart.  Aortic arch calcification no congestion.  Lungs are clear.  Impression: Negative chest x-ray.    Electronically signed by: Charbel Laguerre MD  Date:    01/04/2020  Time:    07:22      Office Spirometry Results:     No flowsheet data found.  Pulmonary Studies Review 1/28/2020   SpO2 98   Height 71.000   Weight 3557.34   BMI (Calculated) 31   Predicted Distance 355.05   Predicted Distance Meters (Calculated) 547.06         Assessment:            Severe central sleep apnea comorbid with prescribed opioid use  -     CPAP Titration (Must have dx of MARIA INES from previous sleep study.); Future; Expected date: 01/29/2020    MARIA INES (obstructive sleep apnea)  -     CPAP Titration (Must have dx of MARIA INES from previous sleep study.); Future; Expected date: 01/29/2020          Outpatient Encounter Medications as of 1/28/2020   Medication Sig Dispense Refill    amLODIPine (NORVASC) 10 MG tablet Take 1 tablet (10 mg total) by mouth once daily. 90 tablet 3    aspirin (ECOTRIN) 81 MG EC tablet Take 1 tablet (81 mg total) by mouth once daily.  0    atorvastatin (LIPITOR) 80 MG tablet Take 1 tablet (80 mg total) by mouth once  daily. 30 tablet 11    BD INSULIN SYRINGE ULTRA-FINE 1 mL 31 gauge x 5/16 Syrg USE ONE AS DIRECTED FOUR TIMES DAILY WITH MEALS AND AT BEDTIME 120 each 10    blood sugar diagnostic Strp 1 each by Misc.(Non-Drug; Combo Route) route 3 (three) times daily. 100 each 11    blood-glucose meter kit Use as instructed 1 each 0    clopidogrel (PLAVIX) 75 mg tablet Take 1 tablet (75 mg total) by mouth once daily. 30 tablet 11    diclofenac (FLECTOR) 1.3 % PT12 Apply 1 packet topically once daily.      ergocalciferol (ERGOCALCIFEROL) 50,000 unit Cap Take 1 capsule (50,000 Units total) by mouth every 7 days. Take on Mondays 4 capsule 11    flash glucose scanning reader (FREESTYLE BRYAN 14 DAY READER) Misc 1 Device by Misc.(Non-Drug; Combo Route) route as needed. 1 each 0    flash glucose sensor (FREESTYLE BRYAN 14 DAY SENSOR) Kit 1 kit by Misc.(Non-Drug; Combo Route) route every 14 (fourteen) days. 2 kit 11    FLUAD 6280-2203, 65 YR UP,,PF, 45 mcg (15 mcg x 3)/0.5 mL Syrg       furosemide (LASIX) 20 MG tablet Take 1 tablet (20 mg total) by mouth daily as needed (Leg swelling, Nocturnal SOB). 30 tablet 11    gabapentin (NEURONTIN) 300 MG capsule Take 1 capsule (300 mg total) by mouth 3 (three) times daily. for 10 days 30 capsule 0    HYDROcodone-acetaminophen (NORCO)  mg per tablet 1 tablet by Per G Tube route every 6 (six) hours as needed for Pain. 15 tablet 0    hydrOXYzine pamoate (VISTARIL) 25 MG Cap Take 1 capsule (25 mg total) by mouth every 8 (eight) hours as needed (anxiety). 20 capsule 0    ipratropium (ATROVENT) 0.02 % nebulizer solution Take 2.5 mLs (500 mcg total) by nebulization 4 (four) times daily. 120 vial 11    isosorbide mononitrate (IMDUR) 30 MG 24 hr tablet Take 2 tablets (60 mg total) by mouth once daily. 60 tablet 11    levalbuterol (XOPENEX) 1.25 mg/3 mL nebulizer solution Take 3 mLs (1.25 mg total) by nebulization every 6 to 8 hours as needed for Wheezing or Shortness of Breath. 288 mL  "11    liraglutide 0.6 mg/0.1 mL, 18 mg/3 mL, subq PNIJ (VICTOZA 2-KOKI) 0.6 mg/0.1 mL (18 mg/3 mL) PnIj Inject 1.8 mg into the skin once daily. 9 mL 11    methylphenidate HCl (RITALIN) 10 MG tablet Take 1 tablet (10 mg total) by mouth 2 (two) times daily with meals. 30 tablet 0    metoprolol tartrate (LOPRESSOR) 25 MG tablet Take 1 tablet (25 mg total) by mouth 2 (two) times daily. 60 tablet 11    mupirocin (BACTROBAN) 2 % ointment Apply topically 3 (three) times daily. 30 g 1    mupirocin calcium 2% (BACTROBAN) 2 % cream Apply topically 3 (three) times daily. 30 g 1    mycophenolate (CELLCEPT) 500 mg Tab       naloxone (NARCAN) 4 mg/actuation Spry 1 spray by Nasal route once.      nitroGLYCERIN (NITROSTAT) 0.4 MG SL tablet Place 1 tablet (0.4 mg total) under the tongue every 5 (five) minutes as needed. 30 tablet 0    ondansetron (ZOFRAN-ODT) 4 MG TbDL Take 1 tablet (4 mg total) by mouth every 8 (eight) hours as needed. 21 tablet 1    pen needle, diabetic 32 gauge x 5/32" Ndle 1 each by Misc.(Non-Drug; Combo Route) route 4 (four) times daily. Urszula Needles 200 each 11    predniSONE (DELTASONE) 5 MG tablet Take 1 tablet (5 mg total) by mouth once daily. 30 tablet 11    sevelamer carbonate (RENVELA) 800 mg Tab       tacrolimus (PROGRAF) 0.5 MG Cap Take 3 capsules (1.5 mg total) by mouth every 12 (twelve) hours. 180 capsule 11    TRESIBA FLEXTOUCH U-100 100 unit/mL (3 mL) InPn Inject 30 Units into the skin 2 (two) times daily.      VIOS AEROSOL DELIVERY SYSTEM [place] USE Q 4 H PRN      [DISCONTINUED] furosemide (LASIX) 20 MG tablet Take 1 tablet (20 mg total) by mouth daily as needed (Leg swelling). 10 tablet 0     No facility-administered encounter medications on file as of 1/28/2020.      Plan:       Requested Prescriptions      No prescriptions requested or ordered in this encounter     Problem List Items Addressed This Visit     MARIA INES (obstructive sleep apnea)    Relevant Orders    CPAP Titration (Must " have dx of MARIA INES from previous sleep study.)    Severe central sleep apnea comorbid with prescribed opioid use - Primary    Relevant Orders    CPAP Titration (Must have dx of MARIA INES from previous sleep study.)      64 y.o. male patient with a PMHx of DM, PVD, renal HTN, PAD, GERD, diastolic heart failure, CRI, CAD, CHF, DINORAH, COPD, and arthritis who presents to the Emergency Department for evaluation of SOB with sleeping which onset gradually over the last 2 hours.       No follow-ups on file.    MEDICAL DECISION MAKING: Moderate to high complexity.  Overall, the multiple problems listed are of moderate to high severity that may impact quality of life and activities of daily living. Side effects of medications, treatment plan as well as options and alternatives reviewed and discussed with patient. There was counseling of patient concerning these issues.    Total time spent in face to face counseling and coordination of care - 45  minutes over 50% of time was used in discussion of prognosis, risks, benefits of treatment, instructions and compliance with regimen . Discussion with other physicians or health care providers (DME, NP, pharmacy, respiratory therapy) occurred.

## 2020-02-04 ENCOUNTER — OFFICE VISIT (OUTPATIENT)
Dept: DERMATOLOGY | Facility: CLINIC | Age: 65
End: 2020-02-04
Payer: MEDICARE

## 2020-02-04 DIAGNOSIS — L57.0 ACTINIC KERATOSES: Primary | ICD-10-CM

## 2020-02-04 PROCEDURE — 1101F PT FALLS ASSESS-DOCD LE1/YR: CPT | Mod: HCNC,CPTII,S$GLB, | Performed by: STUDENT IN AN ORGANIZED HEALTH CARE EDUCATION/TRAINING PROGRAM

## 2020-02-04 PROCEDURE — 1126F AMNT PAIN NOTED NONE PRSNT: CPT | Mod: HCNC,S$GLB,, | Performed by: STUDENT IN AN ORGANIZED HEALTH CARE EDUCATION/TRAINING PROGRAM

## 2020-02-04 PROCEDURE — 99213 OFFICE O/P EST LOW 20 MIN: CPT | Mod: HCNC,S$GLB,, | Performed by: STUDENT IN AN ORGANIZED HEALTH CARE EDUCATION/TRAINING PROGRAM

## 2020-02-04 PROCEDURE — 1159F MED LIST DOCD IN RCRD: CPT | Mod: HCNC,S$GLB,, | Performed by: STUDENT IN AN ORGANIZED HEALTH CARE EDUCATION/TRAINING PROGRAM

## 2020-02-04 PROCEDURE — 99213 PR OFFICE/OUTPT VISIT, EST, LEVL III, 20-29 MIN: ICD-10-PCS | Mod: HCNC,S$GLB,, | Performed by: STUDENT IN AN ORGANIZED HEALTH CARE EDUCATION/TRAINING PROGRAM

## 2020-02-04 PROCEDURE — 99999 PR PBB SHADOW E&M-EST. PATIENT-LVL II: CPT | Mod: PBBFAC,HCNC,, | Performed by: STUDENT IN AN ORGANIZED HEALTH CARE EDUCATION/TRAINING PROGRAM

## 2020-02-04 PROCEDURE — 1101F PR PT FALLS ASSESS DOC 0-1 FALLS W/OUT INJ PAST YR: ICD-10-PCS | Mod: HCNC,CPTII,S$GLB, | Performed by: STUDENT IN AN ORGANIZED HEALTH CARE EDUCATION/TRAINING PROGRAM

## 2020-02-04 PROCEDURE — 1126F PR PAIN SEVERITY QUANTIFIED, NO PAIN PRESENT: ICD-10-PCS | Mod: HCNC,S$GLB,, | Performed by: STUDENT IN AN ORGANIZED HEALTH CARE EDUCATION/TRAINING PROGRAM

## 2020-02-04 PROCEDURE — 99999 PR PBB SHADOW E&M-EST. PATIENT-LVL II: ICD-10-PCS | Mod: PBBFAC,HCNC,, | Performed by: STUDENT IN AN ORGANIZED HEALTH CARE EDUCATION/TRAINING PROGRAM

## 2020-02-04 PROCEDURE — 1159F PR MEDICATION LIST DOCUMENTED IN MEDICAL RECORD: ICD-10-PCS | Mod: HCNC,S$GLB,, | Performed by: STUDENT IN AN ORGANIZED HEALTH CARE EDUCATION/TRAINING PROGRAM

## 2020-02-04 RX ORDER — FLUOROURACIL 50 MG/G
CREAM TOPICAL 2 TIMES DAILY
Qty: 40 G | Refills: 2 | Status: SHIPPED | OUTPATIENT
Start: 2020-02-04 | End: 2022-01-01

## 2020-02-04 RX ORDER — GABAPENTIN 300 MG/1
300 CAPSULE ORAL 3 TIMES DAILY
COMMUNITY
Start: 2020-02-03

## 2020-02-04 NOTE — PATIENT INSTRUCTIONS
Fluorouracil, 5-FU skin cream or solution  What is this medicine?  FLUOROURACIL, 5-FU (flure oh YOOR a anel) is a chemotherapy agent. It is used on the skin to treat skin cancer and skin conditions that could become cancer.  How should I use this medicine?  This medicine is only for use on the skin. Follow the directions on the prescription label. Wash hands before and after use. Wash affected area and gently pat dry. To apply this medicine use a cotton-tipped applicator, or use gloves if applying with fingertips. If applied with unprotected fingertips, it is very important to wash your hands well after you apply this medicine. Avoid applying to the eyes, nose, or mouth. Apply enough medicine to cover the affected area. You can cover the area with a light gauze dressing, but do not use tight or air-tight dressings. Finish the full course prescribed by your doctor or health care professional, even if you think your condition is better. Do not stop taking except on the advice of your doctor or health care professional.  Talk to your pediatrician regarding the use of this medicine in children. Special care may be needed.  What side effects may I notice from receiving this medicine?  Side effects that you should report to your doctor or health care professional as soon as possible:  · severe redness and swelling of normal skin  Side effects that usually do not require medical attention (report to your doctor or health care professional if they continue or are bothersome):  · dark colored skin  · eye irritation including burning, itching, sensitivity, stinging, or watering  · sensitivity to light  · skin irritation including burning, itching, pain, redness, scarring, or swelling of the affected area  · eye irritation including burning, itching, sensitivity, stinging, or watering  · increased sensitivity of the skin to sun and ultraviolet light  · pain and burning, redness, irritation of the affected area  · skin rash,  itching of the affected area  · sensitivity to light  · tenderness  What may interact with this medicine?  Interactions are not expected. Do not use any other skin products without telling your doctor or health care professional.  What if I miss a dose?  If you miss a dose, apply it as soon as you can. If it is almost time for your next dose, only use that dose. Do not apply extra doses. Contact your doctor or health care professional if you miss more than one dose.  Where should I keep my medicine?  Keep out of the reach of children.  Store at room temperature between 15 and 30 degrees C (59 and 86 degrees F). Do not freeze. Keep container tightly closed. Throw away any unused medicine after the expiration date.  What should I tell my health care provider before I take this medicine?  They need to know if you have any of these conditions:  · DPD enzyme deficiency  · recent or current radiation therapy  · swelling or open sores at the treatment site  · an unusual or allergic reaction to fluorouracil, other chemotherapy, other medicines, foods, dyes, or preservatives  · pregnant or trying to get pregnant  · breast-feeding  What should I watch for while using this medicine?  Visit your doctor or health care professional for checks on your progress. You will need to use this medicine for 2 to 6 weeks. This may be longer depending on the condition being treated. You may not see full healing for another 1 to 2 months after you stop using the medicine.  Treated areas of skin can look unsightly during and for several weeks after treatment with this medicine.  Do not get this medicine in your eyes. If you do, rinse out with plenty of cool tap water.  This medicine can make you more sensitive to the sun. Keep out of the sun. If you cannot avoid being in the sun, wear protective clothing and use sunscreen. Do not use sun lamps or tanning beds/booths.  NOTE:This sheet is a summary. It may not cover all possible information. If  you have questions about this medicine, talk to your doctor, pharmacist, or health care provider. Copyright© 2017 [employer]

## 2020-02-04 NOTE — PROGRESS NOTES
Subjective:       Patient ID:  Lavelle Ladd is a 64 y.o. male who presents for   Chief Complaint   Patient presents with    skin lesion     History of Present Illness: The patient presents for follow up of skin check. This is a high risk patient here to check for the development of new lesions. 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 Last seen on 12/3/19. Complains of several red scaly lesions on the forehead, scalp, and temples. Had similar lesions at last visit treated with cryotherapy. Surgical site well healed today. Denies any rapidly growing, bleeding or nonhealing skin lesions.       Review of Systems   Skin: Negative for rash.        Objective:    Physical Exam   Constitutional: He appears well-developed and well-nourished. No distress.   Neurological: He is alert and oriented to person, place, and time. He is not disoriented.   Psychiatric: He has a normal mood and affect.   Skin:   Areas Examined (abnormalities noted in diagram):   Scalp / Hair Palpated and Inspected  Head / Face Inspection Performed  Neck Inspection Performed              Diagram Legend     Erythematous scaling macule/papule c/w actinic keratosis       Vascular papule c/w angioma      Pigmented verrucoid papule/plaque c/w seborrheic keratosis      Yellow umbilicated papule c/w sebaceous hyperplasia      Irregularly shaped tan macule c/w lentigo     1-2 mm smooth white papules consistent with Milia      Movable subcutaneous cyst with punctum c/w epidermal inclusion cyst      Subcutaneous movable cyst c/w pilar cyst      Firm pink to brown papule c/w dermatofibroma      Pedunculated fleshy papule(s) c/w skin tag(s)      Evenly pigmented macule c/w junctional nevus     Mildly variegated pigmented, slightly irregular-bordered macule c/w mildly atypical nevus      Flesh colored to evenly pigmented papule c/w intradermal nevus       Pink pearly  papule/plaque c/w basal cell carcinoma      Erythematous hyperkeratotic cursted plaque c/w SCC      Surgical scar with no sign of skin cancer recurrence      Open and closed comedones      Inflammatory papules and pustules      Verrucoid papule consistent consistent with wart     Erythematous eczematous patches and plaques     Dystrophic onycholytic nail with subungual debris c/w onychomycosis     Umbilicated papule    Erythematous-base heme-crusted tan verrucoid plaque consistent with inflamed seborrheic keratosis     Erythematous Silvery Scaling Plaque c/w Psoriasis     See annotation      Assessment / Plan:        Actinic keratoses - patient declined treatment with cryotherapy. Discussed treatment options, including efudex.   -     fluorouracil (EFUDEX) 5 % cream; Apply topically 2 (two) times daily. For 3 weeks. Will cause redness and irritation.  Dispense: 40 g; Refill: 2  - skin will likely become red, crusted, sore, and tender during the treatment usually starting around day 3 and continuing for about 1 week after last application. skin may take up to 6 weeks to return to its normal coloring.           Follow up in about 8 weeks (around 3/31/2020).

## 2020-02-05 ENCOUNTER — EXTERNAL HOME HEALTH (OUTPATIENT)
Dept: HOME HEALTH SERVICES | Facility: HOSPITAL | Age: 65
End: 2020-02-05
Payer: MEDICARE

## 2020-02-05 PROCEDURE — G0179 PR HOME HEALTH MD RECERTIFICATION: ICD-10-PCS | Mod: ,,, | Performed by: OTOLARYNGOLOGY

## 2020-02-05 PROCEDURE — G0179 MD RECERTIFICATION HHA PT: HCPCS | Mod: ,,, | Performed by: OTOLARYNGOLOGY

## 2020-02-08 ENCOUNTER — HOSPITAL ENCOUNTER (OUTPATIENT)
Dept: SLEEP MEDICINE | Facility: HOSPITAL | Age: 65
Discharge: HOME | End: 2020-02-08
Attending: INTERNAL MEDICINE
Payer: MEDICARE

## 2020-02-08 DIAGNOSIS — F51.12 BEHAVIORALLY INDUCED INSUFFICIENT SLEEP SYNDROME: ICD-10-CM

## 2020-02-08 DIAGNOSIS — G47.37 CENTRAL SLEEP APNEA SECONDARY TO CONGESTIVE HEART FAILURE (CHF): ICD-10-CM

## 2020-02-08 DIAGNOSIS — I50.9 CENTRAL SLEEP APNEA SECONDARY TO CONGESTIVE HEART FAILURE (CHF): ICD-10-CM

## 2020-02-08 DIAGNOSIS — G47.61 PERIODIC LIMB MOVEMENT DISORDER (PLMD): ICD-10-CM

## 2020-02-08 DIAGNOSIS — Z79.899 SEVERE CENTRAL SLEEP APNEA COMORBID WITH PRESCRIBED OPIOID USE: ICD-10-CM

## 2020-02-08 DIAGNOSIS — G47.33 OBSTRUCTIVE SLEEP APNEA: Primary | ICD-10-CM

## 2020-02-08 DIAGNOSIS — G47.31 SEVERE CENTRAL SLEEP APNEA COMORBID WITH PRESCRIBED OPIOID USE: ICD-10-CM

## 2020-02-08 DIAGNOSIS — Z72.821 INADEQUATE SLEEP HYGIENE: ICD-10-CM

## 2020-02-08 PROCEDURE — 95811 PR POLYSOMNOGRAPHY W/CPAP: ICD-10-PCS | Mod: 26,HCNC,, | Performed by: PSYCHOLOGIST

## 2020-02-08 PROCEDURE — 95811 POLYSOM 6/>YRS CPAP 4/> PARM: CPT | Mod: 26,HCNC,, | Performed by: PSYCHOLOGIST

## 2020-02-08 PROCEDURE — 95811 POLYSOM 6/>YRS CPAP 4/> PARM: CPT | Mod: HCNC

## 2020-02-14 ENCOUNTER — OFFICE VISIT (OUTPATIENT)
Dept: OTOLARYNGOLOGY | Facility: CLINIC | Age: 65
End: 2020-02-14
Payer: MEDICARE

## 2020-02-14 ENCOUNTER — CLINICAL SUPPORT (OUTPATIENT)
Dept: AUDIOLOGY | Facility: CLINIC | Age: 65
End: 2020-02-14
Payer: MEDICARE

## 2020-02-14 VITALS
BODY MASS INDEX: 30.96 KG/M2 | HEART RATE: 98 BPM | DIASTOLIC BLOOD PRESSURE: 84 MMHG | SYSTOLIC BLOOD PRESSURE: 138 MMHG | WEIGHT: 222 LBS

## 2020-02-14 DIAGNOSIS — H61.23 BILATERAL IMPACTED CERUMEN: Primary | ICD-10-CM

## 2020-02-14 DIAGNOSIS — G47.31 SEVERE CENTRAL SLEEP APNEA COMORBID WITH PRESCRIBED OPIOID USE: ICD-10-CM

## 2020-02-14 DIAGNOSIS — D84.9 IMMUNOSUPPRESSION: ICD-10-CM

## 2020-02-14 DIAGNOSIS — Z79.899 SEVERE CENTRAL SLEEP APNEA COMORBID WITH PRESCRIBED OPIOID USE: ICD-10-CM

## 2020-02-14 DIAGNOSIS — G47.33 OSA (OBSTRUCTIVE SLEEP APNEA): ICD-10-CM

## 2020-02-14 DIAGNOSIS — C44.320 SQUAMOUS CELL CARCINOMA OF SKIN OF FACE: Primary | ICD-10-CM

## 2020-02-14 PROCEDURE — 1101F PR PT FALLS ASSESS DOC 0-1 FALLS W/OUT INJ PAST YR: ICD-10-PCS | Mod: HCNC,CPTII,S$GLB, | Performed by: OTOLARYNGOLOGY

## 2020-02-14 PROCEDURE — 99214 PR OFFICE/OUTPT VISIT, EST, LEVL IV, 30-39 MIN: ICD-10-PCS | Mod: HCNC,S$GLB,, | Performed by: OTOLARYNGOLOGY

## 2020-02-14 PROCEDURE — 99214 OFFICE O/P EST MOD 30 MIN: CPT | Mod: HCNC,S$GLB,, | Performed by: OTOLARYNGOLOGY

## 2020-02-14 PROCEDURE — 99999 PR PBB SHADOW E&M-EST. PATIENT-LVL II: CPT | Mod: PBBFAC,HCNC,, | Performed by: OTOLARYNGOLOGY

## 2020-02-14 PROCEDURE — 3079F DIAST BP 80-89 MM HG: CPT | Mod: HCNC,CPTII,S$GLB, | Performed by: OTOLARYNGOLOGY

## 2020-02-14 PROCEDURE — 3079F PR MOST RECENT DIASTOLIC BLOOD PRESSURE 80-89 MM HG: ICD-10-PCS | Mod: HCNC,CPTII,S$GLB, | Performed by: OTOLARYNGOLOGY

## 2020-02-14 PROCEDURE — 99999 PR PBB SHADOW E&M-EST. PATIENT-LVL II: ICD-10-PCS | Mod: PBBFAC,HCNC,, | Performed by: OTOLARYNGOLOGY

## 2020-02-14 PROCEDURE — 3075F PR MOST RECENT SYSTOLIC BLOOD PRESS GE 130-139MM HG: ICD-10-PCS | Mod: HCNC,CPTII,S$GLB, | Performed by: OTOLARYNGOLOGY

## 2020-02-14 PROCEDURE — 1159F MED LIST DOCD IN RCRD: CPT | Mod: HCNC,S$GLB,, | Performed by: OTOLARYNGOLOGY

## 2020-02-14 PROCEDURE — 1101F PT FALLS ASSESS-DOCD LE1/YR: CPT | Mod: HCNC,CPTII,S$GLB, | Performed by: OTOLARYNGOLOGY

## 2020-02-14 PROCEDURE — 3075F SYST BP GE 130 - 139MM HG: CPT | Mod: HCNC,CPTII,S$GLB, | Performed by: OTOLARYNGOLOGY

## 2020-02-14 PROCEDURE — 1159F PR MEDICATION LIST DOCUMENTED IN MEDICAL RECORD: ICD-10-PCS | Mod: HCNC,S$GLB,, | Performed by: OTOLARYNGOLOGY

## 2020-02-14 NOTE — PROGRESS NOTES
[facility] NOTE     CC: F/U cutaneous SCC     TREATMENT HISTORY:  1. WLE temple SCC/ACell [id number] - 9/27/2019  2. FTSG temple 10/7/2019  3. RT completed 11/27/2019      INTERVAL HISTORY:Lavelle Ladd returns to the [facility] for follow-up of SCC. No complaints today.Denies dysphagia, odynophagia, throat pain and otalgia.Voice is stable. Does not experience dry mouth. Denies fevers, chills, and nightsweats. He has healed well from his skin graft and has done well after finishing his RT. He is very troubled by his sleep apnea and underwent polysomnogram with Dr. Caballero last Saturday. 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    Exam:  FTSG well-healed with 100% take  RT dermatitis has resolved  Scalp essentially entirely covered with premalignant lesions  No cervical LAD  No facial weakness    A/P: From my perspective he has healed very well. He is under the care of his dermatologist for skin surveillance.

## 2020-02-14 NOTE — PROGRESS NOTES
Referring provider: Dr. Marcos Valderrama ISRAEL Ladd was seen 02/14/2020 for an audiological evaluation.  Patient reports history of hearing loss that has gradually decreased over several years, noting equally sided hearing.  He does not appreciate any changes in hearing over the past year.  He denies tinnitus.  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  He is here today to followp-up with Dr. Alarcon for radiation dermatitis.      Otoscopy:  Right ear: Complete cerumen impaction (dark, hardened cerumen); unable to visualize tympanic membrane  Left ear: Cerumen impaction with partial visualization of tympanic membrane    Due to cerumen impaction, audiogram could not be completed today.  Patient is advised to return for ENT wax removal followed by audiogram.  I recommend using Debrox nightly for 4-5 days in the left ear prior to ENT appointment to assist with ease of cerumen removal and comfort.

## 2020-02-19 NOTE — PROCEDURES
Patient Name: Lavelle Ladd  Date of Report: 20    Date of PS2020   [facility] No.: [id number]   : 1955                         Time of PSG:  10:01:39 PM - 5:00:07 AM  Sex:  Male   Age:  64   Weight:  225.0 lbs Height:  5  11       Type of PSG:  BiPAP titration    REASONS FOR REFERRAL: Mr. Pinon diagnostic polysomnography (20) revealed an Apnea + Hypopnea Index = 35.3 events / hr asleep, severe sleep apnea / hypopnea syndrome (61 obstructive events, 60 central events, periodic but not Cheyne - Solomon) .  CPAP titration was recommended, and Dr. Colin Garcia, the referring physician, ordered it.  Dr. Rishabh Esteban is the patients primary care physician.            STUDY PARAMETERS: This study involved analysis of the patient's sleep pattern while breathing was assisted. The study was performed with a sleep technologist in attendance for the entire test period, with video monitoring throughout the study, and routine laboratory clinical parameters recorded:  NOTE: The polysomnography electrophysiological record for the patient has been reviewed in its entirety by Dr. Gutierrez.    SUMMARY STATEMENTS  DIAGNOSTIC IMPRESSIONS  G47.33  /  327.23  Mild to Moderate Obstructive Sleep Apnea, Adult (OSAHS)  G47.39  /  327.27  Mild to Moderate Central Sleep Apnea Comorbid with Prescribed Opioid Use and  G47.37  /  428.0, 327.27 Central Sleep Apnea secondary to congestive heart failure (CHF)   F51.12   /  307.44  Mild Insufficient Sleep Syndrome  Z72.821 /  V69.4  Inadequate Sleep Hygiene  F51.04   /  307.42  r/o Psychophysiological Insomnia (stress - related and / or conditioned)    G47.01   / 327.01  r/o Insomnia due to Medical Condition (pain, discomfort)  NEW DIAGNOSTIC IMPRESSIONS  G47.61  /  327.51  Periodic Limb Movement (PLM) Disorder     PRIMARY TREATMENT RECOMMENDATIONS  Treat, or refer to Sleep Disorders Center.  1. BiPAP was initiated at  10:01 pm.  The titration polysomnography revealed  that BiPAP (Bi - Flex 2, humidity 3) of 18 cm IPAP  /  14 cm EPAP, the most effective pressure most completely tested, was optimal, as it reduced the rate of respiratory events to zero in 0.7 hrs sleep (0.14 hrs REM sleep).  Lower pressure also might be successful (16 cm / 12 cm A + H I = 0.0, with 0.35 hrs REM sleep  but with < 0.1 hr NREM sleep in only 0.4 hrs sleep). Snoring was eliminated at BiPAP 16 cm / 12 cm and above.    The overall A + H Index was 2.5 / hr asleep, with only  0.8 respiratory event - related arousals / hr asleep with no RERAs for the titration trial.  The mean SpO2 value was 95.3  % throughout the study, mild, with a minimum oxygen saturation during sleep of 90.0  %  (waking baseline SpO2 was 99 %).   A medium ResMed Air Fit  F20  full -  face  CPAP mask was used and was tolerated,  but  sleep during CPAP was reduced. Please see Treatment Chart, below.    .     2. 16 cm / 12 cm BiPAP (Bi - Flex 2, [id number]) is recommended, but  only  after  successful habituation to PAP at home (gradually increased PAP pressures used for increasing amounts of time, initially while awake and occupied, and then while asleep).     3. Weight loss to the normal range is recommended as it can decrease respiratory events and snoring in overweight patients.    SECONDARY TREATMENT RECOMMENDATIONS  Treat, or refer to SDC if problems are not satisfactorily resolved by the above.  1. A severe  PLM disorder was observed (PLMS Index = 55.1 / hr sleep), but treatment might not be optimal because the PLMS were not disruptive of sleep (only 2.9 arousals / hr asleep), and there were no signs of restless legs syndrome in the SDI,   H & P or PSG;  consider treatment of PLM disorder only if PLMS symptoms are sufficiently bothersome to the patient.  Note that benzodiazepine medications sometimes used to treat PLM disorder (e.g., clonazepam) may exacerbate some sleep - related respiratory disorders, and dopaminergic medications  such as Mirapex and Requip can be used in such instances.      The following treatment recommendations from the Sleep Disorder Evaluation of  1-23-20 likely still apply:    PRIMARY TREATMENT RECOMMENDATIONS  Treat, or refer to Sleep Disorders Center.  1. The diagnostic polysomnography revealed a severe sleep apnea / hypopnea syndrome (A + H Index = 35.3 events / hr asleep with 8.6 respiratory event - related arousals / hr asleep for the study, and no RERAs (respiratory effort -  related arousals).  The mean SpO2 value was 92.5 %, moderate, minimum oxygen saturation during sleep was 87.0 %, and waking baseline SpO2 was 98 %.  Sporadic, moderately loud snoring was noted.  CPAP titration polysomnography is recommended.            NOTE: The overall apnea-hypopnea index (AHI) was 35.3 events /hr asleep.  Mr. Ladd had 53 hypopneas  8 obstructive sleep apneas, 60 central sleep apneas, and 3 mixed sleep apneas.  There was a 1.8 hour long series of periodic central sleep apneas, in which Cheyne - Solomon breathing morphology was not evident.  Mr. Ladd has congestive heart failure and takes hydrocodone - acetaminophen / NORCO by prescription.    2. Weight loss to the normal range is recommended as it can decrease respiratory events and snoring in overweight patients.  3. The following changes in sleep hygiene / sleep - related behavior are recommended after medical treatments are successful   Set time for sleep to number of hours of sleep needed for adequate daytime functioning (7.5 to 9.0 hrs / night).    SECONDARY TREATMENT RECOMMENDATIONS  Treat, or refer to SDC if problems are not satisfactorily resolved by the above.  1. Follow - up inquiry regarding frequency, duration and nature of reported sleep loss and delayed sleep onset (r/o  stress - related and / or conditioned psychophysiological insomnia).  Please see SDI.  2. Follow - up inquiry regarding sleep disruption secondary to pain or other physical discomfort  (please see SDI).    See below for a complete interpretation of data from the polysomnography and Sleep Disorders Inventory.     Thank you for referring this patient to the [facility].      Jose David Gutierrez, Ph.D., ABPP; Diplomate, American Board of Sleep Medicine

## 2020-02-20 ENCOUNTER — TELEPHONE (OUTPATIENT)
Dept: NEPHROLOGY | Facility: CLINIC | Age: 65
End: 2020-02-20

## 2020-02-20 DIAGNOSIS — G47.33 OBSTRUCTIVE SLEEP APNEA: Primary | ICD-10-CM

## 2020-02-20 NOTE — TELEPHONE ENCOUNTER
----- Message from Avel Estrada MD sent at 2/19/2020  8:20 PM CST -----  Regarding: Reminder about Lavelle Ladd [MRN: [id number]]  Severe MARIA INES

## 2020-02-21 ENCOUNTER — OFFICE VISIT (OUTPATIENT)
Dept: DIABETES | Facility: CLINIC | Age: 65
End: 2020-02-21
Payer: MEDICARE

## 2020-02-21 ENCOUNTER — TELEPHONE (OUTPATIENT)
Dept: DIABETES | Facility: CLINIC | Age: 65
End: 2020-02-21

## 2020-02-21 VITALS
SYSTOLIC BLOOD PRESSURE: 154 MMHG | HEART RATE: 87 BPM | HEIGHT: 71 IN | WEIGHT: 228.38 LBS | DIASTOLIC BLOOD PRESSURE: 92 MMHG | BODY MASS INDEX: 31.97 KG/M2

## 2020-02-21 DIAGNOSIS — E78.2 MIXED HYPERLIPIDEMIA: ICD-10-CM

## 2020-02-21 DIAGNOSIS — I25.10 CAD IN NATIVE ARTERY: ICD-10-CM

## 2020-02-21 DIAGNOSIS — E11.65 TYPE 2 DIABETES MELLITUS WITH HYPERGLYCEMIA, WITH LONG-TERM CURRENT USE OF INSULIN: Primary | ICD-10-CM

## 2020-02-21 DIAGNOSIS — Z89.9 H/O AMPUTATION: ICD-10-CM

## 2020-02-21 DIAGNOSIS — K31.84 GASTROPARESIS: ICD-10-CM

## 2020-02-21 DIAGNOSIS — N18.30 STAGE 3 CHRONIC KIDNEY DISEASE: ICD-10-CM

## 2020-02-21 DIAGNOSIS — Z94.0 S/P KIDNEY TRANSPLANT: Chronic | ICD-10-CM

## 2020-02-21 DIAGNOSIS — E11.42 DIABETIC POLYNEUROPATHY ASSOCIATED WITH TYPE 2 DIABETES MELLITUS: ICD-10-CM

## 2020-02-21 DIAGNOSIS — Z79.4 TYPE 2 DIABETES MELLITUS WITH HYPERGLYCEMIA, WITH LONG-TERM CURRENT USE OF INSULIN: Primary | ICD-10-CM

## 2020-02-21 DIAGNOSIS — Z91.199 NON COMPLIANCE WITH MEDICAL TREATMENT: ICD-10-CM

## 2020-02-21 DIAGNOSIS — I10 ESSENTIAL HYPERTENSION: ICD-10-CM

## 2020-02-21 DIAGNOSIS — I73.9 PERIPHERAL VASCULAR DISEASE: ICD-10-CM

## 2020-02-21 LAB — GLUCOSE SERPL-MCNC: 267 MG/DL (ref 70–110)

## 2020-02-21 PROCEDURE — 1125F AMNT PAIN NOTED PAIN PRSNT: CPT | Mod: HCNC,S$GLB,, | Performed by: PHYSICIAN ASSISTANT

## 2020-02-21 PROCEDURE — 3051F HG A1C>EQUAL 7.0%<8.0%: CPT | Mod: HCNC,CPTII,S$GLB, | Performed by: PHYSICIAN ASSISTANT

## 2020-02-21 PROCEDURE — 95249 CONT GLUC MNTR PT PROV EQP: CPT | Mod: HCNC,S$GLB,, | Performed by: PHYSICIAN ASSISTANT

## 2020-02-21 PROCEDURE — 99215 PR OFFICE/OUTPT VISIT, EST, LEVL V, 40-54 MIN: ICD-10-PCS | Mod: HCNC,S$GLB,, | Performed by: PHYSICIAN ASSISTANT

## 2020-02-21 PROCEDURE — 3077F SYST BP >= 140 MM HG: CPT | Mod: HCNC,CPTII,S$GLB, | Performed by: PHYSICIAN ASSISTANT

## 2020-02-21 PROCEDURE — 1100F PTFALLS ASSESS-DOCD GE2>/YR: CPT | Mod: HCNC,CPTII,S$GLB, | Performed by: PHYSICIAN ASSISTANT

## 2020-02-21 PROCEDURE — 3288F PR FALLS RISK ASSESSMENT DOCUMENTED: ICD-10-PCS | Mod: HCNC,CPTII,S$GLB, | Performed by: PHYSICIAN ASSISTANT

## 2020-02-21 PROCEDURE — 3051F PR MOST RECENT HEMOGLOBIN A1C LEVEL 7.0 - < 8.0%: ICD-10-PCS | Mod: HCNC,CPTII,S$GLB, | Performed by: PHYSICIAN ASSISTANT

## 2020-02-21 PROCEDURE — 1159F MED LIST DOCD IN RCRD: CPT | Mod: HCNC,S$GLB,, | Performed by: PHYSICIAN ASSISTANT

## 2020-02-21 PROCEDURE — 99215 OFFICE O/P EST HI 40 MIN: CPT | Mod: HCNC,S$GLB,, | Performed by: PHYSICIAN ASSISTANT

## 2020-02-21 PROCEDURE — 99999 PR PBB SHADOW E&M-EST. PATIENT-LVL III: CPT | Mod: PBBFAC,HCNC,, | Performed by: PHYSICIAN ASSISTANT

## 2020-02-21 PROCEDURE — 3080F DIAST BP >= 90 MM HG: CPT | Mod: HCNC,CPTII,S$GLB, | Performed by: PHYSICIAN ASSISTANT

## 2020-02-21 PROCEDURE — 3288F FALL RISK ASSESSMENT DOCD: CPT | Mod: HCNC,CPTII,S$GLB, | Performed by: PHYSICIAN ASSISTANT

## 2020-02-21 PROCEDURE — 82962 POCT GLUCOSE, HAND-HELD DEVICE: ICD-10-PCS | Mod: HCNC,S$GLB,, | Performed by: PHYSICIAN ASSISTANT

## 2020-02-21 PROCEDURE — 99999 PR PBB SHADOW E&M-EST. PATIENT-LVL III: ICD-10-PCS | Mod: PBBFAC,HCNC,, | Performed by: PHYSICIAN ASSISTANT

## 2020-02-21 PROCEDURE — 95249 PR GLUCOSE MONITORING, 72 HRS, SUB-Q SENSOR, PATIENT PROVIDED: ICD-10-PCS | Mod: HCNC,S$GLB,, | Performed by: PHYSICIAN ASSISTANT

## 2020-02-21 PROCEDURE — 99499 UNLISTED E&M SERVICE: CPT | Mod: HCNC,S$GLB,, | Performed by: PHYSICIAN ASSISTANT

## 2020-02-21 PROCEDURE — 3077F PR MOST RECENT SYSTOLIC BLOOD PRESSURE >= 140 MM HG: ICD-10-PCS | Mod: HCNC,CPTII,S$GLB, | Performed by: PHYSICIAN ASSISTANT

## 2020-02-21 PROCEDURE — 99499 RISK ADDL DX/OHS AUDIT: ICD-10-PCS | Mod: HCNC,S$GLB,, | Performed by: PHYSICIAN ASSISTANT

## 2020-02-21 PROCEDURE — 1100F PR PT FALLS ASSESS DOC 2+ FALLS/FALL W/INJURY/YR: ICD-10-PCS | Mod: HCNC,CPTII,S$GLB, | Performed by: PHYSICIAN ASSISTANT

## 2020-02-21 PROCEDURE — 3080F PR MOST RECENT DIASTOLIC BLOOD PRESSURE >= 90 MM HG: ICD-10-PCS | Mod: HCNC,CPTII,S$GLB, | Performed by: PHYSICIAN ASSISTANT

## 2020-02-21 PROCEDURE — 82962 GLUCOSE BLOOD TEST: CPT | Mod: HCNC,S$GLB,, | Performed by: PHYSICIAN ASSISTANT

## 2020-02-21 PROCEDURE — 1159F PR MEDICATION LIST DOCUMENTED IN MEDICAL RECORD: ICD-10-PCS | Mod: HCNC,S$GLB,, | Performed by: PHYSICIAN ASSISTANT

## 2020-02-21 PROCEDURE — 1125F PR PAIN SEVERITY QUANTIFIED, PAIN PRESENT: ICD-10-PCS | Mod: HCNC,S$GLB,, | Performed by: PHYSICIAN ASSISTANT

## 2020-02-21 RX ORDER — PEN NEEDLE, DIABETIC 30 GX3/16"
1 NEEDLE, DISPOSABLE MISCELLANEOUS 4 TIMES DAILY
Qty: 200 EACH | Refills: 11 | Status: SHIPPED | OUTPATIENT
Start: 2020-02-21 | End: 2021-03-05

## 2020-02-21 RX ORDER — INSULIN DEGLUDEC 100 U/ML
30 INJECTION, SOLUTION SUBCUTANEOUS DAILY
Qty: 9 ML | Refills: 11 | Status: SHIPPED | OUTPATIENT
Start: 2020-02-21 | End: 2020-11-24 | Stop reason: SDUPTHER

## 2020-02-21 NOTE — TELEPHONE ENCOUNTER
----- Message from Yari Bob sent at 2/21/2020  9:32 AM CST -----  Contact: [facility] - Mr [facility]  Stated they're calling verify pt medication for (semaglutide (OZEMPIC) 0.25 mg or 0.5 mg(2 mg/1.5 mL) PnIj) , he can be reached at [phone]

## 2020-02-21 NOTE — TELEPHONE ENCOUNTER
Called back to clarify medication dose. Clarify dose is 0.5mg.   ----- Message from Yari Bob sent at 2/21/2020  9:32 AM CST -----  Contact: [facility] - Mr [facility]  Stated they're calling verify pt medication for (semaglutide (OZEMPIC) 0.25 mg or 0.5 mg(2 mg/1.5 mL) PnIj) , he can be reached at [phone]

## 2020-02-21 NOTE — PATIENT INSTRUCTIONS
Today:   - Restart Tresiba 28 units daily  - Start Ozempic 0.5 mg weekly   - Check Fabrice 4 times daily: Fasting, before lunch, before dinner, and before bed. Also, whenever Low.

## 2020-02-21 NOTE — PROGRESS NOTES
PCP: Rishabh Esteban MD    Subjective:     Chief Complaint: Diabetes - Established Patient    HISTORY OF PRESENT ILLNESS: 64 y.o.   male presenting for diabetes management visit.   Patient has previously been established with the Diabetes Management Department and was last seen by Amira Lynn PA-C on 02/16/19.   Patient is new to me and is here for establishment of future care .   Patient has had Type II diabetes since 2010.  Pertinent to decision making is the following comorbidities: HTN, HLD, CAD, CHF, PVD, S/p Transplant - Kidney in 2016, Obesity by BMI, COPD and Immunocompromised  Patient has the following Diabetes complications: with diabetic chronic kidney disease, with diabetic polyneuropathy and with diabetic retinopathy;  He  has attended diabetes education in the past.     Patient's most recent A1c of 7.1% was completed 2 months ago.   Patient states since His last A1c His blood glucose levels have been both high and low throughout the day .  Patient reports that he has had several health problems which he thinks has been contributing to this including a history of an MI in October and radiation for his skin cancer.   Patient monitors blood glucose Continuously with CGM Fabrice.   Patient blood glucose monitoring device will be uploaded into [employer] [facility] today.  Interpretation of CGM meter includes Avg  mg/dl. Patient's time in range was 7%, time above range was 93%, and time below ranges was 0%.   Patient's records show not continuous use of CGM with several days without any readings.  Records show hyperglycemia up to 500 postprandial.   Patient endorses the following diabetes related symptoms: None*.   Patient is due today for the following diabetes-related health maintenance standards: Foot Exam - to be completed today and Eye Exam.   He denies any recent hospital admissions or emergency room visits.   He voices having hypoglycemia overnight and early am.  Patient did not record any  "of these episodes of hypoglycemia on Fabrice.   Patient's concerns today include glycemic control.  Of note, patient would like prescription for diabetic shoes.   Patient medication regimen is as below.     CURRENT DM MEDICATIONS:    Tresiba 32 units daily - patient reports being without for 1 and half weeks   Victoza 1.8 mg daily    Patient has failed the following Diabetes medications:    Metformin      Labs Reviewed.       Lab Results   Component Value Date    CPEPTIDE 3.5 03/20/2017     Lab Results   Component Value Date    GLUTAMICACID 0.00 03/20/2017       Height: 5' 11" (180.3 cm)  //  Weight: 103.6 kg (228 lb 6.3 oz), Body mass index is 31.85 kg/m².  His blood sugar in clinic today is:    Lab Results   Component Value Date    POCGLU 267 (A) 02/21/2020       Review of Systems   Constitutional: Negative for activity change, appetite change, chills and fever.   HENT: Negative for dental problem, mouth sores, nosebleeds, sore throat and trouble swallowing.    Eyes: Negative for pain and discharge.   Respiratory: Negative for shortness of breath, wheezing and stridor.    Cardiovascular: Negative for chest pain, palpitations and leg swelling.   Gastrointestinal: Negative for abdominal pain, diarrhea, nausea and vomiting.   Endocrine: Negative for polydipsia, polyphagia and polyuria.   Genitourinary: Negative for dysuria, frequency and urgency.   Musculoskeletal: Negative for joint swelling and myalgias.   Skin: Negative for rash and wound.   Neurological: Negative for dizziness, syncope, weakness and headaches.   Psychiatric/Behavioral: Negative for behavioral problems and dysphoric mood.         Diabetes Management Status  Statin: Taking  ACE/ARB: Not taking    Screening or Prevention Patient's value Goal Complete/Controlled?   HgA1C Testing and Control   Lab Results   Component Value Date    HGBA1C 7.1 (H) 12/31/2019      Annually/Less than 8% Yes   Lipid profile : 07/21/2019 Annually Yes   LDL control Lab " Results   Component Value Date    LDLCALC 59.4 (L) 2019    Annually/Less than 100 mg/dl  Yes   Nephropathy screening Lab Results   Component Value Date    MICALBCREAT 251.4 (H) 2016     Lab Results   Component Value Date    PROTEINUA Trace (A) 2020    Annually Yes   Blood pressure BP Readings from Last 1 Encounters:   20 138/84    Less than 140/90 Yes   Dilated retinal exam : 2017 Annually No    Foot exam   : 2019 Annually No     ACTIVITY LEVEL: Sedentary. Discussed activities, benefits, methods, and precautions.  MEAL PLANNING: Patient reports number of meals per day to be 3 and number of snacks per day to be 2.   Patient is encouraged to carb count and consume no more than 30 - 45 grams of carbohydrates in each meal and 15 grams of carbohydrates in each snack.     Social History     Socioeconomic History    Marital status: Single     Spouse name: Not on file    Number of children: Not on file    Years of education: Not on file    Highest education level: Not on file   Occupational History    Occupation: Disabled     Employer: DISABLED   Social Needs    Financial resource strain: Not on file    Food insecurity:     Worry: Not on file     Inability: Not on file    Transportation needs:     Medical: Not on file     Non-medical: Not on file   Tobacco Use    Smoking status: Former Smoker     Packs/day: 1.00     Years: 40.00     Pack years: 40.00     Last attempt to quit: 2013     Years since quittin.1    Smokeless tobacco: Never Used   Substance and Sexual Activity    Alcohol use: Yes     Alcohol/week: 6.0 standard drinks     Types: 6 Cans of beer per week     Comment: seldom    Drug use: No    Sexual activity: Never   Lifestyle    Physical activity:     Days per week: Not on file     Minutes per session: Not on file    Stress: Not on file   Relationships    Social connections:     Talks on phone: Not on file     Gets together: Not on file     Attends  Worship service: Not on file     Active member of club or organization: Not on file     Attends meetings of clubs or organizations: Not on file     Relationship status: Not on file   Other Topics Concern    Not on file   Social History Narrative    Lives alone. Retired from construction and various jobs, now retired. Would like to return to work PT to alleviate boredom.     Past Medical History:   Diagnosis Date    DINORAH (acute kidney injury) 2016    Arthritis     CAD in native artery 2019    CHF (congestive heart failure)     Chronic obstructive pulmonary disease 2016    Coronary artery disease involving native coronary artery of native heart without angina pectoris 2016    CRI (chronic renal insufficiency) 2019     donor kidney transplant for DM 16     Induction with Thymo x3 and IV solumedrol to total 875mg  Kidney Biopsy  2016: 16 glomeruli, ACR type 1 AVR type 2, significant microcirculatory changes, c4d negative, No DSA, 5 to10% fibrosis. Treated with thymo x8 2016- no rejection      Diastolic heart failure     Encounter for blood transfusion     ESRD on RRT since 10/2013 10/29/2013    Biopsy proven diabetic nephropathy and lymphoplasmacytic interstitial infiltrate not c/w with AIN (ddx sjogrens or assoc with tamm-horsefall protein extravasation)     GERD (gastroesophageal reflux disease)     History of hepatitis C, s/p successful Rx w/ SVR12 - 2017    Completed 12 weeks harvoni w/ SVR    Hyperlipidemia     Hypertension     PAD (peripheral artery disease) 2019    PIC line (peripherally inserted central catheter) flush     Prophylactic immunotherapy     Proteinuria     PVD (peripheral vascular disease) 2017    RLE BKA CT 16 Extensive atherosclerotic disease of the aorta and mesenteric arteries.     Renal hypertension     Type 2 diabetes mellitus with diabetic neuropathy, with long-term current use of insulin  12/1/2016    Vitamin B12 deficiency        Objective:        Physical Exam   Constitutional: He is oriented to person, place, and time. He appears well-developed and well-nourished. No distress.   HENT:   Head: Normocephalic and atraumatic.   Right Ear: External ear normal.   Left Ear: External ear normal.   Nose: Nose normal.   Eyes: Pupils are equal, round, and reactive to light. EOM are normal. Right eye exhibits no discharge. Left eye exhibits no discharge.   Neck: Normal range of motion. Neck supple.   Cardiovascular: Normal rate, regular rhythm, normal heart sounds and intact distal pulses.   Pulses:       Dorsalis pedis pulses are 2+ on the right side, and 1+ on the left side.        Posterior tibial pulses are 2+ on the right side, and 1+ on the left side.   Pulmonary/Chest: Effort normal and breath sounds normal.   Abdominal: Soft.   Musculoskeletal: Normal range of motion.        Right foot: There is normal range of motion and no deformity.        Left foot: There is normal range of motion and no deformity.   Feet:   Right Foot: amputated  Protective Sensation: 6 sites tested. 6 sites sensed.   Skin Integrity: Negative for ulcer, blister, skin breakdown, erythema, warmth, callus or dry skin.   Left Foot: amputated  Protective Sensation: 4 sites tested. 0 sites sensed.   Skin Integrity: Positive for callus and dry skin. Negative for ulcer, blister, skin breakdown, erythema or warmth.   Neurological: He is oriented to person, place, and time.   Skin: Skin is warm and dry. Capillary refill takes less than 2 seconds. He is not diaphoretic.   Psychiatric: He has a normal mood and affect. His behavior is normal. Judgment and thought content normal.         Assessment / Plan:     Type 2 diabetes mellitus with hyperglycemia, with long-term current use of insulin  -     POCT Glucose, Hand-Held Device  -     semaglutide (OZEMPIC) 0.25 mg or 0.5 mg(2 mg/1.5 mL) PnIj; Inject 0.5 mg into the skin every 7 days.   "Dispense: 3 Syringe; Refill: 3  -     pen needle, diabetic 32 gauge x 5/32" Ndle; 1 each by Misc.(Non-Drug; Combo Route) route 4 (four) times daily. Urszula Needles  Dispense: 200 each; Refill: 11  -     Ambulatory referral/consult to Optometry; Future; Expected date: 2020  -     TRESIBA FLEXTOUCH U-100 100 unit/mL (3 mL) InPn; Inject 30 Units into the skin once daily.  Dispense: 9 mL; Refill: 11    Non compliance with medical treatment    Diabetic polyneuropathy associated with type 2 diabetes mellitus    Gastroparesis    Peripheral vascular disease    Stage 3 chronic kidney disease     donor kidney transplant for DM 16    CAD in native artery    Essential hypertension    Mixed hyperlipidemia    H/O amputation      Additional Plan Details:    - POCT Glucose  - Encouraged continuation of lifestyle changes including regular exercise and limiting carbohydrates to 30-45 grams per meal threes times daily and 15 grams per snack with a limit of two daily.   - Patient is new to me today and will establish care with me for future visits.  - Encouraged continued monitoring of blood glucose with an increase to 3 times daily at Fasting, Before Lunch, Before Dinner and Before Bed with Fabrice CGM.   - Current DM Medication Regimen:  Change Tresiba to 28 units daily secondary to overnight hypoglycemia reported by patient. Change Victoza to Ozempic 0.5 mg weekly.  Discussed with patient that I do need him to increase the number of monitoring blood sugars because he may need meal time insulin to control hyperglycemia.   - Health Maintenance standards addressed today: Foot Exam - completed today and documented in physical exam with feedback to patient about proper diabetic foot care and findings and [facility]  - Establish care with primary care physician within [facility] system - DM Shoes Rx from PCP since PA unable to write Rx in [facility]  - Nursing " Visit: Patient is deemed uncontrolled due to Hyperglycemia and will need nursing visit in 2 weeks for BG log review and non-insulin adjustment.   - Follow up in 8 weeks with A1c prior.       Blakeney McKnight, PA-C [facility]    A total of 60 minutes was spent in face to face time, of which over 50 % was spent in counseling patient on disease process, complications, treatment, and side effects of medications.

## 2020-03-02 ENCOUNTER — TELEPHONE (OUTPATIENT)
Dept: DIABETES | Facility: CLINIC | Age: 65
End: 2020-03-02

## 2020-03-02 NOTE — TELEPHONE ENCOUNTER
----- Message from Violeta Talavera sent at 3/2/2020  3:53 PM CST -----  Contact: pt   .Type:  Patient Returning Call    Who Called:pt  Who Left Message for Patient:BONNIE  Does the patient know what this is regarding?:no  Would the patient rather a call back or a response via [facility]? Call back  Best Call Back Number:[phone]  Additional Information:

## 2020-03-02 NOTE — TELEPHONE ENCOUNTER
----- Message from Bren Ruiz PA-C sent at 3/1/2020 11:55 AM CST -----  Please contact patient to see if we can get him to complete nursing visit after his visit this week with Dr. Calzada. Tell him we would just like to come by to scan his salvador so I can see how his sugars are doing since resuming the Tresiba,     Thank you

## 2020-03-03 ENCOUNTER — TELEPHONE (OUTPATIENT)
Dept: DIABETES | Facility: CLINIC | Age: 65
End: 2020-03-03

## 2020-03-03 NOTE — TELEPHONE ENCOUNTER
----- Message from Adriana Samson sent at 3/3/2020 10:25 AM CST -----  Contact: Self- [phone]  .Type:  RX Refill Request    Who Called: Lavelle Ladd    Refill or New Rx:Refill   RX Name and Strength:flash glucose sensor (FREESTYLE BRYAN 14 DAY SENSOR) Kit  How is the patient currently taking it? (ex. 1XDay):5-10xday   Is this a 30 day or 90 day RX:30day   Preferred Pharmacy with phone number:[id number] - [place] - [address]  [address] [address] [address]  Phone: [phone] Fax: [phone]      Local or Mail Order:Local   Ordering Provider:   Would the patient rather a call back or a response via [facility]? Call [facility]   Best Call Back Number:.[phone] (home)   Additional Information:

## 2020-03-04 ENCOUNTER — TELEPHONE (OUTPATIENT)
Dept: INTERNAL MEDICINE | Facility: CLINIC | Age: 65
End: 2020-03-04

## 2020-03-04 ENCOUNTER — OFFICE VISIT (OUTPATIENT)
Dept: INTERNAL MEDICINE | Facility: CLINIC | Age: 65
End: 2020-03-04
Payer: MEDICARE

## 2020-03-04 VITALS
TEMPERATURE: 98 F | HEIGHT: 71 IN | SYSTOLIC BLOOD PRESSURE: 130 MMHG | DIASTOLIC BLOOD PRESSURE: 84 MMHG | BODY MASS INDEX: 32.29 KG/M2 | OXYGEN SATURATION: 97 % | HEART RATE: 75 BPM | WEIGHT: 230.63 LBS

## 2020-03-04 DIAGNOSIS — I10 ESSENTIAL HYPERTENSION: Primary | ICD-10-CM

## 2020-03-04 DIAGNOSIS — E78.2 MIXED HYPERLIPIDEMIA: ICD-10-CM

## 2020-03-04 DIAGNOSIS — Z79.4 TYPE 2 DIABETES MELLITUS WITH STABLE PROLIFERATIVE RETINOPATHY OF BOTH EYES, WITH LONG-TERM CURRENT USE OF INSULIN: ICD-10-CM

## 2020-03-04 DIAGNOSIS — E11.3553 TYPE 2 DIABETES MELLITUS WITH STABLE PROLIFERATIVE RETINOPATHY OF BOTH EYES, WITH LONG-TERM CURRENT USE OF INSULIN: ICD-10-CM

## 2020-03-04 DIAGNOSIS — N25.81 SECONDARY HYPERPARATHYROIDISM OF RENAL ORIGIN: ICD-10-CM

## 2020-03-04 DIAGNOSIS — F11.93 OPIOID WITHDRAWAL: ICD-10-CM

## 2020-03-04 DIAGNOSIS — L97.422 DIABETIC ULCER OF LEFT MIDFOOT ASSOCIATED WITH TYPE 2 DIABETES MELLITUS, WITH FAT LAYER EXPOSED: ICD-10-CM

## 2020-03-04 DIAGNOSIS — E11.621 DIABETIC ULCER OF LEFT MIDFOOT ASSOCIATED WITH TYPE 2 DIABETES MELLITUS, WITH FAT LAYER EXPOSED: ICD-10-CM

## 2020-03-04 DIAGNOSIS — M86.9 OSTEOMYELITIS OF ANKLE AND FOOT: ICD-10-CM

## 2020-03-04 PROBLEM — I96 GANGRENE: Status: RESOLVED | Noted: 2018-06-13 | Resolved: 2020-03-04

## 2020-03-04 PROBLEM — I96 GANGRENE OF LEFT FOOT: Status: RESOLVED | Noted: 2018-09-21 | Resolved: 2020-03-04

## 2020-03-04 PROBLEM — I96 GANGRENE OF TOE OF LEFT FOOT: Status: RESOLVED | Noted: 2018-07-06 | Resolved: 2020-03-04

## 2020-03-04 PROCEDURE — 3079F DIAST BP 80-89 MM HG: CPT | Mod: HCNC,CPTII,S$GLB, | Performed by: FAMILY MEDICINE

## 2020-03-04 PROCEDURE — 99499 RISK ADDL DX/OHS AUDIT: ICD-10-PCS | Mod: HCNC,S$GLB,, | Performed by: FAMILY MEDICINE

## 2020-03-04 PROCEDURE — 3288F FALL RISK ASSESSMENT DOCD: CPT | Mod: HCNC,CPTII,S$GLB, | Performed by: FAMILY MEDICINE

## 2020-03-04 PROCEDURE — 99999 PR PBB SHADOW E&M-EST. PATIENT-LVL III: CPT | Mod: PBBFAC,HCNC,, | Performed by: FAMILY MEDICINE

## 2020-03-04 PROCEDURE — 1125F AMNT PAIN NOTED PAIN PRSNT: CPT | Mod: HCNC,S$GLB,, | Performed by: FAMILY MEDICINE

## 2020-03-04 PROCEDURE — 99499 UNLISTED E&M SERVICE: CPT | Mod: HCNC,S$GLB,, | Performed by: FAMILY MEDICINE

## 2020-03-04 PROCEDURE — 3075F SYST BP GE 130 - 139MM HG: CPT | Mod: HCNC,CPTII,S$GLB, | Performed by: FAMILY MEDICINE

## 2020-03-04 PROCEDURE — 1159F MED LIST DOCD IN RCRD: CPT | Mod: HCNC,S$GLB,, | Performed by: FAMILY MEDICINE

## 2020-03-04 PROCEDURE — 1100F PTFALLS ASSESS-DOCD GE2>/YR: CPT | Mod: HCNC,CPTII,S$GLB, | Performed by: FAMILY MEDICINE

## 2020-03-04 PROCEDURE — 99214 PR OFFICE/OUTPT VISIT, EST, LEVL IV, 30-39 MIN: ICD-10-PCS | Mod: HCNC,S$GLB,, | Performed by: FAMILY MEDICINE

## 2020-03-04 PROCEDURE — 1159F PR MEDICATION LIST DOCUMENTED IN MEDICAL RECORD: ICD-10-PCS | Mod: HCNC,S$GLB,, | Performed by: FAMILY MEDICINE

## 2020-03-04 PROCEDURE — 3051F HG A1C>EQUAL 7.0%<8.0%: CPT | Mod: HCNC,CPTII,S$GLB, | Performed by: FAMILY MEDICINE

## 2020-03-04 PROCEDURE — 3051F PR MOST RECENT HEMOGLOBIN A1C LEVEL 7.0 - < 8.0%: ICD-10-PCS | Mod: HCNC,CPTII,S$GLB, | Performed by: FAMILY MEDICINE

## 2020-03-04 PROCEDURE — 3079F PR MOST RECENT DIASTOLIC BLOOD PRESSURE 80-89 MM HG: ICD-10-PCS | Mod: HCNC,CPTII,S$GLB, | Performed by: FAMILY MEDICINE

## 2020-03-04 PROCEDURE — 3288F PR FALLS RISK ASSESSMENT DOCUMENTED: ICD-10-PCS | Mod: HCNC,CPTII,S$GLB, | Performed by: FAMILY MEDICINE

## 2020-03-04 PROCEDURE — 99214 OFFICE O/P EST MOD 30 MIN: CPT | Mod: HCNC,S$GLB,, | Performed by: FAMILY MEDICINE

## 2020-03-04 PROCEDURE — 99999 PR PBB SHADOW E&M-EST. PATIENT-LVL III: ICD-10-PCS | Mod: PBBFAC,HCNC,, | Performed by: FAMILY MEDICINE

## 2020-03-04 PROCEDURE — 3075F PR MOST RECENT SYSTOLIC BLOOD PRESS GE 130-139MM HG: ICD-10-PCS | Mod: HCNC,CPTII,S$GLB, | Performed by: FAMILY MEDICINE

## 2020-03-04 PROCEDURE — 1125F PR PAIN SEVERITY QUANTIFIED, PAIN PRESENT: ICD-10-PCS | Mod: HCNC,S$GLB,, | Performed by: FAMILY MEDICINE

## 2020-03-04 PROCEDURE — 1100F PR PT FALLS ASSESS DOC 2+ FALLS/FALL W/INJURY/YR: ICD-10-PCS | Mod: HCNC,CPTII,S$GLB, | Performed by: FAMILY MEDICINE

## 2020-03-04 NOTE — TELEPHONE ENCOUNTER
Patient called with last two pneumonia injections.  I will put them in the database.       08/2018 prevnar 13  08/2019 pneumovac 23

## 2020-03-04 NOTE — LETTER
March 6, 2020      Bren Ruiz PA-C  [address]  [place] [address]           [facility]  [address]  [place] [address]  Phone: [phone]  Fax: [phone]          Patient: Lavelle Ladd   MR Number: [id number]   YOB: 1955   Date of Visit: 3/4/2020       Dear Bren Ruiz:    Thank you for referring Lavelle Ladd to me for evaluation. Attached you will find relevant portions of my assessment and plan of care.    If you have questions, please do not hesitate to call me. I look forward to following Lavelle Ladd along with you.    Sincerely,    Yahir Calzada MD    Enclosure  CC:  No Recipients    If you would like to receive this communication electronically, please contact [email or web address] or [phone] to request more information on [facility] access.    For providers and/or their staff who would like to refer a patient to [facility], please contact us through our one-stop-shop provider referral line, [facility], at [phone].    If you feel you have received this communication in error or would no longer like to receive these types of communications, please e-mail [email or web address]

## 2020-03-04 NOTE — TELEPHONE ENCOUNTER
----- Message from Marci South sent at 3/4/2020  3:57 PM CST -----  Contact: Bebe   Patient called in regards to more information the doctor needed , please call back at  [phone].        Thanks,  Marci South

## 2020-03-04 NOTE — PROGRESS NOTES
Subjective:       Patient ID: Lavelle Ladd is a 64 y.o. male.    Chief Complaint: Establish Care    Pt is a 64 year old is a DM that is controlled. Pt is a kidney transplant. Pt is here to establish care. His chronic medical conditions are stable. Reviewed his general health maintenance.     Review of Systems   Constitutional: Negative.    Respiratory: Negative.    Gastrointestinal: Negative.    Genitourinary: Negative.    Musculoskeletal: Negative.    Neurological: Negative.    Hematological: Negative.    Psychiatric/Behavioral: Negative.        Objective:      Physical Exam   Constitutional: He is oriented to person, place, and time. He appears well-developed and well-nourished.   Cardiovascular: Normal rate and regular rhythm. Exam reveals no friction rub.   No murmur heard.  Pulmonary/Chest: Effort normal and breath sounds normal. No stridor. He has no wheezes.   Abdominal: Soft. Bowel sounds are normal. There is no tenderness. There is no guarding.   Neurological: He is alert and oriented to person, place, and time.   Skin: Skin is warm and dry.       Assessment:       1. Essential hypertension    2. Secondary hyperparathyroidism of renal origin    3. Osteomyelitis of ankle and foot    4. Opioid withdrawal    5. Type 2 diabetes mellitus with stable proliferative retinopathy of both eyes, with long-term current use of insulin    6. Diabetic ulcer of left midfoot associated with type 2 diabetes mellitus, with fat layer exposed    7. Mixed hyperlipidemia        Plan:       Essential hypertension  Comments:  BP is well controlled    Secondary hyperparathyroidism of renal origin  Comments:  Pt continues to be followed by transplant team    Osteomyelitis of ankle and foot  Comments:  Sugars under control    Opioid withdrawal  Comments:  Not on ay narcotics at this time    Type 2 diabetes mellitus with stable proliferative retinopathy of both eyes, with long-term current use of insulin  Comments:  last a1c was  7.1    Diabetic ulcer of left midfoot associated with type 2 diabetes mellitus, with fat layer exposed  Comments:  Continue to monitor    Mixed hyperlipidemia  Comments:  Cholesterol is stable

## 2020-03-06 DIAGNOSIS — G47.37 CENTRAL SLEEP APNEA SECONDARY TO CONGESTIVE HEART FAILURE (CHF): Primary | ICD-10-CM

## 2020-03-06 DIAGNOSIS — I50.9 CENTRAL SLEEP APNEA SECONDARY TO CONGESTIVE HEART FAILURE (CHF): Primary | ICD-10-CM

## 2020-03-06 DIAGNOSIS — G47.34 NOCTURNAL HYPOXIA: ICD-10-CM

## 2020-03-09 ENCOUNTER — TELEPHONE (OUTPATIENT)
Dept: INTERNAL MEDICINE | Facility: CLINIC | Age: 65
End: 2020-03-09

## 2020-03-09 DIAGNOSIS — E11.311 TYPE 2 DIABETES MELLITUS WITH RIGHT EYE AFFECTED BY RETINOPATHY AND MACULAR EDEMA, WITH LONG-TERM CURRENT USE OF INSULIN, UNSPECIFIED RETINOPATHY SEVERITY: Primary | ICD-10-CM

## 2020-03-09 DIAGNOSIS — Z79.4 TYPE 2 DIABETES MELLITUS WITH RIGHT EYE AFFECTED BY RETINOPATHY AND MACULAR EDEMA, WITH LONG-TERM CURRENT USE OF INSULIN, UNSPECIFIED RETINOPATHY SEVERITY: Primary | ICD-10-CM

## 2020-03-09 NOTE — TELEPHONE ENCOUNTER
----- Message from Mabel Rodriguez sent at 3/9/2020  3:13 PM CDT -----  Contact: pt  Pt is asking the staff to give the pt a call regarding a RX  Pt call back [phone]    Thanks

## 2020-03-10 DIAGNOSIS — I73.9 PERIPHERAL VASCULAR DISEASE: ICD-10-CM

## 2020-03-10 DIAGNOSIS — E11.311 TYPE 2 DIABETES MELLITUS WITH RIGHT EYE AFFECTED BY RETINOPATHY AND MACULAR EDEMA, WITH LONG-TERM CURRENT USE OF INSULIN, UNSPECIFIED RETINOPATHY SEVERITY: Primary | ICD-10-CM

## 2020-03-10 DIAGNOSIS — Z79.4 TYPE 2 DIABETES MELLITUS WITH RIGHT EYE AFFECTED BY RETINOPATHY AND MACULAR EDEMA, WITH LONG-TERM CURRENT USE OF INSULIN, UNSPECIFIED RETINOPATHY SEVERITY: Primary | ICD-10-CM

## 2020-03-18 ENCOUNTER — TELEPHONE (OUTPATIENT)
Dept: INTERNAL MEDICINE | Facility: CLINIC | Age: 65
End: 2020-03-18

## 2020-03-18 NOTE — TELEPHONE ENCOUNTER
Spoke with patient.  He believes that his pain patch caused his stump to be irritated.  He will try bactroban ointment for now.  He will call back if it worsens

## 2020-03-18 NOTE — TELEPHONE ENCOUNTER
----- Message from Grecia Alvarado sent at 3/18/2020  9:01 AM CDT -----  Contact: self [phone]  Type:  Needs Medical Advice    Who Called: Lavelle Ladd  Symptoms (please be specific): Large red bump on stump  How long has patient had these symptoms:  A couple of days ago  Pharmacy name and phone #:      [id number] - [place] - [address]  [address] [address] [facility]  [place] [address]  Phone: [phone] Fax: [phone]    Would the patient rather a call back or a response via [facility]? Call back   Best Call Back Number: [phone]  Additional Information:

## 2020-03-19 ENCOUNTER — TELEPHONE (OUTPATIENT)
Dept: RADIATION ONCOLOGY | Facility: CLINIC | Age: 65
End: 2020-03-19

## 2020-04-15 ENCOUNTER — TELEPHONE (OUTPATIENT)
Dept: NEPHROLOGY | Facility: CLINIC | Age: 65
End: 2020-04-15

## 2020-04-15 RX ORDER — METHYLPHENIDATE HYDROCHLORIDE 10 MG/1
10 TABLET ORAL 2 TIMES DAILY WITH MEALS
Qty: 60 TABLET | Refills: 0 | Status: SHIPPED | OUTPATIENT
Start: 2020-04-15 | End: 2020-05-22 | Stop reason: SDUPTHER

## 2020-04-15 NOTE — TELEPHONE ENCOUNTER
----- Message from Jose Granda sent at 4/15/2020 11:58 AM CDT -----  Contact: pt  Type:  RX Refill Request    Who Called: AISHA CURRIE   Refill or New Rx: refill  RX Name and Strength: methylphenidate HCl 10 MG  How is the patient currently taking it? (ex. 1XDay): twice daily  Is this a 30 day or 90 day RX: 30 day  Preferred Pharmacy with phone number:     [id number] - [place] - [address] [facility]  [address] [address] [place] [address]  Phone: [phone] Fax: [phone]    Local or Mail Order: local  Ordering Provider: Dr anthony  Would the patient rather a call back or a response via [facility]? call  Best Call Back Number: [phone] (home)   Additional Information:

## 2020-04-16 RX ORDER — MYCOPHENOLATE MOFETIL 500 MG/1
TABLET ORAL
Qty: 120 TABLET | Refills: 1 | Status: SHIPPED | OUTPATIENT
Start: 2020-04-16 | End: 2020-06-30

## 2020-04-22 ENCOUNTER — OFFICE VISIT (OUTPATIENT)
Dept: CARDIOLOGY | Facility: CLINIC | Age: 65
End: 2020-04-22
Payer: MEDICARE

## 2020-04-22 DIAGNOSIS — I25.118 CORONARY ARTERY DISEASE OF NATIVE ARTERY OF NATIVE HEART WITH STABLE ANGINA PECTORIS: ICD-10-CM

## 2020-04-22 DIAGNOSIS — G47.37 CENTRAL SLEEP APNEA SECONDARY TO CONGESTIVE HEART FAILURE (CHF): ICD-10-CM

## 2020-04-22 DIAGNOSIS — I44.1 MOBITZ TYPE 1 SECOND DEGREE ATRIOVENTRICULAR BLOCK: ICD-10-CM

## 2020-04-22 DIAGNOSIS — R60.0 LOCALIZED EDEMA: ICD-10-CM

## 2020-04-22 DIAGNOSIS — E78.2 MIXED HYPERLIPIDEMIA: ICD-10-CM

## 2020-04-22 DIAGNOSIS — G47.33 OBSTRUCTIVE SLEEP APNEA: ICD-10-CM

## 2020-04-22 DIAGNOSIS — Z94.0 S/P KIDNEY TRANSPLANT: Chronic | ICD-10-CM

## 2020-04-22 DIAGNOSIS — I25.10 CAD IN NATIVE ARTERY: ICD-10-CM

## 2020-04-22 DIAGNOSIS — I10 ESSENTIAL HYPERTENSION: ICD-10-CM

## 2020-04-22 DIAGNOSIS — I12.9 RENAL HYPERTENSION: Chronic | ICD-10-CM

## 2020-04-22 DIAGNOSIS — I25.2 OLD MI (MYOCARDIAL INFARCTION): ICD-10-CM

## 2020-04-22 DIAGNOSIS — I13.10 MALIGNANT HTN WITH HEART DISEASE, W/O CHF, WITH CHRONIC KIDNEY DISEASE: Primary | ICD-10-CM

## 2020-04-22 DIAGNOSIS — I50.9 CENTRAL SLEEP APNEA SECONDARY TO CONGESTIVE HEART FAILURE (CHF): ICD-10-CM

## 2020-04-22 DIAGNOSIS — I73.9 PERIPHERAL VASCULAR DISEASE: ICD-10-CM

## 2020-04-22 PROCEDURE — 1100F PR PT FALLS ASSESS DOC 2+ FALLS/FALL W/INJURY/YR: ICD-10-PCS | Mod: 95,HCNC,CPTII, | Performed by: INTERNAL MEDICINE

## 2020-04-22 PROCEDURE — 99441 PR PHYSICIAN TELEPHONE EVALUATION 5-10 MIN: CPT | Mod: 95,HCNC,, | Performed by: INTERNAL MEDICINE

## 2020-04-22 PROCEDURE — 3288F FALL RISK ASSESSMENT DOCD: CPT | Mod: 95,HCNC,CPTII, | Performed by: INTERNAL MEDICINE

## 2020-04-22 PROCEDURE — 1159F PR MEDICATION LIST DOCUMENTED IN MEDICAL RECORD: ICD-10-PCS | Mod: 95,HCNC,, | Performed by: INTERNAL MEDICINE

## 2020-04-22 PROCEDURE — 1159F MED LIST DOCD IN RCRD: CPT | Mod: 95,HCNC,, | Performed by: INTERNAL MEDICINE

## 2020-04-22 PROCEDURE — 1100F PTFALLS ASSESS-DOCD GE2>/YR: CPT | Mod: 95,HCNC,CPTII, | Performed by: INTERNAL MEDICINE

## 2020-04-22 PROCEDURE — 3288F PR FALLS RISK ASSESSMENT DOCUMENTED: ICD-10-PCS | Mod: 95,HCNC,CPTII, | Performed by: INTERNAL MEDICINE

## 2020-04-22 PROCEDURE — 99441 PR PHYSICIAN TELEPHONE EVALUATION 5-10 MIN: ICD-10-PCS | Mod: 95,HCNC,, | Performed by: INTERNAL MEDICINE

## 2020-04-22 RX ORDER — AMLODIPINE BESYLATE 10 MG/1
10 TABLET ORAL DAILY
Qty: 90 TABLET | Refills: 1 | Status: SHIPPED | OUTPATIENT
Start: 2020-04-22 | End: 2020-11-24 | Stop reason: SDUPTHER

## 2020-04-22 RX ORDER — ASPIRIN 81 MG/1
81 TABLET ORAL DAILY
Qty: 90 TABLET | Refills: 1 | COMMUNITY
Start: 2020-04-22 | End: 2022-01-01

## 2020-04-22 RX ORDER — NITROGLYCERIN 0.4 MG/1
0.4 TABLET SUBLINGUAL EVERY 5 MIN PRN
Qty: 30 TABLET | Refills: 0 | Status: SHIPPED | OUTPATIENT
Start: 2020-04-22 | End: 2022-01-01

## 2020-04-22 RX ORDER — FUROSEMIDE 40 MG/1
40 TABLET ORAL DAILY PRN
Qty: 30 TABLET | Refills: 11 | Status: SHIPPED | OUTPATIENT
Start: 2020-04-22 | End: 2020-06-24 | Stop reason: SDUPTHER

## 2020-04-22 NOTE — PROGRESS NOTES
Established Patient - Audio Only Telehealth Visit     The patient location is: home  The chief complaint leading to consultation is: CV follow up   Visit type: Virtual visit with audio only (telephone)     The reason for the audio only service rather than synchronous audio and video virtual visit was related to technical difficulties or patient preference/necessity.     Each patient to whom I provide medical services by telemedicine is:  (1) informed of the relationship between the physician and patient and the respective role of any other health care provider with respect to management of the patient; and (2) notified that they may decline to receive medical services by telemedicine and may withdraw from such care at any time. Patient verbally consented to receive this service via voice-only telephone call.       Subjective:   Patient ID:  Lavelle Ladd is a 64 y.o. male who presents for cardiac consult of Follow-up      HPI  The patient came in today for cardiac consult of Follow-up    Lavelle Ladd is a 64 y.o. male pt with CAD s/p mult PCI, Pt has h/o CRI, DM, HTN, HLP, PAD, right BKA, transmetatarsal amputation of left foot, renal transplant in May 2016, COPD,  CAD and immunosuppressed post transplant here for CV follow up.      8/5/19 visit with Amol Alas, pt of Dr. Valdez  He presents to clinic today for hospital follow up. Presented to [facility] on 7/21/19 with acute anterolateral and inferior STEMI. Cath showed occluded apical LAD, diffuse non obstructive CAD elsewhere. Medically managed.  Patient hydrated with IVF the day after angiogram due to bump in creatine to 1.7. No ACEI ordered upon DC.     9/11/19  Pt is here for pre-op CV eval - scheduled for surgery (Skin excision) for skin cancer left temple on 9/27/19 with . Last [facility] 7/19 medically managed post STEMI. No CP/SOB.     12/11/19  He is s/p surgery and 20 rounds of radiation. He has been having more nausea, and has ear ache along with SOB  and L arm pain. He has been having low blood sugars.   He has been getting wound care at home with PT/OT. He has constant dull type aching pain. He has taken pain meds, takes Norco.     1/15/20  Nuclear stress neg after last visit. Earlier this year was admitted for B/L PNA. He went to the ER about 10 days ago - evaluation of SOB with sleeping which onset gradually over the last 2 hours. Pt was evaluated at the ED 2 hours ago and discharged with Levalbuterol nebulizer solution and vistaril 25 mg. Pt reports that when he tried to fall asleep, he suddenly cannot breathe causing anxiety with sleep. Pt was referred to pulmonology earlier today. Symptoms are episodic and moderate in severity. Sleep increases SOB.  BNP was mildly elevated referred to pulm. He was given IV lasix and has diuresed. He feels almost back to baseline. He also has PVD in arms will see vasc sx.     4/22/20  He still has nocturnal SOB, occ intermittent chest pain. He is more swollen lately discussed will increase lasix to 40 mg PRN. Weight flucuates, now 220. He takes Norco for pain breathing has improved with dec pain meds.   BP has been well controlled per pt. Pulse has been good. Doing well with Ozempic and blood sugars improved.     Patient feels no leg swelling, no PND, no palpitation, no dizziness, no syncope, no CNS symptoms.    Patient is compliant with medications.    Nuclear Quantitative Functional Analysis:   LVEF: 69 %    Impression: NORMAL MYOCARDIAL PERFUSION  1. The perfusion scan is free of evidence for myocardial ischemia or injury. The inferior wall is obscured by bowel.  2. Resting wall motion is physiologic.   3. Resting LV function is normal.   4. The ventricular volumes are normal at rest and stress.   5. The extracardiac distribution of radioactivity is normal.   6. When compared to the previous study from 02/20/2015, no ischecmic changes.     This document has been electronically    SIGNED BY: Michael Contreras MD On: 12/18/2019  16:02    CONCLUSIONS     1 - Normal left ventricular systolic function (EF 60-65%).     2 - Indeterminate LV diastolic function.     3 - Normal right ventricular systolic function .     4 - Wall motion abnormalities.     5 - Moderate left atrial enlargement.       This document has been electronically    SIGNED BY: Andrew Valdez MD On: 2019 11:21    19 [facility]  1. Routine post-cath care.  2. Recommend maximal medical management for apical LAD occlusion (small tortuous vessel and very distal occlusion).   3. Risk factor reductions.  4. ASA 81mg.  5. Clopidogrel (Plavix) for one year.  6. Maximize medical tx for PAD.    Past Medical History:   Diagnosis Date    DINORAH (acute kidney injury) 2016    Arthritis     CAD in native artery 2019    CHF (congestive heart failure)     Chronic obstructive pulmonary disease 2016    Coronary artery disease involving native coronary artery of native heart without angina pectoris 2016    CRI (chronic renal insufficiency) 2019     donor kidney transplant for DM 16     Induction with Thymo x3 and IV solumedrol to total 875mg  Kidney Biopsy  2016: 16 glomeruli, ACR type 1 AVR type 2, significant microcirculatory changes, c4d negative, No DSA, 5 to10% fibrosis. Treated with thymo x8 2016- no rejection      Diastolic heart failure     Encounter for blood transfusion     ESRD on RRT since 10/2013 10/29/2013    Biopsy proven diabetic nephropathy and lymphoplasmacytic interstitial infiltrate not c/w with AIN (ddx sjogrens or assoc with tamm-horsefall protein extravasation)     GERD (gastroesophageal reflux disease)     History of hepatitis C, s/p successful Rx w/ SVR12 - 2017    Completed 12 weeks harvoni w/ SVR    Hyperlipidemia     Hypertension     PAD (peripheral artery disease) 2019    PIC line (peripherally inserted central catheter) flush     Prophylactic immunotherapy     Proteinuria     PVD  (peripheral vascular disease) 6/26/2017    RLE BKA CT 12/11/16 Extensive atherosclerotic disease of the aorta and mesenteric arteries.     Renal hypertension     Type 2 diabetes mellitus with diabetic neuropathy, with long-term current use of insulin 12/1/2016    Vitamin B12 deficiency        Past Surgical History:   Procedure Laterality Date    AORTOGRAPHY WITH SERIALOGRAPHY N/A 6/14/2018    Procedure: LEFT LEG ANGIOGRAM;  Surgeon: Donal Mcdonald MD;  Location: [facility] CATH LAB;  Service: Vascular;  Laterality: N/A;    av bovine graft      Left UE    AV FISTULA PLACEMENT      left UE    CARDIAC CATHETERIZATION  02/2015    CLOSURE OF WOUND Left 9/24/2018    Procedure: CLOSURE, WOUND;  Surgeon: Karla Wheeler DPM;  Location: [facility] OR;  Service: Podiatry;  Laterality: Left;  Secondary Wound closure, extensive    CLOSURE OF WOUND Left 11/5/2018    Procedure: CLOSURE, WOUND;  Surgeon: Karla Wheeler DPM;  Location: [facility] OR;  Service: Podiatry;  Laterality: Left;    DEBRIDEMENT OF MULTIPLE METATARSAL BONES Left 11/5/2018    Procedure: DEBRIDEMENT, METATARSAL BONE, 2 OR MORE BONES;  Surgeon: Karla Wheeler DPM;  Location: [facility] OR;  Service: Podiatry;  Laterality: Left;    EXCISION OF SKIN Left 9/27/2019    Procedure: EXCISION, SKIN;  Surgeon: Lenard Alarcon MD;  Location: [address];  Service: Plastics;  Laterality: Left;  Plastics set, NIMS monitor, [employer]    FOOT AMPUTATION THROUGH METATARSAL Left 9/21/2018    Procedure: AMPUTATION, FOOT, TRANSMETATARSAL;  Surgeon: Karla Wheeler DPM;  Location: [facility] OR;  Service: Podiatry;  Laterality: Left;    FOOT AMPUTATION THROUGH METATARSAL Left 10/31/2018    Procedure: AMPUTATION, FOOT, TRANSMETATARSAL;  Surgeon: Karla Wheeler DPM;  Location: [facility] OR;  Service: Podiatry;  Laterality: Left;    FOOT AMPUTATION THROUGH METATARSAL Left 11/5/2018    Procedure: AMPUTATION, FOOT, TRANSMETATARSAL;  Surgeon: Karla Wheeler DPM;  Location: [facility]  OR;  Service: Podiatry;  Laterality: Left;  revisional transmetatarsal amputation, Left foot    IRRIGATION AND DEBRIDEMENT OF LOWER EXTREMITY Left 10/31/2018    Procedure: IRRIGATION AND DEBRIDEMENT, LOWER EXTREMITY;  Surgeon: Karla Wheeler DPM;  Location: [facility] OR;  Service: Podiatry;  Laterality: Left;    KIDNEY TRANSPLANT  2016    LEFT HEART CATHETERIZATION Left 2019    Procedure: CATHETERIZATION, HEART, LEFT;  Surgeon: Andrew Valdez MD;  Location: [facility] CATH LAB;  Service: Cardiology;  Laterality: Left;    LEG AMPUTATION THROUGH KNEE      right LE, started as nail puncture leading to diabetic ulcer    SKIN FULL THICKNESS GRAFT Left 10/7/2019    Procedure: APPLICATION, GRAFT, SKIN, FULL-THICKNESS;  Surgeon: Lenard Alarcon MD;  Location: [facility] OR [address] [place] [address];  Service: Plastics;  Laterality: Left;    SURGICAL REMOVAL OF LESION OF FACE Right 10/7/2019    Procedure: EXCISION, LESION, FACE;  Surgeon: Lenard Alarcon MD;  [facility];  Service: Plastics;  Laterality: Right;       Social History     Tobacco Use    Smoking status: Former Smoker     Packs/day: 1.00     Years: 40.00     Pack years: 40.00     Last attempt to quit: 2013     Years since quittin.2    Smokeless tobacco: Never Used   Substance Use Topics    Alcohol use: Yes     Alcohol/week: 6.0 standard drinks     Types: 6 Cans of beer per week     Comment: seldom    Drug use: No       Family History   Problem Relation Age of Onset    Cancer Father     Diabetes Father     Heart failure Father     Stroke Father     Heart failure Mother     Kidney disease Neg Hx        Patient's Medications   New Prescriptions    No medications on file   Previous Medications    AMLODIPINE (NORVASC) 10 MG TABLET    Take 1 tablet (10 mg total) by mouth once daily.    ASPIRIN (ECOTRIN) 81 MG EC TABLET    Take 1 tablet (81 mg total) by mouth once daily.    ATORVASTATIN (LIPITOR) 80 MG TABLET    Take 1 tablet (80 mg total) by  mouth once daily.    BD INSULIN SYRINGE ULTRA-FINE 1 ML 31 GAUGE X 5/16 SYRG    USE ONE AS DIRECTED FOUR TIMES DAILY WITH MEALS AND AT BEDTIME    BLOOD SUGAR DIAGNOSTIC STRP    1 each by Misc.(Non-Drug; Combo Route) route 3 (three) times daily.    BLOOD-GLUCOSE METER KIT    Use as instructed    CLOPIDOGREL (PLAVIX) 75 MG TABLET    Take 1 tablet (75 mg total) by mouth once daily.    DICLOFENAC (FLECTOR) 1.3 % PT12    Apply 1 packet topically once daily.    ERGOCALCIFEROL (ERGOCALCIFEROL) 50,000 UNIT CAP    Take 1 capsule (50,000 Units total) by mouth every 7 days. Take on Mondays    FLASH GLUCOSE SCANNING READER (FREESTYLE BRYAN 14 DAY READER) MISC    1 Device by Misc.(Non-Drug; Combo Route) route as needed.    FLASH GLUCOSE SENSOR (FREESTYLE BRYAN 14 DAY SENSOR) KIT    1 kit by Misc.(Non-Drug; Combo Route) route every 14 (fourteen) days.    FLUAD [id number], 65 YR UP,,PF, 45 MCG (15 MCG X 3)/0.5 ML SYRG        FLUOROURACIL (EFUDEX) 5 % CREAM    Apply topically 2 (two) times daily. For 3 weeks. Will cause redness and irritation.    GABAPENTIN (NEURONTIN) 300 MG CAPSULE        HYDROCODONE-ACETAMINOPHEN (NORCO)  MG PER TABLET    1 tablet by Per G Tube route every 6 (six) hours as needed for Pain.    HYDROXYZINE PAMOATE (VISTARIL) 25 MG CAP    Take 1 capsule (25 mg total) by mouth every 8 (eight) hours as needed (anxiety).    IPRATROPIUM (ATROVENT) 0.02 % NEBULIZER SOLUTION    Take 2.5 mLs (500 mcg total) by nebulization 4 (four) times daily.    ISOSORBIDE MONONITRATE (IMDUR) 30 MG 24 HR TABLET    Take 2 tablets (60 mg total) by mouth once daily.    LEVALBUTEROL (XOPENEX) 1.25 MG/3 ML NEBULIZER SOLUTION    Take 3 mLs (1.25 mg total) by nebulization every 6 to 8 hours as needed for Wheezing or Shortness of Breath.    METHYLPHENIDATE HCL (RITALIN) 10 MG TABLET    Take 1 tablet (10 mg total) by mouth 2 (two) times daily with meals.    METOPROLOL TARTRATE (LOPRESSOR) 25 MG TABLET    Take 1 tablet (25 mg total) by mouth  "2 (two) times daily.    MUPIROCIN (BACTROBAN) 2 % OINTMENT    Apply topically 3 (three) times daily.    MUPIROCIN CALCIUM 2% (BACTROBAN) 2 % CREAM    Apply topically 3 (three) times daily.    MYCOPHENOLATE (CELLCEPT) 500 MG TAB    TAKE ONE TABLET BY MOUTH TWICE DAILY    NALOXONE (NARCAN) 4 MG/ACTUATION SPRY    1 spray by Nasal route once.    NITROGLYCERIN (NITROSTAT) 0.4 MG SL TABLET    Place 1 tablet (0.4 mg total) under the tongue every 5 (five) minutes as needed.    ONDANSETRON (ZOFRAN-ODT) 4 MG TBDL    Take 1 tablet (4 mg total) by mouth every 8 (eight) hours as needed.    PEN NEEDLE, DIABETIC 32 GAUGE X 5/32" NDLE    1 each by Misc.(Non-Drug; Combo Route) route 4 (four) times daily. Urszula Cocoa    PREDNISONE (DELTASONE) 5 MG TABLET    Take 1 tablet (5 mg total) by mouth once daily.    SEMAGLUTIDE (OZEMPIC) 0.25 MG OR 0.5 MG(2 MG/1.5 ML) PNIJ    Inject 0.5 mg into the skin every 7 days.    SEVELAMER CARBONATE (RENVELA) 800 MG TAB        TACROLIMUS (PROGRAF) 0.5 MG CAP    Take 3 capsules (1.5 mg total) by mouth every 12 (twelve) hours.    TRESIBA FLEXTOUCH U-100 100 UNIT/ML (3 ML) INPN    Inject 30 Units into the skin once daily.    VIOS AEROSOL DELIVERY SYSTEM AURELIO    USE Q 4 H PRN   Modified Medications    Modified Medication Previous Medication    FUROSEMIDE (LASIX) 40 MG TABLET furosemide (LASIX) 20 MG tablet       Take 1 tablet (40 mg total) by mouth daily as needed (Leg swelling, Nocturnal SOB).    Take 1 tablet (20 mg total) by mouth daily as needed (Leg swelling, Nocturnal SOB).   Discontinued Medications    No medications on file       Review of Systems   Constitutional: Positive for malaise/fatigue.   HENT: Negative.    Eyes: Negative.    Respiratory: Positive for shortness of breath.    Cardiovascular: Negative for chest pain.   Gastrointestinal: Negative.    Genitourinary: Negative.    Musculoskeletal: Positive for myalgias and neck pain.   Skin: Negative.    Neurological: Negative.  "   Endo/Heme/Allergies: Negative.    Psychiatric/Behavioral: Negative.    All 12 systems otherwise negative.      Wt Readings from Last 3 Encounters:   03/04/20 104.6 kg (230 lb 9.6 oz)   02/21/20 103.6 kg (228 lb 6.3 oz)   02/14/20 100.7 kg (222 lb)     Temp Readings from Last 3 Encounters:   03/04/20 98 °F (36.7 °C) (Oral)   01/04/20 97.9 °F (36.6 °C) (Oral)   01/04/20 98 °F (36.7 °C) (Oral)     BP Readings from Last 3 Encounters:   03/04/20 130/84   02/21/20 (!) 154/92   02/14/20 138/84     Pulse Readings from Last 3 Encounters:   03/04/20 75   02/21/20 87   02/14/20 98       There were no vitals taken for this visit.    Objective:   Physical Exam   Constitutional: He is oriented to person, place, and time.   Neurological: He is alert and oriented to person, place, and time.   Psychiatric: He has a normal mood and affect. His behavior is normal. Judgment and thought content normal.       Lab Results   Component Value Date     01/04/2020    K 4.3 01/04/2020     01/04/2020    CO2 24 01/04/2020    BUN 20 01/04/2020    CREATININE 1.6 (H) 01/04/2020    GLU 96 01/04/2020    HGBA1C 7.1 (H) 12/31/2019    MG 1.9 01/02/2020    AST 51 (H) 01/04/2020    ALT 46 (H) 01/04/2020    ALBUMIN 3.8 01/04/2020    PROT 7.9 01/04/2020    BILITOT 0.5 01/04/2020    WBC 5.93 01/04/2020    HGB 13.8 (L) 01/04/2020    HCT 44.3 01/04/2020    MCV 92 01/04/2020     01/04/2020    INR 1.0 12/29/2019    TSH 0.909 07/21/2019    CHOL 123 07/21/2019    HDL 45 07/21/2019    LDLCALC 59.4 (L) 07/21/2019    TRIG 93 07/21/2019     (H) 01/04/2020     Assessment:      1. Malignant HTN with heart disease, w/o CHF, with chronic kidney disease    2. Localized edema    3. Renal hypertension    4. Essential hypertension    5. Coronary artery disease of native artery of native heart with stable angina pectoris    6. Mixed hyperlipidemia    7. Peripheral vascular disease    8. CAD in native artery    9. Old MI (myocardial infarction)    10.  Mobitz type 1 second degree atrioventricular block    11. Central sleep apnea secondary to congestive heart failure (CHF)    12.  donor kidney transplant for DM 16    13. Obstructive sleep apnea        Plan:     1. HTN with edema  - cont meds  - lasix increase to 40mg for edema/swelling    2. CAD   - pharm nuclear stress test - neg  - cont asa, statin, bb, plavix, imdur  - PRN NTG    3.HLD  - cont statin    4. CKD/ renal transplant   - cont to monitor, f/u nephro    5. DM2  - cont meds per PCP    6. COPD  - cont tx per pulm    7. PVD  -cont meds  - f/u vasc sx    Thank you for allowing me to participate in this patient's care. Please do not hesitate to contact me with any questions or concerns. Consult note has been forwarded to the referral physician.     Michael Contreras MD, [facility]  Cardiovascular Disease  [facility] [place]  [phone] (P)       This service was not originating from a related E/M service provided within the previous 7 days nor will  to an E/M service or procedure within the next 24 hours or my soonest available appointment.  Prevailing standard of care was able to be met in this audio-only visit.

## 2020-05-25 RX ORDER — METHYLPHENIDATE HYDROCHLORIDE 10 MG/1
TABLET ORAL
Qty: 60 TABLET | Refills: 0 | Status: SHIPPED | OUTPATIENT
Start: 2020-05-25 | End: 2022-01-01

## 2020-05-27 ENCOUNTER — TELEPHONE (OUTPATIENT)
Dept: INTERNAL MEDICINE | Facility: CLINIC | Age: 65
End: 2020-05-27

## 2020-05-27 NOTE — TELEPHONE ENCOUNTER
----- Message from Nani Castaneda sent at 5/27/2020 11:51 AM CDT -----  Contact: pt  Pt would like a dexcon glucose meter called for him and can be reached at [phone]    [id number] - [place] - [id number]  [address] [facility]  [place] [address]  Phone: [phone] Fax: [phone]    Thanks,  Nani Castaneda

## 2020-05-29 DIAGNOSIS — E11.3553 TYPE 2 DIABETES MELLITUS WITH STABLE PROLIFERATIVE RETINOPATHY OF BOTH EYES, WITH LONG-TERM CURRENT USE OF INSULIN: Primary | ICD-10-CM

## 2020-05-29 DIAGNOSIS — Z79.4 TYPE 2 DIABETES MELLITUS WITH STABLE PROLIFERATIVE RETINOPATHY OF BOTH EYES, WITH LONG-TERM CURRENT USE OF INSULIN: Primary | ICD-10-CM

## 2020-06-04 ENCOUNTER — DOCUMENT SCAN (OUTPATIENT)
Dept: HOME HEALTH SERVICES | Facility: HOSPITAL | Age: 65
End: 2020-06-04
Payer: MEDICARE

## 2020-06-04 PROCEDURE — 99499 UNLISTED E&M SERVICE: CPT | Mod: ,,, | Performed by: OTOLARYNGOLOGY

## 2020-06-04 PROCEDURE — 99499 NO LOS: ICD-10-PCS | Mod: ,,, | Performed by: OTOLARYNGOLOGY

## 2020-06-08 ENCOUNTER — TELEPHONE (OUTPATIENT)
Dept: INTERNAL MEDICINE | Facility: CLINIC | Age: 65
End: 2020-06-08

## 2020-06-08 NOTE — TELEPHONE ENCOUNTER
----- Message from Moni Newton sent at 6/8/2020  4:33 PM CDT -----  Contact: pt   States he's calling rg the CGM and dexacom and the pump for the system they want to add and wants to discuss with the Dr and can be reached at [phone]//thanks/dbw

## 2020-06-10 ENCOUNTER — LAB VISIT (OUTPATIENT)
Dept: LAB | Facility: HOSPITAL | Age: 65
End: 2020-06-10
Attending: FAMILY MEDICINE
Payer: MEDICARE

## 2020-06-10 DIAGNOSIS — Z94.0 KIDNEY REPLACED BY TRANSPLANT: ICD-10-CM

## 2020-06-10 LAB
ALBUMIN SERPL BCP-MCNC: 3.4 G/DL (ref 3.5–5.2)
ALBUMIN SERPL BCP-MCNC: 3.4 G/DL (ref 3.5–5.2)
ALP SERPL-CCNC: 83 U/L (ref 55–135)
ALT SERPL W/O P-5'-P-CCNC: 36 U/L (ref 10–44)
ANION GAP SERPL CALC-SCNC: 13 MMOL/L (ref 8–16)
AST SERPL-CCNC: 23 U/L (ref 10–40)
BASOPHILS # BLD AUTO: 0.04 K/UL (ref 0–0.2)
BASOPHILS NFR BLD: 0.8 % (ref 0–1.9)
BILIRUB DIRECT SERPL-MCNC: 0.3 MG/DL (ref 0.1–0.3)
BILIRUB SERPL-MCNC: 0.6 MG/DL (ref 0.1–1)
BUN SERPL-MCNC: 17 MG/DL (ref 8–23)
CALCIUM SERPL-MCNC: 9.1 MG/DL (ref 8.7–10.5)
CHLORIDE SERPL-SCNC: 104 MMOL/L (ref 95–110)
CO2 SERPL-SCNC: 22 MMOL/L (ref 23–29)
CREAT SERPL-MCNC: 1.6 MG/DL (ref 0.5–1.4)
DIFFERENTIAL METHOD: ABNORMAL
EOSINOPHIL # BLD AUTO: 0.1 K/UL (ref 0–0.5)
EOSINOPHIL NFR BLD: 1.7 % (ref 0–8)
ERYTHROCYTE [DISTWIDTH] IN BLOOD BY AUTOMATED COUNT: 15.1 % (ref 11.5–14.5)
EST. GFR  (AFRICAN AMERICAN): 51.1 ML/MIN/1.73 M^2
EST. GFR  (NON AFRICAN AMERICAN): 44.2 ML/MIN/1.73 M^2
GLUCOSE SERPL-MCNC: 161 MG/DL (ref 70–110)
HCT VFR BLD AUTO: 49.2 % (ref 40–54)
HGB BLD-MCNC: 14.8 G/DL (ref 14–18)
IMM GRANULOCYTES # BLD AUTO: 0.01 K/UL (ref 0–0.04)
IMM GRANULOCYTES NFR BLD AUTO: 0.2 % (ref 0–0.5)
LYMPHOCYTES # BLD AUTO: 1.5 K/UL (ref 1–4.8)
LYMPHOCYTES NFR BLD: 28.2 % (ref 18–48)
MCH RBC QN AUTO: 28 PG (ref 27–31)
MCHC RBC AUTO-ENTMCNC: 30.1 G/DL (ref 32–36)
MCV RBC AUTO: 93 FL (ref 82–98)
MONOCYTES # BLD AUTO: 0.6 K/UL (ref 0.3–1)
MONOCYTES NFR BLD: 12.1 % (ref 4–15)
NEUTROPHILS # BLD AUTO: 3 K/UL (ref 1.8–7.7)
NEUTROPHILS NFR BLD: 57 % (ref 38–73)
NRBC BLD-RTO: 0 /100 WBC
PHOSPHATE SERPL-MCNC: 3.2 MG/DL (ref 2.7–4.5)
PLATELET # BLD AUTO: 204 K/UL (ref 150–350)
PMV BLD AUTO: 10.8 FL (ref 9.2–12.9)
POTASSIUM SERPL-SCNC: 3.7 MMOL/L (ref 3.5–5.1)
PROT SERPL-MCNC: 7 G/DL (ref 6–8.4)
RBC # BLD AUTO: 5.29 M/UL (ref 4.6–6.2)
SODIUM SERPL-SCNC: 139 MMOL/L (ref 136–145)
WBC # BLD AUTO: 5.21 K/UL (ref 3.9–12.7)

## 2020-06-10 PROCEDURE — 80197 ASSAY OF TACROLIMUS: CPT | Mod: HCNC

## 2020-06-10 PROCEDURE — 85025 COMPLETE CBC W/AUTO DIFF WBC: CPT | Mod: HCNC

## 2020-06-10 PROCEDURE — 87799 DETECT AGENT NOS DNA QUANT: CPT | Mod: HCNC

## 2020-06-10 PROCEDURE — 80069 RENAL FUNCTION PANEL: CPT | Mod: HCNC

## 2020-06-10 PROCEDURE — 84075 ASSAY ALKALINE PHOSPHATASE: CPT | Mod: HCNC

## 2020-06-11 LAB — TACROLIMUS BLD-MCNC: 6.7 NG/ML (ref 5–15)

## 2020-06-12 ENCOUNTER — TELEPHONE (OUTPATIENT)
Dept: TRANSPLANT | Facility: CLINIC | Age: 65
End: 2020-06-12

## 2020-06-12 NOTE — TELEPHONE ENCOUNTER
Patient repeated back and voice a understanding of orders.  Patient will re submit urine on 6/16/2020.    ----- Message from Tiana Blanco MD sent at 6/11/2020  5:07 PM CDT -----  The following message sent to patient via [facility]:  Significant spilling of protein noted.  Let us know if you are having any new symptoms or health changes.  I recommend repeating this urine protein/creatinine ratio in the next few weeks.  If protein spilling persists, I will recommend repeat biopsy.

## 2020-06-16 ENCOUNTER — LAB VISIT (OUTPATIENT)
Dept: LAB | Facility: HOSPITAL | Age: 65
End: 2020-06-16
Payer: MEDICARE

## 2020-06-16 DIAGNOSIS — Z94.0 KIDNEY REPLACED BY TRANSPLANT: ICD-10-CM

## 2020-06-16 LAB
BKV DNA SERPL NAA+PROBE-ACNC: <125 COPIES/ML
BKV DNA SERPL NAA+PROBE-LOG#: <2.1 LOG (10) COPIES/ML
BKV DNA SERPL QL NAA+PROBE: NOT DETECTED
CREAT UR-MCNC: 57 MG/DL (ref 23–375)
PROT UR-MCNC: 113 MG/DL (ref 0–15)
PROT/CREAT UR: 1.98 MG/G{CREAT} (ref 0–0.2)

## 2020-06-16 PROCEDURE — 84156 ASSAY OF PROTEIN URINE: CPT | Mod: HCNC

## 2020-06-17 ENCOUNTER — TELEPHONE (OUTPATIENT)
Dept: TRANSPLANT | Facility: CLINIC | Age: 65
End: 2020-06-17

## 2020-06-17 NOTE — TELEPHONE ENCOUNTER
----- Message from Tiana Blanco MD sent at 6/17/2020  2:08 PM CDT -----  The following message sent to patient via [facility]:  You are spilling more protein than in the past.  This may be a consequence of your prior rejection episodes.  It is very important that you keep your blood sugars and blood pressures under the best control possible.

## 2020-06-17 NOTE — TELEPHONE ENCOUNTER
Results reviewed with patient. Pt states does not want to come to [place] at present for follow up due to COVID-19 and other health concerns. Appointment made with Dr. Estrada 08/12/2020. Pt to keep coordinator posted.

## 2020-06-22 ENCOUNTER — TELEPHONE (OUTPATIENT)
Dept: NEPHROLOGY | Facility: CLINIC | Age: 65
End: 2020-06-22

## 2020-06-22 DIAGNOSIS — Z94.0 S/P KIDNEY TRANSPLANT: Primary | Chronic | ICD-10-CM

## 2020-06-22 NOTE — TELEPHONE ENCOUNTER
----- Message from Bud Tam MD sent at 6/22/2020  2:04 PM CDT -----  I could see him at the end of June.     Dick    ----- Message -----  From: Janna Sorto LPN  Sent: 6/22/2020  12:47 PM CDT  To: Avel Estrada MD    Our first available appt is august .  ----- Message -----  From: Avel Estrada MD  Sent: 6/20/2020  12:26 PM CDT  To: Sean PORTILLO Staff      ----- Message -----  From: Tiana Blanco MD  Sent: 6/20/2020  10:53 AM CDT  To: Avel Estrada MD, Chandrakant Poole, RN    We can adjust IS for his skin cancer w/o him coming to [facility] more than once a year to every other year, as long as he sees Dr. Estrada.  I'll cc [facility] so he is aware that patient will be followed long distance by us d/t transportation.  Ambika  ----- Message -----  From: Chandrakant Poole, RN  Sent: 6/18/2020  10:23 AM CDT  To: Tiana Blanco MD    How often do we want to do labs on him ?  He does not want to come to [facility] at the moment, he is also schedule to see Dr Estrada in [place] in August .     ----- Message -----  From: Tiana Blanco MD  Sent: 6/17/2020   6:37 PM CDT  To: Chandrakant Poole, RN    I don't recall that he has such bad squamous cell ca to need XRT?!  Probably my bad memory...  Anyhow, we should get cylex w next lab to see if there is room to lower net IS.  ----- Message -----  From: Reanna Robin RN  Sent: 6/17/2020   2:45 PM CDT  To: Tiana Blanco MD    Hi Dr Boston,   Mr Ladd received radiation to his [facility] from 10/31/2019-11/27/2019. The call was to reschedule his f/u appt with the Radiation Oncologist.  ----- Message -----  From: Tiana Blanco MD  Sent: 6/17/2020   2:04 PM CDT  To: Reanna Robin RN, #    Hello Ms. Lobito,  I follow this patient for his kidney transplant. I am reviewing his chart to see how he has been doing. I saw a note that you tried calling him in March, but got no answer.  I'm wondering what he needs  radiation oncology team for? If he needs radioation therapy, that may affect his immunosuppression.  Ambika

## 2020-06-22 NOTE — TELEPHONE ENCOUNTER
----- Message from Bud Tam MD sent at 6/22/2020  2:04 PM CDT -----  I could see him at the end of June.     Dick    ----- Message -----  From: Janna Sorto LPN  Sent: 6/22/2020  12:47 PM CDT  To: Avel Estrada MD    Our first available appt is august .  ----- Message -----  From: Avel Estrada MD  Sent: 6/20/2020  12:26 PM CDT  To: Sean PORTILLO Staff      ----- Message -----  From: Tiana Blanco MD  Sent: 6/20/2020  10:53 AM CDT  To: Avel Estrada MD, Chandrakant Poole, RN    We can adjust IS for his skin cancer w/o him coming to [facility] more than once a year to every other year, as long as he sees Dr. Estrada.  I'll cc [facility] so he is aware that patient will be followed long distance by us d/t transportation.  Ambika  ----- Message -----  From: Chandrakant Poole, RN  Sent: 6/18/2020  10:23 AM CDT  To: Tiana Blanco MD    How often do we want to do labs on him ?  He does not want to come to [facility] at the moment, he is also schedule to see Dr Estrada in [place] in August .     ----- Message -----  From: Tiana Blanco MD  Sent: 6/17/2020   6:37 PM CDT  To: Chandrakant Poole, RN    I don't recall that he has such bad squamous cell ca to need XRT?!  Probably my bad memory...  Anyhow, we should get cylex w next lab to see if there is room to lower net IS.  ----- Message -----  From: Reanna Robin RN  Sent: 6/17/2020   2:45 PM CDT  To: Tiana Blanco MD    Hi Dr Boston,   Mr Ladd received radiation to his [facility] from 10/31/2019-11/27/2019. The call was to reschedule his f/u appt with the Radiation Oncologist.  ----- Message -----  From: Tiana Blanco MD  Sent: 6/17/2020   2:04 PM CDT  To: Reanna Robin RN, #    Hello Ms. Lobito,  I follow this patient for his kidney transplant. I am reviewing his chart to see how he has been doing. I saw a note that you tried calling him in March, but got no answer.  I'm wondering what he needs  radiation oncology team for? If he needs radioation therapy, that may affect his immunosuppression.  Ambika

## 2020-06-24 ENCOUNTER — HOSPITAL ENCOUNTER (OUTPATIENT)
Dept: RADIOLOGY | Facility: HOSPITAL | Age: 65
Discharge: HOME | End: 2020-06-24
Attending: INTERNAL MEDICINE
Payer: MEDICARE

## 2020-06-24 ENCOUNTER — OFFICE VISIT (OUTPATIENT)
Dept: CARDIOLOGY | Facility: CLINIC | Age: 65
End: 2020-06-24
Payer: MEDICARE

## 2020-06-24 VITALS
SYSTOLIC BLOOD PRESSURE: 147 MMHG | BODY MASS INDEX: 32.53 KG/M2 | HEIGHT: 71 IN | WEIGHT: 232.38 LBS | DIASTOLIC BLOOD PRESSURE: 87 MMHG | OXYGEN SATURATION: 98 % | HEART RATE: 83 BPM

## 2020-06-24 DIAGNOSIS — E78.2 MIXED HYPERLIPIDEMIA: ICD-10-CM

## 2020-06-24 DIAGNOSIS — J44.9 CHRONIC OBSTRUCTIVE PULMONARY DISEASE, UNSPECIFIED COPD TYPE: ICD-10-CM

## 2020-06-24 DIAGNOSIS — N18.30 STAGE 3 CHRONIC KIDNEY DISEASE: ICD-10-CM

## 2020-06-24 DIAGNOSIS — I13.10 MALIGNANT HTN WITH HEART DISEASE, W/O CHF, WITH CHRONIC KIDNEY DISEASE: ICD-10-CM

## 2020-06-24 DIAGNOSIS — I25.10 CAD IN NATIVE ARTERY: Primary | ICD-10-CM

## 2020-06-24 DIAGNOSIS — Z79.4 TYPE 2 DIABETES MELLITUS WITH HYPERGLYCEMIA, WITH LONG-TERM CURRENT USE OF INSULIN: ICD-10-CM

## 2020-06-24 DIAGNOSIS — I73.9 PERIPHERAL VASCULAR DISEASE: ICD-10-CM

## 2020-06-24 DIAGNOSIS — I50.9 CENTRAL SLEEP APNEA SECONDARY TO CONGESTIVE HEART FAILURE (CHF): ICD-10-CM

## 2020-06-24 DIAGNOSIS — G47.37 CENTRAL SLEEP APNEA SECONDARY TO CONGESTIVE HEART FAILURE (CHF): ICD-10-CM

## 2020-06-24 DIAGNOSIS — I12.9 RENAL HYPERTENSION: Chronic | ICD-10-CM

## 2020-06-24 DIAGNOSIS — I10 ESSENTIAL HYPERTENSION: ICD-10-CM

## 2020-06-24 DIAGNOSIS — I44.1 MOBITZ TYPE 1 SECOND DEGREE ATRIOVENTRICULAR BLOCK: ICD-10-CM

## 2020-06-24 DIAGNOSIS — R60.0 LOCALIZED EDEMA: ICD-10-CM

## 2020-06-24 DIAGNOSIS — I12.9 RENAL HYPERTENSION: ICD-10-CM

## 2020-06-24 DIAGNOSIS — I25.118 CORONARY ARTERY DISEASE OF NATIVE ARTERY OF NATIVE HEART WITH STABLE ANGINA PECTORIS: ICD-10-CM

## 2020-06-24 DIAGNOSIS — E11.65 TYPE 2 DIABETES MELLITUS WITH HYPERGLYCEMIA, WITH LONG-TERM CURRENT USE OF INSULIN: ICD-10-CM

## 2020-06-24 DIAGNOSIS — Z94.0 S/P KIDNEY TRANSPLANT: Chronic | ICD-10-CM

## 2020-06-24 DIAGNOSIS — I25.2 OLD MI (MYOCARDIAL INFARCTION): ICD-10-CM

## 2020-06-24 DIAGNOSIS — I25.10 CAD IN NATIVE ARTERY: ICD-10-CM

## 2020-06-24 PROCEDURE — 99999 PR PBB SHADOW E&M-EST. PATIENT-LVL III: CPT | Mod: PBBFAC,HCNC,, | Performed by: INTERNAL MEDICINE

## 2020-06-24 PROCEDURE — 1159F PR MEDICATION LIST DOCUMENTED IN MEDICAL RECORD: ICD-10-PCS | Mod: HCNC,S$GLB,, | Performed by: INTERNAL MEDICINE

## 2020-06-24 PROCEDURE — 1126F AMNT PAIN NOTED NONE PRSNT: CPT | Mod: HCNC,S$GLB,, | Performed by: INTERNAL MEDICINE

## 2020-06-24 PROCEDURE — 1100F PR PT FALLS ASSESS DOC 2+ FALLS/FALL W/INJURY/YR: ICD-10-PCS | Mod: HCNC,CPTII,S$GLB, | Performed by: INTERNAL MEDICINE

## 2020-06-24 PROCEDURE — 3077F SYST BP >= 140 MM HG: CPT | Mod: HCNC,CPTII,S$GLB, | Performed by: INTERNAL MEDICINE

## 2020-06-24 PROCEDURE — 3051F PR MOST RECENT HEMOGLOBIN A1C LEVEL 7.0 - < 8.0%: ICD-10-PCS | Mod: HCNC,CPTII,S$GLB, | Performed by: INTERNAL MEDICINE

## 2020-06-24 PROCEDURE — 71046 X-RAY EXAM CHEST 2 VIEWS: CPT | Mod: TC,HCNC

## 2020-06-24 PROCEDURE — 3288F FALL RISK ASSESSMENT DOCD: CPT | Mod: HCNC,CPTII,S$GLB, | Performed by: INTERNAL MEDICINE

## 2020-06-24 PROCEDURE — 1159F MED LIST DOCD IN RCRD: CPT | Mod: HCNC,S$GLB,, | Performed by: INTERNAL MEDICINE

## 2020-06-24 PROCEDURE — 3077F PR MOST RECENT SYSTOLIC BLOOD PRESSURE >= 140 MM HG: ICD-10-PCS | Mod: HCNC,CPTII,S$GLB, | Performed by: INTERNAL MEDICINE

## 2020-06-24 PROCEDURE — 99214 PR OFFICE/OUTPT VISIT, EST, LEVL IV, 30-39 MIN: ICD-10-PCS | Mod: HCNC,S$GLB,, | Performed by: INTERNAL MEDICINE

## 2020-06-24 PROCEDURE — 99999 PR PBB SHADOW E&M-EST. PATIENT-LVL III: ICD-10-PCS | Mod: PBBFAC,HCNC,, | Performed by: INTERNAL MEDICINE

## 2020-06-24 PROCEDURE — 99499 UNLISTED E&M SERVICE: CPT | Mod: HCNC,S$GLB,, | Performed by: INTERNAL MEDICINE

## 2020-06-24 PROCEDURE — 3051F HG A1C>EQUAL 7.0%<8.0%: CPT | Mod: HCNC,CPTII,S$GLB, | Performed by: INTERNAL MEDICINE

## 2020-06-24 PROCEDURE — 3288F PR FALLS RISK ASSESSMENT DOCUMENTED: ICD-10-PCS | Mod: HCNC,CPTII,S$GLB, | Performed by: INTERNAL MEDICINE

## 2020-06-24 PROCEDURE — 3079F PR MOST RECENT DIASTOLIC BLOOD PRESSURE 80-89 MM HG: ICD-10-PCS | Mod: HCNC,CPTII,S$GLB, | Performed by: INTERNAL MEDICINE

## 2020-06-24 PROCEDURE — 71046 XR CHEST PA AND LATERAL: ICD-10-PCS | Mod: 26,HCNC,, | Performed by: RADIOLOGY

## 2020-06-24 PROCEDURE — 71046 X-RAY EXAM CHEST 2 VIEWS: CPT | Mod: 26,HCNC,, | Performed by: RADIOLOGY

## 2020-06-24 PROCEDURE — 3079F DIAST BP 80-89 MM HG: CPT | Mod: HCNC,CPTII,S$GLB, | Performed by: INTERNAL MEDICINE

## 2020-06-24 PROCEDURE — 99499 RISK ADDL DX/OHS AUDIT: ICD-10-PCS | Mod: HCNC,S$GLB,, | Performed by: INTERNAL MEDICINE

## 2020-06-24 PROCEDURE — 1100F PTFALLS ASSESS-DOCD GE2>/YR: CPT | Mod: HCNC,CPTII,S$GLB, | Performed by: INTERNAL MEDICINE

## 2020-06-24 PROCEDURE — 99214 OFFICE O/P EST MOD 30 MIN: CPT | Mod: HCNC,S$GLB,, | Performed by: INTERNAL MEDICINE

## 2020-06-24 PROCEDURE — 1126F PR PAIN SEVERITY QUANTIFIED, NO PAIN PRESENT: ICD-10-PCS | Mod: HCNC,S$GLB,, | Performed by: INTERNAL MEDICINE

## 2020-06-24 RX ORDER — FUROSEMIDE 40 MG/1
40 TABLET ORAL DAILY PRN
Qty: 30 TABLET | Refills: 11 | Status: SHIPPED | OUTPATIENT
Start: 2020-06-24 | End: 2020-12-03 | Stop reason: SDUPTHER

## 2020-06-24 NOTE — PROGRESS NOTES
Subjective:   Patient ID:  Lavelle Ladd is a 64 y.o. male who presents for cardiac consult of Follow-up      HPI  The patient came in today for cardiac consult of Follow-up    Lavelle Ladd is a 64 y.o. male pt with CAD s/p mult PCI, Pt has h/o CRI, DM, HTN, HLP, PAD, right BKA, transmetatarsal amputation of left foot, renal transplant in May 2016, COPD,  CAD and immunosuppressed post transplant here for CV follow up.      8/5/19 visit with Amol Alas, pt of Dr. Valdez  He presents to clinic today for hospital follow up. Presented to [facility] on 7/21/19 with acute anterolateral and inferior STEMI. Cath showed occluded apical LAD, diffuse non obstructive CAD elsewhere. Medically managed.  Patient hydrated with IVF the day after angiogram due to bump in creatine to 1.7. No ACEI ordered upon DC.     9/11/19  Pt is here for pre-op CV eval - scheduled for surgery (Skin excision) for skin cancer left temple on 9/27/19 with . Last [facility] 7/19 medically managed post STEMI. No CP/SOB.     12/11/19  He is s/p surgery and 20 rounds of radiation. He has been having more nausea, and has ear ache along with SOB and L arm pain. He has been having low blood sugars.   He has been getting wound care at home with PT/OT. He has constant dull type aching pain. He has taken pain meds, takes Norco.     1/15/20  Nuclear stress neg after last visit. Earlier this year was admitted for B/L PNA. He went to the ER about 10 days ago - evaluation of SOB with sleeping which onset gradually over the last 2 hours. Pt was evaluated at the ED 2 hours ago and discharged with Levalbuterol nebulizer solution and vistaril 25 mg. Pt reports that when he tried to fall asleep, he suddenly cannot breathe causing anxiety with sleep. Pt was referred to pulmonology earlier today. Symptoms are episodic and moderate in severity. Sleep increases SOB.  BNP was mildly elevated referred to pulm. He was given IV lasix and has diuresed. He feels almost  back to baseline. He also has PVD in arms will see vasc sx.     4/22/20  He still has nocturnal SOB, occ intermittent chest pain. He is more swollen lately discussed will increase lasix to 40 mg PRN. Weight flucuates, now 220. He takes Norco for pain breathing has improved with dec pain meds. BP has been well controlled per pt. Pulse has been good. Doing well with Ozempic and blood sugars improved.     6/24/20  Lasix increased to 40mg last visit. BP has been stable. HE feels overall lousy, has issues with renal transplant will see nephro soon. He makes good urine. He still has more edema has issues getting prosthetic on. HE does eat outside. Has significant pain legs/back. He also has more SOB.     Patient feels no leg swelling, no PND, no palpitation, no dizziness, no syncope, no CNS symptoms.    Patient is compliant with medications.    Nuclear Quantitative Functional Analysis:   LVEF: 69 %    Impression: NORMAL MYOCARDIAL PERFUSION  1. The perfusion scan is free of evidence for myocardial ischemia or injury. The inferior wall is obscured by bowel.  2. Resting wall motion is physiologic.   3. Resting LV function is normal.   4. The ventricular volumes are normal at rest and stress.   5. The extracardiac distribution of radioactivity is normal.   6. When compared to the previous study from 02/20/2015, no ischecmic changes.     This document has been electronically    SIGNED BY: Michael Contreras MD On: 12/18/2019 16:02    CONCLUSIONS     1 - Normal left ventricular systolic function (EF 60-65%).     2 - Indeterminate LV diastolic function.     3 - Normal right ventricular systolic function .     4 - Wall motion abnormalities.     5 - Moderate left atrial enlargement.       This document has been electronically    SIGNED BY: Andrew Valdez MD On: 07/21/2019 11:21    7/21/19 [facility]  1. Routine post-cath care.  2. Recommend maximal medical management for apical LAD occlusion (small tortuous vessel and very distal occlusion).    3. Risk factor reductions.  4. ASA 81mg.  5. Clopidogrel (Plavix) for one year.  6. Maximize medical tx for PAD.    Past Medical History:   Diagnosis Date    DINORAH (acute kidney injury) 2016    Arthritis     CAD in native artery 2019    CHF (congestive heart failure)     Chronic obstructive pulmonary disease 2016    Coronary artery disease involving native coronary artery of native heart without angina pectoris 2016    CRI (chronic renal insufficiency) 2019     donor kidney transplant for DM 16     Induction with Thymo x3 and IV solumedrol to total 875mg  Kidney Biopsy  2016: 16 glomeruli, ACR type 1 AVR type 2, significant microcirculatory changes, c4d negative, No DSA, 5 to10% fibrosis. Treated with thymo x8 2016- no rejection      Diastolic heart failure     Encounter for blood transfusion     ESRD on RRT since 10/2013 10/29/2013    Biopsy proven diabetic nephropathy and lymphoplasmacytic interstitial infiltrate not c/w with AIN (ddx sjogrens or assoc with tamm-horsefall protein extravasation)     GERD (gastroesophageal reflux disease)     History of hepatitis C, s/p successful Rx w/ SVR12 - 2017    Completed 12 weeks harvoni w/ SVR    Hyperlipidemia     Hypertension     PAD (peripheral artery disease) 2019    PIC line (peripherally inserted central catheter) flush     Prophylactic immunotherapy     Proteinuria     PVD (peripheral vascular disease) 2017    RLE BKA CT 16 Extensive atherosclerotic disease of the aorta and mesenteric arteries.     Renal hypertension     Type 2 diabetes mellitus with diabetic neuropathy, with long-term current use of insulin 2016    Vitamin B12 deficiency        Past Surgical History:   Procedure Laterality Date    AORTOGRAPHY WITH SERIALOGRAPHY N/A 2018    Procedure: LEFT LEG ANGIOGRAM;  Surgeon: Donal Mcdonald MD;  Location: [facility] CATH LAB;  Service: Vascular;  Laterality: N/A;     av bovine graft      Left UE    AV FISTULA PLACEMENT      left UE    CARDIAC CATHETERIZATION  02/2015    CLOSURE OF WOUND Left 9/24/2018    Procedure: CLOSURE, WOUND;  Surgeon: Karla Wheeler DPM;  Location: [facility] OR;  Service: Podiatry;  Laterality: Left;  Secondary Wound closure, extensive    CLOSURE OF WOUND Left 11/5/2018    Procedure: CLOSURE, WOUND;  Surgeon: Karla Wheeler DPM;  Location: [facility] OR;  Service: Podiatry;  Laterality: Left;    DEBRIDEMENT OF MULTIPLE METATARSAL BONES Left 11/5/2018    Procedure: DEBRIDEMENT, METATARSAL BONE, 2 OR MORE BONES;  Surgeon: Karla Wheeler DPM;  Location: [facility] OR;  Service: Podiatry;  Laterality: Left;    EXCISION OF SKIN Left 9/27/2019    Procedure: EXCISION, SKIN;  Surgeon: Lenard Alarcon MD;  Location: [address];  Service: Plastics;  Laterality: Left;  Plastics set, NIMS monitor, [employer]    FOOT AMPUTATION THROUGH METATARSAL Left 9/21/2018    Procedure: AMPUTATION, FOOT, TRANSMETATARSAL;  Surgeon: Karla Wheeler DPM;  Location: [facility] OR;  Service: Podiatry;  Laterality: Left;    FOOT AMPUTATION THROUGH METATARSAL Left 10/31/2018    Procedure: AMPUTATION, FOOT, TRANSMETATARSAL;  Surgeon: Karla Wheeler DPM;  Location: [facility] OR;  Service: Podiatry;  Laterality: Left;    FOOT AMPUTATION THROUGH METATARSAL Left 11/5/2018    Procedure: AMPUTATION, FOOT, TRANSMETATARSAL;  Surgeon: Karla Wheeler DPM;  Location: [facility] OR;  Service: Podiatry;  Laterality: Left;  revisional transmetatarsal amputation, Left foot    IRRIGATION AND DEBRIDEMENT OF LOWER EXTREMITY Left 10/31/2018    Procedure: IRRIGATION AND DEBRIDEMENT, LOWER EXTREMITY;  Surgeon: Karla Wheeler DPM;  Location: [facility] OR;  Service: Podiatry;  Laterality: Left;    KIDNEY TRANSPLANT  05/21/2016    LEFT HEART CATHETERIZATION Left 7/21/2019    Procedure: CATHETERIZATION, HEART, LEFT;  Surgeon: Andrew Valdez MD;  Location: [facility] CATH LAB;  Service: Cardiology;   Laterality: Left;    LEG AMPUTATION THROUGH KNEE      right LE, started as nail puncture leading to diabetic ulcer    SKIN FULL THICKNESS GRAFT Left 10/7/2019    Procedure: APPLICATION, GRAFT, SKIN, FULL-THICKNESS;  Surgeon: Lenard Alarcon MD;  Location: [facility] OR [address] [place] [address];  Service: Plastics;  Laterality: Left;    SURGICAL REMOVAL OF LESION OF FACE Right 10/7/2019    Procedure: EXCISION, LESION, FACE;  Surgeon: Lenard Alarcon MD;  Location: [facility] OR [address] [place] [address];  Service: Plastics;  Laterality: Right;       Social History     Tobacco Use    Smoking status: Former Smoker     Packs/day: 1.00     Years: 40.00     Pack years: 40.00     Quit date: 2013     Years since quittin.4    Smokeless tobacco: Never Used   Substance Use Topics    Alcohol use: Yes     Alcohol/week: 6.0 standard drinks     Types: 6 Cans of beer per week     Comment: seldom    Drug use: No       Family History   Problem Relation Age of Onset    Cancer Father     Diabetes Father     Heart failure Father     Stroke Father     Heart failure Mother     Kidney disease Neg Hx        Patient's Medications   New Prescriptions    No medications on file   Previous Medications    AMLODIPINE (NORVASC) 10 MG TABLET    Take 1 tablet (10 mg total) by mouth once daily.    ASPIRIN (ECOTRIN) 81 MG EC TABLET    Take 1 tablet (81 mg total) by mouth once daily.    ATORVASTATIN (LIPITOR) 80 MG TABLET    Take 1 tablet (80 mg total) by mouth once daily.    BD INSULIN SYRINGE ULTRA-FINE 1 ML 31 GAUGE X 5/16 SYRG    USE ONE AS DIRECTED FOUR TIMES DAILY WITH MEALS AND AT BEDTIME    BLOOD SUGAR DIAGNOSTIC STRP    1 each by Misc.(Non-Drug; Combo Route) route 3 (three) times daily.    BLOOD-GLUCOSE METER KIT    Use as instructed    BLOOD-GLUCOSE METER,CONTINUOUS (DEXCOM G6 ) MISC    Check sugars at least 3 times a day    BLOOD-GLUCOSE SENSOR (DEXCOM G6 SENSOR) AURELIO    Check sugars 3 times a day    CLOPIDOGREL (PLAVIX) 75 MG TABLET    Take 1 tablet (75  mg total) by mouth once daily.    DICLOFENAC (FLECTOR) 1.3 % PT12    Apply 1 packet topically once daily.    ERGOCALCIFEROL (ERGOCALCIFEROL) 50,000 UNIT CAP    Take 1 capsule (50,000 Units total) by mouth every 7 days. Take on Mondays    FLASH GLUCOSE SCANNING READER (FREESTYLE BRYAN 14 DAY READER) MISC    1 Device by Misc.(Non-Drug; Combo Route) route as needed.    FLASH GLUCOSE SENSOR (FREESTYLE BRYAN 14 DAY SENSOR) KIT    1 kit by Misc.(Non-Drug; Combo Route) route every 14 (fourteen) days.    FLUAD 6528-1141, 65 YR UP,,PF, 45 MCG (15 MCG X 3)/0.5 ML SYRG        FLUOROURACIL (EFUDEX) 5 % CREAM    Apply topically 2 (two) times daily. For 3 weeks. Will cause redness and irritation.    FUROSEMIDE (LASIX) 40 MG TABLET    Take 1 tablet (40 mg total) by mouth daily as needed (Leg swelling, Nocturnal SOB).    GABAPENTIN (NEURONTIN) 300 MG CAPSULE        HYDROCODONE-ACETAMINOPHEN (NORCO)  MG PER TABLET    1 tablet by Per G Tube route every 6 (six) hours as needed for Pain.    HYDROXYZINE PAMOATE (VISTARIL) 25 MG CAP    Take 1 capsule (25 mg total) by mouth every 8 (eight) hours as needed (anxiety).    IPRATROPIUM (ATROVENT) 0.02 % NEBULIZER SOLUTION    Take 2.5 mLs (500 mcg total) by nebulization 4 (four) times daily.    ISOSORBIDE MONONITRATE (IMDUR) 30 MG 24 HR TABLET    Take 2 tablets (60 mg total) by mouth once daily.    LEVALBUTEROL (XOPENEX) 1.25 MG/3 ML NEBULIZER SOLUTION    Take 3 mLs (1.25 mg total) by nebulization every 6 to 8 hours as needed for Wheezing or Shortness of Breath.    METHYLPHENIDATE HCL (RITALIN) 10 MG TABLET    TAKE ONE TABLET BY MOUTH TWICE A DAY WITH MEALS    METOPROLOL TARTRATE (LOPRESSOR) 25 MG TABLET    Take 1 tablet (25 mg total) by mouth 2 (two) times daily.    MUPIROCIN (BACTROBAN) 2 % OINTMENT    Apply topically 3 (three) times daily.    MUPIROCIN CALCIUM 2% (BACTROBAN) 2 % CREAM    Apply topically 3 (three) times daily.    MYCOPHENOLATE (CELLCEPT) 500 MG TAB    TAKE ONE TABLET BY  "MOUTH TWICE DAILY    NALOXONE (NARCAN) 4 MG/ACTUATION SPRY    1 spray by Nasal route once.    NITROGLYCERIN (NITROSTAT) 0.4 MG SL TABLET    Place 1 tablet (0.4 mg total) under the tongue every 5 (five) minutes as needed.    ONDANSETRON (ZOFRAN-ODT) 4 MG TBDL    Take 1 tablet (4 mg total) by mouth every 8 (eight) hours as needed.    PEN NEEDLE, DIABETIC 32 GAUGE X 5/32" NDLE    1 each by Misc.(Non-Drug; Combo Route) route 4 (four) times daily. Urszula Weiser    PREDNISONE (DELTASONE) 5 MG TABLET    Take 1 tablet (5 mg total) by mouth once daily.    SEMAGLUTIDE (OZEMPIC) 0.25 MG OR 0.5 MG(2 MG/1.5 ML) PNIJ    Inject 0.5 mg into the skin every 7 days.    SEVELAMER CARBONATE (RENVELA) 800 MG TAB        TACROLIMUS (PROGRAF) 0.5 MG CAP    Take 3 capsules (1.5 mg total) by mouth every 12 (twelve) hours.    TRESIBA FLEXTOUCH U-100 100 UNIT/ML (3 ML) INPN    Inject 30 Units into the skin once daily.    VIOS AEROSOL DELIVERY SYSTEM AURELIO    USE Q 4 H PRN   Modified Medications    No medications on file   Discontinued Medications    No medications on file       Review of Systems   Constitutional: Positive for malaise/fatigue.   HENT: Negative.    Eyes: Negative.    Respiratory: Positive for shortness of breath.    Cardiovascular: Negative for chest pain.   Gastrointestinal: Negative.    Genitourinary: Negative.    Musculoskeletal: Positive for myalgias and neck pain.   Skin: Negative.    Neurological: Negative.    Endo/Heme/Allergies: Negative.    Psychiatric/Behavioral: Negative.    All 12 systems otherwise negative.      Wt Readings from Last 3 Encounters:   06/24/20 105.4 kg (232 lb 5.8 oz)   03/04/20 104.6 kg (230 lb 9.6 oz)   02/21/20 103.6 kg (228 lb 6.3 oz)     Temp Readings from Last 3 Encounters:   03/04/20 98 °F (36.7 °C) (Oral)   01/04/20 97.9 °F (36.6 °C) (Oral)   01/04/20 98 °F (36.7 °C) (Oral)     BP Readings from Last 3 Encounters:   06/24/20 (!) 147/87   03/04/20 130/84   02/21/20 (!) 154/92     Pulse Readings " "from Last 3 Encounters:   06/24/20 83   03/04/20 75   02/21/20 87       BP (!) 147/87 (BP Location: Left arm, Patient Position: Sitting, BP Method: Large (Manual))   Pulse 83   Ht 5' 11" (1.803 m)   Wt 105.4 kg (232 lb 5.8 oz)   SpO2 98%   BMI 32.41 kg/m²     Objective:   Physical Exam   Constitutional: He is oriented to person, place, and time. He appears well-developed and well-nourished. No distress.   HENT:   Head: Normocephalic and atraumatic.   Nose: Nose normal.   Mouth/Throat: Oropharynx is clear and moist.   Eyes: Conjunctivae and EOM are normal. No scleral icterus.   Neck: Normal range of motion. Neck supple. No JVD present. No thyromegaly present.   Cardiovascular: Normal rate, regular rhythm, S1 normal and S2 normal. Exam reveals no gallop, no S3, no S4 and no friction rub.   Murmur heard.  Pulmonary/Chest: Effort normal and breath sounds normal. No stridor. No respiratory distress. He has no wheezes. He has no rales. He exhibits no tenderness.   Abdominal: Soft. Bowel sounds are normal. He exhibits no distension and no mass. There is no abdominal tenderness. There is no rebound.   Genitourinary:    Genitourinary Comments: Deferred     Musculoskeletal: Normal range of motion.         General: Edema present. No tenderness or deformity.   Lymphadenopathy:     He has no cervical adenopathy.   Neurological: He is alert and oriented to person, place, and time. He exhibits normal muscle tone. Coordination normal.   Skin: Skin is warm and dry. No rash noted. He is not diaphoretic. No erythema. No pallor.   Psychiatric: He has a normal mood and affect. His behavior is normal. Judgment and thought content normal.   Nursing note and vitals reviewed.      Lab Results   Component Value Date     06/10/2020    K 3.7 06/10/2020     06/10/2020    CO2 22 (L) 06/10/2020    BUN 17 06/10/2020    CREATININE 1.6 (H) 06/10/2020     (H) 06/10/2020    HGBA1C 7.1 (H) 12/31/2019    MG 1.9 01/02/2020    AST " 23 06/10/2020    ALT 36 06/10/2020    ALBUMIN 3.4 (L) 06/10/2020    ALBUMIN 3.4 (L) 06/10/2020    PROT 7.0 06/10/2020    BILITOT 0.6 06/10/2020    WBC 5.21 06/10/2020    HGB 14.8 06/10/2020    HCT 49.2 06/10/2020    MCV 93 06/10/2020     06/10/2020    INR 1.0 2019    TSH 0.909 2019    CHOL 123 2019    HDL 45 2019    LDLCALC 59.4 (L) 2019    TRIG 93 2019     (H) 2020     Assessment:      1. CAD in native artery    2. Central sleep apnea secondary to congestive heart failure (CHF)    3. Mobitz type 1 second degree atrioventricular block    4. Old MI (myocardial infarction)    5. Mixed hyperlipidemia    6. Coronary artery disease of native artery of native heart with stable angina pectoris    7. Essential hypertension    8. Malignant HTN with heart disease, w/o CHF, with chronic kidney disease    9. Renal hypertension    10. Stage 3 chronic kidney disease    11.  donor kidney transplant for DM 16    12. Type 2 diabetes mellitus with hyperglycemia, with long-term current use of insulin    13. Chronic obstructive pulmonary disease, unspecified COPD type    14. Peripheral vascular disease        Plan:     1. HTN with edema and SOB  - cont meds  - lasix increased to 40mg for edema/swelling, extra dose PRN  - needs to decrease salt intake  - take extra lasix 40 x BID for 3 days  - order CXR    2. CAD   - pharm nuclear stress test - neg  - cont asa, statin, bb, plavix, imdur  - PRN NTG    3. HLD  - cont statin    4. CKD/ renal transplant   - cont to monitor, f/u nephro    5. DM2 - A1c 7.1  - cont meds per PCP    6. COPD  - cont tx per pulm    7. PVD  - cont meds  - f/u vasc sx    Thank you for allowing me to participate in this patient's care. Please do not hesitate to contact me with any questions or concerns. Consult note has been forwarded to the referral physician.     Micheal Contreras MD, [facility]  Cardiovascular Disease  [facility] [place]  [place]  [phone] (P)

## 2020-06-26 ENCOUNTER — LAB VISIT (OUTPATIENT)
Dept: LAB | Facility: HOSPITAL | Age: 65
End: 2020-06-26
Attending: INTERNAL MEDICINE
Payer: MEDICARE

## 2020-06-26 ENCOUNTER — TELEPHONE (OUTPATIENT)
Dept: DIABETES | Facility: CLINIC | Age: 65
End: 2020-06-26

## 2020-06-26 ENCOUNTER — OFFICE VISIT (OUTPATIENT)
Dept: DIABETES | Facility: CLINIC | Age: 65
End: 2020-06-26
Payer: MEDICARE

## 2020-06-26 VITALS — HEART RATE: 82 BPM | DIASTOLIC BLOOD PRESSURE: 100 MMHG | SYSTOLIC BLOOD PRESSURE: 160 MMHG

## 2020-06-26 DIAGNOSIS — I73.9 PERIPHERAL VASCULAR DISEASE: ICD-10-CM

## 2020-06-26 DIAGNOSIS — E11.65 TYPE 2 DIABETES MELLITUS WITH HYPERGLYCEMIA, WITH LONG-TERM CURRENT USE OF INSULIN: ICD-10-CM

## 2020-06-26 DIAGNOSIS — Z89.9 H/O AMPUTATION: ICD-10-CM

## 2020-06-26 DIAGNOSIS — E11.42 DIABETIC POLYNEUROPATHY ASSOCIATED WITH TYPE 2 DIABETES MELLITUS: ICD-10-CM

## 2020-06-26 DIAGNOSIS — Z94.0 S/P KIDNEY TRANSPLANT: Chronic | ICD-10-CM

## 2020-06-26 DIAGNOSIS — N18.30 STAGE 3 CHRONIC KIDNEY DISEASE: ICD-10-CM

## 2020-06-26 DIAGNOSIS — I25.10 CAD IN NATIVE ARTERY: ICD-10-CM

## 2020-06-26 DIAGNOSIS — K31.84 GASTROPARESIS: ICD-10-CM

## 2020-06-26 DIAGNOSIS — Z79.4 TYPE 2 DIABETES MELLITUS WITH HYPERGLYCEMIA, WITH LONG-TERM CURRENT USE OF INSULIN: Primary | ICD-10-CM

## 2020-06-26 DIAGNOSIS — E11.65 TYPE 2 DIABETES MELLITUS WITH HYPERGLYCEMIA, WITH LONG-TERM CURRENT USE OF INSULIN: Primary | ICD-10-CM

## 2020-06-26 DIAGNOSIS — Z94.0 S/P KIDNEY TRANSPLANT: ICD-10-CM

## 2020-06-26 DIAGNOSIS — I10 ESSENTIAL HYPERTENSION: ICD-10-CM

## 2020-06-26 DIAGNOSIS — E78.2 MIXED HYPERLIPIDEMIA: ICD-10-CM

## 2020-06-26 DIAGNOSIS — Z79.4 TYPE 2 DIABETES MELLITUS WITH HYPERGLYCEMIA, WITH LONG-TERM CURRENT USE OF INSULIN: ICD-10-CM

## 2020-06-26 DIAGNOSIS — E66.9 OBESITY (BMI 30-39.9): ICD-10-CM

## 2020-06-26 LAB
ALBUMIN SERPL BCP-MCNC: 3.4 G/DL (ref 3.5–5.2)
ANION GAP SERPL CALC-SCNC: 16 MMOL/L (ref 8–16)
BASOPHILS # BLD AUTO: 0.03 K/UL (ref 0–0.2)
BASOPHILS NFR BLD: 0.5 % (ref 0–1.9)
BUN SERPL-MCNC: 17 MG/DL (ref 8–23)
CALCIUM SERPL-MCNC: 9.1 MG/DL (ref 8.7–10.5)
CHLORIDE SERPL-SCNC: 98 MMOL/L (ref 95–110)
CO2 SERPL-SCNC: 24 MMOL/L (ref 23–29)
CREAT SERPL-MCNC: 1.8 MG/DL (ref 0.5–1.4)
DIFFERENTIAL METHOD: ABNORMAL
EOSINOPHIL # BLD AUTO: 0 K/UL (ref 0–0.5)
EOSINOPHIL NFR BLD: 0.5 % (ref 0–8)
ERYTHROCYTE [DISTWIDTH] IN BLOOD BY AUTOMATED COUNT: 14.7 % (ref 11.5–14.5)
EST. GFR  (AFRICAN AMERICAN): 44 ML/MIN/1.73 M^2
EST. GFR  (NON AFRICAN AMERICAN): 38 ML/MIN/1.73 M^2
ESTIMATED AVG GLUCOSE: 214 MG/DL (ref 68–131)
GLUCOSE SERPL-MCNC: 174 MG/DL (ref 70–110)
GLUCOSE SERPL-MCNC: 185 MG/DL (ref 70–110)
HBA1C MFR BLD HPLC: 9.1 % (ref 4–5.6)
HCT VFR BLD AUTO: 49.1 % (ref 40–54)
HGB BLD-MCNC: 15.5 G/DL (ref 14–18)
IMM GRANULOCYTES # BLD AUTO: 0.01 K/UL (ref 0–0.04)
IMM GRANULOCYTES NFR BLD AUTO: 0.2 % (ref 0–0.5)
LYMPHOCYTES # BLD AUTO: 1.7 K/UL (ref 1–4.8)
LYMPHOCYTES NFR BLD: 29.7 % (ref 18–48)
MCH RBC QN AUTO: 28.6 PG (ref 27–31)
MCHC RBC AUTO-ENTMCNC: 31.6 G/DL (ref 32–36)
MCV RBC AUTO: 91 FL (ref 82–98)
MONOCYTES # BLD AUTO: 0.6 K/UL (ref 0.3–1)
MONOCYTES NFR BLD: 11.5 % (ref 4–15)
NEUTROPHILS # BLD AUTO: 3.2 K/UL (ref 1.8–7.7)
NEUTROPHILS NFR BLD: 57.8 % (ref 38–73)
NRBC BLD-RTO: 0 /100 WBC
PHOSPHATE SERPL-MCNC: 3.3 MG/DL (ref 2.7–4.5)
PLATELET # BLD AUTO: 191 K/UL (ref 150–350)
PMV BLD AUTO: 10 FL (ref 9.2–12.9)
POTASSIUM SERPL-SCNC: 3.7 MMOL/L (ref 3.5–5.1)
PTH-INTACT SERPL-MCNC: 487 PG/ML (ref 9–77)
RBC # BLD AUTO: 5.42 M/UL (ref 4.6–6.2)
SODIUM SERPL-SCNC: 138 MMOL/L (ref 136–145)
WBC # BLD AUTO: 5.55 K/UL (ref 3.9–12.7)

## 2020-06-26 PROCEDURE — 3080F PR MOST RECENT DIASTOLIC BLOOD PRESSURE >= 90 MM HG: ICD-10-PCS | Mod: HCNC,CPTII,S$GLB, | Performed by: PHYSICIAN ASSISTANT

## 2020-06-26 PROCEDURE — 99499 RISK ADDL DX/OHS AUDIT: ICD-10-PCS | Mod: HCNC,S$GLB,, | Performed by: PHYSICIAN ASSISTANT

## 2020-06-26 PROCEDURE — 3077F SYST BP >= 140 MM HG: CPT | Mod: HCNC,CPTII,S$GLB, | Performed by: PHYSICIAN ASSISTANT

## 2020-06-26 PROCEDURE — 83036 HEMOGLOBIN GLYCOSYLATED A1C: CPT | Mod: HCNC

## 2020-06-26 PROCEDURE — 3077F PR MOST RECENT SYSTOLIC BLOOD PRESSURE >= 140 MM HG: ICD-10-PCS | Mod: HCNC,CPTII,S$GLB, | Performed by: PHYSICIAN ASSISTANT

## 2020-06-26 PROCEDURE — 80069 RENAL FUNCTION PANEL: CPT | Mod: HCNC

## 2020-06-26 PROCEDURE — 1159F PR MEDICATION LIST DOCUMENTED IN MEDICAL RECORD: ICD-10-PCS | Mod: HCNC,S$GLB,, | Performed by: PHYSICIAN ASSISTANT

## 2020-06-26 PROCEDURE — 82962 POCT GLUCOSE, HAND-HELD DEVICE: ICD-10-PCS | Mod: HCNC,S$GLB,, | Performed by: PHYSICIAN ASSISTANT

## 2020-06-26 PROCEDURE — 99999 PR PBB SHADOW E&M-EST. PATIENT-LVL V: CPT | Mod: PBBFAC,HCNC,, | Performed by: PHYSICIAN ASSISTANT

## 2020-06-26 PROCEDURE — 1126F PR PAIN SEVERITY QUANTIFIED, NO PAIN PRESENT: ICD-10-PCS | Mod: HCNC,S$GLB,, | Performed by: PHYSICIAN ASSISTANT

## 2020-06-26 PROCEDURE — 80197 ASSAY OF TACROLIMUS: CPT | Mod: HCNC

## 2020-06-26 PROCEDURE — 1101F PT FALLS ASSESS-DOCD LE1/YR: CPT | Mod: HCNC,CPTII,S$GLB, | Performed by: PHYSICIAN ASSISTANT

## 2020-06-26 PROCEDURE — 3080F DIAST BP >= 90 MM HG: CPT | Mod: HCNC,CPTII,S$GLB, | Performed by: PHYSICIAN ASSISTANT

## 2020-06-26 PROCEDURE — 99999 PR PBB SHADOW E&M-EST. PATIENT-LVL V: ICD-10-PCS | Mod: PBBFAC,HCNC,, | Performed by: PHYSICIAN ASSISTANT

## 2020-06-26 PROCEDURE — 1126F AMNT PAIN NOTED NONE PRSNT: CPT | Mod: HCNC,S$GLB,, | Performed by: PHYSICIAN ASSISTANT

## 2020-06-26 PROCEDURE — 3046F HEMOGLOBIN A1C LEVEL >9.0%: CPT | Mod: HCNC,CPTII,S$GLB, | Performed by: PHYSICIAN ASSISTANT

## 2020-06-26 PROCEDURE — 3046F PR MOST RECENT HEMOGLOBIN A1C LEVEL > 9.0%: ICD-10-PCS | Mod: HCNC,CPTII,S$GLB, | Performed by: PHYSICIAN ASSISTANT

## 2020-06-26 PROCEDURE — 99214 OFFICE O/P EST MOD 30 MIN: CPT | Mod: HCNC,S$GLB,, | Performed by: PHYSICIAN ASSISTANT

## 2020-06-26 PROCEDURE — 99214 PR OFFICE/OUTPT VISIT, EST, LEVL IV, 30-39 MIN: ICD-10-PCS | Mod: HCNC,S$GLB,, | Performed by: PHYSICIAN ASSISTANT

## 2020-06-26 PROCEDURE — 85025 COMPLETE CBC W/AUTO DIFF WBC: CPT | Mod: HCNC

## 2020-06-26 PROCEDURE — 82962 GLUCOSE BLOOD TEST: CPT | Mod: HCNC,S$GLB,, | Performed by: PHYSICIAN ASSISTANT

## 2020-06-26 PROCEDURE — 1159F MED LIST DOCD IN RCRD: CPT | Mod: HCNC,S$GLB,, | Performed by: PHYSICIAN ASSISTANT

## 2020-06-26 PROCEDURE — 83970 ASSAY OF PARATHORMONE: CPT | Mod: HCNC

## 2020-06-26 PROCEDURE — 36415 COLL VENOUS BLD VENIPUNCTURE: CPT | Mod: HCNC

## 2020-06-26 PROCEDURE — 99499 UNLISTED E&M SERVICE: CPT | Mod: HCNC,S$GLB,, | Performed by: PHYSICIAN ASSISTANT

## 2020-06-26 PROCEDURE — 1101F PR PT FALLS ASSESS DOC 0-1 FALLS W/OUT INJ PAST YR: ICD-10-PCS | Mod: HCNC,CPTII,S$GLB, | Performed by: PHYSICIAN ASSISTANT

## 2020-06-26 RX ORDER — INSULIN ASPART 100 [IU]/ML
INJECTION, SOLUTION INTRAVENOUS; SUBCUTANEOUS
Qty: 6 ML | Refills: 11 | Status: SHIPPED | OUTPATIENT
Start: 2020-06-26 | End: 2020-11-24 | Stop reason: SDUPTHER

## 2020-06-26 NOTE — PATIENT INSTRUCTIONS
Change Tresiba 18 units daily     Continue Ozempic 0.5 mg weekly     Novolog 5 units three times daily with meals if blood sugars > 300     Call me when Dexcom is shipping to you so I can set up training visit

## 2020-06-26 NOTE — PROGRESS NOTES
PCP: Yahir Calzada MD    Subjective:     Chief Complaint: Diabetes - Established Patient    HISTORY OF PRESENT ILLNESS: 64 y.o.   male presenting for diabetes management visit.   Patient has previously been established with the Diabetes Management Department and was last seen by myself on 02/21/20.  Patient has had Type II diabetes since 2010.  Pertinent to decision making is the following comorbidities: HTN, HLD, CAD, CHF, PVD, S/p Transplant - Kidney in 2016, Obesity by BMI, COPD and Immunocompromised  Patient has the following Diabetes complications: with diabetic chronic kidney disease, with diabetic polyneuropathy and with diabetic retinopathy;  He  has attended diabetes education in the past.     Patient's most recent A1c of 7.1% was completed 6 months ago.   Patient states since His last A1c His blood glucose levels have been both high and low throughout the day .    Patient monitors blood glucose 4 times daily and Continuously with CGM Fabrice.   Patient blood glucose monitoring device will not be uploaded into [employer] today. Patient forgot device today.   Patient endorses the following diabetes related symptoms: None.   Patient is due today for the following diabetes-related health maintenance standards: Eye Exam and A1c.   He denies any recent hospital admissions or emergency room visits.   He voices having hypoglycemia unawareness. Patient states he is having early am hypoglycemia.  Patient's concerns today include glycemic control.    Patient medication regimen is as below.     CURRENT DM MEDICATIONS:    Tresiba 20 units daily    Ozempic 0.5 mg weekly   Novolog 5 unit TID wm if BG > 300    Patient has failed the following Diabetes medications:    Metformin      Labs Reviewed.       Lab Results   Component Value Date    CPEPTIDE 3.5 03/20/2017     Lab Results   Component Value Date    GLUTAMICACID 0.00 03/20/2017          //   , There is no height or weight on file to calculate  BMI.  His blood sugar in clinic today is:    Lab Results   Component Value Date    POCGLU 174 (A) 06/26/2020       Review of Systems   Constitutional: Negative for activity change, appetite change, chills and fever.   HENT: Negative for dental problem, mouth sores, nosebleeds, sore throat and trouble swallowing.    Eyes: Negative for pain and discharge.   Respiratory: Negative for shortness of breath, wheezing and stridor.    Cardiovascular: Negative for chest pain, palpitations and leg swelling.   Gastrointestinal: Negative for abdominal pain, diarrhea, nausea and vomiting.   Endocrine: Negative for polydipsia, polyphagia and polyuria.   Genitourinary: Negative for dysuria, frequency and urgency.   Musculoskeletal: Negative for joint swelling and myalgias.   Skin: Negative for rash and wound.   Neurological: Negative for dizziness, syncope, weakness and headaches.   Psychiatric/Behavioral: Negative for behavioral problems and dysphoric mood.         Diabetes Management Status  Statin: Taking  ACE/ARB: Not taking    Screening or Prevention Patient's value Goal Complete/Controlled?   HgA1C Testing and Control   Lab Results   Component Value Date    HGBA1C 7.1 (H) 12/31/2019      Annually/Less than 8% Yes   Lipid profile : 07/21/2019 Annually Yes   LDL control Lab Results   Component Value Date    LDLCALC 59.4 (L) 07/21/2019    Annually/Less than 100 mg/dl  Yes   Nephropathy screening Lab Results   Component Value Date    MICALBCREAT 251.4 (H) 08/01/2016     Lab Results   Component Value Date    PROTEINUA 3+ (A) 06/10/2020    Annually Yes   Blood pressure BP Readings from Last 1 Encounters:   06/26/20 (!) 160/100    Less than 140/90 Yes   Dilated retinal exam : 03/24/2017 Annually No    Foot exam   : 02/21/2020 Annually No     ACTIVITY LEVEL: Sedentary. Discussed activities, benefits, methods, and precautions.  MEAL PLANNING: Patient reports number of meals per day to be 3 and number of snacks per day to be 2.    Patient is encouraged to carb count and consume no more than 30 - 45 grams of carbohydrates in each meal and 15 grams of carbohydrates in each snack.     Social History     Socioeconomic History    Marital status: Single     Spouse name: Not on file    Number of children: Not on file    Years of education: Not on file    Highest education level: Not on file   Occupational History    Occupation: Disabled     Employer: DISABLED   Social Needs    Financial resource strain: Not on file    Food insecurity     Worry: Not on file     Inability: Not on file    Transportation needs     Medical: Not on file     Non-medical: Not on file   Tobacco Use    Smoking status: Former Smoker     Packs/day: 1.00     Years: 40.00     Pack years: 40.00     Quit date: 2013     Years since quittin.4    Smokeless tobacco: Never Used   Substance and Sexual Activity    Alcohol use: Yes     Alcohol/week: 6.0 standard drinks     Types: 6 Cans of beer per week     Comment: seldom    Drug use: No    Sexual activity: Never   Lifestyle    Physical activity     Days per week: Not on file     Minutes per session: Not on file    Stress: Not on file   Relationships    Social connections     Talks on phone: Not on file     Gets together: Not on file     Attends Shinto service: Not on file     Active member of club or organization: Not on file     Attends meetings of clubs or organizations: Not on file     Relationship status: Not on file   Other Topics Concern    Not on file   Social History Narrative    Lives alone. Retired from construction and various jobs, now retired. Would like to return to work PT to alleviate boredom.     Past Medical History:   Diagnosis Date    DINORAH (acute kidney injury) 2016    Arthritis     CAD in native artery 2019    CHF (congestive heart failure)     Chronic obstructive pulmonary disease 2016    Coronary artery disease involving native coronary artery of native heart without  angina pectoris 2016    CRI (chronic renal insufficiency) 2019     donor kidney transplant for DM 16     Induction with Thymo x3 and IV solumedrol to total 875mg  Kidney Biopsy  2016: 16 glomeruli, ACR type 1 AVR type 2, significant microcirculatory changes, c4d negative, No DSA, 5 to10% fibrosis. Treated with thymo x8 2016- no rejection      Diastolic heart failure     Encounter for blood transfusion     ESRD on RRT since 10/2013 10/29/2013    Biopsy proven diabetic nephropathy and lymphoplasmacytic interstitial infiltrate not c/w with AIN (ddx sjogrens or assoc with tamm-horsefall protein extravasation)     GERD (gastroesophageal reflux disease)     History of hepatitis C, s/p successful Rx w/ SVR12 - 2017    Completed 12 weeks harvoni w/ SVR    Hyperlipidemia     Hypertension     PAD (peripheral artery disease) 2019    PIC line (peripherally inserted central catheter) flush     Prophylactic immunotherapy     Proteinuria     PVD (peripheral vascular disease) 2017    RLE BKA CT 16 Extensive atherosclerotic disease of the aorta and mesenteric arteries.     Renal hypertension     Type 2 diabetes mellitus with diabetic neuropathy, with long-term current use of insulin 2016    Vitamin B12 deficiency        Objective:        Physical Exam  Constitutional:       General: He is not in acute distress.     Appearance: He is well-developed. He is not diaphoretic.   HENT:      Head: Normocephalic and atraumatic.      Right Ear: External ear normal.      Left Ear: External ear normal.      Nose: Nose normal.   Eyes:      General:         Right eye: No discharge.         Left eye: No discharge.      Pupils: Pupils are equal, round, and reactive to light.   Neck:      Musculoskeletal: Normal range of motion and neck supple.   Cardiovascular:      Rate and Rhythm: Normal rate and regular rhythm.      Heart sounds: Normal heart sounds.   Pulmonary:       Effort: Pulmonary effort is normal.      Breath sounds: Normal breath sounds.   Abdominal:      Palpations: Abdomen is soft.   Musculoskeletal: Normal range of motion.   Skin:     General: Skin is warm and dry.      Capillary Refill: Capillary refill takes less than 2 seconds.   Neurological:      Mental Status: He is alert and oriented to person, place, and time.      Motor: No abnormal muscle tone.      Coordination: Coordination normal.   Psychiatric:         Behavior: Behavior normal.         Thought Content: Thought content normal.         Judgment: Judgment normal.           Assessment / Plan:     Type 2 diabetes mellitus with hyperglycemia, with long-term current use of insulin  -     POCT Glucose, Hand-Held Device  -     Hemoglobin A1C; Standing  -     insulin aspart U-100 (NOVOLOG) 100 unit/mL (3 mL) InPn pen; Inject 5 units three times a day with meal if BG > 300.  Dispense: 6 mL; Refill: 11  -     Ambulatory referral/consult to Optometry; Future; Expected date: 2020    Diabetic polyneuropathy associated with type 2 diabetes mellitus    Gastroparesis    Peripheral vascular disease    Stage 3 chronic kidney disease     donor kidney transplant for DM 16    CAD in native artery    Essential hypertension    Mixed hyperlipidemia    H/O amputation    Obesity (BMI 30-39.9)      Additional Plan Details:    - POCT Glucose  - Encouraged continuation of lifestyle changes including regular exercise and limiting carbohydrates to 30-45 grams per meal threes times daily and 15 grams per snack with a limit of two daily.   - Encouraged continued monitoring of blood glucose with maintenance of 4 times daily at Fasting, Before Lunch, Before Dinner and Before Bed with Fabrice CGM. Dexcom Paperwork today.   - Current DM Medication Regimen:  Change Tresiba to 18 units daily secondary to overnight hypoglycemia reported by patient. Continue Ozempic 0.5 mg weekly.  Novolog 5 unit TID wm if BG > 300.  - Health  Maintenance standards addressed today: Eye Exam - will be completed within [facility] and scheduled today and A1c to be scheduled  - Nursing Visit: Patient is below goal range for nursing visit for age group and will not need nursing visit at this time .   - Follow up in 3 months with A1c prior.       Bren Ruiz PA-C  [facility]    A total of 30 minutes was spent in face to face time, of which over 50 % was spent in counseling patient on disease process, complications, treatment, and side effects of medications.

## 2020-06-27 LAB — TACROLIMUS BLD-MCNC: 10.1 NG/ML (ref 5–15)

## 2020-06-29 ENCOUNTER — OFFICE VISIT (OUTPATIENT)
Dept: RADIATION ONCOLOGY | Facility: CLINIC | Age: 65
End: 2020-06-29
Payer: MEDICARE

## 2020-06-29 ENCOUNTER — OFFICE VISIT (OUTPATIENT)
Dept: DERMATOLOGY | Facility: CLINIC | Age: 65
End: 2020-06-29
Payer: MEDICARE

## 2020-06-29 DIAGNOSIS — Z94.0 KIDNEY TRANSPLANT STATUS: ICD-10-CM

## 2020-06-29 DIAGNOSIS — Z85.828 HISTORY OF NONMELANOMA SKIN CANCER: ICD-10-CM

## 2020-06-29 DIAGNOSIS — D49.2 SKIN NEOPLASM: Primary | ICD-10-CM

## 2020-06-29 DIAGNOSIS — C44.42 SQUAMOUS CELL CARCINOMA, SCALP/NECK: Primary | ICD-10-CM

## 2020-06-29 DIAGNOSIS — L98.491: ICD-10-CM

## 2020-06-29 PROCEDURE — 99213 PR OFFICE/OUTPT VISIT, EST, LEVL III, 20-29 MIN: ICD-10-PCS | Mod: HCNC,S$GLB,, | Performed by: RADIOLOGY

## 2020-06-29 PROCEDURE — 99213 PR OFFICE/OUTPT VISIT, EST, LEVL III, 20-29 MIN: ICD-10-PCS | Mod: 25,HCNC,S$GLB, | Performed by: DERMATOLOGY

## 2020-06-29 PROCEDURE — 1101F PT FALLS ASSESS-DOCD LE1/YR: CPT | Mod: HCNC,CPTII,S$GLB, | Performed by: RADIOLOGY

## 2020-06-29 PROCEDURE — 11102 TANGNTL BX SKIN SINGLE LES: CPT | Mod: HCNC,S$GLB,, | Performed by: DERMATOLOGY

## 2020-06-29 PROCEDURE — 1159F PR MEDICATION LIST DOCUMENTED IN MEDICAL RECORD: ICD-10-PCS | Mod: HCNC,S$GLB,, | Performed by: RADIOLOGY

## 2020-06-29 PROCEDURE — 11102 PR TANGENTIAL BIOPSY, SKIN, SINGLE LESION: ICD-10-PCS | Mod: 59,HCNC,S$GLB, | Performed by: DERMATOLOGY

## 2020-06-29 PROCEDURE — 99213 OFFICE O/P EST LOW 20 MIN: CPT | Mod: HCNC,S$GLB,, | Performed by: RADIOLOGY

## 2020-06-29 PROCEDURE — 1126F AMNT PAIN NOTED NONE PRSNT: CPT | Mod: HCNC,S$GLB,, | Performed by: DERMATOLOGY

## 2020-06-29 PROCEDURE — 99999 PR PBB SHADOW E&M-EST. PATIENT-LVL III: ICD-10-PCS | Mod: PBBFAC,HCNC,, | Performed by: RADIOLOGY

## 2020-06-29 PROCEDURE — 1101F PT FALLS ASSESS-DOCD LE1/YR: CPT | Mod: HCNC,CPTII,S$GLB, | Performed by: DERMATOLOGY

## 2020-06-29 PROCEDURE — 88305 TISSUE EXAM BY PATHOLOGIST: CPT | Mod: 26,HCNC,, | Performed by: PATHOLOGY

## 2020-06-29 PROCEDURE — 1159F PR MEDICATION LIST DOCUMENTED IN MEDICAL RECORD: ICD-10-PCS | Mod: HCNC,S$GLB,, | Performed by: DERMATOLOGY

## 2020-06-29 PROCEDURE — 1101F PR PT FALLS ASSESS DOC 0-1 FALLS W/OUT INJ PAST YR: ICD-10-PCS | Mod: HCNC,CPTII,S$GLB, | Performed by: RADIOLOGY

## 2020-06-29 PROCEDURE — 99213 OFFICE O/P EST LOW 20 MIN: CPT | Mod: 25,HCNC,S$GLB, | Performed by: DERMATOLOGY

## 2020-06-29 PROCEDURE — 1159F MED LIST DOCD IN RCRD: CPT | Mod: HCNC,S$GLB,, | Performed by: DERMATOLOGY

## 2020-06-29 PROCEDURE — 99999 PR PBB SHADOW E&M-EST. PATIENT-LVL IV: ICD-10-PCS | Mod: PBBFAC,HCNC,, | Performed by: DERMATOLOGY

## 2020-06-29 PROCEDURE — 1126F PR PAIN SEVERITY QUANTIFIED, NO PAIN PRESENT: ICD-10-PCS | Mod: HCNC,S$GLB,, | Performed by: DERMATOLOGY

## 2020-06-29 PROCEDURE — 99999 PR PBB SHADOW E&M-EST. PATIENT-LVL IV: CPT | Mod: PBBFAC,HCNC,, | Performed by: DERMATOLOGY

## 2020-06-29 PROCEDURE — 99999 PR PBB SHADOW E&M-EST. PATIENT-LVL III: CPT | Mod: PBBFAC,HCNC,, | Performed by: RADIOLOGY

## 2020-06-29 PROCEDURE — 1159F MED LIST DOCD IN RCRD: CPT | Mod: HCNC,S$GLB,, | Performed by: RADIOLOGY

## 2020-06-29 PROCEDURE — 88305 TISSUE EXAM BY PATHOLOGIST: CPT | Mod: HCNC | Performed by: PATHOLOGY

## 2020-06-29 PROCEDURE — 1101F PR PT FALLS ASSESS DOC 0-1 FALLS W/OUT INJ PAST YR: ICD-10-PCS | Mod: HCNC,CPTII,S$GLB, | Performed by: DERMATOLOGY

## 2020-06-29 PROCEDURE — 88305 TISSUE EXAM BY PATHOLOGIST: ICD-10-PCS | Mod: 26,HCNC,, | Performed by: PATHOLOGY

## 2020-06-29 RX ORDER — MUPIROCIN 20 MG/G
OINTMENT TOPICAL DAILY
Qty: 22 G | Refills: 1 | Status: SHIPPED | OUTPATIENT
Start: 2020-06-29 | End: 2022-01-01

## 2020-06-29 NOTE — PROGRESS NOTES
History of Present Illness: The patient presents with chief complaint of lesion.  Location: right temple and right ear  Duration: months  Signs/Symptoms: crusty    Prior treatments: none

## 2020-06-29 NOTE — PROGRESS NOTES
Subjective:       Patient ID:  Lavelle Ladd is a 64 y.o. male who presents for   Chief Complaint   Patient presents with    Lesion     spot on R ear & R [employer]    Sores     located on R Leg      History of Present Illness: The patient presents with chief complaint of lesion.  Location: right temple and right ear  Duration: months  Signs/Symptoms: crusty    Prior treatments: none    This is a high risk patient with H/O kidney transplant on immunosuppression here to check for the development of new lesions. 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    Today he also c/o sore to right leg at surgical stump from BKA. It has been present for weeks. It occurred after he removed a pain patch in the area. It has been improving, but would like it evaluated.       Review of Systems   Constitutional: Negative for malaise.   Skin: Positive for dry skin.        Objective:    Physical Exam   Constitutional: He appears well-developed and well-nourished. No distress.   Neurological: He is alert and oriented to person, place, and time.   Psychiatric: He has a normal mood and affect.   Skin:   Areas Examined (abnormalities noted in diagram):   Scalp / Hair Palpated and Inspected  Head / Face Inspection Performed  Neck Inspection Performed  Back Inspection Performed  RUE Inspected  LUE Inspection Performed                  Diagram Legend     Erythematous scaling macule/papule c/w actinic keratosis       Vascular papule c/w angioma      Pigmented verrucoid papule/plaque c/w seborrheic keratosis      Yellow umbilicated papule c/w sebaceous hyperplasia      Irregularly shaped tan macule c/w lentigo     1-2 mm smooth white papules consistent with Milia      Movable subcutaneous cyst with punctum c/w epidermal inclusion cyst      Subcutaneous movable cyst c/w pilar cyst      Firm pink to brown papule c/w dermatofibroma      Pedunculated fleshy  papule(s) c/w skin tag(s)      Evenly pigmented macule c/w junctional nevus     Mildly variegated pigmented, slightly irregular-bordered macule c/w mildly atypical nevus      Flesh colored to evenly pigmented papule c/w intradermal nevus       Pink pearly papule/plaque c/w basal cell carcinoma      Erythematous hyperkeratotic cursted plaque c/w SCC      Surgical scar with no sign of skin cancer recurrence      Open and closed comedones      Inflammatory papules and pustules      Verrucoid papule consistent consistent with wart     Erythematous eczematous patches and plaques     Dystrophic onycholytic nail with subungual debris c/w onychomycosis     Umbilicated papule    Erythematous-base heme-crusted tan verrucoid plaque consistent with inflamed seborrheic keratosis     Erythematous Silvery Scaling Plaque c/w Psoriasis     See annotation      Assessment / Plan:      Pathology Orders:     Normal Orders This Visit    Specimen to Pathology, Dermatology     Questions:    Procedure Type: Dermatology and skin neoplasms    Number of Specimens: 2    ------------------------: -------------------------    Spec 1 Procedure: Biopsy    Spec 1 Clinical Impression: R/O SCC    Spec 1 Source: right ear    ------------------------: -------------------------    Spec 2 Procedure: Biopsy    Spec 2 Clinical Impression: R/O SCC    Spec 2 Source: right temple    Clinical Information: see photo        Skin neoplasm, #1. Right ear R/O SCC, #2. Right temple, R/O SCC  -     SHAVE BIOPSY  -     Specimen to Pathology, Dermatology    Shave biopsy procedure note:    Shave biopsy to two lesions performed after verbal consent including risk of infection, scar, recurrence, need for additional treatment of site. Area prepped with alcohol, anesthetized with approximately 1.0cc of 1% lidocaine with epinephrine. Lesional tissue shaved with razor blade. Hemostasis achieved with application of aluminum chloride. No complications. Dressing applied. Wound  care explained.      Ulcer, skin, chronic, limited to breakdown of skin, right leg  - appears to be healing well following trauma  - recommend local wound care with mupirocin, non-stick dressing and gauze until healed, daily cleansing with mild soap  -     mupirocin (BACTROBAN) 2 % ointment; Apply topically once daily. With dressing changes  Dispense: 22 g; Refill: 1      History of nonmelanoma skin cancer  - SCC, left temple s/p excision and adjuvant radiation  - graft site well healed, NER  - patient adamantly declined TBSE today, but I recommend that we make an appointment for this in the next couple of months    Kidney transplant status  - at increased risk for skin cancer 2/2 immunosuppression  - recommend routine monitoring               Follow up in about 1 month (around 7/29/2020) to check on ulcer, offer FBSE

## 2020-06-29 NOTE — PROGRESS NOTES
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 8/15/19 shave biopsy returned squamous cell carcinoma.   2. 2019 he had an ST-elevation MI.   3. 19 wide local excision left temple with circumferential advancement flap. Pathology invasive squamous cell carcinoma, keratinizing measuring 6.5 cm, completely excised with negative margins. No lymphovascular or perineural invasion. Moderately differentiated. Tumor was 0.4 mm from deep margin. It was widely excised from peripheral margins.   4. 19 completed 50 Gy in 20 fractions left temple adjuvant radiation     CURRENT STATUS: Lavelle Ladd is a pleasant 64 y.o. male who presents today for follow-up.  He last saw Dr. Rodríguez 19.  He saw Dr. Alarcon in February who noted good wound healing.  Today, he notes pain in his R leg stump and has had some healing issues and prosthetic problems.   His L temple wound has healed very well without any pain, drainage, or erythema. He uses vaseline daily and wears a hat for sun avoidance. Notes new skin lesions on R temple and R ear that he will have examined today.   Denies any new neck masses, dysphagia.    PHYSICAL EXAMINATION:  Constitutional/Vitals: well appearing, no acute distress, ECOG 2 - Ambulates, capable of self care only, There were no vitals taken for this visit.  ENT:  Left temple/scalp well-healed, no drainage, erythema, or purulence  R temple and ear lesions noted in picture below  Lymphatic: no cervical, supraclavicular, facial or parotid adenopathy        IMAGING AND LABORATORY FINDINGS: As per HPI; images, labs and medical records reviewed personally in EPIC.    ASSESSMENT:  Left temple area BRENDA and well-healed, interval development of concerning areas on R temple and R ear    PLAN:  Mr. Ladd's L temple has  healed very well. Encouraged continued use of moisturizer, sunscreen, and sun avoidance.   He has new concerning areas on R temple and R ear that require biopsy and/or excision. He sees Dr. Stevens today. Discussed that he can come back to see me since definitive radiation is an option for the R ear area (after biopsy results) if surgery would result in significant deficit/deformity. Otherwise continue dermatologic follow up.    He may get back in with Dr. Alarcon as well if needed, but states he has many appointments.    Follow up in 6 months; unless intervention on R sided areas required sooner.    I spent approximately 15 minutes reviewing the available records and evaluating the patient, out of which over 50% of the time was spent face to face with the patient in counseling and coordinating this patient's care.    Darell Sandoval M.D.  Radiation Oncology  [facility] [id number] [place] [id number]

## 2020-06-30 ENCOUNTER — OFFICE VISIT (OUTPATIENT)
Dept: NEPHROLOGY | Facility: CLINIC | Age: 65
End: 2020-06-30
Payer: MEDICARE

## 2020-06-30 VITALS
HEART RATE: 66 BPM | SYSTOLIC BLOOD PRESSURE: 144 MMHG | HEIGHT: 71 IN | BODY MASS INDEX: 32.25 KG/M2 | DIASTOLIC BLOOD PRESSURE: 84 MMHG | RESPIRATION RATE: 20 BRPM | WEIGHT: 230.38 LBS

## 2020-06-30 DIAGNOSIS — Z94.0 S/P KIDNEY TRANSPLANT: Primary | ICD-10-CM

## 2020-06-30 DIAGNOSIS — G47.33 OBSTRUCTIVE SLEEP APNEA: ICD-10-CM

## 2020-06-30 PROCEDURE — 99999 PR PBB SHADOW E&M-EST. PATIENT-LVL V: ICD-10-PCS | Mod: PBBFAC,HCNC,, | Performed by: INTERNAL MEDICINE

## 2020-06-30 PROCEDURE — 3008F BODY MASS INDEX DOCD: CPT | Mod: HCNC,CPTII,S$GLB, | Performed by: INTERNAL MEDICINE

## 2020-06-30 PROCEDURE — 99499 RISK ADDL DX/OHS AUDIT: ICD-10-PCS | Mod: HCNC,S$GLB,, | Performed by: INTERNAL MEDICINE

## 2020-06-30 PROCEDURE — 3077F SYST BP >= 140 MM HG: CPT | Mod: HCNC,CPTII,S$GLB, | Performed by: INTERNAL MEDICINE

## 2020-06-30 PROCEDURE — 3079F PR MOST RECENT DIASTOLIC BLOOD PRESSURE 80-89 MM HG: ICD-10-PCS | Mod: HCNC,CPTII,S$GLB, | Performed by: INTERNAL MEDICINE

## 2020-06-30 PROCEDURE — 3077F PR MOST RECENT SYSTOLIC BLOOD PRESSURE >= 140 MM HG: ICD-10-PCS | Mod: HCNC,CPTII,S$GLB, | Performed by: INTERNAL MEDICINE

## 2020-06-30 PROCEDURE — 1126F AMNT PAIN NOTED NONE PRSNT: CPT | Mod: HCNC,S$GLB,, | Performed by: INTERNAL MEDICINE

## 2020-06-30 PROCEDURE — 1101F PR PT FALLS ASSESS DOC 0-1 FALLS W/OUT INJ PAST YR: ICD-10-PCS | Mod: HCNC,CPTII,S$GLB, | Performed by: INTERNAL MEDICINE

## 2020-06-30 PROCEDURE — 99999 PR PBB SHADOW E&M-EST. PATIENT-LVL V: CPT | Mod: PBBFAC,HCNC,, | Performed by: INTERNAL MEDICINE

## 2020-06-30 PROCEDURE — 1159F PR MEDICATION LIST DOCUMENTED IN MEDICAL RECORD: ICD-10-PCS | Mod: HCNC,S$GLB,, | Performed by: INTERNAL MEDICINE

## 2020-06-30 PROCEDURE — 1159F MED LIST DOCD IN RCRD: CPT | Mod: HCNC,S$GLB,, | Performed by: INTERNAL MEDICINE

## 2020-06-30 PROCEDURE — 3079F DIAST BP 80-89 MM HG: CPT | Mod: HCNC,CPTII,S$GLB, | Performed by: INTERNAL MEDICINE

## 2020-06-30 PROCEDURE — 99215 OFFICE O/P EST HI 40 MIN: CPT | Mod: HCNC,S$GLB,, | Performed by: INTERNAL MEDICINE

## 2020-06-30 PROCEDURE — 1101F PT FALLS ASSESS-DOCD LE1/YR: CPT | Mod: HCNC,CPTII,S$GLB, | Performed by: INTERNAL MEDICINE

## 2020-06-30 PROCEDURE — 99499 UNLISTED E&M SERVICE: CPT | Mod: HCNC,S$GLB,, | Performed by: INTERNAL MEDICINE

## 2020-06-30 PROCEDURE — 3008F PR BODY MASS INDEX (BMI) DOCUMENTED: ICD-10-PCS | Mod: HCNC,CPTII,S$GLB, | Performed by: INTERNAL MEDICINE

## 2020-06-30 PROCEDURE — 1126F PR PAIN SEVERITY QUANTIFIED, NO PAIN PRESENT: ICD-10-PCS | Mod: HCNC,S$GLB,, | Performed by: INTERNAL MEDICINE

## 2020-06-30 PROCEDURE — 99215 PR OFFICE/OUTPT VISIT, EST, LEVL V, 40-54 MIN: ICD-10-PCS | Mod: HCNC,S$GLB,, | Performed by: INTERNAL MEDICINE

## 2020-06-30 RX ORDER — MYCOPHENOLATE MOFETIL 250 MG/1
250 CAPSULE ORAL 2 TIMES DAILY
Qty: 60 CAPSULE | Refills: 11 | Status: SHIPPED | OUTPATIENT
Start: 2020-06-30 | End: 2022-01-01 | Stop reason: SDUPTHER

## 2020-06-30 RX ORDER — TACROLIMUS 0.5 MG/1
1 CAPSULE ORAL EVERY 12 HOURS
Qty: 180 CAPSULE | Refills: 11 | Status: SHIPPED | OUTPATIENT
Start: 2020-06-30

## 2020-06-30 NOTE — PROGRESS NOTES
Subjective:       Patient ID: Lavelle Ladd is a 64 y.o. White male who presents for follow-up evaluation of Chronic Kidney Disease and Kidney Transplant    Hypertension  Pertinent negatives include no chest pain, headaches, neck pain, palpitations or shortness of breath.      Patient is a 64-year-old white male with ESRD from diabetic nephropathy biopsy-proven status post hemodialysis since 2013.  Patient underwent cadaveric kidney transplant in May 2016 and had received induction with Thymo x3 and IV solumedrol to total 875mg. his postoperative creatinine came down to 2.4.  In the last part of May 2016 creatinine went up to 3.0 again.  Patient underwent transplant kidney biopsy on 31st of May.    Kidney Biopsy   5/31/2016: 16 glomeruli, ACR type 1 AVR type 2, significant microcirculatory changes, c4d negative, No DSA, 5 to10% fibrosis. Treated with thymo x 8  6/21/2016- no rejection  8/15/2016 repeat  kidney transplant biopsy:  Micro-circulation inflammation +C4d Acute and chronic Antibody mediated rejection + interstitial infiltrates suspicious for ACR ; + CYA toxicity evidence     In July the best creatinine had come down to 1.4.  Most recent creatinine has gone up to 1.7.  He has had elevated levels of Prograf which have been adjusted by transplant nephrologist in [place].  He had a  hospitalization with hypertension uncontrolled, fluid overload and severe hyperglycemia.  Patient is currently being managed on Prograf, CellCept and steroids.  He was discharged home on NPH insulin 14 units 3 times a day and regular insulin 4 units with each meal 3 times a day.  He has had occasional hypoglycemia.  Mostly his sugars have been running between 250-350.    7/20/16 comes to the clinic to reestablish himself off to the kidney transplant.  All records reviewed in detail; insulin increased     August 2016 treatment for his hepatitis C genotype 1B evaluated by hepatology -records reviewed ( Jennifer Scheuermann, PA  "); had a liver biopsy     8/15/2016 repeat  kidney transplant biopsy:  Micro-circulation inflammation +C4d Acute and chronic Antibody mediated rejection + interstitial infiltrates suspicious for ACR ; + CYA toxicity evidence     Prograf 1.5 BID ; cell cept being held and restarted now ; lower frequency of Bactrim ; c/o very low energy     8/2016 admitted for IV solumederol boluses 5mg /kg daily x 3 days ; also treated with neupogen for leucopenia now back for fup and DINORAH is better and creatinine down to 1.5 ; prograf is 3.4 ( was 11 on 10/31/16 )     6/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  Negative for activity change, appetite change, chills, diaphoresis, fatigue and fever.   HENT: Negative.  Negative for congestion and trouble swallowing.    Eyes: Negative.    Respiratory: Negative.  Negative for cough, chest tightness, shortness of breath and wheezing.    Cardiovascular: Negative.  Negative for chest pain, palpitations and leg swelling.   Gastrointestinal: Negative.  Negative for abdominal distention, abdominal pain, nausea and vomiting.   Genitourinary: Negative.  Negative for decreased urine volume, difficulty urinating, dysuria, enuresis, flank pain, frequency, hematuria, penile swelling, scrotal swelling and urgency.   Musculoskeletal: Negative.  Negative for arthralgias, back pain, joint swelling and neck pain.   Skin: Negative for rash.   Neurological: Negative.  Negative for tremors, seizures and headaches.   Psychiatric/Behavioral: Negative.  Negative for confusion and sleep disturbance. The patient is not nervous/anxious.        Objective:     BP (!) 144/84   Pulse 66   Resp 20   Ht 5' 11" (1.803 m)   Wt 104.5 kg (230 lb 6.1 oz)   BMI 32.13 kg/m²      Physical Exam  Constitutional:       General: He is not in acute distress.     Appearance: He is well-developed.   HENT:      Head: " Normocephalic.   Eyes:      Pupils: Pupils are equal, round, and reactive to light.   Neck:      Musculoskeletal: Normal range of motion and neck supple.      Thyroid: No thyromegaly.      Vascular: No JVD.   Cardiovascular:      Rate and Rhythm: Normal rate and regular rhythm.      Chest Wall: PMI is not displaced.      Heart sounds: Normal heart sounds, S1 normal and S2 normal. No murmur. No friction rub. No gallop.    Pulmonary:      Effort: Pulmonary effort is normal. No respiratory distress.      Breath sounds: Normal breath sounds. No wheezing or rales.   Chest:      Chest wall: No tenderness.   Abdominal:      General: There is no distension.      Palpations: There is no mass.      Tenderness: There is no abdominal tenderness. There is no rebound.      Hernia: No hernia is present.   Musculoskeletal: Normal range of motion.         General: No tenderness.      Comments: Right below-knee amputation  Left upper arm fistula has clotted   Lymphadenopathy:      Cervical: No cervical adenopathy.   Skin:     General: Skin is warm and dry.      Findings: No erythema or rash.      Comments: Wound right temple for recurrent SCCA    Neurological:      Mental Status: He is alert and oriented to person, place, and time. He is not disoriented.      Cranial Nerves: No cranial nerve deficit.      Motor: No abnormal muscle tone.      Coordination: Coordination abnormal.      Deep Tendon Reflexes: Reflexes abnormal.   Psychiatric:         Behavior: Behavior normal.         Thought Content: Thought content normal.         Judgment: Judgment normal.       in motorized WC     Lab Results   Component Value Date    CREATININE 1.8 (H) 06/26/2020    BUN 17 06/26/2020     06/26/2020    K 3.7 06/26/2020    CL 98 06/26/2020    CO2 24 06/26/2020     Lab Results   Component Value Date    WBC 5.55 06/26/2020    HGB 15.5 06/26/2020    HCT 49.1 06/26/2020    MCV 91 06/26/2020     06/26/2020     Lab Results   Component Value Date     .0 (H) 2020    CALCIUM 9.1 2020    CAION 1.10 2016    PHOS 3.3 2020     Prograf level is 3.4  vitamin D level is 27 CMV serologies reviewed ; C-peptide 5.7; hemoglobin A1c was 7.4 as of 2016 and 11.4 in 2016    Cystatin C shows GFR 25 % when creatinine was 1.9     Assessment:    )    1.  donor kidney transplant for DM 16    2. Obstructive sleep apnea        Plan:         1.   Cadaveric kidney transplant status post acute rejection: Status post induction with Thymoglobulin and treatment of rejection with, globulin and steroids as well.   Patient had a repeat kidney biopsy: Micro-circulation inflammation +C4d Acute and chronic Antibody mediated rejection + interstitial infiltrates suspicious for ACR ; + CYA toxicity evidence. S/p IV SM boluses with successful resolution.  I have reviewed the transplant clinic note from .  His Prograf level was 10 : his  Prograf dosing  is now taking 1.5 mg twice daily    Will decrease prograf to 1 mg twice daily and recheck level           2.  Hyperparathyroidism  : check vitamin D level on follow-up once stable may consider PTH surgery       3.  Hypertension much better controlled      -We discussed his immunosuppression is likely contributing to the rapid growth of his skin cancer.  His CellCept was lowered today.  -Repeat Immuknow cylex in 1 month to see if further reduction of immunosuppression is feasible without jeopardizing the allograft.          fup in 1 months         Avel Estrada MD

## 2020-07-06 ENCOUNTER — HOSPITAL ENCOUNTER (EMERGENCY)
Facility: HOSPITAL | Age: 65
Discharge: HOME | End: 2020-07-06
Attending: EMERGENCY MEDICINE
Payer: MEDICARE

## 2020-07-06 VITALS
HEIGHT: 71 IN | HEART RATE: 79 BPM | RESPIRATION RATE: 20 BRPM | DIASTOLIC BLOOD PRESSURE: 85 MMHG | TEMPERATURE: 98 F | BODY MASS INDEX: 33.37 KG/M2 | SYSTOLIC BLOOD PRESSURE: 187 MMHG | WEIGHT: 238.38 LBS | OXYGEN SATURATION: 97 %

## 2020-07-06 DIAGNOSIS — J18.9 PNEUMONIA OF LEFT LOWER LOBE DUE TO INFECTIOUS ORGANISM: ICD-10-CM

## 2020-07-06 DIAGNOSIS — E87.6 HYPOKALEMIA: Primary | ICD-10-CM

## 2020-07-06 DIAGNOSIS — R07.9 CHEST PAIN: ICD-10-CM

## 2020-07-06 LAB
ALBUMIN SERPL BCP-MCNC: 3.6 G/DL (ref 3.5–5.2)
ALP SERPL-CCNC: 88 U/L (ref 55–135)
ALT SERPL W/O P-5'-P-CCNC: 40 U/L (ref 10–44)
ANION GAP SERPL CALC-SCNC: 14 MMOL/L (ref 8–16)
AST SERPL-CCNC: 31 U/L (ref 10–40)
BASOPHILS # BLD AUTO: 0.03 K/UL (ref 0–0.2)
BASOPHILS NFR BLD: 0.6 % (ref 0–1.9)
BILIRUB SERPL-MCNC: 0.6 MG/DL (ref 0.1–1)
BNP SERPL-MCNC: 91 PG/ML (ref 0–99)
BUN SERPL-MCNC: 17 MG/DL (ref 8–23)
CALCIUM SERPL-MCNC: 9.1 MG/DL (ref 8.7–10.5)
CHLORIDE SERPL-SCNC: 97 MMOL/L (ref 95–110)
CO2 SERPL-SCNC: 24 MMOL/L (ref 23–29)
CREAT SERPL-MCNC: 1.6 MG/DL (ref 0.5–1.4)
DIFFERENTIAL METHOD: NORMAL
EOSINOPHIL # BLD AUTO: 0 K/UL (ref 0–0.5)
EOSINOPHIL NFR BLD: 0.6 % (ref 0–8)
ERYTHROCYTE [DISTWIDTH] IN BLOOD BY AUTOMATED COUNT: 14.1 % (ref 11.5–14.5)
EST. GFR  (AFRICAN AMERICAN): 51 ML/MIN/1.73 M^2
EST. GFR  (NON AFRICAN AMERICAN): 44 ML/MIN/1.73 M^2
GLUCOSE SERPL-MCNC: 135 MG/DL (ref 70–110)
HCT VFR BLD AUTO: 49.4 % (ref 40–54)
HGB BLD-MCNC: 16.1 G/DL (ref 14–18)
IMM GRANULOCYTES # BLD AUTO: 0.02 K/UL (ref 0–0.04)
IMM GRANULOCYTES NFR BLD AUTO: 0.4 % (ref 0–0.5)
LYMPHOCYTES # BLD AUTO: 1.7 K/UL (ref 1–4.8)
LYMPHOCYTES NFR BLD: 31.8 % (ref 18–48)
MCH RBC QN AUTO: 28.7 PG (ref 27–31)
MCHC RBC AUTO-ENTMCNC: 32.6 G/DL (ref 32–36)
MCV RBC AUTO: 88 FL (ref 82–98)
MONOCYTES # BLD AUTO: 0.6 K/UL (ref 0.3–1)
MONOCYTES NFR BLD: 11.3 % (ref 4–15)
NEUTROPHILS # BLD AUTO: 2.9 K/UL (ref 1.8–7.7)
NEUTROPHILS NFR BLD: 55.3 % (ref 38–73)
NRBC BLD-RTO: 0 /100 WBC
PLATELET # BLD AUTO: 203 K/UL (ref 150–350)
PMV BLD AUTO: 9.8 FL (ref 9.2–12.9)
POTASSIUM SERPL-SCNC: 3 MMOL/L (ref 3.5–5.1)
PROT SERPL-MCNC: 7.4 G/DL (ref 6–8.4)
RBC # BLD AUTO: 5.61 M/UL (ref 4.6–6.2)
SARS-COV-2 RDRP RESP QL NAA+PROBE: NEGATIVE
SODIUM SERPL-SCNC: 135 MMOL/L (ref 136–145)
TROPONIN I SERPL DL<=0.01 NG/ML-MCNC: 0.02 NG/ML (ref 0–0.03)
WBC # BLD AUTO: 5.29 K/UL (ref 3.9–12.7)

## 2020-07-06 PROCEDURE — 85025 COMPLETE CBC W/AUTO DIFF WBC: CPT | Mod: HCNC

## 2020-07-06 PROCEDURE — 93005 ELECTROCARDIOGRAM TRACING: CPT | Mod: HCNC

## 2020-07-06 PROCEDURE — 80053 COMPREHEN METABOLIC PANEL: CPT | Mod: HCNC

## 2020-07-06 PROCEDURE — 36415 COLL VENOUS BLD VENIPUNCTURE: CPT | Mod: HCNC

## 2020-07-06 PROCEDURE — U0002 COVID-19 LAB TEST NON-CDC: HCPCS | Mod: HCNC

## 2020-07-06 PROCEDURE — 93010 ELECTROCARDIOGRAM REPORT: CPT | Mod: HCNC,,, | Performed by: INTERNAL MEDICINE

## 2020-07-06 PROCEDURE — 83880 ASSAY OF NATRIURETIC PEPTIDE: CPT | Mod: HCNC

## 2020-07-06 PROCEDURE — 99285 EMERGENCY DEPT VISIT HI MDM: CPT | Mod: 25,HCNC,CS

## 2020-07-06 PROCEDURE — 84484 ASSAY OF TROPONIN QUANT: CPT | Mod: HCNC

## 2020-07-06 PROCEDURE — 93010 EKG 12-LEAD: ICD-10-PCS | Mod: HCNC,,, | Performed by: INTERNAL MEDICINE

## 2020-07-06 PROCEDURE — 25000003 PHARM REV CODE 250: Mod: HCNC | Performed by: EMERGENCY MEDICINE

## 2020-07-06 RX ORDER — ASPIRIN 325 MG
325 TABLET ORAL
Status: DISCONTINUED | OUTPATIENT
Start: 2020-07-06 | End: 2020-07-07 | Stop reason: HOSPADM

## 2020-07-06 RX ORDER — POTASSIUM CHLORIDE 20 MEQ/1
40 TABLET, EXTENDED RELEASE ORAL
Status: COMPLETED | OUTPATIENT
Start: 2020-07-06 | End: 2020-07-06

## 2020-07-06 RX ORDER — CLONIDINE HYDROCHLORIDE 0.1 MG/1
0.1 TABLET ORAL 3 TIMES DAILY
Qty: 30 TABLET | Refills: 0 | Status: ON HOLD | OUTPATIENT
Start: 2020-07-06 | End: 2022-01-01 | Stop reason: HOSPADM

## 2020-07-06 RX ORDER — CLONIDINE HYDROCHLORIDE 0.2 MG/1
0.2 TABLET ORAL
Status: COMPLETED | OUTPATIENT
Start: 2020-07-06 | End: 2020-07-06

## 2020-07-06 RX ORDER — LEVOFLOXACIN 750 MG/1
750 TABLET ORAL DAILY
Qty: 5 TABLET | Refills: 0 | Status: SHIPPED | OUTPATIENT
Start: 2020-07-06 | End: 2020-07-11

## 2020-07-06 RX ADMIN — POTASSIUM CHLORIDE 40 MEQ: 1500 TABLET, EXTENDED RELEASE ORAL at 10:07

## 2020-07-06 RX ADMIN — CLONIDINE HYDROCHLORIDE 0.2 MG: 0.2 TABLET ORAL at 10:07

## 2020-07-07 ENCOUNTER — TELEPHONE (OUTPATIENT)
Dept: INTERNAL MEDICINE | Facility: CLINIC | Age: 65
End: 2020-07-07

## 2020-07-07 ENCOUNTER — HOSPITAL ENCOUNTER (EMERGENCY)
Facility: HOSPITAL | Age: 65
Discharge: HOME | End: 2020-07-07
Attending: EMERGENCY MEDICINE
Payer: MEDICARE

## 2020-07-07 VITALS
BODY MASS INDEX: 32.2 KG/M2 | TEMPERATURE: 98 F | HEART RATE: 90 BPM | SYSTOLIC BLOOD PRESSURE: 140 MMHG | RESPIRATION RATE: 19 BRPM | WEIGHT: 230 LBS | HEIGHT: 71 IN | OXYGEN SATURATION: 97 % | DIASTOLIC BLOOD PRESSURE: 84 MMHG

## 2020-07-07 DIAGNOSIS — R55 NEAR SYNCOPE: ICD-10-CM

## 2020-07-07 DIAGNOSIS — J18.9 PNEUMONIA OF LEFT LOWER LOBE DUE TO INFECTIOUS ORGANISM: Primary | ICD-10-CM

## 2020-07-07 LAB
ALBUMIN SERPL BCP-MCNC: 3.3 G/DL (ref 3.5–5.2)
ALP SERPL-CCNC: 84 U/L (ref 55–135)
ALT SERPL W/O P-5'-P-CCNC: 35 U/L (ref 10–44)
ANION GAP SERPL CALC-SCNC: 12 MMOL/L (ref 8–16)
AST SERPL-CCNC: 23 U/L (ref 10–40)
BASOPHILS # BLD AUTO: 0.02 K/UL (ref 0–0.2)
BASOPHILS NFR BLD: 0.4 % (ref 0–1.9)
BILIRUB SERPL-MCNC: 0.8 MG/DL (ref 0.1–1)
BNP SERPL-MCNC: 80 PG/ML (ref 0–99)
BUN SERPL-MCNC: 21 MG/DL (ref 8–23)
CALCIUM SERPL-MCNC: 9.5 MG/DL (ref 8.7–10.5)
CHLORIDE SERPL-SCNC: 90 MMOL/L (ref 95–110)
CO2 SERPL-SCNC: 31 MMOL/L (ref 23–29)
CREAT SERPL-MCNC: 1.9 MG/DL (ref 0.5–1.4)
DIFFERENTIAL METHOD: ABNORMAL
EOSINOPHIL # BLD AUTO: 0.1 K/UL (ref 0–0.5)
EOSINOPHIL NFR BLD: 1.1 % (ref 0–8)
ERYTHROCYTE [DISTWIDTH] IN BLOOD BY AUTOMATED COUNT: 14.3 % (ref 11.5–14.5)
EST. GFR  (AFRICAN AMERICAN): 42 ML/MIN/1.73 M^2
EST. GFR  (NON AFRICAN AMERICAN): 36 ML/MIN/1.73 M^2
GLUCOSE SERPL-MCNC: 194 MG/DL (ref 70–110)
HCT VFR BLD AUTO: 46.5 % (ref 40–54)
HGB BLD-MCNC: 14.8 G/DL (ref 14–18)
HIV 1+2 AB+HIV1 P24 AG SERPL QL IA: NEGATIVE
IMM GRANULOCYTES # BLD AUTO: 0.03 K/UL (ref 0–0.04)
IMM GRANULOCYTES NFR BLD AUTO: 0.5 % (ref 0–0.5)
INR PPP: 1 (ref 0.8–1.2)
LACTATE SERPL-SCNC: 1.4 MMOL/L (ref 0.5–2.2)
LIPASE SERPL-CCNC: 29 U/L (ref 4–60)
LYMPHOCYTES # BLD AUTO: 1.3 K/UL (ref 1–4.8)
LYMPHOCYTES NFR BLD: 22.1 % (ref 18–48)
MAGNESIUM SERPL-MCNC: 1.8 MG/DL (ref 1.6–2.6)
MCH RBC QN AUTO: 28.4 PG (ref 27–31)
MCHC RBC AUTO-ENTMCNC: 31.8 G/DL (ref 32–36)
MCV RBC AUTO: 89 FL (ref 82–98)
MONOCYTES # BLD AUTO: 0.5 K/UL (ref 0.3–1)
MONOCYTES NFR BLD: 9.5 % (ref 4–15)
NEUTROPHILS # BLD AUTO: 3.8 K/UL (ref 1.8–7.7)
NEUTROPHILS NFR BLD: 66.4 % (ref 38–73)
NRBC BLD-RTO: 0 /100 WBC
PHOSPHATE SERPL-MCNC: 3.2 MG/DL (ref 2.7–4.5)
PLATELET # BLD AUTO: 185 K/UL (ref 150–350)
PMV BLD AUTO: 10.1 FL (ref 9.2–12.9)
POTASSIUM SERPL-SCNC: 3.6 MMOL/L (ref 3.5–5.1)
PROCALCITONIN SERPL IA-MCNC: 0.03 NG/ML
PROT SERPL-MCNC: 6.8 G/DL (ref 6–8.4)
PROTHROMBIN TIME: 10.8 SEC (ref 9–12.5)
RBC # BLD AUTO: 5.21 M/UL (ref 4.6–6.2)
SARS-COV-2 RDRP RESP QL NAA+PROBE: NEGATIVE
SODIUM SERPL-SCNC: 133 MMOL/L (ref 136–145)
TROPONIN I SERPL DL<=0.01 NG/ML-MCNC: 0.02 NG/ML (ref 0–0.03)
WBC # BLD AUTO: 5.69 K/UL (ref 3.9–12.7)

## 2020-07-07 PROCEDURE — 93010 EKG 12-LEAD: ICD-10-PCS | Mod: HCNC,,, | Performed by: INTERNAL MEDICINE

## 2020-07-07 PROCEDURE — 85610 PROTHROMBIN TIME: CPT | Mod: HCNC

## 2020-07-07 PROCEDURE — 84100 ASSAY OF PHOSPHORUS: CPT | Mod: HCNC

## 2020-07-07 PROCEDURE — 93010 ELECTROCARDIOGRAM REPORT: CPT | Mod: HCNC,,, | Performed by: INTERNAL MEDICINE

## 2020-07-07 PROCEDURE — 84145 PROCALCITONIN (PCT): CPT | Mod: HCNC

## 2020-07-07 PROCEDURE — 63600175 PHARM REV CODE 636 W HCPCS: Mod: HCNC | Performed by: EMERGENCY MEDICINE

## 2020-07-07 PROCEDURE — 83690 ASSAY OF LIPASE: CPT | Mod: HCNC

## 2020-07-07 PROCEDURE — 96374 THER/PROPH/DIAG INJ IV PUSH: CPT | Mod: HCNC

## 2020-07-07 PROCEDURE — 25000003 PHARM REV CODE 250: Mod: HCNC | Performed by: EMERGENCY MEDICINE

## 2020-07-07 PROCEDURE — 84484 ASSAY OF TROPONIN QUANT: CPT | Mod: HCNC

## 2020-07-07 PROCEDURE — 83880 ASSAY OF NATRIURETIC PEPTIDE: CPT | Mod: HCNC

## 2020-07-07 PROCEDURE — 80053 COMPREHEN METABOLIC PANEL: CPT | Mod: HCNC

## 2020-07-07 PROCEDURE — 83605 ASSAY OF LACTIC ACID: CPT | Mod: HCNC

## 2020-07-07 PROCEDURE — 86703 HIV-1/HIV-2 1 RESULT ANTBDY: CPT | Mod: HCNC

## 2020-07-07 PROCEDURE — 93005 ELECTROCARDIOGRAM TRACING: CPT | Mod: HCNC

## 2020-07-07 PROCEDURE — 83735 ASSAY OF MAGNESIUM: CPT | Mod: HCNC

## 2020-07-07 PROCEDURE — 99285 EMERGENCY DEPT VISIT HI MDM: CPT | Mod: 25,HCNC,CS

## 2020-07-07 PROCEDURE — 96361 HYDRATE IV INFUSION ADD-ON: CPT | Mod: HCNC

## 2020-07-07 PROCEDURE — U0002 COVID-19 LAB TEST NON-CDC: HCPCS | Mod: HCNC

## 2020-07-07 PROCEDURE — 87040 BLOOD CULTURE FOR BACTERIA: CPT | Mod: HCNC

## 2020-07-07 PROCEDURE — 85025 COMPLETE CBC W/AUTO DIFF WBC: CPT | Mod: HCNC

## 2020-07-07 RX ORDER — ONDANSETRON 2 MG/ML
4 INJECTION INTRAMUSCULAR; INTRAVENOUS
Status: DISCONTINUED | OUTPATIENT
Start: 2020-07-07 | End: 2020-07-07

## 2020-07-07 RX ORDER — ONDANSETRON 2 MG/ML
8 INJECTION INTRAMUSCULAR; INTRAVENOUS
Status: COMPLETED | OUTPATIENT
Start: 2020-07-07 | End: 2020-07-07

## 2020-07-07 RX ADMIN — SODIUM CHLORIDE 1000 ML: 0.9 INJECTION, SOLUTION INTRAVENOUS at 11:07

## 2020-07-07 RX ADMIN — ONDANSETRON 8 MG: 2 INJECTION INTRAMUSCULAR; INTRAVENOUS at 11:07

## 2020-07-07 NOTE — ED PROVIDER NOTES
SCRIBE #1 NOTE: I, Lavelle Kamara, am scribing for, and in the presence of, Marshall Scott Jr., MD. I have scribed the entire note.       History     Chief Complaint   Patient presents with    Loss of Consciousness     + N&V, body aches pt reports seen and d/c yesterday for dx w/ PNA. neg COVID test yesterday.      Review of patient's allergies indicates:  No Known Allergies      History of Present Illness     HPI    2020, 10:36 AM  History obtained from the patient      History of Present Illness: Lavelle Ladd is a 64 y.o. male patient with a PMHx of DINORAH, CAD, CHF, GERD, HTN, DM, who presents to the Emergency Department for evaluation of near syncope which onset suddenly just PTA. Pt reports that he was seen at this ED last night for respiratory sxs, and was dx with PNA at that time, and prescribed abx, of which he has taken 1 dose. Symptoms are episodic and moderate in severity. No mitigating or exacerbating factors reported. Associated sxs include nausea. Patient denies any fever, chills, sore throat, cough, v/d, CP, SOB, HA, syncope, weakness, and all other sxs at this time. No further complaints or concerns at this time.       Arrival mode: Personal vehicle      PCP: Yahir Calzada MD        Past Medical History:  Past Medical History:   Diagnosis Date    DINORAH (acute kidney injury) 2016    Arthritis     CAD in native artery 2019    CHF (congestive heart failure)     Chronic obstructive pulmonary disease 2016    Coronary artery disease involving native coronary artery of native heart without angina pectoris 2016    CRI (chronic renal insufficiency) 2019     donor kidney transplant for DM 16     Induction with Thymo x3 and IV solumedrol to total 875mg  Kidney Biopsy  2016: 16 glomeruli, ACR type 1 AVR type 2, significant microcirculatory changes, c4d negative, No DSA, 5 to10% fibrosis. Treated with thymo x8 2016- no rejection      Diastolic heart  failure     Encounter for blood transfusion     ESRD on RRT since 10/2013 10/29/2013    Biopsy proven diabetic nephropathy and lymphoplasmacytic interstitial infiltrate not c/w with AIN (ddx sjogrens or assoc with tamm-horsefall protein extravasation)     GERD (gastroesophageal reflux disease)     History of hepatitis C, s/p successful Rx w/ SVR12 - 4/2017 4/5/2017    Completed 12 weeks harvoni w/ SVR    Hyperlipidemia     Hypertension     PAD (peripheral artery disease) 7/21/2019    PIC line (peripherally inserted central catheter) flush     Prophylactic immunotherapy     Proteinuria     PVD (peripheral vascular disease) 6/26/2017    RLE BKA CT 12/11/16 Extensive atherosclerotic disease of the aorta and mesenteric arteries.     Renal hypertension     Type 2 diabetes mellitus with diabetic neuropathy, with long-term current use of insulin 12/1/2016    Vitamin B12 deficiency        Past Surgical History:  Past Surgical History:   Procedure Laterality Date    AORTOGRAPHY WITH SERIALOGRAPHY N/A 6/14/2018    Procedure: LEFT LEG ANGIOGRAM;  Surgeon: Donal Mcdonald MD;  Location: [facility] CATH LAB;  Service: Vascular;  Laterality: N/A;    av bovine graft      Left UE    AV FISTULA PLACEMENT      left UE    CARDIAC CATHETERIZATION  02/2015    CLOSURE OF WOUND Left 9/24/2018    Procedure: CLOSURE, WOUND;  Surgeon: Karla Wheeler DPM;  Location: [facility] OR;  Service: Podiatry;  Laterality: Left;  Secondary Wound closure, extensive    CLOSURE OF WOUND Left 11/5/2018    Procedure: CLOSURE, WOUND;  Surgeon: Karla Wheeler DPM;  Location: [facility] OR;  Service: Podiatry;  Laterality: Left;    DEBRIDEMENT OF MULTIPLE METATARSAL BONES Left 11/5/2018    Procedure: DEBRIDEMENT, METATARSAL BONE, 2 OR MORE BONES;  Surgeon: Karla Wheeler DPM;  Location: [facility] OR;  Service: Podiatry;  Laterality: Left;    EXCISION OF SKIN Left 9/27/2019    Procedure: EXCISION, SKIN;  Surgeon: Lenard Alarcon MD;  Location: [facility]  OR 2ND FLR;  Service: Plastics;  Laterality: Left;  Plastics set, NIMS monitor, [employer]Cell    FOOT AMPUTATION THROUGH METATARSAL Left 9/21/2018    Procedure: AMPUTATION, FOOT, TRANSMETATARSAL;  Surgeon: Karla Wheeler DPM;  Location: [facility] OR;  Service: Podiatry;  Laterality: Left;    FOOT AMPUTATION THROUGH METATARSAL Left 10/31/2018    Procedure: AMPUTATION, FOOT, TRANSMETATARSAL;  Surgeon: Karla Wheeler DPM;  Location: [facility] OR;  Service: Podiatry;  Laterality: Left;    FOOT AMPUTATION THROUGH METATARSAL Left 11/5/2018    Procedure: AMPUTATION, FOOT, TRANSMETATARSAL;  Surgeon: Karla Wheeler DPM;  Location: [facility] OR;  Service: Podiatry;  Laterality: Left;  revisional transmetatarsal amputation, Left foot    IRRIGATION AND DEBRIDEMENT OF LOWER EXTREMITY Left 10/31/2018    Procedure: IRRIGATION AND DEBRIDEMENT, LOWER EXTREMITY;  Surgeon: Karla Wheeler DPM;  Location: [facility] OR;  Service: Podiatry;  Laterality: Left;    KIDNEY TRANSPLANT  05/21/2016    LEFT HEART CATHETERIZATION Left 7/21/2019    Procedure: CATHETERIZATION, HEART, LEFT;  Surgeon: Andrew Valdez MD;  Location: [facility] CATH LAB;  Service: Cardiology;  Laterality: Left;    LEG AMPUTATION THROUGH KNEE  2011    right LE, started as nail puncture leading to diabetic ulcer    SKIN FULL THICKNESS GRAFT Left 10/7/2019    Procedure: APPLICATION, GRAFT, SKIN, FULL-THICKNESS;  Surgeon: Lenard Alarcon MD;  Location: [facility] OR 2ND FLR;  Service: Plastics;  Laterality: Left;    SURGICAL REMOVAL OF LESION OF FACE Right 10/7/2019    Procedure: EXCISION, LESION, FACE;  Surgeon: Lenard Alarcon MD;  Location: [facility] OR 2ND FLR;  Service: Plastics;  Laterality: Right;         Family History:  Family History   Problem Relation Age of Onset    Cancer Father     Diabetes Father     Heart failure Father     Stroke Father     Heart failure Mother     Kidney disease Neg Hx        Social History:  Social History     Tobacco Use    Smoking status: Former  Smoker     Packs/day: 1.00     Years: 40.00     Pack years: 40.00     Quit date: 2013     Years since quittin.4    Smokeless tobacco: Never Used   Substance and Sexual Activity    Alcohol use: Yes     Alcohol/week: 6.0 standard drinks     Types: 6 Cans of beer per week     Comment: seldom    Drug use: No    Sexual activity: Never        Review of Systems     Review of Systems   Constitutional: Negative for chills, diaphoresis and fever.   HENT: Negative for congestion, rhinorrhea and sore throat.    Respiratory: Negative for cough and shortness of breath.    Cardiovascular: Negative for chest pain.   Gastrointestinal: Positive for nausea. Negative for abdominal pain, diarrhea and vomiting.   Genitourinary: Negative for dysuria.   Musculoskeletal: Negative for back pain.   Skin: Negative for rash.   Neurological: Negative for dizziness, seizures, syncope, weakness, light-headedness, numbness and headaches.        (+) near syncopal episode   Hematological: Does not bruise/bleed easily.   All other systems reviewed and are negative.       Physical Exam     Initial Vitals [20 1031]   BP Pulse Resp Temp SpO2   (!) 157/90 88 20 97.6 °F (36.4 °C) 97 %      MAP       --          Physical Exam  Nursing Notes and Vital Signs Reviewed.  Constitutional: Patient is in no acute distress. Well-developed and well-nourished. Glucose monitor present on L arm. No overt signs of infection.  Head: Atraumatic. Normocephalic.  Eyes: PERRL. EOM intact. Conjunctivae are not pale. No scleral icterus.  ENT: Mucous membranes are moist. Oropharynx is clear and symmetric.    Neck: Supple. Full ROM. No lymphadenopathy.  Cardiovascular: Regular rate. Regular rhythm. No murmurs, rubs, or gallops. Distal pulses are 2+ and symmetric.  Pulmonary/Chest: No respiratory distress. Bilat basal crackles, worse on L side.  Abdominal: Distended but not tense.  There is no tenderness.  No rebound, guarding, or rigidity. Good bowel  "sounds.  Genitourinary: No CVA tenderness  Musculoskeletal: Moves all extremities. No obvious deformities. No edema. No calf tenderness.  Skin: Warm and dry.  Neurological:  Alert, awake, and appropriate.  Normal speech.  No acute focal neurological deficits are appreciated. GCS 15.  Psychiatric: Normal affect. Good eye contact. Appropriate in content.     ED Course   Procedures  ED Vital Signs:  Vitals:    07/07/20 1031 07/07/20 1137 07/07/20 1300 07/07/20 1400   BP: (!) 157/90  134/82 (!) 140/84   Pulse: 88 (S) 90     Resp: 20  19    Temp: 97.6 °F (36.4 °C)  98.2 °F (36.8 °C)    TempSrc: Oral      SpO2: 97%      Weight: 104.3 kg (230 lb)      Height: 5' 11" (1.803 m)          Abnormal Lab Results:  Labs Reviewed   CBC W/ AUTO DIFFERENTIAL - Abnormal; Notable for the following components:       Result Value    Mean Corpuscular Hemoglobin Conc 31.8 (*)     All other components within normal limits   COMPREHENSIVE METABOLIC PANEL - Abnormal; Notable for the following components:    Sodium 133 (*)     Chloride 90 (*)     CO2 31 (*)     Glucose 194 (*)     Creatinine 1.9 (*)     Albumin 3.3 (*)     eGFR if  42 (*)     eGFR if non  36 (*)     All other components within normal limits   CULTURE, BLOOD   HIV 1 / 2 ANTIBODY   LACTIC ACID, PLASMA   MAGNESIUM   PHOSPHORUS   PROTIME-INR   B-TYPE NATRIURETIC PEPTIDE   LIPASE   TROPONIN I   PROCALCITONIN   SARS-COV-2 RNA AMPLIFICATION, QUAL        All Lab Results:  Results for orders placed or performed during the hospital encounter of 07/07/20   HIV 1/2 Ag/Ab (4th Gen)   Result Value Ref Range    HIV 1/2 Ag/Ab Negative Negative   CBC auto differential   Result Value Ref Range    WBC 5.69 3.90 - 12.70 K/uL    RBC 5.21 4.60 - 6.20 M/uL    Hemoglobin 14.8 14.0 - 18.0 g/dL    Hematocrit 46.5 40.0 - 54.0 %    Mean Corpuscular Volume 89 82 - 98 fL    Mean Corpuscular Hemoglobin 28.4 27.0 - 31.0 pg    Mean Corpuscular Hemoglobin Conc 31.8 (L) 32.0 - " 36.0 g/dL    RDW 14.3 11.5 - 14.5 %    Platelets 185 150 - 350 K/uL    MPV 10.1 9.2 - 12.9 fL    Immature Granulocytes 0.5 0.0 - 0.5 %    Gran # (ANC) 3.8 1.8 - 7.7 K/uL    Immature Grans (Abs) 0.03 0.00 - 0.04 K/uL    Lymph # 1.3 1.0 - 4.8 K/uL    Mono # 0.5 0.3 - 1.0 K/uL    Eos # 0.1 0.0 - 0.5 K/uL    Baso # 0.02 0.00 - 0.20 K/uL    nRBC 0 0 /100 WBC    Gran% 66.4 38.0 - 73.0 %    Lymph% 22.1 18.0 - 48.0 %    Mono% 9.5 4.0 - 15.0 %    Eosinophil% 1.1 0.0 - 8.0 %    Basophil% 0.4 0.0 - 1.9 %    Differential Method Automated    Comprehensive metabolic panel   Result Value Ref Range    Sodium 133 (L) 136 - 145 mmol/L    Potassium 3.6 3.5 - 5.1 mmol/L    Chloride 90 (L) 95 - 110 mmol/L    CO2 31 (H) 23 - 29 mmol/L    Glucose 194 (H) 70 - 110 mg/dL    BUN, Bld 21 8 - 23 mg/dL    Creatinine 1.9 (H) 0.5 - 1.4 mg/dL    Calcium 9.5 8.7 - 10.5 mg/dL    Total Protein 6.8 6.0 - 8.4 g/dL    Albumin 3.3 (L) 3.5 - 5.2 g/dL    Total Bilirubin 0.8 0.1 - 1.0 mg/dL    Alkaline Phosphatase 84 55 - 135 U/L    AST 23 10 - 40 U/L    ALT 35 10 - 44 U/L    Anion Gap 12 8 - 16 mmol/L    eGFR if African American 42 (A) >60 mL/min/1.73 m^2    eGFR if non African American 36 (A) >60 mL/min/1.73 m^2   Lactic acid, plasma #2   Result Value Ref Range    Lactate (Lactic Acid) 1.4 0.5 - 2.2 mmol/L   Magnesium   Result Value Ref Range    Magnesium 1.8 1.6 - 2.6 mg/dL   Phosphorus   Result Value Ref Range    Phosphorus 3.2 2.7 - 4.5 mg/dL   Protime-INR   Result Value Ref Range    Prothrombin Time 10.8 9.0 - 12.5 sec    INR 1.0 0.8 - 1.2   Brain natriuretic peptide   Result Value Ref Range    BNP 80 0 - 99 pg/mL   Lipase   Result Value Ref Range    Lipase 29 4 - 60 U/L   Troponin I   Result Value Ref Range    Troponin I 0.024 0.000 - 0.026 ng/mL   Procalcitonin   Result Value Ref Range    Procalcitonin 0.03 <0.25 ng/mL   COVID-19 Rapid Screening   Result Value Ref Range    SARS-CoV-2 RNA, Amplification, Qual Negative Negative     *Note: Due to a  "large number of results and/or encounters for the requested time period, some results have not been displayed. A complete set of results can be found in Results Review.       Imaging Results:  Imaging Results          X-Ray Chest AP Portable (Final result)  Result time 07/07/20 11:46:22    Final result by Yobani Tao MD (07/07/20 11:46:22)                 Impression:      No acute abnormality.      Electronically signed by: Yobani Tao  Date:    07/07/2020  Time:    11:46             Narrative:    EXAMINATION:  XR CHEST AP PORTABLE    CLINICAL HISTORY:  Sepsis;.    TECHNIQUE:  Single frontal portable view of the chest was performed.    COMPARISON:  07/06/2020    FINDINGS:  Support devices: None    The lungs are clear, with normal appearance of pulmonary vasculature and no pleural effusion or pneumothorax.    The cardiac silhouette is normal in size. The hilar and mediastinal contours are unremarkable.    Bones are intact.                                 The EKG was ordered, reviewed, and independently interpreted by the ED provider.  Interpretation time: 1117  Rate: 73 BPM  Rhythm: Normal sinus rhythm with 1st degree AV block  Interpretation: L axis deviation. Pulmonary disease pattern. Prolonged QT. No STEMI.         The Emergency Provider reviewed the vital signs and test results, which are outlined above.     ED Discussion     1:20 PM: Re-evaluated pt. Pt is requesting pain medication and "something for opioid withdrawals". Pt has no overt signs of opioid withdrawal at this point in time.    1:49 PM: Advised pt to continue taking abx as directed and f/u with PCP. Discussed with pt all pertinent ED information and results. Discussed pt dx and plan of tx. Gave pt all f/u and return to the ED instructions. All questions and concerns were addressed at this time. Pt expresses understanding of information and instructions, and is comfortable with plan to discharge. Pt is stable for discharge.    I discussed with " patient and/or family/caretaker that evaluation in the ED does not suggest any emergent or life threatening medical conditions requiring immediate intervention beyond what was provided in the ED, and I believe patient is safe for discharge.  Regardless, an unremarkable evaluation in the ED does not preclude the development or presence of a serious of life threatening condition. As such, patient was instructed to return immediately for any worsening or change in current symptoms.    Regarding PNEUMONIA, for treatment, I encouraged patient to refrain from smoking, drink plenty of fluids, rest, take medications as prescribed, and to use a humidifier or steam in the bathroom. Patient instructed to notify primary care provider if: they have a cough most days or have a cough that returns frequently; are coughing up blood; have a high fever or shaking chills; have a low-grade fever for three or more days; develop thick, greenish mucus, especially if it has a bad smell; and feel short of breath or have chest pain. For prevention, discussed with patient the importance of not smoking, getting annual influenza vaccines, reducing exposure to air pollution, and to frequently wash hands to avoid spread of infection.  Instructed patient to return to emergency department if they experience chest pain or shortness of breath and to follow up with primary care provider for re-evaluation.       Medical Decision Making:   Clinical Tests:   Lab Tests: Ordered and Reviewed  Radiological Study: Ordered and Reviewed  Medical Tests: Ordered and Reviewed           ED Medication(s):  Medications   sodium chloride 0.9% bolus 1,000 mL (0 mLs Intravenous Stopped 7/7/20 1401)   ondansetron injection 8 mg (8 mg Intravenous Given 7/7/20 1153)       Discharge Medication List as of 7/7/2020  1:46 PM          Follow-up Information     Yahir Calzada MD. Schedule an appointment as soon as possible for a visit in 1 week.    Specialty: Family  Medicine  Contact information:  [id number] [place] [address]  [phone]             Avel Estrada MD. Schedule an appointment as soon as possible for a visit in 1 week.    Specialty: Nephrology  Contact information:  [id number] [facility]  [place] [address]  [phone]                       Scribe Attestation:   Scribe #1: I performed the above scribed service and the documentation accurately describes the services I performed. I attest to the accuracy of the note.     Attending:   Physician Attestation Statement for Scribe #1: I, Marshall Scott Jr., MD, personally performed the services described in this documentation, as scribed by Lavelle Kamara, in my presence, and it is both accurate and complete.           Clinical Impression       ICD-10-CM ICD-9-CM   1. Pneumonia of left lower lobe due to infectious organism  J18.1 486   2. Near syncope  R55 780.2       Disposition:   Disposition: Discharged  Condition: Stable         Marshall Scott Jr., MD  07/07/20 1920

## 2020-07-07 NOTE — DISCHARGE INSTRUCTIONS
Regarding BRONCHITIS, for treatment, I encouraged patient to refrain from smoking, drink plenty of fluids, rest, take medications as prescribed, and to use a humidifier or steam in the bathroom. Patient instructed to notify primary care provider if: they have a cough most days or have a cough that returns frequently; are coughing up blood; have a high fever or shaking chills; have a low-grade fever for three or more days; develop thick, greenish mucus, especially if it has a bad smell; and feel short of breath or have chest pain. For prevention, discussed with patient the importance of not smoking, getting annual influenza vaccines, reducing exposure to air pollution, and to frequently wash hands to avoid spread of infection.  Instructed patient to return to emergency department if they experience chest pain or shortness of breath and to follow up with primary care provider for re-evaluation.    Regarding NEAR-SYNCOPE, although patient presents with a syncopal or near-syncopal episode, I have no suspicion that the event is secondary to an arrhythmia such as WPW, prolonged QT syndrome, or ventricular tachycardia. I have no suspicion for a seizure episode, intracranial hemorrhage, pulmonary embolus, myocardial infarction, sepsis, ectopic pregnancy, hemorrhagic shock, or hypoglycemia. Based on my evaluation, there is no emergent medical condition. Nonetheless, syncope precautions were discussed with the patient and/or caretaker, specifically not to swim or bathe unattended, not to operate motor vehicles or other machinery, and to avoid heights or other areas where falls may occur until cleared by primary care physician. Patient is safe for discharge. I explained to patient possible reasons for a syncopal episode including: coughing very hard; straining while having a bowel movement; have been standing in one place for too long; experiencing emotional distress or fear; in severe pain; taking certain medications  (anxiety, depression, high blood pressure, and allergies); drug or alcohol use; hyperventilation; hypoglycemia; seizures; dehydration; or standing up suddenly from a lying position. Encouraged pt to drink plenty of water and eat healthy diet.

## 2020-07-07 NOTE — ED PROVIDER NOTES
SCRIBE #1 NOTE: I, Lavelle Kamara, am scribing for, and in the presence of, Maria Eugenia Rand MD. I have scribed the entire note.       History     Chief Complaint   Patient presents with    Chest Pain     tightness x 3 days, was given 325 ASA and 1 NItro with relief     Review of patient's allergies indicates:  No Known Allergies      History of Present Illness     HPI    2020, 8:12 PM  History obtained from the patient      History of Present Illness: Lavelle Ladd is a 64 y.o. male patient with a PMHx of DINORAH, CAD, CHF, GERD, HTN, DM, who presents to the Emergency Department for evaluation of mid-sternal CP which onset progressively over the past 3 days. Symptoms are intermittent and moderate in severity. No mitigating or exacerbating factors reported. Associated sxs include SOB. Patient denies any fever, chills, sore throat, cough, n/v/d, HA, syncope, weakness, and all other sxs at this time. No further complaints or concerns at this time.       Arrival mode: EMS    PCP: Yahir Calzada MD        Past Medical History:  Past Medical History:   Diagnosis Date    DINORAH (acute kidney injury) 2016    Arthritis     CAD in native artery 2019    CHF (congestive heart failure)     Chronic obstructive pulmonary disease 2016    Coronary artery disease involving native coronary artery of native heart without angina pectoris 2016    CRI (chronic renal insufficiency) 2019     donor kidney transplant for DM 16     Induction with Thymo x3 and IV solumedrol to total 875mg  Kidney Biopsy  2016: 16 glomeruli, ACR type 1 AVR type 2, significant microcirculatory changes, c4d negative, No DSA, 5 to10% fibrosis. Treated with thymo x8 2016- no rejection      Diastolic heart failure     Encounter for blood transfusion     ESRD on RRT since 10/2013 10/29/2013    Biopsy proven diabetic nephropathy and lymphoplasmacytic interstitial infiltrate not c/w with AIN (ddx sjogrens or assoc  with tamm-horsefall protein extravasation)     GERD (gastroesophageal reflux disease)     History of hepatitis C, s/p successful Rx w/ SVR12 - 4/2017 4/5/2017    Completed 12 weeks harvoni w/ SVR    Hyperlipidemia     Hypertension     PAD (peripheral artery disease) 7/21/2019    PIC line (peripherally inserted central catheter) flush     Prophylactic immunotherapy     Proteinuria     PVD (peripheral vascular disease) 6/26/2017    RLE BKA CT 12/11/16 Extensive atherosclerotic disease of the aorta and mesenteric arteries.     Renal hypertension     Type 2 diabetes mellitus with diabetic neuropathy, with long-term current use of insulin 12/1/2016    Vitamin B12 deficiency        Past Surgical History:  Past Surgical History:   Procedure Laterality Date    AORTOGRAPHY WITH SERIALOGRAPHY N/A 6/14/2018    Procedure: LEFT LEG ANGIOGRAM;  Surgeon: Donal Mcdonald MD;  Location: [facility] CATH LAB;  Service: Vascular;  Laterality: N/A;    av bovine graft      Left UE    AV FISTULA PLACEMENT      left UE    CARDIAC CATHETERIZATION  02/2015    CLOSURE OF WOUND Left 9/24/2018    Procedure: CLOSURE, WOUND;  Surgeon: Karla Wheeler DPM;  Location: [facility] OR;  Service: Podiatry;  Laterality: Left;  Secondary Wound closure, extensive    CLOSURE OF WOUND Left 11/5/2018    Procedure: CLOSURE, WOUND;  Surgeon: Karla Wheeler DPM;  Location: [facility] OR;  Service: Podiatry;  Laterality: Left;    DEBRIDEMENT OF MULTIPLE METATARSAL BONES Left 11/5/2018    Procedure: DEBRIDEMENT, METATARSAL BONE, 2 OR MORE BONES;  Surgeon: Karla Wheeler DPM;  Location: [facility] OR;  Service: Podiatry;  Laterality: Left;    EXCISION OF SKIN Left 9/27/2019    Procedure: EXCISION, SKIN;  Surgeon: Lenard Alarcon MD;  Location: [address];  Service: Plastics;  Laterality: Left;  Plastics set, NIMS monitor, [employer]    FOOT AMPUTATION THROUGH METATARSAL Left 9/21/2018    Procedure: AMPUTATION, FOOT, TRANSMETATARSAL;  Surgeon: Karla WOODY  LASHONDA Wheeler;  Location: [facility] OR;  Service: Podiatry;  Laterality: Left;    FOOT AMPUTATION THROUGH METATARSAL Left 10/31/2018    Procedure: AMPUTATION, FOOT, TRANSMETATARSAL;  Surgeon: Karla Wheeler DPM;  Location: [facility] OR;  Service: Podiatry;  Laterality: Left;    FOOT AMPUTATION THROUGH METATARSAL Left 2018    Procedure: AMPUTATION, FOOT, TRANSMETATARSAL;  Surgeon: Karla Wheeler DPM;  Location: [facility] OR;  Service: Podiatry;  Laterality: Left;  revisional transmetatarsal amputation, Left foot    IRRIGATION AND DEBRIDEMENT OF LOWER EXTREMITY Left 10/31/2018    Procedure: IRRIGATION AND DEBRIDEMENT, LOWER EXTREMITY;  Surgeon: Karla Wheeler DPM;  Location: [facility] OR;  Service: Podiatry;  Laterality: Left;    KIDNEY TRANSPLANT  2016    LEFT HEART CATHETERIZATION Left 2019    Procedure: CATHETERIZATION, HEART, LEFT;  Surgeon: Andrew Valdez MD;  Location: [facility] CATH LAB;  Service: Cardiology;  Laterality: Left;    LEG AMPUTATION THROUGH KNEE      right LE, started as nail puncture leading to diabetic ulcer    SKIN FULL THICKNESS GRAFT Left 10/7/2019    Procedure: APPLICATION, GRAFT, SKIN, FULL-THICKNESS;  Surgeon: Lenard Alarcon MD;  Location: [address];  Service: Plastics;  Laterality: Left;    SURGICAL REMOVAL OF LESION OF FACE Right 10/7/2019    Procedure: EXCISION, LESION, FACE;  Surgeon: Lenard Alarcon MD;  [facility];  Service: Plastics;  Laterality: Right;         Family History:  Family History   Problem Relation Age of Onset    Cancer Father     Diabetes Father     Heart failure Father     Stroke Father     Heart failure Mother     Kidney disease Neg Hx        Social History:  Social History     Tobacco Use    Smoking status: Former Smoker     Packs/day: 1.00     Years: 40.00     Pack years: 40.00     Quit date: 2013     Years since quittin.4    Smokeless tobacco: Never Used   Substance and Sexual Activity    Alcohol use: Yes      Alcohol/week: 6.0 standard drinks     Types: 6 Cans of beer per week     Comment: seldom    Drug use: No    Sexual activity: Never        Review of Systems     Review of Systems   Constitutional: Negative for chills, diaphoresis and fever.   HENT: Negative for congestion, rhinorrhea and sore throat.    Respiratory: Positive for shortness of breath. Negative for cough.    Cardiovascular: Positive for chest pain.   Gastrointestinal: Negative for abdominal pain, diarrhea, nausea and vomiting.   Genitourinary: Negative for dysuria.   Musculoskeletal: Negative for back pain.   Skin: Negative for rash.   Neurological: Negative for dizziness, seizures, syncope, weakness, light-headedness, numbness and headaches.   Hematological: Does not bruise/bleed easily.   All other systems reviewed and are negative.       Physical Exam     Initial Vitals [07/06/20 1954]   BP Pulse Resp Temp SpO2   125/83 78 18 97.7 °F (36.5 °C) (!) 94 %      MAP       --          Physical Exam  Nursing Notes and Vital Signs Reviewed.  Constitutional: Patient is in no acute distress. Well-developed and well-nourished.  Head: Atraumatic. Normocephalic.  Eyes: PERRL. EOM intact. Conjunctivae are not pale. No scleral icterus.  ENT: Mucous membranes are moist. Oropharynx is clear and symmetric.    Neck: Supple. Full ROM. No lymphadenopathy.  Cardiovascular: 2+ edema LLE.  Regular rate. Regular rhythm. No murmurs, rubs, or gallops. Distal pulses are 2+ and symmetric.  Pulmonary/Chest: No respiratory distress. Clear to auscultation bilaterally. No wheezing or rales.  Abdominal: Soft and non-distended.  There is no tenderness.  No rebound, guarding, or rigidity. Good bowel sounds.  Genitourinary: No CVA tenderness  Musculoskeletal: R BKA noted.  Moves all extremities. No obvious deformities. No edema. No calf tenderness.  Skin: Warm and dry.  Neurological:  Alert, awake, and appropriate.  Normal speech.  No acute focal neurological deficits are  "appreciated.  Psychiatric: Normal affect. Good eye contact. Appropriate in content.     ED Course   Procedures  ED Vital Signs:  Vitals:    07/06/20 1954 07/06/20 2020 07/06/20 2027 07/06/20 2202   BP: 125/83   (!) 175/84   Pulse: 78 75  76   Resp: 18   16   Temp: 97.7 °F (36.5 °C)      TempSrc: Oral      SpO2: (!) 94%   95%   Weight: (!) 557.9 kg (1230 lb)  108.1 kg (238 lb 6.4 oz)    Height: 5' 11" (1.803 m)       07/06/20 2250 07/06/20 2254 07/06/20 2256 07/06/20 2311   BP: (!) 203/87 (!) 187/91  (!) 187/85   Pulse:   79    Resp:  18 20    Temp:       TempSrc:       SpO2:   97%    Weight:       Height:           Abnormal Lab Results:  Labs Reviewed   COMPREHENSIVE METABOLIC PANEL - Abnormal; Notable for the following components:       Result Value    Sodium 135 (*)     Potassium 3.0 (*)     Glucose 135 (*)     Creatinine 1.6 (*)     eGFR if  51 (*)     eGFR if non  44 (*)     All other components within normal limits   CBC W/ AUTO DIFFERENTIAL   TROPONIN I   B-TYPE NATRIURETIC PEPTIDE   SARS-COV-2 RNA AMPLIFICATION, QUAL        All Lab Results:  Results for orders placed or performed during the hospital encounter of 07/06/20   CBC auto differential   Result Value Ref Range    WBC 5.29 3.90 - 12.70 K/uL    RBC 5.61 4.60 - 6.20 M/uL    Hemoglobin 16.1 14.0 - 18.0 g/dL    Hematocrit 49.4 40.0 - 54.0 %    Mean Corpuscular Volume 88 82 - 98 fL    Mean Corpuscular Hemoglobin 28.7 27.0 - 31.0 pg    Mean Corpuscular Hemoglobin Conc 32.6 32.0 - 36.0 g/dL    RDW 14.1 11.5 - 14.5 %    Platelets 203 150 - 350 K/uL    MPV 9.8 9.2 - 12.9 fL    Immature Granulocytes 0.4 0.0 - 0.5 %    Gran # (ANC) 2.9 1.8 - 7.7 K/uL    Immature Grans (Abs) 0.02 0.00 - 0.04 K/uL    Lymph # 1.7 1.0 - 4.8 K/uL    Mono # 0.6 0.3 - 1.0 K/uL    Eos # 0.0 0.0 - 0.5 K/uL    Baso # 0.03 0.00 - 0.20 K/uL    nRBC 0 0 /100 WBC    Gran% 55.3 38.0 - 73.0 %    Lymph% 31.8 18.0 - 48.0 %    Mono% 11.3 4.0 - 15.0 %    Eosinophil% " 0.6 0.0 - 8.0 %    Basophil% 0.6 0.0 - 1.9 %    Differential Method Automated    Comprehensive metabolic panel   Result Value Ref Range    Sodium 135 (L) 136 - 145 mmol/L    Potassium 3.0 (L) 3.5 - 5.1 mmol/L    Chloride 97 95 - 110 mmol/L    CO2 24 23 - 29 mmol/L    Glucose 135 (H) 70 - 110 mg/dL    BUN, Bld 17 8 - 23 mg/dL    Creatinine 1.6 (H) 0.5 - 1.4 mg/dL    Calcium 9.1 8.7 - 10.5 mg/dL    Total Protein 7.4 6.0 - 8.4 g/dL    Albumin 3.6 3.5 - 5.2 g/dL    Total Bilirubin 0.6 0.1 - 1.0 mg/dL    Alkaline Phosphatase 88 55 - 135 U/L    AST 31 10 - 40 U/L    ALT 40 10 - 44 U/L    Anion Gap 14 8 - 16 mmol/L    eGFR if African American 51 (A) >60 mL/min/1.73 m^2    eGFR if non African American 44 (A) >60 mL/min/1.73 m^2   Troponin I #1   Result Value Ref Range    Troponin I 0.019 0.000 - 0.026 ng/mL   B-Type natriuretic peptide (BNP)   Result Value Ref Range    BNP 91 0 - 99 pg/mL   COVID-19 Rapid Screening   Result Value Ref Range    SARS-CoV-2 RNA, Amplification, Qual Negative Negative     *Note: Due to a large number of results and/or encounters for the requested time period, some results have not been displayed. A complete set of results can be found in Results Review.       Imaging Results:  Imaging Results          X-Ray Chest AP Portable (Final result)  Result time 07/06/20 21:09:17    Final result by ANA CRISTINA Joseph Sr., MD (07/06/20 21:09:17)                 Impression:      1. There has been interval development of a mild amount of haziness in the base of the left lung.  This is characteristic of atelectasis or pneumonia.  2. The size of the heart is prominent.  This may be secondary to magnification.  .      Electronically signed by: Porter Joseph MD  Date:    07/06/2020  Time:    21:09             Narrative:    EXAMINATION:  XR CHEST AP PORTABLE    CLINICAL HISTORY:  Chest Pain;    COMPARISON:  06/24/2020    FINDINGS:  The size of the heart is prominent.  There has been interval development of a mild  amount of haziness in the base of the left lung.  The right lung is clear.  There is no pneumothorax.  The costophrenic angles are sharp.                                 The EKG was ordered, reviewed, and independently interpreted by the ED provider.  Interpretation time: 2025  Rate: 77 BPM  Rhythm: sinus rhythm with 1st degree AV block  Interpretation: L axis deviation. Possible anterolateral infarct, age undetermined. No STEMI.           The Emergency Provider reviewed the vital signs and test results, which are outlined above.     ED Discussion       9:42 PM: Discussed with pt all pertinent ED information and results. Discussed pt dx and plan of tx. Gave pt all f/u and return to the ED instructions. All questions and concerns were addressed at this time. Pt expresses understanding of information and instructions, and is comfortable with plan to discharge. Pt is stable for discharge.    I discussed with patient and/or family/caretaker that evaluation in the ED does not suggest any emergent or life threatening medical conditions requiring immediate intervention beyond what was provided in the ED, and I believe patient is safe for discharge.  Regardless, an unremarkable evaluation in the ED does not preclude the development or presence of a serious of life threatening condition. As such, patient was instructed to return immediately for any worsening or change in current symptoms.         Medical Decision Making:   Clinical Tests:   Lab Tests: Ordered and Reviewed  Radiological Study: Ordered and Reviewed  Medical Tests: Ordered and Reviewed           ED Medication(s):  Medications   potassium chloride SA CR tablet 40 mEq (40 mEq Oral Given 7/6/20 2221)   cloNIDine tablet 0.2 mg (0.2 mg Oral Given 7/6/20 2250)       Discharge Medication List as of 7/6/2020  9:42 PM      START taking these medications    Details   levoFLOXacin (LEVAQUIN) 750 MG tablet Take 1 tablet (750 mg total) by mouth once daily. for 5 days,  Starting Mon 7/6/2020, Until Sat 7/11/2020, Print             Follow-up Information     Yahir Calzada MD In 2 days.    Specialty: Family Medicine  Contact information:  [address]  [place] [address]  [phone]             [facility] - .    Specialty: Emergency Medicine  Why: As needed, If symptoms worsen  Contact information:  [address]  [facility] [address] [phone]                     Scribe Attestation:   Scribe #1: I performed the above scribed service and the documentation accurately describes the services I performed. I attest to the accuracy of the note.     Attending:   Physician Attestation Statement for Scribe #1: I, Maria Eugenia Rand MD, personally performed the services described in this documentation, as scribed by Lavelle Kamara, in my presence, and it is both accurate and complete.           Clinical Impression       ICD-10-CM ICD-9-CM   1. Hypokalemia  E87.6 276.8   2. Chest pain  R07.9 786.50   3. Pneumonia of left lower lobe due to infectious organism  J18.1 486       Disposition:   Disposition: Discharged  Condition: Stable         Maria Eugenia Rand MD  07/08/20 0402

## 2020-07-07 NOTE — TELEPHONE ENCOUNTER
----- Message from Charbel Garcia sent at 7/7/2020 10:57 AM CDT -----  Contact: Deanne with diabetic managment and supplies  Deanne with diabetic management and supplies Is needing to get back the medical necessity for pt. Pt is needing a glucose monitor     Daenne can be reached at [phone] ext [phone]

## 2020-07-08 ENCOUNTER — TELEPHONE (OUTPATIENT)
Dept: TRANSPLANT | Facility: CLINIC | Age: 65
End: 2020-07-08

## 2020-07-08 ENCOUNTER — TELEPHONE (OUTPATIENT)
Dept: INTERNAL MEDICINE | Facility: CLINIC | Age: 65
End: 2020-07-08

## 2020-07-08 ENCOUNTER — OFFICE VISIT (OUTPATIENT)
Dept: INTERNAL MEDICINE | Facility: CLINIC | Age: 65
End: 2020-07-08
Payer: MEDICARE

## 2020-07-08 VITALS
SYSTOLIC BLOOD PRESSURE: 118 MMHG | DIASTOLIC BLOOD PRESSURE: 78 MMHG | WEIGHT: 230 LBS | HEART RATE: 80 BPM | HEIGHT: 71 IN | TEMPERATURE: 98 F | BODY MASS INDEX: 32.2 KG/M2 | OXYGEN SATURATION: 98 %

## 2020-07-08 DIAGNOSIS — Z79.4 TYPE 2 DIABETES MELLITUS WITH STABLE PROLIFERATIVE RETINOPATHY OF BOTH EYES, WITH LONG-TERM CURRENT USE OF INSULIN: Primary | ICD-10-CM

## 2020-07-08 DIAGNOSIS — F11.93 OPIOID WITHDRAWAL: ICD-10-CM

## 2020-07-08 DIAGNOSIS — J18.9 PNEUMONIA OF LEFT LOWER LOBE DUE TO INFECTIOUS ORGANISM: ICD-10-CM

## 2020-07-08 DIAGNOSIS — E11.3553 TYPE 2 DIABETES MELLITUS WITH STABLE PROLIFERATIVE RETINOPATHY OF BOTH EYES, WITH LONG-TERM CURRENT USE OF INSULIN: Primary | ICD-10-CM

## 2020-07-08 LAB
FINAL PATHOLOGIC DIAGNOSIS: NORMAL
GROSS: NORMAL
MICROSCOPIC EXAM: NORMAL

## 2020-07-08 PROCEDURE — 3078F PR MOST RECENT DIASTOLIC BLOOD PRESSURE < 80 MM HG: ICD-10-PCS | Mod: HCNC,CPTII,S$GLB, | Performed by: FAMILY MEDICINE

## 2020-07-08 PROCEDURE — 3074F SYST BP LT 130 MM HG: CPT | Mod: HCNC,CPTII,S$GLB, | Performed by: FAMILY MEDICINE

## 2020-07-08 PROCEDURE — 3008F PR BODY MASS INDEX (BMI) DOCUMENTED: ICD-10-PCS | Mod: HCNC,CPTII,S$GLB, | Performed by: FAMILY MEDICINE

## 2020-07-08 PROCEDURE — 99499 UNLISTED E&M SERVICE: CPT | Mod: HCNC,S$GLB,, | Performed by: FAMILY MEDICINE

## 2020-07-08 PROCEDURE — 99214 OFFICE O/P EST MOD 30 MIN: CPT | Mod: HCNC,S$GLB,, | Performed by: FAMILY MEDICINE

## 2020-07-08 PROCEDURE — 99499 RISK ADDL DX/OHS AUDIT: ICD-10-PCS | Mod: HCNC,S$GLB,, | Performed by: FAMILY MEDICINE

## 2020-07-08 PROCEDURE — 99999 PR PBB SHADOW E&M-EST. PATIENT-LVL V: CPT | Mod: PBBFAC,HCNC,, | Performed by: FAMILY MEDICINE

## 2020-07-08 PROCEDURE — 3078F DIAST BP <80 MM HG: CPT | Mod: HCNC,CPTII,S$GLB, | Performed by: FAMILY MEDICINE

## 2020-07-08 PROCEDURE — 1126F PR PAIN SEVERITY QUANTIFIED, NO PAIN PRESENT: ICD-10-PCS | Mod: HCNC,S$GLB,, | Performed by: FAMILY MEDICINE

## 2020-07-08 PROCEDURE — 1126F AMNT PAIN NOTED NONE PRSNT: CPT | Mod: HCNC,S$GLB,, | Performed by: FAMILY MEDICINE

## 2020-07-08 PROCEDURE — 3046F PR MOST RECENT HEMOGLOBIN A1C LEVEL > 9.0%: ICD-10-PCS | Mod: HCNC,CPTII,S$GLB, | Performed by: FAMILY MEDICINE

## 2020-07-08 PROCEDURE — 3046F HEMOGLOBIN A1C LEVEL >9.0%: CPT | Mod: HCNC,CPTII,S$GLB, | Performed by: FAMILY MEDICINE

## 2020-07-08 PROCEDURE — 1101F PR PT FALLS ASSESS DOC 0-1 FALLS W/OUT INJ PAST YR: ICD-10-PCS | Mod: HCNC,CPTII,S$GLB, | Performed by: FAMILY MEDICINE

## 2020-07-08 PROCEDURE — 1159F PR MEDICATION LIST DOCUMENTED IN MEDICAL RECORD: ICD-10-PCS | Mod: HCNC,S$GLB,, | Performed by: FAMILY MEDICINE

## 2020-07-08 PROCEDURE — 99214 PR OFFICE/OUTPT VISIT, EST, LEVL IV, 30-39 MIN: ICD-10-PCS | Mod: HCNC,S$GLB,, | Performed by: FAMILY MEDICINE

## 2020-07-08 PROCEDURE — 3074F PR MOST RECENT SYSTOLIC BLOOD PRESSURE < 130 MM HG: ICD-10-PCS | Mod: HCNC,CPTII,S$GLB, | Performed by: FAMILY MEDICINE

## 2020-07-08 PROCEDURE — 1159F MED LIST DOCD IN RCRD: CPT | Mod: HCNC,S$GLB,, | Performed by: FAMILY MEDICINE

## 2020-07-08 PROCEDURE — 99999 PR PBB SHADOW E&M-EST. PATIENT-LVL V: ICD-10-PCS | Mod: PBBFAC,HCNC,, | Performed by: FAMILY MEDICINE

## 2020-07-08 PROCEDURE — 1101F PT FALLS ASSESS-DOCD LE1/YR: CPT | Mod: HCNC,CPTII,S$GLB, | Performed by: FAMILY MEDICINE

## 2020-07-08 PROCEDURE — 3008F BODY MASS INDEX DOCD: CPT | Mod: HCNC,CPTII,S$GLB, | Performed by: FAMILY MEDICINE

## 2020-07-08 RX ORDER — OXYCODONE AND ACETAMINOPHEN 10; 325 MG/1; MG/1
TABLET ORAL
COMMUNITY
Start: 2020-05-28 | End: 2022-01-01 | Stop reason: ALTCHOICE

## 2020-07-08 RX ORDER — HYDROXYZINE HYDROCHLORIDE 25 MG/1
25 TABLET, FILM COATED ORAL 3 TIMES DAILY PRN
Qty: 45 TABLET | Refills: 0 | Status: SHIPPED | OUTPATIENT
Start: 2020-07-08 | End: 2020-07-22

## 2020-07-08 RX ORDER — BUPRENORPHINE HYDROCHLORIDE 600 UG/1
FILM, SOLUBLE BUCCAL
COMMUNITY
Start: 2020-05-20 | End: 2022-01-01

## 2020-07-08 NOTE — TELEPHONE ENCOUNTER
Patient C/O SOB getting worse and recently diagnose with pneumonia.  Patient voice understanding to call  or present to the closest ER now for further evaluation.     ----- Message from Savannah Panda sent at 7/8/2020  9:46 AM CDT -----  Pt is calling to speak to coordinator stated he has been in and out of the hospital and feels the need to go back but he does not want to go to the urgent care he feels that they may not treat him due to his kidney transplant he wants to speak with coordinator on what to do next      Pt contact [phone]

## 2020-07-08 NOTE — TELEPHONE ENCOUNTER
----- Message from Gricelda Meyers sent at 7/8/2020  9:04 AM CDT -----  Contact: Patient  Type:  Needs Medical Advice    Who Called: Lavelle Ladd  Symptoms (please be specific): F/U appointment    How long has patient had these symptoms:  Pneumonia   Pharmacy name and phone #:  N/A  Would the patient rather a call back or a response via [facility]? Call back   Best Call Back Number: [phone] (Home)   Additional Information: Patient has an appointment today 7/8/20  2 pm with Yahir Villafuerte. Patient wanted to know if he sees the doctor today......   Will the Doctor admit him to the hospital.??? Patient is saying that the ER physicians keeps sending him home

## 2020-07-08 NOTE — TELEPHONE ENCOUNTER
"Hi,    Patient complains of chest tightness, SOB.  He is now having feelings of "giving up" because he is feeling burned out.  Spoke with Dr. Calzada who wants you to have final decision as to patient's next steps.  He has been to ER twice this week and sent home.  Please advise  "

## 2020-07-08 NOTE — PROGRESS NOTES
Subjective:       Patient ID: Lavelle Ladd is a 64 y.o. male.    Chief Complaint: Hospital Follow Up and Pneumonia    Pt is a 64 year old who is having pneumonia and has some sleep apnea that is been having some issues with breathing and reports SOB with syncope. Xray had showed in past possible pneumonia but resolved with no treatment. Pt is on levaquin at this point. Pt 02 is 98%. Pt had some type of adjustment of pain medications that may have perpetuated some withdrawal.     Review of Systems   Constitutional: Negative.    Eyes: Negative.    Respiratory: Negative.    Cardiovascular: Negative.    Musculoskeletal: Negative.    Hematological: Negative.    Psychiatric/Behavioral: The patient is nervous/anxious.          Objective:      Physical Exam  Constitutional:       Appearance: Normal appearance.   Neck:      Musculoskeletal: Normal range of motion and neck supple.   Cardiovascular:      Rate and Rhythm: Normal rate and regular rhythm.   Pulmonary:      Effort: Pulmonary effort is normal.      Breath sounds: Normal breath sounds.   Abdominal:      General: Abdomen is flat.      Palpations: Abdomen is soft.   Neurological:      General: No focal deficit present.      Mental Status: He is alert and oriented to person, place, and time.   Psychiatric:         Mood and Affect: Mood is anxious.         Assessment:       1. 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 Pneumonia of left lower lobe due to infectious organism    3. 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  Potentially anxious and mismanagement of meds. It is unclear at this time. Hydroxyzine for anxiety    Other orders  -     hydrOXYzine HCL (ATARAX) 25 MG tablet; Take 1 tablet (25 mg total) by mouth 3 (three) times daily as needed for Itching.  Dispense: 45 tablet; Refill: 0

## 2020-07-08 NOTE — TELEPHONE ENCOUNTER
----- Message from Zamzam Reyes sent at 7/8/2020 10:36 AM CDT -----  Regarding: pt advice  Contact: marilynn Wong called to consult with nurse about going back to ER instead of the doctor appointment. Please call pt back at [phone]. Thanks tp

## 2020-07-12 LAB — BACTERIA BLD CULT: NORMAL

## 2020-07-14 ENCOUNTER — TELEPHONE (OUTPATIENT)
Dept: DIABETES | Facility: CLINIC | Age: 65
End: 2020-07-14

## 2020-07-14 DIAGNOSIS — Z79.4 TYPE 2 DIABETES MELLITUS WITH RIGHT EYE AFFECTED BY RETINOPATHY AND MACULAR EDEMA, WITH LONG-TERM CURRENT USE OF INSULIN, UNSPECIFIED RETINOPATHY SEVERITY: Primary | ICD-10-CM

## 2020-07-14 DIAGNOSIS — E11.311 TYPE 2 DIABETES MELLITUS WITH RIGHT EYE AFFECTED BY RETINOPATHY AND MACULAR EDEMA, WITH LONG-TERM CURRENT USE OF INSULIN, UNSPECIFIED RETINOPATHY SEVERITY: Primary | ICD-10-CM

## 2020-07-14 NOTE — TELEPHONE ENCOUNTER
----- Message from Jimmie Veloz sent at 7/14/2020  3:28 PM CDT -----  Regarding: Advice  Contact: Patient [phone]  Patient would like to get medical advice.  Symptoms (please be specific):    How long has patient had these symptoms:    Pharmacy name and phone #:    Any drug allergies:    Comments: Patient calling regarding was inform to call the office once received the Glucose monitor machine, to help with setting up, call to discuss.    Please call an advise  Thank you

## 2020-07-22 RX ORDER — HYDROXYZINE HYDROCHLORIDE 25 MG/1
TABLET, FILM COATED ORAL
Qty: 45 TABLET | Refills: 0 | Status: SHIPPED | OUTPATIENT
Start: 2020-07-22 | End: 2022-01-01

## 2020-07-28 ENCOUNTER — PATIENT OUTREACH (OUTPATIENT)
Dept: ADMINISTRATIVE | Facility: OTHER | Age: 65
End: 2020-07-28

## 2020-07-28 DIAGNOSIS — Z12.11 ENCOUNTER FOR FIT (FECAL IMMUNOCHEMICAL TEST) SCREENING: Primary | ICD-10-CM

## 2020-07-28 NOTE — PROGRESS NOTES
Chart Reviewed  Care Everywhere updated  Immunizations reconciled  Health Maintenance updated  Fit kit ordered

## 2020-07-29 ENCOUNTER — OFFICE VISIT (OUTPATIENT)
Dept: DERMATOLOGY | Facility: CLINIC | Age: 65
End: 2020-07-29
Payer: MEDICARE

## 2020-07-29 ENCOUNTER — LAB VISIT (OUTPATIENT)
Dept: LAB | Facility: HOSPITAL | Age: 65
End: 2020-07-29
Attending: INTERNAL MEDICINE
Payer: MEDICARE

## 2020-07-29 DIAGNOSIS — G47.33 OBSTRUCTIVE SLEEP APNEA: ICD-10-CM

## 2020-07-29 DIAGNOSIS — L57.0 ACTINIC KERATOSIS: ICD-10-CM

## 2020-07-29 DIAGNOSIS — D49.2 SKIN NEOPLASM: Primary | ICD-10-CM

## 2020-07-29 DIAGNOSIS — D84.9 IMMUNOSUPPRESSED STATUS: ICD-10-CM

## 2020-07-29 DIAGNOSIS — Z94.0 S/P KIDNEY TRANSPLANT: ICD-10-CM

## 2020-07-29 DIAGNOSIS — Z85.828 HISTORY OF NONMELANOMA SKIN CANCER: ICD-10-CM

## 2020-07-29 DIAGNOSIS — L21.9 SEBORRHEIC DERMATITIS: ICD-10-CM

## 2020-07-29 LAB
ALBUMIN SERPL BCP-MCNC: 3.3 G/DL (ref 3.5–5.2)
ANION GAP SERPL CALC-SCNC: 15 MMOL/L (ref 8–16)
BASOPHILS # BLD AUTO: 0.02 K/UL (ref 0–0.2)
BASOPHILS NFR BLD: 0.4 % (ref 0–1.9)
BUN SERPL-MCNC: 13 MG/DL (ref 8–23)
CALCIUM SERPL-MCNC: 9.4 MG/DL (ref 8.7–10.5)
CHLORIDE SERPL-SCNC: 100 MMOL/L (ref 95–110)
CO2 SERPL-SCNC: 26 MMOL/L (ref 23–29)
CREAT SERPL-MCNC: 1.7 MG/DL (ref 0.5–1.4)
DIFFERENTIAL METHOD: NORMAL
EOSINOPHIL # BLD AUTO: 0.1 K/UL (ref 0–0.5)
EOSINOPHIL NFR BLD: 1.2 % (ref 0–8)
ERYTHROCYTE [DISTWIDTH] IN BLOOD BY AUTOMATED COUNT: 14.5 % (ref 11.5–14.5)
EST. GFR  (AFRICAN AMERICAN): 47 ML/MIN/1.73 M^2
EST. GFR  (NON AFRICAN AMERICAN): 41 ML/MIN/1.73 M^2
GLUCOSE SERPL-MCNC: 188 MG/DL (ref 70–110)
HCT VFR BLD AUTO: 48.3 % (ref 40–54)
HGB BLD-MCNC: 15.7 G/DL (ref 14–18)
IMM GRANULOCYTES # BLD AUTO: 0.01 K/UL (ref 0–0.04)
IMM GRANULOCYTES NFR BLD AUTO: 0.2 % (ref 0–0.5)
LYMPHOCYTES # BLD AUTO: 1.3 K/UL (ref 1–4.8)
LYMPHOCYTES NFR BLD: 25.9 % (ref 18–48)
MCH RBC QN AUTO: 29.5 PG (ref 27–31)
MCHC RBC AUTO-ENTMCNC: 32.5 G/DL (ref 32–36)
MCV RBC AUTO: 91 FL (ref 82–98)
MONOCYTES # BLD AUTO: 0.7 K/UL (ref 0.3–1)
MONOCYTES NFR BLD: 13.3 % (ref 4–15)
NEUTROPHILS # BLD AUTO: 3 K/UL (ref 1.8–7.7)
NEUTROPHILS NFR BLD: 59.2 % (ref 38–73)
NRBC BLD-RTO: 0 /100 WBC
PHOSPHATE SERPL-MCNC: 2.9 MG/DL (ref 2.7–4.5)
PLATELET # BLD AUTO: 187 K/UL (ref 150–350)
PMV BLD AUTO: 10.1 FL (ref 9.2–12.9)
POTASSIUM SERPL-SCNC: 3.7 MMOL/L (ref 3.5–5.1)
PTH-INTACT SERPL-MCNC: 292 PG/ML (ref 9–77)
RBC # BLD AUTO: 5.32 M/UL (ref 4.6–6.2)
SODIUM SERPL-SCNC: 141 MMOL/L (ref 136–145)
WBC # BLD AUTO: 5.02 K/UL (ref 3.9–12.7)

## 2020-07-29 PROCEDURE — 82610 CYSTATIN C: CPT | Mod: HCNC

## 2020-07-29 PROCEDURE — 85025 COMPLETE CBC W/AUTO DIFF WBC: CPT | Mod: HCNC

## 2020-07-29 PROCEDURE — 99213 OFFICE O/P EST LOW 20 MIN: CPT | Mod: 25,HCNC,S$GLB, | Performed by: DERMATOLOGY

## 2020-07-29 PROCEDURE — 80069 RENAL FUNCTION PANEL: CPT | Mod: HCNC

## 2020-07-29 PROCEDURE — 88305 TISSUE EXAM BY PATHOLOGIST: ICD-10-PCS | Mod: 26,HCNC,, | Performed by: PATHOLOGY

## 2020-07-29 PROCEDURE — 1159F MED LIST DOCD IN RCRD: CPT | Mod: HCNC,S$GLB,, | Performed by: DERMATOLOGY

## 2020-07-29 PROCEDURE — 99213 PR OFFICE/OUTPT VISIT, EST, LEVL III, 20-29 MIN: ICD-10-PCS | Mod: 25,HCNC,S$GLB, | Performed by: DERMATOLOGY

## 2020-07-29 PROCEDURE — 83970 ASSAY OF PARATHORMONE: CPT | Mod: HCNC

## 2020-07-29 PROCEDURE — 99999 PR PBB SHADOW E&M-EST. PATIENT-LVL IV: ICD-10-PCS | Mod: PBBFAC,HCNC,, | Performed by: DERMATOLOGY

## 2020-07-29 PROCEDURE — 88305 TISSUE EXAM BY PATHOLOGIST: CPT | Mod: 26,HCNC,, | Performed by: PATHOLOGY

## 2020-07-29 PROCEDURE — 99999 PR PBB SHADOW E&M-EST. PATIENT-LVL IV: CPT | Mod: PBBFAC,HCNC,, | Performed by: DERMATOLOGY

## 2020-07-29 PROCEDURE — 86352 CELL FUNCTION ASSAY W/STIM: CPT | Mod: HCNC

## 2020-07-29 PROCEDURE — 1101F PT FALLS ASSESS-DOCD LE1/YR: CPT | Mod: HCNC,CPTII,S$GLB, | Performed by: DERMATOLOGY

## 2020-07-29 PROCEDURE — 11102 TANGNTL BX SKIN SINGLE LES: CPT | Mod: HCNC,S$GLB,, | Performed by: DERMATOLOGY

## 2020-07-29 PROCEDURE — 80197 ASSAY OF TACROLIMUS: CPT | Mod: HCNC

## 2020-07-29 PROCEDURE — 1159F PR MEDICATION LIST DOCUMENTED IN MEDICAL RECORD: ICD-10-PCS | Mod: HCNC,S$GLB,, | Performed by: DERMATOLOGY

## 2020-07-29 PROCEDURE — 36415 COLL VENOUS BLD VENIPUNCTURE: CPT | Mod: HCNC

## 2020-07-29 PROCEDURE — 1101F PR PT FALLS ASSESS DOC 0-1 FALLS W/OUT INJ PAST YR: ICD-10-PCS | Mod: HCNC,CPTII,S$GLB, | Performed by: DERMATOLOGY

## 2020-07-29 PROCEDURE — 88305 TISSUE EXAM BY PATHOLOGIST: CPT | Mod: HCNC | Performed by: PATHOLOGY

## 2020-07-29 PROCEDURE — 11102 PR TANGENTIAL BIOPSY, SKIN, SINGLE LESION: ICD-10-PCS | Mod: HCNC,S$GLB,, | Performed by: DERMATOLOGY

## 2020-07-29 RX ORDER — KETOCONAZOLE 20 MG/G
CREAM TOPICAL 2 TIMES DAILY
Qty: 60 G | Refills: 1 | Status: ON HOLD | OUTPATIENT
Start: 2020-07-29 | End: 2022-01-01 | Stop reason: HOSPADM

## 2020-07-29 RX ORDER — CEPHALEXIN 500 MG/1
CAPSULE ORAL
COMMUNITY
Start: 2020-07-20 | End: 2022-01-01 | Stop reason: ALTCHOICE

## 2020-07-29 RX ORDER — HYDROCORTISONE 25 MG/G
CREAM TOPICAL 2 TIMES DAILY
Qty: 30 G | Refills: 1 | Status: SHIPPED | OUTPATIENT
Start: 2020-07-29 | End: 2022-01-01 | Stop reason: ALTCHOICE

## 2020-07-29 NOTE — PROGRESS NOTES
Subjective:       Patient ID:  Lavelle Ladd is a 65 y.o. male who presents for   Chief Complaint   Patient presents with    Follow-up     surgery      History of Present Illness: The patient presents with chief complaint of lesion.  Location: right temple and right ear  Duration: months  Signs/Symptoms: crusty    Prior treatments: none    This is a high risk patient with H/O kidney transplant on immunosuppression here to check for the development of new lesions. 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    He returns to clinic today for f/u ulcer of amputation stump. He states it has healed well with daily wound care.   Also, today he c/o new sore on left cheek. Present for weeks, constant, bleeding. Has not been treated previously.     Additionally he c/o red scaly rash on face and forehead.     Review of Systems   Constitutional: Negative for malaise.   Skin: Positive for dry skin.        Objective:    Physical Exam   Constitutional: He appears well-developed and well-nourished. No distress.   Neurological: He is alert and oriented to person, place, and time.   Psychiatric: He has a normal mood and affect.   Skin:   Areas Examined (abnormalities noted in diagram):   Scalp / Hair Palpated and Inspected  Head / Face Inspection Performed  Neck Inspection Performed  Back Inspection Performed  RUE Inspected  LUE Inspection Performed    Left cheek: eroded pink plaque  Bilat, cheeks, forehead, scalp: diffuse erythematous patches with rough scale             Diagram Legend     Erythematous scaling macule/papule c/w actinic keratosis       Vascular papule c/w angioma      Pigmented verrucoid papule/plaque c/w seborrheic keratosis      Yellow umbilicated papule c/w sebaceous hyperplasia      Irregularly shaped tan macule c/w lentigo     1-2 mm smooth white papules  consistent with Milia      Movable subcutaneous cyst with punctum c/w epidermal inclusion cyst      Subcutaneous movable cyst c/w pilar cyst      Firm pink to brown papule c/w dermatofibroma      Pedunculated fleshy papule(s) c/w skin tag(s)      Evenly pigmented macule c/w junctional nevus     Mildly variegated pigmented, slightly irregular-bordered macule c/w mildly atypical nevus      Flesh colored to evenly pigmented papule c/w intradermal nevus       Pink pearly papule/plaque c/w basal cell carcinoma      Erythematous hyperkeratotic cursted plaque c/w SCC      Surgical scar with no sign of skin cancer recurrence      Open and closed comedones      Inflammatory papules and pustules      Verrucoid papule consistent consistent with wart     Erythematous eczematous patches and plaques     Dystrophic onycholytic nail with subungual debris c/w onychomycosis     Umbilicated papule    Erythematous-base heme-crusted tan verrucoid plaque consistent with inflamed seborrheic keratosis     Erythematous Silvery Scaling Plaque c/w Psoriasis     See annotation      Assessment / Plan:      Pathology Orders:     Normal Orders This Visit    Specimen to Pathology, Dermatology     Comments:    Number of Specimens:->1  ------------------------->-------------------------  Spec 1 Procedure:->Biopsy  Spec 1 Clinical Impression:->SCC, BCC  Spec 1 Source:->left cheek    Questions:    Procedure Type: Dermatology and skin neoplasms    Number of Specimens: 1    ------------------------: -------------------------    Spec 1 Procedure: Biopsy    Spec 1 Clinical Impression: SCC, BCC    Spec 1 Source: left cheek    Clinical Information: eroded pink plaque        Skin neoplasm, left cheek  -     SHAVE BIOPSY  -     Specimen to Pathology, Dermatology    Shave biopsy procedure note:    Shave biopsy to two lesions performed after verbal consent including risk of infection, scar, recurrence, need for additional treatment of site. Area prepped with  alcohol, anesthetized with approximately 1.0cc of 1% lidocaine with epinephrine. Lesional tissue shaved with razor blade. Hemostasis achieved with application of aluminum chloride. No complications. Dressing applied. Wound care explained.      Ulcer, skin, chronic, limited to breakdown of skin, right leg  - improved from previous  - appears to be healing well following trauma  - continue wound care with non-stick dressing and gauze until healed, daily cleansing with mild soap  -     mupirocin (BACTROBAN) 2 % ointment; Apply topically once daily. With dressing changes  Dispense: 22 g; Refill: 1    Seborrheic Dermatitis  - likely interspersed among field cancerization/actinic keratoses  - start ketoconazole 2% cream BID  - start hydrocortisone 2.5% cream BID    Actinic Keratoses  - numerous lesions of forehead and scalp, complicated by overlying dermatitis  - I do recommend field treatment in the winter months  - he has discussed PDT with Dr. Hill in the past and we may need to get him scheduled for this vs Efudex cream treatment    History of nonmelanoma skin cancer  - SCC, left temple s/p excision and adjuvant radiation  - SCC, right ear and right temple s/p MMS  - NER  - continue to monitor closely for the development of new lesions    Kidney transplant status  - at increased risk for skin cancer 2/2 immunosuppression  - on Cellcept and prograf, patient states he is working with transplant/renal team to adjust immunosuppression  - discussed Nicotinamide supplement for prevention  - may need to consider chemoprevention with acitretin given recent multiple SCCs including high risk lesions, will discuss with renal, transplant, PCP if patient interested  - recommend routine monitoring           RTC 3 months

## 2020-07-30 LAB — TACROLIMUS BLD-MCNC: 4.7 NG/ML (ref 5–15)

## 2020-07-31 LAB
CYSTATIN C SERPL-MCNC: 1.83 MG/L (ref 0.77–1.42)
FINAL PATHOLOGIC DIAGNOSIS: NORMAL
GFR/BSA.PRED SERPLBLD CYS-BASED-ARV: 34 ML/MIN/BSA
GROSS: NORMAL
IMMUNKNOW (STIMULATED): 268 NG/ML (ref 226–524)
MICROSCOPIC EXAM: NORMAL

## 2020-08-11 ENCOUNTER — PATIENT OUTREACH (OUTPATIENT)
Dept: ADMINISTRATIVE | Facility: OTHER | Age: 65
End: 2020-08-11

## 2020-08-11 NOTE — PROGRESS NOTES
Updates were requested from care everywhere.  Chart was reviewed for overdue Proactive [facility] Encounters (CHRISTINA) topics.  Health Maintenance has been updated.  LINKS immunization registry triggered.  Immunizations were reconciled.

## 2020-08-12 ENCOUNTER — HOSPITAL ENCOUNTER (OUTPATIENT)
Dept: RADIOLOGY | Facility: HOSPITAL | Age: 65
Discharge: HOME | End: 2020-08-12
Attending: INTERNAL MEDICINE
Payer: MEDICARE

## 2020-08-12 ENCOUNTER — OFFICE VISIT (OUTPATIENT)
Dept: NEPHROLOGY | Facility: CLINIC | Age: 65
End: 2020-08-12
Payer: MEDICARE

## 2020-08-12 VITALS
SYSTOLIC BLOOD PRESSURE: 122 MMHG | DIASTOLIC BLOOD PRESSURE: 66 MMHG | HEIGHT: 71 IN | BODY MASS INDEX: 31.36 KG/M2 | WEIGHT: 224 LBS | HEART RATE: 101 BPM

## 2020-08-12 DIAGNOSIS — R60.0 LEG EDEMA, LEFT: ICD-10-CM

## 2020-08-12 DIAGNOSIS — I73.9 PAD (PERIPHERAL ARTERY DISEASE): ICD-10-CM

## 2020-08-12 DIAGNOSIS — Z71.89 ENCOUNTER FOR MEDICATION REVIEW AND COUNSELING: ICD-10-CM

## 2020-08-12 DIAGNOSIS — N18.30 STAGE 3 CHRONIC KIDNEY DISEASE: ICD-10-CM

## 2020-08-12 DIAGNOSIS — R06.02 SHORTNESS OF BREATH: ICD-10-CM

## 2020-08-12 DIAGNOSIS — D84.9 IMMUNOSUPPRESSION: ICD-10-CM

## 2020-08-12 DIAGNOSIS — G47.33 OBSTRUCTIVE SLEEP APNEA: ICD-10-CM

## 2020-08-12 DIAGNOSIS — Z94.0 S/P KIDNEY TRANSPLANT: Primary | ICD-10-CM

## 2020-08-12 PROCEDURE — 1101F PR PT FALLS ASSESS DOC 0-1 FALLS W/OUT INJ PAST YR: ICD-10-PCS | Mod: HCNC,CPTII,S$GLB, | Performed by: INTERNAL MEDICINE

## 2020-08-12 PROCEDURE — 99999 PR PBB SHADOW E&M-EST. PATIENT-LVL V: CPT | Mod: PBBFAC,HCNC,, | Performed by: INTERNAL MEDICINE

## 2020-08-12 PROCEDURE — 3074F SYST BP LT 130 MM HG: CPT | Mod: HCNC,CPTII,S$GLB, | Performed by: INTERNAL MEDICINE

## 2020-08-12 PROCEDURE — 99999 PR PBB SHADOW E&M-EST. PATIENT-LVL V: ICD-10-PCS | Mod: PBBFAC,HCNC,, | Performed by: INTERNAL MEDICINE

## 2020-08-12 PROCEDURE — 3078F DIAST BP <80 MM HG: CPT | Mod: HCNC,CPTII,S$GLB, | Performed by: INTERNAL MEDICINE

## 2020-08-12 PROCEDURE — 1125F PR PAIN SEVERITY QUANTIFIED, PAIN PRESENT: ICD-10-PCS | Mod: HCNC,S$GLB,, | Performed by: INTERNAL MEDICINE

## 2020-08-12 PROCEDURE — 99291 CRITICAL CARE FIRST HOUR: CPT | Mod: HCNC,,, | Performed by: INTERNAL MEDICINE

## 2020-08-12 PROCEDURE — 3078F PR MOST RECENT DIASTOLIC BLOOD PRESSURE < 80 MM HG: ICD-10-PCS | Mod: HCNC,CPTII,S$GLB, | Performed by: INTERNAL MEDICINE

## 2020-08-12 PROCEDURE — 1159F PR MEDICATION LIST DOCUMENTED IN MEDICAL RECORD: ICD-10-PCS | Mod: HCNC,S$GLB,, | Performed by: INTERNAL MEDICINE

## 2020-08-12 PROCEDURE — 3074F PR MOST RECENT SYSTOLIC BLOOD PRESSURE < 130 MM HG: ICD-10-PCS | Mod: HCNC,CPTII,S$GLB, | Performed by: INTERNAL MEDICINE

## 2020-08-12 PROCEDURE — 71046 XR CHEST PA AND LATERAL: ICD-10-PCS | Mod: 26,HCNC,, | Performed by: RADIOLOGY

## 2020-08-12 PROCEDURE — 71046 X-RAY EXAM CHEST 2 VIEWS: CPT | Mod: 26,HCNC,, | Performed by: RADIOLOGY

## 2020-08-12 PROCEDURE — 1159F MED LIST DOCD IN RCRD: CPT | Mod: HCNC,S$GLB,, | Performed by: INTERNAL MEDICINE

## 2020-08-12 PROCEDURE — 3008F PR BODY MASS INDEX (BMI) DOCUMENTED: ICD-10-PCS | Mod: HCNC,CPTII,S$GLB, | Performed by: INTERNAL MEDICINE

## 2020-08-12 PROCEDURE — 3008F BODY MASS INDEX DOCD: CPT | Mod: HCNC,CPTII,S$GLB, | Performed by: INTERNAL MEDICINE

## 2020-08-12 PROCEDURE — 99499 UNLISTED E&M SERVICE: CPT | Mod: HCNC,S$GLB,, | Performed by: INTERNAL MEDICINE

## 2020-08-12 PROCEDURE — 99291 PR CRITICAL CARE, E/M 30-74 MINUTES: ICD-10-PCS | Mod: HCNC,,, | Performed by: INTERNAL MEDICINE

## 2020-08-12 PROCEDURE — 1125F AMNT PAIN NOTED PAIN PRSNT: CPT | Mod: HCNC,S$GLB,, | Performed by: INTERNAL MEDICINE

## 2020-08-12 PROCEDURE — 71046 X-RAY EXAM CHEST 2 VIEWS: CPT | Mod: TC,HCNC

## 2020-08-12 PROCEDURE — 99499 RISK ADDL DX/OHS AUDIT: ICD-10-PCS | Mod: HCNC,S$GLB,, | Performed by: INTERNAL MEDICINE

## 2020-08-12 PROCEDURE — 1101F PT FALLS ASSESS-DOCD LE1/YR: CPT | Mod: HCNC,CPTII,S$GLB, | Performed by: INTERNAL MEDICINE

## 2020-08-12 RX ORDER — TORSEMIDE 20 MG/1
40 TABLET ORAL DAILY
Qty: 60 TABLET | Refills: 11 | Status: SHIPPED | OUTPATIENT
Start: 2020-08-12 | End: 2022-01-01 | Stop reason: ALTCHOICE

## 2020-08-12 RX ORDER — MODAFINIL 200 MG/1
200 TABLET ORAL DAILY
Qty: 30 TABLET | Refills: 2 | Status: SHIPPED | OUTPATIENT
Start: 2020-08-12 | End: 2020-09-13

## 2020-08-12 RX ORDER — AMOXICILLIN 250 MG
2 CAPSULE ORAL DAILY
Qty: 60 TABLET | Refills: 3 | Status: SHIPPED | OUTPATIENT
Start: 2020-08-12 | End: 2022-01-01

## 2020-08-12 NOTE — PROGRESS NOTES
Subjective:       Patient ID: Lavelle Ladd is a 65 y.o. White male who presents for follow-up evaluation of Kidney Transplant and Follow-up    Hypertension  Pertinent negatives include no chest pain, headaches, neck pain, palpitations or shortness of breath.      Patient is a 64-year-old white male with ESRD from diabetic nephropathy biopsy-proven status post hemodialysis since 2013.  Patient underwent cadaveric kidney transplant in May 2016 and had received induction with Thymo x3 and IV solumedrol to total 875mg. his postoperative creatinine came down to 2.4.  In the last part of May 2016 creatinine went up to 3.0 again.  Patient underwent transplant kidney biopsy on 31st of May.    Kidney Biopsy   5/31/2016: 16 glomeruli, ACR type 1 AVR type 2, significant microcirculatory changes, c4d negative, No DSA, 5 to10% fibrosis. Treated with thymo x 8  6/21/2016- no rejection  8/15/2016 repeat  kidney transplant biopsy:  Micro-circulation inflammation +C4d Acute and chronic Antibody mediated rejection + interstitial infiltrates suspicious for ACR ; + CYA toxicity evidence     In July the best creatinine had come down to 1.4.  Most recent creatinine has gone up to 1.7.  He has had elevated levels of Prograf which have been adjusted by transplant nephrologist in [place].  He had a  hospitalization with hypertension uncontrolled, fluid overload and severe hyperglycemia.  Patient is currently being managed on Prograf, CellCept and steroids.  He was discharged home on NPH insulin 14 units 3 times a day and regular insulin 4 units with each meal 3 times a day.  He has had occasional hypoglycemia.  Mostly his sugars have been running between 250-350.    7/20/16 comes to the clinic to reestablish himself off to the kidney transplant.  All records reviewed in detail; insulin increased     August 2016 treatment for his hepatitis C genotype 1B evaluated by hepatology -records reviewed ( Jennifer Scheuermann, PA ); had a  liver biopsy     8/15/2016 repeat  kidney transplant biopsy:  Micro-circulation inflammation +C4d Acute and chronic Antibody mediated rejection + interstitial infiltrates suspicious for ACR ; + CYA toxicity evidence     Prograf 1.5 BID ; cell cept being held and restarted now ; lower frequency of Bactrim ; c/o very low energy     8/2016 admitted for IV solumederol boluses 5mg /kg daily x 3 days ; also treated with neupogen for leucopenia now back for fup and DINORAH is better and creatinine down to 1.5 ; prograf is 3.4 ( was 11 on 10/31/16 )     6/2020 s/p multipla SCCA ; lower cell cept 250 bid ; lower prograf 1 mg bid ( 1.5 bid) fup 1 month   -We discussed his immunosuppression is likely contributing to the rapid growth of his skin cancer.  His CellCept was lowered today.  -Repeat Immuknow cylex in 1 month to see if further reduction of immunosuppression is feasible without jeopardizing the allograft.    08/2020 s/p levaquin. Leg swelling.  nuvigil 150. ( severe MARIA INES --1/2020) despite the fact we cut the immunosuppression down he is having repeated infections and pneumonia.    Review of Systems   Constitutional: Negative.  Negative for activity change, appetite change, chills, diaphoresis, fatigue and fever.   HENT: Negative.  Negative for congestion and trouble swallowing.    Eyes: Negative.    Respiratory: Negative.  Negative for cough, chest tightness, +shortness of breath and wheezing.    Cardiovascular: Negative.  Negative for chest pain, palpitations and leg swelling.   Gastrointestinal: Negative.  Negative for abdominal distention, abdominal pain, nausea and vomiting.   Genitourinary: Negative.  Negative for decreased urine volume, difficulty urinating, dysuria, enuresis, flank pain, frequency, hematuria, penile swelling, scrotal swelling and urgency.   Musculoskeletal: Negative.  Negative for arthralgias, back pain, joint swelling and neck pain.   Skin: Negative for rash.   Neurological: Negative.  Negative for  "tremors, seizures and headaches.   Psychiatric/Behavioral: Negative.  Negative for confusion and sleep disturbance. The patient is not nervous/anxious.        Objective:     /66   Pulse 101   Ht 5' 11" (1.803 m)   Wt 101.6 kg (224 lb)   BMI 31.24 kg/m²      Physical Exam  Constitutional:       General: He is not in acute distress.     Appearance: He is well-developed.   HENT:      Head: Normocephalic.   Eyes:      Pupils: Pupils are equal, round, and reactive to light.   Neck:      Musculoskeletal: Normal range of motion and neck supple.      Thyroid: No thyromegaly.      Vascular: No JVD.   Cardiovascular:      Rate and Rhythm: Normal rate and regular rhythm.      Chest Wall: PMI is not displaced.      Heart sounds: Normal heart sounds, S1 normal and S2 normal. No murmur. No friction rub. No gallop.    Pulmonary:      Effort: Pulmonary effort is normal. No respiratory distress.      Breath sounds: Normal breath sounds. No wheezing or rales.   Chest:      Chest wall: No tenderness.   Abdominal:      General: There is no distension.      Palpations: There is no mass.      Tenderness: There is no abdominal tenderness. There is no rebound.      Hernia: No hernia is present.   Musculoskeletal: Normal range of motion.         General: No tenderness. 2-3+ edema left leg      Comments: Right below-knee amputation  Left upper arm fistula has clotted   Lymphadenopathy:      Cervical: No cervical adenopathy.   Skin:     General: Skin is warm and dry.      Findings: No erythema or rash.      Comments:  SCCA removed   Neurological:      Mental Status: He is alert and oriented to person, place, and time. He is not disoriented.      Cranial Nerves: No cranial nerve deficit.      Motor: No abnormal muscle tone.      Coordination: Coordination abnormal.      Deep Tendon Reflexes: Reflexes abnormal.   Psychiatric:         Behavior: Behavior normal.         Thought Content: Thought content normal.         Judgment: Judgment " normal.       in motorized WC     Lab Results   Component Value Date    CREATININE 1.7 (H) 2020    BUN 13 2020     2020    K 3.7 2020     2020    CO2 26 2020     Lab Results   Component Value Date    WBC 5.02 2020    HGB 15.7 2020    HCT 48.3 2020    MCV 91 2020     2020     Lab Results   Component Value Date    .0 (H) 2020    CALCIUM 9.4 2020    CAION 1.10 2016    PHOS 2.9 2020     Prograf level is 3.4  vitamin D level is 27 CMV serologies reviewed ; C-peptide 5.7; hemoglobin A1c was 7.4 as of 2016 and 11.4 in 2016    Cystatin C shows GFR 25 % when creatinine was 1.9     Assessment:    )    1.  donor kidney transplant for DM 16    2. Stage 3 chronic kidney disease    3. Obstructive sleep apnea    4. Immunosuppression    5. Encounter for medication review and counseling    6. Leg edema, left    7. Shortness of breath        Plan:         1.   Cadaveric kidney transplant status post acute rejection: Status post induction with Thymoglobulin and treatment of rejection with, globulin and steroids as well.   Patient had a repeat kidney biopsy: Micro-circulation inflammation +C4d Acute and chronic Antibody mediated rejection + interstitial infiltrates suspicious for ACR ; + CYA toxicity evidence. S/p IV SM boluses with successful resolution.  I have reviewed the transplant clinic note from .  His Prograf level 4.7 : his  Prograf dosing  is now taking 1.0 mg twice daily    Adequate . Keep Cell cept 250 bid hold for infection       2.  Hyperparathyroidism  : check vitamin D level on follow-up once stable may consider PTH surgery       3.  Hypertension much better controlled      4. Edema : on lasix 40 bid but still SOB. [facility] reviewed 2019 ( med Rx) Stress 2019. Reviewed all records of . Try Torsemide 20 mg 2-3 per day --- today check CXR     5. Constipation :  pericolace + pain management appt consult palliative care     Overall patient has very poor quality of life.  Patient has had multiple hospitalizations.  He has coronary artery disease but last heart catheterization showed no severe blockage needing any intervention but he continues to have chest pain off and on.  Has had multiple hospitalization and ER visits for chest pain.  He also has severe circulation issues with severe neuropathy and claudication pains.  He has been using narcotics and has been having complications including loss of consciousness, dizziness, severe constipation.  Today he has swelling of the leg which is very difficult to treat with diuretics.  I have increased and changed diuretics.  He also continues to have off and on chest pain which is not lasting long enough to qualify for him to be in the hospital.  He seems to be extremely miserable with peripheral arterial disease and diabetic neuropathy.  On the examination of his arms he has poor circulation but no gangrene of the hand.  His pulses are very weak both hands he is probably not a candidate for surgical revision or intervention.  He is unable to lay down in his bed because of shortness of breath PND orthopnea and severe sleep apnea.  He was given Ritalin in the past.  He has not been able to achieve remission for his extremely severe sleep apnea as I have reviewed the sleep study done in January 2020.  I am going to provide him with Provigil.  He is critically ill and has very poor quality of life.  I would consult palliative Care for options and prepare him for options in the coming months.  I would expect palliative care to also regulate his pain management and help him avoid side effects which she is currently having including syncope, loss of consciousness, dizziness, failure to thrive and severe constipation.  Critical care time spent is 30 min.  The critical care time was mostly spent in discussing all the above very complex  medical problems and therapeutic options.  This critical care time was in addition to the regular time required for clinic visit.  Total time spent is about 1 hr and 10 min.        fup in 1 months         Avel Estrada MD

## 2020-08-14 ENCOUNTER — TELEPHONE (OUTPATIENT)
Dept: NEPHROLOGY | Facility: CLINIC | Age: 65
End: 2020-08-14

## 2020-08-14 ENCOUNTER — TELEPHONE (OUTPATIENT)
Dept: PHARMACY | Facility: CLINIC | Age: 65
End: 2020-08-14

## 2020-08-14 NOTE — TELEPHONE ENCOUNTER
Good Morning,     The prior authorization for Lavelle Ladd's Modainil 200mg prescription has been APPROVED FROM 1/1/2020 TO 12/31/2020 with copayment of $20.90.       Patient has been notified of the decision on 8/14/2020 and patient states he will  medication today at [facility].    If there are any additional questions or concerns, please contact me.    Thank You!   Indira Chappell CPhT, B.A  Patient Care Advocate   [facility]  Phone: [phone] Ext [phone]  Fax: [phone]

## 2020-08-14 NOTE — TELEPHONE ENCOUNTER
----- Message from Ankita Dimas sent at 8/14/2020  2:00 PM CDT -----  Regarding: Advice  Contact: pt  Reason: Pt says the laxative he's taking isn't working and ask for a call to advise     Communication: [phone]

## 2020-08-14 NOTE — TELEPHONE ENCOUNTER
Returned call to patient. He states that he started taking the laxative on Wednesday and still no BM. Gas pressure has release per patient. He wants to know if you can call in something else?  I did advise him to give it about 3 days to see. Advise if you want to call in something.

## 2020-08-15 ENCOUNTER — NURSE TRIAGE (OUTPATIENT)
Dept: ADMINISTRATIVE | Facility: CLINIC | Age: 65
End: 2020-08-15

## 2020-08-15 NOTE — TELEPHONE ENCOUNTER
BPA#5 used  Patient states he has had difficulty using the restroom for the past 10 days with no BM for 4 days. Has the urge and is uncomfortable. Took Linzess as prescribed this am with no results. Would like to know if he can take another dose? Advised per protocol. Dr. Badillo notified and unable to recommend any medications states constipation is not his field of expertise, offered OTC meds. Patient notified and states he will follow disposition and see someone in 24 hours if no results.     Reason for Disposition   Last bowel movement (BM) > 4 days ago    Additional Information   Negative: [1] Vomiting AND [2] abdomen looks much more swollen than usual   Negative: [1] Vomiting AND [2] contains bile (green color)   Negative: [1] Constant abdominal pain AND [2] present > 2 hours   Negative: [1] Rectal pain or fullness from fecal impaction (rectum full of stool) AND [2] NOT better after SITZ bath, suppository or enema   Negative: [1] Intermittent mild abdominal pain AND [2] fever   Negative: Abdomen is more swollen than usual    Protocols used: CONSTIPATION-A-AH

## 2020-08-15 NOTE — TELEPHONE ENCOUNTER
Reason for Disposition   Caller has already spoken with another triager and has no further questions.    Additional Information   Negative: Caller is angry or rude (e.g., hangs up, verbally abusive, yelling)   Negative: Caller hangs up   Negative: Caller has already spoken with the PCP and has no further questions.    Protocols used: NO CONTACT OR DUPLICATE CONTACT CALL-A-   Kidney Transplant - 5/21/2016  BPA n/a   CC: pt states he has no further questions after speaking with triage nurse earlier whooffered OTC meds. Patient states he will go to ED if no help in 24 hours. call back with questions

## 2020-08-24 RX ORDER — ATORVASTATIN CALCIUM 80 MG/1
80 TABLET, FILM COATED ORAL DAILY
Qty: 90 TABLET | Refills: 1 | Status: SHIPPED | OUTPATIENT
Start: 2020-08-24 | End: 2020-11-24 | Stop reason: SDUPTHER

## 2020-09-02 ENCOUNTER — PATIENT OUTREACH (OUTPATIENT)
Dept: ADMINISTRATIVE | Facility: HOSPITAL | Age: 65
End: 2020-09-02

## 2020-09-02 NOTE — PROGRESS NOTES
Contacted patient to schedule follow up appointment for DM Eye Exam. Patient has an appointment scheduled 09/21/2020

## 2020-09-08 ENCOUNTER — INITIAL CONSULT (OUTPATIENT)
Dept: PALLIATIVE MEDICINE | Facility: CLINIC | Age: 65
End: 2020-09-08
Payer: MEDICARE

## 2020-09-08 VITALS
TEMPERATURE: 98 F | HEART RATE: 86 BPM | SYSTOLIC BLOOD PRESSURE: 148 MMHG | RESPIRATION RATE: 20 BRPM | DIASTOLIC BLOOD PRESSURE: 99 MMHG

## 2020-09-08 DIAGNOSIS — R45.89 ANXIETY ABOUT HEALTH: Primary | ICD-10-CM

## 2020-09-08 DIAGNOSIS — I73.9 PAD (PERIPHERAL ARTERY DISEASE): ICD-10-CM

## 2020-09-08 DIAGNOSIS — F33.1 MODERATE EPISODE OF RECURRENT MAJOR DEPRESSIVE DISORDER: ICD-10-CM

## 2020-09-08 PROCEDURE — 99999 PR PBB SHADOW E&M-EST. PATIENT-LVL V: CPT | Mod: PBBFAC,HCNC,, | Performed by: FAMILY MEDICINE

## 2020-09-08 PROCEDURE — 1101F PT FALLS ASSESS-DOCD LE1/YR: CPT | Mod: HCNC,CPTII,S$GLB, | Performed by: FAMILY MEDICINE

## 2020-09-08 PROCEDURE — 3080F DIAST BP >= 90 MM HG: CPT | Mod: HCNC,CPTII,S$GLB, | Performed by: FAMILY MEDICINE

## 2020-09-08 PROCEDURE — 99499 UNLISTED E&M SERVICE: CPT | Mod: HCNC,S$GLB,, | Performed by: FAMILY MEDICINE

## 2020-09-08 PROCEDURE — 99497 ADVNCD CARE PLAN 30 MIN: CPT | Mod: HCNC,S$GLB,, | Performed by: FAMILY MEDICINE

## 2020-09-08 PROCEDURE — 1125F PR PAIN SEVERITY QUANTIFIED, PAIN PRESENT: ICD-10-PCS | Mod: HCNC,S$GLB,, | Performed by: FAMILY MEDICINE

## 2020-09-08 PROCEDURE — 99999 PR PBB SHADOW E&M-EST. PATIENT-LVL V: ICD-10-PCS | Mod: PBBFAC,HCNC,, | Performed by: FAMILY MEDICINE

## 2020-09-08 PROCEDURE — 3077F SYST BP >= 140 MM HG: CPT | Mod: HCNC,CPTII,S$GLB, | Performed by: FAMILY MEDICINE

## 2020-09-08 PROCEDURE — 3080F PR MOST RECENT DIASTOLIC BLOOD PRESSURE >= 90 MM HG: ICD-10-PCS | Mod: HCNC,CPTII,S$GLB, | Performed by: FAMILY MEDICINE

## 2020-09-08 PROCEDURE — 99499 RISK ADDL DX/OHS AUDIT: ICD-10-PCS | Mod: HCNC,S$GLB,, | Performed by: FAMILY MEDICINE

## 2020-09-08 PROCEDURE — 1101F PR PT FALLS ASSESS DOC 0-1 FALLS W/OUT INJ PAST YR: ICD-10-PCS | Mod: HCNC,CPTII,S$GLB, | Performed by: FAMILY MEDICINE

## 2020-09-08 PROCEDURE — 1125F AMNT PAIN NOTED PAIN PRSNT: CPT | Mod: HCNC,S$GLB,, | Performed by: FAMILY MEDICINE

## 2020-09-08 PROCEDURE — 3077F PR MOST RECENT SYSTOLIC BLOOD PRESSURE >= 140 MM HG: ICD-10-PCS | Mod: HCNC,CPTII,S$GLB, | Performed by: FAMILY MEDICINE

## 2020-09-08 PROCEDURE — 1159F PR MEDICATION LIST DOCUMENTED IN MEDICAL RECORD: ICD-10-PCS | Mod: HCNC,S$GLB,, | Performed by: FAMILY MEDICINE

## 2020-09-08 PROCEDURE — 99497 PR ADVNCD CARE PLAN 30 MIN: ICD-10-PCS | Mod: HCNC,S$GLB,, | Performed by: FAMILY MEDICINE

## 2020-09-08 PROCEDURE — 1159F MED LIST DOCD IN RCRD: CPT | Mod: HCNC,S$GLB,, | Performed by: FAMILY MEDICINE

## 2020-09-08 PROCEDURE — 99205 PR OFFICE/OUTPT VISIT, NEW, LEVL V, 60-74 MIN: ICD-10-PCS | Mod: HCNC,S$GLB,, | Performed by: FAMILY MEDICINE

## 2020-09-08 PROCEDURE — 99205 OFFICE O/P NEW HI 60 MIN: CPT | Mod: HCNC,S$GLB,, | Performed by: FAMILY MEDICINE

## 2020-09-08 RX ORDER — SERTRALINE HYDROCHLORIDE 25 MG/1
25 TABLET, FILM COATED ORAL DAILY
Qty: 30 TABLET | Refills: 11 | Status: SHIPPED | OUTPATIENT
Start: 2020-09-08 | End: 2021-07-16 | Stop reason: SDUPTHER

## 2020-09-08 NOTE — LETTER
September 9, 2020      Avel Estrada MD  [address]  [place] [address]           [address] [address]  Phone: [phone]  Fax: [phone]          Patient: Lavelle Ladd   MR Number: [id number]   YOB: 1955   Date of Visit: 9/8/2020       Dear Dr. Avel Estrada:    Thank you for referring Lavelle Ladd to me for evaluation. Attached you will find relevant portions of my assessment and plan of care.    If you have questions, please do not hesitate to call me. I look forward to following Lavelle Ladd along with you.    Sincerely,    Sherry Rasmussen MD    Enclosure  CC:  No Recipients    If you would like to receive this communication electronically, please contact [email or web address] or [phone] to request more information on [email or web address] access.    For providers and/or their staff who would like to refer a patient to [facility], please contact us through our one-stop-shop provider referral line, [facility], at [phone].    If you feel you have received this communication in error or would no longer like to receive these types of communications, please e-mail [email or web address]

## 2020-09-08 NOTE — PROGRESS NOTES
"Subjective:       Patient ID: Lavelle Ladd is a 65 y.o. male.    Chief Complaint: initial palliative consult    64 yo male seen for initial palliative consultation, referred by his nephrologist, Dr. Estrada. He has multiple medical problems including transplanted kidney, CAD, CHF, PAD s/p RLE BKA (now mobility impaired--unable to ambulate with prosthesis due to pain), severe MARIA INES but has not been successful in getting the Bipap recommended on his titration sleep study. He has had multiple ER visits due to feeling short of breath; he elucidates this further by describing falling asleep and waking gasping for air and feeling panicked. He admits to strong depressive feelings as well as anxiety about his health. He acknowledges that his ER visits have been driven by a strong sense of anxiety. He occasional has suicidal ideations but denies any intention or plan to commit suicide and acknowledges that more days are good than bad.     He lives with his girlfriend, Luz Marina Mercado. He also has a long haired dachsund named Red who he describes as his best friend. [employer] football is his main interest. He is still able to drive himself, but rarely goes anywhere but the doctor's office.      He takes hydrocodone 10/325 every 8 hours but reports he only gets minimal relief and reports he "feels awful" toward the time that his next dose is due--nausea, pain all over. He sees Lamar Guadalupe MD at [facility] for pain management needs and has an upcoming appointment. I offered to take over pain medication management and he will contact me if he'd like to transfer opiate prescriptions to me following his next conversation with Dr. Guadalupe.     Advance Care Planning  We discussed the purpose and benefit of completing ACP documents. By ORAL DECLARATION he would like to make his niece, Kecia Williamson, ([phone]). He states that he has had a document made and will bring it to his next visit if he can locate it.               does not " have any pertinent problems on file.  Past Medical History:   Diagnosis Date    DINORAH (acute kidney injury) 2016    Arthritis     CAD in native artery 2019    CHF (congestive heart failure)     Chronic obstructive pulmonary disease 2016    Coronary artery disease involving native coronary artery of native heart without angina pectoris 2016    CRI (chronic renal insufficiency) 2019     donor kidney transplant for DM 16     Induction with Thymo x3 and IV solumedrol to total 875mg  Kidney Biopsy  2016: 16 glomeruli, ACR type 1 AVR type 2, significant microcirculatory changes, c4d negative, No DSA, 5 to10% fibrosis. Treated with thymo x8 2016- no rejection      Diastolic heart failure     Encounter for blood transfusion     ESRD on RRT since 10/2013 10/29/2013    Biopsy proven diabetic nephropathy and lymphoplasmacytic interstitial infiltrate not c/w with AIN (ddx sjogrens or assoc with tamm-horsefall protein extravasation)     GERD (gastroesophageal reflux disease)     History of hepatitis C, s/p successful Rx w/ SVR12 - 2017    Completed 12 weeks harvoni w/ SVR    Hyperlipidemia     Hypertension     PAD (peripheral artery disease) 2019    PIC line (peripherally inserted central catheter) flush     Prophylactic immunotherapy     Proteinuria     PVD (peripheral vascular disease) 2017    RLE BKA CT 16 Extensive atherosclerotic disease of the aorta and mesenteric arteries.     Renal hypertension     Type 2 diabetes mellitus with diabetic neuropathy, with long-term current use of insulin 2016    Vitamin B12 deficiency      Past Surgical History:   Procedure Laterality Date    AORTOGRAPHY WITH SERIALOGRAPHY N/A 2018    Procedure: LEFT LEG ANGIOGRAM;  Surgeon: Donal Mcdonald MD;  Location: [facility] CATH LAB;  Service: Vascular;  Laterality: N/A;    av bovine graft      Left UE    AV FISTULA PLACEMENT      left UE    CARDIAC  CATHETERIZATION  02/2015    CLOSURE OF WOUND Left 9/24/2018    Procedure: CLOSURE, WOUND;  Surgeon: Karla Wheeler DPM;  Location: [facility] OR;  Service: Podiatry;  Laterality: Left;  Secondary Wound closure, extensive    CLOSURE OF WOUND Left 11/5/2018    Procedure: CLOSURE, WOUND;  Surgeon: Karla Wheeler DPM;  Location: [facility] OR;  Service: Podiatry;  Laterality: Left;    DEBRIDEMENT OF MULTIPLE METATARSAL BONES Left 11/5/2018    Procedure: DEBRIDEMENT, METATARSAL BONE, 2 OR MORE BONES;  Surgeon: Karla Wheeler DPM;  Location: [facility] OR;  Service: Podiatry;  Laterality: Left;    EXCISION OF SKIN Left 9/27/2019    Procedure: EXCISION, SKIN;  Surgeon: Lenard Alarcon MD;  Location: [address];  Service: Plastics;  Laterality: Left;  Plastics set, NIMS monitor, [employer]    FOOT AMPUTATION THROUGH METATARSAL Left 9/21/2018    Procedure: AMPUTATION, FOOT, TRANSMETATARSAL;  Surgeon: Karla Wheeler DPM;  Location: [facility] OR;  Service: Podiatry;  Laterality: Left;    FOOT AMPUTATION THROUGH METATARSAL Left 10/31/2018    Procedure: AMPUTATION, FOOT, TRANSMETATARSAL;  Surgeon: Karla Wheeler DPM;  Location: [facility] OR;  Service: Podiatry;  Laterality: Left;    FOOT AMPUTATION THROUGH METATARSAL Left 11/5/2018    Procedure: AMPUTATION, FOOT, TRANSMETATARSAL;  Surgeon: Karla Wheeler DPM;  Location: [facility] OR;  Service: Podiatry;  Laterality: Left;  revisional transmetatarsal amputation, Left foot    IRRIGATION AND DEBRIDEMENT OF LOWER EXTREMITY Left 10/31/2018    Procedure: IRRIGATION AND DEBRIDEMENT, LOWER EXTREMITY;  Surgeon: Karla Wheeler DPM;  Location: [facility] OR;  Service: Podiatry;  Laterality: Left;    KIDNEY TRANSPLANT  05/21/2016    LEFT HEART CATHETERIZATION Left 7/21/2019    Procedure: CATHETERIZATION, HEART, LEFT;  Surgeon: Andrew Valdez MD;  Location: [facility] CATH LAB;  Service: Cardiology;  Laterality: Left;    LEG AMPUTATION THROUGH KNEE  2011    right LE, started as nail  puncture leading to diabetic ulcer    SKIN FULL THICKNESS GRAFT Left 10/7/2019    Procedure: APPLICATION, GRAFT, SKIN, FULL-THICKNESS;  Surgeon: Lenard Alarcon MD;  Location: [facility] OR [address] [place] [address];  Service: Plastics;  Laterality: Left;    SURGICAL REMOVAL OF LESION OF FACE Right 10/7/2019    Procedure: EXCISION, LESION, FACE;  Surgeon: Lenard Alarcon MD;  Location: [facility] OR [address] [place] [address];  Service: Plastics;  Laterality: Right;     Family History   Problem Relation Age of Onset    Cancer Father     Diabetes Father     Heart failure Father     Stroke Father     Heart failure Mother     Kidney disease Neg Hx      Social History     Socioeconomic History    Marital status: Single     Spouse name: Not on file    Number of children: Not on file    Years of education: Not on file    Highest education level: Not on file   Occupational History    Occupation: Disabled     Employer: DISABLED   Social Needs    Financial resource strain: Not on file    Food insecurity     Worry: Not on file     Inability: Not on file    Transportation needs     Medical: Not on file     Non-medical: Not on file   Tobacco Use    Smoking status: Former Smoker     Packs/day: 1.00     Years: 40.00     Pack years: 40.00     Quit date: 2013     Years since quittin.6    Smokeless tobacco: Never Used   Substance and Sexual Activity    Alcohol use: Yes     Alcohol/week: 6.0 standard drinks     Types: 6 Cans of beer per week     Comment: seldom    Drug use: No    Sexual activity: Never   Lifestyle    Physical activity     Days per week: Not on file     Minutes per session: Not on file    Stress: Not on file   Relationships    Social connections     Talks on phone: Not on file     Gets together: Not on file     Attends Hindu service: Not on file     Active member of club or organization: Not on file     Attends meetings of clubs or organizations: Not on file     Relationship status: Not on file   Other Topics Concern    Not on  file   Social History Narrative    Lives alone. Retired from construction and various jobs, now retired. Would like to return to work PT to alleviate boredom.     Review of Systems   A comprehensive 14-point review of systems was reviewed with patient and was negative other than as specified above.     Objective:     Vitals:    09/08/20 1419   BP: (!) 148/99   Pulse: 86   Resp: 20   Temp: 97.8 °F (36.6 °C)        Physical Exam  Vitals signs and nursing note reviewed.   Constitutional:       General: He is not in acute distress.     Appearance: He is well-developed. He is not ill-appearing.   HENT:      Head: Normocephalic and atraumatic.      Nose: Nose normal. No rhinorrhea.   Eyes:      Conjunctiva/sclera: Conjunctivae normal.      Pupils: Pupils are equal, round, and reactive to light.   Neck:      Musculoskeletal: Normal range of motion and neck supple.   Cardiovascular:      Rate and Rhythm: Normal rate and regular rhythm.   Pulmonary:      Effort: Pulmonary effort is normal.      Breath sounds: Normal breath sounds.   Abdominal:      General: Bowel sounds are normal.      Palpations: Abdomen is soft.      Comments: Abdominal obesity   Musculoskeletal: Normal range of motion.         General: No deformity.      Comments: RLE BKA with prosthesis in place   Skin:     General: Skin is warm and dry.   Neurological:      General: No focal deficit present.      Mental Status: He is alert and oriented to person, place, and time. Mental status is at baseline.   Psychiatric:         Behavior: Behavior normal.         Thought Content: Thought content normal.         Judgment: Judgment normal.      Comments: Irritable, perhaps defensive, at first but appeared more relaxed and engaged through conversation.          Review of Symptoms    Symptom Assessment (ESAS 0-10 Scale)  Pain:  6  Dyspnea:  2  Anxiety:  8  Nausea:  0  Depression:  8  Anorexia:  0  Fatigue:  0  Insomnia:  0  Restlessness:  0  Agitation:  0     CAM /  Delirium:  Negative  Constipation:  Negative  Diarrhea:  Negative          ECOG Performance Status Grade:  3 - Confined to bed or chair 50% of waking hours    Living Arrangements:  Lives with friend    Advance Care Planning   Advance Directives:   Living Will: No    LaPOST: No    Do Not Resuscitate Status: No    Medical Power of : Yes    Agent's Name:  Niece, Kecia Williamson, ([phone])    Decision Making:  Patient answered questions           Assessment:       1. Anxiety about health    2. PAD (peripheral artery disease)    3. Moderate episode of recurrent major depressive disorder        Plan:           Problem List Items Addressed This Visit     None      Visit Diagnoses     Anxiety about health    -  Primary    Relevant Medications    sertraline (ZOLOFT) 25 MG tablet    PAD (peripheral artery disease)        Moderate episode of recurrent major depressive disorder        Relevant Medications    sertraline (ZOLOFT) 25 MG tablet    Other Relevant Orders    Ambulatory referral/consult to Psychiatry      Would consider addition of methadone due to renal function; I discussed this as a possibility with him. He will contact us following his next ortho appointment.     We will work with pulmonology to coordinate necessary equipment to treat his severe MARIA INES. I suspect chronic sleep deprivation is affecting his mood and overall quality of life.     We discussed the living will and reviewed a LaPost document. He wanted time to review it; I sent him home with a blank copy and we will revisit this next time.    I've referred him to psychiatry and started him on zoloft in the meantime. He has been on elavil for depression in the past with good results per patient but I'm concerned about oversedation potential.     Will see back in 4 weeks or sooner if requested by patient.    Total face to face time spent was 60 minutes with >50 % spent counseling regarding plan of care, advanced care planning, and options for  pain and symptom management. Additional 18 minutes spent in ACP planning.

## 2020-09-08 NOTE — Clinical Note
We've got to get this emmie help with pulmonology in getting his Bipap or Cpap based on his sleep study and titration study. Please reach out to them and be aggressive ;) I'm tempted to file an SOS because of the crack this man fell through. Could be deadly that he doesn't have the equipment he needs. File one if you think it's appropriate.   4 week follow up please.

## 2020-09-09 ENCOUNTER — TELEPHONE (OUTPATIENT)
Dept: PULMONOLOGY | Facility: CLINIC | Age: 65
End: 2020-09-09

## 2020-09-09 NOTE — TELEPHONE ENCOUNTER
----- Message from Kadie Manuel RN sent at 9/9/2020  2:49 PM CDT -----  Regarding: Patient BIPAP needed urgently  To: Dr. Caballero and staff:    Dr. Rasmussen has asked me to contact you to request that you reach out the patient as soon as possible regarding his sleep study results and recommendation for BIPAP.  His sleep study was ordered by Dr. Caballero.  The patient states that he has not heard from Pulmonology to set him up yet.  Please refer to Dr. Rasmussen's office note on 9/8/20.    Please let me know if you need any additional information.    Thank you,  Kadie Manuel RN  Palliative Care  [phone]

## 2020-09-09 NOTE — PATIENT INSTRUCTIONS
What Is Palliative Care?  Palliative care is a way to improve quality of life for someone who is being treated for a serious illness. To palliate means to ease the symptoms of an illness. Palliative care providers are experts in easing symptoms that cause distress. These may include pain, nausea, vomiting, anxiety, constipation, sleeping, and breathing problems. The people who are being treated and their loved ones are given emotional and spiritual support. Palliative care is given at the same time as traditional medical care. Active treatment for the illness does not stop.     Both the person receiving treatment and family members help direct the plan of care with the palliative care team.   Goals of palliative care  · Easing symptoms that cause distress. The main goal of palliative care is to ease symptoms. Symptoms may affect a persons ability to eat, be active, or spend time with others. Medicines and other methods are used. This gives the person a better quality of life while the illness is being treated.  · Coordinating care. This helps to make sure that each care provider is aware of the goals of care. Communication is done on a regular basis among all team members to make sure that the care goals are met.  · Meeting emotional and spiritual needs. The care team helps both the person being treated and family members cope with stress, depression, anxiety, and other issues. They can set up meetings with a counselor or  as desired.  · Giving information and helping with decisions. Care providers can help people and their families get the information they need. They can also help when care decisions need to be made.  · Helping create an advance care plan. This is a series of legal documents that note a persons wishes for their future healthcare. It helps to make sure that if people cant speak for themselves, their wishes can still be carried out. The documents vary by state.  Working with  your palliative care team  Palliative care is given by a team of people who focus on the physical, emotional, and psychosocial aspects of advanced illness. The team may include a palliative care provider or nurse, , pharmacist, dietitian, counselor, , and others. To get the most of palliative care, both the person and his or her loved ones have a role.  What a person who is receiving medical treatment can do  Tell your healthcare provider you are thinking about palliative care. Ask what palliative services are available in your area.  To ensure the best care, learn what you can about your illness and the goals of your care. If you are having pain and other symptoms due to a serious illness, ask your healthcare provider for a palliative care referral.  Treating these symptoms is best for your health and quality of life. If you need support in other ways, speak up. The care team is there to help you get what you need.  What a family member can do  Talk with the palliative care team often. Do your best to understand your loved ones illness and goals of care. When decisions need to be made, act on your loved ones wishes. And if you have a concern or question, speak up. You can help the team make sure that your loved one has the best quality of life possible.  Date Last Reviewed: 3/1/2017  © 9109-3695 [employer]. [address] [place] [address], [place] [address]. All rights reserved. This information is not intended as a substitute for professional medical care. Always follow your healthcare professional's instructions.

## 2020-09-16 RX ORDER — CLOPIDOGREL BISULFATE 75 MG/1
75 TABLET ORAL DAILY
Qty: 30 TABLET | Refills: 11 | Status: SHIPPED | OUTPATIENT
Start: 2020-09-16 | End: 2020-11-24 | Stop reason: SDUPTHER

## 2020-09-18 ENCOUNTER — PATIENT OUTREACH (OUTPATIENT)
Dept: ADMINISTRATIVE | Facility: OTHER | Age: 65
End: 2020-09-18

## 2020-09-18 NOTE — PROGRESS NOTES
Health Maintenance Due   Topic Date Due    TETANUS VACCINE  [birth year]25/1971    Shingles Vaccine (1 of 2) 07/25/2003    Colorectal Cancer Screening  01/18/2018    LDCT Lung Screen  11/08/2019    Lipid Panel  07/21/2020    Influenza Vaccine (1) 08/01/2020     Updates were requested from care everywhere.  Chart was reviewed for overdue Proactive [facility] Encounters (CHRISTINA) topics (CRS, Breast Cancer Screening, Eye exam)  Health Maintenance has been updated.  LINKS immunization registry triggered.  Immunizations were reconciled.

## 2020-09-21 ENCOUNTER — OFFICE VISIT (OUTPATIENT)
Dept: OPHTHALMOLOGY | Facility: CLINIC | Age: 65
End: 2020-09-21
Payer: MEDICARE

## 2020-09-21 DIAGNOSIS — H25.013 CORTICAL AGE-RELATED CATARACT OF BOTH EYES: ICD-10-CM

## 2020-09-21 DIAGNOSIS — E11.3553 STABLE PROLIFERATIVE DIABETIC RETINOPATHY OF BOTH EYES ASSOCIATED WITH TYPE 2 DIABETES MELLITUS: Primary | ICD-10-CM

## 2020-09-21 DIAGNOSIS — E11.36 DIABETIC CATARACT OF BOTH EYES: ICD-10-CM

## 2020-09-21 DIAGNOSIS — H52.203 ASTIGMATISM WITH PRESBYOPIA, BILATERAL: ICD-10-CM

## 2020-09-21 DIAGNOSIS — H52.4 ASTIGMATISM WITH PRESBYOPIA, BILATERAL: ICD-10-CM

## 2020-09-21 DIAGNOSIS — H25.13 NUCLEAR SCLEROSIS, BILATERAL: ICD-10-CM

## 2020-09-21 PROCEDURE — 92134 CPTRZ OPH DX IMG PST SGM RTA: CPT | Mod: HCNC,S$GLB,, | Performed by: OPTOMETRIST

## 2020-09-21 PROCEDURE — 92004 PR EYE EXAM, NEW PATIENT,COMPREHESV: ICD-10-PCS | Mod: HCNC,S$GLB,, | Performed by: OPTOMETRIST

## 2020-09-21 PROCEDURE — 92134 POSTERIOR SEGMENT OCT RETINA (OCULAR COHERENCE TOMOGRAPHY)-BOTH EYES: ICD-10-PCS | Mod: HCNC,S$GLB,, | Performed by: OPTOMETRIST

## 2020-09-21 PROCEDURE — 99999 PR PBB SHADOW E&M-EST. PATIENT-LVL III: CPT | Mod: PBBFAC,HCNC,, | Performed by: OPTOMETRIST

## 2020-09-21 PROCEDURE — 92004 COMPRE OPH EXAM NEW PT 1/>: CPT | Mod: HCNC,S$GLB,, | Performed by: OPTOMETRIST

## 2020-09-21 PROCEDURE — 99999 PR PBB SHADOW E&M-EST. PATIENT-LVL III: ICD-10-PCS | Mod: PBBFAC,HCNC,, | Performed by: OPTOMETRIST

## 2020-09-22 NOTE — PROGRESS NOTES
HPI     Diabetic Eye Exam     Comments: Yearly              Comments     NP to DNL  Last seen by [facility] on 3/24/17 for DM eye exam  Patient here today for yearly DM eye exam  Diabetic eye exam  Diagnosed with diabetes 15 years ago  Recent vision fluctuations No  Patient used to see Dr. Ramos and get injections  Has not seen Dr. Ramos in several years  Lab Results       Component                Value               Date                       HGBA1C                   9.1 (H)             06/26/2020            HPI    Any vision changes since last exam: No   Eye pain: No  Other ocular symptoms: No    Do you wear currently wear glasses or contacts? Glasses    Interested in contacts today? No    Do you plan on getting new glasses today? If needed                    Last edited by Mirtha Castaneda, PCT on 9/21/2020  2:22 PM. (History)              Assessment /Plan     For exam results, see Encounter Report.    Stable proliferative diabetic retinopathy of both eyes associated with type 2 diabetes mellitus  -     Posterior Segment OCT Retina-Both eyes  No macular edema on mOCT today OD, OS  Monitor 12 months    Nuclear sclerosis, bilateral  Cortical age-related cataract of both eyes  Diabetic cataract of both eyes  Cataract accounts for change in vision. Patient is at the option stage.   Cataract surgery will improve vision, but necessity is dependent on patient's symptoms.   Patient prefers to wait on surgery at this time. Pt to call back if symptoms worsen before next appointment.    Astigmatism with presbyopia, bilateral  Eyeglass Final Rx     Eyeglass Final Rx       Sphere Cylinder Axis Add    Right +0.50 +0.75 025 +2.50    Left +0.25 +1.00 165 +2.50    Expiration Date: 9/22/2021              RTC 1 yr for dilated eye exam with mOCT or PRN if any problems or vision changes.   Discussed above and answered questions.

## 2020-10-02 NOTE — PLAN OF CARE
07/23/18 1520   Medicare Message   Important Message from Medicare regarding Discharge Appeal Rights Given to patient/caregiver;Explained to patient/caregiver;Signed/date by patient/caregiver   Date IMM was signed 07/23/18   Time IMM was signed 1520      Patient made aware of 24/7 emergency services.

## 2020-10-07 ENCOUNTER — OFFICE VISIT (OUTPATIENT)
Dept: PULMONOLOGY | Facility: CLINIC | Age: 65
End: 2020-10-07
Payer: MEDICARE

## 2020-10-07 VITALS
WEIGHT: 224 LBS | OXYGEN SATURATION: 95 % | BODY MASS INDEX: 31.36 KG/M2 | SYSTOLIC BLOOD PRESSURE: 128 MMHG | HEART RATE: 74 BPM | DIASTOLIC BLOOD PRESSURE: 76 MMHG | HEIGHT: 71 IN | RESPIRATION RATE: 17 BRPM

## 2020-10-07 DIAGNOSIS — G47.31 CENTRAL SLEEP APNEA COMORBID WITH PRESCRIBED OPIOID USE: ICD-10-CM

## 2020-10-07 DIAGNOSIS — R09.02 EXERCISE HYPOXEMIA: ICD-10-CM

## 2020-10-07 DIAGNOSIS — G47.31 SEVERE CENTRAL SLEEP APNEA COMORBID WITH PRESCRIBED OPIOID USE: ICD-10-CM

## 2020-10-07 DIAGNOSIS — G47.33 OBSTRUCTIVE SLEEP APNEA: ICD-10-CM

## 2020-10-07 DIAGNOSIS — Z79.899 SEVERE CENTRAL SLEEP APNEA COMORBID WITH PRESCRIBED OPIOID USE: ICD-10-CM

## 2020-10-07 DIAGNOSIS — I50.9 CENTRAL SLEEP APNEA SECONDARY TO CONGESTIVE HEART FAILURE (CHF): ICD-10-CM

## 2020-10-07 DIAGNOSIS — R91.8 OPACITY OF LUNG ON IMAGING STUDY: ICD-10-CM

## 2020-10-07 DIAGNOSIS — G47.37 CENTRAL SLEEP APNEA SECONDARY TO CONGESTIVE HEART FAILURE (CHF): ICD-10-CM

## 2020-10-07 DIAGNOSIS — G47.33 OSA (OBSTRUCTIVE SLEEP APNEA): Primary | ICD-10-CM

## 2020-10-07 DIAGNOSIS — Z79.891 CENTRAL SLEEP APNEA COMORBID WITH PRESCRIBED OPIOID USE: ICD-10-CM

## 2020-10-07 PROCEDURE — 3008F PR BODY MASS INDEX (BMI) DOCUMENTED: ICD-10-PCS | Mod: HCNC,CPTII,S$GLB, | Performed by: INTERNAL MEDICINE

## 2020-10-07 PROCEDURE — 99499 RISK ADDL DX/OHS AUDIT: ICD-10-PCS | Mod: S$GLB,,, | Performed by: INTERNAL MEDICINE

## 2020-10-07 PROCEDURE — 3078F PR MOST RECENT DIASTOLIC BLOOD PRESSURE < 80 MM HG: ICD-10-PCS | Mod: HCNC,CPTII,S$GLB, | Performed by: INTERNAL MEDICINE

## 2020-10-07 PROCEDURE — 3074F SYST BP LT 130 MM HG: CPT | Mod: HCNC,CPTII,S$GLB, | Performed by: INTERNAL MEDICINE

## 2020-10-07 PROCEDURE — 3008F BODY MASS INDEX DOCD: CPT | Mod: HCNC,CPTII,S$GLB, | Performed by: INTERNAL MEDICINE

## 2020-10-07 PROCEDURE — 1159F PR MEDICATION LIST DOCUMENTED IN MEDICAL RECORD: ICD-10-PCS | Mod: HCNC,S$GLB,, | Performed by: INTERNAL MEDICINE

## 2020-10-07 PROCEDURE — 3078F DIAST BP <80 MM HG: CPT | Mod: HCNC,CPTII,S$GLB, | Performed by: INTERNAL MEDICINE

## 2020-10-07 PROCEDURE — 1101F PR PT FALLS ASSESS DOC 0-1 FALLS W/OUT INJ PAST YR: ICD-10-PCS | Mod: HCNC,CPTII,S$GLB, | Performed by: INTERNAL MEDICINE

## 2020-10-07 PROCEDURE — 1159F MED LIST DOCD IN RCRD: CPT | Mod: HCNC,S$GLB,, | Performed by: INTERNAL MEDICINE

## 2020-10-07 PROCEDURE — 3074F PR MOST RECENT SYSTOLIC BLOOD PRESSURE < 130 MM HG: ICD-10-PCS | Mod: HCNC,CPTII,S$GLB, | Performed by: INTERNAL MEDICINE

## 2020-10-07 PROCEDURE — 99215 PR OFFICE/OUTPT VISIT, EST, LEVL V, 40-54 MIN: ICD-10-PCS | Mod: 25,HCNC,S$GLB, | Performed by: INTERNAL MEDICINE

## 2020-10-07 PROCEDURE — 99999 PR PBB SHADOW E&M-EST. PATIENT-LVL III: CPT | Mod: PBBFAC,HCNC,, | Performed by: INTERNAL MEDICINE

## 2020-10-07 PROCEDURE — 99215 OFFICE O/P EST HI 40 MIN: CPT | Mod: 25,HCNC,S$GLB, | Performed by: INTERNAL MEDICINE

## 2020-10-07 PROCEDURE — 99999 PR PBB SHADOW E&M-EST. PATIENT-LVL III: ICD-10-PCS | Mod: PBBFAC,HCNC,, | Performed by: INTERNAL MEDICINE

## 2020-10-07 PROCEDURE — 99499 UNLISTED E&M SERVICE: CPT | Mod: S$GLB,,, | Performed by: INTERNAL MEDICINE

## 2020-10-07 PROCEDURE — 1101F PT FALLS ASSESS-DOCD LE1/YR: CPT | Mod: HCNC,CPTII,S$GLB, | Performed by: INTERNAL MEDICINE

## 2020-10-07 NOTE — PROGRESS NOTES
Subjective:     Patient ID: Lavelle Ladd is a 65 y.o. male.    Chief Complaint:      HPI     Dyspnea  65 y.o. male patient with a PMHx of DM, PVD, renal HTN, PAD, GERD, diastolic heart failure, CRI, CAD, CHF, DINORAH, COPD, and arthritis who presents for follow up evaluation of shortness of breath. Complains of shortness of breath. Symptoms occur while supine only, while getting dressed. Symptoms began years ago, gradually worsening since. Associated symptoms include  chest pain, located left chest, dry cough, dyspnea on exertion, dyspnea when laying down, shortness of breath and tightness in chest. He denies productive cough. He does not have had recent travel. Weight has been stable. Symptoms are exacerbated by any exercise. Symptoms are alleviated by rest.      Hx MI in 10/2019        Sleep Apnea  He presents for a sleep evaluation. He complains of snoring, periods of not breathing, decreased concentration, excessive daytime sleepiness, falling asleep while watching television, feels sleepy during the day, take naps during the day.  Symptoms began 2 years ago, gradually worsening since that time.  He goes to sleep at 10- 2 or 3 in the am weekdays and  weekends. He awakens 5 weekdays and 7 weekends. He falls asleep in 30 minutes.  Collar size na. He denies knees buckling with laughing, completely or partially paralyzed while falling asleep or waking up. Previous evaluation and treatment has included polysomnogram - BiLevel BiPAP recommended     COPD  He presents for evaluation and treatment of COPD. The patient is  not currently have symptoms / an exacerbation. The patient has COPD for approximately 10 years. Symptoms in previous episodes have included dyspnea, and typically last 2 weeks. Previous episodes have been exacerbated by moderate activity and yardwork. Current treatment includes albuterol inhaler, which generally provides some relief of symptoms.      He uses 1 pillows at night. Patient currently is not  on home oxygen therapy.. The patient is having no constitutional symptoms, denying fever, chills, anorexia, or weight loss. The patient has not been hospitalized for this condition before. He quit smoking approximately 7 years ago. The patient is experiencing exercise intolerance (difficulty walking 3 blocks on flat ground).     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  Toxic Fume Exposure: multiple times - pneumonia from chlorine         Past Medical History:   Diagnosis Date    DINORAH (acute kidney injury) 2016    Arthritis     CAD in native artery 2019    CHF (congestive heart failure)     Chronic obstructive pulmonary disease 2016    Coronary artery disease involving native coronary artery of native heart without angina pectoris 2016    CRI (chronic renal insufficiency) 2019     donor kidney transplant for DM 16     Induction with Thymo x3 and IV solumedrol to total 875mg  Kidney Biopsy  2016: 16 glomeruli, ACR type 1 AVR type 2, significant microcirculatory changes, c4d negative, No DSA, 5 to10% fibrosis. Treated with thymo x8 2016- no rejection      Diastolic heart failure     Encounter for blood transfusion     ESRD on RRT since 10/2013 10/29/2013    Biopsy proven diabetic nephropathy and lymphoplasmacytic interstitial infiltrate not c/w with AIN (ddx sjogrens or assoc with tamm-horsefall protein extravasation)     GERD (gastroesophageal reflux disease)     History of hepatitis C, s/p successful Rx w/ SVR12 - 2017    Completed 12 weeks harvoni w/ SVR    Hyperlipidemia     Hypertension     PAD (peripheral artery disease) 2019    PIC line (peripherally inserted central catheter) flush      Prophylactic immunotherapy     Proteinuria     PVD (peripheral vascular disease) 6/26/2017    RLE BKA CT 12/11/16 Extensive atherosclerotic disease of the aorta and mesenteric arteries.     Renal hypertension     Type 2 diabetes mellitus with diabetic neuropathy, with long-term current use of insulin 12/1/2016    Vitamin B12 deficiency      Past Surgical History:   Procedure Laterality Date    AORTOGRAPHY WITH SERIALOGRAPHY N/A 6/14/2018    Procedure: LEFT LEG ANGIOGRAM;  Surgeon: Donal Mcdonald MD;  Location: [facility] CATH LAB;  Service: Vascular;  Laterality: N/A;    av bovine graft      Left UE    AV FISTULA PLACEMENT      left UE    CARDIAC CATHETERIZATION  02/2015    CLOSURE OF WOUND Left 9/24/2018    Procedure: CLOSURE, WOUND;  Surgeon: Karla Wheeler DPM;  Location: [facility] OR;  Service: Podiatry;  Laterality: Left;  Secondary Wound closure, extensive    CLOSURE OF WOUND Left 11/5/2018    Procedure: CLOSURE, WOUND;  Surgeon: Karla Wheeler DPM;  Location: [facility] OR;  Service: Podiatry;  Laterality: Left;    DEBRIDEMENT OF MULTIPLE METATARSAL BONES Left 11/5/2018    Procedure: DEBRIDEMENT, METATARSAL BONE, 2 OR MORE BONES;  Surgeon: Karla Wheeler DPM;  Location: [facility] OR;  Service: Podiatry;  Laterality: Left;    EXCISION OF SKIN Left 9/27/2019    Procedure: EXCISION, SKIN;  Surgeon: Lenard Alarcon MD;  Location: [address];  Service: Plastics;  Laterality: Left;  Plastics set, NIMS monitor, ACell    FOOT AMPUTATION THROUGH METATARSAL Left 9/21/2018    Procedure: AMPUTATION, FOOT, TRANSMETATARSAL;  Surgeon: Karla Wheeler DPM;  Location: [facility] OR;  Service: Podiatry;  Laterality: Left;    FOOT AMPUTATION THROUGH METATARSAL Left 10/31/2018    Procedure: AMPUTATION, FOOT, TRANSMETATARSAL;  Surgeon: Karla Wheeler DPM;  Location: [facility] OR;  Service: Podiatry;  Laterality: Left;    FOOT AMPUTATION THROUGH METATARSAL Left 11/5/2018    Procedure: AMPUTATION, FOOT, TRANSMETATARSAL;   Surgeon: Karla Wheeler DPM;  Location: [facility] OR;  Service: Podiatry;  Laterality: Left;  revisional transmetatarsal amputation, Left foot    IRRIGATION AND DEBRIDEMENT OF LOWER EXTREMITY Left 10/31/2018    Procedure: IRRIGATION AND DEBRIDEMENT, LOWER EXTREMITY;  Surgeon: Karla Wheeler DPM;  Location: [facility] OR;  Service: Podiatry;  Laterality: Left;    KIDNEY TRANSPLANT  05/21/2016    LEFT HEART CATHETERIZATION Left 7/21/2019    Procedure: CATHETERIZATION, HEART, LEFT;  Surgeon: Andrew Valdez MD;  Location: [facility] CATH LAB;  Service: Cardiology;  Laterality: Left;    LEG AMPUTATION THROUGH KNEE  2011    right LE, started as nail puncture leading to diabetic ulcer    SKIN FULL THICKNESS GRAFT Left 10/7/2019    Procedure: APPLICATION, GRAFT, SKIN, FULL-THICKNESS;  Surgeon: Lenard Alarcon MD;  [facility];  Service: Plastics;  Laterality: Left;    SURGICAL REMOVAL OF LESION OF FACE Right 10/7/2019    Procedure: EXCISION, LESION, FACE;  Surgeon: Lenard Alarcon MD;  [facility];  Service: Plastics;  Laterality: Right;     Review of patient's allergies indicates:  No Known Allergies  Current Outpatient Medications on File Prior to Visit   Medication Sig Dispense Refill    amLODIPine (NORVASC) 10 MG tablet Take 1 tablet (10 mg total) by mouth once daily. 90 tablet 1    aspirin (ECOTRIN) 81 MG EC tablet Take 1 tablet (81 mg total) by mouth once daily. 90 tablet 1    atorvastatin (LIPITOR) 80 MG tablet Take 1 tablet (80 mg total) by mouth once daily. 90 tablet 1    BD INSULIN SYRINGE ULTRA-FINE 1 mL 31 gauge x 5/16 Syrg USE ONE AS DIRECTED FOUR TIMES DAILY WITH MEALS AND AT BEDTIME 120 each 10    BELBUCA 600 mcg Film       blood sugar diagnostic Strp 1 each by Misc.(Non-Drug; Combo Route) route 3 (three) times daily. 100 each 11    blood-glucose meter kit Use as instructed 1 each 0    blood-glucose meter,continuous (DEXCOM G6 ) Misc Check sugars at least 3 times a  day 1 each 0    blood-glucose sensor (DEXCOM G6 SENSOR) [place]ana Check sugars 3 times a day 1 Device 0    cloNIDine (CATAPRES) 0.1 MG tablet Take 1 tablet (0.1 mg total) by mouth 3 (three) times daily. 30 tablet 0    clopidogreL (PLAVIX) 75 mg tablet Take 1 tablet (75 mg total) by mouth once daily. 30 tablet 11    diclofenac (FLECTOR) 1.3 % PT12 Apply 1 packet topically once daily.      ergocalciferol (ERGOCALCIFEROL) 50,000 unit Cap Take 1 capsule (50,000 Units total) by mouth every 7 days. Take on Mondays 4 capsule 11    flash glucose scanning reader (FREESTYLE BRYAN 14 DAY READER) Misc 1 Device by Misc.(Non-Drug; Combo Route) route as needed. 1 each 0    flash glucose sensor (FREESTYLE BRYAN 14 DAY SENSOR) Kit 1 kit by Misc.(Non-Drug; Combo Route) route every 14 (fourteen) days. 2 kit 11    FLUAD 7330-6257, 65 YR UP,,PF, 45 mcg (15 mcg x 3)/0.5 mL Syrg       fluorouracil (EFUDEX) 5 % cream Apply topically 2 (two) times daily. For 3 weeks. Will cause redness and irritation. 40 g 2    furosemide (LASIX) 40 MG tablet Take 1 tablet (40 mg total) by mouth daily as needed (Leg swelling, Nocturnal SOB). 30 tablet 11    gabapentin (NEURONTIN) 300 MG capsule       HYDROcodone-acetaminophen (NORCO)  mg per tablet 1 tablet by Per G Tube route every 6 (six) hours as needed for Pain. 15 tablet 0    hydrocortisone 2.5 % cream Apply topically 2 (two) times daily. 30 g 1    hydrOXYzine HCL (ATARAX) 25 MG tablet TAKE ONE TABLET BY MOUTH THREE TIMES DAILY AS NEEDED FOR ITCHING  45 tablet 0    hydrOXYzine pamoate (VISTARIL) 25 MG Cap Take 1 capsule (25 mg total) by mouth every 8 (eight) hours as needed (anxiety). 20 capsule 0    insulin aspart U-100 (NOVOLOG) 100 unit/mL (3 mL) InPn pen Inject 5 units three times a day with meal if BG > 300. 6 mL 11    ipratropium (ATROVENT) 0.02 % nebulizer solution Take 2.5 mLs (500 mcg total) by nebulization 4 (four) times daily. 120 vial 11    isosorbide mononitrate (IMDUR)  "30 MG 24 hr tablet Take 2 tablets (60 mg total) by mouth once daily. 60 tablet 11    ketoconazole (NIZORAL) 2 % cream Apply topically 2 (two) times daily. 60 g 1    levalbuterol (XOPENEX) 1.25 mg/3 mL nebulizer solution Take 3 mLs (1.25 mg total) by nebulization every 6 to 8 hours as needed for Wheezing or Shortness of Breath. 288 mL 11    linaCLOtide (LINZESS) 72 mcg Cap capsule Take 1 capsule (72 mcg total) by mouth before breakfast. 30 capsule 0    methylphenidate HCl (RITALIN) 10 MG tablet TAKE ONE TABLET BY MOUTH TWICE A DAY WITH MEALS 60 tablet 0    metoprolol tartrate (LOPRESSOR) 25 MG tablet Take 1 tablet (25 mg total) by mouth 2 (two) times daily. 60 tablet 11    mupirocin (BACTROBAN) 2 % ointment Apply topically 3 (three) times daily. 30 g 1    mupirocin (BACTROBAN) 2 % ointment Apply topically once daily. With dressing changes 22 g 1    mupirocin calcium 2% (BACTROBAN) 2 % cream Apply topically 3 (three) times daily. 30 g 1    mycophenolate (CELLCEPT) 250 mg Cap Take 1 capsule (250 mg total) by mouth 2 (two) times daily. 60 capsule 11    naloxone (NARCAN) 4 mg/actuation Spry 1 spray by Nasal route once.      nitroGLYCERIN (NITROSTAT) 0.4 MG SL tablet Place 1 tablet (0.4 mg total) under the tongue every 5 (five) minutes as needed. 30 tablet 0    ondansetron (ZOFRAN-ODT) 4 MG TbDL Take 1 tablet (4 mg total) by mouth every 8 (eight) hours as needed. 21 tablet 1    oxyCODONE-acetaminophen (PERCOCET)  mg per tablet       OZEMPIC 0.25 mg or 0.5 mg(2 mg/1.5 mL) PnIj INJECT 0.5 MG INTO THE SKIN EVERY 7 DAYS.  2 mL 11    pen needle, diabetic 32 gauge x 5/32" Ndle 1 each by Misc.(Non-Drug; Combo Route) route 4 (four) times daily. Urszula Needles 200 each 11    predniSONE (DELTASONE) 5 MG tablet Take 1 tablet (5 mg total) by mouth once daily. 30 tablet 11    senna-docusate 8.6-50 mg (PERICOLACE) 8.6-50 mg per tablet Take 2 tablets by mouth once daily. 60 tablet 3    sertraline (ZOLOFT) 25 MG " tablet Take 1 tablet (25 mg total) by mouth once daily. For depression and anxiety 30 tablet 11    sevelamer carbonate (RENVELA) 800 mg Tab       tacrolimus (PROGRAF) 0.5 MG Cap Take 2 capsules (1 mg total) by mouth every 12 (twelve) hours. 180 capsule 11    torsemide (DEMADEX) 20 MG Tab Take 2 tablets (40 mg total) by mouth once daily. 60 tablet 11    TRESIBA FLEXTOUCH U-100 100 unit/mL (3 mL) InPn Inject 30 Units into the skin once daily. 9 mL 11    VIOS AEROSOL DELIVERY SYSTEM [place] USE Q 4 H PRN      cephALEXin (KEFLEX) 500 MG capsule        No current facility-administered medications on file prior to visit.      Social History     Socioeconomic History    Marital status: Single     Spouse name: Not on file    Number of children: Not on file    Years of education: Not on file    Highest education level: Not on file   Occupational History    Occupation: Disabled     Employer: DISABLED   Social Needs    Financial resource strain: Not on file    Food insecurity     Worry: Not on file     Inability: Not on file    Transportation needs     Medical: Not on file     Non-medical: Not on file   Tobacco Use    Smoking status: Former Smoker     Packs/day: 1.00     Years: 40.00     Pack years: 40.00     Quit date: 2013     Years since quittin.7    Smokeless tobacco: Never Used   Substance and Sexual Activity    Alcohol use: Yes     Alcohol/week: 6.0 standard drinks     Types: 6 Cans of beer per week     Comment: seldom    Drug use: No    Sexual activity: Never   Lifestyle    Physical activity     Days per week: Not on file     Minutes per session: Not on file    Stress: Not on file   Relationships    Social connections     Talks on phone: Not on file     Gets together: Not on file     Attends Yarsanism service: Not on file     Active member of club or organization: Not on file     Attends meetings of clubs or organizations: Not on file     Relationship status: Not on file   Other Topics  "Concern    Not on file   Social History Narrative    Lives alone. Retired from construction and various jobs, now retired. Would like to return to work PT to alleviate boredom.     Family History   Problem Relation Age of Onset    Cancer Father     Diabetes Father     Heart failure Father     Stroke Father     Heart failure Mother     Kidney disease Neg Hx        Review of Systems   Constitutional: Positive for fatigue. Negative for fever.   HENT: Negative.    Eyes: Negative for redness and itching.   Respiratory: Positive for apnea, cough, sputum production, shortness of breath, dyspnea on extertion, use of rescue inhaler and Paroxysmal Nocturnal Dyspnea.    Cardiovascular: Negative.  Negative for chest pain, palpitations and leg swelling.   Genitourinary: Negative.  Negative for difficulty urinating and hematuria.   Endocrine: endocrine negative Negative for cold intolerance and heat intolerance.    Musculoskeletal: Positive for arthralgias and joint swelling.   Skin: Negative.  Negative for rash.   Gastrointestinal: Negative.  Negative for nausea and abdominal pain.   Neurological: Negative.  Negative for dizziness, syncope, weakness and light-headedness.   Hematological: Negative for adenopathy. Does not bruise/bleed easily.   Psychiatric/Behavioral: Positive for sleep disturbance. The patient is not nervous/anxious.        Objective:      /76   Pulse 74   Resp 17   Ht 5' 11" (1.803 m)   Wt 101.6 kg (224 lb)   SpO2 95%   BMI 31.24 kg/m²   Physical Exam  Vitals signs and nursing note reviewed.   Constitutional:       Appearance: He is well-developed.      Comments: Chronically ill appearing in a scooter     HENT:      Head: Normocephalic and atraumatic.      Mouth/Throat:      Pharynx: No oropharyngeal exudate.   Eyes:      Conjunctiva/sclera: Conjunctivae normal.      Pupils: Pupils are equal, round, and reactive to light.   Neck:      Musculoskeletal: Neck supple.      Thyroid: No " thyromegaly.      Vascular: No JVD.      Trachea: No tracheal deviation.   Cardiovascular:      Rate and Rhythm: Normal rate and regular rhythm.      Heart sounds: Normal heart sounds. No murmur.   Pulmonary:      Effort: Pulmonary effort is normal. No respiratory distress.      Breath sounds: Examination of the right-lower field reveals rales. Examination of the left-lower field reveals rales. Decreased breath sounds and rales present. No wheezing or rhonchi.   Chest:      Chest wall: No tenderness.   Abdominal:      General: Bowel sounds are normal.      Palpations: Abdomen is soft.   Musculoskeletal:         General: Deformity present. No tenderness.      Left lower leg: Edema present.      Comments: Right leg AKA    Lymphadenopathy:      Cervical: No cervical adenopathy.   Skin:     General: Skin is warm and dry.   Neurological:      Mental Status: He is alert and oriented to person, place, and time.       Personal Diagnostic Review  Chest x-ray: stable  Radiology Result      Name:   :  Patient MRN:   Lavelle Ladd [id number]   Account Number: Room & Bed Accession Number:   [id number]   [id number]   Authorizing Physician: Patient Class: Diagnosis:   Avel LINDSEYShayna Estrada OP- Outpatient Diagnostic Testing Shortness of breath [R06.02 (ICD-10-CM)]   Procedure:  Exam Date: Reason for Exam:   X-Ray Chest PA And Lateral 2020 None Specified             RESULTS:     EXAMINATION:  XR CHEST PA AND LATERAL     CLINICAL HISTORY:  Shortness of breath     TECHNIQUE:  PA and lateral views of the chest were performed.     COMPARISON:  Chest x-ray from 2020     FINDINGS:  No focal consolidation or effusion.  Mild scarring or atelectasis   suspected at the left lung base.  Aortic atherosclerosis.    Cardiomediastinal silhouette is stable in size and configuration.  Mild   thoracic spondylosis.     Impression:     Stable chest.  No active disease.        Electronically signed by:     Juve Pace  MD  Date:                                    08/12/2020  Time:                                   14:45                     Signed By:  Juve Pace MD on 8/12/2020  2:45 PM          Posterior Segment OCT Retina-Both eyes  Right Eye  Quality was good. Scan locations included subfoveal, juxtafoveal. Findings   include normal foveal contour.     Left Eye  Quality was good. Scan locations included subfoveal, juxtafoveal. Findings   include normal foveal contour.     Notes  No macula edema OD, OS      Office Spirometry Results:     No flowsheet data found.  Pulmonary Studies Review 10/7/2020   SpO2 95   Height 71   Weight 3584   BMI (Calculated) 31.3   Predicted Distance 346.43   Predicted Distance Meters (Calculated) 540.7         Assessment:            MARIA INES (obstructive sleep apnea)  -     BIPAP FOR HOME USE    Severe central sleep apnea comorbid with prescribed opioid use  -     BIPAP FOR HOME USE    Central sleep apnea secondary to congestive heart failure (CHF)  -     BIPAP FOR HOME USE    Exercise hypoxemia  -     Six Minute Walk Test to qualify for Home Oxygen; Future    Opacity of lung on imaging study    Obstructive sleep apnea    Central sleep apnea comorbid with prescribed opioid use          Outpatient Encounter Medications as of 10/7/2020   Medication Sig Dispense Refill    amLODIPine (NORVASC) 10 MG tablet Take 1 tablet (10 mg total) by mouth once daily. 90 tablet 1    aspirin (ECOTRIN) 81 MG EC tablet Take 1 tablet (81 mg total) by mouth once daily. 90 tablet 1    atorvastatin (LIPITOR) 80 MG tablet Take 1 tablet (80 mg total) by mouth once daily. 90 tablet 1    BD INSULIN SYRINGE ULTRA-FINE 1 mL 31 gauge x 5/16 Syrg USE ONE AS DIRECTED FOUR TIMES DAILY WITH MEALS AND AT BEDTIME 120 each 10    BELBUCA 600 mcg Film       blood sugar diagnostic Strp 1 each by Misc.(Non-Drug; Combo Route) route 3 (three) times daily. 100 each 11    blood-glucose meter kit Use as instructed 1 each 0    blood-glucose  meter,continuous (DEXCOM G6 ) Misc Check sugars at least 3 times a day 1 each 0    blood-glucose sensor (DEXCOM G6 SENSOR) [place] Check sugars 3 times a day 1 Device 0    cloNIDine (CATAPRES) 0.1 MG tablet Take 1 tablet (0.1 mg total) by mouth 3 (three) times daily. 30 tablet 0    clopidogreL (PLAVIX) 75 mg tablet Take 1 tablet (75 mg total) by mouth once daily. 30 tablet 11    diclofenac (FLECTOR) 1.3 % PT12 Apply 1 packet topically once daily.      ergocalciferol (ERGOCALCIFEROL) 50,000 unit Cap Take 1 capsule (50,000 Units total) by mouth every 7 days. Take on Mondays 4 capsule 11    flash glucose scanning reader (FREESTYLE BRYAN 14 DAY READER) Misc 1 Device by Misc.(Non-Drug; Combo Route) route as needed. 1 each 0    flash glucose sensor (FREESTYLE BRYAN 14 DAY SENSOR) Kit 1 kit by Misc.(Non-Drug; Combo Route) route every 14 (fourteen) days. 2 kit 11    FLUAD 1003-2055, 65 YR UP,,PF, 45 mcg (15 mcg x 3)/0.5 mL Syrg       fluorouracil (EFUDEX) 5 % cream Apply topically 2 (two) times daily. For 3 weeks. Will cause redness and irritation. 40 g 2    furosemide (LASIX) 40 MG tablet Take 1 tablet (40 mg total) by mouth daily as needed (Leg swelling, Nocturnal SOB). 30 tablet 11    gabapentin (NEURONTIN) 300 MG capsule       HYDROcodone-acetaminophen (NORCO)  mg per tablet 1 tablet by Per G Tube route every 6 (six) hours as needed for Pain. 15 tablet 0    hydrocortisone 2.5 % cream Apply topically 2 (two) times daily. 30 g 1    hydrOXYzine HCL (ATARAX) 25 MG tablet TAKE ONE TABLET BY MOUTH THREE TIMES DAILY AS NEEDED FOR ITCHING  45 tablet 0    hydrOXYzine pamoate (VISTARIL) 25 MG Cap Take 1 capsule (25 mg total) by mouth every 8 (eight) hours as needed (anxiety). 20 capsule 0    insulin aspart U-100 (NOVOLOG) 100 unit/mL (3 mL) InPn pen Inject 5 units three times a day with meal if BG > 300. 6 mL 11    ipratropium (ATROVENT) 0.02 % nebulizer solution Take 2.5 mLs (500 mcg total) by  "nebulization 4 (four) times daily. 120 vial 11    isosorbide mononitrate (IMDUR) 30 MG 24 hr tablet Take 2 tablets (60 mg total) by mouth once daily. 60 tablet 11    ketoconazole (NIZORAL) 2 % cream Apply topically 2 (two) times daily. 60 g 1    levalbuterol (XOPENEX) 1.25 mg/3 mL nebulizer solution Take 3 mLs (1.25 mg total) by nebulization every 6 to 8 hours as needed for Wheezing or Shortness of Breath. 288 mL 11    linaCLOtide (LINZESS) 72 mcg Cap capsule Take 1 capsule (72 mcg total) by mouth before breakfast. 30 capsule 0    methylphenidate HCl (RITALIN) 10 MG tablet TAKE ONE TABLET BY MOUTH TWICE A DAY WITH MEALS 60 tablet 0    metoprolol tartrate (LOPRESSOR) 25 MG tablet Take 1 tablet (25 mg total) by mouth 2 (two) times daily. 60 tablet 11    mupirocin (BACTROBAN) 2 % ointment Apply topically 3 (three) times daily. 30 g 1    mupirocin (BACTROBAN) 2 % ointment Apply topically once daily. With dressing changes 22 g 1    mupirocin calcium 2% (BACTROBAN) 2 % cream Apply topically 3 (three) times daily. 30 g 1    mycophenolate (CELLCEPT) 250 mg Cap Take 1 capsule (250 mg total) by mouth 2 (two) times daily. 60 capsule 11    naloxone (NARCAN) 4 mg/actuation Spry 1 spray by Nasal route once.      nitroGLYCERIN (NITROSTAT) 0.4 MG SL tablet Place 1 tablet (0.4 mg total) under the tongue every 5 (five) minutes as needed. 30 tablet 0    ondansetron (ZOFRAN-ODT) 4 MG TbDL Take 1 tablet (4 mg total) by mouth every 8 (eight) hours as needed. 21 tablet 1    oxyCODONE-acetaminophen (PERCOCET)  mg per tablet       OZEMPIC 0.25 mg or 0.5 mg(2 mg/1.5 mL) PnIj INJECT 0.5 MG INTO THE SKIN EVERY 7 DAYS.  2 mL 11    pen needle, diabetic 32 gauge x 5/32" Ndle 1 each by Misc.(Non-Drug; Combo Route) route 4 (four) times daily. Urszula Needles 200 each 11    predniSONE (DELTASONE) 5 MG tablet Take 1 tablet (5 mg total) by mouth once daily. 30 tablet 11    senna-docusate 8.6-50 mg (PERICOLACE) 8.6-50 mg per tablet " Take 2 tablets by mouth once daily. 60 tablet 3    sertraline (ZOLOFT) 25 MG tablet Take 1 tablet (25 mg total) by mouth once daily. For depression and anxiety 30 tablet 11    sevelamer carbonate (RENVELA) 800 mg Tab       tacrolimus (PROGRAF) 0.5 MG Cap Take 2 capsules (1 mg total) by mouth every 12 (twelve) hours. 180 capsule 11    torsemide (DEMADEX) 20 MG Tab Take 2 tablets (40 mg total) by mouth once daily. 60 tablet 11    TRESIBA FLEXTOUCH U-100 100 unit/mL (3 mL) InPn Inject 30 Units into the skin once daily. 9 mL 11    VIOS AEROSOL DELIVERY SYSTEM [place] USE Q 4 H PRN      cephALEXin (KEFLEX) 500 MG capsule        No facility-administered encounter medications on file as of 10/7/2020.      Plan:       Requested Prescriptions      No prescriptions requested or ordered in this encounter     Problem List Items Addressed This Visit     Central sleep apnea comorbid with prescribed opioid use    Central sleep apnea secondary to congestive heart failure (CHF)    Overview     Most recent sleep study did not show evidence of Cheyne-Solomon respirations.  However the patient did have central sleep apnea which was thought to be due to his opioid use         Relevant Orders    BIPAP FOR HOME USE    Opacity of lung on imaging study    MARIA INES (obstructive sleep apnea) - Primary    Relevant Orders    BIPAP FOR HOME USE    Severe central sleep apnea comorbid with prescribed opioid use    Relevant Orders    BIPAP FOR HOME USE      Other Visit Diagnoses     Exercise hypoxemia        Relevant Orders    Six Minute Walk Test to qualify for Home Oxygen             Follow up in about 6 weeks (around 11/18/2020) for CPAP download - review on day of visit, 6 min walk - on return.    MEDICAL DECISION MAKING: Moderate to high complexity.  Overall, the multiple problems listed are of moderate to high severity that may impact quality of life and activities of daily living. Side effects of medications, treatment plan as well as options  and alternatives reviewed and discussed with patient. There was counseling of patient concerning these issues.    Total time spent in face to face counseling and coordination of care - 40  minutes over 50% of time was used in discussion of prognosis, risks, benefits of treatment, instructions and compliance with regimen . Discussion with other physicians or health care providers (DME, NP, pharmacy, respiratory therapy) occurred.

## 2020-10-07 NOTE — PATIENT INSTRUCTIONS
CPAP HABITUATION PROCEDURE    Jose David Gutierrez, Ph.D., [facility] and Colin Garcia M.D.  [facility], [facility]    Some people have difficulty adjusting to CPAP/BiPAP/AutoCPAP.  This is not unusual or hard to understand: Breathing with CPAP is different from ordinary breathing, and this difference is aversive to some. The problem can be overcome, however, and the benefits CPAP confers are certainly worth the effort.  Below, you will find a simple and gradual way to get used to CPAP before you try to use it all night, every night.  The essence of this procedure is to relax and let breathing with CPAP become a habit.  It may take about 2 weeks, and involves the followin. CPAP while awake and comfortably seated, during the late evening.     2. CPAP in bed while attempting sleep at night.     3. If your discomfort is too great at any time, discontinue and attempt again later the same night, for the same amount of time.   4. You and your physician may alter the times and pressures if necessary.     5. If you find that it is very easy to get used to CPAP, you may start using it every night when you are comfortable enough to do so.  6. IMPORTANT REMINDER: If you have a cold or sinus congestion it is okay to miss a night or two of CPAP. Consider using antihistamines or decongestants to clear up your sinus congestion prior to sleeping.    DAYS  1-3   Start CPAP while awake and comfortably seated during the late evening, after having prepared for bed.  You may do this while watching television, listening to music or reading. Use for 1 hour, then take off CPAP and go directly to bed to sleep    DAYS  4-6   Start CPAP when you go to bed and use for 1 hour, or until you fall asleep.  If your discomfort is too great at any time, discontinue and attempt again later the same night, for the same designated amount of time (1 hour).     DAYS  7-9   Increase time with CPAP to 2 hours a  "night.  If your discomfort is too great at any time, discontinue and attempt again later the same night, for the same designated amount of time (2 hours).     DAYS 10-12  Increase time with CPAP to 3 hours a night. If your discomfort is too great at any time, discontinue and attempt again later the same night, for the same designated amount of time (3 hours).     DAYS 13-15   Sleep the entire night with CPAP.     OPTIONAL: You may use Progressive Muscle Relaxation (PMR) to help put you at ease when using CPAP; do PMR twice each day, once in the morning or afternoon, and once in the evening just before using CPAP. You may do PMR prior to any attempt until you are comfortable with CPAP.    Continuous Positive Airway Pressure Machine Cleaning    One of the most important factors in maintaining CPAP compliance is taking proper care of your CPAP equipment. In order to have successful CPAP therapy, you must be willing to make your treatment a priority in your life, and that means regularly cleaning and maintaining your CPAP equipment. Fortunately, taking proper care of your equipment is pretty easy, and not very time consuming. With a little adjustment to your regular morning routine, your device and accessories will be working at 100% efficiency to get you that much needed sleep you've been longing for.    One of the most frequent questions we get asked is "how often do I need to clean my CPAP equipment?" .    CPAP Humidifier Cleaning and Replacement:  Nearly all current CPAP machines now come with a heated humidification system that helps cut down on morning dry mouth as well as keeping your nasal turbinates from drying out and becoming irritated and inflamed. However, the humidification chamber needs to be cleaned out daily to prevent bacteria build-up as well as calcification. Recommended routine:    Remove chamber from humidifier carefully so water doesn't enter your CPAP machine.    Open chamber and wash with warm, " soapy water.    Rinse well with water and allow to dry on a clean cloth or paper towel out in direct sunlight.    Fill with distilled or sterile water (obtained at any grocery store) Do not use tap water as it may contain minerals and chemicals that can damage components of the machine. It is also not recommended to use filtered water (i.e. through a [place] filter) for the same reasons.    Once a week the humidifier chamber should be soaked in a solution of 1 part white vinegar 3 parts water for approximately 15-20 minutes before rinsing thoroughly with distilled water.    Some humidifier chambers are  safe, but make sure to check your CPAP machine's manual before cleaning in a .    Humidifier chambers should be replaced as needed.    CPAP Mask Cleaning and Replacement:  Most CPAP mask cushions are made of silicone, a gentle, non-irritating material. However, while silicone is a very comfortable material for masks, it doesn't have a very long lifespan, and without proper care can breakdown quicker than expected. The oil from your skin interacts with the silicone mask and causes breakdown and stiffness. Therefore, cleaning your CPAP mask is crucial in making it efficient as possible. Here's some tips on CPAP mask cleaning and replacement:    Wash mask daily with warm water and mild, non-fragrant soap or purchase CPAP mask specific wipes and detergents. You can use the same type of mild basic facial soap that you use on your face.    Rinse with water and allow to air dry on a clean cloth or paper towel out of direct sunlight.    Before using mask at night, wash your face thoroughly and don't use facial moisturizers. Facial oils and moisturizers can breakdown the silicone faster.    Once a week soak mask in solution of 1 part white vinegar 3 parts water before rinsing in distilled water.    Headgear and chinstraps should be washed as needed by hand using warm soapy water, rinsed well, and air dried.  Do not place headgear or chinstraps in washing machine or dryer.  For replacement schedules of CPAP masks you should check both your 's recommendations and your insurance allowance. However, for most masks it is recommended that you  replace the cushions 1-2 times per month, and the mask every 3-6 months.    CPAP tubing should be cleaned weekly in a sink of warm, soapy water, rinsed well, and left to hang-dry out of direct sunlight.    CPAP Filters Cleaning and Replacement  Your filters are located near the back of the CPAP machine where the device draws air from the room that it compresses to your pressure settings. Nearly all CPAP machines have a disposable white paper filter and some have an additional non-disposable grey filter as well. Here are some cleaning tips for your CPAP filters:    You should clean the grey non-disposable filter at least on a weekly basis. You may have to clean it more regularly if you have pets, smoke inside your house, or if your home is especially [place].    Rinse grey filters with water and allow to dry before placing back into your machine.    The grey re-usable filters should be replaced when it begins to look worn or after 6 months.    Replace disposable white paper filters monthly or more frequently if it appears dingy or dirty.    Your CPAP machine itself does not need to be cleaned but you may want to dust it down with a slightly damp cloth as desired.    General CPAP Maintenance & CPAP Cleaning Tips  Make your CPAP equipment cleaning part of your morning routine, allowing the equipment ample time to dry during the day.    Keep machine and accessories out of direct sunlight to avoid damaging them.    Never use bleach to clean accessories.    Other machine accessories such as power cords and data cards may need to be replaced due to equipment malfunctions.    Place machine on a level surface away from objects such as curtains that may interfere with the air  intake.    Always use distilled or sterile water when cleaning components.    Keep track of when you should order replacement parts for your mask and accessories so that you always get the most out of your therapy.    With these simple tips on cleaning and maintaining your CPAP device and accessories, you will assuredly have a much better CPAP therapy experience.    There are a number of machines advertised that clean Continuous Positive Airway Pressure equipment. For most patients this is not necessary. If you have a history of chronic sinus or lung infections this type of cleaning device may be helpful. Unfortunately, at this time most insurance companies and Medicare are not paying for these devices.

## 2020-11-16 ENCOUNTER — TELEPHONE (OUTPATIENT)
Dept: PULMONOLOGY | Facility: CLINIC | Age: 65
End: 2020-11-16

## 2020-11-16 DIAGNOSIS — G47.31 SEVERE CENTRAL SLEEP APNEA COMORBID WITH PRESCRIBED OPIOID USE: ICD-10-CM

## 2020-11-16 DIAGNOSIS — G47.33 OSA (OBSTRUCTIVE SLEEP APNEA): ICD-10-CM

## 2020-11-16 DIAGNOSIS — G47.37 CENTRAL SLEEP APNEA SECONDARY TO CONGESTIVE HEART FAILURE (CHF): Primary | ICD-10-CM

## 2020-11-16 DIAGNOSIS — I50.9 CENTRAL SLEEP APNEA SECONDARY TO CONGESTIVE HEART FAILURE (CHF): Primary | ICD-10-CM

## 2020-11-16 DIAGNOSIS — Z79.899 SEVERE CENTRAL SLEEP APNEA COMORBID WITH PRESCRIBED OPIOID USE: ICD-10-CM

## 2020-11-17 NOTE — TELEPHONE ENCOUNTER
"  Patient had INITIAL sleep study on 1/18/2020: Showed severe mixed Central Apnea and Obstructive Sleep Apnea with AHI of 35.    I previously ordered a Continuous Positive Airway Pressure titration polysomnogram. This was not performed. Instead a BiPAP titration was performed.  Please perform a Continuous Positive Airway Pressure titration on this patient with combined Obstructive Sleep Apnea and Central sleep apnea.    Suggest protocol from Up to Date.    In a sleep laboratory setting, CPAP is initiated as the first modality at the lowest pressure (usually 4 or 5 cm H2O) and gradually raised to eliminate obstructive events during sleep as determined by polysomnography (PSG). If central apneas or hypopneas persist at this point, the following are options:  CPAP may be ramped up in 1 to 2 cm H2O intervals to no greater than an additional 5 cm H2O to assure that no residual obstruction (hypopneas, respiratory effort-related arousals [RERAs]) is present, and then gradually brought down to a setting where obstruction is resolved and the number of central apneas is the lowest (ie, the "optimal pressure") [9,10]. We prefer this method, as it assures that obstruction is no longer playing a role, which is important when further adjustments based on the interrogation of the device are needed.    Alternatively, some experts avoid ramping up pressure and allow a stabilization period of 10 minutes to occur [10]. Concerns about "pressure toxicity" and potential hemodynamic effects of high PAP in patients with CSA and heart failure has led some centers to place a pressure limit on the titration and refrain from using CPAP pressures of >10 to 18 cm H2O [10-13]. These pressure limits for CPAP are arbitrary and center-specific.  If central apneas persist at this point, low flow supplemental oxygen can be added [13]. This is especially true in patients with heart failure and left ventricular ejection fraction <45 percent for whom use " of adaptive servo-ventilation (ASV) may be harmful [14]. The caveat here is that although the addition of oxygen decreases central apnea activity acutely, if the patient does not have significant hypoxemia (SpO2 <88 percent), they may not qualify for oxygen.    If no control of central apnea activity is accomplished at this stage, and there is sufficient time left during the sleep study (>60 minutes), we typically transition to ASV in a multimodality protocol in patients with normal systolic left ventricular function. (See 'Adaptive servo-ventilation (ASV)' below.)    Unfortunately this patient does not have normal left ventricular function by last evaluation in 2019 so ASV is not an option.      A Continuous Positive Airway Pressure titration was ordered on 2020.  A BiPAP titration was performed  and demonstrated that BiPAP was effective - reduced AHI to zero but from a durable medical equipment standpoint we need to demonstrate that CPAP failed        Procedure Notes    Author Status Last  Updated Created   Jose David Gutierrez, PhD Signed Jose David Gutierrez, PhD 2020  5:50 PM 2020  5:49 PM          Assoc. Orders Procedures   POLYSOMNOGRAM POLYSOMNOGRAM       Pre-Op Dx Post-Op Dx   Obstructive sleep apnea None             Patient Name: Lavelle Ladd                       Date of Report: 20           Date of PS2020   [facility] No.: [id number]                          : 1955                      Time of PS:40:28 PM - 4:47:20 AM  Sex:  Male   Age:  64   Weight:  225.0 lbs      Height:  5  11                         Type of PSG:  Diagnostic      REASONS FOR REFERRAL: Mr. Ladd is a 64 year old male, referred for diagnostic polysomnography by Dr. Colin Garcia, based on the patients reported snoring, observed respiratory pauses in sleep, and excessive daytime sleepiness.  Dr. Rishabh Esteban is the patients primary care physician.     STUDY PARAMETERS: This diagnostic  study involved analysis of the patient's sleep pattern while breathing unassisted. The study was performed with a sleep technologist in attendance for the entire test period, with video monitoring throughout the study, and routine laboratory clinical parameters recorded:  NOTE: The polysomnography electrophysiological record for the patient has been reviewed in its entirety by Dr. Gutierrez.     SUMMARY STATEMENTS  DIAGNOSTIC IMPRESSIONS  G47.33  /  327.23                    Obstructive Sleep Apnea, Adult (OSAHS)  G47.39  /  327.27                    Severe Central Sleep Apnea Comorbid with Prescribed Opioid Use and  G47.37  /  428.0, 327.27         Central Sleep Apnea secondary to congestive heart failure (CHF)   F51.12   /  307.44                    Mild Insufficient Sleep Syndrome  Z72.821 /  V69.4                     Inadequate Sleep Hygiene  F51.04   /  307.42                    r/o Psychophysiological Insomnia (stress - related and / or conditioned)    G47.01   / 327.01                    r/o Insomnia due to Medical Condition (pain, discomfort)      PRIMARY TREATMENT RECOMMENDATIONS  Treat, or refer to Sleep Disorders Center.  1. The diagnostic polysomnography revealed a severe sleep apnea / hypopnea syndrome (A + H Index = 35.3 events / hr asleep with 8.6 respiratory event - related arousals / hr asleep for the study, and no RERAs (respiratory effort -  related arousals).  The mean SpO2 value was 92.5 %, moderate, minimum oxygen saturation during sleep was 87.0 %, and waking baseline SpO2 was 98 %.  Sporadic, moderately loud snoring was noted.  CPAP titration polysomnography is recommended.            NOTE: The overall apnea-hypopnea index (AHI) was 35.3 events /hr asleep.  Mr. Ladd had 53 hypopneas  8 obstructive sleep apneas, 60 central sleep apneas, and 3 mixed sleep apneas.  There was a 1.8 hour long series of periodic central sleep apneas, in which Cheyne - Solomon breathing morphology was not evident.   Mr. Ladd has congestive heart failure and takes hydrocodone - acetaminophen / NORCO by prescription.    2. Weight loss to the normal range is recommended as it can decrease respiratory events and snoring in overweight patients.  3. The following changes in sleep hygiene / sleep - related behavior are recommended after medical treatments are successful  · Set time for sleep to number of hours of sleep needed for adequate daytime functioning (7.5 to 9.0 hrs / night).     SECONDARY TREATMENT RECOMMENDATIONS  Treat, or refer to SDC if problems are not satisfactorily resolved by the above.  1. Follow - up inquiry regarding frequency, duration and nature of reported sleep loss and delayed sleep onset (r/o  stress - related and / or conditioned psychophysiological insomnia).  Please see SDI.  2. Follow - up inquiry regarding sleep disruption secondary to pain or other physical discomfort (please see SDI).     See below for a complete interpretation of data from the polysomnography and Sleep Disorders Inventory.     Thank you for referring this patient to the [facility].      Jose David Gutierrez, Ph.D., ABPP; Diplomate, American Board of Sleep Medicine            SECOND SLEEP STUDY:      Patient Name: Lavelle Ladd                       Date of Report: 20             Date of PS2020   [facility] No.: [id number]                          : 1955                         Time of PSG:  10:01:39 PM - 5:00:07 AM  Sex:  Male   Age:  64   Weight:  225.0 lbs      Height:  5  11                             Type of PSG:  BiPAP titration     REASONS FOR REFERRAL: Mr. Pinon diagnostic polysomnography (20) revealed an Apnea + Hypopnea Index = 35.3 events / hr asleep, severe sleep apnea / hypopnea syndrome (61 obstructive events, 60 central events, periodic but not Cheyne - Solomon) .  CPAP titration was recommended, and Dr. Colin Garcia, the referring physician, ordered it.  Dr. Rishabh Esteban is  the patients primary care physician.                                                                              STUDY PARAMETERS: This study involved analysis of the patient's sleep pattern while breathing was assisted. The study was performed with a sleep technologist in attendance for the entire test period, with video monitoring throughout the study, and routine laboratory clinical parameters recorded:  NOTE: The polysomnography electrophysiological record for the patient has been reviewed in its entirety by Dr. Gutierrez.     SUMMARY STATEMENTS  DIAGNOSTIC IMPRESSIONS  G47.33  /  327.23                    Mild to Moderate Obstructive Sleep Apnea, Adult (OSAHS)  G47.39  /  327.27                    Mild to Moderate Central Sleep Apnea Comorbid with Prescribed Opioid Use and  G47.37  /  428.0, 327.27         Central Sleep Apnea secondary to congestive heart failure (CHF)   F51.12   /  307.44                    Mild Insufficient Sleep Syndrome  Z72.821 /  V69.4                     Inadequate Sleep Hygiene  F51.04   /  307.42                    r/o Psychophysiological Insomnia (stress - related and / or conditioned)    G47.01   / 327.01                    r/o Insomnia due to Medical Condition (pain, discomfort)  NEW DIAGNOSTIC IMPRESSIONS  G47.61  /  327.51                    Periodic Limb Movement (PLM) Disorder      PRIMARY TREATMENT RECOMMENDATIONS  Treat, or refer to Sleep Disorders Center.  1. BiPAP was initiated at  10:01 pm.  The titration polysomnography revealed that BiPAP (Bi - Flex 2, humidity 3) of 18 cm IPAP  /  14 cm EPAP, the most effective pressure most completely tested, was optimal, as it reduced the rate of respiratory events to zero in 0.7 hrs sleep (0.14 hrs REM sleep).  Lower pressure also might be successful (16 cm / 12 cm A + H I = 0.0, with 0.35 hrs REM sleep  but with < 0.1 hr NREM sleep in only 0.4 hrs sleep). Snoring was eliminated at BiPAP 16 cm / 12 cm and above.    The overall A + H  Index was 2.5 / hr asleep, with only  0.8 respiratory event - related arousals / hr asleep with no RERAs for the titration trial.  The mean SpO2 value was 95.3  % throughout the study, mild, with a minimum oxygen saturation during sleep of 90.0  %  (waking baseline SpO2 was 99 %).   A medium ResMed Air Fit  F20  full -  face  CPAP mask was used and was tolerated,  but  sleep during CPAP was reduced. Please see Treatment Chart, below.     .     2. 16 cm / 12 cm BiPAP (Bi - Flex 2, [id number]) is recommended, but  only  after  successful habituation to PAP at home (gradually increased PAP pressures used for increasing amounts of time, initially while awake and occupied, and then while asleep).     3. Weight loss to the normal range is recommended as it can decrease respiratory events and snoring in overweight patients.     SECONDARY TREATMENT RECOMMENDATIONS  Treat, or refer to SDC if problems are not satisfactorily resolved by the above.  1. A severe  PLM disorder was observed (PLMS Index = 55.1 / hr sleep), but treatment might not be optimal because the PLMS were not disruptive of sleep (only 2.9 arousals / hr asleep), and there were no signs of restless legs syndrome in the SDI,   H & P or PSG;  consider treatment of PLM disorder only if PLMS symptoms are sufficiently bothersome to the patient.  Note that benzodiazepine medications sometimes used to treat PLM disorder (e.g., clonazepam) may exacerbate some sleep - related respiratory disorders, and dopaminergic medications such as Mirapex and Requip can be used in such instances.       The following treatment recommendations from the Sleep Disorder Evaluation of  1-23-20 likely still apply:     PRIMARY TREATMENT RECOMMENDATIONS  Treat, or refer to Sleep Disorders Center.  1. The diagnostic polysomnography revealed a severe sleep apnea / hypopnea syndrome (A + H Index = 35.3 events / hr asleep with 8.6 respiratory event - related arousals / hr asleep for the study,  and no RERAs (respiratory effort -  related arousals).  The mean SpO2 value was 92.5 %, moderate, minimum oxygen saturation during sleep was 87.0 %, and waking baseline SpO2 was 98 %.  Sporadic, moderately loud snoring was noted.  CPAP titration polysomnography is recommended.            NOTE: The overall apnea-hypopnea index (AHI) was 35.3 events /hr asleep.  Mr. Ladd had 53 hypopneas  8 obstructive sleep apneas, 60 central sleep apneas, and 3 mixed sleep apneas.  There was a 1.8 hour long series of periodic central sleep apneas, in which Cheyne - Solomon breathing morphology was not evident.  Mr. Ladd has congestive heart failure and takes hydrocodone - acetaminophen / NORCO by prescription.    2. Weight loss to the normal range is recommended as it can decrease respiratory events and snoring in overweight patients.  3. The following changes in sleep hygiene / sleep - related behavior are recommended after medical treatments are successful  · Set time for sleep to number of hours of sleep needed for adequate daytime functioning (7.5 to 9.0 hrs / night).     SECONDARY TREATMENT RECOMMENDATIONS  Treat, or refer to SDC if problems are not satisfactorily resolved by the above.  1. Follow - up inquiry regarding frequency, duration and nature of reported sleep loss and delayed sleep onset (r/o  stress - related and / or conditioned psychophysiological insomnia).  Please see SDI.  2. Follow - up inquiry regarding sleep disruption secondary to pain or other physical discomfort (please see SDI).     See below for a complete interpretation of data from the polysomnography and Sleep Disorders Inventory.     Thank you for referring this patient to the [facility].      Jose David Gutierrez, Ph.D., ABPP; Diplomate, American Board of Sleep Medicine

## 2020-11-18 ENCOUNTER — TELEPHONE (OUTPATIENT)
Dept: PULMONOLOGY | Facility: CLINIC | Age: 65
End: 2020-11-18

## 2020-11-19 DIAGNOSIS — G47.31 SEVERE CENTRAL SLEEP APNEA COMORBID WITH PRESCRIBED OPIOID USE: Primary | ICD-10-CM

## 2020-11-19 DIAGNOSIS — Z79.899 SEVERE CENTRAL SLEEP APNEA COMORBID WITH PRESCRIBED OPIOID USE: Primary | ICD-10-CM

## 2020-11-19 NOTE — TELEPHONE ENCOUNTER
Call placed, labs reviewed by Dr. Boston. Prograf level high. Patient reports true trough level. Will recheck on Thursday.   Glasses Rx given.

## 2020-11-23 ENCOUNTER — PATIENT OUTREACH (OUTPATIENT)
Dept: ADMINISTRATIVE | Facility: OTHER | Age: 65
End: 2020-11-23

## 2020-11-23 NOTE — PROGRESS NOTES
Health Maintenance Due   Topic Date Due    TETANUS VACCINE  07/25/1971    Shingles Vaccine (1 of 2) 07/25/2003    Colorectal Cancer Screening  01/18/2018    LDCT Lung Screen  11/08/2019    Lipid Panel  07/21/2020    Influenza Vaccine (1) 08/01/2020     Updates were requested from care everywhere.  Chart was reviewed for overdue Proactive [facility] Encounters ([facility]) topics (CRS, Breast Cancer Screening, Eye exam)  Health Maintenance has been updated.  LINKS immunization registry triggered.  LINKS not responding.

## 2020-11-24 ENCOUNTER — TELEPHONE (OUTPATIENT)
Dept: DIABETES | Facility: CLINIC | Age: 65
End: 2020-11-24

## 2020-11-24 ENCOUNTER — TELEPHONE (OUTPATIENT)
Dept: PULMONOLOGY | Facility: CLINIC | Age: 65
End: 2020-11-24

## 2020-11-24 DIAGNOSIS — E11.65 TYPE 2 DIABETES MELLITUS WITH HYPERGLYCEMIA, WITH LONG-TERM CURRENT USE OF INSULIN: ICD-10-CM

## 2020-11-24 DIAGNOSIS — Z79.4 TYPE 2 DIABETES MELLITUS WITH HYPERGLYCEMIA, WITH LONG-TERM CURRENT USE OF INSULIN: ICD-10-CM

## 2020-11-24 RX ORDER — CLOPIDOGREL BISULFATE 75 MG/1
75 TABLET ORAL DAILY
Qty: 90 TABLET | Refills: 1 | Status: SHIPPED | OUTPATIENT
Start: 2020-11-24 | End: 2021-04-07 | Stop reason: SDUPTHER

## 2020-11-24 RX ORDER — INSULIN DEGLUDEC 100 U/ML
30 INJECTION, SOLUTION SUBCUTANEOUS DAILY
Qty: 9 ML | Refills: 11 | Status: SHIPPED | OUTPATIENT
Start: 2020-11-24 | End: 2021-11-24

## 2020-11-24 RX ORDER — INSULIN ASPART 100 [IU]/ML
INJECTION, SOLUTION INTRAVENOUS; SUBCUTANEOUS
Qty: 6 ML | Refills: 11 | Status: SHIPPED | OUTPATIENT
Start: 2020-11-24 | End: 2022-01-01

## 2020-11-24 RX ORDER — ATORVASTATIN CALCIUM 80 MG/1
80 TABLET, FILM COATED ORAL DAILY
Qty: 90 TABLET | Refills: 1 | Status: SHIPPED | OUTPATIENT
Start: 2020-11-24 | End: 2021-04-07 | Stop reason: SDUPTHER

## 2020-11-24 RX ORDER — AMLODIPINE BESYLATE 10 MG/1
10 TABLET ORAL DAILY
Qty: 90 TABLET | Refills: 1 | Status: SHIPPED | OUTPATIENT
Start: 2020-11-24 | End: 2021-04-23 | Stop reason: SDUPTHER

## 2020-11-24 RX ORDER — SEMAGLUTIDE 1.34 MG/ML
0.5 INJECTION, SOLUTION SUBCUTANEOUS WEEKLY
Qty: 2 ML | Refills: 11 | Status: SHIPPED | OUTPATIENT
Start: 2020-11-24 | End: 2021-02-11

## 2020-11-24 NOTE — TELEPHONE ENCOUNTER
Follow up scheduled.   ----- Message from Bren Ruiz PA-C sent at 11/24/2020  2:11 PM CST -----  Needs follow up

## 2020-12-03 DIAGNOSIS — R60.0 LOCALIZED EDEMA: ICD-10-CM

## 2020-12-03 RX ORDER — FUROSEMIDE 40 MG/1
40 TABLET ORAL DAILY PRN
Qty: 30 TABLET | Refills: 11 | Status: SHIPPED | OUTPATIENT
Start: 2020-12-03 | End: 2021-09-15 | Stop reason: SDUPTHER

## 2020-12-15 DIAGNOSIS — I25.10 CORONARY ARTERY DISEASE WITHOUT ANGINA PECTORIS, UNSPECIFIED VESSEL OR LESION TYPE, UNSPECIFIED WHETHER NATIVE OR TRANSPLANTED HEART: ICD-10-CM

## 2020-12-15 RX ORDER — METOPROLOL TARTRATE 25 MG/1
25 TABLET, FILM COATED ORAL 2 TIMES DAILY
Qty: 180 TABLET | Refills: 1 | Status: ON HOLD | OUTPATIENT
Start: 2020-12-15 | End: 2022-01-01 | Stop reason: HOSPADM

## 2020-12-16 ENCOUNTER — PES CALL (OUTPATIENT)
Dept: ADMINISTRATIVE | Facility: CLINIC | Age: 65
End: 2020-12-16

## 2020-12-21 ENCOUNTER — PATIENT OUTREACH (OUTPATIENT)
Dept: ADMINISTRATIVE | Facility: HOSPITAL | Age: 65
End: 2020-12-21

## 2020-12-21 NOTE — PROGRESS NOTES
Working Diabetes Report     Called Pt to schedule A1c labs.  L/M for Pt to call       Leslye REHMAN LPN Care Coordinator  Care Coordination Department  [facility]  [phone]

## 2021-01-07 ENCOUNTER — PES CALL (OUTPATIENT)
Dept: ADMINISTRATIVE | Facility: CLINIC | Age: 66
End: 2021-01-07

## 2021-01-08 ENCOUNTER — PATIENT MESSAGE (OUTPATIENT)
Dept: TRANSPLANT | Facility: CLINIC | Age: 66
End: 2021-01-08

## 2021-01-29 ENCOUNTER — PATIENT MESSAGE (OUTPATIENT)
Dept: DIABETES | Facility: CLINIC | Age: 66
End: 2021-01-29

## 2021-02-11 ENCOUNTER — OFFICE VISIT (OUTPATIENT)
Dept: DIABETES | Facility: CLINIC | Age: 66
End: 2021-02-11
Payer: MEDICARE

## 2021-02-11 VITALS
DIASTOLIC BLOOD PRESSURE: 79 MMHG | SYSTOLIC BLOOD PRESSURE: 140 MMHG | WEIGHT: 224 LBS | HEART RATE: 72 BPM | BODY MASS INDEX: 31.24 KG/M2

## 2021-02-11 DIAGNOSIS — E78.2 MIXED HYPERLIPIDEMIA: ICD-10-CM

## 2021-02-11 DIAGNOSIS — E66.9 OBESITY (BMI 30-39.9): ICD-10-CM

## 2021-02-11 DIAGNOSIS — I73.9 PERIPHERAL VASCULAR DISEASE: ICD-10-CM

## 2021-02-11 DIAGNOSIS — Z89.9 H/O AMPUTATION: ICD-10-CM

## 2021-02-11 DIAGNOSIS — Z79.4 TYPE 2 DIABETES MELLITUS WITH HYPERGLYCEMIA, WITH LONG-TERM CURRENT USE OF INSULIN: Primary | ICD-10-CM

## 2021-02-11 DIAGNOSIS — E11.65 TYPE 2 DIABETES MELLITUS WITH HYPERGLYCEMIA, WITH LONG-TERM CURRENT USE OF INSULIN: Primary | ICD-10-CM

## 2021-02-11 DIAGNOSIS — E11.42 DIABETIC POLYNEUROPATHY ASSOCIATED WITH TYPE 2 DIABETES MELLITUS: ICD-10-CM

## 2021-02-11 DIAGNOSIS — I25.10 CAD IN NATIVE ARTERY: ICD-10-CM

## 2021-02-11 DIAGNOSIS — I10 ESSENTIAL HYPERTENSION: ICD-10-CM

## 2021-02-11 DIAGNOSIS — Z94.0 S/P KIDNEY TRANSPLANT: ICD-10-CM

## 2021-02-11 DIAGNOSIS — K31.84 GASTROPARESIS: ICD-10-CM

## 2021-02-11 DIAGNOSIS — N18.30 STAGE 3 CHRONIC KIDNEY DISEASE, UNSPECIFIED WHETHER STAGE 3A OR 3B CKD: ICD-10-CM

## 2021-02-11 LAB — GLUCOSE SERPL-MCNC: 188 MG/DL (ref 70–110)

## 2021-02-11 PROCEDURE — 82962 GLUCOSE BLOOD TEST: CPT | Mod: S$GLB,,, | Performed by: PHYSICIAN ASSISTANT

## 2021-02-11 PROCEDURE — 3008F PR BODY MASS INDEX (BMI) DOCUMENTED: ICD-10-PCS | Mod: CPTII,S$GLB,, | Performed by: PHYSICIAN ASSISTANT

## 2021-02-11 PROCEDURE — 1159F MED LIST DOCD IN RCRD: CPT | Mod: S$GLB,,, | Performed by: PHYSICIAN ASSISTANT

## 2021-02-11 PROCEDURE — 99214 PR OFFICE/OUTPT VISIT, EST, LEVL IV, 30-39 MIN: ICD-10-PCS | Mod: S$GLB,,, | Performed by: PHYSICIAN ASSISTANT

## 2021-02-11 PROCEDURE — 3288F FALL RISK ASSESSMENT DOCD: CPT | Mod: CPTII,S$GLB,, | Performed by: PHYSICIAN ASSISTANT

## 2021-02-11 PROCEDURE — 99999 PR PBB SHADOW E&M-EST. PATIENT-LVL V: ICD-10-PCS | Mod: PBBFAC,,, | Performed by: PHYSICIAN ASSISTANT

## 2021-02-11 PROCEDURE — 82962 POCT GLUCOSE, HAND-HELD DEVICE: ICD-10-PCS | Mod: S$GLB,,, | Performed by: PHYSICIAN ASSISTANT

## 2021-02-11 PROCEDURE — 1101F PT FALLS ASSESS-DOCD LE1/YR: CPT | Mod: CPTII,S$GLB,, | Performed by: PHYSICIAN ASSISTANT

## 2021-02-11 PROCEDURE — 95251 PR GLUCOSE MONITOR, 72 HOUR, PHYS INTERP: ICD-10-PCS | Mod: S$GLB,,, | Performed by: PHYSICIAN ASSISTANT

## 2021-02-11 PROCEDURE — 3077F SYST BP >= 140 MM HG: CPT | Mod: CPTII,S$GLB,, | Performed by: PHYSICIAN ASSISTANT

## 2021-02-11 PROCEDURE — 99499 RISK ADDL DX/OHS AUDIT: ICD-10-PCS | Mod: S$GLB,,, | Performed by: PHYSICIAN ASSISTANT

## 2021-02-11 PROCEDURE — 99499 UNLISTED E&M SERVICE: CPT | Mod: S$GLB,,, | Performed by: PHYSICIAN ASSISTANT

## 2021-02-11 PROCEDURE — 3288F PR FALLS RISK ASSESSMENT DOCUMENTED: ICD-10-PCS | Mod: CPTII,S$GLB,, | Performed by: PHYSICIAN ASSISTANT

## 2021-02-11 PROCEDURE — 3078F PR MOST RECENT DIASTOLIC BLOOD PRESSURE < 80 MM HG: ICD-10-PCS | Mod: CPTII,S$GLB,, | Performed by: PHYSICIAN ASSISTANT

## 2021-02-11 PROCEDURE — 3077F PR MOST RECENT SYSTOLIC BLOOD PRESSURE >= 140 MM HG: ICD-10-PCS | Mod: CPTII,S$GLB,, | Performed by: PHYSICIAN ASSISTANT

## 2021-02-11 PROCEDURE — 99999 PR PBB SHADOW E&M-EST. PATIENT-LVL V: CPT | Mod: PBBFAC,,, | Performed by: PHYSICIAN ASSISTANT

## 2021-02-11 PROCEDURE — 1159F PR MEDICATION LIST DOCUMENTED IN MEDICAL RECORD: ICD-10-PCS | Mod: S$GLB,,, | Performed by: PHYSICIAN ASSISTANT

## 2021-02-11 PROCEDURE — 3078F DIAST BP <80 MM HG: CPT | Mod: CPTII,S$GLB,, | Performed by: PHYSICIAN ASSISTANT

## 2021-02-11 PROCEDURE — 95251 CONT GLUC MNTR ANALYSIS I&R: CPT | Mod: S$GLB,,, | Performed by: PHYSICIAN ASSISTANT

## 2021-02-11 PROCEDURE — 3046F PR MOST RECENT HEMOGLOBIN A1C LEVEL > 9.0%: ICD-10-PCS | Mod: CPTII,S$GLB,, | Performed by: PHYSICIAN ASSISTANT

## 2021-02-11 PROCEDURE — 3046F HEMOGLOBIN A1C LEVEL >9.0%: CPT | Mod: CPTII,S$GLB,, | Performed by: PHYSICIAN ASSISTANT

## 2021-02-11 PROCEDURE — 3008F BODY MASS INDEX DOCD: CPT | Mod: CPTII,S$GLB,, | Performed by: PHYSICIAN ASSISTANT

## 2021-02-11 PROCEDURE — 99214 OFFICE O/P EST MOD 30 MIN: CPT | Mod: S$GLB,,, | Performed by: PHYSICIAN ASSISTANT

## 2021-02-11 PROCEDURE — 1101F PR PT FALLS ASSESS DOC 0-1 FALLS W/OUT INJ PAST YR: ICD-10-PCS | Mod: CPTII,S$GLB,, | Performed by: PHYSICIAN ASSISTANT

## 2021-02-11 RX ORDER — SEMAGLUTIDE 1.34 MG/ML
INJECTION, SOLUTION SUBCUTANEOUS
Qty: 6 SYRINGE | Refills: 3 | Status: SHIPPED | OUTPATIENT
Start: 2021-02-11

## 2021-03-04 ENCOUNTER — TELEPHONE (OUTPATIENT)
Dept: DIABETES | Facility: CLINIC | Age: 66
End: 2021-03-04

## 2021-03-28 ENCOUNTER — TELEPHONE (OUTPATIENT)
Dept: ADMINISTRATIVE | Facility: CLINIC | Age: 66
End: 2021-03-28

## 2021-04-07 RX ORDER — CLOPIDOGREL BISULFATE 75 MG/1
75 TABLET ORAL DAILY
Qty: 90 TABLET | Refills: 1 | Status: SHIPPED | OUTPATIENT
Start: 2021-04-07 | End: 2021-11-24 | Stop reason: SDUPTHER

## 2021-04-07 RX ORDER — ATORVASTATIN CALCIUM 80 MG/1
80 TABLET, FILM COATED ORAL DAILY
Qty: 90 TABLET | Refills: 1 | Status: SHIPPED | OUTPATIENT
Start: 2021-04-07 | End: 2022-01-01 | Stop reason: SDUPTHER

## 2021-04-15 ENCOUNTER — PATIENT MESSAGE (OUTPATIENT)
Dept: CARDIOLOGY | Facility: CLINIC | Age: 66
End: 2021-04-15

## 2021-04-16 DIAGNOSIS — Z94.0 KIDNEY REPLACED BY TRANSPLANT: Primary | ICD-10-CM

## 2021-04-23 RX ORDER — AMLODIPINE BESYLATE 10 MG/1
10 TABLET ORAL DAILY
Qty: 90 TABLET | Refills: 1 | Status: SHIPPED | OUTPATIENT
Start: 2021-04-23 | End: 2022-01-01 | Stop reason: SDUPTHER

## 2021-05-27 ENCOUNTER — OFFICE VISIT (OUTPATIENT)
Dept: DIABETES | Facility: CLINIC | Age: 66
End: 2021-05-27
Payer: MEDICARE

## 2021-05-27 VITALS — DIASTOLIC BLOOD PRESSURE: 80 MMHG | SYSTOLIC BLOOD PRESSURE: 121 MMHG | HEART RATE: 87 BPM

## 2021-05-27 DIAGNOSIS — Z79.4 TYPE 2 DIABETES MELLITUS WITH HYPERGLYCEMIA, WITH LONG-TERM CURRENT USE OF INSULIN: Primary | ICD-10-CM

## 2021-05-27 DIAGNOSIS — E78.2 MIXED HYPERLIPIDEMIA: ICD-10-CM

## 2021-05-27 DIAGNOSIS — E11.42 DIABETIC POLYNEUROPATHY ASSOCIATED WITH TYPE 2 DIABETES MELLITUS: ICD-10-CM

## 2021-05-27 DIAGNOSIS — I25.10 CAD IN NATIVE ARTERY: ICD-10-CM

## 2021-05-27 DIAGNOSIS — Z89.9 H/O AMPUTATION: ICD-10-CM

## 2021-05-27 DIAGNOSIS — N18.30 STAGE 3 CHRONIC KIDNEY DISEASE, UNSPECIFIED WHETHER STAGE 3A OR 3B CKD: ICD-10-CM

## 2021-05-27 DIAGNOSIS — I10 ESSENTIAL HYPERTENSION: ICD-10-CM

## 2021-05-27 DIAGNOSIS — I73.9 PERIPHERAL VASCULAR DISEASE: ICD-10-CM

## 2021-05-27 DIAGNOSIS — E66.9 OBESITY (BMI 30-39.9): ICD-10-CM

## 2021-05-27 DIAGNOSIS — K31.84 GASTROPARESIS: ICD-10-CM

## 2021-05-27 DIAGNOSIS — E11.65 TYPE 2 DIABETES MELLITUS WITH HYPERGLYCEMIA, WITH LONG-TERM CURRENT USE OF INSULIN: Primary | ICD-10-CM

## 2021-05-27 DIAGNOSIS — Z94.0 S/P KIDNEY TRANSPLANT: ICD-10-CM

## 2021-05-27 PROCEDURE — 95251 PR GLUCOSE MONITOR, 72 HOUR, PHYS INTERP: ICD-10-PCS | Mod: S$GLB,,, | Performed by: PHYSICIAN ASSISTANT

## 2021-05-27 PROCEDURE — 1125F PR PAIN SEVERITY QUANTIFIED, PAIN PRESENT: ICD-10-PCS | Mod: S$GLB,,, | Performed by: PHYSICIAN ASSISTANT

## 2021-05-27 PROCEDURE — 1101F PR PT FALLS ASSESS DOC 0-1 FALLS W/OUT INJ PAST YR: ICD-10-PCS | Mod: CPTII,S$GLB,, | Performed by: PHYSICIAN ASSISTANT

## 2021-05-27 PROCEDURE — 3288F FALL RISK ASSESSMENT DOCD: CPT | Mod: CPTII,S$GLB,, | Performed by: PHYSICIAN ASSISTANT

## 2021-05-27 PROCEDURE — 99999 PR PBB SHADOW E&M-EST. PATIENT-LVL II: CPT | Mod: PBBFAC,,, | Performed by: PHYSICIAN ASSISTANT

## 2021-05-27 PROCEDURE — 99214 OFFICE O/P EST MOD 30 MIN: CPT | Mod: S$GLB,,, | Performed by: PHYSICIAN ASSISTANT

## 2021-05-27 PROCEDURE — 99499 UNLISTED E&M SERVICE: CPT | Mod: S$GLB,,, | Performed by: PHYSICIAN ASSISTANT

## 2021-05-27 PROCEDURE — 1101F PT FALLS ASSESS-DOCD LE1/YR: CPT | Mod: CPTII,S$GLB,, | Performed by: PHYSICIAN ASSISTANT

## 2021-05-27 PROCEDURE — 95251 CONT GLUC MNTR ANALYSIS I&R: CPT | Mod: S$GLB,,, | Performed by: PHYSICIAN ASSISTANT

## 2021-05-27 PROCEDURE — 1159F PR MEDICATION LIST DOCUMENTED IN MEDICAL RECORD: ICD-10-PCS | Mod: S$GLB,,, | Performed by: PHYSICIAN ASSISTANT

## 2021-05-27 PROCEDURE — 1125F AMNT PAIN NOTED PAIN PRSNT: CPT | Mod: S$GLB,,, | Performed by: PHYSICIAN ASSISTANT

## 2021-05-27 PROCEDURE — 99999 PR PBB SHADOW E&M-EST. PATIENT-LVL II: ICD-10-PCS | Mod: PBBFAC,,, | Performed by: PHYSICIAN ASSISTANT

## 2021-05-27 PROCEDURE — 99499 RISK ADDL DX/OHS AUDIT: ICD-10-PCS | Mod: S$GLB,,, | Performed by: PHYSICIAN ASSISTANT

## 2021-05-27 PROCEDURE — 1159F MED LIST DOCD IN RCRD: CPT | Mod: S$GLB,,, | Performed by: PHYSICIAN ASSISTANT

## 2021-05-27 PROCEDURE — 99214 PR OFFICE/OUTPT VISIT, EST, LEVL IV, 30-39 MIN: ICD-10-PCS | Mod: S$GLB,,, | Performed by: PHYSICIAN ASSISTANT

## 2021-05-27 PROCEDURE — 3288F PR FALLS RISK ASSESSMENT DOCUMENTED: ICD-10-PCS | Mod: CPTII,S$GLB,, | Performed by: PHYSICIAN ASSISTANT

## 2021-06-21 RX ORDER — FLASH GLUCOSE SENSOR
1 KIT MISCELLANEOUS
Qty: 2 KIT | Refills: 11 | Status: SHIPPED | OUTPATIENT
Start: 2021-06-21 | End: 2022-01-01 | Stop reason: CLARIF

## 2021-07-01 ENCOUNTER — PATIENT MESSAGE (OUTPATIENT)
Dept: ADMINISTRATIVE | Facility: OTHER | Age: 66
End: 2021-07-01

## 2021-07-16 DIAGNOSIS — R45.89 ANXIETY ABOUT HEALTH: ICD-10-CM

## 2021-07-16 DIAGNOSIS — F33.1 MODERATE EPISODE OF RECURRENT MAJOR DEPRESSIVE DISORDER: ICD-10-CM

## 2021-07-16 RX ORDER — SERTRALINE HYDROCHLORIDE 25 MG/1
25 TABLET, FILM COATED ORAL DAILY
Qty: 30 TABLET | Refills: 11 | Status: SHIPPED | OUTPATIENT
Start: 2021-07-16 | End: 2022-01-01 | Stop reason: SDUPTHER

## 2021-08-11 NOTE — ASSESSMENT & PLAN NOTE
Patient's renal function has improved with creatinine declining to 1.5.     Continue immunosuppression with Prograf, prednisone.     Will monitor closely with repeat renal panel.    Tacrolimus at goal.    [FreeTextEntry1] : CRAFFT=0 [[id number]] : 0

## 2021-08-15 ENCOUNTER — HOSPITAL ENCOUNTER (EMERGENCY)
Facility: HOSPITAL | Age: 66
Discharge: HOME | End: 2021-08-15
Attending: EMERGENCY MEDICINE
Payer: MEDICARE

## 2021-08-15 ENCOUNTER — NURSE TRIAGE (OUTPATIENT)
Dept: ADMINISTRATIVE | Facility: CLINIC | Age: 66
End: 2021-08-15

## 2021-08-15 VITALS
HEART RATE: 87 BPM | BODY MASS INDEX: 31.64 KG/M2 | TEMPERATURE: 98 F | RESPIRATION RATE: 18 BRPM | OXYGEN SATURATION: 97 % | DIASTOLIC BLOOD PRESSURE: 82 MMHG | WEIGHT: 226 LBS | HEIGHT: 71 IN | SYSTOLIC BLOOD PRESSURE: 121 MMHG

## 2021-08-15 DIAGNOSIS — N18.9 CHRONIC RENAL IMPAIRMENT, UNSPECIFIED CKD STAGE: ICD-10-CM

## 2021-08-15 DIAGNOSIS — K52.9 GASTROENTERITIS: Primary | ICD-10-CM

## 2021-08-15 DIAGNOSIS — Z20.822 SUSPECTED COVID-19 VIRUS INFECTION: ICD-10-CM

## 2021-08-15 LAB
ALBUMIN SERPL BCP-MCNC: 3.3 G/DL (ref 3.5–5.2)
ALP SERPL-CCNC: 108 U/L (ref 55–135)
ALT SERPL W/O P-5'-P-CCNC: 43 U/L (ref 10–44)
ANION GAP SERPL CALC-SCNC: 16 MMOL/L (ref 8–16)
AST SERPL-CCNC: 47 U/L (ref 10–40)
BASOPHILS # BLD AUTO: 0.01 K/UL (ref 0–0.2)
BASOPHILS NFR BLD: 0.1 % (ref 0–1.9)
BILIRUB SERPL-MCNC: 1.7 MG/DL (ref 0.1–1)
BUN SERPL-MCNC: 18 MG/DL (ref 8–23)
CALCIUM SERPL-MCNC: 9.5 MG/DL (ref 8.7–10.5)
CHLORIDE SERPL-SCNC: 94 MMOL/L (ref 95–110)
CK SERPL-CCNC: 45 U/L (ref 20–200)
CO2 SERPL-SCNC: 23 MMOL/L (ref 23–29)
CREAT SERPL-MCNC: 1.7 MG/DL (ref 0.5–1.4)
CRP SERPL-MCNC: 1.2 MG/L (ref 0–8.2)
CTP QC/QA: YES
DIFFERENTIAL METHOD: ABNORMAL
EOSINOPHIL # BLD AUTO: 0 K/UL (ref 0–0.5)
EOSINOPHIL NFR BLD: 0 % (ref 0–8)
ERYTHROCYTE [DISTWIDTH] IN BLOOD BY AUTOMATED COUNT: 13.2 % (ref 11.5–14.5)
EST. GFR  (AFRICAN AMERICAN): 47 ML/MIN/1.73 M^2
EST. GFR  (NON AFRICAN AMERICAN): 41 ML/MIN/1.73 M^2
FERRITIN SERPL-MCNC: 151 NG/ML (ref 20–300)
GLUCOSE SERPL-MCNC: 285 MG/DL (ref 70–110)
HCT VFR BLD AUTO: 50.1 % (ref 40–54)
HGB BLD-MCNC: 16.9 G/DL (ref 14–18)
IMM GRANULOCYTES # BLD AUTO: 0.04 K/UL (ref 0–0.04)
IMM GRANULOCYTES NFR BLD AUTO: 0.5 % (ref 0–0.5)
LACTATE SERPL-SCNC: 2.5 MMOL/L (ref 0.5–2.2)
LDH SERPL L TO P-CCNC: 909 U/L (ref 110–260)
LIPASE SERPL-CCNC: 21 U/L (ref 4–60)
LYMPHOCYTES # BLD AUTO: 1 K/UL (ref 1–4.8)
LYMPHOCYTES NFR BLD: 13.4 % (ref 18–48)
MCH RBC QN AUTO: 31.3 PG (ref 27–31)
MCHC RBC AUTO-ENTMCNC: 33.7 G/DL (ref 32–36)
MCV RBC AUTO: 93 FL (ref 82–98)
MONOCYTES # BLD AUTO: 0.6 K/UL (ref 0.3–1)
MONOCYTES NFR BLD: 8.7 % (ref 4–15)
NEUTROPHILS # BLD AUTO: 5.7 K/UL (ref 1.8–7.7)
NEUTROPHILS NFR BLD: 77.3 % (ref 38–73)
NRBC BLD-RTO: 0 /100 WBC
PLATELET # BLD AUTO: 172 K/UL (ref 150–450)
PMV BLD AUTO: 9.7 FL (ref 9.2–12.9)
POTASSIUM SERPL-SCNC: 4.7 MMOL/L (ref 3.5–5.1)
PROT SERPL-MCNC: 7.3 G/DL (ref 6–8.4)
RBC # BLD AUTO: 5.4 M/UL (ref 4.6–6.2)
SARS-COV-2 RDRP RESP QL NAA+PROBE: NEGATIVE
SODIUM SERPL-SCNC: 133 MMOL/L (ref 136–145)
TROPONIN I SERPL DL<=0.01 NG/ML-MCNC: 0.01 NG/ML (ref 0–0.03)
WBC # BLD AUTO: 7.33 K/UL (ref 3.9–12.7)

## 2021-08-15 PROCEDURE — 99285 EMERGENCY DEPT VISIT HI MDM: CPT | Mod: 25

## 2021-08-15 PROCEDURE — 85025 COMPLETE CBC W/AUTO DIFF WBC: CPT | Performed by: EMERGENCY MEDICINE

## 2021-08-15 PROCEDURE — 80053 COMPREHEN METABOLIC PANEL: CPT | Performed by: EMERGENCY MEDICINE

## 2021-08-15 PROCEDURE — 84484 ASSAY OF TROPONIN QUANT: CPT | Performed by: EMERGENCY MEDICINE

## 2021-08-15 PROCEDURE — 93010 ELECTROCARDIOGRAM REPORT: CPT | Mod: ,,, | Performed by: INTERNAL MEDICINE

## 2021-08-15 PROCEDURE — 83690 ASSAY OF LIPASE: CPT | Performed by: EMERGENCY MEDICINE

## 2021-08-15 PROCEDURE — 83615 LACTATE (LD) (LDH) ENZYME: CPT | Performed by: EMERGENCY MEDICINE

## 2021-08-15 PROCEDURE — 93010 EKG 12-LEAD: ICD-10-PCS | Mod: ,,, | Performed by: INTERNAL MEDICINE

## 2021-08-15 PROCEDURE — 25000003 PHARM REV CODE 250: Performed by: EMERGENCY MEDICINE

## 2021-08-15 PROCEDURE — 63600175 PHARM REV CODE 636 W HCPCS: Performed by: EMERGENCY MEDICINE

## 2021-08-15 PROCEDURE — 96374 THER/PROPH/DIAG INJ IV PUSH: CPT

## 2021-08-15 PROCEDURE — U0002 COVID-19 LAB TEST NON-CDC: HCPCS | Performed by: EMERGENCY MEDICINE

## 2021-08-15 PROCEDURE — 93005 ELECTROCARDIOGRAM TRACING: CPT

## 2021-08-15 PROCEDURE — 82728 ASSAY OF FERRITIN: CPT | Performed by: EMERGENCY MEDICINE

## 2021-08-15 PROCEDURE — 82550 ASSAY OF CK (CPK): CPT | Performed by: EMERGENCY MEDICINE

## 2021-08-15 PROCEDURE — 86140 C-REACTIVE PROTEIN: CPT | Performed by: EMERGENCY MEDICINE

## 2021-08-15 PROCEDURE — 96361 HYDRATE IV INFUSION ADD-ON: CPT

## 2021-08-15 PROCEDURE — 83605 ASSAY OF LACTIC ACID: CPT | Performed by: EMERGENCY MEDICINE

## 2021-08-15 RX ORDER — ONDANSETRON 2 MG/ML
4 INJECTION INTRAMUSCULAR; INTRAVENOUS
Status: COMPLETED | OUTPATIENT
Start: 2021-08-15 | End: 2021-08-15

## 2021-08-15 RX ORDER — ONDANSETRON 4 MG/1
4 TABLET, FILM COATED ORAL EVERY 6 HOURS PRN
Qty: 12 TABLET | Refills: 0 | Status: SHIPPED | OUTPATIENT
Start: 2021-08-15 | End: 2022-01-01 | Stop reason: SDUPTHER

## 2021-08-15 RX ADMIN — SODIUM CHLORIDE 500 ML: 0.9 INJECTION, SOLUTION INTRAVENOUS at 06:08

## 2021-08-15 RX ADMIN — ONDANSETRON 4 MG: 2 INJECTION INTRAMUSCULAR; INTRAVENOUS at 06:08

## 2021-08-16 ENCOUNTER — DOCUMENTATION ONLY (OUTPATIENT)
Dept: TRANSPLANT | Facility: CLINIC | Age: 66
End: 2021-08-16

## 2021-09-15 ENCOUNTER — TELEPHONE (OUTPATIENT)
Dept: TRANSPLANT | Facility: CLINIC | Age: 66
End: 2021-09-15

## 2021-09-15 DIAGNOSIS — R60.0 LOCALIZED EDEMA: ICD-10-CM

## 2021-09-15 RX ORDER — FUROSEMIDE 40 MG/1
40 TABLET ORAL DAILY PRN
Qty: 30 TABLET | Refills: 0 | Status: ON HOLD | OUTPATIENT
Start: 2021-09-15 | End: 2022-01-01 | Stop reason: HOSPADM

## 2021-09-22 NOTE — ED NOTES
Pt lying in bed resting with eyes closed in NAD, VSS, RR equal and unlabored. Pt given blanket per request. Bed locked and low with side rails up and call bell in reach. Pt updated on POC.    63 y/o Female hx of ALLEY (on CPAP), CKD, HTN, COVID 19 [2020] , Type 2 DM (on insulin), pulmonary HTN a/w NSTEMI,

## 2021-11-11 DIAGNOSIS — R60.0 LOCALIZED EDEMA: ICD-10-CM

## 2021-11-11 RX ORDER — FUROSEMIDE 40 MG/1
40 TABLET ORAL DAILY PRN
Qty: 30 TABLET | Refills: 0 | OUTPATIENT
Start: 2021-11-11 | End: 2022-01-01

## 2021-11-24 RX ORDER — CLOPIDOGREL BISULFATE 75 MG/1
75 TABLET ORAL DAILY
Qty: 30 TABLET | Refills: 0 | Status: SHIPPED | OUTPATIENT
Start: 2021-11-24 | End: 2022-01-01

## 2022-01-01 ENCOUNTER — PATIENT OUTREACH (OUTPATIENT)
Dept: ADMINISTRATIVE | Facility: HOSPITAL | Age: 67
End: 2022-01-01
Payer: MEDICARE

## 2022-01-01 ENCOUNTER — HOSPITAL ENCOUNTER (INPATIENT)
Facility: HOSPITAL | Age: 67
LOS: 8 days | Discharge: NURSING, REHAB OR LONG-TERM CARE | DRG: 464 | End: 2022-08-12
Attending: EMERGENCY MEDICINE | Admitting: FAMILY MEDICINE
Payer: MEDICARE

## 2022-01-01 ENCOUNTER — TELEPHONE (OUTPATIENT)
Dept: SPORTS MEDICINE | Facility: CLINIC | Age: 67
End: 2022-01-01
Payer: MEDICARE

## 2022-01-01 ENCOUNTER — HOSPITAL ENCOUNTER (INPATIENT)
Facility: HOSPITAL | Age: 67
LOS: 33 days | Discharge: NURSING, REHAB OR LONG-TERM CARE | DRG: 474 | End: 2022-10-17
Attending: EMERGENCY MEDICINE | Admitting: EMERGENCY MEDICINE
Payer: MEDICARE

## 2022-01-01 ENCOUNTER — HOSPITAL ENCOUNTER (EMERGENCY)
Facility: HOSPITAL | Age: 67
Discharge: HOME | End: 2022-11-12
Attending: EMERGENCY MEDICINE
Payer: MEDICARE

## 2022-01-01 ENCOUNTER — PATIENT MESSAGE (OUTPATIENT)
Dept: RESEARCH | Facility: CLINIC | Age: 67
End: 2022-01-01
Payer: MEDICARE

## 2022-01-01 ENCOUNTER — PATIENT OUTREACH (OUTPATIENT)
Dept: ADMINISTRATIVE | Facility: CLINIC | Age: 67
End: 2022-01-01
Payer: MEDICARE

## 2022-01-01 ENCOUNTER — ANESTHESIA (OUTPATIENT)
Dept: SURGERY | Facility: HOSPITAL | Age: 67
DRG: 474 | End: 2022-01-01
Payer: MEDICARE

## 2022-01-01 ENCOUNTER — ANESTHESIA EVENT (OUTPATIENT)
Dept: SURGERY | Facility: HOSPITAL | Age: 67
DRG: 474 | End: 2022-01-01
Payer: MEDICARE

## 2022-01-01 ENCOUNTER — TELEPHONE (OUTPATIENT)
Dept: ORTHOPEDICS | Facility: CLINIC | Age: 67
End: 2022-01-01
Payer: MEDICARE

## 2022-01-01 ENCOUNTER — ANESTHESIA EVENT (OUTPATIENT)
Dept: SURGERY | Facility: HOSPITAL | Age: 67
DRG: 464 | End: 2022-01-01
Payer: MEDICARE

## 2022-01-01 ENCOUNTER — TELEPHONE (OUTPATIENT)
Dept: INTERNAL MEDICINE | Facility: CLINIC | Age: 67
End: 2022-01-01
Payer: MEDICARE

## 2022-01-01 ENCOUNTER — ANESTHESIA (OUTPATIENT)
Dept: SURGERY | Facility: HOSPITAL | Age: 67
DRG: 464 | End: 2022-01-01
Payer: MEDICARE

## 2022-01-01 ENCOUNTER — HOSPITAL ENCOUNTER (INPATIENT)
Facility: HOSPITAL | Age: 67
LOS: 2 days | Discharge: NURSING, REHAB OR LONG-TERM CARE | DRG: 871 | End: 2022-09-02
Attending: EMERGENCY MEDICINE | Admitting: INTERNAL MEDICINE
Payer: MEDICARE

## 2022-01-01 VITALS
BODY MASS INDEX: 29.78 KG/M2 | HEART RATE: 81 BPM | RESPIRATION RATE: 18 BRPM | WEIGHT: 212.75 LBS | DIASTOLIC BLOOD PRESSURE: 66 MMHG | OXYGEN SATURATION: 96 % | SYSTOLIC BLOOD PRESSURE: 139 MMHG | TEMPERATURE: 98 F | HEIGHT: 71 IN

## 2022-01-01 VITALS
WEIGHT: 252.88 LBS | HEART RATE: 70 BPM | TEMPERATURE: 98 F | DIASTOLIC BLOOD PRESSURE: 64 MMHG | BODY MASS INDEX: 35.4 KG/M2 | OXYGEN SATURATION: 96 % | RESPIRATION RATE: 18 BRPM | HEIGHT: 71 IN | SYSTOLIC BLOOD PRESSURE: 123 MMHG

## 2022-01-01 VITALS
WEIGHT: 194 LBS | SYSTOLIC BLOOD PRESSURE: 140 MMHG | RESPIRATION RATE: 15 BRPM | OXYGEN SATURATION: 100 % | TEMPERATURE: 98 F | BODY MASS INDEX: 27.06 KG/M2 | HEART RATE: 68 BPM | DIASTOLIC BLOOD PRESSURE: 65 MMHG

## 2022-01-01 VITALS
RESPIRATION RATE: 16 BRPM | BODY MASS INDEX: 31.48 KG/M2 | SYSTOLIC BLOOD PRESSURE: 125 MMHG | WEIGHT: 224.88 LBS | HEIGHT: 71 IN | TEMPERATURE: 98 F | DIASTOLIC BLOOD PRESSURE: 65 MMHG | OXYGEN SATURATION: 94 % | HEART RATE: 68 BPM

## 2022-01-01 DIAGNOSIS — R53.1 WEAKNESS: ICD-10-CM

## 2022-01-01 DIAGNOSIS — Z94.0 HISTORY OF KIDNEY TRANSPLANT: ICD-10-CM

## 2022-01-01 DIAGNOSIS — R07.9 CHEST PAIN: ICD-10-CM

## 2022-01-01 DIAGNOSIS — I73.9 PAD (PERIPHERAL ARTERY DISEASE): ICD-10-CM

## 2022-01-01 DIAGNOSIS — Z79.899 IMMUNOCOMPROMISED STATE DUE TO DRUG THERAPY: ICD-10-CM

## 2022-01-01 DIAGNOSIS — L89.222 PRESSURE INJURY OF LEFT HIP, STAGE 2: ICD-10-CM

## 2022-01-01 DIAGNOSIS — S82.402D CLOSED FRACTURE OF LEFT TIBIA AND FIBULA WITH ROUTINE HEALING, SUBSEQUENT ENCOUNTER: ICD-10-CM

## 2022-01-01 DIAGNOSIS — M86.9 OSTEOMYELITIS OF LEFT ANKLE, UNSPECIFIED TYPE: Primary | ICD-10-CM

## 2022-01-01 DIAGNOSIS — S82.402A CLOSED FRACTURE OF LEFT TIBIA AND FIBULA, INITIAL ENCOUNTER: Primary | ICD-10-CM

## 2022-01-01 DIAGNOSIS — Z94.0 KIDNEY TRANSPLANT RECIPIENT: ICD-10-CM

## 2022-01-01 DIAGNOSIS — S82.402A CLOSED FRACTURE OF LEFT TIBIA AND FIBULA: ICD-10-CM

## 2022-01-01 DIAGNOSIS — S82.202A CLOSED FRACTURE OF LEFT TIBIA AND FIBULA, INITIAL ENCOUNTER: Primary | ICD-10-CM
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
## 2022-01-01 DIAGNOSIS — S82.202A FRACTURE TIBIA/FIBULA, LEFT, CLOSED, INITIAL ENCOUNTER: ICD-10-CM

## 2022-01-01 DIAGNOSIS — I25.10 CORONARY ARTERY DISEASE: ICD-10-CM

## 2022-01-01 DIAGNOSIS — Z94.0 S/P KIDNEY TRANSPLANT: Chronic | ICD-10-CM

## 2022-01-01 DIAGNOSIS — R06.09 DOE (DYSPNEA ON EXERTION): ICD-10-CM

## 2022-01-01 DIAGNOSIS — E11.65 TYPE 2 DIABETES MELLITUS WITH HYPERGLYCEMIA, WITH LONG-TERM CURRENT USE OF INSULIN: ICD-10-CM

## 2022-01-01 DIAGNOSIS — E11.628 DIABETIC FOOT INFECTION: ICD-10-CM

## 2022-01-01 DIAGNOSIS — N17.9 ACUTE RENAL FAILURE, UNSPECIFIED ACUTE RENAL FAILURE TYPE: Primary | ICD-10-CM

## 2022-01-01 DIAGNOSIS — Z01.818 PREOP EXAMINATION: ICD-10-CM

## 2022-01-01 DIAGNOSIS — Z89.511 HX OF RIGHT BKA: ICD-10-CM

## 2022-01-01 DIAGNOSIS — S82.202D CLOSED FRACTURE OF LEFT TIBIA AND FIBULA WITH ROUTINE HEALING, SUBSEQUENT ENCOUNTER: ICD-10-CM

## 2022-01-01 DIAGNOSIS — M86.172 ACUTE OSTEOMYELITIS OF LEFT CALCANEUS: ICD-10-CM

## 2022-01-01 DIAGNOSIS — R00.1 BRADYCARDIA: ICD-10-CM

## 2022-01-01 DIAGNOSIS — T84.7XXD WOUND INFECTION COMPLICATING HARDWARE, SUBSEQUENT ENCOUNTER: ICD-10-CM

## 2022-01-01 DIAGNOSIS — Z98.890 STATUS POST INCISION AND DRAINAGE: ICD-10-CM

## 2022-01-01 DIAGNOSIS — T81.42XA INFECTION OF DEEP INCISIONAL SURGICAL SITE AFTER PROCEDURE, INITIAL ENCOUNTER: ICD-10-CM

## 2022-01-01 DIAGNOSIS — L08.9 DIABETIC FOOT INFECTION: ICD-10-CM

## 2022-01-01 DIAGNOSIS — Z79.4 TYPE 2 DIABETES MELLITUS WITH HYPERGLYCEMIA, WITH LONG-TERM CURRENT USE OF INSULIN: ICD-10-CM

## 2022-01-01 DIAGNOSIS — R09.02 HYPOXIA: ICD-10-CM

## 2022-01-01 DIAGNOSIS — S82.202A CLOSED FRACTURE OF LEFT TIBIA AND FIBULA: ICD-10-CM

## 2022-01-01 DIAGNOSIS — I95.9 HYPOTENSION, UNSPECIFIED HYPOTENSION TYPE: ICD-10-CM

## 2022-01-01 DIAGNOSIS — Z47.89 AFTERCARE INVOLVING REMOVAL OF EXTERNAL FIXATION DEVICE: ICD-10-CM

## 2022-01-01 DIAGNOSIS — U07.1 COVID-19 VIRUS DETECTED: ICD-10-CM

## 2022-01-01 DIAGNOSIS — U07.1 COVID-19 VIRUS INFECTION: ICD-10-CM

## 2022-01-01 DIAGNOSIS — Z89.512 HISTORY OF LEFT BELOW KNEE AMPUTATION: Primary | ICD-10-CM

## 2022-01-01 DIAGNOSIS — G47.33 OSA (OBSTRUCTIVE SLEEP APNEA): ICD-10-CM

## 2022-01-01 DIAGNOSIS — D84.821 IMMUNOCOMPROMISED STATE DUE TO DRUG THERAPY: ICD-10-CM

## 2022-01-01 DIAGNOSIS — Z89.512 S/P BKA (BELOW KNEE AMPUTATION), LEFT: ICD-10-CM

## 2022-01-01 DIAGNOSIS — E87.1 HYPONATREMIA: ICD-10-CM

## 2022-01-01 DIAGNOSIS — I73.9 PVD (PERIPHERAL VASCULAR DISEASE): ICD-10-CM

## 2022-01-01 DIAGNOSIS — G89.18 POST-OPERATIVE PAIN: ICD-10-CM

## 2022-01-01 DIAGNOSIS — S82.402A FRACTURE TIBIA/FIBULA, LEFT, CLOSED, INITIAL ENCOUNTER: ICD-10-CM

## 2022-01-01 LAB
ABO + RH BLD: NORMAL
ACANTHOCYTES BLD QL SMEAR: PRESENT
ACID FAST MOD KINY STN SPEC: NORMAL
ALBUMIN SERPL BCP-MCNC: 1.6 G/DL (ref 3.5–5.2)
ALBUMIN SERPL BCP-MCNC: 1.8 G/DL (ref 3.5–5.2)
ALBUMIN SERPL BCP-MCNC: 1.9 G/DL (ref 3.5–5.2)
ALBUMIN SERPL BCP-MCNC: 2 G/DL (ref 3.5–5.2)
ALBUMIN SERPL BCP-MCNC: 2.1 G/DL (ref 3.5–5.2)
ALBUMIN SERPL BCP-MCNC: 2.2 G/DL (ref 3.5–5.2)
ALBUMIN SERPL BCP-MCNC: 2.2 G/DL (ref 3.5–5.2)
ALBUMIN SERPL BCP-MCNC: 2.3 G/DL (ref 3.5–5.2)
ALBUMIN SERPL BCP-MCNC: 2.5 G/DL (ref 3.5–5.2)
ALBUMIN SERPL BCP-MCNC: 2.6 G/DL (ref 3.5–5.2)
ALBUMIN SERPL BCP-MCNC: 3 G/DL (ref 3.5–5.2)
ALLENS TEST: ABNORMAL
ALP SERPL-CCNC: 100 U/L (ref 55–135)
ALP SERPL-CCNC: 102 U/L (ref 55–135)
ALP SERPL-CCNC: 112 U/L (ref 55–135)
ALP SERPL-CCNC: 120 U/L (ref 55–135)
ALP SERPL-CCNC: 121 U/L (ref 55–135)
ALP SERPL-CCNC: 124 U/L (ref 55–135)
ALP SERPL-CCNC: 126 U/L (ref 55–135)
ALP SERPL-CCNC: 132 U/L (ref 55–135)
ALP SERPL-CCNC: 133 U/L (ref 55–135)
ALP SERPL-CCNC: 140 U/L (ref 55–135)
ALP SERPL-CCNC: 142 U/L (ref 55–135)
ALP SERPL-CCNC: 156 U/L (ref 55–135)
ALP SERPL-CCNC: 156 U/L (ref 55–135)
ALP SERPL-CCNC: 157 U/L (ref 55–135)
ALP SERPL-CCNC: 164 U/L (ref 55–135)
ALP SERPL-CCNC: 174 U/L (ref 55–135)
ALP SERPL-CCNC: 184 U/L (ref 55–135)
ALP SERPL-CCNC: 190 U/L (ref 55–135)
ALP SERPL-CCNC: 230 U/L (ref 55–135)
ALP SERPL-CCNC: 234 U/L (ref 55–135)
ALP SERPL-CCNC: 275 U/L (ref 55–135)
ALP SERPL-CCNC: 280 U/L (ref 55–135)
ALP SERPL-CCNC: 402 U/L (ref 55–135)
ALP SERPL-CCNC: 419 U/L (ref 55–135)
ALP SERPL-CCNC: 96 U/L (ref 55–135)
ALT SERPL W/O P-5'-P-CCNC: 105 U/L (ref 10–44)
ALT SERPL W/O P-5'-P-CCNC: 11 U/L (ref 10–44)
ALT SERPL W/O P-5'-P-CCNC: 13 U/L (ref 10–44)
ALT SERPL W/O P-5'-P-CCNC: 14 U/L (ref 10–44)
ALT SERPL W/O P-5'-P-CCNC: 15 U/L (ref 10–44)
ALT SERPL W/O P-5'-P-CCNC: 16 U/L (ref 10–44)
ALT SERPL W/O P-5'-P-CCNC: 18 U/L (ref 10–44)
ALT SERPL W/O P-5'-P-CCNC: 19 U/L (ref 10–44)
ALT SERPL W/O P-5'-P-CCNC: 19 U/L (ref 10–44)
ALT SERPL W/O P-5'-P-CCNC: 23 U/L (ref 10–44)
ALT SERPL W/O P-5'-P-CCNC: 32 U/L (ref 10–44)
ALT SERPL W/O P-5'-P-CCNC: 32 U/L (ref 10–44)
ALT SERPL W/O P-5'-P-CCNC: 42 U/L (ref 10–44)
ALT SERPL W/O P-5'-P-CCNC: 44 U/L (ref 10–44)
ALT SERPL W/O P-5'-P-CCNC: 48 U/L (ref 10–44)
ALT SERPL W/O P-5'-P-CCNC: 6 U/L (ref 10–44)
ALT SERPL W/O P-5'-P-CCNC: 7 U/L (ref 10–44)
ALT SERPL W/O P-5'-P-CCNC: 73 U/L (ref 10–44)
ALT SERPL W/O P-5'-P-CCNC: 8 U/L (ref 10–44)
ALT SERPL W/O P-5'-P-CCNC: 9 U/L (ref 10–44)
ALT SERPL W/O P-5'-P-CCNC: 9 U/L (ref 10–44)
ALT SERPL W/O P-5'-P-CCNC: 94 U/L (ref 10–44)
ALT SERPL W/O P-5'-P-CCNC: <5 U/L (ref 10–44)
ANION GAP SERPL CALC-SCNC: 10 MMOL/L (ref 8–16)
ANION GAP SERPL CALC-SCNC: 11 MMOL/L (ref 8–16)
ANION GAP SERPL CALC-SCNC: 12 MMOL/L (ref 8–16)
ANION GAP SERPL CALC-SCNC: 14 MMOL/L (ref 8–16)
ANION GAP SERPL CALC-SCNC: 15 MMOL/L (ref 8–16)
ANION GAP SERPL CALC-SCNC: 15 MMOL/L (ref 8–16)
ANION GAP SERPL CALC-SCNC: 16 MMOL/L (ref 8–16)
ANION GAP SERPL CALC-SCNC: 7 MMOL/L (ref 8–16)
ANION GAP SERPL CALC-SCNC: 8 MMOL/L (ref 8–16)
ANION GAP SERPL CALC-SCNC: 9 MMOL/L (ref 8–16)
ANISOCYTOSIS BLD QL SMEAR: SLIGHT
ANISOCYTOSIS BLD QL SMEAR: SLIGHT
AORTIC ROOT ANNULUS: 3.36 CM
APTT BLDCRRT: 52.8 SEC (ref 21–32)
ASCENDING AORTA: 3.28 CM
AST SERPL-CCNC: 10 U/L (ref 10–40)
AST SERPL-CCNC: 127 U/L (ref 10–40)
AST SERPL-CCNC: 13 U/L (ref 10–40)
AST SERPL-CCNC: 13 U/L (ref 10–40)
AST SERPL-CCNC: 14 U/L (ref 10–40)
AST SERPL-CCNC: 14 U/L (ref 10–40)
AST SERPL-CCNC: 15 U/L (ref 10–40)
AST SERPL-CCNC: 16 U/L (ref 10–40)
AST SERPL-CCNC: 200 U/L (ref 10–40)
AST SERPL-CCNC: 21 U/L (ref 10–40)
AST SERPL-CCNC: 230 U/L (ref 10–40)
AST SERPL-CCNC: 24 U/L (ref 10–40)
AST SERPL-CCNC: 24 U/L (ref 10–40)
AST SERPL-CCNC: 35 U/L (ref 10–40)
AST SERPL-CCNC: 40 U/L (ref 10–40)
AST SERPL-CCNC: 5 U/L (ref 10–40)
AST SERPL-CCNC: 55 U/L (ref 10–40)
AST SERPL-CCNC: 6 U/L (ref 10–40)
AST SERPL-CCNC: 66 U/L (ref 10–40)
AST SERPL-CCNC: 75 U/L (ref 10–40)
AV INDEX (PROSTH): 0.75
AV MEAN GRADIENT: 3 MMHG
AV PEAK GRADIENT: 5 MMHG
AV VALVE AREA: 2.8 CM2
AV VELOCITY RATIO: 0.75
BACTERIA #/AREA URNS HPF: ABNORMAL /HPF
BACTERIA #/AREA URNS HPF: ABNORMAL /HPF
BACTERIA BLD CULT: NORMAL
BACTERIA SPEC AEROBE CULT: ABNORMAL
BACTERIA SPEC ANAEROBE CULT: ABNORMAL
BASOPHILS # BLD AUTO: 0 K/UL (ref 0–0.2)
BASOPHILS # BLD AUTO: 0.01 K/UL (ref 0–0.2)
BASOPHILS # BLD AUTO: 0.02 K/UL (ref 0–0.2)
BASOPHILS # BLD AUTO: 0.03 K/UL (ref 0–0.2)
BASOPHILS # BLD AUTO: 0.04 K/UL (ref 0–0.2)
BASOPHILS # BLD AUTO: 0.04 K/UL (ref 0–0.2)
BASOPHILS # BLD AUTO: 0.05 K/UL (ref 0–0.2)
BASOPHILS # BLD AUTO: ABNORMAL K/UL (ref 0–0.2)
BASOPHILS NFR BLD: 0 % (ref 0–1.9)
BASOPHILS NFR BLD: 0.1 % (ref 0–1.9)
BASOPHILS NFR BLD: 0.2 % (ref 0–1.9)
BASOPHILS NFR BLD: 0.3 % (ref 0–1.9)
BASOPHILS NFR BLD: 0.4 % (ref 0–1.9)
BASOPHILS NFR BLD: 0.5 % (ref 0–1.9)
BASOPHILS NFR BLD: 0.6 % (ref 0–1.9)
BILIRUB SERPL-MCNC: 0.3 MG/DL (ref 0.1–1)
BILIRUB SERPL-MCNC: 0.5 MG/DL (ref 0.1–1)
BILIRUB SERPL-MCNC: 0.7 MG/DL (ref 0.1–1)
BILIRUB SERPL-MCNC: 0.8 MG/DL (ref 0.1–1)
BILIRUB SERPL-MCNC: 0.9 MG/DL (ref 0.1–1)
BILIRUB SERPL-MCNC: 0.9 MG/DL (ref 0.1–1)
BILIRUB SERPL-MCNC: 1.3 MG/DL (ref 0.1–1)
BILIRUB UR QL STRIP: NEGATIVE
BILIRUB UR QL STRIP: NEGATIVE
BLD GP AB SCN CELLS X3 SERPL QL: NORMAL
BLD PROD TYP BPU: NORMAL
BLOOD UNIT EXPIRATION DATE: NORMAL
BLOOD UNIT TYPE CODE: 5100
BLOOD UNIT TYPE: NORMAL
BNP SERPL-MCNC: 288 PG/ML (ref 0–99)
BNP SERPL-MCNC: 667 PG/ML (ref 0–99)
BSA FOR ECHO PROCEDURE: 2.27 M2
BUN SERPL-MCNC: 11 MG/DL (ref 8–23)
BUN SERPL-MCNC: 12 MG/DL (ref 8–23)
BUN SERPL-MCNC: 14 MG/DL (ref 8–23)
BUN SERPL-MCNC: 15 MG/DL (ref 8–23)
BUN SERPL-MCNC: 15 MG/DL (ref 8–23)
BUN SERPL-MCNC: 16 MG/DL (ref 8–23)
BUN SERPL-MCNC: 17 MG/DL (ref 8–23)
BUN SERPL-MCNC: 20 MG/DL (ref 8–23)
BUN SERPL-MCNC: 21 MG/DL (ref 8–23)
BUN SERPL-MCNC: 22 MG/DL (ref 8–23)
BUN SERPL-MCNC: 23 MG/DL (ref 8–23)
BUN SERPL-MCNC: 24 MG/DL (ref 8–23)
BUN SERPL-MCNC: 25 MG/DL (ref 8–23)
BUN SERPL-MCNC: 25 MG/DL (ref 8–23)
BUN SERPL-MCNC: 26 MG/DL (ref 8–23)
BUN SERPL-MCNC: 28 MG/DL (ref 8–23)
BUN SERPL-MCNC: 29 MG/DL (ref 8–23)
BUN SERPL-MCNC: 29 MG/DL (ref 8–23)
BUN SERPL-MCNC: 30 MG/DL (ref 8–23)
BUN SERPL-MCNC: 30 MG/DL (ref 8–23)
BUN SERPL-MCNC: 33 MG/DL (ref 8–23)
BUN SERPL-MCNC: 33 MG/DL (ref 8–23)
BUN SERPL-MCNC: 35 MG/DL (ref 8–23)
BUN SERPL-MCNC: 36 MG/DL (ref 8–23)
BUN SERPL-MCNC: 37 MG/DL (ref 8–23)
BUN SERPL-MCNC: 38 MG/DL (ref 8–23)
BUN SERPL-MCNC: 38 MG/DL (ref 8–23)
BUN SERPL-MCNC: 41 MG/DL (ref 8–23)
BUN SERPL-MCNC: 43 MG/DL (ref 8–23)
BUN SERPL-MCNC: 44 MG/DL (ref 8–23)
BUN SERPL-MCNC: 52 MG/DL (ref 8–23)
BUN SERPL-MCNC: 59 MG/DL (ref 8–23)
BUN SERPL-MCNC: 66 MG/DL (ref 8–23)
CALCIUM SERPL-MCNC: 10.1 MG/DL (ref 8.7–10.5)
CALCIUM SERPL-MCNC: 7 MG/DL (ref 8.7–10.5)
CALCIUM SERPL-MCNC: 7 MG/DL (ref 8.7–10.5)
CALCIUM SERPL-MCNC: 7.2 MG/DL (ref 8.7–10.5)
CALCIUM SERPL-MCNC: 7.4 MG/DL (ref 8.7–10.5)
CALCIUM SERPL-MCNC: 7.7 MG/DL (ref 8.7–10.5)
CALCIUM SERPL-MCNC: 7.8 MG/DL (ref 8.7–10.5)
CALCIUM SERPL-MCNC: 7.9 MG/DL (ref 8.7–10.5)
CALCIUM SERPL-MCNC: 7.9 MG/DL (ref 8.7–10.5)
CALCIUM SERPL-MCNC: 8 MG/DL (ref 8.7–10.5)
CALCIUM SERPL-MCNC: 8.1 MG/DL (ref 8.7–10.5)
CALCIUM SERPL-MCNC: 8.2 MG/DL (ref 8.7–10.5)
CALCIUM SERPL-MCNC: 8.3 MG/DL (ref 8.7–10.5)
CALCIUM SERPL-MCNC: 8.4 MG/DL (ref 8.7–10.5)
CALCIUM SERPL-MCNC: 8.4 MG/DL (ref 8.7–10.5)
CALCIUM SERPL-MCNC: 8.5 MG/DL (ref 8.7–10.5)
CALCIUM SERPL-MCNC: 8.6 MG/DL (ref 8.7–10.5)
CALCIUM SERPL-MCNC: 8.7 MG/DL (ref 8.7–10.5)
CALCIUM SERPL-MCNC: 8.7 MG/DL (ref 8.7–10.5)
CALCIUM SERPL-MCNC: 8.8 MG/DL (ref 8.7–10.5)
CALCIUM SERPL-MCNC: 8.9 MG/DL (ref 8.7–10.5)
CALCIUM SERPL-MCNC: 8.9 MG/DL (ref 8.7–10.5)
CALCIUM SERPL-MCNC: 9 MG/DL (ref 8.7–10.5)
CALCIUM SERPL-MCNC: 9.1 MG/DL (ref 8.7–10.5)
CALCIUM SERPL-MCNC: 9.3 MG/DL (ref 8.7–10.5)
CALCIUM SERPL-MCNC: 9.4 MG/DL (ref 8.7–10.5)
CHLORIDE SERPL-SCNC: 100 MMOL/L (ref 95–110)
CHLORIDE SERPL-SCNC: 101 MMOL/L (ref 95–110)
CHLORIDE SERPL-SCNC: 102 MMOL/L (ref 95–110)
CHLORIDE SERPL-SCNC: 103 MMOL/L (ref 95–110)
CHLORIDE SERPL-SCNC: 104 MMOL/L (ref 95–110)
CHLORIDE SERPL-SCNC: 105 MMOL/L (ref 95–110)
CHLORIDE SERPL-SCNC: 106 MMOL/L (ref 95–110)
CHLORIDE SERPL-SCNC: 107 MMOL/L (ref 95–110)
CHLORIDE SERPL-SCNC: 108 MMOL/L (ref 95–110)
CHLORIDE SERPL-SCNC: 108 MMOL/L (ref 95–110)
CHLORIDE SERPL-SCNC: 109 MMOL/L (ref 95–110)
CHLORIDE SERPL-SCNC: 110 MMOL/L (ref 95–110)
CHLORIDE SERPL-SCNC: 112 MMOL/L (ref 95–110)
CHLORIDE SERPL-SCNC: 117 MMOL/L (ref 95–110)
CHLORIDE SERPL-SCNC: 95 MMOL/L (ref 95–110)
CHLORIDE SERPL-SCNC: 95 MMOL/L (ref 95–110)
CHLORIDE SERPL-SCNC: 96 MMOL/L (ref 95–110)
CHLORIDE SERPL-SCNC: 96 MMOL/L (ref 95–110)
CHLORIDE SERPL-SCNC: 97 MMOL/L (ref 95–110)
CHLORIDE SERPL-SCNC: 97 MMOL/L (ref 95–110)
CHLORIDE SERPL-SCNC: 99 MMOL/L (ref 95–110)
CHLORIDE SERPL-SCNC: 99 MMOL/L (ref 95–110)
CK SERPL-CCNC: 13 U/L (ref 20–200)
CK SERPL-CCNC: 46 U/L (ref 20–200)
CLARITY UR: CLEAR
CLARITY UR: CLEAR
CO2 SERPL-SCNC: 10 MMOL/L (ref 23–29)
CO2 SERPL-SCNC: 12 MMOL/L (ref 23–29)
CO2 SERPL-SCNC: 13 MMOL/L (ref 23–29)
CO2 SERPL-SCNC: 14 MMOL/L (ref 23–29)
CO2 SERPL-SCNC: 15 MMOL/L (ref 23–29)
CO2 SERPL-SCNC: 15 MMOL/L (ref 23–29)
CO2 SERPL-SCNC: 16 MMOL/L (ref 23–29)
CO2 SERPL-SCNC: 17 MMOL/L (ref 23–29)
CO2 SERPL-SCNC: 18 MMOL/L (ref 23–29)
CO2 SERPL-SCNC: 19 MMOL/L (ref 23–29)
CO2 SERPL-SCNC: 20 MMOL/L (ref 23–29)
CO2 SERPL-SCNC: 20 MMOL/L (ref 23–29)
CO2 SERPL-SCNC: 21 MMOL/L (ref 23–29)
CO2 SERPL-SCNC: 21 MMOL/L (ref 23–29)
CO2 SERPL-SCNC: 22 MMOL/L (ref 23–29)
CO2 SERPL-SCNC: 22 MMOL/L (ref 23–29)
CO2 SERPL-SCNC: 23 MMOL/L (ref 23–29)
CO2 SERPL-SCNC: 24 MMOL/L (ref 23–29)
CO2 SERPL-SCNC: 25 MMOL/L (ref 23–29)
CO2 SERPL-SCNC: 25 MMOL/L (ref 23–29)
CO2 SERPL-SCNC: 26 MMOL/L (ref 23–29)
CO2 SERPL-SCNC: 27 MMOL/L (ref 23–29)
CO2 SERPL-SCNC: 27 MMOL/L (ref 23–29)
CODING SYSTEM: NORMAL
COLOR UR: YELLOW
COLOR UR: YELLOW
CORTIS SERPL-MCNC: 15.5 UG/DL
CREAT SERPL-MCNC: 1 MG/DL (ref 0.5–1.4)
CREAT SERPL-MCNC: 1.1 MG/DL (ref 0.5–1.4)
CREAT SERPL-MCNC: 1.2 MG/DL (ref 0.5–1.4)
CREAT SERPL-MCNC: 1.3 MG/DL (ref 0.5–1.4)
CREAT SERPL-MCNC: 1.4 MG/DL (ref 0.5–1.4)
CREAT SERPL-MCNC: 1.5 MG/DL (ref 0.5–1.4)
CREAT SERPL-MCNC: 1.5 MG/DL (ref 0.5–1.4)
CREAT SERPL-MCNC: 1.6 MG/DL (ref 0.5–1.4)
CREAT SERPL-MCNC: 1.7 MG/DL (ref 0.5–1.4)
CREAT SERPL-MCNC: 1.8 MG/DL (ref 0.5–1.4)
CREAT SERPL-MCNC: 1.8 MG/DL (ref 0.5–1.4)
CREAT SERPL-MCNC: 1.9 MG/DL (ref 0.5–1.4)
CREAT SERPL-MCNC: 2 MG/DL (ref 0.5–1.4)
CREAT SERPL-MCNC: 2.1 MG/DL (ref 0.5–1.4)
CREAT SERPL-MCNC: 2.3 MG/DL (ref 0.5–1.4)
CREAT SERPL-MCNC: 2.3 MG/DL (ref 0.5–1.4)
CREAT SERPL-MCNC: 2.6 MG/DL (ref 0.5–1.4)
CREAT SERPL-MCNC: 2.8 MG/DL (ref 0.5–1.4)
CREAT SERPL-MCNC: 3 MG/DL (ref 0.5–1.4)
CREAT SERPL-MCNC: 3.5 MG/DL (ref 0.5–1.4)
CREAT SERPL-MCNC: 3.6 MG/DL (ref 0.5–1.4)
CREAT SERPL-MCNC: 3.6 MG/DL (ref 0.5–1.4)
CREAT SERPL-MCNC: 3.7 MG/DL (ref 0.5–1.4)
CREAT SERPL-MCNC: 4 MG/DL (ref 0.5–1.4)
CREAT SERPL-MCNC: 4.4 MG/DL (ref 0.5–1.4)
CREAT SERPL-MCNC: 4.4 MG/DL (ref 0.5–1.4)
CREAT SERPL-MCNC: 4.5 MG/DL (ref 0.5–1.4)
CREAT SERPL-MCNC: 4.5 MG/DL (ref 0.5–1.4)
CRP SERPL-MCNC: 191.3 MG/L (ref 0–8.2)
CV ECHO LV RWT: 0.96 CM
D DIMER PPP IA.FEU-MCNC: 0.53 MG/L FEU
DELSYS: ABNORMAL
DIFFERENTIAL METHOD: ABNORMAL
DISPENSE STATUS: NORMAL
DOP CALC AO PEAK VEL: 1.12 M/S
DOP CALC AO VTI: 24.8 CM
DOP CALC LVOT AREA: 3.7 CM2
DOP CALC LVOT DIAMETER: 2.18 CM
DOP CALC LVOT PEAK VEL: 0.84 M/S
DOP CALC LVOT STROKE VOLUME: 69.39 CM3
DOP CALC RVOT PEAK VEL: 0.91 M/S
DOP CALC RVOT VTI: 18.1 CM
DOP CALCLVOT PEAK VEL VTI: 18.6 CM
E WAVE DECELERATION TIME: 215.05 MSEC
E/A RATIO: 0.81
E/E' RATIO: 9.3 M/S
ECHO LV POSTERIOR WALL: 1.78 CM (ref 0.6–1.1)
EJECTION FRACTION: 60 %
EOSINOPHIL # BLD AUTO: 0 K/UL (ref 0–0.5)
EOSINOPHIL # BLD AUTO: 0.1 K/UL (ref 0–0.5)
EOSINOPHIL # BLD AUTO: 0.2 K/UL (ref 0–0.5)
EOSINOPHIL # BLD AUTO: ABNORMAL K/UL (ref 0–0.5)
EOSINOPHIL NFR BLD: 0 % (ref 0–8)
EOSINOPHIL NFR BLD: 0.1 % (ref 0–8)
EOSINOPHIL NFR BLD: 0.2 % (ref 0–8)
EOSINOPHIL NFR BLD: 0.2 % (ref 0–8)
EOSINOPHIL NFR BLD: 0.3 % (ref 0–8)
EOSINOPHIL NFR BLD: 0.3 % (ref 0–8)
EOSINOPHIL NFR BLD: 0.4 % (ref 0–8)
EOSINOPHIL NFR BLD: 0.5 % (ref 0–8)
EOSINOPHIL NFR BLD: 0.8 % (ref 0–8)
EOSINOPHIL NFR BLD: 0.9 % (ref 0–8)
EOSINOPHIL NFR BLD: 1 % (ref 0–8)
EOSINOPHIL NFR BLD: 1 % (ref 0–8)
EOSINOPHIL NFR BLD: 1.2 % (ref 0–8)
EOSINOPHIL NFR BLD: 1.4 % (ref 0–8)
EOSINOPHIL NFR BLD: 1.5 % (ref 0–8)
EOSINOPHIL NFR BLD: 1.5 % (ref 0–8)
EOSINOPHIL NFR BLD: 1.8 % (ref 0–8)
EOSINOPHIL NFR BLD: 1.9 % (ref 0–8)
EOSINOPHIL NFR BLD: 2 % (ref 0–8)
EOSINOPHIL NFR BLD: 2.2 % (ref 0–8)
EOSINOPHIL NFR BLD: 2.5 % (ref 0–8)
EOSINOPHIL NFR BLD: 2.5 % (ref 0–8)
EOSINOPHIL NFR BLD: 3.4 % (ref 0–8)
ERYTHROCYTE [DISTWIDTH] IN BLOOD BY AUTOMATED COUNT: 13.3 % (ref 11.5–14.5)
ERYTHROCYTE [DISTWIDTH] IN BLOOD BY AUTOMATED COUNT: 13.4 % (ref 11.5–14.5)
ERYTHROCYTE [DISTWIDTH] IN BLOOD BY AUTOMATED COUNT: 13.5 % (ref 11.5–14.5)
ERYTHROCYTE [DISTWIDTH] IN BLOOD BY AUTOMATED COUNT: 13.5 % (ref 11.5–14.5)
ERYTHROCYTE [DISTWIDTH] IN BLOOD BY AUTOMATED COUNT: 13.7 % (ref 11.5–14.5)
ERYTHROCYTE [DISTWIDTH] IN BLOOD BY AUTOMATED COUNT: 13.7 % (ref 11.5–14.5)
ERYTHROCYTE [DISTWIDTH] IN BLOOD BY AUTOMATED COUNT: 14.1 % (ref 11.5–14.5)
ERYTHROCYTE [DISTWIDTH] IN BLOOD BY AUTOMATED COUNT: 14.1 % (ref 11.5–14.5)
ERYTHROCYTE [DISTWIDTH] IN BLOOD BY AUTOMATED COUNT: 14.2 % (ref 11.5–14.5)
ERYTHROCYTE [DISTWIDTH] IN BLOOD BY AUTOMATED COUNT: 14.4 % (ref 11.5–14.5)
ERYTHROCYTE [DISTWIDTH] IN BLOOD BY AUTOMATED COUNT: 14.4 % (ref 11.5–14.5)
ERYTHROCYTE [DISTWIDTH] IN BLOOD BY AUTOMATED COUNT: 14.6 % (ref 11.5–14.5)
ERYTHROCYTE [DISTWIDTH] IN BLOOD BY AUTOMATED COUNT: 14.7 % (ref 11.5–14.5)
ERYTHROCYTE [DISTWIDTH] IN BLOOD BY AUTOMATED COUNT: 15 % (ref 11.5–14.5)
ERYTHROCYTE [DISTWIDTH] IN BLOOD BY AUTOMATED COUNT: 15 % (ref 11.5–14.5)
ERYTHROCYTE [DISTWIDTH] IN BLOOD BY AUTOMATED COUNT: 15.1 % (ref 11.5–14.5)
ERYTHROCYTE [DISTWIDTH] IN BLOOD BY AUTOMATED COUNT: 15.6 % (ref 11.5–14.5)
ERYTHROCYTE [DISTWIDTH] IN BLOOD BY AUTOMATED COUNT: 15.8 % (ref 11.5–14.5)
ERYTHROCYTE [DISTWIDTH] IN BLOOD BY AUTOMATED COUNT: 15.9 % (ref 11.5–14.5)
ERYTHROCYTE [DISTWIDTH] IN BLOOD BY AUTOMATED COUNT: 15.9 % (ref 11.5–14.5)
ERYTHROCYTE [DISTWIDTH] IN BLOOD BY AUTOMATED COUNT: 16 % (ref 11.5–14.5)
ERYTHROCYTE [DISTWIDTH] IN BLOOD BY AUTOMATED COUNT: 16.1 % (ref 11.5–14.5)
ERYTHROCYTE [DISTWIDTH] IN BLOOD BY AUTOMATED COUNT: 16.1 % (ref 11.5–14.5)
ERYTHROCYTE [DISTWIDTH] IN BLOOD BY AUTOMATED COUNT: 16.2 % (ref 11.5–14.5)
ERYTHROCYTE [DISTWIDTH] IN BLOOD BY AUTOMATED COUNT: 16.3 % (ref 11.5–14.5)
ERYTHROCYTE [DISTWIDTH] IN BLOOD BY AUTOMATED COUNT: 16.4 % (ref 11.5–14.5)
ERYTHROCYTE [DISTWIDTH] IN BLOOD BY AUTOMATED COUNT: 16.4 % (ref 11.5–14.5)
ERYTHROCYTE [DISTWIDTH] IN BLOOD BY AUTOMATED COUNT: 16.5 % (ref 11.5–14.5)
ERYTHROCYTE [DISTWIDTH] IN BLOOD BY AUTOMATED COUNT: 16.6 % (ref 11.5–14.5)
ERYTHROCYTE [DISTWIDTH] IN BLOOD BY AUTOMATED COUNT: 16.9 % (ref 11.5–14.5)
ERYTHROCYTE [DISTWIDTH] IN BLOOD BY AUTOMATED COUNT: 17 % (ref 11.5–14.5)
ERYTHROCYTE [DISTWIDTH] IN BLOOD BY AUTOMATED COUNT: 17 % (ref 11.5–14.5)
ERYTHROCYTE [DISTWIDTH] IN BLOOD BY AUTOMATED COUNT: 17.2 % (ref 11.5–14.5)
ERYTHROCYTE [DISTWIDTH] IN BLOOD BY AUTOMATED COUNT: 17.3 % (ref 11.5–14.5)
ERYTHROCYTE [DISTWIDTH] IN BLOOD BY AUTOMATED COUNT: 17.5 % (ref 11.5–14.5)
ERYTHROCYTE [DISTWIDTH] IN BLOOD BY AUTOMATED COUNT: 17.6 % (ref 11.5–14.5)
ERYTHROCYTE [SEDIMENTATION RATE] IN BLOOD BY WESTERGREN METHOD: 62 MM/HR (ref 0–10)
EST. GFR  (NO RACE VARIABLE): 13 ML/MIN/1.73 M^2
EST. GFR  (NO RACE VARIABLE): 13 ML/MIN/1.73 M^2
EST. GFR  (NO RACE VARIABLE): 14 ML/MIN/1.73 M^2
EST. GFR  (NO RACE VARIABLE): 14 ML/MIN/1.73 M^2
EST. GFR  (NO RACE VARIABLE): 15 ML/MIN/1.73 M^2
EST. GFR  (NO RACE VARIABLE): 17 ML/MIN/1.73 M^2
EST. GFR  (NO RACE VARIABLE): 18 ML/MIN/1.73 M^2
EST. GFR  (NO RACE VARIABLE): 22 ML/MIN/1.73 M^2
EST. GFR  (NO RACE VARIABLE): 24 ML/MIN/1.73 M^2
EST. GFR  (NO RACE VARIABLE): 26 ML/MIN/1.73 M^2
EST. GFR  (NO RACE VARIABLE): 30 ML/MIN/1.73 M^2
EST. GFR  (NO RACE VARIABLE): 30 ML/MIN/1.73 M^2
EST. GFR  (NO RACE VARIABLE): 33 ML/MIN/1.73 M^2
EST. GFR  (NO RACE VARIABLE): 35 ML/MIN/1.73 M^2
EST. GFR  (NO RACE VARIABLE): 38 ML/MIN/1.73 M^2
EST. GFR  (NO RACE VARIABLE): 40 ML/MIN/1.73 M^2
EST. GFR  (NO RACE VARIABLE): 40 ML/MIN/1.73 M^2
EST. GFR  (NO RACE VARIABLE): 43 ML/MIN/1.73 M^2
EST. GFR  (NO RACE VARIABLE): 46 ML/MIN/1.73 M^2
EST. GFR  (NO RACE VARIABLE): 50 ML/MIN/1.73 M^2
EST. GFR  (NO RACE VARIABLE): 50 ML/MIN/1.73 M^2
EST. GFR  (NO RACE VARIABLE): 54 ML/MIN/1.73 M^2
EST. GFR  (NO RACE VARIABLE): 59 ML/MIN/1.73 M^2
EST. GFR  (NO RACE VARIABLE): >60 ML/MIN/1.73 M^2
ESTIMATED AVG GLUCOSE: 166 MG/DL (ref 68–131)
FERRITIN SERPL-MCNC: 349 NG/ML (ref 20–300)
FERRITIN SERPL-MCNC: 377 NG/ML (ref 20–300)
FINAL PATHOLOGIC DIAGNOSIS: NORMAL
FINAL PATHOLOGIC DIAGNOSIS: NORMAL
FLOW: 1
FOLATE SERPL-MCNC: 5.4 NG/ML (ref 4–24)
FRACTIONAL SHORTENING: 34 % (ref 28–44)
FUNGUS SPEC CULT: NORMAL
GLUCOSE SERPL-MCNC: 101 MG/DL (ref 70–110)
GLUCOSE SERPL-MCNC: 102 MG/DL (ref 70–110)
GLUCOSE SERPL-MCNC: 119 MG/DL (ref 70–110)
GLUCOSE SERPL-MCNC: 126 MG/DL (ref 70–110)
GLUCOSE SERPL-MCNC: 127 MG/DL (ref 70–110)
GLUCOSE SERPL-MCNC: 133 MG/DL (ref 70–110)
GLUCOSE SERPL-MCNC: 144 MG/DL (ref 70–110)
GLUCOSE SERPL-MCNC: 144 MG/DL (ref 70–110)
GLUCOSE SERPL-MCNC: 146 MG/DL (ref 70–110)
GLUCOSE SERPL-MCNC: 146 MG/DL (ref 70–110)
GLUCOSE SERPL-MCNC: 147 MG/DL (ref 70–110)
GLUCOSE SERPL-MCNC: 147 MG/DL (ref 70–110)
GLUCOSE SERPL-MCNC: 150 MG/DL (ref 70–110)
GLUCOSE SERPL-MCNC: 159 MG/DL (ref 70–110)
GLUCOSE SERPL-MCNC: 163 MG/DL (ref 70–110)
GLUCOSE SERPL-MCNC: 171 MG/DL (ref 70–110)
GLUCOSE SERPL-MCNC: 172 MG/DL (ref 70–110)
GLUCOSE SERPL-MCNC: 180 MG/DL (ref 70–110)
GLUCOSE SERPL-MCNC: 180 MG/DL (ref 70–110)
GLUCOSE SERPL-MCNC: 187 MG/DL (ref 70–110)
GLUCOSE SERPL-MCNC: 197 MG/DL (ref 70–110)
GLUCOSE SERPL-MCNC: 198 MG/DL (ref 70–110)
GLUCOSE SERPL-MCNC: 207 MG/DL (ref 70–110)
GLUCOSE SERPL-MCNC: 207 MG/DL (ref 70–110)
GLUCOSE SERPL-MCNC: 208 MG/DL (ref 70–110)
GLUCOSE SERPL-MCNC: 209 MG/DL (ref 70–110)
GLUCOSE SERPL-MCNC: 211 MG/DL (ref 70–110)
GLUCOSE SERPL-MCNC: 220 MG/DL (ref 70–110)
GLUCOSE SERPL-MCNC: 228 MG/DL (ref 70–110)
GLUCOSE SERPL-MCNC: 234 MG/DL (ref 70–110)
GLUCOSE SERPL-MCNC: 250 MG/DL (ref 70–110)
GLUCOSE SERPL-MCNC: 255 MG/DL (ref 70–110)
GLUCOSE SERPL-MCNC: 256 MG/DL (ref 70–110)
GLUCOSE SERPL-MCNC: 266 MG/DL (ref 70–110)
GLUCOSE SERPL-MCNC: 271 MG/DL (ref 70–110)
GLUCOSE SERPL-MCNC: 273 MG/DL (ref 70–110)
GLUCOSE SERPL-MCNC: 291 MG/DL (ref 70–110)
GLUCOSE SERPL-MCNC: 298 MG/DL (ref 70–110)
GLUCOSE SERPL-MCNC: 299 MG/DL (ref 70–110)
GLUCOSE SERPL-MCNC: 317 MG/DL (ref 70–110)
GLUCOSE SERPL-MCNC: 428 MG/DL (ref 70–110)
GLUCOSE SERPL-MCNC: 63 MG/DL (ref 70–110)
GLUCOSE SERPL-MCNC: 74 MG/DL (ref 70–110)
GLUCOSE SERPL-MCNC: 78 MG/DL (ref 70–110)
GLUCOSE SERPL-MCNC: 83 MG/DL (ref 70–110)
GLUCOSE SERPL-MCNC: 97 MG/DL (ref 70–110)
GLUCOSE UR QL STRIP: NEGATIVE
GLUCOSE UR QL STRIP: NEGATIVE
GRAM STN SPEC: NORMAL
GROSS: NORMAL
GROSS: NORMAL
HBA1C MFR BLD: 7.4 % (ref 4–5.6)
HCO3 UR-SCNC: 13.8 MMOL/L (ref 24–28)
HCT VFR BLD AUTO: 25.2 % (ref 40–54)
HCT VFR BLD AUTO: 27 % (ref 40–54)
HCT VFR BLD AUTO: 27.2 % (ref 40–54)
HCT VFR BLD AUTO: 27.4 % (ref 40–54)
HCT VFR BLD AUTO: 27.5 % (ref 40–54)
HCT VFR BLD AUTO: 27.6 % (ref 40–54)
HCT VFR BLD AUTO: 28.1 % (ref 40–54)
HCT VFR BLD AUTO: 28.2 % (ref 40–54)
HCT VFR BLD AUTO: 28.5 % (ref 40–54)
HCT VFR BLD AUTO: 28.8 % (ref 40–54)
HCT VFR BLD AUTO: 28.9 % (ref 40–54)
HCT VFR BLD AUTO: 29 % (ref 40–54)
HCT VFR BLD AUTO: 29 % (ref 40–54)
HCT VFR BLD AUTO: 29.1 % (ref 40–54)
HCT VFR BLD AUTO: 29.1 % (ref 40–54)
HCT VFR BLD AUTO: 29.2 % (ref 40–54)
HCT VFR BLD AUTO: 29.4 % (ref 40–54)
HCT VFR BLD AUTO: 29.6 % (ref 40–54)
HCT VFR BLD AUTO: 30.1 % (ref 40–54)
HCT VFR BLD AUTO: 30.2 % (ref 40–54)
HCT VFR BLD AUTO: 30.3 % (ref 40–54)
HCT VFR BLD AUTO: 30.6 % (ref 40–54)
HCT VFR BLD AUTO: 30.6 % (ref 40–54)
HCT VFR BLD AUTO: 30.8 % (ref 40–54)
HCT VFR BLD AUTO: 31.2 % (ref 40–54)
HCT VFR BLD AUTO: 31.4 % (ref 40–54)
HCT VFR BLD AUTO: 31.5 % (ref 40–54)
HCT VFR BLD AUTO: 31.7 % (ref 40–54)
HCT VFR BLD AUTO: 31.7 % (ref 40–54)
HCT VFR BLD AUTO: 32.1 % (ref 40–54)
HCT VFR BLD AUTO: 32.1 % (ref 40–54)
HCT VFR BLD AUTO: 32.6 % (ref 40–54)
HCT VFR BLD AUTO: 33.5 % (ref 40–54)
HCT VFR BLD AUTO: 33.9 % (ref 40–54)
HCT VFR BLD AUTO: 35 % (ref 40–54)
HCT VFR BLD AUTO: 36.7 % (ref 40–54)
HCT VFR BLD AUTO: 37.4 % (ref 40–54)
HCT VFR BLD AUTO: 42.6 % (ref 40–54)
HGB BLD-MCNC: 10 G/DL (ref 14–18)
HGB BLD-MCNC: 10 G/DL (ref 14–18)
HGB BLD-MCNC: 10.1 G/DL (ref 14–18)
HGB BLD-MCNC: 10.2 G/DL (ref 14–18)
HGB BLD-MCNC: 10.3 G/DL (ref 14–18)
HGB BLD-MCNC: 11 G/DL (ref 14–18)
HGB BLD-MCNC: 11.1 G/DL (ref 14–18)
HGB BLD-MCNC: 11.2 G/DL (ref 14–18)
HGB BLD-MCNC: 11.7 G/DL (ref 14–18)
HGB BLD-MCNC: 11.8 G/DL (ref 14–18)
HGB BLD-MCNC: 13 G/DL (ref 14–18)
HGB BLD-MCNC: 7.4 G/DL (ref 14–18)
HGB BLD-MCNC: 8.1 G/DL (ref 14–18)
HGB BLD-MCNC: 8.2 G/DL (ref 14–18)
HGB BLD-MCNC: 8.3 G/DL (ref 14–18)
HGB BLD-MCNC: 8.4 G/DL (ref 14–18)
HGB BLD-MCNC: 8.7 G/DL (ref 14–18)
HGB BLD-MCNC: 8.8 G/DL (ref 14–18)
HGB BLD-MCNC: 8.9 G/DL (ref 14–18)
HGB BLD-MCNC: 9 G/DL (ref 14–18)
HGB BLD-MCNC: 9.2 G/DL (ref 14–18)
HGB BLD-MCNC: 9.2 G/DL (ref 14–18)
HGB BLD-MCNC: 9.3 G/DL (ref 14–18)
HGB BLD-MCNC: 9.4 G/DL (ref 14–18)
HGB BLD-MCNC: 9.5 G/DL (ref 14–18)
HGB BLD-MCNC: 9.5 G/DL (ref 14–18)
HGB BLD-MCNC: 9.7 G/DL (ref 14–18)
HGB BLD-MCNC: 9.8 G/DL (ref 14–18)
HGB UR QL STRIP: ABNORMAL
HGB UR QL STRIP: NEGATIVE
HYALINE CASTS #/AREA URNS LPF: 4 /LPF
HYALINE CASTS #/AREA URNS LPF: 4 /LPF
HYPOCHROMIA BLD QL SMEAR: ABNORMAL
IMM GRANULOCYTES # BLD AUTO: 0.01 K/UL (ref 0–0.04)
IMM GRANULOCYTES # BLD AUTO: 0.01 K/UL (ref 0–0.04)
IMM GRANULOCYTES # BLD AUTO: 0.02 K/UL (ref 0–0.04)
IMM GRANULOCYTES # BLD AUTO: 0.03 K/UL (ref 0–0.04)
IMM GRANULOCYTES # BLD AUTO: 0.04 K/UL (ref 0–0.04)
IMM GRANULOCYTES # BLD AUTO: 0.05 K/UL (ref 0–0.04)
IMM GRANULOCYTES # BLD AUTO: 0.06 K/UL (ref 0–0.04)
IMM GRANULOCYTES # BLD AUTO: 0.07 K/UL (ref 0–0.04)
IMM GRANULOCYTES # BLD AUTO: 0.07 K/UL (ref 0–0.04)
IMM GRANULOCYTES # BLD AUTO: 0.08 K/UL (ref 0–0.04)
IMM GRANULOCYTES # BLD AUTO: 0.09 K/UL (ref 0–0.04)
IMM GRANULOCYTES # BLD AUTO: 0.09 K/UL (ref 0–0.04)
IMM GRANULOCYTES # BLD AUTO: 0.11 K/UL (ref 0–0.04)
IMM GRANULOCYTES # BLD AUTO: 0.13 K/UL (ref 0–0.04)
IMM GRANULOCYTES # BLD AUTO: 0.13 K/UL (ref 0–0.04)
IMM GRANULOCYTES # BLD AUTO: 0.14 K/UL (ref 0–0.04)
IMM GRANULOCYTES # BLD AUTO: 0.15 K/UL (ref 0–0.04)
IMM GRANULOCYTES # BLD AUTO: 0.16 K/UL (ref 0–0.04)
IMM GRANULOCYTES # BLD AUTO: 0.21 K/UL (ref 0–0.04)
IMM GRANULOCYTES # BLD AUTO: 0.37 K/UL (ref 0–0.04)
IMM GRANULOCYTES # BLD AUTO: ABNORMAL K/UL (ref 0–0.04)
IMM GRANULOCYTES NFR BLD AUTO: 0.2 % (ref 0–0.5)
IMM GRANULOCYTES NFR BLD AUTO: 0.3 % (ref 0–0.5)
IMM GRANULOCYTES NFR BLD AUTO: 0.3 % (ref 0–0.5)
IMM GRANULOCYTES NFR BLD AUTO: 0.4 % (ref 0–0.5)
IMM GRANULOCYTES NFR BLD AUTO: 0.5 % (ref 0–0.5)
IMM GRANULOCYTES NFR BLD AUTO: 0.6 % (ref 0–0.5)
IMM GRANULOCYTES NFR BLD AUTO: 0.7 % (ref 0–0.5)
IMM GRANULOCYTES NFR BLD AUTO: 0.8 % (ref 0–0.5)
IMM GRANULOCYTES NFR BLD AUTO: 0.9 % (ref 0–0.5)
IMM GRANULOCYTES NFR BLD AUTO: 0.9 % (ref 0–0.5)
IMM GRANULOCYTES NFR BLD AUTO: 1 % (ref 0–0.5)
IMM GRANULOCYTES NFR BLD AUTO: 1 % (ref 0–0.5)
IMM GRANULOCYTES NFR BLD AUTO: 1.1 % (ref 0–0.5)
IMM GRANULOCYTES NFR BLD AUTO: 1.1 % (ref 0–0.5)
IMM GRANULOCYTES NFR BLD AUTO: 1.2 % (ref 0–0.5)
IMM GRANULOCYTES NFR BLD AUTO: 1.3 % (ref 0–0.5)
IMM GRANULOCYTES NFR BLD AUTO: 1.4 % (ref 0–0.5)
IMM GRANULOCYTES NFR BLD AUTO: 1.4 % (ref 0–0.5)
IMM GRANULOCYTES NFR BLD AUTO: 1.5 % (ref 0–0.5)
IMM GRANULOCYTES NFR BLD AUTO: 1.5 % (ref 0–0.5)
IMM GRANULOCYTES NFR BLD AUTO: 1.6 % (ref 0–0.5)
IMM GRANULOCYTES NFR BLD AUTO: 1.6 % (ref 0–0.5)
IMM GRANULOCYTES NFR BLD AUTO: 1.7 % (ref 0–0.5)
IMM GRANULOCYTES NFR BLD AUTO: 1.8 % (ref 0–0.5)
IMM GRANULOCYTES NFR BLD AUTO: 3.1 % (ref 0–0.5)
IMM GRANULOCYTES NFR BLD AUTO: ABNORMAL % (ref 0–0.5)
INR PPP: 1.2 (ref 0.8–1.2)
INR PPP: 1.3 (ref 0.8–1.2)
INTERVENTRICULAR SEPTUM: 1.59 CM (ref 0.6–1.1)
IRON SERPL-MCNC: 22 UG/DL (ref 45–160)
IVC DIAMETER: 1.3 CM
IVRT: 82.78 MSEC
KETONES UR QL STRIP: ABNORMAL
KETONES UR QL STRIP: NEGATIVE
LA MAJOR: 5.62 CM
LA MINOR: 4.79 CM
LA WIDTH: 3.6 CM
LACTATE SERPL-SCNC: 0.8 MMOL/L (ref 0.5–2.2)
LACTATE SERPL-SCNC: 0.9 MMOL/L (ref 0.5–2.2)
LACTATE SERPL-SCNC: 1.7 MMOL/L (ref 0.5–2.2)
LACTATE SERPL-SCNC: 2 MMOL/L (ref 0.5–2.2)
LDH SERPL L TO P-CCNC: 157 U/L (ref 110–260)
LEFT ATRIUM SIZE: 3.62 CM
LEFT ATRIUM VOLUME INDEX: 25.8 ML/M2
LEFT ATRIUM VOLUME: 57.29 CM3
LEFT INTERNAL DIMENSION IN SYSTOLE: 2.45 CM (ref 2.1–4)
LEFT VENTRICLE DIASTOLIC VOLUME INDEX: 26.52 ML/M2
LEFT VENTRICLE DIASTOLIC VOLUME: 58.87 ML
LEFT VENTRICLE MASS INDEX: 115 G/M2
LEFT VENTRICLE SYSTOLIC VOLUME INDEX: 9.6 ML/M2
LEFT VENTRICLE SYSTOLIC VOLUME: 21.24 ML
LEFT VENTRICULAR INTERNAL DIMENSION IN DIASTOLE: 3.72 CM (ref 3.5–6)
LEFT VENTRICULAR MASS: 254.3 G
LEUKOCYTE ESTERASE UR QL STRIP: NEGATIVE
LEUKOCYTE ESTERASE UR QL STRIP: NEGATIVE
LV LATERAL E/E' RATIO: 8.45 M/S
LV SEPTAL E/E' RATIO: 10.33 M/S
LVOT MG: 2.13 MMHG
LVOT MV: 0.72 CM/S
LYMPHOCYTES # BLD AUTO: 0.4 K/UL (ref 1–4.8)
LYMPHOCYTES # BLD AUTO: 0.6 K/UL (ref 1–4.8)
LYMPHOCYTES # BLD AUTO: 0.6 K/UL (ref 1–4.8)
LYMPHOCYTES # BLD AUTO: 0.7 K/UL (ref 1–4.8)
LYMPHOCYTES # BLD AUTO: 0.7 K/UL (ref 1–4.8)
LYMPHOCYTES # BLD AUTO: 0.8 K/UL (ref 1–4.8)
LYMPHOCYTES # BLD AUTO: 0.8 K/UL (ref 1–4.8)
LYMPHOCYTES # BLD AUTO: 0.9 K/UL (ref 1–4.8)
LYMPHOCYTES # BLD AUTO: 1 K/UL (ref 1–4.8)
LYMPHOCYTES # BLD AUTO: 1.1 K/UL (ref 1–4.8)
LYMPHOCYTES # BLD AUTO: 1.2 K/UL (ref 1–4.8)
LYMPHOCYTES # BLD AUTO: 1.2 K/UL (ref 1–4.8)
LYMPHOCYTES # BLD AUTO: 1.3 K/UL (ref 1–4.8)
LYMPHOCYTES # BLD AUTO: 1.3 K/UL (ref 1–4.8)
LYMPHOCYTES # BLD AUTO: 1.5 K/UL (ref 1–4.8)
LYMPHOCYTES # BLD AUTO: 1.6 K/UL (ref 1–4.8)
LYMPHOCYTES # BLD AUTO: 1.7 K/UL (ref 1–4.8)
LYMPHOCYTES # BLD AUTO: 1.8 K/UL (ref 1–4.8)
LYMPHOCYTES # BLD AUTO: 2.1 K/UL (ref 1–4.8)
LYMPHOCYTES # BLD AUTO: ABNORMAL K/UL (ref 1–4.8)
LYMPHOCYTES NFR BLD: 10.3 % (ref 18–48)
LYMPHOCYTES NFR BLD: 10.7 % (ref 18–48)
LYMPHOCYTES NFR BLD: 11.2 % (ref 18–48)
LYMPHOCYTES NFR BLD: 11.5 % (ref 18–48)
LYMPHOCYTES NFR BLD: 11.5 % (ref 18–48)
LYMPHOCYTES NFR BLD: 11.6 % (ref 18–48)
LYMPHOCYTES NFR BLD: 11.7 % (ref 18–48)
LYMPHOCYTES NFR BLD: 11.7 % (ref 18–48)
LYMPHOCYTES NFR BLD: 12 % (ref 18–48)
LYMPHOCYTES NFR BLD: 12.2 % (ref 18–48)
LYMPHOCYTES NFR BLD: 12.5 % (ref 18–48)
LYMPHOCYTES NFR BLD: 13.1 % (ref 18–48)
LYMPHOCYTES NFR BLD: 13.3 % (ref 18–48)
LYMPHOCYTES NFR BLD: 13.9 % (ref 18–48)
LYMPHOCYTES NFR BLD: 14.1 % (ref 18–48)
LYMPHOCYTES NFR BLD: 14.2 % (ref 18–48)
LYMPHOCYTES NFR BLD: 14.4 % (ref 18–48)
LYMPHOCYTES NFR BLD: 14.6 % (ref 18–48)
LYMPHOCYTES NFR BLD: 14.7 % (ref 18–48)
LYMPHOCYTES NFR BLD: 14.7 % (ref 18–48)
LYMPHOCYTES NFR BLD: 18 % (ref 18–48)
LYMPHOCYTES NFR BLD: 18.1 % (ref 18–48)
LYMPHOCYTES NFR BLD: 18.2 % (ref 18–48)
LYMPHOCYTES NFR BLD: 18.8 % (ref 18–48)
LYMPHOCYTES NFR BLD: 19 % (ref 18–48)
LYMPHOCYTES NFR BLD: 19.3 % (ref 18–48)
LYMPHOCYTES NFR BLD: 19.9 % (ref 18–48)
LYMPHOCYTES NFR BLD: 20.1 % (ref 18–48)
LYMPHOCYTES NFR BLD: 21 % (ref 18–48)
LYMPHOCYTES NFR BLD: 21.2 % (ref 18–48)
LYMPHOCYTES NFR BLD: 21.4 % (ref 18–48)
LYMPHOCYTES NFR BLD: 22 % (ref 18–48)
LYMPHOCYTES NFR BLD: 22.2 % (ref 18–48)
LYMPHOCYTES NFR BLD: 23.5 % (ref 18–48)
LYMPHOCYTES NFR BLD: 23.5 % (ref 18–48)
LYMPHOCYTES NFR BLD: 25.1 % (ref 18–48)
LYMPHOCYTES NFR BLD: 25.7 % (ref 18–48)
LYMPHOCYTES NFR BLD: 5.4 % (ref 18–48)
LYMPHOCYTES NFR BLD: 9 % (ref 18–48)
LYMPHOCYTES NFR BLD: 9.5 % (ref 18–48)
Lab: NORMAL
Lab: NORMAL
MAGNESIUM SERPL-MCNC: 1.3 MG/DL (ref 1.6–2.6)
MAGNESIUM SERPL-MCNC: 1.4 MG/DL (ref 1.6–2.6)
MAGNESIUM SERPL-MCNC: 1.5 MG/DL (ref 1.6–2.6)
MAGNESIUM SERPL-MCNC: 1.5 MG/DL (ref 1.6–2.6)
MAGNESIUM SERPL-MCNC: 1.7 MG/DL (ref 1.6–2.6)
MAGNESIUM SERPL-MCNC: 1.8 MG/DL (ref 1.6–2.6)
MAGNESIUM SERPL-MCNC: 2 MG/DL (ref 1.6–2.6)
MAGNESIUM SERPL-MCNC: 2 MG/DL (ref 1.6–2.6)
MAGNESIUM SERPL-MCNC: 2.1 MG/DL (ref 1.6–2.6)
MAGNESIUM SERPL-MCNC: 2.1 MG/DL (ref 1.6–2.6)
MAGNESIUM SERPL-MCNC: 2.2 MG/DL (ref 1.6–2.6)
MAGNESIUM SERPL-MCNC: 2.3 MG/DL (ref 1.6–2.6)
MAGNESIUM SERPL-MCNC: 2.4 MG/DL (ref 1.6–2.6)
MCH RBC QN AUTO: 27.1 PG (ref 27–31)
MCH RBC QN AUTO: 27.3 PG (ref 27–31)
MCH RBC QN AUTO: 27.4 PG (ref 27–31)
MCH RBC QN AUTO: 27.4 PG (ref 27–31)
MCH RBC QN AUTO: 27.5 PG (ref 27–31)
MCH RBC QN AUTO: 27.6 PG (ref 27–31)
MCH RBC QN AUTO: 27.8 PG (ref 27–31)
MCH RBC QN AUTO: 27.9 PG (ref 27–31)
MCH RBC QN AUTO: 28 PG (ref 27–31)
MCH RBC QN AUTO: 28 PG (ref 27–31)
MCH RBC QN AUTO: 28.2 PG (ref 27–31)
MCH RBC QN AUTO: 28.2 PG (ref 27–31)
MCH RBC QN AUTO: 28.6 PG (ref 27–31)
MCH RBC QN AUTO: 28.7 PG (ref 27–31)
MCH RBC QN AUTO: 28.8 PG (ref 27–31)
MCH RBC QN AUTO: 28.9 PG (ref 27–31)
MCH RBC QN AUTO: 29 PG (ref 27–31)
MCH RBC QN AUTO: 29.2 PG (ref 27–31)
MCH RBC QN AUTO: 29.3 PG (ref 27–31)
MCH RBC QN AUTO: 29.6 PG (ref 27–31)
MCH RBC QN AUTO: 29.7 PG (ref 27–31)
MCH RBC QN AUTO: 29.7 PG (ref 27–31)
MCH RBC QN AUTO: 29.8 PG (ref 27–31)
MCH RBC QN AUTO: 29.9 PG (ref 27–31)
MCH RBC QN AUTO: 30.1 PG (ref 27–31)
MCH RBC QN AUTO: 30.2 PG (ref 27–31)
MCH RBC QN AUTO: 30.4 PG (ref 27–31)
MCH RBC QN AUTO: 31.1 PG (ref 27–31)
MCH RBC QN AUTO: 31.3 PG (ref 27–31)
MCH RBC QN AUTO: 31.5 PG (ref 27–31)
MCHC RBC AUTO-ENTMCNC: 29.4 G/DL (ref 32–36)
MCHC RBC AUTO-ENTMCNC: 29.6 G/DL (ref 32–36)
MCHC RBC AUTO-ENTMCNC: 29.9 G/DL (ref 32–36)
MCHC RBC AUTO-ENTMCNC: 30 G/DL (ref 32–36)
MCHC RBC AUTO-ENTMCNC: 30.1 G/DL (ref 32–36)
MCHC RBC AUTO-ENTMCNC: 30.1 G/DL (ref 32–36)
MCHC RBC AUTO-ENTMCNC: 30.2 G/DL (ref 32–36)
MCHC RBC AUTO-ENTMCNC: 30.3 G/DL (ref 32–36)
MCHC RBC AUTO-ENTMCNC: 30.4 G/DL (ref 32–36)
MCHC RBC AUTO-ENTMCNC: 30.4 G/DL (ref 32–36)
MCHC RBC AUTO-ENTMCNC: 30.5 G/DL (ref 32–36)
MCHC RBC AUTO-ENTMCNC: 30.6 G/DL (ref 32–36)
MCHC RBC AUTO-ENTMCNC: 30.6 G/DL (ref 32–36)
MCHC RBC AUTO-ENTMCNC: 30.7 G/DL (ref 32–36)
MCHC RBC AUTO-ENTMCNC: 30.8 G/DL (ref 32–36)
MCHC RBC AUTO-ENTMCNC: 30.9 G/DL (ref 32–36)
MCHC RBC AUTO-ENTMCNC: 31.3 G/DL (ref 32–36)
MCHC RBC AUTO-ENTMCNC: 31.3 G/DL (ref 32–36)
MCHC RBC AUTO-ENTMCNC: 31.5 G/DL (ref 32–36)
MCHC RBC AUTO-ENTMCNC: 31.5 G/DL (ref 32–36)
MCHC RBC AUTO-ENTMCNC: 31.6 G/DL (ref 32–36)
MCHC RBC AUTO-ENTMCNC: 31.6 G/DL (ref 32–36)
MCHC RBC AUTO-ENTMCNC: 31.8 G/DL (ref 32–36)
MCHC RBC AUTO-ENTMCNC: 32 G/DL (ref 32–36)
MCHC RBC AUTO-ENTMCNC: 32 G/DL (ref 32–36)
MCHC RBC AUTO-ENTMCNC: 32.1 G/DL (ref 32–36)
MCHC RBC AUTO-ENTMCNC: 32.2 G/DL (ref 32–36)
MCHC RBC AUTO-ENTMCNC: 32.3 G/DL (ref 32–36)
MCHC RBC AUTO-ENTMCNC: 32.4 G/DL (ref 32–36)
MCHC RBC AUTO-ENTMCNC: 32.4 G/DL (ref 32–36)
MCHC RBC AUTO-ENTMCNC: 32.5 G/DL (ref 32–36)
MCHC RBC AUTO-ENTMCNC: 32.7 G/DL (ref 32–36)
MCHC RBC AUTO-ENTMCNC: 32.8 G/DL (ref 32–36)
MCHC RBC AUTO-ENTMCNC: 33.1 G/DL (ref 32–36)
MCHC RBC AUTO-ENTMCNC: 33.3 G/DL (ref 32–36)
MCV RBC AUTO: 100 FL (ref 82–98)
MCV RBC AUTO: 100 FL (ref 82–98)
MCV RBC AUTO: 87 FL (ref 82–98)
MCV RBC AUTO: 89 FL (ref 82–98)
MCV RBC AUTO: 90 FL (ref 82–98)
MCV RBC AUTO: 91 FL (ref 82–98)
MCV RBC AUTO: 92 FL (ref 82–98)
MCV RBC AUTO: 93 FL (ref 82–98)
MCV RBC AUTO: 94 FL (ref 82–98)
MCV RBC AUTO: 95 FL (ref 82–98)
MCV RBC AUTO: 96 FL (ref 82–98)
MCV RBC AUTO: 96 FL (ref 82–98)
MCV RBC AUTO: 98 FL (ref 82–98)
MICROSCOPIC COMMENT: ABNORMAL
MICROSCOPIC COMMENT: ABNORMAL
MODE: ABNORMAL
MONOCYTES # BLD AUTO: 0.3 K/UL (ref 0.3–1)
MONOCYTES # BLD AUTO: 0.3 K/UL (ref 0.3–1)
MONOCYTES # BLD AUTO: 0.4 K/UL (ref 0.3–1)
MONOCYTES # BLD AUTO: 0.4 K/UL (ref 0.3–1)
MONOCYTES # BLD AUTO: 0.6 K/UL (ref 0.3–1)
MONOCYTES # BLD AUTO: 0.7 K/UL (ref 0.3–1)
MONOCYTES # BLD AUTO: 0.8 K/UL (ref 0.3–1)
MONOCYTES # BLD AUTO: 0.8 K/UL (ref 0.3–1)
MONOCYTES # BLD AUTO: 0.9 K/UL (ref 0.3–1)
MONOCYTES # BLD AUTO: 0.9 K/UL (ref 0.3–1)
MONOCYTES # BLD AUTO: 1 K/UL (ref 0.3–1)
MONOCYTES # BLD AUTO: 1.1 K/UL (ref 0.3–1)
MONOCYTES # BLD AUTO: 1.1 K/UL (ref 0.3–1)
MONOCYTES # BLD AUTO: 1.2 K/UL (ref 0.3–1)
MONOCYTES # BLD AUTO: 1.4 K/UL (ref 0.3–1)
MONOCYTES # BLD AUTO: 1.4 K/UL (ref 0.3–1)
MONOCYTES # BLD AUTO: 1.5 K/UL (ref 0.3–1)
MONOCYTES # BLD AUTO: 1.7 K/UL (ref 0.3–1)
MONOCYTES # BLD AUTO: 1.8 K/UL (ref 0.3–1)
MONOCYTES # BLD AUTO: ABNORMAL K/UL (ref 0.3–1)
MONOCYTES NFR BLD: 10.7 % (ref 4–15)
MONOCYTES NFR BLD: 10.8 % (ref 4–15)
MONOCYTES NFR BLD: 11 % (ref 4–15)
MONOCYTES NFR BLD: 11.4 % (ref 4–15)
MONOCYTES NFR BLD: 11.8 % (ref 4–15)
MONOCYTES NFR BLD: 11.9 % (ref 4–15)
MONOCYTES NFR BLD: 12.1 % (ref 4–15)
MONOCYTES NFR BLD: 12.2 % (ref 4–15)
MONOCYTES NFR BLD: 12.3 % (ref 4–15)
MONOCYTES NFR BLD: 12.4 % (ref 4–15)
MONOCYTES NFR BLD: 12.8 % (ref 4–15)
MONOCYTES NFR BLD: 12.8 % (ref 4–15)
MONOCYTES NFR BLD: 13.3 % (ref 4–15)
MONOCYTES NFR BLD: 13.6 % (ref 4–15)
MONOCYTES NFR BLD: 13.6 % (ref 4–15)
MONOCYTES NFR BLD: 13.9 % (ref 4–15)
MONOCYTES NFR BLD: 13.9 % (ref 4–15)
MONOCYTES NFR BLD: 14 % (ref 4–15)
MONOCYTES NFR BLD: 14.2 % (ref 4–15)
MONOCYTES NFR BLD: 14.4 % (ref 4–15)
MONOCYTES NFR BLD: 15.5 % (ref 4–15)
MONOCYTES NFR BLD: 15.6 % (ref 4–15)
MONOCYTES NFR BLD: 16.1 % (ref 4–15)
MONOCYTES NFR BLD: 16.3 % (ref 4–15)
MONOCYTES NFR BLD: 16.4 % (ref 4–15)
MONOCYTES NFR BLD: 16.8 % (ref 4–15)
MONOCYTES NFR BLD: 17.1 % (ref 4–15)
MONOCYTES NFR BLD: 17.2 % (ref 4–15)
MONOCYTES NFR BLD: 17.7 % (ref 4–15)
MONOCYTES NFR BLD: 19.1 % (ref 4–15)
MONOCYTES NFR BLD: 4 % (ref 4–15)
MONOCYTES NFR BLD: 6.1 % (ref 4–15)
MONOCYTES NFR BLD: 6.7 % (ref 4–15)
MONOCYTES NFR BLD: 7 % (ref 4–15)
MONOCYTES NFR BLD: 8 % (ref 4–15)
MONOCYTES NFR BLD: 8 % (ref 4–15)
MONOCYTES NFR BLD: 8.5 % (ref 4–15)
MONOCYTES NFR BLD: 9.7 % (ref 4–15)
MV PEAK A VEL: 1.15 M/S
MV PEAK E VEL: 0.93 M/S
MV STENOSIS PRESSURE HALF TIME: 62.36 MS
MV VALVE AREA P 1/2 METHOD: 3.53 CM2
MYCOBACTERIUM SPEC QL CULT: NORMAL
MYELOCYTES NFR BLD MANUAL: 1 %
NEUTROPHILS # BLD AUTO: 1.9 K/UL (ref 1.8–7.7)
NEUTROPHILS # BLD AUTO: 2.1 K/UL (ref 1.8–7.7)
NEUTROPHILS # BLD AUTO: 2.2 K/UL (ref 1.8–7.7)
NEUTROPHILS # BLD AUTO: 2.2 K/UL (ref 1.8–7.7)
NEUTROPHILS # BLD AUTO: 2.4 K/UL (ref 1.8–7.7)
NEUTROPHILS # BLD AUTO: 2.7 K/UL (ref 1.8–7.7)
NEUTROPHILS # BLD AUTO: 2.8 K/UL (ref 1.8–7.7)
NEUTROPHILS # BLD AUTO: 2.8 K/UL (ref 1.8–7.7)
NEUTROPHILS # BLD AUTO: 3 K/UL (ref 1.8–7.7)
NEUTROPHILS # BLD AUTO: 3 K/UL (ref 1.8–7.7)
NEUTROPHILS # BLD AUTO: 3.7 K/UL (ref 1.8–7.7)
NEUTROPHILS # BLD AUTO: 3.8 K/UL (ref 1.8–7.7)
NEUTROPHILS # BLD AUTO: 4.1 K/UL (ref 1.8–7.7)
NEUTROPHILS # BLD AUTO: 4.2 K/UL (ref 1.8–7.7)
NEUTROPHILS # BLD AUTO: 4.4 K/UL (ref 1.8–7.7)
NEUTROPHILS # BLD AUTO: 4.6 K/UL (ref 1.8–7.7)
NEUTROPHILS # BLD AUTO: 4.8 K/UL (ref 1.8–7.7)
NEUTROPHILS # BLD AUTO: 4.9 K/UL (ref 1.8–7.7)
NEUTROPHILS # BLD AUTO: 5 K/UL (ref 1.8–7.7)
NEUTROPHILS # BLD AUTO: 5.2 K/UL (ref 1.8–7.7)
NEUTROPHILS # BLD AUTO: 5.2 K/UL (ref 1.8–7.7)
NEUTROPHILS # BLD AUTO: 5.3 K/UL (ref 1.8–7.7)
NEUTROPHILS # BLD AUTO: 5.7 K/UL (ref 1.8–7.7)
NEUTROPHILS # BLD AUTO: 5.9 K/UL (ref 1.8–7.7)
NEUTROPHILS # BLD AUTO: 6 K/UL (ref 1.8–7.7)
NEUTROPHILS # BLD AUTO: 6.3 K/UL (ref 1.8–7.7)
NEUTROPHILS # BLD AUTO: 6.5 K/UL (ref 1.8–7.7)
NEUTROPHILS # BLD AUTO: 6.9 K/UL (ref 1.8–7.7)
NEUTROPHILS # BLD AUTO: 6.9 K/UL (ref 1.8–7.7)
NEUTROPHILS # BLD AUTO: 7.3 K/UL (ref 1.8–7.7)
NEUTROPHILS # BLD AUTO: 7.5 K/UL (ref 1.8–7.7)
NEUTROPHILS # BLD AUTO: 7.6 K/UL (ref 1.8–7.7)
NEUTROPHILS # BLD AUTO: 8 K/UL (ref 1.8–7.7)
NEUTROPHILS # BLD AUTO: 8.3 K/UL (ref 1.8–7.7)
NEUTROPHILS # BLD AUTO: 8.3 K/UL (ref 1.8–7.7)
NEUTROPHILS # BLD AUTO: 9.2 K/UL (ref 1.8–7.7)
NEUTROPHILS # BLD AUTO: 9.8 K/UL (ref 1.8–7.7)
NEUTROPHILS NFR BLD: 54.3 % (ref 38–73)
NEUTROPHILS NFR BLD: 55.3 % (ref 38–73)
NEUTROPHILS NFR BLD: 57.7 % (ref 38–73)
NEUTROPHILS NFR BLD: 58.1 % (ref 38–73)
NEUTROPHILS NFR BLD: 59.2 % (ref 38–73)
NEUTROPHILS NFR BLD: 61.1 % (ref 38–73)
NEUTROPHILS NFR BLD: 61.1 % (ref 38–73)
NEUTROPHILS NFR BLD: 62.1 % (ref 38–73)
NEUTROPHILS NFR BLD: 63.1 % (ref 38–73)
NEUTROPHILS NFR BLD: 64.1 % (ref 38–73)
NEUTROPHILS NFR BLD: 64.5 % (ref 38–73)
NEUTROPHILS NFR BLD: 64.5 % (ref 38–73)
NEUTROPHILS NFR BLD: 66.3 % (ref 38–73)
NEUTROPHILS NFR BLD: 66.7 % (ref 38–73)
NEUTROPHILS NFR BLD: 66.8 % (ref 38–73)
NEUTROPHILS NFR BLD: 67.2 % (ref 38–73)
NEUTROPHILS NFR BLD: 67.3 % (ref 38–73)
NEUTROPHILS NFR BLD: 67.5 % (ref 38–73)
NEUTROPHILS NFR BLD: 67.7 % (ref 38–73)
NEUTROPHILS NFR BLD: 68.1 % (ref 38–73)
NEUTROPHILS NFR BLD: 68.7 % (ref 38–73)
NEUTROPHILS NFR BLD: 69.1 % (ref 38–73)
NEUTROPHILS NFR BLD: 70.7 % (ref 38–73)
NEUTROPHILS NFR BLD: 71.9 % (ref 38–73)
NEUTROPHILS NFR BLD: 72 % (ref 38–73)
NEUTROPHILS NFR BLD: 72.1 % (ref 38–73)
NEUTROPHILS NFR BLD: 72.1 % (ref 38–73)
NEUTROPHILS NFR BLD: 72.8 % (ref 38–73)
NEUTROPHILS NFR BLD: 74.6 % (ref 38–73)
NEUTROPHILS NFR BLD: 75.2 % (ref 38–73)
NEUTROPHILS NFR BLD: 75.6 % (ref 38–73)
NEUTROPHILS NFR BLD: 76.6 % (ref 38–73)
NEUTROPHILS NFR BLD: 77 % (ref 38–73)
NEUTROPHILS NFR BLD: 79.6 % (ref 38–73)
NEUTROPHILS NFR BLD: 80.5 % (ref 38–73)
NEUTROPHILS NFR BLD: 80.6 % (ref 38–73)
NEUTROPHILS NFR BLD: 83.1 % (ref 38–73)
NEUTROPHILS NFR BLD: 86.9 % (ref 38–73)
NITRITE UR QL STRIP: NEGATIVE
NITRITE UR QL STRIP: NEGATIVE
NRBC BLD-RTO: 0 /100 WBC
NRBC BLD-RTO: 1 /100 WBC
NRBC BLD-RTO: 1 /100 WBC
NRBC BLD-RTO: 2 /100 WBC
NRBC BLD-RTO: 2 /100 WBC
NUM UNITS TRANS PACKED RBC: NORMAL
OSMOLALITY UR: 387 MOSM/KG (ref 50–1200)
OVALOCYTES BLD QL SMEAR: ABNORMAL
PCO2 BLDA: 26.4 MMHG (ref 35–45)
PH SMN: 7.33 [PH] (ref 7.35–7.45)
PH UR STRIP: 6 [PH] (ref 5–8)
PH UR STRIP: 6 [PH] (ref 5–8)
PHOSPHATE SERPL-MCNC: 2.1 MG/DL (ref 2.7–4.5)
PHOSPHATE SERPL-MCNC: 2.2 MG/DL (ref 2.7–4.5)
PHOSPHATE SERPL-MCNC: 2.2 MG/DL (ref 2.7–4.5)
PHOSPHATE SERPL-MCNC: 2.8 MG/DL (ref 2.7–4.5)
PHOSPHATE SERPL-MCNC: 3.1 MG/DL (ref 2.7–4.5)
PHOSPHATE SERPL-MCNC: 3.3 MG/DL (ref 2.7–4.5)
PHOSPHATE SERPL-MCNC: 3.5 MG/DL (ref 2.7–4.5)
PHOSPHATE SERPL-MCNC: 3.6 MG/DL (ref 2.7–4.5)
PISA TR MAX VEL: 2.53 M/S
PLATELET # BLD AUTO: 127 K/UL (ref 150–450)
PLATELET # BLD AUTO: 128 K/UL (ref 150–450)
PLATELET # BLD AUTO: 139 K/UL (ref 150–450)
PLATELET # BLD AUTO: 140 K/UL (ref 150–450)
PLATELET # BLD AUTO: 144 K/UL (ref 150–450)
PLATELET # BLD AUTO: 148 K/UL (ref 150–450)
PLATELET # BLD AUTO: 152 K/UL (ref 150–450)
PLATELET # BLD AUTO: 153 K/UL (ref 150–450)
PLATELET # BLD AUTO: 155 K/UL (ref 150–450)
PLATELET # BLD AUTO: 155 K/UL (ref 150–450)
PLATELET # BLD AUTO: 156 K/UL (ref 150–450)
PLATELET # BLD AUTO: 160 K/UL (ref 150–450)
PLATELET # BLD AUTO: 165 K/UL (ref 150–450)
PLATELET # BLD AUTO: 181 K/UL (ref 150–450)
PLATELET # BLD AUTO: 185 K/UL (ref 150–450)
PLATELET # BLD AUTO: 199 K/UL (ref 150–450)
PLATELET # BLD AUTO: 201 K/UL (ref 150–450)
PLATELET # BLD AUTO: 209 K/UL (ref 150–450)
PLATELET # BLD AUTO: 210 K/UL (ref 150–450)
PLATELET # BLD AUTO: 215 K/UL (ref 150–450)
PLATELET # BLD AUTO: 218 K/UL (ref 150–450)
PLATELET # BLD AUTO: 221 K/UL (ref 150–450)
PLATELET # BLD AUTO: 223 K/UL (ref 150–450)
PLATELET # BLD AUTO: 223 K/UL (ref 150–450)
PLATELET # BLD AUTO: 225 K/UL (ref 150–450)
PLATELET # BLD AUTO: 228 K/UL (ref 150–450)
PLATELET # BLD AUTO: 232 K/UL (ref 150–450)
PLATELET # BLD AUTO: 247 K/UL (ref 150–450)
PLATELET # BLD AUTO: 247 K/UL (ref 150–450)
PLATELET # BLD AUTO: 248 K/UL (ref 150–450)
PLATELET # BLD AUTO: 250 K/UL (ref 150–450)
PLATELET # BLD AUTO: 261 K/UL (ref 150–450)
PLATELET # BLD AUTO: 293 K/UL (ref 150–450)
PLATELET # BLD AUTO: 295 K/UL (ref 150–450)
PLATELET # BLD AUTO: 302 K/UL (ref 150–450)
PLATELET # BLD AUTO: 303 K/UL (ref 150–450)
PLATELET # BLD AUTO: 309 K/UL (ref 150–450)
PLATELET # BLD AUTO: 324 K/UL (ref 150–450)
PLATELET # BLD AUTO: 342 K/UL (ref 150–450)
PLATELET # BLD AUTO: 346 K/UL (ref 150–450)
PLATELET BLD QL SMEAR: ABNORMAL
PLATELET BLD QL SMEAR: ABNORMAL
PMV BLD AUTO: 10 FL (ref 9.2–12.9)
PMV BLD AUTO: 10.1 FL (ref 9.2–12.9)
PMV BLD AUTO: 10.2 FL (ref 9.2–12.9)
PMV BLD AUTO: 10.2 FL (ref 9.2–12.9)
PMV BLD AUTO: 10.3 FL (ref 9.2–12.9)
PMV BLD AUTO: 10.4 FL (ref 9.2–12.9)
PMV BLD AUTO: 10.5 FL (ref 9.2–12.9)
PMV BLD AUTO: 10.7 FL (ref 9.2–12.9)
PMV BLD AUTO: 9 FL (ref 9.2–12.9)
PMV BLD AUTO: 9 FL (ref 9.2–12.9)
PMV BLD AUTO: 9.2 FL (ref 9.2–12.9)
PMV BLD AUTO: 9.2 FL (ref 9.2–12.9)
PMV BLD AUTO: 9.3 FL (ref 9.2–12.9)
PMV BLD AUTO: 9.3 FL (ref 9.2–12.9)
PMV BLD AUTO: 9.4 FL (ref 9.2–12.9)
PMV BLD AUTO: 9.4 FL (ref 9.2–12.9)
PMV BLD AUTO: 9.5 FL (ref 9.2–12.9)
PMV BLD AUTO: 9.6 FL (ref 9.2–12.9)
PMV BLD AUTO: 9.6 FL (ref 9.2–12.9)
PMV BLD AUTO: 9.7 FL (ref 9.2–12.9)
PMV BLD AUTO: 9.7 FL (ref 9.2–12.9)
PMV BLD AUTO: 9.8 FL (ref 9.2–12.9)
PMV BLD AUTO: 9.9 FL (ref 9.2–12.9)
PO2 BLDA: 64 MMHG (ref 80–100)
POC BE: -12 MMOL/L
POC SATURATED O2: 91 % (ref 95–100)
POCT GLUCOSE: 113 MG/DL (ref 70–110)
POCT GLUCOSE: 114 MG/DL (ref 70–110)
POCT GLUCOSE: 115 MG/DL (ref 70–110)
POCT GLUCOSE: 120 MG/DL (ref 70–110)
POCT GLUCOSE: 120 MG/DL (ref 70–110)
POCT GLUCOSE: 123 MG/DL (ref 70–110)
POCT GLUCOSE: 124 MG/DL (ref 70–110)
POCT GLUCOSE: 126 MG/DL (ref 70–110)
POCT GLUCOSE: 127 MG/DL (ref 70–110)
POCT GLUCOSE: 128 MG/DL (ref 70–110)
POCT GLUCOSE: 128 MG/DL (ref 70–110)
POCT GLUCOSE: 129 MG/DL (ref 70–110)
POCT GLUCOSE: 130 MG/DL (ref 70–110)
POCT GLUCOSE: 131 MG/DL (ref 70–110)
POCT GLUCOSE: 132 MG/DL (ref 70–110)
POCT GLUCOSE: 132 MG/DL (ref 70–110)
POCT GLUCOSE: 133 MG/DL (ref 70–110)
POCT GLUCOSE: 134 MG/DL (ref 70–110)
POCT GLUCOSE: 135 MG/DL (ref 70–110)
POCT GLUCOSE: 138 MG/DL (ref 70–110)
POCT GLUCOSE: 139 MG/DL (ref 70–110)
POCT GLUCOSE: 141 MG/DL (ref 70–110)
POCT GLUCOSE: 141 MG/DL (ref 70–110)
POCT GLUCOSE: 143 MG/DL (ref 70–110)
POCT GLUCOSE: 145 MG/DL (ref 70–110)
POCT GLUCOSE: 147 MG/DL (ref 70–110)
POCT GLUCOSE: 147 MG/DL (ref 70–110)
POCT GLUCOSE: 149 MG/DL (ref 70–110)
POCT GLUCOSE: 150 MG/DL (ref 70–110)
POCT GLUCOSE: 150 MG/DL (ref 70–110)
POCT GLUCOSE: 151 MG/DL (ref 70–110)
POCT GLUCOSE: 151 MG/DL (ref 70–110)
POCT GLUCOSE: 152 MG/DL (ref 70–110)
POCT GLUCOSE: 155 MG/DL (ref 70–110)
POCT GLUCOSE: 156 MG/DL (ref 70–110)
POCT GLUCOSE: 157 MG/DL (ref 70–110)
POCT GLUCOSE: 160 MG/DL (ref 70–110)
POCT GLUCOSE: 161 MG/DL (ref 70–110)
POCT GLUCOSE: 162 MG/DL (ref 70–110)
POCT GLUCOSE: 163 MG/DL (ref 70–110)
POCT GLUCOSE: 164 MG/DL (ref 70–110)
POCT GLUCOSE: 165 MG/DL (ref 70–110)
POCT GLUCOSE: 166 MG/DL (ref 70–110)
POCT GLUCOSE: 166 MG/DL (ref 70–110)
POCT GLUCOSE: 167 MG/DL (ref 70–110)
POCT GLUCOSE: 169 MG/DL (ref 70–110)
POCT GLUCOSE: 171 MG/DL (ref 70–110)
POCT GLUCOSE: 176 MG/DL (ref 70–110)
POCT GLUCOSE: 178 MG/DL (ref 70–110)
POCT GLUCOSE: 181 MG/DL (ref 70–110)
POCT GLUCOSE: 183 MG/DL (ref 70–110)
POCT GLUCOSE: 183 MG/DL (ref 70–110)
POCT GLUCOSE: 184 MG/DL (ref 70–110)
POCT GLUCOSE: 187 MG/DL (ref 70–110)
POCT GLUCOSE: 187 MG/DL (ref 70–110)
POCT GLUCOSE: 188 MG/DL (ref 70–110)
POCT GLUCOSE: 189 MG/DL (ref 70–110)
POCT GLUCOSE: 190 MG/DL (ref 70–110)
POCT GLUCOSE: 191 MG/DL (ref 70–110)
POCT GLUCOSE: 192 MG/DL (ref 70–110)
POCT GLUCOSE: 192 MG/DL (ref 70–110)
POCT GLUCOSE: 193 MG/DL (ref 70–110)
POCT GLUCOSE: 193 MG/DL (ref 70–110)
POCT GLUCOSE: 195 MG/DL (ref 70–110)
POCT GLUCOSE: 195 MG/DL (ref 70–110)
POCT GLUCOSE: 196 MG/DL (ref 70–110)
POCT GLUCOSE: 197 MG/DL (ref 70–110)
POCT GLUCOSE: 198 MG/DL (ref 70–110)
POCT GLUCOSE: 199 MG/DL (ref 70–110)
POCT GLUCOSE: 202 MG/DL (ref 70–110)
POCT GLUCOSE: 202 MG/DL (ref 70–110)
POCT GLUCOSE: 203 MG/DL (ref 70–110)
POCT GLUCOSE: 204 MG/DL (ref 70–110)
POCT GLUCOSE: 204 MG/DL (ref 70–110)
POCT GLUCOSE: 205 MG/DL (ref 70–110)
POCT GLUCOSE: 207 MG/DL (ref 70–110)
POCT GLUCOSE: 208 MG/DL (ref 70–110)
POCT GLUCOSE: 209 MG/DL (ref 70–110)
POCT GLUCOSE: 210 MG/DL (ref 70–110)
POCT GLUCOSE: 210 MG/DL (ref 70–110)
POCT GLUCOSE: 211 MG/DL (ref 70–110)
POCT GLUCOSE: 212 MG/DL (ref 70–110)
POCT GLUCOSE: 217 MG/DL (ref 70–110)
POCT GLUCOSE: 218 MG/DL (ref 70–110)
POCT GLUCOSE: 219 MG/DL (ref 70–110)
POCT GLUCOSE: 220 MG/DL (ref 70–110)
POCT GLUCOSE: 222 MG/DL (ref 70–110)
POCT GLUCOSE: 222 MG/DL (ref 70–110)
POCT GLUCOSE: 224 MG/DL (ref 70–110)
POCT GLUCOSE: 224 MG/DL (ref 70–110)
POCT GLUCOSE: 225 MG/DL (ref 70–110)
POCT GLUCOSE: 227 MG/DL (ref 70–110)
POCT GLUCOSE: 227 MG/DL (ref 70–110)
POCT GLUCOSE: 228 MG/DL (ref 70–110)
POCT GLUCOSE: 229 MG/DL (ref 70–110)
POCT GLUCOSE: 229 MG/DL (ref 70–110)
POCT GLUCOSE: 230 MG/DL (ref 70–110)
POCT GLUCOSE: 231 MG/DL (ref 70–110)
POCT GLUCOSE: 232 MG/DL (ref 70–110)
POCT GLUCOSE: 234 MG/DL (ref 70–110)
POCT GLUCOSE: 236 MG/DL (ref 70–110)
POCT GLUCOSE: 239 MG/DL (ref 70–110)
POCT GLUCOSE: 239 MG/DL (ref 70–110)
POCT GLUCOSE: 241 MG/DL (ref 70–110)
POCT GLUCOSE: 242 MG/DL (ref 70–110)
POCT GLUCOSE: 243 MG/DL (ref 70–110)
POCT GLUCOSE: 247 MG/DL (ref 70–110)
POCT GLUCOSE: 249 MG/DL (ref 70–110)
POCT GLUCOSE: 251 MG/DL (ref 70–110)
POCT GLUCOSE: 254 MG/DL (ref 70–110)
POCT GLUCOSE: 255 MG/DL (ref 70–110)
POCT GLUCOSE: 256 MG/DL (ref 70–110)
POCT GLUCOSE: 258 MG/DL (ref 70–110)
POCT GLUCOSE: 262 MG/DL (ref 70–110)
POCT GLUCOSE: 262 MG/DL (ref 70–110)
POCT GLUCOSE: 263 MG/DL (ref 70–110)
POCT GLUCOSE: 263 MG/DL (ref 70–110)
POCT GLUCOSE: 264 MG/DL (ref 70–110)
POCT GLUCOSE: 264 MG/DL (ref 70–110)
POCT GLUCOSE: 265 MG/DL (ref 70–110)
POCT GLUCOSE: 265 MG/DL (ref 70–110)
POCT GLUCOSE: 268 MG/DL (ref 70–110)
POCT GLUCOSE: 275 MG/DL (ref 70–110)
POCT GLUCOSE: 277 MG/DL (ref 70–110)
POCT GLUCOSE: 280 MG/DL (ref 70–110)
POCT GLUCOSE: 281 MG/DL (ref 70–110)
POCT GLUCOSE: 283 MG/DL (ref 70–110)
POCT GLUCOSE: 294 MG/DL (ref 70–110)
POCT GLUCOSE: 296 MG/DL (ref 70–110)
POCT GLUCOSE: 299 MG/DL (ref 70–110)
POCT GLUCOSE: 299 MG/DL (ref 70–110)
POCT GLUCOSE: 303 MG/DL (ref 70–110)
POCT GLUCOSE: 305 MG/DL (ref 70–110)
POCT GLUCOSE: 309 MG/DL (ref 70–110)
POCT GLUCOSE: 311 MG/DL (ref 70–110)
POCT GLUCOSE: 316 MG/DL (ref 70–110)
POCT GLUCOSE: 325 MG/DL (ref 70–110)
POCT GLUCOSE: 335 MG/DL (ref 70–110)
POCT GLUCOSE: 339 MG/DL (ref 70–110)
POCT GLUCOSE: 343 MG/DL (ref 70–110)
POCT GLUCOSE: 361 MG/DL (ref 70–110)
POCT GLUCOSE: 377 MG/DL (ref 70–110)
POCT GLUCOSE: 378 MG/DL (ref 70–110)
POCT GLUCOSE: 413 MG/DL (ref 70–110)
POCT GLUCOSE: 420 MG/DL (ref 70–110)
POCT GLUCOSE: 435 MG/DL (ref 70–110)
POCT GLUCOSE: 437 MG/DL (ref 70–110)
POCT GLUCOSE: 442 MG/DL (ref 70–110)
POCT GLUCOSE: 443 MG/DL (ref 70–110)
POCT GLUCOSE: 485 MG/DL (ref 70–110)
POCT GLUCOSE: 55 MG/DL (ref 70–110)
POCT GLUCOSE: 59 MG/DL (ref 70–110)
POCT GLUCOSE: 59 MG/DL (ref 70–110)
POCT GLUCOSE: 68 MG/DL (ref 70–110)
POCT GLUCOSE: 70 MG/DL (ref 70–110)
POCT GLUCOSE: 74 MG/DL (ref 70–110)
POCT GLUCOSE: 75 MG/DL (ref 70–110)
POCT GLUCOSE: 80 MG/DL (ref 70–110)
POCT GLUCOSE: 82 MG/DL (ref 70–110)
POCT GLUCOSE: 84 MG/DL (ref 70–110)
POCT GLUCOSE: 85 MG/DL (ref 70–110)
POCT GLUCOSE: 89 MG/DL (ref 70–110)
POCT GLUCOSE: 90 MG/DL (ref 70–110)
POCT GLUCOSE: 94 MG/DL (ref 70–110)
POCT GLUCOSE: 94 MG/DL (ref 70–110)
POCT GLUCOSE: 97 MG/DL (ref 70–110)
POCT GLUCOSE: 97 MG/DL (ref 70–110)
POCT GLUCOSE: <20 MG/DL (ref 70–110)
POCT GLUCOSE: >500 MG/DL (ref 70–110)
POIKILOCYTOSIS BLD QL SMEAR: SLIGHT
POLYCHROMASIA BLD QL SMEAR: ABNORMAL
POLYCHROMASIA BLD QL SMEAR: ABNORMAL
POTASSIUM SERPL-SCNC: 2.8 MMOL/L (ref 3.5–5.1)
POTASSIUM SERPL-SCNC: 3.2 MMOL/L (ref 3.5–5.1)
POTASSIUM SERPL-SCNC: 3.3 MMOL/L (ref 3.5–5.1)
POTASSIUM SERPL-SCNC: 3.3 MMOL/L (ref 3.5–5.1)
POTASSIUM SERPL-SCNC: 3.4 MMOL/L (ref 3.5–5.1)
POTASSIUM SERPL-SCNC: 3.4 MMOL/L (ref 3.5–5.1)
POTASSIUM SERPL-SCNC: 3.5 MMOL/L (ref 3.5–5.1)
POTASSIUM SERPL-SCNC: 3.5 MMOL/L (ref 3.5–5.1)
POTASSIUM SERPL-SCNC: 3.6 MMOL/L (ref 3.5–5.1)
POTASSIUM SERPL-SCNC: 3.6 MMOL/L (ref 3.5–5.1)
POTASSIUM SERPL-SCNC: 3.7 MMOL/L (ref 3.5–5.1)
POTASSIUM SERPL-SCNC: 3.8 MMOL/L (ref 3.5–5.1)
POTASSIUM SERPL-SCNC: 3.9 MMOL/L (ref 3.5–5.1)
POTASSIUM SERPL-SCNC: 3.9 MMOL/L (ref 3.5–5.1)
POTASSIUM SERPL-SCNC: 4 MMOL/L (ref 3.5–5.1)
POTASSIUM SERPL-SCNC: 4.1 MMOL/L (ref 3.5–5.1)
POTASSIUM SERPL-SCNC: 4.2 MMOL/L (ref 3.5–5.1)
POTASSIUM SERPL-SCNC: 4.3 MMOL/L (ref 3.5–5.1)
POTASSIUM SERPL-SCNC: 4.3 MMOL/L (ref 3.5–5.1)
POTASSIUM SERPL-SCNC: 4.5 MMOL/L (ref 3.5–5.1)
POTASSIUM SERPL-SCNC: 4.5 MMOL/L (ref 3.5–5.1)
POTASSIUM SERPL-SCNC: 4.6 MMOL/L (ref 3.5–5.1)
POTASSIUM SERPL-SCNC: 4.6 MMOL/L (ref 3.5–5.1)
POTASSIUM SERPL-SCNC: 4.7 MMOL/L (ref 3.5–5.1)
POTASSIUM SERPL-SCNC: 4.7 MMOL/L (ref 3.5–5.1)
POTASSIUM SERPL-SCNC: 4.8 MMOL/L (ref 3.5–5.1)
POTASSIUM SERPL-SCNC: 4.8 MMOL/L (ref 3.5–5.1)
POTASSIUM SERPL-SCNC: 4.9 MMOL/L (ref 3.5–5.1)
POTASSIUM SERPL-SCNC: 5 MMOL/L (ref 3.5–5.1)
POTASSIUM SERPL-SCNC: 5 MMOL/L (ref 3.5–5.1)
POTASSIUM SERPL-SCNC: 5.1 MMOL/L (ref 3.5–5.1)
POTASSIUM SERPL-SCNC: 5.2 MMOL/L (ref 3.5–5.1)
POTASSIUM SERPL-SCNC: 5.6 MMOL/L (ref 3.5–5.1)
PREALB SERPL-MCNC: 3 MG/DL (ref 20–43)
PROCALCITONIN SERPL IA-MCNC: 0.38 NG/ML
PROCALCITONIN SERPL IA-MCNC: 12.61 NG/ML
PROCALCITONIN SERPL IA-MCNC: 3.82 NG/ML
PROCALCITONIN SERPL IA-MCNC: 7.71 NG/ML
PROT SERPL-MCNC: 4.5 G/DL (ref 6–8.4)
PROT SERPL-MCNC: 4.8 G/DL (ref 6–8.4)
PROT SERPL-MCNC: 4.8 G/DL (ref 6–8.4)
PROT SERPL-MCNC: 5 G/DL (ref 6–8.4)
PROT SERPL-MCNC: 5 G/DL (ref 6–8.4)
PROT SERPL-MCNC: 5.2 G/DL (ref 6–8.4)
PROT SERPL-MCNC: 5.2 G/DL (ref 6–8.4)
PROT SERPL-MCNC: 5.3 G/DL (ref 6–8.4)
PROT SERPL-MCNC: 5.4 G/DL (ref 6–8.4)
PROT SERPL-MCNC: 5.5 G/DL (ref 6–8.4)
PROT SERPL-MCNC: 5.5 G/DL (ref 6–8.4)
PROT SERPL-MCNC: 5.7 G/DL (ref 6–8.4)
PROT SERPL-MCNC: 5.9 G/DL (ref 6–8.4)
PROT SERPL-MCNC: 6 G/DL (ref 6–8.4)
PROT SERPL-MCNC: 6.1 G/DL (ref 6–8.4)
PROT SERPL-MCNC: 6.2 G/DL (ref 6–8.4)
PROT SERPL-MCNC: 6.3 G/DL (ref 6–8.4)
PROT SERPL-MCNC: 6.3 G/DL (ref 6–8.4)
PROT SERPL-MCNC: 6.7 G/DL (ref 6–8.4)
PROT SERPL-MCNC: 6.7 G/DL (ref 6–8.4)
PROT UR QL STRIP: ABNORMAL
PROT UR QL STRIP: ABNORMAL
PROTHROMBIN TIME: 13 SEC (ref 9–12.5)
PROTHROMBIN TIME: 13.9 SEC (ref 9–12.5)
PV MEAN GRADIENT: 2.63 MMHG
RA MAJOR: 4.42 CM
RA PRESSURE: 3 MMHG
RA WIDTH: 2.57 CM
RBC # BLD AUTO: 2.66 M/UL (ref 4.6–6.2)
RBC # BLD AUTO: 2.8 M/UL (ref 4.6–6.2)
RBC # BLD AUTO: 2.9 M/UL (ref 4.6–6.2)
RBC # BLD AUTO: 2.91 M/UL (ref 4.6–6.2)
RBC # BLD AUTO: 2.91 M/UL (ref 4.6–6.2)
RBC # BLD AUTO: 2.94 M/UL (ref 4.6–6.2)
RBC # BLD AUTO: 2.98 M/UL (ref 4.6–6.2)
RBC # BLD AUTO: 3.03 M/UL (ref 4.6–6.2)
RBC # BLD AUTO: 3.04 M/UL (ref 4.6–6.2)
RBC # BLD AUTO: 3.05 M/UL (ref 4.6–6.2)
RBC # BLD AUTO: 3.05 M/UL (ref 4.6–6.2)
RBC # BLD AUTO: 3.1 M/UL (ref 4.6–6.2)
RBC # BLD AUTO: 3.13 M/UL (ref 4.6–6.2)
RBC # BLD AUTO: 3.15 M/UL (ref 4.6–6.2)
RBC # BLD AUTO: 3.18 M/UL (ref 4.6–6.2)
RBC # BLD AUTO: 3.23 M/UL (ref 4.6–6.2)
RBC # BLD AUTO: 3.25 M/UL (ref 4.6–6.2)
RBC # BLD AUTO: 3.25 M/UL (ref 4.6–6.2)
RBC # BLD AUTO: 3.26 M/UL (ref 4.6–6.2)
RBC # BLD AUTO: 3.27 M/UL (ref 4.6–6.2)
RBC # BLD AUTO: 3.29 M/UL (ref 4.6–6.2)
RBC # BLD AUTO: 3.33 M/UL (ref 4.6–6.2)
RBC # BLD AUTO: 3.36 M/UL (ref 4.6–6.2)
RBC # BLD AUTO: 3.39 M/UL (ref 4.6–6.2)
RBC # BLD AUTO: 3.41 M/UL (ref 4.6–6.2)
RBC # BLD AUTO: 3.44 M/UL (ref 4.6–6.2)
RBC # BLD AUTO: 3.47 M/UL (ref 4.6–6.2)
RBC # BLD AUTO: 3.48 M/UL (ref 4.6–6.2)
RBC # BLD AUTO: 3.54 M/UL (ref 4.6–6.2)
RBC # BLD AUTO: 3.54 M/UL (ref 4.6–6.2)
RBC # BLD AUTO: 3.56 M/UL (ref 4.6–6.2)
RBC # BLD AUTO: 3.69 M/UL (ref 4.6–6.2)
RBC # BLD AUTO: 3.76 M/UL (ref 4.6–6.2)
RBC # BLD AUTO: 3.8 M/UL (ref 4.6–6.2)
RBC # BLD AUTO: 3.98 M/UL (ref 4.6–6.2)
RBC # BLD AUTO: 4.28 M/UL (ref 4.6–6.2)
RBC #/AREA URNS HPF: 1 /HPF (ref 0–4)
RBC #/AREA URNS HPF: 4 /HPF (ref 0–4)
SAMPLE: ABNORMAL
SARS-COV-2 RDRP RESP QL NAA+PROBE: NEGATIVE
SARS-COV-2 RDRP RESP QL NAA+PROBE: NEGATIVE
SARS-COV-2 RDRP RESP QL NAA+PROBE: POSITIVE
SARS-COV-2 RDRP RESP QL NAA+PROBE: POSITIVE
SATURATED IRON: 9 % (ref 20–50)
SINUS: 3.49 CM
SITE: ABNORMAL
SODIUM SERPL-SCNC: 126 MMOL/L (ref 136–145)
SODIUM SERPL-SCNC: 128 MMOL/L (ref 136–145)
SODIUM SERPL-SCNC: 128 MMOL/L (ref 136–145)
SODIUM SERPL-SCNC: 129 MMOL/L (ref 136–145)
SODIUM SERPL-SCNC: 130 MMOL/L (ref 136–145)
SODIUM SERPL-SCNC: 131 MMOL/L (ref 136–145)
SODIUM SERPL-SCNC: 132 MMOL/L (ref 136–145)
SODIUM SERPL-SCNC: 133 MMOL/L (ref 136–145)
SODIUM SERPL-SCNC: 134 MMOL/L (ref 136–145)
SODIUM SERPL-SCNC: 135 MMOL/L (ref 136–145)
SODIUM SERPL-SCNC: 136 MMOL/L (ref 136–145)
SODIUM SERPL-SCNC: 137 MMOL/L (ref 136–145)
SODIUM SERPL-SCNC: 137 MMOL/L (ref 136–145)
SODIUM SERPL-SCNC: 139 MMOL/L (ref 136–145)
SODIUM SERPL-SCNC: 139 MMOL/L (ref 136–145)
SODIUM SERPL-SCNC: 140 MMOL/L (ref 136–145)
SODIUM UR-SCNC: <20 MMOL/L (ref 20–250)
SP GR UR STRIP: 1.01 (ref 1–1.03)
SP GR UR STRIP: 1.02 (ref 1–1.03)
STJ: 3.49 CM
T4 FREE SERPL-MCNC: 0.91 NG/DL (ref 0.71–1.51)
TACROLIMUS BLD-MCNC: 10.8 NG/ML (ref 5–15)
TACROLIMUS BLD-MCNC: 12.9 NG/ML (ref 5–15)
TACROLIMUS BLD-MCNC: 13.8 NG/ML (ref 5–15)
TACROLIMUS BLD-MCNC: 2 NG/ML (ref 5–15)
TACROLIMUS BLD-MCNC: 3.7 NG/ML (ref 5–15)
TACROLIMUS BLD-MCNC: 4 NG/ML (ref 5–15)
TACROLIMUS BLD-MCNC: 5.8 NG/ML (ref 5–15)
TACROLIMUS BLD-MCNC: 6.4 NG/ML (ref 5–15)
TACROLIMUS BLD-MCNC: 6.5 NG/ML (ref 5–15)
TACROLIMUS BLD-MCNC: 6.6 NG/ML (ref 5–15)
TACROLIMUS BLD-MCNC: 6.6 NG/ML (ref 5–15)
TACROLIMUS BLD-MCNC: 6.7 NG/ML (ref 5–15)
TACROLIMUS BLD-MCNC: 7.1 NG/ML (ref 5–15)
TACROLIMUS BLD-MCNC: 9.2 NG/ML (ref 5–15)
TACROLIMUS BLD-MCNC: 9.8 NG/ML (ref 5–15)
TACROLIMUS BLD-MCNC: 9.8 NG/ML (ref 5–15)
TACROLIMUS BLD-MCNC: 9.9 NG/ML (ref 5–15)
TDI LATERAL: 0.11 M/S
TDI SEPTAL: 0.09 M/S
TDI: 0.1 M/S
TOTAL IRON BINDING CAPACITY: 244 UG/DL (ref 250–450)
TR MAX PG: 26 MMHG
TRANSFERRIN SERPL-MCNC: 165 MG/DL (ref 200–375)
TROPONIN I SERPL DL<=0.01 NG/ML-MCNC: 0.01 NG/ML (ref 0–0.03)
TROPONIN I SERPL DL<=0.01 NG/ML-MCNC: 0.02 NG/ML (ref 0–0.03)
TROPONIN I SERPL DL<=0.01 NG/ML-MCNC: <0.006 NG/ML (ref 0–0.03)
TSH SERPL DL<=0.005 MIU/L-ACNC: 3.5 UIU/ML (ref 0.4–4)
TV REST PULMONARY ARTERY PRESSURE: 29 MMHG
UNIDENT CRYS URNS QL MICRO: ABNORMAL
URN SPEC COLLECT METH UR: ABNORMAL
URN SPEC COLLECT METH UR: ABNORMAL
UROBILINOGEN UR STRIP-ACNC: NEGATIVE EU/DL
UROBILINOGEN UR STRIP-ACNC: NEGATIVE EU/DL
VANCOMYCIN SERPL-MCNC: 15.5 UG/ML
VANCOMYCIN SERPL-MCNC: 16.9 UG/ML
VIT B12 SERPL-MCNC: 472 PG/ML (ref 210–950)
WBC # BLD AUTO: 10.02 K/UL (ref 3.9–12.7)
WBC # BLD AUTO: 10.36 K/UL (ref 3.9–12.7)
WBC # BLD AUTO: 10.46 K/UL (ref 3.9–12.7)
WBC # BLD AUTO: 10.57 K/UL (ref 3.9–12.7)
WBC # BLD AUTO: 10.89 K/UL (ref 3.9–12.7)
WBC # BLD AUTO: 12.07 K/UL (ref 3.9–12.7)
WBC # BLD AUTO: 12.73 K/UL (ref 3.9–12.7)
WBC # BLD AUTO: 13.05 K/UL (ref 3.9–12.7)
WBC # BLD AUTO: 3.27 K/UL (ref 3.9–12.7)
WBC # BLD AUTO: 3.38 K/UL (ref 3.9–12.7)
WBC # BLD AUTO: 3.75 K/UL (ref 3.9–12.7)
WBC # BLD AUTO: 3.87 K/UL (ref 3.9–12.7)
WBC # BLD AUTO: 4.05 K/UL (ref 3.9–12.7)
WBC # BLD AUTO: 4.28 K/UL (ref 3.9–12.7)
WBC # BLD AUTO: 4.45 K/UL (ref 3.9–12.7)
WBC # BLD AUTO: 4.55 K/UL (ref 3.9–12.7)
WBC # BLD AUTO: 4.57 K/UL (ref 3.9–12.7)
WBC # BLD AUTO: 4.67 K/UL (ref 3.9–12.7)
WBC # BLD AUTO: 5.1 K/UL (ref 3.9–12.7)
WBC # BLD AUTO: 5.41 K/UL (ref 3.9–12.7)
WBC # BLD AUTO: 5.94 K/UL (ref 3.9–12.7)
WBC # BLD AUTO: 6.13 K/UL (ref 3.9–12.7)
WBC # BLD AUTO: 6.36 K/UL (ref 3.9–12.7)
WBC # BLD AUTO: 6.57 K/UL (ref 3.9–12.7)
WBC # BLD AUTO: 6.72 K/UL (ref 3.9–12.7)
WBC # BLD AUTO: 7.04 K/UL (ref 3.9–12.7)
WBC # BLD AUTO: 7.48 K/UL (ref 3.9–12.7)
WBC # BLD AUTO: 7.51 K/UL (ref 3.9–12.7)
WBC # BLD AUTO: 7.51 K/UL (ref 3.9–12.7)
WBC # BLD AUTO: 7.73 K/UL (ref 3.9–12.7)
WBC # BLD AUTO: 7.84 K/UL (ref 3.9–12.7)
WBC # BLD AUTO: 7.89 K/UL (ref 3.9–12.7)
WBC # BLD AUTO: 7.98 K/UL (ref 3.9–12.7)
WBC # BLD AUTO: 7.98 K/UL (ref 3.9–12.7)
WBC # BLD AUTO: 8.04 K/UL (ref 3.9–12.7)
WBC # BLD AUTO: 8.08 K/UL (ref 3.9–12.7)
WBC # BLD AUTO: 8.73 K/UL (ref 3.9–12.7)
WBC # BLD AUTO: 8.93 K/UL (ref 3.9–12.7)
WBC # BLD AUTO: 9.05 K/UL (ref 3.9–12.7)
WBC # BLD AUTO: 9.14 K/UL (ref 3.9–12.7)
WBC #/AREA URNS HPF: 2 /HPF (ref 0–5)
WBC #/AREA URNS HPF: 2 /HPF (ref 0–5)

## 2022-01-01 PROCEDURE — 99232 PR SUBSEQUENT HOSPITAL CARE,LEVL II: ICD-10-PCS | Mod: ,,, | Performed by: INTERNAL MEDICINE

## 2022-01-01 PROCEDURE — 11000001 HC ACUTE MED/SURG PRIVATE ROOM

## 2022-01-01 PROCEDURE — G0378 HOSPITAL OBSERVATION PER HR: HCPCS

## 2022-01-01 PROCEDURE — 25000003 PHARM REV CODE 250: Performed by: INTERNAL MEDICINE

## 2022-01-01 PROCEDURE — 96372 THER/PROPH/DIAG INJ SC/IM: CPT

## 2022-01-01 PROCEDURE — 63600175 PHARM REV CODE 636 W HCPCS: Performed by: SURGERY

## 2022-01-01 PROCEDURE — 25000003 PHARM REV CODE 250: Performed by: NURSE PRACTITIONER

## 2022-01-01 PROCEDURE — 85025 COMPLETE CBC W/AUTO DIFF WBC: CPT | Performed by: NURSE PRACTITIONER

## 2022-01-01 PROCEDURE — 94761 N-INVAS EAR/PLS OXIMETRY MLT: CPT

## 2022-01-01 PROCEDURE — 80202 ASSAY OF VANCOMYCIN: CPT | Performed by: EMERGENCY MEDICINE

## 2022-01-01 PROCEDURE — 25000242 PHARM REV CODE 250 ALT 637 W/ HCPCS: Performed by: NURSE PRACTITIONER

## 2022-01-01 PROCEDURE — 36415 COLL VENOUS BLD VENIPUNCTURE: CPT | Performed by: NURSE PRACTITIONER

## 2022-01-01 PROCEDURE — 87075 CULTR BACTERIA EXCEPT BLOOD: CPT | Performed by: ORTHOPAEDIC SURGERY

## 2022-01-01 PROCEDURE — 99232 SBSQ HOSP IP/OBS MODERATE 35: CPT | Mod: ,,, | Performed by: INTERNAL MEDICINE

## 2022-01-01 PROCEDURE — 93010 EKG 12-LEAD: ICD-10-PCS | Mod: ,,, | Performed by: INTERNAL MEDICINE

## 2022-01-01 PROCEDURE — 87076 CULTURE ANAEROBE IDENT EACH: CPT | Performed by: ORTHOPAEDIC SURGERY

## 2022-01-01 PROCEDURE — 27000221 HC OXYGEN, UP TO 24 HOURS

## 2022-01-01 PROCEDURE — 63600175 PHARM REV CODE 636 W HCPCS: Performed by: NURSE PRACTITIONER

## 2022-01-01 PROCEDURE — 99499 NO LOS: ICD-10-PCS | Mod: ,,, | Performed by: PHYSICIAN ASSISTANT

## 2022-01-01 PROCEDURE — 25000003 PHARM REV CODE 250: Performed by: FAMILY MEDICINE

## 2022-01-01 PROCEDURE — 63600175 PHARM REV CODE 636 W HCPCS: Performed by: INTERNAL MEDICINE

## 2022-01-01 PROCEDURE — 25000003 PHARM REV CODE 250: Performed by: SURGERY

## 2022-01-01 PROCEDURE — 82550 ASSAY OF CK (CPK): CPT | Performed by: EMERGENCY MEDICINE

## 2022-01-01 PROCEDURE — 63600175 PHARM REV CODE 636 W HCPCS: Performed by: FAMILY MEDICINE

## 2022-01-01 PROCEDURE — 80048 BASIC METABOLIC PNL TOTAL CA: CPT | Performed by: NURSE PRACTITIONER

## 2022-01-01 PROCEDURE — 21400001 HC TELEMETRY ROOM

## 2022-01-01 PROCEDURE — 83735 ASSAY OF MAGNESIUM: CPT | Performed by: INTERNAL MEDICINE

## 2022-01-01 PROCEDURE — 99223 PR INITIAL HOSPITAL CARE,LEVL III: ICD-10-PCS | Mod: ,,, | Performed by: INTERNAL MEDICINE

## 2022-01-01 PROCEDURE — 83735 ASSAY OF MAGNESIUM: CPT | Performed by: NURSE PRACTITIONER

## 2022-01-01 PROCEDURE — 96361 HYDRATE IV INFUSION ADD-ON: CPT

## 2022-01-01 PROCEDURE — 85025 COMPLETE CBC W/AUTO DIFF WBC: CPT | Performed by: EMERGENCY MEDICINE

## 2022-01-01 PROCEDURE — 99233 PR SUBSEQUENT HOSPITAL CARE,LEVL III: ICD-10-PCS | Mod: ,,, | Performed by: INTERNAL MEDICINE

## 2022-01-01 PROCEDURE — U0002 COVID-19 LAB TEST NON-CDC: HCPCS | Performed by: INTERNAL MEDICINE

## 2022-01-01 PROCEDURE — 80069 RENAL FUNCTION PANEL: CPT | Performed by: INTERNAL MEDICINE

## 2022-01-01 PROCEDURE — 99233 SBSQ HOSP IP/OBS HIGH 50: CPT | Mod: ,,, | Performed by: INTERNAL MEDICINE

## 2022-01-01 PROCEDURE — 99900035 HC TECH TIME PER 15 MIN (STAT)

## 2022-01-01 PROCEDURE — 99223 1ST HOSP IP/OBS HIGH 75: CPT | Mod: 57,,, | Performed by: PHYSICIAN ASSISTANT

## 2022-01-01 PROCEDURE — 99291 PR CRITICAL CARE, E/M 30-74 MINUTES: ICD-10-PCS | Mod: ,,, | Performed by: NURSE PRACTITIONER

## 2022-01-01 PROCEDURE — 85025 COMPLETE CBC W/AUTO DIFF WBC: CPT | Performed by: SURGERY

## 2022-01-01 PROCEDURE — G0426 INPT/ED TELECONSULT50: HCPCS | Mod: 95,,, | Performed by: PSYCHIATRY & NEUROLOGY

## 2022-01-01 PROCEDURE — 99024 PR POST-OP FOLLOW-UP VISIT: ICD-10-PCS | Mod: ,,, | Performed by: PHYSICIAN ASSISTANT

## 2022-01-01 PROCEDURE — 80053 COMPREHEN METABOLIC PANEL: CPT | Performed by: NURSE PRACTITIONER

## 2022-01-01 PROCEDURE — 63600175 PHARM REV CODE 636 W HCPCS: Performed by: PHYSICIAN ASSISTANT

## 2022-01-01 PROCEDURE — 85025 COMPLETE CBC W/AUTO DIFF WBC: CPT | Performed by: INTERNAL MEDICINE

## 2022-01-01 PROCEDURE — 84484 ASSAY OF TROPONIN QUANT: CPT | Performed by: NURSE PRACTITIONER

## 2022-01-01 PROCEDURE — 25000003 PHARM REV CODE 250: Performed by: NURSE ANESTHETIST, CERTIFIED REGISTERED

## 2022-01-01 PROCEDURE — 87070 CULTURE OTHR SPECIMN AEROBIC: CPT | Performed by: ORTHOPAEDIC SURGERY

## 2022-01-01 PROCEDURE — 80197 ASSAY OF TACROLIMUS: CPT | Performed by: INTERNAL MEDICINE

## 2022-01-01 PROCEDURE — 84300 ASSAY OF URINE SODIUM: CPT | Performed by: INTERNAL MEDICINE

## 2022-01-01 PROCEDURE — 86901 BLOOD TYPING SEROLOGIC RH(D): CPT | Performed by: NURSE PRACTITIONER

## 2022-01-01 PROCEDURE — 80053 COMPREHEN METABOLIC PANEL: CPT | Performed by: EMERGENCY MEDICINE

## 2022-01-01 PROCEDURE — 36415 COLL VENOUS BLD VENIPUNCTURE: CPT | Performed by: INTERNAL MEDICINE

## 2022-01-01 PROCEDURE — 99024 POSTOP FOLLOW-UP VISIT: CPT | Mod: ,,, | Performed by: ORTHOPAEDIC SURGERY

## 2022-01-01 PROCEDURE — 96366 THER/PROPH/DIAG IV INF ADDON: CPT

## 2022-01-01 PROCEDURE — 63600175 PHARM REV CODE 636 W HCPCS: Performed by: NURSE ANESTHETIST, CERTIFIED REGISTERED

## 2022-01-01 PROCEDURE — 25000003 PHARM REV CODE 250: Performed by: PHYSICIAN ASSISTANT

## 2022-01-01 PROCEDURE — 37000008 HC ANESTHESIA 1ST 15 MINUTES: Performed by: SURGERY

## 2022-01-01 PROCEDURE — 36415 COLL VENOUS BLD VENIPUNCTURE: CPT | Performed by: STUDENT IN AN ORGANIZED HEALTH CARE EDUCATION/TRAINING PROGRAM

## 2022-01-01 PROCEDURE — 99292 PR CRITICAL CARE, ADDL 30 MIN: ICD-10-PCS | Mod: ,,, | Performed by: INTERNAL MEDICINE

## 2022-01-01 PROCEDURE — 37000009 HC ANESTHESIA EA ADD 15 MINS: Performed by: ORTHOPAEDIC SURGERY

## 2022-01-01 PROCEDURE — 83880 ASSAY OF NATRIURETIC PEPTIDE: CPT | Performed by: NURSE PRACTITIONER

## 2022-01-01 PROCEDURE — 80197 ASSAY OF TACROLIMUS: CPT | Performed by: NURSE PRACTITIONER

## 2022-01-01 PROCEDURE — 99291 CRITICAL CARE FIRST HOUR: CPT | Mod: ,,, | Performed by: NURSE PRACTITIONER

## 2022-01-01 PROCEDURE — 99291 CRITICAL CARE FIRST HOUR: CPT | Mod: ,,, | Performed by: INTERNAL MEDICINE

## 2022-01-01 PROCEDURE — 94799 UNLISTED PULMONARY SVC/PX: CPT

## 2022-01-01 PROCEDURE — 97110 THERAPEUTIC EXERCISES: CPT

## 2022-01-01 PROCEDURE — 20000000 HC ICU ROOM

## 2022-01-01 PROCEDURE — 36000711: Performed by: SURGERY

## 2022-01-01 PROCEDURE — 97530 THERAPEUTIC ACTIVITIES: CPT | Mod: CQ

## 2022-01-01 PROCEDURE — 94640 AIRWAY INHALATION TREATMENT: CPT

## 2022-01-01 PROCEDURE — 63600175 PHARM REV CODE 636 W HCPCS: Mod: TB | Performed by: NURSE PRACTITIONER

## 2022-01-01 PROCEDURE — 37000008 HC ANESTHESIA 1ST 15 MINUTES: Performed by: ORTHOPAEDIC SURGERY

## 2022-01-01 PROCEDURE — 88307 PR  SURG PATH,LEVEL V: ICD-10-PCS | Mod: 26,,, | Performed by: STUDENT IN AN ORGANIZED HEALTH CARE EDUCATION/TRAINING PROGRAM

## 2022-01-01 PROCEDURE — 82962 GLUCOSE BLOOD TEST: CPT

## 2022-01-01 PROCEDURE — 27000207 HC ISOLATION

## 2022-01-01 PROCEDURE — 99499 UNLISTED E&M SERVICE: CPT | Mod: ,,, | Performed by: PHYSICIAN ASSISTANT

## 2022-01-01 PROCEDURE — 96374 THER/PROPH/DIAG INJ IV PUSH: CPT

## 2022-01-01 PROCEDURE — 63600175 PHARM REV CODE 636 W HCPCS: Mod: JG | Performed by: INTERNAL MEDICINE

## 2022-01-01 PROCEDURE — 97530 THERAPEUTIC ACTIVITIES: CPT

## 2022-01-01 PROCEDURE — 25000003 PHARM REV CODE 250: Performed by: ORTHOPAEDIC SURGERY

## 2022-01-01 PROCEDURE — 80048 BASIC METABOLIC PNL TOTAL CA: CPT | Performed by: INTERNAL MEDICINE

## 2022-01-01 PROCEDURE — 88311 DECALCIFY TISSUE: CPT | Mod: 26,,, | Performed by: PATHOLOGY

## 2022-01-01 PROCEDURE — 99223 1ST HOSP IP/OBS HIGH 75: CPT | Mod: ,,, | Performed by: PHYSICIAN ASSISTANT

## 2022-01-01 PROCEDURE — 97110 THERAPEUTIC EXERCISES: CPT | Mod: CQ

## 2022-01-01 PROCEDURE — 63600175 PHARM REV CODE 636 W HCPCS: Performed by: ANESTHESIOLOGY

## 2022-01-01 PROCEDURE — 99222 1ST HOSP IP/OBS MODERATE 55: CPT | Mod: ,,, | Performed by: INTERNAL MEDICINE

## 2022-01-01 PROCEDURE — 80048 BASIC METABOLIC PNL TOTAL CA: CPT | Mod: XB | Performed by: INTERNAL MEDICINE

## 2022-01-01 PROCEDURE — 36000710: Performed by: ORTHOPAEDIC SURGERY

## 2022-01-01 PROCEDURE — 25000003 PHARM REV CODE 250: Performed by: HOSPITALIST

## 2022-01-01 PROCEDURE — 99024 POSTOP FOLLOW-UP VISIT: CPT | Mod: ,,, | Performed by: PHYSICIAN ASSISTANT

## 2022-01-01 PROCEDURE — 97597 DBRDMT OPN WND 1ST 20 CM/<: CPT | Mod: ,,, | Performed by: SURGERY

## 2022-01-01 PROCEDURE — 80048 BASIC METABOLIC PNL TOTAL CA: CPT | Mod: XB | Performed by: NURSE PRACTITIONER

## 2022-01-01 PROCEDURE — 99233 PR SUBSEQUENT HOSPITAL CARE,LEVL III: ICD-10-PCS | Mod: ,,, | Performed by: PHYSICIAN ASSISTANT

## 2022-01-01 PROCEDURE — 82746 ASSAY OF FOLIC ACID SERUM: CPT | Performed by: NURSE PRACTITIONER

## 2022-01-01 PROCEDURE — 63600175 PHARM REV CODE 636 W HCPCS: Mod: JG | Performed by: SURGERY

## 2022-01-01 PROCEDURE — 97597 PR DEBRIDEMENT OPEN WOUND 20 SQ CM<: ICD-10-PCS | Mod: ,,, | Performed by: SURGERY

## 2022-01-01 PROCEDURE — 88307 TISSUE EXAM BY PATHOLOGIST: CPT | Mod: 26,,, | Performed by: STUDENT IN AN ORGANIZED HEALTH CARE EDUCATION/TRAINING PROGRAM

## 2022-01-01 PROCEDURE — 93005 ELECTROCARDIOGRAM TRACING: CPT

## 2022-01-01 PROCEDURE — 84145 PROCALCITONIN (PCT): CPT | Performed by: EMERGENCY MEDICINE

## 2022-01-01 PROCEDURE — 83935 ASSAY OF URINE OSMOLALITY: CPT | Performed by: INTERNAL MEDICINE

## 2022-01-01 PROCEDURE — 25000003 PHARM REV CODE 250: Performed by: EMERGENCY MEDICINE

## 2022-01-01 PROCEDURE — 94640 AIRWAY INHALATION TREATMENT: CPT | Mod: XB

## 2022-01-01 PROCEDURE — 99291 PR CRITICAL CARE, E/M 30-74 MINUTES: ICD-10-PCS | Mod: ,,, | Performed by: INTERNAL MEDICINE

## 2022-01-01 PROCEDURE — 85730 THROMBOPLASTIN TIME PARTIAL: CPT | Performed by: NURSE PRACTITIONER

## 2022-01-01 PROCEDURE — 87205 SMEAR GRAM STAIN: CPT | Performed by: ORTHOPAEDIC SURGERY

## 2022-01-01 PROCEDURE — 99499 NO LOS: ICD-10-PCS | Mod: ,,, | Performed by: ORTHOPAEDIC SURGERY

## 2022-01-01 PROCEDURE — 99223 1ST HOSP IP/OBS HIGH 75: CPT | Mod: NSCH,,, | Performed by: INTERNAL MEDICINE

## 2022-01-01 PROCEDURE — 96367 TX/PROPH/DG ADDL SEQ IV INF: CPT

## 2022-01-01 PROCEDURE — 83880 ASSAY OF NATRIURETIC PEPTIDE: CPT | Performed by: EMERGENCY MEDICINE

## 2022-01-01 PROCEDURE — 99233 SBSQ HOSP IP/OBS HIGH 50: CPT | Mod: ,,, | Performed by: PHYSICIAN ASSISTANT

## 2022-01-01 PROCEDURE — 99223 1ST HOSP IP/OBS HIGH 75: CPT | Mod: ,,, | Performed by: INTERNAL MEDICINE

## 2022-01-01 PROCEDURE — C1730 CATH, EP, 19 OR FEW ELECT: HCPCS | Performed by: SURGERY

## 2022-01-01 PROCEDURE — 99285 EMERGENCY DEPT VISIT HI MDM: CPT | Mod: 25

## 2022-01-01 PROCEDURE — 20690 PR APPLY BONE UNIPLANE,EXT FIX DEV: ICD-10-PCS | Mod: ,,, | Performed by: ORTHOPAEDIC SURGERY

## 2022-01-01 PROCEDURE — 36246 INS CATH ABD/L-EXT ART 2ND: CPT | Mod: LT | Performed by: SURGERY

## 2022-01-01 PROCEDURE — 25000003 PHARM REV CODE 250: Performed by: STUDENT IN AN ORGANIZED HEALTH CARE EDUCATION/TRAINING PROGRAM

## 2022-01-01 PROCEDURE — 93010 ELECTROCARDIOGRAM REPORT: CPT | Mod: ,,, | Performed by: INTERNAL MEDICINE

## 2022-01-01 PROCEDURE — 71000033 HC RECOVERY, INTIAL HOUR: Performed by: SURGERY

## 2022-01-01 PROCEDURE — 80048 BASIC METABOLIC PNL TOTAL CA: CPT | Performed by: STUDENT IN AN ORGANIZED HEALTH CARE EDUCATION/TRAINING PROGRAM

## 2022-01-01 PROCEDURE — 25500020 PHARM REV CODE 255: Performed by: SURGERY

## 2022-01-01 PROCEDURE — 82728 ASSAY OF FERRITIN: CPT | Performed by: INTERNAL MEDICINE

## 2022-01-01 PROCEDURE — 82607 VITAMIN B-12: CPT | Performed by: NURSE PRACTITIONER

## 2022-01-01 PROCEDURE — 36415 COLL VENOUS BLD VENIPUNCTURE: CPT | Performed by: SURGERY

## 2022-01-01 PROCEDURE — 84439 ASSAY OF FREE THYROXINE: CPT | Performed by: EMERGENCY MEDICINE

## 2022-01-01 PROCEDURE — 83735 ASSAY OF MAGNESIUM: CPT | Mod: 91 | Performed by: INTERNAL MEDICINE

## 2022-01-01 PROCEDURE — 36415 COLL VENOUS BLD VENIPUNCTURE: CPT | Performed by: EMERGENCY MEDICINE

## 2022-01-01 PROCEDURE — 84484 ASSAY OF TROPONIN QUANT: CPT | Performed by: EMERGENCY MEDICINE

## 2022-01-01 PROCEDURE — 85025 COMPLETE CBC W/AUTO DIFF WBC: CPT | Mod: 91 | Performed by: SURGERY

## 2022-01-01 PROCEDURE — 88300 SURGICAL PATH GROSS: CPT | Performed by: PATHOLOGY

## 2022-01-01 PROCEDURE — 88307 TISSUE EXAM BY PATHOLOGIST: CPT | Performed by: STUDENT IN AN ORGANIZED HEALTH CARE EDUCATION/TRAINING PROGRAM

## 2022-01-01 PROCEDURE — 64445 NJX AA&/STRD SCIATIC NRV IMG: CPT | Performed by: ANESTHESIOLOGY

## 2022-01-01 PROCEDURE — 96375 TX/PRO/DX INJ NEW DRUG ADDON: CPT

## 2022-01-01 PROCEDURE — G0426 PR INPT TELEHEALTH CONSULT 50M: ICD-10-PCS | Mod: 95,,, | Performed by: PSYCHIATRY & NEUROLOGY

## 2022-01-01 PROCEDURE — 84134 ASSAY OF PREALBUMIN: CPT | Performed by: NURSE PRACTITIONER

## 2022-01-01 PROCEDURE — 84145 PROCALCITONIN (PCT): CPT | Performed by: NURSE PRACTITIONER

## 2022-01-01 PROCEDURE — 82962 GLUCOSE BLOOD TEST: CPT | Performed by: ORTHOPAEDIC SURGERY

## 2022-01-01 PROCEDURE — 80048 BASIC METABOLIC PNL TOTAL CA: CPT | Performed by: ANESTHESIOLOGY

## 2022-01-01 PROCEDURE — 80197 ASSAY OF TACROLIMUS: CPT | Performed by: SURGERY

## 2022-01-01 PROCEDURE — 36000711: Performed by: ORTHOPAEDIC SURGERY

## 2022-01-01 PROCEDURE — 63600175 PHARM REV CODE 636 W HCPCS: Performed by: EMERGENCY MEDICINE

## 2022-01-01 PROCEDURE — 96365 THER/PROPH/DIAG IV INF INIT: CPT

## 2022-01-01 PROCEDURE — 99024 PR POST-OP FOLLOW-UP VISIT: ICD-10-PCS | Mod: ,,, | Performed by: ORTHOPAEDIC SURGERY

## 2022-01-01 PROCEDURE — 71000039 HC RECOVERY, EACH ADD'L HOUR: Performed by: SURGERY

## 2022-01-01 PROCEDURE — 36000710: Performed by: SURGERY

## 2022-01-01 PROCEDURE — 88311 DECALCIFY TISSUE: CPT | Performed by: PATHOLOGY

## 2022-01-01 PROCEDURE — C1713 ANCHOR/SCREW BN/BN,TIS/BN: HCPCS | Performed by: ORTHOPAEDIC SURGERY

## 2022-01-01 PROCEDURE — 80053 COMPREHEN METABOLIC PANEL: CPT | Performed by: INTERNAL MEDICINE

## 2022-01-01 PROCEDURE — C1887 CATHETER, GUIDING: HCPCS | Performed by: SURGERY

## 2022-01-01 PROCEDURE — 85025 COMPLETE CBC W/AUTO DIFF WBC: CPT | Mod: 91 | Performed by: NURSE PRACTITIONER

## 2022-01-01 PROCEDURE — 99292 CRITICAL CARE ADDL 30 MIN: CPT | Mod: ,,, | Performed by: INTERNAL MEDICINE

## 2022-01-01 PROCEDURE — 88311 PR  DECALCIFY TISSUE: ICD-10-PCS | Mod: 26,,, | Performed by: PATHOLOGY

## 2022-01-01 PROCEDURE — 85379 FIBRIN DEGRADATION QUANT: CPT | Performed by: NURSE PRACTITIONER

## 2022-01-01 PROCEDURE — 80053 COMPREHEN METABOLIC PANEL: CPT | Performed by: PHYSICIAN ASSISTANT

## 2022-01-01 PROCEDURE — 99499 UNLISTED E&M SERVICE: CPT | Mod: ,,, | Performed by: ORTHOPAEDIC SURGERY

## 2022-01-01 PROCEDURE — 84100 ASSAY OF PHOSPHORUS: CPT | Performed by: STUDENT IN AN ORGANIZED HEALTH CARE EDUCATION/TRAINING PROGRAM

## 2022-01-01 PROCEDURE — 81000 URINALYSIS NONAUTO W/SCOPE: CPT | Performed by: EMERGENCY MEDICINE

## 2022-01-01 PROCEDURE — 87040 BLOOD CULTURE FOR BACTERIA: CPT | Performed by: EMERGENCY MEDICINE

## 2022-01-01 PROCEDURE — 99152 MOD SED SAME PHYS/QHP 5/>YRS: CPT | Performed by: SURGERY

## 2022-01-01 PROCEDURE — 63600175 PHARM REV CODE 636 W HCPCS: Performed by: HOSPITALIST

## 2022-01-01 PROCEDURE — C1751 CATH, INF, PER/CENT/MIDLINE: HCPCS

## 2022-01-01 PROCEDURE — 36000707: Performed by: ORTHOPAEDIC SURGERY

## 2022-01-01 PROCEDURE — 96372 THER/PROPH/DIAG INJ SC/IM: CPT | Performed by: NURSE PRACTITIONER

## 2022-01-01 PROCEDURE — 80197 ASSAY OF TACROLIMUS: CPT | Performed by: FAMILY MEDICINE

## 2022-01-01 PROCEDURE — 25000003 PHARM REV CODE 250: Performed by: ANESTHESIOLOGY

## 2022-01-01 PROCEDURE — P9016 RBC LEUKOCYTES REDUCED: HCPCS | Performed by: NURSE PRACTITIONER

## 2022-01-01 PROCEDURE — 36569 INSJ PICC 5 YR+ W/O IMAGING: CPT

## 2022-01-01 PROCEDURE — 83605 ASSAY OF LACTIC ACID: CPT | Performed by: EMERGENCY MEDICINE

## 2022-01-01 PROCEDURE — 37000009 HC ANESTHESIA EA ADD 15 MINS: Performed by: SURGERY

## 2022-01-01 PROCEDURE — 27201423 OPTIME MED/SURG SUP & DEVICES STERILE SUPPLY: Performed by: ORTHOPAEDIC SURGERY

## 2022-01-01 PROCEDURE — 97163 PT EVAL HIGH COMPLEX 45 MIN: CPT

## 2022-01-01 PROCEDURE — 99223 PR INITIAL HOSPITAL CARE,LEVL III: ICD-10-PCS | Mod: ,,, | Performed by: PHYSICIAN ASSISTANT

## 2022-01-01 PROCEDURE — 71000033 HC RECOVERY, INTIAL HOUR: Performed by: ORTHOPAEDIC SURGERY

## 2022-01-01 PROCEDURE — 80069 RENAL FUNCTION PANEL: CPT | Performed by: HOSPITALIST

## 2022-01-01 PROCEDURE — 27201423 OPTIME MED/SURG SUP & DEVICES STERILE SUPPLY: Performed by: SURGERY

## 2022-01-01 PROCEDURE — 99223 PR INITIAL HOSPITAL CARE,LEVL III: ICD-10-PCS | Mod: 57,,, | Performed by: PHYSICIAN ASSISTANT

## 2022-01-01 PROCEDURE — 97161 PT EVAL LOW COMPLEX 20 MIN: CPT

## 2022-01-01 PROCEDURE — 84466 ASSAY OF TRANSFERRIN: CPT | Performed by: NURSE PRACTITIONER

## 2022-01-01 PROCEDURE — 99223 PR INITIAL HOSPITAL CARE,LEVL III: ICD-10-PCS | Mod: 57,AI,, | Performed by: PHYSICIAN ASSISTANT

## 2022-01-01 PROCEDURE — 99223 PR INITIAL HOSPITAL CARE,LEVL III: ICD-10-PCS | Mod: ,,, | Performed by: SURGERY

## 2022-01-01 PROCEDURE — 82565 ASSAY OF CREATININE: CPT | Performed by: STUDENT IN AN ORGANIZED HEALTH CARE EDUCATION/TRAINING PROGRAM

## 2022-01-01 PROCEDURE — U0002 COVID-19 LAB TEST NON-CDC: HCPCS | Performed by: EMERGENCY MEDICINE

## 2022-01-01 PROCEDURE — 84443 ASSAY THYROID STIM HORMONE: CPT | Performed by: EMERGENCY MEDICINE

## 2022-01-01 PROCEDURE — 85651 RBC SED RATE NONAUTOMATED: CPT | Performed by: EMERGENCY MEDICINE

## 2022-01-01 PROCEDURE — 87040 BLOOD CULTURE FOR BACTERIA: CPT | Performed by: STUDENT IN AN ORGANIZED HEALTH CARE EDUCATION/TRAINING PROGRAM

## 2022-01-01 PROCEDURE — 97535 SELF CARE MNGMENT TRAINING: CPT

## 2022-01-01 PROCEDURE — 87116 MYCOBACTERIA CULTURE: CPT | Performed by: ORTHOPAEDIC SURGERY

## 2022-01-01 PROCEDURE — 99223 PR INITIAL HOSPITAL CARE,LEVL III: ICD-10-PCS | Mod: NSCH,,, | Performed by: INTERNAL MEDICINE

## 2022-01-01 PROCEDURE — 85610 PROTHROMBIN TIME: CPT | Performed by: NURSE PRACTITIONER

## 2022-01-01 PROCEDURE — 85025 COMPLETE CBC W/AUTO DIFF WBC: CPT | Performed by: FAMILY MEDICINE

## 2022-01-01 PROCEDURE — 36415 COLL VENOUS BLD VENIPUNCTURE: CPT | Performed by: FAMILY MEDICINE

## 2022-01-01 PROCEDURE — 84100 ASSAY OF PHOSPHORUS: CPT | Performed by: NURSE PRACTITIONER

## 2022-01-01 PROCEDURE — 51702 INSERT TEMP BLADDER CATH: CPT

## 2022-01-01 PROCEDURE — 88300 SURGICAL PATH GROSS: CPT | Mod: 26,59,, | Performed by: PATHOLOGY

## 2022-01-01 PROCEDURE — 87015 SPECIMEN INFECT AGNT CONCNTJ: CPT | Performed by: ORTHOPAEDIC SURGERY

## 2022-01-01 PROCEDURE — C1894 INTRO/SHEATH, NON-LASER: HCPCS | Performed by: SURGERY

## 2022-01-01 PROCEDURE — 87102 FUNGUS ISOLATION CULTURE: CPT | Performed by: ORTHOPAEDIC SURGERY

## 2022-01-01 PROCEDURE — 27752 PR CLOSED RX TIBIA SHAFT FX,MANIPULATN: ICD-10-PCS | Mod: 51,LT,, | Performed by: ORTHOPAEDIC SURGERY

## 2022-01-01 PROCEDURE — 99282 EMERGENCY DEPT VISIT SF MDM: CPT

## 2022-01-01 PROCEDURE — 97162 PT EVAL MOD COMPLEX 30 MIN: CPT

## 2022-01-01 PROCEDURE — 99497 ADVNCD CARE PLAN 30 MIN: CPT | Mod: 25,,, | Performed by: PHYSICIAN ASSISTANT

## 2022-01-01 PROCEDURE — 86920 COMPATIBILITY TEST SPIN: CPT | Performed by: NURSE PRACTITIONER

## 2022-01-01 PROCEDURE — 87077 CULTURE AEROBIC IDENTIFY: CPT | Performed by: ORTHOPAEDIC SURGERY

## 2022-01-01 PROCEDURE — 86140 C-REACTIVE PROTEIN: CPT | Performed by: EMERGENCY MEDICINE

## 2022-01-01 PROCEDURE — 82728 ASSAY OF FERRITIN: CPT | Performed by: EMERGENCY MEDICINE

## 2022-01-01 PROCEDURE — 27752 TREATMENT OF TIBIA FRACTURE: CPT | Mod: 51,LT,, | Performed by: ORTHOPAEDIC SURGERY

## 2022-01-01 PROCEDURE — 97166 OT EVAL MOD COMPLEX 45 MIN: CPT

## 2022-01-01 PROCEDURE — 84100 ASSAY OF PHOSPHORUS: CPT | Performed by: INTERNAL MEDICINE

## 2022-01-01 PROCEDURE — 80053 COMPREHEN METABOLIC PANEL: CPT | Performed by: STUDENT IN AN ORGANIZED HEALTH CARE EDUCATION/TRAINING PROGRAM

## 2022-01-01 PROCEDURE — 36415 COLL VENOUS BLD VENIPUNCTURE: CPT | Performed by: HOSPITALIST

## 2022-01-01 PROCEDURE — 82533 TOTAL CORTISOL: CPT | Performed by: NURSE PRACTITIONER

## 2022-01-01 PROCEDURE — U0002 COVID-19 LAB TEST NON-CDC: HCPCS | Performed by: HOSPITALIST

## 2022-01-01 PROCEDURE — 83605 ASSAY OF LACTIC ACID: CPT | Performed by: SURGERY

## 2022-01-01 PROCEDURE — 82803 BLOOD GASES ANY COMBINATION: CPT

## 2022-01-01 PROCEDURE — 85025 COMPLETE CBC W/AUTO DIFF WBC: CPT | Performed by: STUDENT IN AN ORGANIZED HEALTH CARE EDUCATION/TRAINING PROGRAM

## 2022-01-01 PROCEDURE — 36600 WITHDRAWAL OF ARTERIAL BLOOD: CPT

## 2022-01-01 PROCEDURE — 99497 PR ADVNCD CARE PLAN 30 MIN: ICD-10-PCS | Mod: 25,,, | Performed by: PHYSICIAN ASSISTANT

## 2022-01-01 PROCEDURE — 36415 COLL VENOUS BLD VENIPUNCTURE: CPT | Performed by: PHYSICIAN ASSISTANT

## 2022-01-01 PROCEDURE — 75710 ARTERY X-RAYS ARM/LEG: CPT | Performed by: SURGERY

## 2022-01-01 PROCEDURE — 88307 TISSUE EXAM BY PATHOLOGIST: CPT | Performed by: PATHOLOGY

## 2022-01-01 PROCEDURE — 87186 SC STD MICRODIL/AGAR DIL: CPT | Performed by: ORTHOPAEDIC SURGERY

## 2022-01-01 PROCEDURE — 97116 GAIT TRAINING THERAPY: CPT | Mod: CQ

## 2022-01-01 PROCEDURE — 87040 BLOOD CULTURE FOR BACTERIA: CPT | Mod: 59 | Performed by: EMERGENCY MEDICINE

## 2022-01-01 PROCEDURE — 99153 MOD SED SAME PHYS/QHP EA: CPT | Performed by: SURGERY

## 2022-01-01 PROCEDURE — 29515 APPLICATION SHORT LEG SPLINT: CPT | Mod: LT

## 2022-01-01 PROCEDURE — 87206 SMEAR FLUORESCENT/ACID STAI: CPT | Performed by: ORTHOPAEDIC SURGERY

## 2022-01-01 PROCEDURE — 36000706: Performed by: ORTHOPAEDIC SURGERY

## 2022-01-01 PROCEDURE — 81000 URINALYSIS NONAUTO W/SCOPE: CPT | Performed by: NURSE PRACTITIONER

## 2022-01-01 PROCEDURE — 83036 HEMOGLOBIN GLYCOSYLATED A1C: CPT | Performed by: FAMILY MEDICINE

## 2022-01-01 PROCEDURE — 82550 ASSAY OF CK (CPK): CPT | Performed by: INTERNAL MEDICINE

## 2022-01-01 PROCEDURE — 88307 TISSUE EXAM BY PATHOLOGIST: CPT | Mod: 26,,, | Performed by: PATHOLOGY

## 2022-01-01 PROCEDURE — 83615 LACTATE (LD) (LDH) ENZYME: CPT | Performed by: EMERGENCY MEDICINE

## 2022-01-01 PROCEDURE — 20690 APPL UNIPLN UNI EXT FIXJ SYS: CPT | Mod: ,,, | Performed by: ORTHOPAEDIC SURGERY

## 2022-01-01 PROCEDURE — 85025 COMPLETE CBC W/AUTO DIFF WBC: CPT | Performed by: ANESTHESIOLOGY

## 2022-01-01 PROCEDURE — 99223 1ST HOSP IP/OBS HIGH 75: CPT | Mod: ,,, | Performed by: SURGERY

## 2022-01-01 PROCEDURE — 36430 TRANSFUSION BLD/BLD COMPNT: CPT

## 2022-01-01 PROCEDURE — 99223 1ST HOSP IP/OBS HIGH 75: CPT | Mod: 57,AI,, | Performed by: PHYSICIAN ASSISTANT

## 2022-01-01 PROCEDURE — 83735 ASSAY OF MAGNESIUM: CPT | Mod: 91 | Performed by: NURSE PRACTITIONER

## 2022-01-01 PROCEDURE — 88300 PR  SURG PATH,GROSS,LEVEL I: ICD-10-PCS | Mod: 26,59,, | Performed by: PATHOLOGY

## 2022-01-01 PROCEDURE — C1769 GUIDE WIRE: HCPCS | Performed by: SURGERY

## 2022-01-01 PROCEDURE — 88307 PR  SURG PATH,LEVEL V: ICD-10-PCS | Mod: 26,,, | Performed by: PATHOLOGY

## 2022-01-01 PROCEDURE — 99222 PR INITIAL HOSPITAL CARE,LEVL II: ICD-10-PCS | Mod: ,,, | Performed by: INTERNAL MEDICINE

## 2022-01-01 DEVICE — IMPLANTABLE DEVICE: Type: IMPLANTABLE DEVICE | Site: TIBIA | Status: FUNCTIONAL

## 2022-01-01 DEVICE — KIT OSTEOSET RESOR BEAD 25CC: Type: IMPLANTABLE DEVICE | Site: LEG | Status: FUNCTIONAL

## 2022-01-01 DEVICE — PIN TRNSFIXTN APEX 5/6X40X300M: Type: IMPLANTABLE DEVICE | Site: TIBIA | Status: FUNCTIONAL

## 2022-01-01 DEVICE — CLAMP PIN EXT FIXATOR 4/5/6MM: Type: IMPLANTABLE DEVICE | Site: TIBIA | Status: FUNCTIONAL

## 2022-01-01 DEVICE — ROD CONNECT EX FIX TIB 11X350M: Type: IMPLANTABLE DEVICE | Site: TIBIA | Status: FUNCTIONAL

## 2022-01-01 RX ORDER — DEXAMETHASONE SODIUM PHOSPHATE 4 MG/ML
INJECTION, SOLUTION INTRA-ARTICULAR; INTRALESIONAL; INTRAMUSCULAR; INTRAVENOUS; SOFT TISSUE
Status: DISCONTINUED | OUTPATIENT
Start: 2022-01-01 | End: 2022-01-01

## 2022-01-01 RX ORDER — INSULIN ASPART 100 [IU]/ML
0-5 INJECTION, SOLUTION INTRAVENOUS; SUBCUTANEOUS
Status: DISCONTINUED | OUTPATIENT
Start: 2022-01-01 | End: 2022-01-01

## 2022-01-01 RX ORDER — HYDROCODONE BITARTRATE AND ACETAMINOPHEN 5; 325 MG/1; MG/1
1 TABLET ORAL EVERY 4 HOURS PRN
Status: DISCONTINUED | OUTPATIENT
Start: 2022-01-01 | End: 2022-01-01

## 2022-01-01 RX ORDER — CARVEDILOL 12.5 MG/1
12.5 TABLET ORAL 2 TIMES DAILY
Status: DISCONTINUED | OUTPATIENT
Start: 2022-01-01 | End: 2022-01-01

## 2022-01-01 RX ORDER — LANOLIN ALCOHOL/MO/W.PET/CERES
400 CREAM (GRAM) TOPICAL 2 TIMES DAILY
Status: DISCONTINUED | OUTPATIENT
Start: 2022-01-01 | End: 2022-01-01

## 2022-01-01 RX ORDER — SODIUM CHLORIDE 450 MG/100ML
INJECTION, SOLUTION INTRAVENOUS CONTINUOUS
Status: DISCONTINUED | OUTPATIENT
Start: 2022-01-01 | End: 2022-01-01

## 2022-01-01 RX ORDER — POTASSIUM CHLORIDE 20 MEQ/1
40 TABLET, EXTENDED RELEASE ORAL 2 TIMES DAILY
Status: DISPENSED | OUTPATIENT
Start: 2022-01-01 | End: 2022-01-01

## 2022-01-01 RX ORDER — MORPHINE SULFATE 4 MG/ML
2 INJECTION, SOLUTION INTRAMUSCULAR; INTRAVENOUS EVERY 4 HOURS PRN
Status: DISCONTINUED | OUTPATIENT
Start: 2022-01-01 | End: 2022-01-01

## 2022-01-01 RX ORDER — ONDANSETRON 8 MG/1
8 TABLET, ORALLY DISINTEGRATING ORAL EVERY 8 HOURS PRN
Status: DISCONTINUED | OUTPATIENT
Start: 2022-01-01 | End: 2022-01-01 | Stop reason: HOSPADM

## 2022-01-01 RX ORDER — ACETAMINOPHEN 325 MG/1
650 TABLET ORAL EVERY 8 HOURS PRN
Status: DISCONTINUED | OUTPATIENT
Start: 2022-01-01 | End: 2022-01-01 | Stop reason: HOSPADM

## 2022-01-01 RX ORDER — TACROLIMUS 1 MG/1
1 CAPSULE ORAL EVERY EVENING
Status: DISCONTINUED | OUTPATIENT
Start: 2022-01-01 | End: 2022-01-01 | Stop reason: HOSPADM

## 2022-01-01 RX ORDER — CEFAZOLIN SODIUM 1 G/3ML
INJECTION, POWDER, FOR SOLUTION INTRAMUSCULAR; INTRAVENOUS
Status: DISCONTINUED | OUTPATIENT
Start: 2022-01-01 | End: 2022-01-01 | Stop reason: HOSPADM

## 2022-01-01 RX ORDER — SODIUM CHLORIDE 9 MG/ML
INJECTION, SOLUTION INTRAVENOUS CONTINUOUS
Status: ACTIVE | OUTPATIENT
Start: 2022-01-01 | End: 2022-01-01

## 2022-01-01 RX ORDER — TACROLIMUS 1 MG/1
1 CAPSULE ORAL 2 TIMES DAILY
Status: DISCONTINUED | OUTPATIENT
Start: 2022-01-01 | End: 2022-01-01

## 2022-01-01 RX ORDER — EPHEDRINE SULFATE 50 MG/ML
INJECTION, SOLUTION INTRAVENOUS
Status: DISCONTINUED | OUTPATIENT
Start: 2022-01-01 | End: 2022-01-01

## 2022-01-01 RX ORDER — IBUPROFEN 200 MG
24 TABLET ORAL
Status: DISCONTINUED | OUTPATIENT
Start: 2022-01-01 | End: 2022-01-01 | Stop reason: HOSPADM

## 2022-01-01 RX ORDER — ACETAMINOPHEN 500 MG
1000 TABLET ORAL 3 TIMES DAILY
Refills: 0
Start: 2022-01-01

## 2022-01-01 RX ORDER — MORPHINE SULFATE 4 MG/ML
4 INJECTION, SOLUTION INTRAMUSCULAR; INTRAVENOUS EVERY 4 HOURS PRN
Status: DISCONTINUED | OUTPATIENT
Start: 2022-01-01 | End: 2022-01-01

## 2022-01-01 RX ORDER — ONDANSETRON 2 MG/ML
INJECTION INTRAMUSCULAR; INTRAVENOUS
Status: DISCONTINUED | OUTPATIENT
Start: 2022-01-01 | End: 2022-01-01

## 2022-01-01 RX ORDER — SODIUM CHLORIDE 0.9 % (FLUSH) 0.9 %
10 SYRINGE (ML) INJECTION
Status: DISCONTINUED | OUTPATIENT
Start: 2022-01-01 | End: 2022-01-01 | Stop reason: HOSPADM

## 2022-01-01 RX ORDER — OXYCODONE AND ACETAMINOPHEN 5; 325 MG/1; MG/1
1 TABLET ORAL
Status: DISCONTINUED | OUTPATIENT
Start: 2022-01-01 | End: 2022-01-01 | Stop reason: HOSPADM

## 2022-01-01 RX ORDER — MYCOPHENOLATE MOFETIL 250 MG/1
250 CAPSULE ORAL 2 TIMES DAILY
Status: DISCONTINUED | OUTPATIENT
Start: 2022-01-01 | End: 2022-01-01

## 2022-01-01 RX ORDER — SODIUM CHLORIDE 9 MG/ML
INJECTION, SOLUTION INTRAVENOUS CONTINUOUS
Status: DISCONTINUED | OUTPATIENT
Start: 2022-01-01 | End: 2022-01-01

## 2022-01-01 RX ORDER — ATORVASTATIN CALCIUM 40 MG/1
80 TABLET, FILM COATED ORAL DAILY
Status: DISCONTINUED | OUTPATIENT
Start: 2022-01-01 | End: 2022-01-01

## 2022-01-01 RX ORDER — INSULIN ASPART 100 [IU]/ML
4 INJECTION, SOLUTION INTRAVENOUS; SUBCUTANEOUS
Status: DISCONTINUED | OUTPATIENT
Start: 2022-01-01 | End: 2022-01-01 | Stop reason: HOSPADM

## 2022-01-01 RX ORDER — HYDROMORPHONE HYDROCHLORIDE 2 MG/ML
0.2 INJECTION, SOLUTION INTRAMUSCULAR; INTRAVENOUS; SUBCUTANEOUS EVERY 5 MIN PRN
Status: DISCONTINUED | OUTPATIENT
Start: 2022-01-01 | End: 2022-01-01 | Stop reason: HOSPADM

## 2022-01-01 RX ORDER — FLUDROCORTISONE ACETATE 0.1 MG/1
100 TABLET ORAL DAILY
Status: DISCONTINUED | OUTPATIENT
Start: 2022-01-01 | End: 2022-01-01

## 2022-01-01 RX ORDER — SERTRALINE HYDROCHLORIDE 25 MG/1
25 TABLET, FILM COATED ORAL DAILY
Status: DISCONTINUED | OUTPATIENT
Start: 2022-01-01 | End: 2022-01-01 | Stop reason: HOSPADM

## 2022-01-01 RX ORDER — ASPIRIN 81 MG/1
81 TABLET ORAL DAILY
Status: DISCONTINUED | OUTPATIENT
Start: 2022-01-01 | End: 2022-01-01 | Stop reason: HOSPADM

## 2022-01-01 RX ORDER — MORPHINE SULFATE 4 MG/ML
4 INJECTION, SOLUTION INTRAMUSCULAR; INTRAVENOUS
Status: COMPLETED | OUTPATIENT
Start: 2022-01-01 | End: 2022-01-01

## 2022-01-01 RX ORDER — CEFAZOLIN SODIUM 1 G/3ML
INJECTION, POWDER, FOR SOLUTION INTRAMUSCULAR; INTRAVENOUS
Status: DISCONTINUED | OUTPATIENT
Start: 2022-01-01 | End: 2022-01-01

## 2022-01-01 RX ORDER — SUCCINYLCHOLINE CHLORIDE 20 MG/ML
INJECTION INTRAMUSCULAR; INTRAVENOUS
Status: DISCONTINUED | OUTPATIENT
Start: 2022-01-01 | End: 2022-01-01

## 2022-01-01 RX ORDER — ATORVASTATIN CALCIUM 80 MG/1
80 TABLET, FILM COATED ORAL DAILY
COMMUNITY

## 2022-01-01 RX ORDER — HYDRALAZINE HYDROCHLORIDE 20 MG/ML
10 INJECTION INTRAMUSCULAR; INTRAVENOUS EVERY 4 HOURS PRN
Status: DISCONTINUED | OUTPATIENT
Start: 2022-01-01 | End: 2022-01-01 | Stop reason: HOSPADM

## 2022-01-01 RX ORDER — METHOCARBAMOL 500 MG/1
500 TABLET, FILM COATED ORAL 4 TIMES DAILY
Qty: 40 TABLET | Refills: 0
Start: 2022-01-01 | End: 2022-01-01

## 2022-01-01 RX ORDER — MAGNESIUM SULFATE HEPTAHYDRATE 40 MG/ML
2 INJECTION, SOLUTION INTRAVENOUS ONCE
Status: COMPLETED | OUTPATIENT
Start: 2022-01-01 | End: 2022-01-01

## 2022-01-01 RX ORDER — SODIUM CHLORIDE 9 MG/ML
INJECTION, SOLUTION INTRAVENOUS ONCE
Status: COMPLETED | OUTPATIENT
Start: 2022-01-01 | End: 2022-01-01

## 2022-01-01 RX ORDER — LINEZOLID 2 MG/ML
600 INJECTION, SOLUTION INTRAVENOUS
Status: DISCONTINUED | OUTPATIENT
Start: 2022-01-01 | End: 2022-01-01

## 2022-01-01 RX ORDER — HYDROCODONE BITARTRATE AND ACETAMINOPHEN 5; 325 MG/1; MG/1
2 TABLET ORAL EVERY 4 HOURS PRN
Status: DISCONTINUED | OUTPATIENT
Start: 2022-01-01 | End: 2022-01-01

## 2022-01-01 RX ORDER — HYDROCODONE BITARTRATE AND ACETAMINOPHEN 5; 325 MG/1; MG/1
1 TABLET ORAL EVERY 4 HOURS PRN
Status: DISCONTINUED | OUTPATIENT
Start: 2022-01-01 | End: 2022-01-01 | Stop reason: HOSPADM

## 2022-01-01 RX ORDER — METOPROLOL TARTRATE 25 MG/1
25 TABLET, FILM COATED ORAL 2 TIMES DAILY
Status: DISCONTINUED | OUTPATIENT
Start: 2022-01-01 | End: 2022-01-01

## 2022-01-01 RX ORDER — SODIUM CHLORIDE 9 MG/ML
INJECTION, SOLUTION INTRAVENOUS
Status: DISCONTINUED | OUTPATIENT
Start: 2022-01-01 | End: 2022-01-01

## 2022-01-01 RX ORDER — PROCHLORPERAZINE EDISYLATE 5 MG/ML
5 INJECTION INTRAMUSCULAR; INTRAVENOUS EVERY 6 HOURS PRN
Status: DISCONTINUED | OUTPATIENT
Start: 2022-01-01 | End: 2022-01-01 | Stop reason: HOSPADM

## 2022-01-01 RX ORDER — NITROGLYCERIN 0.4 MG/1
0.4 TABLET SUBLINGUAL EVERY 5 MIN PRN
Status: DISCONTINUED | OUTPATIENT
Start: 2022-01-01 | End: 2022-01-01 | Stop reason: HOSPADM

## 2022-01-01 RX ORDER — HYDROMORPHONE HYDROCHLORIDE 1 MG/ML
1 INJECTION, SOLUTION INTRAMUSCULAR; INTRAVENOUS; SUBCUTANEOUS EVERY 4 HOURS PRN
Status: DISCONTINUED | OUTPATIENT
Start: 2022-01-01 | End: 2022-01-01 | Stop reason: RX

## 2022-01-01 RX ORDER — MUPIROCIN 20 MG/G
OINTMENT TOPICAL DAILY
Status: DISCONTINUED | OUTPATIENT
Start: 2022-01-01 | End: 2022-01-01 | Stop reason: HOSPADM

## 2022-01-01 RX ORDER — TACROLIMUS 0.5 MG/1
0.5 CAPSULE ORAL ONCE
Status: COMPLETED | OUTPATIENT
Start: 2022-01-01 | End: 2022-01-01

## 2022-01-01 RX ORDER — HYDROCODONE BITARTRATE AND ACETAMINOPHEN 5; 325 MG/1; MG/1
1 TABLET ORAL EVERY 6 HOURS PRN
Status: DISCONTINUED | OUTPATIENT
Start: 2022-01-01 | End: 2022-01-01

## 2022-01-01 RX ORDER — SCOLOPAMINE TRANSDERMAL SYSTEM 1 MG/1
1 PATCH, EXTENDED RELEASE TRANSDERMAL
Status: COMPLETED | OUTPATIENT
Start: 2022-01-01 | End: 2022-01-01

## 2022-01-01 RX ORDER — CARVEDILOL 12.5 MG/1
12.5 TABLET ORAL 2 TIMES DAILY
Status: DISCONTINUED | OUTPATIENT
Start: 2022-01-01 | End: 2022-01-01 | Stop reason: HOSPADM

## 2022-01-01 RX ORDER — GLUCAGON 1 MG
1 KIT INJECTION
Status: DISCONTINUED | OUTPATIENT
Start: 2022-01-01 | End: 2022-01-01 | Stop reason: HOSPADM

## 2022-01-01 RX ORDER — FLUTICASONE PROPIONATE 50 MCG
2 SPRAY, SUSPENSION (ML) NASAL DAILY
Status: DISCONTINUED | OUTPATIENT
Start: 2022-01-01 | End: 2022-01-01 | Stop reason: HOSPADM

## 2022-01-01 RX ORDER — MIDODRINE HYDROCHLORIDE 5 MG/1
5 TABLET ORAL
Status: DISCONTINUED | OUTPATIENT
Start: 2022-01-01 | End: 2022-01-01

## 2022-01-01 RX ORDER — AMLODIPINE BESYLATE 10 MG/1
10 TABLET ORAL DAILY
COMMUNITY

## 2022-01-01 RX ORDER — LIDOCAINE HYDROCHLORIDE 20 MG/ML
INJECTION INTRAVENOUS
Status: DISCONTINUED | OUTPATIENT
Start: 2022-01-01 | End: 2022-01-01

## 2022-01-01 RX ORDER — INSULIN ASPART 100 [IU]/ML
10 INJECTION, SOLUTION INTRAVENOUS; SUBCUTANEOUS ONCE
Status: DISCONTINUED | OUTPATIENT
Start: 2022-01-01 | End: 2022-01-01

## 2022-01-01 RX ORDER — PROPOFOL 10 MG/ML
VIAL (ML) INTRAVENOUS
Status: DISCONTINUED | OUTPATIENT
Start: 2022-01-01 | End: 2022-01-01

## 2022-01-01 RX ORDER — SIMETHICONE 80 MG
2 TABLET,CHEWABLE ORAL 3 TIMES DAILY PRN
Status: DISCONTINUED | OUTPATIENT
Start: 2022-01-01 | End: 2022-01-01 | Stop reason: HOSPADM

## 2022-01-01 RX ORDER — LIDOCAINE 50 MG/G
1 PATCH TOPICAL DAILY PRN
COMMUNITY
Start: 2022-01-01 | End: 2022-01-01 | Stop reason: ALTCHOICE

## 2022-01-01 RX ORDER — SIMETHICONE 80 MG
1 TABLET,CHEWABLE ORAL 4 TIMES DAILY PRN
Status: DISCONTINUED | OUTPATIENT
Start: 2022-01-01 | End: 2022-01-01

## 2022-01-01 RX ORDER — INSULIN ASPART 100 [IU]/ML
1-10 INJECTION, SOLUTION INTRAVENOUS; SUBCUTANEOUS
Status: DISCONTINUED | OUTPATIENT
Start: 2022-01-01 | End: 2022-01-01

## 2022-01-01 RX ORDER — POTASSIUM CHLORIDE 20 MEQ/1
40 TABLET, EXTENDED RELEASE ORAL ONCE
Status: COMPLETED | OUTPATIENT
Start: 2022-01-01 | End: 2022-01-01

## 2022-01-01 RX ORDER — BACITRACIN ZINC 500 UNIT/G
OINTMENT (GRAM) TOPICAL
Status: DISCONTINUED | OUTPATIENT
Start: 2022-01-01 | End: 2022-01-01 | Stop reason: HOSPADM

## 2022-01-01 RX ORDER — INSULIN ASPART 100 [IU]/ML
1-10 INJECTION, SOLUTION INTRAVENOUS; SUBCUTANEOUS EVERY 6 HOURS PRN
Status: DISCONTINUED | OUTPATIENT
Start: 2022-01-01 | End: 2022-01-01 | Stop reason: HOSPADM

## 2022-01-01 RX ORDER — HYDROMORPHONE HYDROCHLORIDE 2 MG/1
2 TABLET ORAL
Status: DISCONTINUED | OUTPATIENT
Start: 2022-01-01 | End: 2022-01-01 | Stop reason: HOSPADM

## 2022-01-01 RX ORDER — TACROLIMUS 0.5 MG/1
0.5 CAPSULE ORAL EVERY EVENING
Status: DISCONTINUED | OUTPATIENT
Start: 2022-01-01 | End: 2022-01-01

## 2022-01-01 RX ORDER — GUAIFENESIN 100 MG/5ML
200 SOLUTION ORAL EVERY 4 HOURS PRN
Status: DISCONTINUED | OUTPATIENT
Start: 2022-01-01 | End: 2022-01-01 | Stop reason: HOSPADM

## 2022-01-01 RX ORDER — NOREPINEPHRINE BITARTRATE/D5W 4MG/250ML
0-3 PLASTIC BAG, INJECTION (ML) INTRAVENOUS CONTINUOUS
Status: DISCONTINUED | OUTPATIENT
Start: 2022-01-01 | End: 2022-01-01

## 2022-01-01 RX ORDER — SODIUM BICARBONATE 650 MG/1
650 TABLET ORAL 3 TIMES DAILY
Status: DISCONTINUED | OUTPATIENT
Start: 2022-01-01 | End: 2022-01-01 | Stop reason: HOSPADM

## 2022-01-01 RX ORDER — CEFEPIME HYDROCHLORIDE 1 G/50ML
1 INJECTION, SOLUTION INTRAVENOUS
Status: DISCONTINUED | OUTPATIENT
Start: 2022-01-01 | End: 2022-01-01 | Stop reason: HOSPADM

## 2022-01-01 RX ORDER — MAG HYDROX/ALUMINUM HYD/SIMETH 200-200-20
30 SUSPENSION, ORAL (FINAL DOSE FORM) ORAL 4 TIMES DAILY PRN
Status: DISCONTINUED | OUTPATIENT
Start: 2022-01-01 | End: 2022-01-01 | Stop reason: HOSPADM

## 2022-01-01 RX ORDER — FUROSEMIDE 10 MG/ML
20 INJECTION INTRAMUSCULAR; INTRAVENOUS ONCE
Status: COMPLETED | OUTPATIENT
Start: 2022-01-01 | End: 2022-01-01

## 2022-01-01 RX ORDER — LIDOCAINE 50 MG/G
1 PATCH TOPICAL
Status: DISCONTINUED | OUTPATIENT
Start: 2022-01-01 | End: 2022-01-01 | Stop reason: HOSPADM

## 2022-01-01 RX ORDER — AMLODIPINE BESYLATE 10 MG/1
10 TABLET ORAL DAILY
Status: DISCONTINUED | OUTPATIENT
Start: 2022-01-01 | End: 2022-01-01 | Stop reason: HOSPADM

## 2022-01-01 RX ORDER — CARVEDILOL 12.5 MG/1
12.5 TABLET ORAL 2 TIMES DAILY
Qty: 60 TABLET | Refills: 11 | Status: SHIPPED | OUTPATIENT
Start: 2022-01-01 | End: 2023-01-01

## 2022-01-01 RX ORDER — MIDODRINE HYDROCHLORIDE 5 MG/1
10 TABLET ORAL EVERY 8 HOURS
Status: DISCONTINUED | OUTPATIENT
Start: 2022-01-01 | End: 2022-01-01

## 2022-01-01 RX ORDER — CARVEDILOL 6.25 MG/1
6.25 TABLET ORAL 2 TIMES DAILY
Status: DISCONTINUED | OUTPATIENT
Start: 2022-01-01 | End: 2022-01-01

## 2022-01-01 RX ORDER — CEFAZOLIN SODIUM 2 G/50ML
2 SOLUTION INTRAVENOUS
Status: COMPLETED | OUTPATIENT
Start: 2022-01-01 | End: 2022-01-01

## 2022-01-01 RX ORDER — GABAPENTIN 300 MG/1
300 CAPSULE ORAL 3 TIMES DAILY
Status: DISCONTINUED | OUTPATIENT
Start: 2022-01-01 | End: 2022-01-01 | Stop reason: HOSPADM

## 2022-01-01 RX ORDER — SODIUM BICARBONATE 650 MG/1
1300 TABLET ORAL 3 TIMES DAILY
Status: DISCONTINUED | OUTPATIENT
Start: 2022-01-01 | End: 2022-01-01

## 2022-01-01 RX ORDER — ASCORBIC ACID 500 MG
500 TABLET ORAL 2 TIMES DAILY
Status: DISCONTINUED | OUTPATIENT
Start: 2022-01-01 | End: 2022-01-01 | Stop reason: HOSPADM

## 2022-01-01 RX ORDER — HYDROCODONE BITARTRATE AND ACETAMINOPHEN 500; 5 MG/1; MG/1
TABLET ORAL
Status: DISCONTINUED | OUTPATIENT
Start: 2022-01-01 | End: 2022-01-01 | Stop reason: HOSPADM

## 2022-01-01 RX ORDER — SODIUM CHLORIDE 9 MG/ML
INJECTION, SOLUTION INTRAVENOUS
Status: DISCONTINUED | OUTPATIENT
Start: 2022-01-01 | End: 2022-01-01 | Stop reason: HOSPADM

## 2022-01-01 RX ORDER — OXYCODONE HYDROCHLORIDE 15 MG/1
10 TABLET ORAL EVERY 4 HOURS PRN
Status: ON HOLD | COMMUNITY
End: 2022-01-01 | Stop reason: HOSPADM

## 2022-01-01 RX ORDER — LIDOCAINE HYDROCHLORIDE 10 MG/ML
INJECTION, SOLUTION EPIDURAL; INFILTRATION; INTRACAUDAL; PERINEURAL
Status: DISCONTINUED | OUTPATIENT
Start: 2022-01-01 | End: 2022-01-01

## 2022-01-01 RX ORDER — ONDANSETRON 2 MG/ML
4 INJECTION INTRAMUSCULAR; INTRAVENOUS
Status: COMPLETED | OUTPATIENT
Start: 2022-01-01 | End: 2022-01-01

## 2022-01-01 RX ORDER — TACROLIMUS 1 MG/1
1 CAPSULE ORAL EVERY MORNING
Status: DISCONTINUED | OUTPATIENT
Start: 2022-01-01 | End: 2022-01-01 | Stop reason: HOSPADM

## 2022-01-01 RX ORDER — HYDROMORPHONE HYDROCHLORIDE 1 MG/ML
0.5 INJECTION, SOLUTION INTRAMUSCULAR; INTRAVENOUS; SUBCUTANEOUS EVERY 4 HOURS PRN
Status: DISCONTINUED | OUTPATIENT
Start: 2022-01-01 | End: 2022-01-01

## 2022-01-01 RX ORDER — OXYCODONE AND ACETAMINOPHEN 7.5; 325 MG/1; MG/1
1 TABLET ORAL EVERY 6 HOURS PRN
Status: DISCONTINUED | OUTPATIENT
Start: 2022-01-01 | End: 2022-01-01 | Stop reason: HOSPADM

## 2022-01-01 RX ORDER — CEFEPIME HYDROCHLORIDE 1 G/50ML
2 INJECTION, SOLUTION INTRAVENOUS
Status: DISCONTINUED | OUTPATIENT
Start: 2022-01-01 | End: 2022-01-01

## 2022-01-01 RX ORDER — ROCURONIUM BROMIDE 10 MG/ML
INJECTION, SOLUTION INTRAVENOUS
Status: DISCONTINUED | OUTPATIENT
Start: 2022-01-01 | End: 2022-01-01

## 2022-01-01 RX ORDER — TALC
6 POWDER (GRAM) TOPICAL NIGHTLY PRN
Status: DISCONTINUED | OUTPATIENT
Start: 2022-01-01 | End: 2022-01-01 | Stop reason: HOSPADM

## 2022-01-01 RX ORDER — POTASSIUM CHLORIDE 20 MEQ/1
40 TABLET, EXTENDED RELEASE ORAL EVERY 4 HOURS
Status: COMPLETED | OUTPATIENT
Start: 2022-01-01 | End: 2022-01-01

## 2022-01-01 RX ORDER — PHENYLEPHRINE HYDROCHLORIDE 10 MG/ML
INJECTION INTRAVENOUS
Status: DISCONTINUED | OUTPATIENT
Start: 2022-01-01 | End: 2022-01-01

## 2022-01-01 RX ORDER — CHLORHEXIDINE GLUCONATE ORAL RINSE 1.2 MG/ML
10 SOLUTION DENTAL 2 TIMES DAILY
Status: COMPLETED | OUTPATIENT
Start: 2022-01-01 | End: 2022-01-01

## 2022-01-01 RX ORDER — IPRATROPIUM BROMIDE AND ALBUTEROL SULFATE 2.5; .5 MG/3ML; MG/3ML
3 SOLUTION RESPIRATORY (INHALATION) EVERY 6 HOURS PRN
Status: DISCONTINUED | OUTPATIENT
Start: 2022-01-01 | End: 2022-01-01 | Stop reason: HOSPADM

## 2022-01-01 RX ORDER — TACROLIMUS 1 MG/1
1 CAPSULE ORAL 2 TIMES DAILY
Status: DISCONTINUED | OUTPATIENT
Start: 2022-01-01 | End: 2022-01-01 | Stop reason: HOSPADM

## 2022-01-01 RX ORDER — LANOLIN ALCOHOL/MO/W.PET/CERES
800 CREAM (GRAM) TOPICAL
Status: DISCONTINUED | OUTPATIENT
Start: 2022-01-01 | End: 2022-01-01

## 2022-01-01 RX ORDER — TACROLIMUS 0.5 MG/1
0.5 CAPSULE ORAL EVERY EVENING
Status: DISCONTINUED | OUTPATIENT
Start: 2022-01-01 | End: 2022-01-01 | Stop reason: HOSPADM

## 2022-01-01 RX ORDER — CLOPIDOGREL BISULFATE 75 MG/1
75 TABLET ORAL DAILY
Status: DISCONTINUED | OUTPATIENT
Start: 2022-01-01 | End: 2022-01-01

## 2022-01-01 RX ORDER — MORPHINE SULFATE 4 MG/ML
4 INJECTION, SOLUTION INTRAMUSCULAR; INTRAVENOUS ONCE
Status: COMPLETED | OUTPATIENT
Start: 2022-01-01 | End: 2022-01-01

## 2022-01-01 RX ORDER — ATORVASTATIN CALCIUM 40 MG/1
80 TABLET, FILM COATED ORAL DAILY
Status: DISCONTINUED | OUTPATIENT
Start: 2022-01-01 | End: 2022-01-01 | Stop reason: HOSPADM

## 2022-01-01 RX ORDER — MIDODRINE HYDROCHLORIDE 5 MG/1
5 TABLET ORAL EVERY 8 HOURS
Status: DISCONTINUED | OUTPATIENT
Start: 2022-01-01 | End: 2022-01-01

## 2022-01-01 RX ORDER — CEFEPIME HYDROCHLORIDE 1 G/50ML
1 INJECTION, SOLUTION INTRAVENOUS
Status: DISCONTINUED | OUTPATIENT
Start: 2022-01-01 | End: 2022-01-01

## 2022-01-01 RX ORDER — HYDROMORPHONE HYDROCHLORIDE 2 MG/ML
1 INJECTION, SOLUTION INTRAMUSCULAR; INTRAVENOUS; SUBCUTANEOUS EVERY 4 HOURS PRN
Status: DISCONTINUED | OUTPATIENT
Start: 2022-01-01 | End: 2022-01-01

## 2022-01-01 RX ORDER — ACETAMINOPHEN 500 MG
1000 TABLET ORAL 3 TIMES DAILY
Status: DISCONTINUED | OUTPATIENT
Start: 2022-01-01 | End: 2022-01-01 | Stop reason: HOSPADM

## 2022-01-01 RX ORDER — DICYCLOMINE HYDROCHLORIDE 10 MG/1
10 CAPSULE ORAL
Status: DISCONTINUED | OUTPATIENT
Start: 2022-01-01 | End: 2022-01-01

## 2022-01-01 RX ORDER — FAMOTIDINE 20 MG/1
20 TABLET, FILM COATED ORAL DAILY
Status: DISCONTINUED | OUTPATIENT
Start: 2022-01-01 | End: 2022-01-01

## 2022-01-01 RX ORDER — IPRATROPIUM BROMIDE AND ALBUTEROL SULFATE 2.5; .5 MG/3ML; MG/3ML
3 SOLUTION RESPIRATORY (INHALATION) EVERY 4 HOURS PRN
Status: DISCONTINUED | OUTPATIENT
Start: 2022-01-01 | End: 2022-01-01 | Stop reason: HOSPADM

## 2022-01-01 RX ORDER — MUPIROCIN 20 MG/G
OINTMENT TOPICAL 2 TIMES DAILY
Status: DISCONTINUED | OUTPATIENT
Start: 2022-01-01 | End: 2022-01-01 | Stop reason: HOSPADM

## 2022-01-01 RX ORDER — CLOPIDOGREL BISULFATE 75 MG/1
75 TABLET ORAL DAILY
Qty: 30 TABLET | Refills: 0
Start: 2022-01-01 | End: 2023-08-12

## 2022-01-01 RX ORDER — MEPERIDINE HYDROCHLORIDE 25 MG/ML
12.5 INJECTION INTRAMUSCULAR; INTRAVENOUS; SUBCUTANEOUS ONCE AS NEEDED
Status: COMPLETED | OUTPATIENT
Start: 2022-01-01 | End: 2022-01-01

## 2022-01-01 RX ORDER — CHOLESTYRAMINE 4 G/9G
1 POWDER, FOR SUSPENSION ORAL 2 TIMES DAILY
Status: DISCONTINUED | OUTPATIENT
Start: 2022-01-01 | End: 2022-01-01 | Stop reason: HOSPADM

## 2022-01-01 RX ORDER — HALOPERIDOL 5 MG/ML
2 INJECTION INTRAMUSCULAR EVERY 4 HOURS PRN
Status: DISCONTINUED | OUTPATIENT
Start: 2022-01-01 | End: 2022-01-01 | Stop reason: HOSPADM

## 2022-01-01 RX ORDER — MORPHINE SULFATE 4 MG/ML
3 INJECTION, SOLUTION INTRAMUSCULAR; INTRAVENOUS
Status: DISCONTINUED | OUTPATIENT
Start: 2022-01-01 | End: 2022-01-01 | Stop reason: HOSPADM

## 2022-01-01 RX ORDER — ONDANSETRON 2 MG/ML
4 INJECTION INTRAMUSCULAR; INTRAVENOUS EVERY 8 HOURS
Status: DISCONTINUED | OUTPATIENT
Start: 2022-01-01 | End: 2022-01-01 | Stop reason: HOSPADM

## 2022-01-01 RX ORDER — SODIUM CHLORIDE, SODIUM LACTATE, POTASSIUM CHLORIDE, CALCIUM CHLORIDE 600; 310; 30; 20 MG/100ML; MG/100ML; MG/100ML; MG/100ML
INJECTION, SOLUTION INTRAVENOUS CONTINUOUS
Status: DISCONTINUED | OUTPATIENT
Start: 2022-01-01 | End: 2022-01-01

## 2022-01-01 RX ORDER — ONDANSETRON 2 MG/ML
4 INJECTION INTRAMUSCULAR; INTRAVENOUS DAILY PRN
Status: DISCONTINUED | OUTPATIENT
Start: 2022-01-01 | End: 2022-01-01 | Stop reason: HOSPADM

## 2022-01-01 RX ORDER — LANOLIN ALCOHOL/MO/W.PET/CERES
400 CREAM (GRAM) TOPICAL 2 TIMES DAILY
Status: DISCONTINUED | OUTPATIENT
Start: 2022-01-01 | End: 2022-01-01 | Stop reason: SDUPTHER

## 2022-01-01 RX ORDER — HYDROCODONE BITARTRATE AND ACETAMINOPHEN 10; 325 MG/1; MG/1
1 TABLET ORAL EVERY 6 HOURS PRN
Status: DISCONTINUED | OUTPATIENT
Start: 2022-01-01 | End: 2022-01-01

## 2022-01-01 RX ORDER — NALOXONE HCL 0.4 MG/ML
0.02 VIAL (ML) INJECTION
Status: DISCONTINUED | OUTPATIENT
Start: 2022-01-01 | End: 2022-01-01 | Stop reason: HOSPADM

## 2022-01-01 RX ORDER — CLOPIDOGREL BISULFATE 75 MG/1
75 TABLET ORAL DAILY
Status: DISCONTINUED | OUTPATIENT
Start: 2022-01-01 | End: 2022-01-01 | Stop reason: HOSPADM

## 2022-01-01 RX ORDER — HEPARIN SODIUM 5000 [USP'U]/ML
5000 INJECTION, SOLUTION INTRAVENOUS; SUBCUTANEOUS EVERY 8 HOURS
Status: DISPENSED | OUTPATIENT
Start: 2022-01-01 | End: 2022-01-01

## 2022-01-01 RX ORDER — METHOCARBAMOL 500 MG/1
500 TABLET, FILM COATED ORAL 4 TIMES DAILY
Status: DISCONTINUED | OUTPATIENT
Start: 2022-01-01 | End: 2022-01-01 | Stop reason: HOSPADM

## 2022-01-01 RX ORDER — MORPHINE SULFATE 4 MG/ML
2 INJECTION, SOLUTION INTRAMUSCULAR; INTRAVENOUS
Status: DISCONTINUED | OUTPATIENT
Start: 2022-01-01 | End: 2022-01-01

## 2022-01-01 RX ORDER — ONDANSETRON 2 MG/ML
4 INJECTION INTRAMUSCULAR; INTRAVENOUS EVERY 6 HOURS PRN
Status: DISCONTINUED | OUTPATIENT
Start: 2022-01-01 | End: 2022-01-01 | Stop reason: HOSPADM

## 2022-01-01 RX ORDER — OXYCODONE HYDROCHLORIDE 15 MG/1
15 TABLET ORAL EVERY 4 HOURS PRN
Qty: 28 TABLET | Refills: 0 | Status: SHIPPED | OUTPATIENT
Start: 2022-01-01 | End: 2022-01-01

## 2022-01-01 RX ORDER — FUROSEMIDE 10 MG/ML
100 INJECTION INTRAMUSCULAR; INTRAVENOUS ONCE
Status: COMPLETED | OUTPATIENT
Start: 2022-01-01 | End: 2022-01-01

## 2022-01-01 RX ORDER — METRONIDAZOLE 500 MG/1
500 TABLET ORAL EVERY 8 HOURS
Status: DISCONTINUED | OUTPATIENT
Start: 2022-01-01 | End: 2022-01-01

## 2022-01-01 RX ORDER — ACETAMINOPHEN 325 MG/1
650 TABLET ORAL EVERY 4 HOURS PRN
Status: DISCONTINUED | OUTPATIENT
Start: 2022-01-01 | End: 2022-01-01 | Stop reason: HOSPADM

## 2022-01-01 RX ORDER — LORAZEPAM 2 MG/ML
0.5 INJECTION INTRAMUSCULAR
Status: DISCONTINUED | OUTPATIENT
Start: 2022-01-01 | End: 2022-01-01

## 2022-01-01 RX ORDER — INSULIN ASPART 100 [IU]/ML
8 INJECTION, SOLUTION INTRAVENOUS; SUBCUTANEOUS ONCE
Status: COMPLETED | OUTPATIENT
Start: 2022-01-01 | End: 2022-01-01

## 2022-01-01 RX ORDER — FUROSEMIDE 10 MG/ML
40 INJECTION INTRAMUSCULAR; INTRAVENOUS
Status: DISCONTINUED | OUTPATIENT
Start: 2022-01-01 | End: 2022-01-01

## 2022-01-01 RX ORDER — HEPARIN SODIUM 5000 [USP'U]/ML
5000 INJECTION, SOLUTION INTRAVENOUS; SUBCUTANEOUS EVERY 12 HOURS
Status: DISCONTINUED | OUTPATIENT
Start: 2022-01-01 | End: 2022-01-01 | Stop reason: HOSPADM

## 2022-01-01 RX ORDER — MAGNESIUM SULFATE 1 G/100ML
1 INJECTION INTRAVENOUS ONCE
Status: COMPLETED | OUTPATIENT
Start: 2022-01-01 | End: 2022-01-01

## 2022-01-01 RX ORDER — ASCORBIC ACID 500 MG
500 TABLET ORAL NIGHTLY
Qty: 30 TABLET | Refills: 0 | Status: SHIPPED | OUTPATIENT
Start: 2022-01-01 | End: 2022-01-01

## 2022-01-01 RX ORDER — MUPIROCIN 20 MG/G
OINTMENT TOPICAL 2 TIMES DAILY
Status: DISCONTINUED | OUTPATIENT
Start: 2022-01-01 | End: 2022-01-01 | Stop reason: SDUPTHER

## 2022-01-01 RX ORDER — ASCORBIC ACID 500 MG
500 TABLET ORAL NIGHTLY
Status: DISCONTINUED | OUTPATIENT
Start: 2022-01-01 | End: 2022-01-01 | Stop reason: HOSPADM

## 2022-01-01 RX ORDER — DIPHENHYDRAMINE HYDROCHLORIDE 50 MG/ML
INJECTION INTRAMUSCULAR; INTRAVENOUS
Status: DISCONTINUED | OUTPATIENT
Start: 2022-01-01 | End: 2022-01-01 | Stop reason: HOSPADM

## 2022-01-01 RX ORDER — NALOXONE HCL 0.4 MG/ML
0.4 VIAL (ML) INJECTION
Status: DISCONTINUED | OUTPATIENT
Start: 2022-01-01 | End: 2022-01-01 | Stop reason: HOSPADM

## 2022-01-01 RX ORDER — INSULIN ASPART 100 [IU]/ML
1-10 INJECTION, SOLUTION INTRAVENOUS; SUBCUTANEOUS
Status: DISCONTINUED | OUTPATIENT
Start: 2022-01-01 | End: 2022-01-01 | Stop reason: HOSPADM

## 2022-01-01 RX ORDER — TOLVAPTAN 15 MG/1
15 TABLET ORAL ONCE
Status: COMPLETED | OUTPATIENT
Start: 2022-01-01 | End: 2022-01-01

## 2022-01-01 RX ORDER — INSULIN ASPART 100 [IU]/ML
5 INJECTION, SOLUTION INTRAVENOUS; SUBCUTANEOUS
Status: DISCONTINUED | OUTPATIENT
Start: 2022-01-01 | End: 2022-01-01 | Stop reason: HOSPADM

## 2022-01-01 RX ORDER — SODIUM CHLORIDE 0.9 % (FLUSH) 0.9 %
10 SYRINGE (ML) INJECTION EVERY 8 HOURS PRN
Status: DISCONTINUED | OUTPATIENT
Start: 2022-01-01 | End: 2022-01-01 | Stop reason: HOSPADM

## 2022-01-01 RX ORDER — HYDRALAZINE HYDROCHLORIDE 20 MG/ML
10 INJECTION INTRAMUSCULAR; INTRAVENOUS EVERY 8 HOURS PRN
Status: DISCONTINUED | OUTPATIENT
Start: 2022-01-01 | End: 2022-01-01

## 2022-01-01 RX ORDER — HALOPERIDOL 5 MG/ML
2 INJECTION INTRAMUSCULAR ONCE
Status: COMPLETED | OUTPATIENT
Start: 2022-01-01 | End: 2022-01-01

## 2022-01-01 RX ORDER — LINEZOLID 600 MG/1
600 TABLET, FILM COATED ORAL EVERY 12 HOURS
Status: DISCONTINUED | OUTPATIENT
Start: 2022-01-01 | End: 2022-01-01

## 2022-01-01 RX ORDER — FUROSEMIDE 10 MG/ML
40 INJECTION INTRAMUSCULAR; INTRAVENOUS ONCE
Status: COMPLETED | OUTPATIENT
Start: 2022-01-01 | End: 2022-01-01

## 2022-01-01 RX ORDER — MYCOPHENOLATE MOFETIL 250 MG/1
250 CAPSULE ORAL 2 TIMES DAILY
Status: DISCONTINUED | OUTPATIENT
Start: 2022-01-01 | End: 2022-01-01 | Stop reason: HOSPADM

## 2022-01-01 RX ORDER — CEFEPIME HYDROCHLORIDE 1 G/50ML
2 INJECTION, SOLUTION INTRAVENOUS
Status: COMPLETED | OUTPATIENT
Start: 2022-01-01 | End: 2022-01-01

## 2022-01-01 RX ORDER — MIDAZOLAM HYDROCHLORIDE 1 MG/ML
INJECTION, SOLUTION INTRAMUSCULAR; INTRAVENOUS
Status: DISCONTINUED | OUTPATIENT
Start: 2022-01-01 | End: 2022-01-01

## 2022-01-01 RX ORDER — PROPOFOL 10 MG/ML
VIAL (ML) INTRAVENOUS CONTINUOUS PRN
Status: DISCONTINUED | OUTPATIENT
Start: 2022-01-01 | End: 2022-01-01

## 2022-01-01 RX ORDER — ONDANSETRON 2 MG/ML
4 INJECTION INTRAMUSCULAR; INTRAVENOUS EVERY 8 HOURS PRN
Status: DISCONTINUED | OUTPATIENT
Start: 2022-01-01 | End: 2022-01-01

## 2022-01-01 RX ORDER — MYCOPHENOLATE MOFETIL 250 MG/1
250 CAPSULE ORAL 2 TIMES DAILY
COMMUNITY

## 2022-01-01 RX ORDER — OXYCODONE AND ACETAMINOPHEN 10; 325 MG/1; MG/1
1 TABLET ORAL EVERY 4 HOURS PRN
Status: DISCONTINUED | OUTPATIENT
Start: 2022-01-01 | End: 2022-01-01

## 2022-01-01 RX ORDER — FAMOTIDINE 20 MG/1
20 TABLET, FILM COATED ORAL 2 TIMES DAILY
Status: DISCONTINUED | OUTPATIENT
Start: 2022-01-01 | End: 2022-01-01 | Stop reason: HOSPADM

## 2022-01-01 RX ORDER — IBUPROFEN 200 MG
16 TABLET ORAL
Status: DISCONTINUED | OUTPATIENT
Start: 2022-01-01 | End: 2022-01-01 | Stop reason: HOSPADM

## 2022-01-01 RX ORDER — LIDOCAINE HYDROCHLORIDE 10 MG/ML
10 INJECTION, SOLUTION EPIDURAL; INFILTRATION; INTRACAUDAL; PERINEURAL ONCE
Status: COMPLETED | OUTPATIENT
Start: 2022-01-01 | End: 2022-01-01

## 2022-01-01 RX ORDER — HYDROMORPHONE HYDROCHLORIDE 1 MG/ML
1 INJECTION, SOLUTION INTRAMUSCULAR; INTRAVENOUS; SUBCUTANEOUS ONCE
Status: DISCONTINUED | OUTPATIENT
Start: 2022-01-01 | End: 2022-01-01

## 2022-01-01 RX ORDER — FENTANYL CITRATE 50 UG/ML
INJECTION, SOLUTION INTRAMUSCULAR; INTRAVENOUS
Status: DISCONTINUED | OUTPATIENT
Start: 2022-01-01 | End: 2022-01-01

## 2022-01-01 RX ORDER — INSULIN ASPART 100 [IU]/ML
10 INJECTION, SOLUTION INTRAVENOUS; SUBCUTANEOUS ONCE
Status: COMPLETED | OUTPATIENT
Start: 2022-01-01 | End: 2022-01-01

## 2022-01-01 RX ORDER — ENOXAPARIN SODIUM 100 MG/ML
30 INJECTION SUBCUTANEOUS EVERY 24 HOURS
Status: DISCONTINUED | OUTPATIENT
Start: 2022-01-01 | End: 2022-01-01

## 2022-01-01 RX ORDER — GABAPENTIN 100 MG/1
100 CAPSULE ORAL DAILY
Status: DISCONTINUED | OUTPATIENT
Start: 2022-01-01 | End: 2022-01-01

## 2022-01-01 RX ORDER — ONDANSETRON 4 MG/1
4 TABLET, ORALLY DISINTEGRATING ORAL EVERY 6 HOURS PRN
Status: DISCONTINUED | OUTPATIENT
Start: 2022-01-01 | End: 2022-01-01 | Stop reason: HOSPADM

## 2022-01-01 RX ORDER — GABAPENTIN 300 MG/1
300 CAPSULE ORAL 3 TIMES DAILY
Status: DISCONTINUED | OUTPATIENT
Start: 2022-01-01 | End: 2022-01-01

## 2022-01-01 RX ORDER — SERTRALINE HYDROCHLORIDE 25 MG/1
25 TABLET, FILM COATED ORAL DAILY
COMMUNITY

## 2022-01-01 RX ORDER — MIDAZOLAM HYDROCHLORIDE 1 MG/ML
INJECTION, SOLUTION INTRAMUSCULAR; INTRAVENOUS
Status: DISCONTINUED | OUTPATIENT
Start: 2022-01-01 | End: 2022-01-01 | Stop reason: HOSPADM

## 2022-01-01 RX ORDER — OXYCODONE HYDROCHLORIDE 5 MG/1
15 TABLET ORAL EVERY 4 HOURS PRN
Status: DISCONTINUED | OUTPATIENT
Start: 2022-01-01 | End: 2022-01-01 | Stop reason: HOSPADM

## 2022-01-01 RX ORDER — LANOLIN ALCOHOL/MO/W.PET/CERES
400 CREAM (GRAM) TOPICAL DAILY
Status: DISCONTINUED | OUTPATIENT
Start: 2022-01-01 | End: 2022-01-01

## 2022-01-01 RX ORDER — DEXAMETHASONE 1 MG/1
2 TABLET ORAL DAILY
Status: DISCONTINUED | OUTPATIENT
Start: 2022-01-01 | End: 2022-01-01 | Stop reason: HOSPADM

## 2022-01-01 RX ORDER — HYDROMORPHONE HYDROCHLORIDE 2 MG/ML
1 INJECTION, SOLUTION INTRAMUSCULAR; INTRAVENOUS; SUBCUTANEOUS EVERY 4 HOURS PRN
Status: DISCONTINUED | OUTPATIENT
Start: 2022-01-01 | End: 2022-01-01 | Stop reason: HOSPADM

## 2022-01-01 RX ORDER — TACROLIMUS 1 MG/1
1 CAPSULE ORAL EVERY MORNING
Status: DISCONTINUED | OUTPATIENT
Start: 2022-01-01 | End: 2022-01-01

## 2022-01-01 RX ORDER — ENOXAPARIN SODIUM 100 MG/ML
40 INJECTION SUBCUTANEOUS EVERY 24 HOURS
Status: DISCONTINUED | OUTPATIENT
Start: 2022-01-01 | End: 2022-01-01

## 2022-01-01 RX ORDER — IODIXANOL 320 MG/ML
INJECTION, SOLUTION INTRAVASCULAR
Status: DISCONTINUED | OUTPATIENT
Start: 2022-01-01 | End: 2022-01-01 | Stop reason: HOSPADM

## 2022-01-01 RX ORDER — ACETAMINOPHEN 10 MG/ML
1000 INJECTION, SOLUTION INTRAVENOUS ONCE
Status: COMPLETED | OUTPATIENT
Start: 2022-01-01 | End: 2022-01-01

## 2022-01-01 RX ORDER — ONDANSETRON 2 MG/ML
4 INJECTION INTRAMUSCULAR; INTRAVENOUS EVERY 8 HOURS PRN
Status: DISCONTINUED | OUTPATIENT
Start: 2022-01-01 | End: 2022-01-01 | Stop reason: HOSPADM

## 2022-01-01 RX ORDER — BUPIVACAINE HYDROCHLORIDE 5 MG/ML
INJECTION, SOLUTION EPIDURAL; INTRACAUDAL
Status: COMPLETED | OUTPATIENT
Start: 2022-01-01 | End: 2022-01-01

## 2022-01-01 RX ORDER — HYDROMORPHONE HYDROCHLORIDE 2 MG/ML
1 INJECTION, SOLUTION INTRAMUSCULAR; INTRAVENOUS; SUBCUTANEOUS ONCE
Status: COMPLETED | OUTPATIENT
Start: 2022-01-01 | End: 2022-01-01

## 2022-01-01 RX ORDER — ASPIRIN 81 MG/1
81 TABLET ORAL DAILY
Qty: 90 TABLET | Refills: 1
Start: 2022-01-01 | End: 2023-09-02

## 2022-01-01 RX ORDER — GABAPENTIN 100 MG/1
100 CAPSULE ORAL 3 TIMES DAILY
Status: DISCONTINUED | OUTPATIENT
Start: 2022-01-01 | End: 2022-01-01

## 2022-01-01 RX ORDER — ALBUTEROL SULFATE 90 UG/1
2 AEROSOL, METERED RESPIRATORY (INHALATION) EVERY 6 HOURS
Status: DISCONTINUED | OUTPATIENT
Start: 2022-01-01 | End: 2022-01-01 | Stop reason: HOSPADM

## 2022-01-01 RX ORDER — ONDANSETRON 4 MG/1
4 TABLET, FILM COATED ORAL EVERY 8 HOURS PRN
COMMUNITY

## 2022-01-01 RX ORDER — FENTANYL CITRATE 50 UG/ML
INJECTION, SOLUTION INTRAMUSCULAR; INTRAVENOUS
Status: DISCONTINUED | OUTPATIENT
Start: 2022-01-01 | End: 2022-01-01 | Stop reason: HOSPADM

## 2022-01-01 RX ORDER — FUROSEMIDE 40 MG/1
40 TABLET ORAL DAILY
Status: DISCONTINUED | OUTPATIENT
Start: 2022-01-01 | End: 2022-01-01

## 2022-01-01 RX ORDER — SODIUM CHLORIDE, SODIUM LACTATE, POTASSIUM CHLORIDE, CALCIUM CHLORIDE 600; 310; 30; 20 MG/100ML; MG/100ML; MG/100ML; MG/100ML
INJECTION, SOLUTION INTRAVENOUS CONTINUOUS PRN
Status: DISCONTINUED | OUTPATIENT
Start: 2022-01-01 | End: 2022-01-01

## 2022-01-01 RX ORDER — ACETAMINOPHEN 325 MG/1
650 TABLET ORAL EVERY 4 HOURS PRN
Status: DISCONTINUED | OUTPATIENT
Start: 2022-01-01 | End: 2022-01-01

## 2022-01-01 RX ORDER — CHLORHEXIDINE GLUCONATE ORAL RINSE 1.2 MG/ML
10 SOLUTION DENTAL 2 TIMES DAILY
Status: DISPENSED | OUTPATIENT
Start: 2022-01-01 | End: 2022-01-01

## 2022-01-01 RX ORDER — GABAPENTIN 100 MG/1
100 CAPSULE ORAL 2 TIMES DAILY
Status: DISCONTINUED | OUTPATIENT
Start: 2022-01-01 | End: 2022-01-01

## 2022-01-01 RX ORDER — ACETAMINOPHEN 325 MG/1
650 TABLET ORAL EVERY 6 HOURS PRN
Status: DISCONTINUED | OUTPATIENT
Start: 2022-01-01 | End: 2022-01-01

## 2022-01-01 RX ORDER — BENZONATATE 100 MG/1
100 CAPSULE ORAL 3 TIMES DAILY PRN
Status: DISCONTINUED | OUTPATIENT
Start: 2022-01-01 | End: 2022-01-01 | Stop reason: HOSPADM

## 2022-01-01 RX ORDER — MORPHINE SULFATE 2 MG/ML
3 INJECTION, SOLUTION INTRAMUSCULAR; INTRAVENOUS
Status: DISCONTINUED | OUTPATIENT
Start: 2022-01-01 | End: 2022-01-01

## 2022-01-01 RX ORDER — LANOLIN ALCOHOL/MO/W.PET/CERES
400 CREAM (GRAM) TOPICAL ONCE
Status: COMPLETED | OUTPATIENT
Start: 2022-01-01 | End: 2022-01-01

## 2022-01-01 RX ORDER — MAG HYDROX/ALUMINUM HYD/SIMETH 200-200-20
30 SUSPENSION, ORAL (FINAL DOSE FORM) ORAL EVERY 6 HOURS PRN
Status: DISCONTINUED | OUTPATIENT
Start: 2022-01-01 | End: 2022-01-01 | Stop reason: HOSPADM

## 2022-01-01 RX ADMIN — APIXABAN 2.5 MG: 2.5 TABLET, FILM COATED ORAL at 09:08

## 2022-01-01 RX ADMIN — HEPARIN SODIUM 5000 UNITS: 5000 INJECTION INTRAVENOUS; SUBCUTANEOUS at 06:09

## 2022-01-01 RX ADMIN — HYDROCODONE BITARTRATE AND ACETAMINOPHEN 1 TABLET: 5; 325 TABLET ORAL at 09:09

## 2022-01-01 RX ADMIN — HEPARIN SODIUM 5000 UNITS: 5000 INJECTION INTRAVENOUS; SUBCUTANEOUS at 08:10

## 2022-01-01 RX ADMIN — CARVEDILOL 12.5 MG: 12.5 TABLET, FILM COATED ORAL at 09:10

## 2022-01-01 RX ADMIN — MAGNESIUM OXIDE TAB 400 MG (241.3 MG ELEMENTAL MG) 400 MG: 400 (241.3 MG) TAB at 08:09

## 2022-01-01 RX ADMIN — GABAPENTIN 100 MG: 100 CAPSULE ORAL at 08:09

## 2022-01-01 RX ADMIN — FLUDROCORTISONE ACETATE 100 MCG: 0.1 TABLET ORAL at 08:10

## 2022-01-01 RX ADMIN — TACROLIMUS 1 MG: 1 CAPSULE ORAL at 06:10

## 2022-01-01 RX ADMIN — MIDODRINE HYDROCHLORIDE 10 MG: 5 TABLET ORAL at 08:10

## 2022-01-01 RX ADMIN — DICYCLOMINE HYDROCHLORIDE 10 MG: 10 CAPSULE ORAL at 06:10

## 2022-01-01 RX ADMIN — METRONIDAZOLE 500 MG: 500 TABLET ORAL at 01:09

## 2022-01-01 RX ADMIN — HYDROCORTISONE SODIUM SUCCINATE 40 MG: 100 INJECTION, POWDER, FOR SOLUTION INTRAMUSCULAR; INTRAVENOUS at 05:10

## 2022-01-01 RX ADMIN — HEPARIN SODIUM 5000 UNITS: 5000 INJECTION INTRAVENOUS; SUBCUTANEOUS at 05:09

## 2022-01-01 RX ADMIN — MIDODRINE HYDROCHLORIDE 10 MG: 5 TABLET ORAL at 04:10

## 2022-01-01 RX ADMIN — TACROLIMUS 0.5 MG: 0.5 CAPSULE ORAL at 05:10

## 2022-01-01 RX ADMIN — SODIUM CHLORIDE: 0.45 INJECTION, SOLUTION INTRAVENOUS at 04:09

## 2022-01-01 RX ADMIN — TACROLIMUS 1 MG: 1 CAPSULE ORAL at 07:10

## 2022-01-01 RX ADMIN — TACROLIMUS 1 MG: 1 CAPSULE ORAL at 09:09

## 2022-01-01 RX ADMIN — INSULIN ASPART 2 UNITS: 100 INJECTION, SOLUTION INTRAVENOUS; SUBCUTANEOUS at 05:08

## 2022-01-01 RX ADMIN — METRONIDAZOLE 500 MG: 500 TABLET ORAL at 05:09

## 2022-01-01 RX ADMIN — Medication 4 TABLET: at 08:10

## 2022-01-01 RX ADMIN — HEPARIN SODIUM 5000 UNITS: 5000 INJECTION INTRAVENOUS; SUBCUTANEOUS at 09:10

## 2022-01-01 RX ADMIN — MORPHINE SULFATE 2 MG: 4 INJECTION INTRAVENOUS at 06:08

## 2022-01-01 RX ADMIN — DICYCLOMINE HYDROCHLORIDE 10 MG: 10 CAPSULE ORAL at 10:09

## 2022-01-01 RX ADMIN — SERTRALINE HYDROCHLORIDE 25 MG: 25 TABLET ORAL at 08:08

## 2022-01-01 RX ADMIN — INSULIN ASPART 3 UNITS: 100 INJECTION, SOLUTION INTRAVENOUS; SUBCUTANEOUS at 05:08

## 2022-01-01 RX ADMIN — GABAPENTIN 100 MG: 100 CAPSULE ORAL at 04:09

## 2022-01-01 RX ADMIN — METRONIDAZOLE 500 MG: 500 TABLET ORAL at 10:10

## 2022-01-01 RX ADMIN — HYDROCODONE BITARTRATE AND ACETAMINOPHEN 1 TABLET: 10; 325 TABLET ORAL at 05:09

## 2022-01-01 RX ADMIN — CHOLESTYRAMINE POWDER FOR SUSPENSION 4 G: 4 POWDER, FOR SUSPENSION ORAL at 09:10

## 2022-01-01 RX ADMIN — METRONIDAZOLE 500 MG: 500 TABLET ORAL at 02:10

## 2022-01-01 RX ADMIN — SODIUM CHLORIDE: 0.9 INJECTION, SOLUTION INTRAVENOUS at 05:09

## 2022-01-01 RX ADMIN — DICYCLOMINE HYDROCHLORIDE 10 MG: 10 CAPSULE ORAL at 11:10

## 2022-01-01 RX ADMIN — MYCOPHENOLATE MOFETIL 250 MG: 250 CAPSULE ORAL at 12:09

## 2022-01-01 RX ADMIN — INSULIN ASPART 2 UNITS: 100 INJECTION, SOLUTION INTRAVENOUS; SUBCUTANEOUS at 11:10

## 2022-01-01 RX ADMIN — FLUTICASONE PROPIONATE 100 MCG: 50 SPRAY, METERED NASAL at 08:08

## 2022-01-01 RX ADMIN — EPOETIN ALFA-EPBX 4880 UNITS: 4000 INJECTION, SOLUTION INTRAVENOUS; SUBCUTANEOUS at 10:10

## 2022-01-01 RX ADMIN — EPHEDRINE SULFATE 25 MG: 50 INJECTION INTRAVENOUS at 09:09

## 2022-01-01 RX ADMIN — HYDROCORTISONE SODIUM SUCCINATE 80 MG: 100 INJECTION, POWDER, FOR SOLUTION INTRAMUSCULAR; INTRAVENOUS at 05:10

## 2022-01-01 RX ADMIN — ALBUTEROL SULFATE 2 PUFF: 90 AEROSOL, METERED RESPIRATORY (INHALATION) at 07:09

## 2022-01-01 RX ADMIN — TACROLIMUS 1 MG: 1 CAPSULE ORAL at 05:09

## 2022-01-01 RX ADMIN — MELATONIN TAB 3 MG 6 MG: 3 TAB at 09:08

## 2022-01-01 RX ADMIN — POTASSIUM BICARBONATE 20 MEQ: 391 TABLET, EFFERVESCENT ORAL at 06:09

## 2022-01-01 RX ADMIN — CEFEPIME 2 G: 2 INJECTION, POWDER, FOR SOLUTION INTRAVENOUS at 04:09

## 2022-01-01 RX ADMIN — Medication 0.04 MCG/KG/MIN: at 12:10

## 2022-01-01 RX ADMIN — DICYCLOMINE HYDROCHLORIDE 10 MG: 10 CAPSULE ORAL at 09:10

## 2022-01-01 RX ADMIN — OXYCODONE AND ACETAMINOPHEN 1 TABLET: 10; 325 TABLET ORAL at 11:08

## 2022-01-01 RX ADMIN — METRONIDAZOLE 500 MG: 500 TABLET ORAL at 06:10

## 2022-01-01 RX ADMIN — HYDRALAZINE HYDROCHLORIDE 10 MG: 20 INJECTION, SOLUTION INTRAMUSCULAR; INTRAVENOUS at 07:08

## 2022-01-01 RX ADMIN — FUROSEMIDE 40 MG: 10 INJECTION, SOLUTION INTRAMUSCULAR; INTRAVENOUS at 04:09

## 2022-01-01 RX ADMIN — DICYCLOMINE HYDROCHLORIDE 10 MG: 10 CAPSULE ORAL at 11:09

## 2022-01-01 RX ADMIN — MAGNESIUM SULFATE HEPTAHYDRATE 2 G: 40 INJECTION, SOLUTION INTRAVENOUS at 11:08

## 2022-01-01 RX ADMIN — HYDROCODONE BITARTRATE AND ACETAMINOPHEN 1 TABLET: 10; 325 TABLET ORAL at 03:09

## 2022-01-01 RX ADMIN — CARVEDILOL 12.5 MG: 12.5 TABLET, FILM COATED ORAL at 09:09

## 2022-01-01 RX ADMIN — SODIUM BICARBONATE 650 MG TABLET 1300 MG: at 08:10

## 2022-01-01 RX ADMIN — Medication 4 TABLET: at 05:10

## 2022-01-01 RX ADMIN — SERTRALINE HYDROCHLORIDE 25 MG: 25 TABLET ORAL at 08:10

## 2022-01-01 RX ADMIN — MORPHINE SULFATE 3 MG: 4 INJECTION INTRAVENOUS at 07:10

## 2022-01-01 RX ADMIN — ONDANSETRON 4 MG: 2 INJECTION INTRAMUSCULAR; INTRAVENOUS at 09:09

## 2022-01-01 RX ADMIN — ONDANSETRON 4 MG: 2 INJECTION INTRAMUSCULAR; INTRAVENOUS at 06:10

## 2022-01-01 RX ADMIN — HYDROMORPHONE HYDROCHLORIDE 2 MG: 2 TABLET ORAL at 11:10

## 2022-01-01 RX ADMIN — ONDANSETRON 4 MG: 2 INJECTION INTRAMUSCULAR; INTRAVENOUS at 02:09

## 2022-01-01 RX ADMIN — Medication 4 TABLET: at 05:09

## 2022-01-01 RX ADMIN — HEPARIN SODIUM 5000 UNITS: 5000 INJECTION INTRAVENOUS; SUBCUTANEOUS at 10:09

## 2022-01-01 RX ADMIN — CHOLESTYRAMINE POWDER FOR SUSPENSION 4 G: 4 POWDER, FOR SUSPENSION ORAL at 08:10

## 2022-01-01 RX ADMIN — Medication 400 MG: at 09:10

## 2022-01-01 RX ADMIN — MAGNESIUM OXIDE TAB 400 MG (241.3 MG ELEMENTAL MG) 400 MG: 400 (241.3 MG) TAB at 09:10

## 2022-01-01 RX ADMIN — HYDROMORPHONE HYDROCHLORIDE 0.2 MG: 2 INJECTION INTRAMUSCULAR; INTRAVENOUS; SUBCUTANEOUS at 01:10

## 2022-01-01 RX ADMIN — CARVEDILOL 12.5 MG: 12.5 TABLET, FILM COATED ORAL at 08:08

## 2022-01-01 RX ADMIN — FUROSEMIDE 40 MG: 10 INJECTION, SOLUTION INTRAMUSCULAR; INTRAVENOUS at 10:10

## 2022-01-01 RX ADMIN — ALUMINUM HYDROXIDE, MAGNESIUM HYDROXIDE, AND SIMETHICONE 30 ML: 200; 200; 20 SUSPENSION ORAL at 08:09

## 2022-01-01 RX ADMIN — OXYCODONE HYDROCHLORIDE AND ACETAMINOPHEN 500 MG: 500 TABLET ORAL at 09:09

## 2022-01-01 RX ADMIN — Medication 4 TABLET: at 11:09

## 2022-01-01 RX ADMIN — INSULIN DETEMIR 30 UNITS: 100 INJECTION, SOLUTION SUBCUTANEOUS at 10:09

## 2022-01-01 RX ADMIN — MAGNESIUM OXIDE TAB 400 MG (241.3 MG ELEMENTAL MG) 400 MG: 400 (241.3 MG) TAB at 11:09

## 2022-01-01 RX ADMIN — CHLORHEXIDINE GLUCONATE 0.12% ORAL RINSE 10 ML: 1.2 LIQUID ORAL at 08:08

## 2022-01-01 RX ADMIN — SERTRALINE HYDROCHLORIDE 25 MG: 25 TABLET ORAL at 08:09

## 2022-01-01 RX ADMIN — CHLORHEXIDINE GLUCONATE 0.12% ORAL RINSE 10 ML: 1.2 LIQUID ORAL at 09:10

## 2022-01-01 RX ADMIN — MORPHINE SULFATE 2 MG: 4 INJECTION INTRAVENOUS at 11:08

## 2022-01-01 RX ADMIN — Medication 4 TABLET: at 07:10

## 2022-01-01 RX ADMIN — INSULIN HUMAN 10 UNITS: 100 INJECTION, SOLUTION PARENTERAL at 05:09

## 2022-01-01 RX ADMIN — CHLORHEXIDINE GLUCONATE 0.12% ORAL RINSE 10 ML: 1.2 LIQUID ORAL at 09:08

## 2022-01-01 RX ADMIN — MORPHINE SULFATE 2 MG: 4 INJECTION INTRAVENOUS at 06:09

## 2022-01-01 RX ADMIN — GABAPENTIN 100 MG: 100 CAPSULE ORAL at 03:10

## 2022-01-01 RX ADMIN — CARVEDILOL 12.5 MG: 12.5 TABLET, FILM COATED ORAL at 08:09

## 2022-01-01 RX ADMIN — DICYCLOMINE HYDROCHLORIDE 10 MG: 10 CAPSULE ORAL at 05:10

## 2022-01-01 RX ADMIN — DICYCLOMINE HYDROCHLORIDE 10 MG: 10 CAPSULE ORAL at 04:09

## 2022-01-01 RX ADMIN — MORPHINE SULFATE 2 MG: 4 INJECTION INTRAVENOUS at 09:09

## 2022-01-01 RX ADMIN — HEPARIN SODIUM 5000 UNITS: 5000 INJECTION INTRAVENOUS; SUBCUTANEOUS at 02:09

## 2022-01-01 RX ADMIN — GABAPENTIN 100 MG: 100 CAPSULE ORAL at 09:10

## 2022-01-01 RX ADMIN — AMLODIPINE BESYLATE 10 MG: 10 TABLET ORAL at 08:08

## 2022-01-01 RX ADMIN — CLOPIDOGREL 75 MG: 75 TABLET, FILM COATED ORAL at 11:08

## 2022-01-01 RX ADMIN — LINACLOTIDE 145 MCG: 145 CAPSULE, GELATIN COATED ORAL at 06:10

## 2022-01-01 RX ADMIN — INSULIN ASPART 4 UNITS: 100 INJECTION, SOLUTION INTRAVENOUS; SUBCUTANEOUS at 11:08

## 2022-01-01 RX ADMIN — TACROLIMUS 1 MG: 1 CAPSULE ORAL at 05:10

## 2022-01-01 RX ADMIN — POTASSIUM CHLORIDE 40 MEQ: 1500 TABLET, EXTENDED RELEASE ORAL at 10:09

## 2022-01-01 RX ADMIN — FUROSEMIDE 40 MG: 10 INJECTION, SOLUTION INTRAMUSCULAR; INTRAVENOUS at 03:09

## 2022-01-01 RX ADMIN — MORPHINE SULFATE 2 MG: 4 INJECTION INTRAVENOUS at 10:08

## 2022-01-01 RX ADMIN — SERTRALINE HYDROCHLORIDE 25 MG: 25 TABLET ORAL at 09:10

## 2022-01-01 RX ADMIN — MYCOPHENOLATE MOFETIL 250 MG: 250 CAPSULE ORAL at 08:09

## 2022-01-01 RX ADMIN — INSULIN ASPART 4 UNITS: 100 INJECTION, SOLUTION INTRAVENOUS; SUBCUTANEOUS at 05:08

## 2022-01-01 RX ADMIN — HYDROCODONE BITARTRATE AND ACETAMINOPHEN 1 TABLET: 10; 325 TABLET ORAL at 11:09

## 2022-01-01 RX ADMIN — MELATONIN TAB 3 MG 6 MG: 3 TAB at 08:10

## 2022-01-01 RX ADMIN — HYDROCODONE BITARTRATE AND ACETAMINOPHEN 1 TABLET: 10; 325 TABLET ORAL at 08:09

## 2022-01-01 RX ADMIN — TACROLIMUS 1 MG: 1 CAPSULE ORAL at 05:08

## 2022-01-01 RX ADMIN — SODIUM BICARBONATE 650 MG TABLET 1300 MG: at 03:10

## 2022-01-01 RX ADMIN — OXYCODONE HYDROCHLORIDE AND ACETAMINOPHEN 500 MG: 500 TABLET ORAL at 09:10

## 2022-01-01 RX ADMIN — GABAPENTIN 300 MG: 300 CAPSULE ORAL at 11:09

## 2022-01-01 RX ADMIN — HYDROCORTISONE SODIUM SUCCINATE 80 MG: 100 INJECTION, POWDER, FOR SOLUTION INTRAMUSCULAR; INTRAVENOUS at 12:10

## 2022-01-01 RX ADMIN — HEPARIN SODIUM 5000 UNITS: 5000 INJECTION INTRAVENOUS; SUBCUTANEOUS at 09:09

## 2022-01-01 RX ADMIN — INSULIN ASPART 2 UNITS: 100 INJECTION, SOLUTION INTRAVENOUS; SUBCUTANEOUS at 11:08

## 2022-01-01 RX ADMIN — TACROLIMUS 1 MG: 1 CAPSULE ORAL at 11:09

## 2022-01-01 RX ADMIN — INSULIN DETEMIR 30 UNITS: 100 INJECTION, SOLUTION SUBCUTANEOUS at 08:09

## 2022-01-01 RX ADMIN — ATORVASTATIN CALCIUM 80 MG: 40 TABLET, FILM COATED ORAL at 08:08

## 2022-01-01 RX ADMIN — LINACLOTIDE 145 MCG: 145 CAPSULE, GELATIN COATED ORAL at 06:09

## 2022-01-01 RX ADMIN — INSULIN ASPART 1 UNITS: 100 INJECTION, SOLUTION INTRAVENOUS; SUBCUTANEOUS at 09:08

## 2022-01-01 RX ADMIN — MAGNESIUM OXIDE TAB 400 MG (241.3 MG ELEMENTAL MG) 400 MG: 400 (241.3 MG) TAB at 09:09

## 2022-01-01 RX ADMIN — SODIUM BICARBONATE 650 MG TABLET 1300 MG: at 04:10

## 2022-01-01 RX ADMIN — CEFTRIAXONE 2 G: 2 INJECTION, SOLUTION INTRAVENOUS at 04:10

## 2022-01-01 RX ADMIN — HYDROCODONE BITARTRATE AND ACETAMINOPHEN 1 TABLET: 10; 325 TABLET ORAL at 06:09

## 2022-01-01 RX ADMIN — HYDROCODONE BITARTRATE AND ACETAMINOPHEN 1 TABLET: 5; 325 TABLET ORAL at 12:10

## 2022-01-01 RX ADMIN — METOPROLOL TARTRATE 25 MG: 25 TABLET, FILM COATED ORAL at 09:08

## 2022-01-01 RX ADMIN — EPHEDRINE SULFATE 10 MG: 50 INJECTION INTRAVENOUS at 08:09

## 2022-01-01 RX ADMIN — HYDROCORTISONE SODIUM SUCCINATE 40 MG: 100 INJECTION, POWDER, FOR SOLUTION INTRAMUSCULAR; INTRAVENOUS at 10:10

## 2022-01-01 RX ADMIN — FENTANYL CITRATE 25 MCG: 50 INJECTION, SOLUTION INTRAMUSCULAR; INTRAVENOUS at 11:10

## 2022-01-01 RX ADMIN — ONDANSETRON 4 MG: 4 TABLET, ORALLY DISINTEGRATING ORAL at 03:09

## 2022-01-01 RX ADMIN — MIDAZOLAM 2 MG: 1 INJECTION INTRAMUSCULAR; INTRAVENOUS at 09:08

## 2022-01-01 RX ADMIN — CEFEPIME 2 G: 2 INJECTION, POWDER, FOR SOLUTION INTRAVENOUS at 03:09

## 2022-01-01 RX ADMIN — METRONIDAZOLE 500 MG: 500 TABLET ORAL at 08:09

## 2022-01-01 RX ADMIN — PHENYLEPHRINE HYDROCHLORIDE 200 MCG: 10 INJECTION INTRAVENOUS at 10:10

## 2022-01-01 RX ADMIN — INSULIN ASPART 2 UNITS: 100 INJECTION, SOLUTION INTRAVENOUS; SUBCUTANEOUS at 12:09

## 2022-01-01 RX ADMIN — METHOCARBAMOL 500 MG: 500 TABLET ORAL at 12:08

## 2022-01-01 RX ADMIN — LINEZOLID 600 MG: 600 INJECTION, SOLUTION INTRAVENOUS at 09:09

## 2022-01-01 RX ADMIN — HYDROCODONE BITARTRATE AND ACETAMINOPHEN 1 TABLET: 5; 325 TABLET ORAL at 09:10

## 2022-01-01 RX ADMIN — POTASSIUM CHLORIDE 40 MEQ: 1500 TABLET, EXTENDED RELEASE ORAL at 11:08

## 2022-01-01 RX ADMIN — CHOLESTYRAMINE POWDER FOR SUSPENSION 4 G: 4 POWDER, FOR SUSPENSION ORAL at 08:09

## 2022-01-01 RX ADMIN — METRONIDAZOLE 500 MG: 500 TABLET ORAL at 02:09

## 2022-01-01 RX ADMIN — INSULIN ASPART 8 UNITS: 100 INJECTION, SOLUTION INTRAVENOUS; SUBCUTANEOUS at 06:10

## 2022-01-01 RX ADMIN — Medication 4 TABLET: at 01:10

## 2022-01-01 RX ADMIN — MIDODRINE HYDROCHLORIDE 5 MG: 5 TABLET ORAL at 06:10

## 2022-01-01 RX ADMIN — DICYCLOMINE HYDROCHLORIDE 10 MG: 10 CAPSULE ORAL at 08:10

## 2022-01-01 RX ADMIN — OXYCODONE HYDROCHLORIDE AND ACETAMINOPHEN 500 MG: 500 TABLET ORAL at 08:08

## 2022-01-01 RX ADMIN — SERTRALINE HYDROCHLORIDE 25 MG: 25 TABLET ORAL at 09:08

## 2022-01-01 RX ADMIN — HYDRALAZINE HYDROCHLORIDE 10 MG: 20 INJECTION, SOLUTION INTRAMUSCULAR; INTRAVENOUS at 12:08

## 2022-01-01 RX ADMIN — METRONIDAZOLE 500 MG: 500 TABLET ORAL at 09:09

## 2022-01-01 RX ADMIN — HYDROCODONE BITARTRATE AND ACETAMINOPHEN 1 TABLET: 5; 325 TABLET ORAL at 11:08

## 2022-01-01 RX ADMIN — CHOLESTYRAMINE POWDER FOR SUSPENSION 4 G: 4 POWDER, FOR SUSPENSION ORAL at 09:09

## 2022-01-01 RX ADMIN — CEFEPIME HYDROCHLORIDE 1 G: 1 INJECTION, SOLUTION INTRAVENOUS at 02:09

## 2022-01-01 RX ADMIN — CARVEDILOL 6.25 MG: 6.25 TABLET, FILM COATED ORAL at 08:08

## 2022-01-01 RX ADMIN — HYDROMORPHONE HYDROCHLORIDE 1 MG: 2 INJECTION INTRAMUSCULAR; INTRAVENOUS; SUBCUTANEOUS at 12:08

## 2022-01-01 RX ADMIN — FAMOTIDINE 20 MG: 20 TABLET ORAL at 09:10

## 2022-01-01 RX ADMIN — REMDESIVIR 100 MG: 100 INJECTION, POWDER, LYOPHILIZED, FOR SOLUTION INTRAVENOUS at 09:09

## 2022-01-01 RX ADMIN — MUPIROCIN: 20 OINTMENT TOPICAL at 08:08

## 2022-01-01 RX ADMIN — INSULIN DETEMIR 12 UNITS: 100 INJECTION, SOLUTION SUBCUTANEOUS at 09:10

## 2022-01-01 RX ADMIN — HYDRALAZINE HYDROCHLORIDE 10 MG: 20 INJECTION, SOLUTION INTRAMUSCULAR; INTRAVENOUS at 05:08

## 2022-01-01 RX ADMIN — MORPHINE SULFATE 2 MG: 4 INJECTION INTRAVENOUS at 08:08

## 2022-01-01 RX ADMIN — GABAPENTIN 100 MG: 100 CAPSULE ORAL at 09:09

## 2022-01-01 RX ADMIN — HEPARIN SODIUM 5000 UNITS: 5000 INJECTION INTRAVENOUS; SUBCUTANEOUS at 04:09

## 2022-01-01 RX ADMIN — CHOLESTYRAMINE POWDER FOR SUSPENSION 4 G: 4 POWDER, FOR SUSPENSION ORAL at 11:09

## 2022-01-01 RX ADMIN — FAMOTIDINE 20 MG: 20 TABLET ORAL at 08:10

## 2022-01-01 RX ADMIN — ONDANSETRON 4 MG: 2 INJECTION INTRAMUSCULAR; INTRAVENOUS at 03:10

## 2022-01-01 RX ADMIN — HEPARIN SODIUM 5000 UNITS: 5000 INJECTION INTRAVENOUS; SUBCUTANEOUS at 06:10

## 2022-01-01 RX ADMIN — CEFEPIME 2 G: 2 INJECTION, POWDER, FOR SOLUTION INTRAVENOUS at 05:09

## 2022-01-01 RX ADMIN — FLUTICASONE PROPIONATE 100 MCG: 50 SPRAY, METERED NASAL at 09:08

## 2022-01-01 RX ADMIN — METRONIDAZOLE 500 MG: 500 TABLET ORAL at 09:10

## 2022-01-01 RX ADMIN — DICYCLOMINE HYDROCHLORIDE 10 MG: 10 CAPSULE ORAL at 06:09

## 2022-01-01 RX ADMIN — CLOPIDOGREL 75 MG: 75 TABLET, FILM COATED ORAL at 09:09

## 2022-01-01 RX ADMIN — ACETAMINOPHEN 1000 MG: 10 INJECTION INTRAVENOUS at 11:10

## 2022-01-01 RX ADMIN — SODIUM BICARBONATE 650 MG TABLET 650 MG: at 11:09

## 2022-01-01 RX ADMIN — SODIUM CHLORIDE, SODIUM LACTATE, POTASSIUM CHLORIDE, AND CALCIUM CHLORIDE: .6; .31; .03; .02 INJECTION, SOLUTION INTRAVENOUS at 09:10

## 2022-01-01 RX ADMIN — HYDROCODONE BITARTRATE AND ACETAMINOPHEN 1 TABLET: 10; 325 TABLET ORAL at 05:10

## 2022-01-01 RX ADMIN — MAGNESIUM SULFATE 1 G: 1 INJECTION INTRAVENOUS at 10:09

## 2022-01-01 RX ADMIN — OXYCODONE AND ACETAMINOPHEN 1 TABLET: 7.5; 325 TABLET ORAL at 05:09

## 2022-01-01 RX ADMIN — ALBUTEROL SULFATE 2 PUFF: 90 AEROSOL, METERED RESPIRATORY (INHALATION) at 01:08

## 2022-01-01 RX ADMIN — INSULIN ASPART 2 UNITS: 100 INJECTION, SOLUTION INTRAVENOUS; SUBCUTANEOUS at 08:09

## 2022-01-01 RX ADMIN — METRONIDAZOLE 500 MG: 500 TABLET ORAL at 01:10

## 2022-01-01 RX ADMIN — SODIUM ZIRCONIUM CYCLOSILICATE 10 G: 5 POWDER, FOR SUSPENSION ORAL at 08:09

## 2022-01-01 RX ADMIN — Medication 0.06 MCG/KG/MIN: at 06:10

## 2022-01-01 RX ADMIN — INSULIN DETEMIR 5 UNITS: 100 INJECTION, SOLUTION SUBCUTANEOUS at 08:10

## 2022-01-01 RX ADMIN — MICONAZOLE NITRATE: 20 OINTMENT TOPICAL at 10:10

## 2022-01-01 RX ADMIN — MORPHINE SULFATE 2 MG: 4 INJECTION INTRAVENOUS at 12:09

## 2022-01-01 RX ADMIN — HYDROMORPHONE HYDROCHLORIDE 1 MG: 1 INJECTION, SOLUTION INTRAMUSCULAR; INTRAVENOUS; SUBCUTANEOUS at 08:10

## 2022-01-01 RX ADMIN — DICYCLOMINE HYDROCHLORIDE 10 MG: 10 CAPSULE ORAL at 12:10

## 2022-01-01 RX ADMIN — ONDANSETRON 8 MG: 8 TABLET, ORALLY DISINTEGRATING ORAL at 01:08

## 2022-01-01 RX ADMIN — HEPARIN SODIUM 5000 UNITS: 5000 INJECTION INTRAVENOUS; SUBCUTANEOUS at 10:10

## 2022-01-01 RX ADMIN — MORPHINE SULFATE 2 MG: 4 INJECTION INTRAVENOUS at 07:09

## 2022-01-01 RX ADMIN — MYCOPHENOLATE MOFETIL 250 MG: 250 CAPSULE ORAL at 08:08

## 2022-01-01 RX ADMIN — INSULIN ASPART 6 UNITS: 100 INJECTION, SOLUTION INTRAVENOUS; SUBCUTANEOUS at 05:10

## 2022-01-01 RX ADMIN — MIDODRINE HYDROCHLORIDE 10 MG: 5 TABLET ORAL at 10:10

## 2022-01-01 RX ADMIN — GABAPENTIN 100 MG: 100 CAPSULE ORAL at 02:09

## 2022-01-01 RX ADMIN — HYDROMORPHONE HYDROCHLORIDE 1 MG: 2 INJECTION INTRAMUSCULAR; INTRAVENOUS; SUBCUTANEOUS at 08:08

## 2022-01-01 RX ADMIN — ALBUTEROL SULFATE 2 PUFF: 90 AEROSOL, METERED RESPIRATORY (INHALATION) at 02:09

## 2022-01-01 RX ADMIN — MORPHINE SULFATE 2 MG: 4 INJECTION INTRAVENOUS at 11:09

## 2022-01-01 RX ADMIN — ONDANSETRON 4 MG: 2 INJECTION INTRAMUSCULAR; INTRAVENOUS at 02:10

## 2022-01-01 RX ADMIN — ONDANSETRON 4 MG: 2 INJECTION INTRAMUSCULAR; INTRAVENOUS at 05:09

## 2022-01-01 RX ADMIN — MELATONIN TAB 3 MG 6 MG: 3 TAB at 12:08

## 2022-01-01 RX ADMIN — HYDROCODONE BITARTRATE AND ACETAMINOPHEN 1 TABLET: 10; 325 TABLET ORAL at 09:09

## 2022-01-01 RX ADMIN — PHENYLEPHRINE HYDROCHLORIDE 100 MCG: 10 INJECTION INTRAVENOUS at 10:10

## 2022-01-01 RX ADMIN — APIXABAN 2.5 MG: 2.5 TABLET, FILM COATED ORAL at 08:08

## 2022-01-01 RX ADMIN — APIXABAN 5 MG: 2.5 TABLET, FILM COATED ORAL at 09:09

## 2022-01-01 RX ADMIN — HEPARIN SODIUM 5000 UNITS: 5000 INJECTION INTRAVENOUS; SUBCUTANEOUS at 03:09

## 2022-01-01 RX ADMIN — ALBUTEROL SULFATE 2 PUFF: 90 AEROSOL, METERED RESPIRATORY (INHALATION) at 01:09

## 2022-01-01 RX ADMIN — ALUMINUM HYDROXIDE, MAGNESIUM HYDROXIDE, AND SIMETHICONE 30 ML: 200; 200; 20 SUSPENSION ORAL at 01:09

## 2022-01-01 RX ADMIN — TACROLIMUS 1 MG: 1 CAPSULE ORAL at 08:09

## 2022-01-01 RX ADMIN — Medication 0.04 MCG/KG/MIN: at 05:10

## 2022-01-01 RX ADMIN — INSULIN ASPART 2 UNITS: 100 INJECTION, SOLUTION INTRAVENOUS; SUBCUTANEOUS at 05:10

## 2022-01-01 RX ADMIN — HYDROMORPHONE HYDROCHLORIDE 1 MG: 1 INJECTION, SOLUTION INTRAMUSCULAR; INTRAVENOUS; SUBCUTANEOUS at 07:10

## 2022-01-01 RX ADMIN — ACETAMINOPHEN 1000 MG: 500 TABLET ORAL at 02:08

## 2022-01-01 RX ADMIN — INSULIN ASPART 4 UNITS: 100 INJECTION, SOLUTION INTRAVENOUS; SUBCUTANEOUS at 05:09

## 2022-01-01 RX ADMIN — CEFTRIAXONE 2 G: 2 INJECTION, SOLUTION INTRAVENOUS at 05:09

## 2022-01-01 RX ADMIN — GABAPENTIN 300 MG: 300 CAPSULE ORAL at 06:09

## 2022-01-01 RX ADMIN — MORPHINE SULFATE 2 MG: 4 INJECTION INTRAVENOUS at 08:09

## 2022-01-01 RX ADMIN — MORPHINE SULFATE 4 MG: 4 INJECTION INTRAVENOUS at 07:09

## 2022-01-01 RX ADMIN — ONDANSETRON 4 MG: 2 INJECTION INTRAMUSCULAR; INTRAVENOUS at 10:09

## 2022-01-01 RX ADMIN — ATORVASTATIN CALCIUM 80 MG: 40 TABLET, FILM COATED ORAL at 09:09

## 2022-01-01 RX ADMIN — SODIUM BICARBONATE 650 MG TABLET 1300 MG: at 09:10

## 2022-01-01 RX ADMIN — METHOCARBAMOL 500 MG: 500 TABLET ORAL at 05:08

## 2022-01-01 RX ADMIN — ONDANSETRON 4 MG: 2 INJECTION INTRAMUSCULAR; INTRAVENOUS at 10:10

## 2022-01-01 RX ADMIN — MYCOPHENOLATE MOFETIL 250 MG: 250 CAPSULE ORAL at 09:09

## 2022-01-01 RX ADMIN — INSULIN ASPART 2 UNITS: 100 INJECTION, SOLUTION INTRAVENOUS; SUBCUTANEOUS at 01:08

## 2022-01-01 RX ADMIN — HYDROMORPHONE HYDROCHLORIDE 2 MG: 2 TABLET ORAL at 05:10

## 2022-01-01 RX ADMIN — MELATONIN TAB 3 MG 6 MG: 3 TAB at 09:10

## 2022-01-01 RX ADMIN — GABAPENTIN 100 MG: 100 CAPSULE ORAL at 08:10

## 2022-01-01 RX ADMIN — HYDROMORPHONE HYDROCHLORIDE 1 MG: 1 INJECTION, SOLUTION INTRAMUSCULAR; INTRAVENOUS; SUBCUTANEOUS at 01:10

## 2022-01-01 RX ADMIN — OXYCODONE AND ACETAMINOPHEN 1 TABLET: 7.5; 325 TABLET ORAL at 06:09

## 2022-01-01 RX ADMIN — ONDANSETRON 4 MG: 2 INJECTION INTRAMUSCULAR; INTRAVENOUS at 04:10

## 2022-01-01 RX ADMIN — INSULIN DETEMIR 16 UNITS: 100 INJECTION, SOLUTION SUBCUTANEOUS at 09:09

## 2022-01-01 RX ADMIN — LINACLOTIDE 145 MCG: 145 CAPSULE, GELATIN COATED ORAL at 05:09

## 2022-01-01 RX ADMIN — DEXTROSE MONOHYDRATE 25 G: 25 INJECTION, SOLUTION INTRAVENOUS at 03:09

## 2022-01-01 RX ADMIN — AMLODIPINE BESYLATE 10 MG: 10 TABLET ORAL at 09:08

## 2022-01-01 RX ADMIN — ATORVASTATIN CALCIUM 80 MG: 40 TABLET, FILM COATED ORAL at 09:08

## 2022-01-01 RX ADMIN — MORPHINE SULFATE 2 MG: 4 INJECTION INTRAVENOUS at 02:09

## 2022-01-01 RX ADMIN — INSULIN ASPART 6 UNITS: 100 INJECTION, SOLUTION INTRAVENOUS; SUBCUTANEOUS at 12:09

## 2022-01-01 RX ADMIN — GABAPENTIN 100 MG: 100 CAPSULE ORAL at 04:10

## 2022-01-01 RX ADMIN — INSULIN DETEMIR 12 UNITS: 100 INJECTION, SOLUTION SUBCUTANEOUS at 08:10

## 2022-01-01 RX ADMIN — SODIUM CHLORIDE: 9 INJECTION, SOLUTION INTRAVENOUS at 03:09

## 2022-01-01 RX ADMIN — MICONAZOLE NITRATE: 20 OINTMENT TOPICAL at 09:10

## 2022-01-01 RX ADMIN — SIMETHICONE 80 MG: 80 TABLET, CHEWABLE ORAL at 09:09

## 2022-01-01 RX ADMIN — SUCCINYLCHOLINE CHLORIDE 140 MG: 20 INJECTION, SOLUTION INTRAMUSCULAR; INTRAVENOUS at 08:09

## 2022-01-01 RX ADMIN — TACROLIMUS 1 MG: 1 CAPSULE ORAL at 08:10

## 2022-01-01 RX ADMIN — SERTRALINE HYDROCHLORIDE 25 MG: 25 TABLET ORAL at 09:09

## 2022-01-01 RX ADMIN — METRONIDAZOLE 500 MG: 500 TABLET ORAL at 04:10

## 2022-01-01 RX ADMIN — MAGNESIUM OXIDE TAB 400 MG (241.3 MG ELEMENTAL MG) 400 MG: 400 (241.3 MG) TAB at 08:10

## 2022-01-01 RX ADMIN — DICYCLOMINE HYDROCHLORIDE 10 MG: 10 CAPSULE ORAL at 05:09

## 2022-01-01 RX ADMIN — INSULIN ASPART 2 UNITS: 100 INJECTION, SOLUTION INTRAVENOUS; SUBCUTANEOUS at 06:08

## 2022-01-01 RX ADMIN — MIDODRINE HYDROCHLORIDE 5 MG: 5 TABLET ORAL at 11:09

## 2022-01-01 RX ADMIN — TACROLIMUS 1 MG: 1 CAPSULE ORAL at 04:10

## 2022-01-01 RX ADMIN — MIDODRINE HYDROCHLORIDE 5 MG: 5 TABLET ORAL at 03:09

## 2022-01-01 RX ADMIN — INSULIN ASPART 1 UNITS: 100 INJECTION, SOLUTION INTRAVENOUS; SUBCUTANEOUS at 01:09

## 2022-01-01 RX ADMIN — SERTRALINE HYDROCHLORIDE 25 MG: 25 TABLET ORAL at 11:09

## 2022-01-01 RX ADMIN — CEFAZOLIN 2 G: 1 INJECTION, POWDER, FOR SOLUTION INTRAMUSCULAR; INTRAVENOUS at 10:10

## 2022-01-01 RX ADMIN — HYDROCODONE BITARTRATE AND ACETAMINOPHEN 1 TABLET: 5; 325 TABLET ORAL at 07:10

## 2022-01-01 RX ADMIN — ALBUTEROL SULFATE 2 PUFF: 90 AEROSOL, METERED RESPIRATORY (INHALATION) at 07:08

## 2022-01-01 RX ADMIN — ONDANSETRON 4 MG: 2 INJECTION INTRAMUSCULAR; INTRAVENOUS at 06:08

## 2022-01-01 RX ADMIN — INSULIN ASPART 6 UNITS: 100 INJECTION, SOLUTION INTRAVENOUS; SUBCUTANEOUS at 11:09

## 2022-01-01 RX ADMIN — CARVEDILOL 12.5 MG: 12.5 TABLET, FILM COATED ORAL at 11:09

## 2022-01-01 RX ADMIN — INSULIN ASPART 4 UNITS: 100 INJECTION, SOLUTION INTRAVENOUS; SUBCUTANEOUS at 04:08

## 2022-01-01 RX ADMIN — HYDROMORPHONE HYDROCHLORIDE 1 MG: 1 INJECTION, SOLUTION INTRAMUSCULAR; INTRAVENOUS; SUBCUTANEOUS at 12:10

## 2022-01-01 RX ADMIN — SODIUM CHLORIDE: 9 INJECTION, SOLUTION INTRAVENOUS at 02:09

## 2022-01-01 RX ADMIN — POTASSIUM CHLORIDE 40 MEQ: 1500 TABLET, EXTENDED RELEASE ORAL at 08:09

## 2022-01-01 RX ADMIN — Medication 4 TABLET: at 11:10

## 2022-01-01 RX ADMIN — TACROLIMUS 1 MG: 1 CAPSULE ORAL at 10:08

## 2022-01-01 RX ADMIN — INSULIN ASPART 5 UNITS: 100 INJECTION, SOLUTION INTRAVENOUS; SUBCUTANEOUS at 06:09

## 2022-01-01 RX ADMIN — SODIUM BICARBONATE 650 MG TABLET 650 MG: at 03:09

## 2022-01-01 RX ADMIN — MICONAZOLE NITRATE: 20 OINTMENT TOPICAL at 08:10

## 2022-01-01 RX ADMIN — GABAPENTIN 100 MG: 100 CAPSULE ORAL at 12:09

## 2022-01-01 RX ADMIN — DAKIN'S SOLUTION 0.125% (QUARTER STRENGTH): 0.12 SOLUTION at 09:08

## 2022-01-01 RX ADMIN — FAMOTIDINE 20 MG: 20 TABLET ORAL at 10:10

## 2022-01-01 RX ADMIN — INSULIN DETEMIR 12 UNITS: 100 INJECTION, SOLUTION SUBCUTANEOUS at 10:10

## 2022-01-01 RX ADMIN — EPOETIN ALFA-EPBX 4880 UNITS: 4000 INJECTION, SOLUTION INTRAVENOUS; SUBCUTANEOUS at 08:09

## 2022-01-01 RX ADMIN — FLUTICASONE PROPIONATE 100 MCG: 50 SPRAY, METERED NASAL at 03:08

## 2022-01-01 RX ADMIN — CEFEPIME 2 G: 2 INJECTION, POWDER, FOR SOLUTION INTRAVENOUS at 07:09

## 2022-01-01 RX ADMIN — Medication 0.08 MCG/KG/MIN: at 08:10

## 2022-01-01 RX ADMIN — ONDANSETRON 4 MG: 2 INJECTION INTRAMUSCULAR; INTRAVENOUS at 03:09

## 2022-01-01 RX ADMIN — ONDANSETRON 4 MG: 2 INJECTION INTRAMUSCULAR; INTRAVENOUS at 09:10

## 2022-01-01 RX ADMIN — AMLODIPINE BESYLATE 10 MG: 10 TABLET ORAL at 09:09

## 2022-01-01 RX ADMIN — CHOLESTYRAMINE POWDER FOR SUSPENSION 4 G: 4 POWDER, FOR SUSPENSION ORAL at 10:10

## 2022-01-01 RX ADMIN — ALBUTEROL SULFATE 2 PUFF: 90 AEROSOL, METERED RESPIRATORY (INHALATION) at 12:09

## 2022-01-01 RX ADMIN — CHLORHEXIDINE GLUCONATE 0.12% ORAL RINSE 10 ML: 1.2 LIQUID ORAL at 08:10

## 2022-01-01 RX ADMIN — MELATONIN TAB 3 MG 6 MG: 3 TAB at 11:08

## 2022-01-01 RX ADMIN — SERTRALINE HYDROCHLORIDE 25 MG: 25 TABLET ORAL at 10:10

## 2022-01-01 RX ADMIN — LINEZOLID 600 MG: 600 INJECTION, SOLUTION INTRAVENOUS at 08:09

## 2022-01-01 RX ADMIN — HYDROCODONE BITARTRATE AND ACETAMINOPHEN 1 TABLET: 5; 325 TABLET ORAL at 05:10

## 2022-01-01 RX ADMIN — MAGNESIUM OXIDE TAB 400 MG (241.3 MG ELEMENTAL MG) 400 MG: 400 (241.3 MG) TAB at 10:09

## 2022-01-01 RX ADMIN — HYDROCODONE BITARTRATE AND ACETAMINOPHEN 1 TABLET: 5; 325 TABLET ORAL at 11:10

## 2022-01-01 RX ADMIN — Medication 4 TABLET: at 10:10

## 2022-01-01 RX ADMIN — LIDOCAINE HYDROCHLORIDE 50 MG: 20 INJECTION, SOLUTION INTRAVENOUS at 09:10

## 2022-01-01 RX ADMIN — ONDANSETRON 4 MG: 2 INJECTION INTRAMUSCULAR; INTRAVENOUS at 01:10

## 2022-01-01 RX ADMIN — TACROLIMUS 0.5 MG: 0.5 CAPSULE ORAL at 06:09

## 2022-01-01 RX ADMIN — MORPHINE SULFATE 2 MG: 4 INJECTION INTRAVENOUS at 12:08

## 2022-01-01 RX ADMIN — HYDROCORTISONE SODIUM SUCCINATE 40 MG: 100 INJECTION, POWDER, FOR SOLUTION INTRAMUSCULAR; INTRAVENOUS at 09:10

## 2022-01-01 RX ADMIN — REMDESIVIR 100 MG: 100 INJECTION, POWDER, LYOPHILIZED, FOR SOLUTION INTRAVENOUS at 09:08

## 2022-01-01 RX ADMIN — OXYCODONE AND ACETAMINOPHEN 1 TABLET: 10; 325 TABLET ORAL at 06:08

## 2022-01-01 RX ADMIN — SODIUM BICARBONATE: 84 INJECTION, SOLUTION INTRAVENOUS at 11:09

## 2022-01-01 RX ADMIN — CEFTRIAXONE 2 G: 2 INJECTION, SOLUTION INTRAVENOUS at 04:09

## 2022-01-01 RX ADMIN — TOLVAPTAN 15 MG: 15 TABLET ORAL at 11:09

## 2022-01-01 RX ADMIN — INSULIN ASPART 6 UNITS: 100 INJECTION, SOLUTION INTRAVENOUS; SUBCUTANEOUS at 11:08

## 2022-01-01 RX ADMIN — HYDROCODONE BITARTRATE AND ACETAMINOPHEN 1 TABLET: 5; 325 TABLET ORAL at 08:09

## 2022-01-01 RX ADMIN — HYDROCODONE BITARTRATE AND ACETAMINOPHEN 2 TABLET: 5; 325 TABLET ORAL at 05:08

## 2022-01-01 RX ADMIN — TACROLIMUS 1 MG: 1 CAPSULE ORAL at 09:08

## 2022-01-01 RX ADMIN — HYDRALAZINE HYDROCHLORIDE 10 MG: 20 INJECTION, SOLUTION INTRAMUSCULAR; INTRAVENOUS at 12:10

## 2022-01-01 RX ADMIN — HYDROCODONE BITARTRATE AND ACETAMINOPHEN 1 TABLET: 5; 325 TABLET ORAL at 03:10

## 2022-01-01 RX ADMIN — INSULIN ASPART 10 UNITS: 100 INJECTION, SOLUTION INTRAVENOUS; SUBCUTANEOUS at 03:09

## 2022-01-01 RX ADMIN — METOPROLOL TARTRATE 25 MG: 25 TABLET, FILM COATED ORAL at 08:08

## 2022-01-01 RX ADMIN — SODIUM CHLORIDE: 0.9 INJECTION, SOLUTION INTRAVENOUS at 08:08

## 2022-01-01 RX ADMIN — GABAPENTIN 300 MG: 300 CAPSULE ORAL at 09:09

## 2022-01-01 RX ADMIN — DICYCLOMINE HYDROCHLORIDE 10 MG: 10 CAPSULE ORAL at 04:10

## 2022-01-01 RX ADMIN — INSULIN ASPART 5 UNITS: 100 INJECTION, SOLUTION INTRAVENOUS; SUBCUTANEOUS at 04:09

## 2022-01-01 RX ADMIN — HYDROMORPHONE HYDROCHLORIDE 1 MG: 1 INJECTION, SOLUTION INTRAMUSCULAR; INTRAVENOUS; SUBCUTANEOUS at 04:10

## 2022-01-01 RX ADMIN — SERTRALINE HYDROCHLORIDE 25 MG: 25 TABLET ORAL at 07:09

## 2022-01-01 RX ADMIN — POTASSIUM CHLORIDE 40 MEQ: 1500 TABLET, EXTENDED RELEASE ORAL at 04:09

## 2022-01-01 RX ADMIN — HEPARIN SODIUM 5000 UNITS: 5000 INJECTION INTRAVENOUS; SUBCUTANEOUS at 01:10

## 2022-01-01 RX ADMIN — Medication 4 TABLET: at 04:10

## 2022-01-01 RX ADMIN — INSULIN ASPART 10 UNITS: 100 INJECTION, SOLUTION INTRAVENOUS; SUBCUTANEOUS at 12:09

## 2022-01-01 RX ADMIN — HYDROCODONE BITARTRATE AND ACETAMINOPHEN 1 TABLET: 10; 325 TABLET ORAL at 02:09

## 2022-01-01 RX ADMIN — THERA TABS 1 TABLET: TAB at 09:08

## 2022-01-01 RX ADMIN — ASPIRIN 81 MG: 81 TABLET, COATED ORAL at 08:10

## 2022-01-01 RX ADMIN — Medication 4 TABLET: at 09:10

## 2022-01-01 RX ADMIN — METHOCARBAMOL 500 MG: 500 TABLET ORAL at 08:08

## 2022-01-01 RX ADMIN — ACETAMINOPHEN 650 MG: 325 TABLET ORAL at 06:08

## 2022-01-01 RX ADMIN — MIDODRINE HYDROCHLORIDE 10 MG: 5 TABLET ORAL at 09:10

## 2022-01-01 RX ADMIN — SODIUM CHLORIDE 500 ML: 0.9 INJECTION, SOLUTION INTRAVENOUS at 01:09

## 2022-01-01 RX ADMIN — METRONIDAZOLE 500 MG: 500 TABLET ORAL at 05:10

## 2022-01-01 RX ADMIN — OXYCODONE AND ACETAMINOPHEN 1 TABLET: 10; 325 TABLET ORAL at 03:08

## 2022-01-01 RX ADMIN — METRONIDAZOLE 500 MG: 500 TABLET ORAL at 10:09

## 2022-01-01 RX ADMIN — MORPHINE SULFATE 2 MG: 4 INJECTION INTRAVENOUS at 09:08

## 2022-01-01 RX ADMIN — LIDOCAINE 1 PATCH: 50 PATCH CUTANEOUS at 01:08

## 2022-01-01 RX ADMIN — MORPHINE SULFATE 2 MG: 4 INJECTION INTRAVENOUS at 02:10

## 2022-01-01 RX ADMIN — MUPIROCIN: 20 OINTMENT TOPICAL at 11:08

## 2022-01-01 RX ADMIN — INSULIN ASPART 2 UNITS: 100 INJECTION, SOLUTION INTRAVENOUS; SUBCUTANEOUS at 09:10

## 2022-01-01 RX ADMIN — MELATONIN TAB 3 MG 6 MG: 3 TAB at 11:10

## 2022-01-01 RX ADMIN — TACROLIMUS 1.5 MG: 1 CAPSULE ORAL at 09:08

## 2022-01-01 RX ADMIN — MIDODRINE HYDROCHLORIDE 5 MG: 5 TABLET ORAL at 07:09

## 2022-01-01 RX ADMIN — INSULIN ASPART 1 UNITS: 100 INJECTION, SOLUTION INTRAVENOUS; SUBCUTANEOUS at 09:09

## 2022-01-01 RX ADMIN — HYDROCODONE BITARTRATE AND ACETAMINOPHEN 2 TABLET: 5; 325 TABLET ORAL at 09:08

## 2022-01-01 RX ADMIN — MELATONIN TAB 3 MG 6 MG: 3 TAB at 09:09

## 2022-01-01 RX ADMIN — MIDODRINE HYDROCHLORIDE 5 MG: 5 TABLET ORAL at 03:10

## 2022-01-01 RX ADMIN — INSULIN ASPART 2 UNITS: 100 INJECTION, SOLUTION INTRAVENOUS; SUBCUTANEOUS at 09:09

## 2022-01-01 RX ADMIN — MELATONIN TAB 3 MG 6 MG: 3 TAB at 08:09

## 2022-01-01 RX ADMIN — ONDANSETRON 4 MG: 2 INJECTION INTRAMUSCULAR; INTRAVENOUS at 04:09

## 2022-01-01 RX ADMIN — HYDROMORPHONE HYDROCHLORIDE 0.2 MG: 2 INJECTION INTRAMUSCULAR; INTRAVENOUS; SUBCUTANEOUS at 04:08

## 2022-01-01 RX ADMIN — DAKIN'S SOLUTION 0.125% (QUARTER STRENGTH): 0.12 SOLUTION at 08:08

## 2022-01-01 RX ADMIN — MUPIROCIN: 20 OINTMENT TOPICAL at 09:08

## 2022-01-01 RX ADMIN — MORPHINE SULFATE 3 MG: 4 INJECTION INTRAVENOUS at 05:10

## 2022-01-01 RX ADMIN — THERA TABS 1 TABLET: TAB at 09:09

## 2022-01-01 RX ADMIN — ONDANSETRON 4 MG: 2 INJECTION INTRAMUSCULAR; INTRAVENOUS at 04:08

## 2022-01-01 RX ADMIN — HYDROCODONE BITARTRATE AND ACETAMINOPHEN 1 TABLET: 5; 325 TABLET ORAL at 01:10

## 2022-01-01 RX ADMIN — MORPHINE SULFATE 2 MG: 4 INJECTION INTRAVENOUS at 05:09

## 2022-01-01 RX ADMIN — LINACLOTIDE 145 MCG: 145 CAPSULE, GELATIN COATED ORAL at 04:10

## 2022-01-01 RX ADMIN — GABAPENTIN 100 MG: 100 CAPSULE ORAL at 10:09

## 2022-01-01 RX ADMIN — TACROLIMUS 1 MG: 1 CAPSULE ORAL at 09:10

## 2022-01-01 RX ADMIN — CARVEDILOL 12.5 MG: 12.5 TABLET, FILM COATED ORAL at 08:10

## 2022-01-01 RX ADMIN — INSULIN DETEMIR 20 UNITS: 100 INJECTION, SOLUTION SUBCUTANEOUS at 11:09

## 2022-01-01 RX ADMIN — TACROLIMUS 1 MG: 1 CAPSULE ORAL at 08:08

## 2022-01-01 RX ADMIN — FUROSEMIDE 40 MG: 10 INJECTION, SOLUTION INTRAMUSCULAR; INTRAVENOUS at 02:09

## 2022-01-01 RX ADMIN — VANCOMYCIN HYDROCHLORIDE 1500 MG: 1.5 INJECTION, POWDER, LYOPHILIZED, FOR SOLUTION INTRAVENOUS at 03:09

## 2022-01-01 RX ADMIN — INSULIN DETEMIR 20 UNITS: 100 INJECTION, SOLUTION SUBCUTANEOUS at 03:09

## 2022-01-01 RX ADMIN — HYDROCORTISONE SODIUM SUCCINATE 80 MG: 100 INJECTION, POWDER, FOR SOLUTION INTRAMUSCULAR; INTRAVENOUS at 11:10

## 2022-01-01 RX ADMIN — LINACLOTIDE 145 MCG: 145 CAPSULE, GELATIN COATED ORAL at 05:10

## 2022-01-01 RX ADMIN — INSULIN ASPART 5 UNITS: 100 INJECTION, SOLUTION INTRAVENOUS; SUBCUTANEOUS at 12:09

## 2022-01-01 RX ADMIN — HYDROCODONE BITARTRATE AND ACETAMINOPHEN 1 TABLET: 5; 325 TABLET ORAL at 06:08

## 2022-01-01 RX ADMIN — INSULIN ASPART 4 UNITS: 100 INJECTION, SOLUTION INTRAVENOUS; SUBCUTANEOUS at 11:10

## 2022-01-01 RX ADMIN — GABAPENTIN 300 MG: 300 CAPSULE ORAL at 03:09

## 2022-01-01 RX ADMIN — DICYCLOMINE HYDROCHLORIDE 10 MG: 10 CAPSULE ORAL at 12:09

## 2022-01-01 RX ADMIN — LIDOCAINE HYDROCHLORIDE 100 MG: 10 INJECTION, SOLUTION EPIDURAL; INFILTRATION; INTRACAUDAL; PERINEURAL at 08:09

## 2022-01-01 RX ADMIN — COLLAGENASE SANTYL: 250 OINTMENT TOPICAL at 11:08

## 2022-01-01 RX ADMIN — INSULIN ASPART 4 UNITS: 100 INJECTION, SOLUTION INTRAVENOUS; SUBCUTANEOUS at 10:09

## 2022-01-01 RX ADMIN — SODIUM BICARBONATE 650 MG TABLET 1300 MG: at 10:10

## 2022-01-01 RX ADMIN — MAGNESIUM SULFATE HEPTAHYDRATE 2 G: 40 INJECTION, SOLUTION INTRAVENOUS at 10:09

## 2022-01-01 RX ADMIN — INSULIN ASPART 4 UNITS: 100 INJECTION, SOLUTION INTRAVENOUS; SUBCUTANEOUS at 11:09

## 2022-01-01 RX ADMIN — Medication 4 TABLET: at 12:09

## 2022-01-01 RX ADMIN — DICYCLOMINE HYDROCHLORIDE 10 MG: 10 CAPSULE ORAL at 09:09

## 2022-01-01 RX ADMIN — HEPARIN SODIUM 5000 UNITS: 5000 INJECTION INTRAVENOUS; SUBCUTANEOUS at 01:09

## 2022-01-01 RX ADMIN — MORPHINE SULFATE 4 MG: 4 INJECTION INTRAVENOUS at 06:08

## 2022-01-01 RX ADMIN — HYDRALAZINE HYDROCHLORIDE 10 MG: 20 INJECTION, SOLUTION INTRAMUSCULAR; INTRAVENOUS at 04:08

## 2022-01-01 RX ADMIN — Medication 4 TABLET: at 06:09

## 2022-01-01 RX ADMIN — HEPARIN SODIUM 5000 UNITS: 5000 INJECTION INTRAVENOUS; SUBCUTANEOUS at 05:10

## 2022-01-01 RX ADMIN — TACROLIMUS 1 MG: 1 CAPSULE ORAL at 07:09

## 2022-01-01 RX ADMIN — METRONIDAZOLE 500 MG: 500 TABLET ORAL at 03:09

## 2022-01-01 RX ADMIN — METRONIDAZOLE 500 MG: 500 TABLET ORAL at 03:10

## 2022-01-01 RX ADMIN — MIDODRINE HYDROCHLORIDE 10 MG: 5 TABLET ORAL at 02:10

## 2022-01-01 RX ADMIN — ALUMINUM HYDROXIDE, MAGNESIUM HYDROXIDE, AND SIMETHICONE 30 ML: 200; 200; 20 SUSPENSION ORAL at 08:08

## 2022-01-01 RX ADMIN — COLLAGENASE SANTYL: 250 OINTMENT TOPICAL at 10:08

## 2022-01-01 RX ADMIN — FLUDROCORTISONE ACETATE 100 MCG: 0.1 TABLET ORAL at 09:10

## 2022-01-01 RX ADMIN — INSULIN ASPART 4 UNITS: 100 INJECTION, SOLUTION INTRAVENOUS; SUBCUTANEOUS at 06:10

## 2022-01-01 RX ADMIN — OXYCODONE HYDROCHLORIDE 15 MG: 5 TABLET ORAL at 01:08

## 2022-01-01 RX ADMIN — INSULIN ASPART 3 UNITS: 100 INJECTION, SOLUTION INTRAVENOUS; SUBCUTANEOUS at 11:10

## 2022-01-01 RX ADMIN — HALOPERIDOL LACTATE 2 MG: 5 INJECTION, SOLUTION INTRAMUSCULAR at 05:10

## 2022-01-01 RX ADMIN — POTASSIUM CHLORIDE 40 MEQ: 1500 TABLET, EXTENDED RELEASE ORAL at 09:09

## 2022-01-01 RX ADMIN — HYDROCODONE BITARTRATE AND ACETAMINOPHEN 1 TABLET: 10; 325 TABLET ORAL at 04:09

## 2022-01-01 RX ADMIN — EPOETIN ALFA-EPBX 4880 UNITS: 4000 INJECTION, SOLUTION INTRAVENOUS; SUBCUTANEOUS at 08:10

## 2022-01-01 RX ADMIN — COLLAGENASE SANTYL: 250 OINTMENT TOPICAL at 09:08

## 2022-01-01 RX ADMIN — SODIUM CHLORIDE, SODIUM LACTATE, POTASSIUM CHLORIDE, AND CALCIUM CHLORIDE 2952 ML: .6; .31; .03; .02 INJECTION, SOLUTION INTRAVENOUS at 03:08

## 2022-01-01 RX ADMIN — HYDROMORPHONE HYDROCHLORIDE 2 MG: 2 TABLET ORAL at 02:10

## 2022-01-01 RX ADMIN — MORPHINE SULFATE 2 MG: 4 INJECTION INTRAVENOUS at 07:08

## 2022-01-01 RX ADMIN — HYDROCODONE BITARTRATE AND ACETAMINOPHEN 1 TABLET: 10; 325 TABLET ORAL at 12:09

## 2022-01-01 RX ADMIN — INSULIN DETEMIR 20 UNITS: 100 INJECTION, SOLUTION SUBCUTANEOUS at 08:09

## 2022-01-01 RX ADMIN — METRONIDAZOLE 500 MG: 500 TABLET ORAL at 04:09

## 2022-01-01 RX ADMIN — SODIUM BICARBONATE 650 MG TABLET 1300 MG: at 02:10

## 2022-01-01 RX ADMIN — MORPHINE SULFATE 2 MG: 4 INJECTION INTRAVENOUS at 05:08

## 2022-01-01 RX ADMIN — MYCOPHENOLATE MOFETIL 250 MG: 250 CAPSULE ORAL at 11:08

## 2022-01-01 RX ADMIN — ASPIRIN 81 MG: 81 TABLET, COATED ORAL at 09:09

## 2022-01-01 RX ADMIN — ONDANSETRON 8 MG: 8 TABLET, ORALLY DISINTEGRATING ORAL at 12:08

## 2022-01-01 RX ADMIN — ONDANSETRON 4 MG: 2 INJECTION INTRAMUSCULAR; INTRAVENOUS at 08:09

## 2022-01-01 RX ADMIN — METHOCARBAMOL 500 MG: 500 TABLET ORAL at 04:08

## 2022-01-01 RX ADMIN — INSULIN ASPART 6 UNITS: 100 INJECTION, SOLUTION INTRAVENOUS; SUBCUTANEOUS at 03:09

## 2022-01-01 RX ADMIN — LIDOCAINE HYDROCHLORIDE 100 MG: 10 INJECTION, SOLUTION EPIDURAL; INFILTRATION; INTRACAUDAL; PERINEURAL at 11:08

## 2022-01-01 RX ADMIN — ONDANSETRON 8 MG: 8 TABLET, ORALLY DISINTEGRATING ORAL at 11:08

## 2022-01-01 RX ADMIN — HALOPERIDOL LACTATE 2 MG: 5 INJECTION, SOLUTION INTRAMUSCULAR at 08:10

## 2022-01-01 RX ADMIN — SERTRALINE HYDROCHLORIDE 25 MG: 25 TABLET ORAL at 10:08

## 2022-01-01 RX ADMIN — CEFEPIME 2 G: 2 INJECTION, POWDER, FOR SOLUTION INTRAVENOUS at 06:09

## 2022-01-01 RX ADMIN — OXYCODONE HYDROCHLORIDE 15 MG: 5 TABLET ORAL at 08:08

## 2022-01-01 RX ADMIN — HYDROCODONE BITARTRATE AND ACETAMINOPHEN 1 TABLET: 10; 325 TABLET ORAL at 07:09

## 2022-01-01 RX ADMIN — CEFTRIAXONE 2 G: 2 INJECTION, SOLUTION INTRAVENOUS at 03:09

## 2022-01-01 RX ADMIN — LIDOCAINE 1 PATCH: 50 PATCH CUTANEOUS at 05:08

## 2022-01-01 RX ADMIN — VANCOMYCIN HYDROCHLORIDE 2000 MG: 10 INJECTION, POWDER, LYOPHILIZED, FOR SOLUTION INTRAVENOUS at 11:09

## 2022-01-01 RX ADMIN — GABAPENTIN 100 MG: 100 CAPSULE ORAL at 02:10

## 2022-01-01 RX ADMIN — GABAPENTIN 100 MG: 100 CAPSULE ORAL at 07:09

## 2022-01-01 RX ADMIN — HYDROMORPHONE HYDROCHLORIDE 2 MG: 2 TABLET ORAL at 04:10

## 2022-01-01 RX ADMIN — CEFEPIME 1 G: 1 INJECTION, POWDER, FOR SOLUTION INTRAMUSCULAR; INTRAVENOUS at 10:08

## 2022-01-01 RX ADMIN — ATORVASTATIN CALCIUM 80 MG: 40 TABLET, FILM COATED ORAL at 10:08

## 2022-01-01 RX ADMIN — SCOPOLAMINE 1 PATCH: 1.5 PATCH, EXTENDED RELEASE TRANSDERMAL at 02:08

## 2022-01-01 RX ADMIN — HYDROMORPHONE HYDROCHLORIDE 2 MG: 2 TABLET ORAL at 09:10

## 2022-01-01 RX ADMIN — CEFAZOLIN SODIUM 2 G: 2 SOLUTION INTRAVENOUS at 06:08

## 2022-01-01 RX ADMIN — SODIUM BICARBONATE 650 MG TABLET 650 MG: at 09:09

## 2022-01-01 RX ADMIN — HEPARIN SODIUM 5000 UNITS: 5000 INJECTION INTRAVENOUS; SUBCUTANEOUS at 02:10

## 2022-01-01 RX ADMIN — HYDROCODONE BITARTRATE AND ACETAMINOPHEN 2 TABLET: 5; 325 TABLET ORAL at 04:08

## 2022-01-01 RX ADMIN — MORPHINE SULFATE 2 MG: 4 INJECTION INTRAVENOUS at 03:08

## 2022-01-01 RX ADMIN — ONDANSETRON 4 MG: 2 INJECTION INTRAMUSCULAR; INTRAVENOUS at 01:09

## 2022-01-01 RX ADMIN — HYDROCODONE BITARTRATE AND ACETAMINOPHEN 1 TABLET: 5; 325 TABLET ORAL at 01:08

## 2022-01-01 RX ADMIN — HYDROCODONE BITARTRATE AND ACETAMINOPHEN 2 TABLET: 5; 325 TABLET ORAL at 06:08

## 2022-01-01 RX ADMIN — GABAPENTIN 300 MG: 300 CAPSULE ORAL at 02:09

## 2022-01-01 RX ADMIN — ACETAMINOPHEN 650 MG: 325 TABLET ORAL at 10:10

## 2022-01-01 RX ADMIN — INSULIN ASPART 6 UNITS: 100 INJECTION, SOLUTION INTRAVENOUS; SUBCUTANEOUS at 04:09

## 2022-01-01 RX ADMIN — CLOPIDOGREL 75 MG: 75 TABLET, FILM COATED ORAL at 09:08

## 2022-01-01 RX ADMIN — ONDANSETRON 4 MG: 2 INJECTION INTRAMUSCULAR; INTRAVENOUS at 07:09

## 2022-01-01 RX ADMIN — HYDROMORPHONE HYDROCHLORIDE 1 MG: 2 INJECTION INTRAMUSCULAR; INTRAVENOUS; SUBCUTANEOUS at 06:10

## 2022-01-01 RX ADMIN — SODIUM BICARBONATE: 84 INJECTION, SOLUTION INTRAVENOUS at 02:09

## 2022-01-01 RX ADMIN — FUROSEMIDE 100 MG: 10 INJECTION, SOLUTION INTRAMUSCULAR; INTRAVENOUS at 02:10

## 2022-01-01 RX ADMIN — OXYCODONE AND ACETAMINOPHEN 1 TABLET: 10; 325 TABLET ORAL at 09:08

## 2022-01-01 RX ADMIN — MUPIROCIN: 20 OINTMENT TOPICAL at 09:09

## 2022-01-01 RX ADMIN — ONDANSETRON 4 MG: 2 INJECTION INTRAMUSCULAR; INTRAVENOUS at 05:10

## 2022-01-01 RX ADMIN — LINEZOLID 600 MG: 600 INJECTION, SOLUTION INTRAVENOUS at 12:09

## 2022-01-01 RX ADMIN — MORPHINE SULFATE 2 MG: 4 INJECTION INTRAVENOUS at 04:09

## 2022-01-01 RX ADMIN — HYDROCODONE BITARTRATE AND ACETAMINOPHEN 1 TABLET: 5; 325 TABLET ORAL at 03:09

## 2022-01-01 RX ADMIN — HYDROCORTISONE SODIUM SUCCINATE 40 MG: 100 INJECTION, POWDER, FOR SOLUTION INTRAMUSCULAR; INTRAVENOUS at 11:10

## 2022-01-01 RX ADMIN — MORPHINE SULFATE 4 MG: 4 INJECTION INTRAVENOUS at 08:08

## 2022-01-01 RX ADMIN — THERA TABS 1 TABLET: TAB at 10:10

## 2022-01-01 RX ADMIN — INSULIN ASPART 6 UNITS: 100 INJECTION, SOLUTION INTRAVENOUS; SUBCUTANEOUS at 07:09

## 2022-01-01 RX ADMIN — FUROSEMIDE 40 MG: 10 INJECTION, SOLUTION INTRAMUSCULAR; INTRAVENOUS at 07:09

## 2022-01-01 RX ADMIN — DEXAMETHASONE 6 MG: 4 TABLET ORAL at 09:09

## 2022-01-01 RX ADMIN — HYDROCODONE BITARTRATE AND ACETAMINOPHEN 1 TABLET: 5; 325 TABLET ORAL at 08:08

## 2022-01-01 RX ADMIN — ROCURONIUM BROMIDE 5 MG: 10 INJECTION, SOLUTION INTRAVENOUS at 08:09

## 2022-01-01 RX ADMIN — INSULIN ASPART 10 UNITS: 100 INJECTION, SOLUTION INTRAVENOUS; SUBCUTANEOUS at 01:09

## 2022-01-01 RX ADMIN — ONDANSETRON 4 MG: 2 INJECTION INTRAMUSCULAR; INTRAVENOUS at 06:09

## 2022-01-01 RX ADMIN — ONDANSETRON 4 MG: 2 INJECTION, SOLUTION INTRAMUSCULAR; INTRAVENOUS at 09:10

## 2022-01-01 RX ADMIN — INSULIN ASPART 1 UNITS: 100 INJECTION, SOLUTION INTRAVENOUS; SUBCUTANEOUS at 09:10

## 2022-01-01 RX ADMIN — METHOCARBAMOL 500 MG: 500 TABLET ORAL at 09:08

## 2022-01-01 RX ADMIN — HYDROCORTISONE SODIUM SUCCINATE 40 MG: 100 INJECTION, POWDER, FOR SOLUTION INTRAMUSCULAR; INTRAVENOUS at 01:10

## 2022-01-01 RX ADMIN — FENTANYL CITRATE 25 MCG: 50 INJECTION, SOLUTION INTRAMUSCULAR; INTRAVENOUS at 10:09

## 2022-01-01 RX ADMIN — MORPHINE SULFATE 4 MG: 4 INJECTION INTRAVENOUS at 10:09

## 2022-01-01 RX ADMIN — EPOETIN ALFA-EPBX 4880 UNITS: 4000 INJECTION, SOLUTION INTRAVENOUS; SUBCUTANEOUS at 10:09

## 2022-01-01 RX ADMIN — MORPHINE SULFATE 3 MG: 4 INJECTION INTRAVENOUS at 11:10

## 2022-01-01 RX ADMIN — PROPOFOL 100 MG: 10 INJECTION, EMULSION INTRAVENOUS at 08:09

## 2022-01-01 RX ADMIN — MIDODRINE HYDROCHLORIDE 5 MG: 5 TABLET ORAL at 09:10

## 2022-01-01 RX ADMIN — MIDODRINE HYDROCHLORIDE 10 MG: 5 TABLET ORAL at 05:10

## 2022-01-01 RX ADMIN — DEXAMETHASONE 2 MG: 1 TABLET ORAL at 09:09

## 2022-01-01 RX ADMIN — ONDANSETRON 8 MG: 8 TABLET, ORALLY DISINTEGRATING ORAL at 02:08

## 2022-01-01 RX ADMIN — Medication 400 MG: at 08:10

## 2022-01-01 RX ADMIN — SODIUM CHLORIDE: 0.9 INJECTION, SOLUTION INTRAVENOUS at 12:09

## 2022-01-01 RX ADMIN — HYDROCODONE BITARTRATE AND ACETAMINOPHEN 1 TABLET: 10; 325 TABLET ORAL at 10:09

## 2022-01-01 RX ADMIN — MIDODRINE HYDROCHLORIDE 10 MG: 5 TABLET ORAL at 03:10

## 2022-01-01 RX ADMIN — MORPHINE SULFATE 3 MG: 4 INJECTION INTRAVENOUS at 01:10

## 2022-01-01 RX ADMIN — INSULIN DETEMIR 30 UNITS: 100 INJECTION, SOLUTION SUBCUTANEOUS at 09:09

## 2022-01-01 RX ADMIN — GABAPENTIN 100 MG: 100 CAPSULE ORAL at 05:10

## 2022-01-01 RX ADMIN — SODIUM CHLORIDE: 0.9 INJECTION, SOLUTION INTRAVENOUS at 11:10

## 2022-01-01 RX ADMIN — FUROSEMIDE 20 MG: 10 INJECTION, SOLUTION INTRAMUSCULAR; INTRAVENOUS at 04:10

## 2022-01-01 RX ADMIN — INSULIN ASPART 1 UNITS: 100 INJECTION, SOLUTION INTRAVENOUS; SUBCUTANEOUS at 12:08

## 2022-01-01 RX ADMIN — ONDANSETRON 4 MG: 2 INJECTION INTRAMUSCULAR; INTRAVENOUS at 08:08

## 2022-01-01 RX ADMIN — TACROLIMUS 1 MG: 1 CAPSULE ORAL at 06:09

## 2022-01-01 RX ADMIN — SODIUM CHLORIDE, SODIUM LACTATE, POTASSIUM CHLORIDE, AND CALCIUM CHLORIDE: 600; 310; 30; 20 INJECTION, SOLUTION INTRAVENOUS at 08:09

## 2022-01-01 RX ADMIN — METHOCARBAMOL 500 MG: 500 TABLET ORAL at 01:08

## 2022-01-01 RX ADMIN — Medication 400 MG: at 02:10

## 2022-01-01 RX ADMIN — ONDANSETRON 4 MG: 2 INJECTION, SOLUTION INTRAMUSCULAR; INTRAVENOUS at 09:09

## 2022-01-01 RX ADMIN — THERA TABS 1 TABLET: TAB at 08:10

## 2022-01-01 RX ADMIN — HYDROMORPHONE HYDROCHLORIDE 0.2 MG: 2 INJECTION INTRAMUSCULAR; INTRAVENOUS; SUBCUTANEOUS at 12:10

## 2022-01-01 RX ADMIN — PROPOFOL 80 MG: 10 INJECTION, EMULSION INTRAVENOUS at 09:10

## 2022-01-01 RX ADMIN — SODIUM CHLORIDE: 9 INJECTION, SOLUTION INTRAVENOUS at 11:09

## 2022-01-01 RX ADMIN — CEFAZOLIN SODIUM 2 G: 2 SOLUTION INTRAVENOUS at 01:08

## 2022-01-01 RX ADMIN — MAGNESIUM OXIDE TAB 400 MG (241.3 MG ELEMENTAL MG) 400 MG: 400 (241.3 MG) TAB at 12:09

## 2022-01-01 RX ADMIN — SIMETHICONE 160 MG: 80 TABLET, CHEWABLE ORAL at 08:09

## 2022-01-01 RX ADMIN — EPHEDRINE SULFATE 15 MG: 50 INJECTION INTRAVENOUS at 08:09

## 2022-01-01 RX ADMIN — COLLAGENASE SANTYL: 250 OINTMENT TOPICAL at 08:08

## 2022-01-01 RX ADMIN — POTASSIUM CHLORIDE 40 MEQ: 1500 TABLET, EXTENDED RELEASE ORAL at 03:09

## 2022-01-01 RX ADMIN — GABAPENTIN 100 MG: 100 CAPSULE ORAL at 03:09

## 2022-01-01 RX ADMIN — HALOPERIDOL LACTATE 2 MG: 5 INJECTION, SOLUTION INTRAMUSCULAR at 04:10

## 2022-01-01 RX ADMIN — MORPHINE SULFATE 2 MG: 4 INJECTION INTRAVENOUS at 03:09

## 2022-01-01 RX ADMIN — ACETAMINOPHEN 650 MG: 325 TABLET ORAL at 11:09

## 2022-01-01 RX ADMIN — TACROLIMUS 1 MG: 1 CAPSULE ORAL at 10:10

## 2022-01-01 RX ADMIN — DICYCLOMINE HYDROCHLORIDE 10 MG: 10 CAPSULE ORAL at 08:09

## 2022-01-01 RX ADMIN — Medication 4 TABLET: at 08:09

## 2022-01-01 RX ADMIN — MAGNESIUM OXIDE TAB 400 MG (241.3 MG ELEMENTAL MG) 400 MG: 400 (241.3 MG) TAB at 10:10

## 2022-01-01 RX ADMIN — INSULIN ASPART 4 UNITS: 100 INJECTION, SOLUTION INTRAVENOUS; SUBCUTANEOUS at 08:10

## 2022-01-01 RX ADMIN — INSULIN ASPART 4 UNITS: 100 INJECTION, SOLUTION INTRAVENOUS; SUBCUTANEOUS at 12:10

## 2022-01-01 RX ADMIN — MORPHINE SULFATE 2 MG: 4 INJECTION INTRAVENOUS at 08:10

## 2022-01-01 RX ADMIN — HYDROCODONE BITARTRATE AND ACETAMINOPHEN 2 TABLET: 5; 325 TABLET ORAL at 10:08

## 2022-01-01 RX ADMIN — MUPIROCIN: 20 OINTMENT TOPICAL at 03:08

## 2022-01-01 RX ADMIN — HYDROMORPHONE HYDROCHLORIDE 1 MG: 1 INJECTION, SOLUTION INTRAMUSCULAR; INTRAVENOUS; SUBCUTANEOUS at 02:10

## 2022-01-01 RX ADMIN — ROCURONIUM BROMIDE 5 MG: 10 INJECTION, SOLUTION INTRAVENOUS at 09:10

## 2022-01-01 RX ADMIN — INSULIN DETEMIR 20 UNITS: 100 INJECTION, SOLUTION SUBCUTANEOUS at 06:09

## 2022-01-01 RX ADMIN — HEPARIN SODIUM 5000 UNITS: 5000 INJECTION INTRAVENOUS; SUBCUTANEOUS at 04:10

## 2022-01-01 RX ADMIN — EPOETIN ALFA-EPBX 4880 UNITS: 4000 INJECTION, SOLUTION INTRAVENOUS; SUBCUTANEOUS at 09:09

## 2022-01-01 RX ADMIN — SODIUM CHLORIDE: 0.9 INJECTION, SOLUTION INTRAVENOUS at 05:08

## 2022-01-01 RX ADMIN — ASPIRIN 81 MG: 81 TABLET, COATED ORAL at 09:08

## 2022-01-01 RX ADMIN — APIXABAN 2.5 MG: 2.5 TABLET, FILM COATED ORAL at 09:09

## 2022-01-01 RX ADMIN — INSULIN ASPART 8 UNITS: 100 INJECTION, SOLUTION INTRAVENOUS; SUBCUTANEOUS at 06:09

## 2022-01-01 RX ADMIN — MAGNESIUM SULFATE HEPTAHYDRATE 2 G: 40 INJECTION, SOLUTION INTRAVENOUS at 11:09

## 2022-01-01 RX ADMIN — HYDROCODONE BITARTRATE AND ACETAMINOPHEN 1 TABLET: 5; 325 TABLET ORAL at 10:10

## 2022-01-01 RX ADMIN — INSULIN ASPART 2 UNITS: 100 INJECTION, SOLUTION INTRAVENOUS; SUBCUTANEOUS at 05:09

## 2022-01-01 RX ADMIN — INSULIN ASPART 1 UNITS: 100 INJECTION, SOLUTION INTRAVENOUS; SUBCUTANEOUS at 04:09

## 2022-01-01 RX ADMIN — HYDROMORPHONE HYDROCHLORIDE 1 MG: 1 INJECTION, SOLUTION INTRAMUSCULAR; INTRAVENOUS; SUBCUTANEOUS at 05:10

## 2022-01-01 RX ADMIN — ONDANSETRON 4 MG: 2 INJECTION INTRAMUSCULAR; INTRAVENOUS at 07:10

## 2022-01-01 RX ADMIN — MAGNESIUM OXIDE TAB 400 MG (241.3 MG ELEMENTAL MG) 400 MG: 400 (241.3 MG) TAB at 07:09

## 2022-01-01 RX ADMIN — MELATONIN TAB 3 MG 6 MG: 3 TAB at 10:09

## 2022-01-01 RX ADMIN — ACETAMINOPHEN 650 MG: 325 TABLET ORAL at 09:08

## 2022-01-01 RX ADMIN — INSULIN ASPART 2 UNITS: 100 INJECTION, SOLUTION INTRAVENOUS; SUBCUTANEOUS at 08:08

## 2022-01-01 RX ADMIN — CARVEDILOL 12.5 MG: 12.5 TABLET, FILM COATED ORAL at 10:10

## 2022-01-01 RX ADMIN — LIDOCAINE HYDROCHLORIDE 70 MG: 10 INJECTION, SOLUTION EPIDURAL; INFILTRATION; INTRACAUDAL; PERINEURAL at 10:09

## 2022-01-01 RX ADMIN — OXYCODONE AND ACETAMINOPHEN 1 TABLET: 10; 325 TABLET ORAL at 05:08

## 2022-01-01 RX ADMIN — INSULIN DETEMIR 5 UNITS: 100 INJECTION, SOLUTION SUBCUTANEOUS at 07:10

## 2022-01-01 RX ADMIN — HYDROMORPHONE HYDROCHLORIDE 1 MG: 1 INJECTION, SOLUTION INTRAMUSCULAR; INTRAVENOUS; SUBCUTANEOUS at 03:10

## 2022-01-01 RX ADMIN — INSULIN ASPART 3 UNITS: 100 INJECTION, SOLUTION INTRAVENOUS; SUBCUTANEOUS at 11:09

## 2022-01-01 RX ADMIN — Medication 4 TABLET: at 12:10

## 2022-01-01 RX ADMIN — MIDODRINE HYDROCHLORIDE 5 MG: 5 TABLET ORAL at 05:10

## 2022-01-01 RX ADMIN — HYDROCODONE BITARTRATE AND ACETAMINOPHEN 1 TABLET: 5; 325 TABLET ORAL at 08:10

## 2022-01-01 RX ADMIN — HYDRALAZINE HYDROCHLORIDE 10 MG: 20 INJECTION, SOLUTION INTRAMUSCULAR; INTRAVENOUS at 06:08

## 2022-01-01 RX ADMIN — INSULIN ASPART 10 UNITS: 100 INJECTION, SOLUTION INTRAVENOUS; SUBCUTANEOUS at 11:09

## 2022-01-01 RX ADMIN — SODIUM CHLORIDE: 0.9 INJECTION, SOLUTION INTRAVENOUS at 08:09

## 2022-01-01 RX ADMIN — DOXYCYCLINE 100 MG: 100 INJECTION, POWDER, LYOPHILIZED, FOR SOLUTION INTRAVENOUS at 04:09

## 2022-01-01 RX ADMIN — HYDROCODONE BITARTRATE AND ACETAMINOPHEN 1 TABLET: 5; 325 TABLET ORAL at 02:10

## 2022-01-01 RX ADMIN — CEFAZOLIN 2 G: 1 INJECTION, POWDER, FOR SOLUTION INTRAMUSCULAR; INTRAVENOUS at 02:08

## 2022-01-01 RX ADMIN — CEFTRIAXONE 2 G: 2 INJECTION, SOLUTION INTRAVENOUS at 05:10

## 2022-01-01 RX ADMIN — CEFAZOLIN SODIUM 2 G: 2 SOLUTION INTRAVENOUS at 10:08

## 2022-01-01 RX ADMIN — NITROGLYCERIN 0.4 MG: 0.4 TABLET SUBLINGUAL at 01:08

## 2022-01-01 RX ADMIN — SODIUM CHLORIDE: 0.45 INJECTION, SOLUTION INTRAVENOUS at 01:09

## 2022-01-01 RX ADMIN — PROPOFOL 50 MCG/KG/MIN: 10 INJECTION, EMULSION INTRAVENOUS at 02:08

## 2022-01-01 RX ADMIN — DEXAMETHASONE SODIUM PHOSPHATE 4 MG: 4 INJECTION, SOLUTION INTRAMUSCULAR; INTRAVENOUS at 08:09

## 2022-01-01 RX ADMIN — OXYCODONE AND ACETAMINOPHEN 1 TABLET: 10; 325 TABLET ORAL at 01:08

## 2022-01-01 RX ADMIN — OXYCODONE AND ACETAMINOPHEN 1 TABLET: 7.5; 325 TABLET ORAL at 12:09

## 2022-01-01 RX ADMIN — INSULIN ASPART 4 UNITS: 100 INJECTION, SOLUTION INTRAVENOUS; SUBCUTANEOUS at 04:09

## 2022-01-01 RX ADMIN — CHLORHEXIDINE GLUCONATE 0.12% ORAL RINSE 10 ML: 1.2 LIQUID ORAL at 10:10

## 2022-01-01 RX ADMIN — CEFEPIME HYDROCHLORIDE 2 G: 2 INJECTION, SOLUTION INTRAVENOUS at 03:09

## 2022-01-01 RX ADMIN — SERTRALINE HYDROCHLORIDE 25 MG: 25 TABLET ORAL at 12:09

## 2022-01-01 RX ADMIN — CEFTRIAXONE 2 G: 2 INJECTION, SOLUTION INTRAVENOUS at 03:10

## 2022-01-01 RX ADMIN — INSULIN ASPART 4 UNITS: 100 INJECTION, SOLUTION INTRAVENOUS; SUBCUTANEOUS at 09:09

## 2022-01-01 RX ADMIN — DICYCLOMINE HYDROCHLORIDE 10 MG: 10 CAPSULE ORAL at 03:10

## 2022-01-01 RX ADMIN — DAKIN'S SOLUTION 0.125% (QUARTER STRENGTH): 0.12 SOLUTION at 11:08

## 2022-01-01 RX ADMIN — TACROLIMUS 0.5 MG: 0.5 CAPSULE ORAL at 06:10

## 2022-01-01 RX ADMIN — ONDANSETRON 4 MG: 2 INJECTION INTRAMUSCULAR; INTRAVENOUS at 11:10

## 2022-01-01 RX ADMIN — HYDROCODONE BITARTRATE AND ACETAMINOPHEN 2 TABLET: 5; 325 TABLET ORAL at 02:08

## 2022-01-01 RX ADMIN — ATORVASTATIN CALCIUM 80 MG: 40 TABLET, FILM COATED ORAL at 08:09

## 2022-01-01 RX ADMIN — FUROSEMIDE 40 MG: 10 INJECTION, SOLUTION INTRAMUSCULAR; INTRAVENOUS at 05:09

## 2022-01-01 RX ADMIN — ACETAMINOPHEN 650 MG: 325 TABLET ORAL at 02:09

## 2022-01-01 RX ADMIN — INSULIN ASPART 5 UNITS: 100 INJECTION, SOLUTION INTRAVENOUS; SUBCUTANEOUS at 09:09

## 2022-01-01 RX ADMIN — FENTANYL CITRATE 50 MCG: 50 INJECTION, SOLUTION INTRAMUSCULAR; INTRAVENOUS at 08:09

## 2022-01-01 RX ADMIN — INSULIN DETEMIR 16 UNITS: 100 INJECTION, SOLUTION SUBCUTANEOUS at 12:09

## 2022-01-01 RX ADMIN — CEFEPIME HYDROCHLORIDE 1 G: 1 INJECTION, SOLUTION INTRAVENOUS at 03:09

## 2022-01-01 RX ADMIN — INSULIN ASPART 3 UNITS: 100 INJECTION, SOLUTION INTRAVENOUS; SUBCUTANEOUS at 06:09

## 2022-01-01 RX ADMIN — Medication 0.04 MCG/KG/MIN: at 02:10

## 2022-01-01 RX ADMIN — SUCCINYLCHOLINE CHLORIDE 80 MG: 20 INJECTION, SOLUTION INTRAMUSCULAR; INTRAVENOUS at 09:10

## 2022-01-01 RX ADMIN — INSULIN ASPART 2 UNITS: 100 INJECTION, SOLUTION INTRAVENOUS; SUBCUTANEOUS at 09:08

## 2022-01-01 RX ADMIN — MAGNESIUM SULFATE 1 G: 1 INJECTION INTRAVENOUS at 03:10

## 2022-01-01 RX ADMIN — ACETAMINOPHEN 1000 MG: 500 TABLET ORAL at 08:08

## 2022-01-01 RX ADMIN — HYDROMORPHONE HYDROCHLORIDE 1 MG: 1 INJECTION, SOLUTION INTRAMUSCULAR; INTRAVENOUS; SUBCUTANEOUS at 11:10

## 2022-01-01 RX ADMIN — SODIUM CHLORIDE: 9 INJECTION, SOLUTION INTRAVENOUS at 08:09

## 2022-01-01 RX ADMIN — AMPICILLIN AND SULBACTAM 3 G: 2; 1 INJECTION, POWDER, FOR SOLUTION INTRAMUSCULAR; INTRAVENOUS at 12:09

## 2022-01-01 RX ADMIN — CARVEDILOL 12.5 MG: 12.5 TABLET, FILM COATED ORAL at 12:09

## 2022-01-01 RX ADMIN — ONDANSETRON 4 MG: 2 INJECTION INTRAMUSCULAR; INTRAVENOUS at 08:10

## 2022-01-01 RX ADMIN — METRONIDAZOLE 500 MG: 500 TABLET ORAL at 06:09

## 2022-01-01 RX ADMIN — LORAZEPAM 0.5 MG: 2 INJECTION INTRAMUSCULAR; INTRAVENOUS at 03:10

## 2022-01-01 RX ADMIN — INSULIN DETEMIR 3 UNITS: 100 INJECTION, SOLUTION SUBCUTANEOUS at 07:10

## 2022-01-01 RX ADMIN — SODIUM CHLORIDE, POTASSIUM CHLORIDE, SODIUM LACTATE AND CALCIUM CHLORIDE: 600; 310; 30; 20 INJECTION, SOLUTION INTRAVENOUS at 08:10

## 2022-01-01 RX ADMIN — MEPERIDINE HYDROCHLORIDE 12.5 MG: 25 INJECTION INTRAMUSCULAR; INTRAVENOUS; SUBCUTANEOUS at 04:08

## 2022-01-01 RX ADMIN — GABAPENTIN 100 MG: 100 CAPSULE ORAL at 10:10

## 2022-01-01 RX ADMIN — MORPHINE SULFATE 3 MG: 4 INJECTION INTRAVENOUS at 09:10

## 2022-01-01 RX ADMIN — MICONAZOLE NITRATE: 20 OINTMENT TOPICAL at 02:10

## 2022-01-01 RX ADMIN — MORPHINE SULFATE 2 MG: 4 INJECTION INTRAVENOUS at 10:09

## 2022-01-01 RX ADMIN — SODIUM CHLORIDE: 9 INJECTION, SOLUTION INTRAVENOUS at 12:09

## 2022-01-01 RX ADMIN — ASPIRIN 81 MG: 81 TABLET, COATED ORAL at 10:10

## 2022-01-01 RX ADMIN — SODIUM CHLORIDE: 0.9 INJECTION, SOLUTION INTRAVENOUS at 03:10

## 2022-01-01 RX ADMIN — INSULIN ASPART 8 UNITS: 100 INJECTION, SOLUTION INTRAVENOUS; SUBCUTANEOUS at 11:10

## 2022-01-01 RX ADMIN — TACROLIMUS 0.5 MG: 0.5 CAPSULE ORAL at 11:08

## 2022-01-01 RX ADMIN — HYDROCODONE BITARTRATE AND ACETAMINOPHEN 2 TABLET: 5; 325 TABLET ORAL at 12:08

## 2022-01-01 RX ADMIN — INSULIN ASPART 10 UNITS: 100 INJECTION, SOLUTION INTRAVENOUS; SUBCUTANEOUS at 11:10

## 2022-01-01 RX ADMIN — SERTRALINE HYDROCHLORIDE 25 MG: 25 TABLET ORAL at 10:09

## 2022-01-01 RX ADMIN — INSULIN ASPART 2 UNITS: 100 INJECTION, SOLUTION INTRAVENOUS; SUBCUTANEOUS at 04:09

## 2022-01-01 RX ADMIN — SODIUM CHLORIDE: 0.9 INJECTION, SOLUTION INTRAVENOUS at 02:09

## 2022-01-01 RX ADMIN — ONDANSETRON 4 MG: 2 INJECTION, SOLUTION INTRAMUSCULAR; INTRAVENOUS at 03:09

## 2022-01-01 RX ADMIN — SODIUM CHLORIDE: 0.9 INJECTION, SOLUTION INTRAVENOUS at 03:09

## 2022-01-01 RX ADMIN — HEPARIN SODIUM 5000 UNITS: 5000 INJECTION INTRAVENOUS; SUBCUTANEOUS at 08:09

## 2022-01-01 RX ADMIN — FUROSEMIDE 20 MG: 10 INJECTION, SOLUTION INTRAMUSCULAR; INTRAVENOUS at 05:08

## 2022-01-01 RX ADMIN — INSULIN ASPART 4 UNITS: 100 INJECTION, SOLUTION INTRAVENOUS; SUBCUTANEOUS at 05:10

## 2022-01-01 RX ADMIN — INSULIN ASPART 4 UNITS: 100 INJECTION, SOLUTION INTRAVENOUS; SUBCUTANEOUS at 08:08

## 2022-01-01 RX ADMIN — INSULIN ASPART 10 UNITS: 100 INJECTION, SOLUTION INTRAVENOUS; SUBCUTANEOUS at 04:09

## 2022-01-01 RX ADMIN — HYDROCODONE BITARTRATE AND ACETAMINOPHEN 1 TABLET: 10; 325 TABLET ORAL at 04:10

## 2022-01-01 RX ADMIN — TACROLIMUS 1 MG: 1 CAPSULE ORAL at 12:09

## 2022-01-01 RX ADMIN — DEXAMETHASONE 6 MG: 4 TABLET ORAL at 09:08

## 2022-01-01 RX ADMIN — GUAIFENESIN 200 MG: 200 SOLUTION ORAL at 10:08

## 2022-01-01 RX ADMIN — MYCOPHENOLATE MOFETIL 250 MG: 250 CAPSULE ORAL at 10:09

## 2022-01-01 RX ADMIN — HYDROMORPHONE HYDROCHLORIDE 2 MG: 2 TABLET ORAL at 03:10

## 2022-01-01 RX ADMIN — INSULIN DETEMIR 16 UNITS: 100 INJECTION, SOLUTION SUBCUTANEOUS at 11:09

## 2022-01-01 RX ADMIN — INSULIN ASPART 2 UNITS: 100 INJECTION, SOLUTION INTRAVENOUS; SUBCUTANEOUS at 06:09

## 2022-01-01 RX ADMIN — ALBUTEROL SULFATE 2 PUFF: 90 AEROSOL, METERED RESPIRATORY (INHALATION) at 08:08

## 2022-01-01 RX ADMIN — REMDESIVIR 200 MG: 100 INJECTION, POWDER, LYOPHILIZED, FOR SOLUTION INTRAVENOUS at 08:08

## 2022-01-01 RX ADMIN — TACROLIMUS 1.5 MG: 1 CAPSULE ORAL at 07:08

## 2022-01-01 RX ADMIN — METOPROLOL TARTRATE 25 MG: 25 TABLET, FILM COATED ORAL at 10:08

## 2022-01-01 RX ADMIN — BENZONATATE 100 MG: 100 CAPSULE ORAL at 03:08

## 2022-01-01 RX ADMIN — INSULIN ASPART 1 UNITS: 100 INJECTION, SOLUTION INTRAVENOUS; SUBCUTANEOUS at 11:09

## 2022-01-01 RX ADMIN — HYDROCORTISONE SODIUM SUCCINATE 40 MG: 100 INJECTION, POWDER, FOR SOLUTION INTRAMUSCULAR; INTRAVENOUS at 12:10

## 2022-01-01 RX ADMIN — SODIUM BICARBONATE 650 MG TABLET 650 MG: at 02:09

## 2022-01-01 RX ADMIN — CEFEPIME 1 G: 1 INJECTION, POWDER, FOR SOLUTION INTRAMUSCULAR; INTRAVENOUS at 08:08

## 2022-01-01 RX ADMIN — HYDRALAZINE HYDROCHLORIDE 10 MG: 20 INJECTION, SOLUTION INTRAMUSCULAR; INTRAVENOUS at 01:08

## 2022-01-01 RX ADMIN — DAKIN'S SOLUTION 0.125% (QUARTER STRENGTH): 0.12 SOLUTION at 10:08

## 2022-01-01 RX ADMIN — OXYCODONE HYDROCHLORIDE AND ACETAMINOPHEN 500 MG: 500 TABLET ORAL at 09:08

## 2022-01-01 RX ADMIN — CARVEDILOL 12.5 MG: 12.5 TABLET, FILM COATED ORAL at 10:09

## 2022-01-01 RX ADMIN — BUPIVACAINE HYDROCHLORIDE 30 ML: 5 INJECTION, SOLUTION EPIDURAL; INTRACAUDAL; PERINEURAL at 09:08

## 2022-01-01 RX ADMIN — ATORVASTATIN CALCIUM 80 MG: 40 TABLET, FILM COATED ORAL at 11:09

## 2022-01-01 RX ADMIN — SODIUM CHLORIDE, SODIUM LACTATE, POTASSIUM CHLORIDE, AND CALCIUM CHLORIDE 1000 ML: .6; .31; .03; .02 INJECTION, SOLUTION INTRAVENOUS at 03:08

## 2022-02-08 RX ORDER — CLOPIDOGREL BISULFATE 75 MG/1
75 TABLET ORAL DAILY
Qty: 30 TABLET | Refills: 0 | OUTPATIENT
Start: 2022-02-08 | End: 2023-01-01

## 2022-03-15 RX ORDER — CLOPIDOGREL BISULFATE 75 MG/1
75 TABLET ORAL DAILY
Qty: 90 TABLET | Refills: 3 | OUTPATIENT
Start: 2022-03-15 | End: 2023-01-01

## 2022-03-15 NOTE — TELEPHONE ENCOUNTER
Attempted to contact the patient to schedule office visit, no answer, voicemail not set up, left message at pharmacy.

## 2022-06-27 NOTE — TELEPHONE ENCOUNTER
----- Message from Gretchen Valdez sent at 6/27/2022 11:56 AM CDT -----  Contact: Self [phone]  Would like to receive medical advice.    Would they like a call back or a response via [facility]:  call back    Additional information:  Calling to speak with the nurse regarding advice. Pt was a pt of Dr. Calzada and have complaints of two tender lumps in breast area. I offered pt an appt, but declined and would like to speak with a nurse only.

## 2022-06-29 NOTE — TELEPHONE ENCOUNTER
Notified the patient that PA for the Duo-Neb was approved under his Medicare Part B plan.    Notified the patient that I called his [facility] to notify them to bill under Medicare Part B.    Patient verbalized understanding and was thankful for the call.       No

## 2022-06-29 NOTE — HPI
Lavelle Ladd is a pleasant 62 y old  man  who received a  - ( brain death ) kidney transplant on 16.  He has CKD stage 3 - GFR 30-59 and his baseline creatinine is between 1.6-1.8. He takes  prednisone and tacrolimus for maintenance immunosuppression.  MMF was stopped few months ago , s/p right BKA in  , has dry gangrene on his left great toe for few days, now developed purulent discharge, admitted for management,  Podiatry consulted , renal fn at his baseline , several admissions in the past few months for same reason ,    Perineural Invasion (For Histology - Be Specific If Possible): absent

## 2022-08-04 PROBLEM — S82.402A CLOSED FRACTURE OF LEFT TIBIA AND FIBULA: Status: ACTIVE | Noted: 2022-01-01

## 2022-08-04 PROBLEM — S82.202A CLOSED FRACTURE OF LEFT TIBIA AND FIBULA: Status: ACTIVE | Noted: 2022-01-01

## 2022-08-04 NOTE — ASSESSMENT & PLAN NOTE
Patient's FSGs are uncontrolled due to hyperglycemia on current medication regimen.  Last A1c reviewed-   Lab Results   Component Value Date    HGBA1C 9.1 (H) 06/26/2020     Most recent fingerstick glucose reviewed- No results for input(s): POCTGLUCOSE in the last 24 hours.  Current correctional scale  High  Maintain anti-hyperglycemic dose as follows-   Antihyperglycemics (From admission, onward)            Start     Stop Route Frequency Ordered    08/04/22 0845  insulin aspart U-100 pen 1-10 Units         -- SubQ Every 6 hours PRN 08/04/22 0745        Hold Oral hypoglycemics while patient is in the hospital.

## 2022-08-04 NOTE — SUBJECTIVE & OBJECTIVE
Past Medical History:   Diagnosis Date    DINORAH (acute kidney injury) 2016    Arthritis     CAD in native artery 2019    CHF (congestive heart failure)     Chronic obstructive pulmonary disease 2016    Coronary artery disease involving native coronary artery of native heart without angina pectoris 2016    CRI (chronic renal insufficiency) 2019     donor kidney transplant for DM 16     Induction with Thymo x3 and IV solumedrol to total 875mg  Kidney Biopsy  2016: 16 glomeruli, ACR type 1 AVR type 2, significant microcirculatory changes, c4d negative, No DSA, 5 to10% fibrosis. Treated with thymo x8 2016- no rejection      Diastolic heart failure     Encounter for blood transfusion     ESRD on RRT since 10/2013 10/29/2013    Biopsy proven diabetic nephropathy and lymphoplasmacytic interstitial infiltrate not c/w with AIN (ddx sjogrens or assoc with tamm-horsefall protein extravasation)     GERD (gastroesophageal reflux disease)     History of hepatitis C, s/p successful Rx w/ SVR12 - 2017    Completed 12 weeks harvoni w/ SVR    Hyperlipidemia     Hypertension     PAD (peripheral artery disease) 2019    PIC line (peripherally inserted central catheter) flush     Prophylactic immunotherapy     Proteinuria     PVD (peripheral vascular disease) 2017    RLE BKA CT 16 Extensive atherosclerotic disease of the aorta and mesenteric arteries.     Renal hypertension     Type 2 diabetes mellitus with diabetic neuropathy, with long-term current use of insulin 2016    Vitamin B12 deficiency        Past Surgical History:   Procedure Laterality Date    AORTOGRAPHY WITH SERIALOGRAPHY N/A 2018    Procedure: LEFT LEG ANGIOGRAM;  Surgeon: Donal Mcdonald MD;  Location: [facility] CATH LAB;  Service: Vascular;  Laterality: N/A;    av bovine graft      Left UE    AV FISTULA PLACEMENT      left UE    CARDIAC CATHETERIZATION  2015    CLOSURE OF WOUND Left 2018     Procedure: CLOSURE, WOUND;  Surgeon: Karla Wheeler DPM;  Location: [facility] OR;  Service: Podiatry;  Laterality: Left;  Secondary Wound closure, extensive    CLOSURE OF WOUND Left 11/5/2018    Procedure: CLOSURE, WOUND;  Surgeon: Karla Wheeler DPM;  Location: [facility] OR;  Service: Podiatry;  Laterality: Left;    DEBRIDEMENT OF MULTIPLE METATARSAL BONES Left 11/5/2018    Procedure: DEBRIDEMENT, METATARSAL BONE, 2 OR MORE BONES;  Surgeon: Karla Wheeler DPM;  Location: [facility] OR;  Service: Podiatry;  Laterality: Left;    EXCISION OF SKIN Left 9/27/2019    Procedure: EXCISION, SKIN;  Surgeon: Lenard Alarcon MD;  Location: [address];  Service: Plastics;  Laterality: Left;  Plastics set, NIMS monitor, ACell    FOOT AMPUTATION THROUGH METATARSAL Left 9/21/2018    Procedure: AMPUTATION, FOOT, TRANSMETATARSAL;  Surgeon: Karla Wheeler DPM;  Location: [facility] OR;  Service: Podiatry;  Laterality: Left;    FOOT AMPUTATION THROUGH METATARSAL Left 10/31/2018    Procedure: AMPUTATION, FOOT, TRANSMETATARSAL;  Surgeon: Karla Wheeler DPM;  Location: [facility] OR;  Service: Podiatry;  Laterality: Left;    FOOT AMPUTATION THROUGH METATARSAL Left 11/5/2018    Procedure: AMPUTATION, FOOT, TRANSMETATARSAL;  Surgeon: Karla Wheeler DPM;  Location: [facility] OR;  Service: Podiatry;  Laterality: Left;  revisional transmetatarsal amputation, Left foot    IRRIGATION AND DEBRIDEMENT OF LOWER EXTREMITY Left 10/31/2018    Procedure: IRRIGATION AND DEBRIDEMENT, LOWER EXTREMITY;  Surgeon: Karla Wheeler DPM;  Location: [facility] OR;  Service: Podiatry;  Laterality: Left;    KIDNEY TRANSPLANT  05/21/2016    LEFT HEART CATHETERIZATION Left 7/21/2019    Procedure: CATHETERIZATION, HEART, LEFT;  Surgeon: Andrew Valdez MD;  Location: [facility] CATH LAB;  Service: Cardiology;  Laterality: Left;    LEG AMPUTATION THROUGH KNEE  2011    right LE, started as nail puncture leading to diabetic ulcer    SKIN FULL THICKNESS GRAFT Left 10/7/2019  "   Procedure: APPLICATION, GRAFT, SKIN, FULL-THICKNESS;  Surgeon: Lenard Alarcon MD;  Location: [address];  Service: Plastics;  Laterality: Left;    SURGICAL REMOVAL OF LESION OF FACE Right 10/7/2019    Procedure: EXCISION, LESION, FACE;  Surgeon: Lenard Alarcon MD;  Location: [address];  Service: Plastics;  Laterality: Right;       Review of patient's allergies indicates:  No Known Allergies    No current facility-administered medications on file prior to encounter.     Current Outpatient Medications on File Prior to Encounter   Medication Sig    amLODIPine (NORVASC) 10 MG tablet Take 1 tablet (10 mg total) by mouth once daily.    aspirin (ECOTRIN) 81 MG EC tablet Take 1 tablet (81 mg total) by mouth once daily.    atorvastatin (LIPITOR) 80 MG tablet Take 1 tablet (80 mg total) by mouth once daily.    BD INSULIN SYRINGE ULTRA-FINE 1 mL 31 gauge x 5/16 Syrg USE ONE AS DIRECTED FOUR TIMES DAILY WITH MEALS AND AT BEDTIME    BD TREY 2ND GEN PEN NEEDLE 32 gauge x 5/32" Ndle USE ONE SUBCUTANEOUSLY FOUR TIMES DAILY     BELBUCA 600 mcg Film     blood sugar diagnostic Strp 1 each by Misc.(Non-Drug; Combo Route) route 3 (three) times daily.    blood-glucose meter kit Use as instructed    blood-glucose meter,continuous (DEXCOM G6 ) Misc Check sugars at least 3 times a day    blood-glucose sensor (DEXCOM G6 SENSOR) [place] Check sugars 3 times a day    cephALEXin (KEFLEX) 500 MG capsule     cloNIDine (CATAPRES) 0.1 MG tablet Take 1 tablet (0.1 mg total) by mouth 3 (three) times daily.    clopidogreL (PLAVIX) 75 mg tablet Take 1 tablet (75 mg total) by mouth once daily.    diclofenac (FLECTOR) 1.3 % PT12 Apply 1 packet topically once daily.    ergocalciferol (ERGOCALCIFEROL) 50,000 unit Cap Take 1 capsule (50,000 Units total) by mouth every 7 days. Take on Mondays    flash glucose scanning reader (FREESTYLE BRYAN 14 DAY READER) Misc 1 Device by Misc.(Non-Drug; Combo Route) route as needed.    flash glucose " sensor (FREESTYLE BRYAN 14 DAY SENSOR) Kit 1 kit by Misc.(Non-Drug; Combo Route) route every 14 (fourteen) days.    FLUAD [id number], 65 YR UP,,PF, 45 mcg (15 mcg x 3)/0.5 mL Syrg     fluorouracil (EFUDEX) 5 % cream Apply topically 2 (two) times daily. For 3 weeks. Will cause redness and irritation.    furosemide (LASIX) 40 MG tablet Take 1 tablet (40 mg total) by mouth daily as needed (Leg swelling, Nocturnal SOB).    gabapentin (NEURONTIN) 300 MG capsule     HYDROcodone-acetaminophen (NORCO)  mg per tablet 1 tablet by Per G Tube route every 6 (six) hours as needed for Pain.    hydrocortisone 2.5 % cream Apply topically 2 (two) times daily.    hydrOXYzine HCL (ATARAX) 25 MG tablet TAKE ONE TABLET BY MOUTH THREE TIMES DAILY AS NEEDED FOR ITCHING     hydrOXYzine pamoate (VISTARIL) 25 MG Cap Take 1 capsule (25 mg total) by mouth every 8 (eight) hours as needed (anxiety).    insulin aspart U-100 (NOVOLOG) 100 unit/mL (3 mL) InPn pen Inject 5 units three times a day with meal if BG > 300.    ipratropium (ATROVENT) 0.02 % nebulizer solution Take 2.5 mLs (500 mcg total) by nebulization 4 (four) times daily.    isosorbide mononitrate (IMDUR) 30 MG 24 hr tablet Take 2 tablets (60 mg total) by mouth once daily.    ketoconazole (NIZORAL) 2 % cream Apply topically 2 (two) times daily.    levalbuterol (XOPENEX) 1.25 mg/3 mL nebulizer solution Take 3 mLs (1.25 mg total) by nebulization every 6 to 8 hours as needed for Wheezing or Shortness of Breath.    linaCLOtide (LINZESS) 72 mcg Cap capsule Take 1 capsule (72 mcg total) by mouth before breakfast.    methylphenidate HCl (RITALIN) 10 MG tablet TAKE ONE TABLET BY MOUTH TWICE A DAY WITH MEALS    metoprolol tartrate (LOPRESSOR) 25 MG tablet Take 1 tablet (25 mg total) by mouth 2 (two) times daily.    mupirocin (BACTROBAN) 2 % ointment Apply topically 3 (three) times daily.    mupirocin (BACTROBAN) 2 % ointment Apply topically once daily. With dressing changes    mupirocin  calcium 2% (BACTROBAN) 2 % cream Apply topically 3 (three) times daily.    mycophenolate (CELLCEPT) 250 mg Cap Take 1 capsule (250 mg total) by mouth 2 (two) times daily.    naloxone (NARCAN) 4 mg/actuation Spry 1 spray by Nasal route once.    nitroGLYCERIN (NITROSTAT) 0.4 MG SL tablet Place 1 tablet (0.4 mg total) under the tongue every 5 (five) minutes as needed.    ondansetron (ZOFRAN) 4 MG tablet Take 1 tablet (4 mg total) by mouth every 6 (six) hours as needed for Nausea.    ondansetron (ZOFRAN-ODT) 4 MG TbDL Take 1 tablet (4 mg total) by mouth every 8 (eight) hours as needed.    oxyCODONE-acetaminophen (PERCOCET)  mg per tablet     predniSONE (DELTASONE) 5 MG tablet TAKE ONE TABLET BY MOUTH ONE TIME DAILY     semaglutide (OZEMPIC) 1 mg/dose (2 mg/1.5 mL) PnIj Inject 1 mg under the skin every 7 days.    senna-docusate 8.6-50 mg (PERICOLACE) 8.6-50 mg per tablet Take 2 tablets by mouth once daily.    sertraline (ZOLOFT) 25 MG tablet Take 1 tablet (25 mg total) by mouth once daily. For depression and anxiety    sevelamer carbonate (RENVELA) 800 mg Tab     tacrolimus (PROGRAF) 0.5 MG Cap Take 2 capsules (1 mg total) by mouth every 12 (twelve) hours.    torsemide (DEMADEX) 20 MG Tab Take 2 tablets (40 mg total) by mouth once daily.    VIOS AEROSOL DELIVERY SYSTEM [place] USE Q 4 H PRN     Family History       Problem Relation (Age of Onset)    Cancer Father    Diabetes Father    Heart failure Father, Mother    Stroke Father          Tobacco Use    Smoking status: Former Smoker     Packs/day: 1.00     Years: 40.00     Pack years: 40.00     Quit date: 2013     Years since quittin.5    Smokeless tobacco: Never Used   Substance and Sexual Activity    Alcohol use: Yes     Alcohol/week: 6.0 standard drinks     Types: 6 Cans of beer per week     Comment: seldom    Drug use: No    Sexual activity: Never     Review of Systems   Constitutional:  Negative for fatigue and fever.   HENT:  Negative for sinus  pressure.    Eyes:  Negative for visual disturbance.   Respiratory:  Negative for shortness of breath.    Cardiovascular:  Negative for chest pain.   Gastrointestinal:  Positive for nausea. Negative for vomiting.   Genitourinary:  Negative for difficulty urinating.   Musculoskeletal:  Positive for gait problem and joint swelling. Negative for back pain.   Skin:  Negative for rash.   Neurological:  Negative for headaches.   Psychiatric/Behavioral:  Negative for confusion.    Objective:     Vital Signs (Most Recent):  Temp: 98.4 °F (36.9 °C) (08/04/22 0406)  Pulse: 84 (08/04/22 0630)  Resp: 18 (08/04/22 0630)  BP: (!) 154/83 (08/04/22 0630)  SpO2: 96 % (08/04/22 0630)   Vital Signs (24h Range):  Temp:  [98.4 °F (36.9 °C)] 98.4 °F (36.9 °C)  Pulse:  [82-88] 84  Resp:  [16-19] 18  SpO2:  [96 %-97 %] 96 %  BP: (154-177)/(77-85) 154/83     Weight: 102.5 kg (226 lb)  Body mass index is 31.52 kg/m².    Physical Exam  Constitutional:       General: He is not in acute distress.     Appearance: He is well-developed. He is obese. He is not diaphoretic.   HENT:      Head: Normocephalic and atraumatic.   Eyes:      Pupils: Pupils are equal, round, and reactive to light.   Cardiovascular:      Rate and Rhythm: Normal rate and regular rhythm.      Heart sounds: Normal heart sounds. No murmur heard.    No friction rub. No gallop.   Pulmonary:      Effort: Pulmonary effort is normal. No respiratory distress.      Breath sounds: Normal breath sounds. No stridor. No wheezing or rales.   Abdominal:      General: Bowel sounds are normal. There is no distension.      Palpations: Abdomen is soft. There is no mass.      Tenderness: There is no abdominal tenderness. There is no guarding.   Musculoskeletal:      Comments: Right bka, left transmetatarsal amputation   Skin:     General: Skin is warm.      Findings: No erythema.   Neurological:      Mental Status: He is alert and oriented to person, place, and time.         CRANIAL NERVES     CN  III, IV, VI   Pupils are equal, round, and reactive to light.     Significant Labs:   Results for orders placed or performed during the hospital encounter of 08/04/22   CBC auto differential   Result Value Ref Range    WBC 5.41 3.90 - 12.70 K/uL    RBC 4.28 (L) 4.60 - 6.20 M/uL    Hemoglobin 13.0 (L) 14.0 - 18.0 g/dL    Hematocrit 42.6 40.0 - 54.0 %     (H) 82 - 98 fL    MCH 30.4 27.0 - 31.0 pg    MCHC 30.5 (L) 32.0 - 36.0 g/dL    RDW 13.5 11.5 - 14.5 %    Platelets 201 150 - 450 K/uL    MPV 9.2 9.2 - 12.9 fL    Immature Granulocytes 0.4 0.0 - 0.5 %    Gran # (ANC) 3.7 1.8 - 7.7 K/uL    Immature Grans (Abs) 0.02 0.00 - 0.04 K/uL    Lymph # 1.0 1.0 - 4.8 K/uL    Mono # 0.6 0.3 - 1.0 K/uL    Eos # 0.0 0.0 - 0.5 K/uL    Baso # 0.02 0.00 - 0.20 K/uL    nRBC 0 0 /100 WBC    Gran % 69.1 38.0 - 73.0 %    Lymph % 18.1 18.0 - 48.0 %    Mono % 11.8 4.0 - 15.0 %    Eosinophil % 0.2 0.0 - 8.0 %    Basophil % 0.4 0.0 - 1.9 %    Differential Method Automated    Comprehensive metabolic panel   Result Value Ref Range    Sodium 136 136 - 145 mmol/L    Potassium 4.9 3.5 - 5.1 mmol/L    Chloride 102 95 - 110 mmol/L    CO2 24 23 - 29 mmol/L    Glucose 250 (H) 70 - 110 mg/dL    BUN 30 (H) 8 - 23 mg/dL    Creatinine 1.8 (H) 0.5 - 1.4 mg/dL    Calcium 8.4 (L) 8.7 - 10.5 mg/dL    Total Protein 6.7 6.0 - 8.4 g/dL    Albumin 3.0 (L) 3.5 - 5.2 g/dL    Total Bilirubin 0.5 0.1 - 1.0 mg/dL    Alkaline Phosphatase 156 (H) 55 - 135 U/L     (H) 10 - 40 U/L    ALT 73 (H) 10 - 44 U/L    Anion Gap 10 8 - 16 mmol/L    eGFR 40 (A) >60 mL/min/1.73 m^2   COVID-19 Rapid Screening   Result Value Ref Range    SARS-CoV-2 RNA, Amplification, Qual Negative Negative     *Note: Due to a large number of results and/or encounters for the requested time period, some results have not been displayed. A complete set of results can be found in Results Review.        Significant Imaging: X-Ray Chest AP Portable  Narrative: EXAMINATION:  XR CHEST AP  PORTABLE    CLINICAL HISTORY:  Encounter for other preprocedural examination    FINDINGS:  Single view of the chest.  08/15/2021 comparison    Cardiac silhouette is normal.  Aorta demonstrates atherosclerotic disease. The lungs demonstrate no evidence of active disease.  Mild bibasilar atelectasis.  No evidence of pleural effusion or pneumothorax.  Bones appear intact demonstrate scattered degenerative change.  Impression: No acute process seen.    Electronically signed by: Ryan Joya MD  Date:    08/04/2022  Time:    07:14  X-Ray Tibia Fibula 2 View Left  Narrative: EXAMINATION:  XR TIBIA FIBULA 2 VIEW LEFT; XR ANKLE COMPLETE 3 VIEW LEFT    CLINICAL HISTORY:  XR TIBIA FIBULA 2 VIEW LEFT; XR ANKLE COMPLETE 3 VIEW LEFTUnspecified fall, initial encounter    COMPARISON:  None    FINDINGS:  Four views of the left tibia/fibula and three views of the left ankle were obtained.    Comminuted tibial metaphyseal fracture with angulation and mild displacement.  There is extension to the medial articular surface and involvement of the interosseous membrane between the tibia and fibula.  Transverse fracture of the distal fibula above the lateral malleolus with mild displacement and angulation.  Mildly comminuted proximal fibular fracture.  Moderate joint effusion of the ankle.  Small avulsion injury at the tip of the medial malleolus    Diffuse osteopenia and moderate soft tissue swelling.  Dense vascular calcifications.  Moderate degenerative changes of the ankle and knee joints.  Advanced degenerative changes within the midfoot.  Large plantar calcaneal spur.  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  Impression: See above.    Electronically signed by: Ryan Joya MD  Date:    08/04/2022  Time:    06:59  X-Ray Ankle Complete Left  Narrative: EXAMINATION:  XR TIBIA FIBULA 2 VIEW LEFT; XR ANKLE COMPLETE 3 VIEW LEFT    CLINICAL HISTORY:  XR TIBIA FIBULA 2 VIEW LEFT; XR ANKLE COMPLETE 3  VIEW LEFTUnspecified fall, initial encounter    COMPARISON:  None    FINDINGS:  Four views of the left tibia/fibula and three views of the left ankle were obtained.    Comminuted tibial metaphyseal fracture with angulation and mild displacement.  There is extension to the medial articular surface and involvement of the interosseous membrane between the tibia and fibula.  Transverse fracture of the distal fibula above the lateral malleolus with mild displacement and angulation.  Mildly comminuted proximal fibular fracture.  Moderate joint effusion of the ankle.  Small avulsion injury at the tip of the medial malleolus    Diffuse osteopenia and moderate soft tissue swelling.  Dense vascular calcifications.  Moderate degenerative changes of the ankle and knee joints.  Advanced degenerative changes within the midfoot.  Large plantar calcaneal spur.  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  Impression: See above.    Electronically signed by: yRan Joya MD  Date:    08/04/2022  Time:    06:59

## 2022-08-04 NOTE — ED PROVIDER NOTES
SCRIBE #1 NOTE: I, Luis Magaña, am scribing for, and in the presence of, Brian Loyola MD. I have scribed the entire note.       History     Chief Complaint   Patient presents with    Fall     LLE pain, redness and swelling, on the floor 4.5 hours, 50mg Ketamine admin per ems     Review of patient's allergies indicates:  No Known Allergies      History of Present Illness     HPI    2022, 5:05 AM  History obtained from the patient      History of Present Illness: Lavelle Ladd is a 67 y.o. male patient with a PMHx of DINORAH, arthritis, CAD, CHF, CRI, ESRD, GERD, HTN, and PVD who presents to the Emergency Department for evaluation of a fall which onset suddenly 5 hours ago. Pt was transferring from his bed to his wheelchair and fell. He states that he heard something break in his left leg. Symptoms are constant and moderate in severity. No mitigating or exacerbating factors reported. Associated sxs include L leg pain. Patient denies any fever, CP, SOB, weakness, numbness, and all other sxs at this time. No further complaints or concerns at this time.       Arrival mode: EMS    PCP: Yahir Calzada MD        Past Medical History:  Past Medical History:   Diagnosis Date    DINORAH (acute kidney injury) 2016    Arthritis     CAD in native artery 2019    CHF (congestive heart failure)     Chronic obstructive pulmonary disease 2016    Coronary artery disease involving native coronary artery of native heart without angina pectoris 2016    CRI (chronic renal insufficiency) 2019     donor kidney transplant for DM 16     Induction with Thymo x3 and IV solumedrol to total 875mg  Kidney Biopsy  2016: 16 glomeruli, ACR type 1 AVR type 2, significant microcirculatory changes, c4d negative, No DSA, 5 to10% fibrosis. Treated with thymo x8 2016- no rejection      Diastolic heart failure     Encounter for blood transfusion     ESRD on RRT since 10/2013 10/29/2013     Biopsy proven diabetic nephropathy and lymphoplasmacytic interstitial infiltrate not c/w with AIN (ddx sjogrens or assoc with tamm-horsefall protein extravasation)     GERD (gastroesophageal reflux disease)     History of hepatitis C, s/p successful Rx w/ SVR12 - 4/2017 4/5/2017    Completed 12 weeks harvoni w/ SVR    Hyperlipidemia     Hypertension     PAD (peripheral artery disease) 7/21/2019    PIC line (peripherally inserted central catheter) flush     Prophylactic immunotherapy     Proteinuria     PVD (peripheral vascular disease) 6/26/2017    RLE BKA CT 12/11/16 Extensive atherosclerotic disease of the aorta and mesenteric arteries.     Renal hypertension     Type 2 diabetes mellitus with diabetic neuropathy, with long-term current use of insulin 12/1/2016    Vitamin B12 deficiency        Past Surgical History:  Past Surgical History:   Procedure Laterality Date    AORTOGRAPHY WITH SERIALOGRAPHY N/A 6/14/2018    Procedure: LEFT LEG ANGIOGRAM;  Surgeon: Donal Mcdonald MD;  Location: [facility] CATH LAB;  Service: Vascular;  Laterality: N/A;    av bovine graft      Left UE    AV FISTULA PLACEMENT      left UE    CARDIAC CATHETERIZATION  02/2015    CLOSURE OF WOUND Left 9/24/2018    Procedure: CLOSURE, WOUND;  Surgeon: Karla Wheeler DPM;  Location: [facility] OR;  Service: Podiatry;  Laterality: Left;  Secondary Wound closure, extensive    CLOSURE OF WOUND Left 11/5/2018    Procedure: CLOSURE, WOUND;  Surgeon: Karla Wheeler DPM;  Location: [facility] OR;  Service: Podiatry;  Laterality: Left;    DEBRIDEMENT OF MULTIPLE METATARSAL BONES Left 11/5/2018    Procedure: DEBRIDEMENT, METATARSAL BONE, 2 OR MORE BONES;  Surgeon: Karla Wheeler DPM;  Location: [facility] OR;  Service: Podiatry;  Laterality: Left;    EXCISION OF SKIN Left 9/27/2019    Procedure: EXCISION, SKIN;  Surgeon: Lenard Alarcon MD;  [address]  Service: Plastics;  Laterality: Left;  Plastics set, NIMS monitor, [facility]     FOOT AMPUTATION THROUGH METATARSAL Left 9/21/2018    Procedure: AMPUTATION, FOOT, TRANSMETATARSAL;  Surgeon: Karla Wheeler DPM;  Location: [facility] OR;  Service: Podiatry;  Laterality: Left;    FOOT AMPUTATION THROUGH METATARSAL Left 10/31/2018    Procedure: AMPUTATION, FOOT, TRANSMETATARSAL;  Surgeon: Karla Wheeler DPM;  Location: [facility] OR;  Service: Podiatry;  Laterality: Left;    FOOT AMPUTATION THROUGH METATARSAL Left 11/5/2018    Procedure: AMPUTATION, FOOT, TRANSMETATARSAL;  Surgeon: Karla Wheeler DPM;  Location: [facility] OR;  Service: Podiatry;  Laterality: Left;  revisional transmetatarsal amputation, Left foot    IRRIGATION AND DEBRIDEMENT OF LOWER EXTREMITY Left 10/31/2018    Procedure: IRRIGATION AND DEBRIDEMENT, LOWER EXTREMITY;  Surgeon: Karla Wheeler DPM;  Location: [facility] OR;  Service: Podiatry;  Laterality: Left;    KIDNEY TRANSPLANT  05/21/2016    LEFT HEART CATHETERIZATION Left 7/21/2019    Procedure: CATHETERIZATION, HEART, LEFT;  Surgeon: Andrew Valdez MD;  Location: [facility] CATH LAB;  Service: Cardiology;  Laterality: Left;    LEG AMPUTATION THROUGH KNEE  2011    right LE, started as nail puncture leading to diabetic ulcer    SKIN FULL THICKNESS GRAFT Left 10/7/2019    Procedure: APPLICATION, GRAFT, SKIN, FULL-THICKNESS;  Surgeon: Lenard Alarcon MD;  Location: [address];  Service: Plastics;  Laterality: Left;    SURGICAL REMOVAL OF LESION OF FACE Right 10/7/2019    Procedure: EXCISION, LESION, FACE;  Surgeon: Lenard Alarcon MD;  Location: [address];  Service: Plastics;  Laterality: Right;         Family History:  Family History   Problem Relation Age of Onset    Cancer Father     Diabetes Father     Heart failure Father     Stroke Father     Heart failure Mother     Kidney disease Neg Hx        Social History:  Social History     Tobacco Use    Smoking status: Former Smoker     Packs/day: 1.00     Years: 40.00     Pack years: 40.00     Quit date:  2013     Years since quittin.5    Smokeless tobacco: Never Used   Substance and Sexual Activity    Alcohol use: Yes     Alcohol/week: 6.0 standard drinks     Types: 6 Cans of beer per week     Comment: seldom    Drug use: No    Sexual activity: Never        Review of Systems     Review of Systems   Constitutional: Negative for fever.   HENT: Negative for sore throat.    Respiratory: Negative for shortness of breath.    Cardiovascular: Negative for chest pain.   Gastrointestinal: Negative for nausea.   Genitourinary: Negative for dysuria.   Musculoskeletal: Positive for myalgias (L leg). Negative for back pain.   Skin: Negative for rash.   Neurological: Negative for weakness and numbness.   Hematological: Does not bruise/bleed easily.   All other systems reviewed and are negative.       Physical Exam     Initial Vitals [22 0406]   BP Pulse Resp Temp SpO2   (!) 155/77 88 16 98.4 °F (36.9 °C) 97 %      MAP       --          Physical Exam  Nursing Notes and Vital Signs Reviewed.  Constitutional: Patient is in moderate distress.   Head: Atraumatic. Normocephalic.  Eyes: EOM intact. Conjunctivae are not pale. No scleral icterus.  ENT: Mucous membranes are moist. Oropharynx is clear and symmetric.    Neck: Supple. Full ROM.   Cardiovascular: Regular rate. Regular rhythm. No murmurs, rubs, or gallops. Distal pulses are 2+ and symmetric.  Pulmonary/Chest: No respiratory distress. Clear to auscultation bilaterally. No wheezing or rales.  Abdominal: Soft and non-distended.  There is no tenderness.  No rebound, guarding, or rigidity.   Musculoskeletal: Moves all extremities. RLE below knee amputation noted. Left foot transmetatarsal amputation noted. Crepitous and tenderness of distal L tibia and ankle with significant instability.   Skin: Chronic vasculopathic skin changes to LLE.   Neurological:  Alert, awake, and appropriate.  Normal speech.  No acute focal neurological deficits are  "appreciated.  Psychiatric: Normal affect. Good eye contact. Appropriate in content.     ED Course   Splint Application    Date/Time: 2022 6:01 AM  Performed by: Brian Loyola MD  Authorized by: Brian Loyola MD   Consent Done: Yes  Consent: Verbal consent obtained.  Risks and benefits: risks, benefits and alternatives were discussed  Consent given by: patient  Patient understanding: patient states understanding of the procedure being performed  Patient consent: the patient's understanding of the procedure matches consent given  Procedure consent: procedure consent matches procedure scheduled  Imaging studies: imaging studies available  Required items: required blood products, implants, devices, and special equipment available  Patient identity confirmed: , MRN and name  Time out: Immediately prior to procedure a "time out" was called to verify the correct patient, procedure, equipment, support staff and site/side marked as required.  Location details: left leg  Splint type: short leg  Post-procedure: The splinted body part was neurovascularly unchanged following the procedure.  Patient tolerance: Patient tolerated the procedure well with no immediate complications        ED Vital Signs:  Vitals:    22 0933 22 1006 22 1600 22 1605   BP: (!) 142/62 (!) 186/76 (!) 145/64 132/62   Pulse: 85 86 77 77   Resp:   14 13   Temp:   97.9 °F (36.6 °C)    TempSrc:   Temporal    SpO2: 99% 100% 95% (!) 94%   Weight:       Height:        22 1610 22 1615 22 1625 22 1630   BP: (!) 131/56 135/74  (!) 184/77   Pulse: 75 75  74   Resp: 12  18 12   Temp:       TempSrc:       SpO2: 95% 96%  95%   Weight:       Height:        22 1635 22 1645 22 1655 22 1716   BP:  (!) 177/77 (!) 171/73 (!) 187/81   Pulse:  78 77 79   Resp: 17 18 14 14   Temp:   97.9 °F (36.6 °C) 98.8 °F (37.1 °C)   TempSrc:   Temporal Oral   SpO2:  95% 95% (!) 94%   Weight:     "   Height:        08/04/22 1746 08/04/22 1800 08/04/22 1957   BP:  (!) 124/56 (!) 156/67   Pulse:   76   Resp: 16  18   Temp:   98.3 °F (36.8 °C)   TempSrc:   Oral   SpO2:   (!) 94%   Weight:      Height:              Imaging Results:  Imaging Results          X-Ray Chest AP Portable (Final result)  Result time 08/04/22 07:14:40    Final result by Ryan Joya MD (08/04/22 07:14:40)                 Impression:      No acute process seen.      Electronically signed by: Ryan Joya MD  Date:    08/04/2022  Time:    07:14             Narrative:    EXAMINATION:  XR CHEST AP PORTABLE    CLINICAL HISTORY:  Encounter for other preprocedural examination    FINDINGS:  Single view of the chest.  08/15/2021 comparison    Cardiac silhouette is normal.  Aorta demonstrates atherosclerotic disease. The lungs demonstrate no evidence of active disease.  Mild bibasilar atelectasis.  No evidence of pleural effusion or pneumothorax.  Bones appear intact demonstrate scattered degenerative change.                               X-Ray Tibia Fibula 2 View Left (Final result)  Result time 08/04/22 06:59:10    Final result by Ryan Joya MD (08/04/22 06:59:10)                 Impression:      See above.      Electronically signed by: Ryan Jyoa MD  Date:    08/04/2022  Time:    06:59             Narrative:    EXAMINATION:  XR TIBIA FIBULA 2 VIEW LEFT; XR ANKLE COMPLETE 3 VIEW LEFT    CLINICAL HISTORY:  XR TIBIA FIBULA 2 VIEW LEFT; XR ANKLE COMPLETE 3 VIEW LEFTUnspecified fall, initial encounter    COMPARISON:  None    FINDINGS:  Four views of the left tibia/fibula and three views of the left ankle were obtained.    Comminuted tibial metaphyseal fracture with angulation and mild displacement.  There is extension to the medial articular surface and involvement of the interosseous membrane between the tibia and fibula.  Transverse fracture of the distal fibula above the lateral malleolus with mild displacement and angulation.   Mildly comminuted proximal fibular fracture.  Moderate joint effusion of the ankle.  Small avulsion injury at the tip of the medial malleolus    Diffuse osteopenia and moderate soft tissue swelling.  Dense vascular calcifications.  Moderate degenerative changes of the ankle and knee joints.  Advanced degenerative changes within the midfoot.  Large plantar calcaneal spur.  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                               X-Ray Ankle Complete Left (Final result)  Result time 08/04/22 06:59:10    Final result by Ryan Joya MD (08/04/22 06:59:10)                 Impression:      See above.      Electronically signed by: Ryan Joya MD  Date:    08/04/2022  Time:    06:59             Narrative:    EXAMINATION:  XR TIBIA FIBULA 2 VIEW LEFT; XR ANKLE COMPLETE 3 VIEW LEFT    CLINICAL HISTORY:  XR TIBIA FIBULA 2 VIEW LEFT; XR ANKLE COMPLETE 3 VIEW LEFTUnspecified fall, initial encounter    COMPARISON:  None    FINDINGS:  Four views of the left tibia/fibula and three views of the left ankle were obtained.    Comminuted tibial metaphyseal fracture with angulation and mild displacement.  There is extension to the medial articular surface and involvement of the interosseous membrane between the tibia and fibula.  Transverse fracture of the distal fibula above the lateral malleolus with mild displacement and angulation.  Mildly comminuted proximal fibular fracture.  Moderate joint effusion of the ankle.  Small avulsion injury at the tip of the medial malleolus    Diffuse osteopenia and moderate soft tissue swelling.  Dense vascular calcifications.  Moderate degenerative changes of the ankle and knee joints.  Advanced degenerative changes within the midfoot.  Large plantar calcaneal spur.  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                               5:33 AM: Per ED physician, pt's XR Tibia Fibula 2 View Left  results: Comminuted fractures of the distal tibia and fibula with displacement.                 The Emergency Provider reviewed the vital signs and test results, which are outlined above.     ED Discussion     5:55 AM: Discussed pt's case with Dr. Villa (Orthopedic Surgery) who recommends placing in a temporary splint and admit to  for medical clearance. Plan for likely ex-fix surgery later today.     6:00 AM: Discussed case with Harshad Brown MD ([facility] Medicine). Dr. Balbuena agrees with current care and management of pt and accepts admission.   Admitting Service: [facility]  Admitting Physician: Dr. Balbuena  Admit to: Inpatient Medsur    6:00 AM: Re-evaluated pt. I have discussed test results, shared treatment plan, and the need for admission with patient and family at bedside. Pt and family express understanding at this time and agree with all information. All questions answered. Pt and family have no further questions or concerns at this time. Pt is ready for admit.         Medical Decision Making:   Clinical Tests:   Radiological Study: Ordered and Reviewed           ED Medication(s):  Medications   guaiFENesin 100 mg/5 ml syrup 200 mg (has no administration in time range)   aluminum-magnesium hydroxide-simethicone 200-200-20 mg/5 mL suspension 30 mL (has no administration in time range)   albuterol-ipratropium 2.5 mg-0.5 mg/3 mL nebulizer solution 3 mL (has no administration in time range)   hydrALAZINE injection 10 mg (10 mg Intravenous Given 8/4/22 1746)   sertraline tablet 25 mg (25 mg Oral Given 8/4/22 1041)   tacrolimus capsule 1 mg (1 mg Oral Given 8/4/22 1746)   metoprolol tartrate (LOPRESSOR) tablet 25 mg (25 mg Oral Given 8/4/22 1041)   atorvastatin tablet 80 mg (80 mg Oral Given 8/4/22 1042)   glucagon (human recombinant) injection 1 mg (has no administration in time range)   dextrose 10% bolus 125 mL (has no administration in time range)   dextrose 10% bolus 250 mL (has no administration in time  range)   insulin aspart U-100 pen 1-10 Units (6 Units Subcutaneous Given 8/4/22 1151)   0.9%  NaCl infusion ( Intravenous Verify Only 8/4/22 1831)   sodium chloride 0.9% flush 10 mL (has no administration in time range)   sodium chloride 0.9% flush 10 mL (has no administration in time range)   scopolamine 1.3-1.5 mg (1 mg over 3 days) 1 patch (1 patch Transdermal Patch Applied 8/4/22 1412)   sodium chloride 0.9% flush 10 mL (has no administration in time range)   chlorhexidine 0.12 % solution 10 mL (has no administration in time range)   apixaban tablet 2.5 mg (has no administration in time range)   acetaminophen tablet 650 mg (has no administration in time range)   morphine injection 2 mg (has no administration in time range)   cefazolin (ANCEF) 2 gram in dextrose 5% 50 mL IVPB (premix) (has no administration in time range)   ondansetron disintegrating tablet 8 mg (has no administration in time range)   HYDROcodone-acetaminophen 5-325 mg per tablet 2 tablet (2 tablets Oral Given 8/4/22 1746)   morphine injection 4 mg (4 mg Intravenous Given 8/4/22 0606)   ondansetron injection 4 mg (4 mg Intravenous Given 8/4/22 0606)   meperidine (PF) injection 12.5 mg (12.5 mg Intravenous Given 8/4/22 1625)       Current Discharge Medication List                  Scribe Attestation:   Scribe #1: I performed the above scribed service and the documentation accurately describes the services I performed. I attest to the accuracy of the note.     Attending:   Physician Attestation Statement for Scribe #1: I, Brian Loyola MD, personally performed the services described in this documentation, as scribed by Luis Magaña, in my presence, and it is both accurate and complete.           Clinical Impression       ICD-10-CM ICD-9-CM   1. Closed fracture of left tibia and fibula, initial encounter  S82.202A 823.82    S82.402A    2. Fall  W19.XXXA E888.9   3. Fracture tibia/fibula, left, closed, initial encounter  S82.202A 823.82     S82.402A    4. Preop examination  Z01.818 V72.84   5. Coronary artery disease  I25.10 414.00   6. Closed fracture of left tibia and fibula with routine healing, subsequent encounter  S82.202D V54.16    S82.402D        Disposition:   Disposition: Admitted  Condition: Stable         Brian Loyola MD  08/04/22 5621

## 2022-08-04 NOTE — OP NOTE
[facility] - Surgery ([facility])  Surgery Department  Operative Note    SUMMARY     Date of Procedure: 8/4/2022     Procedure:  1. Closed reduction left tibial and fibular shaft fractures  2. Application of external fixation left tibia     Surgeon(s) and Role:     * Good Villa MD - Primary    Assisting Surgeon: Tam Mcdermott PA-C    Pre-Operative Diagnosis:   Fracture tibia/fibula, left, closed    Post-Operative Diagnosis:   Fracture tibia/fibula, left, closed    Anesthesia: Monitor Anesthesia Care    Operative Findings (including complications, if any):  Closed comminuted fracture distal tibial shaft, closed transverse fracture of the distal fibular shaft, multiple superficial skin lesion/abrasions in various stages of healing without infection    Description of Technical Procedures:   The patient was brought to the operating room and after administration of adequate intravenous anesthesia the left lower extremity splint was removed.  The patient is found to have previous transmetatarsal amputation.  He has the unstable fractures of the distal tibial and fibular shafts.  Multiple areas of punctate abrasions along with a 4 x 2 cm abrasion over the medial distal leg above the medial malleolus.  The limb was then prepped and draped in usual fashion for exposure from foot to knee region.  C-arm image intensifier was used to further evaluate the fractures and reduction with traction and manipulation.  External fixation was then applied.  Two small stab incisions were made over the midshaft tibia and Shantz pins were then placed her under image intensifier control with good purchase in bone.  The calcaneal transfixing pin was then placed from medial to lateral also with image intensifier control.  The external fixation device was then applied with medial and frame and the kickstand attachment.  The fractures were then reduced in AP and lateral projections with significant improvement obtained by traction and manipulation.   Satisfactory alignment and position was obtained in AP and lateral projections.  The tibia is markedly comminuted and perhaps extends to the joint.  Sterile dressings were then applied and the patient was brought to recovery in stable condition    Significant Surgical Tasks Conducted by the Assistant(s), if Applicable:  Surgical assistant was required for placement of external fixation device, assistance in repair manipulation and reduction of the fractures    Estimated Blood Loss (EBL): nil           Implants:  External fixation with 2 pins in the tibia and 1 in the calcaneus    Specimens: none            Condition: Stable    Disposition: PACU - guarded condition.    Attestation: I performed the procedure.

## 2022-08-04 NOTE — OP NOTE
Certification of Assistant at Surgery       Surgery Date: 8/4/2022     Participating Surgeons:  Surgeon(s) and Role:     * Good Villa MD - Primary    Procedures:  Procedure(s) (LRB):  APPLICATION, EXTERNAL FIXATION DEVICE, LARGE, TIBIA (Left)    Assistant Surgeon's Certification of Necessity:  I understand that section 1842 (b) (6) (d) of the Social Security Act generally prohibits Medicare Part B reasonable charge payment for the services of assistants at surgery in teaching hospitals when qualified residents are available to furnish such services. I certify that the services for which payment is claimed were medically necessary, and that no qualified resident was available to perform the services. I further understand that these services are subject to post-payment review by the Medicare carrier.      Tam Mcdermott PA-C    08/04/2022  3:56 PM

## 2022-08-04 NOTE — ANESTHESIA POSTPROCEDURE EVALUATION
Anesthesia Post Evaluation    Patient: Lavelle Ladd    Procedure(s) Performed: Procedure(s) (LRB):  APPLICATION, EXTERNAL FIXATION DEVICE, LARGE, TIBIA (Left)    Final Anesthesia Type: MAC      Patient location during evaluation: PACU  Patient participation: Yes- Able to Participate  Level of consciousness: awake  Post-procedure vital signs: reviewed and stable  Pain management: adequate  Airway patency: patent    PONV status at discharge: No PONV  Anesthetic complications: no      Cardiovascular status: hemodynamically stable  Respiratory status: unassisted  Hydration status: euvolemic  Follow-up not needed.          Vitals Value Taken Time   /74 08/04/22 1616   Temp  08/04/22 1617   Pulse 77 08/04/22 1616   Resp 19 08/04/22 1616   SpO2 96 % 08/04/22 1616   Vitals shown include unvalidated device data.      No case tracking events are documented in the log.      Pain/Shereen Score: Pain Rating Prior to Med Admin: 10 (8/4/2022  8:48 AM)

## 2022-08-04 NOTE — HPI
Patient is a 67 y.o.  male with a PMHx of HTN, CAD, DM, PVD, kidney transplant, COPD, right BKA, left transmetatarsal amputation who presents to the Emergency Department for evaluation of a fall which onset suddenly 5 hours ago. Patient states he was trying to get into his wheel chair when he slipped and fell. He reported not being able to put weight on it and laid on the ground for 4-5 hours before calling ems. Patient denied any issues with anesthesia with prior surgeries. Currently complains of pain and nausea. Otherwise denies any other issues.     In the ED, ankle xray showed distal tib fib fracture. Ortho notified and plans to operate later this afternoon. Patient was admitted to .

## 2022-08-04 NOTE — H&P
[facility] - Emergency Dept.  Orthopedics  H&P    Patient Name: Lavelle Ladd  MRN: [id number]  Admission Date: 2022  Primary Care Provider: Yahir Calzada MD    Patient information was obtained from patient and ER records.     Subjective:     Principal Problem:Closed fracture of left tibia and fibula    Chief Complaint:   Chief Complaint   Patient presents with    Fall     LLE pain, redness and swelling, on the floor 4.5 hours, 50mg Ketamine admin per ems        HPI: Lavelle Ladd is a 67-year-old male with past medical history significant for DINORAH, arthritis, CAD, CHF, CRI, ESRD, HTN, kidney transplant, and PVD who was in the emergency department following a fall while transferring from bed to wheelchair.  Patient in says that he heard something break in his left leg.  Of note, the patient has BKA on the right side and left transmetatarsal amputation.  Patient takes Plavix and aspirin daily, last dose was sometime yesterday per the patient, but he is unsure of what time    Past Medical History:   Diagnosis Date    DINORAH (acute kidney injury) 2016    Arthritis     CAD in native artery 2019    CHF (congestive heart failure)     Chronic obstructive pulmonary disease 2016    Coronary artery disease involving native coronary artery of native heart without angina pectoris 2016    CRI (chronic renal insufficiency) 2019     donor kidney transplant for DM 16     Induction with Thymo x3 and IV solumedrol to total 875mg  Kidney Biopsy  2016: 16 glomeruli, ACR type 1 AVR type 2, significant microcirculatory changes, c4d negative, No DSA, 5 to10% fibrosis. Treated with thymo x8 2016- no rejection      Diastolic heart failure     Encounter for blood transfusion     ESRD on RRT since 10/2013 10/29/2013    Biopsy proven diabetic nephropathy and lymphoplasmacytic interstitial infiltrate not c/w with AIN (ddx sjogrens or assoc with tamm-horsefall protein extravasation)      GERD (gastroesophageal reflux disease)     History of hepatitis C, s/p successful Rx w/ SVR12 - 4/2017 4/5/2017    Completed 12 weeks harvoni w/ SVR    Hyperlipidemia     Hypertension     PAD (peripheral artery disease) 7/21/2019    PIC line (peripherally inserted central catheter) flush     Prophylactic immunotherapy     Proteinuria     PVD (peripheral vascular disease) 6/26/2017    RLE BKA CT 12/11/16 Extensive atherosclerotic disease of the aorta and mesenteric arteries.     Renal hypertension     Type 2 diabetes mellitus with diabetic neuropathy, with long-term current use of insulin 12/1/2016    Vitamin B12 deficiency        Past Surgical History:   Procedure Laterality Date    AORTOGRAPHY WITH SERIALOGRAPHY N/A 6/14/2018    Procedure: LEFT LEG ANGIOGRAM;  Surgeon: Donal Mcdonald MD;  Location: [facility] CATH LAB;  Service: Vascular;  Laterality: N/A;    av bovine graft      Left UE    AV FISTULA PLACEMENT      left UE    CARDIAC CATHETERIZATION  02/2015    CLOSURE OF WOUND Left 9/24/2018    Procedure: CLOSURE, WOUND;  Surgeon: Karla Wheeler DPM;  Location: [facility] OR;  Service: Podiatry;  Laterality: Left;  Secondary Wound closure, extensive    CLOSURE OF WOUND Left 11/5/2018    Procedure: CLOSURE, WOUND;  Surgeon: Karla Wheeler DPM;  Location: [facility] OR;  Service: Podiatry;  Laterality: Left;    DEBRIDEMENT OF MULTIPLE METATARSAL BONES Left 11/5/2018    Procedure: DEBRIDEMENT, METATARSAL BONE, 2 OR MORE BONES;  Surgeon: Karla Wheeler DPM;  Location: [facility] OR;  Service: Podiatry;  Laterality: Left;    EXCISION OF SKIN Left 9/27/2019    Procedure: EXCISION, SKIN;  Surgeon: Lenard Alarcon MD;  Location: [address];  Service: Plastics;  Laterality: Left;  Plastics set, NIMS monitor, [employer]    FOOT AMPUTATION THROUGH METATARSAL Left 9/21/2018    Procedure: AMPUTATION, FOOT, TRANSMETATARSAL;  Surgeon: Karla Wheeler DPM;  Location: [facility] OR;  Service: Podiatry;  Laterality: Left;     FOOT AMPUTATION THROUGH METATARSAL Left 10/31/2018    Procedure: AMPUTATION, FOOT, TRANSMETATARSAL;  Surgeon: Karla Wheeler DPM;  Location: [facility] OR;  Service: Podiatry;  Laterality: Left;    FOOT AMPUTATION THROUGH METATARSAL Left 11/5/2018    Procedure: AMPUTATION, FOOT, TRANSMETATARSAL;  Surgeon: Karla Wheeler DPM;  Location: [facility] OR;  Service: Podiatry;  Laterality: Left;  revisional transmetatarsal amputation, Left foot    IRRIGATION AND DEBRIDEMENT OF LOWER EXTREMITY Left 10/31/2018    Procedure: IRRIGATION AND DEBRIDEMENT, LOWER EXTREMITY;  Surgeon: Karla Wheeler DPM;  Location: [facility] OR;  Service: Podiatry;  Laterality: Left;    KIDNEY TRANSPLANT  05/21/2016    LEFT HEART CATHETERIZATION Left 7/21/2019    Procedure: CATHETERIZATION, HEART, LEFT;  Surgeon: Andrew Valdez MD;  Location: [facility] CATH LAB;  Service: Cardiology;  Laterality: Left;    LEG AMPUTATION THROUGH KNEE  2011    right LE, started as nail puncture leading to diabetic ulcer    SKIN FULL THICKNESS GRAFT Left 10/7/2019    Procedure: APPLICATION, GRAFT, SKIN, FULL-THICKNESS;  Surgeon: Lenard Alarcon MD;  [facility];  Service: Plastics;  Laterality: Left;    SURGICAL REMOVAL OF LESION OF FACE Right 10/7/2019    Procedure: EXCISION, LESION, FACE;  Surgeon: Lenard Alarcon MD;  [facility];  Service: Plastics;  Laterality: Right;       Review of patient's allergies indicates:  No Known Allergies    Current Facility-Administered Medications   Medication    0.9%  NaCl infusion    acetaminophen tablet 650 mg    albuterol-ipratropium 2.5 mg-0.5 mg/3 mL nebulizer solution 3 mL    aluminum-magnesium hydroxide-simethicone 200-200-20 mg/5 mL suspension 30 mL    atorvastatin tablet 80 mg    dextrose 10% bolus 125 mL    dextrose 10% bolus 250 mL    glucagon (human recombinant) injection 1 mg    guaiFENesin 100 mg/5 ml syrup 200 mg    hydrALAZINE injection 10 mg    insulin aspart U-100 pen  "1-10 Units    metoprolol tartrate (LOPRESSOR) tablet 25 mg    morphine injection 2 mg    morphine injection 4 mg    ondansetron injection 4 mg    sertraline tablet 25 mg    tacrolimus capsule 1 mg     Current Outpatient Medications   Medication Sig    amLODIPine (NORVASC) 10 MG tablet Take 1 tablet (10 mg total) by mouth once daily.    aspirin (ECOTRIN) 81 MG EC tablet Take 1 tablet (81 mg total) by mouth once daily.    atorvastatin (LIPITOR) 80 MG tablet Take 1 tablet (80 mg total) by mouth once daily.    BD INSULIN SYRINGE ULTRA-FINE 1 mL 31 gauge x 5/16 Syrg USE ONE AS DIRECTED FOUR TIMES DAILY WITH MEALS AND AT BEDTIME    BD TREY 2ND GEN PEN NEEDLE 32 gauge x 5/32" Ndle USE ONE SUBCUTANEOUSLY FOUR TIMES DAILY     BELBUCA 600 mcg Film     blood sugar diagnostic Strp 1 each by Misc.(Non-Drug; Combo Route) route 3 (three) times daily.    blood-glucose meter kit Use as instructed    blood-glucose meter,continuous (DEXCOM G6 ) Misc Check sugars at least 3 times a day    blood-glucose sensor (DEXCOM G6 SENSOR) [place]ana Check sugars 3 times a day    cephALEXin (KEFLEX) 500 MG capsule     cloNIDine (CATAPRES) 0.1 MG tablet Take 1 tablet (0.1 mg total) by mouth 3 (three) times daily.    clopidogreL (PLAVIX) 75 mg tablet Take 1 tablet (75 mg total) by mouth once daily.    diclofenac (FLECTOR) 1.3 % PT12 Apply 1 packet topically once daily.    ergocalciferol (ERGOCALCIFEROL) 50,000 unit Cap Take 1 capsule (50,000 Units total) by mouth every 7 days. Take on Mondays    flash glucose scanning reader (FREESTYLE BRYAN 14 DAY READER) Misc 1 Device by Misc.(Non-Drug; Combo Route) route as needed.    flash glucose sensor (FREESTYLE BRYAN 14 DAY SENSOR) Kit 1 kit by Misc.(Non-Drug; Combo Route) route every 14 (fourteen) days.    FLUAD 4506-2701, 65 YR UP,,PF, 45 mcg (15 mcg x 3)/0.5 mL Syrg     fluorouracil (EFUDEX) 5 % cream Apply topically 2 (two) times daily. For 3 weeks. Will cause redness and " irritation.    furosemide (LASIX) 40 MG tablet Take 1 tablet (40 mg total) by mouth daily as needed (Leg swelling, Nocturnal SOB).    gabapentin (NEURONTIN) 300 MG capsule     HYDROcodone-acetaminophen (NORCO)  mg per tablet 1 tablet by Per G Tube route every 6 (six) hours as needed for Pain.    hydrocortisone 2.5 % cream Apply topically 2 (two) times daily.    hydrOXYzine HCL (ATARAX) 25 MG tablet TAKE ONE TABLET BY MOUTH THREE TIMES DAILY AS NEEDED FOR ITCHING     hydrOXYzine pamoate (VISTARIL) 25 MG Cap Take 1 capsule (25 mg total) by mouth every 8 (eight) hours as needed (anxiety).    insulin aspart U-100 (NOVOLOG) 100 unit/mL (3 mL) InPn pen Inject 5 units three times a day with meal if BG > 300.    ipratropium (ATROVENT) 0.02 % nebulizer solution Take 2.5 mLs (500 mcg total) by nebulization 4 (four) times daily.    isosorbide mononitrate (IMDUR) 30 MG 24 hr tablet Take 2 tablets (60 mg total) by mouth once daily.    ketoconazole (NIZORAL) 2 % cream Apply topically 2 (two) times daily.    levalbuterol (XOPENEX) 1.25 mg/3 mL nebulizer solution Take 3 mLs (1.25 mg total) by nebulization every 6 to 8 hours as needed for Wheezing or Shortness of Breath.    linaCLOtide (LINZESS) 72 mcg Cap capsule Take 1 capsule (72 mcg total) by mouth before breakfast.    methylphenidate HCl (RITALIN) 10 MG tablet TAKE ONE TABLET BY MOUTH TWICE A DAY WITH MEALS    metoprolol tartrate (LOPRESSOR) 25 MG tablet Take 1 tablet (25 mg total) by mouth 2 (two) times daily.    mupirocin (BACTROBAN) 2 % ointment Apply topically 3 (three) times daily.    mupirocin (BACTROBAN) 2 % ointment Apply topically once daily. With dressing changes    mupirocin calcium 2% (BACTROBAN) 2 % cream Apply topically 3 (three) times daily.    mycophenolate (CELLCEPT) 250 mg Cap Take 1 capsule (250 mg total) by mouth 2 (two) times daily.    naloxone (NARCAN) 4 mg/actuation Spry 1 spray by Nasal route once.    nitroGLYCERIN (NITROSTAT)  0.4 MG SL tablet Place 1 tablet (0.4 mg total) under the tongue every 5 (five) minutes as needed.    ondansetron (ZOFRAN) 4 MG tablet Take 1 tablet (4 mg total) by mouth every 6 (six) hours as needed for Nausea.    ondansetron (ZOFRAN-ODT) 4 MG TbDL Take 1 tablet (4 mg total) by mouth every 8 (eight) hours as needed.    oxyCODONE-acetaminophen (PERCOCET)  mg per tablet     predniSONE (DELTASONE) 5 MG tablet TAKE ONE TABLET BY MOUTH ONE TIME DAILY     semaglutide (OZEMPIC) 1 mg/dose (2 mg/1.5 mL) PnIj Inject 1 mg under the skin every 7 days.    senna-docusate 8.6-50 mg (PERICOLACE) 8.6-50 mg per tablet Take 2 tablets by mouth once daily.    sertraline (ZOLOFT) 25 MG tablet Take 1 tablet (25 mg total) by mouth once daily. For depression and anxiety    sevelamer carbonate (RENVELA) 800 mg Tab     tacrolimus (PROGRAF) 0.5 MG Cap Take 2 capsules (1 mg total) by mouth every 12 (twelve) hours.    torsemide (DEMADEX) 20 MG Tab Take 2 tablets (40 mg total) by mouth once daily.    VIOS AEROSOL DELIVERY SYSTEM [place] USE Q 4 H PRN     Family History     Problem Relation (Age of Onset)    Cancer Father    Diabetes Father    Heart failure Father, Mother    Stroke Father        Tobacco Use    Smoking status: Former Smoker     Packs/day: 1.00     Years: 40.00     Pack years: 40.00     Quit date: 2013     Years since quittin.5    Smokeless tobacco: Never Used   Substance and Sexual Activity    Alcohol use: Yes     Alcohol/week: 6.0 standard drinks     Types: 6 Cans of beer per week     Comment: seldom    Drug use: No    Sexual activity: Never     ROS  Objective:     Vital Signs (Most Recent):  Temp: 98.4 °F (36.9 °C) (22 0406)  Pulse: 84 (22)  Resp: 18 (22)  BP: (!) 154/83 (2230)  SpO2: 96 % (22) Vital Signs (24h Range):  Temp:  [98.4 °F (36.9 °C)] 98.4 °F (36.9 °C)  Pulse:  [82-88] 84  Resp:  [16-19] 18  SpO2:  [96 %-97 %] 96 %  BP: (154-177)/(77-85)  "154/83     Weight: 102.5 kg (226 lb)  Height: 5' 11" (180.3 cm)  Body mass index is 31.52 kg/m².    No intake or output data in the 24 hours ending 08/04/22 0812    Ortho/SPM Exam   Left lower extremity:  Splint is intact and well fitting  Transmetatarsal amputation site is visible and clean  Calf and compartments are soft and compressible  Sensation and pulses intact    GEN: Well developed, well nourished male. AAOX3. No acute distress.   Normocephalic, atraumatic.   [place]  Breathing unlabored.  Mood and affect appropriate.      Significant Labs:   CBC:   Recent Labs   Lab 08/04/22  0642   WBC 5.41   HGB 13.0*   HCT 42.6        CMP:   Recent Labs   Lab 08/04/22  0642      K 4.9      CO2 24   *   BUN 30*   CREATININE 1.8*   CALCIUM 8.4*   PROT 6.7   ALBUMIN 3.0*   BILITOT 0.5   ALKPHOS 156*   *   ALT 73*   ANIONGAP 10     All pertinent labs within the past 24 hours have been reviewed.    Significant Imaging: X-Ray: I have reviewed all pertinent results/findings and my personal findings are:  X-ray left ankle obtained today shows comminuted tibial metaphyseal fracture with angulation and mild displacement, there is articular involvement.  Transverse fracture of the distal fibula with mild displacement and angulation is also noted.  Mildly comminuted proximal fibular fracture.  Small avulsion at the tip of the medial malleolus    Assessment/Plan:     Active Diagnoses:    Diagnosis Date Noted POA    PRINCIPAL PROBLEM:  Closed fracture of left tibia and fibula [S82.202A, S82.402A] 08/04/2022 Yes    Severe central sleep apnea comorbid with prescribed opioid use [F11.90, G47.37] 01/29/2020 Yes    Kidney transplant recipient [Z94.0] 04/07/2017 Not Applicable    Type 2 diabetes mellitus with stable proliferative retinopathy of both eyes, with long-term current use of insulin [E11.3553, Z79.4] 12/01/2016 Not Applicable    Coronary artery disease of native artery of native heart with " stable angina pectoris [I25.118] 07/01/2016 Yes    Osteoarthritis of lumbar spine [M47.816]  Yes    Essential hypertension [I10] 02/20/2015 Yes      Problems Resolved During this Admission:     Assessment:  67-year-old male with left distal tibia/fibula fracture    Plan:  Keep patient NPO  Keep splint intact  Plan to take the patient to the operating room for application of external fixation device    Tam Mcdermott PA-C  Orthopedics  [facility] - Emergency Dept.

## 2022-08-04 NOTE — H&P
[facility] - Emergency Dept.  [facility] Medicine  History & Physical    Patient Name: Lavelle Ladd  MRN: [id number]  Patient Class: IP- Inpatient  Admission Date: 2022  Attending Physician: Roddy Balbuena MD  Primary Care Provider: Yahir Calzada MD         Patient information was obtained from patient and ER records.     Subjective:     Principal Problem:Closed fracture of left tibia and fibula    Chief Complaint:   Chief Complaint   Patient presents with    Fall     LLE pain, redness and swelling, on the floor 4.5 hours, 50mg Ketamine admin per ems        HPI: Patient is a 67 y.o.  male with a PMHx of HTN, CAD, DM, PVD, kidney transplant, COPD, right BKA, left transmetatarsal amputation who presents to the Emergency Department for evaluation of a fall which onset suddenly 5 hours ago. Patient states he was trying to get into his wheel chair when he slipped and fell. He reported not being able to put weight on it and laid on the ground for 4-5 hours before calling ems. Patient denied any issues with anesthesia with prior surgeries. Currently complains of pain and nausea. Otherwise denies any other issues.     In the ED, ankle xray showed distal tib fib fracture. Ortho notified and plans to operate later this afternoon. Patient was admitted to .               Past Medical History:   Diagnosis Date    DINORAH (acute kidney injury) 2016    Arthritis     CAD in native artery 2019    CHF (congestive heart failure)     Chronic obstructive pulmonary disease 2016    Coronary artery disease involving native coronary artery of native heart without angina pectoris 2016    CRI (chronic renal insufficiency) 2019     donor kidney transplant for DM 16     Induction with Thymo x3 and IV solumedrol to total 875mg  Kidney Biopsy  2016: 16 glomeruli, ACR type 1 AVR type 2, significant microcirculatory changes, c4d negative, No DSA, 5 to10% fibrosis. Treated with thymo x8 2016-  no rejection      Diastolic heart failure     Encounter for blood transfusion     ESRD on RRT since 10/2013 10/29/2013    Biopsy proven diabetic nephropathy and lymphoplasmacytic interstitial infiltrate not c/w with AIN (ddx sjogrens or assoc with tamm-horsefall protein extravasation)     GERD (gastroesophageal reflux disease)     History of hepatitis C, s/p successful Rx w/ SVR12 - 4/2017 4/5/2017    Completed 12 weeks harvoni w/ SVR    Hyperlipidemia     Hypertension     PAD (peripheral artery disease) 7/21/2019    PIC line (peripherally inserted central catheter) flush     Prophylactic immunotherapy     Proteinuria     PVD (peripheral vascular disease) 6/26/2017    RLE BKA CT 12/11/16 Extensive atherosclerotic disease of the aorta and mesenteric arteries.     Renal hypertension     Type 2 diabetes mellitus with diabetic neuropathy, with long-term current use of insulin 12/1/2016    Vitamin B12 deficiency        Past Surgical History:   Procedure Laterality Date    AORTOGRAPHY WITH SERIALOGRAPHY N/A 6/14/2018    Procedure: LEFT LEG ANGIOGRAM;  Surgeon: Donal Mcdonald MD;  Location: [facility] CATH LAB;  Service: Vascular;  Laterality: N/A;    av bovine graft      Left UE    AV FISTULA PLACEMENT      left UE    CARDIAC CATHETERIZATION  02/2015    CLOSURE OF WOUND Left 9/24/2018    Procedure: CLOSURE, WOUND;  Surgeon: Karla Wheeler DPM;  Location: [facility] OR;  Service: Podiatry;  Laterality: Left;  Secondary Wound closure, extensive    CLOSURE OF WOUND Left 11/5/2018    Procedure: CLOSURE, WOUND;  Surgeon: Karla Wheeler DPM;  Location: [facility] OR;  Service: Podiatry;  Laterality: Left;    DEBRIDEMENT OF MULTIPLE METATARSAL BONES Left 11/5/2018    Procedure: DEBRIDEMENT, METATARSAL BONE, 2 OR MORE BONES;  Surgeon: Karla Wheeler DPM;  Location: [facility] OR;  Service: Podiatry;  Laterality: Left;    EXCISION OF SKIN Left 9/27/2019    Procedure: EXCISION, SKIN;  Surgeon: Lenard Alarcon MD;   [address]  Service: Plastics;  Laterality: Left;  Plastics set, NIMS monitor, ACell    FOOT AMPUTATION THROUGH METATARSAL Left 9/21/2018    Procedure: AMPUTATION, FOOT, TRANSMETATARSAL;  Surgeon: Karla Wheeler DPM;  Location: [facility] OR;  Service: Podiatry;  Laterality: Left;    FOOT AMPUTATION THROUGH METATARSAL Left 10/31/2018    Procedure: AMPUTATION, FOOT, TRANSMETATARSAL;  Surgeon: Karla Wheeler DPM;  Location: [facility] OR;  Service: Podiatry;  Laterality: Left;    FOOT AMPUTATION THROUGH METATARSAL Left 11/5/2018    Procedure: AMPUTATION, FOOT, TRANSMETATARSAL;  Surgeon: Karla Wheeler DPM;  Location: [facility] OR;  Service: Podiatry;  Laterality: Left;  revisional transmetatarsal amputation, Left foot    IRRIGATION AND DEBRIDEMENT OF LOWER EXTREMITY Left 10/31/2018    Procedure: IRRIGATION AND DEBRIDEMENT, LOWER EXTREMITY;  Surgeon: Karla Wheeler DPM;  Location: [facility] OR;  Service: Podiatry;  Laterality: Left;    KIDNEY TRANSPLANT  05/21/2016    LEFT HEART CATHETERIZATION Left 7/21/2019    Procedure: CATHETERIZATION, HEART, LEFT;  Surgeon: Andrew Valdez MD;  Location: [facility] CATH LAB;  Service: Cardiology;  Laterality: Left;    LEG AMPUTATION THROUGH KNEE  2011    right LE, started as nail puncture leading to diabetic ulcer    SKIN FULL THICKNESS GRAFT Left 10/7/2019    Procedure: APPLICATION, GRAFT, SKIN, FULL-THICKNESS;  Surgeon: Lenard Alarcon MD;  Location: [address];  Service: Plastics;  Laterality: Left;    SURGICAL REMOVAL OF LESION OF FACE Right 10/7/2019    Procedure: EXCISION, LESION, FACE;  Surgeon: Lenard Alarcon MD;  [address];  Service: Plastics;  Laterality: Right;       Review of patient's allergies indicates:  No Known Allergies    No current facility-administered medications on file prior to encounter.     Current Outpatient Medications on File Prior to Encounter   Medication Sig    amLODIPine (NORVASC) 10 MG tablet Take 1 tablet  "(10 mg total) by mouth once daily.    aspirin (ECOTRIN) 81 MG EC tablet Take 1 tablet (81 mg total) by mouth once daily.    atorvastatin (LIPITOR) 80 MG tablet Take 1 tablet (80 mg total) by mouth once daily.    BD INSULIN SYRINGE ULTRA-FINE 1 mL 31 gauge x 5/16 Syrg USE ONE AS DIRECTED FOUR TIMES DAILY WITH MEALS AND AT BEDTIME    BD TREY 2ND GEN PEN NEEDLE 32 gauge x 5/32" Ndle USE ONE SUBCUTANEOUSLY FOUR TIMES DAILY     BELBUCA 600 mcg Film     blood sugar diagnostic Strp 1 each by Misc.(Non-Drug; Combo Route) route 3 (three) times daily.    blood-glucose meter kit Use as instructed    blood-glucose meter,continuous (DEXCOM G6 ) Misc Check sugars at least 3 times a day    blood-glucose sensor (DEXCOM G6 SENSOR) Keyana Check sugars 3 times a day    cephALEXin (KEFLEX) 500 MG capsule     cloNIDine (CATAPRES) 0.1 MG tablet Take 1 tablet (0.1 mg total) by mouth 3 (three) times daily.    clopidogreL (PLAVIX) 75 mg tablet Take 1 tablet (75 mg total) by mouth once daily.    diclofenac (FLECTOR) 1.3 % PT12 Apply 1 packet topically once daily.    ergocalciferol (ERGOCALCIFEROL) 50,000 unit Cap Take 1 capsule (50,000 Units total) by mouth every 7 days. Take on Mondays    flash glucose scanning reader (FREESTYLE BRYAN 14 DAY READER) Misc 1 Device by Misc.(Non-Drug; Combo Route) route as needed.    flash glucose sensor (FREESTYLE BRYAN 14 DAY SENSOR) Kit 1 kit by Misc.(Non-Drug; Combo Route) route every 14 (fourteen) days.    FLUAD [id number], 65 YR UP,,PF, 45 mcg (15 mcg x 3)/0.5 mL Syrg     fluorouracil (EFUDEX) 5 % cream Apply topically 2 (two) times daily. For 3 weeks. Will cause redness and irritation.    furosemide (LASIX) 40 MG tablet Take 1 tablet (40 mg total) by mouth daily as needed (Leg swelling, Nocturnal SOB).    gabapentin (NEURONTIN) 300 MG capsule     HYDROcodone-acetaminophen (NORCO)  mg per tablet 1 tablet by Per G Tube route every 6 (six) hours as needed for Pain.    " hydrocortisone 2.5 % cream Apply topically 2 (two) times daily.    hydrOXYzine HCL (ATARAX) 25 MG tablet TAKE ONE TABLET BY MOUTH THREE TIMES DAILY AS NEEDED FOR ITCHING     hydrOXYzine pamoate (VISTARIL) 25 MG Cap Take 1 capsule (25 mg total) by mouth every 8 (eight) hours as needed (anxiety).    insulin aspart U-100 (NOVOLOG) 100 unit/mL (3 mL) InPn pen Inject 5 units three times a day with meal if BG > 300.    ipratropium (ATROVENT) 0.02 % nebulizer solution Take 2.5 mLs (500 mcg total) by nebulization 4 (four) times daily.    isosorbide mononitrate (IMDUR) 30 MG 24 hr tablet Take 2 tablets (60 mg total) by mouth once daily.    ketoconazole (NIZORAL) 2 % cream Apply topically 2 (two) times daily.    levalbuterol (XOPENEX) 1.25 mg/3 mL nebulizer solution Take 3 mLs (1.25 mg total) by nebulization every 6 to 8 hours as needed for Wheezing or Shortness of Breath.    linaCLOtide (LINZESS) 72 mcg Cap capsule Take 1 capsule (72 mcg total) by mouth before breakfast.    methylphenidate HCl (RITALIN) 10 MG tablet TAKE ONE TABLET BY MOUTH TWICE A DAY WITH MEALS    metoprolol tartrate (LOPRESSOR) 25 MG tablet Take 1 tablet (25 mg total) by mouth 2 (two) times daily.    mupirocin (BACTROBAN) 2 % ointment Apply topically 3 (three) times daily.    mupirocin (BACTROBAN) 2 % ointment Apply topically once daily. With dressing changes    mupirocin calcium 2% (BACTROBAN) 2 % cream Apply topically 3 (three) times daily.    mycophenolate (CELLCEPT) 250 mg Cap Take 1 capsule (250 mg total) by mouth 2 (two) times daily.    naloxone (NARCAN) 4 mg/actuation Spry 1 spray by Nasal route once.    nitroGLYCERIN (NITROSTAT) 0.4 MG SL tablet Place 1 tablet (0.4 mg total) under the tongue every 5 (five) minutes as needed.    ondansetron (ZOFRAN) 4 MG tablet Take 1 tablet (4 mg total) by mouth every 6 (six) hours as needed for Nausea.    ondansetron (ZOFRAN-ODT) 4 MG TbDL Take 1 tablet (4 mg total) by mouth every 8 (eight)  hours as needed.    oxyCODONE-acetaminophen (PERCOCET)  mg per tablet     predniSONE (DELTASONE) 5 MG tablet TAKE ONE TABLET BY MOUTH ONE TIME DAILY     semaglutide (OZEMPIC) 1 mg/dose (2 mg/1.5 mL) PnIj Inject 1 mg under the skin every 7 days.    senna-docusate 8.6-50 mg (PERICOLACE) 8.6-50 mg per tablet Take 2 tablets by mouth once daily.    sertraline (ZOLOFT) 25 MG tablet Take 1 tablet (25 mg total) by mouth once daily. For depression and anxiety    sevelamer carbonate (RENVELA) 800 mg Tab     tacrolimus (PROGRAF) 0.5 MG Cap Take 2 capsules (1 mg total) by mouth every 12 (twelve) hours.    torsemide (DEMADEX) 20 MG Tab Take 2 tablets (40 mg total) by mouth once daily.    VIOS AEROSOL DELIVERY SYSTEM Keyana USE Q 4 H PRN     Family History       Problem Relation (Age of Onset)    Cancer Father    Diabetes Father    Heart failure Father, Mother    Stroke Father          Tobacco Use    Smoking status: Former Smoker     Packs/day: 1.00     Years: 40.00     Pack years: 40.00     Quit date: 2013     Years since quittin.5    Smokeless tobacco: Never Used   Substance and Sexual Activity    Alcohol use: Yes     Alcohol/week: 6.0 standard drinks     Types: 6 Cans of beer per week     Comment: seldom    Drug use: No    Sexual activity: Never     Review of Systems   Constitutional:  Negative for fatigue and fever.   HENT:  Negative for sinus pressure.    Eyes:  Negative for visual disturbance.   Respiratory:  Negative for shortness of breath.    Cardiovascular:  Negative for chest pain.   Gastrointestinal:  Positive for nausea. Negative for vomiting.   Genitourinary:  Negative for difficulty urinating.   Musculoskeletal:  Positive for gait problem and joint swelling. Negative for back pain.   Skin:  Negative for rash.   Neurological:  Negative for headaches.   Psychiatric/Behavioral:  Negative for confusion.    Objective:     Vital Signs (Most Recent):  Temp: 98.4 °F (36.9 °C) (22  0406)  Pulse: 84 (08/04/22 0630)  Resp: 18 (08/04/22 0630)  BP: (!) 154/83 (08/04/22 0630)  SpO2: 96 % (08/04/22 0630)   Vital Signs (24h Range):  Temp:  [98.4 °F (36.9 °C)] 98.4 °F (36.9 °C)  Pulse:  [82-88] 84  Resp:  [16-19] 18  SpO2:  [96 %-97 %] 96 %  BP: (154-177)/(77-85) 154/83     Weight: 102.5 kg (226 lb)  Body mass index is 31.52 kg/m².    Physical Exam  Constitutional:       General: He is not in acute distress.     Appearance: He is well-developed. He is obese. He is not diaphoretic.   HENT:      Head: Normocephalic and atraumatic.   Eyes:      Pupils: Pupils are equal, round, and reactive to light.   Cardiovascular:      Rate and Rhythm: Normal rate and regular rhythm.      Heart sounds: Normal heart sounds. No murmur heard.    No friction rub. No gallop.   Pulmonary:      Effort: Pulmonary effort is normal. No respiratory distress.      Breath sounds: Normal breath sounds. No stridor. No wheezing or rales.   Abdominal:      General: Bowel sounds are normal. There is no distension.      Palpations: Abdomen is soft. There is no mass.      Tenderness: There is no abdominal tenderness. There is no guarding.   Musculoskeletal:      Comments: Right bka, left transmetatarsal amputation   Skin:     General: Skin is warm.      Findings: No erythema.   Neurological:      Mental Status: He is alert and oriented to person, place, and time.         CRANIAL NERVES     CN III, IV, VI   Pupils are equal, round, and reactive to light.     Significant Labs:   Results for orders placed or performed during the hospital encounter of 08/04/22   CBC auto differential   Result Value Ref Range    WBC 5.41 3.90 - 12.70 K/uL    RBC 4.28 (L) 4.60 - 6.20 M/uL    Hemoglobin 13.0 (L) 14.0 - 18.0 g/dL    Hematocrit 42.6 40.0 - 54.0 %     (H) 82 - 98 fL    MCH 30.4 27.0 - 31.0 pg    MCHC 30.5 (L) 32.0 - 36.0 g/dL    RDW 13.5 11.5 - 14.5 %    Platelets 201 150 - 450 K/uL    MPV 9.2 9.2 - 12.9 fL    Immature Granulocytes 0.4 0.0  - 0.5 %    Gran # (ANC) 3.7 1.8 - 7.7 K/uL    Immature Grans (Abs) 0.02 0.00 - 0.04 K/uL    Lymph # 1.0 1.0 - 4.8 K/uL    Mono # 0.6 0.3 - 1.0 K/uL    Eos # 0.0 0.0 - 0.5 K/uL    Baso # 0.02 0.00 - 0.20 K/uL    nRBC 0 0 /100 WBC    Gran % 69.1 38.0 - 73.0 %    Lymph % 18.1 18.0 - 48.0 %    Mono % 11.8 4.0 - 15.0 %    Eosinophil % 0.2 0.0 - 8.0 %    Basophil % 0.4 0.0 - 1.9 %    Differential Method Automated    Comprehensive metabolic panel   Result Value Ref Range    Sodium 136 136 - 145 mmol/L    Potassium 4.9 3.5 - 5.1 mmol/L    Chloride 102 95 - 110 mmol/L    CO2 24 23 - 29 mmol/L    Glucose 250 (H) 70 - 110 mg/dL    BUN 30 (H) 8 - 23 mg/dL    Creatinine 1.8 (H) 0.5 - 1.4 mg/dL    Calcium 8.4 (L) 8.7 - 10.5 mg/dL    Total Protein 6.7 6.0 - 8.4 g/dL    Albumin 3.0 (L) 3.5 - 5.2 g/dL    Total Bilirubin 0.5 0.1 - 1.0 mg/dL    Alkaline Phosphatase 156 (H) 55 - 135 U/L     (H) 10 - 40 U/L    ALT 73 (H) 10 - 44 U/L    Anion Gap 10 8 - 16 mmol/L    eGFR 40 (A) >60 mL/min/1.73 m^2   COVID-19 Rapid Screening   Result Value Ref Range    SARS-CoV-2 RNA, Amplification, Qual Negative Negative     *Note: Due to a large number of results and/or encounters for the requested time period, some results have not been displayed. A complete set of results can be found in Results Review.        Significant Imaging: X-Ray Chest AP Portable  Narrative: EXAMINATION:  XR CHEST AP PORTABLE    CLINICAL HISTORY:  Encounter for other preprocedural examination    FINDINGS:  Single view of the chest.  08/15/2021 comparison    Cardiac silhouette is normal.  Aorta demonstrates atherosclerotic disease. The lungs demonstrate no evidence of active disease.  Mild bibasilar atelectasis.  No evidence of pleural effusion or pneumothorax.  Bones appear intact demonstrate scattered degenerative change.  Impression: No acute process seen.    Electronically signed by: Ryan Joya MD  Date:    08/04/2022  Time:    07:14  X-Ray Tibia Fibula 2 View  Left  Narrative: EXAMINATION:  XR TIBIA FIBULA 2 VIEW LEFT; XR ANKLE COMPLETE 3 VIEW LEFT    CLINICAL HISTORY:  XR TIBIA FIBULA 2 VIEW LEFT; XR ANKLE COMPLETE 3 VIEW LEFTUnspecified fall, initial encounter    COMPARISON:  None    FINDINGS:  Four views of the left tibia/fibula and three views of the left ankle were obtained.    Comminuted tibial metaphyseal fracture with angulation and mild displacement.  There is extension to the medial articular surface and involvement of the interosseous membrane between the tibia and fibula.  Transverse fracture of the distal fibula above the lateral malleolus with mild displacement and angulation.  Mildly comminuted proximal fibular fracture.  Moderate joint effusion of the ankle.  Small avulsion injury at the tip of the medial malleolus    Diffuse osteopenia and moderate soft tissue swelling.  Dense vascular calcifications.  Moderate degenerative changes of the ankle and knee joints.  Advanced degenerative changes within the midfoot.  Large plantar calcaneal spur.  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  Impression: See above.    Electronically signed by: Ryan Joya MD  Date:    08/04/2022  Time:    06:59  X-Ray Ankle Complete Left  Narrative: EXAMINATION:  XR TIBIA FIBULA 2 VIEW LEFT; XR ANKLE COMPLETE 3 VIEW LEFT    CLINICAL HISTORY:  XR TIBIA FIBULA 2 VIEW LEFT; XR ANKLE COMPLETE 3 VIEW LEFTUnspecified fall, initial encounter    COMPARISON:  None    FINDINGS:  Four views of the left tibia/fibula and three views of the left ankle were obtained.    Comminuted tibial metaphyseal fracture with angulation and mild displacement.  There is extension to the medial articular surface and involvement of the interosseous membrane between the tibia and fibula.  Transverse fracture of the distal fibula above the lateral malleolus with mild displacement and angulation.  Mildly comminuted proximal fibular fracture.  Moderate joint effusion of the  ankle.  Small avulsion injury at the tip of the medial malleolus    Diffuse osteopenia and moderate soft tissue swelling.  Dense vascular calcifications.  Moderate degenerative changes of the ankle and knee joints.  Advanced degenerative changes within the midfoot.  Large plantar calcaneal spur.  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  Impression: See above.    Electronically signed by: Ryan Joya MD  Date:    08/04/2022  Time:    06:59      Assessment/Plan:     * Closed fracture of left tibia and fibula  Patient with distal tib fib fracture   Ortho consulted on case and plans to operate later today  Cont npo  Morphine PRN  Patient with numerous comorbid conditions including  Cad, pvd, copd, diabetes  Poor functional capacity at home with METS < 4  Chest xray unremarkable  Ekg showed first degree AV block  Will obtain stat echo to evaluate LV function  Pt at least moderate risk for cardiac complications    Advance Care Planning     Patient is a full code and sdm is his sister.         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  Last A1c reviewed-   Lab Results   Component Value Date    HGBA1C 9.1 (H) 06/26/2020     Most recent fingerstick glucose reviewed- No results for input(s): POCTGLUCOSE in the last 24 hours.  Current correctional scale  High  Maintain anti-hyperglycemic dose as follows-   Antihyperglycemics (From admission, onward)            Start     Stop Route Frequency Ordered    08/04/22 0845  insulin aspart U-100 pen 1-10 Units         -- SubQ Every 6 hours PRN 08/04/22 0745        Hold Oral hypoglycemics while patient is in the hospital.    Coronary artery  disease of native artery of native heart with stable angina pectoris  Resume statin   Hold asa and plavix         Osteoarthritis of lumbar spine  Chronic pain  Seeing pain management outpatient       Essential hypertension  Resume home metoprolol  Hydralazine PRN      VTE Risk Mitigation (From admission, onward)         Ordered     Place sequential compression device  Until discontinued         08/04/22 0608                   Roddy Balbuena MD  Department of Hospital Medicine   [facility] - Emergency Dept.

## 2022-08-04 NOTE — ANESTHESIA PROCEDURE NOTES
Peripheral Block    Patient location during procedure: ED   Block not for primary anesthetic.  Reason for block: at surgeon's request and post-op pain management   Post-op Pain Location: left lower extremity   Start time: 8/4/2022 9:25 AM  Timeout: 8/4/2022 9:24 AM   End time: 8/4/2022 9:35 AM    Staffing  Authorizing Provider: Bernard Saini MD  Performing Provider: Bernard Saini MD    Preanesthetic Checklist  Completed: patient identified, IV checked, site marked, risks and benefits discussed, surgical consent, monitors and equipment checked, pre-op evaluation and timeout performed  Peripheral Block  Patient position: supine  Prep: ChloraPrep  Patient monitoring: heart rate, cardiac monitor, continuous pulse ox, continuous capnometry and frequent blood pressure checks  Block type: popliteal  Laterality: left  Injection technique: single shot  Needle  Needle type: Stimuplex   Needle gauge: 21 G  Needle length: 4 in  Needle localization: anatomical landmarks and ultrasound guidance   -ultrasound image captured on disc.  Assessment  Injection assessment: negative aspiration, negative parasthesia and local visualized surrounding nerve  Paresthesia pain: none  Heart rate change: no  Slow fractionated injection: yes    Medications:    Medications: bupivacaine (pf) (MARCAINE) injection 0.5% - Perineural, Left Popliteal   30 mL - 8/4/2022 9:30:00 AM    Additional Notes  VSS.  [facility] RN monitoring vitals throughout procedure.  Patient tolerated procedure well.

## 2022-08-04 NOTE — ASSESSMENT & PLAN NOTE
Patient with distal tib fib fracture   Ortho consulted on case and plans to operate later today  Cont npo  Morphine PRN  Patient with numerous comorbid conditions including  Cad, pvd, copd, diabetes  Poor functional capacity at home with METS < 4  Chest xray unremarkable  Ekg showed first degree AV block  Will obtain stat echo to evaluate LV function  Pt at least moderate risk for cardiac complications    Advance Care Planning     Patient is a full code and sdm is his sister.

## 2022-08-04 NOTE — TRANSFER OF CARE
"Anesthesia Transfer of Care Note    Patient: Lavelle Ladd    Procedure(s) Performed: Procedure(s) (LRB):  APPLICATION, EXTERNAL FIXATION DEVICE, LARGE, TIBIA (Left)    Patient location: PACU    Anesthesia Type: MAC    Transport from OR: Transported from OR on room air with adequate spontaneous ventilation    Post pain: adequate analgesia    Post assessment: no apparent anesthetic complications    Post vital signs: stable    Level of consciousness: sedated    Nausea/Vomiting: no nausea/vomiting    Complications: none    Transfer of care protocol was followed      Last vitals:   Visit Vitals  BP (!) 186/76   Pulse 86   Temp 36.9 °C (98.4 °F)   Resp 18   Ht 5' 11" (1.803 m)   Wt 102.5 kg (226 lb)   SpO2 100%   BMI 31.52 kg/m²     "

## 2022-08-04 NOTE — PHARMACY MED REC
"Admission Medication History     The home medication history was taken by Zeferino Hamilton.    You may go to "Admission" then "Reconcile Home Medications" tabs to review and/or act upon these items.      The home medication list has been updated by the Pharmacy department.    Please read ALL comments highlighted in yellow.    Please address this information as you see fit.     Feel free to contact us if you have any questions or require assistance.      The medications listed below were removed from the home medication list. Please reorder if appropriate:  Patient reports no longer taking the following medication(s):   BELBUCA 600MCG   KEFLEX 500MG   FLECTOR 1.3% PATCH   RITALIN 10MG   BACTROBAN OINTMENT   PERCOCET 10-325MG   PREDNISONE 5MG   TORSEMIDE 20MG    Medications listed below were obtained from: Patient/family and Analytic software- Doc[employer]ogy  (Not in a hospital admission)      Zeferino Hamilton  [id number]      Current Outpatient Medications on File Prior to Encounter   Medication Sig Dispense Refill Last Dose    amLODIPine (NORVASC) 10 MG tablet Take 1 tablet (10 mg total) by mouth once daily. 90 tablet 1 8/3/2022 at Unknown time    aspirin (ECOTRIN) 81 MG EC tablet Take 1 tablet (81 mg total) by mouth once daily. 90 tablet 1 8/3/2022 at Unknown time    atorvastatin (LIPITOR) 80 MG tablet Take 1 tablet (80 mg total) by mouth once daily. 90 tablet 1 8/3/2022 at Unknown time    clopidogreL (PLAVIX) 75 mg tablet Take 1 tablet (75 mg total) by mouth once daily. 30 tablet 0 8/3/2022 at Unknown time    ergocalciferol (ERGOCALCIFEROL) 50,000 unit Cap Take 1 capsule (50,000 Units total) by mouth every 7 days. Take on Mondays 4 capsule 11 Past Week at Unknown time    furosemide (LASIX) 40 MG tablet Take 1 tablet (40 mg total) by mouth daily as needed (Leg swelling, Nocturnal SOB). 30 tablet 0 8/3/2022 at Unknown time    gabapentin (NEURONTIN) 300 MG capsule Take 300 mg by mouth 3 (three) times " daily.   8/3/2022 at Unknown time    HYDROcodone-acetaminophen (NORCO)  mg per tablet 1 tablet by Per G Tube route every 6 (six) hours as needed for Pain. 15 tablet 0 8/3/2022 at Unknown time    hydrocortisone 2.5 % cream Apply topically 2 (two) times daily. 30 g 1 Past Week at Unknown time    insulin aspart U-100 (NOVOLOG) 100 unit/mL (3 mL) InPn pen Inject 5 units three times a day with meal if BG > 300. 6 mL 11 8/3/2022 at Unknown time    levalbuterol (XOPENEX) 1.25 mg/3 mL nebulizer solution Take 3 mLs (1.25 mg total) by nebulization every 6 to 8 hours as needed for Wheezing or Shortness of Breath. 288 mL 11 Past Week at Unknown time    LIDOcaine (LIDODERM) 5 % Place 1 patch onto the skin daily as needed. 12 hours on and 12 hours off   8/3/2022 at Unknown time    metoprolol tartrate (LOPRESSOR) 25 MG tablet Take 1 tablet (25 mg total) by mouth 2 (two) times daily. 180 tablet 1 8/3/2022 at Unknown time    mycophenolate (CELLCEPT) 250 mg Cap Take 1 capsule (250 mg total) by mouth 2 (two) times daily. 60 capsule 11 8/3/2022 at Unknown time    nitroGLYCERIN (NITROSTAT) 0.4 MG SL tablet Place 1 tablet (0.4 mg total) under the tongue every 5 (five) minutes as needed. 30 tablet 0 Past Month at Unknown time    ondansetron (ZOFRAN-ODT) 4 MG TbDL Take 1 tablet (4 mg total) by mouth every 8 (eight) hours as needed. 21 tablet 1 Past Week at Unknown time    sevelamer carbonate (RENVELA) 800 mg Tab Take 800 mg by mouth 3 (three) times daily with meals.   8/3/2022 at Unknown time    tacrolimus (PROGRAF) 0.5 MG Cap Take 2 capsules (1 mg total) by mouth every 12 (twelve) hours. 180 capsule 11 8/3/2022 at Unknown time    cloNIDine (CATAPRES) 0.1 MG tablet Take 1 tablet (0.1 mg total) by mouth 3 (three) times daily. (Patient not taking: Reported on 8/4/2022) 30 tablet 0 Not Taking at Unknown time    flash glucose scanning reader (FREESTYLE BRYAN 14 DAY READER) Misc 1 Device by Misc.(Non-Drug; Combo Route) route  as needed. 1 each 0     flash glucose sensor (FREESTYLE BRYAN 14 DAY SENSOR) Kit 1 kit by Misc.(Non-Drug; Combo Route) route every 14 (fourteen) days. 2 kit 11     ipratropium (ATROVENT) 0.02 % nebulizer solution Take 2.5 mLs (500 mcg total) by nebulization 4 (four) times daily. (Patient not taking: Reported on 8/4/2022) 120 vial 11 Not Taking at Unknown time    isosorbide mononitrate (IMDUR) 30 MG 24 hr tablet Take 2 tablets (60 mg total) by mouth once daily. (Patient not taking: Reported on 8/4/2022) 60 tablet 11 Not Taking at Unknown time    ketoconazole (NIZORAL) 2 % cream Apply topically 2 (two) times daily. 60 g 1     linaCLOtide (LINZESS) 72 mcg Cap capsule Take 1 capsule (72 mcg total) by mouth before breakfast. 30 capsule 0     naloxone (NARCAN) 4 mg/actuation Spry 1 spray by Nasal route once.       semaglutide (OZEMPIC) 1 mg/dose (2 mg/1.5 mL) PnIj Inject 1 mg under the skin every 7 days. (Patient taking differently: (Saturdays)) 6 Syringe 3     sertraline (ZOLOFT) 25 MG tablet Take 1 tablet (25 mg total) by mouth once daily. For depression and anxiety (Patient not taking: Reported on 8/4/2022) 30 tablet 11 Not Taking at Unknown time    VIOS AEROSOL DELIVERY SYSTEM Keyana USE Q 4 H PRN                              .

## 2022-08-04 NOTE — ANESTHESIA PREPROCEDURE EVALUATION
08/04/2022  Lavelel Ladd is a 67 y.o., male.    Pre-op Assessment    I have reviewed the Patient Summary Reports.    I have reviewed the Nursing Notes. I have reviewed the NPO Status.   I have reviewed the Medications.     Review of Systems  Anesthesia Hx:  No problems with previous Anesthesia   Denies Personal Hx of Anesthesia complications.   Cardiovascular:   Hypertension, well controlled CAD   ECG has been reviewed. EF normal  Diastolic dysfunction   Pulmonary:   COPD, mild    Renal/:   Chronic Renal Disease, CRI S/p renal transplant 2 years ago   Hepatic/GI:   PUD, GERD, well controlled Liver Disease, Hepatitis, C    Musculoskeletal:   Arthritis     Neurological:   Neuromuscular Disease,    Endocrine:   Diabetes, well controlled, type 2, using insulin  Obesity / BMI > 30      Physical Exam  General:  Well nourished      Airway/Jaw/Neck:  Airway Findings: Mouth Opening: Normal   Tongue: Normal   General Airway Assessment: Adult Mallampati: II  Improves to II with phonation.  TM Distance: Normal, at least 6 cm   Jaw/Neck Findings:  Neck ROM: Normal ROM        Chest/Lungs:  Chest/Lungs Findings: Clear to auscultation, Normal Respiratory Rate      Heart/Vascular:  Heart Findings: Rate: Normal  Rhythm: Regular Rhythm  Sounds: Normal      Musculoskeletal:  Musculoskeletal Findings: Amputation  Right BKA,  Left partial foot     Mental Status:  Mental Status Findings:  Cooperative, Alert and Oriented         Anesthesia Plan  Type of Anesthesia, risks & benefits discussed:  Anesthesia Type:  general, MAC    Patient's Preference:   Plan Factors:          Intra-op Monitoring Plan: standard ASA monitors  Intra-op Monitoring Plan Comments:   Post Op Pain Control Plan: multimodal analgesia and peripheral nerve block  Post Op Pain Control Plan Comments:     Induction:   IV  Beta Blocker:  Patient is on a  Beta-Blocker and has received one dose within the past 24 hours (No further documentation required).       Informed Consent: Informed consent signed with the Patient and all parties understand the risks and agree with anesthesia plan.  All questions answered.  Anesthesia consent signed with patient.  ASA Score: 3     Day of Surgery Review of History & Physical: I have interviewed and examined the patient. I have reviewed the patient's H&P dated:  There are no significant changes.  Significant changes noted.            Ready For Surgery From Anesthesia Perspective.       Past Medical History:   Diagnosis Date    DINORAH (acute kidney injury) 2016    Arthritis     CAD in native artery 2019    CHF (congestive heart failure)     Chronic obstructive pulmonary disease 2016    Coronary artery disease involving native coronary artery of native heart without angina pectoris 2016    CRI (chronic renal insufficiency) 2019     donor kidney transplant for DM 16     Induction with Thymo x3 and IV solumedrol to total 875mg  Kidney Biopsy  2016: 16 glomeruli, ACR type 1 AVR type 2, significant microcirculatory changes, c4d negative, No DSA, 5 to10% fibrosis. Treated with thymo x8 2016- no rejection      Diastolic heart failure     Encounter for blood transfusion     ESRD on RRT since 10/2013 10/29/2013    Biopsy proven diabetic nephropathy and lymphoplasmacytic interstitial infiltrate not c/w with AIN (ddx sjogrens or assoc with tamm-horsefall protein extravasation)     GERD (gastroesophageal reflux disease)     History of hepatitis C, s/p successful Rx w/ SVR12 - 2017    Completed 12 weeks harvoni w/ SVR    Hyperlipidemia     Hypertension     PAD (peripheral artery disease) 2019    PIC line (peripherally inserted central catheter) flush     Prophylactic immunotherapy     Proteinuria     PVD (peripheral vascular disease) 2017    RLE BKA CT 16  Extensive atherosclerotic disease of the aorta and mesenteric arteries.     Renal hypertension     Type 2 diabetes mellitus with diabetic neuropathy, with long-term current use of insulin 12/1/2016    Vitamin B12 deficiency        Past Surgical History:   Procedure Laterality Date    AORTOGRAPHY WITH SERIALOGRAPHY N/A 6/14/2018    Procedure: LEFT LEG ANGIOGRAM;  Surgeon: Donal Mcdonald MD;  Location: [facility] CATH LAB;  Service: Vascular;  Laterality: N/A;    av bovine graft      Left UE    AV FISTULA PLACEMENT      left UE    CARDIAC CATHETERIZATION  02/2015    CLOSURE OF WOUND Left 9/24/2018    Procedure: CLOSURE, WOUND;  Surgeon: Karla Wheeler DPM;  Location: [facility] OR;  Service: Podiatry;  Laterality: Left;  Secondary Wound closure, extensive    CLOSURE OF WOUND Left 11/5/2018    Procedure: CLOSURE, WOUND;  Surgeon: Karla Wheeler DPM;  Location: [facility] OR;  Service: Podiatry;  Laterality: Left;    DEBRIDEMENT OF MULTIPLE METATARSAL BONES Left 11/5/2018    Procedure: DEBRIDEMENT, METATARSAL BONE, 2 OR MORE BONES;  Surgeon: Karla Wheeler DPM;  Location: [facility] OR;  Service: Podiatry;  Laterality: Left;    EXCISION OF SKIN Left 9/27/2019    Procedure: EXCISION, SKIN;  Surgeon: Lenard Alarcon MD;  Location: [address];  Service: Plastics;  Laterality: Left;  Plastics set, NIMS monitor, [employer]Cell    FOOT AMPUTATION THROUGH METATARSAL Left 9/21/2018    Procedure: AMPUTATION, FOOT, TRANSMETATARSAL;  Surgeon: Karla Wheeler DPM;  Location: [facility] OR;  Service: Podiatry;  Laterality: Left;    FOOT AMPUTATION THROUGH METATARSAL Left 10/31/2018    Procedure: AMPUTATION, FOOT, TRANSMETATARSAL;  Surgeon: Karla Wheeler DPM;  Location: [facility] OR;  Service: Podiatry;  Laterality: Left;    FOOT AMPUTATION THROUGH METATARSAL Left 11/5/2018    Procedure: AMPUTATION, FOOT, TRANSMETATARSAL;  Surgeon: Karla Wheeler DPM;  Location: [facility] OR;  Service: Podiatry;  Laterality: Left;  revisional  transmetatarsal amputation, Left foot    IRRIGATION AND DEBRIDEMENT OF LOWER EXTREMITY Left 10/31/2018    Procedure: IRRIGATION AND DEBRIDEMENT, LOWER EXTREMITY;  Surgeon: Karla Wheeler DPM;  Location: [facility] OR;  Service: Podiatry;  Laterality: Left;    KIDNEY TRANSPLANT  2016    LEFT HEART CATHETERIZATION Left 2019    Procedure: CATHETERIZATION, HEART, LEFT;  Surgeon: Andrew Valdez MD;  Location: [facility] CATH LAB;  Service: Cardiology;  Laterality: Left;    LEG AMPUTATION THROUGH KNEE      right LE, started as nail puncture leading to diabetic ulcer    SKIN FULL THICKNESS GRAFT Left 10/7/2019    Procedure: APPLICATION, GRAFT, SKIN, FULL-THICKNESS;  Surgeon: Lenard Alarcon MD;  Location: [facility] OR [address] [place] [address];  Service: Plastics;  Laterality: Left;    SURGICAL REMOVAL OF LESION OF FACE Right 10/7/2019    Procedure: EXCISION, LESION, FACE;  Surgeon: Lenard Alarcon MD;  Location: [facility] OR [facility] FLR;  Service: Plastics;  Laterality: Right;       Family History   Problem Relation Age of Onset    Cancer Father     Diabetes Father     Heart failure Father     Stroke Father     Heart failure Mother     Kidney disease Neg Hx        Social History     Socioeconomic History    Marital status: Single   Occupational History    Occupation: Disabled     Employer: DISABLED   Tobacco Use    Smoking status: Former Smoker     Packs/day: 1.00     Years: 40.00     Pack years: 40.00     Quit date: 2013     Years since quittin.5    Smokeless tobacco: Never Used   Substance and Sexual Activity    Alcohol use: Yes     Alcohol/week: 6.0 standard drinks     Types: 6 Cans of beer per week     Comment: seldom    Drug use: No    Sexual activity: Never   Social History Narrative    Lives alone. Retired from construction and various jobs, now retired. Would like to return to work PT to alleviate boredom.       Current Facility-Administered Medications   Medication Dose Route Frequency Provider Last Rate  Last Admin    0.9%  NaCl infusion   Intravenous Continuous Roddy Balbuena MD        acetaminophen tablet 650 mg  650 mg Oral Q6H PRN Harshad Brown MD        albuterol-ipratropium 2.5 mg-0.5 mg/3 mL nebulizer solution 3 mL  3 mL Nebulization Q4H PRN Harshad Brown MD        aluminum-magnesium hydroxide-simethicone 200-200-20 mg/5 mL suspension 30 mL  30 mL Oral Q6H PRN Harshad Brown MD        atorvastatin tablet 80 mg  80 mg Oral Daily Roddy Balbuena MD        dextrose 10% bolus 125 mL  12.5 g Intravenous PRN Roddy Balbuena MD        dextrose 10% bolus 250 mL  25 g Intravenous PRN Roddy Balbuena MD        glucagon (human recombinant) injection 1 mg  1 mg Intramuscular PRN Roddy Balbuena MD        guaiFENesin 100 mg/5 ml syrup 200 mg  200 mg Oral Q4H PRN Harshad Brown MD        hydrALAZINE injection 10 mg  10 mg Intravenous Q8H PRN Harshad Brown MD        insulin aspart U-100 pen 1-10 Units  1-10 Units Subcutaneous Q6H PRN Roddy Balbuena MD        metoprolol tartrate (LOPRESSOR) tablet 25 mg  25 mg Oral BID Roddy Balbuena MD        morphine injection 2 mg  2 mg Intravenous Q4H PRN Harshad Brown MD        morphine injection 4 mg  4 mg Intravenous Q4H PRN Harshad Brown MD   4 mg at 08/04/22 0848    ondansetron injection 4 mg  4 mg Intravenous Q8H PRN Harshad Brown MD   4 mg at 08/04/22 0853    sertraline tablet 25 mg  25 mg Oral Daily Roddy Balbuena MD        sodium chloride 0.9% flush 10 mL  10 mL Intravenous PRN Bernard Saini MD        sodium chloride 0.9% flush 10 mL  10 mL Intravenous PRN Bernard Saini MD        tacrolimus capsule 1 mg  1 mg Oral BID Roddy Balbuena MD         Current Outpatient Medications   Medication Sig Dispense Refill    amLODIPine (NORVASC) 10 MG tablet Take 1 tablet (10 mg total) by mouth once daily. 90 tablet 1    aspirin (ECOTRIN) 81 MG EC tablet Take 1 tablet (81 mg total) by mouth once daily. 90 tablet 1    atorvastatin (LIPITOR) 80 MG tablet Take 1 tablet (80 mg total) by mouth once daily. 90 tablet 1    BD  "INSULIN SYRINGE ULTRA-FINE 1 mL 31 gauge x 5/16 Syrg USE ONE AS DIRECTED FOUR TIMES DAILY WITH MEALS AND AT BEDTIME 120 each 10    BD TREY 2ND GEN PEN NEEDLE 32 gauge x 5/32" Ndle USE ONE SUBCUTANEOUSLY FOUR TIMES DAILY  200 each 11    BELBUCA 600 mcg Film       blood sugar diagnostic Strp 1 each by Misc.(Non-Drug; Combo Route) route 3 (three) times daily. 100 each 11    blood-glucose meter kit Use as instructed 1 each 0    blood-glucose meter,continuous (DEXCOM G6 ) Misc Check sugars at least 3 times a day 1 each 0    blood-glucose sensor (DEXCOM G6 SENSOR) Keyana Check sugars 3 times a day 1 Device 0    cephALEXin (KEFLEX) 500 MG capsule       cloNIDine (CATAPRES) 0.1 MG tablet Take 1 tablet (0.1 mg total) by mouth 3 (three) times daily. 30 tablet 0    clopidogreL (PLAVIX) 75 mg tablet Take 1 tablet (75 mg total) by mouth once daily. 30 tablet 0    diclofenac (FLECTOR) 1.3 % PT12 Apply 1 packet topically once daily.      ergocalciferol (ERGOCALCIFEROL) 50,000 unit Cap Take 1 capsule (50,000 Units total) by mouth every 7 days. Take on Mondays 4 capsule 11    flash glucose scanning reader (FREESTYLE BRYAN 14 DAY READER) Misc 1 Device by Misc.(Non-Drug; Combo Route) route as needed. 1 each 0    flash glucose sensor (FREESTYLE BRYAN 14 DAY SENSOR) Kit 1 kit by Misc.(Non-Drug; Combo Route) route every 14 (fourteen) days. 2 kit 11    FLUAD [id number], 65 YR UP,,PF, 45 mcg (15 mcg x 3)/0.5 mL Syrg       fluorouracil (EFUDEX) 5 % cream Apply topically 2 (two) times daily. For 3 weeks. Will cause redness and irritation. 40 g 2    furosemide (LASIX) 40 MG tablet Take 1 tablet (40 mg total) by mouth daily as needed (Leg swelling, Nocturnal SOB). 30 tablet 0    gabapentin (NEURONTIN) 300 MG capsule       HYDROcodone-acetaminophen (NORCO)  mg per tablet 1 tablet by Per G Tube route every 6 (six) hours as needed for Pain. 15 tablet 0    hydrocortisone 2.5 % cream Apply topically 2 (two) times " daily. 30 g 1    hydrOXYzine HCL (ATARAX) 25 MG tablet TAKE ONE TABLET BY MOUTH THREE TIMES DAILY AS NEEDED FOR ITCHING  45 tablet 0    hydrOXYzine pamoate (VISTARIL) 25 MG Cap Take 1 capsule (25 mg total) by mouth every 8 (eight) hours as needed (anxiety). 20 capsule 0    insulin aspart U-100 (NOVOLOG) 100 unit/mL (3 mL) InPn pen Inject 5 units three times a day with meal if BG > 300. 6 mL 11    ipratropium (ATROVENT) 0.02 % nebulizer solution Take 2.5 mLs (500 mcg total) by nebulization 4 (four) times daily. 120 vial 11    isosorbide mononitrate (IMDUR) 30 MG 24 hr tablet Take 2 tablets (60 mg total) by mouth once daily. 60 tablet 11    ketoconazole (NIZORAL) 2 % cream Apply topically 2 (two) times daily. 60 g 1    levalbuterol (XOPENEX) 1.25 mg/3 mL nebulizer solution Take 3 mLs (1.25 mg total) by nebulization every 6 to 8 hours as needed for Wheezing or Shortness of Breath. 288 mL 11    LIDODERM 5 % Place 1 patch onto the skin daily as needed. 12 hours on and 12 hours off      linaCLOtide (LINZESS) 72 mcg Cap capsule Take 1 capsule (72 mcg total) by mouth before breakfast. 30 capsule 0    methylphenidate HCl (RITALIN) 10 MG tablet TAKE ONE TABLET BY MOUTH TWICE A DAY WITH MEALS 60 tablet 0    metoprolol tartrate (LOPRESSOR) 25 MG tablet Take 1 tablet (25 mg total) by mouth 2 (two) times daily. 180 tablet 1    mupirocin (BACTROBAN) 2 % ointment Apply topically 3 (three) times daily. 30 g 1    mupirocin (BACTROBAN) 2 % ointment Apply topically once daily. With dressing changes 22 g 1    mupirocin calcium 2% (BACTROBAN) 2 % cream Apply topically 3 (three) times daily. 30 g 1    mycophenolate (CELLCEPT) 250 mg Cap Take 1 capsule (250 mg total) by mouth 2 (two) times daily. 60 capsule 11    naloxone (NARCAN) 4 mg/actuation Spry 1 spray by Nasal route once.      nitroGLYCERIN (NITROSTAT) 0.4 MG SL tablet Place 1 tablet (0.4 mg total) under the tongue every 5 (five) minutes as needed. 30 tablet 0     ondansetron (ZOFRAN) 4 MG tablet Take 1 tablet (4 mg total) by mouth every 6 (six) hours as needed for Nausea. 12 tablet 0    ondansetron (ZOFRAN-ODT) 4 MG TbDL Take 1 tablet (4 mg total) by mouth every 8 (eight) hours as needed. 21 tablet 1    oxyCODONE-acetaminophen (PERCOCET)  mg per tablet       predniSONE (DELTASONE) 5 MG tablet TAKE ONE TABLET BY MOUTH ONE TIME DAILY  30 tablet 11    semaglutide (OZEMPIC) 1 mg/dose (2 mg/1.5 mL) PnIj Inject 1 mg under the skin every 7 days. 6 Syringe 3    senna-docusate 8.6-50 mg (PERICOLACE) 8.6-50 mg per tablet Take 2 tablets by mouth once daily. 60 tablet 3    sertraline (ZOLOFT) 25 MG tablet Take 1 tablet (25 mg total) by mouth once daily. For depression and anxiety 30 tablet 11    sevelamer carbonate (RENVELA) 800 mg Tab       tacrolimus (PROGRAF) 0.5 MG Cap Take 2 capsules (1 mg total) by mouth every 12 (twelve) hours. 180 capsule 11    torsemide (DEMADEX) 20 MG Tab Take 2 tablets (40 mg total) by mouth once daily. 60 tablet 11    VIOS AEROSOL DELIVERY SYSTEM [place] USE Q 4 H PRN       Facility-Administered Medications Ordered in Other Encounters   Medication Dose Route Frequency Provider Last Rate Last Admin    midazolam injection   Intravenous PRN Bernard Saini MD   2 mg at 08/04/22 0925       Review of patient's allergies indicates:  No Known Allergies      Physical Exam  General: Well nourished    Airway:  Mallampati: II / II  Mouth Opening: Normal  TM Distance: Normal, at least 6 cm  Tongue: Normal  Neck ROM: Normal ROM    Chest/Lungs:  Clear to auscultation, Normal Respiratory Rate    Heart:  Rate: Normal  Rhythm: Regular Rhythm  Sounds: Normal    Musculoskeletal:  Amputation  Right BKA,  Left partial foot    Lab Results   Component Value Date    WBC 5.41 08/04/2022    HGB 13.0 (L) 08/04/2022    HCT 42.6 08/04/2022     (H) 08/04/2022     08/04/2022         Anesthesia Plan  Type of Anesthesia, risks & benefits  discussed:    Anesthesia Type: general, MAC  Intra-op Monitoring Plan: standard ASA monitors  Post Op Pain Control Plan: multimodal analgesia and peripheral nerve block  Induction:  IV  Informed Consent: Informed consent signed with the Patient and all parties understand the risks and agree with anesthesia plan.  All questions answered.   ASA Score: 3  Day of Surgery Review of History & Physical: I have interviewed and examined the patient. I have reviewed the patient's H&P dated:  Significant changes noted.     Ready For Surgery From Anesthesia Perspective.       .

## 2022-08-04 NOTE — ANESTHESIA POSTPROCEDURE EVALUATION
Anesthesia Post Evaluation    Patient: Lavelle Ladd    Procedure(s) Performed: Procedure(s) (LRB):  APPLICATION, EXTERNAL FIXATION DEVICE, LARGE, TIBIA (Left)    Final Anesthesia Type: MAC      Patient location during evaluation: PACU  Patient participation: Yes- Able to Participate  Level of consciousness: awake  Post-procedure vital signs: reviewed and stable  Pain management: adequate  Airway patency: patent    PONV status at discharge: No PONV  Anesthetic complications: no      Cardiovascular status: hemodynamically stable  Respiratory status: unassisted  Hydration status: euvolemic  Follow-up not needed.          Vitals Value Taken Time   /84 08/04/22 1651   Temp 36.6 °C (97.9 °F) 08/04/22 1600   Pulse 77 08/04/22 1653   Resp 15 08/04/22 1653   SpO2 93 % 08/04/22 1653   Vitals shown include unvalidated device data.      No case tracking events are documented in the log.      Pain/Shereen Score: Pain Rating Prior to Med Admin: 0 (8/4/2022  4:25 PM)  Shereen Score: 10 (8/4/2022  4:45 PM)

## 2022-08-05 PROBLEM — L89.222 PRESSURE INJURY OF LEFT HIP, STAGE 2: Status: ACTIVE | Noted: 2022-01-01

## 2022-08-05 NOTE — PT/OT/SLP EVAL
Occupational Therapy   Evaluation    Name: Lavelle Ladd  MRN: [id number]  Admitting Diagnosis:  Closed fracture of left tibia and fibula  Recent Surgery: Procedure(s) (LRB):  APPLICATION, EXTERNAL FIXATION DEVICE, LARGE, TIBIA (Left)  CLOSED REDUCTION, TIBIA (Left) 1 Day Post-Op    Recommendations:     Discharge Recommendations: nursing facility, skilled  Discharge Equipment Recommendations:  slide board  Barriers to discharge:  Decreased caregiver support    Assessment:     Lavelle Ladd is a 67 y.o. male with a medical diagnosis of Closed fracture of left tibia and fibula.  He presents with the following performance deficits affecting function: weakness, impaired endurance, impaired self care skills, impaired functional mobility, impaired balance, decreased lower extremity function, decreased safety awareness, pain.      Rehab Prognosis: Fair; patient would benefit from acute skilled OT services to address these deficits and reach maximum level of function.       Plan:     Patient to be seen 2 x/week to address the above listed problems via self-care/home management, therapeutic activities, therapeutic exercises  · Plan of Care Expires: 08/19/22  · Plan of Care Reviewed with: patient    Subjective     Chief Complaint: Pain in LLE  Patient/Family Comments/goals: increase strength and go home.    Occupational Profile:  Living Environment: lives with a girl friend in a 1 story house with a ramp to enter. Pt reports girl friend unable to assist pt.  Previous level of function: Pt mod I with bathing, dressing, and functional mobility. Pt reports he has a prosthesis but does not use it. Pt also reports he stays in bed most of the time, but does t/f to his wheelchair everyday to care for his dog.   Roles and Routines: does not drive or work. Pt takes care of his pet dog.   Equipment Used at Home:  wheelchair, bedside commode, shower chair, grab bar  Assistance upon Discharge: unknown    Pain/Comfort:  · Pain Rating  "1: 6/10  · Location - Side 1: Left  · Location - Orientation 1: lower  · Location 1: leg  · Pain Addressed 1: Other (see comments) (activity pacing)      Objective:     Communicated with: nurse and epic chart review prior to session.  Patient found supine with bed alarm, peripheral IV, telemetry, external fixator upon OT entry to room.    General Precautions: Standard, fall   Orthopedic Precautions:LLE non weight bearing   Braces: N/A  Respiratory Status: Room air    Bed Mobility:    · Patient completed Rolling/Turning to Left with  stand by assistance and using bedrail  · Patient completed Supine to Sit with stand by assistance  · Patient completed Sit to Supine with stand by assistance    Functional Mobility/Transfers:  · Unable to assess t/f due to weightbearing status.    Cognitive/Visual Perceptual:  Cognitive/Psychosocial Skills:     -       Oriented to: Person, Place, Time and Situation   -       Follows Commands/attention:Follows one-step commands  -       Safety awareness/insight to disability: intact   -       Mood/Affect/Coping skills/emotional control: Agitated    Physical Exam:  Balance:    -       fair+ sitting balance  Edema:  moderate  Upper Extremity Range of Motion:     -       Right Upper Extremity: WNL  -       Left Upper Extremity: WNL  Upper Extremity Strength:    -       Right Upper Extremity: 4+/5 grossly  -       Left Upper Extremity: 4+/5 grossly   Strength:    -       Right Upper Extremity: WNL  -       Left Upper Extremity: WNL    AMPAC 6 Click ADL:  AMPAC Total Score: 16    Treatment & Education:  Pt resistive to therapy today, stating "I want y'all to leave." Pt requiring max encouragement to sit EOB. Patient educated on role of OT in acute setting and benefits of participation. Educated on techniques to use to increase independence and discussed use of slide board for functional transfers. Educated on importance of trying to sit EOB to improve strength and tolerance to upright " position. Encouraged completion of B UE AROM therex throughout the day to tolerance to increase functional strength and activity tolerance. Patient stated understanding and in agreement with POC.  Education:    Patient left HOB elevated with all lines intact, call button in reach, bed alarm on and nurse notified    GOALS:   Multidisciplinary Problems     Occupational Therapy Goals        Problem: Occupational Therapy    Goal Priority Disciplines Outcome Interventions   Occupational Therapy Goal     OT, PT/OT     Description: Goals to be met by: 2022     Patient will increase functional independence with ADLs by performing:    LE Dressing with Supervision.  Toilet transfer to bedside commode with Moderate Assistance using AD as needed.  Upper extremity exercise program x15 reps per handout, with independence.                     History:     Past Medical History:   Diagnosis Date    DINORAH (acute kidney injury) 2016    Arthritis     CAD in native artery 2019    CHF (congestive heart failure)     Chronic obstructive pulmonary disease 2016    Coronary artery disease involving native coronary artery of native heart without angina pectoris 2016    CRI (chronic renal insufficiency) 2019     donor kidney transplant for DM 16     Induction with Thymo x3 and IV solumedrol to total 875mg  Kidney Biopsy  2016: 16 glomeruli, ACR type 1 AVR type 2, significant microcirculatory changes, c4d negative, No DSA, 5 to10% fibrosis. Treated with thymo x8 2016- no rejection      Diastolic heart failure     Encounter for blood transfusion     ESRD on RRT since 10/2013 10/29/2013    Biopsy proven diabetic nephropathy and lymphoplasmacytic interstitial infiltrate not c/w with AIN (ddx sjogrens or assoc with tamm-horsefall protein extravasation)     GERD (gastroesophageal reflux disease)     History of hepatitis C, s/p successful Rx w/ SVR12 - 2017    Completed 12 weeks  harvoni w/ SVR    Hyperlipidemia     Hypertension     PAD (peripheral artery disease) 7/21/2019    PIC line (peripherally inserted central catheter) flush     Prophylactic immunotherapy     Proteinuria     PVD (peripheral vascular disease) 6/26/2017    RLE BKA CT 12/11/16 Extensive atherosclerotic disease of the aorta and mesenteric arteries.     Renal hypertension     Type 2 diabetes mellitus with diabetic neuropathy, with long-term current use of insulin 12/1/2016    Vitamin B12 deficiency        Past Surgical History:   Procedure Laterality Date    AORTOGRAPHY WITH SERIALOGRAPHY N/A 6/14/2018    Procedure: LEFT LEG ANGIOGRAM;  Surgeon: Donal Mcdonald MD;  Location: [facility] CATH LAB;  Service: Vascular;  Laterality: N/A;    APPLICATION OF LARGE EXTERNAL FIXATION DEVICE TO TIBIA Left 8/4/2022    Procedure: APPLICATION, EXTERNAL FIXATION DEVICE, LARGE, TIBIA;  Surgeon: Good Villa MD;  Location: [facility] OR;  Service: Orthopedics;  Laterality: Left;    av bovine graft      Left UE    AV FISTULA PLACEMENT      left UE    CARDIAC CATHETERIZATION  02/2015    CLOSED REDUCTION OF INJURY OF TIBIA Left 8/4/2022    Procedure: CLOSED REDUCTION, TIBIA;  Surgeon: Good Villa MD;  Location: [facility] OR;  Service: Orthopedics;  Laterality: Left;  Closed reduction left tibial and fibular shaft fractures    CLOSURE OF WOUND Left 9/24/2018    Procedure: CLOSURE, WOUND;  Surgeon: Karla Wheeler DPM;  Location: [facility] OR;  Service: Podiatry;  Laterality: Left;  Secondary Wound closure, extensive    CLOSURE OF WOUND Left 11/5/2018    Procedure: CLOSURE, WOUND;  Surgeon: Karla Wheeler DPM;  Location: [facility] OR;  Service: Podiatry;  Laterality: Left;    DEBRIDEMENT OF MULTIPLE METATARSAL BONES Left 11/5/2018    Procedure: DEBRIDEMENT, METATARSAL BONE, 2 OR MORE BONES;  Surgeon: Karla Wheeler DPM;  Location: [facility] OR;  Service: Podiatry;  Laterality: Left;    EXCISION OF SKIN Left 9/27/2019    Procedure:  EXCISION, SKIN;  Surgeon: Lenard Alarcon MD;  Location: [facility] OR 2ND FLR;  Service: Plastics;  Laterality: Left;  Plastics set, NIMS monitor, ACell    FOOT AMPUTATION THROUGH METATARSAL Left 9/21/2018    Procedure: AMPUTATION, FOOT, TRANSMETATARSAL;  Surgeon: Karla Wheeler DPM;  Location: [facility] OR;  Service: Podiatry;  Laterality: Left;    FOOT AMPUTATION THROUGH METATARSAL Left 10/31/2018    Procedure: AMPUTATION, FOOT, TRANSMETATARSAL;  Surgeon: Karla Wheeler DPM;  Location: [facility] OR;  Service: Podiatry;  Laterality: Left;    FOOT AMPUTATION THROUGH METATARSAL Left 11/5/2018    Procedure: AMPUTATION, FOOT, TRANSMETATARSAL;  Surgeon: Karla Wheeler DPM;  Location: [facility] OR;  Service: Podiatry;  Laterality: Left;  revisional transmetatarsal amputation, Left foot    IRRIGATION AND DEBRIDEMENT OF LOWER EXTREMITY Left 10/31/2018    Procedure: IRRIGATION AND DEBRIDEMENT, LOWER EXTREMITY;  Surgeon: Karla Wheeler DPM;  Location: [facility] OR;  Service: Podiatry;  Laterality: Left;    KIDNEY TRANSPLANT  05/21/2016    LEFT HEART CATHETERIZATION Left 7/21/2019    Procedure: CATHETERIZATION, HEART, LEFT;  Surgeon: Andrew Valdez MD;  Location: [facility] CATH LAB;  Service: Cardiology;  Laterality: Left;    LEG AMPUTATION THROUGH KNEE  2011    right LE, started as nail puncture leading to diabetic ulcer    SKIN FULL THICKNESS GRAFT Left 10/7/2019    Procedure: APPLICATION, GRAFT, SKIN, FULL-THICKNESS;  Surgeon: Lenard Alarcon MD;  Location: [facility] OR [facility] FLR;  Service: Plastics;  Laterality: Left;    SURGICAL REMOVAL OF LESION OF FACE Right 10/7/2019    Procedure: EXCISION, LESION, FACE;  Surgeon: Lenard Alarcon MD;  Location: [facility] OR [facility] FLR;  Service: Plastics;  Laterality: Right;       Time Tracking:     OT Date of Treatment: 08/05/22  OT Start Time: 0900  OT Stop Time: 0923  OT Total Time (min): 23 min    Billable Minutes:Evaluation 15 minutes  Therapeutic Activity 8 minutes    8/5/2022   Yuli  Juanito, OT

## 2022-08-05 NOTE — ASSESSMENT & PLAN NOTE
Will unroof/debrided the eschar at the bedside tomorrow under local anesthetic.  This was discussed with patient.  Risks benefits and complications were discussed.    Patient will have his Eliquis to hold till after debridement

## 2022-08-05 NOTE — HOSPITAL COURSE
HPI: Patient is a 67 y.o.  male with a PMHx of HTN, CAD, DM, PVD, kidney transplant, COPD, right BKA, left transmetatarsal amputation who presents to the Emergency Department for evaluation of a fall which onset suddenly 5 hours ago. Patient states he was trying to get into his wheel chair when he slipped and fell. He reported not being able to put weight on it and laid on the ground for 4-5 hours before calling ems. Patient denied any issues with anesthesia with prior surgeries. Currently complains of pain and nausea. Otherwise denies any other issues.      In the ED, ankle xray showed distal tib fib fracture. Ortho notified and plans to operate later this afternoon. Patient was admitted to .     The patient was operated on by the orthopedic service.  He was noted to have a pressure ulcer that is unstageable due to a dry eschar and mild erythema surrounding it on the left hip.  The general surgical service was asked to see him as the wound care nurse felt that the pressure ulcer should be unroof.  The pressure ulcer is approximately 6 x 2 cm in size    08/06/2022 patient is unchanged.  Will proceed with debridement of hip.  Okay to start anticoagulation this afternoon    08/07/2022.  And changed.  Wound is clean.  Jaime fermin.  Will need to follow up with surgery in about 4 weeks.

## 2022-08-05 NOTE — PT/OT/SLP EVAL
Physical Therapy Evaluation    Patient Name:  Lavelle Ladd   MRN:  [id number]    Recommendations:     Discharge Recommendations:  nursing facility, skilled   Discharge Equipment Recommendations: slide board   Barriers to discharge: Decreased caregiver support    Assessment:     Lavelle Ladd is a 67 y.o. male admitted with a medical diagnosis of Closed fracture of left tibia and fibula.  He presents with the following impairments/functional limitations:  weakness, impaired endurance, impaired self care skills, impaired functional mobility, decreased coordination, impaired balance, decreased upper extremity function, decreased lower extremity function, pain, impaired skin, orthopedic precautions, decreased safety awareness, decreased ROM .    Rehab Prognosis: Good; patient would benefit from acute skilled PT services to address these deficits and reach maximum level of function.    Recent Surgery: Procedure(s) (LRB):  APPLICATION, EXTERNAL FIXATION DEVICE, LARGE, TIBIA (Left)  CLOSED REDUCTION, TIBIA (Left) 1 Day Post-Op    Plan:     During this hospitalization, patient to be seen 3 x/week to address the identified rehab impairments via therapeutic activities, therapeutic exercises, wheelchair management/training and progress toward the following goals:    · Plan of Care Expires:  08/19/22    Subjective     Chief Complaint: PAIN L LE   Patient/Family Comments/goals: NONE STATED   Pain/Comfort:  · Pain Rating 1: 6/10  · Location - Side 1: Left  · Location 1: leg  · Pain Rating Post-Intervention 1: 6/10    Patients cultural, spiritual, Rastafarian conflicts given the current situation:      Living Environment:   PT LIVES AT HOME WITH FRIEND IN A ONE STORY HOME WITH RAMP TO ENTER HOME   Prior to admission, patients level of function was WC BOUND AND STAYS IN BED A LOT .  Equipment used at home: wheelchair, bedside commode, grab bar, shower chair.  DME owned (not currently used): [employer].  Upon discharge, patient  "will have assistance from UNKNOWN .    Objective:     Communicated with NURSE VARGHESE AND Epic CHART REVIEW  prior to session.  Patient found supine with peripheral IV, telemetry, external fixator, bed alarm  upon PT entry to room.    General Precautions: Standard, fall   Orthopedic Precautions:LLE non weight bearing   Braces: N/A  Respiratory Status: Nasal cannula, flow 2 L/min    Exams:  · RLE ROM: WFL  · RLE Strength: IMPAIRED  · LLE ROM: LIMITED  · LLE Strength: NA    Functional Mobility:  · Bed Mobility:     · Rolling Left:  stand by assistance  · Supine to Sit: stand by assistance  · Sit to Supine: stand by assistance    Therapeutic Activities and Exercises:   PT NEEDED INC ENCOURAGEMENT TO PARTICIPATE IN EVAL. PT SEATED EOB X APPROX 5 MIN. PT EDUCATED ON USE OF SLIDE BOARD AS GOAL FOR T/F TRAINING. PT ALSO EDUCATED ON ROLE OF P.T. AND REC FOR CONT P.T. WHEN D/C FROM HOSPITAL. PT APPEARED IRRITABLE HOWEVER AGREED TO TRY AS HE SAY, " HE KNEE HE NEEDED TO DO IT."     AM-PAC 6 CLICK MOBILITY  Total Score:12     Patient left HOB elevated with all lines intact and call button in reach.    GOALS:   Multidisciplinary Problems     Physical Therapy Goals        Problem: Physical Therapy    Goal Priority Disciplines Outcome Goal Variances Interventions   Physical Therapy Goal     PT, PT/OT      Description: LT22  1. PT WILL COMPLETE BED MOBILITY IND  2. PT WILL SCOOT FOR T/F TRAINING NWB  B LE WITH SBA IN BED  3. PT WILL COMPLETE SLIDE BOARD T/F TO WC WITH MAX A>MODA                   History:     Past Medical History:   Diagnosis Date    DINORAH (acute kidney injury) 2016    Arthritis     CAD in native artery 2019    CHF (congestive heart failure)     Chronic obstructive pulmonary disease 2016    Coronary artery disease involving native coronary artery of native heart without angina pectoris 2016    CRI (chronic renal insufficiency) 2019     donor kidney transplant for DM " 5/21/16     Induction with Thymo x3 and IV solumedrol to total 875mg  Kidney Biopsy  5/31/2016: 16 glomeruli, ACR type 1 AVR type 2, significant microcirculatory changes, c4d negative, No DSA, 5 to10% fibrosis. Treated with thymo x8 6/21/2016- no rejection      Diastolic heart failure     Encounter for blood transfusion     ESRD on RRT since 10/2013 10/29/2013    Biopsy proven diabetic nephropathy and lymphoplasmacytic interstitial infiltrate not c/w with AIN (ddx sjogrens or assoc with tamm-horsefall protein extravasation)     GERD (gastroesophageal reflux disease)     History of hepatitis C, s/p successful Rx w/ SVR12 - 4/2017 4/5/2017    Completed 12 weeks harvoni w/ SVR    Hyperlipidemia     Hypertension     PAD (peripheral artery disease) 7/21/2019    PIC line (peripherally inserted central catheter) flush     Prophylactic immunotherapy     Proteinuria     PVD (peripheral vascular disease) 6/26/2017    RLE BKA CT 12/11/16 Extensive atherosclerotic disease of the aorta and mesenteric arteries.     Renal hypertension     Type 2 diabetes mellitus with diabetic neuropathy, with long-term current use of insulin 12/1/2016    Vitamin B12 deficiency        Past Surgical History:   Procedure Laterality Date    AORTOGRAPHY WITH SERIALOGRAPHY N/A 6/14/2018    Procedure: LEFT LEG ANGIOGRAM;  Surgeon: Donal Mcdonald MD;  Location: [facility] CATH LAB;  Service: Vascular;  Laterality: N/A;    APPLICATION OF LARGE EXTERNAL FIXATION DEVICE TO TIBIA Left 8/4/2022    Procedure: APPLICATION, EXTERNAL FIXATION DEVICE, LARGE, TIBIA;  Surgeon: Good Villa MD;  Location: [facility] OR;  Service: Orthopedics;  Laterality: Left;    av bovine graft      Left UE    AV FISTULA PLACEMENT      left UE    CARDIAC CATHETERIZATION  02/2015    CLOSED REDUCTION OF INJURY OF TIBIA Left 8/4/2022    Procedure: CLOSED REDUCTION, TIBIA;  Surgeon: Good Villa MD;  Location: [facility] OR;  Service: Orthopedics;  Laterality: Left;  Closed  reduction left tibial and fibular shaft fractures    CLOSURE OF WOUND Left 9/24/2018    Procedure: CLOSURE, WOUND;  Surgeon: Karla Wheeler DPM;  Location: [facility] OR;  Service: Podiatry;  Laterality: Left;  Secondary Wound closure, extensive    CLOSURE OF WOUND Left 11/5/2018    Procedure: CLOSURE, WOUND;  Surgeon: Karla Wheeler DPM;  Location: [facility] OR;  Service: Podiatry;  Laterality: Left;    DEBRIDEMENT OF MULTIPLE METATARSAL BONES Left 11/5/2018    Procedure: DEBRIDEMENT, METATARSAL BONE, 2 OR MORE BONES;  Surgeon: Karla Wheeler DPM;  Location: [facility] OR;  Service: Podiatry;  Laterality: Left;    EXCISION OF SKIN Left 9/27/2019    Procedure: EXCISION, SKIN;  Surgeon: Lenard Alarcon MD;  Location: [address];  Service: Plastics;  Laterality: Left;  Plastics set, NIMS monitor, ACell    FOOT AMPUTATION THROUGH METATARSAL Left 9/21/2018    Procedure: AMPUTATION, FOOT, TRANSMETATARSAL;  Surgeon: Karla Wheeler DPM;  Location: [facility] OR;  Service: Podiatry;  Laterality: Left;    FOOT AMPUTATION THROUGH METATARSAL Left 10/31/2018    Procedure: AMPUTATION, FOOT, TRANSMETATARSAL;  Surgeon: Karla Wheeler DPM;  Location: [facility] OR;  Service: Podiatry;  Laterality: Left;    FOOT AMPUTATION THROUGH METATARSAL Left 11/5/2018    Procedure: AMPUTATION, FOOT, TRANSMETATARSAL;  Surgeon: Karla Wheeler DPM;  Location: [facility] OR;  Service: Podiatry;  Laterality: Left;  revisional transmetatarsal amputation, Left foot    IRRIGATION AND DEBRIDEMENT OF LOWER EXTREMITY Left 10/31/2018    Procedure: IRRIGATION AND DEBRIDEMENT, LOWER EXTREMITY;  Surgeon: Karla Wheeler DPM;  Location: [facility] OR;  Service: Podiatry;  Laterality: Left;    KIDNEY TRANSPLANT  05/21/2016    LEFT HEART CATHETERIZATION Left 7/21/2019    Procedure: CATHETERIZATION, HEART, LEFT;  Surgeon: Andrew Valdez MD;  Location: [facility] CATH LAB;  Service: Cardiology;  Laterality: Left;    LEG AMPUTATION THROUGH KNEE  2011    right  LE, started as nail puncture leading to diabetic ulcer    SKIN FULL THICKNESS GRAFT Left 10/7/2019    Procedure: APPLICATION, GRAFT, SKIN, FULL-THICKNESS;  Surgeon: Lenard Alacron MD;  Location: [facility] OR [address] [place] [address];  Service: Plastics;  Laterality: Left;    SURGICAL REMOVAL OF LESION OF FACE Right 10/7/2019    Procedure: EXCISION, LESION, FACE;  Surgeon: Lenard Alarcon MD;  Location: [facility] OR [address] [place] [address];  Service: Plastics;  Laterality: Right;       Time Tracking:     PT Received On: 08/05/22  PT Start Time: 0904     PT Stop Time: 0930  PT Total Time (min): 26 min     Billable Minutes: Evaluation 16 and Therapeutic Activity 10      08/05/2022

## 2022-08-05 NOTE — CONSULTS
[facility] Surg  Wound Care    Patient Name:  Lavelle Ladd   MRN:  [id number]  Date: 2022  Diagnosis: Closed fracture of left tibia and fibula    History:     Past Medical History:   Diagnosis Date    DINORAH (acute kidney injury) 2016    Arthritis     CAD in native artery 2019    CHF (congestive heart failure)     Chronic obstructive pulmonary disease 2016    Coronary artery disease involving native coronary artery of native heart without angina pectoris 2016    CRI (chronic renal insufficiency) 2019     donor kidney transplant for DM 16     Induction with Thymo x3 and IV solumedrol to total 875mg  Kidney Biopsy  2016: 16 glomeruli, ACR type 1 AVR type 2, significant microcirculatory changes, c4d negative, No DSA, 5 to10% fibrosis. Treated with thymo x8 2016- no rejection      Diastolic heart failure     Encounter for blood transfusion     ESRD on RRT since 10/2013 10/29/2013    Biopsy proven diabetic nephropathy and lymphoplasmacytic interstitial infiltrate not c/w with AIN (ddx sjogrens or assoc with tamm-horsefall protein extravasation)     GERD (gastroesophageal reflux disease)     History of hepatitis C, s/p successful Rx w/ SVR12 - 2017    Completed 12 weeks harvoni w/ SVR    Hyperlipidemia     Hypertension     PAD (peripheral artery disease) 2019    PIC line (peripherally inserted central catheter) flush     Prophylactic immunotherapy     Proteinuria     PVD (peripheral vascular disease) 2017    RLE BKA CT 16 Extensive atherosclerotic disease of the aorta and mesenteric arteries.     Renal hypertension     Type 2 diabetes mellitus with diabetic neuropathy, with long-term current use of insulin 2016    Vitamin B12 deficiency        Social History     Socioeconomic History    Marital status: Single   Occupational History    Occupation: Disabled     Employer: DISABLED   Tobacco Use    Smoking status:  Former Smoker     Packs/day: 1.00     Years: 40.00     Pack years: 40.00     Quit date: 2013     Years since quittin.5    Smokeless tobacco: Never Used   Substance and Sexual Activity    Alcohol use: Yes     Alcohol/week: 6.0 standard drinks     Types: 6 Cans of beer per week     Comment: seldom    Drug use: No    Sexual activity: Never   Social History Narrative    Lives alone. Retired from construction and various jobs, now retired. Would like to return to work PT to alleviate boredom.       Precautions:     Allergies as of 2022    (No Known Allergies)       [facility] Assessment Details/Treatment           Consulted to this 67 year old male patient for wound care. PMHx of HTN, CAD, DM, PVD, kidney transplant, COPD, right BKA, left transmetatarsal amputation. He is awake and alert. LLE with leg immobilizer in place. Orders in place per ortho recs for pin sites. Scattered erythema, scabs, and abrasions to his BUE and BLE. Left upper buttock with unstageable pressure injury. Measures 3x7cm, covered with moist tan and black slough. Edges with erythema, periwound is indurated. Foam dressing in place. dakins moistened gauze ordered, hospital medicine notified of possible need for imaging/surgery consult. Will follow.     2022

## 2022-08-05 NOTE — SUBJECTIVE & OBJECTIVE
Past Medical History:   Diagnosis Date    DINORAH (acute kidney injury) 2016    Arthritis     CAD in native artery 2019    CHF (congestive heart failure)     Chronic obstructive pulmonary disease 2016    Coronary artery disease involving native coronary artery of native heart without angina pectoris 2016    CRI (chronic renal insufficiency) 2019     donor kidney transplant for DM 16     Induction with Thymo x3 and IV solumedrol to total 875mg  Kidney Biopsy  2016: 16 glomeruli, ACR type 1 AVR type 2, significant microcirculatory changes, c4d negative, No DSA, 5 to10% fibrosis. Treated with thymo x8 2016- no rejection      Diastolic heart failure     Encounter for blood transfusion     ESRD on RRT since 10/2013 10/29/2013    Biopsy proven diabetic nephropathy and lymphoplasmacytic interstitial infiltrate not c/w with AIN (ddx sjogrens or assoc with tamm-horsefall protein extravasation)     GERD (gastroesophageal reflux disease)     History of hepatitis C, s/p successful Rx w/ SVR12 - 2017    Completed 12 weeks harvoni w/ SVR    Hyperlipidemia     Hypertension     PAD (peripheral artery disease) 2019    PIC line (peripherally inserted central catheter) flush     Prophylactic immunotherapy     Proteinuria     PVD (peripheral vascular disease) 2017    RLE BKA CT 16 Extensive atherosclerotic disease of the aorta and mesenteric arteries.     Renal hypertension     Type 2 diabetes mellitus with diabetic neuropathy, with long-term current use of insulin 2016    Vitamin B12 deficiency        Past Surgical History:   Procedure Laterality Date    AORTOGRAPHY WITH SERIALOGRAPHY N/A 2018    Procedure: LEFT LEG ANGIOGRAM;  Surgeon: Donal Mcdonald MD;  Location: [facility] CATH LAB;  Service: Vascular;  Laterality: N/A;    APPLICATION OF LARGE EXTERNAL FIXATION DEVICE TO TIBIA Left 2022    Procedure: APPLICATION, EXTERNAL FIXATION DEVICE, LARGE, TIBIA;   Surgeon: Good Villa MD;  Location: [facility] OR;  Service: Orthopedics;  Laterality: Left;    av bovine graft      Left UE    AV FISTULA PLACEMENT      left UE    CARDIAC CATHETERIZATION  02/2015    CLOSED REDUCTION OF INJURY OF TIBIA Left 8/4/2022    Procedure: CLOSED REDUCTION, TIBIA;  Surgeon: Good Villa MD;  Location: [facility] OR;  Service: Orthopedics;  Laterality: Left;  Closed reduction left tibial and fibular shaft fractures    CLOSURE OF WOUND Left 9/24/2018    Procedure: CLOSURE, WOUND;  Surgeon: Karla Wheeler DPM;  Location: [facility] OR;  Service: Podiatry;  Laterality: Left;  Secondary Wound closure, extensive    CLOSURE OF WOUND Left 11/5/2018    Procedure: CLOSURE, WOUND;  Surgeon: Karla Wheeler DPM;  Location: [facility] OR;  Service: Podiatry;  Laterality: Left;    DEBRIDEMENT OF MULTIPLE METATARSAL BONES Left 11/5/2018    Procedure: DEBRIDEMENT, METATARSAL BONE, 2 OR MORE BONES;  Surgeon: Karla Wheeler DPM;  Location: [facility] OR;  Service: Podiatry;  Laterality: Left;    EXCISION OF SKIN Left 9/27/2019    Procedure: EXCISION, SKIN;  Surgeon: Lenard Alarcon MD;  Location: [address];  Service: Plastics;  Laterality: Left;  Plastics set, NIMS monitor, ACell    FOOT AMPUTATION THROUGH METATARSAL Left 9/21/2018    Procedure: AMPUTATION, FOOT, TRANSMETATARSAL;  Surgeon: Karla Wheeler DPM;  Location: [facility] OR;  Service: Podiatry;  Laterality: Left;    FOOT AMPUTATION THROUGH METATARSAL Left 10/31/2018    Procedure: AMPUTATION, FOOT, TRANSMETATARSAL;  Surgeon: Karla Wheeler DPM;  Location: [facility] OR;  Service: Podiatry;  Laterality: Left;    FOOT AMPUTATION THROUGH METATARSAL Left 11/5/2018    Procedure: AMPUTATION, FOOT, TRANSMETATARSAL;  Surgeon: Karla Wheeler DPM;  Location: [facility] OR;  Service: Podiatry;  Laterality: Left;  revisional transmetatarsal amputation, Left foot    IRRIGATION AND DEBRIDEMENT OF LOWER EXTREMITY Left 10/31/2018    Procedure: IRRIGATION AND  DEBRIDEMENT, LOWER EXTREMITY;  Surgeon: Karla Wheeler DPM;  Location: [facility] OR;  Service: Podiatry;  Laterality: Left;    KIDNEY TRANSPLANT  05/21/2016    LEFT HEART CATHETERIZATION Left 7/21/2019    Procedure: CATHETERIZATION, HEART, LEFT;  Surgeon: Andrew Valdez MD;  Location: [facility] CATH LAB;  Service: Cardiology;  Laterality: Left;    LEG AMPUTATION THROUGH KNEE  2011    right LE, started as nail puncture leading to diabetic ulcer    SKIN FULL THICKNESS GRAFT Left 10/7/2019    Procedure: APPLICATION, GRAFT, SKIN, FULL-THICKNESS;  Surgeon: Lenard Alarcon MD;  Location: [address];  Service: Plastics;  Laterality: Left;    SURGICAL REMOVAL OF LESION OF FACE Right 10/7/2019    Procedure: EXCISION, LESION, FACE;  Surgeon: Lenard Alarcon MD;  Location: [facility] OR [address] [place] [address];  Service: Plastics;  Laterality: Right;       Review of patient's allergies indicates:  No Known Allergies    No current facility-administered medications on file prior to encounter.     Current Outpatient Medications on File Prior to Encounter   Medication Sig    amLODIPine (NORVASC) 10 MG tablet Take 1 tablet (10 mg total) by mouth once daily.    aspirin (ECOTRIN) 81 MG EC tablet Take 1 tablet (81 mg total) by mouth once daily.    atorvastatin (LIPITOR) 80 MG tablet Take 1 tablet (80 mg total) by mouth once daily.    clopidogreL (PLAVIX) 75 mg tablet Take 1 tablet (75 mg total) by mouth once daily.    ergocalciferol (ERGOCALCIFEROL) 50,000 unit Cap Take 1 capsule (50,000 Units total) by mouth every 7 days. Take on Mondays    furosemide (LASIX) 40 MG tablet Take 1 tablet (40 mg total) by mouth daily as needed (Leg swelling, Nocturnal SOB).    gabapentin (NEURONTIN) 300 MG capsule Take 300 mg by mouth 3 (three) times daily.    HYDROcodone-acetaminophen (NORCO)  mg per tablet 1 tablet by Per G Tube route every 6 (six) hours as needed for Pain.    hydrocortisone 2.5 % cream Apply topically 2 (two) times daily.    insulin aspart U-100  (NOVOLOG) 100 unit/mL (3 mL) InPn pen Inject 5 units three times a day with meal if BG > 300.    levalbuterol (XOPENEX) 1.25 mg/3 mL nebulizer solution Take 3 mLs (1.25 mg total) by nebulization every 6 to 8 hours as needed for Wheezing or Shortness of Breath.    LIDOcaine (LIDODERM) 5 % Place 1 patch onto the skin daily as needed. 12 hours on and 12 hours off    metoprolol tartrate (LOPRESSOR) 25 MG tablet Take 1 tablet (25 mg total) by mouth 2 (two) times daily.    mycophenolate (CELLCEPT) 250 mg Cap Take 1 capsule (250 mg total) by mouth 2 (two) times daily.    nitroGLYCERIN (NITROSTAT) 0.4 MG SL tablet Place 1 tablet (0.4 mg total) under the tongue every 5 (five) minutes as needed.    ondansetron (ZOFRAN-ODT) 4 MG TbDL Take 1 tablet (4 mg total) by mouth every 8 (eight) hours as needed.    sevelamer carbonate (RENVELA) 800 mg Tab Take 800 mg by mouth 3 (three) times daily with meals.    tacrolimus (PROGRAF) 0.5 MG Cap Take 2 capsules (1 mg total) by mouth every 12 (twelve) hours.    cloNIDine (CATAPRES) 0.1 MG tablet Take 1 tablet (0.1 mg total) by mouth 3 (three) times daily. (Patient not taking: Reported on 8/4/2022)    flash glucose scanning reader (FREESTYLE BRYAN 14 DAY READER) Misc 1 Device by Misc.(Non-Drug; Combo Route) route as needed.    flash glucose sensor (FREESTYLE BRYAN 14 DAY SENSOR) Kit 1 kit by Misc.(Non-Drug; Combo Route) route every 14 (fourteen) days.    ipratropium (ATROVENT) 0.02 % nebulizer solution Take 2.5 mLs (500 mcg total) by nebulization 4 (four) times daily. (Patient not taking: Reported on 8/4/2022)    isosorbide mononitrate (IMDUR) 30 MG 24 hr tablet Take 2 tablets (60 mg total) by mouth once daily. (Patient not taking: Reported on 8/4/2022)    ketoconazole (NIZORAL) 2 % cream Apply topically 2 (two) times daily.    linaCLOtide (LINZESS) 72 mcg Cap capsule Take 1 capsule (72 mcg total) by mouth before breakfast.    naloxone (NARCAN) 4 mg/actuation Spry 1 spray by Nasal route  once.    semaglutide (OZEMPIC) 1 mg/dose (2 mg/1.5 mL) PnIj Inject 1 mg under the skin every 7 days. (Patient taking differently: ())    sertraline (ZOLOFT) 25 MG tablet Take 1 tablet (25 mg total) by mouth once daily. For depression and anxiety (Patient not taking: Reported on 2022)    VIOS AEROSOL DELIVERY SYSTEM [place] USE Q 4 H PRN     Family History       Problem Relation (Age of Onset)    Cancer Father    Diabetes Father    Heart failure Father, Mother    Stroke Father          Tobacco Use    Smoking status: Former Smoker     Packs/day: 1.00     Years: 40.00     Pack years: 40.00     Quit date: 2013     Years since quittin.5    Smokeless tobacco: Never Used   Substance and Sexual Activity    Alcohol use: Yes     Alcohol/week: 6.0 standard drinks     Types: 6 Cans of beer per week     Comment: seldom    Drug use: No    Sexual activity: Never     Review of Systems   Constitutional:  Negative for fatigue and fever.   HENT:  Negative for sinus pressure.    Eyes:  Negative for visual disturbance.   Respiratory:  Negative for shortness of breath.    Cardiovascular:  Negative for chest pain.   Gastrointestinal:  Negative for vomiting.   Genitourinary:  Negative for difficulty urinating.   Musculoskeletal:  Positive for gait problem and joint swelling. Negative for back pain.   Skin:  Negative for rash.   Neurological:  Negative for headaches.   Psychiatric/Behavioral:  Negative for confusion.    Objective:     Vital Signs (Most Recent):  Temp: 98.8 °F (37.1 °C) (22 1206)  Pulse: 67 (22 1206)  Resp: 16 (22 1425)  BP: 134/74 (22 1206)  SpO2: (!) 94 % (22 1206)   Vital Signs (24h Range):  Temp:  [98.1 °F (36.7 °C)-98.9 °F (37.2 °C)] 98.8 °F (37.1 °C)  Pulse:  [67-77] 67  Resp:  [14-19] 16  SpO2:  [94 %-96 %] 94 %  BP: (134-183)/(64-99) 134/74     Weight: 105.7 kg (233 lb 0.4 oz)  Body mass index is 32.5 kg/m².    Physical Exam  Constitutional:       General: He is not in  acute distress.     Appearance: He is well-developed. He is obese. He is not diaphoretic.   HENT:      Head: Normocephalic and atraumatic.   Eyes:      Pupils: Pupils are equal, round, and reactive to light.   Cardiovascular:      Rate and Rhythm: Normal rate and regular rhythm.      Heart sounds: Normal heart sounds. No murmur heard.    No friction rub. No gallop.   Pulmonary:      Effort: Pulmonary effort is normal. No respiratory distress.      Breath sounds: Normal breath sounds. No stridor. No wheezing or rales.   Abdominal:      General: Bowel sounds are normal. There is no distension.      Palpations: Abdomen is soft. There is no mass.      Tenderness: There is no abdominal tenderness. There is no guarding.   Musculoskeletal:      Comments: Right bka, left transmetatarsal amputation   Skin:     General: Skin is warm.      Findings: No erythema.   Neurological:      Mental Status: He is alert and oriented to person, place, and time.         CRANIAL NERVES     CN III, IV, VI   Pupils are equal, round, and reactive to light.     Significant Labs:   Results for orders placed or performed during the hospital encounter of 08/04/22   CBC auto differential   Result Value Ref Range    WBC 5.41 3.90 - 12.70 K/uL    RBC 4.28 (L) 4.60 - 6.20 M/uL    Hemoglobin 13.0 (L) 14.0 - 18.0 g/dL    Hematocrit 42.6 40.0 - 54.0 %     (H) 82 - 98 fL    MCH 30.4 27.0 - 31.0 pg    MCHC 30.5 (L) 32.0 - 36.0 g/dL    RDW 13.5 11.5 - 14.5 %    Platelets 201 150 - 450 K/uL    MPV 9.2 9.2 - 12.9 fL    Immature Granulocytes 0.4 0.0 - 0.5 %    Gran # (ANC) 3.7 1.8 - 7.7 K/uL    Immature Grans (Abs) 0.02 0.00 - 0.04 K/uL    Lymph # 1.0 1.0 - 4.8 K/uL    Mono # 0.6 0.3 - 1.0 K/uL    Eos # 0.0 0.0 - 0.5 K/uL    Baso # 0.02 0.00 - 0.20 K/uL    nRBC 0 0 /100 WBC    Gran % 69.1 38.0 - 73.0 %    Lymph % 18.1 18.0 - 48.0 %    Mono % 11.8 4.0 - 15.0 %    Eosinophil % 0.2 0.0 - 8.0 %    Basophil % 0.4 0.0 - 1.9 %    Differential Method Automated     Comprehensive metabolic panel   Result Value Ref Range    Sodium 136 136 - 145 mmol/L    Potassium 4.9 3.5 - 5.1 mmol/L    Chloride 102 95 - 110 mmol/L    CO2 24 23 - 29 mmol/L    Glucose 250 (H) 70 - 110 mg/dL    BUN 30 (H) 8 - 23 mg/dL    Creatinine 1.8 (H) 0.5 - 1.4 mg/dL    Calcium 8.4 (L) 8.7 - 10.5 mg/dL    Total Protein 6.7 6.0 - 8.4 g/dL    Albumin 3.0 (L) 3.5 - 5.2 g/dL    Total Bilirubin 0.5 0.1 - 1.0 mg/dL    Alkaline Phosphatase 156 (H) 55 - 135 U/L     (H) 10 - 40 U/L    ALT 73 (H) 10 - 44 U/L    Anion Gap 10 8 - 16 mmol/L    eGFR 40 (A) >60 mL/min/1.73 m^2   COVID-19 Rapid Screening   Result Value Ref Range    SARS-CoV-2 RNA, Amplification, Qual Negative Negative   Hemoglobin A1c if not done in past 3 months   Result Value Ref Range    Hemoglobin A1C 7.4 (H) 4.0 - 5.6 %    Estimated Avg Glucose 166 (H) 68 - 131 mg/dL   CBC Auto Differential   Result Value Ref Range    WBC 4.67 3.90 - 12.70 K/uL    RBC 3.76 (L) 4.60 - 6.20 M/uL    Hemoglobin 11.7 (L) 14.0 - 18.0 g/dL    Hematocrit 37.4 (L) 40.0 - 54.0 %     (H) 82 - 98 fL    MCH 31.1 (H) 27.0 - 31.0 pg    MCHC 31.3 (L) 32.0 - 36.0 g/dL    RDW 13.4 11.5 - 14.5 %    Platelets 155 150 - 450 K/uL    MPV 9.4 9.2 - 12.9 fL    Immature Granulocytes 0.2 0.0 - 0.5 %    Gran # (ANC) 3.0 1.8 - 7.7 K/uL    Immature Grans (Abs) 0.01 0.00 - 0.04 K/uL    Lymph # 1.0 1.0 - 4.8 K/uL    Mono # 0.7 0.3 - 1.0 K/uL    Eos # 0.0 0.0 - 0.5 K/uL    Baso # 0.01 0.00 - 0.20 K/uL    nRBC 0 0 /100 WBC    Gran % 64.5 38.0 - 73.0 %    Lymph % 21.0 18.0 - 48.0 %    Mono % 13.9 4.0 - 15.0 %    Eosinophil % 0.2 0.0 - 8.0 %    Basophil % 0.2 0.0 - 1.9 %    Differential Method Automated    Basic Metabolic Panel   Result Value Ref Range    Sodium 135 (L) 136 - 145 mmol/L    Potassium 4.2 3.5 - 5.1 mmol/L    Chloride 105 95 - 110 mmol/L    CO2 21 (L) 23 - 29 mmol/L    Glucose 159 (H) 70 - 110 mg/dL    BUN 26 (H) 8 - 23 mg/dL    Creatinine 1.5 (H) 0.5 - 1.4 mg/dL    Calcium  7.7 (L) 8.7 - 10.5 mg/dL    Anion Gap 9 8 - 16 mmol/L    eGFR 50 (A) >60 mL/min/1.73 m^2   Magnesium   Result Value Ref Range    Magnesium 1.8 1.6 - 2.6 mg/dL   Tacrolimus level   Result Value Ref Range    Tacrolimus Lvl 2.0 (L) 5.0 - 15.0 ng/mL   Echo Saline Bubble? No   Result Value Ref Range    BSA 2.27 m2    TDI SEPTAL 0.09 m/s    LV LATERAL E/E' RATIO 8.45 m/s    LV SEPTAL E/E' RATIO 10.33 m/s    LA WIDTH 3.60 cm    IVC diameter 1.30 cm    Left Ventricular Outflow Tract Mean Velocity 0.62988861615 cm/s    Left Ventricular Outflow Tract Mean Gradient 2.13 mmHg    TDI LATERAL 0.11 m/s    LVIDd 3.72 3.5 - 6.0 cm    IVS 1.59 (A) 0.6 - 1.1 cm    Posterior Wall 1.78 (A) 0.6 - 1.1 cm    Ao root annulus 3.36 cm    LVIDs 2.45 2.1 - 4.0 cm    FS 34 28 - 44 %    LA volume 57.29 cm3    Sinus 3.49 cm    STJ 3.49 cm    Ascending aorta 3.28 cm    LV mass 254.30 g    LA size 3.62 cm    Left Ventricle Relative Wall Thickness 0.96 cm    AV mean gradient 3 mmHg    AV valve area 2.80 cm2    AV Velocity Ratio 0.75     AV index (prosthetic) 0.75     MV valve area p 1/2 method 3.53 cm2    E/A ratio 0.81     Mean e' 0.10 m/s    E wave deceleration time [id number] msec    IVRT 82.588436441 msec    LVOT diameter 2.18 cm    LVOT area 3.7 cm2    LVOT peak honorio 0.84 m/s    LVOT peak VTI 18.60 cm    Ao peak honorio 1.12 m/s    Ao VTI 24.8 cm    RVOT peak honorio 0.91 m/s    RVOT peak VTI 18.1 cm    LVOT stroke volume 69.39 cm3    AV peak gradient 5 mmHg    PV mean gradient 2.63 mmHg    E/E' ratio 9.30 m/s    MV Peak E Honorio 0.93 m/s    TR Max Honorio 2.53 m/s    MV stenosis pressure 1/2 time [id number] ms    MV Peak A Honorio 1.15 m/s    LV Systolic Volume 21.24 mL    LV Systolic Volume Index 9.6 mL/m2    LV Diastolic Volume 58.87 mL    LV Diastolic Volume Index 26.52 mL/m2    LA Volume Index 25.8 mL/m2    LV Mass Index 115 g/m2    RA Major Axis 4.42 cm    Left Atrium Minor Axis 4.79 cm    Left Atrium Major Axis 5.62 cm    Triscuspid Valve  Regurgitation Peak Gradient 26 mmHg    RA Width 2.57 cm    Right Atrial Pressure (from IVC) 3 mmHg    EF 60 %    TV rest pulmonary artery pressure 29 mmHg   POCT glucose   Result Value Ref Range    POCT Glucose 263 (H) 70 - 110 mg/dL   POCT glucose   Result Value Ref Range    POCT Glucose 264 (H) 70 - 110 mg/dL   POCT glucose   Result Value Ref Range    POCT Glucose 192 (H) 70 - 110 mg/dL   POCT glucose   Result Value Ref Range    POCT Glucose 210 (H) 70 - 110 mg/dL   POCT glucose   Result Value Ref Range    POCT Glucose 157 (H) 70 - 110 mg/dL   POCT glucose   Result Value Ref Range    POCT Glucose 178 (H) 70 - 110 mg/dL   POCT glucose   Result Value Ref Range    POCT Glucose 193 (H) 70 - 110 mg/dL     *Note: Due to a large number of results and/or encounters for the requested time period, some results have not been displayed. A complete set of results can be found in Results Review.        Significant Imaging: X-Ray Ankle Complete Left  Narrative: EXAMINATION:  XR ANKLE COMPLETE 3 VIEW LEFT    CLINICAL HISTORY:  intraop;    COMPARISON:  None  Impression: FINDINGS/  Intraoperative fluoroscopic images were obtained.    Total fluoro time was 1.4 minute and 2 fluoroscopic images were obtained.  See operative report.    Electronically signed by: Ryan Joya MD  Date:    08/04/2022  Time:    15:55  SURG FL Surgery Fluoro Usage  See OP Notes for results.     IMPRESSION: See OP Notes for results.     This procedure was auto-finalized by: Virtual Radi[employer]  Echo Saline Bubble? No  · The left ventricle is normal in size with moderate concentric   hypertrophy and normal systolic function.  · The estimated ejection fraction is 60%.  · Normal left ventricular diastolic function.  · Normal right ventricular size with normal right ventricular systolic   function.  · Mild tricuspid regurgitation.  · Normal central venous pressure (3 mmHg).  · The estimated PA systolic pressure is 29 mmHg.     X-Ray Chest AP  Portable  Narrative: EXAMINATION:  XR CHEST AP PORTABLE    CLINICAL HISTORY:  Encounter for other preprocedural examination    FINDINGS:  Single view of the chest.  08/15/2021 comparison    Cardiac silhouette is normal.  Aorta demonstrates atherosclerotic disease. The lungs demonstrate no evidence of active disease.  Mild bibasilar atelectasis.  No evidence of pleural effusion or pneumothorax.  Bones appear intact demonstrate scattered degenerative change.  Impression: No acute process seen.    Electronically signed by: Ryan Joya MD  Date:    08/04/2022  Time:    07:14  X-Ray Tibia Fibula 2 View Left  Narrative: EXAMINATION:  XR TIBIA FIBULA 2 VIEW LEFT; XR ANKLE COMPLETE 3 VIEW LEFT    CLINICAL HISTORY:  XR TIBIA FIBULA 2 VIEW LEFT; XR ANKLE COMPLETE 3 VIEW LEFTUnspecified fall, initial encounter    COMPARISON:  None    FINDINGS:  Four views of the left tibia/fibula and three views of the left ankle were obtained.    Comminuted tibial metaphyseal fracture with angulation and mild displacement.  There is extension to the medial articular surface and involvement of the interosseous membrane between the tibia and fibula.  Transverse fracture of the distal fibula above the lateral malleolus with mild displacement and angulation.  Mildly comminuted proximal fibular fracture.  Moderate joint effusion of the ankle.  Small avulsion injury at the tip of the medial malleolus    Diffuse osteopenia and moderate soft tissue swelling.  Dense vascular calcifications.  Moderate degenerative changes of the ankle and knee joints.  Advanced degenerative changes within the midfoot.  Large plantar calcaneal spur.  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  Impression: See above.    Electronically signed by: Ryan Joya MD  Date:    08/04/2022  Time:    06:59  X-Ray Ankle Complete Left  Narrative: EXAMINATION:  XR TIBIA FIBULA 2 VIEW LEFT; XR ANKLE COMPLETE 3 VIEW LEFT    CLINICAL  HISTORY:  XR TIBIA FIBULA 2 VIEW LEFT; XR ANKLE COMPLETE 3 VIEW LEFTUnspecified fall, initial encounter    COMPARISON:  None    FINDINGS:  Four views of the left tibia/fibula and three views of the left ankle were obtained.    Comminuted tibial metaphyseal fracture with angulation and mild displacement.  There is extension to the medial articular surface and involvement of the interosseous membrane between the tibia and fibula.  Transverse fracture of the distal fibula above the lateral malleolus with mild displacement and angulation.  Mildly comminuted proximal fibular fracture.  Moderate joint effusion of the ankle.  Small avulsion injury at the tip of the medial malleolus    Diffuse osteopenia and moderate soft tissue swelling.  Dense vascular calcifications.  Moderate degenerative changes of the ankle and knee joints.  Advanced degenerative changes within the midfoot.  Large plantar calcaneal spur.  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  Impression: See above.    Electronically signed by: Ryan Joya MD  Date:    08/04/2022  Time:    06:59

## 2022-08-05 NOTE — PROGRESS NOTES
[facility]  Orthopedics  Progress Note    Patient Name: Lavelle Ladd  MRN: [id number]  Admission Date: 8/4/2022  Hospital Length of Stay: 1 days  Attending Provider: Chapin Proctor MD  Primary Care Provider: Yahir Calzada MD  Follow-up For: Procedure(s) (LRB):  APPLICATION, EXTERNAL FIXATION DEVICE, LARGE, TIBIA (Left)    Post-Operative Day: 1 Day Post-Op  Subjective:     Principal Problem:Closed fracture of left tibia and fibula    Principal Orthopedic Problem: Closed fracture of left tibia and fibula    Interval History: Lavelle Ladd is 67-year-old male postop day 1 status post closed reduction left tibial and fibular shaft fractures and application of external fixation device.  Patient is resting comfortably in bed.  No acute events overnight    Review of patient's allergies indicates:  No Known Allergies    Current Facility-Administered Medications   Medication    0.9%  NaCl infusion    acetaminophen tablet 650 mg    albuterol-ipratropium 2.5 mg-0.5 mg/3 mL nebulizer solution 3 mL    aluminum-magnesium hydroxide-simethicone 200-200-20 mg/5 mL suspension 30 mL    apixaban tablet 2.5 mg    atorvastatin tablet 80 mg    cefazolin (ANCEF) 2 gram in dextrose 5% 50 mL IVPB (premix)    chlorhexidine 0.12 % solution 10 mL    dextrose 10% bolus 125 mL    dextrose 10% bolus 250 mL    glucagon (human recombinant) injection 1 mg    guaiFENesin 100 mg/5 ml syrup 200 mg    hydrALAZINE injection 10 mg    HYDROcodone-acetaminophen 5-325 mg per tablet 2 tablet    insulin aspart U-100 pen 1-10 Units    metoprolol tartrate (LOPRESSOR) tablet 25 mg    morphine injection 2 mg    ondansetron disintegrating tablet 8 mg    scopolamine 1.3-1.5 mg (1 mg over 3 days) 1 patch    sertraline tablet 25 mg    sodium chloride 0.9% flush 10 mL    sodium chloride 0.9% flush 10 mL    sodium chloride 0.9% flush 10 mL    tacrolimus capsule 1 mg     Objective:     Vital Signs (Most Recent):  Temp: 98.1 °F (36.7  "°C) (08/05/22 0707)  Pulse: 74 (08/05/22 0707)  Resp: 18 (08/05/22 0707)  BP: (!) 145/72 (08/05/22 0707)  SpO2: (!) 94 % (08/05/22 0707) Vital Signs (24h Range):  Temp:  [97.9 °F (36.6 °C)-98.9 °F (37.2 °C)] 98.1 °F (36.7 °C)  Pulse:  [69-87] 74  Resp:  [12-19] 18  SpO2:  [94 %-100 %] 94 %  BP: (124-187)/(56-99) 145/72     Weight: 105.7 kg (233 lb 0.4 oz)  Height: 5' 11" (180.3 cm)  Body mass index is 32.5 kg/m².      Intake/Output Summary (Last 24 hours) at 8/5/2022 0811  Last data filed at 8/5/2022 0427  Gross per 24 hour   Intake 43.41 ml   Output 1050 ml   Net -1006.59 ml       Ortho/SPM Exam   Left lower extremity:  External fixator device is intact  Pin sites are clean  Minimal drainage on the bandages  Moderate edema  Calf and compartments are soft and compressible  Motor exam normal  Sensation and pulses decreased, but this is baseline for the patient    GEN: Well developed, well nourished male. AAOX3. No acute distress.   Normocephalic, atraumatic.   [place]  Breathing unlabored.  Mood and affect appropriate.      Significant Labs:   BMP:   Recent Labs   Lab 08/05/22  0610   *   *   K 4.2      CO2 21*   BUN 26*   CREATININE 1.5*   CALCIUM 7.7*   MG 1.8     CBC:   Recent Labs   Lab 08/04/22  0642 08/05/22  0610   WBC 5.41 4.67   HGB 13.0* 11.7*   HCT 42.6 37.4*    155     POCT Glucose:   Recent Labs   Lab 08/04/22  1629 08/04/22  2154 08/05/22  0634   POCTGLUCOSE 192* 210* 157*     All pertinent labs within the past 24 hours have been reviewed.    Significant Imaging: I have reviewed and interpreted all pertinent imaging results/findings.    Assessment/Plan:     Active Diagnoses:    Diagnosis Date Noted POA    PRINCIPAL PROBLEM:  Closed fracture of left tibia and fibula [S82.202A, S82.402A] 08/04/2022 Yes    Severe central sleep apnea comorbid with prescribed opioid use [F11.90, G47.37] 01/29/2020 Yes    Kidney transplant recipient [Z94.0] 04/07/2017 Not Applicable    Type 2 " diabetes mellitus with stable proliferative retinopathy of both eyes, with long-term current use of insulin [E11.3553, Z79.4] 12/01/2016 Not Applicable    Coronary artery disease of native artery of native heart with stable angina pectoris [I25.118] 07/01/2016 Yes    Osteoarthritis of lumbar spine [M47.816]  Yes    Essential hypertension [I10] 02/20/2015 Yes      Problems Resolved During this Admission:     Assessment:  A 67-year-old female postop day 1 status post closed reduction left tibial and fibular shaft fractures with application of external fixation device    Plan:  NWB LLE  PT/OT for transfers only  Wound care consult for pin site care as well as wounds all over his body  Patient will need rehab/SNF placement    Tam Mcdermott PA-C  Orthopedics  [facility]

## 2022-08-05 NOTE — HOSPITAL COURSE
Pt postop day 1 status post closed reduction left tibial and fibular shaft fractures with application of external fixation device. PT/OT rec SNF placement. General  surgery consulted for eval of L lateral buttock wound, hold apixaban for now.     8/6 POD#2 patient lying in bed resting, c/o lower extremity and back pain. Pt s/p bedside debridement of L lateral buttock wound per general surgery. Resume apixaban. Nephrology consulted for subtherapeutic prograf level. Continue supportive management, awaiting SNF placement.  8/7 POD #3 No acute events overnight. Per surgery patient NWB to LLE. Plan to leave the external fixation device on for approximately 6 weeks and then below-knee casting until fracture heals. Will need f/u  in ortho trauma clinic within 3 weeks. Continue supportive management. Plan SNF placement- patient [facility] for [facility].  Anticipate discharge within 24-48 hours.     8/8 awaiting SNF placement. Per nephrology continue prograf 1 mg every evening, 1.5 mg every morning pending repeat level.   8/9 No acute events overnight. Pt working with PT/OT, awaiting SNF.   8/10 [facility] accepted, awaiting ins authorization, continue supportive management. Consult virtual

## 2022-08-05 NOTE — SUBJECTIVE & OBJECTIVE
No current facility-administered medications on file prior to encounter.     Current Outpatient Medications on File Prior to Encounter   Medication Sig    amLODIPine (NORVASC) 10 MG tablet Take 1 tablet (10 mg total) by mouth once daily.    aspirin (ECOTRIN) 81 MG EC tablet Take 1 tablet (81 mg total) by mouth once daily.    atorvastatin (LIPITOR) 80 MG tablet Take 1 tablet (80 mg total) by mouth once daily.    clopidogreL (PLAVIX) 75 mg tablet Take 1 tablet (75 mg total) by mouth once daily.    ergocalciferol (ERGOCALCIFEROL) 50,000 unit Cap Take 1 capsule (50,000 Units total) by mouth every 7 days. Take on Mondays    furosemide (LASIX) 40 MG tablet Take 1 tablet (40 mg total) by mouth daily as needed (Leg swelling, Nocturnal SOB).    gabapentin (NEURONTIN) 300 MG capsule Take 300 mg by mouth 3 (three) times daily.    HYDROcodone-acetaminophen (NORCO)  mg per tablet 1 tablet by Per G Tube route every 6 (six) hours as needed for Pain.    hydrocortisone 2.5 % cream Apply topically 2 (two) times daily.    insulin aspart U-100 (NOVOLOG) 100 unit/mL (3 mL) InPn pen Inject 5 units three times a day with meal if BG > 300.    levalbuterol (XOPENEX) 1.25 mg/3 mL nebulizer solution Take 3 mLs (1.25 mg total) by nebulization every 6 to 8 hours as needed for Wheezing or Shortness of Breath.    LIDOcaine (LIDODERM) 5 % Place 1 patch onto the skin daily as needed. 12 hours on and 12 hours off    metoprolol tartrate (LOPRESSOR) 25 MG tablet Take 1 tablet (25 mg total) by mouth 2 (two) times daily.    mycophenolate (CELLCEPT) 250 mg Cap Take 1 capsule (250 mg total) by mouth 2 (two) times daily.    nitroGLYCERIN (NITROSTAT) 0.4 MG SL tablet Place 1 tablet (0.4 mg total) under the tongue every 5 (five) minutes as needed.    ondansetron (ZOFRAN-ODT) 4 MG TbDL Take 1 tablet (4 mg total) by mouth every 8 (eight) hours as needed.    sevelamer carbonate (RENVELA) 800 mg Tab Take 800 mg by mouth 3 (three) times daily with meals.     tacrolimus (PROGRAF) 0.5 MG Cap Take 2 capsules (1 mg total) by mouth every 12 (twelve) hours.    cloNIDine (CATAPRES) 0.1 MG tablet Take 1 tablet (0.1 mg total) by mouth 3 (three) times daily. (Patient not taking: Reported on 2022)    flash glucose scanning reader (FREESTYLE BRYAN 14 DAY READER) Misc 1 Device by Misc.(Non-Drug; Combo Route) route as needed.    flash glucose sensor (FREESTYLE BRYAN 14 DAY SENSOR) Kit 1 kit by Misc.(Non-Drug; Combo Route) route every 14 (fourteen) days.    ipratropium (ATROVENT) 0.02 % nebulizer solution Take 2.5 mLs (500 mcg total) by nebulization 4 (four) times daily. (Patient not taking: Reported on 2022)    isosorbide mononitrate (IMDUR) 30 MG 24 hr tablet Take 2 tablets (60 mg total) by mouth once daily. (Patient not taking: Reported on 2022)    ketoconazole (NIZORAL) 2 % cream Apply topically 2 (two) times daily.    linaCLOtide (LINZESS) 72 mcg Cap capsule Take 1 capsule (72 mcg total) by mouth before breakfast.    naloxone (NARCAN) 4 mg/actuation Spry 1 spray by Nasal route once.    semaglutide (OZEMPIC) 1 mg/dose (2 mg/1.5 mL) PnIj Inject 1 mg under the skin every 7 days. (Patient taking differently: ())    sertraline (ZOLOFT) 25 MG tablet Take 1 tablet (25 mg total) by mouth once daily. For depression and anxiety (Patient not taking: Reported on 2022)    VIOS AEROSOL DELIVERY SYSTEM [place] USE Q 4 H PRN       Review of patient's allergies indicates:  No Known Allergies    Past Medical History:   Diagnosis Date    DINORAH (acute kidney injury) 2016    Arthritis     CAD in native artery 2019    CHF (congestive heart failure)     Chronic obstructive pulmonary disease 2016    Coronary artery disease involving native coronary artery of native heart without angina pectoris 2016    CRI (chronic renal insufficiency) 2019     donor kidney transplant for DM 16     Induction with Thymo x3 and IV solumedrol to total 875mg  Kidney  Biopsy  5/31/2016: 16 glomeruli, ACR type 1 AVR type 2, significant microcirculatory changes, c4d negative, No DSA, 5 to10% fibrosis. Treated with thymo x8 6/21/2016- no rejection      Diastolic heart failure     Encounter for blood transfusion     ESRD on RRT since 10/2013 10/29/2013    Biopsy proven diabetic nephropathy and lymphoplasmacytic interstitial infiltrate not c/w with AIN (ddx sjogrens or assoc with tamm-horsefall protein extravasation)     GERD (gastroesophageal reflux disease)     History of hepatitis C, s/p successful Rx w/ SVR12 - 4/2017 4/5/2017    Completed 12 weeks harvoni w/ SVR    Hyperlipidemia     Hypertension     PAD (peripheral artery disease) 7/21/2019    PIC line (peripherally inserted central catheter) flush     Prophylactic immunotherapy     Proteinuria     PVD (peripheral vascular disease) 6/26/2017    RLE BKA CT 12/11/16 Extensive atherosclerotic disease of the aorta and mesenteric arteries.     Renal hypertension     Type 2 diabetes mellitus with diabetic neuropathy, with long-term current use of insulin 12/1/2016    Vitamin B12 deficiency      Past Surgical History:   Procedure Laterality Date    AORTOGRAPHY WITH SERIALOGRAPHY N/A 6/14/2018    Procedure: LEFT LEG ANGIOGRAM;  Surgeon: Donal Mcdonald MD;  Location: [facility] CATH LAB;  Service: Vascular;  Laterality: N/A;    APPLICATION OF LARGE EXTERNAL FIXATION DEVICE TO TIBIA Left 8/4/2022    Procedure: APPLICATION, EXTERNAL FIXATION DEVICE, LARGE, TIBIA;  Surgeon: Good Villa MD;  Location: [facility] OR;  Service: Orthopedics;  Laterality: Left;    av bovine graft      Left UE    AV FISTULA PLACEMENT      left UE    CARDIAC CATHETERIZATION  02/2015    CLOSED REDUCTION OF INJURY OF TIBIA Left 8/4/2022    Procedure: CLOSED REDUCTION, TIBIA;  Surgeon: Good Villa MD;  Location: [facility] OR;  Service: Orthopedics;  Laterality: Left;  Closed reduction left tibial and fibular shaft fractures    CLOSURE OF WOUND Left 9/24/2018    Procedure:  CLOSURE, WOUND;  Surgeon: Karla Wheeler DPM;  Location: [facility] OR;  Service: Podiatry;  Laterality: Left;  Secondary Wound closure, extensive    CLOSURE OF WOUND Left 11/5/2018    Procedure: CLOSURE, WOUND;  Surgeon: Karla Wheeler DPM;  Location: [facility] OR;  Service: Podiatry;  Laterality: Left;    DEBRIDEMENT OF MULTIPLE METATARSAL BONES Left 11/5/2018    Procedure: DEBRIDEMENT, METATARSAL BONE, 2 OR MORE BONES;  Surgeon: Karla Wheeler DPM;  Location: [facility] OR;  Service: Podiatry;  Laterality: Left;    EXCISION OF SKIN Left 9/27/2019    Procedure: EXCISION, SKIN;  Surgeon: Lenard Alarcon MD;  Location: [address];  Service: Plastics;  Laterality: Left;  Plastics set, NIMS monitor, ACell    FOOT AMPUTATION THROUGH METATARSAL Left 9/21/2018    Procedure: AMPUTATION, FOOT, TRANSMETATARSAL;  Surgeon: Karla Wheeler DPM;  Location: [facility] OR;  Service: Podiatry;  Laterality: Left;    FOOT AMPUTATION THROUGH METATARSAL Left 10/31/2018    Procedure: AMPUTATION, FOOT, TRANSMETATARSAL;  Surgeon: Karla Wheeler DPM;  Location: [facility] OR;  Service: Podiatry;  Laterality: Left;    FOOT AMPUTATION THROUGH METATARSAL Left 11/5/2018    Procedure: AMPUTATION, FOOT, TRANSMETATARSAL;  Surgeon: Karla Wheeler DPM;  Location: [facility] OR;  Service: Podiatry;  Laterality: Left;  revisional transmetatarsal amputation, Left foot    IRRIGATION AND DEBRIDEMENT OF LOWER EXTREMITY Left 10/31/2018    Procedure: IRRIGATION AND DEBRIDEMENT, LOWER EXTREMITY;  Surgeon: Karla Wheeler DPM;  Location: [facility] OR;  Service: Podiatry;  Laterality: Left;    KIDNEY TRANSPLANT  05/21/2016    LEFT HEART CATHETERIZATION Left 7/21/2019    Procedure: CATHETERIZATION, HEART, LEFT;  Surgeon: Andrew Valdez MD;  Location: [facility] CATH LAB;  Service: Cardiology;  Laterality: Left;    LEG AMPUTATION THROUGH KNEE  2011    right LE, started as nail puncture leading to diabetic ulcer    SKIN FULL THICKNESS GRAFT Left 10/7/2019     Procedure: APPLICATION, GRAFT, SKIN, FULL-THICKNESS;  Surgeon: Lenard Alarcon MD;  [facility];  Service: Plastics;  Laterality: Left;    SURGICAL REMOVAL OF LESION OF FACE Right 10/7/2019    Procedure: EXCISION, LESION, FACE;  Surgeon: Lenard Alarcon MD;  [facility];  Service: Plastics;  Laterality: Right;     Family History       Problem Relation (Age of Onset)    Cancer Father    Diabetes Father    Heart failure Father, Mother    Stroke Father          Tobacco Use    Smoking status: Former Smoker     Packs/day: 1.00     Years: 40.00     Pack years: 40.00     Quit date: 2013     Years since quittin.5    Smokeless tobacco: Never Used   Substance and Sexual Activity    Alcohol use: Yes     Alcohol/week: 6.0 standard drinks     Types: 6 Cans of beer per week     Comment: seldom    Drug use: No    Sexual activity: Never     Review of Systems   Constitutional: Negative.    HENT: Negative.     Eyes: Negative.    Respiratory: Negative.     Cardiovascular: Negative.    Gastrointestinal: Negative.    Endocrine: Negative.    Genitourinary: Negative.    Musculoskeletal: Negative.         Left ankle pain   Skin: Negative.    Allergic/Immunologic: Negative.    Neurological: Negative.    Hematological: Negative.    Psychiatric/Behavioral: Negative.     Objective:     Vital Signs (Most Recent):  Temp: 98.8 °F (37.1 °C) (22 1206)  Pulse: 67 (22 1206)  Resp: 16 (22 1425)  BP: 134/74 (22 1206)  SpO2: (!) 94 % (22 1206)   Vital Signs (24h Range):  Temp:  [98.1 °F (36.7 °C)-98.9 °F (37.2 °C)] 98.8 °F (37.1 °C)  Pulse:  [67-77] 67  Resp:  [14-19] 16  SpO2:  [94 %-96 %] 94 %  BP: (134-183)/(64-99) 134/74     Weight: 105.7 kg (233 lb 0.4 oz)  Body mass index is 32.5 kg/m².    Physical Exam  Vitals reviewed.   Constitutional:       Appearance: He is ill-appearing (Chronically).   Cardiovascular:      Rate and Rhythm: Normal rate and regular rhythm.   Pulmonary:       Effort: Pulmonary effort is normal.      Breath sounds: Normal breath sounds.   Abdominal:      General: Abdomen is flat.      Palpations: Abdomen is soft.   Musculoskeletal:      Comments: Left forefoot is absent, Orthopedics dressings and devices on the left lower extremity   Skin:     Comments: There is a 3 by 6 cm eschar with surrounding erythema on left hip, see photo   Neurological:      Mental Status: He is alert.         Significant Labs:  I have reviewed all pertinent lab results within the past 24 hours.  CBC:   Recent Labs   Lab 08/05/22  0610   WBC 4.67   RBC 3.76*   HGB 11.7*   HCT 37.4*      *   MCH 31.1*   MCHC 31.3*     BMP:   Recent Labs   Lab 08/05/22  0610   *   *   K 4.2      CO2 21*   BUN 26*   CREATININE 1.5*   CALCIUM 7.7*   MG 1.8       Significant Diagnostics:  I have reviewed all pertinent imaging results/findings within the past 24 hours.  No pertinent imaging

## 2022-08-05 NOTE — ASSESSMENT & PLAN NOTE
Patient with distal tib fib fracture   Ortho consulted on case and plans to operate later today  Cont npo  Morphine PRN  Patient with numerous comorbid conditions including  Cad, pvd, copd, diabetes  Poor functional capacity at home with METS < 4  Chest xray unremarkable  Ekg showed first degree AV block  Will obtain stat echo to evaluate LV function  Pt at least moderate risk for cardiac complications    Advance Care Planning     Patient is a full code and sdm is his sister.      8/5/22 POD#1   NWB LLE per ortho recs  Continue pain management  PT/OT rec SNF

## 2022-08-05 NOTE — PLAN OF CARE
OT sanjay completed. Pt performed bed mobility with SBA. Unable to assess t/fs today. Pt requiring max encouragement to participate in therapy. Recommends skilled nursing.

## 2022-08-05 NOTE — CONSULTS
[facility] Surg  General Surgery  Consult Note    Patient Name: Lavelle Ladd  MRN: [id number]  Code Status: Full Code  Admission Date: 8/4/2022  Hospital Length of Stay: 1 days  Attending Physician: Chapin Proctor MD  Primary Care Provider: Yahir Calzada MD    Patient information was obtained from patient, past medical records and ER records.     Inpatient consult to General Surgery  Consult performed by: Ha Juarez MD  Consult ordered by: Chapin Proctor MD  Reason for consult: Pressure ulcer  Assessment/Recommendations: Likely a stage II decubitus of the left hip.    Will debrided bedside tomorrow    Inpatient consult to General Surgery  Consult performed by: Ha Juarez MD  Consult ordered by: Sherita Christensen NP  Reason for consult: Decubitus  Assessment/Recommendations: Likely a stage II pressure ulcers.    We will debride at bedside tomorrow        Subjective:     Principal Problem: Closed fracture of left tibia and fibula    History of Present Illness:  Patient presented to the emergency room with a left ankle fracture.  He was found at home unlikely on the ground for several days.  Has a left hip pressure ulcer.    Surgery was asked to see him to debride the left hip pressure ulcer    No current facility-administered medications on file prior to encounter.     Current Outpatient Medications on File Prior to Encounter   Medication Sig    amLODIPine (NORVASC) 10 MG tablet Take 1 tablet (10 mg total) by mouth once daily.    aspirin (ECOTRIN) 81 MG EC tablet Take 1 tablet (81 mg total) by mouth once daily.    atorvastatin (LIPITOR) 80 MG tablet Take 1 tablet (80 mg total) by mouth once daily.    clopidogreL (PLAVIX) 75 mg tablet Take 1 tablet (75 mg total) by mouth once daily.    ergocalciferol (ERGOCALCIFEROL) 50,000 unit Cap Take 1 capsule (50,000 Units total) by mouth every 7 days. Take on Mondays    furosemide (LASIX) 40 MG tablet Take 1 tablet (40 mg total) by mouth daily as  needed (Leg swelling, Nocturnal SOB).    gabapentin (NEURONTIN) 300 MG capsule Take 300 mg by mouth 3 (three) times daily.    HYDROcodone-acetaminophen (NORCO)  mg per tablet 1 tablet by Per G Tube route every 6 (six) hours as needed for Pain.    hydrocortisone 2.5 % cream Apply topically 2 (two) times daily.    insulin aspart U-100 (NOVOLOG) 100 unit/mL (3 mL) InPn pen Inject 5 units three times a day with meal if BG > 300.    levalbuterol (XOPENEX) 1.25 mg/3 mL nebulizer solution Take 3 mLs (1.25 mg total) by nebulization every 6 to 8 hours as needed for Wheezing or Shortness of Breath.    LIDOcaine (LIDODERM) 5 % Place 1 patch onto the skin daily as needed. 12 hours on and 12 hours off    metoprolol tartrate (LOPRESSOR) 25 MG tablet Take 1 tablet (25 mg total) by mouth 2 (two) times daily.    mycophenolate (CELLCEPT) 250 mg Cap Take 1 capsule (250 mg total) by mouth 2 (two) times daily.    nitroGLYCERIN (NITROSTAT) 0.4 MG SL tablet Place 1 tablet (0.4 mg total) under the tongue every 5 (five) minutes as needed.    ondansetron (ZOFRAN-ODT) 4 MG TbDL Take 1 tablet (4 mg total) by mouth every 8 (eight) hours as needed.    sevelamer carbonate (RENVELA) 800 mg Tab Take 800 mg by mouth 3 (three) times daily with meals.    tacrolimus (PROGRAF) 0.5 MG Cap Take 2 capsules (1 mg total) by mouth every 12 (twelve) hours.    cloNIDine (CATAPRES) 0.1 MG tablet Take 1 tablet (0.1 mg total) by mouth 3 (three) times daily. (Patient not taking: Reported on 8/4/2022)    flash glucose scanning reader (FREESTYLE BRYAN 14 DAY READER) Misc 1 Device by Misc.(Non-Drug; Combo Route) route as needed.    flash glucose sensor (FREESTYLE BRYAN 14 DAY SENSOR) Kit 1 kit by Misc.(Non-Drug; Combo Route) route every 14 (fourteen) days.    ipratropium (ATROVENT) 0.02 % nebulizer solution Take 2.5 mLs (500 mcg total) by nebulization 4 (four) times daily. (Patient not taking: Reported on 8/4/2022)    isosorbide mononitrate  (IMDUR) 30 MG 24 hr tablet Take 2 tablets (60 mg total) by mouth once daily. (Patient not taking: Reported on 2022)    ketoconazole (NIZORAL) 2 % cream Apply topically 2 (two) times daily.    linaCLOtide (LINZESS) 72 mcg Cap capsule Take 1 capsule (72 mcg total) by mouth before breakfast.    naloxone (NARCAN) 4 mg/actuation Spry 1 spray by Nasal route once.    semaglutide (OZEMPIC) 1 mg/dose (2 mg/1.5 mL) PnIj Inject 1 mg under the skin every 7 days. (Patient taking differently: ())    sertraline (ZOLOFT) 25 MG tablet Take 1 tablet (25 mg total) by mouth once daily. For depression and anxiety (Patient not taking: Reported on 2022)    VIOS AEROSOL DELIVERY SYSTEM [place] USE Q 4 H PRN       Review of patient's allergies indicates:  No Known Allergies    Past Medical History:   Diagnosis Date    DINORAH (acute kidney injury) 2016    Arthritis     CAD in native artery 2019    CHF (congestive heart failure)     Chronic obstructive pulmonary disease 2016    Coronary artery disease involving native coronary artery of native heart without angina pectoris 2016    CRI (chronic renal insufficiency) 2019     donor kidney transplant for DM 16     Induction with Thymo x3 and IV solumedrol to total 875mg  Kidney Biopsy  2016: 16 glomeruli, ACR type 1 AVR type 2, significant microcirculatory changes, c4d negative, No DSA, 5 to10% fibrosis. Treated with thymo x8 2016- no rejection      Diastolic heart failure     Encounter for blood transfusion     ESRD on RRT since 10/2013 10/29/2013    Biopsy proven diabetic nephropathy and lymphoplasmacytic interstitial infiltrate not c/w with AIN (ddx sjogrens or assoc with tamm-horsefall protein extravasation)     GERD (gastroesophageal reflux disease)     History of hepatitis C, s/p successful Rx w/ SVR12 - 2017    Completed 12 weeks harvoni w/ SVR    Hyperlipidemia     Hypertension     PAD (peripheral  artery disease) 7/21/2019    PIC line (peripherally inserted central catheter) flush     Prophylactic immunotherapy     Proteinuria     PVD (peripheral vascular disease) 6/26/2017    RLE BKA CT 12/11/16 Extensive atherosclerotic disease of the aorta and mesenteric arteries.     Renal hypertension     Type 2 diabetes mellitus with diabetic neuropathy, with long-term current use of insulin 12/1/2016    Vitamin B12 deficiency      Past Surgical History:   Procedure Laterality Date    AORTOGRAPHY WITH SERIALOGRAPHY N/A 6/14/2018    Procedure: LEFT LEG ANGIOGRAM;  Surgeon: Donal Mcdonald MD;  Location: [facility] CATH LAB;  Service: Vascular;  Laterality: N/A;    APPLICATION OF LARGE EXTERNAL FIXATION DEVICE TO TIBIA Left 8/4/2022    Procedure: APPLICATION, EXTERNAL FIXATION DEVICE, LARGE, TIBIA;  Surgeon: Good Villa MD;  Location: [facility] OR;  Service: Orthopedics;  Laterality: Left;    av bovine graft      Left UE    AV FISTULA PLACEMENT      left UE    CARDIAC CATHETERIZATION  02/2015    CLOSED REDUCTION OF INJURY OF TIBIA Left 8/4/2022    Procedure: CLOSED REDUCTION, TIBIA;  Surgeon: Good Villa MD;  Location: [facility] OR;  Service: Orthopedics;  Laterality: Left;  Closed reduction left tibial and fibular shaft fractures    CLOSURE OF WOUND Left 9/24/2018    Procedure: CLOSURE, WOUND;  Surgeon: Karla Wheeler DPM;  Location: [facility] OR;  Service: Podiatry;  Laterality: Left;  Secondary Wound closure, extensive    CLOSURE OF WOUND Left 11/5/2018    Procedure: CLOSURE, WOUND;  Surgeon: Karla Wheeler DPM;  Location: [facility] OR;  Service: Podiatry;  Laterality: Left;    DEBRIDEMENT OF MULTIPLE METATARSAL BONES Left 11/5/2018    Procedure: DEBRIDEMENT, METATARSAL BONE, 2 OR MORE BONES;  Surgeon: Karla Wheeler DPM;  Location: [facility] OR;  Service: Podiatry;  Laterality: Left;    EXCISION OF SKIN Left 9/27/2019    Procedure: EXCISION, SKIN;  Surgeon: Lenard Alarcon MD;  Location: [address];   Service: Plastics;  Laterality: Left;  Plastics set, NIMS monitor, [employer]    FOOT AMPUTATION THROUGH METATARSAL Left 9/21/2018    Procedure: AMPUTATION, FOOT, TRANSMETATARSAL;  Surgeon: Karla Wheeler DPM;  Location: [facility] OR;  Service: Podiatry;  Laterality: Left;    FOOT AMPUTATION THROUGH METATARSAL Left 10/31/2018    Procedure: AMPUTATION, FOOT, TRANSMETATARSAL;  Surgeon: Karla Wheeler DPM;  Location: [facility] OR;  Service: Podiatry;  Laterality: Left;    FOOT AMPUTATION THROUGH METATARSAL Left 11/5/2018    Procedure: AMPUTATION, FOOT, TRANSMETATARSAL;  Surgeon: Karla Wheeler DPM;  Location: [facility] OR;  Service: Podiatry;  Laterality: Left;  revisional transmetatarsal amputation, Left foot    IRRIGATION AND DEBRIDEMENT OF LOWER EXTREMITY Left 10/31/2018    Procedure: IRRIGATION AND DEBRIDEMENT, LOWER EXTREMITY;  Surgeon: Karla Wheeler DPM;  Location: [facility] OR;  Service: Podiatry;  Laterality: Left;    KIDNEY TRANSPLANT  05/21/2016    LEFT HEART CATHETERIZATION Left 7/21/2019    Procedure: CATHETERIZATION, HEART, LEFT;  Surgeon: Andrew Valdez MD;  Location: [facility] CATH LAB;  Service: Cardiology;  Laterality: Left;    LEG AMPUTATION THROUGH KNEE  2011    right LE, started as nail puncture leading to diabetic ulcer    SKIN FULL THICKNESS GRAFT Left 10/7/2019    Procedure: APPLICATION, GRAFT, SKIN, FULL-THICKNESS;  Surgeon: Lenard Alarcon MD;  [address]  Service: [address]astics;  Laterality: Left;    SURGICAL REMOVAL OF LESION OF FACE Right 10/7/2019    Procedure: EXCISION, LESION, FACE;  Surgeon: Lenard Alarcon MD;  Location[facility] OR [facility];  Service: Plastics;  Laterality: Right;     Family History       Problem Relation (Age of Onset)    Cancer Father    Diabetes Father    Heart failure Father, Mother    Stroke Father          Tobacco Use    Smoking status: Former Smoker     Packs/day: 1.00     Years: 40.00     Pack years: 40.00     Quit date: 1/11/2013     Years since  quittin.5    Smokeless tobacco: Never Used   Substance and Sexual Activity    Alcohol use: Yes     Alcohol/week: 6.0 standard drinks     Types: 6 Cans of beer per week     Comment: seldom    Drug use: No    Sexual activity: Never     Review of Systems   Constitutional: Negative.    HENT: Negative.     Eyes: Negative.    Respiratory: Negative.     Cardiovascular: Negative.    Gastrointestinal: Negative.    Endocrine: Negative.    Genitourinary: Negative.    Musculoskeletal: Negative.         Left ankle pain   Skin: Negative.    Allergic/Immunologic: Negative.    Neurological: Negative.    Hematological: Negative.    Psychiatric/Behavioral: Negative.     Objective:     Vital Signs (Most Recent):  Temp: 98.8 °F (37.1 °C) (22 1206)  Pulse: 67 (22 1206)  Resp: 16 (22 1425)  BP: 134/74 (22 1206)  SpO2: (!) 94 % (22 1206)   Vital Signs (24h Range):  Temp:  [98.1 °F (36.7 °C)-98.9 °F (37.2 °C)] 98.8 °F (37.1 °C)  Pulse:  [67-77] 67  Resp:  [14-19] 16  SpO2:  [94 %-96 %] 94 %  BP: (134-183)/(64-99) 134/74     Weight: 105.7 kg (233 lb 0.4 oz)  Body mass index is 32.5 kg/m².    Physical Exam  Vitals reviewed.   Constitutional:       Appearance: He is ill-appearing (Chronically).   Cardiovascular:      Rate and Rhythm: Normal rate and regular rhythm.   Pulmonary:      Effort: Pulmonary effort is normal.      Breath sounds: Normal breath sounds.   Abdominal:      General: Abdomen is flat.      Palpations: Abdomen is soft.   Musculoskeletal:      Comments: Left forefoot is absent, Orthopedics dressings and devices on the left lower extremity   Skin:     Comments: There is a 3 by 6 cm eschar with surrounding erythema on left hip, see photo   Neurological:      Mental Status: He is alert.         Significant Labs:  I have reviewed all pertinent lab results within the past 24 hours.  CBC:   Recent Labs   Lab 22  0610   WBC 4.67   RBC 3.76*   HGB 11.7*   HCT 37.4*      *   MCH  31.1*   MCHC 31.3*     BMP:   Recent Labs   Lab 08/05/22  0610   *   *   K 4.2      CO2 21*   BUN 26*   CREATININE 1.5*   CALCIUM 7.7*   MG 1.8       Significant Diagnostics:  I have reviewed all pertinent imaging results/findings within the past 24 hours.  No pertinent imaging      Assessment/Plan:     Pressure injury of left hip, stage 2  Will unroof/debrided the eschar at the bedside tomorrow under local anesthetic.  This was discussed with patient.  Risks benefits and complications were discussed.    Patient will have his Eliquis to hold till after debridement      VTE Risk Mitigation (From admission, onward)         Ordered     IP VTE HIGH RISK PATIENT  Once         08/04/22 1601     Place sequential compression device  Until discontinued         08/04/22 0608                Thank you for your consult. I will follow-up with patient. Please contact us if you have any additional questions.    Ha Juarez MD  General Surgery  [facility]

## 2022-08-05 NOTE — PROGRESS NOTES
[facility] Medicine  Progress Note    Patient Name: Lavelle Ladd  MRN: [id number]  Patient Class: IP- Inpatient   Admission Date: 2022  Length of Stay: 1 days  Attending Physician: Chapin Proctor MD  Primary Care Provider: Yahir Calzada MD        Subjective:     Principal Problem:Closed fracture of left tibia and fibula        HPI:  Patient is a 67 y.o.  male with a PMHx of HTN, CAD, DM, PVD, kidney transplant, COPD, right BKA, left transmetatarsal amputation who presents to the Emergency Department for evaluation of a fall which onset suddenly 5 hours ago. Patient states he was trying to get into his wheel chair when he slipped and fell. He reported not being able to put weight on it and laid on the ground for 4-5 hours before calling ems. Patient denied any issues with anesthesia with prior surgeries. Currently complains of pain and nausea. Otherwise denies any other issues.     In the ED, ankle xray showed distal tib fib fracture. Ortho notified and plans to operate later this afternoon. Patient was admitted to .               Overview/Hospital Course:  Pt postop day 1 status post closed reduction left tibial and fibular shaft fractures with application of external fixation device. PT/OT rec SNF placement. General  surgery consulted for eval of L lateral buttock wound, hold apixaban for now.       Past Medical History:   Diagnosis Date    DINORAH (acute kidney injury) 2016    Arthritis     CAD in native artery 2019    CHF (congestive heart failure)     Chronic obstructive pulmonary disease 2016    Coronary artery disease involving native coronary artery of native heart without angina pectoris 2016    CRI (chronic renal insufficiency) 2019     donor kidney transplant for DM 16     Induction with Thymo x3 and IV solumedrol to total 875mg  Kidney Biopsy  2016: 16 glomeruli, ACR type 1 AVR type 2, significant microcirculatory changes,  c4d negative, No DSA, 5 to10% fibrosis. Treated with thymo x8 6/21/2016- no rejection      Diastolic heart failure     Encounter for blood transfusion     ESRD on RRT since 10/2013 10/29/2013    Biopsy proven diabetic nephropathy and lymphoplasmacytic interstitial infiltrate not c/w with AIN (ddx sjogrens or assoc with tamm-horsefall protein extravasation)     GERD (gastroesophageal reflux disease)     History of hepatitis C, s/p successful Rx w/ SVR12 - 4/2017 4/5/2017    Completed 12 weeks harvoni w/ SVR    Hyperlipidemia     Hypertension     PAD (peripheral artery disease) 7/21/2019    PIC line (peripherally inserted central catheter) flush     Prophylactic immunotherapy     Proteinuria     PVD (peripheral vascular disease) 6/26/2017    RLE BKA CT 12/11/16 Extensive atherosclerotic disease of the aorta and mesenteric arteries.     Renal hypertension     Type 2 diabetes mellitus with diabetic neuropathy, with long-term current use of insulin 12/1/2016    Vitamin B12 deficiency        Past Surgical History:   Procedure Laterality Date    AORTOGRAPHY WITH SERIALOGRAPHY N/A 6/14/2018    Procedure: LEFT LEG ANGIOGRAM;  Surgeon: Donal Mcdonald MD;  Location: [facility] CATH LAB;  Service: Vascular;  Laterality: N/A;    APPLICATION OF LARGE EXTERNAL FIXATION DEVICE TO TIBIA Left 8/4/2022    Procedure: APPLICATION, EXTERNAL FIXATION DEVICE, LARGE, TIBIA;  Surgeon: Good Villa MD;  Location: [facility] OR;  Service: Orthopedics;  Laterality: Left;    av bovine graft      Left UE    AV FISTULA PLACEMENT      left UE    CARDIAC CATHETERIZATION  02/2015    CLOSED REDUCTION OF INJURY OF TIBIA Left 8/4/2022    Procedure: CLOSED REDUCTION, TIBIA;  Surgeon: Good Villa MD;  Location: [facility] OR;  Service: Orthopedics;  Laterality: Left;  Closed reduction left tibial and fibular shaft fractures    CLOSURE OF WOUND Left 9/24/2018    Procedure: CLOSURE, WOUND;  Surgeon: Karla Wheeler DPM;  Location: [facility] OR;   Service: Podiatry;  Laterality: Left;  Secondary Wound closure, extensive    CLOSURE OF WOUND Left 11/5/2018    Procedure: CLOSURE, WOUND;  Surgeon: Karla Wheeler DPM;  Location: [facility] OR;  Service: Podiatry;  Laterality: Left;    DEBRIDEMENT OF MULTIPLE METATARSAL BONES Left 11/5/2018    Procedure: DEBRIDEMENT, METATARSAL BONE, 2 OR MORE BONES;  Surgeon: Karla Wheeler DPM;  Location: [facility] OR;  Service: Podiatry;  Laterality: Left;    EXCISION OF SKIN Left 9/27/2019    Procedure: EXCISION, SKIN;  Surgeon: Lenard Alarcon MD;  Location: [address];  Service: Plastics;  Laterality: Left;  Plastics set, NIMS monitor, ACell    FOOT AMPUTATION THROUGH METATARSAL Left 9/21/2018    Procedure: AMPUTATION, FOOT, TRANSMETATARSAL;  Surgeon: Karla Wheeler DPM;  Location: [facility] OR;  Service: Podiatry;  Laterality: Left;    FOOT AMPUTATION THROUGH METATARSAL Left 10/31/2018    Procedure: AMPUTATION, FOOT, TRANSMETATARSAL;  Surgeon: Karla Wheeler DPM;  Location: [facility] OR;  Service: Podiatry;  Laterality: Left;    FOOT AMPUTATION THROUGH METATARSAL Left 11/5/2018    Procedure: AMPUTATION, FOOT, TRANSMETATARSAL;  Surgeon: Karla Wheeler DPM;  Location: [facility] OR;  Service: Podiatry;  Laterality: Left;  revisional transmetatarsal amputation, Left foot    IRRIGATION AND DEBRIDEMENT OF LOWER EXTREMITY Left 10/31/2018    Procedure: IRRIGATION AND DEBRIDEMENT, LOWER EXTREMITY;  Surgeon: Karla Wheeler DPM;  Location: [facility] OR;  Service: Podiatry;  Laterality: Left;    KIDNEY TRANSPLANT  05/21/2016    LEFT HEART CATHETERIZATION Left 7/21/2019    Procedure: CATHETERIZATION, HEART, LEFT;  Surgeon: Andrew Valdez MD;  Location: [facility] CATH LAB;  Service: Cardiology;  Laterality: Left;    LEG AMPUTATION THROUGH KNEE  2011    right LE, started as nail puncture leading to diabetic ulcer    SKIN FULL THICKNESS GRAFT Left 10/7/2019    Procedure: APPLICATION, GRAFT, SKIN, FULL-THICKNESS;  Surgeon: Lenard CHANG  MD Agnes;  Location: [address];  Service: Plastics;  Laterality: Left;    SURGICAL REMOVAL OF LESION OF FACE Right 10/7/2019    Procedure: EXCISION, LESION, FACE;  Surgeon: Lenard Alarcon MD;  Location: [address];  Service: Plastics;  Laterality: Right;       Review of patient's allergies indicates:  No Known Allergies    No current facility-administered medications on file prior to encounter.     Current Outpatient Medications on File Prior to Encounter   Medication Sig    amLODIPine (NORVASC) 10 MG tablet Take 1 tablet (10 mg total) by mouth once daily.    aspirin (ECOTRIN) 81 MG EC tablet Take 1 tablet (81 mg total) by mouth once daily.    atorvastatin (LIPITOR) 80 MG tablet Take 1 tablet (80 mg total) by mouth once daily.    clopidogreL (PLAVIX) 75 mg tablet Take 1 tablet (75 mg total) by mouth once daily.    ergocalciferol (ERGOCALCIFEROL) 50,000 unit Cap Take 1 capsule (50,000 Units total) by mouth every 7 days. Take on Mondays    furosemide (LASIX) 40 MG tablet Take 1 tablet (40 mg total) by mouth daily as needed (Leg swelling, Nocturnal SOB).    gabapentin (NEURONTIN) 300 MG capsule Take 300 mg by mouth 3 (three) times daily.    HYDROcodone-acetaminophen (NORCO)  mg per tablet 1 tablet by Per G Tube route every 6 (six) hours as needed for Pain.    hydrocortisone 2.5 % cream Apply topically 2 (two) times daily.    insulin aspart U-100 (NOVOLOG) 100 unit/mL (3 mL) InPn pen Inject 5 units three times a day with meal if BG > 300.    levalbuterol (XOPENEX) 1.25 mg/3 mL nebulizer solution Take 3 mLs (1.25 mg total) by nebulization every 6 to 8 hours as needed for Wheezing or Shortness of Breath.    LIDOcaine (LIDODERM) 5 % Place 1 patch onto the skin daily as needed. 12 hours on and 12 hours off    metoprolol tartrate (LOPRESSOR) 25 MG tablet Take 1 tablet (25 mg total) by mouth 2 (two) times daily.    mycophenolate (CELLCEPT) 250 mg Cap Take 1 capsule (250 mg total) by mouth 2  (two) times daily.    nitroGLYCERIN (NITROSTAT) 0.4 MG SL tablet Place 1 tablet (0.4 mg total) under the tongue every 5 (five) minutes as needed.    ondansetron (ZOFRAN-ODT) 4 MG TbDL Take 1 tablet (4 mg total) by mouth every 8 (eight) hours as needed.    sevelamer carbonate (RENVELA) 800 mg Tab Take 800 mg by mouth 3 (three) times daily with meals.    tacrolimus (PROGRAF) 0.5 MG Cap Take 2 capsules (1 mg total) by mouth every 12 (twelve) hours.    cloNIDine (CATAPRES) 0.1 MG tablet Take 1 tablet (0.1 mg total) by mouth 3 (three) times daily. (Patient not taking: Reported on 8/4/2022)    flash glucose scanning reader (FREESTYLE BRYAN 14 DAY READER) Misc 1 Device by Misc.(Non-Drug; Combo Route) route as needed.    flash glucose sensor (FREESTYLE BRYAN 14 DAY SENSOR) Kit 1 kit by Misc.(Non-Drug; Combo Route) route every 14 (fourteen) days.    ipratropium (ATROVENT) 0.02 % nebulizer solution Take 2.5 mLs (500 mcg total) by nebulization 4 (four) times daily. (Patient not taking: Reported on 8/4/2022)    isosorbide mononitrate (IMDUR) 30 MG 24 hr tablet Take 2 tablets (60 mg total) by mouth once daily. (Patient not taking: Reported on 8/4/2022)    ketoconazole (NIZORAL) 2 % cream Apply topically 2 (two) times daily.    linaCLOtide (LINZESS) 72 mcg Cap capsule Take 1 capsule (72 mcg total) by mouth before breakfast.    naloxone (NARCAN) 4 mg/actuation Spry 1 spray by Nasal route once.    semaglutide (OZEMPIC) 1 mg/dose (2 mg/1.5 mL) PnIj Inject 1 mg under the skin every 7 days. (Patient taking differently: (Saturdays))    sertraline (ZOLOFT) 25 MG tablet Take 1 tablet (25 mg total) by mouth once daily. For depression and anxiety (Patient not taking: Reported on 8/4/2022)    VIOS AEROSOL DELIVERY SYSTEM [place] USE Q 4 H PRN     Family History       Problem Relation (Age of Onset)    Cancer Father    Diabetes Father    Heart failure Father, Mother    Stroke Father          Tobacco Use    Smoking status: Former  Smoker     Packs/day: 1.00     Years: 40.00     Pack years: 40.00     Quit date: 2013     Years since quittin.5    Smokeless tobacco: Never Used   Substance and Sexual Activity    Alcohol use: Yes     Alcohol/week: 6.0 standard drinks     Types: 6 Cans of beer per week     Comment: seldom    Drug use: No    Sexual activity: Never     Review of Systems   Constitutional:  Negative for fatigue and fever.   HENT:  Negative for sinus pressure.    Eyes:  Negative for visual disturbance.   Respiratory:  Negative for shortness of breath.    Cardiovascular:  Negative for chest pain.   Gastrointestinal:  Negative for vomiting.   Genitourinary:  Negative for difficulty urinating.   Musculoskeletal:  Positive for gait problem and joint swelling. Negative for back pain.   Skin:  Negative for rash.   Neurological:  Negative for headaches.   Psychiatric/Behavioral:  Negative for confusion.    Objective:     Vital Signs (Most Recent):  Temp: 98.8 °F (37.1 °C) (22 1206)  Pulse: 67 (22 1206)  Resp: 16 (22 1425)  BP: 134/74 (22 1206)  SpO2: (!) 94 % (22 1206)   Vital Signs (24h Range):  Temp:  [98.1 °F (36.7 °C)-98.9 °F (37.2 °C)] 98.8 °F (37.1 °C)  Pulse:  [67-77] 67  Resp:  [14-19] 16  SpO2:  [94 %-96 %] 94 %  BP: (134-183)/(64-99) 134/74     Weight: 105.7 kg (233 lb 0.4 oz)  Body mass index is 32.5 kg/m².    Physical Exam  Constitutional:       General: He is not in acute distress.     Appearance: He is well-developed. He is obese. He is not diaphoretic.   HENT:      Head: Normocephalic and atraumatic.   Eyes:      Pupils: Pupils are equal, round, and reactive to light.   Cardiovascular:      Rate and Rhythm: Normal rate and regular rhythm.      Heart sounds: Normal heart sounds. No murmur heard.    No friction rub. No gallop.   Pulmonary:      Effort: Pulmonary effort is normal. No respiratory distress.      Breath sounds: Normal breath sounds. No stridor. No wheezing or rales.    Abdominal:      General: Bowel sounds are normal. There is no distension.      Palpations: Abdomen is soft. There is no mass.      Tenderness: There is no abdominal tenderness. There is no guarding.   Musculoskeletal:      Comments: Right bka, left transmetatarsal amputation   Skin:     General: Skin is warm.      Findings: No erythema.   Neurological:      Mental Status: He is alert and oriented to person, place, and time.         CRANIAL NERVES     CN III, IV, VI   Pupils are equal, round, and reactive to light.     Significant Labs:   Results for orders placed or performed during the hospital encounter of 08/04/22   CBC auto differential   Result Value Ref Range    WBC 5.41 3.90 - 12.70 K/uL    RBC 4.28 (L) 4.60 - 6.20 M/uL    Hemoglobin 13.0 (L) 14.0 - 18.0 g/dL    Hematocrit 42.6 40.0 - 54.0 %     (H) 82 - 98 fL    MCH 30.4 27.0 - 31.0 pg    MCHC 30.5 (L) 32.0 - 36.0 g/dL    RDW 13.5 11.5 - 14.5 %    Platelets 201 150 - 450 K/uL    MPV 9.2 9.2 - 12.9 fL    Immature Granulocytes 0.4 0.0 - 0.5 %    Gran # (ANC) 3.7 1.8 - 7.7 K/uL    Immature Grans (Abs) 0.02 0.00 - 0.04 K/uL    Lymph # 1.0 1.0 - 4.8 K/uL    Mono # 0.6 0.3 - 1.0 K/uL    Eos # 0.0 0.0 - 0.5 K/uL    Baso # 0.02 0.00 - 0.20 K/uL    nRBC 0 0 /100 WBC    Gran % 69.1 38.0 - 73.0 %    Lymph % 18.1 18.0 - 48.0 %    Mono % 11.8 4.0 - 15.0 %    Eosinophil % 0.2 0.0 - 8.0 %    Basophil % 0.4 0.0 - 1.9 %    Differential Method Automated    Comprehensive metabolic panel   Result Value Ref Range    Sodium 136 136 - 145 mmol/L    Potassium 4.9 3.5 - 5.1 mmol/L    Chloride 102 95 - 110 mmol/L    CO2 24 23 - 29 mmol/L    Glucose 250 (H) 70 - 110 mg/dL    BUN 30 (H) 8 - 23 mg/dL    Creatinine 1.8 (H) 0.5 - 1.4 mg/dL    Calcium 8.4 (L) 8.7 - 10.5 mg/dL    Total Protein 6.7 6.0 - 8.4 g/dL    Albumin 3.0 (L) 3.5 - 5.2 g/dL    Total Bilirubin 0.5 0.1 - 1.0 mg/dL    Alkaline Phosphatase 156 (H) 55 - 135 U/L     (H) 10 - 40 U/L    ALT 73 (H) 10 - 44 U/L     Anion Gap 10 8 - 16 mmol/L    eGFR 40 (A) >60 mL/min/1.73 m^2   COVID-19 Rapid Screening   Result Value Ref Range    SARS-CoV-2 RNA, Amplification, Qual Negative Negative   Hemoglobin A1c if not done in past 3 months   Result Value Ref Range    Hemoglobin A1C 7.4 (H) 4.0 - 5.6 %    Estimated Avg Glucose 166 (H) 68 - 131 mg/dL   CBC Auto Differential   Result Value Ref Range    WBC 4.67 3.90 - 12.70 K/uL    RBC 3.76 (L) 4.60 - 6.20 M/uL    Hemoglobin 11.7 (L) 14.0 - 18.0 g/dL    Hematocrit 37.4 (L) 40.0 - 54.0 %     (H) 82 - 98 fL    MCH 31.1 (H) 27.0 - 31.0 pg    MCHC 31.3 (L) 32.0 - 36.0 g/dL    RDW 13.4 11.5 - 14.5 %    Platelets 155 150 - 450 K/uL    MPV 9.4 9.2 - 12.9 fL    Immature Granulocytes 0.2 0.0 - 0.5 %    Gran # (ANC) 3.0 1.8 - 7.7 K/uL    Immature Grans (Abs) 0.01 0.00 - 0.04 K/uL    Lymph # 1.0 1.0 - 4.8 K/uL    Mono # 0.7 0.3 - 1.0 K/uL    Eos # 0.0 0.0 - 0.5 K/uL    Baso # 0.01 0.00 - 0.20 K/uL    nRBC 0 0 /100 WBC    Gran % 64.5 38.0 - 73.0 %    Lymph % 21.0 18.0 - 48.0 %    Mono % 13.9 4.0 - 15.0 %    Eosinophil % 0.2 0.0 - 8.0 %    Basophil % 0.2 0.0 - 1.9 %    Differential Method Automated    Basic Metabolic Panel   Result Value Ref Range    Sodium 135 (L) 136 - 145 mmol/L    Potassium 4.2 3.5 - 5.1 mmol/L    Chloride 105 95 - 110 mmol/L    CO2 21 (L) 23 - 29 mmol/L    Glucose 159 (H) 70 - 110 mg/dL    BUN 26 (H) 8 - 23 mg/dL    Creatinine 1.5 (H) 0.5 - 1.4 mg/dL    Calcium 7.7 (L) 8.7 - 10.5 mg/dL    Anion Gap 9 8 - 16 mmol/L    eGFR 50 (A) >60 mL/min/1.73 m^2   Magnesium   Result Value Ref Range    Magnesium 1.8 1.6 - 2.6 mg/dL   Tacrolimus level   Result Value Ref Range    Tacrolimus Lvl 2.0 (L) 5.0 - 15.0 ng/mL   Echo Saline Bubble? No   Result Value Ref Range    BSA 2.27 m2    TDI SEPTAL 0.09 m/s    LV LATERAL E/E' RATIO 8.45 m/s    LV SEPTAL E/E' RATIO 10.33 m/s    LA WIDTH 3.60 cm    IVC diameter 1.30 cm    Left Ventricular Outflow Tract Mean Velocity 0.65331164742 cm/s    Left  Ventricular Outflow Tract Mean Gradient 2.13 mmHg    TDI LATERAL 0.11 m/s    LVIDd 3.72 3.5 - 6.0 cm    IVS 1.59 (A) 0.6 - 1.1 cm    Posterior Wall 1.78 (A) 0.6 - 1.1 cm    Ao root annulus 3.36 cm    LVIDs 2.45 2.1 - 4.0 cm    FS 34 28 - 44 %    LA volume 57.29 cm3    Sinus 3.49 cm    STJ 3.49 cm    Ascending aorta 3.28 cm    LV mass 254.30 g    LA size 3.62 cm    Left Ventricle Relative Wall Thickness 0.96 cm    AV mean gradient 3 mmHg    AV valve area 2.80 cm2    AV Velocity Ratio 0.75     AV index (prosthetic) 0.75     MV valve area p 1/2 method 3.53 cm2    E/A ratio 0.81     Mean e' 0.10 m/s    E wave deceleration time [id number] msec    IVRT 82.286747635 msec    LVOT diameter 2.18 cm    LVOT area 3.7 cm2    LVOT peak honorio 0.84 m/s    LVOT peak VTI 18.60 cm    Ao peak honorio 1.12 m/s    Ao VTI 24.8 cm    RVOT peak honorio 0.91 m/s    RVOT peak VTI 18.1 cm    LVOT stroke volume 69.39 cm3    AV peak gradient 5 mmHg    PV mean gradient 2.63 mmHg    E/E' ratio 9.30 m/s    MV Peak E Honorio 0.93 m/s    TR Max Honorio 2.53 m/s    MV stenosis pressure 1/2 time [id number] ms    MV Peak A Honorio 1.15 m/s    LV Systolic Volume 21.24 mL    LV Systolic Volume Index 9.6 mL/m2    LV Diastolic Volume 58.87 mL    LV Diastolic Volume Index 26.52 mL/m2    LA Volume Index 25.8 mL/m2    LV Mass Index 115 g/m2    RA Major Axis 4.42 cm    Left Atrium Minor Axis 4.79 cm    Left Atrium Major Axis 5.62 cm    Triscuspid Valve Regurgitation Peak Gradient 26 mmHg    RA Width 2.57 cm    Right Atrial Pressure (from IVC) 3 mmHg    EF 60 %    TV rest pulmonary artery pressure 29 mmHg   POCT glucose   Result Value Ref Range    POCT Glucose 263 (H) 70 - 110 mg/dL   POCT glucose   Result Value Ref Range    POCT Glucose 264 (H) 70 - 110 mg/dL   POCT glucose   Result Value Ref Range    POCT Glucose 192 (H) 70 - 110 mg/dL   POCT glucose   Result Value Ref Range    POCT Glucose 210 (H) 70 - 110 mg/dL   POCT glucose   Result Value Ref Range    POCT  Glucose 157 (H) 70 - 110 mg/dL   POCT glucose   Result Value Ref Range    POCT Glucose 178 (H) 70 - 110 mg/dL   POCT glucose   Result Value Ref Range    POCT Glucose 193 (H) 70 - 110 mg/dL     *Note: Due to a large number of results and/or encounters for the requested time period, some results have not been displayed. A complete set of results can be found in Results Review.        Significant Imaging: X-Ray Ankle Complete Left  Narrative: EXAMINATION:  XR ANKLE COMPLETE 3 VIEW LEFT    CLINICAL HISTORY:  intraop;    COMPARISON:  None  Impression: FINDINGS/  Intraoperative fluoroscopic images were obtained.    Total fluoro time was 1.4 minute and 2 fluoroscopic images were obtained.  See operative report.    Electronically signed by: Ryan Joya MD  Date:    08/04/2022  Time:    15:55  SURG [place] Surgery Fluoro Usage  See OP Notes for results.     IMPRESSION: See OP Notes for results.     This procedure was auto-finalized by: Virtual Radiologist  Echo Saline Bubble? No  · The left ventricle is normal in size with moderate concentric   hypertrophy and normal systolic function.  · The estimated ejection fraction is 60%.  · Normal left ventricular diastolic function.  · Normal right ventricular size with normal right ventricular systolic   function.  · Mild tricuspid regurgitation.  · Normal central venous pressure (3 mmHg).  · The estimated PA systolic pressure is 29 mmHg.     X-Ray Chest AP Portable  Narrative: EXAMINATION:  XR CHEST AP PORTABLE    CLINICAL HISTORY:  Encounter for other preprocedural examination    FINDINGS:  Single view of the chest.  08/15/2021 comparison    Cardiac silhouette is normal.  Aorta demonstrates atherosclerotic disease. The lungs demonstrate no evidence of active disease.  Mild bibasilar atelectasis.  No evidence of pleural effusion or pneumothorax.  Bones appear intact demonstrate scattered degenerative change.  Impression: No acute process seen.    Electronically signed by: Ryan  MD Mahnaz  Date:    08/04/2022  Time:    07:14  X-Ray Tibia Fibula 2 View Left  Narrative: EXAMINATION:  XR TIBIA FIBULA 2 VIEW LEFT; XR ANKLE COMPLETE 3 VIEW LEFT    CLINICAL HISTORY:  XR TIBIA FIBULA 2 VIEW LEFT; XR ANKLE COMPLETE 3 VIEW LEFTUnspecified fall, initial encounter    COMPARISON:  None    FINDINGS:  Four views of the left tibia/fibula and three views of the left ankle were obtained.    Comminuted tibial metaphyseal fracture with angulation and mild displacement.  There is extension to the medial articular surface and involvement of the interosseous membrane between the tibia and fibula.  Transverse fracture of the distal fibula above the lateral malleolus with mild displacement and angulation.  Mildly comminuted proximal fibular fracture.  Moderate joint effusion of the ankle.  Small avulsion injury at the tip of the medial malleolus    Diffuse osteopenia and moderate soft tissue swelling.  Dense vascular calcifications.  Moderate degenerative changes of the ankle and knee joints.  Advanced degenerative changes within the midfoot.  Large plantar calcaneal spur.  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  Impression: See above.    Electronically signed by: Ryan Joya MD  Date:    08/04/2022  Time:    06:59  X-Ray Ankle Complete Left  Narrative: EXAMINATION:  XR TIBIA FIBULA 2 VIEW LEFT; XR ANKLE COMPLETE 3 VIEW LEFT    CLINICAL HISTORY:  XR TIBIA FIBULA 2 VIEW LEFT; XR ANKLE COMPLETE 3 VIEW LEFTUnspecified fall, initial encounter    COMPARISON:  None    FINDINGS:  Four views of the left tibia/fibula and three views of the left ankle were obtained.    Comminuted tibial metaphyseal fracture with angulation and mild displacement.  There is extension to the medial articular surface and involvement of the interosseous membrane between the tibia and fibula.  Transverse fracture of the distal fibula above the lateral malleolus with mild displacement and angulation.   Mildly comminuted proximal fibular fracture.  Moderate joint effusion of the ankle.  Small avulsion injury at the tip of the medial malleolus    Diffuse osteopenia and moderate soft tissue swelling.  Dense vascular calcifications.  Moderate degenerative changes of the ankle and knee joints.  Advanced degenerative changes within the midfoot.  Large plantar calcaneal spur.  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  Impression: See above.    Electronically signed by: Ryan Joya MD  Date:    08/04/2022  Time:    06:59        Assessment/Plan:      * Closed fracture of left tibia and fibula  Patient with distal tib fib fracture   Ortho consulted on case and plans to operate later today  Cont npo  Morphine PRN  Patient with numerous comorbid conditions including  Cad, pvd, copd, diabetes  Poor functional capacity at home with METS < 4  Chest xray unremarkable  Ekg showed first degree AV block  Will obtain stat echo to evaluate LV function  Pt at least moderate risk for cardiac complications    Advance Care Planning     Patient is a full code and sdm is his sister.     8/5/22 POD#1   NWB LLE per ortho recs  Continue pain management  PT/OT rec SNF     Severe central sleep apnea comorbid with prescribed opioid use  cpap qhs       Kidney transplant recipient  Resume tacrolimus  Hold cellcept       Type 2 diabetes mellitus with stable proliferative retinopathy of both eyes, with long-term current use of insulin  Patient's FSGs are uncontrolled due to hyperglycemia on current medication regimen.  Last A1c reviewed-   Lab Results   Component Value Date    HGBA1C 9.1 (H) 06/26/2020     Most recent fingerstick glucose reviewed- No results for input(s): POCTGLUCOSE in the last 24 hours.  Current correctional scale  High  Maintain anti-hyperglycemic dose as follows-   Antihyperglycemics (From admission, onward)            Start     Stop Route Frequency Ordered    08/04/22 0845  insulin  aspart U-100 pen 1-10 Units         -- SubQ Every 6 hours PRN 08/04/22 0745        Hold Oral hypoglycemics while patient is in the hospital.    Coronary artery disease of native artery of native heart with stable angina pectoris  Resume statin   Hold asa and plavix         Osteoarthritis of lumbar spine  Chronic pain  Seeing pain management outpatient       Essential hypertension  Resume home metoprolol  Hydralazine PRN        VTE Risk Mitigation (From admission, onward)         Ordered     IP VTE HIGH RISK PATIENT  Once         08/04/22 1601     Place sequential compression device  Until discontinued         08/04/22 0608                Discharge Planning   LISETH:  8/8/22    Code Status: Full Code   Is the patient medically ready for discharge?:     Reason for patient still in hospital (select all that apply): Patient trending condition, Treatment, Consult recommendations and Pending disposition                     Sherita Christensen NP  Department of Hospital Medicine   [facility] - Med Surg

## 2022-08-06 NOTE — PLAN OF CARE
Pt remains free of injury throughout shift, safety measures maintained. Pain controlled per PRN medications. Poc reviewed with pt, verbalizes understanding. Wound care done, dressings CDI. No s/s of acute distress. Will cont with poc.    Problem: Adult Inpatient Plan of Care  Goal: Optimal Comfort and Wellbeing  Outcome: Ongoing, Progressing     Problem: Fluid Imbalance (Pneumonia)  Goal: Fluid Balance  Outcome: Ongoing, Progressing     Problem: Respiratory Compromise (Pneumonia)  Goal: Effective Oxygenation and Ventilation  Outcome: Ongoing, Progressing     Problem: Diabetes Comorbidity  Goal: Blood Glucose Level Within Targeted Range  Outcome: Ongoing, Progressing     Problem: Skin Injury Risk Increased  Goal: Skin Health and Integrity  Outcome: Ongoing, Progressing     Problem: Impaired Wound Healing  Goal: Optimal Wound Healing  Outcome: Ongoing, Progressing     Problem: Fall Injury Risk  Goal: Absence of Fall and Fall-Related Injury  Outcome: Ongoing, Progressing

## 2022-08-06 NOTE — SUBJECTIVE & OBJECTIVE
Interval History:  No real changes.  Plan debridement today.  Anticoagulation starting this afternoon per hospital Medicine    Medications:  Continuous Infusions:   sodium chloride 0.9% 75 mL/hr at 08/06/22 0721     Scheduled Meds:   atorvastatin  80 mg Oral Daily    chlorhexidine  10 mL Mouth/Throat BID    fluticasone propionate  2 spray Each Nostril Daily    metoprolol tartrate  25 mg Oral BID    mupirocin   Topical (Top) Daily    scopolamine  1 patch Transdermal Q3 Days    sertraline  25 mg Oral Daily    sodium hypochlorite 0.125%   Topical (Top) BID    tacrolimus  1 mg Oral BID     PRN Meds:acetaminophen, albuterol-ipratropium, aluminum-magnesium hydroxide-simethicone, benzonatate, dextrose 10%, dextrose 10%, glucagon (human recombinant), guaiFENesin 100 mg/5 ml, hydrALAZINE, insulin aspart U-100, melatonin, morphine, ondansetron, oxyCODONE-acetaminophen, sodium chloride 0.9%, sodium chloride 0.9%, sodium chloride 0.9%     Review of patient's allergies indicates:  No Known Allergies  Objective:     Vital Signs (Most Recent):  Temp: 97.9 °F (36.6 °C) (08/06/22 0749)  Pulse: 71 (08/06/22 0749)  Resp: 15 (08/06/22 0922)  BP: (!) 164/70 (08/06/22 0749)  SpO2: 96 % (08/06/22 0749)   Vital Signs (24h Range):  Temp:  [97.9 °F (36.6 °C)-98.8 °F (37.1 °C)] 97.9 °F (36.6 °C)  Pulse:  [67-75] 71  Resp:  [15-20] 15  SpO2:  [92 %-96 %] 96 %  BP: (134-178)/(63-94) 164/70     Weight: 113.7 kg (250 lb 10.6 oz)  Body mass index is 34.96 kg/m².    Intake/Output - Last 3 Shifts         08/04 0700  08/05 0659 08/05 0700 08/06 0659 08/06 0700  08/07 0659    P.O.  420 180    I.V. (mL/kg) 43.4 (0.4) 2497.9 (22) 71.4 (0.6)    Total Intake(mL/kg) 43.4 (0.4) 2917.9 (25.7) 251.4 (2.2)    Urine (mL/kg/hr) 1050 (0.4) 800 (0.3) 200 (0.4)    Stool  0     Total Output 1050 800 200    Net -1006.6 +2117.9 +51.4           Stool Occurrence  1 x             Physical Exam  Vitals reviewed.   Constitutional:       Appearance: He is ill-appearing  (chronically).   Skin:     Comments: On the left hip/buttocks is a 6 x 2 cm area of eschar   Neurological:      Mental Status: He is alert.       Significant Labs:  I have reviewed all pertinent lab results within the past 24 hours.  CBC:   Recent Labs   Lab 08/05/22  0610   WBC 4.67   RBC 3.76*   HGB 11.7*   HCT 37.4*      *   MCH 31.1*   MCHC 31.3*     BMP:   Recent Labs   Lab 08/05/22  0610 08/06/22  1010   * 163*   * 135*   K 4.2 4.2    105   CO2 21* 23   BUN 26* 15   CREATININE 1.5* 1.1   CALCIUM 7.7* 7.8*   MG 1.8  --        Significant Diagnostics:  I have reviewed all pertinent imaging results/findings within the past 24 hours.  No new

## 2022-08-06 NOTE — PROGRESS NOTES
Chart was reviewed:    67-year-old male who is admitted with left lower extremity fracture status post surgical intervention.  Has history of kidney transplant in May of 2016.     Most recent Prograf level was 2.0.  On review of the chart it appears he has not been seen by [facility] in almost 2 years.  His last dose was supposed to been 1 mg twice a day.  Currently he is on 0.5 mg twice a day.  Given the vintage of his transplant a Prograf level between 3 and 6 is acceptable.  Will recheck Prograf level tomorrow morning to ensure stability.  If it remains subtherapeutic will plan to increase the Prograf dose.    Creatinine is stable at 1.1.    Formal consult to follow.

## 2022-08-06 NOTE — PROGRESS NOTES
[facility]  Progress Note    Patient Name: Lavelle Ladd  MRN: [id number]  Patient Class: IP- Inpatient   Admission Date: 8/4/2022  Length of Stay: 2 days  Attending Physician: Chapin Proctor MD  Primary Care Provider: Yahir Calzada MD        Subjective:     Principal Problem:Closed fracture of left tibia and fibula        HPI:  Patient is a 67 y.o.  male with a PMHx of HTN, CAD, DM, PVD, kidney transplant, COPD, right BKA, left transmetatarsal amputation who presents to the Emergency Department for evaluation of a fall which onset suddenly 5 hours ago. Patient states he was trying to get into his wheel chair when he slipped and fell. He reported not being able to put weight on it and laid on the ground for 4-5 hours before calling ems. Patient denied any issues with anesthesia with prior surgeries. Currently complains of pain and nausea. Otherwise denies any other issues.     In the ED, ankle xray showed distal tib fib fracture. Ortho notified and plans to operate later this afternoon. Patient was admitted to .               Overview/Hospital Course:  Pt postop day 1 status post closed reduction left tibial and fibular shaft fractures with application of external fixation device. PT/OT rec SNF placement. General  surgery consulted for eval of L lateral buttock wound, hold apixaban for now.   8/6 POD#2 patient lying in bed resting, c/o lower extremity and back pain. Pt s/p bedside debridement of L lateral buttock wound per general surgery. Resume apixaban. Nephrology consulted for subtherapeutic prograf level. Continue supportive management, awaiting SNF placement.      Past Medical History:   Diagnosis Date    DINORAH (acute kidney injury) 9/25/2016    Arthritis     CAD in native artery 7/21/2019    CHF (congestive heart failure)     Chronic obstructive pulmonary disease 9/12/2016    Coronary artery disease involving native coronary artery of native heart without angina  pectoris 2016    CRI (chronic renal insufficiency) 2019     donor kidney transplant for DM 16     Induction with Thymo x3 and IV solumedrol to total 875mg  Kidney Biopsy  2016: 16 glomeruli, ACR type 1 AVR type 2, significant microcirculatory changes, c4d negative, No DSA, 5 to10% fibrosis. Treated with thymo x8 2016- no rejection      Diastolic heart failure     Encounter for blood transfusion     ESRD on RRT since 10/2013 10/29/2013    Biopsy proven diabetic nephropathy and lymphoplasmacytic interstitial infiltrate not c/w with AIN (ddx sjogrens or assoc with tamm-horsefall protein extravasation)     GERD (gastroesophageal reflux disease)     History of hepatitis C, s/p successful Rx w/ SVR12 - 2017    Completed 12 weeks harvoni w/ SVR    Hyperlipidemia     Hypertension     PAD (peripheral artery disease) 2019    PIC line (peripherally inserted central catheter) flush     Prophylactic immunotherapy     Proteinuria     PVD (peripheral vascular disease) 2017    RLE BKA CT 16 Extensive atherosclerotic disease of the aorta and mesenteric arteries.     Renal hypertension     Type 2 diabetes mellitus with diabetic neuropathy, with long-term current use of insulin 2016    Vitamin B12 deficiency        Past Surgical History:   Procedure Laterality Date    AORTOGRAPHY WITH SERIALOGRAPHY N/A 2018    Procedure: LEFT LEG ANGIOGRAM;  Surgeon: Donal Mcdonald MD;  Location: [facility] CATH LAB;  Service: Vascular;  Laterality: N/A;    APPLICATION OF LARGE EXTERNAL FIXATION DEVICE TO TIBIA Left 2022    Procedure: APPLICATION, EXTERNAL FIXATION DEVICE, LARGE, TIBIA;  Surgeon: Good Villa MD;  Location: [facility] OR;  Service: Orthopedics;  Laterality: Left;    av bovine graft      Left UE    AV FISTULA PLACEMENT      left UE    CARDIAC CATHETERIZATION  2015    CLOSED REDUCTION OF INJURY OF TIBIA Left 2022    Procedure: CLOSED REDUCTION,  TIBIA;  Surgeon: Good Villa MD;  Location: [facility] OR;  Service: Orthopedics;  Laterality: Left;  Closed reduction left tibial and fibular shaft fractures    CLOSURE OF WOUND Left 9/24/2018    Procedure: CLOSURE, WOUND;  Surgeon: Karla Wheeler DPM;  Location: [facility] OR;  Service: Podiatry;  Laterality: Left;  Secondary Wound closure, extensive    CLOSURE OF WOUND Left 11/5/2018    Procedure: CLOSURE, WOUND;  Surgeon: Karla Wheeler DPM;  Location: [facility] OR;  Service: Podiatry;  Laterality: Left;    DEBRIDEMENT OF MULTIPLE METATARSAL BONES Left 11/5/2018    Procedure: DEBRIDEMENT, METATARSAL BONE, 2 OR MORE BONES;  Surgeon: Karla Wheeler DPM;  Location: [facility] OR;  Service: Podiatry;  Laterality: Left;    EXCISION OF SKIN Left 9/27/2019    Procedure: EXCISION, SKIN;  Surgeon: Lenard Alarcon MD;  Location: [address];  Service: Plastics;  Laterality: Left;  Plastics set, NIMS monitor, [employer]    FOOT AMPUTATION THROUGH METATARSAL Left 9/21/2018    Procedure: AMPUTATION, FOOT, TRANSMETATARSAL;  Surgeon: Karla Wheeler DPM;  Location: [facility] OR;  Service: Podiatry;  Laterality: Left;    FOOT AMPUTATION THROUGH METATARSAL Left 10/31/2018    Procedure: AMPUTATION, FOOT, TRANSMETATARSAL;  Surgeon: Karla Wheeler DPM;  Location: [facility] OR;  Service: Podiatry;  Laterality: Left;    FOOT AMPUTATION THROUGH METATARSAL Left 11/5/2018    Procedure: AMPUTATION, FOOT, TRANSMETATARSAL;  Surgeon: Karla Wheeler DPM;  Location: [facility] OR;  Service: Podiatry;  Laterality: Left;  revisional transmetatarsal amputation, Left foot    IRRIGATION AND DEBRIDEMENT OF LOWER EXTREMITY Left 10/31/2018    Procedure: IRRIGATION AND DEBRIDEMENT, LOWER EXTREMITY;  Surgeon: Karla Wheeler DPM;  Location: [facility] OR;  Service: Podiatry;  Laterality: Left;    KIDNEY TRANSPLANT  05/21/2016    LEFT HEART CATHETERIZATION Left 7/21/2019    Procedure: CATHETERIZATION, HEART, LEFT;  Surgeon: Andrew Valdez MD;   Location: [facility] CATH LAB;  Service: Cardiology;  Laterality: Left;    LEG AMPUTATION THROUGH KNEE  2011    right LE, started as nail puncture leading to diabetic ulcer    SKIN FULL THICKNESS GRAFT Left 10/7/2019    Procedure: APPLICATION, GRAFT, SKIN, FULL-THICKNESS;  Surgeon: Lenard Alarcon MD;  Location: [facility] OR [address] [place] [address];  Service: Plastics;  Laterality: Left;    SURGICAL REMOVAL OF LESION OF FACE Right 10/7/2019    Procedure: EXCISION, LESION, FACE;  Surgeon: Lenard Alarcon MD;  Location: [facility] OR [address] [place] [address];  Service: Plastics;  Laterality: Right;       Review of patient's allergies indicates:  No Known Allergies    No current facility-administered medications on file prior to encounter.     Current Outpatient Medications on File Prior to Encounter   Medication Sig    amLODIPine (NORVASC) 10 MG tablet Take 1 tablet (10 mg total) by mouth once daily.    aspirin (ECOTRIN) 81 MG EC tablet Take 1 tablet (81 mg total) by mouth once daily.    atorvastatin (LIPITOR) 80 MG tablet Take 1 tablet (80 mg total) by mouth once daily.    clopidogreL (PLAVIX) 75 mg tablet Take 1 tablet (75 mg total) by mouth once daily.    ergocalciferol (ERGOCALCIFEROL) 50,000 unit Cap Take 1 capsule (50,000 Units total) by mouth every 7 days. Take on Mondays    furosemide (LASIX) 40 MG tablet Take 1 tablet (40 mg total) by mouth daily as needed (Leg swelling, Nocturnal SOB).    gabapentin (NEURONTIN) 300 MG capsule Take 300 mg by mouth 3 (three) times daily.    HYDROcodone-acetaminophen (NORCO)  mg per tablet 1 tablet by Per G Tube route every 6 (six) hours as needed for Pain.    hydrocortisone 2.5 % cream Apply topically 2 (two) times daily.    insulin aspart U-100 (NOVOLOG) 100 unit/mL (3 mL) InPn pen Inject 5 units three times a day with meal if BG > 300.    levalbuterol (XOPENEX) 1.25 mg/3 mL nebulizer solution Take 3 mLs (1.25 mg total) by nebulization every 6 to 8 hours as needed for Wheezing or Shortness of Breath.     LIDOcaine (LIDODERM) 5 % Place 1 patch onto the skin daily as needed. 12 hours on and 12 hours off    metoprolol tartrate (LOPRESSOR) 25 MG tablet Take 1 tablet (25 mg total) by mouth 2 (two) times daily.    mycophenolate (CELLCEPT) 250 mg Cap Take 1 capsule (250 mg total) by mouth 2 (two) times daily.    nitroGLYCERIN (NITROSTAT) 0.4 MG SL tablet Place 1 tablet (0.4 mg total) under the tongue every 5 (five) minutes as needed.    ondansetron (ZOFRAN-ODT) 4 MG TbDL Take 1 tablet (4 mg total) by mouth every 8 (eight) hours as needed.    sevelamer carbonate (RENVELA) 800 mg Tab Take 800 mg by mouth 3 (three) times daily with meals.    tacrolimus (PROGRAF) 0.5 MG Cap Take 2 capsules (1 mg total) by mouth every 12 (twelve) hours.    cloNIDine (CATAPRES) 0.1 MG tablet Take 1 tablet (0.1 mg total) by mouth 3 (three) times daily. (Patient not taking: Reported on 8/4/2022)    flash glucose scanning reader (FREESTYLE BRYAN 14 DAY READER) Misc 1 Device by Misc.(Non-Drug; Combo Route) route as needed.    flash glucose sensor (FREESTYLE BRYAN 14 DAY SENSOR) Kit 1 kit by Misc.(Non-Drug; Combo Route) route every 14 (fourteen) days.    ipratropium (ATROVENT) 0.02 % nebulizer solution Take 2.5 mLs (500 mcg total) by nebulization 4 (four) times daily. (Patient not taking: Reported on 8/4/2022)    isosorbide mononitrate (IMDUR) 30 MG 24 hr tablet Take 2 tablets (60 mg total) by mouth once daily. (Patient not taking: Reported on 8/4/2022)    ketoconazole (NIZORAL) 2 % cream Apply topically 2 (two) times daily.    linaCLOtide (LINZESS) 72 mcg Cap capsule Take 1 capsule (72 mcg total) by mouth before breakfast.    naloxone (NARCAN) 4 mg/actuation Spry 1 spray by Nasal route once.    semaglutide (OZEMPIC) 1 mg/dose (2 mg/1.5 mL) PnIj Inject 1 mg under the skin every 7 days. (Patient taking differently: (Saturdays))    sertraline (ZOLOFT) 25 MG tablet Take 1 tablet (25 mg total) by mouth once daily. For depression and  anxiety (Patient not taking: Reported on 2022)    VIOS AEROSOL DELIVERY SYSTEM [place] USE Q 4 H PRN     Family History       Problem Relation (Age of Onset)    Cancer Father    Diabetes Father    Heart failure Father, Mother    Stroke Father          Tobacco Use    Smoking status: Former Smoker     Packs/day: 1.00     Years: 40.00     Pack years: 40.00     Quit date: 2013     Years since quittin.5    Smokeless tobacco: Never Used   Substance and Sexual Activity    Alcohol use: Yes     Alcohol/week: 6.0 standard drinks     Types: 6 Cans of beer per week     Comment: seldom    Drug use: No    Sexual activity: Never     Review of Systems   Constitutional:  Negative for fatigue and fever.   HENT:  Negative for sinus pressure.    Eyes:  Negative for visual disturbance.   Respiratory:  Negative for shortness of breath.    Cardiovascular:  Negative for chest pain.   Gastrointestinal:  Negative for vomiting.   Genitourinary:  Negative for difficulty urinating.   Musculoskeletal:  Positive for arthralgias, back pain and gait problem.   Skin:  Negative for rash.   Neurological:  Negative for headaches.   Psychiatric/Behavioral:  Negative for confusion.    Objective:     Vital Signs (Most Recent):  Temp: 97.8 °F (36.6 °C) (22 1650)  Pulse: 72 (22 1702)  Resp: 16 (22 1650)  BP: 135/71 (22 1702)  SpO2: 95 % (22 1650)   Vital Signs (24h Range):  Temp:  [97.6 °F (36.4 °C)-98.3 °F (36.8 °C)] 97.8 °F (36.6 °C)  Pulse:  [70-75] 72  Resp:  [15-20] 16  SpO2:  [92 %-96 %] 95 %  BP: (124-195)/(61-94) 135/71     Weight: 113.7 kg (250 lb 10.6 oz)  Body mass index is 34.96 kg/m².    Physical Exam  Constitutional:       General: He is not in acute distress.     Appearance: He is well-developed. He is obese. He is not diaphoretic.   HENT:      Head: Normocephalic and atraumatic.   Eyes:      Pupils: Pupils are equal, round, and reactive to light.   Cardiovascular:      Rate and Rhythm: Normal  rate and regular rhythm.      Heart sounds: Normal heart sounds. No murmur heard.    No friction rub. No gallop.   Pulmonary:      Effort: Pulmonary effort is normal. No respiratory distress.      Breath sounds: Normal breath sounds. No stridor. No wheezing or rales.   Abdominal:      General: Bowel sounds are normal. There is no distension.      Palpations: Abdomen is soft. There is no mass.      Tenderness: There is no abdominal tenderness. There is no guarding.   Musculoskeletal:      Comments: Right bka, left transmetatarsal amputation   Skin:     General: Skin is warm.      Findings: No erythema.   Neurological:      Mental Status: He is alert and oriented to person, place, and time.         CRANIAL NERVES     CN III, IV, VI   Pupils are equal, round, and reactive to light.     Significant Labs:   Results for orders placed or performed during the hospital encounter of 08/04/22   CBC auto differential   Result Value Ref Range    WBC 5.41 3.90 - 12.70 K/uL    RBC 4.28 (L) 4.60 - 6.20 M/uL    Hemoglobin 13.0 (L) 14.0 - 18.0 g/dL    Hematocrit 42.6 40.0 - 54.0 %     (H) 82 - 98 fL    MCH 30.4 27.0 - 31.0 pg    MCHC 30.5 (L) 32.0 - 36.0 g/dL    RDW 13.5 11.5 - 14.5 %    Platelets 201 150 - 450 K/uL    MPV 9.2 9.2 - 12.9 fL    Immature Granulocytes 0.4 0.0 - 0.5 %    Gran # (ANC) 3.7 1.8 - 7.7 K/uL    Immature Grans (Abs) 0.02 0.00 - 0.04 K/uL    Lymph # 1.0 1.0 - 4.8 K/uL    Mono # 0.6 0.3 - 1.0 K/uL    Eos # 0.0 0.0 - 0.5 K/uL    Baso # 0.02 0.00 - 0.20 K/uL    nRBC 0 0 /100 WBC    Gran % 69.1 38.0 - 73.0 %    Lymph % 18.1 18.0 - 48.0 %    Mono % 11.8 4.0 - 15.0 %    Eosinophil % 0.2 0.0 - 8.0 %    Basophil % 0.4 0.0 - 1.9 %    Differential Method Automated    Comprehensive metabolic panel   Result Value Ref Range    Sodium 136 136 - 145 mmol/L    Potassium 4.9 3.5 - 5.1 mmol/L    Chloride 102 95 - 110 mmol/L    CO2 24 23 - 29 mmol/L    Glucose 250 (H) 70 - 110 mg/dL    BUN 30 (H) 8 - 23 mg/dL    Creatinine  1.8 (H) 0.5 - 1.4 mg/dL    Calcium 8.4 (L) 8.7 - 10.5 mg/dL    Total Protein 6.7 6.0 - 8.4 g/dL    Albumin 3.0 (L) 3.5 - 5.2 g/dL    Total Bilirubin 0.5 0.1 - 1.0 mg/dL    Alkaline Phosphatase 156 (H) 55 - 135 U/L     (H) 10 - 40 U/L    ALT 73 (H) 10 - 44 U/L    Anion Gap 10 8 - 16 mmol/L    eGFR 40 (A) >60 mL/min/1.73 m^2   COVID-19 Rapid Screening   Result Value Ref Range    SARS-CoV-2 RNA, Amplification, Qual Negative Negative   Hemoglobin A1c if not done in past 3 months   Result Value Ref Range    Hemoglobin A1C 7.4 (H) 4.0 - 5.6 %    Estimated Avg Glucose 166 (H) 68 - 131 mg/dL   CBC Auto Differential   Result Value Ref Range    WBC 4.67 3.90 - 12.70 K/uL    RBC 3.76 (L) 4.60 - 6.20 M/uL    Hemoglobin 11.7 (L) 14.0 - 18.0 g/dL    Hematocrit 37.4 (L) 40.0 - 54.0 %     (H) 82 - 98 fL    MCH 31.1 (H) 27.0 - 31.0 pg    MCHC 31.3 (L) 32.0 - 36.0 g/dL    RDW 13.4 11.5 - 14.5 %    Platelets 155 150 - 450 K/uL    MPV 9.4 9.2 - 12.9 fL    Immature Granulocytes 0.2 0.0 - 0.5 %    Gran # (ANC) 3.0 1.8 - 7.7 K/uL    Immature Grans (Abs) 0.01 0.00 - 0.04 K/uL    Lymph # 1.0 1.0 - 4.8 K/uL    Mono # 0.7 0.3 - 1.0 K/uL    Eos # 0.0 0.0 - 0.5 K/uL    Baso # 0.01 0.00 - 0.20 K/uL    nRBC 0 0 /100 WBC    Gran % 64.5 38.0 - 73.0 %    Lymph % 21.0 18.0 - 48.0 %    Mono % 13.9 4.0 - 15.0 %    Eosinophil % 0.2 0.0 - 8.0 %    Basophil % 0.2 0.0 - 1.9 %    Differential Method Automated    Basic Metabolic Panel   Result Value Ref Range    Sodium 135 (L) 136 - 145 mmol/L    Potassium 4.2 3.5 - 5.1 mmol/L    Chloride 105 95 - 110 mmol/L    CO2 21 (L) 23 - 29 mmol/L    Glucose 159 (H) 70 - 110 mg/dL    BUN 26 (H) 8 - 23 mg/dL    Creatinine 1.5 (H) 0.5 - 1.4 mg/dL    Calcium 7.7 (L) 8.7 - 10.5 mg/dL    Anion Gap 9 8 - 16 mmol/L    eGFR 50 (A) >60 mL/min/1.73 m^2   Magnesium   Result Value Ref Range    Magnesium 1.8 1.6 - 2.6 mg/dL   Tacrolimus level   Result Value Ref Range    Tacrolimus Lvl 2.0 (L) 5.0 - 15.0 ng/mL    Comprehensive Metabolic Panel   Result Value Ref Range    Sodium 135 (L) 136 - 145 mmol/L    Potassium 4.2 3.5 - 5.1 mmol/L    Chloride 105 95 - 110 mmol/L    CO2 23 23 - 29 mmol/L    Glucose 163 (H) 70 - 110 mg/dL    BUN 15 8 - 23 mg/dL    Creatinine 1.1 0.5 - 1.4 mg/dL    Calcium 7.8 (L) 8.7 - 10.5 mg/dL    Total Protein 5.3 (L) 6.0 - 8.4 g/dL    Albumin 2.3 (L) 3.5 - 5.2 g/dL    Total Bilirubin 0.7 0.1 - 1.0 mg/dL    Alkaline Phosphatase 120 55 - 135 U/L    AST 35 10 - 40 U/L    ALT 19 10 - 44 U/L    Anion Gap 7 (L) 8 - 16 mmol/L    eGFR >60 >60 mL/min/1.73 m^2   Echo Saline Bubble? No   Result Value Ref Range    BSA 2.27 m2    TDI SEPTAL 0.09 m/s    LV LATERAL E/E' RATIO 8.45 m/s    LV SEPTAL E/E' RATIO 10.33 m/s    LA WIDTH 3.60 cm    IVC diameter 1.30 cm    Left Ventricular Outflow Tract Mean Velocity 0.92831667447 cm/s    Left Ventricular Outflow Tract Mean Gradient 2.13 mmHg    TDI LATERAL 0.11 m/s    LVIDd 3.72 3.5 - 6.0 cm    IVS 1.59 (A) 0.6 - 1.1 cm    Posterior Wall 1.78 (A) 0.6 - 1.1 cm    Ao root annulus 3.36 cm    LVIDs 2.45 2.1 - 4.0 cm    FS 34 28 - 44 %    LA volume 57.29 cm3    Sinus 3.49 cm    STJ 3.49 cm    Ascending aorta 3.28 cm    LV mass 254.30 g    LA size 3.62 cm    Left Ventricle Relative Wall Thickness 0.96 cm    AV mean gradient 3 mmHg    AV valve area 2.80 cm2    AV Velocity Ratio 0.75     AV index (prosthetic) 0.75     MV valve area p 1/2 method 3.53 cm2    E/A ratio 0.81     Mean e' 0.10 m/s    E wave deceleration time [id number] msec    IVRT 82.470645873 msec    LVOT diameter 2.18 cm    LVOT area 3.7 cm2    LVOT peak honorio 0.84 m/s    LVOT peak VTI 18.60 cm    Ao peak honorio 1.12 m/s    Ao VTI 24.8 cm    RVOT peak honorio 0.91 m/s    RVOT peak VTI 18.1 cm    LVOT stroke volume 69.39 cm3    AV peak gradient 5 mmHg    PV mean gradient 2.63 mmHg    E/E' ratio 9.30 m/s    MV Peak E Honorio 0.93 m/s    TR Max Honorio 2.53 m/s    MV stenosis pressure 1/2 time [id number] ms    MV Peak A  Honorio 1.15 m/s    LV Systolic Volume 21.24 mL    LV Systolic Volume Index 9.6 mL/m2    LV Diastolic Volume 58.87 mL    LV Diastolic Volume Index 26.52 mL/m2    LA Volume Index 25.8 mL/m2    LV Mass Index 115 g/m2    RA Major Axis 4.42 cm    Left Atrium Minor Axis 4.79 cm    Left Atrium Major Axis 5.62 cm    Triscuspid Valve Regurgitation Peak Gradient 26 mmHg    RA Width 2.57 cm    Right Atrial Pressure (from IVC) 3 mmHg    EF 60 %    TV rest pulmonary artery pressure 29 mmHg   POCT glucose   Result Value Ref Range    POCT Glucose 263 (H) 70 - 110 mg/dL   POCT glucose   Result Value Ref Range    POCT Glucose 264 (H) 70 - 110 mg/dL   POCT glucose   Result Value Ref Range    POCT Glucose 192 (H) 70 - 110 mg/dL   POCT glucose   Result Value Ref Range    POCT Glucose 210 (H) 70 - 110 mg/dL   POCT glucose   Result Value Ref Range    POCT Glucose 157 (H) 70 - 110 mg/dL   POCT glucose   Result Value Ref Range    POCT Glucose 178 (H) 70 - 110 mg/dL   POCT glucose   Result Value Ref Range    POCT Glucose 193 (H) 70 - 110 mg/dL   POCT glucose   Result Value Ref Range    POCT Glucose 189 (H) 70 - 110 mg/dL   POCT glucose   Result Value Ref Range    POCT Glucose 183 (H) 70 - 110 mg/dL   POCT glucose   Result Value Ref Range    POCT Glucose 162 (H) 70 - 110 mg/dL   POCT glucose   Result Value Ref Range    POCT Glucose 160 (H) 70 - 110 mg/dL     *Note: Due to a large number of results and/or encounters for the requested time period, some results have not been displayed. A complete set of results can be found in Results Review.        Significant Imaging: X-Ray Ankle Complete Left  Narrative: EXAMINATION:  XR ANKLE COMPLETE 3 VIEW LEFT    CLINICAL HISTORY:  intraop;    COMPARISON:  None  Impression: FINDINGS/  Intraoperative fluoroscopic images were obtained.    Total fluoro time was 1.4 minute and 2 fluoroscopic images were obtained.  See operative report.    Electronically signed by: Ryan Joya  MD  Date:    08/04/2022  Time:    15:55  SURG [place] Surgery Fluoro Usage  See OP Notes for results.     IMPRESSION: See OP Notes for results.     This procedure was auto-finalized by: Virtual Radiologist  Echo Saline Bubble? No  · The left ventricle is normal in size with moderate concentric   hypertrophy and normal systolic function.  · The estimated ejection fraction is 60%.  · Normal left ventricular diastolic function.  · Normal right ventricular size with normal right ventricular systolic   function.  · Mild tricuspid regurgitation.  · Normal central venous pressure (3 mmHg).  · The estimated PA systolic pressure is 29 mmHg.     X-Ray Chest AP Portable  Narrative: EXAMINATION:  XR CHEST AP PORTABLE    CLINICAL HISTORY:  Encounter for other preprocedural examination    FINDINGS:  Single view of the chest.  08/15/2021 comparison    Cardiac silhouette is normal.  Aorta demonstrates atherosclerotic disease. The lungs demonstrate no evidence of active disease.  Mild bibasilar atelectasis.  No evidence of pleural effusion or pneumothorax.  Bones appear intact demonstrate scattered degenerative change.  Impression: No acute process seen.    Electronically signed by: Ryan Joya MD  Date:    08/04/2022  Time:    07:14  X-Ray Tibia Fibula 2 View Left  Narrative: EXAMINATION:  XR TIBIA FIBULA 2 VIEW LEFT; XR ANKLE COMPLETE 3 VIEW LEFT    CLINICAL HISTORY:  XR TIBIA FIBULA 2 VIEW LEFT; XR ANKLE COMPLETE 3 VIEW LEFTUnspecified fall, initial encounter    COMPARISON:  None    FINDINGS:  Four views of the left tibia/fibula and three views of the left ankle were obtained.    Comminuted tibial metaphyseal fracture with angulation and mild displacement.  There is extension to the medial articular surface and involvement of the interosseous membrane between the tibia and fibula.  Transverse fracture of the distal fibula above the lateral malleolus with mild displacement and angulation.  Mildly comminuted proximal fibular fracture.   Moderate joint effusion of the ankle.  Small avulsion injury at the tip of the medial malleolus    Diffuse osteopenia and moderate soft tissue swelling.  Dense vascular calcifications.  Moderate degenerative changes of the ankle and knee joints.  Advanced degenerative changes within the midfoot.  Large plantar calcaneal spur.  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  Impression: See above.    Electronically signed by: Ryan Joya MD  Date:    08/04/2022  Time:    06:59  X-Ray Ankle Complete Left  Narrative: EXAMINATION:  XR TIBIA FIBULA 2 VIEW LEFT; XR ANKLE COMPLETE 3 VIEW LEFT    CLINICAL HISTORY:  XR TIBIA FIBULA 2 VIEW LEFT; XR ANKLE COMPLETE 3 VIEW LEFTUnspecified fall, initial encounter    COMPARISON:  None    FINDINGS:  Four views of the left tibia/fibula and three views of the left ankle were obtained.    Comminuted tibial metaphyseal fracture with angulation and mild displacement.  There is extension to the medial articular surface and involvement of the interosseous membrane between the tibia and fibula.  Transverse fracture of the distal fibula above the lateral malleolus with mild displacement and angulation.  Mildly comminuted proximal fibular fracture.  Moderate joint effusion of the ankle.  Small avulsion injury at the tip of the medial malleolus    Diffuse osteopenia and moderate soft tissue swelling.  Dense vascular calcifications.  Moderate degenerative changes of the ankle and knee joints.  Advanced degenerative changes within the midfoot.  Large plantar calcaneal spur.  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  Impression: See above.    Electronically signed by: Ryan Joya MD  Date:    08/04/2022  Time:    06:59        Assessment/Plan:      * Closed fracture of left tibia and fibula  Patient with distal tib fib fracture   Ortho consulted on case and plans to operate later today  Cont npo  Morphine PRN  Patient  with numerous comorbid conditions including  Cad, pvd, copd, diabetes  Poor functional capacity at home with METS < 4  Chest xray unremarkable  Ekg showed first degree AV block  Will obtain stat echo to evaluate LV function  Pt at least moderate risk for cardiac complications    Advance Care Planning     Patient is a full code and sdm is his sister.     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  Last A1c reviewed-   Lab Results   Component Value Date    HGBA1C 9.1 (H) 06/26/2020     Most recent fingerstick glucose reviewed- No results for input(s): POCTGLUCOSE in the last 24 hours.  Current correctional scale  High  Maintain anti-hyperglycemic dose as follows-   Antihyperglycemics (From admission, onward)            Start     Stop Route Frequency Ordered    08/04/22 0845  insulin aspart U-100 pen 1-10 Units         -- SubQ Every 6 hours PRN 08/04/22 0745        Hold Oral hypoglycemics while patient is in the hospital.    Coronary artery disease of native artery of native heart with stable angina pectoris  Resume statin   Hold asa and plavix         Osteoarthritis of lumbar spine  Chronic pain  Seeing pain management outpatient       Essential hypertension  Resume home metoprolol  Hydralazine PRN        VTE Risk Mitigation (From admission, onward)         Ordered     IP VTE HIGH RISK PATIENT  Once         08/04/22 1601     Place sequential compression device  Until discontinued         08/04/22  0608                Discharge Planning   LISETH:  8/8/22    Code Status: Full Code   Is the patient medically ready for discharge?:     Reason for patient still in hospital (select all that apply): Patient trending condition, Treatment, Consult recommendations and Pending disposition                     Sherita Christensen NP  Department of Hospital Medicine   [facility] - Med Surg

## 2022-08-06 NOTE — PROGRESS NOTES
[facility]  General Surgery  Progress Note    Subjective:     History of Present Illness:  No notes on file    Post-Op Info:  Procedure(s) (LRB):  APPLICATION, EXTERNAL FIXATION DEVICE, LARGE, TIBIA (Left)  CLOSED REDUCTION, TIBIA (Left)   2 Days Post-Op     Interval History:  No real changes.  Plan debridement today.  Anticoagulation starting this afternoon per hospital Medicine    Medications:  Continuous Infusions:   sodium chloride 0.9% 75 mL/hr at 08/06/22 0721     Scheduled Meds:   atorvastatin  80 mg Oral Daily    chlorhexidine  10 mL Mouth/Throat BID    fluticasone propionate  2 spray Each Nostril Daily    metoprolol tartrate  25 mg Oral BID    mupirocin   Topical (Top) Daily    scopolamine  1 patch Transdermal Q3 Days    sertraline  25 mg Oral Daily    sodium hypochlorite 0.125%   Topical (Top) BID    tacrolimus  1 mg Oral BID     PRN Meds:acetaminophen, albuterol-ipratropium, aluminum-magnesium hydroxide-simethicone, benzonatate, dextrose 10%, dextrose 10%, glucagon (human recombinant), guaiFENesin 100 mg/5 ml, hydrALAZINE, insulin aspart U-100, melatonin, morphine, ondansetron, oxyCODONE-acetaminophen, sodium chloride 0.9%, sodium chloride 0.9%, sodium chloride 0.9%     Review of patient's allergies indicates:  No Known Allergies  Objective:     Vital Signs (Most Recent):  Temp: 97.9 °F (36.6 °C) (08/06/22 0749)  Pulse: 71 (08/06/22 0749)  Resp: 15 (08/06/22 0922)  BP: (!) 164/70 (08/06/22 0749)  SpO2: 96 % (08/06/22 0749)   Vital Signs (24h Range):  Temp:  [97.9 °F (36.6 °C)-98.8 °F (37.1 °C)] 97.9 °F (36.6 °C)  Pulse:  [67-75] 71  Resp:  [15-20] 15  SpO2:  [92 %-96 %] 96 %  BP: (134-178)/(63-94) 164/70     Weight: 113.7 kg (250 lb 10.6 oz)  Body mass index is 34.96 kg/m².    Intake/Output - Last 3 Shifts         08/04 0700 08/05 0659 08/05 0700 08/06 0659 08/06 0700 08/07 0659    P.O.  420 180    I.V. (mL/kg) 43.4 (0.4) 2497.9 (22) 71.4 (0.6)    Total Intake(mL/kg) 43.4 (0.4) 2917.9  (25.7) 251.4 (2.2)    Urine (mL/kg/hr) 1050 (0.4) 800 (0.3) 200 (0.4)    Stool  0     Total Output 1050 800 200    Net -1006.6 +2117.9 +51.4           Stool Occurrence  1 x             Physical Exam  Vitals reviewed.   Constitutional:       Appearance: He is ill-appearing (chronically).   Skin:     Comments: On the left hip/buttocks is a 6 x 2 cm area of eschar   Neurological:      Mental Status: He is alert.       Significant Labs:  I have reviewed all pertinent lab results within the past 24 hours.  CBC:   Recent Labs   Lab 08/05/22  0610   WBC 4.67   RBC 3.76*   HGB 11.7*   HCT 37.4*      *   MCH 31.1*   MCHC 31.3*     BMP:   Recent Labs   Lab 08/05/22  0610 08/06/22  1010   * 163*   * 135*   K 4.2 4.2    105   CO2 21* 23   BUN 26* 15   CREATININE 1.5* 1.1   CALCIUM 7.7* 7.8*   MG 1.8  --        Significant Diagnostics:  I have reviewed all pertinent imaging results/findings within the past 24 hours.  No new    Assessment/Plan:     Pressure injury of left hip, stage 2  Debridement today.    Okay to start Eliquis this afternoon        Ha Juarez MD  General Surgery  [facility] - Med Surg

## 2022-08-06 NOTE — PROGRESS NOTES
Lavelle Ladd is a 67 y.o. male patient.     Subjective   The patient is 2nd postop day from closed reduction and external fixation of left tibial and fibular fractures.  Complains of having a bad night due to difficulty breathing.  Reports he had chest pain and shortness of breath.  His nurse is at the bedside she says that he is having a lot of upper airway congestion.  Cannot tolerate BiPAP.  He was on 2 L of oxygen for a while.  Currently is complaining of pain in the left leg not controlled with current analgesics.    1. Closed fracture of left tibia and fibula, initial encounter    2. Fall    3. Fracture tibia/fibula, left, closed, initial encounter    4. Preop examination    5. Coronary artery disease    6. Closed fracture of left tibia and fibula with routine healing, subsequent encounter    7. Pressure injury of left hip, stage 2      Past Medical History:   Diagnosis Date    DINORAH (acute kidney injury) 2016    Arthritis     CAD in native artery 2019    CHF (congestive heart failure)     Chronic obstructive pulmonary disease 2016    Coronary artery disease involving native coronary artery of native heart without angina pectoris 2016    CRI (chronic renal insufficiency) 2019     donor kidney transplant for DM 16     Induction with Thymo x3 and IV solumedrol to total 875mg  Kidney Biopsy  2016: 16 glomeruli, ACR type 1 AVR type 2, significant microcirculatory changes, c4d negative, No DSA, 5 to10% fibrosis. Treated with thymo x8 2016- no rejection      Diastolic heart failure     Encounter for blood transfusion     ESRD on RRT since 10/2013 10/29/2013    Biopsy proven diabetic nephropathy and lymphoplasmacytic interstitial infiltrate not c/w with AIN (ddx sjogrens or assoc with tamm-horsefall protein extravasation)     GERD (gastroesophageal reflux disease)     History of hepatitis C, s/p successful Rx w/ SVR12 - 2017    Completed 12  weeks harvoni w/ SVR    Hyperlipidemia     Hypertension     PAD (peripheral artery disease) 7/21/2019    PIC line (peripherally inserted central catheter) flush     Prophylactic immunotherapy     Proteinuria     PVD (peripheral vascular disease) 6/26/2017    RLE BKA CT 12/11/16 Extensive atherosclerotic disease of the aorta and mesenteric arteries.     Renal hypertension     Type 2 diabetes mellitus with diabetic neuropathy, with long-term current use of insulin 12/1/2016    Vitamin B12 deficiency      Past Surgical History Pertinent Negatives:   Procedure Date Noted    CARDIAC SURGERY 12/08/2015     Scheduled Meds:   atorvastatin  80 mg Oral Daily    chlorhexidine  10 mL Mouth/Throat BID    fluticasone propionate  2 spray Each Nostril Daily    LIDOcaine (PF) 10 mg/ml (1%)  10 mL Other Once    metoprolol tartrate  25 mg Oral BID    mupirocin   Topical (Top) Daily    scopolamine  1 patch Transdermal Q3 Days    sertraline  25 mg Oral Daily    sodium hypochlorite 0.125%   Topical (Top) BID    tacrolimus  1 mg Oral BID     Continuous Infusions:   sodium chloride 0.9% 75 mL/hr at 08/06/22 0721     PRN Meds:acetaminophen, albuterol-ipratropium, aluminum-magnesium hydroxide-simethicone, benzonatate, dextrose 10%, dextrose 10%, glucagon (human recombinant), guaiFENesin 100 mg/5 ml, hydrALAZINE, insulin aspart U-100, melatonin, morphine, ondansetron, oxyCODONE-acetaminophen, sodium chloride 0.9%, sodium chloride 0.9%, sodium chloride 0.9%    Review of patient's allergies indicates:  No Known Allergies  Active Hospital Problems    Diagnosis  POA    *Closed fracture of left tibia and fibula [S82.202A, S82.402A]  Yes    Pressure injury of left hip, stage 2 [L89.222]  Yes    Severe central sleep apnea comorbid with prescribed opioid use [F11.90, G47.37]  Yes    Kidney transplant recipient [Z94.0]  Not Applicable    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     Blood pressure (!) 164/70, pulse 71, temperature 97.9 °F (36.6 °C), temperature source Oral, resp. rate 15, height 5' 11" (1.803 m), weight 113.7 kg (250 lb 10.6 oz), SpO2 96 %.    Objective   Obese male in distress complaining of left leg pain.    The left lower extremity has the external fixation device intact.  Dressings are intact.  He has the transmetatarsal amputation.  No drainage, normal color at the heel and hindfoot area.    Glucose 183     Assessment & Plan   Second postop day from external fixation of left tibia.  Await medical team's evaluation.  We will change his oral analgesics from hydrocodone to oxycodone.  Continue with mobilization efforts.  Await rehab placement.  General surgery is going to debride the eschar in the left lower back area today.       8/6/2022        [place] MD, FAAOS [facility], Orthopedic Trauma Service  [place]    "

## 2022-08-06 NOTE — PLAN OF CARE
VSS. Fall precautions maintained.   Pain is well controlled.   Dressing intact and clean. External fixator intact on left leg. RBKA  Tolerating IV antibiotics.  Blood sugar monitored. Covered per SS as ordered.  Repositions self.  All needs met. Will continue to monitor.

## 2022-08-06 NOTE — SUBJECTIVE & OBJECTIVE
Past Medical History:   Diagnosis Date    DINORAH (acute kidney injury) 2016    Arthritis     CAD in native artery 2019    CHF (congestive heart failure)     Chronic obstructive pulmonary disease 2016    Coronary artery disease involving native coronary artery of native heart without angina pectoris 2016    CRI (chronic renal insufficiency) 2019     donor kidney transplant for DM 16     Induction with Thymo x3 and IV solumedrol to total 875mg  Kidney Biopsy  2016: 16 glomeruli, ACR type 1 AVR type 2, significant microcirculatory changes, c4d negative, No DSA, 5 to10% fibrosis. Treated with thymo x8 2016- no rejection      Diastolic heart failure     Encounter for blood transfusion     ESRD on RRT since 10/2013 10/29/2013    Biopsy proven diabetic nephropathy and lymphoplasmacytic interstitial infiltrate not c/w with AIN (ddx sjogrens or assoc with tamm-horsefall protein extravasation)     GERD (gastroesophageal reflux disease)     History of hepatitis C, s/p successful Rx w/ SVR12 - 2017    Completed 12 weeks harvoni w/ SVR    Hyperlipidemia     Hypertension     PAD (peripheral artery disease) 2019    PIC line (peripherally inserted central catheter) flush     Prophylactic immunotherapy     Proteinuria     PVD (peripheral vascular disease) 2017    RLE BKA CT 16 Extensive atherosclerotic disease of the aorta and mesenteric arteries.     Renal hypertension     Type 2 diabetes mellitus with diabetic neuropathy, with long-term current use of insulin 2016    Vitamin B12 deficiency        Past Surgical History:   Procedure Laterality Date    AORTOGRAPHY WITH SERIALOGRAPHY N/A 2018    Procedure: LEFT LEG ANGIOGRAM;  Surgeon: Donal Mcdonald MD;  Location: [facility] CATH LAB;  Service: Vascular;  Laterality: N/A;    APPLICATION OF LARGE EXTERNAL FIXATION DEVICE TO TIBIA Left 2022    Procedure: APPLICATION, EXTERNAL FIXATION  DEVICE, LARGE, TIBIA;  Surgeon: Good Villa MD;  Location: [facility] OR;  Service: Orthopedics;  Laterality: Left;    av bovine graft      Left UE    AV FISTULA PLACEMENT      left UE    CARDIAC CATHETERIZATION  02/2015    CLOSED REDUCTION OF INJURY OF TIBIA Left 8/4/2022    Procedure: CLOSED REDUCTION, TIBIA;  Surgeon: Good Villa MD;  Location: [facility] OR;  Service: Orthopedics;  Laterality: Left;  Closed reduction left tibial and fibular shaft fractures    CLOSURE OF WOUND Left 9/24/2018    Procedure: CLOSURE, WOUND;  Surgeon: Karla Wheeler DPM;  Location: [facility] OR;  Service: Podiatry;  Laterality: Left;  Secondary Wound closure, extensive    CLOSURE OF WOUND Left 11/5/2018    Procedure: CLOSURE, WOUND;  Surgeon: Karla Wheeler DPM;  Location: [facility] OR;  Service: Podiatry;  Laterality: Left;    DEBRIDEMENT OF MULTIPLE METATARSAL BONES Left 11/5/2018    Procedure: DEBRIDEMENT, METATARSAL BONE, 2 OR MORE BONES;  Surgeon: Karla Wheeler DPM;  Location: [facility] OR;  Service: Podiatry;  Laterality: Left;    EXCISION OF SKIN Left 9/27/2019    Procedure: EXCISION, SKIN;  Surgeon: Lenard Alarcon MD;  Location: [address];  Service: Plastics;  Laterality: Left;  Plastics set, NIMS monitor, [employer]    FOOT AMPUTATION THROUGH METATARSAL Left 9/21/2018    Procedure: AMPUTATION, FOOT, TRANSMETATARSAL;  Surgeon: Karla Wheeler DPM;  Location: [facility] OR;  Service: Podiatry;  Laterality: Left;    FOOT AMPUTATION THROUGH METATARSAL Left 10/31/2018    Procedure: AMPUTATION, FOOT, TRANSMETATARSAL;  Surgeon: Karla Wheeler DPM;  Location: [facility] OR;  Service: Podiatry;  Laterality: Left;    FOOT AMPUTATION THROUGH METATARSAL Left 11/5/2018    Procedure: AMPUTATION, FOOT, TRANSMETATARSAL;  Surgeon: Karla Wheeler DPM;  Location: [facility] OR;  Service: Podiatry;  Laterality: Left;  revisional transmetatarsal amputation, Left foot    IRRIGATION AND DEBRIDEMENT OF LOWER EXTREMITY Left 10/31/2018     Procedure: IRRIGATION AND DEBRIDEMENT, LOWER EXTREMITY;  Surgeon: Karla Wheeler DPM;  Location: [facility] OR;  Service: Podiatry;  Laterality: Left;    KIDNEY TRANSPLANT  05/21/2016    LEFT HEART CATHETERIZATION Left 7/21/2019    Procedure: CATHETERIZATION, HEART, LEFT;  Surgeon: Andrew Valdez MD;  Location: [facility] CATH LAB;  Service: Cardiology;  Laterality: Left;    LEG AMPUTATION THROUGH KNEE  2011    right LE, started as nail puncture leading to diabetic ulcer    SKIN FULL THICKNESS GRAFT Left 10/7/2019    Procedure: APPLICATION, GRAFT, SKIN, FULL-THICKNESS;  Surgeon: Lenard Alarcon MD;  Location: [address];  Service: Plastics;  Laterality: Left;    SURGICAL REMOVAL OF LESION OF FACE Right 10/7/2019    Procedure: EXCISION, LESION, FACE;  Surgeon: Lenard Alarcon MD;  Location: [facility] OR [address] [place] [address];  Service: Plastics;  Laterality: Right;       Review of patient's allergies indicates:  No Known Allergies    No current facility-administered medications on file prior to encounter.     Current Outpatient Medications on File Prior to Encounter   Medication Sig    amLODIPine (NORVASC) 10 MG tablet Take 1 tablet (10 mg total) by mouth once daily.    aspirin (ECOTRIN) 81 MG EC tablet Take 1 tablet (81 mg total) by mouth once daily.    atorvastatin (LIPITOR) 80 MG tablet Take 1 tablet (80 mg total) by mouth once daily.    clopidogreL (PLAVIX) 75 mg tablet Take 1 tablet (75 mg total) by mouth once daily.    ergocalciferol (ERGOCALCIFEROL) 50,000 unit Cap Take 1 capsule (50,000 Units total) by mouth every 7 days. Take on Mondays    furosemide (LASIX) 40 MG tablet Take 1 tablet (40 mg total) by mouth daily as needed (Leg swelling, Nocturnal SOB).    gabapentin (NEURONTIN) 300 MG capsule Take 300 mg by mouth 3 (three) times daily.    HYDROcodone-acetaminophen (NORCO)  mg per tablet 1 tablet by Per G Tube route every 6 (six) hours as needed for Pain.    hydrocortisone 2.5 % cream Apply topically 2 (two)  times daily.    insulin aspart U-100 (NOVOLOG) 100 unit/mL (3 mL) InPn pen Inject 5 units three times a day with meal if BG > 300.    levalbuterol (XOPENEX) 1.25 mg/3 mL nebulizer solution Take 3 mLs (1.25 mg total) by nebulization every 6 to 8 hours as needed for Wheezing or Shortness of Breath.    LIDOcaine (LIDODERM) 5 % Place 1 patch onto the skin daily as needed. 12 hours on and 12 hours off    metoprolol tartrate (LOPRESSOR) 25 MG tablet Take 1 tablet (25 mg total) by mouth 2 (two) times daily.    mycophenolate (CELLCEPT) 250 mg Cap Take 1 capsule (250 mg total) by mouth 2 (two) times daily.    nitroGLYCERIN (NITROSTAT) 0.4 MG SL tablet Place 1 tablet (0.4 mg total) under the tongue every 5 (five) minutes as needed.    ondansetron (ZOFRAN-ODT) 4 MG TbDL Take 1 tablet (4 mg total) by mouth every 8 (eight) hours as needed.    sevelamer carbonate (RENVELA) 800 mg Tab Take 800 mg by mouth 3 (three) times daily with meals.    tacrolimus (PROGRAF) 0.5 MG Cap Take 2 capsules (1 mg total) by mouth every 12 (twelve) hours.    cloNIDine (CATAPRES) 0.1 MG tablet Take 1 tablet (0.1 mg total) by mouth 3 (three) times daily. (Patient not taking: Reported on 8/4/2022)    flash glucose scanning reader (FREESTYLE BRYAN 14 DAY READER) Misc 1 Device by Misc.(Non-Drug; Combo Route) route as needed.    flash glucose sensor (FREESTYLE BRYAN 14 DAY SENSOR) Kit 1 kit by Misc.(Non-Drug; Combo Route) route every 14 (fourteen) days.    ipratropium (ATROVENT) 0.02 % nebulizer solution Take 2.5 mLs (500 mcg total) by nebulization 4 (four) times daily. (Patient not taking: Reported on 8/4/2022)    isosorbide mononitrate (IMDUR) 30 MG 24 hr tablet Take 2 tablets (60 mg total) by mouth once daily. (Patient not taking: Reported on 8/4/2022)    ketoconazole (NIZORAL) 2 % cream Apply topically 2 (two) times daily.    linaCLOtide (LINZESS) 72 mcg Cap capsule Take 1 capsule (72 mcg total) by mouth before breakfast.    naloxone  (NARCAN) 4 mg/actuation Spry 1 spray by Nasal route once.    semaglutide (OZEMPIC) 1 mg/dose (2 mg/1.5 mL) PnIj Inject 1 mg under the skin every 7 days. (Patient taking differently: ())    sertraline (ZOLOFT) 25 MG tablet Take 1 tablet (25 mg total) by mouth once daily. For depression and anxiety (Patient not taking: Reported on 2022)    VIOS AEROSOL DELIVERY SYSTEM [place] USE Q 4 H PRN     Family History       Problem Relation (Age of Onset)    Cancer Father    Diabetes Father    Heart failure Father, Mother    Stroke Father          Tobacco Use    Smoking status: Former Smoker     Packs/day: 1.00     Years: 40.00     Pack years: 40.00     Quit date: 2013     Years since quittin.5    Smokeless tobacco: Never Used   Substance and Sexual Activity    Alcohol use: Yes     Alcohol/week: 6.0 standard drinks     Types: 6 Cans of beer per week     Comment: seldom    Drug use: No    Sexual activity: Never     Review of Systems   Constitutional:  Negative for fatigue and fever.   HENT:  Negative for sinus pressure.    Eyes:  Negative for visual disturbance.   Respiratory:  Negative for shortness of breath.    Cardiovascular:  Negative for chest pain.   Gastrointestinal:  Negative for vomiting.   Genitourinary:  Negative for difficulty urinating.   Musculoskeletal:  Positive for arthralgias, back pain and gait problem.   Skin:  Negative for rash.   Neurological:  Negative for headaches.   Psychiatric/Behavioral:  Negative for confusion.    Objective:     Vital Signs (Most Recent):  Temp: 97.8 °F (36.6 °C) (22 1650)  Pulse: 72 (22 1702)  Resp: 16 (22 1650)  BP: 135/71 (22 1702)  SpO2: 95 % (22 1650)   Vital Signs (24h Range):  Temp:  [97.6 °F (36.4 °C)-98.3 °F (36.8 °C)] 97.8 °F (36.6 °C)  Pulse:  [70-75] 72  Resp:  [15-20] 16  SpO2:  [92 %-96 %] 95 %  BP: (124-195)/(61-94) 135/71     Weight: 113.7 kg (250 lb 10.6 oz)  Body mass index is 34.96 kg/m².    Physical  Exam  Constitutional:       General: He is not in acute distress.     Appearance: He is well-developed. He is obese. He is not diaphoretic.   HENT:      Head: Normocephalic and atraumatic.   Eyes:      Pupils: Pupils are equal, round, and reactive to light.   Cardiovascular:      Rate and Rhythm: Normal rate and regular rhythm.      Heart sounds: Normal heart sounds. No murmur heard.    No friction rub. No gallop.   Pulmonary:      Effort: Pulmonary effort is normal. No respiratory distress.      Breath sounds: Normal breath sounds. No stridor. No wheezing or rales.   Abdominal:      General: Bowel sounds are normal. There is no distension.      Palpations: Abdomen is soft. There is no mass.      Tenderness: There is no abdominal tenderness. There is no guarding.   Musculoskeletal:      Comments: Right bka, left transmetatarsal amputation   Skin:     General: Skin is warm.      Findings: No erythema.   Neurological:      Mental Status: He is alert and oriented to person, place, and time.         CRANIAL NERVES     CN III, IV, VI   Pupils are equal, round, and reactive to light.     Significant Labs:   Results for orders placed or performed during the hospital encounter of 08/04/22   CBC auto differential   Result Value Ref Range    WBC 5.41 3.90 - 12.70 K/uL    RBC 4.28 (L) 4.60 - 6.20 M/uL    Hemoglobin 13.0 (L) 14.0 - 18.0 g/dL    Hematocrit 42.6 40.0 - 54.0 %     (H) 82 - 98 fL    MCH 30.4 27.0 - 31.0 pg    MCHC 30.5 (L) 32.0 - 36.0 g/dL    RDW 13.5 11.5 - 14.5 %    Platelets 201 150 - 450 K/uL    MPV 9.2 9.2 - 12.9 fL    Immature Granulocytes 0.4 0.0 - 0.5 %    Gran # (ANC) 3.7 1.8 - 7.7 K/uL    Immature Grans (Abs) 0.02 0.00 - 0.04 K/uL    Lymph # 1.0 1.0 - 4.8 K/uL    Mono # 0.6 0.3 - 1.0 K/uL    Eos # 0.0 0.0 - 0.5 K/uL    Baso # 0.02 0.00 - 0.20 K/uL    nRBC 0 0 /100 WBC    Gran % 69.1 38.0 - 73.0 %    Lymph % 18.1 18.0 - 48.0 %    Mono % 11.8 4.0 - 15.0 %    Eosinophil % 0.2 0.0 - 8.0 %    Basophil %  0.4 0.0 - 1.9 %    Differential Method Automated    Comprehensive metabolic panel   Result Value Ref Range    Sodium 136 136 - 145 mmol/L    Potassium 4.9 3.5 - 5.1 mmol/L    Chloride 102 95 - 110 mmol/L    CO2 24 23 - 29 mmol/L    Glucose 250 (H) 70 - 110 mg/dL    BUN 30 (H) 8 - 23 mg/dL    Creatinine 1.8 (H) 0.5 - 1.4 mg/dL    Calcium 8.4 (L) 8.7 - 10.5 mg/dL    Total Protein 6.7 6.0 - 8.4 g/dL    Albumin 3.0 (L) 3.5 - 5.2 g/dL    Total Bilirubin 0.5 0.1 - 1.0 mg/dL    Alkaline Phosphatase 156 (H) 55 - 135 U/L     (H) 10 - 40 U/L    ALT 73 (H) 10 - 44 U/L    Anion Gap 10 8 - 16 mmol/L    eGFR 40 (A) >60 mL/min/1.73 m^2   COVID-19 Rapid Screening   Result Value Ref Range    SARS-CoV-2 RNA, Amplification, Qual Negative Negative   Hemoglobin A1c if not done in past 3 months   Result Value Ref Range    Hemoglobin A1C 7.4 (H) 4.0 - 5.6 %    Estimated Avg Glucose 166 (H) 68 - 131 mg/dL   CBC Auto Differential   Result Value Ref Range    WBC 4.67 3.90 - 12.70 K/uL    RBC 3.76 (L) 4.60 - 6.20 M/uL    Hemoglobin 11.7 (L) 14.0 - 18.0 g/dL    Hematocrit 37.4 (L) 40.0 - 54.0 %     (H) 82 - 98 fL    MCH 31.1 (H) 27.0 - 31.0 pg    MCHC 31.3 (L) 32.0 - 36.0 g/dL    RDW 13.4 11.5 - 14.5 %    Platelets 155 150 - 450 K/uL    MPV 9.4 9.2 - 12.9 fL    Immature Granulocytes 0.2 0.0 - 0.5 %    Gran # (ANC) 3.0 1.8 - 7.7 K/uL    Immature Grans (Abs) 0.01 0.00 - 0.04 K/uL    Lymph # 1.0 1.0 - 4.8 K/uL    Mono # 0.7 0.3 - 1.0 K/uL    Eos # 0.0 0.0 - 0.5 K/uL    Baso # 0.01 0.00 - 0.20 K/uL    nRBC 0 0 /100 WBC    Gran % 64.5 38.0 - 73.0 %    Lymph % 21.0 18.0 - 48.0 %    Mono % 13.9 4.0 - 15.0 %    Eosinophil % 0.2 0.0 - 8.0 %    Basophil % 0.2 0.0 - 1.9 %    Differential Method Automated    Basic Metabolic Panel   Result Value Ref Range    Sodium 135 (L) 136 - 145 mmol/L    Potassium 4.2 3.5 - 5.1 mmol/L    Chloride 105 95 - 110 mmol/L    CO2 21 (L) 23 - 29 mmol/L    Glucose 159 (H) 70 - 110 mg/dL    BUN 26 (H) 8 - 23 mg/dL     Creatinine 1.5 (H) 0.5 - 1.4 mg/dL    Calcium 7.7 (L) 8.7 - 10.5 mg/dL    Anion Gap 9 8 - 16 mmol/L    eGFR 50 (A) >60 mL/min/1.73 m^2   Magnesium   Result Value Ref Range    Magnesium 1.8 1.6 - 2.6 mg/dL   Tacrolimus level   Result Value Ref Range    Tacrolimus Lvl 2.0 (L) 5.0 - 15.0 ng/mL   Comprehensive Metabolic Panel   Result Value Ref Range    Sodium 135 (L) 136 - 145 mmol/L    Potassium 4.2 3.5 - 5.1 mmol/L    Chloride 105 95 - 110 mmol/L    CO2 23 23 - 29 mmol/L    Glucose 163 (H) 70 - 110 mg/dL    BUN 15 8 - 23 mg/dL    Creatinine 1.1 0.5 - 1.4 mg/dL    Calcium 7.8 (L) 8.7 - 10.5 mg/dL    Total Protein 5.3 (L) 6.0 - 8.4 g/dL    Albumin 2.3 (L) 3.5 - 5.2 g/dL    Total Bilirubin 0.7 0.1 - 1.0 mg/dL    Alkaline Phosphatase 120 55 - 135 U/L    AST 35 10 - 40 U/L    ALT 19 10 - 44 U/L    Anion Gap 7 (L) 8 - 16 mmol/L    eGFR >60 >60 mL/min/1.73 m^2   Echo Saline Bubble? No   Result Value Ref Range    BSA 2.27 m2    TDI SEPTAL 0.09 m/s    LV LATERAL E/E' RATIO 8.45 m/s    LV SEPTAL E/E' RATIO 10.33 m/s    LA WIDTH 3.60 cm    IVC diameter 1.30 cm    Left Ventricular Outflow Tract Mean Velocity 0.51718492551 cm/s    Left Ventricular Outflow Tract Mean Gradient 2.13 mmHg    TDI LATERAL 0.11 m/s    LVIDd 3.72 3.5 - 6.0 cm    IVS 1.59 (A) 0.6 - 1.1 cm    Posterior Wall 1.78 (A) 0.6 - 1.1 cm    Ao root annulus 3.36 cm    LVIDs 2.45 2.1 - 4.0 cm    FS 34 28 - 44 %    LA volume 57.29 cm3    Sinus 3.49 cm    STJ 3.49 cm    Ascending aorta 3.28 cm    LV mass 254.30 g    LA size 3.62 cm    Left Ventricle Relative Wall Thickness 0.96 cm    AV mean gradient 3 mmHg    AV valve area 2.80 cm2    AV Velocity Ratio 0.75     AV index (prosthetic) 0.75     MV valve area p 1/2 method 3.53 cm2    E/A ratio 0.81     Mean e' 0.10 m/s    E wave deceleration time 215.653469914283517 msec    IVRT 82.890636464 msec    LVOT diameter 2.18 cm    LVOT area 3.7 cm2    LVOT peak jordy 0.84 m/s    LVOT peak VTI 18.60 cm    Ao peak jordy 1.12 m/s     Ao VTI 24.8 cm    RVOT peak honorio 0.91 m/s    RVOT peak VTI 18.1 cm    LVOT stroke volume 69.39 cm3    AV peak gradient 5 mmHg    PV mean gradient 2.63 mmHg    E/E' ratio 9.30 m/s    MV Peak E Honorio 0.93 m/s    TR Max Honorio 2.53 m/s    MV stenosis pressure 1/2 time [id number] ms    MV Peak A Honorio 1.15 m/s    LV Systolic Volume 21.24 mL    LV Systolic Volume Index 9.6 mL/m2    LV Diastolic Volume 58.87 mL    LV Diastolic Volume Index 26.52 mL/m2    LA Volume Index 25.8 mL/m2    LV Mass Index 115 g/m2    RA Major Axis 4.42 cm    Left Atrium Minor Axis 4.79 cm    Left Atrium Major Axis 5.62 cm    Triscuspid Valve Regurgitation Peak Gradient 26 mmHg    RA Width 2.57 cm    Right Atrial Pressure (from IVC) 3 mmHg    EF 60 %    TV rest pulmonary artery pressure 29 mmHg   POCT glucose   Result Value Ref Range    POCT Glucose 263 (H) 70 - 110 mg/dL   POCT glucose   Result Value Ref Range    POCT Glucose 264 (H) 70 - 110 mg/dL   POCT glucose   Result Value Ref Range    POCT Glucose 192 (H) 70 - 110 mg/dL   POCT glucose   Result Value Ref Range    POCT Glucose 210 (H) 70 - 110 mg/dL   POCT glucose   Result Value Ref Range    POCT Glucose 157 (H) 70 - 110 mg/dL   POCT glucose   Result Value Ref Range    POCT Glucose 178 (H) 70 - 110 mg/dL   POCT glucose   Result Value Ref Range    POCT Glucose 193 (H) 70 - 110 mg/dL   POCT glucose   Result Value Ref Range    POCT Glucose 189 (H) 70 - 110 mg/dL   POCT glucose   Result Value Ref Range    POCT Glucose 183 (H) 70 - 110 mg/dL   POCT glucose   Result Value Ref Range    POCT Glucose 162 (H) 70 - 110 mg/dL   POCT glucose   Result Value Ref Range    POCT Glucose 160 (H) 70 - 110 mg/dL     *Note: Due to a large number of results and/or encounters for the requested time period, some results have not been displayed. A complete set of results can be found in Results Review.        Significant Imaging: X-Ray Ankle Complete Left  Narrative: EXAMINATION:  XR ANKLE COMPLETE 3 VIEW  LEFT    CLINICAL HISTORY:  intraop;    COMPARISON:  None  Impression: FINDINGS/  Intraoperative fluoroscopic images were obtained.    Total fluoro time was 1.4 minute and 2 fluoroscopic images were obtained.  See operative report.    Electronically signed by: Ryan Joya MD  Date:    08/04/2022  Time:    15:55  SURG [place] Surgery Fluoro Usage  See OP Notes for results.     IMPRESSION: See OP Notes for results.     This procedure was auto-finalized by: Virtual [employer]i[facility]  Echo Saline Bubble? No  · The left ventricle is normal in size with moderate concentric   hypertrophy and normal systolic function.  · The estimated ejection fraction is 60%.  · Normal left ventricular diastolic function.  · Normal right ventricular size with normal right ventricular systolic   function.  · Mild tricuspid regurgitation.  · Normal central venous pressure (3 mmHg).  · The estimated PA systolic pressure is 29 mmHg.     X-Ray Chest AP Portable  Narrative: EXAMINATION:  XR CHEST AP PORTABLE    CLINICAL HISTORY:  Encounter for other preprocedural examination    FINDINGS:  Single view of the chest.  08/15/2021 comparison    Cardiac silhouette is normal.  Aorta demonstrates atherosclerotic disease. The lungs demonstrate no evidence of active disease.  Mild bibasilar atelectasis.  No evidence of pleural effusion or pneumothorax.  Bones appear intact demonstrate scattered degenerative change.  Impression: No acute process seen.    Electronically signed by: Ryan Joya MD  Date:    08/04/2022  Time:    07:14  X-Ray Tibia Fibula 2 View Left  Narrative: EXAMINATION:  XR TIBIA FIBULA 2 VIEW LEFT; XR ANKLE COMPLETE 3 VIEW LEFT    CLINICAL HISTORY:  XR TIBIA FIBULA 2 VIEW LEFT; XR ANKLE COMPLETE 3 VIEW LEFTUnspecified fall, initial encounter    COMPARISON:  None    FINDINGS:  Four views of the left tibia/fibula and three views of the left ankle were obtained.    Comminuted tibial metaphyseal fracture with angulation and mild displacement.  There  is extension to the medial articular surface and involvement of the interosseous membrane between the tibia and fibula.  Transverse fracture of the distal fibula above the lateral malleolus with mild displacement and angulation.  Mildly comminuted proximal fibular fracture.  Moderate joint effusion of the ankle.  Small avulsion injury at the tip of the medial malleolus    Diffuse osteopenia and moderate soft tissue swelling.  Dense vascular calcifications.  Moderate degenerative changes of the ankle and knee joints.  Advanced degenerative changes within the midfoot.  Large plantar calcaneal spur.  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  Impression: See above.    Electronically signed by: Ryan Joya MD  Date:    08/04/2022  Time:    06:59  X-Ray Ankle Complete Left  Narrative: EXAMINATION:  XR TIBIA FIBULA 2 VIEW LEFT; XR ANKLE COMPLETE 3 VIEW LEFT    CLINICAL HISTORY:  XR TIBIA FIBULA 2 VIEW LEFT; XR ANKLE COMPLETE 3 VIEW LEFTUnspecified fall, initial encounter    COMPARISON:  None    FINDINGS:  Four views of the left tibia/fibula and three views of the left ankle were obtained.    Comminuted tibial metaphyseal fracture with angulation and mild displacement.  There is extension to the medial articular surface and involvement of the interosseous membrane between the tibia and fibula.  Transverse fracture of the distal fibula above the lateral malleolus with mild displacement and angulation.  Mildly comminuted proximal fibular fracture.  Moderate joint effusion of the ankle.  Small avulsion injury at the tip of the medial malleolus    Diffuse osteopenia and moderate soft tissue swelling.  Dense vascular calcifications.  Moderate degenerative changes of the ankle and knee joints.  Advanced degenerative changes within the midfoot.  Large plantar calcaneal spur.  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  Impression: See  above.    Electronically signed by: Ryan Joya MD  Date:    08/04/2022  Time:    06:59

## 2022-08-06 NOTE — PROCEDURES
"Lavelle Ladd is a 67 y.o. male patient.    Temp: 97.9 °F (36.6 °C) (08/06/22 0749)  Pulse: 71 (08/06/22 0749)  Resp: 15 (08/06/22 0922)  BP: (!) 164/70 (08/06/22 0749)  SpO2: 96 % (08/06/22 0749)  Weight: 113.7 kg (250 lb 10.6 oz) (08/06/22 0119)  Height: 5' 11" (180.3 cm) (08/04/22 0630)       Procedures     Sharp debridement of skin    Consent was obtained.  Risks benefits and complications were reviewed.    The area of the left hip and buttock was prepped and draped in standard fashion.  A time-out was performed.    10 cc 1% lidocaine without epinephrine was infiltrated.    A scalpel blade was then used to sharply debride the epidermis down to the layers of the subcutaneous tissue.    Hemostasis was achieved with direct pressure.    The total area debrided was 2 x 6 cm with a additional 0.5 cm x 2 areas debrided inferiorly and medially to the larger area.    Dry sterile dressings were placed.  Patient tolerated a seizure well    Postop wound care orders were provided    8/6/2022  "

## 2022-08-07 NOTE — CONSULTS
[facility]  Nephrology  Consult Note    Patient Name: Lavelle Ladd  MRN: [id number]  Admission Date: 2022  Hospital Length of Stay: 3 days  Attending Provider: Chapin Proctor MD   Primary Care Physician: Yahir Calzada MD  Principal Problem:Closed fracture of left tibia and fibula    Inpatient consult to Nephrology  Consult performed by: Jose David Hooker MD  Consult ordered by: Sherita Christensen NP  Reason for consult: Kidney transplant        Subjective:     HPI:  67-year-old male who is admitted after falling and sustaining a lower extremity fracture.  Now status post external fixation.  Has history of kidney transplant.  Nephrology has been consulted for assistance with management of immunosuppression and kidney transplant.  The patient was seen in his hospital room.  In bed resting comfortably.  No acute distress noted.  He underwent kidney transplant in May of 2016. He states that he has not seen a general nephrologist or a transplant nephrologist in almost 2 years.  He states that no one has been following his Prograf levels.  He does not remember his home dose of Prograf.    Past Medical History:   Diagnosis Date    DINORAH (acute kidney injury) 2016    Arthritis     CAD in native artery 2019    CHF (congestive heart failure)     Chronic obstructive pulmonary disease 2016    Coronary artery disease involving native coronary artery of native heart without angina pectoris 2016    CRI (chronic renal insufficiency) 2019     donor kidney transplant for DM 16     Induction with Thymo x3 and IV solumedrol to total 875mg  Kidney Biopsy  2016: 16 glomeruli, ACR type 1 AVR type 2, significant microcirculatory changes, c4d negative, No DSA, 5 to10% fibrosis. Treated with thymo x8 2016- no rejection      Diastolic heart failure     Encounter for blood transfusion     ESRD on RRT since 10/2013 10/29/2013    Biopsy proven diabetic nephropathy and  lymphoplasmacytic interstitial infiltrate not c/w with AIN (ddx sjogrens or assoc with tamm-horsefall protein extravasation)     GERD (gastroesophageal reflux disease)     History of hepatitis C, s/p successful Rx w/ SVR12 - 4/2017 4/5/2017    Completed 12 weeks harvoni w/ SVR    Hyperlipidemia     Hypertension     PAD (peripheral artery disease) 7/21/2019    PIC line (peripherally inserted central catheter) flush     Prophylactic immunotherapy     Proteinuria     PVD (peripheral vascular disease) 6/26/2017    RLE BKA CT 12/11/16 Extensive atherosclerotic disease of the aorta and mesenteric arteries.     Renal hypertension     Type 2 diabetes mellitus with diabetic neuropathy, with long-term current use of insulin 12/1/2016    Vitamin B12 deficiency        Past Surgical History:   Procedure Laterality Date    AORTOGRAPHY WITH SERIALOGRAPHY N/A 6/14/2018    Procedure: LEFT LEG ANGIOGRAM;  Surgeon: Donal Mcdonald MD;  Location: [facility] CATH LAB;  Service: Vascular;  Laterality: N/A;    APPLICATION OF LARGE EXTERNAL FIXATION DEVICE TO TIBIA Left 8/4/2022    Procedure: APPLICATION, EXTERNAL FIXATION DEVICE, LARGE, TIBIA;  Surgeon: Good Villa MD;  Location: [facility] OR;  Service: Orthopedics;  Laterality: Left;    av bovine graft      Left UE    AV FISTULA PLACEMENT      left UE    CARDIAC CATHETERIZATION  02/2015    CLOSED REDUCTION OF INJURY OF TIBIA Left 8/4/2022    Procedure: CLOSED REDUCTION, TIBIA;  Surgeon: Good Villa MD;  Location: [facility] OR;  Service: Orthopedics;  Laterality: Left;  Closed reduction left tibial and fibular shaft fractures    CLOSURE OF WOUND Left 9/24/2018    Procedure: CLOSURE, WOUND;  Surgeon: Karla Wheeler DPM;  Location: [facility] OR;  Service: Podiatry;  Laterality: Left;  Secondary Wound closure, extensive    CLOSURE OF WOUND Left 11/5/2018    Procedure: CLOSURE, WOUND;  Surgeon: Karla Wheeler DPM;  Location: [facility] OR;  Service: Podiatry;  Laterality: Left;     DEBRIDEMENT OF MULTIPLE METATARSAL BONES Left 11/5/2018    Procedure: DEBRIDEMENT, METATARSAL BONE, 2 OR MORE BONES;  Surgeon: Karla Wheeler DPM;  Location: [facility] OR;  Service: Podiatry;  Laterality: Left;    EXCISION OF SKIN Left 9/27/2019    Procedure: EXCISION, SKIN;  Surgeon: Lenard Alarcon MD;  Location: [facility] OR 2ND FLR;  Service: Plastics;  Laterality: Left;  Plastics set, NIMS monitor, [employer]    FOOT AMPUTATION THROUGH METATARSAL Left 9/21/2018    Procedure: AMPUTATION, FOOT, TRANSMETATARSAL;  Surgeon: Karla Wheeler DPM;  Location: [facility] OR;  Service: Podiatry;  Laterality: Left;    FOOT AMPUTATION THROUGH METATARSAL Left 10/31/2018    Procedure: AMPUTATION, FOOT, TRANSMETATARSAL;  Surgeon: Karla Wheeler DPM;  Location: [facility] OR;  Service: Podiatry;  Laterality: Left;    FOOT AMPUTATION THROUGH METATARSAL Left 11/5/2018    Procedure: AMPUTATION, FOOT, TRANSMETATARSAL;  Surgeon: Karla Wheeler DPM;  Location: [facility] OR;  Service: Podiatry;  Laterality: Left;  revisional transmetatarsal amputation, Left foot    IRRIGATION AND DEBRIDEMENT OF LOWER EXTREMITY Left 10/31/2018    Procedure: IRRIGATION AND DEBRIDEMENT, LOWER EXTREMITY;  Surgeon: Karla Wheeler DPM;  Location: [facility] OR;  Service: Podiatry;  Laterality: Left;    KIDNEY TRANSPLANT  05/21/2016    LEFT HEART CATHETERIZATION Left 7/21/2019    Procedure: CATHETERIZATION, HEART, LEFT;  Surgeon: Andrew Valdez MD;  Location: [facility] CATH LAB;  Service: Cardiology;  Laterality: Left;    LEG AMPUTATION THROUGH KNEE  2011    right LE, started as nail puncture leading to diabetic ulcer    SKIN FULL THICKNESS GRAFT Left 10/7/2019    Procedure: APPLICATION, GRAFT, SKIN, FULL-THICKNESS;  Surgeon: Lenard Alarcon MD;  Location: [facility] OR 2ND FLR;  Service: Plastics;  Laterality: Left;    SURGICAL REMOVAL OF LESION OF FACE Right 10/7/2019    Procedure: EXCISION, LESION, FACE;  Surgeon: Lenard Alarcon MD;  Location: [facility] OR 2ND FLR;   Service: Plastics;  Laterality: Right;       Review of patient's allergies indicates:  No Known Allergies  Current Facility-Administered Medications   Medication Frequency    acetaminophen tablet 650 mg Q4H PRN    albuterol-ipratropium 2.5 mg-0.5 mg/3 mL nebulizer solution 3 mL Q4H PRN    aluminum-magnesium hydroxide-simethicone 200-200-20 mg/5 mL suspension 30 mL Q6H PRN    amLODIPine tablet 10 mg Daily    atorvastatin tablet 80 mg Daily    benzonatate capsule 100 mg TID PRN    chlorhexidine 0.12 % solution 10 mL BID    collagenase ointment Daily    dextrose 10% bolus 125 mL PRN    dextrose 10% bolus 250 mL PRN    fluticasone propionate 50 mcg/actuation nasal spray 100 mcg Daily    glucagon (human recombinant) injection 1 mg PRN    guaiFENesin 100 mg/5 ml syrup 200 mg Q4H PRN    hydrALAZINE injection 10 mg Q8H PRN    insulin aspart U-100 pen 1-10 Units Q6H PRN    melatonin tablet 6 mg Nightly PRN    metoprolol tartrate (LOPRESSOR) tablet 25 mg BID    morphine injection 2 mg Q2H PRN    mupirocin 2 % ointment Daily    ondansetron disintegrating tablet 8 mg Q8H PRN    oxyCODONE-acetaminophen  mg per tablet 1 tablet Q4H PRN    scopolamine 1.3-1.5 mg (1 mg over 3 days) 1 patch Q3 Days    sertraline tablet 25 mg Daily    sodium chloride 0.9% flush 10 mL PRN    sodium chloride 0.9% flush 10 mL PRN    sodium chloride 0.9% flush 10 mL PRN    sodium hypochlorite 0.125% external solution BID    tacrolimus capsule 1 mg BID     Family History     Problem Relation (Age of Onset)    Cancer Father    Diabetes Father    Heart failure Father, Mother    Stroke Father        Tobacco Use    Smoking status: Former Smoker     Packs/day: 1.00     Years: 40.00     Pack years: 40.00     Quit date: 2013     Years since quittin.5    Smokeless tobacco: Never Used   Substance and Sexual Activity    Alcohol use: Yes     Alcohol/week: 6.0 standard drinks     Types: 6 Cans of beer per week     Comment:  seldom    Drug use: No    Sexual activity: Never     Review of Systems   Constitutional: Negative.    HENT: Negative.    Eyes: Negative.    Respiratory: Negative.    Cardiovascular: Negative.    Gastrointestinal: Negative.    Genitourinary: Negative.    Musculoskeletal:        Discomfort in his left lower extremity has surgical site.   Skin: Negative.    Neurological: Negative.    Hematological: Negative.      Objective:     Vital Signs (Most Recent):  Temp: 98 °F (36.7 °C) (08/07/22 0701)  Pulse: 73 (08/07/22 0701)  Resp: 19 (08/07/22 0938)  BP: (!) 170/79 (08/07/22 0701)  SpO2: 97 % (08/07/22 0701)  O2 Device (Oxygen Therapy): room air (08/07/22 0701) Vital Signs (24h Range):  Temp:  [97.6 °F (36.4 °C)-98 °F (36.7 °C)] 98 °F (36.7 °C)  Pulse:  [70-74] 73  Resp:  [15-19] 19  SpO2:  [93 %-97 %] 97 %  BP: (124-195)/(61-81) 170/79     Weight: 114.3 kg (251 lb 15.8 oz) (08/06/22 2330)  Body mass index is 35.14 kg/m².  Body surface area is 2.39 meters squared.    I/O last 3 completed shifts:  In: 3109.3 [P.O.:540; I.V.:2569.3]  Out: 575 [Urine:575]    Physical Exam  Constitutional:       Appearance: Normal appearance.   HENT:      Head: Normocephalic and atraumatic.   Eyes:      General: No scleral icterus.     Extraocular Movements: Extraocular movements intact.      Pupils: Pupils are equal, round, and reactive to light.   Pulmonary:      Effort: Pulmonary effort is normal.      Breath sounds: No stridor.   Musculoskeletal:      Comments: External fixation device left lower extremity.   Skin:     General: Skin is warm.   Neurological:      General: No focal deficit present.      Mental Status: He is alert and oriented to person, place, and time.   Psychiatric:         Mood and Affect: Mood normal.         Behavior: Behavior normal.         Significant Labs:  BMP:   Recent Labs   Lab 08/07/22  0515   *   *   K 4.2      CO2 23   BUN 16   CREATININE 1.2   CALCIUM 7.9*   MG 2.0     CMP:   Recent Labs    Lab 08/06/22  1010 08/07/22  0515   * 146*   CALCIUM 7.8* 7.9*   ALBUMIN 2.3* 2.3*   PROT 5.3*  --    * 135*   K 4.2 4.2   CO2 23 23    104   BUN 15 16   CREATININE 1.1 1.2   ALKPHOS 120  --    ALT 19  --    AST 35  --    BILITOT 0.7  --      All labs within the past 24 hours have been reviewed.    Significant Imaging:  Labs: Reviewed  Reviewed    Assessment/Plan:     Active Diagnoses:    Diagnosis Date Noted POA    PRINCIPAL PROBLEM:  Closed fracture of left tibia and fibula [S82.202A, S82.402A] 08/04/2022 Yes    Pressure injury of left hip, stage 2 [L89.222] 08/05/2022 Yes    Severe central sleep apnea comorbid with prescribed opioid use [F11.90, G47.37] 01/29/2020 Yes    Kidney transplant recipient [Z94.0] 04/07/2017 Not Applicable    Type 2 diabetes mellitus with stable proliferative retinopathy of both eyes, with long-term current use of insulin [E11.3553, Z79.4] 12/01/2016 Not Applicable    Coronary artery disease of native artery of native heart with stable angina pectoris [I25.118] 07/01/2016 Yes    Osteoarthritis of lumbar spine [M47.816]  Yes    Essential hypertension [I10] 02/20/2015 Yes      Problems Resolved During this Admission:       1. Kidney transplant status:  Status post kidney transplant in May of 2016.  Cause of renal failure was diabetic nephropathy.  Creatinine is stable at 1.2.    2. Immunosuppression:  He does not remember his home dose of Prograf.  He has not seen Nephrology in several years.  On review of old notes he should have been taking 1.5 mg twice a day.  His admit notes here show that he was only taking 0.5 mg twice a day.  His Prograf level was 2.0.  Target is 3-6 given his transplant vintage.    Will plan to increase Prograf to 1.5 in the morning and 0.5 in the afternoon.  Will plan to recheck a level tomorrow.    Attempted to order Prograf level at 6:00 a.m. this morning.  Unfortunately it was drawn yesterday 3:48 p.m..  The level is not  interpretable as is not a true trough.    Mycophenolate on hold secondary to lower extremity fracture.    Magnesium is stable 2.0.        Thank you for your consult.     Jose David Hooker MD  Nephrology  [facility]

## 2022-08-07 NOTE — ASSESSMENT & PLAN NOTE
Santyl applied with dressing change.    Follow up with surgery in 4 weeks.  For other wound care per Wound Care Service    Further management and wound care.  Follow up with General surgery

## 2022-08-07 NOTE — PLAN OF CARE
[facility] - Med Surg  Initial Discharge Assessment       Primary Care Provider: Yahir Calzada MD    Admission Diagnosis: Coronary artery disease [I25.10]  Preop examination [Z01.818]  Fall [W19.XXXA]  Closed fracture of left tibia and fibula, initial encounter [S82.202A, S82.402A]  Fracture tibia/fibula, left, closed, initial encounter [S82.202A, S82.402A]    Admission Date: 8/4/2022  Expected Discharge Date:     Discharge Barriers Identified: None    Payor: HU[employer] MANAGED MEDICARE / Plan: HU[employer]NA TOTAL CARE ADVANTAGE / Product Type: Medicare Advantage /     Extended Emergency Contact Information  Primary Emergency Contact: Kecia Sagastume   [place]  Mobile Phone: [phone]  Relation: Sister    Discharge Plan A: Skilled Nursing Facility  Discharge Plan B: Skilled Nursing Facility      [id number] - [place], [place] - [address]  [address]  [place] [address]  Phone: [phone] Fax: [phone]    [id number] - [place], [place] - [address]  [address]  [place] [address]  Phone: [phone] Fax: [phone]    [facility] (New Address) - [address] AT Previously: [address]  [address]th Floor [address] [place] [address]  Phone: [phone] Fax: [phone]    [facility] (Now [facility]) - [facility] [address]  [address] [facility] [address]  Phone: [phone] Fax: [phone]      Initial Assessment (most recent)     Adult Discharge Assessment - 08/07/22 0905        Discharge Assessment    Assessment Type Discharge Planning Assessment     Confirmed/corrected address, phone number and insurance Yes     Confirmed Demographics Correct on Facesheet     Source of Information patient     Reason For Admission Fall     Lives With alone     Do you expect to return to your current living  situation? Yes     Do you have help at home or someone to help you manage your care at home? No     Prior to hospitilization cognitive status: Alert/Oriented     Current cognitive status: Alert/Oriented     Walking or Climbing Stairs Difficulty none     Dressing/Bathing Difficulty none     Home Layout Able to live on 1st floor     Equipment Currently Used at Home none     Do you take prescription medications? Yes     Do you have prescription coverage? Yes     Coverage [employer]     Do you have any problems affording any of your prescribed medications? No     Is the patient taking medications as prescribed? yes     Are you on dialysis? No     Do you take coumadin? No     Discharge Plan A Skilled Nursing Facility     Discharge Plan B Skilled Nursing Facility     DME Needed Upon Discharge  none     Discharge Plan discussed with: Patient     Discharge Barriers Identified None               Swer met patient at the bedside to complete discharge planning and discuss SNF. Pt selected [facility] for placement, sent through [employer]. CM provided a transitional care folder, information on advanced directives, information on pharmacy bedside delivery, and discharge planning begins on admission with contact information for any needs/questions.

## 2022-08-07 NOTE — PROGRESS NOTES
[facility]  General Surgery  Progress Note    Subjective:     History of Present Illness:  No notes on file    Post-Op Info:  Procedure(s) (LRB):  APPLICATION, EXTERNAL FIXATION DEVICE, LARGE, TIBIA (Left)  CLOSED REDUCTION, TIBIA (Left)   3 Days Post-Op     Interval History:  Debridement site is clean.  Enzymatically debride her applied    Medications:  Continuous Infusions:  Scheduled Meds:   amLODIPine  10 mg Oral Daily    apixaban  2.5 mg Oral BID    atorvastatin  80 mg Oral Daily    chlorhexidine  10 mL Mouth/Throat BID    collagenase   Topical (Top) Daily    fluticasone propionate  2 spray Each Nostril Daily    metoprolol tartrate  25 mg Oral BID    mupirocin   Topical (Top) Daily    scopolamine  1 patch Transdermal Q3 Days    sertraline  25 mg Oral Daily    sodium hypochlorite 0.125%   Topical (Top) BID    tacrolimus  0.5 mg Oral Once    tacrolimus  1 mg Oral Daily PM    [START ON 8/8/2022] tacrolimus  1.5 mg Oral Daily AM     PRN Meds:acetaminophen, albuterol-ipratropium, aluminum-magnesium hydroxide-simethicone, benzonatate, dextrose 10%, dextrose 10%, glucagon (human recombinant), guaiFENesin 100 mg/5 ml, hydrALAZINE, insulin aspart U-100, melatonin, morphine, ondansetron, oxyCODONE-acetaminophen, sodium chloride 0.9%, sodium chloride 0.9%, sodium chloride 0.9%     Review of patient's allergies indicates:  No Known Allergies  Objective:     Vital Signs (Most Recent):  Temp: 98 °F (36.7 °C) (08/07/22 0701)  Pulse: 73 (08/07/22 0701)  Resp: 19 (08/07/22 0938)  BP: (!) 170/79 (08/07/22 0701)  SpO2: 97 % (08/07/22 0701)   Vital Signs (24h Range):  Temp:  [97.6 °F (36.4 °C)-98 °F (36.7 °C)] 98 °F (36.7 °C)  Pulse:  [70-74] 73  Resp:  [15-19] 19  SpO2:  [93 %-97 %] 97 %  BP: (124-195)/(61-81) 170/79     Weight: 114.3 kg (251 lb 15.8 oz)  Body mass index is 35.14 kg/m².    Intake/Output - Last 3 Shifts         08/05 0700 08/06 0659 08/06 0700 08/07 0659 08/07 0700 08/08 0659    P.O. 420  540     I.V. (mL/kg) 2497.9 (22) 71.4 (0.6)     Total Intake(mL/kg) 2917.9 (25.7) 611.4 (5.3)     Urine (mL/kg/hr) 800 (0.3) 575 (0.2)     Stool 0      Total Output 800 575     Net +2117.9 +36.4            Stool Occurrence 1 x              Physical Exam  Constitutional:       Appearance: He is ill-appearing (Chronically).   Skin:     Comments: Left hip/buttock wound is clean.  Santyl applied.   Neurological:      Mental Status: He is alert.       Significant Labs:  I have reviewed all pertinent lab results within the past 24 hours.  CBC:   Recent Labs   Lab 08/05/22  0610   WBC 4.67   RBC 3.76*   HGB 11.7*   HCT 37.4*      *   MCH 31.1*   MCHC 31.3*       Significant Diagnostics:  I have reviewed all pertinent imaging results/findings within the past 24 hours.  No new    Assessment/Plan:     Pressure injury of left hip, stage 2  Santyl applied with dressing change.    Follow up with surgery in 4 weeks.  For other wound care per Wound Care Service    Further management and wound care.  Follow up with General surgery      Please recall General surgery as needed    Ha Juarez MD  General Surgery  [facility] - Med [facility]

## 2022-08-07 NOTE — SUBJECTIVE & OBJECTIVE
Interval History:  Debridement site is clean.  Enzymatically debride her applied    Medications:  Continuous Infusions:  Scheduled Meds:   amLODIPine  10 mg Oral Daily    apixaban  2.5 mg Oral BID    atorvastatin  80 mg Oral Daily    chlorhexidine  10 mL Mouth/Throat BID    collagenase   Topical (Top) Daily    fluticasone propionate  2 spray Each Nostril Daily    metoprolol tartrate  25 mg Oral BID    mupirocin   Topical (Top) Daily    scopolamine  1 patch Transdermal Q3 Days    sertraline  25 mg Oral Daily    sodium hypochlorite 0.125%   Topical (Top) BID    tacrolimus  0.5 mg Oral Once    tacrolimus  1 mg Oral Daily PM    [START ON 8/8/2022] tacrolimus  1.5 mg Oral Daily AM     PRN Meds:acetaminophen, albuterol-ipratropium, aluminum-magnesium hydroxide-simethicone, benzonatate, dextrose 10%, dextrose 10%, glucagon (human recombinant), guaiFENesin 100 mg/5 ml, hydrALAZINE, insulin aspart U-100, melatonin, morphine, ondansetron, oxyCODONE-acetaminophen, sodium chloride 0.9%, sodium chloride 0.9%, sodium chloride 0.9%     Review of patient's allergies indicates:  No Known Allergies  Objective:     Vital Signs (Most Recent):  Temp: 98 °F (36.7 °C) (08/07/22 0701)  Pulse: 73 (08/07/22 0701)  Resp: 19 (08/07/22 0938)  BP: (!) 170/79 (08/07/22 0701)  SpO2: 97 % (08/07/22 0701)   Vital Signs (24h Range):  Temp:  [97.6 °F (36.4 °C)-98 °F (36.7 °C)] 98 °F (36.7 °C)  Pulse:  [70-74] 73  Resp:  [15-19] 19  SpO2:  [93 %-97 %] 97 %  BP: (124-195)/(61-81) 170/79     Weight: 114.3 kg (251 lb 15.8 oz)  Body mass index is 35.14 kg/m².    Intake/Output - Last 3 Shifts         08/05 0700 08/06 0659 08/06 0700 08/07 0659 08/07 0700 08/08 0659    P.O. 420 540     I.V. (mL/kg) 2497.9 (22) 71.4 (0.6)     Total Intake(mL/kg) 2917.9 (25.7) 611.4 (5.3)     Urine (mL/kg/hr) 800 (0.3) 575 (0.2)     Stool 0      Total Output 800 575     Net +2117.9 +36.4            Stool Occurrence 1 x              Physical Exam  Constitutional:        Appearance: He is ill-appearing (Chronically).   Skin:     Comments: Left hip/buttock wound is clean.  Santyl applied.   Neurological:      Mental Status: He is alert.       Significant Labs:  I have reviewed all pertinent lab results within the past 24 hours.  CBC:   Recent Labs   Lab 08/05/22  0610   WBC 4.67   RBC 3.76*   HGB 11.7*   HCT 37.4*      *   MCH 31.1*   MCHC 31.3*       Significant Diagnostics:  I have reviewed all pertinent imaging results/findings within the past 24 hours.  No new

## 2022-08-07 NOTE — PROGRESS NOTES
Lavelle Ladd is a 67 y.o. male patient.     Subjective   The patient is 3 days postop external fixation of left tibial and fibular fractures.  Also had debridement of left hip pressure injury yesterday by Dr. Juarez.    Complains of pain in the left lower leg not well controlled with oxycodone and morphine.  Complains of pain at the left hip surgical site also not well controlled.  Says he is not having chest pain and is breathing better.  Says he is uncomfortable in the bed.    1. Closed fracture of left tibia and fibula, initial encounter    2. Fall    3. Fracture tibia/fibula, left, closed, initial encounter    4. Preop examination    5. Coronary artery disease    6. Closed fracture of left tibia and fibula with routine healing, subsequent encounter    7. Pressure injury of left hip, stage 2      Past Medical History:   Diagnosis Date    DINORAH (acute kidney injury) 2016    Arthritis     CAD in native artery 2019    CHF (congestive heart failure)     Chronic obstructive pulmonary disease 2016    Coronary artery disease involving native coronary artery of native heart without angina pectoris 2016    CRI (chronic renal insufficiency) 2019     donor kidney transplant for DM 16     Induction with Thymo x3 and IV solumedrol to total 875mg  Kidney Biopsy  2016: 16 glomeruli, ACR type 1 AVR type 2, significant microcirculatory changes, c4d negative, No DSA, 5 to10% fibrosis. Treated with thymo x8 2016- no rejection      Diastolic heart failure     Encounter for blood transfusion     ESRD on RRT since 10/2013 10/29/2013    Biopsy proven diabetic nephropathy and lymphoplasmacytic interstitial infiltrate not c/w with AIN (ddx sjogrens or assoc with tamm-horsefall protein extravasation)     GERD (gastroesophageal reflux disease)     History of hepatitis C, s/p successful Rx w/ SVR12 - 2017    Completed 12 weeks harvoni w/ SVR    Hyperlipidemia      Hypertension     PAD (peripheral artery disease) 7/21/2019    PIC line (peripherally inserted central catheter) flush     Prophylactic immunotherapy     Proteinuria     PVD (peripheral vascular disease) 6/26/2017    RLE BKA CT 12/11/16 Extensive atherosclerotic disease of the aorta and mesenteric arteries.     Renal hypertension     Type 2 diabetes mellitus with diabetic neuropathy, with long-term current use of insulin 12/1/2016    Vitamin B12 deficiency      Past Surgical History Pertinent Negatives:   Procedure Date Noted    CARDIAC SURGERY 12/08/2015     Scheduled Meds:   amLODIPine  10 mg Oral Daily    apixaban  2.5 mg Oral BID    atorvastatin  80 mg Oral Daily    chlorhexidine  10 mL Mouth/Throat BID    collagenase   Topical (Top) Daily    fluticasone propionate  2 spray Each Nostril Daily    metoprolol tartrate  25 mg Oral BID    mupirocin   Topical (Top) Daily    scopolamine  1 patch Transdermal Q3 Days    sertraline  25 mg Oral Daily    sodium hypochlorite 0.125%   Topical (Top) BID    tacrolimus  0.5 mg Oral Once    tacrolimus  1 mg Oral Daily PM    [START ON 8/8/2022] tacrolimus  1.5 mg Oral Daily AM     Continuous Infusions:  PRN Meds:acetaminophen, albuterol-ipratropium, aluminum-magnesium hydroxide-simethicone, benzonatate, dextrose 10%, dextrose 10%, glucagon (human recombinant), guaiFENesin 100 mg/5 ml, hydrALAZINE, insulin aspart U-100, melatonin, morphine, ondansetron, oxyCODONE-acetaminophen, sodium chloride 0.9%, sodium chloride 0.9%, sodium chloride 0.9%    Review of patient's allergies indicates:  No Known Allergies  Active Hospital Problems    Diagnosis  POA    *Closed fracture of left tibia and fibula [S82.202A, S82.402A]  Yes    Pressure injury of left hip, stage 2 [L89.222]  Yes    Severe central sleep apnea comorbid with prescribed opioid use [F11.90, G47.37]  Yes    Kidney transplant recipient [Z94.0]  Not Applicable    Type 2 diabetes mellitus with stable  "proliferative retinopathy of both eyes, with long-term current use of insulin [E11.3553, Z79.4]  Not Applicable    Coronary artery disease of native artery of native heart with stable angina pectoris [I25.118]  Yes    Osteoarthritis of lumbar spine [M47.816]  Yes    Essential hypertension [I10]  Yes      Resolved Hospital Problems   No resolved problems to display.     Blood pressure (!) 170/79, pulse 73, temperature 98 °F (36.7 °C), temperature source Oral, resp. rate 19, height 5' 11" (1.803 m), weight 114.3 kg (251 lb 15.8 oz), SpO2 97 %.    Objective   Well-developed overweight male in no acute distress.  He is awake, alert, oriented.    Examination of left lower leg reveals the external fixation device intact.  He has the transmetatarsal amputation.  Dressings around the pins are in place.  Dressing over the medial calf wound is intact.    Glucose 146, albumin 2.3     Assessment & Plan   Patient with closed fractures of left tibia and fibula with external fixation due to the multiple skin wounds and his poor vascularity.  Continue with wound care measures.  Discussed keeping the leg better elevated.  Await discharge planning.    Nonweightbearing left lower extremity.  Plan to leave the external fixation device on for approximately 6 weeks and then below-knee casting until fracture heals.       8/7/2022        Good [place] MD, FAAOS [facility], Orthopedic Trauma Service  [place]    "

## 2022-08-07 NOTE — PROGRESS NOTES
[facility]  Progress Note    Patient Name: Lavelle Ladd  MRN: [id number]  Patient Class: IP- Inpatient   Admission Date: 8/4/2022  Length of Stay: 3 days  Attending Physician: Chapin Proctor MD  Primary Care Provider: Yahir Calzada MD        Subjective:     Principal Problem:Closed fracture of left tibia and fibula        HPI:  Patient is a 67 y.o.  male with a PMHx of HTN, CAD, DM, PVD, kidney transplant, COPD, right BKA, left transmetatarsal amputation who presents to the Emergency Department for evaluation of a fall which onset suddenly 5 hours ago. Patient states he was trying to get into his wheel chair when he slipped and fell. He reported not being able to put weight on it and laid on the ground for 4-5 hours before calling ems. Patient denied any issues with anesthesia with prior surgeries. Currently complains of pain and nausea. Otherwise denies any other issues.     In the ED, ankle xray showed distal tib fib fracture. Ortho notified and plans to operate later this afternoon. Patient was admitted to .               Overview/Hospital Course:  Pt postop day 1 status post closed reduction left tibial and fibular shaft fractures with application of external fixation device. PT/OT rec SNF placement. General  surgery consulted for eval of L lateral buttock wound, hold apixaban for now.     8/6 POD#2 patient lying in bed resting, c/o lower extremity and back pain. Pt s/p bedside debridement of L lateral buttock wound per general surgery. Resume apixaban. Nephrology consulted for subtherapeutic prograf level. Continue supportive management, awaiting SNF placement.  8/7 POD #3 No acute events overnight. Per surgery patient NWB to LLE. Plan to leave the external fixation device on for approximately 6 weeks and then below-knee casting until fracture heals. Continue supportive management. Plan SNF placement- patient selected [facility] for placement.   Anticipate discharge within 24-48 hours.          Past Medical History:   Diagnosis Date    DINORAH (acute kidney injury) 2016    Arthritis     CAD in native artery 2019    CHF (congestive heart failure)     Chronic obstructive pulmonary disease 2016    Coronary artery disease involving native coronary artery of native heart without angina pectoris 2016    CRI (chronic renal insufficiency) 2019     donor kidney transplant for DM 16     Induction with Thymo x3 and IV solumedrol to total 875mg  Kidney Biopsy  2016: 16 glomeruli, ACR type 1 AVR type 2, significant microcirculatory changes, c4d negative, No DSA, 5 to10% fibrosis. Treated with thymo x8 2016- no rejection      Diastolic heart failure     Encounter for blood transfusion     ESRD on RRT since 10/2013 10/29/2013    Biopsy proven diabetic nephropathy and lymphoplasmacytic interstitial infiltrate not c/w with AIN (ddx sjogrens or assoc with tamm-horsefall protein extravasation)     GERD (gastroesophageal reflux disease)     History of hepatitis C, s/p successful Rx w/ SVR12 - 2017    Completed 12 weeks harvoni w/ SVR    Hyperlipidemia     Hypertension     PAD (peripheral artery disease) 2019    PIC line (peripherally inserted central catheter) flush     Prophylactic immunotherapy     Proteinuria     PVD (peripheral vascular disease) 2017    RLE BKA CT 16 Extensive atherosclerotic disease of the aorta and mesenteric arteries.     Renal hypertension     Type 2 diabetes mellitus with diabetic neuropathy, with long-term current use of insulin 2016    Vitamin B12 deficiency        Past Surgical History:   Procedure Laterality Date    AORTOGRAPHY WITH SERIALOGRAPHY N/A 2018    Procedure: LEFT LEG ANGIOGRAM;  Surgeon: Donal Mcdonald MD;  Location: [facility] CATH LAB;  Service: Vascular;  Laterality: N/A;    APPLICATION OF LARGE EXTERNAL FIXATION DEVICE TO TIBIA Left  8/4/2022    Procedure: APPLICATION, EXTERNAL FIXATION DEVICE, LARGE, TIBIA;  Surgeon: Good Villa MD;  Location: [facility] OR;  Service: Orthopedics;  Laterality: Left;    av bovine graft      Left UE    AV FISTULA PLACEMENT      left UE    CARDIAC CATHETERIZATION  02/2015    CLOSED REDUCTION OF INJURY OF TIBIA Left 8/4/2022    Procedure: CLOSED REDUCTION, TIBIA;  Surgeon: Good Villa MD;  Location: [facility] OR;  Service: Orthopedics;  Laterality: Left;  Closed reduction left tibial and fibular shaft fractures    CLOSURE OF WOUND Left 9/24/2018    Procedure: CLOSURE, WOUND;  Surgeon: Karla Wheeler DPM;  Location: [facility] OR;  Service: Podiatry;  Laterality: Left;  Secondary Wound closure, extensive    CLOSURE OF WOUND Left 11/5/2018    Procedure: CLOSURE, WOUND;  Surgeon: Karla Wheeler DPM;  Location: [facility] OR;  Service: Podiatry;  Laterality: Left;    DEBRIDEMENT OF MULTIPLE METATARSAL BONES Left 11/5/2018    Procedure: DEBRIDEMENT, METATARSAL BONE, 2 OR MORE BONES;  Surgeon: Karla Wheeler DPM;  Location: [facility] OR;  Service: Podiatry;  Laterality: Left;    EXCISION OF SKIN Left 9/27/2019    Procedure: EXCISION, SKIN;  Surgeon: Lenard Alarcon MD;  Location: [address];  Service: Plastics;  Laterality: Left;  Plastics set, NIMS monitor, [employer]    FOOT AMPUTATION THROUGH METATARSAL Left 9/21/2018    Procedure: AMPUTATION, FOOT, TRANSMETATARSAL;  Surgeon: Karla Wheeler DPM;  Location: [facility] OR;  Service: Podiatry;  Laterality: Left;    FOOT AMPUTATION THROUGH METATARSAL Left 10/31/2018    Procedure: AMPUTATION, FOOT, TRANSMETATARSAL;  Surgeon: Karla Wheeler DPM;  Location: [facility] OR;  Service: Podiatry;  Laterality: Left;    FOOT AMPUTATION THROUGH METATARSAL Left 11/5/2018    Procedure: AMPUTATION, FOOT, TRANSMETATARSAL;  Surgeon: Karla Wheeler DPM;  Location: [facility] OR;  Service: Podiatry;  Laterality: Left;  revisional transmetatarsal amputation, Left foot    IRRIGATION  AND DEBRIDEMENT OF LOWER EXTREMITY Left 10/31/2018    Procedure: IRRIGATION AND DEBRIDEMENT, LOWER EXTREMITY;  Surgeon: Karla Wheeler DPM;  Location: [facility] OR;  Service: Podiatry;  Laterality: Left;    KIDNEY TRANSPLANT  05/21/2016    LEFT HEART CATHETERIZATION Left 7/21/2019    Procedure: CATHETERIZATION, HEART, LEFT;  Surgeon: Andrew Valdez MD;  Location: [facility] CATH LAB;  Service: Cardiology;  Laterality: Left;    LEG AMPUTATION THROUGH KNEE  2011    right LE, started as nail puncture leading to diabetic ulcer    SKIN FULL THICKNESS GRAFT Left 10/7/2019    Procedure: APPLICATION, GRAFT, SKIN, FULL-THICKNESS;  Surgeon: Lenard Alarcon MD;  Location: [facility] OR [address] [place] [address];  Service: Plastics;  Laterality: Left;    SURGICAL REMOVAL OF LESION OF FACE Right 10/7/2019    Procedure: EXCISION, LESION, FACE;  Surgeon: Lenard Alarcon MD;  [facility];  Service: Plastics;  Laterality: Right;       Review of patient's allergies indicates:  No Known Allergies    No current facility-administered medications on file prior to encounter.     Current Outpatient Medications on File Prior to Encounter   Medication Sig    amLODIPine (NORVASC) 10 MG tablet Take 1 tablet (10 mg total) by mouth once daily.    aspirin (ECOTRIN) 81 MG EC tablet Take 1 tablet (81 mg total) by mouth once daily.    atorvastatin (LIPITOR) 80 MG tablet Take 1 tablet (80 mg total) by mouth once daily.    clopidogreL (PLAVIX) 75 mg tablet Take 1 tablet (75 mg total) by mouth once daily.    ergocalciferol (ERGOCALCIFEROL) 50,000 unit Cap Take 1 capsule (50,000 Units total) by mouth every 7 days. Take on Mondays    furosemide (LASIX) 40 MG tablet Take 1 tablet (40 mg total) by mouth daily as needed (Leg swelling, Nocturnal SOB).    gabapentin (NEURONTIN) 300 MG capsule Take 300 mg by mouth 3 (three) times daily.    HYDROcodone-acetaminophen (NORCO)  mg per tablet 1 tablet by Per G Tube route every 6 (six) hours as needed for Pain.     hydrocortisone 2.5 % cream Apply topically 2 (two) times daily.    insulin aspart U-100 (NOVOLOG) 100 unit/mL (3 mL) InPn pen Inject 5 units three times a day with meal if BG > 300.    levalbuterol (XOPENEX) 1.25 mg/3 mL nebulizer solution Take 3 mLs (1.25 mg total) by nebulization every 6 to 8 hours as needed for Wheezing or Shortness of Breath.    LIDOcaine (LIDODERM) 5 % Place 1 patch onto the skin daily as needed. 12 hours on and 12 hours off    metoprolol tartrate (LOPRESSOR) 25 MG tablet Take 1 tablet (25 mg total) by mouth 2 (two) times daily.    mycophenolate (CELLCEPT) 250 mg Cap Take 1 capsule (250 mg total) by mouth 2 (two) times daily.    nitroGLYCERIN (NITROSTAT) 0.4 MG SL tablet Place 1 tablet (0.4 mg total) under the tongue every 5 (five) minutes as needed.    ondansetron (ZOFRAN-ODT) 4 MG TbDL Take 1 tablet (4 mg total) by mouth every 8 (eight) hours as needed.    sevelamer carbonate (RENVELA) 800 mg Tab Take 800 mg by mouth 3 (three) times daily with meals.    tacrolimus (PROGRAF) 0.5 MG Cap Take 2 capsules (1 mg total) by mouth every 12 (twelve) hours.    cloNIDine (CATAPRES) 0.1 MG tablet Take 1 tablet (0.1 mg total) by mouth 3 (three) times daily. (Patient not taking: Reported on 8/4/2022)    flash glucose scanning reader (FREESTYLE BRYAN 14 DAY READER) Misc 1 Device by Misc.(Non-Drug; Combo Route) route as needed.    flash glucose sensor (FREESTYLE BRYAN 14 DAY SENSOR) Kit 1 kit by Misc.(Non-Drug; Combo Route) route every 14 (fourteen) days.    ipratropium (ATROVENT) 0.02 % nebulizer solution Take 2.5 mLs (500 mcg total) by nebulization 4 (four) times daily. (Patient not taking: Reported on 8/4/2022)    isosorbide mononitrate (IMDUR) 30 MG 24 hr tablet Take 2 tablets (60 mg total) by mouth once daily. (Patient not taking: Reported on 8/4/2022)    ketoconazole (NIZORAL) 2 % cream Apply topically 2 (two) times daily.    linaCLOtide (LINZESS) 72 mcg Cap capsule Take 1 capsule  (72 mcg total) by mouth before breakfast.    naloxone (NARCAN) 4 mg/actuation Spry 1 spray by Nasal route once.    semaglutide (OZEMPIC) 1 mg/dose (2 mg/1.5 mL) PnIj Inject 1 mg under the skin every 7 days. (Patient taking differently: ())    sertraline (ZOLOFT) 25 MG tablet Take 1 tablet (25 mg total) by mouth once daily. For depression and anxiety (Patient not taking: Reported on 2022)    VIOS AEROSOL DELIVERY SYSTEM [place] USE Q 4 H PRN     Family History       Problem Relation (Age of Onset)    Cancer Father    Diabetes Father    Heart failure Father, Mother    Stroke Father          Tobacco Use    Smoking status: Former Smoker     Packs/day: 1.00     Years: 40.00     Pack years: 40.00     Quit date: 2013     Years since quittin.5    Smokeless tobacco: Never Used   Substance and Sexual Activity    Alcohol use: Yes     Alcohol/week: 6.0 standard drinks     Types: 6 Cans of beer per week     Comment: seldom    Drug use: No    Sexual activity: Never     Review of Systems   Constitutional:  Negative for fatigue and fever.   HENT:  Negative for sinus pressure.    Eyes:  Negative for visual disturbance.   Respiratory:  Negative for shortness of breath.    Cardiovascular:  Negative for chest pain.   Gastrointestinal:  Negative for vomiting.   Genitourinary:  Negative for difficulty urinating.   Musculoskeletal:  Positive for arthralgias, back pain and gait problem.   Skin:  Negative for rash.   Neurological:  Negative for headaches.   Psychiatric/Behavioral:  Negative for confusion.    Objective:     Vital Signs (Most Recent):  Temp: 97.8 °F (36.6 °C) (22 1641)  Pulse: 68 (22 1641)  Resp: 14 (22 1733)  BP: 136/67 (22 1641)  SpO2: 95 % (22 1641)   Vital Signs (24h Range):  Temp:  [97.5 °F (36.4 °C)-98 °F (36.7 °C)] 97.8 °F (36.6 °C)  Pulse:  [68-73] 68  Resp:  [14-19] 14  SpO2:  [93 %-97 %] 95 %  BP: (136-174)/(66-79) 136/67     Weight: 114.3 kg (251 lb 15.8  oz)  Body mass index is 35.14 kg/m².    Physical Exam  Constitutional:       General: He is not in acute distress.     Appearance: He is well-developed. He is obese. He is not diaphoretic.   HENT:      Head: Normocephalic and atraumatic.   Eyes:      Pupils: Pupils are equal, round, and reactive to light.   Cardiovascular:      Rate and Rhythm: Normal rate and regular rhythm.      Heart sounds: Normal heart sounds. No murmur heard.    No friction rub. No gallop.   Pulmonary:      Effort: Pulmonary effort is normal. No respiratory distress.      Breath sounds: Normal breath sounds. No stridor. No wheezing or rales.   Abdominal:      General: Bowel sounds are normal. There is no distension.      Palpations: Abdomen is soft. There is no mass.      Tenderness: There is no abdominal tenderness. There is no guarding.   Musculoskeletal:      Comments: Right bka, left transmetatarsal amputation   Skin:     General: Skin is warm.      Findings: No erythema.   Neurological:      Mental Status: He is alert and oriented to person, place, and time.         CRANIAL NERVES     CN III, IV, VI   Pupils are equal, round, and reactive to light.     Significant Labs:   Results for orders placed or performed during the hospital encounter of 08/04/22   CBC auto differential   Result Value Ref Range    WBC 5.41 3.90 - 12.70 K/uL    RBC 4.28 (L) 4.60 - 6.20 M/uL    Hemoglobin 13.0 (L) 14.0 - 18.0 g/dL    Hematocrit 42.6 40.0 - 54.0 %     (H) 82 - 98 fL    MCH 30.4 27.0 - 31.0 pg    MCHC 30.5 (L) 32.0 - 36.0 g/dL    RDW 13.5 11.5 - 14.5 %    Platelets 201 150 - 450 K/uL    MPV 9.2 9.2 - 12.9 fL    Immature Granulocytes 0.4 0.0 - 0.5 %    Gran # (ANC) 3.7 1.8 - 7.7 K/uL    Immature Grans (Abs) 0.02 0.00 - 0.04 K/uL    Lymph # 1.0 1.0 - 4.8 K/uL    Mono # 0.6 0.3 - 1.0 K/uL    Eos # 0.0 0.0 - 0.5 K/uL    Baso # 0.02 0.00 - 0.20 K/uL    nRBC 0 0 /100 WBC    Gran % 69.1 38.0 - 73.0 %    Lymph % 18.1 18.0 - 48.0 %    Mono % 11.8 4.0 -  15.0 %    Eosinophil % 0.2 0.0 - 8.0 %    Basophil % 0.4 0.0 - 1.9 %    Differential Method Automated    Comprehensive metabolic panel   Result Value Ref Range    Sodium 136 136 - 145 mmol/L    Potassium 4.9 3.5 - 5.1 mmol/L    Chloride 102 95 - 110 mmol/L    CO2 24 23 - 29 mmol/L    Glucose 250 (H) 70 - 110 mg/dL    BUN 30 (H) 8 - 23 mg/dL    Creatinine 1.8 (H) 0.5 - 1.4 mg/dL    Calcium 8.4 (L) 8.7 - 10.5 mg/dL    Total Protein 6.7 6.0 - 8.4 g/dL    Albumin 3.0 (L) 3.5 - 5.2 g/dL    Total Bilirubin 0.5 0.1 - 1.0 mg/dL    Alkaline Phosphatase 156 (H) 55 - 135 U/L     (H) 10 - 40 U/L    ALT 73 (H) 10 - 44 U/L    Anion Gap 10 8 - 16 mmol/L    eGFR 40 (A) >60 mL/min/1.73 m^2   COVID-19 Rapid Screening   Result Value Ref Range    SARS-CoV-2 RNA, Amplification, Qual Negative Negative   Hemoglobin A1c if not done in past 3 months   Result Value Ref Range    Hemoglobin A1C 7.4 (H) 4.0 - 5.6 %    Estimated Avg Glucose 166 (H) 68 - 131 mg/dL   CBC Auto Differential   Result Value Ref Range    WBC 4.67 3.90 - 12.70 K/uL    RBC 3.76 (L) 4.60 - 6.20 M/uL    Hemoglobin 11.7 (L) 14.0 - 18.0 g/dL    Hematocrit 37.4 (L) 40.0 - 54.0 %     (H) 82 - 98 fL    MCH 31.1 (H) 27.0 - 31.0 pg    MCHC 31.3 (L) 32.0 - 36.0 g/dL    RDW 13.4 11.5 - 14.5 %    Platelets 155 150 - 450 K/uL    MPV 9.4 9.2 - 12.9 fL    Immature Granulocytes 0.2 0.0 - 0.5 %    Gran # (ANC) 3.0 1.8 - 7.7 K/uL    Immature Grans (Abs) 0.01 0.00 - 0.04 K/uL    Lymph # 1.0 1.0 - 4.8 K/uL    Mono # 0.7 0.3 - 1.0 K/uL    Eos # 0.0 0.0 - 0.5 K/uL    Baso # 0.01 0.00 - 0.20 K/uL    nRBC 0 0 /100 WBC    Gran % 64.5 38.0 - 73.0 %    Lymph % 21.0 18.0 - 48.0 %    Mono % 13.9 4.0 - 15.0 %    Eosinophil % 0.2 0.0 - 8.0 %    Basophil % 0.2 0.0 - 1.9 %    Differential Method Automated    Basic Metabolic Panel   Result Value Ref Range    Sodium 135 (L) 136 - 145 mmol/L    Potassium 4.2 3.5 - 5.1 mmol/L    Chloride 105 95 - 110 mmol/L    CO2 21 (L) 23 - 29 mmol/L     Glucose 159 (H) 70 - 110 mg/dL    BUN 26 (H) 8 - 23 mg/dL    Creatinine 1.5 (H) 0.5 - 1.4 mg/dL    Calcium 7.7 (L) 8.7 - 10.5 mg/dL    Anion Gap 9 8 - 16 mmol/L    eGFR 50 (A) >60 mL/min/1.73 m^2   Magnesium   Result Value Ref Range    Magnesium 1.8 1.6 - 2.6 mg/dL   Tacrolimus level   Result Value Ref Range    Tacrolimus Lvl 2.0 (L) 5.0 - 15.0 ng/mL   Comprehensive Metabolic Panel   Result Value Ref Range    Sodium 135 (L) 136 - 145 mmol/L    Potassium 4.2 3.5 - 5.1 mmol/L    Chloride 105 95 - 110 mmol/L    CO2 23 23 - 29 mmol/L    Glucose 163 (H) 70 - 110 mg/dL    BUN 15 8 - 23 mg/dL    Creatinine 1.1 0.5 - 1.4 mg/dL    Calcium 7.8 (L) 8.7 - 10.5 mg/dL    Total Protein 5.3 (L) 6.0 - 8.4 g/dL    Albumin 2.3 (L) 3.5 - 5.2 g/dL    Total Bilirubin 0.7 0.1 - 1.0 mg/dL    Alkaline Phosphatase 120 55 - 135 U/L    AST 35 10 - 40 U/L    ALT 19 10 - 44 U/L    Anion Gap 7 (L) 8 - 16 mmol/L    eGFR >60 >60 mL/min/1.73 m^2   Tacrolimus level   Result Value Ref Range    Tacrolimus Lvl 6.4 5.0 - 15.0 ng/mL   Renal Function Panel   Result Value Ref Range    Glucose 146 (H) 70 - 110 mg/dL    Sodium 135 (L) 136 - 145 mmol/L    Potassium 4.2 3.5 - 5.1 mmol/L    Chloride 104 95 - 110 mmol/L    CO2 23 23 - 29 mmol/L    BUN 16 8 - 23 mg/dL    Calcium 7.9 (L) 8.7 - 10.5 mg/dL    Creatinine 1.2 0.5 - 1.4 mg/dL    Albumin 2.3 (L) 3.5 - 5.2 g/dL    Phosphorus 2.2 (L) 2.7 - 4.5 mg/dL    eGFR >60 >60 mL/min/1.73 m^2    Anion Gap 8 8 - 16 mmol/L   Magnesium   Result Value Ref Range    Magnesium 2.0 1.6 - 2.6 mg/dL   Echo Saline Bubble? No   Result Value Ref Range    BSA 2.27 m2    TDI SEPTAL 0.09 m/s    LV LATERAL E/E' RATIO 8.45 m/s    LV SEPTAL E/E' RATIO 10.33 m/s    LA WIDTH 3.60 cm    IVC diameter 1.30 cm    Left Ventricular Outflow Tract Mean Velocity 0.45531782837 cm/s    Left Ventricular Outflow Tract Mean Gradient 2.13 mmHg    TDI LATERAL 0.11 m/s    LVIDd 3.72 3.5 - 6.0 cm    IVS 1.59 (A) 0.6 - 1.1 cm    Posterior Wall 1.78 (A) 0.6  - 1.1 cm    Ao root annulus 3.36 cm    LVIDs 2.45 2.1 - 4.0 cm    FS 34 28 - 44 %    LA volume 57.29 cm3    Sinus 3.49 cm    STJ 3.49 cm    Ascending aorta 3.28 cm    LV mass 254.30 g    LA size 3.62 cm    Left Ventricle Relative Wall Thickness 0.96 cm    AV mean gradient 3 mmHg    AV valve area 2.80 cm2    AV Velocity Ratio 0.75     AV index (prosthetic) 0.75     MV valve area p 1/2 method 3.53 cm2    E/A ratio 0.81     Mean e' 0.10 m/s    E wave deceleration time [id number] msec    IVRT 82.030030550 msec    LVOT diameter 2.18 cm    LVOT area 3.7 cm2    LVOT peak honorio 0.84 m/s    LVOT peak VTI 18.60 cm    Ao peak honorio 1.12 m/s    Ao VTI 24.8 cm    RVOT peak honorio 0.91 m/s    RVOT peak VTI 18.1 cm    LVOT stroke volume 69.39 cm3    AV peak gradient 5 mmHg    PV mean gradient 2.63 mmHg    E/E' ratio 9.30 m/s    MV Peak E Honorio 0.93 m/s    TR Max Honorio 2.53 m/s    MV stenosis pressure 1/2 time [id number] ms    MV Peak A Honorio 1.15 m/s    LV Systolic Volume 21.24 mL    LV Systolic Volume Index 9.6 mL/m2    LV Diastolic Volume 58.87 mL    LV Diastolic Volume Index 26.52 mL/m2    LA Volume Index 25.8 mL/m2    LV Mass Index 115 g/m2    RA Major Axis 4.42 cm    Left Atrium Minor Axis 4.79 cm    Left Atrium Major Axis 5.62 cm    Triscuspid Valve Regurgitation Peak Gradient 26 mmHg    RA Width 2.57 cm    Right Atrial Pressure (from IVC) 3 mmHg    EF 60 %    TV rest pulmonary artery pressure 29 mmHg   POCT glucose   Result Value Ref Range    POCT Glucose 263 (H) 70 - 110 mg/dL   POCT glucose   Result Value Ref Range    POCT Glucose 264 (H) 70 - 110 mg/dL   POCT glucose   Result Value Ref Range    POCT Glucose 192 (H) 70 - 110 mg/dL   POCT glucose   Result Value Ref Range    POCT Glucose 210 (H) 70 - 110 mg/dL   POCT glucose   Result Value Ref Range    POCT Glucose 157 (H) 70 - 110 mg/dL   POCT glucose   Result Value Ref Range    POCT Glucose 178 (H) 70 - 110 mg/dL   POCT glucose   Result Value Ref Range    POCT Glucose  193 (H) 70 - 110 mg/dL   POCT glucose   Result Value Ref Range    POCT Glucose 189 (H) 70 - 110 mg/dL   POCT glucose   Result Value Ref Range    POCT Glucose 183 (H) 70 - 110 mg/dL   POCT glucose   Result Value Ref Range    POCT Glucose 162 (H) 70 - 110 mg/dL   POCT glucose   Result Value Ref Range    POCT Glucose 160 (H) 70 - 110 mg/dL   POCT glucose   Result Value Ref Range    POCT Glucose 152 (H) 70 - 110 mg/dL   POCT glucose   Result Value Ref Range    POCT Glucose 143 (H) 70 - 110 mg/dL   POCT glucose   Result Value Ref Range    POCT Glucose 163 (H) 70 - 110 mg/dL   POCT glucose   Result Value Ref Range    POCT Glucose 166 (H) 70 - 110 mg/dL     *Note: Due to a large number of results and/or encounters for the requested time period, some results have not been displayed. A complete set of results can be found in Results Review.        Significant Imaging: X-Ray Ankle Complete Left  Narrative: EXAMINATION:  XR ANKLE COMPLETE 3 VIEW LEFT    CLINICAL HISTORY:  intraop;    COMPARISON:  None  Impression: FINDINGS/  Intraoperative fluoroscopic images were obtained.    Total fluoro time was 1.4 minute and 2 fluoroscopic images were obtained.  See operative report.    Electronically signed by: Ryna Joya MD  Date:    08/04/2022  Time:    15:55  SURG [place] Surgery Fluoro Usage  See OP Notes for results.     IMPRESSION: See OP Notes for results.     This procedure was auto-finalized by: Virtual Radiologist  Echo Saline Bubble? No  · The left ventricle is normal in size with moderate concentric   hypertrophy and normal systolic function.  · The estimated ejection fraction is 60%.  · Normal left ventricular diastolic function.  · Normal right ventricular size with normal right ventricular systolic   function.  · Mild tricuspid regurgitation.  · Normal central venous pressure (3 mmHg).  · The estimated PA systolic pressure is 29 mmHg.     X-Ray Chest AP Portable  Narrative: EXAMINATION:  XR CHEST AP PORTABLE    CLINICAL  HISTORY:  Encounter for other preprocedural examination    FINDINGS:  Single view of the chest.  08/15/2021 comparison    Cardiac silhouette is normal.  Aorta demonstrates atherosclerotic disease. The lungs demonstrate no evidence of active disease.  Mild bibasilar atelectasis.  No evidence of pleural effusion or pneumothorax.  Bones appear intact demonstrate scattered degenerative change.  Impression: No acute process seen.    Electronically signed by: Ryan Joya MD  Date:    08/04/2022  Time:    07:14  X-Ray Tibia Fibula 2 View Left  Narrative: EXAMINATION:  XR TIBIA FIBULA 2 VIEW LEFT; XR ANKLE COMPLETE 3 VIEW LEFT    CLINICAL HISTORY:  XR TIBIA FIBULA 2 VIEW LEFT; XR ANKLE COMPLETE 3 VIEW LEFTUnspecified fall, initial encounter    COMPARISON:  None    FINDINGS:  Four views of the left tibia/fibula and three views of the left ankle were obtained.    Comminuted tibial metaphyseal fracture with angulation and mild displacement.  There is extension to the medial articular surface and involvement of the interosseous membrane between the tibia and fibula.  Transverse fracture of the distal fibula above the lateral malleolus with mild displacement and angulation.  Mildly comminuted proximal fibular fracture.  Moderate joint effusion of the ankle.  Small avulsion injury at the tip of the medial malleolus    Diffuse osteopenia and moderate soft tissue swelling.  Dense vascular calcifications.  Moderate degenerative changes of the ankle and knee joints.  Advanced degenerative changes within the midfoot.  Large plantar calcaneal spur.  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  Impression: See above.    Electronically signed by: Ryan Joya MD  Date:    08/04/2022  Time:    06:59  X-Ray Ankle Complete Left  Narrative: EXAMINATION:  XR TIBIA FIBULA 2 VIEW LEFT; XR ANKLE COMPLETE 3 VIEW LEFT    CLINICAL HISTORY:  XR TIBIA FIBULA 2 VIEW LEFT; XR ANKLE COMPLETE 3 VIEW LEFTUnspecified  fall, initial encounter    COMPARISON:  None    FINDINGS:  Four views of the left tibia/fibula and three views of the left ankle were obtained.    Comminuted tibial metaphyseal fracture with angulation and mild displacement.  There is extension to the medial articular surface and involvement of the interosseous membrane between the tibia and fibula.  Transverse fracture of the distal fibula above the lateral malleolus with mild displacement and angulation.  Mildly comminuted proximal fibular fracture.  Moderate joint effusion of the ankle.  Small avulsion injury at the tip of the medial malleolus    Diffuse osteopenia and moderate soft tissue swelling.  Dense vascular calcifications.  Moderate degenerative changes of the ankle and knee joints.  Advanced degenerative changes within the midfoot.  Large plantar calcaneal spur.  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  Impression: See above.    Electronically signed by: Ryan Joya MD  Date:    08/04/2022  Time:    06:59        Assessment/Plan:      * Closed fracture of left tibia and fibula  Patient with distal tib fib fracture   Ortho consulted on case and plans to operate later today  Cont npo  Morphine PRN  Patient with numerous comorbid conditions including  Cad, pvd, copd, diabetes  Poor functional capacity at home with METS < 4  Chest xray unremarkable  Ekg showed first degree AV block  Will obtain stat echo to evaluate LV function  Pt at least moderate risk for cardiac complications    Advance Care Planning     Patient is a full code and sdm is his sister.     8/5/22 POD#1   NWB LLE per ortho recs Plan to leave the external fixation device on for approximately 6 weeks and then below-knee casting until fracture heals.   Continue pain management  PT/OT rec SNF     Pressure injury of left hip, stage 2  S/p bedside debridement per general surgery       Severe central sleep apnea comorbid with prescribed opioid use  cpap qhs        Kidney transplant recipient  Resume tacrolimus  Hold cellcept     8/6   Prograf level subtherapeutic, consult nephrology     Type 2 diabetes mellitus with stable proliferative retinopathy of both eyes, with long-term current use of insulin  Patient's FSGs are uncontrolled due to hyperglycemia on current medication regimen.  Last A1c reviewed-   Lab Results   Component Value Date    HGBA1C 9.1 (H) 06/26/2020     Most recent fingerstick glucose reviewed- No results for input(s): POCTGLUCOSE in the last 24 hours.  Current correctional scale  High  Maintain anti-hyperglycemic dose as follows-   Antihyperglycemics (From admission, onward)            Start     Stop Route Frequency Ordered    08/04/22 0845  insulin aspart U-100 pen 1-10 Units         -- SubQ Every 6 hours PRN 08/04/22 0745        Hold Oral hypoglycemics while patient is in the hospital.    Coronary artery disease of native artery of native heart with stable angina pectoris  Resume statin   Hold asa and plavix         Osteoarthritis of lumbar spine  Chronic pain  Seeing pain management outpatient       Essential hypertension  Resume home metoprolol  Hydralazine PRN        VTE Risk Mitigation (From admission, onward)         Ordered     IP VTE HIGH RISK PATIENT  Once         08/04/22 1601     Place sequential compression device  Until discontinued         08/04/22 0608                Discharge Planning   LISETH:  8/8/22    Code Status: Full Code   Is the patient medically ready for discharge?:     Reason for patient still in hospital (select all that apply): Pending disposition SNF placement   Discharge Plan A: Skilled Nursing Facility                  Sherita Christensen NP  Department of Hospital Medicine   [facility] - Med Surg

## 2022-08-07 NOTE — PT/OT/SLP PROGRESS
"Physical Therapy      Patient Name:  Lavelle Ladd   MRN:  [id number]    Patient refused tx stating "you want to do this right now? When the races are starting?" He continued to explain that his pain was too bad and that he recently needed morphine. I was unable to accommodate pt with therapy at a different time due to it being the end of my shift.      Bernice Ann  PTA      "

## 2022-08-07 NOTE — ASSESSMENT & PLAN NOTE
Patient with distal tib fib fracture   Ortho consulted on case and plans to operate later today  Cont npo  Morphine PRN  Patient with numerous comorbid conditions including  Cad, pvd, copd, diabetes  Poor functional capacity at home with METS < 4  Chest xray unremarkable  Ekg showed first degree AV block  Will obtain stat echo to evaluate LV function  Pt at least moderate risk for cardiac complications    Advance Care Planning     Patient is a full code and sdm is his sister.      8/5/22 POD#1   NWB LLE per ortho recs Plan to leave the external fixation device on for approximately 6 weeks and then below-knee casting until fracture heals.   Continue pain management  PT/OT rec SNF

## 2022-08-07 NOTE — SUBJECTIVE & OBJECTIVE
Past Medical History:   Diagnosis Date    DINORAH (acute kidney injury) 2016    Arthritis     CAD in native artery 2019    CHF (congestive heart failure)     Chronic obstructive pulmonary disease 2016    Coronary artery disease involving native coronary artery of native heart without angina pectoris 2016    CRI (chronic renal insufficiency) 2019     donor kidney transplant for DM 16     Induction with Thymo x3 and IV solumedrol to total 875mg  Kidney Biopsy  2016: 16 glomeruli, ACR type 1 AVR type 2, significant microcirculatory changes, c4d negative, No DSA, 5 to10% fibrosis. Treated with thymo x8 2016- no rejection      Diastolic heart failure     Encounter for blood transfusion     ESRD on RRT since 10/2013 10/29/2013    Biopsy proven diabetic nephropathy and lymphoplasmacytic interstitial infiltrate not c/w with AIN (ddx sjogrens or assoc with tamm-horsefall protein extravasation)     GERD (gastroesophageal reflux disease)     History of hepatitis C, s/p successful Rx w/ SVR12 - 2017    Completed 12 weeks harvoni w/ SVR    Hyperlipidemia     Hypertension     PAD (peripheral artery disease) 2019    PIC line (peripherally inserted central catheter) flush     Prophylactic immunotherapy     Proteinuria     PVD (peripheral vascular disease) 2017    RLE BKA CT 16 Extensive atherosclerotic disease of the aorta and mesenteric arteries.     Renal hypertension     Type 2 diabetes mellitus with diabetic neuropathy, with long-term current use of insulin 2016    Vitamin B12 deficiency        Past Surgical History:   Procedure Laterality Date    AORTOGRAPHY WITH SERIALOGRAPHY N/A 2018    Procedure: LEFT LEG ANGIOGRAM;  Surgeon: Donal Mcdonald MD;  Location: [facility] CATH LAB;  Service: Vascular;  Laterality: N/A;    APPLICATION OF LARGE EXTERNAL FIXATION DEVICE TO TIBIA Left 2022    Procedure: APPLICATION, EXTERNAL FIXATION  DEVICE, LARGE, TIBIA;  Surgeon: Good Villa MD;  Location: [facility] OR;  Service: Orthopedics;  Laterality: Left;    av bovine graft      Left UE    AV FISTULA PLACEMENT      left UE    CARDIAC CATHETERIZATION  02/2015    CLOSED REDUCTION OF INJURY OF TIBIA Left 8/4/2022    Procedure: CLOSED REDUCTION, TIBIA;  Surgeon: Good Villa MD;  Location: [facility] OR;  Service: Orthopedics;  Laterality: Left;  Closed reduction left tibial and fibular shaft fractures    CLOSURE OF WOUND Left 9/24/2018    Procedure: CLOSURE, WOUND;  Surgeon: Karla Wheeler DPM;  Location: [facility] OR;  Service: Podiatry;  Laterality: Left;  Secondary Wound closure, extensive    CLOSURE OF WOUND Left 11/5/2018    Procedure: CLOSURE, WOUND;  Surgeon: Karla Wheeler DPM;  Location: [facility] OR;  Service: Podiatry;  Laterality: Left;    DEBRIDEMENT OF MULTIPLE METATARSAL BONES Left 11/5/2018    Procedure: DEBRIDEMENT, METATARSAL BONE, 2 OR MORE BONES;  Surgeon: Karla Wheeler DPM;  Location: [facility] OR;  Service: Podiatry;  Laterality: Left;    EXCISION OF SKIN Left 9/27/2019    Procedure: EXCISION, SKIN;  Surgeon: Lenard Alarcon MD;  Location: [address];  Service: Plastics;  Laterality: Left;  Plastics set, NIMS monitor, [employer]    FOOT AMPUTATION THROUGH METATARSAL Left 9/21/2018    Procedure: AMPUTATION, FOOT, TRANSMETATARSAL;  Surgeon: Karla Wheeler DPM;  Location: [facility] OR;  Service: Podiatry;  Laterality: Left;    FOOT AMPUTATION THROUGH METATARSAL Left 10/31/2018    Procedure: AMPUTATION, FOOT, TRANSMETATARSAL;  Surgeon: Karla Wheeler DPM;  Location: [facility] OR;  Service: Podiatry;  Laterality: Left;    FOOT AMPUTATION THROUGH METATARSAL Left 11/5/2018    Procedure: AMPUTATION, FOOT, TRANSMETATARSAL;  Surgeon: Karla Wheeler DPM;  Location: [facility] OR;  Service: Podiatry;  Laterality: Left;  revisional transmetatarsal amputation, Left foot    IRRIGATION AND DEBRIDEMENT OF LOWER EXTREMITY Left 10/31/2018     Procedure: IRRIGATION AND DEBRIDEMENT, LOWER EXTREMITY;  Surgeon: Karla Wheeler DPM;  Location: [facility] OR;  Service: Podiatry;  Laterality: Left;    KIDNEY TRANSPLANT  05/21/2016    LEFT HEART CATHETERIZATION Left 7/21/2019    Procedure: CATHETERIZATION, HEART, LEFT;  Surgeon: Andrew Valdez MD;  Location: [facility] CATH LAB;  Service: Cardiology;  Laterality: Left;    LEG AMPUTATION THROUGH KNEE  2011    right LE, started as nail puncture leading to diabetic ulcer    SKIN FULL THICKNESS GRAFT Left 10/7/2019    Procedure: APPLICATION, GRAFT, SKIN, FULL-THICKNESS;  Surgeon: Lenard Alarcon MD;  Location: [address];  Service: Plastics;  Laterality: Left;    SURGICAL REMOVAL OF LESION OF FACE Right 10/7/2019    Procedure: EXCISION, LESION, FACE;  Surgeon: Lenard Alarcon MD;  Location: [facility] OR [address] [place] [address];  Service: Plastics;  Laterality: Right;       Review of patient's allergies indicates:  No Known Allergies    No current facility-administered medications on file prior to encounter.     Current Outpatient Medications on File Prior to Encounter   Medication Sig    amLODIPine (NORVASC) 10 MG tablet Take 1 tablet (10 mg total) by mouth once daily.    aspirin (ECOTRIN) 81 MG EC tablet Take 1 tablet (81 mg total) by mouth once daily.    atorvastatin (LIPITOR) 80 MG tablet Take 1 tablet (80 mg total) by mouth once daily.    clopidogreL (PLAVIX) 75 mg tablet Take 1 tablet (75 mg total) by mouth once daily.    ergocalciferol (ERGOCALCIFEROL) 50,000 unit Cap Take 1 capsule (50,000 Units total) by mouth every 7 days. Take on Mondays    furosemide (LASIX) 40 MG tablet Take 1 tablet (40 mg total) by mouth daily as needed (Leg swelling, Nocturnal SOB).    gabapentin (NEURONTIN) 300 MG capsule Take 300 mg by mouth 3 (three) times daily.    HYDROcodone-acetaminophen (NORCO)  mg per tablet 1 tablet by Per G Tube route every 6 (six) hours as needed for Pain.    hydrocortisone 2.5 % cream Apply topically 2 (two)  times daily.    insulin aspart U-100 (NOVOLOG) 100 unit/mL (3 mL) InPn pen Inject 5 units three times a day with meal if BG > 300.    levalbuterol (XOPENEX) 1.25 mg/3 mL nebulizer solution Take 3 mLs (1.25 mg total) by nebulization every 6 to 8 hours as needed for Wheezing or Shortness of Breath.    LIDOcaine (LIDODERM) 5 % Place 1 patch onto the skin daily as needed. 12 hours on and 12 hours off    metoprolol tartrate (LOPRESSOR) 25 MG tablet Take 1 tablet (25 mg total) by mouth 2 (two) times daily.    mycophenolate (CELLCEPT) 250 mg Cap Take 1 capsule (250 mg total) by mouth 2 (two) times daily.    nitroGLYCERIN (NITROSTAT) 0.4 MG SL tablet Place 1 tablet (0.4 mg total) under the tongue every 5 (five) minutes as needed.    ondansetron (ZOFRAN-ODT) 4 MG TbDL Take 1 tablet (4 mg total) by mouth every 8 (eight) hours as needed.    sevelamer carbonate (RENVELA) 800 mg Tab Take 800 mg by mouth 3 (three) times daily with meals.    tacrolimus (PROGRAF) 0.5 MG Cap Take 2 capsules (1 mg total) by mouth every 12 (twelve) hours.    cloNIDine (CATAPRES) 0.1 MG tablet Take 1 tablet (0.1 mg total) by mouth 3 (three) times daily. (Patient not taking: Reported on 8/4/2022)    flash glucose scanning reader (FREESTYLE BRYAN 14 DAY READER) Misc 1 Device by Misc.(Non-Drug; Combo Route) route as needed.    flash glucose sensor (FREESTYLE BRYAN 14 DAY SENSOR) Kit 1 kit by Misc.(Non-Drug; Combo Route) route every 14 (fourteen) days.    ipratropium (ATROVENT) 0.02 % nebulizer solution Take 2.5 mLs (500 mcg total) by nebulization 4 (four) times daily. (Patient not taking: Reported on 8/4/2022)    isosorbide mononitrate (IMDUR) 30 MG 24 hr tablet Take 2 tablets (60 mg total) by mouth once daily. (Patient not taking: Reported on 8/4/2022)    ketoconazole (NIZORAL) 2 % cream Apply topically 2 (two) times daily.    linaCLOtide (LINZESS) 72 mcg Cap capsule Take 1 capsule (72 mcg total) by mouth before breakfast.    naloxone  (NARCAN) 4 mg/actuation Spry 1 spray by Nasal route once.    semaglutide (OZEMPIC) 1 mg/dose (2 mg/1.5 mL) PnIj Inject 1 mg under the skin every 7 days. (Patient taking differently: ())    sertraline (ZOLOFT) 25 MG tablet Take 1 tablet (25 mg total) by mouth once daily. For depression and anxiety (Patient not taking: Reported on 2022)    VIOS AEROSOL DELIVERY SYSTEM [place] USE Q 4 H PRN     Family History       Problem Relation (Age of Onset)    Cancer Father    Diabetes Father    Heart failure Father, Mother    Stroke Father          Tobacco Use    Smoking status: Former Smoker     Packs/day: 1.00     Years: 40.00     Pack years: 40.00     Quit date: 2013     Years since quittin.5    Smokeless tobacco: Never Used   Substance and Sexual Activity    Alcohol use: Yes     Alcohol/week: 6.0 standard drinks     Types: 6 Cans of beer per week     Comment: seldom    Drug use: No    Sexual activity: Never     Review of Systems   Constitutional:  Negative for fatigue and fever.   HENT:  Negative for sinus pressure.    Eyes:  Negative for visual disturbance.   Respiratory:  Negative for shortness of breath.    Cardiovascular:  Negative for chest pain.   Gastrointestinal:  Negative for vomiting.   Genitourinary:  Negative for difficulty urinating.   Musculoskeletal:  Positive for arthralgias, back pain and gait problem.   Skin:  Negative for rash.   Neurological:  Negative for headaches.   Psychiatric/Behavioral:  Negative for confusion.    Objective:     Vital Signs (Most Recent):  Temp: 97.8 °F (36.6 °C) (22 1641)  Pulse: 68 (22 1641)  Resp: 14 (22 1733)  BP: 136/67 (22 1641)  SpO2: 95 % (22 1641)   Vital Signs (24h Range):  Temp:  [97.5 °F (36.4 °C)-98 °F (36.7 °C)] 97.8 °F (36.6 °C)  Pulse:  [68-73] 68  Resp:  [14-19] 14  SpO2:  [93 %-97 %] 95 %  BP: (136-174)/(66-79) 136/67     Weight: 114.3 kg (251 lb 15.8 oz)  Body mass index is 35.14 kg/m².    Physical  Exam  Constitutional:       General: He is not in acute distress.     Appearance: He is well-developed. He is obese. He is not diaphoretic.   HENT:      Head: Normocephalic and atraumatic.   Eyes:      Pupils: Pupils are equal, round, and reactive to light.   Cardiovascular:      Rate and Rhythm: Normal rate and regular rhythm.      Heart sounds: Normal heart sounds. No murmur heard.    No friction rub. No gallop.   Pulmonary:      Effort: Pulmonary effort is normal. No respiratory distress.      Breath sounds: Normal breath sounds. No stridor. No wheezing or rales.   Abdominal:      General: Bowel sounds are normal. There is no distension.      Palpations: Abdomen is soft. There is no mass.      Tenderness: There is no abdominal tenderness. There is no guarding.   Musculoskeletal:      Comments: Right bka, left transmetatarsal amputation   Skin:     General: Skin is warm.      Findings: No erythema.   Neurological:      Mental Status: He is alert and oriented to person, place, and time.         CRANIAL NERVES     CN III, IV, VI   Pupils are equal, round, and reactive to light.     Significant Labs:   Results for orders placed or performed during the hospital encounter of 08/04/22   CBC auto differential   Result Value Ref Range    WBC 5.41 3.90 - 12.70 K/uL    RBC 4.28 (L) 4.60 - 6.20 M/uL    Hemoglobin 13.0 (L) 14.0 - 18.0 g/dL    Hematocrit 42.6 40.0 - 54.0 %     (H) 82 - 98 fL    MCH 30.4 27.0 - 31.0 pg    MCHC 30.5 (L) 32.0 - 36.0 g/dL    RDW 13.5 11.5 - 14.5 %    Platelets 201 150 - 450 K/uL    MPV 9.2 9.2 - 12.9 fL    Immature Granulocytes 0.4 0.0 - 0.5 %    Gran # (ANC) 3.7 1.8 - 7.7 K/uL    Immature Grans (Abs) 0.02 0.00 - 0.04 K/uL    Lymph # 1.0 1.0 - 4.8 K/uL    Mono # 0.6 0.3 - 1.0 K/uL    Eos # 0.0 0.0 - 0.5 K/uL    Baso # 0.02 0.00 - 0.20 K/uL    nRBC 0 0 /100 WBC    Gran % 69.1 38.0 - 73.0 %    Lymph % 18.1 18.0 - 48.0 %    Mono % 11.8 4.0 - 15.0 %    Eosinophil % 0.2 0.0 - 8.0 %    Basophil %  0.4 0.0 - 1.9 %    Differential Method Automated    Comprehensive metabolic panel   Result Value Ref Range    Sodium 136 136 - 145 mmol/L    Potassium 4.9 3.5 - 5.1 mmol/L    Chloride 102 95 - 110 mmol/L    CO2 24 23 - 29 mmol/L    Glucose 250 (H) 70 - 110 mg/dL    BUN 30 (H) 8 - 23 mg/dL    Creatinine 1.8 (H) 0.5 - 1.4 mg/dL    Calcium 8.4 (L) 8.7 - 10.5 mg/dL    Total Protein 6.7 6.0 - 8.4 g/dL    Albumin 3.0 (L) 3.5 - 5.2 g/dL    Total Bilirubin 0.5 0.1 - 1.0 mg/dL    Alkaline Phosphatase 156 (H) 55 - 135 U/L     (H) 10 - 40 U/L    ALT 73 (H) 10 - 44 U/L    Anion Gap 10 8 - 16 mmol/L    eGFR 40 (A) >60 mL/min/1.73 m^2   COVID-19 Rapid Screening   Result Value Ref Range    SARS-CoV-2 RNA, Amplification, Qual Negative Negative   Hemoglobin A1c if not done in past 3 months   Result Value Ref Range    Hemoglobin A1C 7.4 (H) 4.0 - 5.6 %    Estimated Avg Glucose 166 (H) 68 - 131 mg/dL   CBC Auto Differential   Result Value Ref Range    WBC 4.67 3.90 - 12.70 K/uL    RBC 3.76 (L) 4.60 - 6.20 M/uL    Hemoglobin 11.7 (L) 14.0 - 18.0 g/dL    Hematocrit 37.4 (L) 40.0 - 54.0 %     (H) 82 - 98 fL    MCH 31.1 (H) 27.0 - 31.0 pg    MCHC 31.3 (L) 32.0 - 36.0 g/dL    RDW 13.4 11.5 - 14.5 %    Platelets 155 150 - 450 K/uL    MPV 9.4 9.2 - 12.9 fL    Immature Granulocytes 0.2 0.0 - 0.5 %    Gran # (ANC) 3.0 1.8 - 7.7 K/uL    Immature Grans (Abs) 0.01 0.00 - 0.04 K/uL    Lymph # 1.0 1.0 - 4.8 K/uL    Mono # 0.7 0.3 - 1.0 K/uL    Eos # 0.0 0.0 - 0.5 K/uL    Baso # 0.01 0.00 - 0.20 K/uL    nRBC 0 0 /100 WBC    Gran % 64.5 38.0 - 73.0 %    Lymph % 21.0 18.0 - 48.0 %    Mono % 13.9 4.0 - 15.0 %    Eosinophil % 0.2 0.0 - 8.0 %    Basophil % 0.2 0.0 - 1.9 %    Differential Method Automated    Basic Metabolic Panel   Result Value Ref Range    Sodium 135 (L) 136 - 145 mmol/L    Potassium 4.2 3.5 - 5.1 mmol/L    Chloride 105 95 - 110 mmol/L    CO2 21 (L) 23 - 29 mmol/L    Glucose 159 (H) 70 - 110 mg/dL    BUN 26 (H) 8 - 23 mg/dL     Creatinine 1.5 (H) 0.5 - 1.4 mg/dL    Calcium 7.7 (L) 8.7 - 10.5 mg/dL    Anion Gap 9 8 - 16 mmol/L    eGFR 50 (A) >60 mL/min/1.73 m^2   Magnesium   Result Value Ref Range    Magnesium 1.8 1.6 - 2.6 mg/dL   Tacrolimus level   Result Value Ref Range    Tacrolimus Lvl 2.0 (L) 5.0 - 15.0 ng/mL   Comprehensive Metabolic Panel   Result Value Ref Range    Sodium 135 (L) 136 - 145 mmol/L    Potassium 4.2 3.5 - 5.1 mmol/L    Chloride 105 95 - 110 mmol/L    CO2 23 23 - 29 mmol/L    Glucose 163 (H) 70 - 110 mg/dL    BUN 15 8 - 23 mg/dL    Creatinine 1.1 0.5 - 1.4 mg/dL    Calcium 7.8 (L) 8.7 - 10.5 mg/dL    Total Protein 5.3 (L) 6.0 - 8.4 g/dL    Albumin 2.3 (L) 3.5 - 5.2 g/dL    Total Bilirubin 0.7 0.1 - 1.0 mg/dL    Alkaline Phosphatase 120 55 - 135 U/L    AST 35 10 - 40 U/L    ALT 19 10 - 44 U/L    Anion Gap 7 (L) 8 - 16 mmol/L    eGFR >60 >60 mL/min/1.73 m^2   Tacrolimus level   Result Value Ref Range    Tacrolimus Lvl 6.4 5.0 - 15.0 ng/mL   Renal Function Panel   Result Value Ref Range    Glucose 146 (H) 70 - 110 mg/dL    Sodium 135 (L) 136 - 145 mmol/L    Potassium 4.2 3.5 - 5.1 mmol/L    Chloride 104 95 - 110 mmol/L    CO2 23 23 - 29 mmol/L    BUN 16 8 - 23 mg/dL    Calcium 7.9 (L) 8.7 - 10.5 mg/dL    Creatinine 1.2 0.5 - 1.4 mg/dL    Albumin 2.3 (L) 3.5 - 5.2 g/dL    Phosphorus 2.2 (L) 2.7 - 4.5 mg/dL    eGFR >60 >60 mL/min/1.73 m^2    Anion Gap 8 8 - 16 mmol/L   Magnesium   Result Value Ref Range    Magnesium 2.0 1.6 - 2.6 mg/dL   Echo Saline Bubble? No   Result Value Ref Range    BSA 2.27 m2    TDI SEPTAL 0.09 m/s    LV LATERAL E/E' RATIO 8.45 m/s    LV SEPTAL E/E' RATIO 10.33 m/s    LA WIDTH 3.60 cm    IVC diameter 1.30 cm    Left Ventricular Outflow Tract Mean Velocity 0.38967103322 cm/s    Left Ventricular Outflow Tract Mean Gradient 2.13 mmHg    TDI LATERAL 0.11 m/s    LVIDd 3.72 3.5 - 6.0 cm    IVS 1.59 (A) 0.6 - 1.1 cm    Posterior Wall 1.78 (A) 0.6 - 1.1 cm    Ao root annulus 3.36 cm    LVIDs 2.45 2.1 -  4.0 cm    FS 34 28 - 44 %    LA volume 57.29 cm3    Sinus 3.49 cm    STJ 3.49 cm    Ascending aorta 3.28 cm    LV mass 254.30 g    LA size 3.62 cm    Left Ventricle Relative Wall Thickness 0.96 cm    AV mean gradient 3 mmHg    AV valve area 2.80 cm2    AV Velocity Ratio 0.75     AV index (prosthetic) 0.75     MV valve area p 1/2 method 3.53 cm2    E/A ratio 0.81     Mean e' 0.10 m/s    E wave deceleration time [id number] msec    IVRT 82.276484387 msec    LVOT diameter 2.18 cm    LVOT area 3.7 cm2    LVOT peak honorio 0.84 m/s    LVOT peak VTI 18.60 cm    Ao peak honorio 1.12 m/s    Ao VTI 24.8 cm    RVOT peak honorio 0.91 m/s    RVOT peak VTI 18.1 cm    LVOT stroke volume 69.39 cm3    AV peak gradient 5 mmHg    PV mean gradient 2.63 mmHg    E/E' ratio 9.30 m/s    MV Peak E Honorio 0.93 m/s    TR Max Honorio 2.53 m/s    MV stenosis pressure 1/2 time [id number] ms    MV Peak A Honorio 1.15 m/s    LV Systolic Volume 21.24 mL    LV Systolic Volume Index 9.6 mL/m2    LV Diastolic Volume 58.87 mL    LV Diastolic Volume Index 26.52 mL/m2    LA Volume Index 25.8 mL/m2    LV Mass Index 115 g/m2    RA Major Axis 4.42 cm    Left Atrium Minor Axis 4.79 cm    Left Atrium Major Axis 5.62 cm    Triscuspid Valve Regurgitation Peak Gradient 26 mmHg    RA Width 2.57 cm    Right Atrial Pressure (from IVC) 3 mmHg    EF 60 %    TV rest pulmonary artery pressure 29 mmHg   POCT glucose   Result Value Ref Range    POCT Glucose 263 (H) 70 - 110 mg/dL   POCT glucose   Result Value Ref Range    POCT Glucose 264 (H) 70 - 110 mg/dL   POCT glucose   Result Value Ref Range    POCT Glucose 192 (H) 70 - 110 mg/dL   POCT glucose   Result Value Ref Range    POCT Glucose 210 (H) 70 - 110 mg/dL   POCT glucose   Result Value Ref Range    POCT Glucose 157 (H) 70 - 110 mg/dL   POCT glucose   Result Value Ref Range    POCT Glucose 178 (H) 70 - 110 mg/dL   POCT glucose   Result Value Ref Range    POCT Glucose 193 (H) 70 - 110 mg/dL   POCT glucose   Result Value  Ref Range    POCT Glucose 189 (H) 70 - 110 mg/dL   POCT glucose   Result Value Ref Range    POCT Glucose 183 (H) 70 - 110 mg/dL   POCT glucose   Result Value Ref Range    POCT Glucose 162 (H) 70 - 110 mg/dL   POCT glucose   Result Value Ref Range    POCT Glucose 160 (H) 70 - 110 mg/dL   POCT glucose   Result Value Ref Range    POCT Glucose 152 (H) 70 - 110 mg/dL   POCT glucose   Result Value Ref Range    POCT Glucose 143 (H) 70 - 110 mg/dL   POCT glucose   Result Value Ref Range    POCT Glucose 163 (H) 70 - 110 mg/dL   POCT glucose   Result Value Ref Range    POCT Glucose 166 (H) 70 - 110 mg/dL     *Note: Due to a large number of results and/or encounters for the requested time period, some results have not been displayed. A complete set of results can be found in Results Review.        Significant Imaging: X-Ray Ankle Complete Left  Narrative: EXAMINATION:  XR ANKLE COMPLETE 3 VIEW LEFT    CLINICAL HISTORY:  intraop;    COMPARISON:  None  Impression: FINDINGS/  Intraoperative fluoroscopic images were obtained.    Total fluoro time was 1.4 minute and 2 fluoroscopic images were obtained.  See operative report.    Electronically signed by: Ryan Joya MD  Date:    08/04/2022  Time:    15:55  SURG [place] Surgery Fluoro Usage  See OP Notes for results.     IMPRESSION: See OP Notes for results.     This procedure was auto-finalized by: Virtual Radiologist  Echo Saline Bubble? No  · The left ventricle is normal in size with moderate concentric   hypertrophy and normal systolic function.  · The estimated ejection fraction is 60%.  · Normal left ventricular diastolic function.  · Normal right ventricular size with normal right ventricular systolic   function.  · Mild tricuspid regurgitation.  · Normal central venous pressure (3 mmHg).  · The estimated PA systolic pressure is 29 mmHg.     X-Ray Chest AP Portable  Narrative: EXAMINATION:  XR CHEST AP PORTABLE    CLINICAL HISTORY:  Encounter for other preprocedural  examination    FINDINGS:  Single view of the chest.  08/15/2021 comparison    Cardiac silhouette is normal.  Aorta demonstrates atherosclerotic disease. The lungs demonstrate no evidence of active disease.  Mild bibasilar atelectasis.  No evidence of pleural effusion or pneumothorax.  Bones appear intact demonstrate scattered degenerative change.  Impression: No acute process seen.    Electronically signed by: Ryna Joya MD  Date:    08/04/2022  Time:    07:14  X-Ray Tibia Fibula 2 View Left  Narrative: EXAMINATION:  XR TIBIA FIBULA 2 VIEW LEFT; XR ANKLE COMPLETE 3 VIEW LEFT    CLINICAL HISTORY:  XR TIBIA FIBULA 2 VIEW LEFT; XR ANKLE COMPLETE 3 VIEW LEFTUnspecified fall, initial encounter    COMPARISON:  None    FINDINGS:  Four views of the left tibia/fibula and three views of the left ankle were obtained.    Comminuted tibial metaphyseal fracture with angulation and mild displacement.  There is extension to the medial articular surface and involvement of the interosseous membrane between the tibia and fibula.  Transverse fracture of the distal fibula above the lateral malleolus with mild displacement and angulation.  Mildly comminuted proximal fibular fracture.  Moderate joint effusion of the ankle.  Small avulsion injury at the tip of the medial malleolus    Diffuse osteopenia and moderate soft tissue swelling.  Dense vascular calcifications.  Moderate degenerative changes of the ankle and knee joints.  Advanced degenerative changes within the midfoot.  Large plantar calcaneal spur.  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  Impression: See above.    Electronically signed by: Ryan Joya MD  Date:    08/04/2022  Time:    06:59  X-Ray Ankle Complete Left  Narrative: EXAMINATION:  XR TIBIA FIBULA 2 VIEW LEFT; XR ANKLE COMPLETE 3 VIEW LEFT    CLINICAL HISTORY:  XR TIBIA FIBULA 2 VIEW LEFT; XR ANKLE COMPLETE 3 VIEW LEFTUnspecified fall, initial  encounter    COMPARISON:  None    FINDINGS:  Four views of the left tibia/fibula and three views of the left ankle were obtained.    Comminuted tibial metaphyseal fracture with angulation and mild displacement.  There is extension to the medial articular surface and involvement of the interosseous membrane between the tibia and fibula.  Transverse fracture of the distal fibula above the lateral malleolus with mild displacement and angulation.  Mildly comminuted proximal fibular fracture.  Moderate joint effusion of the ankle.  Small avulsion injury at the tip of the medial malleolus    Diffuse osteopenia and moderate soft tissue swelling.  Dense vascular calcifications.  Moderate degenerative changes of the ankle and knee joints.  Advanced degenerative changes within the midfoot.  Large plantar calcaneal spur.  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  Impression: See above.    Electronically signed by: Ryan Joya MD  Date:    08/04/2022  Time:    06:59

## 2022-08-07 NOTE — PLAN OF CARE
VSS. Fall precautions maintained.   Pain is well controlled.   Left buttock dressing intact and clean.   External fixator intact on left leg. Dressing changed-tolerated well.  Blood sugar monitored. Covered per SS as ordered.  Repositions self.  All needs met. Will continue to monitor.

## 2022-08-07 NOTE — CONSULTS
Pt selected [facility]. Pt was in too much pain to signed choice form. KM,MSW   · Continue bowel regimen  · The patient received soapsuds enema yesterday and had a bowel movement afterwards    · Monitor for bowel movements daily

## 2022-08-07 NOTE — DISCHARGE INSTRUCTIONS
You should applying the enzymatic debride her/Santyl once a day and cover with a dressing.    You will need to follow up in surgery Clinic in about 1 month      Our office phone numbers are  [phone] and

## 2022-08-08 NOTE — SUBJECTIVE & OBJECTIVE
Past Medical History:   Diagnosis Date    DINORAH (acute kidney injury) 2016    Arthritis     CAD in native artery 2019    CHF (congestive heart failure)     Chronic obstructive pulmonary disease 2016    Coronary artery disease involving native coronary artery of native heart without angina pectoris 2016    CRI (chronic renal insufficiency) 2019     donor kidney transplant for DM 16     Induction with Thymo x3 and IV solumedrol to total 875mg  Kidney Biopsy  2016: 16 glomeruli, ACR type 1 AVR type 2, significant microcirculatory changes, c4d negative, No DSA, 5 to10% fibrosis. Treated with thymo x8 2016- no rejection      Diastolic heart failure     Encounter for blood transfusion     ESRD on RRT since 10/2013 10/29/2013    Biopsy proven diabetic nephropathy and lymphoplasmacytic interstitial infiltrate not c/w with AIN (ddx sjogrens or assoc with tamm-horsefall protein extravasation)     GERD (gastroesophageal reflux disease)     History of hepatitis C, s/p successful Rx w/ SVR12 - 2017    Completed 12 weeks harvoni w/ SVR    Hyperlipidemia     Hypertension     PAD (peripheral artery disease) 2019    PIC line (peripherally inserted central catheter) flush     Prophylactic immunotherapy     Proteinuria     PVD (peripheral vascular disease) 2017    RLE BKA CT 16 Extensive atherosclerotic disease of the aorta and mesenteric arteries.     Renal hypertension     Type 2 diabetes mellitus with diabetic neuropathy, with long-term current use of insulin 2016    Vitamin B12 deficiency        Past Surgical History:   Procedure Laterality Date    AORTOGRAPHY WITH SERIALOGRAPHY N/A 2018    Procedure: LEFT LEG ANGIOGRAM;  Surgeon: Donal Mcdonald MD;  Location: [facility] CATH LAB;  Service: Vascular;  Laterality: N/A;    APPLICATION OF LARGE EXTERNAL FIXATION DEVICE TO TIBIA Left 2022    Procedure: APPLICATION, EXTERNAL FIXATION DEVICE, LARGE, TIBIA;   Surgeon: Good Villa MD;  Location: [facility] OR;  Service: Orthopedics;  Laterality: Left;    av bovine graft      Left UE    AV FISTULA PLACEMENT      left UE    CARDIAC CATHETERIZATION  02/2015    CLOSED REDUCTION OF INJURY OF TIBIA Left 8/4/2022    Procedure: CLOSED REDUCTION, TIBIA;  Surgeon: Good Villa MD;  Location: [facility] OR;  Service: Orthopedics;  Laterality: Left;  Closed reduction left tibial and fibular shaft fractures    CLOSURE OF WOUND Left 9/24/2018    Procedure: CLOSURE, WOUND;  Surgeon: Karla Wheeler DPM;  Location: [facility] OR;  Service: Podiatry;  Laterality: Left;  Secondary Wound closure, extensive    CLOSURE OF WOUND Left 11/5/2018    Procedure: CLOSURE, WOUND;  Surgeon: Karla Wheeler DPM;  Location: [facility] OR;  Service: Podiatry;  Laterality: Left;    DEBRIDEMENT OF MULTIPLE METATARSAL BONES Left 11/5/2018    Procedure: DEBRIDEMENT, METATARSAL BONE, 2 OR MORE BONES;  Surgeon: Karla Wheeler DPM;  Location: [facility] OR;  Service: Podiatry;  Laterality: Left;    EXCISION OF SKIN Left 9/27/2019    Procedure: EXCISION, SKIN;  Surgeon: Lenard Alarcon MD;  Location: [address];  Service: Plastics;  Laterality: Left;  Plastics set, NIMS monitor, ACell    FOOT AMPUTATION THROUGH METATARSAL Left 9/21/2018    Procedure: AMPUTATION, FOOT, TRANSMETATARSAL;  Surgeon: Karla Wheeler DPM;  Location: [facility] OR;  Service: Podiatry;  Laterality: Left;    FOOT AMPUTATION THROUGH METATARSAL Left 10/31/2018    Procedure: AMPUTATION, FOOT, TRANSMETATARSAL;  Surgeon: Karla Wheeler DPM;  Location: [facility] OR;  Service: Podiatry;  Laterality: Left;    FOOT AMPUTATION THROUGH METATARSAL Left 11/5/2018    Procedure: AMPUTATION, FOOT, TRANSMETATARSAL;  Surgeon: Karla Wheeler DPM;  Location: [facility] OR;  Service: Podiatry;  Laterality: Left;  revisional transmetatarsal amputation, Left foot    IRRIGATION AND DEBRIDEMENT OF LOWER EXTREMITY Left 10/31/2018    Procedure: IRRIGATION AND  DEBRIDEMENT, LOWER EXTREMITY;  Surgeon: Karla Wheeler DPM;  Location: [facility] OR;  Service: Podiatry;  Laterality: Left;    KIDNEY TRANSPLANT  05/21/2016    LEFT HEART CATHETERIZATION Left 7/21/2019    Procedure: CATHETERIZATION, HEART, LEFT;  Surgeon: Andrew Valdez MD;  Location: [facility] CATH LAB;  Service: Cardiology;  Laterality: Left;    LEG AMPUTATION THROUGH KNEE  2011    right LE, started as nail puncture leading to diabetic ulcer    SKIN FULL THICKNESS GRAFT Left 10/7/2019    Procedure: APPLICATION, GRAFT, SKIN, FULL-THICKNESS;  Surgeon: Lenard Alarcon MD;  Location: [address];  Service: Plastics;  Laterality: Left;    SURGICAL REMOVAL OF LESION OF FACE Right 10/7/2019    Procedure: EXCISION, LESION, FACE;  Surgeon: Lenard Alarcon MD;  Location: [facility] OR [address] [place] [address];  Service: Plastics;  Laterality: Right;       Review of patient's allergies indicates:  No Known Allergies    No current facility-administered medications on file prior to encounter.     Current Outpatient Medications on File Prior to Encounter   Medication Sig    amLODIPine (NORVASC) 10 MG tablet Take 1 tablet (10 mg total) by mouth once daily.    aspirin (ECOTRIN) 81 MG EC tablet Take 1 tablet (81 mg total) by mouth once daily.    atorvastatin (LIPITOR) 80 MG tablet Take 1 tablet (80 mg total) by mouth once daily.    clopidogreL (PLAVIX) 75 mg tablet Take 1 tablet (75 mg total) by mouth once daily.    ergocalciferol (ERGOCALCIFEROL) 50,000 unit Cap Take 1 capsule (50,000 Units total) by mouth every 7 days. Take on Mondays    furosemide (LASIX) 40 MG tablet Take 1 tablet (40 mg total) by mouth daily as needed (Leg swelling, Nocturnal SOB).    gabapentin (NEURONTIN) 300 MG capsule Take 300 mg by mouth 3 (three) times daily.    HYDROcodone-acetaminophen (NORCO)  mg per tablet 1 tablet by Per G Tube route every 6 (six) hours as needed for Pain.    hydrocortisone 2.5 % cream Apply topically 2 (two) times daily.    insulin aspart U-100  (NOVOLOG) 100 unit/mL (3 mL) InPn pen Inject 5 units three times a day with meal if BG > 300.    levalbuterol (XOPENEX) 1.25 mg/3 mL nebulizer solution Take 3 mLs (1.25 mg total) by nebulization every 6 to 8 hours as needed for Wheezing or Shortness of Breath.    LIDOcaine (LIDODERM) 5 % Place 1 patch onto the skin daily as needed. 12 hours on and 12 hours off    metoprolol tartrate (LOPRESSOR) 25 MG tablet Take 1 tablet (25 mg total) by mouth 2 (two) times daily.    mycophenolate (CELLCEPT) 250 mg Cap Take 1 capsule (250 mg total) by mouth 2 (two) times daily.    nitroGLYCERIN (NITROSTAT) 0.4 MG SL tablet Place 1 tablet (0.4 mg total) under the tongue every 5 (five) minutes as needed.    ondansetron (ZOFRAN-ODT) 4 MG TbDL Take 1 tablet (4 mg total) by mouth every 8 (eight) hours as needed.    sevelamer carbonate (RENVELA) 800 mg Tab Take 800 mg by mouth 3 (three) times daily with meals.    tacrolimus (PROGRAF) 0.5 MG Cap Take 2 capsules (1 mg total) by mouth every 12 (twelve) hours.    cloNIDine (CATAPRES) 0.1 MG tablet Take 1 tablet (0.1 mg total) by mouth 3 (three) times daily. (Patient not taking: Reported on 8/4/2022)    flash glucose scanning reader (FREESTYLE BRYAN 14 DAY READER) Misc 1 Device by Misc.(Non-Drug; Combo Route) route as needed.    flash glucose sensor (FREESTYLE BRYAN 14 DAY SENSOR) Kit 1 kit by Misc.(Non-Drug; Combo Route) route every 14 (fourteen) days.    ipratropium (ATROVENT) 0.02 % nebulizer solution Take 2.5 mLs (500 mcg total) by nebulization 4 (four) times daily. (Patient not taking: Reported on 8/4/2022)    isosorbide mononitrate (IMDUR) 30 MG 24 hr tablet Take 2 tablets (60 mg total) by mouth once daily. (Patient not taking: Reported on 8/4/2022)    ketoconazole (NIZORAL) 2 % cream Apply topically 2 (two) times daily.    linaCLOtide (LINZESS) 72 mcg Cap capsule Take 1 capsule (72 mcg total) by mouth before breakfast.    naloxone (NARCAN) 4 mg/actuation Spry 1 spray by Nasal route  once.    semaglutide (OZEMPIC) 1 mg/dose (2 mg/1.5 mL) PnIj Inject 1 mg under the skin every 7 days. (Patient taking differently: ())    sertraline (ZOLOFT) 25 MG tablet Take 1 tablet (25 mg total) by mouth once daily. For depression and anxiety (Patient not taking: Reported on 2022)    VIOS AEROSOL DELIVERY SYSTEM [place] USE Q 4 H PRN     Family History       Problem Relation (Age of Onset)    Cancer Father    Diabetes Father    Heart failure Father, Mother    Stroke Father          Tobacco Use    Smoking status: Former Smoker     Packs/day: 1.00     Years: 40.00     Pack years: 40.00     Quit date: 2013     Years since quittin.5    Smokeless tobacco: Never Used   Substance and Sexual Activity    Alcohol use: Yes     Alcohol/week: 6.0 standard drinks     Types: 6 Cans of beer per week     Comment: seldom    Drug use: No    Sexual activity: Never     Review of Systems   Constitutional:  Negative for fatigue and fever.   HENT:  Negative for sinus pressure.    Eyes:  Negative for visual disturbance.   Respiratory:  Negative for shortness of breath.    Cardiovascular:  Negative for chest pain.   Gastrointestinal:  Negative for vomiting.   Genitourinary:  Negative for difficulty urinating.   Musculoskeletal:  Positive for arthralgias, back pain and gait problem.   Skin:  Negative for rash.   Neurological:  Negative for headaches.   Psychiatric/Behavioral:  Negative for confusion.    Objective:     Vital Signs (Most Recent):  Temp: 97.8 °F (36.6 °C) (22 1311)  Pulse: 71 (22 1328)  Resp: 18 (22 1311)  BP: 135/78 (22 1328)  SpO2: 96 % (22 1311)   Vital Signs (24h Range):  Temp:  [97.4 °F (36.3 °C)-98.1 °F (36.7 °C)] 97.8 °F (36.6 °C)  Pulse:  [68-76] 71  Resp:  [14-22] 18  SpO2:  [94 %-97 %] 96 %  BP: (130-190)/(60-87) 135/78     Weight: 114.7 kg (252 lb 13.9 oz)  Body mass index is 35.27 kg/m².    Physical Exam  Constitutional:       General: He is not in acute distress.      Appearance: He is well-developed. He is obese. He is not diaphoretic.   HENT:      Head: Normocephalic and atraumatic.   Eyes:      Pupils: Pupils are equal, round, and reactive to light.   Cardiovascular:      Rate and Rhythm: Normal rate and regular rhythm.      Heart sounds: Normal heart sounds. No murmur heard.    No friction rub. No gallop.   Pulmonary:      Effort: Pulmonary effort is normal. No respiratory distress.      Breath sounds: Normal breath sounds. No stridor. No wheezing or rales.   Abdominal:      General: Bowel sounds are normal. There is no distension.      Palpations: Abdomen is soft. There is no mass.      Tenderness: There is no abdominal tenderness. There is no guarding.   Musculoskeletal:      Comments: Right bka, left transmetatarsal amputation, external fixator LLE   Skin:     General: Skin is warm.      Findings: No erythema.   Neurological:      Mental Status: He is alert and oriented to person, place, and time.         CRANIAL NERVES     CN III, IV, VI   Pupils are equal, round, and reactive to light.     Significant Labs:   Results for orders placed or performed during the hospital encounter of 08/04/22   CBC auto differential   Result Value Ref Range    WBC 5.41 3.90 - 12.70 K/uL    RBC 4.28 (L) 4.60 - 6.20 M/uL    Hemoglobin 13.0 (L) 14.0 - 18.0 g/dL    Hematocrit 42.6 40.0 - 54.0 %     (H) 82 - 98 fL    MCH 30.4 27.0 - 31.0 pg    MCHC 30.5 (L) 32.0 - 36.0 g/dL    RDW 13.5 11.5 - 14.5 %    Platelets 201 150 - 450 K/uL    MPV 9.2 9.2 - 12.9 fL    Immature Granulocytes 0.4 0.0 - 0.5 %    Gran # (ANC) 3.7 1.8 - 7.7 K/uL    Immature Grans (Abs) 0.02 0.00 - 0.04 K/uL    Lymph # 1.0 1.0 - 4.8 K/uL    Mono # 0.6 0.3 - 1.0 K/uL    Eos # 0.0 0.0 - 0.5 K/uL    Baso # 0.02 0.00 - 0.20 K/uL    nRBC 0 0 /100 WBC    Gran % 69.1 38.0 - 73.0 %    Lymph % 18.1 18.0 - 48.0 %    Mono % 11.8 4.0 - 15.0 %    Eosinophil % 0.2 0.0 - 8.0 %    Basophil % 0.4 0.0 - 1.9 %    Differential Method  Automated    Comprehensive metabolic panel   Result Value Ref Range    Sodium 136 136 - 145 mmol/L    Potassium 4.9 3.5 - 5.1 mmol/L    Chloride 102 95 - 110 mmol/L    CO2 24 23 - 29 mmol/L    Glucose 250 (H) 70 - 110 mg/dL    BUN 30 (H) 8 - 23 mg/dL    Creatinine 1.8 (H) 0.5 - 1.4 mg/dL    Calcium 8.4 (L) 8.7 - 10.5 mg/dL    Total Protein 6.7 6.0 - 8.4 g/dL    Albumin 3.0 (L) 3.5 - 5.2 g/dL    Total Bilirubin 0.5 0.1 - 1.0 mg/dL    Alkaline Phosphatase 156 (H) 55 - 135 U/L     (H) 10 - 40 U/L    ALT 73 (H) 10 - 44 U/L    Anion Gap 10 8 - 16 mmol/L    eGFR 40 (A) >60 mL/min/1.73 m^2   COVID-19 Rapid Screening   Result Value Ref Range    SARS-CoV-2 RNA, Amplification, Qual Negative Negative   Hemoglobin A1c if not done in past 3 months   Result Value Ref Range    Hemoglobin A1C 7.4 (H) 4.0 - 5.6 %    Estimated Avg Glucose 166 (H) 68 - 131 mg/dL   CBC Auto Differential   Result Value Ref Range    WBC 4.67 3.90 - 12.70 K/uL    RBC 3.76 (L) 4.60 - 6.20 M/uL    Hemoglobin 11.7 (L) 14.0 - 18.0 g/dL    Hematocrit 37.4 (L) 40.0 - 54.0 %     (H) 82 - 98 fL    MCH 31.1 (H) 27.0 - 31.0 pg    MCHC 31.3 (L) 32.0 - 36.0 g/dL    RDW 13.4 11.5 - 14.5 %    Platelets 155 150 - 450 K/uL    MPV 9.4 9.2 - 12.9 fL    Immature Granulocytes 0.2 0.0 - 0.5 %    Gran # (ANC) 3.0 1.8 - 7.7 K/uL    Immature Grans (Abs) 0.01 0.00 - 0.04 K/uL    Lymph # 1.0 1.0 - 4.8 K/uL    Mono # 0.7 0.3 - 1.0 K/uL    Eos # 0.0 0.0 - 0.5 K/uL    Baso # 0.01 0.00 - 0.20 K/uL    nRBC 0 0 /100 WBC    Gran % 64.5 38.0 - 73.0 %    Lymph % 21.0 18.0 - 48.0 %    Mono % 13.9 4.0 - 15.0 %    Eosinophil % 0.2 0.0 - 8.0 %    Basophil % 0.2 0.0 - 1.9 %    Differential Method Automated    Basic Metabolic Panel   Result Value Ref Range    Sodium 135 (L) 136 - 145 mmol/L    Potassium 4.2 3.5 - 5.1 mmol/L    Chloride 105 95 - 110 mmol/L    CO2 21 (L) 23 - 29 mmol/L    Glucose 159 (H) 70 - 110 mg/dL    BUN 26 (H) 8 - 23 mg/dL    Creatinine 1.5 (H) 0.5 - 1.4 mg/dL     Calcium 7.7 (L) 8.7 - 10.5 mg/dL    Anion Gap 9 8 - 16 mmol/L    eGFR 50 (A) >60 mL/min/1.73 m^2   Magnesium   Result Value Ref Range    Magnesium 1.8 1.6 - 2.6 mg/dL   Tacrolimus level   Result Value Ref Range    Tacrolimus Lvl 2.0 (L) 5.0 - 15.0 ng/mL   Comprehensive Metabolic Panel   Result Value Ref Range    Sodium 135 (L) 136 - 145 mmol/L    Potassium 4.2 3.5 - 5.1 mmol/L    Chloride 105 95 - 110 mmol/L    CO2 23 23 - 29 mmol/L    Glucose 163 (H) 70 - 110 mg/dL    BUN 15 8 - 23 mg/dL    Creatinine 1.1 0.5 - 1.4 mg/dL    Calcium 7.8 (L) 8.7 - 10.5 mg/dL    Total Protein 5.3 (L) 6.0 - 8.4 g/dL    Albumin 2.3 (L) 3.5 - 5.2 g/dL    Total Bilirubin 0.7 0.1 - 1.0 mg/dL    Alkaline Phosphatase 120 55 - 135 U/L    AST 35 10 - 40 U/L    ALT 19 10 - 44 U/L    Anion Gap 7 (L) 8 - 16 mmol/L    eGFR >60 >60 mL/min/1.73 m^2   Tacrolimus level   Result Value Ref Range    Tacrolimus Lvl 6.4 5.0 - 15.0 ng/mL   Renal Function Panel   Result Value Ref Range    Glucose 146 (H) 70 - 110 mg/dL    Sodium 135 (L) 136 - 145 mmol/L    Potassium 4.2 3.5 - 5.1 mmol/L    Chloride 104 95 - 110 mmol/L    CO2 23 23 - 29 mmol/L    BUN 16 8 - 23 mg/dL    Calcium 7.9 (L) 8.7 - 10.5 mg/dL    Creatinine 1.2 0.5 - 1.4 mg/dL    Albumin 2.3 (L) 3.5 - 5.2 g/dL    Phosphorus 2.2 (L) 2.7 - 4.5 mg/dL    eGFR >60 >60 mL/min/1.73 m^2    Anion Gap 8 8 - 16 mmol/L   Magnesium   Result Value Ref Range    Magnesium 2.0 1.6 - 2.6 mg/dL   Renal Function Panel   Result Value Ref Range    Glucose 119 (H) 70 - 110 mg/dL    Sodium 135 (L) 136 - 145 mmol/L    Potassium 4.2 3.5 - 5.1 mmol/L    Chloride 103 95 - 110 mmol/L    CO2 24 23 - 29 mmol/L    BUN 14 8 - 23 mg/dL    Calcium 8.0 (L) 8.7 - 10.5 mg/dL    Creatinine 1.2 0.5 - 1.4 mg/dL    Albumin 2.3 (L) 3.5 - 5.2 g/dL    Phosphorus 2.2 (L) 2.7 - 4.5 mg/dL    eGFR >60 >60 mL/min/1.73 m^2    Anion Gap 8 8 - 16 mmol/L   Tacrolimus level   Result Value Ref Range    Tacrolimus Lvl 6.6 5.0 - 15.0 ng/mL   Echo Saline  Bubble? No   Result Value Ref Range    BSA 2.27 m2    TDI SEPTAL 0.09 m/s    LV LATERAL E/E' RATIO 8.45 m/s    LV SEPTAL E/E' RATIO 10.33 m/s    LA WIDTH 3.60 cm    IVC diameter 1.30 cm    Left Ventricular Outflow Tract Mean Velocity 0.63716683970 cm/s    Left Ventricular Outflow Tract Mean Gradient 2.13 mmHg    TDI LATERAL 0.11 m/s    LVIDd 3.72 3.5 - 6.0 cm    IVS 1.59 (A) 0.6 - 1.1 cm    Posterior Wall 1.78 (A) 0.6 - 1.1 cm    Ao root annulus 3.36 cm    LVIDs 2.45 2.1 - 4.0 cm    FS 34 28 - 44 %    LA volume 57.29 cm3    Sinus 3.49 cm    STJ 3.49 cm    Ascending aorta 3.28 cm    LV mass 254.30 g    LA size 3.62 cm    Left Ventricle Relative Wall Thickness 0.96 cm    AV mean gradient 3 mmHg    AV valve area 2.80 cm2    AV Velocity Ratio 0.75     AV index (prosthetic) 0.75     MV valve area p 1/2 method 3.53 cm2    E/A ratio 0.81     Mean e' 0.10 m/s    E wave deceleration time [id number] msec    IVRT 82.689490113 msec    LVOT diameter 2.18 cm    LVOT area 3.7 cm2    LVOT peak honorio 0.84 m/s    LVOT peak VTI 18.60 cm    Ao peak honorio 1.12 m/s    Ao VTI 24.8 cm    RVOT peak honorio 0.91 m/s    RVOT peak VTI 18.1 cm    LVOT stroke volume 69.39 cm3    AV peak gradient 5 mmHg    PV mean gradient 2.63 mmHg    E/E' ratio 9.30 m/s    MV Peak E Honorio 0.93 m/s    TR Max Honorio 2.53 m/s    MV stenosis pressure 1/2 time [id number] ms    MV Peak A Honorio 1.15 m/s    LV Systolic Volume 21.24 mL    LV Systolic Volume Index 9.6 mL/m2    LV Diastolic Volume 58.87 mL    LV Diastolic Volume Index 26.52 mL/m2    LA Volume Index 25.8 mL/m2    LV Mass Index 115 g/m2    RA Major Axis 4.42 cm    Left Atrium Minor Axis 4.79 cm    Left Atrium Major Axis 5.62 cm    Triscuspid Valve Regurgitation Peak Gradient 26 mmHg    RA Width 2.57 cm    Right Atrial Pressure (from IVC) 3 mmHg    EF 60 %    TV rest pulmonary artery pressure 29 mmHg   POCT glucose   Result Value Ref Range    POCT Glucose 263 (H) 70 - 110 mg/dL   POCT glucose   Result  Value Ref Range    POCT Glucose 264 (H) 70 - 110 mg/dL   POCT glucose   Result Value Ref Range    POCT Glucose 192 (H) 70 - 110 mg/dL   POCT glucose   Result Value Ref Range    POCT Glucose 210 (H) 70 - 110 mg/dL   POCT glucose   Result Value Ref Range    POCT Glucose 157 (H) 70 - 110 mg/dL   POCT glucose   Result Value Ref Range    POCT Glucose 178 (H) 70 - 110 mg/dL   POCT glucose   Result Value Ref Range    POCT Glucose 193 (H) 70 - 110 mg/dL   POCT glucose   Result Value Ref Range    POCT Glucose 189 (H) 70 - 110 mg/dL   POCT glucose   Result Value Ref Range    POCT Glucose 183 (H) 70 - 110 mg/dL   POCT glucose   Result Value Ref Range    POCT Glucose 162 (H) 70 - 110 mg/dL   POCT glucose   Result Value Ref Range    POCT Glucose 160 (H) 70 - 110 mg/dL   POCT glucose   Result Value Ref Range    POCT Glucose 152 (H) 70 - 110 mg/dL   POCT glucose   Result Value Ref Range    POCT Glucose 143 (H) 70 - 110 mg/dL   POCT glucose   Result Value Ref Range    POCT Glucose 163 (H) 70 - 110 mg/dL   POCT glucose   Result Value Ref Range    POCT Glucose 166 (H) 70 - 110 mg/dL   POCT glucose   Result Value Ref Range    POCT Glucose 150 (H) 70 - 110 mg/dL   POCT glucose   Result Value Ref Range    POCT Glucose 123 (H) 70 - 110 mg/dL   POCT glucose   Result Value Ref Range    POCT Glucose 165 (H) 70 - 110 mg/dL     *Note: Due to a large number of results and/or encounters for the requested time period, some results have not been displayed. A complete set of results can be found in Results Review.        Significant Imaging: X-Ray Ankle Complete Left  Narrative: EXAMINATION:  XR ANKLE COMPLETE 3 VIEW LEFT    CLINICAL HISTORY:  intraop;    COMPARISON:  None  Impression: FINDINGS/  Intraoperative fluoroscopic images were obtained.    Total fluoro time was 1.4 minute and 2 fluoroscopic images were obtained.  See operative report.    Electronically signed by: Ryan Joya MD  Date:    08/04/2022  Time:    15:55  SURG [place] Surgery  Fluoro Usage  See OP Notes for results.     IMPRESSION: See OP Notes for results.     This procedure was auto-finalized by: Virtual Radiologist  Echo Saline Bubble? No  · The left ventricle is normal in size with moderate concentric   hypertrophy and normal systolic function.  · The estimated ejection fraction is 60%.  · Normal left ventricular diastolic function.  · Normal right ventricular size with normal right ventricular systolic   function.  · Mild tricuspid regurgitation.  · Normal central venous pressure (3 mmHg).  · The estimated PA systolic pressure is 29 mmHg.     X-Ray Chest AP Portable  Narrative: EXAMINATION:  XR CHEST AP PORTABLE    CLINICAL HISTORY:  Encounter for other preprocedural examination    FINDINGS:  Single view of the chest.  08/15/2021 comparison    Cardiac silhouette is normal.  Aorta demonstrates atherosclerotic disease. The lungs demonstrate no evidence of active disease.  Mild bibasilar atelectasis.  No evidence of pleural effusion or pneumothorax.  Bones appear intact demonstrate scattered degenerative change.  Impression: No acute process seen.    Electronically signed by: Ryan Joya MD  Date:    08/04/2022  Time:    07:14  X-Ray Tibia Fibula 2 View Left  Narrative: EXAMINATION:  XR TIBIA FIBULA 2 VIEW LEFT; XR ANKLE COMPLETE 3 VIEW LEFT    CLINICAL HISTORY:  XR TIBIA FIBULA 2 VIEW LEFT; XR ANKLE COMPLETE 3 VIEW LEFTUnspecified fall, initial encounter    COMPARISON:  None    FINDINGS:  Four views of the left tibia/fibula and three views of the left ankle were obtained.    Comminuted tibial metaphyseal fracture with angulation and mild displacement.  There is extension to the medial articular surface and involvement of the interosseous membrane between the tibia and fibula.  Transverse fracture of the distal fibula above the lateral malleolus with mild displacement and angulation.  Mildly comminuted proximal fibular fracture.  Moderate joint effusion of the ankle.  Small avulsion  injury at the tip of the medial malleolus    Diffuse osteopenia and moderate soft tissue swelling.  Dense vascular calcifications.  Moderate degenerative changes of the ankle and knee joints.  Advanced degenerative changes within the midfoot.  Large plantar calcaneal spur.  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  Impression: See above.    Electronically signed by: Ryan Joya MD  Date:    08/04/2022  Time:    06:59  X-Ray Ankle Complete Left  Narrative: EXAMINATION:  XR TIBIA FIBULA 2 VIEW LEFT; XR ANKLE COMPLETE 3 VIEW LEFT    CLINICAL HISTORY:  XR TIBIA FIBULA 2 VIEW LEFT; XR ANKLE COMPLETE 3 VIEW LEFTUnspecified fall, initial encounter    COMPARISON:  None    FINDINGS:  Four views of the left tibia/fibula and three views of the left ankle were obtained.    Comminuted tibial metaphyseal fracture with angulation and mild displacement.  There is extension to the medial articular surface and involvement of the interosseous membrane between the tibia and fibula.  Transverse fracture of the distal fibula above the lateral malleolus with mild displacement and angulation.  Mildly comminuted proximal fibular fracture.  Moderate joint effusion of the ankle.  Small avulsion injury at the tip of the medial malleolus    Diffuse osteopenia and moderate soft tissue swelling.  Dense vascular calcifications.  Moderate degenerative changes of the ankle and knee joints.  Advanced degenerative changes within the midfoot.  Large plantar calcaneal spur.  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  Impression: See above.    Electronically signed by: Ryan Joya MD  Date:    08/04/2022  Time:    06:59

## 2022-08-08 NOTE — PLAN OF CARE
LOCET called in , PASRR faxed, pending 142.    Update: 142 received, uploaded to [facility].   08/08/2022   3:46PM

## 2022-08-08 NOTE — PLAN OF CARE
Per chart review, previous Cm sent referral to [facility]. Patients insurance only covers 4 SNF facilities. Cm sent additional SNF referrals to the [facility], the [facility], and [place] in the event that [facility] is not able to accept. Pending accepting SNF.

## 2022-08-08 NOTE — PLAN OF CARE
Problem: Adult Inpatient Plan of Care  Goal: Plan of Care Review  Outcome: Ongoing, Progressing  Goal: Patient-Specific Goal (Individualized)  Outcome: Ongoing, Progressing  Goal: Absence of Hospital-Acquired Illness or Injury  Outcome: Ongoing, Progressing  Goal: Optimal Comfort and Wellbeing  Outcome: Ongoing, Progressing  Goal: Readiness for Transition of Care  Outcome: Ongoing, Progressing     Problem: Fluid Imbalance (Pneumonia)  Goal: Fluid Balance  Outcome: Ongoing, Progressing     Problem: Infection (Pneumonia)  Goal: Resolution of Infection Signs and Symptoms  Outcome: Ongoing, Progressing     Problem: Diabetes Comorbidity  Goal: Blood Glucose Level Within Targeted Range  Outcome: Ongoing, Progressing     Problem: Respiratory Compromise (Pneumonia)  Goal: Effective Oxygenation and Ventilation  Outcome: Ongoing, Progressing     Problem: Skin Injury Risk Increased  Goal: Skin Health and Integrity  Outcome: Ongoing, Progressing

## 2022-08-08 NOTE — PLAN OF CARE
"Pt refusing to participate in therapy, pt stating he "just took at oxy" and that he is leaving today for skilled nursing. Pt educated on importance of functional mobility and participation in therapy. When asking pt if he needed anything, pt responded "I want a gun so I can shoot myself." Charge nurse and nurse notified immediately.   "

## 2022-08-08 NOTE — PT/OT/SLP PROGRESS
"Occupational Therapy   Treatment    Name: Lavelle Ladd  MRN: [id number]  Admitting Diagnosis:  Closed fracture of left tibia and fibula  4 Days Post-Op    Recommendations:     Discharge Recommendations: nursing facility, skilled  Discharge Equipment Recommendations:  slide board  Barriers to discharge:  Decreased caregiver support    Assessment:     Lavelle Ladd is a 67 y.o. male with a medical diagnosis of Closed fracture of left tibia and fibula.  He presents with the following performance deficits affecting function are weakness, impaired endurance, impaired self care skills, impaired functional mobility, gait instability, impaired balance, decreased coordination, decreased upper extremity function, decreased lower extremity function, decreased safety awareness, pain, decreased ROM, impaired skin, orthopedic precautions.     Rehab Prognosis:  Fair; patient would benefit from acute skilled OT services to address these deficits and reach maximum level of function.       Plan:     Patient to be seen 2 x/week to address the above listed problems via self-care/home management, therapeutic activities, therapeutic exercises  · Plan of Care Expires: 08/19/22  · Plan of Care Reviewed with: patient    Subjective     Pain/Comfort:  · Pain Rating 1: 0/10    Objective:     Communicated with: nurse Perez and [facility] chart review prior to session.  Patient found left sidelying with bed alarm, external fixator, peripheral IV, telemetry upon OT entry to room.    General Precautions: Standard, fall   Orthopedic Precautions:LLE non weight bearing   Braces: N/A  Respiratory Status: Room air     AMPA 6 Click ADL: 16    Treatment & Education:  Pt refusing to participate in skilled OT services, pt stating he "just took at oxy" and that he is leaving today for skilled nursing. Pt noted to reposition himself in bed independently. Pt educated on importance of mobility throughout the day and participation in therapy for improved " "strength, ROM, and functional independence. When asking pt if he needed anything, pt responded "I want a gun so I can shoot myself." Charge nurse and nurse notified immediately.     Patient left left sidelying with all lines intact, call button in reach and nurse and charge nurse notifiedEducation:      GOALS:   Multidisciplinary Problems     Occupational Therapy Goals        Problem: Occupational Therapy    Goal Priority Disciplines Outcome Interventions   Occupational Therapy Goal     OT, PT/OT Ongoing, Progressing    Description: Goals to be met by: 8/19/2022     Patient will increase functional independence with ADLs by performing:    LE Dressing with Supervision.  Toilet transfer to bedside commode with Moderate Assistance using AD as needed.  Upper extremity exercise program x15 reps per handout, with independence.                     Time Tracking:     OT Date of Treatment: 08/08/22  OT Start Time: 1130  OT Stop Time: 1138  OT Total Time (min): 8 min    Billable Minutes:Therapeutic Activity 8    OT/MIGUEL: OT       Yuli Solomon, OT    8/8/2022    "

## 2022-08-08 NOTE — PROGRESS NOTES
Lavelle Ladd is a 67 y.o. male patient.     Subjective   The patient is 4th postop day from external fixation of left tibial and fibular fractures.  He is sleeping soundly at present.    1. Closed fracture of left tibia and fibula, initial encounter    2. Fall    3. Fracture tibia/fibula, left, closed, initial encounter    4. Preop examination    5. Coronary artery disease    6. Closed fracture of left tibia and fibula with routine healing, subsequent encounter    7. Pressure injury of left hip, stage 2      Past Medical History:   Diagnosis Date    DINORAH (acute kidney injury) 2016    Arthritis     CAD in native artery 2019    CHF (congestive heart failure)     Chronic obstructive pulmonary disease 2016    Coronary artery disease involving native coronary artery of native heart without angina pectoris 2016    CRI (chronic renal insufficiency) 2019     donor kidney transplant for DM 16     Induction with Thymo x3 and IV solumedrol to total 875mg  Kidney Biopsy  2016: 16 glomeruli, ACR type 1 AVR type 2, significant microcirculatory changes, c4d negative, No DSA, 5 to10% fibrosis. Treated with thymo x8 2016- no rejection      Diastolic heart failure     Encounter for blood transfusion     ESRD on RRT since 10/2013 10/29/2013    Biopsy proven diabetic nephropathy and lymphoplasmacytic interstitial infiltrate not c/w with AIN (ddx sjogrens or assoc with tamm-horsefall protein extravasation)     GERD (gastroesophageal reflux disease)     History of hepatitis C, s/p successful Rx w/ SVR12 - 2017    Completed 12 weeks harvoni w/ SVR    Hyperlipidemia     Hypertension     PAD (peripheral artery disease) 2019    PIC line (peripherally inserted central catheter) flush     Prophylactic immunotherapy     Proteinuria     PVD (peripheral vascular disease) 2017    RLE BKA CT 16 Extensive atherosclerotic disease of the aorta and  mesenteric arteries.     Renal hypertension     Type 2 diabetes mellitus with diabetic neuropathy, with long-term current use of insulin 12/1/2016    Vitamin B12 deficiency      Past Surgical History Pertinent Negatives:   Procedure Date Noted    CARDIAC SURGERY 12/08/2015     Scheduled Meds:   amLODIPine  10 mg Oral Daily    atorvastatin  80 mg Oral Daily    chlorhexidine  10 mL Mouth/Throat BID    collagenase   Topical (Top) Daily    fluticasone propionate  2 spray Each Nostril Daily    metoprolol tartrate  25 mg Oral BID    mupirocin   Topical (Top) Daily    sertraline  25 mg Oral Daily    sodium hypochlorite 0.125%   Topical (Top) BID    tacrolimus  1 mg Oral Daily PM    tacrolimus  1.5 mg Oral Daily AM     Continuous Infusions:  PRN Meds:acetaminophen, albuterol-ipratropium, aluminum-magnesium hydroxide-simethicone, benzonatate, dextrose 10%, dextrose 10%, glucagon (human recombinant), guaiFENesin 100 mg/5 ml, hydrALAZINE, insulin aspart U-100, melatonin, morphine, ondansetron, oxyCODONE-acetaminophen, sodium chloride 0.9%, sodium chloride 0.9%, sodium chloride 0.9%    Review of patient's allergies indicates:  No Known Allergies  Active Hospital Problems    Diagnosis  POA    *Closed fracture of left tibia and fibula [S82.202A, S82.402A]  Yes    Pressure injury of left hip, stage 2 [L89.222]  Yes    Severe central sleep apnea comorbid with prescribed opioid use [F11.90, G47.37]  Yes    Kidney transplant recipient [Z94.0]  Not Applicable    Type 2 diabetes mellitus with stable proliferative retinopathy of both eyes, with long-term current use of insulin [E11.3553, Z79.4]  Not Applicable    Coronary artery disease of native artery of native heart with stable angina pectoris [I25.118]  Yes    Osteoarthritis of lumbar spine [M47.816]  Yes    Essential hypertension [I10]  Yes      Resolved Hospital Problems   No resolved problems to display.     Blood pressure (!) 170/77, pulse 71, temperature  "97.6 °F (36.4 °C), temperature source Oral, resp. rate (!) 22, height 5' 11" (1.803 m), weight 114.7 kg (252 lb 13.9 oz), SpO2 97 %.    Objective   External fixation left lower extremity intact.  No evidence of infection around pin sites or the open wound areas.     Assessment & Plan   Continue with occupational physical therapy, continue with wound care measures.  Await rehab transfer.  Will need Ortho Trauma Clinic follow-up in about 3 weeks for x-rays.  Plan to leave the external fixator on for about 6 weeks and then short-leg cast until fracture heals.       8/8/2022        Good Villa MD, FAAOS [facility], Orthopedic Trauma Service  [place]    "

## 2022-08-08 NOTE — CONSULTS
[facility]  Nephrology  Consult Note    Patient Name: Lavelle Ladd  MRN: [id number]  Admission Date: 2022  Hospital Length of Stay: 4 days  Attending Provider: Chapin Proctor MD   Primary Care Physician: Yahir Calzada MD  Principal Problem:Closed fracture of left tibia and fibula    Consults  Subjective:     HPI:  67-year-old male who is admitted after falling and sustaining a lower extremity fracture.  Now status post external fixation.  Has history of kidney transplant.  Nephrology has been consulted for assistance with management of immunosuppression and kidney transplant.  The patient was seen in his hospital room.  In bed resting comfortably.  No acute distress noted.  He underwent kidney transplant in May of 2016. He states that he has not seen a general nephrologist or a transplant nephrologist in almost 2 years.  He states that no one has been following his Prograf levels.  He does not remember his home dose of Prograf.    2022:  Patient was seen in his hospital room.  In bed resting comfortably.  No acute distress noted.  Discussed nephrology related issues with he and his sister.  All nephrology related questions were answered to their satisfaction.    Review of systems:  No shortness of breath or chest discomfort.  No fevers or chills.  No nausea vomiting diarrhea.    Past Medical History:   Diagnosis Date    DINORAH (acute kidney injury) 2016    Arthritis     CAD in native artery 2019    CHF (congestive heart failure)     Chronic obstructive pulmonary disease 2016    Coronary artery disease involving native coronary artery of native heart without angina pectoris 2016    CRI (chronic renal insufficiency) 2019     donor kidney transplant for DM 16     Induction with Thymo x3 and IV solumedrol to total 875mg  Kidney Biopsy  2016: 16 glomeruli, ACR type 1 AVR type 2, significant microcirculatory changes, c4d negative, No DSA, 5 to10%  fibrosis. Treated with thymo x8 6/21/2016- no rejection      Diastolic heart failure     Encounter for blood transfusion     ESRD on RRT since 10/2013 10/29/2013    Biopsy proven diabetic nephropathy and lymphoplasmacytic interstitial infiltrate not c/w with AIN (ddx sjogrens or assoc with tamm-horsefall protein extravasation)     GERD (gastroesophageal reflux disease)     History of hepatitis C, s/p successful Rx w/ SVR12 - 4/2017 4/5/2017    Completed 12 weeks harvoni w/ SVR    Hyperlipidemia     Hypertension     PAD (peripheral artery disease) 7/21/2019    PIC line (peripherally inserted central catheter) flush     Prophylactic immunotherapy     Proteinuria     PVD (peripheral vascular disease) 6/26/2017    RLE BKA CT 12/11/16 Extensive atherosclerotic disease of the aorta and mesenteric arteries.     Renal hypertension     Type 2 diabetes mellitus with diabetic neuropathy, with long-term current use of insulin 12/1/2016    Vitamin B12 deficiency        Past Surgical History:   Procedure Laterality Date    AORTOGRAPHY WITH SERIALOGRAPHY N/A 6/14/2018    Procedure: LEFT LEG ANGIOGRAM;  Surgeon: Donal Mcdonald MD;  Location: [facility] CATH LAB;  Service: Vascular;  Laterality: N/A;    APPLICATION OF LARGE EXTERNAL FIXATION DEVICE TO TIBIA Left 8/4/2022    Procedure: APPLICATION, EXTERNAL FIXATION DEVICE, LARGE, TIBIA;  Surgeon: Good Villa MD;  Location: [facility] OR;  Service: Orthopedics;  Laterality: Left;    av bovine graft      Left UE    AV FISTULA PLACEMENT      left UE    CARDIAC CATHETERIZATION  02/2015    CLOSED REDUCTION OF INJURY OF TIBIA Left 8/4/2022    Procedure: CLOSED REDUCTION, TIBIA;  Surgeon: Good Villa MD;  Location: [facility] OR;  Service: Orthopedics;  Laterality: Left;  Closed reduction left tibial and fibular shaft fractures    CLOSURE OF WOUND Left 9/24/2018    Procedure: CLOSURE, WOUND;  Surgeon: Karla Wheeler DPM;  Location: [facility] OR;  Service: Podiatry;  Laterality:  Left;  Secondary Wound closure, extensive    CLOSURE OF WOUND Left 11/5/2018    Procedure: CLOSURE, WOUND;  Surgeon: Karla Wheeler DPM;  Location: [facility] OR;  Service: Podiatry;  Laterality: Left;    DEBRIDEMENT OF MULTIPLE METATARSAL BONES Left 11/5/2018    Procedure: DEBRIDEMENT, METATARSAL BONE, 2 OR MORE BONES;  Surgeon: Karla Wheeler DPM;  Location: [facility] OR;  Service: Podiatry;  Laterality: Left;    EXCISION OF SKIN Left 9/27/2019    Procedure: EXCISION, SKIN;  Surgeon: Lenard Alarcon MD;  [address]  Service: Plastics;  Laterality: Left;  Plastics set, NIMS monitor, [employer]    FOOT AMPUTATION THROUGH METATARSAL Left 9/21/2018    Procedure: AMPUTATION, FOOT, TRANSMETATARSAL;  Surgeon: Karla Wheeler DPM;  Location: [facility] OR;  Service: Podiatry;  Laterality: Left;    FOOT AMPUTATION THROUGH METATARSAL Left 10/31/2018    Procedure: AMPUTATION, FOOT, TRANSMETATARSAL;  Surgeon: Karla Wheeler DPM;  Location: [facility] OR;  Service: Podiatry;  Laterality: Left;    FOOT AMPUTATION THROUGH METATARSAL Left 11/5/2018    Procedure: AMPUTATION, FOOT, TRANSMETATARSAL;  Surgeon: Karla Wheeler DPM;  Location: [facility] OR;  Service: Podiatry;  Laterality: Left;  revisional transmetatarsal amputation, Left foot    IRRIGATION AND DEBRIDEMENT OF LOWER EXTREMITY Left 10/31/2018    Procedure: IRRIGATION AND DEBRIDEMENT, LOWER EXTREMITY;  Surgeon: Karla Wheeler DPM;  Location: [facility] OR;  Service: Podiatry;  Laterality: Left;    KIDNEY TRANSPLANT  05/21/2016    LEFT HEART CATHETERIZATION Left 7/21/2019    Procedure: CATHETERIZATION, HEART, LEFT;  Surgeon: Andrew Valdez MD;  Location: [facility] CATH LAB;  Service: Cardiology;  Laterality: Left;    LEG AMPUTATION THROUGH KNEE  2011    right LE, started as nail puncture leading to diabetic ulcer    SKIN FULL THICKNESS GRAFT Left 10/7/2019    Procedure: APPLICATION, GRAFT, SKIN, FULL-THICKNESS;  Surgeon: Lenard Alarcon MD;  Location: [facility] OR  2ND FLR;  Service: Plastics;  Laterality: Left;    SURGICAL REMOVAL OF LESION OF FACE Right 10/7/2019    Procedure: EXCISION, LESION, FACE;  Surgeon: Lenard Alarcon MD;  Location: [facility] OR 2ND FLR;  Service: Plastics;  Laterality: Right;       Review of patient's allergies indicates:  No Known Allergies  Current Facility-Administered Medications   Medication Frequency    acetaminophen tablet 650 mg Q4H PRN    albuterol-ipratropium 2.5 mg-0.5 mg/3 mL nebulizer solution 3 mL Q4H PRN    aluminum-magnesium hydroxide-simethicone 200-200-20 mg/5 mL suspension 30 mL Q6H PRN    amLODIPine tablet 10 mg Daily    atorvastatin tablet 80 mg Daily    benzonatate capsule 100 mg TID PRN    chlorhexidine 0.12 % solution 10 mL BID    collagenase ointment Daily    dextrose 10% bolus 125 mL PRN    dextrose 10% bolus 250 mL PRN    fluticasone propionate 50 mcg/actuation nasal spray 100 mcg Daily    glucagon (human recombinant) injection 1 mg PRN    guaiFENesin 100 mg/5 ml syrup 200 mg Q4H PRN    hydrALAZINE injection 10 mg Q8H PRN    insulin aspart U-100 pen 1-10 Units Q6H PRN    melatonin tablet 6 mg Nightly PRN    metoprolol tartrate (LOPRESSOR) tablet 25 mg BID    morphine injection 2 mg Q2H PRN    mupirocin 2 % ointment Daily    ondansetron disintegrating tablet 8 mg Q8H PRN    oxyCODONE-acetaminophen  mg per tablet 1 tablet Q4H PRN    sertraline tablet 25 mg Daily    sodium chloride 0.9% flush 10 mL PRN    sodium chloride 0.9% flush 10 mL PRN    sodium chloride 0.9% flush 10 mL PRN    sodium hypochlorite 0.125% external solution BID    tacrolimus capsule 1 mg Daily PM    tacrolimus capsule 1.5 mg Daily AM     Family History     Problem Relation (Age of Onset)    Cancer Father    Diabetes Father    Heart failure Father, Mother    Stroke Father        Tobacco Use    Smoking status: Former Smoker     Packs/day: 1.00     Years: 40.00     Pack years: 40.00     Quit date: 1/11/2013     Years since  quittin.5    Smokeless tobacco: Never Used   Substance and Sexual Activity    Alcohol use: Yes     Alcohol/week: 6.0 standard drinks     Types: 6 Cans of beer per week     Comment: seldom    Drug use: No    Sexual activity: Never     Review of Systems   Constitutional: Negative.    HENT: Negative.    Eyes: Negative.    Respiratory: Negative.    Cardiovascular: Negative.    Gastrointestinal: Negative.    Genitourinary: Negative.    Musculoskeletal:        Discomfort in his left lower extremity has surgical site.   Skin: Negative.    Neurological: Negative.    Hematological: Negative.      Objective:     Vital Signs (Most Recent):  Temp: 98 °F (36.7 °C) (22)  Pulse: 76 (22)  Resp: 18 (22)  BP: 130/60 (22)  SpO2: 97 % (22)  O2 Device (Oxygen Therapy): room air (22 0701) Vital Signs (24h Range):  Temp:  [97.4 °F (36.3 °C)-98.1 °F (36.7 °C)] 98 °F (36.7 °C)  Pulse:  [68-76] 76  Resp:  [14-22] 18  SpO2:  [94 %-97 %] 97 %  BP: (130-190)/(60-87) 130/60     Weight: 114.7 kg (252 lb 13.9 oz) (22 0019)  Body mass index is 35.27 kg/m².  Body surface area is 2.4 meters squared.    I/O last 3 completed shifts:  In: 720 [P.O.:720]  Out: 1175 [Urine:1175]    Physical Exam  Constitutional:       Appearance: Normal appearance.   HENT:      Head: Normocephalic and atraumatic.   Eyes:      General: No scleral icterus.     Extraocular Movements: Extraocular movements intact.      Pupils: Pupils are equal, round, and reactive to light.   Pulmonary:      Effort: Pulmonary effort is normal.      Breath sounds: No stridor.   Musculoskeletal:      Comments: External fixation device left lower extremity.   Skin:     General: Skin is warm.   Neurological:      General: No focal deficit present.      Mental Status: He is alert and oriented to person, place, and time.   Psychiatric:         Mood and Affect: Mood normal.         Behavior: Behavior normal.          Significant Labs:  BMP:   Recent Labs   Lab 08/07/22  0515 08/08/22  0501   * 119*   * 135*   K 4.2 4.2    103   CO2 23 24   BUN 16 14   CREATININE 1.2 1.2   CALCIUM 7.9* 8.0*   MG 2.0  --      CMP:   Recent Labs   Lab 08/06/22  1010 08/07/22  0515 08/08/22  0501   *   < > 119*   CALCIUM 7.8*   < > 8.0*   ALBUMIN 2.3*   < > 2.3*   PROT 5.3*  --   --    *   < > 135*   K 4.2   < > 4.2   CO2 23   < > 24      < > 103   BUN 15   < > 14   CREATININE 1.1   < > 1.2   ALKPHOS 120  --   --    ALT 19  --   --    AST 35  --   --    BILITOT 0.7  --   --     < > = values in this interval not displayed.     All labs within the past 24 hours have been reviewed.    Significant Imaging:  Labs: Reviewed  Reviewed    Assessment/Plan:     Active Diagnoses:    Diagnosis Date Noted POA    PRINCIPAL PROBLEM:  Closed fracture of left tibia and fibula [S82.202A, S82.402A] 08/04/2022 Yes    Pressure injury of left hip, stage 2 [L89.222] 08/05/2022 Yes    Severe central sleep apnea comorbid with prescribed opioid use [F11.90, G47.37] 01/29/2020 Yes    Kidney transplant recipient [Z94.0] 04/07/2017 Not Applicable    Type 2 diabetes mellitus with stable proliferative retinopathy of both eyes, with long-term current use of insulin [E11.3553, Z79.4] 12/01/2016 Not Applicable    Coronary artery disease of native artery of native heart with stable angina pectoris [I25.118] 07/01/2016 Yes    Osteoarthritis of lumbar spine [M47.816]  Yes    Essential hypertension [I10] 02/20/2015 Yes      Problems Resolved During this Admission:       1. Kidney transplant status:  Status post kidney transplant in May of 2016.  Cause of renal failure was diabetic nephropathy.  Creatinine remains stable at 1.2.  Continues nonoliguric with over 800 cc urine output reported.    2. Immunosuppression:  Prograf was adjusted to 1.5 mg a.m. 1 mg p.m. yesterday.  Current Prograf level is pending.  Will try to shoot for a  level between 3 and 6 given the vintage of his kidney transplant.      Mycophenolate on hold secondary to lower extremity fracture.    Magnesium was stable 2.0.        Thank you for your consult.     Jose David Hooker MD  Nephrology  [facility]

## 2022-08-08 NOTE — PLAN OF CARE
Problem: Adult Inpatient Plan of Care  Goal: Plan of Care Review  Outcome: Ongoing, Progressing  Goal: Patient-Specific Goal (Individualized)  Outcome: Ongoing, Progressing  Goal: Absence of Hospital-Acquired Illness or Injury  Outcome: Ongoing, Progressing  Goal: Optimal Comfort and Wellbeing  Outcome: Ongoing, Progressing  Goal: Readiness for Transition of Care  Outcome: Ongoing, Progressing     Problem: Fluid Imbalance (Pneumonia)  Goal: Fluid Balance  Outcome: Ongoing, Progressing     Problem: Infection (Pneumonia)  Goal: Resolution of Infection Signs and Symptoms  Outcome: Ongoing, Progressing     Problem: Respiratory Compromise (Pneumonia)  Goal: Effective Oxygenation and Ventilation  Outcome: Ongoing, Progressing     Problem: Diabetes Comorbidity  Goal: Blood Glucose Level Within Targeted Range  Outcome: Ongoing, Progressing     Problem: Skin Injury Risk Increased  Goal: Skin Health and Integrity  Outcome: Ongoing, Progressing     Problem: Impaired Wound Healing  Goal: Optimal Wound Healing  Outcome: Ongoing, Progressing     Problem: Fall Injury Risk  Goal: Absence of Fall and Fall-Related Injury  Outcome: Ongoing, Progressing

## 2022-08-08 NOTE — PROGRESS NOTES
[facility] Medicine  Progress Note    Patient Name: Lavelle Ladd  MRN: [id number]  Patient Class: IP- Inpatient   Admission Date: 8/4/2022  Length of Stay: 4 days  Attending Physician: Chapin Proctor MD  Primary Care Provider: Yahir Calzada MD        Subjective:     Principal Problem:Closed fracture of left tibia and fibula        HPI:  Patient is a 67 y.o.  male with a PMHx of HTN, CAD, DM, PVD, kidney transplant, COPD, right BKA, left transmetatarsal amputation who presents to the Emergency Department for evaluation of a fall which onset suddenly 5 hours ago. Patient states he was trying to get into his wheel chair when he slipped and fell. He reported not being able to put weight on it and laid on the ground for 4-5 hours before calling ems. Patient denied any issues with anesthesia with prior surgeries. Currently complains of pain and nausea. Otherwise denies any other issues.     In the ED, ankle xray showed distal tib fib fracture. Ortho notified and plans to operate later this afternoon. Patient was admitted to .               Overview/Hospital Course:  Pt postop day 1 status post closed reduction left tibial and fibular shaft fractures with application of external fixation device. PT/OT rec SNF placement. General  surgery consulted for eval of L lateral buttock wound, hold apixaban for now.     8/6 POD#2 patient lying in bed resting, c/o lower extremity and back pain. Pt s/p bedside debridement of L lateral buttock wound per general surgery. Resume apixaban. Nephrology consulted for subtherapeutic prograf level. Continue supportive management, awaiting SNF placement.  8/7 POD #3 No acute events overnight. Per surgery patient NWB to LLE. Plan to leave the external fixation device on for approximately 6 weeks and then below-knee casting until fracture heals. Will need f/u  in ortho trauma clinic within 3 weeks. Continue supportive management. Plan SNF placement- patient  selected [facility] for placement.  Anticipate discharge within 24-48 hours.      awaiting SNF placement. Per nephrology continue prograf 1 mg every evening, 1.5 mg every morning pending repeat level.          Past Medical History:   Diagnosis Date    DINORAH (acute kidney injury) 2016    Arthritis     CAD in native artery 2019    CHF (congestive heart failure)     Chronic obstructive pulmonary disease 2016    Coronary artery disease involving native coronary artery of native heart without angina pectoris 2016    CRI (chronic renal insufficiency) 2019     donor kidney transplant for DM 16     Induction with Thymo x3 and IV solumedrol to total 875mg  Kidney Biopsy  2016: 16 glomeruli, ACR type 1 AVR type 2, significant microcirculatory changes, c4d negative, No DSA, 5 to10% fibrosis. Treated with thymo x8 2016- no rejection      Diastolic heart failure     Encounter for blood transfusion     ESRD on RRT since 10/2013 10/29/2013    Biopsy proven diabetic nephropathy and lymphoplasmacytic interstitial infiltrate not c/w with AIN (ddx sjogrens or assoc with tamm-horsefall protein extravasation)     GERD (gastroesophageal reflux disease)     History of hepatitis C, s/p successful Rx w/ SVR12 - 2017    Completed 12 weeks harvoni w/ SVR    Hyperlipidemia     Hypertension     PAD (peripheral artery disease) 2019    PIC line (peripherally inserted central catheter) flush     Prophylactic immunotherapy     Proteinuria     PVD (peripheral vascular disease) 2017    RLE BKA CT 16 Extensive atherosclerotic disease of the aorta and mesenteric arteries.     Renal hypertension     Type 2 diabetes mellitus with diabetic neuropathy, with long-term current use of insulin 2016    Vitamin B12 deficiency        Past Surgical History:   Procedure Laterality Date    AORTOGRAPHY WITH SERIALOGRAPHY N/A 2018     Procedure: LEFT LEG ANGIOGRAM;  Surgeon: Donal Mcdonald MD;  Location: [facility] CATH LAB;  Service: Vascular;  Laterality: N/A;    APPLICATION OF LARGE EXTERNAL FIXATION DEVICE TO TIBIA Left 8/4/2022    Procedure: APPLICATION, EXTERNAL FIXATION DEVICE, LARGE, TIBIA;  Surgeon: Good Villa MD;  Location: [facility] OR;  Service: Orthopedics;  Laterality: Left;    av bovine graft      Left UE    AV FISTULA PLACEMENT      left UE    CARDIAC CATHETERIZATION  02/2015    CLOSED REDUCTION OF INJURY OF TIBIA Left 8/4/2022    Procedure: CLOSED REDUCTION, TIBIA;  Surgeon: Good Villa MD;  Location: [facility] OR;  Service: Orthopedics;  Laterality: Left;  Closed reduction left tibial and fibular shaft fractures    CLOSURE OF WOUND Left 9/24/2018    Procedure: CLOSURE, WOUND;  Surgeon: Karla Wheeler DPM;  Location: [facility];  Service: Podiatry;  Laterality: Left;  Secondary Wound closure, extensive    CLOSURE OF WOUND Left 11/5/2018    Procedure: CLOSURE, WOUND;  Surgeon: Karla Wheeler DPM;  Location: [facility];  Service: Podiatry;  Laterality: Left;    DEBRIDEMENT OF MULTIPLE METATARSAL BONES Left 11/5/2018    Procedure: DEBRIDEMENT, METATARSAL BONE, 2 OR MORE BONES;  Surgeon: Karla Wheeler DPM;  Location: [facility];  Service: Podiatry;  Laterality: Left;    EXCISION OF SKIN Left 9/27/2019    Procedure: EXCISION, SKIN;  Surgeon: Lenard Alarcon MD;  Location: [address];  Service: Plastics;  Laterality: Left;  Plastics set, NIMS monitor, [employer]    FOOT AMPUTATION THROUGH METATARSAL Left 9/21/2018    Procedure: AMPUTATION, FOOT, TRANSMETATARSAL;  Surgeon: Karla Wheeler DPM;  Location: [facility] OR;  Service: Podiatry;  Laterality: Left;    FOOT AMPUTATION THROUGH METATARSAL Left 10/31/2018    Procedure: AMPUTATION, FOOT, TRANSMETATARSAL;  Surgeon: Karla Wheeler DPM;  Location: [facility];  Service: Podiatry;  Laterality: Left;    FOOT AMPUTATION THROUGH METATARSAL Left 11/5/2018    Procedure:  AMPUTATION, FOOT, TRANSMETATARSAL;  Surgeon: Karla Wheeler DPM;  Location: [facility] OR;  Service: Podiatry;  Laterality: Left;  revisional transmetatarsal amputation, Left foot    IRRIGATION AND DEBRIDEMENT OF LOWER EXTREMITY Left 10/31/2018    Procedure: IRRIGATION AND DEBRIDEMENT, LOWER EXTREMITY;  Surgeon: Karla Wheeler DPM;  Location: [facility] OR;  Service: Podiatry;  Laterality: Left;    KIDNEY TRANSPLANT  05/21/2016    LEFT HEART CATHETERIZATION Left 7/21/2019    Procedure: CATHETERIZATION, HEART, LEFT;  Surgeon: Andrew Valdez MD;  Location: [facility] CATH LAB;  Service: Cardiology;  Laterality: Left;    LEG AMPUTATION THROUGH KNEE  2011    right LE, started as nail puncture leading to diabetic ulcer    SKIN FULL THICKNESS GRAFT Left 10/7/2019    Procedure: APPLICATION, GRAFT, SKIN, FULL-THICKNESS;  Surgeon: Lenard Alarcon MD;  Location: [address];  Service: Plastics;  Laterality: Left;    SURGICAL REMOVAL OF LESION OF FACE Right 10/7/2019    Procedure: EXCISION, LESION, FACE;  Surgeon: Lenard Alarcon MD;  Location: [address];  Service: Plastics;  Laterality: Right;       Review of patient's allergies indicates:  No Known Allergies    No current facility-administered medications on file prior to encounter.     Current Outpatient Medications on File Prior to Encounter   Medication Sig    amLODIPine (NORVASC) 10 MG tablet Take 1 tablet (10 mg total) by mouth once daily.    aspirin (ECOTRIN) 81 MG EC tablet Take 1 tablet (81 mg total) by mouth once daily.    atorvastatin (LIPITOR) 80 MG tablet Take 1 tablet (80 mg total) by mouth once daily.    clopidogreL (PLAVIX) 75 mg tablet Take 1 tablet (75 mg total) by mouth once daily.    ergocalciferol (ERGOCALCIFEROL) 50,000 unit Cap Take 1 capsule (50,000 Units total) by mouth every 7 days. Take on Mondays    furosemide (LASIX) 40 MG tablet Take 1 tablet (40 mg total) by mouth daily as needed (Leg swelling, Nocturnal SOB).    gabapentin  (NEURONTIN) 300 MG capsule Take 300 mg by mouth 3 (three) times daily.    HYDROcodone-acetaminophen (NORCO)  mg per tablet 1 tablet by Per G Tube route every 6 (six) hours as needed for Pain.    hydrocortisone 2.5 % cream Apply topically 2 (two) times daily.    insulin aspart U-100 (NOVOLOG) 100 unit/mL (3 mL) InPn pen Inject 5 units three times a day with meal if BG > 300.    levalbuterol (XOPENEX) 1.25 mg/3 mL nebulizer solution Take 3 mLs (1.25 mg total) by nebulization every 6 to 8 hours as needed for Wheezing or Shortness of Breath.    LIDOcaine (LIDODERM) 5 % Place 1 patch onto the skin daily as needed. 12 hours on and 12 hours off    metoprolol tartrate (LOPRESSOR) 25 MG tablet Take 1 tablet (25 mg total) by mouth 2 (two) times daily.    mycophenolate (CELLCEPT) 250 mg Cap Take 1 capsule (250 mg total) by mouth 2 (two) times daily.    nitroGLYCERIN (NITROSTAT) 0.4 MG SL tablet Place 1 tablet (0.4 mg total) under the tongue every 5 (five) minutes as needed.    ondansetron (ZOFRAN-ODT) 4 MG TbDL Take 1 tablet (4 mg total) by mouth every 8 (eight) hours as needed.    sevelamer carbonate (RENVELA) 800 mg Tab Take 800 mg by mouth 3 (three) times daily with meals.    tacrolimus (PROGRAF) 0.5 MG Cap Take 2 capsules (1 mg total) by mouth every 12 (twelve) hours.    cloNIDine (CATAPRES) 0.1 MG tablet Take 1 tablet (0.1 mg total) by mouth 3 (three) times daily. (Patient not taking: Reported on 8/4/2022)    flash glucose scanning reader (FREESTYLE BRYAN 14 DAY READER) Misc 1 Device by Misc.(Non-Drug; Combo Route) route as needed.    flash glucose sensor (FREESTYLE BRYAN 14 DAY SENSOR) Kit 1 kit by Misc.(Non-Drug; Combo Route) route every 14 (fourteen) days.    ipratropium (ATROVENT) 0.02 % nebulizer solution Take 2.5 mLs (500 mcg total) by nebulization 4 (four) times daily. (Patient not taking: Reported on 8/4/2022)    isosorbide mononitrate (IMDUR) 30 MG 24 hr tablet Take 2 tablets (60 mg total)  by mouth once daily. (Patient not taking: Reported on 2022)    ketoconazole (NIZORAL) 2 % cream Apply topically 2 (two) times daily.    linaCLOtide (LINZESS) 72 mcg Cap capsule Take 1 capsule (72 mcg total) by mouth before breakfast.    naloxone (NARCAN) 4 mg/actuation Spry 1 spray by Nasal route once.    semaglutide (OZEMPIC) 1 mg/dose (2 mg/1.5 mL) PnIj Inject 1 mg under the skin every 7 days. (Patient taking differently: ())    sertraline (ZOLOFT) 25 MG tablet Take 1 tablet (25 mg total) by mouth once daily. For depression and anxiety (Patient not taking: Reported on 2022)    VIOS AEROSOL DELIVERY SYSTEM [place] USE Q 4 H PRN     Family History       Problem Relation (Age of Onset)    Cancer Father    Diabetes Father    Heart failure Father, Mother    Stroke Father          Tobacco Use    Smoking status: Former Smoker     Packs/day: 1.00     Years: 40.00     Pack years: 40.00     Quit date: 2013     Years since quittin.5    Smokeless tobacco: Never Used   Substance and Sexual Activity    Alcohol use: Yes     Alcohol/week: 6.0 standard drinks     Types: 6 Cans of beer per week     Comment: seldom    Drug use: No    Sexual activity: Never     Review of Systems   Constitutional:  Negative for fatigue and fever.   HENT:  Negative for sinus pressure.    Eyes:  Negative for visual disturbance.   Respiratory:  Negative for shortness of breath.    Cardiovascular:  Negative for chest pain.   Gastrointestinal:  Negative for vomiting.   Genitourinary:  Negative for difficulty urinating.   Musculoskeletal:  Positive for arthralgias, back pain and gait problem.   Skin:  Negative for rash.   Neurological:  Negative for headaches.   Psychiatric/Behavioral:  Negative for confusion.    Objective:     Vital Signs (Most Recent):  Temp: 97.8 °F (36.6 °C) (22 1311)  Pulse: 71 (22 1328)  Resp: 18 (22 1311)  BP: 135/78 (22 1328)  SpO2: 96 % (22 1311)   Vital Signs (24h  Range):  Temp:  [97.4 °F (36.3 °C)-98.1 °F (36.7 °C)] 97.8 °F (36.6 °C)  Pulse:  [68-76] 71  Resp:  [14-22] 18  SpO2:  [94 %-97 %] 96 %  BP: (130-190)/(60-87) 135/78     Weight: 114.7 kg (252 lb 13.9 oz)  Body mass index is 35.27 kg/m².    Physical Exam  Constitutional:       General: He is not in acute distress.     Appearance: He is well-developed. He is obese. He is not diaphoretic.   HENT:      Head: Normocephalic and atraumatic.   Eyes:      Pupils: Pupils are equal, round, and reactive to light.   Cardiovascular:      Rate and Rhythm: Normal rate and regular rhythm.      Heart sounds: Normal heart sounds. No murmur heard.    No friction rub. No gallop.   Pulmonary:      Effort: Pulmonary effort is normal. No respiratory distress.      Breath sounds: Normal breath sounds. No stridor. No wheezing or rales.   Abdominal:      General: Bowel sounds are normal. There is no distension.      Palpations: Abdomen is soft. There is no mass.      Tenderness: There is no abdominal tenderness. There is no guarding.   Musculoskeletal:      Comments: Right bka, left transmetatarsal amputation, external fixator LLE   Skin:     General: Skin is warm.      Findings: No erythema.   Neurological:      Mental Status: He is alert and oriented to person, place, and time.         CRANIAL NERVES     CN III, IV, VI   Pupils are equal, round, and reactive to light.     Significant Labs:   Results for orders placed or performed during the hospital encounter of 08/04/22   CBC auto differential   Result Value Ref Range    WBC 5.41 3.90 - 12.70 K/uL    RBC 4.28 (L) 4.60 - 6.20 M/uL    Hemoglobin 13.0 (L) 14.0 - 18.0 g/dL    Hematocrit 42.6 40.0 - 54.0 %     (H) 82 - 98 fL    MCH 30.4 27.0 - 31.0 pg    MCHC 30.5 (L) 32.0 - 36.0 g/dL    RDW 13.5 11.5 - 14.5 %    Platelets 201 150 - 450 K/uL    MPV 9.2 9.2 - 12.9 fL    Immature Granulocytes 0.4 0.0 - 0.5 %    Gran # (ANC) 3.7 1.8 - 7.7 K/uL    Immature Grans (Abs) 0.02 0.00 - 0.04 K/uL     Lymph # 1.0 1.0 - 4.8 K/uL    Mono # 0.6 0.3 - 1.0 K/uL    Eos # 0.0 0.0 - 0.5 K/uL    Baso # 0.02 0.00 - 0.20 K/uL    nRBC 0 0 /100 WBC    Gran % 69.1 38.0 - 73.0 %    Lymph % 18.1 18.0 - 48.0 %    Mono % 11.8 4.0 - 15.0 %    Eosinophil % 0.2 0.0 - 8.0 %    Basophil % 0.4 0.0 - 1.9 %    Differential Method Automated    Comprehensive metabolic panel   Result Value Ref Range    Sodium 136 136 - 145 mmol/L    Potassium 4.9 3.5 - 5.1 mmol/L    Chloride 102 95 - 110 mmol/L    CO2 24 23 - 29 mmol/L    Glucose 250 (H) 70 - 110 mg/dL    BUN 30 (H) 8 - 23 mg/dL    Creatinine 1.8 (H) 0.5 - 1.4 mg/dL    Calcium 8.4 (L) 8.7 - 10.5 mg/dL    Total Protein 6.7 6.0 - 8.4 g/dL    Albumin 3.0 (L) 3.5 - 5.2 g/dL    Total Bilirubin 0.5 0.1 - 1.0 mg/dL    Alkaline Phosphatase 156 (H) 55 - 135 U/L     (H) 10 - 40 U/L    ALT 73 (H) 10 - 44 U/L    Anion Gap 10 8 - 16 mmol/L    eGFR 40 (A) >60 mL/min/1.73 m^2   COVID-19 Rapid Screening   Result Value Ref Range    SARS-CoV-2 RNA, Amplification, Qual Negative Negative   Hemoglobin A1c if not done in past 3 months   Result Value Ref Range    Hemoglobin A1C 7.4 (H) 4.0 - 5.6 %    Estimated Avg Glucose 166 (H) 68 - 131 mg/dL   CBC Auto Differential   Result Value Ref Range    WBC 4.67 3.90 - 12.70 K/uL    RBC 3.76 (L) 4.60 - 6.20 M/uL    Hemoglobin 11.7 (L) 14.0 - 18.0 g/dL    Hematocrit 37.4 (L) 40.0 - 54.0 %     (H) 82 - 98 fL    MCH 31.1 (H) 27.0 - 31.0 pg    MCHC 31.3 (L) 32.0 - 36.0 g/dL    RDW 13.4 11.5 - 14.5 %    Platelets 155 150 - 450 K/uL    MPV 9.4 9.2 - 12.9 fL    Immature Granulocytes 0.2 0.0 - 0.5 %    Gran # (ANC) 3.0 1.8 - 7.7 K/uL    Immature Grans (Abs) 0.01 0.00 - 0.04 K/uL    Lymph # 1.0 1.0 - 4.8 K/uL    Mono # 0.7 0.3 - 1.0 K/uL    Eos # 0.0 0.0 - 0.5 K/uL    Baso # 0.01 0.00 - 0.20 K/uL    nRBC 0 0 /100 WBC    Gran % 64.5 38.0 - 73.0 %    Lymph % 21.0 18.0 - 48.0 %    Mono % 13.9 4.0 - 15.0 %    Eosinophil % 0.2 0.0 - 8.0 %    Basophil % 0.2 0.0 - 1.9 %     Differential Method Automated    Basic Metabolic Panel   Result Value Ref Range    Sodium 135 (L) 136 - 145 mmol/L    Potassium 4.2 3.5 - 5.1 mmol/L    Chloride 105 95 - 110 mmol/L    CO2 21 (L) 23 - 29 mmol/L    Glucose 159 (H) 70 - 110 mg/dL    BUN 26 (H) 8 - 23 mg/dL    Creatinine 1.5 (H) 0.5 - 1.4 mg/dL    Calcium 7.7 (L) 8.7 - 10.5 mg/dL    Anion Gap 9 8 - 16 mmol/L    eGFR 50 (A) >60 mL/min/1.73 m^2   Magnesium   Result Value Ref Range    Magnesium 1.8 1.6 - 2.6 mg/dL   Tacrolimus level   Result Value Ref Range    Tacrolimus Lvl 2.0 (L) 5.0 - 15.0 ng/mL   Comprehensive Metabolic Panel   Result Value Ref Range    Sodium 135 (L) 136 - 145 mmol/L    Potassium 4.2 3.5 - 5.1 mmol/L    Chloride 105 95 - 110 mmol/L    CO2 23 23 - 29 mmol/L    Glucose 163 (H) 70 - 110 mg/dL    BUN 15 8 - 23 mg/dL    Creatinine 1.1 0.5 - 1.4 mg/dL    Calcium 7.8 (L) 8.7 - 10.5 mg/dL    Total Protein 5.3 (L) 6.0 - 8.4 g/dL    Albumin 2.3 (L) 3.5 - 5.2 g/dL    Total Bilirubin 0.7 0.1 - 1.0 mg/dL    Alkaline Phosphatase 120 55 - 135 U/L    AST 35 10 - 40 U/L    ALT 19 10 - 44 U/L    Anion Gap 7 (L) 8 - 16 mmol/L    eGFR >60 >60 mL/min/1.73 m^2   Tacrolimus level   Result Value Ref Range    Tacrolimus Lvl 6.4 5.0 - 15.0 ng/mL   Renal Function Panel   Result Value Ref Range    Glucose 146 (H) 70 - 110 mg/dL    Sodium 135 (L) 136 - 145 mmol/L    Potassium 4.2 3.5 - 5.1 mmol/L    Chloride 104 95 - 110 mmol/L    CO2 23 23 - 29 mmol/L    BUN 16 8 - 23 mg/dL    Calcium 7.9 (L) 8.7 - 10.5 mg/dL    Creatinine 1.2 0.5 - 1.4 mg/dL    Albumin 2.3 (L) 3.5 - 5.2 g/dL    Phosphorus 2.2 (L) 2.7 - 4.5 mg/dL    eGFR >60 >60 mL/min/1.73 m^2    Anion Gap 8 8 - 16 mmol/L   Magnesium   Result Value Ref Range    Magnesium 2.0 1.6 - 2.6 mg/dL   Renal Function Panel   Result Value Ref Range    Glucose 119 (H) 70 - 110 mg/dL    Sodium 135 (L) 136 - 145 mmol/L    Potassium 4.2 3.5 - 5.1 mmol/L    Chloride 103 95 - 110 mmol/L    CO2 24 23 - 29 mmol/L    BUN 14 8  - 23 mg/dL    Calcium 8.0 (L) 8.7 - 10.5 mg/dL    Creatinine 1.2 0.5 - 1.4 mg/dL    Albumin 2.3 (L) 3.5 - 5.2 g/dL    Phosphorus 2.2 (L) 2.7 - 4.5 mg/dL    eGFR >60 >60 mL/min/1.73 m^2    Anion Gap 8 8 - 16 mmol/L   Tacrolimus level   Result Value Ref Range    Tacrolimus Lvl 6.6 5.0 - 15.0 ng/mL   Echo Saline Bubble? No   Result Value Ref Range    BSA 2.27 m2    TDI SEPTAL 0.09 m/s    LV LATERAL E/E' RATIO 8.45 m/s    LV SEPTAL E/E' RATIO 10.33 m/s    LA WIDTH 3.60 cm    IVC diameter 1.30 cm    Left Ventricular Outflow Tract Mean Velocity 0.35664986174 cm/s    Left Ventricular Outflow Tract Mean Gradient 2.13 mmHg    TDI LATERAL 0.11 m/s    LVIDd 3.72 3.5 - 6.0 cm    IVS 1.59 (A) 0.6 - 1.1 cm    Posterior Wall 1.78 (A) 0.6 - 1.1 cm    Ao root annulus 3.36 cm    LVIDs 2.45 2.1 - 4.0 cm    FS 34 28 - 44 %    LA volume 57.29 cm3    Sinus 3.49 cm    STJ 3.49 cm    Ascending aorta 3.28 cm    LV mass 254.30 g    LA size 3.62 cm    Left Ventricle Relative Wall Thickness 0.96 cm    AV mean gradient 3 mmHg    AV valve area 2.80 cm2    AV Velocity Ratio 0.75     AV index (prosthetic) 0.75     MV valve area p 1/2 method 3.53 cm2    E/A ratio 0.81     Mean e' 0.10 m/s    E wave deceleration time [id number] msec    IVRT 82.552415108 msec    LVOT diameter 2.18 cm    LVOT area 3.7 cm2    LVOT peak honorio 0.84 m/s    LVOT peak VTI 18.60 cm    Ao peak honorio 1.12 m/s    Ao VTI 24.8 cm    RVOT peak honorio 0.91 m/s    RVOT peak VTI 18.1 cm    LVOT stroke volume 69.39 cm3    AV peak gradient 5 mmHg    PV mean gradient 2.63 mmHg    E/E' ratio 9.30 m/s    MV Peak E Honorio 0.93 m/s    TR Max Honorio 2.53 m/s    MV stenosis pressure 1/2 time [id number] ms    MV Peak A Honorio 1.15 m/s    LV Systolic Volume 21.24 mL    LV Systolic Volume Index 9.6 mL/m2    LV Diastolic Volume 58.87 mL    LV Diastolic Volume Index 26.52 mL/m2    LA Volume Index 25.8 mL/m2    LV Mass Index 115 g/m2    RA Major Axis 4.42 cm    Left Atrium Minor Axis 4.79 cm     Left Atrium Major Axis 5.62 cm    Triscuspid Valve Regurgitation Peak Gradient 26 mmHg    RA Width 2.57 cm    Right Atrial Pressure (from IVC) 3 mmHg    EF 60 %    TV rest pulmonary artery pressure 29 mmHg   POCT glucose   Result Value Ref Range    POCT Glucose 263 (H) 70 - 110 mg/dL   POCT glucose   Result Value Ref Range    POCT Glucose 264 (H) 70 - 110 mg/dL   POCT glucose   Result Value Ref Range    POCT Glucose 192 (H) 70 - 110 mg/dL   POCT glucose   Result Value Ref Range    POCT Glucose 210 (H) 70 - 110 mg/dL   POCT glucose   Result Value Ref Range    POCT Glucose 157 (H) 70 - 110 mg/dL   POCT glucose   Result Value Ref Range    POCT Glucose 178 (H) 70 - 110 mg/dL   POCT glucose   Result Value Ref Range    POCT Glucose 193 (H) 70 - 110 mg/dL   POCT glucose   Result Value Ref Range    POCT Glucose 189 (H) 70 - 110 mg/dL   POCT glucose   Result Value Ref Range    POCT Glucose 183 (H) 70 - 110 mg/dL   POCT glucose   Result Value Ref Range    POCT Glucose 162 (H) 70 - 110 mg/dL   POCT glucose   Result Value Ref Range    POCT Glucose 160 (H) 70 - 110 mg/dL   POCT glucose   Result Value Ref Range    POCT Glucose 152 (H) 70 - 110 mg/dL   POCT glucose   Result Value Ref Range    POCT Glucose 143 (H) 70 - 110 mg/dL   POCT glucose   Result Value Ref Range    POCT Glucose 163 (H) 70 - 110 mg/dL   POCT glucose   Result Value Ref Range    POCT Glucose 166 (H) 70 - 110 mg/dL   POCT glucose   Result Value Ref Range    POCT Glucose 150 (H) 70 - 110 mg/dL   POCT glucose   Result Value Ref Range    POCT Glucose 123 (H) 70 - 110 mg/dL   POCT glucose   Result Value Ref Range    POCT Glucose 165 (H) 70 - 110 mg/dL     *Note: Due to a large number of results and/or encounters for the requested time period, some results have not been displayed. A complete set of results can be found in Results Review.        Significant Imaging: X-Ray Ankle Complete Left  Narrative: EXAMINATION:  XR ANKLE COMPLETE 3 VIEW LEFT    CLINICAL  HISTORY:  intraop;    COMPARISON:  None  Impression: FINDINGS/  Intraoperative fluoroscopic images were obtained.    Total fluoro time was 1.4 minute and 2 fluoroscopic images were obtained.  See operative report.    Electronically signed by: Ryan Joya MD  Date:    08/04/2022  Time:    15:55  SURG [place] Surgery Fluoro Usage  See OP Notes for results.     IMPRESSION: See OP Notes for results.     This procedure was auto-finalized by: Virtual Radiologist  Echo Saline Bubble? No  · The left ventricle is normal in size with moderate concentric   hypertrophy and normal systolic function.  · The estimated ejection fraction is 60%.  · Normal left ventricular diastolic function.  · Normal right ventricular size with normal right ventricular systolic   function.  · Mild tricuspid regurgitation.  · Normal central venous pressure (3 mmHg).  · The estimated PA systolic pressure is 29 mmHg.     X-Ray Chest AP Portable  Narrative: EXAMINATION:  XR CHEST AP PORTABLE    CLINICAL HISTORY:  Encounter for other preprocedural examination    FINDINGS:  Single view of the chest.  08/15/2021 comparison    Cardiac silhouette is normal.  Aorta demonstrates atherosclerotic disease. The lungs demonstrate no evidence of active disease.  Mild bibasilar atelectasis.  No evidence of pleural effusion or pneumothorax.  Bones appear intact demonstrate scattered degenerative change.  Impression: No acute process seen.    Electronically signed by: Ryan Joya MD  Date:    08/04/2022  Time:    07:14  X-Ray Tibia Fibula 2 View Left  Narrative: EXAMINATION:  XR TIBIA FIBULA 2 VIEW LEFT; XR ANKLE COMPLETE 3 VIEW LEFT    CLINICAL HISTORY:  XR TIBIA FIBULA 2 VIEW LEFT; XR ANKLE COMPLETE 3 VIEW LEFTUnspecified fall, initial encounter    COMPARISON:  None    FINDINGS:  Four views of the left tibia/fibula and three views of the left ankle were obtained.    Comminuted tibial metaphyseal fracture with angulation and mild displacement.  There is extension to  the medial articular surface and involvement of the interosseous membrane between the tibia and fibula.  Transverse fracture of the distal fibula above the lateral malleolus with mild displacement and angulation.  Mildly comminuted proximal fibular fracture.  Moderate joint effusion of the ankle.  Small avulsion injury at the tip of the medial malleolus    Diffuse osteopenia and moderate soft tissue swelling.  Dense vascular calcifications.  Moderate degenerative changes of the ankle and knee joints.  Advanced degenerative changes within the midfoot.  Large plantar calcaneal spur.  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  Impression: See above.    Electronically signed by: Ryan Joya MD  Date:    08/04/2022  Time:    06:59  X-Ray Ankle Complete Left  Narrative: EXAMINATION:  XR TIBIA FIBULA 2 VIEW LEFT; XR ANKLE COMPLETE 3 VIEW LEFT    CLINICAL HISTORY:  XR TIBIA FIBULA 2 VIEW LEFT; XR ANKLE COMPLETE 3 VIEW LEFTUnspecified fall, initial encounter    COMPARISON:  None    FINDINGS:  Four views of the left tibia/fibula and three views of the left ankle were obtained.    Comminuted tibial metaphyseal fracture with angulation and mild displacement.  There is extension to the medial articular surface and involvement of the interosseous membrane between the tibia and fibula.  Transverse fracture of the distal fibula above the lateral malleolus with mild displacement and angulation.  Mildly comminuted proximal fibular fracture.  Moderate joint effusion of the ankle.  Small avulsion injury at the tip of the medial malleolus    Diffuse osteopenia and moderate soft tissue swelling.  Dense vascular calcifications.  Moderate degenerative changes of the ankle and knee joints.  Advanced degenerative changes within the midfoot.  Large plantar calcaneal spur.  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  Impression: See above.    Electronically signed  by: Ryan Joya MD  Date:    08/04/2022  Time:    06:59        Assessment/Plan:      * Closed fracture of left tibia and fibula  Patient with distal tib fib fracture   Ortho consulted on case and plans to operate later today  Cont npo  Morphine PRN  Patient with numerous comorbid conditions including  Cad, pvd, copd, diabetes  Poor functional capacity at home with METS < 4  Chest xray unremarkable  Ekg showed first degree AV block  Will obtain stat echo to evaluate LV function  Pt at least moderate risk for cardiac complications    Advance Care Planning     Patient is a full code and sdm is his sister.     8/5/22 POD#1   NWB LLE per ortho recs Plan to leave the external fixation device on for approximately 6 weeks and then below-knee casting until fracture heals.  Continue pain management  PT/OT rec SNF     Pressure injury of left hip, stage 2  S/p bedside debridement per general surgery       Severe central sleep apnea comorbid with prescribed opioid use  cpap qhs       Kidney transplant recipient  Resume tacrolimus  Hold cellcept     8/6   Prograf level subtherapeutic, consult nephrology     Type 2 diabetes mellitus with stable proliferative retinopathy of both eyes, with long-term current use of insulin  Patient's FSGs are uncontrolled due to hyperglycemia on current medication regimen.  Last A1c reviewed-   Lab Results   Component Value Date    HGBA1C 9.1 (H) 06/26/2020     Most recent fingerstick glucose reviewed- No results for input(s): POCTGLUCOSE in the last 24 hours.  Current correctional scale  High  Maintain anti-hyperglycemic dose as follows-   Antihyperglycemics (From admission, onward)            Start     Stop Route Frequency Ordered    08/04/22 0845  insulin aspart U-100 pen 1-10 Units         -- SubQ Every 6 hours PRN 08/04/22 0745        Hold Oral hypoglycemics while patient is in the hospital.    Coronary artery disease of native artery of native heart with stable angina pectoris  Resume statin    Hold asa and plavix         Osteoarthritis of lumbar spine  Chronic pain  Seeing pain management outpatient       Essential hypertension  Resume home metoprolol  Hydralazine PRN        VTE Risk Mitigation (From admission, onward)         Ordered     apixaban tablet 2.5 mg  2 times daily         08/08/22 1416     IP VTE HIGH RISK PATIENT  Once         08/04/22 1601     Place sequential compression device  Until discontinued         08/04/22 0608                Discharge Planning   LISETH: 8/9/22    Code Status: Full Code   Is the patient medically ready for discharge?:     Reason for patient still in hospital (select all that apply): Pending disposition awaiting  SNF placement   Discharge Plan A: Skilled Nursing Facility                  Sherita Christensen NP  Department of Hospital Medicine   [facility] - Med Surg

## 2022-08-09 NOTE — PROGRESS NOTES
[facility] Medicine  Progress Note    Patient Name: Lavelle Ladd  MRN: [id number]  Patient Class: IP- Inpatient   Admission Date: 8/4/2022  Length of Stay: 5 days  Attending Physician: Chapin Proctor MD  Primary Care Provider: Yahir Calzada MD        Subjective:     Principal Problem:Closed fracture of left tibia and fibula        HPI:  Patient is a 67 y.o.  male with a PMHx of HTN, CAD, DM, PVD, kidney transplant, COPD, right BKA, left transmetatarsal amputation who presents to the Emergency Department for evaluation of a fall which onset suddenly 5 hours ago. Patient states he was trying to get into his wheel chair when he slipped and fell. He reported not being able to put weight on it and laid on the ground for 4-5 hours before calling ems. Patient denied any issues with anesthesia with prior surgeries. Currently complains of pain and nausea. Otherwise denies any other issues.     In the ED, ankle xray showed distal tib fib fracture. Ortho notified and plans to operate later this afternoon. Patient was admitted to .               Overview/Hospital Course:  Pt postop day 1 status post closed reduction left tibial and fibular shaft fractures with application of external fixation device. PT/OT rec SNF placement. General  surgery consulted for eval of L lateral buttock wound, hold apixaban for now.     8/6 POD#2 patient lying in bed resting, c/o lower extremity and back pain. Pt s/p bedside debridement of L lateral buttock wound per general surgery. Resume apixaban. Nephrology consulted for subtherapeutic prograf level. Continue supportive management, awaiting SNF placement.  8/7 POD #3 No acute events overnight. Per surgery patient NWB to LLE. Plan to leave the external fixation device on for approximately 6 weeks and then below-knee casting until fracture heals. Will need f/u  in ortho trauma clinic within 3 weeks. Continue supportive management. Plan SNF placement- patient  selected [facility] for placement.  Anticipate discharge within 24-48 hours.      awaiting SNF placement. Per nephrology continue prograf 1 mg every evening, 1.5 mg every morning pending repeat level.    No acute events overnight. Pt working with PT/OT, awaiting SNF.       Past Medical History:   Diagnosis Date    DINORAH (acute kidney injury) 2016    Arthritis     CAD in native artery 2019    CHF (congestive heart failure)     Chronic obstructive pulmonary disease 2016    Coronary artery disease involving native coronary artery of native heart without angina pectoris 2016    CRI (chronic renal insufficiency) 2019     donor kidney transplant for DM 16     Induction with Thymo x3 and IV solumedrol to total 875mg  Kidney Biopsy  2016: 16 glomeruli, ACR type 1 AVR type 2, significant microcirculatory changes, c4d negative, No DSA, 5 to10% fibrosis. Treated with thymo x8 2016- no rejection      Diastolic heart failure     Encounter for blood transfusion     ESRD on RRT since 10/2013 10/29/2013    Biopsy proven diabetic nephropathy and lymphoplasmacytic interstitial infiltrate not c/w with AIN (ddx sjogrens or assoc with tamm-horsefall protein extravasation)     GERD (gastroesophageal reflux disease)     History of hepatitis C, s/p successful Rx w/ SVR12 - 2017    Completed 12 weeks harvoni w/ SVR    Hyperlipidemia     Hypertension     PAD (peripheral artery disease) 2019    PIC line (peripherally inserted central catheter) flush     Prophylactic immunotherapy     Proteinuria     PVD (peripheral vascular disease) 2017    RLE BKA CT 16 Extensive atherosclerotic disease of the aorta and mesenteric arteries.     Renal hypertension     Type 2 diabetes mellitus with diabetic neuropathy, with long-term current use of insulin 2016    Vitamin B12 deficiency        Past Surgical History:   Procedure Laterality  Date    AORTOGRAPHY WITH SERIALOGRAPHY N/A 6/14/2018    Procedure: LEFT LEG ANGIOGRAM;  Surgeon: Donal Mdconald MD;  Location: [facility] CATH LAB;  Service: Vascular;  Laterality: N/A;    APPLICATION OF LARGE EXTERNAL FIXATION DEVICE TO TIBIA Left 8/4/2022    Procedure: APPLICATION, EXTERNAL FIXATION DEVICE, LARGE, TIBIA;  Surgeon: Good Villa MD;  Location: [facility] OR;  Service: Orthopedics;  Laterality: Left;    av bovine graft      Left UE    AV FISTULA PLACEMENT      left UE    CARDIAC CATHETERIZATION  02/2015    CLOSED REDUCTION OF INJURY OF TIBIA Left 8/4/2022    Procedure: CLOSED REDUCTION, TIBIA;  Surgeon: Good Villa MD;  Location: [facility] OR;  Service: Orthopedics;  Laterality: Left;  Closed reduction left tibial and fibular shaft fractures    CLOSURE OF WOUND Left 9/24/2018    Procedure: CLOSURE, WOUND;  Surgeon: Karla Wheeler DPM;  Location: [facility];  Service: Podiatry;  Laterality: Left;  Secondary Wound closure, extensive    CLOSURE OF WOUND Left 11/5/2018    Procedure: CLOSURE, WOUND;  Surgeon: Karla Wheeler DPM;  Location: [facility] OR;  Service: Podiatry;  Laterality: Left;    DEBRIDEMENT OF MULTIPLE METATARSAL BONES Left 11/5/2018    Procedure: DEBRIDEMENT, METATARSAL BONE, 2 OR MORE BONES;  Surgeon: Karla Wheeler DPM;  Location: [facility] OR;  Service: Podiatry;  Laterality: Left;    EXCISION OF SKIN Left 9/27/2019    Procedure: EXCISION, SKIN;  Surgeon: Lenard Alarcon MD;  Location: [address];  Service: Plastics;  Laterality: Left;  Plastics set, NIMS monitor, ACell    FOOT AMPUTATION THROUGH METATARSAL Left 9/21/2018    Procedure: AMPUTATION, FOOT, TRANSMETATARSAL;  Surgeon: Karla Wheeler DPM;  Location: [facility] OR;  Service: Podiatry;  Laterality: Left;    FOOT AMPUTATION THROUGH METATARSAL Left 10/31/2018    Procedure: AMPUTATION, FOOT, TRANSMETATARSAL;  Surgeon: Karla Wheeler DPM;  Location: [facility];  Service: Podiatry;  Laterality: Left;    FOOT AMPUTATION  THROUGH METATARSAL Left 11/5/2018    Procedure: AMPUTATION, FOOT, TRANSMETATARSAL;  Surgeon: Karla Wheeler DPM;  Location: [facility] OR;  Service: Podiatry;  Laterality: Left;  revisional transmetatarsal amputation, Left foot    IRRIGATION AND DEBRIDEMENT OF LOWER EXTREMITY Left 10/31/2018    Procedure: IRRIGATION AND DEBRIDEMENT, LOWER EXTREMITY;  Surgeon: Karla Wheeler DPM;  Location: [facility] OR;  Service: Podiatry;  Laterality: Left;    KIDNEY TRANSPLANT  05/21/2016    LEFT HEART CATHETERIZATION Left 7/21/2019    Procedure: CATHETERIZATION, HEART, LEFT;  Surgeon: Andrew Valdez MD;  Location: [facility] CATH LAB;  Service: Cardiology;  Laterality: Left;    LEG AMPUTATION THROUGH KNEE  2011    right LE, started as nail puncture leading to diabetic ulcer    SKIN FULL THICKNESS GRAFT Left 10/7/2019    Procedure: APPLICATION, GRAFT, SKIN, FULL-THICKNESS;  Surgeon: Lenard Alarcon MD;  [facility];  Service: Plastics;  Laterality: Left;    SURGICAL REMOVAL OF LESION OF FACE Right 10/7/2019    Procedure: EXCISION, LESION, FACE;  Surgeon: Lenard Alarcon MD;  [facility];  Service: Plastics;  Laterality: Right;       Review of patient's allergies indicates:  No Known Allergies    No current facility-administered medications on file prior to encounter.     Current Outpatient Medications on File Prior to Encounter   Medication Sig    amLODIPine (NORVASC) 10 MG tablet Take 1 tablet (10 mg total) by mouth once daily.    aspirin (ECOTRIN) 81 MG EC tablet Take 1 tablet (81 mg total) by mouth once daily.    atorvastatin (LIPITOR) 80 MG tablet Take 1 tablet (80 mg total) by mouth once daily.    clopidogreL (PLAVIX) 75 mg tablet Take 1 tablet (75 mg total) by mouth once daily.    ergocalciferol (ERGOCALCIFEROL) 50,000 unit Cap Take 1 capsule (50,000 Units total) by mouth every 7 days. Take on Mondays    furosemide (LASIX) 40 MG tablet Take 1 tablet (40 mg total) by mouth daily as needed  (Leg swelling, Nocturnal SOB).    gabapentin (NEURONTIN) 300 MG capsule Take 300 mg by mouth 3 (three) times daily.    HYDROcodone-acetaminophen (NORCO)  mg per tablet 1 tablet by Per G Tube route every 6 (six) hours as needed for Pain.    hydrocortisone 2.5 % cream Apply topically 2 (two) times daily.    insulin aspart U-100 (NOVOLOG) 100 unit/mL (3 mL) InPn pen Inject 5 units three times a day with meal if BG > 300.    levalbuterol (XOPENEX) 1.25 mg/3 mL nebulizer solution Take 3 mLs (1.25 mg total) by nebulization every 6 to 8 hours as needed for Wheezing or Shortness of Breath.    LIDOcaine (LIDODERM) 5 % Place 1 patch onto the skin daily as needed. 12 hours on and 12 hours off    metoprolol tartrate (LOPRESSOR) 25 MG tablet Take 1 tablet (25 mg total) by mouth 2 (two) times daily.    mycophenolate (CELLCEPT) 250 mg Cap Take 1 capsule (250 mg total) by mouth 2 (two) times daily.    nitroGLYCERIN (NITROSTAT) 0.4 MG SL tablet Place 1 tablet (0.4 mg total) under the tongue every 5 (five) minutes as needed.    ondansetron (ZOFRAN-ODT) 4 MG TbDL Take 1 tablet (4 mg total) by mouth every 8 (eight) hours as needed.    sevelamer carbonate (RENVELA) 800 mg Tab Take 800 mg by mouth 3 (three) times daily with meals.    tacrolimus (PROGRAF) 0.5 MG Cap Take 2 capsules (1 mg total) by mouth every 12 (twelve) hours.    cloNIDine (CATAPRES) 0.1 MG tablet Take 1 tablet (0.1 mg total) by mouth 3 (three) times daily. (Patient not taking: Reported on 8/4/2022)    flash glucose scanning reader (FREESTYLE BRYAN 14 DAY READER) Misc 1 Device by Misc.(Non-Drug; Combo Route) route as needed.    flash glucose sensor (FREESTYLE BRYAN 14 DAY SENSOR) Kit 1 kit by Misc.(Non-Drug; Combo Route) route every 14 (fourteen) days.    ipratropium (ATROVENT) 0.02 % nebulizer solution Take 2.5 mLs (500 mcg total) by nebulization 4 (four) times daily. (Patient not taking: Reported on 8/4/2022)    isosorbide mononitrate (IMDUR) 30  MG 24 hr tablet Take 2 tablets (60 mg total) by mouth once daily. (Patient not taking: Reported on 2022)    ketoconazole (NIZORAL) 2 % cream Apply topically 2 (two) times daily.    linaCLOtide (LINZESS) 72 mcg Cap capsule Take 1 capsule (72 mcg total) by mouth before breakfast.    naloxone (NARCAN) 4 mg/actuation Spry 1 spray by Nasal route once.    semaglutide (OZEMPIC) 1 mg/dose (2 mg/1.5 mL) PnIj Inject 1 mg under the skin every 7 days. (Patient taking differently: ())    sertraline (ZOLOFT) 25 MG tablet Take 1 tablet (25 mg total) by mouth once daily. For depression and anxiety (Patient not taking: Reported on 2022)    VIOS AEROSOL DELIVERY SYSTEM [place] USE Q 4 H PRN     Family History       Problem Relation (Age of Onset)    Cancer Father    Diabetes Father    Heart failure Father, Mother    Stroke Father          Tobacco Use    Smoking status: Former Smoker     Packs/day: 1.00     Years: 40.00     Pack years: 40.00     Quit date: 2013     Years since quittin.5    Smokeless tobacco: Never Used   Substance and Sexual Activity    Alcohol use: Yes     Alcohol/week: 6.0 standard drinks     Types: 6 Cans of beer per week     Comment: seldom    Drug use: No    Sexual activity: Never     Review of Systems   Constitutional:  Negative for fatigue and fever.   HENT:  Negative for sinus pressure.    Eyes:  Negative for visual disturbance.   Respiratory:  Negative for shortness of breath.    Cardiovascular:  Negative for chest pain.   Gastrointestinal:  Negative for vomiting.   Genitourinary:  Negative for difficulty urinating.   Musculoskeletal:  Positive for arthralgias, back pain and gait problem.   Skin:  Negative for rash.   Neurological:  Negative for headaches.   Psychiatric/Behavioral:  Negative for confusion.    Objective:     Vital Signs (Most Recent):  Temp: 98.7 °F (37.1 °C) (22)  Pulse: 72 (22)  Resp: 16 (22)  BP: 135/67 (22  1615)  SpO2: 98 % (08/09/22 1615)   Vital Signs (24h Range):  Temp:  [98.1 °F (36.7 °C)-98.8 °F (37.1 °C)] 98.7 °F (37.1 °C)  Pulse:  [67-74] 72  Resp:  [16-20] 16  SpO2:  [93 %-98 %] 98 %  BP: (124-190)/(52-81) 135/67     Weight: 114.7 kg (252 lb 13.9 oz)  Body mass index is 35.27 kg/m².    Physical Exam  Constitutional:       General: He is not in acute distress.     Appearance: He is well-developed. He is obese. He is not diaphoretic.   HENT:      Head: Normocephalic and atraumatic.   Eyes:      Pupils: Pupils are equal, round, and reactive to light.   Cardiovascular:      Rate and Rhythm: Normal rate and regular rhythm.      Heart sounds: Normal heart sounds. No murmur heard.    No friction rub. No gallop.   Pulmonary:      Effort: Pulmonary effort is normal. No respiratory distress.      Breath sounds: Normal breath sounds. No stridor. No wheezing or rales.   Abdominal:      General: Bowel sounds are normal. There is no distension.      Palpations: Abdomen is soft. There is no mass.      Tenderness: There is no abdominal tenderness. There is no guarding.   Musculoskeletal:      Comments: Right bka, left transmetatarsal amputation, external fixator LLE   Skin:     General: Skin is warm.      Findings: No erythema.   Neurological:      Mental Status: He is alert and oriented to person, place, and time.         CRANIAL NERVES     CN III, IV, VI   Pupils are equal, round, and reactive to light.     Significant Labs:   Results for orders placed or performed during the hospital encounter of 08/04/22   CBC auto differential   Result Value Ref Range    WBC 5.41 3.90 - 12.70 K/uL    RBC 4.28 (L) 4.60 - 6.20 M/uL    Hemoglobin 13.0 (L) 14.0 - 18.0 g/dL    Hematocrit 42.6 40.0 - 54.0 %     (H) 82 - 98 fL    MCH 30.4 27.0 - 31.0 pg    MCHC 30.5 (L) 32.0 - 36.0 g/dL    RDW 13.5 11.5 - 14.5 %    Platelets 201 150 - 450 K/uL    MPV 9.2 9.2 - 12.9 fL    Immature Granulocytes 0.4 0.0 - 0.5 %    Gran # (ANC) 3.7 1.8 - 7.7  K/uL    Immature Grans (Abs) 0.02 0.00 - 0.04 K/uL    Lymph # 1.0 1.0 - 4.8 K/uL    Mono # 0.6 0.3 - 1.0 K/uL    Eos # 0.0 0.0 - 0.5 K/uL    Baso # 0.02 0.00 - 0.20 K/uL    nRBC 0 0 /100 WBC    Gran % 69.1 38.0 - 73.0 %    Lymph % 18.1 18.0 - 48.0 %    Mono % 11.8 4.0 - 15.0 %    Eosinophil % 0.2 0.0 - 8.0 %    Basophil % 0.4 0.0 - 1.9 %    Differential Method Automated    Comprehensive metabolic panel   Result Value Ref Range    Sodium 136 136 - 145 mmol/L    Potassium 4.9 3.5 - 5.1 mmol/L    Chloride 102 95 - 110 mmol/L    CO2 24 23 - 29 mmol/L    Glucose 250 (H) 70 - 110 mg/dL    BUN 30 (H) 8 - 23 mg/dL    Creatinine 1.8 (H) 0.5 - 1.4 mg/dL    Calcium 8.4 (L) 8.7 - 10.5 mg/dL    Total Protein 6.7 6.0 - 8.4 g/dL    Albumin 3.0 (L) 3.5 - 5.2 g/dL    Total Bilirubin 0.5 0.1 - 1.0 mg/dL    Alkaline Phosphatase 156 (H) 55 - 135 U/L     (H) 10 - 40 U/L    ALT 73 (H) 10 - 44 U/L    Anion Gap 10 8 - 16 mmol/L    eGFR 40 (A) >60 mL/min/1.73 m^2   COVID-19 Rapid Screening   Result Value Ref Range    SARS-CoV-2 RNA, Amplification, Qual Negative Negative   Hemoglobin A1c if not done in past 3 months   Result Value Ref Range    Hemoglobin A1C 7.4 (H) 4.0 - 5.6 %    Estimated Avg Glucose 166 (H) 68 - 131 mg/dL   CBC Auto Differential   Result Value Ref Range    WBC 4.67 3.90 - 12.70 K/uL    RBC 3.76 (L) 4.60 - 6.20 M/uL    Hemoglobin 11.7 (L) 14.0 - 18.0 g/dL    Hematocrit 37.4 (L) 40.0 - 54.0 %     (H) 82 - 98 fL    MCH 31.1 (H) 27.0 - 31.0 pg    MCHC 31.3 (L) 32.0 - 36.0 g/dL    RDW 13.4 11.5 - 14.5 %    Platelets 155 150 - 450 K/uL    MPV 9.4 9.2 - 12.9 fL    Immature Granulocytes 0.2 0.0 - 0.5 %    Gran # (ANC) 3.0 1.8 - 7.7 K/uL    Immature Grans (Abs) 0.01 0.00 - 0.04 K/uL    Lymph # 1.0 1.0 - 4.8 K/uL    Mono # 0.7 0.3 - 1.0 K/uL    Eos # 0.0 0.0 - 0.5 K/uL    Baso # 0.01 0.00 - 0.20 K/uL    nRBC 0 0 /100 WBC    Gran % 64.5 38.0 - 73.0 %    Lymph % 21.0 18.0 - 48.0 %    Mono % 13.9 4.0 - 15.0 %     Eosinophil % 0.2 0.0 - 8.0 %    Basophil % 0.2 0.0 - 1.9 %    Differential Method Automated    Basic Metabolic Panel   Result Value Ref Range    Sodium 135 (L) 136 - 145 mmol/L    Potassium 4.2 3.5 - 5.1 mmol/L    Chloride 105 95 - 110 mmol/L    CO2 21 (L) 23 - 29 mmol/L    Glucose 159 (H) 70 - 110 mg/dL    BUN 26 (H) 8 - 23 mg/dL    Creatinine 1.5 (H) 0.5 - 1.4 mg/dL    Calcium 7.7 (L) 8.7 - 10.5 mg/dL    Anion Gap 9 8 - 16 mmol/L    eGFR 50 (A) >60 mL/min/1.73 m^2   Magnesium   Result Value Ref Range    Magnesium 1.8 1.6 - 2.6 mg/dL   Tacrolimus level   Result Value Ref Range    Tacrolimus Lvl 2.0 (L) 5.0 - 15.0 ng/mL   Comprehensive Metabolic Panel   Result Value Ref Range    Sodium 135 (L) 136 - 145 mmol/L    Potassium 4.2 3.5 - 5.1 mmol/L    Chloride 105 95 - 110 mmol/L    CO2 23 23 - 29 mmol/L    Glucose 163 (H) 70 - 110 mg/dL    BUN 15 8 - 23 mg/dL    Creatinine 1.1 0.5 - 1.4 mg/dL    Calcium 7.8 (L) 8.7 - 10.5 mg/dL    Total Protein 5.3 (L) 6.0 - 8.4 g/dL    Albumin 2.3 (L) 3.5 - 5.2 g/dL    Total Bilirubin 0.7 0.1 - 1.0 mg/dL    Alkaline Phosphatase 120 55 - 135 U/L    AST 35 10 - 40 U/L    ALT 19 10 - 44 U/L    Anion Gap 7 (L) 8 - 16 mmol/L    eGFR >60 >60 mL/min/1.73 m^2   Tacrolimus level   Result Value Ref Range    Tacrolimus Lvl 6.4 5.0 - 15.0 ng/mL   Renal Function Panel   Result Value Ref Range    Glucose 146 (H) 70 - 110 mg/dL    Sodium 135 (L) 136 - 145 mmol/L    Potassium 4.2 3.5 - 5.1 mmol/L    Chloride 104 95 - 110 mmol/L    CO2 23 23 - 29 mmol/L    BUN 16 8 - 23 mg/dL    Calcium 7.9 (L) 8.7 - 10.5 mg/dL    Creatinine 1.2 0.5 - 1.4 mg/dL    Albumin 2.3 (L) 3.5 - 5.2 g/dL    Phosphorus 2.2 (L) 2.7 - 4.5 mg/dL    eGFR >60 >60 mL/min/1.73 m^2    Anion Gap 8 8 - 16 mmol/L   Magnesium   Result Value Ref Range    Magnesium 2.0 1.6 - 2.6 mg/dL   Renal Function Panel   Result Value Ref Range    Glucose 119 (H) 70 - 110 mg/dL    Sodium 135 (L) 136 - 145 mmol/L    Potassium 4.2 3.5 - 5.1 mmol/L     Chloride 103 95 - 110 mmol/L    CO2 24 23 - 29 mmol/L    BUN 14 8 - 23 mg/dL    Calcium 8.0 (L) 8.7 - 10.5 mg/dL    Creatinine 1.2 0.5 - 1.4 mg/dL    Albumin 2.3 (L) 3.5 - 5.2 g/dL    Phosphorus 2.2 (L) 2.7 - 4.5 mg/dL    eGFR >60 >60 mL/min/1.73 m^2    Anion Gap 8 8 - 16 mmol/L   Tacrolimus level   Result Value Ref Range    Tacrolimus Lvl 6.6 5.0 - 15.0 ng/mL   Renal Function Panel   Result Value Ref Range    Glucose 144 (H) 70 - 110 mg/dL    Sodium 134 (L) 136 - 145 mmol/L    Potassium 4.3 3.5 - 5.1 mmol/L    Chloride 103 95 - 110 mmol/L    CO2 24 23 - 29 mmol/L    BUN 14 8 - 23 mg/dL    Calcium 8.1 (L) 8.7 - 10.5 mg/dL    Creatinine 1.1 0.5 - 1.4 mg/dL    Albumin 2.3 (L) 3.5 - 5.2 g/dL    Phosphorus 2.1 (L) 2.7 - 4.5 mg/dL    eGFR >60 >60 mL/min/1.73 m^2    Anion Gap 7 (L) 8 - 16 mmol/L   Echo Saline Bubble? No   Result Value Ref Range    BSA 2.27 m2    TDI SEPTAL 0.09 m/s    LV LATERAL E/E' RATIO 8.45 m/s    LV SEPTAL E/E' RATIO 10.33 m/s    LA WIDTH 3.60 cm    IVC diameter 1.30 cm    Left Ventricular Outflow Tract Mean Velocity 0.33924063796 cm/s    Left Ventricular Outflow Tract Mean Gradient 2.13 mmHg    TDI LATERAL 0.11 m/s    LVIDd 3.72 3.5 - 6.0 cm    IVS 1.59 (A) 0.6 - 1.1 cm    Posterior Wall 1.78 (A) 0.6 - 1.1 cm    Ao root annulus 3.36 cm    LVIDs 2.45 2.1 - 4.0 cm    FS 34 28 - 44 %    LA volume 57.29 cm3    Sinus 3.49 cm    STJ 3.49 cm    Ascending aorta 3.28 cm    LV mass 254.30 g    LA size 3.62 cm    Left Ventricle Relative Wall Thickness 0.96 cm    AV mean gradient 3 mmHg    AV valve area 2.80 cm2    AV Velocity Ratio 0.75     AV index (prosthetic) 0.75     MV valve area p 1/2 method 3.53 cm2    E/A ratio 0.81     Mean e' 0.10 m/s    E wave deceleration time [id number] msec    IVRT 82.564035255 msec    LVOT diameter 2.18 cm    LVOT area 3.7 cm2    LVOT peak jordy 0.84 m/s    LVOT peak VTI 18.60 cm    Ao peak jordy 1.12 m/s    Ao VTI 24.8 cm    RVOT peak jordy 0.91 m/s    RVOT peak VTI 18.1 cm     LVOT stroke volume 69.39 cm3    AV peak gradient 5 mmHg    PV mean gradient 2.63 mmHg    E/E' ratio 9.30 m/s    MV Peak E Honorio 0.93 m/s    TR Max Honorio 2.53 m/s    MV stenosis pressure 1/2 time [id number] ms    MV Peak A Honorio 1.15 m/s    LV Systolic Volume 21.24 mL    LV Systolic Volume Index 9.6 mL/m2    LV Diastolic Volume 58.87 mL    LV Diastolic Volume Index 26.52 mL/m2    LA Volume Index 25.8 mL/m2    LV Mass Index 115 g/m2    RA Major Axis 4.42 cm    Left Atrium Minor Axis 4.79 cm    Left Atrium Major Axis 5.62 cm    Triscuspid Valve Regurgitation Peak Gradient 26 mmHg    RA Width 2.57 cm    Right Atrial Pressure (from IVC) 3 mmHg    EF 60 %    TV rest pulmonary artery pressure 29 mmHg   POCT glucose   Result Value Ref Range    POCT Glucose 263 (H) 70 - 110 mg/dL   POCT glucose   Result Value Ref Range    POCT Glucose 264 (H) 70 - 110 mg/dL   POCT glucose   Result Value Ref Range    POCT Glucose 192 (H) 70 - 110 mg/dL   POCT glucose   Result Value Ref Range    POCT Glucose 210 (H) 70 - 110 mg/dL   POCT glucose   Result Value Ref Range    POCT Glucose 157 (H) 70 - 110 mg/dL   POCT glucose   Result Value Ref Range    POCT Glucose 178 (H) 70 - 110 mg/dL   POCT glucose   Result Value Ref Range    POCT Glucose 193 (H) 70 - 110 mg/dL   POCT glucose   Result Value Ref Range    POCT Glucose 189 (H) 70 - 110 mg/dL   POCT glucose   Result Value Ref Range    POCT Glucose 183 (H) 70 - 110 mg/dL   POCT glucose   Result Value Ref Range    POCT Glucose 162 (H) 70 - 110 mg/dL   POCT glucose   Result Value Ref Range    POCT Glucose 160 (H) 70 - 110 mg/dL   POCT glucose   Result Value Ref Range    POCT Glucose 152 (H) 70 - 110 mg/dL   POCT glucose   Result Value Ref Range    POCT Glucose 143 (H) 70 - 110 mg/dL   POCT glucose   Result Value Ref Range    POCT Glucose 163 (H) 70 - 110 mg/dL   POCT glucose   Result Value Ref Range    POCT Glucose 166 (H) 70 - 110 mg/dL   POCT glucose   Result Value Ref Range    POCT Glucose  150 (H) 70 - 110 mg/dL   POCT glucose   Result Value Ref Range    POCT Glucose 123 (H) 70 - 110 mg/dL   POCT glucose   Result Value Ref Range    POCT Glucose 165 (H) 70 - 110 mg/dL   POCT glucose   Result Value Ref Range    POCT Glucose 188 (H) 70 - 110 mg/dL   POCT glucose   Result Value Ref Range    POCT Glucose 183 (H) 70 - 110 mg/dL   POCT glucose   Result Value Ref Range    POCT Glucose 145 (H) 70 - 110 mg/dL   POCT glucose   Result Value Ref Range    POCT Glucose 210 (H) 70 - 110 mg/dL   POCT glucose   Result Value Ref Range    POCT Glucose 234 (H) 70 - 110 mg/dL     *Note: Due to a large number of results and/or encounters for the requested time period, some results have not been displayed. A complete set of results can be found in Results Review.        Significant Imaging: X-Ray Ankle Complete Left  Narrative: EXAMINATION:  XR ANKLE COMPLETE 3 VIEW LEFT    CLINICAL HISTORY:  intraop;    COMPARISON:  None  Impression: FINDINGS/  Intraoperative fluoroscopic images were obtained.    Total fluoro time was 1.4 minute and 2 fluoroscopic images were obtained.  See operative report.    Electronically signed by: Ryan Joya MD  Date:    08/04/2022  Time:    15:55  SURG FL Surgery Fluoro Usage  See OP Notes for results.     IMPRESSION: See OP Notes for results.     This procedure was auto-finalized by: Virtual Radiologist  Echo Saline Bubble? No  · The left ventricle is normal in size with moderate concentric   hypertrophy and normal systolic function.  · The estimated ejection fraction is 60%.  · Normal left ventricular diastolic function.  · Normal right ventricular size with normal right ventricular systolic   function.  · Mild tricuspid regurgitation.  · Normal central venous pressure (3 mmHg).  · The estimated PA systolic pressure is 29 mmHg.     X-Ray Chest AP Portable  Narrative: EXAMINATION:  XR CHEST AP PORTABLE    CLINICAL HISTORY:  Encounter for other preprocedural examination    FINDINGS:  Single view  of the chest.  08/15/2021 comparison    Cardiac silhouette is normal.  Aorta demonstrates atherosclerotic disease. The lungs demonstrate no evidence of active disease.  Mild bibasilar atelectasis.  No evidence of pleural effusion or pneumothorax.  Bones appear intact demonstrate scattered degenerative change.  Impression: No acute process seen.    Electronically signed by: Ryan Joya MD  Date:    08/04/2022  Time:    07:14  X-Ray Tibia Fibula 2 View Left  Narrative: EXAMINATION:  XR TIBIA FIBULA 2 VIEW LEFT; XR ANKLE COMPLETE 3 VIEW LEFT    CLINICAL HISTORY:  XR TIBIA FIBULA 2 VIEW LEFT; XR ANKLE COMPLETE 3 VIEW LEFTUnspecified fall, initial encounter    COMPARISON:  None    FINDINGS:  Four views of the left tibia/fibula and three views of the left ankle were obtained.    Comminuted tibial metaphyseal fracture with angulation and mild displacement.  There is extension to the medial articular surface and involvement of the interosseous membrane between the tibia and fibula.  Transverse fracture of the distal fibula above the lateral malleolus with mild displacement and angulation.  Mildly comminuted proximal fibular fracture.  Moderate joint effusion of the ankle.  Small avulsion injury at the tip of the medial malleolus    Diffuse osteopenia and moderate soft tissue swelling.  Dense vascular calcifications.  Moderate degenerative changes of the ankle and knee joints.  Advanced degenerative changes within the midfoot.  Large plantar calcaneal spur.  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  Impression: See above.    Electronically signed by: Ryan Joya MD  Date:    08/04/2022  Time:    06:59  X-Ray Ankle Complete Left  Narrative: EXAMINATION:  XR TIBIA FIBULA 2 VIEW LEFT; XR ANKLE COMPLETE 3 VIEW LEFT    CLINICAL HISTORY:  XR TIBIA FIBULA 2 VIEW LEFT; XR ANKLE COMPLETE 3 VIEW LEFTUnspecified fall, initial encounter    COMPARISON:  None    FINDINGS:  Four views of the left  tibia/fibula and three views of the left ankle were obtained.    Comminuted tibial metaphyseal fracture with angulation and mild displacement.  There is extension to the medial articular surface and involvement of the interosseous membrane between the tibia and fibula.  Transverse fracture of the distal fibula above the lateral malleolus with mild displacement and angulation.  Mildly comminuted proximal fibular fracture.  Moderate joint effusion of the ankle.  Small avulsion injury at the tip of the medial malleolus    Diffuse osteopenia and moderate soft tissue swelling.  Dense vascular calcifications.  Moderate degenerative changes of the ankle and knee joints.  Advanced degenerative changes within the midfoot.  Large plantar calcaneal spur.  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  Impression: See above.    Electronically signed by: Ryan Joya MD  Date:    08/04/2022  Time:    06:59        Assessment/Plan:      * Closed fracture of left tibia and fibula  Patient with distal tib fib fracture   Ortho consulted on case and plans to operate later today  Cont npo  Morphine PRN  Patient with numerous comorbid conditions including  Cad, pvd, copd, diabetes  Poor functional capacity at home with METS < 4  Chest xray unremarkable  Ekg showed first degree AV block  Will obtain stat echo to evaluate LV function  Pt at least moderate risk for cardiac complications    Advance Care Planning     Patient is a full code and sdm is his sister.     8/5/22 POD#1   NWB LLE per ortho recs Plan to leave the external fixation device on for approximately 6 weeks and then below-knee casting until fracture heals.  Continue pain management  PT/OT rec SNF     Pressure injury of left hip, stage 2  S/p bedside debridement per general surgery       Severe central sleep apnea comorbid with prescribed opioid use  cpap qhs       Kidney transplant recipient  Resume tacrolimus  Hold cellcept     8/6    Prograf level subtherapeutic, consult nephrology     Type 2 diabetes mellitus with stable proliferative retinopathy of both eyes, with long-term current use of insulin  Patient's FSGs are uncontrolled due to hyperglycemia on current medication regimen.  Last A1c reviewed-   Lab Results   Component Value Date    HGBA1C 9.1 (H) 06/26/2020     Most recent fingerstick glucose reviewed- No results for input(s): POCTGLUCOSE in the last 24 hours.  Current correctional scale  High  Maintain anti-hyperglycemic dose as follows-   Antihyperglycemics (From admission, onward)            Start     Stop Route Frequency Ordered    08/04/22 0845  insulin aspart U-100 pen 1-10 Units         -- SubQ Every 6 hours PRN 08/04/22 0745        Hold Oral hypoglycemics while patient is in the hospital.    Coronary artery disease of native artery of native heart with stable angina pectoris  Resume statin   Hold asa and plavix         Osteoarthritis of lumbar spine  Chronic pain  Seeing pain management outpatient       Essential hypertension  Resume home metoprolol  Hydralazine PRN        VTE Risk Mitigation (From admission, onward)         Ordered     apixaban tablet 2.5 mg  2 times daily         08/08/22 1416     IP VTE HIGH RISK PATIENT  Once         08/04/22 1601     Place sequential compression device  Until discontinued         08/04/22 0608                Discharge Planning   LISETH:  8/11/22    Code Status: Full Code   Is the patient medically ready for discharge?:     Reason for patient still in hospital (select all that apply): pending SNF placement   Discharge Plan A: Skilled Nursing Facility                  Sherita Christensen NP  Department of Hospital Medicine   [facility] - Med Surg

## 2022-08-09 NOTE — PLAN OF CARE
Patients insurance only covers 4 SNF facilities. [facility], [facility], [facility] and [facility]. [facility] and [facility] can not accept at this time. [facility] referral is pending. [facility] is willing to accept.       Pt stated he would like to discharge to [facility]. Liaison is reviewing referral.

## 2022-08-09 NOTE — PT/OT/SLP PROGRESS
"Physical Therapy  Treatment    Lavelle Ladd   MRN: [id number]   Admitting Diagnosis: Closed fracture of left tibia and fibula    PT Received On: 08/08/22  PT Start Time: 1130     PT Stop Time: 1138    PT Total Time (min): 8 min       Billable Minutes:  Therapeutic Activity 08    Treatment Type: Treatment  PT/PTA: PTA     PTA Visit Number: 1       General Precautions: Standard, fall  Orthopedic Precautions: LLE non weight bearing   Braces: N/A  Respiratory Status: Room air         Subjective:  Communicated with patient's nurse, Ana, and completed Epic chart review prior to session.  Patient refused PT session at this time stating "I just took an oxycodone. I'm leaving today too."    Pain/Comfort  Pain Rating 1: 0/10    Objective:   Patient found with: bed alarm, external fixator, peripheral IV, telemetry     Attempted to educate patient on importance of full and active participation in functional mobility training to prevent further loss of ROM and strength. Encouraged patient to perform AROM TE to BLE within all available planes of motion.     Prior to leaving room, therapist asked patient if there was anything else he needed at this time to which the patient responded "Yeah, a gun so I can shoot myself."   When attempting to give patient encouragement towards progress potential and recovery patient replied with "Yeah, after I shoot myself."    Patient's nurse and charge nurse notified immediately of patient's statements for further assessment and follow up.     AM-PAC 6 CLICK MOBILITY  How much help from another person does this patient currently need?   1 = Unable, Total/Dependent Assistance  2 = A lot, Maximum/Moderate Assistance  3 = A little, Minimum/Contact Guard/Supervision  4 = None, Modified [place]/Independent         AM-PAC Raw Score CMS G-Code Modifier Level of Impairment Assistance   6 % Total / Unable   7 - 9 CM 80 - 100% Maximal Assist   10 - 14 CL 60 - 80% Moderate Assist   15 - 19 CK " 40 - 60% Moderate Assist   20 - 22 CJ 20 - 40% Minimal Assist   23 CI 1-20% SBA / CGA   24 CH 0% Independent/ Mod I     Patient left right sidelying with all lines intact and call button in reach.    Assessment:  Lavelle Ladd is a 67 y.o. male with a medical diagnosis of Closed fracture of left tibia and fibula and presents with overall decline in functional mobility. Patient would continue to benefit from skilled PT to address functional limitations listed below in order to return to PLOF/decrease caregiver burden.    Rehab identified problem list/impairments: Rehab identified problem list/impairments: weakness, impaired endurance, impaired sensation, impaired self care skills, impaired functional mobility, gait instability, impaired balance, impaired cognition, decreased coordination, decreased lower extremity function, decreased safety awareness, pain, decreased ROM, impaired skin, edema, impaired cardiopulmonary response to activity, orthopedic precautions    Rehab potential is fair.    Activity tolerance: unable to determine    Discharge recommendations: Discharge Facility/Level of Care Needs: nursing facility, skilled     Barriers to discharge:      Equipment recommendations: Equipment Needed After Discharge: other (see comments) (TBD BY NEXT LEVEL OF CARE)     GOALS:   Multidisciplinary Problems     Physical Therapy Goals        Problem: Physical Therapy    Goal Priority Disciplines Outcome Goal Variances Interventions   Physical Therapy Goal     PT, PT/OT      Description: LT22  1. PT WILL COMPLETE BED MOBILITY IND  2. PT WILL SCOOT FOR T/F TRAINING NWB  B LE WITH SBA IN BED  3. PT WILL COMPLETE SLIDE BOARD T/F TO WC WITH MAX A>MODA                   PLAN:    Patient to be seen 3 x/week  to address the above listed problems via therapeutic activities, therapeutic exercises, neuromuscular re-education, wheelchair management/training  Plan of Care expires: 22  Plan of Care reviewed with:  patient         08/08/2022

## 2022-08-09 NOTE — PROGRESS NOTES
Lavelle Ladd is a 67 y.o. male patient.     Subjective   The patient is 5 days post external fixation of left tibial and fibular fractures.  Leg pain is slightly better.  Complaining of a headache.  Says it is not a typical migraine but could be.    1. Closed fracture of left tibia and fibula, initial encounter    2. Fall    3. Fracture tibia/fibula, left, closed, initial encounter    4. Preop examination    5. Coronary artery disease    6. Closed fracture of left tibia and fibula with routine healing, subsequent encounter    7. Pressure injury of left hip, stage 2    8. Closed fracture of left tibia and fibula      Past Medical History:   Diagnosis Date    DINORAH (acute kidney injury) 2016    Arthritis     CAD in native artery 2019    CHF (congestive heart failure)     Chronic obstructive pulmonary disease 2016    Coronary artery disease involving native coronary artery of native heart without angina pectoris 2016    CRI (chronic renal insufficiency) 2019     donor kidney transplant for DM 16     Induction with Thymo x3 and IV solumedrol to total 875mg  Kidney Biopsy  2016: 16 glomeruli, ACR type 1 AVR type 2, significant microcirculatory changes, c4d negative, No DSA, 5 to10% fibrosis. Treated with thymo x8 2016- no rejection      Diastolic heart failure     Encounter for blood transfusion     ESRD on RRT since 10/2013 10/29/2013    Biopsy proven diabetic nephropathy and lymphoplasmacytic interstitial infiltrate not c/w with AIN (ddx sjogrens or assoc with tamm-horsefall protein extravasation)     GERD (gastroesophageal reflux disease)     History of hepatitis C, s/p successful Rx w/ SVR12 - 2017    Completed 12 weeks harvoni w/ SVR    Hyperlipidemia     Hypertension     PAD (peripheral artery disease) 2019    PIC line (peripherally inserted central catheter) flush     Prophylactic immunotherapy     Proteinuria     PVD (peripheral  vascular disease) 6/26/2017    RLE BKA CT 12/11/16 Extensive atherosclerotic disease of the aorta and mesenteric arteries.     Renal hypertension     Type 2 diabetes mellitus with diabetic neuropathy, with long-term current use of insulin 12/1/2016    Vitamin B12 deficiency      Past Surgical History Pertinent Negatives:   Procedure Date Noted    CARDIAC SURGERY 12/08/2015     Scheduled Meds:   amLODIPine  10 mg Oral Daily    apixaban  2.5 mg Oral BID    atorvastatin  80 mg Oral Daily    collagenase   Topical (Top) Daily    fluticasone propionate  2 spray Each Nostril Daily    methocarbamoL  500 mg Oral QID    metoprolol tartrate  25 mg Oral BID    mupirocin   Topical (Top) Daily    sertraline  25 mg Oral Daily    sodium hypochlorite 0.125%   Topical (Top) BID    tacrolimus  1 mg Oral Daily PM    tacrolimus  1.5 mg Oral Daily AM     Continuous Infusions:  PRN Meds:acetaminophen, albuterol-ipratropium, aluminum-magnesium hydroxide-simethicone, benzonatate, dextrose 10%, dextrose 10%, glucagon (human recombinant), guaiFENesin 100 mg/5 ml, hydrALAZINE, insulin aspart U-100, melatonin, morphine, ondansetron, oxyCODONE-acetaminophen, sodium chloride 0.9%, sodium chloride 0.9%, sodium chloride 0.9%    Review of patient's allergies indicates:  No Known Allergies  Active Hospital Problems    Diagnosis  POA    *Closed fracture of left tibia and fibula [S82.202A, S82.402A]  Yes    Pressure injury of left hip, stage 2 [L89.222]  Yes    Severe central sleep apnea comorbid with prescribed opioid use [F11.90, G47.37]  Yes    Kidney transplant recipient [Z94.0]  Not Applicable    Type 2 diabetes mellitus with stable proliferative retinopathy of both eyes, with long-term current use of insulin [E11.3553, Z79.4]  Not Applicable    Coronary artery disease of native artery of native heart with stable angina pectoris [I25.118]  Yes    Osteoarthritis of lumbar spine [M47.816]  Yes    Essential hypertension [I10]   "Yes      Resolved Hospital Problems   No resolved problems to display.     Blood pressure (!) 125/55, pulse 70, temperature 98.7 °F (37.1 °C), temperature source Oral, resp. rate 16, height 5' 11" (1.803 m), weight 114.7 kg (252 lb 13.9 oz), SpO2 (!) 93 %.    Objective   Well-developed overweight male in no acute distress.  Awake, alert, oriented, cooperative with evaluation.    Left leg dressings around the external fixation device are intact.     Assessment & Plan   The patient is improved.  He is working with occupational and physical therapy.  Difficult for him to do much without provoking pain.  Perhaps having muscle tension headaches.  Will start methocarbamol.  Await rehab placement.       8/9/2022        [place] MD, FAAOS [facility], Orthopedic Trauma Service  [place]    "

## 2022-08-09 NOTE — CONSULTS
[facility]  Nephrology  Consult Note    Patient Name: Lavelle Ladd  MRN: [id number]  Admission Date: 2022  Hospital Length of Stay: 5 days  Attending Provider: Chapin Proctor MD   Primary Care Physician: Yahir Calzada MD  Principal Problem:Closed fracture of left tibia and fibula    Consults  Subjective:     HPI:  67-year-old male who is admitted after falling and sustaining a lower extremity fracture.  Now status post external fixation.  Has history of kidney transplant.  Nephrology has been consulted for assistance with management of immunosuppression and kidney transplant.  The patient was seen in his hospital room.  In bed resting comfortably.  No acute distress noted.  He underwent kidney transplant in May of 2016. He states that he has not seen a general nephrologist or a transplant nephrologist in almost 2 years.  He states that no one has been following his Prograf levels.  He does not remember his home dose of Prograf.    2022:  Patient was seen in his hospital room.  In bed resting comfortably.  No acute distress noted.  Discussed nephrology related issues with he and his sister.  All nephrology related questions were answered to their satisfaction.    Review of systems:  No shortness of breath or chest discomfort.  No fevers or chills.  No nausea vomiting diarrhea.    2022:  Patient was seen in his hospital room.  In bed resting comfortably.  No acute distress noted.  No new complaints from overnight.    Past Medical History:   Diagnosis Date    DINORAH (acute kidney injury) 2016    Arthritis     CAD in native artery 2019    CHF (congestive heart failure)     Chronic obstructive pulmonary disease 2016    Coronary artery disease involving native coronary artery of native heart without angina pectoris 2016    CRI (chronic renal insufficiency) 2019     donor kidney transplant for DM 16     Induction with Thymo x3 and IV solumedrol to total  875mg  Kidney Biopsy  5/31/2016: 16 glomeruli, ACR type 1 AVR type 2, significant microcirculatory changes, c4d negative, No DSA, 5 to10% fibrosis. Treated with thymo x8 6/21/2016- no rejection      Diastolic heart failure     Encounter for blood transfusion     ESRD on RRT since 10/2013 10/29/2013    Biopsy proven diabetic nephropathy and lymphoplasmacytic interstitial infiltrate not c/w with AIN (ddx sjogrens or assoc with tamm-horsefall protein extravasation)     GERD (gastroesophageal reflux disease)     History of hepatitis C, s/p successful Rx w/ SVR12 - 4/2017 4/5/2017    Completed 12 weeks harvoni w/ SVR    Hyperlipidemia     Hypertension     PAD (peripheral artery disease) 7/21/2019    PIC line (peripherally inserted central catheter) flush     Prophylactic immunotherapy     Proteinuria     PVD (peripheral vascular disease) 6/26/2017    RLE BKA CT 12/11/16 Extensive atherosclerotic disease of the aorta and mesenteric arteries.     Renal hypertension     Type 2 diabetes mellitus with diabetic neuropathy, with long-term current use of insulin 12/1/2016    Vitamin B12 deficiency        Past Surgical History:   Procedure Laterality Date    AORTOGRAPHY WITH SERIALOGRAPHY N/A 6/14/2018    Procedure: LEFT LEG ANGIOGRAM;  Surgeon: Donal Mcdonald MD;  Location: [facility] CATH LAB;  Service: Vascular;  Laterality: N/A;    APPLICATION OF LARGE EXTERNAL FIXATION DEVICE TO TIBIA Left 8/4/2022    Procedure: APPLICATION, EXTERNAL FIXATION DEVICE, LARGE, TIBIA;  Surgeon: Good Villa MD;  Location: [facility] OR;  Service: Orthopedics;  Laterality: Left;    av bovine graft      Left UE    AV FISTULA PLACEMENT      left UE    CARDIAC CATHETERIZATION  02/2015    CLOSED REDUCTION OF INJURY OF TIBIA Left 8/4/2022    Procedure: CLOSED REDUCTION, TIBIA;  Surgeon: Good Villa MD;  Location: [facility] OR;  Service: Orthopedics;  Laterality: Left;  Closed reduction left tibial and fibular shaft fractures    CLOSURE OF  WOUND Left 9/24/2018    Procedure: CLOSURE, WOUND;  Surgeon: Karla Wheeler DPM;  Location: [facility] OR;  Service: Podiatry;  Laterality: Left;  Secondary Wound closure, extensive    CLOSURE OF WOUND Left 11/5/2018    Procedure: CLOSURE, WOUND;  Surgeon: Karla Wheeler DPM;  Location: [facility] OR;  Service: Podiatry;  Laterality: Left;    DEBRIDEMENT OF MULTIPLE METATARSAL BONES Left 11/5/2018    Procedure: DEBRIDEMENT, METATARSAL BONE, 2 OR MORE BONES;  Surgeon: Karla Wheeler DPM;  Location: [facility] OR;  Service: Podiatry;  Laterality: Left;    EXCISION OF SKIN Left 9/27/2019    Procedure: EXCISION, SKIN;  Surgeon: Lenard Alarcon MD;  Location: [address];  Service: Plastics;  Laterality: Left;  Plastics set, NIMS monitor, [employer]    FOOT AMPUTATION THROUGH METATARSAL Left 9/21/2018    Procedure: AMPUTATION, FOOT, TRANSMETATARSAL;  Surgeon: Karla Wheeler DPM;  Location: [facility] OR;  Service: Podiatry;  Laterality: Left;    FOOT AMPUTATION THROUGH METATARSAL Left 10/31/2018    Procedure: AMPUTATION, FOOT, TRANSMETATARSAL;  Surgeon: Karla Wheeler DPM;  Location: [facility] OR;  Service: Podiatry;  Laterality: Left;    FOOT AMPUTATION THROUGH METATARSAL Left 11/5/2018    Procedure: AMPUTATION, FOOT, TRANSMETATARSAL;  Surgeon: Karla Wheeler DPM;  Location: [facility] OR;  Service: Podiatry;  Laterality: Left;  revisional transmetatarsal amputation, Left foot    IRRIGATION AND DEBRIDEMENT OF LOWER EXTREMITY Left 10/31/2018    Procedure: IRRIGATION AND DEBRIDEMENT, LOWER EXTREMITY;  Surgeon: Karla Wheeler DPM;  Location: [facility] OR;  Service: Podiatry;  Laterality: Left;    KIDNEY TRANSPLANT  05/21/2016    LEFT HEART CATHETERIZATION Left 7/21/2019    Procedure: CATHETERIZATION, HEART, LEFT;  Surgeon: Andrew Valdez MD;  Location: [facility] CATH LAB;  Service: Cardiology;  Laterality: Left;    LEG AMPUTATION THROUGH KNEE  2011    right LE, started as nail puncture leading to diabetic ulcer    SKIN  FULL THICKNESS GRAFT Left 10/7/2019    Procedure: APPLICATION, GRAFT, SKIN, FULL-THICKNESS;  Surgeon: Lenard Alarcon MD;  Location: [facility] OR 2ND FLR;  Service: Plastics;  Laterality: Left;    SURGICAL REMOVAL OF LESION OF FACE Right 10/7/2019    Procedure: EXCISION, LESION, FACE;  Surgeon: Lenard Alarcon MD;  Location: [facility] OR 2ND FLR;  Service: Plastics;  Laterality: Right;       Review of patient's allergies indicates:  No Known Allergies  Current Facility-Administered Medications   Medication Frequency    acetaminophen tablet 650 mg Q4H PRN    albuterol-ipratropium 2.5 mg-0.5 mg/3 mL nebulizer solution 3 mL Q4H PRN    aluminum-magnesium hydroxide-simethicone 200-200-20 mg/5 mL suspension 30 mL Q6H PRN    amLODIPine tablet 10 mg Daily    apixaban tablet 2.5 mg BID    atorvastatin tablet 80 mg Daily    benzonatate capsule 100 mg TID PRN    collagenase ointment Daily    dextrose 10% bolus 125 mL PRN    dextrose 10% bolus 250 mL PRN    fluticasone propionate 50 mcg/actuation nasal spray 100 mcg Daily    glucagon (human recombinant) injection 1 mg PRN    guaiFENesin 100 mg/5 ml syrup 200 mg Q4H PRN    hydrALAZINE injection 10 mg Q8H PRN    insulin aspart U-100 pen 1-10 Units Q6H PRN    melatonin tablet 6 mg Nightly PRN    metoprolol tartrate (LOPRESSOR) tablet 25 mg BID    morphine injection 2 mg Q2H PRN    mupirocin 2 % ointment Daily    ondansetron disintegrating tablet 8 mg Q8H PRN    oxyCODONE-acetaminophen  mg per tablet 1 tablet Q4H PRN    sertraline tablet 25 mg Daily    sodium chloride 0.9% flush 10 mL PRN    sodium chloride 0.9% flush 10 mL PRN    sodium chloride 0.9% flush 10 mL PRN    sodium hypochlorite 0.125% external solution BID    tacrolimus capsule 1 mg Daily PM    tacrolimus capsule 1.5 mg Daily AM     Family History     Problem Relation (Age of Onset)    Cancer Father    Diabetes Father    Heart failure Father, Mother    Stroke Father        Tobacco Use     Smoking status: Former Smoker     Packs/day: 1.00     Years: 40.00     Pack years: 40.00     Quit date: 2013     Years since quittin.5    Smokeless tobacco: Never Used   Substance and Sexual Activity    Alcohol use: Yes     Alcohol/week: 6.0 standard drinks     Types: 6 Cans of beer per week     Comment: seldom    Drug use: No    Sexual activity: Never     Review of Systems   Constitutional: Negative.    HENT: Negative.    Eyes: Negative.    Respiratory: Negative.    Cardiovascular: Negative.    Gastrointestinal: Negative.    Genitourinary: Negative.    Musculoskeletal:        Discomfort in his left lower extremity has surgical site.   Skin: Negative.    Neurological: Negative.    Hematological: Negative.      Objective:     Vital Signs (Most Recent):  Temp: 98.4 °F (36.9 °C) (22)  Pulse: 74 (22)  Resp: 16 (22)  BP: (!) 154/78 (22)  SpO2: 98 % (22)  O2 Device (Oxygen Therapy): room air (22) Vital Signs (24h Range):  Temp:  [97.8 °F (36.6 °C)-98.8 °F (37.1 °C)] 98.4 °F (36.9 °C)  Pulse:  [67-74] 74  Resp:  [13-20] 16  SpO2:  [95 %-98 %] 98 %  BP: (124-190)/(52-81) 154/78     Weight: 114.7 kg (252 lb 13.9 oz) (22 0019)  Body mass index is 35.27 kg/m².  Body surface area is 2.4 meters squared.    I/O last 3 completed shifts:  In: 360 [P.O.:360]  Out: 1600 [Urine:1600]    Physical Exam  Constitutional:       Appearance: Normal appearance.   HENT:      Head: Normocephalic and atraumatic.   Eyes:      General: No scleral icterus.     Extraocular Movements: Extraocular movements intact.      Pupils: Pupils are equal, round, and reactive to light.   Pulmonary:      Effort: Pulmonary effort is normal.      Breath sounds: No stridor.   Musculoskeletal:      Comments: External fixation device left lower extremity.   Skin:     General: Skin is warm.   Neurological:      General: No focal deficit present.      Mental Status: He is alert and  oriented to person, place, and time.   Psychiatric:         Mood and Affect: Mood normal.         Behavior: Behavior normal.         Significant Labs:  BMP:   Recent Labs   Lab 08/07/22  0515 08/08/22  0501 08/09/22  0633   *   < > 144*   *   < > 134*   K 4.2   < > 4.3      < > 103   CO2 23   < > 24   BUN 16   < > 14   CREATININE 1.2   < > 1.1   CALCIUM 7.9*   < > 8.1*   MG 2.0  --   --     < > = values in this interval not displayed.     CMP:   Recent Labs   Lab 08/06/22  1010 08/07/22  0515 08/09/22  0633   *   < > 144*   CALCIUM 7.8*   < > 8.1*   ALBUMIN 2.3*   < > 2.3*   PROT 5.3*  --   --    *   < > 134*   K 4.2   < > 4.3   CO2 23   < > 24      < > 103   BUN 15   < > 14   CREATININE 1.1   < > 1.1   ALKPHOS 120  --   --    ALT 19  --   --    AST 35  --   --    BILITOT 0.7  --   --     < > = values in this interval not displayed.     All labs within the past 24 hours have been reviewed.    Significant Imaging:  Labs: Reviewed  Reviewed    Assessment/Plan:     Active Diagnoses:    Diagnosis Date Noted POA    PRINCIPAL PROBLEM:  Closed fracture of left tibia and fibula [S82.202A, S82.402A] 08/04/2022 Yes    Pressure injury of left hip, stage 2 [L89.222] 08/05/2022 Yes    Severe central sleep apnea comorbid with prescribed opioid use [F11.90, G47.37] 01/29/2020 Yes    Kidney transplant recipient [Z94.0] 04/07/2017 Not Applicable    Type 2 diabetes mellitus with stable proliferative retinopathy of both eyes, with long-term current use of insulin [E11.3553, Z79.4] 12/01/2016 Not Applicable    Coronary artery disease of native artery of native heart with stable angina pectoris [I25.118] 07/01/2016 Yes    Osteoarthritis of lumbar spine [M47.816]  Yes    Essential hypertension [I10] 02/20/2015 Yes      Problems Resolved During this Admission:       1. Kidney transplant status:  Status post kidney transplant in May of 2016.  Cause of renal failure was diabetic nephropathy.   Creatinine remains stable at 1.2.  Continues nonoliguric with over 1200 cc urine output reported.    2. Immunosuppression:  Prograf level yesterday was 6.6.  Today is day 2 on 1.5 mg a.m. 1 mg p.m..  Waiting on today's Prograf level to return.  Goal for Prograf level is between 3 and 6. If he remains over 6 will plan to decrease to 1 mg twice a day.      Mycophenolate on hold secondary to lower extremity fracture.    Magnesium was stable 2.0.        Thank you for your consult.     Jose David Hooker MD  Nephrology  [facility]

## 2022-08-09 NOTE — PT/OT/SLP PROGRESS
Physical Therapy  Treatment    Lavelle Ladd   MRN: [id number]   Admitting Diagnosis: Closed fracture of left tibia and fibula    PT Received On: 08/09/22  PT Start Time: 1040     PT Stop Time: 1105    PT Total Time (min): 25 min       Billable Minutes:  Therapeutic Activity 15 and Therapeutic Exercise 10    Treatment Type: Treatment  PT/PTA: PTA     PTA Visit Number: 2       General Precautions: Standard, fall  Orthopedic Precautions: LLE non weight bearing   Braces: N/A  Respiratory Status: Room air         Subjective:  Communicated with patient's nurse, Sherita DURAN, and completed Epic chart review prior to session.  Patient agreed to PT session with encouragement.     Pain/Comfort  Pain Rating 1: 7/10 (LLE, BACK, IV SITE & HA)  Pain Addressed 1: Other (see comments) (ACTIVITY PACING)    Objective:   Patient found with: bed alarm, external fixator, peripheral IV, telemetry    Functional Mobility:  Supine > sit EOB: Min A     Forward scoot towards EOB: SBA    Seated EOB x15 min total focusing on increased tolerance to upright position, core stability, trunk control and CV endurance.  Independent to maintain sitting balance    Completed AROM TE to BLE while EOB x10 reps: LAQ, Hip Flex (ensured no contact with floor when moving LLE)  tricep push ups using bed rails 3x5 reps w/ LLE sustained in NWB throughout     Lateral scoot towards HOB: Min A (compliant with NWB LLE)    Sit > supine: Min A   When attempting to reposition himself in supine, patient pushed through LLE in hook lying position despite having been cued not to do so.    Educated patient on importance of increased tolerance to upright position in order to promote CV endurance. Encouraged patient to utilize elevated HOB to achieve simulated chair position until able to safely complete T/F. Encouraged patient to perform AROM TE to BLE throughout the day in order to prevent further loss of ROM and strength. Re enforced importance of utilizing call light to  meet needs in room. Patient verbalized understanding of all education and agreed to comply.    AM-PAC 6 CLICK MOBILITY  How much help from another person does this patient currently need?   1 = Unable, Total/Dependent Assistance  2 = A lot, Maximum/Moderate Assistance  3 = A little, Minimum/Contact Guard/Supervision  4 = None, Modified Mobile/Independent    Turning over in bed (including adjusting bedclothes, sheets and blankets)?: 4  Sitting down on and standing up from a chair with arms (e.g., wheelchair, bedside commode, etc.): 1  Moving from lying on back to sitting on the side of the bed?: 3  Moving to and from a bed to a chair (including a wheelchair)?: 1  Need to walk in hospital room?: 1  Climbing 3-5 steps with a railing?: 1  Basic Mobility Total Score: 11    AM-PAC Raw Score CMS G-Code Modifier Level of Impairment Assistance   6 % Total / Unable   7 - 9 CM 80 - 100% Maximal Assist   10 - 14 CL 60 - 80% Moderate Assist   15 - 19 CK 40 - 60% Moderate Assist   20 - 22 CJ 20 - 40% Minimal Assist   23 CI 1-20% SBA / CGA   24 CH 0% Independent/ Mod I     Patient left HOB elevated with all lines intact, call button in reach, bed alarm on and nurse notified.    Assessment:  Lavelle Ladd is a 67 y.o. male with a medical diagnosis of Closed fracture of left tibia and fibula and presents with overall decline in functional mobility. Patient would continue to benefit from skilled PT to address functional limitations listed below in order to return to PLOF/decrease caregiver burden.     Rehab identified problem list/impairments: Rehab identified problem list/impairments: weakness, impaired endurance, impaired sensation, impaired self care skills, impaired functional mobility, impaired cognition, decreased coordination, decreased lower extremity function, decreased safety awareness, pain, decreased ROM, impaired skin, edema, impaired cardiopulmonary response to activity, orthopedic precautions    Rehab  potential is fair.    Activity tolerance: Fair    Discharge recommendations: Discharge Facility/Level of Care Needs: nursing facility, skilled     Barriers to discharge:      Equipment recommendations: Equipment Needed After Discharge: other (see comments) (TBD BY NEXT LEVEL OF CARE)     GOALS:   Multidisciplinary Problems     Physical Therapy Goals        Problem: Physical Therapy    Goal Priority Disciplines Outcome Goal Variances Interventions   Physical Therapy Goal     PT, PT/OT      Description: LT22  1. PT WILL COMPLETE BED MOBILITY IND  2. PT WILL SCOOT FOR T/F TRAINING NWB  B LE WITH SBA IN BED  3. PT WILL COMPLETE SLIDE BOARD T/F TO WC WITH MAX A>MODA                   PLAN:    Patient to be seen 3 x/week  to address the above listed problems via therapeutic activities, therapeutic exercises, neuromuscular re-education, wheelchair management/training  Plan of Care expires: 22  Plan of Care reviewed with: patient         2022

## 2022-08-09 NOTE — PLAN OF CARE
Pt resting in bed, remains free of falls and injuries this shift. VSS. No obvious s/sx of distress noted. Pain controlled with PRN medications. POC reviewed with the patient, verbalized understanding. No further needs at this time, call light within reach. Will continue POC as ordered.

## 2022-08-09 NOTE — SUBJECTIVE & OBJECTIVE
Past Medical History:   Diagnosis Date    DINORAH (acute kidney injury) 2016    Arthritis     CAD in native artery 2019    CHF (congestive heart failure)     Chronic obstructive pulmonary disease 2016    Coronary artery disease involving native coronary artery of native heart without angina pectoris 2016    CRI (chronic renal insufficiency) 2019     donor kidney transplant for DM 16     Induction with Thymo x3 and IV solumedrol to total 875mg  Kidney Biopsy  2016: 16 glomeruli, ACR type 1 AVR type 2, significant microcirculatory changes, c4d negative, No DSA, 5 to10% fibrosis. Treated with thymo x8 2016- no rejection      Diastolic heart failure     Encounter for blood transfusion     ESRD on RRT since 10/2013 10/29/2013    Biopsy proven diabetic nephropathy and lymphoplasmacytic interstitial infiltrate not c/w with AIN (ddx sjogrens or assoc with tamm-horsefall protein extravasation)     GERD (gastroesophageal reflux disease)     History of hepatitis C, s/p successful Rx w/ SVR12 - 2017    Completed 12 weeks harvoni w/ SVR    Hyperlipidemia     Hypertension     PAD (peripheral artery disease) 2019    PIC line (peripherally inserted central catheter) flush     Prophylactic immunotherapy     Proteinuria     PVD (peripheral vascular disease) 2017    RLE BKA CT 16 Extensive atherosclerotic disease of the aorta and mesenteric arteries.     Renal hypertension     Type 2 diabetes mellitus with diabetic neuropathy, with long-term current use of insulin 2016    Vitamin B12 deficiency        Past Surgical History:   Procedure Laterality Date    AORTOGRAPHY WITH SERIALOGRAPHY N/A 2018    Procedure: LEFT LEG ANGIOGRAM;  Surgeon: Donal Mcdonald MD;  Location: [facility] CATH LAB;  Service: Vascular;  Laterality: N/A;    APPLICATION OF LARGE EXTERNAL FIXATION DEVICE TO TIBIA Left 2022    Procedure: APPLICATION, EXTERNAL FIXATION  DEVICE, LARGE, TIBIA;  Surgeon: Good Villa MD;  Location: [facility] OR;  Service: Orthopedics;  Laterality: Left;    av bovine graft      Left UE    AV FISTULA PLACEMENT      left UE    CARDIAC CATHETERIZATION  02/2015    CLOSED REDUCTION OF INJURY OF TIBIA Left 8/4/2022    Procedure: CLOSED REDUCTION, TIBIA;  Surgeon: Good Villa MD;  Location: [facility] OR;  Service: Orthopedics;  Laterality: Left;  Closed reduction left tibial and fibular shaft fractures    CLOSURE OF WOUND Left 9/24/2018    Procedure: CLOSURE, WOUND;  Surgeon: Karla Wheeler DPM;  Location: [facility] OR;  Service: Podiatry;  Laterality: Left;  Secondary Wound closure, extensive    CLOSURE OF WOUND Left 11/5/2018    Procedure: CLOSURE, WOUND;  Surgeon: Karla Wheeler DPM;  Location: [facility] OR;  Service: Podiatry;  Laterality: Left;    DEBRIDEMENT OF MULTIPLE METATARSAL BONES Left 11/5/2018    Procedure: DEBRIDEMENT, METATARSAL BONE, 2 OR MORE BONES;  Surgeon: Karla Wheeelr DPM;  Location: [facility] OR;  Service: Podiatry;  Laterality: Left;    EXCISION OF SKIN Left 9/27/2019    Procedure: EXCISION, SKIN;  Surgeon: Lenard Alarcon MD;  Location: [address];  Service: Plastics;  Laterality: Left;  Plastics set, NIMS monitor, [employer]    FOOT AMPUTATION THROUGH METATARSAL Left 9/21/2018    Procedure: AMPUTATION, FOOT, TRANSMETATARSAL;  Surgeon: Karla Wheeler DPM;  Location: [facility] OR;  Service: Podiatry;  Laterality: Left;    FOOT AMPUTATION THROUGH METATARSAL Left 10/31/2018    Procedure: AMPUTATION, FOOT, TRANSMETATARSAL;  Surgeon: Karla Wheeler DPM;  Location: [facility] OR;  Service: Podiatry;  Laterality: Left;    FOOT AMPUTATION THROUGH METATARSAL Left 11/5/2018    Procedure: AMPUTATION, FOOT, TRANSMETATARSAL;  Surgeon: Karla Wheeler DPM;  Location: [facility] OR;  Service: Podiatry;  Laterality: Left;  revisional transmetatarsal amputation, Left foot    IRRIGATION AND DEBRIDEMENT OF LOWER EXTREMITY Left 10/31/2018     Procedure: IRRIGATION AND DEBRIDEMENT, LOWER EXTREMITY;  Surgeon: Karla Wheeler DPM;  Location: [facility] OR;  Service: Podiatry;  Laterality: Left;    KIDNEY TRANSPLANT  05/21/2016    LEFT HEART CATHETERIZATION Left 7/21/2019    Procedure: CATHETERIZATION, HEART, LEFT;  Surgeon: Andrew Valdez MD;  Location: [facility] CATH LAB;  Service: Cardiology;  Laterality: Left;    LEG AMPUTATION THROUGH KNEE  2011    right LE, started as nail puncture leading to diabetic ulcer    SKIN FULL THICKNESS GRAFT Left 10/7/2019    Procedure: APPLICATION, GRAFT, SKIN, FULL-THICKNESS;  Surgeon: Lenard Alarcon MD;  Location: [address];  Service: Plastics;  Laterality: Left;    SURGICAL REMOVAL OF LESION OF FACE Right 10/7/2019    Procedure: EXCISION, LESION, FACE;  Surgeon: Lenard Alarcon MD;  Location: [facility] OR [address] [place] [address];  Service: Plastics;  Laterality: Right;       Review of patient's allergies indicates:  No Known Allergies    No current facility-administered medications on file prior to encounter.     Current Outpatient Medications on File Prior to Encounter   Medication Sig    amLODIPine (NORVASC) 10 MG tablet Take 1 tablet (10 mg total) by mouth once daily.    aspirin (ECOTRIN) 81 MG EC tablet Take 1 tablet (81 mg total) by mouth once daily.    atorvastatin (LIPITOR) 80 MG tablet Take 1 tablet (80 mg total) by mouth once daily.    clopidogreL (PLAVIX) 75 mg tablet Take 1 tablet (75 mg total) by mouth once daily.    ergocalciferol (ERGOCALCIFEROL) 50,000 unit Cap Take 1 capsule (50,000 Units total) by mouth every 7 days. Take on Mondays    furosemide (LASIX) 40 MG tablet Take 1 tablet (40 mg total) by mouth daily as needed (Leg swelling, Nocturnal SOB).    gabapentin (NEURONTIN) 300 MG capsule Take 300 mg by mouth 3 (three) times daily.    HYDROcodone-acetaminophen (NORCO)  mg per tablet 1 tablet by Per G Tube route every 6 (six) hours as needed for Pain.    hydrocortisone 2.5 % cream Apply topically 2 (two)  times daily.    insulin aspart U-100 (NOVOLOG) 100 unit/mL (3 mL) InPn pen Inject 5 units three times a day with meal if BG > 300.    levalbuterol (XOPENEX) 1.25 mg/3 mL nebulizer solution Take 3 mLs (1.25 mg total) by nebulization every 6 to 8 hours as needed for Wheezing or Shortness of Breath.    LIDOcaine (LIDODERM) 5 % Place 1 patch onto the skin daily as needed. 12 hours on and 12 hours off    metoprolol tartrate (LOPRESSOR) 25 MG tablet Take 1 tablet (25 mg total) by mouth 2 (two) times daily.    mycophenolate (CELLCEPT) 250 mg Cap Take 1 capsule (250 mg total) by mouth 2 (two) times daily.    nitroGLYCERIN (NITROSTAT) 0.4 MG SL tablet Place 1 tablet (0.4 mg total) under the tongue every 5 (five) minutes as needed.    ondansetron (ZOFRAN-ODT) 4 MG TbDL Take 1 tablet (4 mg total) by mouth every 8 (eight) hours as needed.    sevelamer carbonate (RENVELA) 800 mg Tab Take 800 mg by mouth 3 (three) times daily with meals.    tacrolimus (PROGRAF) 0.5 MG Cap Take 2 capsules (1 mg total) by mouth every 12 (twelve) hours.    cloNIDine (CATAPRES) 0.1 MG tablet Take 1 tablet (0.1 mg total) by mouth 3 (three) times daily. (Patient not taking: Reported on 8/4/2022)    flash glucose scanning reader (FREESTYLE BRYAN 14 DAY READER) Misc 1 Device by Misc.(Non-Drug; Combo Route) route as needed.    flash glucose sensor (FREESTYLE BRYAN 14 DAY SENSOR) Kit 1 kit by Misc.(Non-Drug; Combo Route) route every 14 (fourteen) days.    ipratropium (ATROVENT) 0.02 % nebulizer solution Take 2.5 mLs (500 mcg total) by nebulization 4 (four) times daily. (Patient not taking: Reported on 8/4/2022)    isosorbide mononitrate (IMDUR) 30 MG 24 hr tablet Take 2 tablets (60 mg total) by mouth once daily. (Patient not taking: Reported on 8/4/2022)    ketoconazole (NIZORAL) 2 % cream Apply topically 2 (two) times daily.    linaCLOtide (LINZESS) 72 mcg Cap capsule Take 1 capsule (72 mcg total) by mouth before breakfast.    naloxone  (NARCAN) 4 mg/actuation Spry 1 spray by Nasal route once.    semaglutide (OZEMPIC) 1 mg/dose (2 mg/1.5 mL) PnIj Inject 1 mg under the skin every 7 days. (Patient taking differently: ())    sertraline (ZOLOFT) 25 MG tablet Take 1 tablet (25 mg total) by mouth once daily. For depression and anxiety (Patient not taking: Reported on 2022)    VIOS AEROSOL DELIVERY SYSTEM [place] USE Q 4 H PRN     Family History       Problem Relation (Age of Onset)    Cancer Father    Diabetes Father    Heart failure Father, Mother    Stroke Father          Tobacco Use    Smoking status: Former Smoker     Packs/day: 1.00     Years: 40.00     Pack years: 40.00     Quit date: 2013     Years since quittin.5    Smokeless tobacco: Never Used   Substance and Sexual Activity    Alcohol use: Yes     Alcohol/week: 6.0 standard drinks     Types: 6 Cans of beer per week     Comment: seldom    Drug use: No    Sexual activity: Never     Review of Systems   Constitutional:  Negative for fatigue and fever.   HENT:  Negative for sinus pressure.    Eyes:  Negative for visual disturbance.   Respiratory:  Negative for shortness of breath.    Cardiovascular:  Negative for chest pain.   Gastrointestinal:  Negative for vomiting.   Genitourinary:  Negative for difficulty urinating.   Musculoskeletal:  Positive for arthralgias, back pain and gait problem.   Skin:  Negative for rash.   Neurological:  Negative for headaches.   Psychiatric/Behavioral:  Negative for confusion.    Objective:     Vital Signs (Most Recent):  Temp: 98.7 °F (37.1 °C) (22 1615)  Pulse: 72 (22 1615)  Resp: 16 (22 1615)  BP: 135/67 (22 1615)  SpO2: 98 % (22 1615)   Vital Signs (24h Range):  Temp:  [98.1 °F (36.7 °C)-98.8 °F (37.1 °C)] 98.7 °F (37.1 °C)  Pulse:  [67-74] 72  Resp:  [16-20] 16  SpO2:  [93 %-98 %] 98 %  BP: (124-190)/(52-81) 135/67     Weight: 114.7 kg (252 lb 13.9 oz)  Body mass index is 35.27 kg/m².    Physical  Exam  Constitutional:       General: He is not in acute distress.     Appearance: He is well-developed. He is obese. He is not diaphoretic.   HENT:      Head: Normocephalic and atraumatic.   Eyes:      Pupils: Pupils are equal, round, and reactive to light.   Cardiovascular:      Rate and Rhythm: Normal rate and regular rhythm.      Heart sounds: Normal heart sounds. No murmur heard.    No friction rub. No gallop.   Pulmonary:      Effort: Pulmonary effort is normal. No respiratory distress.      Breath sounds: Normal breath sounds. No stridor. No wheezing or rales.   Abdominal:      General: Bowel sounds are normal. There is no distension.      Palpations: Abdomen is soft. There is no mass.      Tenderness: There is no abdominal tenderness. There is no guarding.   Musculoskeletal:      Comments: Right bka, left transmetatarsal amputation, external fixator LLE   Skin:     General: Skin is warm.      Findings: No erythema.   Neurological:      Mental Status: He is alert and oriented to person, place, and time.         CRANIAL NERVES     CN III, IV, VI   Pupils are equal, round, and reactive to light.     Significant Labs:   Results for orders placed or performed during the hospital encounter of 08/04/22   CBC auto differential   Result Value Ref Range    WBC 5.41 3.90 - 12.70 K/uL    RBC 4.28 (L) 4.60 - 6.20 M/uL    Hemoglobin 13.0 (L) 14.0 - 18.0 g/dL    Hematocrit 42.6 40.0 - 54.0 %     (H) 82 - 98 fL    MCH 30.4 27.0 - 31.0 pg    MCHC 30.5 (L) 32.0 - 36.0 g/dL    RDW 13.5 11.5 - 14.5 %    Platelets 201 150 - 450 K/uL    MPV 9.2 9.2 - 12.9 fL    Immature Granulocytes 0.4 0.0 - 0.5 %    Gran # (ANC) 3.7 1.8 - 7.7 K/uL    Immature Grans (Abs) 0.02 0.00 - 0.04 K/uL    Lymph # 1.0 1.0 - 4.8 K/uL    Mono # 0.6 0.3 - 1.0 K/uL    Eos # 0.0 0.0 - 0.5 K/uL    Baso # 0.02 0.00 - 0.20 K/uL    nRBC 0 0 /100 WBC    Gran % 69.1 38.0 - 73.0 %    Lymph % 18.1 18.0 - 48.0 %    Mono % 11.8 4.0 - 15.0 %    Eosinophil % 0.2 0.0  - 8.0 %    Basophil % 0.4 0.0 - 1.9 %    Differential Method Automated    Comprehensive metabolic panel   Result Value Ref Range    Sodium 136 136 - 145 mmol/L    Potassium 4.9 3.5 - 5.1 mmol/L    Chloride 102 95 - 110 mmol/L    CO2 24 23 - 29 mmol/L    Glucose 250 (H) 70 - 110 mg/dL    BUN 30 (H) 8 - 23 mg/dL    Creatinine 1.8 (H) 0.5 - 1.4 mg/dL    Calcium 8.4 (L) 8.7 - 10.5 mg/dL    Total Protein 6.7 6.0 - 8.4 g/dL    Albumin 3.0 (L) 3.5 - 5.2 g/dL    Total Bilirubin 0.5 0.1 - 1.0 mg/dL    Alkaline Phosphatase 156 (H) 55 - 135 U/L     (H) 10 - 40 U/L    ALT 73 (H) 10 - 44 U/L    Anion Gap 10 8 - 16 mmol/L    eGFR 40 (A) >60 mL/min/1.73 m^2   COVID-19 Rapid Screening   Result Value Ref Range    SARS-CoV-2 RNA, Amplification, Qual Negative Negative   Hemoglobin A1c if not done in past 3 months   Result Value Ref Range    Hemoglobin A1C 7.4 (H) 4.0 - 5.6 %    Estimated Avg Glucose 166 (H) 68 - 131 mg/dL   CBC Auto Differential   Result Value Ref Range    WBC 4.67 3.90 - 12.70 K/uL    RBC 3.76 (L) 4.60 - 6.20 M/uL    Hemoglobin 11.7 (L) 14.0 - 18.0 g/dL    Hematocrit 37.4 (L) 40.0 - 54.0 %     (H) 82 - 98 fL    MCH 31.1 (H) 27.0 - 31.0 pg    MCHC 31.3 (L) 32.0 - 36.0 g/dL    RDW 13.4 11.5 - 14.5 %    Platelets 155 150 - 450 K/uL    MPV 9.4 9.2 - 12.9 fL    Immature Granulocytes 0.2 0.0 - 0.5 %    Gran # (ANC) 3.0 1.8 - 7.7 K/uL    Immature Grans (Abs) 0.01 0.00 - 0.04 K/uL    Lymph # 1.0 1.0 - 4.8 K/uL    Mono # 0.7 0.3 - 1.0 K/uL    Eos # 0.0 0.0 - 0.5 K/uL    Baso # 0.01 0.00 - 0.20 K/uL    nRBC 0 0 /100 WBC    Gran % 64.5 38.0 - 73.0 %    Lymph % 21.0 18.0 - 48.0 %    Mono % 13.9 4.0 - 15.0 %    Eosinophil % 0.2 0.0 - 8.0 %    Basophil % 0.2 0.0 - 1.9 %    Differential Method Automated    Basic Metabolic Panel   Result Value Ref Range    Sodium 135 (L) 136 - 145 mmol/L    Potassium 4.2 3.5 - 5.1 mmol/L    Chloride 105 95 - 110 mmol/L    CO2 21 (L) 23 - 29 mmol/L    Glucose 159 (H) 70 - 110 mg/dL     BUN 26 (H) 8 - 23 mg/dL    Creatinine 1.5 (H) 0.5 - 1.4 mg/dL    Calcium 7.7 (L) 8.7 - 10.5 mg/dL    Anion Gap 9 8 - 16 mmol/L    eGFR 50 (A) >60 mL/min/1.73 m^2   Magnesium   Result Value Ref Range    Magnesium 1.8 1.6 - 2.6 mg/dL   Tacrolimus level   Result Value Ref Range    Tacrolimus Lvl 2.0 (L) 5.0 - 15.0 ng/mL   Comprehensive Metabolic Panel   Result Value Ref Range    Sodium 135 (L) 136 - 145 mmol/L    Potassium 4.2 3.5 - 5.1 mmol/L    Chloride 105 95 - 110 mmol/L    CO2 23 23 - 29 mmol/L    Glucose 163 (H) 70 - 110 mg/dL    BUN 15 8 - 23 mg/dL    Creatinine 1.1 0.5 - 1.4 mg/dL    Calcium 7.8 (L) 8.7 - 10.5 mg/dL    Total Protein 5.3 (L) 6.0 - 8.4 g/dL    Albumin 2.3 (L) 3.5 - 5.2 g/dL    Total Bilirubin 0.7 0.1 - 1.0 mg/dL    Alkaline Phosphatase 120 55 - 135 U/L    AST 35 10 - 40 U/L    ALT 19 10 - 44 U/L    Anion Gap 7 (L) 8 - 16 mmol/L    eGFR >60 >60 mL/min/1.73 m^2   Tacrolimus level   Result Value Ref Range    Tacrolimus Lvl 6.4 5.0 - 15.0 ng/mL   Renal Function Panel   Result Value Ref Range    Glucose 146 (H) 70 - 110 mg/dL    Sodium 135 (L) 136 - 145 mmol/L    Potassium 4.2 3.5 - 5.1 mmol/L    Chloride 104 95 - 110 mmol/L    CO2 23 23 - 29 mmol/L    BUN 16 8 - 23 mg/dL    Calcium 7.9 (L) 8.7 - 10.5 mg/dL    Creatinine 1.2 0.5 - 1.4 mg/dL    Albumin 2.3 (L) 3.5 - 5.2 g/dL    Phosphorus 2.2 (L) 2.7 - 4.5 mg/dL    eGFR >60 >60 mL/min/1.73 m^2    Anion Gap 8 8 - 16 mmol/L   Magnesium   Result Value Ref Range    Magnesium 2.0 1.6 - 2.6 mg/dL   Renal Function Panel   Result Value Ref Range    Glucose 119 (H) 70 - 110 mg/dL    Sodium 135 (L) 136 - 145 mmol/L    Potassium 4.2 3.5 - 5.1 mmol/L    Chloride 103 95 - 110 mmol/L    CO2 24 23 - 29 mmol/L    BUN 14 8 - 23 mg/dL    Calcium 8.0 (L) 8.7 - 10.5 mg/dL    Creatinine 1.2 0.5 - 1.4 mg/dL    Albumin 2.3 (L) 3.5 - 5.2 g/dL    Phosphorus 2.2 (L) 2.7 - 4.5 mg/dL    eGFR >60 >60 mL/min/1.73 m^2    Anion Gap 8 8 - 16 mmol/L   Tacrolimus level   Result Value  Ref Range    Tacrolimus Lvl 6.6 5.0 - 15.0 ng/mL   Renal Function Panel   Result Value Ref Range    Glucose 144 (H) 70 - 110 mg/dL    Sodium 134 (L) 136 - 145 mmol/L    Potassium 4.3 3.5 - 5.1 mmol/L    Chloride 103 95 - 110 mmol/L    CO2 24 23 - 29 mmol/L    BUN 14 8 - 23 mg/dL    Calcium 8.1 (L) 8.7 - 10.5 mg/dL    Creatinine 1.1 0.5 - 1.4 mg/dL    Albumin 2.3 (L) 3.5 - 5.2 g/dL    Phosphorus 2.1 (L) 2.7 - 4.5 mg/dL    eGFR >60 >60 mL/min/1.73 m^2    Anion Gap 7 (L) 8 - 16 mmol/L   Echo Saline Bubble? No   Result Value Ref Range    BSA 2.27 m2    TDI SEPTAL 0.09 m/s    LV LATERAL E/E' RATIO 8.45 m/s    LV SEPTAL E/E' RATIO 10.33 m/s    LA WIDTH 3.60 cm    IVC diameter 1.30 cm    Left Ventricular Outflow Tract Mean Velocity 0.93113697613 cm/s    Left Ventricular Outflow Tract Mean Gradient 2.13 mmHg    TDI LATERAL 0.11 m/s    LVIDd 3.72 3.5 - 6.0 cm    IVS 1.59 (A) 0.6 - 1.1 cm    Posterior Wall 1.78 (A) 0.6 - 1.1 cm    Ao root annulus 3.36 cm    LVIDs 2.45 2.1 - 4.0 cm    FS 34 28 - 44 %    LA volume 57.29 cm3    Sinus 3.49 cm    STJ 3.49 cm    Ascending aorta 3.28 cm    LV mass 254.30 g    LA size 3.62 cm    Left Ventricle Relative Wall Thickness 0.96 cm    AV mean gradient 3 mmHg    AV valve area 2.80 cm2    AV Velocity Ratio 0.75     AV index (prosthetic) 0.75     MV valve area p 1/2 method 3.53 cm2    E/A ratio 0.81     Mean e' 0.10 m/s    E wave deceleration time 215.216431860166089 msec    IVRT 82.940698579 msec    LVOT diameter 2.18 cm    LVOT area 3.7 cm2    LVOT peak honorio 0.84 m/s    LVOT peak VTI 18.60 cm    Ao peak honorio 1.12 m/s    Ao VTI 24.8 cm    RVOT peak honorio 0.91 m/s    RVOT peak VTI 18.1 cm    LVOT stroke volume 69.39 cm3    AV peak gradient 5 mmHg    PV mean gradient 2.63 mmHg    E/E' ratio 9.30 m/s    MV Peak E Honorio 0.93 m/s    TR Max Honorio 2.53 m/s    MV stenosis pressure 1/2 time [id number] ms    MV Peak A Honorio 1.15 m/s    LV Systolic Volume 21.24 mL    LV Systolic Volume Index 9.6 mL/m2     LV Diastolic Volume 58.87 mL    LV Diastolic Volume Index 26.52 mL/m2    LA Volume Index 25.8 mL/m2    LV Mass Index 115 g/m2    RA Major Axis 4.42 cm    Left Atrium Minor Axis 4.79 cm    Left Atrium Major Axis 5.62 cm    Triscuspid Valve Regurgitation Peak Gradient 26 mmHg    RA Width 2.57 cm    Right Atrial Pressure (from IVC) 3 mmHg    EF 60 %    TV rest pulmonary artery pressure 29 mmHg   POCT glucose   Result Value Ref Range    POCT Glucose 263 (H) 70 - 110 mg/dL   POCT glucose   Result Value Ref Range    POCT Glucose 264 (H) 70 - 110 mg/dL   POCT glucose   Result Value Ref Range    POCT Glucose 192 (H) 70 - 110 mg/dL   POCT glucose   Result Value Ref Range    POCT Glucose 210 (H) 70 - 110 mg/dL   POCT glucose   Result Value Ref Range    POCT Glucose 157 (H) 70 - 110 mg/dL   POCT glucose   Result Value Ref Range    POCT Glucose 178 (H) 70 - 110 mg/dL   POCT glucose   Result Value Ref Range    POCT Glucose 193 (H) 70 - 110 mg/dL   POCT glucose   Result Value Ref Range    POCT Glucose 189 (H) 70 - 110 mg/dL   POCT glucose   Result Value Ref Range    POCT Glucose 183 (H) 70 - 110 mg/dL   POCT glucose   Result Value Ref Range    POCT Glucose 162 (H) 70 - 110 mg/dL   POCT glucose   Result Value Ref Range    POCT Glucose 160 (H) 70 - 110 mg/dL   POCT glucose   Result Value Ref Range    POCT Glucose 152 (H) 70 - 110 mg/dL   POCT glucose   Result Value Ref Range    POCT Glucose 143 (H) 70 - 110 mg/dL   POCT glucose   Result Value Ref Range    POCT Glucose 163 (H) 70 - 110 mg/dL   POCT glucose   Result Value Ref Range    POCT Glucose 166 (H) 70 - 110 mg/dL   POCT glucose   Result Value Ref Range    POCT Glucose 150 (H) 70 - 110 mg/dL   POCT glucose   Result Value Ref Range    POCT Glucose 123 (H) 70 - 110 mg/dL   POCT glucose   Result Value Ref Range    POCT Glucose 165 (H) 70 - 110 mg/dL   POCT glucose   Result Value Ref Range    POCT Glucose 188 (H) 70 - 110 mg/dL   POCT glucose   Result Value Ref Range    POCT  Glucose 183 (H) 70 - 110 mg/dL   POCT glucose   Result Value Ref Range    POCT Glucose 145 (H) 70 - 110 mg/dL   POCT glucose   Result Value Ref Range    POCT Glucose 210 (H) 70 - 110 mg/dL   POCT glucose   Result Value Ref Range    POCT Glucose 234 (H) 70 - 110 mg/dL     *Note: Due to a large number of results and/or encounters for the requested time period, some results have not been displayed. A complete set of results can be found in Results Review.        Significant Imaging: X-Ray Ankle Complete Left  Narrative: EXAMINATION:  XR ANKLE COMPLETE 3 VIEW LEFT    CLINICAL HISTORY:  intraop;    COMPARISON:  None  Impression: FINDINGS/  Intraoperative fluoroscopic images were obtained.    Total fluoro time was 1.4 minute and 2 fluoroscopic images were obtained.  See operative report.    Electronically signed by: Ryan Joya MD  Date:    08/04/2022  Time:    15:55  SURG [place] Surgery Fluoro Usage  See OP Notes for results.     IMPRESSION: See OP Notes for results.     This procedure was auto-finalized by: Virtual [employer]i[facility]  Echo Saline Bubble? No  · The left ventricle is normal in size with moderate concentric   hypertrophy and normal systolic function.  · The estimated ejection fraction is 60%.  · Normal left ventricular diastolic function.  · Normal right ventricular size with normal right ventricular systolic   function.  · Mild tricuspid regurgitation.  · Normal central venous pressure (3 mmHg).  · The estimated PA systolic pressure is 29 mmHg.     X-Ray Chest AP Portable  Narrative: EXAMINATION:  XR CHEST AP PORTABLE    CLINICAL HISTORY:  Encounter for other preprocedural examination    FINDINGS:  Single view of the chest.  08/15/2021 comparison    Cardiac silhouette is normal.  Aorta demonstrates atherosclerotic disease. The lungs demonstrate no evidence of active disease.  Mild bibasilar atelectasis.  No evidence of pleural effusion or pneumothorax.  Bones appear intact demonstrate scattered degenerative  change.  Impression: No acute process seen.    Electronically signed by: Ryan Joya MD  Date:    08/04/2022  Time:    07:14  X-Ray Tibia Fibula 2 View Left  Narrative: EXAMINATION:  XR TIBIA FIBULA 2 VIEW LEFT; XR ANKLE COMPLETE 3 VIEW LEFT    CLINICAL HISTORY:  XR TIBIA FIBULA 2 VIEW LEFT; XR ANKLE COMPLETE 3 VIEW LEFTUnspecified fall, initial encounter    COMPARISON:  None    FINDINGS:  Four views of the left tibia/fibula and three views of the left ankle were obtained.    Comminuted tibial metaphyseal fracture with angulation and mild displacement.  There is extension to the medial articular surface and involvement of the interosseous membrane between the tibia and fibula.  Transverse fracture of the distal fibula above the lateral malleolus with mild displacement and angulation.  Mildly comminuted proximal fibular fracture.  Moderate joint effusion of the ankle.  Small avulsion injury at the tip of the medial malleolus    Diffuse osteopenia and moderate soft tissue swelling.  Dense vascular calcifications.  Moderate degenerative changes of the ankle and knee joints.  Advanced degenerative changes within the midfoot.  Large plantar calcaneal spur.  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  Impression: See above.    Electronically signed by: Ryan Joya MD  Date:    08/04/2022  Time:    06:59  X-Ray Ankle Complete Left  Narrative: EXAMINATION:  XR TIBIA FIBULA 2 VIEW LEFT; XR ANKLE COMPLETE 3 VIEW LEFT    CLINICAL HISTORY:  XR TIBIA FIBULA 2 VIEW LEFT; XR ANKLE COMPLETE 3 VIEW LEFTUnspecified fall, initial encounter    COMPARISON:  None    FINDINGS:  Four views of the left tibia/fibula and three views of the left ankle were obtained.    Comminuted tibial metaphyseal fracture with angulation and mild displacement.  There is extension to the medial articular surface and involvement of the interosseous membrane between the tibia and fibula.  Transverse fracture of the  distal fibula above the lateral malleolus with mild displacement and angulation.  Mildly comminuted proximal fibular fracture.  Moderate joint effusion of the ankle.  Small avulsion injury at the tip of the medial malleolus    Diffuse osteopenia and moderate soft tissue swelling.  Dense vascular calcifications.  Moderate degenerative changes of the ankle and knee joints.  Advanced degenerative changes within the midfoot.  Large plantar calcaneal spur.  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  Impression: See above.    Electronically signed by: Ryan Joya MD  Date:    08/04/2022  Time:    06:59

## 2022-08-10 NOTE — ASSESSMENT & PLAN NOTE
Patient's FSGs are uncontrolled due to hyperglycemia on current medication regimen.  Last A1c reviewed-   Lab Results   Component Value Date    HGBA1C 7.4 (H) 08/04/2022     Most recent fingerstick glucose reviewed-   Recent Labs   Lab 08/09/22  2022 08/10/22  0514 08/10/22  1156 08/10/22  1705   POCTGLUCOSE 195* 184* 191* 209*     Current correctional scale  High  Maintain anti-hyperglycemic dose as follows-   Antihyperglycemics (From admission, onward)            Start     Stop Route Frequency Ordered    08/04/22 0845  insulin aspart U-100 pen 1-10 Units         -- SubQ Every 6 hours PRN 08/04/22 0745        Hold Oral hypoglycemics while patient is in the hospital.

## 2022-08-10 NOTE — CONSULTS
[facility]  Nephrology  Consult Note    Patient Name: Lavelle Ladd  MRN: [id number]  Admission Date: 8/4/2022  Hospital Length of Stay: 6 days  Attending Provider: Gabi Mathias MD   Primary Care Physician: Yahir Calzada MD  Principal Problem:Closed fracture of left tibia and fibula    Consults  Subjective:     HPI:  67-year-old male who is admitted after falling and sustaining a lower extremity fracture.  Now status post external fixation.  Has history of kidney transplant.  Nephrology has been consulted for assistance with management of immunosuppression and kidney transplant.  The patient was seen in his hospital room.  In bed resting comfortably.  No acute distress noted.  He underwent kidney transplant in May of 2016. He states that he has not seen a general nephrologist or a transplant nephrologist in almost 2 years.  He states that no one has been following his Prograf levels.  He does not remember his home dose of Prograf.    08/08/2022:  Patient was seen in his hospital room.  In bed resting comfortably.  No acute distress noted.  Discussed nephrology related issues with he and his sister.  All nephrology related questions were answered to their satisfaction.    Review of systems:  No shortness of breath or chest discomfort.  No fevers or chills.  No nausea vomiting diarrhea.    08/09/2022:  Patient was seen in his hospital room.  In bed resting comfortably.  No acute distress noted.  No new complaints from overnight.    08/10/2022:  Patient was seen in his hospital room.  Reports no events from overnight.  No new complaints this morning.  His sister was at the bedside.  All nephrology related questions were answered to their satisfaction.    Past Medical History:   Diagnosis Date    DINORAH (acute kidney injury) 9/25/2016    Arthritis     CAD in native artery 7/21/2019    CHF (congestive heart failure)     Chronic obstructive pulmonary disease 9/12/2016    Coronary artery disease involving  native coronary artery of native heart without angina pectoris 2016    CRI (chronic renal insufficiency) 2019     donor kidney transplant for DM 16     Induction with Thymo x3 and IV solumedrol to total 875mg  Kidney Biopsy  2016: 16 glomeruli, ACR type 1 AVR type 2, significant microcirculatory changes, c4d negative, No DSA, 5 to10% fibrosis. Treated with thymo x8 2016- no rejection      Diastolic heart failure     Encounter for blood transfusion     ESRD on RRT since 10/2013 10/29/2013    Biopsy proven diabetic nephropathy and lymphoplasmacytic interstitial infiltrate not c/w with AIN (ddx sjogrens or assoc with tamm-horsefall protein extravasation)     GERD (gastroesophageal reflux disease)     History of hepatitis C, s/p successful Rx w/ SVR12 - 2017    Completed 12 weeks harvoni w/ SVR    Hyperlipidemia     Hypertension     PAD (peripheral artery disease) 2019    PIC line (peripherally inserted central catheter) flush     Prophylactic immunotherapy     Proteinuria     PVD (peripheral vascular disease) 2017    RLE BKA CT 16 Extensive atherosclerotic disease of the aorta and mesenteric arteries.     Renal hypertension     Type 2 diabetes mellitus with diabetic neuropathy, with long-term current use of insulin 2016    Vitamin B12 deficiency        Past Surgical History:   Procedure Laterality Date    AORTOGRAPHY WITH SERIALOGRAPHY N/A 2018    Procedure: LEFT LEG ANGIOGRAM;  Surgeon: Donal Mcdonald MD;  Location: [facility] CATH LAB;  Service: Vascular;  Laterality: N/A;    APPLICATION OF LARGE EXTERNAL FIXATION DEVICE TO TIBIA Left 2022    Procedure: APPLICATION, EXTERNAL FIXATION DEVICE, LARGE, TIBIA;  Surgeon: Good Villa MD;  Location: [facility] OR;  Service: Orthopedics;  Laterality: Left;    av bovine graft      Left UE    AV FISTULA PLACEMENT      left UE    CARDIAC CATHETERIZATION  2015    CLOSED REDUCTION OF INJURY  OF TIBIA Left 8/4/2022    Procedure: CLOSED REDUCTION, TIBIA;  Surgeon: Good Villa MD;  Location: [facility] OR;  Service: Orthopedics;  Laterality: Left;  Closed reduction left tibial and fibular shaft fractures    CLOSURE OF WOUND Left 9/24/2018    Procedure: CLOSURE, WOUND;  Surgeon: Karla Wheeler DPM;  Location: [facility] OR;  Service: Podiatry;  Laterality: Left;  Secondary Wound closure, extensive    CLOSURE OF WOUND Left 11/5/2018    Procedure: CLOSURE, WOUND;  Surgeon: Karla Wheeler DPM;  Location: [facility] OR;  Service: Podiatry;  Laterality: Left;    DEBRIDEMENT OF MULTIPLE METATARSAL BONES Left 11/5/2018    Procedure: DEBRIDEMENT, METATARSAL BONE, 2 OR MORE BONES;  Surgeon: Karla Wheeler DPM;  Location: [facility] OR;  Service: Podiatry;  Laterality: Left;    EXCISION OF SKIN Left 9/27/2019    Procedure: EXCISION, SKIN;  Surgeon: Lenard Alarcon MD;  Location: [address];  Service: Plastics;  Laterality: Left;  Plastics set, NIMS monitor, [employer]    FOOT AMPUTATION THROUGH METATARSAL Left 9/21/2018    Procedure: AMPUTATION, FOOT, TRANSMETATARSAL;  Surgeon: Karla Wheeler DPM;  Location: [facility] OR;  Service: Podiatry;  Laterality: Left;    FOOT AMPUTATION THROUGH METATARSAL Left 10/31/2018    Procedure: AMPUTATION, FOOT, TRANSMETATARSAL;  Surgeon: Karla Wheeler DPM;  Location: [facility] OR;  Service: Podiatry;  Laterality: Left;    FOOT AMPUTATION THROUGH METATARSAL Left 11/5/2018    Procedure: AMPUTATION, FOOT, TRANSMETATARSAL;  Surgeon: Karla Wheeler DPM;  Location: [facility] OR;  Service: Podiatry;  Laterality: Left;  revisional transmetatarsal amputation, Left foot    IRRIGATION AND DEBRIDEMENT OF LOWER EXTREMITY Left 10/31/2018    Procedure: IRRIGATION AND DEBRIDEMENT, LOWER EXTREMITY;  Surgeon: Karla Wheeler DPM;  Location: [facility] OR;  Service: Podiatry;  Laterality: Left;    KIDNEY TRANSPLANT  05/21/2016    LEFT HEART CATHETERIZATION Left 7/21/2019    Procedure:  CATHETERIZATION, HEART, LEFT;  Surgeon: Andrew Valdez MD;  Location: [facility] CATH LAB;  Service: Cardiology;  Laterality: Left;    LEG AMPUTATION THROUGH KNEE  2011    right LE, started as nail puncture leading to diabetic ulcer    SKIN FULL THICKNESS GRAFT Left 10/7/2019    Procedure: APPLICATION, GRAFT, SKIN, FULL-THICKNESS;  Surgeon: Lenard Alarcon MD;  Location: [facility] OR [address] [place] [address];  Service: Plastics;  Laterality: Left;    SURGICAL REMOVAL OF LESION OF FACE Right 10/7/2019    Procedure: EXCISION, LESION, FACE;  Surgeon: Lenard Alarcon MD;  Location: [facility] OR [address] [place] [address];  Service: Plastics;  Laterality: Right;       Review of patient's allergies indicates:  No Known Allergies  Current Facility-Administered Medications   Medication Frequency    acetaminophen tablet 650 mg Q4H PRN    albuterol-ipratropium 2.5 mg-0.5 mg/3 mL nebulizer solution 3 mL Q4H PRN    aluminum-magnesium hydroxide-simethicone 200-200-20 mg/5 mL suspension 30 mL Q6H PRN    amLODIPine tablet 10 mg Daily    apixaban tablet 2.5 mg BID    atorvastatin tablet 80 mg Daily    benzonatate capsule 100 mg TID PRN    collagenase ointment Daily    dextrose 10% bolus 125 mL PRN    dextrose 10% bolus 250 mL PRN    fluticasone propionate 50 mcg/actuation nasal spray 100 mcg Daily    glucagon (human recombinant) injection 1 mg PRN    guaiFENesin 100 mg/5 ml syrup 200 mg Q4H PRN    hydrALAZINE injection 10 mg Q8H PRN    insulin aspart U-100 pen 1-10 Units Q6H PRN    melatonin tablet 6 mg Nightly PRN    methocarbamoL tablet 500 mg QID    metoprolol tartrate (LOPRESSOR) tablet 25 mg BID    morphine injection 2 mg Q2H PRN    mupirocin 2 % ointment Daily    ondansetron disintegrating tablet 8 mg Q8H PRN    oxyCODONE-acetaminophen  mg per tablet 1 tablet Q4H PRN    sertraline tablet 25 mg Daily    sodium chloride 0.9% flush 10 mL PRN    sodium chloride 0.9% flush 10 mL PRN    sodium chloride 0.9% flush 10 mL PRN    sodium hypochlorite  0.125% external solution BID    tacrolimus capsule 1 mg Daily PM    tacrolimus capsule 1.5 mg Daily AM     Family History     Problem Relation (Age of Onset)    Cancer Father    Diabetes Father    Heart failure Father, Mother    Stroke Father        Tobacco Use    Smoking status: Former Smoker     Packs/day: 1.00     Years: 40.00     Pack years: 40.00     Quit date: 2013     Years since quittin.5    Smokeless tobacco: Never Used   Substance and Sexual Activity    Alcohol use: Yes     Alcohol/week: 6.0 standard drinks     Types: 6 Cans of beer per week     Comment: seldom    Drug use: No    Sexual activity: Never     Review of Systems   Constitutional: Negative.    HENT: Negative.    Eyes: Negative.    Respiratory: Negative.    Cardiovascular: Negative.    Gastrointestinal: Negative.    Genitourinary: Negative.    Musculoskeletal:        Discomfort in his left lower extremity has surgical site.   Skin: Negative.    Neurological: Negative.    Hematological: Negative.      Objective:     Vital Signs (Most Recent):  Temp: 98.1 °F (36.7 °C) (08/10/22 0830)  Pulse: 69 (08/10/22 0830)  Resp: 17 (08/10/22 0855)  BP: (!) 178/79 (08/10/22 0830)  SpO2: (!) 93 % (08/10/22 0830)  O2 Device (Oxygen Therapy): room air (22) Vital Signs (24h Range):  Temp:  [98.1 °F (36.7 °C)-99 °F (37.2 °C)] 98.1 °F (36.7 °C)  Pulse:  [69-73] 69  Resp:  [16-20] 17  SpO2:  [92 %-98 %] 93 %  BP: (125-178)/(55-79) 178/79     Weight: 114.7 kg (252 lb 13.9 oz) (22 0019)  Body mass index is 35.27 kg/m².  Body surface area is 2.4 meters squared.    I/O last 3 completed shifts:  In: 480 [P.O.:480]  Out: 1575 [Urine:1575]    Physical Exam  Constitutional:       Appearance: Normal appearance.   HENT:      Head: Normocephalic and atraumatic.   Eyes:      General: No scleral icterus.     Extraocular Movements: Extraocular movements intact.      Pupils: Pupils are equal, round, and reactive to light.   Pulmonary:      Effort:  Pulmonary effort is normal.      Breath sounds: No stridor.   Musculoskeletal:      Comments: External fixation device left lower extremity.   Skin:     General: Skin is warm.   Neurological:      General: No focal deficit present.      Mental Status: He is alert and oriented to person, place, and time.   Psychiatric:         Mood and Affect: Mood normal.         Behavior: Behavior normal.         Significant Labs:  BMP:   Recent Labs   Lab 08/07/22  0515 08/08/22  0501 08/09/22  0633   *   < > 144*   *   < > 134*   K 4.2   < > 4.3      < > 103   CO2 23   < > 24   BUN 16   < > 14   CREATININE 1.2   < > 1.1   CALCIUM 7.9*   < > 8.1*   MG 2.0  --   --     < > = values in this interval not displayed.     CMP:   Recent Labs   Lab 08/06/22  1010 08/07/22  0515 08/09/22  0633   *   < > 144*   CALCIUM 7.8*   < > 8.1*   ALBUMIN 2.3*   < > 2.3*   PROT 5.3*  --   --    *   < > 134*   K 4.2   < > 4.3   CO2 23   < > 24      < > 103   BUN 15   < > 14   CREATININE 1.1   < > 1.1   ALKPHOS 120  --   --    ALT 19  --   --    AST 35  --   --    BILITOT 0.7  --   --     < > = values in this interval not displayed.     All labs within the past 24 hours have been reviewed.    Significant Imaging:  Labs: Reviewed  Reviewed    Assessment/Plan:     Active Diagnoses:    Diagnosis Date Noted POA    PRINCIPAL PROBLEM:  Closed fracture of left tibia and fibula [S82.202A, S82.402A] 08/04/2022 Yes    Pressure injury of left hip, stage 2 [L89.222] 08/05/2022 Yes    Severe central sleep apnea comorbid with prescribed opioid use [F11.90, G47.37] 01/29/2020 Yes    Kidney transplant recipient [Z94.0] 04/07/2017 Not Applicable    Type 2 diabetes mellitus with stable proliferative retinopathy of both eyes, with long-term current use of insulin [E11.3553, Z79.4] 12/01/2016 Not Applicable    Coronary artery disease of native artery of native heart with stable angina pectoris [I25.118] 07/01/2016 Yes     Osteoarthritis of lumbar spine [M47.816]  Yes    Essential hypertension [I10] 02/20/2015 Yes      Problems Resolved During this Admission:       1. Kidney transplant status:  Status post kidney transplant in May of 2016.  Cause of renal failure was diabetic nephropathy.  Creatinine remains stable at 1.1.  Continues nonoliguric with over 1375 cc urine output reported.    2. Immunosuppression:  Prograf level yesterday was 6.5.  Will plan to decrease Prograf to 1 mg twice a day.    Mycophenolate on hold secondary to lower extremity fracture.    Magnesium was stable 2.0.        Thank you for your consult.     Jose David Hooker MD  Nephrology  [facility]

## 2022-08-10 NOTE — CONSULTS
[facility] [facility] Surg  Nephrology  Consult Note    Patient Name: Lavelle Ladd  MRN: [id number]  Admission Date: 8/4/2022  Hospital Length of Stay: 6 days  Attending Provider: Gabi Mathias MD   Primary Care Physician: Yahir Calzada MD  Principal Problem:Closed fracture of left tibia and fibula    Consults  Subjective:     HPI:  67-year-old male who is admitted after falling and sustaining a lower extremity fracture.  Now status post external fixation.  Has history of kidney transplant.  Nephrology has been consulted for assistance with management of immunosuppression and kidney transplant.  The patient was seen in his hospital room.  In bed resting comfortably.  No acute distress noted.  He underwent kidney transplant in May of 2016. He states that he has not seen a general nephrologist or a transplant nephrologist in almost 2 years.  He states that no one has been following his Prograf levels.  He does not remember his home dose of Prograf.    08/08/2022:  Patient was seen in his hospital room.  In bed resting comfortably.  No acute distress noted.  Discussed nephrology related issues with he and his sister.  All nephrology related questions were answered to their satisfaction.    Review of systems:  No shortness of breath or chest discomfort.  No fevers or chills.  No nausea vomiting diarrhea.    08/09/2022:  Patient was seen in his hospital room.  In bed resting comfortably.  No acute distress noted.  No new complaints from overnight.    08/10/2022:  Patient was seen in his hospital room.  Reports no events from overnight.  No new complaints this morning.  His sister was at the bedside.  All nephrology related questions were answered to their satisfaction.    Past Medical History:   Diagnosis Date    DINORAH (acute kidney injury) 9/25/2016    Arthritis     CAD in native artery 7/21/2019    CHF (congestive heart failure)     Chronic obstructive pulmonary disease 9/12/2016    Coronary artery disease involving  native coronary artery of native heart without angina pectoris 2016    CRI (chronic renal insufficiency) 2019     donor kidney transplant for DM 16     Induction with Thymo x3 and IV solumedrol to total 875mg  Kidney Biopsy  2016: 16 glomeruli, ACR type 1 AVR type 2, significant microcirculatory changes, c4d negative, No DSA, 5 to10% fibrosis. Treated with thymo x8 2016- no rejection      Diastolic heart failure     Encounter for blood transfusion     ESRD on RRT since 10/2013 10/29/2013    Biopsy proven diabetic nephropathy and lymphoplasmacytic interstitial infiltrate not c/w with AIN (ddx sjogrens or assoc with tamm-horsefall protein extravasation)     GERD (gastroesophageal reflux disease)     History of hepatitis C, s/p successful Rx w/ SVR12 - 2017    Completed 12 weeks harvoni w/ SVR    Hyperlipidemia     Hypertension     PAD (peripheral artery disease) 2019    PIC line (peripherally inserted central catheter) flush     Prophylactic immunotherapy     Proteinuria     PVD (peripheral vascular disease) 2017    RLE BKA CT 16 Extensive atherosclerotic disease of the aorta and mesenteric arteries.     Renal hypertension     Type 2 diabetes mellitus with diabetic neuropathy, with long-term current use of insulin 2016    Vitamin B12 deficiency        Past Surgical History:   Procedure Laterality Date    AORTOGRAPHY WITH SERIALOGRAPHY N/A 2018    Procedure: LEFT LEG ANGIOGRAM;  Surgeon: Donal Mcdonald MD;  Location: [facility] CATH LAB;  Service: Vascular;  Laterality: N/A;    APPLICATION OF LARGE EXTERNAL FIXATION DEVICE TO TIBIA Left 2022    Procedure: APPLICATION, EXTERNAL FIXATION DEVICE, LARGE, TIBIA;  Surgeon: Good Villa MD;  Location: [facility] OR;  Service: Orthopedics;  Laterality: Left;    av bovine graft      Left UE    AV FISTULA PLACEMENT      left UE    CARDIAC CATHETERIZATION  2015    CLOSED REDUCTION OF INJURY  OF TIBIA Left 8/4/2022    Procedure: CLOSED REDUCTION, TIBIA;  Surgeon: Good Villa MD;  Location: [facility] OR;  Service: Orthopedics;  Laterality: Left;  Closed reduction left tibial and fibular shaft fractures    CLOSURE OF WOUND Left 9/24/2018    Procedure: CLOSURE, WOUND;  Surgeon: Karla Wheleer DPM;  Location: [facility] OR;  Service: Podiatry;  Laterality: Left;  Secondary Wound closure, extensive    CLOSURE OF WOUND Left 11/5/2018    Procedure: CLOSURE, WOUND;  Surgeon: Karla Wheeler DPM;  Location: [facility] OR;  Service: Podiatry;  Laterality: Left;    DEBRIDEMENT OF MULTIPLE METATARSAL BONES Left 11/5/2018    Procedure: DEBRIDEMENT, METATARSAL BONE, 2 OR MORE BONES;  Surgeon: Karla Wheeler DPM;  Location: [facility] OR;  Service: Podiatry;  Laterality: Left;    EXCISION OF SKIN Left 9/27/2019    Procedure: EXCISION, SKIN;  Surgeon: Lenard Alarcon MD;  Location: [address];  Service: Plastics;  Laterality: Left;  Plastics set, NIMS monitor, [employer]    FOOT AMPUTATION THROUGH METATARSAL Left 9/21/2018    Procedure: AMPUTATION, FOOT, TRANSMETATARSAL;  Surgeon: Karla Wheeler DPM;  Location: [facility] OR;  Service: Podiatry;  Laterality: Left;    FOOT AMPUTATION THROUGH METATARSAL Left 10/31/2018    Procedure: AMPUTATION, FOOT, TRANSMETATARSAL;  Surgeon: Karla Wheeler DPM;  Location: [facility] OR;  Service: Podiatry;  Laterality: Left;    FOOT AMPUTATION THROUGH METATARSAL Left 11/5/2018    Procedure: AMPUTATION, FOOT, TRANSMETATARSAL;  Surgeon: Karla Wheeler DPM;  Location: [facility] OR;  Service: Podiatry;  Laterality: Left;  revisional transmetatarsal amputation, Left foot    IRRIGATION AND DEBRIDEMENT OF LOWER EXTREMITY Left 10/31/2018    Procedure: IRRIGATION AND DEBRIDEMENT, LOWER EXTREMITY;  Surgeon: Karla Wheeler DPM;  Location: [facility] OR;  Service: Podiatry;  Laterality: Left;    KIDNEY TRANSPLANT  05/21/2016    LEFT HEART CATHETERIZATION Left 7/21/2019    Procedure:  CATHETERIZATION, HEART, LEFT;  Surgeon: Andrew Valdez MD;  Location: [facility] CATH LAB;  Service: Cardiology;  Laterality: Left;    LEG AMPUTATION THROUGH KNEE  2011    right LE, started as nail puncture leading to diabetic ulcer    SKIN FULL THICKNESS GRAFT Left 10/7/2019    Procedure: APPLICATION, GRAFT, SKIN, FULL-THICKNESS;  Surgeon: Lenard Alarcon MD;  Location: [facility] OR [address] [place] [address];  Service: Plastics;  Laterality: Left;    SURGICAL REMOVAL OF LESION OF FACE Right 10/7/2019    Procedure: EXCISION, LESION, FACE;  Surgeon: Lenard Alarcon MD;  Location: [facility] OR [address] [place] [address];  Service: Plastics;  Laterality: Right;       Review of patient's allergies indicates:  No Known Allergies  Current Facility-Administered Medications   Medication Frequency    acetaminophen tablet 650 mg Q4H PRN    albuterol-ipratropium 2.5 mg-0.5 mg/3 mL nebulizer solution 3 mL Q4H PRN    aluminum-magnesium hydroxide-simethicone 200-200-20 mg/5 mL suspension 30 mL Q6H PRN    amLODIPine tablet 10 mg Daily    apixaban tablet 2.5 mg BID    atorvastatin tablet 80 mg Daily    benzonatate capsule 100 mg TID PRN    collagenase ointment Daily    dextrose 10% bolus 125 mL PRN    dextrose 10% bolus 250 mL PRN    fluticasone propionate 50 mcg/actuation nasal spray 100 mcg Daily    glucagon (human recombinant) injection 1 mg PRN    guaiFENesin 100 mg/5 ml syrup 200 mg Q4H PRN    hydrALAZINE injection 10 mg Q8H PRN    insulin aspart U-100 pen 1-10 Units Q6H PRN    melatonin tablet 6 mg Nightly PRN    methocarbamoL tablet 500 mg QID    metoprolol tartrate (LOPRESSOR) tablet 25 mg BID    morphine injection 2 mg Q2H PRN    mupirocin 2 % ointment Daily    ondansetron disintegrating tablet 8 mg Q8H PRN    oxyCODONE-acetaminophen  mg per tablet 1 tablet Q4H PRN    sertraline tablet 25 mg Daily    sodium chloride 0.9% flush 10 mL PRN    sodium chloride 0.9% flush 10 mL PRN    sodium chloride 0.9% flush 10 mL PRN    sodium hypochlorite  0.125% external solution BID    tacrolimus capsule 1 mg Daily PM    [START ON 2022] tacrolimus capsule 1 mg Daily AM     Family History     Problem Relation (Age of Onset)    Cancer Father    Diabetes Father    Heart failure Father, Mother    Stroke Father        Tobacco Use    Smoking status: Former Smoker     Packs/day: 1.00     Years: 40.00     Pack years: 40.00     Quit date: 2013     Years since quittin.5    Smokeless tobacco: Never Used   Substance and Sexual Activity    Alcohol use: Yes     Alcohol/week: 6.0 standard drinks     Types: 6 Cans of beer per week     Comment: seldom    Drug use: No    Sexual activity: Never     Review of Systems   Constitutional: Negative.    HENT: Negative.    Eyes: Negative.    Respiratory: Negative.    Cardiovascular: Negative.    Gastrointestinal: Negative.    Genitourinary: Negative.    Musculoskeletal:        Discomfort in his left lower extremity has surgical site.   Skin: Negative.    Neurological: Negative.    Hematological: Negative.      Objective:     Vital Signs (Most Recent):  Temp: 98.1 °F (36.7 °C) (08/10/22 0830)  Pulse: 69 (08/10/22 0830)  Resp: 17 (08/10/22 0855)  BP: (!) 178/79 (08/10/22 08)  SpO2: (!) 93 % (08/10/22 0830)  O2 Device (Oxygen Therapy): room air (22) Vital Signs (24h Range):  Temp:  [98.1 °F (36.7 °C)-99 °F (37.2 °C)] 98.1 °F (36.7 °C)  Pulse:  [69-73] 69  Resp:  [16-20] 17  SpO2:  [92 %-98 %] 93 %  BP: (125-178)/(55-79) 178/79     Weight: 114.7 kg (252 lb 13.9 oz) (22 0019)  Body mass index is 35.27 kg/m².  Body surface area is 2.4 meters squared.    I/O last 3 completed shifts:  In: 480 [P.O.:480]  Out: 1575 [Urine:1575]    Physical Exam  Constitutional:       Appearance: Normal appearance.   HENT:      Head: Normocephalic and atraumatic.   Eyes:      General: No scleral icterus.     Extraocular Movements: Extraocular movements intact.      Pupils: Pupils are equal, round, and reactive to light.    Pulmonary:      Effort: Pulmonary effort is normal.      Breath sounds: No stridor.   Musculoskeletal:      Comments: External fixation device left lower extremity.   Skin:     General: Skin is warm.   Neurological:      General: No focal deficit present.      Mental Status: He is alert and oriented to person, place, and time.   Psychiatric:         Mood and Affect: Mood normal.         Behavior: Behavior normal.         Significant Labs:  BMP:   Recent Labs   Lab 08/07/22  0515 08/08/22  0501 08/09/22  0633   *   < > 144*   *   < > 134*   K 4.2   < > 4.3      < > 103   CO2 23   < > 24   BUN 16   < > 14   CREATININE 1.2   < > 1.1   CALCIUM 7.9*   < > 8.1*   MG 2.0  --   --     < > = values in this interval not displayed.     CMP:   Recent Labs   Lab 08/06/22  1010 08/07/22  0515 08/09/22  0633   *   < > 144*   CALCIUM 7.8*   < > 8.1*   ALBUMIN 2.3*   < > 2.3*   PROT 5.3*  --   --    *   < > 134*   K 4.2   < > 4.3   CO2 23   < > 24      < > 103   BUN 15   < > 14   CREATININE 1.1   < > 1.1   ALKPHOS 120  --   --    ALT 19  --   --    AST 35  --   --    BILITOT 0.7  --   --     < > = values in this interval not displayed.     All labs within the past 24 hours have been reviewed.    Significant Imaging:  Labs: Reviewed  Reviewed    Assessment/Plan:     Active Diagnoses:    Diagnosis Date Noted POA    PRINCIPAL PROBLEM:  Closed fracture of left tibia and fibula [S82.202A, S82.402A] 08/04/2022 Yes    Pressure injury of left hip, stage 2 [L89.222] 08/05/2022 Yes    Severe central sleep apnea comorbid with prescribed opioid use [F11.90, G47.37] 01/29/2020 Yes    Kidney transplant recipient [Z94.0] 04/07/2017 Not Applicable    Type 2 diabetes mellitus with stable proliferative retinopathy of both eyes, with long-term current use of insulin [E11.3553, Z79.4] 12/01/2016 Not Applicable    Coronary artery disease of native artery of native heart with stable angina pectoris [I25.118]  07/01/2016 Yes    Osteoarthritis of lumbar spine [M47.816]  Yes    Essential hypertension [I10] 02/20/2015 Yes      Problems Resolved During this Admission:       1. Kidney transplant status:  Status post kidney transplant in May of 2016.  Cause of renal failure was diabetic nephropathy.  Creatinine remains stable at 1.1.  Continues nonoliguric with over 1375 cc urine output reported.    2. Immunosuppression:  Prograf level yesterday was 6.5.  Will plan to decrease Prograf to 1 mg twice a day.    Will plan to check another Prograf level on Friday if he is still in the hospital.    Mycophenolate on hold secondary to lower extremity fracture.    Magnesium was stable 2.0.        Thank you for your consult.     Jose David Hooker MD  Nephrology  [facility] - Med Surg

## 2022-08-10 NOTE — PLAN OF CARE
Pt resting in bed, remains free of falls and injuries this shift. VSS. No obvious s/sx of distress noted. Pain controlled with PRN medications. Wound care performed as ordered. POC reviewed with the patient, verbalized understanding. No further needs at this time, call light within reach. Will continue POC as ordered.

## 2022-08-10 NOTE — PROGRESS NOTES
Lavelle Ladd is a 67 y.o. male patient.     Subjective   The patient is 6 days post closed reduction external fixation of left tibia and fibular fractures.  He is feeling much better this morning.  The Robaxin seems to be helping.    1. Closed fracture of left tibia and fibula, initial encounter    2. Fall    3. Fracture tibia/fibula, left, closed, initial encounter    4. Preop examination    5. Coronary artery disease    6. Closed fracture of left tibia and fibula with routine healing, subsequent encounter    7. Pressure injury of left hip, stage 2    8. Closed fracture of left tibia and fibula      Past Medical History:   Diagnosis Date    DINORAH (acute kidney injury) 2016    Arthritis     CAD in native artery 2019    CHF (congestive heart failure)     Chronic obstructive pulmonary disease 2016    Coronary artery disease involving native coronary artery of native heart without angina pectoris 2016    CRI (chronic renal insufficiency) 2019     donor kidney transplant for DM 16     Induction with Thymo x3 and IV solumedrol to total 875mg  Kidney Biopsy  2016: 16 glomeruli, ACR type 1 AVR type 2, significant microcirculatory changes, c4d negative, No DSA, 5 to10% fibrosis. Treated with thymo x8 2016- no rejection      Diastolic heart failure     Encounter for blood transfusion     ESRD on RRT since 10/2013 10/29/2013    Biopsy proven diabetic nephropathy and lymphoplasmacytic interstitial infiltrate not c/w with AIN (ddx sjogrens or assoc with tamm-horsefall protein extravasation)     GERD (gastroesophageal reflux disease)     History of hepatitis C, s/p successful Rx w/ SVR12 - 2017    Completed 12 weeks harvoni w/ SVR    Hyperlipidemia     Hypertension     PAD (peripheral artery disease) 2019    PIC line (peripherally inserted central catheter) flush     Prophylactic immunotherapy     Proteinuria     PVD (peripheral vascular  disease) 6/26/2017    RLE BKA CT 12/11/16 Extensive atherosclerotic disease of the aorta and mesenteric arteries.     Renal hypertension     Type 2 diabetes mellitus with diabetic neuropathy, with long-term current use of insulin 12/1/2016    Vitamin B12 deficiency      Past Surgical History Pertinent Negatives:   Procedure Date Noted    CARDIAC SURGERY 12/08/2015     Scheduled Meds:   amLODIPine  10 mg Oral Daily    apixaban  2.5 mg Oral BID    atorvastatin  80 mg Oral Daily    collagenase   Topical (Top) Daily    fluticasone propionate  2 spray Each Nostril Daily    methocarbamoL  500 mg Oral QID    metoprolol tartrate  25 mg Oral BID    mupirocin   Topical (Top) Daily    sertraline  25 mg Oral Daily    sodium hypochlorite 0.125%   Topical (Top) BID    tacrolimus  1 mg Oral Daily PM    tacrolimus  1.5 mg Oral Daily AM     Continuous Infusions:  PRN Meds:acetaminophen, albuterol-ipratropium, aluminum-magnesium hydroxide-simethicone, benzonatate, dextrose 10%, dextrose 10%, glucagon (human recombinant), guaiFENesin 100 mg/5 ml, hydrALAZINE, insulin aspart U-100, melatonin, morphine, ondansetron, oxyCODONE-acetaminophen, sodium chloride 0.9%, sodium chloride 0.9%, sodium chloride 0.9%    Review of patient's allergies indicates:  No Known Allergies  Active Hospital Problems    Diagnosis  POA    *Closed fracture of left tibia and fibula [S82.202A, S82.402A]  Yes    Pressure injury of left hip, stage 2 [L89.222]  Yes    Severe central sleep apnea comorbid with prescribed opioid use [F11.90, G47.37]  Yes    Kidney transplant recipient [Z94.0]  Not Applicable    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     Blood pressure (!) 159/71, pulse 69, temperature 98.1 °F (36.7 °C), temperature source Oral, resp. rate 20, height 5' 11" (1.803 m), weight 114.7 kg (252 lb 13.9 oz), SpO2 95 %.    Objective   Well-developed overweight male in no acute distress.  Awake, alert, oriented, cooperative with evaluation.    Left lower extremity external fixation intact.  Dressings dry and intact.    Hemoglobin 11.2, glucose 184     Assessment & Plan   The patient's comfort level is significantly improved.  We discussed the plan for skilled nursing.  There insurance issues.  He wonders if maybe he could just go home.  I explained what type of in-home help he would need to make that safe and possible.  He will discuss this further with social work staff when they come by later today.  Continue with current therapy measures.       8/10/2022        Good Villa MD, FAAOS [facility], Orthopedic Trauma Service  [place]    "

## 2022-08-10 NOTE — PT/OT/SLP PROGRESS
Occupational Therapy   Treatment    Name: Lavelle Ladd  MRN: [id number]  Admitting Diagnosis:  Closed fracture of left tibia and fibula  6 Days Post-Op    Recommendations:     Discharge Recommendations: nursing facility, skilled  Discharge Equipment Recommendations:  other (see comments) (TBD by next level of care)  Barriers to discharge:  Decreased caregiver support    Assessment:     Lavelle Ladd is a 67 y.o. male with a medical diagnosis of Closed fracture of left tibia and fibula.  He presents with the following performance deficits affecting function are weakness, impaired endurance, impaired sensation, impaired self care skills, impaired functional mobility, gait instability, impaired balance, decreased coordination, decreased upper extremity function, decreased lower extremity function, decreased safety awareness, pain, decreased ROM, impaired skin, impaired cardiopulmonary response to activity, orthopedic precautions.     Rehab Prognosis:  Fair; patient would benefit from acute skilled OT services to address these deficits and reach maximum level of function.       Plan:     Patient to be seen 2 x/week to address the above listed problems via self-care/home management, therapeutic activities, therapeutic exercises  · Plan of Care Expires: 08/19/22  · Plan of Care Reviewed with: patient    Subjective     Pain/Comfort:  · Pain Rating 1:  (no number reported)  · Location - Side 1: Left  · Location - Orientation 1: lower  · Location 1: leg  · Pain Addressed 1: Other (see comments) (activity pacing)    Objective:     Communicated with: nurse Sherita SHELLEY and epic chart review prior to session.  Patient found supine with bed alarm, external fixator, peripheral IV, telemetry upon OT entry to room.    General Precautions: Standard, fall   Orthopedic Precautions:LLE non weight bearing   Braces: N/A  Respiratory Status: Room air     Occupational Performance:     Bed Mobility:    · Patient completed Rolling/Turning  to Left with  minimum assistance  · Patient completed Supine to Sit with minimum assistance   · Pt performed supine scoot to HOB with min A using bed rails.  · Pt performed forward scooting toward EOB with SBA       Functional Mobility/Transfers:  · Pt performed 3 scoots to R to t/f bed> drop-down BSC using slide board with min A. Pt limited by IV placement in R anterior wrist.      [facility] 6 Click ADL: 16    Treatment & Education:  Pt reporting he did not want to participate in ADL's today. Pt sat EOB ~10 minutes to perform therex, independent with sitting balance. Pt educated in and performed x10 reps BUE AROM therapeutic exercises (shoulder flexion, elbow flexion/ext, and scapular retractions) while sitting EOB. Reinforced LLE nonweightbearing precautions, especially when repositioning. Educated on use of slide board and hand placement for safety. Educated on techniques to use to increase independence and decrease fall risk with functional transfers. Educated on calling for A for needs . Encouraged completion of B UE AROM therex throughout the day to tolerance to increase functional strength and activity tolerance. Patient stated understanding and in agreement with POC.    Patient left HOB elevated with all lines intact, call button in reach, bed alarm on and nurse notifiedEducation:      GOALS:   Multidisciplinary Problems     Occupational Therapy Goals        Problem: Occupational Therapy    Goal Priority Disciplines Outcome Interventions   Occupational Therapy Goal     OT, PT/OT Ongoing, Progressing    Description: Goals to be met by: 8/19/2022     Patient will increase functional independence with ADLs by performing:    LE Dressing with Supervision.  Toilet transfer to bedside commode with Moderate Assistance using AD as needed.  Upper extremity exercise program x15 reps per handout, with independence.                     Time Tracking:     OT Date of Treatment: 08/10/22  OT Start Time: 0955  OT Stop Time:  1018  OT Total Time (min): 23 min    Billable Minutes:Therapeutic Activity 15 minutes  Therapeutic Exercise 8 minutes    OT/MIGUEL: OT       Yuli Solomon OT    8/10/2022

## 2022-08-10 NOTE — PLAN OF CARE
[facility] Accepted and will submit for authorization.         08/10/22 0946   Post-Acute Status   Post-Acute Authorization Placement   Post-Acute Placement Status Pending payor review/awaiting authorization (if required)   Discharge Plan   Discharge Plan A Skilled Nursing Facility   Discharge Plan B Skilled Nursing Facility

## 2022-08-10 NOTE — SUBJECTIVE & OBJECTIVE
Past Medical History:   Diagnosis Date    DINORAH (acute kidney injury) 2016    Arthritis     CAD in native artery 2019    CHF (congestive heart failure)     Chronic obstructive pulmonary disease 2016    Coronary artery disease involving native coronary artery of native heart without angina pectoris 2016    CRI (chronic renal insufficiency) 2019     donor kidney transplant for DM 16     Induction with Thymo x3 and IV solumedrol to total 875mg  Kidney Biopsy  2016: 16 glomeruli, ACR type 1 AVR type 2, significant microcirculatory changes, c4d negative, No DSA, 5 to10% fibrosis. Treated with thymo x8 2016- no rejection      Diastolic heart failure     Encounter for blood transfusion     ESRD on RRT since 10/2013 10/29/2013    Biopsy proven diabetic nephropathy and lymphoplasmacytic interstitial infiltrate not c/w with AIN (ddx sjogrens or assoc with tamm-horsefall protein extravasation)     GERD (gastroesophageal reflux disease)     History of hepatitis C, s/p successful Rx w/ SVR12 - 2017    Completed 12 weeks harvoni w/ SVR    Hyperlipidemia     Hypertension     PAD (peripheral artery disease) 2019    PIC line (peripherally inserted central catheter) flush     Prophylactic immunotherapy     Proteinuria     PVD (peripheral vascular disease) 2017    RLE BKA CT 16 Extensive atherosclerotic disease of the aorta and mesenteric arteries.     Renal hypertension     Type 2 diabetes mellitus with diabetic neuropathy, with long-term current use of insulin 2016    Vitamin B12 deficiency        Past Surgical History:   Procedure Laterality Date    AORTOGRAPHY WITH SERIALOGRAPHY N/A 2018    Procedure: LEFT LEG ANGIOGRAM;  Surgeon: Donal Mcdonald MD;  Location: [facility] CATH LAB;  Service: Vascular;  Laterality: N/A;    APPLICATION OF LARGE EXTERNAL FIXATION DEVICE TO TIBIA Left 2022    Procedure: APPLICATION, EXTERNAL FIXATION DEVICE, LARGE, TIBIA;   Surgeon: Good Villa MD;  Location: [facility] OR;  Service: Orthopedics;  Laterality: Left;    av bovine graft      Left UE    AV FISTULA PLACEMENT      left UE    CARDIAC CATHETERIZATION  02/2015    CLOSED REDUCTION OF INJURY OF TIBIA Left 8/4/2022    Procedure: CLOSED REDUCTION, TIBIA;  Surgeon: Good Vlila MD;  Location: [facility] OR;  Service: Orthopedics;  Laterality: Left;  Closed reduction left tibial and fibular shaft fractures    CLOSURE OF WOUND Left 9/24/2018    Procedure: CLOSURE, WOUND;  Surgeon: Karla Wheeler DPM;  Location: [facility] OR;  Service: Podiatry;  Laterality: Left;  Secondary Wound closure, extensive    CLOSURE OF WOUND Left 11/5/2018    Procedure: CLOSURE, WOUND;  Surgeon: Karla Wheeler DPM;  Location: [facility] OR;  Service: Podiatry;  Laterality: Left;    DEBRIDEMENT OF MULTIPLE METATARSAL BONES Left 11/5/2018    Procedure: DEBRIDEMENT, METATARSAL BONE, 2 OR MORE BONES;  Surgeon: Karla Wheeler DPM;  Location: [facility] OR;  Service: Podiatry;  Laterality: Left;    EXCISION OF SKIN Left 9/27/2019    Procedure: EXCISION, SKIN;  Surgeon: Lenard Alarcon MD;  Location: [address];  Service: Plastics;  Laterality: Left;  Plastics set, NIMS monitor, ACell    FOOT AMPUTATION THROUGH METATARSAL Left 9/21/2018    Procedure: AMPUTATION, FOOT, TRANSMETATARSAL;  Surgeon: Karla Wheeler DPM;  Location: [facility] OR;  Service: Podiatry;  Laterality: Left;    FOOT AMPUTATION THROUGH METATARSAL Left 10/31/2018    Procedure: AMPUTATION, FOOT, TRANSMETATARSAL;  Surgeon: Karla Wheeler DPM;  Location: [facility] OR;  Service: Podiatry;  Laterality: Left;    FOOT AMPUTATION THROUGH METATARSAL Left 11/5/2018    Procedure: AMPUTATION, FOOT, TRANSMETATARSAL;  Surgeon: Karla Wheeler DPM;  Location: [facility] OR;  Service: Podiatry;  Laterality: Left;  revisional transmetatarsal amputation, Left foot    IRRIGATION AND DEBRIDEMENT OF LOWER EXTREMITY Left 10/31/2018    Procedure: IRRIGATION AND  DEBRIDEMENT, LOWER EXTREMITY;  Surgeon: Karla Wheeler DPM;  Location: [facility] OR;  Service: Podiatry;  Laterality: Left;    KIDNEY TRANSPLANT  05/21/2016    LEFT HEART CATHETERIZATION Left 7/21/2019    Procedure: CATHETERIZATION, HEART, LEFT;  Surgeon: Andrew Valdez MD;  Location: [facility] CATH LAB;  Service: Cardiology;  Laterality: Left;    LEG AMPUTATION THROUGH KNEE  2011    right LE, started as nail puncture leading to diabetic ulcer    SKIN FULL THICKNESS GRAFT Left 10/7/2019    Procedure: APPLICATION, GRAFT, SKIN, FULL-THICKNESS;  Surgeon: Lenard Alarcon MD;  Location: [address];  Service: Plastics;  Laterality: Left;    SURGICAL REMOVAL OF LESION OF FACE Right 10/7/2019    Procedure: EXCISION, LESION, FACE;  Surgeon: Lenard Alarcon MD;  Location: [facility] OR [address] [place] [address];  Service: Plastics;  Laterality: Right;       Review of patient's allergies indicates:  No Known Allergies    No current facility-administered medications on file prior to encounter.     Current Outpatient Medications on File Prior to Encounter   Medication Sig    amLODIPine (NORVASC) 10 MG tablet Take 1 tablet (10 mg total) by mouth once daily.    aspirin (ECOTRIN) 81 MG EC tablet Take 1 tablet (81 mg total) by mouth once daily.    atorvastatin (LIPITOR) 80 MG tablet Take 1 tablet (80 mg total) by mouth once daily.    clopidogreL (PLAVIX) 75 mg tablet Take 1 tablet (75 mg total) by mouth once daily.    ergocalciferol (ERGOCALCIFEROL) 50,000 unit Cap Take 1 capsule (50,000 Units total) by mouth every 7 days. Take on Mondays    furosemide (LASIX) 40 MG tablet Take 1 tablet (40 mg total) by mouth daily as needed (Leg swelling, Nocturnal SOB).    gabapentin (NEURONTIN) 300 MG capsule Take 300 mg by mouth 3 (three) times daily.    HYDROcodone-acetaminophen (NORCO)  mg per tablet 1 tablet by Per G Tube route every 6 (six) hours as needed for Pain.    hydrocortisone 2.5 % cream Apply topically 2 (two) times daily.    insulin aspart U-100  (NOVOLOG) 100 unit/mL (3 mL) InPn pen Inject 5 units three times a day with meal if BG > 300.    levalbuterol (XOPENEX) 1.25 mg/3 mL nebulizer solution Take 3 mLs (1.25 mg total) by nebulization every 6 to 8 hours as needed for Wheezing or Shortness of Breath.    LIDOcaine (LIDODERM) 5 % Place 1 patch onto the skin daily as needed. 12 hours on and 12 hours off    metoprolol tartrate (LOPRESSOR) 25 MG tablet Take 1 tablet (25 mg total) by mouth 2 (two) times daily.    mycophenolate (CELLCEPT) 250 mg Cap Take 1 capsule (250 mg total) by mouth 2 (two) times daily.    nitroGLYCERIN (NITROSTAT) 0.4 MG SL tablet Place 1 tablet (0.4 mg total) under the tongue every 5 (five) minutes as needed.    ondansetron (ZOFRAN-ODT) 4 MG TbDL Take 1 tablet (4 mg total) by mouth every 8 (eight) hours as needed.    sevelamer carbonate (RENVELA) 800 mg Tab Take 800 mg by mouth 3 (three) times daily with meals.    tacrolimus (PROGRAF) 0.5 MG Cap Take 2 capsules (1 mg total) by mouth every 12 (twelve) hours.    cloNIDine (CATAPRES) 0.1 MG tablet Take 1 tablet (0.1 mg total) by mouth 3 (three) times daily. (Patient not taking: Reported on 8/4/2022)    flash glucose scanning reader (FREESTYLE BRYAN 14 DAY READER) Misc 1 Device by Misc.(Non-Drug; Combo Route) route as needed.    flash glucose sensor (FREESTYLE BRYAN 14 DAY SENSOR) Kit 1 kit by Misc.(Non-Drug; Combo Route) route every 14 (fourteen) days.    ipratropium (ATROVENT) 0.02 % nebulizer solution Take 2.5 mLs (500 mcg total) by nebulization 4 (four) times daily. (Patient not taking: Reported on 8/4/2022)    isosorbide mononitrate (IMDUR) 30 MG 24 hr tablet Take 2 tablets (60 mg total) by mouth once daily. (Patient not taking: Reported on 8/4/2022)    ketoconazole (NIZORAL) 2 % cream Apply topically 2 (two) times daily.    linaCLOtide (LINZESS) 72 mcg Cap capsule Take 1 capsule (72 mcg total) by mouth before breakfast.    naloxone (NARCAN) 4 mg/actuation Spry 1 spray by Nasal route  once.    semaglutide (OZEMPIC) 1 mg/dose (2 mg/1.5 mL) PnIj Inject 1 mg under the skin every 7 days. (Patient taking differently: ())    sertraline (ZOLOFT) 25 MG tablet Take 1 tablet (25 mg total) by mouth once daily. For depression and anxiety (Patient not taking: Reported on 2022)    VIOS AEROSOL DELIVERY SYSTEM [place] USE Q 4 H PRN     Family History       Problem Relation (Age of Onset)    Cancer Father    Diabetes Father    Heart failure Father, Mother    Stroke Father          Tobacco Use    Smoking status: Former Smoker     Packs/day: 1.00     Years: 40.00     Pack years: 40.00     Quit date: 2013     Years since quittin.5    Smokeless tobacco: Never Used   Substance and Sexual Activity    Alcohol use: Yes     Alcohol/week: 6.0 standard drinks     Types: 6 Cans of beer per week     Comment: seldom    Drug use: No    Sexual activity: Never     Review of Systems   Constitutional:  Negative for fatigue and fever.   HENT:  Negative for sinus pressure.    Eyes:  Negative for visual disturbance.   Respiratory:  Negative for shortness of breath.    Cardiovascular:  Negative for chest pain.   Gastrointestinal:  Negative for vomiting.   Genitourinary:  Negative for difficulty urinating.   Musculoskeletal:  Positive for arthralgias (improved), back pain (chronic) and gait problem.   Skin:  Negative for rash.   Neurological:  Negative for headaches.   Psychiatric/Behavioral:  Negative for confusion.    Objective:     Vital Signs (Most Recent):  Temp: 98.5 °F (36.9 °C) (08/10/22 1608)  Pulse: 63 (08/10/22 1608)  Resp: 17 (08/10/22 1608)  BP: (!) 142/63 (08/10/22 1608)  SpO2: 97 % (08/10/22 1608)   Vital Signs (24h Range):  Temp:  [98.1 °F (36.7 °C)-99 °F (37.2 °C)] 98.5 °F (36.9 °C)  Pulse:  [63-73] 63  Resp:  [16-20] 17  SpO2:  [92 %-97 %] 97 %  BP: (125-178)/(58-79) 142/63     Weight: 114.7 kg (252 lb 13.9 oz)  Body mass index is 35.27 kg/m².    Physical Exam  Constitutional:       General: He is  not in acute distress.     Appearance: He is well-developed. He is obese. He is not diaphoretic.   HENT:      Head: Normocephalic and atraumatic.   Eyes:      Pupils: Pupils are equal, round, and reactive to light.   Cardiovascular:      Rate and Rhythm: Normal rate and regular rhythm.      Heart sounds: Normal heart sounds. No murmur heard.    No friction rub. No gallop.   Pulmonary:      Effort: Pulmonary effort is normal. No respiratory distress.      Breath sounds: Normal breath sounds. No stridor. No wheezing or rales.   Abdominal:      General: Bowel sounds are normal. There is no distension.      Palpations: Abdomen is soft. There is no mass.      Tenderness: There is no abdominal tenderness. There is no guarding.   Musculoskeletal:      Comments: Right bka, left transmetatarsal amputation, external fixator LLE   Skin:     General: Skin is warm.      Findings: No erythema.   Neurological:      Mental Status: He is alert and oriented to person, place, and time.         CRANIAL NERVES     CN III, IV, VI   Pupils are equal, round, and reactive to light.     Significant Labs:   Results for orders placed or performed during the hospital encounter of 08/04/22   CBC auto differential   Result Value Ref Range    WBC 5.41 3.90 - 12.70 K/uL    RBC 4.28 (L) 4.60 - 6.20 M/uL    Hemoglobin 13.0 (L) 14.0 - 18.0 g/dL    Hematocrit 42.6 40.0 - 54.0 %     (H) 82 - 98 fL    MCH 30.4 27.0 - 31.0 pg    MCHC 30.5 (L) 32.0 - 36.0 g/dL    RDW 13.5 11.5 - 14.5 %    Platelets 201 150 - 450 K/uL    MPV 9.2 9.2 - 12.9 fL    Immature Granulocytes 0.4 0.0 - 0.5 %    Gran # (ANC) 3.7 1.8 - 7.7 K/uL    Immature Grans (Abs) 0.02 0.00 - 0.04 K/uL    Lymph # 1.0 1.0 - 4.8 K/uL    Mono # 0.6 0.3 - 1.0 K/uL    Eos # 0.0 0.0 - 0.5 K/uL    Baso # 0.02 0.00 - 0.20 K/uL    nRBC 0 0 /100 WBC    Gran % 69.1 38.0 - 73.0 %    Lymph % 18.1 18.0 - 48.0 %    Mono % 11.8 4.0 - 15.0 %    Eosinophil % 0.2 0.0 - 8.0 %    Basophil % 0.4 0.0 - 1.9 %     Differential Method Automated    Comprehensive metabolic panel   Result Value Ref Range    Sodium 136 136 - 145 mmol/L    Potassium 4.9 3.5 - 5.1 mmol/L    Chloride 102 95 - 110 mmol/L    CO2 24 23 - 29 mmol/L    Glucose 250 (H) 70 - 110 mg/dL    BUN 30 (H) 8 - 23 mg/dL    Creatinine 1.8 (H) 0.5 - 1.4 mg/dL    Calcium 8.4 (L) 8.7 - 10.5 mg/dL    Total Protein 6.7 6.0 - 8.4 g/dL    Albumin 3.0 (L) 3.5 - 5.2 g/dL    Total Bilirubin 0.5 0.1 - 1.0 mg/dL    Alkaline Phosphatase 156 (H) 55 - 135 U/L     (H) 10 - 40 U/L    ALT 73 (H) 10 - 44 U/L    Anion Gap 10 8 - 16 mmol/L    eGFR 40 (A) >60 mL/min/1.73 m^2   COVID-19 Rapid Screening   Result Value Ref Range    SARS-CoV-2 RNA, Amplification, Qual Negative Negative   Hemoglobin A1c if not done in past 3 months   Result Value Ref Range    Hemoglobin A1C 7.4 (H) 4.0 - 5.6 %    Estimated Avg Glucose 166 (H) 68 - 131 mg/dL   CBC Auto Differential   Result Value Ref Range    WBC 4.67 3.90 - 12.70 K/uL    RBC 3.76 (L) 4.60 - 6.20 M/uL    Hemoglobin 11.7 (L) 14.0 - 18.0 g/dL    Hematocrit 37.4 (L) 40.0 - 54.0 %     (H) 82 - 98 fL    MCH 31.1 (H) 27.0 - 31.0 pg    MCHC 31.3 (L) 32.0 - 36.0 g/dL    RDW 13.4 11.5 - 14.5 %    Platelets 155 150 - 450 K/uL    MPV 9.4 9.2 - 12.9 fL    Immature Granulocytes 0.2 0.0 - 0.5 %    Gran # (ANC) 3.0 1.8 - 7.7 K/uL    Immature Grans (Abs) 0.01 0.00 - 0.04 K/uL    Lymph # 1.0 1.0 - 4.8 K/uL    Mono # 0.7 0.3 - 1.0 K/uL    Eos # 0.0 0.0 - 0.5 K/uL    Baso # 0.01 0.00 - 0.20 K/uL    nRBC 0 0 /100 WBC    Gran % 64.5 38.0 - 73.0 %    Lymph % 21.0 18.0 - 48.0 %    Mono % 13.9 4.0 - 15.0 %    Eosinophil % 0.2 0.0 - 8.0 %    Basophil % 0.2 0.0 - 1.9 %    Differential Method Automated    Basic Metabolic Panel   Result Value Ref Range    Sodium 135 (L) 136 - 145 mmol/L    Potassium 4.2 3.5 - 5.1 mmol/L    Chloride 105 95 - 110 mmol/L    CO2 21 (L) 23 - 29 mmol/L    Glucose 159 (H) 70 - 110 mg/dL    BUN 26 (H) 8 - 23 mg/dL    Creatinine 1.5  (H) 0.5 - 1.4 mg/dL    Calcium 7.7 (L) 8.7 - 10.5 mg/dL    Anion Gap 9 8 - 16 mmol/L    eGFR 50 (A) >60 mL/min/1.73 m^2   Magnesium   Result Value Ref Range    Magnesium 1.8 1.6 - 2.6 mg/dL   Tacrolimus level   Result Value Ref Range    Tacrolimus Lvl 2.0 (L) 5.0 - 15.0 ng/mL   Comprehensive Metabolic Panel   Result Value Ref Range    Sodium 135 (L) 136 - 145 mmol/L    Potassium 4.2 3.5 - 5.1 mmol/L    Chloride 105 95 - 110 mmol/L    CO2 23 23 - 29 mmol/L    Glucose 163 (H) 70 - 110 mg/dL    BUN 15 8 - 23 mg/dL    Creatinine 1.1 0.5 - 1.4 mg/dL    Calcium 7.8 (L) 8.7 - 10.5 mg/dL    Total Protein 5.3 (L) 6.0 - 8.4 g/dL    Albumin 2.3 (L) 3.5 - 5.2 g/dL    Total Bilirubin 0.7 0.1 - 1.0 mg/dL    Alkaline Phosphatase 120 55 - 135 U/L    AST 35 10 - 40 U/L    ALT 19 10 - 44 U/L    Anion Gap 7 (L) 8 - 16 mmol/L    eGFR >60 >60 mL/min/1.73 m^2   Tacrolimus level   Result Value Ref Range    Tacrolimus Lvl 6.4 5.0 - 15.0 ng/mL   Renal Function Panel   Result Value Ref Range    Glucose 146 (H) 70 - 110 mg/dL    Sodium 135 (L) 136 - 145 mmol/L    Potassium 4.2 3.5 - 5.1 mmol/L    Chloride 104 95 - 110 mmol/L    CO2 23 23 - 29 mmol/L    BUN 16 8 - 23 mg/dL    Calcium 7.9 (L) 8.7 - 10.5 mg/dL    Creatinine 1.2 0.5 - 1.4 mg/dL    Albumin 2.3 (L) 3.5 - 5.2 g/dL    Phosphorus 2.2 (L) 2.7 - 4.5 mg/dL    eGFR >60 >60 mL/min/1.73 m^2    Anion Gap 8 8 - 16 mmol/L   Magnesium   Result Value Ref Range    Magnesium 2.0 1.6 - 2.6 mg/dL   Renal Function Panel   Result Value Ref Range    Glucose 119 (H) 70 - 110 mg/dL    Sodium 135 (L) 136 - 145 mmol/L    Potassium 4.2 3.5 - 5.1 mmol/L    Chloride 103 95 - 110 mmol/L    CO2 24 23 - 29 mmol/L    BUN 14 8 - 23 mg/dL    Calcium 8.0 (L) 8.7 - 10.5 mg/dL    Creatinine 1.2 0.5 - 1.4 mg/dL    Albumin 2.3 (L) 3.5 - 5.2 g/dL    Phosphorus 2.2 (L) 2.7 - 4.5 mg/dL    eGFR >60 >60 mL/min/1.73 m^2    Anion Gap 8 8 - 16 mmol/L   Tacrolimus level   Result Value Ref Range    Tacrolimus Lvl 6.6 5.0 - 15.0  ng/mL   Renal Function Panel   Result Value Ref Range    Glucose 144 (H) 70 - 110 mg/dL    Sodium 134 (L) 136 - 145 mmol/L    Potassium 4.3 3.5 - 5.1 mmol/L    Chloride 103 95 - 110 mmol/L    CO2 24 23 - 29 mmol/L    BUN 14 8 - 23 mg/dL    Calcium 8.1 (L) 8.7 - 10.5 mg/dL    Creatinine 1.1 0.5 - 1.4 mg/dL    Albumin 2.3 (L) 3.5 - 5.2 g/dL    Phosphorus 2.1 (L) 2.7 - 4.5 mg/dL    eGFR >60 >60 mL/min/1.73 m^2    Anion Gap 7 (L) 8 - 16 mmol/L   Tacrolimus level   Result Value Ref Range    Tacrolimus Lvl 6.5 5.0 - 15.0 ng/mL   CBC auto differential   Result Value Ref Range    WBC 3.38 (L) 3.90 - 12.70 K/uL    RBC 3.56 (L) 4.60 - 6.20 M/uL    Hemoglobin 11.2 (L) 14.0 - 18.0 g/dL    Hematocrit 35.0 (L) 40.0 - 54.0 %    MCV 98 82 - 98 fL    MCH 31.5 (H) 27.0 - 31.0 pg    MCHC 32.0 32.0 - 36.0 g/dL    RDW 13.5 11.5 - 14.5 %    Platelets 127 (L) 150 - 450 K/uL    MPV 9.7 9.2 - 12.9 fL    Immature Granulocytes 0.3 0.0 - 0.5 %    Gran # (ANC) 1.9 1.8 - 7.7 K/uL    Immature Grans (Abs) 0.01 0.00 - 0.04 K/uL    Lymph # 0.9 (L) 1.0 - 4.8 K/uL    Mono # 0.6 0.3 - 1.0 K/uL    Eos # 0.0 0.0 - 0.5 K/uL    Baso # 0.01 0.00 - 0.20 K/uL    nRBC 0 0 /100 WBC    Gran % 55.3 38.0 - 73.0 %    Lymph % 25.7 18.0 - 48.0 %    Mono % 17.2 (H) 4.0 - 15.0 %    Eosinophil % 1.2 0.0 - 8.0 %    Basophil % 0.3 0.0 - 1.9 %    Differential Method Automated    Echo Saline Bubble? No   Result Value Ref Range    BSA 2.27 m2    TDI SEPTAL 0.09 m/s    LV LATERAL E/E' RATIO 8.45 m/s    LV SEPTAL E/E' RATIO 10.33 m/s    LA WIDTH 3.60 cm    IVC diameter 1.30 cm    Left Ventricular Outflow Tract Mean Velocity 0.72384148169 cm/s    Left Ventricular Outflow Tract Mean Gradient 2.13 mmHg    TDI LATERAL 0.11 m/s    LVIDd 3.72 3.5 - 6.0 cm    IVS 1.59 (A) 0.6 - 1.1 cm    Posterior Wall 1.78 (A) 0.6 - 1.1 cm    Ao root annulus 3.36 cm    LVIDs 2.45 2.1 - 4.0 cm    FS 34 28 - 44 %    LA volume 57.29 cm3    Sinus 3.49 cm    STJ 3.49 cm    Ascending aorta 3.28 cm    LV  mass 254.30 g    LA size 3.62 cm    Left Ventricle Relative Wall Thickness 0.96 cm    AV mean gradient 3 mmHg    AV valve area 2.80 cm2    AV Velocity Ratio 0.75     AV index (prosthetic) 0.75     MV valve area p 1/2 method 3.53 cm2    E/A ratio 0.81     Mean e' 0.10 m/s    E wave deceleration time [id number] msec    IVRT 82.147046171 msec    LVOT diameter 2.18 cm    LVOT area 3.7 cm2    LVOT peak honorio 0.84 m/s    LVOT peak VTI 18.60 cm    Ao peak honorio 1.12 m/s    Ao VTI 24.8 cm    RVOT peak honorio 0.91 m/s    RVOT peak VTI 18.1 cm    LVOT stroke volume 69.39 cm3    AV peak gradient 5 mmHg    PV mean gradient 2.63 mmHg    E/E' ratio 9.30 m/s    MV Peak E Honorio 0.93 m/s    TR Max Honorio 2.53 m/s    MV stenosis pressure 1/2 time [id number] ms    MV Peak A Honorio 1.15 m/s    LV Systolic Volume 21.24 mL    LV Systolic Volume Index 9.6 mL/m2    LV Diastolic Volume 58.87 mL    LV Diastolic Volume Index 26.52 mL/m2    LA Volume Index 25.8 mL/m2    LV Mass Index 115 g/m2    RA Major Axis 4.42 cm    Left Atrium Minor Axis 4.79 cm    Left Atrium Major Axis 5.62 cm    Triscuspid Valve Regurgitation Peak Gradient 26 mmHg    RA Width 2.57 cm    Right Atrial Pressure (from IVC) 3 mmHg    EF 60 %    TV rest pulmonary artery pressure 29 mmHg   POCT glucose   Result Value Ref Range    POCT Glucose 263 (H) 70 - 110 mg/dL   POCT glucose   Result Value Ref Range    POCT Glucose 264 (H) 70 - 110 mg/dL   POCT glucose   Result Value Ref Range    POCT Glucose 192 (H) 70 - 110 mg/dL   POCT glucose   Result Value Ref Range    POCT Glucose 210 (H) 70 - 110 mg/dL   POCT glucose   Result Value Ref Range    POCT Glucose 157 (H) 70 - 110 mg/dL   POCT glucose   Result Value Ref Range    POCT Glucose 178 (H) 70 - 110 mg/dL   POCT glucose   Result Value Ref Range    POCT Glucose 193 (H) 70 - 110 mg/dL   POCT glucose   Result Value Ref Range    POCT Glucose 189 (H) 70 - 110 mg/dL   POCT glucose   Result Value Ref Range    POCT Glucose 183 (H) 70  - 110 mg/dL   POCT glucose   Result Value Ref Range    POCT Glucose 162 (H) 70 - 110 mg/dL   POCT glucose   Result Value Ref Range    POCT Glucose 160 (H) 70 - 110 mg/dL   POCT glucose   Result Value Ref Range    POCT Glucose 152 (H) 70 - 110 mg/dL   POCT glucose   Result Value Ref Range    POCT Glucose 143 (H) 70 - 110 mg/dL   POCT glucose   Result Value Ref Range    POCT Glucose 163 (H) 70 - 110 mg/dL   POCT glucose   Result Value Ref Range    POCT Glucose 166 (H) 70 - 110 mg/dL   POCT glucose   Result Value Ref Range    POCT Glucose 150 (H) 70 - 110 mg/dL   POCT glucose   Result Value Ref Range    POCT Glucose 123 (H) 70 - 110 mg/dL   POCT glucose   Result Value Ref Range    POCT Glucose 165 (H) 70 - 110 mg/dL   POCT glucose   Result Value Ref Range    POCT Glucose 188 (H) 70 - 110 mg/dL   POCT glucose   Result Value Ref Range    POCT Glucose 183 (H) 70 - 110 mg/dL   POCT glucose   Result Value Ref Range    POCT Glucose 145 (H) 70 - 110 mg/dL   POCT glucose   Result Value Ref Range    POCT Glucose 210 (H) 70 - 110 mg/dL   POCT glucose   Result Value Ref Range    POCT Glucose 234 (H) 70 - 110 mg/dL   POCT glucose   Result Value Ref Range    POCT Glucose 195 (H) 70 - 110 mg/dL   POCT glucose   Result Value Ref Range    POCT Glucose 184 (H) 70 - 110 mg/dL   POCT glucose   Result Value Ref Range    POCT Glucose 191 (H) 70 - 110 mg/dL   POCT glucose   Result Value Ref Range    POCT Glucose 209 (H) 70 - 110 mg/dL     *Note: Due to a large number of results and/or encounters for the requested time period, some results have not been displayed. A complete set of results can be found in Results Review.        Significant Imaging: X-Ray Ankle Complete Left  Narrative: EXAMINATION:  XR ANKLE COMPLETE 3 VIEW LEFT    CLINICAL HISTORY:  intraop;    COMPARISON:  None  Impression: FINDINGS/  Intraoperative fluoroscopic images were obtained.    Total fluoro time was 1.4 minute and 2 fluoroscopic images were obtained.  See  operative report.    Electronically signed by: Ryan Joya MD  Date:    08/04/2022  Time:    15:55  SURG [facility] Surgery Fluoro Usage  See OP Notes for results.     IMPRESSION: See OP Notes for results.     This procedure was auto-finalized by: [facility] [employer]  Echo Saline Bubble? No  · The left ventricle is normal in size with moderate concentric   hypertrophy and normal systolic function.  · The estimated ejection fraction is 60%.  · Normal left ventricular diastolic function.  · Normal right ventricular size with normal right ventricular systolic   function.  · Mild tricuspid regurgitation.  · Normal central venous pressure (3 mmHg).  · The estimated PA systolic pressure is 29 mmHg.     X-Ray Chest AP Portable  Narrative: EXAMINATION:  XR CHEST AP PORTABLE    CLINICAL HISTORY:  Encounter for other preprocedural examination    FINDINGS:  Single view of the chest.  08/15/2021 comparison    Cardiac silhouette is normal.  Aorta demonstrates atherosclerotic disease. The lungs demonstrate no evidence of active disease.  Mild bibasilar atelectasis.  No evidence of pleural effusion or pneumothorax.  Bones appear intact demonstrate scattered degenerative change.  Impression: No acute process seen.    Electronically signed by: Ryan Joya MD  Date:    08/04/2022  Time:    07:14  X-Ray Tibia Fibula 2 View Left  Narrative: EXAMINATION:  XR TIBIA FIBULA 2 VIEW LEFT; XR ANKLE COMPLETE 3 VIEW LEFT    CLINICAL HISTORY:  XR TIBIA FIBULA 2 VIEW LEFT; XR ANKLE COMPLETE 3 VIEW LEFTUnspecified fall, initial encounter    COMPARISON:  None    FINDINGS:  Four views of the left tibia/fibula and three views of the left ankle were obtained.    Comminuted tibial metaphyseal fracture with angulation and mild displacement.  There is extension to the medial articular surface and involvement of the interosseous membrane between the tibia and fibula.  Transverse fracture of the distal fibula above the lateral malleolus with mild displacement  and angulation.  Mildly comminuted proximal fibular fracture.  Moderate joint effusion of the ankle.  Small avulsion injury at the tip of the medial malleolus    Diffuse osteopenia and moderate soft tissue swelling.  Dense vascular calcifications.  Moderate degenerative changes of the ankle and knee joints.  Advanced degenerative changes within the midfoot.  Large plantar calcaneal spur.  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  Impression: See above.    Electronically signed by: Ryan Joya MD  Date:    08/04/2022  Time:    06:59  X-Ray Ankle Complete Left  Narrative: EXAMINATION:  XR TIBIA FIBULA 2 VIEW LEFT; XR ANKLE COMPLETE 3 VIEW LEFT    CLINICAL HISTORY:  XR TIBIA FIBULA 2 VIEW LEFT; XR ANKLE COMPLETE 3 VIEW LEFTUnspecified fall, initial encounter    COMPARISON:  None    FINDINGS:  Four views of the left tibia/fibula and three views of the left ankle were obtained.    Comminuted tibial metaphyseal fracture with angulation and mild displacement.  There is extension to the medial articular surface and involvement of the interosseous membrane between the tibia and fibula.  Transverse fracture of the distal fibula above the lateral malleolus with mild displacement and angulation.  Mildly comminuted proximal fibular fracture.  Moderate joint effusion of the ankle.  Small avulsion injury at the tip of the medial malleolus    Diffuse osteopenia and moderate soft tissue swelling.  Dense vascular calcifications.  Moderate degenerative changes of the ankle and knee joints.  Advanced degenerative changes within the midfoot.  Large plantar calcaneal spur.  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  Impression: See above.    Electronically signed by: Ryan Joya MD  Date:    08/04/2022  Time:    06:59

## 2022-08-10 NOTE — PT/OT/SLP PROGRESS
Physical Therapy  Treatment    Lavelle Ladd   MRN: [id number]   Admitting Diagnosis: Closed fracture of left tibia and fibula    PT Received On: 08/10/22  PT Start Time: 0955     PT Stop Time: 1020    PT Total Time (min): 25 min       Billable Minutes:  Therapeutic Activity 25    Treatment Type: Treatment  PT/PTA: PTA     PTA Visit Number: 3       General Precautions: Standard, fall  Orthopedic Precautions: LLE non weight bearing   Braces: N/A  Respiratory Status: Room air         Subjective:  Communicated with patient's nurse, Sherita DURAN, and completed Epic chart review prior to session.  Patient agreed to PT session with encouragement.    Pain/Comfort  Pain Rating 1: 5/10  Location - Side 1: Left  Location 1: leg  Pain Addressed 1: Other (see comments) (ACTIVITY PACING)    Objective:   Patient found with: bed alarm, external fixator, peripheral IV, telemetry    Functional Mobility:  Supine > sit EOB: Min A    Seated EOB x10 min total focusing on increased tolerance to upright position, core stability, trunk control and CV endurance.   Independent to maintain sitting  Balance in both self supported and unsupported positions.   Completed AROM TE to BLE while EOB x10 reps: LAQ & Hip Flex    Initiated SB T/F from EOB <> drop arm BSC.. patient was able to scoot 3x towards BSC before c/o increased pain in IV insertion site which was in R wrist.  Scooted 3x back into bed.    Lateral scoot towards HOB: Min A    Sit > supine: Min A    Supine scoot towards HOB: Min A (therapist held LLE in non weight bearing position as patient would consistently try to push through it)    Educated patient on importance of increased tolerance to upright position in order to promote CV endurance. Encouraged patient to utilize elevated HOB to achieve simulated chair position until able to safely complete T/F. Encouraged patient to perform AROM TE to BLE throughout the day in order to prevent further loss of ROM and strength. Re enforced  importance of utilizing call light to meet needs in room. Patient verbalized understanding of all education and agreed to comply.    AM-PAC 6 CLICK MOBILITY  How much help from another person does this patient currently need?   1 = Unable, Total/Dependent Assistance  2 = A lot, Maximum/Moderate Assistance  3 = A little, Minimum/Contact Guard/Supervision  4 = None, Modified [place]/Independent    Turning over in bed (including adjusting bedclothes, sheets and blankets)?: 4  Sitting down on and standing up from a chair with arms (e.g., wheelchair, bedside commode, etc.): 1  Moving from lying on back to sitting on the side of the bed?: 3  Moving to and from a bed to a chair (including a wheelchair)?:  (ATTEMPTED SB T/F TODAY..SEE NOTE)  Need to walk in hospital room?: 1  Climbing 3-5 steps with a railing?: 1    AM-PAC Raw Score CMS G-Code Modifier Level of Impairment Assistance   6 % Total / Unable   7 - 9 CM 80 - 100% Maximal Assist   10 - 14 CL 60 - 80% Moderate Assist   15 - 19 CK 40 - 60% Moderate Assist   20 - 22 CJ 20 - 40% Minimal Assist   23 CI 1-20% SBA / CGA   24 CH 0% Independent/ Mod I     Patient left with bed in chair position with call button in reach.    Assessment:  Lavelle Ladd is a 67 y.o. male with a medical diagnosis of Closed fracture of left tibia and fibula and presents with overall decline in functional mobility. Patient would continue to benefit from skilled PT to address functional limitations listed below in order to return to PLOF/decrease caregiver burden.       Rehab identified problem list/impairments: Rehab identified problem list/impairments: weakness, impaired endurance, impaired sensation, impaired self care skills, impaired functional mobility, impaired cognition, decreased coordination, decreased lower extremity function, decreased upper extremity function, decreased safety awareness, pain, decreased ROM, impaired coordination, impaired skin, edema, impaired  cardiopulmonary response to activity, orthopedic precautions    Rehab potential is fair.    Activity tolerance: Fair    Discharge recommendations: Discharge Facility/Level of Care Needs: nursing facility, skilled     Barriers to discharge:      Equipment recommendations: Equipment Needed After Discharge: other (see comments) (TBD BY NEXT LEVEL OF CARE)     GOALS:   Multidisciplinary Problems     Physical Therapy Goals        Problem: Physical Therapy    Goal Priority Disciplines Outcome Goal Variances Interventions   Physical Therapy Goal     PT, PT/OT      Description: LT22  1. PT WILL COMPLETE BED MOBILITY IND  2. PT WILL SCOOT FOR T/F TRAINING NWB  B LE WITH SBA IN BED  3. PT WILL COMPLETE SLIDE BOARD T/F TO WC WITH MAX A>MODA                   PLAN:    Patient to be seen 3 x/week  to address the above listed problems via therapeutic activities, therapeutic exercises, neuromuscular re-education  Plan of Care expires: 22  Plan of Care reviewed with: patient         08/10/2022

## 2022-08-10 NOTE — PROGRESS NOTES
[facility]  Progress Note    Patient Name: Lavelle Ladd  MRN: [id number]  Patient Class: IP- Inpatient   Admission Date: 8/4/2022  Length of Stay: 6 days  Attending Physician: Gabi Mathias MD  Primary Care Provider: Yahir Calzada MD        Subjective:     Principal Problem:Closed fracture of left tibia and fibula        HPI:  Patient is a 67 y.o.  male with a PMHx of HTN, CAD, DM, PVD, kidney transplant, COPD, right BKA, left transmetatarsal amputation who presents to the Emergency Department for evaluation of a fall which onset suddenly 5 hours ago. Patient states he was trying to get into his wheel chair when he slipped and fell. He reported not being able to put weight on it and laid on the ground for 4-5 hours before calling ems. Patient denied any issues with anesthesia with prior surgeries. Currently complains of pain and nausea. Otherwise denies any other issues.     In the ED, ankle xray showed distal tib fib fracture. Ortho notified and plans to operate later this afternoon. Patient was admitted to .               Overview/Hospital Course:  Pt postop day 1 status post closed reduction left tibial and fibular shaft fractures with application of external fixation device. PT/OT rec SNF placement. General  surgery consulted for eval of L lateral buttock wound, hold apixaban for now.     8/6 POD#2 patient lying in bed resting, c/o lower extremity and back pain. Pt s/p bedside debridement of L lateral buttock wound per general surgery. Resume apixaban. Nephrology consulted for subtherapeutic prograf level. Continue supportive management, awaiting SNF placement.  8/7 POD #3 No acute events overnight. Per surgery patient NWB to LLE. Plan to leave the external fixation device on for approximately 6 weeks and then below-knee casting until fracture heals. Will need f/u  in ortho trauma clinic within 3 weeks. Continue supportive management. Plan SNF placement- patient  selected [facility] for placement.  Anticipate discharge within 24-48 hours.      awaiting SNF placement. Per nephrology continue prograf 1 mg every evening, 1.5 mg every morning pending repeat level.    No acute events overnight. Pt working with PT/OT, awaiting SNF.   8/10 [facility] accepted, awaiting ins authorization, continue supportive management. Consult virtual        Past Medical History:   Diagnosis Date    DINORAH (acute kidney injury) 2016    Arthritis     CAD in native artery 2019    CHF (congestive heart failure)     Chronic obstructive pulmonary disease 2016    Coronary artery disease involving native coronary artery of native heart without angina pectoris 2016    CRI (chronic renal insufficiency) 2019     donor kidney transplant for DM 16     Induction with Thymo x3 and IV solumedrol to total 875mg  Kidney Biopsy  2016: 16 glomeruli, ACR type 1 AVR type 2, significant microcirculatory changes, c4d negative, No DSA, 5 to10% fibrosis. Treated with thymo x8 2016- no rejection      Diastolic heart failure     Encounter for blood transfusion     ESRD on RRT since 10/2013 10/29/2013    Biopsy proven diabetic nephropathy and lymphoplasmacytic interstitial infiltrate not c/w with AIN (ddx sjogrens or assoc with tamm-horsefall protein extravasation)     GERD (gastroesophageal reflux disease)     History of hepatitis C, s/p successful Rx w/ SVR12 - 2017    Completed 12 weeks harvoni w/ SVR    Hyperlipidemia     Hypertension     PAD (peripheral artery disease) 2019    PIC line (peripherally inserted central catheter) flush     Prophylactic immunotherapy     Proteinuria     PVD (peripheral vascular disease) 2017    RLE BKA CT 16 Extensive atherosclerotic disease of the aorta and mesenteric arteries.     Renal hypertension     Type 2 diabetes mellitus with diabetic neuropathy, with long-term current use  of insulin 12/1/2016    Vitamin B12 deficiency        Past Surgical History:   Procedure Laterality Date    AORTOGRAPHY WITH SERIALOGRAPHY N/A 6/14/2018    Procedure: LEFT LEG ANGIOGRAM;  Surgeon: Donal Mcdonald MD;  Location: [facility] CATH LAB;  Service: Vascular;  Laterality: N/A;    APPLICATION OF LARGE EXTERNAL FIXATION DEVICE TO TIBIA Left 8/4/2022    Procedure: APPLICATION, EXTERNAL FIXATION DEVICE, LARGE, TIBIA;  Surgeon: Good Villa MD;  Location: [facility] OR;  Service: Orthopedics;  Laterality: Left;    av bovine graft      Left UE    AV FISTULA PLACEMENT      left UE    CARDIAC CATHETERIZATION  02/2015    CLOSED REDUCTION OF INJURY OF TIBIA Left 8/4/2022    Procedure: CLOSED REDUCTION, TIBIA;  Surgeon: Good Villa MD;  Location: [facility] OR;  Service: Orthopedics;  Laterality: Left;  Closed reduction left tibial and fibular shaft fractures    CLOSURE OF WOUND Left 9/24/2018    Procedure: CLOSURE, WOUND;  Surgeon: Karla Wheeler DPM;  Location: [facility] OR;  Service: Podiatry;  Laterality: Left;  Secondary Wound closure, extensive    CLOSURE OF WOUND Left 11/5/2018    Procedure: CLOSURE, WOUND;  Surgeon: Karla Wheeler DPM;  Location: [facility] OR;  Service: Podiatry;  Laterality: Left;    DEBRIDEMENT OF MULTIPLE METATARSAL BONES Left 11/5/2018    Procedure: DEBRIDEMENT, METATARSAL BONE, 2 OR MORE BONES;  Surgeon: Karla Wheeler DPM;  Location: [facility] OR;  Service: Podiatry;  Laterality: Left;    EXCISION OF SKIN Left 9/27/2019    Procedure: EXCISION, SKIN;  Surgeon: Lenard Alarcon MD;  Location: [address];  Service: Plastics;  Laterality: Left;  Plastics set, NIMS monitor, [employer]Cell    FOOT AMPUTATION THROUGH METATARSAL Left 9/21/2018    Procedure: AMPUTATION, FOOT, TRANSMETATARSAL;  Surgeon: Karla Wheeler DPM;  Location: [facility];  Service: Podiatry;  Laterality: Left;    FOOT AMPUTATION THROUGH METATARSAL Left 10/31/2018    Procedure: AMPUTATION, FOOT, TRANSMETATARSAL;  Surgeon:  Karla Wheeler DPM;  Location: [facility] OR;  Service: Podiatry;  Laterality: Left;    FOOT AMPUTATION THROUGH METATARSAL Left 11/5/2018    Procedure: AMPUTATION, FOOT, TRANSMETATARSAL;  Surgeon: Karla Wheeler DPM;  Location: [facility] OR;  Service: Podiatry;  Laterality: Left;  revisional transmetatarsal amputation, Left foot    IRRIGATION AND DEBRIDEMENT OF LOWER EXTREMITY Left 10/31/2018    Procedure: IRRIGATION AND DEBRIDEMENT, LOWER EXTREMITY;  Surgeon: Karla Wheeler DPM;  Location: [facility] OR;  Service: Podiatry;  Laterality: Left;    KIDNEY TRANSPLANT  05/21/2016    LEFT HEART CATHETERIZATION Left 7/21/2019    Procedure: CATHETERIZATION, HEART, LEFT;  Surgeon: Andrew Valdez MD;  Location: [facility] CATH LAB;  Service: Cardiology;  Laterality: Left;    LEG AMPUTATION THROUGH KNEE  2011    right LE, started as nail puncture leading to diabetic ulcer    SKIN FULL THICKNESS GRAFT Left 10/7/2019    Procedure: APPLICATION, GRAFT, SKIN, FULL-THICKNESS;  Surgeon: Lenard Alarcon MD;  Location: [address];  Service: Plastics;  Laterality: Left;    SURGICAL REMOVAL OF LESION OF FACE Right 10/7/2019    Procedure: EXCISION, LESION, FACE;  Surgeon: Lenard Alarcon MD;  Location: [facility] OR [address] [place] [address];  Service: Plastics;  Laterality: Right;       Review of patient's allergies indicates:  No Known Allergies    No current facility-administered medications on file prior to encounter.     Current Outpatient Medications on File Prior to Encounter   Medication Sig    amLODIPine (NORVASC) 10 MG tablet Take 1 tablet (10 mg total) by mouth once daily.    aspirin (ECOTRIN) 81 MG EC tablet Take 1 tablet (81 mg total) by mouth once daily.    atorvastatin (LIPITOR) 80 MG tablet Take 1 tablet (80 mg total) by mouth once daily.    clopidogreL (PLAVIX) 75 mg tablet Take 1 tablet (75 mg total) by mouth once daily.    ergocalciferol (ERGOCALCIFEROL) 50,000 unit Cap Take 1 capsule (50,000 Units total) by mouth every 7 days. Take  on Mondays    furosemide (LASIX) 40 MG tablet Take 1 tablet (40 mg total) by mouth daily as needed (Leg swelling, Nocturnal SOB).    gabapentin (NEURONTIN) 300 MG capsule Take 300 mg by mouth 3 (three) times daily.    HYDROcodone-acetaminophen (NORCO)  mg per tablet 1 tablet by Per G Tube route every 6 (six) hours as needed for Pain.    hydrocortisone 2.5 % cream Apply topically 2 (two) times daily.    insulin aspart U-100 (NOVOLOG) 100 unit/mL (3 mL) InPn pen Inject 5 units three times a day with meal if BG > 300.    levalbuterol (XOPENEX) 1.25 mg/3 mL nebulizer solution Take 3 mLs (1.25 mg total) by nebulization every 6 to 8 hours as needed for Wheezing or Shortness of Breath.    LIDOcaine (LIDODERM) 5 % Place 1 patch onto the skin daily as needed. 12 hours on and 12 hours off    metoprolol tartrate (LOPRESSOR) 25 MG tablet Take 1 tablet (25 mg total) by mouth 2 (two) times daily.    mycophenolate (CELLCEPT) 250 mg Cap Take 1 capsule (250 mg total) by mouth 2 (two) times daily.    nitroGLYCERIN (NITROSTAT) 0.4 MG SL tablet Place 1 tablet (0.4 mg total) under the tongue every 5 (five) minutes as needed.    ondansetron (ZOFRAN-ODT) 4 MG TbDL Take 1 tablet (4 mg total) by mouth every 8 (eight) hours as needed.    sevelamer carbonate (RENVELA) 800 mg Tab Take 800 mg by mouth 3 (three) times daily with meals.    tacrolimus (PROGRAF) 0.5 MG Cap Take 2 capsules (1 mg total) by mouth every 12 (twelve) hours.    cloNIDine (CATAPRES) 0.1 MG tablet Take 1 tablet (0.1 mg total) by mouth 3 (three) times daily. (Patient not taking: Reported on 8/4/2022)    flash glucose scanning reader (FREESTYLE BRYAN 14 DAY READER) Misc 1 Device by Misc.(Non-Drug; Combo Route) route as needed.    flash glucose sensor (FREESTYLE BRYAN 14 DAY SENSOR) Kit 1 kit by Misc.(Non-Drug; Combo Route) route every 14 (fourteen) days.    ipratropium (ATROVENT) 0.02 % nebulizer solution Take 2.5 mLs (500 mcg total) by nebulization 4  (four) times daily. (Patient not taking: Reported on 2022)    isosorbide mononitrate (IMDUR) 30 MG 24 hr tablet Take 2 tablets (60 mg total) by mouth once daily. (Patient not taking: Reported on 2022)    ketoconazole (NIZORAL) 2 % cream Apply topically 2 (two) times daily.    linaCLOtide (LINZESS) 72 mcg Cap capsule Take 1 capsule (72 mcg total) by mouth before breakfast.    naloxone (NARCAN) 4 mg/actuation Spry 1 spray by Nasal route once.    semaglutide (OZEMPIC) 1 mg/dose (2 mg/1.5 mL) PnIj Inject 1 mg under the skin every 7 days. (Patient taking differently: ())    sertraline (ZOLOFT) 25 MG tablet Take 1 tablet (25 mg total) by mouth once daily. For depression and anxiety (Patient not taking: Reported on 2022)    VIOS AEROSOL DELIVERY SYSTEM [place] USE Q 4 H PRN     Family History       Problem Relation (Age of Onset)    Cancer Father    Diabetes Father    Heart failure Father, Mother    Stroke Father          Tobacco Use    Smoking status: Former Smoker     Packs/day: 1.00     Years: 40.00     Pack years: 40.00     Quit date: 2013     Years since quittin.5    Smokeless tobacco: Never Used   Substance and Sexual Activity    Alcohol use: Yes     Alcohol/week: 6.0 standard drinks     Types: 6 Cans of beer per week     Comment: seldom    Drug use: No    Sexual activity: Never     Review of Systems   Constitutional:  Negative for fatigue and fever.   HENT:  Negative for sinus pressure.    Eyes:  Negative for visual disturbance.   Respiratory:  Negative for shortness of breath.    Cardiovascular:  Negative for chest pain.   Gastrointestinal:  Negative for vomiting.   Genitourinary:  Negative for difficulty urinating.   Musculoskeletal:  Positive for arthralgias (improved), back pain (chronic) and gait problem.   Skin:  Negative for rash.   Neurological:  Negative for headaches.   Psychiatric/Behavioral:  Negative for confusion.    Objective:     Vital Signs (Most  Recent):  Temp: 98.5 °F (36.9 °C) (08/10/22 1608)  Pulse: 63 (08/10/22 1608)  Resp: 17 (08/10/22 1608)  BP: (!) 142/63 (08/10/22 1608)  SpO2: 97 % (08/10/22 1608)   Vital Signs (24h Range):  Temp:  [98.1 °F (36.7 °C)-99 °F (37.2 °C)] 98.5 °F (36.9 °C)  Pulse:  [63-73] 63  Resp:  [16-20] 17  SpO2:  [92 %-97 %] 97 %  BP: (125-178)/(58-79) 142/63     Weight: 114.7 kg (252 lb 13.9 oz)  Body mass index is 35.27 kg/m².    Physical Exam  Constitutional:       General: He is not in acute distress.     Appearance: He is well-developed. He is obese. He is not diaphoretic.   HENT:      Head: Normocephalic and atraumatic.   Eyes:      Pupils: Pupils are equal, round, and reactive to light.   Cardiovascular:      Rate and Rhythm: Normal rate and regular rhythm.      Heart sounds: Normal heart sounds. No murmur heard.    No friction rub. No gallop.   Pulmonary:      Effort: Pulmonary effort is normal. No respiratory distress.      Breath sounds: Normal breath sounds. No stridor. No wheezing or rales.   Abdominal:      General: Bowel sounds are normal. There is no distension.      Palpations: Abdomen is soft. There is no mass.      Tenderness: There is no abdominal tenderness. There is no guarding.   Musculoskeletal:      Comments: Right bka, left transmetatarsal amputation, external fixator LLE   Skin:     General: Skin is warm.      Findings: No erythema.   Neurological:      Mental Status: He is alert and oriented to person, place, and time.         CRANIAL NERVES     CN III, IV, VI   Pupils are equal, round, and reactive to light.     Significant Labs:   Results for orders placed or performed during the hospital encounter of 08/04/22   CBC auto differential   Result Value Ref Range    WBC 5.41 3.90 - 12.70 K/uL    RBC 4.28 (L) 4.60 - 6.20 M/uL    Hemoglobin 13.0 (L) 14.0 - 18.0 g/dL    Hematocrit 42.6 40.0 - 54.0 %     (H) 82 - 98 fL    MCH 30.4 27.0 - 31.0 pg    MCHC 30.5 (L) 32.0 - 36.0 g/dL    RDW 13.5 11.5 - 14.5 %     Platelets 201 150 - 450 K/uL    MPV 9.2 9.2 - 12.9 fL    Immature Granulocytes 0.4 0.0 - 0.5 %    Gran # (ANC) 3.7 1.8 - 7.7 K/uL    Immature Grans (Abs) 0.02 0.00 - 0.04 K/uL    Lymph # 1.0 1.0 - 4.8 K/uL    Mono # 0.6 0.3 - 1.0 K/uL    Eos # 0.0 0.0 - 0.5 K/uL    Baso # 0.02 0.00 - 0.20 K/uL    nRBC 0 0 /100 WBC    Gran % 69.1 38.0 - 73.0 %    Lymph % 18.1 18.0 - 48.0 %    Mono % 11.8 4.0 - 15.0 %    Eosinophil % 0.2 0.0 - 8.0 %    Basophil % 0.4 0.0 - 1.9 %    Differential Method Automated    Comprehensive metabolic panel   Result Value Ref Range    Sodium 136 136 - 145 mmol/L    Potassium 4.9 3.5 - 5.1 mmol/L    Chloride 102 95 - 110 mmol/L    CO2 24 23 - 29 mmol/L    Glucose 250 (H) 70 - 110 mg/dL    BUN 30 (H) 8 - 23 mg/dL    Creatinine 1.8 (H) 0.5 - 1.4 mg/dL    Calcium 8.4 (L) 8.7 - 10.5 mg/dL    Total Protein 6.7 6.0 - 8.4 g/dL    Albumin 3.0 (L) 3.5 - 5.2 g/dL    Total Bilirubin 0.5 0.1 - 1.0 mg/dL    Alkaline Phosphatase 156 (H) 55 - 135 U/L     (H) 10 - 40 U/L    ALT 73 (H) 10 - 44 U/L    Anion Gap 10 8 - 16 mmol/L    eGFR 40 (A) >60 mL/min/1.73 m^2   COVID-19 Rapid Screening   Result Value Ref Range    SARS-CoV-2 RNA, Amplification, Qual Negative Negative   Hemoglobin A1c if not done in past 3 months   Result Value Ref Range    Hemoglobin A1C 7.4 (H) 4.0 - 5.6 %    Estimated Avg Glucose 166 (H) 68 - 131 mg/dL   CBC Auto Differential   Result Value Ref Range    WBC 4.67 3.90 - 12.70 K/uL    RBC 3.76 (L) 4.60 - 6.20 M/uL    Hemoglobin 11.7 (L) 14.0 - 18.0 g/dL    Hematocrit 37.4 (L) 40.0 - 54.0 %     (H) 82 - 98 fL    MCH 31.1 (H) 27.0 - 31.0 pg    MCHC 31.3 (L) 32.0 - 36.0 g/dL    RDW 13.4 11.5 - 14.5 %    Platelets 155 150 - 450 K/uL    MPV 9.4 9.2 - 12.9 fL    Immature Granulocytes 0.2 0.0 - 0.5 %    Gran # (ANC) 3.0 1.8 - 7.7 K/uL    Immature Grans (Abs) 0.01 0.00 - 0.04 K/uL    Lymph # 1.0 1.0 - 4.8 K/uL    Mono # 0.7 0.3 - 1.0 K/uL    Eos # 0.0 0.0 - 0.5 K/uL    Baso # 0.01 0.00 -  0.20 K/uL    nRBC 0 0 /100 WBC    Gran % 64.5 38.0 - 73.0 %    Lymph % 21.0 18.0 - 48.0 %    Mono % 13.9 4.0 - 15.0 %    Eosinophil % 0.2 0.0 - 8.0 %    Basophil % 0.2 0.0 - 1.9 %    Differential Method Automated    Basic Metabolic Panel   Result Value Ref Range    Sodium 135 (L) 136 - 145 mmol/L    Potassium 4.2 3.5 - 5.1 mmol/L    Chloride 105 95 - 110 mmol/L    CO2 21 (L) 23 - 29 mmol/L    Glucose 159 (H) 70 - 110 mg/dL    BUN 26 (H) 8 - 23 mg/dL    Creatinine 1.5 (H) 0.5 - 1.4 mg/dL    Calcium 7.7 (L) 8.7 - 10.5 mg/dL    Anion Gap 9 8 - 16 mmol/L    eGFR 50 (A) >60 mL/min/1.73 m^2   Magnesium   Result Value Ref Range    Magnesium 1.8 1.6 - 2.6 mg/dL   Tacrolimus level   Result Value Ref Range    Tacrolimus Lvl 2.0 (L) 5.0 - 15.0 ng/mL   Comprehensive Metabolic Panel   Result Value Ref Range    Sodium 135 (L) 136 - 145 mmol/L    Potassium 4.2 3.5 - 5.1 mmol/L    Chloride 105 95 - 110 mmol/L    CO2 23 23 - 29 mmol/L    Glucose 163 (H) 70 - 110 mg/dL    BUN 15 8 - 23 mg/dL    Creatinine 1.1 0.5 - 1.4 mg/dL    Calcium 7.8 (L) 8.7 - 10.5 mg/dL    Total Protein 5.3 (L) 6.0 - 8.4 g/dL    Albumin 2.3 (L) 3.5 - 5.2 g/dL    Total Bilirubin 0.7 0.1 - 1.0 mg/dL    Alkaline Phosphatase 120 55 - 135 U/L    AST 35 10 - 40 U/L    ALT 19 10 - 44 U/L    Anion Gap 7 (L) 8 - 16 mmol/L    eGFR >60 >60 mL/min/1.73 m^2   Tacrolimus level   Result Value Ref Range    Tacrolimus Lvl 6.4 5.0 - 15.0 ng/mL   Renal Function Panel   Result Value Ref Range    Glucose 146 (H) 70 - 110 mg/dL    Sodium 135 (L) 136 - 145 mmol/L    Potassium 4.2 3.5 - 5.1 mmol/L    Chloride 104 95 - 110 mmol/L    CO2 23 23 - 29 mmol/L    BUN 16 8 - 23 mg/dL    Calcium 7.9 (L) 8.7 - 10.5 mg/dL    Creatinine 1.2 0.5 - 1.4 mg/dL    Albumin 2.3 (L) 3.5 - 5.2 g/dL    Phosphorus 2.2 (L) 2.7 - 4.5 mg/dL    eGFR >60 >60 mL/min/1.73 m^2    Anion Gap 8 8 - 16 mmol/L   Magnesium   Result Value Ref Range    Magnesium 2.0 1.6 - 2.6 mg/dL   Renal Function Panel   Result Value  Ref Range    Glucose 119 (H) 70 - 110 mg/dL    Sodium 135 (L) 136 - 145 mmol/L    Potassium 4.2 3.5 - 5.1 mmol/L    Chloride 103 95 - 110 mmol/L    CO2 24 23 - 29 mmol/L    BUN 14 8 - 23 mg/dL    Calcium 8.0 (L) 8.7 - 10.5 mg/dL    Creatinine 1.2 0.5 - 1.4 mg/dL    Albumin 2.3 (L) 3.5 - 5.2 g/dL    Phosphorus 2.2 (L) 2.7 - 4.5 mg/dL    eGFR >60 >60 mL/min/1.73 m^2    Anion Gap 8 8 - 16 mmol/L   Tacrolimus level   Result Value Ref Range    Tacrolimus Lvl 6.6 5.0 - 15.0 ng/mL   Renal Function Panel   Result Value Ref Range    Glucose 144 (H) 70 - 110 mg/dL    Sodium 134 (L) 136 - 145 mmol/L    Potassium 4.3 3.5 - 5.1 mmol/L    Chloride 103 95 - 110 mmol/L    CO2 24 23 - 29 mmol/L    BUN 14 8 - 23 mg/dL    Calcium 8.1 (L) 8.7 - 10.5 mg/dL    Creatinine 1.1 0.5 - 1.4 mg/dL    Albumin 2.3 (L) 3.5 - 5.2 g/dL    Phosphorus 2.1 (L) 2.7 - 4.5 mg/dL    eGFR >60 >60 mL/min/1.73 m^2    Anion Gap 7 (L) 8 - 16 mmol/L   Tacrolimus level   Result Value Ref Range    Tacrolimus Lvl 6.5 5.0 - 15.0 ng/mL   CBC auto differential   Result Value Ref Range    WBC 3.38 (L) 3.90 - 12.70 K/uL    RBC 3.56 (L) 4.60 - 6.20 M/uL    Hemoglobin 11.2 (L) 14.0 - 18.0 g/dL    Hematocrit 35.0 (L) 40.0 - 54.0 %    MCV 98 82 - 98 fL    MCH 31.5 (H) 27.0 - 31.0 pg    MCHC 32.0 32.0 - 36.0 g/dL    RDW 13.5 11.5 - 14.5 %    Platelets 127 (L) 150 - 450 K/uL    MPV 9.7 9.2 - 12.9 fL    Immature Granulocytes 0.3 0.0 - 0.5 %    Gran # (ANC) 1.9 1.8 - 7.7 K/uL    Immature Grans (Abs) 0.01 0.00 - 0.04 K/uL    Lymph # 0.9 (L) 1.0 - 4.8 K/uL    Mono # 0.6 0.3 - 1.0 K/uL    Eos # 0.0 0.0 - 0.5 K/uL    Baso # 0.01 0.00 - 0.20 K/uL    nRBC 0 0 /100 WBC    Gran % 55.3 38.0 - 73.0 %    Lymph % 25.7 18.0 - 48.0 %    Mono % 17.2 (H) 4.0 - 15.0 %    Eosinophil % 1.2 0.0 - 8.0 %    Basophil % 0.3 0.0 - 1.9 %    Differential Method Automated    Echo Saline Bubble? No   Result Value Ref Range    BSA 2.27 m2    TDI SEPTAL 0.09 m/s    LV LATERAL E/E' RATIO 8.45 m/s    LV SEPTAL  E/E' RATIO 10.33 m/s    LA WIDTH 3.60 cm    IVC diameter 1.30 cm    Left Ventricular Outflow Tract Mean Velocity [id number] cm/s    Left Ventricular Outflow Tract Mean Gradient 2.13 mmHg    TDI LATERAL 0.11 m/s    LVIDd 3.72 3.5 - 6.0 cm    IVS 1.59 (A) 0.6 - 1.1 cm    Posterior Wall 1.78 (A) 0.6 - 1.1 cm    Ao root annulus 3.36 cm    LVIDs 2.45 2.1 - 4.0 cm    FS 34 28 - 44 %    LA volume 57.29 cm3    Sinus 3.49 cm    STJ 3.49 cm    Ascending aorta 3.28 cm    LV mass 254.30 g    LA size 3.62 cm    Left Ventricle Relative Wall Thickness 0.96 cm    AV mean gradient 3 mmHg    AV valve area 2.80 cm2    AV Velocity Ratio 0.75     AV index (prosthetic) 0.75     MV valve area p 1/2 method 3.53 cm2    E/A ratio 0.81     Mean e' 0.10 m/s    E wave deceleration time [id number] msec    IVRT 82.725960504 msec    LVOT diameter 2.18 cm    LVOT area 3.7 cm2    LVOT peak honorio 0.84 m/s    LVOT peak VTI 18.60 cm    Ao peak honorio 1.12 m/s    Ao VTI 24.8 cm    RVOT peak honorio 0.91 m/s    RVOT peak VTI 18.1 cm    LVOT stroke volume 69.39 cm3    AV peak gradient 5 mmHg    PV mean gradient 2.63 mmHg    E/E' ratio 9.30 m/s    MV Peak E Honorio 0.93 m/s    TR Max Honorio 2.53 m/s    MV stenosis pressure 1/2 time [id number] ms    MV Peak A Honorio 1.15 m/s    LV Systolic Volume 21.24 mL    LV Systolic Volume Index 9.6 mL/m2    LV Diastolic Volume 58.87 mL    LV Diastolic Volume Index 26.52 mL/m2    LA Volume Index 25.8 mL/m2    LV Mass Index 115 g/m2    RA Major Axis 4.42 cm    Left Atrium Minor Axis 4.79 cm    Left Atrium Major Axis 5.62 cm    Triscuspid Valve Regurgitation Peak Gradient 26 mmHg    RA Width 2.57 cm    Right Atrial Pressure (from IVC) 3 mmHg    EF 60 %    TV rest pulmonary artery pressure 29 mmHg   POCT glucose   Result Value Ref Range    POCT Glucose 263 (H) 70 - 110 mg/dL   POCT glucose   Result Value Ref Range    POCT Glucose 264 (H) 70 - 110 mg/dL   POCT glucose   Result Value Ref Range    POCT Glucose 192 (H) 70 -  110 mg/dL   POCT glucose   Result Value Ref Range    POCT Glucose 210 (H) 70 - 110 mg/dL   POCT glucose   Result Value Ref Range    POCT Glucose 157 (H) 70 - 110 mg/dL   POCT glucose   Result Value Ref Range    POCT Glucose 178 (H) 70 - 110 mg/dL   POCT glucose   Result Value Ref Range    POCT Glucose 193 (H) 70 - 110 mg/dL   POCT glucose   Result Value Ref Range    POCT Glucose 189 (H) 70 - 110 mg/dL   POCT glucose   Result Value Ref Range    POCT Glucose 183 (H) 70 - 110 mg/dL   POCT glucose   Result Value Ref Range    POCT Glucose 162 (H) 70 - 110 mg/dL   POCT glucose   Result Value Ref Range    POCT Glucose 160 (H) 70 - 110 mg/dL   POCT glucose   Result Value Ref Range    POCT Glucose 152 (H) 70 - 110 mg/dL   POCT glucose   Result Value Ref Range    POCT Glucose 143 (H) 70 - 110 mg/dL   POCT glucose   Result Value Ref Range    POCT Glucose 163 (H) 70 - 110 mg/dL   POCT glucose   Result Value Ref Range    POCT Glucose 166 (H) 70 - 110 mg/dL   POCT glucose   Result Value Ref Range    POCT Glucose 150 (H) 70 - 110 mg/dL   POCT glucose   Result Value Ref Range    POCT Glucose 123 (H) 70 - 110 mg/dL   POCT glucose   Result Value Ref Range    POCT Glucose 165 (H) 70 - 110 mg/dL   POCT glucose   Result Value Ref Range    POCT Glucose 188 (H) 70 - 110 mg/dL   POCT glucose   Result Value Ref Range    POCT Glucose 183 (H) 70 - 110 mg/dL   POCT glucose   Result Value Ref Range    POCT Glucose 145 (H) 70 - 110 mg/dL   POCT glucose   Result Value Ref Range    POCT Glucose 210 (H) 70 - 110 mg/dL   POCT glucose   Result Value Ref Range    POCT Glucose 234 (H) 70 - 110 mg/dL   POCT glucose   Result Value Ref Range    POCT Glucose 195 (H) 70 - 110 mg/dL   POCT glucose   Result Value Ref Range    POCT Glucose 184 (H) 70 - 110 mg/dL   POCT glucose   Result Value Ref Range    POCT Glucose 191 (H) 70 - 110 mg/dL   POCT glucose   Result Value Ref Range    POCT Glucose 209 (H) 70 - 110 mg/dL     *Note: Due to a large number of results  and/or encounters for the requested time period, some results have not been displayed. A complete set of results can be found in Results Review.        Significant Imaging: X-Ray Ankle Complete Left  Narrative: EXAMINATION:  XR ANKLE COMPLETE 3 VIEW LEFT    CLINICAL HISTORY:  intraop;    COMPARISON:  None  Impression: FINDINGS/  Intraoperative fluoroscopic images were obtained.    Total fluoro time was 1.4 minute and 2 fluoroscopic images were obtained.  See operative report.    Electronically signed by: Ryan Joya MD  Date:    08/04/2022  Time:    15:55  SURG FL Surgery Fluoro Usage  See OP Notes for results.     IMPRESSION: See OP Notes for results.     This procedure was auto-finalized by: Virtual Radiologist  Echo Saline Bubble? No  · The left ventricle is normal in size with moderate concentric   hypertrophy and normal systolic function.  · The estimated ejection fraction is 60%.  · Normal left ventricular diastolic function.  · Normal right ventricular size with normal right ventricular systolic   function.  · Mild tricuspid regurgitation.  · Normal central venous pressure (3 mmHg).  · The estimated PA systolic pressure is 29 mmHg.     X-Ray Chest AP Portable  Narrative: EXAMINATION:  XR CHEST AP PORTABLE    CLINICAL HISTORY:  Encounter for other preprocedural examination    FINDINGS:  Single view of the chest.  08/15/2021 comparison    Cardiac silhouette is normal.  Aorta demonstrates atherosclerotic disease. The lungs demonstrate no evidence of active disease.  Mild bibasilar atelectasis.  No evidence of pleural effusion or pneumothorax.  Bones appear intact demonstrate scattered degenerative change.  Impression: No acute process seen.    Electronically signed by: Ryan Joya MD  Date:    08/04/2022  Time:    07:14  X-Ray Tibia Fibula 2 View Left  Narrative: EXAMINATION:  XR TIBIA FIBULA 2 VIEW LEFT; XR ANKLE COMPLETE 3 VIEW LEFT    CLINICAL HISTORY:  XR TIBIA FIBULA 2 VIEW LEFT; XR ANKLE COMPLETE 3  VIEW LEFTUnspecified fall, initial encounter    COMPARISON:  None    FINDINGS:  Four views of the left tibia/fibula and three views of the left ankle were obtained.    Comminuted tibial metaphyseal fracture with angulation and mild displacement.  There is extension to the medial articular surface and involvement of the interosseous membrane between the tibia and fibula.  Transverse fracture of the distal fibula above the lateral malleolus with mild displacement and angulation.  Mildly comminuted proximal fibular fracture.  Moderate joint effusion of the ankle.  Small avulsion injury at the tip of the medial malleolus    Diffuse osteopenia and moderate soft tissue swelling.  Dense vascular calcifications.  Moderate degenerative changes of the ankle and knee joints.  Advanced degenerative changes within the midfoot.  Large plantar calcaneal spur.  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  Impression: See above.    Electronically signed by: Ryan Joya MD  Date:    08/04/2022  Time:    06:59  X-Ray Ankle Complete Left  Narrative: EXAMINATION:  XR TIBIA FIBULA 2 VIEW LEFT; XR ANKLE COMPLETE 3 VIEW LEFT    CLINICAL HISTORY:  XR TIBIA FIBULA 2 VIEW LEFT; XR ANKLE COMPLETE 3 VIEW LEFTUnspecified fall, initial encounter    COMPARISON:  None    FINDINGS:  Four views of the left tibia/fibula and three views of the left ankle were obtained.    Comminuted tibial metaphyseal fracture with angulation and mild displacement.  There is extension to the medial articular surface and involvement of the interosseous membrane between the tibia and fibula.  Transverse fracture of the distal fibula above the lateral malleolus with mild displacement and angulation.  Mildly comminuted proximal fibular fracture.  Moderate joint effusion of the ankle.  Small avulsion injury at the tip of the medial malleolus    Diffuse osteopenia and moderate soft tissue swelling.  Dense vascular calcifications.   Moderate degenerative changes of the ankle and knee joints.  Advanced degenerative changes within the midfoot.  Large plantar calcaneal spur.  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  Impression: See above.    Electronically signed by: Ryan Joya MD  Date:    08/04/2022  Time:    06:59        Assessment/Plan:      * Closed fracture of left tibia and fibula  Patient with distal tib fib fracture   Ortho consulted on case and plans to operate later today  Cont npo  Morphine PRN  Patient with numerous comorbid conditions including  Cad, pvd, copd, diabetes  Poor functional capacity at home with METS < 4  Chest xray unremarkable  Ekg showed first degree AV block  Will obtain stat echo to evaluate LV function  Pt at least moderate risk for cardiac complications    Advance Care Planning     Patient is a full code and sdm is his sister.     8/5/22 POD#1   NWB LLE per ortho recs Plan to leave the external fixation device on for approximately 6 weeks and then below-knee casting until fracture heals.  Continue pain management  PT/OT rec SNF     Pressure injury of left hip, stage 2  S/p bedside debridement per general surgery       Severe central sleep apnea comorbid with prescribed opioid use  cpap qhs       Kidney transplant recipient  Resume tacrolimus  Hold cellcept     8/6   Prograf level subtherapeutic, consult nephrology     Type 2 diabetes mellitus with stable proliferative retinopathy of both eyes, with long-term current use of insulin  Patient's FSGs are uncontrolled due to hyperglycemia on current medication regimen.  Last A1c reviewed-   Lab Results   Component Value Date    HGBA1C 7.4 (H) 08/04/2022     Most recent fingerstick glucose reviewed-   Recent Labs   Lab 08/09/22  2022 08/10/22  0514 08/10/22  1156 08/10/22  1705   POCTGLUCOSE 195* 184* 191* 209*     Current correctional scale  High  Maintain anti-hyperglycemic dose as follows-   Antihyperglycemics (From  admission, onward)            Start     Stop Route Frequency Ordered    08/04/22 0845  insulin aspart U-100 pen 1-10 Units         -- SubQ Every 6 hours PRN 08/04/22 0745        Hold Oral hypoglycemics while patient is in the hospital.    Coronary artery disease of native artery of native heart with stable angina pectoris  Resume statin   Hold asa and plavix         Osteoarthritis of lumbar spine  Chronic pain  Seeing pain management outpatient       Essential hypertension  Resume home metoprolol and amlodipine   Hydralazine PRN        VTE Risk Mitigation (From admission, onward)         Ordered     apixaban tablet 2.5 mg  2 times daily         08/08/22 1416     IP VTE HIGH RISK PATIENT  Once         08/04/22 1601     Place sequential compression device  Until discontinued         08/04/22 0608                Discharge Planning   LISETH: 8/12/22    Code Status: Full Code   Is the patient medically ready for discharge?:     Reason for patient still in hospital (select all that apply): Pending disposition awaiting ins auth for SNF placement   Discharge Plan A: Skilled Nursing Facility                  Sherita Christensen NP  Department of Hospital Medicine   [facility] - Med Surg

## 2022-08-10 NOTE — PLAN OF CARE
Pt min A with bed mobility, sat EOB ~10 minutes to perform x10 reps BUE therex.   Pt attempted slide board today to BSC, performing 3 scoots to the R with min A. Pt limited in t/f by IV placement in R wrist.   Continue OT POC.

## 2022-08-11 NOTE — PHYSICIAN QUERY
Name: Lavelle Ladd  MR #: [id number]    DOCUMENTATION CLARIFICATION     CDS/: MARLYN Fisher, RN               Contact information:[email or web address]    This form is a permanent document in the medical record.    Query Date: August 11, 2022    By submitting this query, we are merely seeking further clarification of documentation. Please utilize your independent clinical judgment when addressing the question(s) below.    The Medical Record contains the following:   Indicators Supporting Clinical Findings Location in Medical Record    x Fracture documented sustained closed fractures of the left distal tibial shaft and fibular shaft.  He is a wheelchair ambulator and fell transferring from bed to chair at home.  Was on the floor for 5 hours before coming to the emergency room.     Orthopedic Surgery H&P 08/04/2022    x Osteoporosis, malignancy documented Bony structures are very osteoporotic.  Orthopedic Surgery H&P 08/04/2022      x Radiology Findings Comminuted tibial metaphyseal fracture with angulation and mild displacement.  There is extension to the medial articular surface and involvement of the interosseous membrane between the tibia and fibula.  Transverse fracture of the distal fibula above the lateral malleolus with mild displacement and angulation.  Mildly comminuted proximal fibular fracture.  Moderate joint effusion of the ankle.  Small avulsion injury at the tip of the medial malleolus.   Diffuse osteopenia and moderate soft tissue swelling.  Dense vascular calcifications.  Moderate degenerative changes of the ankle and knee joints.  Advanced degenerative changes within the midfoot.  Large plantar calcaneal spur.  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   Orthopedic Surgery H&P 08/04/2022    x Treatment/Medication 1. Closed reduction left tibial and fibular shaft fractures  2. Application of external fixation left tibia     Anticipate leaving the  external fixator in place for about 6 weeks and then consider below-knee casting until fracture healing.  Continue with nonweightbearing status. Orthopedic Surgery Op Note 08/04/2022        Orthopedic Surgery Progress Notes 08/11/2022    Other       Provider, please clarify if there is any clinical correlation between   _Osteoporsis_ and _Left tibia/fibula fracture_______.    [   ] Due to or associated with each other   [  x ] Unrelated to each other   [   ] Other explanation (please specify): _________   [  ] Clinically undetermined       Please document in your progress notes daily for the duration of treatment, until resolved, and include in your discharge summary.    Form No. 11096

## 2022-08-11 NOTE — PLAN OF CARE
Problem: Adult Inpatient Plan of Care  Goal: Plan of Care Review  Outcome: Ongoing, Progressing  Goal: Patient-Specific Goal (Individualized)  Outcome: Ongoing, Progressing  Goal: Absence of Hospital-Acquired Illness or Injury  Outcome: Ongoing, Progressing  Goal: Optimal Comfort and Wellbeing  Outcome: Ongoing, Progressing  Goal: Readiness for Transition of Care  Outcome: Ongoing, Progressing     Problem: Fluid Imbalance (Pneumonia)  Goal: Fluid Balance  Outcome: Ongoing, Progressing     Problem: Infection (Pneumonia)  Goal: Resolution of Infection Signs and Symptoms  Outcome: Ongoing, Progressing     Problem: Respiratory Compromise (Pneumonia)  Goal: Effective Oxygenation and Ventilation  Outcome: Ongoing, Progressing     Problem: Diabetes Comorbidity  Goal: Blood Glucose Level Within Targeted Range  Outcome: Ongoing, Progressing     Problem: Skin Injury Risk Increased  Goal: Skin Health and Integrity  Outcome: Ongoing, Progressing     Problem: Impaired Wound Healing  Goal: Optimal Wound Healing  Outcome: Ongoing, Progressing     Problem: Fall Injury Risk  Goal: Absence of Fall and Fall-Related Injury  Outcome: Ongoing, Progressing     Problem: Pain Acute  Goal: Acceptable Pain Control and Functional Ability  Outcome: Ongoing, Progressing

## 2022-08-11 NOTE — CONSULTS
Thank you for your consult to [facility]. We have reviewed the patient chart. This patient does meet criteria for [facility] service at this time. Will assume care on 08/11/22 at 7AM     Lito Richardson MD  [facility]

## 2022-08-11 NOTE — PHYSICIAN QUERY
"PT Name: Lavelle Ladd  MR #: [id number]    DOCUMENTATION CLARIFICATION     CDS/: MARLYN Fisher, RN               Contact information:[email or web address]  This form is a permanent document in the medical record.    Query Date: August 11, 2022  By submitting this query, we are merely seeking further clarification of documentation. Please utilize your independent clinical judgment when addressing the question(s) below.    The Medical Record contains the following:   Indicator Supporting Clinical Findings Location in Medical Record    x Documentation of "Debridement"             Sharp debridement of skin  A scalpel blade was then used to sharply debride the epidermis down to the layers of the subcutaneous tissue.   The total area debrided was 2 x 6 cm with a additional 0.5 cm x 2 areas debrided inferiorly and medially to the larger area. General Surgery Procedure Notes 08/06/2022    Documentation of "I&D"      Other       Excisional debridement is the surgical removal or cutting away of such tissue, necrosis, or slough and is classified to the root operation "Excision." Use of a sharp instrument does not always indicate that an excisional debridement was performed. Minor removal of loose fragments with scissors or using a sharp instrument to scrape away tissue is not an excisional debridement. Excisional debridement involves the use of a scalpel to remove devitalized tissue.  Nonexcisional debridement is the nonoperative brushing, irrigating, scrubbing, or washing of devitalized tissue, necrosis, slough, or foreign material. Most nonexcisional debridement procedures are classified to the root operation "Extraction" (pulling or stripping out or off all or a portion of a body part by the use of force).     Provider, please provide further clarification on the procedure performed on _(Left hip and buttock)             :    [  x ] Excisional Debridement of subcutaneous tissue/fascia        [   ] Non-excisional " Debridement of subcutaneous tissue/fascia     [   ] Other Procedure (please specify): _____________   [  ] Clinically Undetermined     Reference:    ICD-10-CM/PCS Coding Clinic Third Quarter ICD-10, Effective with discharges: October 7, 2015 [facility] [employer] § Excisional and nonexcisional debridement (2015).    Form No. [id number]

## 2022-08-11 NOTE — PLAN OF CARE
Pt remains free from falls/injuries this shift. Safety precautions maintained. Pain managed with PRN meds. No s/s of acute distress noted. Will continue to monitor. Chart check completed.

## 2022-08-11 NOTE — CONSULTS
[facility] Surg  Nephrology  Consult Note    Patient Name: Lavelle Ladd  MRN: [id number]  Admission Date: 8/4/2022  Hospital Length of Stay: 7 days  Attending Provider: Krishna Zamora MD   Primary Care Physician: Yahir Calzada MD  Principal Problem:Closed fracture of left tibia and fibula    Consults  Subjective:     HPI:  67-year-old male who is admitted after falling and sustaining a lower extremity fracture.  Now status post external fixation.  Has history of kidney transplant.  Nephrology has been consulted for assistance with management of immunosuppression and kidney transplant.  The patient was seen in his hospital room.  In bed resting comfortably.  No acute distress noted.  He underwent kidney transplant in May of 2016. He states that he has not seen a general nephrologist or a transplant nephrologist in almost 2 years.  He states that no one has been following his Prograf levels.  He does not remember his home dose of Prograf.    08/08/2022:  Patient was seen in his hospital room.  In bed resting comfortably.  No acute distress noted.  Discussed nephrology related issues with he and his sister.  All nephrology related questions were answered to their satisfaction.    Review of systems:  No shortness of breath or chest discomfort.  No fevers or chills.  No nausea vomiting diarrhea.    08/09/2022:  Patient was seen in his hospital room.  In bed resting comfortably.  No acute distress noted.  No new complaints from overnight.    08/10/2022:  Patient was seen in his hospital room.  Reports no events from overnight.  No new complaints this morning.  His sister was at the bedside.  All nephrology related questions were answered to their satisfaction.    08/11/2022:  Patient was seen in his hospital room.  In bed resting comfortably.  No acute distress noted.  Has a headache this morning.    Past Medical History:   Diagnosis Date    DINORAH (acute kidney injury) 9/25/2016    Arthritis     CAD in native artery  2019    CHF (congestive heart failure)     Chronic obstructive pulmonary disease 2016    Coronary artery disease involving native coronary artery of native heart without angina pectoris 2016    CRI (chronic renal insufficiency) 2019     donor kidney transplant for DM 16     Induction with Thymo x3 and IV solumedrol to total 875mg  Kidney Biopsy  2016: 16 glomeruli, ACR type 1 AVR type 2, significant microcirculatory changes, c4d negative, No DSA, 5 to10% fibrosis. Treated with thymo x8 2016- no rejection      Diastolic heart failure     Encounter for blood transfusion     ESRD on RRT since 10/2013 10/29/2013    Biopsy proven diabetic nephropathy and lymphoplasmacytic interstitial infiltrate not c/w with AIN (ddx sjogrens or assoc with tamm-horsefall protein extravasation)     GERD (gastroesophageal reflux disease)     History of hepatitis C, s/p successful Rx w/ SVR12 - 2017    Completed 12 weeks harvoni w/ SVR    Hyperlipidemia     Hypertension     PAD (peripheral artery disease) 2019    PIC line (peripherally inserted central catheter) flush     Prophylactic immunotherapy     Proteinuria     PVD (peripheral vascular disease) 2017    RLE BKA CT 16 Extensive atherosclerotic disease of the aorta and mesenteric arteries.     Renal hypertension     Type 2 diabetes mellitus with diabetic neuropathy, with long-term current use of insulin 2016    Vitamin B12 deficiency        Past Surgical History:   Procedure Laterality Date    AORTOGRAPHY WITH SERIALOGRAPHY N/A 2018    Procedure: LEFT LEG ANGIOGRAM;  Surgeon: Donal Mcdonald MD;  Location: [facility] CATH LAB;  Service: Vascular;  Laterality: N/A;    APPLICATION OF LARGE EXTERNAL FIXATION DEVICE TO TIBIA Left 2022    Procedure: APPLICATION, EXTERNAL FIXATION DEVICE, LARGE, TIBIA;  Surgeon: Good Villa MD;  Location: [facility] OR;  Service: Orthopedics;  Laterality: Left;     av bovine graft      Left UE    AV FISTULA PLACEMENT      left UE    CARDIAC CATHETERIZATION  02/2015    CLOSED REDUCTION OF INJURY OF TIBIA Left 8/4/2022    Procedure: CLOSED REDUCTION, TIBIA;  Surgeon: Good Villa MD;  Location: [facility] OR;  Service: Orthopedics;  Laterality: Left;  Closed reduction left tibial and fibular shaft fractures    CLOSURE OF WOUND Left 9/24/2018    Procedure: CLOSURE, WOUND;  Surgeon: Karla Wheeler DPM;  Location: [facility] OR;  Service: Podiatry;  Laterality: Left;  Secondary Wound closure, extensive    CLOSURE OF WOUND Left 11/5/2018    Procedure: CLOSURE, WOUND;  Surgeon: Krala Wheeler DPM;  Location: [facility] OR;  Service: Podiatry;  Laterality: Left;    DEBRIDEMENT OF MULTIPLE METATARSAL BONES Left 11/5/2018    Procedure: DEBRIDEMENT, METATARSAL BONE, 2 OR MORE BONES;  Surgeon: Karla Wheeler DPM;  Location: [facility] OR;  Service: Podiatry;  Laterality: Left;    EXCISION OF SKIN Left 9/27/2019    Procedure: EXCISION, SKIN;  Surgeon: Lenard Alarcon MD;  Location: [address];  Service: Plastics;  Laterality: Left;  Plastics set, NIMS monitor, [employer]    FOOT AMPUTATION THROUGH METATARSAL Left 9/21/2018    Procedure: AMPUTATION, FOOT, TRANSMETATARSAL;  Surgeon: Karla Wheeler DPM;  Location: [facility] OR;  Service: Podiatry;  Laterality: Left;    FOOT AMPUTATION THROUGH METATARSAL Left 10/31/2018    Procedure: AMPUTATION, FOOT, TRANSMETATARSAL;  Surgeon: Karla Wheeler DPM;  Location: [facility] OR;  Service: Podiatry;  Laterality: Left;    FOOT AMPUTATION THROUGH METATARSAL Left 11/5/2018    Procedure: AMPUTATION, FOOT, TRANSMETATARSAL;  Surgeon: Karla Wheeler DPM;  Location: [facility] OR;  Service: Podiatry;  Laterality: Left;  revisional transmetatarsal amputation, Left foot    IRRIGATION AND DEBRIDEMENT OF LOWER EXTREMITY Left 10/31/2018    Procedure: IRRIGATION AND DEBRIDEMENT, LOWER EXTREMITY;  Surgeon: Karla Wheeler DPM;  Location: [facility] OR;  Service:  Podiatry;  Laterality: Left;    KIDNEY TRANSPLANT  05/21/2016    LEFT HEART CATHETERIZATION Left 7/21/2019    Procedure: CATHETERIZATION, HEART, LEFT;  Surgeon: Andrew Valdez MD;  Location: [facility] CATH LAB;  Service: Cardiology;  Laterality: Left;    LEG AMPUTATION THROUGH KNEE  2011    right LE, started as nail puncture leading to diabetic ulcer    SKIN FULL THICKNESS GRAFT Left 10/7/2019    Procedure: APPLICATION, GRAFT, SKIN, FULL-THICKNESS;  Surgeon: Lenard Alarcon MD;  Location: [facility] OR [address] [place] [address];  Service: Plastics;  Laterality: Left;    SURGICAL REMOVAL OF LESION OF FACE Right 10/7/2019    Procedure: EXCISION, LESION, FACE;  Surgeon: Lenard Alarcon MD;  Location: [facility] OR [address] [place] [address];  Service: Plastics;  Laterality: Right;       Review of patient's allergies indicates:  No Known Allergies  Current Facility-Administered Medications   Medication Frequency    acetaminophen tablet 650 mg Q4H PRN    albuterol-ipratropium 2.5 mg-0.5 mg/3 mL nebulizer solution 3 mL Q4H PRN    aluminum-magnesium hydroxide-simethicone 200-200-20 mg/5 mL suspension 30 mL Q6H PRN    amLODIPine tablet 10 mg Daily    apixaban tablet 2.5 mg BID    atorvastatin tablet 80 mg Daily    benzonatate capsule 100 mg TID PRN    collagenase ointment Daily    dextrose 10% bolus 125 mL PRN    dextrose 10% bolus 250 mL PRN    fluticasone propionate 50 mcg/actuation nasal spray 100 mcg Daily    glucagon (human recombinant) injection 1 mg PRN    guaiFENesin 100 mg/5 ml syrup 200 mg Q4H PRN    hydrALAZINE injection 10 mg Q8H PRN    insulin aspart U-100 pen 1-10 Units Q6H PRN    melatonin tablet 6 mg Nightly PRN    methocarbamoL tablet 500 mg QID    metoprolol tartrate (LOPRESSOR) tablet 25 mg BID    morphine injection 2 mg Q2H PRN    mupirocin 2 % ointment Daily    ondansetron disintegrating tablet 8 mg Q8H PRN    oxyCODONE-acetaminophen  mg per tablet 1 tablet Q4H PRN    sertraline tablet 25 mg Daily    sodium chloride 0.9%  flush 10 mL PRN    sodium chloride 0.9% flush 10 mL PRN    sodium chloride 0.9% flush 10 mL PRN    sodium hypochlorite 0.125% external solution BID    tacrolimus capsule 1 mg Daily PM    tacrolimus capsule 1 mg Daily AM     Family History     Problem Relation (Age of Onset)    Cancer Father    Diabetes Father    Heart failure Father, Mother    Stroke Father        Tobacco Use    Smoking status: Former Smoker     Packs/day: 1.00     Years: 40.00     Pack years: 40.00     Quit date: 2013     Years since quittin.5    Smokeless tobacco: Never Used   Substance and Sexual Activity    Alcohol use: Yes     Alcohol/week: 6.0 standard drinks     Types: 6 Cans of beer per week     Comment: seldom    Drug use: No    Sexual activity: Never     Review of Systems   Constitutional: Negative.    HENT: Negative.    Eyes: Negative.    Respiratory: Negative.    Cardiovascular: Negative.    Gastrointestinal: Negative.    Genitourinary: Negative.    Musculoskeletal:        Discomfort in his left lower extremity has surgical site.   Skin: Negative.    Neurological: Negative.    Hematological: Negative.      Objective:     Vital Signs (Most Recent):  Temp: 97.5 °F (36.4 °C) (22)  Pulse: 66 (22)  Resp: 18 (22)  BP: (!) 171/73 (22)  SpO2: 95 % (22)  O2 Device (Oxygen Therapy): room air (08/10/22 2005) Vital Signs (24h Range):  Temp:  [97.5 °F (36.4 °C)-98.5 °F (36.9 °C)] 97.5 °F (36.4 °C)  Pulse:  [63-70] 66  Resp:  [16-19] 18  SpO2:  [94 %-97 %] 95 %  BP: (142-191)/(63-88) 171/73     Weight: 114.7 kg (252 lb 13.9 oz) (22)  Body mass index is 35.27 kg/m².  Body surface area is 2.4 meters squared.    I/O last 3 completed shifts:  In: 420 [P.O.:420]  Out: 2875 [Urine:2875]    Physical Exam  Constitutional:       Appearance: Normal appearance.   HENT:      Head: Normocephalic and atraumatic.   Eyes:      General: No scleral icterus.     Extraocular Movements:  Extraocular movements intact.      Pupils: Pupils are equal, round, and reactive to light.   Pulmonary:      Effort: Pulmonary effort is normal.      Breath sounds: No stridor.   Musculoskeletal:      Comments: External fixation device left lower extremity.   Skin:     General: Skin is warm.   Neurological:      General: No focal deficit present.      Mental Status: He is alert and oriented to person, place, and time.   Psychiatric:         Mood and Affect: Mood normal.         Behavior: Behavior normal.         Significant Labs:  BMP:   Recent Labs   Lab 08/07/22  0515 08/08/22  0501 08/09/22  0633   *   < > 144*   *   < > 134*   K 4.2   < > 4.3      < > 103   CO2 23   < > 24   BUN 16   < > 14   CREATININE 1.2   < > 1.1   CALCIUM 7.9*   < > 8.1*   MG 2.0  --   --     < > = values in this interval not displayed.     CMP:   Recent Labs   Lab 08/06/22  1010 08/07/22  0515 08/09/22  0633   *   < > 144*   CALCIUM 7.8*   < > 8.1*   ALBUMIN 2.3*   < > 2.3*   PROT 5.3*  --   --    *   < > 134*   K 4.2   < > 4.3   CO2 23   < > 24      < > 103   BUN 15   < > 14   CREATININE 1.1   < > 1.1   ALKPHOS 120  --   --    ALT 19  --   --    AST 35  --   --    BILITOT 0.7  --   --     < > = values in this interval not displayed.     All labs within the past 24 hours have been reviewed.    Significant Imaging:  Labs: Reviewed  Reviewed    Assessment/Plan:     Active Diagnoses:    Diagnosis Date Noted POA    PRINCIPAL PROBLEM:  Closed fracture of left tibia and fibula [S82.202A, S82.402A] 08/04/2022 Yes    Pressure injury of left hip, stage 2 [L89.222] 08/05/2022 Yes    Severe central sleep apnea comorbid with prescribed opioid use [F11.90, G47.37] 01/29/2020 Yes    Kidney transplant recipient [Z94.0] 04/07/2017 Not Applicable    Type 2 diabetes mellitus with stable proliferative retinopathy of both eyes, with long-term current use of insulin [E11.3553, Z79.4] 12/01/2016 Not Applicable     Coronary artery disease of native artery of native heart with stable angina pectoris [I25.118] 07/01/2016 Yes    Osteoarthritis of lumbar spine [M47.816]  Yes    Essential hypertension [I10] 02/20/2015 Yes      Problems Resolved During this Admission:       1. Kidney transplant status:  Status post kidney transplant in May of 2016.  Cause of renal failure was diabetic nephropathy.  Creatinine has remained stable.  Remains nonoliguric with 1650 cc urine output reported.    2. Immunosuppression:  Prograf level yesterday was 6.5.  Prograf adjusted yesterday to 1 mg twice a day.  Will check a level tomorrow morning.    Given his transplant vintage a level between 3 and 6 is appropriate.    Will plan to restart mycophenolate.  He is on low-dose of 250 mg b.i.d. secondary to history of skin cancers.    Magnesium was stable 2.0.        Thank you for your consult.     Jose David Hooker MD  Nephrology  [facility]

## 2022-08-11 NOTE — PT/OT/SLP PROGRESS
"Physical Therapy  Treatment    Lavelle Ladd   MRN: [id number]   Admitting Diagnosis: Closed fracture of left tibia and fibula    PT Received On: 08/11/22  PT Start Time: 1410     PT Stop Time: 1420    PT Total Time (min): 10 min       Billable Minutes:  Therapeutic Activity 10    Treatment Type: Treatment  PT/PTA: PTA     PTA Visit Number: 4       General Precautions: Standard, fall  Orthopedic Precautions: LLE non weight bearing   Braces: N/A  Respiratory Status: Room air         Subjective:  Communicated with patient's nurse, Gulshan, and completed Epic chart review prior to session.  Patient c/o confusion and asked multiple times how he got here, where he was, and what happened to him.        Objective:   Patient found with: bed alarm, external fixator, telemetry, peripheral IV    Therapeutic Activities:  Attempted multiple times, at length, to re orient patient but almost as soon as explanation to each question was given patient would repeat the same questions. Attempted to calm and re assure patient as he was becoming increasingly anxious.   Patient appeared disoriented and presented as AOx1  When patient was told today's date and his admit date, he responded with "I've been here a week?!" (patient was not told the length of time only the dates)  Nurse and charge nurse notified for further assessment.       AM-PAC 6 CLICK MOBILITY  How much help from another person does this patient currently need?   1 = Unable, Total/Dependent Assistance  2 = A lot, Maximum/Moderate Assistance  3 = A little, Minimum/Contact Guard/Supervision  4 = None, Modified [place]/Independent         AM-PAC Raw Score CMS G-Code Modifier Level of Impairment Assistance   6 % Total / Unable   7 - 9 CM 80 - 100% Maximal Assist   10 - 14 CL 60 - 80% Moderate Assist   15 - 19 CK 40 - 60% Moderate Assist   20 - 22 CJ 20 - 40% Minimal Assist   23 CI 1-20% SBA / CGA   24 CH 0% Independent/ Mod I     Patient left HOB elevated with call " button in reach, bed alarm on and nurse & charge nurse notified.    Assessment:  Lavelle Ladd is a 67 y.o. male with a medical diagnosis of Closed fracture of left tibia and fibula and presents with overall decline in functional mobility. Patient would continue to benefit from skilled PT to address functional limitations listed below in order to return to PLOF/decrease caregiver burden.    Rehab identified problem list/impairments: Rehab identified problem list/impairments: weakness, impaired endurance, impaired sensation, impaired self care skills, impaired functional mobility, decreased coordination, decreased upper extremity function, decreased lower extremity function, decreased safety awareness, pain, decreased ROM, impaired skin, edema, impaired cardiopulmonary response to activity, orthopedic precautions    Rehab potential is fair.    Activity tolerance: unable to determine    Discharge recommendations: Discharge Facility/Level of Care Needs: nursing facility, skilled     Barriers to discharge:      Equipment recommendations: Equipment Needed After Discharge: other (see comments) (TBD BY NEXT LEVEL OF CARE)     GOALS:   Multidisciplinary Problems     Physical Therapy Goals        Problem: Physical Therapy    Goal Priority Disciplines Outcome Goal Variances Interventions   Physical Therapy Goal     PT, PT/OT      Description: LT22  1. PT WILL COMPLETE BED MOBILITY IND  2. PT WILL SCOOT FOR T/F TRAINING NWB  B LE WITH SBA IN BED  3. PT WILL COMPLETE SLIDE BOARD T/F TO WC WITH MAX A>MODA                   PLAN:    Patient to be seen 3 x/week  to address the above listed problems via therapeutic activities, therapeutic exercises, neuromuscular re-education  Plan of Care expires: 22  Plan of Care reviewed with: patient         2022

## 2022-08-11 NOTE — PROGRESS NOTES
Lavelle Ladd is a 67 y.o. male patient.     Subjective   The patient is 7 days postop closed reduction external fixation of left tibial and fibular fractures.  He feels like the swelling in his left leg is about typical for him now.  Pain is decreased.  Awaits rehab transfer.    1. Closed fracture of left tibia and fibula, initial encounter    2. Fall    3. Fracture tibia/fibula, left, closed, initial encounter    4. Preop examination    5. Coronary artery disease    6. Closed fracture of left tibia and fibula with routine healing, subsequent encounter    7. Pressure injury of left hip, stage 2    8. Closed fracture of left tibia and fibula      Past Medical History:   Diagnosis Date    DINORAH (acute kidney injury) 2016    Arthritis     CAD in native artery 2019    CHF (congestive heart failure)     Chronic obstructive pulmonary disease 2016    Coronary artery disease involving native coronary artery of native heart without angina pectoris 2016    CRI (chronic renal insufficiency) 2019     donor kidney transplant for DM 16     Induction with Thymo x3 and IV solumedrol to total 875mg  Kidney Biopsy  2016: 16 glomeruli, ACR type 1 AVR type 2, significant microcirculatory changes, c4d negative, No DSA, 5 to10% fibrosis. Treated with thymo x8 2016- no rejection      Diastolic heart failure     Encounter for blood transfusion     ESRD on RRT since 10/2013 10/29/2013    Biopsy proven diabetic nephropathy and lymphoplasmacytic interstitial infiltrate not c/w with AIN (ddx sjogrens or assoc with tamm-horsefall protein extravasation)     GERD (gastroesophageal reflux disease)     History of hepatitis C, s/p successful Rx w/ SVR12 - 2017    Completed 12 weeks harvoni w/ SVR    Hyperlipidemia     Hypertension     PAD (peripheral artery disease) 2019    PIC line (peripherally inserted central catheter) flush     Prophylactic immunotherapy      Proteinuria     PVD (peripheral vascular disease) 6/26/2017    RLE BKA CT 12/11/16 Extensive atherosclerotic disease of the aorta and mesenteric arteries.     Renal hypertension     Type 2 diabetes mellitus with diabetic neuropathy, with long-term current use of insulin 12/1/2016    Vitamin B12 deficiency      Past Surgical History Pertinent Negatives:   Procedure Date Noted    CARDIAC SURGERY 12/08/2015     Scheduled Meds:   amLODIPine  10 mg Oral Daily    apixaban  2.5 mg Oral BID    atorvastatin  80 mg Oral Daily    collagenase   Topical (Top) Daily    fluticasone propionate  2 spray Each Nostril Daily    methocarbamoL  500 mg Oral QID    metoprolol tartrate  25 mg Oral BID    mupirocin   Topical (Top) Daily    sertraline  25 mg Oral Daily    sodium hypochlorite 0.125%   Topical (Top) BID    tacrolimus  1 mg Oral Daily PM    tacrolimus  1 mg Oral Daily AM     Continuous Infusions:  PRN Meds:acetaminophen, albuterol-ipratropium, aluminum-magnesium hydroxide-simethicone, benzonatate, dextrose 10%, dextrose 10%, glucagon (human recombinant), guaiFENesin 100 mg/5 ml, hydrALAZINE, insulin aspart U-100, melatonin, morphine, ondansetron, oxyCODONE-acetaminophen, sodium chloride 0.9%, sodium chloride 0.9%, sodium chloride 0.9%    Review of patient's allergies indicates:  No Known Allergies  Active Hospital Problems    Diagnosis  POA    *Closed fracture of left tibia and fibula [S82.202A, S82.402A]  Yes    Pressure injury of left hip, stage 2 [L89.222]  Yes    Severe central sleep apnea comorbid with prescribed opioid use [F11.90, G47.37]  Yes    Kidney transplant recipient [Z94.0]  Not Applicable    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     Blood pressure (!) 171/73, pulse 66, temperature 97.5 °F (36.4 °C), temperature source Oral, resp. rate 18, height 5' 11" (1.803 m), weight 114.7 kg (252 lb 13.9 oz), SpO2 95 %.    Objective   Well-developed obese male in no acute distress.  Awake, alert, oriented, cooperative with evaluation.    Examination of left lower leg reveals that the external fixation device is intact.  Pin sites are clean and dry without evidence of infection.  The wound along the medial mid leg is healing without infection.  The skin lesions and irritations around the transmetatarsal amputation stump for improved.  There is mild swelling of the lower leg.    Fasting glucose 181     Assessment & Plan   The patient's pain level is much improved.  Swelling is decreasing.  Discussed the nature of the injury, prognosis and treatment plan.  Anticipate leaving the external fixator in place for about 6 weeks and then consider below-knee casting until fracture healing.  Continue with nonweightbearing status.  Await rehab placement.    The patient needs [facility] for [facility]-rays and 2 weeks.  Needs continued wound care management.       8/11/2022        Good Villa MD, FAAOS [facility], Orthopedic Trauma Service  [place]    "

## 2022-08-12 NOTE — PLAN OF CARE
[facility] - Med [facility]urg  Discharge Final Note    Primary Care Provider: Yahir Calzada MD    Expected Discharge Date: 8/12/2022    Final Discharge Note (most recent)     Final Note - 08/12/22 1541        Final Note    Assessment Type Final Discharge Note     Anticipated Discharge Disposition [facility]illed [facility]     Hospital Resources/Appts/Education Provided Post-Acute resouces added to AVS        Post-Acute Status    Post-Acute Placement Status Set-up Complete/Auth obtained     Discharge Delays None known at this time                 Important Message from Medicare             Contact Info     Ha Juarez MD   Specialty: General Surgery    [address]  [place] [address]   Phone: [phone]       Next Steps: Follow up in 1 month(s)    Instructions: post op appt, debridement of decubitus    [facility]   Specialty: Skilled Nursing Facility    [address] [facility] [address]   Phone: [phone]       Next Steps: Follow up        DC disposition: [facility]   Transportation: facility transport   Nurse provided with phone number for report: [phone]  All DC info uploaded into [employer].     MAYKEL Rocha

## 2022-08-12 NOTE — NURSING
Attempted to call report, receiving nurse is on lunch left call back number for when nurse is available.

## 2022-08-12 NOTE — PROGRESS NOTES
[facility]  Telemedicine Progress Note    Patient Name: Lavelle Ladd  MRN: [id number]  Patient Class: IP- Inpatient   Admission Date: 8/4/2022  Length of Stay: 7 days  Attending Physician: Krishna Zamora MD  Primary Care Provider: Yahir Cazlada MD          Subjective:     Principal Problem:Closed fracture of left tibia and fibula        HPI:  Patient is a 67 y.o.  male with a PMHx of HTN, CAD, DM, PVD, kidney transplant, COPD, right BKA, left transmetatarsal amputation who presents to the Emergency Department for evaluation of a fall which onset suddenly 5 hours ago. Patient states he was trying to get into his wheel chair when he slipped and fell. He reported not being able to put weight on it and laid on the ground for 4-5 hours before calling ems. Patient denied any issues with anesthesia with prior surgeries. Currently complains of pain and nausea. Otherwise denies any other issues.     In the ED, ankle xray showed distal tib fib fracture. Ortho notified and plans to operate later this afternoon. Patient was admitted to .               Overview/Hospital Course:  Pt postop day 1 status post closed reduction left tibial and fibular shaft fractures with application of external fixation device. PT/OT rec SNF placement. General  surgery consulted for eval of L lateral buttock wound, hold apixaban for now.     8/6 POD#2 patient lying in bed resting, c/o lower extremity and back pain. Pt s/p bedside debridement of L lateral buttock wound per general surgery. Resume apixaban. Nephrology consulted for subtherapeutic prograf level. Continue supportive management, awaiting SNF placement.  8/7 POD #3 No acute events overnight. Per surgery patient NWB to LLE. Plan to leave the external fixation device on for approximately 6 weeks and then below-knee casting until fracture heals. Will need f/u  in ortho trauma clinic within 3 weeks. Continue supportive management. Plan SNF  placement- patient selected [facility] for placement.  Anticipate discharge within 24-48 hours.     8/8 awaiting SNF placement. Per nephrology continue prograf 1 mg every evening, 1.5 mg every morning pending repeat level.   8/9 No acute events overnight. Pt working with PT/OT, awaiting SNF.   8/10 [facility] accepted, awaiting ins authorization, continue supportive management. Consult virtual        Interval History: patient is in slight distress today due to uncontrolled pain in his leg; narcotic adjustments done and added non-narcotics scheduled as well; BP not controlled, adding coreg; has been accepted to snf and plan for dc tomorrow     Review of Systems   Constitutional:  Negative for activity change, fatigue and fever.   Respiratory:  Positive for cough. Negative for shortness of breath.    Gastrointestinal:  Negative for abdominal pain and nausea.   Musculoskeletal:  Positive for arthralgias and myalgias.   Objective:     Vital Signs (Most Recent):  Temp: 97.5 °F (36.4 °C) (08/11/22 1935)  Pulse: 69 (08/11/22 1935)  Resp: 20 (08/11/22 2025)  BP: (!) 124/58 (08/11/22 1935)  SpO2: (!) 93 % (08/11/22 1935)   Vital Signs (24h Range):  Temp:  [97.5 °F (36.4 °C)-98.5 °F (36.9 °C)] 97.5 °F (36.4 °C)  Pulse:  [65-71] 69  Resp:  [17-20] 20  SpO2:  [93 %-97 %] 93 %  BP: (122-191)/(58-88) 124/58     Weight: 114.7 kg (252 lb 13.9 oz)  Body mass index is 35.27 kg/m².    Intake/Output Summary (Last 24 hours) at 8/11/2022 2346  Last data filed at 8/11/2022 1200  Gross per 24 hour   Intake --   Output 1250 ml   Net -1250 ml      Physical Exam  Constitutional:       General: He is in acute distress.      Appearance: He is ill-appearing.   Pulmonary:      Effort: Pulmonary effort is normal.      Breath sounds: No wheezing.   Abdominal:      General: Abdomen is flat. There is no distension.   Musculoskeletal:         General: Swelling and tenderness present.      Comments: Decrease ROM in LLE    Skin:      Coloration: Skin is not jaundiced or pale.   Neurological:      General: No focal deficit present.      Mental Status: He is alert and oriented to person, place, and time.   Psychiatric:         Mood and Affect: Mood normal.         Behavior: Behavior normal.       Significant Labs: All pertinent labs within the past 24 hours have been reviewed.    Significant Imaging: I have reviewed all pertinent imaging results/findings within the past 24 hours.      Assessment/Plan:      * Closed fracture of left tibia and fibula  Patient with distal tib fib fracture   Ortho consulted on case and plans to operate later today  Cont npo  Morphine PRN  Patient with numerous comorbid conditions including  Cad, pvd, copd, diabetes  Poor functional capacity at home with METS < 4  Chest xray unremarkable  Ekg showed first degree AV block  Will obtain stat echo to evaluate LV function  Pt at least moderate risk for cardiac complications    Advance Care Planning     Patient is a full code and sdm is his sister.     8/5/22 POD#1   NWB LLE per ortho recs Plan to leave the external fixation device on for approximately 6 weeks and then below-knee casting until fracture heals.  Continue pain management  PT/OT rec SNF     8/11- accepted for snf tomorrow  ; making adjustments on pain meds today     Essential hypertension  uncontrolled    Resume home metoprolol and amlodipine   Hydralazine PRN    8/11- switched lopressor to coreg      Pressure injury of left hip, stage 2  S/p bedside debridement per general surgery       Severe central sleep apnea comorbid with prescribed opioid use  cpap qhs       Kidney transplant recipient  Resume tacrolimus  Hold cellcept     8/6   Prograf level subtherapeutic, consult nephrology     Type 2 diabetes mellitus with stable proliferative retinopathy of both eyes, with long-term current use of insulin  Patient's FSGs are uncontrolled due to hyperglycemia on current medication regimen.  Last A1c reviewed-   Lab  Results   Component Value Date    HGBA1C 7.4 (H) 08/04/2022     Most recent fingerstick glucose reviewed-   Recent Labs   Lab 08/09/22  2022 08/10/22  0514 08/10/22  1156 08/10/22  1705   POCTGLUCOSE 195* 184* 191* 209*     Current correctional scale  High  Maintain anti-hyperglycemic dose as follows-   Antihyperglycemics (From admission, onward)            Start     Stop Route Frequency Ordered    08/04/22 0845  insulin aspart U-100 pen 1-10 Units         -- SubQ Every 6 hours PRN 08/04/22 0745        Hold Oral hypoglycemics while patient is in the hospital.    Coronary artery disease of native artery of native heart with stable angina pectoris  Resume statin   Hold asa and plavix         Osteoarthritis of lumbar spine  Chronic pain  Seeing pain management outpatient         VTE Risk Mitigation (From admission, onward)         Ordered     apixaban tablet 2.5 mg  2 times daily         08/08/22 1416     IP VTE HIGH RISK PATIENT  Once         08/04/22 1601     Place sequential compression device  Until discontinued         08/04/22 0608                      I have assessed these finding virtually using telemed platform and with assistance of bedside nurse                 The attending portion of this evaluation, treatment, and documentation was performed per Krishna Zamora MD via Telemedicine AudioVisual using the secure LensAR software platform with 2 way audio/video. The provider was located off-site and the patient is located in the hospital. The aforementioned video software was utilized to document the relevant history and physical exam    Krishna Zamora MD  Department of Hospital Medicine   [facility]

## 2022-08-12 NOTE — ASSESSMENT & PLAN NOTE
Patient with distal tib fib fracture   Ortho consulted on case and plans to operate later today  Cont npo  Morphine PRN  Patient with numerous comorbid conditions including  Cad, pvd, copd, diabetes  Poor functional capacity at home with METS < 4  Chest xray unremarkable  Ekg showed first degree AV block  Will obtain stat echo to evaluate LV function  Pt at least moderate risk for cardiac complications    Advance Care Planning     Patient is a full code and sdm is his sister.      8/5/22 POD#1   NWB LLE per ortho recs Plan to leave the external fixation device on for approximately 6 weeks and then below-knee casting until fracture heals.  Continue pain management  PT/OT rec SNF     8/11- accepted for snf tomorrow  ; making adjustments on pain meds today

## 2022-08-12 NOTE — SUBJECTIVE & OBJECTIVE
Interval History: patient is in slight distress today due to uncontrolled pain in his leg; narcotic adjustments done and added non-narcotics scheduled as well; BP not controlled, adding coreg; has been accepted to snf and plan for dc tomorrow     Review of Systems   Constitutional:  Negative for activity change, fatigue and fever.   Respiratory:  Positive for cough. Negative for shortness of breath.    Gastrointestinal:  Negative for abdominal pain and nausea.   Musculoskeletal:  Positive for arthralgias and myalgias.   Objective:     Vital Signs (Most Recent):  Temp: 97.5 °F (36.4 °C) (08/11/22 1935)  Pulse: 69 (08/11/22 1935)  Resp: 20 (08/11/22 2025)  BP: (!) 124/58 (08/11/22 1935)  SpO2: (!) 93 % (08/11/22 1935)   Vital Signs (24h Range):  Temp:  [97.5 °F (36.4 °C)-98.5 °F (36.9 °C)] 97.5 °F (36.4 °C)  Pulse:  [65-71] 69  Resp:  [17-20] 20  SpO2:  [93 %-97 %] 93 %  BP: (122-191)/(58-88) 124/58     Weight: 114.7 kg (252 lb 13.9 oz)  Body mass index is 35.27 kg/m².    Intake/Output Summary (Last 24 hours) at 8/11/2022 2346  Last data filed at 8/11/2022 1200  Gross per 24 hour   Intake --   Output 1250 ml   Net -1250 ml      Physical Exam  Constitutional:       General: He is in acute distress.      Appearance: He is ill-appearing.   Pulmonary:      Effort: Pulmonary effort is normal.      Breath sounds: No wheezing.   Abdominal:      General: Abdomen is flat. There is no distension.   Musculoskeletal:         General: Swelling and tenderness present.      Comments: Decrease ROM in LLE    Skin:     Coloration: Skin is not jaundiced or pale.   Neurological:      General: No focal deficit present.      Mental Status: He is alert and oriented to person, place, and time.   Psychiatric:         Mood and Affect: Mood normal.         Behavior: Behavior normal.       Significant Labs: All pertinent labs within the past 24 hours have been reviewed.    Significant Imaging: I have reviewed all pertinent imaging  results/findings within the past 24 hours.

## 2022-08-12 NOTE — PT/OT/SLP PROGRESS
"Occupational Therapy   Treatment    Name: Lavelle Ladd  MRN: [id number]  Admitting Diagnosis:  Closed fracture of left tibia and fibula  8 Days Post-Op    Recommendations:     Discharge Recommendations: nursing facility, skilled  Discharge Equipment Recommendations:  other (see comments) (TBD by next level of care)  Barriers to discharge:  Decreased caregiver support    Assessment:     Lavelle Ladd is a 67 y.o. male with a medical diagnosis of Closed fracture of left tibia and fibula.  He presents with the following performance deficits affecting function are weakness, impaired endurance, impaired sensation, impaired self care skills, impaired functional mobility, gait instability, impaired balance, decreased coordination, decreased upper extremity function, decreased lower extremity function, decreased safety awareness, pain, decreased ROM, impaired cardiopulmonary response to activity, orthopedic precautions.     Rehab Prognosis:  Fair; patient would benefit from acute skilled OT services to address these deficits and reach maximum level of function.       Plan:     Patient to be seen 2 x/week to address the above listed problems via self-care/home management, therapeutic activities, therapeutic exercises  · Plan of Care Expires: 08/19/22  · Plan of Care Reviewed with: patient    Subjective     Pain/Comfort:  · Pain Rating 1: 5/10  · Location - Side 1: Left  · Location - Orientation 1: lower  · Location 1: leg  · Pain Addressed 1: Reposition    Objective:     Communicated with: nurse Gaffney and epic chart review prior to session.  Patient found supine with peripheral IV, telemetry, external fixator upon OT entry to room.    General Precautions: Standard, fall   Orthopedic Precautions:LLE non weight bearing   Braces: N/A  Respiratory Status: Room air       AMPAC 6 Click ADL: 16    Treatment & Education:  Pt refusing EOB/OOB activity today, stating "I am just getting comfortable", despite max encouragement. Pt " adamant about performing grooming ADL's later despite encouragement. Pt educated on and performed x10 reps BUE AROM therapeutic exercises (shoulder flexion, elbow flexion/ext, scapular retractions, and digit flexion/ext) while supine in bed. Pt reporting stiffness in R elbow. Pt oriented today. Educated on importance of EOB/OOB activity and calling for A to meet needs. Encouraged completion of B UE AROM therex throughout the day to tolerance to increase functional strength and activity tolerance. Patient stated understanding and in agreement with POC.    Patient left right sidelying with all lines intact, call button in reach and nurse notifiedEducation:      GOALS:   Multidisciplinary Problems     Occupational Therapy Goals        Problem: Occupational Therapy    Goal Priority Disciplines Outcome Interventions   Occupational Therapy Goal     OT, PT/OT Ongoing, Progressing    Description: Goals to be met by: 8/19/2022     Patient will increase functional independence with ADLs by performing:    LE Dressing with Supervision.  Toilet transfer to bedside commode with Moderate Assistance using AD as needed.  Upper extremity exercise program x15 reps per handout, with independence.                     Time Tracking:     OT Date of Treatment: 08/12/22  OT Start Time: 0935  OT Stop Time: 0945  OT Total Time (min): 10 min    Billable Minutes:Therapeutic Exercise 10    OT/MIGUEL: OT       Yuli Solomon, OT    8/12/2022

## 2022-08-12 NOTE — PLAN OF CARE
NURSING HOME ORDERS    08/12/2022  [facility]  [facility]  [address] [facility] [address]  Dept: [phone]  Loc: [phone]     Admit to Nursing Home:  Skilled Nursing Facility    Diagnoses:  Active Hospital Problems    Diagnosis  POA    *Closed fracture of left tibia and fibula [S82.202A, S82.402A]  Yes     Priority: 1 - High    Essential hypertension [I10]  Yes     Priority: 2     Pressure injury of left hip, stage 2 [L89.222]  Yes    Severe central sleep apnea comorbid with prescribed opioid use [F11.90, G47.37]  Yes    Kidney transplant recipient [Z94.0]  Not Applicable    Type 2 diabetes mellitus with stable proliferative retinopathy of both eyes, with long-term current use of insulin [E11.3553, Z79.4]  Not Applicable    Coronary artery disease of native artery of native heart with stable angina pectoris [I25.118]  Yes    Osteoarthritis of lumbar spine [M47.816]  Yes      Resolved Hospital Problems   No resolved problems to display.       Patient is homebound due to:  Closed fracture of left tibia and fibula    Allergies:Review of patient's allergies indicates:  No Known Allergies    Vitals:  Every shift    Diet: diabetic diet: 2000 calorie    Activities:  LLE non weight bearing     Goals of Care Treatment Preferences:  Code Status: Full Code    Health care agent: Kecia Miller, ([phone])  Health care agent number: No value filed.                   Labs:  Weekly cbc, cmp and prograf levels     Nursing Precautions:  Fall    Consults:   PT to evaluate and treat- 5 times a week, OT to evaluate and treat- 5 times a week and Wound Care     Wound Care:    LLE pin sites:  1. Cleanse with hydrogen peroxide  2. Pat dry  3. Apply bactroban per MAR  4. Pad with dry gauze and/or ABD and secure with tape  5. Perform daily              Left buttock unstageable pressure injury:  1. Cleanse with saline   2. Pat dry   3. Paint periwound with cavilon  4.  Apply dakins moistened gauze to wound bed  5. Pad with dry gauze and secure with tape  6. Change every shift and prn excess drainage             Diabetes Care:  SN to perform and educate Diabetic management with blood glucose monitoring:, Fingerstick blood sugar AC and HS and Report CBG < 60 or > 350 to physician.       Insulin aspart U-100 pen 4 units with meals    Insulin aspart U-100 pen 1-10 units    MODERATE CORRECTION DOSE  Blood Glucose  mg/dL                  0600                0000                               1200                               1800  151-200                2 units             1 unit  201-250                4 units             2 units  251-300                6 units             3 units  301-350                8 units             4 units  >350                      10 units           5 units  Administer subcutaneously if needed at times designated by monitoring schedule.  DO NOT HOLD correction dose insulin for patients who are  NPO.        Medications: Discontinue all previous medication orders, if any. See new list below.     Medication List      START taking these medications    acetaminophen 500 MG tablet  Commonly known as: TYLENOL  Take 2 tablets (1,000 mg total) by mouth 3 (three) times daily.     apixaban 2.5 mg Tab  Commonly known as: ELIQUIS  Take 1 tablet (2.5 mg total) by mouth 2 (two) times daily. for 24 days     carvediloL 12.5 MG tablet  Commonly known as: COREG  Take 1 tablet (12.5 mg total) by mouth 2 (two) times daily.     collagenase ointment  Commonly known as: SANTYL  Apply topically once daily. To left buttock ulcer  Start taking on: August 13, 2022     methocarbamoL 500 MG Tab  Commonly known as: ROBAXIN  Take 1 tablet (500 mg total) by mouth 4 (four) times daily. for 10 days     oxyCODONE 15 MG Tab  Commonly known as: ROXICODONE  Take 1 tablet (15 mg total) by mouth every 4 (four) hours as needed for Pain.     sodium hypochlorite 0.125% external solution  Commonly  known as: DAKIN'S SOLUTION  Apply topically 2 (two) times daily. Left buttock ulcer        CHANGE how you take these medications    OZEMPIC 1 mg/dose (2 mg/1.5 mL) Pnij  Generic drug: semaglutide  Inject 1 mg under the skin every 7 days.  What changed: additional instructions        CONTINUE taking these medications    amLODIPine 10 MG tablet  Commonly known as: NORVASC  Take 1 tablet (10 mg total) by mouth once daily.     aspirin 81 MG EC tablet  Commonly known as: ECOTRIN  Take 1 tablet (81 mg total) by mouth once daily.     atorvastatin 80 MG tablet  Commonly known as: LIPITOR  Take 1 tablet (80 mg total) by mouth once daily.     clopidogreL 75 mg tablet  Commonly known as: PLAVIX  Take 1 tablet (75 mg total) by mouth once daily.     ergocalciferol 50,000 unit Cap  Commonly known as: ERGOCALCIFEROL  Take 1 capsule (50,000 Units total) by mouth every 7 days. Take on Mondays     flash glucose scanning reader Misc  Commonly known as: FREESTYLE BRYAN 14 DAY READER  1 Device by Misc.(Non-Drug; Combo Route) route as needed.     FREESTYLE BRYAN 14 DAY SENSOR Kit  Generic drug: flash glucose sensor  1 kit by Misc.(Non-Drug; Combo Route) route every 14 (fourteen) days.     gabapentin 300 MG capsule  Commonly known as: NEURONTIN  Take 300 mg by mouth 3 (three) times daily.     hydrocortisone 2.5 % cream  Apply topically 2 (two) times daily.     insulin aspart U-100 100 unit/mL (3 mL) Inpn pen  Commonly known as: NovoLOG  Inject 5 units three times a day with meal if BG > 300.     LIDOcaine 5 %  Commonly known as: LIDODERM  Place 1 patch onto the skin daily as needed. 12 hours on and 12 hours off     linaCLOtide 72 mcg Cap capsule  Commonly known as: LINZESS  Take 1 capsule (72 mcg total) by mouth before breakfast.     mycophenolate 250 mg Cap  Commonly known as: CELLCEPT  Take 1 capsule (250 mg total) by mouth 2 (two) times daily.     nitroGLYCERIN 0.4 MG SL tablet  Commonly known as: NITROSTAT  Place 1 tablet (0.4 mg  total) under the tongue every 5 (five) minutes as needed.     ondansetron 4 MG Tbdl  Commonly known as: ZOFRAN-ODT  Take 1 tablet (4 mg total) by mouth every 8 (eight) hours as needed.     sertraline 25 MG tablet  Commonly known as: ZOLOFT  Take 1 tablet (25 mg total) by mouth once daily. For depression and anxiety     tacrolimus 0.5 MG Cap  Commonly known as: PROGRAF  Take 2 capsules (1 mg total) by mouth every 12 (twelve) hours.        STOP taking these medications    cloNIDine 0.1 MG tablet  Commonly known as: CATAPRES     furosemide 40 MG tablet  Commonly known as: LASIX     HYDROcodone-acetaminophen  mg per tablet  Commonly known as: NORCO     ipratropium 0.02 % nebulizer solution  Commonly known as: ATROVENT     isosorbide mononitrate 30 MG 24 hr tablet  Commonly known as: IMDUR     ketoconazole 2 % cream  Commonly known as: NIZORAL     levalbuterol 1.25 mg/3 mL nebulizer solution  Commonly known as: XOPENEX     metoprolol tartrate 25 MG tablet  Commonly known as: LOPRESSOR     naloxone 4 mg/actuation Spry  Commonly known as: NARCAN     sevelamer carbonate 800 mg Tab  Commonly known as: RENVELA     VIOS AEROSOL DELIVERY SYSTEM [place]  Generic drug: nebulizer and compressor              Immunizations Administered as of 8/12/2022  Reviewed on 8/11/2022    No immunizations on file.                 _________________________________  Krishna Zamora MD  08/12/2022

## 2022-08-12 NOTE — PLAN OF CARE
Authorization received for [facility]; pt can d/c to facility once medically stable.          08/12/22 0820   Post-Acute Status   Post-Acute Authorization Placement   Post-Acute Placement Status Set-up Complete/Auth obtained   Discharge Plan   Discharge Plan A Skilled Nursing Facility   Discharge Plan B Skilled Nursing Facility

## 2022-08-12 NOTE — PROGRESS NOTES
F/U visit with Mr. Ladd for ongoing wound reassessment. He is resting with eyes closed, turned to right side. Arouses easily to voice. Sacrum and coccyx intact with no redness or breakdown noted. Left posterior hip/buttock wound reassessed. Was unstageable on admit, now stage 3 pressure injury.   Wound measures 6x3x0.3cm. wound bed small amount moist red tissue to edges, adherent yellow slough to wound bed. Cleansed with saline and patted dry. Dakins moistened gauze applied to cover and secured with medipore tape. Recommend ongoing care per orders. Will continue to follow.      08/12/22 1130   WOCN Assessment   WOCN Total Time (mins) 20   Visit Date 08/12/22   Visit Time 1130   Consult Type Follow Up   WOCN Speciality Wound        Altered Skin Integrity 08/04/22 1716 Left posterior Greater trochanter Full thickness tissue loss. Subcutaneous fat may be visible but bone, tendon or muscle are not exposed   Date First Assessed/Time First Assessed: 08/04/22 1716   Altered Skin Integrity Present on Admission: yes  Side: Left  Orientation: posterior  Location: Greater trochanter  Is this injury device related?: No  Description of Altered Skin Integrity: Ful...   Wound Image    Description of Altered Skin Integrity Full thickness tissue loss. Subcutaneous fat may be visible but bone, tendon or muscle are not exposed   Dressing Appearance Intact;Moist drainage   Drainage Amount Small   Drainage Characteristics/Odor Serous   Appearance Red;Yellow;Moist;Adipose;Slough   Tissue loss description Full thickness   Periwound Area Redness   Wound Edges Open   Wound Length (cm) 6 cm   Wound Width (cm) 3 cm   Wound Depth (cm) 0.3 cm   Wound Volume (cm^3) 5.4 cm^3   Wound Surface Area (cm^2) 18 cm^2   Care Cleansed with:;Sterile normal saline;Applied:;Skin Barrier   Dressing Gauze, wet to moist  (dakins solution)

## 2022-08-12 NOTE — ASSESSMENT & PLAN NOTE
uncontrolled    Resume home metoprolol and amlodipine   Hydralazine PRN    8/11- switched lopressor to coreg

## 2022-08-16 NOTE — PHYSICIAN QUERY
PT Name: Lavelle Ladd  MR #: [id number]     DOCUMENTATION CLARIFICATION     CDS/: MARLYN Fisher, RN               Contact information:[email or web address]  This form is a permanent document in the medical record.     Query Date: August 16, 2022    By submitting this query, we are merely seeking further clarification of documentation.  Please utilize your independent clinical judgment when addressing the question(s) below.    The Medical Record contains the following:   Indicators   Supporting Clinical Findings Location in Medical Record    Non-blanchable erythema/redness      x Ulcer/Injury/Skin Breakdown Left upper buttock with unstageable pressure injury. Measures 3x7cm, covered with moist tan and black slough. Edges with erythema, periwound is indurated. Foam dressing in place.        Reason for consult: Pressure ulcer  Assessment/Recommendations: Likely a stage II decubitus of the left hip.   There is a 3 by 6 cm eschar with surrounding erythema on left hip, see photo     Pressure injury of left hip, stage 2           Left posterior hip/buttock wound reassessed. Was unstageable on admit, now stage 3 pressure injury.  Wound Care Consults Note File Time: 08/05/2022 5:27 PM                                                    General Surgery Consults Note File Time: 08/05/2022 6:24 PM          Orthopedic Surgery Progress Notes File Time: 08/06/2022 10:02 AM and Hospital Medicine Progress notes 08/06/2022    Wound Care Progress Notes 08/12/2022        Deep Tissue Injury      x Wound care consult Consulted to this 67 year old male patient for wound care.      Wound measures 6x3x0.3cm. wound bed small amount moist red tissue to edges, adherent yellow slough to wound bed. Cleansed with saline and patted dry. Dakins moistened gauze applied to cover and secured with medipore tape.     Altered Skin Integrity 08/04/22 1716 Left posterior Greater trochanter Full thickness tissue loss. Subcutaneous fat may be visible  but bone, tendon or muscle are not exposed   Wound Care Consults Note File Time: 08/05/2022 5:27 PM    Wound Care Progress Notes 08/12/2022    x Acute/Chronic Illness Closed fracture of left tibia and fibula, Essential hypertension, Pressure injury of left hip, stage 2, Severe central sleep apnea comorbid with prescribed opioid use,   Kidney transplant recipient, Type 2 diabetes mellitus with stable proliferative retinopathy of both eyes, with long-term current use of insulin, Coronary artery disease of native artery of native heart with stable angina pectoris,   Osteoarthritis of lumbar spine   Hospital Medicine Progress Notes 08/11/2022    x Medication/Treatment Dakins moistened gauze ordered, hospital medicine notified of possible need for imaging/surgery consult. Will follow.     Will debrided bedside tomorrow    Pressure injury of left hip, stage 2  Will unroof/debrided the eschar at the bedside tomorrow under local anesthetic.     General surgery is going to debride the eschar in the left lower back area today.    Pt s/p bedside debridement of L lateral buttock wound per general surgery    Santyl applied.  Debridement site is clean.  Enzymatically debride her applied Wound Care Consults Note File Time: 08/05/2022 5:27 PM    General Surgery Consults Note File Time: 08/05/2022 6:24 PM          Orthopedic Surgery Progress Notes File Time: 08/06/2022 10:02 AM    Hospital Medicine Progress Notes 08/06/2022    General Surgery Progress Notes 08/07/2022      x Other He was found at home unlikely on the ground for several days.  Has a left hip pressure ulcer.  Surgery was asked to see him to debride the left hip pressure ulcer General Surgery Consults Note File Time: 08/05/2022 6:24 PM     The clinical guidelines noted are only a system guideline. It does not replace the providers clinical judgment.    Per the National Pressure Injury Advisory Panel:   A pressure injury is localized damage to the skin and underlying  soft tissue usually over a bony prominence or related to a medical or other device. The injury can present as intact skin or an open ulcer and may be painful. The injury occurs as a result of intense and/or prolonged pressure or pressure in combination with shear. The tolerance of soft tissue for pressure and shear may also be affected by microclimate, nutrition, perfusion, co-morbidities and condition of the soft tissue.       Stage 2 Pressure Injury:  Partial-thickness loss of skin with exposed dermis. The wound bed is viable, pink or red, moist, and may also present as an intact or ruptured serum-filled blister.    Stage 3 Pressure Injury:  Full-thickness loss of skin, in which adipose (fat) is visible in the ulcer and granulation tissue and epibole (rolled wound edges) are often present. Slough and/or eschar may be visible. Undermining and tunneling may occur.        Provider, Both Stage 2 and Stage 3 Pressure Injury/Decubitus Ulcer documented. Please clarify the integumentary diagnosis related to the documentation of (Left Hip):     [  x ] Pressure Injury/Decubitus Ulcer, Stage 2   [   ] Pressure Injury/Decubitus Ulcer, Stage 3   [   ] Pressure Injury/Decubitus Ulcer, Stage 2, Progressed to Stage 3 during hospital admission   [   ] Pressure Injury/Decubitus Ulcer, Stage evolved from (please specify): _____ to _____ during hospital admission   [   ] Other Integumentary Diagnosis (please specify):______________   [  ] Clinically Undetermined           Please document in your progress notes daily for the duration of treatment until resolved and include in your discharge summary.    Reference:    SHERIE Connors., Jose, STEPHANIE MENDEZ., Goldberg, M., DAPHNEY Coy., SHERIE Up, & ANDREA Napier. (2016). Revised National Pressure Ulcer Advisory Panel Pressure Injury Staging System: Revised Pressure Injury Staging System. J Wound Ostomy Continence Nurs, 43(6), 585-597. doi:10.1097/won.1096497529458438    Form No.[id number]

## 2022-08-18 NOTE — PROGRESS NOTES
[facility] A1C Report: Per chart review, last A1c done on 8/4/22, hospital visit, result was 7.4. Last office visit was with Dr.Timothy Calzada, no longer with [facility], removed from care team. Attempted to contact patient to schedule appointment with another provider, no answer, no voicemail.

## 2022-08-22 NOTE — PROGRESS NOTES
[facility] Report: Per chart review, patient was a prior pt of Dr.Timothy Calzada. [facility]. Attempted to contact the patient to discuss PCP, no answer, no voicemail.

## 2022-08-24 NOTE — DISCHARGE SUMMARY
[facility] Medicine  Discharge Summary      Patient Name: Lavelle Ladd  MRN: [id number]  Patient Class: IP- Inpatient  Admission Date: 8/4/2022  Hospital Length of Stay: 8 days  Discharge Date and Time: 8/12/22  Attending Physician: No att. providers found   Discharging Provider: Krishna Zamora MD  Primary Care Provider: No primary care provider on file.      HPI:   Patient is a 67 y.o.  male with a PMHx of HTN, CAD, DM, PVD, kidney transplant, COPD, right BKA, left transmetatarsal amputation who presents to the Emergency Department for evaluation of a fall which onset suddenly 5 hours ago. Patient states he was trying to get into his wheel chair when he slipped and fell. He reported not being able to put weight on it and laid on the ground for 4-5 hours before calling ems. Patient denied any issues with anesthesia with prior surgeries. Currently complains of pain and nausea. Otherwise denies any other issues.     In the ED, ankle xray showed distal tib fib fracture. Ortho notified and plans to operate later this afternoon. Patient was admitted to .               Procedure(s) (LRB):  APPLICATION, EXTERNAL FIXATION DEVICE, LARGE, TIBIA (Left)  CLOSED REDUCTION, TIBIA (Left)      Hospital Course:   Pt postop day 1 status post closed reduction left tibial and fibular shaft fractures with application of external fixation device. PT/OT rec SNF placement. General  surgery consulted for eval of L lateral buttock wound, hold apixaban for now.     8/6 POD#2 patient lying in bed resting, c/o lower extremity and back pain. Pt s/p bedside debridement of L lateral buttock wound per general surgery. Resume apixaban. Nephrology consulted for subtherapeutic prograf level. Continue supportive management, awaiting SNF placement.  8/7 POD #3 No acute events overnight. Per surgery patient NWB to LLE. Plan to leave the external fixation device on for approximately 6 weeks and then below-knee casting until  fracture heals. Will need f/u  in ortho trauma clinic within 3 weeks. Continue supportive management. Plan SNF placement- patient selected [facility] for placement.  Anticipate discharge within 24-48 hours.     8/8 awaiting SNF placement. Per nephrology continue prograf 1 mg every evening, 1.5 mg every morning pending repeat level.   8/9 No acute events overnight. Pt working with PT/OT, awaiting SNF.   8/10 [facility] accepted, awaiting ins authorization, continue supportive management. Consult virtual         Goals of Care Treatment Preferences:  Code Status: Full Code    Health care agent: Kecia Miller, ([phone])  Health care agent number: No value filed.                   Consults:   Consults (From admission, onward)        Status Ordering Provider     Inpatient virtual consult to Hospital Medicine  Once        Provider:  Krishna Zamora MD    Completed SOFIA MATIAS     Inpatient consult to Social Work  Once        Provider:  (Not yet assigned)    Completed SOFIA MATIAS     Inpatient consult to Nephrology  Once        Provider:  (Not yet assigned)    Completed SOFIA MATIAS     Inpatient consult to General Surgery  Once        Provider:  Ha Juarez MD    Completed EUSEBIA DAVIDSON     Inpatient consult to General Surgery  Once        Provider:  Ha Juarez MD    Completed SOFIA MATIAS          * Closed fracture of left tibia and fibula  Patient with distal tib fib fracture   Ortho consulted on case and plans to operate later today  Cont npo  Morphine PRN  Patient with numerous comorbid conditions including  Cad, pvd, copd, diabetes  Poor functional capacity at home with METS < 4  Chest xray unremarkable  Ekg showed first degree AV block  Will obtain stat echo to evaluate LV function  Pt at least moderate risk for cardiac complications    Advance Care Planning     Patient is a full code and sdm is his sister.     8/5/22 POD#1   NWB LLE per  ortho recs Plan to leave the external fixation device on for approximately 6 weeks and then below-knee casting until fracture heals.  Continue pain management  PT/OT rec SNF     8/11- accepted for snf tomorrow  ; making adjustments on pain meds today     Essential hypertension  uncontrolled    Resume home metoprolol and amlodipine   Hydralazine PRN    8/11- switched lopressor to coreg        Final Active Diagnoses:    Diagnosis Date Noted POA    PRINCIPAL PROBLEM:  Closed fracture of left tibia and fibula [S82.202A, S82.402A] 08/04/2022 Yes    Essential hypertension [I10] 02/20/2015 Yes    Pressure injury of left hip, stage 2 [L89.222] 08/05/2022 Yes    Severe central sleep apnea comorbid with prescribed opioid use [F11.90, G47.37] 01/29/2020 Yes    Kidney transplant recipient [Z94.0] 04/07/2017 Not Applicable    Type 2 diabetes mellitus with stable proliferative retinopathy of both eyes, with long-term current use of insulin [E11.3553, Z79.4] 12/01/2016 Not Applicable    Coronary artery disease of native artery of native heart with stable angina pectoris [I25.118] 07/01/2016 Yes    Osteoarthritis of lumbar spine [M47.816]  Yes      Problems Resolved During this Admission:       Discharged Condition: good    Disposition: Skilled Nursing Facility    Follow Up:   Follow-up Information     Ha Juarez MD Follow up in 1 month(s).    Specialty: General Surgery  Why: post op appt, debridement of decubitus  Contact information:  [address]  [place] [address]  [phone]             [facility] Follow up.    Specialty: Skilled Nursing Facility  Contact information:  [address] [facility] [address] [phone]                     Patient Instructions:   No discharge procedures on file.    Significant Diagnostic Studies: reviewed     Pending Diagnostic Studies:     None         Medications:  Reconciled Home Medications:      Medication List      START taking  these medications    acetaminophen 500 MG tablet  Commonly known as: TYLENOL  Take 2 tablets (1,000 mg total) by mouth 3 (three) times daily.     apixaban 2.5 mg Tab  Commonly known as: ELIQUIS  Take 1 tablet (2.5 mg total) by mouth 2 (two) times daily. for 24 days     carvediloL 12.5 MG tablet  Commonly known as: COREG  Take 1 tablet (12.5 mg total) by mouth 2 (two) times daily.     collagenase ointment  Commonly known as: SANTYL  Apply topically once daily. To left buttock ulcer     sodium hypochlorite 0.125% external solution  Commonly known as: DAKIN'S SOLUTION  Apply topically 2 (two) times daily. Left buttock ulcer        CHANGE how you take these medications    OZEMPIC 1 mg/dose (2 mg/1.5 mL) Pnij  Generic drug: semaglutide  Inject 1 mg under the skin every 7 days.  What changed: additional instructions        CONTINUE taking these medications    amLODIPine 10 MG tablet  Commonly known as: NORVASC  Take 1 tablet (10 mg total) by mouth once daily.     aspirin 81 MG EC tablet  Commonly known as: ECOTRIN  Take 1 tablet (81 mg total) by mouth once daily.     atorvastatin 80 MG tablet  Commonly known as: LIPITOR  Take 1 tablet (80 mg total) by mouth once daily.     clopidogreL 75 mg tablet  Commonly known as: PLAVIX  Take 1 tablet (75 mg total) by mouth once daily.     ergocalciferol 50,000 unit Cap  Commonly known as: ERGOCALCIFEROL  Take 1 capsule (50,000 Units total) by mouth every 7 days. Take on Mondays     flash glucose scanning reader Misc  Commonly known as: FREESTYLE BRYAN 14 DAY READER  1 Device by Misc.(Non-Drug; Combo Route) route as needed.     FREESTYLE BRYAN 14 DAY SENSOR Kit  Generic drug: flash glucose sensor  1 kit by Misc.(Non-Drug; Combo Route) route every 14 (fourteen) days.     gabapentin 300 MG capsule  Commonly known as: NEURONTIN  Take 300 mg by mouth 3 (three) times daily.     hydrocortisone 2.5 % cream  Apply topically 2 (two) times daily.     insulin aspart U-100 100 unit/mL (3 mL) Inpn  pen  Commonly known as: NovoLOG  Inject 5 units three times a day with meal if BG > 300.     LIDOcaine 5 %  Commonly known as: LIDODERM  Place 1 patch onto the skin daily as needed. 12 hours on and 12 hours off     linaCLOtide 72 mcg Cap capsule  Commonly known as: LINZESS  Take 1 capsule (72 mcg total) by mouth before breakfast.     mycophenolate 250 mg Cap  Commonly known as: CELLCEPT  Take 1 capsule (250 mg total) by mouth 2 (two) times daily.     nitroGLYCERIN 0.4 MG SL tablet  Commonly known as: NITROSTAT  Place 1 tablet (0.4 mg total) under the tongue every 5 (five) minutes as needed.     ondansetron 4 MG Tbdl  Commonly known as: ZOFRAN-ODT  Take 1 tablet (4 mg total) by mouth every 8 (eight) hours as needed.     sertraline 25 MG tablet  Commonly known as: ZOLOFT  Take 1 tablet (25 mg total) by mouth once daily. For depression and anxiety     tacrolimus 0.5 MG Cap  Commonly known as: PROGRAF  Take 2 capsules (1 mg total) by mouth every 12 (twelve) hours.        STOP taking these medications    cloNIDine 0.1 MG tablet  Commonly known as: CATAPRES     furosemide 40 MG tablet  Commonly known as: LASIX     HYDROcodone-acetaminophen  mg per tablet  Commonly known as: NORCO     ipratropium 0.02 % nebulizer solution  Commonly known as: ATROVENT     isosorbide mononitrate 30 MG 24 hr tablet  Commonly known as: IMDUR     ketoconazole 2 % cream  Commonly known as: NIZORAL     levalbuterol 1.25 mg/3 mL nebulizer solution  Commonly known as: XOPENEX     metoprolol tartrate 25 MG tablet  Commonly known as: LOPRESSOR     naloxone 4 mg/actuation Spry  Commonly known as: NARCAN     sevelamer carbonate 800 mg Tab  Commonly known as: RENVELA     VIOS AEROSOL DELIVERY SYSTEM [place]  Generic drug: nebulizer and compressor        ASK your doctor about these medications    methocarbamoL 500 MG Tab  Commonly known as: ROBAXIN  Take 1 tablet (500 mg total) by mouth 4 (four) times daily. for 10 days  Ask about: Should I take this  medication?     oxyCODONE 15 MG Tab  Commonly known as: ROXICODONE  Take 1 tablet (15 mg total) by mouth every 4 (four) hours as needed for Pain.  Ask about: Should I take this medication?            Indwelling Lines/Drains at time of discharge:   Lines/Drains/Airways     None                 Time spent on the discharge of patient: 35 minutes         The attending portion of this evaluation, treatment, and documentation was performed per Krishna Zamora MD via Telemedicine AudioVisual using the secure eMagin software platform with 2 way audio/video. The provider was located off-site and the patient is located in the hospital. The aforementioned video software was utilized to document the relevant history and physical exam    Krishna Zamora MD  Department of Hospital Medicine  [facility]

## 2022-08-30 PROBLEM — G93.40 ACUTE ENCEPHALOPATHY: Status: ACTIVE | Noted: 2022-01-01

## 2022-08-30 PROBLEM — R65.10 SIRS (SYSTEMIC INFLAMMATORY RESPONSE SYNDROME): Status: ACTIVE | Noted: 2022-01-01

## 2022-08-30 PROBLEM — T68.XXXA HYPOTHERMIA: Status: ACTIVE | Noted: 2022-01-01

## 2022-08-30 PROBLEM — E87.1 HYPONATREMIA: Status: ACTIVE | Noted: 2022-01-01

## 2022-08-30 PROBLEM — J20.8 ACUTE BRONCHITIS DUE TO COVID-19 VIRUS: Status: ACTIVE | Noted: 2022-01-01

## 2022-08-30 PROBLEM — U07.1 ACUTE BRONCHITIS DUE TO COVID-19 VIRUS: Status: ACTIVE | Noted: 2022-01-01

## 2022-08-30 NOTE — H&P
"[facility]  [facility] Medicine  History & Physical    Patient Name: Lavelle Ladd  MRN: [id number]  Patient Class: OP- Observation  Admission Date: 2022  Attending Physician: Dr. Bartlett  Primary Care Provider: No primary care provider on file.         Patient information was obtained from patient and ER records.     Subjective:     Principal Problem:Acute encephalopathy    Chief Complaint:   Chief Complaint   Patient presents with    Altered Mental Status     Sent from [facility] for [facility], hypotension, hypoxia. Upon arrival of [facility], pupils pinpoint. Given Narcan. Complaining of pain all over. Ex fix to LLE from tib/fib fx. GCS 15        HPI: The patient is a 68 yo with HTN, CAD, DM, PVD, kidney transplant , COPD, right BKA, left transmetatarsal amputation, and s/p closed reduction left tibial and fibular shaft fractures with application of external fixation device on 22 who [facility] ED from [facility] with AMS, Hypoxemia, and hypotension. [facility] gave Narcan on scene with improvement in mental status and hypotension. Pt also complains of generalized generalized weakness, and diarrhea.     In the ED, Temp 95.9F, BP 90/52. WBC normal, BUN 66, Serum Cr 4.4. Bicarb 16. Procalcitonin 12.61. +COVID  CXr-showed nothing acute. UA pending     On exam, pt AAO to person, place, and date. However, he is unable to answer any of HPI or ROS questions. He only answers "I don't know" in an agitated tone.     The patient will be placed in observation under hospital medicine       Past Medical History:   Diagnosis Date    DINORAH (acute kidney injury) 2016    Arthritis     CAD in native artery 2019    CHF (congestive heart failure)     Chronic obstructive pulmonary disease 2016    Coronary artery disease involving native coronary artery of native heart without angina pectoris 2016    CRI (chronic renal insufficiency) 2019     donor kidney transplant for DM 16     Induction " with Thymo x3 and IV solumedrol to total 875mg  Kidney Biopsy  5/31/2016: 16 glomeruli, ACR type 1 AVR type 2, significant microcirculatory changes, c4d negative, No DSA, 5 to10% fibrosis. Treated with thymo x8 6/21/2016- no rejection      Diastolic heart failure     Encounter for blood transfusion     ESRD on RRT since 10/2013 10/29/2013    Biopsy proven diabetic nephropathy and lymphoplasmacytic interstitial infiltrate not c/w with AIN (ddx sjogrens or assoc with tamm-horsefall protein extravasation)     GERD (gastroesophageal reflux disease)     History of hepatitis C, s/p successful Rx w/ SVR12 - 4/2017 4/5/2017    Completed 12 weeks harvoni w/ SVR    Hyperlipidemia     Hypertension     PAD (peripheral artery disease) 7/21/2019    PIC line (peripherally inserted central catheter) flush     Prophylactic immunotherapy     Proteinuria     PVD (peripheral vascular disease) 6/26/2017    RLE BKA CT 12/11/16 Extensive atherosclerotic disease of the aorta and mesenteric arteries.     Renal hypertension     Type 2 diabetes mellitus with diabetic neuropathy, with long-term current use of insulin 12/1/2016    Vitamin B12 deficiency        Past Surgical History:   Procedure Laterality Date    AORTOGRAPHY WITH SERIALOGRAPHY N/A 6/14/2018    Procedure: LEFT LEG ANGIOGRAM;  Surgeon: Donal Mcdonald MD;  Location: [facility] CATH LAB;  Service: Vascular;  Laterality: N/A;    APPLICATION OF LARGE EXTERNAL FIXATION DEVICE TO TIBIA Left 8/4/2022    Procedure: APPLICATION, EXTERNAL FIXATION DEVICE, LARGE, TIBIA;  Surgeon: Good Villa MD;  Location: [facility] OR;  Service: Orthopedics;  Laterality: Left;    av bovine graft      Left UE    AV FISTULA PLACEMENT      left UE    CARDIAC CATHETERIZATION  02/2015    CLOSED REDUCTION OF INJURY OF TIBIA Left 8/4/2022    Procedure: CLOSED REDUCTION, TIBIA;  Surgeon: Good Villa MD;  Location: [facility] OR;  Service: Orthopedics;  Laterality: Left;  Closed reduction left tibial and fibular shaft  fractures    CLOSURE OF WOUND Left 9/24/2018    Procedure: CLOSURE, WOUND;  Surgeon: Karla Wheeler DPM;  Location: [facility] OR;  Service: Podiatry;  Laterality: Left;  Secondary Wound closure, extensive    CLOSURE OF WOUND Left 11/5/2018    Procedure: CLOSURE, WOUND;  Surgeon: Karla Wheeler DPM;  Location: [facility] OR;  Service: Podiatry;  Laterality: Left;    DEBRIDEMENT OF MULTIPLE METATARSAL BONES Left 11/5/2018    Procedure: DEBRIDEMENT, METATARSAL BONE, 2 OR MORE BONES;  Surgeon: Karla Wheeler DPM;  Location: [facility] OR;  Service: Podiatry;  Laterality: Left;    EXCISION OF SKIN Left 9/27/2019    Procedure: EXCISION, SKIN;  Surgeon: Lenard Alarcon MD;  Location: [address];  Service: Plastics;  Laterality: Left;  Plastics set, NIMS monitor, ACell    FOOT AMPUTATION THROUGH METATARSAL Left 9/21/2018    Procedure: AMPUTATION, FOOT, TRANSMETATARSAL;  Surgeon: Karla Wheeler DPM;  Location: [facility] OR;  Service: Podiatry;  Laterality: Left;    FOOT AMPUTATION THROUGH METATARSAL Left 10/31/2018    Procedure: AMPUTATION, FOOT, TRANSMETATARSAL;  Surgeon: Karla Wheeler DPM;  Location: [facility] OR;  Service: Podiatry;  Laterality: Left;    FOOT AMPUTATION THROUGH METATARSAL Left 11/5/2018    Procedure: AMPUTATION, FOOT, TRANSMETATARSAL;  Surgeon: Karla Wheeler DPM;  Location: [facility] OR;  Service: Podiatry;  Laterality: Left;  revisional transmetatarsal amputation, Left foot    IRRIGATION AND DEBRIDEMENT OF LOWER EXTREMITY Left 10/31/2018    Procedure: IRRIGATION AND DEBRIDEMENT, LOWER EXTREMITY;  Surgeon: Karla Wheeler DPM;  Location: [facility] OR;  Service: Podiatry;  Laterality: Left;    KIDNEY TRANSPLANT  05/21/2016    LEFT HEART CATHETERIZATION Left 7/21/2019    Procedure: CATHETERIZATION, HEART, LEFT;  Surgeon: Andrew Valdez MD;  Location: [facility] CATH LAB;  Service: Cardiology;  Laterality: Left;    LEG AMPUTATION THROUGH KNEE  2011    right LE, started as nail puncture leading to diabetic  ulcer    SKIN FULL THICKNESS GRAFT Left 10/7/2019    Procedure: APPLICATION, GRAFT, SKIN, FULL-THICKNESS;  Surgeon: Lenard Alarcon MD;  Location: [facility] OR [address] [place] [address];  Service: Plastics;  Laterality: Left;    SURGICAL REMOVAL OF LESION OF FACE Right 10/7/2019    Procedure: EXCISION, LESION, FACE;  Surgeon: Lenard Alarcon MD;  Location: [facility] OR [address] [place] [address];  Service: Plastics;  Laterality: Right;       Review of patient's allergies indicates:  No Known Allergies    No current facility-administered medications on file prior to encounter.     Current Outpatient Medications on File Prior to Encounter   Medication Sig    acetaminophen (TYLENOL) 500 MG tablet Take 2 tablets (1,000 mg total) by mouth 3 (three) times daily.    amLODIPine (NORVASC) 10 MG tablet Take 10 mg by mouth once daily.    apixaban (ELIQUIS) 2.5 mg Tab Take 1 tablet (2.5 mg total) by mouth 2 (two) times daily. for 24 days    atorvastatin (LIPITOR) 80 MG tablet Take 80 mg by mouth once daily.    carvediloL (COREG) 12.5 MG tablet Take 1 tablet (12.5 mg total) by mouth 2 (two) times daily.    ergocalciferol (ERGOCALCIFEROL) 50,000 unit Cap Take 1 capsule (50,000 Units total) by mouth every 7 days. Take on Mondays    gabapentin (NEURONTIN) 300 MG capsule Take 300 mg by mouth 3 (three) times daily.    linaCLOtide (LINZESS) 72 mcg Cap capsule Take 1 capsule (72 mcg total) by mouth before breakfast.    mycophenolate (CELLCEPT) 250 mg Cap Take 250 mg by mouth 2 (two) times daily.    oxyCODONE (ROXICODONE) 15 MG Tab Take 10 mg by mouth every 4 (four) hours as needed for Pain.    semaglutide (OZEMPIC) 1 mg/dose (2 mg/1.5 mL) PnIj Inject 1 mg under the skin every 7 days.    sertraline (ZOLOFT) 25 MG tablet Take 25 mg by mouth once daily.    tacrolimus (PROGRAF) 0.5 MG Cap Take 2 capsules (1 mg total) by mouth every 12 (twelve) hours.    [DISCONTINUED] amLODIPine (NORVASC) 10 MG tablet Take 1 tablet (10 mg total) by mouth once daily.    [DISCONTINUED] atorvastatin  (LIPITOR) 80 MG tablet Take 1 tablet (80 mg total) by mouth once daily.    nitroGLYCERIN (NITROSTAT) 0.4 MG SL tablet Place 1 tablet (0.4 mg total) under the tongue every 5 (five) minutes as needed.    ondansetron (ZOFRAN) 4 MG tablet Take 4 mg by mouth every 8 (eight) hours as needed for Nausea.    [DISCONTINUED] aspirin (ECOTRIN) 81 MG EC tablet Take 1 tablet (81 mg total) by mouth once daily.    [DISCONTINUED] cloNIDine (CATAPRES) 0.1 MG tablet Take 1 tablet (0.1 mg total) by mouth 3 (three) times daily. (Patient not taking: Reported on 8/4/2022)    [DISCONTINUED] clopidogreL (PLAVIX) 75 mg tablet Take 1 tablet (75 mg total) by mouth once daily.    [DISCONTINUED] collagenase (SANTYL) ointment Apply topically once daily. To left buttock ulcer    [DISCONTINUED] flash glucose scanning reader (FREESTYLE BRYAN 14 DAY READER) Misc 1 Device by Misc.(Non-Drug; Combo Route) route as needed.    [DISCONTINUED] flash glucose sensor (FREESTYLE BRYAN 14 DAY SENSOR) Kit 1 kit by Misc.(Non-Drug; Combo Route) route every 14 (fourteen) days.    [DISCONTINUED] furosemide (LASIX) 40 MG tablet Take 1 tablet (40 mg total) by mouth daily as needed (Leg swelling, Nocturnal SOB).    [DISCONTINUED] hydrocortisone 2.5 % cream Apply topically 2 (two) times daily.    [DISCONTINUED] insulin aspart U-100 (NOVOLOG) 100 unit/mL (3 mL) InPn pen Inject 5 units three times a day with meal if BG > 300.    [DISCONTINUED] ipratropium (ATROVENT) 0.02 % nebulizer solution Take 2.5 mLs (500 mcg total) by nebulization 4 (four) times daily. (Patient not taking: Reported on 8/4/2022)    [DISCONTINUED] isosorbide mononitrate (IMDUR) 30 MG 24 hr tablet Take 2 tablets (60 mg total) by mouth once daily. (Patient not taking: Reported on 8/4/2022)    [DISCONTINUED] ketoconazole (NIZORAL) 2 % cream Apply topically 2 (two) times daily.    [DISCONTINUED] levalbuterol (XOPENEX) 1.25 mg/3 mL nebulizer solution Take 3 mLs (1.25 mg total) by nebulization every 6 to 8  hours as needed for Wheezing or Shortness of Breath.    [DISCONTINUED] LIDOcaine (LIDODERM) 5 % Place 1 patch onto the skin daily as needed. 12 hours on and 12 hours off    [DISCONTINUED] metoprolol tartrate (LOPRESSOR) 25 MG tablet Take 1 tablet (25 mg total) by mouth 2 (two) times daily.    [DISCONTINUED] mycophenolate (CELLCEPT) 250 mg Cap Take 1 capsule (250 mg total) by mouth 2 (two) times daily.    [DISCONTINUED] ondansetron (ZOFRAN-ODT) 4 MG TbDL Take 1 tablet (4 mg total) by mouth every 8 (eight) hours as needed.    [DISCONTINUED] sertraline (ZOLOFT) 25 MG tablet Take 1 tablet (25 mg total) by mouth once daily. For depression and anxiety    [DISCONTINUED] sodium hypochlorite 0.125% (DAKIN'S SOLUTION) external solution Apply topically 2 (two) times daily. Left buttock ulcer     Family History       Problem Relation (Age of Onset)    Cancer Father    Diabetes Father    Heart failure Father, Mother    Stroke Father          Tobacco Use    Smoking status: Former     Packs/day: 1.00     Years: 40.00     Pack years: 40.00     Types: Cigarettes     Quit date: 2013     Years since quittin.6    Smokeless tobacco: Never   Substance and Sexual Activity    Alcohol use: Yes     Alcohol/week: 6.0 standard drinks     Types: 6 Cans of beer per week     Comment: seldom    Drug use: No    Sexual activity: Never     Review of Systems   Unable to perform ROS: Mental status change   Objective:     Vital Signs (Most Recent):  Temp: (!) 95.9 °F (35.5 °C) (22 1506)  Pulse: (!) 55 (22 1645)  Resp: 11 (22 1645)  BP: (!) 100/53 (22 1645)  SpO2: (!) 93 % (22 1645)   Vital Signs (24h Range):  Temp:  [95.9 °F (35.5 °C)] 95.9 °F (35.5 °C)  Pulse:  [55-75] 55  Resp:  [11-30] 11  SpO2:  [90 %-98 %] 93 %  BP: ()/(52-53) 100/53     Weight: 98.4 kg (216 lb 14.4 oz)  Body mass index is 30.25 kg/m².    Physical Exam  Vitals and nursing note reviewed.   Constitutional:       Appearance: He is  well-developed.   HENT:      Head: Normocephalic and atraumatic.      Nose: Nose normal.   Eyes:      General: No scleral icterus.     Pupils: Pupils are equal, round, and reactive to light.   Cardiovascular:      Rate and Rhythm: Normal rate and regular rhythm.      Heart sounds: Normal heart sounds. No murmur heard.    No friction rub. No gallop.   Pulmonary:      Effort: Pulmonary effort is normal. No respiratory distress.      Breath sounds: Wheezing and rhonchi present.   Abdominal:      General: Bowel sounds are normal. There is no distension.      Palpations: Abdomen is soft.      Tenderness: There is no abdominal tenderness.   Musculoskeletal:         General: Normal range of motion.      Cervical back: Normal range of motion and neck supple.      Comments: Right BKA- incision healed   Left TMA with external fixator on left leg. Incision at TMA healed.   Pin sites for external fixator all without erythema or drainage    Skin:     General: Skin is warm and dry.      Coloration: Skin is pale.      Findings: No rash.   Neurological:      Mental Status: He is alert and oriented to person, place, and time.      Cranial Nerves: No cranial nerve deficit.   Psychiatric:         Behavior: Behavior is agitated.         Cognition and Memory: Cognition is impaired.         CRANIAL NERVES     CN III, IV, VI   Pupils are equal, round, and reactive to light.     Significant Labs: All pertinent labs within the past 24 hours have been reviewed.    Significant Imaging: I have reviewed all pertinent imaging results/findings within the past 24 hours.    Assessment/Plan:     * Acute encephalopathy  Pt takes Oxycodone at [facility]  Narcan given PTA with good response   Also likely metabolic encephalopathy 2/2 ?infection, DINORAH/uremia, hyponatremia  Monitor       DINORAH (acute kidney injury)  Patient with acute kidney injury likely due to IVVD/dehydration DINORAH is currently worsening. Labs reviewed- Renal function/electrolytes with Estimated  Creatinine Clearance: 18.9 mL/min (A) (based on SCr of 4.4 mg/dL (H)). according to latest data. Monitor urine output and serial BMP and adjust therapy as needed. Avoid nephrotoxins and renally dose meds for GFR listed above.     Likely 2/2 recent reported diarrhea   IVFs  Check prograf level     SIRS (systemic inflammatory response syndrome)  Hypothermia, tachycardia, Procal 12.6 with AMS, hypoxemia, and DINORAH  Empiric IV Cefepime  Blood cultures pending   UA pending         Acute bronchitis due to COVID-19 virus  Patient is identified as Severe COVID-19 based on hypoxemia with O2 saturations <94% on room air or on ambulation   Initiate standard COVID protocols; COVID-19 testing ,Infection Control notification  and isolation- respiratory, contact and droplet per protocol    Diagnostics: (leukopenia, hyponatremia, hyperferritinemia, elevated troponin, elevated d-dimer, age, and comorbidities are significant predictors of poor clinical outcome)  CBC, CMP, Procalcitonin, Ferritin, CRP, LDH, BNP and Portable CXR    Management: Initiate targeted therapy with Remdesivir, 200mg IV x1, followed by 100mg IV daily x5 days total, Dexamethasone PO/IV 6mg daily x10 days and Anticoagulation, Patient admitted to non-critical care unit- Will initiate full dose anticoagulation with continuing Eliquis and Inhaled bronchodilators as needed for shortness of breath.    Hypothermia  Likely 2/2 unintentional overdose  Check TSH, Free T4  Warming blanket    Hyponatremia  NS iVFs  Monitor       Metabolic acidosis  Likely 2/2 DINORAH, dehydration   IVFs       Type 2 diabetes mellitus with hyperglycemia, with long-term current use of insulin  Patient's FSGs are uncontrolled due to hyperglycemia on current medication regimen.  Last A1c reviewed-   Lab Results   Component Value Date    HGBA1C 7.4 (H) 08/04/2022     Most recent fingerstick glucose reviewed-   Recent Labs   Lab 08/30/22  1459   POCTGLUCOSE 280*     Current correctional scale   Low  Maintain anti-hyperglycemic dose as follows-   Antihyperglycemics (From admission, onward)      Start     Stop Route Frequency Ordered    08/30/22 1825  insulin aspart U-100 pen 0-5 Units  (INSULIN - LOW CORRECTION DOSE (Recommended for BMI <25, GFR<15 or on dialysis, Age > 70, type 1 diabetes, ESLD, severe CHF or patients on <70 units of insulin daily))         -- SubQ Before meals & nightly PRN 08/30/22 1726          Hold Oral hypoglycemics while patient is in the hospital.    Closed fracture of left tibia and fibula  External fixator in place   Continue Eliquis for DVT ppx      Hx of right BKA        S/P transmetatarsal amputation of foot, left  Site- healed, no breakdown  Monitor     Renal transplant, status post  Renal transplant in 2016  Consult nephrology   Cont tacrolimus-check level  Hold Cellcept due to infection       Coronary artery disease of native artery of native heart with stable angina pectoris  No CP  Cont ASA, Plavix, statin  Hold BB      Essential hypertension  BP low- hold antihypertensives         VTE Risk Mitigation (From admission, onward)           Ordered     apixaban tablet 2.5 mg  2 times daily         08/30/22 1726                       Ashley Tavarez NP  Department of Hospital Medicine   '[facility] - Emergency Dept.

## 2022-08-30 NOTE — ED PROVIDER NOTES
SCRIBE #1 NOTE: I, Bradley Wright, am scribing for, and in the presence of, Judd Rasmussen MD. I have scribed the HPI, ROS, and PEx.     SCRIBE #2 NOTE: I, Jolly Wakefield, am scribing for, and in the presence of,  Chandu Harris Jr., MD. I have scribed the remaining portions of the note not scribed by Scribe #1.   History      Chief Complaint   Patient presents with    Altered Mental Status     Sent from [facility] for AMS, hypotension, hypoxia. Upon arrival of [facility], pupils pinpoint. Given Narcan. Complaining of pain all over. Ex fix to LLE from tib/fib fx. GCS 15       Review of patient's allergies indicates:  No Known Allergies     HPI   HPI    2022, 3:05 PM   History obtained from the patient and [facility]      History of Present Illness: Lavelle Ladd is a 67 y.o. male patient with a PMHx of CAD, CHF, COPD, HTN, DM2 who presents to the Emergency Department for possible drug overdose just PTA. Pt is a resident at [facility] and was found unresponsive, hypoxic, and hypotensive upon [facility] arrival. Pt was given Narcan at the scene, with positive response. Symptoms are constant and moderate in severity. No mitigating or exacerbating factors reported. Associated sxs include chest pain, generalized weakness, and diarrhea. Patient denies any fever, chills, n/v, SOB, numbness, dizziness, headache, and all other sxs at this time. No further complaints or concerns at this time.     Arrival mode[facility]    PCP: No primary care provider on file.       Past Medical History:  Past Medical History:   Diagnosis Date    DINORAH (acute kidney injury) 2016    Arthritis     CAD in native artery 2019    CHF (congestive heart failure)     Chronic obstructive pulmonary disease 2016    Coronary artery disease involving native coronary artery of native heart without angina pectoris 2016    CRI (chronic renal insufficiency) 2019     donor kidney transplant for DM 16     Induction with Thymo x3 and IV  solumedrol to total 875mg  Kidney Biopsy  5/31/2016: 16 glomeruli, ACR type 1 AVR type 2, significant microcirculatory changes, c4d negative, No DSA, 5 to10% fibrosis. Treated with thymo x8 6/21/2016- no rejection      Diastolic heart failure     Encounter for blood transfusion     ESRD on RRT since 10/2013 10/29/2013    Biopsy proven diabetic nephropathy and lymphoplasmacytic interstitial infiltrate not c/w with AIN (ddx sjogrens or assoc with tamm-horsefall protein extravasation)     GERD (gastroesophageal reflux disease)     History of hepatitis C, s/p successful Rx w/ SVR12 - 4/2017 4/5/2017    Completed 12 weeks harvoni w/ SVR    Hyperlipidemia     Hypertension     PAD (peripheral artery disease) 7/21/2019    PIC line (peripherally inserted central catheter) flush     Prophylactic immunotherapy     Proteinuria     PVD (peripheral vascular disease) 6/26/2017    RLE BKA CT 12/11/16 Extensive atherosclerotic disease of the aorta and mesenteric arteries.     Renal hypertension     Type 2 diabetes mellitus with diabetic neuropathy, with long-term current use of insulin 12/1/2016    Vitamin B12 deficiency        Past Surgical History:  Past Surgical History:   Procedure Laterality Date    AORTOGRAPHY WITH SERIALOGRAPHY N/A 6/14/2018    Procedure: LEFT LEG ANGIOGRAM;  Surgeon: Donal Mcdonald MD;  Location: [facility] CATH LAB;  Service: Vascular;  Laterality: N/A;    APPLICATION OF LARGE EXTERNAL FIXATION DEVICE TO TIBIA Left 8/4/2022    Procedure: APPLICATION, EXTERNAL FIXATION DEVICE, LARGE, TIBIA;  Surgeon: Good Villa MD;  Location: [facility] OR;  Service: Orthopedics;  Laterality: Left;    av bovine graft      Left UE    AV FISTULA PLACEMENT      left UE    CARDIAC CATHETERIZATION  02/2015    CLOSED REDUCTION OF INJURY OF TIBIA Left 8/4/2022    Procedure: CLOSED REDUCTION, TIBIA;  Surgeon: Good Villa MD;  Location: [facility] OR;  Service: Orthopedics;  Laterality: Left;  Closed reduction left tibial and fibular shaft  fractures    CLOSURE OF WOUND Left 9/24/2018    Procedure: CLOSURE, WOUND;  Surgeon: Karla Wheeler DPM;  Location: [facility] OR;  Service: Podiatry;  Laterality: Left;  Secondary Wound closure, extensive    CLOSURE OF WOUND Left 11/5/2018    Procedure: CLOSURE, WOUND;  Surgeon: Karla Wheeler DPM;  Location: [facility] OR;  Service: Podiatry;  Laterality: Left;    DEBRIDEMENT OF MULTIPLE METATARSAL BONES Left 11/5/2018    Procedure: DEBRIDEMENT, METATARSAL BONE, 2 OR MORE BONES;  Surgeon: Karal Wheeler DPM;  Location: [facility] OR;  Service: Podiatry;  Laterality: Left;    EXCISION OF SKIN Left 9/27/2019    Procedure: EXCISION, SKIN;  Surgeon: Lenard Alarcon MD;  Location: [address];  Service: Plastics;  Laterality: Left;  Plastics set, NIMS monitor, ACell    FOOT AMPUTATION THROUGH METATARSAL Left 9/21/2018    Procedure: AMPUTATION, FOOT, TRANSMETATARSAL;  Surgeon: Karla Wheeler DPM;  Location: [facility] OR;  Service: Podiatry;  Laterality: Left;    FOOT AMPUTATION THROUGH METATARSAL Left 10/31/2018    Procedure: AMPUTATION, FOOT, TRANSMETATARSAL;  Surgeon: Karla Wheeler DPM;  Location: [facility] OR;  Service: Podiatry;  Laterality: Left;    FOOT AMPUTATION THROUGH METATARSAL Left 11/5/2018    Procedure: AMPUTATION, FOOT, TRANSMETATARSAL;  Surgeon: Karla Wheeler DPM;  Location: [facility] OR;  Service: Podiatry;  Laterality: Left;  revisional transmetatarsal amputation, Left foot    IRRIGATION AND DEBRIDEMENT OF LOWER EXTREMITY Left 10/31/2018    Procedure: IRRIGATION AND DEBRIDEMENT, LOWER EXTREMITY;  Surgeon: Karla Wheeler DPM;  Location: [facility] OR;  Service: Podiatry;  Laterality: Left;    KIDNEY TRANSPLANT  05/21/2016    LEFT HEART CATHETERIZATION Left 7/21/2019    Procedure: CATHETERIZATION, HEART, LEFT;  Surgeon: Andrew Valdez MD;  Location: [facility] CATH LAB;  Service: Cardiology;  Laterality: Left;    LEG AMPUTATION THROUGH KNEE  2011    right LE, started as nail puncture leading to diabetic  ulcer    SKIN FULL THICKNESS GRAFT Left 10/7/2019    Procedure: APPLICATION, GRAFT, SKIN, FULL-THICKNESS;  Surgeon: Lenard Alarcon MD;  Location: [facility] OR [address] [place] [address];  Service: Plastics;  Laterality: Left;    SURGICAL REMOVAL OF LESION OF FACE Right 10/7/2019    Procedure: EXCISION, LESION, FACE;  Surgeon: Lenard Alarcon MD;  Location: [facility] OR [address] [place] [address];  Service: Plastics;  Laterality: Right;         Family History:  Family History   Problem Relation Age of Onset    Cancer Father     Diabetes Father     Heart failure Father     Stroke Father     Heart failure Mother     Kidney disease Neg Hx        Social History:  Social History     Tobacco Use    Smoking status: Former     Packs/day: 1.00     Years: 40.00     Pack years: 40.00     Types: Cigarettes     Quit date: 2013     Years since quittin.6    Smokeless tobacco: Never   Substance and Sexual Activity    Alcohol use: Yes     Alcohol/week: 6.0 standard drinks     Types: 6 Cans of beer per week     Comment: seldom    Drug use: No    Sexual activity: Never       ROS   Review of Systems   Constitutional:  Negative for chills and fever.   HENT:  Negative for sore throat.    Respiratory:  Negative for shortness of breath.    Cardiovascular:  Positive for chest pain.   Gastrointestinal:  Positive for diarrhea. Negative for nausea and vomiting.   Genitourinary:  Negative for dysuria.   Musculoskeletal:  Negative for back pain.   Skin:  Negative for rash.   Neurological:  Positive for weakness (generalized). Negative for dizziness, light-headedness, numbness and headaches.   Hematological:  Does not bruise/bleed easily.   All other systems reviewed and are negative.    Physical Exam      Initial Vitals [22 1506]   BP Pulse Resp Temp SpO2   (!) 90/52 75 (!) 30 (!) 95.9 °F (35.5 °C) (!) 90 %      MAP       --          Physical Exam  Nursing Notes and Vital Signs Reviewed.  Constitutional: Patient is in no acute distress. Appears older than stated age.  Head:  Atraumatic. Normocephalic.  Eyes: PERRL. EOM intact. Conjunctivae are not pale. No scleral icterus.  ENT: Mucous membranes are moist. Oropharynx is clear and symmetric.    Neck: Supple. Full ROM. No lymphadenopathy.  Cardiovascular: Regular rate. Regular rhythm. No murmurs, rubs, or gallops. Distal pulses are 2+ and symmetric.  Pulmonary/Chest: No respiratory distress. Clear to auscultation bilaterally. No wheezing or rales.  Abdominal: Soft and non-distended.  There is no tenderness.  No rebound, guarding, or rigidity.   Musculoskeletal: Postsurgical changes to the BLE, with X-fix in place to the LLE. No edema.  Skin: Warm and dry.  Neurological:  Alert, awake, and appropriate.  Normal speech.  No acute focal neurological deficits are appreciated.  Psychiatric: Normal affect. Good eye contact. Appropriate in content.    ED Course    Procedures  ED Vital Signs:  Vitals:    08/30/22 1506 08/30/22 1530 08/30/22 1600 08/30/22 1615   BP: (!) 90/52 (!) 107/53 (!) 103/53 (!) 110/53   Pulse: 75 (!) 59 (!) 56 (!) 55   Resp: (!) 30 (!) 21 14 12   Temp: (!) 95.9 °F (35.5 °C)      TempSrc: Rectal      SpO2: (!) 90% (!) 91% 98% 95%   Weight: 98.4 kg (216 lb 14.4 oz)       08/30/22 1645   BP: (!) 100/53   Pulse: (!) 55   Resp: 11   Temp:    TempSrc:    SpO2: (!) 93%   Weight:        Abnormal Lab Results:  Labs Reviewed   CBC W/ AUTO DIFFERENTIAL - Abnormal; Notable for the following components:       Result Value    RBC 3.98 (*)     Hemoglobin 11.8 (*)     Hematocrit 36.7 (*)     Immature Granulocytes 1.5 (*)     Gran # (ANC) 8.3 (*)     Immature Grans (Abs) 0.16 (*)     Gran % 79.6 (*)     Lymph % 10.3 (*)     All other components within normal limits   COMPREHENSIVE METABOLIC PANEL - Abnormal; Notable for the following components:    Sodium 131 (*)     CO2 16 (*)     Glucose 256 (*)     BUN 66 (*)     Creatinine 4.4 (*)     Albumin 2.3 (*)     Alkaline Phosphatase 156 (*)     AST 75 (*)     eGFR 14 (*)     All other components  within normal limits   B-TYPE NATRIURETIC PEPTIDE - Abnormal; Notable for the following components:     (*)     All other components within normal limits   PROCALCITONIN - Abnormal; Notable for the following components:    Procalcitonin 12.61 (*)     All other components within normal limits   SARS-COV-2 RNA AMPLIFICATION, QUAL - Abnormal; Notable for the following components:    SARS-CoV-2 RNA, Amplification, Qual Positive (*)     All other components within normal limits   POCT GLUCOSE - Abnormal; Notable for the following components:    POCT Glucose 280 (*)     All other components within normal limits   CULTURE, BLOOD   CULTURE, BLOOD   LACTIC ACID, PLASMA   TROPONIN I   URINALYSIS, REFLEX TO URINE CULTURE   LACTIC ACID, PLASMA   D DIMER, QUANTITATIVE   TACROLIMUS LEVEL   PROTIME-INR   APTT   SEDIMENTATION RATE   CK   LACTATE DEHYDROGENASE   FERRITIN        All Lab Results:  Results for orders placed or performed during the hospital encounter of 08/30/22   CBC auto differential   Result Value Ref Range    WBC 10.46 3.90 - 12.70 K/uL    RBC 3.98 (L) 4.60 - 6.20 M/uL    Hemoglobin 11.8 (L) 14.0 - 18.0 g/dL    Hematocrit 36.7 (L) 40.0 - 54.0 %    MCV 92 82 - 98 fL    MCH 29.6 27.0 - 31.0 pg    MCHC 32.2 32.0 - 36.0 g/dL    RDW 13.7 11.5 - 14.5 %    Platelets 199 150 - 450 K/uL    MPV 10.5 9.2 - 12.9 fL    Immature Granulocytes 1.5 (H) 0.0 - 0.5 %    Gran # (ANC) 8.3 (H) 1.8 - 7.7 K/uL    Immature Grans (Abs) 0.16 (H) 0.00 - 0.04 K/uL    Lymph # 1.1 1.0 - 4.8 K/uL    Mono # 0.9 0.3 - 1.0 K/uL    Eos # 0.0 0.0 - 0.5 K/uL    Baso # 0.01 0.00 - 0.20 K/uL    nRBC 0 0 /100 WBC    Gran % 79.6 (H) 38.0 - 73.0 %    Lymph % 10.3 (L) 18.0 - 48.0 %    Mono % 8.5 4.0 - 15.0 %    Eosinophil % 0.0 0.0 - 8.0 %    Basophil % 0.1 0.0 - 1.9 %    Differential Method Automated    Comprehensive metabolic panel   Result Value Ref Range    Sodium 131 (L) 136 - 145 mmol/L    Potassium 3.7 3.5 - 5.1 mmol/L    Chloride 99 95 - 110  mmol/L    CO2 16 (L) 23 - 29 mmol/L    Glucose 256 (H) 70 - 110 mg/dL    BUN 66 (H) 8 - 23 mg/dL    Creatinine 4.4 (H) 0.5 - 1.4 mg/dL    Calcium 8.8 8.7 - 10.5 mg/dL    Total Protein 6.7 6.0 - 8.4 g/dL    Albumin 2.3 (L) 3.5 - 5.2 g/dL    Total Bilirubin 0.5 0.1 - 1.0 mg/dL    Alkaline Phosphatase 156 (H) 55 - 135 U/L    AST 75 (H) 10 - 40 U/L    ALT 32 10 - 44 U/L    Anion Gap 16 8 - 16 mmol/L    eGFR 14 (A) >60 mL/min/1.73 m^2   Lactic acid, plasma #1   Result Value Ref Range    Lactate (Lactic Acid) 2.0 0.5 - 2.2 mmol/L   Brain natriuretic peptide   Result Value Ref Range     (H) 0 - 99 pg/mL   Troponin I   Result Value Ref Range    Troponin I 0.023 0.000 - 0.026 ng/mL   Procalcitonin   Result Value Ref Range    Procalcitonin 12.61 (H) <0.25 ng/mL   COVID-19 Rapid Screening   Result Value Ref Range    SARS-CoV-2 RNA, Amplification, Qual Positive (A) Negative   POCT glucose   Result Value Ref Range    POCT Glucose 280 (H) 70 - 110 mg/dL     *Note: Due to a large number of results and/or encounters for the requested time period, some results have not been displayed. A complete set of results can be found in Results Review.       Imaging Results:  Imaging Results              X-Ray Chest AP Portable (Final result)  Result time 08/30/22 15:23:13      Final result by Angie Norman MD (08/30/22 15:23:13)                   Impression:      No acute cardiopulmonary abnormality.      Electronically signed by: Angie Norman  Date:    08/30/2022  Time:    15:23               Narrative:    EXAMINATION:  XR CHEST AP PORTABLE    CLINICAL HISTORY:  Sepsis;    TECHNIQUE:  Single frontal view of the chest was performed.    COMPARISON:  Chest radiograph 08/04/2022    FINDINGS:  ECG monitoring leads overlie the chest.  A right PICC line terminates over the SVC.The lungs are clear, with normal appearance of pulmonary vasculature and no pleural effusion or pneumothorax.    The cardiac silhouette is normal in  size.    Bones are intact.                                     The EKG was ordered, reviewed, and independently interpreted by the ED provider.  Interpretation time: 15:01  Rate: 62 BPM  Rhythm: Sinus rhythm with 1st degree AV block  Interpretation: Low voltage QRS. Nonspecific ST and T wave abnormality. No STEMI.           The Emergency Provider reviewed the vital signs and test results, which are outlined above.    ED Discussion     4:00 PM: Dr. Rasmussen transfers care of patient to Dr. Harris pending Hospital Medicine consult.    4:10 PM: Discussed case with Cholo Bradley NP (Hospital Medicine). Dr. Bartlett agrees with current care and management of pt and accepts admission.   Admitting Service: Hospital Medicine  Admitting Physician: Dr. Bartlett  Admit to: Obs/ tele    4:11 PM: Re-evaluated pt. I have discussed test results, shared treatment plan, and the need for admission with patient and family at bedside. Pt and family express understanding at this time and agree with all information. All questions answered. Pt and family have no further questions or concerns at this time. Pt is ready for admit.               ED Medication(s):  Medications   apixaban tablet 2.5 mg (has no administration in time range)   aspirin EC tablet 81 mg (has no administration in time range)   clopidogreL tablet 75 mg (has no administration in time range)   tacrolimus capsule 1 mg (has no administration in time range)   sertraline tablet 25 mg (has no administration in time range)   sodium chloride 0.9% flush 10 mL (has no administration in time range)   remdesivir 200 mg in sodium chloride 0.9% 250 mL infusion (has no administration in time range)   remdesivir 100 mg in sodium chloride 0.9% 250 mL infusion (has no administration in time range)   glucose chewable tablet 16 g (has no administration in time range)   glucose chewable tablet 24 g (has no administration in time range)   glucagon (human recombinant) injection 1 mg (has no  administration in time range)   albuterol inhaler 2 puff (has no administration in time range)   ascorbic acid (vitamin C) tablet 500 mg (has no administration in time range)   multivitamin tablet (has no administration in time range)   dexAMETHasone tablet 6 mg (has no administration in time range)   ondansetron injection 4 mg (has no administration in time range)   acetaminophen tablet 650 mg (has no administration in time range)   insulin aspart U-100 pen 0-5 Units (has no administration in time range)   acetaminophen tablet 650 mg (has no administration in time range)   lactated ringers bolus 2,952 mL (2,952 mLs Intravenous New Bag 8/30/22 1530)     New Prescriptions    No medications on file           Medical Decision Making    Medical Decision Making:   Clinical Tests:   Lab Tests: Ordered and Reviewed  Radiological Study: Ordered and Reviewed  Medical Tests: Ordered and Reviewed         Scribe Attestation:   Scribe #1: I performed the above scribed service and the documentation accurately describes the services I performed. I attest to the accuracy of the note.    Attending:   Physician Attestation Statement for Scribe #1: I, Judd Rasmussen MD, personally performed the services described in this documentation, as scribed by Bradley Wright, in my presence, and it is both accurate and complete.         Attending Attestation:           Physician Attestation for Scribe:    Physician Attestation Statement for Scribe #2: I, Chandu Harris Jr., MD, reviewed documentation, as scribed by Jolly Wakefield in my presence, and it is both accurate and complete. I also acknowledge and confirm the content of the note done by Scribe #1.        Clinical Impression       ICD-10-CM ICD-9-CM   1. Acute renal failure, unspecified acute renal failure type  N17.9 584.9   2. Weakness  R53.1 780.79   3. Hypoxia  R09.02 799.02   4. COVID-19 virus infection  U07.1 079.89       Disposition:   Disposition: Placed in  Observation  Condition: Kurt Harris Jr., MD  08/30/22 1160

## 2022-08-30 NOTE — PROGRESS NOTES
Pharmacist Renal Dose Adjustment Note    Lavelle Ladd is a 67 y.o. male being treated with the medication cefepime.     Patient Data:    Vital Signs (Most Recent):  Temp: (!) 95.9 °F (35.5 °C) (08/30/22 1506)  Pulse: (!) 55 (08/30/22 1645)  Resp: 11 (08/30/22 1645)  BP: (!) 100/53 (08/30/22 1645)  SpO2: (!) 93 % (08/30/22 1645)   Vital Signs (72h Range):  Temp:  [95.9 °F (35.5 °C)]   Pulse:  [55-75]   Resp:  [11-30]   BP: ()/(52-53)   SpO2:  [90 %-98 %]      Recent Labs   Lab 08/30/22  1520   CREATININE 4.4*     Serum creatinine: 4.4 mg/dL (H) 08/30/22 1520  Estimated creatinine clearance: 18.9 mL/min (A)    Cefepime 1 gram IV every 8 hours will be changed to cefepime 1 gram IV every 24 hours per renal dose protocol for CrCl 11-29 ml/min.     Thank you,  Pharmacist's Name: Shelton Barrios

## 2022-08-30 NOTE — SUBJECTIVE & OBJECTIVE
Past Medical History:   Diagnosis Date    DINORAH (acute kidney injury) 2016    Arthritis     CAD in native artery 2019    CHF (congestive heart failure)     Chronic obstructive pulmonary disease 2016    Coronary artery disease involving native coronary artery of native heart without angina pectoris 2016    CRI (chronic renal insufficiency) 2019     donor kidney transplant for DM 16     Induction with Thymo x3 and IV solumedrol to total 875mg  Kidney Biopsy  2016: 16 glomeruli, ACR type 1 AVR type 2, significant microcirculatory changes, c4d negative, No DSA, 5 to10% fibrosis. Treated with thymo x8 2016- no rejection      Diastolic heart failure     Encounter for blood transfusion     ESRD on RRT since 10/2013 10/29/2013    Biopsy proven diabetic nephropathy and lymphoplasmacytic interstitial infiltrate not c/w with AIN (ddx sjogrens or assoc with tamm-horsefall protein extravasation)     GERD (gastroesophageal reflux disease)     History of hepatitis C, s/p successful Rx w/ SVR12 - 2017    Completed 12 weeks harvoni w/ SVR    Hyperlipidemia     Hypertension     PAD (peripheral artery disease) 2019    PIC line (peripherally inserted central catheter) flush     Prophylactic immunotherapy     Proteinuria     PVD (peripheral vascular disease) 2017    RLE BKA CT 16 Extensive atherosclerotic disease of the aorta and mesenteric arteries.     Renal hypertension     Type 2 diabetes mellitus with diabetic neuropathy, with long-term current use of insulin 2016    Vitamin B12 deficiency        Past Surgical History:   Procedure Laterality Date    AORTOGRAPHY WITH SERIALOGRAPHY N/A 2018    Procedure: LEFT LEG ANGIOGRAM;  Surgeon: Donal Mcdonald MD;  Location: [facility] CATH LAB;  Service: Vascular;  Laterality: N/A;    APPLICATION OF LARGE EXTERNAL FIXATION DEVICE TO TIBIA Left 2022    Procedure: APPLICATION, EXTERNAL FIXATION DEVICE, LARGE, TIBIA;   Surgeon: Good Villa MD;  Location: [facility] OR;  Service: Orthopedics;  Laterality: Left;    av bovine graft      Left UE    AV FISTULA PLACEMENT      left UE    CARDIAC CATHETERIZATION  02/2015    CLOSED REDUCTION OF INJURY OF TIBIA Left 8/4/2022    Procedure: CLOSED REDUCTION, TIBIA;  Surgeon: Good Villa MD;  Location: [facility] OR;  Service: Orthopedics;  Laterality: Left;  Closed reduction left tibial and fibular shaft fractures    CLOSURE OF WOUND Left 9/24/2018    Procedure: CLOSURE, WOUND;  Surgeon: Karla Wheeler DPM;  Location: [facility] OR;  Service: Podiatry;  Laterality: Left;  Secondary Wound closure, extensive    CLOSURE OF WOUND Left 11/5/2018    Procedure: CLOSURE, WOUND;  Surgeon: Karla Wheeler DPM;  Location: [facility] OR;  Service: Podiatry;  Laterality: Left;    DEBRIDEMENT OF MULTIPLE METATARSAL BONES Left 11/5/2018    Procedure: DEBRIDEMENT, METATARSAL BONE, 2 OR MORE BONES;  Surgeon: Karla Wheeler DPM;  Location: [facility] OR;  Service: Podiatry;  Laterality: Left;    EXCISION OF SKIN Left 9/27/2019    Procedure: EXCISION, SKIN;  Surgeon: Lenard Alarcon MD;  Location: [address];  Service: Plastics;  Laterality: Left;  Plastics set, NIMS monitor, ACell    FOOT AMPUTATION THROUGH METATARSAL Left 9/21/2018    Procedure: AMPUTATION, FOOT, TRANSMETATARSAL;  Surgeon: Karla Wheeler DPM;  Location: [facility] OR;  Service: Podiatry;  Laterality: Left;    FOOT AMPUTATION THROUGH METATARSAL Left 10/31/2018    Procedure: AMPUTATION, FOOT, TRANSMETATARSAL;  Surgeon: Karla Wheeler DPM;  Location: [facility] OR;  Service: Podiatry;  Laterality: Left;    FOOT AMPUTATION THROUGH METATARSAL Left 11/5/2018    Procedure: AMPUTATION, FOOT, TRANSMETATARSAL;  Surgeon: Karla Wheeler DPM;  Location: [facility] OR;  Service: Podiatry;  Laterality: Left;  revisional transmetatarsal amputation, Left foot    IRRIGATION AND DEBRIDEMENT OF LOWER EXTREMITY Left 10/31/2018    Procedure: IRRIGATION AND  DEBRIDEMENT, LOWER EXTREMITY;  Surgeon: Karla Wheeler DPM;  Location: [facility] OR;  Service: Podiatry;  Laterality: Left;    KIDNEY TRANSPLANT  05/21/2016    LEFT HEART CATHETERIZATION Left 7/21/2019    Procedure: CATHETERIZATION, HEART, LEFT;  Surgeon: Andrew Valdez MD;  Location: [facility] CATH LAB;  Service: Cardiology;  Laterality: Left;    LEG AMPUTATION THROUGH KNEE  2011    right LE, started as nail puncture leading to diabetic ulcer    SKIN FULL THICKNESS GRAFT Left 10/7/2019    Procedure: APPLICATION, GRAFT, SKIN, FULL-THICKNESS;  Surgeon: Lenard Alarcon MD;  Location: [address];  Service: Plastics;  Laterality: Left;    SURGICAL REMOVAL OF LESION OF FACE Right 10/7/2019    Procedure: EXCISION, LESION, FACE;  Surgeon: Lenard Alarcon MD;  Location: [facility] OR [address] [place] [address];  Service: Plastics;  Laterality: Right;       Review of patient's allergies indicates:  No Known Allergies    No current facility-administered medications on file prior to encounter.     Current Outpatient Medications on File Prior to Encounter   Medication Sig    acetaminophen (TYLENOL) 500 MG tablet Take 2 tablets (1,000 mg total) by mouth 3 (three) times daily.    amLODIPine (NORVASC) 10 MG tablet Take 10 mg by mouth once daily.    apixaban (ELIQUIS) 2.5 mg Tab Take 1 tablet (2.5 mg total) by mouth 2 (two) times daily. for 24 days    atorvastatin (LIPITOR) 80 MG tablet Take 80 mg by mouth once daily.    carvediloL (COREG) 12.5 MG tablet Take 1 tablet (12.5 mg total) by mouth 2 (two) times daily.    ergocalciferol (ERGOCALCIFEROL) 50,000 unit Cap Take 1 capsule (50,000 Units total) by mouth every 7 days. Take on Mondays    gabapentin (NEURONTIN) 300 MG capsule Take 300 mg by mouth 3 (three) times daily.    linaCLOtide (LINZESS) 72 mcg Cap capsule Take 1 capsule (72 mcg total) by mouth before breakfast.    mycophenolate (CELLCEPT) 250 mg Cap Take 250 mg by mouth 2 (two) times daily.    oxyCODONE (ROXICODONE) 15 MG Tab Take 10 mg by mouth  every 4 (four) hours as needed for Pain.    semaglutide (OZEMPIC) 1 mg/dose (2 mg/1.5 mL) PnIj Inject 1 mg under the skin every 7 days.    sertraline (ZOLOFT) 25 MG tablet Take 25 mg by mouth once daily.    tacrolimus (PROGRAF) 0.5 MG Cap Take 2 capsules (1 mg total) by mouth every 12 (twelve) hours.    [DISCONTINUED] amLODIPine (NORVASC) 10 MG tablet Take 1 tablet (10 mg total) by mouth once daily.    [DISCONTINUED] atorvastatin (LIPITOR) 80 MG tablet Take 1 tablet (80 mg total) by mouth once daily.    nitroGLYCERIN (NITROSTAT) 0.4 MG SL tablet Place 1 tablet (0.4 mg total) under the tongue every 5 (five) minutes as needed.    ondansetron (ZOFRAN) 4 MG tablet Take 4 mg by mouth every 8 (eight) hours as needed for Nausea.    [DISCONTINUED] aspirin (ECOTRIN) 81 MG EC tablet Take 1 tablet (81 mg total) by mouth once daily.    [DISCONTINUED] cloNIDine (CATAPRES) 0.1 MG tablet Take 1 tablet (0.1 mg total) by mouth 3 (three) times daily. (Patient not taking: Reported on 8/4/2022)    [DISCONTINUED] clopidogreL (PLAVIX) 75 mg tablet Take 1 tablet (75 mg total) by mouth once daily.    [DISCONTINUED] collagenase (SANTYL) ointment Apply topically once daily. To left buttock ulcer    [DISCONTINUED] flash glucose scanning reader (FREESTYLE BRYAN 14 DAY READER) Misc 1 Device by Misc.(Non-Drug; Combo Route) route as needed.    [DISCONTINUED] flash glucose sensor (FREESTYLE BRYAN 14 DAY SENSOR) Kit 1 kit by Misc.(Non-Drug; Combo Route) route every 14 (fourteen) days.    [DISCONTINUED] furosemide (LASIX) 40 MG tablet Take 1 tablet (40 mg total) by mouth daily as needed (Leg swelling, Nocturnal SOB).    [DISCONTINUED] hydrocortisone 2.5 % cream Apply topically 2 (two) times daily.    [DISCONTINUED] insulin aspart U-100 (NOVOLOG) 100 unit/mL (3 mL) InPn pen Inject 5 units three times a day with meal if BG > 300.    [DISCONTINUED] ipratropium (ATROVENT) 0.02 % nebulizer solution Take 2.5 mLs (500 mcg total) by nebulization 4 (four)  times daily. (Patient not taking: Reported on 2022)    [DISCONTINUED] isosorbide mononitrate (IMDUR) 30 MG 24 hr tablet Take 2 tablets (60 mg total) by mouth once daily. (Patient not taking: Reported on 2022)    [DISCONTINUED] ketoconazole (NIZORAL) 2 % cream Apply topically 2 (two) times daily.    [DISCONTINUED] levalbuterol (XOPENEX) 1.25 mg/3 mL nebulizer solution Take 3 mLs (1.25 mg total) by nebulization every 6 to 8 hours as needed for Wheezing or Shortness of Breath.    [DISCONTINUED] LIDOcaine (LIDODERM) 5 % Place 1 patch onto the skin daily as needed. 12 hours on and 12 hours off    [DISCONTINUED] metoprolol tartrate (LOPRESSOR) 25 MG tablet Take 1 tablet (25 mg total) by mouth 2 (two) times daily.    [DISCONTINUED] mycophenolate (CELLCEPT) 250 mg Cap Take 1 capsule (250 mg total) by mouth 2 (two) times daily.    [DISCONTINUED] ondansetron (ZOFRAN-ODT) 4 MG TbDL Take 1 tablet (4 mg total) by mouth every 8 (eight) hours as needed.    [DISCONTINUED] sertraline (ZOLOFT) 25 MG tablet Take 1 tablet (25 mg total) by mouth once daily. For depression and anxiety    [DISCONTINUED] sodium hypochlorite 0.125% (DAKIN'S SOLUTION) external solution Apply topically 2 (two) times daily. Left buttock ulcer     Family History       Problem Relation (Age of Onset)    Cancer Father    Diabetes Father    Heart failure Father, Mother    Stroke Father          Tobacco Use    Smoking status: Former     Packs/day: 1.00     Years: 40.00     Pack years: 40.00     Types: Cigarettes     Quit date: 2013     Years since quittin.6    Smokeless tobacco: Never   Substance and Sexual Activity    Alcohol use: Yes     Alcohol/week: 6.0 standard drinks     Types: 6 Cans of beer per week     Comment: seldom    Drug use: No    Sexual activity: Never     Review of Systems   Unable to perform ROS: Mental status change   Objective:     Vital Signs (Most Recent):  Temp: (!) 95.9 °F (35.5 °C) (22 1506)  Pulse: (!) 55 (22  1645)  Resp: 11 (08/30/22 1645)  BP: (!) 100/53 (08/30/22 1645)  SpO2: (!) 93 % (08/30/22 1645)   Vital Signs (24h Range):  Temp:  [95.9 °F (35.5 °C)] 95.9 °F (35.5 °C)  Pulse:  [55-75] 55  Resp:  [11-30] 11  SpO2:  [90 %-98 %] 93 %  BP: ()/(52-53) 100/53     Weight: 98.4 kg (216 lb 14.4 oz)  Body mass index is 30.25 kg/m².    Physical Exam  Vitals and nursing note reviewed.   Constitutional:       Appearance: He is well-developed.   HENT:      Head: Normocephalic and atraumatic.      Nose: Nose normal.   Eyes:      General: No scleral icterus.     Pupils: Pupils are equal, round, and reactive to light.   Cardiovascular:      Rate and Rhythm: Normal rate and regular rhythm.      Heart sounds: Normal heart sounds. No murmur heard.    No friction rub. No gallop.   Pulmonary:      Effort: Pulmonary effort is normal. No respiratory distress.      Breath sounds: Wheezing and rhonchi present.   Abdominal:      General: Bowel sounds are normal. There is no distension.      Palpations: Abdomen is soft.      Tenderness: There is no abdominal tenderness.   Musculoskeletal:         General: Normal range of motion.      Cervical back: Normal range of motion and neck supple.      Comments: Right BKA- incision healed   Left TMA with external fixator on left leg. Incision at TMA healed.   Pin sites for external finger all without erythema or drainage    Skin:     General: Skin is warm and dry.      Coloration: Skin is pale.      Findings: No rash.   Neurological:      Mental Status: He is alert and oriented to person, place, and time.      Cranial Nerves: No cranial nerve deficit.   Psychiatric:         Behavior: Behavior is agitated.         Cognition and Memory: Cognition is impaired.         CRANIAL NERVES     CN III, IV, VI   Pupils are equal, round, and reactive to light.     Significant Labs: All pertinent labs within the past 24 hours have been reviewed.    Significant Imaging: I have reviewed all pertinent imaging  results/findings within the past 24 hours.

## 2022-08-30 NOTE — TELEPHONE ENCOUNTER
Attempted to inform patient that appt was scheduled incorrectly and provide him with the right contact info. Voicemail full. No option to leave message.

## 2022-08-30 NOTE — ASSESSMENT & PLAN NOTE
Pt takes Oxycodone at [facility]  Narcan given PTA with good response   Also likely metabolic encephalopathy 2/2 ?infection, DINORAH/uremia, hyponatremia  Monitor

## 2022-08-30 NOTE — ASSESSMENT & PLAN NOTE
Patient is identified as Severe COVID-19 based on hypoxemia with O2 saturations <94% on room air or on ambulation   Initiate standard COVID protocols; COVID-19 testing ,Infection Control notification  and isolation- respiratory, contact and droplet per protocol    Diagnostics: (leukopenia, hyponatremia, hyperferritinemia, elevated troponin, elevated d-dimer, age, and comorbidities are significant predictors of poor clinical outcome)  CBC, CMP, Procalcitonin, Ferritin, CRP, LDH, BNP and Portable CXR    Management: Initiate targeted therapy with Remdesivir, 200mg IV x1, followed by 100mg IV daily x5 days total, Dexamethasone PO/IV 6mg daily x10 days and Anticoagulation, Patient admitted to non-critical care unit- Will initiate full dose anticoagulation with continuing Eliquis and Inhaled bronchodilators as needed for shortness of breath.

## 2022-08-30 NOTE — ASSESSMENT & PLAN NOTE
Hypothermia, tachycardia, Procal 12.6 with AMS, hypoxemia, and DINORAH  Empiric IV Cefepime  Blood cultures pending   UA pending

## 2022-08-30 NOTE — ASSESSMENT & PLAN NOTE
Patient with acute kidney injury likely due to IVVD/dehydration DINORAH is currently worsening. Labs reviewed- Renal function/electrolytes with Estimated Creatinine Clearance: 18.9 mL/min (A) (based on SCr of 4.4 mg/dL (H)). according to latest data. Monitor urine output and serial BMP and adjust therapy as needed. Avoid nephrotoxins and renally dose meds for GFR listed above.     Likely 2/2 recent reported diarrhea   IVFs  Check prograf level

## 2022-08-30 NOTE — ASSESSMENT & PLAN NOTE
Renal transplant in 2016  Consult nephrology   Cont tacrolimus-check level  Hold Cellcept due to infection

## 2022-08-30 NOTE — PHARMACY MED REC
"Admission Medication History     The home medication history was taken by Gómez Khoury.    You may go to "Admission" then "Reconcile Home Medications" tabs to review and/or act upon these items.     The home medication list has been updated by the Pharmacy department.   Please read ALL comments highlighted in yellow.   Please address this information as you see fit.    Feel free to contact us if you have any questions or require assistance.      The medications listed below were removed from the home medication list. Please reorder if appropriate:  Patient reports no longer taking the following medication(s):  ASPIRIN 81 MG EC TABLET  CLONIDINE 0.1 MG TABLET  CLOPIDOGREL 75 MG TABLET  COLLAGENASE OINTMENT  FUROSEMIDE 40 MG TABLET  HYDROCODONE-ACETAMINOPHEN  MG PER TABLET  HYDROCORTISONE 2.5 % TOPICAL CREAM  INSULIN ASPART U-100 (NOVOLOG) 100 UNIT/mL (3 mL) InPn  IPRATROPIUM 0.02 % NEBULIZER SOLUTION  ISOSORBIDE MONONITRATE 30 MG 24 HR TABLET  KETOCONAZOLE 2 % TOPICAL CREAM  LEVALBUTEROL 1.25 MG/3 mL NEBULIZER SOLUTION  LIDOCAINE 5 % TRANSDERMAL PATCH  METHOCARBAMOL 500 MG TABLET  METOPROLOL TARTRATE 25 MG TABLET  ONDANSETRON ODT 4 MG TABLET (TAKES THE TABLETS NOT ODT)  SODIUM HYPOCHLORITE 0.125 % TOPICAL SOLUTION    Medications listed below were obtained from: Analytic software- [employer], Nursing home, and SNF/Rehab/LTAC   (Not in a hospital admission)      Potential issues to be addressed PRIOR TO DISCHARGE: NONE    Gómez Khoury CPhT  Pharmacy Technician Specialist-Medication History  [facility] [phone]  Secure chat preferred     Current Outpatient Medications on File Prior to Encounter   Medication Sig Dispense Refill Last Dose    acetaminophen (TYLENOL) 500 MG tablet Take 2 tablets (1,000 mg total) by mouth 3 (three) times daily.  0 8/30/2022    amLODIPine (NORVASC) 10 MG tablet Take 10 mg by mouth once daily.   8/30/2022    apixaban (ELIQUIS) 2.5 mg Tab Take 1 tablet (2.5 mg total) by mouth 2 (two) " times daily. for 24 days 48 tablet 0 8/30/2022    atorvastatin (LIPITOR) 80 MG tablet Take 80 mg by mouth once daily.   8/29/2022    carvediloL (COREG) 12.5 MG tablet Take 1 tablet (12.5 mg total) by mouth 2 (two) times daily. 60 tablet 11 8/30/2022    ergocalciferol (ERGOCALCIFEROL) 50,000 unit Cap Take 1 capsule (50,000 Units total) by mouth every 7 days. Take on Mondays 4 capsule 11 8/29/2022    gabapentin (NEURONTIN) 300 MG capsule Take 300 mg by mouth 3 (three) times daily.   8/30/2022    linaCLOtide (LINZESS) 72 mcg Cap capsule Take 1 capsule (72 mcg total) by mouth before breakfast. 30 capsule 0 8/30/2022    mycophenolate (CELLCEPT) 250 mg Cap Take 250 mg by mouth 2 (two) times daily.   8/30/2022    oxyCODONE (ROXICODONE) 15 MG Tab Take 10 mg by mouth every 4 (four) hours as needed for Pain.   8/30/2022    semaglutide (OZEMPIC) 1 mg/dose (2 mg/1.5 mL) PnIj Inject 1 mg under the skin every 7 days. 6 Syringe 3 Past Week at Saturday, 08/27/22    sertraline (ZOLOFT) 25 MG tablet Take 25 mg by mouth once daily.   8/30/2022    tacrolimus (PROGRAF) 0.5 MG Cap Take 2 capsules (1 mg total) by mouth every 12 (twelve) hours. 180 capsule 11 8/30/2022    nitroGLYCERIN (NITROSTAT) 0.4 MG SL tablet Place 1 tablet (0.4 mg total) under the tongue every 5 (five) minutes as needed. 30 tablet 0     ondansetron (ZOFRAN) 4 MG tablet Take 4 mg by mouth every 8 (eight) hours as needed for Nausea.   Unknown    [DISCONTINUED] aspirin (ECOTRIN) 81 MG EC tablet Take 1 tablet (81 mg total) by mouth once daily. 90 tablet 1     [DISCONTINUED] cloNIDine (CATAPRES) 0.1 MG tablet Take 1 tablet (0.1 mg total) by mouth 3 (three) times daily. (Patient not taking: Reported on 8/4/2022) 30 tablet 0     [DISCONTINUED] clopidogreL (PLAVIX) 75 mg tablet Take 1 tablet (75 mg total) by mouth once daily. 30 tablet 0     [DISCONTINUED] collagenase (SANTYL) ointment Apply topically once daily. To left buttock ulcer  0     [DISCONTINUED] flash glucose  scanning reader (FREESTYLE BRYAN 14 DAY READER) Misc 1 Device by Misc.(Non-Drug; Combo Route) route as needed. 1 each 0     [DISCONTINUED] flash glucose sensor (FREESTYLE BRYAN 14 DAY SENSOR) Kit 1 kit by Misc.(Non-Drug; Combo Route) route every 14 (fourteen) days. 2 kit 11     [DISCONTINUED] furosemide (LASIX) 40 MG tablet Take 1 tablet (40 mg total) by mouth daily as needed (Leg swelling, Nocturnal SOB). 30 tablet 0     [DISCONTINUED] hydrocortisone 2.5 % cream Apply topically 2 (two) times daily. 30 g 1     [DISCONTINUED] insulin aspart U-100 (NOVOLOG) 100 unit/mL (3 mL) InPn pen Inject 5 units three times a day with meal if BG > 300. 6 mL 11     [DISCONTINUED] ipratropium (ATROVENT) 0.02 % nebulizer solution Take 2.5 mLs (500 mcg total) by nebulization 4 (four) times daily. (Patient not taking: Reported on 8/4/2022) 120 vial 11     [DISCONTINUED] isosorbide mononitrate (IMDUR) 30 MG 24 hr tablet Take 2 tablets (60 mg total) by mouth once daily. (Patient not taking: Reported on 8/4/2022) 60 tablet 11     [DISCONTINUED] ketoconazole (NIZORAL) 2 % cream Apply topically 2 (two) times daily. 60 g 1     [DISCONTINUED] levalbuterol (XOPENEX) 1.25 mg/3 mL nebulizer solution Take 3 mLs (1.25 mg total) by nebulization every 6 to 8 hours as needed for Wheezing or Shortness of Breath. 288 mL 11     [DISCONTINUED] LIDOcaine (LIDODERM) 5 % Place 1 patch onto the skin daily as needed. 12 hours on and 12 hours off       [DISCONTINUED] metoprolol tartrate (LOPRESSOR) 25 MG tablet Take 1 tablet (25 mg total) by mouth 2 (two) times daily. 180 tablet 1     [DISCONTINUED] ondansetron (ZOFRAN-ODT) 4 MG TbDL Take 1 tablet (4 mg total) by mouth every 8 (eight) hours as needed. 21 tablet 1     [DISCONTINUED] sodium hypochlorite 0.125% (DAKIN'S SOLUTION) external solution Apply topically 2 (two) times daily. Left buttock ulcer  0                        .

## 2022-08-30 NOTE — HPI
"The patient is a 66 yo with HTN, CAD, DM, PVD, kidney transplant 2016, COPD, right BKA, left transmetatarsal amputation, and s/p closed reduction left tibial and fibular shaft fractures with application of external fixation device on 8/1/22 who [facility] ED [facility] with AMS, Hypoxemia, and hypotension. AASI gave Narcan on scene with improvement in mental status and hypotension. Pt also complains of generalized generalized weakness, and diarrhea.     In the ED, Temp 95.9F, BP 90/52. WBC normal, BUN 66, Serum Cr 4.4. Bicarb 16. Procalcitonin 12.61. +COVID  CXr-showed nothing acute. UA pending     On exam, pt AAO to person, place, and date. However, he is unable to answer any of HPI or ROS questions. He only answers "I don't know" in an agitated tone.     The patient will be placed in observation under hospital medicine   "

## 2022-08-30 NOTE — ASSESSMENT & PLAN NOTE
Patient's FSGs are uncontrolled due to hyperglycemia on current medication regimen.  Last A1c reviewed-   Lab Results   Component Value Date    HGBA1C 7.4 (H) 08/04/2022     Most recent fingerstick glucose reviewed-   Recent Labs   Lab 08/30/22  1459   POCTGLUCOSE 280*     Current correctional scale  Low  Maintain anti-hyperglycemic dose as follows-   Antihyperglycemics (From admission, onward)    Start     Stop Route Frequency Ordered    08/30/22 1825  insulin aspart U-100 pen 0-5 Units  (INSULIN - LOW CORRECTION DOSE (Recommended for BMI <25, GFR<15 or on dialysis, Age > 70, type 1 diabetes, ESLD, severe CHF or patients on <70 units of insulin daily))         -- SubQ Before meals & nightly PRN 08/30/22 1726        Hold Oral hypoglycemics while patient is in the hospital.

## 2022-08-31 NOTE — ASSESSMENT & PLAN NOTE
Patient is identified as Severe COVID-19 based on hypoxemia with O2 saturations <94% on room air or on ambulation   Initiate standard COVID protocols; COVID-19 testing ,Infection Control notification  and isolation- respiratory, contact and droplet per protocol    Diagnostics: (leukopenia, hyponatremia, hyperferritinemia, elevated troponin, elevated d-dimer, age, and comorbidities are significant predictors of poor clinical outcome)  CBC, CMP, Procalcitonin, Ferritin, CRP, LDH, BNP and Portable CXR    Management: Initiate targeted therapy with Remdesivir, 200mg IV x1, followed by 100mg IV daily x5 days total, Dexamethasone PO/IV 6mg daily x10 days and Anticoagulation, Patient admitted to non-critical care unit- Will initiate full dose anticoagulation with continuing Eliquis and Inhaled bronchodilators as needed for shortness of breath.  8/31/22 The patient did have mildly worsening O2 requirements and is now requiring 5LNC. The patient did complain of midsternal chest pain today. CXR, EKG and troponin were negative. CXR did show lower lung volumes with more vascular crowding or atelectasis throughout the lungs. Encouraged incentive spirometry. Will stop fluids. Contiue current management. Will transition to inpatient.

## 2022-08-31 NOTE — TELEPHONE ENCOUNTER
----- Message from Camila Up MA sent at 8/30/2022  5:22 PM CDT -----  Regarding: FW: [facility]  Contact: Jo[facility]    ----- Message -----  From: Melba Hamilton LPN  Sent: 8/30/2022   5:20 PM CDT  To: Allen Funk Staff  Subject: FW: [facility]                                ----- Message -----  From: Grecia Alvarado  Sent: 8/30/2022   1:33 PM CDT  To: Hermann Juares Staff  Subject: [facility]                                  [facility] states that pt is going to [facility] ER for low blood pressure, bleeding from around site, poss infection at site. Please call back at [phone]//thank you acc

## 2022-08-31 NOTE — TELEPHONE ENCOUNTER
Spoke with [facility] and sent a message to Dr Mckenzie and she has been consulted.

## 2022-08-31 NOTE — PLAN OF CARE
Pt AAOx4. VSS. Pt is on 2 lpm of o2 via NC. Pt remained free of falls this shift. Bed alarm on. Pt complained of l foot pain. Medications administered as ordered. Pt received IV ABX and remdesivir. Pt is normal sinus on monitor. Hourly rounding completed. Pt instructed to call for assistance. POC reviewed. Pt verbalized understanding.

## 2022-08-31 NOTE — TELEPHONE ENCOUNTER
----- Message from Camila Up MA sent at 8/30/2022  5:22 PM CDT -----  Regarding: FW: [facility]  Contact: Jo/[facility]    ----- Message -----  From: Melba Hamilton LPN  Sent: 8/30/2022   5:20 PM CDT  To: Allen Funk Staff  Subject: FW: [facility]                                ----- Message -----  From: Grecia Alvarado  Sent: 8/30/2022   1:33 PM CDT  To: Hermann Juares Staff  Subject: [facility]                                  Jo/[facility] states that pt is going to [facility] ER for low blood pressure, bleeding from around site, poss infection at site. Please call back at [phone]//thank you acc

## 2022-08-31 NOTE — SUBJECTIVE & OBJECTIVE
Interval History: No acute events overnight. The patient did have mildly worsening O2 requirements and is now requiring 5LNC. The patient did complain of midsternal chest pain today. CXR, EKG and troponin were negative. CXR did show lower lung volumes with more vascular crowding or atelectasis throughout the lungs. Encouraged incentive spirometry. Will stop fluids. Contiue current management. Ortho consulted to evaluate LLE external fixator. Will transition to inpatient.     Review of Systems   Unable to perform ROS: Mental status change   Objective:     Vital Signs (Most Recent):  Temp: 97.5 °F (36.4 °C) (08/31/22 1618)  Pulse: 77 (08/31/22 1618)  Resp: 20 (08/31/22 1618)  BP: 129/62 (08/31/22 1618)  SpO2: (!) 92 % (08/31/22 1618)   Vital Signs (24h Range):  Temp:  [94.8 °F (34.9 °C)-97.6 °F (36.4 °C)] 97.5 °F (36.4 °C)  Pulse:  [59-83] 77  Resp:  [16-24] 20  SpO2:  [90 %-93 %] 92 %  BP: ()/(53-64) 129/62     Weight: 98.1 kg (216 lb 4.3 oz)  Body mass index is 30.16 kg/m².    Intake/Output Summary (Last 24 hours) at 8/31/2022 1726  Last data filed at 8/31/2022 1600  Gross per 24 hour   Intake 2651.25 ml   Output 600 ml   Net 2051.25 ml      Physical Exam  Vitals and nursing note reviewed.   Constitutional:       Appearance: He is well-developed.   HENT:      Head: Normocephalic and atraumatic.      Nose: Nose normal.   Eyes:      General: No scleral icterus.     Conjunctiva/sclera: Conjunctivae normal.      Pupils: Pupils are equal, round, and reactive to light.   Cardiovascular:      Rate and Rhythm: Normal rate and regular rhythm.      Heart sounds: Normal heart sounds. No murmur heard.    No friction rub. No gallop.   Pulmonary:      Effort: Pulmonary effort is normal. No respiratory distress.      Breath sounds: Wheezing and rhonchi present.   Abdominal:      General: Bowel sounds are normal. There is no distension.      Palpations: Abdomen is soft.      Tenderness: There is no abdominal tenderness.    Musculoskeletal:         General: No tenderness or deformity. Normal range of motion.      Cervical back: Normal range of motion and neck supple.      Comments: Right BKA- incision healed   Left TMA with external fixator on left leg. Incision at TMA healed.   Pin sites for external finger all without erythema or drainage    Skin:     General: Skin is warm and dry.      Coloration: Skin is pale.      Findings: No erythema or rash.   Neurological:      Mental Status: He is alert and oriented to person, place, and time.      Cranial Nerves: No cranial nerve deficit.   Psychiatric:         Behavior: Behavior is agitated.         Cognition and Memory: Cognition is impaired.       Significant Labs: All pertinent labs within the past 24 hours have been reviewed.    Significant Imaging:   Imaging Results              X-Ray Chest AP Portable (Final result)  Result time 08/30/22 15:23:13      Final result by Angie Norman MD (08/30/22 15:23:13)                   Impression:      No acute cardiopulmonary abnormality.      Electronically signed by: Angie Norman  Date:    08/30/2022  Time:    15:23               Narrative:    EXAMINATION:  XR CHEST AP PORTABLE    CLINICAL HISTORY:  Sepsis;    TECHNIQUE:  Single frontal view of the chest was performed.    COMPARISON:  Chest radiograph 08/04/2022    FINDINGS:  ECG monitoring leads overlie the chest.  A right PICC line terminates over the SVC.The lungs are clear, with normal appearance of pulmonary vasculature and no pleural effusion or pneumothorax.    The cardiac silhouette is normal in size.    Bones are intact.

## 2022-08-31 NOTE — ASSESSMENT & PLAN NOTE
External fixator in place   Continue Eliquis for DVT ppx  8/31/22 Ortho consulted to evaluate external fixator.

## 2022-08-31 NOTE — ASSESSMENT & PLAN NOTE
Patient's FSGs are uncontrolled due to hyperglycemia on current medication regimen.  Last A1c reviewed-   Lab Results   Component Value Date    HGBA1C 7.4 (H) 08/04/2022     Most recent fingerstick glucose reviewed-   Recent Labs   Lab 08/30/22  2104 08/31/22  0537 08/31/22  1132 08/31/22  1711   POCTGLUCOSE 268* 234* 220* 299*     Current correctional scale  Low  Maintain anti-hyperglycemic dose as follows-   Antihyperglycemics (From admission, onward)    Start     Stop Route Frequency Ordered    08/30/22 1825  insulin aspart U-100 pen 0-5 Units  (INSULIN - LOW CORRECTION DOSE (Recommended for BMI <25, GFR<15 or on dialysis, Age > 70, type 1 diabetes, ESLD, severe CHF or patients on <70 units of insulin daily))         -- SubQ Before meals & nightly PRN 08/30/22 1726        Hold Oral hypoglycemics while patient is in the hospital.

## 2022-08-31 NOTE — ASSESSMENT & PLAN NOTE
Pt takes Oxycodone at [facility]  Narcan given PTA with good response   Also likely metabolic encephalopathy 2/2 ?infection, DINORAH/uremia, hyponatremia  Monitor   8/31/22

## 2022-08-31 NOTE — PROGRESS NOTES
"[facility] - Telemetry ([facility])  [facility] Medicine  Progress Note    Patient Name: Lavelle Ladd  MRN: [id number]  Patient Class: IP- Inpatient   Admission Date: 8/30/2022  Length of Stay: 0 days  Attending Physician: Wili Bartlett MD  Primary Care Provider: Primary Doctor No        Subjective:     Principal Problem:Acute encephalopathy        HPI:  The patient is a 68 yo with HTN, CAD, DM, PVD, kidney transplant 2016, COPD, right BKA, left transmetatarsal amputation, and s/p closed reduction left tibial and fibular shaft fractures with application of external fixation device on 8/1/22 who [facility] with AMS, Hypoxemia, and hypotension. AASI gave Narcan on scene with improvement in mental status and hypotension. Pt also complains of generalized generalized weakness, and diarrhea.     In the ED, Temp 95.9F, BP 90/52. WBC normal, BUN 66, Serum Cr 4.4. Bicarb 16. Procalcitonin 12.61. +COVID  CXr-showed nothing acute. UA pending     On exam, pt AAO to person, place, and date. However, he is unable to answer any of HPI or ROS questions. He only answers "I don't know" in an agitated tone.     The patient will be placed in observation under hospital medicine       Overview/Hospital Course:  8/31/22 No acute events overnight. The patient did have mildly worsening O2 requirements and is now requiring 5LNC. The patient did complain of midsternal chest pain today. CXR, EKG and troponin were negative. CXR did show lower lung volumes with more vascular crowding or atelectasis throughout the lungs. Encouraged incentive spirometry. Will stop fluids. Contiue current management. Will transition to inpatient.       Interval History: No acute events overnight. The patient did have mildly worsening O2 requirements and is now requiring 5LNC. The patient did complain of midsternal chest pain today. CXR, EKG and troponin were negative. CXR did show lower lung volumes with more vascular crowding or atelectasis " throughout the lungs. Encouraged incentive spirometry. Will stop fluids. Contiue current management. Ortho consulted to evaluate LLE external fixator. Will transition to inpatient.     Review of Systems   Unable to perform ROS: Mental status change   Objective:     Vital Signs (Most Recent):  Temp: 97.5 °F (36.4 °C) (08/31/22 1618)  Pulse: 77 (08/31/22 1618)  Resp: 20 (08/31/22 1618)  BP: 129/62 (08/31/22 1618)  SpO2: (!) 92 % (08/31/22 1618)   Vital Signs (24h Range):  Temp:  [94.8 °F (34.9 °C)-97.6 °F (36.4 °C)] 97.5 °F (36.4 °C)  Pulse:  [59-83] 77  Resp:  [16-24] 20  SpO2:  [90 %-93 %] 92 %  BP: ()/(53-64) 129/62     Weight: 98.1 kg (216 lb 4.3 oz)  Body mass index is 30.16 kg/m².    Intake/Output Summary (Last 24 hours) at 8/31/2022 1726  Last data filed at 8/31/2022 1600  Gross per 24 hour   Intake 2651.25 ml   Output 600 ml   Net 2051.25 ml      Physical Exam  Vitals and nursing note reviewed.   Constitutional:       Appearance: He is well-developed.   HENT:      Head: Normocephalic and atraumatic.      Nose: Nose normal.   Eyes:      General: No scleral icterus.     Conjunctiva/sclera: Conjunctivae normal.      Pupils: Pupils are equal, round, and reactive to light.   Cardiovascular:      Rate and Rhythm: Normal rate and regular rhythm.      Heart sounds: Normal heart sounds. No murmur heard.    No friction rub. No gallop.   Pulmonary:      Effort: Pulmonary effort is normal. No respiratory distress.      Breath sounds: Wheezing and rhonchi present.   Abdominal:      General: Bowel sounds are normal. There is no distension.      Palpations: Abdomen is soft.      Tenderness: There is no abdominal tenderness.   Musculoskeletal:         General: No tenderness or deformity. Normal range of motion.      Cervical back: Normal range of motion and neck supple.      Comments: Right BKA- incision healed   Left TMA with external fixator on left leg. Incision at TMA healed.   Pin sites for external finger all  without erythema or drainage    Skin:     General: Skin is warm and dry.      Coloration: Skin is pale.      Findings: No erythema or rash.   Neurological:      Mental Status: He is alert and oriented to person, place, and time.      Cranial Nerves: No cranial nerve deficit.   Psychiatric:         Behavior: Behavior is agitated.         Cognition and Memory: Cognition is impaired.       Significant Labs: All pertinent labs within the past 24 hours have been reviewed.    Significant Imaging:   Imaging Results              X-Ray Chest AP Portable (Final result)  Result time 08/30/22 15:23:13      Final result by Anige Norman MD (08/30/22 15:23:13)                   Impression:      No acute cardiopulmonary abnormality.      Electronically signed by: Angie Norman  Date:    08/30/2022  Time:    15:23               Narrative:    EXAMINATION:  XR CHEST AP PORTABLE    CLINICAL HISTORY:  Sepsis;    TECHNIQUE:  Single frontal view of the chest was performed.    COMPARISON:  Chest radiograph 08/04/2022    FINDINGS:  ECG monitoring leads overlie the chest.  A right PICC line terminates over the SVC.The lungs are clear, with normal appearance of pulmonary vasculature and no pleural effusion or pneumothorax.    The cardiac silhouette is normal in size.    Bones are intact.                                          Assessment/Plan:      * Acute encephalopathy  Pt takes Oxycodone at [facility]  Narcan given PTA with good response   Also likely metabolic encephalopathy 2/2 ?infection, DINORAH/uremia, hyponatremia  Monitor   8/31/22     Hypothermia  - Resolved       Hyponatremia  NS iVFs  Monitor   8/31/22 Improving     Acute bronchitis due to COVID-19 virus  Patient is identified as Severe COVID-19 based on hypoxemia with O2 saturations <94% on room air or on ambulation   Initiate standard COVID protocols; COVID-19 testing ,Infection Control notification  and isolation- respiratory, contact and droplet per  protocol    Diagnostics: (leukopenia, hyponatremia, hyperferritinemia, elevated troponin, elevated d-dimer, age, and comorbidities are significant predictors of poor clinical outcome)  CBC, CMP, Procalcitonin, Ferritin, CRP, LDH, BNP and Portable CXR    Management: Initiate targeted therapy with Remdesivir, 200mg IV x1, followed by 100mg IV daily x5 days total, Dexamethasone PO/IV 6mg daily x10 days and Anticoagulation, Patient admitted to non-critical care unit- Will initiate full dose anticoagulation with continuing Eliquis and Inhaled bronchodilators as needed for shortness of breath.  8/31/22 The patient did have mildly worsening O2 requirements and is now requiring 5LNC. The patient did complain of midsternal chest pain today. CXR, EKG and troponin were negative. CXR did show lower lung volumes with more vascular crowding or atelectasis throughout the lungs. Encouraged incentive spirometry. Will stop fluids. Contiue current management. Will transition to inpatient.     SIRS (systemic inflammatory response syndrome)  Hypothermia, tachycardia, Procal 12.6 with AMS, hypoxemia, and DINORAH  Empiric IV Cefepime  Blood cultures pending   UA pending         Closed fracture of left tibia and fibula  External fixator in place   Continue Eliquis for DVT ppx  8/31/22 Ortho consulted to evaluate external fixator.     Hx of right BKA        S/P transmetatarsal amputation of foot, left  Site- healed, no breakdown  Monitor     Renal transplant, status post  Renal transplant in 2016  Consult nephrology   Cont tacrolimus-check level  Hold Cellcept due to infection       DINORAH (acute kidney injury)  Patient with acute kidney injury likely due to IVVD/dehydration DINORAH is currently worsening. Labs reviewed- Renal function/electrolytes with Estimated Creatinine Clearance: 27.7 mL/min (A) (based on SCr of 3 mg/dL (H)). according to latest data. Monitor urine output and serial BMP and adjust therapy as needed. Avoid nephrotoxins and renally  dose meds for GFR listed above.     Likely 2/2 recent reported diarrhea   IVFs  Check prograf level   8/31/22 Improving with gentle IV hydration, monitor renal function, CMP im     Coronary artery disease of native artery of native heart with stable angina pectoris  No CP  Cont ASA, Plavix, statin  Hold BB      Type 2 diabetes mellitus with hyperglycemia, with long-term current use of insulin  Patient's FSGs are uncontrolled due to hyperglycemia on current medication regimen.  Last A1c reviewed-   Lab Results   Component Value Date    HGBA1C 7.4 (H) 08/04/2022     Most recent fingerstick glucose reviewed-   Recent Labs   Lab 08/30/22  2104 08/31/22  0537 08/31/22  1132 08/31/22  1711   POCTGLUCOSE 268* 234* 220* 299*     Current correctional scale  Low  Maintain anti-hyperglycemic dose as follows-   Antihyperglycemics (From admission, onward)    Start     Stop Route Frequency Ordered    08/30/22 1825  insulin aspart U-100 pen 0-5 Units  (INSULIN - LOW CORRECTION DOSE (Recommended for BMI <25, GFR<15 or on dialysis, Age > 70, type 1 diabetes, ESLD, severe CHF or patients on <70 units of insulin daily))         -- SubQ Before meals & nightly PRN 08/30/22 1726        Hold Oral hypoglycemics while patient is in the hospital.    Essential hypertension  BP low- hold antihypertensives       Metabolic acidosis  Likely 2/2 DINORAH, dehydration   IVFs         VTE Risk Mitigation (From admission, onward)         Ordered     apixaban tablet 2.5 mg  2 times daily         08/30/22 1726                Discharge Planning   LISETH:      Code Status: Full Code   Is the patient medically ready for discharge?:     Reason for patient still in hospital (select all that apply): Patient trending condition, Laboratory test, Treatment, Imaging and Consult recommendations                     Lavelle Kamara NP  Department of Hospital Medicine   [facility] - Telemetry ([facility])

## 2022-08-31 NOTE — ASSESSMENT & PLAN NOTE
Patient with acute kidney injury likely due to IVVD/dehydration DINORAH is currently worsening. Labs reviewed- Renal function/electrolytes with Estimated Creatinine Clearance: 27.7 mL/min (A) (based on SCr of 3 mg/dL (H)). according to latest data. Monitor urine output and serial BMP and adjust therapy as needed. Avoid nephrotoxins and renally dose meds for GFR listed above.     Likely 2/2 recent reported diarrhea   IVFs  Check prograf level   8/31/22 Improving with gentle IV hydration, monitor renal function, CMP im

## 2022-08-31 NOTE — HOSPITAL COURSE
The patient is a 68 yo with HTN, CAD, DM, PVD, kidney transplant 2016, COPD, right BKA, left transmetatarsal amputation, and s/p closed reduction left tibial and fibular shaft fractures with application of external fixation device on 8/1/22 admitted for AMS, DINORAH, Acute hypoxic respiratory failure, Pneumonia due to COVID-19, Hyponatremia, and metabolic acidosis. Pt was given IV narcan PTA with +response. Hypothermic on arrival. Serum Cr 4.4. Procal 12.6.    8/31/22 No acute events overnight. The patient did have mildly worsening O2 requirements and is now requiring 5LNC. The patient did complain of midsternal chest pain today. CXR, EKG and troponin were negative. CXR did show lower lung volumes with more vascular crowding or atelectasis throughout the lungs. Encouraged incentive spirometry. Will stop fluids. Contiue current management. Will transition to inpatient.     9/1/22: +generalized body aches. AMS resolved. Pt currently on 5 liters NC.  WBC normal,afebrile, BC show NGTD. DINORAH slowly improving. Serum Cr 2.3. Bicarb 14.Ordered Bicarb drip   PT/OT- CM consulted for pt to return to [facility] when oxygenation stable and serum cr improves.   Ortho consulted for timeline with removing left LE external fixator   9/2/22 has been weaned to room air. Will need to follow up with orthopedic surgery outpatient for external fixator removal. He has been accepted to [facility]. Will plan for discharge.

## 2022-08-31 NOTE — NURSING
"Pt c/o non radiating chest pain to left chest wall. Pt describes it as "sharp pain". Pt describes CP as intermittent and lasts a second. Pt stated it started last night. VSS. Notified Lavelle Kamara NP. NP ordered EKG, troponin, nitroglycerin SL, and chest xray. Nitroglycerin will be given (see MAR). Will continue to monitor.   "

## 2022-09-01 PROBLEM — T68.XXXA HYPOTHERMIA: Status: RESOLVED | Noted: 2022-01-01 | Resolved: 2022-01-01

## 2022-09-01 PROBLEM — J96.01 ACUTE HYPOXEMIC RESPIRATORY FAILURE DUE TO COVID-19: Status: ACTIVE | Noted: 2022-01-01

## 2022-09-01 NOTE — CONSULTS
[facility] - Tele[facility] ([facility])  Orthopedics  Consult Note    Patient Name: Lavelle Ladd  MRN: [id number]  Admission Date: 2022  Hospital Length of Stay: 1 days  Attending Provider: Wili Bartlett MD  Primary Care Provider: Primary Doctor No    Patient information was obtained from patient, past medical records, ER records, and primary team.     Consults  Subjective:     Principal Problem:Acute encephalopathy    Chief Complaint:   Chief Complaint   Patient presents with    Altered Mental Status     [facility] for [facility], hypotension, hypoxia. Upon arrival of [facility], pupils pinpoint. Given Narcan. Complaining of pain all over. Ex fix to LLE from tib/fib fx. GCS 15        HPI: Lavelle Ladd is a 67-year-old male with past medical history significant for hypertension, coronary disease, diabetes, peripheral vascular disease, kidney transplant in , COPD, right below-knee amputation, left transmetatarsal amputation, and status post closed reduction of left tibial and fibular shaft fractures with external fixation still intact who was admitted following an episode of altered mental status at rehab.  He has been found to be COVID positive.  Patient is complaining of pain to the left lower extremity, but denies any events since the external fixator was placed.    Past Medical History:   Diagnosis Date    DINORAH (acute kidney injury) 2016    Arthritis     CAD in native artery 2019    CHF (congestive heart failure)     Chronic obstructive pulmonary disease 2016    Coronary artery disease involving native coronary artery of native heart without angina pectoris 2016    CRI (chronic renal insufficiency) 2019     donor kidney transplant for DM 16     Induction with Thymo x3 and IV solumedrol to total 875mg  Kidney Biopsy  2016: 16 glomeruli, ACR type 1 AVR type 2, significant microcirculatory changes, c4d negative, No DSA, 5 to10% fibrosis. Treated with thymo x8 2016- no  rejection      Diastolic heart failure     Encounter for blood transfusion     ESRD on RRT since 10/2013 10/29/2013    Biopsy proven diabetic nephropathy and lymphoplasmacytic interstitial infiltrate not c/w with AIN (ddx sjogrens or assoc with tamm-horsefall protein extravasation)     GERD (gastroesophageal reflux disease)     History of hepatitis C, s/p successful Rx w/ SVR12 - 4/2017 4/5/2017    Completed 12 weeks harvoni w/ SVR    Hyperlipidemia     Hypertension     PAD (peripheral artery disease) 7/21/2019    PIC line (peripherally inserted central catheter) flush     Prophylactic immunotherapy     Proteinuria     PVD (peripheral vascular disease) 6/26/2017    RLE BKA CT 12/11/16 Extensive atherosclerotic disease of the aorta and mesenteric arteries.     Renal hypertension     Type 2 diabetes mellitus with diabetic neuropathy, with long-term current use of insulin 12/1/2016    Vitamin B12 deficiency        Past Surgical History:   Procedure Laterality Date    AORTOGRAPHY WITH SERIALOGRAPHY N/A 6/14/2018    Procedure: LEFT LEG ANGIOGRAM;  Surgeon: Donal Mcdonald MD;  Location: [facility] CATH LAB;  Service: Vascular;  Laterality: N/A;    APPLICATION OF LARGE EXTERNAL FIXATION DEVICE TO TIBIA Left 8/4/2022    Procedure: APPLICATION, EXTERNAL FIXATION DEVICE, LARGE, TIBIA;  Surgeon: Good Villa MD;  Location: [facility] OR;  Service: Orthopedics;  Laterality: Left;    av bovine graft      Left UE    AV FISTULA PLACEMENT      left UE    CARDIAC CATHETERIZATION  02/2015    CLOSED REDUCTION OF INJURY OF TIBIA Left 8/4/2022    Procedure: CLOSED REDUCTION, TIBIA;  Surgeon: Good Villa MD;  Location: [facility] OR;  Service: Orthopedics;  Laterality: Left;  Closed reduction left tibial and fibular shaft fractures    CLOSURE OF WOUND Left 9/24/2018    Procedure: CLOSURE, WOUND;  Surgeon: Karla Wheeler DPM;  Location: [facility] OR;  Service: Podiatry;  Laterality: Left;  Secondary Wound closure, extensive    CLOSURE OF WOUND Left  11/5/2018    Procedure: CLOSURE, WOUND;  Surgeon: Karla Wheeler DPM;  Location: [facility] OR;  Service: Podiatry;  Laterality: Left;    DEBRIDEMENT OF MULTIPLE METATARSAL BONES Left 11/5/2018    Procedure: DEBRIDEMENT, METATARSAL BONE, 2 OR MORE BONES;  Surgeon: Karla Wheeler DPM;  Location: [facility] OR;  Service: Podiatry;  Laterality: Left;    EXCISION OF SKIN Left 9/27/2019    Procedure: EXCISION, SKIN;  Surgeon: Lenard Alarcon MD;  Location: [facility] 2ND FLR;  Service: Plastics;  Laterality: Left;  Plastics set, NIMS monitor, ACell    FOOT AMPUTATION THROUGH METATARSAL Left 9/21/2018    Procedure: AMPUTATION, FOOT, TRANSMETATARSAL;  Surgeon: Karla Wheeler DPM;  Location: [facility] OR;  Service: Podiatry;  Laterality: Left;    FOOT AMPUTATION THROUGH METATARSAL Left 10/31/2018    Procedure: AMPUTATION, FOOT, TRANSMETATARSAL;  Surgeon: Karla Wheeler DPM;  Location: [facility] OR;  Service: Podiatry;  Laterality: Left;    FOOT AMPUTATION THROUGH METATARSAL Left 11/5/2018    Procedure: AMPUTATION, FOOT, TRANSMETATARSAL;  Surgeon: Karla Wheeler DPM;  Location: [facility] OR;  Service: Podiatry;  Laterality: Left;  revisional transmetatarsal amputation, Left foot    IRRIGATION AND DEBRIDEMENT OF LOWER EXTREMITY Left 10/31/2018    Procedure: IRRIGATION AND DEBRIDEMENT, LOWER EXTREMITY;  Surgeon: Karla Wheeler DPM;  Location: [facility] OR;  Service: Podiatry;  Laterality: Left;    KIDNEY TRANSPLANT  05/21/2016    LEFT HEART CATHETERIZATION Left 7/21/2019    Procedure: CATHETERIZATION, HEART, LEFT;  Surgeon: Andrew Valdez MD;  Location: [facility] CATH LAB;  Service: Cardiology;  Laterality: Left;    LEG AMPUTATION THROUGH KNEE  2011    right LE, started as nail puncture leading to diabetic ulcer    SKIN FULL THICKNESS GRAFT Left 10/7/2019    Procedure: APPLICATION, GRAFT, SKIN, FULL-THICKNESS;  Surgeon: Lenard Alarcon MD;  Location: [facility] OR 2ND FLR;  Service: Plastics;  Laterality: Left;    SURGICAL REMOVAL OF LESION  OF FACE Right 10/7/2019    Procedure: EXCISION, LESION, FACE;  Surgeon: Lenard Alarcon MD;  Location: [facility] OR [address] [place] [address];  Service: Plastics;  Laterality: Right;       Review of patient's allergies indicates:  No Known Allergies    Current Facility-Administered Medications   Medication    0.9%  NaCl infusion    acetaminophen tablet 650 mg    acetaminophen tablet 650 mg    albuterol inhaler 2 puff    apixaban tablet 2.5 mg    ascorbic acid (vitamin C) tablet 500 mg    aspirin EC tablet 81 mg    atorvastatin tablet 80 mg    cefepime in dextrose 5 % 1 gram/50 mL IVPB 1 g    clopidogreL tablet 75 mg    dexAMETHasone tablet 6 mg    glucagon (human recombinant) injection 1 mg    glucose chewable tablet 16 g    glucose chewable tablet 24 g    HYDROcodone-acetaminophen 5-325 mg per tablet 1 tablet    insulin aspart U-100 pen 1-10 Units    insulin detemir U-100 pen 20 Units    multivitamin tablet    naloxone 0.4 mg/mL injection 0.4 mg    nitroGLYCERIN SL tablet 0.4 mg    ondansetron injection 4 mg    remdesivir 100 mg in sodium chloride 0.9% 250 mL infusion    sertraline tablet 25 mg    sodium bicarbonate 75 mEq in sodium chloride 0.45% 1,000 mL infusion    sodium bicarbonate tablet 650 mg    sodium chloride 0.9% flush 10 mL    tacrolimus capsule 1 mg     Family History       Problem Relation (Age of Onset)    Cancer Father    Diabetes Father    Heart failure Father, Mother    Stroke Father          Tobacco Use    Smoking status: Former     Packs/day: 1.00     Years: 40.00     Pack years: 40.00     Types: Cigarettes     Quit date: 2013     Years since quittin.6    Smokeless tobacco: Never   Substance and Sexual Activity    Alcohol use: Yes     Alcohol/week: 6.0 standard drinks     Types: 6 Cans of beer per week     Comment: seldom    Drug use: No    Sexual activity: Never     Review of Systems   Constitutional: Negative for chills, decreased appetite, fever and weight loss.   HENT:  Negative for congestion, hoarse  "voice and sore throat.    Eyes:  Negative for blurred vision, double vision, vision loss in left eye and vision loss in right eye.   Cardiovascular:  Negative for chest pain, palpitations and syncope.   Respiratory:  Negative for cough, shortness of breath and wheezing.    Endocrine: Negative for cold intolerance and heat intolerance.   Hematologic/Lymphatic: Negative for bleeding problem. Does not bruise/bleed easily.   Skin:  Negative for dry skin, flushing, itching and suspicious lesions.   Musculoskeletal:  Positive for falls, joint pain and joint swelling.   Gastrointestinal:  Negative for abdominal pain, diarrhea, nausea and vomiting.   Genitourinary:  Negative for dysuria, frequency and urgency.   Neurological:  Negative for dizziness, headaches, numbness and paresthesias.   Psychiatric/Behavioral:  Negative for altered mental status and memory loss.    Objective:     Vital Signs (Most Recent):  Temp: 98 °F (36.7 °C) (09/01/22 0750)  Pulse: 81 (09/01/22 0758)  Resp: 16 (09/01/22 0910)  BP: (!) 140/59 (09/01/22 0750)  SpO2: (!) 93 % (09/01/22 0758)   Vital Signs (24h Range):  Temp:  [97.4 °F (36.3 °C)-98 °F (36.7 °C)] 98 °F (36.7 °C)  Pulse:  [75-83] 81  Resp:  [16-20] 16  SpO2:  [92 %-98 %] 93 %  BP: (111-140)/(55-64) 140/59     Weight: 100 kg (220 lb 7.4 oz)  Height: 5' 11" (180.3 cm)  Body mass index is 30.75 kg/m².      Intake/Output Summary (Last 24 hours) at 9/1/2022 1050  Last data filed at 9/1/2022 0759  Gross per 24 hour   Intake 960 ml   Output 2300 ml   Net -1340 ml       Ortho/SPM Exam  Left lower extremity:  External fixator device is intact   Pin sites are clean and dry   Moderate edema  Calf and compartments are soft and compressible   Motor exam normal   Sensation and pulses decreased, but that is the patient's baseline     GEN: Well developed, well nourished male. AAOX3. No acute distress.   Head: Normocephalic, atraumatic.   Eyes: [place]  Neck: Trachea is midline, no adenopathy  Resp: " Breathing unlabored.  Neuro: Motor function normal, Cranial nerves intact  Psych: Mood and affect appropriate.      Significant Labs: CBC:   Recent Labs   Lab 08/30/22  1521 08/31/22  0629   WBC 10.46 7.48   HGB 11.8* 10.3*   HCT 36.7* 32.1*    185     CMP:   Recent Labs   Lab 08/30/22  1520 08/31/22  0629 09/01/22  0507   * 133* 130*   K 3.7 3.2* 4.2   CL 99 106 102   CO2 16* 15* 14*   * 207* 428*   BUN 66* 59* 52*   CREATININE 4.4* 3.0* 2.3*   CALCIUM 8.8 7.0* 8.5*   PROT 6.7 5.0* 6.1   ALBUMIN 2.3* 2.0* 2.2*   BILITOT 0.5 0.3 0.3   ALKPHOS 156* 121 142*   AST 75* 40 24   ALT 32 23 19   ANIONGAP 16 12 14     POCT Glucose:   Recent Labs   Lab 08/31/22  1711 08/31/22  2006 09/01/22  0542   POCTGLUCOSE 299* 325* 413*     Troponin:   Recent Labs   Lab 08/30/22  1520 08/31/22  1334 08/31/22  1840   TROPONINI 0.023 0.009 <0.006     All pertinent labs within the past 24 hours have been reviewed.    Significant Imaging: X-Ray: I have reviewed all pertinent results/findings and my personal findings are:  X-ray left tib-fib shows external fixator in device with more anterior angulation of the proximal portion of the tibia since the previous films    Assessment/Plan:     Active Diagnoses:    Diagnosis Date Noted POA    PRINCIPAL PROBLEM:  Acute encephalopathy [G93.40] 08/30/2022 Yes    SIRS (systemic inflammatory response syndrome) [R65.10] 08/30/2022 Yes    Acute bronchitis due to COVID-19 virus [U07.1, J20.8] 08/30/2022 Yes    Hyponatremia [E87.1] 08/30/2022 Yes    Hypothermia [T68.XXXA] 08/30/2022 Yes    Closed fracture of left tibia and fibula [S82.202A, S82.402A] 08/04/2022 Yes    Hx of right BKA [Z89.511] 11/01/2018 Not Applicable    S/P transmetatarsal amputation of foot, left [Z89.432] 10/31/2018 Not Applicable    Renal transplant, status post [Z94.0] 11/30/2016 Not Applicable    DINORAH (acute kidney injury) [N17.9] 09/25/2016 Unknown    Coronary artery disease of native artery of native heart with  stable angina pectoris [I25.118] 07/01/2016 Yes    Type 2 diabetes mellitus with hyperglycemia, with long-term current use of insulin [E11.65, Z79.4]  Not Applicable    Essential hypertension [I10] 02/20/2015 Yes    Metabolic acidosis [E87.2] 10/29/2013 Yes      Problems Resolved During this Admission:     Assessment:   67-year-old male with past medical history significant for hypertension, coronary disease, diabetes, peripheral vascular disease, kidney transplant in 2016, COPD, right below-knee amputation, left transmetatarsal amputation who is 4 weeks status post closed reduction of left tibia and fibular shaft fractures and application external fixator is admitted following an episode of altered mental status at the rehab facility     Plan:   Nonweightbearing left lower extremity   We will await medical stabilization and then plan to take the patient back to the operating room for adjustment of his external fixator device  No urgent surgical intervention indicated at this time   Daily pin care and dressing changes  We will see him outpatient to coordinate surgical intervention    Thank you for your consult. I will follow-up with patient. Please contact us if you have any additional questions.    Tam Mcdermott PA-C  Orthopedics  [facility] - Telemetry ([facility])

## 2022-09-01 NOTE — PLAN OF CARE
Problem: Adult Inpatient Plan of Care  Goal: Plan of Care Review  Outcome: Ongoing, Progressing  Goal: Patient-Specific Goal (Individualized)  Outcome: Ongoing, Progressing  Goal: Absence of Hospital-Acquired Illness or Injury  Outcome: Ongoing, Progressing  Goal: Optimal Comfort and Wellbeing  Outcome: Ongoing, Progressing  Goal: Readiness for Transition of Care  Outcome: Ongoing, Progressing     Problem: Infection  Goal: Absence of Infection Signs and Symptoms  Outcome: Ongoing, Progressing     Problem: Diabetes Comorbidity  Goal: Blood Glucose Level Within Targeted Range  Outcome: Ongoing, Progressing     Problem: Fluid and Electrolyte Imbalance (Acute Kidney Injury/Impairment)  Goal: Fluid and Electrolyte Balance  Outcome: Ongoing, Progressing     Problem: Oral Intake Inadequate (Acute Kidney Injury/Impairment)  Goal: Optimal Nutrition Intake  Outcome: Ongoing, Progressing     Problem: Renal Function Impairment (Acute Kidney Injury/Impairment)  Goal: Effective Renal Function  Outcome: Ongoing, Progressing     Problem: Fluid Imbalance (Pneumonia)  Goal: Fluid Balance  Outcome: Ongoing, Progressing     Problem: Infection (Pneumonia)  Goal: Resolution of Infection Signs and Symptoms  Outcome: Ongoing, Progressing     Problem: Respiratory Compromise (Pneumonia)  Goal: Effective Oxygenation and Ventilation  Outcome: Ongoing, Progressing     Problem: Impaired Wound Healing  Goal: Optimal Wound Healing  Outcome: Ongoing, Progressing     Problem: Skin Injury Risk Increased  Goal: Skin Health and Integrity  Outcome: Ongoing, Progressing     Problem: Fall Injury Risk  Goal: Absence of Fall and Fall-Related Injury  Outcome: Ongoing, Progressing

## 2022-09-01 NOTE — ASSESSMENT & PLAN NOTE
External fixator in place   Continue Eliquis for DVT ppx  8/31/22 Ortho consulted to evaluate external fixator.   9/1/22; Ortho consulted for timeline with removing left LE external fixator

## 2022-09-01 NOTE — SUBJECTIVE & OBJECTIVE
Interval History:+generalized body aches. AMS resolved. Pt currently on 5 liters NC.  WBC normal,afebrile, BC show NGTD. DINORAH slowly improving. Serum Cr 2.3. Bicarb 14.Ordered Bicarb drip   PT/OT- CM consulted for pt to return to [facility] when oxygenation stable and serum cr improves.     Review of Systems   Constitutional:  Positive for activity change and fatigue. Negative for appetite change, chills, diaphoresis and fever.   HENT:  Negative for congestion, nosebleeds, sore throat and trouble swallowing.    Eyes:  Negative for pain, discharge and visual disturbance.   Respiratory:  Positive for shortness of breath. Negative for apnea, cough, chest tightness, wheezing and stridor.    Cardiovascular:  Negative for chest pain, palpitations and leg swelling.   Gastrointestinal:  Negative for abdominal distention, abdominal pain, blood in stool, constipation, diarrhea, nausea and vomiting.   Endocrine: Negative for cold intolerance and heat intolerance.   Genitourinary:  Negative for difficulty urinating, dysuria, flank pain, frequency and urgency.   Musculoskeletal:  Positive for arthralgias (left ankle pain) and myalgias. Negative for back pain, joint swelling, neck pain and neck stiffness.   Skin:  Negative for rash and wound.   Allergic/Immunologic: Negative for food allergies and immunocompromised state.   Neurological:  Negative for dizziness, seizures, syncope, facial asymmetry, weakness, light-headedness and headaches.   Hematological:  Negative for adenopathy.   Psychiatric/Behavioral:  Negative for agitation, behavioral problems and confusion. The patient is not nervous/anxious.    Objective:     Vital Signs (Most Recent):  Temp: 98 °F (36.7 °C) (09/01/22 0750)  Pulse: 81 (09/01/22 0758)  Resp: 16 (09/01/22 0910)  BP: (!) 140/59 (09/01/22 0750)  SpO2: (!) 93 % (09/01/22 0758)   Vital Signs (24h Range):  Temp:  [97.4 °F (36.3 °C)-98 °F (36.7 °C)] 98 °F (36.7 °C)  Pulse:  [75-83] 81  Resp:  [16-20] 16  SpO2:   [92 %-98 %] 93 %  BP: (111-140)/(55-64) 140/59     Weight: 100 kg (220 lb 7.4 oz)  Body mass index is 30.75 kg/m².    Intake/Output Summary (Last 24 hours) at 9/1/2022 1047  Last data filed at 9/1/2022 0759  Gross per 24 hour   Intake 960 ml   Output 2300 ml   Net -1340 ml        Physical Exam  Vitals and nursing note reviewed.   Constitutional:       Appearance: He is well-developed.   HENT:      Head: Normocephalic and atraumatic.      Nose: Nose normal.   Eyes:      General: No scleral icterus.     Conjunctiva/sclera: Conjunctivae normal.      Pupils: Pupils are equal, round, and reactive to light.   Cardiovascular:      Rate and Rhythm: Normal rate and regular rhythm.      Heart sounds: Normal heart sounds. No murmur heard.    No friction rub. No gallop.   Pulmonary:      Effort: Pulmonary effort is normal. No respiratory distress.      Breath sounds: Wheezing and rhonchi present.   Abdominal:      General: Bowel sounds are normal. There is no distension.      Palpations: Abdomen is soft.      Tenderness: There is no abdominal tenderness.   Musculoskeletal:         General: No tenderness or deformity. Normal range of motion.      Cervical back: Normal range of motion and neck supple.      Comments: Right BKA- incision healed   Left TMA with external fixator on left leg. Incision at TMA healed.   Pin sites for external fixator all without erythema or drainage    Skin:     General: Skin is warm and dry.      Coloration: Skin is pale.      Findings: No erythema or rash.   Neurological:      Mental Status: He is alert and oriented to person, place, and time.      Cranial Nerves: No cranial nerve deficit.   Psychiatric:         Behavior: Behavior is agitated.         Cognition and Memory: Cognition is impaired.       Significant Labs: All pertinent labs within the past 24 hours have been reviewed.    Significant Imaging:   Imaging Results              X-Ray Chest AP Portable (Final result)  Result time 08/30/22  15:23:13      Final result by Angie Norman MD (08/30/22 15:23:13)                   Impression:      No acute cardiopulmonary abnormality.      Electronically signed by: Angie Norman  Date:    08/30/2022  Time:    15:23               Narrative:    EXAMINATION:  XR CHEST AP PORTABLE    CLINICAL HISTORY:  Sepsis;    TECHNIQUE:  Single frontal view of the chest was performed.    COMPARISON:  Chest radiograph 08/04/2022    FINDINGS:  ECG monitoring leads overlie the chest.  A right PICC line terminates over the SVC.The lungs are clear, with normal appearance of pulmonary vasculature and no pleural effusion or pneumothorax.    The cardiac silhouette is normal in size.    Bones are intact.

## 2022-09-01 NOTE — PROGRESS NOTES
Pharmacist Renal Dose Adjustment Note    Lavelle Ladd is a 67 y.o. male being treated with the medication cefepime.     Patient Data:    Vital Signs (Most Recent):  Temp: 97.9 °F (36.6 °C) (09/01/22 1100)  Pulse: 79 (09/01/22 1401)  Resp: 18 (09/01/22 1401)  BP: 118/75 (09/01/22 1100)  SpO2: (!) 93 % (09/01/22 1401)   Vital Signs (72h Range):  Temp:  [94.8 °F (34.9 °C)-98 °F (36.7 °C)]   Pulse:  [55-83]   Resp:  [11-30]   BP: ()/(52-75)   SpO2:  [90 %-98 %]      Recent Labs   Lab 08/30/22  1520 08/31/22  0629 09/01/22  0507   CREATININE 4.4* 3.0* 2.3*     Serum creatinine: 2.3 mg/dL (H) 09/01/22 0507  Estimated creatinine clearance: 36.5 mL/min (A)    Cefepime 1 g q24 h has been changed to 1 g q12 h per [facility]'s renal dose adjustment protocol.     Pharmacist's Name: Dinesh Hunt, PharmD 9/1/2022 2:20 PM  Pharmacist's Extension: [phone]

## 2022-09-01 NOTE — NURSING
POC reviewed with patient. Pt verbalized understanding  C/o pain to chest and general body aches. Moderately controlled by PRN meds and relaxation techniques.   AAO x4. VSS  NR on tele monitor.   RUE PICC line SL; labs drawn  Waffle mattress applied to bed  PICC line dressing change completed this shift  Pt on 5 L humidified NC  No other c/o at this time.  Pt remains free of injuries and falls; fall precaution in place.   Bed low, side rails x2, call light in reach, personal belongings at bedside.  Reminded to call for assistance.  Hourly rounding complete. Will continue to monitor.

## 2022-09-01 NOTE — ASSESSMENT & PLAN NOTE
Patient's FSGs are uncontrolled due to hyperglycemia on current medication regimen.  Last A1c reviewed-   Lab Results   Component Value Date    HGBA1C 7.4 (H) 08/04/2022     Most recent fingerstick glucose reviewed-   Recent Labs   Lab 08/31/22  1132 08/31/22  1711 08/31/22 2006 09/01/22  0542   POCTGLUCOSE 220* 299* 325* 413*     Current correctional scale  Low  Maintain anti-hyperglycemic dose as follows-   Antihyperglycemics (From admission, onward)    Start     Stop Route Frequency Ordered    09/01/22 1053  insulin aspart U-100 pen 1-10 Units         -- SubQ Before meals & nightly PRN 09/01/22 0954    09/01/22 1000  insulin detemir U-100 pen 20 Units         -- SubQ Daily 09/01/22 0950        9/1/22: Hyperglycemia- add Levemir and change to moderate dose NISS  Hold Oral hypoglycemics while patient is in the hospital.

## 2022-09-01 NOTE — PLAN OF CARE
Pt remained free of falls this shift. Medications administered as ordered.Hourly rounding completed. Pt instructed to call for assistance. POC reviewed. Pt verbalized understanding. Will continue to monitor.

## 2022-09-01 NOTE — ASSESSMENT & PLAN NOTE

## 2022-09-01 NOTE — CARE UPDATE
Blood sugar >500. Add IV regular insulin 10 units x one dose. Add another dose of Levemir 20units x one dose. Decrease Dexamethasone to 2mg daily. Change daily Levemir to 30units. Add Novolog 5units TID.   BP mildly elevated- will add back home meds Coreg and Amlodipine   Pt is also c/o of severe pain to left ankle. Pt was taking Oxycodone 10mg every 4 hours prn and Gabapentin at [place]. Will restart gabapentin and change Norco prn to percocet 7.5mg every 6 hours prn

## 2022-09-01 NOTE — ASSESSMENT & PLAN NOTE
Pt takes Oxycodone at [facility]  Narcan given PTA with good response   Also likely metabolic encephalopathy 2/2 ?infection, DINORAH/uremia, hyponatremia  Monitor     9/1/22: resovled

## 2022-09-01 NOTE — PLAN OF CARE
[facility] - Telemetry (Hospital)  Initial Discharge Assessment       Primary Care Provider: Primary Doctor No    Admission Diagnosis: Weakness [R53.1]  Hypoxia [R09.02]  Acute renal failure, unspecified acute renal failure type [N17.9]  COVID-19 virus infection [U07.1]    Admission Date: 8/30/2022  Expected Discharge Date:     Discharge Barriers Identified: None    Payor: HUMANA MANAGED MEDICARE / Plan: HUMANA TOTAL CARE ADVANTAGE / Product Type: Medicare Advantage /     Extended Emergency Contact Information  Primary Emergency Contact: Kecia Sagastume   [place]  Mobile Phone: [phone]  Relation: Sister    Discharge Plan A: Skilled Nursing Facility         [id number] - [place] - [address]  [address]  [place] [address]  Phone: [phone] Fax: [phone]    [facility] (New Address) - [address] AT Previously: Jyoti Huynh[address]  [address] 4th Floor [address] [place] [address]  Phone: [phone] Fax: [phone]    [facility] (Now [facility]) - [facility] [address]  [address] [address]  Phone: [phone] Fax: [phone]    [facility] - [place] - [address]  [address]  [place] [address]  Phone: [phone] Fax: [phone]      Initial Assessment (most recent)       Adult Discharge Assessment - 08/31/22 1310          Discharge Assessment    Assessment Type Discharge Planning Assessment     Confirmed/corrected address, phone number and insurance Yes     Confirmed Demographics Correct on Facesheet     Source of Information patient     Communicated LISETH with patient/caregiver Date not available/Unable to determine     Reason For Admission Acute encephalopathy     Lives With significant other     Facility Arrived From: [facility]     Do you expect to  return to your current living situation? Other (see comments)   Return to SNF    Do you have help at home or someone to help you manage your care at home? Yes     Who are your caregiver(s) and their phone number(s)? Pt's s/o and sister     Prior to hospitilization cognitive status: Alert/Oriented     Current cognitive status: Alert/Oriented     Walking or Climbing Stairs Difficulty ambulation difficulty, requires equipment     Mobility Management wheelchair     Dressing/Bathing Difficulty bathing difficulty, requires equipment     Dressing/Bathing Management shower, BSC     Equipment Currently Used at Home wheelchair;bedside commode;grab bar;shower chair     Readmission within 30 days? Yes     Patient currently being followed by outpatient case management? No     Do you currently have service(s) that help you manage your care at home? No     Do you take prescription medications? Yes     Do you have prescription coverage? Yes     Do you have any problems affording any of your prescribed medications? No     Is the patient taking medications as prescribed? yes     Who is going to help you get home at discharge? SNF facility transportation     How do you get to doctors appointments? family or friend will provide     Are you on dialysis? No     Do you take coumadin? No     Discharge Plan A Skilled Nursing Facility     DME Needed Upon Discharge  none     Discharge Plan discussed with: Patient     Discharge Barriers Identified None                   Swer spoke with patient by phone d/t COVID isolation. Patient states that he was admitted to [facility], sent to [facility] and then returned to hospital. Pt states that prior to hospitalization, patient lived at home with his girlfriend. Pt's sister visits patient's home daily to assist with needs. Patient confirmed that he did not have home O2 but has other DME at home for ambulation.    Swer inquired about if patient wanted to return to SNF. Patient states that his girlfriend has  a broken leg and his sister works during the day, so he does not have sufficient support to return home. Patient agreeable to return to [facility] when SNF auth received/medically stable. Referral sent to [facility] via [facility]. Pt tested positive for COVID at this admission,  will need to confirm that [facility] is will to accept pt with new COVID dx.     Albin advised patient how to contact NOAM. Albin to continue following to assist with discharge planning.

## 2022-09-01 NOTE — PT/OT/SLP EVAL
"Physical Therapy Evaluation    Patient Name:  Lavelle Ladd   MRN:  [id number]    Recommendations:     Discharge Recommendations:  nursing facility, skilled   Discharge Equipment Recommendations:  (TBD AT NEXT LEVEL OF CARE)   Barriers to discharge: None    Assessment:     Lavelle Ladd is a 67 y.o. male admitted with a medical diagnosis of Acute encephalopathy.  He presents with the following impairments/functional limitations:  weakness, impaired endurance, impaired functional mobility, impaired balance, pain, decreased safety awareness, decreased lower extremity function, decreased upper extremity function, decreased coordination, impaired cardiopulmonary response to activity.    Rehab Prognosis: Fair; patient would benefit from acute skilled PT services to address these deficits and reach maximum level of function.    Recent Surgery: * No surgery found *     Plan:     During this hospitalization, patient to be seen 3 x/week to address the identified rehab impairments via therapeutic exercises, therapeutic activities and progress toward the following goals:    Plan of Care Expires:  09/15/22    Subjective     Chief Complaint: PAIN "ALL OVER'  Patient/Family Comments/goals:   Pain/Comfort:  Pain Rating 1: 8/10  Location - Side 1: Left  Location - Orientation 1: generalized  Location 1: ankle  Pain Addressed 1: Reposition, Distraction  Pain Rating Post-Intervention 1: 8/10 (PT NON-COMPLIANT WITH NWB TO LLE)    Patients cultural, spiritual, Scientologist conflicts given the current situation:      Living Environment:  PT LIVES WITH GIRLFRIEND 1 STORY HOUSE WITH RAMP TO ENTER, PT WAS AT [facility] WORKING ON BED MOBILITY ONLY PER PT  Prior to admission, patients level of function was .  Equipment used at home: bedside commode, wheelchair, grab bar, shower chair, cane, quad.  DME owned (not currently used): none.  Upon discharge, patient will have assistance from ?.    Objective:     Communicated with NURSE prior to " "session.  Patient found supine with external fixator, telemetry, peripheral IV, oxygen, bed alarm, PICC line  upon PT entry to room.    General Precautions: Standard, airborne, contact, droplet, fall, respiratory   Orthopedic Precautions:LLE non weight bearing   Braces: N/A  Respiratory Status: Nasal cannula, flow 3 L/min    Exams:  Cognitive Exam:  Patient is oriented to Person, Place, Time, and Situation  Postural Exam:  Patient presented with the following abnormalities:    -       Rounded shoulders  -       Forward head  Sensation:    -       Impaired  light/touch LLE FROM FOOT TO KNEE  Skin Integrity/Edema:      -       Edema: Mild LLE  RLE ROM: WFL  RLE Strength: GROSSLY 3+/5  LLE ROM: LIMITED  LLE Strength: NT    Functional Mobility:  Bed Mobility:     Rolling Left:  minimum assistance  Rolling Right: minimum assistance  Scooting: minimum assistance  Bridging: minimum assistance, NON-COMPLIANT WITH NWB TO LLE DURING BRIDGING TO MOVE HIGHER IN BED  Supine to Sit: minimum assistance  Sit to Supine: minimum assistance  Balance: POOR+ SITTING BALANCE AT EOB    Therapeutic Activities and Exercises:   PT EDUCATED IN ROLE OF P.T. AND POC IN ACUTE CARE HOSPITAL SETTING, REVIEW NWB FOR LLE, EDUCATED IN AND ENCOURAGED TO PERFORM BLE THEREX WHILE SEATED OR SUPINE THROUGHOUT THE DAY TO TOLERANCE: HIP FLEX/EXT, HIP ABD/ADD, QUAD SET, LAQ, HEEL SLIDE.  PT AGREEABLE TO REQUESTS  PT EDUCATED ON RISK FOR FALLS DUE TO GENERALIZED WEAKNESS, EDUCATED ON "CALL DON'T FALL", ENCOURAGED TO CALL FOR ASSISTANCE WITH ALL NEEDS, PT AGREEABLE TO SAFETY PRECAUTIONS    AM-PAC 6 CLICK MOBILITY  Total Score:10     Patient left HOB elevated with all lines intact, call button in reach, bed alarm on, and NURSE notified.    GOALS:   Multidisciplinary Problems       Physical Therapy Goals          Problem: Physical Therapy    Goal Priority Disciplines Outcome Goal Variances Interventions   Physical Therapy Goal     PT, PT/OT      Description: " LTG'S TO BE MET IN 14 DAYS (-15-)  PT WILL REQUIRE [facility] FOR BED MOBILITY  PT WILL REQUIRE [facility] FOR SEATED POSTERIOR SCOOT FROM BED>CHAIR                            History:     Past Medical History:   Diagnosis Date    DINORAH (acute kidney injury) 2016    Arthritis     CAD in native artery 2019    CHF (congestive heart failure)     Chronic obstructive pulmonary disease 2016    Coronary artery disease involving native coronary artery of native heart without angina pectoris 2016    CRI (chronic renal insufficiency) 2019     donor kidney transplant for DM 16     Induction with Thymo x3 and IV solumedrol to total 875mg  Kidney Biopsy  2016: 16 glomeruli, ACR type 1 AVR type 2, significant microcirculatory changes, c4d negative, No DSA, 5 to10% fibrosis. Treated with thymo x8 2016- no rejection      Diastolic heart failure     Encounter for blood transfusion     ESRD on RRT since 10/2013 10/29/2013    Biopsy proven diabetic nephropathy and lymphoplasmacytic interstitial infiltrate not c/w with AIN (ddx sjogrens or assoc with tamm-horsefall protein extravasation)     GERD (gastroesophageal reflux disease)     History of hepatitis C, s/p successful Rx w/ SVR12 - 2017    Completed 12 weeks harvoni w/ SVR    Hyperlipidemia     Hypertension     PAD (peripheral artery disease) 2019    PIC line (peripherally inserted central catheter) flush     Prophylactic immunotherapy     Proteinuria     PVD (peripheral vascular disease) 2017    RLE BKA CT 16 Extensive atherosclerotic disease of the aorta and mesenteric arteries.     Renal hypertension     Type 2 diabetes mellitus with diabetic neuropathy, with long-term current use of insulin 2016    Vitamin B12 deficiency        Past Surgical History:   Procedure Laterality Date    AORTOGRAPHY WITH SERIALOGRAPHY N/A 2018    Procedure: LEFT LEG ANGIOGRAM;  Surgeon: Donal Mcdonald MD;  Location: [facility] CATH LAB;   Service: Vascular;  Laterality: N/A;    APPLICATION OF LARGE EXTERNAL FIXATION DEVICE TO TIBIA Left 8/4/2022    Procedure: APPLICATION, EXTERNAL FIXATION DEVICE, LARGE, TIBIA;  Surgeon: Good Villa MD;  Location: [facility] OR;  Service: Orthopedics;  Laterality: Left;    av bovine graft      Left UE    AV FISTULA PLACEMENT      left UE    CARDIAC CATHETERIZATION  02/2015    CLOSED REDUCTION OF INJURY OF TIBIA Left 8/4/2022    Procedure: CLOSED REDUCTION, TIBIA;  Surgeon: Good Villa MD;  Location: [facility] OR;  Service: Orthopedics;  Laterality: Left;  Closed reduction left tibial and fibular shaft fractures    CLOSURE OF WOUND Left 9/24/2018    Procedure: CLOSURE, WOUND;  Surgeon: Karla Wheeler DPM;  Location: [facility] OR;  Service: Podiatry;  Laterality: Left;  Secondary Wound closure, extensive    CLOSURE OF WOUND Left 11/5/2018    Procedure: CLOSURE, WOUND;  Surgeon: Karla Wheeler DPM;  Location: [facility] OR;  Service: Podiatry;  Laterality: Left;    DEBRIDEMENT OF MULTIPLE METATARSAL BONES Left 11/5/2018    Procedure: DEBRIDEMENT, METATARSAL BONE, 2 OR MORE BONES;  Surgeon: Karla Wheeler DPM;  Location: [facility] OR;  Service: Podiatry;  Laterality: Left;    EXCISION OF SKIN Left 9/27/2019    Procedure: EXCISION, SKIN;  Surgeon: Lenard Alarcon MD;  Location: [address];  Service: Plastics;  Laterality: Left;  Plastics set, NIMS monitor, [employer]Cell    FOOT AMPUTATION THROUGH METATARSAL Left 9/21/2018    Procedure: AMPUTATION, FOOT, TRANSMETATARSAL;  Surgeon: Karla Wheeler DPM;  Location: [facility] OR;  Service: Podiatry;  Laterality: Left;    FOOT AMPUTATION THROUGH METATARSAL Left 10/31/2018    Procedure: AMPUTATION, FOOT, TRANSMETATARSAL;  Surgeon: Karla Wheeler DPM;  Location: [facility] OR;  Service: Podiatry;  Laterality: Left;    FOOT AMPUTATION THROUGH METATARSAL Left 11/5/2018    Procedure: AMPUTATION, FOOT, TRANSMETATARSAL;  Surgeon: Karla Wheeler DPM;  Location: [facility] OR;  Service:  Podiatry;  Laterality: Left;  revisional transmetatarsal amputation, Left foot    IRRIGATION AND DEBRIDEMENT OF LOWER EXTREMITY Left 10/31/2018    Procedure: IRRIGATION AND DEBRIDEMENT, LOWER EXTREMITY;  Surgeon: Karla Wheeler DPM;  Location: [facility] OR;  Service: Podiatry;  Laterality: Left;    KIDNEY TRANSPLANT  05/21/2016    LEFT HEART CATHETERIZATION Left 7/21/2019    Procedure: CATHETERIZATION, HEART, LEFT;  Surgeon: Andrew Valdez MD;  Location: [facility] CATH LAB;  Service: Cardiology;  Laterality: Left;    LEG AMPUTATION THROUGH KNEE  2011    right LE, started as nail puncture leading to diabetic ulcer    SKIN FULL THICKNESS GRAFT Left 10/7/2019    Procedure: APPLICATION, GRAFT, SKIN, FULL-THICKNESS;  Surgeon: Lenard Alarcon MD;  Location: [address];  Service: Plastics;  Laterality: Left;    SURGICAL REMOVAL OF LESION OF FACE Right 10/7/2019    Procedure: EXCISION, LESION, FACE;  Surgeon: Lenard Alarcon MD;  Location: [address];  Service: Plastics;  Laterality: Right;       Time Tracking:     PT Received On: 09/01/22  PT Start Time: 1050     PT Stop Time: 1113  PT Total Time (min): 23 min     Billable Minutes: Evaluation 15 and Therapeutic Activity 8    09/01/2022

## 2022-09-01 NOTE — PROGRESS NOTES
"[facility] - Telemetry ([facility])  [facility] Medicine  Progress Note    Patient Name: Lavelle Ladd  MRN: [id number]  Patient Class: IP- Inpatient   Admission Date: 8/30/2022  Length of Stay: 1 days  Attending Physician: Wili Bartlett MD  Primary Care Provider: Primary Doctor No        Subjective:     Principal Problem:Acute encephalopathy        HPI:  The patient is a 66 yo with HTN, CAD, DM, PVD, kidney transplant 2016, COPD, right BKA, left transmetatarsal amputation, and s/p closed reduction left tibial and fibular shaft fractures with application of external fixation device on 8/1/22 [facility] with [facility], Hypoxemia, and hypotension. AASI gave Narcan on scene with improvement in mental status and hypotension. Pt also complains of generalized generalized weakness, and diarrhea.     In the ED, Temp 95.9F, BP 90/52. WBC normal, BUN 66, Serum Cr 4.4. Bicarb 16. Procalcitonin 12.61. +COVID  CXr-showed nothing acute. UA pending     On exam, pt AAO to person, place, and date. However, he is unable to answer any of HPI or ROS questions. He only answers "I don't know" in an agitated tone.     The patient will be placed in observation under hospital medicine       Overview/Hospital Course:  The patient is a 66 yo with HTN, CAD, DM, PVD, kidney transplant 2016, COPD, right BKA, left transmetatarsal amputation, and s/p closed reduction left tibial and fibular shaft fractures with application of external fixation device on 8/1/22 admitted for AMS, DINORAH, Acute hypoxic respiratory failure, Pneumonia due to COVID-19, Hyponatremia, and metabolic acidosis. Pt was given IV narcan PTA with +response. Hypothermic on arrival. Serum Cr 4.4. Procal 12.6.    8/31/22 No acute events overnight. The patient did have mildly worsening O2 requirements and is now requiring 5LNC. The patient did complain of midsternal chest pain today. CXR, EKG and troponin were negative. CXR did show lower lung volumes with more vascular " crowding or atelectasis throughout the lungs. Encouraged incentive spirometry. Will stop fluids. Contiue current management. Will transition to inpatient.     9/1/22: +generalized body aches. AMS resolved. Pt currently on 5 liters NC.  WBC normal,afebrile, BC show NGTD.Cont empiric IV cefepime and repeat procalcitonin   DINORAH slowly improving. Serum Cr 2.3. Bicarb 14.Ordered Bicarb drip   PT/OT- CM consulted for pt to return to [facility] when oxygenation stable and serum cr improves.   Ortho consulted for timeline with removing left LE external fixator       Interval History:+generalized body aches. AMS resolved. Pt currently on 5 liters NC.  WBC normal,afebrile, BC show NGTD. DINORAH slowly improving. Serum Cr 2.3. Bicarb 14.Ordered Bicarb drip   PT/OT- CM consulted for pt to return to [facility] when oxygenation stable and serum cr improves.     Review of Systems   Constitutional:  Positive for activity change and fatigue. Negative for appetite change, chills, diaphoresis and fever.   HENT:  Negative for congestion, nosebleeds, sore throat and trouble swallowing.    Eyes:  Negative for pain, discharge and visual disturbance.   Respiratory:  Positive for shortness of breath. Negative for apnea, cough, chest tightness, wheezing and stridor.    Cardiovascular:  Negative for chest pain, palpitations and leg swelling.   Gastrointestinal:  Negative for abdominal distention, abdominal pain, blood in stool, constipation, diarrhea, nausea and vomiting.   Endocrine: Negative for cold intolerance and heat intolerance.   Genitourinary:  Negative for difficulty urinating, dysuria, flank pain, frequency and urgency.   Musculoskeletal:  Positive for arthralgias (left ankle pain) and myalgias. Negative for back pain, joint swelling, neck pain and neck stiffness.   Skin:  Negative for rash and wound.   Allergic/Immunologic: Negative for food allergies and immunocompromised state.   Neurological:  Negative for dizziness, seizures, syncope,  facial asymmetry, weakness, light-headedness and headaches.   Hematological:  Negative for adenopathy.   Psychiatric/Behavioral:  Negative for agitation, behavioral problems and confusion. The patient is not nervous/anxious.    Objective:     Vital Signs (Most Recent):  Temp: 98 °F (36.7 °C) (09/01/22 0750)  Pulse: 81 (09/01/22 0758)  Resp: 16 (09/01/22 0910)  BP: (!) 140/59 (09/01/22 0750)  SpO2: (!) 93 % (09/01/22 0758)   Vital Signs (24h Range):  Temp:  [97.4 °F (36.3 °C)-98 °F (36.7 °C)] 98 °F (36.7 °C)  Pulse:  [75-83] 81  Resp:  [16-20] 16  SpO2:  [92 %-98 %] 93 %  BP: (111-140)/(55-64) 140/59     Weight: 100 kg (220 lb 7.4 oz)  Body mass index is 30.75 kg/m².    Intake/Output Summary (Last 24 hours) at 9/1/2022 1047  Last data filed at 9/1/2022 0759  Gross per 24 hour   Intake 960 ml   Output 2300 ml   Net -1340 ml        Physical Exam  Vitals and nursing note reviewed.   Constitutional:       Appearance: He is well-developed.   HENT:      Head: Normocephalic and atraumatic.      Nose: Nose normal.   Eyes:      General: No scleral icterus.     Conjunctiva/sclera: Conjunctivae normal.      Pupils: Pupils are equal, round, and reactive to light.   Cardiovascular:      Rate and Rhythm: Normal rate and regular rhythm.      Heart sounds: Normal heart sounds. No murmur heard.    No friction rub. No gallop.   Pulmonary:      Effort: Pulmonary effort is normal. No respiratory distress.      Breath sounds: Wheezing and rhonchi present.   Abdominal:      General: Bowel sounds are normal. There is no distension.      Palpations: Abdomen is soft.      Tenderness: There is no abdominal tenderness.   Musculoskeletal:         General: No tenderness or deformity. Normal range of motion.      Cervical back: Normal range of motion and neck supple.      Comments: Right BKA- incision healed   Left TMA with external fixator on left leg. Incision at TMA healed.   Pin sites for external fixator all without erythema or drainage     Skin:     General: Skin is warm and dry.      Coloration: Skin is pale.      Findings: No erythema or rash.   Neurological:      Mental Status: He is alert and oriented to person, place, and time.      Cranial Nerves: No cranial nerve deficit.   Psychiatric:         Behavior: Behavior is agitated.         Cognition and Memory: Cognition is impaired.       Significant Labs: All pertinent labs within the past 24 hours have been reviewed.    Significant Imaging:   Imaging Results              X-Ray Chest AP Portable (Final result)  Result time 08/30/22 15:23:13      Final result by Angie Norman MD (08/30/22 15:23:13)                   Impression:      No acute cardiopulmonary abnormality.      Electronically signed by: Angie Norman  Date:    08/30/2022  Time:    15:23               Narrative:    EXAMINATION:  XR CHEST AP PORTABLE    CLINICAL HISTORY:  Sepsis;    TECHNIQUE:  Single frontal view of the chest was performed.    COMPARISON:  Chest radiograph 08/04/2022    FINDINGS:  ECG monitoring leads overlie the chest.  A right PICC line terminates over the SVC.The lungs are clear, with normal appearance of pulmonary vasculature and no pleural effusion or pneumothorax.    The cardiac silhouette is normal in size.    Bones are intact.                                          Assessment/Plan:      * Acute encephalopathy  Pt takes Oxycodone at [facility]  Narcan given PTA with good response   Also likely metabolic encephalopathy 2/2 ?infection, DINORAH/uremia, hyponatremia  Monitor     9/1/22: resovled    DINORAH (acute kidney injury)  Patient with acute kidney injury likely due to IVVD/dehydration DINORAH is currently worsening. Labs reviewed- Renal function/electrolytes with Estimated Creatinine Clearance: 36.5 mL/min (A) (based on SCr of 2.3 mg/dL (H)). according to latest data. Monitor urine output and serial BMP and adjust therapy as needed. Avoid nephrotoxins and renally dose meds for GFR listed above.     Likely 2/2  recent reported diarrhea   IVFs  Check prograf level   8/31/22 Improving with gentle IV hydration, monitor renal function, CMP am  9/1/22: Continues to improve- change to bicarb drip    Acute hypoxemic respiratory failure and bilateral pneumonia due to COVID-19  Patient is identified as Severe COVID-19 based on hypoxemia with O2 saturations <94% on room air or on ambulation   Initiate standard COVID protocols; COVID-19 testing ,Infection Control notification  and isolation- respiratory, contact and droplet per protocol    Diagnostics: (leukopenia, hyponatremia, hyperferritinemia, elevated troponin, elevated d-dimer, age, and comorbidities are significant predictors of poor clinical outcome)  CBC, CMP, Procalcitonin, Ferritin, CRP, LDH, BNP and Portable CXR    Management: Initiate targeted therapy with Remdesivir, 200mg IV x1, followed by 100mg IV daily x5 days total, Dexamethasone PO/IV 6mg daily x10 days and Anticoagulation, Patient admitted to non-critical care unit- Will initiate full dose anticoagulation with continuing Eliquis and Inhaled bronchodilators as needed for shortness of breath.  8/31/22 The patient did have mildly worsening O2 requirements and is now requiring 5LNC. The patient did complain of midsternal chest pain today. CXR, EKG and troponin were negative. CXR did show lower lung volumes with more vascular crowding or atelectasis throughout the lungs. Encouraged incentive spirometry. Will stop fluids. Contiue current management. Will transition to inpatient.   9/1/22: respiratory status stable on 5 liters. Cont empiric IV cefepime and repeat procalcitonin     Hyponatremia  NS iVFs  Monitor       Metabolic acidosis  Likely 2/2 DINORAH, dehydration   IVFs     9/1/22: worsening - bicarb drip      Type 2 diabetes mellitus with hyperglycemia, with long-term current use of insulin  Patient's FSGs are uncontrolled due to hyperglycemia on current medication regimen.  Last A1c reviewed-   Lab Results    Component Value Date    HGBA1C 7.4 (H) 08/04/2022     Most recent fingerstick glucose reviewed-   Recent Labs   Lab 08/31/22  1132 08/31/22  1711 08/31/22 2006 09/01/22  0542   POCTGLUCOSE 220* 299* 325* 413*     Current correctional scale  Low  Maintain anti-hyperglycemic dose as follows-   Antihyperglycemics (From admission, onward)    Start     Stop Route Frequency Ordered    09/01/22 1053  insulin aspart U-100 pen 1-10 Units         -- SubQ Before meals & nightly PRN 09/01/22 0954    09/01/22 1000  insulin detemir U-100 pen 20 Units         -- SubQ Daily 09/01/22 0950        9/1/22: Hyperglycemia- add Levemir and change to moderate dose NISS  Hold Oral hypoglycemics while patient is in the hospital.    Closed fracture of left tibia and fibula  External fixator in place   Continue Eliquis for DVT ppx  8/31/22 Ortho consulted to evaluate external fixator.   9/1/22; Ortho consulted for timeline with removing left LE external fixator     Hx of right BKA        S/P transmetatarsal amputation of foot, left  Site- healed, no breakdown  Monitor     Renal transplant, status post  Renal transplant in 2016  Consult nephrology   Cont tacrolimus-check level  Hold Cellcept due to infection       Coronary artery disease of native artery of native heart with stable angina pectoris  No CP  Cont ASA, Plavix, statin  Hold BB      Essential hypertension  BP low- hold antihypertensives         VTE Risk Mitigation (From admission, onward)         Ordered     apixaban tablet 2.5 mg  2 times daily         08/30/22 1726                Discharge Planning   LISETH:      Code Status: Full Code   Is the patient medically ready for discharge?:     Reason for patient still in hospital (select all that apply): Patient trending condition  Discharge Plan A: Skilled Nursing Facility                  Ashley Tavarez NP  Department of Hospital Medicine   [facility] - Telemetry ([facility])

## 2022-09-01 NOTE — PT/OT/SLP EVAL
"Occupational Therapy   Evaluation    Name: Lavelle Ladd  MRN: [id number]  Admitting Diagnosis:  Acute encephalopathy  Recent Surgery: * No surgery found *      Recommendations:     Discharge Recommendations: nursing facility, skilled  Discharge Equipment Recommendations:   (TBD)  Barriers to discharge:  None    Assessment:     Lavelle Ladd is a 67 y.o. male with a medical diagnosis of Acute encephalopathy.  He presents with the following performance deficits affecting function: weakness, impaired endurance, impaired sensation, impaired self care skills, impaired functional mobility, impaired balance, pain, impaired cardiopulmonary response to activity, decreased safety awareness, orthopedic precautions.      Rehab Prognosis: Fair; patient would benefit from acute skilled OT services to address these deficits and reach maximum level of function.       Plan:     Patient to be seen 2 x/week to address the above listed problems via self-care/home management, therapeutic activities, therapeutic exercises  Plan of Care Expires: 09/15/22  Plan of Care Reviewed with: patient    Subjective     Chief Complaint: Reported "Everything hurts at this point."  Patient/Family Comments/goals: get stronger    Occupational Profile:  Living Environment: Patient typically resides with his significant other in a 1 story home with a ramp to enter. Presents to acute from SNF.  Previous level of function: Patient does not use prosthesis at baseline, transfer to w/c mod I. Mod I with ADLs. Reports significant immobility at SNF.  Roles and Routines: n/a  Equipment Used at Home:  wheelchair, bedside commode, grab bar, shower chair, R LE prosthesis  Assistance upon Discharge: significant other    Pain/Comfort:  Pain Rating 1: 8/10  Location - Orientation 1: generalized  Pain Addressed 1:  (activity pacing)  Pain Rating Post-Intervention 1: 8/10 (nurse aware)    Objective:     Communicated with: NurseBetzy, prior to session.  Patient found " supine with external fixator, telemetry, peripheral IV, oxygen upon OT entry to room.    General Precautions: Standard, airborne, contact, droplet, fall   Orthopedic Precautions:LLE non weight bearing   Braces: N/A  Respiratory Status: Nasal cannula, flow 5 L/min    Bed Mobility:    Patient completed Supine to Sit with minimum assistance and with side rail  Patient completed Sit to Supine with contact guard assistance and with side rail  Unable to complete seated scooting  Supine scooting with poor WB compliance.    Functional Mobility/Transfers:  Unable to complete. NWB to L LE and no prosthesis for R LE.    Activities of Daily Living:  Grooming: minimum assistance .  Upper Body Dressing: moderate assistance .    Cognitive/Visual Perceptual:  Cognitive/Psychosocial Skills:     -       Oriented to: Person, Place, Time, and Situation   -       Follows Commands/attention:Follows one-step commands    Physical Exam:  Balance:    -       static sitting: fair, dynamic sitting: poor +  Upper Extremity Range of Motion:     -       Right Upper Extremity: WFL  -       Left Upper Extremity: WFL  Upper Extremity Strength:    -       Right Upper Extremity: Deficits: grossly 4-/5  -       Left Upper Extremity: Deficits: grossly 4-/5   Strength:    -       Right Upper Extremity: Deficits: fair  -       Left Upper Extremity: Deficits: fair    AMPAC 6 Click ADL:  AMPAC Total Score: 16    Treatment & Education:  Patient educated on role of OT in acute setting and benefits of participation. Educated on WB precaution and it's functional implications. Educated on techniques to use to increase independence and decrease fall risk with functional transfers. Educated on importance of EOB activity and rationale for completion Encouraged completion of B UE AROM therex throughout the day to tolerance to increase functional strength and activity tolerance. Patient stated understanding and in agreement with POC.    Patient left HOB elevated  with all lines intact, call button in reach, and bed alarm on    GOALS:   Multidisciplinary Problems       Occupational Therapy Goals          Problem: Occupational Therapy    Goal Priority Disciplines Outcome Interventions   Occupational Therapy Goal     OT, PT/OT     Description: Goals to be met by: 9/15/22     Patient will increase functional independence with ADLs by performing:    UE Dressing with Set-up Assistance.  Supine to sit with Modified [place].  Increased functional strength in B UE grossly by 1/2 MM grade.                       History:     Past Medical History:   Diagnosis Date    DINORAH (acute kidney injury) 2016    Arthritis     CAD in native artery 2019    CHF (congestive heart failure)     Chronic obstructive pulmonary disease 2016    Coronary artery disease involving native coronary artery of native heart without angina pectoris 2016    CRI (chronic renal insufficiency) 2019     donor kidney transplant for DM 16     Induction with Thymo x3 and IV solumedrol to total 875mg  Kidney Biopsy  2016: 16 glomeruli, ACR type 1 AVR type 2, significant microcirculatory changes, c4d negative, No DSA, 5 to10% fibrosis. Treated with thymo x8 2016- no rejection      Diastolic heart failure     Encounter for blood transfusion     ESRD on RRT since 10/2013 10/29/2013    Biopsy proven diabetic nephropathy and lymphoplasmacytic interstitial infiltrate not c/w with AIN (ddx sjogrens or assoc with tamm-horsefall protein extravasation)     GERD (gastroesophageal reflux disease)     History of hepatitis C, s/p successful Rx w/ SVR12 - 2017    Completed 12 weeks harvoni w/ SVR    Hyperlipidemia     Hypertension     PAD (peripheral artery disease) 2019    PIC line (peripherally inserted central catheter) flush     Prophylactic immunotherapy     Proteinuria     PVD (peripheral vascular disease) 2017    RLE BKA CT 16 Extensive atherosclerotic  disease of the aorta and mesenteric arteries.     Renal hypertension     Type 2 diabetes mellitus with diabetic neuropathy, with long-term current use of insulin 12/1/2016    Vitamin B12 deficiency          Past Surgical History:   Procedure Laterality Date    AORTOGRAPHY WITH SERIALOGRAPHY N/A 6/14/2018    Procedure: LEFT LEG ANGIOGRAM;  Surgeon: Donal Mcdonald MD;  Location: [facility] CATH LAB;  Service: Vascular;  Laterality: N/A;    APPLICATION OF LARGE EXTERNAL FIXATION DEVICE TO TIBIA Left 8/4/2022    Procedure: APPLICATION, EXTERNAL FIXATION DEVICE, LARGE, TIBIA;  Surgeon: Good Villa MD;  Location: [facility] OR;  Service: Orthopedics;  Laterality: Left;    av bovine graft      Left UE    AV FISTULA PLACEMENT      left UE    CARDIAC CATHETERIZATION  02/2015    CLOSED REDUCTION OF INJURY OF TIBIA Left 8/4/2022    Procedure: CLOSED REDUCTION, TIBIA;  Surgeon: Good Villa MD;  Location: [facility] OR;  Service: Orthopedics;  Laterality: Left;  Closed reduction left tibial and fibular shaft fractures    CLOSURE OF WOUND Left 9/24/2018    Procedure: CLOSURE, WOUND;  Surgeon: Karla Wheeler DPM;  Location: [facility] OR;  Service: Podiatry;  Laterality: Left;  Secondary Wound closure, extensive    CLOSURE OF WOUND Left 11/5/2018    Procedure: CLOSURE, WOUND;  Surgeon: Karla Wheeler DPM;  Location: [facility] OR;  Service: Podiatry;  Laterality: Left;    DEBRIDEMENT OF MULTIPLE METATARSAL BONES Left 11/5/2018    Procedure: DEBRIDEMENT, METATARSAL BONE, 2 OR MORE BONES;  Surgeon: Karla Wheeler DPM;  Location: [facility] OR;  Service: Podiatry;  Laterality: Left;    EXCISION OF SKIN Left 9/27/2019    Procedure: EXCISION, SKIN;  Surgeon: Lenard Alarcon MD;  [address]  Service: Plastics;  Laterality: Left;  Plastics set, NIMS monitor, [employer]    FOOT AMPUTATION THROUGH METATARSAL Left 9/21/2018    Procedure: AMPUTATION, FOOT, TRANSMETATARSAL;  Surgeon: Karla Wheeler DPM;  Location: [facility] OR;  Service:  Podiatry;  Laterality: Left;    FOOT AMPUTATION THROUGH METATARSAL Left 10/31/2018    Procedure: AMPUTATION, FOOT, TRANSMETATARSAL;  Surgeon: Karla Wheeler DPM;  Location: [facility] OR;  Service: Podiatry;  Laterality: Left;    FOOT AMPUTATION THROUGH METATARSAL Left 11/5/2018    Procedure: AMPUTATION, FOOT, TRANSMETATARSAL;  Surgeon: Karla Wheeler DPM;  Location: [facility] OR;  Service: Podiatry;  Laterality: Left;  revisional transmetatarsal amputation, Left foot    IRRIGATION AND DEBRIDEMENT OF LOWER EXTREMITY Left 10/31/2018    Procedure: IRRIGATION AND DEBRIDEMENT, LOWER EXTREMITY;  Surgeon: Karla Wheeler DPM;  Location: [facility] OR;  Service: Podiatry;  Laterality: Left;    KIDNEY TRANSPLANT  05/21/2016    LEFT HEART CATHETERIZATION Left 7/21/2019    Procedure: CATHETERIZATION, HEART, LEFT;  Surgeon: Andrew Valdez MD;  Location: [facility] CATH LAB;  Service: Cardiology;  Laterality: Left;    LEG AMPUTATION THROUGH KNEE  2011    right LE, started as nail puncture leading to diabetic ulcer    SKIN FULL THICKNESS GRAFT Left 10/7/2019    Procedure: APPLICATION, GRAFT, SKIN, FULL-THICKNESS;  Surgeon: Lenard Alarcon MD;  [facility];  Service: Plastics;  Laterality: Left;    SURGICAL REMOVAL OF LESION OF FACE Right 10/7/2019    Procedure: EXCISION, LESION, FACE;  Surgeon: Lenard Alarcon MD;  Location: [facility] OR [facility];  Service: Plastics;  Laterality: Right;       Time Tracking:     OT Date of Treatment: 09/01/22  OT Start Time: 1040  OT Stop Time: 1105  OT Total Time (min): 25 min    Billable Minutes:Evaluation 15  Therapeutic Activity 10    9/1/2022

## 2022-09-01 NOTE — ASSESSMENT & PLAN NOTE
Patient with acute kidney injury likely due to IVVD/dehydration DINORAH is currently worsening. Labs reviewed- Renal function/electrolytes with Estimated Creatinine Clearance: 36.5 mL/min (A) (based on SCr of 2.3 mg/dL (H)). according to latest data. Monitor urine output and serial BMP and adjust therapy as needed. Avoid nephrotoxins and renally dose meds for GFR listed above.     Likely 2/2 recent reported diarrhea   IVFs  Check prograf level   8/31/22 Improving with gentle IV hydration, monitor renal function, [facility] am  9/1/22: Continues to improve- change to bicarb drip

## 2022-09-01 NOTE — PLAN OF CARE
09/01/22 1622   Post-Acute Status   Post-Acute Authorization Placement   Post-Acute Placement Status Pending payor review/awaiting authorization (if required)   Discharge Delays None known at this time   Discharge Plan   Discharge Plan A Skilled Nursing Facility     Swer spoke with patient about returning to [facility]. Patient agreeable if that is his only placement option. Swer sent referral to the [facility] for review but patient was clinically denied. Swer confirmed that [facility] will accept pt be COVID+. Swer sent updated clinicals via [facility] [place] and requested authorization be submitted.     Swer to f/u in AM.

## 2022-09-01 NOTE — PLAN OF CARE
OT sanjay completed. Sup>sit min A, unable to lateral scoot, sit>sup CGA. Recommending SNF at d/c.

## 2022-09-02 PROBLEM — G93.40 ACUTE ENCEPHALOPATHY: Status: RESOLVED | Noted: 2022-01-01 | Resolved: 2022-01-01

## 2022-09-02 NOTE — DISCHARGE INSTRUCTIONS
Nonweightbearing left lower extremity   Orthopedic surgery will await medical stabilization before adjustment of his external fixator device. They will see you in outpatient setting to coordination surgical intervention.   Daily pin care and dressing changes

## 2022-09-02 NOTE — DISCHARGE SUMMARY
"[facility] - Tele[facility] ([facility])  [facility] Medicine  Discharge Summary      Patient Name: Lavelle Ladd  MRN: [id number]  Patient Class: IP- Inpatient  Admission Date: 8/30/2022  Hospital Length of Stay: 2 days  Discharge Date and Time:  09/02/2022 4:13 PM  Attending Physician: Wili Bartlett MD   Discharging Provider: Albino Dimas NP  Primary Care Provider: Primary Doctor No      HPI:   The patient is a 66 yo with HTN, CAD, DM, PVD, kidney transplant 2016, COPD, right BKA, left transmetatarsal amputation, and s/p closed reduction left tibial and fibular shaft fractures with application of external fixation device on 8/1/22 who [facility] with AMS, Hypoxemia, and hypotension. AASI gave Narcan on scene with improvement in mental status and hypotension. Pt also complains of generalized generalized weakness, and diarrhea.     In the ED, Temp 95.9F, BP 90/52. WBC normal, BUN 66, Serum Cr 4.4. Bicarb 16. Procalcitonin 12.61. +COVID  CXr-showed nothing acute. UA pending     On exam, pt AAO to person, place, and date. However, he is unable to answer any of HPI or ROS questions. He only answers "I don't know" in an agitated tone.     The patient will be placed in observation under hospital medicine       * No surgery found *      Hospital Course:   The patient is a 66 yo with HTN, CAD, DM, PVD, kidney transplant 2016, COPD, right BKA, left transmetatarsal amputation, and s/p closed reduction left tibial and fibular shaft fractures with application of external fixation device on 8/1/22 admitted for AMS, DINORAH, Acute hypoxic respiratory failure, Pneumonia due to COVID-19, Hyponatremia, and metabolic acidosis. Pt was given IV narcan PTA with +response. Hypothermic on arrival. Serum Cr 4.4. Procal 12.6.    8/31/22 No acute events overnight. The patient did have mildly worsening O2 requirements and is now requiring 5LNC. The patient did complain of midsternal chest pain today. CXR, EKG and troponin were " negative. CXR did show lower lung volumes with more vascular crowding or atelectasis throughout the lungs. Encouraged incentive spirometry. Will stop fluids. Contiue current management. Will transition to inpatient.     9/1/22: +generalized body aches. AMS resolved. Pt currently on 5 liters NC.  WBC normal,afebrile, BC show NGTD. DINORAH slowly improving. Serum Cr 2.3. Bicarb 14.Ordered Bicarb drip   PT/OT- CM consulted for pt to return to [facility] when oxygenation stable and serum cr improves.   Ortho consulted for timeline with removing left LE external fixator   9/2/22 has been weaned to room air. Will need to follow up with orthopedic surgery outpatient for external fixator removal. He has been accepted to [facility]. Will plan for discharge.       Goals of Care Treatment Preferences:  Code Status: Full Code    Health care agent: Kecia Miller, ([phone])  Health care agent number: No value filed.                   Consults:   Consults (From admission, onward)        Status Ordering Provider     Inpatient consult to Orthopedic Surgery  Once        Provider:  Daisy Mckenzie MD    Acknowledged AISHA AYON     Inpatient consult to Nephrology  Once        Provider:  (Not yet assigned)    SAI Moseley          No new Assessment & Plan notes have been filed under this hospital service since the last note was generated.  Service: Hospital Medicine    Final Active Diagnoses:    Diagnosis Date Noted POA    PRINCIPAL PROBLEM:  Acute hypoxemic respiratory failure and bilateral pneumonia due to COVID-19 [U07.1, J96.01] 08/30/2022 Yes    Hyponatremia [E87.1] 08/30/2022 Yes    Closed fracture of left tibia and fibula [S82.202A, S82.402A] 08/04/2022 Yes    Hx of right BKA [Z89.511] 11/01/2018 Not Applicable    S/P transmetatarsal amputation of foot, left [Z89.432] 10/31/2018 Not Applicable    Renal transplant, status post [Z94.0] 11/30/2016 Not Applicable    Coronary artery disease of native  artery of native heart with stable angina pectoris [I25.118] 07/01/2016 Yes    Type 2 diabetes mellitus with hyperglycemia, with long-term current use of insulin [E11.65, Z79.4]  Not Applicable    Essential hypertension [I10] 02/20/2015 Yes      Problems Resolved During this Admission:    Diagnosis Date Noted Date Resolved POA    Acute encephalopathy [G93.40] 08/30/2022 09/02/2022 Yes    Hypothermia [T68.XXXA] 08/30/2022 09/01/2022 Yes    DINORAH (acute kidney injury) [N17.9] 09/25/2016 09/02/2022 Yes    Metabolic acidosis [E87.2] 10/29/2013 09/02/2022 Yes       Discharged Condition: stable    Disposition: Skilled Nursing Facility    Follow Up:   Follow-up Information     Tam Mcdermott PA-C Follow up.    Specialty: Orthopedic Surgery  Why: follow up with Orthopedic surgery, maryann romero  in 2 weeks.  Contact information:  [address] [place] [address] [phone]                       Patient Instructions:   No discharge procedures on file.    Significant Diagnostic Studies: Labs:   CMP   Recent Labs   Lab 09/01/22 0507 09/02/22  0533   * 137   K 4.2 3.4*    101   CO2 14* 27   * 298*   BUN 52* 33*   CREATININE 2.3* 1.3   CALCIUM 8.5* 8.1*   PROT 6.1 5.7*   ALBUMIN 2.2* 2.2*   BILITOT 0.3 0.3   ALKPHOS 142* 124   AST 24 15   ALT 19 15   ANIONGAP 14 9    and CBC   Recent Labs   Lab 09/02/22  0533   WBC 7.89   HGB 11.1*   HCT 33.9*          Pending Diagnostic Studies:     Procedure Component Value Units Date/Time    CBC with Automated Differential [[id number]] Collected: 09/01/22 0507    Order Status: Sent Lab Status: In process Updated: 09/01/22 0508    Specimen: Blood          Medications:  Reconciled Home Medications:      Medication List      CONTINUE taking these medications    acetaminophen 500 MG tablet  Commonly known as: TYLENOL  Take 2 tablets (1,000 mg total) by mouth 3 (three) times daily.     amLODIPine 10 MG tablet  Commonly known as: NORVASC  Take 10 mg by mouth once  daily.     apixaban 2.5 mg Tab  Commonly known as: ELIQUIS  Take 1 tablet (2.5 mg total) by mouth 2 (two) times daily. for 24 days     aspirin 81 MG EC tablet  Commonly known as: ECOTRIN  Take 1 tablet (81 mg total) by mouth once daily.     atorvastatin 80 MG tablet  Commonly known as: LIPITOR  Take 80 mg by mouth once daily.     carvediloL 12.5 MG tablet  Commonly known as: COREG  Take 1 tablet (12.5 mg total) by mouth 2 (two) times daily.     ergocalciferol 50,000 unit Cap  Commonly known as: ERGOCALCIFEROL  Take 1 capsule (50,000 Units total) by mouth every 7 days. Take on Mondays     gabapentin 300 MG capsule  Commonly known as: NEURONTIN  Take 300 mg by mouth 3 (three) times daily.     linaCLOtide 72 mcg Cap capsule  Commonly known as: LINZESS  Take 1 capsule (72 mcg total) by mouth before breakfast.     mycophenolate 250 mg Cap  Commonly known as: CELLCEPT  Take 250 mg by mouth 2 (two) times daily.     nitroGLYCERIN 0.4 MG SL tablet  Commonly known as: NITROSTAT  Place 1 tablet (0.4 mg total) under the tongue every 5 (five) minutes as needed.     ondansetron 4 MG tablet  Commonly known as: ZOFRAN  Take 4 mg by mouth every 8 (eight) hours as needed for Nausea.     oxyCODONE 15 MG Tab  Commonly known as: ROXICODONE  Take 10 mg by mouth every 4 (four) hours as needed for Pain.     OZEMPIC 1 mg/dose (2 mg/1.5 mL) Pnij  Generic drug: semaglutide  Inject 1 mg under the skin every 7 days.     sertraline 25 MG tablet  Commonly known as: ZOLOFT  Take 25 mg by mouth once daily.     tacrolimus 0.5 MG Cap  Commonly known as: PROGRAF  Take 2 capsules (1 mg total) by mouth every 12 (twelve) hours.        STOP taking these medications    cloNIDine 0.1 MG tablet  Commonly known as: CATAPRES     furosemide 40 MG tablet  Commonly known as: LASIX     ipratropium 0.02 % nebulizer solution  Commonly known as: ATROVENT     isosorbide mononitrate 30 MG 24 hr tablet  Commonly known as: IMDUR     ketoconazole 2 % cream  Commonly known  as: NIZORAL     levalbuterol 1.25 mg/3 mL nebulizer solution  Commonly known as: XOPENEX     metoprolol tartrate 25 MG tablet  Commonly known as: LOPRESSOR            Indwelling Lines/Drains at time of discharge:   Lines/Drains/Airways     Peripherally Inserted Central Catheter Line  Duration           PICC Double Lumen right basilic -- days                Time spent on the discharge of patient: >35 minutes         Albino Dimas NP  Department of Hospital Medicine  [facility] - Telemetry ([facility])

## 2022-09-02 NOTE — PLAN OF CARE
[facility] - Tele[facility] (Hospital)  Discharge Final Note    Primary Care Provider: Primary Doctor No    Expected Discharge Date: 9/2/2022    Final Discharge Note (most recent)       Final Note - 09/02/22 1612          Final Note    Assessment Type Final Discharge Note     Anticipated Discharge Disposition Skilled Nursing Facility     Hospital Resources/Appts/Education Provided Post-Acute resouces added to AVS        Post-Acute Status    Post-Acute Authorization Placement     Post-Acute Placement Status Set-up Complete/Auth obtained     Discharge Delays None known at this time                   Pt to discharge to [facility] today. Pt notified by phone. [employer] auth received for patient to discharge. Swer sent discharge clinicals via [facility] Facility to arrange transportation. Swer to f/u with Rn to provide number for report and transportation ETA.    No additional CM needs for discharge.     Important Message from Medicare  Important Message from Medicare regarding Discharge Appeal Rights: Given to patient/caregiver, Explained to patient/caregiver, Signed/date by patient/caregiver     Date IMM was signed: 09/02/22  Time IMM was signed: 1104    Contact Info       Tam Mcdermott PA-C   Specialty: Orthopedic Surgery    [address] [address]   Phone: [phone]       Next Steps: Follow up    Instructions: follow up with Orthopedic surgerymaryann  in 2 weeks.

## 2022-09-02 NOTE — ASSESSMENT & PLAN NOTE
External fixator in place   Continue Eliquis for DVT ppx  8/31/22 Ortho consulted to evaluate external fixator.   9/1/22; Ortho consulted for timeline with removing left LE external fixator   9/2/22 no urgent surgical need at present. Will follow up outpatient for surgical removal of fixator.

## 2022-09-02 NOTE — SUBJECTIVE & OBJECTIVE
Interval History: on room air.  Awaiting SNF placement.     Review of Systems   Constitutional:  Positive for activity change and fatigue. Negative for appetite change, chills, diaphoresis and fever.   HENT:  Negative for congestion, nosebleeds, sore throat and trouble swallowing.    Eyes:  Negative for pain, discharge and visual disturbance.   Respiratory:  Positive for shortness of breath. Negative for apnea, cough, chest tightness, wheezing and stridor.    Cardiovascular:  Negative for chest pain, palpitations and leg swelling.   Gastrointestinal:  Negative for abdominal distention, abdominal pain, blood in stool, constipation, diarrhea, nausea and vomiting.   Endocrine: Negative for cold intolerance and heat intolerance.   Genitourinary:  Negative for difficulty urinating, dysuria, flank pain, frequency and urgency.   Musculoskeletal:  Positive for arthralgias (left ankle pain) and myalgias. Negative for back pain, joint swelling, neck pain and neck stiffness.   Skin:  Negative for rash and wound.   Allergic/Immunologic: Negative for food allergies and immunocompromised state.   Neurological:  Negative for dizziness, seizures, syncope, facial asymmetry, weakness, light-headedness and headaches.   Hematological:  Negative for adenopathy.   Psychiatric/Behavioral:  Negative for agitation, behavioral problems and confusion. The patient is not nervous/anxious.        Objective:     Vital Signs (Most Recent):  Temp: 98.5 °F (36.9 °C) (09/02/22 1145)  Pulse: 79 (09/02/22 1312)  Resp: 18 (09/02/22 1312)  BP: (!) 157/70 (09/02/22 1145)  SpO2: (!) 93 % (09/02/22 1312)   Vital Signs (24h Range):  Temp:  [96.7 °F (35.9 °C)-98.7 °F (37.1 °C)] 98.5 °F (36.9 °C)  Pulse:  [68-88] 79  Resp:  [16-20] 18  SpO2:  [92 %-96 %] 93 %  BP: (111-157)/(56-75) 157/70     Weight: 102 kg (224 lb 13.9 oz)  Body mass index is 31.36 kg/m².    Intake/Output Summary (Last 24 hours) at 9/2/2022 1441  Last data filed at 9/2/2022 0542  Gross per 24  hour   Intake 240 ml   Output 2100 ml   Net -1860 ml      Physical Exam  Vitals and nursing note reviewed.   Constitutional:       Appearance: He is well-developed.   HENT:      Head: Normocephalic and atraumatic.      Nose: Nose normal.   Eyes:      General: No scleral icterus.     Conjunctiva/sclera: Conjunctivae normal.      Pupils: Pupils are equal, round, and reactive to light.   Cardiovascular:      Rate and Rhythm: Normal rate and regular rhythm.      Heart sounds: Normal heart sounds. No murmur heard.    No friction rub. No gallop.   Pulmonary:      Effort: Pulmonary effort is normal. No respiratory distress.      Breath sounds: Wheezing and rhonchi present.   Abdominal:      General: Bowel sounds are normal. There is no distension.      Palpations: Abdomen is soft.      Tenderness: There is no abdominal tenderness.   Musculoskeletal:         General: No tenderness or deformity. Normal range of motion.      Cervical back: Normal range of motion and neck supple.      Comments: Right BKA- incision healed   Left TMA with external fixator on left leg. Incision at TMA healed.   Pin sites for external fixator all without erythema or drainage    Skin:     General: Skin is warm and dry.      Coloration: Skin is pale.      Findings: No erythema or rash.   Neurological:      Mental Status: He is alert and oriented to person, place, and time.      Cranial Nerves: No cranial nerve deficit.   Psychiatric:         Behavior: Behavior is agitated.         Cognition and Memory: Cognition is impaired.         Significant Labs: All pertinent labs within the past 24 hours have been reviewed.  CBC:   Recent Labs   Lab 09/02/22  0533   WBC 7.89   HGB 11.1*   HCT 33.9*        CMP:   Recent Labs   Lab 09/01/22  0507 09/02/22  0533   * 137   K 4.2 3.4*    101   CO2 14* 27   * 298*   BUN 52* 33*   CREATININE 2.3* 1.3   CALCIUM 8.5* 8.1*   PROT 6.1 5.7*   ALBUMIN 2.2* 2.2*   BILITOT 0.3 0.3   ALKPHOS 142* 124    AST 24 15   ALT 19 15   ANIONGAP 14 9       Significant Imaging: I have reviewed all pertinent imaging results/findings within the past 24 hours.

## 2022-09-02 NOTE — CARE UPDATE
Home Oxygen Evaluation - [facility] - Cardiopulmonary Department      Date Performed: 2022      1) Patient's Home O2 Sat on room air, while at rest: Room Air SpO2 At Rest: (S) (P) 94 %        If O2 sats on room air at rest are 88% or below, patient qualifies.  Document O2 liter flow needed in Step 2.  If O2 sats are 89% or above, complete Step 3.        2)  If patient is not ambulated and O2 sats are <88%, what is the O2 liter flow required to meet ordered saturation? Home O2 Eval Comments: (P) PT DOES NOT QUALIFY    If O2 sats on room air while exercising remain 89% or above patient does not qualify, no further testing needed Document N/A in step 3. If O2 sats on room air while exercising are 88% or below, continue to Step 4.    3) Patient's O2 Sat on room air while exercisin) Patient's O2 Sat while exercising on O2:   at           (Must show improvement from #4 for patients to qualify)    Pt unable to ambulate, spo2 94% on RA. Pt will use wheelchair. Pt does not qualify at this time for home oxygen.

## 2022-09-02 NOTE — PT/OT/SLP PROGRESS
Occupational Therapy   Treatment    Name: Lavelle Ladd  MRN: [id number]  Admitting Diagnosis:  Acute hypoxemic respiratory failure due to COVID-19       Recommendations:     Discharge Recommendations: nursing facility, skilled  Discharge Equipment Recommendations:   (TBD at next level of care)  Barriers to discharge:  None    Assessment:     Lavelle Ladd is a 67 y.o. male with a medical diagnosis of Acute hypoxemic respiratory failure due to COVID-19.  He presents with the following performance deficits affecting function are weakness, impaired endurance, impaired self care skills, impaired functional mobility, impaired balance, impaired sensation, pain, orthopedic precautions, impaired cardiopulmonary response to activity, decreased safety awareness, edema, impaired skin.     Rehab Prognosis:  Good and Fair; patient would benefit from acute skilled OT services to address these deficits and reach maximum level of function.       Plan:     Patient to be seen 2 x/week to address the above listed problems via self-care/home management, therapeutic activities, therapeutic exercises  Plan of Care Expires: 09/15/22  Plan of Care Reviewed with: patient    Subjective     Pain/Comfort:  Pain Rating 1: 3/10  Location - Side 1: Left  Location 1: ankle  Pain Addressed 1:  (activity pacing)    Objective:     Communicated with: Nurse, Betzy, prior to session.  Patient found supine with external fixator, telemetry, peripheral IV, PICC line, bed alarm upon OT entry to room.    General Precautions: Standard, airborne, contact, droplet, fall   Orthopedic Precautions:LLE non weight bearing   Braces: N/A  Respiratory Status: Room air    Bed Mobility:    Patient completed Supine to Sit with contact guard assistance  Patient completed Sit to Supine with contact guard assistance   Lateral scooting to HOB while seated EOB with CGA    AMPA 6 Click ADL: 16    Treatment & Education:  Patient lethargic throughout majority of treatment.  Flat affect. Participated with minimal motivation despite apathy. Sat EOB x15 mins to engage in B UE AROM therex. Completed AROM vs green tband strengthening (issued band for HEP use). Completed x3 planes of motion with focus on tricep strengthening with tband in prep for OOB transfer. Patient holding eyes closed through majority of session. Will require reinforcement of all education. Encouraged to sit EOB with nursing assistance throughout the day until able to transfer OOB to chair.    Patient left supine with all lines intact, call button in reach, and bed alarm on    GOALS:   Multidisciplinary Problems       Occupational Therapy Goals          Problem: Occupational Therapy    Goal Priority Disciplines Outcome Interventions   Occupational Therapy Goal     OT, PT/OT Ongoing, Progressing    Description: Goals to be met by: 9/15/22     Patient will increase functional independence with ADLs by performing:    UE Dressing with Set-up Assistance.  Supine to sit with Modified [place].  Increased functional strength in B UE grossly by 1/2 MM grade.                       Time Tracking:     OT Date of Treatment: 09/02/22  OT Start Time: 1025  OT Stop Time: 1050  OT Total Time (min): 25 min    Billable Minutes:Therapeutic Activity 15  Therapeutic Exercise 10      9/2/2022

## 2022-09-02 NOTE — ASSESSMENT & PLAN NOTE

## 2022-09-02 NOTE — ASSESSMENT & PLAN NOTE
Patient has been updated on plan of care.    Patient with acute kidney injury likely due to IVVD/dehydration DINORAH is currently worsening. Labs reviewed- Renal function/electrolytes with Estimated Creatinine Clearance: 65.2 mL/min (based on SCr of 1.3 mg/dL). according to latest data. Monitor urine output and serial BMP and adjust therapy as needed. Avoid nephrotoxins and renally dose meds for GFR listed above.     Likely 2/2 recent reported diarrhea   IVFs  Check prograf level   8/31/22 Improving with gentle IV hydration, monitor renal function, CMP am  9/1/22: Continues to improve- change to bicarb drip  9/2/22  Kidney function now wnl

## 2022-09-02 NOTE — PT/OT/SLP PROGRESS
Physical Therapy  Treatment    Lavelle Ladd   MRN: [id number]   Admitting Diagnosis: Acute hypoxemic respiratory failure due to COVID-19    PT Received On: 09/02/22  PT Start Time: 1045     PT Stop Time: 1115    PT Total Time (min): 30 min       Billable Minutes:  Therapeutic Activity 30    Treatment Type: Treatment  PT/PTA: PTA     PTA Visit Number: 1       General Precautions: Standard, airborne, contact, droplet, fall  Orthopedic Precautions: LLE non weight bearing   Braces: N/A  Respiratory Status: Room air         Subjective:  Communicated with patient's nurse, Betzy, and completed Epic chart review prior to session.  Patient agreed to PT session with max encouragement from therapist.    Pain/Comfort  Pain Rating 1: 3/10  Location - Side 1: Left  Location 1: ankle  Pain Addressed 1: Other (see comments) (ACTIVITY PACING)    Objective:   Patient found with: external fixator, telemetry, peripheral IV, PICC line, bed alarm, pulse ox (continuous)    Functional Mobility:  Supine > sit EOB: CGA    Forward scoot towards EOB: SBA    Lateral scooting towards HOB: CGA (VC for most effective technique)    Seated EOB x15 min total focusing on increased tolerance to upright position, core stability, trunk control and CV endurance. Maintained self supported and unsupported sitting balance with SPV.  Completed x10 reps AROM TE to BLE: LAQ and Hip Flex    Educated patient on importance of increased tolerance to upright position and direct impact on CV endurance and strength. Patient encouraged to sit EOB, with nursing SPV & assist, to simulate chair position until able to safely complete chair T/F. Patient given a minimum goal of majority of the day to be spent in upright, especially with all meals. Encouraged patient to perform AROM TE to BLE throughout the day within all available planes of motion. Re enforced importance of utilizing call light to meet needs in room and not attempt to get up without staff assistance.    Sit  "> supine: CGA (multiple cues and physical assist to prevent WB through L LE)  Attempted to educate patient that he is currently NWB to LLE and should not put any pressure through the leg but patient was highly resistant and responded with "Yes I can".    Pillow used for positioning patient into R SL as this appeared to be his position of comfort.    AM-PAC 6 CLICK MOBILITY  How much help from another person does this patient currently need?   1 = Unable, Total/Dependent Assistance  2 = A lot, Maximum/Moderate Assistance  3 = A little, Minimum/Contact Guard/Supervision  4 = None, Modified [place]/Independent         AM-PAC Raw Score CMS G-Code Modifier Level of Impairment Assistance   6 % Total / Unable   7 - 9 CM 80 - 100% Maximal Assist   10 - 14 CL 60 - 80% Moderate Assist   15 - 19 CK 40 - 60% Moderate Assist   20 - 22 CJ 20 - 40% Minimal Assist   23 CI 1-20% SBA / CGA   24 CH 0% Independent/ Mod I     Patient left right sidelying with call button in reach and bed alarm on.    Assessment:  Lavelle Ladd is a 67 y.o. male with a medical diagnosis of Acute hypoxemic respiratory failure due to COVID-19 and presents with overall decline in functional mobility. Patient would continue to benefit from skilled PT to address functional limitations listed below in order to return to PLOF/decrease caregiver burden. Patient was lethargic and had a flat, moderately resistant affect throughout session. Patient is non compliant with LLE NWB and does not respond to education/correction of behavior.    Rehab identified problem list/impairments: Rehab identified problem list/impairments: weakness, impaired endurance, impaired sensation, impaired self care skills, impaired functional mobility, impaired balance, impaired cognition, decreased coordination, decreased lower extremity function, decreased safety awareness, pain, decreased ROM, impaired coordination, impaired skin, edema, impaired cardiopulmonary response " to activity, orthopedic precautions    Rehab potential is fair.    Activity tolerance: Poor    Discharge recommendations: Discharge Facility/Level of Care Needs: nursing facility, skilled     Barriers to discharge:      Equipment recommendations: Equipment Needed After Discharge: other (see comments) (TBD BY NEXT LEVEL OF CARE)     GOALS:   Multidisciplinary Problems       Physical Therapy Goals          Problem: Physical Therapy    Goal Priority Disciplines Outcome Goal Variances Interventions   Physical Therapy Goal     PT, PT/OT      Description: LTG'S TO BE MET IN 14 DAYS (9-15-22)  PT WILL REQUIRE CGA FOR BED MOBILITY  PT WILL REQUIRE MODA FOR SEATED POSTERIOR SCOOT FROM BED>CHAIR                            PLAN:    Patient to be seen 3 x/week  to address the above listed problems via therapeutic activities, therapeutic exercises  Plan of Care expires: 09/02/22  Plan of Care reviewed with: patient         09/02/2022

## 2022-09-02 NOTE — CONSULTS
[facility] - Telemetry ([facility])  Nephrology  Consult Note    Patient Name: Lavelle Ladd  MRN: [id number]  Admission Date: 2022  Hospital Length of Stay: 2 days  Attending Provider: Wili Bartlett MD   Primary Care Physician: Primary Doctor No  Principal Problem:Acute hypoxemic respiratory failure due to COVID-19    Inpatient consult to Nephrology  Consult performed by: Jose David Hooker MD  Consult ordered by: Ashley Tavarez NP  Reason for consult: DINORAH, kidney transplant      Subjective:     HPI:  67-year-old male known to me from previous hospital admissions.  Status post kidney transplant in 2016.  Admitted with COVID-19 infection and shortness of breath.  Nephrology has been consulted for assistance with management of kidney transplant and acute kidney injury.  Patient was seen in his hospital room.  In bed resting comfortably.  No acute distress noted.  States he continues to have a pretty bad cough.  He states it is worse at night.    Past Medical History:   Diagnosis Date    DINORAH (acute kidney injury) 2016    Arthritis     CAD in native artery 2019    CHF (congestive heart failure)     Chronic obstructive pulmonary disease 2016    Coronary artery disease involving native coronary artery of native heart without angina pectoris 2016    CRI (chronic renal insufficiency) 2019     donor kidney transplant for DM 16     Induction with Thymo x3 and IV solumedrol to total 875mg  Kidney Biopsy  2016: 16 glomeruli, ACR type 1 AVR type 2, significant microcirculatory changes, c4d negative, No DSA, 5 to10% fibrosis. Treated with thymo x8 2016- no rejection      Diastolic heart failure     Encounter for blood transfusion     ESRD on RRT since 10/2013 10/29/2013    Biopsy proven diabetic nephropathy and lymphoplasmacytic interstitial infiltrate not c/w with AIN (ddx sjogrens or assoc with tamm-horsefall protein extravasation)     GERD (gastroesophageal reflux disease)      History of hepatitis C, s/p successful Rx w/ SVR12 - 4/2017 4/5/2017    Completed 12 weeks harvoni w/ SVR    Hyperlipidemia     Hypertension     PAD (peripheral artery disease) 7/21/2019    PIC line (peripherally inserted central catheter) flush     Prophylactic immunotherapy     Proteinuria     PVD (peripheral vascular disease) 6/26/2017    RLE BKA CT 12/11/16 Extensive atherosclerotic disease of the aorta and mesenteric arteries.     Renal hypertension     Type 2 diabetes mellitus with diabetic neuropathy, with long-term current use of insulin 12/1/2016    Vitamin B12 deficiency        Past Surgical History:   Procedure Laterality Date    AORTOGRAPHY WITH SERIALOGRAPHY N/A 6/14/2018    Procedure: LEFT LEG ANGIOGRAM;  Surgeon: Donal Mcdonald MD;  Location: [facility] CATH LAB;  Service: Vascular;  Laterality: N/A;    APPLICATION OF LARGE EXTERNAL FIXATION DEVICE TO TIBIA Left 8/4/2022    Procedure: APPLICATION, EXTERNAL FIXATION DEVICE, LARGE, TIBIA;  Surgeon: Good Villa MD;  Location: [facility] OR;  Service: Orthopedics;  Laterality: Left;    av bovine graft      Left UE    AV FISTULA PLACEMENT      left UE    CARDIAC CATHETERIZATION  02/2015    CLOSED REDUCTION OF INJURY OF TIBIA Left 8/4/2022    Procedure: CLOSED REDUCTION, TIBIA;  Surgeon: Good Villa MD;  Location: [facility] OR;  Service: Orthopedics;  Laterality: Left;  Closed reduction left tibial and fibular shaft fractures    CLOSURE OF WOUND Left 9/24/2018    Procedure: CLOSURE, WOUND;  Surgeon: Karla Wheeler DPM;  Location: [facility] OR;  Service: Podiatry;  Laterality: Left;  Secondary Wound closure, extensive    CLOSURE OF WOUND Left 11/5/2018    Procedure: CLOSURE, WOUND;  Surgeon: Karla Wheeler DPM;  Location: [facility] OR;  Service: Podiatry;  Laterality: Left;    DEBRIDEMENT OF MULTIPLE METATARSAL BONES Left 11/5/2018    Procedure: DEBRIDEMENT, METATARSAL BONE, 2 OR MORE BONES;  Surgeon: Karla Wheeler DPM;  Location: [facility] OR;  Service:  Podiatry;  Laterality: Left;    EXCISION OF SKIN Left 9/27/2019    Procedure: EXCISION, SKIN;  Surgeon: Lenard Alarcon MD;  Location: [address];  Service: Plastics;  Laterality: Left;  Plastics set, NIMS monitor, [employer]    FOOT AMPUTATION THROUGH METATARSAL Left 9/21/2018    Procedure: AMPUTATION, FOOT, TRANSMETATARSAL;  Surgeon: Karla Wheeler DPM;  Location: [facility] OR;  Service: Podiatry;  Laterality: Left;    FOOT AMPUTATION THROUGH METATARSAL Left 10/31/2018    Procedure: AMPUTATION, FOOT, TRANSMETATARSAL;  Surgeon: Karla Wheeler DPM;  Location: [facility] OR;  Service: Podiatry;  Laterality: Left;    FOOT AMPUTATION THROUGH METATARSAL Left 11/5/2018    Procedure: AMPUTATION, FOOT, TRANSMETATARSAL;  Surgeon: Karla Wheeler DPM;  Location: [facility] OR;  Service: Podiatry;  Laterality: Left;  revisional transmetatarsal amputation, Left foot    IRRIGATION AND DEBRIDEMENT OF LOWER EXTREMITY Left 10/31/2018    Procedure: IRRIGATION AND DEBRIDEMENT, LOWER EXTREMITY;  Surgeon: Karla Wheeler DPM;  Location: [facility] OR;  Service: Podiatry;  Laterality: Left;    KIDNEY TRANSPLANT  05/21/2016    LEFT HEART CATHETERIZATION Left 7/21/2019    Procedure: CATHETERIZATION, HEART, LEFT;  Surgeon: Andrew Valdez MD;  Location: [facility] CATH LAB;  Service: Cardiology;  Laterality: Left;    LEG AMPUTATION THROUGH KNEE  2011    right LE, started as nail puncture leading to diabetic ulcer    SKIN FULL THICKNESS GRAFT Left 10/7/2019    Procedure: APPLICATION, GRAFT, SKIN, FULL-THICKNESS;  Surgeon: Lenard Alarcon MD;  Location: [facility] OR 2ND FLR;  Service: Plastics;  Laterality: Left;    SURGICAL REMOVAL OF LESION OF FACE Right 10/7/2019    Procedure: EXCISION, LESION, FACE;  Surgeon: Lenard Alarcon MD;  [address]  Service: [address]astics;  Laterality: Right;       Review of patient's allergies indicates:  No Known Allergies  Current Facility-Administered Medications   Medication Frequency    0.9%  NaCl infusion PRN     acetaminophen tablet 650 mg Q4H PRN    acetaminophen tablet 650 mg Q8H PRN    albuterol inhaler 2 puff Q6H    amLODIPine tablet 10 mg Daily    apixaban tablet 5 mg BID    ascorbic acid (vitamin C) tablet 500 mg BID    aspirin EC tablet 81 mg Daily    atorvastatin tablet 80 mg Daily    carvediloL tablet 12.5 mg BID    cefepime in dextrose 5 % 1 gram/50 mL IVPB 1 g Q12H    clopidogreL tablet 75 mg Daily    dexAMETHasone tablet 2 mg Daily    dextrose 10% bolus 125 mL PRN    dextrose 10% bolus 250 mL PRN    gabapentin capsule 300 mg TID    glucagon (human recombinant) injection 1 mg PRN    glucose chewable tablet 16 g PRN    glucose chewable tablet 24 g PRN    insulin aspart U-100 pen 1-10 Units QID (AC + HS) PRN    insulin aspart U-100 pen 5 Units TIDWM    insulin detemir U-100 pen 30 Units Daily    multivitamin tablet Daily    mupirocin 2 % ointment BID    naloxone 0.4 mg/mL injection 0.4 mg PRN    nitroGLYCERIN SL tablet 0.4 mg Q5 Min PRN    ondansetron injection 4 mg Q8H PRN    oxyCODONE-acetaminophen 7.5-325 mg per tablet 1 tablet Q6H PRN    remdesivir 100 mg in sodium chloride 0.9% 250 mL infusion Daily    sertraline tablet 25 mg Daily    sodium bicarbonate 75 mEq in sodium chloride 0.45% 1,000 mL infusion Continuous    sodium bicarbonate tablet 650 mg TID    sodium chloride 0.9% flush 10 mL PRN    tacrolimus capsule 1 mg BID     Family History       Problem Relation (Age of Onset)    Cancer Father    Diabetes Father    Heart failure Father, Mother    Stroke Father          Tobacco Use    Smoking status: Former     Packs/day: 1.00     Years: 40.00     Pack years: 40.00     Types: Cigarettes     Quit date: 2013     Years since quittin.6    Smokeless tobacco: Never   Substance and Sexual Activity    Alcohol use: Yes     Alcohol/week: 6.0 standard drinks     Types: 6 Cans of beer per week     Comment: seldom    Drug use: No    Sexual activity: Never     Review of Systems   Constitutional:  Positive for  fatigue.   HENT: Negative.     Eyes: Negative.    Respiratory:  Positive for cough and shortness of breath.    Cardiovascular: Negative.    Gastrointestinal: Negative.    Genitourinary: Negative.    Musculoskeletal: Negative.    Skin: Negative.    Neurological: Negative.    Objective:     Vital Signs (Most Recent):  Temp: 97.9 °F (36.6 °C) (09/02/22 0805)  Pulse: 78 (09/02/22 0805)  Resp: 17 (09/02/22 0805)  BP: (!) 157/70 (09/02/22 0805)  SpO2: (!) 94 % (09/02/22 0805)  O2 Device (Oxygen Therapy): room air (09/02/22 0054)   Vital Signs (24h Range):  Temp:  [96.7 °F (35.9 °C)-98.7 °F (37.1 °C)] 97.9 °F (36.6 °C)  Pulse:  [73-88] 78  Resp:  [16-20] 17  SpO2:  [92 %-96 %] 94 %  BP: (111-157)/(56-75) 157/70     Weight: 102 kg (224 lb 13.9 oz) (09/02/22 0034)  Body mass index is 31.36 kg/m².  Body surface area is 2.26 meters squared.    I/O last 3 completed shifts:  In: 240 [P.O.:240]  Out: 3500 [Urine:3500]    Physical Exam  Constitutional:       Appearance: Normal appearance.   HENT:      Head: Normocephalic and atraumatic.   Eyes:      General: No scleral icterus.     Extraocular Movements: Extraocular movements intact.      Pupils: Pupils are equal, round, and reactive to light.   Cardiovascular:      Rate and Rhythm: Normal rate and regular rhythm.   Pulmonary:      Effort: Pulmonary effort is normal.      Breath sounds: Normal breath sounds.   Musculoskeletal:      Comments: External fixator on left lower extremity.  Right-sided lower extremity amputee.   Skin:     General: Skin is warm and dry.   Neurological:      General: No focal deficit present.      Mental Status: He is alert and oriented to person, place, and time.   Psychiatric:         Mood and Affect: Mood normal.         Behavior: Behavior normal.       Significant Labs:  BMP:   Recent Labs   Lab 08/31/22  0629 09/01/22  0507 09/02/22  0533   *   < > 298*   *   < > 137   K 3.2*   < > 3.4*      < > 101   CO2 15*   < > 27   BUN 59*   <  > 33*   CREATININE 3.0*   < > 1.3   CALCIUM 7.0*   < > 8.1*   MG 1.5*  --   --     < > = values in this interval not displayed.     CMP:   Recent Labs   Lab 09/02/22  0533   *   CALCIUM 8.1*   ALBUMIN 2.2*   PROT 5.7*      K 3.4*   CO2 27      BUN 33*   CREATININE 1.3   ALKPHOS 124   ALT 15   AST 15   BILITOT 0.3     All labs within the past 24 hours have been reviewed.    Significant Imaging:  Labs: Reviewed  Reviewed    Assessment/Plan:     Active Diagnoses:    Diagnosis Date Noted POA    PRINCIPAL PROBLEM:  Acute hypoxemic respiratory failure and bilateral pneumonia due to COVID-19 [U07.1, J96.01] 08/30/2022 Yes    Acute encephalopathy [G93.40] 08/30/2022 Yes    Hyponatremia [E87.1] 08/30/2022 Yes    Closed fracture of left tibia and fibula [S82.202A, S82.402A] 08/04/2022 Yes    Hx of right BKA [Z89.511] 11/01/2018 Not Applicable    S/P transmetatarsal amputation of foot, left [Z89.432] 10/31/2018 Not Applicable    Renal transplant, status post [Z94.0] 11/30/2016 Not Applicable    DINORAH (acute kidney injury) [N17.9] 09/25/2016 Yes    Coronary artery disease of native artery of native heart with stable angina pectoris [I25.118] 07/01/2016 Yes    Type 2 diabetes mellitus with hyperglycemia, with long-term current use of insulin [E11.65, Z79.4]  Not Applicable    Essential hypertension [I10] 02/20/2015 Yes    Metabolic acidosis [E87.2] 10/29/2013 Yes      Problems Resolved During this Admission:    Diagnosis Date Noted Date Resolved POA    Hypothermia [T68.XXXA] 08/30/2022 09/01/2022 Yes       Assessment and plan:    1. DINORAH:  Creatinine on admission was 4.4.  His now down to his baseline 1.3.  Consistent with prerenal azotemia in the setting of COVID-19 infection.    2. Kidney transplant status.  He underwent kidney transplant in 2016.  He is now at his baseline creatinine around 1.3.    3. Immunosuppression:  He continues on Prograf 1 mg a.m. 1 mg p.m..  Mycophenolate is on hold.      Prograf level  yesterday is reported at 13.8.  It appears to been drawn at appropriate time at 5:00 a.m..  Prograf level on 08/30/2022 was 9.8.    There are reported cases of Prograf levels being increased in the setting of remdesivir use for COVID-19.    Will plan to decrease Prograf to 1 mg in the morning and half a mg in the evening.    4. Potassium is slightly low 3.4.  Encourage p.o. intake.    Approximately 70 minutes was spent in face-to-face conversation and chart review.    Thank you for your consult.     Jose David Hooker MD  Nephrology  [facility] - Telemetry ([facility])

## 2022-09-02 NOTE — PROGRESS NOTES
"[facility] - Tele[facility] ([facility] Medicine  Progress Note    Patient Name: Lavelle Ladd  MRN: [id number]  Patient Class: IP- Inpatient   Admission Date: 8/30/2022  Length of Stay: 2 days  Attending Physician: Wili Bartlett MD  Primary Care Provider: Primary Doctor No    Subjective:     Principal Problem:Acute hypoxemic respiratory failure due to COVID-19        HPI:  The patient is a 68 yo with HTN, CAD, DM, PVD, kidney transplant 2016, COPD, right BKA, left transmetatarsal amputation, and s/p closed reduction left tibial and fibular shaft fractures with application of external fixation device on 8/1/22 who [facility] ED [facility] with AMS, Hypoxemia, and hypotension. AASI gave Narcan on scene with improvement in mental status and hypotension. Pt also complains of generalized generalized weakness, and diarrhea.     In the ED, Temp 95.9F, BP 90/52. WBC normal, BUN 66, Serum Cr 4.4. Bicarb 16. Procalcitonin 12.61. +COVID  CXr-showed nothing acute. UA pending     On exam, pt AAO to person, place, and date. However, he is unable to answer any of HPI or ROS questions. He only answers "I don't know" in an agitated tone.     The patient will be placed in observation under hospital medicine       Overview/Hospital Course:  The patient is a 68 yo with HTN, CAD, DM, PVD, kidney transplant 2016, COPD, right BKA, left transmetatarsal amputation, and s/p closed reduction left tibial and fibular shaft fractures with application of external fixation device on 8/1/22 admitted for AMS, DINORAH, Acute hypoxic respiratory failure, Pneumonia due to COVID-19, Hyponatremia, and metabolic acidosis. Pt was given IV narcan PTA with +response. Hypothermic on arrival. Serum Cr 4.4. Procal 12.6.    8/31/22 No acute events overnight. The patient did have mildly worsening O2 requirements and is now requiring 5LNC. The patient did complain of midsternal chest pain today. CXR, EKG and troponin were negative. CXR did show lower lung " volumes with more vascular crowding or atelectasis throughout the lungs. Encouraged incentive spirometry. Will stop fluids. Contiue current management. Will transition to inpatient.     9/1/22: +generalized body aches. AMS resolved. Pt currently on 5 liters NC.  WBC normal,afebrile, BC show NGTD. DINORAH slowly improving. Serum Cr 2.3. Bicarb 14.Ordered Bicarb drip   PT/OT- CM consulted for pt to return to [facility] when oxygenation stable and serum cr improves.   Ortho consulted for timeline with removing left LE external fixator   9/2/22 has been weaned to room air. Will need to follow up with orthopedic surgery outpatient for external fixator removal.       Interval History: on room air.  Awaiting SNF placement.     Review of Systems   Constitutional:  Positive for activity change and fatigue. Negative for appetite change, chills, diaphoresis and fever.   HENT:  Negative for congestion, nosebleeds, sore throat and trouble swallowing.    Eyes:  Negative for pain, discharge and visual disturbance.   Respiratory:  Positive for shortness of breath. Negative for apnea, cough, chest tightness, wheezing and stridor.    Cardiovascular:  Negative for chest pain, palpitations and leg swelling.   Gastrointestinal:  Negative for abdominal distention, abdominal pain, blood in stool, constipation, diarrhea, nausea and vomiting.   Endocrine: Negative for cold intolerance and heat intolerance.   Genitourinary:  Negative for difficulty urinating, dysuria, flank pain, frequency and urgency.   Musculoskeletal:  Positive for arthralgias (left ankle pain) and myalgias. Negative for back pain, joint swelling, neck pain and neck stiffness.   Skin:  Negative for rash and wound.   Allergic/Immunologic: Negative for food allergies and immunocompromised state.   Neurological:  Negative for dizziness, seizures, syncope, facial asymmetry, weakness, light-headedness and headaches.   Hematological:  Negative for adenopathy.    Psychiatric/Behavioral:  Negative for agitation, behavioral problems and confusion. The patient is not nervous/anxious.        Objective:     Vital Signs (Most Recent):  Temp: 98.5 °F (36.9 °C) (09/02/22 1145)  Pulse: 79 (09/02/22 1312)  Resp: 18 (09/02/22 1312)  BP: (!) 157/70 (09/02/22 1145)  SpO2: (!) 93 % (09/02/22 1312)   Vital Signs (24h Range):  Temp:  [96.7 °F (35.9 °C)-98.7 °F (37.1 °C)] 98.5 °F (36.9 °C)  Pulse:  [68-88] 79  Resp:  [16-20] 18  SpO2:  [92 %-96 %] 93 %  BP: (111-157)/(56-75) 157/70     Weight: 102 kg (224 lb 13.9 oz)  Body mass index is 31.36 kg/m².    Intake/Output Summary (Last 24 hours) at 9/2/2022 1441  Last data filed at 9/2/2022 0542  Gross per 24 hour   Intake 240 ml   Output 2100 ml   Net -1860 ml      Physical Exam  Vitals and nursing note reviewed.   Constitutional:       Appearance: He is well-developed.   HENT:      Head: Normocephalic and atraumatic.      Nose: Nose normal.   Eyes:      General: No scleral icterus.     Conjunctiva/sclera: Conjunctivae normal.      Pupils: Pupils are equal, round, and reactive to light.   Cardiovascular:      Rate and Rhythm: Normal rate and regular rhythm.      Heart sounds: Normal heart sounds. No murmur heard.    No friction rub. No gallop.   Pulmonary:      Effort: Pulmonary effort is normal. No respiratory distress.      Breath sounds: Wheezing and rhonchi present.   Abdominal:      General: Bowel sounds are normal. There is no distension.      Palpations: Abdomen is soft.      Tenderness: There is no abdominal tenderness.   Musculoskeletal:         General: No tenderness or deformity. Normal range of motion.      Cervical back: Normal range of motion and neck supple.      Comments: Right BKA- incision healed   Left TMA with external fixator on left leg. Incision at TMA healed.   Pin sites for external fixator all without erythema or drainage    Skin:     General: Skin is warm and dry.      Coloration: Skin is pale.      Findings: No  erythema or rash.   Neurological:      Mental Status: He is alert and oriented to person, place, and time.      Cranial Nerves: No cranial nerve deficit.   Psychiatric:         Behavior: Behavior is agitated.         Cognition and Memory: Cognition is impaired.         Significant Labs: All pertinent labs within the past 24 hours have been reviewed.  CBC:   Recent Labs   Lab 09/02/22  0533   WBC 7.89   HGB 11.1*   HCT 33.9*        CMP:   Recent Labs   Lab 09/01/22  0507 09/02/22  0533   * 137   K 4.2 3.4*    101   CO2 14* 27   * 298*   BUN 52* 33*   CREATININE 2.3* 1.3   CALCIUM 8.5* 8.1*   PROT 6.1 5.7*   ALBUMIN 2.2* 2.2*   BILITOT 0.3 0.3   ALKPHOS 142* 124   AST 24 15   ALT 19 15   ANIONGAP 14 9       Significant Imaging: I have reviewed all pertinent imaging results/findings within the past 24 hours.      Assessment/Plan:      * Acute hypoxemic respiratory failure and bilateral pneumonia due to COVID-19  Patient is identified as Severe COVID-19 based on hypoxemia with O2 saturations <94% on room air or on ambulation   Initiate standard COVID protocols; COVID-19 testing ,Infection Control notification  and isolation- respiratory, contact and droplet per protocol    Diagnostics: (leukopenia, hyponatremia, hyperferritinemia, elevated troponin, elevated d-dimer, age, and comorbidities are significant predictors of poor clinical outcome)  CBC, CMP, Procalcitonin, Ferritin, CRP, LDH, BNP and Portable CXR    Management: Initiate targeted therapy with Remdesivir, 200mg IV x1, followed by 100mg IV daily x5 days total, Dexamethasone PO/IV 6mg daily x10 days and Anticoagulation, Patient admitted to non-critical care unit- Will initiate full dose anticoagulation with continuing Eliquis and Inhaled bronchodilators as needed for shortness of breath.  8/31/22 The patient did have mildly worsening O2 requirements and is now requiring 5LNC. The patient did complain of midsternal chest pain today. CXR,  EKG and troponin were negative. CXR did show lower lung volumes with more vascular crowding or atelectasis throughout the lungs. Encouraged incentive spirometry. Will stop fluids. Contiue current management. Will transition to inpatient.   9/1/22: respiratory status stable on 5 liters   9/2/22  Resolved. Currently on room air    Hyponatremia  NS iVFs  Monitor       Closed fracture of left tibia and fibula  External fixator in place   Continue Eliquis for DVT ppx  8/31/22 Ortho consulted to evaluate external fixator.   9/1/22; Ortho consulted for timeline with removing left LE external fixator   9/2/22 no urgent surgical need at present. Will follow up outpatient for surgical removal of fixator.     Hx of right BKA        S/P transmetatarsal amputation of foot, left  Site- healed, no breakdown  Monitor     Renal transplant, status post  Renal transplant in 2016  Consult nephrology   Cont tacrolimus-check level  Hold Cellcept due to infection       DINORAH (acute kidney injury)  Patient with acute kidney injury likely due to IVVD/dehydration DINORAH is currently worsening. Labs reviewed- Renal function/electrolytes with Estimated Creatinine Clearance: 65.2 mL/min (based on SCr of 1.3 mg/dL). according to latest data. Monitor urine output and serial BMP and adjust therapy as needed. Avoid nephrotoxins and renally dose meds for GFR listed above.     Likely 2/2 recent reported diarrhea   IVFs  Check prograf level   8/31/22 Improving with gentle IV hydration, monitor renal function, CMP am  9/1/22: Continues to improve- change to bicarb drip  9/2/22  Kidney function now wnl    Coronary artery disease of native artery of native heart with stable angina pectoris  No CP  Cont ASA, Plavix, statin  Hold BB      Type 2 diabetes mellitus with hyperglycemia, with long-term current use of insulin  Patient's FSGs are uncontrolled due to hyperglycemia on current medication regimen.  Last A1c reviewed-   Lab Results   Component Value Date     HGBA1C 7.4 (H) 08/04/2022     Most recent fingerstick glucose reviewed-   Recent Labs   Lab 08/31/22  1132 08/31/22  1711 08/31/22 2006 09/01/22  0542   POCTGLUCOSE 220* 299* 325* 413*     Current correctional scale  Low  Maintain anti-hyperglycemic dose as follows-   Antihyperglycemics (From admission, onward)    Start     Stop Route Frequency Ordered    09/01/22 1053  insulin aspart U-100 pen 1-10 Units         -- SubQ Before meals & nightly PRN 09/01/22 0954    09/01/22 1000  insulin detemir U-100 pen 20 Units         -- SubQ Daily 09/01/22 0950        9/1/22: Hyperglycemia- add Levemir and change to moderate dose NISS  Hold Oral hypoglycemics while patient is in the hospital.    Essential hypertension  BP low- hold antihypertensives       Metabolic acidosis  Likely 2/2 DINORAH, dehydration   IVFs     9/1/22: worsening - bicarb drip        VTE Risk Mitigation (From admission, onward)         Ordered     apixaban tablet 5 mg  2 times daily         09/01/22 1320                Discharge Planning   LISETH:      Code Status: Full Code   Is the patient medically ready for discharge?:     Reason for patient still in hospital (select all that apply): Treatment  Discharge Plan A: Skilled Nursing Facility   Discharge Delays: None known at this time          Albino Dimas NP  Department of Hospital Medicine   [facility] - Telemetry ([facility])

## 2022-09-13 PROBLEM — M86.172 ACUTE OSTEOMYELITIS OF LEFT CALCANEUS: Status: ACTIVE | Noted: 2022-01-01

## 2022-09-13 NOTE — TELEPHONE ENCOUNTER
Spoke with Dr Austin South / [facility] and informed him that I would contact the nurse o get the patient scheduled to come in today.    Spoke with nurse Garcia/ [facility] and informed her to have the patient sent to [facility] per Dr Villa. Understanding verbalized.

## 2022-09-13 NOTE — Clinical Note
90 ml of contrast were injected throughout the case. 0 mL of contrast was the total wasted during the case. 90 mL was the total amount used during the case.

## 2022-09-13 NOTE — CONSULTS
[facility] - Emergency Dept.  Orthopedics  Consult Note    Patient Name: Lavelle Ladd  MRN: [id number]  Admission Date: 2022  Hospital Length of Stay: 0 days  Attending Provider: Charito Oleary MD  Primary Care Provider: Primary Doctor No    Patient information was obtained from patient, past medical records, and ER records.     Inpatient consult to Orthopedic Surgery  Consult performed by: Tam Mcdermott PA-C  Consult ordered by: Charito Oleary MD      Subjective:     Principal Problem:  Left calcaneal osteomyelitis    Chief Complaint:   Chief Complaint   Patient presents with    Wound Infection     Wound infection left leg.         HPI: Lavelle Ladd is a 67-year-old male past medical history significant for hypertension, coronary artery disease, diabetes, peripheral vascular disease, kidney transplant in , COPD, right below knee amputation, left transmetatarsal amputation, and status post closed reduction of left tibial and fibular shaft fractures with external fixation on 2022 who is being admitted for treatment of left wound infection.  He was last seen by our service on 2022 when he was admitted for COVID pneumonia.  Patient reports he is recovering very well from that, but is wound care nurse became concerned today with foul-smelling drainage coming from his calcaneal pin sites.  Patient complains that the heel is sliding back and forth along the calcaneal pin.  He denies nausea, fever, chills, or other signs of infection    Past Medical History:   Diagnosis Date    DINORAH (acute kidney injury) 2016    Arthritis     CAD in native artery 2019    CHF (congestive heart failure)     Chronic obstructive pulmonary disease 2016    Coronary artery disease involving native coronary artery of native heart without angina pectoris 2016    CRI (chronic renal insufficiency) 2019     donor kidney transplant for DM 16     Induction with Thymo x3 and IV solumedrol to  total 875mg  Kidney Biopsy  5/31/2016: 16 glomeruli, ACR type 1 AVR type 2, significant microcirculatory changes, c4d negative, No DSA, 5 to10% fibrosis. Treated with thymo x8 6/21/2016- no rejection      Diastolic heart failure     Encounter for blood transfusion     ESRD on RRT since 10/2013 10/29/2013    Biopsy proven diabetic nephropathy and lymphoplasmacytic interstitial infiltrate not c/w with AIN (ddx sjogrens or assoc with tamm-horsefall protein extravasation)     GERD (gastroesophageal reflux disease)     History of hepatitis C, s/p successful Rx w/ SVR12 - 4/2017 4/5/2017    Completed 12 weeks harvoni w/ SVR    Hyperlipidemia     Hypertension     PAD (peripheral artery disease) 7/21/2019    PIC line (peripherally inserted central catheter) flush     Prophylactic immunotherapy     Proteinuria     PVD (peripheral vascular disease) 6/26/2017    RLE BKA CT 12/11/16 Extensive atherosclerotic disease of the aorta and mesenteric arteries.     Renal hypertension     Type 2 diabetes mellitus with diabetic neuropathy, with long-term current use of insulin 12/1/2016    Vitamin B12 deficiency        Past Surgical History:   Procedure Laterality Date    AORTOGRAPHY WITH SERIALOGRAPHY N/A 6/14/2018    Procedure: LEFT LEG ANGIOGRAM;  Surgeon: Donal Mcdonald MD;  Location: [facility] CATH LAB;  Service: Vascular;  Laterality: N/A;    APPLICATION OF LARGE EXTERNAL FIXATION DEVICE TO TIBIA Left 8/4/2022    Procedure: APPLICATION, EXTERNAL FIXATION DEVICE, LARGE, TIBIA;  Surgeon: Good Villa MD;  Location: [facility] OR;  Service: Orthopedics;  Laterality: Left;    av bovine graft      Left UE    AV FISTULA PLACEMENT      left UE    CARDIAC CATHETERIZATION  02/2015    CLOSED REDUCTION OF INJURY OF TIBIA Left 8/4/2022    Procedure: CLOSED REDUCTION, TIBIA;  Surgeon: Good Villa MD;  Location: [facility] OR;  Service: Orthopedics;  Laterality: Left;  Closed reduction left tibial and fibular shaft fractures    CLOSURE OF WOUND Left  9/24/2018    Procedure: CLOSURE, WOUND;  Surgeon: Karla Wheeler DPM;  Location: [facility] OR;  Service: Podiatry;  Laterality: Left;  Secondary Wound closure, extensive    CLOSURE OF WOUND Left 11/5/2018    Procedure: CLOSURE, WOUND;  Surgeon: Karla Wheeler DPM;  Location: [facility] OR;  Service: Podiatry;  Laterality: Left;    DEBRIDEMENT OF MULTIPLE METATARSAL BONES Left 11/5/2018    Procedure: DEBRIDEMENT, METATARSAL BONE, 2 OR MORE BONES;  Surgeon: Karla Wheeler DPM;  Location: [facility] OR;  Service: Podiatry;  Laterality: Left;    EXCISION OF SKIN Left 9/27/2019    Procedure: EXCISION, SKIN;  Surgeon: Lenard Alarcon MD;  Location: [address];  Service: Plastics;  Laterality: Left;  Plastics set, NIMS monitor, ACell    FOOT AMPUTATION THROUGH METATARSAL Left 9/21/2018    Procedure: AMPUTATION, FOOT, TRANSMETATARSAL;  Surgeon: Karla Wheeler DPM;  Location: [facility] OR;  Service: Podiatry;  Laterality: Left;    FOOT AMPUTATION THROUGH METATARSAL Left 10/31/2018    Procedure: AMPUTATION, FOOT, TRANSMETATARSAL;  Surgeon: Karla Wheeler DPM;  Location: [facility] OR;  Service: Podiatry;  Laterality: Left;    FOOT AMPUTATION THROUGH METATARSAL Left 11/5/2018    Procedure: AMPUTATION, FOOT, TRANSMETATARSAL;  Surgeon: Karla Wheeler DPM;  Location: [facility] OR;  Service: Podiatry;  Laterality: Left;  revisional transmetatarsal amputation, Left foot    IRRIGATION AND DEBRIDEMENT OF LOWER EXTREMITY Left 10/31/2018    Procedure: IRRIGATION AND DEBRIDEMENT, LOWER EXTREMITY;  Surgeon: Karla Wheeler DPM;  Location: [facility] OR;  Service: Podiatry;  Laterality: Left;    KIDNEY TRANSPLANT  05/21/2016    LEFT HEART CATHETERIZATION Left 7/21/2019    Procedure: CATHETERIZATION, HEART, LEFT;  Surgeon: Andrew Valdez MD;  Location: [facility] CATH LAB;  Service: Cardiology;  Laterality: Left;    LEG AMPUTATION THROUGH KNEE  2011    right LE, started as nail puncture leading to diabetic ulcer    SKIN FULL THICKNESS GRAFT  Left 10/7/2019    Procedure: APPLICATION, GRAFT, SKIN, FULL-THICKNESS;  Surgeon: Lenard Alarcon MD;  Location: [facility] OR 2ND FLR;  Service: Plastics;  Laterality: Left;    SURGICAL REMOVAL OF LESION OF FACE Right 10/7/2019    Procedure: EXCISION, LESION, FACE;  Surgeon: Lenard Alarcon MD;  Location: [facility] OR 2ND FLR;  Service: Plastics;  Laterality: Right;       Review of patient's allergies indicates:  No Known Allergies    Current Facility-Administered Medications   Medication    doxycycline (VIBRAMYCIN) 100mg in dextrose 5% 250 mL IVPB (ready to mix)    morphine injection 4 mg    ondansetron injection 4 mg     Current Outpatient Medications   Medication Sig    acetaminophen (TYLENOL) 500 MG tablet Take 2 tablets (1,000 mg total) by mouth 3 (three) times daily.    amLODIPine (NORVASC) 10 MG tablet Take 10 mg by mouth once daily.    aspirin (ECOTRIN) 81 MG EC tablet Take 1 tablet (81 mg total) by mouth once daily.    atorvastatin (LIPITOR) 80 MG tablet Take 80 mg by mouth once daily.    carvediloL (COREG) 12.5 MG tablet Take 1 tablet (12.5 mg total) by mouth 2 (two) times daily.    ergocalciferol (ERGOCALCIFEROL) 50,000 unit Cap Take 1 capsule (50,000 Units total) by mouth every 7 days. Take on Mondays    gabapentin (NEURONTIN) 300 MG capsule Take 300 mg by mouth 3 (three) times daily.    linaCLOtide (LINZESS) 72 mcg Cap capsule Take 1 capsule (72 mcg total) by mouth before breakfast.    mycophenolate (CELLCEPT) 250 mg Cap Take 250 mg by mouth 2 (two) times daily.    nitroGLYCERIN (NITROSTAT) 0.4 MG SL tablet Place 1 tablet (0.4 mg total) under the tongue every 5 (five) minutes as needed.    ondansetron (ZOFRAN) 4 MG tablet Take 4 mg by mouth every 8 (eight) hours as needed for Nausea.    oxyCODONE (ROXICODONE) 15 MG Tab Take 10 mg by mouth every 4 (four) hours as needed for Pain.    semaglutide (OZEMPIC) 1 mg/dose (2 mg/1.5 mL) PnIj Inject 1 mg under the skin every 7 days.    sertraline (ZOLOFT) 25 MG tablet Take  25 mg by mouth once daily.    tacrolimus (PROGRAF) 0.5 MG Cap Take 2 capsules (1 mg total) by mouth every 12 (twelve) hours.     Family History       Problem Relation (Age of Onset)    Cancer Father    Diabetes Father    Heart failure Father, Mother    Stroke Father          Tobacco Use    Smoking status: Former     Packs/day: 1.00     Years: 40.00     Pack years: 40.00     Types: Cigarettes     Quit date: 2013     Years since quittin.6    Smokeless tobacco: Never   Substance and Sexual Activity    Alcohol use: Yes     Alcohol/week: 6.0 standard drinks     Types: 6 Cans of beer per week     Comment: seldom    Drug use: No    Sexual activity: Never     Review of Systems   Constitutional: Negative for chills, decreased appetite, fever and weight loss.   HENT:  Negative for congestion, hoarse voice and sore throat.    Eyes:  Negative for blurred vision, double vision, vision loss in left eye and vision loss in right eye.   Cardiovascular:  Negative for chest pain, palpitations and syncope.   Respiratory:  Negative for cough, shortness of breath and wheezing.    Endocrine: Negative for cold intolerance and heat intolerance.   Hematologic/Lymphatic: Negative for bleeding problem. Does not bruise/bleed easily.   Skin:  Negative for dry skin, flushing, itching and suspicious lesions.   Musculoskeletal:  Positive for joint pain and joint swelling.   Gastrointestinal:  Negative for abdominal pain, diarrhea, nausea and vomiting.   Genitourinary:  Negative for dysuria, frequency and urgency.   Neurological:  Negative for dizziness, headaches, numbness and paresthesias.   Psychiatric/Behavioral:  Negative for altered mental status and memory loss.    Objective:     Vital Signs (Most Recent):  Temp: 99 °F (37.2 °C) (22 133)  Pulse: 75 (22 133)  Resp: 18 (22 133)  BP: (!) 114/51 (22 133)  SpO2: 96 % (22)   Vital Signs (24h Range):  Temp:  [99 °F (37.2 °C)-99.1 °F (37.3 °C)] 99 °F  (37.2 °C)  Pulse:  [65-75] 75  Resp:  [16-18] 18  SpO2:  [93 %-96 %] 96 %  BP: (114-117)/(51-86) 114/51           There is no height or weight on file to calculate BMI.    No intake or output data in the 24 hours ending 09/13/22 1712    Ortho/SPM Exam  Left lower extremity:  External fixator is intact   Open wound at the calcaneal pin sites that goes all the way through, see pictures under media tab  Purulent drainage noted on bandages  Calcaneus moves freely back and forth along the pin   Proximal pins in the tibia are unchanged from previous, no signs of infection   Calf and compartments are soft and compressible   Motor exam normal   Sensation and pulses decreased, but that is the patient's baseline    GEN: Well developed, well nourished male. AAOX3. No acute distress.   Head: Normocephalic, atraumatic.   Eyes: [place]  Neck: Trachea is midline, no adenopathy  Resp: Breathing unlabored.  Neuro: Motor function normal, Cranial nerves intact  Psych: Mood and affect appropriate.      Significant Labs: Blood Culture: No results for input(s): LABBLOO in the last 48 hours.  CBC:   Recent Labs   Lab 09/13/22  1407   WBC 9.14   HGB 9.5*   HCT 30.1*        CMP:   Recent Labs   Lab 09/13/22  1627   *   K 4.2   CL 97   CO2 19*   *   BUN 28*   CREATININE 1.8*   CALCIUM 8.3*   PROT 6.3   ALBUMIN 1.9*   BILITOT 0.8   ALKPHOS 280*   AST 66*   ALT 48*   ANIONGAP 14     CRP:   Recent Labs   Lab 09/13/22  1627   .3*     Lactic Acid:   Recent Labs   Lab 09/13/22  1448   LACTATE 0.8     POCT Glucose:   Recent Labs   Lab 09/13/22  1413   POCTGLUCOSE 225*     All pertinent labs within the past 24 hours have been reviewed.    Significant Imaging: X-Ray: I have reviewed all pertinent results/findings and my personal findings are:  X-ray left ankle obtained today shows minimal callus formation of the distal tibia and fibula fractures.  No signs of osteomyelitis he    Assessment/Plan:     There are no hospital  problems to display for this patient.    Assessment:   67-year-old male past medical history significant for hypertension, coronary artery disease, diabetes, peripheral vascular disease, kidney transplant in 2016, COPD, right below knee amputation, left transmetatarsal amputation, and status post closed reduction of left tibial and fibular shaft fractures with external fixation on 08/04/2022 with osteomyelitis of the left calcaneus    Plan:   Patient is being admitted to hospital medicine  Start IV antibiotics  Keep patient NPO after midnight   Nonweightbearing to the left lower extremity   Discussed surgical intervention with the patient.  Recommended taking him to the operating room for removal of the external fixator, debridement of calcaneal osteomyelitis, and cast placement with when does for wound care.  All questions were asked and answered.  Plan to take the patient to the operating room tomorrow around noon    Thank you for your consult. I will follow-up with patient. Please contact us if you have any additional questions.    Tam Mcdermott PA-C  Orthopedics  [facility] - Emergency Dept.

## 2022-09-13 NOTE — Clinical Note
An angiography was performed of the ostial infrarenal abdominal aorta  via power injection. Injection was performed with 20 mL contrast at 15 mL/s.

## 2022-09-13 NOTE — Clinical Note
An angiography of the  left lower extremity selectively was performed with the catheter and power injected with 40 mL contrast at at 4 mL/s.

## 2022-09-13 NOTE — TELEPHONE ENCOUNTER
----- Message from Good Villa MD sent at 9/13/2022  9:42 AM CDT -----  Regarding: Follow-up appointment  Contact: Nurse  The patient left the hospital without evidence of infection at his external fixation device.  The plan was to allow him to recover from his COVID pneumonia and then bring him back as an outpatient to adjust the fixation device to improve fracture alignment.  We should have him come in and see Iftikhar in the clinic today.  ----- Message -----  From: Melba Hamilton LPN  Sent: 9/13/2022   9:17 AM CDT  To: Good Villa MD      ----- Message -----  From: Mitzi Yepez  Sent: 9/13/2022   8:53 AM CDT  To: Hermann Juares Staff    Pt's nurse Jo would like an return call in reference to wound having an smell and also looks infected , they are wanting to know if pt was seen by DR. Mccrary while hospitalized, please call back at [phone] Thx CJ

## 2022-09-13 NOTE — Clinical Note
An angiography was performed of the ostial and distal left superficial femoral artery via hand injection with 5 mL of contrast Runoff  to foot performed.

## 2022-09-13 NOTE — TELEPHONE ENCOUNTER
----- Message from Melba Hamilton LPN sent at 9/13/2022  9:17 AM CDT -----  Contact: Nurse    ----- Message -----  From: Mitzi Yepez  Sent: 9/13/2022   8:53 AM CDT  To: Hermann Juares Staff    Pt's nurse Jo would like an return call in reference to wound having an smell and also looks infected , they are wanting to know if pt was seen by DR. Mccrary while hospitalized, please call back at [phone] Thx CJ
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
## 2022-09-14 NOTE — SUBJECTIVE & OBJECTIVE
Past Medical History:   Diagnosis Date    DINORAH (acute kidney injury) 2016    Arthritis     CAD in native artery 2019    CHF (congestive heart failure)     Chronic obstructive pulmonary disease 2016    Coronary artery disease involving native coronary artery of native heart without angina pectoris 2016    CRI (chronic renal insufficiency) 2019     donor kidney transplant for DM 16     Induction with Thymo x3 and IV solumedrol to total 875mg  Kidney Biopsy  2016: 16 glomeruli, ACR type 1 AVR type 2, significant microcirculatory changes, c4d negative, No DSA, 5 to10% fibrosis. Treated with thymo x8 2016- no rejection      Diastolic heart failure     Encounter for blood transfusion     ESRD on RRT since 10/2013 10/29/2013    Biopsy proven diabetic nephropathy and lymphoplasmacytic interstitial infiltrate not c/w with AIN (ddx sjogrens or assoc with tamm-horsefall protein extravasation)     GERD (gastroesophageal reflux disease)     History of hepatitis C, s/p successful Rx w/ SVR12 - 2017    Completed 12 weeks harvoni w/ SVR    Hyperlipidemia     Hypertension     PAD (peripheral artery disease) 2019    PIC line (peripherally inserted central catheter) flush     Prophylactic immunotherapy     Proteinuria     PVD (peripheral vascular disease) 2017    RLE BKA CT 16 Extensive atherosclerotic disease of the aorta and mesenteric arteries.     Renal hypertension     Type 2 diabetes mellitus with diabetic neuropathy, with long-term current use of insulin 2016    Vitamin B12 deficiency        Past Surgical History:   Procedure Laterality Date    AORTOGRAPHY WITH SERIALOGRAPHY N/A 2018    Procedure: LEFT LEG ANGIOGRAM;  Surgeon: Donal Mcdonald MD;  Location: [facility] CATH LAB;  Service: Vascular;  Laterality: N/A;    APPLICATION OF LARGE EXTERNAL FIXATION DEVICE TO TIBIA Left 2022    Procedure: APPLICATION, EXTERNAL FIXATION DEVICE, LARGE, TIBIA;   Surgeon: Good Villa MD;  Location: [facility] OR;  Service: Orthopedics;  Laterality: Left;    av bovine graft      Left UE    AV FISTULA PLACEMENT      left UE    CARDIAC CATHETERIZATION  02/2015    CLOSED REDUCTION OF INJURY OF TIBIA Left 8/4/2022    Procedure: CLOSED REDUCTION, TIBIA;  Surgeon: Good Villa MD;  Location: [facility] OR;  Service: Orthopedics;  Laterality: Left;  Closed reduction left tibial and fibular shaft fractures    CLOSURE OF WOUND Left 9/24/2018    Procedure: CLOSURE, WOUND;  Surgeon: Karla Wheeler DPM;  Location: [facility] OR;  Service: Podiatry;  Laterality: Left;  Secondary Wound closure, extensive    CLOSURE OF WOUND Left 11/5/2018    Procedure: CLOSURE, WOUND;  Surgeon: Karla Wheeler DPM;  Location: [facility] OR;  Service: Podiatry;  Laterality: Left;    DEBRIDEMENT OF MULTIPLE METATARSAL BONES Left 11/5/2018    Procedure: DEBRIDEMENT, METATARSAL BONE, 2 OR MORE BONES;  Surgeon: Karla Wheeler DPM;  Location: [facility] OR;  Service: Podiatry;  Laterality: Left;    EXCISION OF SKIN Left 9/27/2019    Procedure: EXCISION, SKIN;  Surgeon: Lenard Alarcon MD;  Location: [address];  Service: Plastics;  Laterality: Left;  Plastics set, NIMS monitor, ACell    FOOT AMPUTATION THROUGH METATARSAL Left 9/21/2018    Procedure: AMPUTATION, FOOT, TRANSMETATARSAL;  Surgeon: Karla Wheeler DPM;  Location: [facility] OR;  Service: Podiatry;  Laterality: Left;    FOOT AMPUTATION THROUGH METATARSAL Left 10/31/2018    Procedure: AMPUTATION, FOOT, TRANSMETATARSAL;  Surgeon: Karla Wheeler DPM;  Location: [facility] OR;  Service: Podiatry;  Laterality: Left;    FOOT AMPUTATION THROUGH METATARSAL Left 11/5/2018    Procedure: AMPUTATION, FOOT, TRANSMETATARSAL;  Surgeon: Karla Wheeler DPM;  Location: [facility] OR;  Service: Podiatry;  Laterality: Left;  revisional transmetatarsal amputation, Left foot    IRRIGATION AND DEBRIDEMENT OF LOWER EXTREMITY Left 10/31/2018    Procedure: IRRIGATION AND  DEBRIDEMENT, LOWER EXTREMITY;  Surgeon: Karla Wheeler DPM;  Location: [facility] OR;  Service: Podiatry;  Laterality: Left;    KIDNEY TRANSPLANT  05/21/2016    LEFT HEART CATHETERIZATION Left 7/21/2019    Procedure: CATHETERIZATION, HEART, LEFT;  Surgeon: Andrew Valdez MD;  Location: [facility] CATH LAB;  Service: Cardiology;  Laterality: Left;    LEG AMPUTATION THROUGH KNEE  2011    right LE, started as nail puncture leading to diabetic ulcer    SKIN FULL THICKNESS GRAFT Left 10/7/2019    Procedure: APPLICATION, GRAFT, SKIN, FULL-THICKNESS;  Surgeon: Lenard Alarcon MD;  Location: [address];  Service: Plastics;  Laterality: Left;    SURGICAL REMOVAL OF LESION OF FACE Right 10/7/2019    Procedure: EXCISION, LESION, FACE;  Surgeon: Lenard Alarcon MD;  Location: [facility] OR [address] [place] [address];  Service: Plastics;  Laterality: Right;       Review of patient's allergies indicates:  No Known Allergies    No current facility-administered medications on file prior to encounter.     Current Outpatient Medications on File Prior to Encounter   Medication Sig    acetaminophen (TYLENOL) 500 MG tablet Take 2 tablets (1,000 mg total) by mouth 3 (three) times daily.    amLODIPine (NORVASC) 10 MG tablet Take 10 mg by mouth once daily.    aspirin (ECOTRIN) 81 MG EC tablet Take 1 tablet (81 mg total) by mouth once daily.    atorvastatin (LIPITOR) 80 MG tablet Take 80 mg by mouth once daily.    carvediloL (COREG) 12.5 MG tablet Take 1 tablet (12.5 mg total) by mouth 2 (two) times daily.    ergocalciferol (ERGOCALCIFEROL) 50,000 unit Cap Take 1 capsule (50,000 Units total) by mouth every 7 days. Take on Mondays    gabapentin (NEURONTIN) 300 MG capsule Take 300 mg by mouth 3 (three) times daily.    linaCLOtide (LINZESS) 72 mcg Cap capsule Take 1 capsule (72 mcg total) by mouth before breakfast.    mycophenolate (CELLCEPT) 250 mg Cap Take 250 mg by mouth 2 (two) times daily.    nitroGLYCERIN (NITROSTAT) 0.4 MG SL tablet Place 1 tablet (0.4 mg total)  under the tongue every 5 (five) minutes as needed.    ondansetron (ZOFRAN) 4 MG tablet Take 4 mg by mouth every 8 (eight) hours as needed for Nausea.    oxyCODONE (ROXICODONE) 15 MG Tab Take 10 mg by mouth every 4 (four) hours as needed for Pain.    semaglutide (OZEMPIC) 1 mg/dose (2 mg/1.5 mL) PnIj Inject 1 mg under the skin every 7 days.    sertraline (ZOLOFT) 25 MG tablet Take 25 mg by mouth once daily.    tacrolimus (PROGRAF) 0.5 MG Cap Take 2 capsules (1 mg total) by mouth every 12 (twelve) hours.    [DISCONTINUED] cloNIDine (CATAPRES) 0.1 MG tablet Take 1 tablet (0.1 mg total) by mouth 3 (three) times daily. (Patient not taking: Reported on 2022)    [DISCONTINUED] furosemide (LASIX) 40 MG tablet Take 1 tablet (40 mg total) by mouth daily as needed (Leg swelling, Nocturnal SOB).    [DISCONTINUED] ipratropium (ATROVENT) 0.02 % nebulizer solution Take 2.5 mLs (500 mcg total) by nebulization 4 (four) times daily. (Patient not taking: Reported on 2022)    [DISCONTINUED] isosorbide mononitrate (IMDUR) 30 MG 24 hr tablet Take 2 tablets (60 mg total) by mouth once daily. (Patient not taking: Reported on 2022)    [DISCONTINUED] ketoconazole (NIZORAL) 2 % cream Apply topically 2 (two) times daily.    [DISCONTINUED] levalbuterol (XOPENEX) 1.25 mg/3 mL nebulizer solution Take 3 mLs (1.25 mg total) by nebulization every 6 to 8 hours as needed for Wheezing or Shortness of Breath.    [DISCONTINUED] metoprolol tartrate (LOPRESSOR) 25 MG tablet Take 1 tablet (25 mg total) by mouth 2 (two) times daily.     Family History       Problem Relation (Age of Onset)    Cancer Father    Diabetes Father    Heart failure Father, Mother    Stroke Father          Tobacco Use    Smoking status: Former     Packs/day: 1.00     Years: 40.00     Pack years: 40.00     Types: Cigarettes     Quit date: 2013     Years since quittin.6    Smokeless tobacco: Never   Substance and Sexual Activity    Alcohol use: Yes      Alcohol/week: 6.0 standard drinks     Types: 6 Cans of beer per week     Comment: seldom    Drug use: No    Sexual activity: Never     Review of Systems   Constitutional:  Negative for fatigue and fever.   HENT:  Negative for sinus pressure.    Eyes:  Negative for visual disturbance.   Respiratory:  Negative for shortness of breath.    Cardiovascular:  Negative for chest pain.   Gastrointestinal:  Negative for nausea and vomiting.   Genitourinary:  Negative for difficulty urinating.   Musculoskeletal:  Negative for back pain.        Left ankle pain   Skin:  Negative for rash.   Neurological:  Negative for headaches.   Psychiatric/Behavioral:  Negative for confusion.    Objective:     Vital Signs (Most Recent):  Temp: 99.2 °F (37.3 °C) (09/13/22 1900)  Pulse: 68 (09/13/22 2028)  Resp: 18 (09/13/22 1910)  BP: 110/68 (09/13/22 2028)  SpO2: 96 % (09/13/22 1900) Vital Signs (24h Range):  Temp:  [98.4 °F (36.9 °C)-99.2 °F (37.3 °C)] 99.2 °F (37.3 °C)  Pulse:  [65-75] 68  Resp:  [16-20] 18  SpO2:  [93 %-96 %] 96 %  BP: (106-117)/(51-86) 110/68     Weight: 102 kg (224 lb 13.9 oz)  Body mass index is 31.36 kg/m².    Physical Exam  Constitutional:       General: He is not in acute distress.     Appearance: He is well-developed. He is obese. He is not diaphoretic.   HENT:      Head: Normocephalic and atraumatic.   Eyes:      Pupils: Pupils are equal, round, and reactive to light.   Cardiovascular:      Rate and Rhythm: Normal rate and regular rhythm.      Heart sounds: Normal heart sounds. No murmur heard.    No friction rub. No gallop.   Pulmonary:      Effort: Pulmonary effort is normal. No respiratory distress.      Breath sounds: Normal breath sounds. No stridor. No wheezing or rales.   Abdominal:      General: Bowel sounds are normal. There is no distension.      Palpations: Abdomen is soft. There is no mass.      Tenderness: There is no abdominal tenderness. There is no guarding.   Musculoskeletal:      Comments: Left  ortho hardware in place, no drainage noted; right bka   Skin:     General: Skin is warm.      Findings: No erythema.   Neurological:      Mental Status: He is alert and oriented to person, place, and time.         CRANIAL NERVES     CN III, IV, VI   Pupils are equal, round, and reactive to light.     Significant Labs:   Results for orders placed or performed during the hospital encounter of 09/13/22   CBC auto differential   Result Value Ref Range    WBC 9.14 3.90 - 12.70 K/uL    RBC 3.29 (L) 4.60 - 6.20 M/uL    Hemoglobin 9.5 (L) 14.0 - 18.0 g/dL    Hematocrit 30.1 (L) 40.0 - 54.0 %    MCV 92 82 - 98 fL    MCH 28.9 27.0 - 31.0 pg    MCHC 31.6 (L) 32.0 - 36.0 g/dL    RDW 14.4 11.5 - 14.5 %    Platelets 228 150 - 450 K/uL    MPV 10.2 9.2 - 12.9 fL    Immature Granulocytes 0.3 0.0 - 0.5 %    Gran # (ANC) 6.9 1.8 - 7.7 K/uL    Immature Grans (Abs) 0.03 0.00 - 0.04 K/uL    Lymph # 1.1 1.0 - 4.8 K/uL    Mono # 1.2 (H) 0.3 - 1.0 K/uL    Eos # 0.0 0.0 - 0.5 K/uL    Baso # 0.01 0.00 - 0.20 K/uL    nRBC 0 0 /100 WBC    Gran % 75.2 (H) 38.0 - 73.0 %    Lymph % 11.5 (L) 18.0 - 48.0 %    Mono % 12.8 4.0 - 15.0 %    Eosinophil % 0.1 0.0 - 8.0 %    Basophil % 0.1 0.0 - 1.9 %    Differential Method Automated    Lactic acid, plasma   Result Value Ref Range    Lactate (Lactic Acid) 0.8 0.5 - 2.2 mmol/L   C-reactive protein   Result Value Ref Range    .3 (H) 0.0 - 8.2 mg/L   Comprehensive metabolic panel   Result Value Ref Range    Sodium 130 (L) 136 - 145 mmol/L    Potassium 4.2 3.5 - 5.1 mmol/L    Chloride 97 95 - 110 mmol/L    CO2 19 (L) 23 - 29 mmol/L    Glucose 209 (H) 70 - 110 mg/dL    BUN 28 (H) 8 - 23 mg/dL    Creatinine 1.8 (H) 0.5 - 1.4 mg/dL    Calcium 8.3 (L) 8.7 - 10.5 mg/dL    Total Protein 6.3 6.0 - 8.4 g/dL    Albumin 1.9 (L) 3.5 - 5.2 g/dL    Total Bilirubin 0.8 0.1 - 1.0 mg/dL    Alkaline Phosphatase 280 (H) 55 - 135 U/L    AST 66 (H) 10 - 40 U/L    ALT 48 (H) 10 - 44 U/L    Anion Gap 14 8 - 16 mmol/L    eGFR  40 (A) >60 mL/min/1.73 m^2   Procalcitonin   Result Value Ref Range    Procalcitonin 0.38 (H) <0.25 ng/mL   POCT glucose   Result Value Ref Range    POCT Glucose 225 (H) 70 - 110 mg/dL     *Note: Due to a large number of results and/or encounters for the requested time period, some results have not been displayed. A complete set of results can be found in Results Review.        Significant Imaging: X-Ray Ankle Complete Left  Narrative: EXAMINATION:  XR ANKLE COMPLETE 3 VIEW LEFT    CLINICAL HISTORY:  Other acute postprocedural pain    COMPARISON:  Comparison was made to plain film examinations of the left ankle dating back to 08/04/2022.    FINDINGS:  There is minimal callus formation across the fractures in the distal aspect of the tibia.  There is an external fixator across the fractures of the tibia.  There is a mild amount of callus formation across a fracture in the distal portion of the fibula.  There is no dislocation.  There is no radiographic evidence of acute osteomyelitis.  There are surgical changes associated with a prior amputation of the left forefoot.  Impression: 1. There is no radiographic evidence of acute osteomyelitis.  2. There is minimal callus formation across the fractures in the distal aspect of the tibia. There is an external fixator across the fractures of the tibia.  3. There is a mild amount of callus formation across a fracture in the distal portion of the fibula.    Electronically signed by: Porter Joseph MD  Date:    09/13/2022  Time:    15:36

## 2022-09-14 NOTE — ASSESSMENT & PLAN NOTE
Patient's FSGs are uncontrolled due to hyperglycemia on current medication regimen.  Last A1c reviewed-   Lab Results   Component Value Date    HGBA1C 7.4 (H) 08/04/2022     Most recent fingerstick glucose reviewed-   Recent Labs   Lab 09/13/22  1413   POCTGLUCOSE 225*     Current correctional scale  Low  Maintain anti-hyperglycemic dose as follows-   Antihyperglycemics (From admission, onward)    Start     Stop Route Frequency Ordered    09/13/22 2120  insulin aspart U-100 pen 0-5 Units         -- SubQ Before meals & nightly PRN 09/13/22 2023        Hold Oral hypoglycemics while patient is in the hospital.

## 2022-09-14 NOTE — CONSULTS
Pharmacokinetic Assessment Follow Up: IV Vancomycin    Vancomycin serum concentration assessment(s):    The random level was drawn correctly and can be used to guide therapy at this time. The measurement is within the desired definitive target range of 15 to 20 mcg/mL.    Vancomycin Regimen Plan:    Pulse dosing patient with Vancomycin 1500 mg IV once with next serum random concentration measured at 0900 prior to next dose on 9/15    Drug levels (last 3 results):  Recent Labs   Lab Result Units 09/14/22  1150   Vancomycin, Random ug/mL 16.9       Pharmacy will continue to follow and monitor vancomycin.    Please contact pharmacy at extension [phone]   for questions regarding this assessment.     Patient brief summary:  Lavelle Ladd is a 67 y.o. male initiated on antimicrobial therapy with IV Vancomycin for treatment of bone/joint infection    The patient's current regimen is 1500 mg once (pulse dosed at 15 mg/kg)    Drug Allergies:   Review of patient's allergies indicates:  No Known Allergies    Actual Body Weight:   102 kg    Renal Function:   Estimated Creatinine Clearance: 53 mL/min (A) (based on SCr of 1.6 mg/dL (H)).,     Dialysis Method (if applicable):  N/A    CBC (last 72 hours):  Recent Labs   Lab Result Units 09/13/22  1407 09/14/22  0439   WBC K/uL 9.14 8.08   Hemoglobin g/dL 9.5* 9.7*   Hematocrit % 30.1* 31.5*   Platelets K/uL 228 247   Gran % % 75.2* 72.8   Lymph % % 11.5* 12.0*   Mono % % 12.8 14.2   Eosinophil % % 0.1 0.5   Basophil % % 0.1 0.1   Differential Method  Automated Automated       Metabolic Panel (last 72 hours):  Recent Labs   Lab Result Units 09/13/22  1627 09/14/22  0439   Sodium mmol/L 130* 126*   Potassium mmol/L 4.2 4.3   Chloride mmol/L 97 95   CO2 mmol/L 19* 21*   Glucose mg/dL 209* 255*   BUN mg/dL 28* 25*   Creatinine mg/dL 1.8* 1.6*   Albumin g/dL 1.9* 1.8*   Total Bilirubin mg/dL 0.8 0.9   Alkaline Phosphatase U/L 280* 275*   AST U/L 66* 55*   ALT U/L 48* 44        Vancomycin Administrations:  vancomycin given in the last 96 hours                     vancomycin 1.5 g in dextrose 5 % 250 mL IVPB (ready to mix) (mg) 1,500 mg New Bag 09/14/22 1506    vancomycin 2 g in dextrose 5 % 500 mL IVPB (mg) 2,000 mg New Bag 09/13/22 7057                    Microbiologic Results:  Microbiology Results (last 7 days)       Procedure Component Value Units Date/Time    Blood Culture #1 **CANNOT BE ORDERED STAT** [[id number]] Collected: 09/13/22 1434    Order Status: Completed Specimen: Blood from Peripheral, Forearm, Right Updated: 09/14/22 0515     Blood Culture, Routine No Growth to date    Blood Culture #2 **CANNOT BE ORDERED STAT** [[id number]] Collected: 09/13/22 1447    Order Status: Completed Specimen: Blood from Wrist, Right Updated: 09/14/22 0515     Blood Culture, Routine No Growth to date

## 2022-09-14 NOTE — ASSESSMENT & PLAN NOTE
9/14/22:Surgery was post-poned 2/2 hyponatremia -Na 126. Corrected Na to glucose is 129. . CXR- some cephalization. Abd u/s showed- cirrhosis changes to liver. Hyponatremia likely 2/2 hypervolemia and Cirrhosis. Will add IV lasix. Add Levemir for hyperglycemia. Tacrolimus level 10- will consult nephrology for renal transplant and hyponatremia

## 2022-09-14 NOTE — PROGRESS NOTES
The patient's surgery has been postponed.    The patient came down to the preop area and was evaluated by Anesthesiology.  Found to have sodium of 126, glucose 255.  It was determined that this put the patient at greater risk for anesthesia complications.  I agreed with the anesthesiologist and the case has been postponed.  Explained the nature of electrolyte imbalance to the patient.      I have spoken with Dr. Zazueta who is on-call tomorrow.  She will assume the patient's care as needed for this procedure.

## 2022-09-14 NOTE — ASSESSMENT & PLAN NOTE
Ortho consulted  Plans for surgical intervention   NPO after MN  Empiric vanc and cefepime  Hardware in place  Blood cultures pending      9/14/22: c/o of left ankle pain-controlled. Pt was scheduled for surgery, however, surgery was post-poned 2/2 hyponatremia -Na 126. WBC normal, afebrile. Blood cultures show NGTD. .3. cont IV Abx

## 2022-09-14 NOTE — ASSESSMENT & PLAN NOTE
Hold cellcept due to active infection  Cont tacrolimus  Check tac level    9/14/22: Consult nephrology

## 2022-09-14 NOTE — CONSULTS
[facility] - Telemetry ([facility])  Wound Care    Patient Name:  Lavelle Ladd   MRN:  [id number]  Date: 2022  Diagnosis: <principal problem not specified>    History:     Past Medical History:   Diagnosis Date    DINORAH (acute kidney injury) 2016    Arthritis     CAD in native artery 2019    CHF (congestive heart failure)     Chronic obstructive pulmonary disease 2016    Coronary artery disease involving native coronary artery of native heart without angina pectoris 2016    CRI (chronic renal insufficiency) 2019     donor kidney transplant for DM 16     Induction with Thymo x3 and IV solumedrol to total 875mg  Kidney Biopsy  2016: 16 glomeruli, ACR type 1 AVR type 2, significant microcirculatory changes, c4d negative, No DSA, 5 to10% fibrosis. Treated with thymo x8 2016- no rejection      Diastolic heart failure     Encounter for blood transfusion     ESRD on RRT since 10/2013 10/29/2013    Biopsy proven diabetic nephropathy and lymphoplasmacytic interstitial infiltrate not c/w with AIN (ddx sjogrens or assoc with tamm-horsefall protein extravasation)     GERD (gastroesophageal reflux disease)     History of hepatitis C, s/p successful Rx w/ SVR12 - 2017    Completed 12 weeks harvoni w/ SVR    Hyperlipidemia     Hypertension     PAD (peripheral artery disease) 2019    PIC line (peripherally inserted central catheter) flush     Prophylactic immunotherapy     Proteinuria     PVD (peripheral vascular disease) 2017    RLE BKA CT 16 Extensive atherosclerotic disease of the aorta and mesenteric arteries.     Renal hypertension     Type 2 diabetes mellitus with diabetic neuropathy, with long-term current use of insulin 2016    Vitamin B12 deficiency        Social History     Socioeconomic History    Marital status: Single   Occupational History    Occupation: Disabled     Employer: DISABLED   Tobacco Use    Smoking status: Former     Packs/day:  "1.00     Years: 40.00     Pack years: 40.00     Types: Cigarettes     Quit date: 2013     Years since quittin.6    Smokeless tobacco: Never   Substance and Sexual Activity    Alcohol use: Yes     Alcohol/week: 6.0 standard drinks     Types: 6 Cans of beer per week     Comment: seldom    Drug use: No    Sexual activity: Never   Social History Narrative    Lives alone. Retired from construction and various jobs, now retired. Would like to return to work PT to alleviate boredom.       Precautions:     Allergies as of 2022    (No Known Allergies)       [facility] Assessment Details/Treatment     Consulted on Mr. Ladd due to present on admission wounds related to LLE external fixator. Per chart reivew, patient scheduled for surgery with Dr. Villa today for this issue. He does have a history of stage 3 pressure injury to left posterior greater trochanter region on prior admit last month. Attempted to complete consult for this wound along with MAYKEL Chavira. When entered room, patient requested a urinal, then proceeded to request that we leave him alone to urinate. After 5 mintues, reattempted assessment of patient, and he yelled to "please just leave me alone". At this point, visit ended. Primary nurse also aware of patient refusal for assessment. Will follow for potential post-op wound care needs. Recommend pressure injury prevention measures - turn q2 hours, float heels.     2022    "
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
## 2022-09-14 NOTE — SUBJECTIVE & OBJECTIVE
Interval History: see hospital course    Review of Systems   Constitutional:  Positive for activity change and fatigue. Negative for appetite change, chills, diaphoresis and fever.   HENT:  Negative for congestion, nosebleeds, sore throat and trouble swallowing.    Eyes:  Negative for pain, discharge and visual disturbance.   Respiratory:  Negative for apnea, cough, chest tightness, shortness of breath, wheezing and stridor.    Cardiovascular:  Negative for chest pain, palpitations and leg swelling.   Gastrointestinal:  Negative for abdominal distention, abdominal pain, blood in stool, constipation, diarrhea, nausea and vomiting.   Endocrine: Negative for cold intolerance and heat intolerance.   Genitourinary:  Negative for difficulty urinating, dysuria, flank pain, frequency and urgency.   Musculoskeletal:  Positive for arthralgias (left ankle pain), gait problem and joint swelling. Negative for back pain, myalgias, neck pain and neck stiffness.   Skin:  Negative for rash and wound.   Allergic/Immunologic: Negative for food allergies and immunocompromised state.   Neurological:  Negative for dizziness, seizures, syncope, facial asymmetry, weakness, light-headedness and headaches.   Hematological:  Negative for adenopathy.   Psychiatric/Behavioral:  Negative for agitation, behavioral problems and confusion. The patient is not nervous/anxious.    Objective:     Vital Signs (Most Recent):  Temp: 98.5 °F (36.9 °C) (09/14/22 1539)  Pulse: 79 (09/14/22 1539)  Resp: 17 (09/14/22 1539)  BP: 128/71 (09/14/22 1539)  SpO2: (!) 93 % (09/14/22 1539)   Vital Signs (24h Range):  Temp:  [97.4 °F (36.3 °C)-100.3 °F (37.9 °C)] 98.5 °F (36.9 °C)  Pulse:  [66-91] 79  Resp:  [16-20] 17  SpO2:  [81 %-97 %] 93 %  BP: ()/(49-71) 128/71     Weight: 102 kg (224 lb 13.9 oz)  Body mass index is 31.36 kg/m².    Intake/Output Summary (Last 24 hours) at 9/14/2022 1715  Last data filed at 9/14/2022 0721  Gross per 24 hour   Intake 546.34 ml    Output --   Net 546.34 ml      Physical Exam  Vitals and nursing note reviewed.   Constitutional:       General: He is not in acute distress.     Appearance: He is well-developed. He is not diaphoretic.   HENT:      Head: Normocephalic and atraumatic.      Nose: Nose normal.   Eyes:      General: No scleral icterus.     Conjunctiva/sclera: Conjunctivae normal.   Cardiovascular:      Rate and Rhythm: Normal rate and regular rhythm.      Heart sounds: Normal heart sounds. No murmur heard.    No friction rub. No gallop.   Pulmonary:      Effort: Pulmonary effort is normal. No respiratory distress.      Breath sounds: Normal breath sounds. No stridor. No wheezing or rales.   Chest:      Chest wall: No tenderness.   Abdominal:      General: Bowel sounds are normal. There is no distension.      Palpations: Abdomen is soft.      Tenderness: There is no abdominal tenderness. There is no guarding or rebound.   Musculoskeletal:         General: No tenderness or deformity. Normal range of motion.      Cervical back: Normal range of motion and neck supple.      Comments: Left TMA with external fixator in place to distal Tib/fib.    Pin site with open wound and foul drainage   Right BKA   Skin:     General: Skin is warm and dry.      Coloration: Skin is not pale.      Findings: No erythema or rash.   Neurological:      Mental Status: He is alert and oriented to person, place, and time.      Cranial Nerves: No cranial nerve deficit.      Motor: No abnormal muscle tone.      Coordination: Coordination normal.   Psychiatric:         Behavior: Behavior normal.         Thought Content: Thought content normal.         Significant Labs: All pertinent labs within the past 24 hours have been reviewed.    Significant Imaging: I have reviewed all pertinent imaging results/findings within the past 24 hours.

## 2022-09-14 NOTE — ASSESSMENT & PLAN NOTE
Ortho consulted  Plans for surgical intervention   NPO after MN  Empiric vanc and cefepime  Hardware in place  Blood cultures pending

## 2022-09-14 NOTE — HOSPITAL COURSE
The patient is a 68 yo male with HTN, CAD, DM, PVD, kidney transplant 2016-now with CKD3, COPD, Right BKA, Left transmetatarsal amputation, and recent Closed Fracture pf left tibia/fibula s/p closed reduction left tibial and fibular shaft fractures with application of external fixation device on 8/1/22 who was admitted with Left ankle wound infection at ext-fix pin site with calcaneus osteomyelitis on IV Vanc and Cefepime. Orthopedics was consulted and recommended surgery in am     9/14/22: c/o of left ankle pain-controlled. Pt was scheduled for surgery, however, surgery was post-poned 2/2 hyponatremia -Na 126. Corrected Na to glucose is 129. . CXR- some cephalization. Abd u/s showed- cirrhosis changes to liver. Hyponatremia likely 2/2 hypervolemia and Cirrhosis. Will add IV lasix. Add Levemir for hyperglycemia tacrolimus level 10- will consult nephrology for renal transplant and hyponatremia   WBC normal, afebrile. Blood cultures show NGTD. .3. On 9/15/22, pt scheduled for removal of the external fixator and debridement of osteomyelitis however, procedure postponed due to hyponatremia- Na of 132 (corrected) and Lasix given- repeat analysis in am. IV antibiotics continued. LFTs elevated with Statin held. Orthopedic Surgery and Nephrology following.     9/16/22 - Again surgery held. Pt with hyperglycemia 311, 228, 262. Gentle IV fluids given for approx 5 hours then stopped. Corrected sodium for hyperglycemia = 134. Detemir changed to bid and moderate sliding scale initiated. Still on ABX for foot infection. Surgical intervention is pending.     9/17/22 - Potassium 3.4 - replaced. Creatinine 1.7, glucose 220. S/P: Removal of external fixator  Saucerization of calcaneal osteomyelitis and I&D of hindfoot  Placement of antibiotic beads  4.  Wound vac placement to leg  5.  Fluoroscopy exam under anesthesia demonstrating unhealed distal 1/3 tibia/fibula fractures - gross motion at fracture site   Seen and  "examined after return from surgery. Pt denies any pain currently. Wound to left foot with wound vac. Surgeon found presumed left calcaneal osteo, severe pin site infection    9/18/22 - Post op day 1 - According to ortho pt likely needs a BKA. Pt not amenable at this time. Wound cultures taken during surgery yesterday. A PICC line was ordered for right arm only after confirmed with Renal. Zyvox and Cefepime in progress.   Nephrology is following as patient has hx of renal transplant. Last creatinine 1.7 with GFR 43. Gluc 296. DVT prophylaxis started 24 hours post surgical procedure.   9/19/22 - post op day 2. Wound cultures noted presumptive proteus. Cefepime continued and Zyvox stopped. Pain reported as "7" to left foot area. Pt is NWB and Wound Vac changes (wound care consulted). Arterial US pending as surgical dressing to LLE not removed yet. Ortho states that pt will most likely need a BKA.   9/20/22 - post op day 3. Pt describes pain to the left foot wound area. Also, discussed need to participate with PT/OT so we are able to place him in skilled facility. Pt encouraged some type of participation despite NWB to the left foot. Glucose better control today. Slight increase in serum creatinine 1.7 >> 2.2 and Nephrology stopped lasix diuresis and giving some gentle iV fluids. Aerobic wound culture from surgery isolated pansensitive proteus mirabilis. Blood cultures NGTD. Potassium replaced.   9/21/22 - Arterial studies to RLE noted with hemodynamically significant stenosis suspected within the proximal superficial femoral artery. Vascular consulted for possible angiography. Wound Vac dressing was changed Wed 9/20/22. Aerobic culture - pansensitive mirabilis. Anaerobic culture - Bacteroides faecis. Cefepime >>> Unasyn. Per Nephrology - off lasix, gentle IV fluids given yesterday. Creatinine 2.0. Infectious Ds consulted to assist with ABX selection.   9/22/22 - Pt with severe left sided abdominal pain this AM. " Tenderness to palpation with 3 to 4 episodes of bilious emesis. CT of ABD/Pelvis without contrast was negative for acute findings. Possibly gas and simethicone ordered. Pain resolved and no further vomiting. He refuses to wear the cardiac monitor. Nephrology signed off but if patient having angiogram ensure adequate hydration pre/post procedure. No further lasix diuresis and creatinine 1.6. Vascular plans to perform angiogram to LLE on 9/23/22. ID saw the patient and recommended 6 weeks of Rocephin and Flagyl.   9/23 creatinine improved. Awaiting angio per vascular surgery. Infectious disease consulted for intravenous antibiotic(s). Picc line placed.  9/24/22 The patient had a rapid response called over night. His blood glucose dropped to <20. He was given an amp of D50 and he returned to baseline. Today he was noted to have a K+ 2.8 and Mg 1.4, Will replete. Vascular surgery was unable to complete the angiogram yesterday d/t refusing it. Will await further recs by Vascular surgery.   9/25/22 No acute events overnight. The patient continues to endorse abdominal pain and reports intermittent diarrhea. Will check ABD x-ray and add questran and probiotics. K+ is improved today. Ortho is recommending a BKA per vascular surgery. Awaiting Vascular surgery recs. Continue current management with IV ABX.   9/26/22 No acute events overnight. The patient is alert and oriented today. He reports that he does not remember refusing the angiogram on 9/23/22. He reports that he is agreeable to the procedure. Vascular Surgery was reconsulted today. Ortho is following. Continue IV ABX. Wound vac is in place. K+ 5.6 today, will give a dose of lokelma.   9/27/22 No acute events overnight. The patients K+ improved to 5.2 after lokelma. Vascular surgery reconsulted and plans to do angiogram on 9/29/22 to evaluate for reperfusion vs amputation. Ortho is following.   9/28/22 No acute events overnight. K+ improved after lokelma, 5.1 today.  "Vascular surgery plans for a LLE angiogram in AM. NPO after MN. Ortho following   9/29/22 No acute events overnight. The patients renal function improved with IVF's. Plans for angiogram of LLE today with Vascular surgery to determine if revascularization is possible or if the patient will need a BKA. Will consult PT/OT for recs to assist with placement.   9/30/22: IV fluids D/C, Renal function improved. [place] updated on patient progress, submitted for authorization. Plan to return to [place] when auth received. Plan is to complete 6w of antibiotic therapy, and continue wound care, per Vascular Surgery, if clinically worsens or does not improve, next step is amputation.   10/1: See by Hospital , patient refused to continue with this service.  10/2: Patient seen by orthopedic yesterday, recommend amputation, patient is upset about having to have another amputation, discussed with him, he recognizes the infection is not improving and will likely not improve without amputation. Will be planned this week per vascular surgery.   10/3: Vascular Surgery to schedule amputation for this week, awaiting confirmation of day and time. Pain well controlled at this time, vitals stable, CBG controlled. Most recent tacro level: 5.8, on 10/1, weekly evaluation.   10/4- BKA planned for 10/6- orders placed for NPO and no heparin after midnight 10/5, consulted CM to work on placement following, patient withdrawn and uncooperative with exam today, issues with wound vac beeping and reading "leak detected", recommended RN get in touch with wound care for resolution.  10/05/2022- Sitting up in bed today, cooperative with exam, states no one has come to change his wound vac- notes reveal patient has been refusing changes, discussed plan for surgery tomorrow, is eager to have it done and "move on", CM will work on placement following first PT/OT evaluation, patient will need SNF following, NPO and d/c heparin after midnight   10/6: BKA completed " today, following procedure patient transferred to ICU due to hypotension, placed on levophed drip. When gathering his personal belongings from his room, nurse found a small bag with 6 pills, pills were identified as Norco's. Patient will be required to swallow all pills in front of the nurse for the rest of this hospitalization.   10/7:Examination of patient at bedside, patient and low mood, failure to thrive, denied to participate in therapies at times.  Status post BKA as of 10/06/2022- became hypotensive and has been transferred to ICU on Levophed.  Currently on Levophed. Hemoglobin dropped down to 7.7, 1 unit of PRBC ordered.  Follow up on repeat labs.    Noted to have decreased urine output, creatinine trending up, nephrology notified, follow-up on recommendations.    Given low mood, failure to thrive, denying therapies-ordered psychiatry consultant follow-up on recommendations.    Afebrile, no leukocytosis, status post BKA, received antibiotics for total duration of 25 days- discussed with ID --> considering BKA Dr. Veliz recommended to discontinue antibiotics.   CM working on SNF placement;   After transfusion, IVF--> if BP stabilizes possible downgrade to floor;   10/8- pt seen and examined in the ICU, POD 2 s/p L BKA, lethargic, mostly delirious but did c/o pain at the surgery site. ICU attempting to wean Levophed off by adding Midodrine. H/H 8.4/25. Persistent Metabolic Acidosis, HCO3 13, Nephrology following. Ativan and Norco d/adela, ICU trying Dilaudid due to DINORAH.   10/9- Appreciate ICU and Nephrology- looks better, confused but improving, getting gentle hydration, Cr only 3.7, Prograf at 9.9- Dr. Masterson wants to continue it. BP a little better, hence on less Levophed today, continuing Midodrine and Florinef. Urine output low as well but it appears that he is slowly making progress. WBC 13.05, H/H 9.3/31, Na 132, HCO3 12, Cr 3.7.   10/10- looks and feels much better this am, good response to Solucortef,  was able to come off levophed gtt. AAOx3, more lucid and able to have a conversation, does not want HD, met with Palliative Med as well. Concerned about phantom pain LLE. I also had a detailed d/w with his sister, GLORIA. Bun/Cr 29/4.0, HCO3 14, H/H 8.7/29. Ok to transfer out of ICU.   10/11- mostly delirious again, Bun/Cr worsening despite IVF- hence IVF d/adela and pt given IV lasix 100 mg by Dr. Masterson. HD not yet indicated but unsure if pt wants HD. Had psych eval this- non specific Delirium. Steroids reduced. Bun/cr 35/4.5, CO2 16. Prognosis guarded to poor. Will d/w pt and his sister again about HD vs comfort care again.   10/12- seen and examined with Dr. Masterson- great response to IV Lasix yesterday as well as with grullon pulled, he put out 1 L of urine. Today he is lucid, no confusion, AAOx4, answering all questions appropriately, denies any pain LLE, he ate BF and lunch. He categorically refused/declined HD again if his kidney function deteriorates further, Bun/Cr 41/4.4 today, HCO3 18, H/H 8.9/29.   10/13- looks much better, almost fully oriented, talking with his sister Kecia and inquiring about POC re home. Pain under control. Good Urine output- 1.3 L, Bun/Cr down to 38/3.6. HCO3 20, H/H 9.2/29. await SNF placement, cont PT/OT.   10/14- looks and feels much better, lying comfortably, states he is tired, weary from the PT but otherwise in good spirits, eating drinking well, continues to have great urine output and Cr down to 2.5. await [facility] placement.    10/15 Continue current treatment. Awaiting placement.   10/16/22 - CBC stable, CMP notes stable electrolytes and creatinine back to baseline 1.4.  Amputation site with staples intact. Pt working with PT/OT. Awaiting placement to [facility].     10/17/22 - Authorization for [facility] placement was finalized. Pt was medically stable. He should have staples removed in approx 1 week from left BKA. Pt to discharge to [place] to continue PT/OT. Pt was seen and examined and  determined to be safe and stable for discharge.
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
## 2022-09-14 NOTE — ASSESSMENT & PLAN NOTE
Patient's FSGs are uncontrolled due to hyperglycemia on current medication regimen.  Last A1c reviewed-   Lab Results   Component Value Date    HGBA1C 7.4 (H) 08/04/2022     Most recent fingerstick glucose reviewed-   Recent Labs   Lab 09/14/22  0530 09/14/22  1540   POCTGLUCOSE 263* 222*     Current correctional scale  Low  Maintain anti-hyperglycemic dose as follows-   Antihyperglycemics (From admission, onward)    Start     Stop Route Frequency Ordered    09/14/22 1315  insulin detemir U-100 pen 20 Units         -- SubQ Daily 09/14/22 1302    09/13/22 2120  insulin aspart U-100 pen 0-5 Units         -- SubQ Before meals & nightly PRN 09/13/22 2023        Levemir added- will need to titrate  Hold Oral hypoglycemics while patient is in the hospital.

## 2022-09-14 NOTE — PLAN OF CARE
[facility] - Telemetry (Hospital)  Initial Discharge Assessment       Primary Care Provider: Primary Doctor No    Admission Diagnosis: Post-operative pain [G89.18]  History of kidney transplant [Z94.0]  Chest pain [R07.9]  Hx of right BKA [Z89.511]  Wound infection complicating hardware, subsequent encounter [T84.7XXD]  Acute osteomyelitis of left calcaneus [M86.172]  Osteomyelitis of left ankle, unspecified type [M86.9]  Closed fracture of left tibia and fibula with routine healing, subsequent encounter [S82.202D, S82.402D]  Immunocompromised state due to drug therapy [D84.821, Z79.899]  Infection of deep incisional surgical site after procedure, initial encounter [T81.42XA]    Admission Date: 9/13/2022  Expected Discharge Date:     Discharge Barriers Identified: None    Payor: HUMANA MANAGED MEDICARE / Plan: HUMANA TOTAL CARE ADVANTAGE / Product Type: Medicare Advantage /     Extended Emergency Contact Information  Primary Emergency Contact: Kecia Sagastume   [place]  Mobile Phone: [phone]  Relation: Sister    Discharge Plan A: Skilled Nursing Facility         [id number] - [place] - [address]  [address]  [place] [address]  Phone: [phone] Fax: [phone]    [facility] (New Address) - [address] AT Previously: [address]  [address] 4th Floor [address]  [facility] [address]  Phone: [phone] Fax: [phone]    [facility] (Now [facility]) - [place], [place] - [address] [facility]  [address] [facility] [address]  Phone: [phone] Fax: [phone]    [facility] - [place] - [address]  [address]  [place] [address]  Phone: [phone] Fax: [phone]      Initial Assessment (most recent)       Adult Discharge Assessment - 09/14/22 1340          Discharge Assessment     Assessment Type Discharge Planning Assessment     Confirmed/corrected address, phone number and insurance Yes     Confirmed Demographics Correct on Facesheet     Source of Information patient     Communicated LISETH with patient/caregiver Date not available/Unable to determine     Reason For Admission Acute osteomyelitis of left calcaneus     Lives With significant other     Facility Arrived From: [facility]     Do you expect to return to your current living situation? Yes     Do you have help at home or someone to help you manage your care at home? Yes     Who are your caregiver(s) and their phone number(s)? significant other and facility staff     Prior to hospitilization cognitive status: Alert/Oriented     Current cognitive status: Alert/Oriented     Walking or Climbing Stairs Difficulty ambulation difficulty, requires equipment     Mobility Management WC     Dressing/Bathing Difficulty bathing difficulty, requires equipment     Dressing/Bathing Management shower chair     Home Accessibility wheelchair accessible     Home Layout Able to live on 1st floor     Equipment Currently Used at Home grab bar;wheelchair;bedside commode;shower chair     Readmission within 30 days? No     Patient currently being followed by outpatient case management? No     Do you currently have service(s) that help you manage your care at home? No     Do you take prescription medications? Yes     Do you have prescription coverage? Yes     Coverage Medicare     Do you have any problems affording any of your prescribed medications? No     Is the patient taking medications as prescribed? yes     Who is going to help you get home at discharge? facility staff     How do you get to doctors appointments? family or friend will provide     Are you on dialysis? No     Do you take coumadin? No     Discharge Plan A Skilled Nursing Facility     DME Needed Upon Discharge  none     Discharge Plan discussed with: Patient     Discharge Barriers Identified None                    Anticipated DC dispo: return to [facility]   Prior Level of Function: dependent with ADLs   PCP:     Comments:  CM met with patient at bedside to introduce role and discuss discharge planning. Patient lives with significant other. Was discharged to SNF under skilled benefit. Patient is agreeable to return to [facility] at [place]. Facility staff with be help at when discharged and can provide transport at time of discharge. CM discharge needs depends on hospital progress. CM will continue following to assist with other needs.

## 2022-09-14 NOTE — CONSULTS
Pharmacy Consult Sign Off    Therapy with vancomycin is complete and/or consult has been discontinued by the provider.   Pharmacy will sign off. Please re-consult as needed.     Thank you for allowing us to participate in this patient's care.     Dinesh Hunt PharmD 9/14/2022 4:18 PM
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
## 2022-09-14 NOTE — SUBJECTIVE & OBJECTIVE
Past Medical History:   Diagnosis Date    DINORAH (acute kidney injury) 2016    Arthritis     CAD in native artery 2019    CHF (congestive heart failure)     Chronic obstructive pulmonary disease 2016    Coronary artery disease involving native coronary artery of native heart without angina pectoris 2016    CRI (chronic renal insufficiency) 2019     donor kidney transplant for DM 16     Induction with Thymo x3 and IV solumedrol to total 875mg  Kidney Biopsy  2016: 16 glomeruli, ACR type 1 AVR type 2, significant microcirculatory changes, c4d negative, No DSA, 5 to10% fibrosis. Treated with thymo x8 2016- no rejection      Diastolic heart failure     Encounter for blood transfusion     ESRD on RRT since 10/2013 10/29/2013    Biopsy proven diabetic nephropathy and lymphoplasmacytic interstitial infiltrate not c/w with AIN (ddx sjogrens or assoc with tamm-horsefall protein extravasation)     GERD (gastroesophageal reflux disease)     History of hepatitis C, s/p successful Rx w/ SVR12 - 2017    Completed 12 weeks harvoni w/ SVR    Hyperlipidemia     Hypertension     PAD (peripheral artery disease) 2019    PIC line (peripherally inserted central catheter) flush     Prophylactic immunotherapy     Proteinuria     PVD (peripheral vascular disease) 2017    RLE BKA CT 16 Extensive atherosclerotic disease of the aorta and mesenteric arteries.     Renal hypertension     Type 2 diabetes mellitus with diabetic neuropathy, with long-term current use of insulin 2016    Vitamin B12 deficiency        Past Surgical History:   Procedure Laterality Date    AORTOGRAPHY WITH SERIALOGRAPHY N/A 2018    Procedure: LEFT LEG ANGIOGRAM;  Surgeon: Donal Mcdonald MD;  Location: [facility] CATH LAB;  Service: Vascular;  Laterality: N/A;    APPLICATION OF LARGE EXTERNAL FIXATION DEVICE TO TIBIA Left 2022    Procedure: APPLICATION, EXTERNAL FIXATION DEVICE, LARGE, TIBIA;   Surgeon: Good Villa MD;  Location: [facility] OR;  Service: Orthopedics;  Laterality: Left;    av bovine graft      Left UE    AV FISTULA PLACEMENT      left UE    CARDIAC CATHETERIZATION  02/2015    CLOSED REDUCTION OF INJURY OF TIBIA Left 8/4/2022    Procedure: CLOSED REDUCTION, TIBIA;  Surgeon: Good Villa MD;  Location: [facility] OR;  Service: Orthopedics;  Laterality: Left;  Closed reduction left tibial and fibular shaft fractures    CLOSURE OF WOUND Left 9/24/2018    Procedure: CLOSURE, WOUND;  Surgeon: Karla Wheeler DPM;  Location: [facility] OR;  Service: Podiatry;  Laterality: Left;  Secondary Wound closure, extensive    CLOSURE OF WOUND Left 11/5/2018    Procedure: CLOSURE, WOUND;  Surgeon: Karla Wheeler DPM;  Location: [facility] OR;  Service: Podiatry;  Laterality: Left;    DEBRIDEMENT OF MULTIPLE METATARSAL BONES Left 11/5/2018    Procedure: DEBRIDEMENT, METATARSAL BONE, 2 OR MORE BONES;  Surgeon: Karla Wheeler DPM;  Location: [facility] OR;  Service: Podiatry;  Laterality: Left;    EXCISION OF SKIN Left 9/27/2019    Procedure: EXCISION, SKIN;  Surgeon: Lenard Alarcon MD;  Location: [address];  Service: Plastics;  Laterality: Left;  Plastics set, NIMS monitor, [employer]Cell    FOOT AMPUTATION THROUGH METATARSAL Left 9/21/2018    Procedure: AMPUTATION, FOOT, TRANSMETATARSAL;  Surgeon: Karla Wheeler DPM;  Location: [facility] OR;  Service: Podiatry;  Laterality: Left;    FOOT AMPUTATION THROUGH METATARSAL Left 10/31/2018    Procedure: AMPUTATION, FOOT, TRANSMETATARSAL;  Surgeon: Karla Wheeler DPM;  Location: [facility] OR;  Service: Podiatry;  Laterality: Left;    FOOT AMPUTATION THROUGH METATARSAL Left 11/5/2018    Procedure: AMPUTATION, FOOT, TRANSMETATARSAL;  Surgeon: Karla Wheeler DPM;  Location: [facility] OR;  Service: Podiatry;  Laterality: Left;  revisional transmetatarsal amputation, Left foot    IRRIGATION AND DEBRIDEMENT OF LOWER EXTREMITY Left 10/31/2018    Procedure: IRRIGATION AND  DEBRIDEMENT, LOWER EXTREMITY;  Surgeon: Karla Wheeler DPM;  Location: [facility] OR;  Service: Podiatry;  Laterality: Left;    KIDNEY TRANSPLANT  05/21/2016    LEFT HEART CATHETERIZATION Left 7/21/2019    Procedure: CATHETERIZATION, HEART, LEFT;  Surgeon: Andrew Valdez MD;  Location: [facility] CATH LAB;  Service: Cardiology;  Laterality: Left;    LEG AMPUTATION THROUGH KNEE  2011    right LE, started as nail puncture leading to diabetic ulcer    SKIN FULL THICKNESS GRAFT Left 10/7/2019    Procedure: APPLICATION, GRAFT, SKIN, FULL-THICKNESS;  Surgeon: Lenard Alarcon MD;  [facility];  Service: Plastics;  Laterality: Left;    SURGICAL REMOVAL OF LESION OF FACE Right 10/7/2019    Procedure: EXCISION, LESION, FACE;  Surgeon: Lenard Alarcon MD;  [facility];  Service: Plastics;  Laterality: Right;       Review of patient's allergies indicates:  No Known Allergies  Current Facility-Administered Medications   Medication Frequency    acetaminophen tablet 650 mg Q4H PRN    albuterol-ipratropium 2.5 mg-0.5 mg/3 mL nebulizer solution 3 mL Q6H PRN    aluminum-magnesium hydroxide-simethicone 200-200-20 mg/5 mL suspension 30 mL QID PRN    atorvastatin tablet 80 mg Daily    carvediloL tablet 12.5 mg BID    cefepime in dextrose 5 % IVPB 2 g Q12H    dextrose 10% bolus 125 mL PRN    dextrose 10% bolus 250 mL PRN    furosemide injection 40 mg Q12H    gabapentin capsule 100 mg TID    glucagon (human recombinant) injection 1 mg PRN    glucose chewable tablet 16 g PRN    glucose chewable tablet 24 g PRN    HYDROcodone-acetaminophen  mg per tablet 1 tablet Q6H PRN    insulin aspart U-100 pen 0-5 Units QID (AC + HS) PRN    insulin detemir U-100 pen 20 Units Daily    linaCLOtide capsule 145 mcg Before breakfast    linezolid 600 mg/300 mL IVPB 600 mg Q12H    magnesium oxide tablet 800 mg PRN    magnesium oxide tablet 800 mg PRN    melatonin tablet 6 mg Nightly PRN    morphine injection 2 mg Q4H PRN     mycophenolate capsule 250 mg BID    naloxone 0.4 mg/mL injection 0.02 mg PRN    ondansetron injection 4 mg Q8H PRN    prochlorperazine injection Soln 5 mg Q6H PRN    sertraline tablet 25 mg Daily    simethicone chewable tablet 80 mg QID PRN    sodium chloride 0.9% flush 10 mL Q8H PRN    tacrolimus capsule 1 mg BID     Family History       Problem Relation (Age of Onset)    Cancer Father    Diabetes Father    Heart failure Father, Mother    Stroke Father          Tobacco Use    Smoking status: Former     Packs/day: 1.00     Years: 40.00     Pack years: 40.00     Types: Cigarettes     Quit date: 2013     Years since quittin.6    Smokeless tobacco: Never   Substance and Sexual Activity    Alcohol use: Yes     Alcohol/week: 6.0 standard drinks     Types: 6 Cans of beer per week     Comment: seldom    Drug use: No    Sexual activity: Never     Review of Systems   Constitutional:  Positive for fatigue.   Respiratory: Negative.     Cardiovascular: Negative.    Gastrointestinal: Negative.    Genitourinary: Negative.    Neurological: Negative.    Objective:     Vital Signs (Most Recent):  Temp: 98.5 °F (36.9 °C) (22 1539)  Pulse: 79 (22 1539)  Resp: 16 (22 1747)  BP: 128/71 (22 1539)  SpO2: (!) 93 % (22 153)  O2 Device (Oxygen Therapy): nasal cannula (22 1103)   Vital Signs (24h Range):  Temp:  [97.4 °F (36.3 °C)-100.3 °F (37.9 °C)] 98.5 °F (36.9 °C)  Pulse:  [66-91] 79  Resp:  [16-20] 16  SpO2:  [81 %-97 %] 93 %  BP: ()/(49-71) 128/71     Weight: 102 kg (224 lb 13.9 oz) (22 0506)  Body mass index is 31.36 kg/m².  Body surface area is 2.26 meters squared.    No intake/output data recorded.    Physical Exam  Vitals and nursing note reviewed.   Constitutional:       General: He is not in acute distress.     Appearance: Normal appearance. He is ill-appearing.   Cardiovascular:      Rate and Rhythm: Normal rate and regular rhythm.      Pulses: Normal pulses.      Heart  sounds: Normal heart sounds.   Pulmonary:      Effort: Pulmonary effort is normal.      Breath sounds: Normal breath sounds. No rales.   Abdominal:      Palpations: Abdomen is soft.      Tenderness: There is no abdominal tenderness.   Musculoskeletal:      Right lower leg: No edema.      Left lower leg: No edema.   Neurological:      Mental Status: He is alert and oriented to person, place, and time.   Psychiatric:         Behavior: Behavior normal.       Significant Labs: reviewed  [facility]  Lab Results   Component Value Date     (L) 09/14/2022    K 4.3 09/14/2022    CL 95 09/14/2022    CO2 21 (L) 09/14/2022    BUN 25 (H) 09/14/2022    CREATININE 1.6 (H) 09/14/2022    CALCIUM 7.8 (L) 09/14/2022    ANIONGAP 10 09/14/2022    ESTGFRAFRICA 47 (A) 08/15/2021    EGFRNONAA 41 (A) 08/15/2021     Lab Results   Component Value Date    WBC 8.08 09/14/2022    HGB 9.7 (L) 09/14/2022    HCT 31.5 (L) 09/14/2022    MCV 91 09/14/2022     09/14/2022       Prograf level 10.8    Significant Imaging: reviewed

## 2022-09-14 NOTE — PROGRESS NOTES
Pharmacokinetic Initial Assessment: IV Vancomycin    Assessment/Plan:    Initiate intravenous vancomycin with loading dose of 2000 mg once with subsequent doses when random concentrations are less than 20 mcg/mL  Desired empiric serum trough concentration is 15 to 20 mcg/mL  Draw vancomycin random level on 9/14 at 12:00.  Pharmacy will continue to follow and monitor vancomycin.      Please contact pharmacy at extension [phone] with any questions regarding this assessment.     Thank you for the consult,   Nicolas Fuchs       Patient brief summary:  Lavelle Ladd is a 67 y.o. male initiated on antimicrobial therapy with IV Vancomycin for treatment of suspected bone/joint infection    Drug Allergies:   Review of patient's allergies indicates:  No Known Allergies    Actual Body Weight:   102kg    Renal Function:   Estimated Creatinine Clearance: 47.1 mL/min (A) (based on SCr of 1.8 mg/dL (H)).,     Dialysis Method (if applicable):  N/A    CBC (last 72 hours):  Recent Labs   Lab Result Units 09/13/22  1407   WBC K/uL 9.14   Hemoglobin g/dL 9.5*   Hematocrit % 30.1*   Platelets K/uL 228   Gran % % 75.2*   Lymph % % 11.5*   Mono % % 12.8   Eosinophil % % 0.1   Basophil % % 0.1   Differential Method  Automated       Metabolic Panel (last 72 hours):  Recent Labs   Lab Result Units 09/13/22  1627   Sodium mmol/L 130*   Potassium mmol/L 4.2   Chloride mmol/L 97   CO2 mmol/L 19*   Glucose mg/dL 209*   BUN mg/dL 28*   Creatinine mg/dL 1.8*   Albumin g/dL 1.9*   Total Bilirubin mg/dL 0.8   Alkaline Phosphatase U/L 280*   AST U/L 66*   ALT U/L 48*       Drug levels (last 3 results):  No results for input(s): VANCOMYCINRA, VANCORANDOM, VANCOMYCINPE, VANCOPEAK, VANCOMYCINTR, VANCOTROUGH in the last 72 hours.    Microbiologic Results:  Microbiology Results (last 7 days)       Procedure Component Value Units Date/Time    Blood Culture #2 **CANNOT BE ORDERED STAT** [[id number]] Collected: 09/13/22 5693    Order Status: Sent Specimen:  Blood from Wrist, Right Updated: 09/13/22 2100    Blood Culture #1 **CANNOT BE ORDERED STAT** [[id number]] Collected: 09/13/22 1434    Order Status: Sent Specimen: Blood from Peripheral, Forearm, Right Updated: 09/13/22 2059
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
## 2022-09-15 PROBLEM — T81.42XA INFECTION OF DEEP INCISIONAL SURGICAL SITE AFTER PROCEDURE: Status: ACTIVE | Noted: 2022-01-01
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
## 2022-09-15 NOTE — ASSESSMENT & PLAN NOTE
Patient's FSGs are uncontrolled due to hyperglycemia on current medication regimen.  Last A1c reviewed-   Lab Results   Component Value Date    HGBA1C 7.4 (H) 08/04/2022     Most recent fingerstick glucose reviewed-   Recent Labs   Lab 09/15/22  0838 09/15/22  1136   POCTGLUCOSE 258* 325*     Current correctional scale  Low  Maintain anti-hyperglycemic dose as follows-   Antihyperglycemics (From admission, onward)    Start     Stop Route Frequency Ordered    09/14/22 1315  insulin detemir U-100 pen 20 Units         -- SubQ Daily 09/14/22 1302    09/13/22 2120  insulin aspart U-100 pen 0-5 Units         -- SubQ Before meals & nightly PRN 09/13/22 2023        Levemir added- will need to titrate  Hold Oral hypoglycemics while patient is in the hospital.

## 2022-09-15 NOTE — NURSING
"POC reviewed with patient. Pt verbalized understanding  C/o pain.... Moderately controlled by PRN meds and relaxation techniques.   AAO x4. VSS  NR on tele monitor.   PIV saline locked, clean, dry, intact  No other c/o at this time.  Pt remains free of injuries and falls; fall precaution in place.   Bed low, side rails x2, call light in reach, personal belongings at bedside.  Reminded to call for assistance.  Repositioned independently.  Hourly rounding complete. Will continue to monitor.            /71 (Patient Position: Lying)   Pulse 79   Temp 98.5 °F (36.9 °C) (Oral)   Resp 16   Ht 5' 11" (1.803 m)   Wt 102 kg (224 lb 13.9 oz)   SpO2 (!) 93%   BMI 31.36 kg/m²     "
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
## 2022-09-15 NOTE — PLAN OF CARE
Recommendations  1) Continue Renal diet   2) Add Aguilar BID for wound healing   3) If PO < 50% meals, Add Novasource Renal BID   4) RD to complete NFPE at follow up    Goals: Pt to meet > 50% EEN by RD follow up  Nutrition Goal Status: new  Communication of RD Recs: other (comment) (POC)    Assessment and Plan      Nutrition Problem  Increased nutrient needs    Related to (etiology):   Wound healing    Signs and Symptoms (as evidenced by):   Infection incision    Interventions(treatment strategy):  Collaboration with other providers  Commercial beverage  Mineral modified diet (Renal)    Nutrition Diagnosis Status:   New

## 2022-09-15 NOTE — PROGRESS NOTES
[facility])  Orthopedics  Progress Note    Patient Name: Lavelle Ladd  MRN: [id number]  Admission Date: 9/13/2022  Hospital Length of Stay: 1 days  Attending Provider: Faraz Ng MD  Primary Care Provider: Primary Doctor No  Follow-up For: Procedure(s) (LRB):  INCISION AND DRAINAGE,BONE ABSCESS OR OSTEOMYELITIS,FOOT (Left)  REMOVAL, HARDWARE, LOWER EXTREMITY (Left)    Post-Operative Day: 1 Day Post-Op  Subjective:     Principal Problem:  Left distal tibia/fibula fracture with infected external fixator    Principal Orthopedic Problem: Left distal tibia/fibula fracture with infected external fixator    Interval History: Lavelle Ladd is 67-year-old male with infected hardware to his left lower extremity.  He was scheduled for removal of the external fixator and debridement of osteomyelitis yesterday, but surgery was delayed due to his hyponatremia.  Patient is resting comfortably in bed.  He has no new complaints at this time    Review of patient's allergies indicates:  No Known Allergies    Current Facility-Administered Medications   Medication    acetaminophen tablet 650 mg    albuterol-ipratropium 2.5 mg-0.5 mg/3 mL nebulizer solution 3 mL    aluminum-magnesium hydroxide-simethicone 200-200-20 mg/5 mL suspension 30 mL    atorvastatin tablet 80 mg    carvediloL tablet 12.5 mg    cefepime in dextrose 5 % IVPB 2 g    dextrose 10% bolus 125 mL    dextrose 10% bolus 250 mL    furosemide injection 40 mg    gabapentin capsule 100 mg    glucagon (human recombinant) injection 1 mg    glucose chewable tablet 16 g    glucose chewable tablet 24 g    HYDROcodone-acetaminophen  mg per tablet 1 tablet    insulin aspart U-100 pen 0-5 Units    insulin detemir U-100 pen 20 Units    linaCLOtide capsule 145 mcg    linezolid 600 mg/300 mL IVPB 600 mg    magnesium oxide tablet 400 mg    magnesium oxide tablet 800 mg    magnesium oxide tablet 800 mg    magnesium sulfate 2g in water 50mL IVPB (premix)     "melatonin tablet 6 mg    morphine injection 2 mg    mycophenolate capsule 250 mg    naloxone 0.4 mg/mL injection 0.02 mg    ondansetron injection 4 mg    prochlorperazine injection Soln 5 mg    sertraline tablet 25 mg    simethicone chewable tablet 80 mg    sodium chloride 0.9% flush 10 mL    tacrolimus capsule 1 mg     Objective:     Vital Signs (Most Recent):  Temp: 99.7 °F (37.6 °C) (09/15/22 0423)  Pulse: 96 (09/15/22 0500)  Resp: 18 (09/15/22 0841)  BP: 127/60 (09/15/22 0422)  SpO2: (!) 94 % (09/15/22 0422)   Vital Signs (24h Range):  Temp:  [98.5 °F (36.9 °C)-100.3 °F (37.9 °C)] 99.7 °F (37.6 °C)  Pulse:  [78-96] 96  Resp:  [16-18] 18  SpO2:  [92 %-94 %] 94 %  BP: (117-128)/(57-77) 127/60     Weight: 102 kg (224 lb 13.9 oz)  Height: 5' 11" (180.3 cm)  Body mass index is 31.36 kg/m².      Intake/Output Summary (Last 24 hours) at 9/15/2022 0927  Last data filed at 9/15/2022 0449  Gross per 24 hour   Intake --   Output 400 ml   Net -400 ml       Ortho/SPM Exam  Left lower extremity:  Transmetatarsal amputation noted  External fixator is intact   Open wound at the calcaneal pin sites that goes all the way through to the other side  There is purulence drainage on the pins and the patient's betting   Calcaneus moves freely back and forth along the pin   Proximal pins in the tibia are well aligned with no signs of infection   Calf and compartments are soft and compressible   Motor exam normal   Sensation and pulses decreased, but patient reports there in line with his baseline    GEN: Well developed, well nourished male. AAOX3. No acute distress.   Head: Normocephalic, atraumatic.   Eyes: [place]  Neck: Trachea is midline, no adenopathy  Resp: Breathing unlabored.  Neuro: Motor function normal, Cranial nerves intact  Psych: Mood and affect appropriate.      Significant Labs: CBC:   Recent Labs   Lab 09/13/22  1407 09/14/22  0439 09/15/22  0452   WBC 9.14 8.08 7.84   HGB 9.5* 9.7* 9.3*   HCT 30.1* 31.5* 28.8*    " 247 223     CMP:   Recent Labs   Lab 09/13/22  1627 09/14/22  0439 09/15/22  0452   * 126* 128*   K 4.2 4.3 4.0   CL 97 95 96   CO2 19* 21* 20*   * 255* 266*   BUN 28* 25* 21   CREATININE 1.8* 1.6* 1.6*   CALCIUM 8.3* 7.8* 7.9*   PROT 6.3 5.2* 5.2*   ALBUMIN 1.9* 1.8* 1.8*   BILITOT 0.8 0.9 1.3*   ALKPHOS 280* 275* 402*   AST 66* 55* 230*   ALT 48* 44 94*   ANIONGAP 14 10 12     All pertinent labs within the past 24 hours have been reviewed.    Significant Imaging: None    Assessment/Plan:     Active Diagnoses:    Diagnosis Date Noted POA    Acute osteomyelitis of left calcaneus [M86.172] 09/13/2022 Yes    Hyponatremia [E87.1] 08/30/2022 Yes    Stage 3 chronic kidney disease [N18.30]  Yes    Long term current use of immunosuppressive drug [Z79.899] 07/06/2018 Not Applicable    Kidney transplant recipient [Z94.0] 04/07/2017 Not Applicable    H/O amputation [Z89.9] 12/01/2016 Yes    Chronic obstructive pulmonary disease [J44.9] 09/12/2016 Yes    Coronary artery disease of native artery of native heart with stable angina pectoris [I25.118] 07/01/2016 Yes    Type 2 diabetes mellitus with hyperglycemia, with long-term current use of insulin [E11.65, Z79.4]  Not Applicable    Essential hypertension [I10] 02/20/2015 Yes      Problems Resolved During this Admission:     Assessment:   67-year-old male with left calcaneal osteomyelitis in the setting of an external fixator for distal tibia/fibula fractures    Plan:   Patient should continue to receive IV antibiotics  NWB LLE  Plan to take the patient to the operating room this afternoon, but his sodium needs to be 130 or higher per Anesthesia, we will reach out to them today to discuss this further     Tam Mcdermott PA-C  Orthopedics  [facility] - Telemetry ([facility])

## 2022-09-15 NOTE — PLAN OF CARE
Problem: Adult Inpatient Plan of Care  Goal: Plan of Care Review  Outcome: Ongoing, Progressing  Goal: Patient-Specific Goal (Individualized)  Outcome: Ongoing, Progressing  Goal: Absence of Hospital-Acquired Illness or Injury  Outcome: Ongoing, Progressing  Goal: Optimal Comfort and Wellbeing  Outcome: Ongoing, Progressing  Goal: Readiness for Transition of Care  Outcome: Ongoing, Progressing     Problem: Infection  Goal: Absence of Infection Signs and Symptoms  Outcome: Ongoing, Progressing     Problem: Diabetes Comorbidity  Goal: Blood Glucose Level Within Targeted Range  Outcome: Ongoing, Progressing     Problem: Fluid Imbalance (Pneumonia)  Goal: Fluid Balance  Outcome: Ongoing, Progressing     Problem: Infection (Pneumonia)  Goal: Resolution of Infection Signs and Symptoms  Outcome: Ongoing, Progressing     Problem: Respiratory Compromise (Pneumonia)  Goal: Effective Oxygenation and Ventilation  Outcome: Ongoing, Progressing     Problem: Impaired Wound Healing  Goal: Optimal Wound Healing  Outcome: Ongoing, Progressing     Problem: Fall Injury Risk  Goal: Absence of Fall and Fall-Related Injury  Outcome: Ongoing, Progressing     Problem: Skin Injury Risk Increased  Goal: Skin Health and Integrity  Outcome: Ongoing, Progressing

## 2022-09-15 NOTE — SUBJECTIVE & OBJECTIVE
Interval History: Pt was seen and examined. Labs and meds reviewed. Discussed with other providers. No new c/o's, no overall change. Pt feels poorly. Drinking less water since yesterday.    Review of patient's allergies indicates:  No Known Allergies  Current Facility-Administered Medications   Medication Frequency    acetaminophen tablet 650 mg Q4H PRN    albuterol-ipratropium 2.5 mg-0.5 mg/3 mL nebulizer solution 3 mL Q6H PRN    aluminum-magnesium hydroxide-simethicone 200-200-20 mg/5 mL suspension 30 mL QID PRN    atorvastatin tablet 80 mg Daily    carvediloL tablet 12.5 mg BID    cefepime in dextrose 5 % IVPB 2 g Q12H    dextrose 10% bolus 125 mL PRN    dextrose 10% bolus 250 mL PRN    furosemide injection 40 mg Q12H    gabapentin capsule 100 mg TID    glucagon (human recombinant) injection 1 mg PRN    glucose chewable tablet 16 g PRN    glucose chewable tablet 24 g PRN    HYDROcodone-acetaminophen  mg per tablet 1 tablet Q6H PRN    insulin aspart U-100 pen 0-5 Units QID (AC + HS) PRN    insulin detemir U-100 pen 20 Units Daily    linaCLOtide capsule 145 mcg Before breakfast    linezolid 600 mg/300 mL IVPB 600 mg Q12H    magnesium oxide tablet 400 mg BID    magnesium oxide tablet 800 mg PRN    magnesium oxide tablet 800 mg PRN    magnesium sulfate 2g in water 50mL IVPB (premix) Once    melatonin tablet 6 mg Nightly PRN    morphine injection 2 mg Q4H PRN    mycophenolate capsule 250 mg BID    naloxone 0.4 mg/mL injection 0.02 mg PRN    ondansetron injection 4 mg Q8H PRN    prochlorperazine injection Soln 5 mg Q6H PRN    sertraline tablet 25 mg Daily    simethicone chewable tablet 80 mg QID PRN    sodium chloride 0.9% flush 10 mL Q8H PRN    tacrolimus capsule 1 mg BID       Objective:     Vital Signs (Most Recent):  Temp: 99.7 °F (37.6 °C) (09/15/22 0423)  Pulse: 96 (09/15/22 0500)  Resp: 18 (09/15/22 0841)  BP: 127/60 (09/15/22 0422)  SpO2: (!) 94 % (09/15/22 0422)  O2 Device (Oxygen Therapy): nasal cannula  (09/14/22 1103)   Vital Signs (24h Range):  Temp:  [98.5 °F (36.9 °C)-100.3 °F (37.9 °C)] 99.7 °F (37.6 °C)  Pulse:  [78-96] 96  Resp:  [16-18] 18  SpO2:  [92 %-94 %] 94 %  BP: (117-128)/(57-77) 127/60     Weight: 102 kg (224 lb 13.9 oz) (09/14/22 0506)  Body mass index is 31.36 kg/m².  Body surface area is 2.26 meters squared.    I/O last 3 completed shifts:  In: 546.3 [IV Piggyback:546.3]  Out: 400 [Urine:400]    Physical Exam  Vitals and nursing note reviewed.   Constitutional:       Appearance: Normal appearance.   Cardiovascular:      Rate and Rhythm: Normal rate and regular rhythm.      Pulses: Normal pulses.      Heart sounds: Normal heart sounds.   Pulmonary:      Effort: Pulmonary effort is normal.      Breath sounds: Normal breath sounds.   Abdominal:      Palpations: Abdomen is soft.      Tenderness: There is no abdominal tenderness.   Musculoskeletal:      Right lower leg: No edema.      Left lower leg: No edema.   Neurological:      Mental Status: He is alert and oriented to person, place, and time.   Psychiatric:         Behavior: Behavior normal.       Significant Labs: reviewed  BMP, s na yesterday was 125  Lab Results   Component Value Date     (L) 09/15/2022    K 4.0 09/15/2022    CL 96 09/15/2022    CO2 20 (L) 09/15/2022    BUN 21 09/15/2022    CREATININE 1.6 (H) 09/15/2022    CALCIUM 7.9 (L) 09/15/2022    ANIONGAP 12 09/15/2022    ESTGFRAFRICA 47 (A) 08/15/2021    EGFRNONAA 41 (A) 08/15/2021     Lab Results   Component Value Date    WBC 7.84 09/15/2022    HGB 9.3 (L) 09/15/2022    HCT 28.8 (L) 09/15/2022    MCV 89 09/15/2022     09/15/2022       CT left ankle reviewed:

## 2022-09-15 NOTE — SUBJECTIVE & OBJECTIVE
Interval History: pt in bed upon exam and reports pain to affected area.  Surgery postponed due to hyponatremia (Na 132 corrected). Lasix given. Magnesium replaced. Ex fix in place with foul drainage noted. Nephrology and Orthopedic Surgery following.     Review of Systems   Constitutional:  Positive for activity change and fatigue. Negative for appetite change, chills, diaphoresis and fever.   HENT:  Negative for congestion, nosebleeds, sore throat and trouble swallowing.    Eyes:  Negative for pain, discharge and visual disturbance.   Respiratory:  Negative for apnea, cough, chest tightness, shortness of breath, wheezing and stridor.    Cardiovascular:  Negative for chest pain, palpitations and leg swelling.   Gastrointestinal:  Negative for abdominal distention, abdominal pain, blood in stool, constipation, diarrhea, nausea and vomiting.   Endocrine: Negative for cold intolerance and heat intolerance.   Genitourinary:  Negative for difficulty urinating, dysuria, flank pain, frequency and urgency.   Musculoskeletal:  Positive for arthralgias (left ankle pain), gait problem and joint swelling. Negative for back pain, myalgias, neck pain and neck stiffness.   Skin:  Negative for rash and wound.   Allergic/Immunologic: Negative for food allergies and immunocompromised state.   Neurological:  Negative for dizziness, seizures, syncope, facial asymmetry, weakness, light-headedness and headaches.   Hematological:  Negative for adenopathy.   Psychiatric/Behavioral:  Negative for agitation, behavioral problems and confusion. The patient is not nervous/anxious.    Objective:     Vital Signs (Most Recent):  Temp: 99.7 °F (37.6 °C) (09/15/22 0423)  Pulse: 96 (09/15/22 0500)  Resp: 18 (09/15/22 0841)  BP: 127/60 (09/15/22 0422)  SpO2: (!) 94 % (09/15/22 0422)   Vital Signs (24h Range):  Temp:  [98.5 °F (36.9 °C)-99.7 °F (37.6 °C)] 99.7 °F (37.6 °C)  Pulse:  [78-96] 96  Resp:  [16-18] 18  SpO2:  [93 %-94 %] 94 %  BP:  (127-128)/(60-77) 127/60     Weight: 102 kg (224 lb 13.9 oz)  Body mass index is 31.36 kg/m².    Intake/Output Summary (Last 24 hours) at 9/15/2022 1528  Last data filed at 9/15/2022 0449  Gross per 24 hour   Intake --   Output 400 ml   Net -400 ml      Physical Exam  Vitals and nursing note reviewed.   Constitutional:       General: He is not in acute distress.     Appearance: He is well-developed. He is not diaphoretic.   HENT:      Head: Normocephalic and atraumatic.      Nose: Nose normal.   Eyes:      General: No scleral icterus.     Conjunctiva/sclera: Conjunctivae normal.   Cardiovascular:      Rate and Rhythm: Normal rate and regular rhythm.      Heart sounds: Normal heart sounds. No murmur heard.    No friction rub. No gallop.   Pulmonary:      Effort: Pulmonary effort is normal. No respiratory distress.      Breath sounds: Normal breath sounds. No stridor. No wheezing or rales.   Chest:      Chest wall: No tenderness.   Abdominal:      General: Bowel sounds are normal. There is no distension.      Palpations: Abdomen is soft.      Tenderness: There is no abdominal tenderness. There is no guarding or rebound.   Genitourinary:     Comments: Deferred   Musculoskeletal:         General: No tenderness or deformity. Normal range of motion.      Cervical back: Normal range of motion and neck supple.      Comments: Left TMA with external fixator in place to distal Tib/fib.    Pin site with open wound and foul drainage   Right BKA   Skin:     General: Skin is warm and dry.      Coloration: Skin is not pale.      Findings: No erythema or rash.   Neurological:      Mental Status: He is alert and oriented to person, place, and time.      Cranial Nerves: No cranial nerve deficit.      Motor: No abnormal muscle tone.      Coordination: Coordination normal.   Psychiatric:         Behavior: Behavior normal.         Thought Content: Thought content normal.       Significant Labs: All pertinent labs within the past 24 hours  have been reviewed.  CBC:   Recent Labs   Lab 09/14/22  0439 09/15/22  0452   WBC 8.08 7.84   HGB 9.7* 9.3*   HCT 31.5* 28.8*    223     CMP:   Recent Labs   Lab 09/13/22  1627 09/14/22  0439 09/15/22  0452   * 126* 128*   K 4.2 4.3 4.0   CL 97 95 96   CO2 19* 21* 20*   * 255* 266*   BUN 28* 25* 21   CREATININE 1.8* 1.6* 1.6*   CALCIUM 8.3* 7.8* 7.9*   PROT 6.3 5.2* 5.2*   ALBUMIN 1.9* 1.8* 1.8*   BILITOT 0.8 0.9 1.3*   ALKPHOS 280* 275* 402*   AST 66* 55* 230*   ALT 48* 44 94*   ANIONGAP 14 10 12     Magnesium:   Recent Labs   Lab 09/15/22  0452 09/15/22  1159   MG 1.3* 1.4*       Significant Imaging: I have reviewed all pertinent imaging results/findings within the past 24 hours.
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
## 2022-09-15 NOTE — PLAN OF CARE
Updated progress notes sent to Summer FARFAN per her request.       09/15/22 0927   Post-Acute Status   Post-Acute Authorization Placement   Post-Acute Placement Status Pending medical clearance/testing   Discharge Plan   Discharge Plan A Skilled Nursing Facility
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
## 2022-09-15 NOTE — ASSESSMENT & PLAN NOTE
9/14/22:Surgery was post-poned 2/2 hyponatremia -Na 126. Corrected Na to glucose is 129. . CXR- some cephalization. Abd u/s showed- cirrhosis changes to liver. Hyponatremia likely 2/2 hypervolemia and Cirrhosis. Will add IV lasix. Add Levemir for hyperglycemia. Tacrolimus level 10- will consult nephrology for renal transplant and hyponatremia   -9/15/22- Na corrected to 132- BNP elevated - Lasix given

## 2022-09-15 NOTE — ASSESSMENT & PLAN NOTE
Ortho consulted  Plans for surgical intervention   NPO after MN  Empiric vanc and cefepime  Hardware in place  Blood cultures pending      9/14/22: c/o of left ankle pain-controlled. Pt was scheduled for surgery, however, surgery was post-poned 2/2 hyponatremia -Na 126. WBC normal, afebrile. Blood cultures show NGTD. .3. cont IV Abx  09/15/22- removal of the external fixator and debridement of osteomyelitis planned for today- procedure postponed due to hyponatremia- Lasix given - Nephrology following

## 2022-09-15 NOTE — ANESTHESIA PREPROCEDURE EVALUATION
09/15/2022  Lavelle Ladd is a 67 y.o., male.    Patient Active Problem List   Diagnosis    Renal hypertension    GERD (gastroesophageal reflux disease)    Peptic ulcer disease    Malignant HTN with heart disease, w/o CHF, with chronic kidney disease    Essential hypertension    Gastroparesis     donor kidney transplant for DM 16    Prophylactic immunotherapy    Adverse effect of glucocorticoids and synthetic analogues, sequela    Osteoarthritis of lumbar spine    Osteoarthritis of thoracic spine with myelopathy    Type 2 diabetes mellitus with hyperglycemia, with long-term current use of insulin    Secondary hyperparathyroidism of renal origin    Coronary artery disease of native artery of native heart with stable angina pectoris    Hyperlipidemia    Chronic obstructive pulmonary disease    Ulnar neuropathy    Reactive airway disease    Renal transplant, status post    Type 2 diabetes mellitus with retinopathy, with long-term current use of insulin    Type 2 diabetes mellitus with stable proliferative retinopathy of both eyes, with long-term current use of insulin    H/O amputation    Cavitary lesion of lung    Opacity of lung on imaging study    Bacteremia due to Gram-negative bacteria    ESBL (extended spectrum beta-lactamase) producing bacteria infection    History of hepatitis C, s/p successful Rx w/ SVR12 - 2017    Kidney transplant recipient    Intractable vomiting with nausea    Chronic bilateral low back pain with left-sided sciatica    Diabetic polyneuropathy    Other chest pain    Disc disease, degenerative, lumbar or lumbosacral    Numbness and tingling    Adjustment insomnia    Lumbar stenosis with neurogenic claudication    Acute lumbar radiculopathy    Chronic lumbar radiculopathy    Long term current use of immunosuppressive drug     Peripheral vascular disease    Diabetic foot infection    Failure to thrive in adult    Fracture of one rib of right side    MSSA bacteremia    Abscess of left foot    S/P transmetatarsal amputation of foot, left    Non compliance with medical treatment    Hx of right BKA    Immunosuppression    Encounter for medication review and counseling    Stage 3 chronic kidney disease    Osteomyelitis    Chest pain    CAD in native artery    CRI (chronic renal insufficiency)    Squamous cell carcinoma of skin of face    Hypotension due to hypovolemia    Squamous cell carcinoma, scalp/neck    Squamous cell carcinoma of skin of scalp and neck    CAMARA (dyspnea on exertion)    Atypical chest pain    Old MI (myocardial infarction)    Mobitz type 1 second degree atrioventricular block    Pneumonia of left lower lobe due to infectious organism    MARIA INES (obstructive sleep apnea)    Central sleep apnea comorbid with prescribed opioid use    Central sleep apnea secondary to congestive heart failure (CHF)    Behaviorally induced insufficient sleep syndrome    Inadequate sleep hygiene    Severe central sleep apnea comorbid with prescribed opioid use    Periodic limb movement disorder (PLMD)    Opioid withdrawal    Diabetic ulcer of left midfoot associated with type 2 diabetes mellitus, with fat layer exposed    Near syncope    Closed fracture of left tibia and fibula    Pressure injury of left hip, stage 2    Acute hypoxemic respiratory failure and bilateral pneumonia due to COVID-19    Hyponatremia    Acute osteomyelitis of left calcaneus    Infection of deep incisional surgical site after procedure       Anesthesia Evaluation    I have reviewed the Patient Summary Reports.    I have reviewed the Nursing Notes. I have reviewed the NPO Status.   I have reviewed the Medications.     Review of Systems  Anesthesia Hx:  No problems with previous Anesthesia Denies Hx of Anesthetic complications  Denies  Family Hx of Anesthesia complications.   Denies Personal Hx of Anesthesia complications.   Social:  Non-Smoker    Hematology/Oncology:         -- Anemia: Hematology Comments: Hyponatremia   Cardiovascular:   Hypertension CAD  Dysrhythmias  CHF PVD hyperlipidemia ECG has been reviewed. EF normal  Diastolic dysfunction   Pulmonary:   COPD Shortness of breath Sleep Apnea    Renal/:   Chronic Renal Disease, ESRD, CRI S/p renal transplant 2 years ago   Hepatic/GI:   PUD, GERD Liver Disease,    Musculoskeletal:   Arthritis  Osteoarthritis of lumbar spine  Osteoarthritis of thoracic spine with myelopathy     Neurological:  Neurology Normal    Endocrine:   Diabetes  Obesity / BMI > 30      Physical Exam  General:  Well nourished      Airway/Jaw/Neck:  Airway Findings: Mouth Opening: Normal   Tongue: Normal   General Airway Assessment: Adult Mallampati: II  TM Distance: 4 - 6 cm   Jaw/Neck Findings:  Neck ROM: Normal ROM       Dental:  Dental Findings: In tact     Chest/Lungs:  Chest/Lungs Findings: Clear to auscultation, Normal Respiratory Rate      Heart/Vascular:  Heart Findings: Rate: Normal  Rhythm: Regular Rhythm  Sounds: Normal      Musculoskeletal:  Musculoskeletal Findings:     Mental Status:  Mental Status Findings:  Cooperative, Alert and Oriented         Anesthesia Plan  Type of Anesthesia, risks & benefits discussed:  Anesthesia Type:  general    Patient's Preference:   Plan Factors:          Intra-op Monitoring Plan: standard ASA monitors  Intra-op Monitoring Plan Comments:   Post Op Pain Control Plan: per primary service following discharge from PACU  Post Op Pain Control Plan Comments:     Induction:   IV  Beta Blocker:  Patient is on a Beta-Blocker and has received one dose within the past 24 hours (No further documentation required).       Informed Consent: Informed consent signed with the Patient and all parties understand the risks and agree with anesthesia plan.  All questions answered.  Anesthesia  consent signed with patient.  ASA Score: 3   Emergent   Day of Surgery Review of History & Physical: I have interviewed and examined the patient. I have reviewed the patient's H&P dated:  There are no significant changes.            Ready For Surgery From Anesthesia Perspective.       Past Medical History:   Diagnosis Date    DINORAH (acute kidney injury) 2016    Arthritis     CAD in native artery 2019    CHF (congestive heart failure)     Chronic obstructive pulmonary disease 2016    Coronary artery disease involving native coronary artery of native heart without angina pectoris 2016    CRI (chronic renal insufficiency) 2019     donor kidney transplant for DM 16     Induction with Thymo x3 and IV solumedrol to total 875mg  Kidney Biopsy  2016: 16 glomeruli, ACR type 1 AVR type 2, significant microcirculatory changes, c4d negative, No DSA, 5 to10% fibrosis. Treated with thymo x8 2016- no rejection      Diastolic heart failure     Encounter for blood transfusion     ESRD on RRT since 10/2013 10/29/2013    Biopsy proven diabetic nephropathy and lymphoplasmacytic interstitial infiltrate not c/w with AIN (ddx sjogrens or assoc with tamm-horsefall protein extravasation)     GERD (gastroesophageal reflux disease)     History of hepatitis C, s/p successful Rx w/ SVR12 - 2017    Completed 12 weeks harvoni w/ SVR    Hyperlipidemia     Hypertension     PAD (peripheral artery disease) 2019    PIC line (peripherally inserted central catheter) flush     Prophylactic immunotherapy     Proteinuria     PVD (peripheral vascular disease) 2017    RLE BKA CT 16 Extensive atherosclerotic disease of the aorta and mesenteric arteries.     Renal hypertension     Type 2 diabetes mellitus with diabetic neuropathy, with long-term current use of insulin 2016    Vitamin B12 deficiency        Past Surgical History:   Procedure Laterality Date     AORTOGRAPHY WITH SERIALOGRAPHY N/A 6/14/2018    Procedure: LEFT LEG ANGIOGRAM;  Surgeon: Donal Mcdonald MD;  Location: [facility] CATH LAB;  Service: Vascular;  Laterality: N/A;    APPLICATION OF LARGE EXTERNAL FIXATION DEVICE TO TIBIA Left 8/4/2022    Procedure: APPLICATION, EXTERNAL FIXATION DEVICE, LARGE, TIBIA;  Surgeon: Good Villa MD;  Location: [facility] OR;  Service: Orthopedics;  Laterality: Left;    av bovine graft      Left UE    AV FISTULA PLACEMENT      left UE    CARDIAC CATHETERIZATION  02/2015    CLOSED REDUCTION OF INJURY OF TIBIA Left 8/4/2022    Procedure: CLOSED REDUCTION, TIBIA;  Surgeon: Good Villa MD;  Location: [facility] OR;  Service: Orthopedics;  Laterality: Left;  Closed reduction left tibial and fibular shaft fractures    CLOSURE OF WOUND Left 9/24/2018    Procedure: CLOSURE, WOUND;  Surgeon: Karla Wheeler DPM;  Location: [facility];  Service: Podiatry;  Laterality: Left;  Secondary Wound closure, extensive    CLOSURE OF WOUND Left 11/5/2018    Procedure: CLOSURE, WOUND;  Surgeon: Karla Wheeler DPM;  Location: [facility] OR;  Service: Podiatry;  Laterality: Left;    DEBRIDEMENT OF MULTIPLE METATARSAL BONES Left 11/5/2018    Procedure: DEBRIDEMENT, METATARSAL BONE, 2 OR MORE BONES;  Surgeon: Karla Wheeler DPM;  Location: [facility] OR;  Service: Podiatry;  Laterality: Left;    EXCISION OF SKIN Left 9/27/2019    Procedure: EXCISION, SKIN;  Surgeon: Lenard Alarcon MD;  Location: [address];  Service: Plastics;  Laterality: Left;  Plastics set, NIMS monitor, [employer]    FOOT AMPUTATION THROUGH METATARSAL Left 9/21/2018    Procedure: AMPUTATION, FOOT, TRANSMETATARSAL;  Surgeon: Karla Wheeler DPM;  Location: [facility] OR;  Service: Podiatry;  Laterality: Left;    FOOT AMPUTATION THROUGH METATARSAL Left 10/31/2018    Procedure: AMPUTATION, FOOT, TRANSMETATARSAL;  Surgeon: Karla Wheeler DPM;  Location: [facility];  Service: Podiatry;  Laterality: Left;    FOOT AMPUTATION THROUGH  METATARSAL Left 2018    Procedure: AMPUTATION, FOOT, TRANSMETATARSAL;  Surgeon: Karla Wheeler DPM;  Location: [facility] OR;  Service: Podiatry;  Laterality: Left;  revisional transmetatarsal amputation, Left foot    IRRIGATION AND DEBRIDEMENT OF LOWER EXTREMITY Left 10/31/2018    Procedure: IRRIGATION AND DEBRIDEMENT, LOWER EXTREMITY;  Surgeon: Karla Wheeler DPM;  Location: [facility] OR;  Service: Podiatry;  Laterality: Left;    KIDNEY TRANSPLANT  2016    LEFT HEART CATHETERIZATION Left 2019    Procedure: CATHETERIZATION, HEART, LEFT;  Surgeon: Andrew Valdez MD;  Location: [facility] CATH LAB;  Service: Cardiology;  Laterality: Left;    LEG AMPUTATION THROUGH KNEE      right LE, started as nail puncture leading to diabetic ulcer    SKIN FULL THICKNESS GRAFT Left 10/7/2019    Procedure: APPLICATION, GRAFT, SKIN, FULL-THICKNESS;  Surgeon: Lenard Alarcon MD;  Location: [facility] OR [facility] FLR;  Service: Plastics;  Laterality: Left;    SURGICAL REMOVAL OF LESION OF FACE Right 10/7/2019    Procedure: EXCISION, LESION, FACE;  Surgeon: Lenard Alarcon MD;  Location: [facility] OR 2ND FLR;  Service: Plastics;  Laterality: Right;       Family History   Problem Relation Age of Onset    Cancer Father     Diabetes Father     Heart failure Father     Stroke Father     Heart failure Mother     Kidney disease Neg Hx        Social History     Socioeconomic History    Marital status: Single   Occupational History    Occupation: Disabled     Employer: DISABLED   Tobacco Use    Smoking status: Former     Packs/day: 1.00     Years: 40.00     Pack years: 40.00     Types: Cigarettes     Quit date: 2013     Years since quittin.6    Smokeless tobacco: Never   Substance and Sexual Activity    Alcohol use: Yes     Alcohol/week: 6.0 standard drinks     Types: 6 Cans of beer per week     Comment: seldom    Drug use: No    Sexual activity: Never   Social History Narrative    Lives alone. Retired from  construction and various jobs, now retired. Would like to return to work PT to alleviate boredom.             Physical Exam  General: Well nourished    Airway:  Mallampati: II   Mouth Opening: Normal  TM Distance: 4 - 6 cm  Tongue: Normal  Neck ROM: Normal ROM    Dental:  In tact    Chest/Lungs:  Clear to auscultation, Normal Respiratory Rate    Heart:  Rate: Normal  Rhythm: Regular Rhythm  Sounds: Normal          Anesthesia Plan  Type of Anesthesia, risks & benefits discussed:    Anesthesia Type: general  Intra-op Monitoring Plan: standard ASA monitors  Post Op Pain Control Plan: per primary service following discharge from PACU  Induction:  IV  Informed Consent: Informed consent signed with the Patient and all parties understand the risks and agree with anesthesia plan.  All questions answered.   ASA Score: 3 Emergent  Day of Surgery Review of History & Physical: I have interviewed and examined the patient. I have reviewed the patient's H&P dated:     Ready For Surgery From Anesthesia Perspective.       .      Chemistry        Component Value Date/Time     (L) 09/15/2022 0452    K 4.0 09/15/2022 0452    CL 96 09/15/2022 0452    CO2 20 (L) 09/15/2022 0452    BUN 21 09/15/2022 0452    CREATININE 1.6 (H) 09/15/2022 0452     (H) 09/15/2022 0452        Component Value Date/Time    CALCIUM 7.9 (L) 09/15/2022 0452    ALKPHOS 402 (H) 09/15/2022 0452     (H) 09/15/2022 0452    ALT 94 (H) 09/15/2022 0452    BILITOT 1.3 (H) 09/15/2022 0452    ESTGFRAFRICA 47 (A) 08/15/2021 1831    EGFRNONAA 41 (A) 08/15/2021 1831        Lab Results   Component Value Date    WBC 7.84 09/15/2022    HGB 9.3 (L) 09/15/2022    HCT 28.8 (L) 09/15/2022    MCV 89 09/15/2022     09/15/2022     Sinus rhythm with 1st degree A-V block   Low voltage QRS   Prolonged QT   Abnormal ECG   When compared with ECG of 30-AUG-2022 15:01,   No significant change was found   Confirmed by NAA DOWNEY MD ([id number]) on 8/31/2022 4:21:12 PM      Echo 8/4/22:   The left ventricle is normal in size with moderate concentric hypertrophy and normal systolic function.   The estimated ejection fraction is 60%.   Normal left ventricular diastolic function.   Normal right ventricular size with normal right ventricular systolic function.   Mild tricuspid regurgitation.   Normal central venous pressure (3 mmHg).   The estimated PA systolic pressure is 29 mmHg.           Heart Cath 7/2/19:  LVEDP (Pre): 10 mmHg   LVEDP (Post): 10 mmHg   Ejection Fraction: 60%   Wall Motion: akinetic in the apical wall and severely hypokinetic in the inferoapical wall   No significant MR noted   No significant gradient noted on pullback from LV    Angiographic Results       Diagnostic:          Patient has a right dominant coronary artery.        - Left Main Coronary Artery:              The LM has luminal irregularities. There is LISBETH 3 flow.        - Left Anterior Descending Artery:              The proximal LAD has a 50% stenosis. There is LISBETH 3 flow.                      Lesion Details:  Heavy calcification.              The mid LAD has a 50% stenosis. There is LISBETH 3 flow.                      Lesion Details:  Heavy calcification.              The distal LAD. There is LISBETH 0 flow in apical segment.  Apical LAD is occluded (wrap around LAD based on review of old angiogram films).  Distal LAD before apical occlusion is tortuous and small with diffuse disease.          - D1:              The D1 has luminal irregularities. There is LISBETH 3 flow.        - Left Circumflex Artery:              The LCX has luminal irregularities. There is LISBETH 3 flow.      - OM1:              The OM1 is normal. There is LISBETH 3 flow.      - Ramus:              The ramus has luminal irregularities. There is LISBETH 3 flow.          - Right Coronary Artery:              The RCA. There is LISBETH 3 flow. RCA is very tortuous and has diffuse nonobstructive disease.        - Right Common Iliac Artery:               The right BROOKE was not studied.      - Right Internal Iliac Artery:              The right IIA was not studied.      - Right External Iliac Artery:              The right EIA was not studied.      - Right Common Femoral Artery:              The right CFA has a 30% stenosis. The remaining portion of the vessel is diffusely diseased (75).                      Lesion Details:  Heavy calcification.      - Right Superficial Femoral Artery:              The ostial right SFA is occluded (100). There is LISBETH 0 flow.                      Lesion Details:  Heavy calcification.      - Right Profunda Femoral Artery:              The right PFA is diffusely diseased (75).                      Lesion Details:  Heavy calcification.

## 2022-09-15 NOTE — ASSESSMENT & PLAN NOTE
-Orthopedic Surgery following   -IV antibiotics   -NWB LLE  -scheduled for removal of the external fixator and debridement of osteomyelitis on tomorrow   -analgesia as needed

## 2022-09-16 NOTE — ASSESSMENT & PLAN NOTE
Patient's FSGs are uncontrolled due to hyperglycemia on current medication regimen.  Last A1c reviewed-   Lab Results   Component Value Date    HGBA1C 7.4 (H) 08/04/2022     Most recent fingerstick glucose reviewed-   Recent Labs   Lab 09/16/22  0759 09/16/22  1124 09/16/22  1236 09/16/22  1547   POCTGLUCOSE 311* 275* 265* 262*     Current correctional scale  Low  Maintain anti-hyperglycemic dose as follows-   Antihyperglycemics (From admission, onward)    Start     Stop Route Frequency Ordered    09/16/22 1215  insulin detemir U-100 pen 16 Units         -- SubQ 2 times daily 09/16/22 1112    09/16/22 1213  insulin aspart U-100 pen 1-10 Units         -- SubQ Before meals & nightly PRN 09/16/22 1113        Levemir added- changed to bid dosing and moderate sliding scale  Hold Oral hypoglycemics while patient is in the hospital.

## 2022-09-16 NOTE — ASSESSMENT & PLAN NOTE
9/14/22:Surgery was post-poned 2/2 hyponatremia -Na 126. Corrected Na to glucose is 129. . CXR- some cephalization. Abd u/s showed- cirrhosis changes to liver. Hyponatremia likely 2/2 hypervolemia and Cirrhosis. Will add IV lasix. Add Levemir for hyperglycemia. Tacrolimus level 10- will consult nephrology for renal transplant and hyponatremia   -9/15/22- Na corrected to 132- BNP elevated - Lasix given   9/16/22 - Na corrected 134

## 2022-09-16 NOTE — PT/OT/SLP PROGRESS
Occupational Therapy      Patient Name:  Lavelle Ladd   MRN:  [id number]    Eval initiated via chart review. Pt interview completed nurse pt with nursing care. Will complete on next visit.  9/16/2022  Lola Quintero OT  [id number]
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
## 2022-09-16 NOTE — SUBJECTIVE & OBJECTIVE
Interval History:  See Hospital Course    Review of Systems   Constitutional:  Positive for activity change and fatigue. Negative for appetite change, chills, diaphoresis and fever.   HENT:  Negative for congestion, nosebleeds, sore throat and trouble swallowing.    Eyes:  Negative for pain, discharge and visual disturbance.   Respiratory:  Negative for apnea, cough, chest tightness, shortness of breath, wheezing and stridor.    Cardiovascular:  Negative for chest pain, palpitations and leg swelling.   Gastrointestinal:  Negative for abdominal distention, abdominal pain, blood in stool, constipation, diarrhea, nausea and vomiting.   Endocrine: Negative for cold intolerance and heat intolerance.   Genitourinary:  Negative for difficulty urinating, dysuria, flank pain, frequency and urgency.   Musculoskeletal:  Positive for arthralgias (left ankle pain), gait problem and joint swelling. Negative for back pain, myalgias, neck pain and neck stiffness.   Skin:  Negative for rash and wound.   Allergic/Immunologic: Negative for food allergies and immunocompromised state.   Neurological:  Negative for dizziness, seizures, syncope, facial asymmetry, weakness, light-headedness and headaches.   Hematological:  Negative for adenopathy.   Psychiatric/Behavioral:  Negative for agitation, behavioral problems and confusion. The patient is not nervous/anxious.    Objective:     Vital Signs (Most Recent):  Temp: 97.6 °F (36.4 °C) (09/16/22 1552)  Pulse: 69 (09/16/22 1552)  Resp: 16 (09/16/22 1552)  BP: (!) 102/56 (09/16/22 1552)  SpO2: 95 % (09/16/22 1552)   Vital Signs (24h Range):  Temp:  [96 °F (35.6 °C)-97.6 °F (36.4 °C)] 97.6 °F (36.4 °C)  Pulse:  [69-84] 69  Resp:  [16-20] 16  SpO2:  [92 %-95 %] 95 %  BP: (102-123)/(56-57) 102/56     Weight: 102 kg (224 lb 13.9 oz)  Body mass index is 31.36 kg/m².    Intake/Output Summary (Last 24 hours) at 9/16/2022 1713  Last data filed at 9/16/2022 1625  Gross per 24 hour   Intake 320 ml    Output 2150 ml   Net -1830 ml      Physical Exam  Vitals and nursing note reviewed.   Constitutional:       General: He is not in acute distress.     Appearance: He is well-developed. He is not diaphoretic.   HENT:      Head: Normocephalic and atraumatic.      Nose: Nose normal.   Eyes:      General: No scleral icterus.     Conjunctiva/sclera: Conjunctivae normal.   Cardiovascular:      Rate and Rhythm: Normal rate and regular rhythm.      Heart sounds: Normal heart sounds. No murmur heard.    No friction rub. No gallop.   Pulmonary:      Effort: Pulmonary effort is normal. No respiratory distress.      Breath sounds: Normal breath sounds. No stridor. No wheezing or rales.   Chest:      Chest wall: No tenderness.   Abdominal:      General: Bowel sounds are normal. There is no distension.      Palpations: Abdomen is soft.      Tenderness: There is no abdominal tenderness. There is no guarding or rebound.   Genitourinary:     Comments: Deferred   Musculoskeletal:         General: No tenderness or deformity. Normal range of motion.      Cervical back: Normal range of motion and neck supple.      Comments: Left TMA with external fixator in place to distal Tib/fib.    Pin site with open wound and foul drainage   Right BKA   Skin:     General: Skin is warm and dry.      Coloration: Skin is not pale.      Findings: No erythema or rash.   Neurological:      Mental Status: He is alert and oriented to person, place, and time.      Cranial Nerves: No cranial nerve deficit.      Motor: No abnormal muscle tone.      Coordination: Coordination normal.   Psychiatric:         Behavior: Behavior normal.         Thought Content: Thought content normal.       Significant Labs: All pertinent labs within the past 24 hours have been reviewed.  CBC:   Recent Labs   Lab 09/15/22  0452 09/16/22  0435   WBC 7.84 8.04   HGB 9.3* 8.8*   HCT 28.8* 27.2*    225     CMP:   Recent Labs   Lab 09/15/22  0452 09/16/22  0755   * 132*    K 4.0 3.5   CL 96 96   CO2 20* 24   * 228*   BUN 21 21   CREATININE 1.6* 1.7*   CALCIUM 7.9* 8.3*   PROT 5.2* 5.4*   ALBUMIN 1.8* 1.8*   BILITOT 1.3* 0.9   ALKPHOS 402* 419*   * 200*   ALT 94* 105*   ANIONGAP 12 12       Significant Imaging: I have reviewed all pertinent imaging results/findings within the past 24 hours.
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
## 2022-09-16 NOTE — PROGRESS NOTES
[facility])  Orthopedics  Progress Note    Patient Name: Lavelle Ladd  MRN: [id number]  Admission Date: 9/13/2022  Hospital Length of Stay: 2 days  Attending Provider: Jean Blair, *  Primary Care Provider: Primary Doctor No  Follow-up For: Procedure(s) (LRB):  INCISION AND DRAINAGE,BONE ABSCESS OR OSTEOMYELITIS,FOOT (Left)  REMOVAL, HARDWARE, LOWER EXTREMITY (Left)    Post-Operative Day: 2 Days Post-Op  Subjective:     Principal Problem: Left distal tibia/fibula fracture with infected external fixator    Principal Orthopedic Problem: Left distal tibia/fibula fracture with infected external fixator    Interval History: Lavelle Ladd is a 67-year-old male with infected hardware to his left lower extremity.  He was scheduled for removal of external fixator and debridement of osteomyelitis of the left calcaneus yesterday and the day before, but surgery has been delayed secondary to his hyponatremia.  Patient is resting comfortably in bed.  No new complaints at this time    Review of patient's allergies indicates:  No Known Allergies    Current Facility-Administered Medications   Medication    acetaminophen tablet 650 mg    albuterol-ipratropium 2.5 mg-0.5 mg/3 mL nebulizer solution 3 mL    aluminum-magnesium hydroxide-simethicone 200-200-20 mg/5 mL suspension 30 mL    carvediloL tablet 12.5 mg    cefepime in dextrose 5 % IVPB 2 g    dextrose 10% bolus 125 mL    dextrose 10% bolus 250 mL    furosemide injection 40 mg    gabapentin capsule 100 mg    glucagon (human recombinant) injection 1 mg    glucose chewable tablet 16 g    glucose chewable tablet 24 g    HYDROcodone-acetaminophen  mg per tablet 1 tablet    insulin aspart U-100 pen 0-5 Units    insulin detemir U-100 pen 25 Units    linaCLOtide capsule 145 mcg    linezolid 600 mg/300 mL IVPB 600 mg    magnesium oxide tablet 400 mg    magnesium oxide tablet 800 mg    magnesium oxide tablet 800 mg    melatonin tablet 6 mg    morphine  "injection 2 mg    mycophenolate capsule 250 mg    naloxone 0.4 mg/mL injection 0.02 mg    ondansetron injection 4 mg    prochlorperazine injection Soln 5 mg    sertraline tablet 25 mg    simethicone chewable tablet 80 mg    sodium chloride 0.9% flush 10 mL    tacrolimus capsule 1 mg     Objective:     Vital Signs (Most Recent):  Temp: 96.3 °F (35.7 °C) (09/16/22 0729)  Pulse: 73 (09/16/22 0729)  Resp: 16 (09/16/22 0729)  BP: (!) 123/56 (09/16/22 0729)  SpO2: (!) 94 % (09/16/22 0729)   Vital Signs (24h Range):  Temp:  [96.3 °F (35.7 °C)-99.6 °F (37.6 °C)] 96.3 °F (35.7 °C)  Pulse:  [73-84] 73  Resp:  [16-19] 16  SpO2:  [92 %-94 %] 94 %  BP: (120-123)/(56-59) 123/56     Weight: 102 kg (224 lb 13.9 oz)  Height: 5' 11" (180.3 cm)  Body mass index is 31.36 kg/m².      Intake/Output Summary (Last 24 hours) at 9/16/2022 0756  Last data filed at 9/16/2022 0245  Gross per 24 hour   Intake 320 ml   Output 1350 ml   Net -1030 ml       Ortho/SPM Exam  Left lower extremity:  Transmetatarsal amputation noted  External fixator is intact   Open wound at the calcaneal pin sites that goes all the way through to the other side  Calcaneus moves freely back and forth along the pin   Proximal pins in the tibia are well aligned with no signs of infection   Calf and compartments are soft and compressible   Motor exam normal   Sensation and pulses decreased, but patient reports this is his baseline    GEN: Well developed, well nourished male. AAOX3. No acute distress.   Head: Normocephalic, atraumatic.   Eyes: [place]  Neck: Trachea is midline, no adenopathy  Resp: Breathing unlabored.  Neuro: Motor function normal, Cranial nerves intact  Psych: Mood and affect appropriate.      Significant Labs: CBC:   Recent Labs   Lab 09/15/22  0452 09/16/22  0435   WBC 7.84 8.04   HGB 9.3* 8.8*   HCT 28.8* 27.2*    225     CMP:   Recent Labs   Lab 09/15/22  0452   *   K 4.0   CL 96   CO2 20*   *   BUN 21   CREATININE 1.6*   CALCIUM " 7.9*   PROT 5.2*   ALBUMIN 1.8*   BILITOT 1.3*   ALKPHOS 402*   *   ALT 94*   ANIONGAP 12     POCT Glucose:   Recent Labs   Lab 09/15/22  0838 09/15/22  1136 09/16/22  0352   POCTGLUCOSE 258* 325* 230*     All pertinent labs within the past 24 hours have been reviewed.    Significant Imaging: None    Assessment/Plan:     Active Diagnoses:    Diagnosis Date Noted POA    Infection of deep incisional surgical site after procedure [T81.42XA] 09/15/2022 Unknown    Acute osteomyelitis of left calcaneus [M86.172] 09/13/2022 Yes    Hyponatremia [E87.1] 08/30/2022 Yes    Stage 3 chronic kidney disease [N18.30]  Yes    Long term current use of immunosuppressive drug [Z79.899] 07/06/2018 Not Applicable    Kidney transplant recipient [Z94.0] 04/07/2017 Not Applicable    H/O amputation [Z89.9] 12/01/2016 Yes    Chronic obstructive pulmonary disease [J44.9] 09/12/2016 Yes    Coronary artery disease of native artery of native heart with stable angina pectoris [I25.118] 07/01/2016 Yes    Type 2 diabetes mellitus with hyperglycemia, with long-term current use of insulin [E11.65, Z79.4]  Not Applicable    Essential hypertension [I10] 02/20/2015 Yes      Problems Resolved During this Admission:     Assessment:   67-year-old male with left calcaneal osteomyelitis in the setting of an external fixator for distal tibia/fibula fractures    Plan:   Patient will continue to receive IV antibiotics   NWB LLE  Awaiting morning lab results to determine if patient will be cleared by Anesthesia.  When these results come through we will discuss with them and determine whether we should proceed with surgery this evening or delay further    Tam Mcdermott PA-C  Orthopedics  [facility] - Telemetry ([facility])

## 2022-09-16 NOTE — ASSESSMENT & PLAN NOTE
Appears stable   -Cr 1.8> 1.6> 1.7  Avoid nephrotoxic medications   Monitor   -Nephrology following

## 2022-09-17 NOTE — PROGRESS NOTES
Due to lack of OR time at [facility] tonight case has been post-poned to tomorrow at 0830. I discussed this with patient and he signed consent. He said he is not mentally ready for an amputation at this time

## 2022-09-17 NOTE — TRANSFER OF CARE
"Anesthesia Transfer of Care Note    Patient: Lavelle Ladd    Procedure(s) Performed: Procedure(s) (LRB):  REMOVAL, EXTERNAL FIXATION DEVICE (Left)  INCISION AND DRAINAGE, LOWER EXTREMITY (Left)    Patient location: PACU    Anesthesia Type: general    Transport from OR: Transported from OR on room air with adequate spontaneous ventilation    Post pain: adequate analgesia    Post assessment: no apparent anesthetic complications    Post vital signs: stable    Level of consciousness: awake    Nausea/Vomiting: no nausea/vomiting    Complications: none    Transfer of care protocol was followed      Last vitals:   Visit Vitals  BP (!) 93/52   Pulse 72   Temp 36.4 °C (97.5 °F)   Resp 18   Ht 5' 11" (1.803 m)   Wt 102 kg (224 lb 13.9 oz)   SpO2 95%   BMI 31.36 kg/m²     "
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
## 2022-09-17 NOTE — OP NOTE
DATE OF SURGERY:  09/17/2022    PREOPERATIVE DIAGNOSIS:     Left distal tibia/fibula nonunion  Presumed left calcaneal osteomyelitis  Severe bilateral calcaneal pin site infections.   Retained external fixator    POSTOPERATIVE DIAGNOSIS:     Same    PROCEDURE:     Removal of external fixator  Saucerization of calcaneal osteomyelitis and I&D of hindfoot  Placement of antibiotic beads  4.  Wound vac placement to leg  5.  Fluoroscopy exam under anesthesia demonstrating unhealed distal 1/3 tibia/fibula fractures - gross motion at fracture site    OPERATING PHYSICIAN:  Kathleen Zazueta MD    ASSISTANT:  None    COMPLICATIONS:  None      TOURNQIUET:  8 @ 250mmHg    SPECIMENS:  Swab cultures to Microbiology. Calcaneus bone sent to pathology to evaluate for osteomyelitis    IMPLANTS:  [employer] medical beads    CLINICAL INDICATIONS:   Lavelle Ladd is a 67 y.o. male s/p L sided closed reduction distal 1/3 tibia-fibular fractures on 8/4/22 now with displacement in ex fix and gross infection of ex fix pin sites and calcaneus bone.  Patient likely will need a BKA. This is an attempt at infection control.    PROCEDURE IN DETAIL:  Prior to entering the operating room, informed consent was obtained.  The risks and benefits were discussed including but not limited to continued infection, inconclusive biopsy, nerve or vessel injury, fulminate infection, need for amputation, coma, death, blood clot, and need for further procedures.  The patient gave informed consent. The site was marked and verified as the left leg    The patient was brought into the operating room and placed under general anesthesia. A time out was performed confirming the correct extremity. Antibiotics were given per his floor schedule. The external fixator was removed without event. I could see through his calcaneal bone to the other side of the operating room.  Fluoroscopy exam under anesthesia demonstrated unhealed distal 1/3 tibia/fibula fractures - gross motion  at fracture sites.    The patient was then prepped and draped in a sterile fashion. The limb was elevated for exsanguination and the tourniquet was inflated.  My attention was first turned to the lateral calcaneal pin wound which tracked to bone and measured 2.5X 3 cm.  I used a knife to debride the necrotic tissue. I used a curette to debride the soft tissue and bone.      My attention was then turned to the medial side.   The wound measured 4X5cm.  I used a knife to debride the necrotic tissue. I used a curette to debride the soft tissue and bone. The limb was foul smelling. This was an excisional debridement.  I obtained a piece of calcaneal bone for biopsy to evaluate for osteomyelitis.     Swabs were sent for culture.  All purulent and necrotic matter was removed without difficulty.  The wounds were then irrigated using 3 L of normal saline.   Once this was completed, the tourniquet was dropped.  Hemostasis was obtained with electrocautery.  I used a doppler and could not find a DP or PT pulse.    I then made antibiotic impregnated beads (vancomycin 1g) and placed them in the wound.      I then placed a wound vac with a Y connector with 2 sponges into the wound.  The wound vac was placed @ 125mmHg. The area was placed into a splint that was windowed, with Xeroform (over tibial pin sites), 4x4s, cast padding, and an Ace.  The patient was awakened from anesthesia without incident and brought to the recovery room in stable condition.      POST-OP PLAN:  WB Status:  NWB LLE  Physical therapy: daily  OT: we have ordered a posterior slab splint to utilize instead of a splint as this would stabilize fracture and ease wound care/wound vac changes as well as provide assessment of skin on limb.  DVT prophylaxis: okay to resume tomorrow. Would recommend chemoprophylaxis while NWB  Antibiotics: per medicine team. Cultures obtained today. Bone biopsy to evaluate for osteomyelitis obtained today  Pain control: per medicine  team  Dressing: Patient will need QOD WV changes by wound care team.   Disposition: defer to trauma team who will assume care on Monday.  Patient will most likely need a BKA. Would recommend vascular studies to assess perfusion of limb prior to BKA.   If questions call [phone] and ask for Dr. Kathleen Zazueta.

## 2022-09-17 NOTE — ASSESSMENT & PLAN NOTE
Ortho consulted  Plans for surgical intervention   NPO after MN  Empiric vanc and cefepime  Hardware in place  Blood cultures pending      9/14/22: c/o of left ankle pain-controlled. Pt was scheduled for surgery, however, surgery was post-poned 2/2 hyponatremia -Na 126. WBC normal, afebrile. Blood cultures show NGTD. .3. cont IV Abx  09/15/22- removal of the external fixator and debridement of osteomyelitis planned for today- procedure postponed due to hyponatremia- Lasix given - Nephrology following   9/16/22 - procedure postponed due to hyperglycemia

## 2022-09-17 NOTE — ASSESSMENT & PLAN NOTE
Hold cellcept due to active infection  Cont tacrolimus  Check tac level    9/14/22: Consult nephrology   Slight bump in creatinine to 1.7. Lasix dose held. Monitoring

## 2022-09-17 NOTE — ASSESSMENT & PLAN NOTE
-Orthopedic Surgery following   -IV antibiotics   -NWB LLE  -scheduled for removal of the external fixator and debridement of osteomyelitis on tomorrow   -analgesia as needed   S/p - removal of external fixator
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
## 2022-09-17 NOTE — SUBJECTIVE & OBJECTIVE
Interval History:  See Hospital Course    Review of Systems   Constitutional:  Positive for activity change and fatigue. Negative for appetite change, chills, diaphoresis and fever.   HENT:  Negative for congestion, nosebleeds, sore throat and trouble swallowing.    Eyes:  Negative for pain, discharge and visual disturbance.   Respiratory:  Negative for apnea, cough, chest tightness, shortness of breath, wheezing and stridor.    Cardiovascular:  Negative for chest pain, palpitations and leg swelling.   Gastrointestinal:  Negative for abdominal distention, abdominal pain, blood in stool, constipation, diarrhea, nausea and vomiting.   Endocrine: Negative for cold intolerance and heat intolerance.   Genitourinary:  Negative for difficulty urinating, dysuria, flank pain, frequency and urgency.   Musculoskeletal:  Positive for arthralgias (left ankle pain), gait problem and joint swelling. Negative for back pain, myalgias, neck pain and neck stiffness.   Skin:  Positive for wound. Negative for rash.   Allergic/Immunologic: Negative for food allergies and immunocompromised state.   Neurological:  Negative for dizziness, seizures, syncope, facial asymmetry, weakness, light-headedness and headaches.   Hematological:  Negative for adenopathy.   Psychiatric/Behavioral:  Negative for agitation, behavioral problems and confusion. The patient is not nervous/anxious.    Objective:     Vital Signs (Most Recent):  Temp: 98 °F (36.7 °C) (09/17/22 1217)  Pulse: 74 (09/17/22 1217)  Resp: 18 (09/17/22 1217)  BP: (!) 96/58 (09/17/22 1050)  SpO2: (!) 94 % (09/17/22 1217)   Vital Signs (24h Range):  Temp:  [97 °F (36.1 °C)-98.2 °F (36.8 °C)] 98 °F (36.7 °C)  Pulse:  [69-77] 74  Resp:  [14-22] 18  SpO2:  [93 %-100 %] 94 %  BP: ()/(47-86) 96/58     Weight: 102 kg (224 lb 13.9 oz)  Body mass index is 31.36 kg/m².    Intake/Output Summary (Last 24 hours) at 9/17/2022 1557  Last data filed at 9/17/2022 1045  Gross per 24 hour   Intake  330 ml   Output 850 ml   Net -520 ml      Physical Exam  Vitals and nursing note reviewed.   Constitutional:       General: He is not in acute distress.     Appearance: He is well-developed. He is not diaphoretic.   HENT:      Head: Normocephalic and atraumatic.      Nose: Nose normal.   Eyes:      General: No scleral icterus.     Conjunctiva/sclera: Conjunctivae normal.   Cardiovascular:      Rate and Rhythm: Normal rate and regular rhythm.      Heart sounds: Normal heart sounds. No murmur heard.    No friction rub. No gallop.   Pulmonary:      Effort: Pulmonary effort is normal. No respiratory distress.      Breath sounds: Normal breath sounds. No stridor. No wheezing or rales.   Chest:      Chest wall: No tenderness.   Abdominal:      General: Bowel sounds are normal. There is no distension.      Palpations: Abdomen is soft.      Tenderness: There is no abdominal tenderness. There is no guarding or rebound.   Genitourinary:     Comments: Deferred   Musculoskeletal:         General: No tenderness or deformity. Normal range of motion.      Cervical back: Normal range of motion and neck supple.      Comments:   Right BKA      Right Lower Extremity: Right leg is amputated above knee.   Feet:      Comments: Wound to left calcaneous area - connected to wound vac. Post surgical dressing dry and intact.   Skin:     General: Skin is warm and dry.      Coloration: Skin is not pale.      Findings: No erythema or rash.   Neurological:      Mental Status: He is alert and oriented to person, place, and time.      Cranial Nerves: No cranial nerve deficit.      Motor: No abnormal muscle tone.      Coordination: Coordination normal.   Psychiatric:         Behavior: Behavior normal.         Thought Content: Thought content normal.       Significant Labs: All pertinent labs within the past 24 hours have been reviewed.  CBC:   Recent Labs   Lab 09/16/22  0435   WBC 8.04   HGB 8.8*   HCT 27.2*        CMP:   Recent Labs   Lab  09/16/22  0755 09/17/22  1317   * 133*   K 3.5 3.4*   CL 96 95   CO2 24 24   * 220*   BUN 21 21   CREATININE 1.7* 1.7*   CALCIUM 8.3* 8.9   PROT 5.4*  --    ALBUMIN 1.8*  --    BILITOT 0.9  --    ALKPHOS 419*  --    *  --    *  --    ANIONGAP 12 14       Significant Imaging: I have reviewed all pertinent imaging results/findings within the past 24 hours.

## 2022-09-17 NOTE — PLAN OF CARE
Pt resting on bed s/p I&D lt le and remoaval of Ex-Fix performed under general anesthesia by Dr. Zazueta. Respirations even and unlabored on room air with O2 sats of 97%. VSS. Will cont to monitor. See flow sheet for detailed assessment.

## 2022-09-17 NOTE — ANESTHESIA POSTPROCEDURE EVALUATION
Anesthesia Post Evaluation    Patient: Lavelle Ladd    Procedure(s) Performed: Procedure(s) (LRB):  REMOVAL, EXTERNAL FIXATION DEVICE (Left)  INCISION AND DRAINAGE, LOWER EXTREMITY (Left)    Final Anesthesia Type: general      Patient location during evaluation: PACU  Patient participation: Yes- Able to Participate  Level of consciousness: awake and alert  Post-procedure vital signs: reviewed and stable  Pain management: adequate  Airway patency: patent  MARIA INES mitigation strategies: Extubation while patient is awake  PONV status at discharge: No PONV  Anesthetic complications: no      Cardiovascular status: hemodynamically stable  Respiratory status: spontaneous ventilation  Hydration status: euvolemic  Follow-up not needed.          Vitals Value Taken Time   BP 96/58 09/17/22 1050   Temp 36.7 °C (98 °F) 09/17/22 1217   Pulse 74 09/17/22 1217   Resp 18 09/17/22 1217   SpO2 94 % 09/17/22 1217         Event Time   Out of Recovery 09/17/2022 10:55:09         Pain/Shereen Score: Pain Rating Prior to Med Admin: 7 (9/16/2022  9:32 PM)  Shereen Score: 10 (9/17/2022 10:45 AM)

## 2022-09-17 NOTE — ASSESSMENT & PLAN NOTE
Patient's FSGs are uncontrolled due to hyperglycemia on current medication regimen.  Last A1c reviewed-   Lab Results   Component Value Date    HGBA1C 7.4 (H) 08/04/2022     Most recent fingerstick glucose reviewed-   Recent Labs   Lab 09/16/22 2123 09/17/22  0608 09/17/22  1026 09/17/22  1215   POCTGLUCOSE 236* 141* 138* 187*     Current correctional scale  Low  Maintain anti-hyperglycemic dose as follows-   Antihyperglycemics (From admission, onward)    Start     Stop Route Frequency Ordered    09/16/22 1215  insulin detemir U-100 pen 16 Units         -- SubQ 2 times daily 09/16/22 1112    09/16/22 1213  insulin aspart U-100 pen 1-10 Units         -- SubQ Before meals & nightly PRN 09/16/22 1113        Levemir added- changed to bid dosing and moderate sliding scale  Hold Oral hypoglycemics while patient is in the hospital.
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
## 2022-09-18 NOTE — ASSESSMENT & PLAN NOTE
Patient's FSGs are uncontrolled due to hyperglycemia on current medication regimen.  Last A1c reviewed-   Lab Results   Component Value Date    HGBA1C 7.4 (H) 08/04/2022     Most recent fingerstick glucose reviewed-   Recent Labs   Lab 09/17/22  1215 09/17/22  1603 09/17/22 2058 09/18/22  0549   POCTGLUCOSE 187* 299* 305* 296*     Current correctional scale  Low  Maintain anti-hyperglycemic dose as follows-   Antihyperglycemics (From admission, onward)    Start     Stop Route Frequency Ordered    09/18/22 2100  insulin detemir U-100 pen 20 Units         -- SubQ 2 times daily 09/18/22 1115    09/16/22 1213  insulin aspart U-100 pen 1-10 Units         -- SubQ Before meals & nightly PRN 09/16/22 1113        Levemir added- changed to bid dosing and moderate sliding scale - dose increased from 16 units to 20 Units  Hold Oral hypoglycemics while patient is in the hospital.
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
## 2022-09-18 NOTE — ASSESSMENT & PLAN NOTE
-Orthopedic Surgery following   -IV antibiotics  - Zyvox and Cefepime  -NWB LLE  DVT prophylaxis 24 hours after surgery   -Post op day 1 removal of the external fixator and debridement of osteomyelitis   -analgesia as needed   Cultures taken

## 2022-09-18 NOTE — ASSESSMENT & PLAN NOTE
Hold cellcept due to active infection  Cont tacrolimus  Check tac level    Nephrology following  Slight bump in creatinine to 1.7. On Lasix 40 mg IV bid  On Cellcept in progress

## 2022-09-18 NOTE — ASSESSMENT & PLAN NOTE
9/14/22: c/o of left ankle pain-controlled. Pt was scheduled for surgery, however, surgery was post-poned 2/2 hyponatremia -Na 126. WBC normal, afebrile. Blood cultures show NGTD. .3. cont IV Abx  09/15/22- removal of the external fixator and debridement of osteomyelitis planned for today- procedure postponed due to hyponatremia- Lasix given - Nephrology following   9/16/22 - procedure postponed due to hyperglycemia  9/17/22 - post op day 1 - ABX in progress

## 2022-09-18 NOTE — SUBJECTIVE & OBJECTIVE
Interval History:  Pt describes pain to the left foot.     Review of Systems   Constitutional:  Positive for activity change and fatigue. Negative for appetite change, chills, diaphoresis and fever.   HENT:  Negative for congestion, nosebleeds, sore throat and trouble swallowing.    Eyes:  Negative for pain, discharge and visual disturbance.   Respiratory:  Negative for apnea, cough, chest tightness, shortness of breath, wheezing and stridor.    Cardiovascular:  Negative for chest pain, palpitations and leg swelling.   Gastrointestinal:  Negative for abdominal distention, abdominal pain, blood in stool, constipation, diarrhea, nausea and vomiting.   Endocrine: Negative for cold intolerance and heat intolerance.   Genitourinary:  Negative for difficulty urinating, dysuria, flank pain, frequency and urgency.   Musculoskeletal:  Positive for arthralgias (left ankle pain), gait problem and joint swelling. Negative for back pain, myalgias, neck pain and neck stiffness.   Skin:  Positive for wound. Negative for rash.   Allergic/Immunologic: Negative for food allergies and immunocompromised state.   Neurological:  Negative for dizziness, seizures, syncope, facial asymmetry, weakness, light-headedness and headaches.   Hematological:  Negative for adenopathy.   Psychiatric/Behavioral:  Negative for agitation, behavioral problems and confusion. The patient is not nervous/anxious.    Objective:     Vital Signs (Most Recent):  Temp: 98.8 °F (37.1 °C) (09/18/22 0836)  Pulse: 72 (09/18/22 0836)  Resp: 18 (09/18/22 0836)  BP: (!) 128/56 (09/18/22 0859)  SpO2: (!) 93 % (09/18/22 1000)   Vital Signs (24h Range):  Temp:  [97.5 °F (36.4 °C)-98.8 °F (37.1 °C)] 98.8 °F (37.1 °C)  Pulse:  [66-74] 72  Resp:  [18-20] 18  SpO2:  [73 %-94 %] 93 %  BP: ()/() 128/56     Weight: 96.9 kg (213 lb 10 oz)  Body mass index is 29.79 kg/m².    Intake/Output Summary (Last 24 hours) at 9/18/2022 1111  Last data filed at 9/17/2022 1700  Gross  per 24 hour   Intake --   Output 500 ml   Net -500 ml      Physical Exam  Vitals and nursing note reviewed.   Constitutional:       General: He is not in acute distress.     Appearance: He is well-developed. He is not diaphoretic.   HENT:      Head: Normocephalic and atraumatic.      Nose: Nose normal.   Eyes:      General: No scleral icterus.     Conjunctiva/sclera: Conjunctivae normal.   Cardiovascular:      Rate and Rhythm: Normal rate and regular rhythm.      Heart sounds: Normal heart sounds. No murmur heard.    No friction rub. No gallop.   Pulmonary:      Effort: Pulmonary effort is normal. No respiratory distress.      Breath sounds: Normal breath sounds. No stridor. No wheezing or rales.   Chest:      Chest wall: No tenderness.   Abdominal:      General: Bowel sounds are normal. There is no distension.      Palpations: Abdomen is soft.      Tenderness: There is no abdominal tenderness. There is no guarding or rebound.   Genitourinary:     Comments: Deferred   Musculoskeletal:         General: No tenderness or deformity. Normal range of motion.      Cervical back: Normal range of motion and neck supple.      Comments:   Right BKA      Right Lower Extremity: Right leg is amputated above knee.   Feet:      Comments: Wound to left calcaneous area - connected to wound vac. Draining serosanguinous, cloudy drainage. Post surgical dressing dry and intact.   Skin:     General: Skin is warm and dry.      Coloration: Skin is not pale.      Findings: No erythema or rash.   Neurological:      Mental Status: He is alert and oriented to person, place, and time.      Cranial Nerves: No cranial nerve deficit.      Motor: No abnormal muscle tone.      Coordination: Coordination normal.   Psychiatric:         Behavior: Behavior normal.         Thought Content: Thought content normal.       Significant Labs: All pertinent labs within the past 24 hours have been reviewed.  CBC: No results for input(s): WBC, HGB, HCT, PLT in the  last 48 hours.  CMP:   Recent Labs   Lab 09/17/22  1317   *   K 3.4*   CL 95   CO2 24   *   BUN 21   CREATININE 1.7*   CALCIUM 8.9   ANIONGAP 14       Significant Imaging: I have reviewed all pertinent imaging results/findings within the past 24 hours.

## 2022-09-18 NOTE — ASSESSMENT & PLAN NOTE
Left TMA  Right BKA  Both amputation incision sites clean, healed, and intact     Ortho advising patient he may need a left BKA
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
## 2022-09-18 NOTE — PROCEDURES
"Lavelle Ladd is a 67 y.o. male patient.    Temp: 97.5 °F (36.4 °C) (09/18/22 1637)  Pulse: 71 (09/18/22 1637)  Resp: 18 (09/18/22 1637)  BP: (!) 123/57 (09/18/22 1637)  SpO2: (!) 93 % (09/18/22 1637)  Weight: 96.9 kg (213 lb 10 oz) (09/18/22 0033)  Height: 5' 11" (180.3 cm) (09/14/22 0506)    PICC  Date/Time: 9/18/2022 5:38 PM  Performed by: Neil Cr RN  Supervising provider: Onesimo Henriquez RN  Consent Done: Yes  Time out: Immediately prior to procedure a time out was called to verify the correct patient, procedure, equipment, support staff and site/side marked as required  Indications: med administration and vascular access  Anesthesia: local infiltration  Local anesthetic: lidocaine 1% without epinephrine  Anesthetic Total (mL): 3  Preparation: skin prepped with ChloraPrep  Skin prep agent dried: skin prep agent completely dried prior to procedure  Sterile barriers: all five maximum sterile barriers used - cap, mask, sterile gown, sterile gloves, and large sterile sheet  Hand hygiene: hand hygiene performed prior to central venous catheter insertion  Location details: right basilic  Catheter type: double lumen  Catheter size: 5 Fr  Catheter Length: 40cm    Ultrasound guidance: yes  Vessel Caliber: medium and patent, compressibility normal  Vascular Doppler: not done  Needle advanced into vessel with real time Ultrasound guidance.  Guidewire confirmed in vessel.  Sterile sheath used.  no esophageal manometryNumber of attempts: 1  Post-procedure: blood return through all ports, chlorhexidine patch and sterile dressing applied  Specimens: No  Implants: No  Assessment: placement verified by x-ray (see rad. report)  Complications: none  Comments: Do not use until placement verified by MD        Name Neil Cr RN  9/18/2022    "

## 2022-09-18 NOTE — PROGRESS NOTES
67M  Mult medical comorbidities  Prior R BKA  Prior L transmet amp    S/p ex fix L tib shaft fx 8.14  Now w/ open wound L foot, calc osteo  S/p ex fix removal 9.17    Doing OK  Dressing CDI  BCR  WV holding suction    POST-OP PLAN:  WB Status:  NWB LLE  Physical therapy: daily  OT: we have ordered a posterior slab splint to utilize instead of a splint as this would stabilize fracture and ease wound care/wound vac changes as well as provide assessment of skin on limb.  DVT prophylaxis: okay to resume POD1. Would recommend chemoprophylaxis while NWB  Antibiotics: per medicine team. Cultures obtained in OR. Bone biopsy to evaluate for osteomyelitis obtained today  Pain control: per medicine team  Dressing: Patient will need QOD [place] changes by wound care team.   Disposition: defer to trauma team who will assume care on Monday.  Patient will most likely need a BKA. Would recommend vascular studies to assess perfusion of limb prior to BKA.   If questions call [phone] and ask for Dr. Kathleen Zazueta.

## 2022-09-18 NOTE — PT/OT/SLP PROGRESS
"Occupational Therapy      Patient Name:  Lavelle Ladd   MRN:  [id number]    OT sanjay initiated via [facility] chart review. Patient not seen today at 7:58 AM secondary to pt refusal despite max encouragement. Pt agitated and stating "I've been up all night long. Can you give me a break?" Will follow-up on a later day.    9/18/2022  Yuli Solomon, OT   [id number]    "

## 2022-09-18 NOTE — ASSESSMENT & PLAN NOTE
9/14/22:Surgery was post-poned 2/2 hyponatremia -Na 126. Corrected Na to glucose is 129. . CXR- some cephalization. Abd u/s showed- cirrhosis changes to liver. Hyponatremia likely 2/2 hypervolemia and Cirrhosis. Will add IV lasix. Add Levemir for hyperglycemia. Tacrolimus level 10- will consult nephrology for renal transplant and hyponatremia   -9/15/22- Na corrected to 132- BNP elevated - Lasix given   9/17/22 - Na corrected 133
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
## 2022-09-19 NOTE — PLAN OF CARE
"PT REFUSED ALL THERAPY " IM NOT GOING TO DO THIS ." PT WILL BE D/C FROM P.T. TO  FOR MAINTENANCE.   "

## 2022-09-19 NOTE — ASSESSMENT & PLAN NOTE
Patient's FSGs are uncontrolled due to hyperglycemia on current medication regimen.  Last A1c reviewed-   Lab Results   Component Value Date    HGBA1C 7.4 (H) 08/04/2022     Most recent fingerstick glucose reviewed-   Recent Labs   Lab 09/18/22  1547 09/18/22  2101 09/19/22  0609 09/19/22  1229   POCTGLUCOSE 485* 435* 339* 377*     Current correctional scale  Low  Maintain anti-hyperglycemic dose as follows-   Antihyperglycemics (From admission, onward)    Start     Stop Route Frequency Ordered    09/19/22 0900  insulin detemir U-100 pen 30 Units         -- SubQ 2 times daily 09/19/22 0821    09/16/22 1213  insulin aspart U-100 pen 1-10 Units         -- SubQ Before meals & nightly PRN 09/16/22 1113        Levemir added- changed to bid dosing and moderate sliding scale - dose increased from 16 units to 20 Units  Hold Oral hypoglycemics while patient is in the hospital.

## 2022-09-19 NOTE — PT/OT/SLP EVAL
"Occupational Therapy   Evaluation and Discharge Note    Name: Lavelle Ladd  MRN: [id number]  Admitting Diagnosis:  Acute osteomyelitis of left calcaneus   Recent Surgery: Procedure(s) (LRB):  REMOVAL, EXTERNAL FIXATION DEVICE (Left)  INCISION AND DRAINAGE, LOWER EXTREMITY (Left) 2 Days Post-Op    Recommendations:     Discharge Recommendations: nursing facility, skilled, nursing facility, basic (however, pt refused therapy services and will be d/c to [facility])  Discharge Equipment Recommendations:  none  Barriers to discharge:  Decreased caregiver support    Assessment:     Lavelle Ladd is a 67 y.o. male with a medical diagnosis of Acute osteomyelitis of left calcaneus. At this time, patient is functioning at their prior level of function and does not require further acute OT services.     Plan:     During this hospitalization, patient does not require further acute OT services.  Please re-consult if situation changes.    Plan of Care Reviewed with: patient    Subjective     Chief Complaint: Back and LLE pain  Patient/Family Comments/goals: None reported. Pt stating "I'm not going to do therapy." Pt reported not getting out of bed since his d/c to [place] in early August.     Occupational Profile:  Living Environment: lives with girlfriend in a 1 story house with a ramp to enter.   Previous level of function: Pt reports (I) with dressing. Mod (I) with bathing using a bath bench and mobility using a wheelchair. Pt can t/f to wheelchair (I). Pt WC-bound but reports being bed-bound the last month. Pt has a RLE prosthesis that he does not most of the time.   Roles and Routines: unknown  Equipment Used at home:  wheelchair, bedside commode, bath bench, grab bar, power chair  Assistance upon Discharge: unknown    Pain/Comfort:  Pain Rating 1: 7/10  Location - Orientation 1: lower  Location 1: back  Pain Addressed 1: Reposition, Distraction  Pain Rating 2: 7/10  Location - Side 2: Left  Location 2: leg  Pain Addressed 2: " "Reposition, Distraction      Objective:     Communicated with: nurse Jay and epic chart review prior to session.  Patient found supine with peripheral IV, PICC line, telemetry, wound vac upon OT entry to room.    General Precautions: Standard, fall   Orthopedic Precautions:LLE non weight bearing   Braces: N/A  Respiratory Status: Room air     Occupational Performance:    Bed Mobility:    Patient completed Rolling/Turning to Right with stand by assistance  Patient completed Scooting/Bridging with stand by assistance  Patient completed Supine to Sit with stand by assistance  Patient completed Sit to Supine with stand by assistance    Cognitive/Visual Perceptual:  Cognitive/Psychosocial Skills:     -       Oriented to: Person, Place, Time, and Situation   -       Follows Commands/attention: able to follow one step command; however, refusing to participate  -       Safety awareness/insight to disability: impaired   -       Mood/Affect/Coping skills/emotional control: Flat affect and Agitated    Physical Exam:  Sensation:    -       Impaired  phantom pain  Upper Extremity Range of Motion:     -       Right Upper Extremity: WNL  -       Left Upper Extremity: WNL  Upper Extremity Strength:    -       Right Upper Extremity: WFL  -       Left Upper Extremity: WFL   Strength:    -       Right Upper Extremity: WNL  -       Left Upper Extremity: WNL  Good fine motor skills: able to unzip backpack (I)    AMPAC 6 Click ADL:  AMPAC Total Score: 17    Treatment & Education:  Patient educated on role of OT in acute setting and benefits of participation. Educated on techniques to use to increase independence and improve safe mobility. Educated on importance of EOB activity and calling for A to meet needs. Educated on and encouraged completion of B UE AROM therex throughout the day to tolerance to increase functional strength and activity tolerance. Pt stating "I know these exercises and I'm not going to do them." Pt then " "began to mock therapist during education.   Pt refusing to participate in OT, stating "I'm not going to do that", as well as mocking therapist during education.   Discharge OT at this time to [facility]. Recommends skilled nursing vs basic nursing; however, pt refused therapy services      Patient left HOB elevated with all lines intact, call button in reach, and nurse notified    GOALS:   Multidisciplinary Problems       Occupational Therapy Goals       Not on file                    History:     Past Medical History:   Diagnosis Date    DINORAH (acute kidney injury) 2016    Arthritis     CAD in native artery 2019    CHF (congestive heart failure)     Chronic obstructive pulmonary disease 2016    Coronary artery disease involving native coronary artery of native heart without angina pectoris 2016    CRI (chronic renal insufficiency) 2019     donor kidney transplant for DM 16     Induction with Thymo x3 and IV solumedrol to total 875mg  Kidney Biopsy  2016: 16 glomeruli, ACR type 1 AVR type 2, significant microcirculatory changes, c4d negative, No DSA, 5 to10% fibrosis. Treated with thymo x8 2016- no rejection      Diastolic heart failure     Encounter for blood transfusion     ESRD on RRT since 10/2013 10/29/2013    Biopsy proven diabetic nephropathy and lymphoplasmacytic interstitial infiltrate not c/w with AIN (ddx sjogrens or assoc with tamm-horsefall protein extravasation)     GERD (gastroesophageal reflux disease)     History of hepatitis C, s/p successful Rx w/ SVR12 - 2017    Completed 12 weeks harvoni w/ SVR    Hyperlipidemia     Hypertension     PAD (peripheral artery disease) 2019    PIC line (peripherally inserted central catheter) flush     Prophylactic immunotherapy     Proteinuria     PVD (peripheral vascular disease) 2017    RLE BKA CT 16 Extensive atherosclerotic disease of the aorta and mesenteric arteries.     Renal " hypertension     Type 2 diabetes mellitus with diabetic neuropathy, with long-term current use of insulin 12/1/2016    Vitamin B12 deficiency          Past Surgical History:   Procedure Laterality Date    AORTOGRAPHY WITH SERIALOGRAPHY N/A 6/14/2018    Procedure: LEFT LEG ANGIOGRAM;  Surgeon: Donal Mcdonald MD;  Location: [facility] CATH LAB;  Service: Vascular;  Laterality: N/A;    APPLICATION OF LARGE EXTERNAL FIXATION DEVICE TO TIBIA Left 8/4/2022    Procedure: APPLICATION, EXTERNAL FIXATION DEVICE, LARGE, TIBIA;  Surgeon: Good Villa MD;  Location: [facility] OR;  Service: Orthopedics;  Laterality: Left;    av bovine graft      Left UE    AV FISTULA PLACEMENT      left UE    CARDIAC CATHETERIZATION  02/2015    CLOSED REDUCTION OF INJURY OF TIBIA Left 8/4/2022    Procedure: CLOSED REDUCTION, TIBIA;  Surgeon: Good iVlla MD;  Location: [facility] OR;  Service: Orthopedics;  Laterality: Left;  Closed reduction left tibial and fibular shaft fractures    CLOSURE OF WOUND Left 9/24/2018    Procedure: CLOSURE, WOUND;  Surgeon: Karla Wheeler DPM;  Location: [facility] OR;  Service: Podiatry;  Laterality: Left;  Secondary Wound closure, extensive    CLOSURE OF WOUND Left 11/5/2018    Procedure: CLOSURE, WOUND;  Surgeon: Karla Wheeler DPM;  Location: [facility] OR;  Service: Podiatry;  Laterality: Left;    DEBRIDEMENT OF MULTIPLE METATARSAL BONES Left 11/5/2018    Procedure: DEBRIDEMENT, METATARSAL BONE, 2 OR MORE BONES;  Surgeon: Karla Wheeler DPM;  Location: [facility] OR;  Service: Podiatry;  Laterality: Left;    EXCISION OF SKIN Left 9/27/2019    Procedure: EXCISION, SKIN;  Surgeon: Lenard Alarcon MD;  [address]  Service: Plastics;  Laterality: Left;  Plastics set, NIMS monitor, ACell    FOOT AMPUTATION THROUGH METATARSAL Left 9/21/2018    Procedure: AMPUTATION, FOOT, TRANSMETATARSAL;  Surgeon: Karla Wheeler DPM;  Location: [facility] OR;  Service: Podiatry;  Laterality: Left;    FOOT AMPUTATION THROUGH  METATARSAL Left 10/31/2018    Procedure: AMPUTATION, FOOT, TRANSMETATARSAL;  Surgeon: Karla Wheeler DPM;  Location: [facility] OR;  Service: Podiatry;  Laterality: Left;    FOOT AMPUTATION THROUGH METATARSAL Left 11/5/2018    Procedure: AMPUTATION, FOOT, TRANSMETATARSAL;  Surgeon: Karla Wheeler DPM;  Location: [facility] OR;  Service: Podiatry;  Laterality: Left;  revisional transmetatarsal amputation, Left foot    IRRIGATION AND DEBRIDEMENT OF LOWER EXTREMITY Left 10/31/2018    Procedure: IRRIGATION AND DEBRIDEMENT, LOWER EXTREMITY;  Surgeon: Karla Wheeler DPM;  Location: [facility] OR;  Service: Podiatry;  Laterality: Left;    KIDNEY TRANSPLANT  05/21/2016    LEFT HEART CATHETERIZATION Left 7/21/2019    Procedure: CATHETERIZATION, HEART, LEFT;  Surgeon: Andrew Valdez MD;  Location: [facility] CATH LAB;  Service: Cardiology;  Laterality: Left;    LEG AMPUTATION THROUGH KNEE  2011    right LE, started as nail puncture leading to diabetic ulcer    SKIN FULL THICKNESS GRAFT Left 10/7/2019    Procedure: APPLICATION, GRAFT, SKIN, FULL-THICKNESS;  Surgeon: Lenard Alarcon MD;  Location: [address];  Service: Plastics;  Laterality: Left;    SURGICAL REMOVAL OF LESION OF FACE Right 10/7/2019    Procedure: EXCISION, LESION, FACE;  Surgeon: Lenard Alarcon MD;  [facility];  Service: Plastics;  Laterality: Right;       Time Tracking:     OT Date of Treatment: 09/19/22  OT Start Time: 0845  OT Stop Time: 0908  OT Total Time (min): 23 min    Billable Minutes:Evaluation 15  Therapeutic Activity 8    9/19/2022  Yuli Solomon OT

## 2022-09-19 NOTE — SUBJECTIVE & OBJECTIVE
Interval History:  See Hospital Course    Review of Systems   Constitutional:  Positive for activity change and fatigue. Negative for appetite change, chills, diaphoresis and fever.   HENT:  Negative for congestion, nosebleeds, sore throat and trouble swallowing.    Eyes:  Negative for pain, discharge and visual disturbance.   Respiratory:  Negative for apnea, cough, chest tightness, shortness of breath, wheezing and stridor.    Cardiovascular:  Negative for chest pain, palpitations and leg swelling.   Gastrointestinal:  Negative for abdominal distention, abdominal pain, blood in stool, constipation, diarrhea, nausea and vomiting.   Endocrine: Negative for cold intolerance and heat intolerance.   Genitourinary:  Negative for difficulty urinating, dysuria, flank pain, frequency and urgency.   Musculoskeletal:  Positive for arthralgias (left ankle pain), gait problem and joint swelling. Negative for back pain, myalgias, neck pain and neck stiffness.   Skin:  Positive for wound. Negative for rash.   Allergic/Immunologic: Negative for food allergies and immunocompromised state.   Neurological:  Negative for dizziness, seizures, syncope, facial asymmetry, weakness, light-headedness and headaches.   Hematological:  Negative for adenopathy.   Psychiatric/Behavioral:  Negative for agitation, behavioral problems and confusion. The patient is not nervous/anxious.    Objective:     Vital Signs (Most Recent):  Temp: 97.5 °F (36.4 °C) (09/19/22 1229)  Pulse: (!) 59 (09/19/22 1229)  Resp: 18 (09/19/22 1229)  BP: (!) 95/54 (09/19/22 1229)  SpO2: 95 % (09/19/22 1229)   Vital Signs (24h Range):  Temp:  [97.4 °F (36.3 °C)-97.8 °F (36.6 °C)] 97.5 °F (36.4 °C)  Pulse:  [59-78] 59  Resp:  [17-20] 18  SpO2:  [93 %-95 %] 95 %  BP: ()/(53-57) 95/54     Weight: 97.5 kg (214 lb 15.2 oz)  Body mass index is 29.98 kg/m².    Intake/Output Summary (Last 24 hours) at 9/19/2022 1500  Last data filed at 9/19/2022 1417  Gross per 24 hour    Intake 590.46 ml   Output 1155 ml   Net -564.54 ml      Physical Exam  Vitals and nursing note reviewed.   Constitutional:       General: He is not in acute distress.     Appearance: He is well-developed. He is not diaphoretic.   HENT:      Head: Normocephalic and atraumatic.      Nose: Nose normal.   Eyes:      General: No scleral icterus.     Conjunctiva/sclera: Conjunctivae normal.   Cardiovascular:      Rate and Rhythm: Normal rate and regular rhythm.      Heart sounds: Normal heart sounds. No murmur heard.    No friction rub. No gallop.   Pulmonary:      Effort: Pulmonary effort is normal. No respiratory distress.      Breath sounds: Normal breath sounds. No stridor. No wheezing or rales.   Chest:      Chest wall: No tenderness.   Abdominal:      General: Bowel sounds are normal. There is no distension.      Palpations: Abdomen is soft.      Tenderness: There is no abdominal tenderness. There is no guarding or rebound.   Genitourinary:     Comments: Deferred   Musculoskeletal:         General: No tenderness or deformity. Normal range of motion.      Cervical back: Normal range of motion and neck supple.      Comments:   Right BKA      Right Lower Extremity: Right leg is amputated above knee.   Feet:      Comments: Wound to left calcaneous area - connected to wound vac. Draining serosanguinous, cloudy drainage. Post surgical dressing dry and intact.   Skin:     General: Skin is warm and dry.      Coloration: Skin is not pale.      Findings: No erythema or rash.   Neurological:      Mental Status: He is alert and oriented to person, place, and time.      Cranial Nerves: No cranial nerve deficit.      Motor: No abnormal muscle tone.      Coordination: Coordination normal.   Psychiatric:         Behavior: Behavior normal.         Thought Content: Thought content normal.       Significant Labs: All pertinent labs within the past 24 hours have been reviewed.  CBC: No results for input(s): WBC, HGB, HCT, PLT in the  last 48 hours.  CMP: No results for input(s): NA, K, CL, CO2, GLU, BUN, CREATININE, CALCIUM, PROT, ALBUMIN, BILITOT, ALKPHOS, AST, ALT, ANIONGAP, EGFRNONAA in the last 48 hours.    Invalid input(s): ESTGFAFRICA    Significant Imaging: I have reviewed all pertinent imaging results/findings within the past 24 hours.

## 2022-09-19 NOTE — PLAN OF CARE
"OT sanjay completed. SBA for bed mobility. Pt refusing to participate in OT, stating "I'm not going to do that", as well as mocking therapist during education.   Discharge OT at this time to [facility]. Recommends skilled nursing vs basic nursing; however, pt refused therapy services.  "
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
## 2022-09-19 NOTE — PLAN OF CARE
A230/A230 LINDSEYShayna Ladd is a 67 y.o.male admitted on 2022 for Acute osteomyelitis of left calcaneus   Code Status: DNR MRN: [id number]   Review of patient's allergies indicates:  No Known Allergies  Past Medical History:   Diagnosis Date    DINORAH (acute kidney injury) 2016    Arthritis     CAD in native artery 2019    CHF (congestive heart failure)     Chronic obstructive pulmonary disease 2016    Coronary artery disease involving native coronary artery of native heart without angina pectoris 2016    CRI (chronic renal insufficiency) 2019     donor kidney transplant for DM 16     Induction with Thymo x3 and IV solumedrol to total 875mg  Kidney Biopsy  2016: 16 glomeruli, ACR type 1 AVR type 2, significant microcirculatory changes, c4d negative, No DSA, 5 to10% fibrosis. Treated with thymo x8 2016- no rejection      Diastolic heart failure     Encounter for blood transfusion     ESRD on RRT since 10/2013 10/29/2013    Biopsy proven diabetic nephropathy and lymphoplasmacytic interstitial infiltrate not c/w with AIN (ddx sjogrens or assoc with tamm-horsefall protein extravasation)     GERD (gastroesophageal reflux disease)     History of hepatitis C, s/p successful Rx w/ SVR12 - 2017    Completed 12 weeks harvoni w/ SVR    Hyperlipidemia     Hypertension     PAD (peripheral artery disease) 2019    PIC line (peripherally inserted central catheter) flush     Prophylactic immunotherapy     Proteinuria     PVD (peripheral vascular disease) 2017    RLE BKA CT 16 Extensive atherosclerotic disease of the aorta and mesenteric arteries.     Renal hypertension     Type 2 diabetes mellitus with diabetic neuropathy, with long-term current use of insulin 2016    Vitamin B12 deficiency       PRN meds    sodium chloride 0.9%, , PRN  acetaminophen, 650 mg, Q4H PRN  albuterol-ipratropium, 3 mL, Q6H PRN  aluminum-magnesium hydroxide-simethicone, 30 mL,  QID PRN  dextrose 10%, 12.5 g, PRN  dextrose 10%, 25 g, PRN  glucagon (human recombinant), 1 mg, PRN  glucose, 16 g, PRN  glucose, 24 g, PRN  HYDROcodone-acetaminophen, 1 tablet, Q6H PRN  insulin aspart U-100, 1-10 Units, QID (AC + HS) PRN  magnesium oxide, 800 mg, PRN  magnesium oxide, 800 mg, PRN  melatonin, 6 mg, Nightly PRN  morphine, 2 mg, Q4H PRN  naloxone, 0.02 mg, PRN  ondansetron, 4 mg, Q6H PRN  ondansetron, 4 mg, Q8H PRN  prochlorperazine, 5 mg, Q6H PRN  simethicone, 1 tablet, QID PRN  sodium chloride 0.9%, 10 mL, Q8H PRN        Problem: Infection  Goal: Absence of Infection Signs and Symptoms  Outcome: Ongoing, Progressing     Problem: Diabetes Comorbidity  Goal: Blood Glucose Level Within Targeted Range  Outcome: Ongoing, Progressing     Problem: Infection (Pneumonia)  Goal: Resolution of Infection Signs and Symptoms  Outcome: Ongoing, Progressing     Problem: Impaired Wound Healing  Goal: Optimal Wound Healing  Outcome: Ongoing, Progressing        Orientation: oriented x 4     O2 Device (Oxygen Therapy): room air  Lead Monitored: Lead II Rhythm: normal sinus rhythm    Cardiac/Telemetry Box Number: [id number]  VTE Required Core Measure: Pharmacological prophylaxis initiated/maintained Last Bowel Movement: 09/18/22  Diet Cardiac     Mattehw Score: 15  Fall Risk Score: 20  Accucheck []   Freq?      Lines/Drains/Airways       Peripherally Inserted Central Catheter Line  Duration             PICC Double Lumen 09/18/22 1738 right basilic <1 day              Drain  Duration                  Hemodialysis AV Fistula 09/14/22 0717 Left upper arm 5 days

## 2022-09-19 NOTE — PROGRESS NOTES
[facility] ([facility])  Orthopedics  Progress Note    Patient Name: Lavelle Ladd  MRN: [id number]  Admission Date: 9/13/2022  Hospital Length of Stay: 5 days  Attending Provider: Jean Blair, *  Primary Care Provider: Primary Doctor No  Follow-up For: Procedure(s) (LRB):  REMOVAL, EXTERNAL FIXATION DEVICE (Left)  INCISION AND DRAINAGE, LOWER EXTREMITY (Left)    Post-Operative Day: 2 Days Post-Op  Subjective:     Principal Problem:Acute osteomyelitis of left calcaneus    Principal Orthopedic Problem:  Osteomyelitis of left calcaneus    Interval History: Lavelle Ladd is 67-year-old male postop day 2 status post ex fix removal following osteomyelitis development of the calcaneus.  Patient is resting comfortably in bed.  He reports that he is feeling well.  No acute events overnight    Review of patient's allergies indicates:  No Known Allergies    Current Facility-Administered Medications   Medication    0.9%  NaCl infusion    acetaminophen tablet 650 mg    albuterol-ipratropium 2.5 mg-0.5 mg/3 mL nebulizer solution 3 mL    aluminum-magnesium hydroxide-simethicone 200-200-20 mg/5 mL suspension 30 mL    carvediloL tablet 12.5 mg    cefepime in dextrose 5 % IVPB 2 g    dextrose 10% bolus 125 mL    dextrose 10% bolus 250 mL    furosemide injection 40 mg    gabapentin capsule 100 mg    glucagon (human recombinant) injection 1 mg    glucose chewable tablet 16 g    glucose chewable tablet 24 g    heparin (porcine) injection 5,000 Units    HYDROcodone-acetaminophen  mg per tablet 1 tablet    insulin aspart U-100 pen 1-10 Units    insulin detemir U-100 pen 30 Units    linaCLOtide capsule 145 mcg    linezolid 600 mg/300 mL IVPB 600 mg    magnesium oxide tablet 400 mg    magnesium oxide tablet 800 mg    magnesium oxide tablet 800 mg    melatonin tablet 6 mg    morphine injection 2 mg    mycophenolate capsule 250 mg    naloxone 0.4 mg/mL injection 0.02 mg    ondansetron disintegrating tablet 4 mg     "ondansetron injection 4 mg    prochlorperazine injection Soln 5 mg    sertraline tablet 25 mg    simethicone chewable tablet 80 mg    sodium chloride 0.9% flush 10 mL    tacrolimus capsule 1 mg     Objective:     Vital Signs (Most Recent):  Temp: 97.4 °F (36.3 °C) (09/19/22 0425)  Pulse: 60 (09/19/22 0425)  Resp: 18 (09/19/22 0803)  BP: (!) 106/57 (09/19/22 0425)  SpO2: (!) 94 % (09/19/22 0803)   Vital Signs (24h Range):  Temp:  [97.4 °F (36.3 °C)-98.7 °F (37.1 °C)] 97.4 °F (36.3 °C)  Pulse:  [60-78] 60  Resp:  [17-20] 18  SpO2:  [93 %-95 %] 94 %  BP: ()/(53-64) 106/57     Weight: 97.5 kg (214 lb 15.2 oz)  Height: 5' 11" (180.3 cm)  Body mass index is 29.98 kg/m².      Intake/Output Summary (Last 24 hours) at 9/19/2022 0939  Last data filed at 9/19/2022 0721  Gross per 24 hour   Intake 240 ml   Output 1355 ml   Net -1115 ml       Ortho/SPM Exam  Left lower extremity:  Splint and dressing are clean, dry, and intact   Wound VAC is intact and suctioning appropriately     GEN: Well developed, well nourished male. AAOX3. No acute distress.   Head: Normocephalic, atraumatic.   Eyes: [place]  Neck: Trachea is midline, no adenopathy  Resp: Breathing unlabored.  Neuro: Motor function normal, Cranial nerves intact  Psych: Mood and affect appropriate.      Significant Labs: All pertinent labs within the past 24 hours have been reviewed.  None    Significant Imaging: None    Assessment/Plan:     Active Diagnoses:    Diagnosis Date Noted POA    PRINCIPAL PROBLEM:  Acute osteomyelitis of left calcaneus [M86.172] 09/13/2022 Yes    Infection of deep incisional surgical site after procedure [T81.42XA] 09/15/2022 Unknown    Hyponatremia [E87.1] 08/30/2022 Yes    Stage 3 chronic kidney disease [N18.30]  Yes    Long term current use of immunosuppressive drug [Z79.899] 07/06/2018 Not Applicable    Kidney transplant recipient [Z94.0] 04/07/2017 Not Applicable    H/O amputation [Z89.9] 12/01/2016 Yes    Chronic obstructive pulmonary " disease [J44.9] 09/12/2016 Yes    Coronary artery disease of native artery of native heart with stable angina pectoris [I25.118] 07/01/2016 Yes    Type 2 diabetes mellitus with hyperglycemia, with long-term current use of insulin [E11.65, Z79.4]  Not Applicable    Essential hypertension [I10] 02/20/2015 Yes      Problems Resolved During this Admission:     Assessment:  67-year-old male postop day 2 status post ex fix removal left lower extremity due to left calcaneal osteomyelitis    Plan:  NWB LLE  PT/OT for ADLs   DVT prophylaxis per primary team   Antibiotics per primary team, cultures and bone biopsy pending   Patient will most likely need CONOR Mcdermott PA-C  Orthopedics  [facility] Tele[facility] ([facility])

## 2022-09-19 NOTE — PT/OT/SLP EVAL
"Physical Therapy Evaluation and Discharge Note    Patient Name:  Lavelle Ladd   MRN:  [id number]    Recommendations:     Discharge Recommendations:  nursing facility, skilled, nursing facility, basic  Discharge Equipment Recommendations: none   Barriers to discharge: Decreased caregiver support    Assessment:     Lavelle Ladd is a 67 y.o. male admitted with a medical diagnosis of Acute osteomyelitis of left calcaneus. .  At this time, patient is functioning at their prior level of function and does not require further acute PT services.     Recent Surgery: Procedure(s) (LRB):  REMOVAL, EXTERNAL FIXATION DEVICE (Left)  INCISION AND DRAINAGE, LOWER EXTREMITY (Left) 2 Days Post-Op    Plan:     During this hospitalization, patient does not require further acute PT services.  Please re-consult if situation changes.      Subjective     Chief Complaint: PAIN   Patient/Family Comments/goals: NONE , "IM NOT GOING TO DO THERAPY OR THESE EXERCISES. " PT REPORTED NOT GETTING OUT OF BED SINCE HIS FIRST HOSPITALIZATION.     Pain/Comfort:  Pain Rating 1: 7/10  Location 1: back  Pain Rating Post-Intervention 1: 7/10  Pain Rating 2: 7/10  Location 2: leg  Pain Rating Post-Intervention 2: 7/10    Patients cultural, spiritual, Worship conflicts given the current situation:      Living Environment:  PT WAS LIVING AT HOME WITH GIRLFRIEND IN A ONE STORY HOME WITH RAMP TO ENTER   Prior to admission, patients level of function was MOD I WITH WC/ WC BOUND HOWEVER PAST MONTH TO 2 MONTHS BED BOUND .  Equipment used at home: wheelchair, bedside commode, grab bar.  DME owned (not currently used): none.  Upon discharge, patient will have assistance from UNKNOWN .    Objective:     Communicated with NURSE AND Epic CHART REVIEW  prior to session.  Patient found supine with peripheral IV, wound vac, telemetry, PICC line upon PT entry to room.    General Precautions: Standard, fall   Orthopedic Precautions:LLE non weight bearing   Braces: " "N/A   Respiratory Status: Room air    Exams:  Cognitive Exam:  Patient is oriented to Person, Place, and Time    Functional Mobility:  Bed Mobility:     Rolling Left:  supervision  Rolling Right: supervision  Supine to Sit: supervision  Sit to Supine: supervision  Balance: GOOD STATIC SITTING    AM-PAC 6 CLICK MOBILITY  Total Score:12       Therapeutic Activities and Exercises:   PT EDUCATED ON ROLE OF P.T. AND ON SAFE MOBILITY. PT SEATED EOB AND EDUCATED ON TE WHICH PT REPORTED, " I KNOW THOSE TE AND IM NOT GOING TO DO THEM" . PT CONT TO MOCK P.T. GESTURE "SHREYAS PRITCHETT WHILE P.T. WAS EDUCATION PT ON RISK FOR FALLS DUE TO GENERALIZED WEAKNESS, EDUCATED ON "CALL DON'T FALL", ENCOURAGED TO CALL FOR ASSISTANCE WITH ALL NEEDS SUCH AS BED<>CHAIR TRANSFERS OR TRIPS TO BATHROOM . PT CONT TO REFUSE    AM-PAC 6 CLICK MOBILITY  Total Score:12     Patient left HOB elevated with call button in reach and bed alarm on.    GOALS:   Multidisciplinary Problems       Physical Therapy Goals       Not on file                    History:     Past Medical History:   Diagnosis Date    DINORAH (acute kidney injury) 2016    Arthritis     CAD in native artery 2019    CHF (congestive heart failure)     Chronic obstructive pulmonary disease 2016    Coronary artery disease involving native coronary artery of native heart without angina pectoris 2016    CRI (chronic renal insufficiency) 2019     donor kidney transplant for DM 16     Induction with Thymo x3 and IV solumedrol to total 875mg  Kidney Biopsy  2016: 16 glomeruli, ACR type 1 AVR type 2, significant microcirculatory changes, c4d negative, No DSA, 5 to10% fibrosis. Treated with thymo x8 2016- no rejection      Diastolic heart failure     Encounter for blood transfusion     ESRD on RRT since 10/2013 10/29/2013    Biopsy proven diabetic nephropathy and lymphoplasmacytic interstitial infiltrate not c/w with AIN (ddx sjogrens or assoc with " tamm-horsefall protein extravasation)     GERD (gastroesophageal reflux disease)     History of hepatitis C, s/p successful Rx w/ SVR12 - 4/2017 4/5/2017    Completed 12 weeks harvoni w/ SVR    Hyperlipidemia     Hypertension     PAD (peripheral artery disease) 7/21/2019    PIC line (peripherally inserted central catheter) flush     Prophylactic immunotherapy     Proteinuria     PVD (peripheral vascular disease) 6/26/2017    RLE BKA CT 12/11/16 Extensive atherosclerotic disease of the aorta and mesenteric arteries.     Renal hypertension     Type 2 diabetes mellitus with diabetic neuropathy, with long-term current use of insulin 12/1/2016    Vitamin B12 deficiency        Past Surgical History:   Procedure Laterality Date    AORTOGRAPHY WITH SERIALOGRAPHY N/A 6/14/2018    Procedure: LEFT LEG ANGIOGRAM;  Surgeon: Donal Mcdonald MD;  Location: [facility] CATH LAB;  Service: Vascular;  Laterality: N/A;    APPLICATION OF LARGE EXTERNAL FIXATION DEVICE TO TIBIA Left 8/4/2022    Procedure: APPLICATION, EXTERNAL FIXATION DEVICE, LARGE, TIBIA;  Surgeon: Good Villa MD;  Location: [facility] OR;  Service: Orthopedics;  Laterality: Left;    av bovine graft      Left UE    AV FISTULA PLACEMENT      left UE    CARDIAC CATHETERIZATION  02/2015    CLOSED REDUCTION OF INJURY OF TIBIA Left 8/4/2022    Procedure: CLOSED REDUCTION, TIBIA;  Surgeon: Good Villa MD;  Location: [facility] OR;  Service: Orthopedics;  Laterality: Left;  Closed reduction left tibial and fibular shaft fractures    CLOSURE OF WOUND Left 9/24/2018    Procedure: CLOSURE, WOUND;  Surgeon: Karla Wheeler DPM;  Location: [facility] OR;  Service: Podiatry;  Laterality: Left;  Secondary Wound closure, extensive    CLOSURE OF WOUND Left 11/5/2018    Procedure: CLOSURE, WOUND;  Surgeon: Karla Wheeler DPM;  Location: [facility] OR;  Service: Podiatry;  Laterality: Left;    DEBRIDEMENT OF MULTIPLE METATARSAL BONES Left 11/5/2018    Procedure: DEBRIDEMENT, METATARSAL BONE, 2 OR  MORE BONES;  Surgeon: Karla Wheeler DPM;  Location: [facility] OR;  Service: Podiatry;  Laterality: Left;    EXCISION OF SKIN Left 9/27/2019    Procedure: EXCISION, SKIN;  Surgeon: Lenard Alarcon MD;  Location: [facility] OR 2ND FLR;  Service: Plastics;  Laterality: Left;  Plastics set, NIMS monitor, ACell    FOOT AMPUTATION THROUGH METATARSAL Left 9/21/2018    Procedure: AMPUTATION, FOOT, TRANSMETATARSAL;  Surgeon: Karla Wheeler DPM;  Location: [facility] OR;  Service: Podiatry;  Laterality: Left;    FOOT AMPUTATION THROUGH METATARSAL Left 10/31/2018    Procedure: AMPUTATION, FOOT, TRANSMETATARSAL;  Surgeon: Karla Wheeler DPM;  Location: [facility] OR;  Service: Podiatry;  Laterality: Left;    FOOT AMPUTATION THROUGH METATARSAL Left 11/5/2018    Procedure: AMPUTATION, FOOT, TRANSMETATARSAL;  Surgeon: Karla Wheeler DPM;  Location: [facility] OR;  Service: Podiatry;  Laterality: Left;  revisional transmetatarsal amputation, Left foot    IRRIGATION AND DEBRIDEMENT OF LOWER EXTREMITY Left 10/31/2018    Procedure: IRRIGATION AND DEBRIDEMENT, LOWER EXTREMITY;  Surgeon: Karla Wheeler DPM;  Location: [facility] OR;  Service: Podiatry;  Laterality: Left;    KIDNEY TRANSPLANT  05/21/2016    LEFT HEART CATHETERIZATION Left 7/21/2019    Procedure: CATHETERIZATION, HEART, LEFT;  Surgeon: Andrew Valdez MD;  Location: [facility] CATH LAB;  Service: Cardiology;  Laterality: Left;    LEG AMPUTATION THROUGH KNEE  2011    right LE, started as nail puncture leading to diabetic ulcer    SKIN FULL THICKNESS GRAFT Left 10/7/2019    Procedure: APPLICATION, GRAFT, SKIN, FULL-THICKNESS;  Surgeon: Lenard Alarcon MD;  Location: [facility] OR 2ND FLR;  Service: Plastics;  Laterality: Left;    SURGICAL REMOVAL OF LESION OF FACE Right 10/7/2019    Procedure: EXCISION, LESION, FACE;  Surgeon: Lenard Alarcon MD;  Location: [facility] OR 2ND FLR;  Service: Plastics;  Laterality: Right;       Time Tracking:     PT Received On: 09/19/22  PT Start Time: 0830     PT Stop  Time: 0855  PT Total Time (min): 25 min     Billable Minutes: Evaluation 15 and Therapeutic Activity 10      09/19/2022

## 2022-09-19 NOTE — ASSESSMENT & PLAN NOTE
9/14/22: c/o of left ankle pain-controlled. Pt was scheduled for surgery, however, surgery was post-poned 2/2 hyponatremia -Na 126. WBC normal, afebrile. Blood cultures show NGTD. .3. cont IV Abx  09/15/22- removal of the external fixator and debridement of osteomyelitis planned for today- procedure postponed due to hyponatremia- Lasix given - Nephrology following   9/16/22 - procedure postponed due to hyperglycemia  9/17/22 - post op day 1 - ABX in progress  9/19/22 - post op day 3 - Cefepime continued, Zyvox stopped. NWB. Wound care consulted for wound vac changes

## 2022-09-20 NOTE — NURSING
Pt's surgical drsg taken down for arterial study, instructed by Dr. Baldemar Mary (please have them just remove the ace bandages and cut down the front of the cast padding to clamshell it open so we can easily reapply afterwards), and notify once arterial study is complete and ortho would reapply drsg. Notified via SC at 1233 per MAYKEL Jay, study completed. At present drsg remains open, pt c/o discomfort and want Lle wrapped. Message sent via SC to   On call ortho Dr. Mckenzie, that His dressing/splint can be reinforced until the morning. LLE reinforced with rolled gauze and ace bandages. Wound vac remains intact to bilateral left heel @ 75. LLE elevated on pillows to promote comfort.

## 2022-09-20 NOTE — PROGRESS NOTES
Patient refused wound care. States I didn't knock loud enough and to get out, Will revisit tomorrow. Ortho aware.

## 2022-09-20 NOTE — ASSESSMENT & PLAN NOTE
Patient's FSGs are uncontrolled due to hyperglycemia on current medication regimen.  Last A1c reviewed-   Lab Results   Component Value Date    HGBA1C 7.4 (H) 08/04/2022     Most recent fingerstick glucose reviewed-   Recent Labs   Lab 09/19/22  1607 09/19/22 2015 09/20/22  0524 09/20/22  1114   POCTGLUCOSE 264* 239* 94 187*     Current correctional scale  Low  Maintain anti-hyperglycemic dose as follows-   Antihyperglycemics (From admission, onward)    Start     Stop Route Frequency Ordered    09/19/22 0900  insulin detemir U-100 pen 30 Units         -- SubQ 2 times daily 09/19/22 0821    09/16/22 1213  insulin aspart U-100 pen 1-10 Units         -- SubQ Before meals & nightly PRN 09/16/22 1113        Levemir added- changed to bid dosing and moderate sliding scale - dose increased from 16 units to 20 Units  Hold Oral hypoglycemics while patient is in the hospital.
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
## 2022-09-20 NOTE — CONSULTS
Thank you for your consult to [facility]. We have reviewed the patient chart. This patient does meet criteria for [facility] ER medicine service at this time. Will assume care on 09/20/22 at 7AM    Case discussed with Indira Perry NP as patient is refusing all care/therapy at this point. If patient is not amenable to virtual visit/examination tomorrow, will need to transfer back to in house service.

## 2022-09-20 NOTE — SUBJECTIVE & OBJECTIVE
Interval History: Hospital Course    Review of Systems   Constitutional:  Positive for activity change and fatigue. Negative for appetite change, chills, diaphoresis and fever.   HENT:  Negative for congestion, nosebleeds, sore throat and trouble swallowing.    Eyes:  Negative for pain, discharge and visual disturbance.   Respiratory:  Negative for apnea, cough, chest tightness, shortness of breath, wheezing and stridor.    Cardiovascular:  Negative for chest pain, palpitations and leg swelling.   Gastrointestinal:  Negative for abdominal distention, abdominal pain, blood in stool, constipation, diarrhea, nausea and vomiting.   Endocrine: Negative for cold intolerance and heat intolerance.   Genitourinary:  Negative for difficulty urinating, dysuria, flank pain, frequency and urgency.   Musculoskeletal:  Positive for arthralgias (left ankle pain), gait problem and joint swelling. Negative for back pain, myalgias, neck pain and neck stiffness.   Skin:  Positive for wound. Negative for rash.   Allergic/Immunologic: Negative for food allergies and immunocompromised state.   Neurological:  Negative for dizziness, seizures, syncope, facial asymmetry, weakness, light-headedness and headaches.   Hematological:  Negative for adenopathy.   Psychiatric/Behavioral:  Negative for agitation, behavioral problems and confusion. The patient is not nervous/anxious.    Objective:     Vital Signs (Most Recent):  Temp: 97.5 °F (36.4 °C) (09/20/22 1116)  Pulse: 68 (09/20/22 1319)  Resp: 20 (09/20/22 1206)  BP: (!) 122/58 (09/20/22 1116)  SpO2: 98 % (09/20/22 1116)   Vital Signs (24h Range):  Temp:  [97.5 °F (36.4 °C)-98.1 °F (36.7 °C)] 97.5 °F (36.4 °C)  Pulse:  [63-72] 68  Resp:  [14-20] 20  SpO2:  [93 %-98 %] 98 %  BP: ()/(51-70) 122/58     Weight: 97.2 kg (214 lb 4.6 oz)  Body mass index is 29.89 kg/m².    Intake/Output Summary (Last 24 hours) at 9/20/2022 1350  Last data filed at 9/20/2022 0600  Gross per 24 hour   Intake  1114.3 ml   Output 850 ml   Net 264.3 ml      Physical Exam  Vitals and nursing note reviewed.   Constitutional:       General: He is not in acute distress.     Appearance: He is well-developed. He is not diaphoretic.   HENT:      Head: Normocephalic and atraumatic.      Nose: Nose normal.   Eyes:      General: No scleral icterus.     Conjunctiva/sclera: Conjunctivae normal.   Cardiovascular:      Rate and Rhythm: Normal rate and regular rhythm.      Heart sounds: Normal heart sounds. No murmur heard.    No friction rub. No gallop.   Pulmonary:      Effort: Pulmonary effort is normal. No respiratory distress.      Breath sounds: Normal breath sounds. No stridor. No wheezing or rales.   Chest:      Chest wall: No tenderness.   Abdominal:      General: Bowel sounds are normal. There is no distension.      Palpations: Abdomen is soft.      Tenderness: There is no abdominal tenderness. There is no guarding or rebound.   Genitourinary:     Comments: Deferred   Musculoskeletal:         General: No tenderness or deformity. Normal range of motion.      Cervical back: Normal range of motion and neck supple.      Comments:   Right BKA      Right Lower Extremity: Right leg is amputated above knee.   Feet:      Comments: Wound to left calcaneous area - connected to wound vac. Draining serosanguinous, cloudy drainage. Post surgical dressing dry and intact.   Skin:     General: Skin is warm and dry.      Coloration: Skin is not pale.      Findings: No erythema or rash.   Neurological:      Mental Status: He is alert and oriented to person, place, and time.      Cranial Nerves: No cranial nerve deficit.      Motor: No abnormal muscle tone.      Coordination: Coordination normal.   Psychiatric:         Behavior: Behavior normal.         Thought Content: Thought content normal.       Significant Labs: All pertinent labs within the past 24 hours have been reviewed.  CBC:   Recent Labs   Lab 09/20/22  0433   WBC 6.36   HGB 9.3*   HCT  28.1*        CMP:   Recent Labs   Lab 09/20/22  0433   *   K 3.3*   CL 97   CO2 25      BUN 44*   CREATININE 2.1*   CALCIUM 8.3*   PROT 5.3*   ALBUMIN 1.9*   BILITOT 0.3   ALKPHOS 234*   AST 24   ALT 42   ANIONGAP 12       Significant Imaging: I have reviewed all pertinent imaging results/findings within the past 24 hours.

## 2022-09-20 NOTE — PLAN OF CARE
Problem: Adult Inpatient Plan of Care  Goal: Plan of Care Review  Outcome: Ongoing, Progressing  Goal: Patient-Specific Goal (Individualized)  Outcome: Ongoing, Progressing  Goal: Absence of Hospital-Acquired Illness or Injury  Outcome: Ongoing, Progressing  Goal: Optimal Comfort and Wellbeing  Outcome: Ongoing, Progressing  Goal: Readiness for Transition of Care  Outcome: Ongoing, Progressing     Problem: Infection  Goal: Absence of Infection Signs and Symptoms  Outcome: Ongoing, Progressing     Problem: Diabetes Comorbidity  Goal: Blood Glucose Level Within Targeted Range  Outcome: Ongoing, Progressing     Problem: Infection (Pneumonia)  Goal: Resolution of Infection Signs and Symptoms  Outcome: Ongoing, Progressing     Problem: Impaired Wound Healing  Goal: Optimal Wound Healing  Outcome: Ongoing, Progressing     Problem: Fall Injury Risk  Goal: Absence of Fall and Fall-Related Injury  Outcome: Ongoing, Progressing     Problem: Skin Injury Risk Increased  Goal: Skin Health and Integrity  Outcome: Ongoing, Progressing
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
## 2022-09-20 NOTE — ASSESSMENT & PLAN NOTE
-Orthopedic Surgery following   -IV antibiotics  - Cefepime  Proteus Mirabilis  -NWB LLE  DVT prophylaxis 24 hours after surgery   -Post op removal of the external fixator and debridement of osteomyelitis   -analgesia as needed

## 2022-09-20 NOTE — ASSESSMENT & PLAN NOTE
Hold cellcept due to active infection  Cont tacrolimus  Check tac level    Nephrology following  Slight bump in creatinine to 1.7>> 2.2  On Cellcept in progress  Nephrology stopped lasix today due to bump in creatinine. Gentle fluid bolus given

## 2022-09-20 NOTE — ASSESSMENT & PLAN NOTE
9/14/22: c/o of left ankle pain-controlled. Pt was scheduled for surgery, however, surgery was post-poned 2/2 hyponatremia -Na 126. WBC normal, afebrile. Blood cultures show NGTD. .3. cont IV Abx  09/15/22- removal of the external fixator and debridement of osteomyelitis planned for today- procedure postponed due to hyponatremia- Lasix given - Nephrology following   9/16/22 - procedure postponed due to hyperglycemia  9/17/22 - post op day 1 - ABX in progress  9/19/22 - post op day 3 - Cefepime continued, Zyvox stopped. NWB. Wound care consulted for wound vac changes  9/20/22 - bone cultures pending. Aerobic culture isolated proteus mirabils

## 2022-09-21 PROBLEM — I73.9 PERIPHERAL ARTERY DISEASE: Status: ACTIVE | Noted: 2022-01-01
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
## 2022-09-21 NOTE — PROGRESS NOTES
I reviewed the results of the patient's arterial studies which are consistent with severe peripheral vascular disease. From all prior notes, it appears he is not interested in amputation which was recommended by prior orthopedic surgeons coverign his care. I recommend evaluation with vascular surgery to consider revascularization options to salvage the leg. I am unsure if we have vascular coverage here. Otherwise, he needs to be set up with home health for wound checks and may follow-up with ortho next week for a recheck.

## 2022-09-21 NOTE — ASSESSMENT & PLAN NOTE
Arterial studies of left leg indicates significant stenosis  Vascular consult for possible angiogram
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
## 2022-09-21 NOTE — CONSULTS
[facility] - Telemetry ([facility])  Wound Care    Patient Name:  Lavelle Ladd   MRN:  [id number]  Date: 2022  Diagnosis: Acute osteomyelitis of left calcaneus    History:     Past Medical History:   Diagnosis Date    DINORAH (acute kidney injury) 2016    Arthritis     CAD in native artery 2019    CHF (congestive heart failure)     Chronic obstructive pulmonary disease 2016    Coronary artery disease involving native coronary artery of native heart without angina pectoris 2016    CRI (chronic renal insufficiency) 2019     donor kidney transplant for DM 16     Induction with Thymo x3 and IV solumedrol to total 875mg  Kidney Biopsy  2016: 16 glomeruli, ACR type 1 AVR type 2, significant microcirculatory changes, c4d negative, No DSA, 5 to10% fibrosis. Treated with thymo x8 2016- no rejection      Diastolic heart failure     Encounter for blood transfusion     ESRD on RRT since 10/2013 10/29/2013    Biopsy proven diabetic nephropathy and lymphoplasmacytic interstitial infiltrate not c/w with AIN (ddx sjogrens or assoc with tamm-horsefall protein extravasation)     GERD (gastroesophageal reflux disease)     History of hepatitis C, s/p successful Rx w/ SVR12 - 2017    Completed 12 weeks harvoni w/ SVR    Hyperlipidemia     Hypertension     PAD (peripheral artery disease) 2019    PIC line (peripherally inserted central catheter) flush     Prophylactic immunotherapy     Proteinuria     PVD (peripheral vascular disease) 2017    RLE BKA CT 16 Extensive atherosclerotic disease of the aorta and mesenteric arteries.     Renal hypertension     Type 2 diabetes mellitus with diabetic neuropathy, with long-term current use of insulin 2016    Vitamin B12 deficiency        Social History     Socioeconomic History    Marital status: Single   Occupational History    Occupation: Disabled     Employer: DISABLED   Tobacco Use    Smoking status: Former      Packs/day: 1.00     Years: 40.00     Pack years: 40.00     Types: Cigarettes     Quit date: 2013     Years since quittin.6    Smokeless tobacco: Never   Substance and Sexual Activity    Alcohol use: Yes     Alcohol/week: 6.0 standard drinks     Types: 6 Cans of beer per week     Comment: seldom    Drug use: No    Sexual activity: Never   Social History Narrative    Lives alone. Retired from construction and various jobs, now retired. Would like to return to work PT to alleviate boredom.       Precautions:     Allergies as of 2022    (No Known Allergies)       [facility] Assessment Details/Treatment           Consulted for wound vac change.  Wound vac currently in place with y connector to medial and lateral right foot. at -125mmHg low intensity continuous suction. Removed. lateral foot wound measures [id number]. Medial foot wound measures [id number]. Both with moist red granulating wound beds and antibiotic beads and suture noted in wound bed. Wounds do appear to connect as one side wouldn't hold suction while the other was open. Cleansed with saline and gauze and patted dry. Periwound painted with cavilon. Wounds each filled with 1 piece of black foam and secured with drape. Connected to I wound vac ulta with y connector at -125mmHg. Will plan for vac change Friday. Patient tolerated well.     2022
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
## 2022-09-21 NOTE — ASSESSMENT & PLAN NOTE
Patient's FSGs are uncontrolled due to hyperglycemia on current medication regimen.  Last A1c reviewed-   Lab Results   Component Value Date    HGBA1C 7.4 (H) 08/04/2022     Most recent fingerstick glucose reviewed-   Recent Labs   Lab 09/20/22  1539 09/20/22  2041 09/21/22  0449 09/21/22  1120   POCTGLUCOSE 204* 218* 188* 224*     Current correctional scale  Low  Maintain anti-hyperglycemic dose as follows-   Antihyperglycemics (From admission, onward)    Start     Stop Route Frequency Ordered    09/19/22 0900  insulin detemir U-100 pen 30 Units         -- SubQ 2 times daily 09/19/22 0821    09/16/22 1213  insulin aspart U-100 pen 1-10 Units         -- SubQ Before meals & nightly PRN 09/16/22 1113        Levemir added- changed to bid dosing and moderate sliding scale - dose increased from 16 units to 30 Units  Hold Oral hypoglycemics while patient is in the hospital.

## 2022-09-21 NOTE — SUBJECTIVE & OBJECTIVE
Past Medical History:   Diagnosis Date    DINORAH (acute kidney injury) 2016    Arthritis     CAD in native artery 2019    CHF (congestive heart failure)     Chronic obstructive pulmonary disease 2016    Coronary artery disease involving native coronary artery of native heart without angina pectoris 2016    CRI (chronic renal insufficiency) 2019     donor kidney transplant for DM 16     Induction with Thymo x3 and IV solumedrol to total 875mg  Kidney Biopsy  2016: 16 glomeruli, ACR type 1 AVR type 2, significant microcirculatory changes, c4d negative, No DSA, 5 to10% fibrosis. Treated with thymo x8 2016- no rejection      Diastolic heart failure     Encounter for blood transfusion     ESRD on RRT since 10/2013 10/29/2013    Biopsy proven diabetic nephropathy and lymphoplasmacytic interstitial infiltrate not c/w with AIN (ddx sjogrens or assoc with tamm-horsefall protein extravasation)     GERD (gastroesophageal reflux disease)     History of hepatitis C, s/p successful Rx w/ SVR12 - 2017    Completed 12 weeks harvoni w/ SVR    Hyperlipidemia     Hypertension     PAD (peripheral artery disease) 2019    PIC line (peripherally inserted central catheter) flush     Prophylactic immunotherapy     Proteinuria     PVD (peripheral vascular disease) 2017    RLE BKA CT 16 Extensive atherosclerotic disease of the aorta and mesenteric arteries.     Renal hypertension     Type 2 diabetes mellitus with diabetic neuropathy, with long-term current use of insulin 2016    Vitamin B12 deficiency        Past Surgical History:   Procedure Laterality Date    AORTOGRAPHY WITH SERIALOGRAPHY N/A 2018    Procedure: LEFT LEG ANGIOGRAM;  Surgeon: Donal Mcdonald MD;  Location: [facility] CATH LAB;  Service: Vascular;  Laterality: N/A;    APPLICATION OF LARGE EXTERNAL FIXATION DEVICE TO TIBIA Left 2022    Procedure: APPLICATION, EXTERNAL FIXATION DEVICE, LARGE, TIBIA;   Surgeon: Good Villa MD;  Location: [facility] OR;  Service: Orthopedics;  Laterality: Left;    av bovine graft      Left UE    AV FISTULA PLACEMENT      left UE    CARDIAC CATHETERIZATION  02/2015    CLOSED REDUCTION OF INJURY OF TIBIA Left 8/4/2022    Procedure: CLOSED REDUCTION, TIBIA;  Surgeon: Good Villa MD;  Location: [facility] OR;  Service: Orthopedics;  Laterality: Left;  Closed reduction left tibial and fibular shaft fractures    CLOSURE OF WOUND Left 9/24/2018    Procedure: CLOSURE, WOUND;  Surgeon: Karla Wheeler DPM;  Location: [facility] OR;  Service: Podiatry;  Laterality: Left;  Secondary Wound closure, extensive    CLOSURE OF WOUND Left 11/5/2018    Procedure: CLOSURE, WOUND;  Surgeon: Karla Wheeler DPM;  Location: [facility] OR;  Service: Podiatry;  Laterality: Left;    DEBRIDEMENT OF MULTIPLE METATARSAL BONES Left 11/5/2018    Procedure: DEBRIDEMENT, METATARSAL BONE, 2 OR MORE BONES;  Surgeon: Karla Wheeler DPM;  Location: [facility] OR;  Service: Podiatry;  Laterality: Left;    EXCISION OF SKIN Left 9/27/2019    Procedure: EXCISION, SKIN;  Surgeon: Lenard Alarcon MD;  Location: [address];  Service: Plastics;  Laterality: Left;  Plastics set, NIMS monitor, ACell    FOOT AMPUTATION THROUGH METATARSAL Left 9/21/2018    Procedure: AMPUTATION, FOOT, TRANSMETATARSAL;  Surgeon: Karla Wheeler DPM;  Location: [facility] OR;  Service: Podiatry;  Laterality: Left;    FOOT AMPUTATION THROUGH METATARSAL Left 10/31/2018    Procedure: AMPUTATION, FOOT, TRANSMETATARSAL;  Surgeon: Karla Wheeler DPM;  Location: [facility] OR;  Service: Podiatry;  Laterality: Left;    FOOT AMPUTATION THROUGH METATARSAL Left 11/5/2018    Procedure: AMPUTATION, FOOT, TRANSMETATARSAL;  Surgeon: Karla Wheeler DPM;  Location: [facility] OR;  Service: Podiatry;  Laterality: Left;  revisional transmetatarsal amputation, Left foot    IRRIGATION AND DEBRIDEMENT OF LOWER EXTREMITY Left 10/31/2018    Procedure: IRRIGATION AND  DEBRIDEMENT, LOWER EXTREMITY;  Surgeon: Karla Wheeler DPM;  Location: [facility] OR;  Service: Podiatry;  Laterality: Left;    KIDNEY TRANSPLANT  05/21/2016    LEFT HEART CATHETERIZATION Left 7/21/2019    Procedure: CATHETERIZATION, HEART, LEFT;  Surgeon: Andrew Valdez MD;  Location: [facility] CATH LAB;  Service: Cardiology;  Laterality: Left;    LEG AMPUTATION THROUGH KNEE  2011    right LE, started as nail puncture leading to diabetic ulcer    REMOVAL OF EXTERNAL FIXATION DEVICE Left 9/17/2022    Procedure: REMOVAL, EXTERNAL FIXATION DEVICE;  Surgeon: Kathleen Zazueta MD;  Location: [facility] OR;  Service: Orthopedics;  Laterality: Left;    SKIN FULL THICKNESS GRAFT Left 10/7/2019    Procedure: APPLICATION, GRAFT, SKIN, FULL-THICKNESS;  Surgeon: Lenard Alarcon MD;  Location: [facility] OR [address] [place] [address];  Service: Plastics;  Laterality: Left;    SURGICAL REMOVAL OF LESION OF FACE Right 10/7/2019    Procedure: EXCISION, LESION, FACE;  Surgeon: Lenard Alarcon MD;  [facility];  Service: Plastics;  Laterality: Right;       Review of patient's allergies indicates:  No Known Allergies    Medications:  Medications Prior to Admission   Medication Sig    carvediloL (COREG) 12.5 MG tablet Take 1 tablet (12.5 mg total) by mouth 2 (two) times daily.    acetaminophen (TYLENOL) 500 MG tablet Take 2 tablets (1,000 mg total) by mouth 3 (three) times daily.    amLODIPine (NORVASC) 10 MG tablet Take 10 mg by mouth once daily.    aspirin (ECOTRIN) 81 MG EC tablet Take 1 tablet (81 mg total) by mouth once daily.    atorvastatin (LIPITOR) 80 MG tablet Take 80 mg by mouth once daily.    ergocalciferol (ERGOCALCIFEROL) 50,000 unit Cap Take 1 capsule (50,000 Units total) by mouth every 7 days. Take on Mondays    gabapentin (NEURONTIN) 300 MG capsule Take 300 mg by mouth 3 (three) times daily.    linaCLOtide (LINZESS) 72 mcg Cap capsule Take 1 capsule (72 mcg total) by mouth before breakfast.    mycophenolate (CELLCEPT) 250 mg Cap Take 250 mg  by mouth 2 (two) times daily.    nitroGLYCERIN (NITROSTAT) 0.4 MG SL tablet Place 1 tablet (0.4 mg total) under the tongue every 5 (five) minutes as needed.    ondansetron (ZOFRAN) 4 MG tablet Take 4 mg by mouth every 8 (eight) hours as needed for Nausea.    oxyCODONE (ROXICODONE) 15 MG Tab Take 10 mg by mouth every 4 (four) hours as needed for Pain.    semaglutide (OZEMPIC) 1 mg/dose (2 mg/1.5 mL) PnIj Inject 1 mg under the skin every 7 days.    sertraline (ZOLOFT) 25 MG tablet Take 25 mg by mouth once daily.    tacrolimus (PROGRAF) 0.5 MG Cap Take 2 capsules (1 mg total) by mouth every 12 (twelve) hours.     Antibiotics (From admission, onward)      Start     Stop Route Frequency Ordered    09/21/22 2200  metroNIDAZOLE tablet 500 mg         -- Oral Every 8 hours 09/21/22 1528    09/21/22 1630  cefTRIAXone (ROCEPHIN) 2 g/50 mL D5W IVPB         -- IV Every 24 hours (non-standard times) 09/21/22 1528          Antifungals (From admission, onward)      None          Antivirals (From admission, onward)      None             Immunization History   Administered Date(s) Administered    Hepatitis A 03/18/2014    Hepatitis A / Hepatitis B 03/05/2013, 04/09/2013, 09/05/2013    Influenza (FLUAD) - Trivalent - Adjuvanted - PF (65+) 11/09/2019    Influenza - High Dose - PF (65 years and older) 08/23/2018, 09/29/2019, 10/05/2020    Influenza - Quadrivalent - PF *Preferred* (6 months and older) 09/26/2017    Pneumococcal Conjugate - 13 Valent 08/23/2018    Pneumococcal Polysaccharide - 23 Valent 08/20/2019       Family History       Problem Relation (Age of Onset)    Cancer Father    Diabetes Father    Heart failure Father, Mother    Stroke Father          Social History     Socioeconomic History    Marital status: Single   Occupational History    Occupation: Disabled     Employer: DISABLED   Tobacco Use    Smoking status: Former     Packs/day: 1.00     Years: 40.00     Pack years: 40.00     Types: Cigarettes     Quit date:  2013     Years since quittin.6    Smokeless tobacco: Never   Substance and Sexual Activity    Alcohol use: Yes     Alcohol/week: 6.0 standard drinks     Types: 6 Cans of beer per week     Comment: seldom    Drug use: No    Sexual activity: Never   Social History Narrative    Lives alone. Retired from construction and various jobs, now retired. Would like to return to work PT to alleviate boredom.     Review of Systems   Constitutional:  Positive for activity change and fatigue. Negative for appetite change, chills, diaphoresis and fever.   HENT:  Negative for congestion, nosebleeds, sore throat and trouble swallowing.    Eyes:  Negative for pain, discharge and visual disturbance.   Respiratory:  Negative for apnea, cough, chest tightness, shortness of breath, wheezing and stridor.    Cardiovascular:  Negative for chest pain, palpitations and leg swelling.   Gastrointestinal:  Negative for abdominal distention, abdominal pain, blood in stool, constipation, diarrhea, nausea and vomiting.   Endocrine: Negative for cold intolerance and heat intolerance.   Genitourinary:  Negative for difficulty urinating, dysuria, flank pain, frequency and urgency.   Musculoskeletal:  Positive for arthralgias (left ankle pain), gait problem and joint swelling. Negative for back pain, myalgias, neck pain and neck stiffness.   Skin:  Positive for wound. Negative for rash.   Allergic/Immunologic: Negative for food allergies and immunocompromised state.   Neurological:  Negative for dizziness, seizures, syncope, facial asymmetry, weakness, light-headedness and headaches.   Hematological:  Negative for adenopathy.   Psychiatric/Behavioral:  Negative for agitation, behavioral problems and confusion. The patient is not nervous/anxious.    Objective:     Vital Signs (Most Recent):  Temp: 97.5 °F (36.4 °C) (22 1238)  Pulse: 72 (22 1304)  Resp: 18 (22 1458)  BP: (!) 119/57 (22 1238)  SpO2: (!) 91 % (22  1238)   Vital Signs (24h Range):  Temp:  [97.5 °F (36.4 °C)-98.6 °F (37 °C)] 97.5 °F (36.4 °C)  Pulse:  [63-76] 72  Resp:  [16-20] 18  SpO2:  [91 %-97 %] 91 %  BP: (112-144)/(49-65) 119/57     Weight: 97.2 kg (214 lb 4.6 oz)  Body mass index is 29.89 kg/m².    Estimated Creatinine Clearance: 41.5 mL/min (A) (based on SCr of 2 mg/dL (H)).    Physical Exam  Constitutional:       Appearance: He is well-developed.   HENT:      Head: Normocephalic and atraumatic.      Nose: Nose normal.   Eyes:      Comments: PERRL   Cardiovascular:      Rate and Rhythm: Normal rate and regular rhythm.      Heart sounds: Normal heart sounds.   Pulmonary:      Effort: Pulmonary effort is normal. No respiratory distress.      Breath sounds: Normal breath sounds. No wheezing or rales.   Abdominal:      Tenderness: There is no abdominal tenderness.   Musculoskeletal:         General: Normal range of motion.      Cervical back: Normal range of motion and neck supple.      Comments: Dressing over right foot     Left BKA   Skin:     General: Skin is dry.   Neurological:      Mental Status: He is alert and oriented to person, place, and time.                 Significant Labs: Blood Culture:   Recent Labs   Lab 08/30/22  1520 08/30/22  1526 09/13/22  1434 09/13/22  1447   LABBLOO No growth after 5 days. No growth after 5 days. No growth after 5 days. No growth after 5 days.     BMP:   Recent Labs   Lab 09/21/22  0535   *      K 4.0      CO2 24   BUN 43*   CREATININE 2.0*   CALCIUM 8.6*     Wound Culture:   Recent Labs   Lab 09/17/22  1033   LABAERO PROTEUS MIRABILIS  Few  Skin skylar also present  *     All pertinent labs within the past 24 hours have been reviewed.    Significant Imaging: I have reviewed all pertinent imaging results/findings within the past 24 hours.
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
## 2022-09-21 NOTE — SUBJECTIVE & OBJECTIVE
Interval History: Pt is disoriented to time. Re oriented. He says he was at [facility] prior to arrival. Reports some moderate pain to the left foot. Discussed plan of care. Asking if Vascular can save his foot.     Review of Systems   Constitutional:  Positive for activity change and fatigue. Negative for appetite change, chills, diaphoresis and fever.   HENT:  Negative for congestion, nosebleeds, sore throat and trouble swallowing.    Eyes:  Negative for pain, discharge and visual disturbance.   Respiratory:  Negative for apnea, cough, chest tightness, shortness of breath, wheezing and stridor.    Cardiovascular:  Negative for chest pain, palpitations and leg swelling.   Gastrointestinal:  Negative for abdominal distention, abdominal pain, blood in stool, constipation, diarrhea, nausea and vomiting.   Endocrine: Negative for cold intolerance and heat intolerance.   Genitourinary:  Negative for difficulty urinating, dysuria, flank pain, frequency and urgency.   Musculoskeletal:  Positive for arthralgias (left ankle pain), gait problem and joint swelling. Negative for back pain, myalgias, neck pain and neck stiffness.   Skin:  Positive for wound. Negative for rash.   Allergic/Immunologic: Negative for food allergies and immunocompromised state.   Neurological:  Negative for dizziness, seizures, syncope, facial asymmetry, weakness, light-headedness and headaches.   Hematological:  Negative for adenopathy.   Psychiatric/Behavioral:  Negative for agitation, behavioral problems and confusion. The patient is not nervous/anxious.    Objective:     Vital Signs (Most Recent):  Temp: 98.6 °F (37 °C) (09/21/22 1122)  Pulse: 65 (09/21/22 1122)  Resp: 17 (09/21/22 1122)  BP: 112/62 (09/21/22 1122)  SpO2: 95 % (09/21/22 1122) Vital Signs (24h Range):  Temp:  [97.5 °F (36.4 °C)-98.6 °F (37 °C)] 98.6 °F (37 °C)  Pulse:  [63-76] 65  Resp:  [16-20] 17  SpO2:  [92 %-97 %] 95 %  BP: (112-144)/(49-65) 112/62     Weight: 97.2 kg (214 lb  4.6 oz)  Body mass index is 29.89 kg/m².    Intake/Output Summary (Last 24 hours) at 9/21/2022 1202  Last data filed at 9/21/2022 1000  Gross per 24 hour   Intake 1157.17 ml   Output 700 ml   Net 457.17 ml      Physical Exam  Vitals and nursing note reviewed.   Constitutional:       General: He is not in acute distress.     Appearance: He is well-developed. He is not diaphoretic.   HENT:      Head: Normocephalic and atraumatic.      Nose: Nose normal.   Eyes:      General: No scleral icterus.     Conjunctiva/sclera: Conjunctivae normal.   Cardiovascular:      Rate and Rhythm: Normal rate and regular rhythm.      Heart sounds: Normal heart sounds. No murmur heard.    No friction rub. No gallop.   Pulmonary:      Effort: Pulmonary effort is normal. No respiratory distress.      Breath sounds: Normal breath sounds. No stridor. No wheezing or rales.   Chest:      Chest wall: No tenderness.   Abdominal:      General: Bowel sounds are normal. There is no distension.      Palpations: Abdomen is soft.      Tenderness: There is no abdominal tenderness. There is no guarding or rebound.   Genitourinary:     Comments: Deferred   Musculoskeletal:         General: No tenderness or deformity. Normal range of motion.      Cervical back: Normal range of motion and neck supple.      Comments:   Right BKA  Wound vac intact to left foot. ACE wrap to left lower leg      Right Lower Extremity: Right leg is amputated above knee.   Feet:      Comments: Wound to left calcaneous area - connected to wound vac. Draining serosanguinous, cloudy drainage. Post surgical dressing dry and intact.   Skin:     General: Skin is warm and dry.      Coloration: Skin is not pale.      Findings: No erythema or rash.   Neurological:      Mental Status: He is alert and oriented to person, place, and time.      Cranial Nerves: No cranial nerve deficit.      Motor: No abnormal muscle tone.      Coordination: Coordination normal.   Psychiatric:         Behavior:  Behavior normal.         Thought Content: Thought content normal.       Significant Labs: All pertinent labs within the past 24 hours have been reviewed.  CBC:   Recent Labs   Lab 09/20/22  0433 09/21/22  0535   WBC 6.36 8.73   HGB 9.3* 9.7*   HCT 28.1* 29.1*    223     CMP:   Recent Labs   Lab 09/20/22  0433 09/21/22  0535   * 136   K 3.3* 4.0   CL 97 100   CO2 25 24    198*   BUN 44* 43*   CREATININE 2.1* 2.0*   CALCIUM 8.3* 8.6*   PROT 5.3* 5.5*   ALBUMIN 1.9* 1.9*   BILITOT 0.3 0.3   ALKPHOS 234* 230*   AST 24 21   ALT 42 32   ANIONGAP 12 12       Significant Imaging: I have reviewed all pertinent imaging results/findings within the past 24 hours.

## 2022-09-21 NOTE — PLAN OF CARE
Problem: Adult Inpatient Plan of Care  Goal: Plan of Care Review  Outcome: Ongoing, Progressing     Problem: Adult Inpatient Plan of Care  Goal: Absence of Hospital-Acquired Illness or Injury  Outcome: Ongoing, Progressing     Problem: Adult Inpatient Plan of Care  Goal: Optimal Comfort and Wellbeing  Outcome: Ongoing, Progressing     Problem: Infection  Goal: Absence of Infection Signs and Symptoms  Outcome: Ongoing, Progressing     Problem: Diabetes Comorbidity  Goal: Blood Glucose Level Within Targeted Range  Outcome: Ongoing, Progressing     Problem: Infection (Pneumonia)  Goal: Resolution of Infection Signs and Symptoms  Outcome: Ongoing, Progressing     Problem: Impaired Wound Healing  Goal: Optimal Wound Healing  Outcome: Ongoing, Progressing     Problem: Fall Injury Risk  Goal: Absence of Fall and Fall-Related Injury  Outcome: Ongoing, Progressing     Problem: Skin Injury Risk Increased  Goal: Skin Health and Integrity  Outcome: Ongoing, Progressing      Patient remains free from injury. Safety precautions maintained. No s/s of acute distress. Pain controlled per MD order. IVF infusing. Cardiac monitoring in place. Blood glucose monitoring continued. Wound vac care completed.  Pt education about care completed.     Pt refused midnight vital signs.

## 2022-09-21 NOTE — ASSESSMENT & PLAN NOTE
Will use cultures to guide regime - cultures- proteus/bacteoides -will use 6 weeks of Rocephin /Flagyl

## 2022-09-21 NOTE — ASSESSMENT & PLAN NOTE
9/14/22:Surgery was post-poned 2/2 hyponatremia -Na 126. Corrected Na to glucose is 129. . CXR- some cephalization. Abd u/s showed- cirrhosis changes to liver. Hyponatremia likely 2/2 hypervolemia and Cirrhosis. Will add IV lasix. Add Levemir for hyperglycemia. Tacrolimus level 10- will consult nephrology for renal transplant and hyponatremia   -9/15/22- Na corrected to 132- BNP elevated - Lasix given   9/17/22 - Na corrected 133  9/21/22 - Na 136 - normal
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
## 2022-09-21 NOTE — ASSESSMENT & PLAN NOTE
9/14/22: c/o of left ankle pain-controlled. Pt was scheduled for surgery, however, surgery was post-poned 2/2 hyponatremia -Na 126. WBC normal, afebrile. Blood cultures show NGTD. .3. cont IV Abx  09/15/22- removal of the external fixator and debridement of osteomyelitis planned for today- procedure postponed due to hyponatremia- Lasix given - Nephrology following   9/16/22 - procedure postponed due to hyperglycemia  9/17/22 - post op day 1 - ABX in progress  9/19/22 - post op day 3 - Cefepime continued, Zyvox stopped. NWB. Wound care consulted for wound vac changes  9/20/22 - bone cultures pending. Aerobic culture isolated proteus mirabils  9/21/22 - Proteus mirabilis and B. faecis - Unasyn

## 2022-09-21 NOTE — ASSESSMENT & PLAN NOTE
-Orthopedic Surgery following   -IV antibiotics  - Unasyn  Proteus Mirabilis and B. faecis   -NWB LLE  DVT prophylaxis 24 hours after surgery   -Post op removal of the external fixator and debridement of osteomyelitis   -analgesia as needed   Per Ortho -  He just needs to be set up with home health for wound checks and may follow-up with ortho next week for a recheck.
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
## 2022-09-21 NOTE — ASSESSMENT & PLAN NOTE
Hold cellcept due to active infection  Cont tacrolimus  Check tac level    Nephrology following  Slight bump in creatinine to 1.7>> 2.2>>> 2.0  On Cellcept in progress  Nephrology stopped lasix today due to bump in creatinine. Gentle fluid bolus given

## 2022-09-22 PROBLEM — E87.1 HYPONATREMIA: Status: RESOLVED | Noted: 2022-01-01 | Resolved: 2022-01-01
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
## 2022-09-22 NOTE — ASSESSMENT & PLAN NOTE
Arterial studies of left leg indicates significant stenosis  Vascular consult for possible angiogram >>> 9/23/22  NPO after MN

## 2022-09-22 NOTE — ASSESSMENT & PLAN NOTE
Patient's FSGs are uncontrolled due to hyperglycemia on current medication regimen.  Last A1c reviewed-   Lab Results   Component Value Date    HGBA1C 7.4 (H) 08/04/2022     Most recent fingerstick glucose reviewed-   Recent Labs   Lab 09/21/22  1932 09/22/22  0434 09/22/22  1109   POCTGLUCOSE 196* 68* 97     Current correctional scale  Low  Maintain anti-hyperglycemic dose as follows-   Antihyperglycemics (From admission, onward)    Start     Stop Route Frequency Ordered    09/19/22 0900  insulin detemir U-100 pen 30 Units         -- SubQ 2 times daily 09/19/22 0821    09/16/22 1213  insulin aspart U-100 pen 1-10 Units         -- SubQ Before meals & nightly PRN 09/16/22 1113        Levemir added- changed to bid dosing and moderate sliding scale - dose increased from 16 units to 30 Units  Hold Oral hypoglycemics while patient is in the hospital.

## 2022-09-22 NOTE — PT/OT/SLP PROGRESS
"Occupational Therapy      Patient Name:  Lavelle Ladd   MRN:  [id number]    OT re-eval initiated via epic chart review. Patient not seen today at 2:12 PM secondary to pt refusing OT at this time due to back and stomach pain. Pt stating "I promise I'll try tomorrow." Will follow-up on a later day.    9/22/2022  Yuli Solomon, OT   0212 PM  "

## 2022-09-22 NOTE — SUBJECTIVE & OBJECTIVE
Interval History:  See Hospital Course    Review of Systems   Constitutional:  Positive for activity change and fatigue. Negative for appetite change, chills, diaphoresis and fever.   HENT:  Negative for congestion, nosebleeds, sore throat and trouble swallowing.    Eyes:  Negative for pain, discharge and visual disturbance.   Respiratory:  Negative for apnea, cough, chest tightness, shortness of breath, wheezing and stridor.    Cardiovascular:  Negative for chest pain, palpitations and leg swelling.   Gastrointestinal:  Positive for abdominal distention, abdominal pain, nausea and vomiting. Negative for blood in stool, constipation and diarrhea.   Endocrine: Negative for cold intolerance and heat intolerance.   Genitourinary:  Negative for difficulty urinating, dysuria, flank pain, frequency and urgency.   Musculoskeletal:  Positive for arthralgias (left ankle pain), gait problem and joint swelling. Negative for back pain, myalgias, neck pain and neck stiffness.   Skin:  Positive for wound. Negative for rash.   Allergic/Immunologic: Negative for food allergies and immunocompromised state.   Neurological:  Negative for dizziness, seizures, syncope, facial asymmetry, weakness, light-headedness and headaches.   Hematological:  Negative for adenopathy.   Psychiatric/Behavioral:  Negative for agitation, behavioral problems and confusion. The patient is not nervous/anxious.    Objective:     Vital Signs (Most Recent):  Temp: 97.8 °F (36.6 °C) (09/22/22 0746)  Pulse: 70 (09/22/22 0800)  Resp: 20 (09/22/22 1000)  BP: 138/63 (09/22/22 0746)  SpO2: 96 % (09/22/22 0800) Vital Signs (24h Range):  Temp:  [97.7 °F (36.5 °C)-98.1 °F (36.7 °C)] 97.8 °F (36.6 °C)  Pulse:  [67-76] 70  Resp:  [16-20] 20  SpO2:  [93 %-97 %] 96 %  BP: (109-157)/(55-89) 138/63     Weight: 98.3 kg (216 lb 11.4 oz)  Body mass index is 30.23 kg/m².    Intake/Output Summary (Last 24 hours) at 9/22/2022 1409  Last data filed at 9/22/2022 1200  Gross per 24  hour   Intake 1200 ml   Output 200 ml   Net 1000 ml      Physical Exam  Vitals and nursing note reviewed.   Constitutional:       General: He is in acute distress (due to abdominal pain).      Appearance: He is well-developed. He is ill-appearing. He is not diaphoretic.   HENT:      Head: Normocephalic and atraumatic.      Nose: Nose normal.   Eyes:      General: No scleral icterus.     Conjunctiva/sclera: Conjunctivae normal.   Cardiovascular:      Rate and Rhythm: Normal rate and regular rhythm.      Heart sounds: Normal heart sounds. No murmur heard.    No friction rub. No gallop.   Pulmonary:      Effort: Pulmonary effort is normal. No respiratory distress.      Breath sounds: Normal breath sounds. No stridor. No wheezing or rales.   Chest:      Chest wall: No tenderness.   Abdominal:      General: Bowel sounds are normal. There is distension.      Palpations: Abdomen is soft.      Tenderness: There is abdominal tenderness in the left upper quadrant and left lower quadrant. There is no guarding or rebound.   Genitourinary:     Comments: Deferred   Musculoskeletal:         General: No tenderness or deformity. Normal range of motion.      Cervical back: Normal range of motion and neck supple.      Comments:   Right BKA  Wound vac intact to left foot. ACE wrap to left lower leg      Right Lower Extremity: Right leg is amputated above knee.   Feet:      Comments: Wound to left calcaneous area - connected to wound vac. Draining serosanguinous, cloudy drainage. Post surgical dressing dry and intact.   Skin:     General: Skin is warm and dry.      Coloration: Skin is not pale.      Findings: No erythema or rash.   Neurological:      Mental Status: He is alert and oriented to person, place, and time.      Cranial Nerves: No cranial nerve deficit.      Motor: No abnormal muscle tone.      Coordination: Coordination normal.   Psychiatric:         Behavior: Behavior normal.         Thought Content: Thought content normal.        Significant Labs: All pertinent labs within the past 24 hours have been reviewed.  CBC:   Recent Labs   Lab 09/21/22  0535 09/22/22  0450   WBC 8.73 12.07   HGB 9.7* 10.0*   HCT 29.1* 30.8*    247     CMP:   Recent Labs   Lab 09/21/22  0535 09/22/22  0450    136   K 4.0 3.6    103   CO2 24 23   * 63*   BUN 43* 38*   CREATININE 2.0* 1.6*   CALCIUM 8.6* 9.4   PROT 5.5*  --    ALBUMIN 1.9*  --    BILITOT 0.3  --    ALKPHOS 230*  --    AST 21  --    ALT 32  --    ANIONGAP 12 10       Significant Imaging: I have reviewed all pertinent imaging results/findings within the past 24 hours.

## 2022-09-22 NOTE — NURSING
PATIENT HAS AGAIN REMOVED CARDIAC MONITORING AND IS REFUSING TO ALLOW STAFF TO REPLACE LEADS. NP NOTIFIED.

## 2022-09-22 NOTE — ASSESSMENT & PLAN NOTE
9/14/22: c/o of left ankle pain-controlled. Pt was scheduled for surgery, however, surgery was post-poned 2/2 hyponatremia -Na 126. WBC normal, afebrile. Blood cultures show NGTD. .3. cont IV Abx  09/15/22- removal of the external fixator and debridement of osteomyelitis planned for today- procedure postponed due to hyponatremia- Lasix given - Nephrology following   9/16/22 - procedure postponed due to hyperglycemia  9/17/22 - post op day 1 - ABX in progress  9/19/22 - post op day 3 - Cefepime continued, Zyvox stopped. NWB. Wound care consulted for wound vac changes  9/20/22 - bone cultures pending. Aerobic culture isolated proteus mirabils  9/21/22 - Proteus mirabilis and B. faecis - Unasyn  9/22/22 - 6 weeks of Rocephin and Flagyl per Dr. Veliz

## 2022-09-22 NOTE — ASSESSMENT & PLAN NOTE
-Orthopedic Surgery following   -IV antibiotics  - Rocephin and Flagyl x 6 weeks  Proteus Mirabilis and B. faecis   -NWB LLE  DVT prophylaxis 24 hours after surgery   -Post op removal of the external fixator and debridement of osteomyelitis   -analgesia as needed   Per Ortho -  He just needs to be set up with home health for wound checks and may follow-up with ortho next week for a recheck ( pt to return to [facility])
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
## 2022-09-22 NOTE — ASSESSMENT & PLAN NOTE
Appears stable   -Cr 1.8> 1.6> 1.7  Avoid nephrotoxic medications   Monitor   -Nephrology following   Stable and Nephrology signed off on 9/22/22  Ensure IV hydration pre/post angiogram on 9/23/22

## 2022-09-22 NOTE — PROGRESS NOTES
Patient asleep during my time of visit - telemetry monitor sitting on the table. Left lower extremity wrapped with ACE wrap - wound vac to the left heel. Pt will be kept NPO after midnight.  He is scheduled for a LLE angiogram tomorrow afternoon with Dr. Mcdonald.    Jacqueline Jorge

## 2022-09-22 NOTE — ASSESSMENT & PLAN NOTE
Hold cellcept due to active infection  Cont tacrolimus  Check tac level    Nephrology following  Slight bump in creatinine to 1.7>> 2.2>>> 2.0>>> 1.6  On Cellcept in progress  Nephrology stopped lasix today due to bump in creatinine. Gentle fluid bolus given

## 2022-09-23 NOTE — ASSESSMENT & PLAN NOTE
Patient's FSGs are uncontrolled due to hyperglycemia on current medication regimen.  Last A1c reviewed-   Lab Results   Component Value Date    HGBA1C 7.4 (H) 08/04/2022     Most recent fingerstick glucose reviewed-   Recent Labs   Lab 09/22/22  1109 09/22/22  1532   POCTGLUCOSE 97 126*     Current correctional scale  Low  Maintain anti-hyperglycemic dose as follows-   Antihyperglycemics (From admission, onward)    Start     Stop Route Frequency Ordered    09/19/22 0900  insulin detemir U-100 pen 30 Units         -- SubQ 2 times daily 09/19/22 0821    09/16/22 1213  insulin aspart U-100 pen 1-10 Units         -- SubQ Before meals & nightly PRN 09/16/22 1113        Levemir added- changed to bid dosing and moderate sliding scale - dose increased from 16 units to 30 Units  Hold Oral hypoglycemics while patient is in the hospital.

## 2022-09-23 NOTE — PROGRESS NOTES
Vasc/VSC    Pt brought down to cath lab for angiogram  Combative, complaining of abdominal pain, refusing procedure    Procedure cancelled

## 2022-09-23 NOTE — ASSESSMENT & PLAN NOTE
-Orthopedic Surgery following   -IV antibiotics  - Rocephin and Flagyl x 6 weeks  Proteus Mirabilis and B. faecis   -NWB LLE  DVT prophylaxis 24 hours after surgery   -Post op removal of the external fixator and debridement of osteomyelitis   -analgesia as needed   Per Ortho -  He just needs to be set up with home health for wound checks and may follow-up with ortho next week for a recheck ( pt to return to [facility])

## 2022-09-23 NOTE — PT/OT/SLP PROGRESS
"Occupational Therapy      Patient Name:  Lavelle Ladd   MRN:  [id number]    OT re-eval initiated via epic chart review. Patient not seen today at 8:40 AM secondary to pt refusing and wanting to rest at this time, stating "I'm about to go into surgery in about 5 minutes." Possible angiogram today. Will follow-up next visit.    9/23/2022  Yuli Solomon, OT   [id number]  "
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
## 2022-09-23 NOTE — SUBJECTIVE & OBJECTIVE
Interval History: See hospital course for today      Review of Systems   Constitutional:  Positive for activity change and fatigue. Negative for fever.   Gastrointestinal:  Positive for abdominal pain and nausea. Negative for constipation and vomiting.   Musculoskeletal:  Positive for gait problem.   Skin:  Positive for wound.   Neurological:  Positive for weakness.   Psychiatric/Behavioral:  Negative for sleep disturbance.    Objective:     Vital Signs (Most Recent):  Temp: 98.3 °F (36.8 °C) (09/23/22 0729)  Pulse: 64 (09/23/22 0729)  Resp: 18 (09/23/22 0836)  BP: (!) 124/58 (09/23/22 0729)  SpO2: 96 % (09/23/22 0836)   Vital Signs (24h Range):  Temp:  [97.5 °F (36.4 °C)-98.3 °F (36.8 °C)] 98.3 °F (36.8 °C)  Pulse:  [64-81] 64  Resp:  [16-20] 18  SpO2:  [94 %-97 %] 96 %  BP: (124-189)/(58-84) 124/58     Weight: 98.3 kg (216 lb 11.4 oz)  Body mass index is 30.23 kg/m².    Intake/Output Summary (Last 24 hours) at 9/23/2022 1029  Last data filed at 9/23/2022 0600  Gross per 24 hour   Intake 720 ml   Output 2300 ml   Net -1580 ml      Physical Exam  Vitals and nursing note reviewed.   Constitutional:       General: He is not in acute distress.     Appearance: He is ill-appearing. He is not toxic-appearing.   HENT:      Head: Normocephalic and atraumatic.   Cardiovascular:      Rate and Rhythm: Normal rate.   Pulmonary:      Effort: Pulmonary effort is normal. No respiratory distress.   Abdominal:      Palpations: Abdomen is soft.      Tenderness: There is abdominal tenderness.   Musculoskeletal:         General: Deformity present.      Left foot: Deformity present.      Comments: Left tma, wound vac in place      Left Lower Extremity: Left leg is amputated below knee.   Skin:     General: Skin is warm.      Findings: Lesion present.   Neurological:      Mental Status: He is alert.      Motor: Weakness present.       Significant Labs: All pertinent labs within the past 24 hours have been reviewed.    CBC:   Recent Labs    Lab 09/22/22  0450 09/23/22  0559   WBC 12.07 12.73*   HGB 10.0* 11.0*   HCT 30.8* 33.5*    209     CMP:   Recent Labs   Lab 09/22/22  0450 09/23/22  0559    139   K 3.6 3.5    100   CO2 23 27   GLU 63* 74   BUN 38* 24*   CREATININE 1.6* 1.1   CALCIUM 9.4 10.1   ANIONGAP 10 12       Significant Imaging: I have reviewed all pertinent imaging results/findings within the past 24 hours.

## 2022-09-23 NOTE — CONSULTS
"  [facility] - Telemetry ([facility])  Adult Nutrition  Consult Note    SUMMARY     Recommendations  1. Recommend pt be advanced to a cardiac,renal, diabetic diet when medically appropriate.   2. Recommend pt recieves Suplena with carb steady TID on tray with meals.   3. Recommend pt recieves arginaid BID to assist with wound healing.    Goals: 1. Pt diet order will be advanced with 24-48 hours. 2. Pt will consume >75% of energy needs by RD follow up.  Nutrition Goal Status: new  Communication of RD Recs: other (comment) (Plan of care: Sticky note)    Assessment and Plan  Nutrition Problem  Inadequate energy intake    Related to (etiology):   Decreased ability to consume sufficient energy needs.    Signs and Symptoms (as evidenced by):   NPO, Wound    Interventions/Recommendations (treatment strategy):  1. Recommend pt be advanced to a cardiac,renal, diabetic diet when medically appropriate.   2. Recommend pt recieves Suplena with carb steady TID on tray with meals.   3. Recommend pt recieves arginaid BID on tray with meals to assist with wound healing.    Nutrition Diagnosis Status:   New       Reason for Assessment    Reason For Assessment: consult, RD follow-up, length of stay  Diagnosis: infection/sepsis  Relevant Medical History: HTN, T2DM, CAD, COPD, H/O amputation, CKD3  Interdisciplinary Rounds: did not attend  General Information Comments:   9/23/22: RD assessed pt for consult of needing oral supplument, LOS and RD follow up. Pt states that he does not feel well and this has cause him not to have a appetite. when the pt was asked how much food he was consuming he replied "I dont know" the RD estimates this amount to be 0-25%. The pt states that his appetite has been like this for the last three though four days. The pt says that he has experiencing v/n but would not specifi how frequently or how often. The pt complains that his wound is bothering him. Based on his PMH, I believe Suplena with carb steady is a good " "supplument for this pt.  Nutrition Discharge Planning: cardiac, renal diet    Nutrition Risk Screen    Nutrition Risk Screen: large or nonhealing wound, burn or pressure injury    Nutrition/Diet History    Spiritual, Cultural Beliefs, Church Practices, Values that Affect Care: no  Food Allergies: NKFA  Factors Affecting Nutritional Intake: decreased appetite    Anthropometrics    Temp: 98.3 °F (36.8 °C)  Height Method: Stated  Height: 5' 11" (180.3 cm)  Height (inches): 71 in  Weight Method: Bed Scale  Weight: 98.3 kg (216 lb 11.4 oz)  Weight (lb): 216.71 lb  Ideal Body Weight (IBW), Male: 172 lb  % Ideal Body Weight, Male (lb): 125.99 %  BMI (Calculated): 30.2  Amputation %: 5.9 (Right BKA)  Total Amputation %: 5.9  Amputation Ideal Body Weight (IBW), Male (lb): 166.1 lb  Amputee BMI (kg/m2): 32.21 kg/m2  Additional Documentation: Amputations Present (Ideal Body Weight)    Lab/Procedures/Meds  BMP  Lab Results   Component Value Date     09/23/2022    K 3.5 09/23/2022     09/23/2022    CO2 27 09/23/2022    BUN 24 (H) 09/23/2022    CREATININE 1.1 09/23/2022    CALCIUM 10.1 09/23/2022    ANIONGAP 12 09/23/2022    ESTGFRAFRICA 47 (A) 08/15/2021    EGFRNONAA 41 (A) 08/15/2021       Pertinent Labs Reviewed: reviewed  Pertinent Labs Comments: Na 128, Cr 1.6, GFR 46, Glu 266, Mg 1.4, Alb 1.8, , ALT 94, A1c 7.4% on 8/4/22  Pertinent Medications Reviewed: reviewed  Pertinent Medications Comments: atorvastatin, carvedilol, furosemide, gabapentin, insulin, magnesium oxide, tacrolimus  Scheduled Meds:   carvediloL  12.5 mg Oral BID    cefTRIAXone (ROCEPHIN) IVPB  2 g Intravenous Q24H    epoetin dyana-epbx  50 Units/kg Subcutaneous Every Mon, Wed, Fri    gabapentin  100 mg Oral TID    heparin (porcine)  5,000 Units Subcutaneous Q8H    insulin detemir U-100  30 Units Subcutaneous BID    linaCLOtide  145 mcg Oral Before breakfast    magnesium oxide  400 mg Oral BID    metroNIDAZOLE  500 mg Oral Q8H    " ondansetron  4 mg Intravenous Q8H    sertraline  25 mg Oral Daily    tacrolimus  1 mg Oral BID     Continuous Infusions:   sodium chloride 0.9% 100 mL/hr at 09/22/22 1431     PRN Meds:.sodium chloride 0.9%, acetaminophen, albuterol-ipratropium, aluminum-magnesium hydroxide-simethicone, dextrose 10%, dextrose 10%, glucagon (human recombinant), glucose, glucose, HYDROcodone-acetaminophen, insulin aspart U-100, magnesium oxide, magnesium oxide, melatonin, morphine, naloxone, ondansetron, prochlorperazine, simethicone, sodium chloride 0.9%    Estimated/Assessed Needs    Weight Used For Calorie Calculations: 75.5 kg (166 lb 7.2 oz)  Energy Calorie Requirements (kcal): 1850 (MSJ, AF 1.2)  Energy Need Method: [place]  Protein Requirements: 75-94  Weight Used For Protein Calculations: 75.5 kg (166 lb 7.2 oz)  Fluid Requirements (mL): 1850  Estimated Fluid Requirement Method: RDA Method  RDA Method (mL): 1850  CHO Requirement: 231      Nutrition Prescription Ordered    Current Diet Order: NPO    Evaluation of Received Nutrient/Fluid Intake    % Kcal Needs: 0  % Protein Needs: 0  I/O: 0.5 L  Energy Calories Required: not meeting needs  Protein Required: not meeting needs  Fluid Required: not meeting needs  Comments: LBM 9/15  % Intake of Estimated Energy Needs: 0 - 25 %  % Meal Intake: NPO    Nutrition Risk    Level of Risk/Frequency of Follow-up: high       Monitor and Evaluation    Food and Nutrient Intake: energy intake, food and beverage intake  Food and Nutrient Adminstration: diet order  Knowledge/Beliefs/Attitudes: food and nutrition knowledge/skill, beliefs and attitudes  Physical Activity and Function: nutrition-related ADLs and IADLs  Anthropometric Measurements: weight, weight change, body mass index  Biochemical Data, Medical Tests and Procedures: electrolyte and renal panel, gastrointestinal profile, glucose/endocrine profile, inflammatory profile, lipid profile  Nutrition-Focused Physical Findings:  overall appearance       Nutrition Follow-Up    RD Follow-up?: Yes    Simeon Sarabia, Dietitian

## 2022-09-23 NOTE — INTERVAL H&P NOTE
The patient has been examined and the H&P has been reviewed:    I concur with the findings and no changes have occurred since H&P was written.    Anesthesia/Surgery risks, benefits and alternative options discussed and understood by patient/family.          Active Hospital Problems    Diagnosis  POA    *Acute osteomyelitis of left calcaneus [M86.172]  Yes    Peripheral artery disease [I73.9]  Yes    Infection of deep incisional surgical site after procedure [T81.42XA]  Yes    Stage 3 chronic kidney disease [N18.30]  Yes    Long term current use of immunosuppressive drug [Z79.899]  Not Applicable    Kidney transplant recipient [Z94.0]  Not Applicable    H/O amputation [Z89.9]  Yes    Chronic obstructive pulmonary disease [J44.9]  Yes    Coronary artery disease of native artery of native heart with stable angina pectoris [I25.118]  Yes    Type 2 diabetes mellitus with hyperglycemia, with long-term current use of insulin [E11.65, Z79.4]  Not Applicable     donor kidney transplant for DM 16 [Z94.0]  Not Applicable     Chronic     Induction with Thymo x3 and IV solumedrol to total 875mg    Kidney Biopsy   2016: 16 glomeruli, ACR type 1 AVR type 2, significant microcirculatory changes, c4d negative, No DSA, 5 to10% fibrosis. Treated with thymo x8 (5 full, 3 half doses)  2016: insufficient sample since it had no glomeruli; C4d was negative in peritubular capillaries but insufficient tissue to comment on presence of rejection  8/15/16: 25 glomeruli, moderate microcirculatory inflammation, positive C4d (20-30%), interstitial infiltrate with tubulitis but <5% thus not meeting diagnostic criteria for ACR but suspicious for ACR, no AVR, CNI toxicity, ~10% interstitial fibrosis, 7 foci of calcium phos crystals. Rx SM pulsex3 in BR -16 Endothelial cell crossmatch against target cells EC1 and EC2 negative, no DILIA antibodies detected. AT1R also negative. At present time no change in management.  (resulted 8/29)        Essential hypertension [I10]  Yes      Resolved Hospital Problems    Diagnosis Date Resolved POA    Hyponatremia [E87.1] 09/22/2022 Yes

## 2022-09-24 PROBLEM — E87.8 ELECTROLYTE ABNORMALITY: Status: ACTIVE | Noted: 2022-01-01

## 2022-09-24 NOTE — PROGRESS NOTES
"Reported BP to NP. Order placed for bolus   BP (!) 82/54   Pulse (!) 58   Temp 98.2 °F (36.8 °C) (Oral)   Resp 16   Ht 5' 11" (1.803 m)   Wt 93.5 kg (206 lb 2.1 oz)   SpO2 98%   BMI 28.75 kg/m²      @1445 repeat BP BP (!) 86/46   Pulse (!) 56   Temp 98.2 °F (36.8 °C) (Oral)   Resp 16   Ht 5' 11" (1.803 m)   Wt 93.5 kg (206 lb 2.1 oz)   SpO2 98%   BMI 28.75 kg/m²   BG 75    Notified NP of the above will implement orders    "

## 2022-09-24 NOTE — ASSESSMENT & PLAN NOTE
Patient's FSGs are uncontrolled due to hyperglycemia on current medication regimen.  Last A1c reviewed-   Lab Results   Component Value Date    HGBA1C 7.4 (H) 08/04/2022     Most recent fingerstick glucose reviewed-   Recent Labs   Lab 09/24/22  0414 09/24/22  0528 09/24/22  0746 09/24/22  1040   POCTGLUCOSE 164* 134* 85 97     Current correctional scale  Low  Maintain anti-hyperglycemic dose as follows-   Antihyperglycemics (From admission, onward)    None        Levemir added- changed to bid dosing and moderate sliding scale - dose increased from 16 units to 30 Units  Hold Oral hypoglycemics while patient is in the hospital.  9/24/22 The patient had a rapid response called over night. His blood glucose dropped to <20. He was given an amp of D50 and he returned to baseline.

## 2022-09-24 NOTE — PROGRESS NOTES
Attempted to follow up on Mr Ladd for wound vac change. Patient refusing and states to leave him alone. Team notified.

## 2022-09-24 NOTE — ASSESSMENT & PLAN NOTE
9/14/22: c/o of left ankle pain-controlled. Pt was scheduled for surgery, however, surgery was post-poned 2/2 hyponatremia -Na 126. WBC normal, afebrile. Blood cultures show NGTD. .3. cont IV Abx  09/15/22- removal of the external fixator and debridement of osteomyelitis planned for today- procedure postponed due to hyponatremia- Lasix given - Nephrology following   9/16/22 - procedure postponed due to hyperglycemia  9/17/22 - post op day 1 - ABX in progress  9/19/22 - post op day 3 - Cefepime continued, Zyvox stopped. NWB. Wound care consulted for wound vac changes  9/20/22 - bone cultures pending. Aerobic culture isolated proteus mirabils  9/21/22 - Proteus mirabilis and B. faecis - Unasyn  9/22/22 - 6 weeks of Rocephin and Flagyl per Dr. Veliz  9/24/22 Vascular surgery was unable to complete the angiogram yesterday d/t refusing it. Will await further recs by Vascular surgery. Continue treatment with IV ABX

## 2022-09-24 NOTE — PLAN OF CARE
Problem: Adult Inpatient Plan of Care  Goal: Patient-Specific Goal (Individualized)  Outcome: Ongoing, Progressing     Mentation waxes and wanes   SB on tele   Hypotensive this shift - midodrine added and 1/2 L bolus x 1 infused  Afebrile   AC and HS w/ prns in place   Mag rider infused   PO K administered  Labs ordered for AM   L vac in place   Picc line with out complications   IV abx continued   Fall precautions in place
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
## 2022-09-24 NOTE — NURSING
Rounding on this patient and I noticed that his wound vac had come disconnected, I asked him how he was doing and he did not respond and had the cover draped over his head. After pulling the covers back I noticed his eyes were open and pupils were fixed and dilated. He as agonal breathing and slumped over.     I did a quick assessment and sternal rub and he did not respond, the CNA took his vital signs as I contacted the DrShaynaon call. The  On call requested a STAT CT and Tele stroke based on my observation I was under the impression he was having a stroke. Patient had a scheduled LHC for 09/23/2022 and was told he was unable to complete.     Upon arriving on my shift patient was still on NPO and I messaged provider if they wanted to change diet order and did so. I offered the patient something to eat and drink and he refused and stated he wanted to go to sleep.     Later in the shift he requested something to eat and was given a meal but refused any other care.

## 2022-09-24 NOTE — ASSESSMENT & PLAN NOTE
-Orthopedic Surgery following   -IV antibiotics  - Rocephin and Flagyl x 6 weeks  Proteus Mirabilis and B. faecis   -NWB LLE  DVT prophylaxis 24 hours after surgery   -Post op removal of the external fixator and debridement of osteomyelitis   -analgesia as needed   Per Ortho -  He just needs to be set up with home health for wound checks and may follow-up with ortho next week for a recheck ( pt to return to [facility])

## 2022-09-24 NOTE — CARE UPDATE
Called to bedside for possible stroke. On assessment, pt unresponsive and diaphoretic with agonal breathing. Stat glucose obtained and read <20. Pt given one amp of D50 with mild improvement in responsiveness. Another amp of D50 given -slowly pt returned to baseline to awake and alert status. Respirations even and unlabored. O2sats 95% on room air. BP stable. Glucose now over 300.     Critical care time 40 minutes

## 2022-09-24 NOTE — SUBJECTIVE & OBJECTIVE
Interval History: The patient had a rapid response called over night. His blood glucose dropped to <20. He was given an amp of D50 and he returned to baseline. Today he was noted to have a K+ 2.8 and Mg 1.4, Will replete. Vascular surgery was unable to complete the angiogram yesterday d/t refusing it. Will await further recs by Vascular surgery.     Review of Systems   Constitutional:  Positive for activity change and fatigue. Negative for appetite change, chills, fever and unexpected weight change.   HENT:  Negative for congestion, facial swelling, rhinorrhea, sinus pressure, sneezing and sore throat.    Eyes:  Negative for discharge, redness and visual disturbance.   Respiratory:  Negative for apnea, cough, chest tightness, shortness of breath, wheezing and stridor.    Cardiovascular:  Negative for chest pain, palpitations and leg swelling.   Gastrointestinal:  Positive for abdominal pain and nausea. Negative for abdominal distention, anal bleeding, blood in stool, constipation, diarrhea and vomiting.   Genitourinary:  Negative for difficulty urinating, dysuria, frequency, hematuria and penile discharge.   Musculoskeletal:  Positive for gait problem. Negative for arthralgias, back pain, joint swelling, myalgias, neck pain and neck stiffness.   Skin:  Positive for wound. Negative for color change and pallor.   Neurological:  Positive for weakness. Negative for dizziness, tremors, seizures, syncope, facial asymmetry, speech difficulty, light-headedness, numbness and headaches.   Psychiatric/Behavioral:  Negative for behavioral problems, confusion, hallucinations, sleep disturbance and suicidal ideas. The patient is not nervous/anxious.    All other systems reviewed and are negative.  Objective:     Vital Signs (Most Recent):  Temp: 97.9 °F (36.6 °C) (09/24/22 0746)  Pulse: (!) 54 (09/24/22 1105)  Resp: 16 (09/24/22 0910)  BP: (!) 150/65 (09/24/22 0746)  SpO2: (!) 93 % (09/24/22 1105)   Vital Signs (24h  Range):  Temp:  [97.5 °F (36.4 °C)-99 °F (37.2 °C)] 97.9 °F (36.6 °C)  Pulse:  [54-67] 54  Resp:  [16-20] 16  SpO2:  [93 %-100 %] 93 %  BP: (112-161)/(64-78) 150/65     Weight: 93.5 kg (206 lb 2.1 oz)  Body mass index is 28.75 kg/m².  No intake or output data in the 24 hours ending 09/24/22 1237   Physical Exam  Vitals and nursing note reviewed.   Constitutional:       General: He is not in acute distress.     Appearance: He is well-developed. He is ill-appearing. He is not toxic-appearing.   HENT:      Head: Normocephalic and atraumatic.   Eyes:      Conjunctiva/sclera: Conjunctivae normal.      Pupils: Pupils are equal, round, and reactive to light.   Cardiovascular:      Rate and Rhythm: Normal rate and regular rhythm.      Heart sounds: Normal heart sounds. No murmur heard.  Pulmonary:      Effort: Pulmonary effort is normal. No respiratory distress.      Breath sounds: Normal breath sounds.   Abdominal:      General: Bowel sounds are normal. There is no distension.      Palpations: Abdomen is soft.      Tenderness: There is abdominal tenderness.   Musculoskeletal:         General: Deformity present. No tenderness.      Cervical back: Normal range of motion and neck supple.      Left foot: Deformity present.      Comments: Left tma, wound vac in place      Left Lower Extremity: Left leg is amputated below knee.   Skin:     General: Skin is warm and dry.      Findings: Lesion present. No erythema or rash.   Neurological:      Mental Status: He is alert and oriented to person, place, and time.      Motor: Weakness present.   Psychiatric:         Behavior: Behavior normal.       Significant Labs: All pertinent labs within the past 24 hours have been reviewed.    Significant Imaging:   Imaging Results              X-Ray Ankle Complete Left (Final result)  Result time 09/13/22 15:36:47      Final result by ANA CRISTINA Joseph Sr., MD (09/13/22 15:36:47)                   Impression:      1. There is no radiographic  evidence of acute osteomyelitis.  2. There is minimal callus formation across the fractures in the distal aspect of the tibia. There is an external fixator across the fractures of the tibia.  3. There is a mild amount of callus formation across a fracture in the distal portion of the fibula.      Electronically signed by: Porter Joseph MD  Date:    09/13/2022  Time:    15:36               Narrative:    EXAMINATION:  XR ANKLE COMPLETE 3 VIEW LEFT    CLINICAL HISTORY:  Other acute postprocedural pain    COMPARISON:  Comparison was made to plain film examinations of the left ankle dating back to 08/04/2022.    FINDINGS:  There is minimal callus formation across the fractures in the distal aspect of the tibia.  There is an external fixator across the fractures of the tibia.  There is a mild amount of callus formation across a fracture in the distal portion of the fibula.  There is no dislocation.  There is no radiographic evidence of acute osteomyelitis.  There are surgical changes associated with a prior amputation of the left forefoot.

## 2022-09-25 NOTE — PLAN OF CARE
Problem: Adult Inpatient Plan of Care  Goal: Patient-Specific Goal (Individualized)  Outcome: Ongoing, Progressing     Pt is at times confused/forgetful   Agitated/frustrated with circumstances and pain  SB on tele   BP stable- midodrine continued   Afebrile   AC and HS w/ prns in place   Labs ordered for AM   c/o abd pain and diarrhea medications added see mar  LLSHALOM vac in place seal intact  Picc line with out complications   IV abx continued   Fall precautions in place
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
## 2022-09-25 NOTE — ASSESSMENT & PLAN NOTE
9/14/22: c/o of left ankle pain-controlled. Pt was scheduled for surgery, however, surgery was post-poned 2/2 hyponatremia -Na 126. WBC normal, afebrile. Blood cultures show NGTD. .3. cont IV Abx  09/15/22- removal of the external fixator and debridement of osteomyelitis planned for today- procedure postponed due to hyponatremia- Lasix given - Nephrology following   9/16/22 - procedure postponed due to hyperglycemia  9/17/22 - post op day 1 - ABX in progress  9/19/22 - post op day 3 - Cefepime continued, Zyvox stopped. NWB. Wound care consulted for wound vac changes  9/20/22 - bone cultures pending. Aerobic culture isolated proteus mirabils  9/21/22 - Proteus mirabilis and B. faecis - Unasyn  9/22/22 - 6 weeks of Rocephin and Flagyl per Dr. Veliz  9/24/22 Vascular surgery was unable to complete the angiogram yesterday d/t refusing it. Will await further recs by Vascular surgery. Continue treatment with IV ABX  9/25/22 Ortho is recommending a BKA per vascular surgery. Awaiting Vascular surgery recs. Continue current management with IV ABX.

## 2022-09-25 NOTE — SUBJECTIVE & OBJECTIVE
Interval History: No acute events overnight. The patient continues to endorse abdominal pain and reports intermittent diarrhea. Will check ABD x-ray and add questran and probiotics. K+ is improved today. Ortho is recommending a BKA per vascular surgery. Awaiting Vascular surgery recs. Continue current management with IV ABX.     Review of Systems   Constitutional:  Positive for activity change and fatigue. Negative for appetite change, chills, fever and unexpected weight change.   HENT:  Negative for congestion, facial swelling, rhinorrhea, sinus pressure, sneezing and sore throat.    Eyes:  Negative for discharge, redness and visual disturbance.   Respiratory:  Negative for apnea, cough, chest tightness, shortness of breath, wheezing and stridor.    Cardiovascular:  Negative for chest pain, palpitations and leg swelling.   Gastrointestinal:  Positive for abdominal pain and nausea. Negative for abdominal distention, anal bleeding, blood in stool, constipation, diarrhea and vomiting.   Genitourinary:  Negative for difficulty urinating, dysuria, frequency, hematuria and penile discharge.   Musculoskeletal:  Positive for gait problem. Negative for arthralgias, back pain, joint swelling, myalgias, neck pain and neck stiffness.   Skin:  Positive for wound. Negative for color change and pallor.   Neurological:  Positive for weakness. Negative for dizziness, tremors, seizures, syncope, facial asymmetry, speech difficulty, light-headedness, numbness and headaches.   Psychiatric/Behavioral:  Negative for behavioral problems, confusion, hallucinations, sleep disturbance and suicidal ideas. The patient is not nervous/anxious.    All other systems reviewed and are negative.  Objective:     Vital Signs (Most Recent):  Temp: 98.9 °F (37.2 °C) (09/25/22 1124)  Pulse: 72 (09/25/22 1124)  Resp: 18 (09/25/22 1154)  BP: 136/68 (09/25/22 1124)  SpO2: 95 % (09/25/22 1124) Vital Signs (24h Range):  Temp:  [97.3 °F (36.3 °C)-99.1 °F (37.3  °C)] 98.9 °F (37.2 °C)  Pulse:  [52-73] 72  Resp:  [16-20] 18  SpO2:  [92 %-98 %] 95 %  BP: ()/(45-68) 136/68     Weight: 95.4 kg (210 lb 5.1 oz)  Body mass index is 29.33 kg/m².    Intake/Output Summary (Last 24 hours) at 9/25/2022 1215  Last data filed at 9/24/2022 1750  Gross per 24 hour   Intake 739.55 ml   Output 50 ml   Net 689.55 ml      Physical Exam  Vitals and nursing note reviewed.   Constitutional:       General: He is not in acute distress.     Appearance: He is well-developed. He is ill-appearing. He is not toxic-appearing.   HENT:      Head: Normocephalic and atraumatic.   Eyes:      Conjunctiva/sclera: Conjunctivae normal.      Pupils: Pupils are equal, round, and reactive to light.   Cardiovascular:      Rate and Rhythm: Normal rate and regular rhythm.      Heart sounds: Normal heart sounds. No murmur heard.  Pulmonary:      Effort: Pulmonary effort is normal. No respiratory distress.      Breath sounds: Normal breath sounds.   Abdominal:      General: Bowel sounds are normal. There is no distension.      Palpations: Abdomen is soft.      Tenderness: There is abdominal tenderness.   Musculoskeletal:         General: Deformity present. No tenderness.      Cervical back: Normal range of motion and neck supple.      Left foot: Deformity present.      Comments: Left tma, wound vac in place      Left Lower Extremity: Left leg is amputated below knee.   Skin:     General: Skin is warm and dry.      Findings: Lesion present. No erythema or rash.   Neurological:      Mental Status: He is alert and oriented to person, place, and time.      Motor: Weakness present.   Psychiatric:         Behavior: Behavior normal.       Significant Labs: All pertinent labs within the past 24 hours have been reviewed.    Significant Imaging:   Imaging Results              X-Ray Ankle Complete Left (Final result)  Result time 09/13/22 15:36:47      Final result by ANA CRISTINA Joseph Sr., MD (09/13/22 15:36:47)                    Impression:      1. There is no radiographic evidence of acute osteomyelitis.  2. There is minimal callus formation across the fractures in the distal aspect of the tibia. There is an external fixator across the fractures of the tibia.  3. There is a mild amount of callus formation across a fracture in the distal portion of the fibula.      Electronically signed by: Porter Joseph MD  Date:    09/13/2022  Time:    15:36               Narrative:    EXAMINATION:  XR ANKLE COMPLETE 3 VIEW LEFT    CLINICAL HISTORY:  Other acute postprocedural pain    COMPARISON:  Comparison was made to plain film examinations of the left ankle dating back to 08/04/2022.    FINDINGS:  There is minimal callus formation across the fractures in the distal aspect of the tibia.  There is an external fixator across the fractures of the tibia.  There is a mild amount of callus formation across a fracture in the distal portion of the fibula.  There is no dislocation.  There is no radiographic evidence of acute osteomyelitis.  There are surgical changes associated with a prior amputation of the left forefoot.

## 2022-09-25 NOTE — NURSING
Received lying in bed awake and alert, assessment per flowsheet, wound vac in place, denies pain at this time, bed low, call light within reach.

## 2022-09-25 NOTE — ASSESSMENT & PLAN NOTE
9/24/22 Today he was noted to have a K+ 2.8 and Mg 1.4, Will replete.   9/25/22 K+ is improved today. K+ 5.0

## 2022-09-25 NOTE — PROGRESS NOTES
Ortho Daily Progress Note    Lavelle Ladd is a 67 y.o. male admitted on 9/13/2022        Hospital Day: 11    Post Op Day: POD 9 s/p left lower extremity external fixture removal and subsequent I and D of the left calcaneus performed by Dr. Kathleen Zazueta on September 16, 2022    SUBJECTIVE:  The patient was seen and examined this morning at the bedside. Patient reports nausea and severe abdominal pain and diarrhea for the last 24 hours.  He reports minimal discomfort in the left lower leg.  Patient declined the angiogram on his left leg yesterday and also declined wound VAC change.    _______________    OBJECTIVE:    Vital Signs (Most Recent)  Vitals:    09/25/22 0842   BP:    Pulse:    Resp: 18   Temp:        Intake/Output Summary (Last 24 hours) at 9/25/2022 0859  Last data filed at 9/24/2022 1750  Gross per 24 hour   Intake 739.55 ml   Output 50 ml   Net 689.55 ml        Patient is in mild distress secondary to abdominal pain and nausea  AAOx3  left lower extremity : The tibia is clean dry and intact and patient's wound VAC over the calcaneus clean/dry/intact; pin sites in the tibia patient has obvious deformity of the distal 3rd of the tibia from prior fracture site.  This is unstable however it is not painful with motion.  Patient has well-healed incisions from prior transmetatarsal amputation.      Labs:   Recent Lab Results  (Last 5 results in the past 24 hours)        09/25/22  0601   09/25/22  0420   09/24/22  1935   09/24/22  1603   09/24/22  1512        Albumin 2.0               Alkaline Phosphatase 184               ALT 16               Anion Gap 11               AST 16               Baso # 0.03               Basophil % 0.3               BILIRUBIN TOTAL 0.3  Comment: For infants and newborns, interpretation of results should be based  on gestational age, weight and in agreement with clinical  observations.    Premature Infant recommended reference ranges:  Up to 24 hours.............<8.0 mg/dL  Up to  48 hours............<12.0 mg/dL  3-5 days..................<15.0 mg/dL  6-29 days.................<15.0 mg/dL                 BUN 30               Calcium 9.0               Chloride 102               CO2 22               Creatinine 2.1               Differential Method Automated               eGFR 33               Eos # 0.1               Eosinophil % 0.9               Glucose 97               Gran # (ANC) 6.5               Gran % 72.1               Hematocrit 32.6               Hemoglobin 10.2               Immature Grans (Abs) 0.11  Comment: Mild elevation in immature granulocytes is non specific and   can be seen in a variety of conditions including stress response,   acute inflammation, trauma and pregnancy. Correlation with other   laboratory and clinical findings is essential.                 Immature Granulocytes 1.2               Lymph # 1.3               Lymph % 14.7               MCH 28.8               MCHC 31.3               MCV 92               Mono # 1.0               Mono % 10.8               MPV 9.8               nRBC 0               Platelets 148               POCT Glucose   89   74   82   90       Potassium 5.0               PROTEIN TOTAL 6.3               RBC 3.54               RDW 15.6               Sodium 135               WBC 9.05                                     Microbiology Results (last 7 days)       Procedure Component Value Units Date/Time    Culture, Anaerobe [[id number]]  (Abnormal) Collected: 09/17/22 1033    Order Status: Completed Specimen: Wound from Leg, Left Updated: 09/22/22 1240     Anaerobic Culture BACTEROIDES SPECIES  Moderate  further identified as Bacteroides faecis      Fungus culture [[id number]] Collected: 09/17/22 1033    Order Status: Completed Specimen: Wound from Leg, Left Updated: 09/20/22 1458     Fungus (Mycology) Culture Culture in progress    Aerobic culture [[id number]]  (Abnormal)  (Susceptibility) Collected: 09/17/22 1033    Order Status: Completed Specimen:  Wound from Leg, Left Updated: 09/20/22 0551     Aerobic Bacterial Culture PROTEUS MIRABILIS  Few  Skin skylar also present      AFB Culture & Smear [[id number]] Collected: 09/17/22 1033    Order Status: Completed Specimen: Wound from Leg, Left Updated: 09/19/22 1504     AFB Culture & Smear Culture in progress     AFB CULTURE STAIN No acid fast bacilli seen.    Blood Culture #1 **CANNOT BE ORDERED STAT** [[id number]] Collected: 09/13/22 1434    Order Status: Completed Specimen: Blood from Peripheral, Forearm, Right Updated: 09/18/22 2312     Blood Culture, Routine No growth after 5 days.    Blood Culture #2 **CANNOT BE ORDERED STAT** [[id number]] Collected: 09/13/22 1447    Order Status: Completed Specimen: Blood from Wrist, Right Updated: 09/18/22 2312     Blood Culture, Routine No growth after 5 days.             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  Patient appears to be open to a left below-the-knee amputation.  He has declined intervention from vascular surgery in the form of angiogram yesterday.  Again from orthopedic standpoint we recommend a below-the-knee amputation to alleviate the infection as well as facilitate transfers and get him back to his activities of daily living.  We will defer to vascular surgery in regards to a below-the-knee amputation if the patient would like to proceed with this course.    Weight-bearing status:  Nonweightbearing to the left lower extremity  I spoke with the nursing supervisor who is obtaining a Cam boot for the patient's left lower extremity to facilitate access with wound care as well as  stabilization of the fracture.  PT/OT  Pain Control  Wound care:  patient is currently declining wound VAC however it is currently to suction and seal.  Antibiotics:  Per ID recs  DVT Prophylaxis:  Per primary team           Daisy Mckenzie MD, WILL, FAAOS  Orthopaedic Surgeon

## 2022-09-25 NOTE — ASSESSMENT & PLAN NOTE
Left TMA  Right BKA  Both amputation incision sites clean, healed, and intact     Ortho advising patient he may need a left BKA  9/25/22 Ortho is recommending a BKA per vascular surgery. Awaiting Vascular surgery recs. Continue current management with IV ABX.

## 2022-09-25 NOTE — ASSESSMENT & PLAN NOTE
Patient's FSGs are uncontrolled due to hyperglycemia on current medication regimen.  Last A1c reviewed-   Lab Results   Component Value Date    HGBA1C 7.4 (H) 08/04/2022     Most recent fingerstick glucose reviewed-   Recent Labs   Lab 09/24/22  1603 09/24/22  1935 09/25/22  0420 09/25/22  1117   POCTGLUCOSE 82 74 89 131*     Current correctional scale  Low  Maintain anti-hyperglycemic dose as follows-   Antihyperglycemics (From admission, onward)    None        Levemir added- changed to bid dosing and moderate sliding scale - dose increased from 16 units to 30 Units  Hold Oral hypoglycemics while patient is in the hospital.  9/24/22 The patient had a rapid response called over night. His blood glucose dropped to <20. He was given an amp of D50 and he returned to baseline.

## 2022-09-26 NOTE — PLAN OF CARE
Aox4. Able to verbalize needs & follow commands. Calm & cooperative throughout shift.   POC reviewed with pt. Interventions implemented as appropriate.    VSS; SR on tele-monitor.  On RA.    PICC patent. Integrity maintained.    Receiving IV abx. No adverse reactions noted.  Wound vac to LLE. Numerous scabs scattered. No new skin issues.    C/O pain. Alleviated w/ prn Noroco 10 mg. Has morphine 2 mg for breakthrough pain.  Incontinent of b/b. Incontinence pad changed as needed.    CBG Q6H. SSI ordered.  Heparin SQ for VTE.  Bed bound. Activity ad kalia. Frequent position changes encouraged. Able to reposition in bed independently.  Educated on s/sx of pressure injury;  verbalized understanding.  NADN. Resting quietly in bed.   Free of falls. Hourly rounding complete.   All safety measures remain in place. SR up x2; bed low & locked. Bed alarm on and audible. Call light w/in reach.   Will continue to monitor throughout shift.
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
## 2022-09-26 NOTE — ASSESSMENT & PLAN NOTE
9/24/22 Today he was noted to have a K+ 2.8 and Mg 1.4, Will replete.   9/25/22 K+ is improved today. K+ 5.0  9/26/22 K+ 5.6 today, will give a dose of lokelma.

## 2022-09-26 NOTE — PROGRESS NOTES
[facility] - Telemetry ([facility])  Orthopedics  Progress Note    Patient Name: Lavelle Ladd  MRN: [id number]  Admission Date: 9/13/2022  Hospital Length of Stay: 12 days  Attending Provider: Mando Delgado MD  Primary Care Provider: Primary Doctor No  Follow-up For: Procedure(s) (LRB):  ANGIOGRAM, LOWER EXTREMITY/left leg (N/A)    Post-Operative Day: 3 Days Post-Op  Subjective:     Principal Problem:Acute osteomyelitis of left calcaneus    Principal Orthopedic Problem:  Same as above    Interval History: Lavelle Ladd is a 67-year-old male postop day 9 status post left lower extremity external fixation removal and incision and drainage of left calcaneus.  Patient is resting comfortably in bed.  No new complaints at this time    Review of patient's allergies indicates:  No Known Allergies    Current Facility-Administered Medications   Medication    0.9%  NaCl infusion    0.9%  NaCl infusion    acetaminophen tablet 650 mg    albuterol-ipratropium 2.5 mg-0.5 mg/3 mL nebulizer solution 3 mL    aluminum-magnesium hydroxide-simethicone 200-200-20 mg/5 mL suspension 30 mL    carvediloL tablet 12.5 mg    cefTRIAXone (ROCEPHIN) 2 g/50 mL D5W IVPB    cholestyramine 4 gram packet 4 g    dextrose 10% bolus 125 mL    dextrose 10% bolus 250 mL    dicyclomine capsule 10 mg    epoetin dyana-epbx injection 4,880 Units    gabapentin capsule 100 mg    glucagon (human recombinant) injection 1 mg    glucose chewable tablet 16 g    glucose chewable tablet 24 g    heparin (porcine) injection 5,000 Units    HYDROcodone-acetaminophen  mg per tablet 1 tablet    insulin aspart U-100 pen 0-5 Units    Lactobacillus acidoph-L.bulgar 1 million cell tablet 4 tablet    linaCLOtide capsule 145 mcg    magnesium oxide tablet 400 mg    magnesium oxide tablet 800 mg    magnesium oxide tablet 800 mg    melatonin tablet 6 mg    metroNIDAZOLE tablet 500 mg    morphine injection 2 mg    naloxone 0.4 mg/mL injection 0.02 mg    ondansetron disintegrating  "tablet 4 mg    ondansetron injection 4 mg    potassium chloride SA CR tablet 40 mEq    prochlorperazine injection Soln 5 mg    sertraline tablet 25 mg    simethicone chewable tablet 160 mg    sodium chloride 0.9% flush 10 mL    sodium zirconium cyclosilicate packet 10 g    tacrolimus capsule 1 mg     Objective:     Vital Signs (Most Recent):  Temp: 98.7 °F (37.1 °C) (09/26/22 0726)  Pulse: 68 (09/26/22 0726)  Resp: 18 (09/26/22 0834)  BP: (!) 104/54 (09/26/22 0726)  SpO2: 95 % (09/26/22 0726)   Vital Signs (24h Range):  Temp:  [97.5 °F (36.4 °C)-98.9 °F (37.2 °C)] 98.7 °F (37.1 °C)  Pulse:  [62-75] 68  Resp:  [16-20] 18  SpO2:  [95 %-99 %] 95 %  BP: (104-167)/(54-76) 104/54     Weight: 95.4 kg (210 lb 5.1 oz)  Height: 5' 11" (180.3 cm)  Body mass index is 29.33 kg/m².      Intake/Output Summary (Last 24 hours) at 9/26/2022 0837  Last data filed at 9/26/2022 0700  Gross per 24 hour   Intake --   Output 75 ml   Net -75 ml       Ortho/SPM Exam  Left lower extremity:   Wound VAC is intact and working appropriately over the calcaneal I&D sites   There is an obvious deformity of the distal 3rd of the tibia  Fracture is unstable on physical exam, but there is no pain with this motion     GEN: Well developed, well nourished male. AAOX3. No acute distress.   Head: Normocephalic, atraumatic.   Eyes: [place]  Neck: Trachea is midline, no adenopathy  Resp: Breathing unlabored.  Neuro: Motor function normal, Cranial nerves intact  Psych: Mood and affect appropriate.      Significant Labs: CBC:   Recent Labs   Lab 09/25/22  0601 09/26/22  0723   WBC 9.05 7.98   HGB 10.2* 9.8*   HCT 32.6* 32.1*   * 160     CMP:   Recent Labs   Lab 09/25/22  0601 09/26/22  0723   * 131*   K 5.0 5.6*    100   CO2 22* 23   GLU 97 144*   BUN 30* 35*   CREATININE 2.1* 2.6*   CALCIUM 9.0 9.0   PROT 6.3 6.2   ALBUMIN 2.0* 2.1*   BILITOT 0.3 0.5   ALKPHOS 184* 174*   AST 16 15   ALT 16 14   ANIONGAP 11 8     POCT Glucose:   Recent Labs "   Lab 22  1927 22  0042 22  0621   POCTGLUCOSE 207* 217* 147*     All pertinent labs within the past 24 hours have been reviewed.    Significant Imaging: None    Assessment/Plan:     Active Diagnoses:    Diagnosis Date Noted POA    PRINCIPAL PROBLEM:  Acute osteomyelitis of left calcaneus [M86.172] 2022 Yes    Electrolyte abnormality [E87.8] 2022 No    Peripheral artery disease [I73.9] 2022 Yes    Infection of deep incisional surgical site after procedure [T81.42XA] 09/15/2022 Yes    Stage 3 chronic kidney disease [N18.30]  Yes    Long term current use of immunosuppressive drug [Z79.899] 2018 Not Applicable    Kidney transplant recipient [Z94.0] 2017 Not Applicable    H/O amputation [Z89.9] 2016 Yes    Chronic obstructive pulmonary disease [J44.9] 2016 Yes    Coronary artery disease of native artery of native heart with stable angina pectoris [I25.118] 2016 Yes    Type 2 diabetes mellitus with hyperglycemia, with long-term current use of insulin [E11.65, Z79.4]  Not Applicable     donor kidney transplant for DM 16 [Z94.0]  Not Applicable     Chronic    Essential hypertension [I10] 2015 Yes      Problems Resolved During this Admission:    Diagnosis Date Noted Date Resolved POA    Hyponatremia [E87.1] 2022 Yes     Assessment:   67-year-old male postop day 9 status post left lower extremity external fixation removal and incision and drainage of left calcaneus    Plan:   NWB LLE  Fracture boot was placed to the left lower extremity, patient tolerated this well and says that it is comfortable   PT/OT   Antibiotics: per ID   DVT prophylaxis: per primary team   Wound care:  Patient has wound VAC, but has been refusing wound VAC changes, however the wound VAC continues to function appropriately at this time    Tam Mcdermott PA-C  Orthopedics  [facility] ([facility])

## 2022-09-26 NOTE — SUBJECTIVE & OBJECTIVE
Interval History: No acute events overnight. The patient is alert and oriented today. He reports that he does not remember refusing the angiogram on 9/23/22. He reports that he is agreeable to the procedure. Vascular Surgery was reconsulted today. Ortho is following. Continue IV ABX. Wound vac is in place. K+ 5.6 today, will give a dose of lokelma.     Review of Systems   Constitutional:  Positive for activity change and fatigue. Negative for appetite change, chills, fever and unexpected weight change.   HENT:  Negative for congestion, facial swelling, rhinorrhea, sinus pressure, sneezing and sore throat.    Eyes:  Negative for discharge, redness and visual disturbance.   Respiratory:  Negative for apnea, cough, chest tightness, shortness of breath, wheezing and stridor.    Cardiovascular:  Negative for chest pain, palpitations and leg swelling.   Gastrointestinal:  Positive for abdominal pain and nausea. Negative for abdominal distention, anal bleeding, blood in stool, constipation, diarrhea and vomiting.   Genitourinary:  Negative for difficulty urinating, dysuria, frequency, hematuria and penile discharge.   Musculoskeletal:  Positive for gait problem. Negative for arthralgias, back pain, joint swelling, myalgias, neck pain and neck stiffness.   Skin:  Positive for wound. Negative for color change and pallor.   Neurological:  Positive for weakness. Negative for dizziness, tremors, seizures, syncope, facial asymmetry, speech difficulty, light-headedness, numbness and headaches.   Psychiatric/Behavioral:  Negative for behavioral problems, confusion, hallucinations, sleep disturbance and suicidal ideas. The patient is not nervous/anxious.    All other systems reviewed and are negative.  Objective:     Vital Signs (Most Recent):  Temp: 98.7 °F (37.1 °C) (09/26/22 0726)  Pulse: 69 (09/26/22 1207)  Resp: 18 (09/26/22 1207)  BP: (!) 122/56 (09/26/22 1207)  SpO2: 96 % (09/26/22 1207)   Vital Signs (24h Range):  Temp:   [97.5 °F (36.4 °C)-98.7 °F (37.1 °C)] 98.7 °F (37.1 °C)  Pulse:  [62-75] 69  Resp:  [16-20] 18  SpO2:  [95 %-99 %] 96 %  BP: (104-167)/(54-76) 122/56     Weight: 95.4 kg (210 lb 5.1 oz)  Body mass index is 29.33 kg/m².    Intake/Output Summary (Last 24 hours) at 9/26/2022 1444  Last data filed at 9/26/2022 0900  Gross per 24 hour   Intake 240 ml   Output 75 ml   Net 165 ml      Physical Exam  Vitals and nursing note reviewed.   Constitutional:       General: He is not in acute distress.     Appearance: He is well-developed. He is ill-appearing. He is not toxic-appearing.   HENT:      Head: Normocephalic and atraumatic.   Eyes:      Conjunctiva/sclera: Conjunctivae normal.      Pupils: Pupils are equal, round, and reactive to light.   Cardiovascular:      Rate and Rhythm: Normal rate and regular rhythm.      Heart sounds: Normal heart sounds. No murmur heard.  Pulmonary:      Effort: Pulmonary effort is normal. No respiratory distress.      Breath sounds: Normal breath sounds.   Abdominal:      General: Bowel sounds are normal. There is no distension.      Palpations: Abdomen is soft.      Tenderness: There is abdominal tenderness.   Musculoskeletal:         General: Deformity present. No tenderness.      Cervical back: Normal range of motion and neck supple.      Left foot: Deformity present.      Comments: Left tma, wound vac in place      Left Lower Extremity: Left leg is amputated below knee.   Skin:     General: Skin is warm and dry.      Findings: Lesion present. No erythema or rash.   Neurological:      Mental Status: He is alert and oriented to person, place, and time.      Motor: Weakness present.   Psychiatric:         Behavior: Behavior normal.       Significant Labs: All pertinent labs within the past 24 hours have been reviewed.    Significant Imaging:   Imaging Results              X-Ray Ankle Complete Left (Final result)  Result time 09/13/22 15:36:47      Final result by ANA CRISTINA Joseph Sr., MD  (09/13/22 15:36:47)                   Impression:      1. There is no radiographic evidence of acute osteomyelitis.  2. There is minimal callus formation across the fractures in the distal aspect of the tibia. There is an external fixator across the fractures of the tibia.  3. There is a mild amount of callus formation across a fracture in the distal portion of the fibula.      Electronically signed by: Porter Joseph MD  Date:    09/13/2022  Time:    15:36               Narrative:    EXAMINATION:  XR ANKLE COMPLETE 3 VIEW LEFT    CLINICAL HISTORY:  Other acute postprocedural pain    COMPARISON:  Comparison was made to plain film examinations of the left ankle dating back to 08/04/2022.    FINDINGS:  There is minimal callus formation across the fractures in the distal aspect of the tibia.  There is an external fixator across the fractures of the tibia.  There is a mild amount of callus formation across a fracture in the distal portion of the fibula.  There is no dislocation.  There is no radiographic evidence of acute osteomyelitis.  There are surgical changes associated with a prior amputation of the left forefoot.

## 2022-09-26 NOTE — ASSESSMENT & PLAN NOTE
9/14/22: c/o of left ankle pain-controlled. Pt was scheduled for surgery, however, surgery was post-poned 2/2 hyponatremia -Na 126. WBC normal, afebrile. Blood cultures show NGTD. .3. cont IV Abx  09/15/22- removal of the external fixator and debridement of osteomyelitis planned for today- procedure postponed due to hyponatremia- Lasix given - Nephrology following   9/16/22 - procedure postponed due to hyperglycemia  9/17/22 - post op day 1 - ABX in progress  9/19/22 - post op day 3 - Cefepime continued, Zyvox stopped. NWB. Wound care consulted for wound vac changes  9/20/22 - bone cultures pending. Aerobic culture isolated proteus mirabils  9/21/22 - Proteus mirabilis and B. faecis - Unasyn  9/22/22 - 6 weeks of Rocephin and Flagyl per Dr. Veliz  9/24/22 Vascular surgery was unable to complete the angiogram yesterday d/t refusing it. Will await further recs by Vascular surgery. Continue treatment with IV ABX  9/25/22 Ortho is recommending a BKA per vascular surgery. Awaiting Vascular surgery recs. Continue current management with IV ABX.   9/26/22 The patient is alert and oriented today. He reports that he does not remember refusing the angiogram on 9/23/22. He reports that he is agreeable to the procedure. Vascular Surgery was reconsulted today. Ortho is following. Continue IV ABX. Wound vac is in place.

## 2022-09-26 NOTE — PLAN OF CARE
Nutrition recommendations 9/26:  1. Recommend pt continue diabetic diet as medically appropriate.   2. Recommend pt recieves Arginaid packet BID to assist with wound healing  3. Collaborate with medical providers   Suraj Padillaitian

## 2022-09-26 NOTE — ASSESSMENT & PLAN NOTE
Patient's FSGs are uncontrolled due to hyperglycemia on current medication regimen.  Last A1c reviewed-   Lab Results   Component Value Date    HGBA1C 7.4 (H) 08/04/2022     Most recent fingerstick glucose reviewed-   Recent Labs   Lab 09/25/22  1927 09/26/22  0042 09/26/22  0621 09/26/22  1204   POCTGLUCOSE 207* 217* 147* 195*     Current correctional scale  Low  Maintain anti-hyperglycemic dose as follows-   Antihyperglycemics (From admission, onward)    Start     Stop Route Frequency Ordered    09/26/22 0102  insulin aspart U-100 pen 0-5 Units         -- SubQ Before meals & nightly PRN 09/26/22 0102        Levemir added- changed to bid dosing and moderate sliding scale - dose increased from 16 units to 30 Units  Hold Oral hypoglycemics while patient is in the hospital.  9/24/22 The patient had a rapid response called over night. His blood glucose dropped to <20. He was given an amp of D50 and he returned to baseline.

## 2022-09-26 NOTE — PROGRESS NOTES
"[facility] - Telemetry ([facility])  Adult Nutrition  Progress Note    SUMMARY       Recommendations    Recommendation/Intervention:   1. Recommend pt continue diabetic diet as medically appropriate.   2. Recommend pt recieves Arginaid packet BID to assist with wound healing.    Goals:   1.Pt will continue to consume % PO intake  2.Pt will consume % Arginaid packet supplement for wound healing by RD follow up  Nutrition Goal Status: new  Communication of RD Recs: other (comment) (Plan of care: Sticky note)    Assessment and Plan  Nutrition Problem  Increased protein needs     Related to (etiology):   Increased demand for nutrition    Signs and Symptoms (as evidenced by):   Wound     Interventions/Recommendations (treatment strategy):  1. Recommend pt continue diabetic diet as medically appropriate.   2. Recommend pt recieves Arginaid packet BID to assist with wound healing  3. Collaborate with medical providers     Nutrition Diagnosis Status:   New    Reason for Assessment    Reason For Assessment: consult, RD follow-up, length of stay  Diagnosis: infection/sepsis  Relevant Medical History: HTN, T2DM, CAD, COPD, H/O amputation, CKD3  Interdisciplinary Rounds: did not attend  General Information Comments: 9/23/22: RD assess pt for consult of needing oral supplument, LOS and RD follow up. Pt states that he does not feel well and this has cause him not to have a appetite. when the pt was asked how much food he was consuming he replied "I dont know" the RD estimates this amount to be 0-25%. The pt states that his appetite has been like this for the last three though four days. The pt says that he has experiencing v/n but would not specifi how freakqently or how often. The pt complains that his wound is bothering him. Based on his PMH, I believe Suplena with carb steady is a good supplument for this pt.  Nutrition Discharge Planning: cardiac, renal diet    Follow up  9/26: Visited pt twice at bedside, pt sleeping " "both times. Spoke to RN, confirmed PO intake at 100%. Reported pt has not been receiving Arginaid. Brought RN two packets of Arginaid personally. Reviewed labs, noted high potassium, recommended packets versus supplement drink d/t lower potassium in packets. LBM 9/25. Labs and weight reviewed. RD will continue to monitor.     Nutrition Risk Screen    Nutrition Risk Screen: large or nonhealing wound, burn or pressure injury    Nutrition/Diet History    Spiritual, Cultural Beliefs, Alevism Practices, Values that Affect Care: no  Food Allergies: NKFA  Factors Affecting Nutritional Intake: decreased appetite    Anthropometrics    Temp: 98.7 °F (37.1 °C)  Height Method: Stated  Height: 5' 11" (180.3 cm)  Height (inches): 71 in  Weight Method: Bed Scale  Weight: 95.4 kg (210 lb 5.1 oz)  Weight (lb): 210.32 lb  Ideal Body Weight (IBW), Male: 172 lb  % Ideal Body Weight, Male (lb): 125.99 %  BMI (Calculated): 29.3  Amputation %: 5.9 (Right BKA)  Total Amputation %: 5.9  Amputation Ideal Body Weight (IBW), Male (lb): 166.1 lb  Amputee BMI (kg/m2): 32.21 kg/m2  Additional Documentation: Amputations Present (Ideal Body Weight)    Lab/Procedures/Meds    Pertinent Labs Reviewed: reviewed  Pertinent Labs Comments: Na 128, Cr 1.6, GFR 46, Glu 266, Mg 1.4, Alb 1.8, , ALT 94, A1c 7.4% on 8/4/22  Pertinent Medications Reviewed: reviewed  Pertinent Medications Comments: atorvastatin, carvedilol, furosemide, gabapentin, insulin, magnesium oxide, tacrolimus    Physical Findings/Assessment    Wt Readings from Last 15 Encounters:   09/25/22 95.4 kg (210 lb 5.1 oz)   09/02/22 102 kg (224 lb 13.9 oz)   08/08/22 114.7 kg (252 lb 13.9 oz)   08/15/21 102.5 kg (226 lb)   02/11/21 101.6 kg (224 lb)   10/07/20 101.6 kg (224 lb)   08/12/20 101.6 kg (224 lb)   07/08/20 104.3 kg (230 lb)   07/07/20 104.3 kg (230 lb)   07/06/20 108.1 kg (238 lb 6.4 oz)   06/30/20 104.5 kg (230 lb 6.1 oz)   06/24/20 105.4 kg (232 lb 5.8 oz)   03/04/20 104.6 " kg (230 lb 9.6 oz)   02/21/20 103.6 kg (228 lb 6.3 oz)   02/14/20 100.7 kg (222 lb)   ]    Estimated/Assessed Needs    Weight Used For Calorie Calculations: 75.5 kg (166 lb 7.2 oz)  Energy Calorie Requirements (kcal): 1850 (MSJ, AF 1.2)  Energy Need Method: Kidder-St Jeor  Protein Requirements: 75-94  Weight Used For Protein Calculations: 75.5 kg (166 lb 7.2 oz)  Fluid Requirements (mL): 1850  Estimated Fluid Requirement Method: RDA Method  RDA Method (mL): 1850  CHO Requirement: 231      Nutrition Prescription Ordered    Current Diet Order: Diabetic   Current supplements ordered: Arginaid BID       Boost Glucose Control TID    Evaluation of Received Nutrient/Fluid Intake    % Kcal Needs: 0  % Protein Needs: 0  I/O: 0.5 L  Energy Calories Required: not meeting needs  Protein Required: not meeting needs  Fluid Required: not meeting needs  Comments: LBM 9/15  % Intake of Estimated Energy Needs: 75 - 100 %  % Meal Intake: 75 - 100 %    Nutrition Risk    Level of Risk/Frequency of Follow-up: high     Monitor and Evaluation    Food and Nutrient Intake: energy intake, food and beverage intake  Food and Nutrient Adminstration: diet order  Knowledge/Beliefs/Attitudes: food and nutrition knowledge/skill, beliefs and attitudes  Physical Activity and Function: nutrition-related ADLs and IADLs  Anthropometric Measurements: weight, weight change, body mass index  Biochemical Data, Medical Tests and Procedures: electrolyte and renal panel, gastrointestinal profile, glucose/endocrine profile, inflammatory profile, lipid profile  Nutrition-Focused Physical Findings: overall appearance     Nutrition Follow-Up    RD Follow-up?: Yes  Suraj Padillaitibing

## 2022-09-26 NOTE — ASSESSMENT & PLAN NOTE
-Orthopedic Surgery following   -IV antibiotics  - Rocephin and Flagyl x 6 weeks  Proteus Mirabilis and B. faecis   -NWB LLE  DVT prophylaxis 24 hours after surgery   -Post op removal of the external fixator and debridement of osteomyelitis   -analgesia as needed   Per Ortho -  He just needs to be set up with home health for wound checks and may follow-up with ortho next week for a recheck ( pt to return to [facility])

## 2022-09-27 NOTE — ASSESSMENT & PLAN NOTE
Patient's FSGs are uncontrolled due to hyperglycemia on current medication regimen.  Last A1c reviewed-   Lab Results   Component Value Date    HGBA1C 7.4 (H) 08/04/2022     Most recent fingerstick glucose reviewed-   Recent Labs   Lab 09/26/22  1743 09/26/22  2311 09/27/22  0519 09/27/22  1242   POCTGLUCOSE 188* 232* 147* 151*     Current correctional scale  Low  Maintain anti-hyperglycemic dose as follows-   Antihyperglycemics (From admission, onward)    Start     Stop Route Frequency Ordered    09/26/22 0102  insulin aspart U-100 pen 0-5 Units         -- SubQ Before meals & nightly PRN 09/26/22 0102        Levemir added- changed to bid dosing and moderate sliding scale - dose increased from 16 units to 30 Units  Hold Oral hypoglycemics while patient is in the hospital.  9/24/22 The patient had a rapid response called over night. His blood glucose dropped to <20. He was given an amp of D50 and he returned to baseline.

## 2022-09-27 NOTE — PROGRESS NOTES
'[place] - [facility])  Orthopedics  Progress Note    Patient Name: Lavelle Ladd  MRN: [id number]  Admission Date: 9/13/2022  Hospital Length of Stay: 13 days  Attending Provider: Mando Delgado MD  Primary Care Provider: Primary Doctor No  Follow-up For: Procedure(s) (LRB):  ANGIOGRAM, LOWER EXTREMITY/left leg (N/A)    Post-Operative Day: 4 Days Post-Op  Subjective:     Principal Problem:Acute osteomyelitis of left calcaneus    Principal Orthopedic Problem:  Same as above    Interval History: Lavelle Ladd is a 67-year-old male postop day 10 status post left lower extremity external fixation removal and incision and drainage of left calcaneus for osteomyelitis.  Patient is resting comfortably in bed.  No new complaints at this time.  Patient has agreed to undergo angiogram for vascular evaluation.    Review of patient's allergies indicates:  No Known Allergies    Current Facility-Administered Medications   Medication    0.9%  NaCl infusion    0.9%  NaCl infusion    acetaminophen tablet 650 mg    albuterol-ipratropium 2.5 mg-0.5 mg/3 mL nebulizer solution 3 mL    aluminum-magnesium hydroxide-simethicone 200-200-20 mg/5 mL suspension 30 mL    carvediloL tablet 12.5 mg    cefTRIAXone (ROCEPHIN) 2 g/50 mL D5W IVPB    cholestyramine 4 gram packet 4 g    dextrose 10% bolus 125 mL    dextrose 10% bolus 250 mL    dicyclomine capsule 10 mg    epoetin dyana-epbx injection 4,880 Units    gabapentin capsule 100 mg    glucagon (human recombinant) injection 1 mg    glucose chewable tablet 16 g    glucose chewable tablet 24 g    heparin (porcine) injection 5,000 Units    HYDROcodone-acetaminophen  mg per tablet 1 tablet    insulin aspart U-100 pen 0-5 Units    Lactobacillus acidoph-L.bulgar 1 million cell tablet 4 tablet    linaCLOtide capsule 145 mcg    magnesium oxide tablet 400 mg    magnesium oxide tablet 800 mg    magnesium oxide tablet 800 mg    melatonin tablet 6 mg    metroNIDAZOLE tablet 500 mg    morphine  "injection 2 mg    naloxone 0.4 mg/mL injection 0.02 mg    ondansetron disintegrating tablet 4 mg    ondansetron injection 4 mg    prochlorperazine injection Soln 5 mg    sertraline tablet 25 mg    simethicone chewable tablet 160 mg    sodium chloride 0.9% flush 10 mL    tacrolimus capsule 1 mg     Objective:     Vital Signs (Most Recent):  Temp: 98.4 °F (36.9 °C) (09/27/22 0827)  Pulse: 72 (09/27/22 0827)  Resp: 18 (09/27/22 0827)  BP: (!) 107/54 (09/27/22 0827)  SpO2:  (pt getting a bath at this time) (09/27/22 0839)   Vital Signs (24h Range):  Temp:  [98.4 °F (36.9 °C)-99.3 °F (37.4 °C)] 98.4 °F (36.9 °C)  Pulse:  [62-76] 72  Resp:  [16-20] 18  SpO2:  [90 %-98 %] 90 %  BP: (102-161)/(54-78) 107/54     Weight: 98.4 kg (216 lb 14.9 oz)  Height: 5' 11" (180.3 cm)  Body mass index is 30.26 kg/m².      Intake/Output Summary (Last 24 hours) at 9/27/2022 0957  Last data filed at 9/26/2022 1942  Gross per 24 hour   Intake 1035.97 ml   Output --   Net 1035.97 ml       Ortho/SPM Exam  Left lower extremity:  Wound VAC is intact and working appropriately over the calcaneal I&D sites   Boot is intact and well fitting     GEN: Well developed, well nourished male. AAOX3. No acute distress.   Head: Normocephalic, atraumatic.   Eyes: [place]  Neck: Trachea is midline, no adenopathy  Resp: Breathing unlabored.  Neuro: Motor function normal, Cranial nerves intact  Psych: Mood and affect appropriate.      Significant Labs: CBC:   Recent Labs   Lab 09/26/22 0723 09/27/22  0545   WBC 7.98 7.98   HGB 9.8* 9.3*   HCT 32.1* 30.3*    153     CMP:   Recent Labs   Lab 09/26/22 0723 09/27/22  0545   * 132*   K 5.6* 5.2*    104   CO2 23 19*   * 146*   BUN 35* 37*   CREATININE 2.6* 2.6*   CALCIUM 9.0 8.9   PROT 6.2 5.9*   ALBUMIN 2.1* 2.0*   BILITOT 0.5 0.3   ALKPHOS 174* 164*   AST 15 14   ALT 14 13   ANIONGAP 8 9     POCT Glucose:   Recent Labs   Lab 09/26/22  1743 09/26/22  2311 09/27/22  0519   POCTGLUCOSE 188* " 232* 147*     All pertinent labs within the past 24 hours have been reviewed.    Significant Imaging: None    Assessment/Plan:     Active Diagnoses:    Diagnosis Date Noted POA    PRINCIPAL PROBLEM:  Acute osteomyelitis of left calcaneus [M86.172] 2022 Yes    Electrolyte abnormality [E87.8] 2022 No    Peripheral artery disease [I73.9] 2022 Yes    Infection of deep incisional surgical site after procedure [T81.42XA] 09/15/2022 Yes    Stage 3 chronic kidney disease [N18.30]  Yes    Long term current use of immunosuppressive drug [Z79.899] 2018 Not Applicable    Kidney transplant recipient [Z94.0] 2017 Not Applicable    H/O amputation [Z89.9] 2016 Yes    Chronic obstructive pulmonary disease [J44.9] 2016 Yes    Coronary artery disease of native artery of native heart with stable angina pectoris [I25.118] 2016 Yes    Type 2 diabetes mellitus with hyperglycemia, with long-term current use of insulin [E11.65, Z79.4]  Not Applicable     donor kidney transplant for DM 16 [Z94.0]  Not Applicable     Chronic    Essential hypertension [I10] 2015 Yes      Problems Resolved During this Admission:    Diagnosis Date Noted Date Resolved POA    Hyponatremia [E87.1] 2022 Yes     Assessment:   67-year-old male postop day 10 status post left lower extremity external fixation removal and incision and drainage of left calcaneus osteomyelitis     Plan:  NWB LLE  Patient should continue use of the fracture boot to the left lower extremity at all times except for dressing changes   PT/OT   Antibiotics: Per primary team   DVT prophylaxis:  Per primary team   Continue with wound care plan   Patient will undergo angiogram and vascular surgery has been re-consulted for evaluation of reperfusion vs amputation    Tam Mcdermott PA-C  Orthopedics  [facility] - Telemetry ([facility])

## 2022-09-27 NOTE — PLAN OF CARE
A230/A230 LINDSEYShayna Ladd is a 67 y.o.male admitted on 2022 for Acute osteomyelitis of left calcaneus   Code Status: DNR MRN: [id number]   Review of patient's allergies indicates:  No Known Allergies  Past Medical History:   Diagnosis Date    DINORAH (acute kidney injury) 2016    Arthritis     CAD in native artery 2019    CHF (congestive heart failure)     Chronic obstructive pulmonary disease 2016    Coronary artery disease involving native coronary artery of native heart without angina pectoris 2016    CRI (chronic renal insufficiency) 2019     donor kidney transplant for DM 16     Induction with Thymo x3 and IV solumedrol to total 875mg  Kidney Biopsy  2016: 16 glomeruli, ACR type 1 AVR type 2, significant microcirculatory changes, c4d negative, No DSA, 5 to10% fibrosis. Treated with thymo x8 2016- no rejection      Diastolic heart failure     Encounter for blood transfusion     ESRD on RRT since 10/2013 10/29/2013    Biopsy proven diabetic nephropathy and lymphoplasmacytic interstitial infiltrate not c/w with AIN (ddx sjogrens or assoc with tamm-horsefall protein extravasation)     GERD (gastroesophageal reflux disease)     History of hepatitis C, s/p successful Rx w/ SVR12 - 2017    Completed 12 weeks harvoni w/ SVR    Hyperlipidemia     Hypertension     PAD (peripheral artery disease) 2019    PIC line (peripherally inserted central catheter) flush     Prophylactic immunotherapy     Proteinuria     PVD (peripheral vascular disease) 2017    RLE BKA CT 16 Extensive atherosclerotic disease of the aorta and mesenteric arteries.     Renal hypertension     Type 2 diabetes mellitus with diabetic neuropathy, with long-term current use of insulin 2016    Vitamin B12 deficiency       PRN meds    sodium chloride 0.9%, , PRN  acetaminophen, 650 mg, Q4H PRN  albuterol-ipratropium, 3 mL, Q6H PRN  aluminum-magnesium hydroxide-simethicone, 30 mL,  QID PRN  dextrose 10%, 12.5 g, PRN  dextrose 10%, 25 g, PRN  glucagon (human recombinant), 1 mg, PRN  glucose, 16 g, PRN  glucose, 24 g, PRN  HYDROcodone-acetaminophen, 1 tablet, Q6H PRN  insulin aspart U-100, 0-5 Units, QID (AC + HS) PRN  magnesium oxide, 800 mg, PRN  magnesium oxide, 800 mg, PRN  melatonin, 6 mg, Nightly PRN  morphine, 2 mg, Q4H PRN  naloxone, 0.02 mg, PRN  ondansetron, 4 mg, Q6H PRN  prochlorperazine, 5 mg, Q6H PRN  simethicone, 2 tablet, TID PRN  sodium chloride 0.9%, 10 mL, Q8H PRN        Problem: Impaired Wound Healing  Goal: Optimal Wound Healing  Outcome: Ongoing, Progressing     Problem: Fall Injury Risk  Goal: Absence of Fall and Fall-Related Injury  Outcome: Ongoing, Progressing     Problem: Skin Injury Risk Increased  Goal: Skin Health and Integrity  Outcome: Ongoing, Progressing        Orientation: oriented x 4  Ronnell Coma Scale Score: 15  O2 Device (Oxygen Therapy): room air  Lead Monitored: Lead II Rhythm: normal sinus rhythm    Cardiac/Telemetry Box Number: [id number]  VTE Required Core Measure: Pharmacological prophylaxis initiated/maintained Last Bowel Movement: 09/26/22  Diet diabetic [facility]; 2000 Calorie  Diet NPO  Voiding Characteristics: incontinence  Matthew Score: 15  Fall Risk Score: 24  Accucheck [x]   Freq q6hr     Lines/Drains/Airways       Peripherally Inserted Central Catheter Line  Duration             PICC Double Lumen 09/18/22 1738 right basilic 9 days              Drain  Duration                  Hemodialysis AV Fistula 09/14/22 0717 Left upper arm 13 days
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
## 2022-09-27 NOTE — SUBJECTIVE & OBJECTIVE
Interval History: No acute events overnight. The patients K+ improved to 5.2 after lokelma. Vascular surgery reconsulted and plans to do angiogram on 9/29/22 to evaluate for reperfusion vs amputation. Ortho is following.     Review of Systems   Constitutional:  Positive for activity change and fatigue. Negative for appetite change, chills, fever and unexpected weight change.   HENT:  Negative for congestion, facial swelling, rhinorrhea, sinus pressure, sneezing and sore throat.    Eyes:  Negative for discharge, redness and visual disturbance.   Respiratory:  Negative for apnea, cough, chest tightness, shortness of breath, wheezing and stridor.    Cardiovascular:  Negative for chest pain, palpitations and leg swelling.   Gastrointestinal:  Positive for abdominal pain and nausea. Negative for abdominal distention, anal bleeding, blood in stool, constipation, diarrhea and vomiting.   Genitourinary:  Negative for difficulty urinating, dysuria, frequency, hematuria and penile discharge.   Musculoskeletal:  Positive for gait problem. Negative for arthralgias, back pain, joint swelling, myalgias, neck pain and neck stiffness.   Skin:  Positive for wound. Negative for color change and pallor.   Neurological:  Positive for weakness. Negative for dizziness, tremors, seizures, syncope, facial asymmetry, speech difficulty, light-headedness, numbness and headaches.   Psychiatric/Behavioral:  Negative for behavioral problems, confusion, hallucinations, sleep disturbance and suicidal ideas. The patient is not nervous/anxious.    All other systems reviewed and are negative.  Objective:     Vital Signs (Most Recent):  Temp: 98 °F (36.7 °C) (09/27/22 1241)  Pulse: 74 (09/27/22 1241)  Resp: 17 (09/27/22 1241)  BP: (!) 95/53 (09/27/22 1241)  SpO2: (!) 93 % (09/27/22 1241)   Vital Signs (24h Range):  Temp:  [98 °F (36.7 °C)-99.3 °F (37.4 °C)] 98 °F (36.7 °C)  Pulse:  [68-76] 74  Resp:  [16-20] 17  SpO2:  [90 %-98 %] 93 %  BP:  ()/(53-78) 95/53     Weight: 98.4 kg (216 lb 14.9 oz)  Body mass index is 30.26 kg/m².    Intake/Output Summary (Last 24 hours) at 9/27/2022 1447  Last data filed at 9/26/2022 1942  Gross per 24 hour   Intake 795.97 ml   Output --   Net 795.97 ml      Physical Exam  Vitals and nursing note reviewed.   Constitutional:       General: He is not in acute distress.     Appearance: He is well-developed. He is ill-appearing. He is not toxic-appearing.   HENT:      Head: Normocephalic and atraumatic.   Eyes:      Conjunctiva/sclera: Conjunctivae normal.      Pupils: Pupils are equal, round, and reactive to light.   Cardiovascular:      Rate and Rhythm: Normal rate and regular rhythm.      Heart sounds: Normal heart sounds. No murmur heard.  Pulmonary:      Effort: Pulmonary effort is normal. No respiratory distress.      Breath sounds: Normal breath sounds.   Abdominal:      General: Bowel sounds are normal. There is no distension.      Palpations: Abdomen is soft.      Tenderness: There is abdominal tenderness.   Musculoskeletal:         General: Deformity present. No tenderness.      Cervical back: Normal range of motion and neck supple.      Left foot: Deformity present.      Comments: Left tma, wound vac in place      Left Lower Extremity: Left leg is amputated below knee.   Skin:     General: Skin is warm and dry.      Findings: Lesion present. No erythema or rash.   Neurological:      Mental Status: He is alert and oriented to person, place, and time.      Motor: Weakness present.   Psychiatric:         Behavior: Behavior normal.       Significant Labs: All pertinent labs within the past 24 hours have been reviewed.    Significant Imaging:   Imaging Results              X-Ray Ankle Complete Left (Final result)  Result time 09/13/22 15:36:47      Final result by ANA CRISTINA Joseph Sr., MD (09/13/22 15:36:47)                   Impression:      1. There is no radiographic evidence of acute osteomyelitis.  2. There is  minimal callus formation across the fractures in the distal aspect of the tibia. There is an external fixator across the fractures of the tibia.  3. There is a mild amount of callus formation across a fracture in the distal portion of the fibula.      Electronically signed by: Porter Joseph MD  Date:    09/13/2022  Time:    15:36               Narrative:    EXAMINATION:  XR ANKLE COMPLETE 3 VIEW LEFT    CLINICAL HISTORY:  Other acute postprocedural pain    COMPARISON:  Comparison was made to plain film examinations of the left ankle dating back to 08/04/2022.    FINDINGS:  There is minimal callus formation across the fractures in the distal aspect of the tibia.  There is an external fixator across the fractures of the tibia.  There is a mild amount of callus formation across a fracture in the distal portion of the fibula.  There is no dislocation.  There is no radiographic evidence of acute osteomyelitis.  There are surgical changes associated with a prior amputation of the left forefoot.

## 2022-09-27 NOTE — ASSESSMENT & PLAN NOTE
9/14/22: c/o of left ankle pain-controlled. Pt was scheduled for surgery, however, surgery was post-poned 2/2 hyponatremia -Na 126. WBC normal, afebrile. Blood cultures show NGTD. .3. cont IV Abx  09/15/22- removal of the external fixator and debridement of osteomyelitis planned for today- procedure postponed due to hyponatremia- Lasix given - Nephrology following   9/16/22 - procedure postponed due to hyperglycemia  9/17/22 - post op day 1 - ABX in progress  9/19/22 - post op day 3 - Cefepime continued, Zyvox stopped. NWB. Wound care consulted for wound vac changes  9/20/22 - bone cultures pending. Aerobic culture isolated proteus mirabils  9/21/22 - Proteus mirabilis and B. faecis - Unasyn  9/22/22 - 6 weeks of Rocephin and Flagyl per Dr. Veliz  9/24/22 Vascular surgery was unable to complete the angiogram yesterday d/t refusing it. Will await further recs by Vascular surgery. Continue treatment with IV ABX  9/25/22 Ortho is recommending a BKA per vascular surgery. Awaiting Vascular surgery recs. Continue current management with IV ABX.   9/26/22 The patient is alert and oriented today. He reports that he does not remember refusing the angiogram on 9/23/22. He reports that he is agreeable to the procedure. Vascular Surgery was reconsulted today. Ortho is following. Continue IV ABX. Wound vac is in place.   9/27/22 Vascular surgery reconsulted and plans to do angiogram on 9/29/22 to evaluate for reperfusion vs amputation. Ortho is following.

## 2022-09-27 NOTE — ASSESSMENT & PLAN NOTE
9/24/22 Today he was noted to have a K+ 2.8 and Mg 1.4, Will replete.   9/25/22 K+ is improved today. K+ 5.0  9/26/22 K+ 5.6 today, will give a dose of lokelma.   9/27/22 The patients K+ improved to 5.2 after lokelma. CMP in am

## 2022-09-27 NOTE — PLAN OF CARE
Aox4. Able to verbalize needs & follow commands. Agitated every time staff goes in pt's room. Refusing to have brief changed.  POC reviewed with pt. Interventions implemented as appropriate.    VSS; SR on tele-monitor.  On RA.    PICC patent. Integrity maintained.    Receiving IV abx. No adverse reactions noted.  Wound vac to LLE. Numerous scabs scattered. No new skin issues.    No c/o pain. Has Noroco 10 mg & morphine 2 mg for breakthrough pain.  Incontinent of b/b. Incontinence pad changed as needed.    CBG Q6H. SSI ordered.  Heparin SQ for VTE.  Bed bound. Activity ad kalia. Frequent position changes encouraged. Able to reposition in bed independently.  Educated on s/sx of pressure injury;  verbalized understanding.  NADN. Resting quietly in bed.   Free of falls. Hourly rounding complete.   All safety measures remain in place. SR up x2; bed low & locked. Bed alarm on and audible. Call light w/in reach.   Will continue to monitor throughout shift.

## 2022-09-27 NOTE — PROGRESS NOTES
Wound vac currently in place with y connector to medial and lateral right foot. at -125mmHg low intensity continuous suction. Removed. lateral foot wound measures [id number]. Medial foot wound measures [id number]. Both with moist tan and red granulating wound beds and antibiotic beads and suture noted in wound bed. Wounds do appear to connect as one side wouldn't hold suction while the other was open. Cleansed with saline and gauze and patted dry. Periwound painted with cavilon. Wounds each filled with 1 piece of black foam and secured with drape. Exrta drape and foam used to bridge to dorsal foot together and for better trac pad placement. Connected to I wound vac ulta at -125mmHg. Will plan for vac change Wednesday or thursday. Patient tolerated well.

## 2022-09-27 NOTE — ASSESSMENT & PLAN NOTE
-Orthopedic Surgery following   -IV antibiotics  - Rocephin and Flagyl x 6 weeks  Proteus Mirabilis and B. faecis   -NWB LLE  DVT prophylaxis 24 hours after surgery   -Post op removal of the external fixator and debridement of osteomyelitis   -analgesia as needed   Per Ortho -  He just needs to be set up with home health for wound checks and may follow-up with ortho next week for a recheck ( pt to return to [facility])

## 2022-09-28 NOTE — SUBJECTIVE & OBJECTIVE
Interval History: No acute events overnight. K+ improved after lokelma, 5.1 today. Vascular surgery plans for a LLE angiogram in AM. NPO after MN. Ortho following     Review of Systems   Constitutional:  Positive for activity change and fatigue. Negative for appetite change, chills, fever and unexpected weight change.   HENT:  Negative for congestion, facial swelling, rhinorrhea, sinus pressure, sneezing and sore throat.    Eyes:  Negative for discharge, redness and visual disturbance.   Respiratory:  Negative for apnea, cough, chest tightness, shortness of breath, wheezing and stridor.    Cardiovascular:  Negative for chest pain, palpitations and leg swelling.   Gastrointestinal:  Positive for abdominal pain and nausea. Negative for abdominal distention, anal bleeding, blood in stool, constipation, diarrhea and vomiting.   Genitourinary:  Negative for difficulty urinating, dysuria, frequency, hematuria and penile discharge.   Musculoskeletal:  Positive for gait problem. Negative for arthralgias, back pain, joint swelling, myalgias, neck pain and neck stiffness.   Skin:  Positive for wound. Negative for color change and pallor.   Neurological:  Positive for weakness. Negative for dizziness, tremors, seizures, syncope, facial asymmetry, speech difficulty, light-headedness, numbness and headaches.   Psychiatric/Behavioral:  Negative for behavioral problems, confusion, hallucinations, sleep disturbance and suicidal ideas. The patient is not nervous/anxious.    All other systems reviewed and are negative.  Objective:     Vital Signs (Most Recent):  Temp: 100.2 °F (37.9 °C) (09/28/22 1651)  Pulse: 79 (09/28/22 1651)  Resp: 18 (09/28/22 1651)  BP: (!) 129/55 (09/28/22 1651)  SpO2: (!) 94 % (09/28/22 1651)   Vital Signs (24h Range):  Temp:  [97.6 °F (36.4 °C)-100.2 °F (37.9 °C)] 100.2 °F (37.9 °C)  Pulse:  [53-81] 79  Resp:  [18-20] 18  SpO2:  [91 %-95 %] 94 %  BP: ()/(44-88) 129/55     Weight: 96.1 kg (211 lb 13.8  oz)  Body mass index is 29.55 kg/m².    Intake/Output Summary (Last 24 hours) at 9/28/2022 1730  Last data filed at 9/28/2022 0800  Gross per 24 hour   Intake 360 ml   Output 0 ml   Net 360 ml      Physical Exam  Vitals and nursing note reviewed.   Constitutional:       General: He is not in acute distress.     Appearance: He is well-developed. He is ill-appearing. He is not toxic-appearing.   HENT:      Head: Normocephalic and atraumatic.   Eyes:      Conjunctiva/sclera: Conjunctivae normal.      Pupils: Pupils are equal, round, and reactive to light.   Cardiovascular:      Rate and Rhythm: Normal rate and regular rhythm.      Heart sounds: Normal heart sounds. No murmur heard.  Pulmonary:      Effort: Pulmonary effort is normal. No respiratory distress.      Breath sounds: Normal breath sounds.   Abdominal:      General: Bowel sounds are normal. There is no distension.      Palpations: Abdomen is soft.      Tenderness: There is abdominal tenderness.   Musculoskeletal:         General: Deformity present. No tenderness.      Cervical back: Normal range of motion and neck supple.      Left foot: Deformity present.      Comments: Left tma, wound vac in place      Left Lower Extremity: Left leg is amputated below knee.   Skin:     General: Skin is warm and dry.      Findings: Lesion present. No erythema or rash.   Neurological:      Mental Status: He is alert and oriented to person, place, and time.      Motor: Weakness present.   Psychiatric:         Behavior: Behavior normal.       Significant Labs: All pertinent labs within the past 24 hours have been reviewed.    Significant Imaging:   Imaging Results              X-Ray Ankle Complete Left (Final result)  Result time 09/13/22 15:36:47      Final result by ANA CRISTINA Joseph Sr., MD (09/13/22 15:36:47)                   Impression:      1. There is no radiographic evidence of acute osteomyelitis.  2. There is minimal callus formation across the fractures in the distal  aspect of the tibia. There is an external fixator across the fractures of the tibia.  3. There is a mild amount of callus formation across a fracture in the distal portion of the fibula.      Electronically signed by: Porter Joseph MD  Date:    09/13/2022  Time:    15:36               Narrative:    EXAMINATION:  XR ANKLE COMPLETE 3 VIEW LEFT    CLINICAL HISTORY:  Other acute postprocedural pain    COMPARISON:  Comparison was made to plain film examinations of the left ankle dating back to 08/04/2022.    FINDINGS:  There is minimal callus formation across the fractures in the distal aspect of the tibia.  There is an external fixator across the fractures of the tibia.  There is a mild amount of callus formation across a fracture in the distal portion of the fibula.  There is no dislocation.  There is no radiographic evidence of acute osteomyelitis.  There are surgical changes associated with a prior amputation of the left forefoot.

## 2022-09-28 NOTE — PROGRESS NOTES
[facility] [facility]  Orthopedics  Progress Note    Patient Name: Lavelle Ladd  MRN: [id number]  Admission Date: 9/13/2022  Hospital Length of Stay: 14 days  Attending Provider: Mando Delgado MD  Primary Care Provider: Primary Doctor No  Follow-up For: Procedure(s) (LRB):  ANGIOGRAM, LOWER EXTREMITY/left leg (N/A)    Post-Operative Day: 5 Days Post-Op  Subjective:     Principal Problem:Acute osteomyelitis of left calcaneus    Principal Orthopedic Problem:  Same as above    Interval History: Lavelle Ladd is a 67-year-old male postop day 11 status post left lower extremity external fixation removal and incision and drainage of left calcaneus for osteomyelitis.  Patient is resting comfortably in bed.  No new complaints at this time    Review of patient's allergies indicates:  No Known Allergies    Current Facility-Administered Medications   Medication    0.9%  NaCl infusion    0.9%  NaCl infusion    acetaminophen tablet 650 mg    albuterol-ipratropium 2.5 mg-0.5 mg/3 mL nebulizer solution 3 mL    aluminum-magnesium hydroxide-simethicone 200-200-20 mg/5 mL suspension 30 mL    carvediloL tablet 12.5 mg    cefTRIAXone (ROCEPHIN) 2 g/50 mL D5W IVPB    cholestyramine 4 gram packet 4 g    dextrose 10% bolus 125 mL    dextrose 10% bolus 250 mL    dicyclomine capsule 10 mg    epoetin dyana-epbx injection 4,880 Units    gabapentin capsule 100 mg    glucagon (human recombinant) injection 1 mg    glucose chewable tablet 16 g    glucose chewable tablet 24 g    heparin (porcine) injection 5,000 Units    HYDROcodone-acetaminophen  mg per tablet 1 tablet    insulin aspart U-100 pen 0-5 Units    Lactobacillus acidoph-L.bulgar 1 million cell tablet 4 tablet    linaCLOtide capsule 145 mcg    magnesium oxide tablet 400 mg    magnesium oxide tablet 800 mg    magnesium oxide tablet 800 mg    melatonin tablet 6 mg    metroNIDAZOLE tablet 500 mg    morphine injection 2 mg    naloxone 0.4 mg/mL injection 0.02 mg     "ondansetron disintegrating tablet 4 mg    ondansetron injection 4 mg    prochlorperazine injection Soln 5 mg    sertraline tablet 25 mg    simethicone chewable tablet 160 mg    sodium chloride 0.9% flush 10 mL    tacrolimus capsule 1 mg     Objective:     Vital Signs (Most Recent):  Temp: 97.6 °F (36.4 °C) (09/28/22 0730)  Pulse: 70 (09/28/22 0740)  Resp: 18 (09/28/22 0730)  BP: (!) 140/88 (09/28/22 0730)  SpO2: (!) 94 % (09/28/22 0730)   Vital Signs (24h Range):  Temp:  [97.6 °F (36.4 °C)-99.4 °F (37.4 °C)] 97.6 °F (36.4 °C)  Pulse:  [66-81] 70  Resp:  [17-20] 18  SpO2:  [90 %-95 %] 94 %  BP: ()/(53-88) 140/88     Weight: 96.1 kg (211 lb 13.8 oz)  Height: 5' 11" (180.3 cm)  Body mass index is 29.55 kg/m².      Intake/Output Summary (Last 24 hours) at 9/28/2022 0808  Last data filed at 9/28/2022 0611  Gross per 24 hour   Intake 720 ml   Output 250 ml   Net 470 ml       Ortho/SPM Exam  Left lower extremity:  Wound VAC is intact and working appropriately over the calcaneal I&D sites   Boot is intact and well fitting      GEN: Well developed, well nourished male. AAOX3. No acute distress.   Head: Normocephalic, atraumatic.   Eyes: MAD[place]  Neck: Trachea is midline, no adenopathy  Resp: Breathing unlabored.  Neuro: Motor function normal, Cranial nerves intact  Psych: Mood and affect appropriate.      Significant Labs: CBC:   Recent Labs   Lab 09/27/22  0545 09/28/22  0554   WBC 7.98 7.51   HGB 9.3* 8.7*   HCT 30.3* 27.6*    165     CMP:   Recent Labs   Lab 09/27/22  0545 09/28/22  0554   * 130*   K 5.2* 5.1    106   CO2 19* 17*   * 197*   BUN 37* 33*   CREATININE 2.6* 2.3*   CALCIUM 8.9 8.5*   PROT 5.9* 5.7*   ALBUMIN 2.0* 2.0*   BILITOT 0.3 0.3   ALKPHOS 164* 157*   AST 14 13   ALT 13 11   ANIONGAP 9 7*     POCT Glucose:   Recent Labs   Lab 09/27/22  1242 09/28/22  0030 09/28/22  0522   POCTGLUCOSE 151* 227* 191*     All pertinent labs within the past 24 hours have been " reviewed.    Significant Imaging: None    Assessment/Plan:     Active Diagnoses:    Diagnosis Date Noted POA    PRINCIPAL PROBLEM:  Acute osteomyelitis of left calcaneus [M86.172] 2022 Yes    Electrolyte abnormality [E87.8] 2022 No    Peripheral artery disease [I73.9] 2022 Yes    Infection of deep incisional surgical site after procedure [T81.42XA] 09/15/2022 Yes    Stage 3 chronic kidney disease [N18.30]  Yes    Long term current use of immunosuppressive drug [Z79.899] 2018 Not Applicable    Kidney transplant recipient [Z94.0] 2017 Not Applicable    H/O amputation [Z89.9] 2016 Yes    Chronic obstructive pulmonary disease [J44.9] 2016 Yes    Coronary artery disease of native artery of native heart with stable angina pectoris [I25.118] 2016 Yes    Type 2 diabetes mellitus with hyperglycemia, with long-term current use of insulin [E11.65, Z79.4]  Not Applicable     donor kidney transplant for DM 16 [Z94.0]  Not Applicable     Chronic    Essential hypertension [I10] 2015 Yes      Problems Resolved During this Admission:    Diagnosis Date Noted Date Resolved POA    Hyponatremia [E87.1] 2022 Yes     Assessment:   67-year-old male postop day 11 status post left lower extremity external fixation removal and incision and drainage of left calcaneus osteomyelitis     Plan:  NWB LLE  Patient should continue use of the fracture boot to the left lower extremity at all times except for dressing changes   PT/OT   Antibiotics: Per primary team   DVT prophylaxis:  Per primary team   Continue with wound care plan   Patient will undergo angiogram and vascular surgery has been re-consulted for evaluation of reperfusion vs amputation    Tam Mcdermott PA-C  Orthopedics  [facility] - Telemetry ([facility])

## 2022-09-28 NOTE — ASSESSMENT & PLAN NOTE
-Orthopedic Surgery following   -IV antibiotics  - Rocephin and Flagyl x 6 weeks  Proteus Mirabilis and B. faecis   -NWB LLE  DVT prophylaxis 24 hours after surgery   -Post op removal of the external fixator and debridement of osteomyelitis   -analgesia as needed   Per Ortho -  He just needs to be set up with home health for wound checks and may follow-up with ortho next week for a recheck ( pt to return to [facility])

## 2022-09-28 NOTE — PLAN OF CARE
Nutrition recommendations 9/28:  1. Recommend pt continue diabetic diet as medically appropriate.   2. Recommend pt recieves Arginaid packet BID to assist with wound healing  3. Collaborate with medical providers  Suraj Padillaitian

## 2022-09-28 NOTE — ASSESSMENT & PLAN NOTE
9/14/22: c/o of left ankle pain-controlled. Pt was scheduled for surgery, however, surgery was post-poned 2/2 hyponatremia -Na 126. WBC normal, afebrile. Blood cultures show NGTD. .3. cont IV Abx  09/15/22- removal of the external fixator and debridement of osteomyelitis planned for today- procedure postponed due to hyponatremia- Lasix given - Nephrology following   9/16/22 - procedure postponed due to hyperglycemia  9/17/22 - post op day 1 - ABX in progress  9/19/22 - post op day 3 - Cefepime continued, Zyvox stopped. NWB. Wound care consulted for wound vac changes  9/20/22 - bone cultures pending. Aerobic culture isolated proteus mirabils  9/21/22 - Proteus mirabilis and B. faecis - Unasyn  9/22/22 - 6 weeks of Rocephin and Flagyl per Dr. Veliz  9/24/22 Vascular surgery was unable to complete the angiogram yesterday d/t refusing it. Will await further recs by Vascular surgery. Continue treatment with IV ABX  9/25/22 Ortho is recommending a BKA per vascular surgery. Awaiting Vascular surgery recs. Continue current management with IV ABX.   9/26/22 The patient is alert and oriented today. He reports that he does not remember refusing the angiogram on 9/23/22. He reports that he is agreeable to the procedure. Vascular Surgery was reconsulted today. Ortho is following. Continue IV ABX. Wound vac is in place.   9/27/22 Vascular surgery reconsulted and plans to do angiogram on 9/29/22 to evaluate for reperfusion vs amputation. Ortho is following.   9/28/22 Vascular surgery plans for a LLE angiogram in AM. NPO after MN. Ortho following

## 2022-09-28 NOTE — PLAN OF CARE
Aox4. Able to verbalize needs & follow commands. Agitated when staff goes in pt's room.   POC reviewed with pt. Interventions implemented as appropriate.    VSS; SR on tele-monitor.  On RA.    PICC patent. Integrity maintained.    IVF infusing.  Receiving IV abx. No adverse reactions noted.  Wound vac to LLE. Walking boot in place. Numerous scabs scattered. No new skin issues.    No c/o pain. Has Noroco 10 mg & morphine 2 mg for breakthrough pain.  One episode of watery diarrhea. Incontinent episodes of b/b. Incontinence pad changed as needed.    CBG Q6H. SSI ordered.  Heparin SQ for VTE.  Bed bound. Activity ad kalia. Frequent position changes encouraged. Able to reposition in bed independently.  Educated on s/sx of pressure injury;  verbalized understanding.  NADN. Resting quietly in bed.   Free of falls. Hourly rounding complete.   All safety measures remain in place. SR up x2; bed low & locked. Bed alarm on and audible. Call light w/in reach.   Will continue to monitor throughout shift.

## 2022-09-28 NOTE — PLAN OF CARE
A230/A230 LINDSEYShayna Ladd is a 67 y.o.male admitted on 2022 for Acute osteomyelitis of left calcaneus   Code Status: DNR MRN: [id number]   Review of patient's allergies indicates:  No Known Allergies  Past Medical History:   Diagnosis Date    DINORAH (acute kidney injury) 2016    Arthritis     CAD in native artery 2019    CHF (congestive heart failure)     Chronic obstructive pulmonary disease 2016    Coronary artery disease involving native coronary artery of native heart without angina pectoris 2016    CRI (chronic renal insufficiency) 2019     donor kidney transplant for DM 16     Induction with Thymo x3 and IV solumedrol to total 875mg  Kidney Biopsy  2016: 16 glomeruli, ACR type 1 AVR type 2, significant microcirculatory changes, c4d negative, No DSA, 5 to10% fibrosis. Treated with thymo x8 2016- no rejection      Diastolic heart failure     Encounter for blood transfusion     ESRD on RRT since 10/2013 10/29/2013    Biopsy proven diabetic nephropathy and lymphoplasmacytic interstitial infiltrate not c/w with AIN (ddx sjogrens or assoc with tamm-horsefall protein extravasation)     GERD (gastroesophageal reflux disease)     History of hepatitis C, s/p successful Rx w/ SVR12 - 2017    Completed 12 weeks harvoni w/ SVR    Hyperlipidemia     Hypertension     PAD (peripheral artery disease) 2019    PIC line (peripherally inserted central catheter) flush     Prophylactic immunotherapy     Proteinuria     PVD (peripheral vascular disease) 2017    RLE BKA CT 16 Extensive atherosclerotic disease of the aorta and mesenteric arteries.     Renal hypertension     Type 2 diabetes mellitus with diabetic neuropathy, with long-term current use of insulin 2016    Vitamin B12 deficiency       PRN meds    sodium chloride 0.9%, , PRN  acetaminophen, 650 mg, Q4H PRN  albuterol-ipratropium, 3 mL, Q6H PRN  aluminum-magnesium hydroxide-simethicone, 30 mL,  QID PRN  dextrose 10%, 12.5 g, PRN  dextrose 10%, 25 g, PRN  glucagon (human recombinant), 1 mg, PRN  glucose, 16 g, PRN  glucose, 24 g, PRN  HYDROcodone-acetaminophen, 1 tablet, Q6H PRN  insulin aspart U-100, 0-5 Units, QID (AC + HS) PRN  magnesium oxide, 800 mg, PRN  magnesium oxide, 800 mg, PRN  melatonin, 6 mg, Nightly PRN  morphine, 2 mg, Q4H PRN  naloxone, 0.02 mg, PRN  ondansetron, 4 mg, Q6H PRN  prochlorperazine, 5 mg, Q6H PRN  simethicone, 2 tablet, TID PRN  sodium chloride 0.9%, 10 mL, Q8H PRN      9/29: NPO @ midnight for planned angiogram      Orientation: oriented x 4  Ronnell Coma Scale Score: 15  O2 Device (Oxygen Therapy): room air  Lead Monitored: Lead II Rhythm: sinus bradycardia    Cardiac/Telemetry Box Number: [id number]  VTE Required Core Measure: Pharmacological prophylaxis initiated/maintained Last Bowel Movement: 09/28/22  Diet diabetic [facility]; 2000 Calorie  Diet NPO  Voiding Characteristics: incontinence  Matthew Score: 15  Fall Risk Score: 24  Accucheck [x]   Freq? q6hr     Lines/Drains/Airways       Peripherally Inserted Central Catheter Line  Duration             PICC Double Lumen 09/18/22 1738 right basilic 10 days              Drain  Duration                  Hemodialysis AV Fistula 09/14/22 0717 Left upper arm 14 days
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
## 2022-09-28 NOTE — PROGRESS NOTES
"[facility] - Telemetry ([facility])  Adult Nutrition  Progress Note    SUMMARY       Recommendations    Recommendation/Intervention:   1. Recommend pt continue diabetic diet as medically appropriate.   2. Recommend pt recieves Arginaid packet BID to assist with wound healing.    Goals:   1.Pt will continue to consume % PO intake  2.Pt will consume % Arginaid packet supplement for wound healing by RD follow up  Nutrition Goal Status: continues  Communication of RD Recs: other (comment) (Plan of care: Sticky note)    Assessment and Plan  Nutrition Problem  Increased protein needs     Related to (etiology):   Increased demand for nutrition    Signs and Symptoms (as evidenced by):   Wound     Interventions/Recommendations (treatment strategy):  1. Recommend pt continue diabetic diet as medically appropriate.   2. Recommend pt recieves Arginaid packet BID to assist with wound healing  3. Collaborate with medical providers     Nutrition Diagnosis Status:   continues    Reason for Assessment    Reason For Assessment: consult, RD follow-up, length of stay  Diagnosis: infection/sepsis  Relevant Medical History: HTN, T2DM, CAD, COPD, H/O amputation, CKD3  Interdisciplinary Rounds: did not attend  General Information Comments: 9/23/22: RD assess pt for consult of needing oral supplument, LOS and RD follow up. Pt states that he does not feel well and this has cause him not to have a appetite. when the pt was asked how much food he was consuming he replied "I dont know" the RD estimates this amount to be 0-25%. The pt states that his appetite has been like this for the last three though four days. The pt says that he has experiencing v/n but would not specifi how freakqently or how often. The pt complains that his wound is bothering him. Based on his PMH, I believe Suplena with carb steady is a good supplument for this pt.  Nutrition Discharge Planning: cardiac, renal diet    Follow up  9/28: Per LPN note 9/27 pt agitated " "every time staff goes in pt room, spoke to RN, confirmed pt agitation. RN stated pt is tolerating diet and supplements with no N/V/D reported, confirmed % PO intake, wound still needs to heal. LBM  9/26. Labs and weight reviewed. RD will continue to monitor for tolerance and wound healing.     9/26: Visited pt twice at bedside, pt sleeping both times. Spoke to RN, confirmed PO intake at 100%. Reported pt has not been receiving Arginaid. Brought RN two packets of Arginaid personally. Reviewed labs, noted high potassium, recommended packets versus supplement drink d/t lower potassium in packets. LBM 9/25. Labs and weight reviewed. RD will continue to monitor.     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)  Total Amputation %: 5.9  Amputation Ideal Body Weight (IBW), Male (lb): 166.1 lb  Amputee BMI (kg/m2): 32.21 kg/m2  Additional Documentation: Amputations Present (Ideal Body Weight)    Lab/Procedures/Meds    Pertinent Labs Reviewed: reviewed  Pertinent Labs Comments: Na 128, Cr 1.6, GFR 46, Glu 266, Mg 1.4, Alb 1.8, , ALT 94, A1c 7.4% on 8/4/22  Pertinent Medications Reviewed: reviewed  Pertinent Medications Comments: atorvastatin, carvedilol, furosemide, gabapentin, insulin, magnesium oxide, tacrolimus    Physical Findings/Assessment    Wt Readings from Last 15 Encounters:   09/28/22 96.1 kg (211 lb 13.8 oz)   09/02/22 102 kg (224 " lb 13.9 oz)   08/08/22 114.7 kg (252 lb 13.9 oz)   08/15/21 102.5 kg (226 lb)   02/11/21 101.6 kg (224 lb)   10/07/20 101.6 kg (224 lb)   08/12/20 101.6 kg (224 lb)   07/08/20 104.3 kg (230 lb)   07/07/20 104.3 kg (230 lb)   07/06/20 108.1 kg (238 lb 6.4 oz)   06/30/20 104.5 kg (230 lb 6.1 oz)   06/24/20 105.4 kg (232 lb 5.8 oz)   03/04/20 104.6 kg (230 lb 9.6 oz)   02/21/20 103.6 kg (228 lb 6.3 oz)   02/14/20 100.7 kg (222 lb)   ]    Estimated/Assessed Needs    Weight Used For Calorie Calculations: 75.5 kg (166 lb 7.2 oz)  Energy Calorie Requirements (kcal): 1850 (MSJ, AF 1.2)  Energy Need Method: Lyman-St Jeor  Protein Requirements: 75-94  Weight Used For Protein Calculations: 75.5 kg (166 lb 7.2 oz)  Fluid Requirements (mL): 1850  Estimated Fluid Requirement Method: RDA Method  RDA Method (mL): 1850  CHO Requirement: 231      Nutrition Prescription Ordered    Current Diet Order: Diabetic   Current supplements ordered: Arginaid packets BID       Boost Glucose Control TID    Evaluation of Received Nutrient/Fluid Intake    % Kcal Needs: 0  % Protein Needs: 0  I/O: 0.5 L  Energy Calories Required: not meeting needs  Protein Required: not meeting needs  Fluid Required: not meeting needs  Comments: LBM 9/15  % Intake of Estimated Energy Needs: 75 - 100 %  % Meal Intake: 75 - 100 %    Nutrition Risk    Level of Risk/Frequency of Follow-up: high     Monitor and Evaluation    Food and Nutrient Intake: energy intake, food and beverage intake  Food and Nutrient Adminstration: diet order  Knowledge/Beliefs/Attitudes: food and nutrition knowledge/skill, beliefs and attitudes  Physical Activity and Function: nutrition-related ADLs and IADLs  Anthropometric Measurements: weight, weight change, body mass index  Biochemical Data, Medical Tests and Procedures: electrolyte and renal panel, gastrointestinal profile, glucose/endocrine profile, inflammatory profile, lipid profile  Nutrition-Focused Physical Findings: overall  appearance     Nutrition Follow-Up    RD Follow-up?: Yes  Sherri Heard, Dietitian

## 2022-09-28 NOTE — ASSESSMENT & PLAN NOTE
9/24/22 Today he was noted to have a K+ 2.8 and Mg 1.4, Will replete.   9/25/22 K+ is improved today. K+ 5.0  9/26/22 K+ 5.6 today, will give a dose of lokelma.   9/27/22 The patients K+ improved to 5.2 after lokelma. CMP in am   9/28/22 K+ improved after lokelma, 5.1 today.

## 2022-09-28 NOTE — ASSESSMENT & PLAN NOTE
Patient's FSGs are uncontrolled due to hyperglycemia on current medication regimen.  Last A1c reviewed-   Lab Results   Component Value Date    HGBA1C 7.4 (H) 08/04/2022     Most recent fingerstick glucose reviewed-   Recent Labs   Lab 09/28/22  0030 09/28/22  0522 09/28/22  1119   POCTGLUCOSE 227* 191* 265*     Current correctional scale  Low  Maintain anti-hyperglycemic dose as follows-   Antihyperglycemics (From admission, onward)    Start     Stop Route Frequency Ordered    09/26/22 0102  insulin aspart U-100 pen 0-5 Units         -- SubQ Before meals & nightly PRN 09/26/22 0102        Levemir added- changed to bid dosing and moderate sliding scale - dose increased from 16 units to 30 Units  Hold Oral hypoglycemics while patient is in the hospital.  9/24/22 The patient had a rapid response called over night. His blood glucose dropped to <20. He was given an amp of D50 and he returned to baseline.

## 2022-09-29 NOTE — ASSESSMENT & PLAN NOTE
Patient's FSGs are uncontrolled due to hyperglycemia on current medication regimen.  Last A1c reviewed-   Lab Results   Component Value Date    HGBA1C 7.4 (H) 08/04/2022     Most recent fingerstick glucose reviewed-   Recent Labs   Lab 09/28/22  1740 09/29/22  0016 09/29/22  0551   POCTGLUCOSE 191* 188* 155*     Current correctional scale  Low  Maintain anti-hyperglycemic dose as follows-   Antihyperglycemics (From admission, onward)    Start     Stop Route Frequency Ordered    09/26/22 0102  insulin aspart U-100 pen 0-5 Units         -- SubQ Before meals & nightly PRN 09/26/22 0102        Levemir added- changed to bid dosing and moderate sliding scale - dose increased from 16 units to 30 Units  Hold Oral hypoglycemics while patient is in the hospital.  9/24/22 The patient had a rapid response called over night. His blood glucose dropped to <20. He was given an amp of D50 and he returned to baseline.

## 2022-09-29 NOTE — ASSESSMENT & PLAN NOTE
Hold cellcept due to active infection  Cont tacrolimus  Check tac level    Nephrology following  Slight bump in creatinine to 1.7>> 2.2>>> 2.0>>> 1.6  On Cellcept in progress  Nephrology stopped lasix today due to bump in creatinine. Gentle fluid bolus given  9/29/22 Renal function improved with IVF's

## 2022-09-29 NOTE — PLAN OF CARE
Right groin dressing remains c/d/I and site wnl at time of transfer.  Transferred to [facility], via hospital bed; pt sleeps unless awakened.   Report given at bedside; no further questions at this time.

## 2022-09-29 NOTE — DISCHARGE INSTRUCTIONS
"                                      Post-op Aortogram    1. DIET: It is advisable for you to follow a diet that limits the intake of salt, sugar, saturated fats and cholesterol.     2. FOR THE NEXT 24 HOURS:   For the next 8 hours, you should be watched by a responsible adult. This person should make sure your condition is not getting worse.   Don't drink any alcohol for the next 24 hours.  Don't drive, operate dangerous machinery, or make important business or personal decisions during the next 24 hours.  Your healthcare provider may tell you not to take any medicine by mouth for pain or sleep in the next 4 hours. These medicines may react with the medicines you were given in the hospital. This could cause a much stronger response than usual.     3. ACTIVITY:                                Day of discharge:             NO vigorous activity, lifting or straining                                                   Day after discharge:         Avoid heavy lifting (up to 10 lbs)                                                                        The day after discharge you may shower, but avoid tub baths for 5 days                                                                         Wash site gently with soap and water        NO powder or lotion to your procedure site.                 Before you shower, remove dressing, apply bandaid if desired                                                                                                                                                                            2nd day after discharge:  Resume normal activities                                                                         Exercise program as instructed    4. WOUND CARE: It is not unusual to have a small amount of bruising appear in the groin area. It is also common to have a tender "knot" develop beneath the skin at the puncture site in the groin. This is scar tissue only and is not a cause for concern " "or alarm. This tender knot may take several weeks to fully resolve. The bruise will usually spread over several days. If the lump gets bigger, call you doctor immediately.    5. DISCOMFORT: For general discomfort at the puncture site, you may take 1 or 2 Acetaminophen (Tylenol) tablets every 4 hours as needed. (Do not take more than 4000 mg a day)               6. CALL YOUR HEALTHCARE PROVIDER IF YOU START TO HAVE THE FOLLOWING SYMPTOMS:        1. Problems with the affected leg: Pain, discomfort, loss of warmth, numbness or tingling                                                                                                                            2. Problems at the groin site: Bleeding, pain that is sudden/sharp/persistent,                   swelling at site or a change in "lump" size, increased redness or drainage at                     puncture site                                                               3. High fever (101 degrees or higher)       4.  Drowsiness that doesn't get better       5. Weakness or dizziness that doesn't get better            6. Repeated vomiting    7. GO TO  THE EMERGENCY ROOM OR CALL 911 IF YOU HAVE: Chest pains or discomforts not relieved with 3 nitroglycerin doses (sublingual tablets or spray), numbness or severe pain or if your foot or leg becomes cold or discolored or uncontrolled bleeding from site (apply direct pressure above site).   "
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
## 2022-09-29 NOTE — ASSESSMENT & PLAN NOTE
-Orthopedic Surgery following   -IV antibiotics  - Rocephin and Flagyl x 6 weeks  Proteus Mirabilis and B. faecis   -NWB LLE  DVT prophylaxis 24 hours after surgery   -Post op removal of the external fixator and debridement of osteomyelitis   -analgesia as needed   Per Ortho -  He just needs to be set up with home health for wound checks and may follow-up with ortho next week for a recheck ( pt to return to [facility])

## 2022-09-29 NOTE — ASSESSMENT & PLAN NOTE
9/14/22: c/o of left ankle pain-controlled. Pt was scheduled for surgery, however, surgery was post-poned 2/2 hyponatremia -Na 126. WBC normal, afebrile. Blood cultures show NGTD. .3. cont IV Abx  09/15/22- removal of the external fixator and debridement of osteomyelitis planned for today- procedure postponed due to hyponatremia- Lasix given - Nephrology following   9/16/22 - procedure postponed due to hyperglycemia  9/17/22 - post op day 1 - ABX in progress  9/19/22 - post op day 3 - Cefepime continued, Zyvox stopped. NWB. Wound care consulted for wound vac changes  9/20/22 - bone cultures pending. Aerobic culture isolated proteus mirabils  9/21/22 - Proteus mirabilis and B. faecis - Unasyn  9/22/22 - 6 weeks of Rocephin and Flagyl per Dr. Veliz  9/24/22 Vascular surgery was unable to complete the angiogram yesterday d/t refusing it. Will await further recs by Vascular surgery. Continue treatment with IV ABX  9/25/22 Ortho is recommending a BKA per vascular surgery. Awaiting Vascular surgery recs. Continue current management with IV ABX.   9/26/22 The patient is alert and oriented today. He reports that he does not remember refusing the angiogram on 9/23/22. He reports that he is agreeable to the procedure. Vascular Surgery was reconsulted today. Ortho is following. Continue IV ABX. Wound vac is in place.   9/27/22 Vascular surgery reconsulted and plans to do angiogram on 9/29/22 to evaluate for reperfusion vs amputation. Ortho is following.   9/28/22 Vascular surgery plans for a LLE angiogram in AM. NPO after MN. Ortho following   9/29/22 The patients renal function improved with IVF's. Plans for angiogram of LLE today with Vascular surgery to determine if revascularization is possible or if the patient will need a BKA. Will consult PT/OT for recs to assist with placement.

## 2022-09-29 NOTE — ASSESSMENT & PLAN NOTE
Arterial studies of left leg indicates significant stenosis  Vascular consult for possible angiogram >>> 9/23/22  NPO after MN  9/29/22 Plan for angiogram today

## 2022-09-29 NOTE — INTERVAL H&P NOTE
The patient has been examined and the H&P has been reviewed:    I concur with the findings and no changes have occurred since H&P was written.    Anesthesia/Surgery risks, benefits and alternative options discussed and understood by patient/family.          Active Hospital Problems    Diagnosis  POA    *Acute osteomyelitis of left calcaneus [M86.172]  Yes    Electrolyte abnormality [E87.8]  No    Peripheral artery disease [I73.9]  Yes    Infection of deep incisional surgical site after procedure [T81.42XA]  Yes    Stage 3 chronic kidney disease [N18.30]  Yes    Long term current use of immunosuppressive drug [Z79.899]  Not Applicable    Kidney transplant recipient [Z94.0]  Not Applicable    H/O amputation [Z89.9]  Yes    Chronic obstructive pulmonary disease [J44.9]  Yes    Coronary artery disease of native artery of native heart with stable angina pectoris [I25.118]  Yes    Type 2 diabetes mellitus with hyperglycemia, with long-term current use of insulin [E11.65, Z79.4]  Not Applicable     donor kidney transplant for DM 16 [Z94.0]  Not Applicable     Chronic     Induction with Thymo x3 and IV solumedrol to total 875mg    Kidney Biopsy   2016: 16 glomeruli, ACR type 1 AVR type 2, significant microcirculatory changes, c4d negative, No DSA, 5 to10% fibrosis. Treated with thymo x8 (5 full, 3 half doses)  2016: insufficient sample since it had no glomeruli; C4d was negative in peritubular capillaries but insufficient tissue to comment on presence of rejection  8/15/16: 25 glomeruli, moderate microcirculatory inflammation, positive C4d (20-30%), interstitial infiltrate with tubulitis but <5% thus not meeting diagnostic criteria for ACR but suspicious for ACR, no AVR, CNI toxicity, ~10% interstitial fibrosis, 7 foci of calcium phos crystals. Rx SM pulsex3 in BR -16 Endothelial cell crossmatch against target cells EC1 and EC2 negative, no DILIA antibodies detected. AT1R also negative. At  present time no change in management. (resulted 8/29)        Essential hypertension [I10]  Yes      Resolved Hospital Problems    Diagnosis Date Resolved POA    Hyponatremia [E87.1] 09/22/2022 Yes

## 2022-09-29 NOTE — OP NOTE
Date of service 09/29/2022  Procedure:  Ultrasound-guided right common femoral artery puncture, aortogram, left lower extremity Angiogram runoff    Attending surgeon:  Dr. Donal Mcdonald MD  Anesthesia: Conscious sedation with local  Complications:  None  EBL: Minimal  Medications given: None  Specimens: None    Indication:  67-year-old male with a history of left lower extremity orthopedic fractures had external fixation placed and developed a osteomyelitis and wound infection around the ex fix which has been removed.  Patient also has a previous transmetatarsal amputation now with multiple nonhealing lower extremity wounds on the left side.  Found to have abnormal vascular studies and presents now for the above procedure    Procedure: After discussion with the patient regarding risks benefits potential complications the patient was brought to the special procedure room placed in supine position.  After adequate conscious sedation and monitoring prepped and draped the patient's bilateral groins in the standard surgical fashion.  At this point under ultrasound guidance I cannulated the right common femoral artery using a micropuncture needle placing a 5 Azeri introducer sheath.  I passed a 0.035 glidewire into the aorta and then advanced a pigtail catheter over Glidewire and positioned at the aortic bifurcation.  I then shot aortoiliac angiogram which revealed generalized mild atherosclerotic disease but no significant or severe stenosis.  I then selectively cannulated the contralateral iliac limb positioning the catheter in left common femoral artery.  I then shot a left lower extremity angiogram runoff.      Radiologic findings: Patient had a widely patent common femoral and deep femoral artery.  The superficial femoral artery had several areas of mild-to-moderate calcific atherosclerotic disease but no significant or severe stenosis.  There was continuous inline flow through the superficial femoral artery  continuous into the behind and below-knee popliteal artery.  Below-the-knee however the tibioperoneal trunk appeared to have severe disease there was a patent origin of the anterior tibial artery which occluded shortly after its takeoff and unfortunately there was no reconstitution of the anterior tibial artery distally or into the dorsalis pedis.  The peroneal artery occluded shortly after its origin.  The the posterior tibial artery was chronically occluded there was numerous amount of collateral vessels in the tibial distribution but no named vessel.  There was no named distal target vessel identified at the level of the ankle or in the foot.    At this point no further interventions were undertaken all catheters and wires removed from the groin direct manual pressure was held proximally 30 minutes patient tolerated procedure and transferred recovery room stable signs.      Recommendations:  Patient was severe tibial vascular disease with no reconstructible vessel were identified, no suitable distal vessels for target revascularization with surgical bypass were identified either.  At this point would recommend continued antibiotics and wound care and should the patients wounds continued to deteriorate would recommend amputation and would defer this to orthopedics

## 2022-09-29 NOTE — SUBJECTIVE & OBJECTIVE
Interval History: No acute events overnight. The patients renal function improved with IVF's. Plans for angiogram of LLE today with Vascular surgery to determine if revascularization is possible or if the patient will need a BKA. Will consult PT/OT for recs to assist with placement.     Review of Systems   Constitutional:  Positive for activity change and fatigue. Negative for appetite change, chills, fever and unexpected weight change.   HENT:  Negative for congestion, facial swelling, rhinorrhea, sinus pressure, sneezing and sore throat.    Eyes:  Negative for discharge, redness and visual disturbance.   Respiratory:  Negative for apnea, cough, chest tightness, shortness of breath, wheezing and stridor.    Cardiovascular:  Negative for chest pain, palpitations and leg swelling.   Gastrointestinal:  Positive for abdominal pain and nausea. Negative for abdominal distention, anal bleeding, blood in stool, constipation, diarrhea and vomiting.   Genitourinary:  Negative for difficulty urinating, dysuria, frequency, hematuria and penile discharge.   Musculoskeletal:  Positive for gait problem. Negative for arthralgias, back pain, joint swelling, myalgias, neck pain and neck stiffness.   Skin:  Positive for wound. Negative for color change and pallor.   Neurological:  Positive for weakness. Negative for dizziness, tremors, seizures, syncope, facial asymmetry, speech difficulty, light-headedness, numbness and headaches.   Psychiatric/Behavioral:  Negative for behavioral problems, confusion, hallucinations, sleep disturbance and suicidal ideas. The patient is not nervous/anxious.    All other systems reviewed and are negative.  Objective:     Vital Signs (Most Recent):  Temp: 98.4 °F (36.9 °C) (09/29/22 1115)  Pulse: 73 (09/29/22 1115)  Resp: 20 (09/29/22 1115)  BP: 136/61 (09/29/22 1115)  SpO2: (!) 94 % (09/29/22 1115)   Vital Signs (24h Range):  Temp:  [98 °F (36.7 °C)-100.2 °F (37.9 °C)] 98.4 °F (36.9 °C)  Pulse:   [53-81] 73  Resp:  [16-20] 20  SpO2:  [94 %-95 %] 94 %  BP: (109-136)/(52-63) 136/61     Weight: 98 kg (216 lb 0.8 oz)  Body mass index is 30.13 kg/m².    Intake/Output Summary (Last 24 hours) at 9/29/2022 1124  Last data filed at 9/29/2022 1000  Gross per 24 hour   Intake 240 ml   Output 475 ml   Net -235 ml      Physical Exam  Vitals and nursing note reviewed.   Constitutional:       General: He is not in acute distress.     Appearance: He is well-developed. He is ill-appearing. He is not toxic-appearing.   HENT:      Head: Normocephalic and atraumatic.   Eyes:      Conjunctiva/sclera: Conjunctivae normal.      Pupils: Pupils are equal, round, and reactive to light.   Cardiovascular:      Rate and Rhythm: Normal rate and regular rhythm.      Heart sounds: Normal heart sounds. No murmur heard.  Pulmonary:      Effort: Pulmonary effort is normal. No respiratory distress.      Breath sounds: Normal breath sounds.   Abdominal:      General: Bowel sounds are normal. There is no distension.      Palpations: Abdomen is soft.      Tenderness: There is abdominal tenderness.   Musculoskeletal:         General: Deformity present. No tenderness.      Cervical back: Normal range of motion and neck supple.      Left foot: Deformity present.      Comments: Left tma, wound vac in place      Left Lower Extremity: Left leg is amputated below knee.   Skin:     General: Skin is warm and dry.      Findings: Lesion present. No erythema or rash.   Neurological:      Mental Status: He is alert and oriented to person, place, and time.      Motor: Weakness present.   Psychiatric:         Behavior: Behavior normal.       Significant Labs: All pertinent labs within the past 24 hours have been reviewed.    Significant Imaging:   Imaging Results              X-Ray Ankle Complete Left (Final result)  Result time 09/13/22 15:36:47      Final result by ANA CRISTINA Joseph Sr., MD (09/13/22 15:36:47)                   Impression:      1. There is no  radiographic evidence of acute osteomyelitis.  2. There is minimal callus formation across the fractures in the distal aspect of the tibia. There is an external fixator across the fractures of the tibia.  3. There is a mild amount of callus formation across a fracture in the distal portion of the fibula.      Electronically signed by: Porter Joseph MD  Date:    09/13/2022  Time:    15:36               Narrative:    EXAMINATION:  XR ANKLE COMPLETE 3 VIEW LEFT    CLINICAL HISTORY:  Other acute postprocedural pain    COMPARISON:  Comparison was made to plain film examinations of the left ankle dating back to 08/04/2022.    FINDINGS:  There is minimal callus formation across the fractures in the distal aspect of the tibia.  There is an external fixator across the fractures of the tibia.  There is a mild amount of callus formation across a fracture in the distal portion of the fibula.  There is no dislocation.  There is no radiographic evidence of acute osteomyelitis.  There are surgical changes associated with a prior amputation of the left forefoot.

## 2022-09-29 NOTE — ASSESSMENT & PLAN NOTE
9/24/22 Today he was noted to have a K+ 2.8 and Mg 1.4, Will replete.   9/25/22 K+ is improved today. K+ 5.0  9/26/22 K+ 5.6 today, will give a dose of lokelma.   9/27/22 The patients K+ improved to 5.2 after lokelma. CMP in am   9/28/22 K+ improved after lokelma, 5.1 today.   9/29/22 K+ 3.9 today, will monitor.

## 2022-09-30 NOTE — ASSESSMENT & PLAN NOTE
Appears stable   -Cr 1.8> 1.6> 1.7  Avoid nephrotoxic medications   Monitor   -Nephrology following   Stable and Nephrology signed off on 9/22/22

## 2022-09-30 NOTE — SUBJECTIVE & OBJECTIVE
Interval History: IV fluids d/c, renal function improved, ADAM submitted for authorization.     Review of Systems   Constitutional:  Positive for activity change and fatigue. Negative for appetite change, chills, diaphoresis, fever and unexpected weight change.   HENT:  Negative for congestion, hearing loss, nosebleeds, postnasal drip, rhinorrhea and trouble swallowing.    Eyes:  Negative for discharge and visual disturbance.   Respiratory:  Negative for cough, chest tightness and shortness of breath.    Cardiovascular:  Negative for chest pain, palpitations and leg swelling.   Gastrointestinal:  Positive for abdominal distention. Negative for abdominal pain, constipation, diarrhea, nausea and vomiting.   Endocrine: Negative for cold intolerance and heat intolerance.   Genitourinary:  Negative for difficulty urinating, dysuria, frequency and hematuria.   Musculoskeletal:  Positive for back pain and gait problem. Negative for arthralgias and myalgias.   Skin:  Positive for wound.   Neurological:  Negative for dizziness, weakness, light-headedness and headaches.   Hematological:  Negative for adenopathy. Does not bruise/bleed easily.   Psychiatric/Behavioral:  Negative for agitation, behavioral problems and confusion. The patient is not nervous/anxious.    Objective:     Vital Signs (Most Recent):  Temp: 98.4 °F (36.9 °C) (09/30/22 1124)  Pulse: 75 (09/30/22 1124)  Resp: 17 (09/30/22 1124)  BP: (!) 112/56 (09/30/22 1124)  SpO2: (!) 93 % (09/30/22 1124)   Vital Signs (24h Range):  Temp:  [97 °F (36.1 °C)-99.2 °F (37.3 °C)] 98.4 °F (36.9 °C)  Pulse:  [72-79] 75  Resp:  [14-20] 17  SpO2:  [93 %-99 %] 93 %  BP: ()/(55-73) 112/56     Weight: 98.9 kg (218 lb 0.6 oz)  Body mass index is 30.41 kg/m².    Intake/Output Summary (Last 24 hours) at 9/30/2022 1140  Last data filed at 9/30/2022 0900  Gross per 24 hour   Intake 240 ml   Output 1695 ml   Net -1455 ml      Physical Exam  Vitals and nursing note reviewed.    Constitutional:       General: He is not in acute distress.     Appearance: He is well-developed. He is ill-appearing. He is not diaphoretic.   HENT:      Head: Normocephalic and atraumatic.      Right Ear: Hearing and external ear normal.      Left Ear: Hearing and external ear normal.      Nose: Nose normal. No mucosal edema or rhinorrhea.      Mouth/Throat:      Pharynx: Uvula midline.   Eyes:      General:         Right eye: No discharge.         Left eye: No discharge.      Conjunctiva/sclera: Conjunctivae normal.      Right eye: No chemosis.     Left eye: No chemosis.     Pupils: Pupils are equal, round, and reactive to light.   Neck:      Thyroid: No thyroid mass or thyromegaly.      Trachea: Trachea normal.   Cardiovascular:      Rate and Rhythm: Normal rate and regular rhythm.      Heart sounds: Normal heart sounds. No murmur heard.  Pulmonary:      Effort: Pulmonary effort is normal. No respiratory distress.      Breath sounds: Normal breath sounds. No decreased breath sounds or wheezing.   Abdominal:      General: Bowel sounds are decreased. There is distension.      Palpations: Abdomen is soft.      Tenderness: There is no abdominal tenderness.   Musculoskeletal:         General: Normal range of motion.      Cervical back: Normal range of motion and neck supple.      Right Lower Extremity: Right leg is amputated below knee.   Feet:      Comments: Left foot, wound vac with boot  Lymphadenopathy:      Cervical: No cervical adenopathy.      Upper Body:      Right upper body: No supraclavicular adenopathy.      Left upper body: No supraclavicular adenopathy.   Skin:     General: Skin is warm and dry.      Capillary Refill: Capillary refill takes less than 2 seconds.      Findings: No rash.   Neurological:      Mental Status: He is alert and oriented to person, place, and time.   Psychiatric:         Mood and Affect: Mood is not anxious.         Speech: Speech normal.         Behavior: Behavior normal.          Thought Content: Thought content normal.         Judgment: Judgment normal.       Significant Labs: All pertinent labs within the past 24 hours have been reviewed.  CBC:   Recent Labs   Lab 09/29/22  0958 09/30/22  0513   WBC 5.94 4.57  4.55   HGB 8.2* 8.8*  8.7*   HCT 27.0* 28.5*  28.9*   * 139*  144*     CMP:   Recent Labs   Lab 09/29/22  0635 09/30/22  0513    132*   K 3.9 4.9   * 109   CO2 12* 13*   * 126*   BUN 22 17   CREATININE 1.4 1.3   CALCIUM 7.0* 8.1*   PROT 4.5* 4.8*   ALBUMIN 1.6* 1.9*   BILITOT 0.3 0.3   ALKPHOS 112 140*   AST 14 16   ALT 6* 9*   ANIONGAP 7* 10       Significant Imaging: I have reviewed all pertinent imaging results/findings within the past 24 hours.

## 2022-09-30 NOTE — PLAN OF CARE
09/30/22 1608   Discharge Reassessment   Assessment Type Discharge Planning Reassessment   Did the patient's condition or plan change since previous assessment? No   Discharge Plan A Skilled Nursing Facility   DME Needed Upon Discharge  none   Discharge Barriers Identified Does not adhere to care plan   Why the patient remains in the hospital Placement issues   Post-Acute Status   Post-Acute Authorization Placement   Post-Acute Placement Status Pending payor review/awaiting authorization (if required)

## 2022-09-30 NOTE — PROGRESS NOTES
Patient in recovery after angiogram. He is awake but drowsy. Wound vac currently in place to medial and lateral right foot. at -125mmHg low intensity continuous suction. Removed. lateral foot wound measures [id number]. Medial foot wound measures [id number]. Both with moist tan and red granulating wound beds and antibiotic beads and suture noted in wound bed. Wounds do appear to connect as one side wouldn't hold suction while the other was open. Cleansed with saline and gauze and patted dry. Periwound painted with cavilon. Wounds each filled with 1 piece of black foam and secured with drape. Exrta drape and foam used to bridge to dorsal foot together and for better trac pad placement. Connected to I wound vac ulta at -125mmHg. Will plan for vac change Wednesday or thursday. Patient tolerated well.

## 2022-09-30 NOTE — ASSESSMENT & PLAN NOTE
Patient's FSGs are uncontrolled due to hyperglycemia on current medication regimen.  Last A1c reviewed-   Lab Results   Component Value Date    HGBA1C 7.4 (H) 08/04/2022     Most recent fingerstick glucose reviewed-   Recent Labs   Lab 09/29/22  1843 09/29/22 2038 09/30/22  0442 09/30/22  1119   POCTGLUCOSE 149* 156* 128* 208*     Current correctional scale  Low  Maintain anti-hyperglycemic dose as follows-   Antihyperglycemics (From admission, onward)    Start     Stop Route Frequency Ordered    09/26/22 0102  insulin aspart U-100 pen 0-5 Units         -- SubQ Before meals & nightly PRN 09/26/22 0102        Levemir added- changed to bid dosing and moderate sliding scale - dose increased from 16 units to 30 Units  Hold Oral hypoglycemics while patient is in the hospital.  9/24/22 The patient had a rapid response called over night. His blood glucose dropped to <20. He was given an amp of D50 and he returned to baseline.
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
## 2022-09-30 NOTE — PLAN OF CARE
Patient ready for transfer back to [facility].  Updated information sent to [place] via [employer] and requested to submit for authorization.  Notified Summer with [employer] [place].       09/30/22 0853   Post-Acute Status   Post-Acute Authorization Placement   Post-Acute Placement Status Pending payor review/awaiting authorization (if required)   Discharge Plan   Discharge Plan A Skilled Nursing Facility

## 2022-09-30 NOTE — ASSESSMENT & PLAN NOTE
Hold cellcept due to active infection  Cont tacrolimus  Check tac level    Nephrology following  Slight bump in creatinine to 1.7>> 2.2>>> 2.0>>> 1.6  On Cellcept in progress  Nephrology stopped lasix today due to bump in creatinine. Gentle fluid bolus given  9/29/22 Renal function improved with IVF's  9/30: Stop IVF, improved

## 2022-09-30 NOTE — PLAN OF CARE
Problem: Skin Injury Risk Increased  Goal: Skin Health and Integrity  Intervention: Optimize Skin Protection  Flowsheets (Taken 9/30/2022 0321)  Pressure Reduction Techniques: frequent weight shift encouraged  Pressure Reduction Devices: feet on footrest/footstool  Skin Protection:   tubing/devices free from skin contact   pectin skin barriers applied  Head of Bed (HOB) Positioning: HOB elevated     Problem: Skin Injury Risk Increased  Goal: Skin Health and Integrity  Outcome: Ongoing, Progressing  Intervention: Optimize Skin Protection  Flowsheets (Taken 9/30/2022 0321)  Pressure Reduction Techniques: frequent weight shift encouraged  Pressure Reduction Devices: feet on footrest/footstool  Skin Protection:   tubing/devices free from skin contact   pectin skin barriers applied  Head of Bed (HOB) Positioning: HOB elevated

## 2022-09-30 NOTE — ASSESSMENT & PLAN NOTE
Creatinine improved  Awaiting angio- completed  Continue fluids- D/C 9/30, renal function improved, adequate PO intake  Continue tacrolimus

## 2022-09-30 NOTE — ASSESSMENT & PLAN NOTE
-Orthopedic Surgery following   -IV antibiotics  - Rocephin and Flagyl x 6 weeks  Proteus Mirabilis and B. faecis   -NWB LLE  DVT prophylaxis 24 hours after surgery   -Post op removal of the external fixator and debridement of osteomyelitis   -analgesia as needed   Per Ortho -  He just needs to be set up with home health for wound checks and may follow-up with ortho next week for a recheck ( pt to return to [facility])

## 2022-09-30 NOTE — ASSESSMENT & PLAN NOTE
Arterial studies of left leg indicates significant stenosis  Vascular consult for possible angiogram >>> 9/23/22  NPO after MN  9/29/22 Plan for angiogram today- completed

## 2022-10-01 NOTE — SUBJECTIVE & OBJECTIVE
Interval History: patient febrile overnight, labs, CXR, U/A, and blood cultures ordered. Patient refused nursing assessment, AM meds, finally took tylenol to break fever.    Review of Systems   Constitutional:  Positive for activity change and fatigue. Negative for appetite change, chills, diaphoresis, fever and unexpected weight change.   HENT:  Negative for congestion, hearing loss, nosebleeds, postnasal drip, rhinorrhea and trouble swallowing.    Eyes:  Negative for discharge and visual disturbance.   Respiratory:  Negative for cough, chest tightness and shortness of breath.    Cardiovascular:  Negative for chest pain, palpitations and leg swelling.   Gastrointestinal:  Positive for abdominal distention. Negative for abdominal pain, constipation, diarrhea, nausea and vomiting.   Endocrine: Negative for cold intolerance and heat intolerance.   Genitourinary:  Negative for difficulty urinating, dysuria, frequency and hematuria.   Musculoskeletal:  Positive for back pain and gait problem. Negative for arthralgias and myalgias.   Skin:  Positive for wound.   Neurological:  Negative for dizziness, weakness, light-headedness and headaches.   Hematological:  Negative for adenopathy. Does not bruise/bleed easily.   Psychiatric/Behavioral:  Negative for agitation, behavioral problems and confusion. The patient is not nervous/anxious.    Objective:     Vital Signs (Most Recent):  Temp: 98.2 °F (36.8 °C) (10/01/22 1533)  Pulse: 82 (10/01/22 1533)  Resp: 18 (10/01/22 1533)  BP: (!) 130/51 (10/01/22 1533)  SpO2: 98 % (10/01/22 1533)   Vital Signs (24h Range):  Temp:  [97.5 °F (36.4 °C)-101.6 °F (38.7 °C)] 98.2 °F (36.8 °C)  Pulse:  [77-88] 82  Resp:  [18-20] 18  SpO2:  [93 %-98 %] 98 %  BP: (130-155)/(51-72) 130/51     Weight: 99 kg (218 lb 4.1 oz)  Body mass index is 30.44 kg/m².    Intake/Output Summary (Last 24 hours) at 10/1/2022 1631  Last data filed at 10/1/2022 1230  Gross per 24 hour   Intake 120 ml   Output 600 ml    Net -480 ml        Physical Exam  Vitals and nursing note reviewed.   Constitutional:       General: He is not in acute distress.     Appearance: He is well-developed. He is ill-appearing. He is not diaphoretic.   HENT:      Head: Normocephalic and atraumatic.      Right Ear: Hearing normal.      Left Ear: Hearing normal.      Nose: No mucosal edema.      Mouth/Throat:      Pharynx: Uvula midline.   Eyes:      General:         Right eye: No discharge.         Left eye: No discharge.      Conjunctiva/sclera: Conjunctivae normal.      Right eye: No chemosis.     Left eye: No chemosis.  Neck:      Thyroid: No thyroid mass or thyromegaly.      Trachea: Trachea normal.   Pulmonary:      Effort: Pulmonary effort is normal. No respiratory distress.      Breath sounds: No decreased breath sounds.   Abdominal:      General: Bowel sounds are decreased.   Musculoskeletal:         General: Normal range of motion.      Right Lower Extremity: Right leg is amputated below knee.   Feet:      Comments: Left foot, wound vac with boot  Lymphadenopathy:      Upper Body:      Right upper body: No supraclavicular adenopathy.      Left upper body: No supraclavicular adenopathy.   Skin:     General: Skin is warm and dry.      Findings: No rash.   Neurological:      Mental Status: He is alert and oriented to person, place, and time.   Psychiatric:         Mood and Affect: Mood is not anxious.         Speech: Speech normal.       Significant Labs: All pertinent labs within the past 24 hours have been reviewed.  CBC:   Recent Labs   Lab 09/30/22  0513 10/01/22  1228   WBC 4.57  4.55 4.28   HGB 8.8*  8.7* 8.8*   HCT 28.5*  28.9* 27.5*   *  144* 155       CMP:   Recent Labs   Lab 09/30/22  0513 10/01/22  1228   * 131*   K 4.9 4.8    110   CO2 13* 10*   * 171*   BUN 17 15   CREATININE 1.3 1.5*   CALCIUM 8.1* 9.0   PROT 4.8* 5.7*   ALBUMIN 1.9* 2.0*   BILITOT 0.3 0.3   ALKPHOS 140* 133   AST 16 15   ALT 9* 8*    ANIONGAP 10 11         Significant Imaging: I have reviewed all pertinent imaging results/findings within the past 24 hours.

## 2022-10-01 NOTE — NURSING
Pt refusing AM medicine and assessment by nursing staff. Pt febrile and refusing tylenol. Attempted pt education. MD notified.

## 2022-10-01 NOTE — PLAN OF CARE
Patient refusing education, there is a need for reinforcement. Patient is not agreeable to current plan, but participated.

## 2022-10-01 NOTE — CONSULTS
Thank you for your consult to [facility]. We have reviewed the patient chart. This patient does meet criteria for [facility] service at this time. Will assume care on 10/01/22 at 7AM
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
## 2022-10-01 NOTE — PROGRESS NOTES
Lavelle Ladd is a 67 y.o. male patient.     Subjective  The patient is now almost 2 months out from his left distal tibial and fibular fractures.  External fixation had to be removed due to development of calcaneal osteomyelitis.  Amputation was recommended but he was not initially receptive to that.  Has been seen by vascular surgery and underwent angiogram.    The patient says he understands that there is no other choice an amputation although he does not really want to have that done.    1. Osteomyelitis of left ankle, unspecified type    2. Post-operative pain    3. Closed fracture of left tibia and fibula with routine healing, subsequent encounter    4. Acute osteomyelitis of left calcaneus    5. Wound infection complicating hardware, subsequent encounter    6. Infection of deep incisional surgical site after procedure, initial encounter    7. Hx of right BKA    8. History of kidney transplant    9. Immunocompromised state due to drug therapy    10. Chest pain    11. PAD (peripheral artery disease)    12. PVD (peripheral vascular disease)    13. Aftercare involving removal of external fixation device    14. Status post incision and drainage    15. Bradycardia      Past Medical History:   Diagnosis Date    DINORAH (acute kidney injury) 2016    Arthritis     CAD in native artery 2019    CHF (congestive heart failure)     Chronic obstructive pulmonary disease 2016    Coronary artery disease involving native coronary artery of native heart without angina pectoris 2016    CRI (chronic renal insufficiency) 2019     donor kidney transplant for DM 16     Induction with Thymo x3 and IV solumedrol to total 875mg  Kidney Biopsy  2016: 16 glomeruli, ACR type 1 AVR type 2, significant microcirculatory changes, c4d negative, No DSA, 5 to10% fibrosis. Treated with thymo x8 2016- no rejection      Diastolic heart failure     Encounter for blood transfusion     ESRD on RRT since  10/2013 10/29/2013    Biopsy proven diabetic nephropathy and lymphoplasmacytic interstitial infiltrate not c/w with AIN (ddx sjogrens or assoc with tamm-horsefall protein extravasation)     GERD (gastroesophageal reflux disease)     History of hepatitis C, s/p successful Rx w/ SVR12 - 4/2017 4/5/2017    Completed 12 weeks harvoni w/ SVR    Hyperlipidemia     Hypertension     PAD (peripheral artery disease) 7/21/2019    PIC line (peripherally inserted central catheter) flush     Prophylactic immunotherapy     Proteinuria     PVD (peripheral vascular disease) 6/26/2017    RLE BKA CT 12/11/16 Extensive atherosclerotic disease of the aorta and mesenteric arteries.     Renal hypertension     Type 2 diabetes mellitus with diabetic neuropathy, with long-term current use of insulin 12/1/2016    Vitamin B12 deficiency      Past Surgical History Pertinent Negatives:   Procedure Date Noted    CARDIAC SURGERY 12/08/2015     Scheduled Meds:   carvediloL  12.5 mg Oral BID    cefTRIAXone (ROCEPHIN) IVPB  2 g Intravenous Q24H    cholestyramine  1 packet Oral BID    dicyclomine  10 mg Oral QID (AC & HS)    epoetin dyana-epbx  50 Units/kg Subcutaneous Every Mon, Wed, Fri    gabapentin  100 mg Oral TID    heparin (porcine)  5,000 Units Subcutaneous Q8H    Lactobacillus acidoph-L.bulgar  4 tablet Oral TID WM    linaCLOtide  145 mcg Oral Before breakfast    magnesium oxide  400 mg Oral BID    metroNIDAZOLE  500 mg Oral Q8H    ondansetron  4 mg Intravenous Q8H    sertraline  25 mg Oral Daily    tacrolimus  1 mg Oral BID     Continuous Infusions:  PRN Meds:sodium chloride 0.9%, acetaminophen, albuterol-ipratropium, aluminum-magnesium hydroxide-simethicone, dextrose 10%, dextrose 10%, glucagon (human recombinant), glucose, glucose, HYDROcodone-acetaminophen, HYDROcodone-acetaminophen, insulin aspart U-100, magnesium oxide, magnesium oxide, melatonin, morphine, morphine, naloxone, ondansetron, prochlorperazine, simethicone, sodium chloride  0.9%    Review of patient's allergies indicates:  No Known Allergies  Active Hospital Problems    Diagnosis  POA    *Acute osteomyelitis of left calcaneus [M86.172]  Yes    Electrolyte abnormality [E87.8]  No    Peripheral artery disease [I73.9]  Yes    Infection of deep incisional surgical site after procedure [T81.42XA]  Yes    Stage 3 chronic kidney disease [N18.30]  Yes    Long term current use of immunosuppressive drug [Z79.899]  Not Applicable    Kidney transplant recipient [Z94.0]  Not Applicable    H/O amputation [Z89.9]  Yes    Chronic obstructive pulmonary disease [J44.9]  Yes    Coronary artery disease of native artery of native heart with stable angina pectoris [I25.118]  Yes    Type 2 diabetes mellitus with hyperglycemia, with long-term current use of insulin [E11.65, Z79.4]  Not Applicable     donor kidney transplant for DM 16 [Z94.0]  Not Applicable     Chronic     Induction with Thymo x3 and IV solumedrol to total 875mg    Kidney Biopsy   2016: 16 glomeruli, ACR type 1 AVR type 2, significant microcirculatory changes, c4d negative, No DSA, 5 to10% fibrosis. Treated with thymo x8 (5 full, 3 half doses)  2016: insufficient sample since it had no glomeruli; C4d was negative in peritubular capillaries but insufficient tissue to comment on presence of rejection  8/15/16: 25 glomeruli, moderate microcirculatory inflammation, positive C4d (20-30%), interstitial infiltrate with tubulitis but <5% thus not meeting diagnostic criteria for ACR but suspicious for ACR, no AVR, CNI toxicity, ~10% interstitial fibrosis, 7 foci of calcium phos crystals. Rx SM pulsex3 in BR -16 Endothelial cell crossmatch against target cells EC1 and EC2 negative, no DILIA antibodies detected. AT1R also negative. At present time no change in management. (resulted )        Essential hypertension [I10]  Yes      Resolved Hospital Problems    Diagnosis Date Resolved POA    Hyponatremia [E87.1]  "09/22/2022 Yes     Blood pressure (!) 144/64, pulse 78, temperature 98 °F (36.7 °C), temperature source Oral, resp. rate 18, height 5' 11" (1.803 m), weight 99 kg (218 lb 4.1 oz), SpO2 96 %.    Objective:  Vital signs (most recent): Blood pressure (!) 144/64, pulse 78, temperature 98 °F (36.7 °C), temperature source Oral, resp. rate 18, height 5' 11" (1.803 m), weight 99 kg (218 lb 4.1 oz), SpO2 96 %.    The patient is awake, alert, oriented.  Has the fracture boot in place with the wound vacs.     Assessment & Plan  The patient is now receptive to the plan of amputation.  We will discuss further with vascular surgery.       10/1/2022        Good [place] MD, FAAOS [facility], Orthopedic Trauma Service  [place]    "

## 2022-10-01 NOTE — ASSESSMENT & PLAN NOTE
9/14/22: c/o of left ankle pain-controlled. Pt was scheduled for surgery, however, surgery was post-poned 2/2 hyponatremia -Na 126. WBC normal, afebrile. Blood cultures show NGTD. .3. cont IV Abx  09/15/22- removal of the external fixator and debridement of osteomyelitis planned for today- procedure postponed due to hyponatremia- Lasix given - Nephrology following   9/16/22 - procedure postponed due to hyperglycemia  9/17/22 - post op day 1 - ABX in progress  9/19/22 - post op day 3 - Cefepime continued, Zyvox stopped. NWB. Wound care consulted for wound vac changes  9/20/22 - bone cultures pending. Aerobic culture isolated proteus mirabils  9/21/22 - Proteus mirabilis and B. faecis - Unasyn  9/22/22 - 6 weeks of Rocephin and Flagyl per Dr. Veliz  9/24/22 Vascular surgery was unable to complete the angiogram yesterday d/t refusing it. Will await further recs by Vascular surgery. Continue treatment with IV ABX  9/25/22 Ortho is recommending a BKA per vascular surgery. Awaiting Vascular surgery recs. Continue current management with IV ABX.   9/26/22 The patient is alert and oriented today. He reports that he does not remember refusing the angiogram on 9/23/22. He reports that he is agreeable to the procedure. Vascular Surgery was reconsulted today. Ortho is following. Continue IV ABX. Wound vac is in place.   9/27/22 Vascular surgery reconsulted and plans to do angiogram on 9/29/22 to evaluate for reperfusion vs amputation. Ortho is following.   9/28/22 Vascular surgery plans for a LLE angiogram in AM. NPO after MN. Ortho following   9/29/22 The patients renal function improved with IVF's. Plans for angiogram of LLE today with Vascular surgery to determine if revascularization is possible or if the patient will need a BKA. Will consult PT/OT for recs to assist with placement.   10/1/22 patient febrile ON, septic workup ordered

## 2022-10-02 NOTE — ASSESSMENT & PLAN NOTE
9/14/22: c/o of left ankle pain-controlled. Pt was scheduled for surgery, however, surgery was post-poned 2/2 hyponatremia -Na 126. WBC normal, afebrile. Blood cultures show NGTD. .3. cont IV Abx  09/15/22- removal of the external fixator and debridement of osteomyelitis planned for today- procedure postponed due to hyponatremia- Lasix given - Nephrology following   9/16/22 - procedure postponed due to hyperglycemia  9/17/22 - post op day 1 - ABX in progress  9/19/22 - post op day 3 - Cefepime continued, Zyvox stopped. NWB. Wound care consulted for wound vac changes  9/20/22 - bone cultures pending. Aerobic culture isolated proteus mirabils  9/21/22 - Proteus mirabilis and B. faecis - Unasyn  9/22/22 - 6 weeks of Rocephin and Flagyl per Dr. Veliz  9/24/22 Vascular surgery was unable to complete the angiogram yesterday d/t refusing it. Will await further recs by Vascular surgery. Continue treatment with IV ABX  9/25/22 Ortho is recommending a BKA per vascular surgery. Awaiting Vascular surgery recs. Continue current management with IV ABX.   9/26/22 The patient is alert and oriented today. He reports that he does not remember refusing the angiogram on 9/23/22. He reports that he is agreeable to the procedure. Vascular Surgery was reconsulted today. Ortho is following. Continue IV ABX. Wound vac is in place.   9/27/22 Vascular surgery reconsulted and plans to do angiogram on 9/29/22 to evaluate for reperfusion vs amputation. Ortho is following.   9/28/22 Vascular surgery plans for a LLE angiogram in AM. NPO after MN. Ortho following   9/29/22 The patients renal function improved with IVF's. Plans for angiogram of LLE today with Vascular surgery to determine if revascularization is possible or if the patient will need a BKA. Will consult PT/OT for recs to assist with placement.   10/1/22 patient febrile ON, septic workup ordered  10/2: BC: NGTD, Afebrile overnight

## 2022-10-02 NOTE — ASSESSMENT & PLAN NOTE
Left TMA - 2019  Right BKA      Ortho advising patient he may need a left BKA  9/30: Vascular Surgery recommends continue plan for 6w of IV antibiotics with wound care, if no improvement of clinical worsening, proceed with Left BKA.  10/1: Orthopedic and Vascular Surgery agree amputation is needed at this time, Orthopedic discussed this with the patient, patient reluctantly agreed to to Left BKA, will be planned for this week.

## 2022-10-02 NOTE — PLAN OF CARE
Problem: Adult Inpatient Plan of Care  Goal: Absence of Hospital-Acquired Illness or Injury  Intervention: Prevent Skin Injury  Flowsheets (Taken 10/2/2022 0355)  Body Position: neutral head position  Skin Protection: tubing/devices free from skin contact     Problem: Fall Injury Risk  Goal: Absence of Fall and Fall-Related Injury  Intervention: Identify and Manage Contributors  Flowsheets (Taken 10/2/2022 0355)  Medication Review/Management: medications reviewed

## 2022-10-02 NOTE — ASSESSMENT & PLAN NOTE
-Orthopedic Surgery following   -IV antibiotics  - Rocephin and Flagyl x 6 weeks  Proteus Mirabilis and B. faecis   -NWB LLE  DVT prophylaxis 24 hours after surgery   -Post op removal of the external fixator and debridement of osteomyelitis   -analgesia as needed   Per Ortho -  He just needs to be set up with home health for wound checks and may follow-up with ortho next week for a recheck ( pt to return to [facility])  --10/2: Plan for Left BKA this week due to minimal improvement and continued systemic symptoms of infection

## 2022-10-02 NOTE — SUBJECTIVE & OBJECTIVE
Interval History: Patient more cooperative today, tearful when discussing amputation of his leg. Will be planned for this week.     Review of Systems   Constitutional:  Positive for activity change and fatigue. Negative for appetite change, chills, diaphoresis, fever and unexpected weight change.   HENT:  Negative for congestion, hearing loss, nosebleeds, postnasal drip, rhinorrhea and trouble swallowing.    Eyes:  Negative for discharge and visual disturbance.   Respiratory:  Negative for cough, chest tightness and shortness of breath.    Cardiovascular:  Negative for chest pain, palpitations and leg swelling.   Gastrointestinal:  Positive for abdominal distention. Negative for abdominal pain, constipation, diarrhea, nausea and vomiting.   Endocrine: Negative for cold intolerance and heat intolerance.   Genitourinary:  Negative for difficulty urinating, dysuria, frequency and hematuria.   Musculoskeletal:  Positive for back pain and gait problem. Negative for arthralgias and myalgias.   Skin:  Positive for wound.   Neurological:  Negative for dizziness, weakness, light-headedness and headaches.   Hematological:  Negative for adenopathy. Does not bruise/bleed easily.   Psychiatric/Behavioral:  Negative for agitation, behavioral problems and confusion. The patient is not nervous/anxious.    Objective:     Vital Signs (Most Recent):  Temp: 98.2 °F (36.8 °C) (10/02/22 1059)  Pulse: 74 (10/02/22 1059)  Resp: 18 (10/02/22 1059)  BP: (!) 97/52 (10/02/22 1059)  SpO2: 95 % (10/02/22 1059)   Vital Signs (24h Range):  Temp:  [97.5 °F (36.4 °C)-98.7 °F (37.1 °C)] 98.2 °F (36.8 °C)  Pulse:  [73-95] 74  Resp:  [18-20] 18  SpO2:  [93 %-100 %] 95 %  BP: ()/(51-63) 97/52     Weight: 98 kg (216 lb 0.8 oz)  Body mass index is 30.13 kg/m².    Intake/Output Summary (Last 24 hours) at 10/2/2022 1140  Last data filed at 10/2/2022 0826  Gross per 24 hour   Intake 200 ml   Output 425 ml   Net -225 ml      Physical Exam  Vitals and  nursing note reviewed.   Constitutional:       General: He is not in acute distress.     Appearance: He is well-developed. He is ill-appearing. He is not diaphoretic.   HENT:      Head: Normocephalic and atraumatic.      Right Ear: Hearing and external ear normal.      Left Ear: Hearing and external ear normal.      Nose: Nose normal. No mucosal edema or rhinorrhea.      Mouth/Throat:      Pharynx: Uvula midline.   Eyes:      General:         Right eye: No discharge.         Left eye: No discharge.      Conjunctiva/sclera: Conjunctivae normal.      Right eye: No chemosis.     Left eye: No chemosis.     Pupils: Pupils are equal, round, and reactive to light.   Neck:      Thyroid: No thyroid mass or thyromegaly.      Trachea: Trachea normal.   Cardiovascular:      Rate and Rhythm: Normal rate and regular rhythm.      Heart sounds: Normal heart sounds. No murmur heard.  Pulmonary:      Effort: Pulmonary effort is normal. No respiratory distress.      Breath sounds: Normal breath sounds. No decreased breath sounds or wheezing.   Abdominal:      General: Bowel sounds are decreased. There is distension.      Palpations: Abdomen is soft.      Tenderness: There is no abdominal tenderness.   Musculoskeletal:         General: Normal range of motion.      Cervical back: Normal range of motion and neck supple.      Right Lower Extremity: Right leg is amputated below knee.   Feet:      Comments: Left foot, wound vac with boot  Lymphadenopathy:      Cervical: No cervical adenopathy.      Upper Body:      Right upper body: No supraclavicular adenopathy.      Left upper body: No supraclavicular adenopathy.   Skin:     General: Skin is warm and dry.      Capillary Refill: Capillary refill takes less than 2 seconds.      Findings: No rash.   Neurological:      Mental Status: He is alert and oriented to person, place, and time.   Psychiatric:         Mood and Affect: Mood is not anxious.         Speech: Speech normal.          Behavior: Behavior normal.         Thought Content: Thought content normal.         Judgment: Judgment normal.       Significant Labs: All pertinent labs within the past 24 hours have been reviewed.  CBC:   Recent Labs   Lab 10/01/22  1228   WBC 4.28   HGB 8.8*   HCT 27.5*        CMP:   Recent Labs   Lab 10/01/22  1228   *   K 4.8      CO2 10*   *   BUN 15   CREATININE 1.5*   CALCIUM 9.0   PROT 5.7*   ALBUMIN 2.0*   BILITOT 0.3   ALKPHOS 133   AST 15   ALT 8*   ANIONGAP 11       Significant Imaging: I have reviewed all pertinent imaging results/findings within the past 24 hours.

## 2022-10-02 NOTE — ASSESSMENT & PLAN NOTE
Patient's FSGs are uncontrolled due to hyperglycemia on current medication regimen.  Last A1c reviewed-   Lab Results   Component Value Date    HGBA1C 7.4 (H) 08/04/2022     Most recent fingerstick glucose reviewed-   Recent Labs   Lab 10/01/22  1625 10/01/22  2032 10/02/22  0451 10/02/22  1056   POCTGLUCOSE 167* 193* 169* 132*     Current correctional scale  Low  Maintain anti-hyperglycemic dose as follows-   Antihyperglycemics (From admission, onward)    Start     Stop Route Frequency Ordered    09/26/22 0102  insulin aspart U-100 pen 0-5 Units         -- SubQ Before meals & nightly PRN 09/26/22 0102        Levemir added- changed to bid dosing and moderate sliding scale - dose increased from 16 units to 30 Units  Hold Oral hypoglycemics while patient is in the hospital.  9/24/22 The patient had a rapid response called over night. His blood glucose dropped to <20. He was given an amp of D50 and he returned to baseline.
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
## 2022-10-03 NOTE — PLAN OF CARE
Problem: Adult Inpatient Plan of Care  Goal: Absence of Hospital-Acquired Illness or Injury  Intervention: Identify and Manage Fall Risk  Flowsheets (Taken 10/3/2022 0354)  Safety Promotion/Fall Prevention:   assistive device/personal item within reach   bed alarm refused   side rails raised x 2   side rails raised x 3   Fall Risk signage in place   Fall Risk reviewed with patient/family     Problem: Adult Inpatient Plan of Care  Goal: Absence of Hospital-Acquired Illness or Injury  Intervention: Prevent Skin Injury  Flowsheets (Taken 10/3/2022 0354)  Skin Protection: adhesive use limited

## 2022-10-03 NOTE — ASSESSMENT & PLAN NOTE
Arterial studies of left leg indicates significant stenosis  Vascular consult for possible angiogram >>> 9/23/22  NPO after MN  9/29/22 Plan for angiogram today- completed  --Plan for amputation of left leg
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
## 2022-10-03 NOTE — SUBJECTIVE & OBJECTIVE
Interval History: Awaiting confirmation of date/time for amputation.     Review of Systems   Constitutional:  Positive for activity change and fatigue. Negative for appetite change, chills, diaphoresis, fever and unexpected weight change.   HENT:  Negative for congestion, hearing loss, nosebleeds, postnasal drip, rhinorrhea and trouble swallowing.    Eyes:  Negative for discharge and visual disturbance.   Respiratory:  Negative for cough, chest tightness and shortness of breath.    Cardiovascular:  Negative for chest pain, palpitations and leg swelling.   Gastrointestinal:  Positive for abdominal distention. Negative for abdominal pain, constipation, diarrhea, nausea and vomiting.   Endocrine: Negative for cold intolerance and heat intolerance.   Genitourinary:  Negative for difficulty urinating, dysuria, frequency and hematuria.   Musculoskeletal:  Positive for back pain and gait problem. Negative for arthralgias and myalgias.   Skin:  Positive for wound.   Neurological:  Negative for dizziness, weakness, light-headedness and headaches.   Hematological:  Negative for adenopathy. Does not bruise/bleed easily.   Psychiatric/Behavioral:  Negative for agitation, behavioral problems and confusion. The patient is not nervous/anxious.    Objective:     Vital Signs (Most Recent):  Temp: 97.7 °F (36.5 °C) (10/03/22 1132)  Pulse: 75 (10/03/22 1132)  Resp: 18 (10/03/22 1132)  BP: (!) 126/59 (10/03/22 1132)  SpO2: 97 % (10/03/22 1132)   Vital Signs (24h Range):  Temp:  [97.7 °F (36.5 °C)-98.5 °F (36.9 °C)] 97.7 °F (36.5 °C)  Pulse:  [70-82] 75  Resp:  [17-20] 18  SpO2:  [92 %-100 %] 97 %  BP: ()/(40-78) 126/59     Weight: 98 kg (216 lb 0.8 oz)  Body mass index is 30.13 kg/m².    Intake/Output Summary (Last 24 hours) at 10/3/2022 1239  Last data filed at 10/2/2022 1750  Gross per 24 hour   Intake 120 ml   Output 201 ml   Net -81 ml      Physical Exam  Vitals and nursing note reviewed.   Constitutional:       General: He  is not in acute distress.     Appearance: He is well-developed. He is ill-appearing. He is not diaphoretic.   HENT:      Head: Normocephalic and atraumatic.      Right Ear: Hearing and external ear normal.      Left Ear: Hearing and external ear normal.      Nose: Nose normal. No mucosal edema or rhinorrhea.      Mouth/Throat:      Pharynx: Uvula midline.   Eyes:      General:         Right eye: No discharge.         Left eye: No discharge.      Conjunctiva/sclera: Conjunctivae normal.      Right eye: No chemosis.     Left eye: No chemosis.     Pupils: Pupils are equal, round, and reactive to light.   Neck:      Thyroid: No thyroid mass or thyromegaly.      Trachea: Trachea normal.   Cardiovascular:      Rate and Rhythm: Normal rate and regular rhythm.      Heart sounds: Normal heart sounds. No murmur heard.  Pulmonary:      Effort: Pulmonary effort is normal. No respiratory distress.      Breath sounds: Normal breath sounds. No decreased breath sounds or wheezing.   Abdominal:      General: Bowel sounds are decreased. There is distension.      Palpations: Abdomen is soft.      Tenderness: There is no abdominal tenderness.   Musculoskeletal:         General: Normal range of motion.      Cervical back: Normal range of motion and neck supple.      Right Lower Extremity: Right leg is amputated below knee.   Feet:      Comments: Left foot, wound vac with boot  Lymphadenopathy:      Cervical: No cervical adenopathy.      Upper Body:      Right upper body: No supraclavicular adenopathy.      Left upper body: No supraclavicular adenopathy.   Skin:     General: Skin is warm and dry.      Capillary Refill: Capillary refill takes less than 2 seconds.      Findings: No rash.   Neurological:      Mental Status: He is alert and oriented to person, place, and time.   Psychiatric:         Mood and Affect: Mood is not anxious.         Speech: Speech normal.         Behavior: Behavior normal.         Thought Content: Thought content  normal.         Judgment: Judgment normal.       Significant Labs: All pertinent labs within the past 24 hours have been reviewed.  CBC:   Recent Labs   Lab 10/02/22  1204 10/03/22  0648   WBC 3.87* 4.05   HGB 8.8* 9.5*   HCT 29.0* 31.7*   * 181     CMP:   Recent Labs   Lab 10/02/22  1204 10/03/22  0648   * 134*   K 4.7 5.0    109   CO2 13* 15*   * 147*   BUN 11 12   CREATININE 1.4 1.1   CALCIUM 8.1* 8.6*   PROT 4.8* 5.3*   ALBUMIN 1.9* 2.1*   BILITOT 0.3 0.3   ALKPHOS 126 132   AST 15 16   ALT 9* 7*   ANIONGAP 11 10       Significant Imaging: I have reviewed all pertinent imaging results/findings within the past 24 hours.

## 2022-10-03 NOTE — ASSESSMENT & PLAN NOTE
-Orthopedic Surgery following   -IV antibiotics  - Rocephin and Flagyl x 6 weeks  Proteus Mirabilis and B. faecis   -NWB LLE  DVT prophylaxis 24 hours after surgery   -Post op removal of the external fixator and debridement of osteomyelitis   -analgesia as needed   Per Ortho -  He just needs to be set up with home health for wound checks and may follow-up with ortho next week for a recheck ( pt to return to [facility])  --10/2: Plan for Left BKA this week due to minimal improvement and continued systemic symptoms of infection

## 2022-10-03 NOTE — ASSESSMENT & PLAN NOTE
Patient's FSGs are uncontrolled due to hyperglycemia on current medication regimen.  Last A1c reviewed-   Lab Results   Component Value Date    HGBA1C 7.4 (H) 08/04/2022     Most recent fingerstick glucose reviewed-   Recent Labs   Lab 10/02/22  1516 10/03/22  0532 10/03/22  1143   POCTGLUCOSE 192* 149* 161*     Current correctional scale  Low  Maintain anti-hyperglycemic dose as follows-   Antihyperglycemics (From admission, onward)    Start     Stop Route Frequency Ordered    09/26/22 0102  insulin aspart U-100 pen 0-5 Units         -- SubQ Before meals & nightly PRN 09/26/22 0102        Levemir added- changed to bid dosing and moderate sliding scale - dose increased from 16 units to 30 Units  Hold Oral hypoglycemics while patient is in the hospital.  9/24/22 The patient had a rapid response called over night. His blood glucose dropped to <20. He was given an amp of D50 and he returned to baseline.   10/3: Stable

## 2022-10-04 NOTE — PROGRESS NOTES
Attempted visit on Mr Ladd. He refused wound vac change. Patient undergoing amputation this week. Orders placed for wound vac removal and daily moist gauze dressings until sx. Will follow and assist as needed.

## 2022-10-04 NOTE — PLAN OF CARE
Ongoing (interventions implemented as appropriate)  Pt AAO x4.  VSS  Pt able to make needs known.  Pt remained afebrile throughout this shift.   Pt is bedbound  Pt remained free of falls this shift.   Pt denies pain this shift.  Plan of care reviewed. Patient verbalizes understanding.   Pt moving/turing independent in bed. Frequent weight shifting encouraged.  Patient sinus rhythm on monitor.   Bed low, side rails up x 2, wheels locked, call light in reach.   Hourly rounding completed.   Pt refuses to allow me to take off the wound vac...he states his leg will be cut off Thurs  Will continue to monitor.

## 2022-10-04 NOTE — PLAN OF CARE
Nutrition recommendation 10/4:  1. Recommend advance to diabetic/cardiac diet when medically appropriate   2. Recommend pt continues to receive Arginaid packet BID to assist with wound healing  3. Recommend appetite stimulant when medially stable, per MD  3. Collaborate with medical providers  Sherri Heard Dietitian

## 2022-10-04 NOTE — SUBJECTIVE & OBJECTIVE
Interval History: see above    Review of Systems   Constitutional:  Positive for activity change and fatigue. Negative for appetite change, chills, diaphoresis, fever and unexpected weight change.   HENT:  Negative for congestion, hearing loss, nosebleeds, postnasal drip, rhinorrhea and trouble swallowing.    Eyes:  Negative for discharge and visual disturbance.   Respiratory:  Negative for cough, chest tightness and shortness of breath.    Cardiovascular:  Negative for chest pain, palpitations and leg swelling.   Gastrointestinal:  Positive for abdominal distention. Negative for abdominal pain, constipation, diarrhea, nausea and vomiting.   Endocrine: Negative for cold intolerance and heat intolerance.   Genitourinary:  Negative for difficulty urinating, dysuria, frequency and hematuria.   Musculoskeletal:  Positive for back pain, gait problem and joint swelling. Negative for arthralgias and myalgias.   Skin:  Positive for wound.   Neurological:  Negative for dizziness, weakness, light-headedness and headaches.   Hematological:  Negative for adenopathy. Does not bruise/bleed easily.   Psychiatric/Behavioral:  Positive for agitation. Negative for behavioral problems and confusion. The patient is not nervous/anxious.    Objective:     Vital Signs (Most Recent):  Temp: 97.6 °F (36.4 °C) (10/04/22 1216)  Pulse: 76 (10/04/22 1216)  Resp: 18 (10/04/22 1216)  BP: (!) 148/59 (10/04/22 1216)  SpO2: 99 % (10/04/22 1216)   Vital Signs (24h Range):  Temp:  [97.6 °F (36.4 °C)-98.9 °F (37.2 °C)] 97.6 °F (36.4 °C)  Pulse:  [70-76] 76  Resp:  [17-20] 18  SpO2:  [94 %-99 %] 99 %  BP: ()/(45-60) 148/59     Weight: 98 kg (216 lb 0.8 oz)  Body mass index is 30.13 kg/m².  No intake or output data in the 24 hours ending 10/04/22 1517   Physical Exam  Vitals and nursing note reviewed.   Constitutional:       Appearance: He is ill-appearing.      Comments: [place] pulled over head, refusing to answer questions   HENT:      Head:  Normocephalic and atraumatic.   Cardiovascular:      Rate and Rhythm: Normal rate.   Pulmonary:      Effort: Pulmonary effort is normal. No accessory muscle usage or respiratory distress.   Abdominal:      General: Abdomen is protuberant. There is distension.   Musculoskeletal:         General: Normal range of motion.      Cervical back: Normal range of motion.   Skin:     Comments: Wound vac to the left foot with walking boot in place   Neurological:      Mental Status: He is oriented to person, place, and time.   Psychiatric:         Attention and Perception: He is inattentive.         Mood and Affect: Mood is depressed. Affect is flat.         Behavior: Behavior is uncooperative, agitated and withdrawn.       Significant Labs: All pertinent labs within the past 24 hours have been reviewed.  CBC:   Recent Labs   Lab 10/03/22  0648   WBC 4.05   HGB 9.5*   HCT 31.7*        CMP:   Recent Labs   Lab 10/03/22  0648   *   K 5.0      CO2 15*   *   BUN 12   CREATININE 1.1   CALCIUM 8.6*   PROT 5.3*   ALBUMIN 2.1*   BILITOT 0.3   ALKPHOS 132   AST 16   ALT 7*   ANIONGAP 10       Significant Imaging: I have reviewed all pertinent imaging results/findings within the past 24 hours.

## 2022-10-04 NOTE — PROGRESS NOTES
"[facility] - Telemetry ([facility])  Adult Nutrition  Progress Note    SUMMARY       Recommendations    Recommendation/Intervention:   1. Recommend advance to diabetic/cardiac diet when medically appropriate   2. Recommend pt continues to receive Arginaid packet BID to assist with wound healing  3. Recommend appetite stimulant when medically stable, per MD    Goals:   1.Pt will consume % PO intake and Arginaid packet supplements for wound healing by RD follow up  Nutrition Goal Status: continues  Communication of RD Recs: other (comment) (Plan of care: Sticky note)    Assessment and Plan    Nutrition Problem  Limited food acceptance     Related to (etiology):   Inadequate energy intake    Signs and Symptoms (as evidenced by):   Less than/equal to 25% PO intake x 5 days, pt refusing to eat      Interventions/Recommendations (treatment strategy):  1. Recommend advance to diabetic/cardiac diet when medically appropriate   2. Recommend pt continues to receive Arginaid packet BID to assist with wound healing  3. Recommend appetite stimulant when medially stable, per MD  3. Collaborate with medical providers     Nutrition Diagnosis Status:   New    Reason for Assessment    Reason For Assessment: consult, RD follow-up, length of stay  Diagnosis: infection/sepsis  Relevant Medical History: HTN, T2DM, CAD, COPD, H/O amputation, CKD3  Interdisciplinary Rounds: did not attend  General Information Comments: 9/23/22: RD assess pt for consult of needing oral supplument, LOS and RD follow up. Pt states that he does not feel well and this has cause him not to have a appetite. when the pt was asked how much food he was consuming he replied "I dont know" the RD estimates this amount to be 0-25%. The pt states that his appetite has been like this for the last three though four days. The pt says that he has experiencing v/n but would not specifi how freakqently or how often. The pt complains that his wound is bothering him. Based on " "his PMH, I believe [place] with carb steady is a good supplument for this pt.  Nutrition Discharge Planning: cardiac, renal diet    Follow up    10/4: Visited pt at bedside, pt sleeping, covers over head, did not want to be disturbed. Spoke to RN, stated pt will eat but when he wants, reports pt not experiencing any N/V/D, pt refusing to eat. Per nurse practitioner note 10/3 "patient is upset about having another amputation",""schedule amputation for this week, awaiting conformation of day and time". LBM 10/3. Labs, meds and weight reviewed. Na (L), Calcium (L), Glu (H). 8 lb wt loss in 1 month. RD will continue to monitor.      9/28: Per LPN note 9/27 pt agitated every time staff goes in pt room, spoke to RN, confirmed pt agitation. RN stated pt is tolerating diet and supplements with no N/V/D reported, confirmed % PO intake, wound still needs to heal. LBM  9/26. Labs and weight reviewed. RD will continue to monitor for tolerance and wound healing.     9/26: Visited pt twice at bedside, pt sleeping both times. Spoke to RN, confirmed PO intake at 100%. Reported pt has not been receiving Arginaid. Brought RN two packets of Arginaid personally. Reviewed labs, noted high potassium, recommended packets versus supplement drink d/t lower potassium in packets. LBM 9/25. Labs and weight reviewed. RD will continue to monitor.     Nutrition Risk Screen    Nutrition Risk Screen: large or nonhealing wound, burn or pressure injury    Nutrition/Diet History    Spiritual, Cultural Beliefs, Protestant Practices, Values that Affect Care: no  Food Allergies: NKFA  Factors Affecting Nutritional Intake: decreased appetite    Anthropometrics    Temp: 98.3 °F (36.8 °C)  Height Method: Stated  Height: 5' 11" (180.3 cm)  Height (inches): 71 in  Weight Method: Bed Scale  Weight: 98 kg (216 lb 0.8 oz)  Weight (lb): 216.05 lb  Ideal Body Weight (IBW), Male: 172 lb  % Ideal Body Weight, Male (lb): 125.99 %  BMI (Calculated): " 30.1  Amputation %: 5.9 (Right BKA)  Total Amputation %: 5.9  Amputation Ideal Body Weight (IBW), Male (lb): 166.1 lb  Amputee BMI (kg/m2): 32.21 kg/m2  Additional Documentation: Amputations Present (Ideal Body Weight)    Lab/Procedures/Meds    Pertinent Labs Reviewed: reviewed  Pertinent Labs Comments: Na 128, Cr 1.6, GFR 46, Glu 266, Mg 1.4, Alb 1.8, , ALT 94, A1c 7.4% on 8/4/22  Pertinent Medications Reviewed: reviewed  Pertinent Medications Comments: atorvastatin, carvedilol, furosemide, gabapentin, insulin, magnesium oxide, tacrolimus  BMP  Lab Results   Component Value Date     (L) 10/03/2022    K 5.0 10/03/2022     10/03/2022    CO2 15 (L) 10/03/2022    BUN 12 10/03/2022    CREATININE 1.1 10/03/2022    CALCIUM 8.6 (L) 10/03/2022    ANIONGAP 10 10/03/2022    ESTGFRAFRICA 47 (A) 08/15/2021    EGFRNONAA 41 (A) 08/15/2021      Lab Results   Component Value Date    CALCIUM 8.6 (L) 10/03/2022    PHOS 3.1 09/17/2022      Recent Labs   Lab 10/04/22  0511   POCTGLUCOSE 120*    Scheduled Meds:   carvediloL  12.5 mg Oral BID    cefTRIAXone (ROCEPHIN) IVPB  2 g Intravenous Q24H    cholestyramine  1 packet Oral BID    dicyclomine  10 mg Oral QID (AC & HS)    epoetin dyana-epbx  50 Units/kg Subcutaneous Every Mon, Wed, Fri    gabapentin  100 mg Oral TID    heparin (porcine)  5,000 Units Subcutaneous Q8H    Lactobacillus acidoph-L.bulgar  4 tablet Oral TID WM    linaCLOtide  145 mcg Oral Before breakfast    magnesium oxide  400 mg Oral BID    metroNIDAZOLE  500 mg Oral Q8H    ondansetron  4 mg Intravenous Q8H    sertraline  25 mg Oral Daily    tacrolimus  1 mg Oral BID     Continuous Infusions:  PRN Meds:.sodium chloride 0.9%, acetaminophen, albuterol-ipratropium, aluminum-magnesium hydroxide-simethicone, dextrose 10%, dextrose 10%, glucagon (human recombinant), glucose, glucose, HYDROcodone-acetaminophen, insulin aspart U-100, magnesium oxide, magnesium oxide, melatonin, morphine, naloxone, ondansetron,  prochlorperazine, simethicone, sodium chloride 0.9%   Physical Findings/Assessment    Wt Readings from Last 15 Encounters:   10/02/22 98 kg (216 lb 0.8 oz)   09/02/22 102 kg (224 lb 13.9 oz)   08/08/22 114.7 kg (252 lb 13.9 oz)   08/15/21 102.5 kg (226 lb)   02/11/21 101.6 kg (224 lb)   10/07/20 101.6 kg (224 lb)   08/12/20 101.6 kg (224 lb)   07/08/20 104.3 kg (230 lb)   07/07/20 104.3 kg (230 lb)   07/06/20 108.1 kg (238 lb 6.4 oz)   06/30/20 104.5 kg (230 lb 6.1 oz)   06/24/20 105.4 kg (232 lb 5.8 oz)   03/04/20 104.6 kg (230 lb 9.6 oz)   02/21/20 103.6 kg (228 lb 6.3 oz)   02/14/20 100.7 kg (222 lb)   ]    Estimated/Assessed Needs    Weight Used For Calorie Calculations: 75.5 kg (166 lb 7.2 oz)  Energy Calorie Requirements (kcal): 1850 (MSJ, AF 1.2)  Energy Need Method: [place]  Protein Requirements: 75-94  Weight Used For Protein Calculations: 75.5 kg (166 lb 7.2 oz)  Fluid Requirements (mL): 1850  Estimated Fluid Requirement Method: RDA Method  RDA Method (mL): 1850  CHO Requirement: 231      Nutrition Prescription Ordered    Current Diet Order: Cardiac   Current supplements ordered: Arginaid packets BID       Boost Glucose Control TID    Evaluation of Received Nutrient/Fluid Intake    % Kcal Needs: 0  % Protein Needs: 0  I/O: 0.5 L  Energy Calories Required: not meeting needs  Protein Required: not meeting needs  Fluid Required: not meeting needs  Comments: LBM 9/15  % Intake of Estimated Energy Needs: 0-25%  % Meal Intake: 0-25%    Nutrition Risk    Level of Risk/Frequency of Follow-up: high     Monitor and Evaluation    Food and Nutrient Intake: energy intake, food and beverage intake  Food and Nutrient Adminstration: diet order  Knowledge/Beliefs/Attitudes: food and nutrition knowledge/skill, beliefs and attitudes  Physical Activity and Function: nutrition-related ADLs and IADLs  Anthropometric Measurements: weight, weight change, body mass index  Biochemical Data, Medical Tests and Procedures:  electrolyte and renal panel, gastrointestinal profile, glucose/endocrine profile, inflammatory profile, lipid profile  Nutrition-Focused Physical Findings: overall appearance     Nutrition Follow-Up    RD Follow-up?: Yes  Sherri Heard Dietitian
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
## 2022-10-04 NOTE — ASSESSMENT & PLAN NOTE
9/14/22: c/o of left ankle pain-controlled. Pt was scheduled for surgery, however, surgery was post-poned 2/2 hyponatremia -Na 126. WBC normal, afebrile. Blood cultures show NGTD. .3. cont IV Abx  09/15/22- removal of the external fixator and debridement of osteomyelitis planned for today- procedure postponed due to hyponatremia- Lasix given - Nephrology following   9/16/22 - procedure postponed due to hyperglycemia  9/17/22 - post op day 1 - ABX in progress  9/19/22 - post op day 3 - Cefepime continued, Zyvox stopped. NWB. Wound care consulted for wound vac changes  9/20/22 - bone cultures pending. Aerobic culture isolated proteus mirabils  9/21/22 - Proteus mirabilis and B. faecis - Unasyn  9/22/22 - 6 weeks of Rocephin and Flagyl per Dr. Veliz  9/24/22 Vascular surgery was unable to complete the angiogram yesterday d/t refusing it. Will await further recs by Vascular surgery. Continue treatment with IV ABX  9/25/22 Ortho is recommending a BKA per vascular surgery. Awaiting Vascular surgery recs. Continue current management with IV ABX.   9/26/22 The patient is alert and oriented today. He reports that he does not remember refusing the angiogram on 9/23/22. He reports that he is agreeable to the procedure. Vascular Surgery was reconsulted today. Ortho is following. Continue IV ABX. Wound vac is in place.   9/27/22 Vascular surgery reconsulted and plans to do angiogram on 9/29/22 to evaluate for reperfusion vs amputation. Ortho is following.   9/28/22 Vascular surgery plans for a LLE angiogram in AM. NPO after MN. Ortho following   9/29/22 The patients renal function improved with IVF's. Plans for angiogram of LLE today with Vascular surgery to determine if revascularization is possible or if the patient will need a BKA. Will consult PT/OT for recs to assist with placement.   10/1/22 patient febrile ON, septic workup ordered  10/2: BC: NGTD, Afebrile overnight  10/4- BKA planned for 10-6, CM working on  placement following BKA

## 2022-10-04 NOTE — PLAN OF CARE
Ongoing (interventions implemented as appropriate)  Pt AAO x4.  VSS  Pt able to make needs known.  Pt remained afebrile throughout this shift.   Pt in bed   Pt remained free of falls this shift.   Pt denies pain this shift.  Plan of care reviewed. Patient verbalizes understanding.   Pt moving/turing independent. Frequent weight shifting encouraged.  Patient sinus rhythm on monitor.   Bed low, side rails up x 2, wheels locked, call light in reach.   Hourly rounding completed.   Will continue to monitor.

## 2022-10-04 NOTE — PLAN OF CARE
Problem: Adult Inpatient Plan of Care  Goal: Plan of Care Review  Outcome: Ongoing, Progressing  Goal: Patient-Specific Goal (Individualized)  Outcome: Ongoing, Progressing  Goal: Absence of Hospital-Acquired Illness or Injury  Outcome: Ongoing, Progressing  Goal: Optimal Comfort and Wellbeing  Outcome: Ongoing, Progressing  Goal: Readiness for Transition of Care  Outcome: Ongoing, Progressing     Problem: Infection  Goal: Absence of Infection Signs and Symptoms  Outcome: Ongoing, Progressing     Problem: Diabetes Comorbidity  Goal: Blood Glucose Level Within Targeted Range  Outcome: Ongoing, Progressing     Problem: Impaired Wound Healing  Goal: Optimal Wound Healing  Outcome: Ongoing, Progressing     Problem: Fall Injury Risk  Goal: Absence of Fall and Fall-Related Injury  Outcome: Ongoing, Progressing     Problem: Skin Injury Risk Increased  Goal: Skin Health and Integrity  Outcome: Ongoing, Progressing

## 2022-10-04 NOTE — PLAN OF CARE
Decision for amputation made.  Patient will have it this week. Spoke to [facility].  Requested [employer] request for authorization be held.       10/04/22 1201   Post-Acute Status   Post-Acute Placement Status Pending medical clearance/testing   Discharge Plan   Discharge Plan A Skilled Nursing Facility

## 2022-10-05 NOTE — ASSESSMENT & PLAN NOTE
Patient's FSGs are uncontrolled due to hyperglycemia on current medication regimen.  Last A1c reviewed-   Lab Results   Component Value Date    HGBA1C 7.4 (H) 08/04/2022     Most recent fingerstick glucose reviewed-   Recent Labs   Lab 10/04/22  1603 10/04/22  2021 10/05/22  0424 10/05/22  1108   POCTGLUCOSE 124* 127* 114* 139*     Current correctional scale  Low  Maintain anti-hyperglycemic dose as follows-   Antihyperglycemics (From admission, onward)    Start     Stop Route Frequency Ordered    09/26/22 0102  insulin aspart U-100 pen 0-5 Units         -- SubQ Before meals & nightly PRN 09/26/22 0102        Levemir added- changed to bid dosing and moderate sliding scale - dose increased from 16 units to 30 Units  Hold Oral hypoglycemics while patient is in the hospital.  9/24/22 The patient had a rapid response called over night. His blood glucose dropped to <20. He was given an amp of D50 and he returned to baseline.   10/05/2022   Stable
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
## 2022-10-05 NOTE — ASSESSMENT & PLAN NOTE
-Orthopedic Surgery following   -IV antibiotics  - Rocephin and Flagyl x 6 weeks  Proteus Mirabilis and B. faecis   -NWB LLE  DVT prophylaxis 24 hours after surgery   -Post op removal of the external fixator and debridement of osteomyelitis   -analgesia as needed   Per Ortho -  He just needs to be set up with home health for wound checks and may follow-up with ortho next week for a recheck ( pt to return to [facility])  --10/2: Plan for Left BKA this week due to minimal improvement and continued systemic symptoms of infection

## 2022-10-05 NOTE — ANESTHESIA PREPROCEDURE EVALUATION
10/05/2022  Lavelle Ladd is a 67 y.o., male.    Patient Active Problem List   Diagnosis    Renal hypertension    GERD (gastroesophageal reflux disease)    Peptic ulcer disease    Malignant HTN with heart disease, w/o CHF, with chronic kidney disease    Essential hypertension    Gastroparesis     donor kidney transplant for DM 16    Prophylactic immunotherapy    Adverse effect of glucocorticoids and synthetic analogues, sequela    Osteoarthritis of lumbar spine    Osteoarthritis of thoracic spine with myelopathy    Type 2 diabetes mellitus with hyperglycemia, with long-term current use of insulin    Secondary hyperparathyroidism of renal origin    Coronary artery disease of native artery of native heart with stable angina pectoris    Hyperlipidemia    Chronic obstructive pulmonary disease    Ulnar neuropathy    Reactive airway disease    Renal transplant, status post    Type 2 diabetes mellitus with retinopathy, with long-term current use of insulin    Type 2 diabetes mellitus with stable proliferative retinopathy of both eyes, with long-term current use of insulin    Cavitary lesion of lung    Opacity of lung on imaging study    Bacteremia due to Gram-negative bacteria    ESBL (extended spectrum beta-lactamase) producing bacteria infection    History of hepatitis C, s/p successful Rx w/ SVR12 - 2017    Kidney transplant recipient    Intractable vomiting with nausea    Chronic bilateral low back pain with left-sided sciatica    Diabetic polyneuropathy    Other chest pain    Disc disease, degenerative, lumbar or lumbosacral    Numbness and tingling    Adjustment insomnia    Lumbar stenosis with neurogenic claudication    Acute lumbar radiculopathy    Chronic lumbar radiculopathy    Long term current use of immunosuppressive drug    Peripheral vascular  disease    Diabetic foot infection    Failure to thrive in adult    Fracture of one rib of right side    MSSA bacteremia    Abscess of left foot    S/P transmetatarsal amputation of foot, left    Non compliance with medical treatment    Hx of right BKA    Immunosuppression    Encounter for medication review and counseling    Stage 3 chronic kidney disease    Osteomyelitis    Chest pain    CAD in native artery    CRI (chronic renal insufficiency)    Squamous cell carcinoma of skin of face    Hypotension due to hypovolemia    Squamous cell carcinoma, scalp/neck    Squamous cell carcinoma of skin of scalp and neck    CAMARA (dyspnea on exertion)    Atypical chest pain    Old MI (myocardial infarction)    Mobitz type 1 second degree atrioventricular block    Pneumonia of left lower lobe due to infectious organism    MARIA INES (obstructive sleep apnea)    Central sleep apnea comorbid with prescribed opioid use    Central sleep apnea secondary to congestive heart failure (CHF)    Behaviorally induced insufficient sleep syndrome    Inadequate sleep hygiene    Severe central sleep apnea comorbid with prescribed opioid use    Periodic limb movement disorder (PLMD)    Opioid withdrawal    Diabetic ulcer of left midfoot associated with type 2 diabetes mellitus, with fat layer exposed    Near syncope    Closed fracture of left tibia and fibula    Pressure injury of left hip, stage 2    Acute hypoxemic respiratory failure and bilateral pneumonia due to COVID-19    Acute osteomyelitis of left calcaneus    Infection of deep incisional surgical site after procedure    Peripheral artery disease    Electrolyte abnormality       Anesthesia Evaluation    I have reviewed the Patient Summary Reports.    I have reviewed the Nursing Notes. I have reviewed the NPO Status.   I have reviewed the Medications.     Review of Systems  Anesthesia Hx:  No problems with previous Anesthesia  History of prior surgery of  interest to airway management or planning: Denies Family Hx of Anesthesia complications.   Denies Personal Hx of Anesthesia complications.   Social:  Former Smoker    Hematology/Oncology:         -- Anemia: Hematology Comments: Hyponatremia   Cardiovascular:   Hypertension Past MI CAD  Dysrhythmias  CHF PVD hyperlipidemia CAMARA ECG has been reviewed. ECHO (8/4/22):  ? The left ventricle is normal in size with moderate concentric hypertrophy and normal systolic function.  ? The estimated ejection fraction is 60%.  ? Normal left ventricular diastolic function.  ? Normal right ventricular size with normal right ventricular systolic function.  ? Mild tricuspid regurgitation.  ? Normal central venous pressure (3 mmHg).  ? The estimated PA systolic pressure is 29 mmHg.    Aortoiliac imaging (9/'22):  Patient was severe tibial vascular disease with no reconstructible vessel were identified, no suitable distal vessels for target revascularization with surgical bypass were identified either.  At this point would recommend continued antibiotics and wound care and should the patients wounds continued to deteriorate would recommend amputation and would defer this to orthopedics   Pulmonary:   Pneumonia COPD Shortness of breath Sleep Apnea    Renal/:   Chronic Renal Disease, ESRD, CRI Previously HD dependent - S/p renal transplant 2 years ago   Hepatic/GI:   PUD, GERD Liver Disease, Hepatitis, C    Musculoskeletal:   Arthritis  Osteoarthritis of lumbar spine  Osteoarthritis of thoracic spine with myelopathy     Neurological:   Neuromuscular Disease,    Endocrine:   Diabetes  Obesity / BMI > 30      Physical Exam  General:  Well nourished      Airway/Jaw/Neck:  Airway Findings: General Airway Assessment: Adult Jaw/Neck Findings:      Dental:  Dental Findings:        Mental Status:  Mental Status Findings:  Cooperative, Alert and Oriented         Anesthesia Plan  Type of Anesthesia, risks & benefits discussed:  Anesthesia Type:   general    Patient's Preference:   Plan Factors:          Intra-op Monitoring Plan: standard ASA monitors  Intra-op Monitoring Plan Comments:   Post Op Pain Control Plan: multimodal analgesia and per primary service following discharge from PACU  Post Op Pain Control Plan Comments:     Induction:   IV  Beta Blocker:         Informed Consent: Informed consent signed with the Patient and all parties understand the risks and agree with anesthesia plan.  All questions answered.  Anesthesia consent signed with patient.  ASA Score: 3     Day of Surgery Review of History & Physical:  There are no significant changes.      Anesthesia Plan Notes: Patient is DNR        Ready For Surgery From Anesthesia Perspective.       Past Medical History:   Diagnosis Date    DINORAH (acute kidney injury) 2016    Arthritis     CAD in native artery 2019    CHF (congestive heart failure)     Chronic obstructive pulmonary disease 2016    Coronary artery disease involving native coronary artery of native heart without angina pectoris 2016    CRI (chronic renal insufficiency) 2019     donor kidney transplant for DM 16     Induction with Thymo x3 and IV solumedrol to total 875mg  Kidney Biopsy  2016: 16 glomeruli, ACR type 1 AVR type 2, significant microcirculatory changes, c4d negative, No DSA, 5 to10% fibrosis. Treated with thymo x8 2016- no rejection      Diastolic heart failure     Encounter for blood transfusion     ESRD on RRT since 10/2013 10/29/2013    Biopsy proven diabetic nephropathy and lymphoplasmacytic interstitial infiltrate not c/w with AIN (ddx sjogrens or assoc with tamm-horsefall protein extravasation)     GERD (gastroesophageal reflux disease)     History of hepatitis C, s/p successful Rx w/ SVR12 - 2017    Completed 12 weeks harvoni w/ SVR    Hyperlipidemia     Hypertension     PAD (peripheral artery disease) 2019    PIC line (peripherally inserted  central catheter) flush     Prophylactic immunotherapy     Proteinuria     PVD (peripheral vascular disease) 6/26/2017    RLE BKA CT 12/11/16 Extensive atherosclerotic disease of the aorta and mesenteric arteries.     Renal hypertension     Type 2 diabetes mellitus with diabetic neuropathy, with long-term current use of insulin 12/1/2016    Vitamin B12 deficiency        Past Surgical History:   Procedure Laterality Date    ANGIOGRAPHY OF LOWER EXTREMITY N/A 9/23/2022    Procedure: ANGIOGRAM, LOWER EXTREMITY/left leg;  Surgeon: Donal Mcdonald MD;  Location: [facility] CATH LAB;  Service: Cardiovascular;  Laterality: N/A;    ANGIOGRAPHY OF LOWER EXTREMITY N/A 9/29/2022    Procedure: ANGIOGRAM, LOWER EXTREMITY/left leg;  Surgeon: Donal Mcdonald MD;  Location: [facility] CATH LAB;  Service: Peripheral Vascular;  Laterality: N/A;  resched from 9/23 pt ready now    AORTOGRAPHY WITH SERIALOGRAPHY N/A 6/14/2018    Procedure: LEFT LEG ANGIOGRAM;  Surgeon: Donal Mcdonald MD;  Location: [facility] CATH LAB;  Service: Vascular;  Laterality: N/A;    APPLICATION OF LARGE EXTERNAL FIXATION DEVICE TO TIBIA Left 8/4/2022    Procedure: APPLICATION, EXTERNAL FIXATION DEVICE, LARGE, TIBIA;  Surgeon: Good Villa MD;  Location: [facility] OR;  Service: Orthopedics;  Laterality: Left;    av bovine graft      Left UE    AV FISTULA PLACEMENT      left UE    CARDIAC CATHETERIZATION  02/2015    CLOSED REDUCTION OF INJURY OF TIBIA Left 8/4/2022    Procedure: CLOSED REDUCTION, TIBIA;  Surgeon: Good Villa MD;  Location: [facility] OR;  Service: Orthopedics;  Laterality: Left;  Closed reduction left tibial and fibular shaft fractures    CLOSURE OF WOUND Left 9/24/2018    Procedure: CLOSURE, WOUND;  Surgeon: Karla Wheeler DPM;  Location: [facility] OR;  Service: Podiatry;  Laterality: Left;  Secondary Wound closure, extensive    CLOSURE OF WOUND Left 11/5/2018    Procedure: CLOSURE, WOUND;  Surgeon: Karla Wheeler DPM;  Location: [facility] OR;  Service:  Podiatry;  Laterality: Left;    DEBRIDEMENT OF MULTIPLE METATARSAL BONES Left 11/5/2018    Procedure: DEBRIDEMENT, METATARSAL BONE, 2 OR MORE BONES;  Surgeon: Karla Wheeler DPM;  Location: [facility] OR;  Service: Podiatry;  Laterality: Left;    EXCISION OF SKIN Left 9/27/2019    Procedure: EXCISION, SKIN;  Surgeon: Lenard Alarcon MD;  Location: [address];  Service: Plastics;  Laterality: Left;  Plastics set, NIMS monitor, [employer]    FOOT AMPUTATION THROUGH METATARSAL Left 9/21/2018    Procedure: AMPUTATION, FOOT, TRANSMETATARSAL;  Surgeon: Karla Wheeler DPM;  Location: [facility] OR;  Service: Podiatry;  Laterality: Left;    FOOT AMPUTATION THROUGH METATARSAL Left 10/31/2018    Procedure: AMPUTATION, FOOT, TRANSMETATARSAL;  Surgeon: Karla Wheeler DPM;  Location: [facility] OR;  Service: Podiatry;  Laterality: Left;    FOOT AMPUTATION THROUGH METATARSAL Left 11/5/2018    Procedure: AMPUTATION, FOOT, TRANSMETATARSAL;  Surgeon: Karla Wheeler DPM;  Location: [facility] OR;  Service: Podiatry;  Laterality: Left;  revisional transmetatarsal amputation, Left foot    IRRIGATION AND DEBRIDEMENT OF LOWER EXTREMITY Left 10/31/2018    Procedure: IRRIGATION AND DEBRIDEMENT, LOWER EXTREMITY;  Surgeon: Karla Wheeler DPM;  Location: [facility] OR;  Service: Podiatry;  Laterality: Left;    KIDNEY TRANSPLANT  05/21/2016    LEFT HEART CATHETERIZATION Left 7/21/2019    Procedure: CATHETERIZATION, HEART, LEFT;  Surgeon: Andrew Valdez MD;  Location: [facility] CATH LAB;  Service: Cardiology;  Laterality: Left;    LEG AMPUTATION THROUGH KNEE  2011    right LE, started as nail puncture leading to diabetic ulcer    REMOVAL OF EXTERNAL FIXATION DEVICE Left 9/17/2022    Procedure: REMOVAL, EXTERNAL FIXATION DEVICE;  Surgeon: Kathleen Zazueta MD;  Location: [facility] OR;  Service: Orthopedics;  Laterality: Left;    SKIN FULL THICKNESS GRAFT Left 10/7/2019    Procedure: APPLICATION, GRAFT, SKIN, FULL-THICKNESS;  Surgeon: Lenard Alarcon,  MD;  Location: [facility] OR [address] [place] [address];  Service: Plastics;  Laterality: Left;    SURGICAL REMOVAL OF LESION OF FACE Right 10/7/2019    Procedure: EXCISION, LESION, FACE;  Surgeon: Lenard Alarcon MD;  Location: [facility] OR [address] [place] [address];  Service: Plastics;  Laterality: Right;       Family History   Problem Relation Age of Onset    Cancer Father     Diabetes Father     Heart failure Father     Stroke Father     Heart failure Mother     Kidney disease Neg Hx        Social History     Socioeconomic History    Marital status: Single   Occupational History    Occupation: Disabled     Employer: DISABLED   Tobacco Use    Smoking status: Former     Packs/day: 1.00     Years: 40.00     Pack years: 40.00     Types: Cigarettes     Quit date: 2013     Years since quittin.7    Smokeless tobacco: Never   Substance and Sexual Activity    Alcohol use: Yes     Alcohol/week: 6.0 standard drinks     Types: 6 Cans of beer per week     Comment: seldom    Drug use: No    Sexual activity: Never   Social History Narrative    Lives alone. Retired from construction and various jobs, now retired. Would like to return to work PT to alleviate boredom.             Physical Exam  General: Well nourished          Anesthesia Plan  Type of Anesthesia, risks & benefits discussed:    Anesthesia Type: general  Intra-op Monitoring Plan: standard ASA monitors  Post Op Pain Control Plan: multimodal analgesia and per primary service following discharge from PACU  Induction:  IV  Informed Consent: Informed consent signed with the Patient and all parties understand the risks and agree with anesthesia plan.  All questions answered.   ASA Score: 3  Anesthesia Plan Notes: Patient is DNR    Ready For Surgery From Anesthesia Perspective.       .      Chemistry        Component Value Date/Time     (L) 10/03/2022 0648    K 5.0 10/03/2022 0648     10/03/2022 0648    CO2 15 (L) 10/03/2022 0648    BUN 12 10/03/2022 0648    CREATININE 1.1 10/03/2022  0648     (H) 10/03/2022 0648        Component Value Date/Time    CALCIUM 8.6 (L) 10/03/2022 0648    ALKPHOS 132 10/03/2022 0648    AST 16 10/03/2022 0648    ALT 7 (L) 10/03/2022 0648    BILITOT 0.3 10/03/2022 0648    ESTGFRAFRICA 47 (A) 08/15/2021 1831    EGFRNONAA 41 (A) 08/15/2021 1831        Lab Results   Component Value Date    WBC 4.05 10/03/2022    HGB 9.5 (L) 10/03/2022    HCT 31.7 (L) 10/03/2022    MCV 92 10/03/2022     10/03/2022     Sinus rhythm with 1st degree A-V block   Low voltage QRS   Prolonged QT   Abnormal ECG   When compared with ECG of 30-AUG-2022 15:01,   No significant change was found   Confirmed by NAA DOWNEY MD ([id number]) on 8/31/2022 4:21:12 PM     Echo 8/4/22:   The left ventricle is normal in size with moderate concentric hypertrophy and normal systolic function.   The estimated ejection fraction is 60%.   Normal left ventricular diastolic function.   Normal right ventricular size with normal right ventricular systolic function.   Mild tricuspid regurgitation.   Normal central venous pressure (3 mmHg).   The estimated PA systolic pressure is 29 mmHg.           Heart Cath 7/2/19:  LVEDP (Pre): 10 mmHg   LVEDP (Post): 10 mmHg   Ejection Fraction: 60%   Wall Motion: akinetic in the apical wall and severely hypokinetic in the inferoapical wall   No significant MR noted   No significant gradient noted on pullback from LV    Angiographic Results       Diagnostic:          Patient has a right dominant coronary artery.        - Left Main Coronary Artery:              The LM has luminal irregularities. There is LISBETH 3 flow.        - Left Anterior Descending Artery:              The proximal LAD has a 50% stenosis. There is LISBETH 3 flow.                      Lesion Details:  Heavy calcification.              The mid LAD has a 50% stenosis. There is LISBETH 3 flow.                      Lesion Details:  Heavy calcification.              The distal LAD. There is LISBETH 0 flow in apical  segment.  Apical LAD is occluded (wrap around LAD based on review of old angiogram films).  Distal LAD before apical occlusion is tortuous and small with diffuse disease.          - D1:              The D1 has luminal irregularities. There is LISBETH 3 flow.        - Left Circumflex Artery:              The LCX has luminal irregularities. There is LISBETH 3 flow.      - OM1:              The OM1 is normal. There is LISBETH 3 flow.      - Ramus:              The ramus has luminal irregularities. There is LISBETH 3 flow.          - Right Coronary Artery:              The RCA. There is LISBETH 3 flow. RCA is very tortuous and has diffuse nonobstructive disease.        - Right Common Iliac Artery:              The right BROOKE was not studied.      - Right Internal Iliac Artery:              The right IIA was not studied.      - Right External Iliac Artery:              The right EIA was not studied.      - Right Common Femoral Artery:              The right CFA has a 30% stenosis. The remaining portion of the vessel is diffusely diseased (75).                      Lesion Details:  Heavy calcification.      - Right Superficial Femoral Artery:              The ostial right SFA is occluded (100). There is LISBETH 0 flow.                      Lesion Details:  Heavy calcification.      - Right Profunda Femoral Artery:              The right PFA is diffusely diseased (75).                      Lesion Details:  Heavy calcification.

## 2022-10-05 NOTE — PLAN OF CARE
Ongoing (interventions implemented as appropriate)  Pt AAO x4.  VSS  Pt able to make needs known.  Pt remained afebrile throughout this shift.   Pt is bedbound  Pt remained free of falls this shift.   Pt denies pain this shift.  Plan of care reviewed. Patient verbalizes understanding.   Pt moving/turing independent in bed. Frequent weight shifting encouraged.  Patient sinus rhythm on monitor.   Bed low, side rails up x 2, wheels locked, call light in reach.   Hourly rounding completed.   Will continue to monitor.
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
## 2022-10-05 NOTE — SUBJECTIVE & OBJECTIVE
Interval History: BKA tomorrow, PT/OT eval needed for CM to proceed with SNF placement    Review of Systems   Constitutional:  Positive for activity change and fatigue. Negative for appetite change, chills, diaphoresis, fever and unexpected weight change.   HENT:  Negative for congestion, hearing loss, nosebleeds, postnasal drip, rhinorrhea and trouble swallowing.    Eyes:  Negative for discharge and visual disturbance.   Respiratory:  Negative for cough, chest tightness and shortness of breath.    Cardiovascular:  Negative for chest pain, palpitations and leg swelling.   Gastrointestinal:  Positive for constipation. Negative for abdominal distention, abdominal pain, diarrhea, nausea and vomiting.   Endocrine: Negative for cold intolerance and heat intolerance.   Genitourinary:  Negative for difficulty urinating, dysuria, frequency and hematuria.   Musculoskeletal:  Positive for back pain, gait problem and joint swelling. Negative for arthralgias and myalgias.   Skin:  Positive for wound.   Neurological:  Negative for dizziness, weakness, light-headedness and headaches.   Hematological:  Negative for adenopathy. Does not bruise/bleed easily.   Psychiatric/Behavioral:  Positive for agitation. Negative for behavioral problems and confusion. The patient is not nervous/anxious.    Objective:     Vital Signs (Most Recent):  Temp: 97.8 °F (36.6 °C) (10/05/22 1105)  Pulse: 73 (10/05/22 1105)  Resp: 18 (10/05/22 1105)  BP: (!) 112/51 (10/05/22 1105)  SpO2: 97 % (10/05/22 1105)   Vital Signs (24h Range):  Temp:  [97.1 °F (36.2 °C)-98.4 °F (36.9 °C)] 97.8 °F (36.6 °C)  Pulse:  [73-82] 73  Resp:  [16-20] 18  SpO2:  [95 %-99 %] 97 %  BP: (101-132)/(51-67) 112/51     Weight: 93.2 kg (205 lb 7.5 oz)  Body mass index is 28.66 kg/m².    Intake/Output Summary (Last 24 hours) at 10/5/2022 1221  Last data filed at 10/5/2022 1000  Gross per 24 hour   Intake --   Output 100 ml   Net -100 ml      Physical Exam  Vitals and nursing note  reviewed.   Constitutional:       General: He is awake.      Appearance: He is ill-appearing.   HENT:      Head: Normocephalic and atraumatic.   Cardiovascular:      Rate and Rhythm: Normal rate.   Pulmonary:      Effort: Pulmonary effort is normal. No accessory muscle usage or respiratory distress.   Abdominal:      General: Abdomen is protuberant. There is distension.   Musculoskeletal:         General: Normal range of motion.      Cervical back: Normal range of motion.   Skin:     Comments: Wound vac to the left foot with walking boot in place   Neurological:      Mental Status: He is alert and oriented to person, place, and time.   Psychiatric:         Attention and Perception: He is inattentive.         Mood and Affect: Affect is angry.         Behavior: Behavior is agitated and aggressive.      Comments: Hostile, Argumentative       Significant Labs: All pertinent labs within the past 24 hours have been reviewed.  CBC: No results for input(s): WBC, HGB, HCT, PLT in the last 48 hours.  CMP: No results for input(s): NA, K, CL, CO2, GLU, BUN, CREATININE, CALCIUM, PROT, ALBUMIN, BILITOT, ALKPHOS, AST, ALT, ANIONGAP, EGFRNONAA in the last 48 hours.    Invalid input(s): ESTGFAFR[place]    Significant Imaging: I have reviewed all pertinent imaging results/findings within the past 24 hours.

## 2022-10-05 NOTE — ASSESSMENT & PLAN NOTE
9/14/22: c/o of left ankle pain-controlled. Pt was scheduled for surgery, however, surgery was post-poned 2/2 hyponatremia -Na 126. WBC normal, afebrile. Blood cultures show NGTD. .3. cont IV Abx  09/15/22- removal of the external fixator and debridement of osteomyelitis planned for today- procedure postponed due to hyponatremia- Lasix given - Nephrology following   9/16/22 - procedure postponed due to hyperglycemia  9/17/22 - post op day 1 - ABX in progress  9/19/22 - post op day 3 - Cefepime continued, Zyvox stopped. NWB. Wound care consulted for wound vac changes  9/20/22 - bone cultures pending. Aerobic culture isolated proteus mirabils  9/21/22 - Proteus mirabilis and B. faecis - Unasyn  9/22/22 - 6 weeks of Rocephin and Flagyl per Dr. Veliz  9/24/22 Vascular surgery was unable to complete the angiogram yesterday d/t refusing it. Will await further recs by Vascular surgery. Continue treatment with IV ABX  9/25/22 Ortho is recommending a BKA per vascular surgery. Awaiting Vascular surgery recs. Continue current management with IV ABX.   9/26/22 The patient is alert and oriented today. He reports that he does not remember refusing the angiogram on 9/23/22. He reports that he is agreeable to the procedure. Vascular Surgery was reconsulted today. Ortho is following. Continue IV ABX. Wound vac is in place.   9/27/22 Vascular surgery reconsulted and plans to do angiogram on 9/29/22 to evaluate for reperfusion vs amputation. Ortho is following.   9/28/22 Vascular surgery plans for a LLE angiogram in AM. NPO after MN. Ortho following   9/29/22 The patients renal function improved with IVF's. Plans for angiogram of LLE today with Vascular surgery to determine if revascularization is possible or if the patient will need a BKA. Will consult PT/OT for recs to assist with placement.   10/1/22 patient febrile ON, septic workup ordered  10/2: BC: NGTD, Afebrile overnight  10/4- BKA planned for 10-6, CM working on  placement following BKA  10/05/2022 NPO after midnight, d/c heparin after midnight

## 2022-10-06 PROBLEM — I95.9 HYPOTENSION: Status: ACTIVE | Noted: 2022-01-01

## 2022-10-06 PROBLEM — Z89.512 S/P BKA (BELOW KNEE AMPUTATION), LEFT: Status: ACTIVE | Noted: 2022-01-01

## 2022-10-06 NOTE — ASSESSMENT & PLAN NOTE
Patient's FSGs are uncontrolled due to hyperglycemia on current medication regimen.  Last A1c reviewed-   Lab Results   Component Value Date    HGBA1C 7.4 (H) 08/04/2022     Most recent fingerstick glucose reviewed-   Recent Labs   Lab 10/06/22  0445 10/06/22  1111 10/06/22  1630 10/06/22  1651   POCTGLUCOSE 115* 113* 316* 171*     Current correctional scale  Low  Maintain anti-hyperglycemic dose as follows-   Antihyperglycemics (From admission, onward)    Start     Stop Route Frequency Ordered    09/26/22 0102  insulin aspart U-100 pen 0-5 Units         -- SubQ Before meals & nightly PRN 09/26/22 0102        Levemir added- changed to bid dosing and moderate sliding scale - dose increased from 16 units to 30 Units  Hold Oral hypoglycemics while patient is in the hospital.  9/24/22 The patient had a rapid response called over night. His blood glucose dropped to <20. He was given an amp of D50 and he returned to baseline.   10/06/2022   Stable

## 2022-10-06 NOTE — PLAN OF CARE
Problem: Adult Inpatient Plan of Care  Goal: Absence of Hospital-Acquired Illness or Injury  Outcome: Ongoing, Progressing     Problem: Adult Inpatient Plan of Care  Goal: Absence of Hospital-Acquired Illness or Injury  Intervention: Identify and Manage Fall Risk  Flowsheets (Taken 10/6/2022 0258)  Safety Promotion/Fall Prevention:   assistive device/personal item within reach   bed alarm set   Fall Risk reviewed with patient/family   instructed to call staff for mobility   side rails raised x 3     Problem: Diabetes Comorbidity  Goal: Blood Glucose Level Within Targeted Range  Outcome: Ongoing, Progressing     Problem: Impaired Wound Healing  Goal: Optimal Wound Healing  Intervention: Promote Wound Healing  10/6/2022 0301 by Catracho Segal RN  Flowsheets (Taken 10/6/2022 0301)  Activity Management: Patient unable to perform activities  Pain Management Interventions: position adjusted     Problem: Fall Injury Risk  Goal: Absence of Fall and Fall-Related Injury  Intervention: Promote Injury-Free Environment  Flowsheets (Taken 10/6/2022 0301)  Safety Promotion/Fall Prevention:   bed alarm set   assistive device/personal item within reach   instructed to call staff for mobility   side rails raised x 2

## 2022-10-06 NOTE — OP NOTE
Date of service 10/06/2022  Procedure: Left below-knee amputation  Attending surgeon:  Dr. Donal Mcdonald  1st assist JUVENAL Del Toro- no student/fellow available  Anesthesia:  General  Specimens: Left leg  Complications: None  EBL:  450 cc  Medications given: None    Indication:  67-year-old male with history of left lower extremity fracture non revascularizable vascular disease now with progressive gangrene of the left foot presents now for the above  Procedure    Procedure: After discussion with the patient regarding risks benefits potential complications he agreed and signed informed consent.  After adequate preoperative evaluation performed by Selena purcell assist the patient was brought to the operative suite placed in supine position.  After adequate general anesthesia provided by the anesthesia team we prepped and draped the patient's left lower extremity in the standard surgical fashion.  I then exsanguinated the limb using Esmarch tourniquet and then inflated inclusive tourniquet in the upper thigh.  At this point I turned my attention to below-the-knee were made a circumferential incision approximately 4 fingerbreadths below the tibial tuberosity.  The incision was carried through skin subcutaneous tissue.  The investing fascia the leg was incised using electrocautery.  JUVENAL Del Toro for assist proceeded to transect the all further soft tissues including muscle tendon ligament cartilage.  The tibial vessels were identified clamped in sequence transected the proximal ends were oversewn using heavy silk ligatures.  The tibia and fibula were cleaned off using a periosteal elevator.  The bones were cut using a reticulating saw.  The lower leg was then passed off the field as specimen.  At this point all wounds were irrigated all further nonviable soft tissue was sharply excised all further bleeding vessels were ligated with silk ligatures were oversewn.  The wound was irrigated hemostasis was  again achieved we then closed the posterior flap to the anterior flap in layers using Vicry and skin staples.  Sterile dressings were applied.  Patient was awoke from anesthesia tolerated procedure and transferred recovery room stable signs

## 2022-10-06 NOTE — PLAN OF CARE
Patient does not want to return to [facility].  Referrals sent to [facility], [facility] and [facility].   [facility] declined.  [facility] accepted.  Advised patient's friend Kecia.       10/06/22 1516   Post-Acute Status   Post-Acute Authorization Placement   Post-Acute Placement Status Pending medical clearance/testing   Discharge Plan   Discharge Plan A Skilled Nursing Facility

## 2022-10-06 NOTE — CONSULTS
[facility] - Intensive Care (Hospital)  Critical Care Medicine  Consult Note    Patient Name: Lavelle Ladd  MRN: [id number]  Admission Date: 9/13/2022  Hospital Length of Stay: 22 days  Code Status: DNR  Attending Physician: Donal Mcdonald MD   Primary Care Provider: Primary Doctor No   Principal Problem: Acute osteomyelitis of left calcaneus    Inpatient consult to Critical Care Medicine  Consult performed by: Jose Arcos MD  Consult ordered by: Donal Mcdonald MD      Subjective:     HPI:  68 yo male with HTN, CAD, DM, PVD, kidney transplant 2016-now with CKD3, COPD, Right BKA, Left transmetatarsal amputation, and recent Closed Fracture pf left tibia/fibula s/p closed reduction left tibial and fibular shaft fractures with application of external fixation device on 8/1/22 who was admitted with Left ankle wound infection at ext-fix pin site with calcaneus osteomyelitis on IV Vanc and Cefepime    9/17:   OR and Ex-fix hardware removed.  Antibiotic beads and wound vac placed.  Fluoro revealed unhealed fractures.  Was likely to need amputation.    Over next several days, was attempted to be tuned up from renal perspective.      9/21/22 - Arterial studies to RLE noted with hemodynamically significant stenosis suspected within the proximal superficial femoral artery. Vascular consulted for possible angiography.    9/23: Pt refused angiogram.    9/29: pt ultimately underwent angiogram and no targets for revascularization by endovascular or bypass identified.    Ortho ultimately recommended BKA.    10/6:  Pt underwent LLE BKA today.  While in PACU was hypotensive and now transferred to ICU.  BP now improved.  PT awake and interactive.    On arrival in ICU, pt is somewhat irriatble and not the best historian.  He states earlier he had abdominal discomfort with some nausea however that has since improved.  He specifically denies any fevers, sweats, chills.    Hospital/ICU Course:  10/6:  Hypotensive with SBP in 60s in PACU.   Now transferred to ICU.    Past Medical History:   Diagnosis Date    DINORAH (acute kidney injury) 2016    Arthritis     CAD in native artery 2019    CHF (congestive heart failure)     Chronic obstructive pulmonary disease 2016    Coronary artery disease involving native coronary artery of native heart without angina pectoris 2016    CRI (chronic renal insufficiency) 2019     donor kidney transplant for DM 16     Induction with Thymo x3 and IV solumedrol to total 875mg  Kidney Biopsy  2016: 16 glomeruli, ACR type 1 AVR type 2, significant microcirculatory changes, c4d negative, No DSA, 5 to10% fibrosis. Treated with thymo x8 2016- no rejection      Diastolic heart failure     Encounter for blood transfusion     ESRD on RRT since 10/2013 10/29/2013    Biopsy proven diabetic nephropathy and lymphoplasmacytic interstitial infiltrate not c/w with AIN (ddx sjogrens or assoc with tamm-horsefall protein extravasation)     GERD (gastroesophageal reflux disease)     History of hepatitis C, s/p successful Rx w/ SVR12 - 2017    Completed 12 weeks harvoni w/ SVR    Hyperlipidemia     Hypertension     PAD (peripheral artery disease) 2019    PIC line (peripherally inserted central catheter) flush     Prophylactic immunotherapy     Proteinuria     PVD (peripheral vascular disease) 2017    RLE BKA CT 16 Extensive atherosclerotic disease of the aorta and mesenteric arteries.     Renal hypertension     Type 2 diabetes mellitus with diabetic neuropathy, with long-term current use of insulin 2016    Vitamin B12 deficiency        Past Surgical History:   Procedure Laterality Date    ANGIOGRAPHY OF LOWER EXTREMITY N/A 2022    Procedure: ANGIOGRAM, LOWER EXTREMITY/left leg;  Surgeon: Donal Mcdonald MD;  Location: [facility] CATH LAB;  Service: Cardiovascular;  Laterality: N/A;    ANGIOGRAPHY OF LOWER EXTREMITY N/A 2022    Procedure: ANGIOGRAM, LOWER  EXTREMITY/left leg;  Surgeon: Donal Mcdonald MD;  Location: [facility] CATH LAB;  Service: Peripheral Vascular;  Laterality: N/A;  resched from 9/23 pt ready now    AORTOGRAPHY WITH SERIALOGRAPHY N/A 6/14/2018    Procedure: LEFT LEG ANGIOGRAM;  Surgeon: Donal Mcodnald MD;  Location: [facility] CATH LAB;  Service: Vascular;  Laterality: N/A;    APPLICATION OF LARGE EXTERNAL FIXATION DEVICE TO TIBIA Left 8/4/2022    Procedure: APPLICATION, EXTERNAL FIXATION DEVICE, LARGE, TIBIA;  Surgeon: Good Villa MD;  Location: [facility];  Service: Orthopedics;  Laterality: Left;    av bovine graft      Left UE    AV FISTULA PLACEMENT      left UE    CARDIAC CATHETERIZATION  02/2015    CLOSED REDUCTION OF INJURY OF TIBIA Left 8/4/2022    Procedure: CLOSED REDUCTION, TIBIA;  Surgeon: Good Villa MD;  Location: [facility];  Service: Orthopedics;  Laterality: Left;  Closed reduction left tibial and fibular shaft fractures    CLOSURE OF WOUND Left 9/24/2018    Procedure: CLOSURE, WOUND;  Surgeon: Karla Wheeler DPM;  Location: [facility];  Service: Podiatry;  Laterality: Left;  Secondary Wound closure, extensive    CLOSURE OF WOUND Left 11/5/2018    Procedure: CLOSURE, WOUND;  Surgeon: Karla Wheeler DPM;  Location: [facility];  Service: Podiatry;  Laterality: Left;    DEBRIDEMENT OF MULTIPLE METATARSAL BONES Left 11/5/2018    Procedure: DEBRIDEMENT, METATARSAL BONE, 2 OR MORE BONES;  Surgeon: Karla Wheeler DPM;  Location: [facility] OR;  Service: Podiatry;  Laterality: Left;    EXCISION OF SKIN Left 9/27/2019    Procedure: EXCISION, SKIN;  Surgeon: Lenard Alarcon MD;  Location: [address];  Service: Plastics;  Laterality: Left;  Plastics set, NIMS monitor, ACell    FOOT AMPUTATION THROUGH METATARSAL Left 9/21/2018    Procedure: AMPUTATION, FOOT, TRANSMETATARSAL;  Surgeon: Karla Wheeler DPM;  Location: [facility];  Service: Podiatry;  Laterality: Left;    FOOT AMPUTATION THROUGH METATARSAL Left 10/31/2018    Procedure: AMPUTATION,  FOOT, TRANSMETATARSAL;  Surgeon: Karla Wheeler DPM;  Location: [facility] OR;  Service: Podiatry;  Laterality: Left;    FOOT AMPUTATION THROUGH METATARSAL Left 2018    Procedure: AMPUTATION, FOOT, TRANSMETATARSAL;  Surgeon: Karla Wheeler DPM;  Location: [facility] OR;  Service: Podiatry;  Laterality: Left;  revisional transmetatarsal amputation, Left foot    IRRIGATION AND DEBRIDEMENT OF LOWER EXTREMITY Left 10/31/2018    Procedure: IRRIGATION AND DEBRIDEMENT, LOWER EXTREMITY;  Surgeon: Karla Wheeler DPM;  Location: [facility] OR;  Service: Podiatry;  Laterality: Left;    KIDNEY TRANSPLANT  2016    LEFT HEART CATHETERIZATION Left 2019    Procedure: CATHETERIZATION, HEART, LEFT;  Surgeon: Andrew Valdez MD;  Location: [facility] CATH LAB;  Service: Cardiology;  Laterality: Left;    LEG AMPUTATION THROUGH KNEE      right LE, started as nail puncture leading to diabetic ulcer    REMOVAL OF EXTERNAL FIXATION DEVICE Left 2022    Procedure: REMOVAL, EXTERNAL FIXATION DEVICE;  Surgeon: Kathleen Zazueta MD;  Location: [facility] OR;  Service: Orthopedics;  Laterality: Left;    SKIN FULL THICKNESS GRAFT Left 10/7/2019    Procedure: APPLICATION, GRAFT, SKIN, FULL-THICKNESS;  Surgeon: Lenard Alarcon MD;  Location: [facility] OR [address] [place] [address];  Service: Plastics;  Laterality: Left;    SURGICAL REMOVAL OF LESION OF FACE Right 10/7/2019    Procedure: EXCISION, LESION, FACE;  Surgeon: Lenard Alarcon MD;  Location: [facility] OR 2ND FLR;  Service: Plastics;  Laterality: Right;       Review of patient's allergies indicates:  No Known Allergies    Family History       Problem Relation (Age of Onset)    Cancer Father    Diabetes Father    Heart failure Father, Mother    Stroke Father          Tobacco Use    Smoking status: Former     Packs/day: 1.00     Years: 40.00     Pack years: 40.00     Types: Cigarettes     Quit date: 2013     Years since quittin.7    Smokeless tobacco: Never   Substance and Sexual Activity    Alcohol use:  Yes     Alcohol/week: 6.0 standard drinks     Types: 6 Cans of beer per week     Comment: seldom    Drug use: No    Sexual activity: Never         Review of Systems   Constitutional:  Negative for chills, fatigue and fever.   Respiratory:  Negative for cough, shortness of breath and wheezing.    Gastrointestinal:  Positive for abdominal pain and nausea. Negative for diarrhea and vomiting.   Musculoskeletal:  Negative for neck pain.        Patient complains of pain at recent amputation site     Neurological:  Negative for seizures, weakness and headaches.   All other systems reviewed and are negative.  Objective:     Vital Signs (Most Recent):  Temp: 97.5 °F (36.4 °C) (10/06/22 1415)  Pulse: 77 (10/06/22 1415)  Resp: (!) 28 (10/06/22 1415)  BP: (!) 115/54 (10/06/22 1415)  SpO2: 97 % (10/06/22 1415)   Vital Signs (24h Range):  Temp:  [97.5 °F (36.4 °C)-99 °F (37.2 °C)] 97.5 °F (36.4 °C)  Pulse:  [67-87] 77  Resp:  [13-39] 28  SpO2:  [94 %-100 %] 97 %  BP: ()/(30-63) 115/54     Weight: 98.5 kg (217 lb 2.5 oz)  Body mass index is 30.29 kg/m².    No intake or output data in the 24 hours ending 10/06/22 1452    Physical Exam  Vitals reviewed.   Constitutional:       Appearance: Normal appearance.   HENT:      Head: Normocephalic and atraumatic.      Nose: Nose normal.   Eyes:      Extraocular Movements: Extraocular movements intact.      Pupils: Pupils are equal, round, and reactive to light.   Cardiovascular:      Rate and Rhythm: Normal rate and regular rhythm.   Pulmonary:      Effort: Pulmonary effort is normal. No respiratory distress.      Comments: Symmetric chest rise.  Abdominal:      General: Abdomen is flat. There is no distension.      Palpations: Abdomen is soft.   Musculoskeletal:         General: No deformity or signs of injury.      Right lower leg: No edema.      Left lower leg: No edema.      Comments: Remote R BKA with stump in good condition.  L BKA site wrapped in gauze.  The dressing is clean,  dry, intact.     Skin:     General: Skin is warm and dry.   Neurological:      General: No focal deficit present.      Mental Status: He is alert and oriented to person, place, and time.       Vents:  Oxygen Concentration (%): 21 (09/25/22 2100)    Lines/Drains/Airways       Peripherally Inserted Central Catheter Line  Duration             PICC Double Lumen 09/18/22 1738 right basilic 17 days              Drain  Duration                  Hemodialysis AV Fistula 09/14/22 0717 Left upper arm 22 days                    Significant Labs:    CBC/Anemia Profile:  Recent Labs   Lab 10/06/22  1359   WBC 7.51   HGB 8.1*   HCT 27.4*      MCV 95   RDW 16.3*        Chemistries:  Recent Labs   Lab 10/06/22  0448 10/06/22  1359   NA  --  131*   K  --  4.9   CL  --  108   CO2  --  14*   BUN  --  17   CREATININE 1.7* 2.1*   CALCIUM  --  8.0*       A1C: No results for input(s): HGBA1C in the last 48 hours.  Blood Culture: No results for input(s): LABBLOO in the last 48 hours.  Coagulation: No results for input(s): PT, INR, APTT in the last 48 hours.  Lactic Acid: No results for input(s): LACTATE in the last 48 hours.  Pathology Results  (Last 10 years)                 09/17/22 1033  Specimen to Pathology, Surgery Orthopedics Final result    Narrative:  Pre-op Diagnosis: Closed fracture of left tibia and fibula   with routine healing, subsequent encounter [S82.202D,   S82.402D]   Acute osteomyelitis of left calcaneus [M86.172]   Infection of deep incisional surgical site after procedure,   initial encounter [T81.42XA]   Procedure(s):   REMOVAL, EXTERNAL FIXATION DEVICE   INCISION AND DRAINAGE, LOWER EXTREMITY   Number of specimens: 2   Name of specimens: 1. Hardware removed left leg (perm). 2.   Bone biopsy   Which provider would you like to cc?->RIGO JEFFERY   Release to patient->Immediate   Specimen total (fresh, frozen, permanent):->2       07/29/20 0934  Specimen to Pathology, Dermatology Final result    Narrative:   Number of Specimens:->1   ------------------------->-------------------------   Spec 1 Procedure:->Biopsy   Spec 1 Clinical Impression:->SCC, BCC   Spec 1 Source:->left cheek       06/29/20 1537  Specimen to Pathology, Dermatology Final result    Narrative:  Number of Specimens:->2   ------------------------->-------------------------   Spec 1 Procedure:->Biopsy   Spec 1 Clinical Impression:->R/O SCC   Spec 1 Source:->right ear   ------------------------->-------------------------   Spec 2 Procedure:->Biopsy   Spec 2 Clinical Impression:->R/O SCC   Spec 2 Source:->right temple       04/22/13 0930  Tissue Specimen to Pathology Final result    04/22/13 0930  Tissue Specimen to Pathology Final result    02/27/13 0000  Tissue Specimen to Pathology Final result    01/18/13 0000  Tissue Specimen to Pathology Final result          POCT Glucose:   Recent Labs   Lab 10/05/22  1948 10/06/22  0445 10/06/22  1111   POCTGLUCOSE 149* 115* 113*     Respiratory Culture: No results for input(s): GSRESP, RESPIRATORYC in the last 48 hours.  Troponin: No results for input(s): TROPONINI in the last 48 hours.  Urine Culture: No results for input(s): LABURIN in the last 48 hours.  Urine Studies: No results for input(s): COLORU, APPEARANCEUA, PHUR, SPECGRAV, PROTEINUA, GLUCUA, KETONESU, BILIRUBINUA, OCCULTUA, NITRITE, UROBILINOGEN, LEUKOCYTESUR, RBCUA, WBCUA, BACTERIA, SQUAMEPITHEL, HYALINECASTS in the last 48 hours.    Invalid input(s): WRIGHTSUR    Significant Imaging:   I have reviewed all pertinent imaging results/findings within the past 24 hours.  ABG  No results for input(s): PH, PO2, PCO2, HCO3, BE in the last 168 hours.  Assessment/Plan:     Pulmonary  Chronic obstructive pulmonary disease  Not in exacerbation  -nebulizer treaments   -supplemental oxygen as needed        Cardiac/Vascular  Hypotension  Afebrile and no leukocytosis post-op  Hgb 8.1 today 10/6 from 9.5 on 10/3.  Monintor  No obvious sign of bleeding.    Most likely  associated with general anesthesia.  Intensive BP monitoring.  Low threshold for repeat CBC    Goal MAP > 65  Norepi as needed.      Renal/   donor kidney transplant for DM 16  S/p kidney transplant   -medications - tacrolimu continued  Was also on mycophenylate earlier this hospitalization.    Mycophenylate was discontinued  by renal due to his current & active infection.  Will need to clarify regimen    Creatine now up to 2.1.   Renal had signed off.  I have asked renal to see patient again.    ID  * Acute osteomyelitis of left calcaneus  S/p BKA today 10/6    Endocrine  Type 2 diabetes mellitus with hyperglycemia, with long-term current use of insulin  subcu insulin    Other  MARIA INES (obstructive sleep apnea)  CPAP qhs        Critical Care Daily Checklist:    A: Awake: RASS Goal/Actual Goal: RASS Goal: 0-->alert and calm  Actual:     B: Spontaneous Breathing Trial Performed?     C: SAT & SBT Coordinated?  Not intubated                      D: Delirium: CAM-ICU     E: Early Mobility Performed? Yes   F: Feeding Goal: Goals: 1. Pt diet order will be advanced with 24-48 hours. 2. Pt will consume >75% of energy needs by RD follow up.  Status: Nutrition Goal Status: new   Current Diet Order   Procedures    Diet NPO      AS: Analgesia/Sedation No sedation indicated   T: Thromboembolic Prophylaxis SCDs.  Heparin when ok with vascular surgery   H: HOB > 300 Yes   U: Stress Ulcer Prophylaxis (if needed) Pepcid   G: Glucose Control Insulin subcu   B: Bowel Function Stool Occurrence: 1   I: Indwelling Catheter (Lines & Woody) Necessity    D: De-escalation of Antimicrobials/Pharmacotherapies Continue ceftriaxone and flagyl    Plan for the day/ETD Monitor BP  Wean norepi as able    Code Status:  Family/Goals of Care: DNR       Critical Care Time: 80 minutes  Critical secondary to Patient has a condition that poses threat to life and bodily function: hypotension requiring norepi.     Critical care was time spent  personally by me on the following activities: development of treatment plan with patient or surrogate and bedside caregivers, discussions with consultants, evaluation of patient's response to treatment, examination of patient, ordering and performing treatments and interventions, ordering and review of laboratory studies, ordering and review of radiographic studies, pulse oximetry, re-evaluation of patient's condition. This critical care time did not overlap with that of any other provider or involve time for any procedures.    Thank you for your consult.      Jose Arcos MD  Critical Care Medicine  [facility]

## 2022-10-06 NOTE — ASSESSMENT & PLAN NOTE
Afebrile and no leukocytosis post-op  Hgb 7.4 today from 8.1 on 10/6 & 9.5 on 10/3.  Will transfuse   No obvious sign of bleeding.  Continue ICU hemodynamic monitoring  Titrate pressor for goal MAP > 65

## 2022-10-06 NOTE — ANESTHESIA PROCEDURE NOTES
Intubation    Date/Time: 10/6/2022 9:37 AM  Performed by: Brown Cifuentes CRNA  Authorized by: Lavelle Dong II, MD     Intubation:     Induction:  Intravenous    Intubated:  Postinduction    Attempts:  1    Attempted By:  CRNA    Method of Intubation:  Direct    Blade:  Martinez 2    Laryngeal View Grade: Grade I - full view of cords      Difficult Airway Encountered?: No      Complications:  None    Airway Device:  Oral endotracheal tube    Airway Device Size:  7.5    Style/Cuff Inflation:  Cuffed (inflated to minimal occlusive pressure)    Tube secured:  21    Secured at:  The lips    Placement Verified By:  Capnometry    Complicating Factors:  None    Findings Post-Intubation:  BS equal bilateral

## 2022-10-06 NOTE — ASSESSMENT & PLAN NOTE
Hypotensive following surgery, treated with IV fluid bolus with no improvement, placed on Levophed drip, transferred to ICU    --Vitals per Unit protocol  --Continue Levophed at this time  --Hold HTN medications  --Consult Critical Care

## 2022-10-06 NOTE — ASSESSMENT & PLAN NOTE
Arterial studies of left leg indicates significant stenosis  Vascular consult for possible angiogram >>> 9/23/22  NPO after MN  9/29/22 Plan for angiogram today- completed  --Plan for amputation of left leg- completed 10/6/22

## 2022-10-06 NOTE — PLAN OF CARE
Kecia (sister) called and updated regarding patient's status in recovery. Reassurance provided. Questions answered.

## 2022-10-06 NOTE — ASSESSMENT & PLAN NOTE
-Orthopedic Surgery following   -IV antibiotics  - Rocephin and Flagyl x 6 weeks  Proteus Mirabilis and B. faecis   -NWB LLE  DVT prophylaxis 24 hours after surgery   -Post op removal of the external fixator and debridement of osteomyelitis   -analgesia as needed   Per Ortho -  He just needs to be set up with home health for wound checks and may follow-up with ortho next week for a recheck ( pt to return to [facility])  --10/2: Plan for Left BKA this week due to minimal improvement and continued systemic symptoms of infection  10/6: Left BKA completed

## 2022-10-06 NOTE — HPI
66 yo male with HTN, CAD, DM, PVD, kidney transplant 2016-now with CKD3, COPD, Right BKA, Left transmetatarsal amputation, and recent Closed Fracture pf left tibia/fibula s/p closed reduction left tibial and fibular shaft fractures with application of external fixation device on 8/1/22 who was admitted with Left ankle wound infection at ext-fix pin site with calcaneus osteomyelitis on IV Vanc and Cefepime    9/17:   OR and Ex-fix hardware removed.  Antibiotic beads and wound vac placed.  Fluoro revealed unhealed fractures.  Was likely to need amputation.    Over next several days, was attempted to be tuned up from renal perspective.      9/21/22 - Arterial studies to RLE noted with hemodynamically significant stenosis suspected within the proximal superficial femoral artery. Vascular consulted for possible angiography.    9/23: Pt refused angiogram.    9/29: pt ultimately underwent angiogram and no targets for revascularization by endovascular or bypass identified.    Ortho ultimately recommended BKA.    10/6:  Pt underwent LLE BKA today.  While in PACU was hypotensive and now transferred to ICU.  BP now improved.  PT awake and interactive.    On arrival in ICU, pt is somewhat irriatble and not the best historian.  He states earlier he had abdominal discomfort with some nausea however that has since improved.  He specifically denies any fevers, sweats, chills.

## 2022-10-06 NOTE — SUBJECTIVE & OBJECTIVE
Review of patient's allergies indicates:  No Known Allergies    Review of Systems   Reason unable to perform ROS: complaint of pain but refuses further discussion or full ROS.   Objective:     Vital Signs (Most Recent):  Temp: 99.5 °F (37.5 °C) (10/07/22 0315)  Pulse: 90 (10/07/22 0615)  Resp: 16 (10/07/22 0828)  BP: (!) 98/49 (10/07/22 0615)  SpO2: 97 % (10/07/22 0615)   Vital Signs (24h Range):  Temp:  [97.5 °F (36.4 °C)-99.5 °F (37.5 °C)] 99.5 °F (37.5 °C)  Pulse:  [67-96] 90  Resp:  [10-44] 16  SpO2:  [89 %-100 %] 97 %  BP: ()/(30-56) 98/49     Weight: 98.5 kg (217 lb 2.5 oz)  Body mass index is 30.29 kg/m².      Intake/Output Summary (Last 24 hours) at 10/7/2022 0829  Last data filed at 10/7/2022 0600  Gross per 24 hour   Intake 2235.82 ml   Output 645 ml   Net 1590.82 ml      NORepinephrine bitartrate-D5W 0.04 mcg/kg/min (10/07/22 0600)         Physical Exam  Vitals and nursing note reviewed.   Constitutional:       General: He is awake.      Appearance: He is overweight. He is ill-appearing. He is not toxic-appearing.   HENT:      Head: Atraumatic.   Eyes:      Conjunctiva/sclera: Conjunctivae normal.   Neck:      Vascular: No JVD.   Cardiovascular:      Rate and Rhythm: Normal rate and regular rhythm.      Pulses:           Radial pulses are 2+ on the right side and 2+ on the left side.   Pulmonary:      Effort: Pulmonary effort is normal.      Breath sounds: Decreased breath sounds and rhonchi present. No wheezing.   Abdominal:      General: There is no distension.      Palpations: Abdomen is soft.   Musculoskeletal:      Right lower leg: No edema.      Left lower leg: No edema.   Skin:     General: Skin is warm and dry.      Capillary Refill: Capillary refill takes 2 to 3 seconds.          Neurological:      Mental Status: He is alert.      GCS: GCS eye subscore is 4. GCS verbal subscore is 5. GCS motor subscore is 6.   Psychiatric:         Mood and Affect: Affect is angry.         Speech:  Speech normal.         Behavior: Behavior is agitated.      Comments: Expressed desire not to discuss ROS or pain       Vents:  Oxygen Concentration (%): 21 (09/25/22 2100)    Lines/Drains/Airways       Peripherally Inserted Central Catheter Line  Duration             PICC Double Lumen 09/18/22 1738 right basilic 18 days              Drain  Duration                  Hemodialysis AV Fistula 09/14/22 0717 Left upper arm 23 days         Urethral Catheter 10/06/22 2230 <1 day                    Significant Labs:    CBC/Anemia Profile:  Recent Labs   Lab 10/06/22  1359 10/07/22  0501   WBC 7.51 8.93   HGB 8.1* 7.4*   HCT 27.4* 25.2*    346   MCV 95 95   RDW 16.3* 16.4*        Chemistries:  Recent Labs   Lab 10/06/22  0448 10/06/22  1359 10/07/22  0501   NA  --  131* 130*   K  --  4.9 4.8   CL  --  108 106   CO2  --  14* 12*   BUN  --  17 21   CREATININE 1.7* 2.1* 2.8*   CALCIUM  --  8.0* 8.2*   MG  --   --  1.8         Significant Imaging:   I have reviewed all pertinent imaging results/findings within the past 24 hours.

## 2022-10-06 NOTE — TRANSFER OF CARE
"Anesthesia Transfer of Care Note    Patient: Lavelle Ladd    Procedure(s) Performed: Procedure(s) (LRB):  AMPUTATION, BELOW KNEE (Left)    Patient location: PACU    Anesthesia Type: general    Transport from OR: Transported from OR on room air with adequate spontaneous ventilation    Post pain: adequate analgesia    Post assessment: no apparent anesthetic complications    Post vital signs: stable    Level of consciousness: responds to stimulation    Nausea/Vomiting: no nausea/vomiting    Complications: none    Transfer of care protocol was followed      Last vitals:   Visit Vitals  BP (!) 73/42   Pulse 87   Temp 36.7 °C (98.1 °F) (Skin)   Resp (!) 23   Ht 5' 11" (1.803 m)   Wt 98.5 kg (217 lb 2.5 oz)   SpO2 100%   BMI 30.29 kg/m²     "

## 2022-10-06 NOTE — INTERVAL H&P NOTE
The patient has been examined and the H&P has been reviewed:    I concur with the findings and no changes have occurred since H&P was written.    Anesthesia/Surgery risks, benefits and alternative options discussed and understood by patient/family.          Active Hospital Problems    Diagnosis  POA    *Acute osteomyelitis of left calcaneus [M86.172]  Yes    Peripheral artery disease [I73.9]  Yes    Infection of deep incisional surgical site after procedure [T81.42XA]  Yes    MARIA INES (obstructive sleep apnea) [G47.33]  Yes    Stage 3 chronic kidney disease [N18.30]  Yes    Long term current use of immunosuppressive drug [Z79.899]  Not Applicable    Chronic obstructive pulmonary disease [J44.9]  Yes    Coronary artery disease of native artery of native heart with stable angina pectoris [I25.118]  Yes    Type 2 diabetes mellitus with hyperglycemia, with long-term current use of insulin [E11.65, Z79.4]  Not Applicable     donor kidney transplant for DM 16 [Z94.0]  Not Applicable     Chronic     Induction with Thymo x3 and IV solumedrol to total 875mg    Kidney Biopsy   2016: 16 glomeruli, ACR type 1 AVR type 2, significant microcirculatory changes, c4d negative, No DSA, 5 to10% fibrosis. Treated with thymo x8 (5 full, 3 half doses)  2016: insufficient sample since it had no glomeruli; C4d was negative in peritubular capillaries but insufficient tissue to comment on presence of rejection  8/15/16: 25 glomeruli, moderate microcirculatory inflammation, positive C4d (20-30%), interstitial infiltrate with tubulitis but <5% thus not meeting diagnostic criteria for ACR but suspicious for ACR, no AVR, CNI toxicity, ~10% interstitial fibrosis, 7 foci of calcium phos crystals. Rx SM pulsex3 in BR -16 Endothelial cell crossmatch against target cells EC1 and EC2 negative, no DILIA antibodies detected. AT1R also negative. At present time no change in management. (resulted )        Essential  hypertension [I10]  Yes      Resolved Hospital Problems    Diagnosis Date Resolved POA    Hyponatremia [E87.1] 09/22/2022 Yes    H/O amputation [Z89.9] 10/05/2022 Yes

## 2022-10-06 NOTE — ASSESSMENT & PLAN NOTE
S/p kidney transplant   tacrolimus continued  Was also on mycophenylate earlier this hospitalization; discontinued 9/21 by renal due to infection.  Will need to clarify regimen  Also creatinine up 2.8  Renal had signed off. asked renal to review and make new recs, will see today

## 2022-10-06 NOTE — PROGRESS NOTES
1630: Talked to vascular surgery NP. Given new orders for Dilaudid for patient's uncontrolled pain post surgery

## 2022-10-06 NOTE — HOSPITAL COURSE
10/6:  Hypotensive with SBP in 60s in PACU.  Now transferred to ICU.  10/7:  remains on low dose levophed for BP support. H/H down some and received 1un RBC. Complains of uncontrolled pain but will not discuss further on attempt to determine if dose/med/or frequency change would be most helpful  10/8:  Midodrine started.  Attempt to wean norepi off.  Hgb 8.4 today.  Ongoing metabolic acidosis of unclear etiology.  AMS last night consistent with delirium.  DC Ativan PRN.  Change Norco PO PRN to Dilaudid PO PRN in setting of his renal dysfunction.  Missed morning midodrine dose due to his delirium and not suitable for PO at that time.  10/9 - remains on pressor though lower dose now on q8h midodrine. Hgb stable. Continued worsening creatinine and acidosis, stable electrolytes. Remains confused with improved pain control  10/10 more lucid than previous exams. Reports overnight awareness of hallucinations. BP stable off levophed >12 hours after addition of florinef and stress dose IV steroid. Urine output remains ~10/hour with continued worsening renal function
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
## 2022-10-06 NOTE — OR NURSING
Patient's blood pressure remained significantly low in recovery, Dr. Dong was notified. 1L bolus given, patient placed in trendelenburg, blood pressure recycled every 5 min. Patient's blood pressure remained 60-70/30's. Dr. Dong notified. Levophed drip started @ .04mcg. Patient's blood pressure improved. Patient was transferred to ICU for closer monitoring.

## 2022-10-06 NOTE — PT/OT/SLP PROGRESS
Physical Therapy      Patient Name:  Lavelle Ladd   MRN:  [id number]    JAMES ALSTON INITIATED THIS AM VIA CHART REVIEW, PT CURRENTLY AWAY FOR SURGERY, WILL ASSESS PT NEXT VISIT    Kavita Arango, PT  10/6/2022  0821

## 2022-10-06 NOTE — ANESTHESIA POSTPROCEDURE EVALUATION
Anesthesia Post Evaluation    Patient: Lavelle Ladd    Procedure(s) Performed: Procedure(s) (LRB):  AMPUTATION, BELOW KNEE (Left)    Final Anesthesia Type: general      Patient location during evaluation: PACU  Patient participation: Yes- Able to Participate  Level of consciousness: awake and alert  Pain management: adequate  Airway patency: patent  MARIA INES mitigation strategies: Verification of full reversal of neuromuscular block  PONV status at discharge: No PONV  Anesthetic complications: no      Cardiovascular status: hypotensive  Respiratory status: spontaneous ventilation  Hydration status: euvolemic            Vitals Value Taken Time   BP 96/47 10/06/22 1503   Temp 36.4 °C (97.5 °F) 10/06/22 1415   Pulse 77 10/06/22 1514   Resp 22 10/06/22 1514   SpO2 99 % 10/06/22 1514   Vitals shown include unvalidated device data.      Event Time   Out of Recovery 10/06/2022 14:20:00         Pain/Shereen Score: Pain Rating Prior to Med Admin: 7 (10/6/2022  2:58 PM)  Pain Rating Post Med Admin: 5 (10/6/2022  1:30 PM)  Shereen Score: 10 (10/6/2022  2:00 PM)

## 2022-10-06 NOTE — PROGRESS NOTES
"Housekeeping found unlabeled pills in a baggie in a nighttable  pt's room on tele 230.   gave them to this RN. This RN brought to pharmacist Jessica for id.  ID"d as norco 10s.  6 pills in baggie.  Pills wasted in icu pixxus with Jessica, pharmacist.    "

## 2022-10-06 NOTE — PT/OT/SLP PROGRESS
Occupational Therapy      Patient Name:  Lavelle Ladd   MRN:  [id number]    OT re-eval orders received. Chart review completed. Presented to room at 0840 and patient out of room for BKA. Will continue efforts.    Jessica Enriquez, OT    10/6/2022  0840

## 2022-10-06 NOTE — ASSESSMENT & PLAN NOTE
Left BKA completed 10/6/22    --Continue antibiotics per original plan due to bacteremia  --PT/OT  --D/C to [facility] when medically stable

## 2022-10-06 NOTE — ASSESSMENT & PLAN NOTE
9/14/22: c/o of left ankle pain-controlled. Pt was scheduled for surgery, however, surgery was post-poned 2/2 hyponatremia -Na 126. WBC normal, afebrile. Blood cultures show NGTD. .3. cont IV Abx  09/15/22- removal of the external fixator and debridement of osteomyelitis planned for today- procedure postponed due to hyponatremia- Lasix given - Nephrology following   9/16/22 - procedure postponed due to hyperglycemia  9/17/22 - post op day 1 - ABX in progress  9/19/22 - post op day 3 - Cefepime continued, Zyvox stopped. NWB. Wound care consulted for wound vac changes  9/20/22 - bone cultures pending. Aerobic culture isolated proteus mirabils  9/21/22 - Proteus mirabilis and B. faecis - Unasyn  9/22/22 - 6 weeks of Rocephin and Flagyl per Dr. Veliz  9/24/22 Vascular surgery was unable to complete the angiogram yesterday d/t refusing it. Will await further recs by Vascular surgery. Continue treatment with IV ABX  9/25/22 Ortho is recommending a BKA per vascular surgery. Awaiting Vascular surgery recs. Continue current management with IV ABX.   9/26/22 The patient is alert and oriented today. He reports that he does not remember refusing the angiogram on 9/23/22. He reports that he is agreeable to the procedure. Vascular Surgery was reconsulted today. Ortho is following. Continue IV ABX. Wound vac is in place.   9/27/22 Vascular surgery reconsulted and plans to do angiogram on 9/29/22 to evaluate for reperfusion vs amputation. Ortho is following.   9/28/22 Vascular surgery plans for a LLE angiogram in AM. NPO after MN. Ortho following   9/29/22 The patients renal function improved with IVF's. Plans for angiogram of LLE today with Vascular surgery to determine if revascularization is possible or if the patient will need a BKA. Will consult PT/OT for recs to assist with placement.   10/1/22 patient febrile ON, septic workup ordered  10/2: BC: NGTD, Afebrile overnight  10/4- BKA planned for 10-6, CM working on  placement following BKA  10/06/2022 NPO after midnight, d/c heparin after midnight  10/7: Left BKA completed

## 2022-10-06 NOTE — SUBJECTIVE & OBJECTIVE
Interval History: Transferred to ICU following BKA due to hypotension, placed on Levophed drip. Pain pills found in room, identified as Callaway's, patient will be required to swallow all pills in front of the nurse for the duration of this hospitalization.     Review of Systems   Constitutional:  Positive for activity change, appetite change and fatigue. Negative for chills, diaphoresis, fever and unexpected weight change.   HENT:  Negative for congestion, hearing loss, nosebleeds, postnasal drip, rhinorrhea and trouble swallowing.    Eyes:  Negative for discharge and visual disturbance.   Respiratory:  Negative for cough, chest tightness and shortness of breath.    Cardiovascular:  Negative for chest pain, palpitations and leg swelling.   Gastrointestinal:  Positive for abdominal distention. Negative for abdominal pain, constipation, diarrhea, nausea and vomiting.   Endocrine: Negative for cold intolerance and heat intolerance.   Genitourinary:  Negative for difficulty urinating, dysuria, frequency and hematuria.   Musculoskeletal:  Positive for back pain and gait problem. Negative for arthralgias and myalgias.   Skin:  Positive for wound.   Neurological:  Positive for weakness and light-headedness. Negative for dizziness and headaches.   Hematological:  Negative for adenopathy. Does not bruise/bleed easily.   Psychiatric/Behavioral:  Negative for agitation, behavioral problems and confusion. The patient is not nervous/anxious.    Objective:     Vital Signs (Most Recent):  Temp: 98.1 °F (36.7 °C) (10/06/22 1600)  Pulse: 76 (10/06/22 1600)  Resp: 20 (10/06/22 1600)  BP: (!) 102/51 (10/06/22 1600)  SpO2: 99 % (10/06/22 1600)   Vital Signs (24h Range):  Temp:  [97.5 °F (36.4 °C)-99 °F (37.2 °C)] 98.1 °F (36.7 °C)  Pulse:  [67-87] 76  Resp:  [13-39] 20  SpO2:  [94 %-100 %] 99 %  BP: ()/(30-63) 102/51     Weight: 98.5 kg (217 lb 2.5 oz)  Body mass index is 30.29 kg/m².  No intake or output data in the 24 hours  ending 10/06/22 1702   Physical Exam  Vitals and nursing note reviewed.   Constitutional:       General: He is not in acute distress.     Appearance: He is well-developed. He is ill-appearing. He is not diaphoretic.   HENT:      Head: Normocephalic and atraumatic.      Right Ear: Hearing and external ear normal.      Left Ear: Hearing and external ear normal.      Nose: Nose normal. No mucosal edema or rhinorrhea.      Mouth/Throat:      Pharynx: Uvula midline.   Eyes:      General:         Right eye: No discharge.         Left eye: No discharge.      Conjunctiva/sclera: Conjunctivae normal.      Right eye: No chemosis.     Left eye: No chemosis.     Pupils: Pupils are equal, round, and reactive to light.   Neck:      Thyroid: No thyroid mass or thyromegaly.      Trachea: Trachea normal.   Cardiovascular:      Rate and Rhythm: Normal rate and regular rhythm.      Heart sounds: Normal heart sounds. No murmur heard.  Pulmonary:      Effort: Pulmonary effort is normal. No respiratory distress.      Breath sounds: Normal breath sounds. No decreased breath sounds or wheezing.   Abdominal:      General: Bowel sounds are decreased. There is distension.      Palpations: Abdomen is soft.      Tenderness: There is no abdominal tenderness.   Musculoskeletal:         General: Normal range of motion.      Cervical back: Normal range of motion and neck supple.      Right Lower Extremity: Right leg is amputated below knee.      Left Lower Extremity: Left leg is amputated below knee.   Lymphadenopathy:      Cervical: No cervical adenopathy.      Upper Body:      Right upper body: No supraclavicular adenopathy.      Left upper body: No supraclavicular adenopathy.   Skin:     General: Skin is warm and dry.      Capillary Refill: Capillary refill takes less than 2 seconds.      Findings: No rash.          Neurological:      Mental Status: He is oriented to person, place, and time. He is lethargic.   Psychiatric:         Mood and  Affect: Mood is anxious and depressed.         Speech: Speech normal.         Behavior: Behavior normal.         Thought Content: Thought content normal.         Judgment: Judgment normal.       Significant Labs: All pertinent labs within the past 24 hours have been reviewed.  CBC:   Recent Labs   Lab 10/06/22  1359   WBC 7.51   HGB 8.1*   HCT 27.4*        CMP:   Recent Labs   Lab 10/06/22  0448 10/06/22  1359   NA  --  131*   K  --  4.9   CL  --  108   CO2  --  14*   GLU  --  208*   BUN  --  17   CREATININE 1.7* 2.1*   CALCIUM  --  8.0*   ANIONGAP  --  9       Significant Imaging: I have reviewed all pertinent imaging results/findings within the past 24 hours.
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
## 2022-10-07 NOTE — PT/OT/SLP EVAL
"Occupational Therapy   Evaluation    Name: Lavelle Ladd  MRN: [id number]  Admitting Diagnosis:  Acute osteomyelitis of left calcaneus  Recent Surgery: Procedure(s) (LRB):  AMPUTATION, BELOW KNEE (Left) 1 Day Post-Op    Recommendations:     Discharge Recommendations: nursing facility, skilled  Discharge Equipment Recommendations:   (TBD at next level of care)  Barriers to discharge:  None    Assessment:     Lavelle Ladd is a 67 y.o. male with a medical diagnosis of Acute osteomyelitis of left calcaneus.  He presents with the following performance deficits affecting function: weakness, impaired endurance, impaired self care skills, impaired functional mobility, impaired balance, pain, impaired cardiopulmonary response to activity, decreased safety awareness, orthopedic precautions, impaired skin, impaired cognition.      Rehab Prognosis: Fair; patient would benefit from acute skilled OT services to address these deficits and reach maximum level of function.       Plan:     Patient to be seen 2 x/week to address the above listed problems via self-care/home management, therapeutic exercises, therapeutic activities  Plan of Care Expires: 10/21/22  Plan of Care Reviewed with: patient    Subjective     Chief Complaint: Reported "IT HURTS!"  Patient/Family Comments/goals: none reported    Occupational Profile:  Patient unable to report. Has been admitted acute x23 days. Per eval completed on 9/19/23    Living Environment: lives with girlfriend in a 1 story house with a ramp to enter.   Previous level of function: Pt reports (I) with dressing. Mod (I) with bathing using a bath bench and mobility using a wheelchair. Pt can t/f to wheelchair (I). Pt WC-bound but reports being bed-bound the last month. Pt has a RLE prosthesis that he does not most of the time.   Roles and Routines: unknown  Equipment Used at home:  wheelchair, bedside commode, bath bench, grab bar, power chair  Assistance upon Discharge: " unknown    Pain/Comfort:  Pain Rating 1:  (unable to rate. pain with all movement that resolves (nonverbally) with rest)  Location - Side 1: Left  Location 1: leg  Pain Addressed 1:  (activity pacing)    Objective:     Communicated with: NurseReed, prior to session.  Patient found supine with telemetry, knee immobilizer, blood pressure cuff, pulse ox (continuous), PICC line, grullon catheter upon OT entry to room.    General Precautions: Standard, fall   Orthopedic Precautions:LLE non weight bearing   Braces: N/A  Respiratory Status: Room air    Bed Mobility:    Patient completed Supine to Sit with total assistance and 2 persons  Patient completed Sit to Supine with total assistance and 2 persons  Patient sat EOB x8 mins with min-mod A (initially total A that improved with significant cueing for anterior lean). Due to lethargy overall functional A waxing and waning.    Functional Mobility/Transfers:  Not appropriate for functional transfers at this time    Activities of Daily Living:  Upper Body Dressing: total assistance .  Lower Body Dressing: total assistance .    Cognitive/Visual Perceptual:  Cognitive/Psychosocial Skills:     -       Oriented to: Person and Place   -       Follows Commands/attention:Easily distracted and Follows one-step commands  -       Mood/Affect/Coping skills/emotional control: Lethargic    Physical Exam:  Balance:    -       sitting: poor to absent  Upper Extremity Range of Motion:     -       Right Upper Extremity: WFL  -       Left Upper Extremity: WFL  Upper Extremity Strength:    -       Right Upper Extremity: Deficits: grossly 4-/5  -       Left Upper Extremity: Deficits: grossly 4-/5   Strength:    -       Right Upper Extremity: Deficits: poor  -       Left Upper Extremity: Deficits: poor    AMPAC 6 Click ADL:  AMPAC Total Score: 10    Treatment & Education:  Patient educated on role of OT in acute setting and benefits of participation. Educated on techniques to use to  increase independence and decrease fall risk with functional bed mobility. Educated on importance of OOB activity progression with skilled intervention necessary to progress towards completion. Encouraged completion of B UE AROM therex throughout the day to tolerance to increase functional strength and activity tolerance. Patient with questionable comprehension and will require reinforcement.    BP 97/46 in supine, 84/45 sitting EOB, 94/44 after return to supine. Nurse aware.    Patient left HOB elevated with all lines intact, call button in reach, bed alarm on, and nurse notified    GOALS:   Multidisciplinary Problems       Occupational Therapy Goals          Problem: Occupational Therapy    Goal Priority Disciplines Outcome Interventions   Occupational Therapy Goal     OT, PT/OT     Description: Goals to be met by: 10/21/22     Patient will increase functional independence with ADLs by performing:    Sitting at edge of bed x20 minutes with Contact Guard Assistance.  Supine to sit with Contact Guard Assistance.  Increased functional strength in B UE grossly by 1/2 MM grade.                         History:     Past Medical History:   Diagnosis Date    DINORAH (acute kidney injury) 2016    Arthritis     CAD in native artery 2019    CHF (congestive heart failure)     Chronic obstructive pulmonary disease 2016    Coronary artery disease involving native coronary artery of native heart without angina pectoris 2016    CRI (chronic renal insufficiency) 2019     donor kidney transplant for DM 16     Induction with Thymo x3 and IV solumedrol to total 875mg  Kidney Biopsy  2016: 16 glomeruli, ACR type 1 AVR type 2, significant microcirculatory changes, c4d negative, No DSA, 5 to10% fibrosis. Treated with thymo x8 2016- no rejection      Diastolic heart failure     Encounter for blood transfusion     ESRD on RRT since 10/2013 10/29/2013    Biopsy proven diabetic nephropathy and  lymphoplasmacytic interstitial infiltrate not c/w with AIN (ddx sjogrens or assoc with tamm-horsefall protein extravasation)     GERD (gastroesophageal reflux disease)     History of hepatitis C, s/p successful Rx w/ SVR12 - 4/2017 4/5/2017    Completed 12 weeks harvoni w/ SVR    Hyperlipidemia     Hypertension     PAD (peripheral artery disease) 7/21/2019    PIC line (peripherally inserted central catheter) flush     Prophylactic immunotherapy     Proteinuria     PVD (peripheral vascular disease) 6/26/2017    RLE BKA CT 12/11/16 Extensive atherosclerotic disease of the aorta and mesenteric arteries.     Renal hypertension     Type 2 diabetes mellitus with diabetic neuropathy, with long-term current use of insulin 12/1/2016    Vitamin B12 deficiency          Past Surgical History:   Procedure Laterality Date    ANGIOGRAPHY OF LOWER EXTREMITY N/A 9/23/2022    Procedure: ANGIOGRAM, LOWER EXTREMITY/left leg;  Surgeon: Donal Mcdonald MD;  Location: [facility] CATH LAB;  Service: Cardiovascular;  Laterality: N/A;    ANGIOGRAPHY OF LOWER EXTREMITY N/A 9/29/2022    Procedure: ANGIOGRAM, LOWER EXTREMITY/left leg;  Surgeon: Donal Mcdonald MD;  Location: [facility] CATH LAB;  Service: Peripheral Vascular;  Laterality: N/A;  resched from 9/23 pt ready now    AORTOGRAPHY WITH SERIALOGRAPHY N/A 6/14/2018    Procedure: LEFT LEG ANGIOGRAM;  Surgeon: Donal Mcdonald MD;  Location: [facility] CATH LAB;  Service: Vascular;  Laterality: N/A;    APPLICATION OF LARGE EXTERNAL FIXATION DEVICE TO TIBIA Left 8/4/2022    Procedure: APPLICATION, EXTERNAL FIXATION DEVICE, LARGE, TIBIA;  Surgeon: Good Villa MD;  Location: [facility] OR;  Service: Orthopedics;  Laterality: Left;    av bovine graft      Left UE    AV FISTULA PLACEMENT      left UE    BELOW KNEE AMPUTATION OF LOWER EXTREMITY Left 10/6/2022    Procedure: AMPUTATION, BELOW KNEE;  Surgeon: Donal Mcdonald MD;  Location: [facility] OR;  Service: Vascular;  Laterality: Left;    CARDIAC CATHETERIZATION  02/2015     CLOSED REDUCTION OF INJURY OF TIBIA Left 8/4/2022    Procedure: CLOSED REDUCTION, TIBIA;  Surgeon: Good Villa MD;  Location: [facility] OR;  Service: Orthopedics;  Laterality: Left;  Closed reduction left tibial and fibular shaft fractures    CLOSURE OF WOUND Left 9/24/2018    Procedure: CLOSURE, WOUND;  Surgeon: Karla Wheeler DPM;  Location: [facility] OR;  Service: Podiatry;  Laterality: Left;  Secondary Wound closure, extensive    CLOSURE OF WOUND Left 11/5/2018    Procedure: CLOSURE, WOUND;  Surgeon: Karla Wheeler DPM;  Location: [facility] OR;  Service: Podiatry;  Laterality: Left;    DEBRIDEMENT OF MULTIPLE METATARSAL BONES Left 11/5/2018    Procedure: DEBRIDEMENT, METATARSAL BONE, 2 OR MORE BONES;  Surgeon: Karla Wheelre DPM;  Location: [facility] OR;  Service: Podiatry;  Laterality: Left;    EXCISION OF SKIN Left 9/27/2019    Procedure: EXCISION, SKIN;  Surgeon: Lenard Alarcon MD;  Location: [address];  Service: Plastics;  Laterality: Left;  Plastics set, NIMS monitor, ACell    FOOT AMPUTATION THROUGH METATARSAL Left 9/21/2018    Procedure: AMPUTATION, FOOT, TRANSMETATARSAL;  Surgeon: Karla Wheeler DPM;  Location: [facility] OR;  Service: Podiatry;  Laterality: Left;    FOOT AMPUTATION THROUGH METATARSAL Left 10/31/2018    Procedure: AMPUTATION, FOOT, TRANSMETATARSAL;  Surgeon: Karla Wheeler DPM;  Location: [facility] OR;  Service: Podiatry;  Laterality: Left;    FOOT AMPUTATION THROUGH METATARSAL Left 11/5/2018    Procedure: AMPUTATION, FOOT, TRANSMETATARSAL;  Surgeon: Karla Wheeler DPM;  Location: [facility] OR;  Service: Podiatry;  Laterality: Left;  revisional transmetatarsal amputation, Left foot    IRRIGATION AND DEBRIDEMENT OF LOWER EXTREMITY Left 10/31/2018    Procedure: IRRIGATION AND DEBRIDEMENT, LOWER EXTREMITY;  Surgeon: Karla Wheeler DPM;  Location: [facility] OR;  Service: Podiatry;  Laterality: Left;    KIDNEY TRANSPLANT  05/21/2016    LEFT HEART CATHETERIZATION Left 7/21/2019     Procedure: CATHETERIZATION, HEART, LEFT;  Surgeon: Andrew Valdez MD;  Location: [facility] CATH LAB;  Service: Cardiology;  Laterality: Left;    LEG AMPUTATION THROUGH KNEE  2011    right LE, started as nail puncture leading to diabetic ulcer    REMOVAL OF EXTERNAL FIXATION DEVICE Left 9/17/2022    Procedure: REMOVAL, EXTERNAL FIXATION DEVICE;  Surgeon: Kathleen Zazueta MD;  Location: [facility] OR;  Service: Orthopedics;  Laterality: Left;    SKIN FULL THICKNESS GRAFT Left 10/7/2019    Procedure: APPLICATION, GRAFT, SKIN, FULL-THICKNESS;  Surgeon: Lenard Alarcon MD;  Location: [address];  Service: Plastics;  Laterality: Left;    SURGICAL REMOVAL OF LESION OF FACE Right 10/7/2019    Procedure: EXCISION, LESION, FACE;  Surgeon: Lenard Alarcon MD;  Location: [address];  Service: Plastics;  Laterality: Right;       Time Tracking:     OT Date of Treatment: 10/07/22  OT Start Time: 1015  OT Stop Time: 1040  OT Total Time (min): 25 min    Billable Minutes:Evaluation 15  Therapeutic Activity 10    10/7/2022

## 2022-10-07 NOTE — PLAN OF CARE
AAO, forgetful. Intermittent confusion to situation. POD 1 BKA. Constant phantom pains treated with prns. Levo gtt continued. Unable to wean. SR with 1st degree block. Borderline hypotension. Afebrile. Woody placed for urinary retention. Pt repositions self in bed with minimal assistance. No acute issues.

## 2022-10-07 NOTE — PROGRESS NOTES
[facility] - Intensive Care (Hospital)  Adult Nutrition  Progress Note    SUMMARY       Recommendations    Recommendation/Intervention:   1. Recommend continue Diabetic/Cardiac diet   2. Recommend continue Boost Glucose Control TID   3. Recommend continue Arginaid BID for wound healing   4. Recommend feeding assistance, encourage PO intake   5. When medically appropriate, recommend appeitie stimulant    Goals:   1. Pt will consume 75% PO intake by RD follow up   2. Pt will consume 50% supplements by RD follow up  Nutrition Goal Status: new  Communication of RD Recs: other (comment) (Plan of care: Sticky note)    Assessment and Plan    Nutrition Problem:  Inadequate energy intake    Related to (etiology):  Increased demand for nutrition    Signs and Symptoms (as evidenced by):  0-25% PO intake x 6 days, wound healing needs     Intervention/Recommendations (treatment strategy):  1. Recommend continue Diabetic/Cardiac diet   2. Recommend continue Boost Glucose Control TID   3. Recommend continue Arginaid BID for wound healing   4. Recommend feeding assistance, encourage PO intake   5. When medically appropriate, recommend appeitie stimulant  6. Collaboration of care with medical providers    Nutrition Diagnosis status:  New      Reason for Assessment    Reason For Assessment: RD follow-up  Diagnosis: other (see comments) (Acute osteomyelitis of left calcaneus)  Relevant Medical History: HTN, DMT2, CAD, COPD, CKD3  Interdisciplinary Rounds: did not attend  General Information Comments: Spoke to RN, stated pt irritable, with poor appetite/refusing to eat, possible FTT d/t additional (new) amputation. RN reported pt ate 25% for dinner, refusing breakfast, pt will sip on Boost and Arginaid and some PO intake when encouraged. Noted wt loss d/t amputation. LBM 10/6. RD will continue to monitor for PO intake.  Nutrition Discharge Planning: Diabetic/cardiac diet    Nutrition Risk Screen    Nutrition Risk Screen: large or  "nonhealing wound, burn or pressure injury    Nutrition/Diet History    Spiritual, Cultural Beliefs, Synagogue Practices, Values that Affect Care: no  Food Allergies: NKFA  Factors Affecting Nutritional Intake: decreased appetite, depression    Anthropometrics    Temp: 98.5 °F (36.9 °C)  Height Method: Stated  Height: 5' 11" (180.3 cm)  Height (inches): 71 in  Weight Method: Bed Scale  Weight: 86.2 kg (190 lb 0.6 oz)  Weight (lb): 190.04 lb  Ideal Body Weight (IBW), Male: 172 lb  % Ideal Body Weight, Male (lb): 110.49 %  BMI (Calculated): 26.5  BMI Grade: 25 - 29.9 - overweight  Amputation %: 5.9, 5.9  Total Amputation %: 11.8  Amputation Ideal Body Weight (IBW), Male (lb): 160.2 lb  Amputee BMI (kg/m2): 30.13 kg/m2  Additional Documentation: Amputations Present (Ideal Body Weight)    Lab/Procedures/Meds    Pertinent Labs Reviewed: reviewed  Pertinent Labs Comments: Na 128, Cr 1.6, GFR 46, Glu 266, Mg 1.4, Alb 1.8, , ALT 94, A1c 7.4% on 8/4/22  Pertinent Medications Reviewed: reviewed  Pertinent Medications Comments: atorvastatin, carvedilol, furosemide, gabapentin, insulin, magnesium oxide, tacrolimus  BMP  Lab Results   Component Value Date     (L) 10/07/2022    K 4.8 10/07/2022     10/07/2022    CO2 12 (L) 10/07/2022    BUN 21 10/07/2022    CREATININE 2.8 (H) 10/07/2022    CALCIUM 8.2 (L) 10/07/2022    ANIONGAP 12 10/07/2022    ESTGFRAFRICA 47 (A) 08/15/2021    EGFRNONAA 41 (A) 08/15/2021      Recent Labs   Lab 10/07/22  1141   POCTGLUCOSE 211*    Scheduled Meds:   cefTRIAXone (ROCEPHIN) IVPB  2 g Intravenous Q24H    cholestyramine  1 packet Oral BID    dicyclomine  10 mg Oral QID (AC & HS)    epoetin dyana-epbx  50 Units/kg Subcutaneous Every Mon, Wed, Fri    famotidine  20 mg Oral Daily    gabapentin  100 mg Oral TID    Lactobacillus acidoph-L.bulgar  4 tablet Oral TID WM    linaCLOtide  145 mcg Oral Before breakfast    magnesium oxide  400 mg Oral BID    magnesium oxide  400 mg Oral Once    " metroNIDAZOLE  500 mg Oral Q8H    ondansetron  4 mg Intravenous Q8H    sertraline  25 mg Oral Daily    sodium bicarbonate  1,300 mg Oral TID    tacrolimus  1 mg Oral BID     Continuous Infusions:   NORepinephrine bitartrate-D5W 0.12 mcg/kg/min (10/07/22 1100)     PRN Meds:.sodium chloride, acetaminophen, albuterol-ipratropium, aluminum-magnesium hydroxide-simethicone, dextrose 10%, dextrose 10%, glucagon (human recombinant), glucose, glucose, HYDROcodone-acetaminophen, HYDROmorphone, influenza, insulin aspart U-100, melatonin, naloxone, ondansetron, prochlorperazine, simethicone, sodium chloride 0.9%   Physical Findings/Assessment    Wt Readings from Last 15 Encounters:   10/07/22 86.2 kg (190 lb 0.6 oz)   09/02/22 102 kg (224 lb 13.9 oz)   08/08/22 114.7 kg (252 lb 13.9 oz)   08/15/21 102.5 kg (226 lb)   02/11/21 101.6 kg (224 lb)   10/07/20 101.6 kg (224 lb)   08/12/20 101.6 kg (224 lb)   07/08/20 104.3 kg (230 lb)   07/07/20 104.3 kg (230 lb)   07/06/20 108.1 kg (238 lb 6.4 oz)   06/30/20 104.5 kg (230 lb 6.1 oz)   06/24/20 105.4 kg (232 lb 5.8 oz)   03/04/20 104.6 kg (230 lb 9.6 oz)   02/21/20 103.6 kg (228 lb 6.3 oz)   02/14/20 100.7 kg (222 lb)   ]    Estimated/Assessed Needs    Weight Used For Calorie Calculations: 72.8 kg (160 lb 7.9 oz) (Adj for both BKA)  Energy Calorie Requirements (kcal): 1818 (MSJ x 1.2 AF)  Energy Need Method: [facility]  Protein Requirements: 72-91 (1-1.25g/kg (Adj for age, amputations))  Weight Used For Protein Calculations: 72.8 kg (160 lb 7.9 oz)  Fluid Requirements (mL): 1818 (ml/kcal)  Estimated Fluid Requirement Method: RDA Method  RDA Method (mL): 1818  CHO Requirement: 227      Nutrition Prescription Ordered    Current Diet Order: Diabetic/Cardiac    Evaluation of Received Nutrient/Fluid Intake    % Kcal Needs: 0  % Protein Needs: 0  I/O: 0.5 L  Energy Calories Required: not meeting needs  Protein Required: not meeting needs  Fluid Required: meeting needs  Comments: LBM  9/15  % Intake of Estimated Energy Needs: 0 - 25 %  % Meal Intake: 0 - 25 %    Nutrition Risk    Level of Risk/Frequency of Follow-up: high     Monitor and Evaluation    Food and Nutrient Intake: food and beverage intake  Food and Nutrient Adminstration: diet order  Knowledge/Beliefs/Attitudes: food and nutrition knowledge/skill, beliefs and attitudes  Physical Activity and Function: nutrition-related ADLs and IADLs  Anthropometric Measurements: weight, weight change, body mass index  Biochemical Data, Medical Tests and Procedures: electrolyte and renal panel, lipid profile, gastrointestinal profile, glucose/endocrine profile, inflammatory profile  Nutrition-Focused Physical Findings: overall appearance     Nutrition Follow-Up    RD Follow-up?: Yes  Suraj Padillaitibing

## 2022-10-07 NOTE — CONSULTS
[facility] - Intensive Care (Hospital)  Nephrology  Consult Note    Patient Name: Lavelle Ldad  MRN: [id number]  Admission Date: 2022  Hospital Length of Stay: 23 days  Attending Provider: Donal Mcdonald MD   Primary Care Physician: Primary Doctor No  Principal Problem:Acute osteomyelitis of left calcaneus  Reason for Consult: DINORAH in Kidney transplant status   Primary Nephrologist: [facility], last seen by Dr. Estrada 2020    Inpatient consult to Nephrology  Consult performed by: Gianni Mastersno MD  Consult ordered by: Donal Mcdonald MD  Reason for consult: DINORAH in Kidney transplant status      Subjective:     HPI:   68 yo male with kidney transplant status last seen by Nephrology during this hospital stay for DINORAH which improved rapidly. Since then pt underwent a L BKA yesterday with subsequent hypotension requiring vasopressor support and expected anemia receiving blood transfusion currently.     Baseline allograft creatinine is around 1.4mg/dL. He has sustained several DINORAH insults in the past two years. Post operative creatinine was 1.7 and has increased to 2.8mg/dL this morning. UOP is decreased.     He denies any complaints currently, his partner is at bedside who assisted in feeding him lunch.     Past Medical History:   Diagnosis Date    DINORAH (acute kidney injury) 2016    Arthritis     CAD in native artery 2019    CHF (congestive heart failure)     Chronic obstructive pulmonary disease 2016    Coronary artery disease involving native coronary artery of native heart without angina pectoris 2016    CRI (chronic renal insufficiency) 2019     donor kidney transplant for DM 16     Induction with Thymo x3 and IV solumedrol to total 875mg  Kidney Biopsy  2016: 16 glomeruli, ACR type 1 AVR type 2, significant microcirculatory changes, c4d negative, No DSA, 5 to10% fibrosis. Treated with thymo x8 2016- no rejection      Diastolic heart failure     Encounter for  blood transfusion     ESRD on RRT since 10/2013 10/29/2013    Biopsy proven diabetic nephropathy and lymphoplasmacytic interstitial infiltrate not c/w with AIN (ddx sjogrens or assoc with tamm-horsefall protein extravasation)     GERD (gastroesophageal reflux disease)     History of hepatitis C, s/p successful Rx w/ SVR12 - 4/2017 4/5/2017    Completed 12 weeks harvoni w/ SVR    Hyperlipidemia     Hypertension     PAD (peripheral artery disease) 7/21/2019    PIC line (peripherally inserted central catheter) flush     Prophylactic immunotherapy     Proteinuria     PVD (peripheral vascular disease) 6/26/2017    RLE BKA CT 12/11/16 Extensive atherosclerotic disease of the aorta and mesenteric arteries.     Renal hypertension     Type 2 diabetes mellitus with diabetic neuropathy, with long-term current use of insulin 12/1/2016    Vitamin B12 deficiency        Past Surgical History:   Procedure Laterality Date    ANGIOGRAPHY OF LOWER EXTREMITY N/A 9/23/2022    Procedure: ANGIOGRAM, LOWER EXTREMITY/left leg;  Surgeon: Donal Mcdonald MD;  Location: [facility] CATH LAB;  Service: Cardiovascular;  Laterality: N/A;    ANGIOGRAPHY OF LOWER EXTREMITY N/A 9/29/2022    Procedure: ANGIOGRAM, LOWER EXTREMITY/left leg;  Surgeon: Donal Mcdonald MD;  Location: [facility] CATH LAB;  Service: Peripheral Vascular;  Laterality: N/A;  resched from 9/23 pt ready now    AORTOGRAPHY WITH SERIALOGRAPHY N/A 6/14/2018    Procedure: LEFT LEG ANGIOGRAM;  Surgeon: Donal Mcdonald MD;  Location: [facility] CATH LAB;  Service: Vascular;  Laterality: N/A;    APPLICATION OF LARGE EXTERNAL FIXATION DEVICE TO TIBIA Left 8/4/2022    Procedure: APPLICATION, EXTERNAL FIXATION DEVICE, LARGE, TIBIA;  Surgeon: Good Villa MD;  Location: [facility] OR;  Service: Orthopedics;  Laterality: Left;    av bovine graft      Left UE    AV FISTULA PLACEMENT      left UE    BELOW KNEE AMPUTATION OF LOWER EXTREMITY Left 10/6/2022    Procedure: AMPUTATION, BELOW KNEE;  Surgeon: Donal Mcdonald MD;   Location: [facility] OR;  Service: Vascular;  Laterality: Left;    CARDIAC CATHETERIZATION  02/2015    CLOSED REDUCTION OF INJURY OF TIBIA Left 8/4/2022    Procedure: CLOSED REDUCTION, TIBIA;  Surgeon: Good Villa MD;  Location: [facility] OR;  Service: Orthopedics;  Laterality: Left;  Closed reduction left tibial and fibular shaft fractures    CLOSURE OF WOUND Left 9/24/2018    Procedure: CLOSURE, WOUND;  Surgeon: Karla Wheeler DPM;  Location: [facility] OR;  Service: Podiatry;  Laterality: Left;  Secondary Wound closure, extensive    CLOSURE OF WOUND Left 11/5/2018    Procedure: CLOSURE, WOUND;  Surgeon: Karla Wheeler DPM;  Location: [facility] OR;  Service: Podiatry;  Laterality: Left;    DEBRIDEMENT OF MULTIPLE METATARSAL BONES Left 11/5/2018    Procedure: DEBRIDEMENT, METATARSAL BONE, 2 OR MORE BONES;  Surgeon: Karla Wheeler DPM;  Location: [facility] OR;  Service: Podiatry;  Laterality: Left;    EXCISION OF SKIN Left 9/27/2019    Procedure: EXCISION, SKIN;  Surgeon: Lenard Alarcon MD;  Location: [address];  Service: Plastics;  Laterality: Left;  Plastics set, NIMS monitor, ACell    FOOT AMPUTATION THROUGH METATARSAL Left 9/21/2018    Procedure: AMPUTATION, FOOT, TRANSMETATARSAL;  Surgeon: Karla Wheeler DPM;  Location: [facility] OR;  Service: Podiatry;  Laterality: Left;    FOOT AMPUTATION THROUGH METATARSAL Left 10/31/2018    Procedure: AMPUTATION, FOOT, TRANSMETATARSAL;  Surgeon: Karla Wheeler DPM;  Location: [facility] OR;  Service: Podiatry;  Laterality: Left;    FOOT AMPUTATION THROUGH METATARSAL Left 11/5/2018    Procedure: AMPUTATION, FOOT, TRANSMETATARSAL;  Surgeon: Karla Wheeler DPM;  Location: [facility] OR;  Service: Podiatry;  Laterality: Left;  revisional transmetatarsal amputation, Left foot    IRRIGATION AND DEBRIDEMENT OF LOWER EXTREMITY Left 10/31/2018    Procedure: IRRIGATION AND DEBRIDEMENT, LOWER EXTREMITY;  Surgeon: Karla Wheeler DPM;  Location: [facility] OR;  Service: Podiatry;   Laterality: Left;    KIDNEY TRANSPLANT  05/21/2016    LEFT HEART CATHETERIZATION Left 7/21/2019    Procedure: CATHETERIZATION, HEART, LEFT;  Surgeon: Andrew Valdez MD;  Location: [facility] CATH LAB;  Service: Cardiology;  Laterality: Left;    LEG AMPUTATION THROUGH KNEE  2011    right LE, started as nail puncture leading to diabetic ulcer    REMOVAL OF EXTERNAL FIXATION DEVICE Left 9/17/2022    Procedure: REMOVAL, EXTERNAL FIXATION DEVICE;  Surgeon: Kathleen Zazueta MD;  Location: [facility] OR;  Service: Orthopedics;  Laterality: Left;    SKIN FULL THICKNESS GRAFT Left 10/7/2019    Procedure: APPLICATION, GRAFT, SKIN, FULL-THICKNESS;  Surgeon: Lenard Alarcon MD;  [facility];  Service: Plastics;  Laterality: Left;    SURGICAL REMOVAL OF LESION OF FACE Right 10/7/2019    Procedure: EXCISION, LESION, FACE;  Surgeon: Lenard Alarcon MD;  [facility];  Service: Plastics;  Laterality: Right;       Review of patient's allergies indicates:  No Known Allergies  Current Facility-Administered Medications   Medication Frequency    0.9%  NaCl infusion (for blood administration) Q24H PRN    acetaminophen tablet 650 mg Q4H PRN    albuterol-ipratropium 2.5 mg-0.5 mg/3 mL nebulizer solution 3 mL Q6H PRN    aluminum-magnesium hydroxide-simethicone 200-200-20 mg/5 mL suspension 30 mL QID PRN    cholestyramine 4 gram packet 4 g BID    dextrose 10% bolus 125 mL PRN    dextrose 10% bolus 250 mL PRN    dicyclomine capsule 10 mg QID (AC & HS)    epoetin dyana-epbx injection 4,880 Units Every Mon, Wed, Fri    famotidine tablet 20 mg Daily    gabapentin capsule 100 mg TID    glucagon (human recombinant) injection 1 mg PRN    glucose chewable tablet 16 g PRN    glucose chewable tablet 24 g PRN    HYDROcodone-acetaminophen 5-325 mg per tablet 1 tablet Q4H PRN    HYDROmorphone injection 1 mg Q4H PRN    influenza (QUADRIVALENT ADJUVANTED PF) vaccine 0.5 mL vaccine x 1 dose    insulin aspart U-100 pen 0-5 Units QID (AC + HS)  PRN    Lactobacillus acidoph-L.bulgar 1 million cell tablet 4 tablet TID WM    linaCLOtide capsule 145 mcg Before breakfast    magnesium oxide tablet 400 mg BID    magnesium oxide tablet 400 mg Once    melatonin tablet 6 mg Nightly PRN    naloxone 0.4 mg/mL injection 0.02 mg PRN    NORepinephrine 4 mg in dextrose 5% 250 mL infusion (premix) (titrating) Continuous    ondansetron disintegrating tablet 4 mg Q6H PRN    ondansetron injection 4 mg Q8H    prochlorperazine injection Soln 5 mg Q6H PRN    sertraline tablet 25 mg Daily    simethicone chewable tablet 160 mg TID PRN    sodium bicarbonate tablet 1,300 mg TID    sodium chloride 0.9% flush 10 mL Q8H PRN    tacrolimus capsule 1 mg BID     Family History       Problem Relation (Age of Onset)    Cancer Father    Diabetes Father    Heart failure Father, Mother    Stroke Father          Tobacco Use    Smoking status: Former     Packs/day: 1.00     Years: 40.00     Pack years: 40.00     Types: Cigarettes     Quit date: 2013     Years since quittin.7    Smokeless tobacco: Never   Substance and Sexual Activity    Alcohol use: Yes     Alcohol/week: 6.0 standard drinks     Types: 6 Cans of beer per week     Comment: seldom    Drug use: No    Sexual activity: Never     Review of Systems   Constitutional:  Negative for fever.   Respiratory:  Negative for shortness of breath.    Cardiovascular:  Positive for leg swelling.   Gastrointestinal:  Negative for abdominal pain.   Genitourinary:  Positive for decreased urine volume.   Allergic/Immunologic: Positive for immunocompromised state (kidney transplant status).   Psychiatric/Behavioral:  Positive for confusion.    Objective:     Vital Signs (Most Recent):  Temp: 98.5 °F (36.9 °C) (10/07/22 1225)  Pulse: (!) 130 (10/07/22 1100)  Resp: (!) 25 (10/07/22 1233)  BP: (!) 86/49 (10/07/22 1100)  SpO2: (!) 73 % (10/07/22 1100)  O2 Device (Oxygen Therapy): room air (10/06/22 1904)   Vital Signs (24h Range):  Temp:  [97.5 °F  (36.4 °C)-99.5 °F (37.5 °C)] 98.5 °F (36.9 °C)  Pulse:  [] 130  Resp:  [10-44] 25  SpO2:  [73 %-100 %] 73 %  BP: ()/(39-58) 86/49     Weight: 86.2 kg (190 lb 0.6 oz) (10/07/22 1204)  Body mass index is 26.5 kg/m².  Body surface area is 2.08 meters squared.    I/O last 3 completed shifts:  In: 2250.6 [I.V.:1701.9; IV Piggyback:548.7]  Out: 675 [Urine:675]    Physical Exam  Vitals and nursing note reviewed.   Constitutional:       Comments: Some confusion   Cardiovascular:      Rate and Rhythm: Tachycardia present.      Heart sounds:     No friction rub.   Pulmonary:      Breath sounds: No wheezing or rales.   Musculoskeletal:         General: Swelling present.   Neurological:      Mental Status: He is alert.       Significant Labs:  reviewed    Assessment/Plan:     Active Diagnoses:    Diagnosis Date Noted POA    PRINCIPAL PROBLEM:  Acute osteomyelitis of left calcaneus [M86.172] 2022 Yes    Hypotension [I95.9] 10/06/2022 No    S/P BKA (below knee amputation), left [Z89.512] 10/06/2022 Not Applicable    Peripheral artery disease [I73.9] 2022 Yes    Infection of deep incisional surgical site after procedure [T81.42XA] 09/15/2022 Yes    MARIA INES (obstructive sleep apnea) [G47.33] 2020 Yes    Stage 3 chronic kidney disease [N18.30]  Yes    Long term current use of immunosuppressive drug [Z79.899] 2018 Not Applicable    Chronic obstructive pulmonary disease [J44.9] 2016 Yes    Coronary artery disease of native artery of native heart with stable angina pectoris [I25.118] 2016 Yes    Type 2 diabetes mellitus with hyperglycemia, with long-term current use of insulin [E11.65, Z79.4]  Not Applicable     donor kidney transplant for DM 16 [Z94.0]  Not Applicable     Chronic    Essential hypertension [I10] 2015 Yes      Problems Resolved During this Admission:    Diagnosis Date Noted Date Resolved POA    Hyponatremia [E87.1] 2022 Yes    H/O amputation  [Z89.9] 12/01/2016 10/05/2022 Yes       DINORAH on CKD kidney transplant status, baseline creatinine around 1.4mg/dL  - second DINORAH episode since admission  - associated with decreased UOP, etiology most consistent with hemodynamic compromise in setting of hypotension  - continue supportive care as doing and keep MAP >69mmHg    Anemia  - transfuse prn    Kidney transplant status  - continue Prograf   - continue to hold MMF    Hyponatremia  - monitor, no intervention required       Gianni Masterson MD  Nephrology

## 2022-10-07 NOTE — SUBJECTIVE & OBJECTIVE
Review of Systems    Constitutional:  Positive for activity change, appetite change and fatigue. Negative for chills, diaphoresis, fever and unexpected weight change.   HENT:  Negative for congestion, hearing loss, nosebleeds, postnasal drip, rhinorrhea and trouble swallowing.    Eyes:  Negative for discharge and visual disturbance.   Respiratory:  Negative for cough, chest tightness and shortness of breath.    Cardiovascular:  Negative for chest pain, palpitations and leg swelling.   Gastrointestinal:  Positive for abdominal distention. Negative for abdominal pain, constipation, diarrhea, nausea and vomiting.   Endocrine: Negative for cold intolerance and heat intolerance.   Genitourinary:  Negative for difficulty urinating, dysuria, frequency and hematuria.   Musculoskeletal:  Positive for back pain and gait problem. Negative for arthralgias and myalgias.   Skin:  Positive for wound.   Neurological:  Positive for weakness and light-headedness. Negative for dizziness and headaches.   Hematological:  Negative for adenopathy. Does not bruise/bleed easily.   Psychiatric/Behavioral:  Negative for agitation, behavioral problems and confusion. The patient is not nervous/anxious.      Objective:     Vital Signs (Most Recent):  Temp: 98.5 °F (36.9 °C) (10/07/22 1225)  Pulse: (!) 130 (10/07/22 1100)  Resp: (!) 25 (10/07/22 1233)  BP: (!) 86/49 (10/07/22 1100)  SpO2: (!) 73 % (10/07/22 1100)   Vital Signs (24h Range):  Temp:  [97.5 °F (36.4 °C)-99.5 °F (37.5 °C)] 98.5 °F (36.9 °C)  Pulse:  [] 130  Resp:  [10-44] 25  SpO2:  [73 %-100 %] 73 %  BP: ()/(39-58) 86/49     Weight: 86.2 kg (190 lb 0.6 oz)  Body mass index is 26.5 kg/m².    Intake/Output Summary (Last 24 hours) at 10/7/2022 1236  Last data filed at 10/7/2022 1100  Gross per 24 hour   Intake 2355.81 ml   Output 730 ml   Net 1625.81 ml      Physical Exam    Vitals and nursing note reviewed.   Constitutional:       General: He is awake.      Appearance:  He is overweight. He is ill-appearing. He is not toxic-appearing.   HENT:      Head: Atraumatic.   Eyes:      Conjunctiva/sclera: Conjunctivae normal.   Neck:      Vascular: No JVD.   Cardiovascular:      Rate and Rhythm: Normal rate and regular rhythm.      Pulses:           Radial pulses are 2+ on the right side and 2+ on the left side.   Pulmonary:      Effort: Pulmonary effort is normal.      Breath sounds: Decreased breath sounds and rhonchi present. No wheezing.   Abdominal:      General: There is no distension.      Palpations: Abdomen is soft.   Musculoskeletal:      Right lower leg: No edema.      Left lower leg: No edema.   Skin:     General: Skin is warm and dry.      Capillary Refill: Capillary refill takes 2 to 3 seconds.           Neurological:      Mental Status: He is alert.      GCS: GCS eye subscore is 4. GCS verbal subscore is 5. GCS motor subscore is 6.   Psychiatric:         Mood and Affect: Affect is angry.         Speech: Speech normal.         Behavior: Behavior is agitated.      Comments: Expressed desire not to discuss ROS or pain     Significant Labs: All pertinent labs within the past 24 hours have been reviewed.  CBC:   Recent Labs   Lab 10/06/22  1359 10/07/22  0501   WBC 7.51 8.93   HGB 8.1* 7.4*   HCT 27.4* 25.2*    346     CMP:   Recent Labs   Lab 10/06/22  0448 10/06/22  1359 10/07/22  0501   NA  --  131* 130*   K  --  4.9 4.8   CL  --  108 106   CO2  --  14* 12*   GLU  --  208* 150*   BUN  --  17 21   CREATININE 1.7* 2.1* 2.8*   CALCIUM  --  8.0* 8.2*   ANIONGAP  --  9 12       Significant Imaging:     Imaging Results              X-Ray Ankle Complete Left (Final result)  Result time 09/13/22 15:36:47      Final result by ANA CRISTINA Joseph Sr., MD (09/13/22 15:36:47)                   Impression:      1. There is no radiographic evidence of acute osteomyelitis.  2. There is minimal callus formation across the fractures in the distal aspect of the tibia. There is an external  fixator across the fractures of the tibia.  3. There is a mild amount of callus formation across a fracture in the distal portion of the fibula.      Electronically signed by: Porter Joseph MD  Date:    09/13/2022  Time:    15:36               Narrative:    EXAMINATION:  XR ANKLE COMPLETE 3 VIEW LEFT    CLINICAL HISTORY:  Other acute postprocedural pain    COMPARISON:  Comparison was made to plain film examinations of the left ankle dating back to 08/04/2022.    FINDINGS:  There is minimal callus formation across the fractures in the distal aspect of the tibia.  There is an external fixator across the fractures of the tibia.  There is a mild amount of callus formation across a fracture in the distal portion of the fibula.  There is no dislocation.  There is no radiographic evidence of acute osteomyelitis.  There are surgical changes associated with a prior amputation of the left forefoot.

## 2022-10-07 NOTE — EICU
eICU Physician Virtual/Remote Brief Evaluation Note      Telephone call RN  Patient has not voided since 6:00 a.m., bladder scan 900 mL   Chart reviewed, discussed with RN   Patient underwent left BKA amputation under general anesthesia today  Per RN patient was incontinent of urine preop   Woody catheter ordered      JODIE Mosqueda MD  eICU Attending  [phone]    This report has been created through the use of M-Modal dictation software. Typographical and content errors may occur with this process. While efforts are made to detect and correct such errors, in some cases errors will persist. For this reason, wording in this document should be considered in the proper context and not strictly verbatim

## 2022-10-07 NOTE — ASSESSMENT & PLAN NOTE
9/14/22: c/o of left ankle pain-controlled. Pt was scheduled for surgery, however, surgery was post-poned 2/2 hyponatremia -Na 126. WBC normal, afebrile. Blood cultures show NGTD. .3. cont IV Abx  09/15/22- removal of the external fixator and debridement of osteomyelitis planned for today- procedure postponed due to hyponatremia- Lasix given - Nephrology following   9/16/22 - procedure postponed due to hyperglycemia  9/17/22 - post op day 1 - ABX in progress  9/19/22 - post op day 3 - Cefepime continued, Zyvox stopped. NWB. Wound care consulted for wound vac changes  9/20/22 - bone cultures pending. Aerobic culture isolated proteus mirabils  9/21/22 - Proteus mirabilis and B. faecis - Unasyn  9/22/22 - 6 weeks of Rocephin and Flagyl per Dr. Veliz  9/24/22 Vascular surgery was unable to complete the angiogram yesterday d/t refusing it. Will await further recs by Vascular surgery. Continue treatment with IV ABX  9/25/22 Ortho is recommending a BKA per vascular surgery. Awaiting Vascular surgery recs. Continue current management with IV ABX.   9/26/22 The patient is alert and oriented today. He reports that he does not remember refusing the angiogram on 9/23/22. He reports that he is agreeable to the procedure. Vascular Surgery was reconsulted today. Ortho is following. Continue IV ABX. Wound vac is in place.   9/27/22 Vascular surgery reconsulted and plans to do angiogram on 9/29/22 to evaluate for reperfusion vs amputation. Ortho is following.   9/28/22 Vascular surgery plans for a LLE angiogram in AM. NPO after MN. Ortho following   9/29/22 The patients renal function improved with IVF's. Plans for angiogram of LLE today with Vascular surgery to determine if revascularization is possible or if the patient will need a BKA. Will consult PT/OT for recs to assist with placement.   10/1/22 patient febrile ON, septic workup ordered  10/2: BC: NGTD, Afebrile overnight  10/4- BKA planned for 10-6, CM working on  placement following BKA  10/6: Left BKA completed    10/7:    Afebrile, no leukocytosis, status post BKA, received antibiotics for total duration of 25 days- discussed with ID --> considering BKA Dr. Veliz recommended to discontinue antibiotics.

## 2022-10-07 NOTE — ASSESSMENT & PLAN NOTE
Creatinine improved  Awaiting angio- completed  Continue tacrolimus- weekly level      10/7:    Noted to have decreased urine output, creatinine trending up, nephrology notified, follow-up on recommendations.

## 2022-10-07 NOTE — ASSESSMENT & PLAN NOTE
Hypotensive following surgery, treated with IV fluid bolus with no improvement, placed on Levophed drip, transferred to ICU    --Vitals per Unit protocol  --Continue Levophed at this time  --Hold HTN medications  --Consult Critical Care    10/7:    Status post BKA as of 10/06/2022- became hypotensive and has been transferred to ICU on Levophed.  Currently on Levophed.    Hemoglobin dropped down to 7.7, 1 unit of PRBC ordered.  Follow up on repeat labs.    After transfusion, IVF--> if BP stabilizes possible downgrade to floor;

## 2022-10-07 NOTE — EICU
Rounding (Video Assessment):  No    Intervention Initiated From:  Bedside    Mikey Communicated with Bedside Nurse regarding:  Other    Nurse Notified:  Yes    Doctor Notified:  Yes    Comments: RN called. Bladder scanned pt since he has not urinated since 6am. had 900ml in bladder. RN would like a grullon order if appropriate

## 2022-10-07 NOTE — PROGRESS NOTES
[facility] - Intensive Care ([facility])  Critical Care Medicine  Progress Note    Patient Name: Lavelle Ladd  MRN: [id number]  Admission Date: 9/13/2022  Hospital Length of Stay: 23 days  Code Status: DNR  Attending Provider: Donal Mcdonald MD  Primary Care Provider: Primary Doctor No   Principal Problem: Acute osteomyelitis of left calcaneus    Subjective:     HPI:  66 yo male with HTN, CAD, DM, PVD, kidney transplant 2016-now with CKD3, COPD, Right BKA, Left transmetatarsal amputation, and recent Closed Fracture pf left tibia/fibula s/p closed reduction left tibial and fibular shaft fractures with application of external fixation device on 8/1/22 who was admitted with Left ankle wound infection at ext-fix pin site with calcaneus osteomyelitis on IV Vanc and Cefepime    9/17:   OR and Ex-fix hardware removed.  Antibiotic beads and wound vac placed.  Fluoro revealed unhealed fractures.  Was likely to need amputation.    Over next several days, was attempted to be tuned up from renal perspective.      9/21/22 - Arterial studies to RLE noted with hemodynamically significant stenosis suspected within the proximal superficial femoral artery. Vascular consulted for possible angiography.    9/23: Pt refused angiogram.    9/29: pt ultimately underwent angiogram and no targets for revascularization by endovascular or bypass identified.    Ortho ultimately recommended BKA.    10/6:  Pt underwent LLE BKA today.  While in PACU was hypotensive and now transferred to ICU.  BP now improved.  PT awake and interactive.    On arrival in ICU, pt is somewhat irriatble and not the best historian.  He states earlier he had abdominal discomfort with some nausea however that has since improved.  He specifically denies any fevers, sweats, chills.      Hospital/ICU Course:  10/6:  Hypotensive with SBP in 60s in PACU.  Now transferred to ICU.  10/7 - remains on low dose levophed for BP support. H/H down this am. Complains of uncontrolled pain but  will not discuss further on attempt to determine if dose/med/or frequency change would be most helpful            Review of patient's allergies indicates:  No Known Allergies    Review of Systems   Reason unable to perform ROS: complaint of pain but refuses further discussion or full ROS.   Objective:     Vital Signs (Most Recent):  Temp: 99.5 °F (37.5 °C) (10/07/22 0315)  Pulse: 90 (10/07/22 0615)  Resp: 16 (10/07/22 0828)  BP: (!) 98/49 (10/07/22 0615)  SpO2: 97 % (10/07/22 0615)   Vital Signs (24h Range):  Temp:  [97.5 °F (36.4 °C)-99.5 °F (37.5 °C)] 99.5 °F (37.5 °C)  Pulse:  [67-96] 90  Resp:  [10-44] 16  SpO2:  [89 %-100 %] 97 %  BP: ()/(30-56) 98/49     Weight: 98.5 kg (217 lb 2.5 oz)  Body mass index is 30.29 kg/m².      Intake/Output Summary (Last 24 hours) at 10/7/2022 0829  Last data filed at 10/7/2022 0600  Gross per 24 hour   Intake 2235.82 ml   Output 645 ml   Net 1590.82 ml      NORepinephrine bitartrate-D5W 0.04 mcg/kg/min (10/07/22 0600)         Physical Exam  Vitals and nursing note reviewed.   Constitutional:       General: He is awake.      Appearance: He is overweight. He is ill-appearing. He is not toxic-appearing.   HENT:      Head: Atraumatic.   Eyes:      Conjunctiva/sclera: Conjunctivae normal.   Neck:      Vascular: No JVD.   Cardiovascular:      Rate and Rhythm: Normal rate and regular rhythm.      Pulses:           Radial pulses are 2+ on the right side and 2+ on the left side.   Pulmonary:      Effort: Pulmonary effort is normal.      Breath sounds: Decreased breath sounds and rhonchi present. No wheezing.   Abdominal:      General: There is no distension.      Palpations: Abdomen is soft.   Musculoskeletal:      Right lower leg: No edema.      Left lower leg: No edema.   Skin:     General: Skin is warm and dry.      Capillary Refill: Capillary refill takes 2 to 3 seconds.          Neurological:      Mental Status: He is alert.      GCS: GCS eye subscore is 4. GCS verbal subscore  is 5. GCS motor subscore is 6.   Psychiatric:         Mood and Affect: Affect is angry.         Speech: Speech normal.         Behavior: Behavior is agitated.      Comments: Expressed desire not to discuss ROS or pain       Vents:  Oxygen Concentration (%): 21 (22 2100)    Lines/Drains/Airways       Peripherally Inserted Central Catheter Line  Duration             PICC Double Lumen 22 1738 right basilic 18 days              Drain  Duration                  Hemodialysis AV Fistula 22 0717 Left upper arm 23 days         Urethral Catheter 10/06/22 2230 <1 day                    Significant Labs:    CBC/Anemia Profile:  Recent Labs   Lab 10/06/22  1359 10/07/22  0501   WBC 7.51 8.93   HGB 8.1* 7.4*   HCT 27.4* 25.2*    346   MCV 95 95   RDW 16.3* 16.4*        Chemistries:  Recent Labs   Lab 10/06/22  0448 10/06/22  1359 10/07/22  0501   NA  --  131* 130*   K  --  4.9 4.8   CL  --  108 106   CO2  --  14* 12*   BUN  --  17 21   CREATININE 1.7* 2.1* 2.8*   CALCIUM  --  8.0* 8.2*   MG  --   --  1.8         Significant Imaging:   I have reviewed all pertinent imaging results/findings within the past 24 hours.      ABG  No results for input(s): PH, PO2, PCO2, HCO3, BE in the last 168 hours.  Assessment/Plan:     Pulmonary  Chronic obstructive pulmonary disease  Not in exacerbation  -nebulizer treaments prn  -supplemental oxygen as needed     Cardiac/Vascular  Hypotension  Afebrile and no leukocytosis post-op  Hgb 7.4 today from 8.1 on 10/6 & 9.5 on 10/3.  Will transfuse   No obvious sign of bleeding.  Continue ICU hemodynamic monitoring  Titrate pressor for goal MAP > 65    Renal/   donor kidney transplant for DM 16  S/p kidney transplant   tacrolimus continued  Was also on mycophenylate earlier this hospitalization; discontinued  by renal due to infection.  Will need to clarify regimen  Also creatinine up 2.8  Renal had signed off. asked renal to review and make new recs, will see  today    ID  * Acute osteomyelitis of left calcaneus  S/p BKA 10/6    Endocrine  Type 2 diabetes mellitus with hyperglycemia, with long-term current use of insulin  SSI prn with monitoring for glucose control and prevention of insulin toxicity    Other  MARIA INES (obstructive sleep apnea)  CPAP [facility]       Critical Care Daily Checklist:    A: Awake: RASS Goal/Actual Goal: RASS Goal: 0-->alert and calm  Actual: Villavicencio Agitation Sedation Scale (RASS): Alert and calm   B: Spontaneous Breathing Trial Performed?     C: SAT & SBT Coordinated?  n/a                      D: Delirium: CAM-ICU Overall CAM-ICU: Negative   E: Early Mobility Performed? Yes   F: Feeding Goal: Goals: 1. Pt diet order will be advanced with 24-48 hours. 2. Pt will consume >75% of energy needs by RD follow up.  Status: Nutrition Goal Status: new   Current Diet Order   Procedures    Diet diabetic [facility]; 1500 Calorie; Cardiac (Low Na/Chol)     Order Specific Question:   Indicate patient location for additional diet options:     Answer:   [facility]     Order Specific Question:   Total calories:     Answer:   1500 Calorie     Order Specific Question:   Additional Diet Options:     Answer:   Cardiac (Low Na/Chol)      AS: Analgesia/Sedation prn   T: Thromboembolic Prophylaxis Holding with transfusion today   H: HOB > 300 Yes   U: Stress Ulcer Prophylaxis (if needed) pepcid   G: Glucose Control SSI prn   B: Bowel Function Stool Occurrence: 1   I: Indwelling Catheter (Lines & Woody) Necessity reviewed   D: De-escalation of Antimicrobials/Pharmacotherapies reviewed    Plan for the day/ETD Transfuse, support BP    Code Status:  Family/Goals of Care: DNR  Spouse at bedside    I have discussed case and plan of care in detail with Dr Arcos; Status and plan of care were discussed with team on multidisciplinary rounds.    Critical Care Time: 50 minutes  Critical secondary to hypotension, anemia requiring pressor support and transfusion   Critical care  was time spent personally by me on the following activities: development of treatment plan with patient or surrogate and bedside caregivers, discussions with consultants, evaluation of patient's response to treatment, examination of patient, ordering and performing treatments and interventions, ordering and review of laboratory studies, ordering and review of radiographic studies, pulse oximetry, re-evaluation of patient's condition. This critical care time did not overlap with that of any other provider or involve time for any procedures.     FIDEL Miller-BC  Critical Care Medicine  [facility] - [facility] ([facility])

## 2022-10-07 NOTE — PT/OT/SLP EVAL
"Physical Therapy Evaluation    Patient Name:  Lavelle Ladd   MRN:  [id number]    Recommendations:     Discharge Recommendations:  nursing facility, skilled  Discharge Equipment Recommendations: none   Barriers to discharge: None    Assessment:     Lavelle Ladd is a 67 y.o. male admitted with a medical diagnosis of Acute osteomyelitis of left calcaneus.  He presents with the following impairments/functional limitations:  weakness, impaired endurance, impaired functional mobility, gait instability, impaired balance, decreased coordination, pain, decreased safety awareness which increase caregiver burden and increased risk of issues related to immobility.    Rehab Prognosis: Good; patient would benefit from acute skilled PT services to address these deficits and reach maximum level of function.    Recent Surgery: Procedure(s) (LRB):  AMPUTATION, BELOW KNEE (Left) 1 Day Post-Op    Plan:     During this hospitalization, patient to be seen 3 x/week to address the identified rehab impairments via therapeutic activities, therapeutic exercises, wheelchair management/training, neuromuscular re-education and progress toward the following goals:    Plan of Care Expires:  10/21/22    Subjective     Chief Complaint: acute osteomyelitis of L calcaneous s/p BKA with past hx of BKA  Patient/Family Comments/goals: to go home and "don't touch my calf"  Pain/Comfort:  Pain Rating 1:  (pain with any palpation to BLE (L more painful than R))  Pain Addressed 1: Pre-medicate for activity, Reposition, Distraction, Nurse notified    Patients cultural, spiritual, Mormon conflicts given the current situation: no    Living Environment:  Pt lives with girlfriend in a 1 story house with a ramp to enter. Pt reports (I) with dressing. Mod (I) with bathing using a bath bench and mobility using a wheelchair. Pt can t/f to wheelchair (I). Pt WC-bound but reports being bed-bound the last month. Pt has a RLE prosthesis that he rarely uses. " Equipment Used at home:  wheelchair, bedside commode, bath bench, grab bar, power chair Assistance upon Discharge: unknown      Objective:     Communicated with nurse Mcbride prior to session.  Patient found supine with peripheral IV, telemetry, pulse ox (continuous), blood pressure cuff, grullon catheter, PICC line  upon PT entry to room.    General Precautions: Standard, fall   Orthopedic Precautions: (new L BKA)   Braces: Knee immobilizer  Respiratory Status: Room air    Exams:  BLE ROM and strength limited secondary to limb loss below knee. B hip ROM and strength impaired.    Functional Mobility:  Bed Mobility:     Rolling Left:  total assistance and of 2 persons  Rolling Right: total assistance and of 2 persons  Scooting: total assistance and of 2 persons  Supine to Sit: total assistance and of 2 persons  Sit to Supine: total assistance and of 2 persons    Therapeutic Activities and Exercises:   Facilitated sitting EOB x 7-10 mins with mod/max A to maintain sitting balance. Increased backward leaning while EOB which decreased safety. Cuing provided for proper body mechanics and positioning to sit unsupported in bed but pt demonstrated limited ability to perform tasks. BP while supine 90/40s and during sitting decreased to 80/40s.     AM-PAC 6 CLICK MOBILITY  Total Score:8     Patient left supine with all lines intact, call button in reach, bed alarm on, and nursing notified.    GOALS:   Multidisciplinary Problems       Physical Therapy Goals          Problem: Physical Therapy    Goal Priority Disciplines Outcome Goal Variances Interventions   Physical Therapy Goal     PT, PT/OT      Description: Pt will perform bed mobility independently in order to participate in EOB activity.  Pt will perform transfers SBA with sliding board in order to participate in OOB activity.   Pt will perform w/c mobility with SUP in order to participate in daily tasks.    Pt will tolerate sitting OOB x 3-4 hrs to participate in daily  tasks.                       History:     Past Medical History:   Diagnosis Date    DINORAH (acute kidney injury) 2016    Arthritis     CAD in native artery 2019    CHF (congestive heart failure)     Chronic obstructive pulmonary disease 2016    Coronary artery disease involving native coronary artery of native heart without angina pectoris 2016    CRI (chronic renal insufficiency) 2019     donor kidney transplant for DM 16     Induction with Thymo x3 and IV solumedrol to total 875mg  Kidney Biopsy  2016: 16 glomeruli, ACR type 1 AVR type 2, significant microcirculatory changes, c4d negative, No DSA, 5 to10% fibrosis. Treated with thymo x8 2016- no rejection      Diastolic heart failure     Encounter for blood transfusion     ESRD on RRT since 10/2013 10/29/2013    Biopsy proven diabetic nephropathy and lymphoplasmacytic interstitial infiltrate not c/w with AIN (ddx sjogrens or assoc with tamm-horsefall protein extravasation)     GERD (gastroesophageal reflux disease)     History of hepatitis C, s/p successful Rx w/ SVR12 - 2017    Completed 12 weeks harvoni w/ SVR    Hyperlipidemia     Hypertension     PAD (peripheral artery disease) 2019    PIC line (peripherally inserted central catheter) flush     Prophylactic immunotherapy     Proteinuria     PVD (peripheral vascular disease) 2017    RLE BKA CT 16 Extensive atherosclerotic disease of the aorta and mesenteric arteries.     Renal hypertension     Type 2 diabetes mellitus with diabetic neuropathy, with long-term current use of insulin 2016    Vitamin B12 deficiency        Past Surgical History:   Procedure Laterality Date    ANGIOGRAPHY OF LOWER EXTREMITY N/A 2022    Procedure: ANGIOGRAM, LOWER EXTREMITY/left leg;  Surgeon: Donal Mcdonald MD;  Location: [facility] CATH LAB;  Service: Cardiovascular;  Laterality: N/A;    ANGIOGRAPHY OF LOWER EXTREMITY N/A 2022    Procedure: ANGIOGRAM,  LOWER EXTREMITY/left leg;  Surgeon: Donal Mcdonald MD;  Location: [facility] CATH LAB;  Service: Peripheral Vascular;  Laterality: N/A;  resched from 9/23 pt ready now    AORTOGRAPHY WITH SERIALOGRAPHY N/A 6/14/2018    Procedure: LEFT LEG ANGIOGRAM;  Surgeon: Donal Mcdonald MD;  Location: [facility] CATH LAB;  Service: Vascular;  Laterality: N/A;    APPLICATION OF LARGE EXTERNAL FIXATION DEVICE TO TIBIA Left 8/4/2022    Procedure: APPLICATION, EXTERNAL FIXATION DEVICE, LARGE, TIBIA;  Surgeon: Good Villa MD;  Location: [facility];  Service: Orthopedics;  Laterality: Left;    av bovine graft      Left UE    AV FISTULA PLACEMENT      left UE    BELOW KNEE AMPUTATION OF LOWER EXTREMITY Left 10/6/2022    Procedure: AMPUTATION, BELOW KNEE;  Surgeon: Donal Mcdonald MD;  Location: [facility];  Service: Vascular;  Laterality: Left;    CARDIAC CATHETERIZATION  02/2015    CLOSED REDUCTION OF INJURY OF TIBIA Left 8/4/2022    Procedure: CLOSED REDUCTION, TIBIA;  Surgeon: Good Villa MD;  Location: [facility];  Service: Orthopedics;  Laterality: Left;  Closed reduction left tibial and fibular shaft fractures    CLOSURE OF WOUND Left 9/24/2018    Procedure: CLOSURE, WOUND;  Surgeon: Karla Wheeler DPM;  Location: [facility] OR;  Service: Podiatry;  Laterality: Left;  Secondary Wound closure, extensive    CLOSURE OF WOUND Left 11/5/2018    Procedure: CLOSURE, WOUND;  Surgeon: Karla Wheeler DPM;  Location: [facility];  Service: Podiatry;  Laterality: Left;    DEBRIDEMENT OF MULTIPLE METATARSAL BONES Left 11/5/2018    Procedure: DEBRIDEMENT, METATARSAL BONE, 2 OR MORE BONES;  Surgeon: Karla Wheeler DPM;  Location: [facility];  Service: Podiatry;  Laterality: Left;    EXCISION OF SKIN Left 9/27/2019    Procedure: EXCISION, SKIN;  Surgeon: Lenard Alarcon MD;  Location: [address];  Service: Plastics;  Laterality: Left;  Plastics set, NIMS monitor, ACell    FOOT AMPUTATION THROUGH METATARSAL Left 9/21/2018    Procedure: AMPUTATION, FOOT,  TRANSMETATARSAL;  Surgeon: Karla Wheeler DPM;  Location: [facility] OR;  Service: Podiatry;  Laterality: Left;    FOOT AMPUTATION THROUGH METATARSAL Left 10/31/2018    Procedure: AMPUTATION, FOOT, TRANSMETATARSAL;  Surgeon: Karla Wheeler DPM;  Location: [facility] OR;  Service: Podiatry;  Laterality: Left;    FOOT AMPUTATION THROUGH METATARSAL Left 11/5/2018    Procedure: AMPUTATION, FOOT, TRANSMETATARSAL;  Surgeon: Karla Wheeler DPM;  Location: [facility] OR;  Service: Podiatry;  Laterality: Left;  revisional transmetatarsal amputation, Left foot    IRRIGATION AND DEBRIDEMENT OF LOWER EXTREMITY Left 10/31/2018    Procedure: IRRIGATION AND DEBRIDEMENT, LOWER EXTREMITY;  Surgeon: Karla Wheeler DPM;  Location: [facility] OR;  Service: Podiatry;  Laterality: Left;    KIDNEY TRANSPLANT  05/21/2016    LEFT HEART CATHETERIZATION Left 7/21/2019    Procedure: CATHETERIZATION, HEART, LEFT;  Surgeon: Andrew Valdez MD;  Location: [facility] CATH LAB;  Service: Cardiology;  Laterality: Left;    LEG AMPUTATION THROUGH KNEE  2011    right LE, started as nail puncture leading to diabetic ulcer    REMOVAL OF EXTERNAL FIXATION DEVICE Left 9/17/2022    Procedure: REMOVAL, EXTERNAL FIXATION DEVICE;  Surgeon: Kathleen Zazueta MD;  Location: [facility] OR;  Service: Orthopedics;  Laterality: Left;    SKIN FULL THICKNESS GRAFT Left 10/7/2019    Procedure: APPLICATION, GRAFT, SKIN, FULL-THICKNESS;  Surgeon: Lenard Alarcon MD;  [address]  Service: Plastics;  Laterality: Left;    SURGICAL REMOVAL OF LESION OF FACE Right 10/7/2019    Procedure: EXCISION, LESION, FACE;  Surgeon: Lenard Alarcon MD;  Location: [facility] OR 2ND FLR;  Service: Plastics;  Laterality: Right;       Time Tracking:     PT Received On: 10/07/22  PT Start Time: 1030     PT Stop Time: 1105  PT Total Time (min): 35 min     Billable Minutes: Evaluation 15 and Therapeutic Activity 20      10/07/2022

## 2022-10-07 NOTE — PLAN OF CARE
EVAL AND TX COMPLETED: facilitated bed mobility total A x 2, tolerated sitting EOB x 7-10 mins with mod/max A to maintain balance. Recommend SNF

## 2022-10-07 NOTE — PLAN OF CARE
Nutrition Recommendation 10/7:  1. Recommend continue Diabetic/Cardiac diet   2. Recommend continue Boost Glucose Control TID   3. Recommend continue Arginaid BID for wound healing   4. Recommend feeding assistance, encourage PO intake   5. When medically appropriate, recommend appeitie stimulant  6. Collaboration of care with medical providers  Sherri Heard Dietitibing

## 2022-10-07 NOTE — PLAN OF CARE
OT sanjay completed. Sup>sit total A of 2, static sitting EOB with min-mod A, sit>sup total A of 2. Recommending SNF at d/c.

## 2022-10-08 NOTE — SUBJECTIVE & OBJECTIVE
Review of patient's allergies indicates:  No Known Allergies    Review of Systems   Reason unable to perform ROS: limited, confusion.   Constitutional:  Positive for fatigue.   Respiratory:  Negative for shortness of breath.    Cardiovascular:  Negative for chest pain.   Psychiatric/Behavioral:  The patient is nervous/anxious.    Objective:     Vital Signs (Most Recent):  Temp: 99.1 °F (37.3 °C) (10/09/22 0300)  Pulse: 92 (10/09/22 0600)  Resp: (!) 21 (10/09/22 0600)  BP: (!) 90/46 (10/09/22 0600)  SpO2: (!) 92 % (10/09/22 0600)   Vital Signs (24h Range):  Temp:  [98.8 °F (37.1 °C)-99.5 °F (37.5 °C)] 99.1 °F (37.3 °C)  Pulse:  [77-95] 92  Resp:  [13-41] 21  SpO2:  [88 %-98 %] 92 %  BP: ()/(39-86) 90/46     Weight: 85.3 kg (188 lb 0.8 oz)  Body mass index is 26.23 kg/m².      Intake/Output Summary (Last 24 hours) at 10/9/2022 0641  Last data filed at 10/9/2022 0600  Gross per 24 hour   Intake 1608.41 ml   Output 318 ml   Net 1290.41 ml        sodium chloride 0.9% 50 mL/hr at 10/09/22 0600    NORepinephrine bitartrate-D5W 0.04 mcg/kg/min (10/09/22 0600)         Physical Exam  Vitals and nursing note reviewed.   Constitutional:       General: He is awake.      Appearance: He is overweight. He is ill-appearing. He is not toxic-appearing.   HENT:      Head: Atraumatic.   Eyes:      Conjunctiva/sclera: Conjunctivae normal.   Neck:      Vascular: No JVD.   Cardiovascular:      Rate and Rhythm: Normal rate and regular rhythm.      Pulses:           Radial pulses are 2+ on the right side and 2+ on the left side.   Pulmonary:      Effort: Pulmonary effort is normal.      Breath sounds: Decreased breath sounds and rhonchi present. No wheezing.   Abdominal:      General: There is no distension.      Palpations: Abdomen is soft.   Musculoskeletal:      Right lower leg: No edema.      Left lower leg: No edema.   Skin:     General: Skin is warm and dry.      Capillary Refill: Capillary refill takes 2 to 3 seconds.           Neurological:      Mental Status: He is alert.      GCS: GCS eye subscore is 4. GCS verbal subscore is 4. GCS motor subscore is 6.   Psychiatric:         Attention and Perception: He is inattentive.         Mood and Affect: Affect is flat.         Speech: Speech normal.         Judgment: Judgment is impulsive.       Vents:  Oxygen Concentration (%): 28 (10/08/22 2015)    Lines/Drains/Airways       Peripherally Inserted Central Catheter Line  Duration             PICC Double Lumen 09/18/22 1738 right basilic 20 days              Drain  Duration                  Hemodialysis AV Fistula 09/14/22 0717 Left upper arm 24 days         Urethral Catheter 10/06/22 2230 2 days                    Significant Labs:    CBC/Anemia Profile:  Recent Labs   Lab 10/08/22  0154 10/08/22  0857 10/09/22  0411   WBC 10.57 10.89 13.05*   HGB 8.4* 8.3* 9.3*   HCT 27.5* 27.5* 30.6*    293 342   MCV 92 94 92   RDW 17.0* 17.2* 17.2*          Chemistries:   Recent Labs   Lab 10/08/22  0154 10/08/22  0343 10/08/22  0857 10/09/22  0411 10/09/22  0656   *  --  129*  --  132*   K 4.5  --  4.7  --  4.9     --  104  --  105   CO2 14*  --  13*  --  12*   BUN 22  --  22  --  23   CREATININE 3.6*  --  3.5*  --  3.7*   CALCIUM 7.8*  --  7.4*  --  7.8*   ALBUMIN 2.0*  --   --   --  1.9*   PROT 5.5*  --   --   --  5.0*   BILITOT 0.3  --   --   --  0.3   ALKPHOS 102  --   --   --  100   ALT <5*  --   --   --  <5*   AST 10  --   --   --  6*   MG 1.7 1.8  --  2.1  --          Significant Imaging:   I have reviewed all pertinent imaging results/findings within the past 24 hours.  Review of Systems   Reason unable to perform ROS: limited, confusion.   Constitutional:  Positive for fatigue.   Respiratory:  Negative for shortness of breath.    Cardiovascular:  Negative for chest pain.   Psychiatric/Behavioral:  The patient is nervous/anxious.

## 2022-10-08 NOTE — ASSESSMENT & PLAN NOTE
9/14/22: c/o of left ankle pain-controlled. Pt was scheduled for surgery, however, surgery was post-poned 2/2 hyponatremia -Na 126. WBC normal, afebrile. Blood cultures show NGTD. .3. cont IV Abx  09/15/22- removal of the external fixator and debridement of osteomyelitis planned for today- procedure postponed due to hyponatremia- Lasix given - Nephrology following   9/16/22 - procedure postponed due to hyperglycemia  9/17/22 - post op day 1 - ABX in progress  9/19/22 - post op day 3 - Cefepime continued, Zyvox stopped. NWB. Wound care consulted for wound vac changes  9/20/22 - bone cultures pending. Aerobic culture isolated proteus mirabils  9/21/22 - Proteus mirabilis and B. faecis - Unasyn  9/22/22 - 6 weeks of Rocephin and Flagyl per Dr. Veliz  9/24/22 Vascular surgery was unable to complete the angiogram yesterday d/t refusing it. Will await further recs by Vascular surgery. Continue treatment with IV ABX  9/25/22 Ortho is recommending a BKA per vascular surgery. Awaiting Vascular surgery recs. Continue current management with IV ABX.   9/26/22 The patient is alert and oriented today. He reports that he does not remember refusing the angiogram on 9/23/22. He reports that he is agreeable to the procedure. Vascular Surgery was reconsulted today. Ortho is following. Continue IV ABX. Wound vac is in place.   9/27/22 Vascular surgery reconsulted and plans to do angiogram on 9/29/22 to evaluate for reperfusion vs amputation. Ortho is following.   9/28/22 Vascular surgery plans for a LLE angiogram in AM. NPO after MN. Ortho following   9/29/22 The patients renal function improved with IVF's. Plans for angiogram of LLE today with Vascular surgery to determine if revascularization is possible or if the patient will need a BKA. Will consult PT/OT for recs to assist with placement.   10/1/22 patient febrile ON, septic workup ordered  10/2: BC: NGTD, Afebrile overnight  10/4- BKA planned for 10-6, CM working on  placement following BKA  10/6: Left BKA completed    10/7:    Afebrile, no leukocytosis, status post BKA, received antibiotics for total duration of 25 days- discussed with ID --> considering BKA Dr. Veliz recommended to discontinue antibiotics.     10/8- s/p L BKA

## 2022-10-08 NOTE — PROGRESS NOTES
Confusion and agitation increased overnight. Pulling/reaching for things that are not there. EICU notified. Labs reviewed. ABG completed. Orders placed.

## 2022-10-08 NOTE — EICU
Mild agitation  Check labs  Try melatonin  May need benzodiazepine if anxiety worse  No delirium symptoms reported  Check LA    0325 Met acidosis on labs; on bicarb oral; slight agitation reported; Na 128 was 130; low dextrose IV in levophed; try low dose ativan and see if settles else may need haldol; replete Mg  D/w RN

## 2022-10-08 NOTE — ASSESSMENT & PLAN NOTE
Increase levemir dose and frequency  Continue SSI with monitoring for glucose control and prevention of insulin toxicity

## 2022-10-08 NOTE — ASSESSMENT & PLAN NOTE
Hypotensive following surgery, treated with IV fluid bolus with no improvement, placed on Levophed drip, transferred to ICU    --Vitals per Unit protocol  --Continue Levophed at this time  --Hold HTN medications  --Consult Critical Care    10/7:    Status post BKA as of 10/06/2022- became hypotensive and has been transferred to ICU on Levophed.  Currently on Levophed.    Hemoglobin dropped down to 7.7, 1 unit of PRBC ordered.  Follow up on repeat labs.    After transfusion, IVF--> if BP stabilizes possible downgrade to floor;     10/8- still hypotensive on Levophed, added Midodrine

## 2022-10-08 NOTE — PROGRESS NOTES
New consult received for Mr Ladd. Patient now with left BKA. Surgical dressing in place. Patient turned to right side with primary nurse. Buttocks, sacrum, upper thighs near buttocks with MASD and superimposed fungal rash. Mostly red and intact but some scattered areas of partial thickness tissue loss with moist red wound bed. Antifungal ordered. Old pressure injury to upper buttock lateral buttock near hip (previous admission)  now epithelialized. Patient has been uncooperative, refusing turning and offloading. Will continue to follow.

## 2022-10-08 NOTE — PROGRESS NOTES
[facility] - Intensive Care ([facility])  Critical Care Medicine  Progress Note    Patient Name: Lavelle Ladd  MRN: [id number]  Admission Date: 9/13/2022  Hospital Length of Stay: 24 days  Code Status: DNR  Attending Provider: Donal Mcdonald MD  Primary Care Provider: Primary Doctor No   Principal Problem: Acute osteomyelitis of left calcaneus    Subjective:     HPI:  68 yo male with HTN, CAD, DM, PVD, kidney transplant 2016-now with CKD3, COPD, Right BKA, Left transmetatarsal amputation, and recent Closed Fracture pf left tibia/fibula s/p closed reduction left tibial and fibular shaft fractures with application of external fixation device on 8/1/22 who was admitted with Left ankle wound infection at ext-fix pin site with calcaneus osteomyelitis on IV Vanc and Cefepime    9/17:   OR and Ex-fix hardware removed.  Antibiotic beads and wound vac placed.  Fluoro revealed unhealed fractures.  Was likely to need amputation.    Over next several days, was attempted to be tuned up from renal perspective.      9/21/22 - Arterial studies to RLE noted with hemodynamically significant stenosis suspected within the proximal superficial femoral artery. Vascular consulted for possible angiography.    9/23: Pt refused angiogram.    9/29: pt ultimately underwent angiogram and no targets for revascularization by endovascular or bypass identified.    Ortho ultimately recommended BKA.    10/6:  Pt underwent LLE BKA today.  While in PACU was hypotensive and now transferred to ICU.  BP now improved.  PT awake and interactive.    On arrival in ICU, pt is somewhat irriatble and not the best historian.  He states earlier he had abdominal discomfort with some nausea however that has since improved.  He specifically denies any fevers, sweats, chills.    Hospital/ICU Course:  10/6:  Hypotensive with SBP in 60s in PACU.  Now transferred to ICU.  10/7:  remains on low dose levophed for BP support. H/H down some and received 1un RBC. Complains of  uncontrolled pain but will not discuss further on attempt to determine if dose/med/or frequency change would be most helpful  10/8:  Midodrine started.  Attempt to wean norepi off.  Hgb 8.4 today.  Ongoing metabolic acidosis of unclear etiology.  AMS last night consistent with delirium.  DC Ativan PRN.  Change Norco PO PRN to Dilaudid PO PRN in setting of his renal dysfunction.  Missed morning midodrine dose due to his delirium and not suitable for PO at that time.    Interval History:  Pt endorses generalized aches and pain at his amputation site.  Denies HA, CP, SOB, abd pain, nausea, vomiting, diarrhea, fevers, sweats, chills.  He is otherwise lethargic.    Objective:     Vital Signs (Most Recent):  Temp: 97.7 °F (36.5 °C) (10/08/22 0315)  Pulse: 88 (10/08/22 0500)  Resp: 12 (10/08/22 0500)  BP: (!) 112/53 (10/08/22 0500)  SpO2: 96 % (10/08/22 0500)   Vital Signs (24h Range):  Temp:  [97.6 °F (36.4 °C)-98.7 °F (37.1 °C)] 97.7 °F (36.5 °C)  Pulse:  [] 88  Resp:  [12-44] 12  SpO2:  [73 %-99 %] 96 %  BP: ()/(39-82) 112/53     Weight: 86.2 kg (190 lb 0.6 oz)  Body mass index is 26.5 kg/m².      Intake/Output Summary (Last 24 hours) at 10/8/2022 0541  Last data filed at 10/8/2022 0500  Gross per 24 hour   Intake 975.47 ml   Output 410 ml   Net 565.47 ml       Physical Exam  Vitals reviewed.   Constitutional:       Appearance: Normal appearance.   HENT:      Head: Normocephalic and atraumatic.      Nose: Nose normal.   Eyes:      Extraocular Movements: Extraocular movements intact.      Pupils: Pupils are equal, round, and reactive to light.   Cardiovascular:      Rate and Rhythm: Normal rate and regular rhythm.   Pulmonary:      Effort: Pulmonary effort is normal. No respiratory distress.      Comments: Symmetric chest rise.  Abdominal:      General: Abdomen is flat. There is no distension.      Palpations: Abdomen is soft.   Musculoskeletal:      Comments: Remote R BKA.  Stump in appropriate state.  Left  BKA stump wrapped in gauze and with knee immobilizer.   Skin:     General: Skin is warm and dry.   Neurological:      General: No focal deficit present.      Mental Status: He is alert and oriented to person, place, and time.       Vents:  Oxygen Concentration (%): 21 (10/07/22 1916)    Lines/Drains/Airways       Peripherally Inserted Central Catheter Line  Duration             PICC Double Lumen 09/18/22 1738 right basilic 19 days              Drain  Duration                  Hemodialysis AV Fistula 09/14/22 0717 Left upper arm 23 days         Urethral Catheter 10/06/22 2230 1 day                    Significant Labs:    CBC/Anemia Profile:  Recent Labs   Lab 10/06/22  1359 10/07/22  0501 10/08/22  0154   WBC 7.51 8.93 10.57   HGB 8.1* 7.4* 8.4*   HCT 27.4* 25.2* 27.5*    346 302   MCV 95 95 92   RDW 16.3* 16.4* 17.0*        Chemistries:  Recent Labs   Lab 10/06/22  1359 10/07/22  0501 10/08/22  0154 10/08/22  0343   * 130* 128*  --    K 4.9 4.8 4.5  --     106 104  --    CO2 14* 12* 14*  --    BUN 17 21 22  --    CREATININE 2.1* 2.8* 3.6*  --    CALCIUM 8.0* 8.2* 7.8*  --    ALBUMIN  --   --  2.0*  --    PROT  --   --  5.5*  --    BILITOT  --   --  0.3  --    ALKPHOS  --   --  102  --    ALT  --   --  <5*  --    AST  --   --  10  --    MG  --  1.8 1.7 1.8       A1C: No results for input(s): HGBA1C in the last 48 hours.  Blood Culture: No results for input(s): LABBLOO in the last 48 hours.  Coagulation: No results for input(s): PT, INR, APTT in the last 48 hours.  Lactic Acid:   Recent Labs   Lab 10/08/22  0343   LACTATE 1.7     Pathology Results  (Last 10 years)                 09/17/22 1033  Specimen to Pathology, Surgery Orthopedics Final result    Narrative:  Pre-op Diagnosis: Closed fracture of left tibia and fibula   with routine healing, subsequent encounter [S82.202D,   S82.402D]   Acute osteomyelitis of left calcaneus [M86.172]   Infection of deep incisional surgical site after procedure,    initial encounter [T81.42XA]   Procedure(s):   REMOVAL, EXTERNAL FIXATION DEVICE   INCISION AND DRAINAGE, LOWER EXTREMITY   Number of specimens: 2   Name of specimens: 1. Hardware removed left leg (perm). 2.   Bone biopsy   Which provider would you like to cc?->RIGO JEFFERY   Release to patient->Immediate   Specimen total (fresh, frozen, permanent):->2       07/29/20 0934  Specimen to Pathology, Dermatology Final result    Narrative:  Number of Specimens:->1   ------------------------->-------------------------   Spec 1 Procedure:->Biopsy   Spec 1 Clinical Impression:->SCC, BCC   Spec 1 Source:->left cheek       06/29/20 1537  Specimen to Pathology, Dermatology Final result    Narrative:  Number of Specimens:->2   ------------------------->-------------------------   Spec 1 Procedure:->Biopsy   Spec 1 Clinical Impression:->R/O SCC   Spec 1 Source:->right ear   ------------------------->-------------------------   Spec 2 Procedure:->Biopsy   Spec 2 Clinical Impression:->R/O SCC   Spec 2 Source:->right temple       04/22/13 0930  Tissue Specimen to Pathology Final result    04/22/13 0930  Tissue Specimen to Pathology Final result    02/27/13 0000  Tissue Specimen to Pathology Final result    01/18/13 0000  Tissue Specimen to Pathology Final result          POCT Glucose:   Recent Labs   Lab 10/07/22  1754 10/07/22  2040 10/07/22  2319   POCTGLUCOSE 229* 203* 256*     Respiratory Culture: No results for input(s): GSRESP, RESPIRATORYC in the last 48 hours.  Troponin: No results for input(s): TROPONINI in the last 48 hours.  Urine Culture: No results for input(s): LABURIN in the last 48 hours.  Urine Studies: No results for input(s): COLORU, APPEARANCEUA, PHUR, SPECGRAV, PROTEINUA, GLUCUA, KETONESU, BILIRUBINUA, OCCULTUA, NITRITE, UROBILINOGEN, LEUKOCYTESUR, RBCUA, WBCUA, BACTERIA, SQUAMEPITHEL, HYALINECASTS in the last 48 hours.    Invalid input(s): YOLIE    Significant Imaging:  I have reviewed all pertinent  imaging results/findings within the past 24 hours.    ABG  Recent Labs   Lab 10/08/22  0148   PH 7.326*   PO2 64*   PCO2 26.4*   HCO3 13.8*   BE -12     Assessment/Plan:     Pulmonary  Chronic obstructive pulmonary disease  Not in exacerbation  -nebulizer treaments prn  -supplemental oxygen as needed     Cardiac/Vascular  Hypotension  Afebrile and no leukocytosis post-op  Hgb 8.4    No obvious sign of bleeding.  Continue ICU hemodynamic monitoring  Goal MAP > 65  Midodrine  Wean norepi as able      Renal/   donor kidney transplant for DM 16  S/p kidney transplant   tacrolimus continued  Was also on mycophenylate earlier this hospitalization; discontinued  by renal due to infection.   -Continue to hold mycophenylate for now  Also creatinine up 3.6  Bicarb 14 - NAGMA  Started on Bicarb 1300 TID on 10/7/22  Renal on board    ID  * Acute osteomyelitis of left calcaneus  S/p BKA 10/6    Endocrine  Type 2 diabetes mellitus with hyperglycemia, with long-term current use of insulin  Insulin subcu    Other  MARIA INES (obstructive sleep apnea)  CPAP qhs       Critical Care Daily Checklist:    A: Awake: RASS Goal/Actual Goal: RASS Goal: 0-->alert and calm  Actual: Villavicencio Agitation Sedation Scale (RASS): Alert and calm   B: Spontaneous Breathing Trial Performed?     C: SAT & SBT Coordinated?  negative                      D: Delirium: CAM-ICU Overall CAM-ICU: Negative   E: Early Mobility Performed? Yes   F: Feeding Goal: Goals: 1. Pt will consume 75% PO intake by RD follow up 2. Pt will consume 50% supplements by RD follow up  Status: Nutrition Goal Status: new   Current Diet Order   Procedures    Diet diabetic [facility]; 1500 Calorie; Cardiac (Low Na/Chol)     Order Specific Question:   Indicate patient location for additional diet options:     Answer:   [facility]     Order Specific Question:   Total calories:     Answer:   1500 Calorie     Order Specific Question:   Additional Diet Options:      Answer:   Cardiac (Low Na/Chol)      AS: Analgesia/Sedation PRN   T: Thromboembolic Prophylaxis Heparin subcu   H: HOB > 300 Yes   U: Stress Ulcer Prophylaxis (if needed) pepcid   G: Glucose Control Subcu insulin   B: Bowel Function Stool Occurrence: 0   I: Indwelling Catheter (Lines & Woody) Necessity Woody, R PICC   D: De-escalation of Antimicrobials/Pharmacotherapies Off abx    Plan for the day/ETD Midodrine  Wean norepi      Code Status:  Family/Goals of Care: DNR       Critical Care Time: 40 minutes  Critical secondary to hypotension requiring pressor support      Critical care was time spent personally by me on the following activities: development of treatment plan with patient or surrogate and bedside caregivers, discussions with consultants, evaluation of patient's response to treatment, examination of patient, ordering and performing treatments and interventions, ordering and review of laboratory studies, ordering and review of radiographic studies, pulse oximetry, re-evaluation of patient's condition. This critical care time did not overlap with that of any other provider or involve time for any procedures.     Jose Arcos MD  Critical Care Medicine  [facility] [place])

## 2022-10-08 NOTE — PROGRESS NOTES
[facility] - Intensive Care [facility])  Vascular Surgery  Progress Note    Patient Name: Lavelle Ladd  MRN: [id number]  Admission Date: 9/13/2022  Primary Care Provider: Primary Doctor No    Subjective:     Interval History: remains confused/agitated    Post-Op Info:  Procedure(s) (LRB):  AMPUTATION, BELOW KNEE (Left)   2 Days Post-Op      Medications:  Continuous Infusions:   sodium chloride 0.9% 50 mL/hr at 10/08/22 0600    NORepinephrine bitartrate-D5W 0.03 mcg/kg/min (10/08/22 0600)     Scheduled Meds:   cholestyramine  1 packet Oral BID    dicyclomine  10 mg Oral QID (AC & HS)    epoetin dyana-epbx  50 Units/kg Subcutaneous Every Mon, Wed, Fri    famotidine  20 mg Oral Daily    gabapentin  100 mg Oral BID    heparin (porcine)  5,000 Units Subcutaneous Q12H    insulin detemir U-100  3 Units Subcutaneous Daily    Lactobacillus acidoph-L.bulgar  4 tablet Oral TID WM    linaCLOtide  145 mcg Oral Before breakfast    magnesium oxide  400 mg Oral BID    miconazole nitrate 2%   Topical (Top) BID    midodrine  10 mg Oral Q8H    ondansetron  4 mg Intravenous Q8H    sertraline  25 mg Oral Daily    sodium bicarbonate  1,300 mg Oral TID    tacrolimus  1 mg Oral BID     PRN Meds:sodium chloride, acetaminophen, albuterol-ipratropium, aluminum-magnesium hydroxide-simethicone, dextrose 10%, dextrose 10%, glucagon (human recombinant), glucose, glucose, HYDROmorphone, HYDROmorphone, influenza, insulin aspart U-100, melatonin, naloxone, ondansetron, prochlorperazine, simethicone, sodium chloride 0.9%     Objective:     Vital Signs (Most Recent):  Temp: 97.7 °F (36.5 °C) (10/08/22 0315)  Pulse: 81 (10/08/22 0737)  Resp: (!) 22 (10/08/22 0931)  BP: (!) 112/53 (10/08/22 0500)  SpO2: 96 % (10/08/22 0737)   Vital Signs (24h Range):  Temp:  [97.6 °F (36.4 °C)-98.6 °F (37 °C)] 97.7 °F (36.5 °C)  Pulse:  [] 81  Resp:  [12-44] 22  SpO2:  [73 %-99 %] 96 %  BP: ()/(39-82) 112/53         Physical Exam    Significant Labs:  CBC:    Recent Labs   Lab 10/08/22  0857   WBC 10.89   RBC 2.94*   HGB 8.3*   HCT 27.5*      MCV 94   MCH 28.2   MCHC 30.2*     CMP:   Recent Labs   Lab 10/08/22  0154 10/08/22  0857   * 180*   CALCIUM 7.8* 7.4*   ALBUMIN 2.0*  --    PROT 5.5*  --    * 129*   K 4.5 4.7   CO2 14* 13*    104   BUN 22 22   CREATININE 3.6* 3.5*   ALKPHOS 102  --    ALT <5*  --    AST 10  --    BILITOT 0.3  --      Coagulation: No results for input(s): LABPROT, INR, APTT in the last 48 hours.    Significant Diagnostics:  I have reviewed all pertinent imaging results/findings within the past 24 hours.    Assessment/Plan:     Active Diagnoses:    Diagnosis Date Noted POA    PRINCIPAL PROBLEM:  Acute osteomyelitis of left calcaneus [M86.172] 2022 Yes    Hypotension [I95.9] 10/06/2022 No    S/P BKA (below knee amputation), left [Z89.512] 10/06/2022 Not Applicable    Peripheral artery disease [I73.9] 2022 Yes    Infection of deep incisional surgical site after procedure [T81.42XA] 09/15/2022 Yes    MARIA INES (obstructive sleep apnea) [G47.33] 2020 Yes    Stage 3 chronic kidney disease [N18.30]  Yes    Long term current use of immunosuppressive drug [Z79.899] 2018 Not Applicable    Chronic obstructive pulmonary disease [J44.9] 2016 Yes    Coronary artery disease of native artery of native heart with stable angina pectoris [I25.118] 2016 Yes    Type 2 diabetes mellitus with hyperglycemia, with long-term current use of insulin [E11.65, Z79.4]  Not Applicable     donor kidney transplant for DM 16 [Z94.0]  Not Applicable     Chronic    Essential hypertension [I10] 2015 Yes      Problems Resolved During this Admission:    Diagnosis Date Noted Date Resolved POA    Hyponatremia [E87.1] 2022 Yes    H/O amputation [Z89.9] 2016 10/05/2022 Yes     POD#2 s/p left bka  Confused/agitated  H/h stable  No leukocytosis  Cont care    Donal Mcdonald MD  Vascular  Surgery  [facility] - Intensive Care ([facility])

## 2022-10-08 NOTE — ASSESSMENT & PLAN NOTE
S/p kidney transplant   tacrolimus continued  Was also on mycophenylate earlier this hospitalization; discontinued 9/21 by renal due to infection.   -Continue to hold mycophenylate for now  Also creatinine up 3.7  Bicarb continues decline at 12 - NAGMA despite oral Bicarb 1300 TID  Renal on board  No acute RRT indication but may trend toward that

## 2022-10-08 NOTE — PROGRESS NOTES
[facility] ([facility])  Nephrology  Progress Note    Patient Name: Lavelle Ladd  MRN: [id number]  Admission Date: 9/13/2022  Hospital Length of Stay: 24 days  Attending Provider: Donal Mcdonald MD   Primary Care Physician: Primary Doctor No  Principal Problem:Acute osteomyelitis of left calcaneus  Reason for Consult: DINORAH in Kidney transplant status   Primary Nephrologist: [facility], last seen by Dr. Estrada 8/2020      Subjective:     HPI:   66 yo male with kidney transplant status last seen by Nephrology during this hospital stay for DINORAH which improved rapidly. Since then pt underwent a L BKA yesterday with subsequent hypotension requiring vasopressor support and expected anemia receiving blood transfusion currently.   Baseline allograft creatinine is around 1.4mg/dL. He has sustained several DINORAH insults in the past two years. Post operative creatinine was 1.7 and has increased to 2.8mg/dL this morning. UOP is decreased.   He denies any complaints currently, his partner is at bedside who assisted in feeding him lunch.     10/8  - delirious today  - poor UOP      Review of patient's allergies indicates:  No Known Allergies  Current Facility-Administered Medications   Medication Frequency    0.9%  NaCl infusion (for blood administration) Q24H PRN    0.9%  NaCl infusion Continuous    acetaminophen tablet 650 mg Q4H PRN    albuterol-ipratropium 2.5 mg-0.5 mg/3 mL nebulizer solution 3 mL Q6H PRN    aluminum-magnesium hydroxide-simethicone 200-200-20 mg/5 mL suspension 30 mL QID PRN    cholestyramine 4 gram packet 4 g BID    dextrose 10% bolus 125 mL PRN    dextrose 10% bolus 250 mL PRN    dicyclomine capsule 10 mg QID (AC & HS)    epoetin dyana-epbx injection 4,880 Units Every Mon, Wed, Fri    famotidine tablet 20 mg Daily    gabapentin capsule 100 mg BID    glucagon (human recombinant) injection 1 mg PRN    glucose chewable tablet 16 g PRN    glucose chewable tablet 24 g PRN    heparin (porcine)  injection 5,000 Units Q12H    HYDROmorphone injection 1 mg Q4H PRN    HYDROmorphone tablet 2 mg Q3H PRN    influenza (QUADRIVALENT ADJUVANTED PF) vaccine 0.5 mL vaccine x 1 dose    insulin aspart U-100 pen 1-10 Units QID (AC + HS) PRN    insulin detemir U-100 pen 3 Units Daily    Lactobacillus acidoph-L.bulgar 1 million cell tablet 4 tablet TID WM    linaCLOtide capsule 145 mcg Before breakfast    magnesium oxide tablet 400 mg BID    melatonin tablet 6 mg Nightly PRN    miconazole nitrate 2% ointment BID    midodrine tablet 10 mg Q8H    naloxone 0.4 mg/mL injection 0.02 mg PRN    NORepinephrine 4 mg in dextrose 5% 250 mL infusion (premix) (titrating) Continuous    ondansetron disintegrating tablet 4 mg Q6H PRN    ondansetron injection 4 mg Q8H    prochlorperazine injection Soln 5 mg Q6H PRN    sertraline tablet 25 mg Daily    simethicone chewable tablet 160 mg TID PRN    sodium bicarbonate tablet 1,300 mg TID    sodium chloride 0.9% flush 10 mL Q8H PRN    tacrolimus capsule 1 mg BID           Review of Systems   Constitutional:  Negative for fever.   Cardiovascular:  Positive for leg swelling.   Genitourinary:  Positive for decreased urine volume.   Allergic/Immunologic: Positive for immunocompromised state (kidney transplant status).   Psychiatric/Behavioral:  Positive for agitation, confusion and decreased concentration.    Objective:     Vital Signs (Most Recent):  Temp: 98.9 °F (37.2 °C) (10/08/22 1110)  Pulse: 84 (10/08/22 1100)  Resp: (!) 29 (10/08/22 1100)  BP: 136/60 (10/08/22 1100)  SpO2: 98 % (10/08/22 1100)  O2 Device (Oxygen Therapy): room air (10/08/22 0737)   Vital Signs (24h Range):  Temp:  [97.6 °F (36.4 °C)-99.3 °F (37.4 °C)] 98.9 °F (37.2 °C)  Pulse:  [] 84  Resp:  [12-44] 29  SpO2:  [83 %-99 %] 98 %  BP: ()/(39-84) 136/60     Weight: 85.3 kg (188 lb 0.8 oz) (10/08/22 0627)  Body mass index is 26.23 kg/m².  Body surface area is 2.07 meters squared.    I/O last 3 completed shifts:  In:  1383.8 [I.V.:1060.9; Blood:322.9]  Out: 1040 [Urine:1040]    Physical Exam  Vitals and nursing note reviewed.   Constitutional:       Comments:      Cardiovascular:      Rate and Rhythm: Normal rate.      Heart sounds:     No friction rub.   Pulmonary:      Breath sounds: No wheezing or rales.   Abdominal:      Tenderness: There is no abdominal tenderness.   Neurological:      Mental Status: He is alert.       Significant Labs:  reviewed    Assessment/Plan:     Active Diagnoses:    Diagnosis Date Noted POA    PRINCIPAL PROBLEM:  Acute osteomyelitis of left calcaneus [M86.172] 2022 Yes    Hypotension [I95.9] 10/06/2022 No    S/P BKA (below knee amputation), left [Z89.512] 10/06/2022 Not Applicable    Peripheral artery disease [I73.9] 2022 Yes    Infection of deep incisional surgical site after procedure [T81.42XA] 09/15/2022 Yes    MARIA INES (obstructive sleep apnea) [G47.33] 2020 Yes    Stage 3 chronic kidney disease [N18.30]  Yes    Long term current use of immunosuppressive drug [Z79.899] 2018 Not Applicable    Chronic obstructive pulmonary disease [J44.9] 2016 Yes    Coronary artery disease of native artery of native heart with stable angina pectoris [I25.118] 2016 Yes    Type 2 diabetes mellitus with hyperglycemia, with long-term current use of insulin [E11.65, Z79.4]  Not Applicable     donor kidney transplant for DM 16 [Z94.0]  Not Applicable     Chronic    Essential hypertension [I10] 2015 Yes      Problems Resolved During this Admission:    Diagnosis Date Noted Date Resolved POA    Hyponatremia [E87.1] 2022 Yes    H/O amputation [Z89.9] 2016 10/05/2022 Yes       DINORAH on CKD kidney transplant status, baseline creatinine around 1.4mg/dL  - second DINORAH episode since admission  - continues with decreased UOP and worsening kidney function  - more likely ATN as improved hemodynamics did not improve kidney function  - no indications for dialysis  today but heading that way      Anemia  - transfuse prn    Kidney transplant status  - continue Prograf   - continue to hold MMF    Hyponatremia  - monitor, no intervention required       Gianni Masterson MD  Nephrology

## 2022-10-08 NOTE — ASSESSMENT & PLAN NOTE
Afebrile and no leukocytosis post-op  Hgb stable    No obvious sign of bleeding.  Continue ICU hemodynamic monitoring  Goal MAP > 65  Midodrine; add florinef; Wean norepi as able

## 2022-10-08 NOTE — ASSESSMENT & PLAN NOTE
Patient's FSGs are uncontrolled due to hyperglycemia on current medication regimen.  Last A1c reviewed-   Lab Results   Component Value Date    HGBA1C 7.4 (H) 08/04/2022     Most recent fingerstick glucose reviewed-   Recent Labs   Lab 10/07/22  2040 10/07/22  2319 10/08/22  0618 10/08/22  1157   POCTGLUCOSE 203* 256* 227* 190*     Current correctional scale  Low  Maintain anti-hyperglycemic dose as follows-   Antihyperglycemics (From admission, onward)    Start     Stop Route Frequency Ordered    10/08/22 0715  insulin detemir U-100 pen 3 Units         -- SubQ Daily 10/08/22 0701    10/07/22 1919  insulin aspart U-100 pen 1-10 Units         -- SubQ Before meals & nightly PRN 10/07/22 1819        Levemir added- changed to bid dosing and moderate sliding scale - dose increased from 16 units to 30 Units  Hold Oral hypoglycemics while patient is in the hospital.  9/24/22 The patient had a rapid response called over night. His blood glucose dropped to <20. He was given an amp of D50 and he returned to baseline.   10/08/2022   Stable

## 2022-10-08 NOTE — PROGRESS NOTES
Pt continues to have uncontrolled pain but refuses to talk about pain with provider. Urine output remained low through shift. Pt refused turns after continuous education for need to distribute pressure and risk for skin breakdown.
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
## 2022-10-08 NOTE — ASSESSMENT & PLAN NOTE
Creatinine improved  Awaiting angio- completed  Continue tacrolimus- weekly level      10/7:    Noted to have decreased urine output, creatinine trending up, nephrology notified, follow-up on recommendations.      10/8- worsening renal function with decreased UOP and Acidosis- heading towards HD

## 2022-10-08 NOTE — SUBJECTIVE & OBJECTIVE
Interval History: pt seen and examined in the ICU, POD 2 s/p L BKA, lethargic, mostly delirious but did c/o pain at the surgery site. ICU attempting to wean Levophed off by adding Midodrine. H/H 8.4/25. Persistent Metabolic Acidosis, HCO3 13, Nephrology following. Ativan and Norco d/adela, ICU trying Dilaudid due to DINORAH.     Review of Systems   Unable to perform ROS: Mental status change   Objective:     Vital Signs (Most Recent):  Temp: 98.9 °F (37.2 °C) (10/08/22 1110)  Pulse: 91 (10/08/22 1615)  Resp: (!) 23 (10/08/22 1615)  BP: (!) 147/64 (10/08/22 1615)  SpO2: 97 % (10/08/22 1615)   Vital Signs (24h Range):  Temp:  [97.6 °F (36.4 °C)-99.3 °F (37.4 °C)] 98.9 °F (37.2 °C)  Pulse:  [] 91  Resp:  [12-44] 23  SpO2:  [83 %-99 %] 97 %  BP: ()/(39-86) 147/64     Weight: 85.3 kg (188 lb 0.8 oz)  Body mass index is 26.23 kg/m².    Intake/Output Summary (Last 24 hours) at 10/8/2022 1733  Last data filed at 10/8/2022 1100  Gross per 24 hour   Intake 707.95 ml   Output 210 ml   Net 497.95 ml      Physical Exam  Vitals reviewed.   Constitutional:       Appearance: Normal appearance. He is ill-appearing.   HENT:      Head: Normocephalic and atraumatic.      Nose: Nose normal.   Eyes:      Extraocular Movements: Extraocular movements intact.      Pupils: Pupils are equal, round, and reactive to light.   Cardiovascular:      Rate and Rhythm: Normal rate and regular rhythm.   Pulmonary:      Effort: Pulmonary effort is normal. No respiratory distress.      Comments: Symmetric chest rise.  Abdominal:      General: Abdomen is flat. There is no distension.      Palpations: Abdomen is soft.   Musculoskeletal:      Comments: Remote R BKA.  Stump in appropriate state.  Left BKA stump wrapped in gauze and with knee immobilizer.   Skin:     General: Skin is warm and dry.   Neurological:      General: No focal deficit present.      Mental Status: He is alert and oriented to person, place, and time.     Flow (L/min): 2  Oxygen  Concentration (%):  [21] 21  Temp:  [97.6 °F (36.4 °C)-99.3 °F (37.4 °C)] 98.9 °F (37.2 °C)  Pulse:  [] 91  Resp:  [12-44] 23  SpO2:  [83 %-99 %] 97 %  BP: ()/(39-86) 147/64   Art pH/pCO2/pO2/HCO3:  7.326/26.4/64/13.8 (10/08 0148)  Lab Results   Component Value Date    [id number] Positive (A) 09/02/2022    [id number] Positive (A) 08/30/2022    [id number] Negative 08/11/2022      Recent Labs   Lab 10/07/22  0501 10/08/22  0154 10/08/22  0343 10/08/22  0857   LACTATE  --   --  1.7  --    * 128*  --  129*   WBC 8.93 10.57  --  10.89   GRAN 57.7  5.2 72.1  7.6  --  67.2  7.3   LYMPH 23.5  2.1 12.2*  1.3  --  14.4*  1.6   HGB 7.4* 8.4*  --  8.3*   HCT 25.2* 27.5*  --  27.5*   BUN 21 22  --  22   CREATININE 2.8* 3.6*  --  3.5*   K 4.8 4.5  --  4.7    104  --  104   CO2 12* 14*  --  13*   MG 1.8 1.7 1.8  --      Microbiology Results (last 7 days)       Procedure Component Value Units Date/Time    Blood culture [[id number]] Collected: 10/01/22 1228    Order Status: Completed Specimen: Blood Updated: 10/06/22 2012     Blood Culture, Routine No growth after 5 days.    Blood culture [[id number]] Collected: 10/01/22 1228    Order Status: Completed Specimen: Blood Updated: 10/06/22 2012     Blood Culture, Routine No growth after 5 days.          Antibiotics (From admission, onward)      None          Anticoagulants       Ordered     Route Frequency Start Stop    10/08/22 0911  heparin (porcine)         SubQ Every 12 hours 10/08/22 1015 --    09/18/22 1104  heparin (porcine)         SubQ Every 8 hours 09/18/22 1400 10/05 2359          X-Ray Chest 1 View  Narrative: EXAMINATION:  XR CHEST 1 VIEW    CLINICAL HISTORY:  fever;    TECHNIQUE:  Single frontal view of the chest was performed.    COMPARISON:  Chest radiograph 09/18/2022 with multiple priors    FINDINGS:  Cardiac leads project over the chest.  Stable positioning of a right PICC.  Cardiomediastinal silhouette is unchanged in size.  No new  pulmonary infiltrate or consolidation.  No large pleural effusion.  No pneumothorax.  The visualized osseous structures appear intact.  Impression: No acute radiographic abnormality in the chest.    Electronically signed by: Shelton Alcocer  Date:    10/01/2022  Time:    14:03   Significant Labs: All pertinent labs within the past 24 hours have been reviewed.    Significant Imaging: I have reviewed all pertinent imaging results/findings within the past 24 hours.

## 2022-10-09 PROBLEM — G93.41 ENCEPHALOPATHY, METABOLIC: Status: ACTIVE | Noted: 2022-01-01

## 2022-10-09 NOTE — PT/OT/SLP PROGRESS
Physical Therapy      Patient Name:  Lavelle Ladd   MRN:  [id number]    Patient not seen today secondary to increased confusion and agitation. Pt alert to self-only during attempt @0839. Pt unable to follow simple commands, and agree to therapy treatment on this date. Will follow-up for progressive mobility as scheduled.

## 2022-10-09 NOTE — PROGRESS NOTES
[facility] - Intensive Care ([facility])  Nephrology  Progress Note    Patient Name: Lavelle Ladd  MRN: [id number]  Admission Date: 9/13/2022  Hospital Length of Stay: 25 days  Attending Provider: Donal Mcdonald MD   Primary Care Physician: Primary Doctor No  Principal Problem:Acute osteomyelitis of left calcaneus  Reason for Consult: DINORAH in Kidney transplant status   Primary Nephrologist: [facility], last seen by Dr. Estrada 8/2020      Subjective:     HPI:   68 yo male with kidney transplant status last seen by Nephrology during this hospital stay for DINORAH which improved rapidly. Since then pt underwent a L BKA yesterday with subsequent hypotension requiring vasopressor support and expected anemia receiving blood transfusion currently.   Baseline allograft creatinine is around 1.4mg/dL. He has sustained several DINORAH insults in the past two years. Post operative creatinine was 1.7 and has increased to 2.8mg/dL this morning. UOP is decreased.   He denies any complaints currently, his partner is at bedside who assisted in feeding him lunch.     10/8  - delirious today  - poor UOP    10/9  - positive fluid balance, on NC today      Review of patient's allergies indicates:  No Known Allergies  Current Facility-Administered Medications   Medication Frequency    0.9%  NaCl infusion (for blood administration) Q24H PRN    0.9%  NaCl infusion Continuous    acetaminophen tablet 650 mg Q4H PRN    albuterol-ipratropium 2.5 mg-0.5 mg/3 mL nebulizer solution 3 mL Q6H PRN    aluminum-magnesium hydroxide-simethicone 200-200-20 mg/5 mL suspension 30 mL QID PRN    cholestyramine 4 gram packet 4 g BID    dextrose 10% bolus 125 mL PRN    dextrose 10% bolus 250 mL PRN    dicyclomine capsule 10 mg QID (AC & HS)    epoetin dyana-epbx injection 4,880 Units Every Mon, Wed, Fri    famotidine tablet 20 mg Daily    fludrocortisone tablet 100 mcg Daily    [START ON 10/10/2022] gabapentin capsule 100 mg Daily    glucagon (human recombinant)  injection 1 mg PRN    glucose chewable tablet 16 g PRN    glucose chewable tablet 24 g PRN    heparin (porcine) injection 5,000 Units Q12H    hydrocortisone sodium succinate injection 80 mg Q6H    HYDROmorphone injection 1 mg Q4H PRN    HYDROmorphone tablet 2 mg Q3H PRN    influenza (QUADRIVALENT ADJUVANTED PF) vaccine 0.5 mL vaccine x 1 dose    insulin aspart U-100 pen 1-10 Units QID (AC + HS) PRN    insulin detemir U-100 pen 5 Units BID    Lactobacillus acidoph-L.bulgar 1 million cell tablet 4 tablet TID WM    magnesium oxide tablet 400 mg Daily    melatonin tablet 6 mg Nightly PRN    miconazole nitrate 2% ointment BID    midodrine tablet 10 mg Q8H    naloxone 0.4 mg/mL injection 0.02 mg PRN    NORepinephrine 4 mg in dextrose 5% 250 mL infusion (premix) (titrating) Continuous    ondansetron disintegrating tablet 4 mg Q6H PRN    ondansetron injection 4 mg Q8H    prochlorperazine injection Soln 5 mg Q6H PRN    sertraline tablet 25 mg Daily    simethicone chewable tablet 160 mg TID PRN    sodium bicarbonate tablet 1,300 mg TID    sodium chloride 0.9% flush 10 mL Q8H PRN    tacrolimus capsule 1 mg BID           Review of Systems   Constitutional:  Negative for fever.   Genitourinary:  Positive for decreased urine volume.   Allergic/Immunologic: Positive for immunocompromised state.   Objective:     Vital Signs (Most Recent):  Temp: 99.1 °F (37.3 °C) (10/09/22 0300)  Pulse: 92 (10/09/22 0600)  Resp: (!) 25 (10/09/22 1239)  BP: (!) 90/46 (10/09/22 0600)  SpO2: (!) 92 % (10/09/22 0600)  O2 Device (Oxygen Therapy): nasal cannula (10/08/22 2015)   Vital Signs (24h Range):  Temp:  [98.8 °F (37.1 °C)-99.5 °F (37.5 °C)] 99.1 °F (37.3 °C)  Pulse:  [82-95] 92  Resp:  [13-38] 25  SpO2:  [89 %-98 %] 92 %  BP: ()/(39-67) 90/46     Weight: 85.3 kg (188 lb 0.8 oz) (10/08/22 0627)  Body mass index is 26.23 kg/m².  Body surface area is 2.07 meters squared.    I/O last 3 completed shifts:  In: 1952.5 [I.V.:1952.5]  Out: 463  [Urine:463]    Physical Exam  Vitals and nursing note reviewed.   Constitutional:       Comments:      Cardiovascular:      Rate and Rhythm: Normal rate.      Heart sounds:     No friction rub.   Pulmonary:      Effort: No respiratory distress.      Breath sounds: Wheezing present.   Abdominal:      Tenderness: There is no abdominal tenderness.   Neurological:      Mental Status: He is alert.       Significant Labs:  reviewed    Assessment/Plan:     Active Diagnoses:    Diagnosis Date Noted POA    PRINCIPAL PROBLEM:  Acute osteomyelitis of left calcaneus [M86.172] 2022 Yes    Encephalopathy, metabolic [G93.41] 10/09/2022 No    Hypotension [I95.9] 10/06/2022 No    S/P BKA (below knee amputation), left [Z89.512] 10/06/2022 Not Applicable    Peripheral artery disease [I73.9] 2022 Yes    Infection of deep incisional surgical site after procedure [T81.42XA] 09/15/2022 Yes    MARIA INES (obstructive sleep apnea) [G47.33] 2020 Yes    Old MI (myocardial infarction) [I25.2] 2019 Not Applicable    Stage 3 chronic kidney disease, DINORAH on CKD 3 [N18.30]  Yes    Long term current use of immunosuppressive drug [Z79.899] 2018 Not Applicable    Chronic obstructive pulmonary disease [J44.9] 2016 Yes    Coronary artery disease of native artery of native heart with stable angina pectoris [I25.118] 2016 Yes    Type 2 diabetes mellitus with hyperglycemia, with long-term current use of insulin [E11.65, Z79.4]  Not Applicable     donor kidney transplant for DM 16, DINORAH on CKD [Z94.0]  Not Applicable     Chronic    Essential hypertension [I10] 2015 Yes      Problems Resolved During this Admission:    Diagnosis Date Noted Date Resolved POA    Hyponatremia [E87.1] 2022 Yes    H/O amputation [Z89.9] 2016 10/05/2022 Yes       DINORAH on CKD kidney transplant status, baseline creatinine around 1.4mg/dL  - second DINORAH episode since admission  - UOP improved but still with  worsening kidney function, delta creatinine improved  - more likely ATN as improved hemodynamics did not improve kidney function  - no indications for dialysis today     Volume Status  - appears more overloaded today  - stop IVFs, check CXR   - dose Lasix     Metabolic acidosis  - very common post txp but worsening due to DINORAH  - continue po bicarb    Anemia  - transfuse prn    Kidney transplant status  - goal trough 4-7, last level of 9.9 not true trough  - continue current dose of Prograf   - continue to hold MMF     Delirium  - multifactorial  - gabapentin decreased again today (renally dose)      Gianni Masterson MD  Nephrology

## 2022-10-09 NOTE — PROGRESS NOTES
[facility] - Intensive Care [facility])  Vascular Surgery  Progress Note    Patient Name: Lavelle Ladd  MRN: [id number]  Admission Date: 9/13/2022  Primary Care Provider: Primary Doctor No    Subjective:     Interval History: remains confused    Post-Op Info:  Procedure(s) (LRB):  AMPUTATION, BELOW KNEE (Left)   3 Days Post-Op      Medications:  Continuous Infusions:   sodium chloride 0.9% 50 mL/hr at 10/09/22 0600    NORepinephrine bitartrate-D5W 0.04 mcg/kg/min (10/09/22 0600)     Scheduled Meds:   cholestyramine  1 packet Oral BID    dicyclomine  10 mg Oral QID (AC & HS)    epoetin dyana-epbx  50 Units/kg Subcutaneous Every Mon, Wed, Fri    famotidine  20 mg Oral Daily    fludrocortisone  100 mcg Oral Daily    gabapentin  100 mg Oral BID    heparin (porcine)  5,000 Units Subcutaneous Q12H    insulin detemir U-100  5 Units Subcutaneous BID    Lactobacillus acidoph-L.bulgar  4 tablet Oral TID WM    linaCLOtide  145 mcg Oral Before breakfast    magnesium oxide  400 mg Oral Daily    miconazole nitrate 2%   Topical (Top) BID    midodrine  10 mg Oral Q8H    ondansetron  4 mg Intravenous Q8H    sertraline  25 mg Oral Daily    sodium bicarbonate  1,300 mg Oral TID    tacrolimus  1 mg Oral BID     PRN Meds:sodium chloride, acetaminophen, albuterol-ipratropium, aluminum-magnesium hydroxide-simethicone, dextrose 10%, dextrose 10%, glucagon (human recombinant), glucose, glucose, HYDROmorphone, HYDROmorphone, influenza, insulin aspart U-100, melatonin, naloxone, ondansetron, prochlorperazine, simethicone, sodium chloride 0.9%     Objective:     Vital Signs (Most Recent):  Temp: 99.1 °F (37.3 °C) (10/09/22 0300)  Pulse: 92 (10/09/22 0600)  Resp: (!) 25 (10/09/22 0742)  BP: (!) 90/46 (10/09/22 0600)  SpO2: (!) 92 % (10/09/22 0600)   Vital Signs (24h Range):  Temp:  [98.8 °F (37.1 °C)-99.5 °F (37.5 °C)] 99.1 °F (37.3 °C)  Pulse:  [77-95] 92  Resp:  [13-41] 25  SpO2:  [88 %-98 %] 92 %  BP: ()/(39-86) 90/46         Physical  Exam    Significant Labs:  CBC:   Recent Labs   Lab 10/09/22  0411   WBC 13.05*   RBC 3.33*   HGB 9.3*   HCT 30.6*      MCV 92   MCH 27.9   MCHC 30.4*     CMP:   Recent Labs   Lab 10/09/22  0656   *   CALCIUM 7.8*   ALBUMIN 1.9*   PROT 5.0*   *   K 4.9   CO2 12*      BUN 23   CREATININE 3.7*   ALKPHOS 100   ALT <5*   AST 6*   BILITOT 0.3     Coagulation: No results for input(s): LABPROT, INR, APTT in the last 48 hours.    Significant Diagnostics:  I have reviewed all pertinent imaging results/findings within the past 24 hours.    Assessment/Plan:     Active Diagnoses:    Diagnosis Date Noted POA    PRINCIPAL PROBLEM:  Acute osteomyelitis of left calcaneus [M86.172] 2022 Yes    Hypotension [I95.9] 10/06/2022 No    S/P BKA (below knee amputation), left [Z89.512] 10/06/2022 Not Applicable    Peripheral artery disease [I73.9] 2022 Yes    Infection of deep incisional surgical site after procedure [T81.42XA] 09/15/2022 Yes    MARIA INES (obstructive sleep apnea) [G47.33] 2020 Yes    Stage 3 chronic kidney disease [N18.30]  Yes    Long term current use of immunosuppressive drug [Z79.899] 2018 Not Applicable    Chronic obstructive pulmonary disease [J44.9] 2016 Yes    Coronary artery disease of native artery of native heart with stable angina pectoris [I25.118] 2016 Yes    Type 2 diabetes mellitus with hyperglycemia, with long-term current use of insulin [E11.65, Z79.4]  Not Applicable     donor kidney transplant for DM 16 [Z94.0]  Not Applicable     Chronic    Essential hypertension [I10] 2015 Yes      Problems Resolved During this Admission:    Diagnosis Date Noted Date Resolved POA    Hyponatremia [E87.1] 2022 Yes    H/O amputation [Z89.9] 2016 10/05/2022 Yes     S/p left BKA POD#3  Dressings removed  Stump clean/dry  Cont care  Will sign off    Donal Mcdonald MD  Vascular Surgery  [facility] - Intensive Care ([facility])

## 2022-10-09 NOTE — ASSESSMENT & PLAN NOTE
Stable  Monitor, avoid nephrotoxic meds  Nephrology signed off    DINORAH on CKD3 on a transplanted kidney- continue gentle hydration, Levophed gtt, Florinef and Midodrine  Start Solucortef

## 2022-10-09 NOTE — SUBJECTIVE & OBJECTIVE
Interval History: Appreciate ICU and Nephrology- looks better, confused but improving, getting gentle hydration, Cr only 3.7, Prograf at 9.9- Dr. Masterson wants to continue it. BP a little better, hence on less Levophed today, continuing Midodrine and Florinef. Urine output low as well but it appears that he is slowly making progress. WBC 13.05, H/H 9.3/31, Na 132, HCO3 12, Cr 3.7.     Review of Systems   Unable to perform ROS: Mental status change   Objective:     Vital Signs (Most Recent):  Temp: 99.1 °F (37.3 °C) (10/09/22 0300)  Pulse: 92 (10/09/22 0600)  Resp: (!) 25 (10/09/22 0742)  BP: (!) 90/46 (10/09/22 0600)  SpO2: (!) 92 % (10/09/22 0600)   Vital Signs (24h Range):  Temp:  [98.8 °F (37.1 °C)-99.5 °F (37.5 °C)] 99.1 °F (37.3 °C)  Pulse:  [77-95] 92  Resp:  [13-41] 25  SpO2:  [88 %-98 %] 92 %  BP: ()/(39-67) 90/46     Weight: 85.3 kg (188 lb 0.8 oz)  Body mass index is 26.23 kg/m².    Intake/Output Summary (Last 24 hours) at 10/9/2022 1225  Last data filed at 10/9/2022 0600  Gross per 24 hour   Intake 1245.71 ml   Output 263 ml   Net 982.71 ml      Physical Exam  Vitals and nursing note reviewed.   Constitutional:       Appearance: Normal appearance. He is ill-appearing.   HENT:      Head: Normocephalic and atraumatic.      Nose: Nose normal.   Eyes:      Extraocular Movements: Extraocular movements intact.      Pupils: Pupils are equal, round, and reactive to light.   Cardiovascular:      Rate and Rhythm: Normal rate and regular rhythm.   Pulmonary:      Effort: Pulmonary effort is normal. No respiratory distress.      Comments: Symmetric chest rise.  Abdominal:      General: Abdomen is flat. There is no distension.      Palpations: Abdomen is soft.   Musculoskeletal:      Comments: Remote R BKA.  Stump in appropriate state.  Left BKA stump wrapped in gauze and with knee immobilizer.   Skin:     General: Skin is warm and dry.   Neurological:      General: No focal deficit present.      Mental Status:  He is alert and oriented to person, place, and time.     Flow (L/min): 2  Oxygen Concentration (%):  [28] 28  Temp:  [98.8 °F (37.1 °C)-99.5 °F (37.5 °C)] 99.1 °F (37.3 °C)  Pulse:  [77-95] 92  Resp:  [13-41] 25  SpO2:  [88 %-98 %] 92 %  BP: ()/(39-67) 90/46      Lab Results   Component Value Date    [id number] Positive (A) 09/02/2022    [id number] Positive (A) 08/30/2022    ILC93FEYHLHL Negative 08/11/2022      Recent Labs   Lab 10/08/22  0154 10/08/22  0343 10/08/22  0857 10/09/22  0411 10/09/22  0656   LACTATE  --  1.7  --   --   --    *  --  129*  --  132*   WBC 10.57  --  10.89 13.05*  --    GRAN 72.1  7.6  --  67.2  7.3 70.7  9.2*  --    LYMPH 12.2*  1.3  --  14.4*  1.6 13.3*  1.7  --    HGB 8.4*  --  8.3* 9.3*  --    HCT 27.5*  --  27.5* 30.6*  --    BUN 22  --  22  --  23   CREATININE 3.6*  --  3.5*  --  3.7*   K 4.5  --  4.7  --  4.9     --  104  --  105   CO2 14*  --  13*  --  12*   MG 1.7 1.8  --  2.1  --      Microbiology Results (last 7 days)       Procedure Component Value Units Date/Time    Blood culture [[id number]] Collected: 10/01/22 1228    Order Status: Completed Specimen: Blood Updated: 10/06/22 2012     Blood Culture, Routine No growth after 5 days.    Blood culture [[id number]] Collected: 10/01/22 1228    Order Status: Completed Specimen: Blood Updated: 10/06/22 2012     Blood Culture, Routine No growth after 5 days.          Antibiotics (From admission, onward)      None          Anticoagulants       Ordered     Route Frequency Start Stop    10/08/22 0911  heparin (porcine)         SubQ Every 12 hours 10/08/22 1015 --    09/18/22 1104  heparin (porcine)         SubQ Every 8 hours 09/18/22 1400 10/05 2359          X-Ray Chest 1 View  Narrative: EXAMINATION:  XR CHEST 1 VIEW    CLINICAL HISTORY:  fever;    TECHNIQUE:  Single frontal view of the chest was performed.    COMPARISON:  Chest radiograph 09/18/2022 with multiple priors    FINDINGS:  Cardiac leads project over  the chest.  Stable positioning of a right PICC.  Cardiomediastinal silhouette is unchanged in size.  No new pulmonary infiltrate or consolidation.  No large pleural effusion.  No pneumothorax.  The visualized osseous structures appear intact.  Impression: No acute radiographic abnormality in the chest.    Electronically signed by: Shelton Alcocer  Date:    10/01/2022  Time:    14:03   Significant Labs: All pertinent labs within the past 24 hours have been reviewed.    Significant Imaging: I have reviewed all pertinent imaging results/findings within the past 24 hours.

## 2022-10-09 NOTE — ASSESSMENT & PLAN NOTE
Confused, disoriented since BKA and also hypotensive, acidotic, worsening renal failure, so transferred to ICU and placed on Levophed gtt  No significant improvement so far  Started on Steroids

## 2022-10-09 NOTE — PROGRESS NOTES
[facility] ([facility])  Critical Care Medicine  Progress Note    Patient Name: Lavelle Ladd  MRN: [id number]  Admission Date: 9/13/2022  Hospital Length of Stay: 25 days  Code Status: DNR  Attending Provider: Donal Mcdonald MD  Primary Care Provider: Primary Doctor No   Principal Problem: Acute osteomyelitis of left calcaneus    Subjective:     HPI:  66 yo male with HTN, CAD, DM, PVD, kidney transplant 2016-now with CKD3, COPD, Right BKA, Left transmetatarsal amputation, and recent Closed Fracture pf left tibia/fibula s/p closed reduction left tibial and fibular shaft fractures with application of external fixation device on 8/1/22 who was admitted with Left ankle wound infection at ext-fix pin site with calcaneus osteomyelitis on IV Vanc and Cefepime    9/17:   OR and Ex-fix hardware removed.  Antibiotic beads and wound vac placed.  Fluoro revealed unhealed fractures.  Was likely to need amputation.    Over next several days, was attempted to be tuned up from renal perspective.      9/21/22 - Arterial studies to RLE noted with hemodynamically significant stenosis suspected within the proximal superficial femoral artery. Vascular consulted for possible angiography.    9/23: Pt refused angiogram.    9/29: pt ultimately underwent angiogram and no targets for revascularization by endovascular or bypass identified.    Ortho ultimately recommended BKA.    10/6:  Pt underwent LLE BKA today.  While in PACU was hypotensive and now transferred to ICU.  BP now improved.  PT awake and interactive.    On arrival in ICU, pt is somewhat irriatble and not the best historian.  He states earlier he had abdominal discomfort with some nausea however that has since improved.  He specifically denies any fevers, sweats, chills.      Hospital/ICU Course:  10/6:  Hypotensive with SBP in 60s in PACU.  Now transferred to ICU.  10/7:  remains on low dose levophed for BP support. H/H down some and received 1un RBC. Complains of  uncontrolled pain but will not discuss further on attempt to determine if dose/med/or frequency change would be most helpful  10/8:  Midodrine started.  Attempt to wean norepi off.  Hgb 8.4 today.  Ongoing metabolic acidosis of unclear etiology.  AMS last night consistent with delirium.  DC Ativan PRN.  Change Norco PO PRN to Dilaudid PO PRN in setting of his renal dysfunction.  Missed morning midodrine dose due to his delirium and not suitable for PO at that time.  10/9 - remains on pressor though lower dose now on q8h midodrine. Hgb stable. Continued worsening creatinine and acidosis, stable electrolytes. Remains confused with improved pain control            Review of patient's allergies indicates:  No Known Allergies    Review of Systems   Reason unable to perform ROS: limited, confusion.   Constitutional:  Positive for fatigue.   Respiratory:  Negative for shortness of breath.    Cardiovascular:  Negative for chest pain.   Psychiatric/Behavioral:  The patient is nervous/anxious.    Objective:     Vital Signs (Most Recent):  Temp: 99.1 °F (37.3 °C) (10/09/22 0300)  Pulse: 92 (10/09/22 0600)  Resp: (!) 21 (10/09/22 0600)  BP: (!) 90/46 (10/09/22 0600)  SpO2: (!) 92 % (10/09/22 0600)   Vital Signs (24h Range):  Temp:  [98.8 °F (37.1 °C)-99.5 °F (37.5 °C)] 99.1 °F (37.3 °C)  Pulse:  [77-95] 92  Resp:  [13-41] 21  SpO2:  [88 %-98 %] 92 %  BP: ()/(39-86) 90/46     Weight: 85.3 kg (188 lb 0.8 oz)  Body mass index is 26.23 kg/m².      Intake/Output Summary (Last 24 hours) at 10/9/2022 0641  Last data filed at 10/9/2022 0600  Gross per 24 hour   Intake 1608.41 ml   Output 318 ml   Net 1290.41 ml        sodium chloride 0.9% 50 mL/hr at 10/09/22 0600    NORepinephrine bitartrate-D5W 0.04 mcg/kg/min (10/09/22 0600)         Physical Exam  Vitals and nursing note reviewed.   Constitutional:       General: He is awake.      Appearance: He is overweight. He is ill-appearing. He is not toxic-appearing.   HENT:       Head: Atraumatic.   Eyes:      Conjunctiva/sclera: Conjunctivae normal.   Neck:      Vascular: No JVD.   Cardiovascular:      Rate and Rhythm: Normal rate and regular rhythm.      Pulses:           Radial pulses are 2+ on the right side and 2+ on the left side.   Pulmonary:      Effort: Pulmonary effort is normal.      Breath sounds: Decreased breath sounds and rhonchi present. No wheezing.   Abdominal:      General: There is no distension.      Palpations: Abdomen is soft.   Musculoskeletal:      Right lower leg: No edema.      Left lower leg: No edema.   Skin:     General: Skin is warm and dry.      Capillary Refill: Capillary refill takes 2 to 3 seconds.          Neurological:      Mental Status: He is alert.      GCS: GCS eye subscore is 4. GCS verbal subscore is 4. GCS motor subscore is 6.   Psychiatric:         Attention and Perception: He is inattentive.         Mood and Affect: Affect is flat.         Speech: Speech normal.         Judgment: Judgment is impulsive.       Vents:  Oxygen Concentration (%): 28 (10/08/22 2015)    Lines/Drains/Airways       Peripherally Inserted Central Catheter Line  Duration             PICC Double Lumen 09/18/22 1738 right basilic 20 days              Drain  Duration                  Hemodialysis AV Fistula 09/14/22 0717 Left upper arm 24 days         Urethral Catheter 10/06/22 2230 2 days                    Significant Labs:    CBC/Anemia Profile:  Recent Labs   Lab 10/08/22  0154 10/08/22  0857 10/09/22  0411   WBC 10.57 10.89 13.05*   HGB 8.4* 8.3* 9.3*   HCT 27.5* 27.5* 30.6*    293 342   MCV 92 94 92   RDW 17.0* 17.2* 17.2*          Chemistries:   Recent Labs   Lab 10/08/22  0154 10/08/22  0343 10/08/22  0857 10/09/22  0411 10/09/22  0656   *  --  129*  --  132*   K 4.5  --  4.7  --  4.9     --  104  --  105   CO2 14*  --  13*  --  12*   BUN 22  --  22  --  23   CREATININE 3.6*  --  3.5*  --  3.7*   CALCIUM 7.8*  --  7.4*  --  7.8*   ALBUMIN 2.0*  --    --   --  1.9*   PROT 5.5*  --   --   --  5.0*   BILITOT 0.3  --   --   --  0.3   ALKPHOS 102  --   --   --  100   ALT <5*  --   --   --  <5*   AST 10  --   --   --  6*   MG 1.7 1.8  --  2.1  --          Significant Imaging:   I have reviewed all pertinent imaging results/findings within the past 24 hours.  Review of Systems   Reason unable to perform ROS: limited, confusion.   Constitutional:  Positive for fatigue.   Respiratory:  Negative for shortness of breath.    Cardiovascular:  Negative for chest pain.   Psychiatric/Behavioral:  The patient is nervous/anxious.        ABG  Recent Labs   Lab 10/08/22  0148   PH 7.326*   PO2 64*   PCO2 26.4*   HCO3 13.8*   BE -12     Assessment/Plan:     Pulmonary  Chronic obstructive pulmonary disease  Not in exacerbation  -nebulizer treaments prn  -supplemental oxygen as needed     Cardiac/Vascular  Hypotension  Afebrile and no leukocytosis post-op  Hgb stable    No obvious sign of bleeding.  Continue ICU hemodynamic monitoring  Goal MAP > 65  Midodrine; add florinef; Wean norepi as able    Renal/   donor kidney transplant for DM 16  S/p kidney transplant   tacrolimus continued  Was also on mycophenylate earlier this hospitalization; discontinued  by renal due to infection.   -Continue to hold mycophenylate for now  Also creatinine up 3.7  Bicarb continues decline at 12 - NAGMA despite oral Bicarb 1300 TID  Renal on board  No acute RRT indication but may trend toward that    ID  * Acute osteomyelitis of left calcaneus  S/p BKA 10/6    Endocrine  Type 2 diabetes mellitus with hyperglycemia, with long-term current use of insulin  Increase levemir dose and frequency  Continue SSI with monitoring for glucose control and prevention of insulin toxicity    Other  MARIA INES (obstructive sleep apnea)  Ordered nocturnal BiPap to match home settings     Critical Care Daily Checklist:    A: Awake: RASS Goal/Actual Goal: RASS Goal: 0-->alert and calm  Actual: Villavicencio Agitation  Sedation Scale (RASS): Restless   B: Spontaneous Breathing Trial Performed?     C: SAT & SBT Coordinated?  n/a                      D: Delirium: CAM-ICU Overall CAM-ICU: Positive   E: Early Mobility Performed? Yes   F: Feeding Goal: Goals: 1. Pt will consume 75% PO intake by RD follow up 2. Pt will consume 50% supplements by RD follow up  Status: Nutrition Goal Status: new   Current Diet Order   Procedures    Diet diabetic [facility]; 1500 Calorie; Cardiac (Low Na/Chol)     Order Specific Question:   Indicate patient location for additional diet options:     Answer:   [facility]     Order Specific Question:   Total calories:     Answer:   1500 Calorie     Order Specific Question:   Additional Diet Options:     Answer:   Cardiac (Low Na/Chol)      AS: Analgesia/Sedation prn   T: Thromboembolic Prophylaxis heparin   H: HOB > 300 Yes   U: Stress Ulcer Prophylaxis (if needed) pepcid   G: Glucose Control SSI prn   B: Bowel Function Stool Occurrence: 1   I: Indwelling Catheter (Lines & Woody) Necessity reviewed   D: De-escalation of Antimicrobials/Pharmacotherapies reviewed    Plan for the day/ETD  support BP    Code Status:  Family/Goals of Care: DNR  No family present on rounds today    I have discussed case and plan of care in detail with Dr Arcos; Status and plan of care were discussed with team on multidisciplinary rounds.    Critical Care Time: 50 minutes  Critical secondary to hypotension requiring pressor support and transfusion   Critical care was time spent personally by me on the following activities: development of treatment plan with patient or surrogate and bedside caregivers, discussions with consultants, evaluation of patient's response to treatment, examination of patient, ordering and performing treatments and interventions, ordering and review of laboratory studies, ordering and review of radiographic studies, pulse oximetry, re-evaluation of patient's condition. This critical care time  did not overlap with that of any other provider or involve time for any procedures.     FIDEL Miller-BC  Critical Care Medicine  [facility] ([facility])

## 2022-10-09 NOTE — PROGRESS NOTES
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 apply): Patient unstable, Patient new problem, Patient trending condition, Laboratory test, Treatment, Imaging and Consult recommendations  Discharge Plan A: Skilled Nursing Facility   Discharge Delays: (!) Patient and Family Barriers    Seen and discussed with Josselyn Ramirez and the ICU team  Condition: Critical  Prognosis: Guarded to poor      Critical care time spent on the evaluation and treatment of severe organ dysfunction, review of pertinent labs and imaging studies, discussions with consulting providers and discussions with patient/family: 45 minutes.      Faraz Ng MD  Department of Hospital Medicine   '[place] - Intensive Care ([facility])

## 2022-10-09 NOTE — ASSESSMENT & PLAN NOTE
Hypotensive following surgery, treated with IV fluid bolus with no improvement, placed on Levophed drip, transferred to ICU    --Vitals per Unit protocol  --Continue Levophed at this time  --Hold HTN medications  --Consult Critical Care    10/7:    Status post BKA as of 10/06/2022- became hypotensive and has been transferred to ICU on Levophed.  Currently on Levophed.    Hemoglobin dropped down to 7.7, 1 unit of PRBC ordered.  Follow up on repeat labs.    After transfusion, IVF--> if BP stabilizes possible downgrade to floor;     10/8- still hypotensive on Levophed, was on Florinef, added Midodrine    10/9- BP slightly better, continue present care,    Starting Solucortef

## 2022-10-09 NOTE — PLAN OF CARE
Pt remains with altered mental status but becoming more lucid throughout shift. Remains on levo gtt for MAP >65. Education and safety reviewed

## 2022-10-09 NOTE — ASSESSMENT & PLAN NOTE
Creatinine improved  Awaiting angio- completed  Continue tacrolimus- weekly level      10/7:    Noted to have decreased urine output, creatinine trending up, nephrology notified, follow-up on recommendations.      10/8- worsening renal function with decreased UOP and Acidosis- heading towards HD    10/9- persists, BP marginally better, continue to watch, Renal following  Start Solucortef

## 2022-10-10 PROBLEM — T81.42XA INFECTION OF DEEP INCISIONAL SURGICAL SITE AFTER PROCEDURE: Status: RESOLVED | Noted: 2022-01-01 | Resolved: 2022-01-01

## 2022-10-10 PROBLEM — Z51.5 PALLIATIVE CARE ENCOUNTER: Status: ACTIVE | Noted: 2022-01-01

## 2022-10-10 NOTE — PLAN OF CARE
Pt remains free from falls/injuries this shift. Safety precautions maintained. Pain managed with PRN meds. Pt oriented and answers questions appropriately but has moments of confusion pt is easily reoriented Dressing to LLE CDI. No s/s of acute distress noted. Will continue to monitor. Chart check completed.

## 2022-10-10 NOTE — ASSESSMENT & PLAN NOTE
S/p kidney transplant   -medications - mycophenolate continued    Prograf 9.9- continue    Good response to stress dose steroids  Solucortef lowered to 40 qid

## 2022-10-10 NOTE — ASSESSMENT & PLAN NOTE
Afebrile and no leukocytosis post-op  Hgb stable    No obvious sign of bleeding.  Continue Midodrine; florinef; will begin weaning stress dose steroid  Off levophed > 12 hours

## 2022-10-10 NOTE — SUBJECTIVE & OBJECTIVE
Interval History: looks and feels much better this am, good response to Solucortef, was able to come off levophed gtt. AAOx3, more lucid and able to have a conversation, does not want HD, met with Palliative Med as well. Concerned about phantom pain LLE. I also had a detailed d/w with his sister, GLORIA. Bun/Cr 29/4.0, HCO3 14, H/H 8.7/29. Ok to transfer out of ICU.      Review of Systems   Unable to perform ROS: Mental status change   Objective:     Vital Signs (Most Recent):  Temp: 96.7 °F (35.9 °C) (10/10/22 1644)  Pulse: 67 (10/10/22 1644)  Resp: 16 (10/10/22 1720)  BP: (!) 146/65 (10/10/22 1644)  SpO2: 96 % (10/10/22 1644)   Vital Signs (24h Range):  Temp:  [96.7 °F (35.9 °C)-98.4 °F (36.9 °C)] 96.7 °F (35.9 °C)  Pulse:  [58-74] 67  Resp:  [5-35] 16  SpO2:  [94 %-100 %] 96 %  BP: (111-160)/(51-67) 146/65     Weight: 85.3 kg (188 lb 0.8 oz)  Body mass index is 26.23 kg/m².    Intake/Output Summary (Last 24 hours) at 10/10/2022 1853  Last data filed at 10/10/2022 1007  Gross per 24 hour   Intake 290 ml   Output 310 ml   Net -20 ml      Physical Exam  Vitals and nursing note reviewed.   Constitutional:       General: He is awake.      Appearance: He is overweight. He is ill-appearing. He is not toxic-appearing.   HENT:      Head: Atraumatic.   Eyes:      Conjunctiva/sclera: Conjunctivae normal.   Neck:      Vascular: No JVD.   Cardiovascular:      Rate and Rhythm: Normal rate and regular rhythm.      Pulses:           Radial pulses are 2+ on the right side and 2+ on the left side.   Pulmonary:      Effort: Pulmonary effort is normal.      Breath sounds: Decreased breath sounds and rhonchi present. No wheezing.   Abdominal:      General: There is no distension.      Palpations: Abdomen is soft.   Musculoskeletal:      Right lower leg: No edema.      Left lower leg: No edema.   Skin:     General: Skin is warm and dry.      Capillary Refill: Capillary refill takes 2 to 3 seconds.          Neurological:      Mental  Status: He is alert.      GCS: GCS eye subscore is 4. GCS verbal subscore is 5. GCS motor subscore is 6.   Psychiatric:         Attention and Perception: He is attentive.         Mood and Affect: Affect is blunt.         Speech: Speech normal.         Behavior: Behavior is not agitated or aggressive. Behavior is cooperative.         Judgment: Judgment is impulsive.       Significant Labs: All pertinent labs within the past 24 hours have been reviewed.  Blood Culture: No results for input(s): LABBLOO in the last 48 hours.  BMP:   Recent Labs   Lab 10/10/22  0430   *   *   K 4.6      CO2 14*   BUN 29*   CREATININE 4.0*   CALCIUM 8.3*   MG 2.3     CBC:   Recent Labs   Lab 10/09/22  0411 10/10/22  0430   WBC 13.05* 7.04   HGB 9.3* 8.7*   HCT 30.6* 29.1*    295     CMP:   Recent Labs   Lab 10/09/22  0656 10/10/22  0430   * 133*   K 4.9 4.6    107   CO2 12* 14*   * 299*   BUN 23 29*   CREATININE 3.7* 4.0*   CALCIUM 7.8* 8.3*   PROT 5.0*  --    ALBUMIN 1.9*  --    BILITOT 0.3  --    ALKPHOS 100  --    AST 6*  --    ALT <5*  --    ANIONGAP 15 12     Coagulation: No results for input(s): PT, INR, APTT in the last 48 hours.    Significant Imaging: I have reviewed all pertinent imaging results/findings within the past 24 hours.

## 2022-10-10 NOTE — ASSESSMENT & PLAN NOTE
Hypotensive following surgery, treated with IV fluid bolus with no improvement, placed on Levophed drip, transferred to ICU    --Vitals per Unit protocol  --Continue Levophed at this time  --Hold HTN medications  --Consult Critical Care    10/7:    Status post BKA as of 10/06/2022- became hypotensive and has been transferred to ICU on Levophed.  Currently on Levophed.    Hemoglobin dropped down to 7.7, 1 unit of PRBC ordered.  Follow up on repeat labs.    After transfusion, IVF--> if BP stabilizes possible downgrade to floor;     10/8- still hypotensive on Levophed, was on Florinef, added Midodrine    10/9- BP slightly better, continue present care,    Starting Solucortef    10/10- improved, off Levophed with steroids

## 2022-10-10 NOTE — PLAN OF CARE
Patient tolerated intervention poorly. Poor response to all therapist encouragement and poor participation. Recommending SNF vs basic nursing home placement pending future participation.

## 2022-10-10 NOTE — NURSING
Patient transported to room#526 per protocol without incident.    Nurse Severino at bedside to receive patient.    Patient's sister Kecia notified via telephone of Mr. Archer's transfer out of the ICU.

## 2022-10-10 NOTE — PROGRESS NOTES
[facility] - Intensive Care ([facility])  Critical Care Medicine  Progress Note    Patient Name: Lavelle Ladd  MRN: [id number]  Admission Date: 9/13/2022  Hospital Length of Stay: 26 days  Code Status: DNR  Attending Provider: Donal Mcdonald MD  Primary Care Provider: Primary Doctor No   Principal Problem: Acute osteomyelitis of left calcaneus    Subjective:     HPI:  66 yo male with HTN, CAD, DM, PVD, kidney transplant 2016-now with CKD3, COPD, Right BKA, Left transmetatarsal amputation, and recent Closed Fracture pf left tibia/fibula s/p closed reduction left tibial and fibular shaft fractures with application of external fixation device on 8/1/22 who was admitted with Left ankle wound infection at ext-fix pin site with calcaneus osteomyelitis on IV Vanc and Cefepime    9/17:   OR and Ex-fix hardware removed.  Antibiotic beads and wound vac placed.  Fluoro revealed unhealed fractures.  Was likely to need amputation.    Over next several days, was attempted to be tuned up from renal perspective.      9/21/22 - Arterial studies to RLE noted with hemodynamically significant stenosis suspected within the proximal superficial femoral artery. Vascular consulted for possible angiography.    9/23: Pt refused angiogram.    9/29: pt ultimately underwent angiogram and no targets for revascularization by endovascular or bypass identified.    Ortho ultimately recommended BKA.    10/6:  Pt underwent LLE BKA today.  While in PACU was hypotensive and now transferred to ICU.  BP now improved.  PT awake and interactive.    On arrival in ICU, pt is somewhat irriatble and not the best historian.  He states earlier he had abdominal discomfort with some nausea however that has since improved.  He specifically denies any fevers, sweats, chills.      Hospital/ICU Course:  10/6:  Hypotensive with SBP in 60s in PACU.  Now transferred to ICU.  10/7:  remains on low dose levophed for BP support. H/H down some and received 1un RBC. Complains of  uncontrolled pain but will not discuss further on attempt to determine if dose/med/or frequency change would be most helpful  10/8:  Midodrine started.  Attempt to wean norepi off.  Hgb 8.4 today.  Ongoing metabolic acidosis of unclear etiology.  AMS last night consistent with delirium.  DC Ativan PRN.  Change Norco PO PRN to Dilaudid PO PRN in setting of his renal dysfunction.  Missed morning midodrine dose due to his delirium and not suitable for PO at that time.  10/9 - remains on pressor though lower dose now on q8h midodrine. Hgb stable. Continued worsening creatinine and acidosis, stable electrolytes. Remains confused with improved pain control  10/10 more lucid than previous exams. Reports overnight awareness of hallucinations. BP stable off levophed >12 hours after addition of florinef and stress dose IV steroid. Urine output remains ~10/hour with continued worsening renal function            Review of patient's allergies indicates:  No Known Allergies    Review of Systems   Constitutional:  Positive for malaise/fatigue.   HENT:  Negative for sore throat.    Respiratory:  Negative for shortness of breath.    Cardiovascular:  Negative for chest pain.   Gastrointestinal:  Positive for diarrhea. Negative for abdominal pain, nausea and vomiting.   Musculoskeletal:  Positive for myalgias.   Neurological:  Negative for focal weakness and headaches.   Psychiatric/Behavioral:  Positive for hallucinations. The patient is nervous/anxious.      Objective:     Vital Signs (Most Recent):  Temp: 98.2 °F (36.8 °C) (10/10/22 0300)  Pulse: 65 (10/10/22 0706)  Resp: 18 (10/10/22 0706)  BP: (!) 150/63 (10/10/22 0706)  SpO2: 100 % (10/10/22 0706)   Vital Signs (24h Range):  Temp:  [97.7 °F (36.5 °C)-99 °F (37.2 °C)] 98.2 °F (36.8 °C)  Pulse:  [58-98] 65  Resp:  [5-48] 18  SpO2:  [85 %-100 %] 100 %  BP: ()/(35-67) 150/63     Weight: 85.3 kg (188 lb 0.8 oz)  Body mass index is 26.23 kg/m².      Intake/Output Summary (Last  24 hours) at 10/10/2022 0747  Last data filed at 10/10/2022 0600  Gross per 24 hour   Intake 581.88 ml   Output 535 ml   Net 46.88 ml        NORepinephrine bitartrate-D5W Stopped (10/09/22 1555)         Physical Exam  Vitals and nursing note reviewed.   Constitutional:       General: He is awake.      Appearance: He is overweight. He is ill-appearing. He is not toxic-appearing.   HENT:      Head: Atraumatic.   Eyes:      Conjunctiva/sclera: Conjunctivae normal.   Neck:      Vascular: No JVD.   Cardiovascular:      Rate and Rhythm: Normal rate and regular rhythm.      Pulses:           Radial pulses are 2+ on the right side and 2+ on the left side.   Pulmonary:      Effort: Pulmonary effort is normal.      Breath sounds: Decreased breath sounds and rhonchi present. No wheezing.   Abdominal:      General: There is no distension.      Palpations: Abdomen is soft.   Musculoskeletal:      Right lower leg: No edema.      Left lower leg: No edema.   Skin:     General: Skin is warm and dry.      Capillary Refill: Capillary refill takes 2 to 3 seconds.          Neurological:      Mental Status: He is alert.      GCS: GCS eye subscore is 4. GCS verbal subscore is 5. GCS motor subscore is 6.   Psychiatric:         Attention and Perception: He is attentive.         Mood and Affect: Affect is blunt.         Speech: Speech normal.         Behavior: Behavior is not agitated or aggressive. Behavior is cooperative.         Judgment: Judgment is impulsive.       Vents:  Oxygen Concentration (%): 28 (10/08/22 2015)    Lines/Drains/Airways       Peripherally Inserted Central Catheter Line  Duration             PICC Double Lumen 09/18/22 1738 right basilic 21 days              Drain  Duration                  Hemodialysis AV Fistula 09/14/22 0717 Left upper arm 26 days         Urethral Catheter 10/06/22 2230 3 days         Rectal Tube 10/09/22 1700 rectal tube w/ balloon (indicate number of mLs) <1 day                    Significant  Labs:    CBC/Anemia Profile:  Recent Labs   Lab 10/08/22  0857 10/09/22  0411 10/10/22  0430   WBC 10.89 13.05* 7.04   HGB 8.3* 9.3* 8.7*   HCT 27.5* 30.6* 29.1*    342 295   MCV 94 92 92   RDW 17.2* 17.2* 17.6*          Chemistries:   Recent Labs   Lab 10/08/22  0857 10/09/22  0411 10/09/22  0656 10/10/22  0430   *  --  132* 133*   K 4.7  --  4.9 4.6     --  105 107   CO2 13*  --  12* 14*   BUN 22  --  23 29*   CREATININE 3.5*  --  3.7* 4.0*   CALCIUM 7.4*  --  7.8* 8.3*   ALBUMIN  --   --  1.9*  --    PROT  --   --  5.0*  --    BILITOT  --   --  0.3  --    ALKPHOS  --   --  100  --    ALT  --   --  <5*  --    AST  --   --  6*  --    MG  --  2.1  --  2.3           Significant Imaging:   I have reviewed all pertinent imaging results/findings within the past 24 hours.    ABG  Recent Labs   Lab 10/08/22  0148   PH 7.326*   PO2 64*   PCO2 26.4*   HCO3 13.8*   BE -12     Assessment/Plan:     Neuro  Encephalopathy, metabolic  Improving with optimization of BP  Concern that hallucinations may be related to steroid, will begin weaning    Pulmonary  Chronic obstructive pulmonary disease  Not in exacerbation  -nebulizer treaments prn  -supplemental oxygen as needed     Cardiac/Vascular  Hypotension  Afebrile and no leukocytosis post-op  Hgb stable    No obvious sign of bleeding.  Continue Midodrine; florinef; will begin weaning stress dose steroid  Off levophed > 12 hours    Renal/   donor kidney transplant for DM 16, DINORAH on CKD  S/p kidney transplant   tacrolimus continued  Was also on mycophenylate earlier this hospitalization; discontinued  by renal due to infection.   -Continue to hold mycophenylate for now  Also creatinine up 4.0  Bicarb up today to 14 - NAGMA despite oral Bicarb 1300 TID  Renal on board  No acute RRT indication but may trend toward that; discussed this potential with patient today with improved neuro status, he at first states that he would not want dialysis again  but then also states he would not desire hospice. Given continued renal decline, high potential for dialysis indication in near days, Palliative team consulted to help discuss this decision making    ID  * Acute osteomyelitis of left calcaneus  S/p BKA 10/6    Endocrine  Type 2 diabetes mellitus with hyperglycemia, with long-term current use of insulin  Increase levemir dose and frequency  Continue SSI with monitoring for glucose control and prevention of insulin toxicity    Other  MARIA INES (obstructive sleep apnea)  Ordered nocturnal BiPap to match home settings     Critical Care Daily Checklist:    A: Awake: RASS Goal/Actual Goal: RASS Goal: 0-->alert and calm  Actual: Villavicencio Agitation Sedation Scale (RASS): Restless   B: Spontaneous Breathing Trial Performed?     C: SAT & SBT Coordinated?  n/a                      D: Delirium: CAM-ICU Overall CAM-ICU: Positive   E: Early Mobility Performed? Yes   F: Feeding Goal: Goals: 1. Pt will consume 75% PO intake by RD follow up 2. Pt will consume 50% supplements by RD follow up  Status: Nutrition Goal Status: new   Current Diet Order   Procedures    Diet diabetic [facility]; 1500 Calorie; Cardiac (Low Na/Chol)     Order Specific Question:   Indicate patient location for additional diet options:     [facility]     Order Specific Question:   Total calories:     Answer:   1500 Calorie     Order Specific Question:   Additional Diet Options:     Answer:   Cardiac (Low Na/Chol)      AS: Analgesia/Sedation prn   T: Thromboembolic Prophylaxis heparin   H: HOB > 300 Yes   U: Stress Ulcer Prophylaxis (if needed) pepcid   G: Glucose Control SSI prn   B: Bowel Function Stool Occurrence: 1   I: Indwelling Catheter (Lines & Woody) Necessity reviewed   D: De-escalation of Antimicrobials/Pharmacotherapies reviewed    Plan for the day/ETD  Transfer out of ICU; Pulmonary / Critical care team will sign off with transfer    Code Status:  Family/Goals of Care: DNR  Pending  hospital course    I have discussed case and plan of care in detail with Dr Bullock and Dr Ng; Status and plan of care were discussed with team on multidisciplinary rounds.    Critical Care Time: 40 minutes  Critical secondary to acute on chronic renal failure with encephalopathy and hypotension  Critical care was time spent personally by me on the following activities: development of treatment plan with patient or surrogate and bedside caregivers, discussions with consultants, evaluation of patient's response to treatment, examination of patient, ordering and performing treatments and interventions, ordering and review of laboratory studies, ordering and review of radiographic studies, pulse oximetry, re-evaluation of patient's condition. This critical care time did not overlap with that of any other provider or involve time for any procedures.     FIDEL Miller-BC  Critical Care Medicine  [facility] ([facility])
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
## 2022-10-10 NOTE — PT/OT/SLP PROGRESS
"Physical Therapy Treatment    Patient Name:  Lavelle Ladd   MRN:  [id number]    Recommendations:     Discharge Recommendations:  nursing facility, skilled   Discharge Equipment Recommendations:  (TBD AT NEXT LEVEL OF CARE)   Barriers to discharge: None    Assessment:     Lavelle Ladd is a 67 y.o. male admitted with a medical diagnosis of Acute osteomyelitis of left calcaneus.  He presents with the following impairments/functional limitations:  weakness, impaired endurance, impaired functional mobility, gait instability, pain, impaired balance, decreased safety awareness, decreased lower extremity function, decreased coordination.    Rehab Prognosis: Poor; patient would benefit from acute skilled PT services to address these deficits and reach maximum level of function.    Recent Surgery: Procedure(s) (LRB):  AMPUTATION, BELOW KNEE (Left) 4 Days Post-Op    Plan:     During this hospitalization, patient to be seen 3 x/week to address the identified rehab impairments via therapeutic activities, therapeutic exercises and progress toward the following goals:    Plan of Care Expires:  10/21/22    Subjective     Chief Complaint: PAIN, PT AGITATED AND REPORTS TO THERAPISTS, "I can take you out in one swing"  PT NOTIFIED OF HOW INAPPROPRIATE HIS STATEMENT WAS  Patient/Family Comments/goals:   Pain/Comfort:  Pain Rating 1:  (NO PAIN NUMBER GIVE BUT PT SCREAMING IN PAIN WITH ALL TOUCH AND MOVEMENT)  Location - Side 1: Left  Location - Orientation 1: lower  Location 1: leg  Pain Addressed 1: Reposition, Distraction, Cessation of Activity      Objective:     Communicated with NURSE CEBALLOS prior to session.  Patient found supine with telemetry, pulse ox (continuous), bowel management system, peripheral IV, PICC line, grullon catheter, blood pressure cuff upon PT entry to room.     General Precautions: Standard, fall   Orthopedic Precautions:N/A (L BKA)   Braces: N/A  Respiratory Status: Room air     Functional Mobility:  Bed " "Mobility:     Rolling Left:  total assistance and of 2 persons  Rolling Right: total assistance and of 2 persons  Scooting: total assistance and of 2 persons  Bridging: total assistance and of 2 persons  Supine to Sit: total assistance and of 2 persons  Sit to Supine: total assistance and of 2 persons  Balance: POOR- SITTING BALANCE SEATED AT EOB, PT PUSHING HARD POSTERIORLY TO RETURN TO SUPINE, RESISTING ASSISTANCE TO SIT UPRIGHT    AM-PAC 6 CLICK MOBILITY  Turning over in bed (including adjusting bedclothes, sheets and blankets)?: 2  Sitting down on and standing up from a chair with arms (e.g., wheelchair, bedside commode, etc.): 1  Moving from lying on back to sitting on the side of the bed?: 1  Moving to and from a bed to a chair (including a wheelchair)?: 1  Need to walk in hospital room?: 1  Climbing 3-5 steps with a railing?: 1  Basic Mobility Total Score: 7     Therapeutic Activities and Exercises:  REVIEW ROLE OF P.T. AND POC IN ACUTE CARE HOSPITAL SETTING, EDUCATED IN AND ENCOURAGED TO PERFORM BLE THEREX WHILE SUPINE THROUGHOUT THE DAY TO TOLERANCE: HIP FLEX/EXT, HIP ABD/ADD, QUAD SET.  PT NOT AGREEABLE TO TEACHING  PT AGITATED AND ARGUMENTATIVE THROUGHOUT TX. DESPITE MAX EDUCATION AND ENCOURAGEMENT, REFUSES ALL SAFETY CUES AND DIRECTION TO PROGRESS TO SITTING EOB, RESISTIVE TO ALL MOVEMENT DESPITE ENCOURAGEMENT TO ASSIST IN TF, RETURN TO SUPINE PER PT'S REQUEST   PT EDUCATED ON RISK FOR FALLS DUE TO GENERALIZED WEAKNESS, EDUCATED ON "CALL DON'T FALL", ENCOURAGED TO CALL FOR ASSISTANCE WITH ALL NEEDS     Patient left HOB elevated with all lines intact, call button in reach, bed alarm on, NURSE notified, and NURSE present..    GOALS:   Multidisciplinary Problems       Physical Therapy Goals          Problem: Physical Therapy    Goal Priority Disciplines Outcome Goal Variances Interventions   Physical Therapy Goal     PT, PT/OT Ongoing, Not Progressing     Description: Pt will perform bed mobility " independently in order to participate in EOB activity.  Pt will perform transfers SBA with sliding board in order to participate in OOB activity.   Pt will perform w/c mobility with SUP in order to participate in daily tasks.    Pt will tolerate sitting OOB x 3-4 hrs to participate in daily tasks.                       Time Tracking:     PT Received On: 10/10/22  PT Start Time: 0810     PT Stop Time: 0835  PT Total Time (min): 25 min     Billable Minutes: Therapeutic Activity 25    Treatment Type: Treatment  PT/PTA: PT     PTA Visit Number: 0     10/10/2022

## 2022-10-10 NOTE — PROGRESS NOTES
[facility] - Intensive [facility] ([facility])  Nephrology  Progress Note    Patient Name: Lavelle Ladd  MRN: [id number]  Admission Date: 9/13/2022  Hospital Length of Stay: 26 days  Attending Provider: Donal Mcdonald MD   Primary Care Physician: Primary Doctor No  Principal Problem:Acute osteomyelitis of left calcaneus  Reason for Consult: DINORAH in Kidney transplant status   Primary Nephrologist: [facility], last seen by Dr. Estrada 8/2020      Subjective:     HPI:   68 yo male with kidney transplant status last seen by Nephrology during this hospital stay for DINORAH which improved rapidly. Since then pt underwent a L BKA yesterday with subsequent hypotension requiring vasopressor support and expected anemia receiving blood transfusion currently.   Baseline allograft creatinine is around 1.4mg/dL. He has sustained several DINORAH insults in the past two years. Post operative creatinine was 1.7 and has increased to 2.8mg/dL this morning. UOP is decreased.   He denies any complaints currently, his partner is at bedside who assisted in feeding him lunch.     10/8  - delirious today  - poor UOP    10/9  - positive fluid balance, on NC today    10/10  - most lucid since I have seen him and able to have a conversation regarding dialysis. He tells me he does not want dialysis but agrees he does not need dialysis today. He is most concerned with his current phantom pain in his foot      Review of patient's allergies indicates:  No Known Allergies  Current Facility-Administered Medications   Medication Frequency    0.9%  NaCl infusion (for blood administration) Q24H PRN    acetaminophen tablet 650 mg Q4H PRN    albuterol-ipratropium 2.5 mg-0.5 mg/3 mL nebulizer solution 3 mL Q6H PRN    aluminum-magnesium hydroxide-simethicone 200-200-20 mg/5 mL suspension 30 mL QID PRN    cholestyramine 4 gram packet 4 g BID    dextrose 10% bolus 125 mL PRN    dextrose 10% bolus 250 mL PRN    epoetin dyana-epbx injection 4,880 Units Every Mon, Wed, Fri     famotidine tablet 20 mg Daily    fludrocortisone tablet 100 mcg Daily    glucagon (human recombinant) injection 1 mg PRN    glucose chewable tablet 16 g PRN    glucose chewable tablet 24 g PRN    heparin (porcine) injection 5,000 Units Q12H    hydrocortisone sodium succinate injection 40 mg Q6H    HYDROmorphone injection 1 mg Q4H PRN    HYDROmorphone tablet 2 mg Q3H PRN    influenza (QUADRIVALENT ADJUVANTED PF) vaccine 0.5 mL vaccine x 1 dose    insulin aspart U-100 pen 1-10 Units QID (AC + HS) PRN    insulin detemir U-100 pen 12 Units BID    Lactobacillus acidoph-L.bulgar 1 million cell tablet 4 tablet TID WM    melatonin tablet 6 mg Nightly PRN    miconazole nitrate 2% ointment BID    midodrine tablet 10 mg Q8H    naloxone 0.4 mg/mL injection 0.02 mg PRN    ondansetron disintegrating tablet 4 mg Q6H PRN    ondansetron injection 4 mg Q8H    prochlorperazine injection Soln 5 mg Q6H PRN    sertraline tablet 25 mg Daily    simethicone chewable tablet 160 mg TID PRN    sodium bicarbonate tablet 1,300 mg TID    sodium chloride 0.9% flush 10 mL Q8H PRN    tacrolimus capsule 1 mg BID           Review of Systems   Constitutional:  Negative for fever.   Genitourinary:  Positive for decreased urine volume.   Allergic/Immunologic: Positive for immunocompromised state.   Objective:     Vital Signs (Most Recent):  Temp: 97.1 °F (36.2 °C) (10/10/22 0700)  Pulse: 70 (10/10/22 1214)  Resp: 16 (10/10/22 1418)  BP: (!) 111/57 (10/10/22 1214)  SpO2: 95 % (10/10/22 1214)  O2 Device (Oxygen Therapy): room air (10/10/22 0706)   Vital Signs (24h Range):  Temp:  [97.1 °F (36.2 °C)-99 °F (37.2 °C)] 97.1 °F (36.2 °C)  Pulse:  [58-77] 70  Resp:  [5-35] 16  SpO2:  [92 %-100 %] 95 %  BP: (103-160)/(46-67) 111/57     Weight: 85.3 kg (188 lb 0.8 oz) (10/08/22 0627)  Body mass index is 26.23 kg/m².  Body surface area is 2.07 meters squared.    I/O last 3 completed shifts:  In: 1675.3 [P.O.:200; I.V.:1455.3; NG/GT:20]  Out: 738 [Urine:438;  Stool:300]    Physical Exam  Vitals and nursing note reviewed.   Constitutional:       Appearance: He is ill-appearing.      Comments:      Cardiovascular:      Rate and Rhythm: Normal rate.      Heart sounds:     No friction rub.   Pulmonary:      Effort: Pulmonary effort is normal. No respiratory distress.      Breath sounds: No wheezing.   Neurological:      Mental Status: He is alert.       Significant Labs:  reviewed    Assessment/Plan:     Active Diagnoses:    Diagnosis Date Noted POA    PRINCIPAL PROBLEM:  Acute osteomyelitis of left calcaneus [M86.172] 2022 Yes    Palliative care encounter [Z51.5] 10/10/2022 Not Applicable    Encephalopathy, metabolic [G93.41] 10/09/2022 No    Hypotension [I95.9] 10/06/2022 No    S/P BKA (below knee amputation), left [Z89.512] 10/06/2022 Not Applicable    Peripheral artery disease [I73.9] 2022 Yes    Infection of deep incisional surgical site after procedure [T81.42XA] 09/15/2022 Yes    MARIA INES (obstructive sleep apnea) [G47.33] 2020 Yes    Old MI (myocardial infarction) [I25.2] 2019 Not Applicable    Stage 3 chronic kidney disease, DINORAH on CKD 3 [N18.30]  Yes    Long term current use of immunosuppressive drug [Z79.899] 2018 Not Applicable    Chronic obstructive pulmonary disease [J44.9] 2016 Yes    Coronary artery disease of native artery of native heart with stable angina pectoris [I25.118] 2016 Yes    Type 2 diabetes mellitus with hyperglycemia, with long-term current use of insulin [E11.65, Z79.4]  Not Applicable     donor kidney transplant for DM 16, DINORAH on CKD [Z94.0]  Not Applicable     Chronic    Essential hypertension [I10] 2015 Yes      Problems Resolved During this Admission:    Diagnosis Date Noted Date Resolved POA    Hyponatremia [E87.1] 2022 Yes    H/O amputation [Z89.9] 2016 10/05/2022 Yes       DINORAH on CKD kidney transplant status, baseline creatinine around 1.4mg/dL  - second DINORAH  episode since admission  - delta creatinine improved yesterday, similar rise today  - more likely ATN as improved hemodynamics did not improve kidney function  - no indications for dialysis today, and unsure if pt would be agreeable    Volume Status  - appears less overloaded today  - re-dosed Lasix this am at 40mg with poor response, will redose higher Lasix this pm     Metabolic acidosis  - very common post txp but worsening due to DINORAH  - continue po bicarb    Anemia  - transfuse prn    Kidney transplant status  - goal trough 4-7, last level of 9.9 not true trough, repeat pending   - continue current dose of Prograf   - continue to hold MMF     Delirium, improving  - multifactorial  - off gabapentin, last dose given today   - stop Bentyl    Discussed with Palliative Care    Gianni Masterson MD  Nephrology

## 2022-10-10 NOTE — CONSULTS
Advance Care Planning    Consult Note  Palliative Medicine      Consult Requested By: Dr. Arcos  Reason for Consult: Goals of care    SUBJECTIVE:     History of Present Illness:  Lavelle Ladd is a 67 y.o. year old with a history of PVD s/p R BKA, CKD stage III with kidney transplant, and CAD who presented to the emergency department due to concern of post operative left ankle wound. He suffered left tibial and fibular shaft fractures with application of external fixation device on 8/1/22 and postoperative course complicated by incisional infection. The ex fix was removed on 9/17 and wound vac was placed with continued IV antibiotics. Over the last month he has suffered continued infection, acute on chronic renal failure, nonhealing wounds, and ultimately proceeded with L BKA. His renal function has worsened prompting the discussion of renal replacement. Palliative Medicine was consulted to assist with goals of care discussion. We met Mr. Ladd with his sister Kecia at bedside. We discussed the events since admission and now concern of renal failure. He understands that HD has greg mentioned and would like to avoid this if at all possible but is not ready to accept the alternative of not pursuing HD. We discussed this in detail and while he is not sure he would be okay with lifelong dialysis he admits that he could always stop HD later. He was quite clear during the initial part of our conversation but as time went on became a bit confused and cantankerous. He also admitted that he saw the trash can move and then told us about his hallucinations last night. I suspect he has an element of delirium but also sensitive to opioids given reduced renal clearance. He appointed his sister Kecia as surrogate (Darline Barnhart NP witnessed) but does not have formal HCPOA. He has one son who is incarcerated and 2 other siblings (brothers). We spoke to Kecia in the mann and she is concerned that starting dialysis will be  futile and merely postpone the inevitable. She fears (as do I) that his bilateral amputee status will reduce his prior independence and result in him living in a LTC for the rest of his life. There is also the logistical concern of transferring from chair to dialysis chair. She does not think that he would find dialysis dependent life an acceptable standard of living and also fears that his body would not tolerate the treatments. She is inclined to not pursue HD but is open to more discussion with the nephrologist and  as to whether we can formulate a solid discharge disposition. We will continue to follow along.         Past Medical History:   Diagnosis Date    DINORAH (acute kidney injury) 2016    Arthritis     CAD in native artery 2019    CHF (congestive heart failure)     Chronic obstructive pulmonary disease 2016    Coronary artery disease involving native coronary artery of native heart without angina pectoris 2016    CRI (chronic renal insufficiency) 2019     donor kidney transplant for DM 16     Induction with Thymo x3 and IV solumedrol to total 875mg  Kidney Biopsy  2016: 16 glomeruli, ACR type 1 AVR type 2, significant microcirculatory changes, c4d negative, No DSA, 5 to10% fibrosis. Treated with thymo x8 2016- no rejection      Diastolic heart failure     Encounter for blood transfusion     ESRD on RRT since 10/2013 10/29/2013    Biopsy proven diabetic nephropathy and lymphoplasmacytic interstitial infiltrate not c/w with AIN (ddx sjogrens or assoc with tamm-horsefall protein extravasation)     GERD (gastroesophageal reflux disease)     History of hepatitis C, s/p successful Rx w/ SVR12 - 2017    Completed 12 weeks harvoni w/ SVR    Hyperlipidemia     Hypertension     PAD (peripheral artery disease) 2019    PIC line (peripherally inserted central catheter) flush     Prophylactic immunotherapy     Proteinuria     PVD (peripheral  vascular disease) 6/26/2017    RLE BKA CT 12/11/16 Extensive atherosclerotic disease of the aorta and mesenteric arteries.     Renal hypertension     Type 2 diabetes mellitus with diabetic neuropathy, with long-term current use of insulin 12/1/2016    Vitamin B12 deficiency      Past Surgical History:   Procedure Laterality Date    ANGIOGRAPHY OF LOWER EXTREMITY N/A 9/23/2022    Procedure: ANGIOGRAM, LOWER EXTREMITY/left leg;  Surgeon: Donal Mcdonald MD;  Location: [facility] CATH LAB;  Service: Cardiovascular;  Laterality: N/A;    ANGIOGRAPHY OF LOWER EXTREMITY N/A 9/29/2022    Procedure: ANGIOGRAM, LOWER EXTREMITY/left leg;  Surgeon: Donal Mcdonald MD;  Location: [facility] CATH LAB;  Service: Peripheral Vascular;  Laterality: N/A;  resched from 9/23 pt ready now    AORTOGRAPHY WITH SERIALOGRAPHY N/A 6/14/2018    Procedure: LEFT LEG ANGIOGRAM;  Surgeon: Donal Mcdonald MD;  Location: [facility] CATH LAB;  Service: Vascular;  Laterality: N/A;    APPLICATION OF LARGE EXTERNAL FIXATION DEVICE TO TIBIA Left 8/4/2022    Procedure: APPLICATION, EXTERNAL FIXATION DEVICE, LARGE, TIBIA;  Surgeon: Good Villa MD;  Location: [facility] OR;  Service: Orthopedics;  Laterality: Left;    av bovine graft      Left UE    AV FISTULA PLACEMENT      left UE    BELOW KNEE AMPUTATION OF LOWER EXTREMITY Left 10/6/2022    Procedure: AMPUTATION, BELOW KNEE;  Surgeon: Donal Mcdonald MD;  Location: [facility];  Service: Vascular;  Laterality: Left;    CARDIAC CATHETERIZATION  02/2015    CLOSED REDUCTION OF INJURY OF TIBIA Left 8/4/2022    Procedure: CLOSED REDUCTION, TIBIA;  Surgeon: Good Villa MD;  Location: [facility] OR;  Service: Orthopedics;  Laterality: Left;  Closed reduction left tibial and fibular shaft fractures    CLOSURE OF WOUND Left 9/24/2018    Procedure: CLOSURE, WOUND;  Surgeon: Karla Wheeler DPM;  Location: [facility] OR;  Service: Podiatry;  Laterality: Left;  Secondary Wound closure, extensive    CLOSURE OF WOUND Left 11/5/2018    Procedure: CLOSURE,  WOUND;  Surgeon: Karla Wheeler DPM;  Location: [facility] OR;  Service: Podiatry;  Laterality: Left;    DEBRIDEMENT OF MULTIPLE METATARSAL BONES Left 11/5/2018    Procedure: DEBRIDEMENT, METATARSAL BONE, 2 OR MORE BONES;  Surgeon: Karla Wheeler DPM;  Location: [facility] OR;  Service: Podiatry;  Laterality: Left;    EXCISION OF SKIN Left 9/27/2019    Procedure: EXCISION, SKIN;  Surgeon: Lenard Alarcon MD;  Location: [address];  Service: Plastics;  Laterality: Left;  Plastics set, NIMS monitor, ACell    FOOT AMPUTATION THROUGH METATARSAL Left 9/21/2018    Procedure: AMPUTATION, FOOT, TRANSMETATARSAL;  Surgeon: Karla Wheeler DPM;  Location: [facility] OR;  Service: Podiatry;  Laterality: Left;    FOOT AMPUTATION THROUGH METATARSAL Left 10/31/2018    Procedure: AMPUTATION, FOOT, TRANSMETATARSAL;  Surgeon: Karla Wheeler DPM;  Location: [facility] OR;  Service: Podiatry;  Laterality: Left;    FOOT AMPUTATION THROUGH METATARSAL Left 11/5/2018    Procedure: AMPUTATION, FOOT, TRANSMETATARSAL;  Surgeon: Karla Wheeler DPM;  Location: [facility] OR;  Service: Podiatry;  Laterality: Left;  revisional transmetatarsal amputation, Left foot    IRRIGATION AND DEBRIDEMENT OF LOWER EXTREMITY Left 10/31/2018    Procedure: IRRIGATION AND DEBRIDEMENT, LOWER EXTREMITY;  Surgeon: Karla Wheeler DPM;  Location: [facility] OR;  Service: Podiatry;  Laterality: Left;    KIDNEY TRANSPLANT  05/21/2016    LEFT HEART CATHETERIZATION Left 7/21/2019    Procedure: CATHETERIZATION, HEART, LEFT;  Surgeon: Andrew Valdez MD;  Location: [facility] CATH LAB;  Service: Cardiology;  Laterality: Left;    LEG AMPUTATION THROUGH KNEE  2011    right LE, started as nail puncture leading to diabetic ulcer    REMOVAL OF EXTERNAL FIXATION DEVICE Left 9/17/2022    Procedure: REMOVAL, EXTERNAL FIXATION DEVICE;  Surgeon: Kathleen Zazueta MD;  Location: [facility] OR;  Service: Orthopedics;  Laterality: Left;    SKIN FULL THICKNESS GRAFT Left 10/7/2019    Procedure:  APPLICATION, GRAFT, SKIN, FULL-THICKNESS;  Surgeon: Lenard Alarcon MD;  [facility];  Service: Plastics;  Laterality: Left;    SURGICAL REMOVAL OF LESION OF FACE Right 10/7/2019    Procedure: EXCISION, LESION, FACE;  Surgeon: Lenard Alarcon MD;  Location: [facility] OR 2ND FLR;  Service: Plastics;  Laterality: Right;     Family History   Problem Relation Age of Onset    Cancer Father     Diabetes Father     Heart failure Father     Stroke Father     Heart failure Mother     Kidney disease Neg Hx        Social History     Socioeconomic History    Marital status: Single   Occupational History    Occupation: Disabled     Employer: DISABLED   Tobacco Use    Smoking status: Former     Packs/day: 1.00     Years: 40.00     Pack years: 40.00     Types: Cigarettes     Quit date: 2013     Years since quittin.7    Smokeless tobacco: Never   Substance and Sexual Activity    Alcohol use: Yes     Alcohol/week: 6.0 standard drinks     Types: 6 Cans of beer per week     Comment: seldom    Drug use: No    Sexual activity: Never   Social History Narrative    Lives alone. Retired from construction and various jobs, now retired. Would like to return to work PT to alleviate boredom.      Review of patient's allergies indicates:  No Known Allergies    Medications:    Current Facility-Administered Medications:     0.9%  NaCl infusion (for blood administration), , Intravenous, Q24H PRN, FIDEL Miller-BC    acetaminophen tablet 650 mg, 650 mg, Oral, Q4H PRN, Donal Mcdonald MD, 650 mg at 10/01/22 1001    albuterol-ipratropium 2.5 mg-0.5 mg/3 mL nebulizer solution 3 mL, 3 mL, Nebulization, Q6H PRN, Donal Mcdonald MD    aluminum-magnesium hydroxide-simethicone 200-200-20 mg/5 mL suspension 30 mL, 30 mL, Oral, QID PRN, Donal Mcdonald MD, 30 mL at 22 0102    cholestyramine 4 gram packet 4 g, 1 packet, Oral, BID, Donal Mcdonald MD, 4 g at 10/10/22 0847    dextrose 10% bolus 125 mL, 12.5 g, Intravenous, PRN, Donal Mcdonald MD     dextrose 10% bolus 250 mL, 25 g, Intravenous, PRN, Donal Mcdonald MD    dicyclomine capsule 10 mg, 10 mg, Oral, QID (AC & HS), Donal Mcdonald MD, 10 mg at 10/10/22 0548    epoetin dyana-epbx injection 4,880 Units, 50 Units/kg, Subcutaneous, Every Mon, Wed, Fri, Donal Mcdonald MD, 4,880 Units at 10/07/22 0815    famotidine tablet 20 mg, 20 mg, Oral, Daily, Jose Arcos MD, 20 mg at 10/10/22 0818    fludrocortisone tablet 100 mcg, 100 mcg, Oral, Daily, ANGELA Miller, 100 mcg at 10/10/22 0848    gabapentin capsule 100 mg, 100 mg, Oral, Daily, Gianni Masterson MD, 100 mg at 10/10/22 0802    glucagon (human recombinant) injection 1 mg, 1 mg, Intramuscular, PRN, Donal Mcdonald MD    glucose chewable tablet 16 g, 16 g, Oral, PRN, Donal Mcdonald MD    glucose chewable tablet 24 g, 24 g, Oral, PRN, Donal Mcdonald MD    heparin (porcine) injection 5,000 Units, 5,000 Units, Subcutaneous, Q12H, Jose Arcos MD, 5,000 Units at 10/10/22 0818    hydrocortisone sodium succinate injection 40 mg, 40 mg, Intravenous, Q6H, ANGELA Miller    HYDROmorphone injection 1 mg, 1 mg, Intravenous, Q4H PRN, Donal Mcdonald MD, 1 mg at 10/10/22 0801    HYDROmorphone tablet 2 mg, 2 mg, Oral, Q3H PRN, Jose Arcos MD, 2 mg at 10/09/22 0446    influenza (QUADRIVALENT ADJUVANTED PF) vaccine 0.5 mL, 0.5 mL, Intramuscular, vaccine x 1 dose, Jose Arcos MD    insulin aspart U-100 pen 1-10 Units, 1-10 Units, Subcutaneous, QID (AC + HS) PRN, ANGELA Miller, 6 Units at 10/10/22 0552    insulin detemir U-100 pen 12 Units, 12 Units, Subcutaneous, BID, ANGELA Miller, 12 Units at 10/10/22 0849    Lactobacillus acidoph-L.bulgar 1 million cell tablet 4 tablet, 4 tablet, Oral, TID WM, Donal Mcdonald MD, 4 tablet at 10/10/22 0802    melatonin tablet 6 mg, 6 mg, Oral, Nightly PRN, Donal Mcdonald MD, 6 mg at 10/09/22 2036    miconazole nitrate 2% ointment, , Topical (Top), BID, Donal Mcdonald MD, Given at 10/10/22 1004     midodrine tablet 10 mg, 10 mg, Oral, Q8H, Jose Arcos MD, 10 mg at 10/09/22 2212    naloxone 0.4 mg/mL injection 0.02 mg, 0.02 mg, Intravenous, PRN, Donal Mcdonald MD    ondansetron disintegrating tablet 4 mg, 4 mg, Oral, Q6H PRN, Donal Mcdonald MD, 4 mg at 09/26/22 1536    ondansetron injection 4 mg, 4 mg, Intravenous, Q8H, Donal Mcdonald MD, 4 mg at 10/10/22 0547    prochlorperazine injection Soln 5 mg, 5 mg, Intravenous, Q6H PRN, Donal Mcdonald MD    sertraline tablet 25 mg, 25 mg, Oral, Daily, Donal Mcdonald MD, 25 mg at 10/10/22 0817    simethicone chewable tablet 160 mg, 2 tablet, Oral, TID PRN, Donal Mcdonald MD, 160 mg at 09/22/22 2021    sodium bicarbonate tablet 1,300 mg, 1,300 mg, Oral, TID, FIDEL Miller-BC, 1,300 mg at 10/10/22 0817    sodium chloride 0.9% flush 10 mL, 10 mL, Intravenous, Q8H PRN, Donal Mcdonald MD    tacrolimus capsule 1 mg, 1 mg, Oral, BID, Donal Mcdonald MD, 1 mg at 10/10/22 0802    ROS:  Review of Systems   Constitutional:  Positive for activity change. Negative for appetite change, chills and fever.   HENT:  Negative for sore throat and trouble swallowing.    Respiratory:  Negative for cough and shortness of breath.    Gastrointestinal:  Negative for abdominal pain and constipation.   Musculoskeletal:  Positive for arthralgias. Negative for back pain and myalgias.   Skin:  Negative for rash and wound.   Allergic/Immunologic: Negative for immunocompromised state.   Neurological:  Negative for weakness and headaches.   Psychiatric/Behavioral:  Positive for hallucinations. Negative for confusion and sleep disturbance.      OBJECTIVE:     Physical Exam:  Vitals: Temp: 97.1 °F (36.2 °C) (10/10/22 0700)  Pulse: 66 (10/10/22 1000)  Resp: 17 (10/10/22 1000)  BP: 135/62 (10/10/22 1000)  SpO2: (!) 94 % (10/10/22 1000)    Physical Exam  Vitals reviewed.   Constitutional:       General: He is not in acute distress.     Appearance: Normal appearance. He is well-developed.   HENT:      Head:  Normocephalic and atraumatic.   Eyes:      Conjunctiva/sclera: Conjunctivae normal.   Cardiovascular:      Rate and Rhythm: Normal rate and regular rhythm.      Heart sounds: Normal heart sounds. No murmur heard.  Pulmonary:      Effort: Pulmonary effort is normal. No respiratory distress.      Breath sounds: Normal breath sounds.   Abdominal:      General: Bowel sounds are normal. There is no distension.      Palpations: Abdomen is soft.      Tenderness: There is no abdominal tenderness.   Musculoskeletal:      Cervical back: Normal range of motion.      Comments: S/p sergey BKA   Skin:     General: Skin is warm and dry.      Coloration: Skin is pale.      Findings: No rash.   Neurological:      Mental Status: He is alert and oriented to person, place, and time.      Cranial Nerves: No cranial nerve deficit.   Psychiatric:         Attention and Perception: He perceives visual hallucinations.         Mood and Affect: Affect is labile.         Speech: Speech normal.         Thought Content: Thought content normal.      Comments: Mood and behavior wax and wane between argumentative and cooperative         Review of Symptoms      Symptom Assessment (ESAS 0-10 Scale)  Pain:  0  Dyspnea:  0  Anxiety:  0  Nausea:  0  Depression:  0  Anorexia:  0  Fatigue:  0  Insomnia:  0  Restlessness:  0  Agitation:  0     CAM / Delirium:  Positive    Constipation:  No constipation    Karnofsky Performance Scale:  30%    Living Arrangements:  Lives alone    Advance Directives:   Do Not Resuscitate Status: Yes    Medical Power of : No        Oral Declaration: Yes   Witnesses:  Darline Barnhart and Norma Thompson   Agent's Name:  Sister Kecia Sagastume    Decision Making:  Patient answered questions and Family answered questions  Goals of Care: The patient and family endorses that what is most important right now is to focus on improvement in condition but with limits to invasive therapies    Accordingly, we have decided that the best  plan to meet the patient's goals includes continuing with treatment      Labs:  WBC   Date Value Ref Range Status   10/10/2022 7.04 3.90 - 12.70 K/uL Final       Hemoglobin   Date Value Ref Range Status   10/10/2022 8.7 (L) 14.0 - 18.0 g/dL Final       Hematocrit   Date Value Ref Range Status   10/10/2022 29.1 (L) 40.0 - 54.0 % Final       MCV   Date Value Ref Range Status   10/10/2022 92 82 - 98 fL Final       Platelets   Date Value Ref Range Status   10/10/2022 295 150 - 450 K/uL Final       BMP  Lab Results   Component Value Date     (L) 10/10/2022    K 4.6 10/10/2022     10/10/2022    CO2 14 (L) 10/10/2022    BUN 29 (H) 10/10/2022    CREATININE 4.0 (H) 10/10/2022    CALCIUM 8.3 (L) 10/10/2022    ANIONGAP 12 10/10/2022    ESTGFRAFRICA 47 (A) 08/15/2021    EGFRNONAA 41 (A) 08/15/2021       Lab Results   Component Value Date    AST 6 (L) 10/09/2022    ALKPHOS 100 10/09/2022    BILITOT 0.3 10/09/2022       Albumin   Date Value Ref Range Status   10/09/2022 1.9 (L) 3.5 - 5.2 g/dL Final       Radiology:I have reviewed all pertinent imaging results/findings within the past 24 hours.  Ct abd pelvis 9/22 reviewed    ASSESSMENT   Lavelle Ladd is a 67 y.o. year old with a history of PVD s/p R BKA, CKD stage III with kidney transplant, and CAD who presented to the emergency department due to concern of post operative left ankle wound. He suffered left tibial and fibular shaft fractures with application of external fixation device on 8/1/22 and postoperative course complicated by incisional infection. The ex fix was removed on 9/17 and wound vac was placed with continued IV antibiotics. Over the last month he has suffered continued infection, acute on chronic renal failure, nonhealing wounds, and ultimately proceeded with L BKA. His renal function has worsened prompting the discussion of renal replacement. Palliative Medicine was consulted to assist with goals of care discussion.     PLAN   Encounter for  Palliative Care  - Code status: DNR/I  - Surrogate: named sister Kecia (witnessed by Darline Barnhart NP)  - Details of meeting in hospitals  - Primary outcome of meeting was introduce GOC regarding RRT. He is open to HD but will need exploration on what his functional status will be and logistics of HD treatments.   - Ongoing discussion with team, we will continue to follow along    2. Acute on chronic CKD 3  - Defer to nephrology  - Does not require HD at this time but high risk of continued renal decompensation    3. Osteomyelitis  - s/p L BKA, now with sergey BKA    4. Acute on chronic pain  - Managed by Dr. Medel  - Management complicated by renal failure and delirium      Discussed case and visit details with Dr. Masterson, Dr. Ng, and Pennie Silva NP     Thank you for allowing Palliative Medicine to be involved in the care of Lavelle Ladd.         Medical decision making: HIGH based on high risk of death untreated symptoms high risk medications management of more than one chronic illness in exacerbation or progression of disease managing side effects of medications or polypharmacy parenteral opioids    Plan required increased review of medication choice, interaction, dosing, frequency, and route due to patient complexity. Patient complexity increased by: high risk medication use, being on more than 8 medications, altered mentation, at risk for delirium, renal insufficiency, and continuous use of opioids    30 min ACP time spent discussing: code status, assessed patient specific goals and addressed the best way to achieve them, coordination of care and emotional support, formulating and communicating prognosis, exploring burden/ benefit of various approaches of treatment, inquired about existing or willingness to complete advance directive documents.      Norma Thompson PA-C  Palliative Medicine

## 2022-10-10 NOTE — ASSESSMENT & PLAN NOTE
Creatinine improved  Awaiting angio- completed  Continue tacrolimus- weekly level      10/7:    Noted to have decreased urine output, creatinine trending up, nephrology notified, follow-up on recommendations.      10/8- worsening renal function with decreased UOP and Acidosis- heading towards HD    10/9- persists, BP marginally better, continue to watch, Renal following  Start Solucortef    Overall a little better but cr increased to 4, does not HD at present and does not want HD at present

## 2022-10-10 NOTE — ASSESSMENT & PLAN NOTE
Confused, disoriented since BKA and also hypotensive, acidotic, worsening renal failure, so transferred to ICU and placed on Levophed gtt  No significant improvement so far  Started on Steroids    Better, more lucid now

## 2022-10-10 NOTE — ASSESSMENT & PLAN NOTE
-Orthopedic Surgery following   -IV antibiotics  - Rocephin and Flagyl x 6 weeks  Proteus Mirabilis and B. faecis   -NWB LLE  DVT prophylaxis 24 hours after surgery   -Post op removal of the external fixator and debridement of osteomyelitis   -analgesia as needed   Per Ortho -  He just needs to be set up with home health for wound checks and may follow-up with ortho next week for a recheck ( pt to return to [facility])  --10/2: Plan for Left BKA this week due to minimal improvement and continued systemic symptoms of infection  10/6: Left BKA completed

## 2022-10-10 NOTE — SUBJECTIVE & OBJECTIVE
Review of patient's allergies indicates:  No Known Allergies    Review of Systems   Constitutional:  Positive for malaise/fatigue.   HENT:  Negative for sore throat.    Respiratory:  Negative for shortness of breath.    Cardiovascular:  Negative for chest pain.   Gastrointestinal:  Positive for diarrhea. Negative for abdominal pain, nausea and vomiting.   Musculoskeletal:  Positive for myalgias.   Neurological:  Negative for focal weakness and headaches.   Psychiatric/Behavioral:  Positive for hallucinations. The patient is nervous/anxious.      Objective:     Vital Signs (Most Recent):  Temp: 98.2 °F (36.8 °C) (10/10/22 0300)  Pulse: 65 (10/10/22 0706)  Resp: 18 (10/10/22 0706)  BP: (!) 150/63 (10/10/22 0706)  SpO2: 100 % (10/10/22 0706)   Vital Signs (24h Range):  Temp:  [97.7 °F (36.5 °C)-99 °F (37.2 °C)] 98.2 °F (36.8 °C)  Pulse:  [58-98] 65  Resp:  [5-48] 18  SpO2:  [85 %-100 %] 100 %  BP: ()/(35-67) 150/63     Weight: 85.3 kg (188 lb 0.8 oz)  Body mass index is 26.23 kg/m².      Intake/Output Summary (Last 24 hours) at 10/10/2022 0747  Last data filed at 10/10/2022 0600  Gross per 24 hour   Intake 581.88 ml   Output 535 ml   Net 46.88 ml        NORepinephrine bitartrate-D5W Stopped (10/09/22 1555)         Physical Exam  Vitals and nursing note reviewed.   Constitutional:       General: He is awake.      Appearance: He is overweight. He is ill-appearing. He is not toxic-appearing.   HENT:      Head: Atraumatic.   Eyes:      Conjunctiva/sclera: Conjunctivae normal.   Neck:      Vascular: No JVD.   Cardiovascular:      Rate and Rhythm: Normal rate and regular rhythm.      Pulses:           Radial pulses are 2+ on the right side and 2+ on the left side.   Pulmonary:      Effort: Pulmonary effort is normal.      Breath sounds: Decreased breath sounds and rhonchi present. No wheezing.   Abdominal:      General: There is no distension.      Palpations: Abdomen is soft.   Musculoskeletal:      Right lower  leg: No edema.      Left lower leg: No edema.   Skin:     General: Skin is warm and dry.      Capillary Refill: Capillary refill takes 2 to 3 seconds.          Neurological:      Mental Status: He is alert.      GCS: GCS eye subscore is 4. GCS verbal subscore is 5. GCS motor subscore is 6.   Psychiatric:         Attention and Perception: He is attentive.         Mood and Affect: Affect is blunt.         Speech: Speech normal.         Behavior: Behavior is not agitated or aggressive. Behavior is cooperative.         Judgment: Judgment is impulsive.       Vents:  Oxygen Concentration (%): 28 (10/08/22 2015)    Lines/Drains/Airways       Peripherally Inserted Central Catheter Line  Duration             PICC Double Lumen 09/18/22 1738 right basilic 21 days              Drain  Duration                  Hemodialysis AV Fistula 09/14/22 0717 Left upper arm 26 days         Urethral Catheter 10/06/22 2230 3 days         Rectal Tube 10/09/22 1700 rectal tube w/ balloon (indicate number of mLs) <1 day                    Significant Labs:    CBC/Anemia Profile:  Recent Labs   Lab 10/08/22  0857 10/09/22  0411 10/10/22  0430   WBC 10.89 13.05* 7.04   HGB 8.3* 9.3* 8.7*   HCT 27.5* 30.6* 29.1*    342 295   MCV 94 92 92   RDW 17.2* 17.2* 17.6*          Chemistries:   Recent Labs   Lab 10/08/22  0857 10/09/22  0411 10/09/22  0656 10/10/22  0430   *  --  132* 133*   K 4.7  --  4.9 4.6     --  105 107   CO2 13*  --  12* 14*   BUN 22  --  23 29*   CREATININE 3.5*  --  3.7* 4.0*   CALCIUM 7.4*  --  7.8* 8.3*   ALBUMIN  --   --  1.9*  --    PROT  --   --  5.0*  --    BILITOT  --   --  0.3  --    ALKPHOS  --   --  100  --    ALT  --   --  <5*  --    AST  --   --  6*  --    MG  --  2.1  --  2.3           Significant Imaging:   I have reviewed all pertinent imaging results/findings within the past 24 hours.

## 2022-10-10 NOTE — ASSESSMENT & PLAN NOTE
Improving with optimization of BP  Concern that hallucinations may be related to steroid, will begin weaning

## 2022-10-10 NOTE — PLAN OF CARE
Pt remains off levophed with BP within parameters. PRN medication administered for pain control. Remains confused. Education and safety reviewed

## 2022-10-10 NOTE — PLAN OF CARE
POOR PARTICIPATION, PT ARGUMENTATIVE AND RESISTIVE TO ALL MOVEMENT, TOTAL ASSIST X 2 FOR BED MOBILITY.   [facility]  [address]  [phone]    Postoperative Home Instructions  Lumbar (Back) Surgery    · Showering: You may shower the first day you are home with the dressing on. If the dressing becomes saturated, change it completely to a similar dressing. Leave the steri-strips or glue in place. If you have the brown aquacel dressing, leave it alone for 5 days and then you may remove the dressing. Do not soak in a tub, and use only soap and water on the wound. · Wound Care: A small to moderate amount of reddish drainage on the dressing is normal the first 1-2 days after surgery. If the dressing becomes saturated, change it. Large amounts of clear, watery drainage is not normal and you should call our office immediately. · Signs of Infection: Extreme tenderness at the wound, excessive redness and/or swelling, or ugly yellowish-greenish drainage from the wound. Fever greater than 100.5 may be present. If you think you have a wound infection, call our office immediately. · Driving: You may not begin driving until after your visit to our office for a wound and suture check which is normally 7-10 days after you come home from the hospital.    · Medications: You should take anti-inflammatory medications such as Motrin, Celebrex for 30 days after surgery, every day, on a regular schedule only if prescribed by your physician. The pain medicine prescribed may be taken as needed. You should take a stool softener (Colace) twice a day, drink lots of water and eat high fiber foods to avoid constipation (this is a common problem with pain medicine.)    · Deep Breathing Exercises: Continue to do your incentive spirometry and/or deep breathing exercises to prevent risk of pneumonia. · Smoking: YOU MAY NOT SMOKE! Smoking will interfere with your healing. If you smoke, you may end up with having another surgery or more problems!     · Activity: No heavy lifting for 4 weeks after surgery. This means anything heavier than a coffee cup or newspaper. After 4 weeks, you may gradually begin lifting heavier things. Avoid bending, stooping, or twisting at the waist.  Do not lie on your stomach to sleep. · You may remove your Edmar hose when consistently walking. You may do steps and inclines in moderation. · Sexual Relations: You may resume sexual relations 2 weeks after your surgery. · Walking Program: You should begin walking every day on the first day after surgery. Start for short distances, then go a little farther each day. You should eventually walk 1-2 miles every day for the long term. This is very important in your recovery period because walking strengthens the spinal muscles and will help protect your disc and vertebrae. · Symptoms after Surgery: Dont be alarmed if you still have some symptoms after surgery. The nerves often require time to heal after the pressure has been taken off. Be patient, you should see improvement with time. · Follow-up: You will need to call our office for an appointment to see a nurse one week after surgery for a wound check. An appointment will be made then for you to see your surgeon about 4 weeks after surgery. Please call our office if you have any other questions or problems. Listen to your body; it will tell you if you are overdoing it. Use common sense and take care of yourself! After general anesthesia or intravenous sedation, for 24 hours or while taking prescription Narcotics:  · Limit your activities  · A responsible adult needs to be with you for the next 24 hours  · Do not drive and operate hazardous machinery  · Do not make important personal or business decisions  · Do not drink alcoholic beverages  · If you have not urinated within 8 hours after discharge, and you are experiencing discomfort from urinary retention, please go to the nearest ED.   · If you have sleep apnea and have a CPAP machine, please use it for all naps and sleeping. · Please use caution when taking narcotics and any of your home medications that may cause drowsiness. *  Please give a list of your current medications to your Primary Care Provider. *  Please update this list whenever your medications are discontinued, doses are      changed, or new medications (including over-the-counter products) are added. *  Please carry medication information at all times in case of emergency situations. These are general instructions for a healthy lifestyle:  No smoking/ No tobacco products/ Avoid exposure to second hand smoke  Surgeon General's Warning:  Quitting smoking now greatly reduces serious risk to your health. Obesity, smoking, and sedentary lifestyle greatly increases your risk for illness  A healthy diet, regular physical exercise & weight monitoring are important for maintaining a healthy lifestyle    You may be retaining fluid if you have a history of heart failure or if you experience any of the following symptoms:  Weight gain of 3 pounds or more overnight or 5 pounds in a week, increased swelling in our hands or feet or shortness of breath while lying flat in bed. Please call your doctor as soon as you notice any of these symptoms; do not wait until your next office visit.

## 2022-10-10 NOTE — PT/OT/SLP PROGRESS
"Occupational Therapy   Treatment    Name: Lavelle Ladd  MRN: [id number]  Admitting Diagnosis:  Acute osteomyelitis of left calcaneus  4 Days Post-Op    Recommendations:     Discharge Recommendations: nursing facility, skilled  Discharge Equipment Recommendations:   (TBD at next level of care)  Barriers to discharge:  None    Assessment:     Lavelle Ladd is a 67 y.o. male with a medical diagnosis of Acute osteomyelitis of left calcaneus.  He presents with the following performance deficits affecting function are weakness, impaired endurance, impaired self care skills, impaired sensation, impaired functional mobility, impaired balance, pain, impaired cardiopulmonary response to activity, decreased safety awareness, impaired skin.     Rehab Prognosis:  Poor; patient would benefit from acute skilled OT services to address these deficits and reach maximum level of function.       Plan:     Patient to be seen 2 x/week to address the above listed problems via self-care/home management, therapeutic activities, therapeutic exercises  Plan of Care Expires: 10/21/22  Plan of Care Reviewed with: patient    Subjective     Pain/Comfort:  Pain Rating 1:  (screaming in pain with all activity. resolves with rest)  Location - Side 1: Left  Location 1: leg  Pain Addressed 1: Pre-medicate for activity, Nurse notified (activity pacing)    Objective:     Communicated with: NurseCrystal, prior to session.  Patient found supine with pulse ox (continuous), telemetry, peripheral IV, blood pressure cuff, grullon catheter, PICC line (rectal tube) upon OT entry to room.    Reported "I CAN TAKE YOU OUT WITH ONE SWING!"    General Precautions: Standard, fall   Orthopedic Precautions: (L BKA)   Braces:  (knee immobilizer not in place)  Respiratory Status: Room air    Bed Mobility:    Patient completed Supine to Sit with total assistance and 2 persons  Patient completed Sit to Supine with total assistance and 2 persons   Attempted to provide " with v/c for technique to maximize personal engagement in movement with poor patient response.    Functional Mobility/Transfers:  Not appropriate for functional transfer at this time    [facility] 6 Click ADL: 8    Treatment & Education:  Patient tolerated intervention poorly. Agitated and combative throughout, threatening therapist. Frequently yelling out in pain despite no physical touch from therapist. Attempted to explain in detail all movements prior to their completion (at patient's request), but patient speaking over therapist. Sat EOB x2 mins prior to forcefully pushing himself back into supine. Patient required max A to maintain static sitting balance with poor anterior weight shift. Overall rehab potential is very limited due to his own poor buy-in. Patient to remain on temporary trial. If participation does not improve, he will be discharged from skilled services.    Patient left right sidelying with all lines intact, call button in reach, bed alarm on, and nurse present    GOALS:   Multidisciplinary Problems       Occupational Therapy Goals          Problem: Occupational Therapy    Goal Priority Disciplines Outcome Interventions   Occupational Therapy Goal     OT, PT/OT Ongoing, Progressing    Description: Goals to be met by: 10/21/22     Patient will increase functional independence with ADLs by performing:    Sitting at edge of bed x20 minutes with Contact Guard Assistance.  Supine to sit with Contact Guard Assistance.  Increased functional strength in B UE grossly by 1/2 MM grade.                         Time Tracking:     OT Date of Treatment: 10/10/22  OT Start Time: 0815  OT Stop Time: 0840  OT Total Time (min): 25 min    Billable Minutes:Therapeutic Activity 25    10/10/2022

## 2022-10-10 NOTE — ASSESSMENT & PLAN NOTE
S/p kidney transplant   tacrolimus continued  Was also on mycophenylate earlier this hospitalization; discontinued 9/21 by renal due to infection.   -Continue to hold mycophenylate for now  Also creatinine up 4.0  Bicarb up today to 14 - NAGMA despite oral Bicarb 1300 TID  Renal on board  No acute RRT indication but may trend toward that; discussed this potential with patient today with improved neuro status, he at first states that he would not want dialysis again but then also states he would not desire hospice. Given continued renal decline, high potential for dialysis indication in near days, Palliative team consulted to help discuss this decision making

## 2022-10-10 NOTE — ASSESSMENT & PLAN NOTE
9/14/22: c/o of left ankle pain-controlled. Pt was scheduled for surgery, however, surgery was post-poned 2/2 hyponatremia -Na 126. WBC normal, afebrile. Blood cultures show NGTD. .3. cont IV Abx  09/15/22- removal of the external fixator and debridement of osteomyelitis planned for today- procedure postponed due to hyponatremia- Lasix given - Nephrology following   9/16/22 - procedure postponed due to hyperglycemia  9/17/22 - post op day 1 - ABX in progress  9/19/22 - post op day 3 - Cefepime continued, Zyvox stopped. NWB. Wound care consulted for wound vac changes  9/20/22 - bone cultures pending. Aerobic culture isolated proteus mirabils  9/21/22 - Proteus mirabilis and B. faecis - Unasyn  9/22/22 - 6 weeks of Rocephin and Flagyl per Dr. Veliz  9/24/22 Vascular surgery was unable to complete the angiogram yesterday d/t refusing it. Will await further recs by Vascular surgery. Continue treatment with IV ABX  9/25/22 Ortho is recommending a BKA per vascular surgery. Awaiting Vascular surgery recs. Continue current management with IV ABX.   9/26/22 The patient is alert and oriented today. He reports that he does not remember refusing the angiogram on 9/23/22. He reports that he is agreeable to the procedure. Vascular Surgery was reconsulted today. Ortho is following. Continue IV ABX. Wound vac is in place.   9/27/22 Vascular surgery reconsulted and plans to do angiogram on 9/29/22 to evaluate for reperfusion vs amputation. Ortho is following.   9/28/22 Vascular surgery plans for a LLE angiogram in AM. NPO after MN. Ortho following   9/29/22 The patients renal function improved with IVF's. Plans for angiogram of LLE today with Vascular surgery to determine if revascularization is possible or if the patient will need a BKA. Will consult PT/OT for recs to assist with placement.   10/1/22 patient febrile ON, septic workup ordered  10/2: BC: NGTD, Afebrile overnight  10/4- BKA planned for 10-6, CM working on  placement following BKA  10/6: Left BKA completed    10/7:    Afebrile, no leukocytosis, status post BKA, received antibiotics for total duration of 25 days- discussed with ID --> considering BKA Dr. Veliz recommended to discontinue antibiotics.     10/8- s/p L BKA    Doing better but has phantom pain

## 2022-10-11 NOTE — PT/OT/SLP PROGRESS
Physical Therapy  Treatment    Lavelle Ladd   MRN: [id number]   Admitting Diagnosis: Acute osteomyelitis of left calcaneus    PT Received On: 10/11/22  PT Start Time: 1001     PT Stop Time: 1035    PT Total Time (min): 34 min       Billable Minutes:  Therapeutic Activity 34    Treatment Type: Treatment  PT/PTA: PTA     PTA Visit Number: 1       General Precautions: Standard, fall  Orthopedic Precautions: N/A   Braces: N/A  Respiratory Status: Room air    Spiritual, Cultural Beliefs, Zoroastrian Practices, Values that Affect Care: no    Subjective:  Communicated with [facility] staff, Chelsea SHELLEY and reviewed Epic chart prior to session.  Patient agreeable to PT.    Pain/Comfort  Pain Rating 1: 4/10  Location - Side 1: Left  Location - Orientation 1: lower  Location 1: leg  Pain Addressed 1: Reposition, Distraction  Pain Rating Post-Intervention 1: 5/10    Objective:   Patient found with: peripheral IV, PICC line, grullon catheter, bowel management system, bed alarm.     Functional Mobility:  Bed Mobility:   Rolling L/R: Mod A with use of bed rails.   Supine <> Sit: Min A   EOB sitting: x 7 minutes    Transfers: Not performed     Gait: N/A        AM-PAC 6 CLICK MOBILITY  How much help from another person does this patient currently need?   1 = Unable, Total/Dependent Assistance  2 = A lot, Maximum/Moderate Assistance  3 = A little, Minimum/Contact Guard/Supervision  4 = None, Modified [place]/Independent    Turning over in bed (including adjusting bedclothes, sheets and blankets)?: 2  Sitting down on and standing up from a chair with arms (e.g., wheelchair, bedside commode, etc.): 1  Moving from lying on back to sitting on the side of the bed?: 3 (increased time for task completion but patient able to complete with maximal cuing and use of bed rails.)  Moving to and from a bed to a chair (including a wheelchair)?: 1  Need to walk in hospital room?: 1  Climbing 3-5 steps with a railing?: 1  Basic Mobility Total Score:  9    AM-PAC Raw Score CMS G-Code Modifier Level of Impairment Assistance   6 % Total / Unable   7 - 9 CM 80 - 100% Maximal Assist   10 - 14 CL 60 - 80% Moderate Assist   15 - 19 CK 40 - 60% Moderate Assist   20 - 22 CJ 20 - 40% Minimal Assist   23 CI 1-20% SBA / CGA   24 CH 0% Independent/ Mod I     Patient left HOB elevated with all lines intact, bed alarm on, and nsg3 notified.    Assessment:  Lavelle Ladd is a 67 y.o. male with a medical diagnosis of Acute osteomyelitis of left calcaneus and presents with overall decline in functional mobility. Patient tolerated therapy session fair this date as he was able to perform bed mobility tasks with less assistance. Therapist educated patient on importance of safety during functional mobility tasks and utilizing the call bell at all times; patient receptive. Will continue to progress toward goals per patient tolerance and PT POC.    Rehab identified problem list/impairments: Rehab identified problem list/impairments: weakness, impaired endurance, impaired cognition, impaired functional mobility, decreased lower extremity function, gait instability, decreased safety awareness, impaired balance, pain    Rehab potential is fair.    Activity tolerance: Fair    Discharge recommendations: Discharge Facility/Level of Care Needs: nursing facility, skilled     Barriers to discharge:      Equipment recommendations: Equipment Needed After Discharge: none     GOALS:   Multidisciplinary Problems       Physical Therapy Goals          Problem: Physical Therapy    Goal Priority Disciplines Outcome Goal Variances Interventions   Physical Therapy Goal     PT, PT/OT Ongoing, Not Progressing     Description: Pt will perform bed mobility independently in order to participate in EOB activity.  Pt will perform transfers SBA with sliding board in order to participate in OOB activity.   Pt will perform w/c mobility with SUP in order to participate in daily tasks.    Pt will tolerate  sitting OOB x 3-4 hrs to participate in daily tasks.                       PLAN:    Patient to be seen 3 x/week  to address the above listed problems via therapeutic activities  Plan of Care expires: 10/21/22  Plan of Care reviewed with: patient         10/11/2022

## 2022-10-11 NOTE — NURSING
"Went to pt room to assess heart monitor. Pt told nurse "do not touch me or I will knock the F out of you." Other staff entered room to assist. Pt swinging at staff, uncooperative, and becoming more combative. Pt reoriented to place, time, and situation. MD notified and orders placed. Pt refusing heart monitor, VS, and blood sugar check.   "

## 2022-10-11 NOTE — NURSING
"Called to pt room by pt friend because pt is attempting to pull out grullon catheter. Attempted to redirect pt and orient to time, place, and situation. Pt still combative and threatening to "call the police". MD notified. Order for soft restraints placed.   "

## 2022-10-11 NOTE — PLAN OF CARE
Nutrition Recommendations 10/11:  1. Recommend diet be advanced to Diabetic/Cardiac diet   2. Recommend chocolate Boost Glucose Control TID   3. Recommend feeding assistance, encourage PO intake   4. When medically appropriate, recommend appeitie stimulant  5. Collaboration of care with medical providers  Sherri Heard Dietitian

## 2022-10-11 NOTE — ASSESSMENT & PLAN NOTE
Creatinine improved  Awaiting angio- completed  Continue tacrolimus- weekly level      10/7:    Noted to have decreased urine output, creatinine trending up, nephrology notified, follow-up on recommendations.      10/8- worsening renal function with decreased UOP and Acidosis- heading towards HD    10/9- persists, BP marginally better, continue to watch, Renal following  Start Solucortef    Overall a little better but cr increased to 4, does not HD at present and does not want HD at present    Cr increased to 4.5- try IV lasix, d/c IVF

## 2022-10-11 NOTE — PROGRESS NOTES
[facility] ([facility])  Nephrology  Progress Note    Patient Name: Lavelle Ladd  MRN: [id number]  Admission Date: 9/13/2022  Hospital Length of Stay: 27 days  Attending Provider: Donal Mcdonald MD   Primary Care Physician: Primary Doctor No  Principal Problem:Acute osteomyelitis of left calcaneus  Reason for Consult: DINORAH in Kidney transplant status   Primary Nephrologist: [facility], last seen by Dr. Estrada 8/2020      Subjective:     HPI:   68 yo male with kidney transplant status last seen by Nephrology during this hospital stay for DINORAH which improved rapidly. Since then pt underwent a L BKA yesterday with subsequent hypotension requiring vasopressor support and expected anemia receiving blood transfusion currently.   Baseline allograft creatinine is around 1.4mg/dL. He has sustained several DINORAH insults in the past two years. Post operative creatinine was 1.7 and has increased to 2.8mg/dL this morning. UOP is decreased.   He denies any complaints currently, his partner is at bedside who assisted in feeding him lunch.     10/8  - delirious today  - poor UOP    10/9  - positive fluid balance, on NC today    10/10  - most lucid since I have seen him and able to have a conversation regarding dialysis. He tells me he does not want dialysis but agrees he does not need dialysis today. He is most concerned with his current phantom pain in his foot    10/11  - no nausea, no SOB  - today he tells me he is willing to proceed with dialysis but is less lucid today   - seen by [facility]     Review of patient's allergies indicates:  No Known Allergies  Current Facility-Administered Medications   Medication Frequency    0.9%  NaCl infusion (for blood administration) Q24H PRN    acetaminophen tablet 650 mg Q4H PRN    albuterol-ipratropium 2.5 mg-0.5 mg/3 mL nebulizer solution 3 mL Q6H PRN    alteplase injection 2 mg Once    aluminum-magnesium hydroxide-simethicone 200-200-20 mg/5 mL suspension 30 mL QID PRN     chlorhexidine 0.12 % solution 10 mL BID    cholestyramine 4 gram packet 4 g BID    dextrose 10% bolus 125 mL PRN    dextrose 10% bolus 250 mL PRN    epoetin dyana-epbx injection 4,880 Units Every Mon, Wed, Fri    famotidine tablet 20 mg Daily    fludrocortisone tablet 100 mcg Daily    glucagon (human recombinant) injection 1 mg PRN    glucose chewable tablet 16 g PRN    glucose chewable tablet 24 g PRN    heparin (porcine) injection 5,000 Units Q12H    HYDROcodone-acetaminophen 5-325 mg per tablet 1 tablet Q4H PRN    hydrocortisone sodium succinate injection 40 mg Q6H    HYDROmorphone injection 1 mg Q4H PRN    HYDROmorphone tablet 2 mg Q3H PRN    influenza (QUADRIVALENT ADJUVANTED PF) vaccine 0.5 mL vaccine x 1 dose    insulin aspart U-100 pen 1-10 Units QID (AC + HS) PRN    insulin detemir U-100 pen 12 Units BID    lactated ringers infusion Continuous    Lactobacillus acidoph-L.bulgar 1 million cell tablet 4 tablet TID WM    melatonin tablet 6 mg Nightly PRN    miconazole nitrate 2% ointment BID    midodrine tablet 10 mg Q8H    morphine injection 3 mg Q1H PRN    naloxone 0.4 mg/mL injection 0.02 mg PRN    ondansetron disintegrating tablet 4 mg Q6H PRN    ondansetron injection 4 mg Q8H    prochlorperazine injection Soln 5 mg Q6H PRN    sertraline tablet 25 mg Daily    simethicone chewable tablet 160 mg TID PRN    sodium bicarbonate tablet 1,300 mg TID    sodium chloride 0.9% flush 10 mL Q8H PRN           Review of Systems   Constitutional:  Negative for fever.   Genitourinary:  Positive for decreased urine volume.   Allergic/Immunologic: Positive for immunocompromised state.   Objective:     Vital Signs (Most Recent):  Temp: 98.6 °F (37 °C) (10/11/22 1114)  Pulse: 71 (10/11/22 1300)  Resp: 17 (10/11/22 1136)  BP: 135/71 (10/11/22 1118)  SpO2: (!) 93 % (10/11/22 1114)  O2 Device (Oxygen Therapy): room air (10/11/22 0938)   Vital Signs (24h Range):  Temp:  [96.7 °F (35.9 °C)-98.6 °F (37 °C)] 98.6 °F (37 °C)  Pulse:   [67-72] 71  Resp:  [16-20] 17  SpO2:  [93 %-98 %] 93 %  BP: (121-196)/(62-88) 135/71     Weight: 85.3 kg (188 lb 0.8 oz) (10/08/22 0627)  Body mass index is 26.23 kg/m².  Body surface area is 2.07 meters squared.    I/O last 3 completed shifts:  In: 290 [P.O.:200; I.V.:40; NG/GT:50]  Out: 550 [Urine:400; Stool:150]    Physical Exam  Vitals and nursing note reviewed.   Constitutional:       Appearance: He is ill-appearing.      Comments:      Cardiovascular:      Rate and Rhythm: Normal rate.      Heart sounds:     No friction rub.   Pulmonary:      Effort: Pulmonary effort is normal. No respiratory distress.      Breath sounds: No wheezing.   Neurological:      Mental Status: He is alert.       Significant Labs:  reviewed    Assessment/Plan:     Active Diagnoses:    Diagnosis Date Noted POA    PRINCIPAL PROBLEM:  Acute osteomyelitis of left calcaneus [M86.172] 2022 Yes    Palliative care encounter [Z51.5] 10/10/2022 Not Applicable    Encephalopathy, metabolic [G93.41] 10/09/2022 No    Hypotension [I95.9] 10/06/2022 No    S/P BKA (below knee amputation), left [Z89.512] 10/06/2022 Not Applicable    Peripheral artery disease [I73.9] 2022 Yes    MARIA INES (obstructive sleep apnea) [G47.33] 2020 Yes    Old MI (myocardial infarction) [I25.2] 2019 Not Applicable    Stage 3 chronic kidney disease, DINORAH on CKD 3 [N18.30]  Yes    Long term current use of immunosuppressive drug [Z79.899] 2018 Not Applicable    Chronic obstructive pulmonary disease [J44.9] 2016 Yes    Coronary artery disease of native artery of native heart with stable angina pectoris [I25.118] 2016 Yes    Type 2 diabetes mellitus with hyperglycemia, with long-term current use of insulin [E11.65, Z79.4]  Not Applicable     donor kidney transplant for DM 16, DINORAH on CKD [Z94.0]  Not Applicable     Chronic    Essential hypertension [I10] 2015 Yes      Problems Resolved During this Admission:    Diagnosis Date  Noted Date Resolved POA    Infection of deep incisional surgical site after procedure [T81.42XA] 09/15/2022 10/10/2022 Yes    Hyponatremia [E87.1] 08/30/2022 09/22/2022 Yes    H/O amputation [Z89.9] 12/01/2016 10/05/2022 Yes       DINORAH on CKD kidney transplant status, baseline creatinine around 1.4mg/dL  - second DINORAH episode since admission  - delta creatinine improved yesterday, similar rise today  - more likely ATN as etiology since improved hemodynamics did not improve kidney function  - no acute indications for dialysis today, would like to avoid HD if possible as uncertain if pt truly agreeable     Volume Status  - appears less overloaded today but back on IVFs  - will redose Lasix, recommend hold IVFs as recent volume overload     Metabolic acidosis, improved/stable  - very common post txp, worsened with DINORAH X2  - continue po bicarb    Anemia  - H&H stable    Kidney transplant status  - goal trough 4-7, last level of 9.9 not true trough, repeat 9.8. Hold Prograf this evening and reduce dose to 0.5/1 from 1/1  - continue to hold MMF     Delirium   - multifactorial  - off gabapentin  - off Bentyl    Addendum 18:08  Updated sister Kecia by phone that kidney function continues to deteriorate. Mr. Ladd is unable to make decisions currently. Will discuss further tomorrow with team     Gianni Masterson MD  Nephrology

## 2022-10-11 NOTE — ASSESSMENT & PLAN NOTE
Hypotensive following surgery, treated with IV fluid bolus with no improvement, placed on Levophed drip, transferred to ICU    --Vitals per Unit protocol  --Continue Levophed at this time  --Hold HTN medications  --Consult Critical Care    10/7:    Status post BKA as of 10/06/2022- became hypotensive and has been transferred to ICU on Levophed.  Currently on Levophed.    Hemoglobin dropped down to 7.7, 1 unit of PRBC ordered.  Follow up on repeat labs.    After transfusion, IVF--> if BP stabilizes possible downgrade to floor;     10/8- still hypotensive on Levophed, was on Florinef, added Midodrine    10/9- BP slightly better, continue present care,    Starting Solucortef    10/10- improved, off Levophed with steroids    Resolved with steroids  Start lowering steroids

## 2022-10-11 NOTE — PLAN OF CARE
"[facility] - Med Surg  Discharge Reassessment    Primary Care Provider: Primary Doctor No    Expected Discharge Date:     Reassessment (most recent)       Discharge Reassessment - 10/11/22 9081          Discharge Reassessment    Assessment Type Discharge Planning Reassessment     Did the patient's condition or plan change since previous assessment? No     Discharge Plan discussed with: Patient;Sibling     Name(s) and Number(s) Kecia     Communicated LISETH with patient/caregiver Date not available/Unable to determine     Discharge Plan A Skilled Nursing Facility     Discharge Plan B Skilled Nursing Facility     DME Needed Upon Discharge  none     Why the patient remains in the hospital Requires continued medical care        Post-Acute Status    Post-Acute Authorization Placement     Post-Acute Placement Status Pending medical clearance/testing                   CM met with patient at the bedside to discuss the discharge plan.  Patient was not able to participate in a conversation in regards to the plan.  Patient was not able to state that he was in the hospital.  He stated "I need to go to my room", CM reoriented him to room information.  CM asked patient who Kecia was (sister), however patient's response was "what do you need to know that for?".  CM explained again about discharge planning.  Patient was becoming agitated so CM ended conversation and contacted Kecia.  Patient came from [facility] and there was initial concern about the wound care he received there, however patient required and amputation.  Kecia would like [facility] to be first choice for SNF when patient is ready to discharge.  Kecia stated that patient was at [facility] in the past and she did not want patient to return to that facility.  CM will continue to follow for placement needs.      "

## 2022-10-11 NOTE — PLAN OF CARE
Pt remains free from falls/injuries this shift. Safety precautions maintained. Pain managed with pain medication. No s/s of acute distress noted. VSS. Tele monitoring per orders. PICC dressing CDI. Woody in place for required immobilization. Bilateral BKA dressings CDI. Will continue to monitor. Chart check completed.

## 2022-10-11 NOTE — ASSESSMENT & PLAN NOTE
Confused, disoriented since BKA and also hypotensive, acidotic, worsening renal failure, so transferred to ICU and placed on Levophed gtt  No significant improvement so far  Started on Steroids    Better, more lucid now    Waxes and wanes- more delirious today  Had tele Psych eval this am

## 2022-10-11 NOTE — PROGRESS NOTES
[facility] - [facility] (H[facility])  Adult Nutrition  Progress Note    SUMMARY       Recommendations    Recommendation/Intervention:   1. Recommend diet be advanced to Diabetic/Cardiac diet   2. Recommend chocolate Boost Glucose Control TID   3. Recommend feeding assistance, encourage PO intake   4. When medically appropriate, recommend appeitie stimulant    Goals:   1. Pt will consume 75% PO intake by RD follow up   2. Pt will consume 50% supplements by RD follow up  Nutrition Goal Status: continues   Communication of RD Recs: other (comment) (Plan of care: Sticky note)    Assessment and Plan    Nutrition Problem:  Inadequate protein-energy intake    Related to (etiology):  Increased demand for nutrition    Signs and Symptoms (as evidenced by):  0-25% PO intake x >5 days, BKA surgery      Intervention/Recommendations (treatment strategy):  1. Recommend diet be advanced to Diabetic/Cardiac diet   2. Recommend chocolate Boost Glucose Control TID   3. Recommend feeding assistance, encourage PO intake   4. When medically appropriate, recommend appeitie stimulant  5. Collaboration of care with medical providers    Nutrition Diagnosis status:  Continues      Reason for Assessment    Reason For Assessment: RD follow-up  Diagnosis: other (see comments) (Acute osteomyelitis of left calcaneus)  Relevant Medical History: HTN, DMT2, CAD, COPD, CKD3  Interdisciplinary Rounds: did not attend  General Information Comments: Spoke to RN, stated pt irritable, with poor appetite/refusing to eat, possible FTT d/t additional (new) amputation. RN reported pt ate 25% for dinner, refusing breakfast, pt will sip on Boost and Arginaid and some PO intake when encouraged. Noted wt loss d/t amputation. LBM 10/6. RD will continue to monitor for PO intake.  Nutrition Discharge Planning: Diabetic/cardiac diet    Follow up  10/11: Visited pt at bedside, pt confirmed recently transferred from the ICU d/t BKA, does not have a big appetite,stated has  "not been receiving any boosts glucose control, receptive to try to use to fill nutritional gaps, prefers chocolate, pt stated concern that he does not have anyone to cook for him, sister tries to help when she can. Pt not experiencing any N/V but stated has had diarrhea, per chart x 3 days. Pt was tired and requested to go to sleep. LBM 10/10. Labs, meds, weight reviewed. BUN (H), Cr (H), Glu (H), Na (L), Calcium (L). AST (L), ALT (L), GFR 13. RD will continue to monitor.     Nutrition Risk Screen    Nutrition Risk Screen: no indicators present    Nutrition/Diet History    Spiritual, Cultural Beliefs, Hoahaoism Practices, Values that Affect Care: no  Food Allergies: NKFA  Factors Affecting Nutritional Intake: decreased appetite, depression    Anthropometrics    Temp: 98.6 °F (37 °C)  Height Method: Stated  Height: 5' 11" (180.3 cm)  Height (inches): 71 in  Weight Method: Bed Scale  Weight: 85.3 kg (188 lb 0.8 oz)  Weight (lb): 188.05 lb  Ideal Body Weight (IBW), Male: 172 lb  % Ideal Body Weight, Male (lb): 110.49 %  BMI (Calculated): 26.2  BMI Grade: 25 - 29.9 - overweight  Amputation %: 5.9, 5.9  Total Amputation %: 11.8  Amputation Ideal Body Weight (IBW), Male (lb): 160.2 lb  Amputee BMI (kg/m2): 30.13 kg/m2  Additional Documentation: Amputations Present (Ideal Body Weight)    Lab/Procedures/Meds    Pertinent Labs Reviewed: reviewed  Pertinent Labs Comments: Na 128, Cr 1.6, GFR 46, Glu 266, Mg 1.4, Alb 1.8, , ALT 94, A1c 7.4% on 8/4/22  Pertinent Medications Reviewed: reviewed  Pertinent Medications Comments: atorvastatin, carvedilol, furosemide, gabapentin, insulin, magnesium oxide, tacrolimus  BMP  Lab Results   Component Value Date     (L) 10/11/2022     (L) 10/11/2022    K 4.5 10/11/2022    K 4.6 10/11/2022     10/11/2022     10/11/2022    CO2 16 (L) 10/11/2022    CO2 16 (L) 10/11/2022    BUN 35 (H) 10/11/2022    BUN 35 (H) 10/11/2022    CREATININE 4.5 (H) 10/11/2022    " CREATININE 4.5 (H) 10/11/2022    CALCIUM 8.6 (L) 10/11/2022    CALCIUM 8.3 (L) 10/11/2022    ANIONGAP 11 10/11/2022    ANIONGAP 12 10/11/2022    ESTGFRAFRICA 47 (A) 08/15/2021    EGFRNONAA 41 (A) 08/15/2021      Recent Labs   Lab 10/11/22  1110   POCTGLUCOSE 335*     Lab Results   Component Value Date    ALT <5 (L) 10/11/2022    AST 5 (L) 10/11/2022    ALKPHOS 96 10/11/2022    BILITOT 0.3 10/11/2022         Scheduled Meds:   alteplase  2 mg Intra-Catheter Once    chlorhexidine  10 mL Mouth/Throat BID    cholestyramine  1 packet Oral BID    epoetin dyana-epbx  50 Units/kg Subcutaneous Every Mon, Wed, Fri    famotidine  20 mg Oral Daily    fludrocortisone  100 mcg Oral Daily    heparin (porcine)  5,000 Units Subcutaneous Q12H    hydrocortisone sodium succinate  40 mg Intravenous Q6H    insulin detemir U-100  12 Units Subcutaneous BID    Lactobacillus acidoph-L.bulgar  4 tablet Oral TID WM    miconazole nitrate 2%   Topical (Top) BID    midodrine  10 mg Oral Q8H    ondansetron  4 mg Intravenous Q8H    sertraline  25 mg Oral Daily    sodium bicarbonate  1,300 mg Oral TID     Continuous Infusions:   lactated ringers 125 mL/hr at 10/11/22 1130     PRN Meds:.sodium chloride, acetaminophen, albuterol-ipratropium, aluminum-magnesium hydroxide-simethicone, dextrose 10%, dextrose 10%, glucagon (human recombinant), glucose, glucose, HYDROcodone-acetaminophen, HYDROmorphone, HYDROmorphone, influenza, insulin aspart U-100, melatonin, morphine, naloxone, ondansetron, prochlorperazine, simethicone, sodium chloride 0.9%   Physical Findings/Assessment    Wt Readings from Last 15 Encounters:   10/08/22 85.3 kg (188 lb 0.8 oz)   09/02/22 102 kg (224 lb 13.9 oz)   08/08/22 114.7 kg (252 lb 13.9 oz)   08/15/21 102.5 kg (226 lb)   02/11/21 101.6 kg (224 lb)   10/07/20 101.6 kg (224 lb)   08/12/20 101.6 kg (224 lb)   07/08/20 104.3 kg (230 lb)   07/07/20 104.3 kg (230 lb)   07/06/20 108.1 kg (238 lb 6.4 oz)   06/30/20 104.5 kg (230 lb 6.1  oz)   06/24/20 105.4 kg (232 lb 5.8 oz)   03/04/20 104.6 kg (230 lb 9.6 oz)   02/21/20 103.6 kg (228 lb 6.3 oz)   02/14/20 100.7 kg (222 lb)   ]    Estimated/Assessed Needs    Weight Used For Calorie Calculations: 72.8 kg (160 lb 7.9 oz) (Adj for both BKA)  Energy Calorie Requirements (kcal): 1818 (MSJ x 1.2 AF)  Energy Need Method: [facility]  Protein Requirements: 72-91 (1-1.25g/kg (Adj for age, amputations))  Weight Used For Protein Calculations: 72.8 kg (160 lb 7.9 oz)  Fluid Requirements (mL): 1818 (ml/kcal)  Estimated Fluid Requirement Method: RDA Method  RDA Method (mL): 1818  CHO Requirement: 227      Nutrition Prescription Ordered    Current Diet Order: Diabetic/Cardiac    Evaluation of Received Nutrient/Fluid Intake    % Kcal Needs: 0  % Protein Needs: 0  I/O: 0.5 L  Energy Calories Required: not meeting needs  Protein Required: not meeting needs  Fluid Required: meeting needs  Comments: LBM 9/15  % Intake of Estimated Energy Needs: 0 - 25 %  % Meal Intake: 0 - 25 %    Nutrition Risk    Level of Risk/Frequency of Follow-up: high     Monitor and Evaluation    Food and Nutrient Intake: food and beverage intake  Food and Nutrient Adminstration: diet order  Knowledge/Beliefs/Attitudes: food and nutrition knowledge/skill, beliefs and attitudes  Physical Activity and Function: nutrition-related ADLs and IADLs  Anthropometric Measurements: weight, weight change, body mass index  Biochemical Data, Medical Tests and Procedures: electrolyte and renal panel, lipid profile, gastrointestinal profile, glucose/endocrine profile, inflammatory profile  Nutrition-Focused Physical Findings: overall appearance     Nutrition Follow-Up    RD Follow-up?: Yes  Suraj Padillaitibing

## 2022-10-11 NOTE — SUBJECTIVE & OBJECTIVE
Interval History: mostly delirious again, Bun/Cr worsening despite IVF- hence IVF d/adela and pt given IV lasix 100 mg by Dr. Masterson. HD not yet indicated but unsure if pt wants HD. Had psych eval this- non specific Delirium. Steroids reduced. Bun/cr 35/4.5, CO2 16. Prognosis guarded to poor. Will d/w pt and his sister again about HD vs comfort care again.     Review of Systems   Unable to perform ROS: Mental status change   Objective:     Vital Signs (Most Recent):  Temp: 98.6 °F (37 °C) (10/11/22 1114)  Pulse: 70 (10/11/22 1500)  Resp: 17 (10/11/22 1136)  BP: 135/71 (10/11/22 1118)  SpO2: (!) 93 % (10/11/22 1114)   Vital Signs (24h Range):  Temp:  [97.6 °F (36.4 °C)-98.6 °F (37 °C)] 98.6 °F (37 °C)  Pulse:  [67-72] 70  Resp:  [16-20] 17  SpO2:  [93 %-98 %] 93 %  BP: (121-196)/(62-88) 135/71     Weight: 85.3 kg (188 lb 0.8 oz)  Body mass index is 26.23 kg/m².    Intake/Output Summary (Last 24 hours) at 10/11/2022 1826  Last data filed at 10/11/2022 1130  Gross per 24 hour   Intake 353.98 ml   Output 250 ml   Net 103.98 ml      Physical Exam  Vitals and nursing note reviewed.   Constitutional:       General: He is awake.      Appearance: He is overweight. He is ill-appearing. He is not toxic-appearing.   HENT:      Head: Atraumatic.   Eyes:      Conjunctiva/sclera: Conjunctivae normal.   Neck:      Vascular: No JVD.   Cardiovascular:      Rate and Rhythm: Normal rate and regular rhythm.      Pulses:           Radial pulses are 2+ on the right side and 2+ on the left side.   Pulmonary:      Effort: Pulmonary effort is normal.      Breath sounds: Decreased breath sounds and rhonchi present. No wheezing.   Abdominal:      General: There is no distension.      Palpations: Abdomen is soft.   Musculoskeletal:      Right lower leg: No edema.      Left lower leg: No edema.   Skin:     General: Skin is warm and dry.      Capillary Refill: Capillary refill takes 2 to 3 seconds.          Neurological:      Mental Status: He is  alert.      GCS: GCS eye subscore is 4. GCS verbal subscore is 5. GCS motor subscore is 6.   Psychiatric:         Attention and Perception: He is attentive.         Mood and Affect: Affect is blunt.         Speech: Speech normal.         Behavior: Behavior is not agitated or aggressive. Behavior is cooperative.         Judgment: Judgment is impulsive.       Significant Labs: All pertinent labs within the past 24 hours have been reviewed.  BMP:   Recent Labs   Lab 10/11/22  0604   *  271*   *  131*   K 4.6  4.5     104   CO2 16*  16*   BUN 35*  35*   CREATININE 4.5*  4.5*   CALCIUM 8.3*  8.6*   MG 2.3  2.3     CBC:   Recent Labs   Lab 10/10/22  0430 10/11/22  0604 10/11/22  0759   WBC 7.04 6.13 5.10   HGB 8.7* 9.7* 9.4*   HCT 29.1* 31.7* 30.6*    324 309     CMP:   Recent Labs   Lab 10/10/22  0430 10/11/22  0604   * 132*  131*   K 4.6 4.6  4.5    104  104   CO2 14* 16*  16*   * 273*  271*   BUN 29* 35*  35*   CREATININE 4.0* 4.5*  4.5*   CALCIUM 8.3* 8.3*  8.6*   PROT  --  5.3*   ALBUMIN  --  2.1*   BILITOT  --  0.3   ALKPHOS  --  96   AST  --  5*   ALT  --  <5*   ANIONGAP 12 12  11       Significant Imaging: I have reviewed all pertinent imaging results/findings within the past 24 hours.
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
## 2022-10-12 PROBLEM — I73.9 PERIPHERAL ARTERY DISEASE: Status: RESOLVED | Noted: 2022-01-01 | Resolved: 2022-01-01

## 2022-10-12 PROBLEM — I95.9 HYPOTENSION: Status: RESOLVED | Noted: 2022-01-01 | Resolved: 2022-01-01

## 2022-10-12 PROBLEM — Z89.512 S/P BKA (BELOW KNEE AMPUTATION), LEFT: Status: RESOLVED | Noted: 2022-01-01 | Resolved: 2022-01-01

## 2022-10-12 NOTE — PLAN OF CARE
Problem: Adult Inpatient Plan of Care  Goal: Plan of Care Review  Outcome: Ongoing, Progressing  Goal: Patient-Specific Goal (Individualized)  Outcome: Ongoing, Progressing  Goal: Absence of Hospital-Acquired Illness or Injury  Outcome: Ongoing, Progressing  Goal: Optimal Comfort and Wellbeing  Outcome: Ongoing, Progressing  Goal: Readiness for Transition of Care  Outcome: Ongoing, Progressing     Problem: Infection  Goal: Absence of Infection Signs and Symptoms  Outcome: Ongoing, Progressing     Problem: Infection (Pneumonia)  Goal: Resolution of Infection Signs and Symptoms  Outcome: Ongoing, Progressing     Problem: Diabetes Comorbidity  Goal: Blood Glucose Level Within Targeted Range  Outcome: Ongoing, Progressing     Problem: Skin Injury Risk Increased  Goal: Skin Health and Integrity  Outcome: Ongoing, Progressing     Problem: Impaired Wound Healing  Goal: Optimal Wound Healing  Outcome: Ongoing, Progressing     Problem: Fall Injury Risk  Goal: Absence of Fall and Fall-Related Injury  Outcome: Ongoing, Progressing     Problem: Restraint, Nonbehavioral (Nonviolent)  Goal: Absence of Harm or Injury  Outcome: Ongoing, Not Progressing

## 2022-10-12 NOTE — ASSESSMENT & PLAN NOTE
S/p kidney transplant   -medications - mycophenolate continued    Prograf 9.9- continue    Good response to stress dose steroids  Solucortef lowered to 40 qid    switch to prednisone now

## 2022-10-12 NOTE — SUBJECTIVE & OBJECTIVE
Interval History: seen and examined with Dr. Masterson- great response to IV Lasix yesterday as well as with grullon beny, he put out 1 L of urine. Today he is lucid, no confusion, AAOx4, answering all questions appropriately, denies any pain LLE, he ate BF and lunch. He categorically refused/declined HD again if his kidney function deteriorates further, Bun/Cr 41/4.4 today, HCO3 18, H/H 8.9/29.     Review of Systems   Constitutional:  Positive for activity change and appetite change. Negative for chills, diaphoresis and fatigue.   HENT: Negative.     Eyes: Negative.    Respiratory: Negative.  Negative for cough and shortness of breath.    Cardiovascular: Negative.  Negative for chest pain and leg swelling.   Gastrointestinal: Negative.    Endocrine: Negative.    Genitourinary: Negative.    Musculoskeletal:  Negative for gait problem.   Skin: Negative.    Neurological:  Positive for weakness.   Hematological: Negative.    Psychiatric/Behavioral: Negative.     Objective:     Vital Signs (Most Recent):  Temp: 98 °F (36.7 °C) (10/12/22 0815)  Pulse: (!) 58 (10/12/22 0815)  Resp: 16 (10/12/22 1007)  BP: (!) 126/58 (10/12/22 0815)  SpO2: 97 % (10/12/22 0815)   Vital Signs (24h Range):  Temp:  [97.6 °F (36.4 °C)-98.2 °F (36.8 °C)] 98 °F (36.7 °C)  Pulse:  [58-71] 58  Resp:  [16-18] 16  SpO2:  [94 %-97 %] 97 %  BP: (126-166)/(58-74) 126/58     Weight: 92.2 kg (203 lb 4.2 oz)  Body mass index is 28.35 kg/m².    Intake/Output Summary (Last 24 hours) at 10/12/2022 1258  Last data filed at 10/12/2022 0914  Gross per 24 hour   Intake 180 ml   Output 1000 ml   Net -820 ml      Physical Exam  Vitals and nursing note reviewed.   Constitutional:       General: He is awake.      Appearance: He is overweight. He is ill-appearing. He is not toxic-appearing.   HENT:      Head: Normocephalic and atraumatic.      Mouth/Throat:      Mouth: Mucous membranes are moist.      Pharynx: Oropharynx is clear.   Eyes:      General: No scleral  icterus.     Extraocular Movements: Extraocular movements intact.      Conjunctiva/sclera: Conjunctivae normal.      Pupils: Pupils are equal, round, and reactive to light.   Neck:      Vascular: No JVD.   Cardiovascular:      Rate and Rhythm: Normal rate and regular rhythm.      Pulses:           Radial pulses are 2+ on the right side and 2+ on the left side.   Pulmonary:      Effort: Pulmonary effort is normal.      Breath sounds: Decreased breath sounds and rhonchi present. No wheezing.   Abdominal:      General: Abdomen is flat. Bowel sounds are normal. There is no distension.      Palpations: Abdomen is soft.   Musculoskeletal:      Cervical back: Normal range of motion and neck supple. No rigidity.      Right lower leg: No edema.      Left lower leg: No edema.   Lymphadenopathy:      Cervical: No cervical adenopathy.   Skin:     General: Skin is warm and dry.      Capillary Refill: Capillary refill takes 2 to 3 seconds.      Coloration: Skin is not jaundiced.      Findings: No bruising.          Neurological:      General: No focal deficit present.      Mental Status: He is alert and oriented to person, place, and time.      GCS: GCS eye subscore is 4. GCS verbal subscore is 5. GCS motor subscore is 6.      Motor: Weakness present.      Gait: Gait abnormal.   Psychiatric:         Attention and Perception: He is attentive.         Mood and Affect: Mood normal. Affect is blunt.         Speech: Speech normal.         Behavior: Behavior normal. Behavior is not agitated or aggressive. Behavior is cooperative.         Judgment: Judgment is impulsive.       Significant Labs: All pertinent labs within the past 24 hours have been reviewed.  BMP:   Recent Labs   Lab 10/12/22  0538   *   *   K 3.6      CO2 18*   BUN 41*   CREATININE 4.4*   CALCIUM 8.0*   MG 2.2     CBC:   Recent Labs   Lab 10/11/22  0604 10/11/22  0759 10/12/22  0538   WBC 6.13 5.10 3.75*   HGB 9.7* 9.4* 8.9*   HCT 31.7* 30.6* 28.2*     309 248     CMP:   Recent Labs   Lab 10/11/22  0604 10/12/22  0538   *  131* 134*   K 4.6  4.5 3.6     104 105   CO2 16*  16* 18*   *  271* 211*   BUN 35*  35* 41*   CREATININE 4.5*  4.5* 4.4*   CALCIUM 8.3*  8.6* 8.0*   PROT 5.3*  --    ALBUMIN 2.1*  --    BILITOT 0.3  --    ALKPHOS 96  --    AST 5*  --    ALT <5*  --    ANIONGAP 12  11 11     .mj  Significant Imaging: I have reviewed all pertinent imaging results/findings within the past 24 hours.

## 2022-10-12 NOTE — PLAN OF CARE
Pt is stable on room air. Pt c/o LLE phantom pain. Pt pain has been managed well with ordered pain meds. Pt refused tele monitor all day. Pt voided 2x since grullon was pulled at 0600. Pt taken off of restraints this morning around 0900 per  order. Pt has been calm, cooperative, and behaving appropriately for the situation. Wound care was done for bilateral buttock wound. Rectal tube still in place. Pt is A&Ox4 and has call light in reach. All safety precautions maintained.

## 2022-10-12 NOTE — ASSESSMENT & PLAN NOTE
Confused, disoriented since BKA and also hypotensive, acidotic, worsening renal failure, so transferred to ICU and placed on Levophed gtt  No significant improvement so far  Started on Steroids    Better, more lucid now    Waxes and wanes- more delirious today  Had tele Psych eval this am    Much better, AAOx4, speech clear, following appropriately

## 2022-10-12 NOTE — PT/OT/SLP PROGRESS
"Physical Therapy  Treatment    Lavelle Ladd   MRN: [id number]   Admitting Diagnosis: Acute osteomyelitis of left calcaneus    PT Received On: 10/12/22  PT Start Time: 1506     PT Stop Time: 1520    PT Total Time (min): 14 min       Billable Minutes:  Therapeutic Exercise 14    Treatment Type: Treatment  PT/PTA: PTA     PTA Visit Number: 2       General Precautions: Standard, fall  Orthopedic Precautions: N/A   Braces: N/A  Respiratory Status: Room air    Spiritual, Cultural Beliefs, Anabaptist Practices, Values that Affect Care: no    Subjective:  [facility] [facility], [place] and reviewed Epic chart prior to session.  Patient has a request for pain medication d/t pain experienced. Agreeable to PT.    Pain/Comfort  Pain Rating 1: 5/10  Location - Side 1: Left  Location - Orientation 1: lower  Location 1: leg  Pain Addressed 1: Reposition, Distraction (activity pacing)  Pain Rating Post-Intervention 1: 5/10    Objective:   Patient found with: PICC line, bowel management system, bed alarm, grullon catheter while positioned in semi-wells's in bed, unaccompanied.     Functional Mobility:  Bed Mobility:    Rolling left and right: Mod A provided to assist c glute clearance  Supine<>sit: Patient declined EOB activity d/t pain experienced    Transfers: N/A    Gait: N/A      Therapeutic Activities and Exercises:  Patient performed therapeutic exercises (Hip ABD, SLR, GS) for 3 x10 reps to improve ROM and strengthening of BLEs.    Supine in lying x 5 minutes with slight overpressure to pelvic region to provide stretch to hip/trunk flexors.  Pt educated on the following: Pt verbally agreed in understanding.   Continue therapuetic exercises throughout the day to increase activity tolerance  "Call, don't fall" procedure of pressing red button on call bell for all transfers.      AM-PAC 6 CLICK MOBILITY  How much help from another person does this patient currently need?   1 = Unable, Total/Dependent Assistance  2 = A lot, " Maximum/Moderate Assistance  3 = A little, Minimum/Contact Guard/Supervision  4 = None, Modified [place]/Independent    Turning over in bed (including adjusting bedclothes, sheets and blankets)?: 2  Sitting down on and standing up from a chair with arms (e.g., wheelchair, bedside commode, etc.): 1  Moving from lying on back to sitting on the side of the bed?: 3  Moving to and from a bed to a chair (including a wheelchair)?: 1  Need to walk in hospital room?: 1  Climbing 3-5 steps with a railing?: 1  Basic Mobility Total Score: 9    AM-PAC Raw Score CMS G-Code Modifier Level of Impairment Assistance   6 % Total / Unable   7 - 9 CM 80 - 100% Maximal Assist   10 - 14 CL 60 - 80% Moderate Assist   15 - 19 CK 40 - 60% Moderate Assist   20 - 22 CJ 20 - 40% Minimal Assist   23 CI 1-20% SBA / CGA   24 CH 0% Independent/ Mod I     Patient left HOB elevated with all lines intact, call button in reach, bed alarm on, and nsg notified.    Assessment:  Lavelle Ladd is a 67 y.o. male with a medical diagnosis of Acute osteomyelitis of left calcaneus and presents with overall decline in functional mobility. Patient tolerated therapy session fair this date as he was able to perform therapeutic exercises to improve ROM and strengthening of BLEs. Patient appears to be in a good mood today and more optimistic regarding his diagnosis. Will continue to progress toward goals per patient tolerance and PT POC.    Rehab identified problem list/impairments: Rehab identified problem list/impairments: weakness, impaired endurance, impaired cognition, impaired self care skills, impaired functional mobility, decreased lower extremity function, decreased safety awareness, gait instability, impaired balance    Rehab potential is fair.    Activity tolerance: Fair    Discharge recommendations: Discharge Facility/Level of Care Needs: nursing facility, skilled     Barriers to discharge:      Equipment recommendations: Equipment Needed  After Discharge: none     GOALS:   Multidisciplinary Problems       Physical Therapy Goals          Problem: Physical Therapy    Goal Priority Disciplines Outcome Goal Variances Interventions   Physical Therapy Goal     PT, PT/OT Ongoing, Not Progressing     Description: Pt will perform bed mobility independently in order to participate in EOB activity.  Pt will perform transfers SBA with sliding board in order to participate in OOB activity.   Pt will perform w/c mobility with SUP in order to participate in daily tasks.    Pt will tolerate sitting OOB x 3-4 hrs to participate in daily tasks.                       PLAN:    Patient to be seen 3 x/week  to address the above listed problems via therapeutic exercises  Plan of Care expires: 10/21/22  Plan of Care reviewed with: patient         10/12/2022

## 2022-10-12 NOTE — NURSING
Patient refused tele monitor all night.  [facility] PICC line dressing was changed on 10/12. Patient has rectal tube. Woody pulled and patient urinated 1000mL during the night. Patient is in non violent restraints and order will  at 1735 tonight. Patient has a wound to bilateral buttocks and wound care was consulted. Patient was confused during the night and began to hallucinate. He stated there was bugs flying all over the room. However, this morning the patient was AAX4. I notified Ashley Tavarez NP about hallucinations. Patient Left surgical dressing is CDI and was cleaned at 6 AM this morning. All safety precautions are in place.

## 2022-10-12 NOTE — PROGRESS NOTES
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 disposition  Discharge Plan A: Skilled Nursing Facility   Discharge Delays: (!) Patient and Family Barriers      Faraz Ng MD  Department of Hospital Medicine   [facility] - Med Surg

## 2022-10-12 NOTE — ASSESSMENT & PLAN NOTE
Creatinine improved  Awaiting angio- completed  Continue tacrolimus- weekly level      10/7:    Noted to have decreased urine output, creatinine trending up, nephrology notified, follow-up on recommendations.      10/8- worsening renal function with decreased UOP and Acidosis- heading towards HD    10/9- persists, BP marginally better, continue to watch, Renal following  Start Solucortef    Overall a little better but cr increased to 4, does not HD at present and does not want HD at present    Cr increased to 4.5- try IV lasix, d/c IVF    10/12- good response to lasix, good urine output, Cr 4.4  Pt does not want any HD

## 2022-10-12 NOTE — PROGRESS NOTES
Progress Note   Palliative Medicine      SUBJECTIVE:     History of Present Illness:  Patient seen and examined without family present. I helped him reposition in bed and he reported adequate pain control. I understand that he refused dialysis if needed today however he says he does not remember refusing. He adds that he should be given more time to think about this decision.      Past Medical History:   Diagnosis Date    DINORAH (acute kidney injury) 2016    Arthritis     CAD in native artery 2019    CHF (congestive heart failure)     Chronic obstructive pulmonary disease 2016    Coronary artery disease involving native coronary artery of native heart without angina pectoris 2016    CRI (chronic renal insufficiency) 2019     donor kidney transplant for DM 16     Induction with Thymo x3 and IV solumedrol to total 875mg  Kidney Biopsy  2016: 16 glomeruli, ACR type 1 AVR type 2, significant microcirculatory changes, c4d negative, No DSA, 5 to10% fibrosis. Treated with thymo x8 2016- no rejection      Diastolic heart failure     Encounter for blood transfusion     ESRD on RRT since 10/2013 10/29/2013    Biopsy proven diabetic nephropathy and lymphoplasmacytic interstitial infiltrate not c/w with AIN (ddx sjogrens or assoc with tamm-horsefall protein extravasation)     GERD (gastroesophageal reflux disease)     History of hepatitis C, s/p successful Rx w/ SVR - 2017    Completed 12 weeks harvoni w/ SVR    Hyperlipidemia     Hypertension     PAD (peripheral artery disease) 2019    PIC line (peripherally inserted central catheter) flush     Prophylactic immunotherapy     Proteinuria     PVD (peripheral vascular disease) 2017    RLE BKA CT 16 Extensive atherosclerotic disease of the aorta and mesenteric arteries.     Renal hypertension     Type 2 diabetes mellitus with diabetic neuropathy, with long-term current use of insulin 2016    Vitamin B12  deficiency        Review of patient's allergies indicates:  No Known Allergies    Review of Symptoms      Symptom Assessment (ESAS 0-10 Scale)  Pain:  0  Dyspnea:  0  Anxiety:  0  Nausea:  0  Depression:  0  Anorexia:  0  Fatigue:  0  Insomnia:  0  Restlessness:  0  Agitation:  0       Constipation:  No constipation    Living Arrangements:  Lives alone    Advance Care Planning   Advance Directives:   Do Not Resuscitate Status: Yes    Medical Power of : No        Oral Declaration: Yes   Witnesses:  Darline Barnhart and Norma Thompson   Agent's Name:  Sister Kecia Sagastume    Decision Making:  Patient answered questions  Goals of Care: What is most important right now is to focus on improvement in condition but with limits to invasive therapies. Accordingly, we have decided that the best plan to meet the patient's goals includes continuing with treatment.       ROS:  Review of Systems   Constitutional:  Positive for activity change. Negative for appetite change, chills and fever.   HENT:  Negative for sore throat and trouble swallowing.    Respiratory:  Negative for cough and shortness of breath.    Gastrointestinal:  Negative for abdominal pain and constipation.   Musculoskeletal:  Positive for arthralgias. Negative for back pain and myalgias.   Skin:  Negative for rash and wound.   Allergic/Immunologic: Negative for immunocompromised state.   Neurological:  Negative for weakness and headaches.   Psychiatric/Behavioral:  Negative for confusion, hallucinations and sleep disturbance.      OBJECTIVE:     Physical Exam:  Vitals: Temp: 98.5 °F (36.9 °C) (10/12/22 1530)  Pulse: 73 (10/12/22 1530)  Resp: 17 (10/12/22 1530)  BP: (!) 125/56 (10/12/22 1530)  SpO2: (!) 94 % (10/12/22 1530)    Physical Exam  Vitals reviewed.   Constitutional:       General: He is not in acute distress.     Appearance: Normal appearance. He is well-developed.   HENT:      Head: Normocephalic and atraumatic.   Eyes:       Conjunctiva/sclera: Conjunctivae normal.   Cardiovascular:      Rate and Rhythm: Normal rate and regular rhythm.      Heart sounds: Normal heart sounds. No murmur heard.  Pulmonary:      Effort: Pulmonary effort is normal. No respiratory distress.      Breath sounds: Normal breath sounds.   Abdominal:      General: Bowel sounds are normal. There is no distension.      Palpations: Abdomen is soft.      Tenderness: There is no abdominal tenderness.   Musculoskeletal:      Cervical back: Normal range of motion.      Comments: S/p sergey BKA   Skin:     General: Skin is warm and dry.      Coloration: Skin is not pale.      Findings: No rash.   Neurological:      Mental Status: He is alert and oriented to person, place, and time.      Cranial Nerves: No cranial nerve deficit.   Psychiatric:         Attention and Perception: He perceives visual hallucinations.         Mood and Affect: Affect is labile.         Speech: Speech normal.         Thought Content: Thought content normal.      Comments: Mood and behavior wax and wane between argumentative and cooperative     ASSESSMENT   Lavelle Ladd is a 67 y.o. year old with a history of PVD s/p R BKA, CKD stage III with kidney transplant, and CAD who presented to the emergency department due to concern of post operative left ankle wound. He suffered left tibial and fibular shaft fractures with application of external fixation device on 8/1/22 and postoperative course complicated by incisional infection. The ex fix was removed on 9/17 and wound vac was placed with continued IV antibiotics. Over the last month he has suffered continued infection, acute on chronic renal failure, nonhealing wounds, and ultimately proceeded with L BKA. His renal function has worsened prompting the discussion of renal replacement. Palliative Medicine was consulted to assist with goals of care discussion.      PLAN   Encounter for Palliative Care  - Code status: DNR/I  - Surrogate: named sister  Kecia (witnessed by Darline Barnhart NP)  - Ongoing discussion with team, we will continue to follow along     2. Acute on chronic CKD 3  - Defer to nephrology  - Does not require HD at this time but high risk if continued renal decompensation     3. Osteomyelitis  - s/p L BKA, now with sergey BKA     4. Acute on chronic pain  - Managed by Dr. Medel  - Management complicated by renal failure and delirium        Thank you for allowing Palliative Medicine to be involved in the care of Lavelle Ladd.    Medical decision making: HIGH based on high risk of death management of more than one chronic illness in exacerbation or progression of disease    Plan required increased review of medication choice, interaction, dosing, frequency, and route due to patient complexity. Patient complexity increased by: high risk medication use, renal insufficiency, continuous use of opioids, and poor historian    > 50% of 35 min visit spent in chart review, face to face discussion of goals of care, symptom assessment, coordination of care and emotional support, formulating and communicating prognosis and goals of care, exploring burden/ benefit of various approaches of treatment.     Norma Thompson PA-C  Palliative Medicine

## 2022-10-12 NOTE — ASSESSMENT & PLAN NOTE
9/14/22: c/o of left ankle pain-controlled. Pt was scheduled for surgery, however, surgery was post-poned 2/2 hyponatremia -Na 126. WBC normal, afebrile. Blood cultures show NGTD. .3. cont IV Abx  09/15/22- removal of the external fixator and debridement of osteomyelitis planned for today- procedure postponed due to hyponatremia- Lasix given - Nephrology following   9/16/22 - procedure postponed due to hyperglycemia  9/17/22 - post op day 1 - ABX in progress  9/19/22 - post op day 3 - Cefepime continued, Zyvox stopped. NWB. Wound care consulted for wound vac changes  9/20/22 - bone cultures pending. Aerobic culture isolated proteus mirabils  9/21/22 - Proteus mirabilis and B. faecis - Unasyn  9/22/22 - 6 weeks of Rocephin and Flagyl per Dr. Veliz  9/24/22 Vascular surgery was unable to complete the angiogram yesterday d/t refusing it. Will await further recs by Vascular surgery. Continue treatment with IV ABX  9/25/22 Ortho is recommending a BKA per vascular surgery. Awaiting Vascular surgery recs. Continue current management with IV ABX.   9/26/22 The patient is alert and oriented today. He reports that he does not remember refusing the angiogram on 9/23/22. He reports that he is agreeable to the procedure. Vascular Surgery was reconsulted today. Ortho is following. Continue IV ABX. Wound vac is in place.   9/27/22 Vascular surgery reconsulted and plans to do angiogram on 9/29/22 to evaluate for reperfusion vs amputation. Ortho is following.   9/28/22 Vascular surgery plans for a LLE angiogram in AM. NPO after MN. Ortho following   9/29/22 The patients renal function improved with IVF's. Plans for angiogram of LLE today with Vascular surgery to determine if revascularization is possible or if the patient will need a BKA. Will consult PT/OT for recs to assist with placement.   10/1/22 patient febrile ON, septic workup ordered  10/2: BC: NGTD, Afebrile overnight  10/4- BKA planned for 10-6, CM working on  placement following BKA  10/6: Left BKA completed    10/7:    Afebrile, no leukocytosis, status post BKA, received antibiotics for total duration of 25 days- discussed with ID --> considering BKA Dr. Veliz recommended to discontinue antibiotics.     10/8- s/p L BKA    Doing better but has phantom pain    10/12- BKA stump healing well. Denies any phantom pain  Palliative added small dose cymbalta

## 2022-10-12 NOTE — PROGRESS NOTES
"[facility] - Intensive Care (Hospital)  Nephrology  Progress Note    Patient Name: Lavelle Ladd  MRN: [id number]  Admission Date: 9/13/2022  Hospital Length of Stay: 28 days  Attending Provider: No att. providers found   Primary Care Physician: Primary Doctor No  Principal Problem:Acute osteomyelitis of left calcaneus  Reason for Consult: DINORAH in Kidney transplant status   Primary Nephrologist: [facility], last seen by Dr. Estrada 8/2020      Subjective:     HPI:   68 yo male with kidney transplant status last seen by Nephrology during this hospital stay for DINORAH which improved rapidly. Since then pt underwent a L BKA yesterday with subsequent hypotension requiring vasopressor support and expected anemia receiving blood transfusion currently.   Baseline allograft creatinine is around 1.4mg/dL. He has sustained several DINORAH insults in the past two years. Post operative creatinine was 1.7 and has increased to 2.8mg/dL this morning. UOP is decreased.   He denies any complaints currently, his partner is at bedside who assisted in feeding him lunch.     10/8  - delirious today  - poor UOP    10/9  - positive fluid balance, on NC today    10/10  - most lucid since I have seen him and able to have a conversation regarding dialysis. He tells me he does not want dialysis but agrees he does not need dialysis today. He is most concerned with his current phantom pain in his foot    10/11  - no nausea, no SOB  - today he tells me he is willing to proceed with dialysis but is less lucid today   - seen by [facility]     10/12  - much less confused   - when questioned if he would want dialysis if need be he stated "no" with myself and Dr. Shea present    - Woody pulled and UOP 1L following     Review of patient's allergies indicates:  No Known Allergies  Current Facility-Administered Medications   Medication Frequency    0.9%  NaCl infusion (for blood administration) Q24H PRN    acetaminophen tablet 650 mg Q4H PRN    " albuterol-ipratropium 2.5 mg-0.5 mg/3 mL nebulizer solution 3 mL Q6H PRN    alteplase injection 2 mg Once    aluminum-magnesium hydroxide-simethicone 200-200-20 mg/5 mL suspension 30 mL QID PRN    chlorhexidine 0.12 % solution 10 mL BID    cholestyramine 4 gram packet 4 g BID    dextrose 10% bolus 125 mL PRN    dextrose 10% bolus 250 mL PRN    epoetin dyana-epbx injection 4,880 Units Every Mon, Wed, Fri    famotidine tablet 20 mg Daily    fludrocortisone tablet 100 mcg Daily    glucagon (human recombinant) injection 1 mg PRN    glucose chewable tablet 16 g PRN    glucose chewable tablet 24 g PRN    haloperidol lactate injection 2 mg Q4H PRN    heparin (porcine) injection 5,000 Units Q12H    HYDROcodone-acetaminophen 5-325 mg per tablet 1 tablet Q4H PRN    hydrocortisone sod succ (PF) injection 40 mg BID    HYDROmorphone injection 1 mg Q4H PRN    HYDROmorphone tablet 2 mg Q3H PRN    influenza (QUADRIVALENT ADJUVANTED PF) vaccine 0.5 mL vaccine x 1 dose    insulin aspart U-100 pen 1-10 Units QID (AC + HS) PRN    insulin detemir U-100 pen 12 Units BID    Lactobacillus acidoph-L.bulgar 1 million cell tablet 4 tablet TID WM    melatonin tablet 6 mg Nightly PRN    miconazole nitrate 2% ointment BID    midodrine tablet 10 mg Q8H    morphine injection 3 mg Q1H PRN    naloxone 0.4 mg/mL injection 0.02 mg PRN    ondansetron disintegrating tablet 4 mg Q6H PRN    ondansetron injection 4 mg Q8H    prochlorperazine injection Soln 5 mg Q6H PRN    sertraline tablet 25 mg Daily    simethicone chewable tablet 160 mg TID PRN    sodium bicarbonate tablet 1,300 mg TID    sodium chloride 0.9% flush 10 mL Q8H PRN           Review of Systems   Constitutional:  Negative for fever.   Genitourinary:  Positive for decreased urine volume.   Allergic/Immunologic: Positive for immunocompromised state.   Objective:     Vital Signs (Most Recent):  Temp: 98 °F (36.7 °C) (10/12/22 0815)  Pulse: (!) 58 (10/12/22 0815)  Resp: 16 (10/12/22 1007)  BP: (!)  126/58 (10/12/22 0815)  SpO2: 97 % (10/12/22 0815)  O2 Device (Oxygen Therapy): room air (10/12/22 0815)   Vital Signs (24h Range):  Temp:  [97.6 °F (36.4 °C)-98.2 °F (36.8 °C)] 98 °F (36.7 °C)  Pulse:  [58-71] 58  Resp:  [16-18] 16  SpO2:  [94 %-97 %] 97 %  BP: (126-166)/(58-74) 126/58     Weight: 92.2 kg (203 lb 4.2 oz) (10/12/22 0018)  Body mass index is 28.35 kg/m².  Body surface area is 2.15 meters squared.    I/O last 3 completed shifts:  In: 354 [I.V.:354]  Out: 1250 [Urine:1250]    Physical Exam  Vitals and nursing note reviewed.   Constitutional:       Appearance: He is ill-appearing.      Comments:      Cardiovascular:      Rate and Rhythm: Normal rate.      Heart sounds:     No friction rub.   Pulmonary:      Effort: Pulmonary effort is normal. No respiratory distress.      Breath sounds: No wheezing.   Neurological:      Mental Status: He is alert.       Significant Labs:  reviewed    Assessment/Plan:     Active Diagnoses:    Diagnosis Date Noted POA    PRINCIPAL PROBLEM:  Acute osteomyelitis of left calcaneus [M86.172] 2022 Yes    Palliative care encounter [Z51.5] 10/10/2022 Not Applicable    Encephalopathy, metabolic [G93.41] 10/09/2022 No    Hypotension [I95.9] 10/06/2022 No    S/P BKA (below knee amputation), left [Z89.512] 10/06/2022 Not Applicable    Peripheral artery disease [I73.9] 2022 Yes    MARIA INES (obstructive sleep apnea) [G47.33] 2020 Yes    Old MI (myocardial infarction) [I25.2] 2019 Not Applicable    Stage 3 chronic kidney disease, DINORAH on CKD 3 [N18.30]  Yes    Long term current use of immunosuppressive drug [Z79.899] 2018 Not Applicable    Chronic obstructive pulmonary disease [J44.9] 2016 Yes    Coronary artery disease of native artery of native heart with stable angina pectoris [I25.118] 2016 Yes    Type 2 diabetes mellitus with hyperglycemia, with long-term current use of insulin [E11.65, Z79.4]  Not Applicable     donor kidney transplant  "for DM 5/21/16, DINORAH on CKD [Z94.0]  Not Applicable     Chronic    Essential hypertension [I10] 02/20/2015 Yes      Problems Resolved During this Admission:    Diagnosis Date Noted Date Resolved POA    Infection of deep incisional surgical site after procedure [T81.42XA] 09/15/2022 10/10/2022 Yes    Hyponatremia [E87.1] 08/30/2022 09/22/2022 Yes    H/O amputation [Z89.9] 12/01/2016 10/05/2022 Yes       DINORAH on CKD kidney transplant status, baseline creatinine around 1.4mg/dL  - second DINORAH episode since admission  - delta creatinine improved yesterday, similar rise today  - pt had ATN but large increase in UOP post Lasix is likely due to urinary retention from plugged up Woody   - monitor UOP with urinal and kidney function   - as above when questioned if he would want dialysis if need be he stated "no" with myself and Dr. Shea present      Volume Status  - monitor off IVFs, no need for Lasix today     Metabolic acidosis, improved/stable  - very common post txp, worsened with DINORAH X2  - continue po bicarb    BP  - stop Florinef, continue midodrine     Anemia  - H&H stable  - monitor     Kidney transplant status  - goal trough 4-7, last level of 9.9 not true trough, repeat 9.8. Hold Prograf this evening and reduce dose to 1/0.5 from 1/1  - continue to hold MMF     Delirium, improving  - multifactorial  - off gabapentin  - off Bentyl    Discussed with  and spoke with sister, Kecia, via phone.     Gianni Masterson MD  Nephrology    "
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
## 2022-10-13 NOTE — CARE UPDATE
Notified of elevated BP. Florinef discontinued as recommended by Nephrology. Midodrine dose decreased to 5mg TID- may need to discontinue. IIV hydralazine prn added

## 2022-10-13 NOTE — PLAN OF CARE
Problem: Adult Inpatient Plan of Care  Goal: Plan of Care Review  Outcome: Ongoing, Progressing  Goal: Patient-Specific Goal (Individualized)  Outcome: Ongoing, Progressing  Goal: Absence of Hospital-Acquired Illness or Injury  Outcome: Ongoing, Progressing  Goal: Optimal Comfort and Wellbeing  Outcome: Ongoing, Progressing  Goal: Readiness for Transition of Care  Outcome: Ongoing, Progressing     Problem: Infection  Goal: Absence of Infection Signs and Symptoms  Outcome: Ongoing, Progressing     Problem: Diabetes Comorbidity  Goal: Blood Glucose Level Within Targeted Range  Outcome: Ongoing, Progressing     Problem: Fluid Imbalance (Pneumonia)  Goal: Fluid Balance  Outcome: Ongoing, Progressing     Problem: Fall Injury Risk  Goal: Absence of Fall and Fall-Related Injury  Outcome: Ongoing, Progressing     Problem: Impaired Wound Healing  Goal: Optimal Wound Healing  Outcome: Ongoing, Progressing     Problem: Skin Injury Risk Increased  Goal: Skin Health and Integrity  Outcome: Ongoing, Progressing     Problem: Fall Injury Risk  Goal: Absence of Fall and Fall-Related Injury  Outcome: Ongoing, Progressing     Problem: Coping Ineffective  Goal: Effective Coping  Outcome: Ongoing, Progressing

## 2022-10-13 NOTE — ASSESSMENT & PLAN NOTE
Creatinine improved  Awaiting angio- completed  Continue tacrolimus- weekly level      10/7:    Noted to have decreased urine output, creatinine trending up, nephrology notified, follow-up on recommendations.      10/8- worsening renal function with decreased UOP and Acidosis- heading towards HD    10/9- persists, BP marginally better, continue to watch, Renal following  Start Solucortef    Overall a little better but cr increased to 4, does not HD at present and does not want HD at present    Cr increased to 4.5- try IV lasix, d/c IVF    10/12- good response to lasix, good urine output, Cr 4.4  Pt does not want any HD    10/13- improving, good urine output, Cr down to 3.6

## 2022-10-13 NOTE — PT/OT/SLP PROGRESS
Occupational Therapy      Patient Name:  Lavelle Ladd   MRN:  [id number]    S: PT COOPERATIVE WITH THERAPY SESSION.  O: PT SEEN IN ROOM WITH HOB ELEVATED AND B LE ELEVATED. PT EDUCATED ON POSITIONING OF B LE AND REPOSITION IN BED.   A: PT TX SESSION LIMITED  P:  CONTINUE WITH THERAPY SESSION. RECOMMEND: [facility]  Lola Quintero, OT  [phone]  10/13/2022

## 2022-10-13 NOTE — PROGRESS NOTES
"[facility] - Intensive Care (Hospital)  Nephrology  Progress Note    Patient Name: Lavelle Ladd  MRN: [id number]  Admission Date: 9/13/2022  Hospital Length of Stay: 29 days  Attending Provider: Faraz Ng MD   Primary Care Physician: Primary Doctor No  Principal Problem:Acute osteomyelitis of left calcaneus  Reason for Consult: DINORAH in Kidney transplant status   Primary Nephrologist: [facility], last seen by Dr. Estrada 8/2020      Subjective:     HPI:   68 yo male with kidney transplant status last seen by Nephrology during this hospital stay for DINORAH which improved rapidly. Since then pt underwent a L BKA yesterday with subsequent hypotension requiring vasopressor support and expected anemia receiving blood transfusion currently.   Baseline allograft creatinine is around 1.4mg/dL. He has sustained several DINORAH insults in the past two years. Post operative creatinine was 1.7 and has increased to 2.8mg/dL this morning. UOP is decreased.   He denies any complaints currently, his partner is at bedside who assisted in feeding him lunch.     10/8  - delirious today  - poor UOP    10/9  - positive fluid balance, on NC today    10/10  - most lucid since I have seen him and able to have a conversation regarding dialysis. He tells me he does not want dialysis but agrees he does not need dialysis today. He is most concerned with his current phantom pain in his foot    10/11  - no nausea, no SOB  - today he tells me he is willing to proceed with dialysis but is less lucid today   - seen by [facility]     10/12  - much less confused   - when questioned if he would want dialysis if need be he stated "no" with myself and Dr. Shea present    - Woody pulled and UOP 1L following     10/13  - UOP 1.3L   - feels well today, appears almost fully oriented (person place situation)     Review of patient's allergies indicates:  No Known Allergies  Current Facility-Administered Medications   Medication Frequency    0.9%  NaCl infusion " (for blood administration) Q24H PRN    acetaminophen tablet 650 mg Q4H PRN    albuterol-ipratropium 2.5 mg-0.5 mg/3 mL nebulizer solution 3 mL Q6H PRN    alteplase injection 2 mg Once    aluminum-magnesium hydroxide-simethicone 200-200-20 mg/5 mL suspension 30 mL QID PRN    chlorhexidine 0.12 % solution 10 mL BID    cholestyramine 4 gram packet 4 g BID    dextrose 10% bolus 125 mL PRN    dextrose 10% bolus 250 mL PRN    epoetin dyana-epbx injection 4,880 Units Every Mon, Wed, Fri    famotidine tablet 20 mg Daily    glucagon (human recombinant) injection 1 mg PRN    glucose chewable tablet 16 g PRN    glucose chewable tablet 24 g PRN    haloperidol lactate injection 2 mg Q4H PRN    heparin (porcine) injection 5,000 Units Q12H    hydrALAZINE injection 10 mg Q4H PRN    HYDROcodone-acetaminophen 5-325 mg per tablet 1 tablet Q4H PRN    HYDROmorphone injection 1 mg Q4H PRN    HYDROmorphone tablet 2 mg Q3H PRN    influenza (QUADRIVALENT ADJUVANTED PF) vaccine 0.5 mL vaccine x 1 dose    insulin aspart U-100 pen 1-10 Units QID (AC + HS) PRN    insulin detemir U-100 pen 12 Units BID    Lactobacillus acidoph-L.bulgar 1 million cell tablet 4 tablet TID WM    melatonin tablet 6 mg Nightly PRN    miconazole nitrate 2% ointment BID    midodrine tablet 5 mg Q8H    morphine injection 3 mg Q1H PRN    naloxone 0.4 mg/mL injection 0.02 mg PRN    ondansetron disintegrating tablet 4 mg Q6H PRN    ondansetron injection 4 mg Q8H    prochlorperazine injection Soln 5 mg Q6H PRN    sertraline tablet 25 mg Daily    simethicone chewable tablet 160 mg TID PRN    sodium bicarbonate tablet 1,300 mg TID    sodium chloride 0.9% flush 10 mL Q8H PRN    tacrolimus capsule 0.5 mg Daily PM           Review of Systems   Constitutional:  Negative for fever.   Respiratory:  Negative for shortness of breath.    Genitourinary:  Negative for decreased urine volume.   Allergic/Immunologic: Positive for immunocompromised state.   Objective:     Vital Signs (Most  Recent):  Temp: 98.5 °F (36.9 °C) (10/13/22 1232)  Pulse: 76 (10/13/22 1232)  Resp: 17 (10/13/22 1323)  BP: 135/65 (10/13/22 1232)  SpO2: 97 % (10/13/22 1232)  O2 Device (Oxygen Therapy): room air (10/13/22 0843)   Vital Signs (24h Range):  Temp:  [97.6 °F (36.4 °C)-98.5 °F (36.9 °C)] 98.5 °F (36.9 °C)  Pulse:  [71-80] 76  Resp:  [16-18] 17  SpO2:  [94 %-97 %] 97 %  BP: (124-193)/(56-88) 135/65     Weight: 94.4 kg (208 lb 1.8 oz) (10/13/22 0409)  Body mass index is 29.03 kg/m².  Body surface area is 2.17 meters squared.    I/O last 3 completed shifts:  In: 345 [P.O.:300; NG/GT:45]  Out: 3450 [Urine:2350; Stool:1100]    Physical Exam  Vitals and nursing note reviewed.   Constitutional:       Appearance: He is ill-appearing.      Comments:      Cardiovascular:      Rate and Rhythm: Normal rate.      Heart sounds:     No friction rub.   Pulmonary:      Effort: Pulmonary effort is normal. No respiratory distress.      Breath sounds: No wheezing.   Neurological:      Mental Status: He is alert.       Significant Labs:  reviewed    Assessment/Plan:     Active Diagnoses:    Diagnosis Date Noted POA    PRINCIPAL PROBLEM:  Acute osteomyelitis of left calcaneus [M86.172] 2022 Yes    Palliative care encounter [Z51.5] 10/10/2022 Not Applicable    Encephalopathy, metabolic [G93.41] 10/09/2022 No    MARIA INES (obstructive sleep apnea) [G47.33] 2020 Yes    Old MI (myocardial infarction) [I25.2] 2019 Not Applicable    Long term current use of immunosuppressive drug [Z79.899] 2018 Not Applicable    Chronic obstructive pulmonary disease [J44.9] 2016 Yes    Coronary artery disease of native artery of native heart with stable angina pectoris [I25.118] 2016 Yes    Type 2 diabetes mellitus with hyperglycemia, with long-term current use of insulin [E11.65, Z79.4]  Not Applicable     donor kidney transplant for DM 16, DINORAH on CKD [Z94.0]  Not Applicable     Chronic    Essential hypertension [I10]  02/20/2015 Yes      Problems Resolved During this Admission:    Diagnosis Date Noted Date Resolved POA    Hypotension [I95.9] 10/06/2022 10/12/2022 No    S/P BKA (below knee amputation), left [Z89.512] 10/06/2022 10/12/2022 Not Applicable    Peripheral artery disease [I73.9] 09/21/2022 10/12/2022 Yes    Infection of deep incisional surgical site after procedure [T81.42XA] 09/15/2022 10/10/2022 Yes    Hyponatremia [E87.1] 08/30/2022 09/22/2022 Yes    Stage 3 chronic kidney disease, DINORAH on CKD 3 [N18.30]  10/12/2022 Yes    H/O amputation [Z89.9] 12/01/2016 10/05/2022 Yes       DINORAH on CKD kidney transplant status, baseline creatinine around 1.4mg/dL  - second DINORAH episode since admission  - delta creatinine improved yesterday, similar rise today  - pt had ATN but large increase in UOP post Lasix is likely due to urinary retention from plugged up Woody   - monitor UOP with urinal and kidney function     Volume Status  - stable     Metabolic acidosis, improved/stable  - very common post txp, worsened with DINORAH X2  - continue po bicarb    BP  - hypertension overnight, midodrine dropped to 5mg TID  - titrate down as indicated     Anemia  - H&H stable  - monitor     Kidney transplant status  - goal trough 4-7, last level of 9.9 not true trough, repeat 9.8.   - continue 1/0.5 and check trough sat am   - continue to hold MMF     Delirium  - much improved     Gianni Masterson MD  Nephrology

## 2022-10-13 NOTE — ASSESSMENT & PLAN NOTE
Confused, disoriented since BKA and also hypotensive, acidotic, worsening renal failure, so transferred to ICU and placed on Levophed gtt  No significant improvement so far  Started on Steroids    Better, more lucid now    Waxes and wanes- more delirious today  Had tele Psych eval this am    Much better, AAOx4, speech clear, following appropriately    resolved

## 2022-10-13 NOTE — PT/OT/SLP PROGRESS
Occupational Therapy  Treatment    Lavelle Ladd   MRN: [id number]   Admitting Diagnosis: Acute osteomyelitis of left calcaneus    OT Date of Treatment: 10/13/22   OT Start Time: 1135  OT Stop Time: 1205  OT Total Time (min): 30 min    Billable Minutes:  Self Care/Home Management 15 MINUTES and Therapeutic Activity 15 MINUTES    OT/MIGUEL: OT          General Precautions: Standard, fall  Orthopedic Precautions: N/A  Braces: N/A  Respiratory Status: Room air         Subjective:  Communicated with NURSE AND Epic CHART REVIEW prior to session.    Pain/Comfort  Pain Rating 1: 5/10  Pain Rating Post-Intervention 1: 5/10  Pain Rating 2: 7/10  Location - Side 2: Left    Objective:  Patient found with: PICC line     Functional Mobility:  Bed Mobility:  SBA WITH BED RAILS WITH ROLLING L<>R  MIN A WITH FORWARD SCOOTING  MIN A WITH SUPINE< SIT TRANDFERS      Activities of Daily Living:  TOILET ING MAX A   Balance:   Static Sit: POOR+: Needs MINIMAL assist to maintain  Dynamic Sit: POOR: N/A      Therapeutic Activities and Exercises:  Patient educated on role of OT in acute setting and benefits of participation. Educated on HEP.  Pt performed 1 set x 15 reps b ue rom exercise (shoulder flex, elbow flex/ext, chest press, and hand/digits flexion/ extension). Encouraged completion of B UE AROM therex throughout the day to tolerance to increase functional strength and activity tolerance. Patient stated understanding and in agreement with POC.      AM-PAC 6 CLICK ADL   How much help from another person does this patient currently need?   1 = Unable, Total/Dependent Assistance  2 = A lot, Maximum/Moderate Assistance  3 = A little, Minimum/Contact Guard/Supervision  4 = None, Modified [place]/Independent    Putting on and taking off regular lower body clothing? : 2  Bathing (including washing, rinsing, drying)?: 2  Toileting, which includes using toilet, bedpan, or urinal? : 2  Putting on and taking off regular upper body clothing?:  "3  Taking care of personal grooming such as brushing teeth?: 3  Eating meals?: 3  Daily Activity Total Score: 15     AM-PAC Raw Score CMS "G-Code Modifier Level of Impairment Assistance   6 % Total / Unable   7 - 8 CM 80 - 100% Maximal Assist   9-13 CL 60 - 80% Moderate Assist   14 - 19 CK 40 - 60% Moderate Assist   20 - 22 CJ 20 - 40% Minimal Assist   23 CI 1-20% SBA / CGA   24 CH 0% Independent/ Mod I       Patient left HOB elevated with all lines intact, call button in reach, and nurse ryan notified    ASSESSMENT:  Lavelle Ladd is a 67 y.o. male with a medical diagnosis of Acute osteomyelitis of left calcaneus and presents with debility and generalized weakness.    Rehab identified problem list/impairments: Rehab identified problem list/impairments: weakness, impaired endurance, impaired self care skills, impaired functional mobility, impaired balance, decreased safety awareness    Rehab potential is good.    Activity tolerance: Good    Discharge recommendations: Discharge Facility/Level of Care Needs: nursing facility, skilled     Barriers to discharge:      Equipment recommendations: prosthesis, walker, rolling     GOALS:   Multidisciplinary Problems       Occupational Therapy Goals          Problem: Occupational Therapy    Goal Priority Disciplines Outcome Interventions   Occupational Therapy Goal     OT, PT/OT Ongoing, Progressing    Description: Goals to be met by: 10/21/22     Patient will increase functional independence with ADLs by performing:    Sitting at edge of bed x20 minutes with Contact Guard Assistance.  Supine to sit with Contact Guard Assistance.  Increased functional strength in B UE grossly by 1/2 MM grade.                         Plan:  Patient to be seen 2 x/week to address the above listed problems via self-care/home management, therapeutic activities, therapeutic exercises  Plan of Care expires: 10/21/22  Plan of Care reviewed with: patient         10/13/2022  "

## 2022-10-13 NOTE — PLAN OF CARE
Pt is stable on room air. Pt given pain meds as ordered. Pt still c/o 8/10 pain most of the day. Pt moving well in the bed and rolling side to side. Pt wound to sacrum area is still red, flaky, and has some small areas of slough. Pt is A&Ox3, is disoriented to situation. Pt has call light in reach.

## 2022-10-13 NOTE — CONSULTS
New consult received for buttocks wounds. WOC already following these wounds, orders in place for antifungal barrier cream.

## 2022-10-14 PROBLEM — G93.41 ENCEPHALOPATHY, METABOLIC: Status: RESOLVED | Noted: 2022-01-01 | Resolved: 2022-01-01

## 2022-10-14 NOTE — PROGRESS NOTES
[facility] - Int[facility] Care ([facility])  Adult Nutrition  Progress Note    SUMMARY       Recommendations    Recommendation/Intervention:   1. Recommend diet be advanced to Diabetic/Renal/Cardiac diet   2. Recommend Chocolate Boost Glucose Control BID   3. Recommend Arginaid BID for wound healing   4. Recommend feeding assistance, encourage PO and supplement intake     Goals:   1. Pt's diet will advance by RD Follow up  2. Pt will continue to consume 75% PO and supplement intake by RD follow up  Nutrition Goal Status: Improving towards goal    Communication of RD Recs: other (comment) (Plan of care: Sticky note)    Assessment and Plan    Nutrition Problem:  Inadequate protein-energy intake    Related to (etiology):  Increased demand for nutrition    Signs and Symptoms (as evidenced by):   Buttocks wounds, BKA surgery, 50-75% PO intake     Intervention/Recommendations (treatment strategy):  1. Recommend diet be advanced to Diabetic/Renal/Cardiac diet   2. Recommend Chocolate Boost Glucose Control BID   3. Recommend Arginaid BID for wound healing   4. Recommend feeding assistance, encourage PO and supplement intake   5. Collaboration of care with medical providers    Nutrition Diagnosis status:  Improving towards goal      Reason for Assessment    Reason For Assessment: RD follow-up  Diagnosis: other (see comments) (Acute osteomyelitis of left calcaneus)  Relevant Medical History: HTN, DMT2, CAD, COPD, CKD3  Interdisciplinary Rounds: did not attend  General Information Comments: Spoke to RN, stated pt irritable, with poor appetite/refusing to eat, possible FTT d/t additional (new) amputation. RN reported pt ate 25% for dinner, refusing breakfast, pt will sip on Boost and Arginaid and some PO intake when encouraged. Noted wt loss d/t amputation. LBM 10/6. RD will continue to monitor for PO intake.  Nutrition Discharge Planning: Diabetic/Renal/Cardiac diet    Follow up  10/14: Visited pt at bedside, pt sitting up in bed,  "alert, responsive. Pt stated that his appetite is returning, confirmed 50-75% PO intake of meals, confirms boost glucose control 50% intake, not experiencing any N/V/D. Per physician note 10/12 "great response to IV Lasix yesterday as well as with grullon pulled, he put out 1 L of urine." "Today he is lucid, no confusion" "he ate BF and lunch. He categorically refused/declined HD again if his kidney function deteriorates further". 10/13 "Good Urine output- 1.3 L", "await SNF placement". LBM 10/13. Labs, meds, and weight reviewed. Calcium (L), ALT (L), AST(L), BUN (H), Cr (H), GFR 26. Noted 16 lb charted wt loss (7% wt change) in >1 month. RD will continue to monitor.   10/11: Visited pt at bedside, pt confirmed recently transferred from the ICU d/t BKA, does not have a big appetite,stated has not been receiving any boosts glucose control, receptive to try to use to fill nutritional gaps, prefers chocolate, pt stated concern that he does not have anyone to cook for him, sister tries to help when she can. Pt not experiencing any N/V but stated has had diarrhea, per chart x 3 days. Pt was tired and requested to go to sleep. LBM 10/10. Labs, meds, weight reviewed. BUN (H), Cr (H), Glu (H), Na (L), Calcium (L). AST (L), ALT (L), GFR 13. RD will continue to monitor.     Nutrition Risk Screen    Nutrition Risk Screen: no indicators present    Nutrition/Diet History    Spiritual, Cultural Beliefs, Druze Practices, Values that Affect Care: no  Food Allergies: NKFA  Factors Affecting Nutritional Intake: decreased appetite, depression    Anthropometrics    Temp: 97.7 °F (36.5 °C)  Height Method: Stated  Height: 5' 11" (180.3 cm)  Height (inches): 71 in  Weight Method: Bed Scale  Weight: 94.4 kg (208 lb 1.8 oz)  Weight (lb): 208.12 lb  Ideal Body Weight (IBW), Male: 172 lb  % Ideal Body Weight, Male (lb): 110.49 %  BMI (Calculated): 29  BMI Grade: 25 - 29.9 - overweight  Amputation %: 5.9, 5.9  Total Amputation %: " 11.8  Amputation Ideal Body Weight (IBW), Male (lb): 160.2 lb  Amputee BMI (kg/m2): 30.13 kg/m2  Additional Documentation: Amputations Present (Ideal Body Weight)    Lab/Procedures/Meds    Pertinent Labs Reviewed: reviewed  Pertinent Labs Comments: Na 128, Cr 1.6, GFR 46, Glu 266, Mg 1.4, Alb 1.8, , ALT 94, A1c 7.4% on 8/4/22  Pertinent Medications Reviewed: reviewed  Pertinent Medications Comments: atorvastatin, carvedilol, furosemide, gabapentin, insulin, magnesium oxide, tacrolimus  BMP  Lab Results   Component Value Date     10/14/2022    K 3.8 10/14/2022     10/14/2022    CO2 23 10/14/2022    BUN 36 (H) 10/14/2022    CREATININE 2.6 (H) 10/14/2022    CALCIUM 8.5 (L) 10/14/2022    ANIONGAP 8 10/14/2022    ESTGFRAFRICA 47 (A) 08/15/2021    EGFRNONAA 41 (A) 08/15/2021      Recent Labs   Lab 10/14/22  0618   POCTGLUCOSE 80     Lab Results   Component Value Date    ALT <5 (L) 10/11/2022    AST 5 (L) 10/11/2022    ALKPHOS 96 10/11/2022    BILITOT 0.3 10/11/2022         Scheduled Meds:   alteplase  2 mg Intra-Catheter Once    chlorhexidine  10 mL Mouth/Throat BID    cholestyramine  1 packet Oral BID    epoetin dyana-epbx  50 Units/kg Subcutaneous Every Mon, Wed, Fri    famotidine  20 mg Oral Daily    heparin (porcine)  5,000 Units Subcutaneous Q12H    insulin detemir U-100  12 Units Subcutaneous BID    Lactobacillus acidoph-L.bulgar  4 tablet Oral TID WM    miconazole nitrate 2%   Topical (Top) BID    midodrine  5 mg Oral Q8H    ondansetron  4 mg Intravenous Q8H    sertraline  25 mg Oral Daily    sodium bicarbonate  1,300 mg Oral TID    tacrolimus  0.5 mg Oral Daily PM    tacrolimus  1 mg Oral Daily AM     Continuous Infusions:      PRN Meds:.sodium chloride, acetaminophen, albuterol-ipratropium, aluminum-magnesium hydroxide-simethicone, dextrose 10%, dextrose 10%, glucagon (human recombinant), glucose, glucose, haloperidol lactate, hydrALAZINE, HYDROcodone-acetaminophen, HYDROmorphone, HYDROmorphone,  influenza, insulin aspart U-100, melatonin, morphine, naloxone, ondansetron, prochlorperazine, simethicone, sodium chloride 0.9%   Physical Findings/Assessment    Wt Readings from Last 15 Encounters:   10/13/22 94.4 kg (208 lb 1.8 oz)   09/02/22 102 kg (224 lb 13.9 oz)   08/08/22 114.7 kg (252 lb 13.9 oz)   08/15/21 102.5 kg (226 lb)   02/11/21 101.6 kg (224 lb)   10/07/20 101.6 kg (224 lb)   08/12/20 101.6 kg (224 lb)   07/08/20 104.3 kg (230 lb)   07/07/20 104.3 kg (230 lb)   07/06/20 108.1 kg (238 lb 6.4 oz)   06/30/20 104.5 kg (230 lb 6.1 oz)   06/24/20 105.4 kg (232 lb 5.8 oz)   03/04/20 104.6 kg (230 lb 9.6 oz)   02/21/20 103.6 kg (228 lb 6.3 oz)   02/14/20 100.7 kg (222 lb)   ]    Estimated/Assessed Needs    Weight Used For Calorie Calculations: 72.8 kg (160 lb 7.9 oz) (Adj for both BKA)  Energy Calorie Requirements (kcal): 1818 (MSJ x 1.2 AF)  Energy Need Method: [place]  Protein Requirements: 72-91 (1-1.25g/kg (Adj for age, amputations))  Weight Used For Protein Calculations: 72.8 kg (160 lb 7.9 oz)  Fluid Requirements (mL): 1818 (ml/kcal)  Estimated Fluid Requirement Method: RDA Method  RDA Method (mL): 1818  CHO Requirement: 227      Nutrition Prescription Ordered    Current Diet Order: Cardiac    Evaluation of Received Nutrient/Fluid Intake  I/O: -601.1 (Net since admit)  % Kcal Needs: 0  % Protein Needs: 0  I/O: 0.5 L  Energy Calories Required: not meeting needs  Protein Required: not meeting needs  Fluid Required: meeting needs  Comments: LBM 9/15  % Intake of Estimated Energy Needs: 50-75%  % Meal Intake: 50-75%    Nutrition Risk    Level of Risk/Frequency of Follow-up: Low (F/u 1 x weekly)     Monitor and Evaluation    Food and Nutrient Intake: food and beverage intake  Food and Nutrient Adminstration: diet order  Knowledge/Beliefs/Attitudes: food and nutrition knowledge/skill, beliefs and attitudes  Physical Activity and Function: nutrition-related ADLs and IADLs  Anthropometric  Measurements: weight, weight change, body mass index  Biochemical Data, Medical Tests and Procedures: electrolyte and renal panel, lipid profile, gastrointestinal profile, glucose/endocrine profile, inflammatory profile  Nutrition-Focused Physical Findings: overall appearance     Nutrition Follow-Up    RD Follow-up?: Yes  Sherri Heard Dietitibing
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
## 2022-10-14 NOTE — PROGRESS NOTES
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 Barriers      Faraz Ng MD  Department of Hospital Medicine   [facility] - Med Surg

## 2022-10-14 NOTE — SUBJECTIVE & OBJECTIVE
Interval History: looks and feels much better, lying comfortably, states he is tired, weary from the PT but otherwise in good spirits, eating drinking well, continues to have great urine output and Cr down to 2.5. await ADAM placement.      Review of Systems   Constitutional:  Positive for activity change and appetite change. Negative for chills, diaphoresis and fatigue.   HENT: Negative.     Eyes: Negative.    Respiratory: Negative.  Negative for cough and shortness of breath.    Cardiovascular: Negative.  Negative for chest pain and leg swelling.   Gastrointestinal: Negative.    Endocrine: Negative.    Genitourinary: Negative.    Musculoskeletal:  Negative for gait problem.   Skin: Negative.    Neurological:  Positive for weakness.   Hematological: Negative.    Psychiatric/Behavioral: Negative.     Objective:     Vital Signs (Most Recent):  Temp: 98.2 °F (36.8 °C) (10/14/22 1210)  Pulse: 89 (10/14/22 1210)  Resp: 17 (10/14/22 1210)  BP: 137/75 (10/14/22 1210)  SpO2: 95 % (10/14/22 1210) Vital Signs (24h Range):  Temp:  [97.3 °F (36.3 °C)-98.2 °F (36.8 °C)] 98.2 °F (36.8 °C)  Pulse:  [68-89] 89  Resp:  [16-18] 17  SpO2:  [94 %-97 %] 95 %  BP: (118-156)/(56-75) 137/75     Weight: 94.4 kg (208 lb 1.8 oz)  Body mass index is 29.03 kg/m².    Intake/Output Summary (Last 24 hours) at 10/14/2022 1809  Last data filed at 10/14/2022 1727  Gross per 24 hour   Intake 350 ml   Output 1275 ml   Net -925 ml      Physical Exam  Vitals and nursing note reviewed.   Constitutional:       General: He is awake.      Appearance: He is overweight. He is ill-appearing. He is not toxic-appearing.   HENT:      Head: Normocephalic and atraumatic.      Mouth/Throat:      Mouth: Mucous membranes are moist.      Pharynx: Oropharynx is clear.   Eyes:      General: No scleral icterus.     Extraocular Movements: Extraocular movements intact.      Conjunctiva/sclera: Conjunctivae normal.      Pupils: Pupils are equal, round, and reactive to light.    Neck:      Vascular: No JVD.   Cardiovascular:      Rate and Rhythm: Normal rate and regular rhythm.      Pulses:           Radial pulses are 2+ on the right side and 2+ on the left side.   Pulmonary:      Effort: Pulmonary effort is normal.      Breath sounds: Decreased breath sounds present. No wheezing or rhonchi.   Abdominal:      General: Abdomen is flat. Bowel sounds are normal. There is no distension.      Palpations: Abdomen is soft.   Musculoskeletal:      Cervical back: Normal range of motion and neck supple. No rigidity.      Right lower leg: No edema.      Left lower leg: No edema.      Comments: B BKA. L Stump dressing dry, clean   Lymphadenopathy:      Cervical: No cervical adenopathy.   Skin:     General: Skin is warm and dry.      Capillary Refill: Capillary refill takes 2 to 3 seconds.      Coloration: Skin is not jaundiced.      Findings: No bruising.          Neurological:      General: No focal deficit present.      Mental Status: He is alert and oriented to person, place, and time.      GCS: GCS eye subscore is 4. GCS verbal subscore is 5. GCS motor subscore is 6.      Motor: Weakness present.      Gait: Gait abnormal.   Psychiatric:         Attention and Perception: He is attentive.         Mood and Affect: Mood normal. Affect is blunt.         Speech: Speech normal.         Behavior: Behavior normal. Behavior is not agitated or aggressive. Behavior is cooperative.         Judgment: Judgment is impulsive.       Significant Labs: All pertinent labs within the past 24 hours have been reviewed.  BMP:   Recent Labs   Lab 10/14/22  0657   GLU 78      K 3.8      CO2 23   BUN 36*   CREATININE 2.6*   CALCIUM 8.5*   MG 2.1     CBC:   Recent Labs   Lab 10/13/22  0519 10/14/22  0657   WBC 3.27* 4.45   HGB 9.2* 9.0*   HCT 29.2* 29.4*    218     CMP:   Recent Labs   Lab 10/13/22  0519 10/14/22  0657   * 139   K 4.0 3.8    108   CO2 20* 23   * 78   BUN 38* 36*    CREATININE 3.6* 2.6*   CALCIUM 8.4* 8.5*   ANIONGAP 10 8       Significant Imaging: I have reviewed all pertinent imaging results/findings within the past 24 hours.

## 2022-10-14 NOTE — PT/OT/SLP PROGRESS
"Physical Therapy  Treatment    Lavelle Ladd   MRN: [id number]   Admitting Diagnosis: Acute osteomyelitis of left calcaneus    PT Received On: 10/07/22  PT Start Time: 1515     PT Stop Time: 1530    PT Total Time (min): 15 min       Billable Minutes:  Therapeutic Exercise 15    Treatment Type: Treatment  PT/PTA: PT     PTA Visit Number: 0       General Precautions: Standard, fall  Orthopedic Precautions: N/A   Braces:  (amputation brace to promote TKE)  Respiratory Status: Room air    Spiritual, Cultural Beliefs, Adventism Practices, Values that Affect Care: no    Subjective:  Communicated with nurse Newton prior to session.  Pt agreeable to supine therex with max encouragement.     Pain/Comfort  Pain Rating 1:  ("I don't feel good")    Objective:   Patient found with: peripheral IV, telemetry, bowel management system      Therapeutic Activities and Exercises:  Unwilling to participate in EOB/OOB activity. Educated pt on importance of consistent participation in PT interventions to meet functional goals. Educated and facilitated supine BLE therex including SLR and RLE knee flex/ext to promote joint mobility and strength.     AM-PAC 6 CLICK MOBILITY  How much help from another person does this patient currently need?   1 = Unable, Total/Dependent Assistance  2 = A lot, Maximum/Moderate Assistance  3 = A little, Minimum/Contact Guard/Supervision  4 = None, Modified [place]/Independent    Turning over in bed (including adjusting bedclothes, sheets and blankets)?: 1  Sitting down on and standing up from a chair with arms (e.g., wheelchair, bedside commode, etc.): 1  Moving from lying on back to sitting on the side of the bed?: 1  Moving to and from a bed to a chair (including a wheelchair)?: 1  Need to walk in hospital room?: 1  Climbing 3-5 steps with a railing?: 1  Basic Mobility Total Score: 6    AM-PAC Raw Score CMS G-Code Modifier Level of Impairment Assistance   6 % Total / Unable   7 - 9 CM 80 - 100% " Maximal Assist   10 - 14 CL 60 - 80% Moderate Assist   15 - 19 CK 40 - 60% Moderate Assist   20 - 22 CJ 20 - 40% Minimal Assist   23 CI 1-20% SBA / CGA   24 CH 0% Independent/ Mod I     Patient left supine with all lines intact, call button in reach, bed alarm on, and nursing notified.    Assessment:  Lavelle Ladd is a 67 y.o. male with a medical diagnosis of Acute osteomyelitis of left calcaneus and presents with the impairments listed below. Pt will benefit from continued PT services to address deficits and to decrease caregiver burden.    Rehab identified problem list/impairments: Rehab identified problem list/impairments: weakness, impaired functional mobility, impaired endurance, impaired cognition, decreased safety awareness, impaired balance    Rehab potential is fair.    Activity tolerance: Fair    Discharge recommendations: Discharge Facility/Level of Care Needs: nursing facility, basic, nursing facility, skilled     Barriers to discharge:      Equipment recommendations:       GOALS:   Multidisciplinary Problems       Physical Therapy Goals          Problem: Physical Therapy    Goal Priority Disciplines Outcome Goal Variances Interventions   Physical Therapy Goal     PT, PT/OT Ongoing, Progressing     Description: Pt will perform bed mobility independently in order to participate in EOB activity.  Pt will perform transfers SBA with sliding board in order to participate in OOB activity.   Pt will perform w/c mobility with SUP in order to participate in daily tasks.    Pt will tolerate sitting OOB x 3-4 hrs to participate in daily tasks.                       PLAN:    Patient to be seen 3 x/week  to address the above listed problems via therapeutic activities, gait training, therapeutic exercises, wheelchair management/training, neuromuscular re-education  Plan of Care expires: 10/21/22  Plan of Care reviewed with: patient         10/14/2022
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
## 2022-10-14 NOTE — PLAN OF CARE
Pt is stable on room air. Pt refused all BS checks today and all meds after 1005 today. I educated the pt on the importance and offered to pt multiple times and pt still refused. Pt would call and say he is feeling bad. I offered multiple times to help the pt, offered him pain meds, repo, heat and cool packs, and the pt still refused help or assistance. Pt did work with PT/OT on some exercises today. Pt is A&Ox3, disoriented to time. Pt has call light in reach. All safety precautions are in place.

## 2022-10-14 NOTE — PLAN OF CARE
TX COMPLETED: pt unwilling to participate in EOB activity, per nsg pt also refusing meds. Facilitated supine therex. Recommend SNF vs basic

## 2022-10-14 NOTE — ASSESSMENT & PLAN NOTE
Creatinine improved  Awaiting angio- completed  Continue tacrolimus- weekly level      10/7:    Noted to have decreased urine output, creatinine trending up, nephrology notified, follow-up on recommendations.      10/8- worsening renal function with decreased UOP and Acidosis- heading towards HD    10/9- persists, BP marginally better, continue to watch, Renal following  Start Solucortef    Overall a little better but cr increased to 4, does not HD at present and does not want HD at present    Cr increased to 4.5- try IV lasix, d/c IVF    10/12- good response to lasix, good urine output, Cr 4.4  Pt does not want any HD    10/13- improving, good urine output, Cr down to 3.6    Cr 2.6- good diuresis- good recovery

## 2022-10-14 NOTE — PT/OT/SLP PROGRESS
"Occupational Therapy      Patient Name:  Lavelle Ladd   MRN:  [id number]  S: PT REFUSED OOB ACTIVITY  AND BED THERAPEUTIC EXERCISE.  O: PT SEEN IN ROOM WITH HOB ELEVATED  . PT REFUSED ALL THERAPEUTIC ACTIVITIES DUE TO " NOT FEELING WELL." NURSE SAL NOTIFIED. PT EDUCATED ON POSITIONING ON L LE AS WELL AS THERAPEUTIC EXERCISE WHILE IN BED THROUGHOUT THE DAY.    A: PT RETURNED DEMONSTRATION AND IN AGREEMENT WITH POC  P: CONTINUE WITH POC. RECOMMEND: [facility]   [id number]  1 TA    "

## 2022-10-14 NOTE — PLAN OF CARE
Nutrition Recommendations 10/14:  1. Recommend diet be advanced to Diabetic/Renal/Cardiac diet   2. Recommend Chocolate Boost Glucose Control BID   3. Recommend Arginaid BID for wound healing   4. Recommend feeding assistance, encourage PO and supplement intake   5. Collaboration of care with medical providers  Sherri Heard Dietitian

## 2022-10-14 NOTE — PLAN OF CARE
Attempted to call Crys from [facility] for pt to return. No answer. Left message. Sent updated packet.    Bello Horan LMSW 10/14/2022 3:08 PM
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
## 2022-10-14 NOTE — PLAN OF CARE
Called Bill Brewer with [facility]. Pt is still pending authorization.    Bello Horan LMSW 10/14/2022 3:19 PM

## 2022-10-14 NOTE — ASSESSMENT & PLAN NOTE
Patient's FSGs are uncontrolled due to hyperglycemia on current medication regimen.  Last A1c reviewed-   Lab Results   Component Value Date    HGBA1C 7.4 (H) 08/04/2022     Most recent fingerstick glucose reviewed-   Recent Labs   Lab 10/13/22  2013 10/14/22  0618   POCTGLUCOSE 129* 80     Current correctional scale  Low  Maintain anti-hyperglycemic dose as follows-   Antihyperglycemics (From admission, onward)    Start     Stop Route Frequency Ordered    10/10/22 0900  insulin detemir U-100 pen 12 Units         -- SubQ 2 times daily 10/10/22 0745    10/07/22 1919  insulin aspart U-100 pen 1-10 Units         -- SubQ Before meals & nightly PRN 10/07/22 1819        Levemir added- changed to bid dosing and moderate sliding scale - dose increased from 16 units to 30 Units  Hold Oral hypoglycemics while patient is in the hospital.  9/24/22 The patient had a rapid response called over night. His blood glucose dropped to <20. He was given an amp of D50 and he returned to baseline.   10/14/2022   Stable

## 2022-10-15 PROBLEM — R53.81 DEBILITY: Status: ACTIVE | Noted: 2022-01-01

## 2022-10-15 PROBLEM — D64.9 NORMOCYTIC ANEMIA: Status: ACTIVE | Noted: 2022-01-01

## 2022-10-15 NOTE — NURSING
Pt remains free from falls/injuries this shift. Safety precautions maintained. Pain managed with PRN meds. Rectal tube inadvertently removed per pt. Replaced and now in tact. No s/s of acute distress noted. Will continue to monitor. Chart check completed.     recommend seeing GI or G-Surg

## 2022-10-15 NOTE — PLAN OF CARE
Refused all treatments today.    Problem: Adult Inpatient Plan of Care  Goal: Plan of Care Review  Outcome: Ongoing, Progressing  Goal: Patient-Specific Goal (Individualized)  Outcome: Ongoing, Progressing  Goal: Absence of Hospital-Acquired Illness or Injury  Outcome: Ongoing, Progressing  Goal: Optimal Comfort and Wellbeing  Outcome: Ongoing, Progressing  Goal: Readiness for Transition of Care  Outcome: Ongoing, Progressing     Problem: Infection  Goal: Absence of Infection Signs and Symptoms  Outcome: Ongoing, Progressing     Problem: Diabetes Comorbidity  Goal: Blood Glucose Level Within Targeted Range  Outcome: Ongoing, Progressing

## 2022-10-15 NOTE — ASSESSMENT & PLAN NOTE
Patient's FSGs are uncontrolled due to hyperglycemia on current medication regimen.  Last A1c reviewed-   Lab Results   Component Value Date    HGBA1C 7.4 (H) 08/04/2022     Most recent fingerstick glucose reviewed-   Recent Labs   Lab 10/15/22  1107   POCTGLUCOSE 176*     Current correctional scale  Low  Maintain anti-hyperglycemic dose as follows-   Antihyperglycemics (From admission, onward)    Start     Stop Route Frequency Ordered    10/10/22 0900  insulin detemir U-100 pen 12 Units         -- SubQ 2 times daily 10/10/22 0745    10/07/22 1919  insulin aspart U-100 pen 1-10 Units         -- SubQ Before meals & nightly PRN 10/07/22 1819        Levemir added- changed to bid dosing and moderate sliding scale - dose increased from 16 units to 30 Units  Hold Oral hypoglycemics while patient is in the hospital.  9/24/22 The patient had a rapid response called over night. His blood glucose dropped to <20. He was given an amp of D50 and he returned to baseline.     10/15 Blood sugars running

## 2022-10-15 NOTE — PROGRESS NOTES
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 injection 5,000 Units  Every 12 hours         10/08/22 0911     heparin (porcine) injection 5,000 Units  Every 8 hours         09/18/22 1104     Reason for No Pharmacological VTE Prophylaxis  Once        Question:  Reasons:  Answer:  Risk of Bleeding    09/13/22 2023     IP VTE HIGH RISK PATIENT  Once         09/13/22 2023                Discharge Planning   LISETH:      Code Status: DNR   Is the patient medically ready for discharge?:     Reason for patient still in hospital (select all that apply): Treatment  Discharge Plan A: Skilled Nursing Facility   Discharge Delays: (!) Patient and Family Barriers    Time spent seeing patient( greater than 1/2 spent in direct contact) : 38 minutes      THIAGO Atkins  Department of Hospital Medicine   [facility] - Med Surg

## 2022-10-15 NOTE — ASSESSMENT & PLAN NOTE
9/14/22: c/o of left ankle pain-controlled. Pt was scheduled for surgery, however, surgery was post-poned 2/2 hyponatremia -Na 126. WBC normal, afebrile. Blood cultures show NGTD. .3. cont IV Abx  09/15/22- removal of the external fixator and debridement of osteomyelitis planned for today- procedure postponed due to hyponatremia- Lasix given - Nephrology following   9/16/22 - procedure postponed due to hyperglycemia  9/17/22 - post op day 1 - ABX in progress  9/19/22 - post op day 3 - Cefepime continued, Zyvox stopped. NWB. Wound care consulted for wound vac changes  9/20/22 - bone cultures pending. Aerobic culture isolated proteus mirabils  9/21/22 - Proteus mirabilis and B. faecis - Unasyn  9/22/22 - 6 weeks of Rocephin and Flagyl per Dr. Veliz  9/24/22 Vascular surgery was unable to complete the angiogram yesterday d/t refusing it. Will await further recs by Vascular surgery. Continue treatment with IV ABX  9/25/22 Ortho is recommending a BKA per vascular surgery. Awaiting Vascular surgery recs. Continue current management with IV ABX.   9/26/22 The patient is alert and oriented today. He reports that he does not remember refusing the angiogram on 9/23/22. He reports that he is agreeable to the procedure. Vascular Surgery was reconsulted today. Ortho is following. Continue IV ABX. Wound vac is in place.   9/27/22 Vascular surgery reconsulted and plans to do angiogram on 9/29/22 to evaluate for reperfusion vs amputation. Ortho is following.   9/28/22 Vascular surgery plans for a LLE angiogram in AM. NPO after MN. Ortho following   9/29/22 The patients renal function improved with IVF's. Plans for angiogram of LLE today with Vascular surgery to determine if revascularization is possible or if the patient will need a BKA. Will consult PT/OT for recs to assist with placement.   10/1/22 patient febrile ON, septic workup ordered  10/2: BC: NGTD, Afebrile overnight  10/4- BKA planned for 10-6, CM working on  placement following BKA  10/6: Left BKA completed    10/7:    Afebrile, no leukocytosis, status post BKA, received antibiotics for total duration of 25 days- discussed with ID --> considering BKA Dr. Veliz recommended to discontinue antibiotics.     10/8- s/p L BKA    Doing better but has phantom pain    10/12- BKA stump healing well. Denies any phantom pain  Palliative added small dose cymbalta    10/15 Stable, Off Abx
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
## 2022-10-15 NOTE — SUBJECTIVE & OBJECTIVE
Interval History:  Continue current treatment. Awaiting placement.     Review of Systems   Constitutional:  Positive for activity change, appetite change and fatigue. Negative for chills, diaphoresis and fever.   HENT:  Negative for congestion, ear pain, facial swelling and trouble swallowing.    Eyes:  Negative for pain and redness.   Respiratory:  Negative for cough and shortness of breath.    Cardiovascular:  Negative for chest pain, palpitations and leg swelling.   Gastrointestinal:  Negative for abdominal distention, abdominal pain, blood in stool, constipation, diarrhea, nausea and vomiting.   Endocrine: Negative for polydipsia and polyphagia.   Genitourinary:  Negative for difficulty urinating, dysuria, flank pain and hematuria.   Musculoskeletal:  Negative for gait problem.   Skin:  Positive for wound. Negative for color change.        BKA incision   Allergic/Immunologic: Negative for food allergies.   Neurological:  Positive for weakness. Negative for facial asymmetry and speech difficulty.   Hematological:  Does not bruise/bleed easily.   Psychiatric/Behavioral:  Negative for agitation, behavioral problems, confusion and suicidal ideas. The patient is not nervous/anxious.         Depressed   Objective:     Vital Signs (Most Recent):  Temp: 97.5 °F (36.4 °C) (10/15/22 1626)  Pulse: 86 (10/15/22 1626)  Resp: 15 (10/15/22 1711)  BP: (!) 145/74 (10/15/22 1626)  SpO2: (!) 94 % (10/15/22 1626)   Vital Signs (24h Range):  Temp:  [97.5 °F (36.4 °C)-98.2 °F (36.8 °C)] 97.5 °F (36.4 °C)  Pulse:  [79-92] 86  Resp:  [14-18] 15  SpO2:  [94 %-97 %] 94 %  BP: (119-169)/(54-79) 145/74     Weight: 96.5 kg (212 lb 11.9 oz)  Body mass index is 29.67 kg/m².    Intake/Output Summary (Last 24 hours) at 10/15/2022 1718  Last data filed at 10/15/2022 1718  Gross per 24 hour   Intake 420 ml   Output 1025 ml   Net -605 ml      Physical Exam  Vitals and nursing note reviewed.   Constitutional:       General: He is awake. He is not  in acute distress.     Appearance: He is overweight. He is not ill-appearing, toxic-appearing or diaphoretic.      Comments: Weak   HENT:      Head: Normocephalic and atraumatic.      Mouth/Throat:      Mouth: Mucous membranes are moist.   Eyes:      General: No scleral icterus.        Right eye: No discharge.         Left eye: No discharge.      Extraocular Movements: Extraocular movements intact.      Conjunctiva/sclera: Conjunctivae normal.      Pupils: Pupils are equal, round, and reactive to light.   Neck:      Vascular: No JVD.   Cardiovascular:      Rate and Rhythm: Normal rate and regular rhythm.      Pulses:           Radial pulses are 2+ on the right side and 2+ on the left side.      Heart sounds: Normal heart sounds. No murmur heard.     Comments: Bilateral BKA  Pulmonary:      Effort: Pulmonary effort is normal. No respiratory distress.      Breath sounds: Decreased breath sounds present. No wheezing, rhonchi or rales.   Abdominal:      General: Abdomen is flat. Bowel sounds are normal. There is no distension.      Palpations: Abdomen is soft.      Tenderness: There is no abdominal tenderness. There is no guarding.      Comments:     Genitourinary:     Comments: Not examined    Rectal tube noted  Musculoskeletal:         General: Normal range of motion.      Cervical back: Normal range of motion and neck supple. No rigidity or tenderness.      Right lower leg: No edema.      Left lower leg: No edema.      Comments:  Left BKA MIGUEL with staples intact      Skin:     General: Skin is warm and dry.      Capillary Refill: Capillary refill takes 2 to 3 seconds.      Coloration: Skin is pale.          Neurological:      General: No focal deficit present.      Mental Status: He is alert and oriented to person, place, and time. Mental status is at baseline.      Motor: Weakness present.   Psychiatric:         Attention and Perception: He is attentive.         Mood and Affect: Mood is depressed. Affect is blunt.          Speech: Speech normal.         Behavior: Behavior normal. Behavior is not agitated or aggressive. Behavior is cooperative.         Thought Content: Thought content normal.        Lab Results   Component Value Date    WBC 6.57 10/15/2022    HGB 8.9 (L) 10/15/2022    HCT 29.6 (L) 10/15/2022    MCV 91 10/15/2022     10/15/2022       BMP  Lab Results   Component Value Date     10/15/2022    K 4.0 10/15/2022     10/15/2022    CO2 23 10/15/2022    BUN 29 (H) 10/15/2022    CREATININE 1.9 (H) 10/15/2022    CALCIUM 8.5 (L) 10/15/2022    ANIONGAP 9 10/15/2022    ESTGFRAFRICA 47 (A) 08/15/2021    EGFRNONAA 41 (A) 08/15/2021
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
## 2022-10-15 NOTE — ASSESSMENT & PLAN NOTE
S/p kidney transplant   -medications - mycophenolate continued    Prograf 9.9- continue    Good response to stress dose steroids  Solucortef lowered to 40 qid    switch to prednisone now    10/15 Orders as per Nephrology

## 2022-10-15 NOTE — SUBJECTIVE & OBJECTIVE
Interval History: Pt was seen and examined. Labs and meds reviewed. Discussed with other providers. Pt does not talk much, no overall change. Per RN, pt refused meds, including prograf last night.    Review of patient's allergies indicates:  No Known Allergies  Current Facility-Administered Medications   Medication Frequency    0.9%  NaCl infusion (for blood administration) Q24H PRN    acetaminophen tablet 650 mg Q4H PRN    albuterol-ipratropium 2.5 mg-0.5 mg/3 mL nebulizer solution 3 mL Q6H PRN    alteplase injection 2 mg Once    aluminum-magnesium hydroxide-simethicone 200-200-20 mg/5 mL suspension 30 mL QID PRN    chlorhexidine 0.12 % solution 10 mL BID    cholestyramine 4 gram packet 4 g BID    dextrose 10% bolus 125 mL PRN    dextrose 10% bolus 250 mL PRN    epoetin dyana-epbx injection 4,880 Units Every Mon, Wed, Fri    famotidine tablet 20 mg Daily    glucagon (human recombinant) injection 1 mg PRN    glucose chewable tablet 16 g PRN    glucose chewable tablet 24 g PRN    haloperidol lactate injection 2 mg Q4H PRN    heparin (porcine) injection 5,000 Units Q12H    hydrALAZINE injection 10 mg Q4H PRN    HYDROcodone-acetaminophen 5-325 mg per tablet 1 tablet Q4H PRN    HYDROmorphone injection 1 mg Q4H PRN    HYDROmorphone tablet 2 mg Q3H PRN    influenza (QUADRIVALENT ADJUVANTED PF) vaccine 0.5 mL vaccine x 1 dose    insulin aspart U-100 pen 1-10 Units QID (AC + HS) PRN    insulin detemir U-100 pen 12 Units BID    Lactobacillus acidoph-L.bulgar 1 million cell tablet 4 tablet TID WM    melatonin tablet 6 mg Nightly PRN    miconazole nitrate 2% ointment BID    midodrine tablet 5 mg Q8H    morphine injection 3 mg Q1H PRN    naloxone 0.4 mg/mL injection 0.02 mg PRN    ondansetron disintegrating tablet 4 mg Q6H PRN    ondansetron injection 4 mg Q8H    prochlorperazine injection Soln 5 mg Q6H PRN    sertraline tablet 25 mg Daily    simethicone chewable tablet 160 mg TID PRN    sodium bicarbonate tablet 1,300 mg TID     sodium chloride 0.9% flush 10 mL Q8H PRN    tacrolimus capsule 1 mg BID       Objective:     Vital Signs (Most Recent):  Temp: 97.6 °F (36.4 °C) (10/15/22 1250)  Pulse: 79 (10/15/22 1250)  Resp: 14 (10/15/22 1454)  BP: (!) 149/71 (10/15/22 1250)  SpO2: 96 % (10/15/22 1250)  O2 Device (Oxygen Therapy): room air (10/13/22 2000)   Vital Signs (24h Range):  Temp:  [97.6 °F (36.4 °C)-98.2 °F (36.8 °C)] 97.6 °F (36.4 °C)  Pulse:  [79-92] 79  Resp:  [14-18] 14  SpO2:  [96 %-97 %] 96 %  BP: (119-169)/(54-79) 149/71     Weight: 96.5 kg (212 lb 11.9 oz) (10/14/22 2006)  Body mass index is 29.67 kg/m².  Body surface area is 2.2 meters squared.    I/O last 3 completed shifts:  In: 350 [P.O.:300; NG/GT:50]  Out: 1275 [Urine:1275]    Physical Exam  Vitals and nursing note reviewed.   Constitutional:       Appearance: Normal appearance.   Cardiovascular:      Rate and Rhythm: Normal rate and regular rhythm.      Pulses: Normal pulses.      Heart sounds: Normal heart sounds.   Pulmonary:      Effort: Pulmonary effort is normal.      Breath sounds: Normal breath sounds.   Neurological:      Mental Status: He is alert.       Significant Labs: reviewed  BMP  Lab Results   Component Value Date     10/15/2022    K 4.0 10/15/2022     10/15/2022    CO2 23 10/15/2022    BUN 29 (H) 10/15/2022    CREATININE 1.9 (H) 10/15/2022    CALCIUM 8.5 (L) 10/15/2022    ANIONGAP 9 10/15/2022    ESTGFRAFRICA 47 (A) 08/15/2021    EGFRNONAA 41 (A) 08/15/2021     Lab Results   Component Value Date    WBC 6.57 10/15/2022    HGB 8.9 (L) 10/15/2022    HCT 29.6 (L) 10/15/2022    MCV 91 10/15/2022     10/15/2022     Tacrolimus level 3.7    Significant Imaging: reviewed

## 2022-10-15 NOTE — ASSESSMENT & PLAN NOTE
Creatinine improved  Awaiting angio- completed  Continue tacrolimus- weekly level      10/7:    Noted to have decreased urine output, creatinine trending up, nephrology notified, follow-up on recommendations.      10/8- worsening renal function with decreased UOP and Acidosis- heading towards HD    10/9- persists, BP marginally better, continue to watch, Renal following  Start Solucortef    Overall a little better but cr increased to 4, does not HD at present and does not want HD at present    Cr increased to 4.5- try IV lasix, d/c IVF    10/12- good response to lasix, good urine output, Cr 4.4  Pt does not want any HD    10/13- improving, good urine output, Cr down to 3.6    Cr 2.6- good diuresis- good recovery    10/15 Stable

## 2022-10-15 NOTE — ASSESSMENT & PLAN NOTE
Not in exacerbation  -nebulizer treaments   -supplemental oxygen as needed     10/15 Stable     Creatinine improving, however still with mild RACHELE, IV fluids given, patient refused admission or renal consult, or repeat labs, will DC home with close follow-up this week with oncologist to repeat labs, strict return precautions provided

## 2022-10-16 NOTE — PT/OT/SLP PROGRESS
Physical Therapy  Treatment    Lavelle Ladd   MRN: [id number]   Admitting Diagnosis: Acute osteomyelitis of left calcaneus       PT Start Time: 1050     PT Stop Time: 1115    PT Total Time (min): 25 min       Billable Minutes:  Therapeutic Activity 25    Treatment Type: Treatment  PT/PTA: PTA     PTA Visit Number: 1       General Precautions: Standard, fall  Orthopedic Precautions: N/A   Braces: Knee immobilizer    Spiritual, Cultural Beliefs, Restoration Practices, Values that Affect Care: no    Subjective:  Communicated with VICTORINA prior to session.      Pain/Comfort  Pain Rating 1: 0/10  Pain Rating Post-Intervention 1: 0/10    Objective:   Patient found with: bowel management system    Therapeutic Activities and Exercises:    PT AGREES TO T/F TO BS CHAIR    BED MOB SBA    CHAIR POSITIONED FOR HIM TO SCOOT BED-->CHAIR BACKWARD WITH MIN A X 2. ONCE IN CHAIR HE REQUESTED TO RETURN TO BED AND DEMONSTRATED UNSAFE SITTING POSTURE BY HIM LEANING ON BS TABLE. PTA DETERMINED THIS IT WAS UNSAFE FOR PT TO STAY IN CHAIR WITHOUT ANYONE ELSE IN THE ROOM WITH HIM. PT THEN T/F'D CHAIR-->BED SCOOTING FORWARD WITH MIN A X 2. BOTH TO AND FROM CHAIR WITH VC FOR UPPER EXTREMITY PLACEMENT / TECHNIQUE.     PT OPTED TO LAY PRONE IN BED AND BECAME UPSET WHEN PTA MOTIONED TO LEAVE HIS ROOM LEAVING HIM LAYING PRONE IN BED. PT STATES THAT HE NEEDS ASSISTANCE TO ROLL OVER. IT WAS EXPLAINED TO PT THAT BASED ON HIS T/F SKILLS, HE DID NOT NEED ASSISTANCE TO LAY IN HIS POSITION OF CHOICE. AFTER PT ROLLED SUPINE WITHOUT ASSISTANCE, HE BECAME UPSET ONCE AGAIN AT PTA BECAUSE HE WAS TANGLED IN HIS SHEETS. PT REQUIRED ENCOURAGEMENT TO MAKE HIMSELF COMFORTABLE WITHOUT PTA INTERVENING.      AM-PAC 6 CLICK MOBILITY  How much help from another person does this patient currently need?   1 = Unable, Total/Dependent Assistance  2 = A lot, Maximum/Moderate Assistance  3 = A little, Minimum/Contact Guard/Supervision  4 = None, Modified  [place]/Independent    Turning over in bed (including adjusting bedclothes, sheets and blankets)?: 4  Sitting down on and standing up from a chair with arms (e.g., wheelchair, bedside commode, etc.): 1  Moving from lying on back to sitting on the side of the bed?: 4  Moving to and from a bed to a chair (including a wheelchair)?: 3  Need to walk in hospital room?: 1  Climbing 3-5 steps with a railing?: 1  Basic Mobility Total Score: 14    AM-PAC Raw Score CMS G-Code Modifier Level of Impairment Assistance   6 % Total / Unable   7 - 9 CM 80 - 100% Maximal Assist   10 - 14 CL 60 - 80% Moderate Assist   15 - 19 CK 40 - 60% Moderate Assist   20 - 22 CJ 20 - 40% Minimal Assist   23 CI 1-20% SBA / CGA   24 CH 0% Independent/ Mod I     Patient left supine with all lines intact, call button in reach, and NSG notified.    Assessment:  Lavelle Ladd is a 67 y.o. male with a medical diagnosis of Acute osteomyelitis of left calcaneus and presents with .    Rehab identified problem list/impairments: Rehab identified problem list/impairments: weakness, gait instability, impaired endurance, decreased lower extremity function, impaired joint extensibility, decreased safety awareness, impaired self care skills, impaired functional mobility    Rehab potential is poor.    Activity tolerance: Fair    Discharge recommendations: Discharge Facility/Level of Care Needs: nursing facility, skilled     Barriers to discharge:      Equipment recommendations: Equipment Needed After Discharge: bedside commode, wheelchair, walker, rolling     GOALS:   Multidisciplinary Problems       Physical Therapy Goals          Problem: Physical Therapy    Goal Priority Disciplines Outcome Goal Variances Interventions   Physical Therapy Goal     PT, PT/OT Ongoing, Progressing     Description: Pt will perform bed mobility independently in order to participate in EOB activity.  Pt will perform transfers SBA with sliding board in order to  participate in OOB activity.   Pt will perform w/c mobility with SUP in order to participate in daily tasks.    Pt will tolerate sitting OOB x 3-4 hrs to participate in daily tasks.                       PLAN:    Patient to be seen 3 x/week  to address the above listed problems via gait training, therapeutic activities, therapeutic exercises  Plan of Care expires: 10/21/22  Plan of Care reviewed with: patient         10/16/2022

## 2022-10-16 NOTE — SUBJECTIVE & OBJECTIVE
Interval History: Pt was seen and examined. Labs and meds reviewed. Discussed with other providers. No new c/o's. Pt was advised not to refuse his meds, including the kidney transplant meds, risk of rejection explained.    Review of patient's allergies indicates:  No Known Allergies  Current Facility-Administered Medications   Medication Frequency    0.9%  NaCl infusion (for blood administration) Q24H PRN    acetaminophen tablet 650 mg Q4H PRN    albuterol-ipratropium 2.5 mg-0.5 mg/3 mL nebulizer solution 3 mL Q6H PRN    alteplase injection 2 mg Once    aluminum-magnesium hydroxide-simethicone 200-200-20 mg/5 mL suspension 30 mL QID PRN    ascorbic acid (vitamin C) tablet 500 mg QHS    aspirin EC tablet 81 mg Daily    cholestyramine 4 gram packet 4 g BID    dextrose 10% bolus 125 mL PRN    dextrose 10% bolus 250 mL PRN    famotidine tablet 20 mg Daily    glucagon (human recombinant) injection 1 mg PRN    glucose chewable tablet 16 g PRN    glucose chewable tablet 24 g PRN    haloperidol lactate injection 2 mg Q4H PRN    heparin (porcine) injection 5,000 Units Q12H    hydrALAZINE injection 10 mg Q4H PRN    HYDROcodone-acetaminophen 5-325 mg per tablet 1 tablet Q4H PRN    HYDROmorphone (PF) injection 1 mg Q4H PRN    HYDROmorphone tablet 2 mg Q3H PRN    influenza (QUADRIVALENT ADJUVANTED PF) vaccine 0.5 mL vaccine x 1 dose    insulin aspart U-100 pen 1-10 Units QID (AC + HS) PRN    insulin detemir U-100 pen 12 Units BID    Lactobacillus acidoph-L.bulgar 1 million cell tablet 4 tablet TID WM    melatonin tablet 6 mg Nightly PRN    miconazole nitrate 2% ointment BID    morphine injection 3 mg Q1H PRN    multivitamin tablet Daily    naloxone 0.4 mg/mL injection 0.02 mg PRN    ondansetron disintegrating tablet 4 mg Q6H PRN    ondansetron injection 4 mg Q8H    prochlorperazine injection Soln 5 mg Q6H PRN    sertraline tablet 25 mg Daily    simethicone chewable tablet 160 mg TID PRN    sodium chloride 0.9% flush 10 mL Q8H  PRN    tacrolimus capsule 1 mg BID       Objective:     Vital Signs (Most Recent):  Temp: 97.9 °F (36.6 °C) (10/16/22 1236)  Pulse: 82 (10/16/22 1236)  Resp: 18 (10/16/22 1236)  BP: (!) 158/80 (10/16/22 1236)  SpO2: 96 % (10/16/22 1236)  O2 Device (Oxygen Therapy): room air (10/13/22 2000)   Vital Signs (24h Range):  Temp:  [97.5 °F (36.4 °C)-98.1 °F (36.7 °C)] 97.9 °F (36.6 °C)  Pulse:  [] 82  Resp:  [] 18  SpO2:  [94 %-97 %] 96 %  BP: (141-176)/(70-89) 158/80     Weight: 96.5 kg (212 lb 11.9 oz) (10/14/22 2006)  Body mass index is 29.67 kg/m².  Body surface area is 2.2 meters squared.    I/O last 3 completed shifts:  In: 480 [P.O.:480]  Out: 1700 [Urine:1700]    Physical Exam  Vitals and nursing note reviewed.   Constitutional:       Appearance: Normal appearance.   Cardiovascular:      Rate and Rhythm: Normal rate and regular rhythm.      Pulses: Normal pulses.      Heart sounds: Normal heart sounds.   Pulmonary:      Effort: Pulmonary effort is normal.      Breath sounds: Normal breath sounds. No rales.   Neurological:      Mental Status: He is alert and oriented to person, place, and time.   Psychiatric:         Behavior: Behavior normal.       Significant Labs: reviewed  BMP  Lab Results   Component Value Date     10/16/2022    K 3.8 10/16/2022     10/16/2022    CO2 22 (L) 10/16/2022    BUN 20 10/16/2022    CREATININE 1.4 10/16/2022    CALCIUM 8.7 10/16/2022    ANIONGAP 15 10/16/2022    ESTGFRAFRICA 47 (A) 08/15/2021    EGFRNONAA 41 (A) 08/15/2021     Lab Results   Component Value Date    WBC 7.73 10/16/2022    HGB 8.9 (L) 10/16/2022    HCT 29.0 (L) 10/16/2022    MCV 90 10/16/2022     10/16/2022

## 2022-10-16 NOTE — PLAN OF CARE
PT AGREES TO T/F TO BS CHAIR    BED MOB SBA    CHAIR POSITIONED FOR HIM TO SCOOT BED-->CHAIR BACKWARD WITH MIN A X 2. ONCE IN CHAIR HE REQUESTED TO RETURN TO BED AND DEMONSTRATED UNSAFE SITTING POSTURE BY HIM LEANING ON BS TABLE. PTA DETERMINED THIS IT WAS UNSAFE FOR PT TO STAY IN CHAIR WITHOUT ANYONE ELSE IN THE ROOM WITH HIM. PT THEN T/F'D CHAIR-->BED SCOOTING FORWARD WITH MIN A X 2. BOTH TO AND FROM CHAIR WITH [facility] FOR UPPER EXTREMITY PLACEMENT / TECHNIQUE.     PT OPTED TO LAY PRONE IN BED AND BECAME UPSET WHEN PTA MOTIONED TO LEAVE HIS ROOM LEAVING HIM LAYING PRONE IN BED. PT STATES THAT HE NEEDS ASSISTANCE TO ROLL OVER. IT WAS EXPLAINED TO PT THAT BASED ON HIS T/F SKILLS, HE DID NOT NEED ASSISTANCE TO LAY IN HIS POSITION OF CHOICE. AFTER PT ROLLED SUPINE WITHOUT ASSISTANCE, HE BECAME UPSET ONCE AGAIN AT PTA BECAUSE HE WAS TANGLED IN HIS SHEETS. PT REQUIRED ENCOURAGEMENT TO MAKE HIMSELF COMFORTABLE WITHOUT PTA INTERVENING.

## 2022-10-16 NOTE — PLAN OF CARE
Care plan reviewed with patient. Pain managed by PRN med. Able to turn in the bed. Rectal tube no output as of this time.  Free from falls. No other complaints made.

## 2022-10-16 NOTE — ASSESSMENT & PLAN NOTE
9/14/22: c/o of left ankle pain-controlled. Pt was scheduled for surgery, however, surgery was post-poned 2/2 hyponatremia -Na 126. WBC normal, afebrile. Blood cultures show NGTD. .3. cont IV Abx  09/15/22- removal of the external fixator and debridement of osteomyelitis planned for today- procedure postponed due to hyponatremia- Lasix given - Nephrology following   9/16/22 - procedure postponed due to hyperglycemia  9/17/22 - post op day 1 - ABX in progress  9/19/22 - post op day 3 - Cefepime continued, Zyvox stopped. NWB. Wound care consulted for wound vac changes  9/20/22 - bone cultures pending. Aerobic culture isolated proteus mirabils  9/21/22 - Proteus mirabilis and B. faecis - Unasyn  9/22/22 - 6 weeks of Rocephin and Flagyl per Dr. Veliz  9/24/22 Vascular surgery was unable to complete the angiogram yesterday d/t refusing it. Will await further recs by Vascular surgery. Continue treatment with IV ABX  9/25/22 Ortho is recommending a BKA per vascular surgery. Awaiting Vascular surgery recs. Continue current management with IV ABX.   9/26/22 The patient is alert and oriented today. He reports that he does not remember refusing the angiogram on 9/23/22. He reports that he is agreeable to the procedure. Vascular Surgery was reconsulted today. Ortho is following. Continue IV ABX. Wound vac is in place.   9/27/22 Vascular surgery reconsulted and plans to do angiogram on 9/29/22 to evaluate for reperfusion vs amputation. Ortho is following.   9/28/22 Vascular surgery plans for a LLE angiogram in AM. NPO after MN. Ortho following   9/29/22 The patients renal function improved with IVF's. Plans for angiogram of LLE today with Vascular surgery to determine if revascularization is possible or if the patient will need a BKA. Will consult PT/OT for recs to assist with placement.   10/1/22 patient febrile ON, septic workup ordered  10/2: BC: NGTD, Afebrile overnight  10/4- BKA planned for 10-6, CM working on  placement following BKA  10/6: Left BKA completed    10/7:    Afebrile, no leukocytosis, status post BKA, received antibiotics for total duration of 25 days- discussed with ID --> considering BKA Dr. Veliz recommended to discontinue antibiotics.     10/8- s/p L BKA    Doing better but has phantom pain    10/12- BKA stump healing well. Denies any phantom pain  Palliative added small dose cymbalta    10/15 Stable, Off Abx  10/16 - S/P BKA
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
## 2022-10-16 NOTE — ASSESSMENT & PLAN NOTE
Creatinine improved  Awaiting angio- completed  Continue tacrolimus- weekly level      10/7:    Noted to have decreased urine output, creatinine trending up, nephrology notified, follow-up on recommendations.      10/8- worsening renal function with decreased UOP and Acidosis- heading towards HD    10/9- persists, BP marginally better, continue to watch, Renal following  Start Solucortef    Overall a little better but cr increased to 4, does not HD at present and does not want HD at present    Cr increased to 4.5- try IV lasix, d/c IVF    10/12- good response to lasix, good urine output, Cr 4.4  Pt does not want any HD    10/13- improving, good urine output, Cr down to 3.6    Cr 2.6- good diuresis- good recovery    10/16 Stable - creatinine stable 1.4
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
## 2022-10-16 NOTE — PROGRESS NOTES
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    Cr increased to 4.5- try IV lasix, d/c IVF    10/12- good response to lasix, good urine output, Cr 4.4  Pt does not want any HD    10/13- improving, good urine output, Cr down to 3.6    Cr 2.6- good diuresis- good recovery    10/16 Stable - creatinine stable 1.4    Essential hypertension  Managed with coreg    --Hold HTN medications at this time    BP good    10/15 Stable      VTE Risk Mitigation (From admission, onward)         Ordered     heparin (porcine) injection 5,000 Units  Every 12 hours         10/08/22 0911     heparin (porcine) injection 5,000 Units  Every 8 hours         22 1104     Reason for No Pharmacological VTE Prophylaxis  Once        Question:  Reasons:  Answer:  Risk of Bleeding    22     IP VTE HIGH RISK PATIENT  Once         22                Discharge Planning   LISETH:      Code Status: DNR   Is the patient medically ready for discharge?:     Reason for patient still in hospital (select all that apply): Patient trending condition and Pending disposition  Discharge Plan A: Skilled Nursing Facility   Discharge Delays: (!) Patient and Family Barriers              Indira Perry NP  Department of Hospital Medicine   [facility] - Med Surg

## 2022-10-16 NOTE — ASSESSMENT & PLAN NOTE
Patient's FSGs are uncontrolled due to hyperglycemia on current medication regimen.  Last A1c reviewed-   Lab Results   Component Value Date    HGBA1C 7.4 (H) 08/04/2022     Most recent fingerstick glucose reviewed-   Recent Labs   Lab 10/16/22  0629 10/16/22  1235   POCTGLUCOSE 133* 135*     Current correctional scale  Low  Maintain anti-hyperglycemic dose as follows-   Antihyperglycemics (From admission, onward)    Start     Stop Route Frequency Ordered    10/10/22 0900  insulin detemir U-100 pen 12 Units         -- SubQ 2 times daily 10/10/22 0745    10/07/22 1919  insulin aspart U-100 pen 1-10 Units         -- SubQ Before meals & nightly PRN 10/07/22 1819        Levemir added- changed to bid dosing and moderate sliding scale - dose increased from 16 units to 30 Units  Hold Oral hypoglycemics while patient is in the hospital.  9/24/22 The patient had a rapid response called over night. His blood glucose dropped to <20. He was given an amp of D50 and he returned to baseline.     10/15 Blood sugars running
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
## 2022-10-17 NOTE — NURSING
PICC line removed, applied pressure-no complications noted. Tele monitor removed and returned to the monitor room. Awaiting pickup to [place].
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
## 2022-10-17 NOTE — PT/OT/SLP PROGRESS
"Physical Therapy  Treatment    Lavelle Ladd   MRN: [id number]   Admitting Diagnosis: Acute osteomyelitis of left calcaneus    PT Received On: 10/17/22  PT Start Time: 1035     PT Stop Time: 1058    PT Total Time (min): 23 min       Billable Minutes:  Therapeutic Activity 15 and Therapeutic Exercise 8    Treatment Type: Treatment  PT/PTA: PTA     PTA Visit Number: 2       General Precautions: Standard, fall  Orthopedic Precautions: N/A   Braces: Knee immobilizer  Respiratory Status: Room air    Spiritual, Cultural Beliefs, Confucianist Practices, Values that Affect Care: no    Subjective:  Communicated with nursing staff, Cat and completed Epic chart review prior to session.  Patient agreeable to PT.    Pain/Comfort  Pain Rating 1: 7/10  Location - Side 1:  (buttocks)  Location - Orientation 1: generalized  Location 1: perirectal  Pain Addressed 1: Reposition, Distraction, Nurse notified  Pain Rating Post-Intervention 1: 7/10    Objective:   Patient found with: bowel management system.  Patient presents sitting upright in bed, unaccompanied.    Functional Mobility:  Bed Mobility:    Rolling L/R: SBA for patient safety  Supine<>sit: SBA for patient safety    Transfers:   Not performed    Gait:    Not appropriate at this time.    Therapeutic Activities and Exercises:  Gentle stretch applied to hip ADD, flexors, and extensors while patient in  L sidelying.  Pt educated on the following: Pt verbally agreed in understanding.   Prosthesis sleeve/sock/stocking donned at all times to promote proper shaping for prosthesis  Continue therapuetic exercises throughout the day to increase activity tolerance and decrease risk for pneumonia and blood clots.  "Call, don't fall" procedure of pressing red button on call bell for all transfers.        AM-PAC 6 CLICK MOBILITY  How much help from another person does this patient currently need?   1 = Unable, Total/Dependent Assistance  2 = A lot, Maximum/Moderate Assistance  3 = A little, " Minimum/Contact Guard/Supervision  4 = None, Modified [place]/Independent    Turning over in bed (including adjusting bedclothes, sheets and blankets)?: 4  Sitting down on and standing up from a chair with arms (e.g., wheelchair, bedside commode, etc.): 1  Moving from lying on back to sitting on the side of the bed?: 4  Moving to and from a bed to a chair (including a wheelchair)?: 3  Need to walk in hospital room?: 1  Climbing 3-5 steps with a railing?: 1  Basic Mobility Total Score: 14    AM-PAC Raw Score CMS G-Code Modifier Level of Impairment Assistance   6 % Total / Unable   7 - 9 CM 80 - 100% Maximal Assist   10 - 14 CL 60 - 80% Moderate Assist   15 - 19 CK 40 - 60% Moderate Assist   20 - 22 CJ 20 - 40% Minimal Assist   23 CI 1-20% SBA / CGA   24 CH 0% Independent/ Mod I     Patient left HOB elevated with all lines intact, call button in reach, bed alarm on, and nursing  notified.    Assessment:  Lavelle Ladd is a 67 y.o. male with a medical diagnosis of Acute osteomyelitis of left calcaneus and presents with overall decline in functional mobility. Patient tolerated therapeutic intervention fair this date despite pain experienced. Patient able to perform bed mobility tasks with SBA and actively complete therapeutic exercises. Therapist educated patient on importance of donned prosthesis sleeve/sock/stocking for proper shaping for future prosthesis; patient receptive. Will continue to progress toward goals per patient tolerance per PT plan of care.    Rehab identified problem list/impairments: Rehab identified problem list/impairments: weakness, gait instability, impaired endurance, decreased lower extremity function, impaired joint extensibility, decreased safety awareness, impaired self care skills, impaired functional mobility, pain    Rehab potential is fair.    Activity tolerance: Fair    Discharge recommendations: Discharge Facility/Level of Care Needs: nursing facility, skilled      Barriers to discharge:      Equipment recommendations: Equipment Needed After Discharge: bedside commode, wheelchair, walker, rolling     GOALS:   Multidisciplinary Problems       Physical Therapy Goals          Problem: Physical Therapy    Goal Priority Disciplines Outcome Goal Variances Interventions   Physical Therapy Goal     PT, PT/OT Ongoing, Progressing     Description: Pt will perform bed mobility independently in order to participate in EOB activity.  Pt will perform transfers SBA with sliding board in order to participate in OOB activity.   Pt will perform w/c mobility with SUP in order to participate in daily tasks.    Pt will tolerate sitting OOB x 3-4 hrs to participate in daily tasks.                       PLAN:    Patient to be seen 3 x/week  to address the above listed problems via therapeutic activities, therapeutic exercises  Plan of Care expires: 10/21/22  Plan of Care reviewed with: patient         10/17/2022
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
## 2022-10-17 NOTE — SUBJECTIVE & OBJECTIVE
Interval History: Pt was seen and examined. Labs and meds reviewed. Discussed with other providers. No new events, no overall change.    Review of patient's allergies indicates:  No Known Allergies  Current Facility-Administered Medications   Medication Frequency    0.9%  NaCl infusion (for blood administration) Q24H PRN    acetaminophen tablet 650 mg Q4H PRN    albuterol-ipratropium 2.5 mg-0.5 mg/3 mL nebulizer solution 3 mL Q6H PRN    alteplase injection 2 mg Once    aluminum-magnesium hydroxide-simethicone 200-200-20 mg/5 mL suspension 30 mL QID PRN    ascorbic acid (vitamin C) tablet 500 mg QHS    aspirin EC tablet 81 mg Daily    cholestyramine 4 gram packet 4 g BID    dextrose 10% bolus 125 mL PRN    dextrose 10% bolus 250 mL PRN    famotidine tablet 20 mg BID    glucagon (human recombinant) injection 1 mg PRN    glucose chewable tablet 16 g PRN    glucose chewable tablet 24 g PRN    haloperidol lactate injection 2 mg Q4H PRN    heparin (porcine) injection 5,000 Units Q12H    hydrALAZINE injection 10 mg Q4H PRN    HYDROcodone-acetaminophen 5-325 mg per tablet 1 tablet Q4H PRN    HYDROmorphone (PF) injection 1 mg Q4H PRN    HYDROmorphone tablet 2 mg Q3H PRN    influenza (QUADRIVALENT ADJUVANTED PF) vaccine 0.5 mL vaccine x 1 dose    insulin aspart U-100 pen 1-10 Units QID (AC + HS) PRN    insulin detemir U-100 pen 12 Units BID    Lactobacillus acidoph-L.bulgar 1 million cell tablet 4 tablet TID WM    melatonin tablet 6 mg Nightly PRN    miconazole nitrate 2% ointment BID    morphine injection 3 mg Q1H PRN    multivitamin tablet Daily    naloxone 0.4 mg/mL injection 0.02 mg PRN    ondansetron disintegrating tablet 4 mg Q6H PRN    ondansetron injection 4 mg Q8H    prochlorperazine injection Soln 5 mg Q6H PRN    sertraline tablet 25 mg Daily    simethicone chewable tablet 160 mg TID PRN    sodium chloride 0.9% flush 10 mL Q8H PRN    tacrolimus capsule 1 mg BID       Objective:     Vital Signs (Most Recent):  Temp:  98.2 °F (36.8 °C) (10/17/22 1146)  Pulse: 77 (10/17/22 1146)  Resp: 16 (10/17/22 1146)  BP: 133/63 (10/17/22 1146)  SpO2: (!) 92 % (10/17/22 1146)  O2 Device (Oxygen Therapy): room air (10/17/22 0740)   Vital Signs (24h Range):  Temp:  [97.4 °F (36.3 °C)-98.4 °F (36.9 °C)] 98.2 °F (36.8 °C)  Pulse:  [73-93] 77  Resp:  [15-20] 16  SpO2:  [92 %-96 %] 92 %  BP: (129-158)/(60-80) 133/63     Weight: 96.5 kg (212 lb 11.9 oz) (10/14/22 2006)  Body mass index is 29.67 kg/m².  Body surface area is 2.2 meters squared.    I/O last 3 completed shifts:  In: 890 [P.O.:890]  Out: 2450 [Urine:2450]    Physical Exam  Vitals and nursing note reviewed.   Constitutional:       Appearance: Normal appearance.   Cardiovascular:      Rate and Rhythm: Normal rate and regular rhythm.   Pulmonary:      Effort: Pulmonary effort is normal.      Breath sounds: Normal breath sounds. No rales.   Neurological:      Mental Status: He is alert and oriented to person, place, and time.   Psychiatric:         Behavior: Behavior normal.       Significant Labs: reviewed  BMP  Lab Results   Component Value Date     10/17/2022    K 3.8 10/17/2022     10/17/2022    CO2 25 10/17/2022    BUN 14 10/17/2022    CREATININE 1.2 10/17/2022    CALCIUM 8.5 (L) 10/17/2022    ANIONGAP 11 10/17/2022    ESTGFRAFRICA 47 (A) 08/15/2021    EGFRNONAA 41 (A) 08/15/2021     Prograf level 4.0    Lab Results   Component Value Date    WBC 6.72 10/17/2022    HGB 9.2 (L) 10/17/2022    HCT 30.2 (L) 10/17/2022    MCV 89 10/17/2022     10/17/2022

## 2022-11-07 NOTE — TELEPHONE ENCOUNTER
"  Reason for Disposition   Difficulty breathing    Answer Assessment - Initial Assessment Questions  1. LOCATION: "Where does it hurt?"        Left side of chest, sharp intermittent pain  2. RADIATION: "Does the pain go anywhere else?" (e.g., into neck, jaw, arms, back)      No  3. ONSET: "When did the chest pain begin?" (Minutes, hours or days)       today  4. PATTERN "Does the pain come and go, or has it been constant since it started?"  "Does it get worse with exertion?"       Comes and goes  5. DURATION: "How long does it last" (e.g., seconds, minutes, hours)      Sharp shooting pain that last about a sec then goes away. It comes about every 15-30 min.  6. SEVERITY: "How bad is the pain?"  (e.g., Scale 1-10; mild, moderate, or severe)     - MILD (1-3): doesn't interfere with normal activities      - MODERATE (4-7): interferes with normal activities or awakens from sleep     - SEVERE (8-10): excruciating pain, unable to do any normal activities        3-4/10 when it comes  7. CARDIAC RISK FACTORS: "Do you have any history of heart problems or risk factors for heart disease?" (e.g., prior heart attack, angina; high blood pressure, diabetes, being overweight, high cholesterol, smoking, or strong family history of heart disease)      No  8. PULMONARY RISK FACTORS: "Do you have any history of lung disease?"  (e.g., blood clots in lung, asthma, emphysema, birth control pills)      No  9. CAUSE: "What do you think is causing the chest pain?"      Narcotic withdrawals. Norco and methadone both stopped about 5 days ago.  10. OTHER SYMPTOMS: "Do you have any other symptoms?" (e.g., dizziness, nausea, vomiting, sweating, fever, difficulty breathing, cough)        SOB - moderate mostly when walking but sometimes even when laying still, sweating  11. PREGNANCY: "Is there any chance you are pregnant?" "When was your last menstrual period?"        n/a    Protocols used: ST CHEST PAIN-A-    "
Post Kidney Transplant 5/21/16    BPA 16    Spoke to Dr Badillo to notify him that patient is on his way to ED. Dr states patient should go to closest ED to him. Called patient back to verify that he is going to closest ED to him. Patient states he is going to [facility] ED in [place]. Please contact caller directly to discuss any further care advice.  
no

## 2022-11-13 NOTE — ED PROVIDER NOTES
SCRIBE #1 NOTE: I, Kwesi Roverto Mosher and My Abel, am scribing for, and in the presence of, Brian Loyola MD. I have scribed the entire note.       History     Chief Complaint   Patient presents with    Fall     Pt fell and ripped staples from recent knee amputation.     Review of patient's allergies indicates:  No Known Allergies      History of Present Illness     HPI    2022, 7:15 PM  History obtained from the patient      History of Present Illness: Lavelle Ladd is a 67 y.o. male patient with a PMHx of CAD, CHF, coronary artery disease who presents to the Emergency Department for evaluation of a fall which onset PTA. Pt reports falling down and ripping his surgical staples post left BKA. The pt is requesting a staple removal.  Pt reports pain to his left knee. Symptoms are constant and moderate in severity. No mitigating or exacerbating factors reported. Patient denies any fever, chills, CP, N/V/D, SOB, back pain, and all other sxs at this time. No further complaints or concerns at this time.       Arrival mode: Ambulance Services    PCP: Primary Doctor No        Past Medical History:  Past Medical History:   Diagnosis Date    DINORAH (acute kidney injury) 2016    Arthritis     CAD in native artery 2019    CHF (congestive heart failure)     Chronic obstructive pulmonary disease 2016    Coronary artery disease involving native coronary artery of native heart without angina pectoris 2016    CRI (chronic renal insufficiency) 2019     donor kidney transplant for DM 16     Induction with Thymo x3 and IV solumedrol to total 875mg  Kidney Biopsy  2016: 16 glomeruli, ACR type 1 AVR type 2, significant microcirculatory changes, c4d negative, No DSA, 5 to10% fibrosis. Treated with thymo x8 2016- no rejection      Diastolic heart failure     Encounter for blood transfusion     ESRD on RRT since 10/2013 10/29/2013    Biopsy proven diabetic nephropathy and  lymphoplasmacytic interstitial infiltrate not c/w with AIN (ddx sjogrens or assoc with tamm-horsefall protein extravasation)     GERD (gastroesophageal reflux disease)     History of hepatitis C, s/p successful Rx w/ SVR12 - 4/2017 4/5/2017    Completed 12 weeks harvoni w/ SVR    Hyperlipidemia     Hypertension     PAD (peripheral artery disease) 7/21/2019    PIC line (peripherally inserted central catheter) flush     Prophylactic immunotherapy     Proteinuria     PVD (peripheral vascular disease) 6/26/2017    RLE BKA CT 12/11/16 Extensive atherosclerotic disease of the aorta and mesenteric arteries.     Renal hypertension     Type 2 diabetes mellitus with diabetic neuropathy, with long-term current use of insulin 12/1/2016    Vitamin B12 deficiency        Past Surgical History:  Past Surgical History:   Procedure Laterality Date    ANGIOGRAPHY OF LOWER EXTREMITY N/A 9/23/2022    Procedure: ANGIOGRAM, LOWER EXTREMITY/left leg;  Surgeon: Donal Mcdonald MD;  Location: [facility] CATH LAB;  Service: Cardiovascular;  Laterality: N/A;    ANGIOGRAPHY OF LOWER EXTREMITY N/A 9/29/2022    Procedure: ANGIOGRAM, LOWER EXTREMITY/left leg;  Surgeon: Donal Mcdonald MD;  Location: [facility] CATH LAB;  Service: Peripheral Vascular;  Laterality: N/A;  resched from 9/23 pt ready now    AORTOGRAPHY WITH SERIALOGRAPHY N/A 6/14/2018    Procedure: LEFT LEG ANGIOGRAM;  Surgeon: Donal Mcdonald MD;  Location: [facility] CATH LAB;  Service: Vascular;  Laterality: N/A;    APPLICATION OF LARGE EXTERNAL FIXATION DEVICE TO TIBIA Left 8/4/2022    Procedure: APPLICATION, EXTERNAL FIXATION DEVICE, LARGE, TIBIA;  Surgeon: Good Villa MD;  Location: [facility] OR;  Service: Orthopedics;  Laterality: Left;    av bovine graft      Left UE    AV FISTULA PLACEMENT      left UE    BELOW KNEE AMPUTATION OF LOWER EXTREMITY Left 10/6/2022    Procedure: AMPUTATION, BELOW KNEE;  Surgeon: Donal Mcdonald MD;  Location: [facility] OR;  Service: Vascular;  Laterality: Left;    CARDIAC  CATHETERIZATION  02/2015    CLOSED REDUCTION OF INJURY OF TIBIA Left 8/4/2022    Procedure: CLOSED REDUCTION, TIBIA;  Surgeon: Good Villa MD;  Location: [facility] [place];  Service: Orthopedics;  Laterality: Left;  Closed reduction left tibial and fibular shaft fractures    CLOSURE OF WOUND Left 9/24/2018    Procedure: CLOSURE, WOUND;  Surgeon: Karla Wheeler DPM;  Location: [facility];  Service: Podiatry;  Laterality: Left;  Secondary Wound closure, extensive    CLOSURE OF WOUND Left 11/5/2018    Procedure: CLOSURE, WOUND;  Surgeon: Karla Wheeler DPM;  Location: [facility];  Service: Podiatry;  Laterality: Left;    DEBRIDEMENT OF MULTIPLE METATARSAL BONES Left 11/5/2018    Procedure: DEBRIDEMENT, METATARSAL BONE, 2 OR MORE BONES;  Surgeon: Karla Wheeler DPM;  Location: [facility];  Service: Podiatry;  Laterality: Left;    EXCISION OF SKIN Left 9/27/2019    Procedure: EXCISION, SKIN;  Surgeon: Lenard Alarcon MD;  Location: [address];  Service: Plastics;  Laterality: Left;  Plastics set, NIMS monitor, ACell    FOOT AMPUTATION THROUGH METATARSAL Left 9/21/2018    Procedure: AMPUTATION, FOOT, TRANSMETATARSAL;  Surgeon: Karla Wheeler DPM;  Location: [facility];  Service: Podiatry;  Laterality: Left;    FOOT AMPUTATION THROUGH METATARSAL Left 10/31/2018    Procedure: AMPUTATION, FOOT, TRANSMETATARSAL;  Surgeon: Karla Wheeler DPM;  Location: [facility];  Service: Podiatry;  Laterality: Left;    FOOT AMPUTATION THROUGH METATARSAL Left 11/5/2018    Procedure: AMPUTATION, FOOT, TRANSMETATARSAL;  Surgeon: Karla Wheeler DPM;  Location: [facility];  Service: Podiatry;  Laterality: Left;  revisional transmetatarsal amputation, Left foot    IRRIGATION AND DEBRIDEMENT OF LOWER EXTREMITY Left 10/31/2018    Procedure: IRRIGATION AND DEBRIDEMENT, LOWER EXTREMITY;  Surgeon: Karla Wheeler DPM;  Location: [facility];  Service: Podiatry;  Laterality: Left;    KIDNEY TRANSPLANT  05/21/2016    LEFT HEART  CATHETERIZATION Left 2019    Procedure: CATHETERIZATION, HEART, LEFT;  Surgeon: Andrew Valdez MD;  Location: [facility] C[facility] LAB;  Service: Cardiology;  Laterality: Left;    LEG AMPUTATION THROUGH KNEE      right LE, started as nail puncture leading to diabetic ulcer    REMOVAL OF EXTERNAL FIXATION DEVICE Left 2022    Procedure: REMOVAL, EXTERNAL FIXATION DEVICE;  Surgeon: Kathleen Zazueta MD;  Location: [facility] OR;  Service: Orthopedics;  Laterality: Left;    SKIN FULL THICKNESS GRAFT Left 10/7/2019    Procedure: APPLICATION, GRAFT, SKIN, FULL-THICKNESS;  Surgeon: Lenard Alarcon MD;  Location: [address];  Service: Plastics;  Laterality: Left;    SURGICAL REMOVAL OF LESION OF FACE Right 10/7/2019    Procedure: EXCISION, LESION, FACE;  Surgeon: Lenard Alarcon MD;  [facility];  Service: Plastics;  Laterality: Right;         Family History:  Family History   Problem Relation Age of Onset    Cancer Father     Diabetes Father     Heart failure Father     Stroke Father     Heart failure Mother     Kidney disease Neg Hx        Social History:  Social History     Tobacco Use    Smoking status: Former     Packs/day: 1.00     Years: 40.00     Pack years: 40.00     Types: Cigarettes     Quit date: 2013     Years since quittin.8    Smokeless tobacco: Never   Substance and Sexual Activity    Alcohol use: Yes     Alcohol/week: 6.0 standard drinks     Types: 6 Cans of beer per week     Comment: seldom    Drug use: No    Sexual activity: Never        Review of Systems     Review of Systems   Constitutional:  Negative for chills and fever.   HENT:  Negative for sore throat.    Respiratory:  Negative for shortness of breath.    Cardiovascular:  Negative for chest pain.   Gastrointestinal:  Negative for diarrhea, nausea and vomiting.   Genitourinary:  Negative for dysuria.   Musculoskeletal:  Positive for arthralgias (left knee). Negative for back pain.   Skin:  Negative for rash.   Neurological:   Negative for weakness.   Hematological:  Does not bruise/bleed easily.   All other systems reviewed and are negative.     Physical Exam     Initial Vitals   BP Pulse Resp Temp SpO2   11/12/22 1812 11/12/22 1812 11/12/22 1812 11/12/22 2002 11/12/22 1812   100/61 66 18 97.8 °F (36.6 °C) 98 %      MAP       --                 Physical Exam  Nursing Notes and Vital Signs Reviewed.  Constitutional: Patient is in no acute distress. Well-developed and well-nourished.  Head: Atraumatic. Normocephalic.  Eyes: EOM intact. Conjunctivae are not pale. No scleral icterus.  ENT: Mucous membranes are moist. Oropharynx is clear and symmetric.    Neck: Supple. Full ROM.   Cardiovascular: Regular rate. Regular rhythm. No murmurs, rubs, or gallops. Distal pulses are 2+ and symmetric.  Pulmonary/Chest: No respiratory distress. Clear to auscultation bilaterally. No wheezing or rales.  Abdominal: Soft and non-distended.  There is no tenderness.  No rebound, guarding, or rigidity.   Musculoskeletal: Moves all extremities. No obvious deformities. No edema. Bilateral BKA.  Skin: Warm and dry. Clean, dry intact with staples fixed, not loose. No sign of infection. No erythema or exudate. No active bleeding.   Neurological:  Alert, awake, and appropriate.  Normal speech.  No acute focal neurological deficits are appreciated.  Psychiatric: Normal affect. Good eye contact. Appropriate in content.     ED Course   Procedures  ED Vital Signs:  Vitals:    11/12/22 1812 11/12/22 1902 11/12/22 2002 11/12/22 2035   BP: 100/61 (!) 103/50 (!) 115/59    Pulse: 66 65 67    Resp: 18 15 17    Temp:   97.8 °F (36.6 °C)    TempSrc:   Oral    SpO2: 98% 95% 99%    Weight:    88 kg (194 lb)    11/12/22 2147   BP: (!) 116/57   Pulse: 67   Resp: 15   Temp:    TempSrc:    SpO2: 99%   Weight:        Abnormal Lab Results:  Labs Reviewed - No data to display         Imaging Results:  Imaging Results    None              The Emergency Provider reviewed the vital signs  and test results, which are outlined above.     ED Discussion       7:23 PM: Reassessed pt at this time.  Discussed with pt all pertinent ED information and results. Discussed pt dx and plan of tx. Gave pt all f/u and return to the ED instructions. All questions and concerns were addressed at this time. Pt expresses understanding of information and instructions, and is comfortable with plan to discharge. Pt is stable for discharge. Pt is recommended to meet with vascular surgeon who completed his knee amputation to get his staples removed.    I discussed with patient and/or family/caretaker that evaluation in the ED does not suggest any emergent or life threatening medical conditions requiring immediate intervention beyond what was provided in the ED, and I believe patient is safe for discharge.  Regardless, an unremarkable evaluation in the ED does not preclude the development or presence of a serious of life threatening condition. As such, patient was instructed to return immediately for any worsening or change in current symptoms.                  ED Medication(s):  Medications - No data to display    New Prescriptions    No medications on file        Follow-up Information       Donal Mcdonald MD. Schedule an appointment as soon as possible for a visit in 2 days.    Specialty: Vascular Surgery  Why: For staple removal, For wound re-check  Contact information:  [address] [address] [phone]               Schedule an appointment as soon as possible for a visit  with with pcp.               [facility] - Emergency Dept..    Specialty: Emergency Medicine  Why: As needed, If symptoms worsen  Contact information:  [address] [facility] [address] [phone]                               Scribe Attestation:   Scribe #1: I performed the above scribed service and the documentation accurately describes the services I performed. I attest to the accuracy of the note.      Attending:   Physician Attestation Statement for Scribe #1: I, Brian Loyola MD, personally performed the services described in this documentation, as scribed by Kwesi Lawton and My Abel, in my presence, and it is both accurate and complete.           Clinical Impression       ICD-10-CM ICD-9-CM   1. History of left below knee amputation  Z89.512 V49.75       Disposition:   Disposition: Discharged  Condition: Stable      Brian Loyola MD  11/12/22 0845

## 2022-12-05 PROBLEM — J96.01 ACUTE HYPOXEMIC RESPIRATORY FAILURE DUE TO COVID-19: Status: RESOLVED | Noted: 2022-01-01 | Resolved: 2022-01-01

## 2022-12-05 PROBLEM — U07.1 ACUTE HYPOXEMIC RESPIRATORY FAILURE DUE TO COVID-19: Status: RESOLVED | Noted: 2022-01-01 | Resolved: 2022-01-01

## 2023-01-01 ENCOUNTER — HOSPITAL ENCOUNTER (INPATIENT)
Facility: HOSPITAL | Age: 68
LOS: 4 days | DRG: 564 | End: 2023-04-24
Attending: EMERGENCY MEDICINE | Admitting: STUDENT IN AN ORGANIZED HEALTH CARE EDUCATION/TRAINING PROGRAM
Payer: MEDICARE

## 2023-01-01 ENCOUNTER — TELEPHONE (OUTPATIENT)
Dept: NEPHROLOGY | Facility: CLINIC | Age: 68
End: 2023-01-01
Payer: MEDICARE

## 2023-01-01 ENCOUNTER — PES CALL (OUTPATIENT)
Dept: ADMINISTRATIVE | Facility: OTHER | Age: 68
End: 2023-01-01
Payer: MEDICARE

## 2023-01-01 ENCOUNTER — PATIENT OUTREACH (OUTPATIENT)
Dept: ADMINISTRATIVE | Facility: HOSPITAL | Age: 68
End: 2023-01-01
Payer: MEDICARE

## 2023-01-01 ENCOUNTER — HOSPITAL ENCOUNTER (INPATIENT)
Facility: HOSPITAL | Age: 68
LOS: 8 days | Discharge: HOME HEALTH | DRG: 564 | End: 2023-04-13
Attending: EMERGENCY MEDICINE | Admitting: INTERNAL MEDICINE
Payer: MEDICARE

## 2023-01-01 VITALS
WEIGHT: 181.69 LBS | OXYGEN SATURATION: 96 % | SYSTOLIC BLOOD PRESSURE: 113 MMHG | HEART RATE: 78 BPM | HEIGHT: 71 IN | RESPIRATION RATE: 16 BRPM | TEMPERATURE: 99 F | BODY MASS INDEX: 25.44 KG/M2 | DIASTOLIC BLOOD PRESSURE: 53 MMHG

## 2023-01-01 DIAGNOSIS — I73.9 PERIPHERAL VASCULAR DISEASE: ICD-10-CM

## 2023-01-01 DIAGNOSIS — L97.422 DIABETIC ULCER OF LEFT MIDFOOT ASSOCIATED WITH TYPE 2 DIABETES MELLITUS, WITH FAT LAYER EXPOSED: ICD-10-CM

## 2023-01-01 DIAGNOSIS — N17.9 AKI (ACUTE KIDNEY INJURY): ICD-10-CM

## 2023-01-01 DIAGNOSIS — R07.9 CHEST PAIN: ICD-10-CM

## 2023-01-01 DIAGNOSIS — I46.9 CARDIAC ARREST: ICD-10-CM

## 2023-01-01 DIAGNOSIS — R78.81 BACTEREMIA: ICD-10-CM

## 2023-01-01 DIAGNOSIS — R65.21 SEPTIC SHOCK: ICD-10-CM

## 2023-01-01 DIAGNOSIS — A41.9 SEPTIC SHOCK: ICD-10-CM

## 2023-01-01 DIAGNOSIS — Z94.0 RENAL TRANSPLANT, STATUS POST: ICD-10-CM

## 2023-01-01 DIAGNOSIS — E11.621 DIABETIC ULCER OF LEFT MIDFOOT ASSOCIATED WITH TYPE 2 DIABETES MELLITUS, WITH FAT LAYER EXPOSED: ICD-10-CM

## 2023-01-01 DIAGNOSIS — I25.10 CAD IN NATIVE ARTERY: ICD-10-CM

## 2023-01-01 DIAGNOSIS — L97.916: Primary | ICD-10-CM

## 2023-01-01 DIAGNOSIS — B99.9 INFECTION: ICD-10-CM

## 2023-01-01 DIAGNOSIS — Z45.2 ENCOUNTER FOR CENTRAL LINE PLACEMENT: ICD-10-CM

## 2023-01-01 DIAGNOSIS — I48.91 NEW ONSET A-FIB: ICD-10-CM

## 2023-01-01 DIAGNOSIS — R50.9 FEVER: ICD-10-CM

## 2023-01-01 DIAGNOSIS — A41.9 SEPSIS, DUE TO UNSPECIFIED ORGANISM, UNSPECIFIED WHETHER ACUTE ORGAN DYSFUNCTION PRESENT: Primary | ICD-10-CM

## 2023-01-01 DIAGNOSIS — I12.9 RENAL HYPERTENSION: Primary | Chronic | ICD-10-CM

## 2023-01-01 DIAGNOSIS — Z89.511 HX OF RIGHT BKA: ICD-10-CM

## 2023-01-01 DIAGNOSIS — B49 FUNGEMIA: ICD-10-CM

## 2023-01-01 DIAGNOSIS — M79.604 RIGHT LEG PAIN: ICD-10-CM

## 2023-01-01 DIAGNOSIS — T87.40 INFECTION OF AMPUTATION STUMP: ICD-10-CM

## 2023-01-01 LAB
25(OH)D3+25(OH)D2 SERPL-MCNC: 18 NG/ML (ref 30–96)
ACINETOBACTER CALCOACETICUS/BAUMANNII COMPLEX: NOT DETECTED
ACINETOBACTER CALCOACETICUS/BAUMANNII COMPLEX: NOT DETECTED
ALBUMIN SERPL BCP-MCNC: 2.2 G/DL (ref 3.5–5.2)
ALBUMIN SERPL BCP-MCNC: 2.3 G/DL (ref 3.5–5.2)
ALBUMIN SERPL BCP-MCNC: 2.4 G/DL (ref 3.5–5.2)
ALBUMIN SERPL BCP-MCNC: 2.4 G/DL (ref 3.5–5.2)
ALBUMIN SERPL BCP-MCNC: 2.5 G/DL (ref 3.5–5.2)
ALBUMIN SERPL BCP-MCNC: 2.6 G/DL (ref 3.5–5.2)
ALP SERPL-CCNC: 103 U/L (ref 55–135)
ALP SERPL-CCNC: 114 U/L (ref 55–135)
ALP SERPL-CCNC: 155 U/L (ref 55–135)
ALP SERPL-CCNC: 164 U/L (ref 55–135)
ALT SERPL W/O P-5'-P-CCNC: 18 U/L (ref 10–44)
ALT SERPL W/O P-5'-P-CCNC: 22 U/L (ref 10–44)
ALT SERPL W/O P-5'-P-CCNC: 22 U/L (ref 10–44)
ALT SERPL W/O P-5'-P-CCNC: 31 U/L (ref 10–44)
ANION GAP SERPL CALC-SCNC: 10 MMOL/L (ref 8–16)
ANION GAP SERPL CALC-SCNC: 11 MMOL/L (ref 8–16)
ANION GAP SERPL CALC-SCNC: 12 MMOL/L (ref 8–16)
ANION GAP SERPL CALC-SCNC: 12 MMOL/L (ref 8–16)
ANION GAP SERPL CALC-SCNC: 13 MMOL/L (ref 8–16)
ANION GAP SERPL CALC-SCNC: 6 MMOL/L (ref 8–16)
ANION GAP SERPL CALC-SCNC: 6 MMOL/L (ref 8–16)
ANION GAP SERPL CALC-SCNC: 7 MMOL/L (ref 8–16)
ANION GAP SERPL CALC-SCNC: 8 MMOL/L (ref 8–16)
ANION GAP SERPL CALC-SCNC: 9 MMOL/L (ref 8–16)
ANISOCYTOSIS BLD QL SMEAR: ABNORMAL
ANISOCYTOSIS BLD QL SMEAR: SLIGHT
ANISOCYTOSIS BLD QL SMEAR: SLIGHT
AST SERPL-CCNC: 30 U/L (ref 10–40)
AST SERPL-CCNC: 32 U/L (ref 10–40)
AST SERPL-CCNC: 46 U/L (ref 10–40)
AST SERPL-CCNC: 53 U/L (ref 10–40)
BACTERIA #/AREA URNS HPF: ABNORMAL /HPF
BACTERIA BLD CULT: ABNORMAL
BACTERIA BLD CULT: NORMAL
BACTERIA SPEC AEROBE CULT: ABNORMAL
BACTERIA SPEC ANAEROBE CULT: ABNORMAL
BACTERIA SPEC ANAEROBE CULT: NORMAL
BACTERIA SPEC ANAEROBE CULT: NORMAL
BACTERIA UR CULT: NO GROWTH
BACTERIA UR CULT: NORMAL
BACTEROIDES FRAGILIS: NOT DETECTED
BACTEROIDES FRAGILIS: NOT DETECTED
BASOPHILS # BLD AUTO: 0.01 K/UL (ref 0–0.2)
BASOPHILS # BLD AUTO: 0.02 K/UL (ref 0–0.2)
BASOPHILS # BLD AUTO: 0.03 K/UL (ref 0–0.2)
BASOPHILS # BLD AUTO: 0.04 K/UL (ref 0–0.2)
BASOPHILS # BLD AUTO: 0.05 K/UL (ref 0–0.2)
BASOPHILS # BLD AUTO: 0.05 K/UL (ref 0–0.2)
BASOPHILS # BLD AUTO: 0.06 K/UL (ref 0–0.2)
BASOPHILS # BLD AUTO: 0.06 K/UL (ref 0–0.2)
BASOPHILS NFR BLD: 0 % (ref 0–1.9)
BASOPHILS NFR BLD: 0 % (ref 0–1.9)
BASOPHILS NFR BLD: 0.1 % (ref 0–1.9)
BASOPHILS NFR BLD: 0.2 % (ref 0–1.9)
BASOPHILS NFR BLD: 0.3 % (ref 0–1.9)
BASOPHILS NFR BLD: 0.4 % (ref 0–1.9)
BASOPHILS NFR BLD: 0.5 % (ref 0–1.9)
BASOPHILS NFR BLD: 0.6 % (ref 0–1.9)
BASOPHILS NFR BLD: 0.8 % (ref 0–1.9)
BASOPHILS NFR BLD: 0.9 % (ref 0–1.9)
BASOPHILS NFR BLD: 0.9 % (ref 0–1.9)
BILIRUB SERPL-MCNC: 0.1 MG/DL (ref 0.1–1)
BILIRUB SERPL-MCNC: 0.2 MG/DL (ref 0.1–1)
BILIRUB SERPL-MCNC: 0.2 MG/DL (ref 0.1–1)
BILIRUB SERPL-MCNC: 0.4 MG/DL (ref 0.1–1)
BILIRUB UR QL STRIP: NEGATIVE
BNP SERPL-MCNC: 135 PG/ML (ref 0–99)
BNP SERPL-MCNC: 84 PG/ML (ref 0–99)
BUN SERPL-MCNC: 19 MG/DL (ref 8–23)
BUN SERPL-MCNC: 19 MG/DL (ref 8–23)
BUN SERPL-MCNC: 20 MG/DL (ref 8–23)
BUN SERPL-MCNC: 21 MG/DL (ref 8–23)
BUN SERPL-MCNC: 21 MG/DL (ref 8–23)
BUN SERPL-MCNC: 22 MG/DL (ref 8–23)
BUN SERPL-MCNC: 24 MG/DL (ref 8–23)
BUN SERPL-MCNC: 25 MG/DL (ref 8–23)
BUN SERPL-MCNC: 31 MG/DL (ref 8–23)
BUN SERPL-MCNC: 34 MG/DL (ref 8–23)
BUN SERPL-MCNC: 34 MG/DL (ref 8–23)
BUN SERPL-MCNC: 35 MG/DL (ref 8–23)
BUN SERPL-MCNC: 36 MG/DL (ref 8–23)
BUN SERPL-MCNC: 37 MG/DL (ref 8–23)
BUN SERPL-MCNC: 39 MG/DL (ref 8–23)
BUN SERPL-MCNC: 39 MG/DL (ref 8–23)
BUN SERPL-MCNC: 40 MG/DL (ref 8–23)
BUN SERPL-MCNC: 43 MG/DL (ref 8–23)
BURR CELLS BLD QL SMEAR: ABNORMAL
BURR CELLS BLD QL SMEAR: ABNORMAL
C DIFF GDH STL QL: POSITIVE
C DIFF TOX A+B STL QL IA: NEGATIVE
C DIFF TOX GENS STL QL NAA+PROBE: POSITIVE
CALCIUM SERPL-MCNC: 7.1 MG/DL (ref 8.7–10.5)
CALCIUM SERPL-MCNC: 7.7 MG/DL (ref 8.7–10.5)
CALCIUM SERPL-MCNC: 8.2 MG/DL (ref 8.7–10.5)
CALCIUM SERPL-MCNC: 8.5 MG/DL (ref 8.7–10.5)
CALCIUM SERPL-MCNC: 8.6 MG/DL (ref 8.7–10.5)
CALCIUM SERPL-MCNC: 8.7 MG/DL (ref 8.7–10.5)
CALCIUM SERPL-MCNC: 8.8 MG/DL (ref 8.7–10.5)
CALCIUM SERPL-MCNC: 8.9 MG/DL (ref 8.7–10.5)
CALCIUM SERPL-MCNC: 9 MG/DL (ref 8.7–10.5)
CALCIUM SERPL-MCNC: 9.3 MG/DL (ref 8.7–10.5)
CALCIUM SERPL-MCNC: 9.5 MG/DL (ref 8.7–10.5)
CANDIDA ALBICANS: NOT DETECTED
CANDIDA ALBICANS: NOT DETECTED
CANDIDA AURIS: NOT DETECTED
CANDIDA AURIS: NOT DETECTED
CANDIDA GLABRATA: DETECTED
CANDIDA GLABRATA: NOT DETECTED
CANDIDA KRUSEI: NOT DETECTED
CANDIDA KRUSEI: NOT DETECTED
CANDIDA PARAPSILOSIS: NOT DETECTED
CANDIDA PARAPSILOSIS: NOT DETECTED
CANDIDA TROPICALIS: NOT DETECTED
CANDIDA TROPICALIS: NOT DETECTED
CHLORIDE SERPL-SCNC: 100 MMOL/L (ref 95–110)
CHLORIDE SERPL-SCNC: 101 MMOL/L (ref 95–110)
CHLORIDE SERPL-SCNC: 102 MMOL/L (ref 95–110)
CHLORIDE SERPL-SCNC: 104 MMOL/L (ref 95–110)
CHLORIDE SERPL-SCNC: 105 MMOL/L (ref 95–110)
CHLORIDE SERPL-SCNC: 105 MMOL/L (ref 95–110)
CHLORIDE SERPL-SCNC: 106 MMOL/L (ref 95–110)
CHLORIDE SERPL-SCNC: 107 MMOL/L (ref 95–110)
CHLORIDE SERPL-SCNC: 107 MMOL/L (ref 95–110)
CHLORIDE SERPL-SCNC: 108 MMOL/L (ref 95–110)
CHLORIDE SERPL-SCNC: 108 MMOL/L (ref 95–110)
CHLORIDE SERPL-SCNC: 109 MMOL/L (ref 95–110)
CHLORIDE SERPL-SCNC: 111 MMOL/L (ref 95–110)
CHLORIDE SERPL-SCNC: 112 MMOL/L (ref 95–110)
CHLORIDE SERPL-SCNC: 113 MMOL/L (ref 95–110)
CLARITY UR: ABNORMAL
CO2 SERPL-SCNC: 13 MMOL/L (ref 23–29)
CO2 SERPL-SCNC: 14 MMOL/L (ref 23–29)
CO2 SERPL-SCNC: 15 MMOL/L (ref 23–29)
CO2 SERPL-SCNC: 15 MMOL/L (ref 23–29)
CO2 SERPL-SCNC: 16 MMOL/L (ref 23–29)
CO2 SERPL-SCNC: 17 MMOL/L (ref 23–29)
CO2 SERPL-SCNC: 18 MMOL/L (ref 23–29)
CO2 SERPL-SCNC: 18 MMOL/L (ref 23–29)
CO2 SERPL-SCNC: 19 MMOL/L (ref 23–29)
CO2 SERPL-SCNC: 19 MMOL/L (ref 23–29)
CO2 SERPL-SCNC: 20 MMOL/L (ref 23–29)
CO2 SERPL-SCNC: 21 MMOL/L (ref 23–29)
CO2 SERPL-SCNC: 21 MMOL/L (ref 23–29)
CO2 SERPL-SCNC: 22 MMOL/L (ref 23–29)
COLOR UR: YELLOW
CREAT SERPL-MCNC: 1.6 MG/DL (ref 0.5–1.4)
CREAT SERPL-MCNC: 1.7 MG/DL (ref 0.5–1.4)
CREAT SERPL-MCNC: 1.8 MG/DL (ref 0.5–1.4)
CREAT SERPL-MCNC: 1.8 MG/DL (ref 0.5–1.4)
CREAT SERPL-MCNC: 1.9 MG/DL (ref 0.5–1.4)
CREAT SERPL-MCNC: 1.9 MG/DL (ref 0.5–1.4)
CREAT SERPL-MCNC: 2 MG/DL (ref 0.5–1.4)
CREAT SERPL-MCNC: 2.1 MG/DL (ref 0.5–1.4)
CREAT SERPL-MCNC: 2.2 MG/DL (ref 0.5–1.4)
CREAT SERPL-MCNC: 2.3 MG/DL (ref 0.5–1.4)
CREAT SERPL-MCNC: 2.3 MG/DL (ref 0.5–1.4)
CREAT SERPL-MCNC: 2.4 MG/DL (ref 0.5–1.4)
CREAT SERPL-MCNC: 2.4 MG/DL (ref 0.5–1.4)
CREAT SERPL-MCNC: 2.5 MG/DL (ref 0.5–1.4)
CREAT SERPL-MCNC: 2.5 MG/DL (ref 0.5–1.4)
CREAT SERPL-MCNC: 2.6 MG/DL (ref 0.5–1.4)
CREAT UR-MCNC: 45 MG/DL (ref 23–375)
CREAT UR-MCNC: 62.6 MG/DL (ref 23–375)
CRYPTOCOCCUS NEOFORMANS/GATTII: NOT DETECTED
CRYPTOCOCCUS NEOFORMANS/GATTII: NOT DETECTED
CTP QC/QA: YES
CTP QC/QA: YES
CTX-M GENE: ABNORMAL
CTX-M GENE: NOT DETECTED
DACRYOCYTES BLD QL SMEAR: ABNORMAL
DIFFERENTIAL METHOD: ABNORMAL
ENTEROBACTER CLOACAE COMPLEX: NOT DETECTED
ENTEROBACTER CLOACAE COMPLEX: NOT DETECTED
ENTEROBACTERALES: ABNORMAL
ENTEROBACTERALES: NOT DETECTED
ENTEROCOCCUS FAECALIS: DETECTED
ENTEROCOCCUS FAECALIS: NOT DETECTED
ENTEROCOCCUS FAECIUM: NOT DETECTED
ENTEROCOCCUS FAECIUM: NOT DETECTED
EOSINOPHIL # BLD AUTO: 0 K/UL (ref 0–0.5)
EOSINOPHIL # BLD AUTO: 0.1 K/UL (ref 0–0.5)
EOSINOPHIL # BLD AUTO: 0.2 K/UL (ref 0–0.5)
EOSINOPHIL # BLD AUTO: 0.3 K/UL (ref 0–0.5)
EOSINOPHIL NFR BLD: 0 % (ref 0–8)
EOSINOPHIL NFR BLD: 0 % (ref 0–8)
EOSINOPHIL NFR BLD: 0.2 % (ref 0–8)
EOSINOPHIL NFR BLD: 1 % (ref 0–8)
EOSINOPHIL NFR BLD: 1.6 % (ref 0–8)
EOSINOPHIL NFR BLD: 1.9 % (ref 0–8)
EOSINOPHIL NFR BLD: 2 % (ref 0–8)
EOSINOPHIL NFR BLD: 2 % (ref 0–8)
EOSINOPHIL NFR BLD: 2.1 % (ref 0–8)
EOSINOPHIL NFR BLD: 2.1 % (ref 0–8)
EOSINOPHIL NFR BLD: 2.8 % (ref 0–8)
EOSINOPHIL NFR BLD: 2.8 % (ref 0–8)
EOSINOPHIL NFR BLD: 3.4 % (ref 0–8)
EOSINOPHIL NFR BLD: 4.5 % (ref 0–8)
EOSINOPHIL NFR BLD: 4.7 % (ref 0–8)
EOSINOPHIL NFR BLD: 4.8 % (ref 0–8)
EOSINOPHIL NFR BLD: 4.9 % (ref 0–8)
ERYTHROCYTE [DISTWIDTH] IN BLOOD BY AUTOMATED COUNT: 14.7 % (ref 11.5–14.5)
ERYTHROCYTE [DISTWIDTH] IN BLOOD BY AUTOMATED COUNT: 14.8 % (ref 11.5–14.5)
ERYTHROCYTE [DISTWIDTH] IN BLOOD BY AUTOMATED COUNT: 14.9 % (ref 11.5–14.5)
ERYTHROCYTE [DISTWIDTH] IN BLOOD BY AUTOMATED COUNT: 15.1 % (ref 11.5–14.5)
ERYTHROCYTE [DISTWIDTH] IN BLOOD BY AUTOMATED COUNT: 15.2 % (ref 11.5–14.5)
ERYTHROCYTE [DISTWIDTH] IN BLOOD BY AUTOMATED COUNT: 15.3 % (ref 11.5–14.5)
ERYTHROCYTE [DISTWIDTH] IN BLOOD BY AUTOMATED COUNT: 15.4 % (ref 11.5–14.5)
ERYTHROCYTE [DISTWIDTH] IN BLOOD BY AUTOMATED COUNT: 15.6 % (ref 11.5–14.5)
ERYTHROCYTE [DISTWIDTH] IN BLOOD BY AUTOMATED COUNT: 16.1 % (ref 11.5–14.5)
ERYTHROCYTE [DISTWIDTH] IN BLOOD BY AUTOMATED COUNT: 17.1 % (ref 11.5–14.5)
ERYTHROCYTE [DISTWIDTH] IN BLOOD BY AUTOMATED COUNT: 17.5 % (ref 11.5–14.5)
ESCHERICHIA COLI: NOT DETECTED
ESCHERICHIA COLI: NOT DETECTED
EST. GFR  (NO RACE VARIABLE): 26 ML/MIN/1.73 M^2
EST. GFR  (NO RACE VARIABLE): 27 ML/MIN/1.73 M^2
EST. GFR  (NO RACE VARIABLE): 27 ML/MIN/1.73 M^2
EST. GFR  (NO RACE VARIABLE): 28 ML/MIN/1.73 M^2
EST. GFR  (NO RACE VARIABLE): 28 ML/MIN/1.73 M^2
EST. GFR  (NO RACE VARIABLE): 30 ML/MIN/1.73 M^2
EST. GFR  (NO RACE VARIABLE): 30 ML/MIN/1.73 M^2
EST. GFR  (NO RACE VARIABLE): 32 ML/MIN/1.73 M^2
EST. GFR  (NO RACE VARIABLE): 33 ML/MIN/1.73 M^2
EST. GFR  (NO RACE VARIABLE): 35 ML/MIN/1.73 M^2
EST. GFR  (NO RACE VARIABLE): 38 ML/MIN/1.73 M^2
EST. GFR  (NO RACE VARIABLE): 38 ML/MIN/1.73 M^2
EST. GFR  (NO RACE VARIABLE): 40 ML/MIN/1.73 M^2
EST. GFR  (NO RACE VARIABLE): 40 ML/MIN/1.73 M^2
EST. GFR  (NO RACE VARIABLE): 43 ML/MIN/1.73 M^2
EST. GFR  (NO RACE VARIABLE): 46 ML/MIN/1.73 M^2
ESTIMATED AVG GLUCOSE: 146 MG/DL (ref 68–131)
FERRITIN SERPL-MCNC: 37 NG/ML (ref 20–300)
FOLATE SERPL-MCNC: 12.3 NG/ML (ref 4–24)
GIANT PLATELETS BLD QL SMEAR: PRESENT
GLUCOSE SERPL-MCNC: 100 MG/DL (ref 70–110)
GLUCOSE SERPL-MCNC: 103 MG/DL (ref 70–110)
GLUCOSE SERPL-MCNC: 113 MG/DL (ref 70–110)
GLUCOSE SERPL-MCNC: 119 MG/DL (ref 70–110)
GLUCOSE SERPL-MCNC: 120 MG/DL (ref 70–110)
GLUCOSE SERPL-MCNC: 128 MG/DL (ref 70–110)
GLUCOSE SERPL-MCNC: 129 MG/DL (ref 70–110)
GLUCOSE SERPL-MCNC: 135 MG/DL (ref 70–110)
GLUCOSE SERPL-MCNC: 140 MG/DL (ref 70–110)
GLUCOSE SERPL-MCNC: 141 MG/DL (ref 70–110)
GLUCOSE SERPL-MCNC: 143 MG/DL (ref 70–110)
GLUCOSE SERPL-MCNC: 151 MG/DL (ref 70–110)
GLUCOSE SERPL-MCNC: 168 MG/DL (ref 70–110)
GLUCOSE SERPL-MCNC: 177 MG/DL (ref 70–110)
GLUCOSE SERPL-MCNC: 67 MG/DL (ref 70–110)
GLUCOSE SERPL-MCNC: 86 MG/DL (ref 70–110)
GLUCOSE SERPL-MCNC: 92 MG/DL (ref 70–110)
GLUCOSE SERPL-MCNC: 94 MG/DL (ref 70–110)
GLUCOSE UR QL STRIP: NEGATIVE
HAEMOPHILUS INFLUENZAE: NOT DETECTED
HAEMOPHILUS INFLUENZAE: NOT DETECTED
HBA1C MFR BLD: 6.7 % (ref 4–5.6)
HCT VFR BLD AUTO: 27.1 % (ref 40–54)
HCT VFR BLD AUTO: 27.8 % (ref 40–54)
HCT VFR BLD AUTO: 27.8 % (ref 40–54)
HCT VFR BLD AUTO: 27.9 % (ref 40–54)
HCT VFR BLD AUTO: 28.5 % (ref 40–54)
HCT VFR BLD AUTO: 28.6 % (ref 40–54)
HCT VFR BLD AUTO: 28.6 % (ref 40–54)
HCT VFR BLD AUTO: 28.7 % (ref 40–54)
HCT VFR BLD AUTO: 28.7 % (ref 40–54)
HCT VFR BLD AUTO: 29.5 % (ref 40–54)
HCT VFR BLD AUTO: 29.7 % (ref 40–54)
HCT VFR BLD AUTO: 29.9 % (ref 40–54)
HCT VFR BLD AUTO: 29.9 % (ref 40–54)
HCT VFR BLD AUTO: 30.1 % (ref 40–54)
HCT VFR BLD AUTO: 32.1 % (ref 40–54)
HCT VFR BLD AUTO: 33.4 % (ref 40–54)
HCT VFR BLD AUTO: 45.6 % (ref 40–54)
HGB BLD-MCNC: 10 G/DL (ref 14–18)
HGB BLD-MCNC: 13.4 G/DL (ref 14–18)
HGB BLD-MCNC: 8.2 G/DL (ref 14–18)
HGB BLD-MCNC: 8.4 G/DL (ref 14–18)
HGB BLD-MCNC: 8.5 G/DL (ref 14–18)
HGB BLD-MCNC: 8.5 G/DL (ref 14–18)
HGB BLD-MCNC: 8.6 G/DL (ref 14–18)
HGB BLD-MCNC: 8.6 G/DL (ref 14–18)
HGB BLD-MCNC: 8.7 G/DL (ref 14–18)
HGB BLD-MCNC: 8.8 G/DL (ref 14–18)
HGB BLD-MCNC: 9 G/DL (ref 14–18)
HGB BLD-MCNC: 9.1 G/DL (ref 14–18)
HGB BLD-MCNC: 9.6 G/DL (ref 14–18)
HGB UR QL STRIP: ABNORMAL
HYALINE CASTS #/AREA URNS LPF: 0 /LPF
IMM GRANULOCYTES # BLD AUTO: 0.01 K/UL (ref 0–0.04)
IMM GRANULOCYTES # BLD AUTO: 0.02 K/UL (ref 0–0.04)
IMM GRANULOCYTES # BLD AUTO: 0.03 K/UL (ref 0–0.04)
IMM GRANULOCYTES # BLD AUTO: 0.05 K/UL (ref 0–0.04)
IMM GRANULOCYTES # BLD AUTO: 0.06 K/UL (ref 0–0.04)
IMM GRANULOCYTES # BLD AUTO: 0.07 K/UL (ref 0–0.04)
IMM GRANULOCYTES # BLD AUTO: 0.08 K/UL (ref 0–0.04)
IMM GRANULOCYTES # BLD AUTO: ABNORMAL K/UL (ref 0–0.04)
IMM GRANULOCYTES # BLD AUTO: ABNORMAL K/UL (ref 0–0.04)
IMM GRANULOCYTES NFR BLD AUTO: 0.3 % (ref 0–0.5)
IMM GRANULOCYTES NFR BLD AUTO: 0.3 % (ref 0–0.5)
IMM GRANULOCYTES NFR BLD AUTO: 0.4 % (ref 0–0.5)
IMM GRANULOCYTES NFR BLD AUTO: 0.5 % (ref 0–0.5)
IMM GRANULOCYTES NFR BLD AUTO: 0.6 % (ref 0–0.5)
IMM GRANULOCYTES NFR BLD AUTO: 0.7 % (ref 0–0.5)
IMM GRANULOCYTES NFR BLD AUTO: 1.1 % (ref 0–0.5)
IMM GRANULOCYTES NFR BLD AUTO: ABNORMAL % (ref 0–0.5)
IMM GRANULOCYTES NFR BLD AUTO: ABNORMAL % (ref 0–0.5)
IMP GENE: ABNORMAL
IMP GENE: NOT DETECTED
IRON SERPL-MCNC: 13 UG/DL (ref 45–160)
KETONES UR QL STRIP: NEGATIVE
KLEBSIELLA AEROGENES: NOT DETECTED
KLEBSIELLA AEROGENES: NOT DETECTED
KLEBSIELLA OXYTOCA: NOT DETECTED
KLEBSIELLA OXYTOCA: NOT DETECTED
KLEBSIELLA PNEUMONIAE GROUP: NOT DETECTED
KLEBSIELLA PNEUMONIAE GROUP: NOT DETECTED
KPC: ABNORMAL
KPC: NOT DETECTED
LACTATE SERPL-SCNC: 0.5 MMOL/L (ref 0.5–2.2)
LACTATE SERPL-SCNC: 0.6 MMOL/L (ref 0.5–2.2)
LACTATE SERPL-SCNC: 0.7 MMOL/L (ref 0.5–2.2)
LACTATE SERPL-SCNC: 1.5 MMOL/L (ref 0.5–2.2)
LACTATE SERPL-SCNC: 2.3 MMOL/L (ref 0.5–2.2)
LACTATE SERPL-SCNC: 2.7 MMOL/L (ref 0.5–2.2)
LACTATE SERPL-SCNC: 3.5 MMOL/L (ref 0.5–2.2)
LEUKOCYTE ESTERASE UR QL STRIP: ABNORMAL
LISTERIA MONOCYTOGENES: NOT DETECTED
LISTERIA MONOCYTOGENES: NOT DETECTED
LYMPHOCYTES # BLD AUTO: 0.5 K/UL (ref 1–4.8)
LYMPHOCYTES # BLD AUTO: 0.8 K/UL (ref 1–4.8)
LYMPHOCYTES # BLD AUTO: 1.1 K/UL (ref 1–4.8)
LYMPHOCYTES # BLD AUTO: 1.2 K/UL (ref 1–4.8)
LYMPHOCYTES # BLD AUTO: 1.5 K/UL (ref 1–4.8)
LYMPHOCYTES # BLD AUTO: 1.7 K/UL (ref 1–4.8)
LYMPHOCYTES # BLD AUTO: 1.8 K/UL (ref 1–4.8)
LYMPHOCYTES # BLD AUTO: 1.9 K/UL (ref 1–4.8)
LYMPHOCYTES # BLD AUTO: 2 K/UL (ref 1–4.8)
LYMPHOCYTES # BLD AUTO: 2.1 K/UL (ref 1–4.8)
LYMPHOCYTES # BLD AUTO: 2.1 K/UL (ref 1–4.8)
LYMPHOCYTES NFR BLD: 14.1 % (ref 18–48)
LYMPHOCYTES NFR BLD: 16.4 % (ref 18–48)
LYMPHOCYTES NFR BLD: 17.9 % (ref 18–48)
LYMPHOCYTES NFR BLD: 18.9 % (ref 18–48)
LYMPHOCYTES NFR BLD: 21.9 % (ref 18–48)
LYMPHOCYTES NFR BLD: 24.3 % (ref 18–48)
LYMPHOCYTES NFR BLD: 25.3 % (ref 18–48)
LYMPHOCYTES NFR BLD: 25.3 % (ref 18–48)
LYMPHOCYTES NFR BLD: 25.5 % (ref 18–48)
LYMPHOCYTES NFR BLD: 25.5 % (ref 18–48)
LYMPHOCYTES NFR BLD: 29.4 % (ref 18–48)
LYMPHOCYTES NFR BLD: 29.9 % (ref 18–48)
LYMPHOCYTES NFR BLD: 3 % (ref 18–48)
LYMPHOCYTES NFR BLD: 31.8 % (ref 18–48)
LYMPHOCYTES NFR BLD: 34.6 % (ref 18–48)
LYMPHOCYTES NFR BLD: 44 % (ref 18–48)
LYMPHOCYTES NFR BLD: 7.9 % (ref 18–48)
MAGNESIUM SERPL-MCNC: 1.2 MG/DL (ref 1.6–2.6)
MAGNESIUM SERPL-MCNC: 1.7 MG/DL (ref 1.6–2.6)
MAGNESIUM SERPL-MCNC: 1.7 MG/DL (ref 1.6–2.6)
MAGNESIUM SERPL-MCNC: 1.8 MG/DL (ref 1.6–2.6)
MAGNESIUM SERPL-MCNC: 1.8 MG/DL (ref 1.6–2.6)
MAGNESIUM SERPL-MCNC: 2.1 MG/DL (ref 1.6–2.6)
MAGNESIUM SERPL-MCNC: 2.1 MG/DL (ref 1.6–2.6)
MAGNESIUM SERPL-MCNC: 2.2 MG/DL (ref 1.6–2.6)
MCH RBC QN AUTO: 24 PG (ref 27–31)
MCH RBC QN AUTO: 24.3 PG (ref 27–31)
MCH RBC QN AUTO: 24.6 PG (ref 27–31)
MCH RBC QN AUTO: 24.8 PG (ref 27–31)
MCH RBC QN AUTO: 24.9 PG (ref 27–31)
MCH RBC QN AUTO: 24.9 PG (ref 27–31)
MCH RBC QN AUTO: 25 PG (ref 27–31)
MCH RBC QN AUTO: 25.1 PG (ref 27–31)
MCH RBC QN AUTO: 25.1 PG (ref 27–31)
MCH RBC QN AUTO: 25.2 PG (ref 27–31)
MCH RBC QN AUTO: 25.3 PG (ref 27–31)
MCH RBC QN AUTO: 25.8 PG (ref 27–31)
MCHC RBC AUTO-ENTMCNC: 28.7 G/DL (ref 32–36)
MCHC RBC AUTO-ENTMCNC: 29.4 G/DL (ref 32–36)
MCHC RBC AUTO-ENTMCNC: 29.6 G/DL (ref 32–36)
MCHC RBC AUTO-ENTMCNC: 29.8 G/DL (ref 32–36)
MCHC RBC AUTO-ENTMCNC: 29.9 G/DL (ref 32–36)
MCHC RBC AUTO-ENTMCNC: 30.1 G/DL (ref 32–36)
MCHC RBC AUTO-ENTMCNC: 30.1 G/DL (ref 32–36)
MCHC RBC AUTO-ENTMCNC: 30.2 G/DL (ref 32–36)
MCHC RBC AUTO-ENTMCNC: 30.2 G/DL (ref 32–36)
MCHC RBC AUTO-ENTMCNC: 30.3 G/DL (ref 32–36)
MCHC RBC AUTO-ENTMCNC: 30.3 G/DL (ref 32–36)
MCHC RBC AUTO-ENTMCNC: 30.4 G/DL (ref 32–36)
MCHC RBC AUTO-ENTMCNC: 30.6 G/DL (ref 32–36)
MCHC RBC AUTO-ENTMCNC: 31 G/DL (ref 32–36)
MCHC RBC AUTO-ENTMCNC: 31.2 G/DL (ref 32–36)
MCR-1: ABNORMAL
MCR-1: ABNORMAL
MCV RBC AUTO: 81 FL (ref 82–98)
MCV RBC AUTO: 81 FL (ref 82–98)
MCV RBC AUTO: 82 FL (ref 82–98)
MCV RBC AUTO: 83 FL (ref 82–98)
MCV RBC AUTO: 84 FL (ref 82–98)
MCV RBC AUTO: 85 FL (ref 82–98)
MCV RBC AUTO: 88 FL (ref 82–98)
MEC A/C AND MREJ (MRSA): ABNORMAL
MEC A/C AND MREJ (MRSA): ABNORMAL
MEC A/C: ABNORMAL
MEC A/C: ABNORMAL
MICROSCOPIC COMMENT: ABNORMAL
MONOCYTES # BLD AUTO: 0.2 K/UL (ref 0.3–1)
MONOCYTES # BLD AUTO: 0.2 K/UL (ref 0.3–1)
MONOCYTES # BLD AUTO: 0.6 K/UL (ref 0.3–1)
MONOCYTES # BLD AUTO: 0.7 K/UL (ref 0.3–1)
MONOCYTES # BLD AUTO: 0.7 K/UL (ref 0.3–1)
MONOCYTES # BLD AUTO: 0.8 K/UL (ref 0.3–1)
MONOCYTES # BLD AUTO: 0.9 K/UL (ref 0.3–1)
MONOCYTES # BLD AUTO: 1 K/UL (ref 0.3–1)
MONOCYTES # BLD AUTO: 1.1 K/UL (ref 0.3–1)
MONOCYTES # BLD AUTO: 1.9 K/UL (ref 0.3–1)
MONOCYTES # BLD AUTO: 1.9 K/UL (ref 0.3–1)
MONOCYTES NFR BLD: 1 % (ref 4–15)
MONOCYTES NFR BLD: 11 % (ref 4–15)
MONOCYTES NFR BLD: 11.4 % (ref 4–15)
MONOCYTES NFR BLD: 11.9 % (ref 4–15)
MONOCYTES NFR BLD: 12.2 % (ref 4–15)
MONOCYTES NFR BLD: 12.3 % (ref 4–15)
MONOCYTES NFR BLD: 12.9 % (ref 4–15)
MONOCYTES NFR BLD: 13.3 % (ref 4–15)
MONOCYTES NFR BLD: 13.4 % (ref 4–15)
MONOCYTES NFR BLD: 13.8 % (ref 4–15)
MONOCYTES NFR BLD: 14.1 % (ref 4–15)
MONOCYTES NFR BLD: 14.2 % (ref 4–15)
MONOCYTES NFR BLD: 16.9 % (ref 4–15)
MONOCYTES NFR BLD: 2.2 % (ref 4–15)
MONOCYTES NFR BLD: 7 % (ref 4–15)
MONOCYTES NFR BLD: 9.4 % (ref 4–15)
MONOCYTES NFR BLD: 9.9 % (ref 4–15)
MYCOPHENOLATE SERPL-MCNC: 0.8 MCG/ML (ref 1–3.5)
MYCOPHENOLATE-G SERPL-MCNC: 47 MCG/ML (ref 35–100)
NDM GENE: ABNORMAL
NDM GENE: NOT DETECTED
NEISSERIA MENINGITIDIS: NOT DETECTED
NEISSERIA MENINGITIDIS: NOT DETECTED
NEUTROPHILS # BLD AUTO: 1.3 K/UL (ref 1.8–7.7)
NEUTROPHILS # BLD AUTO: 2.6 K/UL (ref 1.8–7.7)
NEUTROPHILS # BLD AUTO: 2.6 K/UL (ref 1.8–7.7)
NEUTROPHILS # BLD AUTO: 2.8 K/UL (ref 1.8–7.7)
NEUTROPHILS # BLD AUTO: 3.1 K/UL (ref 1.8–7.7)
NEUTROPHILS # BLD AUTO: 3.2 K/UL (ref 1.8–7.7)
NEUTROPHILS # BLD AUTO: 3.9 K/UL (ref 1.8–7.7)
NEUTROPHILS # BLD AUTO: 4.1 K/UL (ref 1.8–7.7)
NEUTROPHILS # BLD AUTO: 4.2 K/UL (ref 1.8–7.7)
NEUTROPHILS # BLD AUTO: 4.4 K/UL (ref 1.8–7.7)
NEUTROPHILS # BLD AUTO: 5.9 K/UL (ref 1.8–7.7)
NEUTROPHILS # BLD AUTO: 6.9 K/UL (ref 1.8–7.7)
NEUTROPHILS # BLD AUTO: 7.2 K/UL (ref 1.8–7.7)
NEUTROPHILS # BLD AUTO: 9.4 K/UL (ref 1.8–7.7)
NEUTROPHILS # BLD AUTO: 9.6 K/UL (ref 1.8–7.7)
NEUTROPHILS NFR BLD: 47.6 % (ref 38–73)
NEUTROPHILS NFR BLD: 49 % (ref 38–73)
NEUTROPHILS NFR BLD: 50.3 % (ref 38–73)
NEUTROPHILS NFR BLD: 52.4 % (ref 38–73)
NEUTROPHILS NFR BLD: 52.8 % (ref 38–73)
NEUTROPHILS NFR BLD: 56.3 % (ref 38–73)
NEUTROPHILS NFR BLD: 58 % (ref 38–73)
NEUTROPHILS NFR BLD: 59 % (ref 38–73)
NEUTROPHILS NFR BLD: 59.1 % (ref 38–73)
NEUTROPHILS NFR BLD: 61.3 % (ref 38–73)
NEUTROPHILS NFR BLD: 61.9 % (ref 38–73)
NEUTROPHILS NFR BLD: 63.6 % (ref 38–73)
NEUTROPHILS NFR BLD: 65.1 % (ref 38–73)
NEUTROPHILS NFR BLD: 69 % (ref 38–73)
NEUTROPHILS NFR BLD: 69.7 % (ref 38–73)
NEUTROPHILS NFR BLD: 70.7 % (ref 38–73)
NEUTROPHILS NFR BLD: 88.9 % (ref 38–73)
NEUTS BAND NFR BLD MANUAL: 27 %
NITRITE UR QL STRIP: NEGATIVE
NRBC BLD-RTO: 0 /100 WBC
OSMOLALITY SERPL: 289 MOSM/KG (ref 280–300)
OSMOLALITY UR: 265 MOSM/KG (ref 50–1200)
OVALOCYTES BLD QL SMEAR: ABNORMAL
OVALOCYTES BLD QL SMEAR: ABNORMAL
OXA-48-LIKE: ABNORMAL
OXA-48-LIKE: NOT DETECTED
PH UR STRIP: 6 [PH] (ref 5–8)
PHOSPHATE SERPL-MCNC: 3.3 MG/DL (ref 2.7–4.5)
PHOSPHATE SERPL-MCNC: 3.4 MG/DL (ref 2.7–4.5)
PHOSPHATE SERPL-MCNC: 3.7 MG/DL (ref 2.7–4.5)
PHOSPHATE SERPL-MCNC: 3.9 MG/DL (ref 2.7–4.5)
PLATELET # BLD AUTO: 134 K/UL (ref 150–450)
PLATELET # BLD AUTO: 208 K/UL (ref 150–450)
PLATELET # BLD AUTO: 209 K/UL (ref 150–450)
PLATELET # BLD AUTO: 210 K/UL (ref 150–450)
PLATELET # BLD AUTO: 233 K/UL (ref 150–450)
PLATELET # BLD AUTO: 243 K/UL (ref 150–450)
PLATELET # BLD AUTO: 244 K/UL (ref 150–450)
PLATELET # BLD AUTO: 245 K/UL (ref 150–450)
PLATELET # BLD AUTO: 250 K/UL (ref 150–450)
PLATELET # BLD AUTO: 271 K/UL (ref 150–450)
PLATELET # BLD AUTO: 280 K/UL (ref 150–450)
PLATELET # BLD AUTO: 288 K/UL (ref 150–450)
PLATELET # BLD AUTO: 312 K/UL (ref 150–450)
PLATELET # BLD AUTO: 317 K/UL (ref 150–450)
PLATELET # BLD AUTO: 319 K/UL (ref 150–450)
PLATELET # BLD AUTO: 327 K/UL (ref 150–450)
PLATELET # BLD AUTO: 347 K/UL (ref 150–450)
PLATELET BLD QL SMEAR: ABNORMAL
PMV BLD AUTO: 10 FL (ref 9.2–12.9)
PMV BLD AUTO: 10.2 FL (ref 9.2–12.9)
PMV BLD AUTO: 10.2 FL (ref 9.2–12.9)
PMV BLD AUTO: 10.3 FL (ref 9.2–12.9)
PMV BLD AUTO: 10.3 FL (ref 9.2–12.9)
PMV BLD AUTO: 10.4 FL (ref 9.2–12.9)
PMV BLD AUTO: 10.4 FL (ref 9.2–12.9)
PMV BLD AUTO: 10.5 FL (ref 9.2–12.9)
PMV BLD AUTO: 9.4 FL (ref 9.2–12.9)
PMV BLD AUTO: 9.6 FL (ref 9.2–12.9)
PMV BLD AUTO: 9.6 FL (ref 9.2–12.9)
PMV BLD AUTO: 9.7 FL (ref 9.2–12.9)
PMV BLD AUTO: 9.8 FL (ref 9.2–12.9)
PMV BLD AUTO: 9.9 FL (ref 9.2–12.9)
POC MOLECULAR INFLUENZA A AGN: NEGATIVE
POC MOLECULAR INFLUENZA B AGN: NEGATIVE
POCT GLUCOSE: 106 MG/DL (ref 70–110)
POCT GLUCOSE: 106 MG/DL (ref 70–110)
POCT GLUCOSE: 115 MG/DL (ref 70–110)
POCT GLUCOSE: 118 MG/DL (ref 70–110)
POCT GLUCOSE: 121 MG/DL (ref 70–110)
POCT GLUCOSE: 125 MG/DL (ref 70–110)
POCT GLUCOSE: 130 MG/DL (ref 70–110)
POCT GLUCOSE: 131 MG/DL (ref 70–110)
POCT GLUCOSE: 135 MG/DL (ref 70–110)
POCT GLUCOSE: 137 MG/DL (ref 70–110)
POCT GLUCOSE: 139 MG/DL (ref 70–110)
POCT GLUCOSE: 142 MG/DL (ref 70–110)
POCT GLUCOSE: 145 MG/DL (ref 70–110)
POCT GLUCOSE: 146 MG/DL (ref 70–110)
POCT GLUCOSE: 147 MG/DL (ref 70–110)
POCT GLUCOSE: 154 MG/DL (ref 70–110)
POCT GLUCOSE: 154 MG/DL (ref 70–110)
POCT GLUCOSE: 155 MG/DL (ref 70–110)
POCT GLUCOSE: 158 MG/DL (ref 70–110)
POCT GLUCOSE: 159 MG/DL (ref 70–110)
POCT GLUCOSE: 161 MG/DL (ref 70–110)
POCT GLUCOSE: 168 MG/DL (ref 70–110)
POCT GLUCOSE: 171 MG/DL (ref 70–110)
POCT GLUCOSE: 171 MG/DL (ref 70–110)
POCT GLUCOSE: 172 MG/DL (ref 70–110)
POCT GLUCOSE: 173 MG/DL (ref 70–110)
POCT GLUCOSE: 173 MG/DL (ref 70–110)
POCT GLUCOSE: 175 MG/DL (ref 70–110)
POCT GLUCOSE: 179 MG/DL (ref 70–110)
POCT GLUCOSE: 179 MG/DL (ref 70–110)
POCT GLUCOSE: 181 MG/DL (ref 70–110)
POCT GLUCOSE: 184 MG/DL (ref 70–110)
POCT GLUCOSE: 184 MG/DL (ref 70–110)
POCT GLUCOSE: 188 MG/DL (ref 70–110)
POCT GLUCOSE: 192 MG/DL (ref 70–110)
POCT GLUCOSE: 192 MG/DL (ref 70–110)
POCT GLUCOSE: 194 MG/DL (ref 70–110)
POCT GLUCOSE: 199 MG/DL (ref 70–110)
POCT GLUCOSE: 199 MG/DL (ref 70–110)
POCT GLUCOSE: 200 MG/DL (ref 70–110)
POCT GLUCOSE: 201 MG/DL (ref 70–110)
POCT GLUCOSE: 208 MG/DL (ref 70–110)
POCT GLUCOSE: 208 MG/DL (ref 70–110)
POCT GLUCOSE: 214 MG/DL (ref 70–110)
POCT GLUCOSE: 228 MG/DL (ref 70–110)
POCT GLUCOSE: 233 MG/DL (ref 70–110)
POCT GLUCOSE: 69 MG/DL (ref 70–110)
POCT GLUCOSE: 85 MG/DL (ref 70–110)
POCT GLUCOSE: 86 MG/DL (ref 70–110)
POCT GLUCOSE: 93 MG/DL (ref 70–110)
POCT GLUCOSE: 96 MG/DL (ref 70–110)
POCT GLUCOSE: 97 MG/DL (ref 70–110)
POIKILOCYTOSIS BLD QL SMEAR: ABNORMAL
POIKILOCYTOSIS BLD QL SMEAR: SLIGHT
POIKILOCYTOSIS BLD QL SMEAR: SLIGHT
POLYCHROMASIA BLD QL SMEAR: ABNORMAL
POTASSIUM SERPL-SCNC: 3.9 MMOL/L (ref 3.5–5.1)
POTASSIUM SERPL-SCNC: 4.1 MMOL/L (ref 3.5–5.1)
POTASSIUM SERPL-SCNC: 4.2 MMOL/L (ref 3.5–5.1)
POTASSIUM SERPL-SCNC: 4.3 MMOL/L (ref 3.5–5.1)
POTASSIUM SERPL-SCNC: 4.4 MMOL/L (ref 3.5–5.1)
POTASSIUM SERPL-SCNC: 4.5 MMOL/L (ref 3.5–5.1)
POTASSIUM SERPL-SCNC: 4.6 MMOL/L (ref 3.5–5.1)
POTASSIUM SERPL-SCNC: 4.7 MMOL/L (ref 3.5–5.1)
POTASSIUM SERPL-SCNC: 4.7 MMOL/L (ref 3.5–5.1)
POTASSIUM SERPL-SCNC: 4.9 MMOL/L (ref 3.5–5.1)
POTASSIUM SERPL-SCNC: 5 MMOL/L (ref 3.5–5.1)
PROCALCITONIN SERPL IA-MCNC: 21.6 NG/ML
PROCALCITONIN SERPL IA-MCNC: 48.05 NG/ML
PROCALCITONIN SERPL IA-MCNC: 7.03 NG/ML
PROT SERPL-MCNC: 6.4 G/DL (ref 6–8.4)
PROT SERPL-MCNC: 7 G/DL (ref 6–8.4)
PROT SERPL-MCNC: 7.3 G/DL (ref 6–8.4)
PROT SERPL-MCNC: 7.5 G/DL (ref 6–8.4)
PROT UR QL STRIP: ABNORMAL
PROT UR-MCNC: 104 MG/DL (ref 0–15)
PROT/CREAT UR: 1.66 MG/G{CREAT} (ref 0–0.2)
PROTEUS SPECIES: DETECTED
PROTEUS SPECIES: NOT DETECTED
PSEUDOMONAS AERUGINOSA: NOT DETECTED
PSEUDOMONAS AERUGINOSA: NOT DETECTED
PTH-INTACT SERPL-MCNC: 102.1 PG/ML (ref 9–77)
RBC # BLD AUTO: 3.33 M/UL (ref 4.6–6.2)
RBC # BLD AUTO: 3.36 M/UL (ref 4.6–6.2)
RBC # BLD AUTO: 3.38 M/UL (ref 4.6–6.2)
RBC # BLD AUTO: 3.38 M/UL (ref 4.6–6.2)
RBC # BLD AUTO: 3.39 M/UL (ref 4.6–6.2)
RBC # BLD AUTO: 3.42 M/UL (ref 4.6–6.2)
RBC # BLD AUTO: 3.44 M/UL (ref 4.6–6.2)
RBC # BLD AUTO: 3.49 M/UL (ref 4.6–6.2)
RBC # BLD AUTO: 3.51 M/UL (ref 4.6–6.2)
RBC # BLD AUTO: 3.58 M/UL (ref 4.6–6.2)
RBC # BLD AUTO: 3.6 M/UL (ref 4.6–6.2)
RBC # BLD AUTO: 3.61 M/UL (ref 4.6–6.2)
RBC # BLD AUTO: 3.62 M/UL (ref 4.6–6.2)
RBC # BLD AUTO: 3.7 M/UL (ref 4.6–6.2)
RBC # BLD AUTO: 3.79 M/UL (ref 4.6–6.2)
RBC # BLD AUTO: 3.87 M/UL (ref 4.6–6.2)
RBC # BLD AUTO: 5.58 M/UL (ref 4.6–6.2)
RBC #/AREA URNS HPF: 11 /HPF (ref 0–4)
RBC #/AREA URNS HPF: 3 /HPF (ref 0–4)
RBC #/AREA URNS HPF: 8 /HPF (ref 0–4)
RBC #/AREA URNS HPF: >100 /HPF (ref 0–4)
SALMONELLA SP: NOT DETECTED
SALMONELLA SP: NOT DETECTED
SARS-COV-2 RDRP RESP QL NAA+PROBE: NEGATIVE
SARS-COV-2 RDRP RESP QL NAA+PROBE: NEGATIVE
SATURATED IRON: 4 % (ref 20–50)
SERRATIA MARCESCENS: NOT DETECTED
SERRATIA MARCESCENS: NOT DETECTED
SODIUM SERPL-SCNC: 129 MMOL/L (ref 136–145)
SODIUM SERPL-SCNC: 129 MMOL/L (ref 136–145)
SODIUM SERPL-SCNC: 130 MMOL/L (ref 136–145)
SODIUM SERPL-SCNC: 130 MMOL/L (ref 136–145)
SODIUM SERPL-SCNC: 131 MMOL/L (ref 136–145)
SODIUM SERPL-SCNC: 132 MMOL/L (ref 136–145)
SODIUM SERPL-SCNC: 133 MMOL/L (ref 136–145)
SODIUM SERPL-SCNC: 134 MMOL/L (ref 136–145)
SODIUM SERPL-SCNC: 135 MMOL/L (ref 136–145)
SODIUM SERPL-SCNC: 136 MMOL/L (ref 136–145)
SODIUM SERPL-SCNC: 136 MMOL/L (ref 136–145)
SODIUM UR-SCNC: 65 MMOL/L (ref 20–250)
SP GR UR STRIP: 1.01 (ref 1–1.03)
SQUAMOUS #/AREA URNS HPF: 1 /HPF
STAPHYLOCOCCUS AUREUS: NOT DETECTED
STAPHYLOCOCCUS AUREUS: NOT DETECTED
STAPHYLOCOCCUS EPIDERMIDIS: NOT DETECTED
STAPHYLOCOCCUS EPIDERMIDIS: NOT DETECTED
STAPHYLOCOCCUS LUGDUNESIS: NOT DETECTED
STAPHYLOCOCCUS LUGDUNESIS: NOT DETECTED
STAPHYLOCOCCUS SPECIES: NOT DETECTED
STAPHYLOCOCCUS SPECIES: NOT DETECTED
STENOTROPHOMONAS MALTOPHILIA: NOT DETECTED
STENOTROPHOMONAS MALTOPHILIA: NOT DETECTED
STREPTOCOCCUS AGALACTIAE: DETECTED
STREPTOCOCCUS AGALACTIAE: NOT DETECTED
STREPTOCOCCUS PNEUMONIAE: NOT DETECTED
STREPTOCOCCUS PNEUMONIAE: NOT DETECTED
STREPTOCOCCUS PYOGENES: NOT DETECTED
STREPTOCOCCUS PYOGENES: NOT DETECTED
STREPTOCOCCUS SPECIES: ABNORMAL
STREPTOCOCCUS SPECIES: NOT DETECTED
TACROLIMUS BLD-MCNC: 5.3 NG/ML (ref 5–15)
TACROLIMUS BLD-MCNC: 5.5 NG/ML (ref 5–15)
TACROLIMUS BLD-MCNC: 6.2 NG/ML (ref 5–15)
TACROLIMUS BLD-MCNC: 6.5 NG/ML (ref 5–15)
TACROLIMUS BLD-MCNC: 8.4 NG/ML (ref 5–15)
TACROLIMUS BLD-MCNC: <2 NG/ML (ref 5–15)
TARGETS BLD QL SMEAR: ABNORMAL
TOTAL IRON BINDING CAPACITY: 327 UG/DL (ref 250–450)
TRANSFERRIN SERPL-MCNC: 221 MG/DL (ref 200–375)
TROPONIN I SERPL DL<=0.01 NG/ML-MCNC: 0.02 NG/ML (ref 0–0.03)
TROPONIN I SERPL DL<=0.01 NG/ML-MCNC: <0.006 NG/ML (ref 0–0.03)
TROPONIN I SERPL DL<=0.01 NG/ML-MCNC: <0.006 NG/ML (ref 0–0.03)
TSH SERPL DL<=0.005 MIU/L-ACNC: 1.38 UIU/ML (ref 0.4–4)
URN SPEC COLLECT METH UR: ABNORMAL
UROBILINOGEN UR STRIP-ACNC: NEGATIVE EU/DL
VAN A/B: ABNORMAL
VAN A/B: NOT DETECTED
VANCOMYCIN SERPL-MCNC: 13 UG/ML
VANCOMYCIN SERPL-MCNC: 14.9 UG/ML
VANCOMYCIN SERPL-MCNC: 19.6 UG/ML
VANCOMYCIN SERPL-MCNC: 19.7 UG/ML
VANCOMYCIN SERPL-MCNC: 20 UG/ML
VANCOMYCIN SERPL-MCNC: 22.4 UG/ML
VANCOMYCIN SERPL-MCNC: 22.5 UG/ML
VANCOMYCIN SERPL-MCNC: 7 UG/ML
VIM GENE: ABNORMAL
VIM GENE: NOT DETECTED
VIT B12 SERPL-MCNC: 460 PG/ML (ref 210–950)
WBC # BLD AUTO: 10.23 K/UL (ref 3.9–12.7)
WBC # BLD AUTO: 10.57 K/UL (ref 3.9–12.7)
WBC # BLD AUTO: 11.26 K/UL (ref 3.9–12.7)
WBC # BLD AUTO: 13.83 K/UL (ref 3.9–12.7)
WBC # BLD AUTO: 18.84 K/UL (ref 3.9–12.7)
WBC # BLD AUTO: 2.12 K/UL (ref 3.9–12.7)
WBC # BLD AUTO: 21.55 K/UL (ref 3.9–12.7)
WBC # BLD AUTO: 4.7 K/UL (ref 3.9–12.7)
WBC # BLD AUTO: 5.02 K/UL (ref 3.9–12.7)
WBC # BLD AUTO: 5.82 K/UL (ref 3.9–12.7)
WBC # BLD AUTO: 5.96 K/UL (ref 3.9–12.7)
WBC # BLD AUTO: 6.05 K/UL (ref 3.9–12.7)
WBC # BLD AUTO: 6.35 K/UL (ref 3.9–12.7)
WBC # BLD AUTO: 6.83 K/UL (ref 3.9–12.7)
WBC # BLD AUTO: 7.15 K/UL (ref 3.9–12.7)
WBC # BLD AUTO: 7.43 K/UL (ref 3.9–12.7)
WBC # BLD AUTO: 9.12 K/UL (ref 3.9–12.7)
WBC #/AREA URNS HPF: >100 /HPF (ref 0–5)
WBC CLUMPS URNS QL MICRO: ABNORMAL

## 2023-01-01 PROCEDURE — 25000003 PHARM REV CODE 250: Mod: HCNC | Performed by: NURSE PRACTITIONER

## 2023-01-01 PROCEDURE — 25000003 PHARM REV CODE 250: Mod: HCNC | Performed by: STUDENT IN AN ORGANIZED HEALTH CARE EDUCATION/TRAINING PROGRAM

## 2023-01-01 PROCEDURE — 99233 PR SUBSEQUENT HOSPITAL CARE,LEVL III: ICD-10-PCS | Mod: HCNC,,, | Performed by: INTERNAL MEDICINE

## 2023-01-01 PROCEDURE — 63600175 PHARM REV CODE 636 W HCPCS: Mod: HCNC | Performed by: INTERNAL MEDICINE

## 2023-01-01 PROCEDURE — 63600175 PHARM REV CODE 636 W HCPCS: Mod: JZ,JG,HCNC | Performed by: INTERNAL MEDICINE

## 2023-01-01 PROCEDURE — 11000001 HC ACUTE MED/SURG PRIVATE ROOM: Mod: HCNC

## 2023-01-01 PROCEDURE — 85025 COMPLETE CBC W/AUTO DIFF WBC: CPT | Mod: HCNC | Performed by: NURSE PRACTITIONER

## 2023-01-01 PROCEDURE — 99223 1ST HOSP IP/OBS HIGH 75: CPT | Mod: NSCH,,, | Performed by: INTERNAL MEDICINE

## 2023-01-01 PROCEDURE — 25000003 PHARM REV CODE 250: Mod: HCNC | Performed by: INTERNAL MEDICINE

## 2023-01-01 PROCEDURE — 97110 THERAPEUTIC EXERCISES: CPT | Mod: HCNC

## 2023-01-01 PROCEDURE — 99238 PR HOSPITAL DISCHARGE DAY,<30 MIN: ICD-10-PCS | Mod: 25,HCNC,, | Performed by: INTERNAL MEDICINE

## 2023-01-01 PROCEDURE — G0180 MD CERTIFICATION HHA PATIENT: HCPCS | Mod: ,,, | Performed by: HOSPITALIST

## 2023-01-01 PROCEDURE — 93010 EKG 12-LEAD: ICD-10-PCS | Mod: HCNC,,, | Performed by: INTERNAL MEDICINE

## 2023-01-01 PROCEDURE — 99900035 HC TECH TIME PER 15 MIN (STAT): Mod: HCNC

## 2023-01-01 PROCEDURE — 63600175 PHARM REV CODE 636 W HCPCS: Mod: HCNC | Performed by: NURSE PRACTITIONER

## 2023-01-01 PROCEDURE — 36415 COLL VENOUS BLD VENIPUNCTURE: CPT | Mod: HCNC | Performed by: NURSE PRACTITIONER

## 2023-01-01 PROCEDURE — 99232 SBSQ HOSP IP/OBS MODERATE 35: CPT | Mod: HCNC,,, | Performed by: INTERNAL MEDICINE

## 2023-01-01 PROCEDURE — 83735 ASSAY OF MAGNESIUM: CPT | Mod: HCNC | Performed by: NURSE PRACTITIONER

## 2023-01-01 PROCEDURE — 80053 COMPREHEN METABOLIC PANEL: CPT | Mod: HCNC | Performed by: EMERGENCY MEDICINE

## 2023-01-01 PROCEDURE — 87077 CULTURE AEROBIC IDENTIFY: CPT | Mod: HCNC | Performed by: INTERNAL MEDICINE

## 2023-01-01 PROCEDURE — 96372 THER/PROPH/DIAG INJ SC/IM: CPT | Performed by: STUDENT IN AN ORGANIZED HEALTH CARE EDUCATION/TRAINING PROGRAM

## 2023-01-01 PROCEDURE — 80048 BASIC METABOLIC PNL TOTAL CA: CPT | Mod: HCNC | Performed by: NURSE PRACTITIONER

## 2023-01-01 PROCEDURE — 87077 CULTURE AEROBIC IDENTIFY: CPT | Mod: HCNC | Performed by: EMERGENCY MEDICINE

## 2023-01-01 PROCEDURE — 84484 ASSAY OF TROPONIN QUANT: CPT | Mod: HCNC | Performed by: EMERGENCY MEDICINE

## 2023-01-01 PROCEDURE — 27100108: Mod: HCNC

## 2023-01-01 PROCEDURE — G0378 HOSPITAL OBSERVATION PER HR: HCPCS | Mod: HCNC

## 2023-01-01 PROCEDURE — 80053 COMPREHEN METABOLIC PANEL: CPT | Mod: HCNC | Performed by: NURSE PRACTITIONER

## 2023-01-01 PROCEDURE — 99233 PR SUBSEQUENT HOSPITAL CARE,LEVL III: ICD-10-PCS | Mod: NSCH,95,, | Performed by: INTERNAL MEDICINE

## 2023-01-01 PROCEDURE — 80197 ASSAY OF TACROLIMUS: CPT | Mod: HCNC | Performed by: INTERNAL MEDICINE

## 2023-01-01 PROCEDURE — 20000000 HC ICU ROOM: Mod: HCNC

## 2023-01-01 PROCEDURE — 27000221 HC OXYGEN, UP TO 24 HOURS: Mod: HCNC

## 2023-01-01 PROCEDURE — 97162 PT EVAL MOD COMPLEX 30 MIN: CPT | Mod: HCNC

## 2023-01-01 PROCEDURE — 63600175 PHARM REV CODE 636 W HCPCS: Mod: HCNC | Performed by: STUDENT IN AN ORGANIZED HEALTH CARE EDUCATION/TRAINING PROGRAM

## 2023-01-01 PROCEDURE — 94761 N-INVAS EAR/PLS OXIMETRY MLT: CPT | Mod: HCNC

## 2023-01-01 PROCEDURE — 87040 BLOOD CULTURE FOR BACTERIA: CPT | Mod: 59,HCNC | Performed by: EMERGENCY MEDICINE

## 2023-01-01 PROCEDURE — 84145 PROCALCITONIN (PCT): CPT | Mod: HCNC | Performed by: EMERGENCY MEDICINE

## 2023-01-01 PROCEDURE — 85025 COMPLETE CBC W/AUTO DIFF WBC: CPT | Mod: HCNC | Performed by: EMERGENCY MEDICINE

## 2023-01-01 PROCEDURE — 96365 THER/PROPH/DIAG IV INF INIT: CPT | Mod: HCNC

## 2023-01-01 PROCEDURE — 87502 INFLUENZA DNA AMP PROBE: CPT | Mod: HCNC

## 2023-01-01 PROCEDURE — 99223 PR INITIAL HOSPITAL CARE,LEVL III: ICD-10-PCS | Mod: HCNC,,, | Performed by: INTERNAL MEDICINE

## 2023-01-01 PROCEDURE — 31500 INTUBATION: ICD-10-PCS | Mod: HCNC,,, | Performed by: INTERNAL MEDICINE

## 2023-01-01 PROCEDURE — 99233 SBSQ HOSP IP/OBS HIGH 50: CPT | Mod: HCNC,,, | Performed by: INTERNAL MEDICINE

## 2023-01-01 PROCEDURE — 27000207 HC ISOLATION: Mod: HCNC

## 2023-01-01 PROCEDURE — 87154 CUL TYP ID BLD PTHGN 6+ TRGT: CPT | Mod: HCNC | Performed by: EMERGENCY MEDICINE

## 2023-01-01 PROCEDURE — G0180 PR HOME HEALTH MD CERTIFICATION: ICD-10-PCS | Mod: ,,, | Performed by: HOSPITALIST

## 2023-01-01 PROCEDURE — 97530 THERAPEUTIC ACTIVITIES: CPT | Mod: HCNC

## 2023-01-01 PROCEDURE — 82962 GLUCOSE BLOOD TEST: CPT | Mod: HCNC

## 2023-01-01 PROCEDURE — 82746 ASSAY OF FOLIC ACID SERUM: CPT | Mod: HCNC | Performed by: STUDENT IN AN ORGANIZED HEALTH CARE EDUCATION/TRAINING PROGRAM

## 2023-01-01 PROCEDURE — 80202 ASSAY OF VANCOMYCIN: CPT | Mod: HCNC | Performed by: STUDENT IN AN ORGANIZED HEALTH CARE EDUCATION/TRAINING PROGRAM

## 2023-01-01 PROCEDURE — 27200966 HC CLOSED SUCTION SYSTEM: Mod: HCNC

## 2023-01-01 PROCEDURE — 85025 COMPLETE CBC W/AUTO DIFF WBC: CPT | Mod: HCNC | Performed by: HOSPITALIST

## 2023-01-01 PROCEDURE — 83880 ASSAY OF NATRIURETIC PEPTIDE: CPT | Mod: HCNC | Performed by: EMERGENCY MEDICINE

## 2023-01-01 PROCEDURE — 81000 URINALYSIS NONAUTO W/SCOPE: CPT | Mod: HCNC | Performed by: EMERGENCY MEDICINE

## 2023-01-01 PROCEDURE — 36573 INSJ PICC RS&I 5 YR+: CPT | Mod: HCNC

## 2023-01-01 PROCEDURE — 87070 CULTURE OTHR SPECIMN AEROBIC: CPT | Mod: HCNC | Performed by: INTERNAL MEDICINE

## 2023-01-01 PROCEDURE — 97530 THERAPEUTIC ACTIVITIES: CPT | Mod: HCNC,CQ

## 2023-01-01 PROCEDURE — 25000003 PHARM REV CODE 250: Mod: HCNC | Performed by: EMERGENCY MEDICINE

## 2023-01-01 PROCEDURE — 93005 ELECTROCARDIOGRAM TRACING: CPT | Mod: HCNC

## 2023-01-01 PROCEDURE — 84443 ASSAY THYROID STIM HORMONE: CPT | Mod: HCNC | Performed by: STUDENT IN AN ORGANIZED HEALTH CARE EDUCATION/TRAINING PROGRAM

## 2023-01-01 PROCEDURE — 87076 CULTURE ANAEROBE IDENT EACH: CPT | Mod: HCNC | Performed by: NURSE PRACTITIONER

## 2023-01-01 PROCEDURE — 96361 HYDRATE IV INFUSION ADD-ON: CPT | Mod: HCNC

## 2023-01-01 PROCEDURE — 83605 ASSAY OF LACTIC ACID: CPT | Mod: HCNC | Performed by: EMERGENCY MEDICINE

## 2023-01-01 PROCEDURE — 87186 SC STD MICRODIL/AGAR DIL: CPT | Mod: 59,HCNC | Performed by: INTERNAL MEDICINE

## 2023-01-01 PROCEDURE — 99291 CRITICAL CARE FIRST HOUR: CPT | Mod: HCNC

## 2023-01-01 PROCEDURE — 99900026 HC AIRWAY MAINTENANCE (STAT): Mod: HCNC

## 2023-01-01 PROCEDURE — A9585 GADOBUTROL INJECTION: HCPCS | Mod: HCNC | Performed by: STUDENT IN AN ORGANIZED HEALTH CARE EDUCATION/TRAINING PROGRAM

## 2023-01-01 PROCEDURE — 85007 BL SMEAR W/DIFF WBC COUNT: CPT | Mod: HCNC | Performed by: NURSE PRACTITIONER

## 2023-01-01 PROCEDURE — 93010 ELECTROCARDIOGRAM REPORT: CPT | Mod: HCNC,,, | Performed by: INTERNAL MEDICINE

## 2023-01-01 PROCEDURE — 87086 URINE CULTURE/COLONY COUNT: CPT | Mod: HCNC | Performed by: EMERGENCY MEDICINE

## 2023-01-01 PROCEDURE — 83036 HEMOGLOBIN GLYCOSYLATED A1C: CPT | Mod: HCNC | Performed by: NURSE PRACTITIONER

## 2023-01-01 PROCEDURE — 80048 BASIC METABOLIC PNL TOTAL CA: CPT | Mod: HCNC | Performed by: STUDENT IN AN ORGANIZED HEALTH CARE EDUCATION/TRAINING PROGRAM

## 2023-01-01 PROCEDURE — 99233 SBSQ HOSP IP/OBS HIGH 50: CPT | Mod: ,,, | Performed by: NURSE PRACTITIONER

## 2023-01-01 PROCEDURE — 85025 COMPLETE CBC W/AUTO DIFF WBC: CPT | Mod: HCNC | Performed by: STUDENT IN AN ORGANIZED HEALTH CARE EDUCATION/TRAINING PROGRAM

## 2023-01-01 PROCEDURE — 99232 PR SUBSEQUENT HOSPITAL CARE,LEVL II: ICD-10-PCS | Mod: HCNC,,, | Performed by: INTERNAL MEDICINE

## 2023-01-01 PROCEDURE — 80202 ASSAY OF VANCOMYCIN: CPT | Mod: HCNC | Performed by: INTERNAL MEDICINE

## 2023-01-01 PROCEDURE — 94002 VENT MGMT INPAT INIT DAY: CPT | Mod: HCNC

## 2023-01-01 PROCEDURE — 99233 PR SUBSEQUENT HOSPITAL CARE,LEVL III: ICD-10-PCS | Mod: ,,, | Performed by: NURSE PRACTITIONER

## 2023-01-01 PROCEDURE — 80202 ASSAY OF VANCOMYCIN: CPT | Mod: HCNC | Performed by: EMERGENCY MEDICINE

## 2023-01-01 PROCEDURE — 84145 PROCALCITONIN (PCT): CPT | Mod: HCNC | Performed by: NURSE PRACTITIONER

## 2023-01-01 PROCEDURE — 80180 DRUG SCRN QUAN MYCOPHENOLATE: CPT | Mod: HCNC | Performed by: NURSE PRACTITIONER

## 2023-01-01 PROCEDURE — 87075 CULTR BACTERIA EXCEPT BLOOD: CPT | Mod: HCNC | Performed by: INTERNAL MEDICINE

## 2023-01-01 PROCEDURE — 97166 OT EVAL MOD COMPLEX 45 MIN: CPT | Mod: HCNC

## 2023-01-01 PROCEDURE — 80069 RENAL FUNCTION PANEL: CPT | Mod: HCNC | Performed by: INTERNAL MEDICINE

## 2023-01-01 PROCEDURE — 76937 US GUIDE VASCULAR ACCESS: CPT | Mod: HCNC

## 2023-01-01 PROCEDURE — 87493 C DIFF AMPLIFIED PROBE: CPT | Mod: HCNC | Performed by: NURSE PRACTITIONER

## 2023-01-01 PROCEDURE — 82570 ASSAY OF URINE CREATININE: CPT | Mod: HCNC | Performed by: EMERGENCY MEDICINE

## 2023-01-01 PROCEDURE — 87116 MYCOBACTERIA CULTURE: CPT | Mod: HCNC | Performed by: INTERNAL MEDICINE

## 2023-01-01 PROCEDURE — 84484 ASSAY OF TROPONIN QUANT: CPT | Mod: HCNC | Performed by: NURSE PRACTITIONER

## 2023-01-01 PROCEDURE — 99223 PR INITIAL HOSPITAL CARE,LEVL III: ICD-10-PCS | Mod: NSCH,,, | Performed by: INTERNAL MEDICINE

## 2023-01-01 PROCEDURE — 81000 URINALYSIS NONAUTO W/SCOPE: CPT | Mod: 91,HCNC | Performed by: INTERNAL MEDICINE

## 2023-01-01 PROCEDURE — 87186 SC STD MICRODIL/AGAR DIL: CPT | Mod: 59,HCNC | Performed by: EMERGENCY MEDICINE

## 2023-01-01 PROCEDURE — 25500020 PHARM REV CODE 255: Mod: HCNC | Performed by: STUDENT IN AN ORGANIZED HEALTH CARE EDUCATION/TRAINING PROGRAM

## 2023-01-01 PROCEDURE — 82040 ASSAY OF SERUM ALBUMIN: CPT | Mod: HCNC | Performed by: INTERNAL MEDICINE

## 2023-01-01 PROCEDURE — 36556 INSERT NON-TUNNEL CV CATH: CPT | Mod: 59,HCNC,, | Performed by: INTERNAL MEDICINE

## 2023-01-01 PROCEDURE — 82306 VITAMIN D 25 HYDROXY: CPT | Mod: HCNC | Performed by: INTERNAL MEDICINE

## 2023-01-01 PROCEDURE — 99233 SBSQ HOSP IP/OBS HIGH 50: CPT | Mod: NSCH,95,, | Performed by: INTERNAL MEDICINE

## 2023-01-01 PROCEDURE — 80048 BASIC METABOLIC PNL TOTAL CA: CPT | Mod: HCNC,XB | Performed by: NURSE PRACTITIONER

## 2023-01-01 PROCEDURE — 82607 VITAMIN B-12: CPT | Mod: HCNC | Performed by: STUDENT IN AN ORGANIZED HEALTH CARE EDUCATION/TRAINING PROGRAM

## 2023-01-01 PROCEDURE — 99223 1ST HOSP IP/OBS HIGH 75: CPT | Mod: HCNC,,, | Performed by: INTERNAL MEDICINE

## 2023-01-01 PROCEDURE — 84100 ASSAY OF PHOSPHORUS: CPT | Mod: 91,HCNC | Performed by: INTERNAL MEDICINE

## 2023-01-01 PROCEDURE — 83605 ASSAY OF LACTIC ACID: CPT | Mod: HCNC | Performed by: NURSE PRACTITIONER

## 2023-01-01 PROCEDURE — 36556 PR INSERT NON-TUNNEL CV CATH 5+ YRS OLD: ICD-10-PCS | Mod: 59,HCNC,, | Performed by: INTERNAL MEDICINE

## 2023-01-01 PROCEDURE — 80197 ASSAY OF TACROLIMUS: CPT | Mod: HCNC | Performed by: STUDENT IN AN ORGANIZED HEALTH CARE EDUCATION/TRAINING PROGRAM

## 2023-01-01 PROCEDURE — 36415 COLL VENOUS BLD VENIPUNCTURE: CPT | Mod: HCNC | Performed by: STUDENT IN AN ORGANIZED HEALTH CARE EDUCATION/TRAINING PROGRAM

## 2023-01-01 PROCEDURE — 99291 CRITICAL CARE FIRST HOUR: CPT | Mod: HCNC,,, | Performed by: NURSE PRACTITIONER

## 2023-01-01 PROCEDURE — 83935 ASSAY OF URINE OSMOLALITY: CPT | Mod: HCNC | Performed by: EMERGENCY MEDICINE

## 2023-01-01 PROCEDURE — 99238 HOSP IP/OBS DSCHRG MGMT 30/<: CPT | Mod: 25,HCNC,, | Performed by: INTERNAL MEDICINE

## 2023-01-01 PROCEDURE — 63600175 PHARM REV CODE 636 W HCPCS: Mod: HCNC | Performed by: EMERGENCY MEDICINE

## 2023-01-01 PROCEDURE — 51701 INSERT BLADDER CATHETER: CPT | Mod: HCNC

## 2023-01-01 PROCEDURE — 83605 ASSAY OF LACTIC ACID: CPT | Mod: 91,HCNC | Performed by: STUDENT IN AN ORGANIZED HEALTH CARE EDUCATION/TRAINING PROGRAM

## 2023-01-01 PROCEDURE — 36556 INSERT NON-TUNNEL CV CATH: CPT | Mod: HCNC

## 2023-01-01 PROCEDURE — 83930 ASSAY OF BLOOD OSMOLALITY: CPT | Mod: HCNC | Performed by: STUDENT IN AN ORGANIZED HEALTH CARE EDUCATION/TRAINING PROGRAM

## 2023-01-01 PROCEDURE — 87075 CULTR BACTERIA EXCEPT BLOOD: CPT | Mod: HCNC | Performed by: NURSE PRACTITIONER

## 2023-01-01 PROCEDURE — 87206 SMEAR FLUORESCENT/ACID STAI: CPT | Mod: HCNC | Performed by: INTERNAL MEDICINE

## 2023-01-01 PROCEDURE — 87086 URINE CULTURE/COLONY COUNT: CPT | Mod: HCNC | Performed by: INTERNAL MEDICINE

## 2023-01-01 PROCEDURE — 84300 ASSAY OF URINE SODIUM: CPT | Mod: HCNC | Performed by: EMERGENCY MEDICINE

## 2023-01-01 PROCEDURE — 87040 BLOOD CULTURE FOR BACTERIA: CPT | Mod: HCNC | Performed by: EMERGENCY MEDICINE

## 2023-01-01 PROCEDURE — 87086 URINE CULTURE/COLONY COUNT: CPT | Mod: 59,HCNC | Performed by: INTERNAL MEDICINE

## 2023-01-01 PROCEDURE — 31500 INSERT EMERGENCY AIRWAY: CPT | Mod: HCNC,,, | Performed by: INTERNAL MEDICINE

## 2023-01-01 PROCEDURE — 36415 COLL VENOUS BLD VENIPUNCTURE: CPT | Mod: HCNC | Performed by: INTERNAL MEDICINE

## 2023-01-01 PROCEDURE — 85027 COMPLETE CBC AUTOMATED: CPT | Mod: HCNC | Performed by: NURSE PRACTITIONER

## 2023-01-01 PROCEDURE — 84466 ASSAY OF TRANSFERRIN: CPT | Mod: HCNC | Performed by: STUDENT IN AN ORGANIZED HEALTH CARE EDUCATION/TRAINING PROGRAM

## 2023-01-01 PROCEDURE — 99291 CRITICAL CARE FIRST HOUR: CPT | Mod: 25,HCNC,, | Performed by: INTERNAL MEDICINE

## 2023-01-01 PROCEDURE — 82728 ASSAY OF FERRITIN: CPT | Mod: HCNC | Performed by: STUDENT IN AN ORGANIZED HEALTH CARE EDUCATION/TRAINING PROGRAM

## 2023-01-01 PROCEDURE — C1751 CATH, INF, PER/CENT/MIDLINE: HCPCS | Mod: HCNC

## 2023-01-01 PROCEDURE — 83970 ASSAY OF PARATHORMONE: CPT | Mod: HCNC | Performed by: INTERNAL MEDICINE

## 2023-01-01 PROCEDURE — 80197 ASSAY OF TACROLIMUS: CPT | Mod: HCNC | Performed by: NURSE PRACTITIONER

## 2023-01-01 PROCEDURE — 87106 FUNGI IDENTIFICATION YEAST: CPT | Mod: HCNC | Performed by: EMERGENCY MEDICINE

## 2023-01-01 PROCEDURE — 87040 BLOOD CULTURE FOR BACTERIA: CPT | Mod: 59,HCNC | Performed by: NURSE PRACTITIONER

## 2023-01-01 PROCEDURE — 31500 INSERT EMERGENCY AIRWAY: CPT | Mod: HCNC

## 2023-01-01 PROCEDURE — 81000 URINALYSIS NONAUTO W/SCOPE: CPT | Mod: HCNC | Performed by: INTERNAL MEDICINE

## 2023-01-01 PROCEDURE — 99285 EMERGENCY DEPT VISIT HI MDM: CPT | Mod: 25,HCNC

## 2023-01-01 PROCEDURE — 84100 ASSAY OF PHOSPHORUS: CPT | Mod: HCNC | Performed by: NURSE PRACTITIONER

## 2023-01-01 PROCEDURE — 84100 ASSAY OF PHOSPHORUS: CPT | Mod: HCNC | Performed by: INTERNAL MEDICINE

## 2023-01-01 PROCEDURE — U0002 COVID-19 LAB TEST NON-CDC: HCPCS | Mod: HCNC | Performed by: STUDENT IN AN ORGANIZED HEALTH CARE EDUCATION/TRAINING PROGRAM

## 2023-01-01 PROCEDURE — 87449 NOS EACH ORGANISM AG IA: CPT | Mod: HCNC | Performed by: NURSE PRACTITIONER

## 2023-01-01 PROCEDURE — 99291 PR CRITICAL CARE, E/M 30-74 MINUTES: ICD-10-PCS | Mod: 25,HCNC,, | Performed by: INTERNAL MEDICINE

## 2023-01-01 PROCEDURE — 83880 ASSAY OF NATRIURETIC PEPTIDE: CPT | Mod: HCNC | Performed by: STUDENT IN AN ORGANIZED HEALTH CARE EDUCATION/TRAINING PROGRAM

## 2023-01-01 PROCEDURE — 84484 ASSAY OF TROPONIN QUANT: CPT | Mod: HCNC | Performed by: STUDENT IN AN ORGANIZED HEALTH CARE EDUCATION/TRAINING PROGRAM

## 2023-01-01 PROCEDURE — 99291 PR CRITICAL CARE, E/M 30-74 MINUTES: ICD-10-PCS | Mod: HCNC,,, | Performed by: NURSE PRACTITIONER

## 2023-01-01 PROCEDURE — 36415 COLL VENOUS BLD VENIPUNCTURE: CPT | Mod: HCNC | Performed by: HOSPITALIST

## 2023-01-01 RX ORDER — SODIUM BICARBONATE 650 MG/1
650 TABLET ORAL 3 TIMES DAILY
Status: DISCONTINUED | OUTPATIENT
Start: 2023-01-01 | End: 2023-01-01

## 2023-01-01 RX ORDER — MORPHINE SULFATE 4 MG/ML
4 INJECTION, SOLUTION INTRAMUSCULAR; INTRAVENOUS EVERY 6 HOURS PRN
Status: DISCONTINUED | OUTPATIENT
Start: 2023-01-01 | End: 2023-01-01

## 2023-01-01 RX ORDER — ACETAMINOPHEN 500 MG
500 TABLET ORAL EVERY 6 HOURS PRN
Status: DISCONTINUED | OUTPATIENT
Start: 2023-01-01 | End: 2023-01-01 | Stop reason: HOSPADM

## 2023-01-01 RX ORDER — ACETAMINOPHEN 500 MG
5000 TABLET ORAL
COMMUNITY

## 2023-01-01 RX ORDER — ACETAMINOPHEN 325 MG/1
650 TABLET ORAL
Status: COMPLETED | OUTPATIENT
Start: 2023-01-01 | End: 2023-01-01

## 2023-01-01 RX ORDER — ONDANSETRON 4 MG/1
4 TABLET, ORALLY DISINTEGRATING ORAL
Status: COMPLETED | OUTPATIENT
Start: 2023-01-01 | End: 2023-01-01

## 2023-01-01 RX ORDER — SODIUM CHLORIDE 9 MG/ML
INJECTION, SOLUTION INTRAVENOUS
Status: DISCONTINUED | OUTPATIENT
Start: 2023-01-01 | End: 2023-01-01 | Stop reason: HOSPADM

## 2023-01-01 RX ORDER — VANCOMYCIN HYDROCHLORIDE 25 MG/ML
125 KIT ORAL EVERY 6 HOURS
Status: DISCONTINUED | OUTPATIENT
Start: 2023-01-01 | End: 2023-01-01 | Stop reason: HOSPADM

## 2023-01-01 RX ORDER — MELOXICAM 7.5 MG/1
7.5 TABLET ORAL DAILY
Status: ON HOLD | COMMUNITY
Start: 2023-01-01 | End: 2023-01-01

## 2023-01-01 RX ORDER — CARVEDILOL 3.12 MG/1
3.12 TABLET ORAL 2 TIMES DAILY
COMMUNITY
Start: 2022-01-01

## 2023-01-01 RX ORDER — TRAMADOL HYDROCHLORIDE 50 MG/1
50 TABLET ORAL EVERY 6 HOURS PRN
Status: DISCONTINUED | OUTPATIENT
Start: 2023-01-01 | End: 2023-01-01

## 2023-01-01 RX ORDER — HYDROCODONE BITARTRATE AND ACETAMINOPHEN 10; 325 MG/1; MG/1
1 TABLET ORAL EVERY 4 HOURS PRN
Status: DISCONTINUED | OUTPATIENT
Start: 2023-01-01 | End: 2023-01-01 | Stop reason: HOSPADM

## 2023-01-01 RX ORDER — EPINEPHRINE 0.1 MG/ML
INJECTION INTRAVENOUS CODE/TRAUMA/SEDATION MEDICATION
Status: DISCONTINUED | OUTPATIENT
Start: 2023-01-01 | End: 2023-01-01 | Stop reason: HOSPADM

## 2023-01-01 RX ORDER — MORPHINE SULFATE 2 MG/ML
2 INJECTION, SOLUTION INTRAMUSCULAR; INTRAVENOUS ONCE
Status: COMPLETED | OUTPATIENT
Start: 2023-01-01 | End: 2023-01-01

## 2023-01-01 RX ORDER — CEFEPIME HYDROCHLORIDE 1 G/50ML
2 INJECTION, SOLUTION INTRAVENOUS
Status: DISCONTINUED | OUTPATIENT
Start: 2023-01-01 | End: 2023-01-01

## 2023-01-01 RX ORDER — ASCORBIC ACID 500 MG
1 TABLET ORAL EVERY MORNING
COMMUNITY

## 2023-01-01 RX ORDER — HYDROCODONE BITARTRATE AND ACETAMINOPHEN 5; 325 MG/1; MG/1
1 TABLET ORAL EVERY 4 HOURS PRN
Status: DISCONTINUED | OUTPATIENT
Start: 2023-01-01 | End: 2023-01-01

## 2023-01-01 RX ORDER — METRONIDAZOLE 500 MG/1
500 TABLET ORAL EVERY 8 HOURS
Status: DISCONTINUED | OUTPATIENT
Start: 2023-01-01 | End: 2023-01-01

## 2023-01-01 RX ORDER — MORPHINE SULFATE 4 MG/ML
4 INJECTION, SOLUTION INTRAMUSCULAR; INTRAVENOUS ONCE
Status: COMPLETED | OUTPATIENT
Start: 2023-01-01 | End: 2023-01-01

## 2023-01-01 RX ORDER — MECLIZINE HYDROCHLORIDE 25 MG/1
25 TABLET ORAL 3 TIMES DAILY PRN
Status: DISCONTINUED | OUTPATIENT
Start: 2023-01-01 | End: 2023-01-01 | Stop reason: HOSPADM

## 2023-01-01 RX ORDER — INSULIN ASPART 100 [IU]/ML
1-10 INJECTION, SOLUTION INTRAVENOUS; SUBCUTANEOUS EVERY 6 HOURS PRN
Status: DISCONTINUED | OUTPATIENT
Start: 2023-01-01 | End: 2023-01-01

## 2023-01-01 RX ORDER — MORPHINE SULFATE 2 MG/ML
2 INJECTION, SOLUTION INTRAMUSCULAR; INTRAVENOUS
Status: DISCONTINUED | OUTPATIENT
Start: 2023-01-01 | End: 2023-01-01 | Stop reason: HOSPADM

## 2023-01-01 RX ORDER — TACROLIMUS 0.5 MG/1
0.5 CAPSULE ORAL 2 TIMES DAILY
Status: DISCONTINUED | OUTPATIENT
Start: 2023-01-01 | End: 2023-01-01

## 2023-01-01 RX ORDER — MIDODRINE HYDROCHLORIDE 2.5 MG/1
2.5 TABLET ORAL EVERY 8 HOURS
Status: DISCONTINUED | OUTPATIENT
Start: 2023-01-01 | End: 2023-01-01

## 2023-01-01 RX ORDER — GLUCAGON 1 MG
1 KIT INJECTION
Status: DISCONTINUED | OUTPATIENT
Start: 2023-01-01 | End: 2023-01-01 | Stop reason: HOSPADM

## 2023-01-01 RX ORDER — TACROLIMUS 1 MG/1
1 CAPSULE ORAL 2 TIMES DAILY
Status: DISCONTINUED | OUTPATIENT
Start: 2023-01-01 | End: 2023-01-01

## 2023-01-01 RX ORDER — TRAMADOL HYDROCHLORIDE 50 MG/1
50 TABLET ORAL EVERY 8 HOURS PRN
Qty: 15 TABLET | Refills: 0 | Status: SHIPPED | OUTPATIENT
Start: 2023-01-01

## 2023-01-01 RX ORDER — CEFEPIME HYDROCHLORIDE 1 G/50ML
1 INJECTION, SOLUTION INTRAVENOUS
Status: COMPLETED | OUTPATIENT
Start: 2023-01-01 | End: 2023-01-01

## 2023-01-01 RX ORDER — SODIUM BICARBONATE 1 MEQ/ML
SYRINGE (ML) INTRAVENOUS CODE/TRAUMA/SEDATION MEDICATION
Status: DISCONTINUED | OUTPATIENT
Start: 2023-01-01 | End: 2023-01-01 | Stop reason: HOSPADM

## 2023-01-01 RX ORDER — MICONAZOLE NITRATE 2 %
POWDER (GRAM) TOPICAL 2 TIMES DAILY
Status: DISCONTINUED | OUTPATIENT
Start: 2023-01-01 | End: 2023-01-01

## 2023-01-01 RX ORDER — HYDROCODONE BITARTRATE AND ACETAMINOPHEN 10; 325 MG/1; MG/1
1 TABLET ORAL EVERY 8 HOURS PRN
Status: DISCONTINUED | OUTPATIENT
Start: 2023-01-01 | End: 2023-01-01 | Stop reason: HOSPADM

## 2023-01-01 RX ORDER — GADOBUTROL 604.72 MG/ML
10 INJECTION INTRAVENOUS
Status: COMPLETED | OUTPATIENT
Start: 2023-01-01 | End: 2023-01-01

## 2023-01-01 RX ORDER — IPRATROPIUM BROMIDE AND ALBUTEROL SULFATE 2.5; .5 MG/3ML; MG/3ML
3 SOLUTION RESPIRATORY (INHALATION) EVERY 6 HOURS PRN
Status: DISCONTINUED | OUTPATIENT
Start: 2023-01-01 | End: 2023-01-01 | Stop reason: HOSPADM

## 2023-01-01 RX ORDER — MAGNESIUM SULFATE HEPTAHYDRATE 40 MG/ML
4 INJECTION, SOLUTION INTRAVENOUS ONCE
Status: COMPLETED | OUTPATIENT
Start: 2023-01-01 | End: 2023-01-01

## 2023-01-01 RX ORDER — VANCOMYCIN HYDROCHLORIDE 125 MG/1
125 CAPSULE ORAL EVERY 6 HOURS
Qty: 16 CAPSULE | Refills: 0 | Status: SHIPPED | OUTPATIENT
Start: 2023-01-01 | End: 2023-01-01

## 2023-01-01 RX ORDER — SODIUM CHLORIDE 0.9 % (FLUSH) 0.9 %
10 SYRINGE (ML) INJECTION EVERY 12 HOURS PRN
Status: DISCONTINUED | OUTPATIENT
Start: 2023-01-01 | End: 2023-01-01 | Stop reason: HOSPADM

## 2023-01-01 RX ORDER — HEPARIN SODIUM 5000 [USP'U]/ML
5000 INJECTION, SOLUTION INTRAVENOUS; SUBCUTANEOUS EVERY 8 HOURS
Status: DISCONTINUED | OUTPATIENT
Start: 2023-01-01 | End: 2023-01-01

## 2023-01-01 RX ORDER — HYDROCODONE BITARTRATE AND ACETAMINOPHEN 10; 325 MG/1; MG/1
1 TABLET ORAL EVERY 6 HOURS PRN
Status: DISCONTINUED | OUTPATIENT
Start: 2023-01-01 | End: 2023-01-01

## 2023-01-01 RX ORDER — FENTANYL CITRATE-0.9 % NACL/PF 10 MCG/ML
0-200 PLASTIC BAG, INJECTION (ML) INTRAVENOUS CONTINUOUS
Status: DISCONTINUED | OUTPATIENT
Start: 2023-01-01 | End: 2023-01-01

## 2023-01-01 RX ORDER — LORAZEPAM 2 MG/ML
4 INJECTION INTRAMUSCULAR ONCE
Status: COMPLETED | OUTPATIENT
Start: 2023-01-01 | End: 2023-01-01

## 2023-01-01 RX ORDER — ONDANSETRON 2 MG/ML
4 INJECTION INTRAMUSCULAR; INTRAVENOUS EVERY 6 HOURS PRN
Status: DISCONTINUED | OUTPATIENT
Start: 2023-01-01 | End: 2023-01-01 | Stop reason: HOSPADM

## 2023-01-01 RX ORDER — METRONIDAZOLE 500 MG/1
500 TABLET ORAL EVERY 8 HOURS
Status: DISCONTINUED | OUTPATIENT
Start: 2023-01-01 | End: 2023-01-01 | Stop reason: HOSPADM

## 2023-01-01 RX ORDER — LEVOTHYROXINE SODIUM 125 UG/1
125 TABLET ORAL DAILY
COMMUNITY
Start: 2023-01-01

## 2023-01-01 RX ORDER — MUPIROCIN 20 MG/G
OINTMENT TOPICAL 2 TIMES DAILY
Status: COMPLETED | OUTPATIENT
Start: 2023-01-01 | End: 2023-01-01

## 2023-01-01 RX ORDER — CHLORHEXIDINE GLUCONATE ORAL RINSE 1.2 MG/ML
15 SOLUTION DENTAL 2 TIMES DAILY
Status: DISCONTINUED | OUTPATIENT
Start: 2023-01-01 | End: 2023-01-01

## 2023-01-01 RX ORDER — FAMOTIDINE 10 MG/ML
20 INJECTION INTRAVENOUS DAILY
Status: DISCONTINUED | OUTPATIENT
Start: 2023-01-01 | End: 2023-01-01

## 2023-01-01 RX ORDER — MUPIROCIN 20 MG/G
OINTMENT TOPICAL 2 TIMES DAILY
Status: DISCONTINUED | OUTPATIENT
Start: 2023-01-01 | End: 2023-01-01

## 2023-01-01 RX ORDER — ACETAMINOPHEN 500 MG
500 TABLET ORAL EVERY 8 HOURS PRN
Status: DISCONTINUED | OUTPATIENT
Start: 2023-01-01 | End: 2023-01-01 | Stop reason: HOSPADM

## 2023-01-01 RX ORDER — TRAMADOL HYDROCHLORIDE 50 MG/1
50 TABLET ORAL EVERY 6 HOURS PRN
Status: ON HOLD | COMMUNITY
Start: 2023-01-01 | End: 2023-01-01 | Stop reason: SDUPTHER

## 2023-01-01 RX ORDER — GABAPENTIN 300 MG/1
300 CAPSULE ORAL 2 TIMES DAILY
Status: DISCONTINUED | OUTPATIENT
Start: 2023-01-01 | End: 2023-01-01

## 2023-01-01 RX ORDER — LORAZEPAM 2 MG/ML
4 INJECTION INTRAMUSCULAR EVERY 4 HOURS PRN
Status: DISCONTINUED | OUTPATIENT
Start: 2023-01-01 | End: 2023-01-01

## 2023-01-01 RX ORDER — FAMOTIDINE 20 MG/1
20 TABLET, FILM COATED ORAL DAILY
Status: DISCONTINUED | OUTPATIENT
Start: 2023-01-01 | End: 2023-01-01 | Stop reason: HOSPADM

## 2023-01-01 RX ORDER — HYDROCODONE BITARTRATE AND ACETAMINOPHEN 5; 325 MG/1; MG/1
1 TABLET ORAL EVERY 6 HOURS PRN
Status: DISCONTINUED | OUTPATIENT
Start: 2023-01-01 | End: 2023-01-01

## 2023-01-01 RX ORDER — MEROPENEM AND SODIUM CHLORIDE 1 G/50ML
1 INJECTION, SOLUTION INTRAVENOUS
Status: DISCONTINUED | OUTPATIENT
Start: 2023-01-01 | End: 2023-01-01 | Stop reason: DRUGHIGH

## 2023-01-01 RX ORDER — TACROLIMUS 1 MG/1
1 CAPSULE ORAL EVERY MORNING
Status: DISCONTINUED | OUTPATIENT
Start: 2023-01-01 | End: 2023-01-01 | Stop reason: HOSPADM

## 2023-01-01 RX ORDER — GLUCAGON 1 MG
1 KIT INJECTION
Status: DISCONTINUED | OUTPATIENT
Start: 2023-01-01 | End: 2023-01-01

## 2023-01-01 RX ORDER — HEPARIN SODIUM 5000 [USP'U]/ML
5000 INJECTION, SOLUTION INTRAVENOUS; SUBCUTANEOUS EVERY 8 HOURS
Status: DISCONTINUED | OUTPATIENT
Start: 2023-01-01 | End: 2023-01-01 | Stop reason: HOSPADM

## 2023-01-01 RX ORDER — MEROPENEM AND SODIUM CHLORIDE 500 MG/50ML
500 INJECTION, SOLUTION INTRAVENOUS
Status: DISCONTINUED | OUTPATIENT
Start: 2023-01-01 | End: 2023-01-01

## 2023-01-01 RX ORDER — TALC
6 POWDER (GRAM) TOPICAL NIGHTLY PRN
Status: DISCONTINUED | OUTPATIENT
Start: 2023-01-01 | End: 2023-01-01 | Stop reason: HOSPADM

## 2023-01-01 RX ORDER — ONDANSETRON 4 MG/1
4 TABLET, ORALLY DISINTEGRATING ORAL EVERY 8 HOURS PRN
Status: DISCONTINUED | OUTPATIENT
Start: 2023-01-01 | End: 2023-01-01 | Stop reason: HOSPADM

## 2023-01-01 RX ORDER — GABAPENTIN 300 MG/1
300 CAPSULE ORAL 3 TIMES DAILY
Status: DISCONTINUED | OUTPATIENT
Start: 2023-01-01 | End: 2023-01-01

## 2023-01-01 RX ORDER — MORPHINE SULFATE 4 MG/ML
4 INJECTION, SOLUTION INTRAMUSCULAR; INTRAVENOUS
Status: DISCONTINUED | OUTPATIENT
Start: 2023-01-01 | End: 2023-01-01

## 2023-01-01 RX ORDER — METRONIDAZOLE 500 MG/1
500 TABLET ORAL EVERY 8 HOURS
Qty: 90 TABLET | Refills: 1 | Status: SHIPPED | OUTPATIENT
Start: 2023-01-01 | End: 2023-05-21

## 2023-01-01 RX ORDER — NOREPINEPHRINE BITARTRATE/D5W 4MG/250ML
0-.5 PLASTIC BAG, INJECTION (ML) INTRAVENOUS CONTINUOUS
Status: DISCONTINUED | OUTPATIENT
Start: 2023-01-01 | End: 2023-01-01

## 2023-01-01 RX ORDER — LANOLIN ALCOHOL/MO/W.PET/CERES
1 CREAM (GRAM) TOPICAL 2 TIMES DAILY
Status: DISCONTINUED | OUTPATIENT
Start: 2023-01-01 | End: 2023-01-01

## 2023-01-01 RX ORDER — MORPHINE SULFATE 2 MG/ML
2 INJECTION, SOLUTION INTRAMUSCULAR; INTRAVENOUS EVERY 6 HOURS PRN
Status: DISCONTINUED | OUTPATIENT
Start: 2023-01-01 | End: 2023-01-01 | Stop reason: HOSPADM

## 2023-01-01 RX ORDER — INSULIN HUMAN 100 [IU]/ML
INJECTION, SOLUTION PARENTERAL
COMMUNITY
Start: 2023-01-01

## 2023-01-01 RX ORDER — INSULIN ASPART 100 [IU]/ML
0-5 INJECTION, SOLUTION INTRAVENOUS; SUBCUTANEOUS
Status: DISCONTINUED | OUTPATIENT
Start: 2023-01-01 | End: 2023-01-01 | Stop reason: HOSPADM

## 2023-01-01 RX ORDER — TACROLIMUS 0.5 MG/1
0.5 CAPSULE ORAL EVERY EVENING
Status: DISCONTINUED | OUTPATIENT
Start: 2023-01-01 | End: 2023-01-01 | Stop reason: HOSPADM

## 2023-01-01 RX ORDER — SODIUM BICARBONATE 650 MG/1
1300 TABLET ORAL 2 TIMES DAILY
Status: DISCONTINUED | OUTPATIENT
Start: 2023-01-01 | End: 2023-01-01 | Stop reason: HOSPADM

## 2023-01-01 RX ORDER — TACROLIMUS 1 MG/1
1 CAPSULE ORAL EVERY MORNING
Status: DISCONTINUED | OUTPATIENT
Start: 2023-01-01 | End: 2023-01-01

## 2023-01-01 RX ORDER — INSULIN ASPART 100 [IU]/ML
1-10 INJECTION, SOLUTION INTRAVENOUS; SUBCUTANEOUS
Status: DISCONTINUED | OUTPATIENT
Start: 2023-01-01 | End: 2023-01-01 | Stop reason: HOSPADM

## 2023-01-01 RX ORDER — ACETAMINOPHEN 325 MG/1
650 TABLET ORAL EVERY 6 HOURS PRN
Status: DISCONTINUED | OUTPATIENT
Start: 2023-01-01 | End: 2023-01-01 | Stop reason: HOSPADM

## 2023-01-01 RX ORDER — MORPHINE SULFATE 4 MG/ML
2 INJECTION, SOLUTION INTRAMUSCULAR; INTRAVENOUS EVERY 6 HOURS PRN
Status: DISCONTINUED | OUTPATIENT
Start: 2023-01-01 | End: 2023-01-01

## 2023-01-01 RX ORDER — NOREPINEPHRINE BITARTRATE/D5W 4MG/250ML
0-3 PLASTIC BAG, INJECTION (ML) INTRAVENOUS CONTINUOUS
Status: DISCONTINUED | OUTPATIENT
Start: 2023-01-01 | End: 2023-01-01 | Stop reason: HOSPADM

## 2023-01-01 RX ORDER — DIPHENHYDRAMINE HCL 25 MG
25 CAPSULE ORAL EVERY 6 HOURS PRN
Status: DISCONTINUED | OUTPATIENT
Start: 2023-01-01 | End: 2023-01-01 | Stop reason: HOSPADM

## 2023-01-01 RX ORDER — NOREPINEPHRINE BITARTRATE/D5W 4MG/250ML
0-3 PLASTIC BAG, INJECTION (ML) INTRAVENOUS CONTINUOUS
Status: DISCONTINUED | OUTPATIENT
Start: 2023-01-01 | End: 2023-01-01

## 2023-01-01 RX ORDER — NALOXONE HYDROCHLORIDE 1 MG/ML
0.02 INJECTION INTRAMUSCULAR; INTRAVENOUS; SUBCUTANEOUS
Status: DISCONTINUED | OUTPATIENT
Start: 2023-01-01 | End: 2023-01-01 | Stop reason: HOSPADM

## 2023-01-01 RX ORDER — ATORVASTATIN CALCIUM 40 MG/1
80 TABLET, FILM COATED ORAL DAILY
Status: DISCONTINUED | OUTPATIENT
Start: 2023-01-01 | End: 2023-01-01

## 2023-01-01 RX ORDER — TACROLIMUS 0.5 MG/1
0.5 CAPSULE ORAL EVERY EVENING
Status: DISCONTINUED | OUTPATIENT
Start: 2023-01-01 | End: 2023-01-01

## 2023-01-01 RX ORDER — DEXTROSE 40 %
30 GEL (GRAM) ORAL
Status: DISCONTINUED | OUTPATIENT
Start: 2023-01-01 | End: 2023-01-01 | Stop reason: HOSPADM

## 2023-01-01 RX ORDER — DEXTROSE 40 %
15 GEL (GRAM) ORAL
Status: DISCONTINUED | OUTPATIENT
Start: 2023-01-01 | End: 2023-01-01 | Stop reason: HOSPADM

## 2023-01-01 RX ORDER — ACETAMINOPHEN 500 MG
5000 TABLET ORAL
Status: DISCONTINUED | OUTPATIENT
Start: 2023-01-01 | End: 2023-01-01

## 2023-01-01 RX ORDER — CALCIUM GLUCONATE 98 MG/ML
1 INJECTION, SOLUTION INTRAVENOUS ONCE
Status: COMPLETED | OUTPATIENT
Start: 2023-01-01 | End: 2023-01-01

## 2023-01-01 RX ORDER — LORAZEPAM 2 MG/ML
2 INJECTION INTRAMUSCULAR
Status: DISCONTINUED | OUTPATIENT
Start: 2023-01-01 | End: 2023-01-01 | Stop reason: HOSPADM

## 2023-01-01 RX ORDER — GABAPENTIN 300 MG/1
300 CAPSULE ORAL 3 TIMES DAILY
Status: DISCONTINUED | OUTPATIENT
Start: 2023-01-01 | End: 2023-01-01 | Stop reason: HOSPADM

## 2023-01-01 RX ORDER — SODIUM CHLORIDE 9 MG/ML
INJECTION, SOLUTION INTRAVENOUS CONTINUOUS
Status: ACTIVE | OUTPATIENT
Start: 2023-01-01 | End: 2023-01-01

## 2023-01-01 RX ADMIN — HYDROCODONE BITARTRATE AND ACETAMINOPHEN 1 TABLET: 5; 325 TABLET ORAL at 03:04

## 2023-01-01 RX ADMIN — SODIUM BICARBONATE 650 MG TABLET 1300 MG: at 10:04

## 2023-01-01 RX ADMIN — HYDROCODONE BITARTRATE AND ACETAMINOPHEN 1 TABLET: 10; 325 TABLET ORAL at 12:04

## 2023-01-01 RX ADMIN — GABAPENTIN 300 MG: 300 CAPSULE ORAL at 09:04

## 2023-01-01 RX ADMIN — INSULIN ASPART 1 UNITS: 100 INJECTION, SOLUTION INTRAVENOUS; SUBCUTANEOUS at 09:04

## 2023-01-01 RX ADMIN — METRONIDAZOLE 500 MG: 500 TABLET ORAL at 02:04

## 2023-01-01 RX ADMIN — SODIUM BICARBONATE 650 MG TABLET 1300 MG: at 08:04

## 2023-01-01 RX ADMIN — HYDROCODONE BITARTRATE AND ACETAMINOPHEN 1 TABLET: 10; 325 TABLET ORAL at 07:04

## 2023-01-01 RX ADMIN — FAMOTIDINE 20 MG: 20 TABLET, FILM COATED ORAL at 08:04

## 2023-01-01 RX ADMIN — ACETAMINOPHEN 650 MG: 325 TABLET ORAL at 06:04

## 2023-01-01 RX ADMIN — MEROPENEM AND SODIUM CHLORIDE 500 MG: 500 INJECTION, SOLUTION INTRAVENOUS at 03:04

## 2023-01-01 RX ADMIN — LEVOTHYROXINE SODIUM 125 MCG: 125 TABLET ORAL at 08:04

## 2023-01-01 RX ADMIN — SODIUM BICARBONATE 650 MG TABLET 650 MG: at 02:04

## 2023-01-01 RX ADMIN — SODIUM BICARBONATE 650 MG TABLET 650 MG: at 03:04

## 2023-01-01 RX ADMIN — VANCOMYCIN HYDROCHLORIDE 125 MG: KIT at 11:04

## 2023-01-01 RX ADMIN — MORPHINE SULFATE 4 MG: 4 INJECTION INTRAVENOUS at 08:04

## 2023-01-01 RX ADMIN — VANCOMYCIN HYDROCHLORIDE 1250 MG: 1.25 INJECTION, POWDER, LYOPHILIZED, FOR SOLUTION INTRAVENOUS at 01:04

## 2023-01-01 RX ADMIN — METRONIDAZOLE 500 MG: 500 TABLET ORAL at 03:04

## 2023-01-01 RX ADMIN — CHOLECALCIFEROL TAB 125 MCG (5000 UNIT) 5000 UNITS: 125 TAB at 04:04

## 2023-01-01 RX ADMIN — VANCOMYCIN HYDROCHLORIDE 1750 MG: 10 INJECTION, POWDER, LYOPHILIZED, FOR SOLUTION INTRAVENOUS at 07:04

## 2023-01-01 RX ADMIN — CEFEPIME 2 G: 2 INJECTION, POWDER, FOR SOLUTION INTRAVENOUS at 09:04

## 2023-01-01 RX ADMIN — INSULIN DETEMIR 12 UNITS: 100 INJECTION, SOLUTION SUBCUTANEOUS at 09:04

## 2023-01-01 RX ADMIN — MORPHINE SULFATE 4 MG: 4 INJECTION INTRAVENOUS at 02:04

## 2023-01-01 RX ADMIN — FAMOTIDINE 20 MG: 20 TABLET, FILM COATED ORAL at 09:04

## 2023-01-01 RX ADMIN — VANCOMYCIN HYDROCHLORIDE 125 MG: KIT at 06:04

## 2023-01-01 RX ADMIN — HYDROCODONE BITARTRATE AND ACETAMINOPHEN 1 TABLET: 10; 325 TABLET ORAL at 03:04

## 2023-01-01 RX ADMIN — METRONIDAZOLE 500 MG: 500 TABLET ORAL at 09:04

## 2023-01-01 RX ADMIN — HYDROCODONE BITARTRATE AND ACETAMINOPHEN 1 TABLET: 5; 325 TABLET ORAL at 08:04

## 2023-01-01 RX ADMIN — HYDROCODONE BITARTRATE AND ACETAMINOPHEN 1 TABLET: 5; 325 TABLET ORAL at 11:04

## 2023-01-01 RX ADMIN — TACROLIMUS 0.5 MG: 0.5 CAPSULE ORAL at 06:04

## 2023-01-01 RX ADMIN — TACROLIMUS 0.5 MG: 0.5 CAPSULE ORAL at 07:04

## 2023-01-01 RX ADMIN — CEFTRIAXONE 2 G: 2 INJECTION, POWDER, FOR SOLUTION INTRAMUSCULAR; INTRAVENOUS at 08:04

## 2023-01-01 RX ADMIN — SODIUM CHLORIDE 1000 ML: 9 INJECTION, SOLUTION INTRAVENOUS at 02:04

## 2023-01-01 RX ADMIN — ATORVASTATIN CALCIUM 80 MG: 40 TABLET, FILM COATED ORAL at 09:04

## 2023-01-01 RX ADMIN — GABAPENTIN 300 MG: 300 CAPSULE ORAL at 08:04

## 2023-01-01 RX ADMIN — MORPHINE SULFATE 4 MG: 4 INJECTION INTRAVENOUS at 10:04

## 2023-01-01 RX ADMIN — INSULIN ASPART 4 UNITS: 100 INJECTION, SOLUTION INTRAVENOUS; SUBCUTANEOUS at 05:04

## 2023-01-01 RX ADMIN — MUPIROCIN: 20 OINTMENT TOPICAL at 09:04

## 2023-01-01 RX ADMIN — MORPHINE SULFATE 4 MG: 4 INJECTION INTRAVENOUS at 03:04

## 2023-01-01 RX ADMIN — SODIUM CHLORIDE 1000 ML: 0.9 INJECTION, SOLUTION INTRAVENOUS at 01:04

## 2023-01-01 RX ADMIN — METRONIDAZOLE 500 MG: 500 TABLET ORAL at 10:04

## 2023-01-01 RX ADMIN — MORPHINE SULFATE 4 MG: 4 INJECTION INTRAVENOUS at 09:04

## 2023-01-01 RX ADMIN — HEPARIN SODIUM 5000 UNITS: 5000 INJECTION INTRAVENOUS; SUBCUTANEOUS at 05:04

## 2023-01-01 RX ADMIN — MEROPENEM AND SODIUM CHLORIDE 500 MG: 500 INJECTION, SOLUTION INTRAVENOUS at 08:04

## 2023-01-01 RX ADMIN — HYDROCODONE BITARTRATE AND ACETAMINOPHEN 1 TABLET: 5; 325 TABLET ORAL at 07:04

## 2023-01-01 RX ADMIN — METRONIDAZOLE 500 MG: 500 TABLET ORAL at 05:04

## 2023-01-01 RX ADMIN — GABAPENTIN 300 MG: 300 CAPSULE ORAL at 11:04

## 2023-01-01 RX ADMIN — DIPHENHYDRAMINE HYDROCHLORIDE 25 MG: 25 CAPSULE ORAL at 01:04

## 2023-01-01 RX ADMIN — SODIUM CHLORIDE: 9 INJECTION, SOLUTION INTRAVENOUS at 06:04

## 2023-01-01 RX ADMIN — GABAPENTIN 300 MG: 300 CAPSULE ORAL at 02:04

## 2023-01-01 RX ADMIN — INSULIN ASPART 2 UNITS: 100 INJECTION, SOLUTION INTRAVENOUS; SUBCUTANEOUS at 05:04

## 2023-01-01 RX ADMIN — TACROLIMUS 1 MG: 1 CAPSULE ORAL at 08:04

## 2023-01-01 RX ADMIN — TACROLIMUS 1 MG: 1 CAPSULE ORAL at 11:04

## 2023-01-01 RX ADMIN — HYDROCODONE BITARTRATE AND ACETAMINOPHEN 1 TABLET: 10; 325 TABLET ORAL at 06:04

## 2023-01-01 RX ADMIN — CEFEPIME 2 G: 2 INJECTION, POWDER, FOR SOLUTION INTRAVENOUS at 06:04

## 2023-01-01 RX ADMIN — HYDROCODONE BITARTRATE AND ACETAMINOPHEN 1 TABLET: 10; 325 TABLET ORAL at 01:04

## 2023-01-01 RX ADMIN — MICONAZOLE NITRATE: 20 POWDER TOPICAL at 10:04

## 2023-01-01 RX ADMIN — METRONIDAZOLE 500 MG: 500 TABLET ORAL at 06:04

## 2023-01-01 RX ADMIN — VANCOMYCIN HYDROCHLORIDE 125 MG: KIT at 12:04

## 2023-01-01 RX ADMIN — EPINEPHRINE 1 MG: 0.1 INJECTION INTRACARDIAC; INTRAVENOUS at 01:04

## 2023-01-01 RX ADMIN — SODIUM BICARBONATE 650 MG TABLET 1300 MG: at 09:04

## 2023-01-01 RX ADMIN — FERROUS SULFATE TAB 325 MG (65 MG ELEMENTAL FE) 1 EACH: 325 (65 FE) TAB at 10:04

## 2023-01-01 RX ADMIN — Medication 6 MG: at 12:04

## 2023-01-01 RX ADMIN — SODIUM CHLORIDE: 9 INJECTION, SOLUTION INTRAVENOUS at 11:04

## 2023-01-01 RX ADMIN — VANCOMYCIN HYDROCHLORIDE 125 MG: KIT at 05:04

## 2023-01-01 RX ADMIN — MIDODRINE HYDROCHLORIDE 2.5 MG: 2.5 TABLET ORAL at 10:04

## 2023-01-01 RX ADMIN — MUPIROCIN: 20 OINTMENT TOPICAL at 08:04

## 2023-01-01 RX ADMIN — HEPARIN SODIUM 5000 UNITS: 5000 INJECTION INTRAVENOUS; SUBCUTANEOUS at 09:04

## 2023-01-01 RX ADMIN — NOREPINEPHRINE BITARTRATE 0.04 MCG/KG/MIN: 4 INJECTION, SOLUTION INTRAVENOUS at 11:04

## 2023-01-01 RX ADMIN — MICONAZOLE NITRATE: 20 POWDER TOPICAL at 08:04

## 2023-01-01 RX ADMIN — MICONAZOLE NITRATE: 20 POWDER TOPICAL at 09:04

## 2023-01-01 RX ADMIN — HYDROCODONE BITARTRATE AND ACETAMINOPHEN 1 TABLET: 10; 325 TABLET ORAL at 02:04

## 2023-01-01 RX ADMIN — HYDROCODONE BITARTRATE AND ACETAMINOPHEN 1 TABLET: 5; 325 TABLET ORAL at 02:04

## 2023-01-01 RX ADMIN — FERROUS SULFATE TAB 325 MG (65 MG ELEMENTAL FE) 1 EACH: 325 (65 FE) TAB at 09:04

## 2023-01-01 RX ADMIN — DIPHENHYDRAMINE HYDROCHLORIDE 25 MG: 25 CAPSULE ORAL at 07:04

## 2023-01-01 RX ADMIN — HEPARIN SODIUM 5000 UNITS: 5000 INJECTION INTRAVENOUS; SUBCUTANEOUS at 06:04

## 2023-01-01 RX ADMIN — HYDROCODONE BITARTRATE AND ACETAMINOPHEN 1 TABLET: 10; 325 TABLET ORAL at 10:04

## 2023-01-01 RX ADMIN — NOREPINEPHRINE BITARTRATE 0.02 MCG/KG/MIN: 4 INJECTION, SOLUTION INTRAVENOUS at 11:04

## 2023-01-01 RX ADMIN — Medication 6 MG: at 08:04

## 2023-01-01 RX ADMIN — HYDROCODONE BITARTRATE AND ACETAMINOPHEN 1 TABLET: 5; 325 TABLET ORAL at 06:04

## 2023-01-01 RX ADMIN — FERROUS SULFATE TAB 325 MG (65 MG ELEMENTAL FE) 1 EACH: 325 (65 FE) TAB at 08:04

## 2023-01-01 RX ADMIN — SODIUM CHLORIDE: 9 INJECTION, SOLUTION INTRAVENOUS at 03:04

## 2023-01-01 RX ADMIN — TRAMADOL HYDROCHLORIDE 50 MG: 50 TABLET, COATED ORAL at 07:04

## 2023-01-01 RX ADMIN — DIPHENHYDRAMINE HYDROCHLORIDE 25 MG: 25 CAPSULE ORAL at 02:04

## 2023-01-01 RX ADMIN — HYDROCODONE BITARTRATE AND ACETAMINOPHEN 1 TABLET: 10; 325 TABLET ORAL at 11:04

## 2023-01-01 RX ADMIN — Medication 25 MCG/HR: at 04:04

## 2023-01-01 RX ADMIN — GABAPENTIN 300 MG: 300 CAPSULE ORAL at 03:04

## 2023-01-01 RX ADMIN — DIPHENHYDRAMINE HYDROCHLORIDE 25 MG: 25 CAPSULE ORAL at 03:04

## 2023-01-01 RX ADMIN — TRAMADOL HYDROCHLORIDE 50 MG: 50 TABLET, COATED ORAL at 11:04

## 2023-01-01 RX ADMIN — HYDROCODONE BITARTRATE AND ACETAMINOPHEN 1 TABLET: 5; 325 TABLET ORAL at 12:04

## 2023-01-01 RX ADMIN — MIDODRINE HYDROCHLORIDE 2.5 MG: 2.5 TABLET ORAL at 05:04

## 2023-01-01 RX ADMIN — THERA TABS 1 TABLET: TAB at 09:04

## 2023-01-01 RX ADMIN — SODIUM BICARBONATE 650 MG TABLET 650 MG: at 09:04

## 2023-01-01 RX ADMIN — HEPARIN SODIUM 5000 UNITS: 5000 INJECTION INTRAVENOUS; SUBCUTANEOUS at 10:04

## 2023-01-01 RX ADMIN — SODIUM BICARBONATE 650 MG TABLET 650 MG: at 10:04

## 2023-01-01 RX ADMIN — MICAFUNGIN SODIUM 100 MG: 100 INJECTION, POWDER, LYOPHILIZED, FOR SOLUTION INTRAVENOUS at 11:04

## 2023-01-01 RX ADMIN — HYDROCODONE BITARTRATE AND ACETAMINOPHEN 1 TABLET: 5; 325 TABLET ORAL at 10:04

## 2023-01-01 RX ADMIN — ATORVASTATIN CALCIUM 80 MG: 40 TABLET, FILM COATED ORAL at 08:04

## 2023-01-01 RX ADMIN — ATORVASTATIN CALCIUM 80 MG: 40 TABLET, FILM COATED ORAL at 10:04

## 2023-01-01 RX ADMIN — HYDROCODONE BITARTRATE AND ACETAMINOPHEN 1 TABLET: 10; 325 TABLET ORAL at 05:04

## 2023-01-01 RX ADMIN — SODIUM BICARBONATE 650 MG TABLET 650 MG: at 08:04

## 2023-01-01 RX ADMIN — LEVOTHYROXINE SODIUM 125 MCG: 125 TABLET ORAL at 10:04

## 2023-01-01 RX ADMIN — GABAPENTIN 300 MG: 300 CAPSULE ORAL at 10:04

## 2023-01-01 RX ADMIN — LEVOTHYROXINE SODIUM 125 MCG: 125 TABLET ORAL at 09:04

## 2023-01-01 RX ADMIN — CALCIUM GLUCONATE 1 G: 98 INJECTION, SOLUTION INTRAVENOUS at 10:04

## 2023-01-01 RX ADMIN — METRONIDAZOLE 500 MG: 500 TABLET ORAL at 07:04

## 2023-01-01 RX ADMIN — VANCOMYCIN HYDROCHLORIDE 1250 MG: 1.25 INJECTION, POWDER, LYOPHILIZED, FOR SOLUTION INTRAVENOUS at 02:04

## 2023-01-01 RX ADMIN — MORPHINE SULFATE 2 MG: 4 INJECTION INTRAVENOUS at 06:04

## 2023-01-01 RX ADMIN — INSULIN ASPART 4 UNITS: 100 INJECTION, SOLUTION INTRAVENOUS; SUBCUTANEOUS at 04:04

## 2023-01-01 RX ADMIN — TACROLIMUS 1 MG: 1 CAPSULE ORAL at 09:04

## 2023-01-01 RX ADMIN — ACETAMINOPHEN 500 MG: 500 TABLET ORAL at 05:04

## 2023-01-01 RX ADMIN — LORAZEPAM 2 MG: 2 INJECTION INTRAMUSCULAR; INTRAVENOUS at 05:04

## 2023-01-01 RX ADMIN — CEFEPIME 2 G: 2 INJECTION, POWDER, FOR SOLUTION INTRAVENOUS at 08:04

## 2023-01-01 RX ADMIN — HEPARIN SODIUM 5000 UNITS: 5000 INJECTION INTRAVENOUS; SUBCUTANEOUS at 02:04

## 2023-01-01 RX ADMIN — FERROUS SULFATE TAB 325 MG (65 MG ELEMENTAL FE) 1 EACH: 325 (65 FE) TAB at 11:04

## 2023-01-01 RX ADMIN — HYDROCODONE BITARTRATE AND ACETAMINOPHEN 1 TABLET: 10; 325 TABLET ORAL at 08:04

## 2023-01-01 RX ADMIN — THERA TABS 1 TABLET: TAB at 08:04

## 2023-01-01 RX ADMIN — FAMOTIDINE 20 MG: 10 INJECTION INTRAVENOUS at 04:04

## 2023-01-01 RX ADMIN — VANCOMYCIN HYDROCHLORIDE 1500 MG: 1.5 INJECTION, POWDER, LYOPHILIZED, FOR SOLUTION INTRAVENOUS at 06:04

## 2023-01-01 RX ADMIN — VANCOMYCIN HYDROCHLORIDE 1250 MG: 1.25 INJECTION, POWDER, LYOPHILIZED, FOR SOLUTION INTRAVENOUS at 04:04

## 2023-01-01 RX ADMIN — MEROPENEM AND SODIUM CHLORIDE 500 MG: 500 INJECTION, SOLUTION INTRAVENOUS at 04:04

## 2023-01-01 RX ADMIN — DIPHENHYDRAMINE HYDROCHLORIDE 25 MG: 25 CAPSULE ORAL at 09:04

## 2023-01-01 RX ADMIN — VANCOMYCIN HYDROCHLORIDE 1250 MG: 1.25 INJECTION, POWDER, LYOPHILIZED, FOR SOLUTION INTRAVENOUS at 03:04

## 2023-01-01 RX ADMIN — INSULIN ASPART 1 UNITS: 100 INJECTION, SOLUTION INTRAVENOUS; SUBCUTANEOUS at 10:04

## 2023-01-01 RX ADMIN — ATORVASTATIN CALCIUM 80 MG: 40 TABLET, FILM COATED ORAL at 06:04

## 2023-01-01 RX ADMIN — INSULIN ASPART 2 UNITS: 100 INJECTION, SOLUTION INTRAVENOUS; SUBCUTANEOUS at 11:04

## 2023-01-01 RX ADMIN — SODIUM CHLORIDE: 9 INJECTION, SOLUTION INTRAVENOUS at 08:04

## 2023-01-01 RX ADMIN — MORPHINE SULFATE 4 MG: 4 INJECTION INTRAVENOUS at 11:04

## 2023-01-01 RX ADMIN — HYDROCODONE BITARTRATE AND ACETAMINOPHEN 1 TABLET: 10; 325 TABLET ORAL at 09:04

## 2023-01-01 RX ADMIN — MUPIROCIN: 20 OINTMENT TOPICAL at 10:04

## 2023-01-01 RX ADMIN — SODIUM CHLORIDE 1000 ML: 9 INJECTION, SOLUTION INTRAVENOUS at 03:04

## 2023-01-01 RX ADMIN — SODIUM BICARBONATE 50 MEQ: 84 INJECTION, SOLUTION INTRAVENOUS at 01:04

## 2023-01-01 RX ADMIN — TACROLIMUS 1 MG: 1 CAPSULE ORAL at 10:04

## 2023-01-01 RX ADMIN — CEFTRIAXONE 2 G: 2 INJECTION, POWDER, FOR SOLUTION INTRAMUSCULAR; INTRAVENOUS at 10:04

## 2023-01-01 RX ADMIN — DIPHENHYDRAMINE HYDROCHLORIDE 25 MG: 25 CAPSULE ORAL at 08:04

## 2023-01-01 RX ADMIN — MORPHINE SULFATE 4 MG: 4 INJECTION INTRAVENOUS at 04:04

## 2023-01-01 RX ADMIN — HYDROCODONE BITARTRATE AND ACETAMINOPHEN 1 TABLET: 5; 325 TABLET ORAL at 09:04

## 2023-01-01 RX ADMIN — CEFEPIME HYDROCHLORIDE 1 G: 1 INJECTION, SOLUTION INTRAVENOUS at 03:04

## 2023-01-01 RX ADMIN — CEFEPIME 2 G: 2 INJECTION, POWDER, FOR SOLUTION INTRAVENOUS at 05:04

## 2023-01-01 RX ADMIN — GADOBUTROL 8 ML: 604.72 INJECTION INTRAVENOUS at 02:04

## 2023-01-01 RX ADMIN — SODIUM BICARBONATE 650 MG TABLET 1300 MG: at 11:04

## 2023-01-01 RX ADMIN — Medication 25 MCG/HR: at 02:04

## 2023-01-01 RX ADMIN — HEPARIN SODIUM 5000 UNITS: 5000 INJECTION INTRAVENOUS; SUBCUTANEOUS at 04:04

## 2023-01-01 RX ADMIN — HYDROCODONE BITARTRATE AND ACETAMINOPHEN 1 TABLET: 10; 325 TABLET ORAL at 04:04

## 2023-01-01 RX ADMIN — MORPHINE SULFATE 2 MG: 2 INJECTION, SOLUTION INTRAMUSCULAR; INTRAVENOUS at 12:04

## 2023-01-01 RX ADMIN — MAGNESIUM SULFATE HEPTAHYDRATE 4 G: 40 INJECTION, SOLUTION INTRAVENOUS at 05:04

## 2023-01-01 RX ADMIN — ONDANSETRON 4 MG: 2 INJECTION INTRAMUSCULAR; INTRAVENOUS at 01:04

## 2023-01-01 RX ADMIN — SODIUM CHLORIDE, POTASSIUM CHLORIDE, SODIUM LACTATE AND CALCIUM CHLORIDE 1000 ML: 600; 310; 30; 20 INJECTION, SOLUTION INTRAVENOUS at 02:04

## 2023-01-01 RX ADMIN — TRAMADOL HYDROCHLORIDE 50 MG: 50 TABLET, COATED ORAL at 08:04

## 2023-01-01 RX ADMIN — TACROLIMUS 0.5 MG: 0.5 CAPSULE ORAL at 05:04

## 2023-01-01 RX ADMIN — TACROLIMUS 1 MG: 1 CAPSULE ORAL at 07:04

## 2023-01-01 RX ADMIN — SODIUM CHLORIDE, POTASSIUM CHLORIDE, SODIUM LACTATE AND CALCIUM CHLORIDE 1000 ML: 600; 310; 30; 20 INJECTION, SOLUTION INTRAVENOUS at 06:04

## 2023-01-01 RX ADMIN — Medication 6 MG: at 09:04

## 2023-01-01 RX ADMIN — TACROLIMUS 0.5 MG: 0.5 CAPSULE ORAL at 09:04

## 2023-01-01 RX ADMIN — THERA TABS 1 TABLET: TAB at 10:04

## 2023-01-01 RX ADMIN — MIDODRINE HYDROCHLORIDE 2.5 MG: 2.5 TABLET ORAL at 04:04

## 2023-01-01 RX ADMIN — SODIUM CHLORIDE, POTASSIUM CHLORIDE, SODIUM LACTATE AND CALCIUM CHLORIDE 1500 ML: 600; 310; 30; 20 INJECTION, SOLUTION INTRAVENOUS at 07:04

## 2023-01-01 RX ADMIN — MIDODRINE HYDROCHLORIDE 2.5 MG: 2.5 TABLET ORAL at 06:04

## 2023-01-01 RX ADMIN — MICAFUNGIN SODIUM 100 MG: 100 INJECTION, POWDER, LYOPHILIZED, FOR SOLUTION INTRAVENOUS at 12:04

## 2023-01-01 RX ADMIN — INSULIN DETEMIR 12 UNITS: 100 INJECTION, SOLUTION SUBCUTANEOUS at 10:04

## 2023-01-01 RX ADMIN — CHOLECALCIFEROL TAB 125 MCG (5000 UNIT) 5000 UNITS: 125 TAB at 06:04

## 2023-01-01 RX ADMIN — MECLIZINE HYDROCHLORIDE 25 MG: 25 TABLET ORAL at 02:04

## 2023-01-01 RX ADMIN — EPOETIN ALFA-EPBX 10000 UNITS: 10000 INJECTION, SOLUTION INTRAVENOUS; SUBCUTANEOUS at 11:04

## 2023-01-01 RX ADMIN — ACETAMINOPHEN 650 MG: 325 TABLET ORAL at 01:04

## 2023-01-01 RX ADMIN — HYDROCODONE BITARTRATE AND ACETAMINOPHEN 1 TABLET: 5; 325 TABLET ORAL at 05:04

## 2023-01-01 RX ADMIN — MEROPENEM AND SODIUM CHLORIDE 500 MG: 500 INJECTION, SOLUTION INTRAVENOUS at 11:04

## 2023-01-01 RX ADMIN — INSULIN ASPART 2 UNITS: 100 INJECTION, SOLUTION INTRAVENOUS; SUBCUTANEOUS at 06:04

## 2023-01-01 RX ADMIN — INSULIN ASPART 4 UNITS: 100 INJECTION, SOLUTION INTRAVENOUS; SUBCUTANEOUS at 11:04

## 2023-01-01 RX ADMIN — MUPIROCIN: 20 OINTMENT TOPICAL at 11:04

## 2023-01-01 RX ADMIN — SODIUM CHLORIDE, POTASSIUM CHLORIDE, SODIUM LACTATE AND CALCIUM CHLORIDE 1000 ML: 600; 310; 30; 20 INJECTION, SOLUTION INTRAVENOUS at 10:04

## 2023-01-01 RX ADMIN — MORPHINE SULFATE 2 MG: 2 INJECTION, SOLUTION INTRAMUSCULAR; INTRAVENOUS at 05:04

## 2023-01-01 RX ADMIN — TACROLIMUS 1 MG: 1 CAPSULE ORAL at 05:04

## 2023-01-01 RX ADMIN — HEPARIN SODIUM 5000 UNITS: 5000 INJECTION INTRAVENOUS; SUBCUTANEOUS at 11:04

## 2023-01-01 RX ADMIN — INSULIN DETEMIR 12 UNITS: 100 INJECTION, SOLUTION SUBCUTANEOUS at 08:04

## 2023-01-01 RX ADMIN — MORPHINE SULFATE 4 MG: 4 INJECTION INTRAVENOUS at 06:04

## 2023-01-01 RX ADMIN — CEFTRIAXONE 2 G: 2 INJECTION, POWDER, FOR SOLUTION INTRAMUSCULAR; INTRAVENOUS at 09:04

## 2023-01-01 RX ADMIN — CEFTRIAXONE 1 G: 1 INJECTION, POWDER, FOR SOLUTION INTRAMUSCULAR; INTRAVENOUS at 09:04

## 2023-01-01 RX ADMIN — MORPHINE SULFATE 2 MG: 2 INJECTION, SOLUTION INTRAMUSCULAR; INTRAVENOUS at 09:04

## 2023-01-01 RX ADMIN — SODIUM BICARBONATE 650 MG TABLET 650 MG: at 04:04

## 2023-01-01 RX ADMIN — HEPARIN SODIUM 5000 UNITS: 5000 INJECTION INTRAVENOUS; SUBCUTANEOUS at 03:04

## 2023-01-01 RX ADMIN — VANCOMYCIN HYDROCHLORIDE 1000 MG: 1 INJECTION, POWDER, LYOPHILIZED, FOR SOLUTION INTRAVENOUS at 08:04

## 2023-01-01 RX ADMIN — INSULIN ASPART 2 UNITS: 100 INJECTION, SOLUTION INTRAVENOUS; SUBCUTANEOUS at 12:04

## 2023-01-01 RX ADMIN — LORAZEPAM 4 MG: 2 INJECTION INTRAMUSCULAR; INTRAVENOUS at 05:04

## 2023-01-01 RX ADMIN — LORAZEPAM 4 MG: 2 INJECTION INTRAMUSCULAR; INTRAVENOUS at 04:04

## 2023-01-01 RX ADMIN — MIDODRINE HYDROCHLORIDE 2.5 MG: 2.5 TABLET ORAL at 03:04

## 2023-01-01 RX ADMIN — INSULIN ASPART 1 UNITS: 100 INJECTION, SOLUTION INTRAVENOUS; SUBCUTANEOUS at 08:04

## 2023-01-01 RX ADMIN — MICONAZOLE NITRATE: 20 POWDER TOPICAL at 11:04

## 2023-01-01 RX ADMIN — TACROLIMUS 1 MG: 1 CAPSULE ORAL at 06:04

## 2023-01-01 RX ADMIN — MORPHINE SULFATE 4 MG: 4 INJECTION INTRAVENOUS at 05:04

## 2023-01-01 RX ADMIN — MORPHINE SULFATE 2 MG: 2 INJECTION, SOLUTION INTRAMUSCULAR; INTRAVENOUS at 01:04

## 2023-01-01 RX ADMIN — ONDANSETRON 4 MG: 4 TABLET, ORALLY DISINTEGRATING ORAL at 05:04

## 2023-01-01 NOTE — ED NOTES
Patient is a 2 day old male infant, delivered at Gestational Age: 38w3d Vaginal, Spontaneous [250].        [facility] is currently being fed Breast milk and formula.  I am aware that mother's feeding choice upon admission was the following:   Information for the patient's mother:  Hannah Macedo [[id number]]      There are no medical contraindications to exclusive breast milk feeding, and the benefits of exclusively breastfeeding were discussed/reinforced with the mother.    Feeding Tolerance:      Urine Occurrence: 1 (23 0730)  Stool Occurrence: 1 (23 0009)    Hearing exam:  Hearing Test Machine: Auditory Brainstem Response (Algo) (23)  [facility] Hearing Test Results: Pass R, Pass L (23)    Congenital Heart Disease Screening: Congenital Heart Disease Screening Result: Normal (23)    State Screen done, results pending    Immunizations   Most Recent Immunizations   Administered Date(s) Administered   • Hep B, adolescent or pediatric 2023       TCB Result: 8.1 (23)  Hours of age-Transcutaneous Biliribin: 24 Hrs    PHYSICAL EXAM    VITALS:   Visit Vitals  Pulse 136   Temp 98.9 °F (37.2 °C) (Axillary)   Resp 42   Ht 21\" (53.3 cm) Comment: Filed from Delivery Summary   Wt 3.28 kg (7 lb 3.7 oz) Comment: 7lbs 4oz   HC 36 cm (14.17\") Comment: Filed from Delivery Summary   BMI 11.53 kg/m²       Weight change since birth: -4%    General: Patient is an alert, vigorous male with appropriate behavior. He is in no acute distress.  Skin: His skin is warm with normal turgor. The color of the skin is pink. There is no rash. There are no bruises or other signs of injury. MIld jaundice to groin.  Head: The head is atraumatic and normocephalic. The anterior fontanel is open and flat; the posterior fontanel is open.  Eyes: The conjunctivae appear normal with neither icterus nor subconjunctival hemorrhage.   Red reflexes are seen bilaterally.  Ears: Pinnae and  Patient's sister, Kecia Williamson [phone], called to notify her of patient's discharge. Message left on voicemail to come  her brother and to please bring a change of clothing for patient to change into.    external ear canals normal.  Nose: There is no nasal flaring, nares patent bilaterally.  Throat:  The oropharynx is normal.  There is no cleft of the palate.  Neck: Clavicles without crepitus.  Trunk & Thorax: There are no lesions on the trunk; no sacral dimples. There are no retractions.  Lungs: The lung fields are clear to auscultation.  Heart: The precordium is quiet. The heart rhythm is grossly regular. S1 and S2 are normal. There are no murmurs. The femoral pulses are normal.  Abdomen: The umbilical cord stump is normal. There is not an umbilical hernia. The abdomen is flat and soft.   Genitalia: normal male genitalia with bilateral descended testes   Rectal: anus patent  Extremities: Moving all 4 extremities. The hips are stable.   Neurologic: He displays normal tone throughout. He is  Jittery  When agitated but calms down easily and he has normal  reflexes. [place]isbury reflex present.    Lab:  Cord Blood: No results found  No results found for: BILIRUBIN      ASSESSMENT: Well 2 day old male infant.  Normal growth and development.  Prenatal exposure to paxil/abilify in early pregnancy and   ZOloft and abilify until last week  Mild jitteriness- ? SSRI exposure.  Feeding well and voiding well  Procedures: None    Consults: None.    PLAN:  Routine  care.    Follow up: With Pediatrician in 1 days.     Instructions: Discharge teaching done on sleep position, fever, feedings and jaundice.  Baby will be discharged home with mom.         Signed by: Myriam Villagomez MD   2023

## 2023-01-19 NOTE — TELEPHONE ENCOUNTER
Faxed lab orders including directions to draw trough tacrolimus level. To be done   feb 23rd.. 1/19/23/sf

## 2023-01-19 NOTE — TELEPHONE ENCOUNTER
----- Message from Concha Fountain sent at 1/19/2023 10:07 AM CST -----  Contact: Meredith with [facility] phone [phone]  Calling to schedule an appointment, for HO renal transplant. Please call her. Thanks.

## 2023-03-22 NOTE — PROGRESS NOTES
[employer] Unattributed Report: Called pt to verify PCP, unable to reach, per outside notes Pt most likely in Nursing Home.

## 2023-03-22 NOTE — TELEPHONE ENCOUNTER
Left message letting pt know that the fax for his prosthetic was successfully sent to [facility] and to call back with any questions.  ----- Message from Clara Floyd MA sent at 2/22/2019  4:27 PM CST -----  Call [facility] about prosthetic.      Subjective:    Alberto Limon   68 y.o.   1953     Followup visit    Location - ear, nose    Quality - allergies, ear pain    Severity -  Mild/moderate    Duration - years    Timing - ongoing    Context - pt on IT, every other week now with concentrate, doing well nasal sore is better; having some left ear pain from her shoulder/neck pains    Modifying Features - allergies    Associated symptoms/signs -  Burning mouth    5/7/21 - 8 mos f/u pt is on biweekly IT overall doing well; still with occ left ear pain and soreness; she c/o occ itching under her skin on arms mostly    10/12/21 - 5 mos f/u - pt c/o \"not feeling well\" she is c/o left ear pain, more nasal congestion; not much drainage; went to urgent care got doxy and was told right ear perforation and fluid on left; side, not getting better    4/27/2022 -6-month follow-up; pt has overall been doing well on q2 weekly IT; no major flares of symptoms lately. She has been dealing with a sore around her left nostril. She needs more needles for injections. 1/11/23  1 week of sinus pressure PND, yellow drainage. OTCs not helping. On IT every 2 weeks still    3/22/2023  2-month follow-up - she finished off prior abx - was sick again 2 weeks ago went to PCP and got abx and steroid again. She is coughing less but still feels mucus in throat that she cannot clear. She is having more left ear pressure as well. Occ more sinus pressure on left    Review of Systems  Review of Systems   Constitutional:  Negative for chills and fever. HENT:  Positive for congestion, ear pain and tinnitus. Negative for hearing loss and nosebleeds. Eyes:  Negative for blurred vision and double vision. Respiratory:  Positive for shortness of breath. Negative for cough and sputum production. Cardiovascular:  Negative for chest pain and palpitations. Gastrointestinal:  Negative for heartburn, nausea and vomiting. Musculoskeletal:  Positive for joint pain.  Negative for neck pain. Skin:  Positive for itching. Neurological:  Positive for headaches. Negative for dizziness, speech change and weakness. Endo/Heme/Allergies:  Negative for environmental allergies. Does not bruise/bleed easily. Psychiatric/Behavioral:  Negative for memory loss. The patient does not have insomnia. 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 Otalgia 7/15/2020    Other and unspecified hyperlipidemia     Other chronic pulmonary heart diseases     Other pulmonary embolism and infarction     2007/08    Tremors of nervous system      Prior to Admission medications    Medication Sig Start Date End Date Taking? Authorizing Provider   methylPREDNISolone (MEDROL DOSEPACK) 4 mg tablet Take as directed on package 1/11/23   Erika Ferreira MD   hydrOXYzine HCL (ATARAX) 25 mg tablet TAKE ONE TABLET BY MOUTH THREE TIMES DAILY AS NEEDED FOR ITCHING 5/24/22   Erika Ferreira MD   Syringe with Needle, Disp, (Allergist Tray 1cc 27Gx3/8\") 1 mL 27 gauge x 3/8\" syrg 1 Syringe by Does Not Apply route Once every 2 weeks.  4/27/22   Erika Ferreira MD   mupirocin (BACTROBAN) 2 % ointment Apply to nasal skin twice daily 4/27/22   Erika Ferreira MD   pregabalin (LYRICA) 50 mg capsule Take one capsule by mouth THREE TIMES DAILY 10/15/21   Maverick Latham DPM   predniSONE (DELTASONE) 10 mg tablet 4 tab PO days 1-2   3 tab PO days 3-4     2 tab PO days 5-6      1 tab PO days 7-8 10/12/21   Erika Ferreira MD   betamethasone dipropionate (DIPROSONE) 0.05 % topical cream Apply  to affected area two (2) times a day. Provider, Historical   sucralfate (CARAFATE) 100 mg/mL suspension Take  by mouth four (4) times daily. Provider, Historical   carvediloL (COREG) 3.125 mg tablet Take  by mouth two (2) times daily (with meals). Provider, Historical   Cholestyramine-Aspartame (Cholestyramine Light) 4 gram powder Take  by mouth three (3) times daily (with meals). Provider, Historical   lactulose (CHRONULAC) 10 gram/15 mL solution Take  by mouth three (3) times daily. Provider, Historical   ivabradine (CORLANOR) 5 mg tablet Take  by mouth two (2) times daily (with meals). Provider, Historical   dexlansoprazole (Dexilant) 60 mg CpDB capsule (delayed release) Take  by mouth. Provider, Historical   diclofenac (VOLTAREN) 1 % gel Apply  to affected area four (4) times daily. Provider, Historical   etanercept (EnbreL) 50 mg/mL (1 mL) injection by SubCUTAneous route. Provider, Historical   sacubitriL-valsartan Edrie Shaker) 24-26 mg tablet Take 1 Tab by mouth two (2) times a day. Provider, Historical   famotidine (PEPCID) 20 mg tablet Take 20 mg by mouth two (2) times a day. Provider, Historical   furosemide (LASIX) 20 mg tablet Take  by mouth daily. Provider, Historical   hydrOXYchloroQUINE (PLAQUENIL) 200 mg tablet Take 200 mg by mouth daily. Provider, Historical   levothyroxine (SYNTHROID) 25 mcg tablet Take  by mouth Daily (before breakfast). Provider, Historical   meclizine (ANTIVERT) 25 mg tablet Take  by mouth three (3) times daily as needed for Dizziness. Provider, Historical   metoclopramide HCl (REGLAN) 5 mg tablet Take 5 mg by mouth Before breakfast, lunch, and dinner. Provider, Historical   montelukast (SINGULAIR) 10 mg tablet Take 10 mg by mouth daily. Provider, Historical   mirabegron ER (MYRBETRIQ) 50 mg ER tablet Take 50 mg by mouth daily.     Provider, Historical   ondansetron hcl (ZOFRAN) 4 mg tablet Take 4 mg by mouth every eight (8) hours as needed for Nausea or Vomiting. Provider, Historical   silver sulfADIAZINE (SILVADENE) 1 % topical cream Apply  to affected area daily. Provider, Historical   budesonide-formoteroL (SYMBICORT) 160-4.5 mcg/actuation HFAA Take 2 Puffs by inhalation. Provider, Historical   sucralfate (CARAFATE) 1 gram tablet Take 1 g by mouth four (4) times daily. Provider, Historical   traZODone (DESYREL) 50 mg tablet Take  by mouth nightly. Provider, Historical   triamcinolone acetonide (KENALOG) 0.1 % dental paste by Dental route two (2) times a day. Provider, Historical   triamcinolone acetonide (KENALOG) 0.1 % topical cream Apply  to affected area two (2) times a day. use thin layer    Provider, Historical   albuterol (PROVENTIL HFA, VENTOLIN HFA, PROAIR HFA) 90 mcg/actuation inhaler Take  by inhalation. Provider, Historical   rivaroxaban (XARELTO) 20 mg tab tablet Take  by mouth daily. Provider, Historical   lifitegrast (Xiidra) 5 % dpet Apply  to eye. Provider, Historical   DIOVAN 160 mg tablet TAKE ONE (1) TABLET, BY MOUTH, DAILY. 9/18/14   Eva Mcfarlane NP   cyclobenzaprine (FLEXERIL) 10 mg tablet Take  by mouth three (3) times daily as needed. 1/2 to 1 daily as needed    Provider, Historical   Hydrochlorothiazide 12.5 mg tablet Take 12.5 mg by mouth daily as needed. 4/25/13   Sean Platt MD   escitalopram oxalate (LEXAPRO) 10 mg tablet Take 20 mg by mouth daily. Provider, Historical   primidone (MYSOLINE) 50 mg tablet Take 250 mg by mouth two (2) times a day. Provider, Historical   zolpidem (AMBIEN) 10 mg tablet Take  by mouth nightly as needed. 1/2 to 1 at bedtime as needed    Provider, Historical   celecoxib (CELEBREX) 200 mg capsule Take  by mouth two (2) times a day. Provider, Historical   simvastatin (ZOCOR) 40 mg tablet Take  by mouth nightly.     Provider, Historical   lansoprazole (PREVACID) 30 mg capsule Take  by mouth Daily (before breakfast). Provider, Historical   CALCIUM CARB/VIT D3/MINERALS (CALCIUM-VITAMIN D PO) Take 50,000 mg by mouth every seven (7) days. Provider, Historical   aspirin 81 mg tablet Take 81 mg by mouth. Provider, Historical   allopurinol (ZYLOPRIM) 300 mg tablet Take 100 mg by mouth two (2) times a day. Provider, Historical            Objective:     Visit Vitals  BP (!) 140/76 (BP 1 Location: Left upper arm, BP Patient Position: Sitting, BP Cuff Size: Adult)   Pulse 84   Resp 17   Ht 5' 6\" (1.676 m)   Wt 210 lb (95.3 kg)   SpO2 99%   BMI 33.89 kg/m²        Physical Exam  Vitals reviewed. Constitutional:       General: She is awake. She is not in acute distress. Appearance: Normal appearance. She is well-groomed. She is obese. HENT:      Head: Normocephalic and atraumatic. Jaw: There is normal jaw occlusion. No trismus, tenderness or malocclusion. Salivary Glands: Right salivary gland is not diffusely enlarged or tender. Left salivary gland is not diffusely enlarged or tender. Right Ear: Hearing, tympanic membrane, ear canal and external ear normal. There is no impacted cerumen. Left Ear: Hearing, tympanic membrane, ear canal and external ear normal. There is no impacted cerumen. Nose: Septal deviation (Left inferior) present. No nasal deformity, laceration, nasal tenderness or mucosal edema. Right Nostril: No epistaxis. Left Nostril: No epistaxis. Right Turbinates: Not enlarged, swollen or pale. Left Turbinates: Not enlarged, swollen or pale. Right Sinus: No maxillary sinus tenderness or frontal sinus tenderness. Left Sinus: No maxillary sinus tenderness or frontal sinus tenderness. Mouth/Throat:      Lips: Pink. No lesions. Mouth: Mucous membranes are moist. No oral lesions. Dentition: Normal dentition. No dental caries. Tongue: No lesions. Palate: No mass and lesions. Pharynx: Oropharynx is clear.  Uvula midline. No oropharyngeal exudate or posterior oropharyngeal erythema. Tonsils: No tonsillar exudate. 0 on the right. 0 on the left. Eyes:      General: Vision grossly intact. Extraocular Movements: Extraocular movements intact. Right eye: No nystagmus. Left eye: No nystagmus. Conjunctiva/sclera: Conjunctivae normal.      Pupils: Pupils are equal, round, and reactive to light. Neck:      Thyroid: No thyroid mass, thyromegaly or thyroid tenderness. Trachea: Trachea and phonation normal. No tracheal tenderness or tracheal deviation. Cardiovascular:      Rate and Rhythm: Normal rate and regular rhythm. Pulmonary:      Effort: Pulmonary effort is normal. No respiratory distress. Breath sounds: No stridor. Musculoskeletal:         General: No swelling or tenderness. Normal range of motion. Cervical back: No edema or erythema. Lymphadenopathy:      Cervical: No cervical adenopathy. Skin:     General: Skin is warm and dry. Findings: No lesion or rash. Neurological:      General: No focal deficit present. Mental Status: She is alert and oriented to person, place, and time. Mental status is at baseline. Coordination: Romberg sign negative. Gait: Gait is intact. Psychiatric:         Mood and Affect: Mood normal.         Behavior: Behavior normal. Behavior is cooperative. Assessment/Plan:     Encounter Diagnoses   Name Primary? Allergic rhinitis due to pollen, unspecified seasonality Yes    ETD (Eustachian tube dysfunction), bilateral     Otalgia of left ear     Chronic maxillary sinusitis      Is overall doing well on IT, cont biweekly. If she wants to do weekly shots during allergy season this is fine. She has had back to back sinus infection and tx x 2 since Jan - we will get a CT to confirm if she still has infection or not.     Orders Placed This Encounter    CT MAXILLOFACIAL WO CONT

## 2023-04-05 PROBLEM — R65.21 SEPTIC SHOCK: Status: ACTIVE | Noted: 2018-09-18

## 2023-04-05 NOTE — ED PROVIDER NOTES
SCRIBE #1 NOTE: I, Shantel Reeves, am scribing for, and in the presence of, Fredi German MD. I have scribed the entire note.       History     Chief Complaint   Patient presents with    Emesis     Supposed to be discharged from [facility] today. Upon entering van to go home, patient vomited. Coughing x days, chest discomfort and body aches. Given 4mg zofran and 500cc IVF by [facility]     Review of patient's allergies indicates:  No Known Allergies      History of Present Illness     HPI    2023, 5:36 PM  History obtained from the patient and [facility]      History of Present Illness: Lavelle Ladd is a 67 y.o. male patient with a PMHx of DINORAH, CAD, CHF, COPD, GERD, HLD, HTN, and DM2 who presents to the Emergency Department for evaluation of emesis which onset PTA. Pt was scheduled to be discharged from [facility] [place] today, but vomited just before leaving. Found to be febrile, hypotensive, tachycardic per EMS. 500 cc NS given PTA. Symptoms are episodic and moderate in severity. No mitigating or exacerbating factors reported. Associated sxs include cough which onset a few days PTA, CP secondary to cough, SOB, sneezing, diarrhea, and generalized body aches. Patient denies any headaches, abdominal pain, flank pain, bloody stool, dysuria, hematuria, and all other sxs at this time. Prior Tx 4mg Zofran and 500cc IVF administered by [facility]. No further complaints or concerns at this time.       Arrival mode: [facility]    PCP: Primary Doctor No        Past Medical History:  Past Medical History:   Diagnosis Date    DINORAH (acute kidney injury) 2016    Arthritis     CAD in native artery 2019    CHF (congestive heart failure)     Chronic obstructive pulmonary disease 2016    Coronary artery disease involving native coronary artery of native heart without angina pectoris 2016    CRI (chronic renal insufficiency) 2019     donor kidney transplant for DM 16     Induction with Thymo x3 and IV  solumedrol to total 875mg  Kidney Biopsy  5/31/2016: 16 glomeruli, ACR type 1 AVR type 2, significant microcirculatory changes, c4d negative, No DSA, 5 to10% fibrosis. Treated with thymo x8 6/21/2016- no rejection      Diastolic heart failure     Encounter for blood transfusion     ESRD on RRT since 10/2013 10/29/2013    Biopsy proven diabetic nephropathy and lymphoplasmacytic interstitial infiltrate not c/w with AIN (ddx sjogrens or assoc with tamm-horsefall protein extravasation)     GERD (gastroesophageal reflux disease)     History of hepatitis C, s/p successful Rx w/ SVR12 - 4/2017 4/5/2017    Completed 12 weeks harvoni w/ SVR    Hyperlipidemia     Hypertension     PAD (peripheral artery disease) 7/21/2019    PIC line (peripherally inserted central catheter) flush     Prophylactic immunotherapy     Proteinuria     PVD (peripheral vascular disease) 6/26/2017    RLE BKA CT 12/11/16 Extensive atherosclerotic disease of the aorta and mesenteric arteries.     Renal hypertension     Type 2 diabetes mellitus with diabetic neuropathy, with long-term current use of insulin 12/1/2016    Vitamin B12 deficiency        Past Surgical History:  Past Surgical History:   Procedure Laterality Date    ANGIOGRAPHY OF LOWER EXTREMITY N/A 9/23/2022    Procedure: ANGIOGRAM, LOWER EXTREMITY/left leg;  Surgeon: Donal Mcdonald MD;  Location: [facility] CATH LAB;  Service: Cardiovascular;  Laterality: N/A;    ANGIOGRAPHY OF LOWER EXTREMITY N/A 9/29/2022    Procedure: ANGIOGRAM, LOWER EXTREMITY/left leg;  Surgeon: Donal Mcdonald MD;  Location: [facility] CATH LAB;  Service: Peripheral Vascular;  Laterality: N/A;  resched from 9/23 pt ready now    AORTOGRAPHY WITH SERIALOGRAPHY N/A 6/14/2018    Procedure: LEFT LEG ANGIOGRAM;  Surgeon: Donal Mcdonald MD;  Location: [facility] CATH LAB;  Service: Vascular;  Laterality: N/A;    APPLICATION OF LARGE EXTERNAL FIXATION DEVICE TO TIBIA Left 8/4/2022    Procedure: APPLICATION, EXTERNAL FIXATION DEVICE, LARGE, TIBIA;   Surgeon: Good Villa MD;  Location: [facility] OR;  Service: Orthopedics;  Laterality: Left;    av bovine graft      Left UE    AV FISTULA PLACEMENT      left UE    BELOW KNEE AMPUTATION OF LOWER EXTREMITY Left 10/6/2022    Procedure: AMPUTATION, BELOW KNEE;  Surgeon: Donal Mcdonald MD;  Location: [facility];  Service: Vascular;  Laterality: Left;    CARDIAC CATHETERIZATION  02/2015    CLOSED REDUCTION OF INJURY OF TIBIA Left 8/4/2022    Procedure: CLOSED REDUCTION, TIBIA;  Surgeon: Good Villa MD;  Location: [facility] OR;  Service: Orthopedics;  Laterality: Left;  Closed reduction left tibial and fibular shaft fractures    CLOSURE OF WOUND Left 9/24/2018    Procedure: CLOSURE, WOUND;  Surgeon: Karla Wheeler DPM;  Location: [facility] OR;  Service: Podiatry;  Laterality: Left;  Secondary Wound closure, extensive    CLOSURE OF WOUND Left 11/5/2018    Procedure: CLOSURE, WOUND;  Surgeon: Karla Wheeler DPM;  Location: [facility] OR;  Service: Podiatry;  Laterality: Left;    DEBRIDEMENT OF MULTIPLE METATARSAL BONES Left 11/5/2018    Procedure: DEBRIDEMENT, METATARSAL BONE, 2 OR MORE BONES;  Surgeon: Karla Wheeler DPM;  Location: [facility] OR;  Service: Podiatry;  Laterality: Left;    EXCISION OF SKIN Left 9/27/2019    Procedure: EXCISION, SKIN;  Surgeon: Lenard Alarcon MD;  Location: [address];  Service: Plastics;  Laterality: Left;  Plastics set, NIMS monitor, [employer]    FOOT AMPUTATION THROUGH METATARSAL Left 9/21/2018    Procedure: AMPUTATION, FOOT, TRANSMETATARSAL;  Surgeon: Karla Wheeler DPM;  Location: [facility] OR;  Service: Podiatry;  Laterality: Left;    FOOT AMPUTATION THROUGH METATARSAL Left 10/31/2018    Procedure: AMPUTATION, FOOT, TRANSMETATARSAL;  Surgeon: Karla Wheeler DPM;  Location: [facility] OR;  Service: Podiatry;  Laterality: Left;    FOOT AMPUTATION THROUGH METATARSAL Left 11/5/2018    Procedure: AMPUTATION, FOOT, TRANSMETATARSAL;  Surgeon: Karla Wheeler DPM;  Location: [facility] OR;  Service:  Podiatry;  Laterality: Left;  revisional transmetatarsal amputation, Left foot    IRRIGATION AND DEBRIDEMENT OF LOWER EXTREMITY Left 10/31/2018    Procedure: IRRIGATION AND DEBRIDEMENT, LOWER EXTREMITY;  Surgeon: Karla Wheeler DPM;  Location: [facility] OR;  Service: Podiatry;  Laterality: Left;    KIDNEY TRANSPLANT  05/21/2016    LEFT HEART CATHETERIZATION Left 7/21/2019    Procedure: CATHETERIZATION, HEART, LEFT;  Surgeon: Andrew Valdez MD;  Location: [facility] CATH LAB;  Service: Cardiology;  Laterality: Left;    LEG AMPUTATION THROUGH KNEE  2011    right LE, started as nail puncture leading to diabetic ulcer    REMOVAL OF EXTERNAL FIXATION DEVICE Left 9/17/2022    Procedure: REMOVAL, EXTERNAL FIXATION DEVICE;  Surgeon: Kathleen Zazueta MD;  Location: [facility] OR;  Service: Orthopedics;  Laterality: Left;    SKIN FULL THICKNESS GRAFT Left 10/7/2019    Procedure: APPLICATION, GRAFT, SKIN, FULL-THICKNESS;  Surgeon: Lenard Alarcon MD;  [address];  Service: Plastics;  Laterality: Left;    SURGICAL REMOVAL OF LESION OF FACE Right 10/7/2019    Procedure: EXCISION, LESION, FACE;  Surgeon: Lenard Alarcon MD;  Location: [facility] OR [facility];  Service: Plastics;  Laterality: Right;         Family History:  Family History   Problem Relation Age of Onset    Cancer Father     Diabetes Father     Heart failure Father     Stroke Father     Heart failure Mother     Kidney disease Neg Hx        Social History:  Social History     Tobacco Use    Smoking status: Former     Packs/day: 1.00     Years: 40.00     Pack years: 40.00     Types: Cigarettes     Quit date: 1/11/2013     Years since quitting: 10.2    Smokeless tobacco: Never   Substance and Sexual Activity    Alcohol use: Yes     Alcohol/week: 6.0 standard drinks     Types: 6 Cans of beer per week     Comment: seldom    Drug use: No    Sexual activity: Never        Review of Systems     Review of Systems   Constitutional:  Positive for fever.   HENT:  Negative for sore  throat.    Respiratory:  Positive for cough and shortness of breath.         [+] sneezing   Cardiovascular:  Positive for chest pain (secondary to cough).   Gastrointestinal:  Positive for diarrhea, nausea and vomiting. Negative for abdominal pain and blood in stool.   Genitourinary:  Negative for dysuria, flank pain and hematuria.   Musculoskeletal:  Negative for back pain.   Skin:  Negative for rash.   Neurological:  Negative for weakness and headaches.        [+] generalized body aches   Hematological:  Does not bruise/bleed easily.   All other systems reviewed and are negative.     Physical Exam     Initial Vitals [04/05/23 1731]   BP Pulse Resp Temp SpO2   99/70 (!) 120 20 (!) 101.3 °F (38.5 °C) 96 %      MAP       --          Physical Exam  Nursing Notes and Vital Signs Reviewed.  Constitutional: Patient is in mild distress. Well-developed and well-nourished.  Head: Atraumatic. Normocephalic.  Eyes: PERRL. EOM intact. Conjunctivae are not pale. No scleral icterus.  ENT: Mucous membranes are moist. Oropharynx is clear and symmetric.    Neck: Supple. Full ROM. No lymphadenopathy.  Cardiovascular: Tachycardia. Regular rhythm. No murmurs, rubs, or gallops. Distal pulses are 2+ and symmetric. LUE shunt with trill and bruit.  Pulmonary/Chest: Clear to auscultation bilaterally. No wheezing or rales.  Abdominal: Soft and non-distended.  There is no tenderness.  No rebound, guarding, or rigidity. Good bowel sounds.  Genitourinary: No CVA tenderness  Musculoskeletal: Bilateral BKA. RLE stump with chronic ulcerative wound with fibrous and purulent exudate, foul smelling.    Skin: Warm and dry. Sacral wound present.   Neurological:  Alert, awake, and appropriate.  Normal speech.  No acute focal neurological deficits are appreciated.  Psychiatric: Normal affect. Good eye contact. Appropriate in content.     ED Course   Critical Care    Date/Time: 4/5/2023 9:13 PM  Performed by: Fredi German MD  Authorized by: Fredi  MD Maxi   Direct patient critical care time: 12 minutes  Additional history critical care time: 4 minutes  Ordering / reviewing critical care time: 4 minutes  Documentation critical care time: 5 minutes  Consulting other physicians critical care time: 6 minutes  Consult with family critical care time: 4 minutes  Total critical care time (exclusive of procedural time) : 35 minutes  Critical care time was exclusive of separately billable procedures and treating other patients and teaching time.  Critical care was necessary to treat or prevent imminent or life-threatening deterioration of the following conditions: sepsis.  Critical care was time spent personally by me on the following activities: blood draw for specimens, development of treatment plan with patient or surrogate, discussions with consultants, interpretation of cardiac output measurements, evaluation of patient's response to treatment, examination of patient, obtaining history from patient or surrogate, ordering and performing treatments and interventions, ordering and review of laboratory studies, ordering and review of radiographic studies, pulse oximetry, re-evaluation of patient's condition and review of old charts.      Central Line    Date/Time: 4/5/2023 9:38 PM  Performed by: Fredi German MD  Authorized by: Jose Arcos MD     Location procedure was performed:  [facility] EMERGENCY DEPARTMENT  Consent Done ?:  Yes  Time out complete?: Verified correct patient, procedure, equipment, staff, and site/side    Indications:  Med administration and vascular access  Anesthesia:  Local infiltration  Local anesthetic:  Lidocaine 1% without epinephrine  Anesthetic total (ml):  5  Preparation:  Skin prepped with ChloraPrep  Skin prep agent dried: Skin prep agent completely dried prior to procedure    Sterile barriers: All five maximal sterile barriers used - gloves, gown, cap, mask and large sterile sheet    Hand hygiene: Hand hygiene performed  immediately prior to central venous catheter insertion    Location:  Right internal jugular  Catheter type:  Triple lumen  Catheter size:  7 Fr  Inserted Catheter Length (cm):  16  Ultrasound guidance: Yes    Vessel Caliber:  Large   patent  Comprressibility:  Normal  Doppler:  Not done  Needle advanced into vessel with real time ultrasound guidance.    Guidewire confirmed in vessel.    Steril sheath on probe.    Sterile gel used.  Manometry: No    Number of attempts:  1  Securement:  Line sutured, chlorhexidine patch, sterile dressing applied and blood return through all ports  Complications: No    Specimens: No    Implants: No    XRay:  Placement verified by x-ray, no pneumothorax on x-ray, successful placement and tip termination  Adverse Events:  NoneTermination Site: superior vena cava  ED Vital Signs:  Vitals:    04/06/23 0015 04/06/23 0030 04/06/23 0045 04/06/23 0059   BP: (!) 84/53 (!) 95/51     Pulse: (!) 112 (!) 111 (!) 115    Resp: 16 15 (!) 23 (!) 30   Temp:       TempSrc:       SpO2: (!) 93% 95% 96%    Weight:        04/06/23 0100 04/06/23 0115 04/06/23 0130 04/06/23 0138   BP: (!) 110/56 115/62 (!) 95/52    Pulse: (!) 112 110 108 108   Resp: 19 (!) 46 (!) 25 (!) 28   Temp: (!) 102.4 °F (39.1 °C)   (!) 102.5 °F (39.2 °C)   TempSrc: Oral      SpO2: (!) 90% 95% 95% 95%   Weight:        04/06/23 0145 04/06/23 0200 04/06/23 0215 04/06/23 0230   BP: (!) 88/53 122/61 (!) 101/56 (!) 102/58   Pulse: 107 109 107 105   Resp: (!) 31 (!) 24 (!) 27 (!) 28   Temp:       TempSrc:       SpO2: 96% 96% 96% 97%   Weight:        04/06/23 0245 04/06/23 0300 04/06/23 0305   BP: (!) 94/51 (!) 98/53 (!) 98/53   Pulse: 103 101 101   Resp: (!) 23 (!) 23 (!) 22   Temp:  (!) 101 °F (38.3 °C)    TempSrc:  Oral    SpO2: 97% 96% 97%   Weight:          Abnormal Lab Results:  Labs Reviewed   CBC W/ AUTO DIFFERENTIAL - Abnormal; Notable for the following components:       Result Value    RBC 3.87 (*)     Hemoglobin 10.0 (*)      Hematocrit 32.1 (*)     MCH 25.8 (*)     MCHC 31.2 (*)     RDW 15.1 (*)     Gran # (ANC) 9.4 (*)     Immature Grans (Abs) 0.05 (*)     Lymph # 0.8 (*)     Mono # 0.2 (*)     Gran % 88.9 (*)     Lymph % 7.9 (*)     Mono % 2.2 (*)     All other components within normal limits   COMPREHENSIVE METABOLIC PANEL - Abnormal; Notable for the following components:    Sodium 131 (*)     CO2 16 (*)     BUN 34 (*)     Creatinine 2.2 (*)     Albumin 2.6 (*)     Alkaline Phosphatase 164 (*)     AST 46 (*)     eGFR 32 (*)     All other components within normal limits   LACTIC ACID, PLASMA - Abnormal; Notable for the following components:    Lactate (Lactic Acid) 2.7 (*)     All other components within normal limits   LACTIC ACID, PLASMA - Abnormal; Notable for the following components:    Lactate (Lactic Acid) 2.3 (*)     All other components within normal limits   B-TYPE NATRIURETIC PEPTIDE - Abnormal; Notable for the following components:     (*)     All other components within normal limits   PROCALCITONIN - Abnormal; Notable for the following components:    Procalcitonin 7.03 (*)     All other components within normal limits   TROPONIN I   URINALYSIS, REFLEX TO URINE CULTURE   SARS-COV-2 RDRP GENE   POCT INFLUENZA A/B MOLECULAR   POCT GLUCOSE MONITORING CONTINUOUS        All Lab Results:  Results for orders placed or performed during the hospital encounter of 04/05/23   CBC auto differential   Result Value Ref Range    WBC 10.57 3.90 - 12.70 K/uL    RBC 3.87 (L) 4.60 - 6.20 M/uL    Hemoglobin 10.0 (L) 14.0 - 18.0 g/dL    Hematocrit 32.1 (L) 40.0 - 54.0 %    MCV 83 82 - 98 fL    MCH 25.8 (L) 27.0 - 31.0 pg    MCHC 31.2 (L) 32.0 - 36.0 g/dL    RDW 15.1 (H) 11.5 - 14.5 %    Platelets 271 150 - 450 K/uL    MPV 10.3 9.2 - 12.9 fL    Immature Granulocytes 0.5 0.0 - 0.5 %    Gran # (ANC) 9.4 (H) 1.8 - 7.7 K/uL    Immature Grans (Abs) 0.05 (H) 0.00 - 0.04 K/uL    Lymph # 0.8 (L) 1.0 - 4.8 K/uL    Mono # 0.2 (L) 0.3 - 1.0 K/uL     Eos # 0.0 0.0 - 0.5 K/uL    Baso # 0.03 0.00 - 0.20 K/uL    nRBC 0 0 /100 WBC    Gran % 88.9 (H) 38.0 - 73.0 %    Lymph % 7.9 (L) 18.0 - 48.0 %    Mono % 2.2 (L) 4.0 - 15.0 %    Eosinophil % 0.2 0.0 - 8.0 %    Basophil % 0.3 0.0 - 1.9 %    Differential Method Automated    Comprehensive metabolic panel   Result Value Ref Range    Sodium 131 (L) 136 - 145 mmol/L    Potassium 4.5 3.5 - 5.1 mmol/L    Chloride 102 95 - 110 mmol/L    CO2 16 (L) 23 - 29 mmol/L    Glucose 92 70 - 110 mg/dL    BUN 34 (H) 8 - 23 mg/dL    Creatinine 2.2 (H) 0.5 - 1.4 mg/dL    Calcium 9.0 8.7 - 10.5 mg/dL    Total Protein 7.5 6.0 - 8.4 g/dL    Albumin 2.6 (L) 3.5 - 5.2 g/dL    Total Bilirubin 0.4 0.1 - 1.0 mg/dL    Alkaline Phosphatase 164 (H) 55 - 135 U/L    AST 46 (H) 10 - 40 U/L    ALT 31 10 - 44 U/L    Anion Gap 13 8 - 16 mmol/L    eGFR 32 (A) >60 mL/min/1.73 m^2   Lactic acid, plasma #1   Result Value Ref Range    Lactate (Lactic Acid) 2.7 (H) 0.5 - 2.2 mmol/L   Lactic acid, plasma #2   Result Value Ref Range    Lactate (Lactic Acid) 2.3 (H) 0.5 - 2.2 mmol/L   Brain natriuretic peptide   Result Value Ref Range     (H) 0 - 99 pg/mL   Troponin I   Result Value Ref Range    Troponin I 0.018 0.000 - 0.026 ng/mL   Procalcitonin   Result Value Ref Range    Procalcitonin 7.03 (H) <0.25 ng/mL   Hemoglobin A1c if not done in past 3 months   Result Value Ref Range    Hemoglobin A1C 6.7 (H) 4.0 - 5.6 %    Estimated Avg Glucose 146 (H) 68 - 131 mg/dL   POCT COVID-19 Rapid Screening   Result Value Ref Range    POC Rapid COVID Negative Negative     Acceptable Yes    POCT Influenza A/B Molecular   Result Value Ref Range    POC Molecular Influenza A Ag Negative Negative, Not Reported    POC Molecular Influenza B Ag Negative Negative, Not Reported     Acceptable Yes    POCT glucose   Result Value Ref Range    POCT Glucose 118 (H) 70 - 110 mg/dL     *Note: Due to a large number of results and/or encounters for the  requested time period, some results have not been displayed. A complete set of results can be found in Results Review.           Imaging Results:  Imaging Results              X-Ray Chest 1 View (Final result)  Result time 04/05/23 22:18:38      Final result by Herbert Perez MD (04/05/23 22:18:38)                   Impression:      Right IJ central venous catheter in good position.    Lungs are clear.      Electronically signed by: Herbert Perez  Date:    04/05/2023  Time:    22:18               Narrative:    EXAMINATION:  XR CHEST 1 VIEW    CLINICAL HISTORY:  Encounter for adjustment and management of vascular access device    TECHNIQUE:  Single frontal view of the chest was performed.    COMPARISON:  Multiple priors.    FINDINGS:  Right IJ central venous catheter in good position with tip over the cavoatrial junction.  No pneumothorax or pleural effusion.    The lungs are clear, with normal appearance of pulmonary vasculature and no pleural effusion or pneumothorax.    The cardiac silhouette is normal in size. The hilar and mediastinal contours are unremarkable.    Bones are intact.                                        X-Ray Tibia Fibula 2 View Right (Final result)  Result time 04/05/23 18:51:59      Final result by Herbert Perez MD (04/05/23 18:51:59)                   Impression:      As above.    This report was flagged in Epic as abnormal.      Electronically signed by: Herbert Perez  Date:    04/05/2023  Time:    18:51               Narrative:    EXAMINATION:  XR TIBIA FIBULA 2 VIEW RIGHT    CLINICAL HISTORY:  Pain in right leg    TECHNIQUE:  AP and lateral views of the right tibia and fibula were performed.    COMPARISON:  None.    FINDINGS:  Status post below the knee amputation.    Osseous margins are distally maintained.  Question minimal anterior tibial irregularity seen only on lateral view.  If there is concern for infection, further evaluation would be suggested with CT.    Soft tissue  irregularity distally suspected                                       X-Ray Chest AP Portable (Final result)  Result time 04/05/23 17:52:52      Final result by Corie Rodriguez MD (04/05/23 17:52:52)                   Impression:      No active finding      Electronically signed by: Corie Rodriguez  Date:    04/05/2023  Time:    17:52               Narrative:    EXAMINATION:  XR CHEST AP PORTABLE    CLINICAL HISTORY:  Sepsis;    TECHNIQUE:  Single frontal portable view of the chest was performed.    COMPARISON:  October 2022    FINDINGS:  Lungs are well aerated.  No shift of the mediastinum                                       The EKG was ordered, reviewed, and independently interpreted by the ED provider.  Interpretation time: 18:00  Rate: 117 BPM  Rhythm: sinus tachycardia  Interpretation: Low voltage QRS. Borderline ECG. No STEMI.           The Emergency Provider reviewed the vital signs and test results, which are outlined above.     ED Discussion     9:05 PM: Discussed case with Ashley Tavarez NP (Hospital Medicine) She recommended ICU level care. Discussed with Pennie Silva NP ([facility]). Dr. Arcos agrees with current care and management of pt and accepts admission.   Admitting Service: Hospital Medicine  Admitting Physician: Dr. Arcos  Admit to: ICU    9:05 PM: Re-evaluated pt. I have discussed test results, shared treatment plan, and the need for admission with patient and family at bedside. Pt and family express understanding at this time and agree with all information. All questions answered. Pt and family have no further questions or concerns at this time. Pt is ready for admit.          ED Course as of 04/06/23 0331   Wed Apr 05, 2023   2105 30mL/kg IVF have been administered within 3 hours of identification of SIRS criteria. Vital signs were reviewed and a focal sepsis perfusion assessment was performed. Suspicious for septic shock, starting vasopressors.     [BA]      ED Course User Index  [BA]  Fredi German MD     Medical Decision Making:   Clinical Tests:   Lab Tests: Ordered and Reviewed  Radiological Study: Ordered and Reviewed  Medical Tests: Ordered and Reviewed         ED Medication(s):  Medications   vancomycin - pharmacy to dose (has no administration in time range)   NORepinephrine 4 mg in dextrose 5% 250 mL infusion (premix) (titrating) (0.02 mcg/kg/min × 84.6 kg Intravenous Verify Only 4/6/23 0300)   mupirocin 2 % ointment ( Nasal Given 4/5/23 2310)   famotidine tablet 20 mg (has no administration in time range)   heparin (porcine) injection 5,000 Units (5,000 Units Subcutaneous Given 4/5/23 2310)   glucagon (human recombinant) injection 1 mg (has no administration in time range)   dextrose 10% bolus 125 mL 125 mL (has no administration in time range)   dextrose 10% bolus 250 mL 250 mL (has no administration in time range)   insulin aspart U-100 pen 1-10 Units (has no administration in time range)   ondansetron injection 4 mg (4 mg Intravenous Given 4/6/23 0110)   cefTRIAXone (ROCEPHIN) 1 g in dextrose 5 % in water (D5W) 5 % 50 mL IVPB (MB+) (has no administration in time range)   gabapentin capsule 300 mg (300 mg Oral Given 4/5/23 2310)   traMADoL tablet 50 mg (50 mg Oral Given 4/5/23 2310)   acetaminophen tablet 650 mg (650 mg Oral Given 4/6/23 0138)   lactated ringers bolus 1,000 mL (0 mLs Intravenous Stopped 4/5/23 1902)   acetaminophen tablet 650 mg (650 mg Oral Given 4/5/23 1803)   ondansetron disintegrating tablet 4 mg (4 mg Oral Given 4/5/23 1759)   cefTRIAXone (ROCEPHIN) 1 g in dextrose 5 % in water (D5W) 5 % 50 mL IVPB (MB+) (0 g Intravenous Stopped 4/5/23 2130)   lactated ringers bolus 1,500 mL (0 mLs Intravenous Stopped 4/5/23 2035)   vancomycin 1.75 g in 5 % dextrose 500 mL IVPB (0 mg Intravenous Stopped 4/5/23 2149)   morphine injection 2 mg (2 mg Intravenous Given 4/6/23 0059)       Current Discharge Medication List                  Scribe Attestation:   Scribe #1: I performed  the above scribed service and the documentation accurately describes the services I performed. I attest to the accuracy of the note.     Attending:   Physician Attestation Statement for Scribe #1: I, Fredi German MD, personally performed the services described in this documentation, as scribed by Shantel Reeves, in my presence, and it is both accurate and complete.           Clinical Impression       ICD-10-CM ICD-9-CM   1. Sepsis, due to unspecified organism, unspecified whether acute organ dysfunction present  A41.9 038.9     995.91   2. Fever  R50.9 780.60   3. Right leg pain  M79.604 729.5   4. Infection of amputation stump  T87.40 997.62   5. Septic shock  A41.9 038.9    R65.21 785.52     995.92   6. Encounter for central line placement  Z45.2 V58.81       Disposition:   Disposition: Admitted  Condition: Serious       Fredi German MD  04/06/23 0331

## 2023-04-05 NOTE — Clinical Note
Diagnosis: Fever [698407]   Admitting Provider:: DEBBIE LOBO [[id number]]   Future Attending Provider: DEBBIE LOBO [[id number]]   Reason for IP Medical Treatment  (Clinical interventions that can only be accomplished in the IP setting? ) :: Severe sepsis   I certify that Inpatient services for greater than or equal to 2 midnights are medically necessary:: No   Plans for Post-Acute care--if anticipated (pick the single best option):: A. No post acute care anticipated at this time   Special Needs:: No Special Needs [1]

## 2023-04-06 NOTE — ASSESSMENT & PLAN NOTE
Prograf, cellcept  Send levels  Hold overnight with septic shock  Consulting nephrology to help guide therapy

## 2023-04-06 NOTE — ASSESSMENT & PLAN NOTE
On cellcept and prograf  Baseline creatinine 1.2; admit 2.2  Likely s/t septic shock; support for MAP > 65  Renal dose medications  Consult nephrology in am

## 2023-04-06 NOTE — HOSPITAL COURSE
4/6: remains on low dose levo at 0.02, pt with c/o pain to R stump this AM, with diarrhea - will check a c diff, resp stable on RA, pending nephro and ID consults   4/7: continues on low dose levo, C diff +, multiple organisms ID'd on blood rapid PCR, pt with no new issues or c/o  4/8: off levo since yesterday afternoon, blood cutlures with proteus and group B strep, R leg would culture with GNR, no new issues or c/o, stable for transfer out of the ICU to the floor

## 2023-04-06 NOTE — ASSESSMENT & PLAN NOTE
- ? osteomyelitis to R stump  - also with diarrhea, ? c diff, stool testing ordered   - follow culture data, continue empiric abx   - continue IVF, allow PO intake  - continue levo for MAP goal > 65

## 2023-04-06 NOTE — PROGRESS NOTES
Pharmacokinetic Assessment Follow Up: IV Vancomycin    Vancomycin serum concentration assessment(s):    The random level was drawn correctly and can be used to guide therapy at this time. The measurement is below the desired definitive target range of 15 to 20 mcg/mL.    Vancomycin Regimen Plan:    Will give a 1250mg x1 dose now.   Re-dose when the random level is less than 20 mcg/mL, next level to be drawn at 1000 on 4/7    Drug levels (last 3 results):  Recent Labs   Lab Result Units 04/06/23  1442   Vancomycin, Random ug/mL 7.0       Pharmacy will continue to follow and monitor vancomycin.    Please contact pharmacy at extension [phone] for questions regarding this assessment.    Thank you for the consult,   Indira Malagon       Patient brief summary:  Lavelle Ladd is a 67 y.o. male initiated on antimicrobial therapy with IV Vancomycin for treatment of bacteremia    The patient's current regimen is pulse dosing     Drug Allergies:   Review of patient's allergies indicates:  No Known Allergies    Actual Body Weight:   84.6kg    Renal Function:   Estimated Creatinine Clearance: 30.9 mL/min (A) (based on SCr of 2.4 mg/dL (H)).,     Dialysis Method (if applicable):  N/A    CBC (last 72 hours):  Recent Labs   Lab Result Units 04/05/23 1758 04/05/23  2303 04/06/23  0412   WBC K/uL 10.57  --  18.84*   Hemoglobin g/dL 10.0*  --  8.5*   Hemoglobin A1C %  --  6.7*  --    Hematocrit % 32.1*  --  27.8*   Platelets K/uL 271  --  250   Gran % % 88.9*  --  69.0   Lymph % % 7.9*  --  3.0*   Mono % % 2.2*  --  1.0*   Eosinophil % % 0.2  --  0.0   Basophil % % 0.3  --  0.0   Differential Method  Automated  --  Manual       Metabolic Panel (last 72 hours):  Recent Labs   Lab Result Units 04/05/23 1758 04/06/23  0412 04/06/23  0940 04/06/23  1123   Sodium mmol/L 131* 129*  --   --    Potassium mmol/L 4.5 5.0  --   --    Chloride mmol/L 102 100  --   --    CO2 mmol/L 16* 19*  --   --    Glucose mg/dL 92 103  --   --     Glucose, UA   --   --  Negative  --    BUN mg/dL 34* 35*  --   --    Creatinine mg/dL 2.2* 2.4*  --   --    Creatinine, Urine mg/dL  --   --  62.6  --    Albumin g/dL 2.6*  --   --   --    Total Bilirubin mg/dL 0.4  --   --   --    Alkaline Phosphatase U/L 164*  --   --   --    AST U/L 46*  --   --   --    ALT U/L 31  --   --   --    Magnesium mg/dL  --  1.2*  --   --    Phosphorus mg/dL  --  3.4  --  3.9       Vancomycin Administrations:  vancomycin given in the last 96 hours                     vancomycin SolR 125 mg (mg) 125 mg Given 04/06/23 1744    vancomycin 1,250 mg in dextrose 5 % (D5W) 250 mL IVPB (Vial-Mate) (mg) 1,250 mg New Bag 04/06/23 1604    vancomycin 1.75 g in 5 % dextrose 500 mL IVPB (mg) 1,750 mg New Bag 04/05/23 1949                    Microbiologic Results:  Microbiology Results (last 7 days)       Procedure Component Value Units Date/Time    Culture, Anaerobe [[id number]] Collected: 04/06/23 1707    Order Status: Sent Specimen: Wound from Leg, Right Updated: 04/06/23 1730    Aerobic culture [[id number]] Collected: 04/06/23 1707    Order Status: Sent Specimen: Wound from Leg, Right Updated: 04/06/23 1730    C Diff Toxin by PCR [[id number]] Collected: 04/06/23 1145    Order Status: No result Updated: 04/06/23 1621    Clostridium difficile EIA [[id number]]  (Abnormal) Collected: 04/06/23 1145    Order Status: Completed Specimen: Stool Updated: 04/06/23 1621     C. diff Antigen Positive     C difficile Toxins A+B, EIA Negative     Comment: Testing not recommended for children <24 months old.       Blood culture x two cultures. Draw prior to antibiotics. [[id number]] Collected: 04/05/23 1757    Order Status: Completed Specimen: Blood from Peripheral, Antecubital, Right Updated: 04/06/23 1604     Blood Culture, Routine Gram stain susan bottle: Gram negative rods, Gram positive cocci      Results called to and read back by: Ashley Nails RN 04/06/2023  14:48      Gram stain aer bottle: Gram positive cocci  and Gram negative rods      Positive results previously called 04/06/2023    Narrative:      Aerobic and anaerobic    Rapid Organism ID by PCR (from Blood culture) [[id number]]  (Abnormal) Collected: 04/05/23 2199    Order Status: Completed Updated: 04/06/23 6934     Enterococcus faecalis Detected     Enterococcus faecium Not Detected     Listeria Monocytogenes Not Detected     Staphylococcus spp. Not Detected     Staphylococcus aureus Not Detected     Staphylococcus epidermidis Not Detected     Staphylococcus lugdunensis Not Detected     Streptococcus species See species for ID     Streptococcus agalactiae Detected     Streptococcus pneumoniae Not Detected     Streptococcus pyogenes Not Detected     Acinetobacter calcoaceticus/baumannii complex Not Detected     Bacteroides fragilis Not Detected     Enterobacerales See species for ID     Enterobacter cloacae complex Not Detected     Escherichia Not Detected     Klebsiella aerogenes Not Detected     Klebsiella oxytoca Not Detected     Klebsiella pneumoniae group Not Detected     Proteus Detected     Salmonella sp Not Detected     Serratia marcescens Not Detected     Haemophilus influenzae Not Detected     Neisseria meningtidis Not Detected     Pseudomonas aeruginosa Not Detected     Stenotrophomonas maltophilia Not Detected     Candida albicans Not Detected     Candida auris Not Detected     Candida glabrata Not Detected     Candida krusei Not Detected     Candida parapsilosis Not Detected     Candida tropicalis Not Detected     Cryptococcus neoformans/gattii Not Detected     CTX-M (ESBL ) Not Detected     IMP (Carbapenem resistant) Not Detected     KPC resistance gene (Carbapenem resistant) Not Detected     mcr-1  Test Not Applicable     mec A/C  Test Not Applicable     mec A/C and MREJ (MRSA) gene Test Not Applicable     NDM (Carbapenem resistant) Not Detected     OXA-48-like (Carbapenem resistant) Not Detected     van A/B (VRE gene) Not Detected     VIM  (Carbapenem resistant) Not Detected    Narrative:      Aerobic and anaerobic    Blood culture x two cultures. Draw prior to antibiotics. [[id number]] Collected: 04/05/23 1945    Order Status: Completed Specimen: Blood from Peripheral, Wrist, Right Updated: 04/06/23 1511     Blood Culture, Routine Gram stain susan bottle: Gram negative rods      Results called to and read back by: Ashley Nails RN 04/06/2023 14:48    Narrative:      Aerobic and anaerobic    Urine culture [[id number]] Collected: 04/06/23 0940    Order Status: No result Specimen: Urine Updated: 04/06/23 1049    Culture, Anaerobe [[id number]] Collected: 04/05/23 2245    Order Status: Sent Specimen: Wound from Leg, Right Updated: 04/06/23 0224

## 2023-04-06 NOTE — EICU
EICU BRIEF ADMIT NOTE:    Reason for ICU admission:  Septic shock    Please refer to admission H&P for details.     Comfortably sleeping in the bed.  Not in distress.    Chart reviewed: yes  Recent MD notes reviewed: Yes  Labs results reviewed: yes  Radiology: Chest images reviewed. Reports for others reviewed.  Telemetry tracing: yes  Evaluation via interactive audio and video telecommunications: yes comfortably sleeping in the bed.  Not in distress.  Communicated issues/orders/plan with: bedside ICU RN    Best Practices Review:  Stress ulcer prophylaxis: not indicated  DVT prophylaxis: Pharmacological .   Blood glucose monitoring: Ordered        Ventilator review:  Intubated : No      Assessment & Plan:     Impression:  Shock likely septic versus hypovolemic  Severe sepsis  Hyponatremia  Bilateral BKA with right stump ulceration with probable infection  Anion gap metabolic acidosis  Mild lactic acidosis  Anemia  Acute kidney injury  History of CHF and COPD  History of diabetes mellitus      Plan:  Primary team has started antibiotics with ceftriaxone and vancomycin.  Follow cultures.  Deescalate antibiotics based on further clinical data and culture results.  Continue IV fluid resuscitation.  Continue to trend lactic acid until it resolves.  Wean pressors if tolerated.  Strict intake output record and daily weight.  Continue to monitor renal parameters and electrolytes.  Continue rest of supportive care.      Thank you for allowing the EICU to participate in your patient's care. Please feel free to call us as needed.     I have encourage bedside RN to call me with the results of labs/imaging if ordered.         Andrade Noble MD  [place] [facility]  [phone]

## 2023-04-06 NOTE — PHARMACY MED REC
"Admission Medication History     The home medication history was taken by Zeferino Hamilton.    You may go to "Admission" then "Reconcile Home Medications" tabs to review and/or act upon these items.     The home medication list has been updated by the Pharmacy department.   Please read ALL comments highlighted in yellow.   Please address this information as you see fit.    Feel free to contact us if you have any questions or require assistance.      Medications listed below were obtained from: Analytic software- [employer] and Medical records  (Not in a hospital admission)      Zefernio Hamilton  [id number]      Current Outpatient Medications on File Prior to Encounter   Medication Sig Dispense Refill Last Dose    acetaminophen (TYLENOL) 500 MG tablet Take 2 tablets (1,000 mg total) by mouth 3 (three) times daily.  0     amLODIPine (NORVASC) 10 MG tablet Take 10 mg by mouth once daily.       ascorbic acid, vitamin C, (VITAMIN C) 500 MG tablet Take 1 tablet by mouth every morning.       aspirin (ECOTRIN) 81 MG EC tablet Take 1 tablet (81 mg total) by mouth once daily. 90 tablet 1     atorvastatin (LIPITOR) 80 MG tablet Take 80 mg by mouth once daily.       carvediloL (COREG) 3.125 MG tablet Take 3.125 mg by mouth 2 (two) times a day.       cholecalciferol, vitamin D3, 125 mcg (5,000 unit) Tab Take 5,000 Units by mouth every Monday.       gabapentin (NEURONTIN) 300 MG capsule Take 300 mg by mouth 3 (three) times daily.       HUMULIN R REGULAR U-100 INSULN 100 unit/mL injection Inject  into the skin 2 (two) times daily before meals. If blood sugar 200-250= 4 units, 251-300= 6 units, 301-350= 8 units, 351-400= 10 units, 401- 450= 12 units, >450= 14 units and call MD.       insulin detemir U-100 (LEVEMIR FLEXTOUCH) 100 unit/mL (3 mL) SubQ InPn pen Inject 12 Units into the skin 2 (two) times daily. 7.2 mL 11     levothyroxine (SYNTHROID) 125 MCG tablet Take 125 mcg by mouth once daily.       meloxicam (MOBIC) 7.5 MG tablet " Take 7.5 mg by mouth once daily.       multivitamin Tab Take 1 tablet by mouth once daily.       mycophenolate (CELLCEPT) 250 mg Cap Take 250 mg by mouth 2 (two) times daily.       nitroGLYCERIN (NITROSTAT) 0.4 MG SL tablet Place 1 tablet (0.4 mg total) under the tongue every 5 (five) minutes as needed. 30 tablet 0     ondansetron (ZOFRAN) 4 MG tablet Take 4 mg by mouth every 8 (eight) hours as needed for Nausea.       semaglutide (OZEMPIC) 1 mg/dose (2 mg/1.5 mL) PnIj Inject 1 mg under the skin every 7 days. 6 Syringe 3     sertraline (ZOLOFT) 25 MG tablet Take 25 mg by mouth once daily.       tacrolimus (PROGRAF) 0.5 MG Cap Take 2 capsules (1 mg total) by mouth every 12 (twelve) hours. 180 capsule 11     traMADoL (ULTRAM) 50 mg tablet Take 50 mg by mouth every 6 (six) hours as needed.                        .

## 2023-04-06 NOTE — PROGRESS NOTES
Pharmacokinetic Initial Assessment: IV Vancomycin    Assessment/Plan:    Initiate intravenous vancomycin with loading dose of 1750 mg once with subsequent doses when random concentrations are less than 20 mcg/mL  Desired empiric serum trough concentration is 15 to 20 mcg/mL  Draw vancomycin random level on 4/6 at 1400.  Pharmacy will continue to follow and monitor vancomycin.      Please contact pharmacy at extension [phone] with any questions regarding this assessment.     Thank you for the consult,   Indira Shellmariola       Patient brief summary:  Lavelle Ladd is a 67 y.o. male initiated on antimicrobial therapy with IV Vancomycin for treatment of suspected bone/joint infection    Drug Allergies:   Review of patient's allergies indicates:  No Known Allergies    Actual Body Weight:   84.6kg    Renal Function:   Estimated Creatinine Clearance: 33.8 mL/min (A) (based on SCr of 2.2 mg/dL (H)).,     Dialysis Method (if applicable):  N/A    CBC (last 72 hours):  Recent Labs   Lab Result Units 04/05/23  1758   WBC K/uL 10.57   Hemoglobin g/dL 10.0*   Hematocrit % 32.1*   Platelets K/uL 271   Gran % % 88.9*   Lymph % % 7.9*   Mono % % 2.2*   Eosinophil % % 0.2   Basophil % % 0.3   Differential Method  Automated       Metabolic Panel (last 72 hours):  Recent Labs   Lab Result Units 04/05/23  1758   Sodium mmol/L 131*   Potassium mmol/L 4.5   Chloride mmol/L 102   CO2 mmol/L 16*   Glucose mg/dL 92   BUN mg/dL 34*   Creatinine mg/dL 2.2*   Albumin g/dL 2.6*   Total Bilirubin mg/dL 0.4   Alkaline Phosphatase U/L 164*   AST U/L 46*   ALT U/L 31       Drug levels (last 3 results):  No results for input(s): VANCOMYCINRA, VANCORANDOM, VANCOMYCINPE, VANCOPEAK, VANCOMYCINTR, VANCOTROUGH in the last 72 hours.    Microbiologic Results:  Microbiology Results (last 7 days)       Procedure Component Value Units Date/Time    Blood culture x two cultures. Draw prior to antibiotics. [[id number]] Collected: 04/05/23 1945    Order  Status: Sent Specimen: Blood from Peripheral, Wrist, Right Updated: 04/05/23 1945    Blood culture x two cultures. Draw prior to antibiotics. [[id number]] Collected: 04/05/23 1757    Order Status: Sent Specimen: Blood from Peripheral, Antecubital, Right Updated: 04/05/23 1758

## 2023-04-06 NOTE — SUBJECTIVE & OBJECTIVE
ROS complete and negative unless stated in the interval HPI    Objective:     Vital Signs (Most Recent):  Temp: (!) 100.4 °F (38 °C) (04/06/23 0715)  Pulse: 87 (04/06/23 0809)  Resp: (!) 24 (04/06/23 0901)  BP: (!) 103/50 (04/06/23 0715)  SpO2: 98 % (04/06/23 0809)   Vital Signs (24h Range):  Temp:  [98.9 °F (37.2 °C)-102.5 °F (39.2 °C)] 100.4 °F (38 °C)  Pulse:  [] 87  Resp:  [15-46] 24  SpO2:  [90 %-100 %] 98 %  BP: ()/(40-70) 103/50     Weight: 84.6 kg (186 lb 6.4 oz)  Body mass index is 26 kg/m².      Intake/Output Summary (Last 24 hours) at 4/6/2023 0959  Last data filed at 4/6/2023 0700  Gross per 24 hour   Intake 4104 ml   Output 100 ml   Net 4004 ml       Physical Exam  Vitals reviewed.   Constitutional:       General: He is awake. He is not in acute distress.     Appearance: He is ill-appearing.      Comments: Chronically ill appearing male lying in bed on NC in NAD   Cardiovascular:      Rate and Rhythm: Normal rate.   Pulmonary:      Effort: Pulmonary effort is normal.      Breath sounds: Normal breath sounds.      Comments: On NC  Abdominal:      General: There is no distension.      Palpations: Abdomen is soft.   Musculoskeletal:      Right lower leg: No edema.      Left lower leg: No edema.      Comments: BLE amputations    Skin:     General: Skin is warm and dry.   Neurological:      General: No focal deficit present.      Mental Status: He is alert.   Psychiatric:         Behavior: Behavior is agitated. Behavior is cooperative.         Vents:       Lines/Drains/Airways       Central Venous Catheter Line  Duration             Percutaneous Central Line Insertion/Assessment - Triple Lumen  04/05/23 2140 Internal Jugular Right <1 day              Drain  Duration                  Hemodialysis AV Fistula 09/14/22 0717 Left upper arm 204 days    Female External Urinary Catheter 04/06/23 0700 <1 day              Peripheral Intravenous Line  Duration                  Peripheral IV - Single Lumen  04/05/23 1750 20 G Right Antecubital <1 day                    Significant Labs:    CBC/Anemia Profile:  Recent Labs   Lab 04/05/23 1758 04/06/23  0412   WBC 10.57 18.84*   HGB 10.0* 8.5*   HCT 32.1* 27.8*    250   MCV 83 82   RDW 15.1* 14.9*        Chemistries:  Recent Labs   Lab 04/05/23 1758 04/06/23 0412   * 129*   K 4.5 5.0    100   CO2 16* 19*   BUN 34* 35*   CREATININE 2.2* 2.4*   CALCIUM 9.0 8.5*   ALBUMIN 2.6*  --    PROT 7.5  --    BILITOT 0.4  --    ALKPHOS 164*  --    ALT 31  --    AST 46*  --    MG  --  1.2*   PHOS  --  3.4       All pertinent labs within the past 24 hours have been reviewed.    Significant Imaging:  I have reviewed all pertinent imaging results/findings within the past 24 hours.

## 2023-04-06 NOTE — ASSESSMENT & PLAN NOTE
- history of with possible recurrence to right stump/tib fib  - blood cultures and wound drainage culture pending   - continue with empiric vanco and rocephin  - would care and ID consulted

## 2023-04-06 NOTE — PLAN OF CARE
PT remains NSR-ST on monitor, 2L NC  MaxT: 102.5, prn medication given.  Levo titrated per order. See mar.  PT currently resting quietly.  Call bell in reach and instructed to call for any assistance.

## 2023-04-06 NOTE — H&P
[facility] - [facility] ([facility])  Critical Care Medicine  History & Physical    Patient Name: Lavelle Ladd  MRN: [id number]  Admission Date: 2023  Hospital Length of Stay: 0 days  Code Status: Full Code  Attending Physician: Jose Arcos MD   Primary Care Provider: Primary Doctor No   Principal Problem: Septic shock    Subjective:     HPI:  67 year old male with multiple medical issues including s/p renal transplant on cellcept and prograf; HTN; DM2; sergey lower extremity amputee    Presents to ED on evening of  with intractable vomiting. Was at [facility] with plan for d/c today but noted vomiting, fever, cough, body aches, diarrhea  ED evaluation revealed foul smelling drainage from rt stump wound  X ray concerning with osseous and soft tissue changes suspicious for osteo  Labs - wbc 10.5; lactate 2.7; procal 7; creatinine 2.2 (baseline 1.2)  blood cultures obtained  Treated with 2.5L IVF (plus 500ml from EMS), tylenol, vanco, rocephin  Refractory hypotension requiring pressor support    Critical care team contacted for admission for septic shock      Hospital/ICU Course:  No notes on file     Past Medical History:   Diagnosis Date    DINORAH (acute kidney injury) 2016    Arthritis     CAD in native artery 2019    CHF (congestive heart failure)     Chronic obstructive pulmonary disease 2016    Coronary artery disease involving native coronary artery of native heart without angina pectoris 2016    CRI (chronic renal insufficiency) 2019     donor kidney transplant for DM 16     Induction with Thymo x3 and IV solumedrol to total 875mg  Kidney Biopsy  2016: 16 glomeruli, ACR type 1 AVR type 2, significant microcirculatory changes, c4d negative, No DSA, 5 to10% fibrosis. Treated with thymo x8 2016- no rejection      Diastolic heart failure     Encounter for blood transfusion     ESRD on RRT since 10/2013 10/29/2013    Biopsy proven diabetic nephropathy and  lymphoplasmacytic interstitial infiltrate not c/w with AIN (ddx sjogrens or assoc with tamm-horsefall protein extravasation)     GERD (gastroesophageal reflux disease)     History of hepatitis C, s/p successful Rx w/ SVR12 - 4/2017 4/5/2017    Completed 12 weeks harvoni w/ SVR    Hyperlipidemia     Hypertension     PAD (peripheral artery disease) 7/21/2019    PIC line (peripherally inserted central catheter) flush     Prophylactic immunotherapy     Proteinuria     PVD (peripheral vascular disease) 6/26/2017    RLE BKA CT 12/11/16 Extensive atherosclerotic disease of the aorta and mesenteric arteries.     Renal hypertension     Type 2 diabetes mellitus with diabetic neuropathy, with long-term current use of insulin 12/1/2016    Vitamin B12 deficiency        Past Surgical History:   Procedure Laterality Date    ANGIOGRAPHY OF LOWER EXTREMITY N/A 9/23/2022    Procedure: ANGIOGRAM, LOWER EXTREMITY/left leg;  Surgeon: Donal Mcdonald MD;  Location: [facility] CATH LAB;  Service: Cardiovascular;  Laterality: N/A;    ANGIOGRAPHY OF LOWER EXTREMITY N/A 9/29/2022    Procedure: ANGIOGRAM, LOWER EXTREMITY/left leg;  Surgeon: Donal Mcodnald MD;  Location: [facility] CATH LAB;  Service: Peripheral Vascular;  Laterality: N/A;  resched from 9/23 pt ready now    AORTOGRAPHY WITH SERIALOGRAPHY N/A 6/14/2018    Procedure: LEFT LEG ANGIOGRAM;  Surgeon: Donal Mcdonald MD;  Location: [facility] CATH LAB;  Service: Vascular;  Laterality: N/A;    APPLICATION OF LARGE EXTERNAL FIXATION DEVICE TO TIBIA Left 8/4/2022    Procedure: APPLICATION, EXTERNAL FIXATION DEVICE, LARGE, TIBIA;  Surgeon: Good Villa MD;  Location: [facility] OR;  Service: Orthopedics;  Laterality: Left;    av bovine graft      Left UE    AV FISTULA PLACEMENT      left UE    BELOW KNEE AMPUTATION OF LOWER EXTREMITY Left 10/6/2022    Procedure: AMPUTATION, BELOW KNEE;  Surgeon: Donal Mcdonald MD;  Location: [facility] OR;  Service: Vascular;  Laterality: Left;    CARDIAC CATHETERIZATION  02/2015    CLOSED  REDUCTION OF INJURY OF TIBIA Left 8/4/2022    Procedure: CLOSED REDUCTION, TIBIA;  Surgeon: Good Villa MD;  Location: [facility] OR;  Service: Orthopedics;  Laterality: Left;  Closed reduction left tibial and fibular shaft fractures    CLOSURE OF WOUND Left 9/24/2018    Procedure: CLOSURE, WOUND;  Surgeon: Karla Wheeler DPM;  Location: [facility] OR;  Service: Podiatry;  Laterality: Left;  Secondary Wound closure, extensive    CLOSURE OF WOUND Left 11/5/2018    Procedure: CLOSURE, WOUND;  Surgeon: Karla Wheeler DPM;  Location: [facility] OR;  Service: Podiatry;  Laterality: Left;    DEBRIDEMENT OF MULTIPLE METATARSAL BONES Left 11/5/2018    Procedure: DEBRIDEMENT, METATARSAL BONE, 2 OR MORE BONES;  Surgeon: Karla Wheeler DPM;  Location: [facility] OR;  Service: Podiatry;  Laterality: Left;    EXCISION OF SKIN Left 9/27/2019    Procedure: EXCISION, SKIN;  Surgeon: Lenard Alarcon MD;  Location: [address];  Service: Plastics;  Laterality: Left;  Plastics set, NIMS monitor, ACell    FOOT AMPUTATION THROUGH METATARSAL Left 9/21/2018    Procedure: AMPUTATION, FOOT, TRANSMETATARSAL;  Surgeon: Karla Wheeler DPM;  Location: [facility] OR;  Service: Podiatry;  Laterality: Left;    FOOT AMPUTATION THROUGH METATARSAL Left 10/31/2018    Procedure: AMPUTATION, FOOT, TRANSMETATARSAL;  Surgeon: Karla Wheeler DPM;  Location: [facility] OR;  Service: Podiatry;  Laterality: Left;    FOOT AMPUTATION THROUGH METATARSAL Left 11/5/2018    Procedure: AMPUTATION, FOOT, TRANSMETATARSAL;  Surgeon: Karla Wheeler DPM;  Location: [facility] OR;  Service: Podiatry;  Laterality: Left;  revisional transmetatarsal amputation, Left foot    IRRIGATION AND DEBRIDEMENT OF LOWER EXTREMITY Left 10/31/2018    Procedure: IRRIGATION AND DEBRIDEMENT, LOWER EXTREMITY;  Surgeon: Karla Wheeler DPM;  Location: [facility] OR;  Service: Podiatry;  Laterality: Left;    KIDNEY TRANSPLANT  05/21/2016    LEFT HEART CATHETERIZATION Left 7/21/2019    Procedure:  CATHETERIZATION, HEART, LEFT;  Surgeon: Andrew Valdez MD;  Location: [facility];  Service: Cardiology;  Laterality: Left;    LEG AMPUTATION THROUGH KNEE  2011    right LE, started as nail puncture leading to diabetic ulcer    REMOVAL OF EXTERNAL FIXATION DEVICE Left 9/17/2022    Procedure: REMOVAL, EXTERNAL FIXATION DEVICE;  Surgeon: Kathleen Zazueta MD;  Location: [facility] OR;  Service: Orthopedics;  Laterality: Left;    SKIN FULL THICKNESS GRAFT Left 10/7/2019    Procedure: APPLICATION, GRAFT, SKIN, FULL-THICKNESS;  Surgeon: Lenard Alarcon MD;  [facility];  Service: Plastics;  Laterality: Left;    SURGICAL REMOVAL OF LESION OF FACE Right 10/7/2019    Procedure: EXCISION, LESION, FACE;  Surgeon: Lenard Alarcon MD;  Location: [facility] OR [facility] FLR;  Service: Plastics;  Laterality: Right;       Review of patient's allergies indicates:  No Known Allergies    Family History       Problem Relation (Age of Onset)    Cancer Father    Diabetes Father    Heart failure Father, Mother    Stroke Father          Tobacco Use    Smoking status: Former     Packs/day: 1.00     Years: 40.00     Pack years: 40.00     Types: Cigarettes     Quit date: 1/11/2013     Years since quitting: 10.2    Smokeless tobacco: Never   Substance and Sexual Activity    Alcohol use: Yes     Alcohol/week: 6.0 standard drinks     Types: 6 Cans of beer per week     Comment: seldom    Drug use: No    Sexual activity: Never         Review of Systems   Constitutional:  Positive for fatigue and fever.   HENT:  Negative for trouble swallowing.    Respiratory:  Negative for shortness of breath.    Cardiovascular:  Negative for chest pain.   Gastrointestinal:  Positive for diarrhea and vomiting.   Musculoskeletal:  Positive for back pain and myalgias.   Neurological:  Negative for syncope and headaches.   Psychiatric/Behavioral:  The patient is nervous/anxious.    Objective:     Vital Signs (Most Recent):  Temp: 99.5 °F (37.5 °C) (04/05/23  2040)  Pulse: 104 (04/05/23 2104)  Resp: (!) 22 (04/05/23 2104)  BP: (!) 78/56 (04/05/23 2104)  SpO2: 98 % (04/05/23 2104)   Vital Signs (24h Range):  Temp:  [99.5 °F (37.5 °C)-101.3 °F (38.5 °C)] 99.5 °F (37.5 °C)  Pulse:  [101-120] 104  Resp:  [20-25] 22  SpO2:  [96 %-100 %] 98 %  BP: ()/(55-70) 78/56     Weight: 84.6 kg (186 lb 6.4 oz)  Body mass index is 26 kg/m².    No intake or output data in the 24 hours ending 04/05/23 2128     NORepinephrine bitartrate-D5W         Physical Exam  Vitals and nursing note reviewed.   Constitutional:       General: He is awake.      Appearance: He is normal weight. He is ill-appearing.   HENT:      Head: Atraumatic.   Eyes:      Conjunctiva/sclera: Conjunctivae normal.   Neck:      Vascular: No JVD.   Cardiovascular:      Rate and Rhythm: Regular rhythm. Tachycardia present.      Pulses:           Radial pulses are 2+ on the right side and 2+ on the left side.      Arteriovenous access: Left arteriovenous access is present.  Pulmonary:      Effort: Pulmonary effort is normal.      Breath sounds: Normal breath sounds. No wheezing, rhonchi or rales.   Abdominal:      General: Bowel sounds are normal. There is no distension.      Palpations: Abdomen is soft.      Tenderness: There is no abdominal tenderness.   Musculoskeletal:      Right Lower Extremity: Right leg is amputated below knee.      Left Lower Extremity: Left leg is amputated below knee.   Skin:     General: Skin is warm and dry.      Capillary Refill: Capillary refill takes less than 2 seconds.      Comments: Wounds, see pictures, to sacrum and rt BKA stump   Neurological:      Mental Status: He is alert.             Vents:       Lines/Drains/Airways       Drain  Duration                  Hemodialysis AV Fistula 09/14/22 0717 Left upper arm 203 days              Peripheral Intravenous Line  Duration                  Peripheral IV - Single Lumen 20 G Posterior;Right Hand -- days         Peripheral IV - Single  Lumen 23 1750 20 G Right Antecubital <1 day                    Significant Labs:    CBC/Anemia Profile:  Recent Labs   Lab 23  1758   WBC 10.57   HGB 10.0*   HCT 32.1*      MCV 83   RDW 15.1*        Chemistries:  Recent Labs   Lab 23  1758   *   K 4.5      CO2 16*   BUN 34*   CREATININE 2.2*   CALCIUM 9.0   ALBUMIN 2.6*   PROT 7.5   BILITOT 0.4   ALKPHOS 164*   ALT 31   AST 46*       Lactic Acid:   Recent Labs   Lab 23  1758   LACTATE 2.7*     Troponin:   Recent Labs   Lab 23  1758   TROPONINI 0.018     All pertinent labs within the past 24 hours have been reviewed.    Significant Imaging:   I have reviewed all pertinent imaging results/findings within the past 24 hours.    Assessment/Plan:     Renal/   donor kidney transplant for DM 16, DINORAH on CKD  On cellcept and prograf  Baseline creatinine 1.2; admit 2.2  Likely s/t septic shock; support for MAP > 65  Renal dose medications  Consult nephrology in am    ID  * Septic shock  Source - suspected osteomyelitis to rt stump  Blood cultures sent  Empiric vanco, rocephin  S/p >30ml/kg fluid resuscitation; continue gentle hydration  Pressor, titrate for MAP > 65  ICU hemodynamic monitoring    Osteomyelitis  Suspected to rt stump/tib fib  Blood cultures sent  Send wound drainage cultures  Empiric vanco, rocephin    Immunology/Multi System  Immunosuppression  Prograf, cellcept  Send levels  Hold overnight with septic shock  Consulting nephrology to help guide therapy    Endocrine  Type 2 diabetes mellitus with hyperglycemia, with long-term current use of insulin  SSI prn with monitoring for glucose control and prevention of insulin toxicity        Critical Care Daily Checklist:    A: Awake: RASS Goal/Actual Goal:    Actual:     B: Spontaneous Breathing Trial Performed?     C: SAT & SBT Coordinated?  n/a                      D: Delirium: CAM-ICU     E: Early Mobility Performed? Yes   F: Feeding Goal:    Status:      Current Diet Order   Procedures    Diet clear liquid      AS: Analgesia/Sedation prn   T: Thromboembolic Prophylaxis heparin   H: HOB > 300 Yes   U: Stress Ulcer Prophylaxis (if needed) pepcid   G: Glucose Control SSI   B: Bowel Function     I: Indwelling Catheter (Lines & Woody) Necessity reviewed   D: De-escalation of Antimicrobials/Pharmacotherapies reveiwed    Plan for the day/ETD As above    Code Status:  Family/Goals of Care: Full Code  Sister is CASEYKaylaKecia     Critical Care Time: 50 minutes  Critical secondary to septic shock   Critical care was time spent personally by me on the following activities: development of treatment plan with patient or surrogate and bedside caregivers, discussions with consultants, evaluation of patient's response to treatment, examination of patient, ordering and performing treatments and interventions, ordering and review of laboratory studies, ordering and review of radiographic studies, pulse oximetry, re-evaluation of patient's condition. This critical care time did not overlap with that of any other provider or involve time for any procedures.     FIDEL Miller-BC  Critical Care Medicine  [facility] - [facility] ([facility])

## 2023-04-06 NOTE — PLAN OF CARE
[facility] - Int[facility] (Hospital)  Initial Discharge Assessment       Primary Care Provider: Primary Doctor No    Admission Diagnosis: Right leg pain [M79.604]  Infection of amputation stump [T87.40]  Fever [R50.9]  Encounter for central line placement [Z45.2]  Septic shock [A41.9, R65.21]  Sepsis, due to unspecified organism, unspecified whether acute organ dysfunction present [A41.9]    Admission Date: 4/5/2023  Expected Discharge Date:     Discharge Barriers Identified: None    Payor: HUMANA MANAGED MEDICARE / Plan: HUMANA TOTAL CARE ADVANTAGE / Product Type: Medicare Advantage /     Extended Emergency Contact Information  Primary Emergency Contact: Kecia Sagastume   [place]  Mobile Phone: [phone]  Relation: Relative    Discharge Plan A: Skilled Nursing Facility  Discharge Plan B: Home Health      [id number] [place] - [address]  [address]  [place] [address]  Phone: [phone] Fax: [phone]    [facility] (New Address) - [address] Previously: [address]  [facility] [address]  Phone: [phone] Fax: [phone]    [facility] - [facility] [address] [facility]  [address] [facility] [address]  Phone: [phone] Fax: [phone]    [facility] - [place] - [address]  [address]  [place] [address]  Phone: [phone] Fax: [phone]      Initial Assessment (most recent)       Adult Discharge Assessment - 04/06/23 1129          Discharge Assessment    Assessment Type Discharge Planning Assessment     Confirmed/corrected address, phone number and insurance Yes     Confirmed Demographics Correct on Facesheet     Source of Information patient;family     Communicated LISETH with patient/caregiver Date not available/Unable to determine     Reason For  Admission Septic Shock     People in Home alone     Facility Arrived From: --     Do you expect to return to your current living situation? Yes     Do you have help at home or someone to help you manage your care at home? Yes     Who are your caregiver(s) and their phone number(s)? TiagoKecia baires (Sister)     Prior to hospitilization cognitive status: Alert/Oriented     Current cognitive status: Alert/Oriented     Walking or Climbing Stairs ambulation difficulty, requires equipment;stair climbing difficulty, requires equipment;transferring difficulty, requires equipment     Mobility Management wheel chair     Dressing/Bathing bathing difficulty, requires equipment     Dressing/Bathing Management shower chair     Home Accessibility wheelchair accessible     Home Layout Able to live on 1st floor     Equipment Currently Used at Home wheelchair;bedside commode;grab bar     Readmission within 30 days? No     Patient currently being followed by outpatient case management? No     Do you currently have service(s) that help you manage your care at home? No     Do you take prescription medications? Yes     Do you have prescription coverage? Yes     Coverage MCR     Do you have any problems affording any of your prescribed medications? No     Is the patient taking medications as prescribed? yes     Who is going to help you get home at discharge? Kecia Orourke     How do you get to doctors appointments? family or friend will provide     Are you on dialysis? No     Do you take coumadin? No     Discharge Plan A Skilled Nursing Facility     Discharge Plan B Home Health     DME Needed Upon Discharge  none     Discharge Plan discussed with: Sibling     Discharge Barriers Identified None        Physical Activity    On average, how many days per week do you engage in moderate to strenuous exercise (like a brisk walk)? 0 days     On average, how many minutes do you engage in exercise at this level? 0 min                    Anticipated DC dispo: SNF or home health   Prior Level of Function: dependent with ADLs   PCP:     Comments:  CM spoke with patient's sister to introduce role and discuss discharge planning. Patient was living alone prior to multiple hospitalizations over the last few months. Patient was discharged from [facility] and before family was able to pick pt up, facility contacted EMS and pt was transported to the ED. Sister will be help at home and can provide transport at time of discharge. CM discharge needs depends on hospital progress. CM will continue following to assist with other needs.                 No Decelerations

## 2023-04-06 NOTE — PROGRESS NOTES
[facility] ([facility])  Critical Care Medicine  Progress Note    Patient Name: Lavelle Ladd  MRN: [id number]  Admission Date: 4/5/2023  Hospital Length of Stay: 1 days  Code Status: Full Code  Attending Provider: Jose Arcos MD  Primary Care Provider: Primary Doctor No   Principal Problem: Septic shock    Subjective:     HPI:  67 year old male with multiple medical issues including s/p renal transplant on cellcept and prograf; HTN; DM2; sergey lower extremity amputee    Presents to ED on evening of 4/5 with intractable vomiting. Was at [facility] with plan for d/c today but noted vomiting, fever, cough, body aches, diarrhea  ED evaluation revealed foul smelling drainage from rt stump wound  X ray concerning with osseous and soft tissue changes suspicious for osteo  Labs - wbc 10.5; lactate 2.7; procal 7; creatinine 2.2 (baseline 1.2)  blood cultures obtained  Treated with 2.5L IVF (plus 500ml from EMS), tylenol, vanco, rocephin  Refractory hypotension requiring pressor support    Critical care team contacted for admission for septic shock      Hospital/ICU Course:  4/6: remains on low dose levo at 0.02, pt with c/o pain to R stump this AM, with diarrhea - will check a c diff, resp stable on RA, pending nephro and ID consults       ROS complete and negative unless stated in the interval HPI    Objective:     Vital Signs (Most Recent):  Temp: (!) 100.4 °F (38 °C) (04/06/23 0715)  Pulse: 87 (04/06/23 0809)  Resp: (!) 24 (04/06/23 0901)  BP: (!) 103/50 (04/06/23 0715)  SpO2: 98 % (04/06/23 0809)   Vital Signs (24h Range):  Temp:  [98.9 °F (37.2 °C)-102.5 °F (39.2 °C)] 100.4 °F (38 °C)  Pulse:  [] 87  Resp:  [15-46] 24  SpO2:  [90 %-100 %] 98 %  BP: ()/(40-70) 103/50     Weight: 84.6 kg (186 lb 6.4 oz)  Body mass index is 26 kg/m².      Intake/Output Summary (Last 24 hours) at 4/6/2023 0959  Last data filed at 4/6/2023 0700  Gross per 24 hour   Intake 4104 ml   Output 100 ml   Net 4004  ml       Physical Exam  Vitals reviewed.   Constitutional:       General: He is awake. He is not in acute distress.     Appearance: He is ill-appearing.      Comments: Chronically ill appearing male lying in bed on NC in NAD   Cardiovascular:      Rate and Rhythm: Normal rate.   Pulmonary:      Effort: Pulmonary effort is normal.      Breath sounds: Normal breath sounds.      Comments: On NC  Abdominal:      General: There is no distension.      Palpations: Abdomen is soft.   Musculoskeletal:      Right lower leg: No edema.      Left lower leg: No edema.      Comments: BLE amputations    Skin:     General: Skin is warm and dry.   Neurological:      General: No focal deficit present.      Mental Status: He is alert.   Psychiatric:         Behavior: Behavior is agitated. Behavior is cooperative.         Vents:       Lines/Drains/Airways       Central Venous Catheter Line  Duration             Percutaneous Central Line Insertion/Assessment - Triple Lumen  04/05/23 2140 Internal Jugular Right <1 day              Drain  Duration                  Hemodialysis AV Fistula 09/14/22 0717 Left upper arm 204 days    Female External Urinary Catheter 04/06/23 0700 <1 day              Peripheral Intravenous Line  Duration                  Peripheral IV - Single Lumen 04/05/23 1750 20 G Right Antecubital <1 day                    Significant Labs:    CBC/Anemia Profile:  Recent Labs   Lab 04/05/23  1758 04/06/23  0412   WBC 10.57 18.84*   HGB 10.0* 8.5*   HCT 32.1* 27.8*    250   MCV 83 82   RDW 15.1* 14.9*        Chemistries:  Recent Labs   Lab 04/05/23  1758 04/06/23  0412   * 129*   K 4.5 5.0    100   CO2 16* 19*   BUN 34* 35*   CREATININE 2.2* 2.4*   CALCIUM 9.0 8.5*   ALBUMIN 2.6*  --    PROT 7.5  --    BILITOT 0.4  --    ALKPHOS 164*  --    ALT 31  --    AST 46*  --    MG  --  1.2*   PHOS  --  3.4       All pertinent labs within the past 24 hours have been reviewed.    Significant Imaging:  I have reviewed  all pertinent imaging results/findings within the past 24 hours.      ABG  No results for input(s): PH, PO2, PCO2, HCO3, BE in the last 168 hours.  Assessment/Plan:     Renal/   donor kidney transplant for DM 16, DINORAH on CKD  - on cellcept and prograf at baseline, prograf level <2 and cellcept level pending   - baseline creatinine 1.2, at time of admit crt  2.2  - DINORAH likely s/t septic shock, support for MAP > 65  - renal dosing of medications and avoid nephrotoxic agents as able   - nephrology consulted   - replete lytes as needed    ID  * Septic shock  - ? osteomyelitis to R stump  - also with diarrhea, ? c diff, stool testing ordered   - follow culture data, continue empiric abx   - continue IVF, allow PO intake  - continue levo for MAP goal > 65    Osteomyelitis  - history of with possible recurrence to right stump/tib fib  - blood cultures and wound drainage culture pending   - continue with empiric vanco and rocephin  - would care and ID consulted     Immunology/Multi System  Immunosuppression  - see plan for renal transplant     Endocrine  Type 2 diabetes mellitus with hyperglycemia, with long-term current use of insulin  - SSI      Prophylaxis Measures:  GI ppx: Famotidine  VTE ppx: Heparin  Glucose control: Insulin subcutaneous    Code Status: Full Code    Critical Care Time: 40 minutes  Critical secondary to Patient has a condition that poses threat to life and bodily function: Acute Renal Failure and shock requiring levo       Critical care was time spent personally by me on the following activities: development of treatment plan with patient or surrogate and bedside caregivers, discussions with consultants, evaluation of patient's response to treatment, examination of patient, ordering and performing treatments and interventions, ordering and review of laboratory studies, ordering and review of radiographic studies, pulse oximetry, re-evaluation of patient's condition. This critical care  time did not overlap with that of any other provider or involve time for any procedures.     Micah Umanzor NP  Critical Care Medicine  [facility] - Intensive Care ([facility])

## 2023-04-06 NOTE — SUBJECTIVE & OBJECTIVE
Past Medical History:   Diagnosis Date    DINORAH (acute kidney injury) 2016    Arthritis     CAD in native artery 2019    CHF (congestive heart failure)     Chronic obstructive pulmonary disease 2016    Coronary artery disease involving native coronary artery of native heart without angina pectoris 2016    CRI (chronic renal insufficiency) 2019     donor kidney transplant for DM 16     Induction with Thymo x3 and IV solumedrol to total 875mg  Kidney Biopsy  2016: 16 glomeruli, ACR type 1 AVR type 2, significant microcirculatory changes, c4d negative, No DSA, 5 to10% fibrosis. Treated with thymo x8 2016- no rejection      Diastolic heart failure     Encounter for blood transfusion     ESRD on RRT since 10/2013 10/29/2013    Biopsy proven diabetic nephropathy and lymphoplasmacytic interstitial infiltrate not c/w with AIN (ddx sjogrens or assoc with tamm-horsefall protein extravasation)     GERD (gastroesophageal reflux disease)     History of hepatitis C, s/p successful Rx w/ SVR12 - 2017    Completed 12 weeks harvoni w/ SVR    Hyperlipidemia     Hypertension     PAD (peripheral artery disease) 2019    PIC line (peripherally inserted central catheter) flush     Prophylactic immunotherapy     Proteinuria     PVD (peripheral vascular disease) 2017    RLE BKA CT 16 Extensive atherosclerotic disease of the aorta and mesenteric arteries.     Renal hypertension     Type 2 diabetes mellitus with diabetic neuropathy, with long-term current use of insulin 2016    Vitamin B12 deficiency        Past Surgical History:   Procedure Laterality Date    ANGIOGRAPHY OF LOWER EXTREMITY N/A 2022    Procedure: ANGIOGRAM, LOWER EXTREMITY/left leg;  Surgeon: Donal Mcdonald MD;  Location: [facility] CATH LAB;  Service: Cardiovascular;  Laterality: N/A;    ANGIOGRAPHY OF LOWER EXTREMITY N/A 2022    Procedure: ANGIOGRAM, LOWER EXTREMITY/left leg;  Surgeon: Donal  MD Tamara;  Location: [facility] CATH LAB;  Service: Peripheral Vascular;  Laterality: N/A;  resched from 9/23 pt ready now    AORTOGRAPHY WITH SERIALOGRAPHY N/A 6/14/2018    Procedure: LEFT LEG ANGIOGRAM;  Surgeon: Donal Mcdonald MD;  Location: [facility] CATH LAB;  Service: Vascular;  Laterality: N/A;    APPLICATION OF LARGE EXTERNAL FIXATION DEVICE TO TIBIA Left 8/4/2022    Procedure: APPLICATION, EXTERNAL FIXATION DEVICE, LARGE, TIBIA;  Surgeon: Good Villa MD;  Location: [facility];  Service: Orthopedics;  Laterality: Left;    av bovine graft      Left UE    AV FISTULA PLACEMENT      left UE    BELOW KNEE AMPUTATION OF LOWER EXTREMITY Left 10/6/2022    Procedure: AMPUTATION, BELOW KNEE;  Surgeon: Donal Mcdonald MD;  Location: [facility];  Service: Vascular;  Laterality: Left;    CARDIAC CATHETERIZATION  02/2015    CLOSED REDUCTION OF INJURY OF TIBIA Left 8/4/2022    Procedure: CLOSED REDUCTION, TIBIA;  Surgeon: Good Villa MD;  Location: [facility];  Service: Orthopedics;  Laterality: Left;  Closed reduction left tibial and fibular shaft fractures    CLOSURE OF WOUND Left 9/24/2018    Procedure: CLOSURE, WOUND;  Surgeon: Karla Wheeler DPM;  Location: [facility] OR;  Service: Podiatry;  Laterality: Left;  Secondary Wound closure, extensive    CLOSURE OF WOUND Left 11/5/2018    Procedure: CLOSURE, WOUND;  Surgeon: Karla Wheeler DPM;  Location: [facility];  Service: Podiatry;  Laterality: Left;    DEBRIDEMENT OF MULTIPLE METATARSAL BONES Left 11/5/2018    Procedure: DEBRIDEMENT, METATARSAL BONE, 2 OR MORE BONES;  Surgeon: Karla Wheeler DPM;  Location: [facility];  Service: Podiatry;  Laterality: Left;    EXCISION OF SKIN Left 9/27/2019    Procedure: EXCISION, SKIN;  Surgeon: Lenard Alarcon MD;  Location: [address];  Service: Plastics;  Laterality: Left;  Plastics set, NIMS monitor, ACell    FOOT AMPUTATION THROUGH METATARSAL Left 9/21/2018    Procedure: AMPUTATION, FOOT, TRANSMETATARSAL;  Surgeon: Karla Wheeler  LASHONDA;  Location: [facility] OR;  Service: Podiatry;  Laterality: Left;    FOOT AMPUTATION THROUGH METATARSAL Left 10/31/2018    Procedure: AMPUTATION, FOOT, TRANSMETATARSAL;  Surgeon: Karla Wheeler DPM;  Location: [facility] OR;  Service: Podiatry;  Laterality: Left;    FOOT AMPUTATION THROUGH METATARSAL Left 11/5/2018    Procedure: AMPUTATION, FOOT, TRANSMETATARSAL;  Surgeon: Karla Wheeler DPM;  Location: [facility] OR;  Service: Podiatry;  Laterality: Left;  revisional transmetatarsal amputation, Left foot    IRRIGATION AND DEBRIDEMENT OF LOWER EXTREMITY Left 10/31/2018    Procedure: IRRIGATION AND DEBRIDEMENT, LOWER EXTREMITY;  Surgeon: Karla Wheeler DPM;  Location: [facility] OR;  Service: Podiatry;  Laterality: Left;    KIDNEY TRANSPLANT  05/21/2016    LEFT HEART CATHETERIZATION Left 7/21/2019    Procedure: CATHETERIZATION, HEART, LEFT;  Surgeon: Andrew Valdez MD;  Location: [facility] CATH LAB;  Service: Cardiology;  Laterality: Left;    LEG AMPUTATION THROUGH KNEE  2011    right LE, started as nail puncture leading to diabetic ulcer    REMOVAL OF EXTERNAL FIXATION DEVICE Left 9/17/2022    Procedure: REMOVAL, EXTERNAL FIXATION DEVICE;  Surgeon: Kathleen Zazueta MD;  Location: [facility] OR;  Service: Orthopedics;  Laterality: Left;    SKIN FULL THICKNESS GRAFT Left 10/7/2019    Procedure: APPLICATION, GRAFT, SKIN, FULL-THICKNESS;  Surgeon: Lenard Alarcon MD;  Location: [address];  Service: Plastics;  Laterality: Left;    SURGICAL REMOVAL OF LESION OF FACE Right 10/7/2019    Procedure: EXCISION, LESION, FACE;  Surgeon: Lenard Alarcon MD;  [address]  Service: [address]astics;  Laterality: Right;       Review of patient's allergies indicates:  No Known Allergies    Family History       Problem Relation (Age of Onset)    Cancer Father    Diabetes Father    Heart failure Father, Mother    Stroke Father          Tobacco Use    Smoking status: Former     Packs/day: 1.00     Years: 40.00     Pack years: 40.00      Types: Cigarettes     Quit date: 1/11/2013     Years since quitting: 10.2    Smokeless tobacco: Never   Substance and Sexual Activity    Alcohol use: Yes     Alcohol/week: 6.0 standard drinks     Types: 6 Cans of beer per week     Comment: seldom    Drug use: No    Sexual activity: Never         Review of Systems   Constitutional:  Positive for fatigue and fever.   HENT:  Negative for trouble swallowing.    Respiratory:  Negative for shortness of breath.    Cardiovascular:  Negative for chest pain.   Gastrointestinal:  Positive for diarrhea and vomiting.   Musculoskeletal:  Positive for back pain and myalgias.   Neurological:  Negative for syncope and headaches.   Psychiatric/Behavioral:  The patient is nervous/anxious.    Objective:     Vital Signs (Most Recent):  Temp: 99.5 °F (37.5 °C) (04/05/23 2040)  Pulse: 104 (04/05/23 2104)  Resp: (!) 22 (04/05/23 2104)  BP: (!) 78/56 (04/05/23 2104)  SpO2: 98 % (04/05/23 2104)   Vital Signs (24h Range):  Temp:  [99.5 °F (37.5 °C)-101.3 °F (38.5 °C)] 99.5 °F (37.5 °C)  Pulse:  [101-120] 104  Resp:  [20-25] 22  SpO2:  [96 %-100 %] 98 %  BP: ()/(55-70) 78/56     Weight: 84.6 kg (186 lb 6.4 oz)  Body mass index is 26 kg/m².    No intake or output data in the 24 hours ending 04/05/23 2128     NORepinephrine bitartrate-D5W         Physical Exam  Vitals and nursing note reviewed.   Constitutional:       General: He is awake.      Appearance: He is normal weight. He is ill-appearing.   HENT:      Head: Atraumatic.   Eyes:      Conjunctiva/sclera: Conjunctivae normal.   Neck:      Vascular: No JVD.   Cardiovascular:      Rate and Rhythm: Regular rhythm. Tachycardia present.      Pulses:           Radial pulses are 2+ on the right side and 2+ on the left side.      Arteriovenous access: Left arteriovenous access is present.  Pulmonary:      Effort: Pulmonary effort is normal.      Breath sounds: Normal breath sounds. No wheezing, rhonchi or rales.   Abdominal:      General:  Bowel sounds are normal. There is no distension.      Palpations: Abdomen is soft.      Tenderness: There is no abdominal tenderness.   Musculoskeletal:      Right Lower Extremity: Right leg is amputated below knee.      Left Lower Extremity: Left leg is amputated below knee.   Skin:     General: Skin is warm and dry.      Capillary Refill: Capillary refill takes less than 2 seconds.      Comments: Wounds, see pictures, to sacrum and rt BKA stump   Neurological:      Mental Status: He is alert.      GCS: GCS eye subscore is 4. GCS verbal subscore is 4. GCS motor subscore is 6.      Comments: Mild confusion to time/age; oriented to person, place, situation             Vents:       Lines/Drains/Airways       Drain  Duration                  Hemodialysis AV Fistula 09/14/22 0717 Left upper arm 203 days              Peripheral Intravenous Line  Duration                  Peripheral IV - Single Lumen 20 G Posterior;Right Hand -- days         Peripheral IV - Single Lumen 04/05/23 1750 20 G Right Antecubital <1 day                    Significant Labs:    CBC/Anemia Profile:  Recent Labs   Lab 04/05/23  1758   WBC 10.57   HGB 10.0*   HCT 32.1*      MCV 83   RDW 15.1*        Chemistries:  Recent Labs   Lab 04/05/23  1758   *   K 4.5      CO2 16*   BUN 34*   CREATININE 2.2*   CALCIUM 9.0   ALBUMIN 2.6*   PROT 7.5   BILITOT 0.4   ALKPHOS 164*   ALT 31   AST 46*       Lactic Acid:   Recent Labs   Lab 04/05/23  1758   LACTATE 2.7*     Troponin:   Recent Labs   Lab 04/05/23  1758   TROPONINI 0.018     All pertinent labs within the past 24 hours have been reviewed.    Significant Imaging:   I have reviewed all pertinent imaging results/findings within the past 24 hours.

## 2023-04-06 NOTE — HPI
67 year old male with multiple medical issues including s/p renal transplant on cellcept and prograf; HTN; DM2; sergey lower extremity amputee    Presents to ED on evening of 4/5 with intractable vomiting. Was at [facility] with plan for d/c today but noted vomiting, fever, cough, body aches, diarrhea  ED evaluation revealed foul smelling drainage from rt stump wound  X ray concerning with osseous and soft tissue changes suspicious for osteo  Labs - wbc 10.5; lactate 2.7; procal 7; creatinine 2.2 (baseline 1.2)  blood cultures obtained  Treated with 2.5L IVF (plus 500ml from EMS), tylenol, vanco, rocephin  Refractory hypotension requiring pressor support    Critical care team contacted for admission for septic shock

## 2023-04-06 NOTE — CONSULTS
Hyper granulation tissue above skin  with rolled edges noted to the sacrum. Area cleaned and  foam dressing placed to sacrum.    04/06/23 0915   WOCN Assessment   WOCN Total Time (mins) 45   Visit Date 04/06/23   Visit Time 0915   Consult Type New   WOCN Speciality Wound   Wound pressure        Altered Skin Integrity 04/05/23 2235 Sacral spine   Date First Assessed/Time First Assessed: 04/05/23 2235   Altered Skin Integrity Present on Admission - Did Patient arrive to the hospital with altered skin?: yes  Location: Sacral spine   Wound Image    Description of Altered Skin Integrity Full thickness tissue loss. Subcutaneous fat may be visible but bone, tendon or muscle are not exposed   Dressing Appearance Clean;Intact;Dry   Drainage Amount None   Appearance Red   Tissue loss description Full thickness   Red (%), Wound Tissue Color 100 %   Wound Edges Rolled/closed   Wound Length (cm) 2 cm   Wound Width (cm) 2 cm   Wound Depth (cm) 0 cm   Wound Volume (cm^3) 0 cm^3   Wound Surface Area (cm^2) 4 cm^2   Care Cleansed with:;Sterile normal saline   Dressing Foam        Altered Skin Integrity 04/06/23 0915 Right anterior Knee Shearing Full thickness tissue loss. Base is covered by slough and/or eschar in the wound bed   Date First Assessed/Time First Assessed: 04/06/23 0915   Altered Skin Integrity Present on Admission - Did Patient arrive to the hospital with altered skin?: yes  Side: Right  Orientation: anterior  Location: (c) Knee  Primary Wound Type: Shearing  De...   Wound Image    Description of Altered Skin Integrity Full thickness tissue loss. Base is covered by slough and/or eschar in the wound bed   Dressing Appearance Intact;Clean;Dry   Drainage Amount None   Appearance Red;Slough   Tissue loss description Full thickness   Red (%), Wound Tissue Color 50 %   Yellow (%), Wound Tissue Color 50 %   Periwound Area Redness   Wound Length (cm) 2 cm   Wound Width (cm) 2 cm   Wound Depth (cm) 2 cm   Wound Volume (cm^3) 8  cm^3   Wound Surface Area (cm^2) 4 cm^2   Undermining (depth (cm)/location) 2  (2/noon and 1 o clock)   Care Cleansed with:;Wound cleanser   Dressing Applied;Gauze;Foam   Packing packed with;other (see comment)  (packed with vashe moist gauze)

## 2023-04-06 NOTE — CONSULTS
..Lavelle Ladd is a 67 y.o. male for whom nephrology consult has been requested to evaluate and give opinion.     HPI: 67 year old white male s/p renal Txp in [place] years ago () induced with thymoglobulin and IV solumedrol; acute cellular rejection on  bx; with PAD; admitted from SNF with chief complaint of fever, cough, diarrhea which has actually been going on for months he states; currently having diarrhea now and is seen in ICU setting on Levo and saline administration; Nephrology consult requested for elevated Scr 2.2 to 2.4 range; it was 3-4 range during prior hospitalization back in 10/22; he denies emesis. Meds and labs reviewed.  Being ruled out for C. Diff at current time. Some hyponatremia noted as well.  I have been asked to assist with his care. His IS regimen is notable for Cellcept and Prograf.     PAST MEDICAL HISTORY:  He  has a past medical history of DINORAH (acute kidney injury) (2016), Arthritis, CAD in native artery (2019), CHF (congestive heart failure), Chronic obstructive pulmonary disease (2016), Coronary artery disease involving native coronary artery of native heart without angina pectoris (2016), CRI (chronic renal insufficiency) (2019),  donor kidney transplant for DM 16, Diastolic heart failure, Encounter for blood transfusion, ESRD on RRT since 10/2013 (10/29/2013), GERD (gastroesophageal reflux disease), History of hepatitis C, s/p successful Rx w/ SVR12 - 2017 (2017), Hyperlipidemia, Hypertension, PAD (peripheral artery disease) (2019), PIC line (peripherally inserted central catheter) flush, Prophylactic immunotherapy, Proteinuria, PVD (peripheral vascular disease) (2017), Renal hypertension, Type 2 diabetes mellitus with diabetic neuropathy, with long-term current use of insulin (2016), and Vitamin B12 deficiency.    PAST SURGICAL HISTORY:  He  has a past surgical history that includes Leg amputation through  knee (2011); AV fistula placement; av bovine graft; Cardiac catheterization (02/2015); Kidney transplant (05/21/2016); Aortography with serialography (N/A, 6/14/2018); Foot amputation through metatarsal (Left, 9/21/2018); Closure of wound (Left, 9/24/2018); Foot amputation through metatarsal (Left, 10/31/2018); Irrigation and debridement of lower extremity (Left, 10/31/2018); Closure of wound (Left, 11/5/2018); Debridement of multiple metatarsal bones (Left, 11/5/2018); Foot amputation through metatarsal (Left, 11/5/2018); Left heart catheterization (Left, 7/21/2019); Excision of skin (Left, 9/27/2019); Full thickness skin graft (Left, 10/7/2019); Surgical removal of lesion of face (Right, 10/7/2019); Application of large external fixation device to tibia (Left, 8/4/2022); Closed reduction of injury of tibia (Left, 8/4/2022); Removal of external fixation device (Left, 9/17/2022); Angiography of lower extremity (N/A, 9/23/2022); Angiography of lower extremity (N/A, 9/29/2022); and Below knee amputation of lower extremity (Left, 10/6/2022).    SOCIAL HISTORY:  He  reports that he quit smoking about 10 years ago. He has a 40.00 pack-year smoking history. He has never used smokeless tobacco. He reports current alcohol use of about 6.0 standard drinks per week. He reports that he does not use drugs.      FAMILY MEDICAL HISTORY:  His family history includes Cancer in his father; Diabetes in his father; Heart failure in his father and mother; Stroke in his father.    Review of patient's allergies indicates:  No Known Allergies     cefTRIAXone (ROCEPHIN) IVPB  1 g Intravenous Q24H    famotidine  20 mg Oral Daily    gabapentin  300 mg Oral TID    heparin (porcine)  5,000 Units Subcutaneous Q8H    mupirocin   Nasal BID    tacrolimus  0.5 mg Oral BID       Prior to Admission medications    Medication Sig Start Date End Date Taking? Authorizing Provider   acetaminophen (TYLENOL) 500 MG tablet Take 2 tablets (1,000 mg total) by  mouth 3 (three) times daily. 8/12/22   Krishna Zamora MD   amLODIPine (NORVASC) 10 MG tablet Take 10 mg by mouth once daily.    Historical Provider   ascorbic acid, vitamin C, (VITAMIN C) 500 MG tablet Take 1 tablet by mouth every morning.    Historical Provider   aspirin (ECOTRIN) 81 MG EC tablet Take 1 tablet (81 mg total) by mouth once daily. 9/2/22 9/2/23  Albino Dimas NP   atorvastatin (LIPITOR) 80 MG tablet Take 80 mg by mouth once daily.    Historical Provider   carvediloL (COREG) 3.125 MG tablet Take 3.125 mg by mouth 2 (two) times a day. 12/2/22   Historical Provider   cholecalciferol, vitamin D3, 125 mcg (5,000 unit) Tab Take 5,000 Units by mouth every Monday.    Historical Provider   gabapentin (NEURONTIN) 300 MG capsule Take 300 mg by mouth 3 (three) times daily. 2/3/20   Historical Provider   HUMULIN R REGULAR U-100 INSULN 100 unit/mL injection Inject  into the skin 2 (two) times daily before meals. If blood sugar 200-250= 4 units, 251-300= 6 units, 301-350= 8 units, 351-400= 10 units, 401- 450= 12 units, >450= 14 units and call MD. 4/5/23   Historical Provider   insulin detemir U-100 (LEVEMIR FLEXTOUCH) 100 unit/mL (3 mL) SubQ InPn pen Inject 12 Units into the skin 2 (two) times daily. 10/17/22 10/17/23  Indira Perry NP   levothyroxine (SYNTHROID) 125 MCG tablet Take 125 mcg by mouth once daily. 4/4/23   Historical Provider   meloxicam (MOBIC) 7.5 MG tablet Take 7.5 mg by mouth once daily. 4/4/23   Historical Provider   multivitamin Tab Take 1 tablet by mouth once daily. 10/18/22   Indira Perry NP   mycophenolate (CELLCEPT) 250 mg Cap Take 250 mg by mouth 2 (two) times daily.    Historical Provider   nitroGLYCERIN (NITROSTAT) 0.4 MG SL tablet Place 1 tablet (0.4 mg total) under the tongue every 5 (five) minutes as needed. 4/22/20 8/4/22  Michael Contreras MD   ondansetron (ZOFRAN) 4 MG tablet Take 4 mg by mouth every 8 (eight) hours as needed for Nausea.    Historical Provider    semaglutide (OZEMPIC) 1 mg/dose (2 mg/1.5 mL) PnIj Inject 1 mg under the skin every 7 days. 2/11/21   Bren Ruiz PA-C   sertraline (ZOLOFT) 25 MG tablet Take 25 mg by mouth once daily.    Historical Provider   tacrolimus (PROGRAF) 0.5 MG Cap Take 2 capsules (1 mg total) by mouth every 12 (twelve) hours. 6/30/20   Avel Estrada MD   traMADoL (ULTRAM) 50 mg tablet Take 50 mg by mouth every 6 (six) hours as needed. 3/16/23   Historical Provider   cloNIDine (CATAPRES) 0.1 MG tablet Take 1 tablet (0.1 mg total) by mouth 3 (three) times daily.  Patient not taking: Reported on 8/4/2022 7/6/20 8/12/22  Maria Eugenia Rand MD   furosemide (LASIX) 40 MG tablet Take 1 tablet (40 mg total) by mouth daily as needed (Leg swelling, Nocturnal SOB). 9/15/21 8/12/22  Michael Contreras MD   ipratropium (ATROVENT) 0.02 % nebulizer solution Take 2.5 mLs (500 mcg total) by nebulization 4 (four) times daily.  Patient not taking: Reported on 8/4/2022 1/7/20 8/12/22  Colin Garcia MD   isosorbide mononitrate (IMDUR) 30 MG 24 hr tablet Take 2 tablets (60 mg total) by mouth once daily.  Patient not taking: Reported on 8/4/2022 12/14/19 8/12/22  Charito Oleary MD   ketoconazole (NIZORAL) 2 % cream Apply topically 2 (two) times daily. 7/29/20 8/12/22  Wade Stevens MD   levalbuterol (XOPENEX) 1.25 mg/3 mL nebulizer solution Take 3 mLs (1.25 mg total) by nebulization every 6 to 8 hours as needed for Wheezing or Shortness of Breath. 1/7/20 8/12/22  Colin Garcia MD   metoprolol tartrate (LOPRESSOR) 25 MG tablet Take 1 tablet (25 mg total) by mouth 2 (two) times daily. 12/15/20 8/12/22  Michael Contreras MD        REVIEW OF SYSTEMS:    Diarrhea.     Patient has no fever, fatigue, visual changes, chest pain, edema, cough, dyspnea, nausea, vomiting, constipation, diarrhea, arthralgias, pruritis, dizziness, weakness, depression, confusion.        PHYSICAL EXAM:   weight is 84.6 kg (186 lb 6.4 oz). His oral temperature is 100.4 °F (38 °C)  (abnormal). His blood pressure is 90/53 (abnormal) and his pulse is 86. His respiration is 25 (abnormal) and oxygen saturation is 93% (abnormal).   Gen: WDWN male in no apparent distress  Psych: Normal mood and affect  Skin: No rashes or ulcers  Eyes: Normal conjunctiva and lids, PERRLA  ENT: Normal hearing with no oropharyngeal lesions  Neck: No JVD  Chest: Clear with no rales, rhonchi, wheezing with normal effort  CV: Regular with no murmurs, gallops or rubs  Abd: Soft, nontender, no distension, positive bowel sounds  Ext: No cyanosis, clubbing or edema; BL amputation.           IMPRESSION AND RECOMMENDATIONS:    DINORAH on CKD (baseline cannot be established)  Sepsis  Possible C. Diff +  Severe diarrhea  S/p renal Txp 2016  DM type 2  PAD  Hypotension  Acute febrile illness  Hypomagnesemia  Metabolic acidosis    Plan: Will increase prograf from 0.5 to 1 mg PO BID due to low levels and re check daily Tac troughs; hold Cellcept for now until all infections clear; unclear if associated with his diarrhea or the latter due to C. Diff; await toxin assay; d/w pharmacy; agree with Levo and IVF support; no HD needed or RRT;' iPTH and Phos and Vit D ordered for completion; LEIF Rx for anemia when criteria satisfied; meds and labs reviewed; will follow closely with you; no suspicion for active rejection at this point; goal Tac 4-6 given vintage of 7 years out from Txp status. Mag was replaced and will dose PO bicarb; LEIF Rx x 1 now;  My colleague assumes care in am.     Lauri Blackburn MD  [phone]      Lauri Blackburn MD

## 2023-04-06 NOTE — ASSESSMENT & PLAN NOTE
Suspected to rt stump/tib fib  Blood cultures sent  Send wound drainage cultures  Empiric vanco, rocephin

## 2023-04-06 NOTE — ASSESSMENT & PLAN NOTE
Source - suspected osteomyelitis to rt stump  Blood cultures sent  Empiric vanco, rocephin  S/p >30ml/kg fluid resuscitation; continue gentle hydration  Pressor, titrate for MAP > 65  ICU hemodynamic monitoring

## 2023-04-06 NOTE — ASSESSMENT & PLAN NOTE
- on cellcept and prograf at baseline, prograf level <2 and cellcept level pending   - baseline creatinine 1.2, at time of admit crt  2.2  - DINORAH likely s/t septic shock, support for MAP > 65  - renal dosing of medications and avoid nephrotoxic agents as able   - nephrology consulted   - replete lytes as needed

## 2023-04-07 PROBLEM — A49.8 CLOSTRIDIUM DIFFICILE INFECTION: Status: ACTIVE | Noted: 2023-01-01

## 2023-04-07 NOTE — ASSESSMENT & PLAN NOTE
- on cellcept and prograf at baseline, prograf level <2 and cellcept level pending   - baseline creatinine 1.2, at time of admit crt  2.2  - DINORAH likely s/t septic shock, support for MAP > 65  - renal dosing of medications and avoid nephrotoxic agents as able   - nephrology consulted   - prograf resumed, cellcept on hold s/t acute infection   - replete lytes as needed

## 2023-04-07 NOTE — PROGRESS NOTES
[facility] - Intensive Care ([facility])  Nephrology  Progress Note    Patient Name: Lavelle Ladd  MRN: [id number]  Admission Date: 4/5/2023  Hospital Length of Stay: 2 days  Attending Provider: Jose Arcos MD   Primary Care Physician: Primary Doctor No  Principal Problem:Septic shock    Consults  Subjective:     Interval History:  Patient was seen in the intensive care unit.  In bed resting comfortably.  No acute distress noted.    Review of systems:  No shortness of breath or chest discomfort.  No fevers or chills.  No nausea vomiting.  No swelling.    Review of patient's allergies indicates:  No Known Allergies  Current Facility-Administered Medications   Medication Frequency    0.9%  NaCl infusion PRN    0.9%  NaCl infusion Continuous    acetaminophen tablet 650 mg Q6H PRN    cefTRIAXone (ROCEPHIN) 2 g in dextrose 5 % in water (D5W) 5 % 50 mL IVPB (MB+) Q24H    dextrose 10% bolus 125 mL 125 mL PRN    dextrose 10% bolus 125 mL 125 mL PRN    dextrose 10% bolus 250 mL 250 mL PRN    dextrose 10% bolus 250 mL 250 mL PRN    famotidine tablet 20 mg Daily    gabapentin capsule 300 mg TID    glucagon (human recombinant) injection 1 mg PRN    heparin (porcine) injection 5,000 Units Q8H    HYDROcodone-acetaminophen 5-325 mg per tablet 1 tablet Q4H PRN    influenza 65up-adj (QUADRIVALENT ADJUVANTED PF) vaccine 0.5 mL vaccine x 1 dose    insulin aspart U-100 pen 1-10 Units QID (AC + HS) PRN    lactated ringers bolus 1,000 mL Once    mupirocin 2 % ointment BID    NORepinephrine 4 mg in dextrose 5% 250 mL infusion (premix) (titrating) Continuous    ondansetron injection 4 mg Q6H PRN    sodium bicarbonate tablet 1,300 mg BID    tacrolimus capsule 1 mg BID    vancomycin - pharmacy to dose pharmacy to manage frequency    vancomycin SolR 125 mg Q6H       Objective:     Vital Signs (Most Recent):  Temp: 99.1 °F (37.3 °C) (04/07/23 0715)  Pulse: 90 (04/07/23 0915)  Resp: (!) 23 (04/07/23 1045)  BP: (!) 86/39 (04/07/23  0915)  SpO2: (!) 94 % (04/07/23 0915)   Vital Signs (24h Range):  Temp:  [98 °F (36.7 °C)-99.1 °F (37.3 °C)] 99.1 °F (37.3 °C)  Pulse:  [] 90  Resp:  [14-37] 23  SpO2:  [85 %-100 %] 94 %  BP: ()/(39-74) 86/39     Weight: 84.6 kg (186 lb 6.4 oz) (04/05/23 1931)  Body mass index is 26 kg/m².  Body surface area is 2.06 meters squared.    I/O last 3 completed shifts:  In: 6144.7 [I.V.:4335.1; NG/GT:30; IV Piggyback:1779.6]  Out: 2830 [Urine:2180; Stool:650]    Physical Exam  Constitutional:       Appearance: Normal appearance.   HENT:      Head: Normocephalic and atraumatic.   Eyes:      General: No scleral icterus.     Extraocular Movements: Extraocular movements intact.      Pupils: Pupils are equal, round, and reactive to light.   Cardiovascular:      Rate and Rhythm: Normal rate and regular rhythm.   Pulmonary:      Effort: Pulmonary effort is normal.      Breath sounds: Normal breath sounds.   Musculoskeletal:      Right lower leg: No edema.      Left lower leg: No edema.   Skin:     General: Skin is warm and dry.   Neurological:      General: No focal deficit present.      Mental Status: He is alert and oriented to person, place, and time.   Psychiatric:         Mood and Affect: Mood normal.         Behavior: Behavior normal.       Significant Labs:sureBMP:   Recent Labs   Lab 04/07/23  0435   GLU 86   *   K 3.9      CO2 18*   BUN 36*   CREATININE 2.3*   CALCIUM 8.2*   MG 2.2     CMP:   Recent Labs   Lab 04/05/23  1758 04/06/23  0412 04/07/23  0435   GLU 92   < > 86   CALCIUM 9.0   < > 8.2*   ALBUMIN 2.6*  --   --    PROT 7.5  --   --    *   < > 130*   K 4.5   < > 3.9   CO2 16*   < > 18*      < > 101   BUN 34*   < > 36*   CREATININE 2.2*   < > 2.3*   ALKPHOS 164*  --   --    ALT 31  --   --    AST 46*  --   --    BILITOT 0.4  --   --     < > = values in this interval not displayed.     All labs within the past 24 hours have been reviewed.    Significant Imaging:  Labs:  Reviewed      Assessment/Plan:     Active Diagnoses:    Diagnosis Date Noted POA    PRINCIPAL PROBLEM:  Septic shock [A41.9, R65.21] 2018 Yes    Clostridium difficile infection [A49.8] 2023 Yes    Osteomyelitis [M86.9] 11/10/2018 Yes    Immunosuppression [D84.9]  Yes    Type 2 diabetes mellitus with hyperglycemia, with long-term current use of insulin [E11.65, Z79.4]  Not Applicable     donor kidney transplant for DM 16, DINORAH on CKD [Z94.0]  Not Applicable     Chronic      Problems Resolved During this Admission:       Assessment and plan:    1. Kidney transplant status: Status post kidney transplant in 2016.  Baseline creatinine appears run between about 1.2 and 1.4.    2. DINORAH:  Creatinine has been running between about 2.2 and 2.4 since admission.  There may be a component of ATN on top of prerenal azotemia.  He is nonoliguric with 2.8 L urine output yesterday.    3. Immunosuppression:  Prograf dose was recently adjusted.  He is currently on 1 mg twice a day.  Prograf level is pending.    4. Electrolytes: Potassium is stable at 3.9.  Bicarbonate is slightly low at 18.    Approximately 50 minutes was spent in face-to-face conversation, chart review and coordination of care with other providers.    Thank you for your consult.     Jose David Hooker MD  Nephrology  [facility] - Intensive Care ([facility])

## 2023-04-07 NOTE — PLAN OF CARE
Weaned levophed off; gave 1 L LR bolus.  Changed flexiseal d/t leakage; remains to have minor leakage.  Changed diet to ADA & changed BG check to AC/HS.  Pain medication changed to q 4 hrs.

## 2023-04-07 NOTE — CONSULTS
Pharmacokinetic Assessment Follow Up: IV Vancomycin    Vancomycin serum concentration assessment(s):    The random level was drawn correctly and can be used to guide therapy at this time. The measurement is within the desired definitive target range of 15 to 20 mcg/mL.    Vancomycin Regimen Plan:    Re-dose when the random level is less than 20 mcg/mL, next level to be drawn at 0700 on 4/8    Drug levels (last 3 results):  Recent Labs   Lab Result Units 04/06/23  1442 04/07/23  1044   Vancomycin, Random ug/mL 7.0 14.9       Pharmacy will continue to follow and monitor vancomycin.    Please contact pharmacy at extension [phone] for questions regarding this assessment.    Thank you for the consult,   Samantha Powell, PharmD 4/7/2023 1:54 PM         Patient brief summary:  Lavelle Ladd is a 67 y.o. male initiated on antimicrobial therapy with IV Vancomycin for treatment of bone/joint infection    The patient's currently being pulse dosed.    Drug Allergies:   Review of patient's allergies indicates:  No Known Allergies    Actual Body Weight:   84.6 kg    Renal Function:   Estimated Creatinine Clearance: 32.3 mL/min (A) (based on SCr of 2.3 mg/dL (H)).,     Dialysis Method (if applicable):  N/A    CBC (last 72 hours):  Recent Labs   Lab Result Units 04/05/23 1758 04/05/23  2303 04/06/23 0412 04/07/23  0435   WBC K/uL 10.57  --  18.84* 10.23   Hemoglobin g/dL 10.0*  --  8.5* 8.4*   Hemoglobin A1C %  --  6.7*  --   --    Hematocrit % 32.1*  --  27.8* 27.1*   Platelets K/uL 271  --  250 233   Gran % % 88.9*  --  69.0 70.7   Lymph % % 7.9*  --  3.0* 16.4*   Mono % % 2.2*  --  1.0* 11.0   Eosinophil % % 0.2  --  0.0 1.0   Basophil % % 0.3  --  0.0 0.2   Differential Method  Automated  --  Manual Automated       Metabolic Panel (last 72 hours):  Recent Labs   Lab Result Units 04/05/23  1758 04/06/23  0412 04/06/23  0940 04/06/23  1123 04/07/23  0435   Sodium mmol/L 131* 129*  --   --  130*   Potassium mmol/L 4.5 5.0  --    --  3.9   Chloride mmol/L 102 100  --   --  101   CO2 mmol/L 16* 19*  --   --  18*   Glucose mg/dL 92 103  --   --  86   Glucose, UA   --   --  Negative  --   --    BUN mg/dL 34* 35*  --   --  36*   Creatinine mg/dL 2.2* 2.4*  --   --  2.3*   Creatinine, Urine mg/dL  --   --  62.6  --   --    Albumin g/dL 2.6*  --   --   --   --    Total Bilirubin mg/dL 0.4  --   --   --   --    Alkaline Phosphatase U/L 164*  --   --   --   --    AST U/L 46*  --   --   --   --    ALT U/L 31  --   --   --   --    Magnesium mg/dL  --  1.2*  --   --  2.2   Phosphorus mg/dL  --  3.4  --  3.9  --        Vancomycin Administrations:  vancomycin given in the last 96 hours                     vancomycin 1,250 mg in dextrose 5 % (D5W) 250 mL IVPB (Vial-Mate) (mg) 1,250 mg New Bag 04/07/23 1305    vancomycin SolR 125 mg (mg) 125 mg Given 04/07/23 1259     125 mg Given  0611     125 mg Given 04/06/23 2350     125 mg Given  1744    vancomycin 1,250 mg in dextrose 5 % (D5W) 250 mL IVPB (Vial-Mate) (mg) 1,250 mg New Bag 04/06/23 1604    vancomycin 1.75 g in 5 % dextrose 500 mL IVPB (mg) 1,750 mg New Bag 04/05/23 1949                    Microbiologic Results:  Microbiology Results (last 7 days)       Procedure Component Value Units Date/Time    Blood culture x two cultures. Draw prior to antibiotics. [[id number]]  (Abnormal) Collected: 04/05/23 1945    Order Status: Completed Specimen: Blood from Peripheral, Wrist, Right Updated: 04/07/23 1310     Blood Culture, Routine Gram stain susan bottle: Gram negative rods      Results called to and read back by: Ashley Nails RN 04/06/2023 14:48      PROTEUS MIRABILIS  For susceptibility see order #[id number]      Narrative:      Aerobic and anaerobic    Blood culture x two cultures. Draw prior to antibiotics. [[id number]]  (Abnormal) Collected: 04/05/23 8979    Order Status: Completed Specimen: Blood from Peripheral, Antecubital, Right Updated: 04/07/23 1310     Blood Culture, Routine Gram stain susan bottle:  Gram negative rods, Gram positive cocci      Results called to and read back by: Ashley Nails RN 04/06/2023  14:48      Gram stain aer bottle: Gram positive cocci and Gram negative rods      Positive results previously called 04/06/2023      STREPTOCOCCUS AGALACTIAE (GROUP B)  Susceptibility pending  Beta-hemolytic streptococci are routinely susceptible to   penicillins,cephalosporins and carbapenems.        PROTEUS MIRABILIS  Identification and susceptibility pending      Narrative:      Aerobic and anaerobic    C Diff Toxin by PCR [[id number]]  (Abnormal) Collected: 04/06/23 1145    Order Status: Completed Updated: 04/07/23 0520     C. diff PCR Positive    Culture, Anaerobe [[id number]] Collected: 04/06/23 1707    Order Status: Sent Specimen: Wound from Leg, Right Updated: 04/07/23 0302    Aerobic culture [[id number]] Collected: 04/06/23 1707    Order Status: Sent Specimen: Wound from Leg, Right Updated: 04/07/23 0302    Clostridium difficile EIA [[id number]]  (Abnormal) Collected: 04/06/23 1145    Order Status: Completed Specimen: Stool Updated: 04/06/23 1621     C. diff Antigen Positive     C difficile Toxins A+B, EIA Negative     Comment: Testing not recommended for children <24 months old.       Rapid Organism ID by PCR (from Blood culture) [[id number]]  (Abnormal) Collected: 04/05/23 1757    Order Status: Completed Updated: 04/06/23 1524     Enterococcus faecalis Detected     Enterococcus faecium Not Detected     Listeria Monocytogenes Not Detected     Staphylococcus spp. Not Detected     Staphylococcus aureus Not Detected     Staphylococcus epidermidis Not Detected     Staphylococcus lugdunensis Not Detected     Streptococcus species See species for ID     Streptococcus agalactiae Detected     Streptococcus pneumoniae Not Detected     Streptococcus pyogenes Not Detected     Acinetobacter calcoaceticus/baumannii complex Not Detected     Bacteroides fragilis Not Detected     Enterobacerales See species for ID      Enterobacter cloacae complex Not Detected     Escherichia Not Detected     Klebsiella aerogenes Not Detected     Klebsiella oxytoca Not Detected     Klebsiella pneumoniae group Not Detected     Proteus Detected     Salmonella sp Not Detected     Serratia marcescens Not Detected     Haemophilus influenzae Not Detected     Neisseria meningtidis Not Detected     Pseudomonas aeruginosa Not Detected     Stenotrophomonas maltophilia Not Detected     Candida albicans Not Detected     Candida auris Not Detected     Candida glabrata Not Detected     Candida krusei Not Detected     Candida parapsilosis Not Detected     Candida tropicalis Not Detected     Cryptococcus neoformans/gattii Not Detected     CTX-M (ESBL ) Not Detected     IMP (Carbapenem resistant) Not Detected     KPC resistance gene (Carbapenem resistant) Not Detected     mcr-1  Test Not Applicable     mec A/C  Test Not Applicable     mec A/C and MREJ (MRSA) gene Test Not Applicable     NDM (Carbapenem resistant) Not Detected     OXA-48-like (Carbapenem resistant) Not Detected     van A/B (VRE gene) Not Detected     VIM (Carbapenem resistant) Not Detected    Narrative:      Aerobic and anaerobic    Urine culture [[id number]] Collected: 04/06/23 0940    Order Status: No result Specimen: Urine Updated: 04/06/23 1049    Culture, Anaerobe [[id number]] Collected: 04/05/23 2245    Order Status: Sent Specimen: Wound from Leg, Right Updated: 04/06/23 5271

## 2023-04-07 NOTE — PLAN OF CARE
VSS overnight and afebrile.  Remains on levo, titrated per order.   Complaints of pain throughout night, PRN pain medication given per order. See mar.  All alarms audible and appropriate.  Call light in reach, instructed to call for assistance.

## 2023-04-07 NOTE — SUBJECTIVE & OBJECTIVE
ROS complete and negative unless stated in the interval HPI    Objective:     Vital Signs (Most Recent):  Temp: 98 °F (36.7 °C) (04/07/23 0400)  Pulse: 95 (04/07/23 0734)  Resp: (!) 22 (04/07/23 0734)  BP: (!) 110/56 (04/07/23 0734)  SpO2: 96 % (04/07/23 0734)   Vital Signs (24h Range):  Temp:  [98 °F (36.7 °C)-98.9 °F (37.2 °C)] 98 °F (36.7 °C)  Pulse:  [] 95  Resp:  [14-37] 22  SpO2:  [75 %-100 %] 96 %  BP: ()/(41-74) 110/56     Weight: 84.6 kg (186 lb 6.4 oz)  Body mass index is 26 kg/m².      Intake/Output Summary (Last 24 hours) at 4/7/2023 0806  Last data filed at 4/7/2023 0615  Gross per 24 hour   Intake 1929.57 ml   Output 2730 ml   Net -800.43 ml       Physical Exam  Vitals reviewed.   Constitutional:       General: He is sleeping. He is not in acute distress.     Appearance: He is ill-appearing.      Comments: Chronically ill appearing male lying in bed on NC in NAD   Cardiovascular:      Pulses:           Radial pulses are 1+ on the right side and 1+ on the left side.   Pulmonary:      Effort: Pulmonary effort is normal.      Breath sounds: No wheezing or rhonchi.      Comments: On NC  Abdominal:      General: There is no distension.      Palpations: Abdomen is soft.   Musculoskeletal:      Comments: BLE amputations    Skin:     General: Skin is warm and dry.   Neurological:      General: No focal deficit present.      Mental Status: He is easily aroused.            Lines/Drains/Airways       Central Venous Catheter Line  Duration             Percutaneous Central Line Insertion/Assessment - Triple Lumen  04/05/23 2140 Internal Jugular Right 1 day              Drain  Duration                  Hemodialysis AV Fistula 09/14/22 0717 Left upper arm 205 days    Female External Urinary Catheter 04/06/23 0700 1 day         Rectal Tube 04/06/23 1025 <1 day              Peripheral Intravenous Line  Duration                  Peripheral IV - Single Lumen 04/05/23 1750 20 G Right Antecubital 1 day                     Significant Labs:    CBC/Anemia Profile:  Recent Labs   Lab 04/05/23 1758 04/06/23 0412 04/07/23 0435   WBC 10.57 18.84* 10.23   HGB 10.0* 8.5* 8.4*   HCT 32.1* 27.8* 27.1*    250 233   MCV 83 82 81*   RDW 15.1* 14.9* 14.9*        Chemistries:  Recent Labs   Lab 04/05/23 1758 04/06/23 0412 04/06/23  1123 04/07/23 0435   * 129*  --  130*   K 4.5 5.0  --  3.9    100  --  101   CO2 16* 19*  --  18*   BUN 34* 35*  --  36*   CREATININE 2.2* 2.4*  --  2.3*   CALCIUM 9.0 8.5*  --  8.2*   ALBUMIN 2.6*  --   --   --    PROT 7.5  --   --   --    BILITOT 0.4  --   --   --    ALKPHOS 164*  --   --   --    ALT 31  --   --   --    AST 46*  --   --   --    MG  --  1.2*  --  2.2   PHOS  --  3.4 3.9  --        All pertinent labs within the past 24 hours have been reviewed.    Significant Imaging:  I have reviewed all pertinent imaging results/findings within the past 24 hours.

## 2023-04-07 NOTE — PROGRESS NOTES
[facility] - Intensive Care ([facility])  Critical Care Medicine  Progress Note    Patient Name: Lavelle Ladd  MRN: [id number]  Admission Date: 4/5/2023  Hospital Length of Stay: 2 days  Code Status: Full Code  Attending Provider: Jose Arcos MD  Primary Care Provider: Primary Doctor No   Principal Problem: Septic shock    Subjective:     HPI:  67 year old male with multiple medical issues including s/p renal transplant on cellcept and prograf; HTN; DM2; sergey lower extremity amputee    Presents to ED on evening of 4/5 with intractable vomiting. Was at [facility] with plan for d/c today but noted vomiting, fever, cough, body aches, diarrhea  ED evaluation revealed foul smelling drainage from rt stump wound  X ray concerning with osseous and soft tissue changes suspicious for osteo  Labs - wbc 10.5; lactate 2.7; procal 7; creatinine 2.2 (baseline 1.2)  blood cultures obtained  Treated with 2.5L IVF (plus 500ml from EMS), tylenol, vanco, rocephin  Refractory hypotension requiring pressor support    Critical care team contacted for admission for septic shock      Hospital/ICU Course:  4/6: remains on low dose levo at 0.02, pt with c/o pain to R stump this AM, with diarrhea - will check a c diff, resp stable on RA, pending nephro and ID consults   4/7: continues on low dose levo, C diff +, multiple organisms ID'd on blood rapid PCR, pt with no new issues or c/o      ROS complete and negative unless stated in the interval HPI    Objective:     Vital Signs (Most Recent):  Temp: 98 °F (36.7 °C) (04/07/23 0400)  Pulse: 95 (04/07/23 0734)  Resp: (!) 22 (04/07/23 0734)  BP: (!) 110/56 (04/07/23 0734)  SpO2: 96 % (04/07/23 0734)   Vital Signs (24h Range):  Temp:  [98 °F (36.7 °C)-98.9 °F (37.2 °C)] 98 °F (36.7 °C)  Pulse:  [] 95  Resp:  [14-37] 22  SpO2:  [75 %-100 %] 96 %  BP: ()/(41-74) 110/56     Weight: 84.6 kg (186 lb 6.4 oz)  Body mass index is 26 kg/m².      Intake/Output Summary (Last 24 hours) at  4/7/2023 0806  Last data filed at 4/7/2023 0615  Gross per 24 hour   Intake 1929.57 ml   Output 2730 ml   Net -800.43 ml       Physical Exam  Vitals reviewed.   Constitutional:       General: He is sleeping. He is not in acute distress.     Appearance: He is ill-appearing.      Comments: Chronically ill appearing male lying in bed on NC in NAD   Cardiovascular:      Pulses:           Radial pulses are 1+ on the right side and 1+ on the left side.   Pulmonary:      Effort: Pulmonary effort is normal.      Breath sounds: No wheezing or rhonchi.      Comments: On NC  Abdominal:      General: There is no distension.      Palpations: Abdomen is soft.   Musculoskeletal:      Comments: BLE amputations    Skin:     General: Skin is warm and dry.   Neurological:      General: No focal deficit present.      Mental Status: He is easily aroused.            Lines/Drains/Airways       Central Venous Catheter Line  Duration             Percutaneous Central Line Insertion/Assessment - Triple Lumen  04/05/23 2140 Internal Jugular Right 1 day              Drain  Duration                  Hemodialysis AV Fistula 09/14/22 0717 Left upper arm 205 days    Female External Urinary Catheter 04/06/23 0700 1 day         Rectal Tube 04/06/23 1025 <1 day              Peripheral Intravenous Line  Duration                  Peripheral IV - Single Lumen 04/05/23 1750 20 G Right Antecubital 1 day                    Significant Labs:    CBC/Anemia Profile:  Recent Labs   Lab 04/05/23 1758 04/06/23  0412 04/07/23  0435   WBC 10.57 18.84* 10.23   HGB 10.0* 8.5* 8.4*   HCT 32.1* 27.8* 27.1*    250 233   MCV 83 82 81*   RDW 15.1* 14.9* 14.9*        Chemistries:  Recent Labs   Lab 04/05/23 1758 04/06/23  0412 04/06/23  1123 04/07/23  0435   * 129*  --  130*   K 4.5 5.0  --  3.9    100  --  101   CO2 16* 19*  --  18*   BUN 34* 35*  --  36*   CREATININE 2.2* 2.4*  --  2.3*   CALCIUM 9.0 8.5*  --  8.2*   ALBUMIN 2.6*  --   --   --     PROT 7.5  --   --   --    BILITOT 0.4  --   --   --    ALKPHOS 164*  --   --   --    ALT 31  --   --   --    AST 46*  --   --   --    MG  --  1.2*  --  2.2   PHOS  --  3.4 3.9  --        All pertinent labs within the past 24 hours have been reviewed.    Significant Imaging:  I have reviewed all pertinent imaging results/findings within the past 24 hours.      ABG  No results for input(s): PH, PO2, PCO2, HCO3, BE in the last 168 hours.  Assessment/Plan:     Renal/   donor kidney transplant for DM 16, DINORAH on CKD  - on cellcept and prograf at baseline, prograf level <2 and cellcept level pending   - baseline creatinine 1.2, at time of admit crt  2.2  - DINORAH likely s/t septic shock, support for MAP > 65  - renal dosing of medications and avoid nephrotoxic agents as able   - nephrology consulted   - prograf resumed, cellcept on hold s/t acute infection   - replete lytes as needed    ID  * Septic shock  - ? osteomyelitis to R stump, currently on vanc and rocephin  - c diff +, on PO vanc x10 days  - follow culture data, continue empiric abx  - continue IVF, allow PO intake  - continue levo for MAP goal > 65    Clostridium difficile infection  - admitted , tested  with + PCR  - initiated on PO vanc , continue for 10 days  - isolation precautions   - flexiseal in place  - WBC and procal improved     Osteomyelitis  - history of with possible recurrence to right stump/tib fib  - blood cultures and wound drainage culture pending   - continue with empiric vanco and rocephin  - would care and ID consulted     Immunology/Multi System  Immunosuppression  - see plan for renal transplant     Endocrine  Type 2 diabetes mellitus with hyperglycemia, with long-term current use of insulin  - SSI, glycemic trends stable, continue to monitor       Prophylaxis Measures:  GI ppx: Famotidine  VTE ppx: Heparin  Glucose control: Insulin subcutaneous    Code Status: Full Code      Critical Care Time: 45 minutes  Critical  secondary to Patient has a condition that poses threat to life and bodily function: septic shock on levo      Critical care was time spent personally by me on the following activities: development of treatment plan with patient or surrogate and bedside caregivers, discussions with consultants, evaluation of patient's response to treatment, examination of patient, ordering and performing treatments and interventions, ordering and review of laboratory studies, ordering and review of radiographic studies, pulse oximetry, re-evaluation of patient's condition. This critical care time did not overlap with that of any other provider or involve time for any procedures.     Micah Umanzor NP  Critical Care Medicine  '[place] - Intensive Care ([facility])

## 2023-04-07 NOTE — ASSESSMENT & PLAN NOTE
- ? osteomyelitis to R stump, currently on vanc and rocephin  - c diff +, on PO vanc x10 days  - follow culture data, continue empiric abx  - continue IVF, allow PO intake  - continue levo for MAP goal > 65

## 2023-04-07 NOTE — ASSESSMENT & PLAN NOTE
- admitted 4/5, tested 4/6 with + PCR  - initiated on PO vanc 4/6, continue for 10 days  - isolation precautions   - flexiseal in place  - WBC and procal improved

## 2023-04-08 PROBLEM — R78.81 BACTEREMIA: Status: ACTIVE | Noted: 2023-01-01

## 2023-04-08 NOTE — ASSESSMENT & PLAN NOTE
Microbiology Results (last 7 days)     Procedure Component Value Units Date/Time    Culture, Anaerobe [[id number]] Collected: 04/06/23 1707    Order Status: Completed Specimen: Wound from Leg, Right Updated: 04/08/23 1357     Anaerobic Culture Culture in progress    Narrative:      Right stump wound    Blood culture x two cultures. Draw prior to antibiotics. [[id number]]  (Abnormal)  (Susceptibility) Collected: 04/05/23 1757    Order Status: Completed Specimen: Blood from Peripheral, Antecubital, Right Updated: 04/08/23 1019     Blood Culture, Routine Gram stain susan bottle: Gram negative rods, Gram positive cocci      Results called to and read back by: Ashley Nails RN 04/06/2023  14:48      Gram stain aer bottle: Gram positive cocci and Gram negative rods      Positive results previously called 04/06/2023      STREPTOCOCCUS AGALACTIAE (GROUP B)  Susceptibility pending  Beta-hemolytic streptococci are routinely susceptible to   penicillins,cephalosporins and carbapenems.        PROTEUS MIRABILIS    Narrative:      Aerobic and anaerobic    Blood culture x two cultures. Draw prior to antibiotics. [[id number]]  (Abnormal) Collected: 04/05/23 1945    Order Status: Completed Specimen: Blood from Peripheral, Wrist, Right Updated: 04/08/23 1019     Blood Culture, Routine Gram stain susan bottle: Gram negative rods      Results called to and read back by: Ashley Nails RN 04/06/2023 14:48      Gram stain aer bottle: Gram negative rods      Positive results previously called 04/07/2023  20:26      PROTEUS MIRABILIS  For susceptibility see order #[id number]      Narrative:      Aerobic and anaerobic    Aerobic culture [[id number]]  (Abnormal) Collected: 04/06/23 1707    Order Status: Completed Specimen: Wound from Leg, Right Updated: 04/08/23 0738     Aerobic Bacterial Culture GRAM NEGATIVE MIGUEL ANGEL  Moderate  Identification and susceptibility pending  Skin skylar also present      Narrative:      Right stump wound    Urine culture  [[id number]] Collected: 04/06/23 0940    Order Status: Completed Specimen: Urine Updated: 04/1953     Urine Culture, Routine No growth    Narrative:      Specimen Source->Urine    C Diff Toxin by PCR [[id number]]  (Abnormal) Collected: 04/06/23 1145    Order Status: Completed Updated: 04/07/23 0520     C. diff PCR Positive    Clostridium difficile EIA [[id number]]  (Abnormal) Collected: 04/06/23 1145    Order Status: Completed Specimen: Stool Updated: 04/06/23 1621     C. diff Antigen Positive     C difficile Toxins A+B, EIA Negative     Comment: Testing not recommended for children <24 months old.       Rapid Organism ID by PCR (from Blood culture) [[id number]]  (Abnormal) Collected: 04/05/23 1757    Order Status: Completed Updated: 04/06/23 1524     Enterococcus faecalis Detected     Enterococcus faecium Not Detected     Listeria Monocytogenes Not Detected     Staphylococcus spp. Not Detected     Staphylococcus aureus Not Detected     Staphylococcus epidermidis Not Detected     Staphylococcus lugdunensis Not Detected     Streptococcus species See species for ID     Streptococcus agalactiae Detected     Streptococcus pneumoniae Not Detected     Streptococcus pyogenes Not Detected     Acinetobacter calcoaceticus/baumannii complex Not Detected     Bacteroides fragilis Not Detected     Enterobacerales See species for ID     Enterobacter cloacae complex Not Detected     Escherichia Not Detected     Klebsiella aerogenes Not Detected     Klebsiella oxytoca Not Detected     Klebsiella pneumoniae group Not Detected     Proteus Detected     Salmonella sp Not Detected     Serratia marcescens Not Detected     Haemophilus influenzae Not Detected     Neisseria meningtidis Not Detected     Pseudomonas aeruginosa Not Detected     Stenotrophomonas maltophilia Not Detected     Candida albicans Not Detected     Candida auris Not Detected     Candida glabrata Not Detected     Candida krusei Not Detected     Candida parapsilosis Not  Detected     Candida tropicalis Not Detected     Cryptococcus neoformans/gattii Not Detected     CTX-M (ESBL ) Not Detected     IMP (Carbapenem resistant) Not Detected     KPC resistance gene (Carbapenem resistant) Not Detected     mcr-1  Test Not Applicable     mec A/C  Test Not Applicable     mec A/C and MREJ (MRSA) gene Test Not Applicable     NDM (Carbapenem resistant) Not Detected     OXA-48-like (Carbapenem resistant) Not Detected     van A/B (VRE gene) Not Detected     VIM (Carbapenem resistant) Not Detected    Narrative:      Aerobic and anaerobic    Culture, Anaerobe [[id number]] Collected: 04/05/23 9484    Order Status: Sent Specimen: Wound from Leg, Right Updated: 04/06/23 0224          ID on board  Cont IVAB

## 2023-04-08 NOTE — ASSESSMENT & PLAN NOTE
- on cellcept and prograf at baseline; prograf resumed, cellcept on hold s/t acute infection   - DINORAH likely s/t septic shock, support for MAP > 65  - renal dosing of medications and avoid nephrotoxic agents as able   - nephrology consulted   - replete lytes as needed

## 2023-04-08 NOTE — ASSESSMENT & PLAN NOTE
- history of with possible recurrence to right stump/tib fib  - blood cultures + and wound drainage culture + with final results and sensitivities pending   - continue with empiric vanco and rocephin  - wound care and ID consulted

## 2023-04-08 NOTE — ASSESSMENT & PLAN NOTE
Will treat for 6 weeks with Rocephin /flagyl   Will need close follow up.  Will need MRI of the right knee stump when feasible to confirm osteomyelitis

## 2023-04-08 NOTE — ASSESSMENT & PLAN NOTE
Patient's FSGs are controlled on current medication regimen.  Last A1c reviewed-   Lab Results   Component Value Date    HGBA1C 6.7 (H) 04/05/2023     Most recent fingerstick glucose reviewed-   Recent Labs   Lab 04/07/23  1727 04/07/23  2141 04/08/23  0803 04/08/23  1118   POCTGLUCOSE 184* 171* 106 228*     Current correctional scale  Medium  Maintain anti-hyperglycemic dose as follows-   Antihyperglycemics (From admission, onward)    Start     Stop Route Frequency Ordered    04/07/23 0823  insulin aspart U-100 pen 1-10 Units         -- SubQ Before meals & nightly PRN 04/07/23 0724        Hold Oral hypoglycemics while patient is in the hospital.

## 2023-04-08 NOTE — HPI
68 y/o WM with a PMHx of CAD, CHF, COPD, kidney transplant, HLD, HTN, PAD, PVD,  B/L LE BKA  and DM2 Presents to ED on 4/5 with intractable vomiting. Was at [facility] with plan for d/c  but noted vomiting, fever, cough, body aches, diarrhea.ED evaluation revealed foul smelling drainage from rt stump wound.X ray concerning with osseous and soft tissue changes suspicious for osteo.Labs - wbc 10.5; lactate 2.7; procal 7; creatinine 2.2 (baseline 1.2) blood cultures obtained.Treated with 2.5L IVF (plus 500ml from EMS), tylenol, vanco, rocephin.Refractory hypotension requiring pressor support and admitted to ICU. ID and nephrology cosnulted . Blood cx (+) for strep and proteus  .star Wound cx (+) for GNR. Cdiff PCR positive . He is on IV vanc , rocephin , po vanc   and po metronidazole .   Pt has been   admitted multiple times since 8 /4/22  in multiples facility including  [facility], [facility]  and  [facility] .    IM consulted to assume care .

## 2023-04-08 NOTE — CONSULTS
[facility] - Intensive Care ([facility])  Infectious Disease  Consult Note    Patient Name: Lavelle Ladd  MRN: [id number]  Admission Date: 2023  Hospital Length of Stay: 3 days  Attending Physician: Jose Arcos MD  Primary Care Provider: Primary Doctor No     Isolation Status: Special Contact    Patient information was obtained from patient, past medical records and ER records.      Consults  Assessment/Plan:     Renal/   donor kidney transplant for DM 16, DINORAH on CKD  Will follow transplant team     ID  * Septic shock  Follow critical care team ,vasopressor support as needed    Clostridium difficile infection  Will give 10 days of po vanco-  He will also be on po flagyl for 6 weeks    Osteomyelitis  Will treat for 6 weeks with Rocephin /flagyl   Will need close follow up.  Will need MRI of the right knee stump when feasible to confirm osteomyelitis     Endocrine  Type 2 diabetes mellitus with hyperglycemia, with long-term current use of insulin  Insulin regime as per primary team        Thank you for your consult. I will follow-up with patient. Please contact us if you have any additional questions.  -late entry note  Ronny Veliz MD, [facility]  Infectious Disease  [facility] - Intensive Care ([facility])    Subjective:     Principal Problem: Septic shock    HPI:   67 year old male with  history of  renal transplant on cellcept and prograf; HTN; DM2; sergey lower extremity amputee admitted - with with intractable vomiting. He was  at [facility] and presented with  foul smelling drainage from the stump wound.  Labs and imaging test reviewed-  Blood cultures- strep and proteus  Wound cultures- proteus.BACTEROIDES   X ray concerning with osseous and soft tissue changes suspicious for osteo  C difficle assay is positive   He was admitted in the ICU with septic shock .  Component      Latest Ref Rng & Units 2023              WBC      3.90 - 12.70 K/uL 10.23 18.84 (H)       No new  subjective & objective note has been filed under this hospital service since the last note was generated.

## 2023-04-08 NOTE — ASSESSMENT & PLAN NOTE
S/p vasopressor  Etiology = bacteremia , wound infection ,C,diff and osteo   Cont IVAB   ID on board

## 2023-04-08 NOTE — ASSESSMENT & PLAN NOTE
- resolved, off levo since yesterday evening  - bacteremia with proteus and group B strep  - RLE wound culture with GNR  - currently on vanc and rocephin, continue  - c diff +, on PO vanc x10 days

## 2023-04-08 NOTE — PROGRESS NOTES
[facility] - Intensive Care ([facility])  Nephrology  Progress Note    Patient Name: Lavelle Ladd  MRN: [id number]  Admission Date: 4/5/2023  Hospital Length of Stay: 3 days  Attending Provider: Jose Arcos MD   Primary Care Physician: Primary Doctor No  Principal Problem:Septic shock    Consults  Subjective:     Interval History:  Patient was seen in the intensive care unit.  In bed resting comfortably.  No acute distress noted.  Now off pressors.    Review of systems:  No new complaints from overnight.  No shortness of breath or chest discomfort.  No fevers or chills.  No nausea vomiting.  No swelling.    Review of patient's allergies indicates:  No Known Allergies  Current Facility-Administered Medications   Medication Frequency    0.9%  NaCl infusion PRN    acetaminophen tablet 650 mg Q6H PRN    cefTRIAXone (ROCEPHIN) 2 g in dextrose 5 % in water (D5W) 5 % 50 mL IVPB (MB+) Q24H    dextrose 10% bolus 125 mL 125 mL PRN    dextrose 10% bolus 125 mL 125 mL PRN    dextrose 10% bolus 250 mL 250 mL PRN    dextrose 10% bolus 250 mL 250 mL PRN    famotidine tablet 20 mg Daily    gabapentin capsule 300 mg TID    glucagon (human recombinant) injection 1 mg PRN    heparin (porcine) injection 5,000 Units Q8H    HYDROcodone-acetaminophen 5-325 mg per tablet 1 tablet Q4H PRN    influenza 65up-adj (QUADRIVALENT ADJUVANTED PF) vaccine 0.5 mL vaccine x 1 dose    insulin aspart U-100 pen 1-10 Units QID (AC + HS) PRN    metroNIDAZOLE tablet 500 mg Q8H    mupirocin 2 % ointment BID    NORepinephrine 4 mg in dextrose 5% 250 mL infusion (premix) (titrating) Continuous    ondansetron injection 4 mg Q6H PRN    sodium bicarbonate tablet 1,300 mg BID    tacrolimus capsule 1 mg BID    vancomycin - pharmacy to dose pharmacy to manage frequency    vancomycin SolR 125 mg Q6H       Objective:     Vital Signs (Most Recent):  Temp: 97.2 °F (36.2 °C) (04/08/23 0705)  Pulse: 79 (04/08/23 0721)  Resp: 16 (04/08/23 0721)  BP: 129/62 (04/08/23  0700)  SpO2: (!) 94 % (04/08/23 0721)   Vital Signs (24h Range):  Temp:  [97.2 °F (36.2 °C)-99 °F (37.2 °C)] 97.2 °F (36.2 °C)  Pulse:  [71-88] 79  Resp:  [10-34] 16  SpO2:  [90 %-99 %] 94 %  BP: ()/(49-74) 129/62     Weight: 84.6 kg (186 lb 6.4 oz) (04/05/23 1931)  Body mass index is 26 kg/m².  Body surface area is 2.06 meters squared.    I/O last 3 completed shifts:  In: 2844 [I.V.:1516.5; IV Piggyback:1327.5]  Out: 5640 [Urine:5390; Stool:250]    Physical Exam  Constitutional:       Appearance: Normal appearance.   HENT:      Head: Normocephalic and atraumatic.   Eyes:      General: No scleral icterus.     Extraocular Movements: Extraocular movements intact.      Pupils: Pupils are equal, round, and reactive to light.   Cardiovascular:      Rate and Rhythm: Normal rate and regular rhythm.   Pulmonary:      Effort: Pulmonary effort is normal.      Breath sounds: Normal breath sounds.   Musculoskeletal:      Right lower leg: No edema.      Left lower leg: No edema.   Skin:     General: Skin is warm and dry.   Neurological:      General: No focal deficit present.      Mental Status: He is alert and oriented to person, place, and time.   Psychiatric:         Mood and Affect: Mood normal.         Behavior: Behavior normal.       Significant Labs:sureBMP:   Recent Labs   Lab 04/08/23 0447   *   *   K 4.1      CO2 20*   BUN 34*   CREATININE 2.1*   CALCIUM 8.5*   MG 2.1       CMP:   Recent Labs   Lab 04/05/23  1758 04/06/23  0412 04/08/23 0447   GLU 92   < > 113*   CALCIUM 9.0   < > 8.5*   ALBUMIN 2.6*  --   --    PROT 7.5  --   --    *   < > 135*   K 4.5   < > 4.1   CO2 16*   < > 20*      < > 107   BUN 34*   < > 34*   CREATININE 2.2*   < > 2.1*   ALKPHOS 164*  --   --    ALT 31  --   --    AST 46*  --   --    BILITOT 0.4  --   --     < > = values in this interval not displayed.       All labs within the past 24 hours have been reviewed.    Significant Imaging:  Labs:  Reviewed      Assessment/Plan:     Active Diagnoses:    Diagnosis Date Noted POA    PRINCIPAL PROBLEM:  Septic shock [A41.9, R65.21] 2018 Yes    Clostridium difficile infection [A49.8] 2023 Yes    Osteomyelitis [M86.9] 11/10/2018 Yes    Immunosuppression [D84.9]  Yes    Type 2 diabetes mellitus with hyperglycemia, with long-term current use of insulin [E11.65, Z79.4]  Not Applicable     donor kidney transplant for DM 16, DINORAH on CKD [Z94.0]  Not Applicable     Chronic      Problems Resolved During this Admission:       Assessment and plan:    1. Kidney transplant status: Status post kidney transplant in 2016.  Baseline creatinine appears run between about 1.2 and 1.4.    2. DINORAH:  Creatinine has remained relatively stable.  Improving slightly decreasing from 2.3 down to 2.1 this morning.  There may be a component of ATN on top of prerenal azotemia.  He is nonoliguric with 3.9 L urine output yesterday.    3. Immunosuppression:  Prograf dose was recently adjusted.  He is currently on 1 mg twice a day.  Prograf level was in acceptable range of 5.5.    4. Electrolytes: Potassium is stable at 4.1.  Bicarbonate is slightly low at 20.       Thank you for your consult.     Jose David Hooker MD  Nephrology  [facility] - Intensive Care ([facility])

## 2023-04-08 NOTE — PROGRESS NOTES
[facility] - Int[facility] Care ([facility])  Critical Care Medicine  Progress Note    Patient Name: Lavelle Ladd  MRN: [id number]  Admission Date: 4/5/2023  Hospital Length of Stay: 3 days  Code Status: Full Code  Attending Provider: Jose Arcos MD  Primary Care Provider: Primary Doctor No   Principal Problem: Septic shock    Subjective:     HPI:  67 year old male with multiple medical issues including s/p renal transplant on cellcept and prograf; HTN; DM2; sergey lower extremity amputee    Presents to ED on evening of 4/5 with intractable vomiting. Was at [facility] with plan for d/c today but noted vomiting, fever, cough, body aches, diarrhea  ED evaluation revealed foul smelling drainage from rt stump wound  X ray concerning with osseous and soft tissue changes suspicious for osteo  Labs - wbc 10.5; lactate 2.7; procal 7; creatinine 2.2 (baseline 1.2)  blood cultures obtained  Treated with 2.5L IVF (plus 500ml from EMS), tylenol, vanco, rocephin  Refractory hypotension requiring pressor support    Critical care team contacted for admission for septic shock      Hospital/ICU Course:  4/6: remains on low dose levo at 0.02, pt with c/o pain to R stump this AM, with diarrhea - will check a c diff, resp stable on RA, pending nephro and ID consults   4/7: continues on low dose levo, C diff +, multiple organisms ID'd on blood rapid PCR, pt with no new issues or c/o  4/8: off levo since yesterday afternoon, blood cutlures with proteus and group B strep, R leg would culture with GNR, no new issues or c/o, stable for transfer out of the ICU to the floor       ROS complete and negative unless stated in the interval HPI    Objective:     Vital Signs (Most Recent):  Temp: 98.3 °F (36.8 °C) (04/08/23 0300)  Pulse: 79 (04/08/23 0721)  Resp: 16 (04/08/23 0721)  BP: 129/62 (04/08/23 0600)  SpO2: (!) 94 % (04/08/23 0721)   Vital Signs (24h Range):  Temp:  [97.8 °F (36.6 °C)-99 °F (37.2 °C)] 98.3 °F (36.8 °C)  Pulse:  [71-95]  79  Resp:  [10-34] 16  SpO2:  [90 %-99 %] 94 %  BP: ()/(39-74) 129/62     Weight: 84.6 kg (186 lb 6.4 oz)  Body mass index is 26 kg/m².      Intake/Output Summary (Last 24 hours) at 4/8/2023 0806  Last data filed at 4/8/2023 0500  Gross per 24 hour   Intake 1699.72 ml   Output 3990 ml   Net -2290.28 ml       Physical Exam  Vitals reviewed.   Constitutional:       General: He is awake. He is not in acute distress.     Comments: Chronically ill appearing male lying in bed on RA in NAD   Cardiovascular:      Rate and Rhythm: Normal rate.      Pulses:           Radial pulses are 2+ on the right side and 2+ on the left side.   Pulmonary:      Effort: Pulmonary effort is normal.      Breath sounds: Normal breath sounds.      Comments: On RA  Abdominal:      General: There is no distension.      Palpations: Abdomen is soft.   Musculoskeletal:      Right lower leg: No edema.      Left lower leg: No edema.      Comments: BLE amputations   Skin:     General: Skin is warm and dry.   Neurological:      General: No focal deficit present.      Mental Status: He is alert.   Psychiatric:         Behavior: Behavior is cooperative.          Lines/Drains/Airways       Central Venous Catheter Line  Duration             Percutaneous Central Line Insertion/Assessment - Triple Lumen  04/05/23 2140 Internal Jugular Right 2 days              Drain  Duration                  Hemodialysis AV Fistula 09/14/22 0717 Left upper arm 206 days         Rectal Tube 04/07/23 1415 <1 day                    Significant Labs:    CBC/Anemia Profile:  Recent Labs   Lab 04/07/23  0435 04/08/23  0447   WBC 10.23 6.05   HGB 8.4* 8.4*   HCT 27.1* 27.8*    209   MCV 81* 83   RDW 14.9* 15.1*        Chemistries:  Recent Labs   Lab 04/06/23  1123 04/07/23  0435 04/08/23  0447   NA  --  130* 135*   K  --  3.9 4.1   CL  --  101 107   CO2  --  18* 20*   BUN  --  36* 34*   CREATININE  --  2.3* 2.1*   CALCIUM  --  8.2* 8.5*   MG  --  2.2 2.1   PHOS 3.9  --    --        All pertinent labs within the past 24 hours have been reviewed.    Significant Imaging:  I have reviewed all pertinent imaging results/findings within the past 24 hours.      ABG  No results for input(s): PH, PO2, PCO2, HCO3, BE in the last 168 hours.  Assessment/Plan:     Renal/   donor kidney transplant for DM 16, DINORAH on CKD  - on cellcept and prograf at baseline; prograf resumed, cellcept on hold s/t acute infection   - DINORAH likely s/t septic shock, support for MAP > 65  - renal dosing of medications and avoid nephrotoxic agents as able   - nephrology consulted   - replete lytes as needed    ID  * Septic shock  - resolved, off levo since yesterday evening  - bacteremia with proteus and group B strep  - RLE wound culture with GNR  - currently on vanc and rocephin, continue  - c diff +, on PO vanc x10 days    Clostridium difficile infection  - admitted , tested  with + PCR  - initiated on PO vanc , continue for 10 days  - isolation precautions     Osteomyelitis  - history of with possible recurrence to right stump/tib fib  - blood cultures + and wound drainage culture + with final results and sensitivities pending   - continue with empiric vanco and rocephin  - wound care and ID consulted     Immunology/Multi System  Immunosuppression  - see plan for renal transplant     Endocrine  Type 2 diabetes mellitus with hyperglycemia, with long-term current use of insulin  - SSI, glycemic trends stable, continue to monitor     Prophylaxis Measures:  GI ppx: Famotidine  VTE ppx: Heparin  Glucose control: Insulin subcutaneous    Code Status: Full Code    Patient stable for care outside of the ICU setting. [facility] consulted. Critical Care team will sign off at this time as patient's critical care issues have resolved and patient is appropriate for ongoing management outside of the ICU setting. Appreciate Hospital Medicine. Please call if the patient's condition should change and warrant  reevaluation.        Micah Umanzor NP  Critical Care Medicine  [facility] - Intensive Care ([facility])

## 2023-04-08 NOTE — PLAN OF CARE
Problem: Fall Injury Risk  Goal: Absence of Fall and Fall-Related Injury  4/8/2023 0701 by Adolfo Wu RN  Outcome: Ongoing, Progressing  4/8/2023 0701 by Adolfo Wu RN  Outcome: Ongoing, Progressing     Problem: Adult Inpatient Plan of Care  Goal: Optimal Comfort and Wellbeing  4/8/2023 0701 by Adolfo Wu RN  Outcome: Ongoing, Progressing  4/8/2023 0701 by Adolfo Wu RN  Outcome: Ongoing, Progressing     Problem: Adult Inpatient Plan of Care  Goal: Plan of Care Review  4/8/2023 0701 by Adolfo Wu RN  Outcome: Ongoing, Progressing  4/8/2023 0701 by Adolfo Wu RN  Outcome: Ongoing, Progressing     Pt AAOx4 on room air. BP stable. Afebrile. Continuous pain to the right stomp throughout night given PRN Norco 5mg q 4 hrs. Rectal tube in place due to liquid diarrhea. Output about 100ml overnight.

## 2023-04-08 NOTE — CONSULTS
[facility] ([facility])  Hospital Medicine  Consult Note    Patient Name: Lavelle Ladd  MRN: [id number]  Admission Date: 4/5/2023  Hospital Length of Stay: 3 days  Attending Physician: Tahir Carmichael, *   Primary Care Provider: Primary Doctor No           Patient information was obtained from patient, past medical records and ER records.     Consults  Subjective:     Principal Problem: Septic shock    Chief Complaint:   Chief Complaint   Patient presents with    Emesis     Supposed to be discharged from [place] today. Upon entering van to go home, patient vomited. Coughing x days, chest discomfort and body aches. Given 4mg zofran and 500cc IVF by [facility]        HPI: 66 y/o WM with a PMHx of CAD, CHF, COPD, kidney transplant, HLD, HTN, PAD, PVD,  B/L LE BKA  and DM2 Presents to ED on 4/5 with intractable vomiting. Was at [facility] with plan for d/c  but noted vomiting, fever, cough, body aches, diarrhea.ED evaluation revealed foul smelling drainage from rt stump wound.X ray concerning with osseous and soft tissue changes suspicious for osteo.Labs - wbc 10.5; lactate 2.7; procal 7; creatinine 2.2 (baseline 1.2) blood cultures obtained.Treated with 2.5L IVF (plus 500ml from EMS), tylenol, vanco, rocephin.Refractory hypotension requiring pressor support and admitted to ICU. ID and nephrology cosnulted . Blood cx (+) for strep and proteus  .star Wound cx (+) for GNR. Cdiff PCR positive . He is on IV vanc , rocephin , po vanc   and po metronidazole .   Pt has been   admitted multiple times since 8 /4/22  in multiples facility including  [facility], [facility]  and  [facility] .    IM consulted to assume care .             Past Medical History:   Diagnosis Date    DINORAH (acute kidney injury) 9/25/2016    Arthritis     CAD in native artery 7/21/2019    CHF (congestive heart failure)     Chronic obstructive pulmonary disease 9/12/2016    Coronary artery disease involving native coronary  artery of native heart without angina pectoris 2016    CRI (chronic renal insufficiency) 2019     donor kidney transplant for DM 16     Induction with Thymo x3 and IV solumedrol to total 875mg  Kidney Biopsy  2016: 16 glomeruli, ACR type 1 AVR type 2, significant microcirculatory changes, c4d negative, No DSA, 5 to10% fibrosis. Treated with thymo x8 2016- no rejection      Diastolic heart failure     Encounter for blood transfusion     ESRD on RRT since 10/2013 10/29/2013    Biopsy proven diabetic nephropathy and lymphoplasmacytic interstitial infiltrate not c/w with AIN (ddx sjogrens or assoc with tamm-horsefall protein extravasation)     GERD (gastroesophageal reflux disease)     History of hepatitis C, s/p successful Rx w/ SVR12 - 2017    Completed 12 weeks harvoni w/ SVR    Hyperlipidemia     Hypertension     PAD (peripheral artery disease) 2019    PIC line (peripherally inserted central catheter) flush     Prophylactic immunotherapy     Proteinuria     PVD (peripheral vascular disease) 2017    RLE BKA CT 16 Extensive atherosclerotic disease of the aorta and mesenteric arteries.     Renal hypertension     Type 2 diabetes mellitus with diabetic neuropathy, with long-term current use of insulin 2016    Vitamin B12 deficiency        Past Surgical History:   Procedure Laterality Date    ANGIOGRAPHY OF LOWER EXTREMITY N/A 2022    Procedure: ANGIOGRAM, LOWER EXTREMITY/left leg;  Surgeon: Donal Mcdonald MD;  Location: [facility] CATH LAB;  Service: Cardiovascular;  Laterality: N/A;    ANGIOGRAPHY OF LOWER EXTREMITY N/A 2022    Procedure: ANGIOGRAM, LOWER EXTREMITY/left leg;  Surgeon: Donal Mcdonald MD;  Location: [facility] CATH LAB;  Service: Peripheral Vascular;  Laterality: N/A;  resched from  pt ready now    AORTOGRAPHY WITH SERIALOGRAPHY N/A 2018    Procedure: LEFT LEG ANGIOGRAM;  Surgeon: Donal Mcdonald MD;  Location: [facility] CATH  LAB;  Service: Vascular;  Laterality: N/A;    APPLICATION OF LARGE EXTERNAL FIXATION DEVICE TO TIBIA Left 8/4/2022    Procedure: APPLICATION, EXTERNAL FIXATION DEVICE, LARGE, TIBIA;  Surgeon: Good Villa MD;  Location: [facility] OR;  Service: Orthopedics;  Laterality: Left;    av bovine graft      Left UE    AV FISTULA PLACEMENT      left UE    BELOW KNEE AMPUTATION OF LOWER EXTREMITY Left 10/6/2022    Procedure: AMPUTATION, BELOW KNEE;  Surgeon: Donal Mcdonald MD;  Location: [facility];  Service: Vascular;  Laterality: Left;    CARDIAC CATHETERIZATION  02/2015    CLOSED REDUCTION OF INJURY OF TIBIA Left 8/4/2022    Procedure: CLOSED REDUCTION, TIBIA;  Surgeon: Good Villa MD;  Location: [facility] OR;  Service: Orthopedics;  Laterality: Left;  Closed reduction left tibial and fibular shaft fractures    CLOSURE OF WOUND Left 9/24/2018    Procedure: CLOSURE, WOUND;  Surgeon: Karla Wheeler DPM;  Location: [facility] OR;  Service: Podiatry;  Laterality: Left;  Secondary Wound closure, extensive    CLOSURE OF WOUND Left 11/5/2018    Procedure: CLOSURE, WOUND;  Surgeon: Karla Wheeler DPM;  Location: [facility] OR;  Service: Podiatry;  Laterality: Left;    DEBRIDEMENT OF MULTIPLE METATARSAL BONES Left 11/5/2018    Procedure: DEBRIDEMENT, METATARSAL BONE, 2 OR MORE BONES;  Surgeon: Karla Wheeler DPM;  Location: [facility] OR;  Service: Podiatry;  Laterality: Left;    EXCISION OF SKIN Left 9/27/2019    Procedure: EXCISION, SKIN;  Surgeon: Lenard Alarcon MD;  Location: [address];  Service: Plastics;  Laterality: Left;  Plastics set, NIMS monitor, [employer]    FOOT AMPUTATION THROUGH METATARSAL Left 9/21/2018    Procedure: AMPUTATION, FOOT, TRANSMETATARSAL;  Surgeon: Karla Wheeler DPM;  Location: [facility] OR;  Service: Podiatry;  Laterality: Left;    FOOT AMPUTATION THROUGH METATARSAL Left 10/31/2018    Procedure: AMPUTATION, FOOT, TRANSMETATARSAL;  Surgeon: Karla Wheeler DPM;  Location: [facility] OR;  Service:  Podiatry;  Laterality: Left;    FOOT AMPUTATION THROUGH METATARSAL Left 11/5/2018    Procedure: AMPUTATION, FOOT, TRANSMETATARSAL;  Surgeon: Karla Wheeler DPM;  Location: [facility] OR;  Service: Podiatry;  Laterality: Left;  revisional transmetatarsal amputation, Left foot    IRRIGATION AND DEBRIDEMENT OF LOWER EXTREMITY Left 10/31/2018    Procedure: IRRIGATION AND DEBRIDEMENT, LOWER EXTREMITY;  Surgeon: Karla Wheeler DPM;  Location: [facility] OR;  Service: Podiatry;  Laterality: Left;    KIDNEY TRANSPLANT  05/21/2016    LEFT HEART CATHETERIZATION Left 7/21/2019    Procedure: CATHETERIZATION, HEART, LEFT;  Surgeon: Andrew Valdez MD;  Location: [facility] CATH LAB;  Service: Cardiology;  Laterality: Left;    LEG AMPUTATION THROUGH KNEE  2011    right LE, started as nail puncture leading to diabetic ulcer    REMOVAL OF EXTERNAL FIXATION DEVICE Left 9/17/2022    Procedure: REMOVAL, EXTERNAL FIXATION DEVICE;  Surgeon: Kathleen Zazueta MD;  Location: [facility] OR;  Service: Orthopedics;  Laterality: Left;    SKIN FULL THICKNESS GRAFT Left 10/7/2019    Procedure: APPLICATION, GRAFT, SKIN, FULL-THICKNESS;  Surgeon: Lenard Alarcon MD;  Location: [address];  Service: Plastics;  Laterality: Left;    SURGICAL REMOVAL OF LESION OF FACE Right 10/7/2019    Procedure: EXCISION, LESION, FACE;  Surgeon: Lenard Alarcon MD;  [facility]: [facility];  Service: Plastics;  Laterality: Right;       Review of patient's allergies indicates:  No Known Allergies    No current facility-administered medications on file prior to encounter.     Current Outpatient Medications on File Prior to Encounter   Medication Sig    acetaminophen (TYLENOL) 500 MG tablet Take 2 tablets (1,000 mg total) by mouth 3 (three) times daily.    amLODIPine (NORVASC) 10 MG tablet Take 10 mg by mouth once daily.    ascorbic acid, vitamin C, (VITAMIN C) 500 MG tablet Take 1 tablet by mouth every morning.    aspirin (ECOTRIN) 81 MG EC tablet Take 1 tablet (81  mg total) by mouth once daily.    atorvastatin (LIPITOR) 80 MG tablet Take 80 mg by mouth once daily.    carvediloL (COREG) 3.125 MG tablet Take 3.125 mg by mouth 2 (two) times a day.    cholecalciferol, vitamin D3, 125 mcg (5,000 unit) Tab Take 5,000 Units by mouth every Monday.    gabapentin (NEURONTIN) 300 MG capsule Take 300 mg by mouth 3 (three) times daily.    HUMULIN R REGULAR U-100 INSULN 100 unit/mL injection Inject  into the skin 2 (two) times daily before meals. If blood sugar 200-250= 4 units, 251-300= 6 units, 301-350= 8 units, 351-400= 10 units, 401- 450= 12 units, >450= 14 units and call MD.    insulin detemir U-100 (LEVEMIR FLEXTOUCH) 100 unit/mL (3 mL) SubQ InPn pen Inject 12 Units into the skin 2 (two) times daily.    levothyroxine (SYNTHROID) 125 MCG tablet Take 125 mcg by mouth once daily.    meloxicam (MOBIC) 7.5 MG tablet Take 7.5 mg by mouth once daily.    multivitamin Tab Take 1 tablet by mouth once daily.    mycophenolate (CELLCEPT) 250 mg Cap Take 250 mg by mouth 2 (two) times daily.    nitroGLYCERIN (NITROSTAT) 0.4 MG SL tablet Place 1 tablet (0.4 mg total) under the tongue every 5 (five) minutes as needed.    ondansetron (ZOFRAN) 4 MG tablet Take 4 mg by mouth every 8 (eight) hours as needed for Nausea.    semaglutide (OZEMPIC) 1 mg/dose (2 mg/1.5 mL) PnIj Inject 1 mg under the skin every 7 days.    sertraline (ZOLOFT) 25 MG tablet Take 25 mg by mouth once daily.    tacrolimus (PROGRAF) 0.5 MG Cap Take 2 capsules (1 mg total) by mouth every 12 (twelve) hours.    traMADoL (ULTRAM) 50 mg tablet Take 50 mg by mouth every 6 (six) hours as needed.    [DISCONTINUED] cloNIDine (CATAPRES) 0.1 MG tablet Take 1 tablet (0.1 mg total) by mouth 3 (three) times daily. (Patient not taking: Reported on 8/4/2022)    [DISCONTINUED] furosemide (LASIX) 40 MG tablet Take 1 tablet (40 mg total) by mouth daily as needed (Leg swelling, Nocturnal SOB).    [DISCONTINUED] ipratropium (ATROVENT)  0.02 % nebulizer solution Take 2.5 mLs (500 mcg total) by nebulization 4 (four) times daily. (Patient not taking: Reported on 8/4/2022)    [DISCONTINUED] isosorbide mononitrate (IMDUR) 30 MG 24 hr tablet Take 2 tablets (60 mg total) by mouth once daily. (Patient not taking: Reported on 8/4/2022)    [DISCONTINUED] ketoconazole (NIZORAL) 2 % cream Apply topically 2 (two) times daily.    [DISCONTINUED] levalbuterol (XOPENEX) 1.25 mg/3 mL nebulizer solution Take 3 mLs (1.25 mg total) by nebulization every 6 to 8 hours as needed for Wheezing or Shortness of Breath.    [DISCONTINUED] metoprolol tartrate (LOPRESSOR) 25 MG tablet Take 1 tablet (25 mg total) by mouth 2 (two) times daily.     Family History       Problem Relation (Age of Onset)    Cancer Father    Diabetes Father    Heart failure Father, Mother    Stroke Father          Tobacco Use    Smoking status: Former     Packs/day: 1.00     Years: 40.00     Pack years: 40.00     Types: Cigarettes     Quit date: 1/11/2013     Years since quitting: 10.2    Smokeless tobacco: Never   Substance and Sexual Activity    Alcohol use: Yes     Alcohol/week: 6.0 standard drinks     Types: 6 Cans of beer per week     Comment: seldom    Drug use: No    Sexual activity: Never     Review of Systems   Constitutional:  Positive for fatigue. Negative for fever.   HENT:  Negative for trouble swallowing.    Eyes: Negative.    Respiratory:  Negative for shortness of breath.    Cardiovascular:  Negative for chest pain.   Gastrointestinal:  Positive for diarrhea. Negative for vomiting.   Musculoskeletal:  Positive for back pain and myalgias.   Skin: Negative.    Neurological:  Negative for syncope and headaches.   Psychiatric/Behavioral:  The patient is nervous/anxious.    Objective:     Vital Signs (Most Recent):  Temp: 98.9 °F (37.2 °C) (04/08/23 1105)  Pulse: 74 (04/08/23 1100)  Resp: (!) 22 (04/08/23 1513)  BP: (!) 120/59 (04/08/23 1100)  SpO2: 95 % (04/08/23 1100)   Vital Signs  (24h Range):  Temp:  [97.2 °F (36.2 °C)-98.9 °F (37.2 °C)] 98.9 °F (37.2 °C)  Pulse:  [71-80] 74  Resp:  [10-34] 22  SpO2:  [90 %-97 %] 95 %  BP: ()/(49-68) 120/59     Weight: 84.6 kg (186 lb 6.4 oz)  Body mass index is 26 kg/m².    Physical Exam  Vitals reviewed.   Constitutional:       General: He is awake. He is not in acute distress.     Comments: Chronically ill appearing male lying in bed on RA in NAD   Cardiovascular:      Rate and Rhythm: Normal rate.      Pulses:           Radial pulses are 2+ on the right side and 2+ on the left side.   Pulmonary:      Effort: Pulmonary effort is normal.      Breath sounds: Normal breath sounds.      Comments: On RA  Abdominal:      General: There is no distension.      Palpations: Abdomen is soft.   Musculoskeletal:      Right lower leg: No edema.      Left lower leg: No edema.      Comments: BLE amputations   Skin:     General: Skin is warm and dry.   Neurological:      General: No focal deficit present.      Mental Status: He is alert.   Psychiatric:         Behavior: Behavior is cooperative.       Significant Labs: All pertinent labs within the past 24 hours have been reviewed.      Significant Imaging: I have reviewed all pertinent imaging results/findings within the past 24 hours.      Assessment/Plan:     * Septic shock  S/p vasopressor  Etiology = bacteremia , wound infection ,C,diff and osteo   Cont IVAB   ID on board       Bacteremia  Microbiology Results (last 7 days)     Procedure Component Value Units Date/Time    Culture, Anaerobe [[id number]] Collected: 04/06/23 1707    Order Status: Completed Specimen: Wound from Leg, Right Updated: 04/08/23 1357     Anaerobic Culture Culture in progress    Narrative:      Right stump wound    Blood culture x two cultures. Draw prior to antibiotics. [[id number]]  (Abnormal)  (Susceptibility) Collected: 04/05/23 1757    Order Status: Completed Specimen: Blood from Peripheral, Antecubital, Right Updated: 04/08/23 1019      Blood Culture, Routine Gram stain susan bottle: Gram negative rods, Gram positive cocci      Results called to and read back by: Ashley Nails RN 04/06/2023  14:48      Gram stain aer bottle: Gram positive cocci and Gram negative rods      Positive results previously called 04/06/2023      STREPTOCOCCUS AGALACTIAE (GROUP B)  Susceptibility pending  Beta-hemolytic streptococci are routinely susceptible to   penicillins,cephalosporins and carbapenems.        PROTEUS MIRABILIS    Narrative:      Aerobic and anaerobic    Blood culture x two cultures. Draw prior to antibiotics. [[id number]]  (Abnormal) Collected: 04/05/23 1945    Order Status: Completed Specimen: Blood from Peripheral, Wrist, Right Updated: 04/08/23 1019     Blood Culture, Routine Gram stain susan bottle: Gram negative rods      Results called to and read back by: Ashley Nails RN 04/06/2023 14:48      Gram stain aer bottle: Gram negative rods      Positive results previously called 04/07/2023  20:26      PROTEUS MIRABILIS  For susceptibility see order #[id number]      Narrative:      Aerobic and anaerobic    Aerobic culture [[id number]]  (Abnormal) Collected: 04/06/23 1707    Order Status: Completed Specimen: Wound from Leg, Right Updated: 04/08/23 0738     Aerobic Bacterial Culture GRAM NEGATIVE MIGUEL ANGEL  Moderate  Identification and susceptibility pending  Skin skylar also present      Narrative:      Right stump wound    Urine culture [[id number]] Collected: 04/06/23 0940    Order Status: Completed Specimen: Urine Updated: 04/1953     Urine Culture, Routine No growth    Narrative:      Specimen Source->Urine    C Diff Toxin by PCR [[id number]]  (Abnormal) Collected: 04/06/23 1145    Order Status: Completed Updated: 04/07/23 0520     C. diff PCR Positive    Clostridium difficile EIA [[id number]]  (Abnormal) Collected: 04/06/23 1145    Order Status: Completed Specimen: Stool Updated: 04/06/23 1621     C. diff Antigen Positive     C difficile Toxins A+B, EIA  Negative     Comment: Testing not recommended for children <24 months old.       Rapid Organism ID by PCR (from Blood culture) [[id number]]  (Abnormal) Collected: 04/05/23 0366    Order Status: Completed Updated: 04/06/23 1525     Enterococcus faecalis Detected     Enterococcus faecium Not Detected     Listeria Monocytogenes Not Detected     Staphylococcus spp. Not Detected     Staphylococcus aureus Not Detected     Staphylococcus epidermidis Not Detected     Staphylococcus lugdunensis Not Detected     Streptococcus species See species for ID     Streptococcus agalactiae Detected     Streptococcus pneumoniae Not Detected     Streptococcus pyogenes Not Detected     Acinetobacter calcoaceticus/baumannii complex Not Detected     Bacteroides fragilis Not Detected     Enterobacerales See species for ID     Enterobacter cloacae complex Not Detected     Escherichia Not Detected     Klebsiella aerogenes Not Detected     Klebsiella oxytoca Not Detected     Klebsiella pneumoniae group Not Detected     Proteus Detected     Salmonella sp Not Detected     Serratia marcescens Not Detected     Haemophilus influenzae Not Detected     Neisseria meningtidis Not Detected     Pseudomonas aeruginosa Not Detected     Stenotrophomonas maltophilia Not Detected     Candida albicans Not Detected     Candida auris Not Detected     Candida glabrata Not Detected     Candida krusei Not Detected     Candida parapsilosis Not Detected     Candida tropicalis Not Detected     Cryptococcus neoformans/gattii Not Detected     CTX-M (ESBL ) Not Detected     IMP (Carbapenem resistant) Not Detected     KPC resistance gene (Carbapenem resistant) Not Detected     mcr-1  Test Not Applicable     mec A/C  Test Not Applicable     mec A/C and MREJ (MRSA) gene Test Not Applicable     NDM (Carbapenem resistant) Not Detected     OXA-48-like (Carbapenem resistant) Not Detected     van A/B (VRE gene) Not Detected     VIM (Carbapenem resistant) Not Detected     Narrative:      Aerobic and anaerobic    Culture, Anaerobe [[id number]] Collected: 23 224    Order Status: Sent Specimen: Wound from Leg, Right Updated: 23          ID on board  Cont IVAB     Clostridium difficile infection  Cont isolation   Cont po vanc       Osteomyelitis  Cont IVAB  F/U inflammatory markers  Plan to do Right stump MRI   ID on board       Immunosuppression  Cont current tx       Type 2 diabetes mellitus with hyperglycemia, with long-term current use of insulin  Patient's FSGs are controlled on current medication regimen.  Last A1c reviewed-   Lab Results   Component Value Date    HGBA1C 6.7 (H) 2023     Most recent fingerstick glucose reviewed-   Recent Labs   Lab 23  1727 23  2141 23  0803 23  1118   POCTGLUCOSE 184* 171* 106 228*     Current correctional scale  Medium  Maintain anti-hyperglycemic dose as follows-   Antihyperglycemics (From admission, onward)    Start     Stop Route Frequency Ordered    23 0823  insulin aspart U-100 pen 1-10 Units         -- SubQ Before meals & nightly PRN 23 0724        Hold Oral hypoglycemics while patient is in the hospital.     donor kidney transplant for DM 16, DINORAH on CKD  Nephrology on board          VTE Risk Mitigation (From admission, onward)         Ordered     heparin (porcine) injection 5,000 Units  Every 8 hours         23 2207                Critical care time spent on the evaluation and treatment of severe organ dysfunction, review of pertinent labs and imaging studies, discussions with consulting providers and discussions with patient/family: 45 minutes.    Thank you for your consult. I will follow-up with patient. Please contact us if you have any additional questions.    Tahir Carmichael MD  Department of Hospital Medicine   [facility] ([facility])

## 2023-04-08 NOTE — ASSESSMENT & PLAN NOTE
- admitted 4/5, tested 4/6 with + PCR  - initiated on PO vanc 4/6, continue for 10 days  - isolation precautions

## 2023-04-08 NOTE — SUBJECTIVE & OBJECTIVE
Past Medical History:   Diagnosis Date    DINORAH (acute kidney injury) 2016    Arthritis     CAD in native artery 2019    CHF (congestive heart failure)     Chronic obstructive pulmonary disease 2016    Coronary artery disease involving native coronary artery of native heart without angina pectoris 2016    CRI (chronic renal insufficiency) 2019     donor kidney transplant for DM 16     Induction with Thymo x3 and IV solumedrol to total 875mg  Kidney Biopsy  2016: 16 glomeruli, ACR type 1 AVR type 2, significant microcirculatory changes, c4d negative, No DSA, 5 to10% fibrosis. Treated with thymo x8 2016- no rejection      Diastolic heart failure     Encounter for blood transfusion     ESRD on RRT since 10/2013 10/29/2013    Biopsy proven diabetic nephropathy and lymphoplasmacytic interstitial infiltrate not c/w with AIN (ddx sjogrens or assoc with tamm-horsefall protein extravasation)     GERD (gastroesophageal reflux disease)     History of hepatitis C, s/p successful Rx w/ SVR12 - 2017    Completed 12 weeks harvoni w/ SVR    Hyperlipidemia     Hypertension     PAD (peripheral artery disease) 2019    PIC line (peripherally inserted central catheter) flush     Prophylactic immunotherapy     Proteinuria     PVD (peripheral vascular disease) 2017    RLE BKA CT 16 Extensive atherosclerotic disease of the aorta and mesenteric arteries.     Renal hypertension     Type 2 diabetes mellitus with diabetic neuropathy, with long-term current use of insulin 2016    Vitamin B12 deficiency        Past Surgical History:   Procedure Laterality Date    ANGIOGRAPHY OF LOWER EXTREMITY N/A 2022    Procedure: ANGIOGRAM, LOWER EXTREMITY/left leg;  Surgeon: Donal Mcdonald MD;  Location: [facility] [place] CATH LAB;  Service: Cardiovascular;  Laterality: N/A;    ANGIOGRAPHY OF LOWER EXTREMITY N/A 2022    Procedure: ANGIOGRAM, LOWER EXTREMITY/left leg;  Surgeon: Donal  MD Tamara;  Location: [facility] CATH LAB;  Service: Peripheral Vascular;  Laterality: N/A;  resched from 9/23 pt ready now    AORTOGRAPHY WITH SERIALOGRAPHY N/A 6/14/2018    Procedure: LEFT LEG ANGIOGRAM;  Surgeon: Donal Mcdonald MD;  Location: [facility] CATH LAB;  Service: Vascular;  Laterality: N/A;    APPLICATION OF LARGE EXTERNAL FIXATION DEVICE TO TIBIA Left 8/4/2022    Procedure: APPLICATION, EXTERNAL FIXATION DEVICE, LARGE, TIBIA;  Surgeon: Good Villa MD;  Location: [facility];  Service: Orthopedics;  Laterality: Left;    av bovine graft      Left UE    AV FISTULA PLACEMENT      left UE    BELOW KNEE AMPUTATION OF LOWER EXTREMITY Left 10/6/2022    Procedure: AMPUTATION, BELOW KNEE;  Surgeon: Donal Mcdonald MD;  Location: [facility];  Service: Vascular;  Laterality: Left;    CARDIAC CATHETERIZATION  02/2015    CLOSED REDUCTION OF INJURY OF TIBIA Left 8/4/2022    Procedure: CLOSED REDUCTION, TIBIA;  Surgeon: Good Villa MD;  Location: [facility];  Service: Orthopedics;  Laterality: Left;  Closed reduction left tibial and fibular shaft fractures    CLOSURE OF WOUND Left 9/24/2018    Procedure: CLOSURE, WOUND;  Surgeon: Karla Wheeler DPM;  Location: [facility] OR;  Service: Podiatry;  Laterality: Left;  Secondary Wound closure, extensive    CLOSURE OF WOUND Left 11/5/2018    Procedure: CLOSURE, WOUND;  Surgeon: Karla Wheeler DPM;  Location: [facility];  Service: Podiatry;  Laterality: Left;    DEBRIDEMENT OF MULTIPLE METATARSAL BONES Left 11/5/2018    Procedure: DEBRIDEMENT, METATARSAL BONE, 2 OR MORE BONES;  Surgeon: Karla Wheeler DPM;  Location: [facility];  Service: Podiatry;  Laterality: Left;    EXCISION OF SKIN Left 9/27/2019    Procedure: EXCISION, SKIN;  Surgeon: Lenard Alarcon MD;  Location: [address];  Service: Plastics;  Laterality: Left;  Plastics set, NIMS monitor, ACell    FOOT AMPUTATION THROUGH METATARSAL Left 9/21/2018    Procedure: AMPUTATION, FOOT, TRANSMETATARSAL;  Surgeon: Karla Wheeler  LASHONDA;  Location: [facility] OR;  Service: Podiatry;  Laterality: Left;    FOOT AMPUTATION THROUGH METATARSAL Left 10/31/2018    Procedure: AMPUTATION, FOOT, TRANSMETATARSAL;  Surgeon: Karla Wheeler DPM;  Location: [facility] OR;  Service: Podiatry;  Laterality: Left;    FOOT AMPUTATION THROUGH METATARSAL Left 11/5/2018    Procedure: AMPUTATION, FOOT, TRANSMETATARSAL;  Surgeon: Karla Wheeler DPM;  Location: [facility] OR;  Service: Podiatry;  Laterality: Left;  revisional transmetatarsal amputation, Left foot    IRRIGATION AND DEBRIDEMENT OF LOWER EXTREMITY Left 10/31/2018    Procedure: IRRIGATION AND DEBRIDEMENT, LOWER EXTREMITY;  Surgeon: Karla Wheeler DPM;  Location: [facility] OR;  Service: Podiatry;  Laterality: Left;    KIDNEY TRANSPLANT  05/21/2016    LEFT HEART CATHETERIZATION Left 7/21/2019    Procedure: CATHETERIZATION, HEART, LEFT;  Surgeon: Andrew Valdez MD;  Location: [facility] CATH LAB;  Service: Cardiology;  Laterality: Left;    LEG AMPUTATION THROUGH KNEE  2011    right LE, started as nail puncture leading to diabetic ulcer    REMOVAL OF EXTERNAL FIXATION DEVICE Left 9/17/2022    Procedure: REMOVAL, EXTERNAL FIXATION DEVICE;  Surgeon: Kathleen Zazueta MD;  Location: [facility] OR;  Service: Orthopedics;  Laterality: Left;    SKIN FULL THICKNESS GRAFT Left 10/7/2019    Procedure: APPLICATION, GRAFT, SKIN, FULL-THICKNESS;  Surgeon: Lenard Alarcon MD;  Location: [address];  Service: Plastics;  Laterality: Left;    SURGICAL REMOVAL OF LESION OF FACE Right 10/7/2019    Procedure: EXCISION, LESION, FACE;  Surgeon: Lenard Alarcon MD;  Location: [address];  Service: Plastics;  Laterality: Right;       Review of patient's allergies indicates:  No Known Allergies    No current facility-administered medications on file prior to encounter.     Current Outpatient Medications on File Prior to Encounter   Medication Sig    acetaminophen (TYLENOL) 500 MG tablet Take 2 tablets (1,000 mg total) by mouth 3 (three) times  daily.    amLODIPine (NORVASC) 10 MG tablet Take 10 mg by mouth once daily.    ascorbic acid, vitamin C, (VITAMIN C) 500 MG tablet Take 1 tablet by mouth every morning.    aspirin (ECOTRIN) 81 MG EC tablet Take 1 tablet (81 mg total) by mouth once daily.    atorvastatin (LIPITOR) 80 MG tablet Take 80 mg by mouth once daily.    carvediloL (COREG) 3.125 MG tablet Take 3.125 mg by mouth 2 (two) times a day.    cholecalciferol, vitamin D3, 125 mcg (5,000 unit) Tab Take 5,000 Units by mouth every Monday.    gabapentin (NEURONTIN) 300 MG capsule Take 300 mg by mouth 3 (three) times daily.    HUMULIN R REGULAR U-100 INSULN 100 unit/mL injection Inject  into the skin 2 (two) times daily before meals. If blood sugar 200-250= 4 units, 251-300= 6 units, 301-350= 8 units, 351-400= 10 units, 401- 450= 12 units, >450= 14 units and call MD.    insulin detemir U-100 (LEVEMIR FLEXTOUCH) 100 unit/mL (3 mL) SubQ InPn pen Inject 12 Units into the skin 2 (two) times daily.    levothyroxine (SYNTHROID) 125 MCG tablet Take 125 mcg by mouth once daily.    meloxicam (MOBIC) 7.5 MG tablet Take 7.5 mg by mouth once daily.    multivitamin Tab Take 1 tablet by mouth once daily.    mycophenolate (CELLCEPT) 250 mg Cap Take 250 mg by mouth 2 (two) times daily.    nitroGLYCERIN (NITROSTAT) 0.4 MG SL tablet Place 1 tablet (0.4 mg total) under the tongue every 5 (five) minutes as needed.    ondansetron (ZOFRAN) 4 MG tablet Take 4 mg by mouth every 8 (eight) hours as needed for Nausea.    semaglutide (OZEMPIC) 1 mg/dose (2 mg/1.5 mL) PnIj Inject 1 mg under the skin every 7 days.    sertraline (ZOLOFT) 25 MG tablet Take 25 mg by mouth once daily.    tacrolimus (PROGRAF) 0.5 MG Cap Take 2 capsules (1 mg total) by mouth every 12 (twelve) hours.    traMADoL (ULTRAM) 50 mg tablet Take 50 mg by mouth every 6 (six) hours as needed.    [DISCONTINUED] cloNIDine (CATAPRES) 0.1 MG tablet Take 1 tablet (0.1 mg total) by mouth 3 (three) times daily. (Patient not  taking: Reported on 8/4/2022)    [DISCONTINUED] furosemide (LASIX) 40 MG tablet Take 1 tablet (40 mg total) by mouth daily as needed (Leg swelling, Nocturnal SOB).    [DISCONTINUED] ipratropium (ATROVENT) 0.02 % nebulizer solution Take 2.5 mLs (500 mcg total) by nebulization 4 (four) times daily. (Patient not taking: Reported on 8/4/2022)    [DISCONTINUED] isosorbide mononitrate (IMDUR) 30 MG 24 hr tablet Take 2 tablets (60 mg total) by mouth once daily. (Patient not taking: Reported on 8/4/2022)    [DISCONTINUED] ketoconazole (NIZORAL) 2 % cream Apply topically 2 (two) times daily.    [DISCONTINUED] levalbuterol (XOPENEX) 1.25 mg/3 mL nebulizer solution Take 3 mLs (1.25 mg total) by nebulization every 6 to 8 hours as needed for Wheezing or Shortness of Breath.    [DISCONTINUED] metoprolol tartrate (LOPRESSOR) 25 MG tablet Take 1 tablet (25 mg total) by mouth 2 (two) times daily.     Family History       Problem Relation (Age of Onset)    Cancer Father    Diabetes Father    Heart failure Father, Mother    Stroke Father          Tobacco Use    Smoking status: Former     Packs/day: 1.00     Years: 40.00     Pack years: 40.00     Types: Cigarettes     Quit date: 1/11/2013     Years since quitting: 10.2    Smokeless tobacco: Never   Substance and Sexual Activity    Alcohol use: Yes     Alcohol/week: 6.0 standard drinks     Types: 6 Cans of beer per week     Comment: seldom    Drug use: No    Sexual activity: Never     Review of Systems   Constitutional:  Positive for fatigue. Negative for fever.   HENT:  Negative for trouble swallowing.    Eyes: Negative.    Respiratory:  Negative for shortness of breath.    Cardiovascular:  Negative for chest pain.   Gastrointestinal:  Positive for diarrhea. Negative for vomiting.   Musculoskeletal:  Positive for back pain and myalgias.   Skin: Negative.    Neurological:  Negative for syncope and headaches.   Psychiatric/Behavioral:  The patient is nervous/anxious.    Objective:      Vital Signs (Most Recent):  Temp: 98.9 °F (37.2 °C) (04/08/23 1105)  Pulse: 74 (04/08/23 1100)  Resp: (!) 22 (04/08/23 1513)  BP: (!) 120/59 (04/08/23 1100)  SpO2: 95 % (04/08/23 1100)   Vital Signs (24h Range):  Temp:  [97.2 °F (36.2 °C)-98.9 °F (37.2 °C)] 98.9 °F (37.2 °C)  Pulse:  [71-80] 74  Resp:  [10-34] 22  SpO2:  [90 %-97 %] 95 %  BP: ()/(49-68) 120/59     Weight: 84.6 kg (186 lb 6.4 oz)  Body mass index is 26 kg/m².    Physical Exam  Vitals reviewed.   Constitutional:       General: He is awake. He is not in acute distress.     Comments: Chronically ill appearing male lying in bed on RA in [facility]   Cardiovascular:      Rate and Rhythm: Normal rate.      Pulses:           Radial pulses are 2+ on the right side and 2+ on the left side.   Pulmonary:      Effort: Pulmonary effort is normal.      Breath sounds: Normal breath sounds.      Comments: On RA  Abdominal:      General: There is no distension.      Palpations: Abdomen is soft.   Musculoskeletal:      Right lower leg: No edema.      Left lower leg: No edema.      Comments: BLE amputations   Skin:     General: Skin is warm and dry.   Neurological:      General: No focal deficit present.      Mental Status: He is alert.   Psychiatric:         Behavior: Behavior is cooperative.       Significant Labs: All pertinent labs within the past 24 hours have been reviewed.      Significant Imaging: I have reviewed all pertinent imaging results/findings within the past 24 hours.

## 2023-04-08 NOTE — SUBJECTIVE & OBJECTIVE
ROS complete and negative unless stated in the interval HPI    Objective:     Vital Signs (Most Recent):  Temp: 98.3 °F (36.8 °C) (04/08/23 0300)  Pulse: 79 (04/08/23 0721)  Resp: 16 (04/08/23 0721)  BP: 129/62 (04/08/23 0600)  SpO2: (!) 94 % (04/08/23 0721)   Vital Signs (24h Range):  Temp:  [97.8 °F (36.6 °C)-99 °F (37.2 °C)] 98.3 °F (36.8 °C)  Pulse:  [71-95] 79  Resp:  [10-34] 16  SpO2:  [90 %-99 %] 94 %  BP: ()/(39-74) 129/62     Weight: 84.6 kg (186 lb 6.4 oz)  Body mass index is 26 kg/m².      Intake/Output Summary (Last 24 hours) at 4/8/2023 0806  Last data filed at 4/8/2023 0500  Gross per 24 hour   Intake 1699.72 ml   Output 3990 ml   Net -2290.28 ml       Physical Exam  Vitals reviewed.   Constitutional:       General: He is awake. He is not in acute distress.     Comments: Chronically ill appearing male lying in bed on RA in [facility]   Cardiovascular:      Rate and Rhythm: Normal rate.      Pulses:           Radial pulses are 2+ on the right side and 2+ on the left side.   Pulmonary:      Effort: Pulmonary effort is normal.      Breath sounds: Normal breath sounds.      Comments: On RA  Abdominal:      General: There is no distension.      Palpations: Abdomen is soft.   Musculoskeletal:      Right lower leg: No edema.      Left lower leg: No edema.      Comments: BLE amputations   Skin:     General: Skin is warm and dry.   Neurological:      General: No focal deficit present.      Mental Status: He is alert.   Psychiatric:         Behavior: Behavior is cooperative.          Lines/Drains/Airways       Central Venous Catheter Line  Duration             Percutaneous Central Line Insertion/Assessment - Triple Lumen  04/05/23 2140 Internal Jugular Right 2 days              Drain  Duration                  Hemodialysis AV Fistula 09/14/22 0717 Left upper arm 206 days         Rectal Tube 04/07/23 1415 <1 day                    Significant Labs:    CBC/Anemia Profile:  Recent Labs   Lab 04/07/23  0438  04/08/23  0447   WBC 10.23 6.05   HGB 8.4* 8.4*   HCT 27.1* 27.8*    209   MCV 81* 83   RDW 14.9* 15.1*        Chemistries:  Recent Labs   Lab 04/06/23  1123 04/07/23  0435 04/08/23 0447   NA  --  130* 135*   K  --  3.9 4.1   CL  --  101 107   CO2  --  18* 20*   BUN  --  36* 34*   CREATININE  --  2.3* 2.1*   CALCIUM  --  8.2* 8.5*   MG  --  2.2 2.1   PHOS 3.9  --   --        All pertinent labs within the past 24 hours have been reviewed.    Significant Imaging:  I have reviewed all pertinent imaging results/findings within the past 24 hours.

## 2023-04-08 NOTE — HPI
67 year old male with  history of  renal transplant on cellcept and prograf; HTN; DM2; sergey lower extremity amputee admitted -04/05 with with intractable vomiting. He was  at [facility] and presented with  foul smelling drainage from the stump wound.  Labs and imaging test reviewed-  Blood cultures- strep and proteus  Wound cultures- proteus.BACTEROIDES   X ray concerning with osseous and soft tissue changes suspicious for osteo  C difficle assay is positive   He was admitted in the ICU with septic shock .  Component      Latest Ref Rng & Units 4/7/2023 4/6/2023              WBC      3.90 - 12.70 K/uL 10.23 18.84 (H)

## 2023-04-08 NOTE — CONSULTS
Pharmacokinetic Assessment Follow Up: IV Vancomycin    Vancomycin serum concentration assessment(s):    The random level was drawn correctly and can be used to guide therapy at this time. The measurement is within the desired definitive target range of 15 to 20 mcg/mL.    Vancomycin Regimen Plan:    Continue pulse dosing regimen with Vancomycin 1250 mg IV once today. The next serum random concentration will be measured at 1430 on 4/9.    Drug levels (last 3 results):  Recent Labs   Lab Result Units 04/06/23  1442 04/07/23  1044 04/08/23  0708   Vancomycin, Random ug/mL 7.0 14.9 19.7       Pharmacy will continue to follow and monitor vancomycin.    Please contact pharmacy at extension [phone]   for questions regarding this assessment.    Patient brief summary:  Lavelle Ladd is a 67 y.o. male initiated on antimicrobial therapy with IV Vancomycin for treatment of sepsis    The patient's current regimen is pulse dosing, he will receive 1250 mg IV once.     Drug Allergies:   Review of patient's allergies indicates:  No Known Allergies    Actual Body Weight:   84.6 kg    Renal Function:   Estimated Creatinine Clearance: 35.4 mL/min (A) (based on SCr of 2.1 mg/dL (H)).,     Dialysis Method (if applicable):  N/A    CBC (last 72 hours):  Recent Labs   Lab Result Units 04/05/23  1758 04/05/23  2303 04/06/23  0412 04/07/23  0435 04/08/23  0447   WBC K/uL 10.57  --  18.84* 10.23 6.05   Hemoglobin g/dL 10.0*  --  8.5* 8.4* 8.4*   Hemoglobin A1C %  --  6.7*  --   --   --    Hematocrit % 32.1*  --  27.8* 27.1* 27.8*   Platelets K/uL 271  --  250 233 209   Gran % % 88.9*  --  69.0 70.7 63.6   Lymph % % 7.9*  --  3.0* 16.4* 17.9*   Mono % % 2.2*  --  1.0* 11.0 12.9   Eosinophil % % 0.2  --  0.0 1.0 4.8   Basophil % % 0.3  --  0.0 0.2 0.3   Differential Method  Automated  --  Manual Automated Automated       Metabolic Panel (last 72 hours):  Recent Labs   Lab Result Units 04/05/23  1758 04/06/23  0412 04/06/23  0940  04/06/23  1123 04/07/23  0435 04/08/23  0447   Sodium mmol/L 131* 129*  --   --  130* 135*   Potassium mmol/L 4.5 5.0  --   --  3.9 4.1   Chloride mmol/L 102 100  --   --  101 107   CO2 mmol/L 16* 19*  --   --  18* 20*   Glucose mg/dL 92 103  --   --  86 113*   Glucose, UA   --   --  Negative  --   --   --    BUN mg/dL 34* 35*  --   --  36* 34*   Creatinine mg/dL 2.2* 2.4*  --   --  2.3* 2.1*   Creatinine, Urine mg/dL  --   --  62.6  --   --   --    Albumin g/dL 2.6*  --   --   --   --   --    Total Bilirubin mg/dL 0.4  --   --   --   --   --    Alkaline Phosphatase U/L 164*  --   --   --   --   --    AST U/L 46*  --   --   --   --   --    ALT U/L 31  --   --   --   --   --    Magnesium mg/dL  --  1.2*  --   --  2.2 2.1   Phosphorus mg/dL  --  3.4  --  3.9  --   --        Vancomycin Administrations:  vancomycin given in the last 96 hours                     vancomycin 1,250 mg in dextrose 5 % (D5W) 250 mL IVPB (Vial-Mate) (mg) 1,250 mg New Bag 04/08/23 1428    vancomycin SolR 125 mg (mg) 125 mg Given 04/08/23 1116     125 mg Given  0633     125 mg Given  0021     125 mg Given 04/07/23 1729     125 mg Given  1259     125 mg Given  0611     125 mg Given 04/06/23 2350     125 mg Given  1744    vancomycin 1,250 mg in dextrose 5 % (D5W) 250 mL IVPB (Vial-Mate) (mg) 1,250 mg New Bag 04/07/23 1305    vancomycin 1,250 mg in dextrose 5 % (D5W) 250 mL IVPB (Vial-Mate) (mg) 1,250 mg New Bag 04/06/23 1604    vancomycin 1.75 g in 5 % dextrose 500 mL IVPB (mg) 1,750 mg New Bag 04/05/23 1949                    Microbiologic Results:  Microbiology Results (last 7 days)       Procedure Component Value Units Date/Time    Culture, Anaerobe [[id number]] Collected: 04/06/23 1707    Order Status: Completed Specimen: Wound from Leg, Right Updated: 04/08/23 1357     Anaerobic Culture Culture in progress    Narrative:      Right stump wound    Blood culture x two cultures. Draw prior to antibiotics. [[id number]]  (Abnormal)  (Susceptibility)  Collected: 04/05/23 1757    Order Status: Completed Specimen: Blood from Peripheral, Antecubital, Right Updated: 04/08/23 1019     Blood Culture, Routine Gram stain susan bottle: Gram negative rods, Gram positive cocci      Results called to and read back by: Ashley Nails RN 04/06/2023  14:48      Gram stain aer bottle: Gram positive cocci and Gram negative rods      Positive results previously called 04/06/2023      STREPTOCOCCUS AGALACTIAE (GROUP B)  Susceptibility pending  Beta-hemolytic streptococci are routinely susceptible to   penicillins,cephalosporins and carbapenems.        PROTEUS MIRABILIS    Narrative:      Aerobic and anaerobic    Blood culture x two cultures. Draw prior to antibiotics. [[id number]]  (Abnormal) Collected: 04/05/23 1945    Order Status: Completed Specimen: Blood from Peripheral, Wrist, Right Updated: 04/08/23 1019     Blood Culture, Routine Gram stain susan bottle: Gram negative rods      Results called to and read back by: Ashley Nails RN 04/06/2023 14:48      Gram stain aer bottle: Gram negative rods      Positive results previously called 04/07/2023  20:26      PROTEUS MIRABILIS  For susceptibility see order #[id number]      Narrative:      Aerobic and anaerobic    Aerobic culture [[id number]]  (Abnormal) Collected: 04/06/23 1707    Order Status: Completed Specimen: Wound from Leg, Right Updated: 04/08/23 0738     Aerobic Bacterial Culture GRAM NEGATIVE MIGUEL ANGEL  Moderate  Identification and susceptibility pending  Skin skylar also present      Narrative:      Right stump wound    Urine culture [[id number]] Collected: 04/06/23 0940    Order Status: Completed Specimen: Urine Updated: 04/1953     Urine Culture, Routine No growth    Narrative:      Specimen Source->Urine    C Diff Toxin by PCR [[id number]]  (Abnormal) Collected: 04/06/23 1145    Order Status: Completed Updated: 04/07/23 0520     C. diff PCR Positive    Clostridium difficile EIA [[id number]]  (Abnormal) Collected: 04/06/23 1145     Order Status: Completed Specimen: Stool Updated: 04/06/23 1621     C. diff Antigen Positive     C difficile Toxins A+B, EIA Negative     Comment: Testing not recommended for children <24 months old.       Rapid Organism ID by PCR (from Blood culture) [[id number]]  (Abnormal) Collected: 04/05/23 5480    Order Status: Completed Updated: 04/06/23 1524     Enterococcus faecalis Detected     Enterococcus faecium Not Detected     Listeria Monocytogenes Not Detected     Staphylococcus spp. Not Detected     Staphylococcus aureus Not Detected     Staphylococcus epidermidis Not Detected     Staphylococcus lugdunensis Not Detected     Streptococcus species See species for ID     Streptococcus agalactiae Detected     Streptococcus pneumoniae Not Detected     Streptococcus pyogenes Not Detected     Acinetobacter calcoaceticus/baumannii complex Not Detected     Bacteroides fragilis Not Detected     Enterobacerales See species for ID     Enterobacter cloacae complex Not Detected     Escherichia Not Detected     Klebsiella aerogenes Not Detected     Klebsiella oxytoca Not Detected     Klebsiella pneumoniae group Not Detected     Proteus Detected     Salmonella sp Not Detected     Serratia marcescens Not Detected     Haemophilus influenzae Not Detected     Neisseria meningtidis Not Detected     Pseudomonas aeruginosa Not Detected     Stenotrophomonas maltophilia Not Detected     Candida albicans Not Detected     Candida auris Not Detected     Candida glabrata Not Detected     Candida krusei Not Detected     Candida parapsilosis Not Detected     Candida tropicalis Not Detected     Cryptococcus neoformans/gattii Not Detected     CTX-M (ESBL ) Not Detected     IMP (Carbapenem resistant) Not Detected     KPC resistance gene (Carbapenem resistant) Not Detected     mcr-1  Test Not Applicable     mec A/C  Test Not Applicable     mec A/C and MREJ (MRSA) gene Test Not Applicable     NDM (Carbapenem resistant) Not Detected      OXA-48-like (Carbapenem resistant) Not Detected     van A/B (VRE gene) Not Detected     VIM (Carbapenem resistant) Not Detected    Narrative:      Aerobic and anaerobic    Culture, Anaerobe [[id number]] Collected: 04/05/23 1068    Order Status: Sent Specimen: Wound from Leg, Right Updated: 04/06/23 4798

## 2023-04-09 NOTE — PROGRESS NOTES
[facility] - Intensive Care ([facility])  Nephrology  Progress Note    Patient Name: Lavelle Ladd  MRN: [id number]  Admission Date: 4/5/2023  Hospital Length of Stay: 4 days  Attending Provider: Steve Hanna MD   Primary Care Physician: Primary Doctor No  Principal Problem:Septic shock    Consults  Subjective:     Interval History:  Patient was step-down to the floor from ICU yesterday.  In bed resting comfortably.  No acute distress noted.      Review of systems:  No new complaints from overnight.  No shortness of breath or chest discomfort.  No fevers or chills.  No nausea vomiting.  No swelling.  Complains of pain at his right lower extremity stump.    Review of patient's allergies indicates:  No Known Allergies  Current Facility-Administered Medications   Medication Frequency    0.9%  NaCl infusion PRN    acetaminophen tablet 650 mg Q6H PRN    cefTRIAXone (ROCEPHIN) 2 g in dextrose 5 % in water (D5W) 5 % 50 mL IVPB (MB+) Q24H    dextrose 10% bolus 125 mL 125 mL PRN    dextrose 10% bolus 125 mL 125 mL PRN    dextrose 10% bolus 250 mL 250 mL PRN    dextrose 10% bolus 250 mL 250 mL PRN    famotidine tablet 20 mg Daily    gabapentin capsule 300 mg TID    glucagon (human recombinant) injection 1 mg PRN    heparin (porcine) injection 5,000 Units Q8H    HYDROcodone-acetaminophen 5-325 mg per tablet 1 tablet Q4H PRN    influenza 65up-adj (QUADRIVALENT ADJUVANTED PF) vaccine 0.5 mL vaccine x 1 dose    insulin aspart U-100 pen 1-10 Units QID (AC + HS) PRN    metroNIDAZOLE tablet 500 mg Q8H    mupirocin 2 % ointment BID    ondansetron injection 4 mg Q6H PRN    sodium bicarbonate tablet 1,300 mg BID    tacrolimus capsule 1 mg BID    vancomycin - pharmacy to dose pharmacy to manage frequency    vancomycin SolR 125 mg Q6H       Objective:     Vital Signs (Most Recent):  Temp: 96.2 °F (35.7 °C) (04/09/23 0810)  Pulse: 72 (04/09/23 0810)  Resp: 18 (04/09/23 0812)  BP: (!) 102/56 (04/09/23 0810)  SpO2: 97 % (04/09/23 0810)    Vital Signs (24h Range):  Temp:  [94.2 °F (34.6 °C)-98.9 °F (37.2 °C)] 96.2 °F (35.7 °C)  Pulse:  [70-74] 72  Resp:  [13-25] 18  SpO2:  [95 %-97 %] 97 %  BP: ()/(41-68) 102/56     Weight: 84.6 kg (186 lb 6.4 oz) (04/05/23 1931)  Body mass index is 26 kg/m².  Body surface area is 2.06 meters squared.    I/O last 3 completed shifts:  In: 341.1 [IV Piggyback:341.1]  Out: 3665 [Urine:3465; Stool:200]    Physical Exam  Constitutional:       Appearance: Normal appearance.   HENT:      Head: Normocephalic and atraumatic.   Eyes:      General: No scleral icterus.     Extraocular Movements: Extraocular movements intact.      Pupils: Pupils are equal, round, and reactive to light.   Cardiovascular:      Rate and Rhythm: Normal rate and regular rhythm.   Pulmonary:      Effort: Pulmonary effort is normal.      Breath sounds: Normal breath sounds.   Musculoskeletal:      Right lower leg: No edema.      Left lower leg: No edema.   Skin:     General: Skin is warm and dry.   Neurological:      General: No focal deficit present.      Mental Status: He is alert and oriented to person, place, and time.   Psychiatric:         Mood and Affect: Mood normal.         Behavior: Behavior normal.       Significant Labs:sureBMP:   Recent Labs   Lab 04/09/23  0655   *   *   K 3.9      CO2 21*   BUN 25*   CREATININE 1.7*   CALCIUM 8.7   MG 2.1       CMP:   Recent Labs   Lab 04/05/23  1758 04/06/23  0412 04/09/23  0655   GLU 92   < > 135*   CALCIUM 9.0   < > 8.7   ALBUMIN 2.6*  --   --    PROT 7.5  --   --    *   < > 134*   K 4.5   < > 3.9   CO2 16*   < > 21*      < > 106   BUN 34*   < > 25*   CREATININE 2.2*   < > 1.7*   ALKPHOS 164*  --   --    ALT 31  --   --    AST 46*  --   --    BILITOT 0.4  --   --     < > = values in this interval not displayed.       All labs within the past 24 hours have been reviewed.    Significant Imaging:  Labs: Reviewed      Assessment/Plan:     Active Diagnoses:    Diagnosis  Date Noted POA    PRINCIPAL PROBLEM:  Septic shock [A41.9, R65.21] 2018 Yes    Bacteremia [R78.81] 2023 Yes    Clostridium difficile infection [A49.8] 2023 Yes    Osteomyelitis [M86.9] 11/10/2018 Yes    Immunosuppression [D84.9]  Yes    Type 2 diabetes mellitus with hyperglycemia, with long-term current use of insulin [E11.65, Z79.4]  Not Applicable     donor kidney transplant for DM 16, DINORAH on CKD [Z94.0]  Not Applicable     Chronic      Problems Resolved During this Admission:       Assessment and plan:    1. Kidney transplant status: Status post kidney transplant in 2016.  Baseline creatinine appears run between about 1.2 and 1.4.    2. DINORAH:  Creatinine has improved from 2.1 down to 1.7 this morning.  Urine output is not being recorded..    3. Immunosuppression:  Prograf dose was recently adjusted.  He is currently on 1 mg twice a day.  Most recent Prograf level is 5.5.  Will plan to check Prograf again tomorrow.    4. Electrolytes: Potassium is stable at 3.9.  Bicarbonate is slightly low at 21.       Thank you for your consult.     Jose David Hooker MD  Nephrology  [place] - Intensive Care ([facility])

## 2023-04-09 NOTE — PROGRESS NOTES
[facility] Medicine  Progress Note    Patient Name: Lavelle Ladd  MRN: [id number]  Patient Class: IP- Inpatient   Admission Date: 4/5/2023  Length of Stay: 4 days  Attending Physician: Steve Hanna MD  Primary Care Provider: Primary Doctor No        Subjective:     Principal Problem:Septic shock        HPI:  68 y/o WM with a PMHx of CAD, CHF, COPD, kidney transplant, HLD, HTN, PAD, PVD,  B/L LE BKA  and DM2 Presents to ED on 4/5 with intractable vomiting. Was at [facility] with plan for d/c  but noted vomiting, fever, cough, body aches, diarrhea.ED evaluation revealed foul smelling drainage from rt stump wound.X ray concerning with osseous and soft tissue changes suspicious for osteo.Labs - wbc 10.5; lactate 2.7; procal 7; creatinine 2.2 (baseline 1.2) blood cultures obtained.Treated with 2.5L IVF (plus 500ml from EMS), tylenol, vanco, rocephin.Refractory hypotension requiring pressor support and admitted to ICU. ID and nephrology cosnulted . Blood cx (+) for strep and proteus  .star Wound cx (+) for GNR. Cdiff PCR positive . He is on IV vanc , rocephin , po vanc   and po metronidazole .   Pt has been   admitted multiple times since 8 /4/22  in multiples facility including  [facility], [facility] , [facility]  and  [facility] .    IM consulted to assume care .             Overview/Hospital Course:  Admitted for septic shock s/t bacteremia, osteomyelitis, LE wounds, and Cdiff colitis. Continue IV abx per cultures and wound care. Pt clinically improving since admission, consult  for postacute placement      Review of Systems   Constitutional:  Positive for fatigue. Negative for fever.   HENT:  Negative for trouble swallowing.    Eyes: Negative.    Respiratory:  Negative for shortness of breath.    Cardiovascular:  Negative for chest pain.   Gastrointestinal:  Positive for diarrhea. Negative for vomiting.   Musculoskeletal:  Positive for back pain and myalgias.   Skin: Negative.    Neurological:   Negative for syncope and headaches.   Psychiatric/Behavioral:  The patient is nervous/anxious.    Objective:     Vital Signs (Most Recent):  Temp: 98.3 °F (36.8 °C) (04/09/23 1609)  Pulse: 76 (04/09/23 1609)  Resp: 17 (04/09/23 1609)  BP: (!) 102/44 (04/09/23 1609)  SpO2: 96 % (04/09/23 1609)   Vital Signs (24h Range):  Temp:  [94.2 °F (34.6 °C)-98.3 °F (36.8 °C)] 98.3 °F (36.8 °C)  Pulse:  [70-77] 76  Resp:  [16-25] 17  SpO2:  [95 %-97 %] 96 %  BP: ()/(41-68) 102/44     Weight: 84.6 kg (186 lb 6.4 oz)  Body mass index is 26 kg/m².    Intake/Output Summary (Last 24 hours) at 4/9/2023 1627  Last data filed at 4/9/2023 1143  Gross per 24 hour   Intake 297.45 ml   Output 2035 ml   Net -1737.55 ml      Physical Exam  Vitals reviewed.   Constitutional:       General: He is awake. He is not in acute distress.     Comments: Chronically ill appearing male lying in bed on RA in NAD   Cardiovascular:      Rate and Rhythm: Normal rate.      Pulses:           Radial pulses are 2+ on the right side and 2+ on the left side.   Pulmonary:      Effort: Pulmonary effort is normal.      Breath sounds: Normal breath sounds.      Comments: On RA  Abdominal:      General: There is no distension.      Palpations: Abdomen is soft.   Musculoskeletal:      Right lower leg: No edema.      Left lower leg: No edema.      Comments: BLE amputations   Skin:     General: Skin is warm and dry.   Neurological:      General: No focal deficit present.      Mental Status: He is alert.   Psychiatric:         Behavior: Behavior is cooperative.       Significant Labs: All pertinent labs within the past 24 hours have been reviewed.    Significant Imaging: I have reviewed all pertinent imaging results/findings within the past 24 hours.      Assessment/Plan:      * Septic shock  S/p vasopressor  Etiology = bacteremia , wound infection ,C,diff and osteo   Cont IVAB   ID on board       Bacteremia  Microbiology Results (last 7 days)     Procedure Component  Value Units Date/Time    Culture, Anaerobe [[id number]] Collected: 04/06/23 1707    Order Status: Completed Specimen: Wound from Leg, Right Updated: 04/08/23 1357     Anaerobic Culture Culture in progress    Narrative:      Right stump wound    Blood culture x two cultures. Draw prior to antibiotics. [[id number]]  (Abnormal)  (Susceptibility) Collected: 04/05/23 1757    Order Status: Completed Specimen: Blood from Peripheral, Antecubital, Right Updated: 04/08/23 1019     Blood Culture, Routine Gram stain susan bottle: Gram negative rods, Gram positive cocci      Results called to and read back by: Ashley Nails RN 04/06/2023  14:48      Gram stain aer bottle: Gram positive cocci and Gram negative rods      Positive results previously called 04/06/2023      STREPTOCOCCUS AGALACTIAE (GROUP B)  Susceptibility pending  Beta-hemolytic streptococci are routinely susceptible to   penicillins,cephalosporins and carbapenems.        PROTEUS MIRABILIS    Narrative:      Aerobic and anaerobic    Blood culture x two cultures. Draw prior to antibiotics. [[id number]]  (Abnormal) Collected: 04/05/23 1945    Order Status: Completed Specimen: Blood from Peripheral, Wrist, Right Updated: 04/08/23 1019     Blood Culture, Routine Gram stain susan bottle: Gram negative rods      Results called to and read back by: Ashley Nails RN 04/06/2023 14:48      Gram stain aer bottle: Gram negative rods      Positive results previously called 04/07/2023  20:26      PROTEUS MIRABILIS  For susceptibility see order #[id number]      Narrative:      Aerobic and anaerobic    Aerobic culture [[id number]]  (Abnormal) Collected: 04/06/23 1707    Order Status: Completed Specimen: Wound from Leg, Right Updated: 04/08/23 0738     Aerobic Bacterial Culture GRAM NEGATIVE MIGUEL ANGEL  Moderate  Identification and susceptibility pending  Skin skylar also present      Narrative:      Right stump wound    Urine culture [[id number]] Collected: 04/06/23 0940    Order Status: Completed  Specimen: Urine Updated: 04/1953     Urine Culture, Routine No growth    Narrative:      Specimen Source->Urine    C Diff Toxin by PCR [[id number]]  (Abnormal) Collected: 04/06/23 1145    Order Status: Completed Updated: 04/07/23 0520     C. diff PCR Positive    Clostridium difficile EIA [[id number]]  (Abnormal) Collected: 04/06/23 1145    Order Status: Completed Specimen: Stool Updated: 04/06/23 1621     C. diff Antigen Positive     C difficile Toxins A+B, EIA Negative     Comment: Testing not recommended for children <24 months old.       Rapid Organism ID by PCR (from Blood culture) [[id number]]  (Abnormal) Collected: 04/05/23 1757    Order Status: Completed Updated: 04/06/23 1524     Enterococcus faecalis Detected     Enterococcus faecium Not Detected     Listeria Monocytogenes Not Detected     Staphylococcus spp. Not Detected     Staphylococcus aureus Not Detected     Staphylococcus epidermidis Not Detected     Staphylococcus lugdunensis Not Detected     Streptococcus species See species for ID     Streptococcus agalactiae Detected     Streptococcus pneumoniae Not Detected     Streptococcus pyogenes Not Detected     Acinetobacter calcoaceticus/baumannii complex Not Detected     Bacteroides fragilis Not Detected     Enterobacerales See species for ID     Enterobacter cloacae complex Not Detected     Escherichia Not Detected     Klebsiella aerogenes Not Detected     Klebsiella oxytoca Not Detected     Klebsiella pneumoniae group Not Detected     Proteus Detected     Salmonella sp Not Detected     Serratia marcescens Not Detected     Haemophilus influenzae Not Detected     Neisseria meningtidis Not Detected     Pseudomonas aeruginosa Not Detected     Stenotrophomonas maltophilia Not Detected     Candida albicans Not Detected     Candida auris Not Detected     Candida glabrata Not Detected     Candida krusei Not Detected     Candida parapsilosis Not Detected     Candida tropicalis Not Detected     Cryptococcus  neoformans/gattii Not Detected     CTX-M (ESBL ) Not Detected     IMP (Carbapenem resistant) Not Detected     KPC resistance gene (Carbapenem resistant) Not Detected     mcr-1  Test Not Applicable     mec A/C  Test Not Applicable     mec A/C and MREJ (MRSA) gene Test Not Applicable     NDM (Carbapenem resistant) Not Detected     OXA-48-like (Carbapenem resistant) Not Detected     van A/B (VRE gene) Not Detected     VIM (Carbapenem resistant) Not Detected    Narrative:      Aerobic and anaerobic    Culture, Anaerobe [[id number]] Collected: 23    Order Status: Sent Specimen: Wound from Leg, Right Updated: 23          ID on board  Cont IVAB     Clostridium difficile infection  Cont isolation   Cont po vanc       Osteomyelitis  Cont IVAB  F/U inflammatory markers  Plan to do Right stump MRI   ID on board       Immunosuppression  Cont current tx       Type 2 diabetes mellitus with hyperglycemia, with long-term current use of insulin  Patient's FSGs are controlled on current medication regimen.  Last A1c reviewed-   Lab Results   Component Value Date    HGBA1C 6.7 (H) 2023     Most recent fingerstick glucose reviewed-   Recent Labs   Lab 23  1727 23  2141 23  0803 23  1118   POCTGLUCOSE 184* 171* 106 228*     Current correctional scale  Medium  Maintain anti-hyperglycemic dose as follows-   Antihyperglycemics (From admission, onward)    Start     Stop Route Frequency Ordered    23 0823  insulin aspart U-100 pen 1-10 Units         -- SubQ Before meals & nightly PRN 23 0724        Hold Oral hypoglycemics while patient is in the hospital.     donor kidney transplant for DM 16, DINORAH on CKD  Nephrology on board          VTE Risk Mitigation (From admission, onward)         Ordered     heparin (porcine) injection 5,000 Units  Every 8 hours         23 2207                Discharge Planning   LISETH:      Code Status: Full Code   Is the patient  medically ready for discharge?:     Reason for patient still in hospital (select all that apply): Patient trending condition, Treatment, Consult recommendations and Pending disposition  Discharge Plan A: Skilled Nursing Facility                  Steve Hanna MD  Department of Hospital Medicine   '[facility]

## 2023-04-09 NOTE — PLAN OF CARE
POC discussed with patient, verbalized understanding. VSS. Prn pain medication given per MAR. Right stump cleaned and redressed per orders; remains clean, dry, and intact. Multiple loose BM during shift. Blood glucose monitored, insulin administered per order. Cardiac monitoring continued. Patient remains free from injury, safety precautions maintained. Contact precautions maintained. Chart review completed.

## 2023-04-09 NOTE — SUBJECTIVE & OBJECTIVE
Review of Systems   Constitutional:  Positive for fatigue. Negative for fever.   HENT:  Negative for trouble swallowing.    Eyes: Negative.    Respiratory:  Negative for shortness of breath.    Cardiovascular:  Negative for chest pain.   Gastrointestinal:  Positive for diarrhea. Negative for vomiting.   Musculoskeletal:  Positive for back pain and myalgias.   Skin: Negative.    Neurological:  Negative for syncope and headaches.   Psychiatric/Behavioral:  The patient is nervous/anxious.    Objective:     Vital Signs (Most Recent):  Temp: 98.3 °F (36.8 °C) (04/09/23 1609)  Pulse: 76 (04/09/23 1609)  Resp: 17 (04/09/23 1609)  BP: (!) 102/44 (04/09/23 1609)  SpO2: 96 % (04/09/23 1609)   Vital Signs (24h Range):  Temp:  [94.2 °F (34.6 °C)-98.3 °F (36.8 °C)] 98.3 °F (36.8 °C)  Pulse:  [70-77] 76  Resp:  [16-25] 17  SpO2:  [95 %-97 %] 96 %  BP: ()/(41-68) 102/44     Weight: 84.6 kg (186 lb 6.4 oz)  Body mass index is 26 kg/m².    Intake/Output Summary (Last 24 hours) at 4/9/2023 1627  Last data filed at 4/9/2023 1143  Gross per 24 hour   Intake 297.45 ml   Output 2035 ml   Net -1737.55 ml      Physical Exam  Vitals reviewed.   Constitutional:       General: He is awake. He is not in acute distress.     Comments: Chronically ill appearing male lying in bed on RA in NAD   Cardiovascular:      Rate and Rhythm: Normal rate.      Pulses:           Radial pulses are 2+ on the right side and 2+ on the left side.   Pulmonary:      Effort: Pulmonary effort is normal.      Breath sounds: Normal breath sounds.      Comments: On RA  Abdominal:      General: There is no distension.      Palpations: Abdomen is soft.   Musculoskeletal:      Right lower leg: No edema.      Left lower leg: No edema.      Comments: BLE amputations   Skin:     General: Skin is warm and dry.   Neurological:      General: No focal deficit present.      Mental Status: He is alert.   Psychiatric:         Behavior: Behavior is cooperative.       Significant  Labs: All pertinent labs within the past 24 hours have been reviewed.    Significant Imaging: I have reviewed all pertinent imaging results/findings within the past 24 hours.

## 2023-04-09 NOTE — PLAN OF CARE
Pt received from ICU in stable condition. . Call bell and personal items within reach. VSS. Pain controlled with PRN meds.  Glucose monitoring continued. Chart check complete.     Problem: Adult Inpatient Plan of Care  Goal: Plan of Care Review  Outcome: Ongoing, Progressing     Problem: Adult Inpatient Plan of Care  Goal: Absence of Hospital-Acquired Illness or Injury  Outcome: Ongoing, Progressing     Problem: Adult Inpatient Plan of Care  Goal: Patient-Specific Goal (Individualized)  Outcome: Ongoing, Progressing     Problem: Diabetes Comorbidity  Goal: Blood Glucose Level Within Targeted Range  Outcome: Ongoing, Progressing

## 2023-04-09 NOTE — PLAN OF CARE
VSS this shift. Pt given PRN pain meds Q4- see MAR. Wound care performed on right stump with vashe cleanser. Central line removed and PIV placed. Rectal tube removed d/t pts BM becoming more soft and formed and no longer liquid. Pt received transfer orders, awaiting on room availability. POC reviewed with pt. Fall and safety precautions in place. Call light within reach.

## 2023-04-09 NOTE — CONSULTS
24 hour random resulted as 22.5. Patient's cultures updated and antibiotics were adjusted accordingly.  Therapy with vancomycin complete and consult discontinued by provider.  Pharmacy will sign off, please re-consult as needed.   Samantha Powell PharmBERNARDO 4/9/2023 3:58 PM

## 2023-04-09 NOTE — HOSPITAL COURSE
Admitted for septic shock s/t bacteremia, osteomyelitis, LE wounds, and Cdiff colitis. Continue IV abx per cultures and wound care. Per ID, will need 6 weeks of cefepime and flagyl. PICC line placed. Pt clinically improving since admission, consult SW for postacute placement. Pt prefers to DC home with HH. Nephrology following for DINORAH.

## 2023-04-10 NOTE — PLAN OF CARE
04/10/23 1138   Discharge Reassessment   Assessment Type Discharge Planning Reassessment   Did the patient's condition or plan change since previous assessment? No   Discharge Plan discussed with: Patient   Communicated LISETH with patient/caregiver Date not available/Unable to determine   Discharge Plan A Skilled Nursing Facility   DME Needed Upon Discharge  none   Discharge Barriers Identified None   Why the patient remains in the hospital Requires continued medical care   Post-Acute Status   Post-Acute Authorization Placement   Post-Acute Placement Status Referrals Sent   Discharge Delays None known at this time     SNF referrals sent. Sw to follow up, as needed, regarding d/c plans.

## 2023-04-10 NOTE — PLAN OF CARE
04/10/23 1115   Post-Acute Status   Post-Acute Authorization Placement   Post-Acute Placement Status Referrals Sent   Discharge Delays None known at this time   Discharge Plan   Discharge Plan A Skilled Nursing Facility   Discharge Plan B Home Health     Sw spoke with pt this morning to discuss treatment team recommendations for SNF placement upon d/c; pt is agreeable. Pt stated he has been to [facility] previously. Pt doesn't have a preference on placement at this time. Pt is agreeable for Sw to send mass referrals in the area.

## 2023-04-10 NOTE — PLAN OF CARE
Fall precautions maintained. Pt had 1 small BM during shift. Mild pain control with around the clock dosing. Pt only wants skin assessed after using the bedpan and turns independently. Glucose monitoring continue. Chart check complete.     Problem: Adult Inpatient Plan of Care  Goal: Plan of Care Review  Outcome: Ongoing, Progressing       Problem: Adult Inpatient Plan of Care  Goal: Patient-Specific Goal (Individualized)  Outcome: Ongoing, Progressing     Problem: Adult Inpatient Plan of Care  Goal: Absence of Hospital-Acquired Illness or Injury  Outcome: Ongoing, Progressing

## 2023-04-10 NOTE — PLAN OF CARE
Discussed poc with pt, pt verbalized understanding    Purposeful rounding every 2hours    VS wnl  Cardiac monitoring in use, pt is NSR, tele monitor # [id number]  Blood glucose monitoring   Fall precautions in place, remains injury free  Pain and nausea under control with PRN meds    IVFs  Accurate I&Os  Abx given as prescribed  Bed locked at lowest position  Call light within reach    Chart check complete  Will cont with POC

## 2023-04-10 NOTE — PT/OT/SLP PROGRESS
"Occupational Therapy      Patient Name:  Lavelle Ladd   MRN:  [id number]    O.T. COMPLETED CHART REVIEW AND SPOKE WITH NURSE WARREN PRIOR TO ENTRY TO ROOM. PT SUPINE IN BED ON PHONE. PT REPORTED ON BED NAIK AND PT APPEARED IRRITABLE AND REFUSED THERAPY EVAL AT THIS TIME.. ASKED PT IF HE WOULD LIKE THERAPY TO TRY BACK LATER TODAY AND PT YELLED, " I DON'T CARE." PT LEFT SUPINE IN BED NURSE AWARE.     4/10/2023  10:40    "

## 2023-04-10 NOTE — SUBJECTIVE & OBJECTIVE
Review of Systems   Constitutional:  Positive for fatigue. Negative for fever.   HENT:  Negative for trouble swallowing.    Eyes: Negative.    Respiratory:  Negative for shortness of breath.    Cardiovascular:  Negative for chest pain.   Gastrointestinal:  Positive for diarrhea. Negative for vomiting.   Musculoskeletal:  Positive for back pain and myalgias.   Skin: Negative.    Neurological:  Negative for syncope and headaches.   Psychiatric/Behavioral:  The patient is nervous/anxious.    Objective:     Vital Signs (Most Recent):  Temp: 97.4 °F (36.3 °C) (04/10/23 1225)  Pulse: 70 (04/10/23 1629)  Resp: 18 (04/10/23 1629)  BP: (!) 176/78 (04/10/23 1629)  SpO2: (!) 93 % (04/10/23 1629)   Vital Signs (24h Range):  Temp:  [96.2 °F (35.7 °C)-98.3 °F (36.8 °C)] 97.4 °F (36.3 °C)  Pulse:  [67-77] 70  Resp:  [16-20] 18  SpO2:  [93 %-96 %] 93 %  BP: (102-176)/(44-78) 176/78     Weight: 84.4 kg (186 lb)  Body mass index is 25.94 kg/m².    Intake/Output Summary (Last 24 hours) at 4/10/2023 1653  Last data filed at 4/10/2023 0800  Gross per 24 hour   Intake 240 ml   Output 1300 ml   Net -1060 ml        Physical Exam  Vitals reviewed.   Constitutional:       General: He is awake. He is not in acute distress.     Comments: Chronically ill appearing male lying in bed on RA in NAD   Cardiovascular:      Rate and Rhythm: Normal rate.      Pulses:           Radial pulses are 2+ on the right side and 2+ on the left side.   Pulmonary:      Effort: Pulmonary effort is normal.      Breath sounds: Normal breath sounds.      Comments: On RA  Abdominal:      General: There is no distension.      Palpations: Abdomen is soft.   Musculoskeletal:      Right lower leg: No edema.      Left lower leg: No edema.      Comments: BLE amputations   Skin:     General: Skin is warm and dry.   Neurological:      General: No focal deficit present.      Mental Status: He is alert.   Psychiatric:         Behavior: Behavior is cooperative.       Significant  Labs: All pertinent labs within the past 24 hours have been reviewed.    Significant Imaging: I have reviewed all pertinent imaging results/findings within the past 24 hours.

## 2023-04-10 NOTE — PT/OT/SLP PROGRESS
"Physical Therapy      Patient Name:  Lavelle Ladd   MRN:  [id number]    10:50 P.T. COMPLETED CHART REVIEW AND SPOKE WITH NURSE WARREN PRIOR TO ENTRY TO ROOM. PT SUP IN BED ON PHONE. PT APPEARED IRRITABLE AND REFUSED P.T. EVAL AT THIS TIME. P.T. ASKED PT IF HE WOULD LIKE P.T. TO TRY BACK LATER TODAY AND PT YELLED, " I DON'T CARE." PT LEFT SUP IN BED NURSE AWARE. Edilia Gillespie, PT, 4/10/2023      " normal...

## 2023-04-10 NOTE — PROGRESS NOTES
[facility] Medicine  Progress Note    Patient Name: Lavelle Ladd  MRN: [id number]  Patient Class: IP- Inpatient   Admission Date: 4/5/2023  Length of Stay: 5 days  Attending Physician: Steve Hanna MD  Primary Care Provider: Primary Doctor No        Subjective:     Principal Problem:Septic shock        HPI:  66 y/o WM with a PMHx of CAD, CHF, COPD, kidney transplant, HLD, HTN, PAD, PVD,  B/L LE BKA  and DM2 Presents to ED on 4/5 with intractable vomiting. Was at [facility] with plan for d/c  but noted vomiting, fever, cough, body aches, diarrhea.ED evaluation revealed foul smelling drainage from rt stump wound.X ray concerning with osseous and soft tissue changes suspicious for osteo.Labs - wbc 10.5; lactate 2.7; procal 7; creatinine 2.2 (baseline 1.2) blood cultures obtained.Treated with 2.5L IVF (plus 500ml from EMS), tylenol, vanco, rocephin.Refractory hypotension requiring pressor support and admitted to ICU. ID and nephrology cosnulted . Blood cx (+) for strep and proteus  .star Wound cx (+) for GNR. Cdiff PCR positive . He is on IV vanc , rocephin , po vanc   and po metronidazole .   Pt has been   admitted multiple times since 8 /4/22  in multiples facility including  [facility], [facility] , [facility]  and  [facility] .    IM consulted to assume care .             Overview/Hospital Course:  Admitted for septic shock s/t bacteremia, osteomyelitis, LE wounds, and Cdiff colitis. Continue IV abx per cultures and wound care. Pt clinically improving since admission, consult  for postacute placement      Review of Systems   Constitutional:  Positive for fatigue. Negative for fever.   HENT:  Negative for trouble swallowing.    Eyes: Negative.    Respiratory:  Negative for shortness of breath.    Cardiovascular:  Negative for chest pain.   Gastrointestinal:  Positive for diarrhea. Negative for vomiting.   Musculoskeletal:  Positive for back pain and myalgias.   Skin: Negative.    Neurological:   Negative for syncope and headaches.   Psychiatric/Behavioral:  The patient is nervous/anxious.    Objective:     Vital Signs (Most Recent):  Temp: 97.4 °F (36.3 °C) (04/10/23 1225)  Pulse: 70 (04/10/23 1629)  Resp: 18 (04/10/23 1629)  BP: (!) 176/78 (04/10/23 1629)  SpO2: (!) 93 % (04/10/23 1629)   Vital Signs (24h Range):  Temp:  [96.2 °F (35.7 °C)-98.3 °F (36.8 °C)] 97.4 °F (36.3 °C)  Pulse:  [67-77] 70  Resp:  [16-20] 18  SpO2:  [93 %-96 %] 93 %  BP: (102-176)/(44-78) 176/78     Weight: 84.4 kg (186 lb)  Body mass index is 25.94 kg/m².    Intake/Output Summary (Last 24 hours) at 4/10/2023 1653  Last data filed at 4/10/2023 0800  Gross per 24 hour   Intake 240 ml   Output 1300 ml   Net -1060 ml        Physical Exam  Vitals reviewed.   Constitutional:       General: He is awake. He is not in acute distress.     Comments: Chronically ill appearing male lying in bed on RA in NAD   Cardiovascular:      Rate and Rhythm: Normal rate.      Pulses:           Radial pulses are 2+ on the right side and 2+ on the left side.   Pulmonary:      Effort: Pulmonary effort is normal.      Breath sounds: Normal breath sounds.      Comments: On RA  Abdominal:      General: There is no distension.      Palpations: Abdomen is soft.   Musculoskeletal:      Right lower leg: No edema.      Left lower leg: No edema.      Comments: BLE amputations   Skin:     General: Skin is warm and dry.   Neurological:      General: No focal deficit present.      Mental Status: He is alert.   Psychiatric:         Behavior: Behavior is cooperative.       Significant Labs: All pertinent labs within the past 24 hours have been reviewed.    Significant Imaging: I have reviewed all pertinent imaging results/findings within the past 24 hours.      Assessment/Plan:      * Septic shock  S/p vasopressor  Etiology = bacteremia , wound infection ,C,diff and osteo   Cont IVAB   ID on board       Bacteremia  Microbiology Results (last 7 days)     Procedure Component  Value Units Date/Time    Culture, Anaerobe [[id number]] Collected: 04/06/23 1707    Order Status: Completed Specimen: Wound from Leg, Right Updated: 04/08/23 1357     Anaerobic Culture Culture in progress    Narrative:      Right stump wound    Blood culture x two cultures. Draw prior to antibiotics. [[id number]]  (Abnormal)  (Susceptibility) Collected: 04/05/23 1757    Order Status: Completed Specimen: Blood from Peripheral, Antecubital, Right Updated: 04/08/23 1019     Blood Culture, Routine Gram stain susan bottle: Gram negative rods, Gram positive cocci      Results called to and read back by: Ashley Nails RN 04/06/2023  14:48      Gram stain aer bottle: Gram positive cocci and Gram negative rods      Positive results previously called 04/06/2023      STREPTOCOCCUS AGALACTIAE (GROUP B)  Susceptibility pending  Beta-hemolytic streptococci are routinely susceptible to   penicillins,cephalosporins and carbapenems.        PROTEUS MIRABILIS    Narrative:      Aerobic and anaerobic    Blood culture x two cultures. Draw prior to antibiotics. [[id number]]  (Abnormal) Collected: 04/05/23 1945    Order Status: Completed Specimen: Blood from Peripheral, Wrist, Right Updated: 04/08/23 1019     Blood Culture, Routine Gram stain susan bottle: Gram negative rods      Results called to and read back by: Ashley Nails RN 04/06/2023 14:48      Gram stain aer bottle: Gram negative rods      Positive results previously called 04/07/2023  20:26      PROTEUS MIRABILIS  For susceptibility see order #[id number]      Narrative:      Aerobic and anaerobic    Aerobic culture [[id number]]  (Abnormal) Collected: 04/06/23 1707    Order Status: Completed Specimen: Wound from Leg, Right Updated: 04/08/23 0738     Aerobic Bacterial Culture GRAM NEGATIVE MIGUEL ANGEL  Moderate  Identification and susceptibility pending  Skin skylar also present      Narrative:      Right stump wound    Urine culture [[id number]] Collected: 04/06/23 0940    Order Status: Completed  Specimen: Urine Updated: 04/1953     Urine Culture, Routine No growth    Narrative:      Specimen Source->Urine    C Diff Toxin by PCR [[id number]]  (Abnormal) Collected: 04/06/23 1145    Order Status: Completed Updated: 04/07/23 0520     C. diff PCR Positive    Clostridium difficile EIA [[id number]]  (Abnormal) Collected: 04/06/23 1145    Order Status: Completed Specimen: Stool Updated: 04/06/23 1621     C. diff Antigen Positive     C difficile Toxins A+B, EIA Negative     Comment: Testing not recommended for children <24 months old.       Rapid Organism ID by PCR (from Blood culture) [[id number]]  (Abnormal) Collected: 04/05/23 1757    Order Status: Completed Updated: 04/06/23 1524     Enterococcus faecalis Detected     Enterococcus faecium Not Detected     Listeria Monocytogenes Not Detected     Staphylococcus spp. Not Detected     Staphylococcus aureus Not Detected     Staphylococcus epidermidis Not Detected     Staphylococcus lugdunensis Not Detected     Streptococcus species See species for ID     Streptococcus agalactiae Detected     Streptococcus pneumoniae Not Detected     Streptococcus pyogenes Not Detected     Acinetobacter calcoaceticus/baumannii complex Not Detected     Bacteroides fragilis Not Detected     Enterobacerales See species for ID     Enterobacter cloacae complex Not Detected     Escherichia Not Detected     Klebsiella aerogenes Not Detected     Klebsiella oxytoca Not Detected     Klebsiella pneumoniae group Not Detected     Proteus Detected     Salmonella sp Not Detected     Serratia marcescens Not Detected     Haemophilus influenzae Not Detected     Neisseria meningtidis Not Detected     Pseudomonas aeruginosa Not Detected     Stenotrophomonas maltophilia Not Detected     Candida albicans Not Detected     Candida auris Not Detected     Candida glabrata Not Detected     Candida krusei Not Detected     Candida parapsilosis Not Detected     Candida tropicalis Not Detected     Cryptococcus  neoformans/gattii Not Detected     CTX-M (ESBL ) Not Detected     IMP (Carbapenem resistant) Not Detected     KPC resistance gene (Carbapenem resistant) Not Detected     mcr-1  Test Not Applicable     mec A/C  Test Not Applicable     mec A/C and MREJ (MRSA) gene Test Not Applicable     NDM (Carbapenem resistant) Not Detected     OXA-48-like (Carbapenem resistant) Not Detected     van A/B (VRE gene) Not Detected     VIM (Carbapenem resistant) Not Detected    Narrative:      Aerobic and anaerobic    Culture, Anaerobe [[id number]] Collected: 23    Order Status: Sent Specimen: Wound from Leg, Right Updated: 23          ID on board  Cont IVAB     Clostridium difficile infection  Cont isolation   Cont po vanc       Osteomyelitis  Cont IVAB  F/U inflammatory markers  Plan to do Right stump MRI   ID on board       Immunosuppression  Cont current tx       Type 2 diabetes mellitus with hyperglycemia, with long-term current use of insulin  Patient's FSGs are controlled on current medication regimen.  Last A1c reviewed-   Lab Results   Component Value Date    HGBA1C 6.7 (H) 2023     Most recent fingerstick glucose reviewed-   Recent Labs   Lab 23  1727 23  2141 23  0803 23  1118   POCTGLUCOSE 184* 171* 106 228*     Current correctional scale  Medium  Maintain anti-hyperglycemic dose as follows-   Antihyperglycemics (From admission, onward)    Start     Stop Route Frequency Ordered    23 0823  insulin aspart U-100 pen 1-10 Units         -- SubQ Before meals & nightly PRN 23 0724        Hold Oral hypoglycemics while patient is in the hospital.     donor kidney transplant for DM 16, DINORAH on CKD  Nephrology on board          VTE Risk Mitigation (From admission, onward)         Ordered     heparin (porcine) injection 5,000 Units  Every 8 hours         23 2207                Discharge Planning    LISETH:      Code Status: Full Code   Is the patient  medically ready for discharge?:     Reason for patient still in hospital (select all that apply): Patient trending condition, Treatment, Consult recommendations and Pending disposition  Discharge Plan A: Skilled Nursing Facility   Discharge Delays: None known at this time              Steve Hanna MD  Department of Hospital Medicine   [facility] - Med Surg

## 2023-04-10 NOTE — PROGRESS NOTES
[facility] - Intensive Care ([facility])  Nephrology  Progress Note    Patient Name: Lavelle Ladd  MRN: [id number]  Admission Date: 4/5/2023  Hospital Length of Stay: 5 days  Attending Provider: Steve Hanna MD   Primary Care Physician: Primary Doctor No  Principal Problem:Septic shock    Consults  Subjective:     Interval History:  Patient was step-down to the floor from ICU yesterday.  In bed resting comfortably working with the nursing staff.  No acute distress noted.      Review of systems:  No new complaints from overnight.  No shortness of breath or chest discomfort.  No fevers or chills.  No nausea vomiting.  No swelling.  Complains of pain at his right lower extremity stump.    Review of patient's allergies indicates:  No Known Allergies  Current Facility-Administered Medications   Medication Frequency    0.9%  NaCl infusion PRN    acetaminophen tablet 650 mg Q6H PRN    ceFEPIme (MAXIPIME) 2 g in dextrose 5 % in water (D5W) 5 % 50 mL IVPB (MB+) Q12H    dextrose 10% bolus 125 mL 125 mL PRN    dextrose 10% bolus 250 mL 250 mL PRN    famotidine tablet 20 mg Daily    gabapentin capsule 300 mg TID    glucagon (human recombinant) injection 1 mg PRN    heparin (porcine) injection 5,000 Units Q8H    HYDROcodone-acetaminophen  mg per tablet 1 tablet Q4H PRN    influenza 65up-adj (QUADRIVALENT ADJUVANTED PF) vaccine 0.5 mL vaccine x 1 dose    insulin aspart U-100 pen 1-10 Units QID (AC + HS) PRN    metroNIDAZOLE tablet 500 mg Q8H    morphine injection 2 mg Q6H PRN    ondansetron injection 4 mg Q6H PRN    sodium bicarbonate tablet 1,300 mg BID    tacrolimus capsule 1 mg BID    vancomycin SolR 125 mg Q6H       Objective:     Vital Signs (Most Recent):  Temp: 97.9 °F (36.6 °C) (04/10/23 0750)  Pulse: 71 (04/10/23 0750)  Resp: 18 (04/10/23 0750)  BP: (!) 105/54 (04/10/23 0750)  SpO2: (!) 94 % (04/10/23 0750)   Vital Signs (24h Range):  Temp:  [96.2 °F (35.7 °C)-98.3 °F (36.8 °C)] 97.9 °F (36.6 °C)  Pulse:  [71-77]  71  Resp:  [16-20] 18  SpO2:  [93 %-96 %] 94 %  BP: ()/(44-64) 105/54     Weight: 84.4 kg (186 lb) (04/09/23 1925)  Body mass index is 25.94 kg/m².  Body surface area is 2.06 meters squared.    I/O last 3 completed shifts:  In: 297.5 [IV Piggyback:297.5]  Out: 2435 [Urine:2435]    Physical Exam  Constitutional:       Appearance: Normal appearance.   HENT:      Head: Normocephalic and atraumatic.   Eyes:      General: No scleral icterus.     Extraocular Movements: Extraocular movements intact.      Pupils: Pupils are equal, round, and reactive to light.   Cardiovascular:      Rate and Rhythm: Normal rate and regular rhythm.   Pulmonary:      Effort: Pulmonary effort is normal.      Breath sounds: Normal breath sounds.   Musculoskeletal:      Right lower leg: No edema.      Left lower leg: No edema.   Skin:     General: Skin is warm and dry.   Neurological:      General: No focal deficit present.      Mental Status: He is alert and oriented to person, place, and time.   Psychiatric:         Mood and Affect: Mood normal.         Behavior: Behavior normal.       Significant Labs:sureBMP:   Recent Labs   Lab 04/10/23  0637   *      K 4.2      CO2 19*   BUN 22   CREATININE 1.8*   CALCIUM 8.7   MG 1.8       CMP:   Recent Labs   Lab 04/05/23  1758 04/06/23  0412 04/10/23  0637   GLU 92   < > 141*   CALCIUM 9.0   < > 8.7   ALBUMIN 2.6*  --   --    PROT 7.5  --   --    *   < > 136   K 4.5   < > 4.2   CO2 16*   < > 19*      < > 107   BUN 34*   < > 22   CREATININE 2.2*   < > 1.8*   ALKPHOS 164*  --   --    ALT 31  --   --    AST 46*  --   --    BILITOT 0.4  --   --     < > = values in this interval not displayed.       All labs within the past 24 hours have been reviewed.    Significant Imaging:  Labs: Reviewed      Assessment/Plan:     Active Diagnoses:    Diagnosis Date Noted POA    PRINCIPAL PROBLEM:  Septic shock [A41.9, R65.21] 09/18/2018 Yes    Bacteremia [R78.81] 04/08/2023 Yes     Clostridium difficile infection [A49.8] 2023 Yes    Osteomyelitis [M86.9] 11/10/2018 Yes    Immunosuppression [D84.9]  Yes    Type 2 diabetes mellitus with hyperglycemia, with long-term current use of insulin [E11.65, Z79.4]  Not Applicable     donor kidney transplant for DM 16, DINORAH on CKD [Z94.0]  Not Applicable     Chronic      Problems Resolved During this Admission:       Assessment and plan:    1. Kidney transplant status: Status post kidney transplant in .  Baseline creatinine appears run between about 1.2 and 1.4.    2. DINORAH:  Creatinine remained stable at 1.8.    3. Immunosuppression:  Prograf dose was recently adjusted.  He is currently on 1 mg twice a day.  Current Prograf level pending.  His last level was 5.5.    4. Electrolytes: Potassium is stable at 4.2.  Bicarbonate is slightly low at 19.       Thank you for your consult.     Jose David Hooker MD  Nephrology  [facility] - Intensive Care ([facility])

## 2023-04-11 NOTE — PROGRESS NOTES
[facility] - Intensive Care ([facility])  Nephrology  Progress Note    Patient Name: Lavelle Ladd  MRN: [id number]  Admission Date: 4/5/2023  Hospital Length of Stay: 6 days  Attending Provider: Jessica Gloria MD   Primary Care Physician: Primary Doctor No  Principal Problem:Septic shock    Consults  Subjective:     Interval History:  Patient was seen in his hospital room.  In bed resting comfortably.  No acute distress noted.  States he feels confused this morning.  Relates that he thought he had a girlfriend but apparently he does not.    Review of systems:  No new complaints from overnight.  No shortness of breath or chest discomfort.  No fevers or chills.  No nausea vomiting.  No swelling.      Review of patient's allergies indicates:  No Known Allergies  Current Facility-Administered Medications   Medication Frequency    0.9%  NaCl infusion PRN    acetaminophen tablet 650 mg Q6H PRN    ceFEPIme (MAXIPIME) 2 g in dextrose 5 % in water (D5W) 5 % 50 mL IVPB (MB+) Q12H    dextrose 10% bolus 125 mL 125 mL PRN    dextrose 10% bolus 250 mL 250 mL PRN    famotidine tablet 20 mg Daily    gabapentin capsule 300 mg TID    glucagon (human recombinant) injection 1 mg PRN    heparin (porcine) injection 5,000 Units Q8H    HYDROcodone-acetaminophen  mg per tablet 1 tablet Q4H PRN    influenza 65up-adj (QUADRIVALENT ADJUVANTED PF) vaccine 0.5 mL vaccine x 1 dose    insulin aspart U-100 pen 1-10 Units QID (AC + HS) PRN    meclizine tablet 25 mg TID PRN    metroNIDAZOLE tablet 500 mg Q8H    morphine injection 2 mg Q6H PRN    ondansetron injection 4 mg Q6H PRN    sodium bicarbonate tablet 1,300 mg BID    tacrolimus capsule 0.5 mg Daily PM    tacrolimus capsule 1 mg Daily AM    vancomycin SolR 125 mg Q6H       Objective:     Vital Signs (Most Recent):  Temp: 97.3 °F (36.3 °C) (04/11/23 0819)  Pulse: 78 (04/11/23 0819)  Resp: 18 (04/11/23 0819)  BP: (!) 115/56 (04/11/23 0819)  SpO2: 99 % (04/11/23 0819)   Vital Signs (24h  Range):  Temp:  [96.5 °F (35.8 °C)-97.9 °F (36.6 °C)] 97.3 °F (36.3 °C)  Pulse:  [67-78] 78  Resp:  [17-20] 18  SpO2:  [92 %-99 %] 99 %  BP: ()/(49-78) 115/56     Weight: 82.4 kg (181 lb 10.5 oz) (04/11/23 0043)  Body mass index is 25.34 kg/m².  Body surface area is 2.03 meters squared.    I/O last 3 completed shifts:  In: 240 [P.O.:240]  Out: 2100 [Urine:2100]    Physical Exam  Constitutional:       Appearance: Normal appearance.   HENT:      Head: Normocephalic and atraumatic.   Eyes:      General: No scleral icterus.     Extraocular Movements: Extraocular movements intact.      Pupils: Pupils are equal, round, and reactive to light.   Cardiovascular:      Rate and Rhythm: Normal rate and regular rhythm.   Pulmonary:      Effort: Pulmonary effort is normal.      Breath sounds: Normal breath sounds.   Musculoskeletal:      Right lower leg: No edema.      Left lower leg: No edema.   Skin:     General: Skin is warm and dry.   Neurological:      General: No focal deficit present.      Mental Status: He is alert and oriented to person, place, and time.   Psychiatric:         Mood and Affect: Mood normal.         Behavior: Behavior normal.       Significant Labs:sureBMP:   Recent Labs   Lab 04/11/23  0731   *      K 4.1      CO2 18*   BUN 19   CREATININE 1.6*   CALCIUM 8.6*   MG 1.8       CMP:   Recent Labs   Lab 04/05/23  1758 04/06/23  0412 04/11/23  0731   GLU 92   < > 128*   CALCIUM 9.0   < > 8.6*   ALBUMIN 2.6*  --   --    PROT 7.5  --   --    *   < > 136   K 4.5   < > 4.1   CO2 16*   < > 18*      < > 109   BUN 34*   < > 19   CREATININE 2.2*   < > 1.6*   ALKPHOS 164*  --   --    ALT 31  --   --    AST 46*  --   --    BILITOT 0.4  --   --     < > = values in this interval not displayed.       All labs within the past 24 hours have been reviewed.    Significant Imaging:  Labs: Reviewed      Assessment/Plan:     Active Diagnoses:    Diagnosis Date Noted POA    PRINCIPAL PROBLEM:   Septic shock [A41.9, R65.21] 2018 Yes    Bacteremia [R78.81] 2023 Yes    Clostridium difficile infection [A49.8] 2023 Yes    Osteomyelitis [M86.9] 11/10/2018 Yes    Immunosuppression [D84.9]  Yes    Type 2 diabetes mellitus with hyperglycemia, with long-term current use of insulin [E11.65, Z79.4]  Not Applicable     donor kidney transplant for DM 16, DINORAH on CKD [Z94.0]  Not Applicable     Chronic      Problems Resolved During this Admission:       Assessment and plan:    1. Kidney transplant status: Status post kidney transplant in 2016.  Baseline creatinine appears run between about 1.2 and 1.4.    2. DINORAH:  Creatinine has improved to 1.6 on most recent laboratory studies.  DINORAH is consistent with prerenal azotemia likely complicated by mild ATN.    3. Immunosuppression:  Prograf dose was recently adjusted.  He is currently on 1 mg twice a day.  This morning's Prograf level is higher than target at 8.4.  Will decrease to 1 mg in the morning and 0.5 mg in the evening.  Target Prograf level at about 5.    4. Electrolytes: Potassium is stable at 4.1.  Bicarbonate is slightly low at 18.       Thank you for your consult.     Jose David Hooker MD  Nephrology  [facility] - [facility] ([facility])

## 2023-04-11 NOTE — PLAN OF CARE
SEE EVAL FOR DETAILS. PT DISPLAYED DEFICITS WITH ADL'S , DEFICITS FUNCTIONAL MOBILITY/ TRANSFERS AND DECREASE B UE STRENGTH/ENDURANCE. RECOMMENDATION : HOME HEALTH O.T. VS SNF

## 2023-04-11 NOTE — PT/OT/SLP EVAL
Occupational Therapy Evaluation and Treatment    Name: Lavelle Ladd  MRN: [id number]  Admitting Diagnosis: Septic shock  Recent Surgery: * No surgery found *      Recommendations:     Discharge Recommendations: nursing facility, skilled  Level of Assistance Recommended: 24 hours supervision  Discharge Equipment Recommendations: none  Barriers to discharge: Decreased caregiver support    Assessment:     Lavelle Ladd is a 67 y.o. male with a medical diagnosis of Septic shock. He presents with performance deficits affecting function including weakness, impaired functional mobility, impaired endurance, gait instability, impaired balance, impaired self care skills, decreased lower extremity function, decreased upper extremity function, decreased safety awareness.     Rehab Prognosis: Fair; patient would benefit from acute OT services to address these deficits and reach maximum level of function.    Plan:     Patient to be seen 2 x/week to address the above listed problems via self-care/home management, therapeutic activities, therapeutic exercises  Plan of Care Expires:    Plan of Care Reviewed with: patient    Subjective     Chief Complaint: DEBILITY AND GENERALIZED   Patient Comments/Goals: RETURN HOME  Pain/Comfort:  Pain Rating 1: 6/10  Location - Side 1: Right  Location 1: leg    Patients cultural, spiritual, Denominational conflicts given the current situation:      Social History:  Living Environment: Patient lives with their significant other in a single story home with number of outside stair(s): 0  Prior Level of Function: Prior to admission, patient   Roles and Routines: Patient was not driving and not working prior to admission.  Equipment Used at Home: wheelchair, bedside commode, prosthesis, shower chair  Assistance Upon Discharge: significant other    Objective:     Communicated with NURSE AND Epic CHART REVIEW prior to session. Patient found HOB elevated with peripheral IV, telemetry upon OT entry to  room.    General Precautions: Standard, fall   Orthopedic Precautions:     Braces: N/A    Respiratory Status: Room air    Occupational Performance    Gait belt applied - Yes    Bed Mobility:   Rolling/Turning to Left with stand by assistance  Rolling/Turning to Right with stand by assistance  Scooting anteriorly to EOB to have both feet planted on floor: stand by assistance  Supine to sit from right side of bed with stand by assistance  Supine to sit from left side of bed with stand by assistance      Activities of Daily Living:  Upper Body Dressing: minimum assistance  Toileting: maximal assistance    Cognitive/Visual Perceptual:  Cognitive/Psychosocial Skills:    -     Oriented to: Person, Place, Time, Situation  -     Follows Commands/attention: Follows multistep  commands  -     Communication: clear/fluent  -     Memory: No Deficits noted  -     Safety awareness/insight to disability: impaired    Physical Exam:  Balance:    -     Sitting: stand by assistance  Upper Extremity Range of Motion:     -       Right Upper Extremity: WFL  -       Left Upper Extremity: WFL  Upper Extremity Strength:    -       Right Upper Extremity: MMT: 4/5 grossly  -       Left Upper Extremity: Deficits: mmt: 4/5 grossly   Strength:    -       Right Upper Extremity: WFL  -       Left Upper Extremity: WFL    AMPAC 6 Click ADL:  AMPAC Total Score: 17    Treatment & Education:  Therapist provided facilitation and instruction of proper body mechanics, energy conservation, and fall prevention strategies during tasks listed above  Patient educated on role of OT, POC, and goals for therapy  Patient educated on importance of OOB activities with staff member assistance and sitting OOB majority of the day      Patient not clear to transfer with RN/PCT.    Patient left HOB elevated with all lines intact, call button in reach, and RN notified.    GOALS:   Multidisciplinary Problems       Occupational Therapy Goals          Problem:  Occupational Therapy    Goal Priority Disciplines Outcome Interventions   Occupational Therapy Goal     OT, PT/OT     Description: O.T. GOALS TO BE MET BY 23  PT WILL TOLERATE 1 SET X 15 REPS B UE STRENGTH/ ENDURANCE EXERCISE  S WITH UE DRESSING  SBA WITH AE WITH TOILET TRANSFERS                         History:     Past Medical History:   Diagnosis Date    DINORAH (acute kidney injury) 2016    Arthritis     CAD in native artery 2019    CHF (congestive heart failure)     Chronic obstructive pulmonary disease 2016    Coronary artery disease involving native coronary artery of native heart without angina pectoris 2016    CRI (chronic renal insufficiency) 2019     donor kidney transplant for DM 16     Induction with Thymo x3 and IV solumedrol to total 875mg  Kidney Biopsy  2016: 16 glomeruli, ACR type 1 AVR type 2, significant microcirculatory changes, c4d negative, No DSA, 5 to10% fibrosis. Treated with thymo x8 2016- no rejection      Diastolic heart failure     Encounter for blood transfusion     ESRD on RRT since 10/2013 10/29/2013    Biopsy proven diabetic nephropathy and lymphoplasmacytic interstitial infiltrate not c/w with AIN (ddx sjogrens or assoc with tamm-horsefall protein extravasation)     GERD (gastroesophageal reflux disease)     History of hepatitis C, s/p successful Rx w/ SVR - 2017    Completed 12 weeks harvoni w/ SVR    Hyperlipidemia     Hypertension     PAD (peripheral artery disease) 2019    PIC line (peripherally inserted central catheter) flush     Prophylactic immunotherapy     Proteinuria     PVD (peripheral vascular disease) 2017    RLE BKA CT 16 Extensive atherosclerotic disease of the aorta and mesenteric arteries.     Renal hypertension     Type 2 diabetes mellitus with diabetic neuropathy, with long-term current use of insulin 2016    Vitamin B12 deficiency          Past Surgical History:   Procedure  Laterality Date    ANGIOGRAPHY OF LOWER EXTREMITY N/A 9/23/2022    Procedure: ANGIOGRAM, LOWER EXTREMITY/left leg;  Surgeon: Donal Mcdonald MD;  Location: [facility] CATH LAB;  Service: Cardiovascular;  Laterality: N/A;    ANGIOGRAPHY OF LOWER EXTREMITY N/A 9/29/2022    Procedure: ANGIOGRAM, LOWER EXTREMITY/left leg;  Surgeon: Donal Mcdonald MD;  Location: [facility] CATH LAB;  Service: Peripheral Vascular;  Laterality: N/A;  resched from 9/23 pt ready now    AORTOGRAPHY WITH SERIALOGRAPHY N/A 6/14/2018    Procedure: LEFT LEG ANGIOGRAM;  Surgeon: Donal Mcdonald MD;  Location: [facility] CATH LAB;  Service: Vascular;  Laterality: N/A;    APPLICATION OF LARGE EXTERNAL FIXATION DEVICE TO TIBIA Left 8/4/2022    Procedure: APPLICATION, EXTERNAL FIXATION DEVICE, LARGE, TIBIA;  Surgeon: Good Villa MD;  Location: [facility] OR;  Service: Orthopedics;  Laterality: Left;    av bovine graft      Left UE    AV FISTULA PLACEMENT      left UE    BELOW KNEE AMPUTATION OF LOWER EXTREMITY Left 10/6/2022    Procedure: AMPUTATION, BELOW KNEE;  Surgeon: Donal Mcdonald MD;  Location: [facility] ER;  Service: Vascular;  Laterality: Left;    CARDIAC CATHETERIZATION  02/2015    CLOSED REDUCTION OF INJURY OF TIBIA Left 8/4/2022    Procedure: CLOSED REDUCTION, TIBIA;  Surgeon: Good Villa MD;  Location: [facility] OR;  Service: Orthopedics;  Laterality: Left;  Closed reduction left tibial and fibular shaft fractures    CLOSURE OF WOUND Left 9/24/2018    Procedure: CLOSURE, WOUND;  Surgeon: Karla Wheeler DPM;  Location: [facility] OR;  Service: Podiatry;  Laterality: Left;  Secondary Wound closure, extensive    CLOSURE OF WOUND Left 11/5/2018    Procedure: CLOSURE, WOUND;  Surgeon: Karla Wheeler DPM;  Location: [facility] OR;  Service: Podiatry;  Laterality: Left;    DEBRIDEMENT OF MULTIPLE METATARSAL BONES Left 11/5/2018    Procedure: DEBRIDEMENT, METATARSAL BONE, 2 OR MORE BONES;  Surgeon: Karla Wheeler DPM;  Location: [facility] OR;  Service: Podiatry;  Laterality: Left;     EXCISION OF SKIN Left 9/27/2019    Procedure: EXCISION, SKIN;  Surgeon: Lenard Alarcon MD;  Location: [facility] OR 2ND FLR;  Service: Plastics;  Laterality: Left;  Plastics set, NIMS monitor, ACell    FOOT AMPUTATION THROUGH METATARSAL Left 9/21/2018    Procedure: AMPUTATION, FOOT, TRANSMETATARSAL;  Surgeon: Karla Wheeler DPM;  Location: [facility] OR;  Service: Podiatry;  Laterality: Left;    FOOT AMPUTATION THROUGH METATARSAL Left 10/31/2018    Procedure: AMPUTATION, FOOT, TRANSMETATARSAL;  Surgeon: Karla Wheeler DPM;  Location: [facility] OR;  Service: Podiatry;  Laterality: Left;    FOOT AMPUTATION THROUGH METATARSAL Left 11/5/2018    Procedure: AMPUTATION, FOOT, TRANSMETATARSAL;  Surgeon: Karla Wheeler DPM;  Location: [facility] OR;  Service: Podiatry;  Laterality: Left;  revisional transmetatarsal amputation, Left foot    IRRIGATION AND DEBRIDEMENT OF LOWER EXTREMITY Left 10/31/2018    Procedure: IRRIGATION AND DEBRIDEMENT, LOWER EXTREMITY;  Surgeon: aKrla Wheeler DPM;  Location: [facility] OR;  Service: Podiatry;  Laterality: Left;    KIDNEY TRANSPLANT  05/21/2016    LEFT HEART CATHETERIZATION Left 7/21/2019    Procedure: CATHETERIZATION, HEART, LEFT;  Surgeon: Andrew Valdez MD;  Location: [facility] CATH LAB;  Service: Cardiology;  Laterality: Left;    LEG AMPUTATION THROUGH KNEE  2011    right LE, started as nail puncture leading to diabetic ulcer    REMOVAL OF EXTERNAL FIXATION DEVICE Left 9/17/2022    Procedure: REMOVAL, EXTERNAL FIXATION DEVICE;  Surgeon: Kathleen Zazueta MD;  Location: [facility] OR;  Service: Orthopedics;  Laterality: Left;    SKIN FULL THICKNESS GRAFT Left 10/7/2019    Procedure: APPLICATION, GRAFT, SKIN, FULL-THICKNESS;  Surgeon: Lenard Alarcon MD;  Location: [facility] OR 2ND FLR;  Service: Plastics;  Laterality: Left;    SURGICAL REMOVAL OF LESION OF FACE Right 10/7/2019    Procedure: EXCISION, LESION, FACE;  Surgeon: Lenard Alarcon MD;  Location: [facility] OR 2ND FLR;  Service: Plastics;  Laterality:  Right;       Time Tracking:     OT Date of Treatment: 04/11/23  OT Start Time: 0750  OT Stop Time: 0815  OT Total Time (min): 25 min    Billable Minutes: Evaluation 15 minutes and Therapeutic Activity 10 minutes    4/11/2023

## 2023-04-11 NOTE — PHYSICIAN QUERY
PT Name: Lavelle Ladd  MR #: [id number]     DOCUMENTATION CLARIFICATION     CDS/: MARLYN Castro, RN, CDS              Contact information:[email or web address]   This form is a permanent document in the medical record.     Query Date: April 11, 2023    By submitting this query, we are merely seeking further clarification of documentation.  Please utilize your independent clinical judgment when addressing the question(s) below.    The Medical Record contains the following:   Indicators   Supporting Clinical Findings Location in Medical Record    Non-blanchable erythema/redness     X Ulcer/Injury/Skin Breakdown  Sacral wound present.    ED, Dr. German, 4/5    Deep Tissue Injury     X Wound care consult  Altered Skin Integrity: Sacral Spine  Date First Assessed/Time First Assessed: 04/05/23 2230   Altered Skin Integrity Present on Admission - Did Patient arrive to the hospital with altered skin?: yes  Location: Sacral spine     Description: Full thickness tissue loss. Subcutaneous fat may be visible but bone, tendon or muscle are not exposed      \    Wound care, 4/6   X Acute/Chronic Illness  PMHx of CAD, CHF, COPD, kidney transplant, HLD, HTN, PAD, PVD,  B/L LE BKA  and DM2      Septic shock Etiology = bacteremia , wound infection ,C,diff and osteo     HM, Dr. Hanna, 4/9   X Medication/Treatment  Cleansed with:;Sterile normal saline    Wound care, 4/6    Other       The clinical guidelines noted are only a system guideline. It does not replace the providers clinical judgment.    Per the National Pressure Injury Advisory Panel:   A pressure injury is localized damage to the skin and underlying soft tissue usually over a bony prominence or related to a medical or other device. The injury can present as intact skin or an open ulcer and may be painful. The injury occurs as a result of intense and/or prolonged pressure or pressure in combination with shear. The tolerance of soft tissue for pressure and shear  may also be affected by microclimate, nutrition, perfusion, co-morbidities and condition of the soft tissue.       Stage 1 Pressure Injury:  Intact skin with a localized area of non-blanchable erythema, which may appear differently in darkly pigmented skin. Color changes do not include purple or maroon discoloration; these may indicate deep tissue pressure injury.    Stage 2 Pressure Injury:  Partial-thickness loss of skin with exposed dermis. The wound bed is viable, pink or red, moist, and may also present as an intact or ruptured serum-filled blister.    Stage 3 Pressure Injury:  Full-thickness loss of skin, in which adipose (fat) is visible in the ulcer and granulation tissue and epibole (rolled wound edges) are often present. Slough and/or eschar may be visible. Undermining and tunneling may occur.    Stage 4 Pressure Injury:  Full-thickness skin and tissue loss with exposed or directly palpable fascia, muscle, tendon, ligament, cartilage or bone in the ulcer. Slough and/or eschar may be visible. Epibole (rolled edges), undermining and/or tunneling often occur.    Unstageable Pressure Injury:  Full-thickness skin and tissue loss in which the extent of tissue damage within the ulcer cannot be confirmed because it is obscured by slough or eschar. If slough or eschar is removed, a Stage 3 or Stage 4 pressure injury will be revealed.      Provider, please provide the integumentary diagnosis related to the documentation of Sacrum:     [   x] Pressure Injury/Decubitus Ulcer, Stage 3     [   ] Other Integumentary Diagnosis (please specify):______________     [  ] Clinically Undetermined             Please document in your progress notes daily for the duration of treatment until resolved and include in your discharge summary.    Reference:    SHERIE Connros., STEPHANIE Garcia., Goldberg, M., DAPHNEY Coy., DAPHNEY Up., & ANDREA Napier. (2016). Revised National Pressure Ulcer Advisory Panel Pressure Injury Staging System:  Revised Pressure Injury Staging System. J Wound Ostomy Continence Nurs, 43(6), 585-597. doi:10.1097/[email or web address]    Form No.[id number]

## 2023-04-11 NOTE — PT/OT/SLP EVAL
Physical Therapy Evaluation    Patient Name:  Lavelle Ladd   MRN:  [id number]    Recommendations:     Discharge Recommendations: nursing facility, skilled   Discharge Equipment Recommendations: none   Barriers to discharge: Decreased caregiver support    Assessment:     Lavelle Ladd is a 67 y.o. male admitted with a medical diagnosis of Septic shock.  He presents with the following impairments/functional limitations: weakness, impaired balance, impaired endurance, decreased lower extremity function, pain, impaired self care skills, impaired functional mobility, impaired skin, decreased safety awareness, impaired coordination, decreased ROM, decreased upper extremity function .    Rehab Prognosis: Good; patient would benefit from acute skilled PT services to address these deficits and reach maximum level of function.    Recent Surgery: * No surgery found *      Plan:     During this hospitalization, patient to be seen 3 x/week to address the identified rehab impairments via therapeutic activities, therapeutic exercises and progress toward the following goals:    Plan of Care Expires:  04/25/23    Subjective     Chief Complaint: PAIN R LE   Patient/Family Comments/goals: NONE STATED   Pain/Comfort:  Pain Rating 1: 6/10  Location - Side 1: Right  Location 1: leg  Pain Addressed 1: Cessation of Activity  Pain Rating Post-Intervention 1: 6/10    Patients cultural, spiritual, Taoist conflicts given the current situation:      Living Environment:  PT LIVES AT HOME WITH GIRLFRIEND IN A ONE STORY HOME WITH RAMP TO ENTER HOWEVER HAS BEEN IN HOSPITALS AND SNF FOR PAST 8 MONTHS   Prior to admission, patients level of function was MOD I WITH T/F TO WC.  Equipment used at home: wheelchair, bedside commode, prosthesis, shower chair.  DME owned (not currently used): rolling walker.  Upon discharge, patient will have assistance from NH STAFF.    Objective:     Communicated with NURSE MANDUJANO AND Psychiatric CHART REVIEW  prior to  "session.  Patient found supine with peripheral IV, telemetry  upon PT entry to room.    General Precautions: Standard, fall  Orthopedic Precautions:RLE non weight bearing   Braces: N/A  Respiratory Status: Room air    Exams:  RLE ROM: IMPAIRED  RLE Strength: NA D/T PAIN   LLE ROM: WFL  LLE Strength: WFL    Functional Mobility:  Bed Mobility:     Rolling Left:  stand by assistance  Rolling Right: stand by assistance  Scooting: stand by assistance  Supine to Sit: stand by assistance  Sit to Supine: stand by assistance      AM-PAC 6 CLICK MOBILITY  Total Score:21       Treatment & Education:  PT ROLLED AND P.T. ASSISTED IN CLEANING AND CHANGING SOILED PAD. PT SUP.SIT EOB WITH SBA AND HOB ELEVATED. PT SCOOTED TO RIGHT X 3 WITH EMPHASIS ON T/F TRAINING. PT LIMITED WITH KNEE EXT OF R LE WITH PAIN. PT EDUCATED ON ROLE OF P.T. AND ON SITTING FOR ALL MEALS TO INC UPRIGHT TOLERANCE. PT EDUCATED ON RISK FOR FALLS DUE TO GENERALIZED WEAKNESS, EDUCATED ON "CALL DON'T FALL", ENCOURAGED TO CALL FOR ASSISTANCE WITH ALL NEEDS SUCH AS BED<>CHAIR TRANSFERS.      Patient left HOB elevated with call button in reach and bed alarm on.    GOALS:   Multidisciplinary Problems       Physical Therapy Goals          Problem: Physical Therapy    Goal Priority Disciplines Outcome Goal Variances Interventions   Physical Therapy Goal     PT, PT/OT      Description: LT23  1. PT WILL COMPLETE BED MOBILITY IND  2. PT WILL SCOOT/ SLIDE BOARD T/F TO CHAIR WITH MIN A  3. PT WILL TOLERATE B LE TE X FOR STRENGTHENING.                        History:     Past Medical History:   Diagnosis Date    DINORAH (acute kidney injury) 2016    Arthritis     CAD in native artery 2019    CHF (congestive heart failure)     Chronic obstructive pulmonary disease 2016    Coronary artery disease involving native coronary artery of native heart without angina pectoris 2016    CRI (chronic renal insufficiency) 2019     donor kidney " transplant for DM 5/21/16     Induction with Thymo x3 and IV solumedrol to total 875mg  Kidney Biopsy  5/31/2016: 16 glomeruli, ACR type 1 AVR type 2, significant microcirculatory changes, c4d negative, No DSA, 5 to10% fibrosis. Treated with thymo x8 6/21/2016- no rejection      Diastolic heart failure     Encounter for blood transfusion     ESRD on RRT since 10/2013 10/29/2013    Biopsy proven diabetic nephropathy and lymphoplasmacytic interstitial infiltrate not c/w with AIN (ddx sjogrens or assoc with tamm-horsefall protein extravasation)     GERD (gastroesophageal reflux disease)     History of hepatitis C, s/p successful Rx w/ SVR12 - 4/2017 4/5/2017    Completed 12 weeks harvoni w/ SVR    Hyperlipidemia     Hypertension     PAD (peripheral artery disease) 7/21/2019    PIC line (peripherally inserted central catheter) flush     Prophylactic immunotherapy     Proteinuria     PVD (peripheral vascular disease) 6/26/2017    RLE BKA CT 12/11/16 Extensive atherosclerotic disease of the aorta and mesenteric arteries.     Renal hypertension     Type 2 diabetes mellitus with diabetic neuropathy, with long-term current use of insulin 12/1/2016    Vitamin B12 deficiency        Past Surgical History:   Procedure Laterality Date    ANGIOGRAPHY OF LOWER EXTREMITY N/A 9/23/2022    Procedure: ANGIOGRAM, LOWER EXTREMITY/left leg;  Surgeon: Donal Mcdonald MD;  Location: [facility] CATH LAB;  Service: Cardiovascular;  Laterality: N/A;    ANGIOGRAPHY OF LOWER EXTREMITY N/A 9/29/2022    Procedure: ANGIOGRAM, LOWER EXTREMITY/left leg;  Surgeon: Donal Mcdonald MD;  Location: [facility] CATH LAB;  Service: Peripheral Vascular;  Laterality: N/A;  resched from 9/23 pt ready now    AORTOGRAPHY WITH SERIALOGRAPHY N/A 6/14/2018    Procedure: LEFT LEG ANGIOGRAM;  Surgeon: Donal Mcdonald MD;  Location: [facility] CATH LAB;  Service: Vascular;  Laterality: N/A;    APPLICATION OF LARGE EXTERNAL FIXATION DEVICE TO TIBIA Left 8/4/2022    Procedure: APPLICATION,  EXTERNAL FIXATION DEVICE, LARGE, TIBIA;  Surgeon: Good Villa MD;  Location: [facility] OR;  Service: Orthopedics;  Laterality: Left;    av bovine graft      Left UE    AV FISTULA PLACEMENT      left UE    BELOW KNEE AMPUTATION OF LOWER EXTREMITY Left 10/6/2022    Procedure: AMPUTATION, BELOW KNEE;  Surgeon: Donal Mcdonald MD;  Location: [facility];  Service: Vascular;  Laterality: Left;    CARDIAC CATHETERIZATION  02/2015    CLOSED REDUCTION OF INJURY OF TIBIA Left 8/4/2022    Procedure: CLOSED REDUCTION, TIBIA;  Surgeon: Good Villa MD;  Location: [facility] OR;  Service: Orthopedics;  Laterality: Left;  Closed reduction left tibial and fibular shaft fractures    CLOSURE OF WOUND Left 9/24/2018    Procedure: CLOSURE, WOUND;  Surgeon: Karla Wheeler DPM;  Location: [facility] OR;  Service: Podiatry;  Laterality: Left;  Secondary Wound closure, extensive    CLOSURE OF WOUND Left 11/5/2018    Procedure: CLOSURE, WOUND;  Surgeon: Karla Wheeler DPM;  Location: [facility] OR;  Service: Podiatry;  Laterality: Left;    DEBRIDEMENT OF MULTIPLE METATARSAL BONES Left 11/5/2018    Procedure: DEBRIDEMENT, METATARSAL BONE, 2 OR MORE BONES;  Surgeon: Karla Wheeler DPM;  Location: [facility] OR;  Service: Podiatry;  Laterality: Left;    EXCISION OF SKIN Left 9/27/2019    Procedure: EXCISION, SKIN;  Surgeon: Lenard Alarcon MD;  Location: [address];  Service: Plastics;  Laterality: Left;  Plastics set, NIMS monitor, ACell    FOOT AMPUTATION THROUGH METATARSAL Left 9/21/2018    Procedure: AMPUTATION, FOOT, TRANSMETATARSAL;  Surgeon: Karla Wheeler DPM;  Location: [facility] OR;  Service: Podiatry;  Laterality: Left;    FOOT AMPUTATION THROUGH METATARSAL Left 10/31/2018    Procedure: AMPUTATION, FOOT, TRANSMETATARSAL;  Surgeon: Karla Wheeler DPM;  Location: [facility];  Service: Podiatry;  Laterality: Left;    FOOT AMPUTATION THROUGH METATARSAL Left 11/5/2018    Procedure: AMPUTATION, FOOT, TRANSMETATARSAL;  Surgeon: Karla WOODY  LASHONDA Wheeler;  Location: [facility] OR;  Service: Podiatry;  Laterality: Left;  revisional transmetatarsal amputation, Left foot    IRRIGATION AND DEBRIDEMENT OF LOWER EXTREMITY Left 10/31/2018    Procedure: IRRIGATION AND DEBRIDEMENT, LOWER EXTREMITY;  Surgeon: Karla Wheeler DPM;  Location: [facility] OR;  Service: Podiatry;  Laterality: Left;    KIDNEY TRANSPLANT  05/21/2016    LEFT HEART CATHETERIZATION Left 7/21/2019    Procedure: CATHETERIZATION, HEART, LEFT;  Surgeon: Andrew Valdez MD;  Location: [facility] CATH LAB;  Service: Cardiology;  Laterality: Left;    LEG AMPUTATION THROUGH KNEE  2011    right LE, started as nail puncture leading to diabetic ulcer    REMOVAL OF EXTERNAL FIXATION DEVICE Left 9/17/2022    Procedure: REMOVAL, EXTERNAL FIXATION DEVICE;  Surgeon: Kathleen Zazueta MD;  Location: [facility] OR;  Service: Orthopedics;  Laterality: Left;    SKIN FULL THICKNESS GRAFT Left 10/7/2019    Procedure: APPLICATION, GRAFT, SKIN, FULL-THICKNESS;  Surgeon: Lenard Alarcon MD;  Location: [address];  Service: Plastics;  Laterality: Left;    SURGICAL REMOVAL OF LESION OF FACE Right 10/7/2019    Procedure: EXCISION, LESION, FACE;  Surgeon: Lenard Alarcon MD;  [facility];  Service: Plastics;  Laterality: Right;       Time Tracking:     PT Received On: 04/11/23  PT Start Time: 0755     PT Stop Time: 0820  PT Total Time (min): 25 min     Billable Minutes: Evaluation 15 and Therapeutic Activity 10      04/11/2023

## 2023-04-11 NOTE — PLAN OF CARE
Pt remained free of injury. Call bell and personal items within reach. VSS. Mild pain control with around the clock dosing. Glucose monitoring continued, coverage given as needed. Pt refuses skin assessments until he has a BM.  Chart check complete.     Problem: Adult Inpatient Plan of Care  Goal: Plan of Care Review  Outcome: Ongoing, Progressing     Problem: Adult Inpatient Plan of Care  Goal: Absence of Hospital-Acquired Illness or Injury  Outcome: Ongoing, Progressing     Problem: Adult Inpatient Plan of Care  Goal: Optimal Comfort and Wellbeing  Outcome: Ongoing, Progressing     Problem: Bleeding (Sepsis/Septic Shock)  Goal: Absence of Bleeding  Outcome: Ongoing, Progressing

## 2023-04-11 NOTE — CONSULTS
[facility] Medicine  Telemedicine Consult Note       Lavelle Ladd has been accepted for transfer to [facility] and will be followed through telemedicine services beginning 04/11/23 at 7 AM.    Jessica Gloria MD  [facility] Medicine Staff

## 2023-04-11 NOTE — PROCEDURES
"Lavelle Ladd is a 67 y.o. male patient.    Temp: 97.3 °F (36.3 °C) (04/11/23 0819)  Pulse: 72 (04/11/23 1140)  Resp: 18 (04/11/23 1140)  BP: 136/61 (04/11/23 1140)  SpO2: 96 % (04/11/23 1140)  Weight: 82.4 kg (181 lb 10.5 oz) (04/11/23 0043)  Height: 5' 11" (180.3 cm) (04/09/23 1925)    PICC  Date/Time: 4/11/2023 1:40 PM  Performed by: Rashi Giraldo RN  Consent Done: Yes  Time out: Immediately prior to procedure a time out was called to verify the correct patient, procedure, equipment, support staff and site/side marked as required  Indications: med administration  Anesthesia: local infiltration  Local anesthetic: lidocaine 1% without epinephrine  Anesthetic Total (mL): 2  Preparation: skin prepped with chlorhexidine (without alcohol)  Skin prep agent dried: skin prep agent completely dried prior to procedure  Sterile barriers: all five maximum sterile barriers used - cap, mask, sterile gown, sterile gloves, and large sterile sheet  Hand hygiene: hand hygiene performed prior to central venous catheter insertion  Location details: right brachial  Catheter type: double lumen  Catheter size: 4 Fr  Catheter Length: 35cm    Ultrasound guidance: yes  Vessel Caliber: medium and patent, compressibility normal  Needle advanced into vessel with real time Ultrasound guidance.  Guidewire confirmed in vessel.  Sterile sheath used.  Number of attempts: 1  Post-procedure: blood return through all ports, chlorhexidine patch and sterile dressing applied  Specimens: No  Implants: No  Comments: Awaiting xray for confirmation of placement         Rashi Giraldo RN  4/11/2023    "

## 2023-04-11 NOTE — PLAN OF CARE
[facility]    HOME HEALTH ORDERS  FACE TO FACE ENCOUNTER    Patient Name: Lavelle Ladd  YOB: 1955    PCP: Primary Doctor No   PCP Address: None  PCP Phone Number: None  PCP Fax: None    Encounter Date: 2023    Admit to Home Health    Diagnoses:  Active Hospital Problems    Diagnosis  POA    *Septic shock [A41.9, R65.21]  Yes    Bacteremia [R78.81]  Yes    Clostridium difficile infection [A49.8]  Yes    Osteomyelitis [M86.9]  Yes    Immunosuppression [D84.9]  Yes    Type 2 diabetes mellitus with hyperglycemia, with long-term current use of insulin [E11.65, Z79.4]  Not Applicable     donor kidney transplant for DM 16, DINORAH on CKD [Z94.0]  Not Applicable     Chronic     Induction with Thymo x3 and IV solumedrol to total 875mg    Kidney Biopsy   2016: 16 glomeruli, ACR type 1 AVR type 2, significant microcirculatory changes, c4d negative, No DSA, 5 to10% fibrosis. Treated with thymo x8 (5 full, 3 half doses)  2016: insufficient sample since it had no glomeruli; C4d was negative in peritubular capillaries but insufficient tissue to comment on presence of rejection  8/15/16: 25 glomeruli, moderate microcirculatory inflammation, positive C4d (20-30%), interstitial infiltrate with tubulitis but <5% thus not meeting diagnostic criteria for ACR but suspicious for ACR, no AVR, CNI toxicity, ~10% interstitial fibrosis, 7 foci of calcium phos crystals. Rx SM pulsex3 in BR -16 Endothelial cell crossmatch against target cells EC1 and EC2 negative, no DILIA antibodies detected. AT1R also negative. At present time no change in management. (resulted )            Resolved Hospital Problems   No resolved problems to display.       No future appointments.        I have seen and examined this patient face to face today. My clinical findings that support the need for the home health skilled services and home bound status are the following:  Weakness/numbness causing  balance and gait disturbance due to Infection and Weakness/Debility making it taxing to leave home.    Allergies:Review of patient's allergies indicates:  No Known Allergies    Diet: cardiac diet and diabetic diet: 2000 calorie    Activities: activity as tolerated    Nursing:   SN to complete comprehensive assessment including routine vital signs. Instruct on disease process and s/s of complications to report to MD. Review/verify medication list sent home with the patient at time of discharge  and instruct patient/caregiver as needed. Frequency may be adjusted depending on start of care date.    Notify MD if SBP > 160 or < 90; DBP > 90 or < 50; HR > 120 or < 50; Temp > 101;       CONSULTS:    Physical Therapy to evaluate and treat. Evaluate for home safety and equipment needs; Establish/upgrade home exercise program. Perform / instruct on therapeutic exercises, gait training, transfer training, and Range of Motion.  Occupational Therapy to evaluate and treat. Evaluate home environment for safety and equipment needs. Perform/Instruct on transfers, ADL training, ROM, and therapeutic exercises.  Aide to provide assistance with personal care, ADLs, and vital signs.    MISCELLANEOUS CARE:  Home Infusion Therapy:   SN to perform Infusion Therapy/Central Line Care.  Review Central Line Care & Central Line Flush with patient.    Administer (drug and dose): cefepime 2 g IV BID    Last dose given: 4/13/23                         Home dose due: 4/13/23    Scrub the Hub: Prior to accessing the line, always perform a 30 second alcohol scrub  Each lumen of the central line is to be flushed at least daily with 10 mL Normal Saline and 3 mL Heparin flush (10 units/mL)  Skilled Nurse (SN) may draw blood from IV access  Blood Draw Procedure:   - Aspirate at least 5 mL of blood   - Discard   - Obtain specimen   - Change injection cap   - Flush with 20 mL Normal Saline followed by a                 3-5 mL Heparin flush (10  units/mL)  Central :   - Sterile dressing changes are done weekly and as needed.   - Use chlor-hexadine scrub to cleanse site, apply Biopatch to insertion site,       apply securement device dressing   - Injection caps are changed weekly and after EVERY lab draw.   - If sterile gauze is under dressing to control oozing,                 dressing change must be performed every 24 hours until gauze is not needed.    Routine Skin for Bedridden Patients: Instruct patient/caregiver to apply moisture barrier cream to all skin folds and wet areas in perineal area daily and after baths and all bowel movements.    WOUND CARE ORDERS  Use vashe (at bedside) to clean wounds to stump, then place moist vashe gauze to wound and cover with foam dressing daily  Change foam dressing once a week        Medications: Review discharge medications with patient and family and provide education.      Current Discharge Medication List        START taking these medications    Details   dextrose 5 % in water (D5W) 5 % PgBk 50 mL with ceFEPIme 2 gram SolR 2 g Inject 2 g into the vein every 12 (twelve) hours.      metroNIDAZOLE (FLAGYL) 500 MG tablet Take 1 tablet (500 mg total) by mouth every 8 (eight) hours.  Qty: 90 tablet, Refills: 1      vancomycin (FIRVANQ) 25 mg/mL SolR Take 5 mLs (125 mg total) by mouth every 6 (six) hours. for 4 days  Qty: 80 mL, Refills: 0           CONTINUE these medications which have NOT CHANGED    Details   acetaminophen (TYLENOL) 500 MG tablet Take 2 tablets (1,000 mg total) by mouth 3 (three) times daily.  Refills: 0      amLODIPine (NORVASC) 10 MG tablet Take 10 mg by mouth once daily.      ascorbic acid, vitamin C, (VITAMIN C) 500 MG tablet Take 1 tablet by mouth every morning.      aspirin (ECOTRIN) 81 MG EC tablet Take 1 tablet (81 mg total) by mouth once daily.  Qty: 90 tablet, Refills: 1      atorvastatin (LIPITOR) 80 MG tablet Take 80 mg by mouth once daily.      carvediloL (COREG) 3.125 MG  tablet Take 3.125 mg by mouth 2 (two) times a day.      cholecalciferol, vitamin D3, 125 mcg (5,000 unit) Tab Take 5,000 Units by mouth every Monday.      gabapentin (NEURONTIN) 300 MG capsule Take 300 mg by mouth 3 (three) times daily.      HUMULIN R REGULAR U-100 INSULN 100 unit/mL injection Inject  into the skin 2 (two) times daily before meals. If blood sugar 200-250= 4 units, 251-300= 6 units, 301-350= 8 units, 351-400= 10 units, 401- 450= 12 units, >450= 14 units and call MD.      insulin detemir U-100 (LEVEMIR FLEXTOUCH) 100 unit/mL (3 mL) SubQ InPn pen Inject 12 Units into the skin 2 (two) times daily.  Qty: 7.2 mL, Refills: 11      levothyroxine (SYNTHROID) 125 MCG tablet Take 125 mcg by mouth once daily.      meloxicam (MOBIC) 7.5 MG tablet Take 7.5 mg by mouth once daily.      multivitamin Tab Take 1 tablet by mouth once daily.      mycophenolate (CELLCEPT) 250 mg Cap Take 250 mg by mouth 2 (two) times daily.      nitroGLYCERIN (NITROSTAT) 0.4 MG SL tablet Place 1 tablet (0.4 mg total) under the tongue every 5 (five) minutes as needed.  Qty: 30 tablet, Refills: 0      ondansetron (ZOFRAN) 4 MG tablet Take 4 mg by mouth every 8 (eight) hours as needed for Nausea.      semaglutide (OZEMPIC) 1 mg/dose (2 mg/1.5 mL) PnIj Inject 1 mg under the skin every 7 days.  Qty: 6 Syringe, Refills: 3    Associated Diagnoses: Type 2 diabetes mellitus with hyperglycemia, with long-term current use of insulin      sertraline (ZOLOFT) 25 MG tablet Take 25 mg by mouth once daily.      tacrolimus (PROGRAF) 0.5 MG Cap Take 2 capsules (1 mg total) by mouth every 12 (twelve) hours.  Qty: 180 capsule, Refills: 11      traMADoL (ULTRAM) 50 mg tablet Take 50 mg by mouth every 6 (six) hours as needed.             I certify that this patient is confined to his home and needs intermittent skilled nursing care, physical therapy and occupational therapy.    Jessica Gloria MD  4/11/2023 2:42 PM  Department of [facility] medicine

## 2023-04-11 NOTE — PLAN OF CARE
04/11/23 1448   Post-Acute Status   Post-Acute Authorization Home Health;IV Infusion   Home Health Status Set-up Complete/Auth obtained   IV Infusion Status Referral(s) sent   Discharge Delays None known at this time   Discharge Plan   Discharge Plan A Home Health     Referral sent to [facility] ; pending acceptance.     Norm sent referral to [facility] and notified Ingrid of referral. Ingrid to begin working on benefits on MD places final ID rec orders.     [facility] unable to accept pt due to specific [facility] Plan.

## 2023-04-12 NOTE — PLAN OF CARE
Patient remains free from injury this shift. Safety precautions maintained. No s/s of acute distress noted. Pain controlled per MD order.Cardiac monitoring in place. Blood glucose monitoring continued this shift per orders. Pt refused bed alarm. Chart and orders review complete. Patient education about care complete.       Problem: Adult Inpatient Plan of Care  Goal: Plan of Care Review  Outcome: Ongoing, Progressing  Goal: Patient-Specific Goal (Individualized)  Outcome: Ongoing, Progressing  Goal: Absence of Hospital-Acquired Illness or Injury  Outcome: Ongoing, Progressing  Goal: Optimal Comfort and Wellbeing  Outcome: Ongoing, Progressing  Goal: Readiness for Transition of Care  Outcome: Ongoing, Progressing     Problem: Diabetes Comorbidity  Goal: Blood Glucose Level Within Targeted Range  Outcome: Ongoing, Progressing     Problem: Adjustment to Illness (Sepsis/Septic Shock)  Goal: Optimal Coping  Outcome: Ongoing, Progressing

## 2023-04-12 NOTE — PROGRESS NOTES
[facility] Medicine  Telemedicine Progress Note    Patient Name: Lavelle Ladd  MRN: [id number]  Patient Class: IP- Inpatient   Admission Date: 4/5/2023  Length of Stay: 7 days  Attending Physician: Jessica Gloria MD  Primary Care Provider: Primary Doctor No          Subjective:     Principal Problem:Septic shock        HPI:  68 y/o WM with a PMHx of CAD, CHF, COPD, kidney transplant, HLD, HTN, PAD, PVD,  B/L LE BKA  and DM2 Presents to ED on 4/5 with intractable vomiting. Was at [facility] with plan for d/c  but noted vomiting, fever, cough, body aches, diarrhea.ED evaluation revealed foul smelling drainage from rt stump wound.X ray concerning with osseous and soft tissue changes suspicious for osteo.Labs - wbc 10.5; lactate 2.7; procal 7; creatinine 2.2 (baseline 1.2) blood cultures obtained.Treated with 2.5L IVF (plus 500ml from EMS), tylenol, vanco, rocephin.Refractory hypotension requiring pressor support and admitted to ICU. ID and nephrology cosnulted . Blood cx (+) for strep and proteus  .star Wound cx (+) for GNR. Cdiff PCR positive . He is on IV vanc , rocephin , po vanc   and po metronidazole .   Pt has been   admitted multiple times since 8 /4/22  in multiples facility including  [facility], [facility] , [facility]  and  [place] .    IM consulted to assume care .             Overview/Hospital Course:  Admitted for septic shock s/t bacteremia, osteomyelitis, LE wounds, and Cdiff colitis. Continue IV abx per cultures and wound care. Pt clinically improving since admission, consult  for postacute placement      Interval History: Patient doing well today and overall feels better. Has some pain over stump site. Has been eating and drinking well. Pt denies any nausea, vomiting, diarrhea, constipation, fever, chills, malaise, chest pain, SOB. HH orders placed. Continue abx. PICC line ordered      Review of Systems   Constitutional:  Negative for fever.   Respiratory:  Negative for cough  and shortness of breath.    Cardiovascular:  Negative for chest pain.   Gastrointestinal:  Negative for abdominal pain.   Musculoskeletal:  Positive for arthralgias.   Neurological:  Negative for dizziness and headaches.   Psychiatric/Behavioral:  Negative for confusion.    All other systems reviewed and are negative.  Objective:     Vital Signs (Most Recent):  Temp: 98.7 °F (37.1 °C) (04/11/23 1945)  Pulse: 74 (04/11/23 1945)  Resp: 18 (04/11/23 2106)  BP: 118/64 (04/11/23 1945)  SpO2: 96 % (04/11/23 1945) Vital Signs (24h Range):  Temp:  [96.5 °F (35.8 °C)-98.7 °F (37.1 °C)] 98.7 °F (37.1 °C)  Pulse:  [67-78] 74  Resp:  [17-20] 18  SpO2:  [92 %-99 %] 96 %  BP: ()/(49-64) 118/64     Weight: 82.4 kg (181 lb 10.5 oz)  Body mass index is 25.34 kg/m².    Intake/Output Summary (Last 24 hours) at 4/12/2023 0027  Last data filed at 4/11/2023 2110  Gross per 24 hour   Intake 1195.02 ml   Output 1300 ml   Net -104.98 ml      Physical Exam  Vitals and nursing note reviewed.   Constitutional:       Appearance: Normal appearance.   HENT:      Head: Normocephalic and atraumatic.   Eyes:      Extraocular Movements: Extraocular movements intact.      Pupils: Pupils are equal, round, and reactive to light.   Cardiovascular:      Rate and Rhythm: Normal rate and regular rhythm.   Pulmonary:      Effort: Pulmonary effort is normal. No respiratory distress.   Abdominal:      General: Abdomen is flat. There is no distension.   Musculoskeletal:         General: Normal range of motion.      Cervical back: Normal range of motion.   Neurological:      General: No focal deficit present.      Mental Status: He is alert and oriented to person, place, and time.   Psychiatric:         Mood and Affect: Mood normal.         Behavior: Behavior normal.       Significant Labs: All pertinent labs within the past 24 hours have been reviewed.  CBC:   Recent Labs   Lab 04/10/23  0637 04/11/23  0900   WBC 5.02 7.15   HGB 9.0* 9.0*   HCT 29.7*  29.9*    244     CMP:   Recent Labs   Lab 04/10/23  0637 04/11/23  0731    136   K 4.2 4.1    109   CO2 19* 18*   * 128*   BUN 22 19   CREATININE 1.8* 1.6*   CALCIUM 8.7 8.6*   ANIONGAP 10 9       Significant Imaging: I have reviewed all pertinent imaging results/findings within the past 24 hours.      Assessment/Plan:      * Septic shock  S/p vasopressor  Etiology = bacteremia , wound infection ,C,diff and osteo   Cont IVAB   ID on board       Bacteremia  Microbiology Results (last 7 days)     Procedure Component Value Units Date/Time    Culture, Anaerobe [[id number]] Collected: 04/06/23 1707    Order Status: Completed Specimen: Wound from Leg, Right Updated: 04/08/23 1357     Anaerobic Culture Culture in progress    Narrative:      Right stump wound    Blood culture x two cultures. Draw prior to antibiotics. [[id number]]  (Abnormal)  (Susceptibility) Collected: 04/05/23 1757    Order Status: Completed Specimen: Blood from Peripheral, Antecubital, Right Updated: 04/08/23 1019     Blood Culture, Routine Gram stain susan bottle: Gram negative rods, Gram positive cocci      Results called to and read back by: Ashley Nails RN 04/06/2023  14:48      Gram stain aer bottle: Gram positive cocci and Gram negative rods      Positive results previously called 04/06/2023      STREPTOCOCCUS AGALACTIAE (GROUP B)  Susceptibility pending  Beta-hemolytic streptococci are routinely susceptible to   penicillins,cephalosporins and carbapenems.        PROTEUS MIRABILIS    Narrative:      Aerobic and anaerobic    Blood culture x two cultures. Draw prior to antibiotics. [[id number]]  (Abnormal) Collected: 04/05/23 1945    Order Status: Completed Specimen: Blood from Peripheral, Wrist, Right Updated: 04/08/23 1019     Blood Culture, Routine Gram stain susan bottle: Gram negative rods      Results called to and read back by: Ashley Nails RN 04/06/2023 14:48      Gram stain aer bottle: Gram negative rods      Positive  results previously called 04/07/2023  20:26      PROTEUS MIRABILIS  For susceptibility see order #[id number]      Narrative:      Aerobic and anaerobic    Aerobic culture [[id number]]  (Abnormal) Collected: 04/06/23 1707    Order Status: Completed Specimen: Wound from Leg, Right Updated: 04/08/23 0738     Aerobic Bacterial Culture GRAM NEGATIVE MIGUEL ANGEL  Moderate  Identification and susceptibility pending  Skin skylar also present      Narrative:      Right stump wound    Urine culture [[id number]] Collected: 04/06/23 0940    Order Status: Completed Specimen: Urine Updated: 04/1953     Urine Culture, Routine No growth    Narrative:      Specimen Source->Urine    C Diff Toxin by PCR [[id number]]  (Abnormal) Collected: 04/06/23 1145    Order Status: Completed Updated: 04/07/23 0520     C. diff PCR Positive    Clostridium difficile EIA [[id number]]  (Abnormal) Collected: 04/06/23 1145    Order Status: Completed Specimen: Stool Updated: 04/06/23 1621     C. diff Antigen Positive     C difficile Toxins A+B, EIA Negative     Comment: Testing not recommended for children <24 months old.       Rapid Organism ID by PCR (from Blood culture) [[id number]]  (Abnormal) Collected: 04/05/23 1757    Order Status: Completed Updated: 04/06/23 1524     Enterococcus faecalis Detected     Enterococcus faecium Not Detected     Listeria Monocytogenes Not Detected     Staphylococcus spp. Not Detected     Staphylococcus aureus Not Detected     Staphylococcus epidermidis Not Detected     Staphylococcus lugdunensis Not Detected     Streptococcus species See species for ID     Streptococcus agalactiae Detected     Streptococcus pneumoniae Not Detected     Streptococcus pyogenes Not Detected     Acinetobacter calcoaceticus/baumannii complex Not Detected     Bacteroides fragilis Not Detected     Enterobacerales See species for ID     Enterobacter cloacae complex Not Detected     Escherichia Not Detected     Klebsiella aerogenes Not Detected      Klebsiella oxytoca Not Detected     Klebsiella pneumoniae group Not Detected     Proteus Detected     Salmonella sp Not Detected     Serratia marcescens Not Detected     Haemophilus influenzae Not Detected     Neisseria meningtidis Not Detected     Pseudomonas aeruginosa Not Detected     Stenotrophomonas maltophilia Not Detected     Candida albicans Not Detected     Candida auris Not Detected     Candida glabrata Not Detected     Candida krusei Not Detected     Candida parapsilosis Not Detected     Candida tropicalis Not Detected     Cryptococcus neoformans/gattii Not Detected     CTX-M (ESBL ) Not Detected     IMP (Carbapenem resistant) Not Detected     KPC resistance gene (Carbapenem resistant) Not Detected     mcr-1  Test Not Applicable     mec A/C  Test Not Applicable     mec A/C and MREJ (MRSA) gene Test Not Applicable     NDM (Carbapenem resistant) Not Detected     OXA-48-like (Carbapenem resistant) Not Detected     van A/B (VRE gene) Not Detected     VIM (Carbapenem resistant) Not Detected    Narrative:      Aerobic and anaerobic    Culture, Anaerobe [[id number]] Collected: 04/05/23 2249    Order Status: Sent Specimen: Wound from Leg, Right Updated: 04/06/23 0224          ID on board  Cont IVAB     Clostridium difficile infection  Cont isolation   Cont po vanc       Osteomyelitis  Cont IVAB  Right stump MRI  Findings concerning for early osteomyelitis of the stump of the tibia about the wound margin. Soft tissue irregularities concerning for component of infection as above.   ID on board. Will need 6 weeks of IV abx  PICC line ordered, ok by nephro        Immunosuppression  Cont current tx       Type 2 diabetes mellitus with hyperglycemia, with long-term current use of insulin  Patient's FSGs are controlled on current medication regimen.  Last A1c reviewed-   Lab Results   Component Value Date    HGBA1C 6.7 (H) 04/05/2023     Most recent fingerstick glucose reviewed-   Recent Labs   Lab 04/07/23  6483  23  2141 23  0803 23  1118   POCTGLUCOSE 184* 171* 106 228*     Current correctional scale  Medium  Maintain anti-hyperglycemic dose as follows-   Antihyperglycemics (From admission, onward)    Start     Stop Route Frequency Ordered    23 0823  insulin aspart U-100 pen 1-10 Units         -- SubQ Before meals & nightly PRN 23 0724        Hold Oral hypoglycemics while patient is in the hospital.     donor kidney transplant for DM 16, DINORAH on CKD  Nephrology on board          VTE Risk Mitigation (From admission, onward)         Ordered     heparin (porcine) injection 5,000 Units  Every 8 hours         23                      I have completed this tele-visit without the assistance of a telepresenter.    The attending portion of this evaluation, treatment, and documentation was performed per Jessica Gloria MD via Telemedicine AudioVisual using the secure tuul software platform with 2 way audio/video. The provider was located off-site and the patient is located in the hospital. The aforementioned video software was utilized to document the relevant history and physical exam    Jessica Gloria MD  Department of Hospital Medicine   [facility] Med S[facility]

## 2023-04-12 NOTE — ASSESSMENT & PLAN NOTE
Cont IVAB  Right stump MRI  Findings concerning for early osteomyelitis of the stump of the tibia about the wound margin. Soft tissue irregularities concerning for component of infection as above.   ID on board. Will need 6 weeks of IV abx  PICC line placed, ok by nephro    pending  set up

## 2023-04-12 NOTE — PLAN OF CARE
[facility]    12:30 pm Referral received this am for cefepime 2 GM IV q 12 hours. Patient's cell phone is not working [phone]. Called into room to inform what to expect while on home IV antibiotics. Patient stated to call back while cell phone charges.     1:30 pm Called patient back in the room due to cell phone not charged. Patient's sister answered the phone. Gave her my cell phone to call me back once cell phone is fully charged to educate via face time.     4:30 pm Called patient in room. Patient stated cell phone is not close to him and is unable to reach it. Patient gave full permission to call his sister Kecia [phone] as she will be helping patient with administration. Called Kecia and discussed infusion process over phone. Emailed her on-line video. Will have liaison follow up tomorrow to make sure she is comfortable with administration process.     [facility]  Nena Jauregui Rn, Clinical Educator  Cell [phone]  Office [phone]  Fax [phone]

## 2023-04-12 NOTE — ASSESSMENT & PLAN NOTE
Cont IVAB  Right stump MRI  Findings concerning for early osteomyelitis of the stump of the tibia about the wound margin. Soft tissue irregularities concerning for component of infection as above.   ID on board. Will need 6 weeks of IV abx  PICC line ordered, ok by nephro

## 2023-04-12 NOTE — SUBJECTIVE & OBJECTIVE
Interval History: Patient doing well today and overall feels better. Has some pain over stump site. Has been eating and drinking well. Pt denies any nausea, vomiting, diarrhea, constipation, fever, chills, malaise, chest pain, SOB. HH orders placed. Continue abx. PICC line ordered      Review of Systems   Constitutional:  Negative for fever.   Respiratory:  Negative for cough and shortness of breath.    Cardiovascular:  Negative for chest pain.   Gastrointestinal:  Negative for abdominal pain.   Musculoskeletal:  Positive for arthralgias.   Neurological:  Negative for dizziness and headaches.   Psychiatric/Behavioral:  Negative for confusion.    All other systems reviewed and are negative.  Objective:     Vital Signs (Most Recent):  Temp: 98.7 °F (37.1 °C) (04/11/23 1945)  Pulse: 74 (04/11/23 1945)  Resp: 18 (04/11/23 2106)  BP: 118/64 (04/11/23 1945)  SpO2: 96 % (04/11/23 1945) Vital Signs (24h Range):  Temp:  [96.5 °F (35.8 °C)-98.7 °F (37.1 °C)] 98.7 °F (37.1 °C)  Pulse:  [67-78] 74  Resp:  [17-20] 18  SpO2:  [92 %-99 %] 96 %  BP: ()/(49-64) 118/64     Weight: 82.4 kg (181 lb 10.5 oz)  Body mass index is 25.34 kg/m².    Intake/Output Summary (Last 24 hours) at 4/12/2023 0027  Last data filed at 4/11/2023 2110  Gross per 24 hour   Intake 1195.02 ml   Output 1300 ml   Net -104.98 ml      Physical Exam  Vitals and nursing note reviewed.   Constitutional:       Appearance: Normal appearance.   HENT:      Head: Normocephalic and atraumatic.   Eyes:      Extraocular Movements: Extraocular movements intact.      Pupils: Pupils are equal, round, and reactive to light.   Cardiovascular:      Rate and Rhythm: Normal rate and regular rhythm.   Pulmonary:      Effort: Pulmonary effort is normal. No respiratory distress.   Abdominal:      General: Abdomen is flat. There is no distension.   Musculoskeletal:         General: Normal range of motion.      Cervical back: Normal range of motion.   Neurological:      General:  No focal deficit present.      Mental Status: He is alert and oriented to person, place, and time.   Psychiatric:         Mood and Affect: Mood normal.         Behavior: Behavior normal.       Significant Labs: All pertinent labs within the past 24 hours have been reviewed.  CBC:   Recent Labs   Lab 04/10/23  0637 04/11/23  0900   WBC 5.02 7.15   HGB 9.0* 9.0*   HCT 29.7* 29.9*    244     CMP:   Recent Labs   Lab 04/10/23  0637 04/11/23  0731    136   K 4.2 4.1    109   CO2 19* 18*   * 128*   BUN 22 19   CREATININE 1.8* 1.6*   CALCIUM 8.7 8.6*   ANIONGAP 10 9       Significant Imaging: I have reviewed all pertinent imaging results/findings within the past 24 hours.

## 2023-04-12 NOTE — PLAN OF CARE
04/12/23 0914   Post-Acute Status   Post-Acute Authorization IV Infusion   IV Infusion Status Referral(s) sent     NORM sent referral to [facility].  Norm spoke with Nena at [facility] who stated they are running benefits and will do an face time teaching for pt.  3:44pm NORM spoke with Nena who stated she called pt's sister who stated pt's cell phone was dead. She also stated she called pt's room who also stated his phone was dead to complete the face time teaching. Nena will call pt once he ws charged his cell phone.

## 2023-04-12 NOTE — PROGRESS NOTES
[facility] Medicine  Telemedicine Progress Note    Patient Name: Lavelle Ladd  MRN: [id number]  Patient Class: IP- Inpatient   Admission Date: 4/5/2023  Length of Stay: 7 days  Attending Physician: Jessica Gloria MD  Primary Care Provider: Primary Doctor No          Subjective:     Principal Problem:Septic shock        HPI:  68 y/o WM with a PMHx of CAD, CHF, COPD, kidney transplant, HLD, HTN, PAD, PVD,  B/L LE BKA  and DM2 Presents to ED on 4/5 with intractable vomiting. Was at [facility] with plan for d/c  but noted vomiting, fever, cough, body aches, diarrhea.ED evaluation revealed foul smelling drainage from rt stump wound.X ray concerning with osseous and soft tissue changes suspicious for osteo.Labs - wbc 10.5; lactate 2.7; procal 7; creatinine 2.2 (baseline 1.2) blood cultures obtained.Treated with 2.5L IVF (plus 500ml from EMS), tylenol, vanco, rocephin.Refractory hypotension requiring pressor support and admitted to ICU. ID and nephrology cosnulted . Blood cx (+) for strep and proteus  .star Wound cx (+) for GNR. Cdiff PCR positive . He is on IV vanc , rocephin , po vanc   and po metronidazole .   Pt has been   admitted multiple times since 8 /4/22  in multiples facility including  [facility], [facility] , [facility]  and  [facility] .    IM consulted to assume care .           Overview/Hospital Course:  Admitted for septic shock s/t bacteremia, osteomyelitis, LE wounds, and Cdiff colitis. Continue IV abx per cultures and wound care. Per ID, will need 6 weeks of cefepime and flagyl. PICC line placed. Pt clinically improving since admission, consult  for postacute placement. Pt prefers to DC home with HH. Nephrology following for DINORAH.       Subjective: doing well overall. His questions were answered.     Review of Systems   Constitutional:  Negative for fever.   Respiratory:  Negative for cough and shortness of breath.    Cardiovascular:  Negative for chest pain.   Gastrointestinal:   Negative for abdominal pain.   Musculoskeletal:  Positive for arthralgias.   Neurological:  Negative for dizziness and headaches.   Psychiatric/Behavioral:  Negative for confusion.    All other systems reviewed and are negative.  Objective:      Vital Signs (Most Recent):  Temp: 98.7 °F (37.1 °C) (04/11/23 1945)  Pulse: 74 (04/11/23 1945)  Resp: 18 (04/11/23 2106)  BP: 118/64 (04/11/23 1945)  SpO2: 96 % (04/11/23 1945) Vital Signs (24h Range):  Temp:  [96.5 °F (35.8 °C)-98.7 °F (37.1 °C)] 98.7 °F (37.1 °C)  Pulse:  [67-78] 74  Resp:  [17-20] 18  SpO2:  [92 %-99 %] 96 %  BP: ()/(49-64) 118/64      Weight: 82.4 kg (181 lb 10.5 oz)  Body mass index is 25.34 kg/m².     Intake/Output Summary (Last 24 hours) at 4/12/2023 0027  Last data filed at 4/11/2023 2110      Gross per 24 hour   Intake 1195.02 ml   Output 1300 ml   Net -104.98 ml      Physical Exam  Vitals and nursing note reviewed.   Constitutional:       Appearance: Normal appearance.   HENT:      Head: Normocephalic and atraumatic.   Eyes:      Extraocular Movements: Extraocular movements intact.      Pupils: Pupils are equal, round, and reactive to light.   Cardiovascular:      Rate and Rhythm: Normal rate and regular rhythm.   Pulmonary:      Effort: Pulmonary effort is normal. No respiratory distress.   Abdominal:      General: Abdomen is flat. There is no distension.   Musculoskeletal:         General: Normal range of motion.      Cervical back: Normal range of motion.   Neurological:      General: No focal deficit present.      Mental Status: He is alert and oriented to person, place, and time.   Psychiatric:         Mood and Affect: Mood normal.         Behavior: Behavior normal.        Assessment/Plan:      * Septic shock  S/p vasopressor  Etiology = bacteremia , wound infection ,C,diff and osteo   Cont IVAB - on cefepime and po flagyl   ID on board       Bacteremia  Microbiology Results (last 7 days)       Procedure Component Value Units Date/Time     Culture, Anaerobe [[id number]] Collected: 04/06/23 1707    Order Status: Completed Specimen: Wound from Leg, Right Updated: 04/11/23 1058     Anaerobic Culture No anaerobes isolated    Narrative:      Right stump wound    Culture, Anaerobe [[id number]]  (Abnormal) Collected: 04/05/23 2245    Order Status: Completed Specimen: Wound from Leg, Right Updated: 04/10/23 1501     Anaerobic Culture PORPHYROMONAS SOMERAE  Many      Narrative:      Rt BKA stump wound    Aerobic culture [[id number]]  (Abnormal)  (Susceptibility) Collected: 04/06/23 1707    Order Status: Completed Specimen: Wound from Leg, Right Updated: 04/10/23 1038     Aerobic Bacterial Culture PSEUDOMONAS AERUGINOSA  Moderate        PROVIDENCIA STUARTII  Moderate  Skin skylar also present      Narrative:      Right stump wound    Blood culture x two cultures. Draw prior to antibiotics. [[id number]]  (Abnormal)  (Susceptibility) Collected: 04/05/23 1757    Order Status: Completed Specimen: Blood from Peripheral, Antecubital, Right Updated: 04/09/23 1128     Blood Culture, Routine Gram stain susan bottle: Gram negative rods, Gram positive cocci      Results called to and read back by: Ashley Nails RN 04/06/2023  14:48      Gram stain aer bottle: Gram positive cocci and Gram negative rods      Positive results previously called 04/06/2023      STREPTOCOCCUS AGALACTIAE (GROUP B)  Beta-hemolytic streptococci are routinely susceptible to   penicillins,cephalosporins and carbapenems.        PROTEUS MIRABILIS    Narrative:      Aerobic and anaerobic    Blood culture x two cultures. Draw prior to antibiotics. [[id number]]  (Abnormal) Collected: 04/05/23 1945    Order Status: Completed Specimen: Blood from Peripheral, Wrist, Right Updated: 04/08/23 1019     Blood Culture, Routine Gram stain susan bottle: Gram negative rods      Results called to and read back by: Ashley Nails RN 04/06/2023 14:48      Gram stain aer bottle: Gram negative rods      Positive results  previously called 04/07/2023  20:26      PROTEUS MIRABILIS  For susceptibility see order #[id number]      Narrative:      Aerobic and anaerobic    Urine culture [[id number]] Collected: 04/06/23 0940    Order Status: Completed Specimen: Urine Updated: 04/1953     Urine Culture, Routine No growth    Narrative:      Specimen Source->Urine    C Diff Toxin by PCR [[id number]]  (Abnormal) Collected: 04/06/23 1145    Order Status: Completed Updated: 04/07/23 0520     C. diff PCR Positive    Clostridium difficile EIA [[id number]]  (Abnormal) Collected: 04/06/23 1145    Order Status: Completed Specimen: Stool Updated: 04/06/23 1621     C. diff Antigen Positive     C difficile Toxins A+B, EIA Negative     Comment: Testing not recommended for children <24 months old.       Rapid Organism ID by PCR (from Blood culture) [[id number]]  (Abnormal) Collected: 04/05/23 1757    Order Status: Completed Updated: 04/06/23 1524     Enterococcus faecalis Detected     Enterococcus faecium Not Detected     Listeria Monocytogenes Not Detected     Staphylococcus spp. Not Detected     Staphylococcus aureus Not Detected     Staphylococcus epidermidis Not Detected     Staphylococcus lugdunensis Not Detected     Streptococcus species See species for ID     Streptococcus agalactiae Detected     Streptococcus pneumoniae Not Detected     Streptococcus pyogenes Not Detected     Acinetobacter calcoaceticus/baumannii complex Not Detected     Bacteroides fragilis Not Detected     Enterobacerales See species for ID     Enterobacter cloacae complex Not Detected     Escherichia Not Detected     Klebsiella aerogenes Not Detected     Klebsiella oxytoca Not Detected     Klebsiella pneumoniae group Not Detected     Proteus Detected     Salmonella sp Not Detected     Serratia marcescens Not Detected     Haemophilus influenzae Not Detected     Neisseria meningtidis Not Detected     Pseudomonas aeruginosa Not Detected     Stenotrophomonas maltophilia Not  Detected     Candida albicans Not Detected     Candida auris Not Detected     Candida glabrata Not Detected     Candida krusei Not Detected     Candida parapsilosis Not Detected     Candida tropicalis Not Detected     Cryptococcus neoformans/gattii Not Detected     CTX-M (ESBL ) Not Detected     IMP (Carbapenem resistant) Not Detected     KPC resistance gene (Carbapenem resistant) Not Detected     mcr-1  Test Not Applicable     mec A/C  Test Not Applicable     mec A/C and MREJ (MRSA) gene Test Not Applicable     NDM (Carbapenem resistant) Not Detected     OXA-48-like (Carbapenem resistant) Not Detected     van A/B (VRE gene) Not Detected     VIM (Carbapenem resistant) Not Detected    Narrative:      Aerobic and anaerobic            ID on board  Cont IVAB - on cefepime and po flagyl     Clostridium difficile infection  Cont isolation   Cont po vanc       Osteomyelitis  Cont IVAB  Right stump MRI  Findings concerning for early osteomyelitis of the stump of the tibia about the wound margin. Soft tissue irregularities concerning for component of infection as above.   ID on board. Will need 6 weeks of IV abx. on cefepime and po flagyl   PICC line placed, ok by nephro    pending HH set up     Immunosuppression  Cont current tx       Type 2 diabetes mellitus with hyperglycemia, with long-term current use of insulin  Patient's FSGs are controlled on current medication regimen.  Last A1c reviewed-   Lab Results   Component Value Date    HGBA1C 6.7 (H) 04/05/2023     Most recent fingerstick glucose reviewed-   Recent Labs   Lab 04/07/23  1727 04/07/23  2141 04/08/23  0803 04/08/23  1118   POCTGLUCOSE 184* 171* 106 228*     Current correctional scale  Medium  Maintain anti-hyperglycemic dose as follows-   Antihyperglycemics (From admission, onward)      Start     Stop Route Frequency Ordered    04/07/23 0823  insulin aspart U-100 pen 1-10 Units         -- SubQ Before meals & nightly PRN 04/07/23 0724          Hold Oral  hypoglycemics while patient is in the hospital.     donor kidney transplant for DM 16, DINORAH on CKD  Nephrology on board          VTE Risk Mitigation (From admission, onward)           Ordered     heparin (porcine) injection 5,000 Units  Every 8 hours         23                          I have completed this tele-visit with the assistance of a telepresenter.    The attending portion of this evaluation, treatment, and documentation was performed per Jessica Gloria MD via Telemedicine AudioVisual using the secure [employer] software platform with 2 way audio/video. The provider was located off-site and the patient is located in the hospital. The aforementioned video software was utilized to document the relevant history and physical exam    Jessica Gloria MD  Department of Hospital Medicine   [facility] - Med Surg

## 2023-04-12 NOTE — ASSESSMENT & PLAN NOTE
Microbiology Results (last 7 days)     Procedure Component Value Units Date/Time    Culture, Anaerobe [[id number]] Collected: 04/06/23 1707    Order Status: Completed Specimen: Wound from Leg, Right Updated: 04/11/23 1058     Anaerobic Culture No anaerobes isolated    Narrative:      Right stump wound    Culture, Anaerobe [[id number]]  (Abnormal) Collected: 04/05/23 2245    Order Status: Completed Specimen: Wound from Leg, Right Updated: 04/10/23 1501     Anaerobic Culture PORPHYROMONAS SOMERAE  Many      Narrative:      Rt BKA stump wound    Aerobic culture [[id number]]  (Abnormal)  (Susceptibility) Collected: 04/06/23 1707    Order Status: Completed Specimen: Wound from Leg, Right Updated: 04/10/23 1038     Aerobic Bacterial Culture PSEUDOMONAS AERUGINOSA  Moderate        PROVIDENCIA STUARTII  Moderate  Skin skylar also present      Narrative:      Right stump wound    Blood culture x two cultures. Draw prior to antibiotics. [[id number]]  (Abnormal)  (Susceptibility) Collected: 04/05/23 1757    Order Status: Completed Specimen: Blood from Peripheral, Antecubital, Right Updated: 04/09/23 1128     Blood Culture, Routine Gram stain susan bottle: Gram negative rods, Gram positive cocci      Results called to and read back by: Ashley Nails RN 04/06/2023  14:48      Gram stain aer bottle: Gram positive cocci and Gram negative rods      Positive results previously called 04/06/2023      STREPTOCOCCUS AGALACTIAE (GROUP B)  Beta-hemolytic streptococci are routinely susceptible to   penicillins,cephalosporins and carbapenems.        PROTEUS MIRABILIS    Narrative:      Aerobic and anaerobic    Blood culture x two cultures. Draw prior to antibiotics. [[id number]]  (Abnormal) Collected: 04/05/23 1945    Order Status: Completed Specimen: Blood from Peripheral, Wrist, Right Updated: 04/08/23 1019     Blood Culture, Routine Gram stain susan bottle: Gram negative rods      Results called to and read back by: Ashley Nails RN 04/06/2023  14:48      Gram stain aer bottle: Gram negative rods      Positive results previously called 04/07/2023  20:26      PROTEUS MIRABILIS  For susceptibility see order #[id number]      Narrative:      Aerobic and anaerobic    Urine culture [[id number]] Collected: 04/06/23 0940    Order Status: Completed Specimen: Urine Updated: 04/1953     Urine Culture, Routine No growth    Narrative:      Specimen Source->Urine    C Diff Toxin by PCR [[id number]]  (Abnormal) Collected: 04/06/23 1145    Order Status: Completed Updated: 04/07/23 0520     C. diff PCR Positive    Clostridium difficile EIA [[id number]]  (Abnormal) Collected: 04/06/23 1145    Order Status: Completed Specimen: Stool Updated: 04/06/23 1621     C. diff Antigen Positive     C difficile Toxins A+B, EIA Negative     Comment: Testing not recommended for children <24 months old.       Rapid Organism ID by PCR (from Blood culture) [[id number]]  (Abnormal) Collected: 04/05/23 1757    Order Status: Completed Updated: 04/06/23 1524     Enterococcus faecalis Detected     Enterococcus faecium Not Detected     Listeria Monocytogenes Not Detected     Staphylococcus spp. Not Detected     Staphylococcus aureus Not Detected     Staphylococcus epidermidis Not Detected     Staphylococcus lugdunensis Not Detected     Streptococcus species See species for ID     Streptococcus agalactiae Detected     Streptococcus pneumoniae Not Detected     Streptococcus pyogenes Not Detected     Acinetobacter calcoaceticus/baumannii complex Not Detected     Bacteroides fragilis Not Detected     Enterobacerales See species for ID     Enterobacter cloacae complex Not Detected     Escherichia Not Detected     Klebsiella aerogenes Not Detected     Klebsiella oxytoca Not Detected     Klebsiella pneumoniae group Not Detected     Proteus Detected     Salmonella sp Not Detected     Serratia marcescens Not Detected     Haemophilus influenzae Not Detected     Neisseria meningtidis Not Detected      Pseudomonas aeruginosa Not Detected     Stenotrophomonas maltophilia Not Detected     Candida albicans Not Detected     Candida auris Not Detected     Candida glabrata Not Detected     Candida krusei Not Detected     Candida parapsilosis Not Detected     Candida tropicalis Not Detected     Cryptococcus neoformans/gattii Not Detected     CTX-M (ESBL ) Not Detected     IMP (Carbapenem resistant) Not Detected     KPC resistance gene (Carbapenem resistant) Not Detected     mcr-1  Test Not Applicable     mec A/C  Test Not Applicable     mec A/C and MREJ (MRSA) gene Test Not Applicable     NDM (Carbapenem resistant) Not Detected     OXA-48-like (Carbapenem resistant) Not Detected     van A/B (VRE gene) Not Detected     VIM (Carbapenem resistant) Not Detected    Narrative:      Aerobic and anaerobic        ID on board  Cont IVAB - on cefepime and po flagyl

## 2023-04-12 NOTE — PT/OT/SLP PROGRESS
Occupational Therapy  Treatment    Lavelle Ladd   MRN: [id number]   Admitting Diagnosis: Septic shock    OT Date of Treatment: 04/12/23   OT Start Time: 1115  OT Stop Time: 1140  OT Total Time (min): 25 min    Billable Minutes:  Therapeutic Activity 15 and Therapeutic Exercise 10    OT/MIGUEL: OT          General Precautions: Standard, fall  Orthopedic Precautions: RLE non weight bearing  Braces: N/A  Respiratory Status: Room air         Subjective:  Communicated with nurse and epic chart review prior to session.    Pain/Comfort  Pain Rating 1: 7/10  Location - Side 1: Right  Location - Orientation 1:  (at wound site)  Location 1: leg    Objective:  Patient found with: peripheral IV     Functional Mobility:  Bed Mobility:  Sba with rolling walker and rolling l<>r  Sba with supine<>sit transfer    Activities of Daily Living:  LE: (S)  for set up and retrieval of prothesis     Balance:   Static Sit: GOOD-: Takes MODERATE challenges from all directions but inconsistently  Dynamic Sit: GOOD-: Incosistently Maintains balance through MODERATE excursions of active trunk movement,     Therapeutic Activities and Exercises:  Patient educated on role of OT in acute setting and benefits of participation. Educated on techniques to use to increase independence and decrease fall risk with bed mobility and sitting eob activity throughout the day.  Educated on importance of sitting EOB for all meals and direct impact to increase CV system as well as core strengthening/endurance and calling for A to transfer back to bed. Pt educated on hep. Pt performed 1 set  x 15 reps b ue rom exercise with 2 lb dowel seated EOB. Pt encouraged completion of B UE AROM therex throughout the day to tolerance to increase functional strength and activity tolerance. Patient stated understanding and in agreement with POC.      AM-PAC 6 CLICK ADL   How much help from another person does this patient currently need?   1 = Unable, Total/Dependent Assistance  2  "= A lot, Maximum/Moderate Assistance  3 = A little, Minimum/Contact Guard/Supervision  4 = None, Modified [place]/Independent    Putting on and taking off regular lower body clothing? : 3  Bathing (including washing, rinsing, drying)?: 3  Toileting, which includes using toilet, bedpan, or urinal? : 2  Putting on and taking off regular upper body clothing?: 3  Taking care of personal grooming such as brushing teeth?: 4  Eating meals?: 4  Daily Activity Total Score: 19     AM-PAC Raw Score CMS "G-Code Modifier Level of Impairment Assistance   6 % Total / Unable   7 - 8 CM 80 - 100% Maximal Assist   9-13 CL 60 - 80% Moderate Assist   14 - 19 CK 40 - 60% Moderate Assist   20 - 22 CJ 20 - 40% Minimal Assist   23 CI 1-20% SBA / CGA   24 CH 0% Independent/ Mod I       Patient left supine with all lines intact, call button in reach, and nurse notified    ASSESSMENT:  Lavelle Ladd is a 67 y.o. male with a medical diagnosis of Septic shock and presents with debility and generalized weakness    Rehab identified problem list/impairments:  weakness, impaired functional mobility, impaired endurance, gait instability, impaired balance, impaired self care skills, decreased safety awareness, pain    Rehab potential is good.    Activity tolerance: Good    Discharge recommendations: nursing facility, skilled, home health OT   Barriers to discharge: Barriers to Discharge: Decreased caregiver support    Equipment recommendations: none    GOALS:   Multidisciplinary Problems       Occupational Therapy Goals          Problem: Occupational Therapy    Goal Priority Disciplines Outcome Interventions   Occupational Therapy Goal     OT, PT/OT Ongoing, Progressing    Description: O.T. GOALS TO BE MET BY 4-25-23  PT WILL TOLERATE 1 SET X 15 REPS B UE STRENGTH/ ENDURANCE EXERCISE  S WITH UE DRESSING  SBA WITH AE WITH TOILET TRANSFERS                         Plan:  Patient to be seen 2 x/week to address the above listed problems via " self-care/home management, therapeutic activities, therapeutic exercises  Plan of Care expires:    Plan of Care reviewed with: patient         04/12/2023

## 2023-04-12 NOTE — ASSESSMENT & PLAN NOTE
S/p vasopressor  Etiology = bacteremia , wound infection ,C,diff and osteo   Cont IVAB - on cefepime and po flagyl   ID on board   Resolved

## 2023-04-12 NOTE — PROGRESS NOTES
[facility] - Intensive Care ([facility])  Nephrology  Progress Note    Patient Name: Lavelle Ladd  MRN: [id number]  Admission Date: 4/5/2023  Hospital Length of Stay: 7 days  Attending Provider: Jessica Gloria MD   Primary Care Physician: Primary Doctor No  Principal Problem:Septic shock    Consults  Subjective:     Interval History:  Patient was seen in his hospital room.  In bed resting comfortably.  No acute distress noted.      Review of systems:  No new complaints from overnight.  No shortness of breath or chest discomfort.  No fevers or chills.  No nausea vomiting.  No swelling.      Review of patient's allergies indicates:  No Known Allergies  Current Facility-Administered Medications   Medication Frequency    0.9%  NaCl infusion PRN    acetaminophen tablet 650 mg Q6H PRN    ceFEPIme (MAXIPIME) 2 g in dextrose 5 % in water (D5W) 5 % 50 mL IVPB (MB+) Q12H    dextrose 10% bolus 125 mL 125 mL PRN    dextrose 10% bolus 250 mL 250 mL PRN    famotidine tablet 20 mg Daily    gabapentin capsule 300 mg TID    glucagon (human recombinant) injection 1 mg PRN    heparin (porcine) injection 5,000 Units Q8H    HYDROcodone-acetaminophen  mg per tablet 1 tablet Q4H PRN    influenza 65up-adj (QUADRIVALENT ADJUVANTED PF) vaccine 0.5 mL vaccine x 1 dose    insulin aspart U-100 pen 1-10 Units QID (AC + HS) PRN    meclizine tablet 25 mg TID PRN    metroNIDAZOLE tablet 500 mg Q8H    morphine injection 2 mg Q6H PRN    ondansetron injection 4 mg Q6H PRN    sodium bicarbonate tablet 1,300 mg BID    tacrolimus capsule 0.5 mg Daily PM    tacrolimus capsule 1 mg Daily AM    vancomycin SolR 125 mg Q6H       Objective:     Vital Signs (Most Recent):  Temp: 98.2 °F (36.8 °C) (04/12/23 0732)  Pulse: 72 (04/12/23 0912)  Resp: 14 (04/12/23 0732)  BP: (!) 117/49 (04/12/23 0732)  SpO2: 95 % (04/12/23 0732)   Vital Signs (24h Range):  Temp:  [97.8 °F (36.6 °C)-98.7 °F (37.1 °C)] 98.2 °F (36.8 °C)  Pulse:  [68-78] 72  Resp:  [14-18] 14  SpO2:   [95 %-96 %] 95 %  BP: (117-136)/(49-64) 117/49     Weight: 82.4 kg (181 lb 10.5 oz) (04/11/23 0043)  Body mass index is 25.34 kg/m².  Body surface area is 2.03 meters squared.    I/O last 3 completed shifts:  In: 1195 [P.O.:1000; IV Piggyback:195]  Out: 2300 [Urine:2300]    Physical Exam  Constitutional:       Appearance: Normal appearance.   HENT:      Head: Normocephalic and atraumatic.   Eyes:      General: No scleral icterus.     Extraocular Movements: Extraocular movements intact.      Pupils: Pupils are equal, round, and reactive to light.   Cardiovascular:      Rate and Rhythm: Normal rate and regular rhythm.   Pulmonary:      Effort: Pulmonary effort is normal.      Breath sounds: Normal breath sounds.   Musculoskeletal:      Right lower leg: No edema.      Left lower leg: No edema.   Skin:     General: Skin is warm and dry.   Neurological:      General: No focal deficit present.      Mental Status: He is alert and oriented to person, place, and time.   Psychiatric:         Mood and Affect: Mood normal.         Behavior: Behavior normal.       Significant Labs:sureBMP:   Recent Labs   Lab 04/12/23  0517   *   *   K 4.3      CO2 22*   BUN 19   CREATININE 1.8*   CALCIUM 8.7   MG 1.7       CMP:   Recent Labs   Lab 04/05/23  1758 04/06/23  0412 04/12/23  0517   GLU 92   < > 151*   CALCIUM 9.0   < > 8.7   ALBUMIN 2.6*  --   --    PROT 7.5  --   --    *   < > 133*   K 4.5   < > 4.3   CO2 16*   < > 22*      < > 105   BUN 34*   < > 19   CREATININE 2.2*   < > 1.8*   ALKPHOS 164*  --   --    ALT 31  --   --    AST 46*  --   --    BILITOT 0.4  --   --     < > = values in this interval not displayed.       All labs within the past 24 hours have been reviewed.    Significant Imaging:  Labs: Reviewed      Assessment/Plan:     Active Diagnoses:    Diagnosis Date Noted POA    PRINCIPAL PROBLEM:  Septic shock [A41.9, R65.21] 09/18/2018 Yes    Bacteremia [R78.81] 04/08/2023 Yes    Clostridium  difficile infection [A49.8] 2023 Yes    Osteomyelitis [M86.9] 11/10/2018 Yes    Immunosuppression [D84.9]  Yes    Type 2 diabetes mellitus with hyperglycemia, with long-term current use of insulin [E11.65, Z79.4]  Not Applicable     donor kidney transplant for DM 16, DINORAH on CKD [Z94.0]  Not Applicable     Chronic      Problems Resolved During this Admission:       Assessment and plan:    1. Kidney transplant status: Status post kidney transplant in .  Baseline creatinine appears run between about 1.2 and 1.4.    2. DINORAH:  Serum creatinine is running relatively stable between 1.6 and 1.8.  Most recent was 1.8.    3. Immunosuppression:  Prograf was adjusted yesterday decreasing to 1 mg in the morning and 0.5 in the evening.  Will check a Prograf level tomorrow morning.  Targeting serum Prograf levels around 5.    4. Electrolytes: Potassium is stable at 4.3.  Bicarbonate is slightly low at 22.         Thank you for your consult.     Jose David Hooker MD  Nephrology  [facility] - Intensive [facility] ([facility])

## 2023-04-12 NOTE — SUBJECTIVE & OBJECTIVE
Interval History: Patient doing well today and overall feels better. PICC line placed. Continue abx. Nephrology following. Will need HH set up prior to DC.     Review of Systems   Constitutional:  Negative for fever.   Respiratory:  Negative for cough and shortness of breath.    Cardiovascular:  Negative for chest pain.   Gastrointestinal:  Negative for abdominal pain.   Musculoskeletal:  Positive for arthralgias.   Neurological:  Negative for dizziness and headaches.   Psychiatric/Behavioral:  Negative for confusion.    All other systems reviewed and are negative.  Objective:     Vital Signs (Most Recent):  Temp: 97 °F (36.1 °C) (04/12/23 1201)  Pulse: 76 (04/12/23 1328)  Resp: 16 (04/12/23 1427)  BP: (!) 122/54 (04/12/23 1201)  SpO2: 98 % (04/12/23 1201) Vital Signs (24h Range):  Temp:  [97 °F (36.1 °C)-98.7 °F (37.1 °C)] 97 °F (36.1 °C)  Pulse:  [68-78] 76  Resp:  [14-18] 16  SpO2:  [95 %-98 %] 98 %  BP: (117-122)/(49-64) 122/54     Weight: 82.4 kg (181 lb 10.5 oz)  Body mass index is 25.34 kg/m².    Intake/Output Summary (Last 24 hours) at 4/12/2023 1516  Last data filed at 4/12/2023 1200  Gross per 24 hour   Intake 1155.02 ml   Output 1850 ml   Net -694.98 ml        Physical Exam  Vitals and nursing note reviewed.   Constitutional:       Appearance: Normal appearance.   HENT:      Head: Normocephalic and atraumatic.   Eyes:      Extraocular Movements: Extraocular movements intact.      Pupils: Pupils are equal, round, and reactive to light.   Cardiovascular:      Rate and Rhythm: Normal rate and regular rhythm.   Pulmonary:      Effort: Pulmonary effort is normal. No respiratory distress.   Abdominal:      General: Abdomen is flat. There is no distension.   Musculoskeletal:         General: Normal range of motion.      Cervical back: Normal range of motion.      Comments: BLE amputations    Neurological:      General: No focal deficit present.      Mental Status: He is alert and oriented to person, place, and  time.   Psychiatric:         Mood and Affect: Mood normal.         Behavior: Behavior normal.       Significant Labs: All pertinent labs within the past 24 hours have been reviewed.  CBC:   Recent Labs   Lab 04/11/23  0900 04/12/23  0548   WBC 7.15 6.83   HGB 9.0* 8.7*   HCT 29.9* 28.7*    245       CMP:   Recent Labs   Lab 04/11/23  0731 04/12/23  0517    133*   K 4.1 4.3    105   CO2 18* 22*   * 151*   BUN 19 19   CREATININE 1.6* 1.8*   CALCIUM 8.6* 8.7   ANIONGAP 9 6*         Significant Imaging: I have reviewed all pertinent imaging results/findings within the past 24 hours.

## 2023-04-12 NOTE — ASSESSMENT & PLAN NOTE
Cont IVAB  Right stump MRI  Findings concerning for early osteomyelitis of the stump of the tibia about the wound margin. Soft tissue irregularities concerning for component of infection as above.   ID on board. Will need 6 weeks of IV abx. on cefepime and po flagyl   PICC line placed, ok by nephro    pending HH set up

## 2023-04-12 NOTE — ASSESSMENT & PLAN NOTE
Microbiology Results (last 7 days)     Procedure Component Value Units Date/Time    Culture, Anaerobe [[id number]] Collected: 04/06/23 1707    Order Status: Completed Specimen: Wound from Leg, Right Updated: 04/11/23 1058     Anaerobic Culture No anaerobes isolated    Narrative:      Right stump wound    Culture, Anaerobe [[id number]]  (Abnormal) Collected: 04/05/23 2245    Order Status: Completed Specimen: Wound from Leg, Right Updated: 04/10/23 1501     Anaerobic Culture PORPHYROMONAS SOMERAE  Many      Narrative:      Rt BKA stump wound    Aerobic culture [[id number]]  (Abnormal)  (Susceptibility) Collected: 04/06/23 1707    Order Status: Completed Specimen: Wound from Leg, Right Updated: 04/10/23 1038     Aerobic Bacterial Culture PSEUDOMONAS AERUGINOSA  Moderate        PROVIDENCIA STUARTII  Moderate  Skin skylar also present      Narrative:      Right stump wound    Blood culture x two cultures. Draw prior to antibiotics. [[id number]]  (Abnormal)  (Susceptibility) Collected: 04/05/23 1757    Order Status: Completed Specimen: Blood from Peripheral, Antecubital, Right Updated: 04/09/23 1128     Blood Culture, Routine Gram stain susan bottle: Gram negative rods, Gram positive cocci      Results called to and read back by: Ashley Nails RN 04/06/2023  14:48      Gram stain aer bottle: Gram positive cocci and Gram negative rods      Positive results previously called 04/06/2023      STREPTOCOCCUS AGALACTIAE (GROUP B)  Beta-hemolytic streptococci are routinely susceptible to   penicillins,cephalosporins and carbapenems.        PROTEUS MIRABILIS    Narrative:      Aerobic and anaerobic    Blood culture x two cultures. Draw prior to antibiotics. [[id number]]  (Abnormal) Collected: 04/05/23 1945    Order Status: Completed Specimen: Blood from Peripheral, Wrist, Right Updated: 04/08/23 1019     Blood Culture, Routine Gram stain susan bottle: Gram negative rods      Results called to and read back by: Ashley Nails RN 04/06/2023  14:48      Gram stain aer bottle: Gram negative rods      Positive results previously called 04/07/2023  20:26      PROTEUS MIRABILIS  For susceptibility see order #[id number]      Narrative:      Aerobic and anaerobic    Urine culture [[id number]] Collected: 04/06/23 0940    Order Status: Completed Specimen: Urine Updated: 04/1953     Urine Culture, Routine No growth    Narrative:      Specimen Source->Urine    C Diff Toxin by PCR [[id number]]  (Abnormal) Collected: 04/06/23 1145    Order Status: Completed Updated: 04/07/23 0520     C. diff PCR Positive    Clostridium difficile EIA [[id number]]  (Abnormal) Collected: 04/06/23 1145    Order Status: Completed Specimen: Stool Updated: 04/06/23 1621     C. diff Antigen Positive     C difficile Toxins A+B, EIA Negative     Comment: Testing not recommended for children <24 months old.       Rapid Organism ID by PCR (from Blood culture) [[id number]]  (Abnormal) Collected: 04/05/23 1757    Order Status: Completed Updated: 04/06/23 1524     Enterococcus faecalis Detected     Enterococcus faecium Not Detected     Listeria Monocytogenes Not Detected     Staphylococcus spp. Not Detected     Staphylococcus aureus Not Detected     Staphylococcus epidermidis Not Detected     Staphylococcus lugdunensis Not Detected     Streptococcus species See species for ID     Streptococcus agalactiae Detected     Streptococcus pneumoniae Not Detected     Streptococcus pyogenes Not Detected     Acinetobacter calcoaceticus/baumannii complex Not Detected     Bacteroides fragilis Not Detected     Enterobacerales See species for ID     Enterobacter cloacae complex Not Detected     Escherichia Not Detected     Klebsiella aerogenes Not Detected     Klebsiella oxytoca Not Detected     Klebsiella pneumoniae group Not Detected     Proteus Detected     Salmonella sp Not Detected     Serratia marcescens Not Detected     Haemophilus influenzae Not Detected     Neisseria meningtidis Not Detected      Pseudomonas aeruginosa Not Detected     Stenotrophomonas maltophilia Not Detected     Candida albicans Not Detected     Candida auris Not Detected     Candida glabrata Not Detected     Candida krusei Not Detected     Candida parapsilosis Not Detected     Candida tropicalis Not Detected     Cryptococcus neoformans/gattii Not Detected     CTX-M (ESBL ) Not Detected     IMP (Carbapenem resistant) Not Detected     KPC resistance gene (Carbapenem resistant) Not Detected     mcr-1  Test Not Applicable     mec A/C  Test Not Applicable     mec A/C and MREJ (MRSA) gene Test Not Applicable     NDM (Carbapenem resistant) Not Detected     OXA-48-like (Carbapenem resistant) Not Detected     van A/B (VRE gene) Not Detected     VIM (Carbapenem resistant) Not Detected    Narrative:      Aerobic and anaerobic          ID on board  Cont IVAB

## 2023-04-13 NOTE — PROGRESS NOTES
[facility] - Intensive Care ([facility])  Nephrology  Progress Note    Patient Name: Lavelle Ladd  MRN: [id number]  Admission Date: 4/5/2023  Hospital Length of Stay: 8 days  Attending Provider: Geovani Ugalde MD   Primary Care Physician: Primary Doctor No  Principal Problem:Osteomyelitis    Consults  Subjective:     Interval History:  Patient was seen in his hospital room.  In bed resting comfortably.  No acute distress noted.  States that he would like to go home today.    Review of systems:  No new complaints from overnight.  No shortness of breath or chest discomfort.  No fevers or chills.  No nausea vomiting.  No swelling.      Review of patient's allergies indicates:  No Known Allergies  Current Facility-Administered Medications   Medication Frequency    0.9%  NaCl infusion PRN    acetaminophen tablet 650 mg Q6H PRN    ceFEPIme (MAXIPIME) 2 g in dextrose 5 % in water (D5W) 5 % 50 mL IVPB (MB+) Q12H    dextrose 10% bolus 125 mL 125 mL PRN    dextrose 10% bolus 250 mL 250 mL PRN    famotidine tablet 20 mg Daily    gabapentin capsule 300 mg TID    glucagon (human recombinant) injection 1 mg PRN    heparin (porcine) injection 5,000 Units Q8H    HYDROcodone-acetaminophen  mg per tablet 1 tablet Q4H PRN    influenza 65up-adj (QUADRIVALENT ADJUVANTED PF) vaccine 0.5 mL vaccine x 1 dose    insulin aspart U-100 pen 1-10 Units QID (AC + HS) PRN    meclizine tablet 25 mg TID PRN    metroNIDAZOLE tablet 500 mg Q8H    morphine injection 2 mg Q6H PRN    ondansetron injection 4 mg Q6H PRN    sodium bicarbonate tablet 1,300 mg BID    tacrolimus capsule 0.5 mg Daily PM    tacrolimus capsule 1 mg Daily AM    vancomycin SolR 125 mg Q6H       Objective:     Vital Signs (Most Recent):  Temp: 98.5 °F (36.9 °C) (04/13/23 0733)  Pulse: 78 (04/13/23 0914)  Resp: 16 (04/13/23 0914)  BP: (!) 124/52 (04/13/23 0903)  SpO2: 96 % (04/13/23 0733)   Vital Signs (24h Range):  Temp:  [97.8 °F (36.6 °C)-98.5 °F (36.9 °C)] 98.5 °F (36.9  °C)  Pulse:  [71-78] 78  Resp:  [16-19] 16  SpO2:  [95 %-97 %] 96 %  BP: ()/(52-59) 124/52     Weight: 82.4 kg (181 lb 10.5 oz) (04/11/23 0043)  Body mass index is 25.34 kg/m².  Body surface area is 2.03 meters squared.    I/O last 3 completed shifts:  In: 2064.7 [P.O.:1920; I.V.:0.5; IV Piggyback:144.2]  Out: 2650 [Urine:2650]    Physical Exam  Constitutional:       Appearance: Normal appearance.   HENT:      Head: Normocephalic and atraumatic.   Eyes:      General: No scleral icterus.     Extraocular Movements: Extraocular movements intact.      Pupils: Pupils are equal, round, and reactive to light.   Cardiovascular:      Rate and Rhythm: Normal rate and regular rhythm.   Pulmonary:      Effort: Pulmonary effort is normal.      Breath sounds: Normal breath sounds.   Musculoskeletal:      Right lower leg: No edema.      Left lower leg: No edema.   Skin:     General: Skin is warm and dry.   Neurological:      General: No focal deficit present.      Mental Status: He is alert and oriented to person, place, and time.   Psychiatric:         Mood and Affect: Mood normal.         Behavior: Behavior normal.       Significant Labs:sureBMP:   Recent Labs   Lab 04/13/23  0551   *   *   K 4.1      CO2 21*   BUN 24*   CREATININE 1.9*   CALCIUM 8.8   MG 1.7       CMP:   Recent Labs   Lab 04/13/23  0551   *   CALCIUM 8.8   *   K 4.1   CO2 21*      BUN 24*   CREATININE 1.9*       All labs within the past 24 hours have been reviewed.    Significant Imaging:  Labs: Reviewed      Assessment/Plan:     Active Diagnoses:    Diagnosis Date Noted POA    PRINCIPAL PROBLEM:  Osteomyelitis [M86.9] 11/10/2018 Yes    Bacteremia [R78.81] 04/08/2023 Yes    Clostridium difficile infection [A49.8] 04/07/2023 Yes    Immunosuppression [D84.9]  Yes    Septic shock [A41.9, R65.21] 09/18/2018 Yes    Type 2 diabetes mellitus with hyperglycemia, with long-term current use of insulin [E11.65, Z79.4]  Not  Applicable     donor kidney transplant for DM 16, DINORAH on CKD [Z94.0]  Not Applicable     Chronic      Problems Resolved During this Admission:       Assessment and plan:    1. Kidney transplant status: Status post kidney transplant in 2016.  Baseline creatinine appears run between about 1.2 and 1.4.    2. DINORAH:  Serum creatinine has been relatively stable ranging between 1.8 and 2.0 for the past several days.  Hopefully his creatinine will return to his usual baseline over the next several weeks.    3. Immunosuppression:  Prograf was adjusted yesterday decreasing to 1 mg in the morning and 0.5 in the evening.  Will check a Prograf level tomorrow morning.  Targeting serum Prograf levels around 5.    4. Electrolytes: Potassium is stable at 4.1.  Bicarbonate is slightly low at 21.         Thank you for your consult.     Jose David Hooker MD  Nephrology  [facility] - [facility]ensive [facility] ([facility])

## 2023-04-13 NOTE — PT/OT/SLP PROGRESS
Occupational Therapy  Treatment    Lavelle Ladd   MRN: [id number]   Admitting Diagnosis: Osteomyelitis    OT Date of Treatment: 04/13/23   OT Start Time: 1045  OT Stop Time: 1115  OT Total Time (min): 30 min    Billable Minutes:  Therapeutic Activity 15 and Therapeutic Exercise 10    OT/MIGUEL: OT          General Precautions: Standard, fall  Orthopedic Precautions: RLE non weight bearing  Braces: N/A  Respiratory Status: Room air         Subjective:  Communicated with NURSE AND Epic CHART REVIEW prior to session.    Pain/Comfort  Pain Rating 1: 7/10  Location - Side 1: Right  Location - Orientation 1: lower  Location 1: leg  Pain Rating Post-Intervention 1: 7/10    Objective:  Patient found with: telemetry     Functional Mobility:  Bed Mobility:  SBA WITH BED RAILS WITH ROLLING L<>R  CGA WITH FORWARD SCOOTING     Transfers:  MAX A X 2 WITH SIT<>STAND X 2 TRAILS    Activities of Daily Living:   LE SBA SET-UP AND RETRIEVAL WITH LE DRESSING     Balance:   Static Sit: FAIR+: Able to take MINIMAL challenges from all directions  Dynamic Sit: FAIR: Cannot move trunk without losing balance  Static Stand: 0: Needs MAXIMAL assist to maintain   Dynamic stand: 0: N/A    Therapeutic Activities and Exercises:  Patient educated on role of OT in acute setting and benefits of participation. Educated on techniques to use to increase independence and decrease fall risk with functional transfers. Educated on importance of SITTING EOB activity and calling for A to transfer back to bed, HOWEVER, PT REPORTED EXTREME DIZZINESS.  PT EDUCATED ON HEP. PT VERBALIZED  AND RETURNED DEMONSTRATION.PT PERFORMED 1 SET X 15 REPS B UE ROM EXERCISE WITH 3 LB DOWEL WITH BED IN CHAIR POSITION SHOULDER FLEXION. CHEST PRESS, ELBOW FLEX/EXT AND SHOULDER ROTATION) . Encouraged completion of B UE AROM therex throughout the day to tolerance to increase functional strength and activity tolerance. Patient stated understanding and in agreement with  "POC.      AM-PAC 6 CLICK ADL   How much help from another person does this patient currently need?   1 = Unable, Total/Dependent Assistance  2 = A lot, Maximum/Moderate Assistance  3 = A little, Minimum/Contact Guard/Supervision  4 = None, Modified [place]/Independent    Putting on and taking off regular lower body clothing? : 3  Bathing (including washing, rinsing, drying)?: 3  Toileting, which includes using toilet, bedpan, or urinal? : 3  Putting on and taking off regular upper body clothing?: 3  Taking care of personal grooming such as brushing teeth?: 4  Eating meals?: 4  Daily Activity Total Score: 20     AM-PAC Raw Score CMS "G-Code Modifier Level of Impairment Assistance   6 % Total / Unable   7 - 8 CM 80 - 100% Maximal Assist   9-13 CL 60 - 80% Moderate Assist   14 - 19 CK 40 - 60% Moderate Assist   20 - 22 CJ 20 - 40% Minimal Assist   23 CI 1-20% SBA / CGA   24 CH 0% Independent/ Mod I       Patient left with bed in chair position with all lines intact, call button in reach, bed alarm on, and NURSE notified    ASSESSMENT:  Lavelle Ladd is a 67 y.o. male with a medical diagnosis of Osteomyelitis and presents with DEBILITY AND GENERALIZED WEAKNESS.    Rehab identified problem list/impairments:  weakness, impaired functional mobility, decreased safety awareness, impaired endurance, impaired balance, impaired self care skills    Rehab potential is good.    Activity tolerance: Good    Discharge recommendations: nursing facility, skilled, home health OT   Barriers to discharge: Barriers to Discharge: Decreased caregiver support    Equipment recommendations: none    GOALS:   Multidisciplinary Problems       Occupational Therapy Goals          Problem: Occupational Therapy    Goal Priority Disciplines Outcome Interventions   Occupational Therapy Goal     OT, PT/OT Ongoing, Progressing    Description: O.T. GOALS TO BE MET BY 4-25-23  PT WILL TOLERATE 1 SET X 15 REPS B UE STRENGTH/ ENDURANCE " EXERCISE  S WITH UE DRESSING  SBA WITH AE WITH TOILET TRANSFERS                         Plan:  Patient to be seen 2 x/week to address the above listed problems via self-care/home management, therapeutic activities, therapeutic exercises  Plan of Care expires: 04/27/23  Plan of Care reviewed with: patient         04/13/2023

## 2023-04-13 NOTE — PLAN OF CARE
Patient ready for discharge, IV removed as ordered PICC line left in place for home IV antibiotics. Cardiac monitor removed and returned to nurses station as ordered. All medications delivered to the bedside. Discharge instructions reviewed with the patient, verbalized understanding. Assisted patient with getting dressed.

## 2023-04-13 NOTE — ASSESSMENT & PLAN NOTE
Patient's FSGs are controlled on current medication regimen.  Last A1c reviewed-   Lab Results   Component Value Date    HGBA1C 6.7 (H) 04/05/2023     Most recent fingerstick glucose reviewed-   Recent Labs   Lab 04/12/23  1201 04/12/23  1704 04/12/23 2014 04/13/23  0530   POCTGLUCOSE 171* 168* 192* 146*     Current correctional scale  Medium  Maintain anti-hyperglycemic dose as follows-   Antihyperglycemics (From admission, onward)    Start     Stop Route Frequency Ordered    04/07/23 0823  insulin aspart U-100 pen 1-10 Units         -- SubQ Before meals & nightly PRN 04/07/23 0724        Hold Oral hypoglycemics while patient is in the hospital.

## 2023-04-13 NOTE — PLAN OF CARE
04/13/23 1134   Final Note   Assessment Type Final Discharge Note   Anticipated Discharge Disposition Home-Health   Hospital Resources/Appts/Education Provided Appointments scheduled and added to AVS   Post-Acute Status   Post-Acute Authorization Home Health   Home Health Status Set-up Complete/Auth obtained   Coverage [employer]   IV Infusion Status Set-up Complete/Auth obtained

## 2023-04-13 NOTE — PROGRESS NOTES
1:44pm: Spoke with Neel about patient d/c today with [facility]. Patient will get his second dose of cefepime prior to d/c home today and we will make delivery of medication and supplies to patient's home this evening. [facility]  of  will visit with patient in the AM to do in person education of IV ATB administration of cefepime 2 grams every 12 hours.     1:55pm: Spoke with patient's sister Kecia she did review educational video on IV ATB administration and feels comfortable with infusion but states the patient will need to be responsible for doing his own infusions and she will help when she can. Informed her that the HH nurse will visit patient tomorrow AM for education. She verbalized understanding.     4:16pm: Spoke with Nancy who is assigned to be the patient's HH nurse with [facility] . Verified that she will see patient in the AM for dosing and informed her of infusion method for Cefepime. She verbalized understanding.     Nancy Young, RN, BSN  Clinical Liaison   [facility]  Cell [phone]  Available M-F 8:30-5pm  Office [phone]  Available 24/7

## 2023-04-13 NOTE — PT/OT/SLP PROGRESS
Physical Therapy  Treatment    Lavelle Ladd   MRN: [id number]   Admitting Diagnosis: Osteomyelitis    PT Received On: 04/13/23  PT Start Time: 1035     PT Stop Time: 1105    PT Total Time (min): 30 min       Billable Minutes:  Therapeutic Activity 30    Treatment Type: Treatment  PT/PTA: PTA     Number of PTA visits since last PT visit: 1       General Precautions: Standard, fall, special contact  Orthopedic Precautions: RLE non weight bearing  Braces: N/A  Respiratory Status: Room air         Subjective:  Communicated with patient's nurse, Sherita DURAN, and completed Epic chart review prior to session.  Patient agreed to PT session with encouragement.     Pain/Comfort  Pain Rating 1: 7/10  Location - Side 1: Right  Location 1: leg  Pain Addressed 1: Other (see comments) (ACTIVITY PACING)  Pain Rating Post-Intervention 1: 7/10    Objective:   Patient found with: telemetry, peripheral IV, PICC line    Supine > sit EOB: CGA    Forward scoot towards EOB: CGA    Seated EOB x 10 min total focusing on increased tolerance to upright position, core stability, trunk control and CV endurance.   Maintained self supported sitting balance with SBA  Maintained unsupported sitting balance with  SBA    STS from EOB > RW x2 trials (prosthesis donned on LLE): Max A of 2  Quick to fatigue and c/o dizziness between reps    Sit > supine: CGA    Supine scoot towards HOB: CGA (bed in trend)    Long sitting TE x10 reps completed to BLE AROM:   Hip flex/ext, quad sets, hip ABD/ADD    Educated patient on importance of increased tolerance to upright position and direct impact on CV endurance and strength. Patient encouraged to utilize elevated HOB to simulate chair position until able to safely complete chair T/F. Patient given a minimum goal of majority of the day to be spent in upright, especially with all meals. Encouraged patient to perform AROM TE to BLE throughout the day within all available planes of motion. Re enforced importance of  utilizing call light to meet needs in room and not attempt to get up without staff assistance. Patient verbalized understanding and agreed to comply.    AM-PAC 6 CLICK MOBILITY  How much help from another person does this patient currently need?   1 = Unable, Total/Dependent Assistance  2 = A lot, Maximum/Moderate Assistance  3 = A little, Minimum/Contact Guard/Supervision  4 = None, Modified Cass/Independent    Turning over in bed (including adjusting bedclothes, sheets and blankets)?: 4  Sitting down on and standing up from a chair with arms (e.g., wheelchair, bedside commode, etc.): 2  Moving from lying on back to sitting on the side of the bed?: 2  Moving to and from a bed to a chair (including a wheelchair)?: 1  Need to walk in hospital room?: 1  Climbing 3-5 steps with a railing?: 1  Basic Mobility Total Score: 11    AM-PAC Raw Score CMS G-Code Modifier Level of Impairment Assistance   6 % Total / Unable   7 - 9 CM 80 - 100% Maximal Assist   10 - 14 CL 60 - 80% Moderate Assist   15 - 19 CK 40 - 60% Moderate Assist   20 - 22 CJ 20 - 40% Minimal Assist   23 CI 1-20% SBA / CGA   24 CH 0% Independent/ Mod I     Patient left with bed in chair position with call button in reach.    Assessment:  Lavelle Ladd is a 67 y.o. male with a medical diagnosis of Osteomyelitis and presents with overall decline in functional mobility. Patient would continue to benefit from skilled PT to address functional limitations listed below in order to return to PLOF/decrease caregiver burden.     Rehab identified problem list/impairments: weakness, impaired endurance, impaired sensation, impaired self care skills, impaired functional mobility, gait instability, impaired balance, decreased coordination, decreased upper extremity function, decreased lower extremity function, decreased safety awareness, pain, decreased ROM, impaired coordination, impaired skin, edema, impaired cardiopulmonary response to activity,  orthopedic precautions    Rehab potential is fair.    Activity tolerance: Fair    Discharge recommendations: nursing facility, skilled      Barriers to discharge:      Equipment recommendations: none     GOALS:   Multidisciplinary Problems       Physical Therapy Goals          Problem: Physical Therapy    Goal Priority Disciplines Outcome Goal Variances Interventions   Physical Therapy Goal     PT, PT/OT      Description: LT23  1. PT WILL COMPLETE BED MOBILITY IND  2. PT WILL SCOOT/ SLIDE BOARD T/F TO CHAIR WITH MIN A  3. PT WILL TOLERATE B LE TE X FOR STRENGTHENING.                        PLAN:    Patient to be seen 3 x/week to address the above listed problems via therapeutic activities, therapeutic exercises  Plan of Care expires: 23  Plan of Care reviewed with: patient         2023

## 2023-04-13 NOTE — DISCHARGE SUMMARY
[facility] Medicine  Discharge Summary      Patient Name: Lavelle Ladd  MRN: [id number]  Patient Class: IP- Inpatient  Admission Date: 4/5/2023   Hospital Length of Stay: 8 days  Discharge Date and Time:  04/13/2023 11:40 AM  Attending Physician: Geovani Ugalde MD   Discharging Provider: Geovani Arenas MD  Primary Care Provider: Primary Doctor No      HPI:   66 y/o WM with a PMHx of CAD, CHF, COPD, kidney transplant, HLD, HTN, PAD, PVD,  B/L LE BKA  and DM2 Presents to ED on 4/5 with intractable vomiting. Was at [facility] with plan for d/c  but noted vomiting, fever, cough, body aches, diarrhea.ED evaluation revealed foul smelling drainage from rt stump wound.X ray concerning with osseous and soft tissue changes suspicious for osteo.Labs - wbc 10.5; lactate 2.7; procal 7; creatinine 2.2 (baseline 1.2) blood cultures obtained.Treated with 2.5L IVF (plus 500ml from EMS), tylenol, vanco, rocephin.Refractory hypotension requiring pressor support and admitted to ICU. ID and nephrology cosnulted . Blood cx (+) for strep and proteus  .star Wound cx (+) for GNR. Cdiff PCR positive . He is on IV vanc , rocephin , po vanc   and po metronidazole .   Pt has been   admitted multiple times since 8 /4/22  in multiples facility including  [facility], [facility]  and  [place] .    IM consulted to assume care .             * No surgery found *      Hospital Course:   Admitted for septic shock s/t bacteremia, osteomyelitis, LE wounds, and Cdiff colitis. Continue IV abx per cultures and wound care. Per ID, will need 6 weeks of cefepime and flagyl. PICC line placed. Pt clinically improving since admission, consult SW for postacute placement. Pt prefers to DC home with . Nephrology following for DINORAH.        Goals of Care Treatment Preferences:  Code Status: Full Code    Health care agent: sister Kecia Sagastume  Health care agent number: No value filed.          What is most important right now is to  focus on improvement in condition but with limits to invasive therapies.  Accordingly, we have decided that the best plan to meet the patient's goals includes continuing with treatment.      Consults:   Consults (From admission, onward)        Status Ordering Provider     [facility]  Once        Provider:  (Not yet assigned)    Acknowledged ROSS GLORIA     Inpatient virtual consult to Hospital Medicine  Once        Provider:  Ross Gloria MD    Completed MADISON BERRY     Inpatient consult to Hospitalist  Once        Provider:  Ramon Irby MD    Acknowledged JUAN LAWRENCE     IP consult to case management  Once        Provider:  (Not yet assigned)    Completed DEBBIE LOBO     Inpatient consult to Nephrology  Once        Provider:  Lauri Blackburn MD    Completed TEJA GARCÍA     Inpatient consult to Infectious Diseases  Once        Provider:  Ronny Veliz MD, FIDSA    Acknowledged TEJA GARCÍA          Renal/   donor kidney transplant for DM 16, DINORAH on CKD  Nephrology on board  Cleared for discharge        ID  * Osteomyelitis  Cont IVAB  Right stump MRI  Findings concerning for early osteomyelitis of the stump of the tibia about the wound margin. Soft tissue irregularities concerning for component of infection as above.   ID on board. Will need 6 weeks of IV abx. on cefepime and po flagyl   PICC line placed, ok by nephro    pending HH set up     Septic shock  S/p vasopressor  Etiology = bacteremia , wound infection ,C,diff and osteo   Cont IVAB - on cefepime and po flagyl   ID on board   Resolved    Clostridium difficile infection  Cont isolation   Cont po vanc       Bacteremia  Microbiology Results (last 7 days)     Procedure Component Value Units Date/Time    Culture, Anaerobe [[id number]] Collected: 23 1707    Order Status: Completed Specimen: Wound from Leg, Right Updated: 23 1058     Anaerobic Culture No anaerobes  isolated    Narrative:      Right stump wound    Culture, Anaerobe [[id number]]  (Abnormal) Collected: 04/05/23 2245    Order Status: Completed Specimen: Wound from Leg, Right Updated: 04/10/23 1501     Anaerobic Culture PORPHYROMONAS SOMERAE  Many      Narrative:      Rt BKA stump wound    Aerobic culture [[id number]]  (Abnormal)  (Susceptibility) Collected: 04/06/23 1707    Order Status: Completed Specimen: Wound from Leg, Right Updated: 04/10/23 1038     Aerobic Bacterial Culture PSEUDOMONAS AERUGINOSA  Moderate        PROVIDENCIA STUARTII  Moderate  Skin skylar also present      Narrative:      Right stump wound    Blood culture x two cultures. Draw prior to antibiotics. [[id number]]  (Abnormal)  (Susceptibility) Collected: 04/05/23 1757    Order Status: Completed Specimen: Blood from Peripheral, Antecubital, Right Updated: 04/09/23 1128     Blood Culture, Routine Gram stain susan bottle: Gram negative rods, Gram positive cocci      Results called to and read back by: Ashley Nails RN 04/06/2023  14:48      Gram stain aer bottle: Gram positive cocci and Gram negative rods      Positive results previously called 04/06/2023      STREPTOCOCCUS AGALACTIAE (GROUP B)  Beta-hemolytic streptococci are routinely susceptible to   penicillins,cephalosporins and carbapenems.        PROTEUS MIRABILIS    Narrative:      Aerobic and anaerobic    Blood culture x two cultures. Draw prior to antibiotics. [[id number]]  (Abnormal) Collected: 04/05/23 1945    Order Status: Completed Specimen: Blood from Peripheral, Wrist, Right Updated: 04/08/23 1019     Blood Culture, Routine Gram stain susan bottle: Gram negative rods      Results called to and read back by: Ashley Nails RN 04/06/2023 14:48      Gram stain aer bottle: Gram negative rods      Positive results previously called 04/07/2023  20:26      PROTEUS MIRABILIS  For susceptibility see order #[id number]      Narrative:      Aerobic and anaerobic    Urine culture [[id number]] Collected:  04/06/23 0940    Order Status: Completed Specimen: Urine Updated: 04/1953     Urine Culture, Routine No growth    Narrative:      Specimen Source->Urine    C Diff Toxin by PCR [[id number]]  (Abnormal) Collected: 04/06/23 1145    Order Status: Completed Updated: 04/07/23 0520     C. diff PCR Positive    Clostridium difficile EIA [[id number]]  (Abnormal) Collected: 04/06/23 1145    Order Status: Completed Specimen: Stool Updated: 04/06/23 1621     C. diff Antigen Positive     C difficile Toxins A+B, EIA Negative     Comment: Testing not recommended for children <24 months old.       Rapid Organism ID by PCR (from Blood culture) [[id number]]  (Abnormal) Collected: 04/05/23 1757    Order Status: Completed Updated: 04/06/23 1524     Enterococcus faecalis Detected     Enterococcus faecium Not Detected     Listeria Monocytogenes Not Detected     Staphylococcus spp. Not Detected     Staphylococcus aureus Not Detected     Staphylococcus epidermidis Not Detected     Staphylococcus lugdunensis Not Detected     Streptococcus species See species for ID     Streptococcus agalactiae Detected     Streptococcus pneumoniae Not Detected     Streptococcus pyogenes Not Detected     Acinetobacter calcoaceticus/baumannii complex Not Detected     Bacteroides fragilis Not Detected     Enterobacerales See species for ID     Enterobacter cloacae complex Not Detected     Escherichia Not Detected     Klebsiella aerogenes Not Detected     Klebsiella oxytoca Not Detected     Klebsiella pneumoniae group Not Detected     Proteus Detected     Salmonella sp Not Detected     Serratia marcescens Not Detected     Haemophilus influenzae Not Detected     Neisseria meningtidis Not Detected     Pseudomonas aeruginosa Not Detected     Stenotrophomonas maltophilia Not Detected     Candida albicans Not Detected     Candida auris Not Detected     Candida glabrata Not Detected     Candida krusei Not Detected     Candida parapsilosis Not Detected     Candida  tropicalis Not Detected     Cryptococcus neoformans/gattii Not Detected     CTX-M (ESBL ) Not Detected     IMP (Carbapenem resistant) Not Detected     KPC resistance gene (Carbapenem resistant) Not Detected     mcr-1  Test Not Applicable     mec A/C  Test Not Applicable     mec A/C and MREJ (MRSA) gene Test Not Applicable     NDM (Carbapenem resistant) Not Detected     OXA-48-like (Carbapenem resistant) Not Detected     van A/B (VRE gene) Not Detected     VIM (Carbapenem resistant) Not Detected    Narrative:      Aerobic and anaerobic        ID on board  Cont IVAB - on cefepime and po flagyl     Immunology/Multi System  Immunosuppression  Cont current tx       Endocrine  Type 2 diabetes mellitus with hyperglycemia, with long-term current use of insulin  Patient's FSGs are controlled on current medication regimen.  Last A1c reviewed-   Lab Results   Component Value Date    HGBA1C 6.7 (H) 2023     Most recent fingerstick glucose reviewed-   Recent Labs   Lab 23  1201 23  1704 23  0530   POCTGLUCOSE 171* 168* 192* 146*     Current correctional scale  Medium  Maintain anti-hyperglycemic dose as follows-   Antihyperglycemics (From admission, onward)    Start     Stop Route Frequency Ordered    23 0823  insulin aspart U-100 pen 1-10 Units         -- SubQ Before meals & nightly PRN 23 0724        Hold Oral hypoglycemics while patient is in the hospital.      Final Active Diagnoses:    Diagnosis Date Noted POA    PRINCIPAL PROBLEM:  Osteomyelitis [M86.9] 11/10/2018 Yes    Bacteremia [R78.81] 2023 Yes    Clostridium difficile infection [A49.8] 2023 Yes    Septic shock [A41.9, R65.21] 2018 Yes    Immunosuppression [D84.9]  Yes    Type 2 diabetes mellitus with hyperglycemia, with long-term current use of insulin [E11.65, Z79.4]  Not Applicable     donor kidney transplant for DM 16, DINORAH on CKD [Z94.0]  Not Applicable     Chronic       Problems Resolved During this Admission:       Discharged Condition: good    Disposition: Home-Health Care [facility]    Follow Up:    Patient Instructions:      Ambulatory referral/consult to Outpatient Case Management   Referral Priority: Routine Referral Type: Consultation   Referral Reason: Specialty Services Required   Number of Visits Requested: 1     Ambulatory referral/consult to Home Health   Standing Status: Future   Referral Priority: Routine Referral Type: Home Health   Referral Reason: Specialty Services Required   Requested Specialty: Home Health Services   Number of Visits Requested: 1     Ambulatory referral/consult to Infectious Disease   Standing Status: Future   Referral Priority: Routine Referral Type: Consultation   Referral Reason: Specialty Services Required   Requested Specialty: Infectious Diseases   Number of Visits Requested: 1     Diet Adult Regular     Notify your health care provider if you experience any of the following:  temperature >100.4     Activity as tolerated       Significant Diagnostic Studies: N/A    Pending Diagnostic Studies:     Procedure Component Value Units Date/Time    Tacrolimus level [[id number]] Collected: 04/13/23 0551    Order Status: Sent Lab Status: In process Updated: 04/13/23 0552    Specimen: Blood          Medications:  Reconciled Home Medications:      Medication List      START taking these medications    dextrose 5 % in water (D5W) 5 % PgBk 50 mL with ceFEPIme 2 gram SolR 2 g  Inject 2 g into the vein every 12 (twelve) hours.     metroNIDAZOLE 500 MG tablet  Commonly known as: FLAGYL  Take 1 tablet (500 mg total) by mouth every 8 (eight) hours.     vancomycin 125 MG capsule  Commonly known as: VANCOCIN  Take 1 capsule (125 mg total) by mouth every 6 (six) hours. for 4 days        CONTINUE taking these medications    acetaminophen 500 MG tablet  Commonly known as: TYLENOL  Take 2 tablets (1,000 mg total) by mouth 3 (three) times daily.     amLODIPine 10 MG  tablet  Commonly known as: NORVASC  Take 10 mg by mouth once daily.     ascorbic acid (vitamin C) 500 MG tablet  Commonly known as: VITAMIN C  Take 1 tablet by mouth every morning.     aspirin 81 MG EC tablet  Commonly known as: ECOTRIN  Take 1 tablet (81 mg total) by mouth once daily.     atorvastatin 80 MG tablet  Commonly known as: LIPITOR  Take 80 mg by mouth once daily.     carvediloL 3.125 MG tablet  Commonly known as: COREG  Take 3.125 mg by mouth 2 (two) times a day.     cholecalciferol (vitamin D3) 125 mcg (5,000 unit) Tab  Take 5,000 Units by mouth every Monday.     gabapentin 300 MG capsule  Commonly known as: NEURONTIN  Take 300 mg by mouth 3 (three) times daily.     HumuLIN R Regular U-100 Insuln 100 unit/mL injection  Generic drug: insulin regular  Inject  into the skin 2 (two) times daily before meals. If blood sugar 200-250= 4 units, 251-300= 6 units, 301-350= 8 units, 351-400= 10 units, 401- 450= 12 units, >450= 14 units and call MD.     insulin detemir U-100 (Levemir) 100 unit/mL (3 mL) Inpn pen  Inject 12 Units into the skin 2 (two) times daily.     levothyroxine 125 MCG tablet  Commonly known as: SYNTHROID  Take 125 mcg by mouth once daily.     meloxicam 7.5 MG tablet  Commonly known as: MOBIC  Take 7.5 mg by mouth once daily.     multivitamin Tab  Take 1 tablet by mouth once daily.     mycophenolate 250 mg Cap  Commonly known as: CELLCEPT  Take 250 mg by mouth 2 (two) times daily.     nitroGLYCERIN 0.4 MG SL tablet  Commonly known as: NITROSTAT  Place 1 tablet (0.4 mg total) under the tongue every 5 (five) minutes as needed.     ondansetron 4 MG tablet  Commonly known as: ZOFRAN  Take 4 mg by mouth every 8 (eight) hours as needed for Nausea.     OZEMPIC 1 mg/dose (2 mg/1.5 mL) Pnij  Generic drug: semaglutide  Inject 1 mg under the skin every 7 days.     sertraline 25 MG tablet  Commonly known as: ZOLOFT  Take 25 mg by mouth once daily.     tacrolimus 0.5 MG Cap  Commonly known as: PROGRAF  Take  2 capsules (1 mg total) by mouth every 12 (twelve) hours.     traMADoL 50 mg tablet  Commonly known as: ULTRAM  Take 50 mg by mouth every 6 (six) hours as needed.            Indwelling Lines/Drains at time of discharge:   Lines/Drains/Airways     Peripherally Inserted Central Catheter Line  Duration           PICC Double Lumen 04/11/23 1340 right brachial 1 day          Drain  Duration                Hemodialysis AV Fistula 09/14/22 0717 Left upper arm 211 days                Time spent on the discharge of patient: 30 minutes         The attending portion of this evaluation, treatment, and documentation was performed per Geovani Arenas MD via Telemedicine AudioVisual using the secure Face[employer] Mobile software platform with 2 way audio/video. The provider was located off-site and the patient is located in the hospital. The aforementioned video software was utilized to document the relevant history and physical exam    Geovani Arenas MD  Department of Hospital Medicine  '[facility]

## 2023-04-13 NOTE — PT/OT/SLP PROGRESS
Physical Therapy      Patient Name:  Lavelle Ladd   MRN:  [id number]    10:05 a.m.  Patient soiled at this time.   Will follow up later today.

## 2023-04-13 NOTE — PLAN OF CARE
Case Management  spoke with patients sister over the phone, due to isolation precautions.Attempted to contact patient via room phone and cell phone on file. Hospitals are required to deliver the Important Message from Medicare (IMM) to all Medicare beneficiaries who are hospital inpatients. [facility] informs hospitalized inpatient beneficiaries of their hospital discharge appeal rights. Explained IMM appeal process and answered all questions. Pt referred to  if appeal is sought.

## 2023-04-13 NOTE — PLAN OF CARE
[facility] - Med Surg  Discharge Reassessment    Primary Care Provider: Primary Doctor No    Expected Discharge Date:     Reassessment (most recent)       Discharge Reassessment - 04/13/23 0906          Discharge Reassessment    Assessment Type Discharge Planning Reassessment     Did the patient's condition or plan change since previous assessment? No     Discharge Plan discussed with: Patient     Communicated LISETH with patient/caregiver Date not available/Unable to determine     Discharge Plan A Home Health     Discharge Plan B Other   Iv Infusion

## 2023-04-17 PROBLEM — L97.916: Status: ACTIVE | Noted: 2023-01-01

## 2023-04-17 PROBLEM — E87.20 METABOLIC ACIDOSIS: Status: ACTIVE | Noted: 2023-01-01

## 2023-04-17 PROBLEM — E87.1 HYPONATREMIA: Status: ACTIVE | Noted: 2023-01-01

## 2023-04-17 NOTE — ED PROVIDER NOTES
SCRIBE #1 NOTE: I, Bradley Wright, am scribing for, and in the presence of, Paige Tran Do, MD. I have scribed the entire note.      History      Chief Complaint   Patient presents with    Leg Pain     R leg wound with pain.        Review of patient's allergies indicates:  No Known Allergies     HPI   HPI    2023, 1:18 PM   History obtained from the patient      History of Present Illness: Lavelle Ladd is a 67 y.o. male patient with a PMHx of CAD, CHF, ESRD s/p kidney transplant  on 16 who presents to the Emergency Department for RLE wound check. Pt reports a wound to his R BKA stump for the past week, but states that it has worsened since this morning. Symptoms are constant and moderate in severity. No mitigating or exacerbating factors reported. Associated sxs include RLE pain and intermittent nausea. Pt also reports slurred speech since taking Tramadol for the first time this morning. Patient denies any fever, chills, vomiting, SOB, CP, weakness, numbness, dizziness, headache, and all other sxs at this time. No further complaints or concerns at this time.     He originally had BKA last year by Dr. Uribe 2022    Arrival mode: Personal vehicle    PCP: Primary Doctor No       Past Medical History:  Past Medical History:   Diagnosis Date    DINORAH (acute kidney injury) 2016    Arthritis     CAD in native artery 2019    CHF (congestive heart failure)     Chronic obstructive pulmonary disease 2016    Coronary artery disease involving native coronary artery of native heart without angina pectoris 2016    CRI (chronic renal insufficiency) 2019     donor kidney transplant for DM 16     Induction with Thymo x3 and IV solumedrol to total 875mg  Kidney Biopsy  2016: 16 glomeruli, ACR type 1 AVR type 2, significant microcirculatory changes, c4d negative, No DSA, 5 to10% fibrosis. Treated with thymo x8 2016- no rejection      Diastolic heart failure     Encounter  for blood transfusion     ESRD on RRT since 10/2013 10/29/2013    Biopsy proven diabetic nephropathy and lymphoplasmacytic interstitial infiltrate not c/w with AIN (ddx sjogrens or assoc with tamm-horsefall protein extravasation)     GERD (gastroesophageal reflux disease)     History of hepatitis C, s/p successful Rx w/ SVR12 - 4/2017 4/5/2017    Completed 12 weeks harvoni w/ SVR    Hyperlipidemia     Hypertension     PAD (peripheral artery disease) 7/21/2019    PIC line (peripherally inserted central catheter) flush     Prophylactic immunotherapy     Proteinuria     PVD (peripheral vascular disease) 6/26/2017    RLE BKA CT 12/11/16 Extensive atherosclerotic disease of the aorta and mesenteric arteries.     Renal hypertension     Type 2 diabetes mellitus with diabetic neuropathy, with long-term current use of insulin 12/1/2016    Vitamin B12 deficiency        Past Surgical History:  Past Surgical History:   Procedure Laterality Date    ANGIOGRAPHY OF LOWER EXTREMITY N/A 9/23/2022    Procedure: ANGIOGRAM, LOWER EXTREMITY/left leg;  Surgeon: Donal Mcdonald MD;  Location: [facility] CATH LAB;  Service: Cardiovascular;  Laterality: N/A;    ANGIOGRAPHY OF LOWER EXTREMITY N/A 9/29/2022    Procedure: ANGIOGRAM, LOWER EXTREMITY/left leg;  Surgeon: Donal Mcdonald MD;  Location: [facility] CATH LAB;  Service: Peripheral Vascular;  Laterality: N/A;  resched from 9/23 pt ready now    AORTOGRAPHY WITH SERIALOGRAPHY N/A 6/14/2018    Procedure: LEFT LEG ANGIOGRAM;  Surgeon: Donal Mcdonald MD;  Location: [facility] CATH LAB;  Service: Vascular;  Laterality: N/A;    APPLICATION OF LARGE EXTERNAL FIXATION DEVICE TO TIBIA Left 8/4/2022    Procedure: APPLICATION, EXTERNAL FIXATION DEVICE, LARGE, TIBIA;  Surgeon: Good Villa MD;  Location: [facility] OR;  Service: Orthopedics;  Laterality: Left;    av bovine graft      Left UE    AV FISTULA PLACEMENT      left UE    BELOW KNEE AMPUTATION OF LOWER EXTREMITY Left 10/6/2022    Procedure: AMPUTATION, BELOW KNEE;   Surgeon: Donal Mcdonald MD;  Location: [facility] OR;  Service: Vascular;  Laterality: Left;    CARDIAC CATHETERIZATION  02/2015    CLOSED REDUCTION OF INJURY OF TIBIA Left 8/4/2022    Procedure: CLOSED REDUCTION, TIBIA;  Surgeon: Good Villa MD;  Location: [facility] OR;  Service: Orthopedics;  Laterality: Left;  Closed reduction left tibial and fibular shaft fractures    CLOSURE OF WOUND Left 9/24/2018    Procedure: CLOSURE, WOUND;  Surgeon: Karla Wheeler DPM;  Location: [facility] OR;  Service: Podiatry;  Laterality: Left;  Secondary Wound closure, extensive    CLOSURE OF WOUND Left 11/5/2018    Procedure: CLOSURE, WOUND;  Surgeon: Karla Wheeler DPM;  Location: [facility] OR;  Service: Podiatry;  Laterality: Left;    DEBRIDEMENT OF MULTIPLE METATARSAL BONES Left 11/5/2018    Procedure: DEBRIDEMENT, METATARSAL BONE, 2 OR MORE BONES;  Surgeon: Karla Wheeler DPM;  Location: [facility] OR;  Service: Podiatry;  Laterality: Left;    EXCISION OF SKIN Left 9/27/2019    Procedure: EXCISION, SKIN;  Surgeon: Lenard Alarcon MD;  Location: [address];  Service: Plastics;  Laterality: Left;  Plastics set, NIMS monitor, [employer]Cell    FOOT AMPUTATION THROUGH METATARSAL Left 9/21/2018    Procedure: AMPUTATION, FOOT, TRANSMETATARSAL;  Surgeon: Karla Wheeler DPM;  Location: [facility] OR;  Service: Podiatry;  Laterality: Left;    FOOT AMPUTATION THROUGH METATARSAL Left 10/31/2018    Procedure: AMPUTATION, FOOT, TRANSMETATARSAL;  Surgeon: Karla Wheeler DPM;  Location: [facility] OR;  Service: Podiatry;  Laterality: Left;    FOOT AMPUTATION THROUGH METATARSAL Left 11/5/2018    Procedure: AMPUTATION, FOOT, TRANSMETATARSAL;  Surgeon: Karla Wheeler DPM;  Location: [facility] OR;  Service: Podiatry;  Laterality: Left;  revisional transmetatarsal amputation, Left foot    IRRIGATION AND DEBRIDEMENT OF LOWER EXTREMITY Left 10/31/2018    Procedure: IRRIGATION AND DEBRIDEMENT, LOWER EXTREMITY;  Surgeon: Karla Wheeler DPM;  Location: [facility] OR;   Service: Podiatry;  Laterality: Left;    KIDNEY TRANSPLANT  05/21/2016    LEFT HEART CATHETERIZATION Left 7/21/2019    Procedure: CATHETERIZATION, HEART, LEFT;  Surgeon: Andrew Valdez MD;  Location: [facility] CATH LAB;  Service: Cardiology;  Laterality: Left;    LEG AMPUTATION THROUGH KNEE  2011    right LE, started as nail puncture leading to diabetic ulcer    REMOVAL OF EXTERNAL FIXATION DEVICE Left 9/17/2022    Procedure: REMOVAL, EXTERNAL FIXATION DEVICE;  Surgeon: Kathleen Zazueta MD;  Location: [facility] OR;  Service: Orthopedics;  Laterality: Left;    SKIN FULL THICKNESS GRAFT Left 10/7/2019    Procedure: APPLICATION, GRAFT, SKIN, FULL-THICKNESS;  Surgeon: Lenard Alarcon MD;  [facility];  Service: Plastics;  Laterality: Left;    SURGICAL REMOVAL OF LESION OF FACE Right 10/7/2019    Procedure: EXCISION, LESION, FACE;  Surgeon: Lenard Alarcon MD;  Location: [facility] OR [facility];  Service: Plastics;  Laterality: Right;         Family History:  Family History   Problem Relation Age of Onset    Cancer Father     Diabetes Father     Heart failure Father     Stroke Father     Heart failure Mother     Kidney disease Neg Hx        Social History:  Social History     Tobacco Use    Smoking status: Former     Packs/day: 1.00     Years: 40.00     Pack years: 40.00     Types: Cigarettes     Quit date: 1/11/2013     Years since quitting: 10.2    Smokeless tobacco: Never   Substance and Sexual Activity    Alcohol use: Yes     Alcohol/week: 6.0 standard drinks     Types: 6 Cans of beer per week     Comment: seldom    Drug use: No    Sexual activity: Never       ROS   Review of Systems   Constitutional:  Negative for chills and fever.   HENT:  Negative for sore throat.    Respiratory:  Negative for shortness of breath.    Cardiovascular:  Negative for chest pain.   Gastrointestinal:  Positive for nausea (intermittent). Negative for diarrhea and vomiting.   Genitourinary:  Negative for dysuria.   Musculoskeletal:  Positive  for myalgias (RLE). Negative for back pain.   Skin:  Positive for wound (R BKA stump). Negative for rash.   Neurological:  Positive for speech difficulty (slurred since taking Tramadol this morning). Negative for dizziness, weakness, light-headedness, numbness and headaches.   Hematological:  Does not bruise/bleed easily.   All other systems reviewed and are negative.    Physical Exam      Initial Vitals   BP Pulse Resp Temp SpO2   04/17/23 1239 04/17/23 1239 04/17/23 1239 04/17/23 1241 04/17/23 1239   (!) 90/58 86 16 97.5 °F (36.4 °C) 99 %      MAP       --                 Physical Exam  Nursing Notes and Vital Signs Reviewed.  Constitutional: Patient is in no acute distress. Well-developed and well-nourished.  Head: Atraumatic. Normocephalic.  Eyes: PERRL. EOM intact. Conjunctivae are not pale. No scleral icterus.  ENT: Mucous membranes are moist. Oropharynx is clear and symmetric.    Neck: Supple. Full ROM. No lymphadenopathy.  Cardiovascular: Regular rate. Regular rhythm. No murmurs, rubs, or gallops. Distal pulses are 2+ and symmetric.  Pulmonary/Chest: No respiratory distress. Clear to auscultation bilaterally. No wheezing or rales.  Abdominal: Soft and non-distended.  There is no tenderness.  No rebound, guarding, or rigidity.  Musculoskeletal: Moves all extremities. But has Bilateral BKA. Open wound to R BKA stump with surrounding erythema and bony exposure noted. + exudate noted  Skin: Warm and dry.  Neurological:  Alert, awake, and appropriate.  Slightly slurred speech.   Psychiatric: Normal affect. Good eye contact. Appropriate in content.            ED Course    Procedures  ED Vital Signs:  Vitals:    04/17/23 1239 04/17/23 1241 04/17/23 1330 04/17/23 1438   BP: (!) 90/58  (!) 116/51 (!) 123/57   Pulse: 86  82 83   Resp: 16  16 16   Temp:  97.5 °F (36.4 °C)     TempSrc:  Oral     SpO2: 99%  97% 100%       Abnormal Lab Results:  Labs Reviewed   CBC W/ AUTO DIFFERENTIAL - Abnormal; Notable for the  following components:       Result Value    RBC 3.70 (*)     Hemoglobin 9.1 (*)     Hematocrit 29.9 (*)     MCV 81 (*)     MCH 24.6 (*)     MCHC 30.4 (*)     RDW 15.2 (*)     Immature Granulocytes 0.7 (*)     Immature Grans (Abs) 0.06 (*)     All other components within normal limits   COMPREHENSIVE METABOLIC PANEL - Abnormal; Notable for the following components:    Sodium 129 (*)     CO2 16 (*)     Glucose 140 (*)     Creatinine 1.9 (*)     Albumin 2.5 (*)     AST 53 (*)     eGFR 38 (*)     All other components within normal limits   CULTURE, BLOOD   CULTURE, BLOOD   LACTIC ACID, PLASMA   URINALYSIS, REFLEX TO URINE CULTURE        All Lab Results:  Results for orders placed or performed during the hospital encounter of 04/17/23   CBC auto differential   Result Value Ref Range    WBC 9.12 3.90 - 12.70 K/uL    RBC 3.70 (L) 4.60 - 6.20 M/uL    Hemoglobin 9.1 (L) 14.0 - 18.0 g/dL    Hematocrit 29.9 (L) 40.0 - 54.0 %    MCV 81 (L) 82 - 98 fL    MCH 24.6 (L) 27.0 - 31.0 pg    MCHC 30.4 (L) 32.0 - 36.0 g/dL    RDW 15.2 (H) 11.5 - 14.5 %    Platelets 280 150 - 450 K/uL    MPV 9.6 9.2 - 12.9 fL    Immature Granulocytes 0.7 (H) 0.0 - 0.5 %    Gran # (ANC) 5.9 1.8 - 7.7 K/uL    Immature Grans (Abs) 0.06 (H) 0.00 - 0.04 K/uL    Lymph # 2.0 1.0 - 4.8 K/uL    Mono # 0.9 0.3 - 1.0 K/uL    Eos # 0.2 0.0 - 0.5 K/uL    Baso # 0.03 0.00 - 0.20 K/uL    nRBC 0 0 /100 WBC    Gran % 65.1 38.0 - 73.0 %    Lymph % 21.9 18.0 - 48.0 %    Mono % 9.9 4.0 - 15.0 %    Eosinophil % 2.1 0.0 - 8.0 %    Basophil % 0.3 0.0 - 1.9 %    Differential Method Automated    Comprehensive metabolic panel   Result Value Ref Range    Sodium 129 (L) 136 - 145 mmol/L    Potassium 4.5 3.5 - 5.1 mmol/L    Chloride 104 95 - 110 mmol/L    CO2 16 (L) 23 - 29 mmol/L    Glucose 140 (H) 70 - 110 mg/dL    BUN 21 8 - 23 mg/dL    Creatinine 1.9 (H) 0.5 - 1.4 mg/dL    Calcium 8.9 8.7 - 10.5 mg/dL    Total Protein 7.0 6.0 - 8.4 g/dL    Albumin 2.5 (L) 3.5 - 5.2 g/dL    Total  Bilirubin 0.2 0.1 - 1.0 mg/dL    Alkaline Phosphatase 114 55 - 135 U/L    AST 53 (H) 10 - 40 U/L    ALT 22 10 - 44 U/L    Anion Gap 9 8 - 16 mmol/L    eGFR 38 (A) >60 mL/min/1.73 m^2   Lactic acid, plasma #1   Result Value Ref Range    Lactate (Lactic Acid) 0.6 0.5 - 2.2 mmol/L     *Note: Due to a large number of results and/or encounters for the requested time period, some results have not been displayed. A complete set of results can be found in Results Review.     Imaging Results:  Imaging Results              CT Head Without Contrast (Final result)  Result time 04/17/23 14:12:04      Final result by Herbert Perez MD (04/17/23 14:12:04)                   Impression:      No acute intracranial CT abnormality.    Mastoid air cell opacification.  Correlation advised.    All CT scans at this facility are performed  using dose modulation techniques as appropriate to performed exam including the following:  automated exposure control; adjustment of mA and/or kV according to the patients size (this includes techniques or standardized protocols for targeted exams where dose is matched to indication/reason for exam: i.e. extremities or head);  iterative reconstruction technique.      Electronically signed by: Herbert Perez  Date:    04/17/2023  Time:    14:12               Narrative:    EXAMINATION:  CT HEAD WITHOUT CONTRAST    CLINICAL HISTORY:  Mental status change, unknown cause;    TECHNIQUE:  Low dose axial CT images obtained throughout the head without intravenous contrast. Sagittal and coronal reconstructions were performed.    COMPARISON:  Multiple priors.    FINDINGS:  Intracranial compartment:    Ventricles and sulci are normal in size for age without evidence of hydrocephalus. No extra-axial blood or fluid collections.    Mild microvascular ischemic change.  No parenchymal mass, hemorrhage, edema or major vascular distribution infarct.    Skull/extracranial contents (limited evaluation): No fracture.  Partial opacification of the mastoid air cells right greater left.  Correlation advised.  Paranasal sinuses are clear.                                       X-Ray Tibia Fibula 2 View Right (Final result)  Result time 04/17/23 13:47:45      Final result by Porter Braxton MD (04/17/23 13:47:45)                   Impression:      As above.      Electronically signed by: Porter Braxton  Date:    04/17/2023  Time:    13:47               Narrative:    EXAMINATION:  XR TIBIA FIBULA 2 VIEW RIGHT    CLINICAL HISTORY:  Unspecified infectious disease    TECHNIQUE:  AP and lateral views of the right tibia and fibula were performed.    COMPARISON:  Right tib fib radiographs 04/05/2023.    FINDINGS:  Below the knee amputation changes.  No acute fracture or suspicious osseous lesion.  Anatomical joint alignment.  No definite subcutaneous gas.  Soft tissue wound extending to the osseous margin of the distal tibia.  No definite associated erosive ostia changes.  Consider further evaluation as warranted.                                       X-Ray Chest AP Portable (Final result)  Result time 04/17/23 13:45:15      Final result by Porter Braxton MD (04/17/23 13:45:15)                   Impression:      No acute abnormality.  Additional findings as above.      Electronically signed by: Porter Braxton  Date:    04/17/2023  Time:    13:45               Narrative:    EXAMINATION:  XR CHEST AP PORTABLE    CLINICAL HISTORY:  Sepsis;    TECHNIQUE:  Single frontal view of the chest was performed.    COMPARISON:  Chest radiograph 04/11/2023.    FINDINGS:  Right PICC central venous catheter tip projecting over the proximal SVC/distal subclavian region.  Interval removal of the right-sided IJ catheter.  The lungs are clear, with normal appearance of pulmonary vasculature and no pleural effusion or pneumothorax.    The cardiac silhouette is stable.  The hilar and mediastinal contours are stable.    Bones are intact.                                               The Emergency Provider reviewed the vital signs and test results, which are outlined above.    ED Discussion     3:01 PM: Discussed pt's case with Dr. Uribe (Vascular Surgery) who recommends admission to Hospital Medicine. Dr. Uribe will see the pt as a consult.    3:02 PM Discussed case with Dr. Irby ([facility] Medicine). Dr. Blair agrees with current care and management of pt and accepts admission. Hospital Medicine Team also recommends consulting Vascular Surgery, as Dr. Mcdonald (Vascular Surgery) had performed the pt's right BKA.  Admitting Service: Hospital Medicine  Admitting Physician: Dr. Blair  Admit to: Obs    3:03 PM: Re-evaluated pt. I have discussed test results, shared treatment plan, and the need for admission with patient and family at bedside. Pt and family express understanding at this time and agree with all information. All questions answered. Pt and family have no further questions or concerns at this time. Pt is ready for admit.         ED Medication(s):  Medications   cefepime in dextrose 5 % 1 gram/50 mL IVPB 1 g (has no administration in time range)   sodium chloride 0.9% bolus 1,000 mL 1,000 mL (has no administration in time range)   vancomycin - pharmacy to dose (has no administration in time range)   sodium chloride 0.9% bolus 1,000 mL 1,000 mL (0 mLs Intravenous Stopped 4/17/23 3986)       New Prescriptions    No medications on file       Medical Decision Making    Medical Decision Making:   Initial Assessment:   Patient with right leg ulcer with bone exposure  Differential Diagnosis:   Ulcer, osteomyelitis, cellulitis  Clinical Tests:   Lab Tests: Ordered and Reviewed  Radiological Study: Ordered and Reviewed  ED Management:  Labs within normal limits for patient no white count and lactate is normal he has a right leg BKA with an open wound ulcer that now exposing bone. On April 13th there was no bone exposed.  Patient will be admitted to hospital medicine  services and Dr. Uribe with vascular surgery has been consulted will see the patient, x-ray showed no acute findings CT head was done because patient said he was slurring but he took Ultram today has never taken it before so he was not sure if he was slurring due to the Ultram.  He is neurovascularly intact otherwise.  No facial droop no dysarthria or dysphasia and moving all extremities with no complications.  Slurring his actually better at this time.         Scribe Attestation:   Scribe #1: I performed the above scribed service and the documentation accurately describes the services I performed. I attest to the accuracy of the note.    Attending:   Physician Attestation Statement for Scribe #1: I, Paige Tran Do, MD, personally performed the services described in this documentation, as scribed by Bradley Wright, in my presence, and it is both accurate and complete.          Clinical Impression       ICD-10-CM ICD-9-CM   1. Ulcer of right lower extremity with bone involvement without evidence of necrosis  L97.916 707.10   2. Infection  B99.9 136.9       Disposition:   Disposition: Placed in Observation  Condition: Fair       Paige Tran Do, MD  04/17/23 1513       Paige Tran Do, MD  04/17/23 1544

## 2023-04-17 NOTE — ASSESSMENT & PLAN NOTE
Presented with right stump pain (8/10) and discharge; (gradual worsening of lesion with findings of exposed bone)  MRI as of 4/9- Findings concerning for early osteomyelitis of the stump of the tibia about the wound margin.  Xray tibia/ fibula right- Below the knee amputation changes.  No acute fracture or suspicious osseous lesion.  Anatomical joint alignment.  No definite subcutaneous gas.  Soft tissue wound extending to the osseous margin of the distal tibia.  No definite associated erosive ostia changes.  S/p Application of large external fixation device to tibia (Left, 8/4/2022); Closed reduction of injury of tibia (Left, 8/4/2022); Removal of external fixation device (Left, 9/17/2022); Angiography of lower extremity (N/A, 9/23/2022); Angiography of lower extremity (N/A, 9/29/2022); and Below knee amputation of lower extremity (Left, 10/6/2022 ;    Recently discharged on 04/13 on IV cefepime, p.o. Flagyl for 6 weeks' course, with end of treatment 5/25; status post PICC line placed on 04/11    Afebrile, no leukocytosis on arrival, no tachycardia, no tachypnea, blood pressure is soft, lactic acid 0.6; received 2 L of IV fluids, antibiotics, blood cultures, vascular consulted; wound care follow up  To continue cefepime, Flagyl, follow up with vascular, NPO except for medications since midnight for any anticipated vascular intervention

## 2023-04-17 NOTE — PHARMACY MED REC
"Admission Medication History     The home medication history was taken by Zeferino Hamilton.    You may go to "Admission" then "Reconcile Home Medications" tabs to review and/or act upon these items.     The home medication list has been updated by the Pharmacy department.   Please read ALL comments highlighted in yellow.   Please address this information as you see fit.    Feel free to contact us if you have any questions or require assistance.      Medications listed below were obtained from: Analytic software- [employer] and Medical records  (Not in a hospital admission)      Zeferino Hamilton  [id number]    Current Outpatient Medications on File Prior to Encounter   Medication Sig Dispense Refill Last Dose    acetaminophen (TYLENOL) 500 MG tablet Take 2 tablets (1,000 mg total) by mouth 3 (three) times daily.  0     amLODIPine (NORVASC) 10 MG tablet Take 10 mg by mouth once daily.       ascorbic acid, vitamin C, (VITAMIN C) 500 MG tablet Take 1 tablet by mouth every morning.       aspirin (ECOTRIN) 81 MG EC tablet Take 1 tablet (81 mg total) by mouth once daily. 90 tablet 1     atorvastatin (LIPITOR) 80 MG tablet Take 80 mg by mouth once daily.       carvediloL (COREG) 3.125 MG tablet Take 3.125 mg by mouth 2 (two) times a day.       cholecalciferol, vitamin D3, 125 mcg (5,000 unit) Tab Take 5,000 Units by mouth every Monday.       dextrose 5 % in water (D5W) 5 % PgBk 50 mL with ceFEPIme 2 gram SolR 2 g Inject 2 g into the vein every 12 (twelve) hours.       gabapentin (NEURONTIN) 300 MG capsule Take 300 mg by mouth 3 (three) times daily.       HUMULIN R REGULAR U-100 INSULN 100 unit/mL injection Inject  into the skin 2 (two) times daily before meals. If blood sugar 200-250= 4 units, 251-300= 6 units, 301-350= 8 units, 351-400= 10 units, 401- 450= 12 units, >450= 14 units and call MD.       insulin detemir U-100, Levemir, 100 unit/mL (3 mL) SubQ InPn pen Inject 12 Units into the skin 2 (two) times daily. 6 mL 11  "    levothyroxine (SYNTHROID) 125 MCG tablet Take 125 mcg by mouth once daily.       meloxicam (MOBIC) 7.5 MG tablet Take 7.5 mg by mouth once daily.       metroNIDAZOLE (FLAGYL) 500 MG tablet Take 1 tablet (500 mg total) by mouth every 8 (eight) hours. 90 tablet 1     multivitamin Tab Take 1 tablet by mouth once daily.       mycophenolate (CELLCEPT) 250 mg Cap Take 250 mg by mouth 2 (two) times daily.       nitroGLYCERIN (NITROSTAT) 0.4 MG SL tablet Place 1 tablet (0.4 mg total) under the tongue every 5 (five) minutes as needed. 30 tablet 0     ondansetron (ZOFRAN) 4 MG tablet Take 4 mg by mouth every 8 (eight) hours as needed for Nausea.       semaglutide (OZEMPIC) 1 mg/dose (2 mg/1.5 mL) PnIj Inject 1 mg under the skin every 7 days. 6 Syringe 3     sertraline (ZOLOFT) 25 MG tablet Take 25 mg by mouth once daily.       tacrolimus (PROGRAF) 0.5 MG Cap Take 2 capsules (1 mg total) by mouth every 12 (twelve) hours. 180 capsule 11     traMADoL (ULTRAM) 50 mg tablet Take 1 tablet (50 mg total) by mouth every 8 (eight) hours as needed for Pain. 15 tablet 0    .

## 2023-04-17 NOTE — SUBJECTIVE & OBJECTIVE
Past Medical History:   Diagnosis Date    DINORAH (acute kidney injury) 2016    Arthritis     CAD in native artery 2019    CHF (congestive heart failure)     Chronic obstructive pulmonary disease 2016    Coronary artery disease involving native coronary artery of native heart without angina pectoris 2016    CRI (chronic renal insufficiency) 2019     donor kidney transplant for DM 16     Induction with Thymo x3 and IV solumedrol to total 875mg  Kidney Biopsy  2016: 16 glomeruli, ACR type 1 AVR type 2, significant microcirculatory changes, c4d negative, No DSA, 5 to10% fibrosis. Treated with thymo x8 2016- no rejection      Diastolic heart failure     Encounter for blood transfusion     ESRD on RRT since 10/2013 10/29/2013    Biopsy proven diabetic nephropathy and lymphoplasmacytic interstitial infiltrate not c/w with AIN (ddx sjogrens or assoc with tamm-horsefall protein extravasation)     GERD (gastroesophageal reflux disease)     History of hepatitis C, s/p successful Rx w/ SVR12 - 2017    Completed 12 weeks harvoni w/ SVR    Hyperlipidemia     Hypertension     PAD (peripheral artery disease) 2019    PIC line (peripherally inserted central catheter) flush     Prophylactic immunotherapy     Proteinuria     PVD (peripheral vascular disease) 2017    RLE BKA CT 16 Extensive atherosclerotic disease of the aorta and mesenteric arteries.     Renal hypertension     Type 2 diabetes mellitus with diabetic neuropathy, with long-term current use of insulin 2016    Vitamin B12 deficiency        Past Surgical History:   Procedure Laterality Date    ANGIOGRAPHY OF LOWER EXTREMITY N/A 2022    Procedure: ANGIOGRAM, LOWER EXTREMITY/left leg;  Surgeon: Donal Mcdonald MD;  Location: [facility] CATH LAB;  Service: Cardiovascular;  Laterality: N/A;    ANGIOGRAPHY OF LOWER EXTREMITY N/A 2022    Procedure: ANGIOGRAM, LOWER EXTREMITY/left leg;  Surgeon: Donal  MD Tamara;  Location: [facility] CATH LAB;  Service: Peripheral Vascular;  Laterality: N/A;  resched from 9/23 pt ready now    AORTOGRAPHY WITH SERIALOGRAPHY N/A 6/14/2018    Procedure: LEFT LEG ANGIOGRAM;  Surgeon: Donal Mcdonald MD;  Location: [facility] CATH LAB;  Service: Vascular;  Laterality: N/A;    APPLICATION OF LARGE EXTERNAL FIXATION DEVICE TO TIBIA Left 8/4/2022    Procedure: APPLICATION, EXTERNAL FIXATION DEVICE, LARGE, TIBIA;  Surgeon: Good Villa MD;  Location: [facility];  Service: Orthopedics;  Laterality: Left;    av bovine graft      Left UE    AV FISTULA PLACEMENT      left UE    BELOW KNEE AMPUTATION OF LOWER EXTREMITY Left 10/6/2022    Procedure: AMPUTATION, BELOW KNEE;  Surgeon: Donal Mcdonald MD;  Location: [facility];  Service: Vascular;  Laterality: Left;    CARDIAC CATHETERIZATION  02/2015    CLOSED REDUCTION OF INJURY OF TIBIA Left 8/4/2022    Procedure: CLOSED REDUCTION, TIBIA;  Surgeon: Good Villa MD;  Location: [facility];  Service: Orthopedics;  Laterality: Left;  Closed reduction left tibial and fibular shaft fractures    CLOSURE OF WOUND Left 9/24/2018    Procedure: CLOSURE, WOUND;  Surgeon: Karla Wheeler DPM;  Location: [facility] OR;  Service: Podiatry;  Laterality: Left;  Secondary Wound closure, extensive    CLOSURE OF WOUND Left 11/5/2018    Procedure: CLOSURE, WOUND;  Surgeon: Karla Wheeler DPM;  Location: [facility];  Service: Podiatry;  Laterality: Left;    DEBRIDEMENT OF MULTIPLE METATARSAL BONES Left 11/5/2018    Procedure: DEBRIDEMENT, METATARSAL BONE, 2 OR MORE BONES;  Surgeon: Karla Wheeler DPM;  Location: [facility];  Service: Podiatry;  Laterality: Left;    EXCISION OF SKIN Left 9/27/2019    Procedure: EXCISION, SKIN;  Surgeon: Lenard Alarcon MD;  Location: [address];  Service: Plastics;  Laterality: Left;  Plastics set, NIMS monitor, [employer]Cell    FOOT AMPUTATION THROUGH METATARSAL Left 9/21/2018    Procedure: AMPUTATION, FOOT, TRANSMETATARSAL;  Surgeon: Karla Wheeler  LASHONDA;  Location: [facility] OR;  Service: Podiatry;  Laterality: Left;    FOOT AMPUTATION THROUGH METATARSAL Left 10/31/2018    Procedure: AMPUTATION, FOOT, TRANSMETATARSAL;  Surgeon: Karla Wheeler DPM;  Location: [facility] OR;  Service: Podiatry;  Laterality: Left;    FOOT AMPUTATION THROUGH METATARSAL Left 11/5/2018    Procedure: AMPUTATION, FOOT, TRANSMETATARSAL;  Surgeon: Karla Wheeler DPM;  Location: [facility] OR;  Service: Podiatry;  Laterality: Left;  revisional transmetatarsal amputation, Left foot    IRRIGATION AND DEBRIDEMENT OF LOWER EXTREMITY Left 10/31/2018    Procedure: IRRIGATION AND DEBRIDEMENT, LOWER EXTREMITY;  Surgeon: Karla Wheeler DPM;  Location: [facility] OR;  Service: Podiatry;  Laterality: Left;    KIDNEY TRANSPLANT  05/21/2016    LEFT HEART CATHETERIZATION Left 7/21/2019    Procedure: CATHETERIZATION, HEART, LEFT;  Surgeon: Andrew Valdez MD;  Location: [facility] CATH LAB;  Service: Cardiology;  Laterality: Left;    LEG AMPUTATION THROUGH KNEE  2011    right LE, started as nail puncture leading to diabetic ulcer    REMOVAL OF EXTERNAL FIXATION DEVICE Left 9/17/2022    Procedure: REMOVAL, EXTERNAL FIXATION DEVICE;  Surgeon: Kathleen Zazueta MD;  Location: [facility] OR;  Service: Orthopedics;  Laterality: Left;    SKIN FULL THICKNESS GRAFT Left 10/7/2019    Procedure: APPLICATION, GRAFT, SKIN, FULL-THICKNESS;  Surgeon: Lenard Alarcon MD;  Location: [address];  Service: Plastics;  Laterality: Left;    SURGICAL REMOVAL OF LESION OF FACE Right 10/7/2019    Procedure: EXCISION, LESION, FACE;  Surgeon: Lenard Alarcon MD;  Location: [address];  Service: Plastics;  Laterality: Right;       Review of patient's allergies indicates:  No Known Allergies    No current facility-administered medications on file prior to encounter.     Current Outpatient Medications on File Prior to Encounter   Medication Sig    acetaminophen (TYLENOL) 500 MG tablet Take 2 tablets (1,000 mg total) by mouth 3 (three) times  daily.    amLODIPine (NORVASC) 10 MG tablet Take 10 mg by mouth once daily.    ascorbic acid, vitamin C, (VITAMIN C) 500 MG tablet Take 1 tablet by mouth every morning.    aspirin (ECOTRIN) 81 MG EC tablet Take 1 tablet (81 mg total) by mouth once daily.    atorvastatin (LIPITOR) 80 MG tablet Take 80 mg by mouth once daily.    carvediloL (COREG) 3.125 MG tablet Take 3.125 mg by mouth 2 (two) times a day.    cholecalciferol, vitamin D3, 125 mcg (5,000 unit) Tab Take 5,000 Units by mouth every Monday.    dextrose 5 % in water (D5W) 5 % PgBk 50 mL with ceFEPIme 2 gram SolR 2 g Inject 2 g into the vein every 12 (twelve) hours.    gabapentin (NEURONTIN) 300 MG capsule Take 300 mg by mouth 3 (three) times daily.    HUMULIN R REGULAR U-100 INSULN 100 unit/mL injection Inject  into the skin 2 (two) times daily before meals. If blood sugar 200-250= 4 units, 251-300= 6 units, 301-350= 8 units, 351-400= 10 units, 401- 450= 12 units, >450= 14 units and call MD.    insulin detemir U-100, Levemir, 100 unit/mL (3 mL) SubQ InPn pen Inject 12 Units into the skin 2 (two) times daily.    levothyroxine (SYNTHROID) 125 MCG tablet Take 125 mcg by mouth once daily.    meloxicam (MOBIC) 7.5 MG tablet Take 7.5 mg by mouth once daily.    metroNIDAZOLE (FLAGYL) 500 MG tablet Take 1 tablet (500 mg total) by mouth every 8 (eight) hours.    multivitamin Tab Take 1 tablet by mouth once daily.    mycophenolate (CELLCEPT) 250 mg Cap Take 250 mg by mouth 2 (two) times daily.    nitroGLYCERIN (NITROSTAT) 0.4 MG SL tablet Place 1 tablet (0.4 mg total) under the tongue every 5 (five) minutes as needed.    ondansetron (ZOFRAN) 4 MG tablet Take 4 mg by mouth every 8 (eight) hours as needed for Nausea.    semaglutide (OZEMPIC) 1 mg/dose (2 mg/1.5 mL) PnIj Inject 1 mg under the skin every 7 days.    sertraline (ZOLOFT) 25 MG tablet Take 25 mg by mouth once daily.    tacrolimus (PROGRAF) 0.5 MG Cap Take 2 capsules (1 mg total) by mouth every 12 (twelve)  hours.    traMADoL (ULTRAM) 50 mg tablet Take 1 tablet (50 mg total) by mouth every 8 (eight) hours as needed for Pain.    [DISCONTINUED] cloNIDine (CATAPRES) 0.1 MG tablet Take 1 tablet (0.1 mg total) by mouth 3 (three) times daily. (Patient not taking: Reported on 8/4/2022)    [DISCONTINUED] furosemide (LASIX) 40 MG tablet Take 1 tablet (40 mg total) by mouth daily as needed (Leg swelling, Nocturnal SOB).    [DISCONTINUED] ipratropium (ATROVENT) 0.02 % nebulizer solution Take 2.5 mLs (500 mcg total) by nebulization 4 (four) times daily. (Patient not taking: Reported on 8/4/2022)    [DISCONTINUED] isosorbide mononitrate (IMDUR) 30 MG 24 hr tablet Take 2 tablets (60 mg total) by mouth once daily. (Patient not taking: Reported on 8/4/2022)    [DISCONTINUED] ketoconazole (NIZORAL) 2 % cream Apply topically 2 (two) times daily.    [DISCONTINUED] levalbuterol (XOPENEX) 1.25 mg/3 mL nebulizer solution Take 3 mLs (1.25 mg total) by nebulization every 6 to 8 hours as needed for Wheezing or Shortness of Breath.    [DISCONTINUED] metoprolol tartrate (LOPRESSOR) 25 MG tablet Take 1 tablet (25 mg total) by mouth 2 (two) times daily.     Family History       Problem Relation (Age of Onset)    Cancer Father    Diabetes Father    Heart failure Father, Mother    Stroke Father          Tobacco Use    Smoking status: Former     Packs/day: 1.00     Years: 40.00     Pack years: 40.00     Types: Cigarettes     Quit date: 1/11/2013     Years since quitting: 10.2    Smokeless tobacco: Never   Substance and Sexual Activity    Alcohol use: Yes     Alcohol/week: 6.0 standard drinks     Types: 6 Cans of beer per week     Comment: seldom    Drug use: No    Sexual activity: Never     Review of Systems    Constitutional:  Negative for chills and fever.   HENT:  Negative for sore throat.    Respiratory:  Negative for shortness of breath.    Cardiovascular:  Negative for chest pain.   Gastrointestinal:  Positive for nausea (intermittent).  Negative for diarrhea and vomiting.   Genitourinary:  Negative for dysuria.   Musculoskeletal:  Positive for myalgias (RLE). Negative for back pain.   Skin:  Positive for wound (R BKA stump). Negative for rash.   Neurological:  Positive for speech difficulty (slurred since taking Tramadol this morning). Negative for dizziness, weakness, light-headedness, numbness and headaches.   Hematological:  Does not bruise/bleed easily.     Objective:     Vital Signs (Most Recent):  Temp: 97.5 °F (36.4 °C) (04/17/23 1241)  Pulse: 85 (04/17/23 1601)  Resp: 16 (04/17/23 1559)  BP: 112/75 (04/17/23 1601)  SpO2: 98 % (04/17/23 1601) Vital Signs (24h Range):  Temp:  [97.5 °F (36.4 °C)] 97.5 °F (36.4 °C)  Pulse:  [82-86] 85  Resp:  [16] 16  SpO2:  [97 %-100 %] 98 %  BP: ()/(51-75) 112/75     Weight: 77 kg (169 lb 12.1 oz)  Body mass index is 23.68 kg/m².    Physical Exam      Constitutional: Patient is in no acute distress. Well-developed and well-nourished.  Head: Atraumatic. Normocephalic.  Eyes: PERRL. EOM intact. Conjunctivae are not pale. No scleral icterus.  ENT: Mucous membranes are moist. Oropharynx is clear and symmetric.    Neck: Supple. Full ROM. No lymphadenopathy.  Cardiovascular: Regular rate. Regular rhythm. No murmurs, rubs, or gallops. Distal pulses are 2+ and symmetric.  Pulmonary/Chest: No respiratory distress. Clear to auscultation bilaterally. No wheezing or rales.  Abdominal: Soft and non-distended.  There is no tenderness.  No rebound, guarding, or rigidity.  Musculoskeletal: Moves all extremities. But has Bilateral BKA. Open wound to R BKA stump with surrounding erythema and bony exposure noted. + exudate noted  Skin: pressure/ sacral decubitus ulcer (present on admission)  Neurological:  Alert, awake, and appropriate.  Slightly slurred speech.   Psychiatric: Normal affect. Good eye contact. Appropriate in content.          Significant Labs:     Results for orders placed or performed during the hospital  encounter of 04/17/23   CBC auto differential   Result Value Ref Range    WBC 9.12 3.90 - 12.70 K/uL    RBC 3.70 (L) 4.60 - 6.20 M/uL    Hemoglobin 9.1 (L) 14.0 - 18.0 g/dL    Hematocrit 29.9 (L) 40.0 - 54.0 %    MCV 81 (L) 82 - 98 fL    MCH 24.6 (L) 27.0 - 31.0 pg    MCHC 30.4 (L) 32.0 - 36.0 g/dL    RDW 15.2 (H) 11.5 - 14.5 %    Platelets 280 150 - 450 K/uL    MPV 9.6 9.2 - 12.9 fL    Immature Granulocytes 0.7 (H) 0.0 - 0.5 %    Gran # (ANC) 5.9 1.8 - 7.7 K/uL    Immature Grans (Abs) 0.06 (H) 0.00 - 0.04 K/uL    Lymph # 2.0 1.0 - 4.8 K/uL    Mono # 0.9 0.3 - 1.0 K/uL    Eos # 0.2 0.0 - 0.5 K/uL    Baso # 0.03 0.00 - 0.20 K/uL    nRBC 0 0 /100 WBC    Gran % 65.1 38.0 - 73.0 %    Lymph % 21.9 18.0 - 48.0 %    Mono % 9.9 4.0 - 15.0 %    Eosinophil % 2.1 0.0 - 8.0 %    Basophil % 0.3 0.0 - 1.9 %    Differential Method Automated    Comprehensive metabolic panel   Result Value Ref Range    Sodium 129 (L) 136 - 145 mmol/L    Potassium 4.5 3.5 - 5.1 mmol/L    Chloride 104 95 - 110 mmol/L    CO2 16 (L) 23 - 29 mmol/L    Glucose 140 (H) 70 - 110 mg/dL    BUN 21 8 - 23 mg/dL    Creatinine 1.9 (H) 0.5 - 1.4 mg/dL    Calcium 8.9 8.7 - 10.5 mg/dL    Total Protein 7.0 6.0 - 8.4 g/dL    Albumin 2.5 (L) 3.5 - 5.2 g/dL    Total Bilirubin 0.2 0.1 - 1.0 mg/dL    Alkaline Phosphatase 114 55 - 135 U/L    AST 53 (H) 10 - 40 U/L    ALT 22 10 - 44 U/L    Anion Gap 9 8 - 16 mmol/L    eGFR 38 (A) >60 mL/min/1.73 m^2   Lactic acid, plasma #1   Result Value Ref Range    Lactate (Lactic Acid) 0.6 0.5 - 2.2 mmol/L   Urinalysis, Reflex to Urine Culture Urine, Clean Catch    Specimen: Urine   Result Value Ref Range    Specimen UA Urine, Clean Catch     Color, UA Yellow Yellow, Straw, Jessa    Appearance, UA Hazy (A) Clear    pH, UA 6.0 5.0 - 8.0    Specific Gravity, UA 1.010 1.005 - 1.030    Protein, UA 1+ (A) Negative    Glucose, UA Negative Negative    Ketones, UA Negative Negative    Bilirubin (UA) Negative Negative    Occult Blood UA 1+ (A)  Negative    Nitrite, UA Negative Negative    Urobilinogen, UA Negative <2.0 EU/dL    Leukocytes, UA 3+ (A) Negative   Urinalysis Microscopic   Result Value Ref Range    RBC, UA 8 (H) 0 - 4 /hpf    WBC, UA >100 (H) 0 - 5 /hpf    WBC Clumps, UA Many (A) None-Rare    Bacteria Rare None-Occ /hpf    Squam Epithel, UA 1 /hpf    Hyaline Casts, UA 0 0-1/lpf /lpf    Microscopic Comment SEE COMMENT      *Note: Due to a large number of results and/or encounters for the requested time period, some results have not been displayed. A complete set of results can be found in Results Review.        Significant Imaging:     Imaging Results              CT Head Without Contrast (Final result)  Result time 04/17/23 14:12:04      Final result by Herbert Perez MD (04/17/23 14:12:04)                   Impression:      No acute intracranial CT abnormality.    Mastoid air cell opacification.  Correlation advised.    All CT scans at this facility are performed  using dose modulation techniques as appropriate to performed exam including the following:  automated exposure control; adjustment of mA and/or kV according to the patients size (this includes techniques or standardized protocols for targeted exams where dose is matched to indication/reason for exam: i.e. extremities or head);  iterative reconstruction technique.      Electronically signed by: Herbert Perez  Date:    04/17/2023  Time:    14:12               Narrative:    EXAMINATION:  CT HEAD WITHOUT CONTRAST    CLINICAL HISTORY:  Mental status change, unknown cause;    TECHNIQUE:  Low dose axial CT images obtained throughout the head without intravenous contrast. Sagittal and coronal reconstructions were performed.    COMPARISON:  Multiple priors.    FINDINGS:  Intracranial compartment:    Ventricles and sulci are normal in size for age without evidence of hydrocephalus. No extra-axial blood or fluid collections.    Mild microvascular ischemic change.  No parenchymal mass,  hemorrhage, edema or major vascular distribution infarct.    Skull/extracranial contents (limited evaluation): No fracture. Partial opacification of the mastoid air cells right greater left.  Correlation advised.  Paranasal sinuses are clear.                                       X-Ray Tibia Fibula 2 View Right (Final result)  Result time 04/17/23 13:47:45      Final result by Porter Braxton MD (04/17/23 13:47:45)                   Impression:      As above.      Electronically signed by: Porter Braxton  Date:    04/17/2023  Time:    13:47               Narrative:    EXAMINATION:  XR TIBIA FIBULA 2 VIEW RIGHT    CLINICAL HISTORY:  Unspecified infectious disease    TECHNIQUE:  AP and lateral views of the right tibia and fibula were performed.    COMPARISON:  Right tib fib radiographs 04/05/2023.    FINDINGS:  Below the knee amputation changes.  No acute fracture or suspicious osseous lesion.  Anatomical joint alignment.  No definite subcutaneous gas.  Soft tissue wound extending to the osseous margin of the distal tibia.  No definite associated erosive ostia changes.  Consider further evaluation as warranted.                                       X-Ray Chest AP Portable (Final result)  Result time 04/17/23 13:45:15      Final result by Porter Braxton MD (04/17/23 13:45:15)                   Impression:      No acute abnormality.  Additional findings as above.      Electronically signed by: Porter Braxton  Date:    04/17/2023  Time:    13:45               Narrative:    EXAMINATION:  XR CHEST AP PORTABLE    CLINICAL HISTORY:  Sepsis;    TECHNIQUE:  Single frontal view of the chest was performed.    COMPARISON:  Chest radiograph 04/11/2023.    FINDINGS:  Right PICC central venous catheter tip projecting over the proximal SVC/distal subclavian region.  Interval removal of the right-sided IJ catheter.  The lungs are clear, with normal appearance of pulmonary vasculature and no pleural effusion or  pneumothorax.    The cardiac silhouette is stable.  The hilar and mediastinal contours are stable.    Bones are intact.

## 2023-04-17 NOTE — ASSESSMENT & PLAN NOTE
Bicarb of 16, anion gap 9, likely secondary to underlying kidney disease   Monitor and consider replacement as needed   Follow up with Nephrology

## 2023-04-17 NOTE — H&P
[facility] [place] - Emergency Dept.  [facility] Medicine  History & Physical    Patient Name: Lavelle Ladd  MRN: [id number]  Patient Class: OP- Observation  Admission Date: 4/17/2023  Attending Physician: Jean Blair, *   Primary Care Provider: Primary Doctor No         Patient information was obtained from patient and ER records.     Subjective:     Principal Problem:Ulcer of right lower extremity with bone involvement without evidence of necrosis    Chief Complaint:   Chief Complaint   Patient presents with    Leg Pain     R leg wound with pain.         HPI: Lavelle Ladd is a 67 y.o. male patient with a PMHx of CAD, CHF, s/p kidney transplant  on 5/21/16, COPD, hyperlipidemia, hypertension, peripheral arterial disease, peripheral vascular disease, Application of large external fixation device to tibia (Left, 8/4/2022); Closed reduction of injury of tibia (Left, 8/4/2022); Removal of external fixation device (Left, 9/17/2022); Angiography of lower extremity (N/A, 9/23/2022); Angiography of lower extremity (N/A, 9/29/2022); and Below knee amputation of lower extremity (Left, 10/6/2022) who presents to the Emergency Department for RLE wound check. Pt reports a wound to his R BKA stump for the past week, but states that it has worsened since this morning.  Was evaluated by home health nurse, and has been recommended to go to ED due to likely findings of exposure of bone on examination.  Stated that Symptoms are constant and moderate in severity. No mitigating or exacerbating factors reported. Associated sxs include RLE pain and intermittent nausea. Pt also reports slurred speech since taking Tramadol for the first time this morning. Patient denies any fever, chills, vomiting, SOB, CP, weakness, numbness, dizziness, headache, and all other sxs at this time.   Stated improvement in diarrhea, stated having 2-3 episodes of formed stools, stated being compliant with medications/antibiotics at home.        Of note  patient has been hospitalized from 04/05 to 4/13- with septic shock/  bacteremia , wound infection ,C,diff and osteo ; initially has been admitted to ICU due to requirement of pressors, has been downgraded to floor and subsequently got discharged on 04/13 on 6 weeks of antibiotics including IV cefepime and p.o. vanc with end of treatment 05/25/2023.    Also patient has been started on p.o. vanc for findings of positive C diff on 04/06/2023 and has been recommended to take for total 10 days of duration with end of treatment 04/16/2023. ID has been consulted;   Status post PICC line placement on 04/11/2023.  Also during recent hospitalization for underlying renal transplant with DINORAH findings with creatinine up to 2.2 on admission, nephrology has been consulted, CellCept has been held due to underlying active infection, nephrology recommended Prograf 1 mg in a.m., 0.5 mg q.h.s. to maintain levels between 4-6.    On arrival to ED, patient remained afebrile, no tachycardia, no tachypnea, blood pressure soft, lactic acid 0.6, received 2 L of IV fluids, blood cultures x2 ordered and antibiotics were started within 4 hours duration.  Vascular surgery consulted;   For the findings of slurred speech on arrival, patient underwent CT head while in ED, showed no acute intracranial findings.      Pt has been   admitted multiple times since 8 /4/22  in multiples facility including  [facility], [facility]  and  [facility] .      Lives alone at home, stated that he gets support from his sister, ;  Code status:  Full code (alert and oriented x3)                      Past Medical History:   Diagnosis Date    DINORAH (acute kidney injury) 9/25/2016    Arthritis     CAD in native artery 7/21/2019    CHF (congestive heart failure)     Chronic obstructive pulmonary disease 9/12/2016    Coronary artery disease involving native coronary artery of native heart without angina pectoris 7/1/2016    CRI (chronic renal  insufficiency) 2019     donor kidney transplant for DM 16     Induction with Thymo x3 and IV solumedrol to total 875mg  Kidney Biopsy  2016: 16 glomeruli, ACR type 1 AVR type 2, significant microcirculatory changes, c4d negative, No DSA, 5 to10% fibrosis. Treated with thymo x8 2016- no rejection      Diastolic heart failure     Encounter for blood transfusion     ESRD on RRT since 10/2013 10/29/2013    Biopsy proven diabetic nephropathy and lymphoplasmacytic interstitial infiltrate not c/w with AIN (ddx sjogrens or assoc with tamm-horsefall protein extravasation)     GERD (gastroesophageal reflux disease)     History of hepatitis C, s/p successful Rx w/ SVR12 - 2017    Completed 12 weeks harvoni w/ SVR    Hyperlipidemia     Hypertension     PAD (peripheral artery disease) 2019    PIC line (peripherally inserted central catheter) flush     Prophylactic immunotherapy     Proteinuria     PVD (peripheral vascular disease) 2017    RLE BKA CT 16 Extensive atherosclerotic disease of the aorta and mesenteric arteries.     Renal hypertension     Type 2 diabetes mellitus with diabetic neuropathy, with long-term current use of insulin 2016    Vitamin B12 deficiency        Past Surgical History:   Procedure Laterality Date    ANGIOGRAPHY OF LOWER EXTREMITY N/A 2022    Procedure: ANGIOGRAM, LOWER EXTREMITY/left leg;  Surgeon: Donal Mcdonald MD;  Location: [facility] CATH LAB;  Service: Cardiovascular;  Laterality: N/A;    ANGIOGRAPHY OF LOWER EXTREMITY N/A 2022    Procedure: ANGIOGRAM, LOWER EXTREMITY/left leg;  Surgeon: Donal Mcdonald MD;  Location: [facility] CATH LAB;  Service: Peripheral Vascular;  Laterality: N/A;  resched from  pt ready now    AORTOGRAPHY WITH SERIALOGRAPHY N/A 2018    Procedure: LEFT LEG ANGIOGRAM;  Surgeon: Donal Mcdonald MD;  Location: [facility] CATH LAB;  Service: Vascular;  Laterality: N/A;    APPLICATION OF LARGE EXTERNAL  FIXATION DEVICE TO TIBIA Left 8/4/2022    Procedure: APPLICATION, EXTERNAL FIXATION DEVICE, LARGE, TIBIA;  Surgeon: Good Villa MD;  Location: [facility] OR;  Service: Orthopedics;  Laterality: Left;    av bovine graft      Left UE    AV FISTULA PLACEMENT      left UE    BELOW KNEE AMPUTATION OF LOWER EXTREMITY Left 10/6/2022    Procedure: AMPUTATION, BELOW KNEE;  Surgeon: Donal Mcdonald MD;  Location: [facility] OR;  Service: Vascular;  Laterality: Left;    CARDIAC CATHETERIZATION  02/2015    CLOSED REDUCTION OF INJURY OF TIBIA Left 8/4/2022    Procedure: CLOSED REDUCTION, TIBIA;  Surgeon: Good Villa MD;  Location: [facility] OR;  Service: Orthopedics;  Laterality: Left;  Closed reduction left tibial and fibular shaft fractures    CLOSURE OF WOUND Left 9/24/2018    Procedure: CLOSURE, WOUND;  Surgeon: Karla Wheeler DPM;  Location: [facility] OR;  Service: Podiatry;  Laterality: Left;  Secondary Wound closure, extensive    CLOSURE OF WOUND Left 11/5/2018    Procedure: CLOSURE, WOUND;  Surgeon: Karla Wheeler DPM;  Location: [facility] OR;  Service: Podiatry;  Laterality: Left;    DEBRIDEMENT OF MULTIPLE METATARSAL BONES Left 11/5/2018    Procedure: DEBRIDEMENT, METATARSAL BONE, 2 OR MORE BONES;  Surgeon: Karla Wheeler DPM;  Location: [facility] OR;  Service: Podiatry;  Laterality: Left;    EXCISION OF SKIN Left 9/27/2019    Procedure: EXCISION, SKIN;  Surgeon: Lenard Alarcon MD;  Location: [address];  Service: Plastics;  Laterality: Left;  Plastics set, NIMS monitor, ACell    FOOT AMPUTATION THROUGH METATARSAL Left 9/21/2018    Procedure: AMPUTATION, FOOT, TRANSMETATARSAL;  Surgeon: Karla Wheeler DPM;  Location: [facility] OR;  Service: Podiatry;  Laterality: Left;    FOOT AMPUTATION THROUGH METATARSAL Left 10/31/2018    Procedure: AMPUTATION, FOOT, TRANSMETATARSAL;  Surgeon: Karla Wheeler DPM;  Location: [facility] OR;  Service: Podiatry;  Laterality: Left;    FOOT AMPUTATION THROUGH METATARSAL Left  11/5/2018    Procedure: AMPUTATION, FOOT, TRANSMETATARSAL;  Surgeon: Karla Wheeler DPM;  Location: [facility] OR;  Service: Podiatry;  Laterality: Left;  revisional transmetatarsal amputation, Left foot    IRRIGATION AND DEBRIDEMENT OF LOWER EXTREMITY Left 10/31/2018    Procedure: IRRIGATION AND DEBRIDEMENT, LOWER EXTREMITY;  Surgeon: Karla Wheeler DPM;  Location: [facility] OR;  Service: Podiatry;  Laterality: Left;    KIDNEY TRANSPLANT  05/21/2016    LEFT HEART CATHETERIZATION Left 7/21/2019    Procedure: CATHETERIZATION, HEART, LEFT;  Surgeon: Andrew Valdez MD;  Location: [facility] CATH LAB;  Service: Cardiology;  Laterality: Left;    LEG AMPUTATION THROUGH KNEE  2011    right LE, started as nail puncture leading to diabetic ulcer    REMOVAL OF EXTERNAL FIXATION DEVICE Left 9/17/2022    Procedure: REMOVAL, EXTERNAL FIXATION DEVICE;  Surgeon: Kathleen Zazueta MD;  Location: [facility] OR;  Service: Orthopedics;  Laterality: Left;    SKIN FULL THICKNESS GRAFT Left 10/7/2019    Procedure: APPLICATION, GRAFT, SKIN, FULL-THICKNESS;  Surgeon: Lenard Alarcon MD;  Location: [address];  Service: Plastics;  Laterality: Left;    SURGICAL REMOVAL OF LESION OF FACE Right 10/7/2019    Procedure: EXCISION, LESION, FACE;  Surgeon: Lenard Alarcon MD;  Location: [facility] OR [address] [place] [address];  Service: Plastics;  Laterality: Right;       Review of patient's allergies indicates:  No Known Allergies    No current facility-administered medications on file prior to encounter.     Current Outpatient Medications on File Prior to Encounter   Medication Sig    acetaminophen (TYLENOL) 500 MG tablet Take 2 tablets (1,000 mg total) by mouth 3 (three) times daily.    amLODIPine (NORVASC) 10 MG tablet Take 10 mg by mouth once daily.    ascorbic acid, vitamin C, (VITAMIN C) 500 MG tablet Take 1 tablet by mouth every morning.    aspirin (ECOTRIN) 81 MG EC tablet Take 1 tablet (81 mg total) by mouth once daily.    atorvastatin (LIPITOR) 80 MG tablet  Take 80 mg by mouth once daily.    carvediloL (COREG) 3.125 MG tablet Take 3.125 mg by mouth 2 (two) times a day.    cholecalciferol, vitamin D3, 125 mcg (5,000 unit) Tab Take 5,000 Units by mouth every Monday.    dextrose 5 % in water (D5W) 5 % PgBk 50 mL with ceFEPIme 2 gram SolR 2 g Inject 2 g into the vein every 12 (twelve) hours.    gabapentin (NEURONTIN) 300 MG capsule Take 300 mg by mouth 3 (three) times daily.    HUMULIN R REGULAR U-100 INSULN 100 unit/mL injection Inject  into the skin 2 (two) times daily before meals. If blood sugar 200-250= 4 units, 251-300= 6 units, 301-350= 8 units, 351-400= 10 units, 401- 450= 12 units, >450= 14 units and call MD.    insulin detemir U-100, Levemir, 100 unit/mL (3 mL) SubQ InPn pen Inject 12 Units into the skin 2 (two) times daily.    levothyroxine (SYNTHROID) 125 MCG tablet Take 125 mcg by mouth once daily.    meloxicam (MOBIC) 7.5 MG tablet Take 7.5 mg by mouth once daily.    metroNIDAZOLE (FLAGYL) 500 MG tablet Take 1 tablet (500 mg total) by mouth every 8 (eight) hours.    multivitamin Tab Take 1 tablet by mouth once daily.    mycophenolate (CELLCEPT) 250 mg Cap Take 250 mg by mouth 2 (two) times daily.    nitroGLYCERIN (NITROSTAT) 0.4 MG SL tablet Place 1 tablet (0.4 mg total) under the tongue every 5 (five) minutes as needed.    ondansetron (ZOFRAN) 4 MG tablet Take 4 mg by mouth every 8 (eight) hours as needed for Nausea.    semaglutide (OZEMPIC) 1 mg/dose (2 mg/1.5 mL) PnIj Inject 1 mg under the skin every 7 days.    sertraline (ZOLOFT) 25 MG tablet Take 25 mg by mouth once daily.    tacrolimus (PROGRAF) 0.5 MG Cap Take 2 capsules (1 mg total) by mouth every 12 (twelve) hours.    traMADoL (ULTRAM) 50 mg tablet Take 1 tablet (50 mg total) by mouth every 8 (eight) hours as needed for Pain.    [DISCONTINUED] cloNIDine (CATAPRES) 0.1 MG tablet Take 1 tablet (0.1 mg total) by mouth 3 (three) times daily. (Patient not taking: Reported on 8/4/2022)     [DISCONTINUED] furosemide (LASIX) 40 MG tablet Take 1 tablet (40 mg total) by mouth daily as needed (Leg swelling, Nocturnal SOB).    [DISCONTINUED] ipratropium (ATROVENT) 0.02 % nebulizer solution Take 2.5 mLs (500 mcg total) by nebulization 4 (four) times daily. (Patient not taking: Reported on 8/4/2022)    [DISCONTINUED] isosorbide mononitrate (IMDUR) 30 MG 24 hr tablet Take 2 tablets (60 mg total) by mouth once daily. (Patient not taking: Reported on 8/4/2022)    [DISCONTINUED] ketoconazole (NIZORAL) 2 % cream Apply topically 2 (two) times daily.    [DISCONTINUED] levalbuterol (XOPENEX) 1.25 mg/3 mL nebulizer solution Take 3 mLs (1.25 mg total) by nebulization every 6 to 8 hours as needed for Wheezing or Shortness of Breath.    [DISCONTINUED] metoprolol tartrate (LOPRESSOR) 25 MG tablet Take 1 tablet (25 mg total) by mouth 2 (two) times daily.     Family History       Problem Relation (Age of Onset)    Cancer Father    Diabetes Father    Heart failure Father, Mother    Stroke Father          Tobacco Use    Smoking status: Former     Packs/day: 1.00     Years: 40.00     Pack years: 40.00     Types: Cigarettes     Quit date: 1/11/2013     Years since quitting: 10.2    Smokeless tobacco: Never   Substance and Sexual Activity    Alcohol use: Yes     Alcohol/week: 6.0 standard drinks     Types: 6 Cans of beer per week     Comment: seldom    Drug use: No    Sexual activity: Never     Review of Systems    Constitutional:  Negative for chills and fever.   HENT:  Negative for sore throat.    Respiratory:  Negative for shortness of breath.    Cardiovascular:  Negative for chest pain.   Gastrointestinal:  Positive for nausea (intermittent). Negative for diarrhea and vomiting.   Genitourinary:  Negative for dysuria.   Musculoskeletal:  Positive for myalgias (RLE). Negative for back pain.   Skin:  Positive for wound (R BKA stump). Negative for rash.   Neurological:  Positive for speech difficulty (slurred since  taking Tramadol this morning). Negative for dizziness, weakness, light-headedness, numbness and headaches.   Hematological:  Does not bruise/bleed easily.     Objective:     Vital Signs (Most Recent):  Temp: 97.5 °F (36.4 °C) (04/17/23 1241)  Pulse: 85 (04/17/23 1601)  Resp: 16 (04/17/23 1559)  BP: 112/75 (04/17/23 1601)  SpO2: 98 % (04/17/23 1601) Vital Signs (24h Range):  Temp:  [97.5 °F (36.4 °C)] 97.5 °F (36.4 °C)  Pulse:  [82-86] 85  Resp:  [16] 16  SpO2:  [97 %-100 %] 98 %  BP: ()/(51-75) 112/75     Weight: 77 kg (169 lb 12.1 oz)  Body mass index is 23.68 kg/m².    Physical Exam      Constitutional: Patient is in no acute distress. Well-developed and well-nourished.  Head: Atraumatic. Normocephalic.  Eyes: PERRL. EOM intact. Conjunctivae are not pale. No scleral icterus.  ENT: Mucous membranes are moist. Oropharynx is clear and symmetric.    Neck: Supple. Full ROM. No lymphadenopathy.  Cardiovascular: Regular rate. Regular rhythm. No murmurs, rubs, or gallops. Distal pulses are 2+ and symmetric.  Pulmonary/Chest: No respiratory distress. Clear to auscultation bilaterally. No wheezing or rales.  Abdominal: Soft and non-distended.  There is no tenderness.  No rebound, guarding, or rigidity.  Musculoskeletal: Moves all extremities. But has Bilateral BKA. Open wound to R BKA stump with surrounding erythema and bony exposure noted. + exudate noted  Skin: pressure/ sacral decubitus ulcer (present on admission)  Neurological:  Alert, awake, and appropriate.  Slightly slurred speech.   Psychiatric: Normal affect. Good eye contact. Appropriate in content.          Significant Labs:     Results for orders placed or performed during the hospital encounter of 04/17/23   CBC auto differential   Result Value Ref Range    WBC 9.12 3.90 - 12.70 K/uL    RBC 3.70 (L) 4.60 - 6.20 M/uL    Hemoglobin 9.1 (L) 14.0 - 18.0 g/dL    Hematocrit 29.9 (L) 40.0 - 54.0 %    MCV 81 (L) 82 - 98 fL    MCH 24.6 (L) 27.0 - 31.0 pg    MCHC  30.4 (L) 32.0 - 36.0 g/dL    RDW 15.2 (H) 11.5 - 14.5 %    Platelets 280 150 - 450 K/uL    MPV 9.6 9.2 - 12.9 fL    Immature Granulocytes 0.7 (H) 0.0 - 0.5 %    Gran # (ANC) 5.9 1.8 - 7.7 K/uL    Immature Grans (Abs) 0.06 (H) 0.00 - 0.04 K/uL    Lymph # 2.0 1.0 - 4.8 K/uL    Mono # 0.9 0.3 - 1.0 K/uL    Eos # 0.2 0.0 - 0.5 K/uL    Baso # 0.03 0.00 - 0.20 K/uL    nRBC 0 0 /100 WBC    Gran % 65.1 38.0 - 73.0 %    Lymph % 21.9 18.0 - 48.0 %    Mono % 9.9 4.0 - 15.0 %    Eosinophil % 2.1 0.0 - 8.0 %    Basophil % 0.3 0.0 - 1.9 %    Differential Method Automated    Comprehensive metabolic panel   Result Value Ref Range    Sodium 129 (L) 136 - 145 mmol/L    Potassium 4.5 3.5 - 5.1 mmol/L    Chloride 104 95 - 110 mmol/L    CO2 16 (L) 23 - 29 mmol/L    Glucose 140 (H) 70 - 110 mg/dL    BUN 21 8 - 23 mg/dL    Creatinine 1.9 (H) 0.5 - 1.4 mg/dL    Calcium 8.9 8.7 - 10.5 mg/dL    Total Protein 7.0 6.0 - 8.4 g/dL    Albumin 2.5 (L) 3.5 - 5.2 g/dL    Total Bilirubin 0.2 0.1 - 1.0 mg/dL    Alkaline Phosphatase 114 55 - 135 U/L    AST 53 (H) 10 - 40 U/L    ALT 22 10 - 44 U/L    Anion Gap 9 8 - 16 mmol/L    eGFR 38 (A) >60 mL/min/1.73 m^2   Lactic acid, plasma #1   Result Value Ref Range    Lactate (Lactic Acid) 0.6 0.5 - 2.2 mmol/L   Urinalysis, Reflex to Urine Culture Urine, Clean Catch    Specimen: Urine   Result Value Ref Range    Specimen UA Urine, Clean Catch     Color, UA Yellow Yellow, Straw, Jessa    Appearance, UA Hazy (A) Clear    pH, UA 6.0 5.0 - 8.0    Specific Gravity, UA 1.010 1.005 - 1.030    Protein, UA 1+ (A) Negative    Glucose, UA Negative Negative    Ketones, UA Negative Negative    Bilirubin (UA) Negative Negative    Occult Blood UA 1+ (A) Negative    Nitrite, UA Negative Negative    Urobilinogen, UA Negative <2.0 EU/dL    Leukocytes, UA 3+ (A) Negative   Urinalysis Microscopic   Result Value Ref Range    RBC, UA 8 (H) 0 - 4 /hpf    WBC, UA >100 (H) 0 - 5 /hpf    WBC Clumps, UA Many (A) None-Rare    Bacteria  Rare None-Occ /hpf    Squam Epithel, UA 1 /hpf    Hyaline Casts, UA 0 0-1/lpf /lpf    Microscopic Comment SEE COMMENT      *Note: Due to a large number of results and/or encounters for the requested time period, some results have not been displayed. A complete set of results can be found in Results Review.        Significant Imaging:     Imaging Results              CT Head Without Contrast (Final result)  Result time 04/17/23 14:12:04      Final result by Herbert Perez MD (04/17/23 14:12:04)                   Impression:      No acute intracranial CT abnormality.    Mastoid air cell opacification.  Correlation advised.    All CT scans at this facility are performed  using dose modulation techniques as appropriate to performed exam including the following:  automated exposure control; adjustment of mA and/or kV according to the patients size (this includes techniques or standardized protocols for targeted exams where dose is matched to indication/reason for exam: i.e. extremities or head);  iterative reconstruction technique.      Electronically signed by: Herbert Perez  Date:    04/17/2023  Time:    14:12               Narrative:    EXAMINATION:  CT HEAD WITHOUT CONTRAST    CLINICAL HISTORY:  Mental status change, unknown cause;    TECHNIQUE:  Low dose axial CT images obtained throughout the head without intravenous contrast. Sagittal and coronal reconstructions were performed.    COMPARISON:  Multiple priors.    FINDINGS:  Intracranial compartment:    Ventricles and sulci are normal in size for age without evidence of hydrocephalus. No extra-axial blood or fluid collections.    Mild microvascular ischemic change.  No parenchymal mass, hemorrhage, edema or major vascular distribution infarct.    Skull/extracranial contents (limited evaluation): No fracture. Partial opacification of the mastoid air cells right greater left.  Correlation advised.  Paranasal sinuses are clear.                                        X-Ray Tibia Fibula 2 View Right (Final result)  Result time 04/17/23 13:47:45      Final result by Porter Braxton MD (04/17/23 13:47:45)                   Impression:      As above.      Electronically signed by: Porter Braxton  Date:    04/17/2023  Time:    13:47               Narrative:    EXAMINATION:  XR TIBIA FIBULA 2 VIEW RIGHT    CLINICAL HISTORY:  Unspecified infectious disease    TECHNIQUE:  AP and lateral views of the right tibia and fibula were performed.    COMPARISON:  Right tib fib radiographs 04/05/2023.    FINDINGS:  Below the knee amputation changes.  No acute fracture or suspicious osseous lesion.  Anatomical joint alignment.  No definite subcutaneous gas.  Soft tissue wound extending to the osseous margin of the distal tibia.  No definite associated erosive ostia changes.  Consider further evaluation as warranted.                                       X-Ray Chest AP Portable (Final result)  Result time 04/17/23 13:45:15      Final result by Porter Braxton MD (04/17/23 13:45:15)                   Impression:      No acute abnormality.  Additional findings as above.      Electronically signed by: Porter Braxton  Date:    04/17/2023  Time:    13:45               Narrative:    EXAMINATION:  XR CHEST AP PORTABLE    CLINICAL HISTORY:  Sepsis;    TECHNIQUE:  Single frontal view of the chest was performed.    COMPARISON:  Chest radiograph 04/11/2023.    FINDINGS:  Right PICC central venous catheter tip projecting over the proximal SVC/distal subclavian region.  Interval removal of the right-sided IJ catheter.  The lungs are clear, with normal appearance of pulmonary vasculature and no pleural effusion or pneumothorax.    The cardiac silhouette is stable.  The hilar and mediastinal contours are stable.    Bones are intact.                                       Assessment/Plan:     * Ulcer of right lower extremity with bone involvement without evidence of necrosis  Presented with right stump pain  (8/10) and discharge; (gradual worsening of lesion with findings of exposed bone)  MRI as of 4/9- Findings concerning for early osteomyelitis of the stump of the tibia about the wound margin.  Xray tibia/ fibula right- Below the knee amputation changes.  No acute fracture or suspicious osseous lesion.  Anatomical joint alignment.  No definite subcutaneous gas.  Soft tissue wound extending to the osseous margin of the distal tibia.  No definite associated erosive ostia changes.  S/p Application of large external fixation device to tibia (Left, 8/4/2022); Closed reduction of injury of tibia (Left, 8/4/2022); Removal of external fixation device (Left, 9/17/2022); Angiography of lower extremity (N/A, 9/23/2022); Angiography of lower extremity (N/A, 9/29/2022); and Below knee amputation of lower extremity (Left, 10/6/2022 ;    Recently discharged on 04/13 on IV cefepime, p.o. Flagyl for 6 weeks' course, with end of treatment 5/25; status post PICC line placed on 04/11    Afebrile, no leukocytosis on arrival, no tachycardia, no tachypnea, blood pressure is soft, lactic acid 0.6; received 2 L of IV fluids, antibiotics, blood cultures, vascular consulted; wound care follow up  To continue cefepime, Flagyl, follow up with vascular, NPO except for medications since midnight for any anticipated vascular intervention       Renal transplant, status post  History of renal transplant in 2016   Creatinine 1.9, (creatinine 1.9 at the time of discharge as of 4/13)  Avoid nephrotoxins,  Renal dose of medications   CellCept held due to underlying infection, ordered Prograf 1 mg q.a.m., 0.5 mg q.h.s. as recommended by Nephrology during most recent admission   Prograf level in a.m.   Nephrology consult                  Hyponatremia  Sodium 129  Baseline sodium runs between 129-133 likely secondary to underlying findings of cirrhosis as of ultrasound abdomen from 09/2022  Follow up on serum osmolality, urine osmolality, lytes, TSH,  nephrology follow up         Metabolic acidosis  Bicarb of 16, anion gap 9, likely secondary to underlying kidney disease   Monitor and consider replacement as needed   Follow up with Nephrology        Chronic obstructive pulmonary disease  Currently not in acute exacerbation   Nebs p.r.n.      Hyperlipidemia   Resume home dose      Type 2 diabetes mellitus with hyperglycemia, with long-term current use of insulin  Patient's FSGs are on current medication regimen.  Last A1c reviewed-   Lab Results   Component Value Date    HGBA1C 6.7 (H) 04/05/2023     Most recent fingerstick glucose reviewed- No results for input(s): POCTGLUCOSE in the last 24 hours.  Current correctional scale   anti-hyperglycemic dose as follows-   Antihyperglycemics (From admission, onward)    Start     Stop Route Frequency Ordered    04/18/23 2100  insulin detemir U-100 (Levemir) pen 12 Units         -- SubQ 2 times daily 04/17/23 1643 04/17/23 2100  insulin detemir U-100 (Levemir) pen 12 Units         04/18 2059 SubQ Nightly 04/17/23 1643 04/17/23 1749  insulin aspart U-100 pen 0-5 Units         -- SubQ Before meals & nightly PRN 04/17/23 1649        Hold Oral hypoglycemics while patient is in the hospital.    Glucose POC, sliding scale insulin, hypoglycemia protocol   At home- on Levemir 12 units b.i.d., sliding scale insulin   Ordered 12 units tonight, hold morning dose for NPO status for any anticipated vascular interventions, ;     Essential hypertension  Blood pressure 90s over 50s at the time of admission, hold home medications including amlodipine, Coreg   Monitor and resume medications accordingly        VTE Risk Mitigation (From admission, onward)         Ordered     IP VTE HIGH RISK PATIENT  Once         04/17/23 1649     Place sequential compression device  Until discontinued         04/17/23 1649                   On 04/17/2023, patient should be placed in hospital observation services under my care.        Jean DE LA PAZ  MD Rossy  Department of Hospital Medicine  [facility] - Emergency Dept.

## 2023-04-17 NOTE — ASSESSMENT & PLAN NOTE
History of renal transplant in 2016   Creatinine 1.9, (creatinine 1.9 at the time of discharge as of 4/13)  Avoid nephrotoxins,  Renal dose of medications   CellCept held due to underlying infection, ordered Prograf 1 mg q.a.m., 0.5 mg q.h.s. as recommended by Nephrology during most recent admission   Prograf level in a.m.   Nephrology consult

## 2023-04-17 NOTE — HPI
Lavelle Ladd is a 67 y.o. male patient with a PMHx of CAD, CHF, s/p kidney transplant  on 5/21/16, COPD, hyperlipidemia, hypertension, peripheral arterial disease, peripheral vascular disease, Application of large external fixation device to tibia (Left, 8/4/2022); Closed reduction of injury of tibia (Left, 8/4/2022); Removal of external fixation device (Left, 9/17/2022); Angiography of lower extremity (N/A, 9/23/2022); Angiography of lower extremity (N/A, 9/29/2022); and Below knee amputation of lower extremity (Left, 10/6/2022) who presents to the Emergency Department for RLE wound check. Pt reports a wound to his R BKA stump for the past week, but states that it has worsened since this morning.  Was evaluated by home health nurse, and has been recommended to go to ED due to likely findings of exposure of bone on examination.  Stated that Symptoms are constant and moderate in severity. No mitigating or exacerbating factors reported. Associated sxs include RLE pain and intermittent nausea. Pt also reports slurred speech since taking Tramadol for the first time this morning. Patient denies any fever, chills, vomiting, SOB, CP, weakness, numbness, dizziness, headache, and all other sxs at this time.   Stated improvement in diarrhea, stated having 2-3 episodes of formed stools, stated being compliant with medications/antibiotics at home.        Of note patient has been hospitalized from 04/05 to 4/13- with septic shock/  bacteremia , wound infection ,C,diff and osteo ; initially has been admitted to ICU due to requirement of pressors, has been downgraded to floor and subsequently got discharged on 04/13 on 6 weeks of antibiotics including IV cefepime and p.o. vanc with end of treatment 05/25/2023.    Also patient has been started on p.o. vanc for findings of positive C diff on 04/06/2023 and has been recommended to take for total 10 days of duration with end of treatment 04/16/2023. ID has been consulted;   Status  post PICC line placement on 04/11/2023.  Also during recent hospitalization for underlying renal transplant with DINORAH findings with creatinine up to 2.2 on admission, nephrology has been consulted, CellCept has been held due to underlying active infection, nephrology recommended Prograf 1 mg in a.m., 0.5 mg q.h.s. to maintain levels between 4-6.    On arrival to ED, patient remained afebrile, no tachycardia, no tachypnea, blood pressure soft, lactic acid 0.6, received 2 L of IV fluids, blood cultures x2 ordered and antibiotics were started within 4 hours duration.  Vascular surgery consulted;   For the findings of slurred speech on arrival, patient underwent CT head while in ED, showed no acute intracranial findings.      Pt has been   admitted multiple times since 8 /4/22  in multiples facility including  [facility], [facility]  and  [facility] .      Lives alone at home, stated that he gets support from his sister, ;  Code status:  Full code (alert and oriented x3)

## 2023-04-17 NOTE — ASSESSMENT & PLAN NOTE
Sodium 129  Baseline sodium runs between 129-133 likely secondary to underlying findings of cirrhosis as of ultrasound abdomen from 09/2022  Follow up on serum osmolality, urine osmolality, lytes, TSH, nephrology follow up

## 2023-04-17 NOTE — ASSESSMENT & PLAN NOTE
Blood pressure 90s over 50s at the time of admission, hold home medications including amlodipine, Coreg   Monitor and resume medications accordingly

## 2023-04-17 NOTE — ASSESSMENT & PLAN NOTE
Patient's FSGs are on current medication regimen.  Last A1c reviewed-   Lab Results   Component Value Date    HGBA1C 6.7 (H) 04/05/2023     Most recent fingerstick glucose reviewed- No results for input(s): POCTGLUCOSE in the last 24 hours.  Current correctional scale   anti-hyperglycemic dose as follows-   Antihyperglycemics (From admission, onward)    Start     Stop Route Frequency Ordered    04/18/23 2100  insulin detemir U-100 (Levemir) pen 12 Units         -- SubQ 2 times daily 04/17/23 1643    04/17/23 2100  insulin detemir U-100 (Levemir) pen 12 Units         04/18 2059 SubQ Nightly 04/17/23 1643 04/17/23 1749  insulin aspart U-100 pen 0-5 Units         -- SubQ Before meals & nightly PRN 04/17/23 1649        Hold Oral hypoglycemics while patient is in the hospital.    Glucose POC, sliding scale insulin, hypoglycemia protocol   At home- on Levemir 12 units b.i.d., sliding scale insulin   Ordered 12 units tonight, hold morning dose for NPO status for any anticipated vascular interventions, ;

## 2023-04-18 NOTE — PLAN OF CARE
"   04/18/23 0959   Readmission   Why were you hospitalized in the last 30 days? C-diff   Why were you readmitted? Related to previous admission   When you left the hospital how did you feel? "good"   When you left the hospital where did you go? Home with Home Health   Did patient/caregiver refused recommended DC plan? No   Tell me about what happened between when you left the hospital and the day you returned. worsening stump wound infection   When did you start not feeling well? a few days ago   Did you try to manage your symptoms your self? No   Did you call anyone? No   Why?  Nurse saw pt and advised to go to ER   Did you try to see or did see a doctor or nurse before you came? Yes   Were you seen? Yes   Did you have  a follow-up appointment on discharge? Yes   Did you go? No   Why?   (Re-admitted)   Was this a planned readmission? No       "

## 2023-04-18 NOTE — PT/OT/SLP EVAL
Occupational Therapy   Evaluation    Name: Lavelle Ladd  MRN: [id number]  Admitting Diagnosis: Ulcer of right lower extremity with bone involvement without evidence of necrosis  Recent Surgery: * No surgery found *      Recommendations:     Discharge Recommendations: nursing facility, skilled, home health OT  Discharge Equipment Recommendations:  none  Barriers to discharge:  Decreased caregiver support    Assessment:     Lavelle Ladd is a 67 y.o. male with a medical diagnosis of Ulcer of right lower extremity with bone involvement without evidence of necrosis.  He presents with the following performance deficits affecting function: impaired endurance, weakness, impaired self care skills, impaired functional mobility, gait instability, impaired balance, decreased lower extremity function, decreased safety awareness, impaired skin.      Rehab Prognosis: Good; patient would benefit from acute skilled OT services to address these deficits and reach maximum level of function.       Plan:     Patient to be seen 2 x/week to address the above listed problems via self-care/home management, therapeutic activities, therapeutic exercises  Plan of Care Expires: 05/02/23  Plan of Care Reviewed with: patient    Subjective     Chief Complaint: wound on RLE  Patient/Family Comments/goals: pt wants to ambulate with prostheses only.    Occupational Profile:  Living Environment: lives alone in a 1 story house with ramp to enter. Pt's sister check in on pt.  Previous level of function: Pt Mod (I) with ADLs, t/fs to and from w/c, and functional mobility; primarily using power chair.  Roles and Routines: does not drive or work  Equipment Used at Home: power chair, wheelchair, prosthesis, walker, rolling, shower chair, bedside commode  Assistance upon Discharge: limited assist from sister    Pain/Comfort:  Pain Rating 1: 0/10  Objective:     Communicated with: Nurse and epic chart review prior to session.  Patient found HOB elevated  with peripheral IV, telemetry upon OT entry to room.    General Precautions: Standard, fall, special contact (bilateral BKA)  Orthopedic Precautions: N/A  Braces:  (LLE prosthetic)  Respiratory Status: Room air    Occupational Performance:    Bed Mobility:    Patient completed Rolling/Turning to Right with supervision  Patient completed Supine to Sit with supervision  Patient completed Sit to Supine with supervision  Forward scoot to EOB with SPV.  Lateral scoot to R side with SPV.    Functional Mobility/Transfers:  Pt declining standing at this time d/t recently taking medication.    Activities of Daily Living:  Lower Body Dressing: modified independence sudhir/doff LLE prosthesis supine in bed  Toileting: modified independence pt using urinal while supine in bed    Cognitive/Visual Perceptual:  Cognitive/Psychosocial Skills:     -       Oriented to: Person, Place, Time, and Situation   -       Follows Commands/attention:Follows multistep  commands  -       Communication: clear/fluent  -       Safety awareness/insight to disability: intact     Physical Exam:  Skin integrity: Wound RLE  Upper Extremity Range of Motion:     -       Right Upper Extremity: WFL  -       Left Upper Extremity: WFL  Upper Extremity Strength:    -       Right Upper Extremity: 4+/5 grossly  -       Left Upper Extremity: 4+/5 grossly   Strength:    -       Right Upper Extremity: WFL  -       Left Upper Extremity: WFL    AMPAC 6 Click ADL:  AMPAC Total Score: 20    Treatment & Education:  Patient educated on role of OT in acute setting and benefits of participation. Educated on techniques to use to increase independence and decrease fall risk with functional transfers. Educated on importance of EOB/OOB activity and calling for A to transfer to/from bed and meet needs. Encouraged completion of B UE AROM therex throughout the day to tolerance to increase functional strength and activity tolerance.    Educated patient on importance of increased  tolerance to upright position and direct impact on CV endurance and strength. Patient encouraged to utilize elevated HOB to simulate chair position until able to safely complete chair T/F. Patient given a minimum goal of majority of the day to be spent in upright, especially with all meals. Patient stated understanding and in agreement with POC.     Patient left HOB elevated with all lines intact, call button in reach, and bed alarm on    GOALS:   Multidisciplinary Problems       Occupational Therapy Goals          Problem: Occupational Therapy    Goal Priority Disciplines Outcome Interventions   Occupational Therapy Goal     OT, PT/OT     Description: Goals to be met by: 23     Patient will increase functional independence with ADLs by performing:    Toileting from bedside commode with Supervision for hygiene and clothing management.   Stand pivot transfers with Modified Garibaldi using LLE prosthetic and RW.  Upper extremity exercise program x20 reps per handout, with independence to maintain BUE strength.                         History:     Past Medical History:   Diagnosis Date    DINORAH (acute kidney injury) 2016    Arthritis     CAD in native artery 2019    CHF (congestive heart failure)     Chronic obstructive pulmonary disease 2016    Coronary artery disease involving native coronary artery of native heart without angina pectoris 2016    CRI (chronic renal insufficiency) 2019     donor kidney transplant for DM 16     Induction with Thymo x3 and IV solumedrol to total 875mg  Kidney Biopsy  2016: 16 glomeruli, ACR type 1 AVR type 2, significant microcirculatory changes, c4d negative, No DSA, 5 to10% fibrosis. Treated with thymo x8 2016- no rejection      Diastolic heart failure     Encounter for blood transfusion     ESRD on RRT since 10/2013 10/29/2013    Biopsy proven diabetic nephropathy and lymphoplasmacytic interstitial infiltrate not c/w with AIN (ddx  sjogrens or assoc with tamm-horsefall protein extravasation)     GERD (gastroesophageal reflux disease)     History of hepatitis C, s/p successful Rx w/ SVR12 - 4/2017 4/5/2017    Completed 12 weeks harvoni w/ SVR    Hyperlipidemia     Hypertension     PAD (peripheral artery disease) 7/21/2019    PIC line (peripherally inserted central catheter) flush     Prophylactic immunotherapy     Proteinuria     PVD (peripheral vascular disease) 6/26/2017    RLE BKA CT 12/11/16 Extensive atherosclerotic disease of the aorta and mesenteric arteries.     Renal hypertension     Type 2 diabetes mellitus with diabetic neuropathy, with long-term current use of insulin 12/1/2016    Vitamin B12 deficiency          Past Surgical History:   Procedure Laterality Date    ANGIOGRAPHY OF LOWER EXTREMITY N/A 9/23/2022    Procedure: ANGIOGRAM, LOWER EXTREMITY/left leg;  Surgeon: Donal Mcdonald MD;  Location: [facility] CATH LAB;  Service: Cardiovascular;  Laterality: N/A;    ANGIOGRAPHY OF LOWER EXTREMITY N/A 9/29/2022    Procedure: ANGIOGRAM, LOWER EXTREMITY/left leg;  Surgeon: Donal Mcdonald MD;  Location: [facility] CATH LAB;  Service: Peripheral Vascular;  Laterality: N/A;  resched from 9/23 pt ready now    AORTOGRAPHY WITH SERIALOGRAPHY N/A 6/14/2018    Procedure: LEFT LEG ANGIOGRAM;  Surgeon: Donal Mcdonald MD;  Location: [facility] CATH LAB;  Service: Vascular;  Laterality: N/A;    APPLICATION OF LARGE EXTERNAL FIXATION DEVICE TO TIBIA Left 8/4/2022    Procedure: APPLICATION, EXTERNAL FIXATION DEVICE, LARGE, TIBIA;  Surgeon: Good Villa MD;  Location: [facility] OR;  Service: Orthopedics;  Laterality: Left;    av bovine graft      Left UE    AV FISTULA PLACEMENT      left UE    BELOW KNEE AMPUTATION OF LOWER EXTREMITY Left 10/6/2022    Procedure: AMPUTATION, BELOW KNEE;  Surgeon: Donal Mcdonald MD;  Location: [facility] OR;  Service: Vascular;  Laterality: Left;    CARDIAC CATHETERIZATION  02/2015    CLOSED REDUCTION OF INJURY OF TIBIA Left 8/4/2022    Procedure:  CLOSED REDUCTION, TIBIA;  Surgeon: Good Villa MD;  Location: [facility] OR;  Service: Orthopedics;  Laterality: Left;  Closed reduction left tibial and fibular shaft fractures    CLOSURE OF WOUND Left 9/24/2018    Procedure: CLOSURE, WOUND;  Surgeon: Karla Wheeler DPM;  Location: [facility] OR;  Service: Podiatry;  Laterality: Left;  Secondary Wound closure, extensive    CLOSURE OF WOUND Left 11/5/2018    Procedure: CLOSURE, WOUND;  Surgeon: Karla Wheeler DPM;  Location: [facility] OR;  Service: Podiatry;  Laterality: Left;    DEBRIDEMENT OF MULTIPLE METATARSAL BONES Left 11/5/2018    Procedure: DEBRIDEMENT, METATARSAL BONE, 2 OR MORE BONES;  Surgeon: Karla Wheeler DPM;  Location: [facility] OR;  Service: Podiatry;  Laterality: Left;    EXCISION OF SKIN Left 9/27/2019    Procedure: EXCISION, SKIN;  Surgeon: Lenard Alarcon MD;  Location: [address];  Service: Plastics;  Laterality: Left;  Plastics set, NIMS monitor, ACell    FOOT AMPUTATION THROUGH METATARSAL Left 9/21/2018    Procedure: AMPUTATION, FOOT, TRANSMETATARSAL;  Surgeon: Karla Wheeler DPM;  Location: [facility] OR;  Service: Podiatry;  Laterality: Left;    FOOT AMPUTATION THROUGH METATARSAL Left 10/31/2018    Procedure: AMPUTATION, FOOT, TRANSMETATARSAL;  Surgeon: Karla Wheeler DPM;  Location: [facility] OR;  Service: Podiatry;  Laterality: Left;    FOOT AMPUTATION THROUGH METATARSAL Left 11/5/2018    Procedure: AMPUTATION, FOOT, TRANSMETATARSAL;  Surgeon: Karla Wheeler DPM;  Location: [facility] OR;  Service: Podiatry;  Laterality: Left;  revisional transmetatarsal amputation, Left foot    IRRIGATION AND DEBRIDEMENT OF LOWER EXTREMITY Left 10/31/2018    Procedure: IRRIGATION AND DEBRIDEMENT, LOWER EXTREMITY;  Surgeon: Karla Wheeler DPM;  Location: [facility] OR;  Service: Podiatry;  Laterality: Left;    KIDNEY TRANSPLANT  05/21/2016    LEFT HEART CATHETERIZATION Left 7/21/2019    Procedure: CATHETERIZATION, HEART, LEFT;  Surgeon: Andrew Valdez MD;   Location: [facility] CATH LAB;  Service: Cardiology;  Laterality: Left;    LEG AMPUTATION THROUGH KNEE  2011    right LE, started as nail puncture leading to diabetic ulcer    REMOVAL OF EXTERNAL FIXATION DEVICE Left 9/17/2022    Procedure: REMOVAL, EXTERNAL FIXATION DEVICE;  Surgeon: Kathleen Zazueta MD;  Location: [facility] OR;  Service: Orthopedics;  Laterality: Left;    SKIN FULL THICKNESS GRAFT Left 10/7/2019    Procedure: APPLICATION, GRAFT, SKIN, FULL-THICKNESS;  Surgeon: Lenard Alarcon MD;  Location: [address];  Service: Plastics;  Laterality: Left;    SURGICAL REMOVAL OF LESION OF FACE Right 10/7/2019    Procedure: EXCISION, LESION, FACE;  Surgeon: Lenard Alarcon MD;  Location: [address];  Service: Plastics;  Laterality: Right;       Time Tracking:     OT Date of Treatment: 04/18/23  OT Start Time: 1030  OT Stop Time: 1055  OT Total Time (min): 25 min    Billable Minutes:Evaluation 15  Therapeutic Activity 10    4/18/2023  Yuli Solomon OT

## 2023-04-18 NOTE — CONSULTS
Pharmacokinetic Initial Assessment: IV Vancomycin    Assessment/Plan:    Initiate intravenous vancomycin with loading dose of 1500 mg once with subsequent doses when random concentrations are less than 20 mcg/mL  Desired empiric serum trough concentration is 15 to 20 mcg/mL  Draw vancomycin random level on 4/18 at 1200.  Pharmacy will continue to follow and monitor vancomycin.      Please contact pharmacy at extension [phone] with any questions regarding this assessment.     Thank you for the consult,   Samantha Powell, PharmD 4/17/2023 8:45 PM         Patient brief summary:  Lavelle Ladd is a 67 y.o. male initiated on antimicrobial therapy with IV Vancomycin for treatment of suspected bone/joint infection    Drug Allergies:   Review of patient's allergies indicates:  No Known Allergies    Actual Body Weight:   77 kg    Renal Function:   Estimated Creatinine Clearance: 39.1 mL/min (A) (based on SCr of 1.9 mg/dL (H)).,     Dialysis Method (if applicable):  N/A    CBC (last 72 hours):  Recent Labs   Lab Result Units 04/17/23  1045 04/17/23  1346   WBC K/uL 10.66 9.12   Hemoglobin g/dL 10.0* 9.1*   Hematocrit % 34.2* 29.9*   Platelets K/uL 320 280   Gran % % 66.6 65.1   Lymph % % 20.4 21.9   Mono % % 9.8 9.9   Eosinophil % % 2.2 2.1   Basophil % % 0.4 0.3   Differential Method  Automated Automated       Metabolic Panel (last 72 hours):  Recent Labs   Lab Result Units 04/17/23  1045 04/17/23  1346 04/17/23  1534   Sodium mmol/L 130* 129*  --    Potassium mmol/L 5.1 4.5  --    Chloride mmol/L 103 104  --    CO2 mmol/L 16* 16*  --    Glucose mg/dL 120* 140*  --    Glucose, UA   --   --  Negative   BUN mg/dL 22 21  --    Creatinine mg/dL 1.9* 1.9*  --    Albumin g/dL  --  2.5*  --    Total Bilirubin mg/dL  --  0.2  --    Alkaline Phosphatase U/L  --  114  --    AST U/L  --  53*  --    ALT U/L  --  22  --        Drug levels (last 3 results):  No results for input(s): VANCOMYCINRA, VANCORANDOM, VANCOMYCINPE, VANCOPEAK,  VANCOMYCINTR, VANCOTROUGH in the last 72 hours.    Microbiologic Results:  Microbiology Results (last 7 days)       Procedure Component Value Units Date/Time    Urine culture [[id number]] Collected: 04/17/23 1534    Order Status: No result Specimen: Urine Updated: 04/17/23 1548    Blood culture x two cultures. Draw prior to antibiotics. [[id number]] Collected: 04/17/23 1347    Order Status: Sent Specimen: Blood from Other (Specify) Updated: 04/17/23 1348    Blood culture x two cultures. Draw prior to antibiotics. [[id number]] Collected: 04/17/23 1347    Order Status: Sent Specimen: Blood from Other (Specify) Updated: 04/17/23 1348

## 2023-04-18 NOTE — ASSESSMENT & PLAN NOTE
Sodium 129  Baseline sodium runs between 129-133 likely secondary to underlying findings of cirrhosis as of ultrasound abdomen from 09/2022  Follow up on serum osmolality, urine osmolality, lytes, TSH, nephrology follow up     4/18  Na improved, 133   Nephrology following
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
## 2023-04-18 NOTE — PT/OT/SLP EVAL
Physical Therapy Evaluation    Patient Name:  Lavelle Ladd   MRN:  [id number]    Recommendations:     Discharge Recommendations: nursing facility, skilled, home health PT   Discharge Equipment Recommendations: none   Barriers to discharge: Decreased caregiver support    Assessment:     Lavelle Ladd is a 67 y.o. male admitted with a medical diagnosis of Ulcer of right lower extremity with bone involvement without evidence of necrosis.  He presents with the following impairments/functional limitations: weakness, impaired endurance, decreased ROM, decreased lower extremity function, impaired skin, pain, impaired balance, gait instability, impaired functional mobility, impaired self care skills .    Rehab Prognosis: Good; patient would benefit from acute skilled PT services to address these deficits and reach maximum level of function.    Recent Surgery: * No surgery found *      Plan:     During this hospitalization, patient to be seen 3 x/week to address the identified rehab impairments via therapeutic activities, therapeutic exercises, wheelchair management/training and progress toward the following goals:    Plan of Care Expires:  05/02/23    Subjective     Chief Complaint: PAIN R LE   Patient/Family Comments/goals: WALK AGAIN WITH PROSTHESIS   Pain/Comfort:  Pain Rating 1: 0/10  Pain Rating Post-Intervention 1: 0/10    Patients cultural, spiritual, Evangelical conflicts given the current situation:      Living Environment:  PT LIVES AT HOME ALONE IN A ONE STORY HOME WITH NO STEPS AND RAMP TO ENTER HOME   Prior to admission, patients level of function was MOD I WITH T/F TO WC . PT REPORTED IT WAS DIFFICULT THOUGH.  Equipment used at home: wheelchair, power chair, walker, rolling, bedside commode, slide board.  DME owned (not currently used): rolling walker.  Upon discharge, patient will have assistance from UNKNOWN .    Objective:     Communicated with NURSE GRECO AND [facility] CHART REVIEW  prior to session.   "Patient found supine with peripheral IV, telemetry  upon PT entry to room.    General Precautions: Standard, fall, contact  Orthopedic Precautions:N/A   Braces: N/A  Respiratory Status: Room air    Exams:  Cognitive Exam:  Patient is oriented to Person, Place, Time, and Situation  RLE ROM: IMPAIRED KNEE EXTENSION WITH PAIN   RLE Strength: NA D/T PAIN   LLE ROM: WFL  LLE Strength: LIMITED     Functional Mobility:  Bed Mobility:     Rolling Right: supervision  Scooting: supervision  Supine to Sit: supervision  Sit to Supine: supervision      AM-PAC 6 CLICK MOBILITY  Total Score:12       Treatment & Education:  PT REQUESTED NOT STANDING TODAY OR OOB TO CHAIR DOING TO FEELING "WOOZY" FROM PAIN MEDS. PT SCOOT TO HOB WITH SBA. PT DONNED AND DOFFED PROSTHESIS IND. PT RETURNED SUP IN BED WITH S. PT EDUCATED ON ROLE OF P.T. AND PT EDUCATED ON RISK FOR FALLS DUE TO GENERALIZED WEAKNESS, EDUCATED ON "CALL DON'T FALL", ENCOURAGED TO CALL FOR ASSISTANCE WITH ALL NEEDS SUCH AS BED<>CHAIR TRANSFERS OR TRIPS TO BATHROOM.       Patient left HOB elevated with all lines intact, call button in reach, and bed alarm on.    GOALS:   Multidisciplinary Problems       Physical Therapy Goals          Problem: Physical Therapy    Goal Priority Disciplines Outcome Goal Variances Interventions   Physical Therapy Goal     PT, PT/OT      Description: LT23  1. PT WILL COMPLETE BED MOBILITY IND  2. PT WILL SQUAD PIVOT T/F TO CHAIR WITH MIN A  3. PT WILL COMPLETE B LE TE X 20 REPS FOR STRENGTHENING.                        History:     Past Medical History:   Diagnosis Date    DINORAH (acute kidney injury) 2016    Arthritis     CAD in native artery 2019    CHF (congestive heart failure)     Chronic obstructive pulmonary disease 2016    Coronary artery disease involving native coronary artery of native heart without angina pectoris 2016    CRI (chronic renal insufficiency) 2019     donor kidney transplant for DM " 5/21/16     Induction with Thymo x3 and IV solumedrol to total 875mg  Kidney Biopsy  5/31/2016: 16 glomeruli, ACR type 1 AVR type 2, significant microcirculatory changes, c4d negative, No DSA, 5 to10% fibrosis. Treated with thymo x8 6/21/2016- no rejection      Diastolic heart failure     Encounter for blood transfusion     ESRD on RRT since 10/2013 10/29/2013    Biopsy proven diabetic nephropathy and lymphoplasmacytic interstitial infiltrate not c/w with AIN (ddx sjogrens or assoc with tamm-horsefall protein extravasation)     GERD (gastroesophageal reflux disease)     History of hepatitis C, s/p successful Rx w/ SVR12 - 4/2017 4/5/2017    Completed 12 weeks harvoni w/ SVR    Hyperlipidemia     Hypertension     PAD (peripheral artery disease) 7/21/2019    PIC line (peripherally inserted central catheter) flush     Prophylactic immunotherapy     Proteinuria     PVD (peripheral vascular disease) 6/26/2017    RLE BKA CT 12/11/16 Extensive atherosclerotic disease of the aorta and mesenteric arteries.     Renal hypertension     Type 2 diabetes mellitus with diabetic neuropathy, with long-term current use of insulin 12/1/2016    Vitamin B12 deficiency        Past Surgical History:   Procedure Laterality Date    ANGIOGRAPHY OF LOWER EXTREMITY N/A 9/23/2022    Procedure: ANGIOGRAM, LOWER EXTREMITY/left leg;  Surgeon: Donal Mcdonald MD;  Location: [facility] CATH LAB;  Service: Cardiovascular;  Laterality: N/A;    ANGIOGRAPHY OF LOWER EXTREMITY N/A 9/29/2022    Procedure: ANGIOGRAM, LOWER EXTREMITY/left leg;  Surgeon: Donal Mcdonald MD;  Location: [facility] CATH LAB;  Service: Peripheral Vascular;  Laterality: N/A;  resched from 9/23 pt ready now    AORTOGRAPHY WITH SERIALOGRAPHY N/A 6/14/2018    Procedure: LEFT LEG ANGIOGRAM;  Surgeon: Dnoal Mcdonald MD;  Location: [facility] CATH LAB;  Service: Vascular;  Laterality: N/A;    APPLICATION OF LARGE EXTERNAL FIXATION DEVICE TO TIBIA Left 8/4/2022    Procedure: APPLICATION, EXTERNAL FIXATION DEVICE,  LARGE, TIBIA;  Surgeon: Good Villa MD;  Location: [facility] OR;  Service: Orthopedics;  Laterality: Left;    av bovine graft      Left UE    AV FISTULA PLACEMENT      left UE    BELOW KNEE AMPUTATION OF LOWER EXTREMITY Left 10/6/2022    Procedure: AMPUTATION, BELOW KNEE;  Surgeon: Donal Mcdonald MD;  Location: [facility];  Service: Vascular;  Laterality: Left;    CARDIAC CATHETERIZATION  02/2015    CLOSED REDUCTION OF INJURY OF TIBIA Left 8/4/2022    Procedure: CLOSED REDUCTION, TIBIA;  Surgeon: Good Villa MD;  Location: [facility] OR;  Service: Orthopedics;  Laterality: Left;  Closed reduction left tibial and fibular shaft fractures    CLOSURE OF WOUND Left 9/24/2018    Procedure: CLOSURE, WOUND;  Surgeon: Karla Wheeler DPM;  Location: [facility] OR;  Service: Podiatry;  Laterality: Left;  Secondary Wound closure, extensive    CLOSURE OF WOUND Left 11/5/2018    Procedure: CLOSURE, WOUND;  Surgeon: Karla Wheeler DPM;  Location: [facility] OR;  Service: Podiatry;  Laterality: Left;    DEBRIDEMENT OF MULTIPLE METATARSAL BONES Left 11/5/2018    Procedure: DEBRIDEMENT, METATARSAL BONE, 2 OR MORE BONES;  Surgeon: Karla Wheeler DPM;  Location: [facility] OR;  Service: Podiatry;  Laterality: Left;    EXCISION OF SKIN Left 9/27/2019    Procedure: EXCISION, SKIN;  Surgeon: Lenard Alarcon MD;  Location: [address];  Service: Plastics;  Laterality: Left;  Plastics set, NIMS monitor, ACell    FOOT AMPUTATION THROUGH METATARSAL Left 9/21/2018    Procedure: AMPUTATION, FOOT, TRANSMETATARSAL;  Surgeon: Karla Wheeler DPM;  Location: [facility];  Service: Podiatry;  Laterality: Left;    FOOT AMPUTATION THROUGH METATARSAL Left 10/31/2018    Procedure: AMPUTATION, FOOT, TRANSMETATARSAL;  Surgeon: Karla Wheeler DPM;  Location: [facility];  Service: Podiatry;  Laterality: Left;    FOOT AMPUTATION THROUGH METATARSAL Left 11/5/2018    Procedure: AMPUTATION, FOOT, TRANSMETATARSAL;  Surgeon: Karla Wheeler DPM;  Location: [facility]  OR;  Service: Podiatry;  Laterality: Left;  revisional transmetatarsal amputation, Left foot    IRRIGATION AND DEBRIDEMENT OF LOWER EXTREMITY Left 10/31/2018    Procedure: IRRIGATION AND DEBRIDEMENT, LOWER EXTREMITY;  Surgeon: Karla Wheeler DPM;  Location: [facility] OR;  Service: Podiatry;  Laterality: Left;    KIDNEY TRANSPLANT  05/21/2016    LEFT HEART CATHETERIZATION Left 7/21/2019    Procedure: CATHETERIZATION, HEART, LEFT;  Surgeon: Andrew Valdez MD;  Location: [facility] CATH LAB;  Service: Cardiology;  Laterality: Left;    LEG AMPUTATION THROUGH KNEE  2011    right LE, started as nail puncture leading to diabetic ulcer    REMOVAL OF EXTERNAL FIXATION DEVICE Left 9/17/2022    Procedure: REMOVAL, EXTERNAL FIXATION DEVICE;  Surgeon: Kathleen Zazueta MD;  Location: [facility] OR;  Service: Orthopedics;  Laterality: Left;    SKIN FULL THICKNESS GRAFT Left 10/7/2019    Procedure: APPLICATION, GRAFT, SKIN, FULL-THICKNESS;  Surgeon: Lenard Alarcon MD;  Location: [address];  Service: Plastics;  Laterality: Left;    SURGICAL REMOVAL OF LESION OF FACE Right 10/7/2019    Procedure: EXCISION, LESION, FACE;  Surgeon: Lenard Alarcon MD;  Location: [address];  Service: Plastics;  Laterality: Right;       Time Tracking:     PT Received On: 04/18/23  PT Start Time: 1028     PT Stop Time: 1058  PT Total Time (min): 30 min     Billable Minutes: Evaluation 15 and Therapeutic Activity 15      04/18/2023

## 2023-04-18 NOTE — ASSESSMENT & PLAN NOTE
Presented with right stump pain (8/10) and discharge; (gradual worsening of lesion with findings of exposed bone)  MRI as of 4/9- Findings concerning for early osteomyelitis of the stump of the tibia about the wound margin.  Xray tibia/ fibula right- Below the knee amputation changes.  No acute fracture or suspicious osseous lesion.  Anatomical joint alignment.  No definite subcutaneous gas.  Soft tissue wound extending to the osseous margin of the distal tibia.  No definite associated erosive ostia changes.  S/p Application of large external fixation device to tibia (Left, 8/4/2022); Closed reduction of injury of tibia (Left, 8/4/2022); Removal of external fixation device (Left, 9/17/2022); Angiography of lower extremity (N/A, 9/23/2022); Angiography of lower extremity (N/A, 9/29/2022); and Below knee amputation of lower extremity (Left, 10/6/2022 ;    Recently discharged on 04/13 on IV cefepime, p.o. Flagyl for 6 weeks' course, with end of treatment 5/25; status post PICC line placed on 04/11 4/18  - Vascular following- rec R AKA  - continue antibiotics for now     Afebrile, no leukocytosis on arrival, no tachycardia, no tachypnea, blood pressure is soft, lactic acid 0.6; received 2 L of IV fluids, antibiotics, blood cultures, vascular consulted; wound care follow up  To continue cefepime, Flagyl, follow up with vascular, NPO except for medications since midnight for any anticipated vascular intervention

## 2023-04-18 NOTE — SUBJECTIVE & OBJECTIVE
Past Medical History:   Diagnosis Date    DINORAH (acute kidney injury) 2016    Arthritis     CAD in native artery 2019    CHF (congestive heart failure)     Chronic obstructive pulmonary disease 2016    Coronary artery disease involving native coronary artery of native heart without angina pectoris 2016    CRI (chronic renal insufficiency) 2019     donor kidney transplant for DM 16     Induction with Thymo x3 and IV solumedrol to total 875mg  Kidney Biopsy  2016: 16 glomeruli, ACR type 1 AVR type 2, significant microcirculatory changes, c4d negative, No DSA, 5 to10% fibrosis. Treated with thymo x8 2016- no rejection      Diastolic heart failure     Encounter for blood transfusion     ESRD on RRT since 10/2013 10/29/2013    Biopsy proven diabetic nephropathy and lymphoplasmacytic interstitial infiltrate not c/w with AIN (ddx sjogrens or assoc with tamm-horsefall protein extravasation)     GERD (gastroesophageal reflux disease)     History of hepatitis C, s/p successful Rx w/ SVR12 - 2017    Completed 12 weeks harvoni w/ SVR    Hyperlipidemia     Hypertension     PAD (peripheral artery disease) 2019    PIC line (peripherally inserted central catheter) flush     Prophylactic immunotherapy     Proteinuria     PVD (peripheral vascular disease) 2017    RLE BKA CT 16 Extensive atherosclerotic disease of the aorta and mesenteric arteries.     Renal hypertension     Type 2 diabetes mellitus with diabetic neuropathy, with long-term current use of insulin 2016    Vitamin B12 deficiency        Past Surgical History:   Procedure Laterality Date    ANGIOGRAPHY OF LOWER EXTREMITY N/A 2022    Procedure: ANGIOGRAM, LOWER EXTREMITY/left leg;  Surgeon: Donal Mcdonald MD;  Location: [facility] CATH LAB;  Service: Cardiovascular;  Laterality: N/A;    ANGIOGRAPHY OF LOWER EXTREMITY N/A 2022    Procedure: ANGIOGRAM, LOWER EXTREMITY/left leg;  Surgeon: Donal  MD Tamara;  Location: [facility] CATH LAB;  Service: Peripheral Vascular;  Laterality: N/A;  resched from 9/23 pt ready now    AORTOGRAPHY WITH SERIALOGRAPHY N/A 6/14/2018    Procedure: LEFT LEG ANGIOGRAM;  Surgeon: Donal Mcdonald MD;  Location: [facility] CATH LAB;  Service: Vascular;  Laterality: N/A;    APPLICATION OF LARGE EXTERNAL FIXATION DEVICE TO TIBIA Left 8/4/2022    Procedure: APPLICATION, EXTERNAL FIXATION DEVICE, LARGE, TIBIA;  Surgeon: Good Villa MD;  Location: [facility];  Service: Orthopedics;  Laterality: Left;    av bovine graft      Left UE    AV FISTULA PLACEMENT      left UE    BELOW KNEE AMPUTATION OF LOWER EXTREMITY Left 10/6/2022    Procedure: AMPUTATION, BELOW KNEE;  Surgeon: Donal Mcdonald MD;  Location: [facility];  Service: Vascular;  Laterality: Left;    CARDIAC CATHETERIZATION  02/2015    CLOSED REDUCTION OF INJURY OF TIBIA Left 8/4/2022    Procedure: CLOSED REDUCTION, TIBIA;  Surgeon: Good Villa MD;  Location: [facility];  Service: Orthopedics;  Laterality: Left;  Closed reduction left tibial and fibular shaft fractures    CLOSURE OF WOUND Left 9/24/2018    Procedure: CLOSURE, WOUND;  Surgeon: Karla Wheeler DPM;  Location: [facility] OR;  Service: Podiatry;  Laterality: Left;  Secondary Wound closure, extensive    CLOSURE OF WOUND Left 11/5/2018    Procedure: CLOSURE, WOUND;  Surgeon: Karla Wheeler DPM;  Location: [facility];  Service: Podiatry;  Laterality: Left;    DEBRIDEMENT OF MULTIPLE METATARSAL BONES Left 11/5/2018    Procedure: DEBRIDEMENT, METATARSAL BONE, 2 OR MORE BONES;  Surgeon: Karla Wheeler DPM;  Location: [facility];  Service: Podiatry;  Laterality: Left;    EXCISION OF SKIN Left 9/27/2019    Procedure: EXCISION, SKIN;  Surgeon: Lenard Alarcon MD;  Location: [address];  Service: Plastics;  Laterality: Left;  Plastics set, NIMS monitor, ACell    FOOT AMPUTATION THROUGH METATARSAL Left 9/21/2018    Procedure: AMPUTATION, FOOT, TRANSMETATARSAL;  Surgeon: Karla Wheeler  LASHONDA;  Location: [facility] OR;  Service: Podiatry;  Laterality: Left;    FOOT AMPUTATION THROUGH METATARSAL Left 10/31/2018    Procedure: AMPUTATION, FOOT, TRANSMETATARSAL;  Surgeon: Karla Wheeler DPM;  Location: [facility] OR;  Service: Podiatry;  Laterality: Left;    FOOT AMPUTATION THROUGH METATARSAL Left 11/5/2018    Procedure: AMPUTATION, FOOT, TRANSMETATARSAL;  Surgeon: Karla Wheeler DPM;  Location: [facility] OR;  Service: Podiatry;  Laterality: Left;  revisional transmetatarsal amputation, Left foot    IRRIGATION AND DEBRIDEMENT OF LOWER EXTREMITY Left 10/31/2018    Procedure: IRRIGATION AND DEBRIDEMENT, LOWER EXTREMITY;  Surgeon: Karla Wheeler DPM;  Location: [facility] OR;  Service: Podiatry;  Laterality: Left;    KIDNEY TRANSPLANT  05/21/2016    LEFT HEART CATHETERIZATION Left 7/21/2019    Procedure: CATHETERIZATION, HEART, LEFT;  Surgeon: Andrew Valdez MD;  Location: [facility] CATH LAB;  Service: Cardiology;  Laterality: Left;    LEG AMPUTATION THROUGH KNEE  2011    right LE, started as nail puncture leading to diabetic ulcer    REMOVAL OF EXTERNAL FIXATION DEVICE Left 9/17/2022    Procedure: REMOVAL, EXTERNAL FIXATION DEVICE;  Surgeon: Kathleen Zazueta MD;  Location: [facility] OR;  Service: Orthopedics;  Laterality: Left;    SKIN FULL THICKNESS GRAFT Left 10/7/2019    Procedure: APPLICATION, GRAFT, SKIN, FULL-THICKNESS;  Surgeon: Lenard Alarcon MD;  Location: [address];  Service: Plastics;  Laterality: Left;    SURGICAL REMOVAL OF LESION OF FACE Right 10/7/2019    Procedure: EXCISION, LESION, FACE;  Surgeon: Lenard Alarcon MD;  [address];  Service: Plastics;  Laterality: Right;       Review of patient's allergies indicates:  No Known Allergies  Current Facility-Administered Medications   Medication Frequency    acetaminophen tablet 500 mg Q6H PRN    acetaminophen tablet 500 mg Q8H PRN    albuterol-ipratropium 2.5 mg-0.5 mg/3 mL nebulizer solution 3 mL Q6H PRN    atorvastatin tablet 80 mg Daily     ceFEPIme (MAXIPIME) 2 g in dextrose 5 % in water (D5W) 5 % 50 mL IVPB (MB+) Q12H    cholecalciferol (vitamin D3) 125 mcg (5,000 unit) tablet 5,000 Units Every Mon    dextrose 10% bolus 125 mL 125 mL PRN    dextrose 10% bolus 250 mL 250 mL PRN    dextrose 40 % gel 15,000 mg PRN    dextrose 40 % gel 30,000 mg PRN    diphenhydrAMINE capsule 25 mg Q6H PRN    gabapentin capsule 300 mg TID    glucagon (human recombinant) injection 1 mg PRN    HYDROcodone-acetaminophen  mg per tablet 1 tablet Q6H PRN    insulin aspart U-100 pen 0-5 Units QID (AC + HS) PRN    insulin detemir U-100 (Levemir) pen 12 Units BID    levothyroxine tablet 125 mcg Daily    melatonin tablet 6 mg Nightly PRN    metroNIDAZOLE tablet 500 mg Q8H    morphine injection 4 mg Q6H PRN    multivitamin tablet Daily    naloxone 0.4 mg/mL injection 0.02 mg PRN    ondansetron disintegrating tablet 4 mg Q8H PRN    sodium chloride 0.9% flush 10 mL Q12H PRN    tacrolimus capsule 0.5 mg Daily PM    tacrolimus capsule 1 mg Daily AM    vancomycin - pharmacy to dose pharmacy to manage frequency    vancomycin 1,250 mg in dextrose 5 % (D5W) 250 mL IVPB (Vial-Mate) Once     Family History       Problem Relation (Age of Onset)    Cancer Father    Diabetes Father    Heart failure Father, Mother    Stroke Father          Tobacco Use    Smoking status: Former     Packs/day: 1.00     Years: 40.00     Pack years: 40.00     Types: Cigarettes     Quit date: 1/11/2013     Years since quitting: 10.2    Smokeless tobacco: Never   Substance and Sexual Activity    Alcohol use: Yes     Alcohol/week: 6.0 standard drinks     Types: 6 Cans of beer per week     Comment: seldom    Drug use: No    Sexual activity: Never     Review of Systems   Constitutional:  Positive for fatigue.   HENT: Negative.     Respiratory: Negative.     Cardiovascular: Negative.    Gastrointestinal: Negative.    Genitourinary: Negative.    Musculoskeletal: Negative.    Neurological: Negative.    Objective:      Vital Signs (Most Recent):  Temp: 97.6 °F (36.4 °C) (04/18/23 1257)  Pulse: 69 (04/18/23 1330)  Resp: 16 (04/18/23 1257)  BP: (!) 114/56 (04/18/23 1257)  SpO2: 99 % (04/18/23 1257)   Vital Signs (24h Range):  Temp:  [97.5 °F (36.4 °C)-98.5 °F (36.9 °C)] 97.6 °F (36.4 °C)  Pulse:  [69-87] 69  Resp:  [16-18] 16  SpO2:  [95 %-100 %] 99 %  BP: ()/(46-75) 114/56     Weight: 77 kg (169 lb 12.1 oz) (04/17/23 1810)  Body mass index is 23.68 kg/m².  Body surface area is 1.96 meters squared.    I/O last 3 completed shifts:  In: 2300.1 [IV Piggyback:2300.1]  Out: -     Physical Exam  Vitals and nursing note reviewed.   Constitutional:       Appearance: Normal appearance. He is ill-appearing.   Cardiovascular:      Rate and Rhythm: Normal rate and regular rhythm.      Pulses: Normal pulses.      Heart sounds: Normal heart sounds.   Pulmonary:      Effort: Pulmonary effort is normal.      Breath sounds: Normal breath sounds.   Abdominal:      Palpations: Abdomen is soft.      Tenderness: There is no abdominal tenderness.   Musculoskeletal:      Right lower leg: No edema.      Left lower leg: No edema.   Neurological:      Mental Status: He is alert and oriented to person, place, and time.   Psychiatric:         Behavior: Behavior normal.       Significant Labs: reviewed  BMP  Lab Results   Component Value Date     (L) 04/18/2023    K 4.4 04/18/2023     04/18/2023    CO2 17 (L) 04/18/2023    BUN 21 04/18/2023    CREATININE 1.7 (H) 04/18/2023    CALCIUM 8.7 04/18/2023    ANIONGAP 7 (L) 04/18/2023    EGFRNORACEVR 43 (A) 04/18/2023     Lab Results   Component Value Date    WBC 5.82 04/18/2023    HGB 8.6 (L) 04/18/2023    HCT 28.6 (L) 04/18/2023    MCV 82 04/18/2023     04/18/2023     Prograf level 6.2  Vanc level 13.0    Significant Imaging: reviewed

## 2023-04-18 NOTE — CONSULTS
'[facility] Surg  Wound Care    Patient Name:  Lavelle Ladd   MRN:  [id number]  Date: 2023  Diagnosis: Ulcer of right lower extremity with bone involvement without evidence of necrosis    History:     Past Medical History:   Diagnosis Date    DINORAH (acute kidney injury) 2016    Arthritis     CAD in native artery 2019    CHF (congestive heart failure)     Chronic obstructive pulmonary disease 2016    Coronary artery disease involving native coronary artery of native heart without angina pectoris 2016    CRI (chronic renal insufficiency) 2019     donor kidney transplant for DM 16     Induction with Thymo x3 and IV solumedrol to total 875mg  Kidney Biopsy  2016: 16 glomeruli, ACR type 1 AVR type 2, significant microcirculatory changes, c4d negative, No DSA, 5 to10% fibrosis. Treated with thymo x8 2016- no rejection      Diastolic heart failure     Encounter for blood transfusion     ESRD on RRT since 10/2013 10/29/2013    Biopsy proven diabetic nephropathy and lymphoplasmacytic interstitial infiltrate not c/w with AIN (ddx sjogrens or assoc with tamm-horsefall protein extravasation)     GERD (gastroesophageal reflux disease)     History of hepatitis C, s/p successful Rx w/ SVR12 - 2017    Completed 12 weeks harvoni w/ SVR    Hyperlipidemia     Hypertension     PAD (peripheral artery disease) 2019    PIC line (peripherally inserted central catheter) flush     Prophylactic immunotherapy     Proteinuria     PVD (peripheral vascular disease) 2017    RLE BKA CT 16 Extensive atherosclerotic disease of the aorta and mesenteric arteries.     Renal hypertension     Type 2 diabetes mellitus with diabetic neuropathy, with long-term current use of insulin 2016    Vitamin B12 deficiency        Social History     Socioeconomic History    Marital status: Single   Occupational History    Occupation: Disabled     Employer: DISABLED   Tobacco Use     Smoking status: Former     Packs/day: 1.00     Years: 40.00     Pack years: 40.00     Types: Cigarettes     Quit date: 1/11/2013     Years since quitting: 10.2    Smokeless tobacco: Never   Substance and Sexual Activity    Alcohol use: Yes     Alcohol/week: 6.0 standard drinks     Types: 6 Cans of beer per week     Comment: seldom    Drug use: No    Sexual activity: Never   Social History Narrative    Lives alone. Retired from construction and various jobs, now retired. Would like to return to work PT to alleviate boredom.     Social Determinants of Health     Physical Activity: Inactive    Days of Exercise per Week: 0 days    Minutes of Exercise per Session: 0 min       Precautions:     Allergies as of 04/17/2023    (No Known Allergies)       [facility] Assessment Details/Treatment     Consulted on Mr. Ladd due to present on admission skin breakdown to right distal stump and coccyx. Patient admitted from home at request of  nurse due to worsening of right distal stump ulceration now with bone exposure. He is awake and alert. Patient well known to WO team from prior admits. He reported that wound first appeared after wearing his prosthesis.  Gauze dressing removed from right leg. Ulceration to distal stump now measures 5p1p5ib, wound bed is 50% tan none, 50% moist leathery tan slough, small area of red tissue to edge at 10-11 o'clock. Deep area at 3-4 o'clock extends 2cm. Nikole wound erythema noted, small amount serous drainage, no purulence expressed. Nikole wound skin tender. Cleansed with saline, and covered with saline wetted gauze, secured with foam dressing. Recommend vascular surgery consult for potential revision of amputation site, and consult was already placed overnight. For now, recommend daily wet to moist gauze dressings to site. NP at bedside and assessed ulceration.  Patient then turned to left side independently. Foam dressing removed. Full thickness ulceration again noted to coccyx with moist pink  hypergranulation tissue and macerated wound edges. Cleansed with saline and ninoska wound skin painted with cavilon. Sacral foam dressing applied. Recommend change twice weekly and prn.         Right distal stump ulceration with exposed bone:         04/18/23 1045   WOCN Assessment   WOCN Total Time (mins) 30   Visit Date 04/18/23   Visit Time 1045   Consult Type New   WOCN Speciality Wound   Wound pressure;other   Number of Wounds 2   Intervention assessed;applied;chart review;coordination of care;team conference;consult other service;orders   Teaching on-going;complication   Pressure Injury Prevention    Check Moisture Management Pad Done   Sacral Foam Dressing Replace   Check Medical Devices Done        Altered Skin Integrity 04/05/23 2235 Coccyx Full thickness tissue loss. Subcutaneous fat may be visible but bone, tendon or muscle are not exposed   Date First Assessed/Time First Assessed: 04/05/23 2235   Altered Skin Integrity Present on Admission - Did Patient arrive to the hospital with altered skin?: yes  Location: Coccyx  Description of Altered Skin Integrity: Full thickness tissue loss. Sub...   Wound Image    Description of Altered Skin Integrity Full thickness tissue loss. Subcutaneous fat may be visible but bone, tendon or muscle are not exposed   Dressing Appearance Intact   Drainage Amount Scant   Drainage Characteristics/Odor Serous   Appearance Pink;Red;Hypergranulation   Tissue loss description Full thickness   Red (%), Wound Tissue Color 100 %   Periwound Area Macerated   Wound Edges Open   Wound Length (cm) 2 cm   Wound Width (cm) 1.5 cm   Wound Depth (cm) 0.2 cm   Wound Volume (cm^3) 0.6 cm^3   Wound Surface Area (cm^2) 3 cm^2   Care Cleansed with:;Sterile normal saline;Applied:;Skin Barrier   Dressing Silver;Foam;Applied   Dressing Change Due 04/21/23        Altered Skin Integrity 04/06/23 0915 Right distal Leg Ulceration Full thickness tissue loss with exposed bone, tendon, or muscle. Often  includes undermining and tunneling. May extend into muscle and/or supporting structures.   Date First Assessed/Time First Assessed: 04/06/23 0915   Altered Skin Integrity Present on Admission - Did Patient arrive to the hospital with altered skin?: yes  Side: Right  Orientation: distal  Location: (c) Leg  Primary Wound Type: Ulceration  Sravan...   Description of Altered Skin Integrity Full thickness tissue loss with exposed bone, tendon, or muscle. Often includes undermining and tunneling. May extend into muscle and/or supporting structures.   Dressing Appearance Intact;Moist drainage   Drainage Amount Scant   Drainage Characteristics/Odor Serous   Appearance Bone;Tan;Yellow;Slough;Moist   Tissue loss description Full thickness   Red (%), Wound Tissue Color 10 %   Yellow (%), Wound Tissue Color 90 %   Periwound Area Redness   Wound Edges Open   Wound Length (cm) 4 cm   Wound Width (cm) 6 cm   Wound Depth (cm) 1 cm   Wound Volume (cm^3) 24 cm^3   Wound Surface Area (cm^2) 24 cm^2   Care Cleansed with:;Sterile normal saline;Applied:;Skin Barrier   Dressing Gauze, wet to moist;Foam   Dressing Change Due 04/19/23       Recommendations made to primary team for wound care, pressure injury prevention measures.     04/18/2023

## 2023-04-18 NOTE — SUBJECTIVE & OBJECTIVE
Interval History: patient lying in bed resting. Reports tenderness to stump site. Vascular rec- R AKA. Pt will like time to consider recommendation. Per chart review patient completed po vanc for management of c-diff collitis on 4/16. He states stools are now more formed. Labs and vitals reviewed- stable.     Review of Systems   Constitutional:  Negative for chills and fever.   HENT:  Negative for congestion.    Respiratory:  Negative for cough and shortness of breath.    Cardiovascular:  Negative for chest pain.   Gastrointestinal:  Negative for abdominal pain, diarrhea, nausea and vomiting.   Musculoskeletal:  Positive for arthralgias.   Skin:  Positive for wound.   Objective:     Vital Signs (Most Recent):  Temp: 97.9 °F (36.6 °C) (04/18/23 1642)  Pulse: 70 (04/18/23 1642)  Resp: 17 (04/18/23 1642)  BP: (!) 113/50 (04/18/23 1642)  SpO2: 95 % (04/18/23 1642)   Vital Signs (24h Range):  Temp:  [97.5 °F (36.4 °C)-98.5 °F (36.9 °C)] 97.9 °F (36.6 °C)  Pulse:  [69-87] 70  Resp:  [16-18] 17  SpO2:  [95 %-100 %] 95 %  BP: ()/(46-63) 113/50     Weight: 77 kg (169 lb 12.1 oz)  Body mass index is 23.68 kg/m².    Intake/Output Summary (Last 24 hours) at 4/18/2023 1647  Last data filed at 4/18/2023 1644  Gross per 24 hour   Intake 250.05 ml   Output 400 ml   Net -149.95 ml      Physical Exam  Vitals and nursing note reviewed.   Constitutional:       General: He is not in acute distress.     Appearance: He is well-developed.   HENT:      Head: Normocephalic and atraumatic.   Eyes:      Conjunctiva/sclera: Conjunctivae normal.      Pupils: Pupils are equal, round, and reactive to light.   Neck:      Vascular: No JVD.   Cardiovascular:      Rate and Rhythm: Normal rate and regular rhythm.      Heart sounds: Normal heart sounds.   Pulmonary:      Effort: Pulmonary effort is normal. No respiratory distress.      Breath sounds: Normal breath sounds. No wheezing.   Abdominal:      General: Bowel sounds are normal. There is no  distension.      Palpations: Abdomen is soft.      Tenderness: There is no abdominal tenderness. There is no guarding.   Musculoskeletal:         General: Tenderness (R BKA) present. Normal range of motion.      Cervical back: Normal range of motion and neck supple.   Skin:     General: Skin is warm and dry.      Capillary Refill: Capillary refill takes less than 2 seconds.      Findings: Erythema present.   Neurological:      Mental Status: He is alert and oriented to person, place, and time.   Psychiatric:         Behavior: Behavior normal.       Significant Labs: All pertinent labs within the past 24 hours have been reviewed.  CBC:   Recent Labs   Lab 04/17/23  1045 04/17/23  1346 04/18/23  0523   WBC 10.66 9.12 5.82   HGB 10.0* 9.1* 8.6*   HCT 34.2* 29.9* 28.6*    280 288     CMP:   Recent Labs   Lab 04/17/23  1045 04/17/23  1346 04/18/23  0523   * 129* 133*   K 5.1 4.5 4.4    104 109   CO2 16* 16* 17*   * 140* 94   BUN 22 21 21   CREATININE 1.9* 1.9* 1.7*   CALCIUM 8.9 8.9 8.7   PROT  --  7.0  --    ALBUMIN  --  2.5*  --    BILITOT  --  0.2  --    ALKPHOS  --  114  --    AST  --  53*  --    ALT  --  22  --    ANIONGAP 11 9 7*     Urine Studies:   Recent Labs   Lab 04/17/23  1534   COLORU Yellow   APPEARANCEUA Hazy*   PHUR 6.0   SPECGRAV 1.010   PROTEINUA 1+*   GLUCUA Negative   KETONESU Negative   BILIRUBINUA Negative   OCCULTUA 1+*   NITRITE Negative   UROBILINOGEN Negative   LEUKOCYTESUR 3+*   RBCUA 8*   WBCUA >100*   BACTERIA Rare   SQUAMEPITHEL 1   HYALINECASTS 0       Significant Imaging: I have reviewed all pertinent imaging results/findings within the past 24 hours.

## 2023-04-18 NOTE — PLAN OF CARE
PT SEATED EOB WITH S. PT SCOOTED IN BED WITH SBA. PT DECLINED OOB TO CHAIR D/T REPORTS OF INC STABILITY AND FEELING GROGGY FROM PAIN MEDS

## 2023-04-18 NOTE — CONSULTS
[facility] Surg  Vascular Surgery  Consult Note    Inpatient consult to Vascular Surgery  Consult performed by: Jacqueline Jorge NP  Consult ordered by: Jean Blair MD      Subjective:     Chief Complaint/Reason for Admission: R BKA site, non-healing    History of Present Illness: Per H&P: Lavelle Ladd is a 67 y.o. male patient with a PMHx of CAD, CHF, s/p kidney transplant  on 5/21/16, COPD, hyperlipidemia, hypertension, peripheral arterial disease, peripheral vascular disease, Application of large external fixation device to tibia (Left, 8/4/2022); Closed reduction of injury of tibia (Left, 8/4/2022); Removal of external fixation device (Left, 9/17/2022); Angiography of lower extremity (N/A, 9/23/2022); Angiography of lower extremity (N/A, 9/29/2022); and Below knee amputation of lower extremity (Left, 10/6/2022) who presents to the Emergency Department for RLE wound check. Pt reports a wound to his R BKA stump for the past week, but states that it has worsened since this morning.  Was evaluated by home health nurse, and has been recommended to go to ED due to likely findings of exposure of bone on examination.Of note patient has been hospitalized from 04/05 to 4/13- with septic shock/  bacteremia , wound infection ,C,diff and osteo ; initially has been admitted to ICU due to requirement of pressors, has been downgraded to floor and subsequently got discharged on 04/13 on 6 weeks of antibiotics including IV cefepime and p.o. vanc with end of treatment 05/25/2023.      [facility] asked to evaluate the patient's R BKA site. Site was recently swabbed and dressed.  Pics in media reviewed.    Medications Prior to Admission   Medication Sig Dispense Refill Last Dose    acetaminophen (TYLENOL) 500 MG tablet Take 2 tablets (1,000 mg total) by mouth 3 (three) times daily.  0     amLODIPine (NORVASC) 10 MG tablet Take 10 mg by mouth once daily.       ascorbic acid, vitamin C,  (VITAMIN C) 500 MG tablet Take 1 tablet by mouth every morning.       aspirin (ECOTRIN) 81 MG EC tablet Take 1 tablet (81 mg total) by mouth once daily. 90 tablet 1     atorvastatin (LIPITOR) 80 MG tablet Take 80 mg by mouth once daily.       carvediloL (COREG) 3.125 MG tablet Take 3.125 mg by mouth 2 (two) times a day.       cholecalciferol, vitamin D3, 125 mcg (5,000 unit) Tab Take 5,000 Units by mouth every Monday.       dextrose 5 % in water (D5W) 5 % PgBk 50 mL with ceFEPIme 2 gram SolR 2 g Inject 2 g into the vein every 12 (twelve) hours.       gabapentin (NEURONTIN) 300 MG capsule Take 300 mg by mouth 3 (three) times daily.       HUMULIN R REGULAR U-100 INSULN 100 unit/mL injection Inject  into the skin 2 (two) times daily before meals. If blood sugar 200-250= 4 units, 251-300= 6 units, 301-350= 8 units, 351-400= 10 units, 401- 450= 12 units, >450= 14 units and call MD.       insulin detemir U-100, Levemir, 100 unit/mL (3 mL) SubQ InPn pen Inject 12 Units into the skin 2 (two) times daily. 6 mL 11     levothyroxine (SYNTHROID) 125 MCG tablet Take 125 mcg by mouth once daily.       meloxicam (MOBIC) 7.5 MG tablet Take 7.5 mg by mouth once daily.       metroNIDAZOLE (FLAGYL) 500 MG tablet Take 1 tablet (500 mg total) by mouth every 8 (eight) hours. 90 tablet 1     multivitamin Tab Take 1 tablet by mouth once daily.       mycophenolate (CELLCEPT) 250 mg Cap Take 250 mg by mouth 2 (two) times daily.       nitroGLYCERIN (NITROSTAT) 0.4 MG SL tablet Place 1 tablet (0.4 mg total) under the tongue every 5 (five) minutes as needed. 30 tablet 0     ondansetron (ZOFRAN) 4 MG tablet Take 4 mg by mouth every 8 (eight) hours as needed for Nausea.       semaglutide (OZEMPIC) 1 mg/dose (2 mg/1.5 mL) PnIj Inject 1 mg under the skin every 7 days. 6 Syringe 3     sertraline (ZOLOFT) 25 MG tablet Take 25 mg by mouth once daily.       tacrolimus (PROGRAF) 0.5 MG Cap Take 2 capsules (1 mg total) by mouth every 12 (twelve) hours.  180 capsule 11     traMADoL (ULTRAM) 50 mg tablet Take 1 tablet (50 mg total) by mouth every 8 (eight) hours as needed for Pain. 15 tablet 0        Review of patient's allergies indicates:  No Known Allergies    Past Medical History:   Diagnosis Date    DINORAH (acute kidney injury) 2016    Arthritis     CAD in native artery 2019    CHF (congestive heart failure)     Chronic obstructive pulmonary disease 2016    Coronary artery disease involving native coronary artery of native heart without angina pectoris 2016    CRI (chronic renal insufficiency) 2019     donor kidney transplant for DM 16     Induction with Thymo x3 and IV solumedrol to total 875mg  Kidney Biopsy  2016: 16 glomeruli, ACR type 1 AVR type 2, significant microcirculatory changes, c4d negative, No DSA, 5 to10% fibrosis. Treated with thymo x8 2016- no rejection      Diastolic heart failure     Encounter for blood transfusion     ESRD on RRT since 10/2013 10/29/2013    Biopsy proven diabetic nephropathy and lymphoplasmacytic interstitial infiltrate not c/w with AIN (ddx sjogrens or assoc with tamm-horsefall protein extravasation)     GERD (gastroesophageal reflux disease)     History of hepatitis C, s/p successful Rx w/ SVR12 - 2017    Completed 12 weeks harvoni w/ SVR    Hyperlipidemia     Hypertension     PAD (peripheral artery disease) 2019    PIC line (peripherally inserted central catheter) flush     Prophylactic immunotherapy     Proteinuria     PVD (peripheral vascular disease) 2017    RLE BKA CT 16 Extensive atherosclerotic disease of the aorta and mesenteric arteries.     Renal hypertension     Type 2 diabetes mellitus with diabetic neuropathy, with long-term current use of insulin 2016    Vitamin B12 deficiency      Past Surgical History:   Procedure Laterality Date    ANGIOGRAPHY OF LOWER EXTREMITY N/A 2022    Procedure: ANGIOGRAM, LOWER EXTREMITY/left leg;   Surgeon: Donal Mcdonald MD;  Location: [facility] CATH LAB;  Service: Cardiovascular;  Laterality: N/A;    ANGIOGRAPHY OF LOWER EXTREMITY N/A 9/29/2022    Procedure: ANGIOGRAM, LOWER EXTREMITY/left leg;  Surgeon: Donal Mcdonald MD;  Location: [facility] CATH LAB;  Service: Peripheral Vascular;  Laterality: N/A;  resched from 9/23 pt ready now    AORTOGRAPHY WITH SERIALOGRAPHY N/A 6/14/2018    Procedure: LEFT LEG ANGIOGRAM;  Surgeon: Donal Mcdonald MD;  Location: [facility] CATH LAB;  Service: Vascular;  Laterality: N/A;    APPLICATION OF LARGE EXTERNAL FIXATION DEVICE TO TIBIA Left 8/4/2022    Procedure: APPLICATION, EXTERNAL FIXATION DEVICE, LARGE, TIBIA;  Surgeon: Good Villa MD;  Location: [facility];  Service: Orthopedics;  Laterality: Left;    av bovine graft      Left UE    AV FISTULA PLACEMENT      left UE    BELOW KNEE AMPUTATION OF LOWER EXTREMITY Left 10/6/2022    Procedure: AMPUTATION, BELOW KNEE;  Surgeon: Donal Mcdonald MD;  Location: [facility];  Service: Vascular;  Laterality: Left;    CARDIAC CATHETERIZATION  02/2015    CLOSED REDUCTION OF INJURY OF TIBIA Left 8/4/2022    Procedure: CLOSED REDUCTION, TIBIA;  Surgeon: Good Villa MD;  Location: [facility];  Service: Orthopedics;  Laterality: Left;  Closed reduction left tibial and fibular shaft fractures    CLOSURE OF WOUND Left 9/24/2018    Procedure: CLOSURE, WOUND;  Surgeon: Karla Wheeler DPM;  Location: [facility];  Service: Podiatry;  Laterality: Left;  Secondary Wound closure, extensive    CLOSURE OF WOUND Left 11/5/2018    Procedure: CLOSURE, WOUND;  Surgeon: Karla Wheeler DPM;  Location: [facility];  Service: Podiatry;  Laterality: Left;    DEBRIDEMENT OF MULTIPLE METATARSAL BONES Left 11/5/2018    Procedure: DEBRIDEMENT, METATARSAL BONE, 2 OR MORE BONES;  Surgeon: Karla Wheeler DPM;  Location: [facility];  Service: Podiatry;  Laterality: Left;    EXCISION OF SKIN Left 9/27/2019    Procedure: EXCISION, SKIN;  Surgeon: Lenard Alarcon MD;  Location: [facility]  2ND FLR;  Service: Plastics;  Laterality: Left;  Plastics set, NIMS monitor, ACell    FOOT AMPUTATION THROUGH METATARSAL Left 9/21/2018    Procedure: AMPUTATION, FOOT, TRANSMETATARSAL;  Surgeon: Karla Wheeler DPM;  Location: [facility] OR;  Service: Podiatry;  Laterality: Left;    FOOT AMPUTATION THROUGH METATARSAL Left 10/31/2018    Procedure: AMPUTATION, FOOT, TRANSMETATARSAL;  Surgeon: Karla Wheeler DPM;  Location: [facility] OR;  Service: Podiatry;  Laterality: Left;    FOOT AMPUTATION THROUGH METATARSAL Left 11/5/2018    Procedure: AMPUTATION, FOOT, TRANSMETATARSAL;  Surgeon: Karla Wheeler DPM;  Location: [facility] OR;  Service: Podiatry;  Laterality: Left;  revisional transmetatarsal amputation, Left foot    IRRIGATION AND DEBRIDEMENT OF LOWER EXTREMITY Left 10/31/2018    Procedure: IRRIGATION AND DEBRIDEMENT, LOWER EXTREMITY;  Surgeon: Karla Wheeler DPM;  Location: [facility] OR;  Service: Podiatry;  Laterality: Left;    KIDNEY TRANSPLANT  05/21/2016    LEFT HEART CATHETERIZATION Left 7/21/2019    Procedure: CATHETERIZATION, HEART, LEFT;  Surgeon: Andrew Valdez MD;  Location: [facility] CATH LAB;  Service: Cardiology;  Laterality: Left;    LEG AMPUTATION THROUGH KNEE  2011    right LE, started as nail puncture leading to diabetic ulcer    REMOVAL OF EXTERNAL FIXATION DEVICE Left 9/17/2022    Procedure: REMOVAL, EXTERNAL FIXATION DEVICE;  Surgeon: Kathleen Zazueta MD;  Location: [facility] OR;  Service: Orthopedics;  Laterality: Left;    SKIN FULL THICKNESS GRAFT Left 10/7/2019    Procedure: APPLICATION, GRAFT, SKIN, FULL-THICKNESS;  Surgeon: Lenard Alarcon MD;  Location: [facility] OR 2ND FLR;  Service: Plastics;  Laterality: Left;    SURGICAL REMOVAL OF LESION OF FACE Right 10/7/2019    Procedure: EXCISION, LESION, FACE;  Surgeon: Lenard Alarcon MD;  Location: [facility] OR 2ND FLR;  Service: Plastics;  Laterality: Right;     Family History       Problem Relation (Age of Onset)    Cancer Father    Diabetes Father    Heart  failure Father, Mother    Stroke Father          Tobacco Use    Smoking status: Former     Packs/day: 1.00     Years: 40.00     Pack years: 40.00     Types: Cigarettes     Quit date: 1/11/2013     Years since quitting: 10.2    Smokeless tobacco: Never   Substance and Sexual Activity    Alcohol use: Yes     Alcohol/week: 6.0 standard drinks     Types: 6 Cans of beer per week     Comment: seldom    Drug use: No    Sexual activity: Never     Review of Systems   Constitutional:  Negative for chills and fever.   Respiratory:  Negative for chest tightness.    Cardiovascular:  Negative for chest pain.   Objective:     Vital Signs (Most Recent):  Temp: 97.6 °F (36.4 °C) (04/18/23 1257)  Pulse: 72 (04/18/23 1257)  Resp: 16 (04/18/23 1257)  BP: (!) 114/56 (04/18/23 1257)  SpO2: 99 % (04/18/23 1257)   Vital Signs (24h Range):  Temp:  [97.5 °F (36.4 °C)-98.5 °F (36.9 °C)] 97.6 °F (36.4 °C)  Pulse:  [70-87] 72  Resp:  [16-18] 16  SpO2:  [95 %-100 %] 99 %  BP: ()/(46-75) 114/56     Weight: 77 kg (169 lb 12.1 oz)  Body mass index is 23.68 kg/m².    Date 04/18/23 0700 - 04/19/23 0659   Shift 4451-8797 0187-0237 3750-7714 24 Hour Total   INTAKE   Shift Total(mL/kg)       OUTPUT   Urine(mL/kg/hr) 200   200   Shift Total(mL/kg) 200(2.6)   200(2.6)   Weight (kg) 77 77 77 77       Physical Exam  Vitals reviewed.   Constitutional:       Appearance: He is normal weight.   HENT:      Head: Normocephalic.   Cardiovascular:      Rate and Rhythm: Normal rate.   Pulmonary:      Effort: Pulmonary effort is normal.   Musculoskeletal:      Cervical back: Normal range of motion.   Skin:     General: Skin is warm.      Comments: Dressed R BKA site   Neurological:      Mental Status: He is alert and oriented to person, place, and time.   Psychiatric:         Mood and Affect: Mood normal.       Significant Labs:  CBC:   Recent Labs   Lab 04/18/23  0523   WBC 5.82   RBC 3.49*   HGB 8.6*   HCT 28.6*      MCV 82   MCH 24.6*   MCHC 30.1*      CMP:   Recent Labs   Lab 04/17/23  1346 04/18/23  0523   * 94   CALCIUM 8.9 8.7   ALBUMIN 2.5*  --    PROT 7.0  --    * 133*   K 4.5 4.4   CO2 16* 17*    109   BUN 21 21   CREATININE 1.9* 1.7*   ALKPHOS 114  --    ALT 22  --    AST 53*  --    BILITOT 0.2  --        Significant Diagnostics:      Assessment/Plan:     Active Diagnoses:    Diagnosis Date Noted POA    PRINCIPAL PROBLEM:  Ulcer of right lower extremity with bone involvement without evidence of necrosis [L97.916] 04/17/2023 Unknown    Hyponatremia [E87.1] 04/17/2023 Unknown    Metabolic acidosis [E87.20] 04/17/2023 Unknown    Renal transplant, status post [Z94.0] 11/30/2016 Not Applicable    Chronic obstructive pulmonary disease [J44.9] 09/12/2016 Yes    Hyperlipidemia [E78.5] 07/01/2016 Yes    Type 2 diabetes mellitus with hyperglycemia, with long-term current use of insulin [E11.65, Z79.4]  Not Applicable    Essential hypertension [I10] 02/20/2015 Yes    GERD (gastroesophageal reflux disease) [K21.9] 10/12/2013 Yes      Problems Resolved During this Admission:     R BKA, non-healing  Patient seen and examined with rounding vascular surgeon  Erythema to edges of wound and bone exposed  Recommended right above knee amputation   Patient would like more time to think about surgery.  We will continue to follow  Thank you for your consult. I will follow-up with patient. Please contact us if you have any additional questions.    Jacqueline Jorge NP  Vascular Surgery  [facility] - Med Surg

## 2023-04-18 NOTE — CONSULTS
Pharmacokinetic Assessment Follow Up: IV Vancomycin    Vancomycin serum concentration assessment(s):    The random level was drawn correctly and can be used to guide therapy at this time. The measurement is within the desired definitive target of less than 20 mcg/mL.    Vancomycin Regimen Plan:    Give Vancomycin 15 mg/kg = 1250 mg IV x 1 dose.     Re-dose when the random level is less than 20 mcg/mL, next level to be drawn at 1500 on 4/19/23.    Drug levels (last 3 results):  Recent Labs   Lab Result Units 04/18/23  1310   Vancomycin, Random ug/mL 13.0     Pharmacy will continue to follow and monitor vancomycin.    Please contact pharmacy at extension [phone] for questions regarding this assessment.    Thank you for the consult,   Angie Phipps PharmD       Patient brief summary:  Lavelle Ladd is a 67 y.o. male initiated on antimicrobial therapy with IV Vancomycin for treatment of bone/joint infection    The patient's current regimen is pulse dosing (dosing by level) when random level is less than 20 mcg/mL.    Drug Allergies:   Review of patient's allergies indicates:  No Known Allergies    Actual Body Weight:   77 kg    Renal Function:   Estimated Creatinine Clearance: 43.7 mL/min (A) (based on SCr of 1.7 mg/dL (H)).,     Dialysis Method (if applicable):  N/A    CBC (last 72 hours):  Recent Labs   Lab Result Units 04/17/23  1045 04/17/23  1346 04/18/23  0523   WBC K/uL 10.66 9.12 5.82   Hemoglobin g/dL 10.0* 9.1* 8.6*   Hematocrit % 34.2* 29.9* 28.6*   Platelets K/uL 320 280 288   Gran % % 66.6 65.1 52.8   Lymph % % 20.4 21.9 29.4   Mono % % 9.8 9.9 11.9   Eosinophil % % 2.2 2.1 4.5   Basophil % % 0.4 0.3 0.9   Differential Method  Automated Automated Automated       Metabolic Panel (last 72 hours):  Recent Labs   Lab Result Units 04/17/23  1045 04/17/23  1346 04/17/23  1534 04/18/23  0523   Sodium mmol/L 130* 129*  --  133*   Sodium, Urine mmol/L  --   --  65  --    Potassium mmol/L 5.1 4.5  --  4.4    Chloride mmol/L 103 104  --  109   CO2 mmol/L 16* 16*  --  17*   Glucose mg/dL 120* 140*  --  94   Glucose, UA   --   --  Negative  --    BUN mg/dL 22 21  --  21   Creatinine mg/dL 1.9* 1.9*  --  1.7*   Creatinine, Urine mg/dL  --   --  45.0  --    Albumin g/dL  --  2.5*  --   --    Total Bilirubin mg/dL  --  0.2  --   --    Alkaline Phosphatase U/L  --  114  --   --    AST U/L  --  53*  --   --    ALT U/L  --  22  --   --        Vancomycin Administrations:  vancomycin given in the last 96 hours                     vancomycin 1,250 mg in dextrose 5 % (D5W) 250 mL IVPB (Vial-Mate) (mg) 1,250 mg New Bag 04/18/23 1515    vancomycin 1,500 mg in dextrose 5 % (D5W) 250 mL IVPB (Vial-Mate) (mg) 1,500 mg New Bag 04/17/23 1809                    Microbiologic Results:  Microbiology Results (last 7 days)       Procedure Component Value Units Date/Time    Aerobic culture [[id number]] Collected: 04/18/23 1310    Order Status: Sent Specimen: Abscess from Knee, Right Updated: 04/18/23 1333    AFB Culture & Smear [[id number]] Collected: 04/18/23 1310    Order Status: Sent Specimen: Abscess from Knee, Right Updated: 04/18/23 1333    Culture, Anaerobe [[id number]] Collected: 04/18/23 1310    Order Status: Sent Specimen: Abscess from Knee, Right Updated: 04/18/23 1332    Blood culture x two cultures. Draw prior to antibiotics. [[id number]] Collected: 04/17/23 1347    Order Status: Completed Specimen: Blood from Other (Specify) Updated: 04/18/23 0715     Blood Culture, Routine No Growth to date    Narrative:      Aerobic and anaerobic    Blood culture x two cultures. Draw prior to antibiotics. [[id number]] Collected: 04/17/23 1347    Order Status: Completed Specimen: Blood from Other (Specify) Updated: 04/18/23 0715     Blood Culture, Routine No Growth to date    Narrative:      Aerobic and anaerobic    Urine culture [[id number]] Collected: 04/17/23 1534    Order Status: No result Specimen: Urine Updated: 04/17/23 1540          Thank you  for allowing us to participate in this patient's care.     Angie Phipps PharmD 04/18/2023 5:12 PM

## 2023-04-18 NOTE — HOSPITAL COURSE
Lavelle Ladd is a 66 yo male, who was admitted (4/5/23-4/13/23) with septic shock/  bacteremia , wound infection ,C diff colitis and osteomyelitis of R BKA stump s/p 6 weeks antimicrobial therapy. He presented to ED with worsening of R BKA wound with bone exposure and necrosis. Vanc, flagyl and cefepime initiated. Vascular surgery consulted with recommendation for right above the knee amputation. Patient states he would like 2nd opinion.   4/19/23 1 of 2 blood cultures collected on admit positive for yeast. Per ID start Micafungin. Repeat blood cultures in am. Wound cultures pending. Start bicarb for management of metabolic acidosis per nephrology recommendation.   4/20 wound culture positive for pseudomonas. Continue antibiotic recs per ID. Patient requesting to speak with vascular service regarding amputation- as he has more questions before making final decision Dr. Mcdonald notified.   4/21 patient agrees to R AKA next week. Vascular notified.

## 2023-04-18 NOTE — ASSESSMENT & PLAN NOTE
Patient's FSGs are on current medication regimen.  Last A1c reviewed-   Lab Results   Component Value Date    HGBA1C 6.7 (H) 04/05/2023     Most recent fingerstick glucose reviewed-   Recent Labs   Lab 04/17/23 2056 04/18/23  0429 04/18/23  1145   POCTGLUCOSE 188* 118* 106     Current correctional scale   anti-hyperglycemic dose as follows-   Antihyperglycemics (From admission, onward)    Start     Stop Route Frequency Ordered    04/18/23 2100  insulin detemir U-100 (Levemir) pen 12 Units         -- SubQ 2 times daily 04/17/23 1643    04/17/23 1749  insulin aspart U-100 pen 0-5 Units         -- SubQ Before meals & nightly PRN 04/17/23 1649        Hold Oral hypoglycemics while patient is in the hospital.    Glucose POC, sliding scale insulin, hypoglycemia protocol   At home- on Levemir 12 units b.i.d., sliding scale insulin   Ordered 12 units tonight, hold morning dose for NPO status for any anticipated vascular interventions, ;

## 2023-04-18 NOTE — PLAN OF CARE
OT sanjay completed. Recommends SNF vs HHOT depending on progress.  SPV for bed mobility and scooting. Declining sit>stand at this time d/t recently taking medication.

## 2023-04-18 NOTE — PLAN OF CARE
Bed alarm set, fall precautions maintained. PRN morphine and hydrocodone ordered for pain. Call bell and personal items within reach. VSS. Pt NPO except sips with medication pending vascular consult. Chart check complete.     Problem: Adult Inpatient Plan of Care  Goal: Plan of Care Review  Outcome: Ongoing, Progressing     Problem: Adult Inpatient Plan of Care  Goal: Absence of Hospital-Acquired Illness or Injury  Outcome: Ongoing, Progressing     Problem: Adult Inpatient Plan of Care  Goal: Optimal Comfort and Wellbeing  Outcome: Ongoing, Progressing

## 2023-04-18 NOTE — PLAN OF CARE
Discussed poc with pt, pt verbalized understanding    Purposeful rounding every 2hours    VS wnl  Cardiac monitoring in use, pt is NSR, tele monitor # [id number]  Blood glucose monitoring   Fall precautions in place, remains injury free    Pain under control with PRN meds  Wound care complete.    IVFs  Accurate I&Os  Abx given as prescribed  Bed locked at lowest position  Call light within reach    Chart check complete  Will cont with POC
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
## 2023-04-18 NOTE — ASSESSMENT & PLAN NOTE
History of renal transplant in 2016   Creatinine 1.9, (creatinine 1.9 at the time of discharge as of 4/13)  Avoid nephrotoxins,  Renal dose of medications   CellCept held due to underlying infection, ordered Prograf 1 mg q.a.m., 0.5 mg q.h.s. as recommended by Nephrology during most recent admission   Prograf level in a.m.   Nephrology consult    4/18  Cr 1.7

## 2023-04-19 PROBLEM — B49 FUNGEMIA: Status: ACTIVE | Noted: 2023-01-01

## 2023-04-19 NOTE — PROGRESS NOTES
[facility]  Progress Note    Patient Name: Lavelle Ladd  MRN: [id number]  Patient Class: OP- Observation   Admission Date: 4/17/2023  Length of Stay: 0 days  Attending Physician: Steve Hanna MD  Primary Care Provider: Primary Doctor No        Subjective:     Principal Problem:Ulcer of right lower extremity with bone involvement without evidence of necrosis        HPI:  Lavelle Ladd is a 67 y.o. male patient with a PMHx of CAD, CHF, s/p kidney transplant  on 5/21/16, COPD, hyperlipidemia, hypertension, peripheral arterial disease, peripheral vascular disease, Application of large external fixation device to tibia (Left, 8/4/2022); Closed reduction of injury of tibia (Left, 8/4/2022); Removal of external fixation device (Left, 9/17/2022); Angiography of lower extremity (N/A, 9/23/2022); Angiography of lower extremity (N/A, 9/29/2022); and Below knee amputation of lower extremity (Left, 10/6/2022) who presents to the Emergency Department for RLE wound check. Pt reports a wound to his R BKA stump for the past week, but states that it has worsened since this morning.  Was evaluated by home health nurse, and has been recommended to go to ED due to likely findings of exposure of bone on examination.  Stated that Symptoms are constant and moderate in severity. No mitigating or exacerbating factors reported. Associated sxs include RLE pain and intermittent nausea. Pt also reports slurred speech since taking Tramadol for the first time this morning. Patient denies any fever, chills, vomiting, SOB, CP, weakness, numbness, dizziness, headache, and all other sxs at this time.   Stated improvement in diarrhea, stated having 2-3 episodes of formed stools, stated being compliant with medications/antibiotics at home.        Of note patient has been hospitalized from 04/05 to 4/13- with septic shock/  bacteremia , wound infection ,C,diff and osteo ; initially has been admitted to ICU due to  requirement of pressors, has been downgraded to floor and subsequently got discharged on 04/13 on 6 weeks of antibiotics including IV cefepime and p.o. vanc with end of treatment 05/25/2023.    Also patient has been started on p.o. vanc for findings of positive C diff on 04/06/2023 and has been recommended to take for total 10 days of duration with end of treatment 04/16/2023. ID has been consulted;   Status post PICC line placement on 04/11/2023.  Also during recent hospitalization for underlying renal transplant with DINORAH findings with creatinine up to 2.2 on admission, nephrology has been consulted, CellCept has been held due to underlying active infection, nephrology recommended Prograf 1 mg in a.m., 0.5 mg q.h.s. to maintain levels between 4-6.    On arrival to ED, patient remained afebrile, no tachycardia, no tachypnea, blood pressure soft, lactic acid 0.6, received 2 L of IV fluids, blood cultures x2 ordered and antibiotics were started within 4 hours duration.  Vascular surgery consulted;   For the findings of slurred speech on arrival, patient underwent CT head while in ED, showed no acute intracranial findings.      Pt has been   admitted multiple times since 8 /4/22  in multiples facility including  [facility], [facility] , [facility]  and  [facility] .      Lives alone at home, stated that he gets support from his sister, ;  Code status:  Full code (alert and oriented x3)                      Overview/Hospital Course:  Lavelle Ladd is a 68 yo male, who was recently admitted with septic shock/  bacteremia , wound infection ,C diff colitis and osteomyelitis of R BKA stump s/p 6 weeks antimicrobial therapy. He presented to ED with worsening of R BKA wound with bone exposure and necrosis. Vanc, flagyl and cefepime initiated. Vascular surgery consulted with recommendation for right above the knee amputation. Patient states he would like 2nd opinion.   4/19/23 1 of 2 blood cultures collected on admit positive  for yeast. Per ID start Micafungin. Repeat blood cultures in am. Wound cultures pending. Start bicarb for management of metabolic acidosis per nephrology recommendation.       Interval History: patient lying in bed resting. Reports he would like to continue conservative management. Awaiting vascular. Micafungin initiated for fungemia.     Review of Systems   Constitutional:  Negative for chills and fever.   HENT:  Negative for congestion.    Respiratory:  Negative for cough and shortness of breath.    Cardiovascular:  Negative for chest pain.   Gastrointestinal:  Negative for abdominal pain, diarrhea, nausea and vomiting.   Musculoskeletal:  Positive for arthralgias.   Skin:  Positive for wound.   Objective:     Vital Signs (Most Recent):  Temp: 97.5 °F (36.4 °C) (04/19/23 1616)  Pulse: 80 (04/19/23 1616)  Resp: 17 (04/19/23 1638)  BP: (!) 123/53 (04/19/23 1616)  SpO2: 98 % (04/19/23 1616)   Vital Signs (24h Range):  Temp:  [97.2 °F (36.2 °C)-98.1 °F (36.7 °C)] 97.5 °F (36.4 °C)  Pulse:  [68-82] 80  Resp:  [14-18] 17  SpO2:  [93 %-98 %] 98 %  BP: (107-123)/(47-55) 123/53     Weight: 81.2 kg (179 lb 0.2 oz)  Body mass index is 24.97 kg/m².    Intake/Output Summary (Last 24 hours) at 4/19/2023 1806  Last data filed at 4/19/2023 1739  Gross per 24 hour   Intake 400 ml   Output 1500 ml   Net -1100 ml        Physical Exam  Vitals and nursing note reviewed.   Constitutional:       General: He is not in acute distress.     Appearance: He is well-developed.   HENT:      Head: Normocephalic and atraumatic.   Eyes:      Conjunctiva/sclera: Conjunctivae normal.      Pupils: Pupils are equal, round, and reactive to light.   Neck:      Vascular: No JVD.   Cardiovascular:      Rate and Rhythm: Normal rate and regular rhythm.      Heart sounds: Normal heart sounds.   Pulmonary:      Effort: Pulmonary effort is normal. No respiratory distress.      Breath sounds: Normal breath sounds. No wheezing.   Abdominal:      General: Bowel  sounds are normal. There is no distension.      Palpations: Abdomen is soft.      Tenderness: There is no abdominal tenderness. There is no guarding.   Musculoskeletal:         General: Tenderness (R BKA) present. Normal range of motion.      Cervical back: Normal range of motion and neck supple.   Skin:     General: Skin is warm and dry.      Capillary Refill: Capillary refill takes less than 2 seconds.      Findings: Erythema present.   Neurological:      Mental Status: He is alert and oriented to person, place, and time.   Psychiatric:         Behavior: Behavior normal.       Significant Labs: All pertinent labs within the past 24 hours have been reviewed.  CBC:   Recent Labs   Lab 04/18/23 0523 04/19/23 0417   WBC 5.82 2.12*   HGB 8.6* 13.4*   HCT 28.6* 45.6    134*       CMP:   Recent Labs   Lab 04/18/23 0523 04/19/23 0417   * 135*   K 4.4 3.9    113*   CO2 17* 15*   GLU 94 168*   BUN 21 20   CREATININE 1.7* 1.7*   CALCIUM 8.7 7.1*   ANIONGAP 7* 7*             Significant Imaging: I have reviewed all pertinent imaging results/findings within the past 24 hours.      Assessment/Plan:      * Ulcer of right lower extremity with bone involvement without evidence of necrosis  Presented with right stump pain (8/10) and discharge; (gradual worsening of lesion with findings of exposed bone)  MRI as of 4/9- Findings concerning for early osteomyelitis of the stump of the tibia about the wound margin.  Xray tibia/ fibula right- Below the knee amputation changes.  No acute fracture or suspicious osseous lesion.  Anatomical joint alignment.  No definite subcutaneous gas.  Soft tissue wound extending to the osseous margin of the distal tibia.  No definite associated erosive ostia changes.  S/p Application of large external fixation device to tibia (Left, 8/4/2022); Closed reduction of injury of tibia (Left, 8/4/2022); Removal of external fixation device (Left, 9/17/2022); Angiography of lower extremity  (N/A, 9/23/2022); Angiography of lower extremity (N/A, 9/29/2022); and Below knee amputation of lower extremity (Left, 10/6/2022 ;    Recently discharged on 04/13 on IV cefepime, p.o. Flagyl for 6 weeks' course, with end of treatment 5/25; status post PICC line placed on 04/11 4/18  - Vascular following- rec R AKA  - continue antibiotics for now     Afebrile, no leukocytosis on arrival, no tachycardia, no tachypnea, blood pressure is soft, lactic acid 0.6; received 2 L of IV fluids, antibiotics, blood cultures, vascular consulted; wound care follow up  To continue cefepime, Flagyl, follow up with vascular, NPO except for medications since midnight for any anticipated vascular intervention     4/19  Vascular recommends right AKA, patient requesting 2nd opinion   -continue vanc, cefepime and flagyl   -follow wound cultures                   Fungemia  -start Micafungin per ID recs       Metabolic acidosis  Bicarb of 16, anion gap 9, likely secondary to underlying kidney disease   Monitor and consider replacement as needed   Follow up with Nephrology    4/19  -resume po bicarb       Hyponatremia  Sodium 129  Baseline sodium runs between 129-133 likely secondary to underlying findings of cirrhosis as of ultrasound abdomen from 09/2022  Follow up on serum osmolality, urine osmolality, lytes, TSH, nephrology follow up     4/18  Na improved, 133   Nephrology following         Renal transplant, status post  History of renal transplant in 2016   Creatinine 1.9, (creatinine 1.9 at the time of discharge as of 4/13)  Avoid nephrotoxins,  Renal dose of medications   CellCept held due to underlying infection, ordered Prograf 1 mg q.a.m., 0.5 mg q.h.s. as recommended by Nephrology during most recent admission   Prograf level in a.m.   Nephrology consult    4/18  Cr 1.7         Chronic obstructive pulmonary disease  Currently not in acute exacerbation   Nebs p.r.n.      Hyperlipidemia   Resume home dose      Type 2 diabetes  mellitus with hyperglycemia, with long-term current use of insulin  Patient's FSGs are on current medication regimen.  Last A1c reviewed-   Lab Results   Component Value Date    HGBA1C 6.7 (H) 04/05/2023     Most recent fingerstick glucose reviewed-   Recent Labs   Lab 04/17/23 2056 04/18/23  0429 04/18/23  1145   POCTGLUCOSE 188* 118* 106     Current correctional scale   anti-hyperglycemic dose as follows-   Antihyperglycemics (From admission, onward)    Start     Stop Route Frequency Ordered    04/18/23 2100  insulin detemir U-100 (Levemir) pen 12 Units         -- SubQ 2 times daily 04/17/23 1643    04/17/23 1749  insulin aspart U-100 pen 0-5 Units         -- SubQ Before meals & nightly PRN 04/17/23 1649        Hold Oral hypoglycemics while patient is in the hospital.    Glucose POC, sliding scale insulin, hypoglycemia protocol   At home- on Levemir 12 units b.i.d., sliding scale insulin   Ordered 12 units tonight, hold morning dose for NPO status for any anticipated vascular interventions, ;     Essential hypertension  Blood pressure 90s over 50s at the time of admission, hold home medications including amlodipine, Coreg   Monitor and resume medications accordingly        VTE Risk Mitigation (From admission, onward)         Ordered     IP VTE HIGH RISK PATIENT  Once         04/17/23 1649     Place sequential compression device  Until discontinued         04/17/23 1649                Discharge Planning   LISETH:  4/22/23    Code Status: Full Code   Is the patient medically ready for discharge?:     Reason for patient still in hospital (select all that apply): Patient trending condition, Treatment, Consult recommendations and Pending disposition                     Sherita Christensen NP  Department of Hospital Medicine   [facility] - Med Surg

## 2023-04-19 NOTE — HPI
67  year old man with PMHx of CAD, CHF, s/p kidney transplant  on 5/21/16, COPD, hyperlipidemia, hypertension, peripheral arterial disease, peripheral vascular disease, below knee amputation of lower extremity admitted with worsening right lower extremity wound.  He was recently seen earlier this wound and was discharged to complete 6 weeks of Cefepime/flagyl.  Labs and imaging test reviewed-    Specimen: Wound from Leg, Right Updated: 04/10/23 1038      Aerobic Bacterial Culture PSEUDOMONAS AERUGINOSA   Moderate    Abnormal      PROVIDENCIA STUARTII   Moderate   Skin skylar also present    Blood culture- 04/05-strep/proteus   Component      Latest Ref Rng & Units 4/18/2023 4/17/2023 4/17/2023            1:46 PM 10:45 AM   WBC      3.90 - 12.70 K/uL 5.82 9.12 10.66

## 2023-04-19 NOTE — SUBJECTIVE & OBJECTIVE
Interval History: patient lying in bed resting. Reports he would like to continue conservative management. Awaiting vascular. Micafungin initiated for fungemia.     Review of Systems   Constitutional:  Negative for chills and fever.   HENT:  Negative for congestion.    Respiratory:  Negative for cough and shortness of breath.    Cardiovascular:  Negative for chest pain.   Gastrointestinal:  Negative for abdominal pain, diarrhea, nausea and vomiting.   Musculoskeletal:  Positive for arthralgias.   Skin:  Positive for wound.   Objective:     Vital Signs (Most Recent):  Temp: 97.5 °F (36.4 °C) (04/19/23 1616)  Pulse: 80 (04/19/23 1616)  Resp: 17 (04/19/23 1638)  BP: (!) 123/53 (04/19/23 1616)  SpO2: 98 % (04/19/23 1616)   Vital Signs (24h Range):  Temp:  [97.2 °F (36.2 °C)-98.1 °F (36.7 °C)] 97.5 °F (36.4 °C)  Pulse:  [68-82] 80  Resp:  [14-18] 17  SpO2:  [93 %-98 %] 98 %  BP: (107-123)/(47-55) 123/53     Weight: 81.2 kg (179 lb 0.2 oz)  Body mass index is 24.97 kg/m².    Intake/Output Summary (Last 24 hours) at 4/19/2023 1806  Last data filed at 4/19/2023 1739  Gross per 24 hour   Intake 400 ml   Output 1500 ml   Net -1100 ml        Physical Exam  Vitals and nursing note reviewed.   Constitutional:       General: He is not in acute distress.     Appearance: He is well-developed.   HENT:      Head: Normocephalic and atraumatic.   Eyes:      Conjunctiva/sclera: Conjunctivae normal.      Pupils: Pupils are equal, round, and reactive to light.   Neck:      Vascular: No JVD.   Cardiovascular:      Rate and Rhythm: Normal rate and regular rhythm.      Heart sounds: Normal heart sounds.   Pulmonary:      Effort: Pulmonary effort is normal. No respiratory distress.      Breath sounds: Normal breath sounds. No wheezing.   Abdominal:      General: Bowel sounds are normal. There is no distension.      Palpations: Abdomen is soft.      Tenderness: There is no abdominal tenderness. There is no guarding.   Musculoskeletal:          General: Tenderness (R BKA) present. Normal range of motion.      Cervical back: Normal range of motion and neck supple.   Skin:     General: Skin is warm and dry.      Capillary Refill: Capillary refill takes less than 2 seconds.      Findings: Erythema present.   Neurological:      Mental Status: He is alert and oriented to person, place, and time.   Psychiatric:         Behavior: Behavior normal.       Significant Labs: All pertinent labs within the past 24 hours have been reviewed.  CBC:   Recent Labs   Lab 04/18/23 0523 04/19/23 0417   WBC 5.82 2.12*   HGB 8.6* 13.4*   HCT 28.6* 45.6    134*       CMP:   Recent Labs   Lab 04/18/23 0523 04/19/23 0417   * 135*   K 4.4 3.9    113*   CO2 17* 15*   GLU 94 168*   BUN 21 20   CREATININE 1.7* 1.7*   CALCIUM 8.7 7.1*   ANIONGAP 7* 7*             Significant Imaging: I have reviewed all pertinent imaging results/findings within the past 24 hours.

## 2023-04-19 NOTE — CONSULTS
Pharmacokinetic Assessment Follow Up: IV Vancomycin    Vancomycin serum concentration assessment(s):    The random level was drawn correctly and can be used to guide therapy at this time. The measurement is within the desired definitive target range of 15 to 20 mcg/mL.    Vancomycin Regimen Plan:    Give Vancomycin 1000 mg IV x 1 dose.     Re-dose when the random level is less than 20 mcg/mL, next level to be drawn at 1900 on 4/20/23.    Drug levels (last 3 results):  Recent Labs   Lab Result Units 04/18/23  1310 04/19/23  1548   Vancomycin, Random ug/mL 13.0 19.6     Pharmacy will continue to follow and monitor vancomycin.    Please contact pharmacy at extension [phone] for questions regarding this assessment.    Thank you for the consult,   Angie Phipps PharmD       Patient brief summary:  Lavelle Ladd is a 67 y.o. male initiated on antimicrobial therapy with IV Vancomycin for treatment of bone/joint infection    The patient's current regimen is pulse dosing (dosing by level) when random level is less than 20 mcg/mL.    Drug Allergies:   Review of patient's allergies indicates:  No Known Allergies    Actual Body Weight: 81.2 kg    Renal Function:   Estimated Creatinine Clearance: 43.7 mL/min (A) (based on SCr of 1.7 mg/dL (H)).,     Dialysis Method (if applicable):  N/A    CBC (last 72 hours):  Recent Labs   Lab Result Units 04/17/23  1045 04/17/23  1346 04/18/23  0523 04/19/23  0417   WBC K/uL 10.66 9.12 5.82 2.12*   Hemoglobin g/dL 10.0* 9.1* 8.6* 13.4*   Hematocrit % 34.2* 29.9* 28.6* 45.6   Platelets K/uL 320 280 288 134*   Gran % % 66.6 65.1 52.8 59.0   Lymph % % 20.4 21.9 29.4 25.5   Mono % % 9.8 9.9 11.9 9.4   Eosinophil % % 2.2 2.1 4.5 4.7   Basophil % % 0.4 0.3 0.9 0.9   Differential Method  Automated Automated Automated Automated       Metabolic Panel (last 72 hours):  Recent Labs   Lab Result Units 04/17/23  1045 04/17/23  1346 04/17/23  1534 04/18/23  0523 04/19/23  0417   Sodium mmol/L 130* 129*   --  133* 135*   Sodium, Urine mmol/L  --   --  65  --   --    Potassium mmol/L 5.1 4.5  --  4.4 3.9   Chloride mmol/L 103 104  --  109 113*   CO2 mmol/L 16* 16*  --  17* 15*   Glucose mg/dL 120* 140*  --  94 168*   Glucose, UA   --   --  Negative  --   --    BUN mg/dL 22 21  --  21 20   Creatinine mg/dL 1.9* 1.9*  --  1.7* 1.7*   Creatinine, Urine mg/dL  --   --  45.0  --   --    Albumin g/dL  --  2.5*  --   --   --    Total Bilirubin mg/dL  --  0.2  --   --   --    Alkaline Phosphatase U/L  --  114  --   --   --    AST U/L  --  53*  --   --   --    ALT U/L  --  22  --   --   --        Vancomycin Administrations:  vancomycin given in the last 96 hours                     vancomycin 1,250 mg in dextrose 5 % (D5W) 250 mL IVPB (Vial-Mate) (mg) 1,250 mg New Bag 04/18/23 1515    vancomycin 1,500 mg in dextrose 5 % (D5W) 250 mL IVPB (Vial-Mate) (mg) 1,500 mg New Bag 04/17/23 1809                    Microbiologic Results:  Microbiology Results (last 7 days)       Procedure Component Value Units Date/Time    Rapid Organism ID by PCR (from Blood culture) [[id number]]  (Abnormal) Collected: 04/17/23 1347    Order Status: Completed Updated: 04/19/23 1530     Enterococcus faecalis Not Detected     Enterococcus faecium Not Detected     Listeria Monocytogenes Not Detected     Staphylococcus spp. Not Detected     Staphylococcus aureus Not Detected     Staphylococcus epidermidis Not Detected     Staphylococcus lugdunensis Not Detected     Streptococcus species Not Detected     Streptococcus agalactiae Not Detected     Streptococcus pneumoniae Not Detected     Streptococcus pyogenes Not Detected     Acinetobacter calcoaceticus/baumannii complex Not Detected     Bacteroides fragilis Not Detected     Enterobacerales Not Detected     Enterobacter cloacae complex Not Detected     Escherichia Not Detected     Klebsiella aerogenes Not Detected     Klebsiella oxytoca Not Detected     Klebsiella pneumoniae group Not Detected     Proteus Not  Detected     Salmonella sp Not Detected     Serratia marcescens Not Detected     Haemophilus influenzae Not Detected     Neisseria meningtidis Not Detected     Pseudomonas aeruginosa Not Detected     Stenotrophomonas maltophilia Not Detected     Candida albicans Not Detected     Candida auris Not Detected     Palmira glabrata Detected     Palmira krusei Not Detected     Candida parapsilosis Not Detected     Candida tropicalis Not Detected     Cryptococcus neoformans/gattii Not Detected     CTX-M (ESBL ) Test Not Applicable     IMP (Carbapenem resistant) Test Not Applicable     KPC resistance gene (Carbapenem resistant) Test Not Applicable     mcr-1  Test Not Applicable     mec A/C  Test Not Applicable     mec A/C and MREJ (MRSA) gene Test Not Applicable     NDM (Carbapenem resistant) Test Not Applicable     OXA-48-like (Carbapenem resistant) Test Not Applicable     van A/B (VRE gene) Test Not Applicable     VIM (Carbapenem resistant) Test Not Applicable    Narrative:      Aerobic and anaerobic    AFB Culture & Smear [[id number]] Collected: 04/18/23 1310    Order Status: Completed Specimen: Abscess from Knee, Right Updated: 04/19/23 1352     AFB CULTURE STAIN No acid fast bacilli seen.    Blood culture x two cultures. Draw prior to antibiotics. [[id number]] Collected: 04/17/23 1347    Order Status: Completed Specimen: Blood from Other (Specify) Updated: 04/19/23 0953     Blood Culture, Routine Gram stain susan bottle: yeast      Results called to and read back by:Sebastien Rowley LPN 04/19/2023  09:50    Narrative:      Aerobic and anaerobic    Urine culture [[id number]] Collected: 04/17/23 1534    Order Status: Completed Specimen: Urine Updated: 04/19/23 0826     Urine Culture, Routine No significant growth    Narrative:      Specimen Source->Urine    Blood culture x two cultures. Draw prior to antibiotics. [[id number]] Collected: 04/17/23 1347    Order Status: Completed Specimen: Blood from Other (Specify) Updated:  04/19/23 0612     Blood Culture, Routine No Growth to date      No Growth to date    Narrative:      Aerobic and anaerobic    Aerobic culture [[id number]] Collected: 04/18/23 1310    Order Status: Sent Specimen: Abscess from Knee, Right Updated: 04/18/23 2114    Culture, Anaerobe [[id number]] Collected: 04/18/23 1310    Order Status: Sent Specimen: Abscess from Knee, Right Updated: 04/18/23 2114          Thank you for allowing us to participate in this patient's care.     Angie Phipps, PharmBERNARDO 04/19/2023 4:41 PM

## 2023-04-19 NOTE — PLAN OF CARE
Discussed poc with pt, pt verbalized understanding    Purposeful rounding every 2hours    VS wnl  Cardiac monitoring in use, pt is NSR, tele monitor # [id number] NSR  Pt remains on Contact precautions.  Fall precautions in place, remains injury free    Pain under control with PRN meds    IVFs  Accurate I&Os  Abx given as prescribed  Bed locked at lowest position  Call light within reach    Chart check complete  Will cont with POC

## 2023-04-19 NOTE — SUBJECTIVE & OBJECTIVE
Interval History: Pt was seen and examined. Labs and meds reviewed. Discussed with other providers. No new events, no c/o's.    Review of patient's allergies indicates:  No Known Allergies  Current Facility-Administered Medications   Medication Frequency    acetaminophen tablet 500 mg Q6H PRN    acetaminophen tablet 500 mg Q8H PRN    albuterol-ipratropium 2.5 mg-0.5 mg/3 mL nebulizer solution 3 mL Q6H PRN    atorvastatin tablet 80 mg Daily    ceFEPIme (MAXIPIME) 2 g in dextrose 5 % in water (D5W) 5 % 50 mL IVPB (MB+) Q12H    cholecalciferol (vitamin D3) 125 mcg (5,000 unit) tablet 5,000 Units Every Mon    dextrose 10% bolus 125 mL 125 mL PRN    dextrose 10% bolus 250 mL 250 mL PRN    dextrose 40 % gel 15,000 mg PRN    dextrose 40 % gel 30,000 mg PRN    diphenhydrAMINE capsule 25 mg Q6H PRN    ferrous sulfate tablet 1 each BID    gabapentin capsule 300 mg TID    glucagon (human recombinant) injection 1 mg PRN    HYDROcodone-acetaminophen  mg per tablet 1 tablet Q6H PRN    insulin aspart U-100 pen 0-5 Units QID (AC + HS) PRN    insulin detemir U-100 (Levemir) pen 12 Units BID    levothyroxine tablet 125 mcg Daily    melatonin tablet 6 mg Nightly PRN    metroNIDAZOLE tablet 500 mg Q8H    micafungin 100 mg in sodium chloride 0.9 % 100 mL IVPB (MB+) Q24H    morphine injection 4 mg Q6H PRN    multivitamin tablet Daily    naloxone 0.4 mg/mL injection 0.02 mg PRN    ondansetron disintegrating tablet 4 mg Q8H PRN    sodium chloride 0.9% flush 10 mL Q12H PRN    tacrolimus capsule 0.5 mg Daily PM    tacrolimus capsule 1 mg Daily AM    vancomycin - pharmacy to dose pharmacy to manage frequency       Objective:     Vital Signs (Most Recent):  Temp: 97.2 °F (36.2 °C) (04/19/23 1158)  Pulse: 68 (04/19/23 1330)  Resp: 17 (04/19/23 1408)  BP: (!) 111/50 (04/19/23 1158)  SpO2: (!) 94 % (04/19/23 1300)   Vital Signs (24h Range):  Temp:  [97.2 °F (36.2 °C)-98.1 °F (36.7 °C)] 97.2 °F (36.2 °C)  Pulse:  [68-77] 68  Resp:  [14-18]  17  SpO2:  [93 %-98 %] 94 %  BP: (107-113)/(47-55) 111/50     Weight: 81.2 kg (179 lb 0.2 oz) (04/19/23 0045)  Body mass index is 24.97 kg/m².  Body surface area is 2.02 meters squared.    I/O last 3 completed shifts:  In: 250.1 [IV Piggyback:250.1]  Out: 1000 [Urine:1000]    Physical Exam  Constitutional:       Appearance: Normal appearance.   Cardiovascular:      Rate and Rhythm: Normal rate and regular rhythm.      Pulses: Normal pulses.      Heart sounds: Normal heart sounds.   Pulmonary:      Effort: Pulmonary effort is normal.      Breath sounds: Normal breath sounds.   Musculoskeletal:      Right lower leg: No edema.      Left lower leg: No edema.   Neurological:      Mental Status: He is alert.       Significant Labs: reviewed  BMP  Lab Results   Component Value Date     (L) 04/19/2023    K 3.9 04/19/2023     (H) 04/19/2023    CO2 15 (L) 04/19/2023    BUN 20 04/19/2023    CREATININE 1.7 (H) 04/19/2023    CALCIUM 7.1 (L) 04/19/2023    ANIONGAP 7 (L) 04/19/2023    EGFRNORACEVR 43 (A) 04/19/2023     Lab Results   Component Value Date    WBC 2.12 (L) 04/19/2023    HGB 13.4 (L) 04/19/2023    HCT 45.6 04/19/2023    MCV 82 04/19/2023     (L) 04/19/2023     Prograf 6.2

## 2023-04-19 NOTE — ASSESSMENT & PLAN NOTE
Bicarb of 16, anion gap 9, likely secondary to underlying kidney disease   Monitor and consider replacement as needed   Follow up with Nephrology    4/19  -resume po bicarb

## 2023-04-19 NOTE — SUBJECTIVE & OBJECTIVE
Past Medical History:   Diagnosis Date    DINORAH (acute kidney injury) 2016    Arthritis     CAD in native artery 2019    CHF (congestive heart failure)     Chronic obstructive pulmonary disease 2016    Coronary artery disease involving native coronary artery of native heart without angina pectoris 2016    CRI (chronic renal insufficiency) 2019     donor kidney transplant for DM 16     Induction with Thymo x3 and IV solumedrol to total 875mg  Kidney Biopsy  2016: 16 glomeruli, ACR type 1 AVR type 2, significant microcirculatory changes, c4d negative, No DSA, 5 to10% fibrosis. Treated with thymo x8 2016- no rejection      Diastolic heart failure     Encounter for blood transfusion     ESRD on RRT since 10/2013 10/29/2013    Biopsy proven diabetic nephropathy and lymphoplasmacytic interstitial infiltrate not c/w with AIN (ddx sjogrens or assoc with tamm-horsefall protein extravasation)     GERD (gastroesophageal reflux disease)     History of hepatitis C, s/p successful Rx w/ SVR12 - 2017    Completed 12 weeks harvoni w/ SVR    Hyperlipidemia     Hypertension     PAD (peripheral artery disease) 2019    PIC line (peripherally inserted central catheter) flush     Prophylactic immunotherapy     Proteinuria     PVD (peripheral vascular disease) 2017    RLE BKA CT 16 Extensive atherosclerotic disease of the aorta and mesenteric arteries.     Renal hypertension     Type 2 diabetes mellitus with diabetic neuropathy, with long-term current use of insulin 2016    Vitamin B12 deficiency        Past Surgical History:   Procedure Laterality Date    ANGIOGRAPHY OF LOWER EXTREMITY N/A 2022    Procedure: ANGIOGRAM, LOWER EXTREMITY/left leg;  Surgeon: Donal Mcdonald MD;  Location: [facility] CATH LAB;  Service: Cardiovascular;  Laterality: N/A;    ANGIOGRAPHY OF LOWER EXTREMITY N/A 2022    Procedure: ANGIOGRAM, LOWER EXTREMITY/left leg;  Surgeon: Donal  MD Tamara;  Location: [facility] CATH LAB;  Service: Peripheral Vascular;  Laterality: N/A;  resched from 9/23 pt ready now    AORTOGRAPHY WITH SERIALOGRAPHY N/A 6/14/2018    Procedure: LEFT LEG ANGIOGRAM;  Surgeon: Donal Mcdonald MD;  Location: [facility] CATH LAB;  Service: Vascular;  Laterality: N/A;    APPLICATION OF LARGE EXTERNAL FIXATION DEVICE TO TIBIA Left 8/4/2022    Procedure: APPLICATION, EXTERNAL FIXATION DEVICE, LARGE, TIBIA;  Surgeon: Good Villa MD;  Location: [facility];  Service: Orthopedics;  Laterality: Left;    av bovine graft      Left UE    AV FISTULA PLACEMENT      left UE    BELOW KNEE AMPUTATION OF LOWER EXTREMITY Left 10/6/2022    Procedure: AMPUTATION, BELOW KNEE;  Surgeon: Donal Mcdonald MD;  Location: [facility];  Service: Vascular;  Laterality: Left;    CARDIAC CATHETERIZATION  02/2015    CLOSED REDUCTION OF INJURY OF TIBIA Left 8/4/2022    Procedure: CLOSED REDUCTION, TIBIA;  Surgeon: Good Villa MD;  Location: [facility];  Service: Orthopedics;  Laterality: Left;  Closed reduction left tibial and fibular shaft fractures    CLOSURE OF WOUND Left 9/24/2018    Procedure: CLOSURE, WOUND;  Surgeon: Karla Wheeler DPM;  Location: [facility] OR;  Service: Podiatry;  Laterality: Left;  Secondary Wound closure, extensive    CLOSURE OF WOUND Left 11/5/2018    Procedure: CLOSURE, WOUND;  Surgeon: Karla Wheeler DPM;  Location: [facility];  Service: Podiatry;  Laterality: Left;    DEBRIDEMENT OF MULTIPLE METATARSAL BONES Left 11/5/2018    Procedure: DEBRIDEMENT, METATARSAL BONE, 2 OR MORE BONES;  Surgeon: Karla Wheeler DPM;  Location: [facility];  Service: Podiatry;  Laterality: Left;    EXCISION OF SKIN Left 9/27/2019    Procedure: EXCISION, SKIN;  Surgeon: Lenard Alarcon MD;  Location: [address];  Service: Plastics;  Laterality: Left;  Plastics set, NIMS monitor, ACell    FOOT AMPUTATION THROUGH METATARSAL Left 9/21/2018    Procedure: AMPUTATION, FOOT, TRANSMETATARSAL;  Surgeon: Karla Wheeler  LASHONDA;  Location: [facility] OR;  Service: Podiatry;  Laterality: Left;    FOOT AMPUTATION THROUGH METATARSAL Left 10/31/2018    Procedure: AMPUTATION, FOOT, TRANSMETATARSAL;  Surgeon: Karla Wheeler DPM;  Location: [facility] OR;  Service: Podiatry;  Laterality: Left;    FOOT AMPUTATION THROUGH METATARSAL Left 11/5/2018    Procedure: AMPUTATION, FOOT, TRANSMETATARSAL;  Surgeon: Karla Wheeler DPM;  Location: [facility] OR;  Service: Podiatry;  Laterality: Left;  revisional transmetatarsal amputation, Left foot    IRRIGATION AND DEBRIDEMENT OF LOWER EXTREMITY Left 10/31/2018    Procedure: IRRIGATION AND DEBRIDEMENT, LOWER EXTREMITY;  Surgeon: Karla Wheeler DPM;  Location: [facility] OR;  Service: Podiatry;  Laterality: Left;    KIDNEY TRANSPLANT  05/21/2016    LEFT HEART CATHETERIZATION Left 7/21/2019    Procedure: CATHETERIZATION, HEART, LEFT;  Surgeon: Andrew Valdez MD;  Location: [facility] CATH LAB;  Service: Cardiology;  Laterality: Left;    LEG AMPUTATION THROUGH KNEE  2011    right LE, started as nail puncture leading to diabetic ulcer    REMOVAL OF EXTERNAL FIXATION DEVICE Left 9/17/2022    Procedure: REMOVAL, EXTERNAL FIXATION DEVICE;  Surgeon: Kathleen Zazueta MD;  Location: [facility] OR;  Service: Orthopedics;  Laterality: Left;    SKIN FULL THICKNESS GRAFT Left 10/7/2019    Procedure: APPLICATION, GRAFT, SKIN, FULL-THICKNESS;  Surgeon: Lenard Alarcon MD;  Location: [address];  Service: Plastics;  Laterality: Left;    SURGICAL REMOVAL OF LESION OF FACE Right 10/7/2019    Procedure: EXCISION, LESION, FACE;  Surgeon: Lenard Alarcon MD;  [address];  Service: Plastics;  Laterality: Right;       Review of patient's allergies indicates:  No Known Allergies    Medications:  Medications Prior to Admission   Medication Sig    acetaminophen (TYLENOL) 500 MG tablet Take 2 tablets (1,000 mg total) by mouth 3 (three) times daily.    amLODIPine (NORVASC) 10 MG tablet Take 10 mg by mouth once daily.    ascorbic acid,  vitamin C, (VITAMIN C) 500 MG tablet Take 1 tablet by mouth every morning.    aspirin (ECOTRIN) 81 MG EC tablet Take 1 tablet (81 mg total) by mouth once daily.    atorvastatin (LIPITOR) 80 MG tablet Take 80 mg by mouth once daily.    carvediloL (COREG) 3.125 MG tablet Take 3.125 mg by mouth 2 (two) times a day.    cholecalciferol, vitamin D3, 125 mcg (5,000 unit) Tab Take 5,000 Units by mouth every Monday.    dextrose 5 % in water (D5W) 5 % PgBk 50 mL with ceFEPIme 2 gram SolR 2 g Inject 2 g into the vein every 12 (twelve) hours.    gabapentin (NEURONTIN) 300 MG capsule Take 300 mg by mouth 3 (three) times daily.    HUMULIN R REGULAR U-100 INSULN 100 unit/mL injection Inject  into the skin 2 (two) times daily before meals. If blood sugar 200-250= 4 units, 251-300= 6 units, 301-350= 8 units, 351-400= 10 units, 401- 450= 12 units, >450= 14 units and call MD.    insulin detemir U-100, Levemir, 100 unit/mL (3 mL) SubQ InPn pen Inject 12 Units into the skin 2 (two) times daily.    levothyroxine (SYNTHROID) 125 MCG tablet Take 125 mcg by mouth once daily.    meloxicam (MOBIC) 7.5 MG tablet Take 7.5 mg by mouth once daily.    metroNIDAZOLE (FLAGYL) 500 MG tablet Take 1 tablet (500 mg total) by mouth every 8 (eight) hours.    multivitamin Tab Take 1 tablet by mouth once daily.    mycophenolate (CELLCEPT) 250 mg Cap Take 250 mg by mouth 2 (two) times daily.    nitroGLYCERIN (NITROSTAT) 0.4 MG SL tablet Place 1 tablet (0.4 mg total) under the tongue every 5 (five) minutes as needed.    ondansetron (ZOFRAN) 4 MG tablet Take 4 mg by mouth every 8 (eight) hours as needed for Nausea.    semaglutide (OZEMPIC) 1 mg/dose (2 mg/1.5 mL) PnIj Inject 1 mg under the skin every 7 days.    sertraline (ZOLOFT) 25 MG tablet Take 25 mg by mouth once daily.    tacrolimus (PROGRAF) 0.5 MG Cap Take 2 capsules (1 mg total) by mouth every 12 (twelve) hours.    traMADoL (ULTRAM) 50 mg tablet Take 1 tablet (50 mg total) by mouth every 8 (eight)  hours as needed for Pain.     Antibiotics (From admission, onward)      Start     Stop Route Frequency Ordered    04/17/23 2200  metroNIDAZOLE tablet 500 mg         -- Oral Every 8 hours 04/17/23 1640    04/17/23 2100  ceFEPIme (MAXIPIME) 2 g in dextrose 5 % in water (D5W) 5 % 50 mL IVPB (MB+)         -- IV Every 12 hours 04/17/23 1640    04/17/23 1609  vancomycin - pharmacy to dose  (vancomycin IVPB)        See Hyperspace for full Linked Orders Report.    -- IV pharmacy to manage frequency 04/17/23 1509          Antifungals (From admission, onward)      None          Antivirals (From admission, onward)      None             Immunization History   Administered Date(s) Administered    Hepatitis A 03/18/2014    Hepatitis A / Hepatitis B 03/05/2013, 04/09/2013, 09/05/2013    Influenza (FLUAD) - Trivalent - Adjuvanted - PF (65+) 11/09/2019    Influenza - High Dose - PF (65 years and older) 08/23/2018, 09/29/2019, 10/05/2020    Influenza - Quadrivalent - PF *Preferred* (6 months and older) 09/26/2017    Pneumococcal Conjugate - 13 Valent 08/23/2018    Pneumococcal Polysaccharide - 23 Valent 08/20/2019       Family History       Problem Relation (Age of Onset)    Cancer Father    Diabetes Father    Heart failure Father, Mother    Stroke Father          Social History     Socioeconomic History    Marital status: Single   Occupational History    Occupation: Disabled     Employer: DISABLED   Tobacco Use    Smoking status: Former     Packs/day: 1.00     Years: 40.00     Pack years: 40.00     Types: Cigarettes     Quit date: 1/11/2013     Years since quitting: 10.2    Smokeless tobacco: Never   Substance and Sexual Activity    Alcohol use: Yes     Alcohol/week: 6.0 standard drinks     Types: 6 Cans of beer per week     Comment: seldom    Drug use: No    Sexual activity: Never   Social History Narrative    Lives alone. Retired from construction and various jobs, now retired. Would like to return to work PT to alleviate boredom.      Social Determinants of Health     Physical Activity: Inactive    Days of Exercise per Week: 0 days    Minutes of Exercise per Session: 0 min     Review of Systems   Constitutional:  Negative for activity change, appetite change, chills and diaphoresis.   Eyes:  Negative for pain, discharge and itching.   Respiratory:  Negative for apnea.    Neurological:  Negative for dizziness and facial asymmetry.   Objective:     Vital Signs (Most Recent):  Temp: 97.9 °F (36.6 °C) (04/18/23 1642)  Pulse: 74 (04/18/23 1730)  Resp: 17 (04/18/23 1642)  BP: (!) 113/50 (04/18/23 1642)  SpO2: 95 % (04/18/23 1642)   Vital Signs (24h Range):  Temp:  [97.5 °F (36.4 °C)-98.5 °F (36.9 °C)] 97.9 °F (36.6 °C)  Pulse:  [69-83] 74  Resp:  [16-18] 17  SpO2:  [95 %-100 %] 95 %  BP: ()/(46-63) 113/50     Weight: 77 kg (169 lb 12.1 oz)  Body mass index is 23.68 kg/m².    Estimated Creatinine Clearance: 43.7 mL/min (A) (based on SCr of 1.7 mg/dL (H)).    Physical Exam  Vitals and nursing note reviewed.   HENT:      Head: Normocephalic.   Cardiovascular:      Rate and Rhythm: Normal rate.   Musculoskeletal:         General: No swelling or deformity.      Comments: See pic   Skin:     General: Skin is warm.      Findings: Lesion present. No bruising.   Neurological:      General: No focal deficit present.      Mental Status: He is alert and oriented to person, place, and time.   Psychiatric:         Mood and Affect: Mood normal.             Significant Labs: Blood Culture:   Recent Labs   Lab 04/05/23  1757 04/05/23  1945 04/17/23  1347   LABBLOO Gram stain susan bottle: Gram negative rods, Gram positive cocci  Results called to and read back by: Ashley Nails RN 04/06/2023  14:48  Gram stain aer bottle: Gram positive cocci and Gram negative rods  Positive results previously called 04/06/2023  STREPTOCOCCUS AGALACTIAE (GROUP B)  Beta-hemolytic streptococci are routinely susceptible to   penicillins,cephalosporins and carbapenems.  *   PROTEUS MIRABILIS* Gram stain susan bottle: Gram negative rods  Results called to and read back by: Ashley Nails RN 04/06/2023 14:48  Gram stain aer bottle: Gram negative rods  Positive results previously called 04/07/2023  20:26  PROTEUS MIRABILIS  For susceptibility see order #[id number]  * No Growth to date  No Growth to date     CBC:   Recent Labs   Lab 04/17/23  1045 04/17/23  1346 04/18/23  0523   WBC 10.66 9.12 5.82   HGB 10.0* 9.1* 8.6*   HCT 34.2* 29.9* 28.6*    280 288     CMP:   Recent Labs   Lab 04/17/23  1045 04/17/23  1346 04/18/23  0523   * 129* 133*   K 5.1 4.5 4.4    104 109   CO2 16* 16* 17*   * 140* 94   BUN 22 21 21   CREATININE 1.9* 1.9* 1.7*   CALCIUM 8.9 8.9 8.7   PROT  --  7.0  --    ALBUMIN  --  2.5*  --    BILITOT  --  0.2  --    ALKPHOS  --  114  --    AST  --  53*  --    ALT  --  22  --    ANIONGAP 11 9 7*     Wound Culture:   Recent Labs   Lab 04/06/23  1707   LABAERO PSEUDOMONAS AERUGINOSA  Moderate  *  PROVIDENCIA STUARTII  Moderate  Skin skylar also present  *     All pertinent labs within the past 24 hours have been reviewed.    Significant Imaging: I have reviewed all pertinent imaging results/findings within the past 24 hours.

## 2023-04-19 NOTE — CONSULTS
[facility]  Infectious Disease  Consult Note    Patient Name: Lavelle Ladd  MRN: [id number]  Admission Date: 4/17/2023  Hospital Length of Stay: 0 days  Attending Physician: Steve Hanna MD  Primary Care Provider: Primary Doctor No     Isolation Status: Special Contact    Patient information was obtained from patient, past medical records and ER records.      Consults  Assessment/Plan:     Renal/  Renal transplant, status post  Follow transplant team    Endocrine  Type 2 diabetes mellitus with hyperglycemia, with long-term current use of insulin  Insulin regime as per primary team    Orthopedic  * Ulcer of right lower extremity with bone involvement without evidence of necrosis  Will follow vascular surgery \Follow repeat cultures-  Will use vanco/cefepime/flagyl for now  Will need I and d         Thank you for your consult. I will follow-up with patient. Please contact us if you have any additional questions.    Ronny Veliz MD, [facility]  Infectious Disease  [facility]    Subjective:     Principal Problem: Ulcer of right lower extremity with bone involvement without evidence of necrosis    HPI:   67  year old man with PMHx of CAD, CHF, s/p kidney transplant  on 5/21/16, COPD, hyperlipidemia, hypertension, peripheral arterial disease, peripheral vascular disease, below knee amputation of lower extremity admitted with worsening right lower extremity wound.  He was recently seen earlier this wound and was discharged to complete 6 weeks of Cefepime/flagyl.  Labs and imaging test reviewed-    Specimen: Wound from Leg, Right Updated: 04/10/23 1038      Aerobic Bacterial Culture PSEUDOMONAS AERUGINOSA   Moderate    Abnormal      PROVIDENCIA STUARTII   Moderate   Skin skylar also present    Blood culture- 04/05-strep/proteus   Component      Latest Ref Rng & Units 4/18/2023 4/17/2023 4/17/2023            1:46 PM 10:45 AM   WBC      3.90 - 12.70 K/uL 5.82 9.12 10.66       Past Medical History:   Diagnosis  Date    DINORAH (acute kidney injury) 2016    Arthritis     CAD in native artery 2019    CHF (congestive heart failure)     Chronic obstructive pulmonary disease 2016    Coronary artery disease involving native coronary artery of native heart without angina pectoris 2016    CRI (chronic renal insufficiency) 2019     donor kidney transplant for DM 16     Induction with Thymo x3 and IV solumedrol to total 875mg  Kidney Biopsy  2016: 16 glomeruli, ACR type 1 AVR type 2, significant microcirculatory changes, c4d negative, No DSA, 5 to10% fibrosis. Treated with thymo x8 2016- no rejection      Diastolic heart failure     Encounter for blood transfusion     ESRD on RRT since 10/2013 10/29/2013    Biopsy proven diabetic nephropathy and lymphoplasmacytic interstitial infiltrate not c/w with AIN (ddx sjogrens or assoc with tamm-horsefall protein extravasation)     GERD (gastroesophageal reflux disease)     History of hepatitis C, s/p successful Rx w/ SVR12 - 2017    Completed 12 weeks harvoni w/ SVR    Hyperlipidemia     Hypertension     PAD (peripheral artery disease) 2019    PIC line (peripherally inserted central catheter) flush     Prophylactic immunotherapy     Proteinuria     PVD (peripheral vascular disease) 2017    RLE BKA CT 16 Extensive atherosclerotic disease of the aorta and mesenteric arteries.     Renal hypertension     Type 2 diabetes mellitus with diabetic neuropathy, with long-term current use of insulin 2016    Vitamin B12 deficiency        Past Surgical History:   Procedure Laterality Date    ANGIOGRAPHY OF LOWER EXTREMITY N/A 2022    Procedure: ANGIOGRAM, LOWER EXTREMITY/left leg;  Surgeon: Donal Mcdonald MD;  Location: [facility] CATH LAB;  Service: Cardiovascular;  Laterality: N/A;    ANGIOGRAPHY OF LOWER EXTREMITY N/A 2022    Procedure: ANGIOGRAM, LOWER EXTREMITY/left leg;  Surgeon: Donal Mcdonald MD;   Location: [facility] CATH LAB;  Service: Peripheral Vascular;  Laterality: N/A;  resched from 9/23 pt ready now    AORTOGRAPHY WITH SERIALOGRAPHY N/A 6/14/2018    Procedure: LEFT LEG ANGIOGRAM;  Surgeon: Donal Mcdonald MD;  Location: [facility] CATH LAB;  Service: Vascular;  Laterality: N/A;    APPLICATION OF LARGE EXTERNAL FIXATION DEVICE TO TIBIA Left 8/4/2022    Procedure: APPLICATION, EXTERNAL FIXATION DEVICE, LARGE, TIBIA;  Surgeon: Good Villa MD;  Location: [facility] OR;  Service: Orthopedics;  Laterality: Left;    av bovine graft      Left UE    AV FISTULA PLACEMENT      left UE    BELOW KNEE AMPUTATION OF LOWER EXTREMITY Left 10/6/2022    Procedure: AMPUTATION, BELOW KNEE;  Surgeon: Donal Mcdonald MD;  Location: [facility];  Service: Vascular;  Laterality: Left;    CARDIAC CATHETERIZATION  02/2015    CLOSED REDUCTION OF INJURY OF TIBIA Left 8/4/2022    Procedure: CLOSED REDUCTION, TIBIA;  Surgeon: Good Villa MD;  Location: [facility];  Service: Orthopedics;  Laterality: Left;  Closed reduction left tibial and fibular shaft fractures    CLOSURE OF WOUND Left 9/24/2018    Procedure: CLOSURE, WOUND;  Surgeon: Karla Wheeler DPM;  Location: [facility] OR;  Service: Podiatry;  Laterality: Left;  Secondary Wound closure, extensive    CLOSURE OF WOUND Left 11/5/2018    Procedure: CLOSURE, WOUND;  Surgeon: Karla Wheeler DPM;  Location: [facility];  Service: Podiatry;  Laterality: Left;    DEBRIDEMENT OF MULTIPLE METATARSAL BONES Left 11/5/2018    Procedure: DEBRIDEMENT, METATARSAL BONE, 2 OR MORE BONES;  Surgeon: Karla Wheeler DPM;  Location: [facility] OR;  Service: Podiatry;  Laterality: Left;    EXCISION OF SKIN Left 9/27/2019    Procedure: EXCISION, SKIN;  Surgeon: Lenard Alarcon MD;  Location: [address];  Service: Plastics;  Laterality: Left;  Plastics set, NIMS monitor, [employer]    FOOT AMPUTATION THROUGH METATARSAL Left 9/21/2018    Procedure: AMPUTATION, FOOT, TRANSMETATARSAL;  Surgeon: Karla WOODY  LASHONDA Wheeler;  Location: [facility];  Service: Podiatry;  Laterality: Left;    FOOT AMPUTATION THROUGH METATARSAL Left 10/31/2018    Procedure: AMPUTATION, FOOT, TRANSMETATARSAL;  Surgeon: Karla Wheeler DPM;  Location: [facility];  Service: Podiatry;  Laterality: Left;    FOOT AMPUTATION THROUGH METATARSAL Left 11/5/2018    Procedure: AMPUTATION, FOOT, TRANSMETATARSAL;  Surgeon: Karla Wheeler DPM;  Location: [facility];  Service: Podiatry;  Laterality: Left;  revisional transmetatarsal amputation, Left foot    IRRIGATION AND DEBRIDEMENT OF LOWER EXTREMITY Left 10/31/2018    Procedure: IRRIGATION AND DEBRIDEMENT, LOWER EXTREMITY;  Surgeon: Karla Wheeler DPM;  Location: [facility];  Service: Podiatry;  Laterality: Left;    KIDNEY TRANSPLANT  05/21/2016    LEFT HEART CATHETERIZATION Left 7/21/2019    Procedure: CATHETERIZATION, HEART, LEFT;  Surgeon: Andrew Valdez MD;  Location: [facility] CATH LAB;  Service: Cardiology;  Laterality: Left;    LEG AMPUTATION THROUGH KNEE  2011    right LE, started as nail puncture leading to diabetic ulcer    REMOVAL OF EXTERNAL FIXATION DEVICE Left 9/17/2022    Procedure: REMOVAL, EXTERNAL FIXATION DEVICE;  Surgeon: Kathleen Zazueta MD;  Location: [facility];  Service: Orthopedics;  Laterality: Left;    SKIN FULL THICKNESS GRAFT Left 10/7/2019    Procedure: APPLICATION, GRAFT, SKIN, FULL-THICKNESS;  Surgeon: Lenard Alarcon MD;  [address];  Service: Plastics;  Laterality: Left;    SURGICAL REMOVAL OF LESION OF FACE Right 10/7/2019    Procedure: EXCISION, LESION, FACE;  Surgeon: Lenard Alarcon MD;  [address];  Service: Plastics;  Laterality: Right;       Review of patient's allergies indicates:  No Known Allergies    Medications:  Medications Prior to Admission   Medication Sig    acetaminophen (TYLENOL) 500 MG tablet Take 2 tablets (1,000 mg total) by mouth 3 (three) times daily.    amLODIPine (NORVASC) 10 MG tablet Take 10 mg by mouth once  daily.    ascorbic acid, vitamin C, (VITAMIN C) 500 MG tablet Take 1 tablet by mouth every morning.    aspirin (ECOTRIN) 81 MG EC tablet Take 1 tablet (81 mg total) by mouth once daily.    atorvastatin (LIPITOR) 80 MG tablet Take 80 mg by mouth once daily.    carvediloL (COREG) 3.125 MG tablet Take 3.125 mg by mouth 2 (two) times a day.    cholecalciferol, vitamin D3, 125 mcg (5,000 unit) Tab Take 5,000 Units by mouth every Monday.    dextrose 5 % in water (D5W) 5 % PgBk 50 mL with ceFEPIme 2 gram SolR 2 g Inject 2 g into the vein every 12 (twelve) hours.    gabapentin (NEURONTIN) 300 MG capsule Take 300 mg by mouth 3 (three) times daily.    HUMULIN R REGULAR U-100 INSULN 100 unit/mL injection Inject  into the skin 2 (two) times daily before meals. If blood sugar 200-250= 4 units, 251-300= 6 units, 301-350= 8 units, 351-400= 10 units, 401- 450= 12 units, >450= 14 units and call MD.    insulin detemir U-100, Levemir, 100 unit/mL (3 mL) SubQ InPn pen Inject 12 Units into the skin 2 (two) times daily.    levothyroxine (SYNTHROID) 125 MCG tablet Take 125 mcg by mouth once daily.    meloxicam (MOBIC) 7.5 MG tablet Take 7.5 mg by mouth once daily.    metroNIDAZOLE (FLAGYL) 500 MG tablet Take 1 tablet (500 mg total) by mouth every 8 (eight) hours.    multivitamin Tab Take 1 tablet by mouth once daily.    mycophenolate (CELLCEPT) 250 mg Cap Take 250 mg by mouth 2 (two) times daily.    nitroGLYCERIN (NITROSTAT) 0.4 MG SL tablet Place 1 tablet (0.4 mg total) under the tongue every 5 (five) minutes as needed.    ondansetron (ZOFRAN) 4 MG tablet Take 4 mg by mouth every 8 (eight) hours as needed for Nausea.    semaglutide (OZEMPIC) 1 mg/dose (2 mg/1.5 mL) PnIj Inject 1 mg under the skin every 7 days.    sertraline (ZOLOFT) 25 MG tablet Take 25 mg by mouth once daily.    tacrolimus (PROGRAF) 0.5 MG Cap Take 2 capsules (1 mg total) by mouth every 12 (twelve) hours.    traMADoL (ULTRAM) 50 mg tablet Take 1  tablet (50 mg total) by mouth every 8 (eight) hours as needed for Pain.     Antibiotics (From admission, onward)      Start     Stop Route Frequency Ordered    04/17/23 2200  metroNIDAZOLE tablet 500 mg         -- Oral Every 8 hours 04/17/23 1640    04/17/23 2100  ceFEPIme (MAXIPIME) 2 g in dextrose 5 % in water (D5W) 5 % 50 mL IVPB (MB+)         -- IV Every 12 hours 04/17/23 1640    04/17/23 1609  vancomycin - pharmacy to dose  (vancomycin IVPB)        See Hyperspace for full Linked Orders Report.    -- IV pharmacy to manage frequency 04/17/23 1509          Antifungals (From admission, onward)      None          Antivirals (From admission, onward)      None             Immunization History   Administered Date(s) Administered    Hepatitis A 03/18/2014    Hepatitis A / Hepatitis B 03/05/2013, 04/09/2013, 09/05/2013    Influenza (FLUAD) - Trivalent - Adjuvanted - PF (65+) 11/09/2019    Influenza - High Dose - PF (65 years and older) 08/23/2018, 09/29/2019, 10/05/2020    Influenza - Quadrivalent - PF *Preferred* (6 months and older) 09/26/2017    Pneumococcal Conjugate - 13 Valent 08/23/2018    Pneumococcal Polysaccharide - 23 Valent 08/20/2019       Family History       Problem Relation (Age of Onset)    Cancer Father    Diabetes Father    Heart failure Father, Mother    Stroke Father          Social History     Socioeconomic History    Marital status: Single   Occupational History    Occupation: Disabled     Employer: DISABLED   Tobacco Use    Smoking status: Former     Packs/day: 1.00     Years: 40.00     Pack years: 40.00     Types: Cigarettes     Quit date: 1/11/2013     Years since quitting: 10.2    Smokeless tobacco: Never   Substance and Sexual Activity    Alcohol use: Yes     Alcohol/week: 6.0 standard drinks     Types: 6 Cans of beer per week     Comment: seldom    Drug use: No    Sexual activity: Never   Social History Narrative    Lives alone. Retired from construction and various jobs, now  retired. Would like to return to work PT to alleviate boredom.     Social Determinants of Health     Physical Activity: Inactive    Days of Exercise per Week: 0 days    Minutes of Exercise per Session: 0 min     Review of Systems   Constitutional:  Negative for activity change, appetite change, chills and diaphoresis.   Eyes:  Negative for pain, discharge and itching.   Respiratory:  Negative for apnea.    Neurological:  Negative for dizziness and facial asymmetry.   Objective:     Vital Signs (Most Recent):  Temp: 97.9 °F (36.6 °C) (04/18/23 1642)  Pulse: 74 (04/18/23 1730)  Resp: 17 (04/18/23 1642)  BP: (!) 113/50 (04/18/23 1642)  SpO2: 95 % (04/18/23 1642)   Vital Signs (24h Range):  Temp:  [97.5 °F (36.4 °C)-98.5 °F (36.9 °C)] 97.9 °F (36.6 °C)  Pulse:  [69-83] 74  Resp:  [16-18] 17  SpO2:  [95 %-100 %] 95 %  BP: ()/(46-63) 113/50     Weight: 77 kg (169 lb 12.1 oz)  Body mass index is 23.68 kg/m².    Estimated Creatinine Clearance: 43.7 mL/min (A) (based on SCr of 1.7 mg/dL (H)).    Physical Exam  Vitals and nursing note reviewed.   HENT:      Head: Normocephalic.   Cardiovascular:      Rate and Rhythm: Normal rate.   Musculoskeletal:         General: No swelling or deformity.      Comments: See pic   Skin:     General: Skin is warm.      Findings: Lesion present. No bruising.   Neurological:      General: No focal deficit present.      Mental Status: He is alert and oriented to person, place, and time.   Psychiatric:         Mood and Affect: Mood normal.             Significant Labs: Blood Culture:   Recent Labs   Lab 04/05/23  1757 04/05/23  1945 04/17/23  1347   LABBLOO Gram stain susan bottle: Gram negative rods, Gram positive cocci  Results called to and read back by: Ashley Nails RN 04/06/2023  14:48  Gram stain aer bottle: Gram positive cocci and Gram negative rods  Positive results previously called 04/06/2023  STREPTOCOCCUS AGALACTIAE (GROUP B)  Beta-hemolytic streptococci are routinely  susceptible to   penicillins,cephalosporins and carbapenems.  *  PROTEUS MIRABILIS* Gram stain susan bottle: Gram negative rods  Results called to and read back by: Ashley Nails RN 04/06/2023 14:48  Gram stain aer bottle: Gram negative rods  Positive results previously called 04/07/2023  20:26  PROTEUS MIRABILIS  For susceptibility see order #[id number]  * No Growth to date  No Growth to date     CBC:   Recent Labs   Lab 04/17/23  1045 04/17/23  1346 04/18/23  0523   WBC 10.66 9.12 5.82   HGB 10.0* 9.1* 8.6*   HCT 34.2* 29.9* 28.6*    280 288     CMP:   Recent Labs   Lab 04/17/23  1045 04/17/23  1346 04/18/23  0523   * 129* 133*   K 5.1 4.5 4.4    104 109   CO2 16* 16* 17*   * 140* 94   BUN 22 21 21   CREATININE 1.9* 1.9* 1.7*   CALCIUM 8.9 8.9 8.7   PROT  --  7.0  --    ALBUMIN  --  2.5*  --    BILITOT  --  0.2  --    ALKPHOS  --  114  --    AST  --  53*  --    ALT  --  22  --    ANIONGAP 11 9 7*     Wound Culture:   Recent Labs   Lab 04/06/23  1707   LABAERO PSEUDOMONAS AERUGINOSA  Moderate  *  PROVIDENCIA STUARTII  Moderate  Skin skylar also present  *     All pertinent labs within the past 24 hours have been reviewed.    Significant Imaging: I have reviewed all pertinent imaging results/findings within the past 24 hours.
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
## 2023-04-19 NOTE — ASSESSMENT & PLAN NOTE
Will follow vascular surgery \Follow repeat cultures-  Will use vanco/cefepime/flagyl for now  Will need I and d

## 2023-04-19 NOTE — ASSESSMENT & PLAN NOTE
Presented with right stump pain (8/10) and discharge; (gradual worsening of lesion with findings of exposed bone)  MRI as of 4/9- Findings concerning for early osteomyelitis of the stump of the tibia about the wound margin.  Xray tibia/ fibula right- Below the knee amputation changes.  No acute fracture or suspicious osseous lesion.  Anatomical joint alignment.  No definite subcutaneous gas.  Soft tissue wound extending to the osseous margin of the distal tibia.  No definite associated erosive ostia changes.  S/p Application of large external fixation device to tibia (Left, 8/4/2022); Closed reduction of injury of tibia (Left, 8/4/2022); Removal of external fixation device (Left, 9/17/2022); Angiography of lower extremity (N/A, 9/23/2022); Angiography of lower extremity (N/A, 9/29/2022); and Below knee amputation of lower extremity (Left, 10/6/2022 ;    Recently discharged on 04/13 on IV cefepime, p.o. Flagyl for 6 weeks' course, with end of treatment 5/25; status post PICC line placed on 04/11 4/18  - Vascular following- rec R AKA  - continue antibiotics for now     Afebrile, no leukocytosis on arrival, no tachycardia, no tachypnea, blood pressure is soft, lactic acid 0.6; received 2 L of IV fluids, antibiotics, blood cultures, vascular consulted; wound care follow up  To continue cefepime, Flagyl, follow up with vascular, NPO except for medications since midnight for any anticipated vascular intervention     4/19  Vascular recommends right AKA, patient requesting 2nd opinion   -continue vanc, cefepime and flagyl   -follow wound cultures

## 2023-04-19 NOTE — ASSESSMENT & PLAN NOTE
68 y/o male with a h/o of KTx presented with ulcer at leg stump:                  Kidney transplant recipient     s Cr above baseline but stable  CKD stage 3  K normal  Metabolic acidosis, improved, po bicarbonate was stopped  Hyponatremia, stable, advised pt to keep water intake moderate     H/o of cadaveric kidney transplant in May 2016  On immunosuppressive therapy  Prograf level within the therapeutic range  Cellcept on hold due to active infection     HTN : BP controlled  Meds reviewed     H/o of DM  Diabetic nephropathy     Med review:  Metabolic acidosis has improved. PO bicarbonate was stopped               * right ankle wound and infection     Wound cx shows Pseudomonas  Treatment and management reviewedd  Agree with current mgmt            Plans and recommendations:  As discussed above  Total time spent 70 minutes including time needed to review the records, the   patient evaluation, documentation, face-to-face discussion with the patient,   more than 50% of the time was spent on coordination of care and counseling.    Level V visit.

## 2023-04-20 NOTE — PROGRESS NOTES
[facility] Medicine  Progress Note    Patient Name: Lavelle Ladd  MRN: [id number]  Patient Class: IP- Inpatient   Admission Date: 4/17/2023  Length of Stay: 0 days  Attending Physician: Steve Hanna MD  Primary Care Provider: Primary Doctor No        Subjective:     Principal Problem:Fungemia        HPI:  Lavelle Ladd is a 67 y.o. male patient with a PMHx of CAD, CHF, s/p kidney transplant  on 5/21/16, COPD, hyperlipidemia, hypertension, peripheral arterial disease, peripheral vascular disease, Application of large external fixation device to tibia (Left, 8/4/2022); Closed reduction of injury of tibia (Left, 8/4/2022); Removal of external fixation device (Left, 9/17/2022); Angiography of lower extremity (N/A, 9/23/2022); Angiography of lower extremity (N/A, 9/29/2022); and Below knee amputation of lower extremity (Left, 10/6/2022) who presents to the Emergency Department for RLE wound check. Pt reports a wound to his R BKA stump for the past week, but states that it has worsened since this morning.  Was evaluated by home health nurse, and has been recommended to go to ED due to likely findings of exposure of bone on examination.  Stated that Symptoms are constant and moderate in severity. No mitigating or exacerbating factors reported. Associated sxs include RLE pain and intermittent nausea. Pt also reports slurred speech since taking Tramadol for the first time this morning. Patient denies any fever, chills, vomiting, SOB, CP, weakness, numbness, dizziness, headache, and all other sxs at this time.   Stated improvement in diarrhea, stated having 2-3 episodes of formed stools, stated being compliant with medications/antibiotics at home.        Of note patient has been hospitalized from 04/05 to 4/13- with septic shock/  bacteremia , wound infection ,C,diff and osteo ; initially has been admitted to ICU due to requirement of pressors, has been downgraded to floor and subsequently got  discharged on 04/13 on 6 weeks of antibiotics including IV cefepime and p.o. vanc with end of treatment 05/25/2023.    Also patient has been started on p.o. vanc for findings of positive C diff on 04/06/2023 and has been recommended to take for total 10 days of duration with end of treatment 04/16/2023. ID has been consulted;   Status post PICC line placement on 04/11/2023.  Also during recent hospitalization for underlying renal transplant with DINORAH findings with creatinine up to 2.2 on admission, nephrology has been consulted, CellCept has been held due to underlying active infection, nephrology recommended Prograf 1 mg in a.m., 0.5 mg q.h.s. to maintain levels between 4-6.    On arrival to ED, patient remained afebrile, no tachycardia, no tachypnea, blood pressure soft, lactic acid 0.6, received 2 L of IV fluids, blood cultures x2 ordered and antibiotics were started within 4 hours duration.  Vascular surgery consulted;   For the findings of slurred speech on arrival, patient underwent CT head while in ED, showed no acute intracranial findings.      Pt has been   admitted multiple times since 8 /4/22  in multiples facility including  [facility], [facility]  and  [facility] .      Lives alone at home, stated that he gets support from his sister, ;  Code status:  Full code (alert and oriented x3)                      Overview/Hospital Course:  Lavelle Ladd is a 66 yo male, who was recently admitted with septic shock/  bacteremia , wound infection ,C diff colitis and osteomyelitis of R BKA stump s/p 6 weeks antimicrobial therapy. He presented to ED with worsening of R BKA wound with bone exposure and necrosis. Vanc, flagyl and cefepime initiated. Vascular surgery consulted with recommendation for right above the knee amputation. Patient states he would like 2nd opinion.   4/19/23 1 of 2 blood cultures collected on admit positive for yeast. Per ID start Micafungin. Repeat blood cultures in am. Wound  cultures pending. Start bicarb for management of metabolic acidosis per nephrology recommendation.   4/20 wound culture positive for pseudomonas. Continue antibiotic recs per ID. Patient requesting to speak with vascular service regarding amputation- as he has more questions before making final decision Dr. Mcdonald notified.       Interval History: no acute events overnight. Labs reviewed. VSS. Continue antibiotics per ID rec. DC PICC line. PT rec SNF    Review of Systems   Constitutional:  Negative for chills and fever.   HENT:  Negative for congestion.    Respiratory:  Negative for cough and shortness of breath.    Cardiovascular:  Negative for chest pain.   Gastrointestinal:  Negative for abdominal pain, diarrhea, nausea and vomiting.   Musculoskeletal:  Positive for arthralgias.   Skin:  Positive for wound.   Objective:     Vital Signs (Most Recent):  Temp: 98.3 °F (36.8 °C) (04/20/23 0810)  Pulse: 78 (04/20/23 0810)  Resp: 16 (04/20/23 1434)  BP: 126/70 (04/20/23 0810)  SpO2: 97 % (04/20/23 0810)   Vital Signs (24h Range):  Temp:  [97.9 °F (36.6 °C)-98.3 °F (36.8 °C)] 98.3 °F (36.8 °C)  Pulse:  [72-87] 78  Resp:  [12-18] 16  SpO2:  [96 %-98 %] 97 %  BP: (101-126)/(50-70) 126/70     Weight: 79.3 kg (174 lb 13.2 oz)  Body mass index is 24.38 kg/m².    Intake/Output Summary (Last 24 hours) at 4/20/2023 1812  Last data filed at 4/20/2023 0728  Gross per 24 hour   Intake 541.01 ml   Output 1550 ml   Net -1008.99 ml        Physical Exam  Vitals and nursing note reviewed.   Constitutional:       General: He is not in acute distress.     Appearance: He is well-developed.   HENT:      Head: Normocephalic and atraumatic.   Eyes:      Conjunctiva/sclera: Conjunctivae normal.      Pupils: Pupils are equal, round, and reactive to light.   Neck:      Vascular: No JVD.   Cardiovascular:      Rate and Rhythm: Normal rate and regular rhythm.      Heart sounds: Normal heart sounds.   Pulmonary:      Effort: Pulmonary effort is  normal. No respiratory distress.      Breath sounds: Normal breath sounds. No wheezing.   Abdominal:      General: Bowel sounds are normal. There is no distension.      Palpations: Abdomen is soft.      Tenderness: There is no abdominal tenderness. There is no guarding.   Musculoskeletal:         General: Tenderness (R BKA) present. Normal range of motion.      Cervical back: Normal range of motion and neck supple.   Skin:     General: Skin is warm and dry.      Capillary Refill: Capillary refill takes less than 2 seconds.      Findings: Erythema present.   Neurological:      Mental Status: He is alert and oriented to person, place, and time.   Psychiatric:         Behavior: Behavior normal.       Significant Labs: All pertinent labs within the past 24 hours have been reviewed.  CBC:   Recent Labs   Lab 04/19/23 0417 04/20/23  0446   WBC 2.12* 5.96   HGB 13.4* 8.4*   HCT 45.6 28.6*   * 317       CMP:   Recent Labs   Lab 04/19/23 0417 04/20/23  0446   * 132*   K 3.9 4.7   * 111*   CO2 15* 15*   * 129*   BUN 20 31*   CREATININE 1.7* 2.0*   CALCIUM 7.1* 7.7*   ANIONGAP 7* 6*             Significant Imaging: I have reviewed all pertinent imaging results/findings within the past 24 hours.      Assessment/Plan:      * Fungemia  -start Micafungin per ID recs   - follow repeat cultures       Metabolic acidosis  Bicarb of 16, anion gap 9, likely secondary to underlying kidney disease   Monitor and consider replacement as needed   Follow up with Nephrology    4/19  -resume po bicarb       Hyponatremia  Sodium 129  Baseline sodium runs between 129-133 likely secondary to underlying findings of cirrhosis as of ultrasound abdomen from 09/2022  Follow up on serum osmolality, urine osmolality, lytes, TSH, nephrology follow up     4/18  Na improved, 133   Nephrology following         Ulcer of right lower extremity with bone involvement without evidence of necrosis  Presented with right stump pain (8/10)  and discharge; (gradual worsening of lesion with findings of exposed bone)  MRI as of 4/9- Findings concerning for early osteomyelitis of the stump of the tibia about the wound margin.  Xray tibia/ fibula right- Below the knee amputation changes.  No acute fracture or suspicious osseous lesion.  Anatomical joint alignment.  No definite subcutaneous gas.  Soft tissue wound extending to the osseous margin of the distal tibia.  No definite associated erosive ostia changes.  S/p Application of large external fixation device to tibia (Left, 8/4/2022); Closed reduction of injury of tibia (Left, 8/4/2022); Removal of external fixation device (Left, 9/17/2022); Angiography of lower extremity (N/A, 9/23/2022); Angiography of lower extremity (N/A, 9/29/2022); and Below knee amputation of lower extremity (Left, 10/6/2022 ;    Recently discharged on 04/13 on IV cefepime, p.o. Flagyl for 6 weeks' course, with end of treatment 5/25; status post PICC line placed on 04/11 4/20   wound culture positive for pseudomonas, final culture pending   ID following     4/18  - Vascular following- rec R AKA  - continue antibiotics for now     Afebrile, no leukocytosis on arrival, no tachycardia, no tachypnea, blood pressure is soft, lactic acid 0.6; received 2 L of IV fluids, antibiotics, blood cultures, vascular consulted; wound care follow up  To continue cefepime, Flagyl, follow up with vascular, NPO except for medications since midnight for any anticipated vascular intervention     4/19  Vascular recommends right AKA, patient requesting 2nd opinion   -continue vanc, cefepime and flagyl   -follow wound cultures                   Renal transplant, status post  History of renal transplant in 2016   Creatinine 1.9, (creatinine 1.9 at the time of discharge as of 4/13)  Avoid nephrotoxins,  Renal dose of medications   CellCept held due to underlying infection, ordered Prograf 1 mg q.a.m., 0.5 mg q.h.s. as recommended by Nephrology during most  recent admission   Prograf level in a.m.   Nephrology consult    4/18  Cr 1.7         Chronic obstructive pulmonary disease  Currently not in acute exacerbation   Nebs p.r.n.      Hyperlipidemia   Resume home dose      Type 2 diabetes mellitus with hyperglycemia, with long-term current use of insulin  Patient's FSGs are on current medication regimen.  Last A1c reviewed-   Lab Results   Component Value Date    HGBA1C 6.7 (H) 04/05/2023     Most recent fingerstick glucose reviewed-   Recent Labs   Lab 04/17/23  2056 04/18/23  0429 04/18/23  1145   POCTGLUCOSE 188* 118* 106     Current correctional scale   anti-hyperglycemic dose as follows-   Antihyperglycemics (From admission, onward)      Start     Stop Route Frequency Ordered    04/18/23 2100  insulin detemir U-100 (Levemir) pen 12 Units         -- SubQ 2 times daily 04/17/23 1643    04/17/23 1749  insulin aspart U-100 pen 0-5 Units         -- SubQ Before meals & nightly PRN 04/17/23 1649          Hold Oral hypoglycemics while patient is in the hospital.    Glucose POC, sliding scale insulin, hypoglycemia protocol   At home- on Levemir 12 units b.i.d., sliding scale insulin   Ordered 12 units tonight, hold morning dose for NPO status for any anticipated vascular interventions, ;     Essential hypertension  Blood pressure 90s over 50s at the time of admission, hold home medications including amlodipine, Coreg   Monitor and resume medications accordingly        VTE Risk Mitigation (From admission, onward)           Ordered     IP VTE HIGH RISK PATIENT  Once         04/17/23 1649     Place sequential compression device  Until discontinued         04/17/23 1649                    Discharge Planning   LISETH:  4/24/23    Code Status: Full Code   Is the patient medically ready for discharge?:     Reason for patient still in hospital (select all that apply): Patient trending condition, Treatment, Consult recommendations and Pending disposition  Discharge Plan A: Home Health    Discharge Delays: None known at this time              Sherita Christensen NP  Department of Hospital Medicine   '[facility]

## 2023-04-20 NOTE — ASSESSMENT & PLAN NOTE
66 y/o male with a h/o of KTx presented with ulcer at leg stump:                  Kidney transplant recipient     s Cr has remained stable, but slightly above baseline  CKD stage 3 at baseline  K normal  Metabolic acidosis, worse again after stopping po bicarbonate, will re-start  Hyponatremia, stable, improved, advised pt to keep water intake moderate     H/o of cadaveric kidney transplant in May 2016  On immunosuppressive therapy  Prograf level within the therapeutic range  Cellcept on hold due to active infection     HTN : BP controlled  Meds reviewed     H/o of DM  Diabetic nephropathy     Med review:  Metabolic acidosis has improved. PO bicarbonate was stopped               * right ankle wound and infection     Wound cx shows Pseudomonas  Treatment and management reviewedd  Agree with current mgmt            Plans and recommendations:  As discussed above  Total time spent 40 minutes including time needed to review the records, the   patient evaluation, documentation, face-to-face discussion with the patient,   more than 50% of the time was spent on coordination of care and counseling.

## 2023-04-20 NOTE — PT/OT/SLP PROGRESS
Physical Therapy Treatment    Patient Name:  Lavelle Ladd   MRN:  [id number]    Recommendations:     Discharge Recommendations: nursing facility, skilled  Discharge Equipment Recommendations: none  Barriers to discharge: None    Assessment:     Lavelle Ladd is a 67 y.o. male admitted with a medical diagnosis of Ulcer of right lower extremity with bone involvement without evidence of necrosis.  He presents with the following impairments/functional limitations: weakness, gait instability, impaired endurance, impaired balance, pain, impaired functional mobility .    Rehab Prognosis: Good; patient would benefit from acute skilled PT services to address these deficits and reach maximum level of function.    Recent Surgery: * No surgery found *      Plan:     During this hospitalization, patient to be seen 3 x/week to address the identified rehab impairments via gait training, therapeutic activities, wheelchair management/training and progress toward the following goals:    Plan of Care Expires:  05/02/23    Subjective     Chief Complaint: wants to try to transfer and stand today  Patient/Family Comments/goals: having trouble getting prosthesis on  Pain/Comfort:  Pain Rating 1: 4/10 (low back; change in postures reduced it)      Objective:     Communicated with nurse Rivas prior to session.  Patient found sitting edge of bed with peripheral IV, telemetry upon PT entry to room.     General Precautions: Standard, fall, contact  Orthopedic Precautions: N/A  Braces: N/A  Respiratory Status: Room air     Functional Mobility:  Bed Mobility:     Supine to Sit: supervision  Sit to Supine: supervision  Transfers:     Sit to Stand:  attempted but even with multiple attempts he was unable to sudhir his prosthesis. PT assisted and encourage using his  so we can accomplish on the next day.      AM-PAC 6 CLICK MOBILITY  Turning over in bed (including adjusting bedclothes, sheets and blankets)?: 4  Sitting down on and  standing up from a chair with arms (e.g., wheelchair, bedside commode, etc.): 1  Moving from lying on back to sitting on the side of the bed?: 4  Moving to and from a bed to a chair (including a wheelchair)?: 1  Need to walk in hospital room?: 1  Climbing 3-5 steps with a railing?: 1  Basic Mobility Total Score: 12       Treatment & Education:  PT worked with pt on seated LAQ, supine QS, seated at EOB attempts in getting prosthesis on. PT noted the edema is limiting the residual limb from getting deep enough to allow the stem to engage in the prosthesis. PT attempted multiple times with pt. Pt declined scooting to chair as he is only feeling safe with prosthesis is donned.     Patient left supine with all lines intact and bed alarm on with nurse and OT aware.    GOALS:   Multidisciplinary Problems       Physical Therapy Goals          Problem: Physical Therapy    Goal Priority Disciplines Outcome Goal Variances Interventions   Physical Therapy Goal     PT, PT/OT Ongoing, Progressing     Description: LT23  1. PT WILL COMPLETE BED MOBILITY IND  2. PT WILL SQUAD PIVOT T/F TO CHAIR WITH MIN A  3. PT WILL COMPLETE B LE TE X 20 REPS FOR STRENGTHENING.                        Time Tracking:     PT Received On: 23  PT Start Time: 930     PT Stop Time: 953  PT Total Time (min): 23 min     Billable Minutes: Therapeutic Activity 23    Treatment Type: Treatment  PT/PTA: PT     Number of PTA visits since last PT visit: 0     2023

## 2023-04-20 NOTE — SUBJECTIVE & OBJECTIVE
Interval History:   67  year old man with PMHx of CAD, CHF, s/p kidney transplant  on 5/21/16, COPD, hyperlipidemia, hypertension, peripheral arterial disease, peripheral vascular disease, below knee amputation of lower extremity admitted with worsening right lower extremity wound.  He was recently seen earlier this wound and was discharged to complete 6 weeks of Cefepime/flagyl.  Labs and imaging test reviewed-            Specimen: Wound from Leg, Right Updated: 04/10/23 1038         Aerobic Bacterial Culture PSEUDOMONAS AERUGINOSA   Moderate    Abnormal        PROVIDENCIA STUARTII   Moderate    04/19-  He feels better-  Blood culture -yeast    Review of Systems   Constitutional:  Negative for activity change, appetite change, chills and diaphoresis.   Respiratory:  Negative for apnea and chest tightness.    Neurological:  Negative for dizziness and facial asymmetry.   Objective:     Vital Signs (Most Recent):  Temp: 97.9 °F (36.6 °C) (04/20/23 0014)  Pulse: 87 (04/20/23 0110)  Resp: 18 (04/20/23 0202)  BP: (!) 101/50 (04/20/23 0014)  SpO2: 96 % (04/20/23 0014)   Vital Signs (24h Range):  Temp:  [97.2 °F (36.2 °C)-98.1 °F (36.7 °C)] 97.9 °F (36.6 °C)  Pulse:  [68-87] 87  Resp:  [14-18] 18  SpO2:  [93 %-98 %] 96 %  BP: (101-123)/(50-70) 101/50     Weight: 79.3 kg (174 lb 13.2 oz)  Body mass index is 24.38 kg/m².    Estimated Creatinine Clearance: 43.7 mL/min (A) (based on SCr of 1.7 mg/dL (H)).    Physical Exam  Vitals and nursing note reviewed.   Constitutional:       Appearance: He is normal weight.   HENT:      Head: Normocephalic.   Cardiovascular:      Rate and Rhythm: Tachycardia present.   Musculoskeletal:         General: Normal range of motion.      Cervical back: Normal range of motion.      Comments: Dressing noted   Neurological:      Mental Status: He is alert.       Significant Labs: Blood Culture:   Recent Labs   Lab 04/05/23  1757 04/05/23  1945 04/17/23  1347   LABBLOO Gram stain susan bottle: Gram  negative rods, Gram positive cocci  Results called to and read back by: Ashley Nails RN 04/06/2023  14:48  Gram stain aer bottle: Gram positive cocci and Gram negative rods  Positive results previously called 04/06/2023  STREPTOCOCCUS AGALACTIAE (GROUP B)  Beta-hemolytic streptococci are routinely susceptible to   penicillins,cephalosporins and carbapenems.  *  PROTEUS MIRABILIS* Gram stain susan bottle: Gram negative rods  Results called to and read back by: Ashley Nails RN 04/06/2023 14:48  Gram stain aer bottle: Gram negative rods  Positive results previously called 04/07/2023  20:26  PROTEUS MIRABILIS  For susceptibility see order #[id number]  * Gram stain susan bottle: yeast  Results called to and read back by:Sebastien Rowley LPN 04/19/2023  09:50  No Growth to date  No Growth to date     BMP:   Recent Labs   Lab 04/19/23 0417   *   *   K 3.9   *   CO2 15*   BUN 20   CREATININE 1.7*   CALCIUM 7.1*     CBC:   Recent Labs   Lab 04/18/23 0523 04/19/23 0417   WBC 5.82 2.12*   HGB 8.6* 13.4*   HCT 28.6* 45.6    134*     CMP:   Recent Labs   Lab 04/18/23 0523 04/19/23 0417   * 135*   K 4.4 3.9    113*   CO2 17* 15*   GLU 94 168*   BUN 21 20   CREATININE 1.7* 1.7*   CALCIUM 8.7 7.1*   ANIONGAP 7* 7*     Wound Culture:   Recent Labs   Lab 04/06/23  1707   LABAERO PSEUDOMONAS AERUGINOSA  Moderate  *  PROVIDENCIA STUARTII  Moderate  Skin skylar also present  *     All pertinent labs within the past 24 hours have been reviewed.    Significant Imaging: I have reviewed all pertinent imaging results/findings within the past 24 hours.

## 2023-04-20 NOTE — SUBJECTIVE & OBJECTIVE
Interval History: Pt was seen and examined. Labs and meds reviewed. Discussed with other providers. No new events, no overall change, no SOB, no new c/o's.    Review of patient's allergies indicates:  No Known Allergies  Current Facility-Administered Medications   Medication Frequency    acetaminophen tablet 500 mg Q6H PRN    acetaminophen tablet 500 mg Q8H PRN    albuterol-ipratropium 2.5 mg-0.5 mg/3 mL nebulizer solution 3 mL Q6H PRN    atorvastatin tablet 80 mg Daily    ceFEPIme (MAXIPIME) 2 g in dextrose 5 % in water (D5W) 5 % 50 mL IVPB (MB+) Q12H    cholecalciferol (vitamin D3) 125 mcg (5,000 unit) tablet 5,000 Units Every Mon    dextrose 10% bolus 125 mL 125 mL PRN    dextrose 10% bolus 250 mL 250 mL PRN    dextrose 40 % gel 15,000 mg PRN    dextrose 40 % gel 30,000 mg PRN    diphenhydrAMINE capsule 25 mg Q6H PRN    ferrous sulfate tablet 1 each BID    gabapentin capsule 300 mg TID    glucagon (human recombinant) injection 1 mg PRN    HYDROcodone-acetaminophen  mg per tablet 1 tablet Q6H PRN    insulin aspart U-100 pen 0-5 Units QID (AC + HS) PRN    insulin detemir U-100 (Levemir) pen 12 Units BID    levothyroxine tablet 125 mcg Daily    melatonin tablet 6 mg Nightly PRN    metroNIDAZOLE tablet 500 mg Q8H    micafungin 100 mg in sodium chloride 0.9 % 100 mL IVPB (MB+) Q24H    morphine injection 4 mg Q6H PRN    multivitamin tablet Daily    naloxone 0.4 mg/mL injection 0.02 mg PRN    ondansetron disintegrating tablet 4 mg Q8H PRN    sodium chloride 0.9% flush 10 mL Q12H PRN    tacrolimus capsule 0.5 mg Daily PM    tacrolimus capsule 1 mg Daily AM    vancomycin - pharmacy to dose pharmacy to manage frequency       Objective:     Vital Signs (Most Recent):  Temp: 98.3 °F (36.8 °C) (04/20/23 0810)  Pulse: 78 (04/20/23 0810)  Resp: 12 (04/20/23 0810)  BP: 126/70 (04/20/23 0810)  SpO2: 97 % (04/20/23 0810) Vital Signs (24h Range):  Temp:  [97.2 °F (36.2 °C)-98.3 °F (36.8 °C)] 98.3 °F (36.8 °C)  Pulse:  [68-87]  78  Resp:  [12-18] 12  SpO2:  [93 %-98 %] 97 %  BP: (101-126)/(50-70) 126/70     Weight: 79.3 kg (174 lb 13.2 oz) (04/20/23 0014)  Body mass index is 24.38 kg/m².  Body surface area is 1.99 meters squared.    I/O last 3 completed shifts:  In: 641.5 [P.O.:400; IV Piggyback:241.5]  Out: 2850 [Urine:2850]    Physical Exam  Vitals and nursing note reviewed.   Constitutional:       Appearance: Normal appearance.   Cardiovascular:      Rate and Rhythm: Normal rate and regular rhythm.      Pulses: Normal pulses.      Heart sounds: Normal heart sounds.   Pulmonary:      Effort: Pulmonary effort is normal.      Breath sounds: Normal breath sounds.   Abdominal:      General: Abdomen is flat.      Tenderness: There is no abdominal tenderness.   Musculoskeletal:      Right lower leg: No edema.      Left lower leg: No edema.   Neurological:      Mental Status: He is alert and oriented to person, place, and time.   Psychiatric:         Behavior: Behavior normal.       Significant Labs:  reviewed  BMP  Lab Results   Component Value Date     (L) 04/20/2023    K 4.7 04/20/2023     (H) 04/20/2023    CO2 15 (L) 04/20/2023    BUN 31 (H) 04/20/2023    CREATININE 2.0 (H) 04/20/2023    CALCIUM 7.7 (L) 04/20/2023    ANIONGAP 6 (L) 04/20/2023    EGFRNORACEVR 35 (A) 04/20/2023     Lab Results   Component Value Date    WBC 5.96 04/20/2023    HGB 8.4 (L) 04/20/2023    HCT 28.6 (L) 04/20/2023    MCV 84 04/20/2023     04/20/2023     Prograf level 6.2
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
## 2023-04-20 NOTE — PT/OT/SLP PROGRESS
Occupational Therapy  Treatment    Lavelle Ladd   MRN: [id number]   Admitting Diagnosis: Ulcer of right lower extremity with bone involvement without evidence of necrosis    OT Date of Treatment: 04/20/23   OT Start Time: 1110  OT Stop Time: 1150  OT Total Time (min): 40 min    Billable Minutes:  Therapeutic Activity 40 minutes    OT/MIGUEL: OT          General Precautions: Standard, fall  Orthopedic Precautions: N/A  Braces: N/A  Respiratory Status: Room air         Subjective:  Communicated with nurse and epic chart review prior to session.    Pain/Comfort  Pain Rating 1: 0/10    Objective:  Patient found with: telemetry, peripheral IV     Functional Mobility:  Bed Mobility:  Sba with rolling l<>r  Sba with seated scooted   Sba supine<>sit  Pt sat eob 25 min with sba unsupported as well as using bed rail    Transfers:   Max a with sit<>stand transfers using bed rails    Functional Ambulation:   Activities of Daily Living:       LE (s) for retrieval of l le prothesis per pt report      Balance:   Static Sit: FAIR+: Able to take MINIMAL challenges from all directions  Dynamic Sit: FAIR+: Maintains balance through MINIMAL excursions of active trunk motion  Static Stand: 0: Needs MAXIMAL assist to maintain       Therapeutic Activities and Exercises:  Pt educated on HEP. PT VERBALIZED UNDERSTANDING AND RETURNED DEMONSTRATION  AM-PAC 6 CLICK ADL   How much help from another person does this patient currently need?   1 = Unable, Total/Dependent Assistance  2 = A lot, Maximum/Moderate Assistance  3 = A little, Minimum/Contact Guard/Supervision  4 = None, Modified [place]/Independent    Putting on and taking off regular lower body clothing? : 3  Bathing (including washing, rinsing, drying)?: 3  Toileting, which includes using toilet, bedpan, or urinal? : 3  Putting on and taking off regular upper body clothing?: 3  Taking care of personal grooming such as brushing teeth?: 4  Eating meals?: 4  Daily Activity Total Score:  "20     AM-PAC Raw Score CMS "G-Code Modifier Level of Impairment Assistance   6 % Total / Unable   7 - 8 CM 80 - 100% Maximal Assist   9-13 CL 60 - 80% Moderate Assist   14 - 19 CK 40 - 60% Moderate Assist   20 - 22 CJ 20 - 40% Minimal Assist   23 CI 1-20% SBA / CGA   24 CH 0% Independent/ Mod I       Patient left HOB elevated with all lines intact, call button in reach, bed alarm on, NURSE notified, and NURSE present    ASSESSMENT:  Lavelle Ladd is a 67 y.o. male with a medical diagnosis of Ulcer of right lower extremity with bone involvement without evidence of necrosis and presents with DEBILITY  AND GENERALIZED WEAKNESS    Rehab identified problem list/impairments:  weakness, impaired balance, decreased safety awareness, impaired endurance, impaired cognition, impaired self care skills, decreased ROM, impaired functional mobility    Rehab potential is good.    Activity tolerance: Good    Discharge recommendations: nursing facility, skilled   Barriers to discharge:      Equipment recommendations: none    GOALS:   Multidisciplinary Problems       Occupational Therapy Goals          Problem: Occupational Therapy    Goal Priority Disciplines Outcome Interventions   Occupational Therapy Goal     OT, PT/OT Ongoing, Progressing    Description: Goals to be met by: 5/2/23     Patient will increase functional independence with ADLs by performing:    Toileting from bedside commode with Supervision for hygiene and clothing management.   Stand pivot transfers with Modified [place] using LLE prosthetic and RW.  Upper extremity exercise program x20 reps per handout, with independence to maintain BUE strength.                         Plan:  Patient to be seen 2 x/week to address the above listed problems via self-care/home management, therapeutic activities, therapeutic exercises  Plan of Care expires: 05/02/23  Plan of Care reviewed with: patient         04/20/2023  "

## 2023-04-20 NOTE — PLAN OF CARE
VS stable. Purposeful rounding. Orders acknowledged. Discussed poc with pt, pt verbalized understanding  Purposeful rounding every 2hours  VS stable   Cardiac monitoring in use  Blood glucose monitoring   Fall precautions in place, remains injury free  Pain and nausea under control with PRN meds  IV antibiotics   Accurate I&Os  Bed locked at lowest position  Call light within reach  Orders acknowledged  Chart check complete  Will cont with POC

## 2023-04-20 NOTE — PROGRESS NOTES
[facility] - Med Surg  Infectious Disease  Progress Note    Patient Name: Lavelle Ladd  MRN: [id number]  Admission Date: 4/17/2023  Length of Stay: 0 days  Attending Physician: Steve Hanna MD  Primary Care Provider: Primary Doctor No    Isolation Status: Special Contact  Assessment/Plan:      Pulmonary  Chronic obstructive pulmonary disease  duonebs as tolerated     Renal/  Renal transplant, status post  Follow transplant team    ID  Fungemia  Will start Micafungin-will need to remove PICC line   Will follow ID of org, will need eye exam to rule out endophthalmitis at some point      Endocrine  Type 2 diabetes mellitus with hyperglycemia, with long-term current use of insulin  Insulin regime as per primary team        Anticipated Disposition:     Thank you for your consult. I will follow-up with patient. Please contact us if you have any additional questions.    Ronny Veliz MD, [facility]  Infectious Disease  [facility] - Med Surg    Subjective:     Principal Problem:Ulcer of right lower extremity with bone involvement without evidence of necrosis    HPI:   67  year old man with PMHx of CAD, CHF, s/p kidney transplant  on 5/21/16, COPD, hyperlipidemia, hypertension, peripheral arterial disease, peripheral vascular disease, below knee amputation of lower extremity admitted with worsening right lower extremity wound.  He was recently seen earlier this wound and was discharged to complete 6 weeks of Cefepime/flagyl.  Labs and imaging test reviewed-    Specimen: Wound from Leg, Right Updated: 04/10/23 1038      Aerobic Bacterial Culture PSEUDOMONAS AERUGINOSA   Moderate    Abnormal      PROVIDENCIA STUARTII   Moderate   Skin skylar also present    Blood culture- 04/05-strep/proteus   Component      Latest Ref Rng & Units 4/18/2023 4/17/2023 4/17/2023            1:46 PM 10:45 AM   WBC      3.90 - 12.70 K/uL 5.82 9.12 10.66     Interval History:   67  year old man with PMHx of CAD, CHF, s/p kidney transplant  on 5/21/16,  COPD, hyperlipidemia, hypertension, peripheral arterial disease, peripheral vascular disease, below knee amputation of lower extremity admitted with worsening right lower extremity wound.  He was recently seen earlier this wound and was discharged to complete 6 weeks of Cefepime/flagyl.  Labs and imaging test reviewed-            Specimen: Wound from Leg, Right Updated: 04/10/23 1038         Aerobic Bacterial Culture PSEUDOMONAS AERUGINOSA   Moderate    Abnormal        PROVIDENCIA STUARTII   Moderate    04/19-  He feels better-  Blood culture -yeast    Review of Systems   Constitutional:  Negative for activity change, appetite change, chills and diaphoresis.   Respiratory:  Negative for apnea and chest tightness.    Neurological:  Negative for dizziness and facial asymmetry.   Objective:     Vital Signs (Most Recent):  Temp: 97.9 °F (36.6 °C) (04/20/23 0014)  Pulse: 87 (04/20/23 0110)  Resp: 18 (04/20/23 0202)  BP: (!) 101/50 (04/20/23 0014)  SpO2: 96 % (04/20/23 0014)   Vital Signs (24h Range):  Temp:  [97.2 °F (36.2 °C)-98.1 °F (36.7 °C)] 97.9 °F (36.6 °C)  Pulse:  [68-87] 87  Resp:  [14-18] 18  SpO2:  [93 %-98 %] 96 %  BP: (101-123)/(50-70) 101/50     Weight: 79.3 kg (174 lb 13.2 oz)  Body mass index is 24.38 kg/m².    Estimated Creatinine Clearance: 43.7 mL/min (A) (based on SCr of 1.7 mg/dL (H)).    Physical Exam  Vitals and nursing note reviewed.   Constitutional:       Appearance: He is normal weight.   HENT:      Head: Normocephalic.   Cardiovascular:      Rate and Rhythm: Tachycardia present.   Musculoskeletal:         General: Normal range of motion.      Cervical back: Normal range of motion.      Comments: Dressing noted   Neurological:      Mental Status: He is alert.       Significant Labs: Blood Culture:   Recent Labs   Lab 04/05/23 1757 04/05/23  1945 04/17/23  1347   LABBLOO Gram stain susan bottle: Gram negative rods, Gram positive cocci  Results called to and read back by: Ashley Nails RN  04/06/2023  14:48  Gram stain aer bottle: Gram positive cocci and Gram negative rods  Positive results previously called 04/06/2023  STREPTOCOCCUS AGALACTIAE (GROUP B)  Beta-hemolytic streptococci are routinely susceptible to   penicillins,cephalosporins and carbapenems.  *  PROTEUS MIRABILIS* Gram stain susan bottle: Gram negative rods  Results called to and read back by: Ashley Nails RN 04/06/2023 14:48  Gram stain aer bottle: Gram negative rods  Positive results previously called 04/07/2023  20:26  PROTEUS MIRABILIS  For susceptibility see order #[id number]  * Gram stain susan bottle: yeast  Results called to and read back by:Sebastien Rowley LPN 04/19/2023  09:50  No Growth to date  No Growth to date     BMP:   Recent Labs   Lab 04/19/23  0417   *   *   K 3.9   *   CO2 15*   BUN 20   CREATININE 1.7*   CALCIUM 7.1*     CBC:   Recent Labs   Lab 04/18/23  0523 04/19/23  0417   WBC 5.82 2.12*   HGB 8.6* 13.4*   HCT 28.6* 45.6    134*     CMP:   Recent Labs   Lab 04/18/23  0523 04/19/23  0417   * 135*   K 4.4 3.9    113*   CO2 17* 15*   GLU 94 168*   BUN 21 20   CREATININE 1.7* 1.7*   CALCIUM 8.7 7.1*   ANIONGAP 7* 7*     Wound Culture:   Recent Labs   Lab 04/06/23  1707   LABAERO PSEUDOMONAS AERUGINOSA  Moderate  *  PROVIDENCIA STUARTII  Moderate  Skin skylar also present  *     All pertinent labs within the past 24 hours have been reviewed.    Significant Imaging: I have reviewed all pertinent imaging results/findings within the past 24 hours.

## 2023-04-20 NOTE — ASSESSMENT & PLAN NOTE
Presented with right stump pain (8/10) and discharge; (gradual worsening of lesion with findings of exposed bone)  MRI as of 4/9- Findings concerning for early osteomyelitis of the stump of the tibia about the wound margin.  Xray tibia/ fibula right- Below the knee amputation changes.  No acute fracture or suspicious osseous lesion.  Anatomical joint alignment.  No definite subcutaneous gas.  Soft tissue wound extending to the osseous margin of the distal tibia.  No definite associated erosive ostia changes.  S/p Application of large external fixation device to tibia (Left, 8/4/2022); Closed reduction of injury of tibia (Left, 8/4/2022); Removal of external fixation device (Left, 9/17/2022); Angiography of lower extremity (N/A, 9/23/2022); Angiography of lower extremity (N/A, 9/29/2022); and Below knee amputation of lower extremity (Left, 10/6/2022 ;    Recently discharged on 04/13 on IV cefepime, p.o. Flagyl for 6 weeks' course, with end of treatment 5/25; status post PICC line placed on 04/11 4/20   wound culture positive for pseudomonas, final culture pending   ID following     4/18  - Vascular following- rec R AKA  - continue antibiotics for now     Afebrile, no leukocytosis on arrival, no tachycardia, no tachypnea, blood pressure is soft, lactic acid 0.6; received 2 L of IV fluids, antibiotics, blood cultures, vascular consulted; wound care follow up  To continue cefepime, Flagyl, follow up with vascular, NPO except for medications since midnight for any anticipated vascular intervention     4/19  Vascular recommends right AKA, patient requesting 2nd opinion   -continue vanc, cefepime and flagyl   -follow wound cultures

## 2023-04-20 NOTE — PLAN OF CARE
04/20/23 1132   Discharge Reassessment   Assessment Type Discharge Planning Reassessment   Did the patient's condition or plan change since previous assessment? No   Discharge Plan discussed with:   (Attending MD, Dr. Hanna)   Communicated LISETH with patient/caregiver Date not available/Unable to determine   Discharge Plan A Home Health   DME Needed Upon Discharge  none   Discharge Barriers Identified None   Post-Acute Status   Discharge Delays None known at this time

## 2023-04-20 NOTE — ASSESSMENT & PLAN NOTE
Will start Micafungin-will need to remove PICC line   Will follow ID of org, will need eye exam to rule out endophthalmitis at some point

## 2023-04-20 NOTE — UM SECONDARY REVIEW
Physician Advisor External    Level of Care Issue    Approved Inpatient  [facility]  [employer] Medical Necessity Recommendation  The information in this document is a recommendation to be used for utilization review and utilization management purposes only. This recommendation is not an order. The recommendation is made  based on the information reviewed by On license of [facility] at the time of the referral, is pursuant to the Medicare Hospital Conditions of Participation (42 CFR Part 482), and is neither a judgment  nor an assessment with regard to the appropriateness or quality of clinical care. Nothing in this document may be used to limit, alter, or affect clinical services provided to the above named patient. The  provider of services is ultimately responsible for the submission of a claim that has met all requirements for correct coding, billing, and reimbursement.  If this patients primary payor is Medicare and the patients initial physician order was for inpatient, Condition Code 44 may be appropriate if all requirements for Condition Code 44 are met. For outpatient  recommendations, observation services may have been appropriate at the time of presentation.  For readmissions within 30 days of discharge from the same hospital, we recommend the hospital refer to its internal utilization review, quality, and/or peer review processes in accordance with its internal  protocols as this recommendation does not constitute a finding regarding the quality of care provided.  Part of the Permanent Record  Page 1 of 2 Name: Mile Kitchen  Acct #: [id number]  Patient Name: Mile Kitchen  Account Number: [id number]  Age/Gender: 48 / Female Admit Date: 04/18/2023  Attending Physician: Bryan Bhandari Date Received: 04/20/2023  [employer] Physician Advisor: Cecilia Basurto MD  04/20/2023 3:21PM  [phone]  The order for [employer] Review: Observation  This recommendation is based upon review  of the clinical information provided to [facility] as of  the date of this recommendation.  Recommendation Summary  ? 04/18/2023 - 04/19/2023: Observation  ? 04/20/2023: Inpatient  Recommendation for 04/18/2023 - 04/19/2023  Supporting Clinical Factors:  ? Elevated troponin with no or low physician concern for acute myocardial infarction  ? Elevated troponin that is stable  ? Chest pain or anginal-equivalent symptoms and plan of care including continuous cardiac monitoring and serial  cardiac enzymes with:  ? Non-invasive diagnostic testing or imaging, such as echocardiography, coronary CT angiogram (CCTA), or  cardiac stress testing (may be pending or completed)  ? Chest pain or anginal-equivalent symptoms with:  ? No diagnosis of myocardial infarction  ? Negative or stable troponin levels and no EKG changes  ? Acute pain requiring administration of analgesia with:  ? Analgesia ordered as needed (PRN)  The Attending Physician Concern:  The concerns of the attending physician are for chest pain and elevated troponin.  Rationale:  Observation services are appropriate due to the need to evaluate and treat this patient who presented with left-sided  chest pain radiating to left arm in the setting of hemodynamic stability except for /80, normal breath sounds,  downtrending troponin 0.094, 0.088 and 0.082 ( 0-0.026), BNP 23 (0-99), sodium 135 to 134, potassium 3.4, chest  xray unremarkable, EKG negative for ST changes, Echocardiogram showed normal EF (65%) and no urgent surgical  intervention needing further evaluation and treatment.  Plan of Care Includes:  The plan of care includes trend troponin, Cardiology consult, MPI stress test, Continue CCB, ACEi, statin, PRN nitro  for chest pain and NPO since midnight.  [facility] [employer] [facility] Necessity Recommendation  The information in this document is a recommendation to be used for utilization review and utilization  management purposes only. This recommendation is not an order. The recommendation is made  based on the information reviewed by On license of [facility] at the time of the referral, is pursuant to the Medicare Hospital Conditions of Participation (42 CFR Part 482), and is neither a judgment  nor an assessment with regard to the appropriateness or quality of clinical care. Nothing in this document may be used to limit, alter, or affect clinical services provided to the above named patient. The  provider of services is ultimately responsible for the submission of a claim that has met all requirements for correct coding, billing, and reimbursement.  If this patients primary payor is Medicare and the patients initial physician order was for inpatient, Condition Code 44 may be appropriate if all requirements for Condition Code 44 are met. For outpatient  recommendations, observation services may have been appropriate at the time of presentation.  For readmissions within 30 days of discharge from the same hospital, we recommend the hospital refer to its internal utilization review, quality, and/or peer review processes in accordance with its internal  protocols as this recommendation does not constitute a finding regarding the quality of care provided.  Part of the Permanent Record  Page 2 of 2 Name: Mile Rajput Imtiaz  [facility] [id number]  Recommendation for 04/20/2023  The Attending Physician Concern:  The concern of the attending physician is for chest pain.  Rationale:  Inpatient services are appropriate due to the need to evaluate and treat this patient in the setting of stress shows  possible ischemia and plan of cardiac catheterization placing the patient at increasing the risk for systemic  complications and hemodynamic decompensation.  Plan of Care Includes:  The plan of care heart catheterization tentatively scheduled for the AM/  References:  1. Elliot Miller. High-risk chief complaints I: Chest  pain - The big three (an update). Emergency Medicine  [facility] [place]. 2020;38(2):453-498. doi:10.1016/j.emc.2020.01.009  2. Mar Slaughter. Observation medicine and clinical decision units. In: Saqib R, Josafat R, Janak Carmichael M,  eds. Cano's Emergency Medicine: Clinical Concepts and Clinical Practice. 9th ed. Elsevier; 2018:e67-e69.  3. Davis DG, Joaquin J, Alex MV, et al. Prospective validation and comparative analysis of coronary risk stratification  strategies among emergency department patients with chest pain. J Am Heart Assoc. 2021;10(7):x830737.  doi:10.1161/JAHA.120.579958  4. Collet KELSY, Odilia H, Kelsie E, et al. 2020 ESC Guidelines for the Management of Acute Coronary Syndromes in  Patients Presenting Without Persistent ST-Segment Elevation: The Task Force for the Management of Acute  Coronary Syndromes in Patients Presenting Without Persistent ST-Segment Elevation of the European Society of  Cardiology (ESC).  Heart Journal. 2021;42(14):9166-4674. doi:10.1093/eurheartj/gsda957  5. Collinson P. Troponin in suspected coronary artery disease. J Am Tr Cardiol. 2019;73(3):261-263.  doi:10.1016/j.jacc.2018.11.020.  6. Hilario M, Sandoval PD, Saul D, et al. 2021 AHA/ACC/ASE/CHEST/SAEM/SCCT/SCMR Guideline for the  Evaluation and Diagnosis of Chest Pain: Executive Summary. Journal of the American College of Cardiology.  2021;78(22):6112-0187. doi:10.1016/j.jacc.2021.07.052  7. Collinson P. Troponin in suspected coronary artery disease. J Am Tr Cardiol. 2019;73(3):261-263.  doi:10.1016/j.jacc.2018.11.020.  8. Iris S, Rhett R, Reggie BD, et al. The treatment of acute pain in the emergency department: A white paper  position statement prepared for the American Academy of Emergency Medicine. The Journal of Emergency  Medicine. 2018;54(5):731-736. doi:10.1016/j.jemermed.2018.01.020.  9. Milind HAGER, Dion AYALA, Ino COLON, Clarence MONTIEL, Yobany CALI. Analysis of the 4th Universal  Definition of  Myocardial Infarction-Key concepts and perioperative implications. J Cardiothorac Vasc Anesth. 2019.  doi:10.1053/j.jvca.2019.01.030.

## 2023-04-20 NOTE — PLAN OF CARE
Patient remains free of hospital injury. VSS. Continuing IV antibiotics, wound care, pain control, and glucose monitoring. Cardiac monitoring as ordered. Call light in reach, bed alarm on. Chart review complete.    Problem: Diabetes Comorbidity  Goal: Blood Glucose Level Within Targeted Range  Outcome: Ongoing, Progressing     Problem: Infection (Pneumonia)  Goal: Resolution of Infection Signs and Symptoms  Outcome: Ongoing, Progressing     Problem: Infection  Goal: Absence of Infection Signs and Symptoms  Outcome: Ongoing, Progressing     Problem: Impaired Wound Healing  Goal: Optimal Wound Healing  Outcome: Ongoing, Progressing     Problem: Skin Injury Risk Increased  Goal: Skin Health and Integrity  Outcome: Ongoing, Progressing

## 2023-04-20 NOTE — SUBJECTIVE & OBJECTIVE
Interval History: no acute events overnight. Labs reviewed. VSS. Continue antibiotics per ID rec. DC PICC line.     Review of Systems   Constitutional:  Negative for chills and fever.   HENT:  Negative for congestion.    Respiratory:  Negative for cough and shortness of breath.    Cardiovascular:  Negative for chest pain.   Gastrointestinal:  Negative for abdominal pain, diarrhea, nausea and vomiting.   Musculoskeletal:  Positive for arthralgias.   Skin:  Positive for wound.   Objective:     Vital Signs (Most Recent):  Temp: 98.3 °F (36.8 °C) (04/20/23 0810)  Pulse: 78 (04/20/23 0810)  Resp: 16 (04/20/23 1434)  BP: 126/70 (04/20/23 0810)  SpO2: 97 % (04/20/23 0810)   Vital Signs (24h Range):  Temp:  [97.9 °F (36.6 °C)-98.3 °F (36.8 °C)] 98.3 °F (36.8 °C)  Pulse:  [72-87] 78  Resp:  [12-18] 16  SpO2:  [96 %-98 %] 97 %  BP: (101-126)/(50-70) 126/70     Weight: 79.3 kg (174 lb 13.2 oz)  Body mass index is 24.38 kg/m².    Intake/Output Summary (Last 24 hours) at 4/20/2023 1812  Last data filed at 4/20/2023 0728  Gross per 24 hour   Intake 541.01 ml   Output 1550 ml   Net -1008.99 ml        Physical Exam  Vitals and nursing note reviewed.   Constitutional:       General: He is not in acute distress.     Appearance: He is well-developed.   HENT:      Head: Normocephalic and atraumatic.   Eyes:      Conjunctiva/sclera: Conjunctivae normal.      Pupils: Pupils are equal, round, and reactive to light.   Neck:      Vascular: No JVD.   Cardiovascular:      Rate and Rhythm: Normal rate and regular rhythm.      Heart sounds: Normal heart sounds.   Pulmonary:      Effort: Pulmonary effort is normal. No respiratory distress.      Breath sounds: Normal breath sounds. No wheezing.   Abdominal:      General: Bowel sounds are normal. There is no distension.      Palpations: Abdomen is soft.      Tenderness: There is no abdominal tenderness. There is no guarding.   Musculoskeletal:         General: Tenderness (R BKA) present. Normal  range of motion.      Cervical back: Normal range of motion and neck supple.   Skin:     General: Skin is warm and dry.      Capillary Refill: Capillary refill takes less than 2 seconds.      Findings: Erythema present.   Neurological:      Mental Status: He is alert and oriented to person, place, and time.   Psychiatric:         Behavior: Behavior normal.       Significant Labs: All pertinent labs within the past 24 hours have been reviewed.  CBC:   Recent Labs   Lab 04/19/23 0417 04/20/23  0446   WBC 2.12* 5.96   HGB 13.4* 8.4*   HCT 45.6 28.6*   * 317       CMP:   Recent Labs   Lab 04/19/23 0417 04/20/23  0446   * 132*   K 3.9 4.7   * 111*   CO2 15* 15*   * 129*   BUN 20 31*   CREATININE 1.7* 2.0*   CALCIUM 7.1* 7.7*   ANIONGAP 7* 6*             Significant Imaging: I have reviewed all pertinent imaging results/findings within the past 24 hours.

## 2023-04-21 NOTE — PROGRESS NOTES
[facility] - Med Surg  Adult Nutrition  Progress Note    SUMMARY       Recommendations    Recommendation/Intervention:   1. Recommend modify pt diet to Diabetic 2000 calorie, Cardiac diet   2. Recommend Aguilar BID for wound healing   3. Recommend Banatrol TID to assist with diarrhea   4. Recommend meal set up assistance with each meal/snack   5. Recommend daily weights    Goals:   1. Pt will continue to tolerate and consume >75% est. Needs by RD follow up   2. Pt will consume Aguilar BID prior to RD follow up   3. Pt diarrhea will improve by RD follow up  4. Pt will receive meal set up feeding assistance prior to RD follow up   Nutrition Goal Status: new  Communication of RD Recs: other (comment); (POC, sticky note)    Assessment and Plan    Nutrition Problem  Increased nutrient needs    Related to (etiology):   Increased demand for nutrition     Signs and Symptoms (as evidenced by):   Diabetic with wound healing needs  Diarrhea  Nausea    Interventions(treatment strategy):  1. Carbohydrate, Sodium and Fat modified diet   2. Wound healing Medical Food Supplement Therapy  3. Commercial food  4. Meal set up feeding assistance  5. Collaboration of care with medical providers     Nutrition Diagnosis Status:   New     Malnutrition Assessment     Skin (Micronutrient): dry, wounds unhealed, turgor reduced (Matthew score = 15 (mild risk))       Hand  Strength, Left (Malnutrition): Reduced strength  Hand  Strength, Right (Malnutrition): Reduced strength                         Reason for Assessment    Reason For Assessment: identified at risk by screening criteria  Diagnosis: other (see comments) (Fungemia)  Relevant Medical History: ESRD, DINORAH on CKD, DMT2, Ulcer of R lower extremity, Metabolic acidiosis, HTN, COPD, Hyperlipidemia, Hyponatremia, GERD  Hx: BKA, S/p kidney transplant  General Information Comments:   4/21/23: 67 y.o. Male admitted for Fungemia. Visited pt at bedside, pt sitting up in bed, alert. Pt reported  "having a good appetite, confirmed % PO intake of meals, stated he is a picky eater, reported he needs assistance with meal set up, pt reported decreased hand strength. Pt reported experiencing some diarrhea, stated it is improving, stated nausea comes and goes, stated difficulty swallowing only with lunch, stated believes diarrhea, nausea and lunch mealtime difficulty swallowing is d/t medications, reported it "always happens to him when on these medications" specifically Abx, pt did not report any abdominal pain/distention. Note pt experiencing pain with R leg/stump, stated plan to have AKA on Monday. Noted some mild AMS. Pt stated he is unsure his normal weight. Pt appeared well nourished, no NFPE warranted at this time. Reviewed chart: LBM 4/17 (x 4 days no BM); Skin: dry, Per wound note 4/18 "...present on admission skin breakdown to right distal stump and coccyx. Patient admitted from home at request of HH nurse due to worsening of right distal stump ulceration now with bone exposure; He reported that wound first appeared after wearing his prosthesis", "Full thickness ulceration again noted to coccyx with moist pink hypergranulation tissue and macerated wound edges"; Matthew score: 15 (mild risk); Edema: None. Labs, meds, weight reviewed. Na (L), Calcium (L), Albumin (L), Anion gap (L), BUN (H), Cr (H), eGFR 30. Note tacrolimus, sodium bicarbonate, MTV, Abx, levothyroxine, Insulin detemir, ferrous sulfate, cholecalciferol, atorvastatin. Weight charted 4/11 181 lbs, 4/21 173 lbs, -8 lb wt loss (4.4% wt change) x 10 days. RD will continue to monitor.  Nutrition Discharge Planning: Diabetic, Cardiac diet    Nutrition Risk Screen    Nutrition Risk Screen: large or nonhealing wound, burn or pressure injury    Nutrition/Diet History    Spiritual, Cultural Beliefs, Adventism Practices, Values that Affect Care: yes  Food Allergies: NKFA  Factors Affecting Nutritional Intake: diarrhea, difficulty/impaired " "swallowing, nausea/vomiting, pain    Anthropometrics    Temp: 98.6 °F (37 °C)  Height Method: Stated  Height: 5' 11" (180.3 cm)  Height (inches): 71 in  Weight Method: Bed Scale  Weight: 78.5 kg (173 lb 1 oz)  Weight (lb): 173.06 lb  Ideal Body Weight (IBW), Male: 172 lb  % Ideal Body Weight, Male (lb): 100.62 %  BMI (Calculated): 24.1  BMI Grade: 18.5-24.9 - normal  Amputation %: 5.9  Total Amputation %: 5.9  Amputation Ideal Body Weight (IBW), Male (lb): 166.1 lb  Amputee BMI (kg/m2): 25.72 kg/m2  Additional Documentation: Amputations Present (Ideal Body Weight)    Wt Readings from Last 15 Encounters:   04/21/23 78.5 kg (173 lb 1 oz)   04/11/23 82.4 kg (181 lb 10.5 oz)   11/12/22 88 kg (194 lb)   10/14/22 96.5 kg (212 lb 11.9 oz)   09/02/22 102 kg (224 lb 13.9 oz)   08/08/22 114.7 kg (252 lb 13.9 oz)   08/15/21 102.5 kg (226 lb)   02/11/21 101.6 kg (224 lb)   10/07/20 101.6 kg (224 lb)   08/12/20 101.6 kg (224 lb)   07/08/20 104.3 kg (230 lb)   07/07/20 104.3 kg (230 lb)   07/06/20 108.1 kg (238 lb 6.4 oz)   06/30/20 104.5 kg (230 lb 6.1 oz)   06/24/20 105.4 kg (232 lb 5.8 oz)     Lab/Procedures/Meds    Pertinent Labs Reviewed: reviewed  Pertinent Labs Comments: Na (L), Calcium (L), Albumin (L), Anion gap (L), BUN (H), Cr (H), eGFR 30  Pertinent Medications Reviewed: reviewed  Pertinent Medications Comments: tacrolimus, sodium bicarbonate, MTV, Abx, levothyroxine, Insulin detemir, ferrous sulfate, cholecalciferol, atorvastatin  BMP  Lab Results   Component Value Date     (L) 04/21/2023    K 4.6 04/21/2023     04/21/2023    CO2 14 (L) 04/21/2023    BUN 39 (H) 04/21/2023    CREATININE 2.3 (H) 04/21/2023    CALCIUM 8.5 (L) 04/21/2023    ANIONGAP 7 (L) 04/21/2023    EGFRNORACEVR 30 (A) 04/21/2023     Lab Results   Component Value Date    ALBUMIN 2.3 (L) 04/21/2023    Scheduled Meds:   atorvastatin  80 mg Oral Daily    ceFEPIme (MAXIPIME) 2 g/50 mL in D5W IVPB (MB+)  2 g Intravenous Q12H    cholecalciferol " (vitamin D3)  5,000 Units Oral Every Mon    ferrous sulfate  1 tablet Oral BID    gabapentin  300 mg Oral BID    insulin detemir U-100  12 Units Subcutaneous BID    levothyroxine  125 mcg Oral Daily    metroNIDAZOLE  500 mg Oral Q8H    micafungin (MYCAMINE) IVPB  100 mg Intravenous Q24H    miconazole NITRATE 2 %   Topical (Top) BID    multivitamin  1 tablet Oral Daily    sodium bicarbonate  650 mg Oral TID    tacrolimus  0.5 mg Oral Daily PM    tacrolimus  1 mg Oral Daily AM     Continuous Infusions:  PRN Meds:.acetaminophen, acetaminophen, albuterol-ipratropium, dextrose 10%, dextrose 10%, dextrose, dextrose, diphenhydrAMINE, glucagon (human recombinant), HYDROcodone-acetaminophen, insulin aspart U-100, melatonin, morphine, naloxone, ondansetron, sodium chloride 0.9%, Pharmacy to dose Vancomycin consult **AND** vancomycin - pharmacy to dose    Physical Findings/Assessment         Estimated/Assessed Needs    Weight Used For Calorie Calculations: 75.5 kg (166 lb 7.2 oz) (IBW (BKA))  Energy Calorie Requirements (kcal): 6004-3731 kcals (25-35 kcals/kg IBW (ESRD, non dialysis, BKA)  Energy Need Method: Kcal/kg  Protein Requirements: 45-61 g (0.6-0.8 g/kg IBW (ESRD, DM, BKA)  Weight Used For Protein Calculations: 75.5 kg (166 lb 7.2 oz)  Fluid Requirements (mL): 1420-0243 mL (1 mL/kcal)  Estimated Fluid Requirement Method: RDA Method  RDA Method (mL): 1887  CHO Requirement: 235-330 (3481-6206 kcals/8)      Nutrition Prescription Ordered    Current Diet Order: Diabetic 2000 calorie, Renal, Cardiac diet    Evaluation of Received Nutrient/Fluid Intake  I/O: (Net since admit)  4/21: -348.9 mL    Energy Calories Required: meeting needs  Protein Required: meeting needs  Fluid Required: not meeting needs  Tolerance: tolerating  % Intake of Estimated Energy Needs: 75 - 100 %  % Meal Intake: 75 - 100 %    Nutrition Risk    Level of Risk/Frequency of Follow-up: low (F/u x 1 weekly)     Monitor and Evaluation    Food and Nutrient  Intake: energy intake, food and beverage intake  Food and Nutrient Adminstration: diet order  Knowledge/Beliefs/Attitudes: food and nutrition knowledge/skill, beliefs and attitudes  Anthropometric Measurements: weight, weight change, body mass index  Biochemical Data, Medical Tests and Procedures: electrolyte and renal panel, gastrointestinal profile, glucose/endocrine profile, inflammatory profile, lipid profile     Nutrition Follow-Up    RD Follow-up?: Yes  Sherri Heard, Registration Eligible, Provisional LDN

## 2023-04-21 NOTE — PLAN OF CARE
Nutrition Recommendations 4/21:  1. Recommend modify pt diet to Diabetic 2000 calorie, Cardiac diet   2. Recommend Aguilar BID for wound healing   3. Recommend Banatrol TID to assist with diarrhea   4. Recommend meal set up assistance with each meal/snack   5. Recommend daily weights  6. Collaboration of care with medical providers     Goals:   1. Pt will continue to tolerate and consume >75% est. Needs by RD follow up   2. Pt will consume Aguilar BID prior to RD follow up   3. Pt diarrhea will improve by RD follow up  4. Pt will receive meal set up feeding assistance prior to RD follow up     Sherri Heard, Registration Eligible, Provisional LDN

## 2023-04-21 NOTE — CONSULTS
[facility]  Infectious Disease  Consult Note    Patient Name: Lavelle Ladd  MRN: [id number]  Admission Date: 4/17/2023  Hospital Length of Stay: 1 days  Attending Physician: Steve Hanna MD  Primary Care Provider: Primary Doctor No     Isolation Status: No active isolations    Patient information was obtained from patient, past medical records and ER records.      Consults  Assessment/Plan:     Cardiac/Vascular  Essential hypertension  BP control as per primary team    Renal/  Renal transplant, status post  Follow transplant team    ID  * Fungemia  Will start Micafungin-will need to remove PICC line   Will follow ID of org, will need eye exam to rule out endophthalmitis at some point      04/20- will continue Micafungin, will remove PICC line  Follow repeat culture- will do ECHO    Endocrine  Type 2 diabetes mellitus with hyperglycemia, with long-term current use of insulin  Insulin regime as per primary team    Orthopedic  Ulcer of right lower extremity with bone involvement without evidence of necrosis  Will follow vascular surgery \Follow repeat cultures-  Will use vanco/cefepime/flagyl for now  Will need I and d     04/20- follow vascular surgery for AKA  Continue Cefepime, micafungin,flagyl    Stop Vanco if no MRSA noted         Thank you for your consult. I will follow-up with patient. Please contact us if you have any additional questions.    Ronny Veliz MD, Critical access hospital  Infectious Disease  [facility]    Subjective:     Principal Problem: Fungemia    HPI:   67  year old man with PMHx of CAD, CHF, s/p kidney transplant  on 5/21/16, COPD, hyperlipidemia, hypertension, peripheral arterial disease, peripheral vascular disease, below knee amputation of lower extremity admitted with worsening right lower extremity wound.  He was recently seen earlier this wound and was discharged to complete 6 weeks of Cefepime/flagyl.  Labs and imaging test reviewed-    Specimen: Wound from Leg, Right Updated:  04/10/23 1038      Aerobic Bacterial Culture PSEUDOMONAS AERUGINOSA   Moderate    Abnormal      PROVIDENCIA STUARTII   Moderate   Skin skylar also present    Blood culture- 04/05-strep/proteus   Component      Latest Ref Rng & Units 4/18/2023 4/17/2023 4/17/2023            1:46 PM 10:45 AM   WBC      3.90 - 12.70 K/uL 5.82 9.12 10.66       Interval History:   67 year old man with right BKA stump infection- cultures-  Pseudomonas -04/18  Blood culture -04/17- Candida Glabrata     Review of Systems   Constitutional:  Negative for activity change, appetite change, chills, diaphoresis, fatigue and fever.   HENT:  Negative for congestion and dental problem.    Respiratory:  Negative for apnea and chest tightness.    Cardiovascular:  Negative for chest pain and leg swelling.   Gastrointestinal:  Negative for abdominal distention.   Neurological:  Negative for dizziness and facial asymmetry.   Hematological:  Negative for adenopathy. Does not bruise/bleed easily.   Objective:     Vital Signs (Most Recent):  Temp: 98 °F (36.7 °C) (04/21/23 0430)  Pulse: 79 (04/21/23 0430)  Resp: 18 (04/21/23 0430)  BP: 117/66 (04/21/23 0430)  SpO2: 97 % (04/21/23 0430) Vital Signs (24h Range):  Temp:  [97.7 °F (36.5 °C)-98.3 °F (36.8 °C)] 98 °F (36.7 °C)  Pulse:  [78-94] 79  Resp:  [12-19] 18  SpO2:  [96 %-97 %] 97 %  BP: (101-140)/(54-70) 117/66     Weight: 78.5 kg (173 lb 1 oz)  Body mass index is 24.14 kg/m².    Estimated Creatinine Clearance: 37.1 mL/min (A) (based on SCr of 2 mg/dL (H)).    Physical Exam  Vitals and nursing note reviewed.   Constitutional:       Appearance: He is normal weight.   HENT:      Head: Normocephalic.   Cardiovascular:      Rate and Rhythm: Tachycardia present.   Musculoskeletal:         General: Normal range of motion.      Cervical back: Normal range of motion.      Comments: Dressing noted   Neurological:      Mental Status: He is alert.       Significant Labs: Blood Culture:   Recent Labs   Lab  04/05/23  1757 04/05/23  1945 04/17/23  1347   LABBLOO Gram stain susan bottle: Gram negative rods, Gram positive cocci  Results called to and read back by: Ashley Nails RN 04/06/2023  14:48  Gram stain aer bottle: Gram positive cocci and Gram negative rods  Positive results previously called 04/06/2023  STREPTOCOCCUS AGALACTIAE (GROUP B)  Beta-hemolytic streptococci are routinely susceptible to   penicillins,cephalosporins and carbapenems.  *  PROTEUS MIRABILIS* Gram stain susan bottle: Gram negative rods  Results called to and read back by: Ashley Nails RN 04/06/2023 14:48  Gram stain aer bottle: Gram negative rods  Positive results previously called 04/07/2023  20:26  PROTEUS MIRABILIS  For susceptibility see order #[id number]  * No Growth to date  No Growth to date  No Growth to date  No Growth to date  Gram stain susan bottle: yeast  Results called to and read back by:Sebastien Rowley LPN 04/19/2023  09:50  MCKAY GLABRATA  ID consult required at [facility] and [place] locations.  *     BMP:   Recent Labs   Lab 04/20/23  0446   *   *   K 4.7   *   CO2 15*   BUN 31*   CREATININE 2.0*   CALCIUM 7.7*     CBC:   Recent Labs   Lab 04/20/23  0446   WBC 5.96   HGB 8.4*   HCT 28.6*        Wound Culture:   Recent Labs   Lab 04/06/23  1707 04/18/23  1310   LABAERO PSEUDOMONAS AERUGINOSA  Moderate  *  PROVIDENCIA STUARTII  Moderate  Skin syklar also present  * PRESUMPTIVE PSEUDOMONAS SPECIES  Moderate  Identification and susceptibility pending  *     All pertinent labs within the past 24 hours have been reviewed.    Significant Imaging: I have reviewed all pertinent imaging results/findings within the past 24 hours.

## 2023-04-21 NOTE — PLAN OF CARE
Discussed poc with pt, pt verbalized understanding.  Wound care performed.   Purposeful rounding every 2 hours  VS stable  Bed locked at lowest position  Call light within reach  Orders acknowledged  Chart check complete  Education reviewed and assessed   Blood glucose monitoring   Fall precautions in place, remains injury free  Pain and nausea under control with PRN meds.  IVFs, Accurate I&Os  Will continue with POC

## 2023-04-21 NOTE — SUBJECTIVE & OBJECTIVE
Interval History: No acute events overnight. Plan for R AKA early next week.     Review of Systems   Constitutional:  Negative for chills and fever.   HENT:  Negative for congestion.    Respiratory:  Negative for cough and shortness of breath.    Cardiovascular:  Negative for chest pain.   Gastrointestinal:  Negative for abdominal pain, diarrhea, nausea and vomiting.   Musculoskeletal:  Positive for arthralgias.   Skin:  Positive for wound.   Objective:     Vital Signs (Most Recent):  Temp: 98.6 °F (37 °C) (04/21/23 1644)  Pulse: 79 (04/21/23 1644)  Resp: 18 (04/21/23 1644)  BP: (!) 92/56 (04/21/23 1644)  SpO2: 97 % (04/21/23 1644)   Vital Signs (24h Range):  Temp:  [97 °F (36.1 °C)-98.6 °F (37 °C)] 98.6 °F (37 °C)  Pulse:  [79-94] 79  Resp:  [16-19] 18  SpO2:  [91 %-99 %] 97 %  BP: ()/(51-66) 92/56     Weight: 78.5 kg (173 lb 1 oz)  Body mass index is 24.14 kg/m².    Intake/Output Summary (Last 24 hours) at 4/21/2023 1747  Last data filed at 4/21/2023 1434  Gross per 24 hour   Intake 360 ml   Output 500 ml   Net -140 ml        Physical Exam  Vitals and nursing note reviewed.   Constitutional:       General: He is not in acute distress.     Appearance: He is well-developed.   HENT:      Head: Normocephalic and atraumatic.   Eyes:      Conjunctiva/sclera: Conjunctivae normal.      Pupils: Pupils are equal, round, and reactive to light.   Neck:      Vascular: No JVD.   Cardiovascular:      Rate and Rhythm: Normal rate and regular rhythm.      Heart sounds: Normal heart sounds.   Pulmonary:      Effort: Pulmonary effort is normal. No respiratory distress.      Breath sounds: Normal breath sounds. No wheezing.   Abdominal:      General: Bowel sounds are normal. There is no distension.      Palpations: Abdomen is soft.      Tenderness: There is no abdominal tenderness. There is no guarding.   Musculoskeletal:         General: Tenderness (R BKA) present. Normal range of motion.      Cervical back: Normal range of  motion and neck supple.   Skin:     General: Skin is warm and dry.      Capillary Refill: Capillary refill takes less than 2 seconds.      Findings: Erythema present.   Neurological:      Mental Status: He is alert and oriented to person, place, and time.   Psychiatric:         Behavior: Behavior normal.       Significant Labs: All pertinent labs within the past 24 hours have been reviewed.  CBC:   Recent Labs   Lab 04/20/23  0446 04/21/23  0823   WBC 5.96 6.35   HGB 8.4* 8.2*   HCT 28.6* 27.9*    327       CMP:   Recent Labs   Lab 04/20/23  0446 04/21/23  0823   * 130*   K 4.7 4.6   * 109   CO2 15* 14*   * 100   BUN 31* 39*   CREATININE 2.0* 2.3*   CALCIUM 7.7* 8.5*   PROT  --  6.4   ALBUMIN  --  2.3*   BILITOT  --  0.1   ALKPHOS  --  103   AST  --  32   ALT  --  22   ANIONGAP 6* 7*             Significant Imaging: I have reviewed all pertinent imaging results/findings within the past 24 hours.

## 2023-04-21 NOTE — PROGRESS NOTES
[facility]  Vascular Surgery  Progress Note    Patient Name: Lavelle Ladd  MRN: [id number]  Admission Date: 4/17/2023  Primary Care Provider: Primary Doctor No    Subjective:     IChief Complaint/Reason for Admission: R BKA site, non-healing     History of Present Illness: Per H&P: Lavelle Ladd is a 67 y.o. male patient with a PMHx of CAD, CHF, s/p kidney transplant  on 5/21/16, COPD, hyperlipidemia, hypertension, peripheral arterial disease, peripheral vascular disease, Application of large external fixation device to tibia (Left, 8/4/2022); Closed reduction of injury of tibia (Left, 8/4/2022); Removal of external fixation device (Left, 9/17/2022); Angiography of lower extremity (N/A, 9/23/2022); Angiography of lower extremity (N/A, 9/29/2022); and Below knee amputation of lower extremity (Left, 10/6/2022) who presents to the Emergency Department for RLE wound check. Pt reports a wound to his R BKA stump for the past week, but states that it has worsened since this morning.  Was evaluated by home health nurse, and has been recommended to go to ED due to likely findings of exposure of bone on examination.Of note patient has been hospitalized from 04/05 to 4/13- with septic shock/  bacteremia , wound infection ,C,diff and osteo ; initially has been admitted to ICU due to requirement of pressors, has been downgraded to floor and subsequently got discharged on 04/13 on 6 weeks of antibiotics including IV cefepime and p.o. vanc with end of treatment 05/25/2023.         HOSPITAL COURSE:   4/17/2023: Pt was evaluated by Vascular on Monday and recommended for above knee amputation as it was felt that simple revision of his below knee amputation would not yield a suitable stump for prosthesis fitting. In addition, given concerns for osteomyleitis and sepsis/bacteremia pt was recommended for AKA.  Pt at that time wanted to think about it and we were subsequently notified that pt had refused surgery.  "    4/20/2023: Asked to re-evaluate pt this evening as we were notified pt had further questions and/or wanted a second opinion.  On my evaluation of the patient this evening, the patient appears in an altered state and is unable to focus his attention.  When I tried to speak with him about "the bone sticking out of his leg" he simply repeats over and over again "I'm good, they already anna blood, I'm good"    Post-Op Info:  * No surgery found *          Medications:  Continuous Infusions:  Scheduled Meds:   atorvastatin  80 mg Oral Daily    ceFEPIme (MAXIPIME) 2 g/50 mL in D5W IVPB (MB+)  2 g Intravenous Q12H    cholecalciferol (vitamin D3)  5,000 Units Oral Every Mon    ferrous sulfate  1 tablet Oral BID    gabapentin  300 mg Oral TID    insulin detemir U-100  12 Units Subcutaneous BID    levothyroxine  125 mcg Oral Daily    metroNIDAZOLE  500 mg Oral Q8H    micafungin (MYCAMINE) IVPB  100 mg Intravenous Q24H    miconazole NITRATE 2 %   Topical (Top) BID    multivitamin  1 tablet Oral Daily    sodium bicarbonate  650 mg Oral TID    tacrolimus  0.5 mg Oral Daily PM    tacrolimus  1 mg Oral Daily AM     PRN Meds:acetaminophen, acetaminophen, albuterol-ipratropium, dextrose 10%, dextrose 10%, dextrose, dextrose, diphenhydrAMINE, glucagon (human recombinant), HYDROcodone-acetaminophen, insulin aspart U-100, melatonin, morphine, naloxone, ondansetron, sodium chloride 0.9%, Pharmacy to dose Vancomycin consult **AND** vancomycin - pharmacy to dose     Objective:     Vital Signs (Most Recent):  Temp: 98.3 °F (36.8 °C) (04/20/23 0810)  Pulse: 78 (04/20/23 0810)  Resp: 16 (04/20/23 1434)  BP: 126/70 (04/20/23 0810)  SpO2: 97 % (04/20/23 0810) Vital Signs (24h Range):  Temp:  [97.9 °F (36.6 °C)-98.3 °F (36.8 °C)] 98.3 °F (36.8 °C)  Pulse:  [72-87] 78  Resp:  [12-18] 16  SpO2:  [96 %-98 %] 97 %  BP: (101-126)/(50-70) 126/70     Date 04/20/23 0700 - 04/21/23 0659   Shift 0839-0674 8509-7619 2966-2494 24 Hour Total   INTAKE "   IV Piggyback 299.5   299.5   Shift Total(mL/kg) 299.5(3.8)   299.5(3.8)   OUTPUT   Shift Total(mL/kg)       Weight (kg) 79.3 79.3 79.3 79.3       Physical Exam    Significant Labs:  CBC:   Recent Labs   Lab 04/20/23  0446   WBC 5.96   RBC 3.42*   HGB 8.4*   HCT 28.6*      MCV 84   MCH 24.6*   MCHC 29.4*     CMP:   Recent Labs   Lab 04/20/23  0446   *   CALCIUM 7.7*   *   K 4.7   CO2 15*   *   BUN 31*   CREATININE 2.0*     Coagulation: No results for input(s): LABPROT, INR, APTT in the last 48 hours.    Significant Diagnostics:  I have reviewed all pertinent imaging results/findings within the past 24 hours.    Assessment/Plan:     Active Diagnoses:    Diagnosis Date Noted POA    PRINCIPAL PROBLEM:  Fungemia [B49] 04/19/2023 Unknown    Ulcer of right lower extremity with bone involvement without evidence of necrosis [L97.916] 04/17/2023 Yes    Hyponatremia [E87.1] 04/17/2023 Yes    Metabolic acidosis [E87.20] 04/17/2023 Yes    Renal transplant, status post [Z94.0] 11/30/2016 Not Applicable    Chronic obstructive pulmonary disease [J44.9] 09/12/2016 Yes    Hyperlipidemia [E78.5] 07/01/2016 Yes    Type 2 diabetes mellitus with hyperglycemia, with long-term current use of insulin [E11.65, Z79.4]  Not Applicable    Essential hypertension [I10] 02/20/2015 Yes    GERD (gastroesophageal reflux disease) [K21.9] 10/12/2013 Yes      Problems Resolved During this Admission:     66 y/o male multiple medical issues previous left leg amputation last year, now with prosthesis on the left  S/p right BKA (unclear when or where this was performed) with chronic incisional breakdown now with exposed tibia.    As noted above, I did attempt to discuss with the pt options for his right leg but pt does not appear to have capacity to consent at this point.  Regardless, recommendations would remain the same- conversion to right above knee amputation.  Will follow up with you and can proceed once pt is able to  decide and wishes to proceed.    Donal Mcdonald MD  Vascular Surgery  [facility]

## 2023-04-21 NOTE — ASSESSMENT & PLAN NOTE
Will follow vascular surgery \Follow repeat cultures-  Will use vanco/cefepime/flagyl for now  Will need I and d     04/20- follow vascular surgery for AKA  Continue Cefepime, micafungin,flagyl    Stop Vanco if no MRSA noted

## 2023-04-21 NOTE — ASSESSMENT & PLAN NOTE
Bicarb of 16, anion gap 9, likely secondary to underlying kidney disease   Monitor and consider replacement as needed   Nephrology following     4/19  -resume po bicarb

## 2023-04-21 NOTE — CONSULTS
Pharmacokinetic Assessment Follow Up: IV Vancomycin    Vancomycin serum concentration assessment(s):    The random level was drawn correctly and can be used to guide therapy at this time. The measurement is above the desired definitive target range of 15 to 20 mcg/mL.    Vancomycin Regimen Plan:    Re-dose when the random level is less than 20 mcg/mL, next level to be drawn at 0700 on 4/21    Drug levels (last 3 results):  Recent Labs   Lab Result Units 04/18/23  1310 04/19/23  1548 04/20/23  1925   Vancomycin, Random ug/mL 13.0 19.6 22.4       Pharmacy will continue to follow and monitor vancomycin.    Please contact pharmacy at extension [phone]   for questions regarding this assessment.    Patient brief summary:  Lavelle Ladd is a 67 y.o. male initiated on antimicrobial therapy with IV Vancomycin for treatment of bone/joint infection    The patient's current regimen is pulse dosing, will hold dose for now until random level is below 20 mcg/ml.    Drug Allergies:   Review of patient's allergies indicates:  No Known Allergies    Actual Body Weight:   79.3 kg    Renal Function:   Estimated Creatinine Clearance: 37.1 mL/min (A) (based on SCr of 2 mg/dL (H)).,     Dialysis Method (if applicable):  N/A    CBC (last 72 hours):  Recent Labs   Lab Result Units 04/18/23 0523 04/19/23 0417 04/20/23  0446   WBC K/uL 5.82 2.12* 5.96   Hemoglobin g/dL 8.6* 13.4* 8.4*   Hematocrit % 28.6* 45.6 28.6*   Platelets K/uL 288 134* 317   Gran % % 52.8 59.0 47.6   Lymph % % 29.4 25.5 34.6   Mono % % 11.9 9.4 13.3   Eosinophil % % 4.5 4.7 3.4   Basophil % % 0.9 0.9 0.8   Differential Method  Automated Automated Automated       Metabolic Panel (last 72 hours):  Recent Labs   Lab Result Units 04/18/23 0523 04/19/23 0417 04/20/23  0446   Sodium mmol/L 133* 135* 132*   Potassium mmol/L 4.4 3.9 4.7   Chloride mmol/L 109 113* 111*   CO2 mmol/L 17* 15* 15*   Glucose mg/dL 94 168* 129*   BUN mg/dL 21 20 31*   Creatinine mg/dL 1.7*  1.7* 2.0*       Vancomycin Administrations:  vancomycin given in the last 96 hours                     vancomycin (VANCOCIN) 1,000 mg in dextrose 5 % (D5W) 250 mL IVPB (Vial-Mate) (mg) 1,000 mg New Bag 04/19/23 2005    vancomycin 1,250 mg in dextrose 5 % (D5W) 250 mL IVPB (Vial-Mate) (mg) 1,250 mg New Bag 04/18/23 1515    vancomycin 1,500 mg in dextrose 5 % (D5W) 250 mL IVPB (Vial-Mate) (mg) 1,500 mg New Bag 04/17/23 1809                    Microbiologic Results:  Microbiology Results (last 7 days)       Procedure Component Value Units Date/Time    Blood culture [[id number]] Collected: 04/20/23 1925    Order Status: Sent Specimen: Blood from Antecubital, Left Updated: 04/20/23 1925    Blood culture [[id number]] Collected: 04/20/23 1925    Order Status: Sent Specimen: Blood Updated: 04/20/23 1925    Blood culture x two cultures. Draw prior to antibiotics. [[id number]]  (Abnormal) Collected: 04/17/23 1347    Order Status: Completed Specimen: Blood from Other (Specify) Updated: 04/20/23 1408     Blood Culture, Routine Gram stain susan bottle: yeast      Results called to and read back by:Sebastien Rowley LPN 04/19/2023  09:50      MCKAY GLABRATA  ID consult required at [facility],[place] and [facility] locations.      Narrative:      Aerobic and anaerobic    Aerobic culture [[id number]]  (Abnormal) Collected: 04/18/23 1310    Order Status: Completed Specimen: Abscess from Knee, Right Updated: 04/20/23 1325     Aerobic Bacterial Culture PRESUMPTIVE PSEUDOMONAS SPECIES  Moderate  Identification and susceptibility pending      AFB Culture & Smear [[id number]] Collected: 04/18/23 1310    Order Status: Completed Specimen: Abscess from Knee, Right Updated: 04/20/23 0927     AFB Culture & Smear Culture in progress     AFB CULTURE STAIN No acid fast bacilli seen.    Culture, Anaerobe [[id number]] Collected: 04/18/23 1310    Order Status: Completed Specimen: Abscess from Knee, Right Updated: 04/20/23 0729     Anaerobic Culture Culture in  progress    Blood culture x two cultures. Draw prior to antibiotics. [[id number]] Collected: 04/17/23 1347    Order Status: Completed Specimen: Blood from Other (Specify) Updated: 04/20/23 0612     Blood Culture, Routine No Growth to date      No Growth to date      No Growth to date    Narrative:      Aerobic and anaerobic    Rapid Organism ID by PCR (from Blood culture) [[id number]]  (Abnormal) Collected: 04/17/23 1347    Order Status: Completed Updated: 04/19/23 1530     Enterococcus faecalis Not Detected     Enterococcus faecium Not Detected     Listeria Monocytogenes Not Detected     Staphylococcus spp. Not Detected     Staphylococcus aureus Not Detected     Staphylococcus epidermidis Not Detected     Staphylococcus lugdunensis Not Detected     Streptococcus species Not Detected     Streptococcus agalactiae Not Detected     Streptococcus pneumoniae Not Detected     Streptococcus pyogenes Not Detected     Acinetobacter calcoaceticus/baumannii complex Not Detected     Bacteroides fragilis Not Detected     Enterobacerales Not Detected     Enterobacter cloacae complex Not Detected     Escherichia Not Detected     Klebsiella aerogenes Not Detected     Klebsiella oxytoca Not Detected     Klebsiella pneumoniae group Not Detected     Proteus Not Detected     Salmonella sp Not Detected     Serratia marcescens Not Detected     Haemophilus influenzae Not Detected     Neisseria meningtidis Not Detected     Pseudomonas aeruginosa Not Detected     Stenotrophomonas maltophilia Not Detected     Candida albicans Not Detected     Candida auris Not Detected     Palmira glabrata Detected     Palmira krusei Not Detected     Candida parapsilosis Not Detected     Candida tropicalis Not Detected     Cryptococcus neoformans/gattii Not Detected     CTX-M (ESBL ) Test Not Applicable     IMP (Carbapenem resistant) Test Not Applicable     KPC resistance gene (Carbapenem resistant) Test Not Applicable     mcr-1  Test Not Applicable      mec A/C  Test Not Applicable     mec A/C and MREJ (MRSA) gene Test Not Applicable     NDM (Carbapenem resistant) Test Not Applicable     OXA-48-like (Carbapenem resistant) Test Not Applicable     van A/B (VRE gene) Test Not Applicable     VIM (Carbapenem resistant) Test Not Applicable    Narrative:      Aerobic and anaerobic    Urine culture [[id number]] Collected: 04/17/23 1534    Order Status: Completed Specimen: Urine Updated: 04/19/23 0826     Urine Culture, Routine No significant growth    Narrative:      Specimen Source->Urine

## 2023-04-21 NOTE — CONSULTS
Pharmacokinetic Assessment Follow Up: IV Vancomycin    Vancomycin serum concentration assessment(s):    The random level was drawn correctly and can be used to guide therapy at this time. The measurement is within the desired definitive target range of 15 to 20 mcg/mL.    Vancomycin Regimen Plan:    Re-dose when the random level is less than 20 mcg/mL, next level to be drawn at 1900 on 4/21    Drug levels (last 3 results):  Recent Labs   Lab Result Units 04/19/23  1548 04/20/23  1925 04/21/23  0651   Vancomycin, Random ug/mL 19.6 22.4 20.0       Pharmacy will continue to follow and monitor vancomycin.    Please contact pharmacy at extension [phone]   for questions regarding this assessment.     Patient brief summary:  Lavelle Ladd is a 67 y.o. male initiated on antimicrobial therapy with IV Vancomycin for treatment of bone/joint infection    The patient's current regimen is on hold, we are pulse dosing when level is below 20 mcg/ml.    Drug Allergies:   Review of patient's allergies indicates:  No Known Allergies    Actual Body Weight:   78.5 kg    Renal Function:   Estimated Creatinine Clearance: 32.3 mL/min (A) (based on SCr of 2.3 mg/dL (H)).,     Dialysis Method (if applicable):  N/A    CBC (last 72 hours):  Recent Labs   Lab Result Units 04/19/23 0417 04/20/23 0446 04/21/23  0823   WBC K/uL 2.12* 5.96 6.35   Hemoglobin g/dL 13.4* 8.4* 8.2*   Hematocrit % 45.6 28.6* 27.9*   Platelets K/uL 134* 317 327   Gran % % 59.0 47.6 50.3   Lymph % % 25.5 34.6 31.8   Mono % % 9.4 13.3 14.2   Eosinophil % % 4.7 3.4 2.8   Basophil % % 0.9 0.8 0.6   Differential Method  Automated Automated Automated       Metabolic Panel (last 72 hours):  Recent Labs   Lab Result Units 04/19/23 0417 04/20/23 0446 04/21/23  0823   Sodium mmol/L 135* 132* 130*   Potassium mmol/L 3.9 4.7 4.6   Chloride mmol/L 113* 111* 109   CO2 mmol/L 15* 15* 14*   Glucose mg/dL 168* 129* 100   BUN mg/dL 20 31* 39*   Creatinine mg/dL 1.7* 2.0* 2.3*    Albumin g/dL  --   --  2.3*   Total Bilirubin mg/dL  --   --  0.1   Alkaline Phosphatase U/L  --   --  103   AST U/L  --   --  32   ALT U/L  --   --  22       Vancomycin Administrations:  vancomycin given in the last 96 hours                     vancomycin (VANCOCIN) 1,000 mg in dextrose 5 % (D5W) 250 mL IVPB (Vial-Mate) (mg) 1,000 mg New Bag 04/19/23 2005    vancomycin 1,250 mg in dextrose 5 % (D5W) 250 mL IVPB (Vial-Mate) (mg) 1,250 mg New Bag 04/18/23 1515    vancomycin 1,500 mg in dextrose 5 % (D5W) 250 mL IVPB (Vial-Mate) (mg) 1,500 mg New Bag 04/17/23 1809                    Microbiologic Results:  Microbiology Results (last 7 days)       Procedure Component Value Units Date/Time    Culture, Anaerobe [[id number]] Collected: 04/18/23 1310    Order Status: Completed Specimen: Abscess from Knee, Right Updated: 04/21/23 0941     Anaerobic Culture Culture in progress    Blood culture [[id number]] Collected: 04/20/23 1925    Order Status: Completed Specimen: Blood from Antecubital, Left Updated: 04/21/23 0915     Blood Culture, Routine No Growth to date    Blood culture [[id number]] Collected: 04/20/23 1925    Order Status: Completed Specimen: Blood Updated: 04/21/23 0915     Blood Culture, Routine No Growth to date    Blood culture x two cultures. Draw prior to antibiotics. [[id number]]  (Abnormal) Collected: 04/17/23 1347    Order Status: Completed Specimen: Blood from Other (Specify) Updated: 04/21/23 0742     Blood Culture, Routine Gram stain susan bottle: yeast      Results called to and read back by:Sebastien Rowley LPN 04/19/2023  09:50      MCKYA GLABRATA  ID consult required at [facility].Carla Hennessy and Leigh Ann knapp.      Narrative:      Aerobic and anaerobic    Blood culture x two cultures. Draw prior to antibiotics. [[id number]] Collected: 04/17/23 1347    Order Status: Completed Specimen: Blood from Other (Specify) Updated: 04/21/23 0612     Blood Culture, Routine No Growth to date      No Growth to date       No Growth to date      No Growth to date    Narrative:      Aerobic and anaerobic    Aerobic culture [[id number]]  (Abnormal) Collected: 04/18/23 1310    Order Status: Completed Specimen: Abscess from Knee, Right Updated: 04/20/23 1325     Aerobic Bacterial Culture PRESUMPTIVE PSEUDOMONAS SPECIES  Moderate  Identification and susceptibility pending      AFB Culture & Smear [[id number]] Collected: 04/18/23 1310    Order Status: Completed Specimen: Abscess from Knee, Right Updated: 04/20/23 0927     AFB Culture & Smear Culture in progress     AFB CULTURE STAIN No acid fast bacilli seen.    Rapid Organism ID by PCR (from Blood culture) [[id number]]  (Abnormal) Collected: 04/17/23 1347    Order Status: Completed Updated: 04/19/23 1530     Enterococcus faecalis Not Detected     Enterococcus faecium Not Detected     Listeria Monocytogenes Not Detected     Staphylococcus spp. Not Detected     Staphylococcus aureus Not Detected     Staphylococcus epidermidis Not Detected     Staphylococcus lugdunensis Not Detected     Streptococcus species Not Detected     Streptococcus agalactiae Not Detected     Streptococcus pneumoniae Not Detected     Streptococcus pyogenes Not Detected     Acinetobacter calcoaceticus/baumannii complex Not Detected     Bacteroides fragilis Not Detected     Enterobacerales Not Detected     Enterobacter cloacae complex Not Detected     Escherichia Not Detected     Klebsiella aerogenes Not Detected     Klebsiella oxytoca Not Detected     Klebsiella pneumoniae group Not Detected     Proteus Not Detected     Salmonella sp Not Detected     Serratia marcescens Not Detected     Haemophilus influenzae Not Detected     Neisseria meningtidis Not Detected     Pseudomonas aeruginosa Not Detected     Stenotrophomonas maltophilia Not Detected     Candida albicans Not Detected     Candida auris Not Detected     Palmira glabrata Detected     Palmira krusei Not Detected     Candida parapsilosis Not Detected     Candida  tropicalis Not Detected     Cryptococcus neoformans/gattii Not Detected     CTX-M (ESBL ) Test Not Applicable     IMP (Carbapenem resistant) Test Not Applicable     KPC resistance gene (Carbapenem resistant) Test Not Applicable     mcr-1  Test Not Applicable     mec A/C  Test Not Applicable     mec A/C and MREJ (MRSA) gene Test Not Applicable     NDM (Carbapenem resistant) Test Not Applicable     OXA-48-like (Carbapenem resistant) Test Not Applicable     van A/B (VRE gene) Test Not Applicable     VIM (Carbapenem resistant) Test Not Applicable    Narrative:      Aerobic and anaerobic    Urine culture [[id number]] Collected: 04/17/23 1534    Order Status: Completed Specimen: Urine Updated: 04/19/23 0826     Urine Culture, Routine No significant growth    Narrative:      Specimen Source->Urine

## 2023-04-21 NOTE — PROGRESS NOTES
Received a phone call and a text message from VERO Magallon with [facility] to reach out to the patient's sister, Kecia Sagastume, in regards to his decision regarding his amputation (R BKA to AKA).  I am getting her voice mail (x2). I will continue to try. Will update chart if I am able to speak with the pt's family.     -UPDATE: I was able to speak with the patient's sister.  I have answered her questions regarding surgery.  She is aware that we still need to hear from Mr. Ladd whether he would like to proceed with surgery or not.      Please contact [facility] if patient has made his decision and we will schedule him for early next week. Thank you.    Jacqueline Jorge

## 2023-04-21 NOTE — ASSESSMENT & PLAN NOTE
Will start Micafungin-will need to remove PICC line   Will follow ID of org, will need eye exam to rule out endophthalmitis at some point      04/20- will continue Micafungin, will remove PICC line  Follow repeat culture- will do ECHO

## 2023-04-21 NOTE — PROGRESS NOTES
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 Has fungemia and pseudomonas wound infection    4/21  - he is considering AKA but would like to make final decision on Monday  - good appetite     Review of patient's allergies indicates:  No Known Allergies  Current Facility-Administered Medications   Medication Frequency    acetaminophen tablet 500 mg Q6H PRN    acetaminophen tablet 500 mg Q8H PRN    albuterol-ipratropium 2.5 mg-0.5 mg/3 mL nebulizer solution 3 mL Q6H PRN    atorvastatin tablet 80 mg Daily    ceFEPIme (MAXIPIME) 2 g in dextrose 5 % in water (D5W) 5 % 50 mL IVPB (MB+) Q12H    cholecalciferol (vitamin D3) 125 mcg (5,000 unit) tablet 5,000 Units Every Mon    dextrose 10% bolus 125 mL 125 mL PRN    dextrose 10% bolus 250 mL 250 mL PRN    dextrose 40 % gel 15,000 mg PRN    dextrose 40 % gel 30,000 mg PRN    diphenhydrAMINE capsule 25 mg Q6H PRN    ferrous sulfate tablet 1 each BID    gabapentin capsule 300 mg BID    glucagon (human recombinant) injection 1 mg PRN    HYDROcodone-acetaminophen  mg per tablet 1 tablet Q6H PRN    insulin aspart U-100 pen 0-5 Units QID (AC + HS) PRN    insulin detemir U-100 (Levemir) pen 12 Units BID    levothyroxine tablet 125 mcg Daily    melatonin tablet 6 mg Nightly PRN    metroNIDAZOLE tablet 500 mg Q8H    micafungin 100 mg in sodium chloride 0.9 % 100 mL IVPB (MB+) Q24H    miconazole NITRATE 2 % top powder BID    morphine injection 4 mg Q6H PRN    multivitamin tablet Daily    naloxone 0.4 mg/mL injection 0.02 mg PRN    ondansetron disintegrating tablet 4 mg Q8H  PRN    sodium bicarbonate tablet 650 mg TID    sodium chloride 0.9% flush 10 mL Q12H PRN    tacrolimus capsule 0.5 mg Daily PM    tacrolimus capsule 1 mg Daily AM    vancomycin - pharmacy to dose pharmacy to manage frequency       Objective:     Vital Signs (Most Recent):  Temp: 97 °F (36.1 °C) (04/21/23 1120)  Pulse: 85 (04/21/23 1120)  Resp: 18 (04/21/23 1120)  BP: (!) 102/51 (04/21/23 1120)  SpO2: (!) 91 % (04/21/23 1120)   Vital Signs (24h Range):  Temp:  [97 °F (36.1 °C)-98.3 °F (36.8 °C)] 97 °F (36.1 °C)  Pulse:  [79-94] 85  Resp:  [16-19] 18  SpO2:  [91 %-99 %] 91 %  BP: (101-140)/(51-66) 102/51     Weight: 78.5 kg (173 lb 1 oz) (04/21/23 0114)  Body mass index is 24.14 kg/m².  Body surface area is 1.98 meters squared.    I/O last 3 completed shifts:  In: 541 [IV Piggyback:541]  Out: 2050 [Urine:2050]    Physical Exam  Vitals and nursing note reviewed.   Constitutional:       Appearance: He is ill-appearing. He is not toxic-appearing.   HENT:      Head: Atraumatic.   Cardiovascular:      Rate and Rhythm: Normal rate.   Pulmonary:      Effort: Pulmonary effort is normal. No respiratory distress.   Abdominal:      General: There is no distension.   Neurological:      Mental Status: He is alert and oriented to person, place, and time.       Significant Labs: reviewed         Assessment/Plan:     Active Diagnoses:    Diagnosis Date Noted POA    PRINCIPAL PROBLEM:  Fungemia [B49] 04/19/2023 Unknown    Ulcer of right lower extremity with bone involvement without evidence of necrosis [L97.916] 04/17/2023 Yes    Hyponatremia [E87.1] 04/17/2023 Yes    Metabolic acidosis [E87.20] 04/17/2023 Yes    Renal transplant, status post [Z94.0] 11/30/2016 Not Applicable    Chronic obstructive pulmonary disease [J44.9] 09/12/2016 Yes    Hyperlipidemia [E78.5] 07/01/2016 Yes    Type 2 diabetes mellitus with hyperglycemia, with long-term current use of insulin [E11.65, Z79.4]  Not Applicable    Essential hypertension [I10]  02/20/2015 Yes    GERD (gastroesophageal reflux disease) [K21.9] 10/12/2013 Yes      Problems Resolved During this Admission:     DINORAH on CKD   - sCr increasing and likely from current infections  - continue supportive care as doing    ESRD s/p kidney txp 5/2016  - continue Prograf 1/0.5mg and continue to hold MMF  - check prograf level periodically     Metabolic Acidosis  - continue po bicarb     Hyponatremia  - monitor, no indication for treatment     ID  - currently infected old R BKA and fungemia  - recent Proteus sepsis and C diff    Hx of HTN  - relatively hypotensive currently, c/w sepsis         Gianni Masterson MD  Nephrology

## 2023-04-21 NOTE — ASSESSMENT & PLAN NOTE
Presented with right stump pain (8/10) and discharge; (gradual worsening of lesion with findings of exposed bone)  MRI as of 4/9- Findings concerning for early osteomyelitis of the stump of the tibia about the wound margin.  Xray tibia/ fibula right- Below the knee amputation changes.  No acute fracture or suspicious osseous lesion.  Anatomical joint alignment.  No definite subcutaneous gas.  Soft tissue wound extending to the osseous margin of the distal tibia.  No definite associated erosive ostia changes.  S/p Application of large external fixation device to tibia (Left, 8/4/2022); Closed reduction of injury of tibia (Left, 8/4/2022); Removal of external fixation device (Left, 9/17/2022); Angiography of lower extremity (N/A, 9/23/2022); Angiography of lower extremity (N/A, 9/29/2022); and Below knee amputation of lower extremity (Left, 10/6/2022 ;    Recently discharged on 04/13 on IV cefepime, p.o. Flagyl for 6 weeks' course, with end of treatment 5/25; status post PICC line placed on 04/11 4/20   wound culture positive for pseudomonas, blood cx fungus   - final culture pending   ID following     4/18  - Vascular following- rec R AKA      Afebrile, no leukocytosis on arrival, no tachycardia, no tachypnea, blood pressure is soft, lactic acid 0.6; received 2 L of IV fluids, antibiotics, blood cultures, vascular consulted; wound care follow up  To continue cefepime, Flagyl, follow up with vascular, NPO except for medications since midnight for any anticipated vascular intervention     4/19  Vascular recommends right AKA, patient requesting 2nd opinion   -continue vanc, cefepime, flagyl and micafungin   -follow wound cultures     4/21  -Plan for R AKA early next week  -continue antibiotic and antifungal per ID recs

## 2023-04-21 NOTE — PT/OT/SLP PROGRESS
Physical Therapy  Treatment    Lavelle Ladd   MRN: [id number]   Admitting Diagnosis: Fungemia    PT Received On: 04/21/23  PT Start Time: 1015     PT Stop Time: 1040    PT Total Time (min): 25 min       Billable Minutes:  Therapeutic Activity 15 and Therapeutic Exercise 10    Treatment Type: Treatment  PT/PTA: PT     Number of PTA visits since last PT visit: 0       General Precautions: Standard, fall  Orthopedic Precautions: N/A  Braces: N/A  Respiratory Status: Room air    Spiritual, Cultural Beliefs, Jainism Practices, Values that Affect Care: yes    Subjective:  Communicated with NURSE AND Epic CHART REVIEW  prior to session.   PT AGREED TO TX     Pain/Comfort  Pain Rating 1: 5/10  Location - Side 1: Right  Location 1: leg  Pain Addressed 1: Pre-medicate for activity  Pain Rating Post-Intervention 1: 5/10    Objective:   Patient found with: peripheral IV, telemetry    Functional Mobility:  Bed Mobility:   PT MET IN RM SUP IN BED. PT LOG ROLLED AND SEATED EOB TO LEFT WITH SBA. PT SCOOTED TO EOB WITH SBA. PT DONNED L LE PROSTHESIS WITH SBA. PT COMPLETED 2XSIT>STAND WITH MAX A X 2 HOWEVER UNABLE TO COMPLETE 2ND ATTEMPT. PT RETURNED SEATED FOR REST.     Treatment and Education:  PT COMPLETED B LE TKE AND MIP. PT DOFFED PROSTHESIS AND DONNED . PT SUP IN BED AND SCOOTED TO HOB WITH MIN A. PT LEFT WITH ALL NEEDS MET.      AM-PAC 6 CLICK MOBILITY  How much help from another person does this patient currently need?   1 = Unable, Total/Dependent Assistance  2 = A lot, Maximum/Moderate Assistance  3 = A little, Minimum/Contact Guard/Supervision  4 = None, Modified [place]/Independent    Turning over in bed (including adjusting bedclothes, sheets and blankets)?: 4  Sitting down on and standing up from a chair with arms (e.g., wheelchair, bedside commode, etc.): 2  Moving from lying on back to sitting on the side of the bed?: 4  Moving to and from a bed to a chair (including a wheelchair)?: 1  Need to walk in  hospital room?: 1  Climbing 3-5 steps with a railing?: 1  Basic Mobility Total Score: 13    AM-PAC Raw Score CMS G-Code Modifier Level of Impairment Assistance   6 % Total / Unable   7 - 9 CM 80 - 100% Maximal Assist   10 - 14 CL 60 - 80% Moderate Assist   15 - 19 CK 40 - 60% Moderate Assist   20 - 22 CJ 20 - 40% Minimal Assist   23 CI 1-20% SBA / CGA   24 CH 0% Independent/ Mod I     Patient left HOB elevated with call button in reach.    Assessment:  PT GEOVANY TX WITH INC PAIN     Rehab identified problem list/impairments: weakness, decreased upper extremity function, decreased lower extremity function, impaired balance, impaired endurance, impaired self care skills, impaired functional mobility, decreased coordination, pain, decreased safety awareness, decreased ROM, impaired coordination, impaired skin    Rehab potential is good.    Activity tolerance: Fair    Discharge recommendations: nursing facility, skilled      Barriers to discharge:      Equipment recommendations: none     GOALS:   Multidisciplinary Problems       Physical Therapy Goals          Problem: Physical Therapy    Goal Priority Disciplines Outcome Goal Variances Interventions   Physical Therapy Goal     PT, PT/OT Ongoing, Progressing     Description: LT23  1. PT WILL COMPLETE BED MOBILITY IND  2. PT WILL SQUAD PIVOT T/F TO CHAIR WITH MIN A  3. PT WILL COMPLETE B LE TE X 20 REPS FOR STRENGTHENING.                        PLAN:    Patient to be seen 3 x/week to address the above listed problems via therapeutic activities, therapeutic exercises, wheelchair management/training  Plan of Care expires: 23  Plan of Care reviewed with: patient         2023

## 2023-04-21 NOTE — PROGRESS NOTES
Pharmacist Renal Dose Adjustment Note    Lavelle Ladd is a 67 y.o. male being treated with the medication meropenem.     Patient Data:    Vital Signs (Most Recent):  Temp: 98.6 °F (37 °C) (04/21/23 1644)  Pulse: 79 (04/21/23 1644)  Resp: 16 (04/21/23 1817)  BP: (!) 92/56 (04/21/23 1644)  SpO2: 97 % (04/21/23 1644)   Vital Signs (72h Range):  Temp:  [97 °F (36.1 °C)-98.6 °F (37 °C)]   Pulse:  [68-94]   Resp:  [12-19]   BP: ()/(47-70)   SpO2:  [91 %-99 %]      Recent Labs   Lab 04/19/23  0417 04/20/23  0446 04/21/23  0823   CREATININE 1.7* 2.0* 2.3*     Serum creatinine: 2.3 mg/dL (H) 04/21/23 0823  Estimated creatinine clearance: 32.3 mL/min (A)    Medication: meropenem 1 g IV every 12 hours will be changed to meropenem 500 mg IV every 8 hours per pharmacy renal dose adjustment protocol for patients with CrCl 25-49 mL/min.    Pharmacist's Name: Angie Phipps PharmD  Pharmacist's Extension: [phone]    Thank you for allowing us to participate in this patient's care.     Angie Phipps PharmD 04/21/2023 6:56 PM

## 2023-04-21 NOTE — PROGRESS NOTES
[facility] Medicine  Progress Note    Patient Name: Lavelle Ladd  MRN: [id number]  Patient Class: IP- Inpatient   Admission Date: 4/17/2023  Length of Stay: 1 days  Attending Physician: Steve Hanna MD  Primary Care Provider: Primary Doctor No        Subjective:     Principal Problem:Fungemia        HPI:  Lavelle Ladd is a 67 y.o. male patient with a PMHx of CAD, CHF, s/p kidney transplant  on 5/21/16, COPD, hyperlipidemia, hypertension, peripheral arterial disease, peripheral vascular disease, Application of large external fixation device to tibia (Left, 8/4/2022); Closed reduction of injury of tibia (Left, 8/4/2022); Removal of external fixation device (Left, 9/17/2022); Angiography of lower extremity (N/A, 9/23/2022); Angiography of lower extremity (N/A, 9/29/2022); and Below knee amputation of lower extremity (Left, 10/6/2022) who presents to the Emergency Department for RLE wound check. Pt reports a wound to his R BKA stump for the past week, but states that it has worsened since this morning.  Was evaluated by home health nurse, and has been recommended to go to ED due to likely findings of exposure of bone on examination.  Stated that Symptoms are constant and moderate in severity. No mitigating or exacerbating factors reported. Associated sxs include RLE pain and intermittent nausea. Pt also reports slurred speech since taking Tramadol for the first time this morning. Patient denies any fever, chills, vomiting, SOB, CP, weakness, numbness, dizziness, headache, and all other sxs at this time.   Stated improvement in diarrhea, stated having 2-3 episodes of formed stools, stated being compliant with medications/antibiotics at home.        Of note patient has been hospitalized from 04/05 to 4/13- with septic shock/  bacteremia , wound infection ,C,diff and osteo ; initially has been admitted to ICU due to requirement of pressors, has been downgraded to floor and subsequently got  discharged on 04/13 on 6 weeks of antibiotics including IV cefepime and p.o. vanc with end of treatment 05/25/2023.    Also patient has been started on p.o. vanc for findings of positive C diff on 04/06/2023 and has been recommended to take for total 10 days of duration with end of treatment 04/16/2023. ID has been consulted;   Status post PICC line placement on 04/11/2023.  Also during recent hospitalization for underlying renal transplant with DINORAH findings with creatinine up to 2.2 on admission, nephrology has been consulted, CellCept has been held due to underlying active infection, nephrology recommended Prograf 1 mg in a.m., 0.5 mg q.h.s. to maintain levels between 4-6.    On arrival to ED, patient remained afebrile, no tachycardia, no tachypnea, blood pressure soft, lactic acid 0.6, received 2 L of IV fluids, blood cultures x2 ordered and antibiotics were started within 4 hours duration.  Vascular surgery consulted;   For the findings of slurred speech on arrival, patient underwent CT head while in ED, showed no acute intracranial findings.      Pt has been   admitted multiple times since 8 /4/22  in multiples facility including  [facility], [facility]  and  [facility] .      Lives alone at home, stated that he gets support from his sister, ;  Code status:  Full code (alert and oriented x3)                      Overview/Hospital Course:  Lavelle Ladd is a 66 yo male, who was recently admitted with septic shock/  bacteremia , wound infection ,C diff colitis and osteomyelitis of R BKA stump s/p 6 weeks antimicrobial therapy. He presented to ED with worsening of R BKA wound with bone exposure and necrosis. Vanc, flagyl and cefepime initiated. Vascular surgery consulted with recommendation for right above the knee amputation. Patient states he would like 2nd opinion.   4/19/23 1 of 2 blood cultures collected on admit positive for yeast. Per ID start Micafungin. Repeat blood cultures in am. Wound  cultures pending. Start bicarb for management of metabolic acidosis per nephrology recommendation.   4/20 wound culture positive for pseudomonas. Continue antibiotic recs per ID. Patient requesting to speak with vascular service regarding amputation- as he has more questions before making final decision Dr. Mcdonald notified.   4/21 patient agrees to R AKA next week. Vascular notified.       Interval History: No acute events overnight. Plan for R AKA early next week.     Review of Systems   Constitutional:  Negative for chills and fever.   HENT:  Negative for congestion.    Respiratory:  Negative for cough and shortness of breath.    Cardiovascular:  Negative for chest pain.   Gastrointestinal:  Negative for abdominal pain, diarrhea, nausea and vomiting.   Musculoskeletal:  Positive for arthralgias.   Skin:  Positive for wound.   Objective:     Vital Signs (Most Recent):  Temp: 98.6 °F (37 °C) (04/21/23 1644)  Pulse: 79 (04/21/23 1644)  Resp: 18 (04/21/23 1644)  BP: (!) 92/56 (04/21/23 1644)  SpO2: 97 % (04/21/23 1644)   Vital Signs (24h Range):  Temp:  [97 °F (36.1 °C)-98.6 °F (37 °C)] 98.6 °F (37 °C)  Pulse:  [79-94] 79  Resp:  [16-19] 18  SpO2:  [91 %-99 %] 97 %  BP: ()/(51-66) 92/56     Weight: 78.5 kg (173 lb 1 oz)  Body mass index is 24.14 kg/m².    Intake/Output Summary (Last 24 hours) at 4/21/2023 1747  Last data filed at 4/21/2023 1434  Gross per 24 hour   Intake 360 ml   Output 500 ml   Net -140 ml        Physical Exam  Vitals and nursing note reviewed.   Constitutional:       General: He is not in acute distress.     Appearance: He is well-developed.   HENT:      Head: Normocephalic and atraumatic.   Eyes:      Conjunctiva/sclera: Conjunctivae normal.      Pupils: Pupils are equal, round, and reactive to light.   Neck:      Vascular: No JVD.   Cardiovascular:      Rate and Rhythm: Normal rate and regular rhythm.      Heart sounds: Normal heart sounds.   Pulmonary:      Effort: Pulmonary effort is  normal. No respiratory distress.      Breath sounds: Normal breath sounds. No wheezing.   Abdominal:      General: Bowel sounds are normal. There is no distension.      Palpations: Abdomen is soft.      Tenderness: There is no abdominal tenderness. There is no guarding.   Musculoskeletal:         General: Tenderness (R BKA) present. Normal range of motion.      Cervical back: Normal range of motion and neck supple.   Skin:     General: Skin is warm and dry.      Capillary Refill: Capillary refill takes less than 2 seconds.      Findings: Erythema present.   Neurological:      Mental Status: He is alert and oriented to person, place, and time.   Psychiatric:         Behavior: Behavior normal.       Significant Labs: All pertinent labs within the past 24 hours have been reviewed.  CBC:   Recent Labs   Lab 04/20/23 0446 04/21/23  0823   WBC 5.96 6.35   HGB 8.4* 8.2*   HCT 28.6* 27.9*    327       CMP:   Recent Labs   Lab 04/20/23 0446 04/21/23  0823   * 130*   K 4.7 4.6   * 109   CO2 15* 14*   * 100   BUN 31* 39*   CREATININE 2.0* 2.3*   CALCIUM 7.7* 8.5*   PROT  --  6.4   ALBUMIN  --  2.3*   BILITOT  --  0.1   ALKPHOS  --  103   AST  --  32   ALT  --  22   ANIONGAP 6* 7*             Significant Imaging: I have reviewed all pertinent imaging results/findings within the past 24 hours.      Assessment/Plan:      * Fungemia  -start Micafungin per ID recs   - follow repeat cultures       Metabolic acidosis  Bicarb of 16, anion gap 9, likely secondary to underlying kidney disease   Monitor and consider replacement as needed   Nephrology following     4/19  -resume po bicarb       Hyponatremia  Sodium 129  Baseline sodium runs between 129-133 likely secondary to underlying findings of cirrhosis as of ultrasound abdomen from 09/2022  Follow up on serum osmolality, urine osmolality, lytes, TSH, nephrology follow up     4/18  Na improved, 133   Nephrology following         Ulcer of right lower  extremity with bone involvement without evidence of necrosis  Presented with right stump pain (8/10) and discharge; (gradual worsening of lesion with findings of exposed bone)  MRI as of 4/9- Findings concerning for early osteomyelitis of the stump of the tibia about the wound margin.  Xray tibia/ fibula right- Below the knee amputation changes.  No acute fracture or suspicious osseous lesion.  Anatomical joint alignment.  No definite subcutaneous gas.  Soft tissue wound extending to the osseous margin of the distal tibia.  No definite associated erosive ostia changes.  S/p Application of large external fixation device to tibia (Left, 8/4/2022); Closed reduction of injury of tibia (Left, 8/4/2022); Removal of external fixation device (Left, 9/17/2022); Angiography of lower extremity (N/A, 9/23/2022); Angiography of lower extremity (N/A, 9/29/2022); and Below knee amputation of lower extremity (Left, 10/6/2022 ;    Recently discharged on 04/13 on IV cefepime, p.o. Flagyl for 6 weeks' course, with end of treatment 5/25; status post PICC line placed on 04/11 4/20   wound culture positive for pseudomonas, blood cx fungus   - final culture pending   ID following     4/18  - Vascular following- rec R AKA      Afebrile, no leukocytosis on arrival, no tachycardia, no tachypnea, blood pressure is soft, lactic acid 0.6; received 2 L of IV fluids, antibiotics, blood cultures, vascular consulted; wound care follow up  To continue cefepime, Flagyl, follow up with vascular, NPO except for medications since midnight for any anticipated vascular intervention     4/19  Vascular recommends right AKA, patient requesting 2nd opinion   -continue vanc, cefepime, flagyl and micafungin   -follow wound cultures     4/21  -Plan for R AKA early next week  -continue antibiotic and antifungal per ID recs         Renal transplant, status post  History of renal transplant in 2016   Creatinine 1.9, (creatinine 1.9 at the time of discharge as of  4/13)  Avoid nephrotoxins,  Renal dose of medications   CellCept held due to underlying infection, ordered Prograf 1 mg q.a.m., 0.5 mg q.h.s. as recommended by Nephrology during most recent admission   Prograf level in a.m.   Nephrology following     Chronic obstructive pulmonary disease  Currently not in acute exacerbation   Nebs p.r.n.      Hyperlipidemia   Resume home dose      Type 2 diabetes mellitus with hyperglycemia, with long-term current use of insulin  Patient's FSGs are on current medication regimen.  Last A1c reviewed-   Lab Results   Component Value Date    HGBA1C 6.7 (H) 04/05/2023     Most recent fingerstick glucose reviewed-   Recent Labs   Lab 04/17/23  2056 04/18/23  0429 04/18/23  1145   POCTGLUCOSE 188* 118* 106     Current correctional scale   anti-hyperglycemic dose as follows-   Antihyperglycemics (From admission, onward)    Start     Stop Route Frequency Ordered    04/18/23 2100  insulin detemir U-100 (Levemir) pen 12 Units         -- SubQ 2 times daily 04/17/23 1643    04/17/23 1749  insulin aspart U-100 pen 0-5 Units         -- SubQ Before meals & nightly PRN 04/17/23 1649        Hold Oral hypoglycemics while patient is in the hospital.    Glucose POC, sliding scale insulin, hypoglycemia protocol   At home- on Levemir 12 units b.i.d., sliding scale insulin   Ordered 12 units tonight, hold morning dose for NPO status for any anticipated vascular interventions, ;     Essential hypertension  Blood pressure 90s over 50s at the time of admission, hold home medications including amlodipine, Coreg   Monitor and resume medications accordingly        VTE Risk Mitigation (From admission, onward)         Ordered     IP VTE HIGH RISK PATIENT  Once         04/17/23 1649     Place sequential compression device  Until discontinued         04/17/23 1649                Discharge Planning   LISETH:  4/26/23    Code Status: Full Code   Is the patient medically ready for discharge?:     Reason for patient still  in hospital (select all that apply): Patient trending condition, Treatment, Consult recommendations and Pending disposition  Discharge Plan A: Home Health   Discharge Delays: None known at this time              Sherita Christensen NP  Department of Hospital Medicine   [facility]

## 2023-04-21 NOTE — SUBJECTIVE & OBJECTIVE
Interval History:   67 year old man with right BKA stump infection- cultures-  Pseudomonas -04/18  Blood culture -04/17- Candida Glabrata     Review of Systems   Constitutional:  Negative for activity change, appetite change, chills, diaphoresis, fatigue and fever.   HENT:  Negative for congestion and dental problem.    Respiratory:  Negative for apnea and chest tightness.    Cardiovascular:  Negative for chest pain and leg swelling.   Gastrointestinal:  Negative for abdominal distention.   Neurological:  Negative for dizziness and facial asymmetry.   Hematological:  Negative for adenopathy. Does not bruise/bleed easily.   Objective:     Vital Signs (Most Recent):  Temp: 98 °F (36.7 °C) (04/21/23 0430)  Pulse: 79 (04/21/23 0430)  Resp: 18 (04/21/23 0430)  BP: 117/66 (04/21/23 0430)  SpO2: 97 % (04/21/23 0430) Vital Signs (24h Range):  Temp:  [97.7 °F (36.5 °C)-98.3 °F (36.8 °C)] 98 °F (36.7 °C)  Pulse:  [78-94] 79  Resp:  [12-19] 18  SpO2:  [96 %-97 %] 97 %  BP: (101-140)/(54-70) 117/66     Weight: 78.5 kg (173 lb 1 oz)  Body mass index is 24.14 kg/m².    Estimated Creatinine Clearance: 37.1 mL/min (A) (based on SCr of 2 mg/dL (H)).    Physical Exam  Vitals and nursing note reviewed.   Constitutional:       Appearance: He is normal weight.   HENT:      Head: Normocephalic.   Cardiovascular:      Rate and Rhythm: Tachycardia present.   Musculoskeletal:         General: Normal range of motion.      Cervical back: Normal range of motion.      Comments: Dressing noted   Neurological:      Mental Status: He is alert.       Significant Labs: Blood Culture:   Recent Labs   Lab 04/05/23  1757 04/05/23  1945 04/17/23  1347   LABBLOO Gram stain susan bottle: Gram negative rods, Gram positive cocci  Results called to and read back by: Ashley Nails RN 04/06/2023  14:48  Gram stain aer bottle: Gram positive cocci and Gram negative rods  Positive results previously called 04/06/2023  STREPTOCOCCUS AGALACTIAE (GROUP  B)  Beta-hemolytic streptococci are routinely susceptible to   penicillins,cephalosporins and carbapenems.  *  PROTEUS MIRABILIS* Gram stain susan bottle: Gram negative rods  Results called to and read back by: Ashley Nails RN 04/06/2023 14:48  Gram stain aer bottle: Gram negative rods  Positive results previously called 04/07/2023  20:26  PROTEUS MIRABILIS  For susceptibility see order #[id number]  * No Growth to date  No Growth to date  No Growth to date  No Growth to date  Gram stain susan bottle: yeast  Results called to and read back by:Sebastien Rowley LPN 04/19/2023  09:50  MCKAY GLABRATA  ID consult required at [facility].  *     BMP:   Recent Labs   Lab 04/20/23  0446   *   *   K 4.7   *   CO2 15*   BUN 31*   CREATININE 2.0*   CALCIUM 7.7*     CBC:   Recent Labs   Lab 04/20/23  0446   WBC 5.96   HGB 8.4*   HCT 28.6*        Wound Culture:   Recent Labs   Lab 04/06/23  1707 04/18/23  1310   LABAERO PSEUDOMONAS AERUGINOSA  Moderate  *  PROVIDENCIA STUARTII  Moderate  Skin skylar also present  * PRESUMPTIVE PSEUDOMONAS SPECIES  Moderate  Identification and susceptibility pending  *     All pertinent labs within the past 24 hours have been reviewed.    Significant Imaging: I have reviewed all pertinent imaging results/findings within the past 24 hours.

## 2023-04-21 NOTE — CONSULTS
Pharmacokinetic Assessment Sign Off: IV Vancomycin     Therapy with Vancomycin complete and/or consult discontinued by provider.  Pharmacy will sign off, please re-consult as needed.     Thank you for allowing us to participate in this patient's care.      Angie Phipps, Atul 04/21/2023 6:35 PM

## 2023-04-22 NOTE — PLAN OF CARE
A544/A544 LINDSEYShayna Ladd is a 67 y.o.male admitted on 2023 for Fungemia   Code Status: Full Code MRN: [id number]   Review of patient's allergies indicates:  No Known Allergies  Past Medical History:   Diagnosis Date    DINORAH (acute kidney injury) 2016    Arthritis     CAD in native artery 2019    CHF (congestive heart failure)     Chronic obstructive pulmonary disease 2016    Coronary artery disease involving native coronary artery of native heart without angina pectoris 2016    CRI (chronic renal insufficiency) 2019     donor kidney transplant for DM 16     Induction with Thymo x3 and IV solumedrol to total 875mg  Kidney Biopsy  2016: 16 glomeruli, ACR type 1 AVR type 2, significant microcirculatory changes, c4d negative, No DSA, 5 to10% fibrosis. Treated with thymo x8 2016- no rejection      Diastolic heart failure     Encounter for blood transfusion     ESRD on RRT since 10/2013 10/29/2013    Biopsy proven diabetic nephropathy and lymphoplasmacytic interstitial infiltrate not c/w with AIN (ddx sjogrens or assoc with tamm-horsefall protein extravasation)     GERD (gastroesophageal reflux disease)     History of hepatitis C, s/p successful Rx w/ SVR12 - 2017    Completed 12 weeks harvoni w/ SVR    Hyperlipidemia     Hypertension     PAD (peripheral artery disease) 2019    PIC line (peripherally inserted central catheter) flush     Prophylactic immunotherapy     Proteinuria     PVD (peripheral vascular disease) 2017    RLE BKA CT 16 Extensive atherosclerotic disease of the aorta and mesenteric arteries.     Renal hypertension     Type 2 diabetes mellitus with diabetic neuropathy, with long-term current use of insulin 2016    Vitamin B12 deficiency       PRN meds    sodium chloride 0.9%, , PRN  acetaminophen, 500 mg, Q6H PRN  acetaminophen, 500 mg, Q8H PRN  albuterol-ipratropium, 3 mL, Q6H PRN  dextrose 10%, 12.5 g, PRN  dextrose  Rec'd fax from pharmacy requesting a 90 day supply of Trulicity. Ok to give a 90 day supply. New script sent to pharmacy.    10%, 25 g, PRN  dextrose, 15 g, PRN  dextrose, 30 g, PRN  diphenhydrAMINE, 25 mg, Q6H PRN  glucagon (human recombinant), 1 mg, PRN  HYDROcodone-acetaminophen, 1 tablet, Q8H PRN  insulin aspart U-100, 0-5 Units, QID (AC + HS) PRN  melatonin, 6 mg, Nightly PRN  morphine, 4 mg, Q6H PRN  naloxone, 0.02 mg, PRN  ondansetron, 4 mg, Q8H PRN  sodium chloride 0.9%, 10 mL, Q12H PRN      Chart check completed. Will continue plan of care.     Problem: Infection Progression (Sepsis/Septic Shock)  Goal: Absence of Infection Signs and Symptoms  Outcome: Ongoing, Progressing     Problem: Impaired Wound Healing  Goal: Optimal Wound Healing  Outcome: Ongoing, Progressing     Problem: Pain Acute  Goal: Acceptable Pain Control and Functional Ability  Outcome: Ongoing, Progressing           Ronnell Coma Scale Score: 15     Lead Monitored: Lead II Rhythm: normal sinus rhythm    Cardiac/Telemetry Box Number: [id number]  VTE Required Core Measure: Pharmacological prophylaxis initiated/maintained Last Bowel Movement: 04/21/23  Diet diabetic [facility]; 2000 Calorie; Renal, Cardiac (Low Na/Chol)  Voiding Characteristics: voids spontaneously without difficulty  Matthew Score: 13  Fall Risk Score: 16  Accucheck []   Freq?      Lines/Drains/Airways       Drain  Duration                  Hemodialysis AV Fistula 09/14/22 0717 Left upper arm 220 days              Peripheral Intravenous Line  Duration                  Peripheral IV - Single Lumen 04/20/23 0615 20 G Anterior;Right Forearm 2 days

## 2023-04-22 NOTE — PROGRESS NOTES
[facility] Medicine  Progress Note    Patient Name: Lavelle Ladd  MRN: [id number]  Patient Class: IP- Inpatient   Admission Date: 4/17/2023  Length of Stay: 2 days  Attending Physician: Steve Hanna MD  Primary Care Provider: Primary Doctor No        Subjective:     Principal Problem:Fungemia        HPI:  Lavelle Ladd is a 67 y.o. male patient with a PMHx of CAD, CHF, s/p kidney transplant  on 5/21/16, COPD, hyperlipidemia, hypertension, peripheral arterial disease, peripheral vascular disease, Application of large external fixation device to tibia (Left, 8/4/2022); Closed reduction of injury of tibia (Left, 8/4/2022); Removal of external fixation device (Left, 9/17/2022); Angiography of lower extremity (N/A, 9/23/2022); Angiography of lower extremity (N/A, 9/29/2022); and Below knee amputation of lower extremity (Left, 10/6/2022) who presents to the Emergency Department for RLE wound check. Pt reports a wound to his R BKA stump for the past week, but states that it has worsened since this morning.  Was evaluated by home health nurse, and has been recommended to go to ED due to likely findings of exposure of bone on examination.  Stated that Symptoms are constant and moderate in severity. No mitigating or exacerbating factors reported. Associated sxs include RLE pain and intermittent nausea. Pt also reports slurred speech since taking Tramadol for the first time this morning. Patient denies any fever, chills, vomiting, SOB, CP, weakness, numbness, dizziness, headache, and all other sxs at this time.   Stated improvement in diarrhea, stated having 2-3 episodes of formed stools, stated being compliant with medications/antibiotics at home.        Of note patient has been hospitalized from 04/05 to 4/13- with septic shock/  bacteremia , wound infection ,C,diff and osteo ; initially has been admitted to ICU due to requirement of pressors, has been downgraded to floor and subsequently got  discharged on 04/13 on 6 weeks of antibiotics including IV cefepime and p.o. vanc with end of treatment 05/25/2023.    Also patient has been started on p.o. vanc for findings of positive C diff on 04/06/2023 and has been recommended to take for total 10 days of duration with end of treatment 04/16/2023. ID has been consulted;   Status post PICC line placement on 04/11/2023.  Also during recent hospitalization for underlying renal transplant with DINORAH findings with creatinine up to 2.2 on admission, nephrology has been consulted, CellCept has been held due to underlying active infection, nephrology recommended Prograf 1 mg in a.m., 0.5 mg q.h.s. to maintain levels between 4-6.    On arrival to ED, patient remained afebrile, no tachycardia, no tachypnea, blood pressure soft, lactic acid 0.6, received 2 L of IV fluids, blood cultures x2 ordered and antibiotics were started within 4 hours duration.  Vascular surgery consulted;   For the findings of slurred speech on arrival, patient underwent CT head while in ED, showed no acute intracranial findings.      Pt has been   admitted multiple times since 8 /4/22  in multiples facility including  [facility], [facility]  and  [facility] .      Lives alone at home, stated that he gets support from his sister, ;  Code status:  Full code (alert and oriented x3)                      Overview/Hospital Course:  Lavelle Ladd is a 66 yo male, who was recently admitted with septic shock/  bacteremia , wound infection ,C diff colitis and osteomyelitis of R BKA stump s/p 6 weeks antimicrobial therapy. He presented to ED with worsening of R BKA wound with bone exposure and necrosis. Vanc, flagyl and cefepime initiated. Vascular surgery consulted with recommendation for right above the knee amputation. Patient states he would like 2nd opinion.   4/19/23 1 of 2 blood cultures collected on admit positive for yeast. Per ID start Micafungin. Repeat blood cultures in am. Wound  cultures pending. Start bicarb for management of metabolic acidosis per nephrology recommendation.   4/20 wound culture positive for pseudomonas. Continue antibiotic recs per ID. Patient requesting to speak with vascular service regarding amputation- as he has more questions before making final decision Dr. Mcdonald notified.   4/21 patient agrees to R AKA next week. Vascular notified.       Interval History: No acute events overnight. Plan for R AKA early next week. Drowsy, disoriented after nap- taking morphine, norco and benadryl q6h. Norco changed to q8hprn.     Review of Systems   Constitutional:  Negative for chills and fever.   HENT:  Negative for congestion.    Respiratory:  Negative for cough and shortness of breath.    Cardiovascular:  Negative for chest pain.   Gastrointestinal:  Negative for abdominal pain, diarrhea, nausea and vomiting.   Musculoskeletal:  Positive for arthralgias.   Skin:  Positive for wound.   Objective:     Vital Signs (Most Recent):  Temp: 98.4 °F (36.9 °C) (04/22/23 1120)  Pulse: 98 (04/22/23 1335)  Resp: 18 (04/22/23 1527)  BP: (!) 110/53 (04/22/23 1120)  SpO2: (!) 92 % (04/22/23 1121)   Vital Signs (24h Range):  Temp:  [97.5 °F (36.4 °C)-98.4 °F (36.9 °C)] 98.4 °F (36.9 °C)  Pulse:  [] 98  Resp:  [16-20] 18  SpO2:  [90 %-95 %] 92 %  BP: ()/(53-62) 110/53     Weight: 78.5 kg (173 lb 1 oz)  Body mass index is 24.14 kg/m².    Intake/Output Summary (Last 24 hours) at 4/22/2023 1733  Last data filed at 4/22/2023 1730  Gross per 24 hour   Intake 437.72 ml   Output --   Net 437.72 ml        Physical Exam  Vitals and nursing note reviewed.   Constitutional:       General: He is not in acute distress.     Appearance: He is well-developed.   HENT:      Head: Normocephalic and atraumatic.   Eyes:      Conjunctiva/sclera: Conjunctivae normal.      Pupils: Pupils are equal, round, and reactive to light.   Neck:      Vascular: No JVD.   Cardiovascular:      Rate and Rhythm: Normal rate  and regular rhythm.      Heart sounds: Normal heart sounds.   Pulmonary:      Effort: Pulmonary effort is normal. No respiratory distress.      Breath sounds: Normal breath sounds. No wheezing.   Abdominal:      General: Bowel sounds are normal. There is no distension.      Palpations: Abdomen is soft.      Tenderness: There is no abdominal tenderness. There is no guarding.   Musculoskeletal:         General: Tenderness (R BKA) present. Normal range of motion.      Cervical back: Normal range of motion and neck supple.   Skin:     General: Skin is warm and dry.      Capillary Refill: Capillary refill takes less than 2 seconds.      Findings: Erythema present.   Neurological:      Mental Status: He is alert and oriented to person, place, and time.   Psychiatric:         Behavior: Behavior normal.       Significant Labs: All pertinent labs within the past 24 hours have been reviewed.  CBC:   Recent Labs   Lab 04/21/23 0823   WBC 6.35   HGB 8.2*   HCT 27.9*          CMP:   Recent Labs   Lab 04/21/23 0823 04/22/23  0937   * 134*   K 4.6 4.9    108   CO2 14* 14*    120*   BUN 39* 43*   CREATININE 2.3* 2.4*   CALCIUM 8.5* 8.9   PROT 6.4  --    ALBUMIN 2.3* 2.4*   BILITOT 0.1  --    ALKPHOS 103  --    AST 32  --    ALT 22  --    ANIONGAP 7* 12             Significant Imaging: I have reviewed all pertinent imaging results/findings within the past 24 hours.      Assessment/Plan:      * Fungemia  -start Micafungin per ID recs   - follow repeat cultures       Metabolic acidosis  Bicarb of 16, anion gap 9, likely secondary to underlying kidney disease   Monitor and consider replacement as needed   Nephrology following     4/19  -resume po bicarb       Hyponatremia  Sodium 129  Baseline sodium runs between 129-133 likely secondary to underlying findings of cirrhosis as of ultrasound abdomen from 09/2022  Follow up on serum osmolality, urine osmolality, lytes, TSH, nephrology follow up     4/18  Na  improved, 133   Nephrology following         Ulcer of right lower extremity with bone involvement without evidence of necrosis  Presented with right stump pain (8/10) and discharge; (gradual worsening of lesion with findings of exposed bone)  MRI as of 4/9- Findings concerning for early osteomyelitis of the stump of the tibia about the wound margin.  Xray tibia/ fibula right- Below the knee amputation changes.  No acute fracture or suspicious osseous lesion.  Anatomical joint alignment.  No definite subcutaneous gas.  Soft tissue wound extending to the osseous margin of the distal tibia.  No definite associated erosive ostia changes.  S/p Application of large external fixation device to tibia (Left, 8/4/2022); Closed reduction of injury of tibia (Left, 8/4/2022); Removal of external fixation device (Left, 9/17/2022); Angiography of lower extremity (N/A, 9/23/2022); Angiography of lower extremity (N/A, 9/29/2022); and Below knee amputation of lower extremity (Left, 10/6/2022 ;    Recently discharged on 04/13 on IV cefepime, p.o. Flagyl for 6 weeks' course, with end of treatment 5/25; status post PICC line placed on 04/11 4/20   wound culture positive for pseudomonas, blood cx fungus   - final culture pending   ID following     4/18  - Vascular following- rec R AKA      Afebrile, no leukocytosis on arrival, no tachycardia, no tachypnea, blood pressure is soft, lactic acid 0.6; received 2 L of IV fluids, antibiotics, blood cultures, vascular consulted; wound care follow up  To continue cefepime, Flagyl, follow up with vascular, NPO except for medications since midnight for any anticipated vascular intervention     4/19  Vascular recommends right AKA, patient requesting 2nd opinion   -continue vanc, cefepime, flagyl and micafungin   -follow wound cultures     4/21  -Plan for R AKA early next week  -continue antibiotic and antifungal per ID recs                   Renal transplant, status post  History of renal  transplant in 2016   Creatinine 1.9, (creatinine 1.9 at the time of discharge as of 4/13)  Avoid nephrotoxins,  Renal dose of medications   CellCept held due to underlying infection, ordered Prograf 1 mg q.a.m., 0.5 mg q.h.s. as recommended by Nephrology during most recent admission   Prograf level in a.m.   Nephrology consult    4/18  Cr 1.7         Chronic obstructive pulmonary disease  Currently not in acute exacerbation   Nebs p.r.n.      Hyperlipidemia   Resume home dose      Type 2 diabetes mellitus with hyperglycemia, with long-term current use of insulin  Patient's FSGs are on current medication regimen.  Last A1c reviewed-   Lab Results   Component Value Date    HGBA1C 6.7 (H) 04/05/2023     Most recent fingerstick glucose reviewed-   Recent Labs   Lab 04/17/23 2056 04/18/23  0429 04/18/23  1145   POCTGLUCOSE 188* 118* 106     Current correctional scale   anti-hyperglycemic dose as follows-   Antihyperglycemics (From admission, onward)    Start     Stop Route Frequency Ordered    04/18/23 2100  insulin detemir U-100 (Levemir) pen 12 Units         -- SubQ 2 times daily 04/17/23 1643    04/17/23 1749  insulin aspart U-100 pen 0-5 Units         -- SubQ Before meals & nightly PRN 04/17/23 1649        Hold Oral hypoglycemics while patient is in the hospital.    Glucose POC, sliding scale insulin, hypoglycemia protocol   At home- on Levemir 12 units b.i.d., sliding scale insulin   Ordered 12 units tonight, hold morning dose for NPO status for any anticipated vascular interventions, ;     Essential hypertension  Blood pressure 90s over 50s at the time of admission, hold home medications including amlodipine, Coreg   Monitor and resume medications accordingly        VTE Risk Mitigation (From admission, onward)         Ordered     IP VTE HIGH RISK PATIENT  Once         04/17/23 1649     Place sequential compression device  Until discontinued         04/17/23 1649                Discharge Planning   LISETH: 4/27/23     Code Status: Full Code   Is the patient medically ready for discharge?:     Reason for patient still in hospital (select all that apply): Patient trending condition, Treatment, Consult recommendations and Pending disposition  Discharge Plan A: Home Health   Discharge Delays: None known at this time              Sherita Christensen NP  Department of Hospital Medicine   [facility]

## 2023-04-22 NOTE — SUBJECTIVE & OBJECTIVE
Interval History: No acute events overnight. Plan for R AKA early next week. Drowsy, disoriented after nap- taking morphine, norco and benadryl q6h. Norco changed to q8hprn.     Review of Systems   Constitutional:  Negative for chills and fever.   HENT:  Negative for congestion.    Respiratory:  Negative for cough and shortness of breath.    Cardiovascular:  Negative for chest pain.   Gastrointestinal:  Negative for abdominal pain, diarrhea, nausea and vomiting.   Musculoskeletal:  Positive for arthralgias.   Skin:  Positive for wound.   Objective:     Vital Signs (Most Recent):  Temp: 98.4 °F (36.9 °C) (04/22/23 1120)  Pulse: 98 (04/22/23 1335)  Resp: 18 (04/22/23 1527)  BP: (!) 110/53 (04/22/23 1120)  SpO2: (!) 92 % (04/22/23 1121)   Vital Signs (24h Range):  Temp:  [97.5 °F (36.4 °C)-98.4 °F (36.9 °C)] 98.4 °F (36.9 °C)  Pulse:  [] 98  Resp:  [16-20] 18  SpO2:  [90 %-95 %] 92 %  BP: ()/(53-62) 110/53     Weight: 78.5 kg (173 lb 1 oz)  Body mass index is 24.14 kg/m².    Intake/Output Summary (Last 24 hours) at 4/22/2023 1733  Last data filed at 4/22/2023 1730  Gross per 24 hour   Intake 437.72 ml   Output --   Net 437.72 ml        Physical Exam  Vitals and nursing note reviewed.   Constitutional:       General: He is not in acute distress.     Appearance: He is well-developed.   HENT:      Head: Normocephalic and atraumatic.   Eyes:      Conjunctiva/sclera: Conjunctivae normal.      Pupils: Pupils are equal, round, and reactive to light.   Neck:      Vascular: No JVD.   Cardiovascular:      Rate and Rhythm: Normal rate and regular rhythm.      Heart sounds: Normal heart sounds.   Pulmonary:      Effort: Pulmonary effort is normal. No respiratory distress.      Breath sounds: Normal breath sounds. No wheezing.   Abdominal:      General: Bowel sounds are normal. There is no distension.      Palpations: Abdomen is soft.      Tenderness: There is no abdominal tenderness. There is no guarding.    Musculoskeletal:         General: Tenderness (R BKA) present. Normal range of motion.      Cervical back: Normal range of motion and neck supple.   Skin:     General: Skin is warm and dry.      Capillary Refill: Capillary refill takes less than 2 seconds.      Findings: Erythema present.   Neurological:      Mental Status: He is alert and oriented to person, place, and time.   Psychiatric:         Behavior: Behavior normal.       Significant Labs: All pertinent labs within the past 24 hours have been reviewed.  CBC:   Recent Labs   Lab 04/21/23  0823   WBC 6.35   HGB 8.2*   HCT 27.9*          CMP:   Recent Labs   Lab 04/21/23 0823 04/22/23  0937   * 134*   K 4.6 4.9    108   CO2 14* 14*    120*   BUN 39* 43*   CREATININE 2.3* 2.4*   CALCIUM 8.5* 8.9   PROT 6.4  --    ALBUMIN 2.3* 2.4*   BILITOT 0.1  --    ALKPHOS 103  --    AST 32  --    ALT 22  --    ANIONGAP 7* 12             Significant Imaging: I have reviewed all pertinent imaging results/findings within the past 24 hours.

## 2023-04-22 NOTE — PROGRESS NOTES
[facility]  Nephrology  Progress Note    Patient Name: Lavelle Ladd  MRN: [id number]  Admission Date: 4/17/2023  Hospital Length of Stay: 2 days  Attending Provider: Steve Hanna MD   Primary Care Physician: Primary Doctor No  Principal Problem:Fungemia  Primary Nephrologist: [facility]     Subjective:     Interval History:   68 yo male with ESRD s/p kidney txp with baseline sCr around 2mg/dL admitted with old R BKA and bone exposure. Has fungemia and pseudomonas wound infection    4/21  - he is considering AKA but would like to make final decision on Monday  - good appetite     4/22  - just awakening form a nap and a little disoriented--receiving  prn morphine, hydrocodone and Benadryl [id number]    Review of patient's allergies indicates:  No Known Allergies  Current Facility-Administered Medications   Medication Frequency    0.9%  NaCl infusion PRN    acetaminophen tablet 500 mg Q6H PRN    acetaminophen tablet 500 mg Q8H PRN    albuterol-ipratropium 2.5 mg-0.5 mg/3 mL nebulizer solution 3 mL Q6H PRN    atorvastatin tablet 80 mg Daily    cholecalciferol (vitamin D3) 125 mcg (5,000 unit) tablet 5,000 Units Every Mon    dextrose 10% bolus 125 mL 125 mL PRN    dextrose 10% bolus 250 mL 250 mL PRN    dextrose 40 % gel 15,000 mg PRN    dextrose 40 % gel 30,000 mg PRN    diphenhydrAMINE capsule 25 mg Q6H PRN    ferrous sulfate tablet 1 each BID    gabapentin capsule 300 mg BID    glucagon (human recombinant) injection 1 mg PRN    HYDROcodone-acetaminophen  mg per tablet 1 tablet Q6H PRN    insulin aspart U-100 pen 0-5 Units QID (AC + HS) PRN    insulin detemir U-100 (Levemir) pen 12 Units BID    levothyroxine tablet 125 mcg Daily    melatonin tablet 6 mg Nightly PRN    meropenem-0.9% sodium chloride 500 mg/50 mL IVPB Q8H    micafungin 100 mg in sodium chloride 0.9 % 100 mL IVPB (MB+) Q24H    miconazole NITRATE 2 % top powder BID    morphine injection 4 mg Q6H PRN    multivitamin tablet Daily     naloxone 0.4 mg/mL injection 0.02 mg PRN    ondansetron disintegrating tablet 4 mg Q8H PRN    sodium bicarbonate tablet 650 mg TID    sodium chloride 0.9% flush 10 mL Q12H PRN    tacrolimus capsule 0.5 mg Daily PM    tacrolimus capsule 1 mg Daily AM       Objective:     Vital Signs (Most Recent):  Temp: 98.4 °F (36.9 °C) (04/22/23 1120)  Pulse: 98 (04/22/23 1335)  Resp: 18 (04/22/23 1527)  BP: (!) 110/53 (04/22/23 1120)  SpO2: (!) 92 % (04/22/23 1121)   Vital Signs (24h Range):  Temp:  [97.5 °F (36.4 °C)-98.6 °F (37 °C)] 98.4 °F (36.9 °C)  Pulse:  [] 98  Resp:  [16-20] 18  SpO2:  [90 %-97 %] 92 %  BP: ()/(53-62) 110/53     Weight: 78.5 kg (173 lb 1 oz) (04/21/23 1617)  Body mass index is 24.14 kg/m².  Body surface area is 1.98 meters squared.    I/O last 3 completed shifts:  In: 360 [P.O.:360]  Out: 500 [Urine:500]    Physical Exam  Vitals and nursing note reviewed.   Constitutional:       Appearance: He is ill-appearing. He is not toxic-appearing.   HENT:      Head: Atraumatic.   Cardiovascular:      Rate and Rhythm: Normal rate.   Pulmonary:      Effort: Pulmonary effort is normal. No respiratory distress.   Abdominal:      General: There is no distension.   Neurological:      Mental Status: He is alert and oriented to person, place, and time.       Significant Labs: reviewed         Assessment/Plan:     Active Diagnoses:    Diagnosis Date Noted POA    PRINCIPAL PROBLEM:  Fungemia [B49] 04/19/2023 Unknown    Ulcer of right lower extremity with bone involvement without evidence of necrosis [L97.916] 04/17/2023 Yes    Hyponatremia [E87.1] 04/17/2023 Yes    Metabolic acidosis [E87.20] 04/17/2023 Yes    Renal transplant, status post [Z94.0] 11/30/2016 Not Applicable    Chronic obstructive pulmonary disease [J44.9] 09/12/2016 Yes    Hyperlipidemia [E78.5] 07/01/2016 Yes    Type 2 diabetes mellitus with hyperglycemia, with long-term current use of insulin [E11.65, Z79.4]  Not Applicable    Essential  hypertension [I10] 02/20/2015 Yes    GERD (gastroesophageal reflux disease) [K21.9] 10/12/2013 Yes      Problems Resolved During this Admission:     DINORAH on CKD in setting of sepsis   - sCr increased again, check Prograf level and add midodrine for BP support as need to ensure renal perfusion  - continue supportive care as doing    ESRD s/p kidney txp 5/2016  - continue Prograf 1/0.5mg and continue to hold MMF  - check prograf tomorrow     Metabolic Acidosis  - continue po bicarb     Hyponatremia  - monitor, no indication for treatment     ID  - currently infected old R BKA plus fungemia  - recent Proteus sepsis and C diff    Hx of HTN  - relatively hypotensive currently, c/w sepsis and opioid administration         Gianni Masterson MD  Nephrology

## 2023-04-22 NOTE — PT/OT/SLP PROGRESS
Occupational Therapy  Treatment    Lavelle Ladd   MRN: [id number]   Admitting Diagnosis: Fungemia    OT Date of Treatment: 04/21/23   OT Start Time: 0935  OT Stop Time: 1000  OT Total Time (min): 25 min    Billable Minutes:  Therapeutic Activity 15  and Therapeutic Exercise 10    OT/MIGUEL: OT          General Precautions: Standard, fall  Orthopedic Precautions: N/A  Braces: N/A  Respiratory Status: Room air         Subjective:  Communicated with NURSE  AND Epic CHART REVIEW prior to session.    Pain/Comfort  Pain Rating 1: 5/10    Objective:  Patient found with: peripheral IV, telemetry     Functional Mobility:  Bed Mobility:   SBA WITH ROLLING L<>R   SBA WITH FORWARD SEATED SCOOTING  SBA WITH BACK LATERALSCOOTING AS WELL AS  SUPINE SCOOTING    Transfers: Max a x 2 with sit<> stand t/f's x 2 trials  max a x staic stance x 30 seconds to 1 minute      Activities of Daily Living:     Feed MAX A X 2 WITH SIT<>STAND X 2 TRAILS. PT WAS UNABLE TO RELEASE WALKER ONCE STANDING AND PLACE HANDS ing adaptive equipment:       LE SBA LORENZO PROSTHETIC               Balance:   Static Sit: FAIR+: Able to take MINIMAL challenges from all directions  Dynamic Sit: FAIR+: Maintains balance through MINIMAL excursions of active trunk motion  Static Stand: 0: Needs MAXIMAL assist to maintain   Dynamic stand: 0: N/A    Therapeutic Activities and Exercises:  PT REQ MAX ENCOURAGEMENT TO PARTICIPATE WITH THERAPY SESSION . PT IS FEARFUL. PT EDUCATED MIN HEP . PT VERBALIZED UNDERSTANDING AND RETURNED DEMONSRATION. PT PERFORMED 1 SET X 15 REPS B UE ROM EXERCISE WITH MIN RESISTANCE     AM-PAC 6 CLICK ADL   How much help from another person does this patient currently need?   1 = Unable, Total/Dependent Assistance  2 = A lot, Maximum/Moderate Assistance  3 = A little, Minimum/Contact Guard/Supervision  4 = None, Modified [place]/Independent    Putting on and taking off regular lower body clothing? : 3  Bathing (including washing, rinsing,  "drying)?: 3  Toileting, which includes using toilet, bedpan, or urinal? : 3  Putting on and taking off regular upper body clothing?: 3  Taking care of personal grooming such as brushing teeth?: 3  Eating meals?: 4  Daily Activity Total Score: 19     AM-PAC Raw Score CMS "G-Code Modifier Level of Impairment Assistance   6 % Total / Unable   7 - 8 CM 80 - 100% Maximal Assist   9-13 CL 60 - 80% Moderate Assist   14 - 19 CK 40 - 60% Moderate Assist   20 - 22 CJ 20 - 40% Minimal Assist   23 CI 1-20% SBA / CGA   24 CH 0% Independent/ Mod I       Patient left HOB elevated with all lines intact, call button in reach, bed alarm on, and NURSE notified    ASSESSMENT:  Lavelle Ladd is a 67 y.o. male with a medical diagnosis of Fungemia and presents with DEBILITY AND GENERALIZED WEAKNESS.    Rehab identified problem list/impairments:  weakness, impaired functional mobility, impaired balance, impaired endurance, impaired self care skills, gait instability, decreased coordination, decreased upper extremity function, decreased lower extremity function, decreased safety awareness, pain    Rehab potential is good.    Activity tolerance: Good    Discharge recommendations: nursing facility, skilled   Barriers to discharge:      Equipment recommendations: to be determined by next level of care    GOALS:   Multidisciplinary Problems       Occupational Therapy Goals          Problem: Occupational Therapy    Goal Priority Disciplines Outcome Interventions   Occupational Therapy Goal     OT, PT/OT Ongoing, Progressing    Description: Goals to be met by: 5/2/23     Patient will increase functional independence with ADLs by performing:    Toileting from bedside commode with Supervision for hygiene and clothing management.   Stand pivot transfers with Modified [place] using LLE prosthetic and RW.  Upper extremity exercise program x20 reps per handout, with independence to maintain BUE strength.                   "       Plan:  Patient to be seen 2 x/week to address the above listed problems via self-care/home management, therapeutic activities, therapeutic exercises  Plan of Care expires: 05/02/23  Plan of Care reviewed with: patient         04/21/2023

## 2023-04-23 NOTE — PROGRESS NOTES
[facility] Medicine  Progress Note    Patient Name: Lavelle Ladd  MRN: [id number]  Patient Class: IP- Inpatient   Admission Date: 4/17/2023  Length of Stay: 3 days  Attending Physician: Steve Hanna MD  Primary Care Provider: Primary Doctor No        Subjective:     Principal Problem:Fungemia        HPI:  Lavelle Ladd is a 67 y.o. male patient with a PMHx of CAD, CHF, s/p kidney transplant  on 5/21/16, COPD, hyperlipidemia, hypertension, peripheral arterial disease, peripheral vascular disease, Application of large external fixation device to tibia (Left, 8/4/2022); Closed reduction of injury of tibia (Left, 8/4/2022); Removal of external fixation device (Left, 9/17/2022); Angiography of lower extremity (N/A, 9/23/2022); Angiography of lower extremity (N/A, 9/29/2022); and Below knee amputation of lower extremity (Left, 10/6/2022) who presents to the Emergency Department for RLE wound check. Pt reports a wound to his R BKA stump for the past week, but states that it has worsened since this morning.  Was evaluated by home health nurse, and has been recommended to go to ED due to likely findings of exposure of bone on examination.  Stated that Symptoms are constant and moderate in severity. No mitigating or exacerbating factors reported. Associated sxs include RLE pain and intermittent nausea. Pt also reports slurred speech since taking Tramadol for the first time this morning. Patient denies any fever, chills, vomiting, SOB, CP, weakness, numbness, dizziness, headache, and all other sxs at this time.   Stated improvement in diarrhea, stated having 2-3 episodes of formed stools, stated being compliant with medications/antibiotics at home.        Of note patient has been hospitalized from 04/05 to 4/13- with septic shock/  bacteremia , wound infection ,C,diff and osteo ; initially has been admitted to ICU due to requirement of pressors, has been downgraded to floor and subsequently got  discharged on 04/13 on 6 weeks of antibiotics including IV cefepime and p.o. vanc with end of treatment 05/25/2023.    Also patient has been started on p.o. vanc for findings of positive C diff on 04/06/2023 and has been recommended to take for total 10 days of duration with end of treatment 04/16/2023. ID has been consulted;   Status post PICC line placement on 04/11/2023.  Also during recent hospitalization for underlying renal transplant with DINORAH findings with creatinine up to 2.2 on admission, nephrology has been consulted, CellCept has been held due to underlying active infection, nephrology recommended Prograf 1 mg in a.m., 0.5 mg q.h.s. to maintain levels between 4-6.    On arrival to ED, patient remained afebrile, no tachycardia, no tachypnea, blood pressure soft, lactic acid 0.6, received 2 L of IV fluids, blood cultures x2 ordered and antibiotics were started within 4 hours duration.  Vascular surgery consulted;   For the findings of slurred speech on arrival, patient underwent CT head while in ED, showed no acute intracranial findings.      Pt has been   admitted multiple times since 8 /4/22  in multiples facility including  [facility], [facility]  and  [facility] .      Lives alone at home, stated that he gets support from his sister, ;  Code status:  Full code (alert and oriented x3)                      Overview/Hospital Course:  Lavelle Ladd is a 68 yo male, who was admitted (4/5/23-4/13/23) with septic shock/  bacteremia , wound infection ,C diff colitis and osteomyelitis of R BKA stump s/p 6 weeks antimicrobial therapy. He presented to ED with worsening of R BKA wound with bone exposure and necrosis. Vanc, flagyl and cefepime initiated. Vascular surgery consulted with recommendation for right above the knee amputation. Patient states he would like 2nd opinion.   4/19/23 1 of 2 blood cultures collected on admit positive for yeast. Per ID start Micafungin. Repeat blood cultures in am.  Wound cultures pending. Start bicarb for management of metabolic acidosis per nephrology recommendation.   4/20 wound culture positive for pseudomonas. Continue antibiotic recs per ID. Patient requesting to speak with vascular service regarding amputation- as he has more questions before making final decision Dr. Mcdonald notified.   4/21 patient agrees to R AKA next week. Vascular notified.      Interval History: No acute events overnight. Plan for R AKA pending OR availability this week.     Review of Systems   Constitutional:  Negative for chills and fever.   HENT:  Negative for congestion.    Respiratory:  Negative for cough and shortness of breath.    Cardiovascular:  Negative for chest pain.   Gastrointestinal:  Negative for abdominal pain, diarrhea, nausea and vomiting.   Musculoskeletal:  Positive for arthralgias.   Skin:  Positive for wound.   Objective:     Vital Signs (Most Recent):  Temp: 98.8 °F (37.1 °C) (04/23/23 1129)  Pulse: 94 (04/23/23 1129)  Resp: 18 (04/23/23 1156)  BP: (!) 95/55 (04/23/23 1129)  SpO2: (!) 93 % (04/23/23 1129)   Vital Signs (24h Range):  Temp:  [97.6 °F (36.4 °C)-98.8 °F (37.1 °C)] 98.8 °F (37.1 °C)  Pulse:  [83-97] 94  Resp:  [18] 18  SpO2:  [90 %-93 %] 93 %  BP: ()/(52-96) 95/55     Weight: 78.5 kg (173 lb 1 oz)  Body mass index is 24.14 kg/m².    Intake/Output Summary (Last 24 hours) at 4/23/2023 1433  Last data filed at 4/23/2023 0822  Gross per 24 hour   Intake 607.91 ml   Output 750 ml   Net -142.09 ml        Physical Exam  Vitals and nursing note reviewed.   Constitutional:       General: He is not in acute distress.     Appearance: He is well-developed.   HENT:      Head: Normocephalic and atraumatic.   Eyes:      Conjunctiva/sclera: Conjunctivae normal.      Pupils: Pupils are equal, round, and reactive to light.   Neck:      Vascular: No JVD.   Cardiovascular:      Rate and Rhythm: Normal rate and regular rhythm.      Heart sounds: Normal heart sounds.   Pulmonary:       Effort: Pulmonary effort is normal. No respiratory distress.      Breath sounds: Normal breath sounds. No wheezing.   Abdominal:      General: Bowel sounds are normal. There is no distension.      Palpations: Abdomen is soft.      Tenderness: There is no abdominal tenderness. There is no guarding.   Musculoskeletal:         General: Tenderness (R BKA) present. Normal range of motion.      Cervical back: Normal range of motion and neck supple.   Skin:     General: Skin is warm and dry.      Capillary Refill: Capillary refill takes less than 2 seconds.      Findings: Erythema present.   Neurological:      Mental Status: He is alert and oriented to person, place, and time.   Psychiatric:         Behavior: Behavior normal.       Significant Labs: All pertinent labs within the past 24 hours have been reviewed.  CBC:   Recent Labs   Lab 04/23/23  0521   WBC 13.83*   HGB 8.5*   HCT 28.7*          CMP:   Recent Labs   Lab 04/22/23  0937 04/23/23  0521   * 133*   K 4.9 4.5    112*   CO2 14* 14*   * 67*   BUN 43* 39*   CREATININE 2.4* 2.5*   CALCIUM 8.9 8.9   ALBUMIN 2.4*  --    ANIONGAP 12 7*             Significant Imaging: I have reviewed all pertinent imaging results/findings within the past 24 hours.      Assessment/Plan:      * Fungemia  -start Micafungin per ID recs   - follow repeat cultures     4/23/23  Repeat cultures NGTD       Metabolic acidosis  Bicarb of 16, anion gap 9, likely secondary to underlying kidney disease   Monitor and consider replacement as needed   Nephrology following     4/19  -resume po bicarb       Hyponatremia  Sodium 129  Baseline sodium runs between 129-133 likely secondary to underlying findings of cirrhosis as of ultrasound abdomen from 09/2022  Follow up on serum osmolality, urine osmolality, lytes, TSH, nephrology follow up     4/18  Na improved, 133   Nephrology following         Ulcer of right lower extremity with bone involvement without evidence of  necrosis  Presented with right stump pain (8/10) and discharge; (gradual worsening of lesion with findings of exposed bone)  MRI as of 4/9- Findings concerning for early osteomyelitis of the stump of the tibia about the wound margin.  Xray tibia/ fibula right- Below the knee amputation changes.  No acute fracture or suspicious osseous lesion.  Anatomical joint alignment.  No definite subcutaneous gas.  Soft tissue wound extending to the osseous margin of the distal tibia.  No definite associated erosive ostia changes.  S/p Application of large external fixation device to tibia (Left, 8/4/2022); Closed reduction of injury of tibia (Left, 8/4/2022); Removal of external fixation device (Left, 9/17/2022); Angiography of lower extremity (N/A, 9/23/2022); Angiography of lower extremity (N/A, 9/29/2022); and Below knee amputation of lower extremity (Left, 10/6/2022 ;    Recently discharged on 04/13 on IV cefepime, p.o. Flagyl for 6 weeks' course, with end of treatment 5/25; status post PICC line placed on 04/11 4/20   wound culture positive for pseudomonas, blood cx fungus   - final culture pending   ID following     4/18  - Vascular following- rec R AKA      Afebrile, no leukocytosis on arrival, no tachycardia, no tachypnea, blood pressure is soft, lactic acid 0.6; received 2 L of IV fluids, antibiotics, blood cultures, vascular consulted; wound care follow up  To continue cefepime, Flagyl, follow up with vascular, NPO except for medications since midnight for any anticipated vascular intervention     4/19  Vascular recommends right AKA, patient requesting 2nd opinion   -continue vanc, cefepime, flagyl and micafungin   -follow wound cultures     4/21  -Plan for R AKA early next week  -continue antibiotic and antifungal per ID recs                   Renal transplant, status post  History of renal transplant in 2016   Creatinine 1.9, (creatinine 1.9 at the time of discharge as of 4/13)  Avoid nephrotoxins,  Renal dose of  medications   CellCept held due to underlying infection, ordered Prograf 1 mg q.a.m., 0.5 mg q.h.s. as recommended by Nephrology during most recent admission   Prograf level in a.m.   Nephrology following         Chronic obstructive pulmonary disease  Currently not in acute exacerbation   Nebs p.r.n.      Hyperlipidemia   Resume home dose      Type 2 diabetes mellitus with hyperglycemia, with long-term current use of insulin  Patient's FSGs are on current medication regimen.  Last A1c reviewed-   Lab Results   Component Value Date    HGBA1C 6.7 (H) 04/05/2023     Most recent fingerstick glucose reviewed-   Recent Labs   Lab 04/22/23  1548 04/22/23  1943 04/23/23  0539 04/23/23  1129   POCTGLUCOSE 93 115* 69* 173*     Current correctional scale   anti-hyperglycemic dose as follows-   Antihyperglycemics (From admission, onward)    Start     Stop Route Frequency Ordered    04/18/23 2100  insulin detemir U-100 (Levemir) pen 12 Units         -- SubQ 2 times daily 04/17/23 1643    04/17/23 1749  insulin aspart U-100 pen 0-5 Units         -- SubQ Before meals & nightly PRN 04/17/23 1649        Hold Oral hypoglycemics while patient is in the hospital.    Glucose POC, sliding scale insulin, hypoglycemia protocol   At home- on Levemir 12 units b.i.d., sliding scale insulin   Ordered 12 units tonight, hold morning dose for NPO status for any anticipated vascular interventions, ;     Essential hypertension  Blood pressure 90s over 50s at the time of admission, hold home medications including amlodipine, Coreg   Monitor and resume medications accordingly        VTE Risk Mitigation (From admission, onward)         Ordered     IP VTE HIGH RISK PATIENT  Once         04/17/23 1649     Place sequential compression device  Until discontinued         04/17/23 1649                Discharge Planning   LISETH: 4/27/23    Code Status: Full Code   Is the patient medically ready for discharge?:     Reason for patient still in hospital (select  all that apply): Patient trending condition, Treatment, Consult recommendations and Pending disposition  Discharge Plan A: Home Health   Discharge Delays: None known at this time              Sherita Christensen NP  Department of Hospital Medicine   [facility]

## 2023-04-23 NOTE — PLAN OF CARE
Problem: Adult Inpatient Plan of Care  Goal: Plan of Care Review  Outcome: Ongoing, Progressing  Goal: Patient-Specific Goal (Individualized)  Outcome: Ongoing, Progressing  Goal: Absence of Hospital-Acquired Illness or Injury  Outcome: Ongoing, Progressing  Goal: Optimal Comfort and Wellbeing  Outcome: Ongoing, Progressing  Goal: Readiness for Transition of Care  Outcome: Ongoing, Progressing     Problem: Diabetes Comorbidity  Goal: Blood Glucose Level Within Targeted Range  Outcome: Ongoing, Progressing     Problem: Adjustment to Illness (Sepsis/Septic Shock)  Goal: Optimal Coping  Outcome: Ongoing, Progressing     Problem: Bleeding (Sepsis/Septic Shock)  Goal: Absence of Bleeding  Outcome: Ongoing, Progressing     Problem: Glycemic Control Impaired (Sepsis/Septic Shock)  Goal: Blood Glucose Level Within Desired Range  Outcome: Ongoing, Progressing     Problem: Infection Progression (Sepsis/Septic Shock)  Goal: Absence of Infection Signs and Symptoms  Outcome: Ongoing, Progressing     Problem: Nutrition Impaired (Sepsis/Septic Shock)  Goal: Optimal Nutrition Intake  Outcome: Ongoing, Progressing     Problem: Fluid Imbalance (Pneumonia)  Goal: Fluid Balance  Outcome: Ongoing, Progressing     Problem: Infection (Pneumonia)  Goal: Resolution of Infection Signs and Symptoms  Outcome: Ongoing, Progressing     Problem: Respiratory Compromise (Pneumonia)  Goal: Effective Oxygenation and Ventilation  Outcome: Ongoing, Progressing     Problem: Infection  Goal: Absence of Infection Signs and Symptoms  Outcome: Ongoing, Progressing     Problem: Impaired Wound Healing  Goal: Optimal Wound Healing  Outcome: Ongoing, Progressing     Problem: Skin Injury Risk Increased  Goal: Skin Health and Integrity  Outcome: Ongoing, Progressing     Problem: Pain Acute  Goal: Acceptable Pain Control and Functional Ability  Outcome: Ongoing, Progressing

## 2023-04-23 NOTE — ASSESSMENT & PLAN NOTE
History of renal transplant in 2016   Creatinine 1.9, (creatinine 1.9 at the time of discharge as of 4/13)  Avoid nephrotoxins,  Renal dose of medications   CellCept held due to underlying infection, ordered Prograf 1 mg q.a.m., 0.5 mg q.h.s. as recommended by Nephrology during most recent admission   Prograf level in a.m.   Nephrology following

## 2023-04-23 NOTE — SUBJECTIVE & OBJECTIVE
Interval History: No acute events overnight. Plan for R AKA pending OR availability this week.     Review of Systems   Constitutional:  Negative for chills and fever.   HENT:  Negative for congestion.    Respiratory:  Negative for cough and shortness of breath.    Cardiovascular:  Negative for chest pain.   Gastrointestinal:  Negative for abdominal pain, diarrhea, nausea and vomiting.   Musculoskeletal:  Positive for arthralgias.   Skin:  Positive for wound.   Objective:     Vital Signs (Most Recent):  Temp: 98.8 °F (37.1 °C) (04/23/23 1129)  Pulse: 94 (04/23/23 1129)  Resp: 18 (04/23/23 1156)  BP: (!) 95/55 (04/23/23 1129)  SpO2: (!) 93 % (04/23/23 1129)   Vital Signs (24h Range):  Temp:  [97.6 °F (36.4 °C)-98.8 °F (37.1 °C)] 98.8 °F (37.1 °C)  Pulse:  [83-97] 94  Resp:  [18] 18  SpO2:  [90 %-93 %] 93 %  BP: ()/(52-96) 95/55     Weight: 78.5 kg (173 lb 1 oz)  Body mass index is 24.14 kg/m².    Intake/Output Summary (Last 24 hours) at 4/23/2023 1433  Last data filed at 4/23/2023 0822  Gross per 24 hour   Intake 607.91 ml   Output 750 ml   Net -142.09 ml        Physical Exam  Vitals and nursing note reviewed.   Constitutional:       General: He is not in acute distress.     Appearance: He is well-developed.   HENT:      Head: Normocephalic and atraumatic.   Eyes:      Conjunctiva/sclera: Conjunctivae normal.      Pupils: Pupils are equal, round, and reactive to light.   Neck:      Vascular: No JVD.   Cardiovascular:      Rate and Rhythm: Normal rate and regular rhythm.      Heart sounds: Normal heart sounds.   Pulmonary:      Effort: Pulmonary effort is normal. No respiratory distress.      Breath sounds: Normal breath sounds. No wheezing.   Abdominal:      General: Bowel sounds are normal. There is no distension.      Palpations: Abdomen is soft.      Tenderness: There is no abdominal tenderness. There is no guarding.   Musculoskeletal:         General: Tenderness (R BKA) present. Normal range of motion.       Cervical back: Normal range of motion and neck supple.   Skin:     General: Skin is warm and dry.      Capillary Refill: Capillary refill takes less than 2 seconds.      Findings: Erythema present.   Neurological:      Mental Status: He is alert and oriented to person, place, and time.   Psychiatric:         Behavior: Behavior normal.       Significant Labs: All pertinent labs within the past 24 hours have been reviewed.  CBC:   Recent Labs   Lab 04/23/23  0521   WBC 13.83*   HGB 8.5*   HCT 28.7*          CMP:   Recent Labs   Lab 04/22/23  0937 04/23/23  0521   * 133*   K 4.9 4.5    112*   CO2 14* 14*   * 67*   BUN 43* 39*   CREATININE 2.4* 2.5*   CALCIUM 8.9 8.9   ALBUMIN 2.4*  --    ANIONGAP 12 7*             Significant Imaging: I have reviewed all pertinent imaging results/findings within the past 24 hours.

## 2023-04-23 NOTE — PROGRESS NOTES
[facility]  Nephrology  Progress Note    Patient Name: Lavelle Ladd  MRN: [id number]  Admission Date: 4/17/2023  Hospital Length of Stay: 3 days  Attending Provider: Steve Hanna MD   Primary Care Physician: Primary Doctor No  Principal Problem:Fungemia  Primary Nephrologist: [facility]     Subjective:     Interval History:   68 yo male with ESRD s/p kidney txp with baseline sCr around 2mg/dL admitted with old R BKA and bone exposure. Has fungemia and pseudomonas wound infection    4/21  - he is considering AKA but would like to make final decision on Monday  - good appetite     4/22  - just awakening form a nap and a little disoriented--receiving  prn morphine, hydrocodone and Benadryl [id number]    4/23  - pt asleep, per chart review has agreed to AKA    Review of patient's allergies indicates:  No Known Allergies  Current Facility-Administered Medications   Medication Frequency    0.9%  NaCl infusion PRN    acetaminophen tablet 500 mg Q6H PRN    acetaminophen tablet 500 mg Q8H PRN    albuterol-ipratropium 2.5 mg-0.5 mg/3 mL nebulizer solution 3 mL Q6H PRN    atorvastatin tablet 80 mg Daily    cholecalciferol (vitamin D3) 125 mcg (5,000 unit) tablet 5,000 Units Every Mon    dextrose 10% bolus 125 mL 125 mL PRN    dextrose 10% bolus 250 mL 250 mL PRN    dextrose 40 % gel 15,000 mg PRN    dextrose 40 % gel 30,000 mg PRN    diphenhydrAMINE capsule 25 mg Q6H PRN    ferrous sulfate tablet 1 each BID    gabapentin capsule 300 mg BID    glucagon (human recombinant) injection 1 mg PRN    HYDROcodone-acetaminophen  mg per tablet 1 tablet Q8H PRN    insulin aspart U-100 pen 0-5 Units QID (AC + HS) PRN    insulin detemir U-100 (Levemir) pen 12 Units BID    levothyroxine tablet 125 mcg Daily    melatonin tablet 6 mg Nightly PRN    meropenem-0.9% sodium chloride 500 mg/50 mL IVPB Q8H    micafungin 100 mg in sodium chloride 0.9 % 100 mL IVPB (MB+) Q24H    miconazole NITRATE 2 % top powder BID    midodrine  tablet 2.5 mg Q8H    morphine injection 4 mg Q6H PRN    multivitamin tablet Daily    naloxone 0.4 mg/mL injection 0.02 mg PRN    ondansetron disintegrating tablet 4 mg Q8H PRN    sodium bicarbonate tablet 650 mg TID    sodium chloride 0.9% flush 10 mL Q12H PRN    tacrolimus capsule 0.5 mg Daily PM    tacrolimus capsule 1 mg Daily AM       Objective:     Vital Signs (Most Recent):  Temp: 98.8 °F (37.1 °C) (04/23/23 1129)  Pulse: 94 (04/23/23 1129)  Resp: 18 (04/23/23 1156)  BP: (!) 95/55 (04/23/23 1129)  SpO2: (!) 93 % (04/23/23 1129)   Vital Signs (24h Range):  Temp:  [97.6 °F (36.4 °C)-98.8 °F (37.1 °C)] 98.8 °F (37.1 °C)  Pulse:  [83-97] 94  Resp:  [18] 18  SpO2:  [90 %-93 %] 93 %  BP: ()/(52-96) 95/55     Weight: 78.5 kg (173 lb 1 oz) (04/21/23 1617)  Body mass index is 24.14 kg/m².  Body surface area is 1.98 meters squared.    I/O last 3 completed shifts:  In: 925.6 [P.O.:420; I.V.:61.2; IV Piggyback:444.5]  Out: 750 [Urine:750]    Physical Exam  Vitals and nursing note reviewed.   Constitutional:       Appearance: He is ill-appearing. He is not toxic-appearing.   HENT:      Head: Atraumatic.   Cardiovascular:      Rate and Rhythm: Normal rate.   Pulmonary:      Effort: Pulmonary effort is normal. No respiratory distress.   Neurological:      Mental Status: He is alert.       Significant Labs: reviewed         Assessment/Plan:     Active Diagnoses:    Diagnosis Date Noted POA    PRINCIPAL PROBLEM:  Fungemia [B49] 04/19/2023 Unknown    Ulcer of right lower extremity with bone involvement without evidence of necrosis [L97.916] 04/17/2023 Yes    Hyponatremia [E87.1] 04/17/2023 Yes    Metabolic acidosis [E87.20] 04/17/2023 Yes    Renal transplant, status post [Z94.0] 11/30/2016 Not Applicable    Chronic obstructive pulmonary disease [J44.9] 09/12/2016 Yes    Hyperlipidemia [E78.5] 07/01/2016 Yes    Type 2 diabetes mellitus with hyperglycemia, with long-term current use of insulin [E11.65, Z79.4]  Not  Applicable    Essential hypertension [I10] 02/20/2015 Yes    GERD (gastroesophageal reflux disease) [K21.9] 10/12/2013 Yes      Problems Resolved During this Admission:     DINORAH on CKD in setting of sepsis   - sCr continues to increase daily   - check txp ultrasound, recheck u/a  - Prograf not acutely supra therapeutic   - continue supportive care as doing    ESRD s/p kidney txp 5/2016  - Prograf level at goal  - continue Prograf 1/0.5mg and continue to hold MMF    Metabolic Acidosis  - continue po bicarb, suspect AKA will help with acidosis     Hyponatremia  - monitor, no indication for treatment     ID  - currently infected old R BKA plus fungemia, for AKA  - recent Proteus sepsis and C diff    Hx of HTN  - relatively hypotensive currently, c/w sepsis and opioid administration   - continue midodrine with hold parameters     Anemia, multifactorial  - will likely need PRBCs intra/post op   - hold on further ESAs        Gianni Masterson MD  Nephrology

## 2023-04-23 NOTE — PROGRESS NOTES
[facility] Surg  Vascular Surgery  Progress Note    Patient Name: Lavelle Ladd  MRN: [id number]  Admission Date: 4/17/2023  Primary Care Provider: Primary Doctor No    Subjective:     Interval History: no acute events  Pt agreeable to proceed with Right AKA    Post-Op Info:  * No surgery found *          Medications:  Continuous Infusions:  Scheduled Meds:   atorvastatin  80 mg Oral Daily    cholecalciferol (vitamin D3)  5,000 Units Oral Every Mon    ferrous sulfate  1 tablet Oral BID    gabapentin  300 mg Oral BID    insulin detemir U-100  12 Units Subcutaneous BID    levothyroxine  125 mcg Oral Daily    meropenem (MERREM) IVPB  500 mg Intravenous Q8H    micafungin (MYCAMINE) IVPB  100 mg Intravenous Q24H    miconazole NITRATE 2 %   Topical (Top) BID    midodrine  2.5 mg Oral Q8H    multivitamin  1 tablet Oral Daily    sodium bicarbonate  650 mg Oral TID    tacrolimus  0.5 mg Oral Daily PM    tacrolimus  1 mg Oral Daily AM     PRN Meds:sodium chloride 0.9%, acetaminophen, acetaminophen, albuterol-ipratropium, dextrose 10%, dextrose 10%, dextrose, dextrose, diphenhydrAMINE, glucagon (human recombinant), HYDROcodone-acetaminophen, insulin aspart U-100, melatonin, morphine, naloxone, ondansetron, sodium chloride 0.9%     Objective:     Vital Signs (Most Recent):  Temp: 97.6 °F (36.4 °C) (04/23/23 0742)  Pulse: 91 (04/23/23 0742)  Resp: 18 (04/23/23 0859)  BP: (!) 142/61 (04/23/23 0742)  SpO2: (!) 90 % (04/23/23 0742)   Vital Signs (24h Range):  Temp:  [97.6 °F (36.4 °C)-98.5 °F (36.9 °C)] 97.6 °F (36.4 °C)  Pulse:  [83-98] 91  Resp:  [16-18] 18  SpO2:  [90 %-92 %] 90 %  BP: (110-160)/(52-96) 142/61         Physical Exam  Exposed bone from right BKA    Significant Labs:  BMP:   Recent Labs   Lab 04/23/23  0521   GLU 67*   *   K 4.5   *   CO2 14*   BUN 39*   CREATININE 2.5*   CALCIUM 8.9     CBC:   Recent Labs   Lab 04/23/23  0521   WBC 13.83*   RBC 3.39*   HGB 8.5*   HCT 28.7*      MCV 85   MCH  25.1*   MCHC 29.6*     Coagulation: No results for input(s): LABPROT, INR, APTT in the last 48 hours.    Significant Diagnostics:  I have reviewed all pertinent imaging results/findings within the past 24 hours.    Assessment/Plan:     Active Diagnoses:    Diagnosis Date Noted POA    PRINCIPAL PROBLEM:  Fungemia [B49] 04/19/2023 Unknown    Ulcer of right lower extremity with bone involvement without evidence of necrosis [L97.916] 04/17/2023 Yes    Hyponatremia [E87.1] 04/17/2023 Yes    Metabolic acidosis [E87.20] 04/17/2023 Yes    Renal transplant, status post [Z94.0] 11/30/2016 Not Applicable    Chronic obstructive pulmonary disease [J44.9] 09/12/2016 Yes    Hyperlipidemia [E78.5] 07/01/2016 Yes    Type 2 diabetes mellitus with hyperglycemia, with long-term current use of insulin [E11.65, Z79.4]  Not Applicable    Essential hypertension [I10] 02/20/2015 Yes    GERD (gastroesophageal reflux disease) [K21.9] 10/12/2013 Yes      Problems Resolved During this Admission:     Right BKA wound with exposed tibia  Patient is now agreeable to proceed with right AKA  Will plan for surgery next week pending OR availability    Horacio Uribe IV, MD  Vascular Surgery  [facility] - Med Surg

## 2023-04-23 NOTE — ASSESSMENT & PLAN NOTE
Patient's FSGs are on current medication regimen.  Last A1c reviewed-   Lab Results   Component Value Date    HGBA1C 6.7 (H) 04/05/2023     Most recent fingerstick glucose reviewed-   Recent Labs   Lab 04/22/23  1548 04/22/23  1943 04/23/23  0539 04/23/23  1129   POCTGLUCOSE 93 115* 69* 173*     Current correctional scale   anti-hyperglycemic dose as follows-   Antihyperglycemics (From admission, onward)    Start     Stop Route Frequency Ordered    04/18/23 2100  insulin detemir U-100 (Levemir) pen 12 Units         -- SubQ 2 times daily 04/17/23 1643    04/17/23 1749  insulin aspart U-100 pen 0-5 Units         -- SubQ Before meals & nightly PRN 04/17/23 1649        Hold Oral hypoglycemics while patient is in the hospital.    Glucose POC, sliding scale insulin, hypoglycemia protocol   At home- on Levemir 12 units b.i.d., sliding scale insulin   Ordered 12 units tonight, hold morning dose for NPO status for any anticipated vascular interventions, ;

## 2023-04-24 PROBLEM — I46.9 CARDIAC ARREST: Status: ACTIVE | Noted: 2023-01-01

## 2023-04-24 PROBLEM — Z97.8 ENDOTRACHEALLY INTUBATED: Status: ACTIVE | Noted: 2023-01-01

## 2023-04-24 NOTE — SUBJECTIVE & OBJECTIVE
Interval History:  Patient seen and examined early a.m..  He is upset because he thought his AKA would happen today.  He denies any shortness of breath, chest pain, nausea, vomiting.    Review of Systems   Respiratory:  Negative for cough and shortness of breath.    Cardiovascular:  Negative for chest pain and palpitations.   Musculoskeletal:         Pain and right stump   All other systems reviewed and are negative.  Objective:     Vital Signs (Most Recent):  Temp: 96.5 °F (35.8 °C) (04/24/23 1050)  Pulse: 94 (04/24/23 1050)  Resp: 18 (04/24/23 1225)  BP: 135/61 (04/24/23 1050)  SpO2: 97 % (04/24/23 1050) Vital Signs (24h Range):  Temp:  [96.5 °F (35.8 °C)-99.2 °F (37.3 °C)] 96.5 °F (35.8 °C)  Pulse:  [] 94  Resp:  [16-20] 18  SpO2:  [93 %-98 %] 97 %  BP: (100-151)/(53-90) 135/61     Weight: 78.5 kg (173 lb 1 oz)  Body mass index is 24.14 kg/m².    Intake/Output Summary (Last 24 hours) at 4/24/2023 1358  Last data filed at 4/24/2023 0804  Gross per 24 hour   Intake 584.57 ml   Output 550 ml   Net 34.57 ml      Physical Exam  Vitals reviewed.   Constitutional:       Appearance: Normal appearance.   HENT:      Head: Normocephalic and atraumatic.      Mouth/Throat:      Mouth: Mucous membranes are moist.      Pharynx: Oropharynx is clear.   Eyes:      Extraocular Movements: Extraocular movements intact.      Conjunctiva/sclera: Conjunctivae normal.   Cardiovascular:      Rate and Rhythm: Normal rate and regular rhythm.      Pulses: Normal pulses.      Heart sounds: Normal heart sounds.   Pulmonary:      Effort: Pulmonary effort is normal.      Breath sounds: Normal breath sounds.   Abdominal:      General: Bowel sounds are normal.      Palpations: Abdomen is soft.   Musculoskeletal:         General: Normal range of motion.      Cervical back: Normal range of motion and neck supple.      Comments: Pain in right stump, dressing intact   Skin:     General: Skin is warm and dry.   Neurological:      General: No  focal deficit present.      Mental Status: He is alert and oriented to person, place, and time. Mental status is at baseline.   Psychiatric:         Mood and Affect: Mood normal.         Behavior: Behavior normal.         Thought Content: Thought content normal.       Significant Labs: All pertinent labs within the past 24 hours have been reviewed.  Blood Culture: No results for input(s): LABBLOO in the last 48 hours.  CBC:   Recent Labs   Lab 04/23/23  0521 04/24/23  0620   WBC 13.83* 11.26   HGB 8.5* 9.0*   HCT 28.7* 30.1*    312     CMP:   Recent Labs   Lab 04/23/23  0521 04/24/23  0620   * 134*   K 4.5 4.7   * 108   CO2 14* 16*   GLU 67* 119*   BUN 39* 40*   CREATININE 2.5* 2.6*   CALCIUM 8.9 9.3   ALBUMIN  --  2.2*   ANIONGAP 7* 10       Significant Imaging: I have reviewed all pertinent imaging results/findings within the past 24 hours.

## 2023-04-24 NOTE — PHYSICIAN QUERY
PT Name: Lavelle Ladd  MR #: [id number]     DOCUMENTATION CLARIFICATION     CDS/: MARLYN Castro, RN, CDS               Contact information:[email or web address]   This form is a permanent document in the medical record.     Query Date: April 24, 2023    By submitting this query, we are merely seeking further clarification of documentation.  Please utilize your independent clinical judgment when addressing the question(s) below.    The Medical Record contains the following:   Indicators   Supporting Clinical Findings Location in Medical Record    Non-blanchable erythema/redness     X Ulcer/Injury/Skin Breakdown  Pressure/ sacral decubitus ulcer (present on admission)    H&P, Dr. Yossi Blair, 4/17    Deep Tissue Injury     X Wound care consult  Present on admission skin breakdown to right distal stump and coccyx      Full thickness ulceration again noted to coccyx with moist pink hypergranulation tissue and macerated wound edges           Altered Skin Integrity: Coccyx   Description: Full thickness tissue loss. Subcutaneous fat may be visible but bone, tendon or muscle are not exposed    Wound care, 4/18   X Acute/Chronic Illness  PMHx of CAD, CHF, s/p kidney transplant  on 5/21/16, COPD, hyperlipidemia, hypertension, peripheral arterial disease, peripheral vascular disease, Application of large external fixation device to tibia (Left, 8/4/2022); Closed reduction of injury of tibia (Left, 8/4/2022); Removal of external fixation device (Left, 9/17/2022); Angiography of lower extremity (N/A, 9/23/2022); Angiography of lower extremity (N/A, 9/29/2022); and Below knee amputation of lower extremity (Left, 10/6/2022)      Fungemia, metabolic acidosis, hyponatremia, Ulcer of right lower extremity with bone involvement without evidence of necrosis    JODIE PINA, VERO/Dr. Hanna, 4/23   X Medication/Treatment  Cleansed with saline and ninoska wound skin painted with cavilon. Sacral foam dressing applied.  Recommend change twice weekly and prn    Wound care, 4/18    Other       The clinical guidelines noted are only a system guideline. It does not replace the providers clinical judgment.    Per the National Pressure Injury Advisory Panel:   A pressure injury is localized damage to the skin and underlying soft tissue usually over a bony prominence or related to a medical or other device. The injury can present as intact skin or an open ulcer and may be painful. The injury occurs as a result of intense and/or prolonged pressure or pressure in combination with shear. The tolerance of soft tissue for pressure and shear may also be affected by microclimate, nutrition, perfusion, co-morbidities and condition of the soft tissue.       Stage 1 Pressure Injury:  Intact skin with a localized area of non-blanchable erythema, which may appear differently in darkly pigmented skin. Color changes do not include purple or maroon discoloration; these may indicate deep tissue pressure injury.    Stage 2 Pressure Injury:  Partial-thickness loss of skin with exposed dermis. The wound bed is viable, pink or red, moist, and may also present as an intact or ruptured serum-filled blister.    Stage 3 Pressure Injury:  Full-thickness loss of skin, in which adipose (fat) is visible in the ulcer and granulation tissue and epibole (rolled wound edges) are often present. Slough and/or eschar may be visible. Undermining and tunneling may occur.    Stage 4 Pressure Injury:  Full-thickness skin and tissue loss with exposed or directly palpable fascia, muscle, tendon, ligament, cartilage or bone in the ulcer. Slough and/or eschar may be visible. Epibole (rolled edges), undermining and/or tunneling often occur.    Unstageable Pressure Injury:  Full-thickness skin and tissue loss in which the extent of tissue damage within the ulcer cannot be confirmed because it is obscured by slough or eschar. If slough or eschar is removed, a Stage 3 or Stage 4  pressure injury will be revealed.        Provider, please Specify the Stage of the Sacral Decubitus Ulcer:     [ x  ] Pressure Injury/Decubitus Ulcer, Stage 3     [   ] Pressure Injury/Decubitus Ulcer, other Stage, please specify:_______     [   ] Other Integumentary Diagnosis (please specify):______________     [  ] Clinically Undetermined             Please document in your progress notes daily for the duration of treatment until resolved and include in your discharge summary.    Reference:    SHERIE Connors., STEPHANIE Garcia., Goldberg, M., SHERIE Coy, SHERIE Up, & ANDREA Napier. (2016). Revised National Pressure Ulcer Advisory Panel Pressure Injury Staging System: Revised Pressure Injury Staging System. J Wound Ostomy Continence Nurs, 43(6), 585-597. doi:10.1097/won.7205868999688092    Form No.10295

## 2023-04-24 NOTE — NURSING
Pt is resting in bed.  No signs of distress noted. No adverse events overnight. Pt remains free from falls. Q12 chart check complete.

## 2023-04-24 NOTE — SUBJECTIVE & OBJECTIVE
Past Medical History:   Diagnosis Date    DINORAH (acute kidney injury) 2016    Arthritis     CAD in native artery 2019    CHF (congestive heart failure)     Chronic obstructive pulmonary disease 2016    Coronary artery disease involving native coronary artery of native heart without angina pectoris 2016    CRI (chronic renal insufficiency) 2019     donor kidney transplant for DM 16     Induction with Thymo x3 and IV solumedrol to total 875mg  Kidney Biopsy  2016: 16 glomeruli, ACR type 1 AVR type 2, significant microcirculatory changes, c4d negative, No DSA, 5 to10% fibrosis. Treated with thymo x8 2016- no rejection      Diastolic heart failure     Encounter for blood transfusion     ESRD on RRT since 10/2013 10/29/2013    Biopsy proven diabetic nephropathy and lymphoplasmacytic interstitial infiltrate not c/w with AIN (ddx sjogrens or assoc with tamm-horsefall protein extravasation)     GERD (gastroesophageal reflux disease)     History of hepatitis C, s/p successful Rx w/ SVR12 - 2017    Completed 12 weeks harvoni w/ SVR    Hyperlipidemia     Hypertension     PAD (peripheral artery disease) 2019    PIC line (peripherally inserted central catheter) flush     Prophylactic immunotherapy     Proteinuria     PVD (peripheral vascular disease) 2017    RLE BKA CT 16 Extensive atherosclerotic disease of the aorta and mesenteric arteries.     Renal hypertension     Type 2 diabetes mellitus with diabetic neuropathy, with long-term current use of insulin 2016    Vitamin B12 deficiency        Past Surgical History:   Procedure Laterality Date    ANGIOGRAPHY OF LOWER EXTREMITY N/A 2022    Procedure: ANGIOGRAM, LOWER EXTREMITY/left leg;  Surgeon: Donal Mcdonald MD;  Location: [facility] CATH LAB;  Service: Cardiovascular;  Laterality: N/A;    ANGIOGRAPHY OF LOWER EXTREMITY N/A 2022    Procedure: ANGIOGRAM, LOWER EXTREMITY/left leg;  Surgeon: Donal  MD Tamara;  Location: [facility] CATH LAB;  Service: Peripheral Vascular;  Laterality: N/A;  resched from 9/23 pt ready now    AORTOGRAPHY WITH SERIALOGRAPHY N/A 6/14/2018    Procedure: LEFT LEG ANGIOGRAM;  Surgeon: Donal Mcdonald MD;  Location: [facility] CATH LAB;  Service: Vascular;  Laterality: N/A;    APPLICATION OF LARGE EXTERNAL FIXATION DEVICE TO TIBIA Left 8/4/2022    Procedure: APPLICATION, EXTERNAL FIXATION DEVICE, LARGE, TIBIA;  Surgeon: Good Villa MD;  Location: [facility];  Service: Orthopedics;  Laterality: Left;    av bovine graft      Left UE    AV FISTULA PLACEMENT      left UE    BELOW KNEE AMPUTATION OF LOWER EXTREMITY Left 10/6/2022    Procedure: AMPUTATION, BELOW KNEE;  Surgeon: Donal Mcdonald MD;  Location: [facility];  Service: Vascular;  Laterality: Left;    CARDIAC CATHETERIZATION  02/2015    CLOSED REDUCTION OF INJURY OF TIBIA Left 8/4/2022    Procedure: CLOSED REDUCTION, TIBIA;  Surgeon: Good Villa MD;  Location: [facility];  Service: Orthopedics;  Laterality: Left;  Closed reduction left tibial and fibular shaft fractures    CLOSURE OF WOUND Left 9/24/2018    Procedure: CLOSURE, WOUND;  Surgeon: Karla Wheeler DPM;  Location: [facility] OR;  Service: Podiatry;  Laterality: Left;  Secondary Wound closure, extensive    CLOSURE OF WOUND Left 11/5/2018    Procedure: CLOSURE, WOUND;  Surgeon: Karla Wheeler DPM;  Location: [facility];  Service: Podiatry;  Laterality: Left;    DEBRIDEMENT OF MULTIPLE METATARSAL BONES Left 11/5/2018    Procedure: DEBRIDEMENT, METATARSAL BONE, 2 OR MORE BONES;  Surgeon: Karla Wheeler DPM;  Location: [facility];  Service: Podiatry;  Laterality: Left;    EXCISION OF SKIN Left 9/27/2019    Procedure: EXCISION, SKIN;  Surgeon: Lenard Alarcon MD;  Location: [address];  Service: Plastics;  Laterality: Left;  Plastics set, NIMS monitor, ACell    FOOT AMPUTATION THROUGH METATARSAL Left 9/21/2018    Procedure: AMPUTATION, FOOT, TRANSMETATARSAL;  Surgeon: Karla Wheeler  LASHONDA;  Location: [facility] OR;  Service: Podiatry;  Laterality: Left;    FOOT AMPUTATION THROUGH METATARSAL Left 10/31/2018    Procedure: AMPUTATION, FOOT, TRANSMETATARSAL;  Surgeon: Karla Wheeler DPM;  Location: [facility] OR;  Service: Podiatry;  Laterality: Left;    FOOT AMPUTATION THROUGH METATARSAL Left 11/5/2018    Procedure: AMPUTATION, FOOT, TRANSMETATARSAL;  Surgeon: Karla Wheeler DPM;  Location: [facility] OR;  Service: Podiatry;  Laterality: Left;  revisional transmetatarsal amputation, Left foot    IRRIGATION AND DEBRIDEMENT OF LOWER EXTREMITY Left 10/31/2018    Procedure: IRRIGATION AND DEBRIDEMENT, LOWER EXTREMITY;  Surgeon: Karla Wheeler DPM;  Location: [facility] OR;  Service: Podiatry;  Laterality: Left;    KIDNEY TRANSPLANT  05/21/2016    LEFT HEART CATHETERIZATION Left 7/21/2019    Procedure: CATHETERIZATION, HEART, LEFT;  Surgeon: Andrew Valdez MD;  Location: [facility] CATH LAB;  Service: Cardiology;  Laterality: Left;    LEG AMPUTATION THROUGH KNEE  2011    right LE, started as nail puncture leading to diabetic ulcer    REMOVAL OF EXTERNAL FIXATION DEVICE Left 9/17/2022    Procedure: REMOVAL, EXTERNAL FIXATION DEVICE;  Surgeon: Kathleen Zazueta MD;  Location: [facility] OR;  Service: Orthopedics;  Laterality: Left;    SKIN FULL THICKNESS GRAFT Left 10/7/2019    Procedure: APPLICATION, GRAFT, SKIN, FULL-THICKNESS;  Surgeon: Lenard Alarcon MD;  Location: [address];  Service: Plastics;  Laterality: Left;    SURGICAL REMOVAL OF LESION OF FACE Right 10/7/2019    Procedure: EXCISION, LESION, FACE;  Surgeon: Lenard Alarcon MD;  [address]  Service: [address]astics;  Laterality: Right;       Review of patient's allergies indicates:  No Known Allergies    Family History       Problem Relation (Age of Onset)    Cancer Father    Diabetes Father    Heart failure Father, Mother    Stroke Father          Tobacco Use    Smoking status: Former     Packs/day: 1.00     Years: 40.00     Pack years: 40.00      Types: Cigarettes     Quit date: 1/11/2013     Years since quitting: 10.2    Smokeless tobacco: Never   Substance and Sexual Activity    Alcohol use: Yes     Alcohol/week: 6.0 standard drinks     Types: 6 Cans of beer per week     Comment: seldom    Drug use: No    Sexual activity: Never         Review of Systems   Unable to perform ROS: Intubated   Objective:     Vital Signs (Most Recent):  Temp: 96.5 °F (35.8 °C) (04/24/23 1050)  Pulse: (!) 136 (04/24/23 1336)  Resp: 18 (04/24/23 1225)  BP: 135/61 (04/24/23 1050)  SpO2: 97 % (04/24/23 1050)   Vital Signs (24h Range):  Temp:  [96.5 °F (35.8 °C)-99.2 °F (37.3 °C)] 96.5 °F (35.8 °C)  Pulse:  [] 136  Resp:  [16-20] 18  SpO2:  [93 %-98 %] 97 %  BP: (100-151)/(53-90) 135/61     Weight: 78.5 kg (173 lb 1 oz)  Body mass index is 24.14 kg/m².      Intake/Output Summary (Last 24 hours) at 4/24/2023 1419  Last data filed at 4/24/2023 0804  Gross per 24 hour   Intake 584.57 ml   Output 550 ml   Net 34.57 ml       Physical Exam  Vitals and nursing note reviewed.   Constitutional:       General: He is in acute distress.      Appearance: He is well-developed. He is ill-appearing and toxic-appearing.   HENT:      Head: Normocephalic and atraumatic.      Nose: Nose normal.      Mouth/Throat:      Comments: ET tube  Eyes:      Extraocular Movements: Extraocular movements intact.   Cardiovascular:      Rate and Rhythm: Regular rhythm. Tachycardia present.   Pulmonary:      Effort: Pulmonary effort is normal.      Breath sounds: No stridor.   Abdominal:      General: There is no distension.   Musculoskeletal:         General: Deformity present. No signs of injury.      Cervical back: Normal range of motion and neck supple.      Comments: Bilateral BKA  Left upper extremity AV fistula    Skin:     General: Skin is dry.   Neurological:      General: No focal deficit present.   Psychiatric:         Behavior: Behavior normal.       Vents:       Lines/Drains/Airways       Drain   Duration                  Hemodialysis AV Fistula 09/14/22 0717 Left upper arm 222 days              Airway  Duration                  Airway - Non-Surgical 04/24/23 1342 Endotracheal Tube <1 day              Peripheral Intravenous Line  Duration                  Peripheral IV - Single Lumen 04/24/23 0342 20 G Posterior;Right Hand <1 day                    Significant Labs:    CBC/Anemia Profile:  Recent Labs   Lab 04/23/23  0521 04/24/23  0620   WBC 13.83* 11.26   HGB 8.5* 9.0*   HCT 28.7* 30.1*    312   MCV 85 83   RDW 16.1* 17.1*        Chemistries:  Recent Labs   Lab 04/23/23  0521 04/24/23  0620   * 134*   K 4.5 4.7   * 108   CO2 14* 16*   BUN 39* 40*   CREATININE 2.5* 2.6*   CALCIUM 8.9 9.3   ALBUMIN  --  2.2*   PHOS  --  3.3      Latest Reference Range & Units Most Recent   BNP 0 - 99 pg/mL 84  4/17/23 21:19   CPK 20 - 200 U/L 46  10/8/22 14:15   CPK MB 0.1 - 6.5 ng/mL 8.9 (H)  7/21/19 02:51   CRP, High Sensitivity 0.00 - 3.19 mg/L 34.18 (H)  7/22/18 16:13    - 260 U/L 157  8/30/22 17:51   MB % 0.0 - 5.0 % 13.5 (H)  7/21/19 02:51   Troponin I 0.000 - 0.026 ng/mL <0.006  4/17/23 21:19   (H): Data is abnormally high  EKG 04/05/2023  Test Reason : R50.9,     Vent. Rate : 117 BPM     Atrial Rate : 117 BPM      P-R Int : 184 ms          QRS Dur : 090 ms       QT Int : 316 ms       P-R-T Axes : 062 -13 068 degrees      QTc Int : 440 ms     Sinus tachycardia   Low voltage QRS   Borderline Abnormal ECG   When compared with ECG of 28-SEP-2022 13:08,   Sinus rhythm is no longer with 2nd degree A-V block (Mobitz I)   Vent. rate has increased BY  63 BPM   Confirmed by MD HARPAL, RAYMOND ([id number]) on 4/8/2023 9:32:44 PM     Echo 2022    The left ventricle is normal in size with moderate concentric hypertrophy and normal systolic function.  The estimated ejection fraction is 60%.  Normal left ventricular diastolic function.  Normal right ventricular size with normal right ventricular systolic  function.  Mild tricuspid regurgitation.  Normal central venous pressure (3 mmHg).  The estimated PA systolic pressure is 29 mmHg.       Significant Imaging:   I have reviewed all pertinent imaging results/findings within the past 24 hours.  I have reviewed and interpreted all pertinent imaging results/findings within the past 24 hours.

## 2023-04-24 NOTE — HOSPITAL COURSE
Patient had a cardiac arrest around 1:30 p.m., monitor reviewed, had bradycardia then asystole.  Status post CPR for about 4 minutes.  One epi and 1 bicarb given.  Intubated on the floor and transferred to ICU.  On Levophed drip now.  Sedated with Ativan and fentanyl.

## 2023-04-24 NOTE — SIGNIFICANT EVENT
Code blue:  Called to patient's room after he was noted to Rufus down on monitor.  Patient had vomiting episode which was witnessed by nurse after which he had aspiration event.  Patient noted to have bradycardic event and subsequent arrest.  CPR was begun immediately and ICU physician responded.  Patient was intubated by Pulmonary Critical Care, had return rhythm, blood pressure and transferred to ICU.  Critical care physician will discuss with family.

## 2023-04-24 NOTE — PT/OT/SLP PROGRESS
Occupational Therapy      Patient Name:  Lavelle Ladd   MRN:  [id number]    Patient not seen today secondary to code blue with transfer to ICU. Will follow up next date and determine level of appropriateness of skilled intervention.    Jessica Enriquez, GIOVANI    4/24/2023  [id number]

## 2023-04-24 NOTE — ASSESSMENT & PLAN NOTE
History of renal transplant in 2016   Creatinine 1.9, (creatinine 1.9 at the time of discharge as of 4/13)  Avoid nephrotoxins,  Renal dose of medications   CellCept held due to underlying infection, ordered Prograf 1 mg q.a.m., 0.5 mg q.h.s. as recommended by Nephrology during most recent admission   Prograf level adequate  Chronic hydro been transplanted kidney resistive indices unremarkable  Nephrology following

## 2023-04-24 NOTE — PROGRESS NOTES
[facility] Medicine  Progress Note    Patient Name: Lavelle Ladd  MRN: [id number]  Patient Class: IP- Inpatient   Admission Date: 4/17/2023  Length of Stay: 4 days  Attending Physician: Atilio Bullock MD  Primary Care Provider: Primary Doctor No        Subjective:     Principal Problem:Fungemia        HPI:  Lavelle Ladd is a 67 y.o. male patient with a PMHx of CAD, CHF, s/p kidney transplant  on 5/21/16, COPD, hyperlipidemia, hypertension, peripheral arterial disease, peripheral vascular disease, Application of large external fixation device to tibia (Left, 8/4/2022); Closed reduction of injury of tibia (Left, 8/4/2022); Removal of external fixation device (Left, 9/17/2022); Angiography of lower extremity (N/A, 9/23/2022); Angiography of lower extremity (N/A, 9/29/2022); and Below knee amputation of lower extremity (Left, 10/6/2022) who presents to the Emergency Department for RLE wound check. Pt reports a wound to his R BKA stump for the past week, but states that it has worsened since this morning.  Was evaluated by home health nurse, and has been recommended to go to ED due to likely findings of exposure of bone on examination.  Stated that Symptoms are constant and moderate in severity. No mitigating or exacerbating factors reported. Associated sxs include RLE pain and intermittent nausea. Pt also reports slurred speech since taking Tramadol for the first time this morning. Patient denies any fever, chills, vomiting, SOB, CP, weakness, numbness, dizziness, headache, and all other sxs at this time.   Stated improvement in diarrhea, stated having 2-3 episodes of formed stools, stated being compliant with medications/antibiotics at home.        Of note patient has been hospitalized from 04/05 to 4/13- with septic shock/  bacteremia , wound infection ,C,diff and osteo ; initially has been admitted to ICU due to requirement of pressors, has been downgraded to floor and subsequently got  discharged on 04/13 on 6 weeks of antibiotics including IV cefepime and p.o. vanc with end of treatment 05/25/2023.    Also patient has been started on p.o. vanc for findings of positive C diff on 04/06/2023 and has been recommended to take for total 10 days of duration with end of treatment 04/16/2023. ID has been consulted;   Status post PICC line placement on 04/11/2023.  Also during recent hospitalization for underlying renal transplant with DINORAH findings with creatinine up to 2.2 on admission, nephrology has been consulted, CellCept has been held due to underlying active infection, nephrology recommended Prograf 1 mg in a.m., 0.5 mg q.h.s. to maintain levels between 4-6.    On arrival to ED, patient remained afebrile, no tachycardia, no tachypnea, blood pressure soft, lactic acid 0.6, received 2 L of IV fluids, blood cultures x2 ordered and antibiotics were started within 4 hours duration.  Vascular surgery consulted;   For the findings of slurred speech on arrival, patient underwent CT head while in ED, showed no acute intracranial findings.      Pt has been   admitted multiple times since 8 /4/22  in multiples facility including  [facility], [facility] , [facility]  and  [facility] .      Lives alone at home, stated that he gets support from his sister, ;  Code status:  Full code (alert and oriented x3)                      Overview/Hospital Course:  Lavelle Ladd is a 66 yo male, who was admitted (4/5/23-4/13/23) with septic shock/  bacteremia , wound infection ,C diff colitis and osteomyelitis of R BKA stump s/p 6 weeks antimicrobial therapy. He presented to ED with worsening of R BKA wound with bone exposure and necrosis. Vanc, flagyl and cefepime initiated. Vascular surgery consulted with recommendation for right above the knee amputation. Patient states he would like 2nd opinion.   4/19/23 1 of 2 blood cultures collected on admit positive for yeast. Per ID start Micafungin. Repeat blood cultures in am.  Wound cultures pending. Start bicarb for management of metabolic acidosis per nephrology recommendation.   4/20 wound culture positive for pseudomonas. Continue antibiotic recs per ID. Patient requesting to speak with vascular service regarding amputation- as he has more questions before making final decision Dr. Mcdonald notified.   4/21 patient agrees to R AKA next week. Vascular notified.      Interval History:  Patient seen and examined early a.m..  He is upset because he thought his AKA would happen today.  He denies any shortness of breath, chest pain, nausea, vomiting.    Review of Systems   Respiratory:  Negative for cough and shortness of breath.    Cardiovascular:  Negative for chest pain and palpitations.   Musculoskeletal:         Pain and right stump   All other systems reviewed and are negative.  Objective:     Vital Signs (Most Recent):  Temp: 96.5 °F (35.8 °C) (04/24/23 1050)  Pulse: 94 (04/24/23 1050)  Resp: 18 (04/24/23 1225)  BP: 135/61 (04/24/23 1050)  SpO2: 97 % (04/24/23 1050) Vital Signs (24h Range):  Temp:  [96.5 °F (35.8 °C)-99.2 °F (37.3 °C)] 96.5 °F (35.8 °C)  Pulse:  [] 94  Resp:  [16-20] 18  SpO2:  [93 %-98 %] 97 %  BP: (100-151)/(53-90) 135/61     Weight: 78.5 kg (173 lb 1 oz)  Body mass index is 24.14 kg/m².    Intake/Output Summary (Last 24 hours) at 4/24/2023 1358  Last data filed at 4/24/2023 0804  Gross per 24 hour   Intake 584.57 ml   Output 550 ml   Net 34.57 ml      Physical Exam  Vitals reviewed.   Constitutional:       Appearance: Normal appearance.   HENT:      Head: Normocephalic and atraumatic.      Mouth/Throat:      Mouth: Mucous membranes are moist.      Pharynx: Oropharynx is clear.   Eyes:      Extraocular Movements: Extraocular movements intact.      Conjunctiva/sclera: Conjunctivae normal.   Cardiovascular:      Rate and Rhythm: Normal rate and regular rhythm.      Pulses: Normal pulses.      Heart sounds: Normal heart sounds.   Pulmonary:      Effort: Pulmonary  effort is normal.      Breath sounds: Normal breath sounds.   Abdominal:      General: Bowel sounds are normal.      Palpations: Abdomen is soft.   Musculoskeletal:         General: Normal range of motion.      Cervical back: Normal range of motion and neck supple.      Comments: Pain in right stump, dressing intact   Skin:     General: Skin is warm and dry.   Neurological:      General: No focal deficit present.      Mental Status: He is alert and oriented to person, place, and time. Mental status is at baseline.   Psychiatric:         Mood and Affect: Mood normal.         Behavior: Behavior normal.         Thought Content: Thought content normal.       Significant Labs: All pertinent labs within the past 24 hours have been reviewed.  Blood Culture: No results for input(s): LABBLOO in the last 48 hours.  CBC:   Recent Labs   Lab 04/23/23  0521 04/24/23  0620   WBC 13.83* 11.26   HGB 8.5* 9.0*   HCT 28.7* 30.1*    312     CMP:   Recent Labs   Lab 04/23/23  0521 04/24/23  0620   * 134*   K 4.5 4.7   * 108   CO2 14* 16*   GLU 67* 119*   BUN 39* 40*   CREATININE 2.5* 2.6*   CALCIUM 8.9 9.3   ALBUMIN  --  2.2*   ANIONGAP 7* 10       Significant Imaging: I have reviewed all pertinent imaging results/findings within the past 24 hours.      Assessment/Plan:      * Fungemia  -start Micafungin per ID recs   - follow repeat cultures     4/23/23  Repeat cultures NGTD     Will need 2 weeks of therapy and eye exam prior to discharge      Metabolic acidosis  Bicarb of 16, anion gap 9, likely secondary to underlying kidney disease   Monitor and consider replacement as needed   Nephrology following     4/19  -resume po bicarb       Hyponatremia  Sodium 129  Baseline sodium runs between 129-133 likely secondary to underlying findings of cirrhosis as of ultrasound abdomen from 09/2022  Follow up on serum osmolality, urine osmolality, lytes, TSH, nephrology follow up     4/18  Na improved, 133   Nephrology following          Ulcer of right lower extremity with bone involvement without evidence of necrosis  Presented with right stump pain (8/10) and discharge; (gradual worsening of lesion with findings of exposed bone)  MRI as of 4/9- Findings concerning for early osteomyelitis of the stump of the tibia about the wound margin.  Xray tibia/ fibula right- Below the knee amputation changes.  No acute fracture or suspicious osseous lesion.  Anatomical joint alignment.  No definite subcutaneous gas.  Soft tissue wound extending to the osseous margin of the distal tibia.  No definite associated erosive ostia changes.  S/p Application of large external fixation device to tibia (Left, 8/4/2022); Closed reduction of injury of tibia (Left, 8/4/2022); Removal of external fixation device (Left, 9/17/2022); Angiography of lower extremity (N/A, 9/23/2022); Angiography of lower extremity (N/A, 9/29/2022); and Below knee amputation of lower extremity (Left, 10/6/2022 ;    Recently discharged on 04/13 on IV cefepime, p.o. Flagyl for 6 weeks' course, with end of treatment 5/25; status post PICC line placed on 04/11 4/20   wound culture positive for pseudomonas, blood cx fungus   - final culture pending   ID following     4/18  - Vascular following- rec R AKA      Afebrile, no leukocytosis on arrival, no tachycardia, no tachypnea, blood pressure is soft, lactic acid 0.6; received 2 L of IV fluids, antibiotics, blood cultures, vascular consulted; wound care follow up  To continue cefepime, Flagyl, follow up with vascular, NPO except for medications since midnight for any anticipated vascular intervention     4/19  Vascular recommends right AKA, patient requesting 2nd opinion   -continue vanc, cefepime, flagyl and micafungin   -follow wound cultures     4/21  -Plan for R AKA early next week  -continue antibiotic and antifungal per ID recs     Patient agreed to AKA will discuss with vascular timing                  Renal transplant, status  post  History of renal transplant in 2016   Creatinine 1.9, (creatinine 1.9 at the time of discharge as of 4/13)  Avoid nephrotoxins,  Renal dose of medications   CellCept held due to underlying infection, ordered Prograf 1 mg q.a.m., 0.5 mg q.h.s. as recommended by Nephrology during most recent admission   Prograf level adequate  Chronic hydro been transplanted kidney resistive indices unremarkable  Nephrology following         Chronic obstructive pulmonary disease  Currently not in acute exacerbation   Nebs p.r.n.      Hyperlipidemia   Resume home dose      Type 2 diabetes mellitus with hyperglycemia, with long-term current use of insulin  Patient's FSGs are on current medication regimen.  Last A1c reviewed-   Lab Results   Component Value Date    HGBA1C 6.7 (H) 04/05/2023     Most recent fingerstick glucose reviewed-   Recent Labs   Lab 04/23/23  1649 04/23/23  1929 04/24/23  0459 04/24/23  1048   POCTGLUCOSE 121* 181* 125* 184*     Current correctional scale   anti-hyperglycemic dose as follows-   Antihyperglycemics (From admission, onward)    Start     Stop Route Frequency Ordered    04/18/23 2100  insulin detemir U-100 (Levemir) pen 12 Units         -- SubQ 2 times daily 04/17/23 1643    04/17/23 1749  insulin aspart U-100 pen 0-5 Units         -- SubQ Before meals & nightly PRN 04/17/23 1649        Hold Oral hypoglycemics while patient is in the hospital.    Glucose POC, sliding scale insulin, hypoglycemia protocol   At home- on Levemir 12 units b.i.d., sliding scale insulin   Ordered 12 units tonight, hold morning dose for NPO status for any anticipated vascular interventions, ;     Essential hypertension  Blood pressure 90s over 50s at the time of admission, hold home medications including amlodipine, Coreg   Monitor and resume medications accordingly      GERD (gastroesophageal reflux disease)          VTE Risk Mitigation (From admission, onward)         Ordered     Place sequential compression device   Until discontinued         04/24/23 1356     IP VTE HIGH RISK PATIENT  Once         04/17/23 1649     Place sequential compression device  Until discontinued         04/17/23 1649                Discharge Planning   LISETH:      Code Status: Full Code   Is the patient medically ready for discharge?:     Reason for patient still in hospital (select all that apply): Patient trending condition and Consult recommendations  Discharge Plan A: Home Health   Discharge Delays: None known at this time              Susan Cordero MD  Department of Hospital Medicine   [facility]

## 2023-04-24 NOTE — HPI
67-year-old male patient with extensive medical history and multiple recent hospital admissions admitted to the hospital for/17/2023 for wound of right BKA.  Kidney transplant with bilateral BKA immunocompromised, recent history of C diff, current Pseudomonas wound infection history of gram-negative bacteremia and strep bacteremia April 2023 and Candida glabrata fungemia this admission

## 2023-04-24 NOTE — PROCEDURES
Lavelle Ladd is a 67 y.o. male patient.    Temp: 98.8 °F (37.1 °C) (04/24/23 1415)  Pulse: (!) 113 (04/24/23 1515)  Resp: 19 (04/24/23 1515)  BP: (!) 100/50 (04/24/23 1515)  SpO2: 100 % (04/24/23 1515)  Weight: 81.1 kg (178 lb 12.7 oz) (04/24/23 1415)  Height: 4' (121.9 cm) (without prosthesis) (04/24/23 1415)       Central Line    Date/Time: 4/24/2023 3:39 PM  Performed by: Atilio Bullock MD  Authorized by: Atilio Bullock MD     Location procedure was performed:  [facility]  Assisting Provider:  Delroy Hanna NP  Pre-operative diagnosis:  Shock  Post-operative diagnosis:  Same  Consent Done ?:  Emergent Situation  Time out complete?: Verified correct patient, procedure, equipment, staff, and site/side    Indications:  Med administration  Description of findings:  Large right IJ noted  Preparation:  Skin prepped with ChloraPrep  Skin prep agent dried: Skin prep agent completely dried prior to procedure    Sterile barriers: All five maximal sterile barriers used - gloves, gown, cap, mask and large sterile sheet    Hand hygiene: Hand hygiene performed immediately prior to central venous catheter insertion    Location:  Right internal jugular  Catheter type:  Triple lumen  Catheter size:  7 Fr  Inserted Catheter Length (cm):  16  Ultrasound guidance: Yes    Vessel Caliber:  Large  Comprressibility:  Normal  Needle advanced into vessel with real time ultrasound guidance.    Guidewire confirmed in vessel.    Steril sheath on probe.    Sterile gel used.  Manometry: No    Number of attempts:  2  Securement:  Line sutured, chlorhexidine patch, sterile dressing applied and blood return through all ports  Technical Procedures Used:  Seldinger maneuver  Significant surgical tasks conducted by the assistant(s):  Patient monitoring  Complications: No    Estimated blood loss (mL):  5  Specimens: No    Implants: Yes (specify)    Post-procedure assessment: Pending.  Adverse Events:  None    4/24/2023

## 2023-04-24 NOTE — ASSESSMENT & PLAN NOTE
Cardiac arrest status post CPR.  Return of circulation in about 4 minutes with 1 epi and 1 bicarb.  Currently DNR.  Will not proceed with targeted temperature management  Resuscitation with fluid vasopressors continue broad-spectrum antibiotic O2/MV/pulmonary toilet

## 2023-04-24 NOTE — ASSESSMENT & PLAN NOTE
Patient's FSGs are on current medication regimen.  Last A1c reviewed-   Lab Results   Component Value Date    HGBA1C 6.7 (H) 04/05/2023     Most recent fingerstick glucose reviewed-   Recent Labs   Lab 04/23/23  1649 04/23/23  1929 04/24/23  0459 04/24/23  1048   POCTGLUCOSE 121* 181* 125* 184*     Current correctional scale   anti-hyperglycemic dose as follows-   Antihyperglycemics (From admission, onward)    Start     Stop Route Frequency Ordered    04/18/23 2100  insulin detemir U-100 (Levemir) pen 12 Units         -- SubQ 2 times daily 04/17/23 1643    04/17/23 1749  insulin aspart U-100 pen 0-5 Units         -- SubQ Before meals & nightly PRN 04/17/23 1649        Hold Oral hypoglycemics while patient is in the hospital.    Glucose POC, sliding scale insulin, hypoglycemia protocol   At home- on Levemir 12 units b.i.d., sliding scale insulin   Ordered 12 units tonight, hold morning dose for NPO status for any anticipated vascular interventions, ;

## 2023-04-24 NOTE — PLAN OF CARE
Problem: Adult Inpatient Plan of Care  Goal: Plan of Care Review  Outcome: Ongoing, Progressing  Goal: Patient-Specific Goal (Individualized)  Outcome: Ongoing, Progressing  Goal: Absence of Hospital-Acquired Illness or Injury  Outcome: Ongoing, Progressing  Goal: Optimal Comfort and Wellbeing  Outcome: Ongoing, Progressing  Goal: Readiness for Transition of Care  Outcome: Ongoing, Progressing     Problem: Diabetes Comorbidity  Goal: Blood Glucose Level Within Targeted Range  Outcome: Ongoing, Progressing     Problem: Adjustment to Illness (Sepsis/Septic Shock)  Goal: Optimal Coping  Outcome: Ongoing, Progressing     Problem: Bleeding (Sepsis/Septic Shock)  Goal: Absence of Bleeding  Outcome: Ongoing, Progressing     Problem: Glycemic Control Impaired (Sepsis/Septic Shock)  Goal: Blood Glucose Level Within Desired Range  Outcome: Ongoing, Progressing     Problem: Infection Progression (Sepsis/Septic Shock)  Goal: Absence of Infection Signs and Symptoms  Outcome: Ongoing, Progressing     Problem: Nutrition Impaired (Sepsis/Septic Shock)  Goal: Optimal Nutrition Intake  Outcome: Ongoing, Progressing     Problem: Fluid Imbalance (Pneumonia)  Goal: Fluid Balance  Outcome: Ongoing, Progressing     Problem: Infection (Pneumonia)  Goal: Resolution of Infection Signs and Symptoms  Outcome: Ongoing, Progressing     Problem: Respiratory Compromise (Pneumonia)  Goal: Effective Oxygenation and Ventilation  Outcome: Ongoing, Progressing     Problem: Infection  Goal: Absence of Infection Signs and Symptoms  Outcome: Ongoing, Progressing     Problem: Impaired Wound Healing  Goal: Optimal Wound Healing  Outcome: Ongoing, Progressing     Problem: Skin Injury Risk Increased  Goal: Skin Health and Integrity  Outcome: Ongoing, Progressing     Problem: Pain Acute  Goal: Acceptable Pain Control and Functional Ability  Outcome: Ongoing, Progressing     Pt likes to keep door open at all times.

## 2023-04-24 NOTE — ASSESSMENT & PLAN NOTE
Presented with right stump pain (8/10) and discharge; (gradual worsening of lesion with findings of exposed bone)  MRI as of 4/9- Findings concerning for early osteomyelitis of the stump of the tibia about the wound margin.  Xray tibia/ fibula right- Below the knee amputation changes.  No acute fracture or suspicious osseous lesion.  Anatomical joint alignment.  No definite subcutaneous gas.  Soft tissue wound extending to the osseous margin of the distal tibia.  No definite associated erosive ostia changes.  S/p Application of large external fixation device to tibia (Left, 8/4/2022); Closed reduction of injury of tibia (Left, 8/4/2022); Removal of external fixation device (Left, 9/17/2022); Angiography of lower extremity (N/A, 9/23/2022); Angiography of lower extremity (N/A, 9/29/2022); and Below knee amputation of lower extremity (Left, 10/6/2022 ;    Recently discharged on 04/13 on IV cefepime, p.o. Flagyl for 6 weeks' course, with end of treatment 5/25; status post PICC line placed on 04/11 4/20   wound culture positive for pseudomonas, blood cx fungus   - final culture pending   ID following     4/18  - Vascular following- rec R AKA      Afebrile, no leukocytosis on arrival, no tachycardia, no tachypnea, blood pressure is soft, lactic acid 0.6; received 2 L of IV fluids, antibiotics, blood cultures, vascular consulted; wound care follow up  To continue cefepime, Flagyl, follow up with vascular, NPO except for medications since midnight for any anticipated vascular intervention     4/19  Vascular recommends right AKA, patient requesting 2nd opinion   -continue vanc, cefepime, flagyl and micafungin   -follow wound cultures     4/21  -Plan for R AKA early next week  -continue antibiotic and antifungal per ID recs     Patient agreed to AKA will discuss with vascular timing

## 2023-04-24 NOTE — H&P
Advance Care Planning  Code Status  In light of the patients advanced and terminal illness, we reviewed what the patients /sister preferences after this current code/cardiac for care would be at the very end of life.  The patient sister wishes to have a natural, peaceful death.  Along those lines, the patient sister does not wish to have a repeat CPR or other invasive treatments performed when his heart and/or breathing stops.  I communicated to the patient sister that a DNR order would be placed in his medical record to reflect this preference.  I spent a total of 15 minutes engaging the patient in this advance care planning discussion.           Atilio Bullock M.D

## 2023-04-24 NOTE — PROGRESS NOTES
Code blue called an upon RT arrival CPR in progress. Pt being ventilated per ambu mask/o2 tank with 100% fio2. Pt intubated per Dr. AUREA Bullock, positive color change noted on colormetric device. ROSC achieved. Lg amts of green food like substance noted in ETT, in line suction at this time. ETCO2 zoll placed, 46 mmhg. Pt transported to ICU without difficulty. ETT remains secure 25 @ lip.  ICU RT assumed care of pt.

## 2023-04-24 NOTE — CARE UPDATE
Discussed with patient family sister and brother in ICU patient condition.      Family wanted to switch to comfort care.      Patient was transition to comfort care and terminally extubated.      1745 was the time of death.  Family informed.  Cause of death Fungemia

## 2023-04-24 NOTE — NURSING
Preparing for palliative extubation per MD order based on Family honoring wishes of patient.     Aye, RT and Primary RN at bedside.

## 2023-04-24 NOTE — ASSESSMENT & PLAN NOTE
-start Micafungin per ID recs   - follow repeat cultures     4/23/23  Repeat cultures NGTD     Will need 2 weeks of therapy and eye exam prior to discharge

## 2023-04-24 NOTE — PT/OT/SLP PROGRESS
Physical Therapy      Patient Name:  Lavelle Ladd   MRN:  [id number]    13:33  Patient not seen today secondary to code blue with transfer to ICU.   Will follow and determine level of appropriateness of skilled intervention.

## 2023-04-24 NOTE — NURSING
Dr. Bullock (ICU Service) notified patient asystole on the monitor with no palpable pulse or respirations.      Patient's family (Sister Kecia, brother Harsha (and his wife) at bedside.    Dr. Bullock in route to assess patient at this time.

## 2023-04-24 NOTE — DISCHARGE SUMMARY
[facility] - Intensive Care ([facility])  Critical Care Medicine  Discharge Summary      Patient Name: Lavelle Ladd  MRN: [id number]  Admission Date: 4/17/2023  Hospital Length of Stay: 4 days  Discharge Date and Time:  04/24/2023 5:58 PM  Attending Physician: Atilio Bullock MD   Discharging Provider: Atilio Bullock MD  Primary Care Provider: Primary Doctor No  Reason for Admission:  Wound infection    HPI:   67-year-old male patient with extensive medical history and multiple recent hospital admissions admitted to the hospital for/17/2023 for wound of right BKA.  Kidney transplant with bilateral BKA immunocompromised, recent history of C diff, current Pseudomonas wound infection history of gram-negative bacteremia and strep bacteremia April 2023 and Candida glabrata fungemia this admission      Procedure(s) (LRB):  AMPUTATION, ABOVE KNEE (Right)    Indwelling Lines/Drains at Time of Discharge:   Lines/Drains/Airways     Central Venous Catheter Line  Duration           Percutaneous Central Line Insertion/Assessment - Triple Lumen  04/24/23 1533 Internal Jugular Right <1 day          Drain  Duration                Hemodialysis AV Fistula 09/14/22 0717 Left upper arm 222 days         Urethral Catheter 04/24/23 1400 Latex 16 Fr. <1 day              Hospital Course:   Patient had a cardiac arrest around 1:30 p.m., monitor reviewed, had bradycardia then asystole.  Status post CPR for about 4 minutes.  One epi and 1 bicarb given.  Intubated on the floor and transferred to ICU.  On Levophed drip now.  Sedated with Ativan and fentanyl.      Consults (From admission, onward)        Status Ordering Provider     Inpatient consult to Critical Care Medicine  Once        Provider:  Atilio Bullock MD    Completed JOHANA MEDRANO     Inpatient consult to Infectious Diseases  Once        Provider:  Ronny Veliz MD, FID    Acknowledged SOFIA MATIAS     Inpatient consult to Nephrology  Once        Provider:   Maureen Cherry MD    Acknowledged ANDREIA SOLIS     Inpatient consult to Vascular Surgery  Once        Provider:  Ilan Rivera MD    Completed ANDREIA SOLIS        Significant Labs:  Candida glabrata in blood      Pending Diagnostic Studies:     None        Final Active Diagnoses:    Diagnosis Date Noted POA    PRINCIPAL PROBLEM:  Fungemia [B49] 2023 Unknown    Cardiac arrest [I46.9] 2023 Unknown    Endotracheally intubated [Z97.8] 2023 Unknown    Ulcer of right lower extremity with bone involvement without evidence of necrosis [L97.916] 2023 Yes    Hyponatremia [E87.1] 2023 Yes    Metabolic acidosis [E87.20] 2023 Yes    Renal transplant, status post [Z94.0] 2016 Not Applicable    Chronic obstructive pulmonary disease [J44.9] 2016 Yes    Hyperlipidemia [E78.5] 2016 Yes    Type 2 diabetes mellitus with hyperglycemia, with long-term current use of insulin [E11.65, Z79.4]  Not Applicable    Essential hypertension [I10] 2015 Yes    GERD (gastroesophageal reflux disease) [K21.9] 10/12/2013 Yes      Problems Resolved During this Admission:     No new Assessment & Plan notes have been filed under this hospital service since the last note was generated.  Service: Pulmonology    Discharged Condition:     Disposition:       Patient Instructions:   No discharge procedures on file.  Medications:  None     Atilio Bullock MD  Critical Care Medicine  '[facility] - Intensive Care ([facility])

## 2023-04-24 NOTE — CONSULTS
[facility] - Intensive Care ([facility])  Critical Care Medicine  Consult Note    Patient Name: Lavelle Ladd  MRN: [id number]  Admission Date: 2023  Hospital Length of Stay: 4 days  Code Status: DNR  Attending Physician: Atilio Bullock MD   Primary Care Provider: Primary Doctor No   Principal Problem: Fungemia    [unfilled]  Subjective:     HPI:  67-year-old male patient with extensive medical history and multiple recent hospital admissions admitted to the hospital for/ for wound of right BKA.  Kidney transplant with bilateral BKA immunocompromised, recent history of C diff, current Pseudomonas wound infection history of gram-negative bacteremia and strep bacteremia 2023 and Candida glabrata fungemia this admission      Hospital/ICU Course:  Patient had a cardiac arrest around 1:30 p.m., monitor reviewed, had bradycardia then asystole.  Status post CPR for about 4 minutes.  One epi and 1 bicarb given.  Intubated on the floor and transferred to ICU.  On Levophed drip now.  Sedated with Ativan and fentanyl.      Past Medical History:   Diagnosis Date    DINORAH (acute kidney injury) 2016    Arthritis     CAD in native artery 2019    CHF (congestive heart failure)     Chronic obstructive pulmonary disease 2016    Coronary artery disease involving native coronary artery of native heart without angina pectoris 2016    CRI (chronic renal insufficiency) 2019     donor kidney transplant for DM 16     Induction with Thymo x3 and IV solumedrol to total 875mg  Kidney Biopsy  2016: 16 glomeruli, ACR type 1 AVR type 2, significant microcirculatory changes, c4d negative, No DSA, 5 to10% fibrosis. Treated with thymo x8 2016- no rejection      Diastolic heart failure     Encounter for blood transfusion     ESRD on RRT since 10/2013 10/29/2013    Biopsy proven diabetic nephropathy and lymphoplasmacytic interstitial infiltrate not c/w with AIN (ddx sjogrens or  assoc with tamm-horsefall protein extravasation)     GERD (gastroesophageal reflux disease)     History of hepatitis C, s/p successful Rx w/ SVR12 - 4/2017 4/5/2017    Completed 12 weeks harvoni w/ SVR    Hyperlipidemia     Hypertension     PAD (peripheral artery disease) 7/21/2019    PIC line (peripherally inserted central catheter) flush     Prophylactic immunotherapy     Proteinuria     PVD (peripheral vascular disease) 6/26/2017    RLE BKA CT 12/11/16 Extensive atherosclerotic disease of the aorta and mesenteric arteries.     Renal hypertension     Type 2 diabetes mellitus with diabetic neuropathy, with long-term current use of insulin 12/1/2016    Vitamin B12 deficiency        Past Surgical History:   Procedure Laterality Date    ANGIOGRAPHY OF LOWER EXTREMITY N/A 9/23/2022    Procedure: ANGIOGRAM, LOWER EXTREMITY/left leg;  Surgeon: Donal Mcdonald MD;  Location: [facility] CATH LAB;  Service: Cardiovascular;  Laterality: N/A;    ANGIOGRAPHY OF LOWER EXTREMITY N/A 9/29/2022    Procedure: ANGIOGRAM, LOWER EXTREMITY/left leg;  Surgeon: Donal Mcdonald MD;  Location: [facility] CATH LAB;  Service: Peripheral Vascular;  Laterality: N/A;  resched from 9/23 pt ready now    AORTOGRAPHY WITH SERIALOGRAPHY N/A 6/14/2018    Procedure: LEFT LEG ANGIOGRAM;  Surgeon: Donal Mcdonald MD;  Location: [facility] CATH LAB;  Service: Vascular;  Laterality: N/A;    APPLICATION OF LARGE EXTERNAL FIXATION DEVICE TO TIBIA Left 8/4/2022    Procedure: APPLICATION, EXTERNAL FIXATION DEVICE, LARGE, TIBIA;  Surgeon: Good Villa MD;  Location: [facility] OR;  Service: Orthopedics;  Laterality: Left;    av bovine graft      Left UE    AV FISTULA PLACEMENT      left UE    BELOW KNEE AMPUTATION OF LOWER EXTREMITY Left 10/6/2022    Procedure: AMPUTATION, BELOW KNEE;  Surgeon: Donal Mcdonald MD;  Location: [facility] OR;  Service: Vascular;  Laterality: Left;    CARDIAC CATHETERIZATION  02/2015    CLOSED REDUCTION OF INJURY OF TIBIA Left 8/4/2022     Procedure: CLOSED REDUCTION, TIBIA;  Surgeon: Good Villa MD;  Location: [facility] OR;  Service: Orthopedics;  Laterality: Left;  Closed reduction left tibial and fibular shaft fractures    CLOSURE OF WOUND Left 9/24/2018    Procedure: CLOSURE, WOUND;  Surgeon: Karla Wheeler DPM;  Location: [facility] OR;  Service: Podiatry;  Laterality: Left;  Secondary Wound closure, extensive    CLOSURE OF WOUND Left 11/5/2018    Procedure: CLOSURE, WOUND;  Surgeon: Karla Wheeler DPM;  Location: [facility] OR;  Service: Podiatry;  Laterality: Left;    DEBRIDEMENT OF MULTIPLE METATARSAL BONES Left 11/5/2018    Procedure: DEBRIDEMENT, METATARSAL BONE, 2 OR MORE BONES;  Surgeon: Karla Wheeler DPM;  Location: [facility] OR;  Service: Podiatry;  Laterality: Left;    EXCISION OF SKIN Left 9/27/2019    Procedure: EXCISION, SKIN;  Surgeon: Lenard Alarcon MD;  Location: [address];  Service: Plastics;  Laterality: Left;  Plastics set, NIMS monitor, [employer]Cell    FOOT AMPUTATION THROUGH METATARSAL Left 9/21/2018    Procedure: AMPUTATION, FOOT, TRANSMETATARSAL;  Surgeon: Karla Wheeler DPM;  Location: [facility] OR;  Service: Podiatry;  Laterality: Left;    FOOT AMPUTATION THROUGH METATARSAL Left 10/31/2018    Procedure: AMPUTATION, FOOT, TRANSMETATARSAL;  Surgeon: Karla Wheeler DPM;  Location: [facility] OR;  Service: Podiatry;  Laterality: Left;    FOOT AMPUTATION THROUGH METATARSAL Left 11/5/2018    Procedure: AMPUTATION, FOOT, TRANSMETATARSAL;  Surgeon: Karla Wheeler DPM;  Location: [facility] OR;  Service: Podiatry;  Laterality: Left;  revisional transmetatarsal amputation, Left foot    IRRIGATION AND DEBRIDEMENT OF LOWER EXTREMITY Left 10/31/2018    Procedure: IRRIGATION AND DEBRIDEMENT, LOWER EXTREMITY;  Surgeon: Karla Wheeler DPM;  Location: [facility] OR;  Service: Podiatry;  Laterality: Left;    KIDNEY TRANSPLANT  05/21/2016    LEFT HEART CATHETERIZATION Left 7/21/2019    Procedure: CATHETERIZATION, HEART, LEFT;   Surgeon: Andrew Valdez MD;  Location: [facility] CATH LAB;  Service: Cardiology;  Laterality: Left;    LEG AMPUTATION THROUGH KNEE  2011    right LE, started as nail puncture leading to diabetic ulcer    REMOVAL OF EXTERNAL FIXATION DEVICE Left 9/17/2022    Procedure: REMOVAL, EXTERNAL FIXATION DEVICE;  Surgeon: Kathleen Zazueta MD;  Location: [facility] OR;  Service: Orthopedics;  Laterality: Left;    SKIN FULL THICKNESS GRAFT Left 10/7/2019    Procedure: APPLICATION, GRAFT, SKIN, FULL-THICKNESS;  Surgeon: Lenard Alarcon MD;  Location: [facility] OR [address] [place] [address];  Service: Plastics;  Laterality: Left;    SURGICAL REMOVAL OF LESION OF FACE Right 10/7/2019    Procedure: EXCISION, LESION, FACE;  Surgeon: Lenard Alarcon MD;  [facility];  Service: Plastics;  Laterality: Right;       Review of patient's allergies indicates:  No Known Allergies    Family History       Problem Relation (Age of Onset)    Cancer Father    Diabetes Father    Heart failure Father, Mother    Stroke Father          Tobacco Use    Smoking status: Former     Packs/day: 1.00     Years: 40.00     Pack years: 40.00     Types: Cigarettes     Quit date: 1/11/2013     Years since quitting: 10.2    Smokeless tobacco: Never   Substance and Sexual Activity    Alcohol use: Yes     Alcohol/week: 6.0 standard drinks     Types: 6 Cans of beer per week     Comment: seldom    Drug use: No    Sexual activity: Never         Review of Systems   Unable to perform ROS: Intubated   Objective:     Vital Signs (Most Recent):  Temp: 96.5 °F (35.8 °C) (04/24/23 1050)  Pulse: (!) 136 (04/24/23 1336)  Resp: 18 (04/24/23 1225)  BP: 135/61 (04/24/23 1050)  SpO2: 97 % (04/24/23 1050)   Vital Signs (24h Range):  Temp:  [96.5 °F (35.8 °C)-99.2 °F (37.3 °C)] 96.5 °F (35.8 °C)  Pulse:  [] 136  Resp:  [16-20] 18  SpO2:  [93 %-98 %] 97 %  BP: (100-151)/(53-90) 135/61     Weight: 78.5 kg (173 lb 1 oz)  Body mass index is 24.14 kg/m².      Intake/Output Summary (Last 24  hours) at 4/24/2023 1419  Last data filed at 4/24/2023 0804  Gross per 24 hour   Intake 584.57 ml   Output 550 ml   Net 34.57 ml       Physical Exam  Vitals and nursing note reviewed.   Constitutional:       General: He is in acute distress.      Appearance: He is well-developed. He is ill-appearing and toxic-appearing.   HENT:      Head: Normocephalic and atraumatic.      Nose: Nose normal.      Mouth/Throat:      Comments: ET tube  Eyes:      Extraocular Movements: Extraocular movements intact.   Cardiovascular:      Rate and Rhythm: Regular rhythm. Tachycardia present.   Pulmonary:      Effort: Pulmonary effort is normal.      Breath sounds: No stridor.   Abdominal:      General: There is no distension.   Musculoskeletal:         General: Deformity present. No signs of injury.      Cervical back: Normal range of motion and neck supple.      Comments: Bilateral BKA  Left upper extremity AV fistula    Skin:     General: Skin is dry.   Neurological:      General: No focal deficit present.   Psychiatric:         Behavior: Behavior normal.       Vents:       Lines/Drains/Airways       Drain  Duration                  Hemodialysis AV Fistula 09/14/22 0717 Left upper arm 222 days              Airway  Duration                  Airway - Non-Surgical 04/24/23 1342 Endotracheal Tube <1 day              Peripheral Intravenous Line  Duration                  Peripheral IV - Single Lumen 04/24/23 0342 20 G Posterior;Right Hand <1 day                    Significant Labs:    CBC/Anemia Profile:  Recent Labs   Lab 04/23/23  0521 04/24/23  0620   WBC 13.83* 11.26   HGB 8.5* 9.0*   HCT 28.7* 30.1*    312   MCV 85 83   RDW 16.1* 17.1*        Chemistries:  Recent Labs   Lab 04/23/23  0521 04/24/23  0620   * 134*   K 4.5 4.7   * 108   CO2 14* 16*   BUN 39* 40*   CREATININE 2.5* 2.6*   CALCIUM 8.9 9.3   ALBUMIN  --  2.2*   PHOS  --  3.3      Latest Reference Range & Units Most Recent   BNP 0 - 99 pg/mL 84  4/17/23  21:19   CPK 20 - 200 U/L 46  10/8/22 14:15   CPK MB 0.1 - 6.5 ng/mL 8.9 (H)  7/21/19 02:51   CRP, High Sensitivity 0.00 - 3.19 mg/L 34.18 (H)  7/22/18 16:13    - 260 U/L 157  8/30/22 17:51   MB % 0.0 - 5.0 % 13.5 (H)  7/21/19 02:51   Troponin I 0.000 - 0.026 ng/mL <0.006  4/17/23 21:19   (H): Data is abnormally high  EKG 04/05/2023  Test Reason : R50.9,     Vent. Rate : 117 BPM     Atrial Rate : 117 BPM      P-R Int : 184 ms          QRS Dur : 090 ms       QT Int : 316 ms       P-R-T Axes : 062 -13 068 degrees      QTc Int : 440 ms     Sinus tachycardia   Low voltage QRS   Borderline Abnormal ECG   When compared with ECG of 28-SEP-2022 13:08,   Sinus rhythm is no longer with 2nd degree A-V block (Mobitz I)   Vent. rate has increased BY  63 BPM   Confirmed by MD HARPAL, RAYMOND ([id number]) on 4/8/2023 9:32:44 PM     Echo 2022     The left ventricle is normal in size with moderate concentric hypertrophy and normal systolic function.   The estimated ejection fraction is 60%.   Normal left ventricular diastolic function.   Normal right ventricular size with normal right ventricular systolic function.   Mild tricuspid regurgitation.   Normal central venous pressure (3 mmHg).   The estimated PA systolic pressure is 29 mmHg.       Significant Imaging:   I have reviewed all pertinent imaging results/findings within the past 24 hours.  I have reviewed and interpreted all pertinent imaging results/findings within the past 24 hours.      ABG  No results for input(s): PH, PO2, PCO2, HCO3, BE in the last 168 hours.  Assessment/Plan:     Cardiac/Vascular  Cardiac arrest  Cardiac arrest status post CPR.  Return of circulation in about 4 minutes with 1 epi and 1 bicarb.  Currently DNR.  Will not proceed with targeted temperature management  Resuscitation with fluid vasopressors continue broad-spectrum antibiotic O2/MV/pulmonary toilet    Renal/  Renal transplant, status post  On tacrolimus.  Monitor  level.  P.o. bicarb  Monitor BMP and urine output    ID  * Fungemia  Continue IV micafungin    Other  Endotracheally intubated  O2/MV/pulmonary toilet.  ABG chest x-ray sedated with fentanyl drip and Ativan p.r.n.        Critical Care Daily Checklist:    A: Awake: RASS Goal/Actual Goal:    Actual:     B: Spontaneous Breathing Trial Performed?     C: SAT & SBT Coordinated?  Intubated today                      D: Delirium: CAM-ICU     E: Early Mobility Performed? Yes   F: Feeding Goal: Goals: 1. Pt will continue to tolerate and consume >75% est. Needs by RD follow up 2. Pt will consume Aguilar BID prior to RD follow up 3. Pt diarrhea will improve by RD follow up  Status: Nutrition Goal Status: new   Current Diet Order   Procedures    Diet NPO Except for: Sips with Medication     Order Specific Question:   Except for     Answer:   Sips with Medication      AS: Analgesia/Sedation Fentanyl drip Ativan p.r.n.   T: Thromboembolic Prophylaxis Heparin subcutaneous   H: HOB > 300 Yes   U: Stress Ulcer Prophylaxis (if needed) Famotidine   G: Glucose Control Fingerstick q.6h insulin detemir plus SSI   B: Bowel Function Stool Occurrence: 2   I: Indwelling Catheter (Lines & Woody) Necessity Critically ill place Woody   D: De-escalation of Antimicrobials/Pharmacotherapies IV meropenem IV micafungin    Plan for the day/ETD Cardiac arrest status post CPR endotracheally intubated    Code Status:  Family/Goals of Care: DNR  Spoke to his sister discussed code status will change code status now to DNR     Critical Care Time: 45 minutes  Critical secondary to Patient has a condition that poses threat to life and bodily function:  Cardiac arrest   Critical care was time spent personally by me on the following activities: development of treatment plan with patient or surrogate and bedside caregivers, discussions with consultants, evaluation of patient's response to treatment, examination of patient, ordering and performing treatments and interventions,  ordering and review of laboratory studies, ordering and review of radiographic studies, pulse oximetry, re-evaluation of patient's condition. This critical care time did not overlap with that of any other provider or involve time for any procedures.    Thank you for your consult. I will follow-up with patient. Please contact us if you have any additional questions.     Atilio Bullock MD  Critical Care Medicine  [facility] - Intensive Care ([facility])

## 2023-04-24 NOTE — PROGRESS NOTES
[facility] - [facility] Surg  Nephrology  Progress Note    Patient Name: Lavelle Ladd  MRN: [id number]  Admission Date: 4/17/2023  Hospital Length of Stay: 4 days  Attending Provider: Atilio Bullock MD   Primary Care Physician: Primary Doctor No  Principal Problem:Fungemia  Primary Nephrologist: [facility]     Subjective:     Interval History:   68 yo male with ESRD s/p kidney txp with baseline sCr around 2mg/dL admitted with old R BKA and bone exposure. Has fungemia and pseudomonas wound infection    4/21  - he is considering AKA but would like to make final decision on Monday  - good appetite     4/22  - just awakening form a nap and a little disoriented--receiving  prn morphine, hydrocodone and Benadryl q6dwiyc    4/23  - pt asleep, per chart review has agreed to AKA    4/24  - pt coded asystole>>ROSC and transfer to ICU, intubated  - Central line being placed currently     Review of patient's allergies indicates:  No Known Allergies  Current Facility-Administered Medications   Medication Frequency    0.9%  NaCl infusion PRN    acetaminophen tablet 500 mg Q6H PRN    acetaminophen tablet 500 mg Q8H PRN    albuterol-ipratropium 2.5 mg-0.5 mg/3 mL nebulizer solution 3 mL Q6H PRN    atorvastatin tablet 80 mg Daily    chlorhexidine 0.12 % solution 15 mL BID    cholecalciferol (vitamin D3) 125 mcg (5,000 unit) tablet 5,000 Units Every Mon    dextrose 10% bolus 125 mL 125 mL PRN    dextrose 10% bolus 250 mL 250 mL PRN    dextrose 40 % gel 15,000 mg PRN    dextrose 40 % gel 30,000 mg PRN    diphenhydrAMINE capsule 25 mg Q6H PRN    EPINEPHrine 0.1 mg/mL injection Code/trauma/sedation Med    famotidine (PF) injection 20 mg Daily    fentaNYL 2500 mcg in 0.9% sodium chloride 250 mL infusion premix (titrating) Continuous    ferrous sulfate tablet 1 each BID    gabapentin capsule 300 mg BID    glucagon (human recombinant) injection 1 mg PRN    heparin (porcine) injection 5,000 Units Q8H    HYDROcodone-acetaminophen  mg  per tablet 1 tablet Q8H PRN    insulin aspart U-100 pen 0-5 Units QID (AC + HS) PRN    insulin detemir U-100 (Levemir) pen 12 Units BID    levothyroxine tablet 125 mcg Daily    LORazepam injection 4 mg Q4H PRN    melatonin tablet 6 mg Nightly PRN    meropenem-0.9% sodium chloride 500 mg/50 mL IVPB Q8H    micafungin 100 mg in sodium chloride 0.9 % 100 mL IVPB (MB+) Q24H    miconazole NITRATE 2 % top powder BID    midodrine tablet 2.5 mg Q8H    multivitamin tablet Daily    mupirocin 2 % ointment BID    naloxone injection 0.02 mg PRN    NORepinephrine 4 mg in dextrose 5% 250 mL infusion (premix) (titrating) Continuous    ondansetron disintegrating tablet 4 mg Q8H PRN    sodium bicarbonate 8.4 % (1 mEq/mL) injection Code/trauma/sedation Med    sodium bicarbonate tablet 650 mg TID    sodium chloride 0.9% bolus Code/Trauma/sedation Continuous Med    sodium chloride 0.9% flush 10 mL Q12H PRN    tacrolimus capsule 0.5 mg Daily PM    tacrolimus capsule 1 mg Daily AM       Objective:     Vital Signs (Most Recent):  Temp: 98.8 °F (37.1 °C) (04/24/23 1415)  Pulse: (!) 113 (04/24/23 1515)  Resp: 19 (04/24/23 1515)  BP: (!) 100/50 (04/24/23 1515)  SpO2: 100 % (04/24/23 1515)   Vital Signs (24h Range):  Temp:  [96.5 °F (35.8 °C)-99.2 °F (37.3 °C)] 98.8 °F (37.1 °C)  Pulse:  [0-155] 113  Resp:  [16-20] 19  SpO2:  [92 %-100 %] 100 %  BP: ()/(50-90) 100/50     Weight: 81.1 kg (178 lb 12.7 oz) (04/24/23 1415)  Body mass index is 54.56 kg/m².  Body surface area is 1.66 meters squared.    I/O last 3 completed shifts:  In: 989.7 [P.O.:780; I.V.:55.8; IV Piggyback:153.9]  Out: 1250 [Urine:1250]    Physical Exam  Vitals and nursing note reviewed.       Significant Labs: reviewed         Assessment/Plan:     Active Diagnoses:    Diagnosis Date Noted POA    PRINCIPAL PROBLEM:  Fungemia [B49] 04/19/2023 Unknown    Cardiac arrest [I46.9] 04/24/2023 Unknown    Endotracheally intubated [Z97.8] 04/24/2023 Unknown    Ulcer of right lower  extremity with bone involvement without evidence of necrosis [L97.916] 04/17/2023 Yes    Hyponatremia [E87.1] 04/17/2023 Yes    Metabolic acidosis [E87.20] 04/17/2023 Yes    Renal transplant, status post [Z94.0] 11/30/2016 Not Applicable    Chronic obstructive pulmonary disease [J44.9] 09/12/2016 Yes    Hyperlipidemia [E78.5] 07/01/2016 Yes    Type 2 diabetes mellitus with hyperglycemia, with long-term current use of insulin [E11.65, Z79.4]  Not Applicable    Essential hypertension [I10] 02/20/2015 Yes    GERD (gastroesophageal reflux disease) [K21.9] 10/12/2013 Yes      Problems Resolved During this Admission:     DINORAH on CKD in setting of sepsis   - sCr continues to increase daily, and expect increase again post code  - moderate hydro which was seen on previous txp u/s, pending clinical course for further investigation   - continue supportive care as doing    ESRD s/p kidney txp 5/2016  - Prograf level at goal  - continue Prograf 1/0.5mg and continue to hold MMF due to infection     Metabolic Acidosis  - expect this will worsen post code  - may need bicarb gtts  - monitor     Hyponatremia  - monitor, no indication for treatment     ID  - currently infected old R BKA plus fungemia, was for AKA but s/p code today, AKA will be deferred    Hx of HTN  - relatively hypotensive currently, c/w sepsis   - monitor BPs post code/intubation, crystalloid support and vasopressors as indicated    Anemia, multifactorial  - check H&H post code    Will follow up post code labs     Gianni Masterson MD  Nephrology

## 2023-04-24 NOTE — PROCEDURES
"Lavelle Ladd is a 67 y.o. male patient.    Temp: 96.5 °F (35.8 °C) (04/24/23 1050)  Pulse: 94 (04/24/23 1050)  Resp: 18 (04/24/23 1225)  BP: 135/61 (04/24/23 1050)  SpO2: 97 % (04/24/23 1050)  Weight: 78.5 kg (173 lb 1 oz) (04/21/23 1617)  Height: 5' 11" (180.3 cm) (04/21/23 1655)       Intubation    Date/Time: 4/24/2023 1:57 PM  Location procedure was performed: [facility] MEDICAL SURGICAL UNIT  Performed by: Atilio Bullock MD  Authorized by: Atilio Bullock MD   Assisting provider: Delroy Hanna NP  Pre-operative diagnosis: cardiac arrest  Post-operative diagnosis: same  Consent Done: Emergent Situation  Indications: respiratory distress  Description of findings: copious food particles in oropharynx   Intubation method: direct  Patient status: unconscious  Preoxygenation: BVM  Pretreatment medications: none  Paralytic: none  Laryngoscope size: Mac 4  Tube size: 8.0 mm  Tube type: cuffed  Number of attempts: 2  Ventilation between attempts: BVM  Cricoid pressure: no  Cords visualized: yes  Post-procedure assessment: CO2 detector  Breath sounds: diminished bilaterally  ETT to lip: 25 cm  Tube secured with: adhesive tape, bite block, ETT torres, oral airway, umbilical tape and ties  Chest x-ray interpreted by me.  Patient tolerance: Patient tolerated the procedure well with no immediate complications  Technical procedures used: rapid sequence intubation  Significant surgical tasks conducted by the assistant(s): suman qiu  Complications: No  Estimated blood loss (mL): 0  Specimens: Yes  Implants: Yes    Atilio Bullock M.D       4/24/2023    "

## 2023-04-25 VITALS
BODY MASS INDEX: 35.11 KG/M2 | WEIGHT: 178.81 LBS | OXYGEN SATURATION: 82 % | SYSTOLIC BLOOD PRESSURE: 103 MMHG | DIASTOLIC BLOOD PRESSURE: 51 MMHG | TEMPERATURE: 99 F | HEIGHT: 60 IN

## 2023-04-25 NOTE — PLAN OF CARE
[facility] - Intensive Care (Hospital)  Discharge Final Note    Primary Care Provider: Primary Doctor No    Expected Discharge Date:     Final Discharge Note (most recent)       Final Note - 23 0858          Final Note    Assessment Type Final Discharge Note     Anticipated Discharge Disposition                      Important Message from Medicare

## 2023-04-26 ENCOUNTER — OUTPATIENT CASE MANAGEMENT (OUTPATIENT)
Dept: ADMINISTRATIVE | Facility: OTHER | Age: 68
End: 2023-04-26
Payer: MEDICARE

## 2023-04-26 LAB
BACTERIA BLD CULT: NORMAL
BACTERIA BLD CULT: NORMAL
BACTERIA UR CULT: NO GROWTH
BACTERIA UR CULT: NORMAL

## 2023-04-30 LAB
BACTERIA BLD CULT: ABNORMAL

## 2023-05-11 ENCOUNTER — EXTERNAL HOME HEALTH (OUTPATIENT)
Dept: HOME HEALTH SERVICES | Facility: HOSPITAL | Age: 68
End: 2023-05-11
Payer: MEDICARE

## 2023-06-07 LAB
ACID FAST MOD KINY STN SPEC: NORMAL
MYCOBACTERIUM SPEC QL CULT: NORMAL

## 2023-07-30 NOTE — PT/OT/SLP EVAL
Physical Therapy Evaluation    Patient Name:  Lavelle Ladd   MRN:  [id number]    Recommendations:     Discharge Recommendations:  nursing facility, skilled, LTACH (long term acute care hospital)   Discharge Equipment Recommendations: none   Barriers to discharge: Decreased caregiver support    Assessment:     Lavelle Ladd is a 63 y.o. male admitted with a medical diagnosis of Gas gangrene of foot.  He presents with the following impairments/functional limitations:  weakness, impaired endurance, impaired functional mobilty, decreased safety awareness, decreased coordination.    Rehab Prognosis:  GOOD; patient would benefit from acute skilled PT services to address these deficits and reach maximum level of function.      Recent Surgery: Procedure(s) (LRB):  AMPUTATION, TOE (Left)  AMPUTATION, FOOT, TRANSMETATARSAL (Left)     Plan:     During this hospitalization, patient to be seen 5 x/week to address the above listed problems via gait training, therapeutic activities, therapeutic exercises  · Plan of Care Expires:  09/27/18   Plan of Care Reviewed with: patient    Subjective     Communicated with NURSE HYATT prior to session.  Patient found SUPINE upon PT entry to room, agreeable to evaluation.      Chief Complaint: PAIN  Patient comments/goals:   Pain/Comfort:  · Pain Rating 1: 8/10  · Location - Side 1: Bilateral  · Location 1: (RIB)    Patients cultural, spiritual, Worship conflicts given the current situation:     Living Environment:  PT LIVES WITH 91YO MOTHER WHO IS UNABLE TO ASSIST, 1 STORY HOUSE NO STEPS, PT REPORTS HE AMB WITH PROSTHESIS COMMUNITY DISTANCES WITHOUT AD, STILL DRIVES, INDEP WITH ADL'S  Prior to admission, patients level of function was.  Patient has the following equipment: walker, rolling, wheelchair, cane, straight, power chair, bath bench(PROSTHESIS).  DME owned (not currently used): none.  Upon discharge, patient will have assistance from ?.    Objective:     Patient found with:  telemetry, peripheral IV     General Precautions: Standard, fall   Orthopedic Precautions:N/A   Braces: RLE PROSTHESIS AT HOME     Exams:  · Cognitive Exam:  Patient is oriented to Person, Place, Time and Situation  · Postural Exam:  Patient presented with the following abnormalities:    · -       Rounded shoulders  · -       Forward head  · Sensation:    · -       Impaired  light/touch LLE FROM TOES TO UPPER ANKLE  · RLE ROM: WFL  · RLE Strength: GROSSLY 3/5  · LLE ROM: WFL  · LLE Strength: GROSSLY 3/5    Functional Mobility:  · Bed Mobility:     · Rolling Left:  stand by assistance  · Rolling Right: stand by assistance  · Scooting: stand by assistance  · Supine to Sit: stand by assistance  · Sit to Supine: stand by assistance  · Transfers:     · Sit to Stand:  contact guard assistance with rolling walker  · Gait: PT AMB 3 STEPS FWD AND 3 STEPS BWD USING RW AND CGA, PT DOES NOT WANT ASSIST FROM THERAPIST  · Balance: FAIR-    AM-PAC 6 CLICK MOBILITY  Total Score:18     Therapeutic Activities and Exercises:   PT EDUCATED IN ROLE OF P.T. AND POC, PT EDUCATED IN RW USE AND SAFETY DURING TF'S AND GAIT, PT QUICK MOVING SO IMPAIRED SAFETY, PT REQUIRES MUCH ENCOURAGEMENT TO PARTICIPATE, PT NOT WANTING PHYSICAL ASSISTANCE FROM THERAPIST FOR SAFETY DESPITE EDUCATION.  PT REQUIRED CGA FOR SEATED LATERAL SCOOTING IN BED, PT DECLINED SITTING IN CHAIR SO ASSISTED BACK TO SUPINE    Patient left supine with all lines intact, call button in reach, bed alarm on and NURSE notified.    GOALS:   Multidisciplinary Problems     Physical Therapy Goals        Problem: Physical Therapy Goal    Goal Priority Disciplines Outcome Goal Variances Interventions   Physical Therapy Goal     PT, PT/OT      Description:  LTG'S TO BE MET IN 7 DAYS (9-27-18)  1. PT WILL BE JOE WITH BED MOBILITY  2. PT WILL REQUIRE SBA FOR TF'S  3. PT WILL AMB 45' WITH RW AND SBA  4. PT WILL DEMO F DYNAMIC BALANCE DURING TF'S                    History:     Past  Medical History:   Diagnosis Date    DINORAH (acute kidney injury) 2016    Arthritis     Chronic obstructive pulmonary disease 2016    Coronary artery disease involving native coronary artery of native heart without angina pectoris 2016     donor kidney transplant for DM 16     Induction with Thymo x3 and IV solumedrol to total 875mg  Kidney Biopsy  2016: 16 glomeruli, ACR type 1 AVR type 2, significant microcirculatory changes, c4d negative, No DSA, 5 to10% fibrosis. Treated with thymo x8 2016- no rejection      Diastolic heart failure     Encounter for blood transfusion     ESRD on RRT since 10/2013 10/29/2013    Biopsy proven diabetic nephropathy and lymphoplasmacytic interstitial infiltrate not c/w with AIN (ddx sjogrens or assoc with tamm-horsefall protein extravasation)     GERD (gastroesophageal reflux disease)     History of hepatitis C, s/p successful Rx w/ SVR12 - 2017    Completed 12 weeks harvoni w/ SVR    Hyperlipidemia     Hypertension     PIC line (peripherally inserted central catheter) flush     Prophylactic immunotherapy     Proteinuria     PVD (peripheral vascular disease) 2017    RLE BKA CT 16 Extensive atherosclerotic disease of the aorta and mesenteric arteries.     Renal hypertension     Type 2 diabetes mellitus with diabetic neuropathy, with long-term current use of insulin 2016    Vitamin B12 deficiency        Past Surgical History:   Procedure Laterality Date    AORTOGRAPHY WITH SERIALOGRAPHY N/A 2018    Procedure: LEFT LEG ANGIOGRAM;  Surgeon: Donal Mcdonald MD;  Location: [facility] CATH LAB;  Service: Vascular;  Laterality: N/A;    av bovine graft      Left UE    AV FISTULA PLACEMENT      left UE    BIOPSY Tranplanted Kidney N/A 8/15/2016    Performed by Paulette Hanna MD at [facility] OR 2ND FLR    BIOPSY, LIVER, TRANSJUGULAR APPROACH N/A 2014    Performed by [facility] Diagnostic Provider at [facility] CATH LAB     CARDIAC CATHETERIZATION  02/2015    INSERTION, CATHETER, VASCULAR N/A 10/31/2013    Performed by Ralph Mckeon MD at [facility] CATH LAB    IRRIGATION AND DEBRIDEMENT Left 7/9/2018    Performed by Katerina Magaña DPM at [facility] OR    KIDNEY TRANSPLANT  05/21/2016    LEFT LEG ANGIOGRAM N/A 6/14/2018    Performed by Donal Mcdonald MD at [facility] CATH LAB    LEG AMPUTATION THROUGH KNEE  2011    right LE, started as nail puncture leading to diabetic ulcer    TRANSPLANT-KIDNEY Right 5/21/2016    Performed by Ronny Bergeron MD at [facility] OR [address] [place] [address]       Clinical Decision Making:     History  Co-morbidities and personal factors that may impact the plan of care Examination  Body Structures and Functions, activity limitations and participation restrictions that may impact the plan of care Clinical Presentation   Decision Making/ Complexity Score   Co-morbidities:   [] Time since onset of injury / illness / exacerbation  [] Status of current condition  []Patient's cognitive status and safety concerns    [] Multiple Medical Problems (see med hx)  Personal Factors:   [] Patient's age  [] Prior Level of function   [] Patient's home situation (environment and family support)  [] Patient's level of motivation  [] Expected progression of patient      HISTORY:(criteria)    [] 13172 - no personal factors/history    [] 60720 - has 1-2 personal factor/comorbidity     [] 22903 - has >3 personal factor/comorbidity     Body Regions:  [] Objective examination findings  [] Head     []  Neck  [] Trunk   [] Upper Extremity  [] Lower Extremity    Body Systems:  [] For communication ability, affect, cognition, language, and learning style: the assessment of the ability to make needs known, consciousness, orientation (person, place, and time), expected emotional /behavioral responses, and learning preferences (eg, learning barriers, education  needs)  [] For the neuromuscular system: a general assessment of gross coordinated movement (eg, balance,  gait, locomotion, transfers, and transitions) and motor function  (motor control and motor learning)  [] For the musculoskeletal system: the assessment of gross symmetry, gross range of motion, gross strength, height, and weight  [] For the integumentary system: the assessment of pliability(texture), presence of scar formation, skin color, and skin integrity  [] For cardiovascular/pulmonary system: the assessment of heart rate, respiratory rate, blood pressure, and edema     Activity limitations:    [] Patient's cognitive status and saf ety concerns          [] Status of current condition      [] Weight bearing restriction  [] Cardiopulmunary Restriction    Participation Restrictions:   [] Goals and goal agreement with the patient     [] Rehab potential (prognosis) and probable outcome      Examination of Body System: (criteria)    [] 24366 - addressing 1-2 elements    [] 52180 - addressing a total of 3 or more elements     [] 96523 -  Addressing a total of 4 or more elements         Clinical Presentation: (criteria)  Choose one     On examination of body system using standardized tests and measures patient presents with (CHOOSE ONE) elements from any of the following: body structures and functions, activity limitations, and/or participation restrictions.  Leading to a clinical presentation that is considered (CHOOSE ONE)                              Clinical Decision Making  (Eval Complexity):  Choose One     Time Tracking:     PT Received On: 09/20/18  PT Start Time: 1000     PT Stop Time: 1025  PT Total Time (min): 25 min     Billable Minutes: Evaluation 15 and Therapeutic Activity 10     PT ENCOURAGED TO CALL FOR ASSISTANCE WITH ALL NEEDS DUE TO FALL RISK STATUS, PT AGREEABLE    Kavita Arango, PT  09/20/2018   Patient/Caregiver provided printed discharge information.

## 2023-12-14 NOTE — ASSESSMENT & PLAN NOTE
--vascular and podiatry following  --TMA today (9/21)  --continue daily  ASA     

## 2023-12-22 NOTE — SUBJECTIVE & OBJECTIVE
Follows with pulmonary medicine, continue with current treatment, will contact their office to see if they would like to do a another sleep test.   Interval History: 63 year old man with diabetic foot.  All previous cultures reviewed   10/31- GNR  09/21- MSSA  He does not want LTAC   Review of Systems   Constitutional: Negative for chills, diaphoresis, fatigue and fever.   HENT: Negative for hearing loss, mouth sores, sore throat, tinnitus and trouble swallowing.    Eyes: Negative for pain, discharge and redness.   Respiratory: Negative for apnea, cough, choking, chest tightness, shortness of breath, wheezing and stridor.    Cardiovascular: Negative for chest pain, palpitations and leg swelling.   Gastrointestinal: Negative for abdominal distention, abdominal pain, blood in stool, constipation, diarrhea, nausea, rectal pain and vomiting.   Endocrine: Negative for cold intolerance, heat intolerance, polydipsia, polyphagia and polyuria.   Genitourinary: Negative for difficulty urinating, dysuria, flank pain, frequency, hematuria and urgency.   Musculoskeletal: Negative for arthralgias, back pain, gait problem, joint swelling, neck pain and neck stiffness.   Skin: Positive for color change and wound. Negative for rash.        Left foot with worsening erythema.    Allergic/Immunologic: Negative for food allergies.   Neurological: Negative for dizziness, tremors, seizures, syncope, speech difficulty, light-headedness and headaches.   Hematological: Negative for adenopathy. Does not bruise/bleed easily.   Psychiatric/Behavioral: Negative for agitation and confusion. The patient is not nervous/anxious.    All other systems reviewed and are negative.    Objective:     Vital Signs (Most Recent):  Temp: 97.6 °F (36.4 °C) (11/02/18 0923)  Pulse: 77 (11/02/18 0923)  Resp: 18 (11/02/18 0923)  BP: (!) 111/54 (11/02/18 0923)  SpO2: 97 % (11/02/18 0923) Vital Signs (24h Range):  Temp:  [97.6 °F (36.4 °C)-98.4 °F (36.9 °C)] 97.6 °F (36.4 °C)  Pulse:  [65-77] 77  Resp:  [18-20] 18  SpO2:  [92 %-97 %] 97 %  BP: (111-157)/(54-70) 111/54     Weight: 99.8 kg (220 lb 0.3 oz)  Body  mass index is 30.69 kg/m².    Estimated Creatinine Clearance: 49.2 mL/min (A) (based on SCr of 1.8 mg/dL (H)).    Physical Exam   Constitutional: He is oriented to person, place, and time. He appears well-developed and well-nourished. No distress.   HENT:   Head: Normocephalic and atraumatic.   Mouth/Throat: Oropharynx is clear and moist.   Eyes: Conjunctivae and EOM are normal. Pupils are equal, round, and reactive to light.   Neck: Normal range of motion. Neck supple. No JVD present.   Cardiovascular: Normal rate, regular rhythm, normal heart sounds and intact distal pulses. Exam reveals no gallop and no friction rub.   No murmur heard.  Pulmonary/Chest: Effort normal and breath sounds normal. No stridor. No respiratory distress. He has no wheezes. He has no rales. He exhibits no tenderness.   Abdominal: Soft. Bowel sounds are normal. He exhibits no distension and no mass. There is no tenderness. There is no rebound and no guarding.   Musculoskeletal: Normal range of motion. He exhibits no edema or tenderness.   Neurological: He is alert and oriented to person, place, and time. No cranial nerve deficit.   Skin: Skin is warm and dry. No rash noted. He is not diaphoretic. There is erythema.   LLE dressing CDI.  Pictures in clinic today show + erythema with some sutures still intact.  + malodor per podiatry (see pictures)   Psychiatric: He has a normal mood and affect. His speech is normal. Judgment and thought content normal. He is withdrawn. Cognition and memory are normal. He is inattentive.   Nursing note and vitals reviewed.      Significant Labs:   Blood Culture:   Recent Labs   Lab 09/18/18  1812 09/21/18  0907 09/21/18  1516 10/31/18  1054 10/31/18  1055   LABBLOO Gram stain aer bottle: Gram positive cocci in clusters resembling Staph   Gram stain susan bottle: Gram positive cocci in clusters resembling Staph   Results called to and read back by:NOA Eckert RN 09/19/2018  15:31  STAPHYLOCOCCUS AUREUS  ID  consult required at [facility].[place]arcelina,[place] and [place] locations.  For susceptibility see order # [id number]   No growth after 5 days. No growth after 5 days. No Growth to date  No Growth to date No Growth to date  No Growth to date     BMP:   Recent Labs   Lab 10/31/18  1052  11/02/18  0450   GLU  --    < > 267*   NA  --    < > 133*   K  --    < > 3.8   CL  --    < > 99   CO2  --    < > 21*   BUN  --    < > 26*   CREATININE  --    < > 1.8*   CALCIUM  --    < > 9.0   MG 1.9  --   --     < > = values in this interval not displayed.     CBC:   Recent Labs   Lab 10/31/18  1054 11/01/18  0533 11/02/18  0450   WBC 7.39 11.65 6.86   HGB 13.9* 12.4* 11.5*   HCT 43.2 39.6* 35.9*    182 155     CMP:   Recent Labs   Lab 10/31/18  1053 11/01/18  0533 11/02/18  0450    133* 133*   K 4.8 4.5 3.8    100 99   CO2 23 24 21*   * 193* 267*   BUN 20 24* 26*   CREATININE 1.5* 1.6* 1.8*   CALCIUM 9.5 8.8 9.0   PROT 7.2  --   --    ALBUMIN 3.3*  --   --    BILITOT 0.6  --   --    ALKPHOS 86  --   --    AST 20  --   --    ALT 12  --   --    ANIONGAP 12 9 13   EGFRNONAA 48* 45* 39*     Wound Culture:   Recent Labs   Lab 06/12/18  0912 07/09/18  0856 09/21/18  1235 10/18/18  1328 10/31/18  0911   LABAERO Skin skylar,  no predominant organism CITROBACTER FREUNDII  Rare    KLEBSIELLA OXYTOCA  Rare   STAPHYLOCOCCUS AUREUS  Few  Skin skylar also present   Skin skylar,  no predominant organism GRAM NEGATIVE MIGUEL ANGEL  Many  Identification and susceptibility pending         Significant Imaging: I have reviewed all pertinent imaging results/findings within the past 24 hours.

## 2024-01-08 NOTE — SUBJECTIVE & OBJECTIVE
Interval History: Patient is without complaints. Did not talk much today. Reinforced NPO past midnight on Sunday for wound surgery.     Review of Systems   Constitutional: Negative for chills, diaphoresis, fatigue and fever.   HENT: Negative for hearing loss, mouth sores, sore throat, tinnitus and trouble swallowing.    Eyes: Negative for pain, discharge and redness.   Respiratory: Negative for apnea, cough, choking, chest tightness, shortness of breath, wheezing and stridor.    Cardiovascular: Negative for chest pain, palpitations and leg swelling.   Gastrointestinal: Negative for abdominal distention, abdominal pain, blood in stool, constipation, diarrhea, nausea, rectal pain and vomiting.   Endocrine: Negative for cold intolerance, heat intolerance, polydipsia, polyphagia and polyuria.   Genitourinary: Negative for difficulty urinating, dysuria, flank pain, frequency, hematuria and urgency.   Musculoskeletal: Negative for arthralgias, back pain, gait problem, joint swelling, neck pain and neck stiffness.   Skin: Positive for color change and wound. Negative for rash.   Allergic/Immunologic: Negative for food allergies.   Neurological: Negative for dizziness, tremors, seizures, syncope, speech difficulty, light-headedness and headaches.   Hematological: Negative for adenopathy. Does not bruise/bleed easily.   Psychiatric/Behavioral: Negative for agitation and confusion. The patient is not nervous/anxious.    All other systems reviewed and are negative.    Objective:     Vital Signs (Most Recent):  Temp: 97.6 °F (36.4 °C) (11/02/18 0923)  Pulse: 77 (11/02/18 0923)  Resp: 18 (11/02/18 0923)  BP: (!) 111/54 (11/02/18 0923)  SpO2: 97 % (11/02/18 0923) Vital Signs (24h Range):  Temp:  [97.6 °F (36.4 °C)-98.4 °F (36.9 °C)] 97.6 °F (36.4 °C)  Pulse:  [65-77] 77  Resp:  [18-20] 18  SpO2:  [92 %-97 %] 97 %  BP: (111-157)/(54-70) 111/54     Weight: 99.8 kg (220 lb 0.3 oz)  Body mass index is 30.69 kg/m².    Intake/Output  Summary (Last 24 hours) at 11/2/2018 1224  Last data filed at 11/2/2018 0850  Gross per 24 hour   Intake 810 ml   Output 725 ml   Net 85 ml      Physical Exam   Constitutional: He is oriented to person, place, and time. He appears well-developed and well-nourished. No distress.   HENT:   Head: Normocephalic and atraumatic.   Mouth/Throat: Oropharynx is clear and moist.   Eyes: Conjunctivae and EOM are normal. Pupils are equal, round, and reactive to light.   Neck: Normal range of motion. Neck supple. No JVD present.   Cardiovascular: Normal rate, regular rhythm, normal heart sounds and intact distal pulses. Exam reveals no gallop and no friction rub.   No murmur heard.  Pulmonary/Chest: Effort normal and breath sounds normal. No stridor. No respiratory distress. He has no wheezes. He has no rales. He exhibits no tenderness.   Abdominal: Soft. Bowel sounds are normal. He exhibits no distension and no mass. There is no tenderness. There is no rebound and no guarding.   Musculoskeletal: Normal range of motion. He exhibits no edema or tenderness.   Neurological: He is alert and oriented to person, place, and time. No cranial nerve deficit.   Skin: Skin is warm and dry. No rash noted. He is not diaphoretic. No erythema.   LLE dressing CDI.      Psychiatric: He has a normal mood and affect. His speech is normal. Judgment and thought content normal. He is withdrawn. Cognition and memory are normal. He is inattentive.   Nursing note and vitals reviewed.    Significant Labs:   CBC:   Recent Labs   Lab 11/01/18  0533 11/02/18  0450   WBC 11.65 6.86   HGB 12.4* 11.5*   HCT 39.6* 35.9*    155     CMP:   Recent Labs   Lab 11/01/18  0533 11/02/18  0450   * 133*   K 4.5 3.8    99   CO2 24 21*   * 267*   BUN 24* 26*   CREATININE 1.6* 1.8*   CALCIUM 8.8 9.0   ANIONGAP 9 13   EGFRNONAA 45* 39*     Aerobic culture   Order: [id number]   Status:  Preliminary result   Visible to patient:  No (Not Released)   Next  appt:  None   Specimen Information: Foot, Left; Incision site        Component 2d ago   Aerobic Bacterial Culture ENTEROCOCCUS FAECIUM   Rare   Susceptibility pending  P      Aerobic Bacterial Culture ENTEROBACTER CLOACAE COMPLEX   Rare   Susceptibility pending                   Significant Imaging:   EXAMINATION:  XR FOOT 2 VIEW LEFT    CLINICAL HISTORY:  post op;.  Cutaneous abscess of left foot    TECHNIQUE:  AP, lateral and oblique views of the left foot were performed.    COMPARISON:  10/31/2018 10:59 a.m.    FINDINGS:  Again noted is mid metatarsal amputation of the forefoot.  Interval removal of this skin staples.  Mild soft tissue irregularity distally.  Bones appear generally osteopenic.    No appreciable osseous erosion to suggest osteomyelitis.  If there is clinical concern for osteomyelitis, consider MRI of the left foot with IV contrast.    Atherosclerotic calcifications again noted.  Plantar calcaneal spur.   no

## 2024-05-01 NOTE — ASSESSMENT & PLAN NOTE
HUB TO READ  No answer no VM    There is not anything I can do to assist her with this. I think the thing to do would be to take her daughter to the emergency room.    Hold ASA  Continue Coreg and Lipitor

## 2024-05-22 NOTE — ASSESSMENT & PLAN NOTE
[facility]  Discharge- Stef Pack Sr. 1967, 57 y.o. male MRN: [id number]  Unit/Bed#: [facility] Encounter: [id number]  Primary Care Provider: Rehan Mancia DO   Date and time admitted to hospital: 5/19/2024  5:41 PM    * Hypermagnesemia  Assessment & Plan  Presented with muscle tremors similar to previous episode of hypermagnesemia-increased neuropathic pain without paralysis  Magnesium 5.2 on admission.  Last Admission also had elevated magnesium which dropped to 4 on discharge on 5/16  Magnesium level improved, today magnesium is 3.7.  Discussed with nephrology, cleared the patient to be discharged home with continual of torsemide 40 mg twice daily.  Recheck BMP and magnesium in 3 to 5 days with PCP and follow-up with nephrology outpatient basis.  And also educated to avoid any kind of magnesium containing medication or food at this time.    Chronic back pain  Assessment & Plan  Chronic pain syndrome  Continue PTA Cymbalta, Lyrica, oxycodone  Complaining of constipation - aggressive bowel regimen without magnesium products    Chronic anemia  Assessment & Plan  Hemoglobin stable  CKD induced anemia      Chronic kidney disease  Assessment & Plan  Lab Results   Component Value Date    EGFR 12 05/22/2024    EGFR 11 05/21/2024    EGFR 10 05/20/2024    CREATININE 4.61 (H) 05/22/2024    CREATININE 5.12 (H) 05/21/2024    CREATININE 5.41 (H) 05/20/2024   Elevated creatinine from baseline not meeting KELLY criteria  Baseline creatinine 5.17  Continue sevelamer, allopurinol .    Nephrology consult appreciated, recommending to resume torsemide 40 mg twice daily, recheck BMP in 3 to 5 days and follow-up with nephrology outpatient basis    Diabetes mellitus type 2, insulin dependent (HCC)  Assessment & Plan  Lab Results   Component Value Date    HGBA1C 7.4 (H) 05/20/2024       Recent Labs     05/21/24  1617 05/21/24  2118 05/22/24  0819 05/22/24  1131   POCGLU 200* 219* 141* 184*  Not in exacerbation  -nebulizer treaments   -supplemental oxygen as needed              Blood Sugar Average: Last 72 hrs:  (P) [id number]  Follow hypoglycemia precaution, continue sliding scale.  While patient remain in-house, target blood sugar in between 1 40-1 80.  Can resume home medication after discharge.        Medical Problems       Resolved Problems  Date Reviewed: 5/20/2024            Resolved    Essential hypertension 5/21/2024     Resolved by  Madison Ely MD        Discharging Physician / Practitioner: Madison Ely MD  PCP: Rehan Mancia DO  Admission Date:   Admission Orders (From admission, onward)       Ordered        05/19/24 1911  INPATIENT ADMISSION  Once                          Discharge Date: 05/22/24    Consultations During Hospital Stay:  Nephrology, PT/OT    Procedures Performed:   XR chest portable   ED Interpretation by Galindo Olmos MD (05/19 1910)   No acute finding      Final Result by Paty Gamboa MD (05/19 2052)      Lungs clear.            Workstation performed: [id number]               Significant Findings / Test Results:   Lab Results   Component Value Date    WBC 7.19 05/20/2024    HGB 9.2 (L) 05/20/2024    HCT 27.8 (L) 05/20/2024    MCV 96 05/20/2024     05/20/2024     Lab Results   Component Value Date    SODIUM 137 05/22/2024    K 3.5 05/22/2024     05/22/2024    CO2 27 05/22/2024    AGAP 9 05/22/2024    BUN 60 (H) 05/22/2024    CREATININE 4.61 (H) 05/22/2024    GLUC 165 (H) 05/22/2024    GLUF 89 05/14/2024    CALCIUM 8.6 05/22/2024    AST 10 (L) 05/20/2024    ALT 9 05/20/2024    ALKPHOS 83 05/20/2024    TP 6.2 (L) 05/20/2024    TBILI 0.24 05/20/2024    EGFR 12 05/22/2024     Component  Ref Range & Units 5/22/24  4:37 AM 5/21/24  4:34 AM 5/20/24  5:58 AM 5/19/24  5:57 PM 5/16/24  5:42 AM 5/15/24  5:08 AM 5/14/24  5:17 AM   Magnesium  1.9 - 2.7 mg/dL 3.7 High  4.7 High  5.2 High  5          Incidental Findings:   As mentioned above  I reviewed the above mentioned incidental findings with the patient and/or  family and they expressed understanding.    Test Results Pending at Discharge (will require follow up):   none     Outpatient Tests Requested:  BMP and magnesium level in 5 to 7 days with PCP/nephrology outpatient basis.    Complications:  none    Reason for Admission: High magnesium level    Hospital Course:   Stef Pack Sr. is a 57 y.o. male patient who originally presented to the hospital on 5/19/2024 due to hypermagnesemia secondary to decrease clearance with increased intake.  Patient was taking magnesium and citrate for constipation.  Patient also has CKD .  Patient evaluated by nephrology, patient placed on forced diuresis with IV hydration and IV Lasix with improved magnesium level, magnesium level on discharge date is 3.7.  Nephrology cleared the patient.  Recommendation to continue torsemide 40 mg twice daily, maintain hydration, repeat BMP and magnesium in 3 to 5 days with PCP/nephrology and follow-up with nephrology outpatient basis.    All lab results, imaging findings, treatment plan and option discussed in details with patient.  Verbalized understanding agrees.  Patient also followed by PT OT, recommendation Home with home health.    Please see above list of diagnoses and related plan for additional information.     Condition at Discharge: stable    Discharge Day Visit / Exam:   Subjective: Seen and evaluated and examined.  Resting comfortably but denies any significant complaint.  Vitals: Blood Pressure: 157/82 (05/22/24 0758)  Pulse: 71 (05/22/24 0758)  Temperature: 98.6 °F (37 °C) (05/22/24 0758)  Temp Source: Temporal (05/21/24 0810)  Respirations: 18 (05/22/24 0758)  Height: 6' (182.9 cm) (05/19/24 1946)  Weight - Scale: 78.7 kg (173 lb 6.4 oz) (05/19/24 1946)  SpO2: 97 % (05/22/24 0758)  Exam:   Physical Exam  Vitals and nursing note reviewed.   Constitutional:       Appearance: Normal appearance. He is not ill-appearing or diaphoretic.   Eyes:      General: No scleral icterus.        Left  eye: No discharge.      Extraocular Movements: Extraocular movements intact.      Conjunctiva/sclera: Conjunctivae normal.      Pupils: Pupils are equal, round, and reactive to light.   Cardiovascular:      Rate and Rhythm: Normal rate.      Heart sounds:      No friction rub. No gallop.   Pulmonary:      Effort: Pulmonary effort is normal. No respiratory distress.      Breath sounds: No stridor. No wheezing or rhonchi.   Abdominal:      General: There is no distension.      Tenderness: There is no abdominal tenderness.   Neurological:      Mental Status: He is alert and oriented to person, place, and time.      Cranial Nerves: No cranial nerve deficit.      Sensory: No sensory deficit.      Motor: No weakness.      Coordination: Coordination normal.          Discussion with Family:  with patient.     Discharge instructions/Information to patient and family:   See after visit summary for information provided to patient and family.      Provisions for Follow-Up Care:  See after visit summary for information related to follow-up care and any pertinent home health orders.      Mobility at time of Discharge:   Basic Mobility Inpatient Raw Score: 18  JH-HLM Goal: 6: Walk 10 steps or more  JH-HLM Achieved: 7: Walk 25 feet or more  HLM Goal achieved. Continue to encourage appropriate mobility.     Disposition:   Home with VNA Services (Reminder: Complete face to face encounter)    Planned Readmission: If condition get worse     Discharge Statement:  Greater than 50% of the total time was spent examining patient, answering all patient questions, arranging and discussing plan of care with patient as well as directly providing post-discharge instructions.  Additional time then spent on discharge activities.    Discharge Medications:  See after visit summary for reconciled discharge medications provided to patient and/or family.      **Please Note: This note may have been constructed using a voice recognition system**

## 2024-07-12 NOTE — TELEPHONE ENCOUNTER
----- Message from Jonathan Zaragoza sent at 1/24/2020  2:51 PM CST -----  Contact: self [phone]  Pt would like return call from nurse. Please call back at [phone]. Md Rachel   Ever since she has switched her to the vit d whole milk she has had diarrhea about 4-5 times a day.  She has not noticed any blood in the stool

## 2024-09-12 NOTE — SUBJECTIVE & OBJECTIVE
Interval History: Complained of left arm pain yesterday and that he felt like he was having a heart attack. EKG obtained, troponin negative. Requested to use home diclofenac patch for chronic back pain, which helped. Still feels like pain is uncontrolled. Norco is like taking tick tacs. Does not feel like he can make it home to [place]. Concerned about the cosmetic appearance of the bolster dressing.     Medications:  Continuous Infusions:    Scheduled Meds:   amLODIPine  10 mg Oral Daily    aspirin  81 mg Oral Daily    atorvastatin  80 mg Oral Daily    bacitracin   Topical (Top) Q12H    cephALEXin  500 mg Oral Q6H    clopidogrel  75 mg Oral Daily    ergocalciferol  50,000 Units Oral Q7 Days    gabapentin  300 mg Oral TID    isosorbide mononitrate  30 mg Oral Daily    metoprolol tartrate  25 mg Oral BID    predniSONE  5 mg Oral Daily    sodium bicarbonate  650 mg Oral BID    tacrolimus  0.5 mg Oral BID     PRN Meds:Dextrose 10% Bolus, Dextrose 10% Bolus, glucagon (human recombinant), glucagon (human recombinant), glucose, glucose, glucose, glucose, insulin aspart U-100, metoclopramide HCl, morphine, ondansetron, oxyCODONE-acetaminophen     Review of patient's allergies indicates:  No Known Allergies  Objective:     Vital Signs (24h Range):  Temp:  [96.2 °F (35.7 °C)-98 °F (36.7 °C)] 97.3 °F (36.3 °C)  Pulse:  [68-80] 68  Resp:  [16-20] 16  SpO2:  [92 %-97 %] 97 %  BP: (116-191)/(56-94) 191/94       Lines/Drains/Airways     Peripheral Intravenous Line                 Peripheral IV - Single Lumen 09/27/19 1116 20 G Anterior;Right Forearm 1 day                Physical Exam     NAD, alert, awake  Voice clear   Bolster dressing in place over left temple, clean and dry   Normal external nose, MMM  Normal work of breathing, no stridor/stertor  AKA of right leg     Significant Labs:  Troponin negative.    Significant Diagnostics:  EKG read pending  
Interval History: NAEON. Patient reports he had significant pain yesterday evening after surgery. He is worried about going home with uncontrolled pain as he has chronic pain after multiple back surgeries and leg amputation. Otherwise, he is doing well. Tolerating PO.     Medications:  Continuous Infusions:   sodium chloride 0.9% 50 mL/hr at 09/28/19 0956     Scheduled Meds:   amLODIPine  10 mg Oral Daily    aspirin  81 mg Oral Daily    atorvastatin  80 mg Oral Daily    bacitracin   Topical (Top) Q12H    cephALEXin  500 mg Oral Q6H    clopidogrel  75 mg Oral Daily    ergocalciferol  50,000 Units Oral Q7 Days    gabapentin  300 mg Oral TID    isosorbide mononitrate  30 mg Oral Daily    metoprolol tartrate  25 mg Oral BID    predniSONE  5 mg Oral Daily    sodium bicarbonate  650 mg Oral BID    tacrolimus  0.5 mg Oral BID     PRN Meds:Dextrose 10% Bolus, Dextrose 10% Bolus, glucagon (human recombinant), glucose, glucose, HYDROcodone-acetaminophen, metoclopramide HCl, morphine, ondansetron     Review of patient's allergies indicates:  No Known Allergies  Objective:     Vital Signs (24h Range):  Temp:  [96 °F (35.6 °C)-98.6 °F (37 °C)] 97.6 °F (36.4 °C)  Pulse:  [75-80] 79  Resp:  [7-18] 18  SpO2:  [94 %-99 %] 97 %  BP: (113-162)/(70-95) 136/76     Date 09/28/19 0700 - 09/29/19 0659   Shift 5913-1799 8578-4313 3476-2391 24 Hour Total   INTAKE   Shift Total(mL/kg)       OUTPUT   Urine(mL/kg/hr) 450   450   Shift Total(mL/kg) 450(4.6)   450(4.6)   Weight (kg) 97.1 97.1 97.1 97.1     Lines/Drains/Airways     Peripheral Intravenous Line                 Peripheral IV - Single Lumen 09/27/19 1116 20 G Anterior;Right Forearm less than 1 day                Physical Exam   NAD, alert, awake  Voice clear   Bolster dressing in place over left temple, clean and dry   Normal external nose, MMM  Normal work of breathing, no stridor/stertor  AKA of right leg     Significant Labs:  None    Significant Diagnostics:  None  
Independent in ADLs.

## 2024-10-16 NOTE — MR AVS SNAPSHOT
[facility]  [address]  [facility] [address]  Phone: [phone]                  Lavelle Ladd   2017 1:00 PM   Office Visit    Description:  Male : 1955   Provider:  Tiana Blanco MD   Department:  [facility]           Reason for Visit     Kidney Transplant Evaluation           Diagnoses this Visit        Comments    S/P kidney transplant    -  Primary     Hyperlipidemia, unspecified hyperlipidemia type         Prophylactic immunotherapy         Long-term use of immunosuppressant medication         Kidney transplant rejection         CKD (chronic kidney disease) stage 3, GFR 30-59 ml/min         Cavitary lesion of lung                To Do List           Future Appointments        Provider Department Dept Phone    2017 8:45 AM LABORATORY, [facility] [phone]    3/1/2017 9:00 AM [facility] CT1 LIMIT 500 LBS [facility] - [phone]    3/1/2017 10:00 AM Colin Garcia MD [facility] - Pulmonary Services [phone]    3/7/2017 8:30 AM Dana Morillo RD, CDE [facility] - Diabetes Management [phone]    4/3/2017 9:50 AM LABORATORY, [facility] [phone]      Goals (5 Years of Data)     None       These Medications        Disp Refills Start End    atorvastatin (LIPITOR) 20 MG tablet 30 tablet 11 2017    Take 1 tablet (20 mg total) by mouth once daily. While on Harvoni. - Oral    Pharmacy: [id number] - [place]  [address] [facility] Ph #: [phone]         [facility]     [facility] Care [facility] -  Assistance  Registered nurses in the [facility] provide clinical advisement, health education, appointment booking, and other advisory services.  Call for this free service at [phone].             Medications           Message regarding Medications     Verify the changes and/or additions to your medication regime  "listed below are the same as discussed with your clinician today.  If any of these changes or additions are incorrect, please notify your healthcare provider.        CHANGE how you are taking these medications     Start Taking Instead of    atorvastatin (LIPITOR) 20 MG tablet atorvastatin (LIPITOR) 10 MG tablet    Dosage:  Take 1 tablet (20 mg total) by mouth once daily. While on Harvoni. Dosage:  Take 1 tablet (10 mg total) by mouth once daily. While on Harvoni.    Reason for Change:  Reorder       STOP taking these medications     insulin regular 100 unit/mL Inj injection Take 10 units subq with breakfast and 8 units with dinner    nystatin (MYCOSTATIN) 100,000 unit/mL suspension Take 5 mLs (500,000 Units total) by mouth 3 (three) times daily after meals.    ropinirole (REQUIP) 1 MG tablet Take 1 tablet (1 mg total) by mouth every evening.           Verify that the below list of medications is an accurate representation of the medications you are currently taking.  If none reported, the list may be blank. If incorrect, please contact your healthcare provider. Carry this list with you in case of emergency.           Current Medications     aspirin (ECOTRIN) 81 MG EC tablet Take 1 tablet (81 mg total) by mouth once daily.    atorvastatin (LIPITOR) 20 MG tablet Take 1 tablet (20 mg total) by mouth once daily. While on Harvoni.    atovaquone (MEPRON) 750 mg/5 mL Susp Take 10 mLs (1,500 mg total) by mouth once daily.    BD INSULIN PEN NEEDLE UF SHORT 31 gauge x 5/16" Ndle     blood sugar diagnostic Strp 1 each by Misc.(Non-Drug; Combo Route) route 3 (three) times daily.    blood sugar diagnostic Strp 1 each by Misc.(Non-Drug; Combo Route) route 4 (four) times daily.    ergocalciferol (ERGOCALCIFEROL) 50,000 unit Cap Take 1 capsule (50,000 Units total) by mouth every 7 days. Take on Mondays    famotidine (PEPCID) 20 MG tablet Take 1 tablet (20 mg total) by mouth every evening.    insulin NPH (NOVOLIN N) 100 unit/mL " "injection Take 20 units subq with breakfast and 8 units at bedtime    k phos di & mono-sod phos mono (K-PHOS-NEUTRAL) 250 mg Tab Take 2 tablets by mouth 3 (three) times daily.    lancets Misc 1 each by Misc.(Non-Drug; Combo Route) route 3 (three) times daily.    lancets Misc 1 each by Misc.(Non-Drug; Combo Route) route 4 (four) times daily.    magnesium oxide (MAG-OX) 400 mg tablet Take 1 tablet (400 mg total) by mouth 2 (two) times daily.    metoprolol tartrate (LOPRESSOR) 50 MG tablet Take 0.5 tablets (25 mg total) by mouth 2 (two) times daily.    multivitamin (THERAGRAN) tablet Take 1 tablet by mouth once daily.    mycophenolate (CELLCEPT) 250 mg Cap Take 2 capsules (500 mg total) by mouth 2 (two) times daily. Z94.0 Kidney Transplant 5/21/2016.    olanzapine (ZYPREXA) 5 MG tablet Take 1 tablet (5 mg total) by mouth every evening.    ondansetron (ZOFRAN-ODT) 4 MG TbDL Take 2 tablets (8 mg total) by mouth every 8 (eight) hours as needed (nausea).    oxycodone (ROXICODONE) 15 MG Tab Take 1 tablet (15 mg total) by mouth every 6 (six) hours as needed for Pain.    pen needle, diabetic (EASY COMFORT PEN NEEDLES) 32 gauge x 5/32" Ndle Inject 1 each into the skin 3 (three) times daily.    pen needle, diabetic 31 gauge x 1/4" Ndle 1 each by Misc.(Non-Drug; Combo Route) route 4 (four) times daily.    predniSONE (DELTASONE) 10 MG tablet 20 mg PO QD 12/3-1/2; 15 mg PO QD 1/3-2/2; 10 mg PO QD 2/3-3/2; 5 mg thereafter    sodium bicarbonate 650 MG tablet Take 4 tablets BID  four hours before or after Harvoni    tacrolimus (PROGRAF) 1 MG Cap Take 3mg in the AM and 2mg in the PM by mouth. Z94.0 Kidney Transplant    tramadol (ULTRAM-ER) 100 MG Tb24 Take 100 mg by mouth as needed.    albuterol-ipratropium 2.5mg-0.5mg/3mL (DUO-NEB) 0.5 mg-3 mg(2.5 mg base)/3 mL nebulizer solution Take 3 mLs by nebulization every 6 (six) hours.    blood-glucose meter kit Use as instructed    budesonide-formoterol 160-4.5 mcg (SYMBICORT) " "160-4.5 mcg/actuation HFAA Inhale 2 puffs into the lungs every 12 (twelve) hours. Wash out mouth after using    ERTAPENEM SODIUM (ERTAPENEM, INVANZ, 1 G/100 ML NS, READY TO MIX,) Inject 100 mLs (1 g total) into the vein once daily.    inhalation device (BREATHERITE VALVED MDI CHAMBER) Use as directed for inhalation.    paroxetine (PAXIL) 10 MG tablet Take 1 tablet (10 mg total) by mouth every morning.           Clinical Reference Information           Your Vitals Were     BP Pulse Temp Resp Height Weight    168/105 (BP Location: Right arm, Patient Position: Sitting, BP Method: Automatic) 70 97.8 °F (36.6 °C) (Oral) 18 5' 10" (1.778 m) 91.9 kg (202 lb 9.6 oz)    SpO2 BMI             97% 29.07 kg/m2         Blood Pressure          Most Recent Value    BP  (!)  168/105      Allergies as of 2/22/2017     Tylenol [Acetaminophen]      Immunizations Administered on Date of Encounter - 2/22/2017     None      Maintenance Dialysis History     Start End Type Comments Center    10/30/2013 5/21/2016 Hemo  [facility]            Current Dialysis Center Information     No center information to display.            Transplant Information        Txp Date Organ Coordinator Care Team    5/21/2016 Kidney Rosita Newton RN Referring Physician:  Avel Estrada MD   Current Nephrologist:  Avel Estrada MD   Surgeon:  Ronny Bergeron MD   Assisting Surgeon:  Terrell Navarrete MD   Resident:  Jose David Rowley MD         Instructions    From Dr. Apple:  It is my privilege to participate in your post-transplant care!      Go back to tacrolimus 3 mg in AM and 2 mg in PM.  Get blood cultures today.  We will try to move up CT scan.  Go to ED if breathing worsens.    Please be sure to let us know if you have any questions or concerns about your health care - we cannot help you if we do not know.  Don't forget we are on call 24/7 for any emergencies.   Best Wishes,  Dr. Apple [facility]       Language Assistance Services     " ATTENTION: Language assistance services are available, free of charge. Please call [phone].      ATENCIÓN: Si habla urbanoañol, tiene a hollis disposición servicios gratuitos de asistencia lingüística. Llame al [phone].     CHÚ Ý: N?u b?n nói Ti?ng Vi?t, có các d?ch v? h? tr? ngôn ng? mi?n phí dành cho b?n. G?i s? [phone].         [facility] complies with applicable Federal civil rights laws and does not discriminate on the basis of race, color, national origin, age, disability, or sex.         Duration Of Freeze Thaw-Cycle (Seconds): 0 Post-Care Instructions: I reviewed with the patient in detail post-care instructions. Patient is to wear sunprotection, and avoid picking at any of the treated lesions. Pt may apply Vaseline to crusted or scabbing areas. Render Note In Bullet Format When Appropriate: No Detail Level: Detailed Show Aperture Variable?: Yes Consent: The patient's consent was obtained including but not limited to risks of crusting, scabbing, blistering, scarring, darker or lighter pigmentary change, recurrence, incomplete removal and infection.

## 2024-12-05 NOTE — PROGRESS NOTES
Office Visit       Date: 12/06/2024   Patient Name: Buzz Cox  MRN: [id number]  YOB: 1966    Referring Physician: Beata Lou APRN     Chief Complaint:   Chief Complaint   Patient presents with    Injections       History of Present Illness: Buzz Cox is a 57 y.o. male who is here today for left subacromial bursa steroid injection. He has failed conservative measures. He has not had this area injected within the last 3 months.    He recently completed a course of Levaquin.  He is no longer on antibiotic.    Subjective     Past Medical History:   Past Medical History:   Diagnosis Date    Arthritis     CAD (coronary artery disease)     Cancer     bladder- Dr Gore    Heart attack     Heart attack     High cholesterol     Hyperlipidemia     Hypertension     Myocardial infarction     Pain in joint of right shoulder     tight shoulder pain -Multiple partial tears of tendons-MRI 2018- Dr Monzon       Past Surgical History:   Past Surgical History:   Procedure Laterality Date    BLADDER TUMOR EXCISION      CARDIAC SURGERY      CORONARY ANGIOPLASTY WITH STENT PLACEMENT      ENDOSCOPY  02/06/2024    SHOULDER SURGERY Right 08/06/2019    s/p surgery on right -Dr Rosales. Has had biceps tendonitis on L- S/p injection Dr Rosales with relief       Family History:   Family History   Problem Relation Age of Onset    No Known Problems Mother     Other (carbon dioxide) Father     Other (brain tumor) Sister 33    Arthritis Sister        Social History:   Social History     Socioeconomic History    Marital status:     Number of children: 1   Tobacco Use    Smoking status: Former     Passive exposure: Past    Smokeless tobacco: Never   Vaping Use    Vaping status: Never Used   Substance and Sexual Activity    Alcohol use: No    Drug use: No    Sexual activity: Defer       Medications:   Current Outpatient Medications:     aspirin 81 MG EC tablet, Take 1 tablet by mouth Daily., Disp:  "Subjective:       Patient ID: Lavelle Ladd is a 63 y.o. male.    Chief Complaint: Post-op Evaluation (no c/o pain, wears splint with dressing, diabetic Pt, PCP Dr. Esteban.)      HPI:  Lavelle Ladd presents to the office today, s/p 09/21/2018 transmetatarsal amputation, left foot. Patient does have a contralateral below-knee amputation. Rishabh Esteban MD is his primary care provider.  Patient is a DMII with PVD and peripheral neuropathy.  Patient states persistent slight drainage to the area which she noticed this afternoon upon removal of dressings.  He does present nonweightbearing with a wheelchair and posterior splint.  He states that his right upper extremity PICC line is due to be removed as he has completed therapy and all prior lab work was "clean".  States that his home care agency said that a doctor should pull the PICC line.  Denies systemic signs infection. Final pathology was negative for OM.     Hemoglobin A1C   Date Value Ref Range Status   09/19/2018 8.0 (H) 4.0 - 5.6 % Final     Comment:     ADA Screening Guidelines:  5.7-6.4%  Consistent with prediabetes  >or=6.5%  Consistent with diabetes  High levels of fetal hemoglobin interfere with the HbA1C  assay. Heterozygous hemoglobin variants (HbS, HgC, etc)do  not significantly interfere with this assay.   However, presence of multiple variants may affect accuracy.     07/07/2018 10.2 (H) 4.0 - 5.6 % Final     Comment:     ADA Screening Guidelines:  5.7-6.4%  Consistent with prediabetes  >or=6.5%  Consistent with diabetes  High levels of fetal hemoglobin interfere with the HbA1C  assay. Heterozygous hemoglobin variants (HbS, HgC, etc)do  not significantly interfere with this assay.   However, presence of multiple variants may affect accuracy.     06/12/2018 9.9 (H) 4.0 - 5.6 % Final     Comment:     ADA Screening Guidelines:  5.7-6.4%  Consistent with prediabetes  >or=6.5%  Consistent with diabetes  High levels of fetal hemoglobin interfere with " , Rfl:     atorvastatin (LIPITOR) 20 MG tablet, Take 1 tablet by mouth Daily., Disp: , Rfl:     baclofen (LIORESAL) 10 MG tablet, Take 1 tablet by mouth 3 times a day., Disp: , Rfl:     bisacodyl (Dulcolax) 5 MG EC tablet, Take 2 at 3pm and 2 at 7pm day prior to colonoscopy., Disp: 4 tablet, Rfl: 0    bisacodyl 5 MG EC tablet, Use as directed., Disp: 4 tablet, Rfl: 0    colchicine 0.6 MG tablet, , Disp: , Rfl: 0    diazePAM (VALIUM) 10 MG tablet, , Disp: , Rfl: 0    Diclofenac Sodium (VOLTAREN) 1 % gel gel, APPLY TOPICALLY TO HANDS TWICE DAILY, Disp: , Rfl:     doxycycline (MONODOX) 100 MG capsule, Take 1 capsule by mouth 2 (Two) Times a Day., Disp: 20 capsule, Rfl: 0    famotidine (PEPCID) 40 MG tablet, Take 1 tablet by mouth Every Evening., Disp: 30 tablet, Rfl: 11    finasteride (PROSCAR) 5 MG tablet, , Disp: , Rfl: 0    folic acid (FOLVITE) 1 MG tablet, Take 1 tablet by mouth Daily. Except day of methotrexate day, Disp: , Rfl:     HYDROcodone-acetaminophen (NORCO)  MG per tablet, , Disp: , Rfl: 0    hydroxychloroquine (PLAQUENIL) 200 MG tablet, , Disp: , Rfl:     ibuprofen (ADVIL,MOTRIN) 400 MG tablet, , Disp: , Rfl: 0    lisinopril (PRINIVIL,ZESTRIL) 2.5 MG tablet, , Disp: , Rfl:     lisinopril (PRINIVIL,ZESTRIL) 5 MG tablet, Take 1 tablet by mouth Daily., Disp: , Rfl:     methotrexate 2.5 MG tablet, Take 6 tablets by mouth 1 (One) Time Per Week., Disp: , Rfl:     metoprolol succinate XL (TOPROL-XL) 25 MG 24 hr tablet, Take 1 tablet by mouth Daily., Disp: , Rfl: 0    multivitamin with minerals (ONE DAILY FOR MEN 50+ ADVANCED PO), Take 1 tablet by mouth Daily., Disp: , Rfl:     omeprazole (priLOSEC) 40 MG capsule, Take 1 capsule by mouth Daily., Disp: , Rfl:     polyethylene glycol (GlycoLax) 17 GM/SCOOP powder, Mix 238 g with 64  Oz clear liquid. Starting at 5 PM drinking 8 oz every 10 to 15 min until consumed, Disp: 238 g, Rfl: 0    polyethylene glycol (MIRALAX) 17 GM/SCOOP powder, Mix 238g powder with  the HbA1C  assay. Heterozygous hemoglobin variants (HbS, HgC, etc)do  not significantly interfere with this assay.   However, presence of multiple variants may affect accuracy.           Review of patient's allergies indicates:  No Known Allergies    Past Medical History:   Diagnosis Date    DINORAH (acute kidney injury) 2016    Arthritis     Chronic obstructive pulmonary disease 2016    Coronary artery disease involving native coronary artery of native heart without angina pectoris 2016     donor kidney transplant for DM 16     Induction with Thymo x3 and IV solumedrol to total 875mg  Kidney Biopsy  2016: 16 glomeruli, ACR type 1 AVR type 2, significant microcirculatory changes, c4d negative, No DSA, 5 to10% fibrosis. Treated with thymo x8 2016- no rejection      Diastolic heart failure     Encounter for blood transfusion     ESRD on RRT since 10/2013 10/29/2013    Biopsy proven diabetic nephropathy and lymphoplasmacytic interstitial infiltrate not c/w with AIN (ddx sjogrens or assoc with tamm-horsefall protein extravasation)     GERD (gastroesophageal reflux disease)     History of hepatitis C, s/p successful Rx w/ SVR12 - 2017    Completed 12 weeks harvoni w/ SVR    Hyperlipidemia     Hypertension     PIC line (peripherally inserted central catheter) flush     Prophylactic immunotherapy     Proteinuria     PVD (peripheral vascular disease) 2017    RLE BKA CT 16 Extensive atherosclerotic disease of the aorta and mesenteric arteries.     Renal hypertension     Type 2 diabetes mellitus with diabetic neuropathy, with long-term current use of insulin 2016    Vitamin B12 deficiency        Family History   Problem Relation Age of Onset    Cancer Father     Diabetes Father     Heart failure Father     Stroke Father     Heart failure Mother     Kidney disease Neg Hx        Social History     Socioeconomic History    Marital status:  "64 oz of clear liquid. Use as directed., Disp: 238 g, Rfl: 0    predniSONE (DELTASONE) 5 MG tablet, , Disp: , Rfl: 0    rosuvastatin (CRESTOR) 20 MG tablet, Take 1 tablet by mouth Daily., Disp: , Rfl:     Turmeric 500 MG capsule, Take  by mouth Daily., Disp: , Rfl:   No current facility-administered medications for this visit.    Allergies:   Allergies   Allergen Reactions    Adhesive Tape Unknown - Low Severity       I reviewed the patient's chief complaint, history of present illness, review of systems, past medical history, surgical history, family history, social history, medications and allergy list.     Objective    Vital Signs:   Vitals:    12/06/24 1105   BP: 122/72   BP Location: Left arm   Pulse: 59   Temp: 97.5 °F (36.4 °C)   Weight: 87.3 kg (192 lb 6.4 oz)   Height: 170.2 cm (67\")   PainSc:   9   PainLoc: Shoulder  Comment: left.     Body mass index is 30.13 kg/m².           Physical Exam:  Physical Exam  Constitutional:       Appearance: Normal appearance.   HENT:      Head: Normocephalic and atraumatic.   Eyes:      Conjunctiva/sclera: Conjunctivae normal.      Pupils: Pupils are equal, round, and reactive to light.   Cardiovascular:      Rate and Rhythm: Normal rate.   Pulmonary:      Effort: Pulmonary effort is normal.   Musculoskeletal:         General: Normal range of motion.      Cervical back: Normal range of motion.      Comments: Left shoulder without warmth, erythema, effusion.  Positive subacromial bursa tenderness   Skin:     General: Skin is warm and dry.   Neurological:      General: No focal deficit present.      Mental Status: He is alert and oriented to person, place, and time.   Psychiatric:         Mood and Affect: Mood normal.         Behavior: Behavior normal.         Thought Content: Thought content normal.         Judgment: Judgment normal.            Assessment / Plan    Assessment/Plan:   Diagnoses and all orders for this visit:    1. Chronic pain of both shoulders " Single     Spouse name: Not on file    Number of children: Not on file    Years of education: Not on file    Highest education level: Not on file   Social Needs    Financial resource strain: Not on file    Food insecurity - worry: Not on file    Food insecurity - inability: Not on file    Transportation needs - medical: Not on file    Transportation needs - non-medical: Not on file   Occupational History    Occupation: Disabled     Employer: DISABLED   Tobacco Use    Smoking status: Former Smoker     Packs/day: 1.00     Years: 40.00     Pack years: 40.00     Last attempt to quit: 2013     Years since quittin.7    Smokeless tobacco: Never Used   Substance and Sexual Activity    Alcohol use: Yes     Alcohol/week: 3.6 oz     Types: 6 Cans of beer per week     Comment: 6 beers/week    Drug use: No    Sexual activity: No   Other Topics Concern    Not on file   Social History Narrative    Lives alone. Retired from construction and various jobs, now retired. Would like to return to work PT to alleviate boredom.       Past Surgical History:   Procedure Laterality Date    AMPUTATION, FOOT, TRANSMETATARSAL Left 2018    Performed by Karla Wheeler DPM at [facility] OR    AORTOGRAPHY WITH SERIALOGRAPHY N/A 2018    Procedure: LEFT LEG ANGIOGRAM;  Surgeon: Donal Mcdonald MD;  Location: [facility] CATH LAB;  Service: Vascular;  Laterality: N/A;    av bovine graft      Left UE    AV FISTULA PLACEMENT      left UE    BIOPSY Tranplanted Kidney N/A 8/15/2016    Performed by Paulette Hanna MD at [facility] OR 2ND FLR    BIOPSY, LIVER, TRANSJUGULAR APPROACH N/A 2014    Performed by [facility] Diagnostic Provider at [facility] CATH LAB    CARDIAC CATHETERIZATION  2015    CLOSURE OF WOUND Left 2018    Procedure: CLOSURE, WOUND;  Surgeon: Karla Wheeler DPM;  Location: [facility] OR;  Service: Podiatry;  Laterality: Left;  Secondary Wound closure, extensive    CLOSURE, WOUND Left 2018    Performed by Karla WOODY  (Primary)  Assessment & Plan:  Left subacromial bursateroid injection: 12/6/24    Consent discussed and signed  See procedure documentation and MAR      Other orders  -     methylPREDNISolone acetate (DEPO-medrol) injection 80 mg  -     Arthrocentesis      Arthrocentesis    Date/Time: 12/6/2024 11:18 AM    Performed by: Danyelle Costa APRN  Authorized by: Danyelle Costa APRN  Indications: pain   Body area: shoulder  Joint: left subacromial bursa  Local anesthesia used: yes    Anesthesia:  Local anesthesia used: yes  Local Anesthetic: topical anesthetic (ethyl chloride)    Sedation:  Patient sedated: no    Preparation: Aseptic technique with iodine site preparation.  Needle gauge: 25-gauge.  Ultrasound guidance: no  Meds administered: 80 mg methylPREDNISolone acetate (DEPO-medrol) injection 80 mg/mL  Patient tolerance: patient tolerated the procedure well with no immediate complications          Follow Up:   Return for Next scheduled follow up.        AHSAN Bond  [facility]    LASHONDA Wheeler at [facility] OR    FOOT AMPUTATION THROUGH METATARSAL Left 9/21/2018    Procedure: AMPUTATION, FOOT, TRANSMETATARSAL;  Surgeon: Karla Wheeler DPM;  Location: [facility] OR;  Service: Podiatry;  Laterality: Left;    INSERTION, CATHETER, VASCULAR N/A 10/31/2013    Performed by Ralph Mckeon MD at [facility] CATH LAB    IRRIGATION AND DEBRIDEMENT Left 7/9/2018    Performed by Katerina Magaña DPM at [facility] OR    KIDNEY TRANSPLANT  05/21/2016    LEFT LEG ANGIOGRAM N/A 6/14/2018    Performed by Donal Mcdonald MD at [facility] CATH LAB    LEG AMPUTATION THROUGH KNEE  2011    right LE, started as nail puncture leading to diabetic ulcer    LENGTHENING, TENDON, ACHILLES Left 9/24/2018    Performed by Karla Wheeler DPM at [facility] OR    TRANSPLANT-KIDNEY Right 5/21/2016    Performed by Ronny Bergeron MD at [facility] OR [address] [place] [address]       Review of Systems   Constitutional: Negative for chills, fatigue and fever.   HENT: Negative for hearing loss.    Eyes: Negative for photophobia and visual disturbance.   Respiratory: Negative for cough, chest tightness, shortness of breath and wheezing.    Cardiovascular: Negative for chest pain and palpitations.   Gastrointestinal: Negative for constipation, diarrhea, nausea and vomiting.   Endocrine: Negative for cold intolerance and heat intolerance.   Genitourinary: Negative for flank pain.   Musculoskeletal: Positive for arthralgias, back pain and gait problem. Negative for neck pain and neck stiffness.   Skin: Positive for color change and wound.   Neurological: Positive for numbness. Negative for light-headedness and headaches.   Psychiatric/Behavioral: Negative for sleep disturbance.          Objective:   There were no vitals taken for this visit.     FINAL PATHOLOGIC DIAGNOSIS  1. Left foot 1st metatarsal:  Benign bone.  Negative for osteomyelitis.  2. Left foot 2nd metatarsal:  Benign bone.  Negative for osteomyelitis.  3. Left forefoot:  Gangrenous necrosis.  Negative for  osteomyelitis.  Inflammation extends to the skin and soft tissue resection margin.  OMCBR  Diagnosed by: John Noble M.D.  (Electronically Signed: 2018-09-26 14:07:36)  LOWER EXTREMITY PHYSICAL EXAMINATION  NEUROLOGY: Protective sensation is not intact to the left plantar surfaces of the foot and digits, as the patient has no sensation/detection at greater than 4 distinct points of contact with 5.07 [place] Jean Pierre monofilament. Sensation to light touch is intact on the right foot. Proprioception is intact. Sensation to pin prick is reduced to absent. Vibratory sensation is diminished.    VASCULAR: On the left foot, the dorsalis pedis pulse is 0/4 and the posterior tibial pulse is 0/4. Capillary refill time is slightly greater than 3 seconds. Hair growth is absent on the dorsum of the foot . No rubor is present. Proximal to distal temperature is warm to warm.    DERMATOLOGY:  Sutures remain intact the left transmetatarsal amputation stump.  The immediate lateral and distal aspect incision site appears to be coapting well. The center portion of the incision is with moderate maceration, without fluctuance or purulence upon palpation compression. There is no malodor.  These areas do need slightly more time to collapse.  There is substantial maceration within the area.  No probe to bone or osseous exposure is noted. Granulation tissues with slight fibrosis, deep within the incision site in this area.    ORTHOPEDIC:  Left lower extremity transmetatarsal amputation.  Right lower extremity below-knee amputation.    Physical Exam   Musculoskeletal:        Right elbow: Decreased range of motion: RUE PICC line insertion site prior and  post removal, is without infective signs.       Assessment:     1. History of transmetatarsal amputation of left foot    2. Acute osteomyelitis of toe of left foot    3. Diabetic polyneuropathy associated with type 2 diabetes mellitus    4. Type 2 diabetes mellitus with diabetic peripheral  angiopathy without gangrene, with long-term current use of insulin          Plan:     History of transmetatarsal amputation of left foot    Acute osteomyelitis of toe of left foot    Diabetic polyneuropathy associated with type 2 diabetes mellitus    Type 2 diabetes mellitus with diabetic peripheral angiopathy without gangrene, with long-term current use of insulin        Thorough discussion is had with the patient today, concerning the diagnosis, its etiology, and the treatment algorithm at present. RUE PICC Line is removed followed by compression dressings.  Concerning left lower extremity, the lateral and medial most aspect of the incision are coapted well. The center portion of the incision need slightly more time to coapted due to maceration due to drainage.  Upon clinical evaluation, no overt cellulitis, calor erythema is noted to this portion of the incision site.  No deep abscess formation or sinus tracts are appreciated.  Gentian violet application to all areas of maceration.  Wet to dry dressings are applied thereafter.  Patient will continue daily wet to dry dressing to evaporate the moisture.  Continue nonweightbearing in a posterior splint with wheelchair.  Patient will see Dr. Wheeler on late next week. Patient may need removal of sutures, with subsequent in office debridement with collagen based wound care dressings or Aquacel or possible initiation of WoundVac therapy.         Future Appointments   Date Time Provider Department Center   10/18/2018 11:00 AM Karla Wheeler DPM [facility] POD [place]

## 2025-01-21 NOTE — PROGRESS NOTES
Pharmacist Renal Dose Adjustment Note    Lavelle Ladd is a 67 y.o. male being treated with the medication famotidine.     Patient Data:    Vital Signs (Most Recent):  Temp: 98.8 °F (37.1 °C) (04/24/23 1415)  Pulse: (!) 155 (04/24/23 1340)  Resp: 18 (04/24/23 1225)  BP: 107/61 (04/24/23 1340)  SpO2: 97 % (04/24/23 1050)   Vital Signs (72h Range):  Temp:  [96.5 °F (35.8 °C)-99.2 °F (37.3 °C)]   Pulse:  [0-155]   Resp:  [16-20]   BP: ()/(52-96)   SpO2:  [90 %-98 %]      Recent Labs   Lab 04/22/23  0937 04/23/23  0521 04/24/23  0620   CREATININE 2.4* 2.5* 2.6*     Serum creatinine: 2.6 mg/dL (H) 04/24/23 0620  Estimated creatinine clearance: 20.1 mL/min (A)    Per protocol for CrCl < 50 ml/min, dose will be reduced from 20 mg IV twice daily to 20 mg IV once daily.     Pharmacist's Name: Katherine E Mcardle  Pharmacist's Extension: [phone]   Cw home lasix 20 mg daily

## 2025-03-26 NOTE — ASSESSMENT & PLAN NOTE
Hold cellcept due to active infection  Cont tacrolimus  Check tac level    9/14/22: Consult nephrology   Slight bump in creatinine to 1.7. Lasix dose held. Monitoring   Spoke With Pt Schedule her per  Virtual Visit To discuss her labs

## (undated) DEVICE — SEE MEDLINE ITEM 152622

## (undated) DEVICE — SUPPORT ULNA NERVE PROTECTOR

## (undated) DEVICE — SUT VICRYL BR 1 GEN 27 CT-1

## (undated) DEVICE — SUT VICRYL 3-0 27 SH

## (undated) DEVICE — BLADE SCALP OPHTL RND TIP

## (undated) DEVICE — ELECTRODE REM PLYHSV RETURN 9

## (undated) DEVICE — DRAPE U SPLIT SHEET 54X76IN

## (undated) DEVICE — GLOVE 7.0 PROTEXIS PI BLUE

## (undated) DEVICE — NDL HYPO REG 25G X 1 1/2

## (undated) DEVICE — SUT 4-0 ETHILON 18 PS-2

## (undated) DEVICE — SUT STRATAFIX SPRL PS-2 3-0

## (undated) DEVICE — KIT INTRODUCER STIFFEN MICRO

## (undated) DEVICE — SEE MEDLINE ITEM 152529

## (undated) DEVICE — DRESSING XEROFORM STRL 4X3

## (undated) DEVICE — BANDAGE ESMARK ELASTIC ST 6X9

## (undated) DEVICE — STOCKINET TUBULAR 1 PLY 6X60IN

## (undated) DEVICE — DRESSING XEROFORM FOIL PK 1X8

## (undated) DEVICE — DRESSING N ADH OIL EMUL 3X8

## (undated) DEVICE — CONTAINER SPECIMEN STRL 4OZ

## (undated) DEVICE — MANIFOLD 4 PORT

## (undated) DEVICE — BANDAGE MATRIX HK LOOP 4IN 5YD

## (undated) DEVICE — BLADE SYSTEM 6 25MMX90MMX1.27M

## (undated) DEVICE — GUIDEWIRE STD .035X260CM ANG

## (undated) DEVICE — SPONGE LAP 18X18 PREWASHED

## (undated) DEVICE — BLADE SURG #15 CARBON STEEL

## (undated) DEVICE — SEE MEDLINE ITEM 146308

## (undated) DEVICE — GLOVE PROTEXIS HYDROGEL SZ7

## (undated) DEVICE — SYR ONLY LEUR TIP 30CC

## (undated) DEVICE — TIP SUCTION YANKAUER

## (undated) DEVICE — PAD CAST SPECIALIST STRL 4

## (undated) DEVICE — GOWN SURGICAL X-LARGE

## (undated) DEVICE — HEMOSTAT SURGICEL 4X8IN

## (undated) DEVICE — SUT SILK 2-0 STRANDS 30IN

## (undated) DEVICE — GOWN POLY REINF BRTH SLV XL

## (undated) DEVICE — NDL HYPODERMIC BLUNT 18G 1.5IN

## (undated) DEVICE — BLADE AVG MEDIUM 25 X 9

## (undated) DEVICE — DRAPE PLASTIC U 60X72

## (undated) DEVICE — PAD ABD 8X10 STERILE

## (undated) DEVICE — COVER OVERHEAD SURG LT BLUE

## (undated) DEVICE — BLADE SAW 16.0MM X 7.0MM

## (undated) DEVICE — BANDAGE ACE DOUBLE STER 6IN

## (undated) DEVICE — TOURNIQUET SB QC DP 34X4IN

## (undated) DEVICE — COVER LIGHT HANDLE 80/CA

## (undated) DEVICE — SOL 9P NACL IRR PIC IL

## (undated) DEVICE — DRAPE ORTH SPLIT 77X108IN

## (undated) DEVICE — CORD BIPOLAR 12 FOOT

## (undated) DEVICE — SEE MEDLINE ITEM 146298

## (undated) DEVICE — PACKING STRIP IDOFORM 1X5YD

## (undated) DEVICE — SKINMARKER & RULER REGULAR X-F

## (undated) DEVICE — POSITIONER HEAD DONUT 9IN FOAM

## (undated) DEVICE — GLOVE PROTEXIS LTX MICRO 6.5

## (undated) DEVICE — SEE MEDLINE ITEM 157128

## (undated) DEVICE — TRAY MINOR GEN SURG

## (undated) DEVICE — SEE MEDLINE ITEM 157194

## (undated) DEVICE — DRAPE STERI U-SHAPED 47X51IN

## (undated) DEVICE — SUT ETHILON 3-0 PS2 18 BLK

## (undated) DEVICE — TOURNIQUET SB QC DP 18X4IN

## (undated) DEVICE — SHEET EENT SPLIT

## (undated) DEVICE — UNDERGLOVES BIOGEL PI SIZE 7.5

## (undated) DEVICE — LINER GLOVE POWDERFREE SZ 6.5

## (undated) DEVICE — PACK BASIC SETUP SC BR

## (undated) DEVICE — SUT 4-0 VICRYL / SH

## (undated) DEVICE — SEE MEDLINE ITEM 154981

## (undated) DEVICE — SYR 30CC LUER LOCK

## (undated) DEVICE — SEE MEDLINE ITEM 152522

## (undated) DEVICE — SEE MEDLINE ITEM 157027

## (undated) DEVICE — ELECTRODE BLADE INSULATED 1 IN

## (undated) DEVICE — DRESSING N ADH OIL EMUL 3X3

## (undated) DEVICE — SUT VICRYL PLUS 0 CT1 36IN

## (undated) DEVICE — DECANTER VIAL ASEPTIC TRANSFER

## (undated) DEVICE — DRAPE THREE-QTR REINF 53X77IN

## (undated) DEVICE — Device

## (undated) DEVICE — KIT PT CARE HANA PROFX SSXT

## (undated) DEVICE — SUT 1 18IN COATED VICRYL U

## (undated) DEVICE — SPONGE DERMACEA GAUZE 4X4

## (undated) DEVICE — SHEET DRAPE FAN-FOLDED 3/4

## (undated) DEVICE — BLADE LONG 31.0MM X 9.0MM

## (undated) DEVICE — SUT VICRYL 2-0 CT-1 VCP345H

## (undated) DEVICE — SHEATH INTRODUCER 5FR 10CM

## (undated) DEVICE — SUT VICRYL 4-0 ANTIBACT

## (undated) DEVICE — NDL SAFETY 25G X 1.5 ECLIPSE

## (undated) DEVICE — SYR IRRIGATION BULB STER 60ML

## (undated) DEVICE — DRAPE INCISE IOBAN 2 23X33IN

## (undated) DEVICE — STAPLER SKIN PROXIMATE WIDE

## (undated) DEVICE — SEE MEDLINE ITEM 157216

## (undated) DEVICE — SEE MEDLINE ITEM 157117

## (undated) DEVICE — TOWEL OR DISP STRL BLUE 4/PK

## (undated) DEVICE — SUT ETHILON 2-0 BLK PS-2

## (undated) DEVICE — PACK CATH LAB CUSTOM BR

## (undated) DEVICE — SUT VICRYL PLUS 3-0 SH 18IN

## (undated) DEVICE — COVER CAMERA OPERATING ROOM

## (undated) DEVICE — SUT MCRYL PLUS 4-0 PS2 27IN

## (undated) DEVICE — SOL NS 1000CC

## (undated) DEVICE — CONTRAST VISIPAQUE 150ML

## (undated) DEVICE — APPLICATOR CHLORAPREP ORN 26ML

## (undated) DEVICE — APPLIER CLIP LIAGCLIP 9.375IN

## (undated) DEVICE — SEE MEDLINE ITEM 157171

## (undated) DEVICE — SEE MEDLINE ITEM 152523

## (undated) DEVICE — KIT IRR SUCTION HND PIECE

## (undated) DEVICE — CATH ANGIO SOFT VU 5FR 65CM

## (undated) DEVICE — SEE MEDLINE ITEM 146231

## (undated) DEVICE — BANDAGE MATRIX HK LOOP 6IN 5YD

## (undated) DEVICE — IMMOB KNEE UNIV TRI PANEL 19IN

## (undated) DEVICE — DRAPE SURG W/TWL 17 5/8X23

## (undated) DEVICE — SUT VICRYL PLUS 3-0 PS2 18

## (undated) DEVICE — DRAPE T EXTRM SURG 121X128X90

## (undated) DEVICE — SUT VICRYL 2-0 36 CT-1

## (undated) DEVICE — CLIP MED TICALL

## (undated) DEVICE — CLIPPER BLADE MOD 4406 (CAREF)

## (undated) DEVICE — GLOVE BIOGEL PI MICRO SZ 7.5

## (undated) DEVICE — DRAPE C-ARMOR EQUIPMENT COVER

## (undated) DEVICE — GAUZE SPONGE 4X4 12PLY

## (undated) DEVICE — PROBE SIMULATOR KRAFF

## (undated) DEVICE — COUPLING HOFF3 RR 5X8X11MM

## (undated) DEVICE — GLOVE SURGICAL LATEX SZ 7

## (undated) DEVICE — SUT 3-0 12-18IN SILK

## (undated) DEVICE — SUT SILK 3-0 STRANDS 30IN

## (undated) DEVICE — TAPE SILK 3IN

## (undated) DEVICE — UNDERGLOVES BIOGEL PI SIZE 8

## (undated) DEVICE — BANDAGE DERMACEA STRETCH 4X1IN

## (undated) DEVICE — ADHESIVE DERMABOND ADVANCED

## (undated) DEVICE — ALCOHOL ISOPROPYL 4OZ

## (undated) DEVICE — SYR 3CC LUER LOC

## (undated) DEVICE — SUT ETHILON 4-0 PS2 18 BLK

## (undated) DEVICE — SOL NACL 0.9% INJ 500ML BG

## (undated) DEVICE — ELECTRODE NDL

## (undated) DEVICE — GLOVE SURG BIOGEL LATEX SZ 7.5

## (undated) DEVICE — SUT 1 36IN COATED VICRYL UN

## (undated) DEVICE — SCRUB DYNA-HEX LIQ 4% CHG 4OZ

## (undated) DEVICE — GAUZE SPONGE PEANUT STRL

## (undated) DEVICE — CATH ANGLED GLIDE CATH 4FR

## (undated) DEVICE — DRAPE STERI INSTRUMENT 1018

## (undated) DEVICE — SEE MEDLINE ITEM 157131

## (undated) DEVICE — SYR 10CC LUER LOCK

## (undated) DEVICE — BANDAGE CONFORM 3IN STRL

## (undated) DEVICE — SOL IRR NACL .9% 3000ML

## (undated) DEVICE — SUT VICRYL 1 OB 36 CTX

## (undated) DEVICE — SEE MEDLINE ITEM 152739

## (undated) DEVICE — GLOVE BIOGEL PI ORTHO PRO 7.5

## (undated) DEVICE — TAPE UMBILICAL 1/8X36IN WHITE

## (undated) DEVICE — ALCOHOL 70% ISOP RUBBING 4OZ

## (undated) DEVICE — SUT LIGACLIP SMALL XTRA

## (undated) DEVICE — GLOVE PROTEXIS HYDROGEL SZ6.5

## (undated) DEVICE — DRAPE MOBILE C-ARM

## (undated) DEVICE — TAPE SURGICAL MICROFOAM 3IN

## (undated) DEVICE — SUT 2/0 30IN SILK BLK BRAI

## (undated) DEVICE — HEADREST ROUND DISP FOAM 9IN

## (undated) DEVICE — GUIDEWIRE STD .035X180CM ANG